# Patient Record
Sex: MALE | Race: BLACK OR AFRICAN AMERICAN | NOT HISPANIC OR LATINO | Employment: FULL TIME | URBAN - METROPOLITAN AREA
[De-identification: names, ages, dates, MRNs, and addresses within clinical notes are randomized per-mention and may not be internally consistent; named-entity substitution may affect disease eponyms.]

---

## 2021-07-14 ENCOUNTER — OFFICE VISIT (OUTPATIENT)
Dept: GASTROENTEROLOGY | Facility: CLINIC | Age: 26
End: 2021-07-14
Payer: COMMERCIAL

## 2021-07-14 ENCOUNTER — APPOINTMENT (OUTPATIENT)
Dept: LAB | Facility: CLINIC | Age: 26
End: 2021-07-14
Payer: COMMERCIAL

## 2021-07-14 VITALS
BODY MASS INDEX: 24.55 KG/M2 | DIASTOLIC BLOOD PRESSURE: 70 MMHG | SYSTOLIC BLOOD PRESSURE: 118 MMHG | HEART RATE: 65 BPM | HEIGHT: 67 IN | WEIGHT: 156.4 LBS

## 2021-07-14 DIAGNOSIS — R10.13 EPIGASTRIC PAIN: ICD-10-CM

## 2021-07-14 DIAGNOSIS — R53.81 DEBILITY: ICD-10-CM

## 2021-07-14 DIAGNOSIS — R19.4 CHANGE IN BOWEL HABITS: Primary | ICD-10-CM

## 2021-07-14 DIAGNOSIS — R63.4 UNINTENTIONAL WEIGHT LOSS: ICD-10-CM

## 2021-07-14 DIAGNOSIS — R11.0 NAUSEA: ICD-10-CM

## 2021-07-14 DIAGNOSIS — K59.00 ACUTE CONSTIPATION: ICD-10-CM

## 2021-07-14 DIAGNOSIS — K92.1 MELENA: ICD-10-CM

## 2021-07-14 LAB
BASOPHILS # BLD AUTO: 0.07 THOUSANDS/ΜL (ref 0–0.1)
BASOPHILS NFR BLD AUTO: 1 % (ref 0–1)
EOSINOPHIL # BLD AUTO: 0.5 THOUSAND/ΜL (ref 0–0.61)
EOSINOPHIL NFR BLD AUTO: 10 % (ref 0–6)
ERYTHROCYTE [DISTWIDTH] IN BLOOD BY AUTOMATED COUNT: 13.3 % (ref 11.6–15.1)
HCT VFR BLD AUTO: 42.2 % (ref 36.5–49.3)
HGB BLD-MCNC: 12.5 G/DL (ref 12–17)
IMM GRANULOCYTES # BLD AUTO: 0.01 THOUSAND/UL (ref 0–0.2)
IMM GRANULOCYTES NFR BLD AUTO: 0 % (ref 0–2)
LYMPHOCYTES # BLD AUTO: 1.6 THOUSANDS/ΜL (ref 0.6–4.47)
LYMPHOCYTES NFR BLD AUTO: 33 % (ref 14–44)
MCH RBC QN AUTO: 24.3 PG (ref 26.8–34.3)
MCHC RBC AUTO-ENTMCNC: 29.6 G/DL (ref 31.4–37.4)
MCV RBC AUTO: 82 FL (ref 82–98)
MONOCYTES # BLD AUTO: 0.56 THOUSAND/ΜL (ref 0.17–1.22)
MONOCYTES NFR BLD AUTO: 12 % (ref 4–12)
NEUTROPHILS # BLD AUTO: 2.12 THOUSANDS/ΜL (ref 1.85–7.62)
NEUTS SEG NFR BLD AUTO: 44 % (ref 43–75)
NRBC BLD AUTO-RTO: 0 /100 WBCS
PLATELET # BLD AUTO: 240 THOUSANDS/UL (ref 149–390)
PMV BLD AUTO: 11.1 FL (ref 8.9–12.7)
RBC # BLD AUTO: 5.14 MILLION/UL (ref 3.88–5.62)
WBC # BLD AUTO: 4.86 THOUSAND/UL (ref 4.31–10.16)

## 2021-07-14 PROCEDURE — 85025 COMPLETE CBC W/AUTO DIFF WBC: CPT

## 2021-07-14 PROCEDURE — 36415 COLL VENOUS BLD VENIPUNCTURE: CPT

## 2021-07-14 PROCEDURE — 99203 OFFICE O/P NEW LOW 30 MIN: CPT | Performed by: PHYSICIAN ASSISTANT

## 2021-07-14 PROCEDURE — 84443 ASSAY THYROID STIM HORMONE: CPT

## 2021-07-14 PROCEDURE — 80053 COMPREHEN METABOLIC PANEL: CPT

## 2021-07-14 PROCEDURE — 82652 VIT D 1 25-DIHYDROXY: CPT

## 2021-07-14 RX ORDER — COVID-19 ANTIGEN TEST
KIT MISCELLANEOUS
COMMUNITY
End: 2021-07-14

## 2021-07-14 RX ORDER — PANTOPRAZOLE SODIUM 40 MG/1
40 TABLET, DELAYED RELEASE ORAL DAILY
Qty: 30 TABLET | Refills: 2 | Status: SHIPPED | OUTPATIENT
Start: 2021-07-14 | End: 2021-08-09

## 2021-07-14 NOTE — H&P (VIEW-ONLY)
[facility] - Outpatient Consultation  [facility], THE 26 y o  male MRN: [id number]  Encounter: [id number]          ASSESSMENT AND PLAN:      1  Melena  2  Epigastric Pain  3  Nausea  - He reports postprandial epigastric pressure x 2 months associated with nausea, regurgitation, and several episodes of black colored stool  - Start protonix 40 mg daily  - Schedule EGD for further evaluation  - He denies NSAID use      4  Change in bowel habits  5  Acute constipation  6  Unintentional weight loss  - He reports a significant change in bowel habits with constipation for up to a week at a time with a weight loss of 10 lbs unintentionally without changes in medication or activity level  - Schedule colonoscopy for further evaluation given his significant change in bowel habits and weight loss  - Start a fiber supplement or miralax daily  - He denies any family history of IBD, colon cancer, or any personal surgical history        ______________________________________________________________________    HPI:  Rodrigo Sinha is a 27 yo M with no significant PMH, presenting for evaluation of new onset constipation over the past 2 months as well as postprandial upper abdominal pressure with nausea at times  He reports that prior to 2 months ago he would have normal formed daily bowel movements without any abdominal pain  He reports that nothing change in his life such as his activity level or medications and now he will not have a bowel movement for about a week at a time  He reports that he runs frequently time and eats a high-fiber diet  He tried taking miralax which didn't help and he took metamucil which then caused diarrhea  He has seen several episodes of black appearing stool, no hematochezia or BRBPR  He denies any abdominal surgeries  He is getting postprandial epigastric pressure with nausea at times and regurgitation  He denies any vomiting    He has lost about 10 lb in the past 2 months unintentionally  He has never had an endoscopy or colonoscopy in the past   He denies NSAID use  He denies any family history of colon cancer or inflammatory bowel disease      REVIEW OF SYSTEMS:    CONSTITUTIONAL: Denies any fever, chills, rigors, and weight loss  HEENT: No earache or tinnitus  Denies hearing loss or visual disturbances  CARDIOVASCULAR: No chest pain or palpitations  RESPIRATORY: Denies any cough, hemoptysis, shortness of breath or dyspnea on exertion  GASTROINTESTINAL: As noted in the History of Present Illness  GENITOURINARY: No problems with urination  Denies any hematuria or dysuria  NEUROLOGIC: No dizziness or vertigo, denies headaches  MUSCULOSKELETAL: Denies any muscle or joint pain  SKIN: Denies skin rashes or itching  ENDOCRINE: Denies excessive thirst  Denies intolerance to heat or cold  PSYCHOSOCIAL: Denies depression or anxiety  Denies any recent memory loss  Historical Information   History reviewed  No pertinent past medical history  History reviewed  No pertinent surgical history  Social History   Social History     Substance and Sexual Activity   Alcohol Use Yes    Comment: ocassstephany     Social History     Substance and Sexual Activity   Drug Use Not on file     Social History     Tobacco Use   Smoking Status Never Smoker   Smokeless Tobacco Never Used     History reviewed  No pertinent family history  Meds/Allergies       Current Outpatient Medications:     pantoprazole (PROTONIX) 40 mg tablet    Allergies   Allergen Reactions    Pollen Extract Sneezing           Objective     Blood pressure 118/70, pulse 65, height 5' 7" (1 702 m), weight 70 9 kg (156 lb 6 4 oz)  Body mass index is 24 5 kg/m²          PHYSICAL EXAM:      General Appearance:   Alert, cooperative, no distress   HEENT:   Normocephalic, atraumatic, anicteric      Neck:  Supple, symmetrical, trachea midline   Lungs:   Clear to auscultation bilaterally; no rales, rhonchi or wheezing; respirations unlabored    Heart[de-identified]   Regular rate and rhythm; no murmur, rub, or gallop  Abdomen:   Soft, non-tender, non-distended; normal bowel sounds; no masses, no organomegaly    Genitalia:   Deferred    Rectal:   Deferred    Extremities:  No cyanosis, clubbing or edema    Pulses:  2+ and symmetric    Skin:  No jaundice, rashes, or lesions    Lymph nodes:  No palpable cervical lymphadenopathy        Lab Results:   No visits with results within 1 Day(s) from this visit  Latest known visit with results is:   No results found for any previous visit  Radiology Results:   No results found

## 2021-07-14 NOTE — PROGRESS NOTES
[facility] - Outpatient Consultation  [facility], THE 26 y o  male MRN: [id number]  Encounter: [id number]          ASSESSMENT AND PLAN:      1  Melena  2  Epigastric Pain  3  Nausea  - He reports postprandial epigastric pressure x 2 months associated with nausea, regurgitation, and several episodes of black colored stool  - Start protonix 40 mg daily  - Schedule EGD for further evaluation  - He denies NSAID use      4  Change in bowel habits  5  Acute constipation  6  Unintentional weight loss  - He reports a significant change in bowel habits with constipation for up to a week at a time with a weight loss of 10 lbs unintentionally without changes in medication or activity level  - Schedule colonoscopy for further evaluation given his significant change in bowel habits and weight loss  - Start a fiber supplement or miralax daily  - He denies any family history of IBD, colon cancer, or any personal surgical history        ______________________________________________________________________    HPI:  Flip Bhardwaj is a 27 yo M with no significant PMH, presenting for evaluation of new onset constipation over the past 2 months as well as postprandial upper abdominal pressure with nausea at times  He reports that prior to 2 months ago he would have normal formed daily bowel movements without any abdominal pain  He reports that nothing change in his life such as his activity level or medications and now he will not have a bowel movement for about a week at a time  He reports that he runs frequently time and eats a high-fiber diet  He tried taking miralax which didn't help and he took metamucil which then caused diarrhea  He has seen several episodes of black appearing stool, no hematochezia or BRBPR  He denies any abdominal surgeries  He is getting postprandial epigastric pressure with nausea at times and regurgitation  He denies any vomiting    He has lost about 10 lb in the past 2 months unintentionally  He has never had an endoscopy or colonoscopy in the past   He denies NSAID use  He denies any family history of colon cancer or inflammatory bowel disease      REVIEW OF SYSTEMS:    CONSTITUTIONAL: Denies any fever, chills, rigors, and weight loss  HEENT: No earache or tinnitus  Denies hearing loss or visual disturbances  CARDIOVASCULAR: No chest pain or palpitations  RESPIRATORY: Denies any cough, hemoptysis, shortness of breath or dyspnea on exertion  GASTROINTESTINAL: As noted in the History of Present Illness  GENITOURINARY: No problems with urination  Denies any hematuria or dysuria  NEUROLOGIC: No dizziness or vertigo, denies headaches  MUSCULOSKELETAL: Denies any muscle or joint pain  SKIN: Denies skin rashes or itching  ENDOCRINE: Denies excessive thirst  Denies intolerance to heat or cold  PSYCHOSOCIAL: Denies depression or anxiety  Denies any recent memory loss  Historical Information   History reviewed  No pertinent past medical history  History reviewed  No pertinent surgical history  Social History   Social History     Substance and Sexual Activity   Alcohol Use Yes    Comment: ocassstephany     Social History     Substance and Sexual Activity   Drug Use Not on file     Social History     Tobacco Use   Smoking Status Never Smoker   Smokeless Tobacco Never Used     History reviewed  No pertinent family history  Meds/Allergies       Current Outpatient Medications:     pantoprazole (PROTONIX) 40 mg tablet    Allergies   Allergen Reactions    Pollen Extract Sneezing           Objective     Blood pressure 118/70, pulse 65, height 5' 7" (1 702 m), weight 70 9 kg (156 lb 6 4 oz)  Body mass index is 24 5 kg/m²          PHYSICAL EXAM:      General Appearance:   Alert, cooperative, no distress   HEENT:   Normocephalic, atraumatic, anicteric      Neck:  Supple, symmetrical, trachea midline   Lungs:   Clear to auscultation bilaterally; no rales, rhonchi or wheezing; respirations unlabored    Heart[de-identified]   Regular rate and rhythm; no murmur, rub, or gallop  Abdomen:   Soft, non-tender, non-distended; normal bowel sounds; no masses, no organomegaly    Genitalia:   Deferred    Rectal:   Deferred    Extremities:  No cyanosis, clubbing or edema    Pulses:  2+ and symmetric    Skin:  No jaundice, rashes, or lesions    Lymph nodes:  No palpable cervical lymphadenopathy        Lab Results:   No visits with results within 1 Day(s) from this visit  Latest known visit with results is:   No results found for any previous visit  Radiology Results:   No results found

## 2021-07-15 ENCOUNTER — TELEPHONE (OUTPATIENT)
Dept: GASTROENTEROLOGY | Facility: HOSPITAL | Age: 26
End: 2021-07-15

## 2021-07-15 LAB
ALBUMIN SERPL BCP-MCNC: 4 G/DL (ref 3.5–5)
ALP SERPL-CCNC: 65 U/L (ref 46–116)
ALT SERPL W P-5'-P-CCNC: 45 U/L (ref 12–78)
ANION GAP SERPL CALCULATED.3IONS-SCNC: 4 MMOL/L (ref 4–13)
AST SERPL W P-5'-P-CCNC: 30 U/L (ref 5–45)
BILIRUB SERPL-MCNC: 0.62 MG/DL (ref 0.2–1)
BUN SERPL-MCNC: 12 MG/DL (ref 5–25)
CALCIUM SERPL-MCNC: 9.5 MG/DL (ref 8.3–10.1)
CHLORIDE SERPL-SCNC: 106 MMOL/L (ref 100–108)
CO2 SERPL-SCNC: 29 MMOL/L (ref 21–32)
CREAT SERPL-MCNC: 1.03 MG/DL (ref 0.6–1.3)
GFR SERPL CREATININE-BSD FRML MDRD: 117 ML/MIN/1.73SQ M
GLUCOSE P FAST SERPL-MCNC: 83 MG/DL (ref 65–99)
POTASSIUM SERPL-SCNC: 4.7 MMOL/L (ref 3.5–5.3)
PROT SERPL-MCNC: 7.6 G/DL (ref 6.4–8.2)
SODIUM SERPL-SCNC: 139 MMOL/L (ref 136–145)
TSH SERPL DL<=0.05 MIU/L-ACNC: 0.42 UIU/ML (ref 0.36–3.74)

## 2021-07-16 ENCOUNTER — HOSPITAL ENCOUNTER (OUTPATIENT)
Dept: GASTROENTEROLOGY | Facility: HOSPITAL | Age: 26
Setting detail: OUTPATIENT SURGERY
Discharge: HOME | End: 2021-07-16
Attending: INTERNAL MEDICINE
Payer: COMMERCIAL

## 2021-07-16 ENCOUNTER — ANESTHESIA EVENT (OUTPATIENT)
Dept: GASTROENTEROLOGY | Facility: HOSPITAL | Age: 26
End: 2021-07-16

## 2021-07-16 ENCOUNTER — ANESTHESIA (OUTPATIENT)
Dept: GASTROENTEROLOGY | Facility: HOSPITAL | Age: 26
End: 2021-07-16

## 2021-07-16 ENCOUNTER — TELEPHONE (OUTPATIENT)
Dept: GASTROENTEROLOGY | Facility: CLINIC | Age: 26
End: 2021-07-16

## 2021-07-16 VITALS
SYSTOLIC BLOOD PRESSURE: 130 MMHG | WEIGHT: 153.66 LBS | OXYGEN SATURATION: 100 % | BODY MASS INDEX: 24.12 KG/M2 | RESPIRATION RATE: 14 BRPM | DIASTOLIC BLOOD PRESSURE: 65 MMHG | TEMPERATURE: 97.4 F | HEART RATE: 62 BPM | HEIGHT: 67 IN

## 2021-07-16 DIAGNOSIS — R11.0 NAUSEA: ICD-10-CM

## 2021-07-16 DIAGNOSIS — R19.4 CHANGE IN BOWEL HABITS: ICD-10-CM

## 2021-07-16 DIAGNOSIS — R63.4 UNINTENTIONAL WEIGHT LOSS: ICD-10-CM

## 2021-07-16 DIAGNOSIS — K59.00 ACUTE CONSTIPATION: ICD-10-CM

## 2021-07-16 DIAGNOSIS — R10.13 EPIGASTRIC PAIN: ICD-10-CM

## 2021-07-16 DIAGNOSIS — K92.1 MELENA: ICD-10-CM

## 2021-07-16 PROCEDURE — 43239 EGD BIOPSY SINGLE/MULTIPLE: CPT | Performed by: INTERNAL MEDICINE

## 2021-07-16 PROCEDURE — 45378 DIAGNOSTIC COLONOSCOPY: CPT | Performed by: INTERNAL MEDICINE

## 2021-07-16 PROCEDURE — 88305 TISSUE EXAM BY PATHOLOGIST: CPT | Performed by: PATHOLOGY

## 2021-07-16 RX ORDER — LIDOCAINE HYDROCHLORIDE 20 MG/ML
INJECTION, SOLUTION EPIDURAL; INFILTRATION; INTRACAUDAL; PERINEURAL AS NEEDED
Status: DISCONTINUED | OUTPATIENT
Start: 2021-07-16 | End: 2021-07-16

## 2021-07-16 RX ORDER — SODIUM CHLORIDE, SODIUM LACTATE, POTASSIUM CHLORIDE, CALCIUM CHLORIDE 600; 310; 30; 20 MG/100ML; MG/100ML; MG/100ML; MG/100ML
125 INJECTION, SOLUTION INTRAVENOUS CONTINUOUS
Status: DISCONTINUED | OUTPATIENT
Start: 2021-07-16 | End: 2021-07-20 | Stop reason: HOSPADM

## 2021-07-16 RX ORDER — PROPOFOL 10 MG/ML
INJECTION, EMULSION INTRAVENOUS AS NEEDED
Status: DISCONTINUED | OUTPATIENT
Start: 2021-07-16 | End: 2021-07-16

## 2021-07-16 RX ADMIN — LIDOCAINE HYDROCHLORIDE 100 MG: 20 INJECTION, SOLUTION EPIDURAL; INFILTRATION; INTRACAUDAL; PERINEURAL at 11:15

## 2021-07-16 RX ADMIN — SODIUM CHLORIDE, SODIUM LACTATE, POTASSIUM CHLORIDE, AND CALCIUM CHLORIDE 125 ML/HR: .6; .31; .03; .02 INJECTION, SOLUTION INTRAVENOUS at 10:59

## 2021-07-16 RX ADMIN — PROPOFOL 50 MG: 10 INJECTION, EMULSION INTRAVENOUS at 11:19

## 2021-07-16 RX ADMIN — PROPOFOL 50 MG: 10 INJECTION, EMULSION INTRAVENOUS at 11:16

## 2021-07-16 RX ADMIN — PROPOFOL 150 MG: 10 INJECTION, EMULSION INTRAVENOUS at 11:15

## 2021-07-16 RX ADMIN — PROPOFOL 50 MG: 10 INJECTION, EMULSION INTRAVENOUS at 11:22

## 2021-07-16 NOTE — ANESTHESIA PREPROCEDURE EVALUATION
Procedure:  EGD  COLONOSCOPY    Relevant Problems   No relevant active problems        Physical Exam    Airway    Mallampati score: II         Dental   No notable dental hx     Cardiovascular  Rhythm: regular, Rate: normal, Cardiovascular exam normal    Pulmonary  Pulmonary exam normal Breath sounds clear to auscultation,     Other Findings        Anesthesia Plan  ASA Score- 1     Anesthesia Type- IV sedation with anesthesia with ASA Monitors  Additional Monitors:   Airway Plan:           Plan Factors-Exercise tolerance (METS): >4 METS  Chart reviewed  Patient is not a current smoker  Patient instructed to abstain from smoking on day of procedure  Patient did not smoke on day of surgery  There is medical exclusion for perioperative obstructive sleep apnea risk education  Induction- intravenous  Postoperative Plan-     Informed Consent- Anesthetic plan and risks discussed with patient  I personally reviewed this patient with the CRNA  Discussed and agreed on the Anesthesia Plan with the CRNA  Courtney Smith

## 2021-07-16 NOTE — DISCHARGE INSTRUCTIONS
Upper Endoscopy and Colonoscopy   WHAT YOU NEED TO KNOW:   An upper endoscopy is also called an upper gastrointestinal (GI) endoscopy, or an esophagogastroduodenoscopy (EGD)  It is a procedure to examine the inside of your esophagus, stomach, and duodenum (first part of the small intestine) with a scope  You may feel bloated, gassy, or have some abdominal discomfort after your procedure  Your throat may be sore for 24 to 36 hours  You may burp or pass gas from air that is still inside your body  A colonoscopy is a procedure to examine the inside of your colon (intestine) with a scope  Polyps or tissue growths may have been removed during your colonoscopy  It is normal to feel bloated and to have some abdominal discomfort  You should be passing gas  If you have hemorrhoids or you had polyps removed, you may have a small amount of bleeding  DISCHARGE INSTRUCTIONS:   Seek care immediately if:   · You have sudden, severe abdominal pain  · You have problems swallowing  · You have a large amount of black, sticky bowel movements or blood in your bowel movements  · You have sudden trouble breathing  · You feel weak, lightheaded, or faint or your heart beats faster than normal for you  Contact your healthcare provider if:   · You have a fever and chills  · You have nausea or are vomiting  · Your abdomen is bloated or feels full and hard  · You have abdominal pain  · You have a large amount of black, sticky bowel movements or blood in your bowel movements  · You have not had a bowel movement for 3 days after your procedure  · You have rash or hives  · You have questions or concerns about your procedure  Activity:   ·       Do not lift, strain, or run for 24 hours after your procedure  ·       Rest after your procedure  You have been given medicine to relax you  Do not drive or make important decisions until the day after your procedure   Return to your normal activity as directed  ·       Relieve gas and discomfort from bloating by lying on your right side with a heating pad on your abdomen  You may need to take short walks to help the gas move out  Eat small meals until bloating is relieved  Follow up with your healthcare provider as directed: Write down your questions so you remember to ask them during your visits  If you take a blood thinner, please review the specific instructions from your endoscopist about when you should resume it  These can be found in the Recommendation and Your Medication list sections of this After Visit Summary  Gastroesophageal Reflux Disease   WHAT YOU NEED TO KNOW:   Gastroesophageal reflux disease (GERD) is reflux that occurs more than twice a week for a few weeks  Reflux means acid and food in the stomach back up into the esophagus  It usually causes heartburn and other symptoms  GERD can cause other health problems over time if it is not treated  DISCHARGE INSTRUCTIONS:   Call your local emergency number (911 in the [address] [facility]) if:   · You have severe chest pain and sudden trouble breathing  Seek care immediately if:   · You have trouble breathing after you vomit  · You have trouble swallowing, or pain with swallowing  · Your bowel movements are black, bloody, or tarry-looking  · Your vomit looks like coffee grounds or has blood in it  Call your doctor or gastroenterologist if:   · You feel full and cannot burp or vomit  · You vomit large amounts, or you vomit often  · You are losing weight without trying  · Your symptoms get worse or do not improve with treatment  · You have questions or concerns about your condition or care  Medicines:   · Medicines  are used to decrease stomach acid  Medicine may also be used to help your lower esophageal sphincter and stomach contract (tighten) more  · Take your medicine as directed    Contact your healthcare provider if you think your medicine is not helping or if you have side effects  Tell him or her if you are allergic to any medicine  Keep a list of the medicines, vitamins, and herbs you take  Include the amounts, and when and why you take them  Bring the list or the pill bottles to follow-up visits  Carry your medicine list with you in case of an emergency  Manage GERD:   · Do not have foods or drinks that may increase heartburn  These include chocolate, peppermint, fried or fatty foods, drinks that contain caffeine, or carbonated drinks (soda)  Other foods include spicy foods, onions, tomatoes, and tomato-based foods  Do not have foods or drinks that can irritate your esophagus, such as citrus fruits, juices, and alcohol  · Do not eat large meals  When you eat a lot of food at one time, your stomach needs more acid to digest it  Eat 6 small meals each day instead of 3 large ones, and eat slowly  Do not eat meals 2 to 3 hours before bedtime  · Elevate the head of your bed  Place 6-inch blocks under the head of your bed frame  You may also use more than one pillow under your head and shoulders while you sleep  · Maintain a healthy weight  If you are overweight, weight loss may help relieve symptoms of GERD  · Do not smoke  Smoking weakens the lower esophageal sphincter and increases the risk of GERD  Ask your healthcare provider for information if you currently smoke and need help to quit  E-cigarettes or smokeless tobacco still contain nicotine  Talk to your healthcare provider before you use these products  · Do not wear clothing that is tight around your waist   Tight clothing can put pressure on your stomach and cause or worsen GERD symptoms  Follow up with your doctor or gastroenterologist as directed:  Write down your questions so you remember to ask them during your visits    © Copyright [address] Information is for End User's use only and may not be sold, redistributed or otherwise used for commercial purposes  All illustrations and images included in CareNotes® are the copyrighted property of [employer]  or [employer]  The above information is an  only  It is not intended as medical advice for individual conditions or treatments  Talk to your doctor, nurse or pharmacist before following any medical regimen to see if it is safe and effective for you

## 2021-07-16 NOTE — ANESTHESIA POSTPROCEDURE EVALUATION
Post-Op Assessment Note    CV Status:  Stable  Pain Score: 0    Pain management: adequate     Mental Status:  Awake   Hydration Status:  Stable   PONV Controlled:  Controlled   Airway Patency:  Patent      Post Op Vitals Reviewed: Yes      Staff: CRNA         No complications documented      BP   99/60   Temp      Pulse 61   Resp   12   SpO2   100

## 2021-07-16 NOTE — INTERVAL H&P NOTE
H&P reviewed  After examining the patient I find no changes in the patients condition since the H&P had been written      Vitals:    07/16/21 1037   BP: 135/82   Pulse: (!) 49   Resp: 13   Temp: 97 6 °F (36 4 °C)   SpO2: 100%

## 2021-07-17 LAB — 1,25(OH)2D3 SERPL-MCNC: 64.3 PG/ML (ref 19.9–79.3)

## 2021-07-20 ENCOUNTER — TELEPHONE (OUTPATIENT)
Dept: GASTROENTEROLOGY | Facility: CLINIC | Age: 26
End: 2021-07-20

## 2021-07-20 NOTE — TELEPHONE ENCOUNTER
----- Message from Sivan Perdomo MD sent at 7/20/2021  9:41 AM EDT -----  Please tell him that all of his biopsies were negative

## 2021-08-07 DIAGNOSIS — R10.13 EPIGASTRIC PAIN: ICD-10-CM

## 2021-08-07 DIAGNOSIS — K92.1 MELENA: ICD-10-CM

## 2021-08-07 DIAGNOSIS — R11.0 NAUSEA: ICD-10-CM

## 2021-08-09 RX ORDER — PANTOPRAZOLE SODIUM 40 MG/1
TABLET, DELAYED RELEASE ORAL
Qty: 30 TABLET | Refills: 2 | Status: SHIPPED | OUTPATIENT
Start: 2021-08-09 | End: 2021-11-09

## 2021-11-09 DIAGNOSIS — R10.13 EPIGASTRIC PAIN: ICD-10-CM

## 2021-11-09 DIAGNOSIS — K92.1 MELENA: ICD-10-CM

## 2021-11-09 DIAGNOSIS — R11.0 NAUSEA: ICD-10-CM

## 2021-11-09 RX ORDER — PANTOPRAZOLE SODIUM 40 MG/1
TABLET, DELAYED RELEASE ORAL
Qty: 90 TABLET | Refills: 1 | Status: SHIPPED | OUTPATIENT
Start: 2021-11-09

## 2023-06-27 PROCEDURE — 99285 EMERGENCY DEPT VISIT HI MDM: CPT

## 2023-06-28 ENCOUNTER — HOSPITAL ENCOUNTER (EMERGENCY)
Facility: HOSPITAL | Age: 28
End: 2023-06-28
Attending: EMERGENCY MEDICINE
Payer: COMMERCIAL

## 2023-06-28 ENCOUNTER — HOSPITAL ENCOUNTER (INPATIENT)
Facility: HOSPITAL | Age: 28
LOS: 9 days | Discharge: HOME | DRG: 750 | End: 2023-07-07
Attending: STUDENT IN AN ORGANIZED HEALTH CARE EDUCATION/TRAINING PROGRAM | Admitting: STUDENT IN AN ORGANIZED HEALTH CARE EDUCATION/TRAINING PROGRAM
Payer: COMMERCIAL

## 2023-06-28 VITALS
OXYGEN SATURATION: 98 % | SYSTOLIC BLOOD PRESSURE: 124 MMHG | DIASTOLIC BLOOD PRESSURE: 74 MMHG | HEIGHT: 67 IN | TEMPERATURE: 98.2 F | RESPIRATION RATE: 18 BRPM | HEART RATE: 77 BPM | BODY MASS INDEX: 24.07 KG/M2

## 2023-06-28 DIAGNOSIS — R44.0 AUDITORY HALLUCINATIONS: Primary | ICD-10-CM

## 2023-06-28 DIAGNOSIS — Z00.8 MEDICAL CLEARANCE FOR PSYCHIATRIC ADMISSION: ICD-10-CM

## 2023-06-28 DIAGNOSIS — F20.9 SCHIZOPHRENIA (HCC): ICD-10-CM

## 2023-06-28 DIAGNOSIS — F20.9 SCHIZOPHRENIA, UNSPECIFIED TYPE (HCC): Primary | ICD-10-CM

## 2023-06-28 LAB
AMPHETAMINES SERPL QL SCN: NEGATIVE
ATRIAL RATE: 75 BPM
BARBITURATES UR QL: NEGATIVE
BENZODIAZ UR QL: NEGATIVE
COCAINE UR QL: NEGATIVE
ETHANOL EXG-MCNC: NORMAL MG/DL
METHADONE UR QL: NEGATIVE
OPIATES UR QL SCN: NEGATIVE
OXYCODONE+OXYMORPHONE UR QL SCN: NEGATIVE
P AXIS: 56 DEGREES
PCP UR QL: NEGATIVE
PR INTERVAL: 144 MS
QRS AXIS: 67 DEGREES
QRSD INTERVAL: 98 MS
QT INTERVAL: 366 MS
QTC INTERVAL: 408 MS
T WAVE AXIS: -26 DEGREES
THC UR QL: NEGATIVE
VENTRICULAR RATE: 75 BPM

## 2023-06-28 PROCEDURE — 93005 ELECTROCARDIOGRAM TRACING: CPT

## 2023-06-28 PROCEDURE — 99285 EMERGENCY DEPT VISIT HI MDM: CPT | Performed by: EMERGENCY MEDICINE

## 2023-06-28 PROCEDURE — 82075 ASSAY OF BREATH ETHANOL: CPT | Performed by: EMERGENCY MEDICINE

## 2023-06-28 PROCEDURE — 93010 ELECTROCARDIOGRAM REPORT: CPT | Performed by: INTERNAL MEDICINE

## 2023-06-28 PROCEDURE — 80307 DRUG TEST PRSMV CHEM ANLYZR: CPT | Performed by: EMERGENCY MEDICINE

## 2023-06-28 RX ORDER — HALOPERIDOL 5 MG/ML
2.5 INJECTION INTRAMUSCULAR
Status: DISCONTINUED | OUTPATIENT
Start: 2023-06-28 | End: 2023-07-07 | Stop reason: HOSPADM

## 2023-06-28 RX ORDER — HYDROXYZINE HYDROCHLORIDE 25 MG/1
25 TABLET, FILM COATED ORAL
Status: CANCELLED | OUTPATIENT
Start: 2023-06-28

## 2023-06-28 RX ORDER — TRAZODONE HYDROCHLORIDE 100 MG/1
200 TABLET ORAL
Status: DISCONTINUED | OUTPATIENT
Start: 2023-06-28 | End: 2023-06-28 | Stop reason: HOSPADM

## 2023-06-28 RX ORDER — LORAZEPAM 2 MG/ML
1 INJECTION INTRAMUSCULAR
Status: DISCONTINUED | OUTPATIENT
Start: 2023-06-28 | End: 2023-07-07 | Stop reason: HOSPADM

## 2023-06-28 RX ORDER — MAGNESIUM HYDROXIDE/ALUMINUM HYDROXICE/SIMETHICONE 120; 1200; 1200 MG/30ML; MG/30ML; MG/30ML
30 SUSPENSION ORAL EVERY 4 HOURS PRN
Status: DISCONTINUED | OUTPATIENT
Start: 2023-06-28 | End: 2023-07-07 | Stop reason: HOSPADM

## 2023-06-28 RX ORDER — HALOPERIDOL 5 MG/1
5 TABLET ORAL
Status: DISCONTINUED | OUTPATIENT
Start: 2023-06-28 | End: 2023-07-07 | Stop reason: HOSPADM

## 2023-06-28 RX ORDER — HYDROXYZINE HYDROCHLORIDE 25 MG/1
50 TABLET, FILM COATED ORAL
Status: CANCELLED | OUTPATIENT
Start: 2023-06-28

## 2023-06-28 RX ORDER — ACETAMINOPHEN 325 MG/1
975 TABLET ORAL EVERY 6 HOURS PRN
Status: CANCELLED | OUTPATIENT
Start: 2023-06-28

## 2023-06-28 RX ORDER — LORAZEPAM 2 MG/ML
2 INJECTION INTRAMUSCULAR
Status: CANCELLED | OUTPATIENT
Start: 2023-06-28

## 2023-06-28 RX ORDER — LORAZEPAM 2 MG/ML
2 INJECTION INTRAMUSCULAR EVERY 6 HOURS PRN
Status: DISCONTINUED | OUTPATIENT
Start: 2023-06-28 | End: 2023-07-07 | Stop reason: HOSPADM

## 2023-06-28 RX ORDER — BENZTROPINE MESYLATE 1 MG/1
2 TABLET ORAL DAILY
Status: DISCONTINUED | OUTPATIENT
Start: 2023-06-29 | End: 2023-06-28 | Stop reason: HOSPADM

## 2023-06-28 RX ORDER — HALOPERIDOL 5 MG/1
5 TABLET ORAL
Status: CANCELLED | OUTPATIENT
Start: 2023-06-28

## 2023-06-28 RX ORDER — AMOXICILLIN 250 MG
1 CAPSULE ORAL DAILY PRN
Status: DISCONTINUED | OUTPATIENT
Start: 2023-06-28 | End: 2023-07-07 | Stop reason: HOSPADM

## 2023-06-28 RX ORDER — AMOXICILLIN 250 MG
1 CAPSULE ORAL DAILY PRN
Status: CANCELLED | OUTPATIENT
Start: 2023-06-28

## 2023-06-28 RX ORDER — BENZTROPINE MESYLATE 1 MG/ML
1 INJECTION INTRAMUSCULAR; INTRAVENOUS 2 TIMES DAILY PRN
Status: DISCONTINUED | OUTPATIENT
Start: 2023-06-28 | End: 2023-07-07 | Stop reason: HOSPADM

## 2023-06-28 RX ORDER — BISACODYL 10 MG
10 SUPPOSITORY, RECTAL RECTAL DAILY PRN
Status: CANCELLED | OUTPATIENT
Start: 2023-06-28

## 2023-06-28 RX ORDER — HALOPERIDOL 2 MG/1
2 TABLET ORAL
Status: DISCONTINUED | OUTPATIENT
Start: 2023-06-28 | End: 2023-07-07 | Stop reason: HOSPADM

## 2023-06-28 RX ORDER — NICOTINE 21 MG/24HR
1 PATCH, TRANSDERMAL 24 HOURS TRANSDERMAL DAILY
Status: DISCONTINUED | OUTPATIENT
Start: 2023-06-29 | End: 2023-07-07 | Stop reason: HOSPADM

## 2023-06-28 RX ORDER — HALOPERIDOL 1 MG/1
2 TABLET ORAL
Status: CANCELLED | OUTPATIENT
Start: 2023-06-28

## 2023-06-28 RX ORDER — BISACODYL 10 MG
10 SUPPOSITORY, RECTAL RECTAL DAILY PRN
Status: DISCONTINUED | OUTPATIENT
Start: 2023-06-28 | End: 2023-07-07 | Stop reason: HOSPADM

## 2023-06-28 RX ORDER — HYDROXYZINE 50 MG/1
50 TABLET, FILM COATED ORAL
Status: DISCONTINUED | OUTPATIENT
Start: 2023-06-28 | End: 2023-07-07 | Stop reason: HOSPADM

## 2023-06-28 RX ORDER — DIPHENHYDRAMINE HYDROCHLORIDE 50 MG/ML
50 INJECTION INTRAMUSCULAR; INTRAVENOUS EVERY 6 HOURS PRN
Status: DISCONTINUED | OUTPATIENT
Start: 2023-06-28 | End: 2023-07-07 | Stop reason: HOSPADM

## 2023-06-28 RX ORDER — LORAZEPAM 2 MG/ML
2 INJECTION INTRAMUSCULAR
Status: DISCONTINUED | OUTPATIENT
Start: 2023-06-28 | End: 2023-07-07 | Stop reason: HOSPADM

## 2023-06-28 RX ORDER — BENZTROPINE MESYLATE 1 MG/ML
1 INJECTION INTRAMUSCULAR; INTRAVENOUS
Status: DISCONTINUED | OUTPATIENT
Start: 2023-06-28 | End: 2023-07-07 | Stop reason: HOSPADM

## 2023-06-28 RX ORDER — OLANZAPINE 5 MG/1
10 TABLET, ORALLY DISINTEGRATING ORAL DAILY
Status: DISCONTINUED | OUTPATIENT
Start: 2023-06-29 | End: 2023-06-28 | Stop reason: HOSPADM

## 2023-06-28 RX ORDER — DIPHENHYDRAMINE HYDROCHLORIDE 50 MG/ML
50 INJECTION INTRAMUSCULAR; INTRAVENOUS EVERY 6 HOURS PRN
Status: CANCELLED | OUTPATIENT
Start: 2023-06-28

## 2023-06-28 RX ORDER — BENZTROPINE MESYLATE 1 MG/ML
1 INJECTION INTRAMUSCULAR; INTRAVENOUS 2 TIMES DAILY PRN
Status: CANCELLED | OUTPATIENT
Start: 2023-06-28

## 2023-06-28 RX ORDER — BENZTROPINE MESYLATE 1 MG/ML
0.5 INJECTION INTRAMUSCULAR; INTRAVENOUS
Status: DISCONTINUED | OUTPATIENT
Start: 2023-06-28 | End: 2023-07-07 | Stop reason: HOSPADM

## 2023-06-28 RX ORDER — TRAZODONE HYDROCHLORIDE 100 MG/1
200 TABLET ORAL
Status: CANCELLED | OUTPATIENT
Start: 2023-06-28

## 2023-06-28 RX ORDER — MINERAL OIL AND PETROLATUM 150; 830 MG/G; MG/G
OINTMENT OPHTHALMIC 4 TIMES DAILY PRN
Status: CANCELLED | OUTPATIENT
Start: 2023-06-28

## 2023-06-28 RX ORDER — BENZTROPINE MESYLATE 1 MG/1
1 TABLET ORAL 2 TIMES DAILY PRN
Status: DISCONTINUED | OUTPATIENT
Start: 2023-06-28 | End: 2023-07-07 | Stop reason: HOSPADM

## 2023-06-28 RX ORDER — HYDROXYZINE HYDROCHLORIDE 25 MG/1
25 TABLET, FILM COATED ORAL
Status: DISCONTINUED | OUTPATIENT
Start: 2023-06-28 | End: 2023-07-07 | Stop reason: HOSPADM

## 2023-06-28 RX ORDER — MINERAL OIL AND PETROLATUM 150; 830 MG/G; MG/G
OINTMENT OPHTHALMIC 4 TIMES DAILY PRN
Status: DISCONTINUED | OUTPATIENT
Start: 2023-06-28 | End: 2023-07-07 | Stop reason: HOSPADM

## 2023-06-28 RX ORDER — HALOPERIDOL 5 MG/ML
5 INJECTION INTRAMUSCULAR
Status: DISCONTINUED | OUTPATIENT
Start: 2023-06-28 | End: 2023-07-07 | Stop reason: HOSPADM

## 2023-06-28 RX ORDER — BENZTROPINE MESYLATE 1 MG/ML
0.5 INJECTION INTRAMUSCULAR; INTRAVENOUS
Status: CANCELLED | OUTPATIENT
Start: 2023-06-28

## 2023-06-28 RX ORDER — OLANZAPINE 5 MG/1
10 TABLET, ORALLY DISINTEGRATING ORAL ONCE
Status: COMPLETED | OUTPATIENT
Start: 2023-06-28 | End: 2023-06-28

## 2023-06-28 RX ORDER — ACETAMINOPHEN 325 MG/1
650 TABLET ORAL EVERY 4 HOURS PRN
Status: CANCELLED | OUTPATIENT
Start: 2023-06-28

## 2023-06-28 RX ORDER — MAGNESIUM HYDROXIDE/ALUMINUM HYDROXICE/SIMETHICONE 120; 1200; 1200 MG/30ML; MG/30ML; MG/30ML
30 SUSPENSION ORAL EVERY 4 HOURS PRN
Status: CANCELLED | OUTPATIENT
Start: 2023-06-28

## 2023-06-28 RX ORDER — TRAZODONE HYDROCHLORIDE 50 MG/1
50 TABLET ORAL
Status: DISCONTINUED | OUTPATIENT
Start: 2023-06-28 | End: 2023-07-07 | Stop reason: HOSPADM

## 2023-06-28 RX ORDER — TRAZODONE HYDROCHLORIDE 50 MG/1
50 TABLET ORAL
Status: CANCELLED | OUTPATIENT
Start: 2023-06-28

## 2023-06-28 RX ORDER — BENZTROPINE MESYLATE 1 MG/1
2 TABLET ORAL ONCE
Status: COMPLETED | OUTPATIENT
Start: 2023-06-28 | End: 2023-06-28

## 2023-06-28 RX ORDER — NICOTINE 21 MG/24HR
1 PATCH, TRANSDERMAL 24 HOURS TRANSDERMAL DAILY
Status: CANCELLED | OUTPATIENT
Start: 2023-06-28

## 2023-06-28 RX ORDER — BENZTROPINE MESYLATE 1 MG/1
1 TABLET ORAL 2 TIMES DAILY PRN
Status: CANCELLED | OUTPATIENT
Start: 2023-06-28

## 2023-06-28 RX ORDER — BENZTROPINE MESYLATE 1 MG/ML
1 INJECTION INTRAMUSCULAR; INTRAVENOUS
Status: CANCELLED | OUTPATIENT
Start: 2023-06-28

## 2023-06-28 RX ORDER — ACETAMINOPHEN 325 MG/1
650 TABLET ORAL EVERY 4 HOURS PRN
Status: DISCONTINUED | OUTPATIENT
Start: 2023-06-28 | End: 2023-07-07 | Stop reason: HOSPADM

## 2023-06-28 RX ORDER — OLANZAPINE 10 MG/1
10 TABLET, ORALLY DISINTEGRATING ORAL DAILY
Status: DISCONTINUED | OUTPATIENT
Start: 2023-06-29 | End: 2023-06-29

## 2023-06-28 RX ORDER — ACETAMINOPHEN 325 MG/1
650 TABLET ORAL EVERY 6 HOURS PRN
Status: DISCONTINUED | OUTPATIENT
Start: 2023-06-28 | End: 2023-07-07 | Stop reason: HOSPADM

## 2023-06-28 RX ORDER — LORAZEPAM 2 MG/ML
2 INJECTION INTRAMUSCULAR EVERY 6 HOURS PRN
Status: CANCELLED | OUTPATIENT
Start: 2023-06-28

## 2023-06-28 RX ORDER — ACETAMINOPHEN 325 MG/1
650 TABLET ORAL EVERY 6 HOURS PRN
Status: CANCELLED | OUTPATIENT
Start: 2023-06-28

## 2023-06-28 RX ORDER — HYDROXYZINE HYDROCHLORIDE 25 MG/1
100 TABLET, FILM COATED ORAL
Status: CANCELLED | OUTPATIENT
Start: 2023-06-28

## 2023-06-28 RX ORDER — TRAZODONE HYDROCHLORIDE 100 MG/1
200 TABLET ORAL
Status: DISCONTINUED | OUTPATIENT
Start: 2023-06-28 | End: 2023-06-29

## 2023-06-28 RX ORDER — ACETAMINOPHEN 325 MG/1
975 TABLET ORAL EVERY 6 HOURS PRN
Status: DISCONTINUED | OUTPATIENT
Start: 2023-06-28 | End: 2023-07-07 | Stop reason: HOSPADM

## 2023-06-28 RX ORDER — HYDROXYZINE 50 MG/1
100 TABLET, FILM COATED ORAL
Status: DISCONTINUED | OUTPATIENT
Start: 2023-06-28 | End: 2023-07-07 | Stop reason: HOSPADM

## 2023-06-28 RX ORDER — HALOPERIDOL 5 MG/ML
5 INJECTION INTRAMUSCULAR
Status: CANCELLED | OUTPATIENT
Start: 2023-06-28

## 2023-06-28 RX ORDER — HALOPERIDOL 5 MG/ML
2.5 INJECTION INTRAMUSCULAR
Status: CANCELLED | OUTPATIENT
Start: 2023-06-28

## 2023-06-28 RX ORDER — LORAZEPAM 2 MG/ML
1 INJECTION INTRAMUSCULAR
Status: CANCELLED | OUTPATIENT
Start: 2023-06-28

## 2023-06-28 RX ORDER — OLANZAPINE 5 MG/1
10 TABLET, ORALLY DISINTEGRATING ORAL DAILY
Status: CANCELLED | OUTPATIENT
Start: 2023-06-29

## 2023-06-28 RX ADMIN — BENZTROPINE MESYLATE 2 MG: 1 TABLET ORAL at 01:32

## 2023-06-28 RX ADMIN — TRAZODONE HYDROCHLORIDE 200 MG: 100 TABLET ORAL at 01:32

## 2023-06-28 RX ADMIN — OLANZAPINE 10 MG: 5 TABLET, ORALLY DISINTEGRATING ORAL at 01:32

## 2023-06-28 RX ADMIN — TRAZODONE HYDROCHLORIDE 200 MG: 100 TABLET ORAL at 21:50

## 2023-06-28 NOTE — ED NOTES
This nurse resuming care of patient at this time  Patient sleeping at this time, resting comfortably on stretcher  Respirations normal, no acute distress noted          Darrel Dubois RN  06/28/23 0489

## 2023-06-28 NOTE — ED PROVIDER NOTES
History  Chief Complaint   Patient presents with   • Hallucinations     Pt states experiencing auditory hallucinations that started this evening  Pt denies command hallucinations  Denies visual disturbances  Denies SI/HI  States previously followed with a psychiatrist but has not been following with them now  60-year-old male patient with history of schizophrenia, on the appropriate medications, unfortunately ran out of his medicines approximately 2 weeks ago  Since running out of his medications the patient was not able to get any sleep  He has been awake for over 1 week at this point  The patient has been hallucinating now for the last several days and came into the emergency department for evaluation  The patient is awake alert and oriented, here willingly, he is exhibiting no signs which would make him a danger to himself or others  The patient is asking for inpatient psychiatric care so as to get back on medications and get leveled out  If at any point in time, however, the patient would ask for discharge and would have absolutely no problem discharging this patient with a refill of his prescription medications  History provided by:  Patient   used: No    Medical Problem  Severity:  Mild  Onset quality:  Gradual  Timing:  Constant  Progression:  Worsening  Chronicity:  New  Associated symptoms: no abdominal pain, no congestion, no ear pain, no headaches, no myalgias and no vomiting        Prior to Admission Medications   Prescriptions Last Dose Informant Patient Reported? Taking? pantoprazole (PROTONIX) 40 mg tablet   No No   Sig: TAKE 1 TABLET BY MOUTH EVERY DAY      Facility-Administered Medications: None       History reviewed  No pertinent past medical history  Past Surgical History:   Procedure Laterality Date   • COLONOSCOPY         History reviewed  No pertinent family history  I have reviewed and agree with the history as documented      E-Cigarette/Vaping   • E-Cigarette Use Current Some Day User      E-Cigarette/Vaping Substances   • Nicotine Yes      Social History     Tobacco Use   • Smoking status: Every Day     Types: Cigarettes   • Smokeless tobacco: Never   Vaping Use   • Vaping Use: Some days   • Substances: Nicotine   Substance Use Topics   • Alcohol use: Yes     Comment: ocassioanlly   • Drug use: Not Currently       Review of Systems   HENT: Negative for congestion and ear pain  Gastrointestinal: Negative for abdominal pain and vomiting  Musculoskeletal: Negative for myalgias  Neurological: Negative for headaches  All other systems reviewed and are negative  Physical Exam  Physical Exam  Vitals and nursing note reviewed  Constitutional:       General: He is not in acute distress  Appearance: He is well-developed  He is not diaphoretic  HENT:      Head: Normocephalic and atraumatic  Right Ear: External ear normal       Left Ear: External ear normal    Eyes:      General: No scleral icterus  Right eye: No discharge  Left eye: No discharge  Conjunctiva/sclera: Conjunctivae normal    Neck:      Thyroid: No thyromegaly  Vascular: No JVD  Trachea: No tracheal deviation  Cardiovascular:      Rate and Rhythm: Normal rate and regular rhythm  Pulmonary:      Effort: Pulmonary effort is normal  No respiratory distress  Breath sounds: Normal breath sounds  No stridor  No wheezing or rales  Abdominal:      General: Bowel sounds are normal  There is no distension  Palpations: Abdomen is soft  Tenderness: There is no abdominal tenderness  Musculoskeletal:         General: No tenderness or deformity  Normal range of motion  Cervical back: Normal range of motion and neck supple  Skin:     General: Skin is warm and dry  Neurological:      Mental Status: He is alert and oriented to person, place, and time  Cranial Nerves: No cranial nerve deficit        Coordination: Coordination normal  Psychiatric:         Behavior: Behavior normal          Vital Signs  ED Triage Vitals [06/28/23 0004]   Temp Pulse Respirations Blood Pressure SpO2   -- 86 18 164/94 100 %      Temp src Heart Rate Source Patient Position - Orthostatic VS BP Location FiO2 (%)   -- Monitor Sitting Left arm --      Pain Score       No Pain           Vitals:    06/28/23 0004   BP: 164/94   Pulse: 86   Patient Position - Orthostatic VS: Sitting         Visual Acuity      ED Medications  Medications   OLANZapine (ZyPREXA ZYDIS) dispersible tablet 10 mg (has no administration in time range)   traZODone (DESYREL) tablet 200 mg (has no administration in time range)   benztropine (COGENTIN) tablet 2 mg (has no administration in time range)       Diagnostic Studies  Results Reviewed     Procedure Component Value Units Date/Time    POCT alcohol breath test [[id number]]     Lab Status: No result     Rapid drug screen, urine [[id number]]     Lab Status: No result Specimen: Urine                  No orders to display              Procedures  Procedures         ED Course                                             MDM    Disposition  Final diagnoses:   None     ED Disposition     None      Follow-up Information    None         Patient's Medications   Discharge Prescriptions    No medications on file       No discharge procedures on file      PDMP Review     None          ED Provider  Electronically Signed by Procedures  Procedures         ED Course                                             MDM    Disposition  Final diagnoses: Auditory hallucinations   Schizophrenia ([address])     Time reflects when diagnosis was documented in both MDM as applicable and the Disposition within this note     Time User Action Codes Description Comment    6/28/2023  6:41 AM Charolotte Handler Add [R44.0] Auditory hallucinations     6/28/2023  6:41 AM Charolotte Handler Add [F20.9] Schizophrenia ([address])     6/28/2023  1:04 PM Elisakelli Umaña Add [Z00.8] Medical clearance for psychiatric admission       ED Disposition     ED Disposition   Transfer to [address] [place] [address]   --    Date/Time   Wed Jun 28, 2023  1:52 PM    Comment   Carlene Arroyo should be transferred out to [facility] and has been medically cleared.              MD Documentation    Flowsheet Row Most Recent Value   Patient Condition The patient has been stabilized such that within reasonable medical probability, no material deterioration of the patient condition or the condition of the unborn child(juan manuel) is likely to result from the transfer   Reason for Transfer Level of Care needed not available at this facility   Benefits of Transfer Other benefits (Include comment)_______________________   Risks of Transfer Potential for delay in receiving treatment   Accepting Physician Dr. Chandra Opitz Name, Joy Chavez 2W    (Name & Tel number) CECILIA Marques   Transported by [facility] and Unit #) [facility]   Sending MD Dr. Milton Merlos   Provider Certification The patient is stable for psychiatric transfer because they are medically stable, and is protected from harming him/herself or others during transport      RN Documentation    [address] Joy Yu 2W    (Name & Tel number) CECILIA Marques   Report Given to Veronika mares RN   Transported by [facility] and Unit #) [facility]      Follow-up Information    None         Discharge Medication List as of 6/28/2023  3:19 PM      CONTINUE these medications which have NOT CHANGED    Details   pantoprazole (PROTONIX) 40 mg tablet TAKE 1 TABLET BY MOUTH EVERY DAY, Normal             No discharge procedures on file.     PDMP Review     None          ED Provider  Electronically Signed by           Sumit Gardner DO  07/06/23 3603

## 2023-06-28 NOTE — ED NOTES
Black shorts  Wallet  Cell phone  White lio shirt  Crocs    Placed in locker #12      Jessica Moss, MIGUEL  06/28/23 [address], RN  06/28/23 [address], RN  06/28/23 1002

## 2023-06-28 NOTE — ED NOTES
"Pt is a 33 y o  male who presented to the ED due to increased depression, anxiety, and auditory hallucinations  Patient reports that he has had numerous inpatient admissions at facilities in [address] [place] [address], last approximately 4 months ago  Patient also reports that he has been linked with outpatient providers in the past, although is not currently seeing a psychiatrist or therapist here in [place]  Patient does report that he recently moved from [address] [place] [address] to [place] approximately 1 month ago, and has not sought treatment as he just recently completed for Medicaid smiley  Patient reports that he ran out of his medications  Patient denies any substance use, but does indicate that he smokes cigarettes on a daily basis but wishes to quit  Patient also denies suicidal thoughts at this time, but reports previous thoughts  Patient, however, does deny prior self injurious behaviors or suicide attempts of any kind  Patient also denies homicidal thoughts as well as visual hallucinations and reports that the auditory hallucinations are \"new\"  Patient does indicate that he has a good support system which includes his aunt who he resides with, as well has his siblings  Patient also reports extremely poor sleep stating that last evening was the first time he slept in approximately 2 weeks  Patient also reports a decreased appetite recently  At this time, the patient is requesting an inpatient level of care as he feels he needs to [facility] care of his mental health and get back on medications\"  Discussed treatment options with the patient who is now willing to sign a 201  Chief Complaint   Patient presents with   • Hallucinations     Pt states experiencing auditory hallucinations that started this evening  Pt denies command hallucinations  Denies visual disturbances  Denies SI/HI  States previously followed with a psychiatrist but has not been following with them now  Intake Assessment completed, Safety Risk Assessment completed      Gretchen Late " Dimitris Shaw, [address]  06/28/23  0785

## 2023-06-28 NOTE — ED NOTES
Room strip completed  Patient meets criteria for low risk on CCRS  Will initiate 15 minute checks        Ebony Dean RN  06/28/23 1000

## 2023-06-28 NOTE — ED NOTES
Patient is accepted at [facility]   Patient is accepted by Dr Adriana Sol per Rishi Jerome in Intake  Transportation is arranged with [facility]  Transportation is scheduled for 3pm with [facility]  Per Tesfaye Musa at [facility], patient does not have current insurance at this time  Nurse report is to be called to [phone] prior to patient transfer      Scott Richmond LMSW  06/28/23  2393

## 2023-06-28 NOTE — NURSING NOTE
Pt is a 210 from [address] [facility] ED. Pt presented to ED with worsening auditory hallucinations after being off of his medications for approximately two weeks. Pt admits to having AH at this time, but they are not bothersome and declined PRN medication. Denies VH. Admits to mild paranoia because "they voices say they are going to kill me", but pt understands that these are only hallucinations. Denies SI/HI. Hopeful to get restarted on medications. Pt oriented to unit, denies further questions or concerns at this time.

## 2023-06-28 NOTE — LETTER
[address] [facility] [address]  [address] [address] [facility] [address]  Dept: [phone]      JMZERO TRANSFER CONSENT    NAME Elizabeth Ramachandran                       1995                              MRN [id number]    I have been informed of my rights regarding examination, treatment, and transfer   by Dr Klaudia Fischer MD    Benefits: Other benefits (Include comment)_______________________    Risks: Potential for delay in receiving treatment    Consent for Transfer:  I acknowledge that my medical condition has been evaluated and explained to me by the emergency department physician or other qualified medical person and/or my attending physician, who has recommended that I be transferred to the service of  Accepting Physician: Dr Jerod Galvan at [address] Name, [facility] & State : [facility] 2W  The above potential benefits of such transfer, the potential risks associated with such transfer, and the probable risks of not being transferred have been explained to me, and I fully understand them  The doctor has explained that, in my case, the benefits of transfer outweigh the risks  I agree to be transferred  I authorize the performance of emergency medical procedures and treatments upon me in both transit and upon arrival at the receiving facility  Additionally, I authorize the release of any and all medical records to the receiving facility and request they be transported with me, if possible  I understand that the safest mode of transportation during a medical emergency is an ambulance and that the Hospital advocates the use of this mode of transport  Risks of traveling to the receiving facility by car, including absence of medical control, life sustaining equipment, such as oxygen, and medical personnel has been explained to me and I fully understand them      (NATALYA CORRECT BOX BELOW)  [ X ]  I consent to the stated transfer and to be transported by ambulance/helicopter  [  ]  I consent to the stated transfer, but refuse transportation by ambulance and accept full responsibility for my transportation by car  I understand the risks of non-ambulance transfers and I exonerate the Hospital and its staff from any deterioration in my condition that results from this refusal     X___________________________________________    DATE  23  TIME________  Signature of patient or legally responsible individual signing on patient behalf           RELATIONSHIP TO PATIENT_________________________                        Provider [address] [place] [address]                    1995                              MRN [id number]    A medical screening exam was performed on the above named patient  Based on the examination:    Condition Necessitating Transfer The primary encounter diagnosis was Auditory hallucinations  Diagnoses of Schizophrenia ([facility] [address] ) and Medical clearance for psychiatric admission were also pertinent to this visit      Patient Condition: The patient has been stabilized such that within reasonable medical probability, no material deterioration of the patient condition or the condition of the unborn child(juan manuel) is likely to result from the transfer    Reason for Transfer: Level of Care needed not available at this facility    Transfer Requirements: [address] available and qualified personnel available for treatment as acknowledged by CECILIA Villafuerte  Agreed to accept transfer and to provide appropriate medical treatment as acknowledged by       Dr Donato Lennon  Appropriate medical records of the examination and treatment of the patient are provided at the time of transfer   [address] __JG_____  Transfer will be performed by qualified personnel from __________________________  and appropriate transfer equipment as required, including the use of necessary and appropriate life support measures  Provider Certification: I have examined the patient and explained the following risks and benefits of being transferred/refusing transfer to the patient/family:  The patient is stable for psychiatric transfer because they are medically stable, and is protected from harming him/herself or others during transport      Based on these reasonable risks and benefits to the patient and/or the unborn child(juan manuel), and based upon the information available at the time of the patient’s examination, I certify that the medical benefits reasonably to be expected from the provision of appropriate medical treatments at another medical facility outweigh the increasing risks, if any, to the individual’s medical condition, and in the case of labor to the unborn child, from effecting the transfer      X____________________________________________ DATE 06/28/23        TIME_______      ORIGINAL - SEND TO MEDICAL RECORDS   COPY - SEND WITH PATIENT DURING TRANSFER

## 2023-06-28 NOTE — ED NOTES
Patients aunt stopped in to see patient  She did not want to wake the patient up, so she left her number for patient to call her     Madelyn Mora [phone]     Jessica Pugh RN  06/28/23 1447

## 2023-06-28 NOTE — ED CARE HANDOFF
Emergency Department Sign Out Note        Sign out and transfer of care from Dr Xochitl Scales See Separate Emergency Department note  The patient, Lalitha Rivera, was evaluated by the previous provider for hallucinations in setting of schizophrenia  Patient is medically clear for inpatient psychiatric treatment  Procedures  MDM        Disposition  Final diagnoses: Auditory hallucinations   Schizophrenia ([facility] )     Time reflects when diagnosis was documented in both MDM as applicable and the Disposition within this note     Time User Action Codes Description Comment    6/28/2023  6:41 AM Britton Scott Add [R44 0] Auditory hallucinations     6/28/2023  6:41 AM Britton Scott Add [F20 9] Schizophrenia ([facility] )     6/28/2023  1:04 PM Jimy De Los Santos Add [Z00 8] Medical clearance for psychiatric admission       ED Disposition     ED Disposition   No Disposition Selected    Condition   --    Date/Time   Wed Jun 28, 2023  6:41 AM    Comment              MD Documentation    Symone Mis Most Recent Value   Sending MD Dr Troy Becerril    None       Patient's Medications   Discharge Prescriptions    No medications on file     No discharge procedures on file         ED Provider  Electronically Signed by     Dion Guaman MD  06/28/23 7939

## 2023-06-28 NOTE — NURSING NOTE
BIN   Death row hat   Yellow lighter   cellphone   Yannick CABALLERO   2 dollars 1'S   p ebt    health benefit card   Visa debit [id number]  Family first  card   Ex [place]  School ID   Picture  bjs card   Suicide prevention number   Sing care card     Bedside   White t shirt   Socks black and colorful   Black basketball shorts   Boxers

## 2023-06-29 PROBLEM — F20.9 SCHIZOPHRENIA (HCC): Status: ACTIVE | Noted: 2023-06-29

## 2023-06-29 PROBLEM — K21.9 GERD (GASTROESOPHAGEAL REFLUX DISEASE): Status: ACTIVE | Noted: 2023-06-29

## 2023-06-29 PROBLEM — Z72.0 TOBACCO ABUSE: Status: ACTIVE | Noted: 2023-06-29

## 2023-06-29 PROBLEM — Z00.8 MEDICAL CLEARANCE FOR PSYCHIATRIC ADMISSION: Status: ACTIVE | Noted: 2023-06-29

## 2023-06-29 LAB
25(OH)D3 SERPL-MCNC: 15.6 NG/ML (ref 30–100)
ALBUMIN SERPL BCP-MCNC: 3.9 G/DL (ref 3.5–5)
ALP SERPL-CCNC: 86 U/L (ref 34–104)
ALT SERPL W P-5'-P-CCNC: 154 U/L (ref 7–52)
ANION GAP SERPL CALCULATED.3IONS-SCNC: 7 MMOL/L
AST SERPL W P-5'-P-CCNC: 45 U/L (ref 13–39)
BASOPHILS # BLD AUTO: 0.06 THOUSANDS/ÂΜL (ref 0–0.1)
BASOPHILS NFR BLD AUTO: 1 % (ref 0–1)
BILIRUB SERPL-MCNC: 0.43 MG/DL (ref 0.2–1)
BUN SERPL-MCNC: 10 MG/DL (ref 5–25)
CALCIUM SERPL-MCNC: 9.2 MG/DL (ref 8.4–10.2)
CHLORIDE SERPL-SCNC: 106 MMOL/L (ref 96–108)
CHOLEST SERPL-MCNC: 125 MG/DL
CO2 SERPL-SCNC: 26 MMOL/L (ref 21–32)
CREAT SERPL-MCNC: 0.99 MG/DL (ref 0.6–1.3)
EOSINOPHIL # BLD AUTO: 0.51 THOUSAND/ÂΜL (ref 0–0.61)
EOSINOPHIL NFR BLD AUTO: 6 % (ref 0–6)
ERYTHROCYTE [DISTWIDTH] IN BLOOD BY AUTOMATED COUNT: 13.3 % (ref 11.6–15.1)
EST. AVERAGE GLUCOSE BLD GHB EST-MCNC: 85 MG/DL
GFR SERPL CREATININE-BSD FRML MDRD: 104 ML/MIN/1.73SQ M
GLUCOSE P FAST SERPL-MCNC: 69 MG/DL (ref 65–99)
GLUCOSE SERPL-MCNC: 69 MG/DL (ref 65–140)
HBA1C MFR BLD: 4.6 %
HCT VFR BLD AUTO: 42.7 % (ref 36.5–49.3)
HDLC SERPL-MCNC: 40 MG/DL
HGB BLD-MCNC: 12.8 G/DL (ref 12–17)
IMM GRANULOCYTES # BLD AUTO: 0.08 THOUSAND/UL (ref 0–0.2)
IMM GRANULOCYTES NFR BLD AUTO: 1 % (ref 0–2)
LDLC SERPL CALC-MCNC: 70 MG/DL (ref 0–100)
LYMPHOCYTES # BLD AUTO: 3.01 THOUSANDS/ÂΜL (ref 0.6–4.47)
LYMPHOCYTES NFR BLD AUTO: 35 % (ref 14–44)
MCH RBC QN AUTO: 24.3 PG (ref 26.8–34.3)
MCHC RBC AUTO-ENTMCNC: 30 G/DL (ref 31.4–37.4)
MCV RBC AUTO: 81 FL (ref 82–98)
MONOCYTES # BLD AUTO: 0.72 THOUSAND/ÂΜL (ref 0.17–1.22)
MONOCYTES NFR BLD AUTO: 8 % (ref 4–12)
NEUTROPHILS # BLD AUTO: 4.29 THOUSANDS/ÂΜL (ref 1.85–7.62)
NEUTS SEG NFR BLD AUTO: 49 % (ref 43–75)
NONHDLC SERPL-MCNC: 85 MG/DL
NRBC BLD AUTO-RTO: 0 /100 WBCS
PLATELET # BLD AUTO: 226 THOUSANDS/UL (ref 149–390)
PMV BLD AUTO: 11.7 FL (ref 8.9–12.7)
POTASSIUM SERPL-SCNC: 4.1 MMOL/L (ref 3.5–5.3)
PROT SERPL-MCNC: 7.1 G/DL (ref 6.4–8.4)
RBC # BLD AUTO: 5.27 MILLION/UL (ref 3.88–5.62)
SODIUM SERPL-SCNC: 139 MMOL/L (ref 135–147)
TREPONEMA PALLIDUM IGG+IGM AB [PRESENCE] IN SERUM OR PLASMA BY IMMUNOASSAY: NORMAL
TRIGL SERPL-MCNC: 73 MG/DL
TSH SERPL DL<=0.05 MIU/L-ACNC: 0.56 UIU/ML (ref 0.45–4.5)
WBC # BLD AUTO: 8.67 THOUSAND/UL (ref 4.31–10.16)

## 2023-06-29 PROCEDURE — 83036 HEMOGLOBIN GLYCOSYLATED A1C: CPT | Performed by: PHYSICIAN ASSISTANT

## 2023-06-29 PROCEDURE — 99223 1ST HOSP IP/OBS HIGH 75: CPT | Performed by: PSYCHIATRY & NEUROLOGY

## 2023-06-29 PROCEDURE — 84443 ASSAY THYROID STIM HORMONE: CPT | Performed by: PHYSICIAN ASSISTANT

## 2023-06-29 PROCEDURE — 99222 1ST HOSP IP/OBS MODERATE 55: CPT

## 2023-06-29 PROCEDURE — 80053 COMPREHEN METABOLIC PANEL: CPT | Performed by: PHYSICIAN ASSISTANT

## 2023-06-29 PROCEDURE — 85025 COMPLETE CBC W/AUTO DIFF WBC: CPT | Performed by: PHYSICIAN ASSISTANT

## 2023-06-29 PROCEDURE — 86780 TREPONEMA PALLIDUM: CPT | Performed by: PHYSICIAN ASSISTANT

## 2023-06-29 PROCEDURE — 80061 LIPID PANEL: CPT | Performed by: PHYSICIAN ASSISTANT

## 2023-06-29 PROCEDURE — 82306 VITAMIN D 25 HYDROXY: CPT | Performed by: PHYSICIAN ASSISTANT

## 2023-06-29 RX ORDER — HALOPERIDOL 5 MG/1
5 TABLET ORAL 2 TIMES DAILY
Status: DISCONTINUED | OUTPATIENT
Start: 2023-06-29 | End: 2023-06-30

## 2023-06-29 RX ORDER — TRAZODONE HYDROCHLORIDE 100 MG/1
100 TABLET ORAL
Status: DISCONTINUED | OUTPATIENT
Start: 2023-06-29 | End: 2023-07-03

## 2023-06-29 RX ORDER — ESCITALOPRAM OXALATE 5 MG/1
5 TABLET ORAL DAILY
Status: DISCONTINUED | OUTPATIENT
Start: 2023-06-29 | End: 2023-07-01

## 2023-06-29 RX ADMIN — OLANZAPINE 10 MG: 10 TABLET, ORALLY DISINTEGRATING ORAL at 08:43

## 2023-06-29 RX ADMIN — ESCITALOPRAM 5 MG: 5 TABLET, FILM COATED ORAL at 11:30

## 2023-06-29 RX ADMIN — HALOPERIDOL 5 MG: 5 TABLET ORAL at 21:54

## 2023-06-29 RX ADMIN — NICOTINE POLACRILEX 4 MG: 4 GUM, CHEWING BUCCAL at 08:45

## 2023-06-29 RX ADMIN — TRAZODONE HYDROCHLORIDE 100 MG: 100 TABLET ORAL at 21:54

## 2023-06-29 NOTE — CMS CERTIFICATION NOTE
Recertification: Based upon physical, mental and social evaluations, I certify that inpatient psychiatric services continue to be medically necessary for this patient for a duration of 7 midnights for the treatment of  Schizophrenia [facility])   Available alternative community resources still do not meet the patient's mental health care needs. I further attest that an established written individualized plan of care has been updated and is outlined in the patient's medical records.

## 2023-06-29 NOTE — ASSESSMENT & PLAN NOTE
· Reports not taking any medication for acid reflux  · Patient does not want medication currently for acid reflux as he reports he has not had acid reflux in a few months  · We will continue with Mylanta as needed

## 2023-06-29 NOTE — PLAN OF CARE
Problem: DEPRESSION  Goal: Will be euthymic at discharge  Description: INTERVENTIONS:  - Administer medication as ordered  - Provide emotional support via 1:1 interaction with staff  - Encourage involvement in milieu/groups/activities  - Monitor for social isolation  Outcome: Not Progressing     Problem: PSYCHOSIS  Goal: Will report no hallucinations or delusions  Description: Interventions:  - Administer medication as  ordered  - Every waking shifts and PRN assess for the presence of hallucinations and or delusions  - Assist with reality testing to support increasing orientation  - Assess if patient's hallucinations or delusions are encouraging self-harm or harm to others and intervene as appropriate  Outcome: Not Progressing

## 2023-06-29 NOTE — NURSING NOTE
Patient withdrawn to room, napping in bed this evening. Has a depressed affect and endorses depression. Denies SI/HI. Denies visual hallucinations but reports having voices tonight, "they say they're going to kill me." Patient reports this causes some fear and anxiety. Encouraged OOB, encouraged socializing and coping skills, patient declines at this time. Denies need for PRN medication and says he just wants to rest. Educated on Haldol 5mg and Trazodone 100mg starting tonight, verbalized understanding. Staff availability reinforced.

## 2023-06-29 NOTE — ASSESSMENT & PLAN NOTE
Patient reports not having a significant medical history outside of psychiatric health.   He used to take a medication for his acid reflux but no longer takes any medication and no longer has acid reflux    CBC within normal limits  TSH pending  Syphilis pending  A1c pending  Lipid panel pending  UDS and EtOH negative Statement Selected

## 2023-06-29 NOTE — ED NOTES
Insurance Authorization for admission:   Phone call placed to [email or web address] number: [phone] to [employer] Level of care: inpatient mental health  Authorization # pending auth # [id number]    Clinicals faxed to:   Fax number: [phone]  Attn: [facility]

## 2023-06-29 NOTE — NURSING NOTE
Pt remains calm and cooperative in conversation. Denies SI/HI, AVH. Denies anxiety however does admit to elevated depression which pt states has been ongoing for months. Reviewed scheduled medications as well as healthy coping skills pt can begin to work on to help with symptoms. Pt states he wasn't sleeping well overnight at home however did sleep well last night. Pt denies any questions at this time.

## 2023-06-29 NOTE — TREATMENT PLAN
TREATMENT PLAN REVIEW [facility] 33 y.o. 1995 male MRN: [id number]    [place] Joy Room / Bed: [facility] [address] Encounter: [id number]          Admit Date/Time:  6/28/2023  4:25 PM    Treatment Team: Attending Provider: Moriah Reyes DO; Charge Nurse: Mina Retana RN; Care Manager: Genesis Alanis RN; Licensed Practical Nurse: Leonora Ramirez LPN; Security: Gomezcarolinedelano Jad; Patient Care Technician: Michael Leggett; Patient Care Assistant: Nori Abbasi;  Charge Nurse: Aisha Perdomo RN; Registered Nurse: Jose Hernández RN; Registered Nurse: Brent Suazo RN    Diagnosis: Principal Problem:    Schizophrenia [facility])  Active Problems:    GERD (gastroesophageal reflux disease)    Medical clearance for psychiatric admission    Tobacco abuse      Patient Strengths/Assets: ability for insight    Patient Barriers/Limitations: no outpatient psychiatric care    Short Term Goals: decrease in depressive symptoms, decrease in psychotic symptoms    Long Term Goals: improvement in depression, resolution of psychotic symptoms    Progress Towards Goals: starting psychiatric medications as prescribed, continue psychiatric medications as prescribed, improving gradually    Recommended Treatment: medication management, patient medication education, group therapy, milieu therapy, continued Behavioral Health psychiatric evaluation/assessment process    Treatment Frequency: daily medication monitoring, group and milieu therapy daily, monitoring through interdisciplinary rounds, monitoring through weekly patient care conferences    Expected Discharge Date:  7 days    Discharge Plan: discharge to home with outpatient follow up    Treatment Plan Created/Updated By: Moriah Reyes DO

## 2023-06-29 NOTE — CONSULTS
[address] [facility]  Consult  Name: Davey Felton 33 y.o. male I MRN: [id number]  Unit/Bed#: [address]  204-01 I Date of Admission: 6/28/2023   Date of Service: 6/29/2023 I Hospital Day: 1    Inpatient consult for Medical Clearance for [facility] patient  Consult performed by: Siobhan Waters PA-C  Consult ordered by: Simona Ravi PA-C          Assessment/Plan   * Medical clearance for psychiatric admission  Assessment & Plan  Patient reports not having a significant medical history outside of psychiatric health. He used to take a medication for his acid reflux but no longer takes any medication and no longer has acid reflux    CBC within normal limits  TSH pending  Syphilis pending  A1c pending  Lipid panel pending  UDS and EtOH negative    GERD (gastroesophageal reflux disease)  Assessment & Plan  · Reports not taking any medication for acid reflux  · Patient does not want medication currently for acid reflux as he reports he has not had acid reflux in a few months  · We will continue with Mylanta as needed           VTE Prophylaxis:   Low Risk (Score 0-2) - Encourage Ambulation. Recommendations for Discharge:  · Continue to follow-up with PCP for GERD maintenance    Total Time Spent on Date of Encounter in care of patient: 25 minutes This time was spent on one or more of the following: performing physical exam; counseling and coordination of care; obtaining or reviewing history; documenting in the medical record; reviewing/ordering tests, medications or procedures; communicating with other healthcare professionals and discussing with patient's family/caregivers. Collaboration of Care: Were Recommendations Directly Discussed with Primary Treatment Team? Yes    History of Present Illness:  Davey Felton is a 33 y.o. male who is originally admitted to the behavioral health service due to increased anxiety, depression, and auditory hallucinations.  We are consulted for medical clearance for psychiatric hospitalization. The patient has a long history of psychiatric need including numerous hospitalizations in [facility]. He has not been taking his psychiatric medications for a few months. The patient additionally reports his only long-term medical history outside of psychiatric health is acid reflux. He currently does not have acid reflux and has not been taking medication for his acid reflux. Review of Systems:  Review of Systems   Constitutional: Negative for chills, fatigue and fever. HENT: Negative for rhinorrhea and sinus pressure. Eyes: Negative for photophobia and visual disturbance. Respiratory: Negative for cough, chest tightness, shortness of breath and wheezing. Cardiovascular: Negative for chest pain, palpitations and leg swelling. Gastrointestinal: Negative for abdominal distention, diarrhea, nausea and vomiting. Musculoskeletal: Negative for arthralgias and myalgias. Skin: Negative for pallor and rash. Neurological: Negative for dizziness, weakness, light-headedness and headaches. Past Medical and Surgical History:   History reviewed. No pertinent past medical history. Past Surgical History:   Procedure Laterality Date   • COLONOSCOPY         Meds/Allergies:  all medications and allergies reviewed    Allergies: Allergies   Allergen Reactions   • Pollen Extract Sneezing       Social History:  Marital Status: Single  Substance Use History:   Social History     Substance and Sexual Activity   Alcohol Use Yes    Comment: [email or web address]     Social History     Tobacco Use   Smoking Status Every Day   • Types: Cigarettes   Smokeless Tobacco Never     Social History     Substance and Sexual Activity   Drug Use Not Currently       Family History:  History reviewed. No pertinent family history.     Physical Exam:   Vitals:   Blood Pressure: 119/80 (06/29/23 0740)  Pulse: 63 (06/29/23 0740)  Temperature: 98.2 °F (36.8 °C) (06/29/23 0740)  Temp Source: Tympanic (06/29/23 0740)  Respirations: 16 (06/29/23 0740)  Height: 5' 7" (170.2 cm) (06/28/23 1627)  Weight - Scale: 93.5 kg (206 lb 3.1 oz) (06/28/23 1627)  SpO2: 99 % (06/29/23 0740)    Physical Exam  Vitals and nursing note reviewed. Constitutional:       Appearance: He is normal weight. HENT:      Head: Normocephalic. Nose: Nose normal.      Mouth/Throat:      Mouth: Mucous membranes are moist.      Pharynx: Oropharynx is clear. Eyes:      General: No scleral icterus. Conjunctiva/sclera: Conjunctivae normal.      Pupils: Pupils are equal, round, and reactive to light. Cardiovascular:      Rate and Rhythm: Normal rate and regular rhythm. Heart sounds: No murmur heard. No friction rub. No gallop. Pulmonary:      Effort: Pulmonary effort is normal. No respiratory distress. Breath sounds: Normal breath sounds. No stridor. No wheezing, rhonchi or rales. Abdominal:      General: Abdomen is flat. Palpations: Abdomen is soft. Musculoskeletal:         General: Normal range of motion. Cervical back: Normal range of motion and neck supple. Right lower leg: No edema. Left lower leg: No edema. Lymphadenopathy:      Cervical: No cervical adenopathy. Skin:     General: Skin is warm. Coloration: Skin is not jaundiced or pale. Findings: No bruising, erythema or lesion. Neurological:      General: No focal deficit present. Mental Status: He is alert and oriented to person, place, and time. Mental status is at baseline. Cranial Nerves: No cranial nerve deficit. Motor: No weakness. Psychiatric:         Mood and Affect: Mood normal.         Behavior: Behavior normal.         Thought Content:  Thought content normal.          Additional Data:   Lab Results:    Results from last 7 days   Lab Units 06/29/23  0530   WBC Thousand/uL 8.67   HEMOGLOBIN g/dL 12.8   HEMATOCRIT % 42.7   PLATELETS Thousands/uL 226   NEUTROS PCT % 49   LYMPHS PCT % 35   MONOS PCT % 8   EOS PCT % 6                 No results found for: "HGBA1C"            Imaging: No pertinent imaging reviewed. No orders to display       EKG, Pathology, and Other Studies Reviewed on Admission:   · EKG: NSR. HR 77.    ** Please Note: This note may have been constructed using a voice recognition system.  **

## 2023-06-29 NOTE — H&P
Psychiatric Evaluation - [facility] 33 y.o. male MRN: [id number]  Unit/Bed#: U 204-01 Encounter: [id number]    Assessment/Plan   Principal Problem:    Schizophrenia ([address])  Active Problems:    GERD (gastroesophageal reflux disease)    Medical clearance for psychiatric admission    Tobacco abuse      Plan:   Start Haldol 5mg PO BID  Start Lexapro 5mg PO QD  DC Zyprexa; decrease Trazodone to 100mg PO HS for sleep  Medical management per SLIM  Admit to [facility] psychiatry. Check admission labs: CMP, CBC, TSH, RPR, UDS, Lipid Panel, A1c. Collaborate with family/social work for baseline assessment and disposition planning.   Chart reviewed and case discussed with treatment team.  Start/continue the following medications:  Current Facility-Administered Medications   Medication Dose Route Frequency Provider Last Rate   • acetaminophen  650 mg Oral Q6H PRN Wyvonna OldJOSÉ MIGUEL     • acetaminophen  650 mg Oral Q4H PRN Wyvonna OldJOSÉ MIGUEL     • acetaminophen  975 mg Oral Q6H PRN Wyvonna JOSÉ MIGUEL Pinon     • aluminum-magnesium hydroxide-simethicone  30 mL Oral Q4H PRN Wyvonna OldJOSÉ MIGUEL     • artificial tear   Both Eyes 4x Daily PRN Jesusa JOSÉ MIGUEL Pinon     • haloperidol lactate  2.5 mg Intramuscular Q6H PRN Max 4/day Wyvonna JOSÉ MIGUEL Pinon      And   • LORazepam  1 mg Intramuscular Q6H PRN Max 4/day Wyvonna JOSÉ MIGUEL Pinon      And   • benztropine  0.5 mg Intramuscular Q6H PRN Max 4/day Wyvonna JOSÉ MIGUEL Pinon     • benztropine  1 mg Intramuscular BID PRN Wyvonna JOSÉ MIGUEL Pinon     • haloperidol lactate  5 mg Intramuscular Q4H PRN Max 4/day Wyvonna JOSÉ MIGUEL Pinon      And   • LORazepam  2 mg Intramuscular Q4H PRN Max 4/day Wyvonna JOSÉ MIGUEL Pinon      And   • benztropine  1 mg Intramuscular Q4H PRN Max 4/day Wyvonna JOSÉ MIGUEL Pinon     • benztropine  1 mg Oral BID PRN Wyvonna JOSÉ MIGUEL Pinon     • bisacodyl  10 mg Rectal Daily PRN Wyvonna JOSÉ MIGUEL Pinon     • hydrOXYzine HCL 50 mg Oral Q6H PRN Max 4/day VERONICA SanC      Or   • diphenhydrAMINE  50 mg Intramuscular Q6H PRN VERONICA SanC     • escitalopram  5 mg Oral Daily Genet Necessary, DO     • haloperidol  2 mg Oral Q4H PRN Max 6/day Alexandra Axel, PA-C     • haloperidol  5 mg Oral Q6H PRN Max 4/day Alexandra Axel, PA-C     • haloperidol  5 mg Oral Q4H PRN Max 4/day Alexandra Axel, PA-C     • haloperidol  5 mg Oral BID Genet Necessary, DO     • hydrOXYzine HCL  100 mg Oral Q6H PRN Max 4/day Alexandra VERONICA ReynaC      Or   • LORazepam  2 mg Intramuscular Q6H PRN Alexandra Axel, PA-C     • hydrOXYzine HCL  25 mg Oral Q6H PRN Max 4/day Alexandra Axel, PA-C     • magnesium hydroxide  30 mL Oral Daily PRN RENETTA San-C     • nicotine  1 patch Transdermal Daily VERONICA SanC     • nicotine polacrilex  4 mg Oral Q2H PRN Alexandra RENETTA Reyna-C     • senna-docusate sodium  1 tablet Oral Daily PRN RENETTA San-C     • traZODone  100 mg Oral HS Genet Necessary, DO     • traZODone  50 mg Oral HS PRN Alexandra VERONICA ReynaC         Treatment options and alternatives were reviewed with the patient, who concurs with the above plan. Risks, benefits, and possible side effects of medications were explained to the patient, and he verbalizes understanding.      -----------------------------------    Chief Complaint: "the voices tell me bad things sometimes"    History of Present Illness     Per Crisis worker note:  Pt is a 33 y.o. male who presented to the ED due to increased depression, anxiety, and auditory hallucinations. Patient reports that he has had numerous inpatient admissions at facilities in [place], last approximately 4 months ago. Patient also reports that he has been linked with outpatient providers in the past, although is not currently seeing a psychiatrist or therapist here in [place].   Patient does report that he recently moved from [place] to [place] approximately 1 month ago, and has not sought treatment as he just recently completed for Medicaid smiley. Patient reports that he ran out of his medications. Patient denies any substance use, but does indicate that he smokes cigarettes on a daily basis but wishes to quit. Patient also denies suicidal thoughts at this time, but reports previous thoughts. Patient, however, does deny prior self injurious behaviors or suicide attempts of any kind. Patient also denies homicidal thoughts as well as visual hallucinations and reports that the auditory hallucinations are "new". Patient does indicate that he has a good support system which includes his aunt who he resides with, as well has his siblings. Patient also reports extremely poor sleep stating that last evening was the first time he slept in approximately 2 weeks. Patient also reports a decreased appetite recently. At this time, the patient is requesting an inpatient level of care as he feels he needs to "take care of his mental health and get back on medications". Discussed treatment options with the patient who is now willing to sign a 201. On admission to Inpatient Psychiatric Unit:  Patient is a 43-year-old black male with a past psychiatric history of schizophrenia and tobacco abuse presenting voluntarily to inpatient U with progressively worsening command auditory hallucinations over the past 2 to 4 weeks. Patient states that he ran out of psych medications approximately 2 weeks ago, around which time the patient moved from [place] to [place], and therefore did not have any outpatient psychiatric follow-up or means of refilling his medications. The patient therefore has had worsening psychotic symptoms including command auditory hallucinations and paranoia and depression related to these auditory hallucinations over the past 2 to 4 weeks. There are are no other exacerbating stressors other than running out of his medication.   Mitigating factors include support from his aunt, who the patient lives with. Timeline is 2 to 4 weeks progressively worsening. Symptom severity is rated as severe. Patient denies visual hallucinations, SI, or HI. He does admit to feeling a little depressed about his psychotic symptoms and includes symptoms of low mood, hopelessness, helplessness, decreased energy, decreased motivation. He states that in the past he was well controlled on Haldol, Lexapro. Psychiatric Review Of Systems:  Medication side effects: none  Sleep: no change  Appetite: no change  Hygiene: able to tend to instrumental and basic ADLs  Anxiety Symptoms: denies  Psychotic Symptoms: Per HPI  Depression Symptoms: Per HPI  Manic Symptoms: denies  PTSD Symptoms: denies  Suicidal Thoughts: denies  Homicidal Thoughts: denies    Medical Review Of Systems:   Patient denies headache or dizziness. Patient denies chest pain or palpitations. Patient denies difficulty breathing or wheezing. Patient denies nausea, vomiting, or diarrhea. Patient denies polyuria or polydipsia. Patient denies weakness or numbness. Pertinent positives as per HPI. Historical Information     Psychiatric History:   Diagnoses: Schizophrenia  Inpatient Hx: Patient has had 5 or 6 inpatient hospitalizations all within the past 12 months  Outpatient Hx: Previously had outpatient services somewhere in [facility] but does not have any currently  Medications/Trials: Previously on Haldol, Lexapro, and Seroquel specifically for sleep. Substance Abuse History:  Social History     Substance and Sexual Activity   Alcohol Use Yes    Comment: bernabe     Social History     Substance and Sexual Activity   Drug Use Not Currently     Tobacco: 4 cigs/day    I spent time with Alberto in counseling and education on risk of substance abuse. I assessed motivation and encouraged him for treatment as appropriate. Family History:   History reviewed. No pertinent family history.     Social History:  Highest education: High school  Currently living: With aunt  Relationships: Legally single not in a relationship  Children: None  Occupation: Unemployed, on disability  No legal history  No  history    Rest of social history as per below:    Social History     Socioeconomic History   • Marital status: Single     Spouse name: Not on file   • Number of children: Not on file   • Years of education: Not on file   • Highest education level: Not on file   Occupational History   • Not on file   Tobacco Use   • Smoking status: Every Day     Types: Cigarettes   • Smokeless tobacco: Never   Vaping Use   • Vaping Use: Some days   • Substances: Nicotine   Substance and Sexual Activity   • Alcohol use: Yes     Comment: [email or web address]   • Drug use: Not Currently   • Sexual activity: Not on file   Other Topics Concern   • Not on file   Social History Narrative   • Not on file     Social Determinants of Health     Financial Resource Strain: Not on file   Food Insecurity: Not on file   Transportation Needs: Not on file   Physical Activity: Not on file   Stress: Not on file   Social Connections: Not on file   Intimate Partner Violence: Not on file   Housing Stability: Not on file       Past Medical History:   History reviewed. No pertinent past medical history. -----------------------------------  Objective    Temp:  [97.4 °F (36.3 °C)-98.4 °F (36.9 °C)] 98.2 °F (36.8 °C)  HR:  [] 63  Resp:  [16-18] 16  BP: (115-135)/(79-88) 119/80    Mental Status Evaluation:  Appearance: disheveled, appears consistent with stated age  Motor: +PMR, no gait abnormalities  Behavior: superficially cooperative, requires some verbal redirection  Speech: scant  Mood: "depressed lately"  Affect: bizarre, psychotic-appearing  Thought Process: disorganized, circumstantial at times  Thought Content: + command auditory hallucinations, denies visual hallucinations, denies delusions  Risk Potential: denies suicidal ideation, plan, or intent.  Denies homicidal ideation  Sensorium: Oriented to person, place, time, and situation  Cognition: cognitive ability appears intact but was not quantitatively tested  Consciousness: alert and awake  Attention: currently impaired  Insight: limited  Judgement: limited      Meds/Allergies   Allergies   Allergen Reactions   • Pollen Extract Sneezing         Behavioral Health Medications: all current active meds have been reviewed as per above. Changes as per above. Risks, benefits, indications, and alternatives to treatment were discussed with patient. Laboratory results:  I have personally reviewed all pertinent laboratory/tests results.   Recent Results (from the past 48 hour(s))   ECG 12 lead    Collection Time: 06/28/23  1:26 AM   Result Value Ref Range    Ventricular Rate 75 BPM    Atrial Rate 75 BPM    NM Interval 144 ms    QRSD Interval 98 ms    QT Interval 366 ms    QTC Interval 408 ms    P Axis 56 degrees    QRS Axis 67 degrees    T Wave Axis -26 degrees   POCT alcohol breath test    Collection Time: 06/28/23  1:36 AM   Result Value Ref Range    EXTBreath Alcohol 0.00%    Rapid drug screen, urine    Collection Time: 06/28/23  1:41 AM   Result Value Ref Range    Amph/Meth UR Negative Negative    Barbiturate Ur Negative Negative    Benzodiazepine Urine Negative Negative    Cocaine Urine Negative Negative    Methadone Urine Negative Negative    Opiate Urine Negative Negative    PCP Ur Negative Negative    THC Urine Negative Negative    Oxycodone Urine Negative Negative   Comprehensive metabolic panel    Collection Time: 06/29/23  5:29 AM   Result Value Ref Range    Sodium 139 135 - 147 mmol/L    Potassium 4.1 3.5 - 5.3 mmol/L    Chloride 106 96 - 108 mmol/L    CO2 26 21 - 32 mmol/L    ANION GAP 7 mmol/L    BUN 10 5 - 25 mg/dL    Creatinine 0.99 0.60 - 1.30 mg/dL    Glucose 69 65 - 140 mg/dL    Glucose, Fasting 69 65 - 99 mg/dL    Calcium 9.2 8.4 - 10.2 mg/dL    AST 45 (H) 13 - 39 U/L     (H) 7 - 52 U/L    Alkaline Phosphatase 86 34 - 104 U/L    Total Protein 7.1 6.4 - 8.4 g/dL    Albumin 3.9 3.5 - 5.0 g/dL    Total Bilirubin 0.43 0.20 - 1.00 mg/dL    eGFR 104 ml/min/1.73sq m   Lipid panel    Collection Time: 06/29/23  5:29 AM   Result Value Ref Range    Cholesterol 125 See Comment mg/dL    Triglycerides 73 See Comment mg/dL    HDL, Direct 40 >=40 mg/dL    LDL Calculated 70 0 - 100 mg/dL    Non-HDL-Chol (CHOL-HDL) 85 mg/dl   TSH, 3rd generation with Free T4 reflex    Collection Time: 06/29/23  5:29 AM   Result Value Ref Range    TSH 3RD GENERATON 0.560 0.450 - 4.500 uIU/mL   CBC and differential    Collection Time: 06/29/23  5:30 AM   Result Value Ref Range    WBC 8.67 4.31 - 10.16 Thousand/uL    RBC 5.27 3.88 - 5.62 Million/uL    Hemoglobin 12.8 12.0 - 17.0 g/dL    Hematocrit 42.7 36.5 - 49.3 %    MCV 81 (L) 82 - 98 fL    MCH 24.3 (L) 26.8 - 34.3 pg    MCHC 30.0 (L) 31.4 - 37.4 g/dL    RDW 13.3 11.6 - 15.1 %    MPV 11.7 8.9 - 12.7 fL    Platelets 255 240 - 728 Thousands/uL    nRBC 0 /100 WBCs    Neutrophils Relative 49 43 - 75 %    Immat GRANS % 1 0 - 2 %    Lymphocytes Relative 35 14 - 44 %    Monocytes Relative 8 4 - 12 %    Eosinophils Relative 6 0 - 6 %    Basophils Relative 1 0 - 1 %    Neutrophils Absolute 4.29 1.85 - 7.62 Thousands/µL    Immature Grans Absolute 0.08 0.00 - 0.20 Thousand/uL    Lymphocytes Absolute 3.01 0.60 - 4.47 Thousands/µL    Monocytes Absolute 0.72 0.17 - 1.22 Thousand/µL    Eosinophils Absolute 0.51 0.00 - 0.61 Thousand/µL    Basophils Absolute 0.06 0.00 - 0.10 Thousands/µL        Progress Toward Goals & Illness Status: Patient is not at goal. They are psychiatrically unstable and are not yet ready for discharge. The patient's condition currently requires active psychopharmacological medication management, interdisciplinary coordination with case management, and the utilization of adjunctive milieu and group therapy to augment psychopharmacological efficacy.  The patient's risk of morbidity, and progression or decompensation of psychiatric disease, is high without this current treatment.           -----------------------------------    Risks / Benefits of Treatment:     Risks, benefits, and possible side effects of medications explained to patient. The patient verbalizes understanding and agreement for treatment. Counseling / Coordination of Care:     Patient's presentation on admission and proposed treatment plan were discussed with the treatment team.  Diagnosis, medication changes and treatment plan were reviewed with the patient. Recent stressors were discussed with the patient. Events leading to admission were reviewed with the patient. Importance of medication and treatment compliance was reviewed with the patient. Inpatient Psychiatric Certification:     Certification: Based upon physical, mental and social evaluations, I certify that inpatient psychiatric services are medically necessary for this patient for a duration of 5-7 midnights (exact duration TBD pending patient's response to psychiatric treatment) for the diagnosis listed above. Available alternative community resources do not meet the patient's mental health care needs. I further attest that an established written individualized plan of care has been implemented and is outlined in the patient's medical records. This note has been constructed using a voice recognition system. There may be translation, syntax, or grammatical errors. If you have any questions, please contact the dictating provider.

## 2023-06-30 PROCEDURE — 99233 SBSQ HOSP IP/OBS HIGH 50: CPT | Performed by: PSYCHIATRY & NEUROLOGY

## 2023-06-30 RX ORDER — HALOPERIDOL 5 MG/1
7.5 TABLET ORAL 2 TIMES DAILY
Status: DISCONTINUED | OUTPATIENT
Start: 2023-06-30 | End: 2023-07-02

## 2023-06-30 RX ADMIN — HALOPERIDOL 7.5 MG: 5 TABLET ORAL at 17:10

## 2023-06-30 RX ADMIN — ESCITALOPRAM 5 MG: 5 TABLET, FILM COATED ORAL at 08:46

## 2023-06-30 RX ADMIN — TRAZODONE HYDROCHLORIDE 100 MG: 100 TABLET ORAL at 21:12

## 2023-06-30 RX ADMIN — HALOPERIDOL 5 MG: 5 TABLET ORAL at 08:46

## 2023-06-30 NOTE — NURSING NOTE
Pt seclusive to room listening to voices, declined meds to help decrease voices, declined at this time to get oob to watch TV

## 2023-06-30 NOTE — PROGRESS NOTES
Progress Note - [address] 33 y.o. male MRN: [id number]  Unit/Bed#: U 204-01 Encounter: [id number]    Assessment:  Principal Problem:    Schizophrenia ([address])  Active Problems:    GERD (gastroesophageal reflux disease)    Medical clearance for psychiatric admission    Tobacco abuse      Plan:  --Increase Haldol to 7.5mg PO BID  --Continue with psychiatric hospitalization  --Continue with individual, group, and milieu therapy  --Continue the following medications:  Current Facility-Administered Medications   Medication Dose Route Frequency   • acetaminophen (TYLENOL) tablet 650 mg  650 mg Oral Q6H PRN   • acetaminophen (TYLENOL) tablet 650 mg  650 mg Oral Q4H PRN   • acetaminophen (TYLENOL) tablet 975 mg  975 mg Oral Q6H PRN   • aluminum-magnesium hydroxide-simethicone (MYLANTA) oral suspension 30 mL  30 mL Oral Q4H PRN   • artificial tear (LUBRIFRESH P.M.) ophthalmic ointment   Both Eyes 4x Daily PRN   • haloperidol lactate (HALDOL) injection 2.5 mg  2.5 mg Intramuscular Q6H PRN Max 4/day    And   • LORazepam (ATIVAN) injection 1 mg  1 mg Intramuscular Q6H PRN Max 4/day    And   • benztropine (COGENTIN) injection 0.5 mg  0.5 mg Intramuscular Q6H PRN Max 4/day   • benztropine (COGENTIN) injection 1 mg  1 mg Intramuscular BID PRN   • haloperidol lactate (HALDOL) injection 5 mg  5 mg Intramuscular Q4H PRN Max 4/day    And   • LORazepam (ATIVAN) injection 2 mg  2 mg Intramuscular Q4H PRN Max 4/day    And   • benztropine (COGENTIN) injection 1 mg  1 mg Intramuscular Q4H PRN Max 4/day   • benztropine (COGENTIN) tablet 1 mg  1 mg Oral BID PRN   • bisacodyl (DULCOLAX) rectal suppository 10 mg  10 mg Rectal Daily PRN   • hydrOXYzine HCL (ATARAX) tablet 50 mg  50 mg Oral Q6H PRN Max 4/day    Or   • diphenhydrAMINE (BENADRYL) injection 50 mg  50 mg Intramuscular Q6H PRN   • escitalopram (LEXAPRO) tablet 5 mg  5 mg Oral Daily   • haloperidol (HALDOL) tablet 2 mg  2 mg Oral Q4H PRN Max 6/day   • haloperidol (HALDOL) tablet 5 mg  5 mg Oral Q6H PRN Max 4/day   • haloperidol (HALDOL) tablet 5 mg  5 mg Oral Q4H PRN Max 4/day   • haloperidol (HALDOL) tablet 7.5 mg  7.5 mg Oral BID   • hydrOXYzine HCL (ATARAX) tablet 100 mg  100 mg Oral Q6H PRN Max 4/day    Or   • LORazepam (ATIVAN) injection 2 mg  2 mg Intramuscular Q6H PRN   • hydrOXYzine HCL (ATARAX) tablet 25 mg  25 mg Oral Q6H PRN Max 4/day   • magnesium hydroxide (MILK OF MAGNESIA) oral suspension 30 mL  30 mL Oral Daily PRN   • nicotine (NICODERM CQ) 21 mg/24 hr TD 24 hr patch 1 patch  1 patch Transdermal Daily   • nicotine polacrilex (NICORETTE) gum 4 mg  4 mg Oral Q2H PRN   • senna-docusate sodium (SENOKOT S) 8.6-50 mg per tablet 1 tablet  1 tablet Oral Daily PRN   • traZODone (DESYREL) tablet 100 mg  100 mg Oral HS   • traZODone (DESYREL) tablet 50 mg  50 mg Oral HS PRN       Subjective: Patient was seen for continuation of care. Chart was reviewed and discussed with treatment team.     No acute behavioral events over the past 24 hours. Today, patient was seen and examined at bedside for continuation of care. Patient reports ongoing auditory hallucinations. Continues to be seclusive to his room, scant in conversation, and engaging minimally in the milieu. Discussed plan to continue titration of Haldol and Lexapro. Patient denied adverse effects to their psychiatric medication regimen. Patient denied other new or worsening psychiatric symptoms/complaints at this time. Discussed the importance of continuing to take medications as prescribed, as well as the importance of continuing to attend groups on the unit.      Psychiatric Review of Systems:  Medication adverse effects: none  Sleep: unchanged  Appetite: unchanged  Behavior over the past 24 hours: as per above    Vitals:  Vitals:    06/30/23 0727   BP: 134/74   Pulse: 69   Resp: 16   Temp: (!) 97.2 °F (36.2 °C)   SpO2:        Laboratory results:    I have personally reviewed all pertinent laboratory/tests results  Recent Results (from the past 48 hour(s))   RPR-Syphilis Screening (Total Syphilis IGG/IGM)    Collection Time: 06/29/23  5:28 AM   Result Value Ref Range    Syphilis Total Antibody Non-reactive Non-Reactive   Vitamin D 25 hydroxy    Collection Time: 06/29/23  5:28 AM   Result Value Ref Range    Vit D, 25-Hydroxy 15.6 (L) 30.0 - 100.0 ng/mL   Comprehensive metabolic panel    Collection Time: 06/29/23  5:29 AM   Result Value Ref Range    Sodium 139 135 - 147 mmol/L    Potassium 4.1 3.5 - 5.3 mmol/L    Chloride 106 96 - 108 mmol/L    CO2 26 21 - 32 mmol/L    ANION GAP 7 mmol/L    BUN 10 5 - 25 mg/dL    Creatinine 0.99 0.60 - 1.30 mg/dL    Glucose 69 65 - 140 mg/dL    Glucose, Fasting 69 65 - 99 mg/dL    Calcium 9.2 8.4 - 10.2 mg/dL    AST 45 (H) 13 - 39 U/L     (H) 7 - 52 U/L    Alkaline Phosphatase 86 34 - 104 U/L    Total Protein 7.1 6.4 - 8.4 g/dL    Albumin 3.9 3.5 - 5.0 g/dL    Total Bilirubin 0.43 0.20 - 1.00 mg/dL    eGFR 104 ml/min/1.73sq m   Lipid panel    Collection Time: 06/29/23  5:29 AM   Result Value Ref Range    Cholesterol 125 See Comment mg/dL    Triglycerides 73 See Comment mg/dL    HDL, Direct 40 >=40 mg/dL    LDL Calculated 70 0 - 100 mg/dL    Non-HDL-Chol (CHOL-HDL) 85 mg/dl   TSH, 3rd generation with Free T4 reflex    Collection Time: 06/29/23  5:29 AM   Result Value Ref Range    TSH 3RD GENERATON 0.560 0.450 - 4.500 uIU/mL   CBC and differential    Collection Time: 06/29/23  5:30 AM   Result Value Ref Range    WBC 8.67 4.31 - 10.16 Thousand/uL    RBC 5.27 3.88 - 5.62 Million/uL    Hemoglobin 12.8 12.0 - 17.0 g/dL    Hematocrit 42.7 36.5 - 49.3 %    MCV 81 (L) 82 - 98 fL    MCH 24.3 (L) 26.8 - 34.3 pg    MCHC 30.0 (L) 31.4 - 37.4 g/dL    RDW 13.3 11.6 - 15.1 %    MPV 11.7 8.9 - 12.7 fL    Platelets 656 182 - 256 Thousands/uL    nRBC 0 /100 WBCs    Neutrophils Relative 49 43 - 75 %    Immat GRANS % 1 0 - 2 %    Lymphocytes Relative 35 14 - 44 %    Monocytes Relative 8 4 - 12 %    Eosinophils Relative 6 0 - 6 %    Basophils Relative 1 0 - 1 %    Neutrophils Absolute 4.29 1.85 - 7.62 Thousands/µL    Immature Grans Absolute 0.08 0.00 - 0.20 Thousand/uL    Lymphocytes Absolute 3.01 0.60 - 4.47 Thousands/µL    Monocytes Absolute 0.72 0.17 - 1.22 Thousand/µL    Eosinophils Absolute 0.51 0.00 - 0.61 Thousand/µL    Basophils Absolute 0.06 0.00 - 0.10 Thousands/µL   Hemoglobin A1C    Collection Time: 06/29/23  5:30 AM   Result Value Ref Range    Hemoglobin A1C 4.6 Normal 3.8-5.6%; PreDiabetic 5.7-6.4%; Diabetic >=6.5%; Glycemic control for adults with diabetes <7.0% %    EAG 85 mg/dl        Current Medications:  Current medications as per above. All medications have been reviewed. Risks, benefits, alternatives, and possible side effects of patient's psychiatric medications were discussed with patient. Mental Status Evaluation:  Appearance: disheveled, appears consistent with stated age  Motor: +PMR, no gait abnormalities  Behavior: superficially cooperative, requires some verbal redirection  Speech: scant  Mood: "okay"  Affect: bizarre, psychotic-appearing  Thought Process: disorganized, circumstantial at times  Thought Content: + command auditory hallucinations, denies visual hallucinations, denies delusions  Risk Potential: denies suicidal ideation, plan, or intent. Denies homicidal ideation  Sensorium: Oriented to person, place, time, and situation  Cognition: cognitive ability appears intact but was not quantitatively tested  Consciousness: alert and awake  Attention: currently impaired  Insight: limited  Judgement: limited    Progress Toward Goals & Illness Status: Patient is not at goal. They are not yet ready for discharge.  The patient's condition currently requires active psychopharmacological medication management, interdisciplinary coordination with case management, and the utilization of adjunctive milieu and group therapy to augment psychopharmacological efficacy. The patient's risk of morbidity, and progression or decompensation of psychiatric disease, is higher without this current treatment. This note has been constructed using a voice recognition system. There may be translation, syntax, or grammatical errors. If you have any questions, please contact the dictating provider.

## 2023-07-01 PROCEDURE — 99232 SBSQ HOSP IP/OBS MODERATE 35: CPT | Performed by: PSYCHIATRY & NEUROLOGY

## 2023-07-01 RX ORDER — ESCITALOPRAM OXALATE 10 MG/1
10 TABLET ORAL DAILY
Status: DISCONTINUED | OUTPATIENT
Start: 2023-07-02 | End: 2023-07-07 | Stop reason: HOSPADM

## 2023-07-01 RX ADMIN — HALOPERIDOL 7.5 MG: 5 TABLET ORAL at 17:14

## 2023-07-01 RX ADMIN — ESCITALOPRAM 5 MG: 5 TABLET, FILM COATED ORAL at 08:45

## 2023-07-01 RX ADMIN — HALOPERIDOL 7.5 MG: 5 TABLET ORAL at 08:45

## 2023-07-01 RX ADMIN — TRAZODONE HYDROCHLORIDE 100 MG: 100 TABLET ORAL at 21:18

## 2023-07-01 NOTE — TREATMENT TEAM
07/01/23 0800   Team Meeting   Meeting Type Daily Rounds   Team Members Present   Team Members Present Physician;Nurse   Physician Team Member [facility]   Nursing Team Member Hari   Patient/Family Present   Patient Present No   Patient's Family Present No     AM rounds- withdrawn to room, denies SI/HI. Reports having AH stating "they are going to kill me" pt utilizing radio. Med compliant. Slept well.

## 2023-07-01 NOTE — NURSING NOTE
Pt remains mostly withdrawn to room, denies SI/HI, reports having AH which are negative and stating that people are trying to harm him. Pt states he is trying to ignore them as he knows they are hallucinations and not in danger however at times has difficulty ignoring. Pt declined prn medication, provided pt with radio and encouraged utilizing this as this was helpful yesterday. Pt agreeable. Reports sleeping well overnight. Denies any questions at this time.

## 2023-07-01 NOTE — PROGRESS NOTES
Progress Note - [facility] 33 y.o. male MRN: [id number]  Unit/Bed#: [facility] 204-01 Encounter: [id number]    Assessment:  Principal Problem:    Schizophrenia ([address])  Active Problems:    GERD (gastroesophageal reflux disease)    Medical clearance for psychiatric admission    Tobacco abuse      Plan:  --Increase Lexapro to 10mg PO QD. Likely increase Haldol tomorrow.   --Continue with psychiatric hospitalization  --Continue with individual, group, and milieu therapy  --Continue the following medications:  Current Facility-Administered Medications   Medication Dose Route Frequency   • acetaminophen (TYLENOL) tablet 650 mg  650 mg Oral Q6H PRN   • acetaminophen (TYLENOL) tablet 650 mg  650 mg Oral Q4H PRN   • acetaminophen (TYLENOL) tablet 975 mg  975 mg Oral Q6H PRN   • aluminum-magnesium hydroxide-simethicone (MYLANTA) oral suspension 30 mL  30 mL Oral Q4H PRN   • artificial tear (LUBRIFRESH P.M.) ophthalmic ointment   Both Eyes 4x Daily PRN   • haloperidol lactate (HALDOL) injection 2.5 mg  2.5 mg Intramuscular Q6H PRN Max 4/day    And   • LORazepam (ATIVAN) injection 1 mg  1 mg Intramuscular Q6H PRN Max 4/day    And   • benztropine (COGENTIN) injection 0.5 mg  0.5 mg Intramuscular Q6H PRN Max 4/day   • benztropine (COGENTIN) injection 1 mg  1 mg Intramuscular BID PRN   • haloperidol lactate (HALDOL) injection 5 mg  5 mg Intramuscular Q4H PRN Max 4/day    And   • LORazepam (ATIVAN) injection 2 mg  2 mg Intramuscular Q4H PRN Max 4/day    And   • benztropine (COGENTIN) injection 1 mg  1 mg Intramuscular Q4H PRN Max 4/day   • benztropine (COGENTIN) tablet 1 mg  1 mg Oral BID PRN   • bisacodyl (DULCOLAX) rectal suppository 10 mg  10 mg Rectal Daily PRN   • hydrOXYzine HCL (ATARAX) tablet 50 mg  50 mg Oral Q6H PRN Max 4/day    Or   • diphenhydrAMINE (BENADRYL) injection 50 mg  50 mg Intramuscular Q6H PRN   • [START ON 7/2/2023] escitalopram (LEXAPRO) tablet 10 mg  10 mg Oral Daily   • haloperidol (HALDOL) tablet 2 mg  2 mg Oral Q4H PRN Max 6/day   • haloperidol (HALDOL) tablet 5 mg  5 mg Oral Q6H PRN Max 4/day   • haloperidol (HALDOL) tablet 5 mg  5 mg Oral Q4H PRN Max 4/day   • haloperidol (HALDOL) tablet 7.5 mg  7.5 mg Oral BID   • hydrOXYzine HCL (ATARAX) tablet 100 mg  100 mg Oral Q6H PRN Max 4/day    Or   • LORazepam (ATIVAN) injection 2 mg  2 mg Intramuscular Q6H PRN   • hydrOXYzine HCL (ATARAX) tablet 25 mg  25 mg Oral Q6H PRN Max 4/day   • magnesium hydroxide (MILK OF MAGNESIA) oral suspension 30 mL  30 mL Oral Daily PRN   • nicotine (NICODERM CQ) 21 mg/24 hr TD 24 hr patch 1 patch  1 patch Transdermal Daily   • nicotine polacrilex (NICORETTE) gum 4 mg  4 mg Oral Q2H PRN   • senna-docusate sodium (SENOKOT S) 8.6-50 mg per tablet 1 tablet  1 tablet Oral Daily PRN   • traZODone (DESYREL) tablet 100 mg  100 mg Oral HS   • traZODone (DESYREL) tablet 50 mg  50 mg Oral HS PRN       Subjective: Patient was seen for continuation of care. Chart was reviewed and discussed with treatment team.     No acute behavioral events over the past 24 hours. Today, patient was seen and examined at bedside for continuation of care. Patient reports continued AH with no improvement yet. Discussed plan to continue Haldol titration and likely raise dose tomorrow, and raise Lexapro dose today. Patient is understanding. Patient denied adverse effects to their psychiatric medication regimen. Patient denied other new or worsening psychiatric symptoms/complaints at this time. Discussed the importance of continuing to take medications as prescribed, as well as the importance of continuing to attend groups on the unit.      Psychiatric Review of Systems:  Medication adverse effects: none  Sleep: unchanged  Appetite: unchanged  Behavior over the past 24 hours: as per above    Vitals:  Vitals:    07/01/23 0750   BP: 118/64   Pulse: 66   Resp: 16   Temp: 99.2 °F (37.3 °C)   SpO2:        Laboratory results:    I have personally reviewed all pertinent laboratory/tests results  No results found for this or any previous visit (from the past 48 hour(s)). Current Medications:  Current medications as per above. All medications have been reviewed. Risks, benefits, alternatives, and possible side effects of patient's psychiatric medications were discussed with patient. Mental Status Evaluation:  Appearance: disheveled, appears consistent with stated age  Motor: no psychomotor disturbances, no gait abnormalities  Behavior: superficially cooperative, requires some verbal redirection  Speech: normal rate and rhythm  Mood: "the same I think"  Affect: bizarre, psychotic-appearing  Thought Process: disorganized, circumstantial at times  Thought Content: +auditory hallucinations, denies visual hallucinations, denies delusions  Risk Potential: denies suicidal ideation, plan, or intent. Denies homicidal ideation  Sensorium: Oriented to person, place, time, and situation  Cognition: cognitive ability appears intact but was not quantitatively tested  Consciousness: alert and awake  Attention: currently impaired  Insight: limited  Judgement: limited      Progress Toward Goals & Illness Status: Patient is not at goal. They are not yet ready for discharge. The patient's condition currently requires active psychopharmacological medication management, interdisciplinary coordination with case management, and the utilization of adjunctive milieu and group therapy to augment psychopharmacological efficacy. The patient's risk of morbidity, and progression or decompensation of psychiatric disease, is higher without this current treatment. This note has been constructed using a voice recognition system. There may be translation, syntax, or grammatical errors. If you have any questions, please contact the dictating provider.

## 2023-07-01 NOTE — NURSING NOTE
Pt reports that he continues to hear AH that people are trying to harm him and these AH are constant and difficult to ignore. He reports low mood, denies any issues with sleep or appetite. Denies SI/HI. Blunted affect, mostly seclusive to his room. Utilizing the radio as a coping skill. Cooperative.

## 2023-07-01 NOTE — PLAN OF CARE

## 2023-07-02 PROCEDURE — 99232 SBSQ HOSP IP/OBS MODERATE 35: CPT | Performed by: PSYCHIATRY & NEUROLOGY

## 2023-07-02 RX ORDER — HALOPERIDOL 10 MG/1
10 TABLET ORAL 2 TIMES DAILY
Status: DISCONTINUED | OUTPATIENT
Start: 2023-07-02 | End: 2023-07-07 | Stop reason: HOSPADM

## 2023-07-02 RX ADMIN — ESCITALOPRAM OXALATE 10 MG: 10 TABLET ORAL at 08:50

## 2023-07-02 RX ADMIN — HALOPERIDOL 10 MG: 5 TABLET ORAL at 08:50

## 2023-07-02 RX ADMIN — HALOPERIDOL 10 MG: 5 TABLET ORAL at 18:33

## 2023-07-02 RX ADMIN — TRAZODONE HYDROCHLORIDE 100 MG: 100 TABLET ORAL at 21:21

## 2023-07-02 NOTE — PROGRESS NOTES
Progress Note - [facility] 33 y.o. male MRN: [id number]  Unit/Bed#: U 204-01 Encounter: [id number]    Assessment:  Principal Problem:    Schizophrenia ([address])  Active Problems:    GERD (gastroesophageal reflux disease)    Medical clearance for psychiatric admission    Tobacco abuse      Plan:  --Increase Haldol to 10mg PO BID starting today. If patients psychotic symptoms do not improve in the next 1-2 days, will switch to a different antipsychotic medication.    --Continue with psychiatric hospitalization  --Continue with individual, group, and milieu therapy  --Continue the following medications:  Current Facility-Administered Medications   Medication Dose Route Frequency   • acetaminophen (TYLENOL) tablet 650 mg  650 mg Oral Q6H PRN   • acetaminophen (TYLENOL) tablet 650 mg  650 mg Oral Q4H PRN   • acetaminophen (TYLENOL) tablet 975 mg  975 mg Oral Q6H PRN   • aluminum-magnesium hydroxide-simethicone (MYLANTA) oral suspension 30 mL  30 mL Oral Q4H PRN   • artificial tear (LUBRIFRESH P.M.) ophthalmic ointment   Both Eyes 4x Daily PRN   • haloperidol lactate (HALDOL) injection 2.5 mg  2.5 mg Intramuscular Q6H PRN Max 4/day    And   • LORazepam (ATIVAN) injection 1 mg  1 mg Intramuscular Q6H PRN Max 4/day    And   • benztropine (COGENTIN) injection 0.5 mg  0.5 mg Intramuscular Q6H PRN Max 4/day   • benztropine (COGENTIN) injection 1 mg  1 mg Intramuscular BID PRN   • haloperidol lactate (HALDOL) injection 5 mg  5 mg Intramuscular Q4H PRN Max 4/day    And   • LORazepam (ATIVAN) injection 2 mg  2 mg Intramuscular Q4H PRN Max 4/day    And   • benztropine (COGENTIN) injection 1 mg  1 mg Intramuscular Q4H PRN Max 4/day   • benztropine (COGENTIN) tablet 1 mg  1 mg Oral BID PRN   • bisacodyl (DULCOLAX) rectal suppository 10 mg  10 mg Rectal Daily PRN   • hydrOXYzine HCL (ATARAX) tablet 50 mg  50 mg Oral Q6H PRN Max 4/day    Or   • diphenhydrAMINE (BENADRYL) injection 50 mg  50 mg Intramuscular Q6H PRN   • escitalopram (LEXAPRO) tablet 10 mg  10 mg Oral Daily   • haloperidol (HALDOL) tablet 10 mg  10 mg Oral BID   • haloperidol (HALDOL) tablet 2 mg  2 mg Oral Q4H PRN Max 6/day   • haloperidol (HALDOL) tablet 5 mg  5 mg Oral Q6H PRN Max 4/day   • haloperidol (HALDOL) tablet 5 mg  5 mg Oral Q4H PRN Max 4/day   • hydrOXYzine HCL (ATARAX) tablet 100 mg  100 mg Oral Q6H PRN Max 4/day    Or   • LORazepam (ATIVAN) injection 2 mg  2 mg Intramuscular Q6H PRN   • hydrOXYzine HCL (ATARAX) tablet 25 mg  25 mg Oral Q6H PRN Max 4/day   • magnesium hydroxide (MILK OF MAGNESIA) oral suspension 30 mL  30 mL Oral Daily PRN   • nicotine (NICODERM CQ) 21 mg/24 hr TD 24 hr patch 1 patch  1 patch Transdermal Daily   • nicotine polacrilex (NICORETTE) gum 4 mg  4 mg Oral Q2H PRN   • senna-docusate sodium (SENOKOT S) 8.6-50 mg per tablet 1 tablet  1 tablet Oral Daily PRN   • traZODone (DESYREL) tablet 100 mg  100 mg Oral HS   • traZODone (DESYREL) tablet 50 mg  50 mg Oral HS PRN       Subjective: Patient was seen for continuation of care. Chart was reviewed and discussed with treatment team.     No acute behavioral events over the past 24 hours. Today, patient was seen and examined at bedside for continuation of care. Patient reports ongoing and unchanged AH. Discussed increasing Haldol to 10mg PO BID today, patient agreeable. He has a newly described paranoid delusion where he thinks somebody is going to kill him. Patient denied adverse effects to their psychiatric medication regimen. Patient denied other new or worsening psychiatric symptoms/complaints at this time. Discussed the importance of continuing to take medications as prescribed, as well as the importance of continuing to attend groups on the unit.      Psychiatric Review of Systems:  Medication adverse effects: none  Sleep: unchanged  Appetite: unchanged  Behavior over the past 24 hours: as per above    Vitals:  Vitals:    07/02/23 0723   BP: 136/81   Pulse: 73 Resp: 17   Temp: 98.1 °F (36.7 °C)   SpO2: 99%       Laboratory results:    I have personally reviewed all pertinent laboratory/tests results  No results found for this or any previous visit (from the past 48 hour(s)). Current Medications:  Current medications as per above. All medications have been reviewed. Risks, benefits, alternatives, and possible side effects of patient's psychiatric medications were discussed with patient. Mental Status Evaluation:  Appearance: disheveled, appears consistent with stated age  Motor: no psychomotor disturbances, no gait abnormalities  Behavior: superficially cooperative, requires some verbal redirection  Speech: normal rate and rhythm  Mood: "the same"  Affect: bizarre, psychotic-appearing  Thought Process: disorganized, circumstantial at times  Thought Content: +auditory hallucinations, denies visual hallucinations, +paranoid delusions that somebody is going to kill him  Risk Potential: denies suicidal ideation, plan, or intent. Denies homicidal ideation  Sensorium: Oriented to person, place, time, and situation  Cognition: cognitive ability appears intact but was not quantitatively tested  Consciousness: alert and awake  Attention: currently impaired  Insight: limited  Judgement: limited      Progress Toward Goals & Illness Status: Patient is not at goal. They are not yet ready for discharge. The patient's condition currently requires active psychopharmacological medication management, interdisciplinary coordination with case management, and the utilization of adjunctive milieu and group therapy to augment psychopharmacological efficacy. The patient's risk of morbidity, and progression or decompensation of psychiatric disease, is higher without this current treatment. This note has been constructed using a voice recognition system. There may be translation, syntax, or grammatical errors. If you have any questions, please contact the dictating provider.

## 2023-07-02 NOTE — TREATMENT TEAM
07/02/23 0800   Team Meeting   Meeting Type Daily Rounds   Team Members Present   Team Members Present Physician;Nurse   Physician Team Member [facility]   Nursing Team Member Hari   Patient/Family Present   Patient Present No   Patient's Family Present No       AM rounds- denies SI/HI, AH of people wanting to harm pt. Pt denies VH. Utilizing radio to distract. Withdrawn. Polite. Slept well.

## 2023-07-02 NOTE — PLAN OF CARE
Problem: DEPRESSION  Goal: Will be euthymic at discharge  Description: INTERVENTIONS:  - Administer medication as ordered  - Provide emotional support via 1:1 interaction with staff  - Encourage involvement in milieu/groups/activities  - Monitor for social isolation  Outcome: Progressing     Problem: PSYCHOSIS  Goal: Will report no hallucinations or delusions  Description: Interventions:  - Administer medication as  ordered  - Every waking shifts and PRN assess for the presence of hallucinations and or delusions  - Assist with reality testing to support increasing orientation  - Assess if patient's hallucinations or delusions are encouraging self-harm or harm to others and intervene as appropriate  Outcome: Progressing     Problem: ANXIETY  Goal: Will report anxiety at manageable levels  Description: INTERVENTIONS:  - Administer medication as ordered  - Teach and encourage coping skills  - Provide emotional support  - Assess patient/family for anxiety and ability to cope  Outcome: Progressing  Goal: By discharge: Patient will verbalize 2 strategies to deal with anxiety  Description: Interventions:  - Identify any obvious source/trigger to anxiety  - Staff will assist patient in applying identified coping technique/skills  - Encourage attendance of scheduled groups and activities  Outcome: Progressing

## 2023-07-02 NOTE — NURSING NOTE
Patient remained withdrawn in his room. Stated he has "a little" anxiety. Endorses AH but denied need for PRN  medication. Stated he has a radio in his room, which has been helpful for the [facility] [employer] Denies SI / HI. Showered. Good appetite for dinner.

## 2023-07-02 NOTE — NURSING NOTE
Pt withdrawn to room, napping at times. Denies SI/HI. Reports AH are unchanged. Feels safe on the unit. Declined prns. Educated Haldol increased to 10 mg and Lexapro increased to 10 mg. Denies any question or concern at this time.

## 2023-07-03 PROCEDURE — 99232 SBSQ HOSP IP/OBS MODERATE 35: CPT | Performed by: PSYCHIATRY & NEUROLOGY

## 2023-07-03 RX ORDER — MIRTAZAPINE 15 MG/1
7.5 TABLET, FILM COATED ORAL
Status: DISCONTINUED | OUTPATIENT
Start: 2023-07-03 | End: 2023-07-05

## 2023-07-03 RX ADMIN — HALOPERIDOL 10 MG: 5 TABLET ORAL at 17:09

## 2023-07-03 RX ADMIN — ESCITALOPRAM OXALATE 10 MG: 10 TABLET ORAL at 09:04

## 2023-07-03 RX ADMIN — TRAZODONE HYDROCHLORIDE 50 MG: 50 TABLET ORAL at 21:15

## 2023-07-03 RX ADMIN — HALOPERIDOL 10 MG: 5 TABLET ORAL at 09:03

## 2023-07-03 RX ADMIN — MIRTAZAPINE 7.5 MG: 15 TABLET, FILM COATED ORAL at 21:12

## 2023-07-03 NOTE — PROGRESS NOTES
Progress Note - [facility] 33 y.o. male MRN: [id number]  Unit/Bed#: U 204-01 Encounter: [id number]    Assessment:  Principal Problem:    Schizophrenia ([address])  Active Problems:    GERD (gastroesophageal reflux disease)    Medical clearance for psychiatric admission    Tobacco abuse      Plan:  --DC Trazodone; start Remeron 7.5mg PO HS for dual benefit of insomnia and negative symptoms of psychosis.   --Continue with psychiatric hospitalization  --Continue with individual, group, and milieu therapy  --Continue the following medications:  Current Facility-Administered Medications   Medication Dose Route Frequency   • acetaminophen (TYLENOL) tablet 650 mg  650 mg Oral Q6H PRN   • acetaminophen (TYLENOL) tablet 650 mg  650 mg Oral Q4H PRN   • acetaminophen (TYLENOL) tablet 975 mg  975 mg Oral Q6H PRN   • aluminum-magnesium hydroxide-simethicone (MYLANTA) oral suspension 30 mL  30 mL Oral Q4H PRN   • artificial tear (LUBRIFRESH P.M.) ophthalmic ointment   Both Eyes 4x Daily PRN   • haloperidol lactate (HALDOL) injection 2.5 mg  2.5 mg Intramuscular Q6H PRN Max 4/day    And   • LORazepam (ATIVAN) injection 1 mg  1 mg Intramuscular Q6H PRN Max 4/day    And   • benztropine (COGENTIN) injection 0.5 mg  0.5 mg Intramuscular Q6H PRN Max 4/day   • benztropine (COGENTIN) injection 1 mg  1 mg Intramuscular BID PRN   • haloperidol lactate (HALDOL) injection 5 mg  5 mg Intramuscular Q4H PRN Max 4/day    And   • LORazepam (ATIVAN) injection 2 mg  2 mg Intramuscular Q4H PRN Max 4/day    And   • benztropine (COGENTIN) injection 1 mg  1 mg Intramuscular Q4H PRN Max 4/day   • benztropine (COGENTIN) tablet 1 mg  1 mg Oral BID PRN   • bisacodyl (DULCOLAX) rectal suppository 10 mg  10 mg Rectal Daily PRN   • hydrOXYzine HCL (ATARAX) tablet 50 mg  50 mg Oral Q6H PRN Max 4/day    Or   • diphenhydrAMINE (BENADRYL) injection 50 mg  50 mg Intramuscular Q6H PRN   • escitalopram (LEXAPRO) tablet 10 mg  10 mg Oral Daily   • haloperidol (HALDOL) tablet 10 mg  10 mg Oral BID   • haloperidol (HALDOL) tablet 2 mg  2 mg Oral Q4H PRN Max 6/day   • haloperidol (HALDOL) tablet 5 mg  5 mg Oral Q6H PRN Max 4/day   • haloperidol (HALDOL) tablet 5 mg  5 mg Oral Q4H PRN Max 4/day   • hydrOXYzine HCL (ATARAX) tablet 100 mg  100 mg Oral Q6H PRN Max 4/day    Or   • LORazepam (ATIVAN) injection 2 mg  2 mg Intramuscular Q6H PRN   • hydrOXYzine HCL (ATARAX) tablet 25 mg  25 mg Oral Q6H PRN Max 4/day   • magnesium hydroxide (MILK OF MAGNESIA) oral suspension 30 mL  30 mL Oral Daily PRN   • mirtazapine (REMERON) tablet 7.5 mg  7.5 mg Oral HS   • nicotine (NICODERM CQ) 21 mg/24 hr TD 24 hr patch 1 patch  1 patch Transdermal Daily   • nicotine polacrilex (NICORETTE) gum 4 mg  4 mg Oral Q2H PRN   • senna-docusate sodium (SENOKOT S) 8.6-50 mg per tablet 1 tablet  1 tablet Oral Daily PRN   • traZODone (DESYREL) tablet 50 mg  50 mg Oral HS PRN       Subjective: Patient was seen for continuation of care. Chart was reviewed and discussed with treatment team.     No acute behavioral events over the past 24 hours. Today, patient was seen and examined at bedside for continuation of care. Today is the first day patients notes improvement in the intensity and volume of his auditory hallucinations. He says they are still there, but better overall. He requested changing Trazodone to a different sleep medication. Discussed starting Remeron. Patient denied adverse effects to their psychiatric medication regimen. Patient denied other new or worsening psychiatric symptoms/complaints at this time. Discussed the importance of continuing to take medications as prescribed, as well as the importance of continuing to attend groups on the unit.      Psychiatric Review of Systems:  Medication adverse effects: none  Sleep: unchanged  Appetite: unchanged  Behavior over the past 24 hours: as per above    Vitals:  Vitals:    07/03/23 0735   BP: 111/69   Pulse: 75   Resp: 17   Temp: (!) 97 °F (36.1 °C)   SpO2:        Laboratory results:    I have personally reviewed all pertinent laboratory/tests results  No results found for this or any previous visit (from the past 48 hour(s)). Current Medications:  Current medications as per above. All medications have been reviewed. Risks, benefits, alternatives, and possible side effects of patient's psychiatric medications were discussed with patient. Mental Status Evaluation:  Appearance: disheveled, appears consistent with stated age  Motor: no psychomotor disturbances, no gait abnormalities  Behavior: superficially cooperative, pleasant  Speech: normal rate and rhythm  Mood: "I feel a little better today"  Affect: blunted  Thought Process: organized  Thought Content: + (improving) auditory hallucinations, denies visual hallucinations, denies delusions  Risk Potential: denies suicidal ideation, plan, or intent. Denies homicidal ideation  Sensorium: Oriented to person, place, time, and situation  Cognition: cognitive ability appears intact but was not quantitatively tested  Consciousness: alert and awake  Attention: currently impaired  Insight: limited  Judgement: limited      Progress Toward Goals & Illness Status: Patient is not at goal. They are not yet ready for discharge. The patient's condition currently requires active psychopharmacological medication management, interdisciplinary coordination with case management, and the utilization of adjunctive milieu and group therapy to augment psychopharmacological efficacy. The patient's risk of morbidity, and progression or decompensation of psychiatric disease, is higher without this current treatment. This note has been constructed using a voice recognition system. There may be translation, syntax, or grammatical errors. If you have any questions, please contact the dictating provider.

## 2023-07-03 NOTE — NURSING NOTE
Pt napping this morning after breakfast. Compliant with meds and meals. Reports still having AH, which are occasionally still bothersome, but have overall been improving. Pt stated "I think the meds are starting to work now". Denies SI/HI/VH. Pleasant during conversation.

## 2023-07-03 NOTE — PROGRESS NOTES
Pt reported his AH is unchanged, seclusive to room and self. Utilized radio, slept. DC: TBD      07/03/23 0807   Team Meeting   Meeting Type Daily Rounds   Team Members Present   Team Members Present Physician;Nurse;; Other (Discipline and Name)   Physician Team Member Dr. Buzz Fleming / Dr. Brigid Wilburn / Chetna Ham / Wili Mosqueda Team Member Debbi Lin / [facility] Management Team Member Remberto Mayorga / Elinor Philip / Wolfgang Pagan / Elma Reyes   Other (Discipline and Name) Tiara Naresh - Art Therapy   Patient/Family Present   Patient Present No   Patient's Family Present No

## 2023-07-03 NOTE — PROGRESS NOTES
Pt depressed and withdrawn, reported negative AH. Pt declined PRNs. Napped. DC: TBD      06/30/23 0818   Team Meeting   Meeting Type Daily Rounds   Team Members Present   Team Members Present Physician;Nurse;; Other (Discipline and Name)   Physician Team Member Dr. Jesus John / Dr. Aydin Guzmán / Yong Villagomez Team Member Kaelyn Juarez / [facility] Management Team Member Baron King / Lucien Sanabria / Malia cMdonald   Other (Discipline and Name) Sudeep Romero - Art Therapy   Patient/Family Present   Patient Present No   Patient's Family Present No

## 2023-07-03 NOTE — TREATMENT TEAM
07/03/23 1030   Activity/Group Checklist   Group Other (Comment)  (art therapy)   Attendance Attended   Attendance Duration (min) 46-60   Interactions Interacted appropriately   Affect/Mood Appropriate;Calm   Goals Achieved Able to listen to others; Able to engage in interactions; Other (Comment)  (authentic, spontaneous self expression, connection, and insight)

## 2023-07-04 PROCEDURE — 99232 SBSQ HOSP IP/OBS MODERATE 35: CPT | Performed by: PSYCHIATRY & NEUROLOGY

## 2023-07-04 RX ADMIN — HALOPERIDOL 10 MG: 5 TABLET ORAL at 08:49

## 2023-07-04 RX ADMIN — TRAZODONE HYDROCHLORIDE 50 MG: 50 TABLET ORAL at 21:28

## 2023-07-04 RX ADMIN — ESCITALOPRAM OXALATE 10 MG: 10 TABLET ORAL at 08:49

## 2023-07-04 RX ADMIN — HALOPERIDOL 10 MG: 5 TABLET ORAL at 18:25

## 2023-07-04 RX ADMIN — MIRTAZAPINE 7.5 MG: 15 TABLET, FILM COATED ORAL at 21:28

## 2023-07-04 RX ADMIN — NICOTINE POLACRILEX 4 MG: 4 GUM, CHEWING BUCCAL at 19:27

## 2023-07-04 NOTE — PROGRESS NOTES
Progress Note - [address] 33 y.o. male MRN: [id number]  Unit/Bed#: [facility] [place] 204-01 Encounter: [id number]    Assessment:  Principal Problem:    Schizophrenia ([address])  Active Problems:    GERD (gastroesophageal reflux disease)    Medical clearance for psychiatric admission    Tobacco abuse      Plan:  --Continue with psychiatric hospitalization  --Continue with individual, group, and milieu therapy  --Continue the following medications:  Current Facility-Administered Medications   Medication Dose Route Frequency   • acetaminophen (TYLENOL) tablet 650 mg  650 mg Oral Q6H PRN   • acetaminophen (TYLENOL) tablet 650 mg  650 mg Oral Q4H PRN   • acetaminophen (TYLENOL) tablet 975 mg  975 mg Oral Q6H PRN   • aluminum-magnesium hydroxide-simethicone (MYLANTA) oral suspension 30 mL  30 mL Oral Q4H PRN   • artificial tear (LUBRIFRESH P.M.) ophthalmic ointment   Both Eyes 4x Daily PRN   • haloperidol lactate (HALDOL) injection 2.5 mg  2.5 mg Intramuscular Q6H PRN Max 4/day    And   • LORazepam (ATIVAN) injection 1 mg  1 mg Intramuscular Q6H PRN Max 4/day    And   • benztropine (COGENTIN) injection 0.5 mg  0.5 mg Intramuscular Q6H PRN Max 4/day   • benztropine (COGENTIN) injection 1 mg  1 mg Intramuscular BID PRN   • haloperidol lactate (HALDOL) injection 5 mg  5 mg Intramuscular Q4H PRN Max 4/day    And   • LORazepam (ATIVAN) injection 2 mg  2 mg Intramuscular Q4H PRN Max 4/day    And   • benztropine (COGENTIN) injection 1 mg  1 mg Intramuscular Q4H PRN Max 4/day   • benztropine (COGENTIN) tablet 1 mg  1 mg Oral BID PRN   • bisacodyl (DULCOLAX) rectal suppository 10 mg  10 mg Rectal Daily PRN   • hydrOXYzine HCL (ATARAX) tablet 50 mg  50 mg Oral Q6H PRN Max 4/day    Or   • diphenhydrAMINE (BENADRYL) injection 50 mg  50 mg Intramuscular Q6H PRN   • escitalopram (LEXAPRO) tablet 10 mg  10 mg Oral Daily   • haloperidol (HALDOL) tablet 10 mg  10 mg Oral BID   • haloperidol (HALDOL) tablet 2 mg  2 mg Oral Q4H PRN Max 6/day   • haloperidol (HALDOL) tablet 5 mg  5 mg Oral Q6H PRN Max 4/day   • haloperidol (HALDOL) tablet 5 mg  5 mg Oral Q4H PRN Max 4/day   • hydrOXYzine HCL (ATARAX) tablet 100 mg  100 mg Oral Q6H PRN Max 4/day    Or   • LORazepam (ATIVAN) injection 2 mg  2 mg Intramuscular Q6H PRN   • hydrOXYzine HCL (ATARAX) tablet 25 mg  25 mg Oral Q6H PRN Max 4/day   • magnesium hydroxide (MILK OF MAGNESIA) oral suspension 30 mL  30 mL Oral Daily PRN   • mirtazapine (REMERON) tablet 7.5 mg  7.5 mg Oral HS   • nicotine (NICODERM CQ) 21 mg/24 hr TD 24 hr patch 1 patch  1 patch Transdermal Daily   • nicotine polacrilex (NICORETTE) gum 4 mg  4 mg Oral Q2H PRN   • senna-docusate sodium (SENOKOT S) 8.6-50 mg per tablet 1 tablet  1 tablet Oral Daily PRN   • traZODone (DESYREL) tablet 50 mg  50 mg Oral HS PRN       Subjective: Patient was seen for continuation of care. Chart was reviewed and discussed with treatment team.     No acute behavioral events over the past 24 hours. Today, patient was seen and examined at bedside for continuation of care. Patient reports continued improvement in intensity and frequency of AH, but still has them. He slept better last night after being started on Remeron. Patient denied adverse effects to their psychiatric medication regimen. Patient denied other new or worsening psychiatric symptoms/complaints at this time. Discussed the importance of continuing to take medications as prescribed, as well as the importance of continuing to attend groups on the unit. Psychiatric Review of Systems:  Medication adverse effects: none  Sleep: unchanged  Appetite: unchanged  Behavior over the past 24 hours: as per above    Vitals:  Vitals:    07/04/23 0750   BP: 118/75   Pulse: 77   Resp: 16   Temp: (!) 96.7 °F (35.9 °C)   SpO2:        Laboratory results:    I have personally reviewed all pertinent laboratory/tests results  No results found for this or any previous visit (from the past 48 hour(s)). Current Medications:  Current medications as per above. All medications have been reviewed. Risks, benefits, alternatives, and possible side effects of patient's psychiatric medications were discussed with patient. Mental Status Evaluation:  Appearance: disheveled, appears consistent with stated age  Motor: mild PMR, no gait abnormalities  Behavior: superficially cooperative  Speech: normal rate and rhythm  Mood: "okay"  Affect: blunted  Thought Process: more organized  Thought Content: + improving auditory hallucinations, denies visual hallucinations, denies delusions  Risk Potential: denies suicidal ideation, plan, or intent. Denies homicidal ideation  Sensorium: Oriented to person, place, time, and situation  Cognition: cognitive ability appears intact but was not quantitatively tested  Consciousness: alert and awake  Attention: currently impaired  Insight: fair  Judgement: fair      Progress Toward Goals & Illness Status: Patient is not at goal. They are not yet ready for discharge. The patient's condition currently requires active psychopharmacological medication management, interdisciplinary coordination with case management, and the utilization of adjunctive milieu and group therapy to augment psychopharmacological efficacy. The patient's risk of morbidity, and progression or decompensation of psychiatric disease, is higher without this current treatment. This note has been constructed using a voice recognition system. There may be translation, syntax, or grammatical errors. If you have any questions, please contact the dictating provider.

## 2023-07-04 NOTE — NURSING NOTE
Pt remains seclusive to room throughout the day, OOB for meals, meds. Pleasant during interactions, when in milieu pt is social with peers. +AH, reports sleeping helps, does utilize sound machine or radio at times. Encouraged OOB to attend groups.

## 2023-07-04 NOTE — NURSING NOTE
Patient noted to be napping throughout the evening, Attends snack and comes to nurses station for HS medications. Easily engaged. Blunted affect. Fair concentration. Does not appear to respond to internal stimuli. Reports ongoing unspecified AH that have improved from the days prior. Denies SI/HI and VH. He is hopeful for better sleep tonight with Remeron. Maintained on Q 7 minute checks.

## 2023-07-04 NOTE — NURSING NOTE
F/U to Trazodone administration at 2115 hrs as sleep aid, Pt appears to be asleep in bed, no difficulty observed.

## 2023-07-04 NOTE — TREATMENT TEAM
07/04/23 0900   Team Meeting   Meeting Type Daily Rounds   Team Members Present   Team Members Present Physician;Nurse   Physician Team Member [facility]   Nursing Team Member Cony   Patient/Family Present   Patient Present No   Patient's Family Present No     AM Rounds:  napping often, visible at times, social with select peers, reports improving AH, Remeron increased last night to 7.5mg, pt tired this morning, OOB for breakfast

## 2023-07-04 NOTE — NURSING NOTE
Pt reports sleeping and eating well. His mood is good and has improved since admission, he denies issues with anxiety, and denies SI/HI. He reports AH continue to be constant and tell him the same things as before but he is starting to be able to ignore them more easily and feels the medication is helping. He attends meals and some groups but is often seclusive to his room. Blunted affect, cooperative.

## 2023-07-05 PROCEDURE — 99232 SBSQ HOSP IP/OBS MODERATE 35: CPT | Performed by: PSYCHIATRY & NEUROLOGY

## 2023-07-05 RX ORDER — MIRTAZAPINE 15 MG/1
15 TABLET, FILM COATED ORAL
Status: DISCONTINUED | OUTPATIENT
Start: 2023-07-05 | End: 2023-07-07 | Stop reason: HOSPADM

## 2023-07-05 RX ADMIN — MIRTAZAPINE 15 MG: 15 TABLET, FILM COATED ORAL at 21:28

## 2023-07-05 RX ADMIN — HALOPERIDOL 10 MG: 5 TABLET ORAL at 09:14

## 2023-07-05 RX ADMIN — HALOPERIDOL 10 MG: 5 TABLET ORAL at 17:06

## 2023-07-05 RX ADMIN — ESCITALOPRAM OXALATE 10 MG: 10 TABLET ORAL at 09:14

## 2023-07-05 RX ADMIN — NICOTINE POLACRILEX 4 MG: 4 GUM, CHEWING BUCCAL at 17:09

## 2023-07-05 RX ADMIN — TRAZODONE HYDROCHLORIDE 50 MG: 50 TABLET ORAL at 21:31

## 2023-07-05 NOTE — NURSING NOTE
Pt requested and received PRN trazodone 50 mg PO r/t inability to sleep. Pt is resting comfortably in bed at this time with eyes closed.

## 2023-07-05 NOTE — NURSING NOTE
Patient has been napping most of the morning. He reports having a full night of sleep but still feels sleepy. Has been using naps and the radio to block out AH.

## 2023-07-05 NOTE — PROGRESS NOTES
Progress Note - [address] 33 y.o. male MRN: [id number]  Unit/Bed#: U 204-01 Encounter: [id number]    Assessment:  Principal Problem:    Schizophrenia ([address])  Active Problems:    GERD (gastroesophageal reflux disease)    Medical clearance for psychiatric admission    Tobacco abuse      Plan:  --Increase Remeron to 15mg PO HS  --Continue with psychiatric hospitalization  --Continue with individual, group, and milieu therapy  --Continue the following medications:  Current Facility-Administered Medications   Medication Dose Route Frequency   • acetaminophen (TYLENOL) tablet 650 mg  650 mg Oral Q6H PRN   • acetaminophen (TYLENOL) tablet 650 mg  650 mg Oral Q4H PRN   • acetaminophen (TYLENOL) tablet 975 mg  975 mg Oral Q6H PRN   • aluminum-magnesium hydroxide-simethicone (MYLANTA) oral suspension 30 mL  30 mL Oral Q4H PRN   • artificial tear (LUBRIFRESH P.M.) ophthalmic ointment   Both Eyes 4x Daily PRN   • haloperidol lactate (HALDOL) injection 2.5 mg  2.5 mg Intramuscular Q6H PRN Max 4/day    And   • LORazepam (ATIVAN) injection 1 mg  1 mg Intramuscular Q6H PRN Max 4/day    And   • benztropine (COGENTIN) injection 0.5 mg  0.5 mg Intramuscular Q6H PRN Max 4/day   • benztropine (COGENTIN) injection 1 mg  1 mg Intramuscular BID PRN   • haloperidol lactate (HALDOL) injection 5 mg  5 mg Intramuscular Q4H PRN Max 4/day    And   • LORazepam (ATIVAN) injection 2 mg  2 mg Intramuscular Q4H PRN Max 4/day    And   • benztropine (COGENTIN) injection 1 mg  1 mg Intramuscular Q4H PRN Max 4/day   • benztropine (COGENTIN) tablet 1 mg  1 mg Oral BID PRN   • bisacodyl (DULCOLAX) rectal suppository 10 mg  10 mg Rectal Daily PRN   • hydrOXYzine HCL (ATARAX) tablet 50 mg  50 mg Oral Q6H PRN Max 4/day    Or   • diphenhydrAMINE (BENADRYL) injection 50 mg  50 mg Intramuscular Q6H PRN   • escitalopram (LEXAPRO) tablet 10 mg  10 mg Oral Daily   • haloperidol (HALDOL) tablet 10 mg  10 mg Oral BID   • haloperidol (HALDOL) tablet 2 mg  2 mg Oral Q4H PRN Max 6/day   • haloperidol (HALDOL) tablet 5 mg  5 mg Oral Q6H PRN Max 4/day   • haloperidol (HALDOL) tablet 5 mg  5 mg Oral Q4H PRN Max 4/day   • hydrOXYzine HCL (ATARAX) tablet 100 mg  100 mg Oral Q6H PRN Max 4/day    Or   • LORazepam (ATIVAN) injection 2 mg  2 mg Intramuscular Q6H PRN   • hydrOXYzine HCL (ATARAX) tablet 25 mg  25 mg Oral Q6H PRN Max 4/day   • magnesium hydroxide (MILK OF MAGNESIA) oral suspension 30 mL  30 mL Oral Daily PRN   • mirtazapine (REMERON) tablet 15 mg  15 mg Oral HS   • nicotine (NICODERM CQ) 21 mg/24 hr TD 24 hr patch 1 patch  1 patch Transdermal Daily   • nicotine polacrilex (NICORETTE) gum 4 mg  4 mg Oral Q2H PRN   • senna-docusate sodium (SENOKOT S) 8.6-50 mg per tablet 1 tablet  1 tablet Oral Daily PRN   • traZODone (DESYREL) tablet 50 mg  50 mg Oral HS PRN       Subjective: Patient was seen for continuation of care. Chart was reviewed and discussed with treatment team.     No acute behavioral events over the past 24 hours. Today, patient was seen and examined at bedside for continuation of care. Patient notes continued improvement in AH. He states they still are there, but they are quieter and he is better able to cope with them. Patient still c/o insomnia, discussed increasing Remeron to help optimize sleep. Patient denied adverse effects to their psychiatric medication regimen. Patient denied other new or worsening psychiatric symptoms/complaints at this time. Discussed the importance of continuing to take medications as prescribed, as well as the importance of continuing to attend groups on the unit.      Psychiatric Review of Systems:  Medication adverse effects: none  Sleep: unchanged  Appetite: unchanged  Behavior over the past 24 hours: as per above    Vitals:  Vitals:    07/05/23 0742   BP: 140/81   Pulse: 68   Resp: 18   Temp: 98.6 °F (37 °C)   SpO2:        Laboratory results:    I have personally reviewed all pertinent laboratory/tests results  No results found for this or any previous visit (from the past 48 hour(s)). Current Medications:  Current medications as per above. All medications have been reviewed. Risks, benefits, alternatives, and possible side effects of patient's psychiatric medications were discussed with patient. Mental Status Evaluation:  Appearance: moderately kempt  Motor: +mild PMR  Behavior: cooperative, pleasant  Speech: normal rate, rhythm, and volume  Mood: "a little better"  Affect: mood-congruent, anxious appearing  Thought Process: organized and linear  Thought Content: +(improving) auditory hallucinations, denies visual hallucinations, denies delusions  Risk Potential: denies suicidal ideation, plan, or intent. Denies homicidal ideation  Sensorium: Oriented to person, place, time, and situation  Cognition: cognitive ability appears intact but was not quantitatively tested  Consciousness: alert and awake  Attention: currently intact  Insight: fair  Judgement: fair      Progress Toward Goals & Illness Status: Patient is not at goal. They are not yet ready for discharge. The patient's condition currently requires active psychopharmacological medication management, interdisciplinary coordination with case management, and the utilization of adjunctive milieu and group therapy to augment psychopharmacological efficacy. The patient's risk of morbidity, and progression or decompensation of psychiatric disease, is higher without this current treatment. This note has been constructed using a voice recognition system. There may be translation, syntax, or grammatical errors. If you have any questions, please contact the dictating provider.

## 2023-07-05 NOTE — NURSING NOTE
Patient required PRN Trazodone to assist in falling asleep (see MAR). Appeared to sleep well throughout the night. Maintained on Q 7 minute checks.

## 2023-07-05 NOTE — PROGRESS NOTES
Pt slept most of the day. Reported using radio and sleep help him block out AH. Reported AH is not improving but medications starting to help. DC: TBD    07/05/23 0807   Team Meeting   Meeting Type Daily Rounds   Team Members Present   Team Members Present Physician;Nurse;; Other (Discipline and Name)   Physician Team Member Dr. Buzz Fleming / Dr. Brigid Wilburn / Chetna Ham / Wili Mosqueda Team Member Debbi Lin / [facility] Management Team Member Remberto Mayorga / Elinor Philip / Elma Reyes   Other (Discipline and Name) Tiara Infante - Art Therapy   Patient/Family Present   Patient Present No   Patient's Family Present No

## 2023-07-06 PROCEDURE — 99232 SBSQ HOSP IP/OBS MODERATE 35: CPT | Performed by: PSYCHIATRY & NEUROLOGY

## 2023-07-06 RX ORDER — ESCITALOPRAM OXALATE 10 MG/1
10 TABLET ORAL DAILY
Qty: 30 TABLET | Refills: 0 | Status: CANCELLED | OUTPATIENT
Start: 2023-07-06 | End: 2023-08-05

## 2023-07-06 RX ORDER — HALOPERIDOL 10 MG/1
10 TABLET ORAL 2 TIMES DAILY
Qty: 60 TABLET | Refills: 0 | Status: CANCELLED | OUTPATIENT
Start: 2023-07-06 | End: 2023-08-05

## 2023-07-06 RX ORDER — MIRTAZAPINE 15 MG/1
15 TABLET, FILM COATED ORAL
Qty: 30 TABLET | Refills: 0 | Status: CANCELLED | OUTPATIENT
Start: 2023-07-06 | End: 2023-08-05

## 2023-07-06 RX ADMIN — HALOPERIDOL 10 MG: 5 TABLET ORAL at 09:12

## 2023-07-06 RX ADMIN — MIRTAZAPINE 15 MG: 15 TABLET, FILM COATED ORAL at 21:21

## 2023-07-06 RX ADMIN — TRAZODONE HYDROCHLORIDE 50 MG: 50 TABLET ORAL at 21:20

## 2023-07-06 RX ADMIN — ESCITALOPRAM OXALATE 10 MG: 10 TABLET ORAL at 09:12

## 2023-07-06 RX ADMIN — NICOTINE POLACRILEX 4 MG: 4 GUM, CHEWING BUCCAL at 12:08

## 2023-07-06 RX ADMIN — HALOPERIDOL 10 MG: 5 TABLET ORAL at 17:13

## 2023-07-06 NOTE — DISCHARGE INSTR - APPOINTMENTS
You will be discharged to [address]   You confirmed that your cell phone number is: [phone]        Thuan Caceres or Mary Jo, ren Lazo and Kaley, will be calling you after your discharge, on the phone number that you provided. They will be available as an additional support, if needed. If you wish to speak with one of them, you may contact Thuan Caceres at [phone] or Doe Morelos at [phone].

## 2023-07-06 NOTE — NURSING NOTE
Orlando Cantor is calm upon approach, visualized resting in bed and reports napping intermittently throughout the day. Patient also observed walking the halls for brief periods this evening. Patient denies current SI/HI/VH, endorses ongoing AH. Alberto inquired about signing a 72 hour notice. This writer explained process to patient and encouraged patient to speak with provider in the [employer] reports, "I feel good. I'm doing better and I just feel like it's time for me to go." Patient was encouraged to attend evening group, but declined. Orladno Cantor was encouraged to seek staff with any questions and/or concerns.

## 2023-07-06 NOTE — PROGRESS NOTES
Progress Note - [address] 33 y.o. male MRN: [id number]  Unit/Bed#: U 204-01 Encounter: [id number]    Assessment:  Principal Problem:    Schizophrenia ([address])  Active Problems:    GERD (gastroesophageal reflux disease)    Medical clearance for psychiatric admission    Tobacco abuse      Plan:  --DC Tomorrow  --Continue with psychiatric hospitalization  --Continue with individual, group, and milieu therapy  --Continue the following medications:  Current Facility-Administered Medications   Medication Dose Route Frequency   • acetaminophen (TYLENOL) tablet 650 mg  650 mg Oral Q6H PRN   • acetaminophen (TYLENOL) tablet 650 mg  650 mg Oral Q4H PRN   • acetaminophen (TYLENOL) tablet 975 mg  975 mg Oral Q6H PRN   • aluminum-magnesium hydroxide-simethicone (MYLANTA) oral suspension 30 mL  30 mL Oral Q4H PRN   • artificial tear (LUBRIFRESH P.M.) ophthalmic ointment   Both Eyes 4x Daily PRN   • haloperidol lactate (HALDOL) injection 2.5 mg  2.5 mg Intramuscular Q6H PRN Max 4/day    And   • LORazepam (ATIVAN) injection 1 mg  1 mg Intramuscular Q6H PRN Max 4/day    And   • benztropine (COGENTIN) injection 0.5 mg  0.5 mg Intramuscular Q6H PRN Max 4/day   • benztropine (COGENTIN) injection 1 mg  1 mg Intramuscular BID PRN   • haloperidol lactate (HALDOL) injection 5 mg  5 mg Intramuscular Q4H PRN Max 4/day    And   • LORazepam (ATIVAN) injection 2 mg  2 mg Intramuscular Q4H PRN Max 4/day    And   • benztropine (COGENTIN) injection 1 mg  1 mg Intramuscular Q4H PRN Max 4/day   • benztropine (COGENTIN) tablet 1 mg  1 mg Oral BID PRN   • bisacodyl (DULCOLAX) rectal suppository 10 mg  10 mg Rectal Daily PRN   • hydrOXYzine HCL (ATARAX) tablet 50 mg  50 mg Oral Q6H PRN Max 4/day    Or   • diphenhydrAMINE (BENADRYL) injection 50 mg  50 mg Intramuscular Q6H PRN   • escitalopram (LEXAPRO) tablet 10 mg  10 mg Oral Daily   • haloperidol (HALDOL) tablet 10 mg  10 mg Oral BID   • haloperidol (HALDOL) tablet 2 mg  2 mg Oral Q4H PRN Max 6/day   • haloperidol (HALDOL) tablet 5 mg  5 mg Oral Q6H PRN Max 4/day   • haloperidol (HALDOL) tablet 5 mg  5 mg Oral Q4H PRN Max 4/day   • hydrOXYzine HCL (ATARAX) tablet 100 mg  100 mg Oral Q6H PRN Max 4/day    Or   • LORazepam (ATIVAN) injection 2 mg  2 mg Intramuscular Q6H PRN   • hydrOXYzine HCL (ATARAX) tablet 25 mg  25 mg Oral Q6H PRN Max 4/day   • magnesium hydroxide (MILK OF MAGNESIA) oral suspension 30 mL  30 mL Oral Daily PRN   • mirtazapine (REMERON) tablet 15 mg  15 mg Oral HS   • nicotine (NICODERM CQ) 21 mg/24 hr TD 24 hr patch 1 patch  1 patch Transdermal Daily   • nicotine polacrilex (NICORETTE) gum 4 mg  4 mg Oral Q2H PRN   • senna-docusate sodium (SENOKOT S) 8.6-50 mg per tablet 1 tablet  1 tablet Oral Daily PRN   • traZODone (DESYREL) tablet 50 mg  50 mg Oral HS PRN       Subjective: Patient was seen for continuation of care. Chart was reviewed and discussed with treatment team.     No acute behavioral events over the past 24 hours. Today, patient was seen and examined at bedside for continuation of care. Patient reports continued improvement in mood and psychotic symptoms. He says his AH are minimal, and he is better able to cope with them. He is requesting discharge. He is future oriented and inquiring about aftercare. Patient denied adverse effects to their psychiatric medication regimen. Patient denied other new or worsening psychiatric symptoms/complaints at this time. Discussed the importance of continuing to take medications as prescribed, as well as the importance of continuing to attend groups on the unit.      Psychiatric Review of Systems:  Medication adverse effects: none  Sleep: unchanged  Appetite: unchanged  Behavior over the past 24 hours: as per above    Vitals:  Vitals:    07/06/23 0734   BP: 142/84   Pulse: 76   Resp: 16   Temp: (!) 97 °F (36.1 °C)   SpO2:        Laboratory results:    I have personally reviewed all pertinent laboratory/tests results  No results found for this or any previous visit (from the past 50 hour(s)). Current Medications:  Current medications as per above. All medications have been reviewed. Risks, benefits, alternatives, and possible side effects of patient's psychiatric medications were discussed with patient. Mental Status Evaluation:  Appearance: casually dressed, appears consistent with stated age  Motor: mild PMR, no gait abnormalities  Behavior: cooperative, interacts with this writer appropriately  Speech: normal rate, rhythm, and volume  Mood: "better"  Affect: constricted  Thought Process: organized, linear, and goal-oriented  Thought Content: +mild auditory hallucinations, denies visual hallucinations, denies delusions  Risk Potential: denies suicidal ideation, plan, or intent. Denies homicidal ideation  Sensorium: Oriented to person, place, time, and situation  Cognition: cognitive ability appears intact but was not quantitatively tested  Consciousness: alert and awake  Attention: able to focus without difficulty  Insight: improved  Judgement: improved      Progress Toward Goals & Illness Status: Patient is not at goal. They are not yet ready for discharge. The patient's condition currently requires active psychopharmacological medication management, interdisciplinary coordination with case management, and the utilization of adjunctive milieu and group therapy to augment psychopharmacological efficacy. The patient's risk of morbidity, and progression or decompensation of psychiatric disease, is higher without this current treatment. This note has been constructed using a voice recognition system. There may be translation, syntax, or grammatical errors. If you have any questions, please contact the dictating provider.

## 2023-07-06 NOTE — TREATMENT TEAM
07/06/23 1300   Activity/Group Checklist   Group Other (Comment)  (structured journaling)   Attendance Attended   Attendance Duration (min) Greater than 60   Interactions Interacted appropriately   Affect/Mood Appropriate   Goals Achieved Able to engage in interactions; Able to listen to others; Other (Comment)  (fully participated in the structured journaling)

## 2023-07-06 NOTE — CASE MANAGEMENT
NEWTON called [employer] ([phone]) to see if pt has active [place] medicaid as he reported he applied for it prior to coming into hospital. Pt reported he came from [place] about 1 month ago and had active [employer] but reported he did apply for [place] medicaid. NEWTON called [facility] to see if they would be able to county fund pt for OP services until his [place] medicaid is active. CM spoke to [place] and she reported they would not be able to fund him since he technically has active [employer]. She reported he will need to cancel his [employer] and show proof of that to [facility] [address] for him to be able to receive benefits from [facility])     Cruzmichaelmal reported that once he cancels his [place] Medicaid, pt can contact them and they can assist him with applying/getting [place] medicaid set [place] so he will be eligible for [place] services in [place] [facility]. CM will inform pt and treatment team of this information.

## 2023-07-06 NOTE — NURSING NOTE
Patient initially requested medication to aid with sleep. Patient appeared to have slept the remainder of the shift without any further difficulty. All safety precautions maintained.

## 2023-07-06 NOTE — PROGRESS NOTES
Pt naps on and off during the day, asked about a 72 hour notice but did not sign. Reported he is ready to dc. Pt scant in conversation. Took PRN Trazodone. DC: Friday 07/06/23 8594   Team Meeting   Meeting Type Daily Rounds   Team Members Present   Team Members Present Physician;Nurse;; Other (Discipline and Name)   Physician Team Member Dr. Tami Jones / Dr. Deysi Calvin / Bernardo Young / Elizabeth Dykes Team Member Lorenzo Casey / Elenita Flores / [facility] Management Team Member Johann Hernandez / Lacey Lima / Live Jones   Other (Discipline and Name) Carmita Hensley - Art Therapy   Patient/Family Present   Patient Present No   Patient's Family Present No

## 2023-07-06 NOTE — PROGRESS NOTES
Diagnosis of Schizophrenia reviewed. Short term goals for decrease in depressive symptoms, decrease in psychotic symptoms discussed. All present parties in agreement and treatment plan signed.     06/30/23 1429   Team Meeting   Meeting Type Tx Team Meeting   Team Members Present   Team Members Present Physician;Nurse;   Physician Team Member Dr. Ganesh Jurado Team Member [facility] Management Team Member Swapnil   Patient/Family Present   Patient Present No  (pt declined to meet with treatment team)   Patient's Family Present No

## 2023-07-06 NOTE — DISCHARGE INSTR - OTHER ORDERS
Once your [place] Medicaid is cancelled and you have active [place] Medicaid, you can call the below Outpatient Providers to schedule an intake for Therapy and Medication Management     [facility] - [place], [address] - Phone: [address], [address] - Phone: [phone]    The [address] [place] [address] - Phone: [phone]    [facility] - [facility] Vick[place], [address] - Phone: [address] [place], [place]. Maday Kinsey - Phone: [phone]      24[address], Jyothi Bright;  Call: [address] Free:  [facility]: [id number]    The Bruno Clairedyce is for persons from [place] and [place]  Phone: [phone]  [facility]:  [phone]  *Alcohol Anonymous: 268.529.6670  *[facility]: [phone] [address] on [address] [place] [address] (Maday) [employer]: 588.252.6278/Website: www.juanito.Wisegate  *Substance Abuse and [address] Administration([facility]) [employer], which is a confidential, free, 24-hour-a-day, 365-day-a-year, information service for individuals and family members facing mental health and/or substance use disorders. This service provides referrals to local treatment facilities, support groups, and community-based organizations. Callers can also order free publications and other information. Call 2-496.116.5561/Website: www.[facility].gov  *[employer] 2-1-1: This is a toll free, confidential, 24-hour-a-day service which connects you to a community  in your area who can help you find services and resources that are available to you locally and provide critical services that can improve and save lives.   Call: 211  /Website: https://donaldCocodotking.net/

## 2023-07-06 NOTE — NURSING NOTE
Pt remains social with select peers, denies SI/HI, VH. Reports feeling that mood has overall improved as well as AH and pt states feeling hopeful to be able to discharge soon. Encouraged attendance to groups to help develop healthy coping skill. s pt reports sleeping well overnight and denies any questions regarding scheduled medications.

## 2023-07-06 NOTE — NURSING NOTE
Pt remains pleasant and calm. Denies SI/HI, continues to report diminished AH. Denies VH. States looking forward to discharge and hopeful this will happen tomorrow. States having good supports at home. Reports sleeping well. Denies any questions at this time.

## 2023-07-06 NOTE — PLAN OF CARE
Problem: ANXIETY  Goal: Will report anxiety at manageable levels  Description: INTERVENTIONS:  - Administer medication as ordered  - Teach and encourage coping skills  - Provide emotional support  - Assess patient/family for anxiety and ability to cope  Outcome: Progressing     Problem: Ineffective Coping  Goal: Participates in unit activities  Description: Interventions:  - Provide therapeutic environment   - Provide required programming   - Redirect inappropriate behaviors   Outcome: Progressing

## 2023-07-06 NOTE — NURSING NOTE
F/U to Trazodone 50 mg Po administered as sleep aid at 2131 hrs, Pt asleep in bed, easily aroused, no difficulty observed.

## 2023-07-06 NOTE — CASE MANAGEMENT
INTAKE    Readmit score:  Yellow 20    Confirmed Address   [address] [place]: [place]      Resides in the home with/can return?:    Pt lives with his Keri Printers and can return     Pt reported he moved to [place] one month ago from [place]. Confirmed Phone Number: [phone]   Commitment Status/Admitted from: [facility] ED    Presenting C/O:             ED Note   "  Hallucinations        Pt states experiencing auditory hallucinations that started this evening. Pt denies command hallucinations. Denies visual disturbances. Denies SI/HI. States previously followed with a psychiatrist but has not been following with them now.       43-year-old male patient with history of schizophrenia, on the appropriate medications, unfortunately ran out of his medicines approximately 2 weeks ago. Since running out of his medications the patient was not able to get any sleep. He has been awake for over 1 week at this point. The patient has been hallucinating now for the last several days and came into the emergency department for evaluation. The patient is awake alert and oriented, here willingly, he is exhibiting no signs which would make him a danger to himself or others. The patient is asking for inpatient psychiatric care so as to get back on medications and get leveled out. If at any point in time, however, the patient would ask for discharge and would have absolutely no problem discharging this patient with a refill of his prescription medications."     Outpatient:    Pt is not connected with OP in [place], pt reported he had Outpatient in [place] but moved to [place] 1 month ago. CM will look into OP options for pt based on his insurance.       ACT/ICM/CPS/WRT/SC: N/A   PCP:    Lauri Rodriguez MD  [phone]   Work/Income:      Unemployed   Pt reported he recives SSD $1700 per month    Legal/  Probation/[place] Ofc:    Denies   Access to Firearms:    Denies   Referrals Needed: TBD    Transport at Discharge:    Pt reported family can pick him up    IMM:   Blanca Text ALEXIA: N/A   Emergency Contact:     Isaias Gillespien ([address]) [phone]   ROIs obtained:       Outpatient Services    Insurance:     Pt has active [place] Medicaid     Pt reported that prior to coming into [facility], he applied for [place] medicaid but has not heard anything yet.       Audit:        PAWSS:  BAT:  UDS: Negative

## 2023-07-07 VITALS
HEART RATE: 77 BPM | SYSTOLIC BLOOD PRESSURE: 145 MMHG | DIASTOLIC BLOOD PRESSURE: 87 MMHG | WEIGHT: 212.08 LBS | OXYGEN SATURATION: 100 % | TEMPERATURE: 98.6 F | HEIGHT: 67 IN | BODY MASS INDEX: 33.29 KG/M2 | RESPIRATION RATE: 17 BRPM

## 2023-07-07 PROCEDURE — 99239 HOSP IP/OBS DSCHRG MGMT >30: CPT | Performed by: PSYCHIATRY & NEUROLOGY

## 2023-07-07 RX ORDER — ESCITALOPRAM OXALATE 10 MG/1
10 TABLET ORAL DAILY
Qty: 30 TABLET | Refills: 0 | Status: ON HOLD | OUTPATIENT
Start: 2023-07-07 | End: 2023-08-06

## 2023-07-07 RX ORDER — HALOPERIDOL 10 MG/1
10 TABLET ORAL 2 TIMES DAILY
Qty: 60 TABLET | Refills: 0 | Status: ON HOLD | OUTPATIENT
Start: 2023-07-07 | End: 2023-08-06

## 2023-07-07 RX ORDER — MIRTAZAPINE 15 MG/1
15 TABLET, FILM COATED ORAL
Qty: 30 TABLET | Refills: 0 | Status: ON HOLD | OUTPATIENT
Start: 2023-07-07 | End: 2023-08-06

## 2023-07-07 RX ADMIN — HALOPERIDOL 10 MG: 5 TABLET ORAL at 08:56

## 2023-07-07 RX ADMIN — ESCITALOPRAM OXALATE 10 MG: 10 TABLET ORAL at 08:56

## 2023-07-07 NOTE — NURSING NOTE
Patient is observed resting throughout the night with eyes closed. Patient awoke this morning at approximately 05:20. Q7 minute observational rounds maintained for promotion of patient safety.

## 2023-07-07 NOTE — BH TRANSITION RECORD
Contact Information: If you have any questions, concerns, pended studies, tests and/or procedures, or emergencies regarding your inpatient behavioral health visit. Please contact [place] [facility] [phone] and ask to speak to a , nurse or physician. A contact is available 24 hours/ 7 days a week at this number. Summary of Procedures Performed During your Stay:  Below is a list of major procedures performed during your hospital stay and a summary of results:  - No major procedures performed. Pending Studies (From admission, onward)    None        Please follow up on the above pending studies with your PCP and/or referring provider.

## 2023-07-07 NOTE — NURSING NOTE
AVS discharge instructions reviewed with patient. Patient denies any questions or concerns and expresses readiness for discharge. Patient discharged from the unit pleasant and calm.

## 2023-07-07 NOTE — TREATMENT TEAM
07/07/23 0920   Activity/Group Checklist   Group Admission/Discharge   Attendance Attended   Attendance Duration (min) 0-15   Interactions Interacted appropriately   Affect/Mood Appropriate   Goals Achieved Able to engage in interactions; Able to listen to others; Identified relapse prevention strategies; Discussed coping strategies; Identified resources and support systems; Other (Comment)  (pt independently filled out the relapse prevention plan from with this therapist available for support if needed. once completed, pt had no questions or concerns at this time.  crisis #s highlighted on form and copy of form placed in DC folder.)

## 2023-07-07 NOTE — NURSING NOTE
Pt remains calm and cooperative. Denies SI/HI, expresses feeling that mood has improved and feels readiness for discharge today. Pt states having good supports in the community. States sleeping well overnight. Reports AH are diminished and pt denies any questions at this time.

## 2023-07-07 NOTE — PROGRESS NOTES
Pt reported AH has improved, naps off and on during the day. Reported feeling ready for dc today. DC: Today - pt family will pick him up around 10am      07/07/23 4198   Team Meeting   Meeting Type Daily Rounds   Team Members Present   Team Members Present Physician;Nurse;; Other (Discipline and Name)   Physician Team Member Dr. Paxton Mcgregor / Dr. Josue Alves / Estela Abdi / Rosalino Cruz Team Member Claudia Sarkar / [facility] Management Team Member Aron Rooney / Severiano Destine / Nayla Padron   Other (Discipline and Name) Lilliana Enid - Art Therapy   Patient/Family Present   Patient Present No   Patient's Family Present No

## 2023-07-07 NOTE — DISCHARGE SUMMARY
Discharge Summary [facility] 33 y.o. male MRN: [id number]  Unit/Bed#: U 204-01 Encounter: [id number]     Admission Date: 6/28/2023         Discharge Date: 07/07/23    Attending Psychiatrist: Dayana Castillo DO    Reason for Admission/HPI (as per admission documentation):     Per Crisis worker note:  Pt is a 35 y. o. male who presented to the ED due to increased depression, anxiety, and auditory hallucinations.  Patient reports that he has had numerous inpatient admissions at facilities in [place], last approximately 4 months ago. Toya Ahn also reports that he has been linked with outpatient providers in the past, although is not currently seeing a psychiatrist or therapist here in [facility] [address] does report that he recently moved from [place] to [place] approximately 1 month ago, and has not sought treatment as he just recently completed for Medicaid smiley. Toya Ahn reports that he ran out of his medications.  Patient denies any substance use, but does indicate that he smokes cigarettes on a daily basis but wishes to quit. Toya Ahn also denies suicidal thoughts at this time, but reports previous thoughts.  Patient, however, does deny prior self injurious behaviors or suicide attempts of any kind. Toya Ahn also denies homicidal thoughts as well as visual hallucinations and reports that the auditory hallucinations are "new".  Patient does indicate that he has a good support system which includes his aunt who he resides with, as well has his siblings. Toya Ahn also reports extremely poor sleep stating that last evening was the first time he slept in approximately 2 weeks.  Patient also reports a decreased appetite recently.  At this time, the patient is requesting an inpatient level of care as he feels he needs to "take care of his mental health and get back on medications".  Discussed treatment options with the patient who is now willing to sign a 201.     On admission to Inpatient Psychiatric Unit:  Patient is a 52-year-old black male with a past psychiatric history of schizophrenia and tobacco abuse presenting voluntarily to inpatient U with progressively worsening command auditory hallucinations over the past 2 to 4 weeks. Patient states that he ran out of psych medications approximately 2 weeks ago, around which time the patient moved from [facility] to [place], and therefore did not have any outpatient psychiatric follow-up or means of refilling his medications. The patient therefore has had worsening psychotic symptoms including command auditory hallucinations and paranoia and depression related to these auditory hallucinations over the past 2 to 4 weeks. There are are no other exacerbating stressors other than running out of his medication. Mitigating factors include support from his aunt, who the patient lives with. Timeline is 2 to 4 weeks progressively worsening. Symptom severity is rated as severe. Patient denies visual hallucinations, SI, or HI. He does admit to feeling a little depressed about his psychotic symptoms and includes symptoms of low mood, hopelessness, helplessness, decreased energy, decreased motivation. He states that in the past he was well controlled on Haldol, Lexapro. History reviewed. No pertinent past medical history.   Past Surgical History:   Procedure Laterality Date   • COLONOSCOPY         Medications:    Current Facility-Administered Medications   Medication Dose Route Frequency Provider Last Rate   • acetaminophen  650 mg Oral Q6H PRN Shania Cho, PA-C     • acetaminophen  650 mg Oral Q4H PRN Shania Cho, PA-C     • acetaminophen  975 mg Oral Q6H PRN Shania Cho, PA-C     • aluminum-magnesium hydroxide-simethicone  30 mL Oral Q4H PRN Shania Cho, PA-C     • artificial tear   Both Eyes 4x Daily PRN Shania Cho, PA-C     • haloperidol lactate  2.5 mg Intramuscular Q6H PRN Max 4/day Shania Cho, PA-C      And   • LORazepam  1 mg Intramuscular Q6H PRN Max 4/day Uzma Gould PA-C      And   • benztropine  0.5 mg Intramuscular Q6H PRN Max 4/day RENETTA Bowman-C     • benztropine  1 mg Intramuscular BID PRN RENETTA Bowman-C     • haloperidol lactate  5 mg Intramuscular Q4H PRN Max 4/day VERONICA BowmanC      And   • LORazepam  2 mg Intramuscular Q4H PRN Max 4/day Uzma Gould PA-C      And   • benztropine  1 mg Intramuscular Q4H PRN Max 4/day RENETTA Bowman-C     • benztropine  1 mg Oral BID PRN RENETTA Bowman-C     • bisacodyl  10 mg Rectal Daily PRN RENETTA Bowman-C     • hydrOXYzine HCL  50 mg Oral Q6H PRN Max 4/day Uzma Gould PA-C      Or   • diphenhydrAMINE  50 mg Intramuscular Q6H PRN Uzma Gould PA-C     • escitalopram  10 mg Oral Daily Sivan Allen, DO     • haloperidol  10 mg Oral BID Sivan Allen, DO     • haloperidol  2 mg Oral Q4H PRN Max 6/day RENETTA Bowman-C     • haloperidol  5 mg Oral Q6H PRN Max 4/day RENETTA Bowman-C     • haloperidol  5 mg Oral Q4H PRN Max 4/day VERONICA BowmanC     • hydrOXYzine HCL  100 mg Oral Q6H PRN Max 4/day RENETTA Bowman-AALIYAH      Or   • LORazepam  2 mg Intramuscular Q6H PRN RENETTA Bowman-C     • hydrOXYzine HCL  25 mg Oral Q6H PRN Max 4/day RENETTA Bowman-C     • magnesium hydroxide  30 mL Oral Daily PRN RENETTA Bowman-C     • mirtazapine  15 mg Oral HS Sivan Allen, DO     • nicotine  1 patch Transdermal Daily VERONICA BowmanC     • nicotine polacrilex  4 mg Oral Q2H PRN VERONICA BowmanC     • senna-docusate sodium  1 tablet Oral Daily PRN RENETTA Bowman-C     • traZODone  50 mg Oral HS PRN RENETTA Bowman-C         Allergies:      Allergies   Allergen Reactions   • Pollen Extract Sneezing       Objective     Vital signs in last 24 hours:    Temp:  [98.6 °F (37 °C)] 98.6 °F (37 °C)  HR:  [77] 77  Resp:  [17] 17  BP: (145)/(87) 145/87    No intake or output data in the 24 hours ending 07/07/23 [place]:     Orlando Meas was admitted to the inpatient psychiatric unit on 6/28/2023. During their hospitalization, Orlando Mesa was encouraged to attend groups and interact appropriately and positively with others in the milieu. Orlando Mesa was started on the following psychiatric medications: Haldol 10mg PO BID, Lexapro 10mg PO QD, Remeron 15mg PO HS. These medications were titrated up to a therapeutic range as evidenced by a reduction in Alberto's reported psychiatric symptomatology. There were no side effects noted or reported throughout Alberto's psychiatric hospitalization. At the time of discharge, there were no criteria present through which Orlando Mesa could be kept involuntarily for further psychiatric hospitalization. An outpatient discharge/follow up plan was discussed and coordinated between Orlando Mesa, this writer, and case management team.    Specific discharge disposition: Home. Patient is applying for [place] MA, but first needs to cancel his [place] MA.    Mental Status at Time of Discharge:     Appearance: casually dressed, appears consistent with stated age  Motor: no psychomotor disturbances, no gait abnormalities  Behavior: cooperative, interacts with this writer appropriately  Speech: normal rate, rhythm, and volume  Mood: "better"  Affect: euthymic, normal range and intensity  Thought Process: organized, linear, and goal-oriented  Thought Content: denies auditory or visual hallucinations  Perception: denies delusions or other perceptual disturbances  Risk Potential: denies suicidal ideation, plan, or intent.  Denies homicidal ideation  Sensorium: Oriented to person, place, time, and situation  Cognition: cognitive ability appears intact but was not quantitatively tested  Consciousness: alert and awake  Attention: able to focus without difficulty  Insight: improved  Judgement: improved       Suicide/Homicide Risk Assessment:    Risk of Harm to Self:   • Patient denied suicidal thoughts, intent, plan, or acts of furtherance at the time of discharge. Risk of Harm to Others:  • Patient denied homicidal thoughts or intent at the time of discharge      Admission Diagnosis:    Principal Problem:    Schizophrenia ([address])  Active Problems:    GERD (gastroesophageal reflux disease)    Medical clearance for psychiatric admission    Tobacco abuse      Discharge Diagnosis:     Principal Problem:    Schizophrenia ([address])  Active Problems:    GERD (gastroesophageal reflux disease)    Medical clearance for psychiatric admission    Tobacco abuse  Resolved Problems:    * No resolved hospital problems. *      Laboratory Results: I have personally reviewed all pertinent lab results. No results found for this or any previous visit (from the past 48 hour(s)). Discharge Medications:    See after visit summary for all reconciled discharge medications provided to patient and family. Current Discharge Medication List      START taking these medications    Details   escitalopram (LEXAPRO) 10 mg tablet Take 1 tablet (10 mg total) by mouth daily  Qty: 30 tablet, Refills: 0    Associated Diagnoses: Schizophrenia, unspecified type (HCC)      haloperidol (HALDOL) 10 mg tablet Take 1 tablet (10 mg total) by mouth 2 (two) times a day  Qty: 60 tablet, Refills: 0    Associated Diagnoses: Schizophrenia, unspecified type (HCC)      mirtazapine (REMERON) 15 mg tablet Take 1 tablet (15 mg total) by mouth daily at bedtime  Qty: 30 tablet, Refills: 0    Associated Diagnoses: Schizophrenia, unspecified type ([address])            Current Discharge Medication List      STOP taking these medications       pantoprazole (PROTONIX) 40 mg tablet Comments:   Reason for Stopping:              Current Discharge Medication List         Current Discharge Medication List           Discharge instructions/Information to patient and family:     See after visit summary for information provided to patient and family.       Provisions for Follow-Up Care:    See after visit summary for information related to follow-up care and any pertinent home health orders. Discharge Statement:    I spent 36 minutes discharging the patient. This time was spent on the day of discharge. I had direct contact with the patient on the day of discharge. Additional documentation is required if more than 30 minutes were spent on discharge:    · I had face-to-face contact with the patient and discussed discharge treatment plan  · I reviewed the importance of compliance with medications and outpatient treatment after discharge with the patient. · I discussed discharge and treatment plan with the patient, nursing, and case management/social work. · I discussed the medication regimen and possible side effects of the medications with the patient prior to discharge. At the time of discharge they were tolerating psychiatric medications. · I discussed outpatient follow up with the patient as per treatment plan / aftercare summary. · I reviewed elements of the aftercare plan with the patient. I discussed that if symptoms worsen or reoccur, that the patient can return to the emergency room or dial 911 in an emergency. · I reviewed the patient's hospital course and current psychiatric disease status. As of today, they are now at goal. They are psychiatrically stable for discharge home. The combination of active psychopharmacological medication management, interdisciplinary coordination with case management, and adjunctive milieu and group therapy to augment psychopharmacological efficacy appears to have been successful. The patient's risk of morbidity, and progression or decompensation of psychiatric disease, is lower today as compared to the day of admission.       Paola Ignacio DO 07/07/23

## 2023-07-07 NOTE — PLAN OF CARE
Problem: DEPRESSION  Goal: Will be euthymic at discharge  Description: INTERVENTIONS:  - Administer medication as ordered  - Provide emotional support via 1:1 interaction with staff  - Encourage involvement in milieu/groups/activities  - Monitor for social isolation  Outcome: Adequate for Discharge     Problem: PSYCHOSIS  Goal: Will report no hallucinations or delusions  Description: Interventions:  - Administer medication as  ordered  - Every waking shifts and PRN assess for the presence of hallucinations and or delusions  - Assist with reality testing to support increasing orientation  - Assess if patient's hallucinations or delusions are encouraging self-harm or harm to others and intervene as appropriate  Outcome: Adequate for Discharge     Problem: ANXIETY  Goal: Will report anxiety at manageable levels  Description: INTERVENTIONS:  - Administer medication as ordered  - Teach and encourage coping skills  - Provide emotional support  - Assess patient/family for anxiety and ability to cope  Outcome: Adequate for Discharge  Goal: By discharge: Patient will verbalize 2 strategies to deal with anxiety  Description: Interventions:  - Identify any obvious source/trigger to anxiety  - Staff will assist patient in applying identified coping technique/skills  - Encourage attendance of scheduled groups and activities  Outcome: Adequate for Discharge     Problem: DISCHARGE PLANNING  Goal: Discharge to home or other facility with appropriate resources  Description: INTERVENTIONS:  - Identify barriers to discharge w/patient and caregiver  - Arrange for needed discharge resources and transportation as appropriate  - Identify discharge learning needs (meds, wound care, etc.)  - Arrange for interpretive services to assist at discharge as needed  - Refer to Case Management Department for coordinating discharge planning if the patient needs post-hospital services based on physician/advanced practitioner order or complex needs related to functional status, cognitive ability, or social support system  Outcome: Adequate for Discharge     Problem: Ineffective Coping  Goal: Participates in unit activities  Description: Interventions:  - Provide therapeutic environment   - Provide required programming   - Redirect inappropriate behaviors   Outcome: Adequate for Discharge

## 2023-07-10 ENCOUNTER — HOSPITAL ENCOUNTER (EMERGENCY)
Facility: HOSPITAL | Age: 28
End: 2023-07-11
Attending: EMERGENCY MEDICINE | Admitting: EMERGENCY MEDICINE
Payer: COMMERCIAL

## 2023-07-10 DIAGNOSIS — F20.9 SCHIZOPHRENIA (HCC): Primary | ICD-10-CM

## 2023-07-10 DIAGNOSIS — Z00.8 MEDICAL CLEARANCE FOR PSYCHIATRIC ADMISSION: ICD-10-CM

## 2023-07-10 LAB — ETHANOL EXG-MCNC: 0 MG/DL

## 2023-07-10 PROCEDURE — 82075 ASSAY OF BREATH ETHANOL: CPT | Performed by: EMERGENCY MEDICINE

## 2023-07-10 PROCEDURE — 99284 EMERGENCY DEPT VISIT MOD MDM: CPT | Performed by: EMERGENCY MEDICINE

## 2023-07-10 NOTE — LETTER
[facility] [address]  Dept: [phone]      [facility] TRANSFER CONSENT    NAME Chito Dominguez                                         1995                              MRN [id number]    I have been informed of my rights regarding examination, treatment, and transfer   by Dr. Raina Vogt MD    Benefits: Continuity of care    Risks: Potential for delay in receiving treatment      Consent for Transfer:  I acknowledge that my medical condition has been evaluated and explained to me by the emergency department physician or other qualified medical person and/or my attending physician, who has recommended that I be transferred to the service of  Accepting Physician: Dr Ángel Ceja at [address] Name, [address] : [facility]. The above potential benefits of such transfer, the potential risks associated with such transfer, and the probable risks of not being transferred have been explained to me, and I fully understand them. The doctor has explained that, in my case, the benefits of transfer outweigh the risks. I agree to be transferred. I authorize the performance of emergency medical procedures and treatments upon me in both transit and upon arrival at the receiving facility. Additionally, I authorize the release of any and all medical records to the receiving facility and request they be transported with me, if possible. I understand that the safest mode of transportation during a medical emergency is an ambulance and that the Hospital advocates the use of this mode of transport. Risks of traveling to the receiving facility by car, including absence of medical control, life sustaining equipment, such as oxygen, and medical personnel has been explained to me and I fully understand them. (NATALAY CORRECT BOX BELOW)  Charlotte Gregorio  I consent to the stated transfer and to be transported by ambulance/helicopter.   [  ]  I consent to the stated transfer, but refuse transportation by ambulance and accept full responsibility for my transportation by car. I understand the risks of non-ambulance transfers and I exonerate the Hospital and its staff from any deterioration in my condition that results from this refusal.    X___________________________________________    DATE  23  TIME________  Signature of patient or legally responsible individual signing on patient behalf           RELATIONSHIP TO PATIENT_____Self____________________                              Provider [address] [place] [address]                                         1995                              MRN [id number]    A medical screening exam was performed on the above named patient. Based on the examination:    Condition Necessitating Transfer The primary encounter diagnosis was Schizophrenia ([address] A diagnosis of Medical clearance for psychiatric admission was also pertinent to this visit. Patient Condition: The patient has been stabilized such that within reasonable medical probability, no material deterioration of the patient condition or the condition of the unborn child(juan manuel) is likely to result from the transfer    Reason for Transfer: Level of Care needed not available at this facility    Transfer Requirements: Facility [facility] available and qualified personnel available for treatment as acknowledged by Arjun Telles, CIS ll @ [phone]  Agreed to accept transfer and to provide appropriate medical treatment as acknowledged by       Dr Pavel Montes  Appropriate medical records of the examination and treatment of the patient are provided at the time of transfer   [address] _A. A.______  Transfer will be performed by qualified personnel from [address]  and appropriate transfer equipment as required, including the use of necessary and appropriate life support measures.     Provider Certification: I have examined the patient and explained the following risks and benefits of being transferred/refusing transfer to the patient/family:  General risk, such as traffic hazards, adverse weather conditions, rough terrain or turbulence, possible failure of equipment (including vehicle or aircraft), or consequences of actions of persons outside the control of the transport personnel      Based on these reasonable risks and benefits to the patient and/or the unborn child(juan manuel), and based upon the information available at the time of the patient’s examination, I certify that the medical benefits reasonably to be expected from the provision of appropriate medical treatments at another medical facility outweigh the increasing risks, if any, to the individual’s medical condition, and in the case of labor to the unborn child, from effecting the transfer.     X____________________________________________ DATE 07/11/23        TIME_______      ORIGINAL - SEND TO MEDICAL RECORDS   COPY - SEND WITH PATIENT DURING TRANSFER

## 2023-07-11 ENCOUNTER — HOSPITAL ENCOUNTER (INPATIENT)
Facility: HOSPITAL | Age: 28
LOS: 35 days | Discharge: HOME | DRG: 750 | End: 2023-08-15
Attending: STUDENT IN AN ORGANIZED HEALTH CARE EDUCATION/TRAINING PROGRAM | Admitting: PSYCHIATRY & NEUROLOGY
Payer: COMMERCIAL

## 2023-07-11 VITALS
WEIGHT: 200 LBS | SYSTOLIC BLOOD PRESSURE: 129 MMHG | DIASTOLIC BLOOD PRESSURE: 64 MMHG | RESPIRATION RATE: 17 BRPM | HEIGHT: 67 IN | OXYGEN SATURATION: 97 % | TEMPERATURE: 98.1 F | HEART RATE: 90 BPM | BODY MASS INDEX: 31.39 KG/M2

## 2023-07-11 DIAGNOSIS — Z00.8 MEDICAL CLEARANCE FOR PSYCHIATRIC ADMISSION: ICD-10-CM

## 2023-07-11 DIAGNOSIS — F20.9 SCHIZOPHRENIA, UNSPECIFIED TYPE (HCC): ICD-10-CM

## 2023-07-11 DIAGNOSIS — F20.9 SCHIZOPHRENIA (HCC): Primary | ICD-10-CM

## 2023-07-11 LAB
AMPHETAMINES SERPL QL SCN: NEGATIVE
BARBITURATES UR QL: NEGATIVE
BENZODIAZ UR QL: NEGATIVE
COCAINE UR QL: NEGATIVE
METHADONE UR QL: NEGATIVE
OPIATES UR QL SCN: NEGATIVE
OXYCODONE+OXYMORPHONE UR QL SCN: NEGATIVE
PCP UR QL: NEGATIVE
THC UR QL: NEGATIVE

## 2023-07-11 PROCEDURE — G0425 INPT/ED TELECONSULT30: HCPCS | Performed by: PSYCHIATRY & NEUROLOGY

## 2023-07-11 PROCEDURE — 80307 DRUG TEST PRSMV CHEM ANLYZR: CPT | Performed by: EMERGENCY MEDICINE

## 2023-07-11 RX ORDER — HYDROXYZINE 50 MG/1
50 TABLET, FILM COATED ORAL
Status: DISCONTINUED | OUTPATIENT
Start: 2023-07-11 | End: 2023-08-15 | Stop reason: HOSPADM

## 2023-07-11 RX ORDER — ACETAMINOPHEN 325 MG/1
650 TABLET ORAL EVERY 6 HOURS PRN
Status: DISCONTINUED | OUTPATIENT
Start: 2023-07-11 | End: 2023-08-15 | Stop reason: HOSPADM

## 2023-07-11 RX ORDER — ESCITALOPRAM OXALATE 10 MG/1
10 TABLET ORAL DAILY
Status: CANCELLED | OUTPATIENT
Start: 2023-07-12

## 2023-07-11 RX ORDER — LORAZEPAM 2 MG/ML
2 INJECTION INTRAMUSCULAR EVERY 6 HOURS PRN
Status: CANCELLED | OUTPATIENT
Start: 2023-07-11

## 2023-07-11 RX ORDER — TRAZODONE HYDROCHLORIDE 50 MG/1
50 TABLET ORAL
Status: DISCONTINUED | OUTPATIENT
Start: 2023-07-11 | End: 2023-08-07

## 2023-07-11 RX ORDER — HALOPERIDOL 5 MG/1
2.5 TABLET ORAL
Status: DISCONTINUED | OUTPATIENT
Start: 2023-07-11 | End: 2023-08-15 | Stop reason: HOSPADM

## 2023-07-11 RX ORDER — LANOLIN ALCOHOL/MO/W.PET/CERES
3 CREAM (GRAM) TOPICAL
Status: CANCELLED | OUTPATIENT
Start: 2023-07-11

## 2023-07-11 RX ORDER — ESCITALOPRAM OXALATE 10 MG/1
10 TABLET ORAL DAILY
Status: DISCONTINUED | OUTPATIENT
Start: 2023-07-12 | End: 2023-08-15 | Stop reason: HOSPADM

## 2023-07-11 RX ORDER — HALOPERIDOL 5 MG/1
5 TABLET ORAL
Status: CANCELLED | OUTPATIENT
Start: 2023-07-11

## 2023-07-11 RX ORDER — MIRTAZAPINE 15 MG/1
15 TABLET, FILM COATED ORAL
Status: DISCONTINUED | OUTPATIENT
Start: 2023-07-11 | End: 2023-07-11 | Stop reason: HOSPADM

## 2023-07-11 RX ORDER — LORAZEPAM 2 MG/ML
2 INJECTION INTRAMUSCULAR
Status: CANCELLED | OUTPATIENT
Start: 2023-07-11

## 2023-07-11 RX ORDER — TRAZODONE HYDROCHLORIDE 50 MG/1
50 TABLET ORAL
Status: CANCELLED | OUTPATIENT
Start: 2023-07-11

## 2023-07-11 RX ORDER — HALOPERIDOL 5 MG/1
2.5 TABLET ORAL
Status: CANCELLED | OUTPATIENT
Start: 2023-07-11

## 2023-07-11 RX ORDER — LANOLIN ALCOHOL/MO/W.PET/CERES
3 CREAM (GRAM) TOPICAL
Status: DISCONTINUED | OUTPATIENT
Start: 2023-07-11 | End: 2023-07-12

## 2023-07-11 RX ORDER — HALOPERIDOL 5 MG/ML
5 INJECTION INTRAMUSCULAR
Status: CANCELLED | OUTPATIENT
Start: 2023-07-11

## 2023-07-11 RX ORDER — BENZTROPINE MESYLATE 1 MG/1
1 TABLET ORAL
Status: DISCONTINUED | OUTPATIENT
Start: 2023-07-11 | End: 2023-08-15 | Stop reason: HOSPADM

## 2023-07-11 RX ORDER — HALOPERIDOL 1 MG/1
1 TABLET ORAL EVERY 6 HOURS PRN
Status: CANCELLED | OUTPATIENT
Start: 2023-07-11

## 2023-07-11 RX ORDER — HALOPERIDOL 5 MG/1
10 TABLET ORAL 2 TIMES DAILY
Status: DISCONTINUED | OUTPATIENT
Start: 2023-07-11 | End: 2023-07-11 | Stop reason: HOSPADM

## 2023-07-11 RX ORDER — MIRTAZAPINE 15 MG/1
15 TABLET, FILM COATED ORAL
Status: CANCELLED | OUTPATIENT
Start: 2023-07-11

## 2023-07-11 RX ORDER — LORAZEPAM 2 MG/ML
2 INJECTION INTRAMUSCULAR
Status: DISCONTINUED | OUTPATIENT
Start: 2023-07-11 | End: 2023-08-15 | Stop reason: HOSPADM

## 2023-07-11 RX ORDER — DIPHENHYDRAMINE HYDROCHLORIDE 50 MG/ML
50 INJECTION INTRAMUSCULAR; INTRAVENOUS EVERY 6 HOURS PRN
Status: DISCONTINUED | OUTPATIENT
Start: 2023-07-11 | End: 2023-08-15 | Stop reason: HOSPADM

## 2023-07-11 RX ORDER — HALOPERIDOL 5 MG/ML
2.5 INJECTION INTRAMUSCULAR
Status: DISCONTINUED | OUTPATIENT
Start: 2023-07-11 | End: 2023-08-15 | Stop reason: HOSPADM

## 2023-07-11 RX ORDER — ACETAMINOPHEN 325 MG/1
650 TABLET ORAL EVERY 6 HOURS PRN
Status: CANCELLED | OUTPATIENT
Start: 2023-07-11

## 2023-07-11 RX ORDER — LORAZEPAM 2 MG/ML
2 INJECTION INTRAMUSCULAR EVERY 6 HOURS PRN
Status: DISCONTINUED | OUTPATIENT
Start: 2023-07-11 | End: 2023-08-15 | Stop reason: HOSPADM

## 2023-07-11 RX ORDER — BISACODYL 10 MG
10 SUPPOSITORY, RECTAL RECTAL DAILY PRN
Status: CANCELLED | OUTPATIENT
Start: 2023-07-11

## 2023-07-11 RX ORDER — BENZTROPINE MESYLATE 1 MG/ML
1 INJECTION INTRAMUSCULAR; INTRAVENOUS
Status: DISCONTINUED | OUTPATIENT
Start: 2023-07-11 | End: 2023-08-15 | Stop reason: HOSPADM

## 2023-07-11 RX ORDER — MAGNESIUM HYDROXIDE/ALUMINUM HYDROXICE/SIMETHICONE 120; 1200; 1200 MG/30ML; MG/30ML; MG/30ML
30 SUSPENSION ORAL EVERY 4 HOURS PRN
Status: CANCELLED | OUTPATIENT
Start: 2023-07-11

## 2023-07-11 RX ORDER — HALOPERIDOL 5 MG/1
10 TABLET ORAL 2 TIMES DAILY
Status: CANCELLED | OUTPATIENT
Start: 2023-07-11

## 2023-07-11 RX ORDER — HALOPERIDOL 1 MG/1
1 TABLET ORAL EVERY 6 HOURS PRN
Status: DISCONTINUED | OUTPATIENT
Start: 2023-07-11 | End: 2023-08-15 | Stop reason: HOSPADM

## 2023-07-11 RX ORDER — HALOPERIDOL 5 MG/ML
2.5 INJECTION INTRAMUSCULAR
Status: CANCELLED | OUTPATIENT
Start: 2023-07-11

## 2023-07-11 RX ORDER — HYDROXYZINE 50 MG/1
100 TABLET, FILM COATED ORAL
Status: DISCONTINUED | OUTPATIENT
Start: 2023-07-11 | End: 2023-08-15 | Stop reason: HOSPADM

## 2023-07-11 RX ORDER — AMOXICILLIN 250 MG
1 CAPSULE ORAL DAILY PRN
Status: DISCONTINUED | OUTPATIENT
Start: 2023-07-11 | End: 2023-08-15 | Stop reason: HOSPADM

## 2023-07-11 RX ORDER — BENZTROPINE MESYLATE 1 MG/ML
1 INJECTION INTRAMUSCULAR; INTRAVENOUS
Status: CANCELLED | OUTPATIENT
Start: 2023-07-11

## 2023-07-11 RX ORDER — BENZTROPINE MESYLATE 1 MG/ML
0.5 INJECTION INTRAMUSCULAR; INTRAVENOUS
Status: CANCELLED | OUTPATIENT
Start: 2023-07-11

## 2023-07-11 RX ORDER — ACETAMINOPHEN 325 MG/1
650 TABLET ORAL EVERY 4 HOURS PRN
Status: CANCELLED | OUTPATIENT
Start: 2023-07-11

## 2023-07-11 RX ORDER — BENZTROPINE MESYLATE 1 MG/1
1 TABLET ORAL
Status: CANCELLED | OUTPATIENT
Start: 2023-07-11

## 2023-07-11 RX ORDER — POLYETHYLENE GLYCOL 3350 17 G/17G
17 POWDER, FOR SOLUTION ORAL DAILY PRN
Status: DISCONTINUED | OUTPATIENT
Start: 2023-07-11 | End: 2023-08-15 | Stop reason: HOSPADM

## 2023-07-11 RX ORDER — HYDROXYZINE HYDROCHLORIDE 25 MG/1
100 TABLET, FILM COATED ORAL
Status: CANCELLED | OUTPATIENT
Start: 2023-07-11

## 2023-07-11 RX ORDER — ESCITALOPRAM OXALATE 10 MG/1
10 TABLET ORAL DAILY
Status: DISCONTINUED | OUTPATIENT
Start: 2023-07-11 | End: 2023-07-11 | Stop reason: HOSPADM

## 2023-07-11 RX ORDER — DIPHENHYDRAMINE HYDROCHLORIDE 50 MG/ML
50 INJECTION INTRAMUSCULAR; INTRAVENOUS EVERY 6 HOURS PRN
Status: CANCELLED | OUTPATIENT
Start: 2023-07-11

## 2023-07-11 RX ORDER — HALOPERIDOL 10 MG/1
10 TABLET ORAL 2 TIMES DAILY
Status: DISCONTINUED | OUTPATIENT
Start: 2023-07-12 | End: 2023-08-15 | Stop reason: HOSPADM

## 2023-07-11 RX ORDER — HYDROXYZINE HYDROCHLORIDE 25 MG/1
25 TABLET, FILM COATED ORAL
Status: CANCELLED | OUTPATIENT
Start: 2023-07-11

## 2023-07-11 RX ORDER — BISACODYL 10 MG
10 SUPPOSITORY, RECTAL RECTAL DAILY PRN
Status: DISCONTINUED | OUTPATIENT
Start: 2023-07-11 | End: 2023-08-15 | Stop reason: HOSPADM

## 2023-07-11 RX ORDER — ACETAMINOPHEN 325 MG/1
650 TABLET ORAL EVERY 4 HOURS PRN
Status: DISCONTINUED | OUTPATIENT
Start: 2023-07-11 | End: 2023-08-15 | Stop reason: HOSPADM

## 2023-07-11 RX ORDER — LORAZEPAM 2 MG/ML
1 INJECTION INTRAMUSCULAR
Status: CANCELLED | OUTPATIENT
Start: 2023-07-11

## 2023-07-11 RX ORDER — ACETAMINOPHEN 325 MG/1
975 TABLET ORAL EVERY 6 HOURS PRN
Status: CANCELLED | OUTPATIENT
Start: 2023-07-11

## 2023-07-11 RX ORDER — HYDROXYZINE HYDROCHLORIDE 25 MG/1
50 TABLET, FILM COATED ORAL
Status: CANCELLED | OUTPATIENT
Start: 2023-07-11

## 2023-07-11 RX ORDER — LORAZEPAM 2 MG/ML
1 INJECTION INTRAMUSCULAR
Status: DISCONTINUED | OUTPATIENT
Start: 2023-07-11 | End: 2023-08-15 | Stop reason: HOSPADM

## 2023-07-11 RX ORDER — AMOXICILLIN 250 MG
1 CAPSULE ORAL DAILY PRN
Status: CANCELLED | OUTPATIENT
Start: 2023-07-11

## 2023-07-11 RX ORDER — BENZTROPINE MESYLATE 1 MG/ML
0.5 INJECTION INTRAMUSCULAR; INTRAVENOUS
Status: DISCONTINUED | OUTPATIENT
Start: 2023-07-11 | End: 2023-08-15 | Stop reason: HOSPADM

## 2023-07-11 RX ORDER — HYDROXYZINE HYDROCHLORIDE 25 MG/1
25 TABLET, FILM COATED ORAL
Status: DISCONTINUED | OUTPATIENT
Start: 2023-07-11 | End: 2023-08-15 | Stop reason: HOSPADM

## 2023-07-11 RX ORDER — MAGNESIUM HYDROXIDE/ALUMINUM HYDROXICE/SIMETHICONE 120; 1200; 1200 MG/30ML; MG/30ML; MG/30ML
30 SUSPENSION ORAL EVERY 4 HOURS PRN
Status: DISCONTINUED | OUTPATIENT
Start: 2023-07-11 | End: 2023-08-15 | Stop reason: HOSPADM

## 2023-07-11 RX ORDER — ACETAMINOPHEN 325 MG/1
975 TABLET ORAL EVERY 6 HOURS PRN
Status: DISCONTINUED | OUTPATIENT
Start: 2023-07-11 | End: 2023-08-15 | Stop reason: HOSPADM

## 2023-07-11 RX ORDER — HALOPERIDOL 5 MG/ML
5 INJECTION INTRAMUSCULAR
Status: DISCONTINUED | OUTPATIENT
Start: 2023-07-11 | End: 2023-08-15 | Stop reason: HOSPADM

## 2023-07-11 RX ORDER — HALOPERIDOL 10 MG/1
10 TABLET ORAL 2 TIMES DAILY
Status: DISCONTINUED | OUTPATIENT
Start: 2023-07-11 | End: 2023-07-11

## 2023-07-11 RX ORDER — MIRTAZAPINE 15 MG/1
15 TABLET, FILM COATED ORAL
Status: DISCONTINUED | OUTPATIENT
Start: 2023-07-11 | End: 2023-07-18

## 2023-07-11 RX ORDER — POLYETHYLENE GLYCOL 3350 17 G/17G
17 POWDER, FOR SOLUTION ORAL DAILY PRN
Status: CANCELLED | OUTPATIENT
Start: 2023-07-11

## 2023-07-11 RX ORDER — HALOPERIDOL 5 MG/1
5 TABLET ORAL
Status: DISCONTINUED | OUTPATIENT
Start: 2023-07-11 | End: 2023-08-15 | Stop reason: HOSPADM

## 2023-07-11 RX ADMIN — HALOPERIDOL 10 MG: 5 TABLET ORAL at 17:18

## 2023-07-11 RX ADMIN — ESCITALOPRAM OXALATE 10 MG: 10 TABLET ORAL at 09:28

## 2023-07-11 RX ADMIN — MELATONIN 3 MG: at 22:15

## 2023-07-11 RX ADMIN — HALOPERIDOL 10 MG: 5 TABLET ORAL at 09:27

## 2023-07-11 RX ADMIN — TRAZODONE HYDROCHLORIDE 50 MG: 50 TABLET ORAL at 22:15

## 2023-07-11 RX ADMIN — MIRTAZAPINE 15 MG: 15 TABLET, FILM COATED ORAL at 22:16

## 2023-07-11 NOTE — ED NOTES
Both pt and Dr Debo Mayorga signed the [phone] document. CW to fax [phone] and face sheet to Vishal JACKMAN of [employer] for review In-network.     ZACARIAS Pal, CIS ll  07/11/23 17:25

## 2023-07-11 NOTE — TELEMEDICINE
TeleConsultation - [facility] 33 y.o. male MRN: [id number]  Unit/Bed#: 1161 [address] 02 Encounter: [id number]        REQUIRED DOCUMENTATION:     1. This service was provided via Telemedicine. 2. Provider located at [facility].  3. TeleMed provider: Karan Mccall MD.  4. Identify all parties in room with patient during tele consult:  pt  5. Patient was then informed that this was a Telemedicine visit and that the exam was being conducted confidentially over secure lines. My office door was closed. No one else was in the room. Patient acknowledged consent and understanding of privacy and security of the Telemedicine visit, and gave us permission to have the assistant stay in the room in order to assist with the history and to conduct the exam.  I informed the patient that I have reviewed their record in Epic and presented the opportunity for them to ask any questions regarding the visit today. The patient agreed to participate. Assessment/Plan     Present on Admission:  **None**    Assessment:    Schizophrenia with acute exacerbation    Treatment Plan:    Given the suicidal ideation and level of depression and hopelessness with auditory hallucinations, inpatient psychiatric treatment is indicated for provision of precautions, further diagnostic evaluation and treatment stabilization. The patient is in agreement with this plan and is appropriate for 201 voluntary paperwork. In the meantime recommend continuing the patient's home psychiatric medications after confirming them. He states they consist of Haldol 10 mg p.o. twice daily, Lexapro 10 mg p.o. daily and Remeron 15 mg p.o. nightly. In addition recommend Zyprexa 5 mg p.o. now then daily as mood stabilizer and for the psychotic features failing to respond to Haldol. May consider additional Zyprexa 5 to 10 mg p.o. or IM every 6 hours as needed agitation/psychosis. Total Zyprexa should not exceed 30 mg per 24 hours.   Continual observation suicide precautions are indicated. The patient is in agreement this plan and gives his informed consent for the medications. Current Medications:     Current Facility-Administered Medications   Medication Dose Route Frequency Provider Last Rate   • escitalopram  10 mg Oral Daily Haresh Maurice MD     • haloperidol  10 mg Oral BID Haresh Maurice MD     • mirtazapine  15 mg Oral HS Haresh Maurice MD         Risks / Benefits of Treatment:    Risks, benefits, and possible side effects of medications explained to patient and patient verbalizes understanding. Other treatment modalities recommended as indicated:    · Inpatient psychiatric treatment      Consult to Psychiatry  Consult performed by: Garrick May MD  Consult ordered by: Josse Ji DO        Physician Requesting Consult: Gabriel Chapa MD  Principal Problem:<principal problem not specified>    Reason for Consult: Schizophrenia; auditory hallucination; suicidal ideation      History of Present Illness      Patient is a 33 y.o. male who presents to the emergency department for crisis obtained and documented the following information:   Pt presents to the ED as a self referral from home. Pt is calm, cooperative and maintains good eye contact. Pt confirms having been recently discharged from [facility] on 7/7/23. Pt denies SI's / HI's and or visual hallucinations. Pt reports experiencing non-commanding auditory hallucinations. Pt states, "I felt good when I left the hospital and they did not start again until two days later." Pt reports being compliant w/medications. Pt reports OP therapy appointment is too far from his home and he needs to call a psychiatrist to schedule an appointment. Pt denies any issues w/sleep and or appetite at this time. Pt denies hx of SA'a and SIB's. Pt reports having [place] and recently applied [place]. Pt denies any medical issues, legal issues, use of illegal substances and or ETOH.  Pt reports smoking aprox 5 cigarettes daily. CW and pt discussed pt participate in telepsych consult to inquire on medication adjustments if possible. Pt does report baseline symptoms of auditory hallucinations for quite some amount of time. In meeting with the patient, he confirms the above documentation with the exception that he does admit to suicidal ideation along with significant depression and feelings of hopelessness and helplessness. Past psychiatric history: Schizophrenia. As above the patient just recently discharged from inpatient psychiatric treatment. Social history: The patient is single and has no children. He states he is currently living with daughters. He reports no abuse. He states he draws disability. Family history: Unremarkable per patient    Substance use history: The patient admits to smoking about 5 cigarettes/day. He denies other substance use. Mental status examination: The patient is alert and well oriented in all spheres. Affect is very depressed and somewhat melancholic. He appears psychomotor retarded. Made good eye contact. Responses to questions were very brief. Speech was otherwise unremarkable. Sensorium is clear. Thought process was logical and linear. Thought content was reality based. Associations were tight. Memory appeared to be intact in all spheres. He appears to be of average intelligence/use vocabulary, general fund of knowledge, his instructions syntax. He admits to suicidal ideation. When asked about suicidal plan, after an uncomfortably prolonged pause he responded "I do not know". I am concerned he may have been reluctant to disclose a suicide plan. He denies homicidal ideation. He admits to the auditory hallucinations as above but denies command hallucinations. No other psychotic features are elicited at this time. I am concerned there may be some paranoid delusions.   Insight and judgment have become impaired by the extent of his depression, psychosis with suicidal ideation. He does have a recognize the need for inpatient psychiatric treatment for which he is motivated. Past Medical History:   Diagnosis Date   • Hallucination    • Schizophrenia [facility])        Medical Review Of Systems:    Review of Systems    Meds/Allergies     all current active meds have been reviewed  Allergies   Allergen Reactions   • Pollen Extract Sneezing       Objective     Vital signs in last 24 hours:  Temp:  [98 °F (36.7 °C)-98.1 °F (36.7 °C)] 98.1 °F (36.7 °C)  HR:  [80-98] 90  Resp:  [16-18] 17  BP: (121-144)/(64-86) 129/64    No intake or output data in the 24 hours ending 07/11/23 1647      Lab Results: I have personally reviewed all pertinent laboratory/tests results. Imaging Studies: No results found. EKG/Pathology/Other Studies:   Lab Results   Component Value Date    VENTRATE 75 06/28/2023    ATRIALRATE 75 06/28/2023    PRINT 144 06/28/2023    QRSDINT 98 06/28/2023    QTINT 366 06/28/2023    QTCINT 408 06/28/2023    PAXIS 56 06/28/2023    QRSAXIS 67 06/28/2023    TWAVEAXIS -26 06/28/2023        Code Status: Prior  Advance Directive and Living Will:      Power of :    POLST:      Screenings:    1. Nutrition Screening  · Not available on chart    2. Pain Screening  Not available on chart    3. Suicide Screening  Not available on chart    Counseling / Coordination of Care: Total floor / unit time spent today 30 minutes. Greater than 50% of total time was spent with the patient and / or family counseling and / or coordination of care. A description of the counseling / coordination of care: Chart review, patient evaluation, coordination communication with staff, nursing and provider.

## 2023-07-11 NOTE — ED PROVIDER NOTES
History  Chief Complaint   Patient presents with   • Psychiatric Evaluation     Pt reports he has a psych issue and would like to be admitted. Pt denies si and hi but reports he is hearing and seeing things for over 1 year. 33year-old male with a history of schizophrenia presents with a chief complaint of increasing auditory hallucinations after recent discharge from [facility]. Patient denies any command hallucinations but states the voices have been threatening to harm him. Patient denies any thoughts of self harm or harm to others. Patient states he has been compliant with his medications and denies any illicit substance use. Impression and plan: Differently with auditory hallucinations. Patient may benefit from inpatient psychiatric admission for involuntary admission though I do not believe patient meets criteria for involuntary admission to the hospital.  We will have crisis evaluate patient, monitor patient, reassess. I have ordered patient's psychiatric medications that I encouraged him to take. Prior to Admission Medications   Prescriptions Last Dose Informant Patient Reported? Taking?   escitalopram (LEXAPRO) 10 mg tablet   No No   Sig: Take 1 tablet (10 mg total) by mouth daily   haloperidol (HALDOL) 10 mg tablet   No No   Sig: Take 1 tablet (10 mg total) by mouth 2 (two) times a day   mirtazapine (REMERON) 15 mg tablet   No No   Sig: Take 1 tablet (15 mg total) by mouth daily at bedtime      Facility-Administered Medications: None       Past Medical History:   Diagnosis Date   • Hallucination    • Schizophrenia [facility])        Past Surgical History:   Procedure Laterality Date   • COLONOSCOPY         History reviewed. No pertinent family history. I have reviewed and agree with the history as documented.     E-Cigarette/Vaping   • E-Cigarette Use Current Some Day User      E-Cigarette/Vaping Substances   • Nicotine Yes      Social History     Tobacco Use   • Smoking status: Every Day     Packs/day: 0.25     Types: Cigarettes   • Smokeless tobacco: Never   Vaping Use   • Vaping Use: Some days   • Substances: Nicotine   Substance Use Topics   • Alcohol use: Yes     Comment: ocassioanlly   • Drug use: Not Currently       Review of Systems    Physical Exam  Physical Exam  Vitals reviewed. HENT:      Head: Atraumatic. Eyes:      General: No scleral icterus. Cardiovascular:      Rate and Rhythm: Normal rate. Abdominal:      General: There is no distension. Musculoskeletal:         General: No deformity. Cervical back: Neck supple. Skin:     General: Skin is warm and dry. Neurological:      General: No focal deficit present. Mental Status: He is alert. Psychiatric:         Mood and Affect: Affect is flat. Speech: Speech normal.         Behavior: Behavior is cooperative. Thought Content: Thought content does not include homicidal or suicidal ideation. Thought content does not include homicidal or suicidal plan.          Vital Signs  ED Triage Vitals [07/10/23 2226]   Temperature Pulse Respirations Blood Pressure SpO2   98 °F (36.7 °C) 98 18 144/86 100 %      Temp Source Heart Rate Source Patient Position - Orthostatic VS BP Location FiO2 (%)   Tympanic Monitor Sitting Left arm --      Pain Score       --           Vitals:    07/10/23 2226 07/11/23 0614 07/11/23 1103 07/11/23 1526   BP: 144/86 130/76 121/70 129/64   Pulse: 98 80 83 90   Patient Position - Orthostatic VS: Sitting  Sitting Sitting         Visual Acuity      ED Medications  Medications - No data to display    Diagnostic Studies  Results Reviewed     Procedure Component Value Units Date/Time    Rapid drug screen, urine [[id number]]  (Normal) Collected: 07/11/23 0921    Lab Status: Final result Specimen: Urine, Clean Catch Updated: 07/11/23 0956     Amph/Meth UR Negative     Barbiturate Ur Negative     Benzodiazepine Urine Negative     Cocaine Urine Negative     Methadone Urine Negative Opiate Urine Negative     PCP Ur Negative     THC Urine Negative     Oxycodone Urine Negative    Narrative:      FOR MEDICAL PURPOSES ONLY. IF CONFIRMATION NEEDED PLEASE CONTACT THE LAB WITHIN 5 DAYS. Drug Screen Cutoff Levels:  AMPHETAMINE/METHAMPHETAMINES  1000 ng/mL  BARBITURATES     200 ng/mL  BENZODIAZEPINES     200 ng/mL  COCAINE      300 ng/mL  METHADONE      300 ng/mL  OPIATES      300 ng/mL  PHENCYCLIDINE     25 ng/mL  THC       50 ng/mL  OXYCODONE      100 ng/mL    POCT alcohol breath test [[id number]]  (Normal) Resulted: 07/10/23 2319    Lab Status: Final result Updated: 07/10/23 2319     EXTBreath Alcohol 0.00                 No orders to display              Procedures  Procedures         ED Course                               SBIRT 20yo+    Flowsheet Row Most Recent Value   Initial Alcohol Screen: US AUDIT-C     1. How often do you have a drink containing alcohol? 1 Filed at: 07/11/2023 0935   2. How many drinks containing alcohol do you have on a typical day you are drinking? 0 Filed at: 07/11/2023 0935   3a. Male UNDER 65: How often do you have five or more drinks on one occasion? 0 Filed at: 07/11/2023 0935   3b. FEMALE Any Age, or MALE 65+: How often do you have 4 or more drinks on one occassion? 0 Filed at: 07/11/2023 0935   Audit-C Score 1 Filed at: 07/11/2023 3778   MARIELA: How many times in the past year have you. .. Used an illegal drug or used a prescription medication for non-medical reasons?  Never Filed at: 07/11/2023 0935                    MDM    Disposition  Final diagnoses:   Schizophrenia [facility])     Time reflects when diagnosis was documented in both MDM as applicable and the Disposition within this note     Time User Action Codes Description Comment    7/11/2023  4:31 AM Lila Chua Add [F20.9] Schizophrenia ([address])     7/11/2023  6:55 PM Jasson Allison Add [Z00.8] Medical clearance for psychiatric admission       ED Disposition     ED Disposition   Transfer to Behavioral Health    Condition   --    Date/Time   Tue Jul 11, 2023  8:22 PM    Comment   --         MD Documentation    Flowsheet Row Most Recent Value   Patient Condition The patient has been stabilized such that within reasonable medical probability, no material deterioration of the patient condition or the condition of the unborn child(juan manuel) is likely to result from the transfer   Reason for Transfer Level of Care needed not available at this facility   Benefits of Transfer Continuity of care   Risks of Transfer Potential for delay in receiving treatment   Accepting Physician Dr Amos Quintanilla Name, [facility]    (Name & Tel number) Abhinav Schuster, [facility] @ [phone]   Transported by [employer] and Unit #) [facility]   Sending MD Dr Aura Parmar   Provider Certification General risk, such as traffic hazards, adverse weather conditions, rough terrain or turbulence, possible failure of equipment (including vehicle or aircraft), or consequences of actions of persons outside the control of the transport personnel      RN Documentation    [address] Name, [facility]    (Name & Tel number) Abhinav Schuster, [facility] [address] [address] [place] [address] @ [phone]   Transported by [employer] and Unit #) [facility]   Level of Care Other (Comment)  [CTS]   Transfer Date 07/11/23   Transfer Time 2030      Follow-up Information    None         Discharge Medication List as of 7/11/2023  8:24 PM      CONTINUE these medications which have NOT CHANGED    Details   escitalopram (LEXAPRO) 10 mg tablet Take 1 tablet (10 mg total) by mouth daily, Starting Fri 7/7/2023, Until Sun 8/6/2023, Normal      haloperidol (HALDOL) 10 mg tablet Take 1 tablet (10 mg total) by mouth 2 (two) times a day, Starting Fri 7/7/2023, Until Sun 8/6/2023, Normal      mirtazapine (REMERON) 15 mg tablet Take 1 tablet (15 mg total) by mouth daily at bedtime, Starting Fri 7/7/2023, Until Sun 8/6/2023, Normal             No discharge procedures on file.     PDMP Review     None          ED Provider  Electronically Signed by           Veronica Wilburn MD  07/11/23 9249

## 2023-07-11 NOTE — ED NOTES
CW notes, pt appears to be sleeping soundly at this time. As per pt's chart, pt was recently d/c'd from [facility]. Madelyn Lay will assess pt once pt is awake and alert.     TDS, CW

## 2023-07-11 NOTE — ED NOTES
CW confirmed telepsych consults are in the queue    TDS, CW Patient wishes to decline Influenza vaccine today.     Prior to rooming process, patient verbally agrees to allow accompanying family/friends/caregivers into the exam room and to be present for discussion of patient's medical needs. Today patient presents with spouse, Virginia.

## 2023-07-11 NOTE — ED NOTES
CW advised by unit clerk, telepsych consult will now be completed through [address] [place] [address] and no longer through in network.      TDS, CW

## 2023-07-11 NOTE — ED NOTES
Pt presents to the ED as a self referral from home. Pt is calm, cooperative and maintains good eye contact. Pt confirms having been recently discharged from hospitals on 7/7/23. Pt denies SI's / HI's and or visual hallucinations. Pt reports experiencing non-commanding auditory hallucinations. Pt states, "I felt good when I left the hospital and they did not start again until two days later." Pt reports being compliant w/medications. Pt reports OP therapy appointment is too far from his home and he needs to call a psychiatrist to schedule an appointment. Pt denies any issues w/sleep and or appetite at this time. Pt denies hx of SA'a and SIB's. Pt reports having [place] and recently applied [place]. Pt denies any medical issues, legal issues, use of illegal substances and or ETOH. Pt reports smoking aprox 5 cigarettes daily. CW and pt discussed pt participate in telepsych consult to inquire on medication adjustments if possible. Pt does report baseline symptoms of auditory hallucinations for quite some amount of time. CW sent TT to Dr. Nancy Floyd requesting an order for telepsych consult.     TDS, CW

## 2023-07-11 NOTE — ED NOTES
Patient is accepted at [facility]. Patient is accepted by Dr. Chris Coffman per Rachid Power. Transportation is arranged with [facility] via [facility]. Transportation is scheduled for 20:30. Patient may go to the floor at 21:30.      CW informed Sarai JACKMAN of Intake of pt's ETA. Nurse report is to be called to [phone] prior to patient transfer.     ZACARIAS Estes, CIS ll  07/11/23 18:15

## 2023-07-12 PROBLEM — R94.31 T WAVE INVERSION IN EKG: Status: ACTIVE | Noted: 2023-07-12

## 2023-07-12 LAB
BACTERIA UR QL AUTO: NORMAL /HPF
BILIRUB UR QL STRIP: NEGATIVE
CLARITY UR: CLEAR
COLOR UR: YELLOW
GLUCOSE UR STRIP-MCNC: NEGATIVE MG/DL
HGB UR QL STRIP.AUTO: NEGATIVE
KETONES UR STRIP-MCNC: NEGATIVE MG/DL
LEUKOCYTE ESTERASE UR QL STRIP: NEGATIVE
NITRITE UR QL STRIP: NEGATIVE
NON-SQ EPI CELLS URNS QL MICRO: NORMAL /HPF
PH UR STRIP.AUTO: 6 [PH]
PROT UR STRIP-MCNC: NEGATIVE MG/DL
RBC #/AREA URNS AUTO: NORMAL /HPF
SP GR UR STRIP.AUTO: 1.02 (ref 1–1.03)
UROBILINOGEN UR STRIP-ACNC: <2 MG/DL
WBC #/AREA URNS AUTO: NORMAL /HPF

## 2023-07-12 PROCEDURE — 99223 1ST HOSP IP/OBS HIGH 75: CPT | Performed by: STUDENT IN AN ORGANIZED HEALTH CARE EDUCATION/TRAINING PROGRAM

## 2023-07-12 PROCEDURE — 81001 URINALYSIS AUTO W/SCOPE: CPT | Performed by: PSYCHIATRY & NEUROLOGY

## 2023-07-12 PROCEDURE — 99222 1ST HOSP IP/OBS MODERATE 55: CPT | Performed by: PHYSICIAN ASSISTANT

## 2023-07-12 RX ORDER — OLANZAPINE 5 MG/1
5 TABLET ORAL
Status: DISCONTINUED | OUTPATIENT
Start: 2023-07-12 | End: 2023-07-14

## 2023-07-12 RX ADMIN — OLANZAPINE 5 MG: 5 TABLET, FILM COATED ORAL at 21:30

## 2023-07-12 RX ADMIN — TRAZODONE HYDROCHLORIDE 50 MG: 50 TABLET ORAL at 21:30

## 2023-07-12 RX ADMIN — NICOTINE POLACRILEX 4 MG: 4 GUM, CHEWING BUCCAL at 15:14

## 2023-07-12 RX ADMIN — ESCITALOPRAM OXALATE 10 MG: 10 TABLET ORAL at 09:51

## 2023-07-12 RX ADMIN — HALOPERIDOL 10 MG: 10 TABLET ORAL at 18:06

## 2023-07-12 RX ADMIN — MIRTAZAPINE 15 MG: 15 TABLET, FILM COATED ORAL at 21:30

## 2023-07-12 RX ADMIN — HALOPERIDOL 10 MG: 10 TABLET ORAL at 09:51

## 2023-07-12 NOTE — PLAN OF CARE
Problem: Alteration in Thoughts and Perception  Goal: Treatment Goal: Gain control of psychotic behaviors/thinking, reduce/eliminate presenting symptoms and demonstrate improved reality functioning upon discharge  Outcome: Progressing  Goal: Verbalize thoughts and feelings  Description: Interventions:  - Promote a nonjudgmental and trusting relationship with the patient through active listening and therapeutic communication  - Assess patient's level of functioning, behavior and potential for risk  - Engage patient in 1 on 1 interactions  - Encourage patient to express fears, feelings, frustrations, and discuss symptoms    - [place] patient to reality, help patient recognize reality-based thinking   - Administer medications as ordered and assess for potential side effects  - Provide the patient education related to the signs and symptoms of the illness and desired effects of prescribed medications  Outcome: Progressing  Goal: Refrain from acting on delusional thinking/internal stimuli  Description: Interventions:  - Monitor patient closely, per order   - Utilize least restrictive measures   - Set reasonable limits, give positive feedback for acceptable   - Administer medications as ordered and monitor of potential side effects  Outcome: Progressing  Goal: Agree to be compliant with medication regime, as prescribed and report medication side effects  Description: Interventions:  - Offer appropriate PRN medication and supervise ingestion; conduct AIMS, as needed   Outcome: Progressing  Goal: Attend and participate in unit activities, including therapeutic, recreational, and educational groups  Description: Interventions:  -Encourage Visitation and family involvement in care  Outcome: Progressing  Goal: Recognize dysfunctional thoughts, communicate reality-based thoughts at the time of discharge  Description: Interventions:  - Provide medication and psycho-education to assist patient in compliance and developing insight into his/her illness   Outcome: Progressing  Goal: Complete daily ADLs, including personal hygiene independently, as able  Description: Interventions:  - Observe, teach, and assist patient with ADLS  - Monitor and promote a balance of rest/activity, with adequate nutrition and elimination   Outcome: Progressing     Problem: Ineffective Coping  Goal: Identifies ineffective coping skills  Outcome: Progressing  Goal: Identifies healthy coping skills  Outcome: Progressing  Goal: Demonstrates healthy coping skills  Outcome: Progressing  Goal: Participates in unit activities  Description: Interventions:  - Provide therapeutic environment   - Provide required programming   - Redirect inappropriate behaviors   Outcome: Progressing  Goal: Patient/Family participate in treatment and DC plans  Description: Interventions:  - Provide therapeutic environment  Outcome: Progressing  Goal: Patient/Family verbalizes awareness of resources  Outcome: Progressing  Goal: Understands least restrictive measures  Description: Interventions:  - Utilize least restrictive behavior  Outcome: Progressing  Goal: Free from restraint events  Description: - Utilize least restrictive measures   - Provide behavioral interventions   - Redirect inappropriate behaviors   Outcome: Progressing     Problem: Alteration in Orientation  Goal: Treatment Goal: Demonstrate a reduction of confusion and improved orientation to person, place, time and/or situation upon discharge, according to optimum baseline/ability  Outcome: Progressing  Goal: Interact with staff daily  Description: Interventions:  - Assess and re-assess patient's level of orientation  - Engage patient in 1 on 1 interactions, daily, for a minimum of 15 minutes   - Establish rapport/trust with patient   Outcome: Progressing  Goal: Express concerns related to confused thinking related to:  Description: Interventions:  - Encourage patient to express feelings, fears, frustrations, hopes  - Assign consistent caregivers   - [place]/re-orient patient as needed  - Allow comfort items, as appropriate  - Provide visual cues, signs, etc.   Outcome: Progressing  Goal: Allow medical examinations, as recommended  Description: Interventions:  - Provide physical/neurological exams and/or referrals, per provider   Outcome: Progressing  Goal: Cooperate with recommended testing/procedures  Description: Interventions:  - Determine need for ancillary testing  - Observe for mental status changes  - Implement falls/precaution protocol   Outcome: Progressing  Goal: Attend and participate in unit activities, including therapeutic, recreational, and educational groups  Description: Interventions:  - Provide therapeutic and educational activities daily, encourage attendance and participation, and document same in the medical record   - Provide appropriate opportunities for reminiscence   - Provide a consistent daily routine   - Encourage family contact/visitation   Outcome: Progressing  Goal: Complete daily ADLs, including personal hygiene independently, as able  Description: Interventions:  - Observe, teach, and assist patient with ADLS  Outcome: Progressing     Problem: HEMATOLOGIC - ADULT  Goal: Maintains hematologic stability  Description: INTERVENTIONS  - Assess for signs and symptoms of bleeding or hemorrhage  - Monitor labs  - Administer supportive blood products/factors as ordered and appropriate  Outcome: Progressing

## 2023-07-12 NOTE — TREATMENT TEAM
07/12/23 1045   Activity/Group Checklist   Group Other (Comment)  (art therapy)   Attendance Attended   Attendance Duration (min) 31-45   Interactions Interacted appropriately   Affect/Mood Appropriate   Goals Achieved Able to listen to others; Able to engage in interactions; Other (Comment)  (authentic, spontaneous self expression, connection, and insight)

## 2023-07-12 NOTE — NURSING NOTE
Pt observed resting in bed with sheet covering head & body after evening meal. Pt immediately responsive to verbal stimuli. Compliant with medication and appeared grateful to be receiving anti-psychotic therapy. Reports his day is "not good - because of the voices". Reports they "tell me they're going to kill me and I'm going to hell". Reports the auditory hallucinations are constantly present but that sometimes they are more easily manageable than others. Denies any significant events at home that contributed to the recent increase in symptoms that he is experiencing and reports he was compliant with meds outside of IP unit. Reports he went to the groups today and that they are helpful. Plans to play selam with peers this evening. Denies SI/HI/VH. Verbalizes understanding that staff is present to assist with symptoms when needed.

## 2023-07-12 NOTE — PROGRESS NOTES
[facility] Group Note     07/12/23 1000 07/12/23 1530   Activity/Group Checklist   Group [employer] meeting  [facility]) Other (Comment)  (Open Discussion About Control, Perspectives, and Mindful Self-Awareness)   Attendance Attended Attended   Attendance Duration (min) 0-15  (pulled by clinician) 46-60   Interactions Interacted appropriately  (completed activity but did not get an opportunity to share) Interacted appropriately   Affect/Mood Appropriate;Calm  (Focused) Calm  (Focused, but lethargic at times)   Goals Achieved Able to listen to others; Able to engage in interactions; Able to recieve feedback; Able to give feedback to another  (received resources/benefited from social presence of group) Able to listen to others; Able to engage in interactions; Able to recieve feedback; Able to give feedback to another  (received resources/benefited from social presence of group)

## 2023-07-12 NOTE — CMS CERTIFICATION NOTE
Recertification: Based upon physical, mental and social evaluations, I certify that inpatient psychiatric services continue to be medically necessary for this patient for a duration of 7 midnights for the treatment of  Schizophrenia [facility])   Available alternative community resources still do not meet the patient's mental health care needs. I further attest that an established written individualized plan of care has been updated and is outlined in the patient's medical records.

## 2023-07-12 NOTE — NURSING NOTE
RN will meet with patient at least twice per day to assess for any concerns, teach about prescribed medications and diagnosis. Patient will be taught and encouraged to utilize healthy coping skills.

## 2023-07-12 NOTE — NURSING NOTE
BIN   Black book bag   Pink lighter   Play toy wrench   Blunt effects spray   Lotion bigger bottle   durag black   MED's giving to pharmacy   [place] hat   Shorts with strings   [place] Rob[place] t shirt   Tooth brush   One single hood   Yannick   Pa DL   2 dollars 1'S   p ebt    health benefit card   Visa debit [id number]  Family first  card   Ex [place] DL  School ID   Picture  bjs card   Suicide prevention number   Sing care card     Bedside   White tshirt   3 boxers dacia   Light bluex2 one red and blue x1  Black shorts   Black annd blue short   Black shorts   Gray t shirt   Teal shirt   Two white shirts   Crocs   Dove mens hair product   lotion   Deodorant  Phone numbers on paper

## 2023-07-12 NOTE — PLAN OF CARE
Problem: Alteration in Thoughts and Perception  Goal: Treatment Goal: Gain control of psychotic behaviors/thinking, reduce/eliminate presenting symptoms and demonstrate improved reality functioning upon discharge  Outcome: Progressing  Goal: Verbalize thoughts and feelings  Description: Interventions:  - Promote a nonjudgmental and trusting relationship with the patient through active listening and therapeutic communication  - Assess patient's level of functioning, behavior and potential for risk  - Engage patient in 1 on 1 interactions  - Encourage patient to express fears, feelings, frustrations, and discuss symptoms    - [place] patient to reality, help patient recognize reality-based thinking   - Administer medications as ordered and assess for potential side effects  - Provide the patient education related to the signs and symptoms of the illness and desired effects of prescribed medications  Outcome: Progressing  Goal: Refrain from acting on delusional thinking/internal stimuli  Description: Interventions:  - Monitor patient closely, per order   - Utilize least restrictive measures   - Set reasonable limits, give positive feedback for acceptable   - Administer medications as ordered and monitor of potential side effects  Outcome: Progressing  Goal: Agree to be compliant with medication regime, as prescribed and report medication side effects  Description: Interventions:  - Offer appropriate PRN medication and supervise ingestion; conduct AIMS, as needed   Outcome: Progressing  Goal: Attend and participate in unit activities, including therapeutic, recreational, and educational groups  Description: Interventions:  -Encourage Visitation and family involvement in care  Outcome: Progressing  Goal: Recognize dysfunctional thoughts, communicate reality-based thoughts at the time of discharge  Description: Interventions:  - Provide medication and psycho-education to assist patient in compliance and developing insight into his/her illness   Outcome: Progressing  Goal: Complete daily ADLs, including personal hygiene independently, as able  Description: Interventions:  - Observe, teach, and assist patient with ADLS  - Monitor and promote a balance of rest/activity, with adequate nutrition and elimination   Outcome: Progressing

## 2023-07-12 NOTE — PROGRESS NOTES
07/12/23 0750   Team Meeting   Meeting Type Daily Rounds   Team Members Present   Team Members Present Physician;Nurse;; Other (Discipline and Name)   Physician Team Member Dr. Viridiana Viveros / Dr. Turner Acosta / [facility] / [address] [place] [address] Team Member LINDSAY [facility] Management Team Member Yoni Siegel / Victor Manuel Arreola   Other (Discipline and Name) Arcadio Gleason (art therapist)   Patient/Family Present   Patient Present No   Patient's Family Present No     Status: Pt is reported to be a 12 from 25 [facility] 201 due to increase  stating they are going to kill him. Pt is reported to have passive SI but no plan or intent. Pt is reported to be denying and HI or VH. Pt is reported to have slept through the night. Readmit score: 24(red), AUDIT: 0, PAWSS: N/a, BAT: 0, UDS: Negative    Medication: No medication changes or PRNs at this time. D/C: No discharge date set at this time.

## 2023-07-12 NOTE — ED NOTES
Call was placed to [address] [place] [address], message was left to complete live precert. Writer received return message from Son lanier at [facility] instructing this writer to fax clinical to [phone]. Clinical was sent, fax confirmation report received.

## 2023-07-12 NOTE — H&P
Psychiatric Evaluation - [facility] 33 y.o. male MRN: [id number]  Unit/Bed#: -01 Encounter: [id number]    Assessment/Plan   Principal Problem:    Schizophrenia ([address])  Active Problems:    Tobacco abuse    Plan:   Continue Haldol 10 mg BID  Lexapro 10 mg daily  Remeron 15 mg Po HS  Start Zyprexa 5 mg PO HS  Admit to [address] on 201 status for safety and treatment  No 1:1 continuous observation needed at this time, as patient feels safe on the unit. Check admission labs. Get collaterals. Collaborate with family for baseline assessment and disposition planning. Case discussed with treatment team.    Treatment options and alternatives were reviewed with the patient, who concurs with the above plan. Risks, benefits, and possible side effects of medications were explained to the patient, and he verbalizes understanding.      -----------------------------------    Chief Complaint: "I was hearing voices"    History of Present Illness     Per ED provider on 910:"27 year-old male with a history of schizophrenia presents with a chief complaint of increasing auditory hallucinations after recent discharge from [facility]. Patient denies any command hallucinations but states the voices have been threatening to harm him. Patient denies any thoughts of self harm or harm to others. Patient states he has been compliant with his medications and denies any illicit substance use. Impression and plan: Differently with auditory hallucinations. Patient may benefit from inpatient psychiatric admission for involuntary admission though I do not believe patient meets criteria for involuntary admission to the hospital.  We will have crisis evaluate patient, monitor patient, reassess. I have ordered patient's psychiatric medications that I encouraged him to take."    Per Crisis worker on 7/11:"Pt presents to the ED as a self referral from home.  Pt is calm, cooperative and maintains good eye contact. Pt confirms having been recently discharged from [facility] on 7/7/23. Pt denies SI's / HI's and or visual hallucinations. Pt reports experiencing non-commanding auditory hallucinations. Pt states, "I felt good when I left the hospital and they did not start again until two days later." Pt reports being compliant w/medications. Pt reports OP therapy appointment is too far from his home and he needs to call a psychiatrist to schedule an appointment. Pt denies any issues w/sleep and or appetite at this time. Pt denies hx of SA'a and SIB's. Pt reports having cancelled [place] MA and recently applied for [place]. Pt denies any medical issues, legal issues, use of illegal substances and or ETOH. Pt reports smoking aprox 5 cigarettes daily. CW and pt discussed pt participate in telepsych consult to inquire on medication adjustments if possible. Pt does report baseline symptoms of auditory hallucinations for quite some amount of time.   CW sent TT to Dr. Ketan Ulrich requesting an order for telepsych consult. TDS, TRISTON"    This is 34 yo male with hx of Schizophrenia admitted to inpatient unit on voluntary status for worsening command voices to hurt self. Patient was discharged form this unit on 7/7, felt better for 2 days and then the voices have returned. Patient also endorses depressed mood, anhedonia, lack of energy, motivation, tiredness, poor sleep and anxiety. He also endorses paranoia and command voices to hurt self. Denies any intent or plan. Patient was hospitalized 6 times in the past year. Denies any stressors at home. Reports compliance with medications. Denies any drug use, smokes 4-5 cigarettes.    Psychiatric Review Of Systems:  Medication side effects: none  Sleep: Poor  Appetite: no change  Hygiene: able to tend to instrumental and basic ADLs  Anxiety: Yes  Psychotic Symptoms: Yes  Depression Symptoms: Yes  Manic Symptoms: denies  PTSD Symptoms: denies  Suicidal Thoughts: denies  Homicidal Thoughts: denies    Medical Review Of Systems:   Complete ROS is negative, except as noted above. Historical Information     Psychiatric History:   Psychiatry diagnosis:Schizophrenia  Inpatient Hx: This is the sixth time  Suicidal Hx:Denies  Self harming behavior Hx:Denies  Violent behavior Hx:Denies  Outpatient Hx: "Not yet"  Medications/Trials: "I dont remember"    Substance Abuse History:  Denies any drugs or alcohol use. I spent time with [facility] in counseling and education on risk of substance abuse. I assessed motivation and encouraged him for treatment as appropriate. Family Psychiatric History:   Patient denies any known family history of psychiatric illness, suicide attempt, or substance abuse    Social History:  Education:   Learning Disabilities:None  Marital history:   Living arrangement: Lives with aunt  Occupational History: unemployed on SSD  Functioning Relationships:   Other Pertinent History:    Hx: Denies  Legal Hx: Denies    Traumatic History:   Denies    Past Medical History:  History of Seizures: no  History of Head injury with loss of consciousness: no    Past Medical History:   Past Medical History:   Diagnosis Date   • Hallucination    • Schizophrenia ([address])         -----------------------------------  Objective    Temp:  [96.6 °F (35.9 °C)-98.2 °F (36.8 °C)] 96.6 °F (35.9 °C)  HR:  [] 105  Resp:  [17-19] 19  BP: (107-147)/(64-89) 147/86    Mental Status Evaluation:    Appearance:  disheveled, older than stated age and overweight   Behavior:  guarded   Speech:  soft   Mood:  anxious and depressed   Affect:  constricted, flat and mood-congruent   Thought Process:  goal directed and slow   Thought Content:  paranoia   Perceptual Disturbances:  Auditory hallucinations without commands   Risk Potential: Suicidal Ideations none  Homicidal Ideations none  Potential for Aggression No   Sensorium:  person, place and time/date   Memory:  recent and remote memory grossly intact Consciousness:  alert and awake    Attention: attention span appeared shorter than expected for age   Insight:  limited   Judgment: limited   Gait/Station: normal gait/station   Motor Activity: no abnormal movements       Meds/Allergies   Allergies   Allergen Reactions   • Pollen Extract Sneezing     all current active meds have been reviewed    Behavioral Health Medications: all current active meds have been reviewed. Changes as above. Laboratory results:  I have personally reviewed all pertinent laboratory/tests results.   Recent Results (from the past 48 hour(s))   POCT alcohol breath test    Collection Time: 07/10/23 11:19 PM   Result Value Ref Range    EXTBreath Alcohol 0.00    Rapid drug screen, urine    Collection Time: 07/11/23  9:21 AM   Result Value Ref Range    Amph/Meth UR Negative Negative    Barbiturate Ur Negative Negative    Benzodiazepine Urine Negative Negative    Cocaine Urine Negative Negative    Methadone Urine Negative Negative    Opiate Urine Negative Negative    PCP Ur Negative Negative    THC Urine Negative Negative    Oxycodone Urine Negative Negative   Urinalysis    Collection Time: 07/12/23  9:39 AM   Result Value Ref Range    Color, UA Yellow     Clarity, UA Clear     Specific Gravity, UA 1.025 1.005 - 1.030    pH, UA 6.0 4.5, 5.0, 5.5, 6.0, 6.5, 7.0, 7.5, 8.0    Leukocytes, UA Negative Negative    Nitrite, UA Negative Negative    Protein, UA Negative Negative mg/dl    Glucose, UA Negative Negative mg/dl    Ketones, UA Negative Negative mg/dl    Urobilinogen, UA <2.0 <2.0 mg/dl mg/dl    Bilirubin, UA Negative Negative    Occult Blood, UA Negative Negative    RBC, UA None Seen None Seen, 2-4 /hpf    WBC, UA None Seen None Seen, 2-4, 5-60 /hpf    Epithelial Cells Occasional None Seen, Occasional /hpf    Bacteria, UA None Seen None Seen, Occasional /hpf              -----------------------------------    Risks / Benefits of Treatment:     Risks, benefits, and possible side effects of medications explained to patient. The patient verbalizes understanding and agreement for treatment. Counseling / Coordination of Care:     Patient's presentation on admission and proposed treatment plan were discussed with the treatment team.  Diagnosis, medication changes and treatment plan were reviewed with the patient. Recent stressors were discussed with the patient. Events leading to admission were reviewed with the patient. Importance of medication and treatment compliance was reviewed with the patient.           Inpatient Psychiatric Certification:     Certification: Based upon physical, mental and social evaluations, I certify that inpatient psychiatric services are medically necessary for this patient for a duration of 7 midnights for the treatment of Schizophrenia ([address])

## 2023-07-12 NOTE — TREATMENT PLAN
TREATMENT PLAN REVIEW [facility] 33 y.o. 1995 male MRN: [id number]    [place] Room / Bed: [facility] 216/[facility] 216-01 Encounter: [id number]          Admit Date/Time:  7/11/2023  9:52 PM    Treatment Team: Attending Provider: Ant Desai MD; Patient Care Assistant: Blayne Vargas; Patient Care Technician: Crystal Vyas;  Charge Nurse: Vicky Telles RN; Charge Nurse: Ester Mendoza RN; : Jose De Jesus Pena; Licensed Practical Nurse: Miryam Nava LPN; Patient Care Assistant: Nicola Mauro    Diagnosis: Principal Problem:    Schizophrenia [facility]  Active Problems:    Tobacco abuse      Patient Strengths/Assets: cooperative, motivation for treatment/growth, patient is on a voluntary commitment    Patient Barriers/Limitations: limited support system, resistance to treatment    Short Term Goals: decrease in depressive symptoms, decrease in anxiety symptoms, decrease in psychotic symptoms, improvement in insight, sleep improvement, improvement in appetite, mood stabilization    Long Term Goals: improvement in depression, improvement in anxiety, resolution of depressive symptoms, stabilization of mood, improved insight, acceptance of need for psychiatric medications, acceptance of need for psychiatric treatment, acceptance of psychiatric diagnosis, adequate self care    Progress Towards Goals: starting psychiatric medications as prescribed    Recommended Treatment: medication management, patient medication education, group therapy, milieu therapy, continued Behavioral Health psychiatric evaluation/assessment process    Treatment Frequency: daily medication monitoring, group and milieu therapy daily, monitoring through interdisciplinary rounds, monitoring through weekly patient care conferences    Expected Discharge Date:  5-7 days    Discharge Plan: referral for outpatient medication management with a psychiatrist, referral for outpatient psychotherapy    Treatment Plan Created/Updated By: Donovan Andrea MD

## 2023-07-12 NOTE — ASSESSMENT & PLAN NOTE
• Admission labs reviewed, UA acceptable  • Vitamin D 25-hydroxy, lipid panel, CMP, CBC, vitamin B12, folate, TSH labs pending  • Vitals stable   • UDS negative  • COVID-19 negative  • EKG reveals NSR, 75bpm   • Medically stable for continued inpatient psychiatric treatment based on available results  • Please contact [facility] with any questions or concerns

## 2023-07-12 NOTE — NURSING NOTE
Aissatou Kerr ([id number]) arrived on unit at 2140 from [facility]. Admitted for increase AH's. He was recently discharged from our facility on 07/07/23. He reports AH's started again two days after d/c. Pt reports the voices are non-command type, they tell him they're going to kill him. He further reports passive SI with no plan or intent. He reports feeling safe in hospital environment. UDS -, VS WNL, No allergies, No pain, No medical hx. Pt denies HI and VH's. He denies abuse and trauma hx. He lives with his family and reports he will return home upon d/c. Pt reports compliance with prescribed medication. Q 7 min safety checks maintained.

## 2023-07-12 NOTE — NURSING NOTE
Pt resting in bed throughout the morning. OOB more this afternoon and is playing cards with peer. Reports AH that are the same compared to yesterday. Sleeping well overnight and appetite is good. Denies any questions or concerns.

## 2023-07-12 NOTE — CONSULTS
[address] [facility]  Consult  Name: Viola Pagan 33 y.o. male I MRN: [id number]  Unit/Bed#: -01 I Date of Admission: 7/11/2023   Date of Service: 7/12/2023 I Hospital Day: 1    Inpatient consult for Medical Clearance for [address] patient  Consult performed by: Lissy Hurtado PA-C  Consult ordered by: Bing Dickinson MD          Assessment/Plan   Medical clearance for psychiatric admission  Assessment & Plan  • Admission labs reviewed, UA acceptable  • Vitamin D 25-hydroxy, lipid panel, CMP, CBC, vitamin B12, folate, TSH labs pending  • Vitals stable   • UDS negative  • COVID-19 negative  • EKG reveals NSR, 75bpm   • Medically stable for continued inpatient psychiatric treatment based on available results  • Please contact [facility] with any questions or concerns    T wave inversion in EKG  Assessment & Plan  · Stable from prior  · No complaints of chest pain    Tobacco abuse  Assessment & Plan  ·  on cessation  · Nicotine replacement therapy while admitted    * Schizophrenia [facility])  Assessment & Plan  · Management per psychiatry         VTE Prophylaxis: VTE Score: 1 Low Risk (Score 0-2) - Encourage Ambulation. Recommendations for Discharge:  · Discharge timeline per primary team.  Routine follow-up with primary care provider. Total Time Spent on Date of Encounter in care of patient: 25 minutes This time was spent on one or more of the following: performing physical exam; counseling and coordination of care; obtaining or reviewing history; documenting in the medical record; reviewing/ordering tests, medications or procedures; communicating with other healthcare professionals and discussing with patient's family/caregivers. Collaboration of Care: Were Recommendations Directly Discussed with Primary Treatment Team? No    History of Present Illness:  Viola Pagan is a 33 y.o. male who is originally admitted to the behavioral health service due to hallucinations.  We are consulted for management of chronic medical conditions. Patient denies any chronic medical conditions for which he takes daily medications. Patient should continue all prior to admission medications as prescribed by primary care provider/outpatient specialists. Patient denies recent travel, illness or sick contacts. Patient denies smoking, drinking or drug use. Available admission lab work and vitals are acceptable. Patient feels a baseline physical health. Patient appears medically stable for inpatient psychiatric treatment at this time. Please contact [facility] with any medical questions or concerns if issues should arise. Review of Systems:  Review of Systems   Constitutional: Negative for appetite change, chills, fatigue and fever. HENT: Negative for ear pain, sore throat and trouble swallowing. Eyes: Negative for visual disturbance. Respiratory: Negative for cough, chest tightness, shortness of breath and wheezing. Cardiovascular: Negative for chest pain, palpitations and leg swelling. Gastrointestinal: Negative for abdominal distention, abdominal pain, diarrhea, nausea and vomiting. Endocrine: Negative. Genitourinary: Negative for dysuria, flank pain and hematuria. Musculoskeletal: Negative for arthralgias, gait problem and myalgias. Skin: Negative for pallor. Allergic/Immunologic: Negative for immunocompromised state. Neurological: Negative for dizziness, syncope, light-headedness, numbness and headaches. Psychiatric/Behavioral: Positive for hallucinations. All other systems reviewed and are negative. Past Medical and Surgical History:   Past Medical History:   Diagnosis Date   • Hallucination    • Schizophrenia [facility])        Past Surgical History:   Procedure Laterality Date   • COLONOSCOPY         Meds/Allergies:  PTA meds:   Prior to Admission Medications   Prescriptions Last Dose Informant Patient Reported?  Taking?   escitalopram (LEXAPRO) 10 mg tablet   No No   Sig: Take 1 tablet (10 mg total) by mouth daily   haloperidol (HALDOL) 10 mg tablet   No No   Sig: Take 1 tablet (10 mg total) by mouth 2 (two) times a day   mirtazapine (REMERON) 15 mg tablet   No No   Sig: Take 1 tablet (15 mg total) by mouth daily at bedtime      Facility-Administered Medications: None       Allergies: Allergies   Allergen Reactions   • Pollen Extract Sneezing       Social History:  Marital Status: Single  Substance Use History:   Social History     Substance and Sexual Activity   Alcohol Use Not Currently    Comment: pt reports he does not drink     Social History     Tobacco Use   Smoking Status Every Day   • Packs/day: 0.25   • Types: Cigarettes   Smokeless Tobacco Never   Tobacco Comments    Pt reports he does not want to quit and does not want information. He declines Quitline information     Social History     Substance and Sexual Activity   Drug Use Not Currently       Family History:  History reviewed. No pertinent family history. Physical Exam:   Vitals:   Blood Pressure: 147/86 (07/12/23 1045)  Pulse: 105 (07/12/23 1045)  Temperature: (!) 96.6 °F (35.9 °C) (07/12/23 1045)  Temp Source: Tympanic (07/12/23 1045)  Respirations: 19 (07/12/23 1045)  Height: 5' 7" (170.2 cm) (07/11/23 2155)  Weight - Scale: 96.8 kg (213 lb 6.4 oz) (07/11/23 2155)  SpO2: 99 % (07/12/23 1045)    Physical Exam  Vitals and nursing note reviewed. Constitutional:       General: He is awake. He is not in acute distress. HENT:      Head: Normocephalic and atraumatic. Eyes:      Extraocular Movements: Extraocular movements intact. Pupils: Pupils are equal, round, and reactive to light. Cardiovascular:      Rate and Rhythm: Normal rate and regular rhythm. Pulses: Normal pulses. Heart sounds: Normal heart sounds. No murmur heard. No friction rub. No gallop. Pulmonary:      Effort: Pulmonary effort is normal. No respiratory distress. Breath sounds: Normal breath sounds.    Abdominal: General: Abdomen is flat. There is no distension. Palpations: Abdomen is soft. Tenderness: There is no abdominal tenderness. Musculoskeletal:         General: Normal range of motion. Cervical back: Normal range of motion. Right lower leg: No edema. Left lower leg: No edema. Skin:     General: Skin is warm and dry. Neurological:      General: No focal deficit present. Mental Status: He is alert. Comments: CN II-XII grossly intact       Additional Data:   Lab Results:                    Lab Results   Component Value Date/Time    HGBA1C 4.6 06/29/2023 05:30 AM               Imaging: No pertinent imaging reviewed. No orders to display       EKG, Pathology, and Other Studies Reviewed on Admission:   · EKG: NSR. HR 75bpm,  (6/28/2023). ** Please Note: This note may have been constructed using a voice recognition system.  **

## 2023-07-13 LAB
25(OH)D3 SERPL-MCNC: 8.8 NG/ML (ref 30–100)
ALBUMIN SERPL BCP-MCNC: 3.9 G/DL (ref 3.5–5)
ALP SERPL-CCNC: 89 U/L (ref 34–104)
ALT SERPL W P-5'-P-CCNC: 163 U/L (ref 7–52)
ANION GAP SERPL CALCULATED.3IONS-SCNC: 7 MMOL/L
AST SERPL W P-5'-P-CCNC: 59 U/L (ref 13–39)
BASOPHILS # BLD AUTO: 0.06 THOUSANDS/ÂΜL (ref 0–0.1)
BASOPHILS NFR BLD AUTO: 1 % (ref 0–1)
BILIRUB SERPL-MCNC: 0.44 MG/DL (ref 0.2–1)
BUN SERPL-MCNC: 11 MG/DL (ref 5–25)
CALCIUM SERPL-MCNC: 9.1 MG/DL (ref 8.4–10.2)
CHLORIDE SERPL-SCNC: 105 MMOL/L (ref 96–108)
CHOLEST SERPL-MCNC: 205 MG/DL
CO2 SERPL-SCNC: 27 MMOL/L (ref 21–32)
CREAT SERPL-MCNC: 0.95 MG/DL (ref 0.6–1.3)
EOSINOPHIL # BLD AUTO: 0.78 THOUSAND/ÂΜL (ref 0–0.61)
EOSINOPHIL NFR BLD AUTO: 9 % (ref 0–6)
ERYTHROCYTE [DISTWIDTH] IN BLOOD BY AUTOMATED COUNT: 13.2 % (ref 11.6–15.1)
EST. AVERAGE GLUCOSE BLD GHB EST-MCNC: 88 MG/DL
FOLATE SERPL-MCNC: 6.4 NG/ML
GFR SERPL CREATININE-BSD FRML MDRD: 110 ML/MIN/1.73SQ M
GLUCOSE P FAST SERPL-MCNC: 81 MG/DL (ref 65–99)
GLUCOSE SERPL-MCNC: 81 MG/DL (ref 65–140)
HBA1C MFR BLD: 4.7 %
HCT VFR BLD AUTO: 40.9 % (ref 36.5–49.3)
HDLC SERPL-MCNC: 48 MG/DL
HGB BLD-MCNC: 12.5 G/DL (ref 12–17)
IMM GRANULOCYTES # BLD AUTO: 0.05 THOUSAND/UL (ref 0–0.2)
IMM GRANULOCYTES NFR BLD AUTO: 1 % (ref 0–2)
LDLC SERPL CALC-MCNC: 140 MG/DL (ref 0–100)
LYMPHOCYTES # BLD AUTO: 2.76 THOUSANDS/ÂΜL (ref 0.6–4.47)
LYMPHOCYTES NFR BLD AUTO: 32 % (ref 14–44)
MCH RBC QN AUTO: 24.8 PG (ref 26.8–34.3)
MCHC RBC AUTO-ENTMCNC: 30.6 G/DL (ref 31.4–37.4)
MCV RBC AUTO: 81 FL (ref 82–98)
MONOCYTES # BLD AUTO: 0.95 THOUSAND/ÂΜL (ref 0.17–1.22)
MONOCYTES NFR BLD AUTO: 11 % (ref 4–12)
NEUTROPHILS # BLD AUTO: 4.09 THOUSANDS/ÂΜL (ref 1.85–7.62)
NEUTS SEG NFR BLD AUTO: 46 % (ref 43–75)
NONHDLC SERPL-MCNC: 157 MG/DL
NRBC BLD AUTO-RTO: 0 /100 WBCS
PLATELET # BLD AUTO: 208 THOUSANDS/UL (ref 149–390)
PMV BLD AUTO: 10.4 FL (ref 8.9–12.7)
POTASSIUM SERPL-SCNC: 4.1 MMOL/L (ref 3.5–5.3)
PROT SERPL-MCNC: 6.9 G/DL (ref 6.4–8.4)
RBC # BLD AUTO: 5.05 MILLION/UL (ref 3.88–5.62)
SODIUM SERPL-SCNC: 139 MMOL/L (ref 135–147)
TRIGL SERPL-MCNC: 86 MG/DL
TSH SERPL DL<=0.05 MIU/L-ACNC: 1.71 UIU/ML (ref 0.45–4.5)
VIT B12 SERPL-MCNC: 250 PG/ML (ref 180–914)
WBC # BLD AUTO: 8.69 THOUSAND/UL (ref 4.31–10.16)

## 2023-07-13 PROCEDURE — 99232 SBSQ HOSP IP/OBS MODERATE 35: CPT | Performed by: STUDENT IN AN ORGANIZED HEALTH CARE EDUCATION/TRAINING PROGRAM

## 2023-07-13 PROCEDURE — 86780 TREPONEMA PALLIDUM: CPT | Performed by: PHYSICIAN ASSISTANT

## 2023-07-13 PROCEDURE — 80061 LIPID PANEL: CPT | Performed by: PHYSICIAN ASSISTANT

## 2023-07-13 PROCEDURE — 84443 ASSAY THYROID STIM HORMONE: CPT | Performed by: PHYSICIAN ASSISTANT

## 2023-07-13 PROCEDURE — 82746 ASSAY OF FOLIC ACID SERUM: CPT | Performed by: PHYSICIAN ASSISTANT

## 2023-07-13 PROCEDURE — 82306 VITAMIN D 25 HYDROXY: CPT | Performed by: PHYSICIAN ASSISTANT

## 2023-07-13 PROCEDURE — 83036 HEMOGLOBIN GLYCOSYLATED A1C: CPT | Performed by: PHYSICIAN ASSISTANT

## 2023-07-13 PROCEDURE — 82607 VITAMIN B-12: CPT | Performed by: PHYSICIAN ASSISTANT

## 2023-07-13 PROCEDURE — 80053 COMPREHEN METABOLIC PANEL: CPT | Performed by: PHYSICIAN ASSISTANT

## 2023-07-13 PROCEDURE — 85025 COMPLETE CBC W/AUTO DIFF WBC: CPT | Performed by: PHYSICIAN ASSISTANT

## 2023-07-13 RX ADMIN — ESCITALOPRAM OXALATE 10 MG: 10 TABLET ORAL at 10:00

## 2023-07-13 RX ADMIN — TRAZODONE HYDROCHLORIDE 50 MG: 50 TABLET ORAL at 21:15

## 2023-07-13 RX ADMIN — HALOPERIDOL 10 MG: 10 TABLET ORAL at 17:50

## 2023-07-13 RX ADMIN — MIRTAZAPINE 15 MG: 15 TABLET, FILM COATED ORAL at 21:15

## 2023-07-13 RX ADMIN — HALOPERIDOL 10 MG: 10 TABLET ORAL at 10:00

## 2023-07-13 RX ADMIN — OLANZAPINE 5 MG: 5 TABLET, FILM COATED ORAL at 21:15

## 2023-07-13 RX ADMIN — NICOTINE POLACRILEX 4 MG: 4 GUM, CHEWING BUCCAL at 14:34

## 2023-07-13 NOTE — PLAN OF CARE
Problem: Alteration in Thoughts and Perception  Goal: Treatment Goal: Gain control of psychotic behaviors/thinking, reduce/eliminate presenting symptoms and demonstrate improved reality functioning upon discharge  Outcome: Progressing  Goal: Verbalize thoughts and feelings  Description: Interventions:  - Promote a nonjudgmental and trusting relationship with the patient through active listening and therapeutic communication  - Assess patient's level of functioning, behavior and potential for risk  - Engage patient in 1 on 1 interactions  - Encourage patient to express fears, feelings, frustrations, and discuss symptoms    - Annabella patient to reality, help patient recognize reality-based thinking   - Administer medications as ordered and assess for potential side effects  - Provide the patient education related to the signs and symptoms of the illness and desired effects of prescribed medications  Outcome: Progressing  Goal: Refrain from acting on delusional thinking/internal stimuli  Description: Interventions:  - Monitor patient closely, per order   - Utilize least restrictive measures   - Set reasonable limits, give positive feedback for acceptable   - Administer medications as ordered and monitor of potential side effects  Outcome: Progressing  Goal: Agree to be compliant with medication regime, as prescribed and report medication side effects  Description: Interventions:  - Offer appropriate PRN medication and supervise ingestion; conduct AIMS, as needed   Outcome: Progressing  Goal: Attend and participate in unit activities, including therapeutic, recreational, and educational groups  Description: Interventions:  -Encourage Visitation and family involvement in care  Outcome: Progressing  Goal: Recognize dysfunctional thoughts, communicate reality-based thoughts at the time of discharge  Description: Interventions:  - Provide medication and psycho-education to assist patient in compliance and developing insight into his/her illness   Outcome: Progressing  Goal: Complete daily ADLs, including personal hygiene independently, as able  Description: Interventions:  - Observe, teach, and assist patient with ADLS  - Monitor and promote a balance of rest/activity, with adequate nutrition and elimination   Outcome: Progressing

## 2023-07-13 NOTE — TREATMENT TEAM
07/13/23 1405   Activity/Group Checklist   Group Admission/Discharge   Attendance Attended   Attendance Duration (min) 0-15   Interactions Interacted appropriately   Affect/Mood Blunted/flat   Goals Achieved Identified triggers; Identified feelings; Able to listen to others; Able to engage in interactions; Other (Comment)  (pt independently filled out the admission self-assessment form with this therapist available for support if needed. once completed, pt had no questions or concerns.  copy of form to be placed in DC folder.)

## 2023-07-13 NOTE — PROGRESS NOTES
Progress Note - [facility] 33 y.o. male MRN: [id number]  Unit/Bed#: U 216-01 Encounter: [id number]    Assessment/Plan   Principal Problem:    Schizophrenia [facility])  Active Problems:    Medical clearance for psychiatric admission    Tobacco abuse    T wave inversion in EKG      Subjective: Patient was seen, chart was reviewed, and case was discussed with the team. Patient appears withdrawn, unkempt, isolative and in bed till lunch. He endorses depressed mood, lack of motivation, paranoia and negative voices. Sleep and appetite are ok. He is compliant with medications. Denies any side effects.    Behavior over the last 24 hours:  unchanged  Sleep: normal  Appetite: normal  Medication side effects: No    Medical ROS: Pertinent items are noted in HPI.all other systems are negative    Current Medications:  Current Facility-Administered Medications   Medication Dose Route Frequency   • acetaminophen (TYLENOL) tablet 650 mg  650 mg Oral Q6H PRN   • acetaminophen (TYLENOL) tablet 650 mg  650 mg Oral Q4H PRN   • acetaminophen (TYLENOL) tablet 975 mg  975 mg Oral Q6H PRN   • aluminum-magnesium hydroxide-simethicone (MAALOX) oral suspension 30 mL  30 mL Oral Q4H PRN   • haloperidol lactate (HALDOL) injection 2.5 mg  2.5 mg Intramuscular Q4H PRN Max 4/day    And   • LORazepam (ATIVAN) injection 1 mg  1 mg Intramuscular Q4H PRN Max 4/day    And   • benztropine (COGENTIN) injection 0.5 mg  0.5 mg Intramuscular Q4H PRN Max 4/day   • haloperidol lactate (HALDOL) injection 5 mg  5 mg Intramuscular Q4H PRN Max 4/day    And   • LORazepam (ATIVAN) injection 2 mg  2 mg Intramuscular Q4H PRN Max 4/day    And   • benztropine (COGENTIN) injection 1 mg  1 mg Intramuscular Q4H PRN Max 4/day   • benztropine (COGENTIN) tablet 1 mg  1 mg Oral Q4H PRN Max 6/day   • bisacodyl (DULCOLAX) rectal suppository 10 mg  10 mg Rectal Daily PRN   • hydrOXYzine HCL (ATARAX) tablet 50 mg  50 mg Oral Q6H PRN Max 4/day    Or   • diphenhydrAMINE (BENADRYL) injection 50 mg  50 mg Intramuscular Q6H PRN   • escitalopram (LEXAPRO) tablet 10 mg  10 mg Oral Daily   • haloperidol (HALDOL) tablet 1 mg  1 mg Oral Q6H PRN   • haloperidol (HALDOL) tablet 10 mg  10 mg Oral BID   • haloperidol (HALDOL) tablet 2.5 mg  2.5 mg Oral Q4H PRN Max 4/day   • haloperidol (HALDOL) tablet 5 mg  5 mg Oral Q4H PRN Max 4/day   • hydrOXYzine HCL (ATARAX) tablet 100 mg  100 mg Oral Q6H PRN Max 4/day    Or   • LORazepam (ATIVAN) injection 2 mg  2 mg Intramuscular Q6H PRN   • hydrOXYzine HCL (ATARAX) tablet 25 mg  25 mg Oral Q6H PRN Max 4/day   • mirtazapine (REMERON) tablet 15 mg  15 mg Oral HS   • nicotine polacrilex (NICORETTE) gum 4 mg  4 mg Oral Q2H PRN   • OLANZapine (ZyPREXA) tablet 5 mg  5 mg Oral HS   • polyethylene glycol (MIRALAX) packet 17 g  17 g Oral Daily PRN   • senna-docusate sodium (SENOKOT S) 8.6-50 mg per tablet 1 tablet  1 tablet Oral Daily PRN   • traZODone (DESYREL) tablet 50 mg  50 mg Oral HS PRN       Behavioral Health Medications:   all current active meds have been reviewed. Vitals:  Vitals:    07/13/23 0754   BP: 122/65   Pulse: 66   Resp: 18   Temp: 97.9 °F (36.6 °C)   SpO2: 100%       Laboratory results:    I have personally reviewed all pertinent laboratory/tests results. Mental Status Evaluation:    Appearance:  overweight and unkempt   Behavior:  guarded   Speech:  soft and scant   Mood:  depressed   Affect:  constricted and flat   Thought Process:  goal directed and slow   Thought Content:  paranoia   Perceptual Disturbances:  Auditory hallucinations without commands   Risk Potential: Suicidal Ideations none  Homicidal Ideations none  Potential for Aggression No   Sensorium:  person, place and time/date   Memory:  recent and remote memory grossly intact   Consciousness:  alert and awake    Attention: attention span appeared shorter than expected for age   Insight:  limited   Judgment: limited   Gait/Station: normal gait/station Motor Activity: no abnormal movements       Progress Toward Goals: Progressing    Recommended Treatment: Continue with pharmacotherapy, group therapy, milieu therapy and occupational therapy. Risks, benefits and possible side effects of Medications:   Risks, benefits, alternatives, and possible side effects of patient's psychiatric medications were discussed with patient.

## 2023-07-13 NOTE — PLAN OF CARE
Problem: DISCHARGE PLANNING - CARE MANAGEMENT  Goal: Discharge to post-acute care or home with appropriate resources  Description: INTERVENTIONS:  - Conduct assessment to determine patient/family and health care team treatment goals, and need for post-acute services based on payer coverage, community resources, and patient preferences, and barriers to discharge  - Address psychosocial, clinical, and financial barriers to discharge as identified in assessment in conjunction with the patient/family and health care team  - Arrange appropriate level of post-acute services according to patient’s   needs and preference and payer coverage in collaboration with the physician and health care team  - Communicate with and update the patient/family, physician, and health care team regarding progress on the discharge plan  - Arrange appropriate transportation to post-acute venues  7/13/2023 1054 by Barby Hickman  Outcome: Progressing  7/13/2023 1053 by Barby Hickman  Note: Pt met with CM to review and sign ROIs and to complete intake. Pt read and signed treatment plan.

## 2023-07-13 NOTE — TREATMENT TEAM
07/13/23 1300   Activity/Group Checklist   Group Other (Comment)  (art therapy/structured journaling)   Attendance Attended   Attendance Duration (min) 46-60   Interactions Other (Comment)  (soft spoken, delayed responses to questions asked, seemed to have been talking to self at various moments of the session)   Affect/Mood Blunted/flat   Goals Achieved Able to engage in interactions; Able to listen to others; Other (Comment)  (authentic, spontaneous self expression, connection, and insight)

## 2023-07-13 NOTE — CASE MANAGEMENT
INTAKE     Readmit score:  (red)24    Confirmed Address    [address] [place] [address]  Pt confirmed. [place]: [place]       Resides in the home with/can return? Pt lives with his aunt and can return. Confirmed Phone Number: [phone]  Pt confirmed. Commitment Status/Admitted from: [facility] ED    Presenting C/O:             Pt stated that he was having AH that got loud and were telling him to hurt himself. Outpatient:    Pt stated that he doesn’t currently have MHOP anywhere. ACT/ICM/CPS/WRT/SC: N/A   PCP:    Rocio Aguirre MD  [phone]  Pt confirmed   Work/Income:       Unemployed   Pt reported he receives SSD $1700 per month    Legal/  Probation/[place] [facility]:    Denies   Access to Firearms:    Denies   Referrals Needed: Case management   Transport at Discharge:    Pt reported family can pick him up    IMM:   [place] Text ALEXIA: N/A   Emergency Contact:     Lila Chavez [phone]  Pt confirmed    ROIs obtained:       [address]:     Pt stated that he cancelled his insurance through [facility]. Pt stated that they applied for [place] Medicaid about a week ago. Audit: 0        PAWSS: N/a BAT: 0 UDS: Negative      Pt reviewed and signed treatment plan.

## 2023-07-13 NOTE — CASE MANAGEMENT
CM called Pt's aunt Hermila Cho @[phone] to inform her of the Pt's admission to the unit. CM left a voicemail. Hanh contacted CM. CM informed her of the Pt's admission to the unit. CM asked if the Pt is able to return to her residence when discharge is set. Hermila Cho stated that the Pt can return to her house. CM asked if she had knew anything about the Pt's insurance. Hanh stated that his sisters Darius and Laura Cameron would have a better idea of his situation about his insurance then she would since he has only been with her for about a month. CM met with Pt to sign ALEXIA for his sister Darius. CM stated that they will need to contact his insurance to confirm he was terminated through them. CM called Pt's sister Darius @[phone]. Darius stated that the Pt will take his medication and then when he gets close to running out he won't say anything and then will go without medication. She stated that the Pt did apply for [place] Medicaid but was unable to get approve yet. She stated they are waiting for the approval and are supposed to get a letter in the mail stating that he is no longer getting insurance through [facility]. CM stated they will call with the Pt to see if they can get a letter faxed/emailed to them.

## 2023-07-13 NOTE — PROGRESS NOTES
07/13/23 0860   Team Meeting   Meeting Type Tx Team Meeting   Team Members Present   Team Members Present Physician;Nurse;; Other (Discipline and Name)   Physician Team Member Dr. Wanda [place] Team Member [facility] Management Team Member [place]   Patient/Family Present   Patient Present No  (Pt declined to meet with treatment team.)   Patient's Family Present No     Reviewed diagnosis of Schizophrenia. Discussed short term goals of decrease in depressive symptoms, decrease in anxiety symptoms, decrease in psychotic symptoms, improvement in insight, sleep improvement, improvement in appetite, mood stabilization. All parties are in agreement and treatment plan was signed. No discharge date set at this time.

## 2023-07-13 NOTE — NURSING NOTE
Pt resting in bed with eyes closed following lunch. Cooperative but scant during interaction. Denies any significant change in symptoms since yesterday. States mood and AH are the same. Flat affect. Denies SI/HI.

## 2023-07-13 NOTE — NURSING NOTE
Patient requested medication for insomnia, administered Trazodone 50mg at 2130. At this time patient is resting in bed without signs of distress, respirations regular and even. Medication appears effective.

## 2023-07-14 LAB — TREPONEMA PALLIDUM IGG+IGM AB [PRESENCE] IN SERUM OR PLASMA BY IMMUNOASSAY: NORMAL

## 2023-07-14 PROCEDURE — 99232 SBSQ HOSP IP/OBS MODERATE 35: CPT | Performed by: STUDENT IN AN ORGANIZED HEALTH CARE EDUCATION/TRAINING PROGRAM

## 2023-07-14 RX ADMIN — HALOPERIDOL 10 MG: 10 TABLET ORAL at 17:05

## 2023-07-14 RX ADMIN — ESCITALOPRAM OXALATE 10 MG: 10 TABLET ORAL at 08:51

## 2023-07-14 RX ADMIN — HALOPERIDOL 10 MG: 10 TABLET ORAL at 08:51

## 2023-07-14 RX ADMIN — NICOTINE POLACRILEX 4 MG: 4 GUM, CHEWING BUCCAL at 13:45

## 2023-07-14 RX ADMIN — OLANZAPINE 7.5 MG: 2.5 TABLET, FILM COATED ORAL at 21:31

## 2023-07-14 RX ADMIN — MIRTAZAPINE 15 MG: 15 TABLET, FILM COATED ORAL at 21:31

## 2023-07-14 RX ADMIN — TRAZODONE HYDROCHLORIDE 50 MG: 50 TABLET ORAL at 21:31

## 2023-07-14 RX ADMIN — NICOTINE POLACRILEX 4 MG: 4 GUM, CHEWING BUCCAL at 17:18

## 2023-07-14 NOTE — TREATMENT TEAM
07/14/23 1330   Activity/Group Checklist   Group Other (Comment)  (art therapy)   Attendance Attended   Attendance Duration (min) 46-60   Interactions Interacted appropriately   Affect/Mood Appropriate   Goals Achieved Able to listen to others; Able to engage in interactions; Other (Comment)  (authentic spontaneous self expression, connection, and insight)

## 2023-07-14 NOTE — PROGRESS NOTES
[facility] 33 [facility] MRN: [id number]  Unit/Bed#: [facility] 216-01 Encounter: [id number]    Assessment/Plan   Principal Problem:    Schizophrenia [facility])  Active Problems:    Medical clearance for psychiatric admission    Tobacco abuse    T wave inversion in EKG      Subjective: Patient was seen, chart was reviewed, and case was discussed with the team. Patient appears withdrawn, unkempt, soft, scant and depressed. He continues to endorse depressed mood, lack of motivation, tiredness and negative voices. Sleep and appetite are ok. He is compliant with medications. Denies any side effects.    Behavior over the last 24 hours:  unchanged  Sleep: normal  Appetite: normal  Medication side effects: No    Medical ROS: Pertinent items are noted in HPI.all other systems are negative    Current Medications:  Current Facility-Administered Medications   Medication Dose Route Frequency   • acetaminophen (TYLENOL) tablet 650 mg  650 mg Oral Q6H PRN   • acetaminophen (TYLENOL) tablet 650 mg  650 mg Oral Q4H PRN   • acetaminophen (TYLENOL) tablet 975 mg  975 mg Oral Q6H PRN   • aluminum-magnesium hydroxide-simethicone (MAALOX) oral suspension 30 mL  30 mL Oral Q4H PRN   • haloperidol lactate (HALDOL) injection 2.5 mg  2.5 mg Intramuscular Q4H PRN Max 4/day    And   • LORazepam (ATIVAN) injection 1 mg  1 mg Intramuscular Q4H PRN Max 4/day    And   • benztropine (COGENTIN) injection 0.5 mg  0.5 mg Intramuscular Q4H PRN Max 4/day   • haloperidol lactate (HALDOL) injection 5 mg  5 mg Intramuscular Q4H PRN Max 4/day    And   • LORazepam (ATIVAN) injection 2 mg  2 mg Intramuscular Q4H PRN Max 4/day    And   • benztropine (COGENTIN) injection 1 mg  1 mg Intramuscular Q4H PRN Max 4/day   • benztropine (COGENTIN) tablet 1 mg  1 mg Oral Q4H PRN Max 6/day   • bisacodyl (DULCOLAX) rectal suppository 10 mg  10 mg Rectal Daily PRN   • hydrOXYzine HCL (ATARAX) tablet 50 mg  50 mg Oral Q6H PRN Max 4/day    Or   • diphenhydrAMINE (BENADRYL) injection 50 mg  50 mg Intramuscular Q6H PRN   • escitalopram (LEXAPRO) tablet 10 mg  10 mg Oral Daily   • haloperidol (HALDOL) tablet 1 mg  1 mg Oral Q6H PRN   • haloperidol (HALDOL) tablet 10 mg  10 mg Oral BID   • haloperidol (HALDOL) tablet 2.5 mg  2.5 mg Oral Q4H PRN Max 4/day   • haloperidol (HALDOL) tablet 5 mg  5 mg Oral Q4H PRN Max 4/day   • hydrOXYzine HCL (ATARAX) tablet 100 mg  100 mg Oral Q6H PRN Max 4/day    Or   • LORazepam (ATIVAN) injection 2 mg  2 mg Intramuscular Q6H PRN   • hydrOXYzine HCL (ATARAX) tablet 25 mg  25 mg Oral Q6H PRN Max 4/day   • mirtazapine (REMERON) tablet 15 mg  15 mg Oral HS   • nicotine polacrilex (NICORETTE) gum 4 mg  4 mg Oral Q2H PRN   • OLANZapine (ZyPREXA) tablet 7.5 mg  7.5 mg Oral HS   • polyethylene glycol (MIRALAX) packet 17 g  17 g Oral Daily PRN   • senna-docusate sodium (SENOKOT S) 8.6-50 mg per tablet 1 tablet  1 tablet Oral Daily PRN   • traZODone (DESYREL) tablet 50 mg  50 mg Oral HS PRN       Behavioral Health Medications:   all current active meds have been reviewed. Vitals:  Vitals:    07/14/23 0700   BP: 127/89   Pulse: 90   Resp: 16   Temp: 98 °F (36.7 °C)   SpO2:        Laboratory results:    I have personally reviewed all pertinent laboratory/tests results. Mental Status Evaluation:    Appearance:  disheveled, older than stated age and overweight   Behavior:  guarded   Speech:  delayed, soft and scant   Mood:  depressed   Affect:  constricted and flat   Thought Process:  goal directed and slow   Thought Content:  normal   Perceptual Disturbances:  Auditory hallucinations without commands   Risk Potential: Suicidal Ideations none  Homicidal Ideations none  Potential for Aggression No   Sensorium:  person, place and time/date   Memory:  recent and remote memory grossly intact   Consciousness:  alert and awake    Attention: attention span appeared shorter than expected for age   Insight:  limited   Judgment: limited Gait/Station: normal gait/station   Motor Activity: no abnormal movements       Progress Toward Goals: Progressing    Recommended Treatment: Continue with pharmacotherapy, group therapy, milieu therapy and occupational therapy. 1.Increase Zyprexa to 7.5 mg PO HS  2. Increase Lexapro in coming days  Risks, benefits and possible side effects of Medications:   Risks, benefits, alternatives, and possible side effects of patient's psychiatric medications were discussed with patient.

## 2023-07-14 NOTE — NURSING NOTE
Pt more visible and social throughout afternoon/evening. Pt reports distraction is helpful, but AH continue. Appears with depressed affect, reports elevated depression. Does not appear to be RIS when observed on unit. Denies SI, HI, VH. Pt playing cards with peers this evening.

## 2023-07-14 NOTE — NURSING NOTE
Patient appeared to sleep well. Observed resting in bed with eyes closed, respirations regular, even and non-labored, throughout the night. No signs of distress, arouses easily. Continuous rounding maintained throughout shift for patient safety.

## 2023-07-14 NOTE — PLAN OF CARE
Problem: Alteration in Thoughts and Perception  Goal: Treatment Goal: Gain control of psychotic behaviors/thinking, reduce/eliminate presenting symptoms and demonstrate improved reality functioning upon discharge  Outcome: Progressing  Goal: Verbalize thoughts and feelings  Description: Interventions:  - Promote a nonjudgmental and trusting relationship with the patient through active listening and therapeutic communication  - Assess patient's level of functioning, behavior and potential for risk  - Engage patient in 1 on 1 interactions  - Encourage patient to express fears, feelings, frustrations, and discuss symptoms    - [place] patient to reality, help patient recognize reality-based thinking   - Administer medications as ordered and assess for potential side effects  - Provide the patient education related to the signs and symptoms of the illness and desired effects of prescribed medications  Outcome: Progressing  Goal: Refrain from acting on delusional thinking/internal stimuli  Description: Interventions:  - Monitor patient closely, per order   - Utilize least restrictive measures   - Set reasonable limits, give positive feedback for acceptable   - Administer medications as ordered and monitor of potential side effects  Outcome: Progressing  Goal: Agree to be compliant with medication regime, as prescribed and report medication side effects  Description: Interventions:  - Offer appropriate PRN medication and supervise ingestion; conduct AIMS, as needed   Outcome: Progressing  Goal: Attend and participate in unit activities, including therapeutic, recreational, and educational groups  Description: Interventions:  -Encourage Visitation and family involvement in care  Outcome: Progressing  Goal: Recognize dysfunctional thoughts, communicate reality-based thoughts at the time of discharge  Description: Interventions:  - Provide medication and psycho-education to assist patient in compliance and developing Assign consistent caregivers   - [place]/re-orient patient as needed  - Allow comfort items, as appropriate  - Provide visual cues, signs, etc.   Outcome: Progressing  Goal: Allow medical examinations, as recommended  Description: Interventions:  - Provide physical/neurological exams and/or referrals, per provider   Outcome: Progressing  Goal: Cooperate with recommended testing/procedures  Description: Interventions:  - Determine need for ancillary testing  - Observe for mental status changes  - Implement falls/precaution protocol   Outcome: Progressing  Goal: Attend and participate in unit activities, including therapeutic, recreational, and educational groups  Description: Interventions:  - Provide therapeutic and educational activities daily, encourage attendance and participation, and document same in the medical record   - Provide appropriate opportunities for reminiscence   - Provide a consistent daily routine   - Encourage family contact/visitation   Outcome: Progressing  Goal: Complete daily ADLs, including personal hygiene independently, as able  Description: Interventions:  - Observe, teach, and assist patient with ADLS  Outcome: Progressing     Problem: DISCHARGE PLANNING - CARE MANAGEMENT  Goal: Discharge to post-acute care or home with appropriate resources  Description: INTERVENTIONS:  - Conduct assessment to determine patient/family and health care team treatment goals, and need for post-acute services based on payer coverage, community resources, and patient preferences, and barriers to discharge  - Address psychosocial, clinical, and financial barriers to discharge as identified in assessment in conjunction with the patient/family and health care team  - Arrange appropriate level of post-acute services according to patient’s   needs and preference and payer coverage in collaboration with the physician and health care team  - Communicate with and update the patient/family, physician, and health care team regarding progress on the discharge plan  - Arrange appropriate transportation to post-acute venues  Outcome: Progressing

## 2023-07-14 NOTE — NURSING NOTE
Pt has been visible in milieu, observed socializing with peers, playing cards and games. Pt is scant in conversation. Denies all psychiatrist symptoms. Remained free of abnormal behaviors and overt delusions.

## 2023-07-14 NOTE — NURSING NOTE
Pt requested and received PRN trazodone 50 mg PO for insomnia at 2115. Upon follow up pt appears to be resting comfortably in bed with eyes closed. This nurse did not disturb pt. Respirations even unlabored.

## 2023-07-14 NOTE — NURSING NOTE
Pt resting in bed during interaction. Reports sleeping well last night, showered last evening as well. OOB for breakfast and then returning to room. Reports ongoing AH, has AH at baseline as well. Depressed this morning, anxiety is not bothersome. Denies questions or concerns at this time. Reinforced nurse availability. Encouraged OOB to attend groups.

## 2023-07-15 PROCEDURE — 99232 SBSQ HOSP IP/OBS MODERATE 35: CPT | Performed by: PSYCHIATRY & NEUROLOGY

## 2023-07-15 RX ADMIN — HALOPERIDOL 10 MG: 10 TABLET ORAL at 17:47

## 2023-07-15 RX ADMIN — TRAZODONE HYDROCHLORIDE 50 MG: 50 TABLET ORAL at 21:21

## 2023-07-15 RX ADMIN — OLANZAPINE 7.5 MG: 2.5 TABLET, FILM COATED ORAL at 21:20

## 2023-07-15 RX ADMIN — MIRTAZAPINE 15 MG: 15 TABLET, FILM COATED ORAL at 21:20

## 2023-07-15 RX ADMIN — HALOPERIDOL 10 MG: 10 TABLET ORAL at 09:00

## 2023-07-15 RX ADMIN — ESCITALOPRAM OXALATE 10 MG: 10 TABLET ORAL at 09:00

## 2023-07-15 RX ADMIN — NICOTINE POLACRILEX 4 MG: 4 GUM, CHEWING BUCCAL at 18:37

## 2023-07-15 NOTE — PROGRESS NOTES
Progress Note - [facility] 33 y.o. male MRN: [id number]  Unit/Bed#: [facility] 216-01 Encounter: [id number]    Assessment/Plan   Principal Problem:    Schizophrenia [facility])  Active Problems:    Medical clearance for psychiatric admission    Tobacco abuse    T wave inversion in EKG      Recommended Treatment:   No psychopharmacologic changes necessary at this moment; will continue to assess daily for further optimization. Continue with pharmacotherapy, group therapy, milieu therapy and occupational therapy. Continue to assess for adverse medication side effects. Encourage Russ Moran to participate in nonverbal forms of therapy including journaling and art/music therapy. Continue frequent safety checks and vitals per unit protocol. Continue to engage CM/SW to assist with collateral, disposition planning, and the implementation of an individualized, patient-centered plan of care. Continue medical management by medical team.    Discharge disposition:  May benefit from ACT team disposition, pending case management  Case discussed with treatment team.    ------------------------------------------------------------    Subjective: All documentation including nursing notes, medication history to ensure medication adherence on the unit, labs, and vitals were reviewed. Jorge Bashir was evaluated this morning for continuity of care and no acute distress noted throughout the evaluation. Over the past 24 hours, staff noted the patient has been cooperative on the unit and compliant with medications with no acute events overnight. Nursing reported patient has been denying suicidal thoughts, voices are quieting, tolerated increase in Zyprexa, more social and going to groups.     Today on exam, the patient was seen in his room and reports "doing okay."  Patient reported no questions or concerns, denied any pain, slept well, denied any side effects to medications, reported good appetite, stated he does notice a mild bilateral hand tremor which he believes are due to the medications but admits they were occurring prior to hospitalization. Patient reports at times experiencing auditory hallucinations stating that they say "they want to kill me". Patient reports no command auditory hallucinations to harm self, adamantly denies any SI/HI. Today, Cherry Jauregui is consenting for safety on the unit. Progress Toward Goals: slow improvement    Psychiatric Review of Systems:  Behavior over the last 24 hours:  unchanged  Sleep: normal  Appetite: normal  Medication side effects: No   ROS: reports mild hand tremor, all other systems are negative    Vital signs in last 24 hours:  Temp:  [97.8 °F (36.6 °C)-98.2 °F (36.8 °C)] 98.2 °F (36.8 °C)  HR:  [72-78] 72  Resp:  [18] 18  BP: (129-155)/(87-91) 155/91    Laboratory results:  I have personally reviewed all pertinent laboratory/tests results. No results found for this or any previous visit (from the past 48 hour(s)).       Mental Status Evaluation:    Appearance:  age appropriate, casually dressed, marginal hygiene, looks older than stated age, overweight   Behavior:  pleasant, cooperative, calm, limited eye contact   Speech:  scant, delayed, soft, perseverative   Mood:  "Doing okay"   Affect:  blunted   Thought Process:  logical, coherent, goal directed, linear, decreased rate of thoughts   Associations: intact associations   Thought Content:  no overt delusions   Perceptual Disturbances: Reports Auditory hallucinations see "they want to kill me" denied command auditory hallucinations to harm self, Appears to be internally preoccupied and Does not appear to be responding to internal stimuli   Risk Potential: Suicidal ideation - None at present  Homicidal ideation - None at present  Potential for aggression - No   Sensorium:  oriented to person, place, time/date and situation   Memory:  recent and remote memory grossly intact   Consciousness:  alert and awake   Attention/Concentration: attention span and concentration appear shorter than expected for age   Insight:  limited   Judgment: limited   Gait/Station: normal balance, in bed   Motor Activity: abnormal movement noted: minimal hand tremor present L>R       Current Medications:  Current Facility-Administered Medications   Medication Dose Route Frequency Provider Last Rate   • acetaminophen  650 mg Oral Q6H PRN Le Dobbins MD     • acetaminophen  650 mg Oral Q4H PRN Le Dobbins MD     • acetaminophen  975 mg Oral Q6H PRN Le Dobbins MD     • aluminum-magnesium hydroxide-simethicone  30 mL Oral Q4H PRN Le Dobbins MD     • haloperidol lactate  2.5 mg Intramuscular Q4H PRN Max 4/day Le Dobbins MD      And   • LORazepam  1 mg Intramuscular Q4H PRN Max 4/day Le Dobbins MD      And   • benztropine  0.5 mg Intramuscular Q4H PRN Max 4/day Le Dobbins MD     • haloperidol lactate  5 mg Intramuscular Q4H PRN Max 4/day Le Dobbins MD      And   • LORazepam  2 mg Intramuscular Q4H PRN Max 4/day Le Dobbins MD      And   • benztropine  1 mg Intramuscular Q4H PRN Max 4/day Le Dobbins MD     • benztropine  1 mg Oral Q4H PRN Max 6/day Le Dobbins MD     • bisacodyl  10 mg Rectal Daily PRN Le Dobbins MD     • hydrOXYzine HCL  50 mg Oral Q6H PRN Max 4/day Le Dobbins MD      Or   • diphenhydrAMINE  50 mg Intramuscular Q6H PRN Le Dobbins MD     • escitalopram  10 mg Oral Daily Le Dobbins MD     • haloperidol  1 mg Oral Q6H PRN Le Dobbins MD     • haloperidol  10 mg Oral BID Le Dobbins MD     • haloperidol  2.5 mg Oral Q4H PRN Max 4/day Le Dobbins MD     • haloperidol  5 mg Oral Q4H PRN Max 4/day Le Dobbins MD     • hydrOXYzine HCL  100 mg Oral Q6H PRN Max 4/day Le Dobbins MD      Or   • LORazepam  2 mg Intramuscular Q6H PRN Le Dobbins MD     • hydrOXYzine HCL  25 mg Oral Q6H PRN Max 4/day Le Dobbins MD     • mirtazapine 15 mg Oral HS Jenni Sinha MD     • nicotine polacrilex  4 mg Oral Q2H PRN Jenni Sinha MD     • OLANZapine  7.5 mg Oral HS Aydin Elam MD     • polyethylene glycol  17 g Oral Daily PRN Jenni Sinha MD     • senna-docusate sodium  1 tablet Oral Daily PRN Jenni Sinha MD     • traZODone  50 mg Oral HS PRN Jenni Sinha MD         Behavioral Health Medications: All current active meds have been reviewed. Changes as in plan section above. Risks, benefits and possible side effects of Medications:   Risks, benefits, and possible side effects of medications explained to patient and patient verbalizes understanding. Pablo Reich DO 07/15/23  Psychiatry Resident, PGY-IV    This note was completed in part utilizing [employer] Software. Grammatical, translation, syntax errors, random word insertions, spelling mistakes, and incomplete sentences may be an occasional consequence of this system secondary to software limitations with voice recognition, ambient noise, and hardware issues. If you have any questions or concerns about the content, text, or information contained within the body of this dictation, please contact the provider for clarification.

## 2023-07-15 NOTE — PLAN OF CARE
Problem: Alteration in Thoughts and Perception  Goal: Treatment Goal: Gain control of psychotic behaviors/thinking, reduce/eliminate presenting symptoms and demonstrate improved reality functioning upon discharge  Outcome: Progressing  Goal: Verbalize thoughts and feelings  Description: Interventions:  - Promote a nonjudgmental and trusting relationship with the patient through active listening and therapeutic communication  - Assess patient's level of functioning, behavior and potential for risk  - Engage patient in 1 on 1 interactions  - Encourage patient to express fears, feelings, frustrations, and discuss symptoms    - [place] patient to reality, help patient recognize reality-based thinking   - Administer medications as ordered and assess for potential side effects  - Provide the patient education related to the signs and symptoms of the illness and desired effects of prescribed medications  Outcome: Progressing  Goal: Refrain from acting on delusional thinking/internal stimuli  Description: Interventions:  - Monitor patient closely, per order   - Utilize least restrictive measures   - Set reasonable limits, give positive feedback for acceptable   - Administer medications as ordered and monitor of potential side effects  Outcome: Progressing  Goal: Agree to be compliant with medication regime, as prescribed and report medication side effects  Description: Interventions:  - Offer appropriate PRN medication and supervise ingestion; conduct AIMS, as needed   Outcome: Progressing  Goal: Attend and participate in unit activities, including therapeutic, recreational, and educational groups  Description: Interventions:  -Encourage Visitation and family involvement in care  Outcome: Progressing  Goal: Recognize dysfunctional thoughts, communicate reality-based thoughts at the time of discharge  Description: Interventions:  - Provide medication and psycho-education to assist patient in compliance and developing insight into his/her illness   Outcome: Progressing  Goal: Complete daily ADLs, including personal hygiene independently, as able  Description: Interventions:  - Observe, teach, and assist patient with ADLS  - Monitor and promote a balance of rest/activity, with adequate nutrition and elimination   Outcome: Progressing     Problem: Ineffective Coping  Goal: Identifies ineffective coping skills  Outcome: Progressing  Goal: Identifies healthy coping skills  Outcome: Progressing  Goal: Demonstrates healthy coping skills  Outcome: Progressing  Goal: Participates in unit activities  Description: Interventions:  - Provide therapeutic environment   - Provide required programming   - Redirect inappropriate behaviors   Outcome: Progressing  Goal: Patient/Family participate in treatment and DC plans  Description: Interventions:  - Provide therapeutic environment  Outcome: Progressing  Goal: Patient/Family verbalizes awareness of resources  Outcome: Progressing  Goal: Understands least restrictive measures  Description: Interventions:  - Utilize least restrictive behavior  Outcome: Progressing  Goal: Free from restraint events  Description: - Utilize least restrictive measures   - Provide behavioral interventions   - Redirect inappropriate behaviors   Outcome: Progressing     Problem: Alteration in Orientation  Goal: Treatment Goal: Demonstrate a reduction of confusion and improved orientation to person, place, time and/or situation upon discharge, according to optimum baseline/ability  Outcome: Progressing  Goal: Interact with staff daily  Description: Interventions:  - Assess and re-assess patient's level of orientation  - Engage patient in 1 on 1 interactions, daily, for a minimum of 15 minutes   - Establish rapport/trust with patient   Outcome: Progressing  Goal: Express concerns related to confused thinking related to:  Description: Interventions:  - Encourage patient to express feelings, fears, frustrations, hopes  - Assign consistent caregivers   - [place]/re-orient patient as needed  - Allow comfort items, as appropriate  - Provide visual cues, signs, etc.   Outcome: Progressing  Goal: Allow medical examinations, as recommended  Description: Interventions:  - Provide physical/neurological exams and/or referrals, per provider   Outcome: Progressing  Goal: Cooperate with recommended testing/procedures  Description: Interventions:  - Determine need for ancillary testing  - Observe for mental status changes  - Implement falls/precaution protocol   Outcome: Progressing  Goal: Attend and participate in unit activities, including therapeutic, recreational, and educational groups  Description: Interventions:  - Provide therapeutic and educational activities daily, encourage attendance and participation, and document same in the medical record   - Provide appropriate opportunities for reminiscence   - Provide a consistent daily routine   - Encourage family contact/visitation   Outcome: Progressing  Goal: Complete daily ADLs, including personal hygiene independently, as able  Description: Interventions:  - Observe, teach, and assist patient with ADLS  Outcome: Progressing     Problem: HEMATOLOGIC - ADULT  Goal: Maintains hematologic stability  Description: INTERVENTIONS  - Assess for signs and symptoms of bleeding or hemorrhage  - Monitor labs  - Administer supportive blood products/factors as ordered and appropriate  Outcome: Progressing     Problem: DISCHARGE PLANNING - CARE MANAGEMENT  Goal: Discharge to post-acute care or home with appropriate resources  Description: INTERVENTIONS:  - Conduct assessment to determine patient/family and health care team treatment goals, and need for post-acute services based on payer coverage, community resources, and patient preferences, and barriers to discharge  - Address psychosocial, clinical, and financial barriers to discharge as identified in assessment in conjunction with the patient/family and health care team  - Arrange appropriate level of post-acute services according to patient’s   needs and preference and payer coverage in collaboration with the physician and health care team  - Communicate with and update the patient/family, physician, and health care team regarding progress on the discharge plan  - Arrange appropriate transportation to post-acute venues  Outcome: Progressing

## 2023-07-15 NOTE — NURSING NOTE
Pt has been withdrawn to room, sleeping throughout much of the day. OOB for meals, meds. Reports continued AH and depression. Reinforced staff availability.

## 2023-07-15 NOTE — NURSING NOTE
Pt napping unitl dinner. OOB for meal and evening meds. Pt has since been visible and social with peers. Reports continued AH, no worse/better. Depressed affect.  Denies SI, HI, VH

## 2023-07-15 NOTE — TREATMENT TEAM
07/14/23 0750   Team Meeting   Meeting Type Daily Rounds   Team Members Present   Team Members Present Physician;Nurse;; Other (Discipline and Name)   Physician Team Member Dr. Tami Jones / Dr. Angeles Mendieta / Bernardo Young / Elizabeth Dykes Team Member [facility] Management Team Member [place] / Lacey Lima   Other (Discipline and Name) Carmita Hensley (art therapist)   Patient/Family Present   Patient Present No   Patient's Family Present No       AM rounds- denies SI/HI, reports improved AH. Social. Compliant with meds. Slept well.

## 2023-07-15 NOTE — NURSING NOTE
Patient requested and took Prn trazodone 50 mg PO for sleep at 2131. patient appears to have slept the night. Observed to be resting in bed with eyes closed, respirations even and unlabored. No s/s of distress. Q 7.5 minute checks ongoing for patient safety.

## 2023-07-16 PROCEDURE — 99232 SBSQ HOSP IP/OBS MODERATE 35: CPT | Performed by: PSYCHIATRY & NEUROLOGY

## 2023-07-16 RX ORDER — BENZTROPINE MESYLATE 0.5 MG/1
0.5 TABLET ORAL 2 TIMES DAILY
Status: DISCONTINUED | OUTPATIENT
Start: 2023-07-16 | End: 2023-08-15 | Stop reason: HOSPADM

## 2023-07-16 RX ADMIN — TRAZODONE HYDROCHLORIDE 50 MG: 50 TABLET ORAL at 21:36

## 2023-07-16 RX ADMIN — HALOPERIDOL 10 MG: 10 TABLET ORAL at 18:26

## 2023-07-16 RX ADMIN — OLANZAPINE 7.5 MG: 2.5 TABLET, FILM COATED ORAL at 21:35

## 2023-07-16 RX ADMIN — MIRTAZAPINE 15 MG: 15 TABLET, FILM COATED ORAL at 21:36

## 2023-07-16 RX ADMIN — NICOTINE POLACRILEX 4 MG: 4 GUM, CHEWING BUCCAL at 17:41

## 2023-07-16 RX ADMIN — BENZTROPINE MESYLATE 0.5 MG: 0.5 TABLET ORAL at 18:26

## 2023-07-16 RX ADMIN — BENZTROPINE MESYLATE 0.5 MG: 0.5 TABLET ORAL at 12:06

## 2023-07-16 RX ADMIN — HALOPERIDOL 10 MG: 10 TABLET ORAL at 09:23

## 2023-07-16 RX ADMIN — ESCITALOPRAM OXALATE 10 MG: 10 TABLET ORAL at 09:23

## 2023-07-16 NOTE — TREATMENT TEAM
07/16/23 0900   Team Meeting   Meeting Type Daily Rounds   Team Members Present   Team Members Present Physician;Nurse   Physician Team Member Abril/Mary Jo   Nursing Team Member Cony   Patient/Family Present   Patient Present No   Patient's Family Present No       AM Rounds: depression, +AH, states they are baseline. Withdrawn, napping throughout morning, visible, social in evening. PRN Trazodone for sleep.

## 2023-07-16 NOTE — NURSING NOTE
Pt received Trazodone 50 mg PO at 2121 for sleep. PRN effective, pt appears to be sleeping on reassessment.

## 2023-07-16 NOTE — NURSING NOTE
Pt reports mood and anxiety are improved, denies SI/HI. His AH are improved since yesterday. He is sleeping and eating well. He reports he had tremors previously but denies tremors since starting scheduled Cogentin, denies other side effects or concerns. Pleasant in conversation, out in milieu and social with peers throughout shift, cooperative.

## 2023-07-16 NOTE — NURSING NOTE
Patient resting comfortably in bed. Patient reports "sleeping well" last night and was out of the room for breakfast. Has continuing AH that are not worsening. Denies SI,HI, VH.

## 2023-07-17 PROCEDURE — 99232 SBSQ HOSP IP/OBS MODERATE 35: CPT | Performed by: STUDENT IN AN ORGANIZED HEALTH CARE EDUCATION/TRAINING PROGRAM

## 2023-07-17 RX ADMIN — ESCITALOPRAM OXALATE 10 MG: 10 TABLET ORAL at 09:09

## 2023-07-17 RX ADMIN — OLANZAPINE 7.5 MG: 2.5 TABLET, FILM COATED ORAL at 21:35

## 2023-07-17 RX ADMIN — HALOPERIDOL 10 MG: 10 TABLET ORAL at 17:22

## 2023-07-17 RX ADMIN — MIRTAZAPINE 15 MG: 15 TABLET, FILM COATED ORAL at 21:35

## 2023-07-17 RX ADMIN — NICOTINE POLACRILEX 4 MG: 4 GUM, CHEWING BUCCAL at 18:27

## 2023-07-17 RX ADMIN — HALOPERIDOL 10 MG: 10 TABLET ORAL at 09:09

## 2023-07-17 RX ADMIN — TRAZODONE HYDROCHLORIDE 50 MG: 50 TABLET ORAL at 21:35

## 2023-07-17 RX ADMIN — BENZTROPINE MESYLATE 0.5 MG: 0.5 TABLET ORAL at 09:09

## 2023-07-17 RX ADMIN — BENZTROPINE MESYLATE 0.5 MG: 0.5 TABLET ORAL at 17:22

## 2023-07-17 NOTE — NURSING NOTE
Pt received Trazodone 50 mg PO at 2136 for sleep. PRN effective, pt appears to be sleeping on reassessment.

## 2023-07-17 NOTE — PROGRESS NOTES
Attended group alongside other people on the unit and explored their own experience with mental health recovery and personal growth.      07/17/23 1700   Activity/Group Checklist   Group Other (Comment)  (5:00 PM 12 Step Group)   Attendance Attended   Attendance Duration (min) 0-15  (Arrived late, stayed until end of group)   Interactions Interacted appropriately   Affect/Mood Appropriate   Goals Achieved Other (Comment)  (Engaged with CPS and others by practicing mutual support and validation during reading of 12 steps and sharing of lived experience of addiction impacting lives.)

## 2023-07-17 NOTE — PROGRESS NOTES
Attended group alongside other people on the unit, participated in discussion around recovery-centered topic, and contributed their own lived experience toward connection between group members. 07/14/23 1230   Activity/Group Checklist   Group Other (Comment)  (12:30 PM [employer], 3:30 PM Goal Accomplishments, 5:30 PM Structured Journaling Groups)   Attendance Attended   Attendance Duration (min) Greater than 60  (> 150 min)   Interactions Interacted appropriately   Affect/Mood Appropriate   Goals Achieved Other (Comment)  (Engaged with CPS and others to explore facts about 15 lesser known mental health conditions. Built pride around nine different types of intelligence, self awareness, and connection.  Discovered 10 types of journaling to share lived experience in recovery.)

## 2023-07-17 NOTE — PROGRESS NOTES
Progress Note - [facility] 33 y.o. male MRN: [id number]  Unit/Bed#: [facility] 216-01 Encounter: [id number]    Assessment/Plan   Principal Problem:    Schizophrenia [facility])  Active Problems:    Medical clearance for psychiatric admission    Tobacco abuse    T wave inversion in EKG      Subjective: Patient was seen, chart was reviewed, and case was discussed with the team. As per report no behavioral issues over the weekend. Cogentin was started for EPS. Patient appears withdrawn, isolative, scant, soft and depressed. He continues to endorse depressed mood, paranoia and voices threatening to hurt him. Sleep and appetite ar ok. He is compliant with medications. Endorses mild shakes on left hand. Denies any other side effects.    Behavior over the last 24 hours:  unchanged  Sleep: normal  Appetite: normal  Medication side effects: Yes tremors    Medical ROS: Pertinent items are noted in HPI.all other systems are negative    Current Medications:  Current Facility-Administered Medications   Medication Dose Route Frequency   • acetaminophen (TYLENOL) tablet 650 mg  650 mg Oral Q6H PRN   • acetaminophen (TYLENOL) tablet 650 mg  650 mg Oral Q4H PRN   • acetaminophen (TYLENOL) tablet 975 mg  975 mg Oral Q6H PRN   • aluminum-magnesium hydroxide-simethicone (MAALOX) oral suspension 30 mL  30 mL Oral Q4H PRN   • haloperidol lactate (HALDOL) injection 2.5 mg  2.5 mg Intramuscular Q4H PRN Max 4/day    And   • LORazepam (ATIVAN) injection 1 mg  1 mg Intramuscular Q4H PRN Max 4/day    And   • benztropine (COGENTIN) injection 0.5 mg  0.5 mg Intramuscular Q4H PRN Max 4/day   • haloperidol lactate (HALDOL) injection 5 mg  5 mg Intramuscular Q4H PRN Max 4/day    And   • LORazepam (ATIVAN) injection 2 mg  2 mg Intramuscular Q4H PRN Max 4/day    And   • benztropine (COGENTIN) injection 1 mg  1 mg Intramuscular Q4H PRN Max 4/day   • benztropine (COGENTIN) tablet 0.5 mg  0.5 mg Oral BID   • benztropine (COGENTIN) tablet 1 mg 1 mg Oral Q4H PRN Max 6/day   • bisacodyl (DULCOLAX) rectal suppository 10 mg  10 mg Rectal Daily PRN   • hydrOXYzine HCL (ATARAX) tablet 50 mg  50 mg Oral Q6H PRN Max 4/day    Or   • diphenhydrAMINE (BENADRYL) injection 50 mg  50 mg Intramuscular Q6H PRN   • escitalopram (LEXAPRO) tablet 10 mg  10 mg Oral Daily   • haloperidol (HALDOL) tablet 1 mg  1 mg Oral Q6H PRN   • haloperidol (HALDOL) tablet 10 mg  10 mg Oral BID   • haloperidol (HALDOL) tablet 2.5 mg  2.5 mg Oral Q4H PRN Max 4/day   • haloperidol (HALDOL) tablet 5 mg  5 mg Oral Q4H PRN Max 4/day   • hydrOXYzine HCL (ATARAX) tablet 100 mg  100 mg Oral Q6H PRN Max 4/day    Or   • LORazepam (ATIVAN) injection 2 mg  2 mg Intramuscular Q6H PRN   • hydrOXYzine HCL (ATARAX) tablet 25 mg  25 mg Oral Q6H PRN Max 4/day   • mirtazapine (REMERON) tablet 15 mg  15 mg Oral HS   • nicotine polacrilex (NICORETTE) gum 4 mg  4 mg Oral Q2H PRN   • OLANZapine (ZyPREXA) tablet 7.5 mg  7.5 mg Oral HS   • polyethylene glycol (MIRALAX) packet 17 g  17 g Oral Daily PRN   • senna-docusate sodium (SENOKOT S) 8.6-50 mg per tablet 1 tablet  1 tablet Oral Daily PRN   • traZODone (DESYREL) tablet 50 mg  50 mg Oral HS PRN       Behavioral Health Medications:   all current active meds have been reviewed. Vitals:  Vitals:    07/17/23 0805   BP: 126/76   Pulse: 74   Resp: 15   Temp: 97.8 °F (36.6 °C)   SpO2:        Laboratory results:    I have personally reviewed all pertinent laboratory/tests results. Mental Status Evaluation:    Appearance:  age appropriate   Behavior:  guarded   Speech:  soft   Mood:  anxious and depressed   Affect:  constricted, flat and mood-congruent   Thought Process:  slow   Thought Content:  paranoia   Perceptual Disturbances:  Auditory hallucinations without commands   Risk Potential: Suicidal Ideations none  Homicidal Ideations none  Potential for Aggression No   Sensorium:  person, place and time/date   Memory:  recent and remote memory grossly intact Consciousness:  alert and awake    Attention: attention span appeared shorter than expected for age   Insight:  limited   Judgment: limited   Gait/Station: normal gait/station   Motor Activity: no abnormal movements       Progress Toward Goals: Progressing    Recommended Treatment: Continue with pharmacotherapy, group therapy, milieu therapy and occupational therapy. Risks, benefits and possible side effects of Medications:   Risks, benefits, alternatives, and possible side effects of patient's psychiatric medications were discussed with patient.

## 2023-07-17 NOTE — PROGRESS NOTES
07/17/23 0750   Team Meeting   Meeting Type Daily Rounds   Team Members Present   Team Members Present Physician;Nurse;; Other (Discipline and Name)   Physician Team Member Dr. Marcial Mead / Dr. Donato Baron / Leslie Sanders / Rolly Kearney / Jaspreet Deleon Member Luis Babb / LINDSAY [facility] Management Team Member [place] / Tiana Rey / Francisco Arnold / Nneka Links   Other (Discipline and Name) Rey Mo (art therapist)   Patient/Family Present   Patient Present No   Patient's Family Present No     Status: Pt is reported to be having improved mood. Pt is reported to be denying SI and HI. Pt is reported to have improved AH. Pt is reported to be pleasant and cooperative. Pt is reported to have slept through the night. Medication: No medication changes at this time. Pt received PRN Trazodone 50 mg for sleep. D/C: No discharge date set at this time.

## 2023-07-17 NOTE — PLAN OF CARE
Problem: Alteration in Thoughts and Perception  Goal: Treatment Goal: Gain control of psychotic behaviors/thinking, reduce/eliminate presenting symptoms and demonstrate improved reality functioning upon discharge  Outcome: Progressing  Goal: Verbalize thoughts and feelings  Description: Interventions:  - Promote a nonjudgmental and trusting relationship with the patient through active listening and therapeutic communication  - Assess patient's level of functioning, behavior and potential for risk  - Engage patient in 1 on 1 interactions  - Encourage patient to express fears, feelings, frustrations, and discuss symptoms    - [place] patient to reality, help patient recognize reality-based thinking   - Administer medications as ordered and assess for potential side effects  - Provide the patient education related to the signs and symptoms of the illness and desired effects of prescribed medications  Outcome: Progressing  Goal: Refrain from acting on delusional thinking/internal stimuli  Description: Interventions:  - Monitor patient closely, per order   - Utilize least restrictive measures   - Set reasonable limits, give positive feedback for acceptable   - Administer medications as ordered and monitor of potential side effects  Outcome: Progressing  Goal: Agree to be compliant with medication regime, as prescribed and report medication side effects  Description: Interventions:  - Offer appropriate PRN medication and supervise ingestion; conduct AIMS, as needed   Outcome: Progressing  Goal: Attend and participate in unit activities, including therapeutic, recreational, and educational groups  Description: Interventions:  -Encourage Visitation and family involvement in care  Outcome: Progressing  Goal: Recognize dysfunctional thoughts, communicate reality-based thoughts at the time of discharge  Description: Interventions:  - Provide medication and psycho-education to assist patient in compliance and developing insight into his/her illness   Outcome: Progressing  Goal: Complete daily ADLs, including personal hygiene independently, as able  Description: Interventions:  - Observe, teach, and assist patient with ADLS  - Monitor and promote a balance of rest/activity, with adequate nutrition and elimination   Outcome: Progressing     Problem: Ineffective Coping  Goal: Identifies ineffective coping skills  Outcome: Progressing  Goal: Identifies healthy coping skills  Outcome: Progressing  Goal: Demonstrates healthy coping skills  Outcome: Progressing  Goal: Participates in unit activities  Description: Interventions:  - Provide therapeutic environment   - Provide required programming   - Redirect inappropriate behaviors   Outcome: Progressing  Goal: Patient/Family participate in treatment and DC plans  Description: Interventions:  - Provide therapeutic environment  Outcome: Progressing  Goal: Patient/Family verbalizes awareness of resources  Outcome: Progressing  Goal: Understands least restrictive measures  Description: Interventions:  - Utilize least restrictive behavior  Outcome: Progressing  Goal: Free from restraint events  Description: - Utilize least restrictive measures   - Provide behavioral interventions   - Redirect inappropriate behaviors   Outcome: Progressing     Problem: Alteration in Orientation  Goal: Treatment Goal: Demonstrate a reduction of confusion and improved orientation to person, place, time and/or situation upon discharge, according to optimum baseline/ability  Outcome: Progressing  Goal: Interact with staff daily  Description: Interventions:  - Assess and re-assess patient's level of orientation  - Engage patient in 1 on 1 interactions, daily, for a minimum of 15 minutes   - Establish rapport/trust with patient   Outcome: Progressing  Goal: Express concerns related to confused thinking related to:  Description: Interventions:  - Encourage patient to express feelings, fears, frustrations, hopes  - Assign consistent caregivers   - Colon/re-orient patient as needed  - Allow comfort items, as appropriate  - Provide visual cues, signs, etc.   Outcome: Progressing  Goal: Allow medical examinations, as recommended  Description: Interventions:  - Provide physical/neurological exams and/or referrals, per provider   Outcome: Progressing  Goal: Cooperate with recommended testing/procedures  Description: Interventions:  - Determine need for ancillary testing  - Observe for mental status changes  - Implement falls/precaution protocol   Outcome: Progressing  Goal: Attend and participate in unit activities, including therapeutic, recreational, and educational groups  Description: Interventions:  - Provide therapeutic and educational activities daily, encourage attendance and participation, and document same in the medical record   - Provide appropriate opportunities for reminiscence   - Provide a consistent daily routine   - Encourage family contact/visitation   Outcome: Progressing  Goal: Complete daily ADLs, including personal hygiene independently, as able  Description: Interventions:  - Observe, teach, and assist patient with ADLS  Outcome: Progressing     Problem: HEMATOLOGIC - ADULT  Goal: Maintains hematologic stability  Description: INTERVENTIONS  - Assess for signs and symptoms of bleeding or hemorrhage  - Monitor labs  - Administer supportive blood products/factors as ordered and appropriate  Outcome: Progressing

## 2023-07-17 NOTE — PLAN OF CARE
Problem: Alteration in Thoughts and Perception  Goal: Verbalize thoughts and feelings  Description: Interventions:  - Promote a nonjudgmental and trusting relationship with the patient through active listening and therapeutic communication  - Assess patient's level of functioning, behavior and potential for risk  - Engage patient in 1 on 1 interactions  - Encourage patient to express fears, feelings, frustrations, and discuss symptoms    - [place] patient to reality, help patient recognize reality-based thinking   - Administer medications as ordered and assess for potential side effects  - Provide the patient education related to the signs and symptoms of the illness and desired effects of prescribed medications  Outcome: Progressing     Problem: Ineffective Coping  Goal: Free from restraint events  Description: - Utilize least restrictive measures   - Provide behavioral interventions   - Redirect inappropriate behaviors   Outcome: Progressing

## 2023-07-17 NOTE — NURSING NOTE
Patient scant with conversation . He denies SI HI VH, he does admit to AHI.  Patient take naps during the day

## 2023-07-17 NOTE — NURSING NOTE
Nashsathish WilliamMount Blanchard is withdrawn to his room this evening, reports feeling tired. Patient denies current SI/HI/VH, endorses ongoing AH. Gabby Vences remains scant in conversation, but answers this writer's questions appropriately. Patient was encouraged to ambulate in halls and join peers for group, but politely declined. Gabby Vences was encouraged to seek staff with any questions and/or concerns, verbalized understanding.

## 2023-07-18 PROCEDURE — 99232 SBSQ HOSP IP/OBS MODERATE 35: CPT | Performed by: STUDENT IN AN ORGANIZED HEALTH CARE EDUCATION/TRAINING PROGRAM

## 2023-07-18 RX ORDER — MIRTAZAPINE 15 MG/1
30 TABLET, FILM COATED ORAL
Status: DISCONTINUED | OUTPATIENT
Start: 2023-07-18 | End: 2023-08-15 | Stop reason: HOSPADM

## 2023-07-18 RX ADMIN — ESCITALOPRAM OXALATE 10 MG: 10 TABLET ORAL at 10:00

## 2023-07-18 RX ADMIN — HALOPERIDOL 10 MG: 10 TABLET ORAL at 17:24

## 2023-07-18 RX ADMIN — HALOPERIDOL 10 MG: 10 TABLET ORAL at 10:00

## 2023-07-18 RX ADMIN — BENZTROPINE MESYLATE 0.5 MG: 0.5 TABLET ORAL at 10:00

## 2023-07-18 RX ADMIN — OLANZAPINE 7.5 MG: 2.5 TABLET, FILM COATED ORAL at 21:23

## 2023-07-18 RX ADMIN — NICOTINE POLACRILEX 4 MG: 4 GUM, CHEWING BUCCAL at 15:39

## 2023-07-18 RX ADMIN — BENZTROPINE MESYLATE 0.5 MG: 0.5 TABLET ORAL at 17:24

## 2023-07-18 RX ADMIN — MIRTAZAPINE 30 MG: 15 TABLET, FILM COATED ORAL at 21:23

## 2023-07-18 RX ADMIN — TRAZODONE HYDROCHLORIDE 50 MG: 50 TABLET ORAL at 21:23

## 2023-07-18 NOTE — PROGRESS NOTES
[facility] Group Note     07/18/23 3587   Activity/Group Checklist   Group Anger management  (Anger Triggers and Coping Skills)   Attendance Attended   Attendance Duration (min) 31-45  (arrived late to group)   Interactions Interacted appropriately  (but reserved and verbally scant)   Affect/Mood Appropriate;Calm  (Focused and motivated)   Goals Achieved Identified feelings; Identified triggers; Able to listen to others; Able to engage in interactions; Able to reflect/comment on own behavior;Able to recieve feedback; Able to give feedback to another

## 2023-07-18 NOTE — PROGRESS NOTES
Progress Note - [facility] 33 y.o. male MRN: [id number]  Unit/Bed#: [facility] 216-01 Encounter: [id number]    Assessment/Plan   Principal Problem:    Schizophrenia ([address])  Active Problems:    Medical clearance for psychiatric admission    Tobacco abuse    T wave inversion in EKG      Subjective: Patient was seen, chart was reviewed, and case was discussed with the team. Patient appears withdrawn, isolative, soft, scant, no eye contact, malodorous, unkempt and depressed. He continues to endorse depressed mood and negative voices voices. Sleep and appetite are good. He is compliant with medications. Denies any side effects.    Behavior over the last 24 hours:  unchanged  Sleep: normal  Appetite: normal  Medication side effects: No    Medical ROS: Pertinent items are noted in HPI.all other systems are negative    Current Medications:  Current Facility-Administered Medications   Medication Dose Route Frequency   • acetaminophen (TYLENOL) tablet 650 mg  650 mg Oral Q6H PRN   • acetaminophen (TYLENOL) tablet 650 mg  650 mg Oral Q4H PRN   • acetaminophen (TYLENOL) tablet 975 mg  975 mg Oral Q6H PRN   • aluminum-magnesium hydroxide-simethicone (MAALOX) oral suspension 30 mL  30 mL Oral Q4H PRN   • haloperidol lactate (HALDOL) injection 2.5 mg  2.5 mg Intramuscular Q4H PRN Max 4/day    And   • LORazepam (ATIVAN) injection 1 mg  1 mg Intramuscular Q4H PRN Max 4/day    And   • benztropine (COGENTIN) injection 0.5 mg  0.5 mg Intramuscular Q4H PRN Max 4/day   • haloperidol lactate (HALDOL) injection 5 mg  5 mg Intramuscular Q4H PRN Max 4/day    And   • LORazepam (ATIVAN) injection 2 mg  2 mg Intramuscular Q4H PRN Max 4/day    And   • benztropine (COGENTIN) injection 1 mg  1 mg Intramuscular Q4H PRN Max 4/day   • benztropine (COGENTIN) tablet 0.5 mg  0.5 mg Oral BID   • benztropine (COGENTIN) tablet 1 mg  1 mg Oral Q4H PRN Max 6/day   • bisacodyl (DULCOLAX) rectal suppository 10 mg  10 mg Rectal Daily PRN   • hydrOXYzine HCL (ATARAX) tablet 50 mg  50 mg Oral Q6H PRN Max 4/day    Or   • diphenhydrAMINE (BENADRYL) injection 50 mg  50 mg Intramuscular Q6H PRN   • escitalopram (LEXAPRO) tablet 10 mg  10 mg Oral Daily   • haloperidol (HALDOL) tablet 1 mg  1 mg Oral Q6H PRN   • haloperidol (HALDOL) tablet 10 mg  10 mg Oral BID   • haloperidol (HALDOL) tablet 2.5 mg  2.5 mg Oral Q4H PRN Max 4/day   • haloperidol (HALDOL) tablet 5 mg  5 mg Oral Q4H PRN Max 4/day   • hydrOXYzine HCL (ATARAX) tablet 100 mg  100 mg Oral Q6H PRN Max 4/day    Or   • LORazepam (ATIVAN) injection 2 mg  2 mg Intramuscular Q6H PRN   • hydrOXYzine HCL (ATARAX) tablet 25 mg  25 mg Oral Q6H PRN Max 4/day   • mirtazapine (REMERON) tablet 30 mg  30 mg Oral HS   • nicotine polacrilex (NICORETTE) gum 4 mg  4 mg Oral Q2H PRN   • OLANZapine (ZyPREXA) tablet 7.5 mg  7.5 mg Oral HS   • polyethylene glycol (MIRALAX) packet 17 g  17 g Oral Daily PRN   • senna-docusate sodium (SENOKOT S) 8.6-50 mg per tablet 1 tablet  1 tablet Oral Daily PRN   • traZODone (DESYREL) tablet 50 mg  50 mg Oral HS PRN       Behavioral Health Medications:   all current active meds have been reviewed. Vitals:  Vitals:    07/18/23 0711   BP: 119/88   Pulse: 78   Resp: 16   Temp: 97.9 °F (36.6 °C)   SpO2: 99%       Laboratory results:    I have personally reviewed all pertinent laboratory/tests results. Mental Status Evaluation:    Appearance:  disheveled and older than stated age   Behavior:  guarded   Speech:  soft   Mood:  depressed   Affect:  constricted and flat   Thought Process:  slow   Thought Content:  normal   Perceptual Disturbances:  Auditory hallucinations without commands   Risk Potential: Suicidal Ideations none  Homicidal Ideations none  Potential for Aggression No   Sensorium:  person, place and time/date   Memory:  recent and remote memory grossly intact   Consciousness:  alert and awake    Attention: attention span appeared shorter than expected for age   Insight: limited   Judgment: limited   Gait/Station: normal gait/station   Motor Activity: no abnormal movements       Progress Toward Goals: Progressing    Recommended Treatment: Continue with pharmacotherapy, group therapy, milieu therapy and occupational therapy. 1.Increase remeron to 30 mg PO HS  Risks, benefits and possible side effects of Medications:   Risks, benefits, alternatives, and possible side effects of patient's psychiatric medications were discussed with patient.

## 2023-07-18 NOTE — PLAN OF CARE
Problem: Alteration in Thoughts and Perception  Goal: Agree to be compliant with medication regime, as prescribed and report medication side effects  Description: Interventions:  - Offer appropriate PRN medication and supervise ingestion; conduct AIMS, as needed   Outcome: Progressing     Problem: Alteration in Thoughts and Perception  Goal: Verbalize thoughts and feelings  Description: Interventions:  - Promote a nonjudgmental and trusting relationship with the patient through active listening and therapeutic communication  - Assess patient's level of functioning, behavior and potential for risk  - Engage patient in 1 on 1 interactions  - Encourage patient to express fears, feelings, frustrations, and discuss symptoms    - [place] patient to reality, help patient recognize reality-based thinking   - Administer medications as ordered and assess for potential side effects  - Provide the patient education related to the signs and symptoms of the illness and desired effects of prescribed medications  Outcome: Progressing

## 2023-07-18 NOTE — NURSING NOTE
Pt has been withdrawn to room, visible at times for meals, walking with peers briefly. Pt is scant in conversation. Continues to report AH, denies SI/HI at this time. Remained free of abnormal behaviors and overt delusions.

## 2023-07-18 NOTE — PROGRESS NOTES
Attended group alongside other people on the unit, and participated in discussion around recovery-centered topic.     07/18/23 1330 07/18/23 1700   Activity/Group Checklist   Group Life Skills  (1:30 PM Lifeskills Group) Other (Comment)  (5:00 PM Reflective Music Group)   Attendance Attended Attended   Attendance Duration (min) 46-60 31-45  (Arrived after this group started, stayed until end of this group.)   Interactions Did not interact  (Visibly reacted to discussion, did not interact with peers.) Interacted appropriately   Affect/Mood Appropriate Appropriate   Goals Achieved Other (Comment)  (Engaged with CPS and other peers on the unit by asking questions about online support groups and warmline, by identifying preferred groups, by describing natural support systems. Explored opportunities to pursue upcoming CPS training in nearby [facility].) Other (Comment)  (Engaged with CPS and other peers on the unit by examining the ways lyrics by [employer] address asking for help when wanting to die. Discovered advocacy of Jared Pederson in their music and lyrics.  Chose music that impacts personal recovery, and shared why.)

## 2023-07-18 NOTE — NURSING NOTE
Patient requested and received PRN Trazodone 50mg for insomnia at 2135. PRN effective, patient appeared to sleep well. Observed resting in bed with eyes closed, respirations regular, even and non-labored, throughout the night. No signs of distress, arouses easily. Continuous rounding maintained throughout shift for patient safety.

## 2023-07-19 PROCEDURE — 99232 SBSQ HOSP IP/OBS MODERATE 35: CPT | Performed by: STUDENT IN AN ORGANIZED HEALTH CARE EDUCATION/TRAINING PROGRAM

## 2023-07-19 RX ADMIN — ESCITALOPRAM OXALATE 10 MG: 10 TABLET ORAL at 08:55

## 2023-07-19 RX ADMIN — BENZTROPINE MESYLATE 0.5 MG: 0.5 TABLET ORAL at 08:54

## 2023-07-19 RX ADMIN — BENZTROPINE MESYLATE 0.5 MG: 0.5 TABLET ORAL at 17:04

## 2023-07-19 RX ADMIN — HALOPERIDOL 10 MG: 10 TABLET ORAL at 17:04

## 2023-07-19 RX ADMIN — HALOPERIDOL 10 MG: 10 TABLET ORAL at 08:54

## 2023-07-19 RX ADMIN — MIRTAZAPINE 30 MG: 15 TABLET, FILM COATED ORAL at 21:22

## 2023-07-19 RX ADMIN — OLANZAPINE 7.5 MG: 2.5 TABLET, FILM COATED ORAL at 21:22

## 2023-07-19 RX ADMIN — TRAZODONE HYDROCHLORIDE 50 MG: 50 TABLET ORAL at 21:22

## 2023-07-19 NOTE — PROGRESS NOTES
Progress Note - [facility] 33 y.o. male MRN: [id number]  Unit/Bed#: [facility] 216-01 Encounter: [id number]    Assessment/Plan   Principal Problem:    Schizophrenia [facility])  Active Problems:    Medical clearance for psychiatric admission    Tobacco abuse    T wave inversion in EKG      Subjective: Patient was seen, chart was reviewed, and case was discussed with the team. Patient appears withdrawn, malodorous, unkempt, guarded, paranoid and scant. He continues to endorse depressed mood and negative voices. Sleep and appetite are good. He is compliant with medications. Denies any side effects.     Behavior over the last 24 hours:  unchanged  Sleep: normal  Appetite: normal  Medication side effects: No    Medical ROS: Pertinent items are noted in HPI.all other systems are negative    Current Medications:  Current Facility-Administered Medications   Medication Dose Route Frequency   • acetaminophen (TYLENOL) tablet 650 mg  650 mg Oral Q6H PRN   • acetaminophen (TYLENOL) tablet 650 mg  650 mg Oral Q4H PRN   • acetaminophen (TYLENOL) tablet 975 mg  975 mg Oral Q6H PRN   • aluminum-magnesium hydroxide-simethicone (MAALOX) oral suspension 30 mL  30 mL Oral Q4H PRN   • haloperidol lactate (HALDOL) injection 2.5 mg  2.5 mg Intramuscular Q4H PRN Max 4/day    And   • LORazepam (ATIVAN) injection 1 mg  1 mg Intramuscular Q4H PRN Max 4/day    And   • benztropine (COGENTIN) injection 0.5 mg  0.5 mg Intramuscular Q4H PRN Max 4/day   • haloperidol lactate (HALDOL) injection 5 mg  5 mg Intramuscular Q4H PRN Max 4/day    And   • LORazepam (ATIVAN) injection 2 mg  2 mg Intramuscular Q4H PRN Max 4/day    And   • benztropine (COGENTIN) injection 1 mg  1 mg Intramuscular Q4H PRN Max 4/day   • benztropine (COGENTIN) tablet 0.5 mg  0.5 mg Oral BID   • benztropine (COGENTIN) tablet 1 mg  1 mg Oral Q4H PRN Max 6/day   • bisacodyl (DULCOLAX) rectal suppository 10 mg  10 mg Rectal Daily PRN   • hydrOXYzine HCL (ATARAX) tablet 50 mg  50 mg Oral Q6H PRN Max 4/day    Or   • diphenhydrAMINE (BENADRYL) injection 50 mg  50 mg Intramuscular Q6H PRN   • escitalopram (LEXAPRO) tablet 10 mg  10 mg Oral Daily   • haloperidol (HALDOL) tablet 1 mg  1 mg Oral Q6H PRN   • haloperidol (HALDOL) tablet 10 mg  10 mg Oral BID   • haloperidol (HALDOL) tablet 2.5 mg  2.5 mg Oral Q4H PRN Max 4/day   • haloperidol (HALDOL) tablet 5 mg  5 mg Oral Q4H PRN Max 4/day   • hydrOXYzine HCL (ATARAX) tablet 100 mg  100 mg Oral Q6H PRN Max 4/day    Or   • LORazepam (ATIVAN) injection 2 mg  2 mg Intramuscular Q6H PRN   • hydrOXYzine HCL (ATARAX) tablet 25 mg  25 mg Oral Q6H PRN Max 4/day   • mirtazapine (REMERON) tablet 30 mg  30 mg Oral HS   • nicotine polacrilex (NICORETTE) gum 4 mg  4 mg Oral Q2H PRN   • OLANZapine (ZyPREXA) tablet 7.5 mg  7.5 mg Oral HS   • polyethylene glycol (MIRALAX) packet 17 g  17 g Oral Daily PRN   • senna-docusate sodium (SENOKOT S) 8.6-50 mg per tablet 1 tablet  1 tablet Oral Daily PRN   • traZODone (DESYREL) tablet 50 mg  50 mg Oral HS PRN       Behavioral Health Medications:   all current active meds have been reviewed. Vitals:  Vitals:    07/19/23 0739   BP: 121/83   Pulse: 73   Resp: 16   Temp: (!) 97.1 °F (36.2 °C)   SpO2:        Laboratory results:    I have personally reviewed all pertinent laboratory/tests results. Mental Status Evaluation:    Appearance:  age appropriate   Behavior:  guarded   Speech:  soft   Mood:  depressed   Affect:  constricted and flat   Thought Process:  goal directed and logical   Thought Content:  normal   Perceptual Disturbances:  Auditory hallucinations without commands   Risk Potential: Suicidal Ideations none  Homicidal Ideations none  Potential for Aggression No   Sensorium:  person, place and time/date   Memory:  recent and remote memory grossly intact   Consciousness:  alert and awake    Attention: attention span appeared shorter than expected for age   Insight:  limited   Judgment: limited Gait/Station: normal gait/station   Motor Activity: no abnormal movements       Progress Toward Goals: Progressing    Recommended Treatment: Continue with pharmacotherapy, group therapy, milieu therapy and occupational therapy. Risks, benefits and possible side effects of Medications:   Risks, benefits, alternatives, and possible side effects of patient's psychiatric medications were discussed with patient.

## 2023-07-19 NOTE — PROGRESS NOTES
Pt withdrawn, scant in conversation, isolative. Reported hearing voices. Slept overnight. DC: TBD      07/19/23 0755   Team Meeting   Meeting Type Daily Rounds   Team Members Present   Team Members Present Physician;Nurse;; Other (Discipline and Name)   Physician Team Member Dr. Marcial Mead / Dr. Donato Baron / Nikolai Castorena / Daniel Swain Team Member [facility] [employer] [facility] Team Member Jessica Berry / Tiana Rey   Other (Discipline and Name) [facility]   Patient/Family Present   Patient Present No   Patient's Family Present No

## 2023-07-19 NOTE — PROGRESS NOTES
[facility] Group Note     07/19/23 5500   Activity/Group Checklist   Group Other (Comment)  (Open Discussion About Control, Perspectives, and Mindful Self-Awareness)   Attendance Attended   Attendance Duration (min) 46-60   Interactions Interacted appropriately  (but reserved and guarded at times)   Affect/Mood Calm  (Visibly sad at times)   Goals Achieved Identified feelings; Identified triggers; Able to listen to others; Able to engage in interactions; Able to self-disclose; Able to recieve feedback; Able to give feedback to another

## 2023-07-19 NOTE — NURSING NOTE
Pt denies SI/HI/VH currently reports AH which are manageable. Pt continues to be isolative to room and frequently naps throughout the day. Pt states he is not interested in attending groups. Pt is med compliant. Pt denies questions/concerns at this time.

## 2023-07-20 PROCEDURE — 99232 SBSQ HOSP IP/OBS MODERATE 35: CPT | Performed by: STUDENT IN AN ORGANIZED HEALTH CARE EDUCATION/TRAINING PROGRAM

## 2023-07-20 RX ORDER — OLANZAPINE 10 MG/1
10 TABLET ORAL
Status: DISCONTINUED | OUTPATIENT
Start: 2023-07-20 | End: 2023-07-25

## 2023-07-20 RX ADMIN — NICOTINE POLACRILEX 4 MG: 4 GUM, CHEWING BUCCAL at 13:37

## 2023-07-20 RX ADMIN — BENZTROPINE MESYLATE 0.5 MG: 0.5 TABLET ORAL at 08:48

## 2023-07-20 RX ADMIN — TRAZODONE HYDROCHLORIDE 50 MG: 50 TABLET ORAL at 21:26

## 2023-07-20 RX ADMIN — BENZTROPINE MESYLATE 0.5 MG: 0.5 TABLET ORAL at 16:46

## 2023-07-20 RX ADMIN — MIRTAZAPINE 30 MG: 15 TABLET, FILM COATED ORAL at 21:26

## 2023-07-20 RX ADMIN — OLANZAPINE 10 MG: 10 TABLET, FILM COATED ORAL at 21:26

## 2023-07-20 RX ADMIN — HALOPERIDOL 10 MG: 10 TABLET ORAL at 16:46

## 2023-07-20 RX ADMIN — HALOPERIDOL 10 MG: 10 TABLET ORAL at 08:49

## 2023-07-20 RX ADMIN — NICOTINE POLACRILEX 4 MG: 4 GUM, CHEWING BUCCAL at 17:45

## 2023-07-20 RX ADMIN — ESCITALOPRAM OXALATE 10 MG: 10 TABLET ORAL at 08:49

## 2023-07-20 NOTE — PLAN OF CARE
Problem: Alteration in Thoughts and Perception  Goal: Treatment Goal: Gain control of psychotic behaviors/thinking, reduce/eliminate presenting symptoms and demonstrate improved reality functioning upon discharge  Outcome: Progressing  Goal: Verbalize thoughts and feelings  Description: Interventions:  - Promote a nonjudgmental and trusting relationship with the patient through active listening and therapeutic communication  - Assess patient's level of functioning, behavior and potential for risk  - Engage patient in 1 on 1 interactions  - Encourage patient to express fears, feelings, frustrations, and discuss symptoms    - [place] patient to reality, help patient recognize reality-based thinking   - Administer medications as ordered and assess for potential side effects  - Provide the patient education related to the signs and symptoms of the illness and desired effects of prescribed medications  Outcome: Progressing  Goal: Refrain from acting on delusional thinking/internal stimuli  Description: Interventions:  - Monitor patient closely, per order   - Utilize least restrictive measures   - Set reasonable limits, give positive feedback for acceptable   - Administer medications as ordered and monitor of potential side effects  Outcome: Progressing  Goal: Agree to be compliant with medication regime, as prescribed and report medication side effects  Description: Interventions:  - Offer appropriate PRN medication and supervise ingestion; conduct AIMS, as needed   Outcome: Progressing  Goal: Attend and participate in unit activities, including therapeutic, recreational, and educational groups  Description: Interventions:  -Encourage Visitation and family involvement in care  Outcome: Progressing  Goal: Recognize dysfunctional thoughts, communicate reality-based thoughts at the time of discharge  Description: Interventions:  - Provide medication and psycho-education to assist patient in compliance and developing insight into his/her illness   Outcome: Progressing  Goal: Complete daily ADLs, including personal hygiene independently, as able  Description: Interventions:  - Observe, teach, and assist patient with ADLS  - Monitor and promote a balance of rest/activity, with adequate nutrition and elimination   Outcome: Progressing

## 2023-07-20 NOTE — PROGRESS NOTES
[facility] Group Note     07/20/23 1970   Activity/Group Checklist   Group Personal control  ([employer])   Attendance Attended   Attendance Duration (min) 46-60   Interactions Interacted appropriately   Affect/Mood Appropriate  (Brighter than previous encounters)   Goals Achieved Identified feelings; Identified triggers; Discussed coping strategies; Able to listen to others; Able to engage in interactions; Able to reflect/comment on own behavior;Able to self-disclose; Able to recieve feedback; Able to give feedback to another

## 2023-07-20 NOTE — NURSING NOTE
Pt napping throughout the day. Denies SI/HI. AH manangeable. Reports having a good day. After lunch, pt social with male peer.

## 2023-07-20 NOTE — PROGRESS NOTES
Progress Note - [facility] 33 y.o. male MRN: [id number]  Unit/Bed#: [facility] 216-01 Encounter: [id number]    Assessment/Plan   Principal Problem:    Schizophrenia [facility])  Active Problems:    Medical clearance for psychiatric admission    Tobacco abuse    T wave inversion in EKG      Subjective: Patient was seen, chart was reviewed, and case was discussed with the team. Patient appears withdrawn, guarded, soft and unkempt. Reports improvement in mood. He continues to endorse voices threatening to hurt him. Endorses voices for very long time and nothing helps. He is able to ignore but continues to bother him at times. He is compliant with medications. Denies any side effects.    Behavior over the last 24 hours:  unchanged  Sleep: normal  Appetite: normal  Medication side effects: No    Medical ROS: Pertinent items are noted in HPI.all other systems are negative    Current Medications:  Current Facility-Administered Medications   Medication Dose Route Frequency   • acetaminophen (TYLENOL) tablet 650 mg  650 mg Oral Q6H PRN   • acetaminophen (TYLENOL) tablet 650 mg  650 mg Oral Q4H PRN   • acetaminophen (TYLENOL) tablet 975 mg  975 mg Oral Q6H PRN   • aluminum-magnesium hydroxide-simethicone (MAALOX) oral suspension 30 mL  30 mL Oral Q4H PRN   • haloperidol lactate (HALDOL) injection 2.5 mg  2.5 mg Intramuscular Q4H PRN Max 4/day    And   • LORazepam (ATIVAN) injection 1 mg  1 mg Intramuscular Q4H PRN Max 4/day    And   • benztropine (COGENTIN) injection 0.5 mg  0.5 mg Intramuscular Q4H PRN Max 4/day   • haloperidol lactate (HALDOL) injection 5 mg  5 mg Intramuscular Q4H PRN Max 4/day    And   • LORazepam (ATIVAN) injection 2 mg  2 mg Intramuscular Q4H PRN Max 4/day    And   • benztropine (COGENTIN) injection 1 mg  1 mg Intramuscular Q4H PRN Max 4/day   • benztropine (COGENTIN) tablet 0.5 mg  0.5 mg Oral BID   • benztropine (COGENTIN) tablet 1 mg  1 mg Oral Q4H PRN Max 6/day   • bisacodyl (DULCOLAX) rectal suppository 10 mg  10 mg Rectal Daily PRN   • hydrOXYzine HCL (ATARAX) tablet 50 mg  50 mg Oral Q6H PRN Max 4/day    Or   • diphenhydrAMINE (BENADRYL) injection 50 mg  50 mg Intramuscular Q6H PRN   • escitalopram (LEXAPRO) tablet 10 mg  10 mg Oral Daily   • haloperidol (HALDOL) tablet 1 mg  1 mg Oral Q6H PRN   • haloperidol (HALDOL) tablet 10 mg  10 mg Oral BID   • haloperidol (HALDOL) tablet 2.5 mg  2.5 mg Oral Q4H PRN Max 4/day   • haloperidol (HALDOL) tablet 5 mg  5 mg Oral Q4H PRN Max 4/day   • hydrOXYzine HCL (ATARAX) tablet 100 mg  100 mg Oral Q6H PRN Max 4/day    Or   • LORazepam (ATIVAN) injection 2 mg  2 mg Intramuscular Q6H PRN   • hydrOXYzine HCL (ATARAX) tablet 25 mg  25 mg Oral Q6H PRN Max 4/day   • mirtazapine (REMERON) tablet 30 mg  30 mg Oral HS   • nicotine polacrilex (NICORETTE) gum 4 mg  4 mg Oral Q2H PRN   • OLANZapine (ZyPREXA) tablet 10 mg  10 mg Oral HS   • polyethylene glycol (MIRALAX) packet 17 g  17 g Oral Daily PRN   • senna-docusate sodium (SENOKOT S) 8.6-50 mg per tablet 1 tablet  1 tablet Oral Daily PRN   • traZODone (DESYREL) tablet 50 mg  50 mg Oral HS PRN       Behavioral Health Medications:   all current active meds have been reviewed. Vitals:  Vitals:    07/20/23 1513   BP: 138/85   Pulse: 97   Resp: 18   Temp: (!) 97.4 °F (36.3 °C)   SpO2: 98%       Laboratory results:    I have personally reviewed all pertinent laboratory/tests results. Mental Status Evaluation:    Appearance:  age appropriate   Behavior:  guarded   Speech:  soft   Mood:  anxious   Affect:  constricted and flat   Thought Process:  goal directed and logical   Thought Content:  normal   Perceptual Disturbances:  Auditory hallucinations without commands   Risk Potential: Suicidal Ideations none  Homicidal Ideations none  Potential for Aggression No   Sensorium:  person, place and time/date   Memory:  recent and remote memory grossly intact   Consciousness:  alert and awake    Attention: attention span appeared shorter than expected for age   Insight:  limited   Judgment: limited   Gait/Station: normal gait/station   Motor Activity: no abnormal movements       Progress Toward Goals: Progressing    Recommended Treatment: Continue with pharmacotherapy, group therapy, milieu therapy and occupational therapy. 1.Increase Zyprexa to 10 mg PO HS  Risks, benefits and possible side effects of Medications:   Risks, benefits, alternatives, and possible side effects of patient's psychiatric medications were discussed with patient.

## 2023-07-20 NOTE — NURSING NOTE
F/U to Trazodone administration as a sleep aid, pt appeared to be asleep in bed, no difficulty observed.

## 2023-07-20 NOTE — PLAN OF CARE
Problem: Alteration in Thoughts and Perception  Goal: Treatment Goal: Gain control of psychotic behaviors/thinking, reduce/eliminate presenting symptoms and demonstrate improved reality functioning upon discharge  Outcome: Progressing  Goal: Verbalize thoughts and feelings  Description: Interventions:  - Promote a nonjudgmental and trusting relationship with the patient through active listening and therapeutic communication  - Assess patient's level of functioning, behavior and potential for risk  - Engage patient in 1 on 1 interactions  - Encourage patient to express fears, feelings, frustrations, and discuss symptoms    - [place] patient to reality, help patient recognize reality-based thinking   - Administer medications as ordered and assess for potential side effects  - Provide the patient education related to the signs and symptoms of the illness and desired effects of prescribed medications  Outcome: Progressing  Goal: Refrain from acting on delusional thinking/internal stimuli  Description: Interventions:  - Monitor patient closely, per order   - Utilize least restrictive measures   - Set reasonable limits, give positive feedback for acceptable   - Administer medications as ordered and monitor of potential side effects  Outcome: Progressing  Goal: Agree to be compliant with medication regime, as prescribed and report medication side effects  Description: Interventions:  - Offer appropriate PRN medication and supervise ingestion; conduct AIMS, as needed   Outcome: Progressing  Goal: Attend and participate in unit activities, including therapeutic, recreational, and educational groups  Description: Interventions:  -Encourage Visitation and family involvement in care  Outcome: Not Progressing  Goal: Recognize dysfunctional thoughts, communicate reality-based thoughts at the time of discharge  Description: Interventions:  - Provide medication and psycho-education to assist patient in compliance and developing insight into his/her illness   Outcome: Progressing  Goal: Complete daily ADLs, including personal hygiene independently, as able  Description: Interventions:  - Observe, teach, and assist patient with ADLS  - Monitor and promote a balance of rest/activity, with adequate nutrition and elimination   Outcome: Progressing     Problem: Ineffective Coping  Goal: Identifies ineffective coping skills  Outcome: Progressing  Goal: Identifies healthy coping skills  Outcome: Progressing  Goal: Demonstrates healthy coping skills  Outcome: Progressing  Goal: Participates in unit activities  Description: Interventions:  - Provide therapeutic environment   - Provide required programming   - Redirect inappropriate behaviors   Outcome: Not Progressing  Goal: Patient/Family participate in treatment and DC plans  Description: Interventions:  - Provide therapeutic environment  Outcome: Progressing  Goal: Patient/Family verbalizes awareness of resources  Outcome: Progressing  Goal: Understands least restrictive measures  Description: Interventions:  - Utilize least restrictive behavior  Outcome: Progressing  Goal: Free from restraint events  Description: - Utilize least restrictive measures   - Provide behavioral interventions   - Redirect inappropriate behaviors   Outcome: Progressing     Problem: Alteration in Orientation  Goal: Treatment Goal: Demonstrate a reduction of confusion and improved orientation to person, place, time and/or situation upon discharge, according to optimum baseline/ability  Outcome: Progressing  Goal: Interact with staff daily  Description: Interventions:  - Assess and re-assess patient's level of orientation  - Engage patient in 1 on 1 interactions, daily, for a minimum of 15 minutes   - Establish rapport/trust with patient   Outcome: Progressing  Goal: Express concerns related to confused thinking related to:  Description: Interventions:  - Encourage patient to express feelings, fears, frustrations, hopes  - Assign consistent caregivers   - [place]/re-orient patient as needed  - Allow comfort items, as appropriate  - Provide visual cues, signs, etc.   Outcome: Progressing  Goal: Allow medical examinations, as recommended  Description: Interventions:  - Provide physical/neurological exams and/or referrals, per provider   Outcome: Progressing  Goal: Cooperate with recommended testing/procedures  Description: Interventions:  - Determine need for ancillary testing  - Observe for mental status changes  - Implement falls/precaution protocol   Outcome: Progressing  Goal: Attend and participate in unit activities, including therapeutic, recreational, and educational groups  Description: Interventions:  - Provide therapeutic and educational activities daily, encourage attendance and participation, and document same in the medical record   - Provide appropriate opportunities for reminiscence   - Provide a consistent daily routine   - Encourage family contact/visitation   Outcome: Not Progressing  Goal: Complete daily ADLs, including personal hygiene independently, as able  Description: Interventions:  - Observe, teach, and assist patient with ADLS  Outcome: Progressing     Problem: HEMATOLOGIC - ADULT  Goal: Maintains hematologic stability  Description: INTERVENTIONS  - Assess for signs and symptoms of bleeding or hemorrhage  - Monitor labs  - Administer supportive blood products/factors as ordered and appropriate  Outcome: Progressing

## 2023-07-21 PROCEDURE — 99232 SBSQ HOSP IP/OBS MODERATE 35: CPT | Performed by: STUDENT IN AN ORGANIZED HEALTH CARE EDUCATION/TRAINING PROGRAM

## 2023-07-21 RX ADMIN — MIRTAZAPINE 30 MG: 15 TABLET, FILM COATED ORAL at 21:44

## 2023-07-21 RX ADMIN — NICOTINE POLACRILEX 4 MG: 4 GUM, CHEWING BUCCAL at 17:55

## 2023-07-21 RX ADMIN — OLANZAPINE 10 MG: 10 TABLET, FILM COATED ORAL at 21:44

## 2023-07-21 RX ADMIN — ESCITALOPRAM OXALATE 10 MG: 10 TABLET ORAL at 08:51

## 2023-07-21 RX ADMIN — BENZTROPINE MESYLATE 0.5 MG: 0.5 TABLET ORAL at 17:27

## 2023-07-21 RX ADMIN — TRAZODONE HYDROCHLORIDE 50 MG: 50 TABLET ORAL at 21:43

## 2023-07-21 RX ADMIN — NICOTINE POLACRILEX 4 MG: 4 GUM, CHEWING BUCCAL at 19:57

## 2023-07-21 RX ADMIN — HALOPERIDOL 10 MG: 10 TABLET ORAL at 17:27

## 2023-07-21 RX ADMIN — HALOPERIDOL 10 MG: 10 TABLET ORAL at 08:51

## 2023-07-21 RX ADMIN — BENZTROPINE MESYLATE 0.5 MG: 0.5 TABLET ORAL at 08:51

## 2023-07-21 NOTE — PROGRESS NOTES
Progress Note - [facility] 33 y.o. male MRN: [id number]  Unit/Bed#: [facility] 216-01 Encounter: [id number]    Assessment/Plan   Principal Problem:    Schizophrenia [facility])  Active Problems:    Medical clearance for psychiatric admission    Tobacco abuse    T wave inversion in EKG      Subjective: Patient was seen, chart was reviewed, and case was discussed with the team. Patient appears withdrawn, isolative, soft and depressed. Reports improvement in mood. He continues to endorse voices threatening to hurt him. How ever he is able to ignore them. Sleep and appetite are good. He is compliant with medications. Denies any side effects.    Behavior over the last 24 hours:  unchanged  Sleep: normal  Appetite: normal  Medication side effects: No    Medical ROS: Pertinent items are noted in HPI.all other systems are negative    Current Medications:  Current Facility-Administered Medications   Medication Dose Route Frequency   • acetaminophen (TYLENOL) tablet 650 mg  650 mg Oral Q6H PRN   • acetaminophen (TYLENOL) tablet 650 mg  650 mg Oral Q4H PRN   • acetaminophen (TYLENOL) tablet 975 mg  975 mg Oral Q6H PRN   • aluminum-magnesium hydroxide-simethicone (MAALOX) oral suspension 30 mL  30 mL Oral Q4H PRN   • haloperidol lactate (HALDOL) injection 2.5 mg  2.5 mg Intramuscular Q4H PRN Max 4/day    And   • LORazepam (ATIVAN) injection 1 mg  1 mg Intramuscular Q4H PRN Max 4/day    And   • benztropine (COGENTIN) injection 0.5 mg  0.5 mg Intramuscular Q4H PRN Max 4/day   • haloperidol lactate (HALDOL) injection 5 mg  5 mg Intramuscular Q4H PRN Max 4/day    And   • LORazepam (ATIVAN) injection 2 mg  2 mg Intramuscular Q4H PRN Max 4/day    And   • benztropine (COGENTIN) injection 1 mg  1 mg Intramuscular Q4H PRN Max 4/day   • benztropine (COGENTIN) tablet 0.5 mg  0.5 mg Oral BID   • benztropine (COGENTIN) tablet 1 mg  1 mg Oral Q4H PRN Max 6/day   • bisacodyl (DULCOLAX) rectal suppository 10 mg  10 mg Rectal Daily PRN   • hydrOXYzine HCL (ATARAX) tablet 50 mg  50 mg Oral Q6H PRN Max 4/day    Or   • diphenhydrAMINE (BENADRYL) injection 50 mg  50 mg Intramuscular Q6H PRN   • escitalopram (LEXAPRO) tablet 10 mg  10 mg Oral Daily   • haloperidol (HALDOL) tablet 1 mg  1 mg Oral Q6H PRN   • haloperidol (HALDOL) tablet 10 mg  10 mg Oral BID   • haloperidol (HALDOL) tablet 2.5 mg  2.5 mg Oral Q4H PRN Max 4/day   • haloperidol (HALDOL) tablet 5 mg  5 mg Oral Q4H PRN Max 4/day   • hydrOXYzine HCL (ATARAX) tablet 100 mg  100 mg Oral Q6H PRN Max 4/day    Or   • LORazepam (ATIVAN) injection 2 mg  2 mg Intramuscular Q6H PRN   • hydrOXYzine HCL (ATARAX) tablet 25 mg  25 mg Oral Q6H PRN Max 4/day   • mirtazapine (REMERON) tablet 30 mg  30 mg Oral HS   • nicotine polacrilex (NICORETTE) gum 4 mg  4 mg Oral Q2H PRN   • OLANZapine (ZyPREXA) tablet 10 mg  10 mg Oral HS   • polyethylene glycol (MIRALAX) packet 17 g  17 g Oral Daily PRN   • senna-docusate sodium (SENOKOT S) 8.6-50 mg per tablet 1 tablet  1 tablet Oral Daily PRN   • traZODone (DESYREL) tablet 50 mg  50 mg Oral HS PRN       Behavioral Health Medications:   all current active meds have been reviewed. Vitals:  Vitals:    07/21/23 0825   BP: 103/65   Pulse: 74   Resp: 16   Temp: 98.2 °F (36.8 °C)   SpO2: 99%       Laboratory results:    I have personally reviewed all pertinent laboratory/tests results. Mental Status Evaluation:    Appearance:  age appropriate   Behavior:  guarded   Speech:  soft   Mood:  depressed   Affect:  constricted and mood-congruent   Thought Process:  goal directed and logical   Thought Content:  paranoia   Perceptual Disturbances:  Auditory hallucinations without commands   Risk Potential: Suicidal Ideations none  Homicidal Ideations none  Potential for Aggression No   Sensorium:  person, place and time/date   Memory:  recent and remote memory grossly intact   Consciousness:  alert and awake    Attention: attention span appeared shorter than expected for age   Insight:  limited   Judgment: limited   Gait/Station: normal gait/station   Motor Activity: no abnormal movements       Progress Toward Goals: Progressing    Recommended Treatment: Continue with pharmacotherapy, group therapy, milieu therapy and occupational therapy. Risks, benefits and possible side effects of Medications:   Risks, benefits, alternatives, and possible side effects of patient's psychiatric medications were discussed with patient.

## 2023-07-21 NOTE — NURSING NOTE
Patient appeared to have slept throughout the night without difficulty. No complaint voiced at this time.

## 2023-07-21 NOTE — NURSING NOTE
Pt more visible this evening, social with peers. Pleasant during interaction. +AH, manageable when distracted. Denies SI, HI, VH.

## 2023-07-21 NOTE — NURSING NOTE
Pt has been visible in milieu, social with select peers, attended groups and unit activities.  Pt is scant in conversation.  Continues to report AH, denies SI/HI at this time.  Remained free of abnormal behaviors and overt delusions.

## 2023-07-21 NOTE — PLAN OF CARE
Problem: Alteration in Thoughts and Perception  Goal: Treatment Goal: Gain control of psychotic behaviors/thinking, reduce/eliminate presenting symptoms and demonstrate improved reality functioning upon discharge  Outcome: Progressing  Goal: Verbalize thoughts and feelings  Description: Interventions:  - Promote a nonjudgmental and trusting relationship with the patient through active listening and therapeutic communication  - Assess patient's level of functioning, behavior and potential for risk  - Engage patient in 1 on 1 interactions  - Encourage patient to express fears, feelings, frustrations, and discuss symptoms    - [place] patient to reality, help patient recognize reality-based thinking   - Administer medications as ordered and assess for potential side effects  - Provide the patient education related to the signs and symptoms of the illness and desired effects of prescribed medications  Outcome: Progressing  Goal: Refrain from acting on delusional thinking/internal stimuli  Description: Interventions:  - Monitor patient closely, per order   - Utilize least restrictive measures   - Set reasonable limits, give positive feedback for acceptable   - Administer medications as ordered and monitor of potential side effects  Outcome: Progressing  Goal: Agree to be compliant with medication regime, as prescribed and report medication side effects  Description: Interventions:  - Offer appropriate PRN medication and supervise ingestion; conduct AIMS, as needed   Outcome: Progressing  Goal: Attend and participate in unit activities, including therapeutic, recreational, and educational groups  Description: Interventions:  -Encourage Visitation and family involvement in care  Outcome: Progressing  Goal: Recognize dysfunctional thoughts, communicate reality-based thoughts at the time of discharge  Description: Interventions:  - Provide medication and psycho-education to assist patient in compliance and developing insight into his/her illness   Outcome: Progressing  Goal: Complete daily ADLs, including personal hygiene independently, as able  Description: Interventions:  - Observe, teach, and assist patient with ADLS  - Monitor and promote a balance of rest/activity, with adequate nutrition and elimination   Outcome: Progressing     Problem: Ineffective Coping  Goal: Identifies ineffective coping skills  Outcome: Progressing  Goal: Identifies healthy coping skills  Outcome: Progressing  Goal: Demonstrates healthy coping skills  Outcome: Progressing  Goal: Participates in unit activities  Description: Interventions:  - Provide therapeutic environment   - Provide required programming   - Redirect inappropriate behaviors   Outcome: Progressing  Goal: Patient/Family participate in treatment and DC plans  Description: Interventions:  - Provide therapeutic environment  Outcome: Progressing  Goal: Patient/Family verbalizes awareness of resources  Outcome: Progressing  Goal: Understands least restrictive measures  Description: Interventions:  - Utilize least restrictive behavior  Outcome: Progressing  Goal: Free from restraint events  Description: - Utilize least restrictive measures   - Provide behavioral interventions   - Redirect inappropriate behaviors   Outcome: Progressing     Problem: Alteration in Orientation  Goal: Treatment Goal: Demonstrate a reduction of confusion and improved orientation to person, place, time and/or situation upon discharge, according to optimum baseline/ability  Outcome: Progressing  Goal: Interact with staff daily  Description: Interventions:  - Assess and re-assess patient's level of orientation  - Engage patient in 1 on 1 interactions, daily, for a minimum of 15 minutes   - Establish rapport/trust with patient   Outcome: Progressing  Goal: Express concerns related to confused thinking related to:  Description: Interventions:  - Encourage patient to express feelings, fears, frustrations, hopes  - Assign consistent caregivers   - [place]/re-orient patient as needed  - Allow comfort items, as appropriate  - Provide visual cues, signs, etc.   Outcome: Progressing  Goal: Allow medical examinations, as recommended  Description: Interventions:  - Provide physical/neurological exams and/or referrals, per provider   Outcome: Progressing  Goal: Cooperate with recommended testing/procedures  Description: Interventions:  - Determine need for ancillary testing  - Observe for mental status changes  - Implement falls/precaution protocol   Outcome: Progressing  Goal: Attend and participate in unit activities, including therapeutic, recreational, and educational groups  Description: Interventions:  - Provide therapeutic and educational activities daily, encourage attendance and participation, and document same in the medical record   - Provide appropriate opportunities for reminiscence   - Provide a consistent daily routine   - Encourage family contact/visitation   Outcome: Progressing  Goal: Complete daily ADLs, including personal hygiene independently, as able  Description: Interventions:  - Observe, teach, and assist patient with ADLS  Outcome: Progressing     Problem: HEMATOLOGIC - ADULT  Goal: Maintains hematologic stability  Description: INTERVENTIONS  - Assess for signs and symptoms of bleeding or hemorrhage  - Monitor labs  - Administer supportive blood products/factors as ordered and appropriate  Outcome: Progressing     Problem: DISCHARGE PLANNING - CARE MANAGEMENT  Goal: Discharge to post-acute care or home with appropriate resources  Description: INTERVENTIONS:  - Conduct assessment to determine patient/family and health care team treatment goals, and need for post-acute services based on payer coverage, community resources, and patient preferences, and barriers to discharge  - Address psychosocial, clinical, and financial barriers to discharge as identified in assessment in conjunction with the patient/family and health care team  - Arrange appropriate level of post-acute services according to patient’s   needs and preference and payer coverage in collaboration with the physician and health care team  - Communicate with and update the patient/family, physician, and health care team regarding progress on the discharge plan  - Arrange appropriate transportation to post-acute venues  Outcome: Progressing

## 2023-07-21 NOTE — PROGRESS NOTES
Pt out of bed in the evening playing cards. Denies SI. Reports AH during the day. DC: TBD      07/21/23 0756   Team Meeting   Meeting Type Daily Rounds   Team Members Present   Team Members Present Physician;Nurse;; Other (Discipline and Name)   Physician Team Member Dr. Autumn Peck / Dr. Cruz Host / Margarita Geiger / Lovely Cabello Team Member Ysabel Arce / [facility] Management Team Member Madhav Enrique / Kang Irizarry   Other (Discipline and Name) Willma Share - Art Therapy   Patient/Family Present   Patient Present No   Patient's Family Present No

## 2023-07-22 PROCEDURE — 99232 SBSQ HOSP IP/OBS MODERATE 35: CPT | Performed by: PSYCHIATRY & NEUROLOGY

## 2023-07-22 RX ADMIN — BENZTROPINE MESYLATE 0.5 MG: 0.5 TABLET ORAL at 17:00

## 2023-07-22 RX ADMIN — HALOPERIDOL 10 MG: 10 TABLET ORAL at 17:00

## 2023-07-22 RX ADMIN — ESCITALOPRAM OXALATE 10 MG: 10 TABLET ORAL at 08:47

## 2023-07-22 RX ADMIN — HALOPERIDOL 10 MG: 10 TABLET ORAL at 08:47

## 2023-07-22 RX ADMIN — BENZTROPINE MESYLATE 0.5 MG: 0.5 TABLET ORAL at 08:47

## 2023-07-22 RX ADMIN — OLANZAPINE 10 MG: 10 TABLET, FILM COATED ORAL at 21:51

## 2023-07-22 RX ADMIN — MIRTAZAPINE 30 MG: 15 TABLET, FILM COATED ORAL at 21:51

## 2023-07-22 RX ADMIN — TRAZODONE HYDROCHLORIDE 50 MG: 50 TABLET ORAL at 21:51

## 2023-07-22 RX ADMIN — NICOTINE POLACRILEX 4 MG: 4 GUM, CHEWING BUCCAL at 17:41

## 2023-07-22 NOTE — NURSING NOTE
Pt received Trazodone 50 mg for insomnia prn, per request, at 2143. At this time, 2241, pt is observed laying in bed with eyes closed, no signs or symptoms of restlessness or distress. Medication effective.

## 2023-07-22 NOTE — PROGRESS NOTES
Attended group alongside other people on the unit, participated in discussion around recovery-centered topic, and contributed their own lived experience toward connection between group members. 07/21/23 1530 07/21/23 1730   Activity/Group Checklist   Group Other (Comment)  (3:30 PM Goal Accomplishments) Other (Comment)  (, 5:30 PM Structured Journaling)   Attendance Attended Attended   Attendance Duration (min) 46-60 46-60   Interactions Interacted appropriately Interacted appropriately   Affect/Mood Appropriate Appropriate   Goals Achieved Other (Comment)  (Matched [place] to activism in [employer].  Examined advocacy vs. activism.) Other (Comment)  (Created postcards, extended book recommendations, contributed to unit journal.)

## 2023-07-22 NOTE — PLAN OF CARE
Problem: Alteration in Thoughts and Perception  Goal: Treatment Goal: Gain control of psychotic behaviors/thinking, reduce/eliminate presenting symptoms and demonstrate improved reality functioning upon discharge  Outcome: Progressing  Goal: Verbalize thoughts and feelings  Description: Interventions:  - Promote a nonjudgmental and trusting relationship with the patient through active listening and therapeutic communication  - Assess patient's level of functioning, behavior and potential for risk  - Engage patient in 1 on 1 interactions  - Encourage patient to express fears, feelings, frustrations, and discuss symptoms    - [place] patient to reality, help patient recognize reality-based thinking   - Administer medications as ordered and assess for potential side effects  - Provide the patient education related to the signs and symptoms of the illness and desired effects of prescribed medications  Outcome: Progressing  Goal: Refrain from acting on delusional thinking/internal stimuli  Description: Interventions:  - Monitor patient closely, per order   - Utilize least restrictive measures   - Set reasonable limits, give positive feedback for acceptable   - Administer medications as ordered and monitor of potential side effects  Outcome: Progressing  Goal: Agree to be compliant with medication regime, as prescribed and report medication side effects  Description: Interventions:  - Offer appropriate PRN medication and supervise ingestion; conduct AIMS, as needed   Outcome: Progressing  Goal: Attend and participate in unit activities, including therapeutic, recreational, and educational groups  Description: Interventions:  -Encourage Visitation and family involvement in care  Outcome: Progressing  Goal: Recognize dysfunctional thoughts, communicate reality-based thoughts at the time of discharge  Description: Interventions:  - Provide medication and psycho-education to assist patient in compliance and developing insight into his/her illness   Outcome: Progressing  Goal: Complete daily ADLs, including personal hygiene independently, as able  Description: Interventions:  - Observe, teach, and assist patient with ADLS  - Monitor and promote a balance of rest/activity, with adequate nutrition and elimination   Outcome: Progressing     Problem: Ineffective Coping  Goal: Identifies ineffective coping skills  Outcome: Progressing  Goal: Identifies healthy coping skills  Outcome: Progressing  Goal: Demonstrates healthy coping skills  Outcome: Progressing  Goal: Participates in unit activities  Description: Interventions:  - Provide therapeutic environment   - Provide required programming   - Redirect inappropriate behaviors   Outcome: Progressing  Goal: Patient/Family participate in treatment and DC plans  Description: Interventions:  - Provide therapeutic environment  Outcome: Progressing  Goal: Patient/Family verbalizes awareness of resources  Outcome: Progressing  Goal: Understands least restrictive measures  Description: Interventions:  - Utilize least restrictive behavior  Outcome: Progressing  Goal: Free from restraint events  Description: - Utilize least restrictive measures   - Provide behavioral interventions   - Redirect inappropriate behaviors   Outcome: Progressing     Problem: Alteration in Orientation  Goal: Treatment Goal: Demonstrate a reduction of confusion and improved orientation to person, place, time and/or situation upon discharge, according to optimum baseline/ability  Outcome: Progressing  Goal: Interact with staff daily  Description: Interventions:  - Assess and re-assess patient's level of orientation  - Engage patient in 1 on 1 interactions, daily, for a minimum of 15 minutes   - Establish rapport/trust with patient   Outcome: Progressing  Goal: Express concerns related to confused thinking related to:  Description: Interventions:  - Encourage patient to express feelings, fears, frustrations, hopes  - Assign consistent caregivers   - [place]/re-orient patient as needed  - Allow comfort items, as appropriate  - Provide visual cues, signs, etc.   Outcome: Progressing  Goal: Allow medical examinations, as recommended  Description: Interventions:  - Provide physical/neurological exams and/or referrals, per provider   Outcome: Progressing  Goal: Cooperate with recommended testing/procedures  Description: Interventions:  - Determine need for ancillary testing  - Observe for mental status changes  - Implement falls/precaution protocol   Outcome: Progressing  Goal: Attend and participate in unit activities, including therapeutic, recreational, and educational groups  Description: Interventions:  - Provide therapeutic and educational activities daily, encourage attendance and participation, and document same in the medical record   - Provide appropriate opportunities for reminiscence   - Provide a consistent daily routine   - Encourage family contact/visitation   Outcome: Progressing  Goal: Complete daily ADLs, including personal hygiene independently, as able  Description: Interventions:  - Observe, teach, and assist patient with ADLS  Outcome: Progressing     Problem: HEMATOLOGIC - ADULT  Goal: Maintains hematologic stability  Description: INTERVENTIONS  - Assess for signs and symptoms of bleeding or hemorrhage  - Monitor labs  - Administer supportive blood products/factors as ordered and appropriate  Outcome: Progressing

## 2023-07-22 NOTE — TREATMENT TEAM
07/22/23 1100   Team Meeting   Meeting Type Daily Rounds   Team Members Present   Team Members Present Physician;Nurse   Physician Team Member [facility]   Nursing Team Member [place]   Patient/Family Present   Patient Present No   Patient's Family Present No       AM Rounds:  naps during day, visible, social in evening. +AH, not bothersome, distraction, games/cards with peers, some groups. PRN Trazodone at night.

## 2023-07-22 NOTE — PROGRESS NOTES
Progress Note - [facility] 33 y.o. male MRN: [id number]  Unit/Bed#: U 216-01 Encounter: [id number]    Assessment/Plan   Principal Problem:    Schizophrenia ([address])  Active Problems:    Medical clearance for psychiatric admission    Tobacco abuse    T wave inversion in EKG      Subjective: Patient complained of tiredness- tolerable, unsure as to why. Reported diagnosis as schizophrenia. Later reported on hearing voices consistently , although not commanding. Onset was about 1 yr ago. Described as male and female voice, non commanding. Music and sleeping relieves symptoms. Patient is compliant to medications , stated intermittent response. More effective since hospitalization. Patient noticed less intense voices , rated level of severity from 10/10 to 5/10. Behavior over the last 24 hours:  unchanged  Sleep: normal  Appetite: normal  Medication side effects: defer to above.      Medical ROS: Pertinent items are noted in HPI.all other systems are negative    Current Medications:  Current Facility-Administered Medications   Medication Dose Route Frequency   • acetaminophen (TYLENOL) tablet 650 mg  650 mg Oral Q6H PRN   • acetaminophen (TYLENOL) tablet 650 mg  650 mg Oral Q4H PRN   • acetaminophen (TYLENOL) tablet 975 mg  975 mg Oral Q6H PRN   • aluminum-magnesium hydroxide-simethicone (MAALOX) oral suspension 30 mL  30 mL Oral Q4H PRN   • haloperidol lactate (HALDOL) injection 2.5 mg  2.5 mg Intramuscular Q4H PRN Max 4/day    And   • LORazepam (ATIVAN) injection 1 mg  1 mg Intramuscular Q4H PRN Max 4/day    And   • benztropine (COGENTIN) injection 0.5 mg  0.5 mg Intramuscular Q4H PRN Max 4/day   • haloperidol lactate (HALDOL) injection 5 mg  5 mg Intramuscular Q4H PRN Max 4/day    And   • LORazepam (ATIVAN) injection 2 mg  2 mg Intramuscular Q4H PRN Max 4/day    And   • benztropine (COGENTIN) injection 1 mg  1 mg Intramuscular Q4H PRN Max 4/day   • benztropine (COGENTIN) tablet 0.5 mg  0.5 mg Oral BID   • benztropine (COGENTIN) tablet 1 mg  1 mg Oral Q4H PRN Max 6/day   • bisacodyl (DULCOLAX) rectal suppository 10 mg  10 mg Rectal Daily PRN   • hydrOXYzine HCL (ATARAX) tablet 50 mg  50 mg Oral Q6H PRN Max 4/day    Or   • diphenhydrAMINE (BENADRYL) injection 50 mg  50 mg Intramuscular Q6H PRN   • escitalopram (LEXAPRO) tablet 10 mg  10 mg Oral Daily   • haloperidol (HALDOL) tablet 1 mg  1 mg Oral Q6H PRN   • haloperidol (HALDOL) tablet 10 mg  10 mg Oral BID   • haloperidol (HALDOL) tablet 2.5 mg  2.5 mg Oral Q4H PRN Max 4/day   • haloperidol (HALDOL) tablet 5 mg  5 mg Oral Q4H PRN Max 4/day   • hydrOXYzine HCL (ATARAX) tablet 100 mg  100 mg Oral Q6H PRN Max 4/day    Or   • LORazepam (ATIVAN) injection 2 mg  2 mg Intramuscular Q6H PRN   • hydrOXYzine HCL (ATARAX) tablet 25 mg  25 mg Oral Q6H PRN Max 4/day   • mirtazapine (REMERON) tablet 30 mg  30 mg Oral HS   • nicotine polacrilex (NICORETTE) gum 4 mg  4 mg Oral Q2H PRN   • OLANZapine (ZyPREXA) tablet 10 mg  10 mg Oral HS   • polyethylene glycol (MIRALAX) packet 17 g  17 g Oral Daily PRN   • senna-docusate sodium (SENOKOT S) 8.6-50 mg per tablet 1 tablet  1 tablet Oral Daily PRN   • traZODone (DESYREL) tablet 50 mg  50 mg Oral HS PRN       Behavioral Health Medications:   all current active meds have been reviewed. Vitals:    Vitals:    07/21/23 0825 07/21/23 1603 07/22/23 0745 07/22/23 0759   BP: 103/65 133/79 142/80    BP Location: Right arm Right arm Left arm    Pulse: 74 86 (!) 42 70   Resp: 16 16 16    Temp: 98.2 °F (36.8 °C) 98.9 °F (37.2 °C) (!) 97.4 °F (36.3 °C)    TempSrc: Tympanic Tympanic Tympanic    SpO2: 99% 96%     Weight:       Height:           Laboratory results:    I have personally reviewed all pertinent laboratory/tests results.     Mental Status Evaluation:    Appearance:  age appropriate , laying down    Behavior:  guarded   Speech:  soft   Mood:  normal   Affect:  constricted and mood-congruent   Thought Process:  goal directed and logical   Thought Content:  normal   Perceptual Disturbances: Auditory hallucinations without commands   Risk Potential: Suicidal Ideations none  Homicidal Ideations none  Potential for Aggression No   Sensorium:  person, place and time/date   Memory:  recent and remote memory grossly intact   Consciousness:  alert and awake    Attention: attention span appeared shorter than expected for age   Insight:  limited   Judgment: limited   Gait/Station: normal gait/station, based on prior MSE    Motor Activity: no abnormal movements     EKG 06/28/23 -  , VR 75    Progress Toward Goals: Progressing  Did not change patients medications     Recommended Treatment: Continue with pharmacotherapy, group therapy, milieu therapy and occupational therapy. Risks, benefits and possible side effects of Medications:   Risks, benefits, alternatives, and possible side effects of patient's psychiatric medications were discussed with patient.

## 2023-07-22 NOTE — NURSING NOTE
Pt observed laying in bed for nap after lunch. Reports no change in symptoms. Continuous non-bothersome but threatening AH. Scant in conversation. Reports planning to attend groups in the evening. Discussed healthy sleeping habits and encouraged pt to get OOB more frequently during daytime hours. Pt verbalized understanding but made no attempt at this time. Denies SI/HI/VH.

## 2023-07-22 NOTE — NURSING NOTE
Pt mostly withdrawn to room reading or napping. OOB meals and meds. Pleasant, but scant in conversation. Encouraged to walk a few laps around the unit, which pt complied with. Denies SI/HI. Still having AH, but not bothersome at this time.

## 2023-07-23 PROCEDURE — 99232 SBSQ HOSP IP/OBS MODERATE 35: CPT | Performed by: PSYCHIATRY & NEUROLOGY

## 2023-07-23 RX ADMIN — NICOTINE POLACRILEX 4 MG: 4 GUM, CHEWING BUCCAL at 17:57

## 2023-07-23 RX ADMIN — HALOPERIDOL 10 MG: 10 TABLET ORAL at 08:56

## 2023-07-23 RX ADMIN — MIRTAZAPINE 30 MG: 15 TABLET, FILM COATED ORAL at 21:20

## 2023-07-23 RX ADMIN — BENZTROPINE MESYLATE 0.5 MG: 0.5 TABLET ORAL at 16:50

## 2023-07-23 RX ADMIN — BENZTROPINE MESYLATE 0.5 MG: 0.5 TABLET ORAL at 08:56

## 2023-07-23 RX ADMIN — OLANZAPINE 10 MG: 10 TABLET, FILM COATED ORAL at 21:20

## 2023-07-23 RX ADMIN — HALOPERIDOL 10 MG: 10 TABLET ORAL at 16:49

## 2023-07-23 RX ADMIN — TRAZODONE HYDROCHLORIDE 50 MG: 50 TABLET ORAL at 21:20

## 2023-07-23 RX ADMIN — ESCITALOPRAM OXALATE 10 MG: 10 TABLET ORAL at 08:56

## 2023-07-23 NOTE — PROGRESS NOTES
Progress Note - [facility] 33 y.o. male MRN: [id number]   Unit/Bed#: [facility] 216-01 Encounter: [id number]    Documentation, nursing notes, medication reconciliation, labs, and vitals reviewed. Patient was seen for continuing care and reviewed with treatment team. No acute events over the past 24 hours. Per nursing report, patient isolative to room, frequently napping throughout day, reporting AH, no SI/HI. No scheduled medication changes over the past 24 hours. Continues to tolerate current medications with no adverse effects. On evaluation today, Gaylin  is found resting in bed. He remains scant and guarded but overall cooperative. He reports ongoing AH with mild improvement with current medications. He denies command hallucinations. AH are bothersome with negative content at times. He reports mild improvement in depressed mood. No SI/HI. No manic symptoms present on evaluation. Remains mostly isolative to room with limited participation in milieu.      Psychiatric ROS:  Behavior over the last 24 hours: unchanged  Sleep: normal  Appetite: normal  Medication side effects: No   ROS: all other systems are negative    Mental Status Evaluation:    Appearance:  casually dressed, adequate grooming   Behavior:  cooperative, calm, guarded, limited eye contact   Speech:  scant, soft   Mood:  depressed   Affect:  flat   Thought Process:  coherent, concrete, poverty of thought   Associations: concrete associations   Thought Content:  no overt delusions   Perceptual Disturbances: auditory hallucinations still present, but less frequent   Risk Potential: Suicidal ideation - None  Homicidal ideation - None  Potential for aggression - No   Sensorium:  oriented to person, place, time/date and situation   Memory:  recent and remote memory grossly intact   Consciousness:  alert and awake   Attention/Concentration: attention span and concentration appear shorter than expected for age   Insight:  limited Judgment: limited   Gait/Station: in bed   Motor Activity: no abnormal movements     Vital signs in last 24 hours:    Temp:  [97.6 °F (36.4 °C)] 97.6 °F (36.4 °C)  HR:  [76] 76  Resp:  [16] 16  BP: (130)/(75) 130/75    Laboratory results: I have personally reviewed all pertinent laboratory/tests results    Results from the past 24 hours: No results found for this or any previous visit (from the past 24 hour(s)).   Most Recent Labs:   Lab Results   Component Value Date    WBC 8.69 07/13/2023    RBC 5.05 07/13/2023    HGB 12.5 07/13/2023    HCT 40.9 07/13/2023     07/13/2023    RDW 13.2 07/13/2023    NEUTROABS 4.09 07/13/2023    SODIUM 139 07/13/2023    K 4.1 07/13/2023     07/13/2023    CO2 27 07/13/2023    BUN 11 07/13/2023    CREATININE 0.95 07/13/2023    GLUC 81 07/13/2023    CALCIUM 9.1 07/13/2023    AST 59 (H) 07/13/2023     (H) 07/13/2023    ALKPHOS 89 07/13/2023    TP 6.9 07/13/2023    ALB 3.9 07/13/2023    TBILI 0.44 07/13/2023    CHOLESTEROL 205 (H) 07/13/2023    HDL 48 07/13/2023    TRIG 86 07/13/2023    LDLCALC 140 (H) 07/13/2023    NONHDLC 157 07/13/2023    BNC1AEZHAUTZ 1.705 07/13/2023    HGBA1C 4.7 07/13/2023    EAG 88 07/13/2023       Suicide/Homicide Risk Assessment:    Risk of Harm to Self:   Nursing Suicide Risk Assessment Last 24 hours: C-SSRS Risk (Since Last Contact)  Calculated C-SSRS Risk Score (Since Last Contact): No Risk Indicated  Current Specific Risk Factors include: mental illness diagnosis, presence of hallucinations  Protective Factors: no current suicidal ideation, ability to communicate with staff on the unit, able to contract for safety on the unit, taking medications as ordered on the unit  Based on today's assessment, Alberto presents the following risk of harm to self: low    Risk of Harm to Others:  Nursing Homicide Risk Assessment: Violence Risk to Others: Denies within past 6 months  Current Specific Risk Factors include: current psychotic symptoms  Protective Factors: no current homicidal ideation, compliant with medications on the unit as ordered, compliant with unit milieu  Based on today's assessment, Alberto presents the following risk of harm to others: low    The following interventions are recommended: behavioral checks every 7 minutes, continued hospitalization on locked unit    Progress Toward Goals: progressing    Assessment/Plan   Principal Problem:    Schizophrenia ([address])  Active Problems:    Medical clearance for psychiatric admission    Tobacco abuse    T wave inversion in EKG      Recommended Treatment:     Planned medication and treatment changes:     All current active medications have been reviewed  Encourage group therapy, milieu therapy and occupational therapy  Behavioral Health checks every 7 minutes  Continue current medications:    Current Facility-Administered Medications   Medication Dose Route Frequency Provider Last Rate   • acetaminophen  650 mg Oral Q6H PRN Suhail Cuevas MD     • acetaminophen  650 mg Oral Q4H PRN Suhail Cuevas MD     • acetaminophen  975 mg Oral Q6H PRN Suhail Cuevas MD     • aluminum-magnesium hydroxide-simethicone  30 mL Oral Q4H PRN Suhail Cuevas MD     • haloperidol lactate  2.5 mg Intramuscular Q4H PRN Max 4/day Suhail Cuevas MD      And   • LORazepam  1 mg Intramuscular Q4H PRN Max 4/day Suhail Cuevas MD      And   • benztropine  0.5 mg Intramuscular Q4H PRN Max 4/day Suhail Cuevas MD     • haloperidol lactate  5 mg Intramuscular Q4H PRN Max 4/day Suhail Cuevas MD      And   • LORazepam  2 mg Intramuscular Q4H PRN Max 4/day Suhail Cuevas MD      And   • benztropine  1 mg Intramuscular Q4H PRN Max 4/day Suhail Cuevas MD     • benztropine  0.5 mg Oral BID Young Daniels DO     • benztropine  1 mg Oral Q4H PRN Max 6/day Suhail Cuevas MD     • bisacodyl  10 mg Rectal Daily PRN Suhail Cuevas MD     • hydrOXYzine HCL  50 mg Oral Q6H PRN Max 4/day Yasmin JACKMAN Elias Jason MD      Or   • diphenhydrAMINE  50 mg Intramuscular Q6H PRN Naya Cummins MD     • escitalopram  10 mg Oral Daily Naya Cummins MD     • haloperidol  1 mg Oral Q6H PRN Nayamarcelle Cummins MD     • haloperidol  10 mg Oral BID Nayamarcelle Cummins MD     • haloperidol  2.5 mg Oral Q4H PRN Max 4/day Nayamarcelle Cummins MD     • haloperidol  5 mg Oral Q4H PRN Max 4/day Naya Cummins MD     • hydrOXYzine HCL  100 mg Oral Q6H PRN Max 4/day Naya Cummins MD      Or   • LORazepam  2 mg Intramuscular Q6H PRN Naya Cummins MD     • hydrOXYzine HCL  25 mg Oral Q6H PRN Max 4/day Naya Cummins MD     • mirtazapine  30 mg Oral HS Andrez Robbins MD     • nicotine polacrilex  4 mg Oral Q2H PRN Naya Cummins MD     • OLANZapine  10 mg Oral HS Andrez Robbins MD     • polyethylene glycol  17 g Oral Daily PRN Naya Cummins MD     • senna-docusate sodium  1 tablet Oral Daily PRN Naya Cummins MD     • traZODone  50 mg Oral HS PRN Naya Cummins MD       Risks / Benefits of Treatment:    Risks, benefits, and possible side effects of medications explained to patient and patient verbalizes understanding and agreement for treatment. Counseling / Coordination of Care:    Patient's progress discussed with staff in treatment team meeting. Medications, treatment progress and treatment plan reviewed with patient.     Jae Valdes, [address] [place] [address] 07/23/23

## 2023-07-23 NOTE — NURSING NOTE
Pt received Trazodone 50 mg prn for insomnia at 2151. At this time, 2251, pt is laying in bed with eyes closed. No signs of distress. Medication is effective.

## 2023-07-23 NOTE — PLAN OF CARE
Problem: Alteration in Thoughts and Perception  Goal: Treatment Goal: Gain control of psychotic behaviors/thinking, reduce/eliminate presenting symptoms and demonstrate improved reality functioning upon discharge  7/23/2023 1805 by Bonny Julien RN  Outcome: Progressing  7/23/2023 1805 by Bonny Julien RN  Outcome: Progressing  7/23/2023 1804 by Bonny Julien RN  Outcome: Progressing  Goal: Verbalize thoughts and feelings  Description: Interventions:  - Promote a nonjudgmental and trusting relationship with the patient through active listening and therapeutic communication  - Assess patient's level of functioning, behavior and potential for risk  - Engage patient in 1 on 1 interactions  - Encourage patient to express fears, feelings, frustrations, and discuss symptoms    - [place] patient to reality, help patient recognize reality-based thinking   - Administer medications as ordered and assess for potential side effects  - Provide the patient education related to the signs and symptoms of the illness and desired effects of prescribed medications  7/23/2023 1805 by Bonny Juilen RN  Outcome: Progressing  7/23/2023 1805 by Bonny Julien RN  Outcome: Progressing  7/23/2023 1804 by Bonny Julien RN  Outcome: Progressing  Goal: Refrain from acting on delusional thinking/internal stimuli  Description: Interventions:  - Monitor patient closely, per order   - Utilize least restrictive measures   - Set reasonable limits, give positive feedback for acceptable   - Administer medications as ordered and monitor of potential side effects  7/23/2023 1805 by Bonny Julien RN  Outcome: Progressing  7/23/2023 1805 by Bonny Julien RN  Outcome: Progressing  7/23/2023 1804 by Bonny Julien RN  Outcome: Progressing  Goal: Agree to be compliant with medication regime, as prescribed and report medication side effects  Description: Interventions:  - Offer appropriate PRN medication and supervise ingestion; conduct AIMS, as needed   7/23/2023 1805 by Anthony Lowe RN  Outcome: Progressing  7/23/2023 1805 by Anthony Lowe RN  Outcome: Progressing  7/23/2023 1804 by Anthony Lowe RN  Outcome: Progressing  Goal: Attend and participate in unit activities, including therapeutic, recreational, and educational groups  Description: Interventions:  -Encourage Visitation and family involvement in care  7/23/2023 1805 by Anthony Lowe RN  Outcome: Progressing  7/23/2023 1805 by Anthony Lowe RN  Outcome: Progressing  7/23/2023 1804 by Anthony Lowe RN  Outcome: Progressing  Goal: Recognize dysfunctional thoughts, communicate reality-based thoughts at the time of discharge  Description: Interventions:  - Provide medication and psycho-education to assist patient in compliance and developing insight into his/her illness   7/23/2023 1805 by Anthony Lowe RN  Outcome: Progressing  7/23/2023 1805 by Anthony Lowe RN  Outcome: Progressing  7/23/2023 1804 by Anthony Lowe RN  Outcome: Progressing  Goal: Complete daily ADLs, including personal hygiene independently, as able  Description: Interventions:  - Observe, teach, and assist patient with ADLS  - Monitor and promote a balance of rest/activity, with adequate nutrition and elimination   7/23/2023 1805 by Anthony Lowe RN  Outcome: Progressing  7/23/2023 1805 by Anthony Lowe RN  Outcome: Progressing  7/23/2023 1804 by Anthony Lowe RN  Outcome: Progressing     Problem: Ineffective Coping  Goal: Identifies ineffective coping skills  7/23/2023 1805 by Anthony Lowe RN  Outcome: Progressing  7/23/2023 1805 by Anthony Lowe RN  Outcome: Progressing  7/23/2023 1804 by Anthony Lowe RN  Outcome: Progressing  Goal: Identifies healthy coping skills  7/23/2023 1805 by Anthony Lowe RN  Outcome: Progressing  7/23/2023 1805 by Anthony Lowe RN  Outcome: Progressing  7/23/2023 1804 by Anthony Lowe RN  Outcome: Progressing  Goal: Demonstrates healthy coping skills  7/23/2023 1805 by Jamie Grey RN  Outcome: Progressing  7/23/2023 1805 by Jamie Grey RN  Outcome: Progressing  7/23/2023 1804 by Jamie Grey RN  Outcome: Progressing  Goal: Participates in unit activities  Description: Interventions:  - Provide therapeutic environment   - Provide required programming   - Redirect inappropriate behaviors   7/23/2023 1805 by Jamie Grey RN  Outcome: Progressing  7/23/2023 1805 by Jamie Grey RN  Outcome: Progressing  7/23/2023 1804 by Jamie Grey RN  Outcome: Progressing  Goal: Patient/Family participate in treatment and DC plans  Description: Interventions:  - Provide therapeutic environment  7/23/2023 1805 by Jamie Grey RN  Outcome: Progressing  7/23/2023 1805 by Jamie Grey RN  Outcome: Progressing  7/23/2023 1804 by Jamie Grey RN  Outcome: Progressing  Goal: Patient/Family verbalizes awareness of resources  7/23/2023 1805 by Jamie Grey RN  Outcome: Progressing  7/23/2023 1805 by Jamie Grey RN  Outcome: Progressing  7/23/2023 1804 by Jamie Grey RN  Outcome: Progressing  Goal: Understands least restrictive measures  Description: Interventions:  - Utilize least restrictive behavior  7/23/2023 1805 by Jamie Grey RN  Outcome: Progressing  7/23/2023 1805 by Jamie Grey RN  Outcome: Progressing  7/23/2023 1804 by Jamie Grey RN  Outcome: Progressing  Goal: Free from restraint events  Description: - Utilize least restrictive measures   - Provide behavioral interventions   - Redirect inappropriate behaviors   7/23/2023 1805 by Jamie Grey RN  Outcome: Progressing  7/23/2023 1805 by Jamie Grey RN  Outcome: Progressing  7/23/2023 1804 by Jamie Grey RN  Outcome: Progressing     Problem: Alteration in Orientation  Goal: Treatment Goal: Demonstrate a reduction of confusion and improved orientation to person, place, time and/or situation upon discharge, according to optimum baseline/ability  7/23/2023 1805 by Catalina Vidal RN  Outcome: Progressing  7/23/2023 1805 by Catalina Vidal RN  Outcome: Progressing  7/23/2023 1804 by Catalina Vidal RN  Outcome: Progressing  Goal: Interact with staff daily  Description: Interventions:  - Assess and re-assess patient's level of orientation  - Engage patient in 1 on 1 interactions, daily, for a minimum of 15 minutes   - Establish rapport/trust with patient   7/23/2023 1805 by Catalina Vidal RN  Outcome: Progressing  7/23/2023 1805 by Catalina Vidal RN  Outcome: Progressing  7/23/2023 1804 by Catalina Vidal RN  Outcome: Progressing  Goal: Express concerns related to confused thinking related to:  Description: Interventions:  - Encourage patient to express feelings, fears, frustrations, hopes  - Assign consistent caregivers   - [place]/re-orient patient as needed  - Allow comfort items, as appropriate  - Provide visual cues, signs, etc.   7/23/2023 1805 by Catalina Vidal RN  Outcome: Progressing  7/23/2023 1805 by Catalina Vidal RN  Outcome: Progressing  7/23/2023 1804 by Catalina Vidal RN  Outcome: Progressing  Goal: Allow medical examinations, as recommended  Description: Interventions:  - Provide physical/neurological exams and/or referrals, per provider   7/23/2023 1805 by Catalina Vidal RN  Outcome: Progressing  7/23/2023 1805 by Catalina Vidal RN  Outcome: Progressing  7/23/2023 1804 by Catalina Vidal RN  Outcome: Progressing  Goal: Cooperate with recommended testing/procedures  Description: Interventions:  - Determine need for ancillary testing  - Observe for mental status changes  - Implement falls/precaution protocol   7/23/2023 1805 by Catalina Vidal RN  Outcome: Progressing  7/23/2023 1805 by Catalina Vidal RN  Outcome: Progressing  7/23/2023 1804 by Catalina Vidal RN  Outcome: Progressing  Goal: Attend and participate in unit activities, including therapeutic, recreational, and educational groups  Description: Interventions:  - Provide therapeutic and educational activities daily, encourage attendance and participation, and document same in the medical record   - Provide appropriate opportunities for reminiscence   - Provide a consistent daily routine   - Encourage family contact/visitation   7/23/2023 1805 by Namrata Olmedo RN  Outcome: Progressing  7/23/2023 1805 by Namrata Olmedo RN  Outcome: Progressing  7/23/2023 1804 by Namrata Olmedo RN  Outcome: Progressing  Goal: Complete daily ADLs, including personal hygiene independently, as able  Description: Interventions:  - Observe, teach, and assist patient with ADLS  6/01/3279 [id number] by Namrata Olmedo RN  Outcome: Progressing  7/23/2023 1805 by Namrata Olmedo RN  Outcome: Progressing  7/23/2023 1804 by Namrata Olmedo RN  Outcome: Progressing     Problem: DISCHARGE PLANNING - CARE MANAGEMENT  Goal: Discharge to post-acute care or home with appropriate resources  Description: INTERVENTIONS:  - Conduct assessment to determine patient/family and health care team treatment goals, and need for post-acute services based on payer coverage, community resources, and patient preferences, and barriers to discharge  - Address psychosocial, clinical, and financial barriers to discharge as identified in assessment in conjunction with the patient/family and health care team  - Arrange appropriate level of post-acute services according to patient’s   needs and preference and payer coverage in collaboration with the physician and health care team  - Communicate with and update the patient/family, physician, and health care team regarding progress on the discharge plan  - Arrange appropriate transportation to post-acute venues  Outcome: Progressing

## 2023-07-23 NOTE — NURSING NOTE
Pt reading for long durations during shift. 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 Pleasant in conversation, occasionally walks a lap around the unit. Denies SI/HI, AH present but not bothersome.

## 2023-07-23 NOTE — NURSING NOTE
Spoke to patient while in bed, patient appears anxious during conversation observed shaking both legs shaking both legs. Patient states he has a passive death wish because of his AH. He agrees to seek staff  If he feels like hurting himself. He naps through out the day Denies VH at present.  Patient attend meals

## 2023-07-23 NOTE — NURSING NOTE
Pt appearing to be sleeping with even respirations and easily aroused throughout night, as observed during continuous rounding.

## 2023-07-24 PROCEDURE — 99232 SBSQ HOSP IP/OBS MODERATE 35: CPT | Performed by: STUDENT IN AN ORGANIZED HEALTH CARE EDUCATION/TRAINING PROGRAM

## 2023-07-24 RX ADMIN — HALOPERIDOL 10 MG: 10 TABLET ORAL at 17:07

## 2023-07-24 RX ADMIN — NICOTINE POLACRILEX 4 MG: 4 GUM, CHEWING BUCCAL at 17:09

## 2023-07-24 RX ADMIN — MIRTAZAPINE 30 MG: 15 TABLET, FILM COATED ORAL at 21:18

## 2023-07-24 RX ADMIN — ESCITALOPRAM OXALATE 10 MG: 10 TABLET ORAL at 08:44

## 2023-07-24 RX ADMIN — BENZTROPINE MESYLATE 0.5 MG: 0.5 TABLET ORAL at 17:07

## 2023-07-24 RX ADMIN — HALOPERIDOL 10 MG: 10 TABLET ORAL at 08:44

## 2023-07-24 RX ADMIN — TRAZODONE HYDROCHLORIDE 50 MG: 50 TABLET ORAL at 21:17

## 2023-07-24 RX ADMIN — BENZTROPINE MESYLATE 0.5 MG: 0.5 TABLET ORAL at 08:45

## 2023-07-24 RX ADMIN — OLANZAPINE 10 MG: 10 TABLET, FILM COATED ORAL at 21:18

## 2023-07-24 NOTE — NURSING NOTE
Aung Barillas is withdrawn to his room and observed napping this afternoon. Patient does open his eyes and engaged briefly with this writer. Alberto denies current SI/HI/VH, endorses ongoing AH, which he reports are "normal" for him. Patient remains compliant with prescribed medications and is uninterested in attending scheduled groups on the unit, despite encouragement from staff. Aung Barillas is social for brief intervals with his room-mate. Patient out of bed for meals and medications only at this time, education provided r/t importance of physical activity and completion of ADLs. Aung Barillas was encouraged to seek staff with any questions and/or concerns, verbalized understanding.

## 2023-07-24 NOTE — PLAN OF CARE
Problem: Alteration in Thoughts and Perception  Goal: Verbalize thoughts and feelings  Description: Interventions:  - Promote a nonjudgmental and trusting relationship with the patient through active listening and therapeutic communication  - Assess patient's level of functioning, behavior and potential for risk  - Engage patient in 1 on 1 interactions  - Encourage patient to express fears, feelings, frustrations, and discuss symptoms    - [place] patient to reality, help patient recognize reality-based thinking   - Administer medications as ordered and assess for potential side effects  - Provide the patient education related to the signs and symptoms of the illness and desired effects of prescribed medications  Outcome: Progressing  Goal: Refrain from acting on delusional thinking/internal stimuli  Description: Interventions:  - Monitor patient closely, per order   - Utilize least restrictive measures   - Set reasonable limits, give positive feedback for acceptable   - Administer medications as ordered and monitor of potential side effects  Outcome: Progressing     Problem: Ineffective Coping  Goal: Free from restraint events  Description: - Utilize least restrictive measures   - Provide behavioral interventions   - Redirect inappropriate behaviors   Outcome: Progressing     Problem: Alteration in Orientation  Goal: Interact with staff daily  Description: Interventions:  - Assess and re-assess patient's level of orientation  - Engage patient in 1 on 1 interactions, daily, for a minimum of 15 minutes   - Establish rapport/trust with patient   Outcome: Progressing

## 2023-07-24 NOTE — NURSING NOTE
Pt withdrawn to room, appears to be napping. Pt is scant in conversation. Pt comes out for meals and medication. Denies SI/HI/VH but reports AH which are pt's baseline. Pt encouraged to use radio/sound machine if they are helpful. Pt shrugged and did not request either at this time.

## 2023-07-24 NOTE — NURSING NOTE
Pt received Trazodone 50 mg prn at 2120 for insomnia. At his time, 2220, pt is observed laying in bed with eyes closed. Medication effective.

## 2023-07-24 NOTE — CASE MANAGEMENT
CM spoke with Pt's sister Zohra Villafuerte @[phone]. Zohra Villafuerte stated that she was going to be contacting the [facility] assistance office to get an update on his application for medical assistance. CM asked that Zohra Villafuerte let them know if there is something that they need from us to help get him approved. Zohra Villafuerte called CM to inform them that the Pt just needed to prove his address in [place]. Zohra Villafuerte stated that she gave them a letter from a bill with his new address on it. Zohra Villafuerte stated that was all they were waiting for so he should be good soon. CM stated that they also told her that she should notify social security of his address change. CM stated that if she has issues when she calls, they can attempt to call with the Pt. Zohra Vlilafuerte stated that her Aunt Hanh asked to speak with the CM. Arianna Ozuna stated that she got a text about the Pt having some bills that are past due that he needs to get on his phone to pay. CM stated that they can arrange for him to get his phone and have them paid.

## 2023-07-24 NOTE — PROGRESS NOTES
Progress Note - [facility] 33 y.o. male MRN: [id number]  Unit/Bed#: [facility] 216-01 Encounter: [id number]    Assessment/Plan   Principal Problem:    Schizophrenia [facility])  Active Problems:    Medical clearance for psychiatric admission    Tobacco abuse    T wave inversion in EKG      Subjective: Patient was seen, chart was reviewed, and case was discussed with the team. Patient appears withdrawn, malodorous, unkempt, isolative and depressed. He continues to endorse to paranoia and threatening voices to hurt him. He doesn't feel safe outside the hospital. Mood is improving. Sleep and appetite are good. He is compliant with medications. Denies any side effects.    Behavior over the last 24 hours:  unchanged  Sleep: normal  Appetite: normal  Medication side effects: No    Medical ROS: Pertinent items are noted in HPI.all other systems are negative    Current Medications:  Current Facility-Administered Medications   Medication Dose Route Frequency   • acetaminophen (TYLENOL) tablet 650 mg  650 mg Oral Q6H PRN   • acetaminophen (TYLENOL) tablet 650 mg  650 mg Oral Q4H PRN   • acetaminophen (TYLENOL) tablet 975 mg  975 mg Oral Q6H PRN   • aluminum-magnesium hydroxide-simethicone (MAALOX) oral suspension 30 mL  30 mL Oral Q4H PRN   • haloperidol lactate (HALDOL) injection 2.5 mg  2.5 mg Intramuscular Q4H PRN Max 4/day    And   • LORazepam (ATIVAN) injection 1 mg  1 mg Intramuscular Q4H PRN Max 4/day    And   • benztropine (COGENTIN) injection 0.5 mg  0.5 mg Intramuscular Q4H PRN Max 4/day   • haloperidol lactate (HALDOL) injection 5 mg  5 mg Intramuscular Q4H PRN Max 4/day    And   • LORazepam (ATIVAN) injection 2 mg  2 mg Intramuscular Q4H PRN Max 4/day    And   • benztropine (COGENTIN) injection 1 mg  1 mg Intramuscular Q4H PRN Max 4/day   • benztropine (COGENTIN) tablet 0.5 mg  0.5 mg Oral BID   • benztropine (COGENTIN) tablet 1 mg  1 mg Oral Q4H PRN Max 6/day   • bisacodyl (DULCOLAX) rectal suppository 10 mg  10 mg Rectal Daily PRN   • hydrOXYzine HCL (ATARAX) tablet 50 mg  50 mg Oral Q6H PRN Max 4/day    Or   • diphenhydrAMINE (BENADRYL) injection 50 mg  50 mg Intramuscular Q6H PRN   • escitalopram (LEXAPRO) tablet 10 mg  10 mg Oral Daily   • haloperidol (HALDOL) tablet 1 mg  1 mg Oral Q6H PRN   • haloperidol (HALDOL) tablet 10 mg  10 mg Oral BID   • haloperidol (HALDOL) tablet 2.5 mg  2.5 mg Oral Q4H PRN Max 4/day   • haloperidol (HALDOL) tablet 5 mg  5 mg Oral Q4H PRN Max 4/day   • hydrOXYzine HCL (ATARAX) tablet 100 mg  100 mg Oral Q6H PRN Max 4/day    Or   • LORazepam (ATIVAN) injection 2 mg  2 mg Intramuscular Q6H PRN   • hydrOXYzine HCL (ATARAX) tablet 25 mg  25 mg Oral Q6H PRN Max 4/day   • mirtazapine (REMERON) tablet 30 mg  30 mg Oral HS   • nicotine polacrilex (NICORETTE) gum 4 mg  4 mg Oral Q2H PRN   • OLANZapine (ZyPREXA) tablet 10 mg  10 mg Oral HS   • polyethylene glycol (MIRALAX) packet 17 g  17 g Oral Daily PRN   • senna-docusate sodium (SENOKOT S) 8.6-50 mg per tablet 1 tablet  1 tablet Oral Daily PRN   • traZODone (DESYREL) tablet 50 mg  50 mg Oral HS PRN       Behavioral Health Medications:   all current active meds have been reviewed. Vitals:  Vitals:    07/24/23 0712   BP: 113/62   Pulse: 75   Resp: 18   Temp: 97.5 °F (36.4 °C)   SpO2:        Laboratory results:    I have personally reviewed all pertinent laboratory/tests results. Mental Status Evaluation:    Appearance:  age appropriate, disheveled and older than stated age   Behavior:  guarded   Speech:  soft   Mood:  "Ok"   Affect:  constricted and flat   Thought Process:  goal directed and logical   Thought Content:  delusions  persecutory   Perceptual Disturbances:  Auditory hallucinations without commands   Risk Potential: Suicidal Ideations none  Homicidal Ideations none  Potential for Aggression No   Sensorium:  person, place and time/date   Memory:  recent and remote memory grossly intact   Consciousness:  alert and awake Attention: attention span appeared shorter than expected for age   Insight:  limited   Judgment: limited   Gait/Station: normal gait/station   Motor Activity: no abnormal movements       Progress Toward Goals: Progressing    Recommended Treatment: Continue with pharmacotherapy, group therapy, milieu therapy and occupational therapy. Risks, benefits and possible side effects of Medications:   Risks, benefits, alternatives, and possible side effects of patient's psychiatric medications were discussed with patient.

## 2023-07-24 NOTE — NURSING NOTE
Pt in bed and appeared to be sleeping, with even respirations and easily aroused throughout night, as observed during continuous rounding.

## 2023-07-25 PROCEDURE — 99232 SBSQ HOSP IP/OBS MODERATE 35: CPT | Performed by: STUDENT IN AN ORGANIZED HEALTH CARE EDUCATION/TRAINING PROGRAM

## 2023-07-25 RX ADMIN — MIRTAZAPINE 30 MG: 15 TABLET, FILM COATED ORAL at 21:13

## 2023-07-25 RX ADMIN — BENZTROPINE MESYLATE 0.5 MG: 0.5 TABLET ORAL at 08:41

## 2023-07-25 RX ADMIN — NICOTINE POLACRILEX 4 MG: 4 GUM, CHEWING BUCCAL at 16:55

## 2023-07-25 RX ADMIN — BENZTROPINE MESYLATE 0.5 MG: 0.5 TABLET ORAL at 16:54

## 2023-07-25 RX ADMIN — HALOPERIDOL 10 MG: 10 TABLET ORAL at 16:54

## 2023-07-25 RX ADMIN — ESCITALOPRAM OXALATE 10 MG: 10 TABLET ORAL at 08:41

## 2023-07-25 RX ADMIN — OLANZAPINE 15 MG: 10 TABLET, FILM COATED ORAL at 21:13

## 2023-07-25 RX ADMIN — HALOPERIDOL 10 MG: 10 TABLET ORAL at 08:41

## 2023-07-25 RX ADMIN — TRAZODONE HYDROCHLORIDE 50 MG: 50 TABLET ORAL at 21:13

## 2023-07-25 NOTE — NURSING NOTE
Patient requested and received Trazodone 50 mg PO at 2117 for sleep. Medication appears to be effective and patient is resting quietly in his bed at 2217.

## 2023-07-25 NOTE — PROGRESS NOTES
Pt reported that his AH is at baseline. Has been out of bed more in the evening but last night was seclusive to room. Denies SI. Family is helping pt work on getting Medicaid in  [address] active for more local OP services to become available. DC: TBD        07/25/23 0753   Team Meeting   Meeting Type Daily Rounds   Team Members Present   Team Members Present Physician;Nurse;; Other (Discipline and Name)   Physician Team Member Dr. Beatriz Johnson / Dr. Da Booth / Kayleen Sin / Skip Ross Team Member Kishore Drake / Lyla Kan Management Team Member Chinedu Brewer / Elias Nix / Ru Ramos   Other (Discipline and Name) Rocio Lapping - Art Therapy   Patient/Family Present   Patient Present No   Patient's Family Present No

## 2023-07-25 NOTE — PROGRESS NOTES
Pt denies SI, visible, social, seen with peers in the evening. Reported AH is baseline. DC: This week (?)     07/25/23 8798   Team Meeting   Meeting Type Daily Rounds   Team Members Present   Team Members Present Physician;Nurse;; Other (Discipline and Name)   Physician Team Member Dr. Ginger Dunlap / Dr. Byron Damian / Idalia Colbert / Forrest Quintana Team Member Nolberto Cadet / Sheron Arreola Management Team Member Melissa Irvin / Idalia Lambert / Corrinne Sleeper   Other (Discipline and Name) Ronal Rodrigez - Art Therapy   Patient/Family Present   Patient Present No   Patient's Family Present No

## 2023-07-25 NOTE — PROGRESS NOTES
Progress Note - [facility] 33 y.o. male MRN: [id number]  Unit/Bed#: [facility] 216-01 Encounter: [id number]    Assessment/Plan   Principal Problem:    Schizophrenia [facility])  Active Problems:    Medical clearance for psychiatric admission    Tobacco abuse    T wave inversion in EKG      Subjective: Patient was seen, chart was reviewed, and case was discussed with the team.  Patient appears withdrawn, isolative, malodorous and depressed. He continue to endorse paranoia and threatening voices to hurt him. He doesn't feel safe outside the hospital. Sleep and appetite are improved. He is compliant with medications. Denies any side effects.    Behavior over the last 24 hours:  unchanged  Sleep: normal  Appetite: normal  Medication side effects: No    Medical ROS: Pertinent items are noted in HPI.all other systems are negative    Current Medications:  Current Facility-Administered Medications   Medication Dose Route Frequency   • acetaminophen (TYLENOL) tablet 650 mg  650 mg Oral Q6H PRN   • acetaminophen (TYLENOL) tablet 650 mg  650 mg Oral Q4H PRN   • acetaminophen (TYLENOL) tablet 975 mg  975 mg Oral Q6H PRN   • aluminum-magnesium hydroxide-simethicone (MAALOX) oral suspension 30 mL  30 mL Oral Q4H PRN   • haloperidol lactate (HALDOL) injection 2.5 mg  2.5 mg Intramuscular Q4H PRN Max 4/day    And   • LORazepam (ATIVAN) injection 1 mg  1 mg Intramuscular Q4H PRN Max 4/day    And   • benztropine (COGENTIN) injection 0.5 mg  0.5 mg Intramuscular Q4H PRN Max 4/day   • haloperidol lactate (HALDOL) injection 5 mg  5 mg Intramuscular Q4H PRN Max 4/day    And   • LORazepam (ATIVAN) injection 2 mg  2 mg Intramuscular Q4H PRN Max 4/day    And   • benztropine (COGENTIN) injection 1 mg  1 mg Intramuscular Q4H PRN Max 4/day   • benztropine (COGENTIN) tablet 0.5 mg  0.5 mg Oral BID   • benztropine (COGENTIN) tablet 1 mg  1 mg Oral Q4H PRN Max 6/day   • bisacodyl (DULCOLAX) rectal suppository 10 mg  10 mg Rectal Daily PRN   • hydrOXYzine HCL (ATARAX) tablet 50 mg  50 mg Oral Q6H PRN Max 4/day    Or   • diphenhydrAMINE (BENADRYL) injection 50 mg  50 mg Intramuscular Q6H PRN   • escitalopram (LEXAPRO) tablet 10 mg  10 mg Oral Daily   • haloperidol (HALDOL) tablet 1 mg  1 mg Oral Q6H PRN   • haloperidol (HALDOL) tablet 10 mg  10 mg Oral BID   • haloperidol (HALDOL) tablet 2.5 mg  2.5 mg Oral Q4H PRN Max 4/day   • haloperidol (HALDOL) tablet 5 mg  5 mg Oral Q4H PRN Max 4/day   • hydrOXYzine HCL (ATARAX) tablet 100 mg  100 mg Oral Q6H PRN Max 4/day    Or   • LORazepam (ATIVAN) injection 2 mg  2 mg Intramuscular Q6H PRN   • hydrOXYzine HCL (ATARAX) tablet 25 mg  25 mg Oral Q6H PRN Max 4/day   • mirtazapine (REMERON) tablet 30 mg  30 mg Oral HS   • nicotine polacrilex (NICORETTE) gum 4 mg  4 mg Oral Q2H PRN   • OLANZapine (ZyPREXA) tablet 10 mg  10 mg Oral HS   • polyethylene glycol (MIRALAX) packet 17 g  17 g Oral Daily PRN   • senna-docusate sodium (SENOKOT S) 8.6-50 mg per tablet 1 tablet  1 tablet Oral Daily PRN   • traZODone (DESYREL) tablet 50 mg  50 mg Oral HS PRN       Behavioral Health Medications:   all current active meds have been reviewed. Vitals:  Vitals:    07/25/23 0750   BP: 125/85   Pulse: 69   Resp: 18   Temp: 98.2 °F (36.8 °C)   SpO2:        Laboratory results:    I have personally reviewed all pertinent laboratory/tests results. Mental Status Evaluation:    Appearance:  age appropriate   Behavior:  guarded   Speech:  soft   Mood:  depressed   Affect:  constricted and mood-congruent   Thought Process:  goal directed and logical   Thought Content:  paranoia   Perceptual Disturbances:  Auditory hallucinations without commands   Risk Potential: Suicidal Ideations none  Homicidal Ideations none  Potential for Aggression No   Sensorium:  person, place and time/date   Memory:  recent and remote memory grossly intact   Consciousness:  alert and awake    Attention: attention span appeared shorter than expected for age Insight:  limited   Judgment: limited   Gait/Station: normal gait/station   Motor Activity: no abnormal movements       Progress Toward Goals: Progressing    Recommended Treatment: Continue with pharmacotherapy, group therapy, milieu therapy and occupational therapy. 1.Increase Zyprexa to 15 mg PO HS  Risks, benefits and possible side effects of Medications:   Risks, benefits, alternatives, and possible side effects of patient's psychiatric medications were discussed with patient.

## 2023-07-25 NOTE — CASE MANAGEMENT
CM met with Pt to assist them with paying a bill. Pt used their phone to pay his and his mom's car insurance. CM stated that his sister is supposed to be working on getting his address changed for his social security. CM stated that his sister provided proof of address for medical assistance through [place]. CM stated that they will call his sister Manjula Russell to give her an update. Pt verbalized understanding. CM called Manjula Russell and provided an update stating the Pt paid the bills he needed to. CM asked if she had heard anything for his medical assistance. Manjula Russell stated she emailed the proof of address and they responded back confirmed. Manjula Russell stated that she hasn't contacted social security but will call them since she is his rep payee. CM asked that she keep them updated when she calls.

## 2023-07-25 NOTE — PLAN OF CARE
Problem: Alteration in Thoughts and Perception  Goal: Treatment Goal: Gain control of psychotic behaviors/thinking, reduce/eliminate presenting symptoms and demonstrate improved reality functioning upon discharge  Outcome: Progressing  Goal: Verbalize thoughts and feelings  Description: Interventions:  - Promote a nonjudgmental and trusting relationship with the patient through active listening and therapeutic communication  - Assess patient's level of functioning, behavior and potential for risk  - Engage patient in 1 on 1 interactions  - Encourage patient to express fears, feelings, frustrations, and discuss symptoms    - [place] patient to reality, help patient recognize reality-based thinking   - Administer medications as ordered and assess for potential side effects  - Provide the patient education related to the signs and symptoms of the illness and desired effects of prescribed medications  Outcome: Progressing  Goal: Refrain from acting on delusional thinking/internal stimuli  Description: Interventions:  - Monitor patient closely, per order   - Utilize least restrictive measures   - Set reasonable limits, give positive feedback for acceptable   - Administer medications as ordered and monitor of potential side effects  Outcome: Progressing  Goal: Agree to be compliant with medication regime, as prescribed and report medication side effects  Description: Interventions:  - Offer appropriate PRN medication and supervise ingestion; conduct AIMS, as needed   Outcome: Progressing  Goal: Attend and participate in unit activities, including therapeutic, recreational, and educational groups  Description: Interventions:  -Encourage Visitation and family involvement in care  Outcome: Progressing  Goal: Recognize dysfunctional thoughts, communicate reality-based thoughts at the time of discharge  Description: Interventions:  - Provide medication and psycho-education to assist patient in compliance and developing insight into his/her illness   Outcome: Progressing  Goal: Complete daily ADLs, including personal hygiene independently, as able  Description: Interventions:  - Observe, teach, and assist patient with ADLS  - Monitor and promote a balance of rest/activity, with adequate nutrition and elimination   Outcome: Progressing

## 2023-07-25 NOTE — NURSING NOTE
Patient observed resting with eyes closed throughout the night. Q7 minute observational rounds maintained for promotion of patient safety.

## 2023-07-25 NOTE — NURSING NOTE
Pt continues to be withdrawn to room and in bed. Denies SI/HI Pt encouraged by multiple staff to attempt to participate in groups; pt declines. Reporting AH which have decreased to pt's baseline. Pt compliant with medication and attends meals.

## 2023-07-26 PROCEDURE — 99232 SBSQ HOSP IP/OBS MODERATE 35: CPT | Performed by: STUDENT IN AN ORGANIZED HEALTH CARE EDUCATION/TRAINING PROGRAM

## 2023-07-26 RX ADMIN — MIRTAZAPINE 30 MG: 15 TABLET, FILM COATED ORAL at 21:21

## 2023-07-26 RX ADMIN — HALOPERIDOL 10 MG: 10 TABLET ORAL at 17:31

## 2023-07-26 RX ADMIN — BENZTROPINE MESYLATE 0.5 MG: 0.5 TABLET ORAL at 09:26

## 2023-07-26 RX ADMIN — BENZTROPINE MESYLATE 0.5 MG: 0.5 TABLET ORAL at 17:31

## 2023-07-26 RX ADMIN — HALOPERIDOL 10 MG: 10 TABLET ORAL at 09:26

## 2023-07-26 RX ADMIN — ESCITALOPRAM OXALATE 10 MG: 10 TABLET ORAL at 09:26

## 2023-07-26 RX ADMIN — TRAZODONE HYDROCHLORIDE 50 MG: 50 TABLET ORAL at 21:22

## 2023-07-26 RX ADMIN — OLANZAPINE 15 MG: 10 TABLET, FILM COATED ORAL at 21:21

## 2023-07-26 RX ADMIN — NICOTINE POLACRILEX 4 MG: 4 GUM, CHEWING BUCCAL at 19:40

## 2023-07-26 NOTE — PROGRESS NOTES
Phoned [facility] to verify appt for Dx Mammo and US for 1/26.  [facility] will phone pt to verify.  Contact information provided.     Progress Note - [facility] 33 y.o. male MRN: [id number]  Unit/Bed#: [facility] 216-01 Encounter: [id number]    Assessment/Plan   Principal Problem:    Schizophrenia [facility])  Active Problems:    Medical clearance for psychiatric admission    Tobacco abuse    T wave inversion in EKG      Subjective: Patient was seen, chart was reviewed, and case was discussed with the team. Patient appears withdrawn, isolative, soft and guarded. He continues to endorse paranoia and threatening voices to hurt him. Mood is improving. Sleep and appetite are ok. He is compliant with medications. Denies any side effects.    Behavior over the last 24 hours:  unchanged  Sleep: normal  Appetite: normal  Medication side effects: No    Medical ROS: Pertinent items are noted in HPI.all other systems are negative    Current Medications:  Current Facility-Administered Medications   Medication Dose Route Frequency   • acetaminophen (TYLENOL) tablet 650 mg  650 mg Oral Q6H PRN   • acetaminophen (TYLENOL) tablet 650 mg  650 mg Oral Q4H PRN   • acetaminophen (TYLENOL) tablet 975 mg  975 mg Oral Q6H PRN   • aluminum-magnesium hydroxide-simethicone (MAALOX) oral suspension 30 mL  30 mL Oral Q4H PRN   • haloperidol lactate (HALDOL) injection 2.5 mg  2.5 mg Intramuscular Q4H PRN Max 4/day    And   • LORazepam (ATIVAN) injection 1 mg  1 mg Intramuscular Q4H PRN Max 4/day    And   • benztropine (COGENTIN) injection 0.5 mg  0.5 mg Intramuscular Q4H PRN Max 4/day   • haloperidol lactate (HALDOL) injection 5 mg  5 mg Intramuscular Q4H PRN Max 4/day    And   • LORazepam (ATIVAN) injection 2 mg  2 mg Intramuscular Q4H PRN Max 4/day    And   • benztropine (COGENTIN) injection 1 mg  1 mg Intramuscular Q4H PRN Max 4/day   • benztropine (COGENTIN) tablet 0.5 mg  0.5 mg Oral BID   • benztropine (COGENTIN) tablet 1 mg  1 mg Oral Q4H PRN Max 6/day   • bisacodyl (DULCOLAX) rectal suppository 10 mg  10 mg Rectal Daily PRN   • hydrOXYzine HCL (ATARAX) tablet 50 mg  50 mg Oral Q6H PRN Max 4/day    Or   • diphenhydrAMINE (BENADRYL) injection 50 mg  50 mg Intramuscular Q6H PRN   • escitalopram (LEXAPRO) tablet 10 mg  10 mg Oral Daily   • haloperidol (HALDOL) tablet 1 mg  1 mg Oral Q6H PRN   • haloperidol (HALDOL) tablet 10 mg  10 mg Oral BID   • haloperidol (HALDOL) tablet 2.5 mg  2.5 mg Oral Q4H PRN Max 4/day   • haloperidol (HALDOL) tablet 5 mg  5 mg Oral Q4H PRN Max 4/day   • hydrOXYzine HCL (ATARAX) tablet 100 mg  100 mg Oral Q6H PRN Max 4/day    Or   • LORazepam (ATIVAN) injection 2 mg  2 mg Intramuscular Q6H PRN   • hydrOXYzine HCL (ATARAX) tablet 25 mg  25 mg Oral Q6H PRN Max 4/day   • mirtazapine (REMERON) tablet 30 mg  30 mg Oral HS   • nicotine polacrilex (NICORETTE) gum 4 mg  4 mg Oral Q2H PRN   • OLANZapine (ZyPREXA) tablet 15 mg  15 mg Oral HS   • polyethylene glycol (MIRALAX) packet 17 g  17 g Oral Daily PRN   • senna-docusate sodium (SENOKOT S) 8.6-50 mg per tablet 1 tablet  1 tablet Oral Daily PRN   • traZODone (DESYREL) tablet 50 mg  50 mg Oral HS PRN       Behavioral Health Medications:   all current active meds have been reviewed. Vitals:  Vitals:    07/26/23 0754   BP: 122/79   Pulse:    Resp:    Temp: 98.2 °F (36.8 °C)   SpO2:        Laboratory results:    I have personally reviewed all pertinent laboratory/tests results. Mental Status Evaluation:    Appearance:  age appropriate   Behavior:  guarded   Speech:  soft   Mood:  depressed   Affect:  constricted and flat   Thought Process:  goal directed and logical   Thought Content:  delusions  persecutory   Perceptual Disturbances:  Auditory hallucinations without commands   Risk Potential: Suicidal Ideations none  Homicidal Ideations none  Potential for Aggression No   Sensorium:  person, place and time/date   Memory:  recent and remote memory grossly intact   Consciousness:  alert and awake    Attention: attention span appeared shorter than expected for age   Insight:  limited   Judgment: limited   Gait/Station: normal gait/station   Motor Activity: no abnormal movements       Progress Toward Goals: Progressing    Recommended Treatment: Continue with pharmacotherapy, group therapy, milieu therapy and occupational therapy. Risks, benefits and possible side effects of Medications:   Risks, benefits, alternatives, and possible side effects of patient's psychiatric medications were discussed with patient.

## 2023-07-26 NOTE — NURSING NOTE
Pt oob for meals seclusive to room for long periods of time, stated, he always hears voices will come to staff if  he feels unsafe

## 2023-07-26 NOTE — PROGRESS NOTES
[facility] Group Note     07/26/23 1530   Activity/Group Checklist   Group Other (Comment)  (Open discussion about the ripple effect our actions have had in specific areas of our life)   Attendance Attended   Attendance Duration (min) 16-30  (arrived late to group)   Interactions Interacted appropriately  (but reserved and verbally scant)   Affect/Mood Appropriate;Calm  (Attentive; Motivated)   Goals Achieved Identified feelings; Able to listen to others; Able to engage in interactions; Able to recieve feedback; Able to give feedback to another

## 2023-07-26 NOTE — PLAN OF CARE
Problem: Alteration in Thoughts and Perception  Goal: Attend and participate in unit activities, including therapeutic, recreational, and educational groups  Description: Interventions:  -Encourage Visitation and family involvement in care  Outcome: Progressing     Problem: Alteration in Thoughts and Perception  Goal: Recognize dysfunctional thoughts, communicate reality-based thoughts at the time of discharge  Description: Interventions:  - Provide medication and psycho-education to assist patient in compliance and developing insight into his/her illness   Outcome: Progressing

## 2023-07-26 NOTE — PROGRESS NOTES
Attended group alongside other people on the unit, participated in discussion around recovery-centered topic, and contributed their own lived experience toward connection between group members. 07/25/23 1730   Activity/Group Checklist   Group Other (Comment)  (5:30 PM Reflective Music Group)   Attendance Attended   Attendance Duration (min) Greater than 60   Interactions Interacted appropriately   Affect/Mood Appropriate   Goals Achieved Other (Comment)  (Engaged with CPS and extraordinarily full room of peers on the unit by choosing and sharing music that affects mood. Articulated ways songs act as a coping skills.  Read articles about Claude Carlos, and Angelito Duff describing their mental health.)

## 2023-07-26 NOTE — NURSING NOTE
Pt reports feeling well overnight. AH unchanged and not bothersome at this time. Compliant with medication. OOB for meals. Napping after breakfast. Not interested in groups at this time. Encouraged to get out of bed and be visible in the milieu. Pt shrugged shoulders. Denies SI/HI.

## 2023-07-27 PROCEDURE — 99232 SBSQ HOSP IP/OBS MODERATE 35: CPT | Performed by: STUDENT IN AN ORGANIZED HEALTH CARE EDUCATION/TRAINING PROGRAM

## 2023-07-27 RX ADMIN — HALOPERIDOL 10 MG: 10 TABLET ORAL at 18:30

## 2023-07-27 RX ADMIN — HALOPERIDOL 10 MG: 10 TABLET ORAL at 09:55

## 2023-07-27 RX ADMIN — TRAZODONE HYDROCHLORIDE 50 MG: 50 TABLET ORAL at 21:11

## 2023-07-27 RX ADMIN — BENZTROPINE MESYLATE 0.5 MG: 0.5 TABLET ORAL at 18:30

## 2023-07-27 RX ADMIN — OLANZAPINE 15 MG: 10 TABLET, FILM COATED ORAL at 21:11

## 2023-07-27 RX ADMIN — BENZTROPINE MESYLATE 0.5 MG: 0.5 TABLET ORAL at 09:55

## 2023-07-27 RX ADMIN — NICOTINE POLACRILEX 4 MG: 4 GUM, CHEWING BUCCAL at 19:19

## 2023-07-27 RX ADMIN — MIRTAZAPINE 30 MG: 15 TABLET, FILM COATED ORAL at 21:11

## 2023-07-27 RX ADMIN — ESCITALOPRAM OXALATE 10 MG: 10 TABLET ORAL at 09:55

## 2023-07-27 NOTE — NURSING NOTE
Patient appears to have slept throughout the night, visualized resting with eyes closed. Q7 minute observational rounds maintained throughout the night for promotion of patient safety.

## 2023-07-27 NOTE — PROGRESS NOTES
Pt reported he has AH but It has decreased and is at baseline. DC: TBD      07/26/23 0755   Team Meeting   Meeting Type Daily Rounds   Team Members Present   Team Members Present Physician;Nurse;; Other (Discipline and Name)   Physician Team Member Dr. Mali Byrd / Dr. Cathryne Bumpers / Josiah Nieves / Robbie Cavazos Team Member Mandeep Bardales / Duncan Mason Management Team Member Quoc Kraus / Feng Adler / Ann Yates   Other (Discipline and Name) Rivas Ly - Art Therapy   Patient/Family Present   Patient Present No   Patient's Family Present No

## 2023-07-27 NOTE — NURSING NOTE
Pt observed napping throughout the day. Not interested in talking. Reports no changes. Denies SI/HI. AH "the same". Pt encouraged to shower.

## 2023-07-27 NOTE — PLAN OF CARE
Problem: Alteration in Thoughts and Perception  Goal: Treatment Goal: Gain control of psychotic behaviors/thinking, reduce/eliminate presenting symptoms and demonstrate improved reality functioning upon discharge  Outcome: Progressing  Goal: Verbalize thoughts and feelings  Description: Interventions:  - Promote a nonjudgmental and trusting relationship with the patient through active listening and therapeutic communication  - Assess patient's level of functioning, behavior and potential for risk  - Engage patient in 1 on 1 interactions  - Encourage patient to express fears, feelings, frustrations, and discuss symptoms    - [place] patient to reality, help patient recognize reality-based thinking   - Administer medications as ordered and assess for potential side effects  - Provide the patient education related to the signs and symptoms of the illness and desired effects of prescribed medications  Outcome: Progressing  Goal: Refrain from acting on delusional thinking/internal stimuli  Description: Interventions:  - Monitor patient closely, per order   - Utilize least restrictive measures   - Set reasonable limits, give positive feedback for acceptable   - Administer medications as ordered and monitor of potential side effects  Outcome: Progressing  Goal: Agree to be compliant with medication regime, as prescribed and report medication side effects  Description: Interventions:  - Offer appropriate PRN medication and supervise ingestion; conduct AIMS, as needed   Outcome: Progressing  Goal: Attend and participate in unit activities, including therapeutic, recreational, and educational groups  Description: Interventions:  -Encourage Visitation and family involvement in care  Outcome: Progressing  Goal: Recognize dysfunctional thoughts, communicate reality-based thoughts at the time of discharge  Description: Interventions:  - Provide medication and psycho-education to assist patient in compliance and developing insight into his/her illness   Outcome: Progressing  Goal: Complete daily ADLs, including personal hygiene independently, as able  Description: Interventions:  - Observe, teach, and assist patient with ADLS  - Monitor and promote a balance of rest/activity, with adequate nutrition and elimination   Outcome: Progressing     Problem: Ineffective Coping  Goal: Identifies ineffective coping skills  Outcome: Progressing  Goal: Identifies healthy coping skills  Outcome: Progressing  Goal: Demonstrates healthy coping skills  Outcome: Progressing  Goal: Patient/Family participate in treatment and DC plans  Description: Interventions:  - Provide therapeutic environment  Outcome: Progressing  Goal: Patient/Family verbalizes awareness of resources  Outcome: Progressing  Goal: Understands least restrictive measures  Description: Interventions:  - Utilize least restrictive behavior  Outcome: Progressing  Goal: Free from restraint events  Description: - Utilize least restrictive measures   - Provide behavioral interventions   - Redirect inappropriate behaviors   Outcome: Progressing     Problem: Alteration in Orientation  Goal: Treatment Goal: Demonstrate a reduction of confusion and improved orientation to person, place, time and/or situation upon discharge, according to optimum baseline/ability  Outcome: Progressing  Goal: Interact with staff daily  Description: Interventions:  - Assess and re-assess patient's level of orientation  - Engage patient in 1 on 1 interactions, daily, for a minimum of 15 minutes   - Establish rapport/trust with patient   Outcome: Progressing  Goal: Express concerns related to confused thinking related to:  Description: Interventions:  - Encourage patient to express feelings, fears, frustrations, hopes  - Assign consistent caregivers   - [place]/re-orient patient as needed  - Allow comfort items, as appropriate  - Provide visual cues, signs, etc.   Outcome: Progressing  Goal: Allow medical examinations, as recommended  Description: Interventions:  - Provide physical/neurological exams and/or referrals, per provider   Outcome: Progressing  Goal: Cooperate with recommended testing/procedures  Description: Interventions:  - Determine need for ancillary testing  - Observe for mental status changes  - Implement falls/precaution protocol   Outcome: Progressing  Goal: Attend and participate in unit activities, including therapeutic, recreational, and educational groups  Description: Interventions:  - Provide therapeutic and educational activities daily, encourage attendance and participation, and document same in the medical record   - Provide appropriate opportunities for reminiscence   - Provide a consistent daily routine   - Encourage family contact/visitation   Outcome: Progressing  Goal: Complete daily ADLs, including personal hygiene independently, as able  Description: Interventions:  - Observe, teach, and assist patient with ADLS  Outcome: Progressing 139.9

## 2023-07-27 NOTE — PROGRESS NOTES
Pt continues to report AH but they're not bothersome. Slept overnight. Social at times. DC: TBD      07/27/23 0757   Team Meeting   Meeting Type Daily Rounds   Team Members Present   Team Members Present Physician;Nurse;; Other (Discipline and Name)   Physician Team Member Dr. Sophia Carver / Dr. Johanna Mckinley / Haile Swain / Andrew Andino Team Member Jas Gardiner / Facundo Salgadoget Management Team Member Sophronia Dance / Lemon Runner / Sarah Galindo   Other (Discipline and Name) [employer] - Art [facility]   Patient/Family Present   Patient Present No   Patient's Family Present No

## 2023-07-27 NOTE — PROGRESS NOTES
Progress Note - [facility] [email or web address]] 33 y.o. male MRN: [id number]  Unit/Bed#: [facility] 216-01 Encounter: [id number]    Assessment/Plan   Principal Problem:    Schizophrenia [facility])  Active Problems:    Medical clearance for psychiatric admission    Tobacco abuse    T wave inversion in EKG      Subjective: Patient was seen, chart was reviewed, and case was discussed with the team. Patient appears disheveled, unkempt, withdrawn, scant, isolative, paranoid and anxious. He endorses depressed mood, hopelessness, negativity, paranoia and threatening voices. Sleep and appetite are good. He is compliant with medications. Denies any side effects.    Behavior over the last 24 hours:  unchanged  Sleep: normal  Appetite: normal  Medication side effects: No    Medical ROS: Pertinent items are noted in HPI.all other systems are negative    Current Medications:  Current Facility-Administered Medications   Medication Dose Route Frequency   • acetaminophen (TYLENOL) tablet 650 mg  650 mg Oral Q6H PRN   • acetaminophen (TYLENOL) tablet 650 mg  650 mg Oral Q4H PRN   • acetaminophen (TYLENOL) tablet 975 mg  975 mg Oral Q6H PRN   • aluminum-magnesium hydroxide-simethicone (MAALOX) oral suspension 30 mL  30 mL Oral Q4H PRN   • haloperidol lactate (HALDOL) injection 2.5 mg  2.5 mg Intramuscular Q4H PRN Max 4/day    And   • LORazepam (ATIVAN) injection 1 mg  1 mg Intramuscular Q4H PRN Max 4/day    And   • benztropine (COGENTIN) injection 0.5 mg  0.5 mg Intramuscular Q4H PRN Max 4/day   • haloperidol lactate (HALDOL) injection 5 mg  5 mg Intramuscular Q4H PRN Max 4/day    And   • LORazepam (ATIVAN) injection 2 mg  2 mg Intramuscular Q4H PRN Max 4/day    And   • benztropine (COGENTIN) injection 1 mg  1 mg Intramuscular Q4H PRN Max 4/day   • benztropine (COGENTIN) tablet 0.5 mg  0.5 mg Oral BID   • benztropine (COGENTIN) tablet 1 mg  1 mg Oral Q4H PRN Max 6/day   • bisacodyl (DULCOLAX) rectal suppository 10 mg  10 mg Rectal Daily PRN • hydrOXYzine HCL (ATARAX) tablet 50 mg  50 mg Oral Q6H PRN Max 4/day    Or   • diphenhydrAMINE (BENADRYL) injection 50 mg  50 mg Intramuscular Q6H PRN   • escitalopram (LEXAPRO) tablet 10 mg  10 mg Oral Daily   • haloperidol (HALDOL) tablet 1 mg  1 mg Oral Q6H PRN   • haloperidol (HALDOL) tablet 10 mg  10 mg Oral BID   • haloperidol (HALDOL) tablet 2.5 mg  2.5 mg Oral Q4H PRN Max 4/day   • haloperidol (HALDOL) tablet 5 mg  5 mg Oral Q4H PRN Max 4/day   • hydrOXYzine HCL (ATARAX) tablet 100 mg  100 mg Oral Q6H PRN Max 4/day    Or   • LORazepam (ATIVAN) injection 2 mg  2 mg Intramuscular Q6H PRN   • hydrOXYzine HCL (ATARAX) tablet 25 mg  25 mg Oral Q6H PRN Max 4/day   • mirtazapine (REMERON) tablet 30 mg  30 mg Oral HS   • nicotine polacrilex (NICORETTE) gum 4 mg  4 mg Oral Q2H PRN   • OLANZapine (ZyPREXA) tablet 15 mg  15 mg Oral HS   • polyethylene glycol (MIRALAX) packet 17 g  17 g Oral Daily PRN   • senna-docusate sodium (SENOKOT S) 8.6-50 mg per tablet 1 tablet  1 tablet Oral Daily PRN   • traZODone (DESYREL) tablet 50 mg  50 mg Oral HS PRN       Behavioral Health Medications:   all current active meds have been reviewed. Vitals:  Vitals:    07/27/23 0744   BP: 122/74   Pulse: 75   Resp: 16   Temp: 98.2 °F (36.8 °C)   SpO2: 97%       Laboratory results:    I have personally reviewed all pertinent laboratory/tests results. Mental Status Evaluation:    Appearance:  disheveled and overweight   Behavior:  guarded   Speech:  soft   Mood:  anxious and depressed   Affect:  constricted and flat   Thought Process:  goal directed   Thought Content:  paranoia   Perceptual Disturbances:  Auditory hallucinations without commands   Risk Potential: Suicidal Ideations none  Homicidal Ideations none  Potential for Aggression No   Sensorium:  person, place and time/date   Memory:  recent and remote memory grossly intact   Consciousness:  alert and awake    Attention: attention span appeared shorter than expected for age Insight:  limited   Judgment: limited   Gait/Station: normal gait/station   Motor Activity: no abnormal movements       Progress Toward Goals: Progressing    Recommended Treatment: Continue with pharmacotherapy, group therapy, milieu therapy and occupational therapy. Risks, benefits and possible side effects of Medications:   Risks, benefits, alternatives, and possible side effects of patient's psychiatric medications were discussed with patient.

## 2023-07-27 NOTE — NURSING NOTE
Pt withdrawn to room. Declined breakfast. Reports "I'm fine". AH unchanged. Encouraged pt to get up for morning medication. Denies SI/HI.

## 2023-07-27 NOTE — NURSING NOTE
Patient was given PRN Trazodone 50 mg for sleep. Upon follow-up for reassessment, patient appears to be resting with eyes closed.

## 2023-07-28 PROCEDURE — 99232 SBSQ HOSP IP/OBS MODERATE 35: CPT | Performed by: STUDENT IN AN ORGANIZED HEALTH CARE EDUCATION/TRAINING PROGRAM

## 2023-07-28 RX ADMIN — HALOPERIDOL 10 MG: 10 TABLET ORAL at 10:00

## 2023-07-28 RX ADMIN — BENZTROPINE MESYLATE 0.5 MG: 0.5 TABLET ORAL at 17:46

## 2023-07-28 RX ADMIN — HALOPERIDOL 10 MG: 10 TABLET ORAL at 17:46

## 2023-07-28 RX ADMIN — OLANZAPINE 15 MG: 10 TABLET, FILM COATED ORAL at 21:09

## 2023-07-28 RX ADMIN — NICOTINE POLACRILEX 4 MG: 4 GUM, CHEWING BUCCAL at 18:00

## 2023-07-28 RX ADMIN — BENZTROPINE MESYLATE 0.5 MG: 0.5 TABLET ORAL at 10:00

## 2023-07-28 RX ADMIN — MIRTAZAPINE 30 MG: 15 TABLET, FILM COATED ORAL at 21:08

## 2023-07-28 RX ADMIN — ESCITALOPRAM OXALATE 10 MG: 10 TABLET ORAL at 10:00

## 2023-07-28 RX ADMIN — TRAZODONE HYDROCHLORIDE 50 MG: 50 TABLET ORAL at 21:10

## 2023-07-28 NOTE — PLAN OF CARE
Problem: Alteration in Thoughts and Perception  Goal: Treatment Goal: Gain control of psychotic behaviors/thinking, reduce/eliminate presenting symptoms and demonstrate improved reality functioning upon discharge  Outcome: Progressing  Goal: Verbalize thoughts and feelings  Description: Interventions:  - Promote a nonjudgmental and trusting relationship with the patient through active listening and therapeutic communication  - Assess patient's level of functioning, behavior and potential for risk  - Engage patient in 1 on 1 interactions  - Encourage patient to express fears, feelings, frustrations, and discuss symptoms    - [place] patient to reality, help patient recognize reality-based thinking   - Administer medications as ordered and assess for potential side effects  - Provide the patient education related to the signs and symptoms of the illness and desired effects of prescribed medications  Outcome: Progressing  Goal: Refrain from acting on delusional thinking/internal stimuli  Description: Interventions:  - Monitor patient closely, per order   - Utilize least restrictive measures   - Set reasonable limits, give positive feedback for acceptable   - Administer medications as ordered and monitor of potential side effects  Outcome: Progressing  Goal: Agree to be compliant with medication regime, as prescribed and report medication side effects  Description: Interventions:  - Offer appropriate PRN medication and supervise ingestion; conduct AIMS, as needed   Outcome: Progressing  Goal: Attend and participate in unit activities, including therapeutic, recreational, and educational groups  Description: Interventions:  -Encourage Visitation and family involvement in care  Outcome: Progressing  Goal: Recognize dysfunctional thoughts, communicate reality-based thoughts at the time of discharge  Description: Interventions:  - Provide medication and psycho-education to assist patient in compliance and developing insight into his/her illness   Outcome: Progressing  Goal: Complete daily ADLs, including personal hygiene independently, as able  Description: Interventions:  - Observe, teach, and assist patient with ADLS  - Monitor and promote a balance of rest/activity, with adequate nutrition and elimination   Outcome: Progressing     Problem: Ineffective Coping  Goal: Identifies ineffective coping skills  Outcome: Progressing  Goal: Identifies healthy coping skills  Outcome: Progressing  Goal: Demonstrates healthy coping skills  Outcome: Progressing  Goal: Participates in unit activities  Description: Interventions:  - Provide therapeutic environment   - Provide required programming   - Redirect inappropriate behaviors   Outcome: Progressing  Goal: Patient/Family participate in treatment and DC plans  Description: Interventions:  - Provide therapeutic environment  Outcome: Progressing  Goal: Patient/Family verbalizes awareness of resources  Outcome: Progressing  Goal: Understands least restrictive measures  Description: Interventions:  - Utilize least restrictive behavior  Outcome: Progressing  Goal: Free from restraint events  Description: - Utilize least restrictive measures   - Provide behavioral interventions   - Redirect inappropriate behaviors   Outcome: Progressing     Problem: Alteration in Orientation  Goal: Treatment Goal: Demonstrate a reduction of confusion and improved orientation to person, place, time and/or situation upon discharge, according to optimum baseline/ability  Outcome: Progressing  Goal: Interact with staff daily  Description: Interventions:  - Assess and re-assess patient's level of orientation  - Engage patient in 1 on 1 interactions, daily, for a minimum of 15 minutes   - Establish rapport/trust with patient   Outcome: Progressing  Goal: Express concerns related to confused thinking related to:  Description: Interventions:  - Encourage patient to express feelings, fears, frustrations, hopes  - Assign consistent caregivers   - [place]/re-orient patient as needed  - Allow comfort items, as appropriate  - Provide visual cues, signs, etc.   Outcome: Progressing  Goal: Allow medical examinations, as recommended  Description: Interventions:  - Provide physical/neurological exams and/or referrals, per provider   Outcome: Progressing  Goal: Cooperate with recommended testing/procedures  Description: Interventions:  - Determine need for ancillary testing  - Observe for mental status changes  - Implement falls/precaution protocol   Outcome: Progressing  Goal: Attend and participate in unit activities, including therapeutic, recreational, and educational groups  Description: Interventions:  - Provide therapeutic and educational activities daily, encourage attendance and participation, and document same in the medical record   - Provide appropriate opportunities for reminiscence   - Provide a consistent daily routine   - Encourage family contact/visitation   Outcome: Progressing  Goal: Complete daily ADLs, including personal hygiene independently, as able  Description: Interventions:  - Observe, teach, and assist patient with ADLS  Outcome: Progressing

## 2023-07-28 NOTE — PROGRESS NOTES
No change. Continues to report AH but at baseline. Has only been coming out of room for meals and medications. Slept. DC: TBD        07/28/23 0754   Team Meeting   Meeting Type Daily Rounds   Team Members Present   Team Members Present Physician;Nurse;; Other (Discipline and Name)   Physician Team Member Dr. Marcial Mead / Dr. Taryn Mckeon / Joan Ren[place] Member Mo Gil / James Hatch Management Team Member Jessica Berry / Tiana Rey / Bertrand León   Other (Discipline and Name) [employer] - Art Therapy   Patient/Family Present   Patient Present No   Patient's Family Present No

## 2023-07-28 NOTE — NURSING NOTE
Pt remains withdrawn to room on day shift, often observed napping in bed. Pt denies SI/HI continues to report AH at baseline which are manageable. Pt is aware he may request PRN medication and talk to staff if [facility] become overwhelming for him. Pt comes out of room for medications and meals.

## 2023-07-28 NOTE — PROGRESS NOTES
Progress Note - [facility] 33 y.o. male MRN: [id number]  Unit/Bed#: U 216-01 Encounter: [id number]    John Whitt was seen for follow-up, chart reviewed and discussed with nursing staff and treatment team.  Nursing staff notes he has been compliant with medications. No aggressive or agitated behavior. Tends to be isolative to self and to room and not attending groups. Patient was seen in his room lying in bed. He is calm and cooperative on approach. Reports that he is feeling "a little better". States that he continues with residual auditory hallucinations but they are decreased. States that they say "go to hell ". No visual hallucinations noted. Denies suicidal or homicidal ideation. Denies any adverse effects with his medications. Physically, reports no pain or discomfort.       Behavior over the last 24 hours:  unchanged  Sleep: normal  Appetite: normal  Medication side effects: No  ROS: no complaints  /64 (BP Location: Right arm)   Pulse 72   Temp 97.9 °F (36.6 °C) (Tympanic)   Resp 16   Ht 5' 7" (1.702 m)   Wt 103 kg (226 lb 12.8 oz)   SpO2 100%   BMI 35.52 kg/m²     Medications:   Current Facility-Administered Medications   Medication Dose Route Frequency   • acetaminophen (TYLENOL) tablet 650 mg  650 mg Oral Q6H PRN   • acetaminophen (TYLENOL) tablet 650 mg  650 mg Oral Q4H PRN   • acetaminophen (TYLENOL) tablet 975 mg  975 mg Oral Q6H PRN   • aluminum-magnesium hydroxide-simethicone (MAALOX) oral suspension 30 mL  30 mL Oral Q4H PRN   • haloperidol lactate (HALDOL) injection 2.5 mg  2.5 mg Intramuscular Q4H PRN Max 4/day    And   • LORazepam (ATIVAN) injection 1 mg  1 mg Intramuscular Q4H PRN Max 4/day    And   • benztropine (COGENTIN) injection 0.5 mg  0.5 mg Intramuscular Q4H PRN Max 4/day   • haloperidol lactate (HALDOL) injection 5 mg  5 mg Intramuscular Q4H PRN Max 4/day    And   • LORazepam (ATIVAN) injection 2 mg  2 mg Intramuscular Q4H PRN Max 4/day And   • benztropine (COGENTIN) injection 1 mg  1 mg Intramuscular Q4H PRN Max 4/day   • benztropine (COGENTIN) tablet 0.5 mg  0.5 mg Oral BID   • benztropine (COGENTIN) tablet 1 mg  1 mg Oral Q4H PRN Max 6/day   • bisacodyl (DULCOLAX) rectal suppository 10 mg  10 mg Rectal Daily PRN   • hydrOXYzine HCL (ATARAX) tablet 50 mg  50 mg Oral Q6H PRN Max 4/day    Or   • diphenhydrAMINE (BENADRYL) injection 50 mg  50 mg Intramuscular Q6H PRN   • escitalopram (LEXAPRO) tablet 10 mg  10 mg Oral Daily   • haloperidol (HALDOL) tablet 1 mg  1 mg Oral Q6H PRN   • haloperidol (HALDOL) tablet 10 mg  10 mg Oral BID   • haloperidol (HALDOL) tablet 2.5 mg  2.5 mg Oral Q4H PRN Max 4/day   • haloperidol (HALDOL) tablet 5 mg  5 mg Oral Q4H PRN Max 4/day   • hydrOXYzine HCL (ATARAX) tablet 100 mg  100 mg Oral Q6H PRN Max 4/day    Or   • LORazepam (ATIVAN) injection 2 mg  2 mg Intramuscular Q6H PRN   • hydrOXYzine HCL (ATARAX) tablet 25 mg  25 mg Oral Q6H PRN Max 4/day   • mirtazapine (REMERON) tablet 30 mg  30 mg Oral HS   • nicotine polacrilex (NICORETTE) gum 4 mg  4 mg Oral Q2H PRN   • OLANZapine (ZyPREXA) tablet 15 mg  15 mg Oral HS   • polyethylene glycol (MIRALAX) packet 17 g  17 g Oral Daily PRN   • senna-docusate sodium (SENOKOT S) 8.6-50 mg per tablet 1 tablet  1 tablet Oral Daily PRN   • traZODone (DESYREL) tablet 50 mg  50 mg Oral HS PRN       Labs:   Admission on 07/11/2023   Component Date Value Ref Range Status   • Color,  07/12/2023 Yellow   Final   • Clarity,  07/12/2023 Clear   Final   • Specific Gravity,  07/12/2023 1.025  1.005 - 1.030 Final   • pH,  07/12/2023 6.0  4.5, 5.0, 5.5, 6.0, 6.5, 7.0, 7.5, 8.0 Final   • Leukocytes, UA 07/12/2023 Negative  Negative Final   • Nitrite, UA 07/12/2023 Negative  Negative Final   • Protein, UA 07/12/2023 Negative  Negative mg/dl Final   • Glucose, UA 07/12/2023 Negative  Negative mg/dl Final   • Ketones, UA 07/12/2023 Negative  Negative mg/dl Final   • Urobilinogen, UA 07/12/2023 <2.0  <2.0 mg/dl mg/dl Final   • Bilirubin, UA 07/12/2023 Negative  Negative Final   • Occult Blood, UA 07/12/2023 Negative  Negative Final   • RBC, UA 07/12/2023 None Seen  None Seen, 2-4 /hpf Final   • WBC, UA 07/12/2023 None Seen  None Seen, 2-4, 5-60 /hpf Final   • Epithelial Cells 07/12/2023 Occasional  None Seen, Occasional /hpf Final   • Bacteria, UA 07/12/2023 None Seen  None Seen, Occasional /hpf Final   • WBC 07/13/2023 8.69  4.31 - 10.16 Thousand/uL Final   • RBC 07/13/2023 5.05  3.88 - 5.62 Million/uL Final   • Hemoglobin 07/13/2023 12.5  12.0 - 17.0 g/dL Final   • Hematocrit 07/13/2023 40.9  36.5 - 49.3 % Final   • MCV 07/13/2023 81 (L)  82 - 98 fL Final   • MCH 07/13/2023 24.8 (L)  26.8 - 34.3 pg Final   • MCHC 07/13/2023 30.6 (L)  31.4 - 37.4 g/dL Final   • RDW 07/13/2023 13.2  11.6 - 15.1 % Final   • MPV 07/13/2023 10.4  8.9 - 12.7 fL Final   • Platelets 55/53/3328 208  149 - 390 Thousands/uL Final   • nRBC 07/13/2023 0  /100 WBCs Final   • Neutrophils Relative 07/13/2023 46  43 - 75 % Final   • Immat GRANS % 07/13/2023 1  0 - 2 % Final   • Lymphocytes Relative 07/13/2023 32  14 - 44 % Final   • Monocytes Relative 07/13/2023 11  4 - 12 % Final   • Eosinophils Relative 07/13/2023 9 (H)  0 - 6 % Final   • Basophils Relative 07/13/2023 1  0 - 1 % Final   • Neutrophils Absolute 07/13/2023 4.09  1.85 - 7.62 Thousands/µL Final   • Immature Grans Absolute 07/13/2023 0.05  0.00 - 0.20 Thousand/uL Final   • Lymphocytes Absolute 07/13/2023 2.76  0.60 - 4.47 Thousands/µL Final   • Monocytes Absolute 07/13/2023 0.95  0.17 - 1.22 Thousand/µL Final   • Eosinophils Absolute 07/13/2023 0.78 (H)  0.00 - 0.61 Thousand/µL Final   • Basophils Absolute 07/13/2023 0.06  0.00 - 0.10 Thousands/µL Final   • Sodium 07/13/2023 139  135 - 147 mmol/L Final   • Potassium 07/13/2023 4.1  3.5 - 5.3 mmol/L Final   • Chloride 07/13/2023 105  96 - 108 mmol/L Final   • CO2 07/13/2023 27  21 - 32 mmol/L Final   • ANION GAP 07/13/2023 7  mmol/L Final   • BUN 07/13/2023 11  5 - 25 mg/dL Final   • Creatinine 07/13/2023 0.95  0.60 - 1.30 mg/dL Final   • Glucose 07/13/2023 81  65 - 140 mg/dL Final   • Glucose, Fasting 07/13/2023 81  65 - 99 mg/dL Final   • Calcium 07/13/2023 9.1  8.4 - 10.2 mg/dL Final   • AST 07/13/2023 59 (H)  13 - 39 U/L Final   • ALT 07/13/2023 163 (H)  7 - 52 U/L Final   • Alkaline Phosphatase 07/13/2023 89  34 - 104 U/L Final   • Total Protein 07/13/2023 6.9  6.4 - 8.4 g/dL Final   • Albumin 07/13/2023 3.9  3.5 - 5.0 g/dL Final   • Total Bilirubin 07/13/2023 0.44  0.20 - 1.00 mg/dL Final   • eGFR 07/13/2023 110  ml/min/1.73sq m Final   • Folate 07/13/2023 6.4  >5.9 ng/mL Final   • Cholesterol 07/13/2023 205 (H)  See Comment mg/dL Final   • Triglycerides 07/13/2023 86  See Comment mg/dL Final   • HDL, Direct 07/13/2023 48  >=40 mg/dL Final   • LDL Calculated 07/13/2023 140 (H)  0 - 100 mg/dL Final   • Non-HDL-Chol (CHOL-HDL) 07/13/2023 157  mg/dl Final   • TSH 3RD GENERATON 07/13/2023 1.705  0.450 - 4.500 uIU/mL Final   • Vitamin B-12 07/13/2023 250  180 - 914 pg/mL Final   • Vit D, 25-Hydroxy 07/13/2023 8.8 (L)  30.0 - 100.0 ng/mL Final   • Hemoglobin A1C 07/13/2023 4.7  Normal 3.8-5.6%; PreDiabetic 5.7-6.4%; Diabetic >=6.5%; Glycemic control for adults with diabetes <7.0% % Final   • EAG 07/13/2023 88  mg/dl Final   • Syphilis Total Antibody 07/13/2023 Non-reactive  Non-Reactive Final       Mental Status Evaluation:  Appearance:  age appropriate and disheveled   Behavior:  guarded   Speech:  soft and Scant   Mood:  anxious and dysthymic   Affect:  constricted   Thought Process:  concrete   Thought Content:     Associations: delusions  persecutory    [place]   Perceptual Disturbances:  Auditory hallucinations, no command hallucinations, no visual hallucinations   Risk Potential: Suicidal Ideations none, Homicidal Ideations none and Potential for Aggression No   Sensorium:  person, place and time/date   Cognition: recent and remote memory grossly intact   Consciousness:  alert and awake    Attention: attention span appeared shorter than expected for age   Insight:  limited   Judgment: limited   Gait/Station: normal gait/station   Motor Activity: no abnormal movements     Progress Toward Goals: Progressing    Assessment/Plan   Principal Problem:    Schizophrenia ([address])  Active Problems:    Medical clearance for psychiatric admission    Tobacco abuse    T wave inversion in EKG      Recommended Treatment:  Cogentin 0.5 mg twice daily  Lexapro 10 mg daily and Haldol 10 mg twice daily  Mirtazapine 30 mg at bedtime  Olanzapine 15 mg at bedtime    Continue with group therapy, milieu therapy and occupational therapy. Risks, benefits and possible side effects of Medications:   Risks, benefits, and possible side effects of medications explained to patient and patient verbalizes understanding. Counseling / Coordination of Care  Total floor / unit time spent today 25 minutes. Greater than 50% of total time was spent with the patient and / or family counseling and / or coordination of care.  A description of the counseling / coordination of care: Medication management, chart review, patient interview

## 2023-07-28 NOTE — NURSING NOTE
Patient visible on unit at times. Denies SI / HI / VH. Stated his AH are unchanged. Encouraged to attend group.

## 2023-07-29 PROCEDURE — 99232 SBSQ HOSP IP/OBS MODERATE 35: CPT | Performed by: STUDENT IN AN ORGANIZED HEALTH CARE EDUCATION/TRAINING PROGRAM

## 2023-07-29 RX ADMIN — HALOPERIDOL 10 MG: 10 TABLET ORAL at 18:02

## 2023-07-29 RX ADMIN — MIRTAZAPINE 30 MG: 15 TABLET, FILM COATED ORAL at 21:21

## 2023-07-29 RX ADMIN — TRAZODONE HYDROCHLORIDE 50 MG: 50 TABLET ORAL at 21:21

## 2023-07-29 RX ADMIN — ESCITALOPRAM OXALATE 10 MG: 10 TABLET ORAL at 08:48

## 2023-07-29 RX ADMIN — OLANZAPINE 15 MG: 10 TABLET, FILM COATED ORAL at 21:21

## 2023-07-29 RX ADMIN — HALOPERIDOL 10 MG: 10 TABLET ORAL at 08:48

## 2023-07-29 RX ADMIN — NICOTINE POLACRILEX 4 MG: 4 GUM, CHEWING BUCCAL at 18:05

## 2023-07-29 RX ADMIN — BENZTROPINE MESYLATE 0.5 MG: 0.5 TABLET ORAL at 08:48

## 2023-07-29 RX ADMIN — BENZTROPINE MESYLATE 0.5 MG: 0.5 TABLET ORAL at 18:02

## 2023-07-29 NOTE — NURSING NOTE
Patient declined breakfast and morning group. He has remained in bed, napping but easily awakens if spoken to. He declines to talk at this time and states, "everything is fine." Denies SI/HI and reports his AH are manageable.

## 2023-07-29 NOTE — PROGRESS NOTES
Progress Note - [facility] 33 y.o. male MRN: [id number]  Unit/Bed#: [facility] 216-01 Encounter: [id number]    Chaz Ren was seen for follow-up, chart reviewed and discussed with nursing staff. Nursing staff notes he continues to report residual auditory hallucinations. Tends to be withdrawn to room and not participating in groups. Compliant with medications. No aggression or agitation. Sleeping well. Appetite is adequate. Patient is calm on approach. Continues to report auditory hallucinations but states they are slightly less. States that the voices are saying "please" and that they want to kill him. He denies any suicidal or homicidal ideation. Denies any visual hallucinations. Compliant with medications and overall feels as though the medications have been helpful. Reports his depression and anxiety is slightly improved. Physically no reports of pain or discomfort.     Behavior over the last 24 hours:  unchanged  Sleep: hypersomnia  Appetite: normal  Medication side effects: No  ROS: no complaints  /88 (BP Location: Right arm)   Pulse 103   Temp 97.6 °F (36.4 °C) (Tympanic)   Resp 18   Ht 5' 7" (1.702 m)   Wt 103 kg (226 lb 12.8 oz)   SpO2 99%   BMI 35.52 kg/m²     Medications:   Current Facility-Administered Medications   Medication Dose Route Frequency   • acetaminophen (TYLENOL) tablet 650 mg  650 mg Oral Q6H PRN   • acetaminophen (TYLENOL) tablet 650 mg  650 mg Oral Q4H PRN   • acetaminophen (TYLENOL) tablet 975 mg  975 mg Oral Q6H PRN   • aluminum-magnesium hydroxide-simethicone (MAALOX) oral suspension 30 mL  30 mL Oral Q4H PRN   • haloperidol lactate (HALDOL) injection 2.5 mg  2.5 mg Intramuscular Q4H PRN Max 4/day    And   • LORazepam (ATIVAN) injection 1 mg  1 mg Intramuscular Q4H PRN Max 4/day    And   • benztropine (COGENTIN) injection 0.5 mg  0.5 mg Intramuscular Q4H PRN Max 4/day   • haloperidol lactate (HALDOL) injection 5 mg  5 mg Intramuscular Q4H PRN Max 4/day    And   • LORazepam (ATIVAN) injection 2 mg  2 mg Intramuscular Q4H PRN Max 4/day    And   • benztropine (COGENTIN) injection 1 mg  1 mg Intramuscular Q4H PRN Max 4/day   • benztropine (COGENTIN) tablet 0.5 mg  0.5 mg Oral BID   • benztropine (COGENTIN) tablet 1 mg  1 mg Oral Q4H PRN Max 6/day   • bisacodyl (DULCOLAX) rectal suppository 10 mg  10 mg Rectal Daily PRN   • hydrOXYzine HCL (ATARAX) tablet 50 mg  50 mg Oral Q6H PRN Max 4/day    Or   • diphenhydrAMINE (BENADRYL) injection 50 mg  50 mg Intramuscular Q6H PRN   • escitalopram (LEXAPRO) tablet 10 mg  10 mg Oral Daily   • haloperidol (HALDOL) tablet 1 mg  1 mg Oral Q6H PRN   • haloperidol (HALDOL) tablet 10 mg  10 mg Oral BID   • haloperidol (HALDOL) tablet 2.5 mg  2.5 mg Oral Q4H PRN Max 4/day   • haloperidol (HALDOL) tablet 5 mg  5 mg Oral Q4H PRN Max 4/day   • hydrOXYzine HCL (ATARAX) tablet 100 mg  100 mg Oral Q6H PRN Max 4/day    Or   • LORazepam (ATIVAN) injection 2 mg  2 mg Intramuscular Q6H PRN   • hydrOXYzine HCL (ATARAX) tablet 25 mg  25 mg Oral Q6H PRN Max 4/day   • mirtazapine (REMERON) tablet 30 mg  30 mg Oral HS   • nicotine polacrilex (NICORETTE) gum 4 mg  4 mg Oral Q2H PRN   • OLANZapine (ZyPREXA) tablet 15 mg  15 mg Oral HS   • polyethylene glycol (MIRALAX) packet 17 g  17 g Oral Daily PRN   • senna-docusate sodium (SENOKOT S) 8.6-50 mg per tablet 1 tablet  1 tablet Oral Daily PRN   • traZODone (DESYREL) tablet 50 mg  50 mg Oral HS PRN       Labs:   Admission on 07/11/2023   Component Date Value Ref Range Status   • Color,  07/12/2023 Yellow   Final   • Clarity,  07/12/2023 Clear   Final   • Specific Gravity,  07/12/2023 1.025  1.005 - 1.030 Final   • pH, UA 07/12/2023 6.0  4.5, 5.0, 5.5, 6.0, 6.5, 7.0, 7.5, 8.0 Final   • Leukocytes, UA 07/12/2023 Negative  Negative Final   • Nitrite, UA 07/12/2023 Negative  Negative Final   • Protein, UA 07/12/2023 Negative  Negative mg/dl Final   • Glucose, UA 07/12/2023 Negative  Negative mg/dl Final   • Ketones, UA 07/12/2023 Negative  Negative mg/dl Final   • Urobilinogen, UA 07/12/2023 <2.0  <2.0 mg/dl mg/dl Final   • Bilirubin, UA 07/12/2023 Negative  Negative Final   • Occult Blood, UA 07/12/2023 Negative  Negative Final   • RBC, UA 07/12/2023 None Seen  None Seen, 2-4 /hpf Final   • WBC, UA 07/12/2023 None Seen  None Seen, 2-4, 5-60 /hpf Final   • Epithelial Cells 07/12/2023 Occasional  None Seen, Occasional /hpf Final   • Bacteria, UA 07/12/2023 None Seen  None Seen, Occasional /hpf Final   • WBC 07/13/2023 8.69  4.31 - 10.16 Thousand/uL Final   • RBC 07/13/2023 5.05  3.88 - 5.62 Million/uL Final   • Hemoglobin 07/13/2023 12.5  12.0 - 17.0 g/dL Final   • Hematocrit 07/13/2023 40.9  36.5 - 49.3 % Final   • MCV 07/13/2023 81 (L)  82 - 98 fL Final   • MCH 07/13/2023 24.8 (L)  26.8 - 34.3 pg Final   • MCHC 07/13/2023 30.6 (L)  31.4 - 37.4 g/dL Final   • RDW 07/13/2023 13.2  11.6 - 15.1 % Final   • MPV 07/13/2023 10.4  8.9 - 12.7 fL Final   • Platelets 62/98/2015 208  149 - 390 Thousands/uL Final   • nRBC 07/13/2023 0  /100 WBCs Final   • Neutrophils Relative 07/13/2023 46  43 - 75 % Final   • Immat GRANS % 07/13/2023 1  0 - 2 % Final   • Lymphocytes Relative 07/13/2023 32  14 - 44 % Final   • Monocytes Relative 07/13/2023 11  4 - 12 % Final   • Eosinophils Relative 07/13/2023 9 (H)  0 - 6 % Final   • Basophils Relative 07/13/2023 1  0 - 1 % Final   • Neutrophils Absolute 07/13/2023 4.09  1.85 - 7.62 Thousands/µL Final   • Immature Grans Absolute 07/13/2023 0.05  0.00 - 0.20 Thousand/uL Final   • Lymphocytes Absolute 07/13/2023 2.76  0.60 - 4.47 Thousands/µL Final   • Monocytes Absolute 07/13/2023 0.95  0.17 - 1.22 Thousand/µL Final   • Eosinophils Absolute 07/13/2023 0.78 (H)  0.00 - 0.61 Thousand/µL Final   • Basophils Absolute 07/13/2023 0.06  0.00 - 0.10 Thousands/µL Final   • Sodium 07/13/2023 139  135 - 147 mmol/L Final   • Potassium 07/13/2023 4.1  3.5 - 5.3 mmol/L Final   • Chloride 07/13/2023 105  96 - 108 mmol/L Final   • CO2 07/13/2023 27  21 - 32 mmol/L Final   • ANION GAP 07/13/2023 7  mmol/L Final   • BUN 07/13/2023 11  5 - 25 mg/dL Final   • Creatinine 07/13/2023 0.95  0.60 - 1.30 mg/dL Final   • Glucose 07/13/2023 81  65 - 140 mg/dL Final   • Glucose, Fasting 07/13/2023 81  65 - 99 mg/dL Final   • Calcium 07/13/2023 9.1  8.4 - 10.2 mg/dL Final   • AST 07/13/2023 59 (H)  13 - 39 U/L Final   • ALT 07/13/2023 163 (H)  7 - 52 U/L Final   • Alkaline Phosphatase 07/13/2023 89  34 - 104 U/L Final   • Total Protein 07/13/2023 6.9  6.4 - 8.4 g/dL Final   • Albumin 07/13/2023 3.9  3.5 - 5.0 g/dL Final   • Total Bilirubin 07/13/2023 0.44  0.20 - 1.00 mg/dL Final   • eGFR 07/13/2023 110  ml/min/1.73sq m Final   • Folate 07/13/2023 6.4  >5.9 ng/mL Final   • Cholesterol 07/13/2023 205 (H)  See Comment mg/dL Final   • Triglycerides 07/13/2023 86  See Comment mg/dL Final   • HDL, Direct 07/13/2023 48  >=40 mg/dL Final   • LDL Calculated 07/13/2023 140 (H)  0 - 100 mg/dL Final   • Non-HDL-Chol (CHOL-HDL) 07/13/2023 157  mg/dl Final   • TSH 3RD GENERATON 07/13/2023 1.705  0.450 - 4.500 uIU/mL Final   • Vitamin B-12 07/13/2023 250  180 - 914 pg/mL Final   • Vit D, 25-Hydroxy 07/13/2023 8.8 (L)  30.0 - 100.0 ng/mL Final   • Hemoglobin A1C 07/13/2023 4.7  Normal 3.8-5.6%; PreDiabetic 5.7-6.4%; Diabetic >=6.5%; Glycemic control for adults with diabetes <7.0% % Final   • EAG 07/13/2023 88  mg/dl Final   • Syphilis Total Antibody 07/13/2023 Non-reactive  Non-Reactive Final       Mental Status Evaluation:  Appearance:  age appropriate and disheveled   Behavior:  Calm, cooperative   Speech:  Soft, sparse   Mood:  anxious and depressed   Affect:  constricted   Thought Process:  concrete   Thought Content:     Associations: delusions  persecutory    [place]   Perceptual Disturbances:  Auditory hallucinations, no visual hallucinations   Risk Potential: Suicidal Ideations none, Homicidal Ideations none and Potential for Aggression No   Sensorium:  person, place and time/date   Cognition:  recent and remote memory grossly intact   Consciousness:  alert and awake    Attention: attention span appeared shorter than expected for age   Insight:  limited   Judgment: limited   Gait/Station: normal gait/station   Motor Activity: no abnormal movements     Progress Toward Goals: Gradually progressing    Assessment/Plan   Principal Problem:    Schizophrenia ([address])  Active Problems:    Medical clearance for psychiatric admission    Tobacco abuse    T wave inversion in EKG      Recommended Treatment:  Cogentin 0.5 mg twice daily  Haldol 10 mg twice daily  Lexapro 10 mg daily  Mirtazapine 30 mg at bedtime  Olanzapine 15 mg at bedtime    Continue with group therapy, milieu therapy and occupational therapy. Risks, benefits and possible side effects of Medications:   Risks, benefits, and possible side effects of medications explained to patient and patient verbalizes understanding. Counseling / Coordination of Care  Total floor / unit time spent today 25 minutes. Greater than 50% of total time was spent with the patient and / or family counseling and / or coordination of care.  A description of the counseling / coordination of care: Medication management, chart review, patient interview

## 2023-07-29 NOTE — NURSING NOTE
Pt received Trazodone 50 mg PO at 2110 for sleep. PRN effective, pt appears to be sleeping on reassessment.

## 2023-07-29 NOTE — TREATMENT TEAM
07/29/23 0800   Team Meeting   Meeting Type Daily Rounds   Team Members Present   Team Members Present Physician;Nurse   Physician Team Member [id number]  [facility]   Nursing Team Member Cony   Patient/Family Present   Patient Present No   Patient's Family Present No     AM Rounds:  Naps during day, visible, social in afternoon/evening. +AH-baseline.

## 2023-07-29 NOTE — PLAN OF CARE
Problem: Alteration in Thoughts and Perception  Goal: Treatment Goal: Gain control of psychotic behaviors/thinking, reduce/eliminate presenting symptoms and demonstrate improved reality functioning upon discharge  Outcome: Progressing  Goal: Verbalize thoughts and feelings  Description: Interventions:  - Promote a nonjudgmental and trusting relationship with the patient through active listening and therapeutic communication  - Assess patient's level of functioning, behavior and potential for risk  - Engage patient in 1 on 1 interactions  - Encourage patient to express fears, feelings, frustrations, and discuss symptoms    - [place] patient to reality, help patient recognize reality-based thinking   - Administer medications as ordered and assess for potential side effects  - Provide the patient education related to the signs and symptoms of the illness and desired effects of prescribed medications  Outcome: Progressing  Goal: Refrain from acting on delusional thinking/internal stimuli  Description: Interventions:  - Monitor patient closely, per order   - Utilize least restrictive measures   - Set reasonable limits, give positive feedback for acceptable   - Administer medications as ordered and monitor of potential side effects  Outcome: Progressing  Goal: Agree to be compliant with medication regime, as prescribed and report medication side effects  Description: Interventions:  - Offer appropriate PRN medication and supervise ingestion; conduct AIMS, as needed   Outcome: Progressing  Goal: Attend and participate in unit activities, including therapeutic, recreational, and educational groups  Description: Interventions:  -Encourage Visitation and family involvement in care  Outcome: Progressing  Goal: Recognize dysfunctional thoughts, communicate reality-based thoughts at the time of discharge  Description: Interventions:  - Provide medication and psycho-education to assist patient in compliance and developing insight into his/her illness   Outcome: Progressing  Goal: Complete daily ADLs, including personal hygiene independently, as able  Description: Interventions:  - Observe, teach, and assist patient with ADLS  - Monitor and promote a balance of rest/activity, with adequate nutrition and elimination   Outcome: Progressing

## 2023-07-30 PROCEDURE — 99232 SBSQ HOSP IP/OBS MODERATE 35: CPT | Performed by: STUDENT IN AN ORGANIZED HEALTH CARE EDUCATION/TRAINING PROGRAM

## 2023-07-30 RX ADMIN — BENZTROPINE MESYLATE 0.5 MG: 0.5 TABLET ORAL at 17:42

## 2023-07-30 RX ADMIN — ESCITALOPRAM OXALATE 10 MG: 10 TABLET ORAL at 09:21

## 2023-07-30 RX ADMIN — MIRTAZAPINE 30 MG: 15 TABLET, FILM COATED ORAL at 21:23

## 2023-07-30 RX ADMIN — NICOTINE POLACRILEX 4 MG: 4 GUM, CHEWING BUCCAL at 17:45

## 2023-07-30 RX ADMIN — BENZTROPINE MESYLATE 0.5 MG: 0.5 TABLET ORAL at 09:21

## 2023-07-30 RX ADMIN — OLANZAPINE 15 MG: 10 TABLET, FILM COATED ORAL at 21:23

## 2023-07-30 RX ADMIN — HALOPERIDOL 10 MG: 10 TABLET ORAL at 09:21

## 2023-07-30 RX ADMIN — HALOPERIDOL 10 MG: 10 TABLET ORAL at 17:42

## 2023-07-30 RX ADMIN — TRAZODONE HYDROCHLORIDE 50 MG: 50 TABLET ORAL at 21:24

## 2023-07-30 NOTE — PROGRESS NOTES
Progress Note - [facility] 33 y.o. male MRN: [id number]  Unit/Bed#: [facility] 216-01 Encounter: [id number]    Romana Myrtle was seen for follow-up, chart reviewed and discussed with nursing staff. Nursing staff notes he has residual auditory hallucinations that he reports are at "baseline ". He slept after receiving trazodone. Tends to be isolative to self and to room. Adequate appetite. No aggression or agitation. Patient is calm and cooperative on approach. Reports that overall he has been improving since being in the hospital.  States that he continues with auditory hallucinations and anxiety that he states are increased at night. No command hallucinations. Denies any suicidal or homicidal ideation. He did sleep well last night. Appetite is adequate. Attended our group yesterday which he enjoyed. Physically he denies any pain or discomfort. No adverse effects noted with his medications.     Behavior over the last 24 hours:  unchanged  Sleep: hypersomnia  Appetite: normal  Medication side effects: No  ROS: no complaints  /86 (BP Location: Right arm)   Pulse 82   Temp 98.1 °F (36.7 °C) (Tympanic)   Resp 16   Ht 5' 7" (1.702 m)   Wt 103 kg (226 lb 12.8 oz)   SpO2 100%   BMI 35.52 kg/m²     Medications:   Current Facility-Administered Medications   Medication Dose Route Frequency   • acetaminophen (TYLENOL) tablet 650 mg  650 mg Oral Q6H PRN   • acetaminophen (TYLENOL) tablet 650 mg  650 mg Oral Q4H PRN   • acetaminophen (TYLENOL) tablet 975 mg  975 mg Oral Q6H PRN   • aluminum-magnesium hydroxide-simethicone (MAALOX) oral suspension 30 mL  30 mL Oral Q4H PRN   • haloperidol lactate (HALDOL) injection 2.5 mg  2.5 mg Intramuscular Q4H PRN Max 4/day    And   • LORazepam (ATIVAN) injection 1 mg  1 mg Intramuscular Q4H PRN Max 4/day    And   • benztropine (COGENTIN) injection 0.5 mg  0.5 mg Intramuscular Q4H PRN Max 4/day   • haloperidol lactate (HALDOL) injection 5 mg  5 mg Intramuscular Q4H PRN Max 4/day    And   • LORazepam (ATIVAN) injection 2 mg  2 mg Intramuscular Q4H PRN Max 4/day    And   • benztropine (COGENTIN) injection 1 mg  1 mg Intramuscular Q4H PRN Max 4/day   • benztropine (COGENTIN) tablet 0.5 mg  0.5 mg Oral BID   • benztropine (COGENTIN) tablet 1 mg  1 mg Oral Q4H PRN Max 6/day   • bisacodyl (DULCOLAX) rectal suppository 10 mg  10 mg Rectal Daily PRN   • hydrOXYzine HCL (ATARAX) tablet 50 mg  50 mg Oral Q6H PRN Max 4/day    Or   • diphenhydrAMINE (BENADRYL) injection 50 mg  50 mg Intramuscular Q6H PRN   • escitalopram (LEXAPRO) tablet 10 mg  10 mg Oral Daily   • haloperidol (HALDOL) tablet 1 mg  1 mg Oral Q6H PRN   • haloperidol (HALDOL) tablet 10 mg  10 mg Oral BID   • haloperidol (HALDOL) tablet 2.5 mg  2.5 mg Oral Q4H PRN Max 4/day   • haloperidol (HALDOL) tablet 5 mg  5 mg Oral Q4H PRN Max 4/day   • hydrOXYzine HCL (ATARAX) tablet 100 mg  100 mg Oral Q6H PRN Max 4/day    Or   • LORazepam (ATIVAN) injection 2 mg  2 mg Intramuscular Q6H PRN   • hydrOXYzine HCL (ATARAX) tablet 25 mg  25 mg Oral Q6H PRN Max 4/day   • mirtazapine (REMERON) tablet 30 mg  30 mg Oral HS   • nicotine polacrilex (NICORETTE) gum 4 mg  4 mg Oral Q2H PRN   • OLANZapine (ZyPREXA) tablet 15 mg  15 mg Oral HS   • polyethylene glycol (MIRALAX) packet 17 g  17 g Oral Daily PRN   • senna-docusate sodium (SENOKOT S) 8.6-50 mg per tablet 1 tablet  1 tablet Oral Daily PRN   • traZODone (DESYREL) tablet 50 mg  50 mg Oral HS PRN       Labs:   Admission on 07/11/2023   Component Date Value Ref Range Status   • Color,  07/12/2023 Yellow   Final   • Clarity,  07/12/2023 Clear   Final   • Specific Gravity,  07/12/2023 1.025  1.005 - 1.030 Final   • pH, UA 07/12/2023 6.0  4.5, 5.0, 5.5, 6.0, 6.5, 7.0, 7.5, 8.0 Final   • Leukocytes, UA 07/12/2023 Negative  Negative Final   • Nitrite, UA 07/12/2023 Negative  Negative Final   • Protein, UA 07/12/2023 Negative  Negative mg/dl Final   • Glucose, UA 07/12/2023 Negative  Negative mg/dl Final   • Ketones, UA 07/12/2023 Negative  Negative mg/dl Final   • Urobilinogen, UA 07/12/2023 <2.0  <2.0 mg/dl mg/dl Final   • Bilirubin, UA 07/12/2023 Negative  Negative Final   • Occult Blood, UA 07/12/2023 Negative  Negative Final   • RBC, UA 07/12/2023 None Seen  None Seen, 2-4 /hpf Final   • WBC, UA 07/12/2023 None Seen  None Seen, 2-4, 5-60 /hpf Final   • Epithelial Cells 07/12/2023 Occasional  None Seen, Occasional /hpf Final   • Bacteria, UA 07/12/2023 None Seen  None Seen, Occasional /hpf Final   • WBC 07/13/2023 8.69  4.31 - 10.16 Thousand/uL Final   • RBC 07/13/2023 5.05  3.88 - 5.62 Million/uL Final   • Hemoglobin 07/13/2023 12.5  12.0 - 17.0 g/dL Final   • Hematocrit 07/13/2023 40.9  36.5 - 49.3 % Final   • MCV 07/13/2023 81 (L)  82 - 98 fL Final   • MCH 07/13/2023 24.8 (L)  26.8 - 34.3 pg Final   • MCHC 07/13/2023 30.6 (L)  31.4 - 37.4 g/dL Final   • RDW 07/13/2023 13.2  11.6 - 15.1 % Final   • MPV 07/13/2023 10.4  8.9 - 12.7 fL Final   • Platelets 97/27/9591 208  149 - 390 Thousands/uL Final   • nRBC 07/13/2023 0  /100 WBCs Final   • Neutrophils Relative 07/13/2023 46  43 - 75 % Final   • Immat GRANS % 07/13/2023 1  0 - 2 % Final   • Lymphocytes Relative 07/13/2023 32  14 - 44 % Final   • Monocytes Relative 07/13/2023 11  4 - 12 % Final   • Eosinophils Relative 07/13/2023 9 (H)  0 - 6 % Final   • Basophils Relative 07/13/2023 1  0 - 1 % Final   • Neutrophils Absolute 07/13/2023 4.09  1.85 - 7.62 Thousands/µL Final   • Immature Grans Absolute 07/13/2023 0.05  0.00 - 0.20 Thousand/uL Final   • Lymphocytes Absolute 07/13/2023 2.76  0.60 - 4.47 Thousands/µL Final   • Monocytes Absolute 07/13/2023 0.95  0.17 - 1.22 Thousand/µL Final   • Eosinophils Absolute 07/13/2023 0.78 (H)  0.00 - 0.61 Thousand/µL Final   • Basophils Absolute 07/13/2023 0.06  0.00 - 0.10 Thousands/µL Final   • Sodium 07/13/2023 139  135 - 147 mmol/L Final   • Potassium 07/13/2023 4.1  3.5 - 5.3 mmol/L Final   • Chloride 07/13/2023 105  96 - 108 mmol/L Final   • CO2 07/13/2023 27  21 - 32 mmol/L Final   • ANION GAP 07/13/2023 7  mmol/L Final   • BUN 07/13/2023 11  5 - 25 mg/dL Final   • Creatinine 07/13/2023 0.95  0.60 - 1.30 mg/dL Final   • Glucose 07/13/2023 81  65 - 140 mg/dL Final   • Glucose, Fasting 07/13/2023 81  65 - 99 mg/dL Final   • Calcium 07/13/2023 9.1  8.4 - 10.2 mg/dL Final   • AST 07/13/2023 59 (H)  13 - 39 U/L Final   • ALT 07/13/2023 163 (H)  7 - 52 U/L Final   • Alkaline Phosphatase 07/13/2023 89  34 - 104 U/L Final   • Total Protein 07/13/2023 6.9  6.4 - 8.4 g/dL Final   • Albumin 07/13/2023 3.9  3.5 - 5.0 g/dL Final   • Total Bilirubin 07/13/2023 0.44  0.20 - 1.00 mg/dL Final   • eGFR 07/13/2023 110  ml/min/1.73sq m Final   • Folate 07/13/2023 6.4  >5.9 ng/mL Final   • Cholesterol 07/13/2023 205 (H)  See Comment mg/dL Final   • Triglycerides 07/13/2023 86  See Comment mg/dL Final   • HDL, Direct 07/13/2023 48  >=40 mg/dL Final   • LDL Calculated 07/13/2023 140 (H)  0 - 100 mg/dL Final   • Non-HDL-Chol (CHOL-HDL) 07/13/2023 157  mg/dl Final   • TSH 3RD GENERATON 07/13/2023 1.705  0.450 - 4.500 uIU/mL Final   • Vitamin B-12 07/13/2023 250  180 - 914 pg/mL Final   • Vit D, 25-Hydroxy 07/13/2023 8.8 (L)  30.0 - 100.0 ng/mL Final   • Hemoglobin A1C 07/13/2023 4.7  Normal 3.8-5.6%; PreDiabetic 5.7-6.4%;  Diabetic >=6.5%; Glycemic control for adults with diabetes <7.0% % Final   • EAG 07/13/2023 88  mg/dl Final   • Syphilis Total Antibody 07/13/2023 Non-reactive  Non-Reactive Final       Mental Status Evaluation:  Appearance:  age appropriate and Borderline hygiene   Behavior:  Calm, cooperative, fair eye contact   Speech:  normal pitch and normal volume   Mood:  anxious and dysthymic   Affect:  constricted and mood-congruent   Thought Process:  concrete   Thought Content:     Associations: No delusions were voiced    [place]   Perceptual Disturbances: Residual auditory hallucinations, denies command hallucinations, no visual hallucinations   Risk Potential: Suicidal Ideations none, Homicidal Ideations none and Potential for Aggression No   Sensorium:  person, place and time/date   Cognition:  recent and remote memory grossly intact   Consciousness:  alert and awake    Attention: attention span appeared shorter than expected for age   Insight:  limited   Judgment: limited   Gait/Station: Not observed, lying in bed   Motor Activity: no abnormal movements     Progress Toward Goals: Gradually progressing    Assessment/Plan   Principal Problem:    Schizophrenia ([address])  Active Problems:    Medical clearance for psychiatric admission    Tobacco abuse    T wave inversion in EKG      Recommended Treatment:  Cogentin 0.5 mg twice daily  Haldol 10 mg twice daily  Lexapro 10 mg daily  Mirtazapine 30 mg at bedtime  Olanzapine 15 mg at bedtime    Continue with group therapy, milieu therapy and occupational therapy. Risks, benefits and possible side effects of Medications:   Risks, benefits, and possible side effects of medications explained to patient and patient verbalizes understanding. Counseling / Coordination of Care  Total floor / unit time spent today 25 minutes. Greater than 50% of total time was spent with the patient and / or family counseling and / or coordination of care.  A description of the counseling / coordination of care: Medication management, chart review, patient interviewed

## 2023-07-30 NOTE — NURSING NOTE
Patient appeared to have slept throughout the night without difficulty. All safety precaution maintained.

## 2023-07-30 NOTE — NURSING NOTE
Pt resting in bed this morning, declined breakfast. Prompted OOB for medications. Pt reports feeling tired, states being bored as well. +AH, states sleeping help pt to not listen. Denies SI, HI, VH.

## 2023-07-30 NOTE — TREATMENT TEAM
07/30/23 0900   Team Meeting   Meeting Type Daily Rounds   Team Members Present   Team Members Present Physician;Nurse   Physician Team Member [id number]  [facility]   Nursing Team Member Hari   Patient/Family Present   Patient Present No   Patient's Family Present No       AM rounds- denies SI/HI, reports AH are at baseline and not bothersome. Slept well with trazodone. show 0 = understands/communicates without difficulty

## 2023-07-31 PROCEDURE — 99232 SBSQ HOSP IP/OBS MODERATE 35: CPT | Performed by: STUDENT IN AN ORGANIZED HEALTH CARE EDUCATION/TRAINING PROGRAM

## 2023-07-31 RX ADMIN — ESCITALOPRAM OXALATE 10 MG: 10 TABLET ORAL at 09:26

## 2023-07-31 RX ADMIN — MIRTAZAPINE 30 MG: 15 TABLET, FILM COATED ORAL at 21:16

## 2023-07-31 RX ADMIN — OLANZAPINE 15 MG: 10 TABLET, FILM COATED ORAL at 21:16

## 2023-07-31 RX ADMIN — TRAZODONE HYDROCHLORIDE 50 MG: 50 TABLET ORAL at 21:16

## 2023-07-31 RX ADMIN — NICOTINE POLACRILEX 4 MG: 4 GUM, CHEWING BUCCAL at 17:28

## 2023-07-31 RX ADMIN — HALOPERIDOL 10 MG: 10 TABLET ORAL at 17:28

## 2023-07-31 RX ADMIN — HALOPERIDOL 10 MG: 10 TABLET ORAL at 09:25

## 2023-07-31 RX ADMIN — BENZTROPINE MESYLATE 0.5 MG: 0.5 TABLET ORAL at 17:27

## 2023-07-31 RX ADMIN — BENZTROPINE MESYLATE 0.5 MG: 0.5 TABLET ORAL at 09:26

## 2023-07-31 NOTE — PROGRESS NOTES
Pt naps during the day, more social and visible in the evening. Reported AH is at baseline. DC: TBD      07/31/23 0756   Team Meeting   Meeting Type Daily Rounds   Team Members Present   Team Members Present Physician;Nurse;; Other (Discipline and Name)   Physician Team Member Dr. Kath Turner / Dr. Hannah Michael / Gab Rojas / [facility] Team Member Melyssa Humphries / [facility] Management Team Member Tank Cheek / Minnie Shanks / Bertin Tineo   Other (Discipline and Name) Jamie Coffey - Art Therapy   Patient/Family Present   Patient Present No   Patient's Family Present No

## 2023-07-31 NOTE — NURSING NOTE
Approached pt in the activity room where he was engaging with peers. Individual was cooperative with assessment though appeared withdrawn with flat affect. He verbalized that he continues to have AH without any commands or negative voices. Denies SI/HI. He mentioned that his coping mechanisms include walking and relying on his Faith. He states that he is sleeping well without any interruptions and is eating well. However, he does feel "down" and depressed.

## 2023-07-31 NOTE — PLAN OF CARE
Problem: Alteration in Thoughts and Perception  Goal: Treatment Goal: Gain control of psychotic behaviors/thinking, reduce/eliminate presenting symptoms and demonstrate improved reality functioning upon discharge  Outcome: Progressing  Goal: Verbalize thoughts and feelings  Description: Interventions:  - Promote a nonjudgmental and trusting relationship with the patient through active listening and therapeutic communication  - Assess patient's level of functioning, behavior and potential for risk  - Engage patient in 1 on 1 interactions  - Encourage patient to express fears, feelings, frustrations, and discuss symptoms    - [place] patient to reality, help patient recognize reality-based thinking   - Administer medications as ordered and assess for potential side effects  - Provide the patient education related to the signs and symptoms of the illness and desired effects of prescribed medications  Outcome: Progressing  Goal: Refrain from acting on delusional thinking/internal stimuli  Description: Interventions:  - Monitor patient closely, per order   - Utilize least restrictive measures   - Set reasonable limits, give positive feedback for acceptable   - Administer medications as ordered and monitor of potential side effects  Outcome: Progressing  Goal: Agree to be compliant with medication regime, as prescribed and report medication side effects  Description: Interventions:  - Offer appropriate PRN medication and supervise ingestion; conduct AIMS, as needed   Outcome: Progressing  Goal: Attend and participate in unit activities, including therapeutic, recreational, and educational groups  Description: Interventions:  -Encourage Visitation and family involvement in care  Outcome: Progressing  Goal: Recognize dysfunctional thoughts, communicate reality-based thoughts at the time of discharge  Description: Interventions:  - Provide medication and psycho-education to assist patient in compliance and developing insight into his/her illness   Outcome: Progressing  Goal: Complete daily ADLs, including personal hygiene independently, as able  Description: Interventions:  - Observe, teach, and assist patient with ADLS  - Monitor and promote a balance of rest/activity, with adequate nutrition and elimination   Outcome: Progressing     Problem: Ineffective Coping  Goal: Identifies ineffective coping skills  Outcome: Progressing  Goal: Identifies healthy coping skills  Outcome: Progressing  Goal: Demonstrates healthy coping skills  Outcome: Progressing  Goal: Participates in unit activities  Description: Interventions:  - Provide therapeutic environment   - Provide required programming   - Redirect inappropriate behaviors   Outcome: Progressing  Goal: Patient/Family participate in treatment and DC plans  Description: Interventions:  - Provide therapeutic environment  Outcome: Progressing  Goal: Patient/Family verbalizes awareness of resources  Outcome: Progressing  Goal: Understands least restrictive measures  Description: Interventions:  - Utilize least restrictive behavior  Outcome: Progressing  Goal: Free from restraint events  Description: - Utilize least restrictive measures   - Provide behavioral interventions   - Redirect inappropriate behaviors   Outcome: Progressing     Problem: Alteration in Orientation  Goal: Treatment Goal: Demonstrate a reduction of confusion and improved orientation to person, place, time and/or situation upon discharge, according to optimum baseline/ability  Outcome: Progressing  Goal: Interact with staff daily  Description: Interventions:  - Assess and re-assess patient's level of orientation  - Engage patient in 1 on 1 interactions, daily, for a minimum of 15 minutes   - Establish rapport/trust with patient   Outcome: Progressing  Goal: Express concerns related to confused thinking related to:  Description: Interventions:  - Encourage patient to express feelings, fears, frustrations, hopes  - Assign consistent caregivers   - [place]/re-orient patient as needed  - Allow comfort items, as appropriate  - Provide visual cues, signs, etc.   Outcome: Progressing  Goal: Allow medical examinations, as recommended  Description: Interventions:  - Provide physical/neurological exams and/or referrals, per provider   Outcome: Progressing  Goal: Cooperate with recommended testing/procedures  Description: Interventions:  - Determine need for ancillary testing  - Observe for mental status changes  - Implement falls/precaution protocol   Outcome: Progressing  Goal: Attend and participate in unit activities, including therapeutic, recreational, and educational groups  Description: Interventions:  - Provide therapeutic and educational activities daily, encourage attendance and participation, and document same in the medical record   - Provide appropriate opportunities for reminiscence   - Provide a consistent daily routine   - Encourage family contact/visitation   Outcome: Progressing  Goal: Complete daily ADLs, including personal hygiene independently, as able  Description: Interventions:  - Observe, teach, and assist patient with ADLS  Outcome: Progressing

## 2023-07-31 NOTE — NURSING NOTE
Pt denies current SI/HI/AVH. Pt isolative to bed except for meals. Pt is medication compliant. Pt continues to report AH at baseline which are manageable.

## 2023-07-31 NOTE — PROGRESS NOTES
Progress Note - [facility] 33 y.o. male MRN: [id number]  Unit/Bed#: [facility] 216-01 Encounter: [id number]    Assessment/Plan   Principal Problem:    Schizophrenia [facility])  Active Problems:    Medical clearance for psychiatric admission    Tobacco abuse    T wave inversion in EKG      Subjective: Patient was seen, chart was reviewed, and case was discussed with the team. Patient appears withdrawn, isolative and disheveled. He continues to endorse paranoia, intrusive thoughts and voices telling that somebody is going to kill him. He doesn't feel safe outside the hospital/. He is compliant with medications. Denies any side effects.    Behavior over the last 24 hours:  unchanged  Sleep: normal  Appetite: normal  Medication side effects: No    Medical ROS: Pertinent items are noted in HPI.all other systems are negative    Current Medications:  Current Facility-Administered Medications   Medication Dose Route Frequency   • acetaminophen (TYLENOL) tablet 650 mg  650 mg Oral Q6H PRN   • acetaminophen (TYLENOL) tablet 650 mg  650 mg Oral Q4H PRN   • acetaminophen (TYLENOL) tablet 975 mg  975 mg Oral Q6H PRN   • aluminum-magnesium hydroxide-simethicone (MAALOX) oral suspension 30 mL  30 mL Oral Q4H PRN   • haloperidol lactate (HALDOL) injection 2.5 mg  2.5 mg Intramuscular Q4H PRN Max 4/day    And   • LORazepam (ATIVAN) injection 1 mg  1 mg Intramuscular Q4H PRN Max 4/day    And   • benztropine (COGENTIN) injection 0.5 mg  0.5 mg Intramuscular Q4H PRN Max 4/day   • haloperidol lactate (HALDOL) injection 5 mg  5 mg Intramuscular Q4H PRN Max 4/day    And   • LORazepam (ATIVAN) injection 2 mg  2 mg Intramuscular Q4H PRN Max 4/day    And   • benztropine (COGENTIN) injection 1 mg  1 mg Intramuscular Q4H PRN Max 4/day   • benztropine (COGENTIN) tablet 0.5 mg  0.5 mg Oral BID   • benztropine (COGENTIN) tablet 1 mg  1 mg Oral Q4H PRN Max 6/day   • bisacodyl (DULCOLAX) rectal suppository 10 mg  10 mg Rectal Daily PRN • hydrOXYzine HCL (ATARAX) tablet 50 mg  50 mg Oral Q6H PRN Max 4/day    Or   • diphenhydrAMINE (BENADRYL) injection 50 mg  50 mg Intramuscular Q6H PRN   • escitalopram (LEXAPRO) tablet 10 mg  10 mg Oral Daily   • haloperidol (HALDOL) tablet 1 mg  1 mg Oral Q6H PRN   • haloperidol (HALDOL) tablet 10 mg  10 mg Oral BID   • haloperidol (HALDOL) tablet 2.5 mg  2.5 mg Oral Q4H PRN Max 4/day   • haloperidol (HALDOL) tablet 5 mg  5 mg Oral Q4H PRN Max 4/day   • hydrOXYzine HCL (ATARAX) tablet 100 mg  100 mg Oral Q6H PRN Max 4/day    Or   • LORazepam (ATIVAN) injection 2 mg  2 mg Intramuscular Q6H PRN   • hydrOXYzine HCL (ATARAX) tablet 25 mg  25 mg Oral Q6H PRN Max 4/day   • mirtazapine (REMERON) tablet 30 mg  30 mg Oral HS   • nicotine polacrilex (NICORETTE) gum 4 mg  4 mg Oral Q2H PRN   • OLANZapine (ZyPREXA) tablet 15 mg  15 mg Oral HS   • polyethylene glycol (MIRALAX) packet 17 g  17 g Oral Daily PRN   • senna-docusate sodium (SENOKOT S) 8.6-50 mg per tablet 1 tablet  1 tablet Oral Daily PRN   • traZODone (DESYREL) tablet 50 mg  50 mg Oral HS PRN       Behavioral Health Medications:   all current active meds have been reviewed. Vitals:  Vitals:    07/31/23 0800   BP: 132/78   Pulse: 81   Resp: 17   Temp: (!) 97.2 °F (36.2 °C)   SpO2: 100%       Laboratory results:    I have personally reviewed all pertinent laboratory/tests results. Mental Status Evaluation:    Appearance:  age appropriate   Behavior:  guarded   Speech:  soft   Mood:  anxious and depressed   Affect:  constricted and flat   Thought Process:  goal directed   Thought Content:  delusions  persecutory   Perceptual Disturbances:  Auditory hallucinations without commands   Risk Potential: Suicidal Ideations none  Homicidal Ideations none  Potential for Aggression No   Sensorium:  person, place and time/date   Memory:  recent and remote memory grossly intact   Consciousness:  alert and awake    Attention: attention span appeared shorter than expected for age   Insight:  limited   Judgment: limited   Gait/Station: normal gait/station   Motor Activity: no abnormal movements       Progress Toward Goals: Progressing    Recommended Treatment: Continue with pharmacotherapy, group therapy, milieu therapy and occupational therapy. Risks, benefits and possible side effects of Medications:   Risks, benefits, alternatives, and possible side effects of patient's psychiatric medications were discussed with patient.

## 2023-07-31 NOTE — NURSING NOTE
Jere Mathews is visualized resting in bed and napping at the beginning of the evening. Patient did come out of his room and joined peers for dinner in the dining room. Jere Mathews is observed having limited social interactions with peers. Patient denies current SI/HI/VH, endorses ongoing AH, but reports these remain unchanged at this time. Jere Mathews remains withdrawn to his room and was encouraged to join peers in day room for group, but politely declined. Alberto rejoined peers in day room to watch a movie and then played card games. Positive feedback provided r/t increased visibility in the milieu. Patient was encouraged to seek staff with any questions and/or concerns, verbalized understanding.

## 2023-07-31 NOTE — PROGRESS NOTES
Attended group alongside other people on the unit, participated in discussion around recovery-centered topic, and contributed their own lived experience toward connection between group members. 07/28/23 4208   Activity/Group Checklist   Group Other (Comment)  (3:30 PM Goal Accomplishments Group)   Attendance Attended  (Arrived after this group started, stayed until end of this group.)   Attendance Duration (min) 16-30   Interactions Interacted appropriately   Affect/Mood Appropriate   Goals Achieved Other (Comment)  (Engaged with CPS and peers on the unit by identifying natural supports who could learn about mental health from recommend books.  Thoughtfully chose and named preventative resources that support safety, comparing ‘what if’s to ‘what not’s.)

## 2023-07-31 NOTE — PLAN OF CARE
Problem: Alteration in Thoughts and Perception  Goal: Verbalize thoughts and feelings  Description: Interventions:  - Promote a nonjudgmental and trusting relationship with the patient through active listening and therapeutic communication  - Assess patient's level of functioning, behavior and potential for risk  - Engage patient in 1 on 1 interactions  - Encourage patient to express fears, feelings, frustrations, and discuss symptoms    - [place] patient to reality, help patient recognize reality-based thinking   - Administer medications as ordered and assess for potential side effects  - Provide the patient education related to the signs and symptoms of the illness and desired effects of prescribed medications  Outcome: Progressing  Goal: Refrain from acting on delusional thinking/internal stimuli  Description: Interventions:  - Monitor patient closely, per order   - Utilize least restrictive measures   - Set reasonable limits, give positive feedback for acceptable   - Administer medications as ordered and monitor of potential side effects  Outcome: Progressing  Goal: Agree to be compliant with medication regime, as prescribed and report medication side effects  Description: Interventions:  - Offer appropriate PRN medication and supervise ingestion; conduct AIMS, as needed   Outcome: Progressing

## 2023-07-31 NOTE — NURSING NOTE
Pt requested and received Trazodone 50 mg PO at 2124 for sleep. PRN effective, pt appears to be sleeping on reassessment.

## 2023-08-01 PROCEDURE — 99232 SBSQ HOSP IP/OBS MODERATE 35: CPT | Performed by: STUDENT IN AN ORGANIZED HEALTH CARE EDUCATION/TRAINING PROGRAM

## 2023-08-01 RX ORDER — OLANZAPINE 10 MG/1
20 TABLET ORAL
Status: DISCONTINUED | OUTPATIENT
Start: 2023-08-01 | End: 2023-08-15 | Stop reason: HOSPADM

## 2023-08-01 RX ADMIN — MIRTAZAPINE 30 MG: 15 TABLET, FILM COATED ORAL at 21:26

## 2023-08-01 RX ADMIN — NICOTINE POLACRILEX 4 MG: 4 GUM, CHEWING BUCCAL at 17:08

## 2023-08-01 RX ADMIN — TRAZODONE HYDROCHLORIDE 50 MG: 50 TABLET ORAL at 21:26

## 2023-08-01 RX ADMIN — BENZTROPINE MESYLATE 0.5 MG: 0.5 TABLET ORAL at 17:07

## 2023-08-01 RX ADMIN — HALOPERIDOL 10 MG: 10 TABLET ORAL at 17:07

## 2023-08-01 RX ADMIN — ESCITALOPRAM OXALATE 10 MG: 10 TABLET ORAL at 09:49

## 2023-08-01 RX ADMIN — HALOPERIDOL 10 MG: 10 TABLET ORAL at 09:49

## 2023-08-01 RX ADMIN — BENZTROPINE MESYLATE 0.5 MG: 0.5 TABLET ORAL at 09:49

## 2023-08-01 RX ADMIN — OLANZAPINE 20 MG: 10 TABLET, FILM COATED ORAL at 21:25

## 2023-08-01 NOTE — PROGRESS NOTES
[facility] Group Note     08/01/23 1530   Activity/Group Checklist   Group Life Skills  (Anger/Anxiety/Depression Board Game)   Attendance Attended   Attendance Duration (min) 31-45  (arrived late to group)   Interactions Interacted appropriately   Affect/Mood Appropriate  (Focused, motivated, and brighter than previous encounters)   Goals Achieved Identified feelings; Identified triggers; Discussed coping strategies; Able to listen to others; Able to engage in interactions; Able to reflect/comment on own behavior;Able to recieve feedback; Able to give feedback to another

## 2023-08-01 NOTE — NURSING NOTE
Patient was given PRN Trazodone 50 mg. Upon follow-up for re-assessment, patient appears to be resting with his eyes closed.

## 2023-08-01 NOTE — PROGRESS NOTES
Progress Note - [facility] 33 y.o. male MRN: [id number]  Unit/Bed#: [facility] 216-01 Encounter: [id number]    Assessment/Plan   Principal Problem:    Schizophrenia [facility])  Active Problems:    Medical clearance for psychiatric admission    Tobacco abuse    T wave inversion in EKG      Subjective: Patient was seen, chart was reviewed, and case was discussed with the team. Patient appears withdrawn, disheveled, paranoid, scant and wearing ear plugs. Patient continues to endorse paranoia and threatening voices to hurt him. Anxiety is fluctuating. Sleep has improved with Trazodone. Appetite is poor. He is compliant with medications. Denies any side effects.    Behavior over the last 24 hours:  unchanged  Sleep: insomnia  Appetite: poor  Medication side effects: No    Medical ROS: Pertinent items are noted in HPI.all other systems are negative    Current Medications:  Current Facility-Administered Medications   Medication Dose Route Frequency   • acetaminophen (TYLENOL) tablet 650 mg  650 mg Oral Q6H PRN   • acetaminophen (TYLENOL) tablet 650 mg  650 mg Oral Q4H PRN   • acetaminophen (TYLENOL) tablet 975 mg  975 mg Oral Q6H PRN   • aluminum-magnesium hydroxide-simethicone (MAALOX) oral suspension 30 mL  30 mL Oral Q4H PRN   • haloperidol lactate (HALDOL) injection 2.5 mg  2.5 mg Intramuscular Q4H PRN Max 4/day    And   • LORazepam (ATIVAN) injection 1 mg  1 mg Intramuscular Q4H PRN Max 4/day    And   • benztropine (COGENTIN) injection 0.5 mg  0.5 mg Intramuscular Q4H PRN Max 4/day   • haloperidol lactate (HALDOL) injection 5 mg  5 mg Intramuscular Q4H PRN Max 4/day    And   • LORazepam (ATIVAN) injection 2 mg  2 mg Intramuscular Q4H PRN Max 4/day    And   • benztropine (COGENTIN) injection 1 mg  1 mg Intramuscular Q4H PRN Max 4/day   • benztropine (COGENTIN) tablet 0.5 mg  0.5 mg Oral BID   • benztropine (COGENTIN) tablet 1 mg  1 mg Oral Q4H PRN Max 6/day   • bisacodyl (DULCOLAX) rectal suppository 10 mg 10 mg Rectal Daily PRN   • hydrOXYzine HCL (ATARAX) tablet 50 mg  50 mg Oral Q6H PRN Max 4/day    Or   • diphenhydrAMINE (BENADRYL) injection 50 mg  50 mg Intramuscular Q6H PRN   • escitalopram (LEXAPRO) tablet 10 mg  10 mg Oral Daily   • haloperidol (HALDOL) tablet 1 mg  1 mg Oral Q6H PRN   • haloperidol (HALDOL) tablet 10 mg  10 mg Oral BID   • haloperidol (HALDOL) tablet 2.5 mg  2.5 mg Oral Q4H PRN Max 4/day   • haloperidol (HALDOL) tablet 5 mg  5 mg Oral Q4H PRN Max 4/day   • hydrOXYzine HCL (ATARAX) tablet 100 mg  100 mg Oral Q6H PRN Max 4/day    Or   • LORazepam (ATIVAN) injection 2 mg  2 mg Intramuscular Q6H PRN   • hydrOXYzine HCL (ATARAX) tablet 25 mg  25 mg Oral Q6H PRN Max 4/day   • mirtazapine (REMERON) tablet 30 mg  30 mg Oral HS   • nicotine polacrilex (NICORETTE) gum 4 mg  4 mg Oral Q2H PRN   • OLANZapine (ZyPREXA) tablet 20 mg  20 mg Oral HS   • polyethylene glycol (MIRALAX) packet 17 g  17 g Oral Daily PRN   • senna-docusate sodium (SENOKOT S) 8.6-50 mg per tablet 1 tablet  1 tablet Oral Daily PRN   • traZODone (DESYREL) tablet 50 mg  50 mg Oral HS PRN       Behavioral Health Medications:   all current active meds have been reviewed. Vitals:  Vitals:    08/01/23 0807   BP: 113/62   Pulse: 78   Resp: 17   Temp: 97.8 °F (36.6 °C)   SpO2: 98%       Laboratory results:    I have personally reviewed all pertinent laboratory/tests results. Mental Status Evaluation:    Appearance:  disheveled and older than stated age   Behavior:  guarded   Speech:  soft and scant   Mood:  anxious   Affect:  constricted, flat and mood-congruent   Thought Process:  goal directed   Thought Content:  delusions  persecutory   Perceptual Disturbances:  Auditory hallucinations without commands   Risk Potential: Suicidal Ideations none  Homicidal Ideations none  Potential for Aggression No   Sensorium:  person, place and time/date   Memory:  recent and remote memory grossly intact   Consciousness:  alert and awake Attention: attention span appeared shorter than expected for age   Insight:  limited   Judgment: limited   Gait/Station: normal gait/station   Motor Activity: no abnormal movements       Progress Toward Goals: Progressing    Recommended Treatment: Continue with pharmacotherapy, group therapy, milieu therapy and occupational therapy. 1.Increase Zyprexa to 20 mg PO HS  Risks, benefits and possible side effects of Medications:   Risks, benefits, alternatives, and possible side effects of patient's psychiatric medications were discussed with patient.

## 2023-08-01 NOTE — NURSING NOTE
Pt denies current SI/HI/VH with continuing AH which are manageable. Pt observed napping majority of the shifting coming out for meals only. Pt encouraged to participate in groups, pt politely declined.

## 2023-08-02 PROCEDURE — 99232 SBSQ HOSP IP/OBS MODERATE 35: CPT | Performed by: STUDENT IN AN ORGANIZED HEALTH CARE EDUCATION/TRAINING PROGRAM

## 2023-08-02 RX ADMIN — HALOPERIDOL 10 MG: 10 TABLET ORAL at 10:57

## 2023-08-02 RX ADMIN — OLANZAPINE 20 MG: 10 TABLET, FILM COATED ORAL at 21:11

## 2023-08-02 RX ADMIN — MIRTAZAPINE 30 MG: 15 TABLET, FILM COATED ORAL at 21:11

## 2023-08-02 RX ADMIN — HALOPERIDOL 10 MG: 10 TABLET ORAL at 17:11

## 2023-08-02 RX ADMIN — ESCITALOPRAM OXALATE 10 MG: 10 TABLET ORAL at 10:57

## 2023-08-02 RX ADMIN — BENZTROPINE MESYLATE 0.5 MG: 0.5 TABLET ORAL at 10:57

## 2023-08-02 RX ADMIN — TRAZODONE HYDROCHLORIDE 50 MG: 50 TABLET ORAL at 21:11

## 2023-08-02 RX ADMIN — NICOTINE POLACRILEX 4 MG: 4 GUM, CHEWING BUCCAL at 20:00

## 2023-08-02 RX ADMIN — BENZTROPINE MESYLATE 0.5 MG: 0.5 TABLET ORAL at 17:11

## 2023-08-02 NOTE — PROGRESS NOTES
[facility] Group Note     08/02/23 1500   Activity/Group Checklist   Group Personal control  (Music and Lyrics)   Attendance Attended   Attendance Duration (min) 16-30  (arrived toward the end of group)   Interactions Interacted appropriately   Affect/Mood Appropriate;Calm  (Focused)   Goals Achieved Able to listen to others; Able to engage in interactions; Able to reflect/comment on own behavior;Able to recieve feedback; Able to give feedback to another

## 2023-08-02 NOTE — PROGRESS NOTES
Attended group alongside other people on the unit, and participated in discussion around recovery-centered topic.     07/31/23 1530   Activity/Group Checklist   Group Other (Comment)  (3:30 PM Goal Accomplishments Group)   Attendance Attended  (Left before end of this group, did not return to this group.)   Attendance Duration (min) 31-45   Interactions Interacted appropriately   Affect/Mood Appropriate   Goals Achieved Other (Comment)  (Engaged with CPS and peers on the unit by contributing to and passing around the room the unit’s ‘We Don’t Have to [facility] collection of honest expressions.  Discovered video of strangers’ confessions around health, family, and housing.)

## 2023-08-02 NOTE — NURSING NOTE
Pt remains isolative to room. Denies SI/HI/VH reports AH which are manageable at baseline. Pt is scant but pleasant during conversation. Pt offered no questions or concerns at this time.

## 2023-08-02 NOTE — PROGRESS NOTES
Progress Note - [facility] 33 y.o. male MRN: [id number]  Unit/Bed#: [facility] 216-01 Encounter: [id number]    Assessment/Plan   Principal Problem:    Schizophrenia [facility])  Active Problems:    Medical clearance for psychiatric admission    Tobacco abuse    T wave inversion in EKG      Subjective: Patient was seen, chart was reviewed, and case was discussed with the team. Patient appears withdrawn, guarded, paranoid and disheveled. He continues to endorse paranoia and voices threatening to hurt him. He doesn't feel safe outside the hospital. Sleep and appetite are improving. He is compliant with medications. Denies any side effects.    Behavior over the last 24 hours:  unchanged  Sleep: normal  Appetite: normal  Medication side effects: No    Medical ROS: Pertinent items are noted in HPI.all other systems are negative    Current Medications:  Current Facility-Administered Medications   Medication Dose Route Frequency   • acetaminophen (TYLENOL) tablet 650 mg  650 mg Oral Q6H PRN   • acetaminophen (TYLENOL) tablet 650 mg  650 mg Oral Q4H PRN   • acetaminophen (TYLENOL) tablet 975 mg  975 mg Oral Q6H PRN   • aluminum-magnesium hydroxide-simethicone (MAALOX) oral suspension 30 mL  30 mL Oral Q4H PRN   • haloperidol lactate (HALDOL) injection 2.5 mg  2.5 mg Intramuscular Q4H PRN Max 4/day    And   • LORazepam (ATIVAN) injection 1 mg  1 mg Intramuscular Q4H PRN Max 4/day    And   • benztropine (COGENTIN) injection 0.5 mg  0.5 mg Intramuscular Q4H PRN Max 4/day   • haloperidol lactate (HALDOL) injection 5 mg  5 mg Intramuscular Q4H PRN Max 4/day    And   • LORazepam (ATIVAN) injection 2 mg  2 mg Intramuscular Q4H PRN Max 4/day    And   • benztropine (COGENTIN) injection 1 mg  1 mg Intramuscular Q4H PRN Max 4/day   • benztropine (COGENTIN) tablet 0.5 mg  0.5 mg Oral BID   • benztropine (COGENTIN) tablet 1 mg  1 mg Oral Q4H PRN Max 6/day   • bisacodyl (DULCOLAX) rectal suppository 10 mg  10 mg Rectal Daily PRN   • hydrOXYzine HCL (ATARAX) tablet 50 mg  50 mg Oral Q6H PRN Max 4/day    Or   • diphenhydrAMINE (BENADRYL) injection 50 mg  50 mg Intramuscular Q6H PRN   • escitalopram (LEXAPRO) tablet 10 mg  10 mg Oral Daily   • haloperidol (HALDOL) tablet 1 mg  1 mg Oral Q6H PRN   • haloperidol (HALDOL) tablet 10 mg  10 mg Oral BID   • haloperidol (HALDOL) tablet 2.5 mg  2.5 mg Oral Q4H PRN Max 4/day   • haloperidol (HALDOL) tablet 5 mg  5 mg Oral Q4H PRN Max 4/day   • hydrOXYzine HCL (ATARAX) tablet 100 mg  100 mg Oral Q6H PRN Max 4/day    Or   • LORazepam (ATIVAN) injection 2 mg  2 mg Intramuscular Q6H PRN   • hydrOXYzine HCL (ATARAX) tablet 25 mg  25 mg Oral Q6H PRN Max 4/day   • mirtazapine (REMERON) tablet 30 mg  30 mg Oral HS   • nicotine polacrilex (NICORETTE) gum 4 mg  4 mg Oral Q2H PRN   • OLANZapine (ZyPREXA) tablet 20 mg  20 mg Oral HS   • polyethylene glycol (MIRALAX) packet 17 g  17 g Oral Daily PRN   • senna-docusate sodium (SENOKOT S) 8.6-50 mg per tablet 1 tablet  1 tablet Oral Daily PRN   • traZODone (DESYREL) tablet 50 mg  50 mg Oral HS PRN       Behavioral Health Medications:   all current active meds have been reviewed. Vitals:  Vitals:    08/02/23 0726   BP: 144/82   Pulse: 85   Resp: 18   Temp: 97.9 °F (36.6 °C)   SpO2:        Laboratory results:    I have personally reviewed all pertinent laboratory/tests results. Mental Status Evaluation:    Appearance:  age appropriate   Behavior:  guarded   Speech:  soft   Mood:  depressed   Affect:  constricted and flat   Thought Process:  goal directed and logical   Thought Content:  paranoia   Perceptual Disturbances:  Auditory hallucinations without commands   Risk Potential: Suicidal Ideations none  Homicidal Ideations none  Potential for Aggression No   Sensorium:  person, place and time/date   Memory:  recent and remote memory grossly intact   Consciousness:  alert and awake    Attention: attention span appeared shorter than expected for age   Insight: limited   Judgment: limited   Gait/Station: normal gait/station   Motor Activity: no abnormal movements       Progress Toward Goals: Progressing    Recommended Treatment: Continue with pharmacotherapy, group therapy, milieu therapy and occupational therapy. Risks, benefits and possible side effects of Medications:   Risks, benefits, alternatives, and possible side effects of patient's psychiatric medications were discussed with patient.

## 2023-08-02 NOTE — PLAN OF CARE
Problem: Alteration in Thoughts and Perception  Goal: Verbalize thoughts and feelings  Description: Interventions:  - Promote a nonjudgmental and trusting relationship with the patient through active listening and therapeutic communication  - Assess patient's level of functioning, behavior and potential for risk  - Engage patient in 1 on 1 interactions  - Encourage patient to express fears, feelings, frustrations, and discuss symptoms    - [place] patient to reality, help patient recognize reality-based thinking   - Administer medications as ordered and assess for potential side effects  - Provide the patient education related to the signs and symptoms of the illness and desired effects of prescribed medications  Outcome: Progressing     Problem: Alteration in Thoughts and Perception  Goal: Recognize dysfunctional thoughts, communicate reality-based thoughts at the time of discharge  Description: Interventions:  - Provide medication and psycho-education to assist patient in compliance and developing insight into his/her illness   Outcome: Progressing

## 2023-08-02 NOTE — PROGRESS NOTES
Pt denies all symptoms but reported AH which he says Is manageable. Withdrawn to room, has not been attending groups. DC: TBMIKA      08/02/23 7793   Team Meeting   Meeting Type Daily Rounds   Team Members Present   Team Members Present Physician;;Nurse; Other (Discipline and Name)   Physician Team Member Dr. Viridiana Viveros / Dr. Ginette Zepeda / [facility] / [address] [place] [address] Drive Team Member [facility] Management Team Member Kiki Norton / Victor Manuel Arreola / [place]   Other (Discipline and Name) Arcadio Gleason (Art Therapy) - Pharmacist(s)   Patient/Family Present   Patient Present No   Patient's Family Present No

## 2023-08-02 NOTE — NURSING NOTE
Pleasant , still hearing voice , appears to be comfortable with them refuses medication to help alleviate them

## 2023-08-03 PROCEDURE — 99232 SBSQ HOSP IP/OBS MODERATE 35: CPT | Performed by: STUDENT IN AN ORGANIZED HEALTH CARE EDUCATION/TRAINING PROGRAM

## 2023-08-03 RX ADMIN — MIRTAZAPINE 30 MG: 15 TABLET, FILM COATED ORAL at 21:14

## 2023-08-03 RX ADMIN — BENZTROPINE MESYLATE 0.5 MG: 0.5 TABLET ORAL at 17:17

## 2023-08-03 RX ADMIN — HALOPERIDOL 10 MG: 10 TABLET ORAL at 17:17

## 2023-08-03 RX ADMIN — OLANZAPINE 20 MG: 10 TABLET, FILM COATED ORAL at 21:14

## 2023-08-03 RX ADMIN — TRAZODONE HYDROCHLORIDE 50 MG: 50 TABLET ORAL at 21:14

## 2023-08-03 RX ADMIN — HALOPERIDOL 10 MG: 10 TABLET ORAL at 08:46

## 2023-08-03 RX ADMIN — BENZTROPINE MESYLATE 0.5 MG: 0.5 TABLET ORAL at 08:47

## 2023-08-03 RX ADMIN — ESCITALOPRAM OXALATE 10 MG: 10 TABLET ORAL at 08:47

## 2023-08-03 NOTE — NURSING NOTE
Pt OOB for meals and medication. Denies SI/HI. Reports sleeping well overnight. No changes in AH. Denies any question or concern at this time.

## 2023-08-03 NOTE — NURSING NOTE
Patient withdrawn to room. Did shower this evening and now resting in bed, awake. Denies SI, HI, visual hallucinations. Denies anxiety, reports moderate depression. Continues to report auditory hallucinations, non-command type, voices saying "we will kill you" to the patient. Reports they are decreasing every day and he is happy with his medications. No questions or concerns at this time, staff availability reinforced.

## 2023-08-03 NOTE — PROGRESS NOTES
Progress Note - [facility] 33 y.o. male MRN: [id number]  Unit/Bed#: [facility] 216-01 Encounter: [id number]    Assessment/Plan   Principal Problem:    Schizophrenia ([address])  Active Problems:    Medical clearance for psychiatric admission    Tobacco abuse    T wave inversion in EKG      Subjective: Patient was seen, chart was reviewed, and case was discussed with the team. Patient appears lying in bed with ear plugs, disheveled, malodorous and paranoid. Reports that voices are loud and threatening to hurt him. He is scared to go out of his room and doesn't feel safe. He is compliant with medications. Denies any side effects.    Behavior over the last 24 hours:  unchanged  Sleep: normal  Appetite: normal  Medication side effects: No    Medical ROS: Pertinent items are noted in HPI.all other systems are negative    Current Medications:  Current Facility-Administered Medications   Medication Dose Route Frequency   • acetaminophen (TYLENOL) tablet 650 mg  650 mg Oral Q6H PRN   • acetaminophen (TYLENOL) tablet 650 mg  650 mg Oral Q4H PRN   • acetaminophen (TYLENOL) tablet 975 mg  975 mg Oral Q6H PRN   • aluminum-magnesium hydroxide-simethicone (MAALOX) oral suspension 30 mL  30 mL Oral Q4H PRN   • haloperidol lactate (HALDOL) injection 2.5 mg  2.5 mg Intramuscular Q4H PRN Max 4/day    And   • LORazepam (ATIVAN) injection 1 mg  1 mg Intramuscular Q4H PRN Max 4/day    And   • benztropine (COGENTIN) injection 0.5 mg  0.5 mg Intramuscular Q4H PRN Max 4/day   • haloperidol lactate (HALDOL) injection 5 mg  5 mg Intramuscular Q4H PRN Max 4/day    And   • LORazepam (ATIVAN) injection 2 mg  2 mg Intramuscular Q4H PRN Max 4/day    And   • benztropine (COGENTIN) injection 1 mg  1 mg Intramuscular Q4H PRN Max 4/day   • benztropine (COGENTIN) tablet 0.5 mg  0.5 mg Oral BID   • benztropine (COGENTIN) tablet 1 mg  1 mg Oral Q4H PRN Max 6/day   • bisacodyl (DULCOLAX) rectal suppository 10 mg  10 mg Rectal Daily PRN   • hydrOXYzine HCL (ATARAX) tablet 50 mg  50 mg Oral Q6H PRN Max 4/day    Or   • diphenhydrAMINE (BENADRYL) injection 50 mg  50 mg Intramuscular Q6H PRN   • escitalopram (LEXAPRO) tablet 10 mg  10 mg Oral Daily   • haloperidol (HALDOL) tablet 1 mg  1 mg Oral Q6H PRN   • haloperidol (HALDOL) tablet 10 mg  10 mg Oral BID   • haloperidol (HALDOL) tablet 2.5 mg  2.5 mg Oral Q4H PRN Max 4/day   • haloperidol (HALDOL) tablet 5 mg  5 mg Oral Q4H PRN Max 4/day   • hydrOXYzine HCL (ATARAX) tablet 100 mg  100 mg Oral Q6H PRN Max 4/day    Or   • LORazepam (ATIVAN) injection 2 mg  2 mg Intramuscular Q6H PRN   • hydrOXYzine HCL (ATARAX) tablet 25 mg  25 mg Oral Q6H PRN Max 4/day   • mirtazapine (REMERON) tablet 30 mg  30 mg Oral HS   • nicotine polacrilex (NICORETTE) gum 4 mg  4 mg Oral Q2H PRN   • OLANZapine (ZyPREXA) tablet 20 mg  20 mg Oral HS   • polyethylene glycol (MIRALAX) packet 17 g  17 g Oral Daily PRN   • senna-docusate sodium (SENOKOT S) 8.6-50 mg per tablet 1 tablet  1 tablet Oral Daily PRN   • traZODone (DESYREL) tablet 50 mg  50 mg Oral HS PRN       Behavioral Health Medications:   all current active meds have been reviewed. Vitals:  Vitals:    08/03/23 0749   BP: 105/67   Pulse: 75   Resp: 18   Temp: 97.9 °F (36.6 °C)   SpO2: 99%       Laboratory results:    I have personally reviewed all pertinent laboratory/tests results. Mental Status Evaluation:    Appearance:  disheveled, older than stated age and overweight   Behavior:  guarded   Speech:  soft   Mood:  anxious and depressed   Affect:  constricted and flat   Thought Process:  goal directed   Thought Content:  delusions  persecutory   Perceptual Disturbances:  Auditory hallucinations without commands   Risk Potential: Suicidal Ideations none  Homicidal Ideations none  Potential for Aggression No   Sensorium:  person, place and time/date   Memory:  recent and remote memory grossly intact   Consciousness:  alert and awake    Attention: attention span appeared shorter than expected for age   Insight:  limited   Judgment: limited   Gait/Station: normal gait/station   Motor Activity: no abnormal movements       Progress Toward Goals:  Patient was hospitalized multiple times in the past year. He has showed no improvement on high doses of Haldol and Zyprexa together and remained psychotic. Clozapine trial or ECT are potential options in coming days for refractory psychosis. Recommended Treatment: Continue with pharmacotherapy, group therapy, milieu therapy and occupational therapy. Risks, benefits and possible side effects of Medications:   Risks, benefits, alternatives, and possible side effects of patient's psychiatric medications were discussed with patient.

## 2023-08-03 NOTE — NURSING NOTE
F/U to Trazodone administration for sleep at 2111 hrs, Pt laying in bed, eyes close, even bilateral chest rise and fall observed. No distress observed.

## 2023-08-03 NOTE — PROGRESS NOTES
[facility] Group Note     08/03/23 1530   Activity/Group Checklist   Group Personal control  (Mindfulness Technique - 5 Senses Grounding)   Attendance Attended   Attendance Duration (min) 46-60   Interactions Interacted appropriately   Affect/Mood Appropriate  (Focused and motivated; Brighter than previous encounters)   Goals Achieved Discussed coping strategies; Able to listen to others; Able to engage in interactions; Able to reflect/comment on own behavior;Able to recieve feedback; Able to give feedback to another

## 2023-08-03 NOTE — PROGRESS NOTES
Pt denies SI, reporting voices and AH, seclusive to room and self and not coming out of room as much as he was. Reported paranoia, afraid to leave his room. DC: TBD       08/03/23 0757   Team Meeting   Meeting Type Daily Rounds   Team Members Present   Team Members Present Physician;Nurse;; Other (Discipline and Name)   Physician Team Member Dr. Migdalia Arechiga / Dr. Corina Mendoza / Samuel Morales Team Member Gallito Morillo / [facility] Management Team Member Alicia Edwards / Tessa Alvarez / Maryam Webb   Other (Discipline and Name) Bryan Rivers - Art Therapy   Patient/Family Present   Patient Present No   Patient's Family Present No

## 2023-08-03 NOTE — CASE MANAGEMENT
Pt continues to be seclusive to his room, displaying paranoia and that he is afraid of leaving his room and reporting voices that are threatening to hurt him. Pt had been more visible and social with peers but last few days has been more seclusive to self and room. AH that is manageable at times but is still present. Pt still plans to return home with aunt when feeling improved. Pt family is supportive and have been assisting pt in cancelling [place] Medicaid and helped pt apply for [place] medicaid for him to be able to received [id number] [facility] OP services in [place]. Pt discharged from [facility] on 7/7/23 without OP services due to his insurance not being active in [place] yet and pt was re-admitted. Pt unable to receive Critical access hospital funded services due to [employer] being active still.

## 2023-08-04 PROCEDURE — 99232 SBSQ HOSP IP/OBS MODERATE 35: CPT | Performed by: STUDENT IN AN ORGANIZED HEALTH CARE EDUCATION/TRAINING PROGRAM

## 2023-08-04 RX ORDER — LITHIUM CARBONATE 450 MG
450 TABLET, EXTENDED RELEASE ORAL
Status: DISCONTINUED | OUTPATIENT
Start: 2023-08-04 | End: 2023-08-10

## 2023-08-04 RX ADMIN — ESCITALOPRAM OXALATE 10 MG: 10 TABLET ORAL at 09:12

## 2023-08-04 RX ADMIN — LITHIUM CARBONATE 450 MG: 450 TABLET, EXTENDED RELEASE ORAL at 21:24

## 2023-08-04 RX ADMIN — MIRTAZAPINE 30 MG: 15 TABLET, FILM COATED ORAL at 21:24

## 2023-08-04 RX ADMIN — BENZTROPINE MESYLATE 0.5 MG: 0.5 TABLET ORAL at 17:01

## 2023-08-04 RX ADMIN — HALOPERIDOL 10 MG: 10 TABLET ORAL at 09:13

## 2023-08-04 RX ADMIN — BENZTROPINE MESYLATE 0.5 MG: 0.5 TABLET ORAL at 09:13

## 2023-08-04 RX ADMIN — TRAZODONE HYDROCHLORIDE 50 MG: 50 TABLET ORAL at 21:24

## 2023-08-04 RX ADMIN — HALOPERIDOL 10 MG: 10 TABLET ORAL at 17:01

## 2023-08-04 RX ADMIN — OLANZAPINE 20 MG: 10 TABLET, FILM COATED ORAL at 21:24

## 2023-08-04 NOTE — TREATMENT TEAM
08/04/23 1330   Activity/Group Checklist   Group Other (Comment)  (art therapy)   Attendance Attended   Attendance Duration (min) 46-60   Interactions Interacted appropriately   Affect/Mood Appropriate   Goals Achieved Able to listen to others; Able to engage in interactions; Other (Comment)  (authentic, spontaneous self expression, connection, and insight)

## 2023-08-04 NOTE — NURSING NOTE
F/U to Trazodone administration at 2114 hrs as sleep aid. Patient in bed, appears to be asleep, even bilateral chest expansion observed, no distress noted.

## 2023-08-04 NOTE — PLAN OF CARE
Problem: Alteration in Thoughts and Perception  Goal: Treatment Goal: Gain control of psychotic behaviors/thinking, reduce/eliminate presenting symptoms and demonstrate improved reality functioning upon discharge  Outcome: Progressing  Goal: Verbalize thoughts and feelings  Description: Interventions:  - Promote a nonjudgmental and trusting relationship with the patient through active listening and therapeutic communication  - Assess patient's level of functioning, behavior and potential for risk  - Engage patient in 1 on 1 interactions  - Encourage patient to express fears, feelings, frustrations, and discuss symptoms    - [place] patient to reality, help patient recognize reality-based thinking   - Administer medications as ordered and assess for potential side effects  - Provide the patient education related to the signs and symptoms of the illness and desired effects of prescribed medications  Outcome: Progressing  Goal: Refrain from acting on delusional thinking/internal stimuli  Description: Interventions:  - Monitor patient closely, per order   - Utilize least restrictive measures   - Set reasonable limits, give positive feedback for acceptable   - Administer medications as ordered and monitor of potential side effects  Outcome: Progressing  Goal: Agree to be compliant with medication regime, as prescribed and report medication side effects  Description: Interventions:  - Offer appropriate PRN medication and supervise ingestion; conduct AIMS, as needed   Outcome: Progressing  Goal: Attend and participate in unit activities, including therapeutic, recreational, and educational groups  Description: Interventions:  -Encourage Visitation and family involvement in care  Outcome: Progressing  Goal: Recognize dysfunctional thoughts, communicate reality-based thoughts at the time of discharge  Description: Interventions:  - Provide medication and psycho-education to assist patient in compliance and developing insight into his/her illness   Outcome: Progressing  Goal: Complete daily ADLs, including personal hygiene independently, as able  Description: Interventions:  - Observe, teach, and assist patient with ADLS  - Monitor and promote a balance of rest/activity, with adequate nutrition and elimination   Outcome: Progressing     Problem: Ineffective Coping  Goal: Identifies ineffective coping skills  Outcome: Progressing  Goal: Identifies healthy coping skills  Outcome: Progressing  Goal: Demonstrates healthy coping skills  Outcome: Progressing  Goal: Participates in unit activities  Description: Interventions:  - Provide therapeutic environment   - Provide required programming   - Redirect inappropriate behaviors   Outcome: Progressing  Goal: Patient/Family participate in treatment and DC plans  Description: Interventions:  - Provide therapeutic environment  Outcome: Progressing  Goal: Patient/Family verbalizes awareness of resources  Outcome: Progressing  Goal: Understands least restrictive measures  Description: Interventions:  - Utilize least restrictive behavior  Outcome: Progressing  Goal: Free from restraint events  Description: - Utilize least restrictive measures   - Provide behavioral interventions   - Redirect inappropriate behaviors   Outcome: Progressing     Problem: Alteration in Orientation  Goal: Treatment Goal: Demonstrate a reduction of confusion and improved orientation to person, place, time and/or situation upon discharge, according to optimum baseline/ability  Outcome: Progressing  Goal: Interact with staff daily  Description: Interventions:  - Assess and re-assess patient's level of orientation  - Engage patient in 1 on 1 interactions, daily, for a minimum of 15 minutes   - Establish rapport/trust with patient   Outcome: Progressing  Goal: Express concerns related to confused thinking related to:  Description: Interventions:  - Encourage patient to express feelings, fears, frustrations, hopes  - Assign consistent caregivers   - [place]/re-orient patient as needed  - Allow comfort items, as appropriate  - Provide visual cues, signs, etc.   Outcome: Progressing  Goal: Allow medical examinations, as recommended  Description: Interventions:  - Provide physical/neurological exams and/or referrals, per provider   Outcome: Progressing  Goal: Cooperate with recommended testing/procedures  Description: Interventions:  - Determine need for ancillary testing  - Observe for mental status changes  - Implement falls/precaution protocol   Outcome: Progressing  Goal: Attend and participate in unit activities, including therapeutic, recreational, and educational groups  Description: Interventions:  - Provide therapeutic and educational activities daily, encourage attendance and participation, and document same in the medical record   - Provide appropriate opportunities for reminiscence   - Provide a consistent daily routine   - Encourage family contact/visitation   Outcome: Progressing  Goal: Complete daily ADLs, including personal hygiene independently, as able  Description: Interventions:  - Observe, teach, and assist patient with ADLS  Outcome: Progressing

## 2023-08-04 NOTE — NURSING NOTE
Pt reports feeling slightly better than on admission but continues to hear voices all day. He reports that he hears many voices that are sometimes loud, sometimes he can ignore them, and they tell him that he is going to hell. The voices are typically worse at night. He denies command AH and denies SI/HI. He reports eating and sleeping well. Pt denies side effects and denies questions or concerns about medications, he reports he is starting Lithium and is hopeful that will help him. Blunted affect, mostly seclusive to his room, cooperative.

## 2023-08-04 NOTE — PROGRESS NOTES
Pt denies SI, withdrawn to room but came out In the evening. Reported AH and that they were going to kill him. DC: TBD      08/04/23 0754   Team Meeting   Meeting Type Daily Rounds   Team Members Present   Team Members Present Physician;Nurse;; Other (Discipline and Name)   Physician Team Member Dr. Dora Louise / Dr. Liz Cho / Nadira Mustafa Team Member [facility] Management Team Member Stephanie Jerome / Luis Martinez / Zack Lozano   Other (Discipline and Name) Meridee Rising - Art Therapy   Patient/Family Present   Patient Present No   Patient's Family Present No

## 2023-08-04 NOTE — NURSING NOTE
Pt denies SI/HI/VH, continues with AH which are manageable. Pt is visible in afternoon and social with peers. Pt offers no questions/concerns at this time.

## 2023-08-04 NOTE — PROGRESS NOTES
Progress Note - [facility], [place] 33 y.o. male MRN: [id number]  Unit/Bed#: [facility] 216-01 Encounter: [id number]    Assessment/Plan   Principal Problem:    Schizophrenia ([address])  Active Problems:    Medical clearance for psychiatric admission    Tobacco abuse    T wave inversion in EKG      Subjective: Patient was seen, chart was reviewed, and case was discussed with the team. Patient appears withdrawn, isolative, disheveled, malodorous, paranoid and depressed. Patient endorses racing thoughts and intrusive voices threatening to hurt him, worse during night time. He endorses paranoia and doesn't feel safe outside the hospital. He is not interested in starting Clozapine or ECT at this time. He is willing to try Lithium.   Behavior over the last 24 hours:  unchanged  Sleep: insomnia  Appetite: normal  Medication side effects: No    Medical ROS: Pertinent items are noted in HPI.all other systems are negative    Current Medications:  Current Facility-Administered Medications   Medication Dose Route Frequency   • acetaminophen (TYLENOL) tablet 650 mg  650 mg Oral Q6H PRN   • acetaminophen (TYLENOL) tablet 650 mg  650 mg Oral Q4H PRN   • acetaminophen (TYLENOL) tablet 975 mg  975 mg Oral Q6H PRN   • aluminum-magnesium hydroxide-simethicone (MAALOX) oral suspension 30 mL  30 mL Oral Q4H PRN   • haloperidol lactate (HALDOL) injection 2.5 mg  2.5 mg Intramuscular Q4H PRN Max 4/day    And   • LORazepam (ATIVAN) injection 1 mg  1 mg Intramuscular Q4H PRN Max 4/day    And   • benztropine (COGENTIN) injection 0.5 mg  0.5 mg Intramuscular Q4H PRN Max 4/day   • haloperidol lactate (HALDOL) injection 5 mg  5 mg Intramuscular Q4H PRN Max 4/day    And   • LORazepam (ATIVAN) injection 2 mg  2 mg Intramuscular Q4H PRN Max 4/day    And   • benztropine (COGENTIN) injection 1 mg  1 mg Intramuscular Q4H PRN Max 4/day   • benztropine (COGENTIN) tablet 0.5 mg  0.5 mg Oral BID   • benztropine (COGENTIN) tablet 1 mg  1 mg Oral Q4H PRN Max 6/day   • bisacodyl (DULCOLAX) rectal suppository 10 mg  10 mg Rectal Daily PRN   • hydrOXYzine HCL (ATARAX) tablet 50 mg  50 mg Oral Q6H PRN Max 4/day    Or   • diphenhydrAMINE (BENADRYL) injection 50 mg  50 mg Intramuscular Q6H PRN   • escitalopram (LEXAPRO) tablet 10 mg  10 mg Oral Daily   • haloperidol (HALDOL) tablet 1 mg  1 mg Oral Q6H PRN   • haloperidol (HALDOL) tablet 10 mg  10 mg Oral BID   • haloperidol (HALDOL) tablet 2.5 mg  2.5 mg Oral Q4H PRN Max 4/day   • haloperidol (HALDOL) tablet 5 mg  5 mg Oral Q4H PRN Max 4/day   • hydrOXYzine HCL (ATARAX) tablet 100 mg  100 mg Oral Q6H PRN Max 4/day    Or   • LORazepam (ATIVAN) injection 2 mg  2 mg Intramuscular Q6H PRN   • hydrOXYzine HCL (ATARAX) tablet 25 mg  25 mg Oral Q6H PRN Max 4/day   • lithium carbonate (LITHOBID) CR tablet 450 mg  450 mg Oral HS   • mirtazapine (REMERON) tablet 30 mg  30 mg Oral HS   • nicotine polacrilex (NICORETTE) gum 4 mg  4 mg Oral Q2H PRN   • OLANZapine (ZyPREXA) tablet 20 mg  20 mg Oral HS   • polyethylene glycol (MIRALAX) packet 17 g  17 g Oral Daily PRN   • senna-docusate sodium (SENOKOT S) 8.6-50 mg per tablet 1 tablet  1 tablet Oral Daily PRN   • traZODone (DESYREL) tablet 50 mg  50 mg Oral HS PRN       Behavioral Health Medications:   all current active meds have been reviewed. Vitals:  Vitals:    08/04/23 0715   BP: 112/77   Pulse: 81   Resp: 16   Temp: 97.5 °F (36.4 °C)   SpO2: 98%       Laboratory results:    I have personally reviewed all pertinent laboratory/tests results. Mental Status Evaluation:    Appearance:  disheveled and older than stated age   Behavior:  guarded   Speech:  soft   Mood:  anxious and depressed   Affect:  constricted and flat   Thought Process:  goal directed   Thought Content:  delusions  persecutory   Perceptual Disturbances:  Auditory hallucinations without commands   Risk Potential: Suicidal Ideations none  Homicidal Ideations none  Potential for Aggression No   Sensorium: person, place and time/date   Memory:  recent and remote memory grossly intact   Consciousness:  alert and awake    Attention: attention span appeared shorter than expected for age   Insight:  limited   Judgment: limited   Gait/Station: normal gait/station   Motor Activity: no abnormal movements       Progress Toward Goals: Progressing    Recommended Treatment: Continue with pharmacotherapy, group therapy, milieu therapy and occupational therapy. 1.Start Lithium 450 mg PO HS  Risks, benefits and possible side effects of Medications:   Risks, benefits, alternatives, and possible side effects of patient's psychiatric medications were discussed with patient.

## 2023-08-04 NOTE — CASE MANAGEMENT
CM met with Pt to assist with pay for insurance bill over the phone. Pt paid insurance bill over the phone. CM asked how the Pt was feeling.  Pt stated that "they are feeling ok I guess."

## 2023-08-05 PROCEDURE — 99232 SBSQ HOSP IP/OBS MODERATE 35: CPT | Performed by: PSYCHIATRY & NEUROLOGY

## 2023-08-05 RX ADMIN — BENZTROPINE MESYLATE 0.5 MG: 0.5 TABLET ORAL at 09:36

## 2023-08-05 RX ADMIN — HALOPERIDOL 10 MG: 10 TABLET ORAL at 09:36

## 2023-08-05 RX ADMIN — ESCITALOPRAM OXALATE 10 MG: 10 TABLET ORAL at 09:36

## 2023-08-05 RX ADMIN — HALOPERIDOL 10 MG: 10 TABLET ORAL at 17:00

## 2023-08-05 RX ADMIN — OLANZAPINE 20 MG: 10 TABLET, FILM COATED ORAL at 21:16

## 2023-08-05 RX ADMIN — BENZTROPINE MESYLATE 0.5 MG: 0.5 TABLET ORAL at 17:00

## 2023-08-05 RX ADMIN — MIRTAZAPINE 30 MG: 15 TABLET, FILM COATED ORAL at 21:17

## 2023-08-05 RX ADMIN — TRAZODONE HYDROCHLORIDE 50 MG: 50 TABLET ORAL at 21:17

## 2023-08-05 RX ADMIN — LITHIUM CARBONATE 450 MG: 450 TABLET, EXTENDED RELEASE ORAL at 21:16

## 2023-08-05 NOTE — NURSING NOTE
Pt denies SI/HI/VH. AH continue at manageable level. Pt more visible on unit today social with peers and attending afternoon group. Pt pleasant in conversation and states he is "doing alright".

## 2023-08-05 NOTE — PROGRESS NOTES
Progress Note - [facility] 33 y.o. male MRN: [id number]  Unit/Bed#: [facility] 216-01 Encounter: [id number]    Assessment/Plan   Principal Problem:    Schizophrenia [facility])  Active Problems:    Medical clearance for psychiatric admission    Tobacco abuse    T wave inversion in EKG    • Continue medications as noted below. • Yoselyn Warren 8/9  • Continue to promote patient participation in group therapy, milieu therapy, occupational therapy  • Continue medical management by primary team.  • Discharge disposition:  pending    Subjective: The patient was evaluated this morning for continuity of care and no acute distress noted throughout the evaluation. Over the past 24 hours staff noted that patient has continued to experience AH all day Denied CAH. Patient has been medication adherent. Today on evaluation, Alberto reports that he is still feeling the same as yesterday. Reports no issues starting Lithium yesterday in the evening. Reports that he is experiencing AH of many voices telling him to go to [place]. He reports the voices are worst at night. In terms of safety, Alberto Denies SI/HI.      Psychiatric Review of Systems:  Behavior over the last 24 hours:  unchanged  Sleep: normal  Appetite: normal  Medication side effects: No   ROS: no complaints, all other systems are negative    Current Medications:  Current Facility-Administered Medications   Medication Dose Route Frequency Provider Last Rate   • acetaminophen  650 mg Oral Q6H PRN Mateo Ferreira MD     • acetaminophen  650 mg Oral Q4H PRN Mateo Ferreira MD     • acetaminophen  975 mg Oral Q6H PRN Mateo Ferreira MD     • aluminum-magnesium hydroxide-simethicone  30 mL Oral Q4H PRN Mateo Ferreira MD     • haloperidol lactate  2.5 mg Intramuscular Q4H PRN Max 4/day Mateo Ferreira MD      And   • LORazepam  1 mg Intramuscular Q4H PRN Max 4/day Mateo Ferreira MD      And   • benztropine  0.5 mg Intramuscular Q4H PRN Max 4/day Yasmin Sandra Lobato MD     • haloperidol lactate  5 mg Intramuscular Q4H PRN Max 4/day Tona Crouch MD      And   • LORazepam  2 mg Intramuscular Q4H PRN Max 4/day Tona Crouch MD      And   • benztropine  1 mg Intramuscular Q4H PRN Max 4/day Tona Crouch MD     • benztropine  0.5 mg Oral BID [place], DO     • benztropine  1 mg Oral Q4H PRN Max 6/day Tona Crouch MD     • bisacodyl  10 mg Rectal Daily PRN Tona Crouch MD     • hydrOXYzine HCL  50 mg Oral Q6H PRN Max 4/day Tona Crouch MD      Or   • diphenhydrAMINE  50 mg Intramuscular Q6H PRN Tona Crouch MD     • escitalopram  10 mg Oral Daily Tona Crouch MD     • haloperidol  1 mg Oral Q6H PRN Tona Crouch MD     • haloperidol  10 mg Oral BID Tona Crouch MD     • haloperidol  2.5 mg Oral Q4H PRN Max 4/day Tona Crouch MD     • haloperidol  5 mg Oral Q4H PRN Max 4/day Tona Crouch MD     • hydrOXYzine HCL  100 mg Oral Q6H PRN Max 4/day Tona Crouch MD      Or   • LORazepam  2 mg Intramuscular Q6H PRN Tona Crouch MD     • hydrOXYzine HCL  25 mg Oral Q6H PRN Max 4/day Tona Crouch MD     • lithium carbonate  450 mg Oral HS Katy Angulo MD     • mirtazapine  30 mg Oral HS Katy Angulo MD     • nicotine polacrilex  4 mg Oral Q2H PRN Tona Crouch MD     • OLANZapine  20 mg Oral HS Katy Angulo MD     • polyethylene glycol  17 g Oral Daily PRN Tona Crouch MD     • senna-docusate sodium  1 tablet Oral Daily PRN Tona Crouch MD     • traZODone  50 mg Oral HS PRN Tona Crouch MD         Behavioral Health Medications: all current active meds have been reviewed and continue current psychiatric medications.     Vital signs in last 24 hours:  Temp:  [97.5 °F (36.4 °C)-97.9 °F (36.6 °C)] 97.5 °F (36.4 °C)  HR:  [] 92  Resp:  [16-18] 16  BP: (125-147)/(75-98) 125/75    Laboratory results:    I have personally reviewed all pertinent laboratory/tests results. Most Recent Labs:   Lab Results   Component Value Date    WBC 8.69 07/13/2023    RBC 5.05 07/13/2023    HGB 12.5 07/13/2023    HCT 40.9 07/13/2023     07/13/2023    RDW 13.2 07/13/2023    NEUTROABS 4.09 07/13/2023    SODIUM 139 07/13/2023    K 4.1 07/13/2023     07/13/2023    CO2 27 07/13/2023    BUN 11 07/13/2023    CREATININE 0.95 07/13/2023    GLUC 81 07/13/2023    GLUF 81 07/13/2023    CALCIUM 9.1 07/13/2023    AST 59 (H) 07/13/2023     (H) 07/13/2023    ALKPHOS 89 07/13/2023    TP 6.9 07/13/2023    ALB 3.9 07/13/2023    TBILI 0.44 07/13/2023    CHOLESTEROL 205 (H) 07/13/2023    HDL 48 07/13/2023    TRIG 86 07/13/2023    LDLCALC 140 (H) 07/13/2023    NONHDLC 157 07/13/2023    SDD6SBXTFAIC 1.705 07/13/2023    HGBA1C 4.7 07/13/2023    EAG 88 07/13/2023         Mental Status Evaluation:    Appearance:  disheveled, marginal hygiene   Behavior:  cooperative, guarded   Speech:  normal rate and volume   Mood:  "fine"   Affect:  constricted   Thought Process:  goal directed   Associations: intact associations   Thought Content:  paranoid ideation   Perceptual Disturbances: auditory hallucinations without commands and with derogatory comments   Risk Potential: Suicidal ideation - None at present  Homicidal ideation - None  Potential for aggression - Not at present   Sensorium:  oriented to person, place and time/date   Memory:  recent and remote memory grossly intact   Consciousness:  alert and awake   Attention/Concentration: attention span and concentration appear shorter than expected for age   Insight:  limited   Judgment: limited   Gait/Station: in bed   Motor Activity: no abnormal movements     Progress Toward Goals: slow progress    Recommended Treatment:   See above for assessment and plan.      Risks, benefits and possible side effects of Medications:   Risks, benefits, and possible side effects of medications explained to patient and patient verbalizes understanding. Treatment discussed with nursing staff. This note has been constructed using a voice recognition system. There may be translation, syntax, or grammatical errors. If you have any questions, please contact the dictating provider.     Byron Cazares MD

## 2023-08-06 PROCEDURE — 99232 SBSQ HOSP IP/OBS MODERATE 35: CPT | Performed by: PSYCHIATRY & NEUROLOGY

## 2023-08-06 RX ADMIN — MIRTAZAPINE 30 MG: 15 TABLET, FILM COATED ORAL at 21:13

## 2023-08-06 RX ADMIN — OLANZAPINE 20 MG: 10 TABLET, FILM COATED ORAL at 21:13

## 2023-08-06 RX ADMIN — ESCITALOPRAM OXALATE 10 MG: 10 TABLET ORAL at 09:49

## 2023-08-06 RX ADMIN — BENZTROPINE MESYLATE 0.5 MG: 0.5 TABLET ORAL at 09:48

## 2023-08-06 RX ADMIN — LITHIUM CARBONATE 450 MG: 450 TABLET, EXTENDED RELEASE ORAL at 21:13

## 2023-08-06 RX ADMIN — NICOTINE POLACRILEX 4 MG: 4 GUM, CHEWING BUCCAL at 18:45

## 2023-08-06 RX ADMIN — HALOPERIDOL 10 MG: 10 TABLET ORAL at 18:53

## 2023-08-06 RX ADMIN — HALOPERIDOL 10 MG: 10 TABLET ORAL at 09:48

## 2023-08-06 RX ADMIN — BENZTROPINE MESYLATE 0.5 MG: 0.5 TABLET ORAL at 18:53

## 2023-08-06 NOTE — PLAN OF CARE
Problem: Alteration in Thoughts and Perception  Goal: Treatment Goal: Gain control of psychotic behaviors/thinking, reduce/eliminate presenting symptoms and demonstrate improved reality functioning upon discharge  Outcome: Progressing  Goal: Verbalize thoughts and feelings  Description: Interventions:  - Promote a nonjudgmental and trusting relationship with the patient through active listening and therapeutic communication  - Assess patient's level of functioning, behavior and potential for risk  - Engage patient in 1 on 1 interactions  - Encourage patient to express fears, feelings, frustrations, and discuss symptoms    - [place] patient to reality, help patient recognize reality-based thinking   - Administer medications as ordered and assess for potential side effects  - Provide the patient education related to the signs and symptoms of the illness and desired effects of prescribed medications  Outcome: Progressing  Goal: Refrain from acting on delusional thinking/internal stimuli  Description: Interventions:  - Monitor patient closely, per order   - Utilize least restrictive measures   - Set reasonable limits, give positive feedback for acceptable   - Administer medications as ordered and monitor of potential side effects  Outcome: Progressing  Goal: Agree to be compliant with medication regime, as prescribed and report medication side effects  Description: Interventions:  - Offer appropriate PRN medication and supervise ingestion; conduct AIMS, as needed   Outcome: Progressing  Goal: Attend and participate in unit activities, including therapeutic, recreational, and educational groups  Description: Interventions:  -Encourage Visitation and family involvement in care  Outcome: Progressing  Goal: Recognize dysfunctional thoughts, communicate reality-based thoughts at the time of discharge  Description: Interventions:  - Provide medication and psycho-education to assist patient in compliance and developing insight into his/her illness   Outcome: Progressing  Goal: Complete daily ADLs, including personal hygiene independently, as able  Description: Interventions:  - Observe, teach, and assist patient with ADLS  - Monitor and promote a balance of rest/activity, with adequate nutrition and elimination   Outcome: Progressing     Problem: Ineffective Coping  Goal: Identifies ineffective coping skills  Outcome: Progressing  Goal: Identifies healthy coping skills  Outcome: Progressing  Goal: Demonstrates healthy coping skills  Outcome: Progressing  Goal: Participates in unit activities  Description: Interventions:  - Provide therapeutic environment   - Provide required programming   - Redirect inappropriate behaviors   Outcome: Progressing  Goal: Patient/Family participate in treatment and DC plans  Description: Interventions:  - Provide therapeutic environment  Outcome: Progressing  Goal: Patient/Family verbalizes awareness of resources  Outcome: Progressing  Goal: Understands least restrictive measures  Description: Interventions:  - Utilize least restrictive behavior  Outcome: Progressing  Goal: Free from restraint events  Description: - Utilize least restrictive measures   - Provide behavioral interventions   - Redirect inappropriate behaviors   Outcome: Progressing     Problem: Alteration in Orientation  Goal: Treatment Goal: Demonstrate a reduction of confusion and improved orientation to person, place, time and/or situation upon discharge, according to optimum baseline/ability  Outcome: Progressing  Goal: Interact with staff daily  Description: Interventions:  - Assess and re-assess patient's level of orientation  - Engage patient in 1 on 1 interactions, daily, for a minimum of 15 minutes   - Establish rapport/trust with patient   Outcome: Progressing  Goal: Express concerns related to confused thinking related to:  Description: Interventions:  - Encourage patient to express feelings, fears, frustrations, hopes  - Assign consistent caregivers   - [place]/re-orient patient as needed  - Allow comfort items, as appropriate  - Provide visual cues, signs, etc.   Outcome: Progressing  Goal: Allow medical examinations, as recommended  Description: Interventions:  - Provide physical/neurological exams and/or referrals, per provider   Outcome: Progressing  Goal: Cooperate with recommended testing/procedures  Description: Interventions:  - Determine need for ancillary testing  - Observe for mental status changes  - Implement falls/precaution protocol   Outcome: Progressing  Goal: Attend and participate in unit activities, including therapeutic, recreational, and educational groups  Description: Interventions:  - Provide therapeutic and educational activities daily, encourage attendance and participation, and document same in the medical record   - Provide appropriate opportunities for reminiscence   - Provide a consistent daily routine   - Encourage family contact/visitation   Outcome: Progressing  Goal: Complete daily ADLs, including personal hygiene independently, as able  Description: Interventions:  - Observe, teach, and assist patient with ADLS  Outcome: Progressing

## 2023-08-06 NOTE — NURSING NOTE
Pt withdrawn to room, denies SI/HI/VH reports AH as his baseline. Pt naps frequently throughout the day. Pt continues to require reminder to get medications.

## 2023-08-06 NOTE — NURSING NOTE
Patient appears to have slept the night. Observed to be resting in bed with eyes closed, respirations even and unlabored. No s/s of distress. Q 7.5 minute checks ongoing for patient safety.

## 2023-08-06 NOTE — PLAN OF CARE
Problem: Alteration in Thoughts and Perception  Goal: Verbalize thoughts and feelings  Description: Interventions:  - Promote a nonjudgmental and trusting relationship with the patient through active listening and therapeutic communication  - Assess patient's level of functioning, behavior and potential for risk  - Engage patient in 1 on 1 interactions  - Encourage patient to express fears, feelings, frustrations, and discuss symptoms    - [place] patient to reality, help patient recognize reality-based thinking   - Administer medications as ordered and assess for potential side effects  - Provide the patient education related to the signs and symptoms of the illness and desired effects of prescribed medications  Outcome: Progressing  Goal: Refrain from acting on delusional thinking/internal stimuli  Description: Interventions:  - Monitor patient closely, per order   - Utilize least restrictive measures   - Set reasonable limits, give positive feedback for acceptable   - Administer medications as ordered and monitor of potential side effects  Outcome: Progressing  Goal: Recognize dysfunctional thoughts, communicate reality-based thoughts at the time of discharge  Description: Interventions:  - Provide medication and psycho-education to assist patient in compliance and developing insight into his/her illness   Outcome: Progressing  Goal: Complete daily ADLs, including personal hygiene independently, as able  Description: Interventions:  - Observe, teach, and assist patient with ADLS  - Monitor and promote a balance of rest/activity, with adequate nutrition and elimination   Outcome: Progressing     Problem: Ineffective Coping  Goal: Identifies ineffective coping skills  Outcome: Progressing

## 2023-08-06 NOTE — PROGRESS NOTES
Progress Note - [facility] 33 y.o. male MRN: [id number]  Unit/Bed#: [facility] 216-01 Encounter: [id number]    Assessment/Plan   Principal Problem:    Schizophrenia [facility])  Active Problems:    Medical clearance for psychiatric admission    Tobacco abuse    T wave inversion in EKG    • Continue medications as noted below. • Li lvl on 8/9/23  • Continue to promote patient participation in group therapy, milieu therapy, occupational therapy  • Continue medical management by primary team.  • Discharge disposition:  pending    Subjective: The patient was evaluated this morning for continuity of care and no acute distress noted throughout the evaluation. Over the past 24 hours staff noted that patient has been napping throughout the day, social with his roommate and some of the other patients while watching TV in the day room. Continues to report auditory hallucinations. Patient has been medication adherent. Today on evaluation, Alberto states that he is feeling "the same "overall. He reports that depression has been improving, and he denies uncontrolled anxiety at this time. States that he still is experiencing auditory hallucinations consisting of multiple voices telling him to go to hell, he notes that he has been ignoring them. Reports no changes otherwise since yesterday. In terms of safety, Alberto Denies SI/HI. Denies visual hallucinations. Reports feeling safe on the unit.     Psychiatric Review of Systems:  Behavior over the last 24 hours:  unchanged  Sleep: hypersomnia  Appetite: Decreased  Medication side effects: No   ROS: no complaints, all other systems are negative    Current Medications:  Current Facility-Administered Medications   Medication Dose Route Frequency Provider Last Rate   • acetaminophen  650 mg Oral Q6H PRN Naya Cummins MD     • acetaminophen  650 mg Oral Q4H PRN Naya Cummins MD     • acetaminophen  975 mg Oral Q6H PRN Naya Cummins MD     • aluminum-magnesium hydroxide-simethicone  30 mL Oral Q4H PRN Veronica Warren MD     • haloperidol lactate  2.5 mg Intramuscular Q4H PRN Max 4/day Veronica Warren MD      And   • LORazepam  1 mg Intramuscular Q4H PRN Max 4/day Veronica Warren MD      And   • benztropine  0.5 mg Intramuscular Q4H PRN Max 4/day Veronica Warren MD     • haloperidol lactate  5 mg Intramuscular Q4H PRN Max 4/day Veronica Warren MD      And   • LORazepam  2 mg Intramuscular Q4H PRN Max 4/day Veronica Warren MD      And   • benztropine  1 mg Intramuscular Q4H PRN Max 4/day Veronica Warren MD     • benztropine  0.5 mg Oral BID Steven Thomas DO     • benztropine  1 mg Oral Q4H PRN Max 6/day Veronica Warren MD     • bisacodyl  10 mg Rectal Daily PRN Veronica Warren MD     • hydrOXYzine HCL  50 mg Oral Q6H PRN Max 4/day Veronica Warren MD      Or   • diphenhydrAMINE  50 mg Intramuscular Q6H PRN Veronica Warren MD     • escitalopram  10 mg Oral Daily Veronica Warren MD     • haloperidol  1 mg Oral Q6H PRN Veronica Warren MD     • haloperidol  10 mg Oral BID Veronica Warren MD     • haloperidol  2.5 mg Oral Q4H PRN Max 4/day Veronica Warren MD     • haloperidol  5 mg Oral Q4H PRN Max 4/day Veronica Warren MD     • hydrOXYzine HCL  100 mg Oral Q6H PRN Max 4/day Veronica Warren MD      Or   • LORazepam  2 mg Intramuscular Q6H PRN Veronica Warren MD     • hydrOXYzine HCL  25 mg Oral Q6H PRN Max 4/day Veronica Warren MD     • lithium carbonate  450 mg Oral HS Junaid Allison MD     • mirtazapine  30 mg Oral HS Junaid Allison MD     • nicotine polacrilex  4 mg Oral Q2H PRN Veronica Warren MD     • OLANZapine  20 mg Oral HS Junaid Allison MD     • polyethylene glycol  17 g Oral Daily PRN Veronica Warren MD     • senna-docusate sodium  1 tablet Oral Daily PRN Veronica Warren MD     • traZODone  50 mg Oral HS PRN Veronica Warren MD         Behavioral Health Medications: all current active meds have been reviewed and continue current psychiatric medications. Vital signs in last 24 hours:  Temp:  [97.3 °F (36.3 °C)-99.1 °F (37.3 °C)] 97.3 °F (36.3 °C)  HR:  [84-94] 84  Resp:  [17-18] 17  BP: (136-141)/(87-90) 141/90    Laboratory results:    I have personally reviewed all pertinent laboratory/tests results.   Most Recent Labs:   Lab Results   Component Value Date    WBC 8.69 07/13/2023    RBC 5.05 07/13/2023    HGB 12.5 07/13/2023    HCT 40.9 07/13/2023     07/13/2023    RDW 13.2 07/13/2023    NEUTROABS 4.09 07/13/2023    SODIUM 139 07/13/2023    K 4.1 07/13/2023     07/13/2023    CO2 27 07/13/2023    BUN 11 07/13/2023    CREATININE 0.95 07/13/2023    GLUC 81 07/13/2023    GLUF 81 07/13/2023    CALCIUM 9.1 07/13/2023    AST 59 (H) 07/13/2023     (H) 07/13/2023    ALKPHOS 89 07/13/2023    TP 6.9 07/13/2023    ALB 3.9 07/13/2023    TBILI 0.44 07/13/2023    CHOLESTEROL 205 (H) 07/13/2023    HDL 48 07/13/2023    TRIG 86 07/13/2023    LDLCALC 140 (H) 07/13/2023    NONHDLC 157 07/13/2023    YFI7MWQQDOVZ 1.705 07/13/2023    HGBA1C 4.7 07/13/2023    EAG 88 07/13/2023         Mental Status Evaluation:    Appearance:  age appropriate, casually dressed, disheveled   Behavior:   Cooperative, calm; guarded   Speech:  soft   Mood:  "The same"   Affect:  blunted   Thought Process:  goal directed   Associations: intact associations   Thought Content:  some paranoia   Perceptual Disturbances: auditory hallucinations without commands and with derogatory comments   Risk Potential: Suicidal ideation - None  Homicidal ideation - None  Potential for aggression - No   Sensorium:  oriented to person, place and time/date   Memory:  recent and remote memory grossly intact   Consciousness:  alert and awake   Attention/Concentration: attention span and concentration appear shorter than expected for age   Insight:  limited   Judgment: limited   Gait/Station: normal gait/station Motor Activity: no abnormal movements     Progress Toward Goals: progressing    Recommended Treatment:   See above for assessment and plan. Risks, benefits and possible side effects of Medications:   Risks, benefits, and possible side effects of medications explained to patient and patient verbalizes understanding. Treatment discussed with nursing staff. This note has been constructed using a voice recognition system. There may be translation, syntax, or grammatical errors. If you have any questions, please contact the dictating provider.     Carmen Vargas MD

## 2023-08-06 NOTE — NURSING NOTE
Pt received prn Trazodone 50 mg for sleep at 2117. At this time, 2317, pt is observed laying in bed with eyes closed. Medication effective.

## 2023-08-06 NOTE — NURSING NOTE
Pt napping at times throughout the day. Social with roommate and some peers while watching tv in dayroom. Denies SI/HI/VH. AH continue, but are not bothersome at this time. Brightens on approach, pleasant in conversation.

## 2023-08-06 NOTE — NURSING NOTE
Ally Mims is pleasant upon approach, visualized resting in bed and napping intermittently this afternoon and early evening. Patient continues to deny SI/HI/VH, reports ongoing AH, describes as quiet and not bothersome. Ally Mims remains compliant with prescribed medication, does not attend scheduled groups this afternoon, despite encouragement from staff. Patient does get out of bed and join peers for meals. Ally Mims was encouraged to seek staff with any questions and/or concerns, verbalized understanding.

## 2023-08-07 PROCEDURE — 99232 SBSQ HOSP IP/OBS MODERATE 35: CPT | Performed by: STUDENT IN AN ORGANIZED HEALTH CARE EDUCATION/TRAINING PROGRAM

## 2023-08-07 RX ORDER — TRAZODONE HYDROCHLORIDE 50 MG/1
50 TABLET ORAL
Status: DISCONTINUED | OUTPATIENT
Start: 2023-08-07 | End: 2023-08-15 | Stop reason: HOSPADM

## 2023-08-07 RX ADMIN — BENZTROPINE MESYLATE 0.5 MG: 0.5 TABLET ORAL at 19:00

## 2023-08-07 RX ADMIN — OLANZAPINE 20 MG: 10 TABLET, FILM COATED ORAL at 21:11

## 2023-08-07 RX ADMIN — HALOPERIDOL 10 MG: 10 TABLET ORAL at 08:49

## 2023-08-07 RX ADMIN — ESCITALOPRAM OXALATE 10 MG: 10 TABLET ORAL at 08:50

## 2023-08-07 RX ADMIN — MIRTAZAPINE 30 MG: 15 TABLET, FILM COATED ORAL at 21:11

## 2023-08-07 RX ADMIN — TRAZODONE HYDROCHLORIDE 50 MG: 50 TABLET ORAL at 21:11

## 2023-08-07 RX ADMIN — HALOPERIDOL 10 MG: 10 TABLET ORAL at 19:00

## 2023-08-07 RX ADMIN — BENZTROPINE MESYLATE 0.5 MG: 0.5 TABLET ORAL at 08:49

## 2023-08-07 RX ADMIN — LITHIUM CARBONATE 450 MG: 450 TABLET, EXTENDED RELEASE ORAL at 21:11

## 2023-08-07 NOTE — PLAN OF CARE
Problem: Alteration in Thoughts and Perception  Goal: Treatment Goal: Gain control of psychotic behaviors/thinking, reduce/eliminate presenting symptoms and demonstrate improved reality functioning upon discharge  Outcome: Progressing  Goal: Verbalize thoughts and feelings  Description: Interventions:  - Promote a nonjudgmental and trusting relationship with the patient through active listening and therapeutic communication  - Assess patient's level of functioning, behavior and potential for risk  - Engage patient in 1 on 1 interactions  - Encourage patient to express fears, feelings, frustrations, and discuss symptoms    - [place] patient to reality, help patient recognize reality-based thinking   - Administer medications as ordered and assess for potential side effects  - Provide the patient education related to the signs and symptoms of the illness and desired effects of prescribed medications  Outcome: Progressing  Goal: Refrain from acting on delusional thinking/internal stimuli  Description: Interventions:  - Monitor patient closely, per order   - Utilize least restrictive measures   - Set reasonable limits, give positive feedback for acceptable   - Administer medications as ordered and monitor of potential side effects  Outcome: Progressing  Goal: Agree to be compliant with medication regime, as prescribed and report medication side effects  Description: Interventions:  - Offer appropriate PRN medication and supervise ingestion; conduct AIMS, as needed   Outcome: Progressing  Goal: Recognize dysfunctional thoughts, communicate reality-based thoughts at the time of discharge  Description: Interventions:  - Provide medication and psycho-education to assist patient in compliance and developing insight into his/her illness   Outcome: Progressing  Goal: Complete daily ADLs, including personal hygiene independently, as able  Description: Interventions:  - Observe, teach, and assist patient with ADLS  - Monitor and promote a balance of rest/activity, with adequate nutrition and elimination   Outcome: Progressing     Problem: Ineffective Coping  Goal: Identifies ineffective coping skills  Outcome: Progressing  Goal: Identifies healthy coping skills  Outcome: Progressing  Goal: Demonstrates healthy coping skills  Outcome: Progressing  Goal: Patient/Family verbalizes awareness of resources  Outcome: Progressing  Goal: Understands least restrictive measures  Description: Interventions:  - Utilize least restrictive behavior  Outcome: Progressing  Goal: Free from restraint events  Description: - Utilize least restrictive measures   - Provide behavioral interventions   - Redirect inappropriate behaviors   Outcome: Progressing     Problem: Alteration in Orientation  Goal: Treatment Goal: Demonstrate a reduction of confusion and improved orientation to person, place, time and/or situation upon discharge, according to optimum baseline/ability  Outcome: Progressing  Goal: Interact with staff daily  Description: Interventions:  - Assess and re-assess patient's level of orientation  - Engage patient in 1 on 1 interactions, daily, for a minimum of 15 minutes   - Establish rapport/trust with patient   Outcome: Progressing  Goal: Express concerns related to confused thinking related to:  Description: Interventions:  - Encourage patient to express feelings, fears, frustrations, hopes  - Assign consistent caregivers   - Copen/re-orient patient as needed  - Allow comfort items, as appropriate  - Provide visual cues, signs, etc.   Outcome: Progressing  Goal: Allow medical examinations, as recommended  Description: Interventions:  - Provide physical/neurological exams and/or referrals, per provider   Outcome: Progressing  Goal: Cooperate with recommended testing/procedures  Description: Interventions:  - Determine need for ancillary testing  - Observe for mental status changes  - Implement falls/precaution protocol   Outcome: Progressing  Goal: Attend and participate in unit activities, including therapeutic, recreational, and educational groups  Description: Interventions:  - Provide therapeutic and educational activities daily, encourage attendance and participation, and document same in the medical record   - Provide appropriate opportunities for reminiscence   - Provide a consistent daily routine   - Encourage family contact/visitation   Outcome: Progressing  Goal: Complete daily ADLs, including personal hygiene independently, as able  Description: Interventions:  - Observe, teach, and assist patient with ADLS  Outcome: Progressing

## 2023-08-07 NOTE — NURSING NOTE
Gerri López remains in bed at this time. He has not attending groups throughout the day. He is encouraged to engage in tx program. He responds by smiling. He denies SI and HI at this time.  He endorses AH's, "They're chill right now."

## 2023-08-07 NOTE — PROGRESS NOTES
Progress Note - [facility] 33 y.o. male MRN: [id number]  Unit/Bed#: [facility] 216-01 Encounter: [id number]    Assessment/Plan   Principal Problem:    Schizophrenia [facility])  Active Problems:    Medical clearance for psychiatric admission    Tobacco abuse    T wave inversion in EKG      Subjective: Patient was seen, chart was reviewed, and case was discussed with the team. Patient appears withdrawn, disheveled, malodorous and wearing ear plugs. Patient endorses depressed mood, anxiety, paranoia and negative voices. Sleep and appetite are ok. He is compliant with medications. Denies any side effects.    Behavior over the last 24 hours:  unchanged  Sleep: normal  Appetite: normal  Medication side effects: No    Medical ROS: Pertinent items are noted in HPI.all other systems are negative    Current Medications:  Current Facility-Administered Medications   Medication Dose Route Frequency   • acetaminophen (TYLENOL) tablet 650 mg  650 mg Oral Q6H PRN   • acetaminophen (TYLENOL) tablet 650 mg  650 mg Oral Q4H PRN   • acetaminophen (TYLENOL) tablet 975 mg  975 mg Oral Q6H PRN   • aluminum-magnesium hydroxide-simethicone (MAALOX) oral suspension 30 mL  30 mL Oral Q4H PRN   • haloperidol lactate (HALDOL) injection 2.5 mg  2.5 mg Intramuscular Q4H PRN Max 4/day    And   • LORazepam (ATIVAN) injection 1 mg  1 mg Intramuscular Q4H PRN Max 4/day    And   • benztropine (COGENTIN) injection 0.5 mg  0.5 mg Intramuscular Q4H PRN Max 4/day   • haloperidol lactate (HALDOL) injection 5 mg  5 mg Intramuscular Q4H PRN Max 4/day    And   • LORazepam (ATIVAN) injection 2 mg  2 mg Intramuscular Q4H PRN Max 4/day    And   • benztropine (COGENTIN) injection 1 mg  1 mg Intramuscular Q4H PRN Max 4/day   • benztropine (COGENTIN) tablet 0.5 mg  0.5 mg Oral BID   • benztropine (COGENTIN) tablet 1 mg  1 mg Oral Q4H PRN Max 6/day   • bisacodyl (DULCOLAX) rectal suppository 10 mg  10 mg Rectal Daily PRN   • hydrOXYzine HCL (ATARAX) tablet 50 mg  50 mg Oral Q6H PRN Max 4/day    Or   • diphenhydrAMINE (BENADRYL) injection 50 mg  50 mg Intramuscular Q6H PRN   • escitalopram (LEXAPRO) tablet 10 mg  10 mg Oral Daily   • haloperidol (HALDOL) tablet 1 mg  1 mg Oral Q6H PRN   • haloperidol (HALDOL) tablet 10 mg  10 mg Oral BID   • haloperidol (HALDOL) tablet 2.5 mg  2.5 mg Oral Q4H PRN Max 4/day   • haloperidol (HALDOL) tablet 5 mg  5 mg Oral Q4H PRN Max 4/day   • hydrOXYzine HCL (ATARAX) tablet 100 mg  100 mg Oral Q6H PRN Max 4/day    Or   • LORazepam (ATIVAN) injection 2 mg  2 mg Intramuscular Q6H PRN   • hydrOXYzine HCL (ATARAX) tablet 25 mg  25 mg Oral Q6H PRN Max 4/day   • lithium carbonate (LITHOBID) CR tablet 450 mg  450 mg Oral HS   • mirtazapine (REMERON) tablet 30 mg  30 mg Oral HS   • nicotine polacrilex (NICORETTE) gum 4 mg  4 mg Oral Q2H PRN   • OLANZapine (ZyPREXA) tablet 20 mg  20 mg Oral HS   • polyethylene glycol (MIRALAX) packet 17 g  17 g Oral Daily PRN   • senna-docusate sodium (SENOKOT S) 8.6-50 mg per tablet 1 tablet  1 tablet Oral Daily PRN   • traZODone (DESYREL) tablet 50 mg  50 mg Oral HS PRN       Behavioral Health Medications:   all current active meds have been reviewed. Vitals:  Vitals:    08/07/23 0823   BP: 110/75   Pulse: 81   Resp: 16   Temp: 97.7 °F (36.5 °C)   SpO2:        Laboratory results:    I have personally reviewed all pertinent laboratory/tests results. Mental Status Evaluation:    Appearance:  disheveled and older than stated age   Behavior:  guarded   Speech:  soft   Mood:  anxious and depressed   Affect:  constricted and flat   Thought Process:  goal directed   Thought Content:  delusions  persecutory   Perceptual Disturbances:  Auditory hallucinations without commands   Risk Potential: Suicidal Ideations none  Homicidal Ideations none  Potential for Aggression No   Sensorium:  person, place and time/date   Memory:  recent and remote memory grossly intact   Consciousness:  alert and awake Attention: attention span appeared shorter than expected for age   Insight:  limited   Judgment: limited   Gait/Station: normal gait/station   Motor Activity: no abnormal movements       Progress Toward Goals: Progressing    Recommended Treatment: Continue with pharmacotherapy, group therapy, milieu therapy and occupational therapy. Risks, benefits and possible side effects of Medications:   Risks, benefits, alternatives, and possible side effects of patient's psychiatric medications were discussed with patient.

## 2023-08-07 NOTE — NURSING NOTE
Patient Is pleasant when approached. He is resting in bed. Reports sleeping well last night. He denies SI/HI and hallucinations. Declined to attend group at this time.

## 2023-08-07 NOTE — PROGRESS NOTES
Pt continues to be seclusive to self, takes naps. More visible in the evening and talked more to staff in the evening. DC: TBD      08/07/23 0392   Team Meeting   Meeting Type Daily Rounds   Team Members Present   Team Members Present Physician;Nurse;; Other (Discipline and Name)   Physician Team Member Dr. Len Blank / Dr. Byron Damian / Idalia Colbert / Ramu Noel Team Member [place] Management Team Member Melissa Irvin / Trista Tapia / Corrinne Sleeper / Idalia Lambert   Other (Discipline and Name) Ronal Rodrigez - Art Therapy   Patient/Family Present   Patient Present No   Patient's Family Present No

## 2023-08-08 PROCEDURE — 99232 SBSQ HOSP IP/OBS MODERATE 35: CPT | Performed by: STUDENT IN AN ORGANIZED HEALTH CARE EDUCATION/TRAINING PROGRAM

## 2023-08-08 RX ADMIN — TRAZODONE HYDROCHLORIDE 50 MG: 50 TABLET ORAL at 21:04

## 2023-08-08 RX ADMIN — BENZTROPINE MESYLATE 0.5 MG: 0.5 TABLET ORAL at 17:04

## 2023-08-08 RX ADMIN — BENZTROPINE MESYLATE 0.5 MG: 0.5 TABLET ORAL at 09:39

## 2023-08-08 RX ADMIN — ESCITALOPRAM OXALATE 10 MG: 10 TABLET ORAL at 09:39

## 2023-08-08 RX ADMIN — OLANZAPINE 20 MG: 10 TABLET, FILM COATED ORAL at 21:04

## 2023-08-08 RX ADMIN — HALOPERIDOL 10 MG: 10 TABLET ORAL at 17:04

## 2023-08-08 RX ADMIN — HALOPERIDOL 10 MG: 10 TABLET ORAL at 09:39

## 2023-08-08 RX ADMIN — NICOTINE POLACRILEX 4 MG: 4 GUM, CHEWING BUCCAL at 17:04

## 2023-08-08 RX ADMIN — MIRTAZAPINE 30 MG: 15 TABLET, FILM COATED ORAL at 21:04

## 2023-08-08 RX ADMIN — LITHIUM CARBONATE 450 MG: 450 TABLET, EXTENDED RELEASE ORAL at 21:04

## 2023-08-08 NOTE — NURSING NOTE
Pt reports sleeping well last night. He is currently napping in bed. Declined breakfast today stating, "I'm not hungry." Denies SI/HI and reports AH as manageable.

## 2023-08-08 NOTE — NURSING NOTE
Patient observed to be resting quietly with eyes closed throughout the night. Patient is still resting now. Q7 minute observational rounds maintained for promotion of patient safety.

## 2023-08-08 NOTE — TREATMENT PLAN
TREATMENT PLAN REVIEW [facility] 27 y.o. 1995 male MRN: [id number]    [place] Room / Bed: [facility] 216/[facility] 216-01 Encounter: [id number]          Admit Date/Time:  7/11/2023  9:52 PM    Treatment Team: Attending Provider: Jennifer Merritt MD; Consulting Physician: Chiki Kimble MD; : Leatha Hooker; Charge Nurse: Medhat Shi, RN; Care Manager: Dola Kehr, MIGUEL; Registered Nurse: Mara Horton, MIGUEL; Charge Nurse: Camila Weeks, RN; Patient Care Assistant: Aparna Nolen;  Occupational Therapy Assistant: Raul Oliver IV; Patient Care Assistant: Job De Leon; Patient Care Technician: Umu Soto    Diagnosis: Principal Problem:    Schizophrenia [facility]  Active Problems:    Medical clearance for psychiatric admission    Tobacco abuse    T wave inversion in EKG      Patient Strengths/Assets: cooperative, motivation for treatment/growth, patient is on a voluntary commitment    Patient Barriers/Limitations: limited support system, resistance to treatment    Short Term Goals: decrease in depressive symptoms, decrease in anxiety symptoms, decrease in paranoid thoughts, decrease in psychotic symptoms, decrease in suicidal thoughts, improvement in insight, sleep improvement, improvement in appetite, mood stabilization    Long Term Goals: improvement in depression, improvement in anxiety, resolution of depressive symptoms, stabilization of mood, free of suicidal thoughts, improved insight, acceptance of need for psychiatric medications, acceptance of need for psychiatric treatment, adequate self care, adequate sleep, adequate appetite    Progress Towards Goals: starting psychiatric medications as prescribed    Recommended Treatment: medication management, patient medication education, group therapy, milieu therapy, continued Behavioral Health psychiatric evaluation/assessment process    Treatment Frequency: daily medication monitoring, group and milieu therapy daily, monitoring through interdisciplinary rounds, monitoring through weekly patient care conferences    Expected Discharge Date:  5-7 days    Discharge Plan: referral for outpatient medication management with a psychiatrist, referral for outpatient psychotherapy    Treatment Plan Created/Updated By: Angelita Claire MD

## 2023-08-08 NOTE — PLAN OF CARE
Problem: Alteration in Thoughts and Perception  Goal: Treatment Goal: Gain control of psychotic behaviors/thinking, reduce/eliminate presenting symptoms and demonstrate improved reality functioning upon discharge  Outcome: Progressing  Goal: Verbalize thoughts and feelings  Description: Interventions:  - Promote a nonjudgmental and trusting relationship with the patient through active listening and therapeutic communication  - Assess patient's level of functioning, behavior and potential for risk  - Engage patient in 1 on 1 interactions  - Encourage patient to express fears, feelings, frustrations, and discuss symptoms    - [place] patient to reality, help patient recognize reality-based thinking   - Administer medications as ordered and assess for potential side effects  - Provide the patient education related to the signs and symptoms of the illness and desired effects of prescribed medications  Outcome: Progressing  Goal: Refrain from acting on delusional thinking/internal stimuli  Description: Interventions:  - Monitor patient closely, per order   - Utilize least restrictive measures   - Set reasonable limits, give positive feedback for acceptable   - Administer medications as ordered and monitor of potential side effects  Outcome: Progressing  Goal: Agree to be compliant with medication regime, as prescribed and report medication side effects  Description: Interventions:  - Offer appropriate PRN medication and supervise ingestion; conduct AIMS, as needed   Outcome: Progressing  Goal: Attend and participate in unit activities, including therapeutic, recreational, and educational groups  Description: Interventions:  -Encourage Visitation and family involvement in care  Outcome: Progressing  Goal: Recognize dysfunctional thoughts, communicate reality-based thoughts at the time of discharge  Description: Interventions:  - Provide medication and psycho-education to assist patient in compliance and developing insight into his/her illness   Outcome: Progressing  Goal: Complete daily ADLs, including personal hygiene independently, as able  Description: Interventions:  - Observe, teach, and assist patient with ADLS  - Monitor and promote a balance of rest/activity, with adequate nutrition and elimination   Outcome: Progressing

## 2023-08-08 NOTE — PROGRESS NOTES
Attended group alongside other people on the unit, participated in discussion around recovery-centered topic, and contributed their own lived experience toward connection between group members. 08/04/23 1230 08/04/23 1530   Activity/Group Checklist   Group Life Skills Other (Comment)  (3:30 PM Goal Accomplishments Group)   Attendance Attended  (Arrived after this group started, stayed until end of this group.) Attended   Attendance Duration (min) 31-45 46-60   Interactions Interacted appropriately Interacted appropriately   Affect/Mood Appropriate Appropriate   Goals Achieved Other (Comment)  (Engaged with CPS and peers on the unit by creating shared budget utilizing budgeting smiley on CPS’s work laptop. Explored free resources and [employer] / EBT benefits. [place] from peer about impact of affordable fun on mental health.) Other (Comment)  (Engaged with CPS and peers on the unit by sorting and compiling scattered screenshots of mental health awareness video by person with 5 mental health diagnoses and their partner.  Expressed first impressions of big emotions in video.)

## 2023-08-08 NOTE — PROGRESS NOTES
Pt reported no changes in symptoms, continues to report AH. Visible in the evening playing cards with peers. DC: TBD      08/08/23 9344   Team Meeting   Meeting Type Daily Rounds   Team Members Present   Team Members Present Physician;Nurse;; Other (Discipline and Name)   Physician Team Member Dr. Chanel Santiago / Dr. Nehemiah Jaramillo / Yoli Claros Team Member [facility] Management Team Member Bora Sanchez / Vinod Simeon / Jerilyn Angeles / Maryann Ortiz   Other (Discipline and Name) Stacey Zavaleta - Pharmacist, [address] [place] [address] Facilitator   Patient/Family Present   Patient Present No   Patient's Family Present No

## 2023-08-09 PROCEDURE — 99232 SBSQ HOSP IP/OBS MODERATE 35: CPT | Performed by: STUDENT IN AN ORGANIZED HEALTH CARE EDUCATION/TRAINING PROGRAM

## 2023-08-09 RX ADMIN — OLANZAPINE 20 MG: 10 TABLET, FILM COATED ORAL at 21:15

## 2023-08-09 RX ADMIN — HALOPERIDOL 10 MG: 10 TABLET ORAL at 17:01

## 2023-08-09 RX ADMIN — NICOTINE POLACRILEX 4 MG: 4 GUM, CHEWING BUCCAL at 17:01

## 2023-08-09 RX ADMIN — BENZTROPINE MESYLATE 0.5 MG: 0.5 TABLET ORAL at 09:37

## 2023-08-09 RX ADMIN — HALOPERIDOL 10 MG: 10 TABLET ORAL at 09:37

## 2023-08-09 RX ADMIN — LITHIUM CARBONATE 450 MG: 450 TABLET, EXTENDED RELEASE ORAL at 21:15

## 2023-08-09 RX ADMIN — MIRTAZAPINE 30 MG: 15 TABLET, FILM COATED ORAL at 21:15

## 2023-08-09 RX ADMIN — TRAZODONE HYDROCHLORIDE 50 MG: 50 TABLET ORAL at 21:15

## 2023-08-09 RX ADMIN — BENZTROPINE MESYLATE 0.5 MG: 0.5 TABLET ORAL at 17:01

## 2023-08-09 RX ADMIN — ESCITALOPRAM OXALATE 10 MG: 10 TABLET ORAL at 09:37

## 2023-08-09 NOTE — NURSING NOTE
Pt has been seclusive to room throughout the morning and napping. OOB for lunch, reports he is not usually hungry at breakfast time. Denies SI or thoughts of self harm. Reports AH are still present and can be bothersome at times. Pt states distraction methods are sometimes helpful. Pt denies any questions or concerns.

## 2023-08-09 NOTE — PROGRESS NOTES
Pt pleasant, calm, continues to report AH. Out in the evening playing cards with peers. Lithium level tonight. DC: TBD      08/09/23 9657   Team Meeting   Meeting Type Daily Rounds   Team Members Present   Team Members Present Physician;Nurse;; Other (Discipline and Name)   Physician Team Member Dr. Chetna Flores / Dr. Israel Trejo / Opal Gibbs / Terrell Parkinson Team Member Michelle Lubin / [facility] Management Team Member Melania Underwood / Perez Tellez / Britton Jamison / Wolfgang Braga   Other (Discipline and Name) [facility] - Art Therapy, [address] [place] [address] Facilitator   Patient/Family Present   Patient Present No   Patient's Family Present No

## 2023-08-09 NOTE — CASE MANAGEMENT
CM called [facility] ([phone]) to see if pt's [place] Medicaid is active yet and still shows as "not active" at this time. CM will contact family to see if they heard anything regarding pt's application. Pt's [place] medicaid was terminated on 7/31/23 per Jerilyn Man from  who was completing pt insurance reviews.

## 2023-08-09 NOTE — NURSING NOTE
Pt was pleasant calm and cooperative. No abnormal behaviors or delusions observed. Denies SI/HI/AVH.

## 2023-08-09 NOTE — PROGRESS NOTES
Progress Note - [facility] 33 y.o. male MRN: [id number]  Unit/Bed#: [facility] 216-01 Encounter: [id number]    Assessment/Plan   Principal Problem:    Schizophrenia [facility])  Active Problems:    Medical clearance for psychiatric admission    Tobacco abuse    T wave inversion in EKG      Subjective: Patient was seen, chart was reviewed, and case was discussed with the team. Patient appears withdrawn, disheveled, malodorous and scant. He continues to endorse paranoia and doesn't feel safe outside the hospital. Denies any history of violence or legal issues. He continues to endorse voices which gets worse during night. Sleep and appetite are ok. He is compliant with medications. Denies any side effects.    Behavior over the last 24 hours:  unchanged  Sleep: normal  Appetite: normal  Medication side effects: No    Medical ROS: Pertinent items are noted in HPI.all other systems are negative    Current Medications:  Current Facility-Administered Medications   Medication Dose Route Frequency   • acetaminophen (TYLENOL) tablet 650 mg  650 mg Oral Q6H PRN   • acetaminophen (TYLENOL) tablet 650 mg  650 mg Oral Q4H PRN   • acetaminophen (TYLENOL) tablet 975 mg  975 mg Oral Q6H PRN   • aluminum-magnesium hydroxide-simethicone (MAALOX) oral suspension 30 mL  30 mL Oral Q4H PRN   • haloperidol lactate (HALDOL) injection 2.5 mg  2.5 mg Intramuscular Q4H PRN Max 4/day    And   • LORazepam (ATIVAN) injection 1 mg  1 mg Intramuscular Q4H PRN Max 4/day    And   • benztropine (COGENTIN) injection 0.5 mg  0.5 mg Intramuscular Q4H PRN Max 4/day   • haloperidol lactate (HALDOL) injection 5 mg  5 mg Intramuscular Q4H PRN Max 4/day    And   • LORazepam (ATIVAN) injection 2 mg  2 mg Intramuscular Q4H PRN Max 4/day    And   • benztropine (COGENTIN) injection 1 mg  1 mg Intramuscular Q4H PRN Max 4/day   • benztropine (COGENTIN) tablet 0.5 mg  0.5 mg Oral BID   • benztropine (COGENTIN) tablet 1 mg  1 mg Oral Q4H PRN Max 6/day   • bisacodyl (DULCOLAX) rectal suppository 10 mg  10 mg Rectal Daily PRN   • hydrOXYzine HCL (ATARAX) tablet 50 mg  50 mg Oral Q6H PRN Max 4/day    Or   • diphenhydrAMINE (BENADRYL) injection 50 mg  50 mg Intramuscular Q6H PRN   • escitalopram (LEXAPRO) tablet 10 mg  10 mg Oral Daily   • haloperidol (HALDOL) tablet 1 mg  1 mg Oral Q6H PRN   • haloperidol (HALDOL) tablet 10 mg  10 mg Oral BID   • haloperidol (HALDOL) tablet 2.5 mg  2.5 mg Oral Q4H PRN Max 4/day   • haloperidol (HALDOL) tablet 5 mg  5 mg Oral Q4H PRN Max 4/day   • hydrOXYzine HCL (ATARAX) tablet 100 mg  100 mg Oral Q6H PRN Max 4/day    Or   • LORazepam (ATIVAN) injection 2 mg  2 mg Intramuscular Q6H PRN   • hydrOXYzine HCL (ATARAX) tablet 25 mg  25 mg Oral Q6H PRN Max 4/day   • lithium carbonate (LITHOBID) CR tablet 450 mg  450 mg Oral HS   • mirtazapine (REMERON) tablet 30 mg  30 mg Oral HS   • nicotine polacrilex (NICORETTE) gum 4 mg  4 mg Oral Q2H PRN   • OLANZapine (ZyPREXA) tablet 20 mg  20 mg Oral HS   • polyethylene glycol (MIRALAX) packet 17 g  17 g Oral Daily PRN   • senna-docusate sodium (SENOKOT S) 8.6-50 mg per tablet 1 tablet  1 tablet Oral Daily PRN   • traZODone (DESYREL) tablet 50 mg  50 mg Oral HS       Behavioral Health Medications:   all current active meds have been reviewed. Vitals:  Vitals:    08/09/23 0747   BP: 112/70   Pulse: 78   Resp: 18   Temp: 98.6 °F (37 °C)   SpO2:        Laboratory results:    I have personally reviewed all pertinent laboratory/tests results. Mental Status Evaluation:    Appearance:  disheveled   Behavior:  guarded   Speech:  soft   Mood:  "ok"   Affect:  blunted and constricted   Thought Process:  goal directed and logical   Thought Content:  delusions  obsessive/rumination   Perceptual Disturbances:  Auditory hallucinations without commands   Risk Potential: Suicidal Ideations none  Homicidal Ideations none  Potential for Aggression No   Sensorium:  person, place and time/date   Memory:  recent and remote memory grossly intact   Consciousness:  alert and awake    Attention: attention span appeared shorter than expected for age   Insight:  limited   Judgment: limited   Gait/Station: normal gait/station   Motor Activity: no abnormal movements       Progress Toward Goals: Progressing    Recommended Treatment: Continue with pharmacotherapy, group therapy, milieu therapy and occupational therapy. 1.Labs for tonight  Risks, benefits and possible side effects of Medications:   Risks, benefits, alternatives, and possible side effects of patient's psychiatric medications were discussed with patient.

## 2023-08-10 LAB
ALBUMIN SERPL BCP-MCNC: 4.1 G/DL (ref 3.5–5)
ALP SERPL-CCNC: 73 U/L (ref 34–104)
ALT SERPL W P-5'-P-CCNC: 71 U/L (ref 7–52)
ANION GAP SERPL CALCULATED.3IONS-SCNC: 6 MMOL/L
AST SERPL W P-5'-P-CCNC: 32 U/L (ref 13–39)
BASOPHILS # BLD AUTO: 0.07 THOUSANDS/ÂΜL (ref 0–0.1)
BASOPHILS NFR BLD AUTO: 1 % (ref 0–1)
BILIRUB SERPL-MCNC: 0.45 MG/DL (ref 0.2–1)
BUN SERPL-MCNC: 14 MG/DL (ref 5–25)
CALCIUM SERPL-MCNC: 9.4 MG/DL (ref 8.4–10.2)
CHLORIDE SERPL-SCNC: 104 MMOL/L (ref 96–108)
CO2 SERPL-SCNC: 27 MMOL/L (ref 21–32)
CREAT SERPL-MCNC: 1.04 MG/DL (ref 0.6–1.3)
EOSINOPHIL # BLD AUTO: 0.37 THOUSAND/ÂΜL (ref 0–0.61)
EOSINOPHIL NFR BLD AUTO: 5 % (ref 0–6)
ERYTHROCYTE [DISTWIDTH] IN BLOOD BY AUTOMATED COUNT: 13.2 % (ref 11.6–15.1)
GFR SERPL CREATININE-BSD FRML MDRD: 98 ML/MIN/1.73SQ M
GLUCOSE P FAST SERPL-MCNC: 87 MG/DL (ref 65–99)
GLUCOSE SERPL-MCNC: 87 MG/DL (ref 65–140)
HCT VFR BLD AUTO: 42.4 % (ref 36.5–49.3)
HGB BLD-MCNC: 12.9 G/DL (ref 12–17)
IMM GRANULOCYTES # BLD AUTO: 0.03 THOUSAND/UL (ref 0–0.2)
IMM GRANULOCYTES NFR BLD AUTO: 0 % (ref 0–2)
LITHIUM SERPL-SCNC: 0.4 MMOL/L (ref 0.6–1.2)
LYMPHOCYTES # BLD AUTO: 2.49 THOUSANDS/ÂΜL (ref 0.6–4.47)
LYMPHOCYTES NFR BLD AUTO: 33 % (ref 14–44)
MCH RBC QN AUTO: 24.4 PG (ref 26.8–34.3)
MCHC RBC AUTO-ENTMCNC: 30.4 G/DL (ref 31.4–37.4)
MCV RBC AUTO: 80 FL (ref 82–98)
MONOCYTES # BLD AUTO: 0.85 THOUSAND/ÂΜL (ref 0.17–1.22)
MONOCYTES NFR BLD AUTO: 11 % (ref 4–12)
NEUTROPHILS # BLD AUTO: 3.84 THOUSANDS/ÂΜL (ref 1.85–7.62)
NEUTS SEG NFR BLD AUTO: 50 % (ref 43–75)
NRBC BLD AUTO-RTO: 0 /100 WBCS
PLATELET # BLD AUTO: 273 THOUSANDS/UL (ref 149–390)
PMV BLD AUTO: 10.1 FL (ref 8.9–12.7)
POTASSIUM SERPL-SCNC: 4.3 MMOL/L (ref 3.5–5.3)
PROT SERPL-MCNC: 7.7 G/DL (ref 6.4–8.4)
RBC # BLD AUTO: 5.28 MILLION/UL (ref 3.88–5.62)
SODIUM SERPL-SCNC: 137 MMOL/L (ref 135–147)
WBC # BLD AUTO: 7.65 THOUSAND/UL (ref 4.31–10.16)

## 2023-08-10 PROCEDURE — 85025 COMPLETE CBC W/AUTO DIFF WBC: CPT | Performed by: STUDENT IN AN ORGANIZED HEALTH CARE EDUCATION/TRAINING PROGRAM

## 2023-08-10 PROCEDURE — 80178 ASSAY OF LITHIUM: CPT | Performed by: STUDENT IN AN ORGANIZED HEALTH CARE EDUCATION/TRAINING PROGRAM

## 2023-08-10 PROCEDURE — 80053 COMPREHEN METABOLIC PANEL: CPT | Performed by: STUDENT IN AN ORGANIZED HEALTH CARE EDUCATION/TRAINING PROGRAM

## 2023-08-10 PROCEDURE — 99232 SBSQ HOSP IP/OBS MODERATE 35: CPT | Performed by: STUDENT IN AN ORGANIZED HEALTH CARE EDUCATION/TRAINING PROGRAM

## 2023-08-10 RX ORDER — LITHIUM CARBONATE 300 MG/1
600 TABLET, FILM COATED, EXTENDED RELEASE ORAL
Status: DISCONTINUED | OUTPATIENT
Start: 2023-08-10 | End: 2023-08-15 | Stop reason: HOSPADM

## 2023-08-10 RX ADMIN — HALOPERIDOL 10 MG: 10 TABLET ORAL at 17:21

## 2023-08-10 RX ADMIN — BENZTROPINE MESYLATE 0.5 MG: 0.5 TABLET ORAL at 17:21

## 2023-08-10 RX ADMIN — TRAZODONE HYDROCHLORIDE 50 MG: 50 TABLET ORAL at 21:28

## 2023-08-10 RX ADMIN — OLANZAPINE 20 MG: 10 TABLET, FILM COATED ORAL at 21:28

## 2023-08-10 RX ADMIN — BENZTROPINE MESYLATE 0.5 MG: 0.5 TABLET ORAL at 09:45

## 2023-08-10 RX ADMIN — MIRTAZAPINE 30 MG: 15 TABLET, FILM COATED ORAL at 21:28

## 2023-08-10 RX ADMIN — LITHIUM CARBONATE 600 MG: 300 TABLET, EXTENDED RELEASE ORAL at 21:28

## 2023-08-10 RX ADMIN — HALOPERIDOL 10 MG: 10 TABLET ORAL at 09:45

## 2023-08-10 RX ADMIN — NICOTINE POLACRILEX 4 MG: 4 GUM, CHEWING BUCCAL at 17:21

## 2023-08-10 RX ADMIN — ESCITALOPRAM OXALATE 10 MG: 10 TABLET ORAL at 09:45

## 2023-08-10 NOTE — PROGRESS NOTES
30 day treatment plan reviewed. Diagnosis of Schizophrenia reviewed. Short term goals for decrease in depressive symptoms, decrease in anxiety symptoms, decrease in paranoid thoughts, decrease in psychotic symptoms, decrease in suicidal thoughts, improvement in insight, sleep improvement, improvement in appetite, mood stabilization discussed. All present parties in agreement and treatment plan signed.     08/09/23 0625   Team Meeting   Meeting Type Tx Team Meeting   Team Members Present   Team Members Present Physician;Nurse;   Physician Team Member Dr. Daniela Lenz Team Member [facility] Management Team Member Swap[place]   Patient/Family Present   Patient Present No  (pt declined to meet with treatment team)   Patient's Family Present No

## 2023-08-10 NOTE — NURSING NOTE
Pleasant and cooperative,  Unable to get blood work tonight , will refer to lab in am, still hears voices stated they are always present

## 2023-08-10 NOTE — NURSING NOTE
Pt is seclusive to room and napping in bed throughout the day. OOB for meds when prompted this morning and attended meals. Denies any questions or concerns. Denies SI/HI. Intermittent AH.

## 2023-08-10 NOTE — PLAN OF CARE
Problem: Alteration in Thoughts and Perception  Goal: Verbalize thoughts and feelings  Description: Interventions:  - Promote a nonjudgmental and trusting relationship with the patient through active listening and therapeutic communication  - Assess patient's level of functioning, behavior and potential for risk  - Engage patient in 1 on 1 interactions  - Encourage patient to express fears, feelings, frustrations, and discuss symptoms    - [place] patient to reality, help patient recognize reality-based thinking   - Administer medications as ordered and assess for potential side effects  - Provide the patient education related to the signs and symptoms of the illness and desired effects of prescribed medications  Outcome: Progressing     Problem: Alteration in Thoughts and Perception  Goal: Agree to be compliant with medication regime, as prescribed and report medication side effects  Description: Interventions:  - Offer appropriate PRN medication and supervise ingestion; conduct AIMS, as needed   Outcome: Progressing

## 2023-08-10 NOTE — PROGRESS NOTES
Progress Note - [facility] 33 y.o. male MRN: [id number]  [facility] Encounter: [id number]    Assessment/Plan   Principal Problem:    Schizophrenia [facility])  Active Problems:    Medical clearance for psychiatric admission    Tobacco abuse    T wave inversion in EKG      Subjective: Patient was seen, chart was reviewed, and case was discussed with the team. Patient appears withdrawn, isolative, scant and unkempt. hecontinues to endorse voices and paranoia. Reports that voices were loud last night. Sleep was disturbed. Appetite is ok. He is compliant with medications. Lithium level 0.4.  Denies any side effects  Behavior over the last 24 hours:  unchanged  Sleep: normal  Appetite: normal  Medication side effects: No    Medical ROS: Pertinent items are noted in HPI.all other systems are negative    Current Medications:  Current Facility-Administered Medications   Medication Dose Route Frequency   • acetaminophen (TYLENOL) tablet 650 mg  650 mg Oral Q6H PRN   • acetaminophen (TYLENOL) tablet 650 mg  650 mg Oral Q4H PRN   • acetaminophen (TYLENOL) tablet 975 mg  975 mg Oral Q6H PRN   • aluminum-magnesium hydroxide-simethicone (MAALOX) oral suspension 30 mL  30 mL Oral Q4H PRN   • haloperidol lactate (HALDOL) injection 2.5 mg  2.5 mg Intramuscular Q4H PRN Max 4/day    And   • LORazepam (ATIVAN) injection 1 mg  1 mg Intramuscular Q4H PRN Max 4/day    And   • benztropine (COGENTIN) injection 0.5 mg  0.5 mg Intramuscular Q4H PRN Max 4/day   • haloperidol lactate (HALDOL) injection 5 mg  5 mg Intramuscular Q4H PRN Max 4/day    And   • LORazepam (ATIVAN) injection 2 mg  2 mg Intramuscular Q4H PRN Max 4/day    And   • benztropine (COGENTIN) injection 1 mg  1 mg Intramuscular Q4H PRN Max 4/day   • benztropine (COGENTIN) tablet 0.5 mg  0.5 mg Oral BID   • benztropine (COGENTIN) tablet 1 mg  1 mg Oral Q4H PRN Max 6/day   • bisacodyl (DULCOLAX) rectal suppository 10 mg  10 mg Rectal Daily PRN   • hydrOXYzine HCL (ATARAX) tablet 50 mg  50 mg Oral Q6H PRN Max 4/day    Or   • diphenhydrAMINE (BENADRYL) injection 50 mg  50 mg Intramuscular Q6H PRN   • escitalopram (LEXAPRO) tablet 10 mg  10 mg Oral Daily   • haloperidol (HALDOL) tablet 1 mg  1 mg Oral Q6H PRN   • haloperidol (HALDOL) tablet 10 mg  10 mg Oral BID   • haloperidol (HALDOL) tablet 2.5 mg  2.5 mg Oral Q4H PRN Max 4/day   • haloperidol (HALDOL) tablet 5 mg  5 mg Oral Q4H PRN Max 4/day   • hydrOXYzine HCL (ATARAX) tablet 100 mg  100 mg Oral Q6H PRN Max 4/day    Or   • LORazepam (ATIVAN) injection 2 mg  2 mg Intramuscular Q6H PRN   • hydrOXYzine HCL (ATARAX) tablet 25 mg  25 mg Oral Q6H PRN Max 4/day   • lithium carbonate (LITHOBID) CR tablet 600 mg  600 mg Oral HS   • mirtazapine (REMERON) tablet 30 mg  30 mg Oral HS   • nicotine polacrilex (NICORETTE) gum 4 mg  4 mg Oral Q2H PRN   • OLANZapine (ZyPREXA) tablet 20 mg  20 mg Oral HS   • polyethylene glycol (MIRALAX) packet 17 g  17 g Oral Daily PRN   • senna-docusate sodium (SENOKOT S) 8.6-50 mg per tablet 1 tablet  1 tablet Oral Daily PRN   • traZODone (DESYREL) tablet 50 mg  50 mg Oral HS       Behavioral Health Medications:   all current active meds have been reviewed. Vitals:  Vitals:    08/10/23 0748   BP: 134/86   Pulse: 79   Resp: 18   Temp: 98.1 °F (36.7 °C)   SpO2:        Laboratory results:    I have personally reviewed all pertinent laboratory/tests results. Mental Status Evaluation:    Appearance:  age appropriate   Behavior:  guarded   Speech:  soft   Mood:  depressed   Affect:  constricted and flat   Thought Process:  logical   Thought Content:  delusions  obsessive/rumination   Perceptual Disturbances:  Auditory hallucinations without commands   Risk Potential: Suicidal Ideations none  Homicidal Ideations none  Potential for Aggression No   Sensorium:  person, place and time/date   Memory:  recent and remote memory grossly intact   Consciousness:  alert and awake    Attention: attention span appeared shorter than expected for age   Insight:  limited   Judgment: limited   Gait/Station: normal gait/station   Motor Activity: no abnormal movements       Progress Toward Goals: Progressing    Recommended Treatment: Continue with pharmacotherapy, group therapy, milieu therapy and occupational therapy. 1.Increase lithium to 600 Mg Po HS  Risks, benefits and possible side effects of Medications:   Risks, benefits, alternatives, and possible side effects of patient's psychiatric medications were discussed with patient.

## 2023-08-10 NOTE — PLAN OF CARE
Problem: Alteration in Thoughts and Perception  Goal: Treatment Goal: Gain control of psychotic behaviors/thinking, reduce/eliminate presenting symptoms and demonstrate improved reality functioning upon discharge  Outcome: Progressing  Goal: Verbalize thoughts and feelings  Description: Interventions:  - Promote a nonjudgmental and trusting relationship with the patient through active listening and therapeutic communication  - Assess patient's level of functioning, behavior and potential for risk  - Engage patient in 1 on 1 interactions  - Encourage patient to express fears, feelings, frustrations, and discuss symptoms    - [place] patient to reality, help patient recognize reality-based thinking   - Administer medications as ordered and assess for potential side effects  - Provide the patient education related to the signs and symptoms of the illness and desired effects of prescribed medications  Outcome: Progressing  Goal: Refrain from acting on delusional thinking/internal stimuli  Description: Interventions:  - Monitor patient closely, per order   - Utilize least restrictive measures   - Set reasonable limits, give positive feedback for acceptable   - Administer medications as ordered and monitor of potential side effects  Outcome: Progressing  Goal: Agree to be compliant with medication regime, as prescribed and report medication side effects  Description: Interventions:  - Offer appropriate PRN medication and supervise ingestion; conduct AIMS, as needed   Outcome: Progressing  Goal: Attend and participate in unit activities, including therapeutic, recreational, and educational groups  Description: Interventions:  -Encourage Visitation and family involvement in care  Outcome: Progressing  Goal: Recognize dysfunctional thoughts, communicate reality-based thoughts at the time of discharge  Description: Interventions:  - Provide medication and psycho-education to assist patient in compliance and developing insight into his/her illness   Outcome: Progressing  Goal: Complete daily ADLs, including personal hygiene independently, as able  Description: Interventions:  - Observe, teach, and assist patient with ADLS  - Monitor and promote a balance of rest/activity, with adequate nutrition and elimination   Outcome: Progressing

## 2023-08-10 NOTE — PROGRESS NOTES
Pt seclusive to room and self. Napping off and on. AH present but manageable. Unable to get labs. DC: Tuesday      08/10/23 8673   Team Meeting   Meeting Type Daily Rounds   Team Members Present   Team Members Present Physician;Nurse;; Other (Discipline and Name)   Physician Team Member Dr. Kimber Amos / Dr. Toribio Schilling / Siena Phillip / Esther Kraus Team Member Judy Gibson / Didi Pereira / Adelita Jiménez Management Team Member Terrence Lin / Sebastien De Leon   Other (Discipline and Name) Sharlette Dinning - Art Therapy   Patient/Family Present   Patient Present No   Patient's Family Present No

## 2023-08-11 PROCEDURE — 99232 SBSQ HOSP IP/OBS MODERATE 35: CPT | Performed by: STUDENT IN AN ORGANIZED HEALTH CARE EDUCATION/TRAINING PROGRAM

## 2023-08-11 RX ADMIN — LITHIUM CARBONATE 600 MG: 300 TABLET, EXTENDED RELEASE ORAL at 21:10

## 2023-08-11 RX ADMIN — TRAZODONE HYDROCHLORIDE 50 MG: 50 TABLET ORAL at 21:10

## 2023-08-11 RX ADMIN — BENZTROPINE MESYLATE 0.5 MG: 0.5 TABLET ORAL at 09:47

## 2023-08-11 RX ADMIN — NICOTINE POLACRILEX 4 MG: 4 GUM, CHEWING BUCCAL at 17:31

## 2023-08-11 RX ADMIN — HALOPERIDOL 10 MG: 10 TABLET ORAL at 09:47

## 2023-08-11 RX ADMIN — ESCITALOPRAM OXALATE 10 MG: 10 TABLET ORAL at 09:47

## 2023-08-11 RX ADMIN — HALOPERIDOL 10 MG: 10 TABLET ORAL at 17:29

## 2023-08-11 RX ADMIN — OLANZAPINE 20 MG: 10 TABLET, FILM COATED ORAL at 21:10

## 2023-08-11 RX ADMIN — MIRTAZAPINE 30 MG: 15 TABLET, FILM COATED ORAL at 21:10

## 2023-08-11 RX ADMIN — BENZTROPINE MESYLATE 0.5 MG: 0.5 TABLET ORAL at 17:29

## 2023-08-11 NOTE — CASE MANAGEMENT
CM assisted pt in meeting with [employer] for [place] application. [place] informed him that [place] was notified that his [address] is now terminated as of 7/31/23 so that may help with process for him to become active for [place] medicaid. Pt verbalized understanding and met with KEND[employer].

## 2023-08-11 NOTE — PROGRESS NOTES
Progress Note - [facility] 33 y.o. male MRN: [id number]  Unit/Bed#: [facility] 216-01 Encounter: [id number]    Assessment/Plan   Principal Problem:    Schizophrenia [facility])  Active Problems:    Medical clearance for psychiatric admission    Tobacco abuse    T wave inversion in EKG      Subjective: Patient was seen, chart was reviewed, and case was discussed with the team. Patient appears withdrawn, unkempt, scant and coherent. He continues to endorse paranoia and threatening voices to hurt him. How ever they are decreasing in intensity. Sleep has improved. He is compliant with medications. Denies any side effects.    Behavior over the last 24 hours:  unchanged  Sleep: normal  Appetite: normal  Medication side effects: No    Medical ROS: Pertinent items are noted in HPI.all other systems are negative    Current Medications:  Current Facility-Administered Medications   Medication Dose Route Frequency   • acetaminophen (TYLENOL) tablet 650 mg  650 mg Oral Q6H PRN   • acetaminophen (TYLENOL) tablet 650 mg  650 mg Oral Q4H PRN   • acetaminophen (TYLENOL) tablet 975 mg  975 mg Oral Q6H PRN   • aluminum-magnesium hydroxide-simethicone (MAALOX) oral suspension 30 mL  30 mL Oral Q4H PRN   • haloperidol lactate (HALDOL) injection 2.5 mg  2.5 mg Intramuscular Q4H PRN Max 4/day    And   • LORazepam (ATIVAN) injection 1 mg  1 mg Intramuscular Q4H PRN Max 4/day    And   • benztropine (COGENTIN) injection 0.5 mg  0.5 mg Intramuscular Q4H PRN Max 4/day   • haloperidol lactate (HALDOL) injection 5 mg  5 mg Intramuscular Q4H PRN Max 4/day    And   • LORazepam (ATIVAN) injection 2 mg  2 mg Intramuscular Q4H PRN Max 4/day    And   • benztropine (COGENTIN) injection 1 mg  1 mg Intramuscular Q4H PRN Max 4/day   • benztropine (COGENTIN) tablet 0.5 mg  0.5 mg Oral BID   • benztropine (COGENTIN) tablet 1 mg  1 mg Oral Q4H PRN Max 6/day   • bisacodyl (DULCOLAX) rectal suppository 10 mg  10 mg Rectal Daily PRN   • hydrOXYzine HCL (ATARAX) tablet 50 mg  50 mg Oral Q6H PRN Max 4/day    Or   • diphenhydrAMINE (BENADRYL) injection 50 mg  50 mg Intramuscular Q6H PRN   • escitalopram (LEXAPRO) tablet 10 mg  10 mg Oral Daily   • haloperidol (HALDOL) tablet 1 mg  1 mg Oral Q6H PRN   • haloperidol (HALDOL) tablet 10 mg  10 mg Oral BID   • haloperidol (HALDOL) tablet 2.5 mg  2.5 mg Oral Q4H PRN Max 4/day   • haloperidol (HALDOL) tablet 5 mg  5 mg Oral Q4H PRN Max 4/day   • hydrOXYzine HCL (ATARAX) tablet 100 mg  100 mg Oral Q6H PRN Max 4/day    Or   • LORazepam (ATIVAN) injection 2 mg  2 mg Intramuscular Q6H PRN   • hydrOXYzine HCL (ATARAX) tablet 25 mg  25 mg Oral Q6H PRN Max 4/day   • lithium carbonate (LITHOBID) CR tablet 600 mg  600 mg Oral HS   • mirtazapine (REMERON) tablet 30 mg  30 mg Oral HS   • nicotine polacrilex (NICORETTE) gum 4 mg  4 mg Oral Q2H PRN   • OLANZapine (ZyPREXA) tablet 20 mg  20 mg Oral HS   • polyethylene glycol (MIRALAX) packet 17 g  17 g Oral Daily PRN   • senna-docusate sodium (SENOKOT S) 8.6-50 mg per tablet 1 tablet  1 tablet Oral Daily PRN   • traZODone (DESYREL) tablet 50 mg  50 mg Oral HS       Behavioral Health Medications:   all current active meds have been reviewed. Vitals:  Vitals:    08/11/23 0741   BP: 102/71   Pulse: 75   Resp: 16   Temp: (!) 97.3 °F (36.3 °C)   SpO2:        Laboratory results:    I have personally reviewed all pertinent laboratory/tests results. Mental Status Evaluation:    Appearance:  age appropriate   Behavior:  guarded   Speech:  soft   Mood:  depressed   Affect:  blunted and constricted   Thought Process:  goal directed and logical   Thought Content:  delusions  obsessive/rumination   Perceptual Disturbances:  Auditory hallucinations without commands   Risk Potential: Suicidal Ideations none  Homicidal Ideations none  Potential for Aggression No   Sensorium:  person, place and time/date   Memory:  recent and remote memory grossly intact   Consciousness:  alert and awake Attention: attention span appeared shorter than expected for age   Insight:  limited   Judgment: limited   Gait/Station: normal gait/station   Motor Activity: no abnormal movements       Progress Toward Goals: Progressing    Recommended Treatment: Continue with pharmacotherapy, group therapy, milieu therapy and occupational therapy. Risks, benefits and possible side effects of Medications:   Risks, benefits, alternatives, and possible side effects of patient's psychiatric medications were discussed with patient.

## 2023-08-11 NOTE — NURSING NOTE
Pt denies SI/HI/VH AH are ongoing. Pt continues to need reminders to come and get his morning medications. Pt is isolative to room and appears to be napping throughout the day.

## 2023-08-11 NOTE — NURSING NOTE
Pt was seen in the activity room watching TV. He was pleasant and cooperative. Denies SI, HI, and AVH. Denies depression or anxiety. States that he has been eating and sleeping well.

## 2023-08-11 NOTE — NURSING NOTE
Patient was observed resting in bed, eyes closed, respirations regular. No s/s of respiratory distress noted.

## 2023-08-11 NOTE — PLAN OF CARE
Problem: Alteration in Thoughts and Perception  Goal: Treatment Goal: Gain control of psychotic behaviors/thinking, reduce/eliminate presenting symptoms and demonstrate improved reality functioning upon discharge  Outcome: Progressing  Goal: Verbalize thoughts and feelings  Description: Interventions:  - Promote a nonjudgmental and trusting relationship with the patient through active listening and therapeutic communication  - Assess patient's level of functioning, behavior and potential for risk  - Engage patient in 1 on 1 interactions  - Encourage patient to express fears, feelings, frustrations, and discuss symptoms    - [place] patient to reality, help patient recognize reality-based thinking   - Administer medications as ordered and assess for potential side effects  - Provide the patient education related to the signs and symptoms of the illness and desired effects of prescribed medications  Outcome: Progressing  Goal: Refrain from acting on delusional thinking/internal stimuli  Description: Interventions:  - Monitor patient closely, per order   - Utilize least restrictive measures   - Set reasonable limits, give positive feedback for acceptable   - Administer medications as ordered and monitor of potential side effects  Outcome: Progressing  Goal: Agree to be compliant with medication regime, as prescribed and report medication side effects  Description: Interventions:  - Offer appropriate PRN medication and supervise ingestion; conduct AIMS, as needed   Outcome: Progressing  Goal: Attend and participate in unit activities, including therapeutic, recreational, and educational groups  Description: Interventions:  -Encourage Visitation and family involvement in care  Outcome: Progressing  Goal: Recognize dysfunctional thoughts, communicate reality-based thoughts at the time of discharge  Description: Interventions:  - Provide medication and psycho-education to assist patient in compliance and developing insight into his/her illness   Outcome: Progressing  Goal: Complete daily ADLs, including personal hygiene independently, as able  Description: Interventions:  - Observe, teach, and assist patient with ADLS  - Monitor and promote a balance of rest/activity, with adequate nutrition and elimination   Outcome: Progressing     Problem: Ineffective Coping  Goal: Identifies ineffective coping skills  Outcome: Progressing  Goal: Identifies healthy coping skills  Outcome: Progressing  Goal: Demonstrates healthy coping skills  Outcome: Progressing  Goal: Participates in unit activities  Description: Interventions:  - Provide therapeutic environment   - Provide required programming   - Redirect inappropriate behaviors   Outcome: Progressing  Goal: Patient/Family participate in treatment and DC plans  Description: Interventions:  - Provide therapeutic environment  Outcome: Progressing  Goal: Patient/Family verbalizes awareness of resources  Outcome: Progressing  Goal: Understands least restrictive measures  Description: Interventions:  - Utilize least restrictive behavior  Outcome: Progressing  Goal: Free from restraint events  Description: - Utilize least restrictive measures   - Provide behavioral interventions   - Redirect inappropriate behaviors   Outcome: Progressing     Problem: Alteration in Orientation  Goal: Treatment Goal: Demonstrate a reduction of confusion and improved orientation to person, place, time and/or situation upon discharge, according to optimum baseline/ability  Outcome: Progressing  Goal: Interact with staff daily  Description: Interventions:  - Assess and re-assess patient's level of orientation  - Engage patient in 1 on 1 interactions, daily, for a minimum of 15 minutes   - Establish rapport/trust with patient   Outcome: Progressing  Goal: Express concerns related to confused thinking related to:  Description: Interventions:  - Encourage patient to express feelings, fears, frustrations, hopes  - Assign consistent caregivers   - [place]/re-orient patient as needed  - Allow comfort items, as appropriate  - Provide visual cues, signs, etc.   Outcome: Progressing  Goal: Allow medical examinations, as recommended  Description: Interventions:  - Provide physical/neurological exams and/or referrals, per provider   Outcome: Progressing  Goal: Cooperate with recommended testing/procedures  Description: Interventions:  - Determine need for ancillary testing  - Observe for mental status changes  - Implement falls/precaution protocol   Outcome: Progressing  Goal: Attend and participate in unit activities, including therapeutic, recreational, and educational groups  Description: Interventions:  - Provide therapeutic and educational activities daily, encourage attendance and participation, and document same in the medical record   - Provide appropriate opportunities for reminiscence   - Provide a consistent daily routine   - Encourage family contact/visitation   Outcome: Progressing  Goal: Complete daily ADLs, including personal hygiene independently, as able  Description: Interventions:  - Observe, teach, and assist patient with ADLS  Outcome: Progressing

## 2023-08-12 PROCEDURE — 99232 SBSQ HOSP IP/OBS MODERATE 35: CPT | Performed by: PHYSICIAN ASSISTANT

## 2023-08-12 RX ADMIN — NICOTINE POLACRILEX 4 MG: 4 GUM, CHEWING BUCCAL at 17:07

## 2023-08-12 RX ADMIN — OLANZAPINE 20 MG: 10 TABLET, FILM COATED ORAL at 21:16

## 2023-08-12 RX ADMIN — HALOPERIDOL 10 MG: 10 TABLET ORAL at 17:06

## 2023-08-12 RX ADMIN — MIRTAZAPINE 30 MG: 15 TABLET, FILM COATED ORAL at 21:16

## 2023-08-12 RX ADMIN — BENZTROPINE MESYLATE 0.5 MG: 0.5 TABLET ORAL at 17:07

## 2023-08-12 RX ADMIN — LITHIUM CARBONATE 600 MG: 300 TABLET, EXTENDED RELEASE ORAL at 21:16

## 2023-08-12 RX ADMIN — TRAZODONE HYDROCHLORIDE 50 MG: 50 TABLET ORAL at 21:16

## 2023-08-12 RX ADMIN — BENZTROPINE MESYLATE 0.5 MG: 0.5 TABLET ORAL at 09:09

## 2023-08-12 RX ADMIN — HALOPERIDOL 10 MG: 10 TABLET ORAL at 09:10

## 2023-08-12 RX ADMIN — ESCITALOPRAM OXALATE 10 MG: 10 TABLET ORAL at 09:10

## 2023-08-12 NOTE — NURSING NOTE
Patient sleeping in his room, declined breakfast. He reports sleeping through the night but still being tired.  Denies SI/HI and has ongoing AH that he says are "managable."

## 2023-08-12 NOTE — PROGRESS NOTES
Progress Note - [facility] 33 y.o. male MRN: [id number]  Unit/Bed#: [facility] 216-01 Encounter: [id number]    Ross Lopez was seen for follow-up, chart reviewed and discussed with nursing staff. Nursing staff notes he has been compliant with medications and treatment plan. Sleeping and eating adequately. Tends to be seclusive to self and to room. Continues with auditory hallucinations which are his baseline. No aggression or agitation. Patient seen in his room lying in bed. States that he continues with auditory hallucinations that say they are going to "kill him". Denies command hallucinations. No visual hallucinations. Denies suicidal or homicidal ideation. States although he continues with hallucinations they are less disruptive. Feels that medications have been helpful. Denies any adverse effects with his medications. Physically reports no pain or discomfort.     Behavior over the last 24 hours:  unchanged  Sleep: normal  Appetite: normal  Medication side effects: No  ROS: no complaints  /69 (BP Location: Left arm)   Pulse 79   Temp 98 °F (36.7 °C) (Tympanic)   Resp 18   Ht 5' 7" (1.702 m)   Wt 103 kg (226 lb 12.8 oz)   SpO2 99%   BMI 35.52 kg/m²     Medications:   Current Facility-Administered Medications   Medication Dose Route Frequency   • acetaminophen (TYLENOL) tablet 650 mg  650 mg Oral Q6H PRN   • acetaminophen (TYLENOL) tablet 650 mg  650 mg Oral Q4H PRN   • acetaminophen (TYLENOL) tablet 975 mg  975 mg Oral Q6H PRN   • aluminum-magnesium hydroxide-simethicone (MAALOX) oral suspension 30 mL  30 mL Oral Q4H PRN   • haloperidol lactate (HALDOL) injection 2.5 mg  2.5 mg Intramuscular Q4H PRN Max 4/day    And   • LORazepam (ATIVAN) injection 1 mg  1 mg Intramuscular Q4H PRN Max 4/day    And   • benztropine (COGENTIN) injection 0.5 mg  0.5 mg Intramuscular Q4H PRN Max 4/day   • haloperidol lactate (HALDOL) injection 5 mg  5 mg Intramuscular Q4H PRN Max 4/day    And • LORazepam (ATIVAN) injection 2 mg  2 mg Intramuscular Q4H PRN Max 4/day    And   • benztropine (COGENTIN) injection 1 mg  1 mg Intramuscular Q4H PRN Max 4/day   • benztropine (COGENTIN) tablet 0.5 mg  0.5 mg Oral BID   • benztropine (COGENTIN) tablet 1 mg  1 mg Oral Q4H PRN Max 6/day   • bisacodyl (DULCOLAX) rectal suppository 10 mg  10 mg Rectal Daily PRN   • hydrOXYzine HCL (ATARAX) tablet 50 mg  50 mg Oral Q6H PRN Max 4/day    Or   • diphenhydrAMINE (BENADRYL) injection 50 mg  50 mg Intramuscular Q6H PRN   • escitalopram (LEXAPRO) tablet 10 mg  10 mg Oral Daily   • haloperidol (HALDOL) tablet 1 mg  1 mg Oral Q6H PRN   • haloperidol (HALDOL) tablet 10 mg  10 mg Oral BID   • haloperidol (HALDOL) tablet 2.5 mg  2.5 mg Oral Q4H PRN Max 4/day   • haloperidol (HALDOL) tablet 5 mg  5 mg Oral Q4H PRN Max 4/day   • hydrOXYzine HCL (ATARAX) tablet 100 mg  100 mg Oral Q6H PRN Max 4/day    Or   • LORazepam (ATIVAN) injection 2 mg  2 mg Intramuscular Q6H PRN   • hydrOXYzine HCL (ATARAX) tablet 25 mg  25 mg Oral Q6H PRN Max 4/day   • lithium carbonate (LITHOBID) CR tablet 600 mg  600 mg Oral HS   • mirtazapine (REMERON) tablet 30 mg  30 mg Oral HS   • nicotine polacrilex (NICORETTE) gum 4 mg  4 mg Oral Q2H PRN   • OLANZapine (ZyPREXA) tablet 20 mg  20 mg Oral HS   • polyethylene glycol (MIRALAX) packet 17 g  17 g Oral Daily PRN   • senna-docusate sodium (SENOKOT S) 8.6-50 mg per tablet 1 tablet  1 tablet Oral Daily PRN   • traZODone (DESYREL) tablet 50 mg  50 mg Oral HS       Labs:   Admission on 07/11/2023   Component Date Value Ref Range Status   • Color,  07/12/2023 Yellow   Final   • Clarity,  07/12/2023 Clear   Final   • Specific Gravity,  07/12/2023 1.025  1.005 - 1.030 Final   • pH, UA 07/12/2023 6.0  4.5, 5.0, 5.5, 6.0, 6.5, 7.0, 7.5, 8.0 Final   • Leukocytes, UA 07/12/2023 Negative  Negative Final   • Nitrite, UA 07/12/2023 Negative  Negative Final   • Protein, UA 07/12/2023 Negative  Negative mg/dl Final • Glucose, UA 07/12/2023 Negative  Negative mg/dl Final   • Ketones, UA 07/12/2023 Negative  Negative mg/dl Final   • Urobilinogen, UA 07/12/2023 <2.0  <2.0 mg/dl mg/dl Final   • Bilirubin, UA 07/12/2023 Negative  Negative Final   • Occult Blood, UA 07/12/2023 Negative  Negative Final   • RBC, UA 07/12/2023 None Seen  None Seen, 2-4 /hpf Final   • WBC, UA 07/12/2023 None Seen  None Seen, 2-4, 5-60 /hpf Final   • Epithelial Cells 07/12/2023 Occasional  None Seen, Occasional /hpf Final   • Bacteria, UA 07/12/2023 None Seen  None Seen, Occasional /hpf Final   • WBC 07/13/2023 8.69  4.31 - 10.16 Thousand/uL Final   • RBC 07/13/2023 5.05  3.88 - 5.62 Million/uL Final   • Hemoglobin 07/13/2023 12.5  12.0 - 17.0 g/dL Final   • Hematocrit 07/13/2023 40.9  36.5 - 49.3 % Final   • MCV 07/13/2023 81 (L)  82 - 98 fL Final   • MCH 07/13/2023 24.8 (L)  26.8 - 34.3 pg Final   • MCHC 07/13/2023 30.6 (L)  31.4 - 37.4 g/dL Final   • RDW 07/13/2023 13.2  11.6 - 15.1 % Final   • MPV 07/13/2023 10.4  8.9 - 12.7 fL Final   • Platelets 35/74/8809 208  149 - 390 Thousands/uL Final   • nRBC 07/13/2023 0  /100 WBCs Final   • Neutrophils Relative 07/13/2023 46  43 - 75 % Final   • Immat GRANS % 07/13/2023 1  0 - 2 % Final   • Lymphocytes Relative 07/13/2023 32  14 - 44 % Final   • Monocytes Relative 07/13/2023 11  4 - 12 % Final   • Eosinophils Relative 07/13/2023 9 (H)  0 - 6 % Final   • Basophils Relative 07/13/2023 1  0 - 1 % Final   • Neutrophils Absolute 07/13/2023 4.09  1.85 - 7.62 Thousands/µL Final   • Immature Grans Absolute 07/13/2023 0.05  0.00 - 0.20 Thousand/uL Final   • Lymphocytes Absolute 07/13/2023 2.76  0.60 - 4.47 Thousands/µL Final   • Monocytes Absolute 07/13/2023 0.95  0.17 - 1.22 Thousand/µL Final   • Eosinophils Absolute 07/13/2023 0.78 (H)  0.00 - 0.61 Thousand/µL Final   • Basophils Absolute 07/13/2023 0.06  0.00 - 0.10 Thousands/µL Final   • Sodium 07/13/2023 139  135 - 147 mmol/L Final   • Potassium 07/13/2023 4.1  3.5 - 5.3 mmol/L Final   • Chloride 07/13/2023 105  96 - 108 mmol/L Final   • CO2 07/13/2023 27  21 - 32 mmol/L Final   • ANION GAP 07/13/2023 7  mmol/L Final   • BUN 07/13/2023 11  5 - 25 mg/dL Final   • Creatinine 07/13/2023 0.95  0.60 - 1.30 mg/dL Final   • Glucose 07/13/2023 81  65 - 140 mg/dL Final   • Glucose, Fasting 07/13/2023 81  65 - 99 mg/dL Final   • Calcium 07/13/2023 9.1  8.4 - 10.2 mg/dL Final   • AST 07/13/2023 59 (H)  13 - 39 U/L Final   • ALT 07/13/2023 163 (H)  7 - 52 U/L Final   • Alkaline Phosphatase 07/13/2023 89  34 - 104 U/L Final   • Total Protein 07/13/2023 6.9  6.4 - 8.4 g/dL Final   • Albumin 07/13/2023 3.9  3.5 - 5.0 g/dL Final   • Total Bilirubin 07/13/2023 0.44  0.20 - 1.00 mg/dL Final   • eGFR 07/13/2023 110  ml/min/1.73sq m Final   • Folate 07/13/2023 6.4  >5.9 ng/mL Final   • Cholesterol 07/13/2023 205 (H)  See Comment mg/dL Final   • Triglycerides 07/13/2023 86  See Comment mg/dL Final   • HDL, Direct 07/13/2023 48  >=40 mg/dL Final   • LDL Calculated 07/13/2023 140 (H)  0 - 100 mg/dL Final   • Non-HDL-Chol (CHOL-HDL) 07/13/2023 157  mg/dl Final   • TSH 3RD GENERATON 07/13/2023 1.705  0.450 - 4.500 uIU/mL Final   • Vitamin B-12 07/13/2023 250  180 - 914 pg/mL Final   • Vit D, 25-Hydroxy 07/13/2023 8.8 (L)  30.0 - 100.0 ng/mL Final   • Hemoglobin A1C 07/13/2023 4.7  Normal 3.8-5.6%; PreDiabetic 5.7-6.4%;  Diabetic >=6.5%; Glycemic control for adults with diabetes <7.0% % Final   • EAG 07/13/2023 88  mg/dl Final   • Syphilis Total Antibody 07/13/2023 Non-reactive  Non-Reactive Final   • WBC 08/10/2023 7.65  4.31 - 10.16 Thousand/uL Final   • RBC 08/10/2023 5.28  3.88 - 5.62 Million/uL Final   • Hemoglobin 08/10/2023 12.9  12.0 - 17.0 g/dL Final   • Hematocrit 08/10/2023 42.4  36.5 - 49.3 % Final   • MCV 08/10/2023 80 (L)  82 - 98 fL Final   • MCH 08/10/2023 24.4 (L)  26.8 - 34.3 pg Final   • MCHC 08/10/2023 30.4 (L)  31.4 - 37.4 g/dL Final   • RDW 08/10/2023 13.2  11.6 - 15.1 % Final   • MPV 08/10/2023 10.1  8.9 - 12.7 fL Final   • Platelets 09/40/4435 273  149 - 390 Thousands/uL Final   • nRBC 08/10/2023 0  /100 WBCs Final   • Neutrophils Relative 08/10/2023 50  43 - 75 % Final   • Immat GRANS % 08/10/2023 0  0 - 2 % Final   • Lymphocytes Relative 08/10/2023 33  14 - 44 % Final   • Monocytes Relative 08/10/2023 11  4 - 12 % Final   • Eosinophils Relative 08/10/2023 5  0 - 6 % Final   • Basophils Relative 08/10/2023 1  0 - 1 % Final   • Neutrophils Absolute 08/10/2023 3.84  1.85 - 7.62 Thousands/µL Final   • Immature Grans Absolute 08/10/2023 0.03  0.00 - 0.20 Thousand/uL Final   • Lymphocytes Absolute 08/10/2023 2.49  0.60 - 4.47 Thousands/µL Final   • Monocytes Absolute 08/10/2023 0.85  0.17 - 1.22 Thousand/µL Final   • Eosinophils Absolute 08/10/2023 0.37  0.00 - 0.61 Thousand/µL Final   • Basophils Absolute 08/10/2023 0.07  0.00 - 0.10 Thousands/µL Final   • Sodium 08/10/2023 137  135 - 147 mmol/L Final   • Potassium 08/10/2023 4.3  3.5 - 5.3 mmol/L Final   • Chloride 08/10/2023 104  96 - 108 mmol/L Final   • CO2 08/10/2023 27  21 - 32 mmol/L Final   • ANION GAP 08/10/2023 6  mmol/L Final   • BUN 08/10/2023 14  5 - 25 mg/dL Final   • Creatinine 08/10/2023 1.04  0.60 - 1.30 mg/dL Final   • Glucose 08/10/2023 87  65 - 140 mg/dL Final   • Glucose, Fasting 08/10/2023 87  65 - 99 mg/dL Final   • Calcium 08/10/2023 9.4  8.4 - 10.2 mg/dL Final   • AST 08/10/2023 32  13 - 39 U/L Final   • ALT 08/10/2023 71 (H)  7 - 52 U/L Final   • Alkaline Phosphatase 08/10/2023 73  34 - 104 U/L Final   • Total Protein 08/10/2023 7.7  6.4 - 8.4 g/dL Final   • Albumin 08/10/2023 4.1  3.5 - 5.0 g/dL Final   • Total Bilirubin 08/10/2023 0.45  0.20 - 1.00 mg/dL Final   • eGFR 08/10/2023 98  ml/min/1.73sq m Final   • Lithium Lvl 08/10/2023 0.4 (L)  0.6 - 1.2 mmol/L Final       Mental Status Evaluation:  Appearance:  age appropriate and casually dressed   Behavior:  Calm, cooperative   Speech:  soft and Scant Mood:  dysthymic   Affect:  constricted   Thought Process:  goal directed   Thought Content:     Associations: No delusions voiced    Intact   Perceptual Disturbances: Auditory hallucinations without commands, no visual hallucinations   Risk Potential: Suicidal Ideations none, Homicidal Ideations none and Potential for Aggression No   Sensorium:  person, place and time/date   Cognition:  recent and remote memory grossly intact   Consciousness:  alert and awake    Attention: attention span appeared shorter than expected for age   Insight:  limited   Judgment: limited   Gait/Station: normal gait/station   Motor Activity: no abnormal movements     Progress Toward Goals: Gradually progressing    Assessment/Plan   Principal Problem:    Schizophrenia ([address])  Active Problems:    Medical clearance for psychiatric admission    Tobacco abuse    T wave inversion in EKG      Recommended Treatment:  Lexapro 10 mg daily  Cogentin 0.5 mg twice daily  Haldol 10 mg twice daily  Lithobid 600 mg nightly  Remeron 30 mg nightly  Olanzapine 20 mg nightly  Trazodone 50 mg nightly    On 2 antipsychotics due to failure with monotherapy    Continue with group therapy, milieu therapy and occupational therapy. Risks, benefits and possible side effects of Medications:   Risks, benefits, and possible side effects of medications explained to patient and patient verbalizes understanding. Counseling / Coordination of Care  Total floor / unit time spent today 25 minutes. Greater than 50% of total time was spent with the patient and / or family counseling and / or coordination of care.  A description of the counseling / coordination of care: Medication management, chart review, patient interview

## 2023-08-12 NOTE — TREATMENT TEAM
08/12/23 1100   Team Meeting   Meeting Type Daily Rounds   Team Members Present   Team Members Present Physician;Nurse   Physician Team Member Steven/Chandra   Nursing Team Member Cony   Patient/Family Present   Patient Present No   Patient's Family Present No     AM Rounds:  napping during day, prompted for meals/meds, more visible and social in afternoon/evening. +AH at baseline.

## 2023-08-13 PROCEDURE — 99232 SBSQ HOSP IP/OBS MODERATE 35: CPT | Performed by: NURSE PRACTITIONER

## 2023-08-13 RX ADMIN — NICOTINE POLACRILEX 4 MG: 4 GUM, CHEWING BUCCAL at 18:07

## 2023-08-13 RX ADMIN — BENZTROPINE MESYLATE 0.5 MG: 0.5 TABLET ORAL at 09:01

## 2023-08-13 RX ADMIN — LITHIUM CARBONATE 600 MG: 300 TABLET, EXTENDED RELEASE ORAL at 21:18

## 2023-08-13 RX ADMIN — ESCITALOPRAM OXALATE 10 MG: 10 TABLET ORAL at 09:01

## 2023-08-13 RX ADMIN — HALOPERIDOL 10 MG: 10 TABLET ORAL at 09:01

## 2023-08-13 RX ADMIN — TRAZODONE HYDROCHLORIDE 50 MG: 50 TABLET ORAL at 21:18

## 2023-08-13 RX ADMIN — OLANZAPINE 20 MG: 10 TABLET, FILM COATED ORAL at 21:18

## 2023-08-13 RX ADMIN — HALOPERIDOL 10 MG: 10 TABLET ORAL at 17:23

## 2023-08-13 RX ADMIN — BENZTROPINE MESYLATE 0.5 MG: 0.5 TABLET ORAL at 17:23

## 2023-08-13 RX ADMIN — MIRTAZAPINE 30 MG: 15 TABLET, FILM COATED ORAL at 21:18

## 2023-08-13 NOTE — PROGRESS NOTES
Progress Note - [facility] 33 y.o. male MRN: [id number]   Unit/Bed#: -01 Encounter: [id number]    Behavior over the last 24 hours: unchanged. Deneen Vogel is seen in his room and resting in bed. Has been isolative and napping much of the morning. Reports he continues to experience hallucinations. But states they are decreased in intensity, although frequency remains the same. States voices are threatening him. Reports minimal improvement with current medications. Not able to participate in milieu. Sleep: hypersomnia  Appetite: fair  Medication side effects: No   ROS: no complaints, all other systems are negative    Mental Status Evaluation:    Appearance:  marginal hygiene   Behavior:  guarded   Speech:  slow, scant, soft   Mood:  depressed, dysphoric   Affect:  blunted   Thought Process:  decreased rate of thoughts   Associations: concrete associations   Thought Content:  some paranoia   Perceptual Disturbances: auditory hallucinations   Risk Potential: Suicidal ideation - None at present  Homicidal ideation - None at present  Potential for aggression - No   Sensorium:  oriented to person, place, time/date and situation   Memory:  recent memory grossly intact   Consciousness:  alert and awake   Attention: decreased concentration and decreased attention span   Insight:  poor   Judgment: poor   Gait/Station: normal gait/station, normal balance   Motor Activity: no abnormal movements     Vital signs in last 24 hours:    Temp:  [97.9 °F (36.6 °C)-98.1 °F (36.7 °C)] 97.9 °F (36.6 °C)  HR:  [78-97] 78  Resp:  [18] 18  BP: (136-147)/(89-90) 136/89    Laboratory results: I have personally reviewed all pertinent laboratory/tests results.       Progress Toward Goals: continues to improve    Assessment/Plan   Principal Problem:    Schizophrenia [facility]  Active Problems:    Medical clearance for psychiatric admission    Tobacco abuse    T wave inversion in EKG    Recommended Treatment:     Planned medication and treatment changes: All current active medications have been reviewed  Encourage group therapy, milieu therapy and occupational therapy  Behavioral Health checks every 7 minutes    Requires continued inpatient treatment due to chronic illness and high risk of decompensation if discharged before long term stability is achieved.     Continue current medications:  Current Facility-Administered Medications   Medication Dose Route Frequency Provider Last Rate   • acetaminophen  650 mg Oral Q6H PRN Le Dobbins MD     • acetaminophen  650 mg Oral Q4H PRN Le Dobbins MD     • acetaminophen  975 mg Oral Q6H PRN Le Dobbins MD     • aluminum-magnesium hydroxide-simethicone  30 mL Oral Q4H PRN Le Dobbins MD     • haloperidol lactate  2.5 mg Intramuscular Q4H PRN Max 4/day Le Dobbins MD      And   • LORazepam  1 mg Intramuscular Q4H PRN Max 4/day Le Dobbins MD      And   • benztropine  0.5 mg Intramuscular Q4H PRN Max 4/day Le Dobbins MD     • haloperidol lactate  5 mg Intramuscular Q4H PRN Max 4/day Le Dobbins MD      And   • LORazepam  2 mg Intramuscular Q4H PRN Max 4/day Le Dobbins MD      And   • benztropine  1 mg Intramuscular Q4H PRN Max 4/day Le Dobbins MD     • benztropine  0.5 mg Oral BID Doreen Bragg DO     • benztropine  1 mg Oral Q4H PRN Max 6/day Le Dobbins MD     • bisacodyl  10 mg Rectal Daily PRN Le Dobbins MD     • hydrOXYzine HCL  50 mg Oral Q6H PRN Max 4/day Le Dobbins MD      Or   • diphenhydrAMINE  50 mg Intramuscular Q6H PRN Le Dobbins MD     • escitalopram  10 mg Oral Daily Le Dobbins MD     • haloperidol  1 mg Oral Q6H PRN Le Dobbins MD     • haloperidol  10 mg Oral BID Le Dobbins MD     • haloperidol  2.5 mg Oral Q4H PRN Max 4/day Le Dobbins MD     • haloperidol  5 mg Oral Q4H PRN Max 4/day Le Dobibns MD     • hydrOXYzine HCL  100 mg Oral Q6H PRN Max 4/day Le Dobbins MD      Or   • LORazepam  2 mg Intramuscular Q6H PRN Le Dobbins MD     • hydrOXYzine HCL  25 mg Oral Q6H PRN Max 4/day Le Dobbins MD     • lithium carbonate  600 mg Oral HS Lonnie Adams MD     • mirtazapine  30 mg Oral HS Lonnie Adams MD     • nicotine polacrilex  4 mg Oral Q2H PRN Le Dobbins MD     • OLANZapine  20 mg Oral HS Lonnie Adams MD     • polyethylene glycol  17 g Oral Daily PRN Le Dobbins MD     • senna-docusate sodium  1 tablet Oral Daily PRN Le Dobbins MD     • traZODone  50 mg Oral HS Lonnie Adams MD         Risks / Benefits of Treatment:    Risks, benefits, and possible side effects of medications explained to patient and patient verbalizes understanding and agreement for treatment. Counseling / Coordination of Care:    Patient's progress discussed with staff in treatment team meeting. Medications, treatment progress and treatment plan reviewed with patient.     HOLLI Asencio 08/13/23

## 2023-08-13 NOTE — NURSING NOTE
Patient is visible in the milieu this evening and social with select peers. Reports ongoing hallucinations which are not bothersome. Denies SI and HI. Compliant with scheduled medications. Denies questions or concerns. Staff availability reinforced.

## 2023-08-13 NOTE — PLAN OF CARE
Problem: Alteration in Thoughts and Perception  Goal: Treatment Goal: Gain control of psychotic behaviors/thinking, reduce/eliminate presenting symptoms and demonstrate improved reality functioning upon discharge  Outcome: Progressing  Goal: Verbalize thoughts and feelings  Description: Interventions:  - Promote a nonjudgmental and trusting relationship with the patient through active listening and therapeutic communication  - Assess patient's level of functioning, behavior and potential for risk  - Engage patient in 1 on 1 interactions  - Encourage patient to express fears, feelings, frustrations, and discuss symptoms    - [place] patient to reality, help patient recognize reality-based thinking   - Administer medications as ordered and assess for potential side effects  - Provide the patient education related to the signs and symptoms of the illness and desired effects of prescribed medications  Outcome: Progressing  Goal: Refrain from acting on delusional thinking/internal stimuli  Description: Interventions:  - Monitor patient closely, per order   - Utilize least restrictive measures   - Set reasonable limits, give positive feedback for acceptable   - Administer medications as ordered and monitor of potential side effects  Outcome: Progressing  Goal: Agree to be compliant with medication regime, as prescribed and report medication side effects  Description: Interventions:  - Offer appropriate PRN medication and supervise ingestion; conduct AIMS, as needed   Outcome: Progressing  Goal: Attend and participate in unit activities, including therapeutic, recreational, and educational groups  Description: Interventions:  -Encourage Visitation and family involvement in care  Outcome: Progressing  Goal: Recognize dysfunctional thoughts, communicate reality-based thoughts at the time of discharge  Description: Interventions:  - Provide medication and psycho-education to assist patient in compliance and developing insight into his/her illness   Outcome: Progressing  Goal: Complete daily ADLs, including personal hygiene independently, as able  Description: Interventions:  - Observe, teach, and assist patient with ADLS  - Monitor and promote a balance of rest/activity, with adequate nutrition and elimination   Outcome: Progressing     Problem: Ineffective Coping  Goal: Identifies ineffective coping skills  Outcome: Progressing  Goal: Identifies healthy coping skills  Outcome: Progressing  Goal: Demonstrates healthy coping skills  Outcome: Progressing  Goal: Participates in unit activities  Description: Interventions:  - Provide therapeutic environment   - Provide required programming   - Redirect inappropriate behaviors   Outcome: Progressing  Goal: Patient/Family participate in treatment and DC plans  Description: Interventions:  - Provide therapeutic environment  Outcome: Progressing  Goal: Patient/Family verbalizes awareness of resources  Outcome: Progressing  Goal: Understands least restrictive measures  Description: Interventions:  - Utilize least restrictive behavior  Outcome: Progressing  Goal: Free from restraint events  Description: - Utilize least restrictive measures   - Provide behavioral interventions   - Redirect inappropriate behaviors   Outcome: Progressing     Problem: Alteration in Orientation  Goal: Treatment Goal: Demonstrate a reduction of confusion and improved orientation to person, place, time and/or situation upon discharge, according to optimum baseline/ability  Outcome: Progressing  Goal: Interact with staff daily  Description: Interventions:  - Assess and re-assess patient's level of orientation  - Engage patient in 1 on 1 interactions, daily, for a minimum of 15 minutes   - Establish rapport/trust with patient   Outcome: Progressing  Goal: Express concerns related to confused thinking related to:  Description: Interventions:  - Encourage patient to express feelings, fears, frustrations, hopes  - Assign consistent caregivers   - [place]/re-orient patient as needed  - Allow comfort items, as appropriate  - Provide visual cues, signs, etc.   Outcome: Progressing  Goal: Allow medical examinations, as recommended  Description: Interventions:  - Provide physical/neurological exams and/or referrals, per provider   Outcome: Progressing  Goal: Cooperate with recommended testing/procedures  Description: Interventions:  - Determine need for ancillary testing  - Observe for mental status changes  - Implement falls/precaution protocol   Outcome: Progressing  Goal: Attend and participate in unit activities, including therapeutic, recreational, and educational groups  Description: Interventions:  - Provide therapeutic and educational activities daily, encourage attendance and participation, and document same in the medical record   - Provide appropriate opportunities for reminiscence   - Provide a consistent daily routine   - Encourage family contact/visitation   Outcome: Progressing  Goal: Complete daily ADLs, including personal hygiene independently, as able  Description: Interventions:  - Observe, teach, and assist patient with ADLS  Outcome: Progressing     Problem: DISCHARGE PLANNING - CARE MANAGEMENT  Goal: Discharge to post-acute care or home with appropriate resources  Description: INTERVENTIONS:  - Conduct assessment to determine patient/family and health care team treatment goals, and need for post-acute services based on payer coverage, community resources, and patient preferences, and barriers to discharge  - Address psychosocial, clinical, and financial barriers to discharge as identified in assessment in conjunction with the patient/family and health care team  - Arrange appropriate level of post-acute services according to patient’s   needs and preference and payer coverage in collaboration with the physician and health care team  - Communicate with and update the patient/family, physician, and health care team regarding progress on the discharge plan  - Arrange appropriate transportation to post-acute venues  Outcome: Progressing

## 2023-08-13 NOTE — PLAN OF CARE
Problem: Alteration in Thoughts and Perception  Goal: Treatment Goal: Gain control of psychotic behaviors/thinking, reduce/eliminate presenting symptoms and demonstrate improved reality functioning upon discharge  Outcome: Progressing  Goal: Verbalize thoughts and feelings  Description: Interventions:  - Promote a nonjudgmental and trusting relationship with the patient through active listening and therapeutic communication  - Assess patient's level of functioning, behavior and potential for risk  - Engage patient in 1 on 1 interactions  - Encourage patient to express fears, feelings, frustrations, and discuss symptoms    - [place] patient to reality, help patient recognize reality-based thinking   - Administer medications as ordered and assess for potential side effects  - Provide the patient education related to the signs and symptoms of the illness and desired effects of prescribed medications  Outcome: Progressing  Goal: Refrain from acting on delusional thinking/internal stimuli  Description: Interventions:  - Monitor patient closely, per order   - Utilize least restrictive measures   - Set reasonable limits, give positive feedback for acceptable   - Administer medications as ordered and monitor of potential side effects  Outcome: Progressing  Goal: Agree to be compliant with medication regime, as prescribed and report medication side effects  Description: Interventions:  - Offer appropriate PRN medication and supervise ingestion; conduct AIMS, as needed   Outcome: Progressing  Goal: Attend and participate in unit activities, including therapeutic, recreational, and educational groups  Description: Interventions:  -Encourage Visitation and family involvement in care  Outcome: Progressing  Goal: Recognize dysfunctional thoughts, communicate reality-based thoughts at the time of discharge  Description: Interventions:  - Provide medication and psycho-education to assist patient in compliance and developing insight into his/her illness   Outcome: Progressing  Goal: Complete daily ADLs, including personal hygiene independently, as able  Description: Interventions:  - Observe, teach, and assist patient with ADLS  - Monitor and promote a balance of rest/activity, with adequate nutrition and elimination   Outcome: Progressing     Problem: Ineffective Coping  Goal: Identifies ineffective coping skills  Outcome: Progressing  Goal: Identifies healthy coping skills  Outcome: Progressing  Goal: Demonstrates healthy coping skills  Outcome: Progressing  Goal: Participates in unit activities  Description: Interventions:  - Provide therapeutic environment   - Provide required programming   - Redirect inappropriate behaviors   Outcome: Progressing  Goal: Patient/Family participate in treatment and DC plans  Description: Interventions:  - Provide therapeutic environment  Outcome: Progressing  Goal: Patient/Family verbalizes awareness of resources  Outcome: Progressing  Goal: Understands least restrictive measures  Description: Interventions:  - Utilize least restrictive behavior  Outcome: Progressing  Goal: Free from restraint events  Description: - Utilize least restrictive measures   - Provide behavioral interventions   - Redirect inappropriate behaviors   Outcome: Progressing     Problem: Alteration in Orientation  Goal: Treatment Goal: Demonstrate a reduction of confusion and improved orientation to person, place, time and/or situation upon discharge, according to optimum baseline/ability  Outcome: Progressing  Goal: Interact with staff daily  Description: Interventions:  - Assess and re-assess patient's level of orientation  - Engage patient in 1 on 1 interactions, daily, for a minimum of 15 minutes   - Establish rapport/trust with patient   Outcome: Progressing  Goal: Express concerns related to confused thinking related to:  Description: Interventions:  - Encourage patient to express feelings, fears, frustrations, hopes  - Assign consistent caregivers   - [place]/re-orient patient as needed  - Allow comfort items, as appropriate  - Provide visual cues, signs, etc.   Outcome: Progressing  Goal: Allow medical examinations, as recommended  Description: Interventions:  - Provide physical/neurological exams and/or referrals, per provider   Outcome: Progressing  Goal: Cooperate with recommended testing/procedures  Description: Interventions:  - Determine need for ancillary testing  - Observe for mental status changes  - Implement falls/precaution protocol   Outcome: Progressing  Goal: Attend and participate in unit activities, including therapeutic, recreational, and educational groups  Description: Interventions:  - Provide therapeutic and educational activities daily, encourage attendance and participation, and document same in the medical record   - Provide appropriate opportunities for reminiscence   - Provide a consistent daily routine   - Encourage family contact/visitation   Outcome: Progressing  Goal: Complete daily ADLs, including personal hygiene independently, as able  Description: Interventions:  - Observe, teach, and assist patient with ADLS  Outcome: Progressing

## 2023-08-13 NOTE — NURSING NOTE
Pt was seen in the activity room watching TV. States that he feels good and denies anxiety or depression. Minimal in conversation. Social with peers. Denies SI, HI, and AVH.

## 2023-08-13 NOTE — NURSING NOTE
Patient approached nurses station for morning medication. Was out for breakfast today. He denies SI/HI and reports hallucinations are not bothering him. Reports sleeping fine last night.

## 2023-08-14 PROCEDURE — 99232 SBSQ HOSP IP/OBS MODERATE 35: CPT | Performed by: STUDENT IN AN ORGANIZED HEALTH CARE EDUCATION/TRAINING PROGRAM

## 2023-08-14 PROCEDURE — 80178 ASSAY OF LITHIUM: CPT | Performed by: STUDENT IN AN ORGANIZED HEALTH CARE EDUCATION/TRAINING PROGRAM

## 2023-08-14 RX ADMIN — NICOTINE POLACRILEX 4 MG: 4 GUM, CHEWING BUCCAL at 17:35

## 2023-08-14 RX ADMIN — BENZTROPINE MESYLATE 0.5 MG: 0.5 TABLET ORAL at 17:34

## 2023-08-14 RX ADMIN — MIRTAZAPINE 30 MG: 15 TABLET, FILM COATED ORAL at 21:12

## 2023-08-14 RX ADMIN — LITHIUM CARBONATE 600 MG: 300 TABLET, EXTENDED RELEASE ORAL at 21:12

## 2023-08-14 RX ADMIN — BENZTROPINE MESYLATE 0.5 MG: 0.5 TABLET ORAL at 09:05

## 2023-08-14 RX ADMIN — OLANZAPINE 20 MG: 10 TABLET, FILM COATED ORAL at 21:12

## 2023-08-14 RX ADMIN — TRAZODONE HYDROCHLORIDE 50 MG: 50 TABLET ORAL at 21:12

## 2023-08-14 RX ADMIN — HALOPERIDOL 10 MG: 10 TABLET ORAL at 09:05

## 2023-08-14 RX ADMIN — HALOPERIDOL 10 MG: 10 TABLET ORAL at 17:34

## 2023-08-14 RX ADMIN — ESCITALOPRAM OXALATE 10 MG: 10 TABLET ORAL at 09:05

## 2023-08-14 NOTE — PROGRESS NOTES
Progress Note - [facility] 33 y.o. male MRN: [id number]  Unit/Bed#: U 216-01 Encounter: [id number]    Assessment/Plan   Principal Problem:    Schizophrenia [facility])  Active Problems:    Medical clearance for psychiatric admission    Tobacco abuse    T wave inversion in EKG      Subjective: Patient was seen, chart was reviewed, and case was discussed with the team. Patient appears calm, cooperative and withdrawn. Reports improvement in mood. Voices have diminished and manageable. Sleep and appetite are good. Hge is compliant with medications.  Denies any side effects  Behavior over the last 24 hours:  unchanged  Sleep: normal  Appetite: normal  Medication side effects: No    Medical ROS: Pertinent items are noted in HPI.all other systems are negative    Current Medications:  Current Facility-Administered Medications   Medication Dose Route Frequency   • acetaminophen (TYLENOL) tablet 650 mg  650 mg Oral Q6H PRN   • acetaminophen (TYLENOL) tablet 650 mg  650 mg Oral Q4H PRN   • acetaminophen (TYLENOL) tablet 975 mg  975 mg Oral Q6H PRN   • aluminum-magnesium hydroxide-simethicone (MAALOX) oral suspension 30 mL  30 mL Oral Q4H PRN   • haloperidol lactate (HALDOL) injection 2.5 mg  2.5 mg Intramuscular Q4H PRN Max 4/day    And   • LORazepam (ATIVAN) injection 1 mg  1 mg Intramuscular Q4H PRN Max 4/day    And   • benztropine (COGENTIN) injection 0.5 mg  0.5 mg Intramuscular Q4H PRN Max 4/day   • haloperidol lactate (HALDOL) injection 5 mg  5 mg Intramuscular Q4H PRN Max 4/day    And   • LORazepam (ATIVAN) injection 2 mg  2 mg Intramuscular Q4H PRN Max 4/day    And   • benztropine (COGENTIN) injection 1 mg  1 mg Intramuscular Q4H PRN Max 4/day   • benztropine (COGENTIN) tablet 0.5 mg  0.5 mg Oral BID   • benztropine (COGENTIN) tablet 1 mg  1 mg Oral Q4H PRN Max 6/day   • bisacodyl (DULCOLAX) rectal suppository 10 mg  10 mg Rectal Daily PRN   • hydrOXYzine HCL (ATARAX) tablet 50 mg  50 mg Oral Q6H PRN Max 4/day    Or   • diphenhydrAMINE (BENADRYL) injection 50 mg  50 mg Intramuscular Q6H PRN   • escitalopram (LEXAPRO) tablet 10 mg  10 mg Oral Daily   • haloperidol (HALDOL) tablet 1 mg  1 mg Oral Q6H PRN   • haloperidol (HALDOL) tablet 10 mg  10 mg Oral BID   • haloperidol (HALDOL) tablet 2.5 mg  2.5 mg Oral Q4H PRN Max 4/day   • haloperidol (HALDOL) tablet 5 mg  5 mg Oral Q4H PRN Max 4/day   • hydrOXYzine HCL (ATARAX) tablet 100 mg  100 mg Oral Q6H PRN Max 4/day    Or   • LORazepam (ATIVAN) injection 2 mg  2 mg Intramuscular Q6H PRN   • hydrOXYzine HCL (ATARAX) tablet 25 mg  25 mg Oral Q6H PRN Max 4/day   • lithium carbonate (LITHOBID) CR tablet 600 mg  600 mg Oral HS   • mirtazapine (REMERON) tablet 30 mg  30 mg Oral HS   • nicotine polacrilex (NICORETTE) gum 4 mg  4 mg Oral Q2H PRN   • OLANZapine (ZyPREXA) tablet 20 mg  20 mg Oral HS   • polyethylene glycol (MIRALAX) packet 17 g  17 g Oral Daily PRN   • senna-docusate sodium (SENOKOT S) 8.6-50 mg per tablet 1 tablet  1 tablet Oral Daily PRN   • traZODone (DESYREL) tablet 50 mg  50 mg Oral HS       Behavioral Health Medications:   all current active meds have been reviewed. Vitals:  Vitals:    08/14/23 0932   BP:    Pulse:    Resp:    Temp: (P) 98.3 °F (36.8 °C)   SpO2:        Laboratory results:    I have personally reviewed all pertinent laboratory/tests results. Mental Status Evaluation:    Appearance:  age appropriate   Behavior:  cooperative   Speech:  normal pitch and normal volume   Mood:  "ok"   Affect:  constricted and mood-congruent   Thought Process:  goal directed and logical   Thought Content:  normal   Perceptual Disturbances:  Auditory hallucinations without commands   Risk Potential: Suicidal Ideations none  Homicidal Ideations none  Potential for Aggression No   Sensorium:  person, place and time/date   Memory:  recent and remote memory grossly intact   Consciousness:  alert and awake    Attention: attention span appeared shorter than expected for age   Insight:  fair   Judgment: fair   Gait/Station: normal gait/station   Motor Activity: no abnormal movements       Progress Toward Goals: Progressing    Recommended Treatment: Continue with pharmacotherapy, group therapy, milieu therapy and occupational therapy. 1.Discahrge planning    Risks, benefits and possible side effects of Medications:   Risks, benefits, alternatives, and possible side effects of patient's psychiatric medications were discussed with patient.

## 2023-08-14 NOTE — NURSING NOTE
Patient has been napping in his room. Declined breakfast. Reports sleeping well last night. Denies SI/HI and hallucinations at this time.

## 2023-08-15 VITALS
WEIGHT: 226.8 LBS | RESPIRATION RATE: 16 BRPM | BODY MASS INDEX: 35.6 KG/M2 | HEIGHT: 67 IN | HEART RATE: 72 BPM | OXYGEN SATURATION: 98 % | SYSTOLIC BLOOD PRESSURE: 121 MMHG | DIASTOLIC BLOOD PRESSURE: 70 MMHG | TEMPERATURE: 98.1 F

## 2023-08-15 LAB — LITHIUM SERPL-SCNC: 0.5 MMOL/L (ref 0.6–1.2)

## 2023-08-15 PROCEDURE — 99239 HOSP IP/OBS DSCHRG MGMT >30: CPT | Performed by: STUDENT IN AN ORGANIZED HEALTH CARE EDUCATION/TRAINING PROGRAM

## 2023-08-15 RX ORDER — ESCITALOPRAM OXALATE 10 MG/1
10 TABLET ORAL DAILY
Qty: 30 TABLET | Refills: 0 | Status: SHIPPED | OUTPATIENT
Start: 2023-08-15 | End: 2023-09-14

## 2023-08-15 RX ORDER — BENZTROPINE MESYLATE 0.5 MG/1
0.5 TABLET ORAL 2 TIMES DAILY
Qty: 60 TABLET | Refills: 0 | Status: SHIPPED | OUTPATIENT
Start: 2023-08-15

## 2023-08-15 RX ORDER — LITHIUM CARBONATE 300 MG/1
600 TABLET, FILM COATED, EXTENDED RELEASE ORAL
Qty: 60 TABLET | Refills: 0 | Status: SHIPPED | OUTPATIENT
Start: 2023-08-15

## 2023-08-15 RX ORDER — TRAZODONE HYDROCHLORIDE 50 MG/1
50 TABLET ORAL
Qty: 30 TABLET | Refills: 0 | Status: SHIPPED | OUTPATIENT
Start: 2023-08-15

## 2023-08-15 RX ORDER — MIRTAZAPINE 30 MG/1
30 TABLET, FILM COATED ORAL
Qty: 30 TABLET | Refills: 0 | Status: SHIPPED | OUTPATIENT
Start: 2023-08-15

## 2023-08-15 RX ORDER — OLANZAPINE 20 MG/1
20 TABLET ORAL
Qty: 30 TABLET | Refills: 0 | Status: SHIPPED | OUTPATIENT
Start: 2023-08-15

## 2023-08-15 RX ORDER — HALOPERIDOL 10 MG/1
10 TABLET ORAL 2 TIMES DAILY
Qty: 60 TABLET | Refills: 0 | Status: SHIPPED | OUTPATIENT
Start: 2023-08-15 | End: 2023-09-14

## 2023-08-15 RX ADMIN — NICOTINE POLACRILEX 4 MG: 4 GUM, CHEWING BUCCAL at 11:49

## 2023-08-15 RX ADMIN — ESCITALOPRAM OXALATE 10 MG: 10 TABLET ORAL at 09:17

## 2023-08-15 RX ADMIN — HALOPERIDOL 10 MG: 10 TABLET ORAL at 09:17

## 2023-08-15 RX ADMIN — BENZTROPINE MESYLATE 0.5 MG: 0.5 TABLET ORAL at 09:17

## 2023-08-15 NOTE — DISCHARGE INSTR - OTHER ORDERS
24/7 [address] [facility], James Bright;  Call: [address] [place] Free:  [facility]: [phone]    The [place] is for persons from [place], [place] and [place]  Phone: [phone]  [facility]:  [phone]  *Alcohol Anonymous: 866.828.5495  *[place] [facility] [employer] [facility]: (868) 813-4064  [address] [place] [address] ([facility]) [employer]: 186.560.7502/Website: www.juan[employer]  *Substance Abuse and [address] Administration([facility]) [employer], which is a confidential, free, 24-hour-a-day, 365-day-a-year, information service for individuals and family members facing mental health and/or substance use disorders. This service provides referrals to local treatment facilities, support groups, and community-based organizations. Callers can also order free publications and other information. Call 7-419.973.9348/Website: www.[facility].gov  *[facility] 2-1-1: This is a toll free, confidential, 24-hour-a-day service which connects you to a community  in your area who can help you find services and resources that are available to you locally and provide critical services that can improve and save lives.   Call: 211  /Website: https://donaldBiaking.net/

## 2023-08-15 NOTE — DISCHARGE INSTR - APPOINTMENTS
You will be discharged to [address] [place], [address] [facility]   You confirmed that your cell phone number is: [phone]        Ju Henderson or Mary Jo, our [address], will be calling you after your discharge, on the phone number that you provided. They will be available as an additional support, if needed. If you wish to speak with one of them, you may contact [facility] at [phone] or [employer] at [phone].

## 2023-08-15 NOTE — CASE MANAGEMENT
CM called [facility] ([phone]) and spoke to arsener Adry Orta and CM made referral for pt to be scheduled for intake for [facility] OP services with [facility]. They will assist pt in applying for MA and set him up for [facility] OP treatment as well as [facility] services until his [place] Medicaid is active. Pt is scheduled for an in person intake on Friday 8/18/23 at 10am with Natalie Cervantes.  [facility], [address] [place] [address]

## 2023-08-15 NOTE — NURSING NOTE
Tami Gould is calm upon approach, visualized resting in bed and napping intermittently. Patient is briefly social with his room-mate, denies current SI/HI, reports feeling good. Alberto reports he is scheduled for discharge tomorrow, reports mild anxiety r/t transition. Patient remains compliant with medications, is out of bed for meal time and was encouraged to attend groups and to seek staff with any issues and/or concerns, verbalized understanding.

## 2023-08-15 NOTE — PLAN OF CARE
Problem: Alteration in Thoughts and Perception  Goal: Treatment Goal: Gain control of psychotic behaviors/thinking, reduce/eliminate presenting symptoms and demonstrate improved reality functioning upon discharge  Outcome: Progressing  Goal: Verbalize thoughts and feelings  Description: Interventions:  - Promote a nonjudgmental and trusting relationship with the patient through active listening and therapeutic communication  - Assess patient's level of functioning, behavior and potential for risk  - Engage patient in 1 on 1 interactions  - Encourage patient to express fears, feelings, frustrations, and discuss symptoms    - [place] patient to reality, help patient recognize reality-based thinking   - Administer medications as ordered and assess for potential side effects  - Provide the patient education related to the signs and symptoms of the illness and desired effects of prescribed medications  Outcome: Progressing  Goal: Refrain from acting on delusional thinking/internal stimuli  Description: Interventions:  - Monitor patient closely, per order   - Utilize least restrictive measures   - Set reasonable limits, give positive feedback for acceptable   - Administer medications as ordered and monitor of potential side effects  Outcome: Progressing  Goal: Agree to be compliant with medication regime, as prescribed and report medication side effects  Description: Interventions:  - Offer appropriate PRN medication and supervise ingestion; conduct AIMS, as needed   Outcome: Progressing  Goal: Attend and participate in unit activities, including therapeutic, recreational, and educational groups  Description: Interventions:  -Encourage Visitation and family involvement in care  Outcome: Progressing  Goal: Recognize dysfunctional thoughts, communicate reality-based thoughts at the time of discharge  Description: Interventions:  - Provide medication and psycho-education to assist patient in compliance and developing insight into his/her illness   Outcome: Progressing  Goal: Complete daily ADLs, including personal hygiene independently, as able  Description: Interventions:  - Observe, teach, and assist patient with ADLS  - Monitor and promote a balance of rest/activity, with adequate nutrition and elimination   Outcome: Progressing     Problem: Ineffective Coping  Goal: Identifies ineffective coping skills  Outcome: Progressing  Goal: Identifies healthy coping skills  Outcome: Progressing  Goal: Demonstrates healthy coping skills  Outcome: Progressing  Goal: Participates in unit activities  Description: Interventions:  - Provide therapeutic environment   - Provide required programming   - Redirect inappropriate behaviors   Outcome: Progressing  Goal: Patient/Family participate in treatment and DC plans  Description: Interventions:  - Provide therapeutic environment  Outcome: Progressing  Goal: Patient/Family verbalizes awareness of resources  Outcome: Progressing  Goal: Understands least restrictive measures  Description: Interventions:  - Utilize least restrictive behavior  Outcome: Progressing  Goal: Free from restraint events  Description: - Utilize least restrictive measures   - Provide behavioral interventions   - Redirect inappropriate behaviors   Outcome: Progressing     Problem: Alteration in Orientation  Goal: Treatment Goal: Demonstrate a reduction of confusion and improved orientation to person, place, time and/or situation upon discharge, according to optimum baseline/ability  Outcome: Progressing  Goal: Interact with staff daily  Description: Interventions:  - Assess and re-assess patient's level of orientation  - Engage patient in 1 on 1 interactions, daily, for a minimum of 15 minutes   - Establish rapport/trust with patient   Outcome: Progressing  Goal: Express concerns related to confused thinking related to:  Description: Interventions:  - Encourage patient to express feelings, fears, frustrations, hopes  - Assign consistent caregivers   - [place]/re-orient patient as needed  - Allow comfort items, as appropriate  - Provide visual cues, signs, etc.   Outcome: Progressing  Goal: Allow medical examinations, as recommended  Description: Interventions:  - Provide physical/neurological exams and/or referrals, per provider   Outcome: Progressing  Goal: Cooperate with recommended testing/procedures  Description: Interventions:  - Determine need for ancillary testing  - Observe for mental status changes  - Implement falls/precaution protocol   Outcome: Progressing  Goal: Attend and participate in unit activities, including therapeutic, recreational, and educational groups  Description: Interventions:  - Provide therapeutic and educational activities daily, encourage attendance and participation, and document same in the medical record   - Provide appropriate opportunities for reminiscence   - Provide a consistent daily routine   - Encourage family contact/visitation   Outcome: Progressing  Goal: Complete daily ADLs, including personal hygiene independently, as able  Description: Interventions:  - Observe, teach, and assist patient with ADLS  Outcome: Progressing

## 2023-08-15 NOTE — BH TRANSITION RECORD
Contact Information: If you have any questions, concerns, pended studies, tests and/or procedures, or emergencies regarding your inpatient behavioral health visit. Please contact [facility] [facility] unit [phone] and ask to speak to a , nurse or physician. A contact is available 24 hours/ 7 days a week at this number. Summary of Procedures Performed During your Stay:  Below is a list of major procedures performed during your hospital stay and a summary of results:  - No major procedures performed. Pending Studies (From admission, onward)    None        Please follow up on the above pending studies with your PCP and/or referring provider.

## 2023-08-15 NOTE — CASE MANAGEMENT
CM sent transport request to [facility] for pt dc today at 11:15am. CM was informed that Francis Floss can pick pt up around 11:45am today.

## 2023-08-15 NOTE — DISCHARGE SUMMARY
Discharge Summary [facility] 33 y.o. male MRN: [id number]  Unit/Bed#: -01 Encounter: [id number]     Admission Date:   Admission Orders (From admission, onward)     Ordered        07/11/23 2206  ED TO DIFFERENT CAMPUS [address] UN[address] or INPATIENT MEDICAL UNIT to [facility] (using Discharge Readmit Navigator) - Admit Patient to [facility] Unit  Once                            Discharge Date: No discharge date for patient encounter. Attending Psychiatrist: Lalitha Hill*    Reason for Admission/HPI:   History of Present Illness   As per H&P on 7/12:"Per ED provider on 910:"27 year-old male with a history of schizophrenia presents with a chief complaint of increasing auditory hallucinations after recent discharge from [facility]. Patient denies any command hallucinations but states the voices have been threatening to harm him. Patient denies any thoughts of self harm or harm to others. Patient states he has been compliant with his medications and denies any illicit substance use. Impression and plan: Differently with auditory hallucinations.  Patient may benefit from inpatient psychiatric admission for involuntary admission though I do not believe patient meets criteria for involuntary admission to the hospital. Mattie Post will have crisis evaluate patient, monitor patient, reassess.  I have ordered patient's psychiatric medications that I encouraged him to take."     Per Crisis worker on 7/11:"Pt presents to the ED as a self referral from home. Pt is calm, cooperative and maintains good eye contact. Pt confirms having been recently discharged from [facility] on 7/7/23. Pt denies SI's / HI's and or visual hallucinations. Pt reports experiencing non-commanding auditory hallucinations. Pt states, "I felt good when I left the hospital and they did not start again until two days later." Pt reports being compliant w/medications.  Pt reports OP therapy appointment is too far from his home and he needs to call a psychiatrist to schedule an appointment. Pt denies any issues w/sleep and or appetite at this time. Pt denies hx of SA'a and SIB's. Pt reports having [place] and recently applied for [place] [facility]. Pt denies any medical issues, legal issues, use of illegal substances and or ETOH. Pt reports smoking aprox 5 cigarettes daily. CW and pt discussed pt participate in telepsych consult to inquire on medication adjustments if possible. Pt does report baseline symptoms of auditory hallucinations for quite some amount of time.   CW sent TT to Dr. Tatiana Cooney requesting an order for telepsych consult. MARLENY, TRISTON"     This is 32 yo male with hx of Schizophrenia admitted to inpatient unit on voluntary status for worsening command voices to hurt self. Patient was discharged form this unit on 7/7, felt better for 2 days and then the voices have returned. Patient also endorses depressed mood, anhedonia, lack of energy, motivation, tiredness, poor sleep and anxiety. He also endorses paranoia and command voices to hurt self. Denies any intent or plan. Patient was hospitalized 6 times in the past year. Denies any stressors at home. Reports compliance with medications. Denies any drug use, smokes 4-5 cigarettes. Psychiatric Review Of Systems:  Medication side effects: none  Sleep: Poor  Appetite: no change  Hygiene: able to tend to instrumental and basic ADLs  Anxiety: Yes  Psychotic Symptoms: Yes  Depression Symptoms: Yes  Manic Symptoms: denies  PTSD Symptoms: denies  Suicidal Thoughts: denies  Homicidal Thoughts: denies     Medical Review Of Systems:   Complete ROS is negative, except as noted above."    Hospital Course:   Patient was admitted to the inpatient psychiatric unit and started on Behavioral Health checks every 7 minutes. During the hospitalization patient was encouraged to attend individual therapy, group therapy, milieu therapy and occupational therapy.      Psychiatric medications were restarted during the hospital stay. To address mood symptoms and psychosis, patient was treated with antidepressant Lexapro, Haldol, lithium, Remeron, Zyprexa, trazodone and Cogentin. Prior to beginning of treatment medications risks and benefits and possible side effects were reviewed. Patient verbalized understanding and agreement for treatment. Upon admission patient was seen by medical service for medical clearance for inpatient treatment and medical follow up. Patient’s symptoms resolved over the hospital course With adjustment of medications to Lexapro 10 mg daily, Haldol 10 mg BID, lithium 600 mg HS, Remeron 30 mg HS, Zyprexa 20 mg PO HS, trazodone 50 mg Po HS, Cogentin 0.5 mg BID and therapeutic milieu, his symptoms were diminished. Lithium level is 0.5 on 8/14. He continues to have some residual voices which are manageable with music and able to ignore them. Patient appears to be at his baseline at this time. At the end of treatment patient was improved and mood was more stable at the time of discharge. Patient denied suicidal ideation, intent or plan at the time of discharge and denied homicidal ideation, intent or plan at the time of discharge. There was no overt psychosis at the time of discharge. Patient was participating appropriately in milieu at the time of discharge. Behavior was appropriate on the unit at the time of discharge. Sleep and appetite were improved. Patient was tolerating medications and was not reporting any significant side effects at the time of discharge. Since patient was doing well at the end of the hospitalization, treatment team felt that he could be safely discharged to outpatient care. Patient also felt stable and ready for discharge at the end of the hospital stay.      Mental Status at time of Discharge:     Appearance:  age appropriate   Behavior:  cooperative   Speech:  normal pitch and normal volume   Mood:  "ok"   Affect:  mood-congruent   Thought Process:  goal directed and logical   Associations: intact associations   Thought Content:  normal   Perceptual Disturbances: Residual voices    Risk Potential: Suicidal Ideations none, Homicidal Ideations none and Potential for Aggression No   Sensorium:  person, place and time/date   Cognition:  recent and remote memory grossly intact   Consciousness:  alert and awake    Attention: attention span appeared shorter than expected for age   Insight:  limited   Judgment: limited   Gait/Station: normal gait/station   Motor Activity: no abnormal movements       Discharge Diagnosis:   Schizophrenia    Medical Problems     Resolved Problems  Date Reviewed: 8/15/2023   None             Discharge Medications:  See after visit summary for reconciled discharge medications provided to patient and family. Discharge instructions/Information to patient and family:   See after visit summary for information provided to patient and family. Provisions for Follow-Up Care:  See after visit summary for information related to follow-up care and any pertinent home health orders. Discharge Statement   I spent 45 minutes discharging the patient. This time was spent on the day of discharge. I had direct contact with the patient on the day of discharge. Additional documentation is required if more than 30 minutes were spent on discharge.

## 2023-08-15 NOTE — NURSING NOTE
Pt resting in bed this morning. Completed ADLs in preparation for discharge today. Pt reports feeling nervous but does have support as pt lives with family. AVS reviewed and prescriptions have been e-prescribed. Pt expresses understanding, denies any further questions. Pt discharged from unit via [facility].

## 2023-08-16 NOTE — CASE MANAGEMENT
8/16/23 9:59am     CM sent pt dc summary to Billy at [address] [place] [address] ([email or web address]) for her records as pt is scheduled for intake for [facility] OP services Friday 8/18/23 at 10am.     CM explained pt situation and asked if she can complete phone intake with him on Friday as he is not sure he will have transportation. CM explained pt insurance situation and that family has applied for [place] medicaid as well as PATHS.      CM faxed ICM referral to [address] [place] [address] (fax# [phone])

## 2023-11-14 ENCOUNTER — HOSPITAL ENCOUNTER (INPATIENT)
Facility: HOSPITAL | Age: 28
LOS: 136 days | DRG: 750 | End: 2024-03-29
Attending: PSYCHIATRY & NEUROLOGY | Admitting: PSYCHIATRY & NEUROLOGY
Payer: COMMERCIAL

## 2023-11-14 ENCOUNTER — HOSPITAL ENCOUNTER (EMERGENCY)
Facility: HOSPITAL | Age: 28
End: 2023-11-14
Attending: EMERGENCY MEDICINE

## 2023-11-14 VITALS
BODY MASS INDEX: 35.47 KG/M2 | HEIGHT: 67 IN | DIASTOLIC BLOOD PRESSURE: 78 MMHG | RESPIRATION RATE: 20 BRPM | TEMPERATURE: 98.4 F | HEART RATE: 74 BPM | SYSTOLIC BLOOD PRESSURE: 121 MMHG | OXYGEN SATURATION: 99 % | WEIGHT: 226 LBS

## 2023-11-14 DIAGNOSIS — F29 PSYCHOSIS (HCC): Primary | ICD-10-CM

## 2023-11-14 DIAGNOSIS — F29 PSYCHOSIS (HCC): ICD-10-CM

## 2023-11-14 DIAGNOSIS — F25.0 SCHIZOAFFECTIVE DISORDER, BIPOLAR TYPE (HCC): Primary | Chronic | ICD-10-CM

## 2023-11-14 DIAGNOSIS — R94.31 ABNORMAL FINDING ON EKG: ICD-10-CM

## 2023-11-14 LAB
AMPHETAMINES SERPL QL SCN: NEGATIVE
BARBITURATES UR QL: NEGATIVE
BENZODIAZ UR QL: NEGATIVE
COCAINE UR QL: NEGATIVE
ETHANOL EXG-MCNC: 0 MG/DL
FLUAV RNA RESP QL NAA+PROBE: NEGATIVE
FLUBV RNA RESP QL NAA+PROBE: NEGATIVE
METHADONE UR QL: NEGATIVE
OPIATES UR QL SCN: NEGATIVE
OXYCODONE+OXYMORPHONE UR QL SCN: NEGATIVE
PCP UR QL: NEGATIVE
RSV RNA RESP QL NAA+PROBE: NEGATIVE
SARS-COV-2 RNA RESP QL NAA+PROBE: NEGATIVE
THC UR QL: NEGATIVE

## 2023-11-14 PROCEDURE — 82075 ASSAY OF BREATH ETHANOL: CPT | Performed by: EMERGENCY MEDICINE

## 2023-11-14 PROCEDURE — 80307 DRUG TEST PRSMV CHEM ANLYZR: CPT | Performed by: EMERGENCY MEDICINE

## 2023-11-14 PROCEDURE — 99285 EMERGENCY DEPT VISIT HI MDM: CPT

## 2023-11-14 PROCEDURE — 0241U HB NFCT DS VIR RESP RNA 4 TRGT: CPT | Performed by: EMERGENCY MEDICINE

## 2023-11-14 RX ORDER — BENZTROPINE MESYLATE 1 MG/1
1 TABLET ORAL
Status: CANCELLED | OUTPATIENT
Start: 2023-11-14

## 2023-11-14 RX ORDER — ACETAMINOPHEN 325 MG/1
650 TABLET ORAL EVERY 6 HOURS PRN
Status: CANCELLED | OUTPATIENT
Start: 2023-11-14

## 2023-11-14 RX ORDER — MAGNESIUM HYDROXIDE/ALUMINUM HYDROXICE/SIMETHICONE 120; 1200; 1200 MG/30ML; MG/30ML; MG/30ML
30 SUSPENSION ORAL EVERY 4 HOURS PRN
Status: CANCELLED | OUTPATIENT
Start: 2023-11-14

## 2023-11-14 RX ORDER — HYDROXYZINE 50 MG/1
50 TABLET, FILM COATED ORAL
Status: DISCONTINUED | OUTPATIENT
Start: 2023-11-14 | End: 2024-03-29 | Stop reason: HOSPADM

## 2023-11-14 RX ORDER — DIPHENHYDRAMINE HYDROCHLORIDE 50 MG/ML
50 INJECTION INTRAMUSCULAR; INTRAVENOUS EVERY 6 HOURS PRN
Status: CANCELLED | OUTPATIENT
Start: 2023-11-14

## 2023-11-14 RX ORDER — HALOPERIDOL 1 MG/1
1 TABLET ORAL EVERY 6 HOURS PRN
Status: CANCELLED | OUTPATIENT
Start: 2023-11-14

## 2023-11-14 RX ORDER — LORAZEPAM 2 MG/ML
2 INJECTION INTRAMUSCULAR EVERY 6 HOURS PRN
Status: CANCELLED | OUTPATIENT
Start: 2023-11-14

## 2023-11-14 RX ORDER — LORAZEPAM 2 MG/ML
2 INJECTION INTRAMUSCULAR
Status: DISCONTINUED | OUTPATIENT
Start: 2023-11-14 | End: 2024-03-29 | Stop reason: HOSPADM

## 2023-11-14 RX ORDER — LORAZEPAM 2 MG/ML
1 INJECTION INTRAMUSCULAR
Status: CANCELLED | OUTPATIENT
Start: 2023-11-14

## 2023-11-14 RX ORDER — AMOXICILLIN 250 MG
1 CAPSULE ORAL DAILY PRN
Status: CANCELLED | OUTPATIENT
Start: 2023-11-14

## 2023-11-14 RX ORDER — LORAZEPAM 2 MG/ML
2 INJECTION INTRAMUSCULAR
Status: CANCELLED | OUTPATIENT
Start: 2023-11-14

## 2023-11-14 RX ORDER — LANOLIN ALCOHOL/MO/W.PET/CERES
3 CREAM (GRAM) TOPICAL
Status: CANCELLED | OUTPATIENT
Start: 2023-11-14

## 2023-11-14 RX ORDER — TRAZODONE HYDROCHLORIDE 50 MG/1
50 TABLET ORAL
Status: CANCELLED | OUTPATIENT
Start: 2023-11-14

## 2023-11-14 RX ORDER — HALOPERIDOL 1 MG/1
1 TABLET ORAL EVERY 6 HOURS PRN
Status: DISCONTINUED | OUTPATIENT
Start: 2023-11-14 | End: 2024-03-29 | Stop reason: HOSPADM

## 2023-11-14 RX ORDER — AMOXICILLIN 250 MG
1 CAPSULE ORAL DAILY PRN
Status: DISCONTINUED | OUTPATIENT
Start: 2023-11-14 | End: 2024-03-29 | Stop reason: HOSPADM

## 2023-11-14 RX ORDER — BENZTROPINE MESYLATE 1 MG/ML
1 INJECTION INTRAMUSCULAR; INTRAVENOUS
Status: DISCONTINUED | OUTPATIENT
Start: 2023-11-14 | End: 2024-03-29 | Stop reason: HOSPADM

## 2023-11-14 RX ORDER — DIPHENHYDRAMINE HYDROCHLORIDE 50 MG/ML
50 INJECTION INTRAMUSCULAR; INTRAVENOUS EVERY 6 HOURS PRN
Status: DISCONTINUED | OUTPATIENT
Start: 2023-11-14 | End: 2024-03-29 | Stop reason: HOSPADM

## 2023-11-14 RX ORDER — HALOPERIDOL 5 MG/ML
5 INJECTION INTRAMUSCULAR
Status: CANCELLED | OUTPATIENT
Start: 2023-11-14

## 2023-11-14 RX ORDER — BENZTROPINE MESYLATE 1 MG/ML
1 INJECTION INTRAMUSCULAR; INTRAVENOUS
Status: CANCELLED | OUTPATIENT
Start: 2023-11-14

## 2023-11-14 RX ORDER — ACETAMINOPHEN 325 MG/1
650 TABLET ORAL EVERY 6 HOURS PRN
Status: DISCONTINUED | OUTPATIENT
Start: 2023-11-14 | End: 2024-03-29 | Stop reason: HOSPADM

## 2023-11-14 RX ORDER — HYDROXYZINE HYDROCHLORIDE 25 MG/1
25 TABLET, FILM COATED ORAL
Status: DISCONTINUED | OUTPATIENT
Start: 2023-11-14 | End: 2024-03-29 | Stop reason: HOSPADM

## 2023-11-14 RX ORDER — HYDROXYZINE HYDROCHLORIDE 25 MG/1
100 TABLET, FILM COATED ORAL
Status: CANCELLED | OUTPATIENT
Start: 2023-11-14

## 2023-11-14 RX ORDER — ACETAMINOPHEN 325 MG/1
975 TABLET ORAL EVERY 6 HOURS PRN
Status: CANCELLED | OUTPATIENT
Start: 2023-11-14

## 2023-11-14 RX ORDER — HYDROXYZINE 50 MG/1
100 TABLET, FILM COATED ORAL
Status: DISCONTINUED | OUTPATIENT
Start: 2023-11-14 | End: 2024-03-29 | Stop reason: HOSPADM

## 2023-11-14 RX ORDER — BENZTROPINE MESYLATE 1 MG/ML
0.5 INJECTION INTRAMUSCULAR; INTRAVENOUS
Status: DISCONTINUED | OUTPATIENT
Start: 2023-11-14 | End: 2024-03-29 | Stop reason: HOSPADM

## 2023-11-14 RX ORDER — ACETAMINOPHEN 325 MG/1
650 TABLET ORAL EVERY 4 HOURS PRN
Status: DISCONTINUED | OUTPATIENT
Start: 2023-11-14 | End: 2024-03-29 | Stop reason: HOSPADM

## 2023-11-14 RX ORDER — BISACODYL 10 MG
10 SUPPOSITORY, RECTAL RECTAL DAILY PRN
Status: CANCELLED | OUTPATIENT
Start: 2023-11-14

## 2023-11-14 RX ORDER — HYDROXYZINE HYDROCHLORIDE 25 MG/1
25 TABLET, FILM COATED ORAL
Status: CANCELLED | OUTPATIENT
Start: 2023-11-14

## 2023-11-14 RX ORDER — HALOPERIDOL 5 MG/ML
2.5 INJECTION INTRAMUSCULAR
Status: DISCONTINUED | OUTPATIENT
Start: 2023-11-14 | End: 2024-03-29 | Stop reason: HOSPADM

## 2023-11-14 RX ORDER — HYDROXYZINE HYDROCHLORIDE 25 MG/1
50 TABLET, FILM COATED ORAL
Status: CANCELLED | OUTPATIENT
Start: 2023-11-14

## 2023-11-14 RX ORDER — ACETAMINOPHEN 325 MG/1
650 TABLET ORAL EVERY 4 HOURS PRN
Status: CANCELLED | OUTPATIENT
Start: 2023-11-14

## 2023-11-14 RX ORDER — POLYETHYLENE GLYCOL 3350 17 G/17G
17 POWDER, FOR SOLUTION ORAL DAILY PRN
Status: CANCELLED | OUTPATIENT
Start: 2023-11-14

## 2023-11-14 RX ORDER — MAGNESIUM HYDROXIDE/ALUMINUM HYDROXICE/SIMETHICONE 120; 1200; 1200 MG/30ML; MG/30ML; MG/30ML
30 SUSPENSION ORAL EVERY 4 HOURS PRN
Status: DISCONTINUED | OUTPATIENT
Start: 2023-11-14 | End: 2024-03-29 | Stop reason: HOSPADM

## 2023-11-14 RX ORDER — LORAZEPAM 2 MG/ML
1 INJECTION INTRAMUSCULAR
Status: DISCONTINUED | OUTPATIENT
Start: 2023-11-14 | End: 2024-03-29 | Stop reason: HOSPADM

## 2023-11-14 RX ORDER — HALOPERIDOL 5 MG/1
5 TABLET ORAL
Status: CANCELLED | OUTPATIENT
Start: 2023-11-14

## 2023-11-14 RX ORDER — TRAZODONE HYDROCHLORIDE 50 MG/1
50 TABLET ORAL
Status: DISCONTINUED | OUTPATIENT
Start: 2023-11-14 | End: 2024-03-29 | Stop reason: HOSPADM

## 2023-11-14 RX ORDER — BENZTROPINE MESYLATE 1 MG/1
1 TABLET ORAL
Status: DISCONTINUED | OUTPATIENT
Start: 2023-11-14 | End: 2024-03-29 | Stop reason: HOSPADM

## 2023-11-14 RX ORDER — HALOPERIDOL 5 MG/1
5 TABLET ORAL
Status: DISCONTINUED | OUTPATIENT
Start: 2023-11-14 | End: 2024-03-29 | Stop reason: HOSPADM

## 2023-11-14 RX ORDER — HALOPERIDOL 1 MG/1
2.5 TABLET ORAL
Status: CANCELLED | OUTPATIENT
Start: 2023-11-14

## 2023-11-14 RX ORDER — LORAZEPAM 2 MG/ML
2 INJECTION INTRAMUSCULAR EVERY 6 HOURS PRN
Status: DISCONTINUED | OUTPATIENT
Start: 2023-11-14 | End: 2024-03-29 | Stop reason: HOSPADM

## 2023-11-14 RX ORDER — HALOPERIDOL 5 MG/ML
5 INJECTION INTRAMUSCULAR
Status: DISCONTINUED | OUTPATIENT
Start: 2023-11-14 | End: 2024-03-29 | Stop reason: HOSPADM

## 2023-11-14 RX ORDER — POLYETHYLENE GLYCOL 3350 17 G/17G
17 POWDER, FOR SOLUTION ORAL DAILY PRN
Status: DISCONTINUED | OUTPATIENT
Start: 2023-11-14 | End: 2023-11-15

## 2023-11-14 RX ORDER — LANOLIN ALCOHOL/MO/W.PET/CERES
3 CREAM (GRAM) TOPICAL
Status: DISCONTINUED | OUTPATIENT
Start: 2023-11-14 | End: 2024-03-29 | Stop reason: HOSPADM

## 2023-11-14 RX ORDER — HALOPERIDOL 5 MG/ML
2.5 INJECTION INTRAMUSCULAR
Status: CANCELLED | OUTPATIENT
Start: 2023-11-14

## 2023-11-14 RX ORDER — NICOTINE 21 MG/24HR
21 PATCH, TRANSDERMAL 24 HOURS TRANSDERMAL ONCE
Status: DISCONTINUED | OUTPATIENT
Start: 2023-11-14 | End: 2023-11-14 | Stop reason: HOSPADM

## 2023-11-14 RX ORDER — BENZTROPINE MESYLATE 1 MG/ML
0.5 INJECTION INTRAMUSCULAR; INTRAVENOUS
Status: CANCELLED | OUTPATIENT
Start: 2023-11-14

## 2023-11-14 RX ORDER — OLANZAPINE 5 MG/1
5 TABLET, ORALLY DISINTEGRATING ORAL ONCE
Status: COMPLETED | OUTPATIENT
Start: 2023-11-14 | End: 2023-11-14

## 2023-11-14 RX ORDER — ACETAMINOPHEN 325 MG/1
975 TABLET ORAL EVERY 6 HOURS PRN
Status: DISCONTINUED | OUTPATIENT
Start: 2023-11-14 | End: 2024-03-29 | Stop reason: HOSPADM

## 2023-11-14 RX ORDER — LORAZEPAM 1 MG/1
1 TABLET ORAL ONCE
Status: COMPLETED | OUTPATIENT
Start: 2023-11-14 | End: 2023-11-14

## 2023-11-14 RX ORDER — BISACODYL 10 MG
10 SUPPOSITORY, RECTAL RECTAL DAILY PRN
Status: DISCONTINUED | OUTPATIENT
Start: 2023-11-14 | End: 2023-11-15

## 2023-11-14 RX ADMIN — OLANZAPINE 5 MG: 5 TABLET, ORALLY DISINTEGRATING ORAL at 18:41

## 2023-11-14 RX ADMIN — LORAZEPAM 1 MG: 1 TABLET ORAL at 15:57

## 2023-11-14 RX ADMIN — NICOTINE POLACRILEX 4 MG: 4 GUM, CHEWING ORAL at 23:20

## 2023-11-14 RX ADMIN — TRAZODONE HYDROCHLORIDE 50 MG: 50 TABLET ORAL at 23:20

## 2023-11-14 RX ADMIN — NICOTINE 21 MG: 21 PATCH, EXTENDED RELEASE TRANSDERMAL at 15:57

## 2023-11-14 RX ADMIN — MELATONIN TAB 3 MG 3 MG: 3 TAB at 23:20

## 2023-11-14 NOTE — ED PROVIDER NOTES
History  Chief Complaint   Patient presents with    Psychiatric Evaluation     Pt arrives tearful and appears to be angry c/o hearing voices, denies SI or HI, pt is willing to sign in as a [address]      Patient is a 71-year-old male past medical history of GERD and schizophrenia presenting for auditory hallucinations. Patient notes auditory hallucinations intermittent for a year restarting again 1 week ago. States he has been compliant with his medication his last psychiatric hospitalization was roughly 3 months ago. Denies any drug or alcohol use and denies any homicidal ideation or visual hallucinations. States that they stated they are to kill everyone but they do not command him to harm anyone. Prior to Admission Medications   Prescriptions Last Dose Informant Patient Reported? Taking? OLANZapine (ZyPREXA) 20 MG tablet   No No   Sig: Take 1 tablet (20 mg total) by mouth daily at bedtime   benztropine (COGENTIN) 0.5 mg tablet   No No   Sig: Take 1 tablet (0.5 mg total) by mouth 2 (two) times a day   escitalopram (LEXAPRO) 10 mg tablet   No No   Sig: Take 1 tablet (10 mg total) by mouth daily   haloperidol (HALDOL) 10 mg tablet   No No   Sig: Take 1 tablet (10 mg total) by mouth 2 (two) times a day   lithium carbonate (LITHOBID) 300 mg CR tablet   No No   Sig: Take 2 tablets (600 mg total) by mouth daily at bedtime   mirtazapine (REMERON) 30 mg tablet   No No   Sig: Take 1 tablet (30 mg total) by mouth daily at bedtime   traZODone (DESYREL) 50 mg tablet   No No   Sig: Take 1 tablet (50 mg total) by mouth daily at bedtime      Facility-Administered Medications: None       Past Medical History:   Diagnosis Date    Hallucination     Schizophrenia [facility])        Past Surgical History:   Procedure Laterality Date    COLONOSCOPY         No family history on file. I have reviewed and agree with the history as documented.     E-Cigarette/Vaping    E-Cigarette Use Current Some Day User      E-Cigarette/Vaping Substances    Nicotine Yes      Social History     Tobacco Use    Smoking status: Every Day     Packs/day: 0.25     Types: Cigarettes    Smokeless tobacco: Never    Tobacco comments:     Pt reports he does not want to quit and does not want information. He declines Quitline information   Vaping Use    Vaping Use: Some days    Substances: Nicotine   Substance Use Topics    Alcohol use: Not Currently     Comment: pt reports he does not drink    Drug use: Not Currently       Review of Systems   All other systems reviewed and are negative. Physical Exam  Physical Exam  Vitals reviewed. Constitutional:       General: He is not in acute distress. Appearance: Normal appearance. He is not ill-appearing. HENT:      Mouth/Throat:      Mouth: Mucous membranes are moist.   Eyes:      Conjunctiva/sclera: Conjunctivae normal.   Cardiovascular:      Rate and Rhythm: Normal rate. Pulmonary:      Effort: Pulmonary effort is normal.   Musculoskeletal:         General: No swelling. Normal range of motion. Cervical back: Neck supple. Skin:     General: Skin is warm and dry. Neurological:      General: No focal deficit present. Mental Status: He is alert and oriented to person, place, and time. Motor: No weakness.       Coordination: Coordination normal.   Psychiatric:         Mood and Affect: Mood normal.         Vital Signs  ED Triage Vitals [11/14/23 1218]   Temperature Pulse Respirations Blood Pressure SpO2   98.9 °F (37.2 °C) 89 18 (!) 174/100 98 %      Temp Source Heart Rate Source Patient Position - Orthostatic VS BP Location FiO2 (%)   Temporal -- Sitting Left arm --      Pain Score       No Pain           Vitals:    11/14/23 1218   BP: (!) 174/100   Pulse: 89   Patient Position - Orthostatic VS: Sitting         Visual Acuity      ED Medications  Medications - No data to display    Diagnostic Studies  Results Reviewed       None                   No orders to display Procedures  Procedures         ED Course                               SBIRT 22yo+      Flowsheet Row Most Recent Value   Initial Alcohol Screen: US AUDIT-C     1. How often do you have a drink containing alcohol? 0 Filed at: 11/14/2023 1220   2. How many drinks containing alcohol do you have on a typical day you are drinking? 0 Filed at: 11/14/2023 1220   3a. Male UNDER 65: How often do you have five or more drinks on one occasion? 0 Filed at: 11/14/2023 1220   3b. FEMALE Any Age, or MALE 65+: How often do you have 4 or more drinks on one occassion? 0 Filed at: 11/14/2023 1220   Audit-C Score 0 Filed at: 11/14/2023 1220   MARIELA: How many times in the past year have you. .. Used an illegal drug or used a prescription medication for non-medical reasons? Never Filed at: 11/14/2023 1220                      Medical Decision Making  Patient is a 81-year-old male past medical history of GERD, schizophrenia presenting for auditory hallucinations. Patient is well-appearing at bedside with stable vitals and in no acute distress. He is oriented and has no significant physical exam findings. Will consult crisis for further management. Amount and/or Complexity of Data Reviewed  Labs: ordered. Disposition  Final diagnoses:   None     ED Disposition       None          Follow-up Information    None         Patient's Medications   Discharge Prescriptions    No medications on file       No discharge procedures on file.     PDMP Review       None            ED Provider  Electronically Signed by             Adolfo Fernandez DO  11/15/23 9325

## 2023-11-14 NOTE — ED NOTES
Per EVS, patient's [place] coverage is F[employer] For Service. Patient has a [employer] card scanned in, however, per Suri Melgar his coverage termed as of 02/01/2022.     Madeline Solares, CECILIA  11/14/23    [id number]

## 2023-11-14 NOTE — LETTER
[address] [facility] [address]  Dept: [phone]      CNXZTE TRANSFER CONSENT    NAME Juan Francisco Carroll                             1995                              MRN [id number]    I have been informed of my rights regarding examination, treatment, and transfer   by Dr. Shraddha Red MD    Benefits: Continuity of care    Risks: Potential for delay in receiving treatment      Consent for Transfer:  I acknowledge that my medical condition has been evaluated and explained to me by the emergency department physician or other qualified medical person and/or my attending physician, who has recommended that I be transferred to the service of  Accepting Physician: Dr Everette Dos Santos at [address] [place] [address] [address] Name, [facility] : [facility], [address]. The above potential benefits of such transfer, the potential risks associated with such transfer, and the probable risks of not being transferred have been explained to me, and I fully understand them. The doctor has explained that, in my case, the benefits of transfer outweigh the risks. I agree to be transferred. I authorize the performance of emergency medical procedures and treatments upon me in both transit and upon arrival at the receiving facility. Additionally, I authorize the release of any and all medical records to the receiving facility and request they be transported with me, if possible. I understand that the safest mode of transportation during a medical emergency is an ambulance and that the Hospital advocates the use of this mode of transport. Risks of traveling to the receiving facility by car, including absence of medical control, life sustaining equipment, such as oxygen, and medical personnel has been explained to me and I fully understand them. (NATALYA CORRECT BOX BELOW)  [ X ]  I consent to the stated transfer and to be transported by ambulance/helicopter.   [  ]  I consent to the stated transfer, but refuse transportation by ambulance and accept full responsibility for my transportation by car. I understand the risks of non-ambulance transfers and I exonerate the Hospital and its staff from any deterioration in my condition that results from this refusal.    X___________________________________________    DATE  23  TIME________  Signature of patient or legally responsible individual signing on patient behalf           RELATIONSHIP TO PATIENT___Self______________________                                  Provider [address] [place], [place] [address]B 1995                              MRN [id number]    A medical screening exam was performed on the above named patient. Based on the examination:    Condition Necessitating Transfer The encounter diagnosis was Psychosis ([address]). Patient Condition: The patient has been stabilized such that within reasonable medical probability, no material deterioration of the patient condition or the condition of the unborn child(juan manuel) is likely to result from the transfer    Reason for Transfer: Level of Care needed not available at this facility    Transfer Requirements: Facility [facility] [address]   Space available and qualified personnel available for treatment as acknowledged by Juan Tir[facility], [facility] @ [phone]  Agreed to accept transfer and to provide appropriate medical treatment as acknowledged by       Dr Bree Christine  Appropriate medical records of the examination and treatment of the patient are provided at the time of transfer   [address] __A. A._____  Transfer will be performed by qualified personnel from [address]  and appropriate transfer equipment as required, including the use of necessary and appropriate life support measures.     Provider Certification: I have examined the patient and explained the following risks and benefits of being transferred/refusing transfer to the patient/family:  General risk, such as traffic hazards, adverse weather conditions, rough terrain or turbulence, possible failure of equipment (including vehicle or aircraft), or consequences of actions of persons outside the control of the transport personnel      Based on these reasonable risks and benefits to the patient and/or the unborn child(juan manuel), and based upon the information available at the time of the patient’s examination, I certify that the medical benefits reasonably to be expected from the provision of appropriate medical treatments at another medical facility outweigh the increasing risks, if any, to the individual’s medical condition, and in the case of labor to the unborn child, from effecting the transfer.     X____________________________________________ DATE 11/14/23        TIME_______      ORIGINAL - SEND TO MEDICAL RECORDS   COPY - SEND WITH PATIENT DURING TRANSFER

## 2023-11-15 PROBLEM — E55.9 VITAMIN D DEFICIENCY: Status: ACTIVE | Noted: 2023-11-15

## 2023-11-15 PROBLEM — K59.04 CHRONIC IDIOPATHIC CONSTIPATION: Status: ACTIVE | Noted: 2019-05-09

## 2023-11-15 PROBLEM — E53.8 VITAMIN B 12 DEFICIENCY: Status: ACTIVE | Noted: 2023-11-15

## 2023-11-15 PROBLEM — F25.9: Status: ACTIVE | Noted: 2022-02-28

## 2023-11-15 PROBLEM — D64.9 ANEMIA: Status: ACTIVE | Noted: 2019-05-09

## 2023-11-15 PROBLEM — F25.0 SCHIZOAFFECTIVE DISORDER, BIPOLAR TYPE (HCC): Chronic | Status: ACTIVE | Noted: 2023-06-29

## 2023-11-15 LAB
25(OH)D3 SERPL-MCNC: 7.6 NG/ML (ref 30–100)
ALBUMIN SERPL BCP-MCNC: 4.1 G/DL (ref 3.5–5)
ALP SERPL-CCNC: 69 U/L (ref 34–104)
ALT SERPL W P-5'-P-CCNC: 40 U/L (ref 7–52)
ANION GAP SERPL CALCULATED.3IONS-SCNC: 8 MMOL/L
AST SERPL W P-5'-P-CCNC: 22 U/L (ref 13–39)
ATRIAL RATE: 62 BPM
BACTERIA UR QL AUTO: ABNORMAL /HPF
BASOPHILS # BLD AUTO: 0.05 THOUSANDS/ÂΜL (ref 0–0.1)
BASOPHILS NFR BLD AUTO: 1 % (ref 0–1)
BILIRUB SERPL-MCNC: 0.45 MG/DL (ref 0.2–1)
BILIRUB UR QL STRIP: NEGATIVE
BUN SERPL-MCNC: 8 MG/DL (ref 5–25)
CALCIUM SERPL-MCNC: 9.1 MG/DL (ref 8.4–10.2)
CHLORIDE SERPL-SCNC: 105 MMOL/L (ref 96–108)
CHOLEST SERPL-MCNC: 169 MG/DL
CLARITY UR: CLEAR
CO2 SERPL-SCNC: 26 MMOL/L (ref 21–32)
COLOR UR: ABNORMAL
CREAT SERPL-MCNC: 0.92 MG/DL (ref 0.6–1.3)
EOSINOPHIL # BLD AUTO: 0.24 THOUSAND/ÂΜL (ref 0–0.61)
EOSINOPHIL NFR BLD AUTO: 2 % (ref 0–6)
ERYTHROCYTE [DISTWIDTH] IN BLOOD BY AUTOMATED COUNT: 14 % (ref 11.6–15.1)
FERRITIN SERPL-MCNC: 56 NG/ML (ref 24–336)
FOLATE SERPL-MCNC: 7.8 NG/ML
GFR SERPL CREATININE-BSD FRML MDRD: 113 ML/MIN/1.73SQ M
GLUCOSE P FAST SERPL-MCNC: 97 MG/DL (ref 65–99)
GLUCOSE SERPL-MCNC: 97 MG/DL (ref 65–140)
GLUCOSE UR STRIP-MCNC: NEGATIVE MG/DL
HCT VFR BLD AUTO: 42.4 % (ref 36.5–49.3)
HDLC SERPL-MCNC: 37 MG/DL
HGB BLD-MCNC: 12.8 G/DL (ref 12–17)
HGB UR QL STRIP.AUTO: NEGATIVE
IMM GRANULOCYTES # BLD AUTO: 0.03 THOUSAND/UL (ref 0–0.2)
IMM GRANULOCYTES NFR BLD AUTO: 0 % (ref 0–2)
IRON SATN MFR SERPL: 24 % (ref 15–50)
IRON SERPL-MCNC: 71 UG/DL (ref 50–212)
KETONES UR STRIP-MCNC: NEGATIVE MG/DL
LDLC SERPL CALC-MCNC: 109 MG/DL (ref 0–100)
LEUKOCYTE ESTERASE UR QL STRIP: 25
LYMPHOCYTES # BLD AUTO: 2.73 THOUSANDS/ÂΜL (ref 0.6–4.47)
LYMPHOCYTES NFR BLD AUTO: 27 % (ref 14–44)
MCH RBC QN AUTO: 24.1 PG (ref 26.8–34.3)
MCHC RBC AUTO-ENTMCNC: 30.2 G/DL (ref 31.4–37.4)
MCV RBC AUTO: 80 FL (ref 82–98)
MONOCYTES # BLD AUTO: 0.85 THOUSAND/ÂΜL (ref 0.17–1.22)
MONOCYTES NFR BLD AUTO: 8 % (ref 4–12)
MUCOUS THREADS UR QL AUTO: ABNORMAL
NEUTROPHILS # BLD AUTO: 6.25 THOUSANDS/ÂΜL (ref 1.85–7.62)
NEUTS SEG NFR BLD AUTO: 62 % (ref 43–75)
NITRITE UR QL STRIP: NEGATIVE
NON-SQ EPI CELLS URNS QL MICRO: ABNORMAL /HPF
NONHDLC SERPL-MCNC: 132 MG/DL
NRBC BLD AUTO-RTO: 0 /100 WBCS
P AXIS: 49 DEGREES
PH UR STRIP.AUTO: 6 [PH]
PLATELET # BLD AUTO: 252 THOUSANDS/UL (ref 149–390)
PMV BLD AUTO: 10.6 FL (ref 8.9–12.7)
POTASSIUM SERPL-SCNC: 3.9 MMOL/L (ref 3.5–5.3)
PR INTERVAL: 142 MS
PROT SERPL-MCNC: 6.5 G/DL (ref 6.4–8.4)
PROT UR STRIP-MCNC: ABNORMAL MG/DL
QRS AXIS: 45 DEGREES
QRSD INTERVAL: 92 MS
QT INTERVAL: 392 MS
QTC INTERVAL: 397 MS
RBC # BLD AUTO: 5.32 MILLION/UL (ref 3.88–5.62)
RBC #/AREA URNS AUTO: ABNORMAL /HPF
SODIUM SERPL-SCNC: 139 MMOL/L (ref 135–147)
SP GR UR STRIP.AUTO: 1.02 (ref 1–1.04)
T WAVE AXIS: 18 DEGREES
TIBC SERPL-MCNC: 302 UG/DL (ref 250–450)
TRIGL SERPL-MCNC: 117 MG/DL
TSH SERPL DL<=0.05 MIU/L-ACNC: 1.06 UIU/ML (ref 0.45–4.5)
UIBC SERPL-MCNC: 231 UG/DL (ref 155–355)
UROBILINOGEN UA: 4 MG/DL
VENTRICULAR RATE: 62 BPM
VIT B12 SERPL-MCNC: 219 PG/ML (ref 180–914)
WBC # BLD AUTO: 10.15 THOUSAND/UL (ref 4.31–10.16)
WBC #/AREA URNS AUTO: ABNORMAL /HPF

## 2023-11-15 PROCEDURE — 82728 ASSAY OF FERRITIN: CPT | Performed by: NURSE PRACTITIONER

## 2023-11-15 PROCEDURE — 81001 URINALYSIS AUTO W/SCOPE: CPT | Performed by: PSYCHIATRY & NEUROLOGY

## 2023-11-15 PROCEDURE — 85025 COMPLETE CBC W/AUTO DIFF WBC: CPT | Performed by: PSYCHIATRY & NEUROLOGY

## 2023-11-15 PROCEDURE — 84443 ASSAY THYROID STIM HORMONE: CPT | Performed by: PSYCHIATRY & NEUROLOGY

## 2023-11-15 PROCEDURE — 93010 ELECTROCARDIOGRAM REPORT: CPT | Performed by: STUDENT IN AN ORGANIZED HEALTH CARE EDUCATION/TRAINING PROGRAM

## 2023-11-15 PROCEDURE — 82746 ASSAY OF FOLIC ACID SERUM: CPT | Performed by: PSYCHIATRY & NEUROLOGY

## 2023-11-15 PROCEDURE — 82607 VITAMIN B-12: CPT | Performed by: PSYCHIATRY & NEUROLOGY

## 2023-11-15 PROCEDURE — 82306 VITAMIN D 25 HYDROXY: CPT | Performed by: PSYCHIATRY & NEUROLOGY

## 2023-11-15 PROCEDURE — 83036 HEMOGLOBIN GLYCOSYLATED A1C: CPT | Performed by: PSYCHIATRY & NEUROLOGY

## 2023-11-15 PROCEDURE — 83550 IRON BINDING TEST: CPT | Performed by: NURSE PRACTITIONER

## 2023-11-15 PROCEDURE — 93005 ELECTROCARDIOGRAM TRACING: CPT

## 2023-11-15 PROCEDURE — 80053 COMPREHEN METABOLIC PANEL: CPT | Performed by: PSYCHIATRY & NEUROLOGY

## 2023-11-15 PROCEDURE — 83540 ASSAY OF IRON: CPT | Performed by: NURSE PRACTITIONER

## 2023-11-15 PROCEDURE — 99223 1ST HOSP IP/OBS HIGH 75: CPT | Performed by: PSYCHIATRY & NEUROLOGY

## 2023-11-15 PROCEDURE — 99253 IP/OBS CNSLTJ NEW/EST LOW 45: CPT | Performed by: NURSE PRACTITIONER

## 2023-11-15 PROCEDURE — 80061 LIPID PANEL: CPT | Performed by: PSYCHIATRY & NEUROLOGY

## 2023-11-15 RX ORDER — LITHIUM CARBONATE 300 MG/1
600 TABLET, FILM COATED, EXTENDED RELEASE ORAL
Status: DISCONTINUED | OUTPATIENT
Start: 2023-11-15 | End: 2023-11-20

## 2023-11-15 RX ORDER — BENZTROPINE MESYLATE 0.5 MG/1
0.5 TABLET ORAL 2 TIMES DAILY
Status: DISCONTINUED | OUTPATIENT
Start: 2023-11-15 | End: 2024-01-18

## 2023-11-15 RX ORDER — ESCITALOPRAM OXALATE 10 MG/1
10 TABLET ORAL DAILY
Status: DISCONTINUED | OUTPATIENT
Start: 2023-11-15 | End: 2023-11-26

## 2023-11-15 RX ORDER — MIRTAZAPINE 30 MG/1
30 TABLET, FILM COATED ORAL
Status: DISCONTINUED | OUTPATIENT
Start: 2023-11-15 | End: 2023-11-17

## 2023-11-15 RX ORDER — OLANZAPINE 10 MG/1
20 TABLET ORAL
Status: DISCONTINUED | OUTPATIENT
Start: 2023-11-15 | End: 2023-12-14

## 2023-11-15 RX ORDER — POLYETHYLENE GLYCOL 3350 17 G/17G
17 POWDER, FOR SOLUTION ORAL DAILY
Status: DISCONTINUED | OUTPATIENT
Start: 2023-11-16 | End: 2024-03-29 | Stop reason: HOSPADM

## 2023-11-15 RX ORDER — HALOPERIDOL 10 MG/1
10 TABLET ORAL 2 TIMES DAILY
Status: DISCONTINUED | OUTPATIENT
Start: 2023-11-15 | End: 2023-11-20

## 2023-11-15 RX ORDER — POLYETHYLENE GLYCOL 3350 17 G/17G
17 POWDER, FOR SOLUTION ORAL DAILY PRN
Status: DISCONTINUED | OUTPATIENT
Start: 2023-11-15 | End: 2024-03-29 | Stop reason: HOSPADM

## 2023-11-15 RX ORDER — BISACODYL 10 MG
10 SUPPOSITORY, RECTAL RECTAL DAILY PRN
Status: DISCONTINUED | OUTPATIENT
Start: 2023-11-15 | End: 2024-03-29 | Stop reason: HOSPADM

## 2023-11-15 RX ORDER — MELATONIN
2000 DAILY
Status: DISCONTINUED | OUTPATIENT
Start: 2023-11-15 | End: 2024-01-02

## 2023-11-15 RX ORDER — TRAZODONE HYDROCHLORIDE 50 MG/1
50 TABLET ORAL
Status: DISCONTINUED | OUTPATIENT
Start: 2023-11-15 | End: 2023-11-15

## 2023-11-15 RX ADMIN — BENZTROPINE MESYLATE 0.5 MG: 0.5 TABLET ORAL at 17:08

## 2023-11-15 RX ADMIN — MELATONIN TAB 3 MG 3 MG: 3 TAB at 21:06

## 2023-11-15 RX ADMIN — LITHIUM CARBONATE 600 MG: 300 TABLET, EXTENDED RELEASE ORAL at 21:06

## 2023-11-15 RX ADMIN — CYANOCOBALAMIN TAB 1000 MCG 1000 MCG: 1000 TAB at 10:04

## 2023-11-15 RX ADMIN — MIRTAZAPINE 30 MG: 30 TABLET, FILM COATED ORAL at 21:06

## 2023-11-15 RX ADMIN — HALOPERIDOL 10 MG: 10 TABLET ORAL at 17:08

## 2023-11-15 RX ADMIN — ESCITALOPRAM OXALATE 10 MG: 10 TABLET, FILM COATED ORAL at 11:59

## 2023-11-15 RX ADMIN — CHOLECALCIFEROL TAB 25 MCG (1000 UNIT) 2000 UNITS: 25 TAB at 10:04

## 2023-11-15 RX ADMIN — OLANZAPINE 20 MG: 10 TABLET, FILM COATED ORAL at 21:06

## 2023-11-15 RX ADMIN — BENZTROPINE MESYLATE 0.5 MG: 0.5 TABLET ORAL at 11:59
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
## 2023-11-15 NOTE — PROGRESS NOTES
11/15/23 1528   Team Meeting   Meeting Type Daily Rounds   Team Members Present   Team Members Present Physician;Nurse;   Physician Team Member Mark   Nursing Team Member Earl   Care Management Team Member Miguelito Camarillo   Patient/Family Present   Patient Present No   Patient's Family Present No       Patient currently holds 201 status. Patient is complaint with medications and meals. Patient will begin taking lexapro 10mg daily, Hadol 10mg daily, Lithium 600mg HS, Remeron 30mg HS, and Zyprexa 20mg HS. Patient discharge date and plan is to be determined.

## 2023-11-15 NOTE — PROGRESS NOTES
"Patient presented to be calm and cooperative. Patient was compliant with signing forms and answering questions. Patient was alert, oriented, and engaged in encounter.   Patient Intake     Age / Race / Sex / Living Arrangement / Marital Status / Children 28/  / Male / Aunt's House / Single/ no children   Is patient a ?  denied   Education / Income Source / Type of work / Rep Payee    High school / SSD / Unemployed   Patient's Telephone Number   [phone]   Emergency Contacts Aunt Kell - [phone]     Can patient return home if yes to what address Yes [address] [facility]      Discharge transportation? Aunt Kell    Health Insurance / Prescription Coverage Denied    Preferred Pharmacy  [facility]- [address] [place] [address]     Admission Status      Status of admission 201   [facility]    Advance Directives Denied      Patient History   Stressors / Triggers to current admission Hearing Voices telling him to kill himself and his family and that he is going to hell.     Treatment History Estimated 6 prior admissions   Hx of Suicide Attempts denied   Family history of Mental illness denied   Trauma History denied   Eating Disorder denied   Substance Abuse     AUDIT Scor -  0    ,  PAWSS -0   Inpatient Rehab Tx History denied   Legal Issues denied   Access to firearms Denied   Support system   Current Psychiatrist / Therapist [facility]   ACT / ICM-BCM-TCM /     Denied    PCP  denied   Family-friends supports  Aunt Kell and sister Marleny    Hobbies / Interests / Coping skills Basketball / Football / Netflix   Goals \"To stop hearing voices\"   Releases of Information  Aunt / sister / [facility]   Referrals [facility]     "
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
## 2023-11-15 NOTE — CONSULTS
[facility]  Consult  Name: Alberto Berumen 28 y.o. male I MRN: [id number]  Unit/Bed#: [facility] 383-02 I Date of Admission: 11/14/2023   Date of Service: 11/15/2023 I Hospital Day: 1    Inpatient consult for Medical Clearance for  patient  Consult performed by: HOLLI Monge  Consult ordered by: Yasmin Harvey MD        Assessment/Plan   Medical clearance for psychiatric admission  Assessment & Plan  Patient is medically cleared for admission to [facility] and treatment of underlying psychiatric illness based on available results  No acute medical needs. Medicine will sign off. Please call with additional questions or concerns    Vitamin B 12 deficiency  Assessment & Plan  Noted on admission labs   Will replete with Vitamin B12    Chronic idiopathic constipation  Assessment & Plan  Will add Miralax     T wave inversion in EKG  Assessment & Plan  Noted on prior EKGs   Chest pain free   Recommend outpatient cardiology evaluation     Tobacco abuse  Assessment & Plan  Smoking cessation education and counseling  Nicotine patch     GERD (gastroesophageal reflux disease)  Assessment & Plan  Will continue Maalox PRN  Consider adding a standing medications pending course      * Schizoaffective disorder, bipolar type (HCC)  Assessment & Plan  [facility]  Management per primary service              Recommendations for Discharge:  SLIM will sign off - please call with questions or concerns.  Follow up with PCP upon discharge.     Counseling / Coordination of Care Time: 30 minutes.  Greater than 50% of total time spent on patient counseling and coordination of care.    Collaboration of Care: Were Recommendations Directly Discussed with Primary Treatment Team? - Yes     History of Present Illness:    Alberto Berumen is a 28 y.o. male with a PMH including tobacco abuse, GERD, EKG changes, and schizophrenia who is originally admitted to the psychiatric service due to auditory  hallucinations and passive SI. We are consulted for medical clearance for psychiatric hospitalization and medical management. Patient presented to [facility] ED tearful and angry complaining of hearing voices per chart reviewed. Patient previously admitted to [facility]. Patient was seen by Crisis. He signed a 201 and [facility]     Review of Systems:    Review of Systems   Constitutional:  Negative for appetite change and chills.   HENT:  Negative for congestion and sore throat.    Eyes:  Negative for visual disturbance.   Respiratory:  Negative for cough and shortness of breath.    Cardiovascular:  Negative for chest pain.   Gastrointestinal:  Negative for abdominal pain, constipation, diarrhea, nausea and vomiting.   Genitourinary:  Negative for difficulty urinating.   Musculoskeletal:  Negative for gait problem.   Skin:  Negative for wound.   Neurological:  Negative for dizziness, light-headedness and headaches.       Past Medical and Surgical History:     Past Medical History:   Diagnosis Date    Anemia     Chronic idiopathic constipation     GERD (gastroesophageal reflux disease)     Schizoaffective disorder (HCC)     Syringoma     T wave inversion in EKG        Past Surgical History:   Procedure Laterality Date    COLONOSCOPY         Meds/Allergies:    all medications and allergies reviewed    Allergies:   Allergies   Allergen Reactions    Pollen Extract Sneezing       Social History:     Marital Status: Single    Substance Use History:   Social History     Substance and Sexual Activity   Alcohol Use Not Currently    Comment: pt reports he does not drink     Social History     Tobacco Use   Smoking Status Every Day    Packs/day: 0.25    Types: Cigarettes   Smokeless Tobacco Never   Tobacco Comments    Pt reports he does not want to quit and does not want information. He declines Quitline information     Social History     Substance and Sexual Activity   Drug Use Not Currently       Family  "History:    History reviewed. No pertinent family history.    Physical Exam:     Vitals:   Blood Pressure: 128/83 (11/14/23 2244)  Pulse: 66 (11/14/23 2244)  Temperature: 97.8 °F (36.6 °C) (11/14/23 2244)  Temp Source: Temporal (11/14/23 2244)  Respirations: 18 (11/14/23 2244)  Height: 5' 7\" (170.2 cm) (11/14/23 2244)  Weight - Scale: 104 kg (228 lb 12.8 oz) (11/14/23 2244)  SpO2: 99 % (11/14/23 2244)    Physical Exam  Vitals and nursing note reviewed.   Constitutional:       General: He is not in acute distress.     Appearance: He is not toxic-appearing or diaphoretic.   HENT:      Head: Normocephalic.      Mouth/Throat:      Mouth: Mucous membranes are moist.   Eyes:      Conjunctiva/sclera: Conjunctivae normal.   Cardiovascular:      Rate and Rhythm: Normal rate.   Pulmonary:      Effort: Pulmonary effort is normal. No respiratory distress.   Abdominal:      General: Abdomen is flat.      Palpations: Abdomen is soft.   Musculoskeletal:         General: Normal range of motion.      Cervical back: Normal range of motion.      Right lower leg: No edema.      Left lower leg: No edema.   Skin:     General: Skin is warm and dry.      Capillary Refill: Capillary refill takes less than 2 seconds.   Neurological:      Mental Status: He is alert and oriented to person, place, and time. Mental status is at baseline.   Psychiatric:         Mood and Affect: Mood is depressed. Affect is flat.         Speech: Speech normal.         Behavior: Behavior is cooperative.         Additional Data:     Lab Results:     Results from last 7 days   Lab Units 11/15/23  0643   WBC Thousand/uL 10.15   HEMOGLOBIN g/dL 12.8   HEMATOCRIT % 42.4   PLATELETS Thousands/uL 252   NEUTROS PCT % 62   LYMPHS PCT % 27   MONOS PCT % 8   EOS PCT % 2     Results from last 7 days   Lab Units 11/15/23  0643   SODIUM mmol/L 139   POTASSIUM mmol/L 3.9   CHLORIDE mmol/L 105   CO2 mmol/L 26   BUN mg/dL 8   CREATININE mg/dL 0.92   ANION GAP mmol/L 8   CALCIUM " mg/dL 9.1   ALBUMIN g/dL 4.1   TOTAL BILIRUBIN mg/dL 0.45   ALK PHOS U/L 69   ALT U/L 40   AST U/L 22   GLUCOSE RANDOM mg/dL 97             Lab Results   Component Value Date/Time    HGBA1C 4.7 07/13/2023 06:50 AM    HGBA1C 4.6 06/29/2023 05:30 AM               Imaging:  prior imaging reviewed     No orders to display       EKG, Pathology, and Other Studies Reviewed on Admission:   EKG: see above documentation    ** Please Note: This note has been constructed using a voice recognition system. **

## 2023-11-15 NOTE — PLAN OF CARE
Problem: PSYCHOSIS  Goal: Will report no hallucinations or delusions  Description: Interventions:  - Administer medication as  ordered  - Every waking shifts and PRN assess for the presence of hallucinations and or delusions  - Assist with reality testing to support increasing orientation  - Assess if patient's hallucinations or delusions are encouraging self-harm or harm to others and intervene as appropriate  Outcome: Not Progressing     Problem: ANXIETY  Goal: Will report anxiety at manageable levels  Description: INTERVENTIONS:  - Administer medication as ordered  - Teach and encourage coping skills  - Provide emotional support  - Assess patient/family for anxiety and ability to cope  Outcome: Not Progressing     Problem: Ineffective Coping  Goal: Participates in unit activities  Description: Interventions:  - Provide therapeutic environment   - Provide required programming   - Redirect inappropriate behaviors   Outcome: Not Progressing     Problem: Depression  Goal: Treatment Goal: Demonstrate behavioral control of depressive symptoms, verbalize feelings of improved mood/affect, and adopt new coping skills prior to discharge  Outcome: Not Progressing  Goal: Verbalize thoughts and feelings  Description: Interventions:  - Assess and re-assess patient's level of risk   - Engage patient in 1:1 interactions, daily, for a minimum of 15 minutes   - Encourage patient to express feelings, fears, frustrations, hopes   Outcome: Progressing  Goal: Refrain from isolation  Description: Interventions:  - Develop a trusting relationship   - Encourage socialization   Outcome: Not Progressing  Goal: Refrain from self-neglect  Outcome: Progressing     Problem: Ineffective Coping  Goal: Cooperates with admission process  Description: Interventions:   - Complete admission process  Outcome: Completed

## 2023-11-15 NOTE — CASE MANAGEMENT
Writer spoke to patients Aunt Kell after meeting with patient for intake information. Kell shared that she feels unsafe with him returning home and inquired about other options. Writer informed her that group homes typically require insurance that patient denies having. Writer encouraged Kell to speak with [facility] about ICM services. Writer explained she would called [facility] also, writer went on to explain the role of ICMs. Writer then explained the role of [facility] 3IP. Writer shared she would be in contact regarding a discharge plan when a discharge date is set.

## 2023-11-15 NOTE — NURSING NOTE
"Patient is isolative to room. Patient laying down in bed. Patient is calm and cooperative upon approach. Patient reported AH of voices saying, \"go to hell\". Patient verbalizes feeling safe on unit. Patient denied other psych symptoms. Patient is compliant with meds and meals.   "

## 2023-11-15 NOTE — ED NOTES
As per Jennifer Pritchard of Intake, pt will be reviewed at [facility].     Desire Guo, [address], C[facility]  11/14/23 20:17

## 2023-11-15 NOTE — PLAN OF CARE
Problem: PSYCHOSIS  Goal: Will report no hallucinations or delusions  Description: Interventions:  - Administer medication as  ordered  - Every waking shifts and PRN assess for the presence of hallucinations and or delusions  - Assist with reality testing to support increasing orientation  - Assess if patient's hallucinations or delusions are encouraging self-harm or harm to others and intervene as appropriate  11/15/2023 0020 by Endy Frank RN  Outcome: Not Progressing  11/14/2023 2343 by Endy Frank RN  Outcome: Not Progressing     Problem: ANXIETY  Goal: Will report anxiety at manageable levels  Description: INTERVENTIONS:  - Administer medication as ordered  - Teach and encourage coping skills  - Provide emotional support  - Assess patient/family for anxiety and ability to cope  11/15/2023 0020 by Endy Frank RN  Outcome: Not Progressing  11/14/2023 2343 by Endy Frank RN  Outcome: Not Progressing     Problem: Ineffective Coping  Goal: Participates in unit activities  Description: Interventions:  - Provide therapeutic environment   - Provide required programming   - Redirect inappropriate behaviors   11/15/2023 0020 by Endy Frank RN  Outcome: Not Progressing  11/14/2023 2343 by Endy Frank RN  Outcome: Not Progressing     Problem: Depression  Goal: Treatment Goal: Demonstrate behavioral control of depressive symptoms, verbalize feelings of improved mood/affect, and adopt new coping skills prior to discharge  11/15/2023 0020 by Endy Frank RN  Outcome: Not Progressing  11/14/2023 2343 by Endy Frank RN  Outcome: Not Progressing  Goal: Verbalize thoughts and feelings  Description: Interventions:  - Assess and re-assess patient's level of risk   - Engage patient in 1:1 interactions, daily, for a minimum of 15 minutes   - Encourage patient to express feelings, fears, frustrations, hopes   11/15/2023 0020 by Endy Frank RN  Outcome: Progressing  11/14/2023 2343 by Endy  MIGUEL Frank  Outcome: Progressing  Goal: Refrain from isolation  Description: Interventions:  - Develop a trusting relationship   - Encourage socialization   11/15/2023 0020 by Endy Frank RN  Outcome: Not Progressing  11/14/2023 2343 by Endy Frank RN  Outcome: Not Progressing  Goal: Refrain from self-neglect  11/15/2023 0020 by Endy Frank RN  Outcome: Progressing  11/14/2023 2343 by Endy Frank RN  Outcome: Progressing     Problem: Ineffective Coping  Goal: Cooperates with admission process  Description: Interventions:   - Complete admission process  Outcome: Completed

## 2023-11-15 NOTE — ED NOTES
Patient is accepted at [facility], Unit 3P. Patient is accepted by Dr. Bruno Curtis per Jodee Montgomery. Transportation is arranged with [facility] via [address] [place] [address]. Transportation is scheduled for 21:45. Patient may go to the floor at 22:30.      CW informed Joseline JACKMAN of intake of pt's ETA. Nurse report is to be called to [phone] prior to patient transfer.     Milad Stiles, [address], [facility]  11/14/23 20:30

## 2023-11-15 NOTE — ED NOTES
Pt presents to the ED with his mother due to c/o passive suicidal ideations, as well as auditory hallucinations. Pt denies any homicidal ideations, nor any visual hallucinations at this time. Pt notes he has been experiencing suicidal ideations intermittently for the past year, as well as hearing voices saying the voices will kill his family via [address], also for the past year. Pt denies any self injurious behaviors. Pt denies any Hx of suicide attempts. Pt denies any D & A problems, nor any legal issues. Pt denies any Hx of abuse or neglect. Pt reports sleeping 2 hrs nightly with difficulties in both falling and staying asleep. Pt adds that he only eats 1 meal daily. Pt denies any recent weight loss. Pt reports that his family is supportive. He has out-pt Tx services via [facility], and notes he has been hospitalized x6 previously, but cannot recall where. At this time pt is requesting to sign a 201, and Dr Stacey Purcell is in agreement with this Tx plan. Both pt and Dr Stacey Purcell signed the 12 document.      Kurtis Gibson, [address], [place] [place]  11/14/23 20:10

## 2023-11-15 NOTE — QUICK NOTE
Psychiatric Evaluation - Behavioral Health   Alberto Berumen 28 y.o. male MRN: [id number]  Unit/Bed#: U 383-02 Encounter: [id number]    Assessment/Plan   Principal Problem:    Schizoaffective disorder, bipolar type (HCC)  Active Problems:    GERD (gastroesophageal reflux disease)    Medical clearance for psychiatric admission    Tobacco abuse    T wave inversion in EKG    Chronic idiopathic constipation    Vitamin B 12 deficiency    Vitamin D deficiency    Plan:   Restart Haldol 10mg BID and Zyprexa 20mg at bedtime for psychotic symptoms   Restart Lexapro 10mg daily for depressive symptoms   Restart Lithium 600mg at bedtime for mood stabilization   Restart Remeron 30mg at bedtime for depressive symptoms   Melatonin 3mg at bedtime to help with sleep       Current Facility-Administered Medications   Medication Dose Route Frequency Provider Last Rate    acetaminophen  650 mg Oral Q6H PRN Yasmin Harvey MD      acetaminophen  650 mg Oral Q4H PRN Yasmin Harvey MD      acetaminophen  975 mg Oral Q6H PRN Yasmin Harvey MD      aluminum-magnesium hydroxide-simethicone  30 mL Oral Q4H PRN Yasmin Harvey MD      haloperidol lactate  2.5 mg Intramuscular Q4H PRN Max 4/day Yasmin Harvey MD      And    LORazepam  1 mg Intramuscular Q4H PRN Max 4/day Yasmin Harvey MD      And    benztropine  0.5 mg Intramuscular Q4H PRN Max 4/day Yasmin Harvey MD      haloperidol lactate  5 mg Intramuscular Q4H PRN Max 4/day Yasmin Harvey MD      And    LORazepam  2 mg Intramuscular Q4H PRN Max 4/day Yasmin Harvey MD      And    benztropine  1 mg Intramuscular Q4H PRN Max 4/day Yasmin Harvey MD      benztropine  0.5 mg Oral BID La Flores MD      benztropine  1 mg Oral Q4H PRN Max 6/day Yasmin Harvey MD      bisacodyl  10 mg Rectal Daily PRN Yasmin Harvey MD      cholecalciferol  2,000 Units Oral Daily HOLLI Monge      cyanocobalamin  1,000 mcg Oral Daily Eileen Holliday  "Mikey, HOLLI      hydrOXYzine HCL  50 mg Oral Q6H PRN Max 4/day Yasmin Harvey MD      Or    diphenhydrAMINE  50 mg Intramuscular Q6H PRN Yasmin Harvey MD      escitalopram  10 mg Oral Daily La Flores MD      haloperidol  1 mg Oral Q6H PRN Yasmin Harvey MD      haloperidol  10 mg Oral BID La Flores MD      haloperidol  2.5 mg Oral Q4H PRN Max 4/day Yasmin Harvey MD      haloperidol  5 mg Oral Q4H PRN Max 4/day Yasmin Harvey MD      hydrOXYzine HCL  100 mg Oral Q6H PRN Max 4/day Yasmin Harvey MD      Or    LORazepam  2 mg Intramuscular Q6H PRN Yasmin Harvey MD      hydrOXYzine HCL  25 mg Oral Q6H PRN Max 4/day Yasmin Harvey MD      lithium carbonate  600 mg Oral HS La Flores MD      melatonin  3 mg Oral HS Yasmin Harvey MD      mirtazapine  30 mg Oral HS La Flores MD      nicotine polacrilex  4 mg Oral Q2H PRN Yasmin Harvey MD      OLANZapine  20 mg Oral HS La Flores MD      polyethylene glycol  17 g Oral Daily PRN Yasmin Harvey MD      [START ON 11/16/2023] polyethylene glycol  17 g Oral Daily HOLLI oMnge      senna-docusate sodium  1 tablet Oral Daily PRN Yasmin Harvey MD      traZODone  50 mg Oral HS PRN Yasmin Harvey MD       Risks, benefits and possible side effects of Medications:   Risks, benefits, and possible side effects of medications explained to patient and patient verbalizes understanding.      Chief Complaint: auditory hallucinations and suicidal ideation    History of Present Illness     Alberto is a 28 y.o. male with a history of Schizoaffective Disorder that presented to the ED with auditory hallucinations, depression and suicidal ideation. He was admitted to psychiatric unit on a voluntarily 201 commitment basis due to psychosis.    Patient reports auditory hallucinations for the past year. He reports \"They say that I'm going to hell and that they will kill my family\". He also reported " progressively worsening depression symptoms for the past week. Symptoms include decreased sleep, decreased appetite, AH and persecutory delusions. He endorses suicidal ideation without a plan. Patient denies self-harm behavior and past suicide attempts. Denies homicidal ideation and visual hallucinations. Patient denies any recent life stressors and reports medication compliance.     On initial evaluation, Alberto seems depressed with a flat affect and poor eye contact. He has scant speech. He endorsed auditory hallucinations and SI.     Psychiatric Review Of Systems:  sleep: yes, decreased  appetite changes: yes, decreased  weight changes: no  energy/anergy: yes, decreased  interest/pleasure/anhedonia: yes, decreased  somatic symptoms: no  anxiety/panic: yes  aimee: yes, history of aimee  guilty/hopeless: yes  self injurious behavior/risky behavior: no    Historical Information     Past Psychiatric History:   Inpatient Treatment: 6 previous admissions at [facility], [facility] in [place] and other institutions  Outpatient Treatment: Psychiatric treatment and therapy services at [facility]  Past Suicide Attempts: patient denies; per chart review, possible attempt in February 2022  Past Violent Behavior: no  Past Psychiatric Medication Trials: Lexapro, Zoloft, Haldol, Lithium, Zyprexa, Cogentin, Risperdal, Trazodone    Substance Abuse History:  E-Cigarette/Vaping    E-Cigarette Use Current Some Day User       E-Cigarette/Vaping Substances    Nicotine Yes        Social History       Tobacco History       Smoking Status  Every Day Smoking Frequency  0.25 packs/day Smoking Tobacco Type  Cigarettes      Smokeless Tobacco Use  Never      Tobacco Comments  Pt reports he does not want to quit and does not want information. He declines Quitline information              Alcohol History       Alcohol Use Status  Not Currently Comment  pt reports he does not drink              Drug Use        Drug Use Status  Not Currently              Sexual Activity       Sexually Active  Not Currently              Activities of Daily Living    Not Asked                 Additional Substance Use Detail       Questions Responses    Problems Due to Past Use of Alcohol? No    Problems Due to Past Use of Substances? No    Substance Use Assessment Denies substance use within the past 12 months    Alcohol Use Frequency Denies use in past 12 months    Cannabis frequency Never used    Comment:  Never used on 6/28/2023     Heroin Frequency Denies use in past 12 months    Cocaine frequency Never used    Comment:  Never used on 6/28/2023     Crack Cocaine Frequency Denies use in past 12 months    Methamphetamine Frequency Denies use in past 12 months    Narcotic Frequency Denies use in past 12 months    Benzodiazepine Frequency Denies use in past 12 months    Amphetamine frequency Denies use in past 12 months    Barbituate Frequency Denies use use in past 12 months    Inhalant frequency Never used    Comment:  Never used on 6/28/2023     Hallucinogen frequency Never used    Comment:  Never used on 6/28/2023     Ecstasy frequency Never used    Comment:  Never used on 6/28/2023     Other drug frequency Never used    Comment:  Never used on 6/28/2023     Opiate frequency Denies use in past 12 months    Last reviewed by Endy Frank RN on 11/14/2023          I have assessed this patient for substance use within the past 12 months    Family Psychiatric History:   Psychiatric Illness: father: schizophrenia, brother: schizophrenia, brother: schizophrenia, sister: schizophrenia  Substance Abuse: no family history of substance abuse   Suicide Attempts: brother: completed suicide    Social History:  Education: high school diploma/GED  Learning Disabilities:  none  Marital history: single  Children: none  Living arrangement, social support:  lives with aunt.  Occupational History: worked as dispatcher previously, currently unemployed, on  disability   Functioning Relationships: aunt and sisters are supportive  Other Pertinent History: none  Legal History: none   History: none      Traumatic History:   Abuse:  none  Other Traumatic Events:  none    Past Medical History:   Diagnosis Date    Anemia     Chronic idiopathic constipation     GERD (gastroesophageal reflux disease)     Schizoaffective disorder (HCC)     Syringoma     T wave inversion in EKG        Medical Review Of Systems:  Pertinent items are noted in HPI.    Meds/Allergies     Allergies   Allergen Reactions    Pollen Extract Sneezing       Objective   Vital signs in last 24 hours:  Temp:  [97.8 °F (36.6 °C)-98.9 °F (37.2 °C)] 97.8 °F (36.6 °C)  HR:  [66-89] 66  Resp:  [17-20] 18  BP: (121-174)/() 128/83    No intake or output data in the 24 hours ending 11/15/23 1211    Mental Status Evaluation:  Appearance:  dressed in hospital attire   Behavior:  guarded and limited eye contact   Speech:  scant   Mood:  depressed   Affect:  flat   Language: naming objects and repeating phrases   Thought Process:  concrete   Associations: concrete associations   Thought Content:  delusions  persecutory   Perceptual Disturbances: Auditory hallucinations without commands and no visual hallucinations   Risk Potential: Suicidal Ideations without plan, chuckie for safety in the unit  Homicidal Ideations none  Potential for Aggression No   Sensorium:  person, place, and time/date   Memory:  recent and remote memory grossly intact   Consciousness:  alert and awake    Attention: attention span appeared shorter than expected for age   Intellect: within normal limits   Fund of Knowledge: normal   Insight:  impaired   Judgment: impaired   Muscle Strength:  Muscle Tone: normal   normal   Gait/Station: normal gait/station and normal balance   Motor Activity: no abnormal movements       Code Status: Level 1 - Full Code  Advance Directive and Living Will:      Power of :    POLST:      Patient  Strengths/Assets: cooperative, compliant with medication, family ties, patient is on a voluntary commitment    Patient Barriers/Limitations: difficulty adapting, poor insight

## 2023-11-15 NOTE — TREATMENT PLAN
TREATMENT PLAN REVIEW - Behavioral [facility] Alberto Berumen 28 y.o. 1995 male MRN: [id number]    [facility] 3P BEHAVIORAL HLTH Room / Bed: [facility] 383/[facility] 383-02 Encounter: [id number]        Admit Date/Time:  11/14/2023 10:44 PM    Treatment Team: Attending Provider: La Flores MD; Care Manager: Agustina Cota LPC; Patient Care Technician: Ashu James; Registered Nurse: Verona Wall RN; Patient Care Assistant: Sonia Thurston; Recreational Therapist: Renetta Jose; Nursing Student: Eve Knapp; Patient Care Assistant: Elen Price; Advanced Practitioner: HOLLI Monge;  Care Manager: Breanna Farley    Diagnosis: Principal Problem:    Schizoaffective disorder, bipolar type (HCC)  Active Problems:    GERD (gastroesophageal reflux disease)    Medical clearance for psychiatric admission    Tobacco abuse    T wave inversion in EKG    Chronic idiopathic constipation    Vitamin B 12 deficiency    Vitamin D deficiency      Patient Strengths/Assets: negotiates basic needs, patient is on a voluntary commitment      Patient Barriers/Limitations: chronic mental illness, difficulty adapting    Short Term Goals: decrease in depressive symptoms, decrease in psychotic symptoms, sleep improvement, tolerate medications, mood stabilization    Long Term Goals: resolution of depressive symptoms, resolution of psychotic symptoms, adequate self care, adequate sleep, adequate appetite    Progress Towards Goals: restarting psychiatric medications as prescribed    Recommended Treatment: medication management, patient medication education, group therapy, milieu therapy, continued Behavioral Health psychiatric evaluation/assessment process     Treatment Frequency: daily medication monitoring, group and milieu therapy daily, monitoring through interdisciplinary rounds, monitoring through weekly patient care conferences    Expected Discharge Date: 10 days -  11/25/2023    Discharge Plan: referral for outpatient medication management with a psychiatrist, referral for outpatient psychotherapy, return to previous living arrangement    Treatment Plan Created/Updated By: La Flores MD

## 2023-11-15 NOTE — NURSING NOTE
Pt requested and given PRN trazodone 50mg for sleep at 2320. Pt is currently in bed resting with no signs of distress, effective.

## 2023-11-15 NOTE — NURSING NOTE
"Pt is a 201 transferred from [facility]. Pt upon arrival is calm, cooperative with admission process, and pleasant. AAOx4. Allergies to pollen. When asked \"why are you here?\" Pt responded \"Hearing voices. Suicidal thoughts\". Pt reports increased depression, anxiety, paranoia and AH. Pt reported \"The voices are saying that I'm going to hell and to hurt people\". Pt reports feeling safe on the unit. Agrees to let staff know if AH or symptoms worsen. C-SSRS lifetime moderate, current score low. Pt reports lifetime of passive SI without plan. Pt reports no hx of suicide attempts. Denies substance and alcohol use, UDS negative in ER. Smokes 0.25 packs of cigarettes a day. Denies hx of abuse/ trauma. Pt reports good nutrition and oral intake. No hx of falls. Pt reports mother and aunt are good support system, currently resides with them. Pt was oriented to the unit, given food and fluids. Q7 checks initiated. Pt denies unmet needs/ concerns. Given HS medication and went to bed.    "

## 2023-11-16 PROCEDURE — 99233 SBSQ HOSP IP/OBS HIGH 50: CPT | Performed by: PSYCHIATRY & NEUROLOGY

## 2023-11-16 RX ADMIN — CHOLECALCIFEROL TAB 25 MCG (1000 UNIT) 2000 UNITS: 25 TAB at 10:00

## 2023-11-16 RX ADMIN — MELATONIN TAB 3 MG 3 MG: 3 TAB at 21:22

## 2023-11-16 RX ADMIN — BENZTROPINE MESYLATE 0.5 MG: 0.5 TABLET ORAL at 17:07

## 2023-11-16 RX ADMIN — ESCITALOPRAM OXALATE 10 MG: 10 TABLET, FILM COATED ORAL at 10:00

## 2023-11-16 RX ADMIN — OLANZAPINE 20 MG: 10 TABLET, FILM COATED ORAL at 21:22

## 2023-11-16 RX ADMIN — BENZTROPINE MESYLATE 0.5 MG: 0.5 TABLET ORAL at 10:00

## 2023-11-16 RX ADMIN — HALOPERIDOL 10 MG: 10 TABLET ORAL at 17:07

## 2023-11-16 RX ADMIN — TRAZODONE HYDROCHLORIDE 50 MG: 50 TABLET ORAL at 21:23

## 2023-11-16 RX ADMIN — LITHIUM CARBONATE 600 MG: 300 TABLET, EXTENDED RELEASE ORAL at 21:22

## 2023-11-16 RX ADMIN — MIRTAZAPINE 30 MG: 30 TABLET, FILM COATED ORAL at 21:22

## 2023-11-16 RX ADMIN — CYANOCOBALAMIN TAB 1000 MCG 1000 MCG: 1000 TAB at 10:00

## 2023-11-16 RX ADMIN — HALOPERIDOL 10 MG: 10 TABLET ORAL at 10:00

## 2023-11-16 NOTE — PROGRESS NOTES
This therapist gave patient the Self Assessment Admission form to complete, sign and return to this therapist.    Self Assessment for was completed, signed and returned to this therapist.

## 2023-11-16 NOTE — NURSING NOTE
"Patient is complaining of auditory hallucinations. Patient is lying in bed talking to self. Explained to patient that Haldol 10mg can be given as early as 1700, patient states \"it's okay\" to wait until then for Haldol, since it is scheduled. Patient agreed to approach this RN with any concerns about hallucinations.  "

## 2023-11-16 NOTE — PROGRESS NOTES
"Progress Note - Behavioral Health     Alberto Berumen 28 y.o. male MRN: [id number]   Unit/Bed#: U 383-02 Encounter: [id number]    Behavior over the last 24 hours: unchanged.     Alberto continues to report auditory hallucinations with derogatory comments \"voices are saying that they are going to kill me and my family\". He also still has persecutory delusions and still reports suicidal thoughts without plan. He feels safe on the inpatient unit. Poor eye contact, flat affect. Seclusive to the room. Does not want to attend groups. Has been taking medications.    Sleep: normal  Appetite: decreased  Medication side effects: No   ROS: no complaints, denies any shortness of breath, chest pain, or abdominal pain, all other systems are negative    Mental Status Evaluation:    Appearance:  disheveled, dressed in hospital attire   Behavior:  cooperative, guarded, poor eye contact, psychomotor retardation   Speech:  scant, soft   Mood:  depressed   Affect:  flat   Thought Process:  concrete   Associations: concrete associations   Thought Content:  persecutory delusions   Perceptual Disturbances: auditory hallucinations of \"voices saying that they are going to kill me and my family\", no visual hallucinations   Risk Potential: Suicidal ideation - Yes, without plan, contracts for safety on the unit  Homicidal ideation - None  Potential for aggression - No   Sensorium:  oriented to person, place, and time/date   Memory:  recent and remote memory grossly intact   Consciousness:  alert and awake   Attention/Concentration: poor concentration and poor attention span   Insight:  poor   Judgment: poor   Gait/Station: normal gait/station, normal balance   Motor Activity: no abnormal movements     Vital signs in last 24 hours:    Temp:  [97.9 °F (36.6 °C)-98.1 °F (36.7 °C)] 97.9 °F (36.6 °C)  HR:  [70-85] 71  Resp:  [16-17] 16  BP: (124-149)/(67-86) 124/82    Laboratory results: I have personally reviewed all pertinent laboratory/tests " results    Iron Study   Lab Results   Component Value Date    FERRITIN 56 11/15/2023    CONCFE 24 11/15/2023    TIBC 302 11/15/2023    IRON 71 11/15/2023   Urinalysis   Lab Results   Component Value Date    COLORU Agatha (A) 11/15/2023    CLARITYU Clear 11/15/2023    SPECGRAV 1.020 11/15/2023    PHUR 6.0 11/15/2023    LEUKOCYTESUR 25.0 (A) 11/15/2023    NITRITE Negative 11/15/2023    PROTEIN UA 15 (Trace) (A) 11/15/2023    GLUCOSEU Negative 11/15/2023    KETONESU Negative 11/15/2023    UROBILINOGEN 4.0 (A) 11/15/2023    BILIRUBINUR Negative 11/15/2023    BLOODU Negative 11/15/2023    RBCUA None Seen 11/15/2023    WBCUA 2-4 11/15/2023    EPIS Occasional 11/15/2023    BACTERIA Occasional 11/15/2023   ECG   Lab Results   Component Value Date    VENTRATE 62 11/15/2023    ATRIALRATE 62 11/15/2023    PRINT 142 11/15/2023    QRSDINT 92 11/15/2023    QTINT 392 11/15/2023    QTCINT 397 11/15/2023    PAXIS 49 11/15/2023    QRSAXIS 45 11/15/2023    TWAVEAXIS 18 11/15/2023       Suicide/Homicide Risk Assessment:    Risk of Harm to Self:   Nursing Suicide Risk Assessment Last 24 hours: C-SSRS Risk (Since Last Contact)  Calculated C-SSRS Risk Score (Since Last Contact): No Risk Indicated  Current Specific Risk Factors include: has suicidal ideation without a plan, mental illness diagnosis, current depressive symptoms, current psychotic symptoms, presence of hallucinations, presence of delusions, poor self care  Protective Factors: ability to communicate with staff on the unit, able to contract for safety on the unit, taking medications as ordered on the unit  Based on today's assessment, Alberto presents the following risk of harm to self: low    Risk of Harm to Others:  Nursing Homicide Risk Assessment: Violence Risk to Others: Denies within past 6 months  Based on today's assessment, Alberto presents the following risk of harm to others: low    The following interventions are recommended: behavioral checks every 7 minutes,  continued hospitalization on locked unit    Progress Toward Goals: no significant progress, still very depressed, still reports suicidal thoughts, still has psychotic symptoms    Assessment/Plan   Principal Problem:    Schizoaffective disorder, bipolar type (HCC)  Active Problems:    GERD (gastroesophageal reflux disease)    Medical clearance for psychiatric admission    Tobacco abuse    T wave inversion in EKG    Chronic idiopathic constipation    Vitamin B 12 deficiency    Vitamin D deficiency      Recommended Treatment:     Planned medication and treatment changes:    All current active medications have been reviewed  Encourage group therapy, milieu therapy and occupational therapy  Behavioral Health checks every 7 minutes  Increase Haldol to 10 mg bid to help with psychotic symptoms  Recheck Lithium level and BMP on 11/18/2023    Continue all other medications:    Current Facility-Administered Medications   Medication Dose Route Frequency Provider Last Rate    acetaminophen  650 mg Oral Q6H PRN Yasmin Harvey MD      acetaminophen  650 mg Oral Q4H PRN Yasmin Harvey MD      acetaminophen  975 mg Oral Q6H PRN Yasmin Harvey MD      aluminum-magnesium hydroxide-simethicone  30 mL Oral Q4H PRN Yasmin Harvey MD      haloperidol lactate  2.5 mg Intramuscular Q4H PRN Max 4/day Yasmin Harvey MD      And    LORazepam  1 mg Intramuscular Q4H PRN Max 4/day Yasmin Harvey MD      And    benztropine  0.5 mg Intramuscular Q4H PRN Max 4/day Yasmin Harvey MD      haloperidol lactate  5 mg Intramuscular Q4H PRN Max 4/day Yasmin Harvey MD      And    LORazepam  2 mg Intramuscular Q4H PRN Max 4/day Yasmin Harvey MD      And    benztropine  1 mg Intramuscular Q4H PRN Max 4/day Yasmin Harvey MD      benztropine  0.5 mg Oral BID La Flores MD      benztropine  1 mg Oral Q4H PRN Max 6/day Yasmin Harvey MD      bisacodyl  10 mg Rectal Daily PRN Yasmin Harvey MD      cholecalciferol   2,000 Units Oral Daily HOLLI Monge      cyanocobalamin  1,000 mcg Oral Daily HOLLI Monge      hydrOXYzine HCL  50 mg Oral Q6H PRN Max 4/day Yasmin Harvey MD      Or    diphenhydrAMINE  50 mg Intramuscular Q6H PRN Yasmin Harvey MD      escitalopram  10 mg Oral Daily La Flores MD      haloperidol  1 mg Oral Q6H PRN Yasmin Harvey MD      haloperidol  10 mg Oral BID La Flores MD      haloperidol  2.5 mg Oral Q4H PRN Max 4/day Yasmin Harvey MD      haloperidol  5 mg Oral Q4H PRN Max 4/day Yasmin Harvey MD      hydrOXYzine HCL  100 mg Oral Q6H PRN Max 4/day Yasmin Harvey MD      Or    LORazepam  2 mg Intramuscular Q6H PRN Yasmin Harvey MD      hydrOXYzine HCL  25 mg Oral Q6H PRN Max 4/day Yasmin Harvey MD      lithium carbonate  600 mg Oral HS La Flores MD      melatonin  3 mg Oral HS Yasmin Harvey MD      mirtazapine  30 mg Oral HS La Flores MD      nicotine polacrilex  4 mg Oral Q2H PRN Yasmin Harvey MD      OLANZapine  20 mg Oral HS La Flores MD      polyethylene glycol  17 g Oral Daily PRN Yasmin Harvey MD      polyethylene glycol  17 g Oral Daily HOLLI Monge      senna-docusate sodium  1 tablet Oral Daily PRN Yasmin Harvey MD      traZODone  50 mg Oral HS PRN Yasmin Harvey MD       Risks / Benefits of Treatment:    Risks, benefits, and possible side effects of medications explained to patient. Patient has limited understanding of risks and benefits of treatment at this time, but agrees to take medications as prescribed.    Counseling / Coordination of Care:    Patient's progress discussed with staff in treatment team meeting.  Medications, treatment progress and treatment plan reviewed with patient.  Medication changes discussed with patient.    La Flores MD 11/16/23

## 2023-11-16 NOTE — NURSING NOTE
Patient is isolative to bedroom. Denies SI, HI, SIB, auditory and visual hallucinations at this time, though seemed uninterested in answering questions. Med compliant. Encouraged to go to group and be more visible on the unit. Denies any unmet needs at this time. Q7 safety checks to continue.

## 2023-11-16 NOTE — PLAN OF CARE
Problem: PSYCHOSIS  Goal: Will report no hallucinations or delusions  Description: Interventions:  - Administer medication as  ordered  - Every waking shifts and PRN assess for the presence of hallucinations and or delusions  - Assist with reality testing to support increasing orientation  - Assess if patient's hallucinations or delusions are encouraging self-harm or harm to others and intervene as appropriate  Outcome: Progressing     Problem: ANXIETY  Goal: Will report anxiety at manageable levels  Description: INTERVENTIONS:  - Administer medication as ordered  - Teach and encourage coping skills  - Provide emotional support  - Assess patient/family for anxiety and ability to cope  Outcome: Progressing     Problem: Depression  Goal: Treatment Goal: Demonstrate behavioral control of depressive symptoms, verbalize feelings of improved mood/affect, and adopt new coping skills prior to discharge  Outcome: Progressing  Goal: Verbalize thoughts and feelings  Description: Interventions:  - Assess and re-assess patient's level of risk   - Engage patient in 1:1 interactions, daily, for a minimum of 15 minutes   - Encourage patient to express feelings, fears, frustrations, hopes   Outcome: Progressing  Goal: Refrain from isolation  Description: Interventions:  - Develop a trusting relationship   - Encourage socialization   Outcome: Progressing  Goal: Refrain from self-neglect  Outcome: Progressing

## 2023-11-16 NOTE — NURSING NOTE
"PT observed in his room, responding to self, with voices telling him \"You will go to hell\". Cooperative and calm upon approach. Denies SI/HI/VH. Asked if there were other voices, PT denies. Compliant with HS medications and unit routine at this time no unmet needs/concerns.   "

## 2023-11-16 NOTE — PROGRESS NOTES
11/16/23 1203   Team Meeting   Meeting Type Daily Rounds   Team Members Present   Team Members Present Physician;Nurse;   Physician Team Member Mark   Nursing Team Member Gary   Care Management Team Member Margie Camarillo   Patient/Family Present   Patient Present No   Patient's Family Present No     Patient currently holds 201 status. Patient is complaint with medications and meals. Patient will begin taking lexapro 10mg daily, Hadol 10mg daily, Lithium 600mg HS, Remeron 30mg HS, Melatonin 3mg HS, and Zyprexa 20mg HS. Patient discharge date and plan is to be determined.

## 2023-11-17 LAB
EST. AVERAGE GLUCOSE BLD GHB EST-MCNC: 94 MG/DL
HBA1C MFR BLD: 4.9 %

## 2023-11-17 PROCEDURE — 99233 SBSQ HOSP IP/OBS HIGH 50: CPT | Performed by: PSYCHIATRY & NEUROLOGY

## 2023-11-17 RX ADMIN — BENZTROPINE MESYLATE 0.5 MG: 0.5 TABLET ORAL at 08:32

## 2023-11-17 RX ADMIN — BENZTROPINE MESYLATE 0.5 MG: 0.5 TABLET ORAL at 17:31

## 2023-11-17 RX ADMIN — LITHIUM CARBONATE 600 MG: 300 TABLET, EXTENDED RELEASE ORAL at 21:11

## 2023-11-17 RX ADMIN — HALOPERIDOL 10 MG: 10 TABLET ORAL at 17:31

## 2023-11-17 RX ADMIN — OLANZAPINE 20 MG: 10 TABLET, FILM COATED ORAL at 21:11

## 2023-11-17 RX ADMIN — MIRTAZAPINE 45 MG: 30 TABLET, FILM COATED ORAL at 21:11

## 2023-11-17 RX ADMIN — HALOPERIDOL 10 MG: 10 TABLET ORAL at 08:32

## 2023-11-17 RX ADMIN — CHOLECALCIFEROL TAB 25 MCG (1000 UNIT) 2000 UNITS: 25 TAB at 08:32

## 2023-11-17 RX ADMIN — ESCITALOPRAM OXALATE 10 MG: 10 TABLET, FILM COATED ORAL at 08:32

## 2023-11-17 RX ADMIN — CYANOCOBALAMIN TAB 1000 MCG 1000 MCG: 1000 TAB at 08:32

## 2023-11-17 RX ADMIN — MELATONIN TAB 3 MG 3 MG: 3 TAB at 21:11

## 2023-11-17 NOTE — PROGRESS NOTES
11/17/23 [id number]   Team Meeting   Meeting Type Daily Rounds   Team Members Present   Team Members Present Physician;Nurse;   Physician Team Member Mark   Nursing Team Member [facility] Team Member Miguelito Camarillo   Patient/Family Present   Patient Present No   Patient's Family Present No     Patient currently holds 201 status. Patient is complaint with medications and meals. Patient will begin taking increased dose of Remeron 45 mg. Patient will continue taking lexapro 10mg daily, Hadol 10mg daily, Lithium 600mg HS, Melatonin 3mg HS, and Zyprexa 20mg HS. Patient discharge date and plan is to be determined.

## 2023-11-17 NOTE — NURSING NOTE
Denies SI/HI/SIB. Endorses AH of voices threatening their life. Appears internally preoccupied but has not been witness RTIS. Reports slight improvement in mood from yesterday but continues to note depression. Calm and in behavioral control. Med compliant. Mostly isolative to room. Wearing own clothes. Offers no further complaints.

## 2023-11-17 NOTE — PROGRESS NOTES
"Progress Note - Behavioral Health     Alberto Berumen 28 y.o. male MRN: [id number]   Unit/Bed#: -02 Encounter: [id number]    Behavior over the last 24 hours: unchanged.     Alberto still feels depressed, helpless, and hopeless. He still reports auditory hallucinations with derogatory comments \"voices saying that I am going to hell\". He has been isolative to his room, seems preoccupied and appears often responding to internal stimuli. He still has suicidal thoughts without a plan, but feels safe in the hospital. Seclusive to the room. Does not want to attend groups. Compliant with medications.    Sleep: normal  Appetite: improving  Medication side effects: No   ROS: no complaints, denies any shortness of breath, chest pain, or abdominal pain, all other systems are negative    Mental Status Evaluation:    Appearance:  disheveled, wearing hospital clothes   Behavior:  cooperative, guarded, no eye contact, psychomotor retardation   Speech:  scant, soft   Mood:  depressed   Affect:  flat   Thought Process:  concrete   Associations: concrete associations   Thought Content:  persecutory delusions   Perceptual Disturbances: auditory hallucinations with derogatory comments of \"voices saying that I am going to hell\", no visual hallucinations   Risk Potential: Suicidal ideation - Yes, without plan, contracts for safety on the unit  Homicidal ideation - None  Potential for aggression - No   Sensorium:  oriented to person, place, and time/date   Memory:  recent and remote memory grossly intact   Consciousness:  alert and awake   Attention/Concentration: poor concentration and poor attention span   Insight:  poor   Judgment: poor   Gait/Station: normal gait/station, normal balance   Motor Activity: no abnormal movements     Vital signs in last 24 hours:    Temp:  [97.5 °F (36.4 °C)-97.7 °F (36.5 °C)] 97.5 °F (36.4 °C)  HR:  [] 59  Resp:  [13-18] 13  BP: (116-141)/(73-92) 116/73    Laboratory results: I have " personally reviewed all pertinent laboratory/tests results    Results from the past 24 hours: No results found for this or any previous visit (from the past 24 hour(s)).    Suicide/Homicide Risk Assessment:    Risk of Harm to Self:   Nursing Suicide Risk Assessment Last 24 hours: C-SSRS Risk (Since Last Contact)  Calculated C-SSRS Risk Score (Since Last Contact): No Risk Indicated  Current Specific Risk Factors include: has suicidal ideation without a plan, mental illness diagnosis, current depressive symptoms, presence of hallucinations, presence of delusions  Protective Factors: ability to communicate with staff on the unit, able to contract for safety on the unit, taking medications as ordered on the unit  Based on today's assessment, Alberto presents the following risk of harm to self: low    Risk of Harm to Others:  Nursing Homicide Risk Assessment: Violence Risk to Others: Denies within past 6 months  Based on today's assessment, Alberto presents the following risk of harm to others: low    The following interventions are recommended: behavioral checks every 7 minutes, continued hospitalization on locked unit    Progress Toward Goals: no improvement, still depressed, still psychotic, poor insight, poor motivation, still reports suicidal thoughts    Assessment/Plan   Principal Problem:    Schizoaffective disorder, bipolar type (HCC)  Active Problems:    GERD (gastroesophageal reflux disease)    Medical clearance for psychiatric admission    Tobacco abuse    T wave inversion in EKG    Chronic idiopathic constipation    Vitamin B 12 deficiency    Vitamin D deficiency      Recommended Treatment:     Planned medication and treatment changes:    All current active medications have been reviewed  Encourage group therapy, milieu therapy and occupational therapy  Behavioral Health checks every 7 minutes  Increase Remeron   Recheck Lithium level and BMP tomorrow    Continue all other medications:    Current  Facility-Administered Medications   Medication Dose Route Frequency Provider Last Rate    acetaminophen  650 mg Oral Q6H PRN Yasmin Harvey MD      acetaminophen  650 mg Oral Q4H PRN Yasmin Harvey MD      acetaminophen  975 mg Oral Q6H PRN Yasmin Harvey MD      aluminum-magnesium hydroxide-simethicone  30 mL Oral Q4H PRN Yasmin Harvey MD      haloperidol lactate  2.5 mg Intramuscular Q4H PRN Max 4/day Yasmin Harvey MD      And    LORazepam  1 mg Intramuscular Q4H PRN Max 4/day Yasmin Harvey MD      And    benztropine  0.5 mg Intramuscular Q4H PRN Max 4/day Yasmin Harvey MD      haloperidol lactate  5 mg Intramuscular Q4H PRN Max 4/day Yasmin Harvey MD      And    LORazepam  2 mg Intramuscular Q4H PRN Max 4/day Yasmin Harvey MD      And    benztropine  1 mg Intramuscular Q4H PRN Max 4/day Yasmin Harvey MD      benztropine  0.5 mg Oral BID La Flores MD      benztropine  1 mg Oral Q4H PRN Max 6/day Yasmin Harvey MD      bisacodyl  10 mg Rectal Daily PRN Yasmin Harvey MD      cholecalciferol  2,000 Units Oral Daily HOLLI Monge      cyanocobalamin  1,000 mcg Oral Daily HOLLI Monge      hydrOXYzine HCL  50 mg Oral Q6H PRN Max 4/day Yasmin Harvey MD      Or    diphenhydrAMINE  50 mg Intramuscular Q6H PRN Yasmin Harvey MD      escitalopram  10 mg Oral Daily La Flores MD      haloperidol  1 mg Oral Q6H PRN Yasmin Harvey MD      haloperidol  10 mg Oral BID La Flores MD      haloperidol  2.5 mg Oral Q4H PRN Max 4/day Yasmin Harvey MD      haloperidol  5 mg Oral Q4H PRN Max 4/day Yasmin Harvey MD      hydrOXYzine HCL  100 mg Oral Q6H PRN Max 4/day Yasmin Harvey MD      Or    LORazepam  2 mg Intramuscular Q6H PRN Yasmin Harvey MD      hydrOXYzine HCL  25 mg Oral Q6H PRN Max 4/day Yasmin Harvey MD      lithium carbonate  600 mg Oral HS La Flores MD      melatonin  3 mg Oral HS Yasmin  GASPER Harvey MD      mirtazapine  45 mg Oral HS La Flores MD      nicotine polacrilex  4 mg Oral Q2H PRN Yasmin Harvey MD      OLANZapine  20 mg Oral HS La Flores MD      polyethylene glycol  17 g Oral Daily PRN Yasmin Harvey MD      polyethylene glycol  17 g Oral Daily HOLLI Monge      senna-docusate sodium  1 tablet Oral Daily PRN Yasmin Harvey MD      traZODone  50 mg Oral HS PRN Yasmin Harvey MD       Risks / Benefits of Treatment:    Risks, benefits, and possible side effects of medications explained to patient. Patient has limited understanding of risks and benefits of treatment at this time, but agrees to take medications as prescribed.    Counseling / Coordination of Care:    Patient's progress discussed with staff in treatment team meeting.  Medications, treatment progress and treatment plan reviewed with patient.  Medication changes discussed with patient.    La Flores MD 11/17/23

## 2023-11-17 NOTE — PLAN OF CARE
Problem: ANXIETY  Goal: Will report anxiety at manageable levels  Description: INTERVENTIONS:  - Administer medication as ordered  - Teach and encourage coping skills  - Provide emotional support  - Assess patient/family for anxiety and ability to cope  Outcome: Progressing     Problem: Depression  Goal: Treatment Goal: Demonstrate behavioral control of depressive symptoms, verbalize feelings of improved mood/affect, and adopt new coping skills prior to discharge  Outcome: Progressing  Goal: Verbalize thoughts and feelings  Description: Interventions:  - Assess and re-assess patient's level of risk   - Engage patient in 1:1 interactions, daily, for a minimum of 15 minutes   - Encourage patient to express feelings, fears, frustrations, hopes   Outcome: Progressing  Goal: Refrain from isolation  Description: Interventions:  - Develop a trusting relationship   - Encourage socialization   Outcome: Progressing  Goal: Refrain from self-neglect  Outcome: Progressing     Problem: PSYCHOSIS  Goal: Will report no hallucinations or delusions  Description: Interventions:  - Administer medication as  ordered  - Every waking shifts and PRN assess for the presence of hallucinations and or delusions  - Assist with reality testing to support increasing orientation  - Assess if patient's hallucinations or delusions are encouraging self-harm or harm to others and intervene as appropriate  Outcome: Not Progressing     Problem: Ineffective Coping  Goal: Participates in unit activities  Description: Interventions:  - Provide therapeutic environment   - Provide required programming   - Redirect inappropriate behaviors   Outcome: Not Progressing

## 2023-11-17 NOTE — NURSING NOTE
"Pt is calm and cooperative. Pt is isolative to room and self, no interactions with peers. Pt reports CAH, stated \"The voices are telling me I'm going to hell and to hurt people\". Pt denies SI, HI, and pain. Does not intend to act on the voices. Compliant with HS medication and snack. Requested and given PRN trazodone 50mg for insomnia at 2123. One hour later pt appears to be resting bed, effective. Denied further unmet needs/ concerns.   "

## 2023-11-18 LAB
ANION GAP SERPL CALCULATED.3IONS-SCNC: 9 MMOL/L
BUN SERPL-MCNC: 10 MG/DL (ref 5–25)
CALCIUM SERPL-MCNC: 9.7 MG/DL (ref 8.4–10.2)
CHLORIDE SERPL-SCNC: 104 MMOL/L (ref 96–108)
CO2 SERPL-SCNC: 26 MMOL/L (ref 21–32)
CREAT SERPL-MCNC: 1.07 MG/DL (ref 0.6–1.3)
GFR SERPL CREATININE-BSD FRML MDRD: 94 ML/MIN/1.73SQ M
GLUCOSE SERPL-MCNC: 119 MG/DL (ref 65–140)
LITHIUM SERPL-SCNC: 0.3 MMOL/L (ref 0.6–1.2)
POTASSIUM SERPL-SCNC: 4.3 MMOL/L (ref 3.5–5.3)
SODIUM SERPL-SCNC: 139 MMOL/L (ref 135–147)

## 2023-11-18 PROCEDURE — 80048 BASIC METABOLIC PNL TOTAL CA: CPT | Performed by: PSYCHIATRY & NEUROLOGY

## 2023-11-18 PROCEDURE — 86780 TREPONEMA PALLIDUM: CPT | Performed by: PSYCHIATRY & NEUROLOGY

## 2023-11-18 PROCEDURE — 80178 ASSAY OF LITHIUM: CPT | Performed by: PSYCHIATRY & NEUROLOGY

## 2023-11-18 PROCEDURE — 99232 SBSQ HOSP IP/OBS MODERATE 35: CPT | Performed by: STUDENT IN AN ORGANIZED HEALTH CARE EDUCATION/TRAINING PROGRAM

## 2023-11-18 RX ADMIN — HALOPERIDOL 10 MG: 10 TABLET ORAL at 17:07

## 2023-11-18 RX ADMIN — NICOTINE POLACRILEX 4 MG: 4 GUM, CHEWING ORAL at 16:35

## 2023-11-18 RX ADMIN — MELATONIN TAB 3 MG 3 MG: 3 TAB at 21:20

## 2023-11-18 RX ADMIN — CHOLECALCIFEROL TAB 25 MCG (1000 UNIT) 2000 UNITS: 25 TAB at 08:30

## 2023-11-18 RX ADMIN — CYANOCOBALAMIN TAB 1000 MCG 1000 MCG: 1000 TAB at 08:31

## 2023-11-18 RX ADMIN — MIRTAZAPINE 45 MG: 30 TABLET, FILM COATED ORAL at 21:21

## 2023-11-18 RX ADMIN — OLANZAPINE 20 MG: 10 TABLET, FILM COATED ORAL at 21:21

## 2023-11-18 RX ADMIN — LITHIUM CARBONATE 600 MG: 300 TABLET, EXTENDED RELEASE ORAL at 21:21

## 2023-11-18 RX ADMIN — BENZTROPINE MESYLATE 0.5 MG: 0.5 TABLET ORAL at 08:30

## 2023-11-18 RX ADMIN — HALOPERIDOL 10 MG: 10 TABLET ORAL at 08:31

## 2023-11-18 RX ADMIN — BENZTROPINE MESYLATE 0.5 MG: 0.5 TABLET ORAL at 17:07

## 2023-11-18 RX ADMIN — ESCITALOPRAM OXALATE 10 MG: 10 TABLET, FILM COATED ORAL at 08:31

## 2023-11-18 NOTE — PROGRESS NOTES
Progress Note - Behavioral Health   Alberto Berumen 28 y.o. male MRN: [id number]  Unit/Bed#: U 383-02 Encounter: [id number]    Assessment/Plan   Principal Problem:    Schizoaffective disorder, bipolar type (HCC)  Active Problems:    GERD (gastroesophageal reflux disease)    Medical clearance for psychiatric admission    Tobacco abuse    T wave inversion in EKG    Chronic idiopathic constipation    Vitamin B 12 deficiency    Vitamin D deficiency    Recommended Treatment:   Continue medications at current doses as below.  Encourage group therapy, milieu therapy and occupational therapy  All current active medications have been reviewed  Encourage group therapy, milieu therapy and occupational therapy  Behavioral Health checks every 7 minutes    ----------------------------------------      Subjective:   Per nursing report, patient has been isolative to his room.  He has been denying any auditory or visual hallucinations But is at times heard responding to internal stimuli.  He has been endorsing continued feelings of depression.  He has been medication compliant and has been without any acute behavioral issues. Patient reports feeling unchanged today.  He is calm and cooperative.  He reports that he is having continued auditory hallucinations but denies any visual hallucinations.  He denies any SI or HI.  Patient reports tolerating his medications fairly well.  Discussed that with recent increase in Remeron, would recommend monitoring at current doses for next day and patient was agreeable to this plan.    Behavior over the last 24 hours:  unchanged  Sleep: insomnia  Appetite: poor  Medication side effects: No  ROS: all other systems are negative    Mental Status Evaluation:  Appearance:  disheveled and marginal hygiene   Behavior:  cooperative and guarded   Speech:  soft and scant   Mood:  depressed   Affect:  blunted   Thought Process:  linear and goal directed   Associations: concrete associations   Thought  Content:  no overt delusions   Perceptual Disturbances: auditory hallucinations, denies visual hallucinations when asked, does not appear responding to internal stimuli   Risk Potential: Suicidal ideation - None  Homicidal ideation - None  Potential for aggression - Not at present   Sensorium:  oriented to person, place, and time/date   Memory:  recent and remote memory grossly intact   Consciousness:  alert and awake   Attention/Concentration: attention span and concentration are age appropriate   Insight:  limited   Judgment: limited   Gait/Station: normal gait/station, normal balance   Motor Activity: no abnormal movements     Medications: all current active meds have been reviewed.  Current Facility-Administered Medications   Medication Dose Route Frequency Provider Last Rate    acetaminophen  650 mg Oral Q6H PRN Yasmin Harvey MD      acetaminophen  650 mg Oral Q4H PRN Yasmin Harvey MD      acetaminophen  975 mg Oral Q6H PRN Yasmin Harvey MD      aluminum-magnesium hydroxide-simethicone  30 mL Oral Q4H PRN Yasmin Harvey MD      haloperidol lactate  2.5 mg Intramuscular Q4H PRN Max 4/day Yasmin Harvey MD      And    LORazepam  1 mg Intramuscular Q4H PRN Max 4/day Yasmin Harvey MD      And    benztropine  0.5 mg Intramuscular Q4H PRN Max 4/day Yasmin Harvey MD      haloperidol lactate  5 mg Intramuscular Q4H PRN Max 4/day Yasmin Harvey MD      And    LORazepam  2 mg Intramuscular Q4H PRN Max 4/day Yasmin Harvey MD      And    benztropine  1 mg Intramuscular Q4H PRN Max 4/day Yasmin Harvey MD      benztropine  0.5 mg Oral BID La Flores MD      benztropine  1 mg Oral Q4H PRN Max 6/day Yasmin Harvey MD      bisacodyl  10 mg Rectal Daily PRN Yasmin Harvey MD      cholecalciferol  2,000 Units Oral Daily HOLLI Monge      cyanocobalamin  1,000 mcg Oral Daily HOLLI Monge      hydrOXYzine HCL  50 mg Oral Q6H PRN Max 4/day Yasmin JACKMAN  MD Candice      Or    diphenhydrAMINE  50 mg Intramuscular Q6H PRN Yasmin Harvey MD      escitalopram  10 mg Oral Daily La Flores MD      haloperidol  1 mg Oral Q6H PRN Yasmin Harvey MD      haloperidol  10 mg Oral BID La Flores MD      haloperidol  2.5 mg Oral Q4H PRN Max 4/day Yasmin Harvey MD      haloperidol  5 mg Oral Q4H PRN Max 4/day Yasmin Harvey MD      hydrOXYzine HCL  100 mg Oral Q6H PRN Max 4/day Yasmin Harvey MD      Or    LORazepam  2 mg Intramuscular Q6H PRN Yasmin Harvey MD      hydrOXYzine HCL  25 mg Oral Q6H PRN Max 4/day Yasmin Harvey MD      lithium carbonate  600 mg Oral HS La Flores MD      melatonin  3 mg Oral HS Yasmin Harvey MD      mirtazapine  45 mg Oral HS La Flores MD      nicotine polacrilex  4 mg Oral Q2H PRN Yasmin Harvey MD      OLANZapine  20 mg Oral HS La Flores MD      polyethylene glycol  17 g Oral Daily PRN Yasmin Harvey MD      polyethylene glycol  17 g Oral Daily HOLLI Monge      senna-docusate sodium  1 tablet Oral Daily PRN Yasmin Harvey MD      traZODone  50 mg Oral HS PRN Yasmin Harvey MD         Labs: I have personally reviewed all pertinent laboratory/tests results  Most Recent Labs:   Lab Results   Component Value Date    WBC 10.15 11/15/2023    RBC 5.32 11/15/2023    HGB 12.8 11/15/2023    HCT 42.4 11/15/2023     11/15/2023    RDW 14.0 11/15/2023    NEUTROABS 6.25 11/15/2023    SODIUM 139 11/15/2023    K 3.9 11/15/2023     11/15/2023    CO2 26 11/15/2023    BUN 8 11/15/2023    CREATININE 0.92 11/15/2023    GLUC 97 11/15/2023    CALCIUM 9.1 11/15/2023    AST 22 11/15/2023    ALT 40 11/15/2023    ALKPHOS 69 11/15/2023    TP 6.5 11/15/2023    ALB 4.1 11/15/2023    TBILI 0.45 11/15/2023    CHOLESTEROL 169 11/15/2023    HDL 37 (L) 11/15/2023    TRIG 117 11/15/2023    LDLCALC 109 (H) 11/15/2023    NONHDLC 132 11/15/2023    LITHIUM 0.5 (L) 08/14/2023     [id number] 1.062 11/15/2023    SYPHILISAB Non-reactive 07/13/2023    HGBA1C 4.9 11/15/2023    EAG 94 11/15/2023       Progress Toward Goals: progressing    Risks / Benefits of Treatment:    Risks, benefits, and possible side effects of medications explained to patient and patient verbalizes understanding and agreement for treatment.    Counseling / Coordination of Care:    Patient's progress discussed with staff in treatment team meeting.  Medications, treatment progress and treatment plan reviewed with patient.    Guy Arroyo MD 11/18/23

## 2023-11-18 NOTE — NURSING NOTE
"Patient was sitting in the patient lounge reading at the start of the shift. When asked how their night was going stated, \"It's going\". Asked if Thanksgiving is next week and expressed they hope to be home for the holiday. Reports depression but denies anxiety, SI, HI, and hallucinations of any kind.    Medication compliant. Denies any unmet needs or concerns at this time.   "

## 2023-11-18 NOTE — NURSING NOTE
"Pt is calm, scant, and isolative to room reporting a \"6 out of 10\" depressed mood, no anxiety, and no SI/HI. Reports AH of multiple voices \"telling me I'm going to die.\" Pt says he believes the voices and does not want to die. Pt declined breakfast. Isolative to room laying in bed. No unmet needs at this time.    "

## 2023-11-18 NOTE — PLAN OF CARE
Problem: PSYCHOSIS  Goal: Will report no hallucinations or delusions  Description: Interventions:  - Administer medication as  ordered  - Every waking shifts and PRN assess for the presence of hallucinations and or delusions  - Assist with reality testing to support increasing orientation  - Assess if patient's hallucinations or delusions are encouraging self-harm or harm to others and intervene as appropriate  Outcome: Progressing     Problem: ANXIETY  Goal: Will report anxiety at manageable levels  Description: INTERVENTIONS:  - Administer medication as ordered  - Teach and encourage coping skills  - Provide emotional support  - Assess patient/family for anxiety and ability to cope  Outcome: Progressing     Problem: Ineffective Coping  Goal: Participates in unit activities  Description: Interventions:  - Provide therapeutic environment   - Provide required programming   - Redirect inappropriate behaviors   Outcome: Progressing     Problem: Depression  Goal: Treatment Goal: Demonstrate behavioral control of depressive symptoms, verbalize feelings of improved mood/affect, and adopt new coping skills prior to discharge  Outcome: Progressing  Goal: Verbalize thoughts and feelings  Description: Interventions:  - Assess and re-assess patient's level of risk   - Engage patient in 1:1 interactions, daily, for a minimum of 15 minutes   - Encourage patient to express feelings, fears, frustrations, hopes   Outcome: Progressing  Goal: Refrain from isolation  Description: Interventions:  - Develop a trusting relationship   - Encourage socialization   Outcome: Progressing  Goal: Refrain from self-neglect  Outcome: Progressing

## 2023-11-19 PROCEDURE — 99232 SBSQ HOSP IP/OBS MODERATE 35: CPT | Performed by: STUDENT IN AN ORGANIZED HEALTH CARE EDUCATION/TRAINING PROGRAM

## 2023-11-19 RX ADMIN — HALOPERIDOL 10 MG: 10 TABLET ORAL at 17:41

## 2023-11-19 RX ADMIN — NICOTINE POLACRILEX 4 MG: 4 GUM, CHEWING ORAL at 16:12

## 2023-11-19 RX ADMIN — MELATONIN TAB 3 MG 3 MG: 3 TAB at 21:33

## 2023-11-19 RX ADMIN — BENZTROPINE MESYLATE 0.5 MG: 0.5 TABLET ORAL at 09:27

## 2023-11-19 RX ADMIN — LITHIUM CARBONATE 600 MG: 300 TABLET, EXTENDED RELEASE ORAL at 21:33

## 2023-11-19 RX ADMIN — CHOLECALCIFEROL TAB 25 MCG (1000 UNIT) 2000 UNITS: 25 TAB at 09:27

## 2023-11-19 RX ADMIN — OLANZAPINE 20 MG: 10 TABLET, FILM COATED ORAL at 21:33

## 2023-11-19 RX ADMIN — ESCITALOPRAM OXALATE 10 MG: 10 TABLET, FILM COATED ORAL at 09:27

## 2023-11-19 RX ADMIN — BENZTROPINE MESYLATE 0.5 MG: 0.5 TABLET ORAL at 17:41

## 2023-11-19 RX ADMIN — MIRTAZAPINE 45 MG: 30 TABLET, FILM COATED ORAL at 21:33

## 2023-11-19 RX ADMIN — CYANOCOBALAMIN TAB 1000 MCG 1000 MCG: 1000 TAB at 09:27

## 2023-11-19 RX ADMIN — HALOPERIDOL 10 MG: 10 TABLET ORAL at 09:27

## 2023-11-19 NOTE — PROGRESS NOTES
"Progress Note - Behavioral Health   Alberto Berumen 28 y.o. male MRN: [id number]  Unit/Bed#: U 383-02 Encounter: [id number]    Assessment/Plan   Principal Problem:    Schizoaffective disorder, bipolar type (HCC)  Active Problems:    GERD (gastroesophageal reflux disease)    Medical clearance for psychiatric admission    Tobacco abuse    T wave inversion in EKG    Chronic idiopathic constipation    Vitamin B 12 deficiency    Vitamin D deficiency    Recommended Treatment:   Continue medications at current doses as below.  Encourage group therapy, milieu therapy and occupational therapy  All current active medications have been reviewed  Encourage group therapy, milieu therapy and occupational therapy  Behavioral Health checks every 7 minutes    ----------------------------------------      Subjective:   Per nursing report, patient has been calm and cooperative here.  He remains medication compliant.  He has been without any acute behavioral issues.  Patient remains withdrawn to her room. Patient reports that he is doing somewhat better.  He notes that he feels his depression is improved and his sleep is getting better.  He reports tolerating medications at current doses.  He denies any SI, HI, or AVH.  He continues to skip breakfast but reports that this is normal for him.  He denies any questions or concerns at this time.    Behavior over the last 24 hours:  improved  Sleep: normal  Appetite: increased  Medication side effects: No  ROS: no complaints and all other systems are negative    Mental Status Evaluation:  Appearance:  disheveled and marginal hygiene   Behavior:  cooperative and guarded   Speech:  soft and scant   Mood:  \"good\"   Affect:  blunted, mood-incongruent   Thought Process:  linear and goal directed   Associations: concrete associations   Thought Content:  no overt delusions   Perceptual Disturbances: denies auditory or visual hallucinations when asked, does not appear responding to internal " stimuli   Risk Potential: Suicidal ideation - None  Homicidal ideation - None  Potential for aggression - Not at present   Sensorium:  oriented to person, place, and time/date   Memory:  recent and remote memory grossly intact   Consciousness:  alert and awake   Attention/Concentration: attention span and concentration are age appropriate   Insight:  limited   Judgment: limited   Gait/Station: normal gait/station, normal balance   Motor Activity: no abnormal movements     Medications: all current active meds have been reviewed and continue current psychiatric medications.  Current Facility-Administered Medications   Medication Dose Route Frequency Provider Last Rate    acetaminophen  650 mg Oral Q6H PRN Yasmin Harvey MD      acetaminophen  650 mg Oral Q4H PRN Yasmin Harvey MD      acetaminophen  975 mg Oral Q6H PRN Yasmin Harvey MD      aluminum-magnesium hydroxide-simethicone  30 mL Oral Q4H PRN Yasmin Harvey MD      haloperidol lactate  2.5 mg Intramuscular Q4H PRN Max 4/day Yasmin Harvey MD      And    LORazepam  1 mg Intramuscular Q4H PRN Max 4/day Yasmin Harvey MD      And    benztropine  0.5 mg Intramuscular Q4H PRN Max 4/day Yasmin Harvey MD      haloperidol lactate  5 mg Intramuscular Q4H PRN Max 4/day Yasmin Harvey MD      And    LORazepam  2 mg Intramuscular Q4H PRN Max 4/day Yasmin Harvey MD      And    benztropine  1 mg Intramuscular Q4H PRN Max 4/day Yasmin Harvey MD      benztropine  0.5 mg Oral BID La Flores MD      benztropine  1 mg Oral Q4H PRN Max 6/day Yasmin Harvey MD      bisacodyl  10 mg Rectal Daily PRN Yasmin Harvey MD      cholecalciferol  2,000 Units Oral Daily HOLLI Monge      cyanocobalamin  1,000 mcg Oral Daily HOLLI Monge      hydrOXYzine HCL  50 mg Oral Q6H PRN Max 4/day Yasmin Harvey MD      Or    diphenhydrAMINE  50 mg Intramuscular Q6H PRN Yasmin Harvey MD      escitalopram  10 mg Oral  Daily La Flores MD      haloperidol  1 mg Oral Q6H PRN Yasmin Harvey MD      haloperidol  10 mg Oral BID La Folres MD      haloperidol  2.5 mg Oral Q4H PRN Max 4/day Yasmin Harvey MD      haloperidol  5 mg Oral Q4H PRN Max 4/day Yasmin Harvey MD      hydrOXYzine HCL  100 mg Oral Q6H PRN Max 4/day Yasmin Harvey MD      Or    LORazepam  2 mg Intramuscular Q6H PRN Yasmin Harvey MD      hydrOXYzine HCL  25 mg Oral Q6H PRN Max 4/day Yasmin Harvey MD      lithium carbonate  600 mg Oral HS La Flores MD      melatonin  3 mg Oral HS Yasmin Harvey MD      mirtazapine  45 mg Oral HS La Flores MD      nicotine polacrilex  4 mg Oral Q2H PRN Yasmin Harvey MD      OLANZapine  20 mg Oral HS La Flores MD      polyethylene glycol  17 g Oral Daily PRN Yasmin Harvey MD      polyethylene glycol  17 g Oral Daily HOLLI Mnoge      senna-docusate sodium  1 tablet Oral Daily PRN Yasmin Harvey MD      traZODone  50 mg Oral HS PRN Yasmin Harvey MD         Labs: I have personally reviewed all pertinent laboratory/tests results  Most Recent Labs:   Lab Results   Component Value Date    WBC 10.15 11/15/2023    RBC 5.32 11/15/2023    HGB 12.8 11/15/2023    HCT 42.4 11/15/2023     11/15/2023    RDW 14.0 11/15/2023    NEUTROABS 6.25 11/15/2023    SODIUM 139 11/18/2023    K 4.3 11/18/2023     11/18/2023    CO2 26 11/18/2023    BUN 10 11/18/2023    CREATININE 1.07 11/18/2023    GLUC 119 11/18/2023    CALCIUM 9.7 11/18/2023    AST 22 11/15/2023    ALT 40 11/15/2023    ALKPHOS 69 11/15/2023    TP 6.5 11/15/2023    ALB 4.1 11/15/2023    TBILI 0.45 11/15/2023    CHOLESTEROL 169 11/15/2023    HDL 37 (L) 11/15/2023    TRIG 117 11/15/2023    LDLCALC 109 (H) 11/15/2023    NONHDLC 132 11/15/2023    LITHIUM 0.3 (L) 11/18/2023    RLA6CEGTFVDP 1.062 11/15/2023    SYPHILISAB Non-reactive 07/13/2023    HGBA1C 4.9 11/15/2023    EAG 94 11/15/2023        Progress Toward Goals: progressing    Risks / Benefits of Treatment:    Risks, benefits, and possible side effects of medications explained to patient and patient verbalizes understanding and agreement for treatment.    Counseling / Coordination of Care:    Patient's progress discussed with staff in treatment team meeting.  Medications, treatment progress and treatment plan reviewed with patient.    Guy Arroyo MD 11/19/23

## 2023-11-19 NOTE — NURSING NOTE
"Pt is calm and cooperative reporting \"6 out of 10 depression and AH \"telling me I'm going to hell.\" Denies anxiety, SI/HI or VH. Pt is guarded and scant, isolative and withdrawn to bedroom. No unmet needs at this time.   "

## 2023-11-19 NOTE — NURSING NOTE
"Patient was visible this evening reading a book in the dayroom. Reports depression, anxiety, and auditory hallucinations of a voice that states \"you are going to hell\". Received scheduled night time medications and instructed patient to let staff know if symptoms worsen. Denies SI and HI.     Medication compliant. Completed scheduled lab work this evening. Able to make needs known and denies any unmet needs or concerns at this time.   "

## 2023-11-20 LAB — TREPONEMA PALLIDUM IGG+IGM AB [PRESENCE] IN SERUM OR PLASMA BY IMMUNOASSAY: NORMAL

## 2023-11-20 PROCEDURE — 99232 SBSQ HOSP IP/OBS MODERATE 35: CPT | Performed by: PSYCHIATRY & NEUROLOGY

## 2023-11-20 RX ORDER — LITHIUM CARBONATE 450 MG
900 TABLET, EXTENDED RELEASE ORAL
Status: DISCONTINUED | OUTPATIENT
Start: 2023-11-20 | End: 2023-11-27

## 2023-11-20 RX ORDER — HALOPERIDOL 10 MG/1
10 TABLET ORAL DAILY
Status: DISCONTINUED | OUTPATIENT
Start: 2023-11-21 | End: 2023-12-01

## 2023-11-20 RX ADMIN — LITHIUM CARBONATE 900 MG: 450 TABLET, EXTENDED RELEASE ORAL at 21:18

## 2023-11-20 RX ADMIN — BENZTROPINE MESYLATE 0.5 MG: 0.5 TABLET ORAL at 09:00

## 2023-11-20 RX ADMIN — HALOPERIDOL 10 MG: 10 TABLET ORAL at 09:00

## 2023-11-20 RX ADMIN — HALOPERIDOL 15 MG: 5 TABLET ORAL at 17:23

## 2023-11-20 RX ADMIN — CHOLECALCIFEROL TAB 25 MCG (1000 UNIT) 2000 UNITS: 25 TAB at 09:00

## 2023-11-20 RX ADMIN — ESCITALOPRAM OXALATE 10 MG: 10 TABLET, FILM COATED ORAL at 09:00

## 2023-11-20 RX ADMIN — MELATONIN TAB 3 MG 3 MG: 3 TAB at 21:18

## 2023-11-20 RX ADMIN — MIRTAZAPINE 45 MG: 30 TABLET, FILM COATED ORAL at 21:18

## 2023-11-20 RX ADMIN — OLANZAPINE 20 MG: 10 TABLET, FILM COATED ORAL at 21:18

## 2023-11-20 RX ADMIN — CYANOCOBALAMIN TAB 1000 MCG 1000 MCG: 1000 TAB at 09:00

## 2023-11-20 RX ADMIN — BENZTROPINE MESYLATE 0.5 MG: 0.5 TABLET ORAL at 17:23

## 2023-11-20 NOTE — PROGRESS NOTES
"Progress Note - Behavioral Health     Alberto Berumen 28 y.o. male MRN: [id number]   Unit/Bed#: -02 Encounter: [id number]    Behavior over the last 24 hours: minimal improvement.     Alberto states he feels slightly less depressed and rates mood as 5 on a scale of 1 (best mood) to 10 (worst mood) \"I am not in my head as much\". He otherwise still reports auditory hallucinations of \"voices saying that I am going to hell\", still seems paranoid and preoccupied. He still endorses suicidal thoughts without plan today - contracts for safety on the inpatient unit. Seclusive to the room. Limited participation in milieu. Has been taking medications.    Sleep: normal  Appetite: normal  Medication side effects: No   ROS: no complaints, denies any shortness of breath, chest pain, or abdominal pain, all other systems are negative    Mental Status Evaluation:    Appearance:  casually dressed   Behavior:  guarded, poor eye contact, psychomotor retardation   Speech:  scant, soft   Mood:  slightly less depressed   Affect:  flat   Thought Process:  concrete   Associations: concrete associations   Thought Content:  persecutory delusions   Perceptual Disturbances: auditory hallucinations of \"voices saying that I am going to hell\", no visual hallucinations   Risk Potential: Suicidal ideation - Yes, without plan, contracts for safety on the unit  Homicidal ideation - None  Potential for aggression - No   Sensorium:  oriented to person, place, and time/date   Memory:  recent and remote memory grossly intact   Consciousness:  alert and awake   Attention/Concentration: decreased concentration and decreased attention span   Insight:  impaired   Judgment: impaired   Gait/Station: normal gait/station, normal balance   Motor Activity: no abnormal movements     Vital signs in last 24 hours:    Temp:  [98.1 °F (36.7 °C)] 98.1 °F (36.7 °C)  HR:  [99] 99  Resp:  [16] 16  BP: (135)/(96) 135/96    Laboratory results: I have personally reviewed " all pertinent laboratory/tests results    Last Laboratory Results with Lithium level:   Lab Results   Component Value Date    SODIUM 139 11/18/2023    K 4.3 11/18/2023     11/18/2023    CO2 26 11/18/2023    BUN 10 11/18/2023    CREATININE 1.07 11/18/2023    GLUC 119 11/18/2023    CALCIUM 9.7 11/18/2023    LITHIUM 0.3 (L) 11/18/2023       Suicide/Homicide Risk Assessment:    Risk of Harm to Self:   Nursing Suicide Risk Assessment Last 24 hours: C-SSRS Risk (Since Last Contact)  Calculated C-SSRS Risk Score (Since Last Contact): No Risk Indicated  Current Specific Risk Factors include: has suicidal ideation without a plan, mental illness diagnosis, current depressive symptoms, current psychotic symptoms, presence of hallucinations, presence of delusions  Protective Factors: ability to communicate with staff on the unit, able to contract for safety on the unit, taking medications as ordered on the unit  Based on today's assessment, Alberto presents the following risk of harm to self: low    Risk of Harm to Others:  Nursing Homicide Risk Assessment: Violence Risk to Others: Denies within past 6 months  Based on today's assessment, Alberto presents the following risk of harm to others: low    The following interventions are recommended: behavioral checks every 7 minutes, continued hospitalization on locked unit    Progress Toward Goals: minimal progress, slightly less depressed, poor motivation, still has passive suicidal thoughts, still reports psychotic symptoms    Assessment/Plan   Principal Problem:    Schizoaffective disorder, bipolar type (HCC)  Active Problems:    GERD (gastroesophageal reflux disease)    Medical clearance for psychiatric admission    Tobacco abuse    T wave inversion in EKG    Chronic idiopathic constipation    Vitamin B 12 deficiency    Vitamin D deficiency      Recommended Treatment:     Planned medication and treatment changes:    All current active medications have been  reviewed  Encourage group therapy, milieu therapy and occupational therapy  Behavioral Health checks every 7 minutes  Increase Haldol to 10 mg daily and 15 mg every evening to help with psychotic symptoms  Increase Lithium CR to 900 mg at bedtime to help with mood stabilization. Lithium level is subtherapeutic at 0.3  Recheck Lithium level and BMP on 11/23/2023    Continue all other medications:    Current Facility-Administered Medications   Medication Dose Route Frequency Provider Last Rate    acetaminophen  650 mg Oral Q6H PRN Yasmin Harvey MD      acetaminophen  650 mg Oral Q4H PRN Yasmin Harvey MD      acetaminophen  975 mg Oral Q6H PRN Yasmin Harvey MD      aluminum-magnesium hydroxide-simethicone  30 mL Oral Q4H PRN Yasmin Harvey MD      haloperidol lactate  2.5 mg Intramuscular Q4H PRN Max 4/day Yasmin Harvey MD      And    LORazepam  1 mg Intramuscular Q4H PRN Max 4/day Yasmin Harvey MD      And    benztropine  0.5 mg Intramuscular Q4H PRN Max 4/day Yasmin Harvey MD      haloperidol lactate  5 mg Intramuscular Q4H PRN Max 4/day Yasmin Harvey MD      And    LORazepam  2 mg Intramuscular Q4H PRN Max 4/day Yasmin Harvey MD      And    benztropine  1 mg Intramuscular Q4H PRN Max 4/day Yasmin Harvey MD      benztropine  0.5 mg Oral BID La Flores MD      benztropine  1 mg Oral Q4H PRN Max 6/day Yasmin Harvey MD      bisacodyl  10 mg Rectal Daily PRN Yasmin Harvey MD      cholecalciferol  2,000 Units Oral Daily HOLLI Monge      cyanocobalamin  1,000 mcg Oral Daily HOLLI Monge      hydrOXYzine HCL  50 mg Oral Q6H PRN Max 4/day Yasmin Harvey MD      Or    diphenhydrAMINE  50 mg Intramuscular Q6H PRN Yasmin Harvey MD      escitalopram  10 mg Oral Daily La Flores MD      haloperidol  1 mg Oral Q6H PRN Yasmin Harvey MD      [START ON 11/21/2023] haloperidol  10 mg Oral Daily La Flores MD       haloperidol  15 mg Oral QPM La Flores MD      haloperidol  2.5 mg Oral Q4H PRN Max 4/day Yasmin Harvey MD      haloperidol  5 mg Oral Q4H PRN Max 4/day Yasmin Harvey MD      hydrOXYzine HCL  100 mg Oral Q6H PRN Max 4/day Yasmin Harvey MD      Or    LORazepam  2 mg Intramuscular Q6H PRN Yasmin Harvey MD      hydrOXYzine HCL  25 mg Oral Q6H PRN Max 4/day Yasmin Harvey MD      lithium carbonate  900 mg Oral HS La Flores MD      melatonin  3 mg Oral HS Yasmin Harvey MD      mirtazapine  45 mg Oral HS La Flores MD      nicotine polacrilex  4 mg Oral Q2H PRN Yasmin Harvey MD      OLANZapine  20 mg Oral HS La Flores MD      polyethylene glycol  17 g Oral Daily PRN Yasmin Harvey MD      polyethylene glycol  17 g Oral Daily HOLLI Monge      senna-docusate sodium  1 tablet Oral Daily PRN Yasmin Harvey MD      traZODone  50 mg Oral HS PRN Yasmin Harvey MD       Risks / Benefits of Treatment:    Risks, benefits, and possible side effects of medications explained to patient. Patient has limited understanding of risks and benefits of treatment at this time, but agrees to take medications as prescribed.    Counseling / Coordination of Care:    Patient's progress discussed with staff in treatment team meeting.  Medications, treatment progress and treatment plan reviewed with patient.  Medication changes discussed with patient.    La Flores MD 11/20/23

## 2023-11-20 NOTE — NURSING NOTE
"Patient was visible in the dayroom reading and using the patient telephone this evening. Denied depression, anxiety, SI, and HI. Reports voices and hears the voice telling them that they \"are going to hell\". Medication compliant. Denies any unmet needs or concerns at this time.   "

## 2023-11-20 NOTE — PROGRESS NOTES
11/20/23 1158   Team Meeting   Meeting Type Daily Rounds   Team Members Present   Team Members Present Physician;;Nurse   Physician Team Member Mark   Nursing Team Member Earl   Care Management Team Member Miguelito Camarillo   Patient/Family Present   Patient Present No   Patient's Family Present No       Patient currently holds 201 status. Patient denies SI/ HI but confirms AH. Patient is complaint with medications and meals. Patient will begin taking lexapro 10mg daily, Hadol 10mg daily, Hadol 15mg HS, increased dosage of Lithium 900mg HS, Remeron 45mg HS, Melatonin 3mg HS, and Zyprexa 20mg HS. Patient discharge date and plan is to be determined.

## 2023-11-20 NOTE — NURSING NOTE
"Pt denies active SI and HI. Reports ongoing moderate severity of depression and anxiety; persistent auditory hallucinations of voices telling pt to \"go to Hell.\" Observed in bedroom, slept through breakfast, stated lack of appetite at time of this note. Compliant with meds, refused miralax, denied any concerns with BM's. Affect is flat, limited eye contact, apprears withdrawn, scant verbal feedback.         "

## 2023-11-21 PROCEDURE — 99232 SBSQ HOSP IP/OBS MODERATE 35: CPT | Performed by: PSYCHIATRY & NEUROLOGY

## 2023-11-21 RX ADMIN — POLYETHYLENE GLYCOL 3350 17 G: 17 POWDER, FOR SOLUTION ORAL at 08:06

## 2023-11-21 RX ADMIN — CYANOCOBALAMIN TAB 1000 MCG 1000 MCG: 1000 TAB at 08:05

## 2023-11-21 RX ADMIN — HALOPERIDOL 15 MG: 5 TABLET ORAL at 18:02

## 2023-11-21 RX ADMIN — LITHIUM CARBONATE 900 MG: 450 TABLET, EXTENDED RELEASE ORAL at 21:15

## 2023-11-21 RX ADMIN — ESCITALOPRAM OXALATE 10 MG: 10 TABLET, FILM COATED ORAL at 08:06

## 2023-11-21 RX ADMIN — BENZTROPINE MESYLATE 0.5 MG: 0.5 TABLET ORAL at 08:05

## 2023-11-21 RX ADMIN — CHOLECALCIFEROL TAB 25 MCG (1000 UNIT) 2000 UNITS: 25 TAB at 08:05

## 2023-11-21 RX ADMIN — HALOPERIDOL 10 MG: 10 TABLET ORAL at 08:05

## 2023-11-21 RX ADMIN — NICOTINE POLACRILEX 4 MG: 4 GUM, CHEWING ORAL at 18:03

## 2023-11-21 RX ADMIN — OLANZAPINE 20 MG: 10 TABLET, FILM COATED ORAL at 21:15

## 2023-11-21 RX ADMIN — MIRTAZAPINE 45 MG: 30 TABLET, FILM COATED ORAL at 21:15

## 2023-11-21 RX ADMIN — BENZTROPINE MESYLATE 0.5 MG: 0.5 TABLET ORAL at 18:02

## 2023-11-21 RX ADMIN — MELATONIN TAB 3 MG 3 MG: 3 TAB at 21:15

## 2023-11-21 NOTE — PLAN OF CARE
Problem: ANXIETY  Goal: Will report anxiety at manageable levels  Description: INTERVENTIONS:  - Administer medication as ordered  - Teach and encourage coping skills  - Provide emotional support  - Assess patient/family for anxiety and ability to cope  Outcome: Progressing     Problem: Ineffective Coping  Goal: Participates in unit activities  Description: Interventions:  - Provide therapeutic environment   - Provide required programming   - Redirect inappropriate behaviors   Outcome: Progressing     Problem: Depression  Goal: Treatment Goal: Demonstrate behavioral control of depressive symptoms, verbalize feelings of improved mood/affect, and adopt new coping skills prior to discharge  Outcome: Progressing  Goal: Verbalize thoughts and feelings  Description: Interventions:  - Assess and re-assess patient's level of risk   - Engage patient in 1:1 interactions, daily, for a minimum of 15 minutes   - Encourage patient to express feelings, fears, frustrations, hopes   Outcome: Progressing  Goal: Refrain from isolation  Description: Interventions:  - Develop a trusting relationship   - Encourage socialization   Outcome: Progressing  Goal: Refrain from self-neglect  Outcome: Progressing     Problem: PSYCHOSIS  Goal: Will report no hallucinations or delusions  Description: Interventions:  - Administer medication as  ordered  - Every waking shifts and PRN assess for the presence of hallucinations and or delusions  - Assist with reality testing to support increasing orientation  - Assess if patient's hallucinations or delusions are encouraging self-harm or harm to others and intervene as appropriate  Outcome: Not Progressing

## 2023-11-21 NOTE — NURSING NOTE
"Pt is calm and cooperative upon approach. Pt was visible in the milieu attending group, social with peers. Pt reports CAH, pt stated \"The voices are telling me I'm going to hell and to kill people\". Pt verbalizes they have no intent on harming others and feels safe on the unit. Denies SI, and pain. Compliant with HS medication and snack. Denies unmet needs/ concerns.   "

## 2023-11-21 NOTE — NURSING NOTE
"Pt slept through breakfast, present for lunch, compliant with scheduled medications.  Pt endorses auditory hallucinations of voices telling him \"that I am going to hell\".  Pt denies HI/SI/VH and is using \"prayer\" as a coping mechanism.  Pt has attending one group,  but otherwise isolated to his room. Pt has not reported any unmet needs.    "

## 2023-11-21 NOTE — PROGRESS NOTES
11/21/23 1506   Team Meeting   Meeting Type Daily Rounds   Team Members Present   Team Members Present Physician;Nurse;   Physician Team Member Mark   Nursing Team Member Giancarlo   Care Management Team Member Miguelito Camarillo   Patient/Family Present   Patient Present No   Patient's Family Present No       Patient currently holds 201 status. Patient is medication and meal compliant. Patient disclosed hearing command AH. Patient will continue taking lexapro 10mg daily, Hadol 10mg daily, Hadol 15mg HS,  Lithium 900mg HS, Remeron 45mg HS, Melatonin 3mg HS, and Zyprexa 20mg HS. Patient discharge date and plan is to be determined.

## 2023-11-21 NOTE — PROGRESS NOTES
"Progress Note - Behavioral Health     Alberto Berumen 28 y.o. male MRN: [id number]   Unit/Bed#: -02 Encounter: [id number]    Behavior over the last 24 hours: unchanged.     Alberto continues to report auditory hallucinations with derogatory comments \"voices saying that I am going to hell and that they will kill my family\". He still endorses suicidal thoughts without plan. He contracts for safety in the hospital. Flat affect, seclusive to his room and does not attend groups.  today. Compliant with medications.    Sleep: normal  Appetite: normal  Medication side effects: No   ROS: no complaints, denies any shortness of breath, chest pain, or abdominal pain, all other systems are negative    Mental Status Evaluation:    Appearance:  casually dressed   Behavior:  guarded, poor eye contact, psychomotor retardation   Speech:  scant, soft   Mood:  depressed   Affect:  flat   Thought Process:  concrete   Associations: concrete associations   Thought Content:  paranoid delusions   Perceptual Disturbances: auditory hallucinations with derogatory comments, no visual hallucinations   Risk Potential: Suicidal ideation - Yes, without plan, contracts for safety on the unit  Homicidal ideation - None  Potential for aggression - No   Sensorium:  oriented to person, place, and time/date   Memory:  recent and remote memory grossly intact   Consciousness:  alert and awake   Attention/Concentration: poor concentration and poor attention span   Insight:  poor   Judgment: poor   Gait/Station: normal gait/station, normal balance   Motor Activity: no abnormal movements     Vital signs in last 24 hours:    Temp:  [97.8 °F (36.6 °C)-98.2 °F (36.8 °C)] 97.8 °F (36.6 °C)  HR:  [60-99] 60  Resp:  [16] 16  BP: (123-128)/(67-72) 123/67    Laboratory results: I have personally reviewed all pertinent laboratory/tests results    Results from the past 24 hours: No results found for this or any previous visit (from the past 24 " hour(s)).    Suicide/Homicide Risk Assessment:    Risk of Harm to Self:   Nursing Suicide Risk Assessment Last 24 hours: C-SSRS Risk (Since Last Contact)  Calculated C-SSRS Risk Score (Since Last Contact): No Risk Indicated  Current Specific Risk Factors include: has suicidal ideation without a plan, mental illness diagnosis, current depressive symptoms, current psychotic symptoms, presence of hallucinations, presence of paranoid ideation  Protective Factors: ability to communicate with staff on the unit, able to contract for safety on the unit, taking medications as ordered on the unit  Based on today's assessment, Alberto presents the following risk of harm to self: low    Risk of Harm to Others:  Nursing Homicide Risk Assessment: Violence Risk to Others: Denies within past 6 months  Based on today's assessment, Alberto presents the following risk of harm to others: low    The following interventions are recommended: behavioral checks every 7 minutes, continued hospitalization on locked unit    Progress Toward Goals: no significant improvement today, continues to feel depressed, poor motivation, still has passive suicidal thoughts, still reports psychotic symptoms    Assessment/Plan   Principal Problem:    Schizoaffective disorder, bipolar type (HCC)  Active Problems:    GERD (gastroesophageal reflux disease)    Medical clearance for psychiatric admission    Tobacco abuse    T wave inversion in EKG    Chronic idiopathic constipation    Vitamin B 12 deficiency    Vitamin D deficiency      Recommended Treatment:     Planned medication and treatment changes:    All current active medications have been reviewed  Encourage group therapy, milieu therapy and occupational therapy  Behavioral Health checks every 7 minutes  Recheck Lithium level and BMP on 11/24/2023    Continue current medications:    Current Facility-Administered Medications   Medication Dose Route Frequency Provider Last Rate    acetaminophen  650 mg  Oral Q6H PRN Yasmin Harvey MD      acetaminophen  650 mg Oral Q4H PRN Yasmin Harvey MD      acetaminophen  975 mg Oral Q6H PRN Yasmin Harvey MD      aluminum-magnesium hydroxide-simethicone  30 mL Oral Q4H PRN Yasmin Harvey MD      haloperidol lactate  2.5 mg Intramuscular Q4H PRN Max 4/day Yasmin Harvey MD      And    LORazepam  1 mg Intramuscular Q4H PRN Max 4/day Yasmin Harvey MD      And    benztropine  0.5 mg Intramuscular Q4H PRN Max 4/day Yasmin Harvey MD      haloperidol lactate  5 mg Intramuscular Q4H PRN Max 4/day Yasmin Harvey MD      And    LORazepam  2 mg Intramuscular Q4H PRN Max 4/day Yasmin Harvey MD      And    benztropine  1 mg Intramuscular Q4H PRN Max 4/day Yasmin Harvey MD      benztropine  0.5 mg Oral BID La Flores MD      benztropine  1 mg Oral Q4H PRN Max 6/day Yasmin Harvey MD      bisacodyl  10 mg Rectal Daily PRN Yasmin Harvey MD      cholecalciferol  2,000 Units Oral Daily HOLLI Monge      cyanocobalamin  1,000 mcg Oral Daily HOLLI Monge      hydrOXYzine HCL  50 mg Oral Q6H PRN Max 4/day Yasmin Harvey MD      Or    diphenhydrAMINE  50 mg Intramuscular Q6H PRN Yasmin Harvey MD      escitalopram  10 mg Oral Daily La Flores MD      haloperidol  1 mg Oral Q6H PRN Yasmin Harvey MD      haloperidol  10 mg Oral Daily La Flores MD      haloperidol  15 mg Oral QPM La Flores MD      haloperidol  2.5 mg Oral Q4H PRN Max 4/day Yasmin Harvey MD      haloperidol  5 mg Oral Q4H PRN Max 4/day Yasmin Harvey MD      hydrOXYzine HCL  100 mg Oral Q6H PRN Max 4/day Yasmin Harvey MD      Or    LORazepam  2 mg Intramuscular Q6H PRN Yasmin Harvey MD      hydrOXYzine HCL  25 mg Oral Q6H PRN Max 4/day Yasmin Harvey MD      lithium carbonate  900 mg Oral HS La Flores MD      melatonin  3 mg Oral HS Yasmin Harvey MD      mirtazapine  45 mg Oral HS La  MD Mark      nicotine polacrilex  4 mg Oral Q2H PRN Yasmin Harvey MD      OLANZapine  20 mg Oral HS La Flores MD      polyethylene glycol  17 g Oral Daily PRN Yasmin Harvey MD      polyethylene glycol  17 g Oral Daily HOLLI Monge      senna-docusate sodium  1 tablet Oral Daily PRN Yasmin Harvey MD      traZODone  50 mg Oral HS PRN Yasmin Harvey MD       Risks / Benefits of Treatment:    Risks, benefits, and possible side effects of medications explained to patient. Patient has limited understanding of risks and benefits of treatment at this time, but agrees to take medications as prescribed.    Counseling / Coordination of Care:    Patient's progress discussed with staff in treatment team meeting.  Medications, treatment progress and treatment plan reviewed with patient.    La Flores MD 11/21/23

## 2023-11-22 PROCEDURE — 99232 SBSQ HOSP IP/OBS MODERATE 35: CPT | Performed by: PSYCHIATRY & NEUROLOGY

## 2023-11-22 RX ADMIN — OLANZAPINE 20 MG: 10 TABLET, FILM COATED ORAL at 21:36

## 2023-11-22 RX ADMIN — NICOTINE POLACRILEX 4 MG: 4 GUM, CHEWING ORAL at 18:46

## 2023-11-22 RX ADMIN — MELATONIN TAB 3 MG 3 MG: 3 TAB at 21:36

## 2023-11-22 RX ADMIN — CYANOCOBALAMIN TAB 1000 MCG 1000 MCG: 1000 TAB at 08:13

## 2023-11-22 RX ADMIN — LITHIUM CARBONATE 900 MG: 450 TABLET, EXTENDED RELEASE ORAL at 21:36

## 2023-11-22 RX ADMIN — HALOPERIDOL 15 MG: 5 TABLET ORAL at 17:30

## 2023-11-22 RX ADMIN — CHOLECALCIFEROL TAB 25 MCG (1000 UNIT) 2000 UNITS: 25 TAB at 08:13

## 2023-11-22 RX ADMIN — MIRTAZAPINE 45 MG: 30 TABLET, FILM COATED ORAL at 21:36

## 2023-11-22 RX ADMIN — ESCITALOPRAM OXALATE 10 MG: 10 TABLET, FILM COATED ORAL at 08:13

## 2023-11-22 RX ADMIN — BENZTROPINE MESYLATE 0.5 MG: 0.5 TABLET ORAL at 08:13

## 2023-11-22 RX ADMIN — POLYETHYLENE GLYCOL 3350 17 G: 17 POWDER, FOR SOLUTION ORAL at 08:12

## 2023-11-22 RX ADMIN — HALOPERIDOL 10 MG: 10 TABLET ORAL at 08:13

## 2023-11-22 RX ADMIN — BENZTROPINE MESYLATE 0.5 MG: 0.5 TABLET ORAL at 17:30

## 2023-11-22 NOTE — PROGRESS NOTES
11/22/23 1109   Team Meeting   Meeting Type Daily Rounds   Team Members Present   Team Members Present Physician;Nurse;   Physician Team Member Mark   Nursing Team Member Earl   Care Management Team Member Miguelito Camarillo   Patient/Family Present   Patient Present No   Patient's Family Present No         Patient currently holds 201 status. Patient is medication and meal compliant. Patient disclosed hearing command AH. Patient will continue taking lexapro 10mg daily, Hadol 10mg daily, Hadol 15mg HS,  Lithium 900mg HS, Remeron 45mg HS, Melatonin 3mg HS, and Zyprexa 20mg HS. Patient discharge date and plan is to be determined.            3

## 2023-11-22 NOTE — NURSING NOTE
"Pt denies SI, HI and VH. Pt reporting AH of voices stating \"go to hell\" pt is isolative to self and room. Pt has no complaints or concerns. Pt appears depressed laying in bed. Medication compliant.   "

## 2023-11-22 NOTE — NURSING NOTE
"Pt is calm and cooperative upon approach. Visible and social, able to make needs known. Brighter affect in conversation. Denies SI, HI, VH, and pain. Reports CAH stating \"the voices are telling me to go to hell\". Feels safe on the unit. Compliant with HS medication and snack. Denies unmet needs/ concerns at this time.  "

## 2023-11-22 NOTE — PROGRESS NOTES
"Progress Note - Behavioral Health     Alebrto Berumen 28 y.o. male MRN: [id number]   Unit/Bed#: U 383-02 Encounter: [id number]    Behavior over the last 24 hours: minimal improvement.     Alberto is still seclusive and withdrawn, still has poor eye contact and flat affect. He still reports auditory hallucinations of \"voices saying that I am going to hell\", but states that hallucinations are slightly less prominent and describes his progress as \"Me not being in my head as much\". He denies current suicidal thoughts and feels safe in the hospital. Does not want to attend groups. Compliant with medications.    Sleep: normal  Appetite: normal  Medication side effects: No   ROS: no complaints, denies any shortness of breath, chest pain, or abdominal pain, all other systems are negative    Mental Status Evaluation:    Appearance:  casually dressed   Behavior:  guarded, poor eye contact, psychomotor retardation   Speech:  scant, soft   Mood:  depressed   Affect:  flat   Thought Process:  concrete   Associations: concrete associations   Thought Content:  paranoid ideation   Perceptual Disturbances: auditory hallucinations of \"voices saying that I am gong to hell\", no visual hallucinations   Risk Potential: Suicidal ideation - None at present  Homicidal ideation - None  Potential for aggression - No   Sensorium:  oriented to person, place, and time/date   Memory:  recent and remote memory grossly intact   Consciousness:  alert and awake   Attention/Concentration: poor concentration and poor attention span   Insight:  poor   Judgment: poor   Gait/Station: normal gait/station, normal balance   Motor Activity: no abnormal movements     Vital signs in last 24 hours:         Laboratory results: I have personally reviewed all pertinent laboratory/tests results    Results from the past 24 hours: No results found for this or any previous visit (from the past 24 hour(s)).    Suicide/Homicide Risk Assessment:    Risk of Harm to Self: "   Nursing Suicide Risk Assessment Last 24 hours: C-SSRS Risk (Since Last Contact)  Calculated C-SSRS Risk Score (Since Last Contact): No Risk Indicated  Current Specific Risk Factors include: mental illness diagnosis, current psychotic symptoms, presence of hallucinations  Protective Factors: no current suicidal ideation, ability to communicate with staff on the unit, taking medications as ordered on the unit  Based on today's assessment, Alberto presents the following risk of harm to self: low    Risk of Harm to Others:  Nursing Homicide Risk Assessment: Violence Risk to Others: Denies within past 6 months  Based on today's assessment, Alberto presents the following risk of harm to others: low    The following interventions are recommended: behavioral checks every 7 minutes, continued hospitalization on locked unit    Progress Toward Goals: minimal progress, still depressed, poor motivation, denies current suicidal thoughts, still has psychotic symptoms    Assessment/Plan   Principal Problem:    Schizoaffective disorder, bipolar type (HCC)  Active Problems:    GERD (gastroesophageal reflux disease)    Medical clearance for psychiatric admission    Tobacco abuse    T wave inversion in EKG    Chronic idiopathic constipation    Vitamin B 12 deficiency    Vitamin D deficiency      Recommended Treatment:     Planned medication and treatment changes:    All current active medications have been reviewed  Encourage group therapy, milieu therapy and occupational therapy  Behavioral Health checks every 7 minutes  Recheck Lithium level and BMP on 11/24/2023    Continue current medications:    Current Facility-Administered Medications   Medication Dose Route Frequency Provider Last Rate    acetaminophen  650 mg Oral Q6H PRN Yasmin Harvey MD      acetaminophen  650 mg Oral Q4H PRN Yasmin Harvey MD      acetaminophen  975 mg Oral Q6H PRN Yasmin Harvey MD      aluminum-magnesium hydroxide-simethicone  30 mL Oral Q4H  PRN Yasmin Harvey MD      haloperidol lactate  2.5 mg Intramuscular Q4H PRN Max 4/day Yasmin Harvey MD      And    LORazepam  1 mg Intramuscular Q4H PRN Max 4/day Yasmin Harvey MD      And    benztropine  0.5 mg Intramuscular Q4H PRN Max 4/day Yasmin Harvey MD      haloperidol lactate  5 mg Intramuscular Q4H PRN Max 4/day Yasmin Harvey MD      And    LORazepam  2 mg Intramuscular Q4H PRN Max 4/day Yasmin Harvey MD      And    benztropine  1 mg Intramuscular Q4H PRN Max 4/day Ysamin Harvey MD      benztropine  0.5 mg Oral BID La Flores MD      benztropine  1 mg Oral Q4H PRN Max 6/day Yasmin Harvey MD      bisacodyl  10 mg Rectal Daily PRN Yasmin Harvey MD      cholecalciferol  2,000 Units Oral Daily HOLLI Monge      cyanocobalamin  1,000 mcg Oral Daily HOLLI Monge      hydrOXYzine HCL  50 mg Oral Q6H PRN Max 4/day Yasmin Harvey MD      Or    diphenhydrAMINE  50 mg Intramuscular Q6H PRN Yasmin Harvey MD      escitalopram  10 mg Oral Daily La Flores MD      haloperidol  1 mg Oral Q6H PRN Yasmin Harvey MD      haloperidol  10 mg Oral Daily La Flores MD      haloperidol  15 mg Oral QPM La Flores MD      haloperidol  2.5 mg Oral Q4H PRN Max 4/day Yasmin Harvey MD      haloperidol  5 mg Oral Q4H PRN Max 4/day Yasmin Harvey MD      hydrOXYzine HCL  100 mg Oral Q6H PRN Max 4/day Yasmin Harvey MD      Or    LORazepam  2 mg Intramuscular Q6H PRN Yasmin Harvey MD      hydrOXYzine HCL  25 mg Oral Q6H PRN Max 4/day Yasmin Harvey MD      lithium carbonate  900 mg Oral HS La Flores MD      melatonin  3 mg Oral HS Yasmin Harvey MD      mirtazapine  45 mg Oral HS La Flores MD      nicotine polacrilex  4 mg Oral Q2H PRN Yasmin Harvey MD      OLANZapine  20 mg Oral HS La Flores MD      polyethylene glycol  17 g Oral Daily PRN Yasmin Harvey MD      polyethylene  glycol  17 g Oral Daily HOLLI Monge      senna-docusate sodium  1 tablet Oral Daily PRN Yasmin Harvey MD      traZODone  50 mg Oral HS PRN Yasmin Harvey MD       Risks / Benefits of Treatment:    Risks, benefits, and possible side effects of medications explained to patient. Patient has limited understanding of risks and benefits of treatment at this time, but agrees to take medications as prescribed.  The patient has a history of at least 3 antipsychotic medication trials and at this time requires treatment with 2 antipsychotic agents (Zyprexa and Haldol) due to failed multiple trials of monotherapy.    Counseling / Coordination of Care:    Patient's progress discussed with staff in treatment team meeting.  Medications, treatment progress and treatment plan reviewed with patient.    La Flores MD 11/22/23

## 2023-11-23 PROCEDURE — 99232 SBSQ HOSP IP/OBS MODERATE 35: CPT | Performed by: PSYCHIATRY & NEUROLOGY

## 2023-11-23 RX ADMIN — HALOPERIDOL 10 MG: 10 TABLET ORAL at 09:29

## 2023-11-23 RX ADMIN — BENZTROPINE MESYLATE 0.5 MG: 0.5 TABLET ORAL at 17:45

## 2023-11-23 RX ADMIN — MIRTAZAPINE 45 MG: 30 TABLET, FILM COATED ORAL at 21:13

## 2023-11-23 RX ADMIN — ESCITALOPRAM OXALATE 10 MG: 10 TABLET, FILM COATED ORAL at 09:29

## 2023-11-23 RX ADMIN — LITHIUM CARBONATE 900 MG: 450 TABLET, EXTENDED RELEASE ORAL at 21:13

## 2023-11-23 RX ADMIN — OLANZAPINE 20 MG: 10 TABLET, FILM COATED ORAL at 21:12

## 2023-11-23 RX ADMIN — ACETAMINOPHEN 650 MG: 325 TABLET ORAL at 10:12

## 2023-11-23 RX ADMIN — CHOLECALCIFEROL TAB 25 MCG (1000 UNIT) 2000 UNITS: 25 TAB at 09:29

## 2023-11-23 RX ADMIN — HALOPERIDOL 15 MG: 5 TABLET ORAL at 17:44

## 2023-11-23 RX ADMIN — MELATONIN TAB 3 MG 3 MG: 3 TAB at 21:14

## 2023-11-23 RX ADMIN — NICOTINE POLACRILEX 4 MG: 4 GUM, CHEWING ORAL at 17:50

## 2023-11-23 RX ADMIN — BENZTROPINE MESYLATE 0.5 MG: 0.5 TABLET ORAL at 09:29

## 2023-11-23 RX ADMIN — CYANOCOBALAMIN TAB 1000 MCG 1000 MCG: 1000 TAB at 09:29

## 2023-11-23 RX ADMIN — POLYETHYLENE GLYCOL 3350 17 G: 17 POWDER, FOR SOLUTION ORAL at 09:29

## 2023-11-23 NOTE — NURSING NOTE
Patient is visible on unit and withdrawn to self. Patient denies SI/HI/AVH. Patient denies feelings of depression and anxiety. Patient is calm, cooperative and compliant with medications and meals. No complaints or unmet needs identified at this time. Q 7 minute safety checks maintained.

## 2023-11-24 LAB
ANION GAP SERPL CALCULATED.3IONS-SCNC: 10 MMOL/L
BUN SERPL-MCNC: 11 MG/DL (ref 5–25)
CALCIUM SERPL-MCNC: 10.1 MG/DL (ref 8.4–10.2)
CHLORIDE SERPL-SCNC: 100 MMOL/L (ref 96–108)
CO2 SERPL-SCNC: 26 MMOL/L (ref 21–32)
CREAT SERPL-MCNC: 1.03 MG/DL (ref 0.6–1.3)
GFR SERPL CREATININE-BSD FRML MDRD: 99 ML/MIN/1.73SQ M
GLUCOSE SERPL-MCNC: 140 MG/DL (ref 65–140)
LITHIUM SERPL-SCNC: 0.6 MMOL/L (ref 0.6–1.2)
POTASSIUM SERPL-SCNC: 4.6 MMOL/L (ref 3.5–5.3)
SODIUM SERPL-SCNC: 136 MMOL/L (ref 135–147)

## 2023-11-24 PROCEDURE — 80178 ASSAY OF LITHIUM: CPT | Performed by: PSYCHIATRY & NEUROLOGY

## 2023-11-24 PROCEDURE — 80048 BASIC METABOLIC PNL TOTAL CA: CPT | Performed by: PSYCHIATRY & NEUROLOGY

## 2023-11-24 PROCEDURE — 99232 SBSQ HOSP IP/OBS MODERATE 35: CPT | Performed by: PSYCHIATRY & NEUROLOGY

## 2023-11-24 RX ADMIN — LITHIUM CARBONATE 900 MG: 450 TABLET, EXTENDED RELEASE ORAL at 21:28

## 2023-11-24 RX ADMIN — MELATONIN TAB 3 MG 3 MG: 3 TAB at 21:28

## 2023-11-24 RX ADMIN — BENZTROPINE MESYLATE 0.5 MG: 0.5 TABLET ORAL at 17:23

## 2023-11-24 RX ADMIN — CYANOCOBALAMIN TAB 1000 MCG 1000 MCG: 1000 TAB at 08:33

## 2023-11-24 RX ADMIN — CHOLECALCIFEROL TAB 25 MCG (1000 UNIT) 2000 UNITS: 25 TAB at 08:33

## 2023-11-24 RX ADMIN — NICOTINE POLACRILEX 4 MG: 4 GUM, CHEWING ORAL at 16:05

## 2023-11-24 RX ADMIN — NICOTINE POLACRILEX 4 MG: 4 GUM, CHEWING ORAL at 18:20

## 2023-11-24 RX ADMIN — HALOPERIDOL 15 MG: 5 TABLET ORAL at 17:23

## 2023-11-24 RX ADMIN — ESCITALOPRAM OXALATE 10 MG: 10 TABLET, FILM COATED ORAL at 08:33

## 2023-11-24 RX ADMIN — POLYETHYLENE GLYCOL 3350 17 G: 17 POWDER, FOR SOLUTION ORAL at 08:34

## 2023-11-24 RX ADMIN — MIRTAZAPINE 45 MG: 30 TABLET, FILM COATED ORAL at 21:28

## 2023-11-24 RX ADMIN — HALOPERIDOL 10 MG: 10 TABLET ORAL at 08:33

## 2023-11-24 RX ADMIN — OLANZAPINE 20 MG: 10 TABLET, FILM COATED ORAL at 21:28

## 2023-11-24 RX ADMIN — BENZTROPINE MESYLATE 0.5 MG: 0.5 TABLET ORAL at 08:33

## 2023-11-24 NOTE — PROGRESS NOTES
"Progress Note - Behavioral Health     Alberto Berumen 28 y.o. male MRN: [id number]   Unit/Bed#: -02 Encounter: [id number]    Behavior over the last 24 hours: limited improvement.     Alberto continues to report depressive symptoms and rates mood as 5 on a scale of 1 (best mood) to 10 (worst mood). He also still has auditory hallucinations of \"voices saying that I am going to hell\", still endorses suicidal thoughts without plan today. He contracts for safety on inpatient unit. He has started to attend a few groups as per staff report. Compliant with medications.    Sleep: normal  Appetite: normal  Medication side effects: No   ROS: no complaints, denies any shortness of breath, chest pain, or abdominal pain, all other systems are negative    Mental Status Evaluation:    Appearance:  casually dressed   Behavior:  guarded, poor eye contact, psychomotor retardation   Speech:  scant, soft   Mood:  depressed   Affect:  flat   Thought Process:  concrete   Associations: concrete associations   Thought Content:  paranoid ideation   Perceptual Disturbances: auditory hallucinations of \"voices saying that I am going to hell\", no visual hallucinations   Risk Potential: Suicidal ideation - Yes, without plan, contracts for safety on the unit  Homicidal ideation - None  Potential for aggression - No   Sensorium:  oriented to person, place, and time/date   Memory:  recent and remote memory grossly intact   Consciousness:  alert and awake   Attention/Concentration: decreased concentration and decreased attention span   Insight:  impaired   Judgment: impaired   Gait/Station: normal gait/station, normal balance   Motor Activity: no abnormal movements     Vital signs in last 24 hours:    Temp:  [97.7 °F (36.5 °C)-98 °F (36.7 °C)] 97.7 °F (36.5 °C)  HR:  [69-89] 69  Resp:  [16] 16  BP: (106-132)/(59-71) 106/59    Laboratory results: I have personally reviewed all pertinent laboratory/tests results    Results from the past 24 " hours: No results found for this or any previous visit (from the past 24 hour(s)).    Suicide/Homicide Risk Assessment:    Risk of Harm to Self:   Nursing Suicide Risk Assessment Last 24 hours: C-SSRS Risk (Since Last Contact)  Calculated C-SSRS Risk Score (Since Last Contact): No Risk Indicated  Current Specific Risk Factors include: has suicidal ideation without a plan, mental illness diagnosis, current depressive symptoms, current psychotic symptoms  Protective Factors: ability to communicate with staff on the unit, able to contract for safety on the unit, taking medications as ordered on the unit  Based on today's assessment, Alberto presents the following risk of harm to self: low    Risk of Harm to Others:  Nursing Homicide Risk Assessment: Violence Risk to Others: Denies within past 6 months  Based on today's assessment, Alberto presents the following risk of harm to others: low    The following interventions are recommended: behavioral checks every 7 minutes, continued hospitalization on locked unit    Progress Toward Goals: limited improvement, still depressed, poor motivation, still has passive suicidal thoughts, still reports psychotic symptoms, starting to attends some groups     Assessment/Plan   Principal Problem:    Schizoaffective disorder, bipolar type (HCC)  Active Problems:    GERD (gastroesophageal reflux disease)    Medical clearance for psychiatric admission    Tobacco abuse    T wave inversion in EKG    Chronic idiopathic constipation    Vitamin B 12 deficiency    Vitamin D deficiency      Recommended Treatment:     Planned medication and treatment changes:    All current active medications have been reviewed  Encourage group therapy, milieu therapy and occupational therapy  Behavioral Health checks every 7 minutes  He refuses trial of Clozaril due to the need for lab monitoring  Recheck Lithium level and BMP today    Continue all other medications:    Current Facility-Administered Medications    Medication Dose Route Frequency Provider Last Rate    acetaminophen  650 mg Oral Q6H PRN Yasmin Harvey MD      acetaminophen  650 mg Oral Q4H PRN Yasmin Harvey MD      acetaminophen  975 mg Oral Q6H PRN Yasmin Harvey MD      aluminum-magnesium hydroxide-simethicone  30 mL Oral Q4H PRN Yasmin Harvey MD      haloperidol lactate  2.5 mg Intramuscular Q4H PRN Max 4/day Yasmin Harvey MD      And    LORazepam  1 mg Intramuscular Q4H PRN Max 4/day Yasmin Harvey MD      And    benztropine  0.5 mg Intramuscular Q4H PRN Max 4/day Yasmin Harvey MD      haloperidol lactate  5 mg Intramuscular Q4H PRN Max 4/day Yasmin Harvey MD      And    LORazepam  2 mg Intramuscular Q4H PRN Max 4/day Yasmin Harvey MD      And    benztropine  1 mg Intramuscular Q4H PRN Max 4/day Yasmin Harvey MD      benztropine  0.5 mg Oral BID La Flores MD      benztropine  1 mg Oral Q4H PRN Max 6/day Yasmin Harvey MD      bisacodyl  10 mg Rectal Daily PRN Yasmin Harvey MD      cholecalciferol  2,000 Units Oral Daily HOLLI Monge      cyanocobalamin  1,000 mcg Oral Daily HOLLI Monge      hydrOXYzine HCL  50 mg Oral Q6H PRN Max 4/day Yasmin Harvey MD      Or    diphenhydrAMINE  50 mg Intramuscular Q6H PRN Yasmin Harvey MD      escitalopram  10 mg Oral Daily La Flores MD      haloperidol  1 mg Oral Q6H PRN Yasmin Harvey MD      haloperidol  10 mg Oral Daily La Flores MD      haloperidol  15 mg Oral QPM La Flores MD      haloperidol  2.5 mg Oral Q4H PRN Max 4/day Yasmin Harvey MD      haloperidol  5 mg Oral Q4H PRN Max 4/day Yasmin Harvey MD      hydrOXYzine HCL  100 mg Oral Q6H PRN Max 4/day Yasmin Harvey MD      Or    LORazepam  2 mg Intramuscular Q6H PRN Yasmin Harvey MD      hydrOXYzine HCL  25 mg Oral Q6H PRN Max 4/day Yasmin Harvey MD      lithium carbonate  900 mg Oral HS La Flores MD       melatonin  3 mg Oral HS Yasmin Harvey MD      mirtazapine  45 mg Oral HS La Flores MD      nicotine polacrilex  4 mg Oral Q2H PRN Yasmin Harvey MD      OLANZapine  20 mg Oral HS La Flores MD      polyethylene glycol  17 g Oral Daily PRN Yasmin Harvey MD      polyethylene glycol  17 g Oral Daily HOLLI Monge      senna-docusate sodium  1 tablet Oral Daily PRN Yasmin Harvey MD      traZODone  50 mg Oral HS PRN Yasmin Harvey MD       Risks / Benefits of Treatment:    Risks, benefits, and possible side effects of medications explained to patient. Patient has limited understanding of risks and benefits of treatment at this time, but agrees to take medications as prescribed.    Counseling / Coordination of Care:    Patient's progress discussed with staff in treatment team meeting.  Medications, treatment progress and treatment plan reviewed with patient.  Medication education provided to patient.    La Flores MD 11/24/23

## 2023-11-24 NOTE — PROGRESS NOTES
11/24/23 1100   Team Meeting   Meeting Type Daily Rounds   Team Members Present   Team Members Present Physician;Nurse;   Physician Team Member Mark   Nursing Team Member Giancarlo   Care Management Team Member Miguelito   Patient/Family Present   Patient Present No   Patient's Family Present No     Patient currently holds 201 status. Patient is medication and meal compliant. Patient denies SI/HI but confirms AH. Patient will continue taking lexapro 10mg daily, Hadol 10mg daily, Hadol 15mg HS,  Lithium 900mg HS, Remeron 45mg HS, Melatonin 3mg HS, and Zyprexa 20mg HS. Patient discharge date and plan is to be determined.

## 2023-11-24 NOTE — PROGRESS NOTES
"Progress Note - Behavioral Health     Alberto Berumen 28 y.o. male MRN: [id number]   Unit/Bed#: U 383-02 Encounter: [id number]    Behavior over the last 24 hours: limited improvement.     Alberto states he feels less depressed and rates mood as 5 on a scale of 1 (best mood) to 10 (worst mood), but still reports auditory hallucinations of \"voices saying that they are going to kill me and my family and that I am going to hell\". He remains seclusive to his room, still has poor eye contact and flat affect. Minimal participation in milieu. Has been taking medications.    Sleep: normal  Appetite: normal  Medication side effects: No   ROS: no complaints, denies any shortness of breath, chest pain, or abdominal pain, all other systems are negative    Mental Status Evaluation:    Appearance:  casually dressed   Behavior:  guarded, poor eye contact, psychomotor retardation   Speech:  scant, soft   Mood:  less depressed   Affect:  flat   Thought Process:  concrete   Associations: concrete associations   Thought Content:  paranoid ideation   Perceptual Disturbances: auditory hallucinations of \"voices saying that they are going to kill me and my family and that I am going to hell\", no visual hallucinations   Risk Potential: Suicidal ideation - None at present  Homicidal ideation - None  Potential for aggression - No   Sensorium:  oriented to person, place, and time/date   Memory:  recent and remote memory grossly intact   Consciousness:  alert and awake   Attention/Concentration: decreased concentration and decreased attention span   Insight:  impaired   Judgment: impaired   Gait/Station: normal gait/station, normal balance   Motor Activity: no abnormal movements     Vital signs in last 24 hours:    Temp:  [97.5 °F (36.4 °C)-98.1 °F (36.7 °C)] 97.5 °F (36.4 °C)  HR:  [] 101  Resp:  [16] 16  BP: (125-136)/(85-87) 125/87    Laboratory results: I have personally reviewed all pertinent laboratory/tests results    BMP:   Lab " Results   Component Value Date    SODIUM 136 11/24/2023    K 4.6 11/24/2023     11/24/2023    CO2 26 11/24/2023    AGAP 10 11/24/2023    BUN 11 11/24/2023    CREATININE 1.03 11/24/2023    GLUC 140 11/24/2023    GLUF 97 11/15/2023    CALCIUM 10.1 11/24/2023    EGFR 99 11/24/2023   Lithium:   Lab Results   Component Value Date    LITHIUM 0.6 11/24/2023       Suicide/Homicide Risk Assessment:    Risk of Harm to Self:   Nursing Suicide Risk Assessment Last 24 hours: C-SSRS Risk (Since Last Contact)  Calculated C-SSRS Risk Score (Since Last Contact): No Risk Indicated  Current Specific Risk Factors include: mental illness diagnosis, current depressive symptoms, current psychotic symptoms  Protective Factors: no current suicidal ideation, ability to communicate with staff on the unit, taking medications as ordered on the unit  Based on today's assessment, Alberto presents the following risk of harm to self: low    Risk of Harm to Others:  Nursing Homicide Risk Assessment: Violence Risk to Others: Denies within past 6 months  Based on today's assessment, Alberto presents the following risk of harm to others: low    The following interventions are recommended: behavioral checks every 7 minutes, continued hospitalization on locked unit    Progress Toward Goals: limited progress today, less depressed, still paranoid, denies current suicidal thoughts, still has psychotic symptoms    Assessment/Plan   Principal Problem:    Schizoaffective disorder, bipolar type (HCC)  Active Problems:    GERD (gastroesophageal reflux disease)    Medical clearance for psychiatric admission    Tobacco abuse    T wave inversion in EKG    Chronic idiopathic constipation    Vitamin B 12 deficiency    Vitamin D deficiency      Recommended Treatment:     Planned medication and treatment changes:    All current active medications have been reviewed  Encourage group therapy, milieu therapy and occupational therapy  Behavioral Health checks every 7  minutes  Recheck Lithium level tomorrow - consider increasing Lithium dose if level is still 0.6    Continue all other medications:    Current Facility-Administered Medications   Medication Dose Route Frequency Provider Last Rate    acetaminophen  650 mg Oral Q6H PRN Yasmin Harvey MD      acetaminophen  650 mg Oral Q4H PRN Yasmin Harvey MD      acetaminophen  975 mg Oral Q6H PRN Yasmin Harvey MD      aluminum-magnesium hydroxide-simethicone  30 mL Oral Q4H PRN Yasmin Harvey MD      haloperidol lactate  2.5 mg Intramuscular Q4H PRN Max 4/day Yasmin Harvey MD      And    LORazepam  1 mg Intramuscular Q4H PRN Max 4/day Yasmin Harvey MD      And    benztropine  0.5 mg Intramuscular Q4H PRN Max 4/day Yasmin Harvey MD      haloperidol lactate  5 mg Intramuscular Q4H PRN Max 4/day Yasmin Harvey MD      And    LORazepam  2 mg Intramuscular Q4H PRN Max 4/day Yasmin Harvey MD      And    benztropine  1 mg Intramuscular Q4H PRN Max 4/day Yasmin Harvey MD      benztropine  0.5 mg Oral BID La Flores MD      benztropine  1 mg Oral Q4H PRN Max 6/day Yasmin Harvey MD      bisacodyl  10 mg Rectal Daily PRN Yasmin Harvey MD      cholecalciferol  2,000 Units Oral Daily HOLLI Monge      cyanocobalamin  1,000 mcg Oral Daily HOLLI Monge      hydrOXYzine HCL  50 mg Oral Q6H PRN Max 4/day Yasmin Harvey MD      Or    diphenhydrAMINE  50 mg Intramuscular Q6H PRN Yasmin Harvey MD      escitalopram  10 mg Oral Daily La Flores MD      haloperidol  1 mg Oral Q6H PRN Yasmin Harvey MD      haloperidol  10 mg Oral Daily La Flores MD      haloperidol  15 mg Oral QPM La Flores MD      haloperidol  2.5 mg Oral Q4H PRN Max 4/day Yasmin Harvey MD      haloperidol  5 mg Oral Q4H PRN Max 4/day Yasmin Harvey MD      hydrOXYzine HCL  100 mg Oral Q6H PRN Max 4/day Yasmni Harvey MD      Or    LORazepam  2 mg  Intramuscular Q6H PRN Yasmin Harvey MD      hydrOXYzine HCL  25 mg Oral Q6H PRN Max 4/day Yasmin Harvey MD      lithium carbonate  900 mg Oral HS La Flores MD      melatonin  3 mg Oral HS Yasmin Harvey MD      mirtazapine  45 mg Oral HS La Flores MD      nicotine polacrilex  4 mg Oral Q2H PRN Yasmin Harvey MD      OLANZapine  20 mg Oral HS La Flores MD      polyethylene glycol  17 g Oral Daily PRN Yasmin Harvey MD      polyethylene glycol  17 g Oral Daily HOLLI Monge      senna-docusate sodium  1 tablet Oral Daily PRN Yasmin Harvey MD      traZODone  50 mg Oral HS PRN Yasmin Harvey MD       Risks / Benefits of Treatment:    Risks, benefits, and possible side effects of medications explained to patient. Patient has limited understanding of risks and benefits of treatment at this time, but agrees to take medications as prescribed.    Counseling / Coordination of Care:    Patient's progress discussed with staff in treatment team meeting.  Medications, treatment progress and treatment plan reviewed with patient.    La Flores MD 11/25/23

## 2023-11-24 NOTE — NURSING NOTE
The patient was out in the milieu all shift and participated in group.  Denies SI, HI,and VH.  He stated that he hears voices but they are easier to control when he is out in the milieu.

## 2023-11-24 NOTE — NURSING NOTE
Patient is calm and cooperative upon approach. Patient is isolative to room, laying down. Patient reported AH of voices telling patient someone is going to kill my family. Reassurance provided. Patient denies other psych symptoms. Patient is compliant with meds and meals.

## 2023-11-24 NOTE — NURSING NOTE
"Pt slept through and missed breakfast,  compliant with medication schedule. Denies HI/SI/VH.  Pt endorses AH of voices telling him to \"go to hell\", but otherwise calm and cooperative.  Pt pt reports lat Bm, yesterday afternoon.    "

## 2023-11-24 NOTE — PROGRESS NOTES
Progress Note - Behavioral Health   Alberto Berumen 28 y.o. male MRN: @MRN   Unit/Bed#: -02 Encounter: [id number]      Report from staff regarding this patient received and discussed, and records reviewed prior to seeing this patient  Behavior over the last 24 hours: unchanged.  The patient spent most of the time in his room, was found sitting in the bed, likely responding to internal stimuli but was able to communicate with the writer, with some visible delay in his responses.  Denied suicidal thoughts today.  He denied any side effects of his medications.    Patient remains anxious, bizarre, calm, distracted, and seclusive today.    Sleep: improved  Appetite: fair  Medication side effects: No       Mental Status Evaluation:    Appearance:  dressed in hospital attire   Mood:  depressed, anxious   Affect: constricted    Speech:  decreased rate, slow   Thought Content:  paranoid ideation, negative thinking, poverty of thought   Perceptual Disturbances: auditory hallucinations   Risk Potential: Suicidal ideation - None, contracts for safety on the unit   Insight:  impaired due to psychosis   Motor Activity: no abnormal movements       Laboratory results:  I have personally reviewed all pertinent laboratory results.    Progress Toward Goals: no significant improvement    Assessment/Plan   Principal Problem:    Schizoaffective disorder, bipolar type ([facility])  Active Problems:    GERD (gastroesophageal reflux disease)    Medical clearance for psychiatric admission    Tobacco abuse    T wave inversion in EKG    Chronic idiopathic constipation    Vitamin B 12 deficiency    Vitamin D deficiency    Recommended Treatment: The patient admitted having auditory hallucinations, was seclusive, his Zyprexa was recently increased.  The writer did not make medication changes today.  Provided supportive therapy    Planned medication and treatment changes:    All current active medications have been reviewed.  Continue treatment  "with group therapy, milieu therapy, occupational therapy and medication management.      ** Please Note: This note has been constructed using a voice recognition system. **      BMP: No results for input(s): \"NA\", \"K\", \"CL\", \"CO2\", \"BUN\" in the last 72 hours.    Invalid input(s): \"CREA\", \"GLU\"  Vitals:    11/23/23 1543   BP: 132/71   Pulse: 89   Resp: 16   Temp: 98 °F (36.7 °C)   SpO2: 98%        Medication Administration - last 24 hours from 11/22/2023 2119 to 11/23/2023 2119         Date/Time Order Dose Route Action Action by     11/23/2023 1750 EST nicotine polacrilex (NICORETTE) gum 4 mg 4 mg Oral Given Lex Tao RN     11/23/2023 2114 EST melatonin tablet 3 mg 3 mg Oral Given Rachele Zepeda RN     11/22/2023 2136 EST melatonin tablet 3 mg 3 mg Oral Given Mavis Covarrubias RN     11/23/2023 1012 EST acetaminophen (TYLENOL) tablet 650 mg 650 mg Oral Given Lex Tao RN     11/23/2023 0929 EST cyanocobalamin (VITAMIN B-12) tablet 1,000 mcg 1,000 mcg Oral Given Lex Tao RN     11/23/2023 0929 EST cholecalciferol (VITAMIN D3) tablet 2,000 Units 2,000 Units Oral Given Lex Tao RN     11/23/2023 0929 EST polyethylene glycol (MIRALAX) packet 17 g 17 g Oral Given Lex Tao RN     11/23/2023 1745 EST benztropine (COGENTIN) tablet 0.5 mg 0.5 mg Oral Given Lex Tao RN     11/23/2023 0929 EST benztropine (COGENTIN) tablet 0.5 mg 0.5 mg Oral Given Lex Tao RN     11/23/2023 0929 EST escitalopram (LEXAPRO) tablet 10 mg 10 mg Oral Given Lex Tao RN     11/23/2023 2112 EST OLANZapine (ZyPREXA) tablet 20 mg 20 mg Oral Given Rachele Zepeda RN     11/22/2023 2136 EST OLANZapine (ZyPREXA) tablet 20 mg 20 mg Oral Given Mavis Covarrubias RN     11/23/2023 2113 EST mirtazapine (REMERON) tablet 45 mg 45 mg Oral Given Rachele Zepeda RN     11/22/2023 2136 EST mirtazapine (REMERON) tablet 45 mg 45 mg Oral Given Mavis Covarrubias RN     11/23/2023 2113 EST lithium carbonate " (LITHOBID) CR tablet 900 mg 900 mg Oral Given Rachele Zepeda RN     11/22/2023 2136 EST lithium carbonate (LITHOBID) CR tablet 900 mg 900 mg Oral Given Mavis Covarrubias RN     11/23/2023 0929 EST haloperidol (HALDOL) tablet 10 mg 10 mg Oral Given Lex Tao RN     11/23/2023 1744 EST haloperidol (HALDOL) tablet 15 mg 15 mg Oral Given Lex Tao RN

## 2023-11-24 NOTE — CASE MANAGEMENT
Writer checked in with patient. Patient reports that he is doing okay, but still hearing voices. Patient disclosed voices aren't as loud as they were on admission. Patient offered no complaints questions comments or concerns at this time.

## 2023-11-25 PROCEDURE — 99232 SBSQ HOSP IP/OBS MODERATE 35: CPT | Performed by: PSYCHIATRY & NEUROLOGY

## 2023-11-25 RX ADMIN — NICOTINE POLACRILEX 4 MG: 4 GUM, CHEWING ORAL at 13:57

## 2023-11-25 RX ADMIN — ESCITALOPRAM OXALATE 10 MG: 10 TABLET, FILM COATED ORAL at 08:54

## 2023-11-25 RX ADMIN — POLYETHYLENE GLYCOL 3350 17 G: 17 POWDER, FOR SOLUTION ORAL at 08:58

## 2023-11-25 RX ADMIN — LITHIUM CARBONATE 900 MG: 450 TABLET, EXTENDED RELEASE ORAL at 21:36

## 2023-11-25 RX ADMIN — HALOPERIDOL 15 MG: 5 TABLET ORAL at 17:28

## 2023-11-25 RX ADMIN — OLANZAPINE 20 MG: 10 TABLET, FILM COATED ORAL at 21:36

## 2023-11-25 RX ADMIN — MELATONIN TAB 3 MG 3 MG: 3 TAB at 21:36

## 2023-11-25 RX ADMIN — BENZTROPINE MESYLATE 0.5 MG: 0.5 TABLET ORAL at 17:28

## 2023-11-25 RX ADMIN — BENZTROPINE MESYLATE 0.5 MG: 0.5 TABLET ORAL at 08:54

## 2023-11-25 RX ADMIN — MIRTAZAPINE 45 MG: 30 TABLET, FILM COATED ORAL at 21:36

## 2023-11-25 RX ADMIN — CHOLECALCIFEROL TAB 25 MCG (1000 UNIT) 2000 UNITS: 25 TAB at 08:54

## 2023-11-25 RX ADMIN — HALOPERIDOL 10 MG: 10 TABLET ORAL at 08:54

## 2023-11-25 RX ADMIN — CYANOCOBALAMIN TAB 1000 MCG 1000 MCG: 1000 TAB at 08:54

## 2023-11-25 NOTE — NURSING NOTE
Pt calm pleasant and cooperative, Pt isolated in room in bed all shift , Pt denied all pain anxiety and depression, Pt denies HI and SI , Pt denied AH and VH, Pt took all scheduled HS meds, Q7 min safety checks maintained

## 2023-11-25 NOTE — NURSING NOTE
"Pt is calm with a depressed affect reporting AH of voices \"telling me I'm going to die.\" Withdrawn to bed throughout the morning. Scant in speech. Denies anxiety, SI/HI/VH.   "

## 2023-11-26 LAB — LITHIUM SERPL-SCNC: 0.6 MMOL/L (ref 0.6–1.2)

## 2023-11-26 PROCEDURE — 80178 ASSAY OF LITHIUM: CPT | Performed by: PSYCHIATRY & NEUROLOGY

## 2023-11-26 PROCEDURE — 99232 SBSQ HOSP IP/OBS MODERATE 35: CPT | Performed by: PSYCHIATRY & NEUROLOGY

## 2023-11-26 RX ADMIN — HALOPERIDOL 15 MG: 5 TABLET ORAL at 17:28

## 2023-11-26 RX ADMIN — LITHIUM CARBONATE 900 MG: 450 TABLET, EXTENDED RELEASE ORAL at 21:44

## 2023-11-26 RX ADMIN — MELATONIN TAB 3 MG 3 MG: 3 TAB at 21:44

## 2023-11-26 RX ADMIN — BENZTROPINE MESYLATE 0.5 MG: 0.5 TABLET ORAL at 08:22

## 2023-11-26 RX ADMIN — ESCITALOPRAM OXALATE 10 MG: 10 TABLET, FILM COATED ORAL at 08:22

## 2023-11-26 RX ADMIN — MIRTAZAPINE 45 MG: 30 TABLET, FILM COATED ORAL at 21:45

## 2023-11-26 RX ADMIN — POLYETHYLENE GLYCOL 3350 17 G: 17 POWDER, FOR SOLUTION ORAL at 08:22

## 2023-11-26 RX ADMIN — OLANZAPINE 20 MG: 10 TABLET, FILM COATED ORAL at 21:44

## 2023-11-26 RX ADMIN — CYANOCOBALAMIN TAB 1000 MCG 1000 MCG: 1000 TAB at 08:22

## 2023-11-26 RX ADMIN — BENZTROPINE MESYLATE 0.5 MG: 0.5 TABLET ORAL at 17:28

## 2023-11-26 RX ADMIN — HALOPERIDOL 10 MG: 10 TABLET ORAL at 08:22

## 2023-11-26 RX ADMIN — CHOLECALCIFEROL TAB 25 MCG (1000 UNIT) 2000 UNITS: 25 TAB at 08:22

## 2023-11-26 NOTE — NURSING NOTE
Pt is alert and able to make needs known. Visible in the dining room reading a book at the table. Remained isolated to self. Pleasant and cooperative. Denies anxiety, depression, SI/HI/AVH. Medications administered and tolerated. Q7 min safety checks continued.

## 2023-11-26 NOTE — PROGRESS NOTES
"Progress Note - Behavioral Health     Alberto Berumen 28 y.o. male MRN: [id number]   Unit/Bed#: U 383-02 Encounter: [id number]    Behavior over the last 24 hours: limited improvement.     Alberto reports less prominent depressive symptoms and rates his mood as 5 on a scale of 1 (best mood) to 10 (worst mood). He denies current suicidal thoughts. He states that auditory hallucinations are still present otherwise \"they are the same, telling me that I am going to hell and that they are going to kill me and my family\". He still has poor eye contact and flat affect. Limited group attendance. Compliant with medications.    Sleep: normal  Appetite: normal  Medication side effects: No   ROS: no complaints, denies any shortness of breath, chest pain, or abdominal pain, all other systems are negative    Mental Status Evaluation:    Appearance:  casually dressed   Behavior:  guarded, poor eye contact   Speech:  scant, soft   Mood:  less depressed   Affect:  flat   Thought Process:  concrete   Associations: concrete associations   Thought Content:  paranoid ideation   Perceptual Disturbances: auditory hallucinations of \"voices saying that I am going to hell and that they are going to kill me and my family\", no visual hallucinations   Risk Potential: Suicidal ideation - None at present  Homicidal ideation - None at present  Potential for aggression - No   Sensorium:  oriented to person, place, and time/date   Memory:  recent and remote memory grossly intact   Consciousness:  alert and awake   Attention/Concentration: decreased concentration and decreased attention span   Insight:  impaired   Judgment: impaired   Gait/Station: normal gait/station, normal balance   Motor Activity: no abnormal movements     Vital signs in last 24 hours:    Temp:  [98.3 °F (36.8 °C)] 98.3 °F (36.8 °C)  HR:  [] 78  Resp:  [16] 16  BP: (130-139)/() 135/90    Laboratory results: I have personally reviewed all pertinent laboratory/tests " results    Results from the past 24 hours: No results found for this or any previous visit (from the past 24 hour(s)).    Suicide/Homicide Risk Assessment:    Risk of Harm to Self:   Nursing Suicide Risk Assessment Last 24 hours: C-SSRS Risk (Since Last Contact)  Calculated C-SSRS Risk Score (Since Last Contact): No Risk Indicated  Current Specific Risk Factors include: mental illness diagnosis, current psychotic symptoms  Protective Factors: no current suicidal ideation, ability to communicate with staff on the unit, taking medications as ordered on the unit  Based on today's assessment, Alberto presents the following risk of harm to self: low    Risk of Harm to Others:  Nursing Homicide Risk Assessment: Violence Risk to Others: Denies within past 6 months  Based on today's assessment, Alberto presents the following risk of harm to others: low    The following interventions are recommended: behavioral checks every 7 minutes, continued hospitalization on locked unit    Progress Toward Goals: limited progress, less depressed, poor motivation, denies current suicidal thoughts, continues to report psychotic symptoms    Assessment/Plan   Principal Problem:    Schizoaffective disorder, bipolar type (HCC)  Active Problems:    GERD (gastroesophageal reflux disease)    Medical clearance for psychiatric admission    Tobacco abuse    T wave inversion in EKG    Chronic idiopathic constipation    Vitamin B 12 deficiency    Vitamin D deficiency      Recommended Treatment:     Planned medication and treatment changes:    All current active medications have been reviewed  Encourage group therapy, milieu therapy and occupational therapy  Behavioral Health checks every 7 minutes  Reorder Lithium level today - plan to increase Lithium dose to 1200 mg at bedtime for mood stabilization if Lithium level is still 0.6    Continue all other medications:    Current Facility-Administered Medications   Medication Dose Route Frequency Provider  Last Rate    acetaminophen  650 mg Oral Q6H PRN Yasmin Harvey MD      acetaminophen  650 mg Oral Q4H PRN Yasmin Harvey MD      acetaminophen  975 mg Oral Q6H PRN Yasmin Harvey MD      aluminum-magnesium hydroxide-simethicone  30 mL Oral Q4H PRN Yasmin Harvey MD      haloperidol lactate  2.5 mg Intramuscular Q4H PRN Max 4/day Yasmin Harvey MD      And    LORazepam  1 mg Intramuscular Q4H PRN Max 4/day Yasmin Harvey MD      And    benztropine  0.5 mg Intramuscular Q4H PRN Max 4/day Yasmin Harvey MD      haloperidol lactate  5 mg Intramuscular Q4H PRN Max 4/day Yasmin Harvey MD      And    LORazepam  2 mg Intramuscular Q4H PRN Max 4/day Yasmin Harvey MD      And    benztropine  1 mg Intramuscular Q4H PRN Max 4/day Yasmin Harvey MD      benztropine  0.5 mg Oral BID La Flores MD      benztropine  1 mg Oral Q4H PRN Max 6/day Ysamin Harvey MD      bisacodyl  10 mg Rectal Daily PRN Yasmin Harvey MD      cholecalciferol  2,000 Units Oral Daily HOLLI Monge      cyanocobalamin  1,000 mcg Oral Daily HOLLI Monge      hydrOXYzine HCL  50 mg Oral Q6H PRN Max 4/day Yasmin Harvey MD      Or    diphenhydrAMINE  50 mg Intramuscular Q6H PRN Yasmin Harvey MD      [START ON 11/27/2023] escitalopram  15 mg Oral Daily La Flores MD      haloperidol  1 mg Oral Q6H PRN Yasmin Harvey MD      haloperidol  10 mg Oral Daily La Flores MD      haloperidol  15 mg Oral QPM La Flores MD      haloperidol  2.5 mg Oral Q4H PRN Max 4/day Yasmin Harvey MD      haloperidol  5 mg Oral Q4H PRN Max 4/day Yasmin Harvey MD      hydrOXYzine HCL  100 mg Oral Q6H PRN Max 4/day Yasmin Harvey MD      Or    LORazepam  2 mg Intramuscular Q6H PRN Yasmin Harvey MD      hydrOXYzine HCL  25 mg Oral Q6H PRN Max 4/day Yasmin Harvey MD      lithium carbonate  900 mg Oral HS La Flores MD      melatonin  3 mg Oral HS  Yasmin Harvey MD      mirtazapine  45 mg Oral HS La Flores MD      nicotine polacrilex  4 mg Oral Q2H PRN Yasmin Harvey MD      OLANZapine  20 mg Oral HS La Flores MD      polyethylene glycol  17 g Oral Daily PRN Yasmin Harvey MD      polyethylene glycol  17 g Oral Daily HOLLI Monge      senna-docusate sodium  1 tablet Oral Daily PRN Yasmin Harvey MD      traZODone  50 mg Oral HS PRN Yasmin Harvey MD       Risks / Benefits of Treatment:    Risks, benefits, and possible side effects of medications explained to patient. Patient has limited understanding of risks and benefits of treatment at this time, but agrees to take medications as prescribed.    Counseling / Coordination of Care:    Patient's progress discussed with staff in treatment team meeting.  Medications, treatment progress and treatment plan reviewed with patient.    La Flores MD 11/26/23

## 2023-11-26 NOTE — NURSING NOTE
"Pt calm with scant, soft speech, a depressed and blunted affect, and poor eye contact reporting AH saying, \"They're going to kill my family and that I'm going to die.\" Pt says he believes this is going to happen. Pt began to doubt veracity of statements made by voices after it was asked if the statements made had come true. Explained to pt that although voices are real to him, it does not mean what they are saying is going to come true. Pt appeared open to this idea.      Denies anxiety or SI/HI/VH. No unmet needs at this time.    "

## 2023-11-27 PROCEDURE — 99232 SBSQ HOSP IP/OBS MODERATE 35: CPT | Performed by: STUDENT IN AN ORGANIZED HEALTH CARE EDUCATION/TRAINING PROGRAM

## 2023-11-27 RX ADMIN — HALOPERIDOL 15 MG: 5 TABLET ORAL at 17:36

## 2023-11-27 RX ADMIN — BENZTROPINE MESYLATE 0.5 MG: 0.5 TABLET ORAL at 08:46

## 2023-11-27 RX ADMIN — NICOTINE POLACRILEX 4 MG: 4 GUM, CHEWING ORAL at 18:37

## 2023-11-27 RX ADMIN — MIRTAZAPINE 45 MG: 30 TABLET, FILM COATED ORAL at 21:13

## 2023-11-27 RX ADMIN — BENZTROPINE MESYLATE 0.5 MG: 0.5 TABLET ORAL at 17:36

## 2023-11-27 RX ADMIN — OLANZAPINE 20 MG: 10 TABLET, FILM COATED ORAL at 21:13

## 2023-11-27 RX ADMIN — CHOLECALCIFEROL TAB 25 MCG (1000 UNIT) 2000 UNITS: 25 TAB at 08:45

## 2023-11-27 RX ADMIN — ESCITSLOPRAM 15 MG: 5 TABLET ORAL at 08:45

## 2023-11-27 RX ADMIN — HALOPERIDOL 10 MG: 10 TABLET ORAL at 08:45

## 2023-11-27 RX ADMIN — LITHIUM CARBONATE 1200 MG: 450 TABLET, EXTENDED RELEASE ORAL at 21:13

## 2023-11-27 RX ADMIN — CYANOCOBALAMIN TAB 1000 MCG 1000 MCG: 1000 TAB at 08:45

## 2023-11-27 RX ADMIN — POLYETHYLENE GLYCOL 3350 17 G: 17 POWDER, FOR SOLUTION ORAL at 08:49

## 2023-11-27 RX ADMIN — MELATONIN TAB 3 MG 3 MG: 3 TAB at 21:12

## 2023-11-27 NOTE — NURSING NOTE
Patient calm and cooperative, medication compliant. Patient denies psychiatric symptoms at this time, denies any unmet needs.

## 2023-11-27 NOTE — NURSING NOTE
Patient napped at the start of shift. Patient completed ADLs then attended group. Patient ate dinner in the dayroom. Patient denied HI/SI at this time. Patient reports AH. Patient reports meds help with my negative thoughts. Patient was compliant with scheduled medications  Staff maintained continuous rounding for safety and support.

## 2023-11-27 NOTE — PROGRESS NOTES
"Progress Note - Behavioral Health     Alberto Berumen 28 y.o. male MRN: [id number]   Unit/Bed#: [facility] [[email or web address] 383-02 Encounter: [id number]    Documentation, nursing notes, medication reconciliation, labs, and vitals reviewed. Patient was seen for continuing care and reviewed with treatment team. No acute events over the past 24 hours.  Per nursing report, continues isolative to his room and continues to complain of hallucinations.  Compliant with medications.    No medication changes over the past 24 hours. Continues to tolerate current medications with no adverse effects.     On evaluation today, he is seen in his room and in bed with the covers over his head.  States he has been napping off and on this morning.  States she continues to experience auditory hallucinations.  Today the voices are telling him \"I should burn in hell\".  States no improvement in hallucinations.  Did discuss with him recent lithium level and plan to increase his lithium to augment the antipsychotic.  Continue history his depression as moderate to severe.  Continues to deny any active suicidal thoughts or passive death wish.  No suicidal ideation, plan, or intent. Does not exhibit any symptoms of aimee on evaluation.    Psychiatric ROS:  Behavior over the last 24 hours: unchanged  Sleep: slept off and on  Appetite: fair  Medication side effects: No   ROS: no complaints, all other systems are negative    Mental Status Evaluation:    Appearance:  marginal hygiene   Behavior:  cooperative, guarded   Speech:  scant   Mood:  dysphoric   Affect:  constricted   Thought Process:  decreased rate of thoughts   Associations: concrete associations   Thought Content:  some paranoia   Perceptual Disturbances: auditory hallucinations   Risk Potential: Suicidal ideation - None  Homicidal ideation - None  Potential for aggression - No   Sensorium:  oriented to person, place, time/date, and situation   Memory:  recent and remote memory grossly intact   Consciousness:  " alert and awake   Attention/Concentration: decreased concentration and decreased attention span   Insight:  limited   Judgment: limited   Gait/Station: in bed   Motor Activity: no abnormal movements     Vital signs in last 24 hours:    Temp:  [98 °F (36.7 °C)] 98 °F (36.7 °C)  HR:  [96] 96  Resp:  [16] 16  BP: (131)/(58) 131/58    Laboratory results: I have personally reviewed all pertinent laboratory/tests results    Results from the past 24 hours:   Recent Results (from the past 24 hour(s))   Lithium level    Collection Time: 11/26/23  8:09 PM   Result Value Ref Range    Lithium Lvl 0.6 0.6 - 1.2 mmol/L       Suicide/Homicide Risk Assessment:    Risk of Harm to Self:   Current Specific Risk Factors include: mental illness diagnosis  Protective Factors: no current suicidal ideation, ability to communicate with staff on the unit, able to contract for safety on the unit, taking medications as ordered on the unit    Risk of Harm to Others:  Current Specific Risk Factors include: current psychotic symptoms  Protective Factors: no current homicidal ideation, able to communicate with staff on the unit, impulse control is improving, compliant with medications on the unit as ordered  Based on today's assessment, Alberto presents the following risk of harm to others: minimal    The following interventions are recommended: behavioral checks every 7 minutes, continued hospitalization on locked unit    Progress Toward Goals: no significant improvement, still psychotic, poor motivation    Assessment/Plan   Principal Problem:    Schizoaffective disorder, bipolar type (HCC)  Active Problems:    GERD (gastroesophageal reflux disease)    Medical clearance for psychiatric admission    Tobacco abuse    T wave inversion in EKG    Chronic idiopathic constipation    Vitamin B 12 deficiency    Vitamin D deficiency      Recommended Treatment:     Planned medication and treatment changes:    All current active medications have been  reviewed  Encourage group therapy, milieu therapy and occupational therapy  Behavioral Health checks every 7 minutes  Increase lithium 1200 mg at bedtime  Recheck Lithium level and BMP in 3 days    Current Facility-Administered Medications   Medication Dose Route Frequency Provider Last Rate    acetaminophen  650 mg Oral Q6H PRN Yasmin Harvey MD      acetaminophen  650 mg Oral Q4H PRN Yasmin Harvey MD      acetaminophen  975 mg Oral Q6H PRN Yasmin Harvey MD      aluminum-magnesium hydroxide-simethicone  30 mL Oral Q4H PRN Yasmin Harvey MD      haloperidol lactate  2.5 mg Intramuscular Q4H PRN Max 4/day Yasmin Harvey MD      And    LORazepam  1 mg Intramuscular Q4H PRN Max 4/day Yasmin Harvey MD      And    benztropine  0.5 mg Intramuscular Q4H PRN Max 4/day Yasmin Harvey MD      haloperidol lactate  5 mg Intramuscular Q4H PRN Max 4/day Yasmin Harvey MD      And    LORazepam  2 mg Intramuscular Q4H PRN Max 4/day Yasmin Harvey MD      And    benztropine  1 mg Intramuscular Q4H PRN Max 4/day Yasmin Harvey MD      benztropine  0.5 mg Oral BID La Flores MD      benztropine  1 mg Oral Q4H PRN Max 6/day Yasmin Harvey MD      bisacodyl  10 mg Rectal Daily PRN Yasmin Harvey MD      cholecalciferol  2,000 Units Oral Daily HOLLI Monge      cyanocobalamin  1,000 mcg Oral Daily HOLLI Monge      hydrOXYzine HCL  50 mg Oral Q6H PRN Max 4/day Yasmin Harvey MD      Or    diphenhydrAMINE  50 mg Intramuscular Q6H PRN Yasmin Harvey MD      escitalopram  15 mg Oral Daily La Flores MD      haloperidol  1 mg Oral Q6H PRN Yasmin Harvey MD      haloperidol  10 mg Oral Daily La Flores MD      haloperidol  15 mg Oral QPM La Flores MD      haloperidol  2.5 mg Oral Q4H PRN Max 4/day Yasmin Harvey MD      haloperidol  5 mg Oral Q4H PRN Max 4/day Yasmin Harvey MD      hydrOXYzine HCL  100 mg Oral Q6H PRN Max  4/day Yasmin Harvey MD      Or    LORazepam  2 mg Intramuscular Q6H PRN Yasmin Harvey MD      hydrOXYzine HCL  25 mg Oral Q6H PRN Max 4/day Yasmin Harvey MD      lithium carbonate  1,200 mg Oral HS HOLLI Anderson      melatonin  3 mg Oral HS Yasmin Harvey MD      mirtazapine  45 mg Oral HS La Flores MD      nicotine polacrilex  4 mg Oral Q2H PRN Yasmin Harvey MD      OLANZapine  20 mg Oral HS La Flores MD      polyethylene glycol  17 g Oral Daily PRN Yasmin Harvey MD      polyethylene glycol  17 g Oral Daily HOLLI Monge      senna-docusate sodium  1 tablet Oral Daily PRN Yasmin Harvey MD      traZODone  50 mg Oral HS PRN Yasmin Harvey MD       Risks / Benefits of Treatment:    Risks, benefits, and possible side effects of medications explained to patient. Patient has limited understanding of risks and benefits of treatment at this time, but agrees to take medications as prescribed.    Counseling / Coordination of Care:    Patient's progress discussed with staff in treatment team meeting.  Medications, treatment progress and treatment plan reviewed with patient.  At this time patient has limited understanding of diagnosis, medication changes and treatment plan and will require further explanation.    HOLLI Anderson 11/27/23

## 2023-11-27 NOTE — PROGRESS NOTES
11/27/23 1524   Team Meeting   Meeting Type Daily Rounds   Team Members Present   Team Members Present Physician;Nurse;   Physician Team Member Jose Luis   Nursing Team Member Earl   Care Management Team Member Miguelito Camarillo   Patient/Family Present   Patient Present No   Patient's Family Present No     Patient currently holds 201 status. Patient is compliant with medications and meals. Patient will continue taking Lexapro 15 mg daily, haldol 15 mg daily, lithium, 1200 HS, Remeron 45mg HS, Zyprexa 20mg and melatonin 3mg HS.Patient discharge date and plan to be determined.

## 2023-11-27 NOTE — NURSING NOTE
"Pt isolated to room,  skipped breakfast.  Present for lunch.  Pt spent most of the afternoon in bed,  compliant with meds.  Pt dressed in personal attire/.  Pt endorses auditory hallucinations, in the form of voices tell him \"that I'm gonna die\".  Pt coping well with prayer and rest.  Pt has not reported unmet needs.    "

## 2023-11-28 PROCEDURE — 99232 SBSQ HOSP IP/OBS MODERATE 35: CPT | Performed by: NURSE PRACTITIONER

## 2023-11-28 RX ADMIN — OLANZAPINE 20 MG: 10 TABLET, FILM COATED ORAL at 21:14

## 2023-11-28 RX ADMIN — POLYETHYLENE GLYCOL 3350 17 G: 17 POWDER, FOR SOLUTION ORAL at 08:47

## 2023-11-28 RX ADMIN — BENZTROPINE MESYLATE 0.5 MG: 0.5 TABLET ORAL at 17:10

## 2023-11-28 RX ADMIN — HALOPERIDOL 15 MG: 5 TABLET ORAL at 17:10

## 2023-11-28 RX ADMIN — LITHIUM CARBONATE 1200 MG: 450 TABLET, EXTENDED RELEASE ORAL at 21:14

## 2023-11-28 RX ADMIN — CYANOCOBALAMIN TAB 1000 MCG 1000 MCG: 1000 TAB at 08:47

## 2023-11-28 RX ADMIN — BENZTROPINE MESYLATE 0.5 MG: 0.5 TABLET ORAL at 08:47

## 2023-11-28 RX ADMIN — ESCITSLOPRAM 15 MG: 5 TABLET ORAL at 08:47

## 2023-11-28 RX ADMIN — MELATONIN TAB 3 MG 3 MG: 3 TAB at 21:14

## 2023-11-28 RX ADMIN — MIRTAZAPINE 45 MG: 30 TABLET, FILM COATED ORAL at 21:14

## 2023-11-28 RX ADMIN — HALOPERIDOL 10 MG: 10 TABLET ORAL at 08:47

## 2023-11-28 RX ADMIN — CHOLECALCIFEROL TAB 25 MCG (1000 UNIT) 2000 UNITS: 25 TAB at 08:47

## 2023-11-28 RX ADMIN — NICOTINE POLACRILEX 4 MG: 4 GUM, CHEWING ORAL at 19:21

## 2023-11-28 NOTE — NURSING NOTE
"Pt isolated to bed.  Pt skipped breakfast which is his routine.  Pt reports AH of voices telling him \"you're gonna die and go to hell.\"  Pt does not believe he is going to hell,  actively praying as coping mechanism.  Pt compliant with medication and present for lunch.  Denies SI/HI/VH.  NO remarkable behaviors observed.   "

## 2023-11-28 NOTE — PROGRESS NOTES
11/28/23 1508   Team Meeting   Meeting Type Daily Rounds   Team Members Present   Team Members Present Physician;Nurse;   Physician Team Member Vin   Nursing Team Member Earl   Care Management Team Member Miguelito Camarillo   Patient/Family Present   Patient Present No   Patient's Family Present No        Patient currently holds 201 status. Patient is medication and meal compliant. Patient denies VH/HI/SI but confirms AH. Patient will continue on Cogention 0.5 mg BID, [id number] mg daily, Haldol 10mg daily, Haldol 15 mg every evening, lithium 1200 mg HS, Melatonin 3 mg HS, Remeron 45 mg HS, and Zyprexa HS. Patient discharge plan and date to be determined.

## 2023-11-28 NOTE — PROGRESS NOTES
Progress Note - Behavioral Health     Alberto Berumen 28 y.o. male MRN: [id number]   Unit/Bed#: [facility] 383-02 Encounter: [id number]    Documentation, nursing notes, medication reconciliation, labs, and vitals reviewed. Patient was seen for continuing care and reviewed with treatment team. No acute events over the past 24 hours.  Per nursing report, isolative to room, more likley to come out in evening, continues to report auditory hallucinations.    Medication changes over the past 24 hours was increased to 1200 mg last night, with repeat level on Thursday. Continues to tolerate current medications with no adverse effects.     On evaluation today, he is seen in his room and resting in bed.  Tends to be more isolative in the morning.  States he often skips breakfast, but will eat his other 2 meals and feels he is eating enough.  He is a little brighter and slightly more animated today.  He continues to experience auditory hallucinations,voices telling him he will die.  But he does report today he is noting a decrease in the frequency and intensity of the voices.  Starting to feel that medications are beginning to help.  Also states feeling medications are helping with his negative thoughts.  Denying any suicidal thoughts or passive death wish.  States he continues to struggle with sleep, but that is usually the case while he is in the hospital.  Bed is too small and uncomfortable.    Psychiatric ROS:  Behavior over the last 24 hours: minimal improvement  Sleep: slept off and on  Appetite: fair  Medication side effects: No   ROS: no complaints, all other systems are negative    Mental Status Evaluation:    Appearance:  age appropriate   Behavior:  cooperative, guarded   Speech:  slow, scant, soft   Mood:  depressed, dysphoric   Affect:  blunted   Thought Process:  decreased rate of thoughts   Associations: concrete associations   Thought Content:  some paranoia   Perceptual Disturbances: auditory hallucinations still  present, but less frequent   Risk Potential: Suicidal ideation - None  Homicidal ideation - None  Potential for aggression - No   Sensorium:  oriented to person, place, time/date, and situation   Memory:  recent and remote memory grossly intact   Consciousness:  alert and awake   Attention/Concentration: decreased concentration and decreased attention span   Insight:  limited   Judgment: limited   Gait/Station: normal gait/station, normal balance   Motor Activity: no abnormal movements     Vital signs in last 24 hours:    Temp:  [97 °F (36.1 °C)-97.5 °F (36.4 °C)] 97 °F (36.1 °C)  HR:  [] 99  Resp:  [16] 16  BP: (138-148)/(78-87) 138/78    Laboratory results: I have personally reviewed all pertinent laboratory/tests results    Results from the past 24 hours: No results found for this or any previous visit (from the past 24 hour(s)).    Suicide/Homicide Risk Assessment:    Risk of Harm to Self:   Current Specific Risk Factors include: mental illness diagnosis, current psychotic symptoms  Protective Factors: no current suicidal ideation, ability to communicate with staff on the unit, able to contract for safety on the unit, taking medications as ordered on the unit  Based on today's assessment, Alberto presents the following risk of harm to self: minimal    Risk of Harm to Others:  Current Specific Risk Factors include: current psychotic symptoms  Protective Factors: no current homicidal ideation, able to communicate with staff on the unit, mood is stabilizing  Based on today's assessment, Alberto presents the following risk of harm to others: minimal    The following interventions are recommended: behavioral checks every 7 minutes, continued hospitalization on locked unit    Progress Toward Goals: limited improvement, attends groups more often, less psychotic    Assessment/Plan   Principal Problem:    Schizoaffective disorder, bipolar type (HCC)  Active Problems:    GERD (gastroesophageal reflux disease)     Medical clearance for psychiatric admission    Tobacco abuse    T wave inversion in EKG    Chronic idiopathic constipation    Vitamin B 12 deficiency    Vitamin D deficiency      Recommended Treatment:     Planned medication and treatment changes:    All current active medications have been reviewed  Encourage group therapy, milieu therapy and occupational therapy  Behavioral Health checks every 7 minutes  Reorder Lithium level and BMP in 2 days  Continue all other medications:    Current Facility-Administered Medications   Medication Dose Route Frequency Provider Last Rate    acetaminophen  650 mg Oral Q6H PRN Yasmin Harvey MD      acetaminophen  650 mg Oral Q4H PRN Yasmin Harvey MD      acetaminophen  975 mg Oral Q6H PRN Yasmin Harvey MD      aluminum-magnesium hydroxide-simethicone  30 mL Oral Q4H PRN Yasmin Harvey MD      haloperidol lactate  2.5 mg Intramuscular Q4H PRN Max 4/day Yasmin Harvey MD      And    LORazepam  1 mg Intramuscular Q4H PRN Max 4/day Yasmin Harvey MD      And    benztropine  0.5 mg Intramuscular Q4H PRN Max 4/day Yasmin Harvey MD      haloperidol lactate  5 mg Intramuscular Q4H PRN Max 4/day Yasmin Harvey MD      And    LORazepam  2 mg Intramuscular Q4H PRN Max 4/day Yasmin Harvey MD      And    benztropine  1 mg Intramuscular Q4H PRN Max 4/day Yasmin Harvey MD      benztropine  0.5 mg Oral BID La Flores MD      benztropine  1 mg Oral Q4H PRN Max 6/day Yasmin Harvey MD      bisacodyl  10 mg Rectal Daily PRN Yasmin Harvey MD      cholecalciferol  2,000 Units Oral Daily HOLLI Monge      cyanocobalamin  1,000 mcg Oral Daily HOLLI Monge      hydrOXYzine HCL  50 mg Oral Q6H PRN Max 4/day Yasmin Harvey MD      Or    diphenhydrAMINE  50 mg Intramuscular Q6H PRN Yasmin Harvey MD      escitalopram  15 mg Oral Daily La Flores MD      haloperidol  1 mg Oral Q6H PRN Yasmin Harvey MD       haloperidol  10 mg Oral Daily La Flores MD      haloperidol  15 mg Oral QPM La Flores MD      haloperidol  2.5 mg Oral Q4H PRN Max 4/day Yasmin Harvey MD      haloperidol  5 mg Oral Q4H PRN Max 4/day Yasmin Harvey MD      hydrOXYzine HCL  100 mg Oral Q6H PRN Max 4/day Yasmin Harvey MD      Or    LORazepam  2 mg Intramuscular Q6H PRN Yasmin Harvey MD      hydrOXYzine HCL  25 mg Oral Q6H PRN Max 4/day Yasmin Harvey MD      lithium carbonate  1,200 mg Oral HS HOLLI Anderson      melatonin  3 mg Oral HS Yasmin Harvey MD      mirtazapine  45 mg Oral HS La Flores MD      nicotine polacrilex  4 mg Oral Q2H PRN Yasmin Harvey MD      OLANZapine  20 mg Oral HS La Flores MD      polyethylene glycol  17 g Oral Daily PRN Yasmin Harvey MD      polyethylene glycol  17 g Oral Daily HOLLI Monge      senna-docusate sodium  1 tablet Oral Daily PRN Yasmin Harvey MD      traZODone  50 mg Oral HS PRN Yasmin Harvey MD       Risks / Benefits of Treatment:    Risks, benefits, and possible side effects of medications explained to patient and patient verbalizes understanding and agreement for treatment.  The patient has a history of at least 3 antipsychotic medication trials and at this time requires treatment with 2 antipsychotic agents (Zyprexa and Haldol) due to failed multiple trials of monotherapy.    Counseling / Coordination of Care:    Patient's progress discussed with staff in treatment team meeting.  Medications, treatment progress and treatment plan reviewed with patient.    HOLLI Anderson 11/28/23

## 2023-11-29 PROCEDURE — 99232 SBSQ HOSP IP/OBS MODERATE 35: CPT | Performed by: NURSE PRACTITIONER

## 2023-11-29 RX ADMIN — POLYETHYLENE GLYCOL 3350 17 G: 17 POWDER, FOR SOLUTION ORAL at 08:25

## 2023-11-29 RX ADMIN — ESCITSLOPRAM 15 MG: 5 TABLET ORAL at 08:23

## 2023-11-29 RX ADMIN — BENZTROPINE MESYLATE 0.5 MG: 0.5 TABLET ORAL at 08:23

## 2023-11-29 RX ADMIN — MIRTAZAPINE 45 MG: 30 TABLET, FILM COATED ORAL at 21:04

## 2023-11-29 RX ADMIN — HALOPERIDOL 10 MG: 10 TABLET ORAL at 08:23

## 2023-11-29 RX ADMIN — HALOPERIDOL 15 MG: 5 TABLET ORAL at 17:32

## 2023-11-29 RX ADMIN — MELATONIN TAB 3 MG 3 MG: 3 TAB at 21:04

## 2023-11-29 RX ADMIN — LITHIUM CARBONATE 1200 MG: 450 TABLET, EXTENDED RELEASE ORAL at 21:04

## 2023-11-29 RX ADMIN — CHOLECALCIFEROL TAB 25 MCG (1000 UNIT) 2000 UNITS: 25 TAB at 08:23

## 2023-11-29 RX ADMIN — OLANZAPINE 20 MG: 10 TABLET, FILM COATED ORAL at 21:04

## 2023-11-29 RX ADMIN — CYANOCOBALAMIN TAB 1000 MCG 1000 MCG: 1000 TAB at 08:23

## 2023-11-29 RX ADMIN — BENZTROPINE MESYLATE 0.5 MG: 0.5 TABLET ORAL at 17:32

## 2023-11-29 RX ADMIN — NICOTINE POLACRILEX 4 MG: 4 GUM, CHEWING ORAL at 17:54

## 2023-11-29 NOTE — NURSING NOTE
Patient remains isolative and withdrawn. Patient denied SI/AVH. Patient is compliant with scheduled medications. Patient makes needs known and did not express any unmet needs. Staff maintained continuous rounding for safety and support.

## 2023-11-29 NOTE — PROGRESS NOTES
"Progress Note - Behavioral Health     Alberto Berumen 28 y.o. male MRN: [id number]   Unit/Bed#: [facility] 383-02 Encounter: [id number]    Documentation, nursing notes, medication reconciliation, labs, and vitals reviewed. Patient was seen for continuing care and reviewed with treatment team. No acute events over the past 24 hours.  Per nursing report, he continues to be more visible on the unit, out and socializing at breakfast today.  Continues to report auditory hallucinations of a derogatory nature.  He expresses frustration that his hallucinations will remit briefly with hospitalization, but will return in spite of his continued compliance with medications.    No medication changes over the past 24 hours. Continues to tolerate current medications with no adverse effects.     On evaluation today, he is again seen in his room and in bed.  He does report minimal improvement in hallucinations, but states \"they are still there \".  Staff does note some increase in socialization and less preoccupied.    Psychiatric ROS:  Behavior over the last 24 hours: unchanged  Sleep: slept off and on  Appetite: fair  Medication side effects: No   ROS: no complaints, all other systems are negative    Mental Status Evaluation:    Appearance:  age appropriate   Behavior:  pleasant, cooperative   Speech:  normal rate, normal volume   Mood:  depressed   Affect:  blunted   Thought Process:  decreased rate of thoughts   Associations: concrete associations   Thought Content:  [employer] preoccupation   Perceptual Disturbances: auditory hallucinations still present, but less frequent   Risk Potential: Suicidal ideation - None  Homicidal ideation - None  Potential for aggression - No   Sensorium:  oriented to person, place, and time/date   Memory:  recent and remote memory grossly intact   Consciousness:  alert and awake   Attention/Concentration: decreased concentration and decreased attention span   Insight:  limited   Judgment: limited "   Gait/Station: normal gait/station, normal balance   Motor Activity: no abnormal movements     Vital signs in last 24 hours:    Temp:  [97.4 °F (36.3 °C)] 97.4 °F (36.3 °C)  HR:  [92] 92  Resp:  [18] 18  BP: (111)/(69) 111/69    Laboratory results: I have personally reviewed all pertinent laboratory/tests results    Results from the past 24 hours: No results found for this or any previous visit (from the past 24 hour(s)).    Suicide/Homicide Risk Assessment:    Risk of Harm to Self:   Current Specific Risk Factors include: mental illness diagnosis, current psychotic symptoms  Protective Factors: no current suicidal ideation, ability to communicate with staff on the unit, able to contract for safety on the unit, taking medications as ordered on the unit  Based on today's assessment, Alberto presents the following risk of harm to self: minimal    Risk of Harm to Others:  Current Specific Risk Factors include: current psychotic symptoms  Protective Factors: no current homicidal ideation, able to communicate with staff on the unit, compliant with medications on the unit as ordered  Based on today's assessment, Alberto presents the following risk of harm to others: minimal    The following interventions are recommended: behavioral checks every 7 minutes, continued hospitalization on locked unit    Progress Toward Goals: limited improvement, attends groups more often, poor motivation    Assessment/Plan   Principal Problem:    Schizoaffective disorder, bipolar type (HCC)  Active Problems:    GERD (gastroesophageal reflux disease)    Medical clearance for psychiatric admission    Tobacco abuse    T wave inversion in EKG    Chronic idiopathic constipation    Vitamin B 12 deficiency    Vitamin D deficiency      Recommended Treatment:     Planned medication and treatment changes:    All current active medications have been reviewed  Encourage group therapy, milieu therapy and occupational therapy  Behavioral Health checks  every 7 minutes  Check Lithium level tomorrow  Continue current medications:    Current Facility-Administered Medications   Medication Dose Route Frequency Provider Last Rate    acetaminophen  650 mg Oral Q6H PRN Yasmin Harvey MD      acetaminophen  650 mg Oral Q4H PRN Yasmin Harvey MD      acetaminophen  975 mg Oral Q6H PRN Yasmin Harvey MD      aluminum-magnesium hydroxide-simethicone  30 mL Oral Q4H PRN Yasmin Harvey MD      haloperidol lactate  2.5 mg Intramuscular Q4H PRN Max 4/day Yasmin Harvey MD      And    LORazepam  1 mg Intramuscular Q4H PRN Max 4/day Yasmin Harvey MD      And    benztropine  0.5 mg Intramuscular Q4H PRN Max 4/day Yasmin Harvey MD      haloperidol lactate  5 mg Intramuscular Q4H PRN Max 4/day Yasmin Harvey MD      And    LORazepam  2 mg Intramuscular Q4H PRN Max 4/day Yasmin Harvey MD      And    benztropine  1 mg Intramuscular Q4H PRN Max 4/day Yasmin Harvey MD      benztropine  0.5 mg Oral BID La Flores MD      benztropine  1 mg Oral Q4H PRN Max 6/day Yasmin Harvey MD      bisacodyl  10 mg Rectal Daily PRN Yasmin Harvey MD      cholecalciferol  2,000 Units Oral Daily HOLLI Monge      cyanocobalamin  1,000 mcg Oral Daily HOLLI Monge      hydrOXYzine HCL  50 mg Oral Q6H PRN Max 4/day Yasmin Harvey MD      Or    diphenhydrAMINE  50 mg Intramuscular Q6H PRN Yasmin Harvey MD      escitalopram  15 mg Oral Daily La Flores MD      haloperidol  1 mg Oral Q6H PRN Yasmin Harvey MD      haloperidol  10 mg Oral Daily La Flores MD      haloperidol  15 mg Oral QPM La Flores MD      haloperidol  2.5 mg Oral Q4H PRN Max 4/day Yasmin Harvey MD      haloperidol  5 mg Oral Q4H PRN Max 4/day Yasmin Harvey MD      hydrOXYzine HCL  100 mg Oral Q6H PRN Max 4/day Yasmin Harvey MD      Or    LORazepam  2 mg Intramuscular Q6H PRN Yasmin Harvey MD      hydrOXYzine HCL   25 mg Oral Q6H PRN Max 4/day Yasmin Harvey MD      lithium carbonate  1,200 mg Oral HS HOLLI Anderson      melatonin  3 mg Oral HS Yasmin Harvey MD      mirtazapine  45 mg Oral HS La Flores MD      nicotine polacrilex  4 mg Oral Q2H PRN Yasmin Harvey MD      OLANZapine  20 mg Oral HS La Flores MD      polyethylene glycol  17 g Oral Daily PRN Yasmin Harvey MD      polyethylene glycol  17 g Oral Daily HOLLI Monge      senna-docusate sodium  1 tablet Oral Daily PRN Yasmin Harvey MD      traZODone  50 mg Oral HS PRN Yasmin Harvey MD       Risks / Benefits of Treatment:    Risks, benefits, and possible side effects of medications explained to patient and patient verbalizes understanding and agreement for treatment.  The patient has a history of at least 3 antipsychotic medication trials and at this time requires treatment with 2 antipsychotic agents (Zyprexa and Haldol) due to failed multiple trials of monotherapy.    Counseling / Coordination of Care:    Patient's progress discussed with staff in treatment team meeting.  Medications, treatment progress and treatment plan reviewed with patient.    HOLLI Anderson 11/29/23

## 2023-11-29 NOTE — NURSING NOTE
Patient more visible today than yesterday. Out for breakfast socializing with selective peers. Denies SI/HI/VH. Med and meal compliant. Patient offers no complaints at this time.

## 2023-11-29 NOTE — TREATMENT TEAM
11/29/23 1449   Team Meeting   Meeting Type Daily Rounds   Team Members Present   Team Members Present Physician;Nurse;   Physician Team Member Yue   Nursing Team Member Earl   Care Management Team Member Miguelito Camarillo   Patient/Family Present   Patient Present No   Patient's Family Present No         Patient currently holds 201 status.Patient is medication and meal compliant. Patient denies VH/HI/SI but confirms AH. Patient will continue on Cogention 0.5 mg BID, [id number] mg daily, Haldol 10mg daily, Haldol 15 mg every evening, lithium 1200 mg HS, Melatonin 3 mg HS, Remeron 45 mg HS, and Zyprexa 20 mg HS. Patient discharge plan and date to be determined.

## 2023-11-30 LAB
ANION GAP SERPL CALCULATED.3IONS-SCNC: 10 MMOL/L
BUN SERPL-MCNC: 11 MG/DL (ref 5–25)
CALCIUM SERPL-MCNC: 10.1 MG/DL (ref 8.4–10.2)
CHLORIDE SERPL-SCNC: 99 MMOL/L (ref 96–108)
CO2 SERPL-SCNC: 27 MMOL/L (ref 21–32)
CREAT SERPL-MCNC: 0.91 MG/DL (ref 0.6–1.3)
GFR SERPL CREATININE-BSD FRML MDRD: 115 ML/MIN/1.73SQ M
GLUCOSE SERPL-MCNC: 143 MG/DL (ref 65–140)
LITHIUM SERPL-SCNC: 0.6 MMOL/L (ref 0.6–1.2)
POTASSIUM SERPL-SCNC: 4.2 MMOL/L (ref 3.5–5.3)
SODIUM SERPL-SCNC: 136 MMOL/L (ref 135–147)

## 2023-11-30 PROCEDURE — 99232 SBSQ HOSP IP/OBS MODERATE 35: CPT | Performed by: NURSE PRACTITIONER

## 2023-11-30 PROCEDURE — 80048 BASIC METABOLIC PNL TOTAL CA: CPT | Performed by: NURSE PRACTITIONER

## 2023-11-30 PROCEDURE — 80178 ASSAY OF LITHIUM: CPT | Performed by: NURSE PRACTITIONER

## 2023-11-30 RX ADMIN — CHOLECALCIFEROL TAB 25 MCG (1000 UNIT) 2000 UNITS: 25 TAB at 08:21

## 2023-11-30 RX ADMIN — BENZTROPINE MESYLATE 0.5 MG: 0.5 TABLET ORAL at 18:30

## 2023-11-30 RX ADMIN — BENZTROPINE MESYLATE 0.5 MG: 0.5 TABLET ORAL at 08:20

## 2023-11-30 RX ADMIN — MIRTAZAPINE 45 MG: 30 TABLET, FILM COATED ORAL at 21:18

## 2023-11-30 RX ADMIN — HALOPERIDOL 10 MG: 10 TABLET ORAL at 08:21

## 2023-11-30 RX ADMIN — NICOTINE POLACRILEX 4 MG: 4 GUM, CHEWING ORAL at 18:29

## 2023-11-30 RX ADMIN — CYANOCOBALAMIN TAB 1000 MCG 1000 MCG: 1000 TAB at 08:21

## 2023-11-30 RX ADMIN — LITHIUM CARBONATE 1200 MG: 450 TABLET, EXTENDED RELEASE ORAL at 21:17

## 2023-11-30 RX ADMIN — NICOTINE POLACRILEX 4 MG: 4 GUM, CHEWING ORAL at 14:36

## 2023-11-30 RX ADMIN — OLANZAPINE 20 MG: 10 TABLET, FILM COATED ORAL at 21:18

## 2023-11-30 RX ADMIN — HALOPERIDOL 15 MG: 5 TABLET ORAL at 18:29

## 2023-11-30 RX ADMIN — MELATONIN TAB 3 MG 3 MG: 3 TAB at 21:17

## 2023-11-30 RX ADMIN — ESCITSLOPRAM 15 MG: 5 TABLET ORAL at 08:21

## 2023-11-30 RX ADMIN — POLYETHYLENE GLYCOL 3350 17 G: 17 POWDER, FOR SOLUTION ORAL at 08:20

## 2023-11-30 NOTE — PROGRESS NOTES
"Progress Note - Behavioral Health     Alberto Berumen 28 y.o. male MRN: [id number]   Unit/Bed#: [facility] 383-02 Encounter: [id number]    Documentation, nursing notes, medication reconciliation, labs, and vitals reviewed. Patient was seen for continuing care and reviewed with treatment team. No acute events over the past 24 hours.  Per nursing report, staff continues to report mild improvements.  He is out of his room a little bit more.  Continues to report auditory hallucinations, but did tell staff \"not as bothersome \".    No medication changes over the past 24 hours. Continues to tolerate current medications with no adverse effects.     On evaluation today, is again seen in his room in bed.  However, states he was up for breakfast for the past 2 days-this is an improvement.  Continues to endorse auditory hallucinations.  They continue to be derogatory in nature, telling him \"he will go to hell \".  But does state they are gradually decreasing in frequency and intensity.  Mood is still predominantly depressed.  Motivation only slightly improving.  We are awaiting lithium level to be done today.    No suicidal ideation, plan, or intent, and does not exhibit any symptoms of aimee on evaluation.    Psychiatric ROS:  Behavior over the last 24 hours: minimal improvement  Sleep: hypersomnia  Appetite: improved  Medication side effects: No   ROS: no complaints, all other systems are negative    Mental Status Evaluation:    Appearance:  marginal hygiene, dressed in hospital attire   Behavior:  guarded   Speech:  slow, scant, soft   Mood:  depressed   Affect:  blunted   Thought Process:  decreased rate of thoughts   Associations: concrete associations   Thought Content:  Worship preoccupation   Perceptual Disturbances: auditory hallucinations still present, but less frequent   Risk Potential: Suicidal ideation - None  Homicidal ideation - None  Potential for aggression - No   Sensorium:  oriented to person, place, and time/date "   Memory:  recent and remote memory grossly intact   Consciousness:  alert and awake   Attention/Concentration: attention span and concentration appear shorter than expected for age   Insight:  limited   Judgment: limited   Gait/Station: normal gait/station, normal balance   Motor Activity: no abnormal movements     Vital signs in last 24 hours:    Temp:  [97.3 °F (36.3 °C)] 97.3 °F (36.3 °C)  HR:  [75-99] 75  Resp:  [16-18] 18  BP: (106-142)/(56-79) 106/56    Laboratory results: I have personally reviewed all pertinent laboratory/tests results    Results from the past 24 hours: No results found for this or any previous visit (from the past 24 hour(s)).    Suicide/Homicide Risk Assessment:    Risk of Harm to Self:   Current Specific Risk Factors include: mental illness diagnosis, current psychotic symptoms  Protective Factors: no current suicidal ideation, ability to communicate with staff on the unit, able to contract for safety on the unit, taking medications as ordered on the unit  Based on today's assessment, Alberto presents the following risk of harm to self: minimal    Risk of Harm to Others:  Current Specific Risk Factors include: current psychotic symptoms  Protective Factors: no current homicidal ideation, able to communicate with staff on the unit, compliant with medications on the unit as ordered, follows staff redirection  Based on today's assessment, Alberto presents the following risk of harm to others: minimal    The following interventions are recommended: behavioral checks every 7 minutes, continued hospitalization on locked unit    Progress Toward Goals: minimal improvement    Assessment/Plan   Principal Problem:    Schizoaffective disorder, bipolar type ([facility])  Active Problems:    GERD (gastroesophageal reflux disease)    Medical clearance for psychiatric admission    Tobacco abuse    T wave inversion in EKG    Chronic idiopathic constipation    Vitamin B 12 deficiency    Vitamin D  deficiency      Recommended Treatment:     Planned medication and treatment changes:    All current active medications have been reviewed  Encourage group therapy, milieu therapy and occupational therapy  Behavioral Health checks every 7 minutes  Check Lithium level today  Continue all other medications:    Current Facility-Administered Medications   Medication Dose Route Frequency Provider Last Rate    acetaminophen  650 mg Oral Q6H PRN Yasmin Harvey MD      acetaminophen  650 mg Oral Q4H PRN Yasmin Harvey MD      acetaminophen  975 mg Oral Q6H PRN Yasmin Harvey MD      aluminum-magnesium hydroxide-simethicone  30 mL Oral Q4H PRN Yasmin Harvey MD      haloperidol lactate  2.5 mg Intramuscular Q4H PRN Max 4/day Yasmin Harvey MD      And    LORazepam  1 mg Intramuscular Q4H PRN Max 4/day Yasmin Harvey MD      And    benztropine  0.5 mg Intramuscular Q4H PRN Max 4/day Yasmin Harvey MD      haloperidol lactate  5 mg Intramuscular Q4H PRN Max 4/day Yasmin Harvey MD      And    LORazepam  2 mg Intramuscular Q4H PRN Max 4/day Yasmin Harvey MD      And    benztropine  1 mg Intramuscular Q4H PRN Max 4/day Yasmin Harvey MD      benztropine  0.5 mg Oral BID La Flores MD      benztropine  1 mg Oral Q4H PRN Max 6/day Yasmin Harvey MD      bisacodyl  10 mg Rectal Daily PRN Yasmin Harvey MD      cholecalciferol  2,000 Units Oral Daily HOLLI Monge      cyanocobalamin  1,000 mcg Oral Daily HOLLI Monge      hydrOXYzine HCL  50 mg Oral Q6H PRN Max 4/day Yasmin Harvey MD      Or    diphenhydrAMINE  50 mg Intramuscular Q6H PRN Yasmin Harvey MD      escitalopram  15 mg Oral Daily La Flores MD      haloperidol  1 mg Oral Q6H PRN Yasmin Harvey MD      haloperidol  10 mg Oral Daily La Flores MD      haloperidol  15 mg Oral QPM La Flores MD      haloperidol  2.5 mg Oral Q4H PRN Max 4/day Yasmin Harvey MD       haloperidol  5 mg Oral Q4H PRN Max 4/day Yasmin Harvey MD      hydrOXYzine HCL  100 mg Oral Q6H PRN Max 4/day Yasmin Harvey MD      Or    LORazepam  2 mg Intramuscular Q6H PRN Yasmin Harvey MD      hydrOXYzine HCL  25 mg Oral Q6H PRN Max 4/day Yasmin Harvey MD      lithium carbonate  1,200 mg Oral HS HOLLI Anderson      melatonin  3 mg Oral HS Yasmin Harvey MD      mirtazapine  45 mg Oral HS La Flores MD      nicotine polacrilex  4 mg Oral Q2H PRN Yasmin Harvey MD      OLANZapine  20 mg Oral HS La Flores MD      polyethylene glycol  17 g Oral Daily PRN Yasmin Harvey MD      polyethylene glycol  17 g Oral Daily HOLLI Monge      senna-docusate sodium  1 tablet Oral Daily PRN Yasmin Harvey MD      traZODone  50 mg Oral HS PRN Yasmin Harvey MD       Risks / Benefits of Treatment:    Risks, benefits, and possible side effects of medications explained to patient and patient verbalizes understanding and agreement for treatment.  The patient has a history of at least 3 antipsychotic medication trials and at this time requires treatment with 2 antipsychotic agents (Zyprexa and Haldol) due to failed multiple trials of monotherapy.    Counseling / Coordination of Care:    Patient's progress discussed with staff in treatment team meeting.  Medications, treatment progress and treatment plan reviewed with patient.    HOLLI Anderson 11/30/23

## 2023-11-30 NOTE — NURSING NOTE
"Pt compliant with med and meals,  Pt denies VH, SI, and HI.  Pt endorses AH of voices telling him he's \"going to die\",  Pt coping well with prayer.  Pt isolated to bed.    "

## 2023-11-30 NOTE — PROGRESS NOTES
11/30/23 1552   Team Meeting   Meeting Type Daily Rounds   Team Members Present   Team Members Present Physician;Nurse;   Physician Team Member Thuan   Nursing Team Member Giancarlo   Care Management Team Member Miguelito / Rabia   Patient/Family Present   Patient Present No   Patient's Family Present No     Patient currently holds 201 status. Patient is medication and meal compliant. Patient denies VH/HI/SI but confirms AH. Patient will continue on Cogention 0.5 mg BID, [id number] mg daily, Haldol 15 mg every evening, lithium 1200 mg HS, Melatonin 3 mg HS, Remeron 45 mg HS, and Zyprexa 20 mg HS. Second dose of Haldol to be increased to 15 mg. Patient discharge plan and date to be determined.

## 2023-11-30 NOTE — NURSING NOTE
Patient is in the community, calm and cooperative. Patient endorses AH, but not bothersome. Patient denies SI/HI. Patient is compliant with scheduled medications. Staff maintained continuous rounding for safety and support.

## 2023-12-01 PROCEDURE — 99232 SBSQ HOSP IP/OBS MODERATE 35: CPT | Performed by: STUDENT IN AN ORGANIZED HEALTH CARE EDUCATION/TRAINING PROGRAM

## 2023-12-01 RX ADMIN — MIRTAZAPINE 45 MG: 30 TABLET, FILM COATED ORAL at 21:37

## 2023-12-01 RX ADMIN — ESCITSLOPRAM 15 MG: 5 TABLET ORAL at 08:15

## 2023-12-01 RX ADMIN — CYANOCOBALAMIN TAB 1000 MCG 1000 MCG: 1000 TAB at 08:15

## 2023-12-01 RX ADMIN — CHOLECALCIFEROL TAB 25 MCG (1000 UNIT) 2000 UNITS: 25 TAB at 08:15

## 2023-12-01 RX ADMIN — LITHIUM CARBONATE 1200 MG: 450 TABLET, EXTENDED RELEASE ORAL at 21:37

## 2023-12-01 RX ADMIN — HALOPERIDOL 10 MG: 10 TABLET ORAL at 08:15

## 2023-12-01 RX ADMIN — BENZTROPINE MESYLATE 0.5 MG: 0.5 TABLET ORAL at 08:15

## 2023-12-01 RX ADMIN — POLYETHYLENE GLYCOL 3350 17 G: 17 POWDER, FOR SOLUTION ORAL at 08:15

## 2023-12-01 RX ADMIN — MELATONIN TAB 3 MG 3 MG: 3 TAB at 21:37

## 2023-12-01 RX ADMIN — HALOPERIDOL 15 MG: 5 TABLET ORAL at 17:12

## 2023-12-01 RX ADMIN — BENZTROPINE MESYLATE 0.5 MG: 0.5 TABLET ORAL at 17:12

## 2023-12-01 RX ADMIN — OLANZAPINE 20 MG: 10 TABLET, FILM COATED ORAL at 21:37

## 2023-12-01 RX ADMIN — NICOTINE POLACRILEX 4 MG: 4 GUM, CHEWING ORAL at 14:56

## 2023-12-01 NOTE — PLAN OF CARE
Problem: PSYCHOSIS  Goal: Will report no hallucinations or delusions  Description: Interventions:  - Administer medication as  ordered  - Every waking shifts and PRN assess for the presence of hallucinations and or delusions  - Assist with reality testing to support increasing orientation  - Assess if patient's hallucinations or delusions are encouraging self-harm or harm to others and intervene as appropriate  Outcome: Progressing     Problem: ANXIETY  Goal: Will report anxiety at manageable levels  Description: INTERVENTIONS:  - Administer medication as ordered  - Teach and encourage coping skills  - Provide emotional support  - Assess patient/family for anxiety and ability to cope  Outcome: Progressing     Problem: Ineffective Coping  Goal: Participates in unit activities  Description: Interventions:  - Provide therapeutic environment   - Provide required programming   - Redirect inappropriate behaviors   Outcome: Progressing     Problem: Depression  Goal: Verbalize thoughts and feelings  Description: Interventions:  - Assess and re-assess patient's level of risk   - Engage patient in 1:1 interactions, daily, for a minimum of 15 minutes   - Encourage patient to express feelings, fears, frustrations, hopes   Outcome: Progressing  Goal: Refrain from isolation  Description: Interventions:  - Develop a trusting relationship   - Encourage socialization   Outcome: Progressing  Goal: Refrain from self-neglect  Outcome: Progressing

## 2023-12-01 NOTE — CASE MANAGEMENT
Writer checked in with patient. Patient reported that he continues to have auditory hallucinations. Patient reported frequency to be the same but disclosed volume is a bit lower. Patient shared he has no unmet needs at this time and offered no questions.

## 2023-12-01 NOTE — NURSING NOTE
"Pt pleasant and cooperative, Pt isolated in room in bed most of shift , Pt denied all pain anxiety and depression, but pt did state \"I'm having some bad thoughts, thoughts I don't want to be here that much\" \"I don't have a plan or anything its just the thoughts that I always have\" Pt contracted for safety and says he feels good otherwise, Pt denies HI and SI , Pt denied AH and VH, Pt took all scheduled HS meds, Q7 min safety checks maintained  "

## 2023-12-01 NOTE — PROGRESS NOTES
Progress Note - Behavioral Health     Alberto Berumen 28 y.o. male MRN: [id number]   Unit/Bed#: [facility] 383-02 Encounter: [id number]    Documentation, nursing notes, medication reconciliation, labs, and vitals reviewed. Patient was seen for continuing care and reviewed with treatment team. No acute events over the past 24 hours.  Per nursing report, he continues isolative to his room, often in bed.  Poorly motivated.  Continues to endorse auditory hallucinations making derogatory comments minimal participation in the therapeutic milieu.    No medication changes over the past 24 hours. Continues to tolerate current medications with no adverse effects.     On evaluation today, he is again seen in his room and resting in bed.  Continues to report only minimal improvement in auditory hallucinations.  They continue to make derogatory comments or tell him he will be going to hell..  No suicidal ideation, plan, or intent,  does not exhibit any symptoms of aimee on evaluation.    Psychiatric ROS:  Behavior over the last 24 hours: minimal improvement  Sleep: hypersomnia  Appetite: fair  Medication side effects: No   ROS: no complaints, all other systems are negative    Mental Status Evaluation:    Appearance:  marginal hygiene, dressed in hospital attire   Behavior:  guarded   Speech:  scant   Mood:  depressed   Affect:  flat   Thought Process:  decreased rate of thoughts   Associations: concrete associations   Thought Content:  some paranoia   Perceptual Disturbances: auditory hallucinations   Risk Potential: Suicidal ideation - None  Homicidal ideation - None  Potential for aggression - No   Sensorium:  oriented to person, place, and time/date   Memory:  recent and remote memory grossly intact   Consciousness:  alert and awake   Attention/Concentration: decreased concentration and decreased attention span   Insight:  poor   Judgment: poor   Gait/Station: normal gait/station, normal balance   Motor Activity: no abnormal  movements     Vital signs in last 24 hours:    Temp:  [97.7 °F (36.5 °C)] 97.7 °F (36.5 °C)  HR:  [105] 105  Resp:  [18] 18  BP: (139)/(84) 139/84    Laboratory results: I have personally reviewed all pertinent laboratory/tests results    Results from the past 24 hours:   Recent Results (from the past 24 hour(s))   Lithium level    Collection Time: 11/30/23  8:36 PM   Result Value Ref Range    Lithium Lvl 0.6 0.6 - 1.2 mmol/L   Basic metabolic panel    Collection Time: 11/30/23  8:36 PM   Result Value Ref Range    Sodium 136 135 - 147 mmol/L    Potassium 4.2 3.5 - 5.3 mmol/L    Chloride 99 96 - 108 mmol/L    CO2 27 21 - 32 mmol/L    ANION GAP 10 mmol/L    BUN 11 5 - 25 mg/dL    Creatinine 0.91 0.60 - 1.30 mg/dL    Glucose 143 (H) 65 - 140 mg/dL    Calcium 10.1 8.4 - 10.2 mg/dL    eGFR 115 ml/min/1.73sq m       Suicide/Homicide Risk Assessment:    Risk of Harm to Self:   Current Specific Risk Factors include: current depressive symptoms, current psychotic symptoms  Protective Factors: no current suicidal ideation, ability to communicate with staff on the unit, able to contract for safety on the unit, taking medications as ordered on the unit  Based on today's assessment, Alberto presents the following risk of harm to self: minimal    Risk of Harm to Others:  Current Specific Risk Factors include: current psychotic symptoms  Protective Factors: no current homicidal ideation, able to communicate with staff on the unit, compliant with medications on the unit as ordered  Based on today's assessment, Alberto presents the following risk of harm to others: minimal    The following interventions are recommended: behavioral checks every 7 minutes, continued hospitalization on locked unit    Progress Toward Goals: no significant improvement    Assessment/Plan   Principal Problem:    Schizoaffective disorder, bipolar type ([facility])  Active Problems:    GERD (gastroesophageal reflux disease)    Medical clearance for psychiatric  admission    Tobacco abuse    T wave inversion in EKG    Chronic idiopathic constipation    Vitamin B 12 deficiency    Vitamin D deficiency      Recommended Treatment:     Planned medication and treatment changes:    All current active medications have been reviewed  Encourage group therapy, milieu therapy and occupational therapy  Behavioral Health checks every 7 minutes  Increase Will increase Haldol to 50 mg in the morning and 15 mg at bedtime to address ongoing hallucinations  Continue all other medications:  Most recent lithium level continues at 0.6 in spite of increase of lithium earlier this week.  We will consider increasing lithium as needed to augment antipsychotic    Current Facility-Administered Medications   Medication Dose Route Frequency Provider Last Rate    acetaminophen  650 mg Oral Q6H PRN Yasmin Harvey MD      acetaminophen  650 mg Oral Q4H PRN Yasmin Harvey MD      acetaminophen  975 mg Oral Q6H PRN Yasmin Harvey MD      aluminum-magnesium hydroxide-simethicone  30 mL Oral Q4H PRN Yasmin Harvey MD      haloperidol lactate  2.5 mg Intramuscular Q4H PRN Max 4/day Yasmin Harvey MD      And    LORazepam  1 mg Intramuscular Q4H PRN Max 4/day Yasmin Harvey MD      And    benztropine  0.5 mg Intramuscular Q4H PRN Max 4/day Yasmin Harvey MD      haloperidol lactate  5 mg Intramuscular Q4H PRN Max 4/day Yasmin Harvey MD      And    LORazepam  2 mg Intramuscular Q4H PRN Max 4/day Yasmin Harvey MD      And    benztropine  1 mg Intramuscular Q4H PRN Max 4/day Yasmin Harvey MD      benztropine  0.5 mg Oral BID La Flores MD      benztropine  1 mg Oral Q4H PRN Max 6/day Yasmin Harvey MD      bisacodyl  10 mg Rectal Daily PRN Yasmin Harvey MD      cholecalciferol  2,000 Units Oral Daily HOLLI Monge      cyanocobalamin  1,000 mcg Oral Daily HOLLI Monge      hydrOXYzine HCL  50 mg Oral Q6H PRN Max 4/day Yasmin Harvey  MD      Or    diphenhydrAMINE  50 mg Intramuscular Q6H PRN Yasmin Harvey MD      escitalopram  15 mg Oral Daily La Flores MD      haloperidol  1 mg Oral Q6H PRN Yasmin Harvey MD      haloperidol  15 mg Oral QPM La Flores MD      [START ON 12/2/2023] haloperidol  15 mg Oral Daily HOLLI Anderson      haloperidol  2.5 mg Oral Q4H PRN Max 4/day Yasmin Harvey MD      haloperidol  5 mg Oral Q4H PRN Max 4/day Yasmin Harvey MD      hydrOXYzine HCL  100 mg Oral Q6H PRN Max 4/day Yasmin Harvey MD      Or    LORazepam  2 mg Intramuscular Q6H PRN Yasmin Harvey MD      hydrOXYzine HCL  25 mg Oral Q6H PRN Max 4/day Yasmin Harvey MD      lithium carbonate  1,200 mg Oral HS HOLLI Anderson      melatonin  3 mg Oral HS Yasmin Harvey MD      mirtazapine  45 mg Oral HS La Flores MD      nicotine polacrilex  4 mg Oral Q2H PRN Yasmin Harvey MD      OLANZapine  20 mg Oral HS La Flores MD      polyethylene glycol  17 g Oral Daily PRN Yasmin Harvey MD      polyethylene glycol  17 g Oral Daily HOLLI Monge      senna-docusate sodium  1 tablet Oral Daily PRN Yasmin Harvey MD      traZODone  50 mg Oral HS PRN Yasmin Harvey MD       Risks / Benefits of Treatment:    Risks, benefits, and possible side effects of medications explained to patient and patient verbalizes understanding and agreement for treatment.    Counseling / Coordination of Care:    Patient's progress discussed with staff in treatment team meeting.  Medications, treatment progress and treatment plan reviewed with patient.    HOLLI Anderson 12/01/23

## 2023-12-01 NOTE — NURSING NOTE
Pt denies SI/HI/AH/VH.  Present in dayroom and milieu but only  briefly. Pt is mostly isolative to his room and self. Pt is more visible in the evening per staff. . Medication and meal compliant. Pt is calm and pleasant.  Scant/guarded with communication. No further concerns as of present. Plan of care ongoing.

## 2023-12-01 NOTE — CASE MANAGEMENT
Wanda patients primary case manage called and emailed over ALEXIA for patient. Wanda requested for update on patient and current medications. Wanda requested for patients treatment plan. Writer had patient sign ALEXIA for Wanda as well and faxed over Tx plan.

## 2023-12-02 PROCEDURE — 99232 SBSQ HOSP IP/OBS MODERATE 35: CPT | Performed by: PSYCHIATRY & NEUROLOGY

## 2023-12-02 RX ADMIN — MELATONIN TAB 3 MG 3 MG: 3 TAB at 21:18

## 2023-12-02 RX ADMIN — LITHIUM CARBONATE 1200 MG: 450 TABLET, EXTENDED RELEASE ORAL at 21:18

## 2023-12-02 RX ADMIN — HALOPERIDOL 15 MG: 5 TABLET ORAL at 17:55

## 2023-12-02 RX ADMIN — POLYETHYLENE GLYCOL 3350 17 G: 17 POWDER, FOR SOLUTION ORAL at 09:26

## 2023-12-02 RX ADMIN — OLANZAPINE 20 MG: 10 TABLET, FILM COATED ORAL at 21:18

## 2023-12-02 RX ADMIN — ESCITSLOPRAM 15 MG: 5 TABLET ORAL at 09:25

## 2023-12-02 RX ADMIN — HALOPERIDOL 15 MG: 5 TABLET ORAL at 09:25

## 2023-12-02 RX ADMIN — CYANOCOBALAMIN TAB 1000 MCG 1000 MCG: 1000 TAB at 09:25

## 2023-12-02 RX ADMIN — BENZTROPINE MESYLATE 0.5 MG: 0.5 TABLET ORAL at 17:55

## 2023-12-02 RX ADMIN — BENZTROPINE MESYLATE 0.5 MG: 0.5 TABLET ORAL at 09:26

## 2023-12-02 RX ADMIN — MIRTAZAPINE 45 MG: 30 TABLET, FILM COATED ORAL at 21:18

## 2023-12-02 RX ADMIN — CHOLECALCIFEROL TAB 25 MCG (1000 UNIT) 2000 UNITS: 25 TAB at 09:25

## 2023-12-02 NOTE — PROGRESS NOTES
Psychiatric Progress Note - Department of Behavioral Health   Alberto Berumen 28 y.o. male MRN: [id number]  Unit/Bed#: [facility] 383-02 Encounter: [id number]    ASSESSMENT & PLAN     Principal Problem:    Schizoaffective disorder, bipolar type (HCC)  Active Problems:    GERD (gastroesophageal reflux disease)    Medical clearance for psychiatric admission    Tobacco abuse    T wave inversion in EKG    Chronic idiopathic constipation    Vitamin B 12 deficiency    Vitamin D deficiency      Continue to promote patient participation in therapeutic milieu.  Continue medical management per medicine.  Continue previously prescribed psychotropic medication regimen; see below.  Continue behavioral health checks q.7 minutes.   Continue vitals per behavioral health unit protocol.  Discharge date per primary team; 201 commitment status.    SUBJECTIVE     Patient evaluated this a.m. for continuity of care. Patient was discussed at length with nursing and treatment team. Per nursing, patient remains calm, cooperative, although isolative at times in the milieu, adherent to his medications less any acute adverse effects. No acute distress is noted throughout evaluation. Alberto Berumen denies suicidal/homicidal ideation; contracts for safety. Patient appears scant, sparse, minimally interactive involving this writer, superficial, suspicious, denying any/all psychiatric complaints/concerns, although he appears internally preoccupied and distracted throughout evaluation.     PSYCHIATRIC REVIEW OF SYSTEMS     Behavior over the last 24 hours:  unchanged  Sleep: adequate  Appetite: adequate  Medication side effects: No    REVIEW OF SYSTEMS   Review of systems: no complaints    OBJECTIVE     Vital Signs in Past 24 Hours:  Temp:  [97.8 °F (36.6 °C)-97.9 °F (36.6 °C)] 97.9 °F (36.6 °C)  HR:  [] 79  Resp:  [18] 18  BP: (104-152)/(59-84) 104/59    Intake/Output in Past 24 hours:  No intake/output data recorded.  No intake/output data  recorded.        Laboratory Results:  I have personally reviewed all pertinent laboratory/tests results.  Most Recent Labs:   Lab Results   Component Value Date    WBC 10.15 11/15/2023    RBC 5.32 11/15/2023    HGB 12.8 11/15/2023    HCT 42.4 11/15/2023     11/15/2023    RDW 14.0 11/15/2023    NEUTROABS 6.25 11/15/2023    SODIUM 136 11/30/2023    K 4.2 11/30/2023    CL 99 11/30/2023    CO2 27 11/30/2023    BUN 11 11/30/2023    CREATININE 0.91 11/30/2023    GLUC 143 (H) 11/30/2023    GLUF 97 11/15/2023    CALCIUM 10.1 11/30/2023    AST 22 11/15/2023    ALT 40 11/15/2023    ALKPHOS 69 11/15/2023    TP 6.5 11/15/2023    ALB 4.1 11/15/2023    TBILI 0.45 11/15/2023    CHOLESTEROL 169 11/15/2023    HDL 37 (L) 11/15/2023    TRIG 117 11/15/2023    LDLCALC 109 (H) 11/15/2023    NONHDLC 132 11/15/2023    LITHIUM 0.6 11/30/2023    [id number] 1.062 11/15/2023    HGBA1C 4.9 11/15/2023    EAG 94 11/15/2023     Labs in last 72 hours:   Recent Labs     11/30/23 2036   SODIUM 136   K 4.2   CL 99   CO2 27   BUN 11   CREATININE 0.91   GLUC 143*   CALCIUM 10.1   LITHIUM 0.6     Admission Labs:   Admission on 11/14/2023   Component Date Value    Clarity, UA 11/15/2023 Clear     Color, UA 11/15/2023 Agatha (A)     Specific [place], UA 11/15/2023 1.020     pH, UA 11/15/2023 6.0     Glucose, UA 11/15/2023 Negative     Ketones, UA 11/15/2023 Negative     Occult Blood, UA 11/15/2023 Negative     Protein, UA 11/15/2023 15 (Trace) (A)     Nitrite, UA 11/15/2023 Negative     Bilirubin, UA 11/15/2023 Negative     Leukocytes, UA 11/15/2023 25.0 (A)     WBC, UA 11/15/2023 2-4     RBC, UA 11/15/2023 None Seen     Bacteria, UA 11/15/2023 Occasional     Epithelial Cells 11/15/2023 Occasional     MUCUS THREADS 11/15/2023 Occasional (A)     UROBILINOGEN UA 11/15/2023 4.0 (A)     Sodium 11/15/2023 139     Potassium 11/15/2023 3.9     Chloride 11/15/2023 105     CO2 11/15/2023 26     ANION GAP 11/15/2023 8     BUN 11/15/2023 8     Creatinine  11/15/2023 0.92     Glucose 11/15/2023 97     Glucose, Fasting 11/15/2023 97     Calcium 11/15/2023 9.1     AST 11/15/2023 22     ALT 11/15/2023 40     Alkaline Phosphatase 11/15/2023 69     Total Protein 11/15/2023 6.5     Albumin 11/15/2023 4.1     Total Bilirubin 11/15/2023 0.45     eGFR 11/15/2023 113     WBC 11/15/2023 10.15     RBC 11/15/2023 5.32     Hemoglobin 11/15/2023 12.8     Hematocrit 11/15/2023 42.4     MCV 11/15/2023 80 (L)     MCH 11/15/2023 24.1 (L)     MCHC 11/15/2023 30.2 (L)     RDW 11/15/2023 14.0     MPV 11/15/2023 10.6     Platelets 11/15/2023 252     nRBC 11/15/2023 0     Neutrophils Relative 11/15/2023 62     Immat GRANS % 11/15/2023 0     Lymphocytes Relative 11/15/2023 27     Monocytes Relative 11/15/2023 8     Eosinophils Relative 11/15/2023 2     Basophils Relative 11/15/2023 1     Neutrophils Absolute 11/15/2023 6.25     Immature Grans Absolute 11/15/2023 0.03     Lymphocytes Absolute 11/15/2023 2.73     Monocytes Absolute 11/15/2023 0.85     Eosinophils Absolute 11/15/2023 0.24     Basophils Absolute 11/15/2023 0.05     TSH 3RD GENERATON 11/15/2023 1.062     Vitamin B-12 11/15/2023 219     Folate 11/15/2023 7.8     Vit D, 25-Hydroxy 11/15/2023 7.6 (L)     Cholesterol 11/15/2023 169     Triglycerides 11/15/2023 117     HDL, Direct 11/15/2023 37 (L)     LDL Calculated 11/15/2023 109 (H)     Non-HDL-Chol (CHOL-HDL) 11/15/2023 132     Hemoglobin A1C 11/15/2023 4.9     EAG 11/15/2023 94     Iron Saturation 11/15/2023 24     TIBC 11/15/2023 302     Iron 11/15/2023 71     UIBC 11/15/2023 231     Ferritin 11/15/2023 56     Ventricular Rate 11/15/2023 62     Atrial Rate 11/15/2023 62     OK Interval 11/15/2023 142     QRSD Interval 11/15/2023 92     QT Interval 11/15/2023 392     QTC Interval 11/15/2023 397     P Axis 11/15/2023 49     QRS Axis 11/15/2023 45     T Wave [place] 11/15/2023 18     Lithium Lvl 11/18/2023 0.3 (L)     Sodium 11/18/2023 139     Potassium 11/18/2023 4.3     Chloride  11/18/2023 104     CO2 11/18/2023 26     ANION GAP 11/18/2023 9     BUN 11/18/2023 10     Creatinine 11/18/2023 1.07     Glucose 11/18/2023 119     Calcium 11/18/2023 9.7     eGFR 11/18/2023 94     Syphilis Total Antibody 11/18/2023 Non-reactive     Sodium 11/24/2023 136     Potassium 11/24/2023 4.6     Chloride 11/24/2023 100     CO2 11/24/2023 26     ANION GAP 11/24/2023 10     BUN 11/24/2023 11     Creatinine 11/24/2023 1.03     Glucose 11/24/2023 140     Calcium 11/24/2023 10.1     eGFR 11/24/2023 99     L[place] Lvl 11/24/2023 0.6     Lithium Lvl 11/26/2023 0.6     Lithium Lvl 11/30/2023 0.6     Sodium 11/30/2023 136     Potassium 11/30/2023 4.2     Chloride 11/30/2023 99     CO2 11/30/2023 27     ANION GAP 11/30/2023 10     BUN 11/30/2023 11     Creatinine 11/30/2023 0.91     Glucose 11/30/2023 143 (H)     Calcium 11/30/2023 10.1     eGFR 11/30/2023 115        Behavioral Health Medications: all current active meds have been reviewed, continue current psychiatric medications, and current meds:   Current Facility-Administered Medications   Medication Dose Route Frequency    acetaminophen (TYLENOL) tablet 650 mg  650 mg Oral Q6H PRN    acetaminophen (TYLENOL) tablet 650 mg  650 mg Oral Q4H PRN    acetaminophen (TYLENOL) tablet 975 mg  975 mg Oral Q6H PRN    aluminum-magnesium hydroxide-simethicone (MAALOX) oral suspension 30 mL  30 mL Oral Q4H PRN    haloperidol lactate (HALDOL) injection 2.5 mg  2.5 mg Intramuscular Q4H PRN Max 4/day    And    LORazepam (ATIVAN) injection 1 mg  1 mg Intramuscular Q4H PRN Max 4/day    And    benztropine (COGENTIN) injection 0.5 mg  0.5 mg Intramuscular Q4H PRN Max 4/day    haloperidol lactate (HALDOL) injection 5 mg  5 mg Intramuscular Q4H PRN Max 4/day    And    LORazepam (ATIVAN) injection 2 mg  2 mg Intramuscular Q4H PRN Max 4/day    And    benztropine (COGENTIN) injection 1 mg  1 mg Intramuscular Q4H PRN Max 4/day    benztropine (COGENTIN) tablet 0.5 mg  0.5 mg Oral BID     benztropine (COGENTIN) tablet 1 mg  1 mg Oral Q4H PRN Max 6/day    bisacodyl (DULCOLAX) rectal suppository 10 mg  10 mg Rectal Daily PRN    cholecalciferol (VITAMIN D3) tablet 2,000 Units  2,000 Units Oral Daily    cyanocobalamin (VITAMIN B-12) tablet 1,000 mcg  1,000 mcg Oral Daily    hydrOXYzine HCL (ATARAX) tablet 50 mg  50 mg Oral Q6H PRN Max 4/day    Or    diphenhydrAMINE (BENADRYL) injection 50 mg  50 mg Intramuscular Q6H PRN    escitalopram (LEXAPRO) tablet 15 mg  15 mg Oral Daily    haloperidol (HALDOL) tablet 1 mg  1 mg Oral Q6H PRN    haloperidol (HALDOL) tablet 15 mg  15 mg Oral QPM    haloperidol (HALDOL) tablet 15 mg  15 mg Oral Daily    haloperidol (HALDOL) tablet 2.5 mg  2.5 mg Oral Q4H PRN Max 4/day    haloperidol (HALDOL) tablet 5 mg  5 mg Oral Q4H PRN Max 4/day    hydrOXYzine HCL (ATARAX) tablet 100 mg  100 mg Oral Q6H PRN Max 4/day    Or    LORazepam (ATIVAN) injection 2 mg  2 mg Intramuscular Q6H PRN    hydrOXYzine HCL (ATARAX) tablet 25 mg  25 mg Oral Q6H PRN Max 4/day    lithium carbonate (LITHOBID) CR tablet 1,200 mg  1,200 mg Oral HS    melatonin tablet 3 mg  3 mg Oral HS    mirtazapine (REMERON) tablet 45 mg  45 mg Oral HS    nicotine polacrilex (NICORETTE) gum 4 mg  4 mg Oral Q2H PRN    OLANZapine (ZyPREXA) tablet 20 mg  20 mg Oral HS    polyethylene glycol (MIRALAX) packet 17 g  17 g Oral Daily PRN    polyethylene glycol (MIRALAX) packet 17 g  17 g Oral Daily    senna-docusate sodium (SENOKOT S) 8.6-50 mg per tablet 1 tablet  1 tablet Oral Daily PRN    traZODone (DESYREL) tablet 50 mg  50 mg Oral HS PRN   .    Current Facility-Administered Medications   Medication Dose Route Frequency Provider Last Rate    acetaminophen  650 mg Oral Q6H PRN Yasmin Harvey MD      acetaminophen  650 mg Oral Q4H PRN Yasmin Harvey MD      acetaminophen  975 mg Oral Q6H PRN Yasmin Harvey MD      aluminum-magnesium hydroxide-simethicone  30 mL Oral Q4H PRN Yasmin Harvey MD      haloperidol  lactate  2.5 mg Intramuscular Q4H PRN Max 4/day Yasmin Harvey MD      And    LORazepam  1 mg Intramuscular Q4H PRN Max 4/day Yasmin Harvey MD      And    benztropine  0.5 mg Intramuscular Q4H PRN Max 4/day Yasmin Harvey MD      haloperidol lactate  5 mg Intramuscular Q4H PRN Max 4/day Yasmin Harvey MD      And    LORazepam  2 mg Intramuscular Q4H PRN Max 4/day Yasmin Harvey MD      And    benztropine  1 mg Intramuscular Q4H PRN Max 4/day Yasmin Harvey MD      benztropine  0.5 mg Oral BID La Flores MD      benztropine  1 mg Oral Q4H PRN Max 6/day Yasmin Harvey MD      bisacodyl  10 mg Rectal Daily PRN Yasmin Harvey MD      cholecalciferol  2,000 Units Oral Daily HOLLI Monge      cyanocobalamin  1,000 mcg Oral Daily HOLLI Monge      hydrOXYzine HCL  50 mg Oral Q6H PRN Max 4/day Yasmin Harvey MD      Or    diphenhydrAMINE  50 mg Intramuscular Q6H PRN Yasmin Harvey MD      escitalopram  15 mg Oral Daily La Flores MD      haloperidol  1 mg Oral Q6H PRN Yasmin Harvey MD      haloperidol  15 mg Oral QPM aL Flores MD      haloperidol  15 mg Oral Daily HOLLI Anderson      haloperidol  2.5 mg Oral Q4H PRN Max 4/day Yasmin Harvey MD      haloperidol  5 mg Oral Q4H PRN Max 4/day Yasmin Harvey MD      hydrOXYzine HCL  100 mg Oral Q6H PRN Max 4/day Yasmin Harvey MD      Or    LORazepam  2 mg Intramuscular Q6H PRN Yasmin Harvey MD      hydrOXYzine HCL  25 mg Oral Q6H PRN Max 4/day Yasmin Harvey MD      lithium carbonate  1,200 mg Oral HS HOLLI Anderson      melatonin  3 mg Oral HS Yasmin Harvey MD      mirtazapine  45 mg Oral HS La Flores MD      nicotine polacrilex  4 mg Oral Q2H PRN Yasmin Harvey MD      OLANZapine  20 mg Oral HS La Flores MD      polyethylene glycol  17 g Oral Daily PRN Yasmin Harvey MD      polyethylene glycol  17 g Oral Daily Eileen Jensen,  "CRNP      senna-docusate sodium  1 tablet Oral Daily PRN Yasmin Harvey MD      traZODone  50 mg Oral HS PRN Yasmin Harvey MD         Risks, benefits and possible side effects of Medications:   Risks, benefits, and possible side effects of medications explained to patient and patient verbalizes understanding.       Mental Status Evaluation:  Appearance:  age appropriate, casually dressed, disheveled, and marginal grooming/hygiene   Behavior:  psychomotor retardation, calm, minimally interactive possessing intermittent eye contact, superficial, suspicious   Speech:  soft and scant, paucity of speech   Mood:  euthymic; \"OK\"   Affect:  blunted and mood-incongruent   Language sparse   Thought Process:  concrete   Thought Content:  delusions  paranoid   Perceptual Disturbances: None although appears internally preoccupied and distracted   Risk Potential: Suicidal Ideations none, Homicidal Ideations none, and Potential for Aggression No   Sensorium:  person, place, and time/date   Cognition:  recent and remote memory grossly intact   Consciousness:  alert and awake    Attention: attention span appeared shorter than expected for age   Insight:  limited   Judgment: limited   Intellect    Gait/Station: Not assessed; in bed   Motor Activity: no abnormal movements     Memory: Short and long term memory  fair     Progress Toward Goals: unchanged, as evidenced by their participation (or lack thereof) in individual, social and therapeutic milieu in addition to adherence to their medication regimen.    Recommended Treatment:   See above for assessment and plan.    Inpatient Psychiatric Certification: Based upon physical, mental and social evaluations, I certify that inpatient psychiatric services are medically necessary for this patient for a duration of  greater than 2  midnights for the treatment of Schizoaffective disorder, bipolar type (HCC)    Counseling/Coordination of Care    Total unit time spent today was greater " than 15 minutes. Greater than 50% of total time was spent with the patient and/or patient's relatives and/or coordination of patient's care.     Patient's rights, confidentiality, exceptions to confidentiality, use of electronic medical record including appropriate staff access to medical record regarding behavioral health services and consent to treatment were reviewed.    Note Share:     This note was not shared with the patient due to reasonable likelihood of causing patient harm     This note has been constructed using a voice recognition system.    There may be translation, syntax, or grammatical errors. If you have any questions, please contact the dictating provider.    Jordan Christopher Holter, DO

## 2023-12-02 NOTE — NURSING NOTE
"Pt is calm with a depressed and blunted affect and scant speech reporting \"mild depression\" and auditory hallucinations that are \"getting better.\" Pt says the voices are lower in volume and quantity. Ate breakfast in dayroom. Now back to bed. No unmet needs at this time.   "

## 2023-12-02 NOTE — NURSING NOTE
"Pt calm and cooperative, Pt pleasant upon approach, Pt visible on the unit seen on the phone and talking with peers in the dayroom while watching TV, Pt denied all pain anxiety and depression, Pt states \"I feel a lot better today than yesterday, I have very little bad thoughts compared to yesterday\" Pt denies HI and SI , Pt denied AH and VH, Pt took all scheduled HS meds, Q7 min safety checks maintained  "

## 2023-12-03 PROCEDURE — 99232 SBSQ HOSP IP/OBS MODERATE 35: CPT | Performed by: PSYCHIATRY & NEUROLOGY

## 2023-12-03 RX ADMIN — POLYETHYLENE GLYCOL 3350 17 G: 17 POWDER, FOR SOLUTION ORAL at 08:28

## 2023-12-03 RX ADMIN — CYANOCOBALAMIN TAB 1000 MCG 1000 MCG: 1000 TAB at 08:28

## 2023-12-03 RX ADMIN — HALOPERIDOL 15 MG: 5 TABLET ORAL at 17:21

## 2023-12-03 RX ADMIN — MIRTAZAPINE 45 MG: 30 TABLET, FILM COATED ORAL at 21:20

## 2023-12-03 RX ADMIN — BENZTROPINE MESYLATE 0.5 MG: 0.5 TABLET ORAL at 08:28

## 2023-12-03 RX ADMIN — HALOPERIDOL 15 MG: 5 TABLET ORAL at 08:28

## 2023-12-03 RX ADMIN — NICOTINE POLACRILEX 4 MG: 4 GUM, CHEWING ORAL at 13:24

## 2023-12-03 RX ADMIN — OLANZAPINE 20 MG: 10 TABLET, FILM COATED ORAL at 21:20

## 2023-12-03 RX ADMIN — MELATONIN TAB 3 MG 3 MG: 3 TAB at 21:20

## 2023-12-03 RX ADMIN — BENZTROPINE MESYLATE 0.5 MG: 0.5 TABLET ORAL at 17:21

## 2023-12-03 RX ADMIN — LITHIUM CARBONATE 1200 MG: 450 TABLET, EXTENDED RELEASE ORAL at 21:20

## 2023-12-03 RX ADMIN — ESCITSLOPRAM 15 MG: 5 TABLET ORAL at 08:28

## 2023-12-03 RX ADMIN — CHOLECALCIFEROL TAB 25 MCG (1000 UNIT) 2000 UNITS: 25 TAB at 08:28

## 2023-12-03 NOTE — NURSING NOTE
Pt is calm and scant with a depressed and constricted affect reporting ongoing auditory hallucinations of voices telling him he and his family are going to die. Pt continues to endorse improvement in severity and quantity of AH. Visible on the milieu at times for lunch and to speak on the phone. Otherwise isolative to room. No unmet needs at this time.

## 2023-12-03 NOTE — PROGRESS NOTES
"    Psychiatric Progress Note - Department of Behavioral Health   Alberto Berumen 28 y.o. male MRN: [id number]  Unit/Bed#: [facility] 383-02 Encounter: [id number]    ASSESSMENT & PLAN     Principal Problem:    Schizoaffective disorder, bipolar type (HCC)  Active Problems:    GERD (gastroesophageal reflux disease)    Medical clearance for psychiatric admission    Tobacco abuse    T wave inversion in EKG    Chronic idiopathic constipation    Vitamin B 12 deficiency    Vitamin D deficiency      Continue to promote patient participation in therapeutic milieu.  Continue medical management per medicine.  Continue previously prescribed psychotropic medication regimen; see below.  Continue behavioral health checks q.7 minutes.   Continue vitals per behavioral health unit protocol.  Discharge date per primary team; 201 commitment status.    SUBJECTIVE     Patient evaluated this a.m. for continuity of care. Patient was discussed at length with nursing and treatment team. Per nursing, patient remains calm, cooperative, isolative in the milieu, adherent to his medications without any acute adverse effects. No acute distress is noted throughout evaluation. Alberto Berumen denies suicidal/homicidal ideation; contracts for safety. Patient remains scant, sparse, minimally interactive, denying any/all psychiatric complaints/concerns, superficial, suspicious, paranoid, although per record review, patient was admitting to auditory hallucinations stating that his \"family will die.\"     PSYCHIATRIC REVIEW OF SYSTEMS     Behavior over the last 24 hours:  unchanged  Sleep: adequate  Appetite: adequate  Medication side effects: No    REVIEW OF SYSTEMS   Review of systems: no complaints    OBJECTIVE     Vital Signs in Past 24 Hours:  Temp:  [97.5 °F (36.4 °C)-97.9 °F (36.6 °C)] 97.5 °F (36.4 °C)  HR:  [] 102  Resp:  [18] 18  BP: (104-135)/(59-85) 135/85    Intake/Output in Past 24 hours:  No intake/output data recorded.  No intake/output " data recorded.        Laboratory Results:  I have personally reviewed all pertinent laboratory/tests results.  Most Recent Labs:   Lab Results   Component Value Date    WBC 10.15 11/15/2023    RBC 5.32 11/15/2023    HGB 12.8 11/15/2023    HCT 42.4 11/15/2023     11/15/2023    RDW 14.0 11/15/2023    NEUTROABS 6.25 11/15/2023    SODIUM 136 11/30/2023    K 4.2 11/30/2023    CL 99 11/30/2023    CO2 27 11/30/2023    BUN 11 11/30/2023    CREATININE 0.91 11/30/2023    GLUC 143 (H) 11/30/2023    GLUF 97 11/15/2023    CALCIUM 10.1 11/30/2023    AST 22 11/15/2023    ALT 40 11/15/2023    ALKPHOS 69 11/15/2023    TP 6.5 11/15/2023    ALB 4.1 11/15/2023    TBILI 0.45 11/15/2023    CHOLESTEROL 169 11/15/2023    HDL 37 (L) 11/15/2023    TRIG 117 11/15/2023    LDLCALC 109 (H) 11/15/2023    NONHDLC 132 11/15/2023    LITHIUM 0.6 11/30/2023    [id number] 1.062 11/15/2023    HGBA1C 4.9 11/15/2023    EAG 94 11/15/2023     Labs in last 72 hours:   Recent Labs     11/30/23 2036   SODIUM 136   K 4.2   CL 99   CO2 27   BUN 11   CREATININE 0.91   GLUC 143*   CALCIUM 10.1   LITHIUM 0.6     Admission Labs:   Admission on 11/14/2023   Component Date Value    Clarity, UA 11/15/2023 Clear     Color, UA 11/15/2023 Agatha (A)     Specific [place], UA 11/15/2023 1.020     pH, UA 11/15/2023 6.0     Glucose, UA 11/15/2023 Negative     Ketones, UA 11/15/2023 Negative     Occult Blood, UA 11/15/2023 Negative     Protein, UA 11/15/2023 15 (Trace) (A)     Nitrite, UA 11/15/2023 Negative     Bilirubin, UA 11/15/2023 Negative     Leukocytes, UA 11/15/2023 25.0 (A)     WBC, UA 11/15/2023 2-4     RBC, UA 11/15/2023 None Seen     Bacteria, UA 11/15/2023 Occasional     Epithelial Cells 11/15/2023 Occasional     MUCUS THREADS 11/15/2023 Occasional (A)     UROBILINOGEN UA 11/15/2023 4.0 (A)     Sodium 11/15/2023 139     Potassium 11/15/2023 3.9     Chloride 11/15/2023 105     CO2 11/15/2023 26     ANION GAP 11/15/2023 8     BUN 11/15/2023 8      Creatinine 11/15/2023 0.92     Glucose 11/15/2023 97     Glucose, Fasting 11/15/2023 97     Calcium 11/15/2023 9.1     AST 11/15/2023 22     ALT 11/15/2023 40     Alkaline Phosphatase 11/15/2023 69     Total Protein 11/15/2023 6.5     Albumin 11/15/2023 4.1     Total Bilirubin 11/15/2023 0.45     eGFR 11/15/2023 113     WBC 11/15/2023 10.15     RBC 11/15/2023 5.32     Hemoglobin 11/15/2023 12.8     Hematocrit 11/15/2023 42.4     MCV 11/15/2023 80 (L)     MCH 11/15/2023 24.1 (L)     MCHC 11/15/2023 30.2 (L)     RDW 11/15/2023 14.0     MPV 11/15/2023 10.6     Platelets 11/15/2023 252     nRBC 11/15/2023 0     Neutrophils Relative 11/15/2023 62     Immat GRANS % 11/15/2023 0     Lymphocytes Relative 11/15/2023 27     Monocytes Relative 11/15/2023 8     Eosinophils Relative 11/15/2023 2     Basophils Relative 11/15/2023 1     Neutrophils Absolute 11/15/2023 6.25     Immature Grans Absolute 11/15/2023 0.03     Lymphocytes Absolute 11/15/2023 2.73     Monocytes Absolute 11/15/2023 0.85     Eosinophils Absolute 11/15/2023 0.24     Basophils Absolute 11/15/2023 0.05     TSH 3RD GENERATON 11/15/2023 1.062     Vitamin B-12 11/15/2023 219     Folate 11/15/2023 7.8     Vit D, 25-Hydroxy 11/15/2023 7.6 (L)     Cholesterol 11/15/2023 169     Triglycerides 11/15/2023 117     HDL, Direct 11/15/2023 37 (L)     LDL Calculated 11/15/2023 109 (H)     Non-HDL-Chol (CHOL-HDL) 11/15/2023 132     Hemoglobin A1C 11/15/2023 4.9     EAG 11/15/2023 94     Iron Saturation 11/15/2023 24     TIBC 11/15/2023 302     Iron 11/15/2023 71     UIBC 11/15/2023 231     Ferritin 11/15/2023 56     Ventricular Rate 11/15/2023 62     Atrial Rate 11/15/2023 62     CT Interval 11/15/2023 142     QRSD Interval 11/15/2023 92     QT Interval 11/15/2023 392     QTC Interval 11/15/2023 397     P Axis 11/15/2023 49     QRS Axis 11/15/2023 45     T Wave [place] 11/15/2023 18     Lithium Lvl 11/18/2023 0.3 (L)     Sodium 11/18/2023 139     Potassium 11/18/2023 4.3      Chloride 11/18/2023 104     CO2 11/18/2023 26     ANION GAP 11/18/2023 9     BUN 11/18/2023 10     Creatinine 11/18/2023 1.07     Glucose 11/18/2023 119     Calcium 11/18/2023 9.7     eGFR 11/18/2023 94     Syphilis Total Antibody 11/18/2023 Non-reactive     Sodium 11/24/2023 136     Potassium 11/24/2023 4.6     Chloride 11/24/2023 100     CO2 11/24/2023 26     ANION GAP 11/24/2023 10     BUN 11/24/2023 11     Creatinine 11/24/2023 1.03     Glucose 11/24/2023 140     Calcium 11/24/2023 10.1     eGFR 11/24/2023 99     Lithium Lvl 11/24/2023 0.6     Lithium Lvl 11/26/2023 0.6     Lithium Lvl 11/30/2023 0.6     Sodium 11/30/2023 136     Potassium 11/30/2023 4.2     Chloride 11/30/2023 99     CO2 11/30/2023 27     ANION GAP 11/30/2023 10     BUN 11/30/2023 11     Creatinine 11/30/2023 0.91     Glucose 11/30/2023 143 (H)     Calcium 11/30/2023 10.1     eGFR 11/30/2023 115        Behavioral Health Medications: all current active meds have been reviewed, continue current psychiatric medications, and current meds:   Current Facility-Administered Medications   Medication Dose Route Frequency    acetaminophen (TYLENOL) tablet 650 mg  650 mg Oral Q6H PRN    acetaminophen (TYLENOL) tablet 650 mg  650 mg Oral Q4H PRN    acetaminophen (TYLENOL) tablet 975 mg  975 mg Oral Q6H PRN    aluminum-magnesium hydroxide-simethicone (MAALOX) oral suspension 30 mL  30 mL Oral Q4H PRN    haloperidol lactate (HALDOL) injection 2.5 mg  2.5 mg Intramuscular Q4H PRN Max 4/day    And    LORazepam (ATIVAN) injection 1 mg  1 mg Intramuscular Q4H PRN Max 4/day    And    benztropine (COGENTIN) injection 0.5 mg  0.5 mg Intramuscular Q4H PRN Max 4/day    haloperidol lactate (HALDOL) injection 5 mg  5 mg Intramuscular Q4H PRN Max 4/day    And    LORazepam (ATIVAN) injection 2 mg  2 mg Intramuscular Q4H PRN Max 4/day    And    benztropine (COGENTIN) injection 1 mg  1 mg Intramuscular Q4H PRN Max 4/day    benztropine (COGENTIN) tablet 0.5 mg  0.5 mg Oral  BID    benztropine (COGENTIN) tablet 1 mg  1 mg Oral Q4H PRN Max 6/day    bisacodyl (DULCOLAX) rectal suppository 10 mg  10 mg Rectal Daily PRN    cholecalciferol (VITAMIN D3) tablet 2,000 Units  2,000 Units Oral Daily    cyanocobalamin (VITAMIN B-12) tablet 1,000 mcg  1,000 mcg Oral Daily    hydrOXYzine HCL (ATARAX) tablet 50 mg  50 mg Oral Q6H PRN Max 4/day    Or    diphenhydrAMINE (BENADRYL) injection 50 mg  50 mg Intramuscular Q6H PRN    escitalopram (LEXAPRO) tablet 15 mg  15 mg Oral Daily    haloperidol (HALDOL) tablet 1 mg  1 mg Oral Q6H PRN    haloperidol (HALDOL) tablet 15 mg  15 mg Oral QPM    haloperidol (HALDOL) tablet 15 mg  15 mg Oral Daily    haloperidol (HALDOL) tablet 2.5 mg  2.5 mg Oral Q4H PRN Max 4/day    haloperidol (HALDOL) tablet 5 mg  5 mg Oral Q4H PRN Max 4/day    hydrOXYzine HCL (ATARAX) tablet 100 mg  100 mg Oral Q6H PRN Max 4/day    Or    LORazepam (ATIVAN) injection 2 mg  2 mg Intramuscular Q6H PRN    hydrOXYzine HCL (ATARAX) tablet 25 mg  25 mg Oral Q6H PRN Max 4/day    lithium carbonate (LITHOBID) CR tablet 1,200 mg  1,200 mg Oral HS    melatonin tablet 3 mg  3 mg Oral HS    mirtazapine (REMERON) tablet 45 mg  45 mg Oral HS    nicotine polacrilex (NICORETTE) gum 4 mg  4 mg Oral Q2H PRN    OLANZapine (ZyPREXA) tablet 20 mg  20 mg Oral HS    polyethylene glycol (MIRALAX) packet 17 g  17 g Oral Daily PRN    polyethylene glycol (MIRALAX) packet 17 g  17 g Oral Daily    senna-docusate sodium (SENOKOT S) 8.6-50 mg per tablet 1 tablet  1 tablet Oral Daily PRN    traZODone (DESYREL) tablet 50 mg  50 mg Oral HS PRN   .    Current Facility-Administered Medications   Medication Dose Route Frequency Provider Last Rate    acetaminophen  650 mg Oral Q6H PRN Yasmin Harvey MD      acetaminophen  650 mg Oral Q4H PRN Yasmin Harvey MD      acetaminophen  975 mg Oral Q6H PRN Yasmin Harvey MD      aluminum-magnesium hydroxide-simethicone  30 mL Oral Q4H PRN Yasmin Harvey MD      haloperidol  lactate  2.5 mg Intramuscular Q4H PRN Max 4/day Yasmin Harvey MD      And    LORazepam  1 mg Intramuscular Q4H PRN Max 4/day Yasmin Harvey MD      And    benztropine  0.5 mg Intramuscular Q4H PRN Max 4/day Yasmin Harvey MD      haloperidol lactate  5 mg Intramuscular Q4H PRN Max 4/day Yasmin Harvey MD      And    LORazepam  2 mg Intramuscular Q4H PRN Max 4/day Yasmin Harvey MD      And    benztropine  1 mg Intramuscular Q4H PRN Max 4/day Yasmin Harvey MD      benztropine  0.5 mg Oral BID La Flores MD      benztropine  1 mg Oral Q4H PRN Max 6/day Yasmin Harvey MD      bisacodyl  10 mg Rectal Daily PRN Yasmin Harvey MD      cholecalciferol  2,000 Units Oral Daily HOLLI Monge      cyanocobalamin  1,000 mcg Oral Daily HOLLI Monge      hydrOXYzine HCL  50 mg Oral Q6H PRN Max 4/day Yasmin Harvey MD      Or    diphenhydrAMINE  50 mg Intramuscular Q6H PRN Yasmin Harvey MD      escitalopram  15 mg Oral Daily La Flores MD      haloperidol  1 mg Oral Q6H PRN Yasmin Harvey MD      haloperidol  15 mg Oral QPM La Flores MD      haloperidol  15 mg Oral Daily HOLLI Anderson      haloperidol  2.5 mg Oral Q4H PRN Max 4/day Yasmin Harvey MD      haloperidol  5 mg Oral Q4H PRN Max 4/day Yasmin Harvey MD      hydrOXYzine HCL  100 mg Oral Q6H PRN Max 4/day Yasmin Harvey MD      Or    LORazepam  2 mg Intramuscular Q6H PRN Yasmin Harvey MD      hydrOXYzine HCL  25 mg Oral Q6H PRN Max 4/day Yasmin Harvey MD      lithium carbonate  1,200 mg Oral HS HOLLI Anderson      melatonin  3 mg Oral HS Yasmin Harvey MD      mirtazapine  45 mg Oral HS La Flores MD      nicotine polacrilex  4 mg Oral Q2H PRN Yasmin Harvey MD      OLANZapine  20 mg Oral HS La Flores MD      polyethylene glycol  17 g Oral Daily PRN Yasmin Harvey MD      polyethylene glycol  17 g Oral Daily Eileen Jensen,  "CRNP      senna-docusate sodium  1 tablet Oral Daily PRN Yasmin Harvey MD      traZODone  50 mg Oral HS PRN Yasmin Harvey MD         Risks, benefits and possible side effects of Medications:   Risks, benefits, and possible side effects of medications explained to patient and patient verbalizes understanding.       Mental Status Evaluation:  Appearance:  age appropriate, casually dressed, disheveled, and marginal grooming/hygiene   Behavior:  psychomotor retardation, calm, cooperative, superficial, suspicious   Speech:  soft and scant   Mood:  euthymic; \"fine\"   Affect:  blunted   Language sparse   Thought Process:  concrete   Thought Content:  delusions  paranoid   Perceptual Disturbances: None although appears internally preoccupied   Risk Potential: Suicidal Ideations none, Homicidal Ideations none, and Potential for Aggression No   Sensorium:  person, place, and time/date   Cognition:  recent and remote memory grossly intact   Consciousness:  alert and awake    Attention: attention span appeared shorter than expected for age   Insight:  limited   Judgment: limited   Intellect    Gait/Station: Not assessed; in bed   Motor Activity: no abnormal movements     Memory: Short and long term memory  fair     Progress Toward Goals: unchanged, as evidenced by their participation (or lack thereof) in individual, social and therapeutic milieu in addition to adherence to their medication regimen.    Recommended Treatment:   See above for assessment and plan.    Inpatient Psychiatric Certification: Based upon physical, mental and social evaluations, I certify that inpatient psychiatric services are medically necessary for this patient for a duration of  greater than 2  midnights for the treatment of Schizoaffective disorder, bipolar type (HCC)    Counseling/Coordination of Care    Total unit time spent today was greater than 15 minutes. Greater than 50% of total time was spent with the patient and/or patient's " relatives and/or coordination of patient's care.     Patient's rights, confidentiality, exceptions to confidentiality, use of electronic medical record including appropriate staff access to medical record regarding behavioral health services and consent to treatment were reviewed.    Note Share:     This note was not shared with the patient due to reasonable likelihood of causing patient harm     This note has been constructed using a voice recognition system.    There may be translation, syntax, or grammatical errors. If you have any questions, please contact the dictating provider.    Jordan Christopher Holter, DO

## 2023-12-03 NOTE — NURSING NOTE
Pt is cooperative and med compliant. Pt seen laying in bed. Pt denies VH but endorses AH saying his family is going to die. Pt denies depression and anxiety at this time. No unmet needs reported. Will continue to monitor.

## 2023-12-04 PROCEDURE — 99232 SBSQ HOSP IP/OBS MODERATE 35: CPT | Performed by: PSYCHIATRY & NEUROLOGY

## 2023-12-04 RX ADMIN — MELATONIN TAB 3 MG 3 MG: 3 TAB at 21:11

## 2023-12-04 RX ADMIN — OLANZAPINE 20 MG: 10 TABLET, FILM COATED ORAL at 21:10

## 2023-12-04 RX ADMIN — CHOLECALCIFEROL TAB 25 MCG (1000 UNIT) 2000 UNITS: 25 TAB at 08:48

## 2023-12-04 RX ADMIN — LITHIUM CARBONATE 1200 MG: 450 TABLET, EXTENDED RELEASE ORAL at 21:10

## 2023-12-04 RX ADMIN — MIRTAZAPINE 45 MG: 30 TABLET, FILM COATED ORAL at 21:10

## 2023-12-04 RX ADMIN — ESCITSLOPRAM 15 MG: 5 TABLET ORAL at 08:48

## 2023-12-04 RX ADMIN — CYANOCOBALAMIN TAB 1000 MCG 1000 MCG: 1000 TAB at 08:48

## 2023-12-04 RX ADMIN — HALOPERIDOL 15 MG: 5 TABLET ORAL at 17:44

## 2023-12-04 RX ADMIN — NICOTINE POLACRILEX 4 MG: 4 GUM, CHEWING ORAL at 18:40

## 2023-12-04 RX ADMIN — BENZTROPINE MESYLATE 0.5 MG: 0.5 TABLET ORAL at 08:48

## 2023-12-04 RX ADMIN — POLYETHYLENE GLYCOL 3350 17 G: 17 POWDER, FOR SOLUTION ORAL at 08:48

## 2023-12-04 RX ADMIN — BENZTROPINE MESYLATE 0.5 MG: 0.5 TABLET ORAL at 17:45

## 2023-12-04 RX ADMIN — HALOPERIDOL 15 MG: 5 TABLET ORAL at 08:48

## 2023-12-04 NOTE — PROGRESS NOTES
Progress Note - Behavioral Health     Alberto Berumen 28 y.o. male MRN: [id number]   Unit/Bed#: [facility] 383-02 Encounter: [id number]    Documentation, nursing notes, medication reconciliation, labs, and vitals reviewed. Patient was seen for continuing care and reviewed with treatment team. No acute events over the past 24 hours.  Per nursing report, continues at times isolative to his room, at times more social on the unit.  Ongoing auditory hallucinations telling him he may die or he may go to hell, but denies command type hallucinations.    No medication changes over the past 24 hours. Continues to tolerate current medications with no adverse effects.     On evaluation today, he is seen in his room and in bed.  But more animated and interactive in conversation.  He reports minimal improvement in hallucinations since increase of Haldol on Friday.  He does note gradual improvements but very slow.  We discussed the option of Clozaril.  He is agreeable to Clozaril but concerned about weekly blood work, as he states he does not believe he has insurance coverage for outpatient labs right now.  Will review with case management.    No suicidal ideation, plan, or intent and does not exhibit any symptoms of aimee on evaluation.    Psychiatric ROS:  Behavior over the last 24 hours: unchanged  Sleep: hypersomnia  Appetite: fair  Medication side effects: No   ROS: no complaints, all other systems are negative    Mental Status Evaluation:    Appearance:  Improving improving   Behavior:  guarded   Speech:  normal rate, normal volume   Mood:  improved, depressed   Affect:  brighter   Thought Process:  goal directed   Associations: intact associations   Thought Content:  no overt delusions   Perceptual Disturbances: auditory hallucinations still present, but less frequent   Risk Potential: Suicidal ideation - None  Homicidal ideation - None  Potential for aggression - No   Sensorium:  oriented to person, place, time/date, and  situation   Memory:  recent and remote memory grossly intact   Consciousness:  alert and awake   Attention/Concentration: decreased concentration and decreased attention span   Insight:  improved   Judgment: improved   Gait/Station: normal gait/station, normal balance   Motor Activity: no abnormal movements     Vital signs in last 24 hours:    Temp:  [97.7 °F (36.5 °C)] 97.7 °F (36.5 °C)  HR:  [103] 103  Resp:  [16] 16  BP: (151)/(83) 151/83    Laboratory results: I have personally reviewed all pertinent laboratory/tests results    Results from the past 24 hours: No results found for this or any previous visit (from the past 24 hour(s)).    Suicide/Homicide Risk Assessment:    Risk of Harm to Self:   Current Specific Risk Factors include: current psychotic symptoms  Protective Factors: no current suicidal ideation, ability to communicate with staff on the unit, able to contract for safety on the unit, taking medications as ordered on the unit  Based on today's assessment, Alberto presents the following risk of harm to self: minimal    Risk of Harm to Others:  Current Specific Risk Factors include: current psychotic symptoms  Protective Factors: no current homicidal ideation, able to communicate with staff on the unit, compliant with medications on the unit as ordered  Based on today's assessment, Alberto presents the following risk of harm to others: minimal    The following interventions are recommended: behavioral checks every 7 minutes, continued hospitalization on locked unit    Progress Toward Goals: limited improvement, participates slightly more in milieu therapy    Assessment/Plan   Principal Problem:    Schizoaffective disorder, bipolar type (HCC)  Active Problems:    GERD (gastroesophageal reflux disease)    Medical clearance for psychiatric admission    Tobacco abuse    T wave inversion in EKG    Chronic idiopathic constipation    Vitamin B 12 deficiency    Vitamin D deficiency      Recommended Treatment:      Planned medication and treatment changes:    All current active medications have been reviewed  Encourage group therapy, milieu therapy and occupational therapy  Behavioral Health checks every 7 minutes  He will have workup for Clozaril if insurance allows  Continue all other medications:    Current Facility-Administered Medications   Medication Dose Route Frequency Provider Last Rate    acetaminophen  650 mg Oral Q6H PRN Yasmin Harvey MD      acetaminophen  650 mg Oral Q4H PRN Yasmin Harvey MD      acetaminophen  975 mg Oral Q6H PRN Yasmin Harvey MD      aluminum-magnesium hydroxide-simethicone  30 mL Oral Q4H PRN Yasmin Harvey MD      haloperidol lactate  2.5 mg Intramuscular Q4H PRN Max 4/day Yasmin Harvey MD      And    LORazepam  1 mg Intramuscular Q4H PRN Max 4/day Yasmin Harvey MD      And    benztropine  0.5 mg Intramuscular Q4H PRN Max 4/day Yasmin Harvey MD      haloperidol lactate  5 mg Intramuscular Q4H PRN Max 4/day Yasmin Harvey MD      And    LORazepam  2 mg Intramuscular Q4H PRN Max 4/day Yasmin Harvey MD      And    benztropine  1 mg Intramuscular Q4H PRN Max 4/day Yasmin Harvey MD      benztropine  0.5 mg Oral BID La Flores MD      benztropine  1 mg Oral Q4H PRN Max 6/day Yasmin Harvey MD      bisacodyl  10 mg Rectal Daily PRN Yasmin Harvey MD      cholecalciferol  2,000 Units Oral Daily HOLLI Monge      cyanocobalamin  1,000 mcg Oral Daily HOLLI Monge      hydrOXYzine HCL  50 mg Oral Q6H PRN Max 4/day Yasmin Harvey MD      Or    diphenhydrAMINE  50 mg Intramuscular Q6H PRN Yasmin Harvey MD      escitalopram  15 mg Oral Daily La Flores MD      haloperidol  1 mg Oral Q6H PRN Yasmin Harvey MD      haloperidol  15 mg Oral QPM La Flores MD      haloperidol  15 mg Oral Daily HOLLI Anderson      haloperidol  2.5 mg Oral Q4H PRN Max 4/day Yasmin Harvey MD      haloperidol  5  mg Oral Q4H PRN Max 4/day Yasmin Harvey MD      hydrOXYzine HCL  100 mg Oral Q6H PRN Max 4/day Yasmin Harvey MD      Or    LORazepam  2 mg Intramuscular Q6H PRN Yasmin Harvey MD      hydrOXYzine HCL  25 mg Oral Q6H PRN Max 4/day Yasmin Harvey MD      lithium carbonate  1,200 mg Oral HS HOLLI Anderson      melatonin  3 mg Oral HS Yasmin Harvey MD      mirtazapine  45 mg Oral HS La Flores MD      nicotine polacrilex  4 mg Oral Q2H PRN Yasmin Harvey MD      OLANZapine  20 mg Oral HS La Flores MD      polyethylene glycol  17 g Oral Daily PRN Yasmin Harvey MD      polyethylene glycol  17 g Oral Daily HOLLI Monge      senna-docusate sodium  1 tablet Oral Daily PRN Yasmin Harvey MD      traZODone  50 mg Oral HS PRN Yasmin Harvey MD       Risks / Benefits of Treatment:    Risks, benefits, and possible side effects of medications explained to patient and patient verbalizes understanding and agreement for treatment.  The patient has a history of at least 3 antipsychotic medication trials and at this time requires treatment with 2 antipsychotic agents (Zyprexa and Haldol) due to failed multiple trials of monotherapy.    Counseling / Coordination of Care:    Patient's progress discussed with staff in treatment team meeting.  Medications, treatment progress and treatment plan reviewed with patient.  Discussed with patient plan for Clozaril and risks and requirements, any he is willing for same.    HOLLI Anderson 12/04/23

## 2023-12-04 NOTE — NURSING NOTE
Pt is calm, quiet, and constricted. Pt reports improvement since being here. Pt is med complaint and cooperative. Pt denies depression and anxiety at this time. Pt denies SI/HI and VH but reports continued AH. No unmet needs reported. Will continue to monitor.

## 2023-12-04 NOTE — CASE MANAGEMENT
Writer called [facility] to inquire about MA pending application. [facility] worker shared that patient [place] still active that is preventing him from obtaining benefit in [place]. Writer will call [place] to close out benefit with patient tomorrow.

## 2023-12-04 NOTE — NURSING NOTE
Pt denies SI & HI, is observed resting in bed, quiet and calm, wearing personal attire by choice. Insight is neutral, minimal visibility outside of bedroom, limited group activity. Appears unmotivated to advance treatment but verbalizes ongoing auditory hallucinations of voices telling him to kill himself. Compliant with medications, follows directions, oriented without delusional thinking.

## 2023-12-04 NOTE — PROGRESS NOTES
12/04/23 1017   Team Meeting   Meeting Type Daily Rounds   Team Members Present   Team Members Present Physician;Nurse;   Physician Team Member Mark   Nursing Team Member Giancarlo   Care Management Team Member Miguelito Camarillo   Patient/Family Present   Patient Present No   Patient's Family Present No     Patient currently holds 201 status. Patient confirms AH but denies VH,SI,HI. Patient is medication and meal compliant. Patient is currently taking 0.5 cogentin TID, Lexapro 15 mg daily,  Haldol 15 mg TID, Remeron 45 mg HS, Zyprexa 20 mg HS. Patient discharge date and plant to be determined.

## 2023-12-05 LAB — LITHIUM SERPL-SCNC: 0.6 MMOL/L (ref 0.6–1.2)

## 2023-12-05 PROCEDURE — 80178 ASSAY OF LITHIUM: CPT | Performed by: NURSE PRACTITIONER

## 2023-12-05 PROCEDURE — 99232 SBSQ HOSP IP/OBS MODERATE 35: CPT | Performed by: PSYCHIATRY & NEUROLOGY

## 2023-12-05 RX ADMIN — LITHIUM CARBONATE 1200 MG: 450 TABLET, EXTENDED RELEASE ORAL at 21:21

## 2023-12-05 RX ADMIN — NICOTINE POLACRILEX 4 MG: 4 GUM, CHEWING ORAL at 19:09

## 2023-12-05 RX ADMIN — BENZTROPINE MESYLATE 0.5 MG: 0.5 TABLET ORAL at 17:29

## 2023-12-05 RX ADMIN — HALOPERIDOL 15 MG: 5 TABLET ORAL at 08:47

## 2023-12-05 RX ADMIN — ESCITSLOPRAM 15 MG: 5 TABLET ORAL at 08:46

## 2023-12-05 RX ADMIN — OLANZAPINE 20 MG: 10 TABLET, FILM COATED ORAL at 21:21

## 2023-12-05 RX ADMIN — MELATONIN TAB 3 MG 3 MG: 3 TAB at 21:21

## 2023-12-05 RX ADMIN — MIRTAZAPINE 45 MG: 30 TABLET, FILM COATED ORAL at 21:21

## 2023-12-05 RX ADMIN — POLYETHYLENE GLYCOL 3350 17 G: 17 POWDER, FOR SOLUTION ORAL at 08:46

## 2023-12-05 RX ADMIN — BENZTROPINE MESYLATE 0.5 MG: 0.5 TABLET ORAL at 08:46

## 2023-12-05 RX ADMIN — CHOLECALCIFEROL TAB 25 MCG (1000 UNIT) 2000 UNITS: 25 TAB at 08:47

## 2023-12-05 RX ADMIN — CYANOCOBALAMIN TAB 1000 MCG 1000 MCG: 1000 TAB at 08:47

## 2023-12-05 RX ADMIN — HALOPERIDOL 15 MG: 5 TABLET ORAL at 17:28

## 2023-12-05 NOTE — NURSING NOTE
"Pt isolated to his bed,  endorses AH of voices tell him him\"you and your family are going to die.\" Pt reports his is using prayer to cope.  Pt denies HI/SI/VH.  Pt compliant with medication and meals.  NO remarkable behaviors observed.   "

## 2023-12-05 NOTE — PROGRESS NOTES
12/05/23 1524   Team Meeting   Meeting Type Daily Rounds   Team Members Present   Team Members Present Physician;Nurse;   Physician Team Member Mark   Nursing Team Member Giancarlo   Care Management Team Member Miguelito Camarillo   Patient/Family Present   Patient Present No   Patient's Family Present No     Patient currently holds 201 status. Patient denies SI/HI/VH but confirms AH. Patient is compliant with medication and meals.  Patient is currently taking 0.5 cogentin BID, Lexapro 15 mg daily,  Haldol 15 mg BID, Remeron 45 mg HS, Zyprexa 20 mg HS. Patient discharge date and plant to be determined.

## 2023-12-05 NOTE — PLAN OF CARE
Problem: PSYCHOSIS  Goal: Will report no hallucinations or delusions  Description: Interventions:  - Administer medication as  ordered  - Every waking shifts and PRN assess for the presence of hallucinations and or delusions  - Assist with reality testing to support increasing orientation  - Assess if patient's hallucinations or delusions are encouraging self-harm or harm to others and intervene as appropriate  Outcome: Progressing     Problem: ANXIETY  Goal: Will report anxiety at manageable levels  Description: INTERVENTIONS:  - Administer medication as ordered  - Teach and encourage coping skills  - Provide emotional support  - Assess patient/family for anxiety and ability to cope  Outcome: Progressing     Problem: Ineffective Coping  Goal: Participates in unit activities  Description: Interventions:  - Provide therapeutic environment   - Provide required programming   - Redirect inappropriate behaviors   Outcome: Progressing     Problem: Depression  Goal: Treatment Goal: Demonstrate behavioral control of depressive symptoms, verbalize feelings of improved mood/affect, and adopt new coping skills prior to discharge  Outcome: Progressing  Goal: Verbalize thoughts and feelings  Description: Interventions:  - Assess and re-assess patient's level of risk   - Engage patient in 1:1 interactions, daily, for a minimum of 15 minutes   - Encourage patient to express feelings, fears, frustrations, hopes   Outcome: Progressing  Goal: Refrain from isolation  Description: Interventions:  - Develop a trusting relationship   - Encourage socialization   Outcome: Progressing  Goal: Refrain from self-neglect  Outcome: Progressing      Yes

## 2023-12-05 NOTE — NURSING NOTE
PT observed in the Dayroom reading a bible cooperative and calm upon approach. PT denies SI/HI/VH, continues with  endorsement, appears bright when talking to writer. PT compliant with HS meds and no unmet needs at this time.

## 2023-12-05 NOTE — PROGRESS NOTES
Progress Note - Behavioral Health     Alberto Berumen 28 y.o. male MRN: [id number]   Unit/Bed#: [facility] 383-02 Encounter: [id number]    Documentation, nursing notes, medication reconciliation, labs, and vitals reviewed. Patient was seen for continuing care and reviewed with treatment team. No acute events over the past 24 hours.  Per nursing report, he had a spin out of his room more in the evening and noted to be brighter.  Continues with auditory hallucinations.    No medication changes over the past 24 hours. Continues to tolerate current medications with no adverse effects.     On evaluation today, he is again seen in his room and in bed.  He continues to show improvements in affect and mood and noted brighter and more talkative.  He continues to endorse auditory hallucinations, not of the command type, but making derogatory comments or telling him he is going to help.  I did review with him that we cannot yet start Clozaril or long-acting injectables due to him not yet having [place] medical assistance.  He does confirm with me his long-term plan to stay in [place] living with his aunt.     No suicidal ideation, plan, or intent and does not exhibit any symptoms of aimee on evaluation.    Psychiatric ROS:  Behavior over the last 24 hours: minimal improvement  Sleep: slept off and on  Appetite: improving  Medication side effects: No   ROS: no complaints, all other systems are negative    Mental Status Evaluation:    Appearance:  marginal hygiene   Behavior:  cooperative, calm   Speech:  normal rate, normal volume   Mood:  improved, depressed   Affect:  flat   Thought Process:  more coherent   Associations: concrete associations   Thought Content:  some paranoia   Perceptual Disturbances: auditory hallucinations of derogatory statements about himself, auditory hallucinations still present, but less frequent   Risk Potential: Suicidal ideation - None  Homicidal ideation - None  Potential for aggression - No    Sensorium:  oriented to person, place, time/date, and situation   Memory:  recent and remote memory grossly intact   Consciousness:  alert and awake   Attention/Concentration: attention span and concentration appear shorter than expected for age   Insight:  limited   Judgment: limited   Gait/Station: normal gait/station, normal balance   Motor Activity: no abnormal movements     Vital signs in last 24 hours:    Temp:  [97.5 °F (36.4 °C)-98 °F (36.7 °C)] 97.5 °F (36.4 °C)  HR:  [100] 100  Resp:  [16] 16  BP: (139-141)/(72-78) 139/72    Laboratory results: I have personally reviewed all pertinent laboratory/tests results    Results from the past 24 hours: No results found for this or any previous visit (from the past 24 hour(s)).    Suicide/Homicide Risk Assessment:    Risk of Harm to Self:   Current Specific Risk Factors include: current depressive symptoms, current psychotic symptoms  Protective Factors: no current suicidal ideation, ability to communicate with staff on the unit, able to contract for safety on the unit, taking medications as ordered on the unit  Based on today's assessment, Alberto presents the following risk of harm to self: minimal    Risk of Harm to Others:  Current Specific Risk Factors include: current psychotic symptoms  Protective Factors: no current homicidal ideation, able to communicate with staff on the unit, improved impulse control, compliant with medications on the unit as ordered  Based on today's assessment, Alberto presents the following risk of harm to others: minimal    The following interventions are recommended: behavioral checks every 7 minutes, continued hospitalization on locked unit    Progress Toward Goals: improving slowly    Assessment/Plan   Principal Problem:    Schizoaffective disorder, bipolar type (HCC)  Active Problems:    GERD (gastroesophageal reflux disease)    Medical clearance for psychiatric admission    Tobacco abuse    T wave inversion in EKG    Chronic  idiopathic constipation    Vitamin B 12 deficiency    Vitamin D deficiency      Recommended Treatment:     Planned medication and treatment changes:    All current active medications have been reviewed  Encourage group therapy, milieu therapy and occupational therapy  Behavioral Health checks every 7 minutes  Recheck Lithium level today    Current Facility-Administered Medications   Medication Dose Route Frequency Provider Last Rate    acetaminophen  650 mg Oral Q6H PRN Yasmin Harvey MD      acetaminophen  650 mg Oral Q4H PRN Yasmin Harvey MD      acetaminophen  975 mg Oral Q6H PRN Yasmin Harvey MD      aluminum-magnesium hydroxide-simethicone  30 mL Oral Q4H PRN Yasmin Harvey MD      haloperidol lactate  2.5 mg Intramuscular Q4H PRN Max 4/day Yasmin Harvey MD      And    LORazepam  1 mg Intramuscular Q4H PRN Max 4/day Yasmin Harvey MD      And    benztropine  0.5 mg Intramuscular Q4H PRN Max 4/day Yasmin Harvey MD      haloperidol lactate  5 mg Intramuscular Q4H PRN Max 4/day Yasmin Harvey MD      And    LORazepam  2 mg Intramuscular Q4H PRN Max 4/day Yasmin Harvey MD      And    benztropine  1 mg Intramuscular Q4H PRN Max 4/day Yasmin Harvey MD      benztropine  0.5 mg Oral BID La Flores MD      benztropine  1 mg Oral Q4H PRN Max 6/day Yasmin Harvey MD      bisacodyl  10 mg Rectal Daily PRN Yasmin Harvey MD      cholecalciferol  2,000 Units Oral Daily HOLLI Monge      cyanocobalamin  1,000 mcg Oral Daily HOLLI Monge      hydrOXYzine HCL  50 mg Oral Q6H PRN Max 4/day Yasmin Harvey MD      Or    diphenhydrAMINE  50 mg Intramuscular Q6H PRN Yasmin Harvey MD      escitalopram  15 mg Oral Daily La Flores MD      haloperidol  1 mg Oral Q6H PRN Yasmin Harvey MD      haloperidol  15 mg Oral QPM La Flores MD      haloperidol  15 mg Oral Daily HOLLI Anderson      haloperidol  2.5 mg Oral Q4H PRN Max  4/day Yasmin Harvey MD      haloperidol  5 mg Oral Q4H PRN Max 4/day Yasmin Harvey MD      hydrOXYzine HCL  100 mg Oral Q6H PRN Max 4/day Yasmin Harvey MD      Or    LORazepam  2 mg Intramuscular Q6H PRN Yasmin Harvey MD      hydrOXYzine HCL  25 mg Oral Q6H PRN Max 4/day Yasmin Harvey MD      lithium carbonate  1,200 mg Oral HS HOLLI Anderson      melatonin  3 mg Oral HS Yasmin Harvey MD      mirtazapine  45 mg Oral HS La Flores MD      nicotine polacrilex  4 mg Oral Q2H PRN Yasmin Harvey MD      OLANZapine  20 mg Oral HS La Flores MD      polyethylene glycol  17 g Oral Daily PRN Yasmin Harvey MD      polyethylene glycol  17 g Oral Daily HOLLI Monge      senna-docusate sodium  1 tablet Oral Daily PRN Yasmin Harvey MD      traZODone  50 mg Oral HS PRN Yasmin Harvey MD       Risks / Benefits of Treatment:    Risks, benefits, and possible side effects of medications explained to patient and patient verbalizes understanding and agreement for treatment.  The patient has a history of at least 3 antipsychotic medication trials and at this time requires treatment with 2 antipsychotic agents (Zyprexa and Haldol) due to failed multiple trials of monotherapy.    Counseling / Coordination of Care:    Patient's progress discussed with staff in treatment team meeting.  Medications, treatment progress and treatment plan reviewed with patient.    HOLLI Anderson 12/05/23

## 2023-12-06 PROCEDURE — 99232 SBSQ HOSP IP/OBS MODERATE 35: CPT | Performed by: PSYCHIATRY & NEUROLOGY

## 2023-12-06 RX ORDER — LITHIUM CARBONATE 300 MG/1
300 TABLET, FILM COATED, EXTENDED RELEASE ORAL DAILY
Status: DISCONTINUED | OUTPATIENT
Start: 2023-12-06 | End: 2024-01-17

## 2023-12-06 RX ADMIN — HALOPERIDOL 15 MG: 5 TABLET ORAL at 17:54

## 2023-12-06 RX ADMIN — CYANOCOBALAMIN TAB 1000 MCG 1000 MCG: 1000 TAB at 08:49

## 2023-12-06 RX ADMIN — LITHIUM CARBONATE 1200 MG: 450 TABLET, EXTENDED RELEASE ORAL at 21:16

## 2023-12-06 RX ADMIN — MIRTAZAPINE 45 MG: 30 TABLET, FILM COATED ORAL at 21:16

## 2023-12-06 RX ADMIN — MELATONIN TAB 3 MG 3 MG: 3 TAB at 21:16

## 2023-12-06 RX ADMIN — BENZTROPINE MESYLATE 0.5 MG: 0.5 TABLET ORAL at 17:54

## 2023-12-06 RX ADMIN — ESCITSLOPRAM 15 MG: 5 TABLET ORAL at 08:48

## 2023-12-06 RX ADMIN — BENZTROPINE MESYLATE 0.5 MG: 0.5 TABLET ORAL at 08:49

## 2023-12-06 RX ADMIN — LITHIUM CARBONATE 300 MG: 300 TABLET, EXTENDED RELEASE ORAL at 11:00

## 2023-12-06 RX ADMIN — CHOLECALCIFEROL TAB 25 MCG (1000 UNIT) 2000 UNITS: 25 TAB at 08:49

## 2023-12-06 RX ADMIN — POLYETHYLENE GLYCOL 3350 17 G: 17 POWDER, FOR SOLUTION ORAL at 08:48

## 2023-12-06 RX ADMIN — OLANZAPINE 20 MG: 10 TABLET, FILM COATED ORAL at 21:15

## 2023-12-06 RX ADMIN — HALOPERIDOL 15 MG: 5 TABLET ORAL at 08:49

## 2023-12-06 NOTE — NURSING NOTE
"Pt isolated to room,  napping in bed,  Absent for breakfast, present for lunch.  Pt compliant with all meds.  Pt continues to report AH of voices telling him \"you and your family are going to die\".  Pt stated \"they're always gonna be there \"referring to the voices.   But denies ever having feelings to act in any aggressive way toward his family or himself.  Pt denies HI/SI/VH   "

## 2023-12-06 NOTE — PROGRESS NOTES
"Progress Note - Behavioral Health     Alberto Berumen 28 y.o. male MRN: [id number]   Unit/Bed#: [facility] 383-02 Encounter: [id number]    Documentation, nursing notes, medication reconciliation, labs, and vitals reviewed. Patient was seen for continuing care and reviewed with treatment team. No acute events over the past 24 hours.  Per nursing report, has been a little more visible in the milieu, coming out of his room more especially in the evening.  Continues to endorse auditory hallucinations threatening him and his family were of a derogatory nature.    No medication changes over the past 24 hours. Continues to tolerate current medications with no adverse effects. On evaluation today, he does confirm that auditory hallucinations continue \"all the time \".  But he does clarify that he does hear voices all the time, even at baseline.  However, at baseline the voices are not as threatening and distressful, not as intense or as frequent.  He states he is currently improving slowly and about 40% back to baseline.  We discussed results of his lithium level, still at 0.6.  He is agreeable to increase of lithium to start this morning.    No suicidal ideation, plan, or intent, and does not exhibit any symptoms of aimee on evaluation.    Psychiatric ROS:  Behavior over the last 24 hours: slowly improving  Sleep: slept off and on  Appetite: fair  Medication side effects: No   ROS: no complaints, all other systems are negative    Mental Status Evaluation:    Appearance:  marginal hygiene   Behavior:  cooperative, guarded   Speech:  slow, scant   Mood:  depressed   Affect:  flat   Thought Process:  goal directed   Associations: intact associations   Thought Content:  some paranoia   Perceptual Disturbances: auditory hallucinations still present, but less frequent   Risk Potential: Suicidal ideation - None  Homicidal ideation - None  Potential for aggression - No   Sensorium:  oriented to person, place, and time/date   Memory:  " recent and remote memory grossly intact   Consciousness:  alert and awake   Attention/Concentration: attention span and concentration are age appropriate   Insight:  improving   Judgment: improving   Gait/Station: normal gait/station, normal balance   Motor Activity: no abnormal movements     Vital signs in last 24 hours:    Temp:  [97.7 °F (36.5 °C)-97.9 °F (36.6 °C)] 97.7 °F (36.5 °C)  HR:  [78-93] 78  Resp:  [18] 18  BP: (109-132)/(70-86) 109/70    Laboratory results: I have personally reviewed all pertinent laboratory/tests results    Results from the past 24 hours:   Recent Results (from the past 24 hour(s))   Lithium level    Collection Time: 12/05/23  8:32 PM   Result Value Ref Range    Lithium Lvl 0.6 0.6 - 1.2 mmol/L       Suicide/Homicide Risk Assessment:    Risk of Harm to Self:   Current Specific Risk Factors include: mental illness diagnosis, current psychotic symptoms  Protective Factors: no current suicidal ideation, ability to communicate with staff on the unit, able to contract for safety on the unit, taking medications as ordered on the unit  Based on today's assessment, Alberto presents the following risk of harm to self: minimal    Risk of Harm to Others:  Current Specific Risk Factors include: none  Protective Factors: no current homicidal ideation  Based on today's assessment, Alberto presents the following risk of harm to others: minimal    The following interventions are recommended: behavioral checks every 7 minutes, continued hospitalization on locked unit    Progress Toward Goals: continues to improve    Assessment/Plan   Principal Problem:    Schizoaffective disorder, bipolar type (HCC)  Active Problems:    GERD (gastroesophageal reflux disease)    Medical clearance for psychiatric admission    Tobacco abuse    T wave inversion in EKG    Chronic idiopathic constipation    Vitamin B 12 deficiency    Vitamin D deficiency      Recommended Treatment:     Planned medication and treatment  changes:    All current active medications have been reviewed  Encourage group therapy, milieu therapy and occupational therapy  Behavioral Health checks every 7 minutes  Increase lithium  mg in the morning and 1200 mg at bedtime  Recheck Lithium level and BMP  on Saturday    Continue all other psychiatric medications the same  Current Facility-Administered Medications   Medication Dose Route Frequency Provider Last Rate    acetaminophen  650 mg Oral Q6H PRN Yasmin Harvey MD      acetaminophen  650 mg Oral Q4H PRN Yasmin Harvey MD      acetaminophen  975 mg Oral Q6H PRN Yasmin Harvey MD      aluminum-magnesium hydroxide-simethicone  30 mL Oral Q4H PRN Yasmin Harvey MD      haloperidol lactate  2.5 mg Intramuscular Q4H PRN Max 4/day Yasmin Harvey MD      And    LORazepam  1 mg Intramuscular Q4H PRN Max 4/day Yasmin Harvey MD      And    benztropine  0.5 mg Intramuscular Q4H PRN Max 4/day Yasmin Harvey MD      haloperidol lactate  5 mg Intramuscular Q4H PRN Max 4/day Yasmin Harvey MD      And    LORazepam  2 mg Intramuscular Q4H PRN Max 4/day Yasmin Harvey MD      And    benztropine  1 mg Intramuscular Q4H PRN Max 4/day Yasmin Harvey MD      benztropine  0.5 mg Oral BID La Flores MD      benztropine  1 mg Oral Q4H PRN Max 6/day Yasmin Harvey MD      bisacodyl  10 mg Rectal Daily PRN Yasmin Harvey MD      cholecalciferol  2,000 Units Oral Daily HOLLI Monge      cyanocobalamin  1,000 mcg Oral Daily HOLLI Monge      hydrOXYzine HCL  50 mg Oral Q6H PRN Max 4/day Yasmin Harvey MD      Or    diphenhydrAMINE  50 mg Intramuscular Q6H PRN Yasmin Harvey MD      escitalopram  15 mg Oral Daily La Flores MD      haloperidol  1 mg Oral Q6H PRN Yasmin Harvey MD      haloperidol  15 mg Oral QPM La Flores MD      haloperidol  15 mg Oral Daily HOLLI Anderson      haloperidol  2.5 mg Oral Q4H PRN Max 4/day  Yasmin Harvey MD      haloperidol  5 mg Oral Q4H PRN Max 4/day Yasmin Harvey MD      hydrOXYzine HCL  100 mg Oral Q6H PRN Max 4/day Yasmin Harvey MD      Or    LORazepam  2 mg Intramuscular Q6H PRN Yasmin Harvey MD      hydrOXYzine HCL  25 mg Oral Q6H PRN Max 4/day Yasmin Harvey MD      lithium carbonate  1,200 mg Oral HS HOLLI Anderson      lithium carbonate  300 mg Oral Daily HOLLI Anderson      melatonin  3 mg Oral HS Yasmin Harvey MD      mirtazapine  45 mg Oral HS La Flores MD      nicotine polacrilex  4 mg Oral Q2H PRN Yasmin Harvey MD      OLANZapine  20 mg Oral HS La Flores MD      polyethylene glycol  17 g Oral Daily PRN Yasmin Harvey MD      polyethylene glycol  17 g Oral Daily HOLLI Monge      senna-docusate sodium  1 tablet Oral Daily PRN Yasmin Harvey MD      traZODone  50 mg Oral HS PRN Yasmin Harvey MD       Risks / Benefits of Treatment:    Risks, benefits, and possible side effects of medications explained to patient and patient verbalizes understanding and agreement for treatment.  The patient has a history of at least 3 antipsychotic medication trials and at this time requires treatment with 2 antipsychotic agents (Zyprexa and Haldol) due to failed multiple trials of monotherapy.    Counseling / Coordination of Care:    Patient's progress discussed with staff in treatment team meeting.  Medications, treatment progress and treatment plan reviewed with patient.    HOLLI Anderson 12/06/23

## 2023-12-06 NOTE — NURSING NOTE
"PT observed visible in the unit reading his bible while interacting with other peers. Lab work done for Lithium level scheduled today at 2000. PT denies SI/HI/VH, but continues report AH of voices telling him \"will kill him and his family\".  Compliant with HS medication, no unmet and PRN needs during this shift.   "

## 2023-12-06 NOTE — PLAN OF CARE
Problem: PSYCHOSIS  Goal: Will report no hallucinations or delusions  Description: Interventions:  - Administer medication as  ordered  - Every waking shifts and PRN assess for the presence of hallucinations and or delusions  - Assist with reality testing to support increasing orientation  - Assess if patient's hallucinations or delusions are encouraging self-harm or harm to others and intervene as appropriate  Outcome: Progressing     Problem: ANXIETY  Goal: Will report anxiety at manageable levels  Description: INTERVENTIONS:  - Administer medication as ordered  - Teach and encourage coping skills  - Provide emotional support  - Assess patient/family for anxiety and ability to cope  Outcome: Progressing     Problem: Ineffective Coping  Goal: Participates in unit activities  Description: Interventions:  - Provide therapeutic environment   - Provide required programming   - Redirect inappropriate behaviors   Outcome: Progressing     Problem: Depression  Goal: Verbalize thoughts and feelings  Description: Interventions:  - Assess and re-assess patient's level of risk   - Engage patient in 1:1 interactions, daily, for a minimum of 15 minutes   - Encourage patient to express feelings, fears, frustrations, hopes   Outcome: Progressing

## 2023-12-06 NOTE — PROGRESS NOTES
12/06/23 0835   Team Meeting   Meeting Type Daily Rounds   Team Members Present   Team Members Present Physician;Nurse;   Physician Team Member Mark / Vin / Deysi / Christo   Nursing Team Member Gary / Giancarlo   Care Management Team Member Rabia   Patient/Family Present   Patient Present No   Patient's Family Present No     Pt reports AH -telling him that he and his family going to die. Visible and social on the unit. Compliant with meds and meals. Meds - add Lithium 300mg daily and continue 1200 at bed time, recheck lvl on 12/9. Continue Haldol, Remeron and melatonin. Continue to monitor. Discharge to be determined.

## 2023-12-07 PROCEDURE — 99232 SBSQ HOSP IP/OBS MODERATE 35: CPT | Performed by: PSYCHIATRY & NEUROLOGY

## 2023-12-07 RX ADMIN — MIRTAZAPINE 45 MG: 30 TABLET, FILM COATED ORAL at 21:26

## 2023-12-07 RX ADMIN — CHOLECALCIFEROL TAB 25 MCG (1000 UNIT) 2000 UNITS: 25 TAB at 08:18

## 2023-12-07 RX ADMIN — ESCITSLOPRAM 15 MG: 5 TABLET ORAL at 08:18

## 2023-12-07 RX ADMIN — LITHIUM CARBONATE 300 MG: 300 TABLET, EXTENDED RELEASE ORAL at 08:18

## 2023-12-07 RX ADMIN — HALOPERIDOL 15 MG: 5 TABLET ORAL at 08:18

## 2023-12-07 RX ADMIN — OLANZAPINE 20 MG: 10 TABLET, FILM COATED ORAL at 21:26

## 2023-12-07 RX ADMIN — CYANOCOBALAMIN TAB 1000 MCG 1000 MCG: 1000 TAB at 08:18

## 2023-12-07 RX ADMIN — MELATONIN TAB 3 MG 3 MG: 3 TAB at 21:26

## 2023-12-07 RX ADMIN — POLYETHYLENE GLYCOL 3350 17 G: 17 POWDER, FOR SOLUTION ORAL at 08:19

## 2023-12-07 RX ADMIN — LITHIUM CARBONATE 1200 MG: 450 TABLET, EXTENDED RELEASE ORAL at 21:26

## 2023-12-07 RX ADMIN — BENZTROPINE MESYLATE 0.5 MG: 0.5 TABLET ORAL at 08:18

## 2023-12-07 RX ADMIN — HALOPERIDOL 15 MG: 5 TABLET ORAL at 17:31

## 2023-12-07 RX ADMIN — BENZTROPINE MESYLATE 0.5 MG: 0.5 TABLET ORAL at 17:31

## 2023-12-07 NOTE — PROGRESS NOTES
12/07/23 1042   Team Meeting   Meeting Type Daily Rounds   Team Members Present   Team Members Present Physician;Nurse;   Physician Team Member Mark   Nursing Team Member Giancarlo   Care Management Team Member Miguelito Camarillo   Patient/Family Present   Patient Present No   Patient's Family Present No     Patient currently holds 201 status. Patient has been compliant with medications and meals. Patient is isolative to room and denies all symptoms. Patient to continue on Lexapro 15 mg, haldol 15 mg BID, Lithium 1200 mg HS, lithium 300 mg daily, Remeron 45 mg HS, Zyprexa 20 mg HS and melatonin 3 mg HS. Patient discharge date and time to be determined.

## 2023-12-07 NOTE — NURSING NOTE
Patient appropriate on approach, denies SI/HI, reports auditory hallucinations through out the day but denies any symptoms at this time. Patient visible socializing with peers compliant with medication and unit routine.

## 2023-12-07 NOTE — PROGRESS NOTES
Progress Note - Behavioral Health     Alberto Berumen 28 y.o. male MRN: [id number]   Unit/Bed#: [facility] 383-02 Encounter: [id number]    Documentation, nursing notes, medication reconciliation, labs, and vitals reviewed. Patient was seen for continuing care and reviewed with treatment team. No acute events over the past 24 hours.  Per nursing report, has been compliant with medications.  Still isolative to his room, especially in the morning.  Still complains of auditory hallucinations.    Medication changes over the past 24 hours: Lithium was increased to 300 mg in the morning.  Will recheck lithium level on Saturday. Continues to tolerate current medications with no adverse effects.     On evaluation today, he continues to show slow improvements.  He does tell me hallucinations are better in the evening.  Continue to be problematic in the mornings.  He is also poorly motivated in the morning and struggles to come out of bed for breakfast.  Overall calm and pleasant and cooperative.  No suicidal ideation, plan, or intent, and does not exhibit any symptoms of aimee on evaluation.    Psychiatric ROS:  Behavior over the last 24 hours: limited improvement  Sleep: slept off and on  Appetite: fair  Medication side effects: No   ROS: no complaints, all other systems are negative    Mental Status Evaluation:    Appearance:  age appropriate   Behavior:  pleasant, cooperative   Speech:  normal rate, normal volume   Mood:  dysphoric   Affect:  blunted   Thought Process:  goal directed   Associations: concrete associations   Thought Content:  some paranoia   Perceptual Disturbances: auditory hallucinations still present, but less frequent   Risk Potential: Suicidal ideation - None  Homicidal ideation - None  Potential for aggression - No   Sensorium:  oriented to person, place, time/date, and situation   Memory:  recent and remote memory grossly intact   Consciousness:  alert and awake   Attention/Concentration: attention span and  concentration appear shorter than expected for age   Insight:  limited   Judgment: limited   Gait/Station: normal gait/station, normal balance   Motor Activity: no abnormal movements     Vital signs in last 24 hours:    Temp:  [97.6 °F (36.4 °C)-97.9 °F (36.6 °C)] 97.6 °F (36.4 °C)  HR:  [96-99] 96  Resp:  [16-18] 16  BP: (149-153)/(77-83) 149/77    Laboratory results: I have personally reviewed all pertinent laboratory/tests results    Results from the past 24 hours: No results found for this or any previous visit (from the past 24 hour(s)).    Suicide/Homicide Risk Assessment:    Risk of Harm to Self:   Current Specific Risk Factors include: mental illness diagnosis, current psychotic symptoms  Protective Factors: no current suicidal ideation, ability to communicate with staff on the unit, able to contract for safety on the unit, taking medications as ordered on the unit  Based on today's assessment, Alberto presents the following risk of harm to self: minimal    Risk of Harm to Others:  Current Specific Risk Factors include: current psychotic symptoms, poor insight  Protective Factors: no current homicidal ideation, able to communicate with staff on the unit, improved impulse control, psychotic symptoms are less prominent  Based on today's assessment, Alberto presents the following risk of harm to others: minimal    The following interventions are recommended: behavioral checks every 7 minutes, continued hospitalization on locked unit    Progress Toward Goals: progressing slowly    Assessment/Plan   Principal Problem:    Schizoaffective disorder, bipolar type (HCC)  Active Problems:    GERD (gastroesophageal reflux disease)    Medical clearance for psychiatric admission    Tobacco abuse    T wave inversion in EKG    Chronic idiopathic constipation    Vitamin B 12 deficiency    Vitamin D deficiency      Recommended Treatment:     Planned medication and treatment changes:    All current active medications have been  reviewed  Encourage group therapy, milieu therapy and occupational therapy  Behavioral Health checks every 7 minutes  Recheck Lithium level in 2 days  Continue all other medications:    Current Facility-Administered Medications   Medication Dose Route Frequency Provider Last Rate    acetaminophen  650 mg Oral Q6H PRN Yasmin Harvey MD      acetaminophen  650 mg Oral Q4H PRN Yasmin Harvey MD      acetaminophen  975 mg Oral Q6H PRN Yasmin Harvey MD      aluminum-magnesium hydroxide-simethicone  30 mL Oral Q4H PRN Yasmin Harvey MD      haloperidol lactate  2.5 mg Intramuscular Q4H PRN Max 4/day Yasmin Harvey MD      And    LORazepam  1 mg Intramuscular Q4H PRN Max 4/day Yasmin Harvey MD      And    benztropine  0.5 mg Intramuscular Q4H PRN Max 4/day Yasmin Harvey MD      haloperidol lactate  5 mg Intramuscular Q4H PRN Max 4/day Yasmin Harvey MD      And    LORazepam  2 mg Intramuscular Q4H PRN Max 4/day Yasmin Harvey MD      And    benztropine  1 mg Intramuscular Q4H PRN Max 4/day Yasmin Harvey MD      benztropine  0.5 mg Oral BID La Flores MD      benztropine  1 mg Oral Q4H PRN Max 6/day Yasmin Harvey MD      bisacodyl  10 mg Rectal Daily PRN Yasmin Harvey MD      cholecalciferol  2,000 Units Oral Daily HOLLI Monge      cyanocobalamin  1,000 mcg Oral Daily HOLLI Monge      hydrOXYzine HCL  50 mg Oral Q6H PRN Max 4/day Yasmin Harvey MD      Or    diphenhydrAMINE  50 mg Intramuscular Q6H PRN Yasmin Harvey MD      escitalopram  15 mg Oral Daily La Flores MD      haloperidol  1 mg Oral Q6H PRN Ysamin Harvey MD      haloperidol  15 mg Oral QPM La Flores MD      haloperidol  15 mg Oral Daily HOLLI Anderson      haloperidol  2.5 mg Oral Q4H PRN Max 4/day Yasmin Harvey MD      haloperidol  5 mg Oral Q4H PRN Max 4/day Yasmin Harvey MD      hydrOXYzine HCL  100 mg Oral Q6H PRN Max 4/day Yasmin JACKMAN  MD Candice      Or    LORazepam  2 mg Intramuscular Q6H PRN Yasmin Harvey MD      hydrOXYzine HCL  25 mg Oral Q6H PRN Max 4/day Yasmin Harvey MD      lithium carbonate  1,200 mg Oral HS HOLLI Anderson      lithium carbonate  300 mg Oral Daily HOLLI Anderson      melatonin  3 mg Oral HS Yasmin Harvey MD      mirtazapine  45 mg Oral HS La Flores MD      nicotine polacrilex  4 mg Oral Q2H PRN Yasmin Harvey MD      OLANZapine  20 mg Oral HS La Flores MD      polyethylene glycol  17 g Oral Daily PRN Yasmin Harvey MD      polyethylene glycol  17 g Oral Daily HOLLI Monge      senna-docusate sodium  1 tablet Oral Daily PRN Yasmin Harvey MD      traZODone  50 mg Oral HS PRN Yasmin Harvey MD       Risks / Benefits of Treatment:    Risks, benefits, and possible side effects of medications explained to patient and patient verbalizes understanding and agreement for treatment.  The patient has a history of at least 3 antipsychotic medication trials and at this time requires treatment with 2 antipsychotic agents (Zyprexa and Haldol) due to failed multiple trials of monotherapy.    Counseling / Coordination of Care:    Patient's progress discussed with staff in treatment team meeting.  Medications, treatment progress and treatment plan reviewed with patient.    HOLLI Anderson 12/07/23

## 2023-12-07 NOTE — CASE MANAGEMENT
Writer discussed insurance issue with patients  Wanda. Wanda agreed to attempt to get him benefits when he is released from the hospital.

## 2023-12-07 NOTE — CASE MANAGEMENT
Writer checked in with patient. Writer explained that she touched base with Wanda PRINCE and requested she assist him with obtaining insurance when he is released from hospital. Patient understood and explained he would work on obtaining insurance post discharge.

## 2023-12-08 LAB
ATRIAL RATE: 103 BPM
ATRIAL RATE: 104 BPM
ATRIAL RATE: 106 BPM
P AXIS: 39 DEGREES
P AXIS: 39 DEGREES
P AXIS: 40 DEGREES
PR INTERVAL: 140 MS
PR INTERVAL: 142 MS
PR INTERVAL: 142 MS
QRS AXIS: 47 DEGREES
QRS AXIS: 48 DEGREES
QRS AXIS: 52 DEGREES
QRSD INTERVAL: 80 MS
QRSD INTERVAL: 84 MS
QRSD INTERVAL: 86 MS
QT INTERVAL: 322 MS
QT INTERVAL: 328 MS
QT INTERVAL: 330 MS
QTC INTERVAL: 423 MS
QTC INTERVAL: 432 MS
QTC INTERVAL: 435 MS
T WAVE AXIS: -13 DEGREES
T WAVE AXIS: -22 DEGREES
T WAVE AXIS: -30 DEGREES
VENTRICULAR RATE: 103 BPM
VENTRICULAR RATE: 104 BPM
VENTRICULAR RATE: 106 BPM

## 2023-12-08 PROCEDURE — 93010 ELECTROCARDIOGRAM REPORT: CPT | Performed by: INTERNAL MEDICINE

## 2023-12-08 PROCEDURE — 99232 SBSQ HOSP IP/OBS MODERATE 35: CPT | Performed by: NURSE PRACTITIONER

## 2023-12-08 PROCEDURE — 99232 SBSQ HOSP IP/OBS MODERATE 35: CPT | Performed by: PSYCHIATRY & NEUROLOGY

## 2023-12-08 PROCEDURE — 93005 ELECTROCARDIOGRAM TRACING: CPT

## 2023-12-08 RX ADMIN — MIRTAZAPINE 45 MG: 30 TABLET, FILM COATED ORAL at 21:18

## 2023-12-08 RX ADMIN — HALOPERIDOL 15 MG: 5 TABLET ORAL at 08:15

## 2023-12-08 RX ADMIN — POLYETHYLENE GLYCOL 3350 17 G: 17 POWDER, FOR SOLUTION ORAL at 08:16

## 2023-12-08 RX ADMIN — MELATONIN TAB 3 MG 3 MG: 3 TAB at 21:18

## 2023-12-08 RX ADMIN — CHOLECALCIFEROL TAB 25 MCG (1000 UNIT) 2000 UNITS: 25 TAB at 08:14

## 2023-12-08 RX ADMIN — CYANOCOBALAMIN TAB 1000 MCG 1000 MCG: 1000 TAB at 08:15

## 2023-12-08 RX ADMIN — OLANZAPINE 20 MG: 10 TABLET, FILM COATED ORAL at 21:18

## 2023-12-08 RX ADMIN — BENZTROPINE MESYLATE 0.5 MG: 0.5 TABLET ORAL at 17:09

## 2023-12-08 RX ADMIN — LITHIUM CARBONATE 1200 MG: 450 TABLET, EXTENDED RELEASE ORAL at 21:18

## 2023-12-08 RX ADMIN — ESCITSLOPRAM 15 MG: 5 TABLET ORAL at 08:15

## 2023-12-08 RX ADMIN — BENZTROPINE MESYLATE 0.5 MG: 0.5 TABLET ORAL at 08:15

## 2023-12-08 RX ADMIN — LITHIUM CARBONATE 300 MG: 300 TABLET, EXTENDED RELEASE ORAL at 08:15

## 2023-12-08 RX ADMIN — HALOPERIDOL 15 MG: 5 TABLET ORAL at 17:08

## 2023-12-08 RX ADMIN — TRAZODONE HYDROCHLORIDE 50 MG: 50 TABLET ORAL at 21:20

## 2023-12-08 NOTE — PROGRESS NOTES
12/08/23 3182   Team Meeting   Meeting Type Daily Rounds   Team Members Present   Team Members Present Physician;Nurse;   Physician Team Member Mark   Nursing Team Member Giancarlo   Care Management Team Member Miguelito Camarillo   Patient/Family Present   Patient Present No   Patient's Family Present No     Patient currently holds 201 status. Patient denies all symptoms. Patient is compliant with medications and meals. Patient may be taken off lithium as providers are considering ECT treatments. Patient discharge date and plan to be determined.

## 2023-12-08 NOTE — PROGRESS NOTES
[facility]  Progress Note  Name: Alberto Berumen I  MRN: [id number]  Unit/Bed#: -02 I Date of Admission: 11/14/2023   Date of Service: 12/8/2023 I Hospital Day: 24    Assessment/Plan   * Schizoaffective disorder, bipolar type (HCC)  Assessment & Plan  Per psych they would like ECT clearance  This is an ECT clearance note         ECT Clearance:  History of recent seizure or stroke:  pt denies  History of pheochromocytoma: pt denies  History of active bleeding (Intracranial hemorrhage, aneurysm or AVM):  pt denies  History of metallic implants in the head or neck: pt denies  History of increased intracranial pressure with mass effect:  pt denies2    EKG within 3 months yes  If yes, was an arrhythmia present and at baseline No  If yes, what is the baseline QT interval None  If no, obtain prior to ECT for arrhythmia evaluation and baseline QT interval. 328  Twelve-lead EKG on 12/8/2023 reviewed by myself as well as interpreted by myself ventricular rate 106 QT interval 328 QTc 435 sinus tachycardia with ST and T wave abnormalities consider inferior lateral ischemia abnormal EKG when compared with an EKG from 11/15/2023 there are no new acute changes these changes were all of their since then I have discussed this case with cardiology and they feel these are changes from many years ago and he has had a recent CT angio and has no cardiac abnormalities    Based on above criteria, Patient is medically cleared for ECT should it be recommended.      Nurse Coordination of Care Discussion: 20 mins    Discussions with Specialists or Other Care Team Provider: Nathan jacobo with Dr. Salvador Gutierrez regarding case per a discussion with him..  No formal consult needed patient had a CT angio on 3/9/2022 and he feels that his EKG changes are old and he does not see any contraindication to him having ECT therapy. 20 mins    Education and Discussions with  Patient: 15    Time Spent for Care: 45 minutes.   More than 50% of total time spent on counseling and coordination of care as described above.    Current Length of Stay: 24 day(s)    Code Status: Level 1 - Full Code      Subjective:   I understand what has been told to me regarding the ECT therapy    Objective:     Vitals:   Temp (24hrs), Av.6 °F (36.4 °C), Min:97.6 °F (36.4 °C), Max:97.6 °F (36.4 °C)    Temp:  [97.6 °F (36.4 °C)] 97.6 °F (36.4 °C)  HR:  [94] 94  Resp:  [16] 16  BP: (153)/(101) 153/101  SpO2:  [99 %] 99 %  Body mass index is 36.52 kg/m².       Physical Exam:     Physical Exam  HENT:      Head: Normocephalic and atraumatic.      Nose: Nose normal.      Mouth/Throat:      Mouth: Mucous membranes are moist.   Eyes:      Extraocular Movements: Extraocular movements intact.   Cardiovascular:      Rate and Rhythm: Normal rate and regular rhythm.   Pulmonary:      Effort: Pulmonary effort is normal.      Breath sounds: Normal breath sounds.   Abdominal:      General: Abdomen is flat. Bowel sounds are normal.   Musculoskeletal:         General: Normal range of motion.      Cervical back: Normal range of motion.   Skin:     General: Skin is warm and dry.   Neurological:      Mental Status: He is alert.           Additional Data:     Labs:                            * I Have Reviewed All Lab Data Listed Above.  * Additional Pertinent Lab Tests Reviewed: All Labs For Current Hospital Admission Reviewed    Imaging:    Imaging Reports Reviewed Today Include: EKG 2023  CT angiogram of the chest with contrast on 3/9/2022        Last 24 Hours Medication List:   Current Facility-Administered Medications   Medication Dose Route Frequency Provider Last Rate    acetaminophen  650 mg Oral Q6H PRN Yasmin Harvey MD      acetaminophen  650 mg Oral Q4H PRN Yasmin Harvey MD      acetaminophen  975 mg Oral Q6H PRN Yasmin Harvey MD      aluminum-magnesium hydroxide-simethicone  30 mL Oral Q4H PRN Yasmin Harvey MD      haloperidol lactate  2.5 mg  Intramuscular Q4H PRN Max 4/day Yasmin Harvey MD      And    LORazepam  1 mg Intramuscular Q4H PRN Max 4/day Yasmin Harvey MD      And    benztropine  0.5 mg Intramuscular Q4H PRN Max 4/day Yasmin Harvey MD      haloperidol lactate  5 mg Intramuscular Q4H PRN Max 4/day Yasmin Harvey MD      And    LORazepam  2 mg Intramuscular Q4H PRN Max 4/day Yasmin Harvey MD      And    benztropine  1 mg Intramuscular Q4H PRN Max 4/day Yasmin Harvey MD      benztropine  0.5 mg Oral BID La Flores MD      benztropine  1 mg Oral Q4H PRN Max 6/day Yasmin Harvey MD      bisacodyl  10 mg Rectal Daily PRN Yasmin Harvey MD      cholecalciferol  2,000 Units Oral Daily HOLLI Monge      cyanocobalamin  1,000 mcg Oral Daily HOLLI Monge      hydrOXYzine HCL  50 mg Oral Q6H PRN Max 4/day Yasmin Harvey MD      Or    diphenhydrAMINE  50 mg Intramuscular Q6H PRN Yasmin Harvey MD      escitalopram  15 mg Oral Daily La Flores MD      haloperidol  1 mg Oral Q6H PRN Yasmin Harvey MD      haloperidol  15 mg Oral QPM La Flores MD      haloperidol  15 mg Oral Daily HOLLI Anderson      haloperidol  2.5 mg Oral Q4H PRN Max 4/day Yasmin Harvey MD      haloperidol  5 mg Oral Q4H PRN Max 4/day Yasmin Harvey MD      hydrOXYzine HCL  100 mg Oral Q6H PRN Max 4/day Yasmin Harvey MD      Or    LORazepam  2 mg Intramuscular Q6H PRN Yasmin Harvey MD      hydrOXYzine HCL  25 mg Oral Q6H PRN Max 4/day Yasmin Harvey MD      lithium carbonate  1,200 mg Oral HS HOLLI Anderson      lithium carbonate  300 mg Oral Daily HOLLI Anderson      melatonin  3 mg Oral HS Yasmin Harvey MD      mirtazapine  45 mg Oral HS La Flores MD      nicotine polacrilex  4 mg Oral Q2H PRN Yasmin Harvey MD      OLANZapine  20 mg Oral HS La Flores MD      polyethylene glycol  17 g Oral Daily PRN Yasmin Harvey MD      polyethylene  glycol  17 g Oral Daily HOLLI Monge      senna-docusate sodium  1 tablet Oral Daily PRN Yasmin Harvey MD      traZODone  50 mg Oral HS PRN Yasmin Harvey MD          Today, Patient Was Seen By: HOLLI Galvan      ** Please Note: Dictation voice to text software may have been used in the creation of this document. **    I have spent a total time of 45 minutes on 12/08/23 in caring for this patient including Diagnostic results, Prognosis, Risks and benefits of tx options, Instructions for management, Patient and family education, Importance of tx compliance, Risk factor reductions, Impressions, Counseling / Coordination of care, Documenting in the medical record, Reviewing / ordering tests, medicine, procedures  , Obtaining or reviewing history  , and Communicating with other healthcare professionals .

## 2023-12-08 NOTE — NURSING NOTE
"Pt awake and alert, visible on the unit and appropriate with staff and peers. Pt is calm and cooperative, brightens on approach. Attending groups. Denies SI/HI/VH, continues to report AH but states \"they aren't as bad\" today. Compliant with scheduled medications and meals, appetite good. Denies any unmet needs at this time. Q7 minute safely checks maintained.  "

## 2023-12-08 NOTE — PROGRESS NOTES
"Progress Note - Behavioral Health     Alberto Berumen 28 y.o. male MRN: [id number]   Unit/Bed#: [facility] 383-02 Encounter: [id number]    Documentation, nursing notes, medication reconciliation, labs, and vitals reviewed. Patient was seen for continuing care and reviewed with treatment team. No acute events over the past 24 hours.  Per nursing report, Continues isolative to his room.  Continues to complain of auditory hallucinations telling him \"they are going to kill my family \".  Some decrease in hallucinations and able to be more social in the evening.    Medication changes over the past 24 hours. Continues to tolerate current medications with no adverse effects. On evaluation today, he does acknowledge some improvements in the evening.  He is again seen in his room in bed with the covers over his head.  Continues to endorse auditory hallucinations making derogatory comments.  Admits he feels \"stuck \".  With only minimal improvement with medication changes.  Hallucinations continue to be distressful in the mornings.  Mood is low and low motivation.  We discussed the option of ECT for his treatment resistant symptoms.  He asked appropriate questions about the treatment, I provided some education.  He is willing to pursue this option further.  No suicidal ideation, plan, or intent, and does not exhibit any symptoms of aimee on evaluation.    Psychiatric ROS:  Behavior over the last 24 hours: unchanged  Sleep: slept off and on  Appetite: fair  Medication side effects: No   ROS: no complaints, all other systems are negative    Mental Status Evaluation:    Appearance:  marginal hygiene   Behavior:  cooperative, guarded   Speech:  slow   Mood:  depressed   Affect:  flat   Thought Process:  goal directed   Associations: intact associations   Thought Content:  no overt delusions   Perceptual Disturbances: none   Risk Potential: Suicidal ideation - None  Homicidal ideation - None  Potential for aggression - No   Sensorium:  " oriented to person, place, and time/date   Memory:  recent and remote memory grossly intact   Consciousness:  alert and awake   Attention/Concentration: decreased concentration and decreased attention span   Insight:  limited   Judgment: limited   Gait/Station: normal gait/station, normal balance   Motor Activity: no abnormal movements     Vital signs in last 24 hours:    Temp:  [97.6 °F (36.4 °C)-97.8 °F (36.6 °C)] 97.6 °F (36.4 °C)  HR:  [] 94  Resp:  [16] 16  BP: (153)/() 153/101    Laboratory results: I have personally reviewed all pertinent laboratory/tests results    Results from the past 24 hours: No results found for this or any previous visit (from the past 24 hour(s)).    Suicide/Homicide Risk Assessment:    Risk of Harm to Self:   Current Specific Risk Factors include: mental illness diagnosis, current psychotic symptoms  Protective Factors: no current suicidal ideation, ability to communicate with staff on the unit, able to contract for safety on the unit, taking medications as ordered on the unit  Based on today's assessment, Alberto presents the following risk of harm to self: minimal    Risk of Harm to Others:  Current Specific Risk Factors include: current psychotic symptoms  Protective Factors: no current homicidal ideation, able to communicate with staff on the unit, compliant with medications on the unit as ordered  Based on today's assessment, Alberto presents the following risk of harm to others: minimal    The following interventions are recommended: behavioral checks every 7 minutes, continued hospitalization on locked unit    Progress Toward Goals: no significant improvement    Assessment/Plan   Principal Problem:    Schizoaffective disorder, bipolar type (HCC)  Active Problems:    GERD (gastroesophageal reflux disease)    Medical clearance for psychiatric admission    Tobacco abuse    T wave inversion in EKG    Chronic idiopathic constipation    Vitamin B 12 deficiency    Vitamin  D deficiency      Recommended Treatment:     Planned medication and treatment changes:    All current active medications have been reviewed  Encourage group therapy, milieu therapy and occupational therapy  Behavioral Health checks every 7 minutes  We will reorder EKG and ask [facility] internal medicine to assess patient for medical clearance for ECT    He is for lithium level on Saturday    Continue all her medications the same    Current Facility-Administered Medications   Medication Dose Route Frequency Provider Last Rate    acetaminophen  650 mg Oral Q6H PRN Yasmin Harvey MD      acetaminophen  650 mg Oral Q4H PRN Yasmin Harvey MD      acetaminophen  975 mg Oral Q6H PRN Yasmin Harvey MD      aluminum-magnesium hydroxide-simethicone  30 mL Oral Q4H PRN Yasmin Harvey MD      haloperidol lactate  2.5 mg Intramuscular Q4H PRN Max 4/day Yasmin Harvey MD      And    LORazepam  1 mg Intramuscular Q4H PRN Max 4/day Yasmin Harvey MD      And    benztropine  0.5 mg Intramuscular Q4H PRN Max 4/day Yasmin Harvey MD      haloperidol lactate  5 mg Intramuscular Q4H PRN Max 4/day Yasmin Harvey MD      And    LORazepam  2 mg Intramuscular Q4H PRN Max 4/day Yasmin Harvey MD      And    benztropine  1 mg Intramuscular Q4H PRN Max 4/day Yasmin Harvey MD      benztropine  0.5 mg Oral BID La Flores MD      benztropine  1 mg Oral Q4H PRN Max 6/day Yasmin Harvey MD      bisacodyl  10 mg Rectal Daily PRN Yasmin Harvey MD      cholecalciferol  2,000 Units Oral Daily HOLLI Monge      cyanocobalamin  1,000 mcg Oral Daily HOLLI Monge      hydrOXYzine HCL  50 mg Oral Q6H PRN Max 4/day Yasmin Harvey MD      Or    diphenhydrAMINE  50 mg Intramuscular Q6H PRN Yasmin Harvey MD      escitalopram  15 mg Oral Daily La Flores MD      haloperidol  1 mg Oral Q6H PRN Yasmin Harvey MD      haloperidol  15 mg Oral QPM La Flores MD       haloperidol  15 mg Oral Daily HOLLI Anderson      haloperidol  2.5 mg Oral Q4H PRN Max 4/day Yasmin Harvey MD      haloperidol  5 mg Oral Q4H PRN Max 4/day Yasmin Harvey MD      hydrOXYzine HCL  100 mg Oral Q6H PRN Max 4/day Yasmin Harvey MD      Or    LORazepam  2 mg Intramuscular Q6H PRN Yasmin Harvey MD      hydrOXYzine HCL  25 mg Oral Q6H PRN Max 4/day Yasmin Harvey MD      lithium carbonate  1,200 mg Oral HS HOLLI Anderson      lithium carbonate  300 mg Oral Daily HOLLI Anderson      melatonin  3 mg Oral HS Yasmin Harvey MD      mirtazapine  45 mg Oral HS La Flores MD      nicotine polacrilex  4 mg Oral Q2H PRN Yasmin Harvey MD      OLANZapine  20 mg Oral HS La Flores MD      polyethylene glycol  17 g Oral Daily PRN Yasmin Harvey MD      polyethylene glycol  17 g Oral Daily HOLLI Monge      senna-docusate sodium  1 tablet Oral Daily PRN Yasmin Harvey MD      traZODone  50 mg Oral HS PRN Yasmin Harvey MD       Risks / Benefits of Treatment:    Risks, benefits, and possible side effects of medications explained to patient and patient verbalizes understanding and agreement for treatment.  Discussed risks and benefits of Electroconvulsive Treatment with patient including risks of anesthesia and memory loss. Alberto verbalized understanding and agreed for ECT.    Counseling / Coordination of Care:    Patient's progress discussed with staff in treatment team meeting.  Medications, treatment progress and treatment plan reviewed with patient.    HOLLI Anderson 12/08/23

## 2023-12-08 NOTE — NURSING NOTE
Patient is in the community and social with peers. Patient brighter on approach, calm and cooperative. Patient participates in unit activities. Patient denies SI/HI/VH. Patient endorses  to be quieter this evening. Patient is compliant with scheduled medications. Staff maintained continuous rounding for safety and support.

## 2023-12-09 LAB — LITHIUM SERPL-SCNC: 0.8 MMOL/L (ref 0.6–1.2)

## 2023-12-09 PROCEDURE — 99232 SBSQ HOSP IP/OBS MODERATE 35: CPT | Performed by: STUDENT IN AN ORGANIZED HEALTH CARE EDUCATION/TRAINING PROGRAM

## 2023-12-09 PROCEDURE — 80048 BASIC METABOLIC PNL TOTAL CA: CPT | Performed by: NURSE PRACTITIONER

## 2023-12-09 PROCEDURE — 80178 ASSAY OF LITHIUM: CPT | Performed by: NURSE PRACTITIONER

## 2023-12-09 RX ADMIN — HALOPERIDOL 15 MG: 5 TABLET ORAL at 08:59

## 2023-12-09 RX ADMIN — CHOLECALCIFEROL TAB 25 MCG (1000 UNIT) 2000 UNITS: 25 TAB at 08:59

## 2023-12-09 RX ADMIN — LITHIUM CARBONATE 300 MG: 300 TABLET, EXTENDED RELEASE ORAL at 08:59

## 2023-12-09 RX ADMIN — MELATONIN TAB 3 MG 3 MG: 3 TAB at 21:32

## 2023-12-09 RX ADMIN — ESCITSLOPRAM 15 MG: 5 TABLET ORAL at 08:59

## 2023-12-09 RX ADMIN — HALOPERIDOL 15 MG: 5 TABLET ORAL at 17:36

## 2023-12-09 RX ADMIN — LITHIUM CARBONATE 1200 MG: 450 TABLET, EXTENDED RELEASE ORAL at 21:32

## 2023-12-09 RX ADMIN — CYANOCOBALAMIN TAB 1000 MCG 1000 MCG: 1000 TAB at 09:00

## 2023-12-09 RX ADMIN — BENZTROPINE MESYLATE 0.5 MG: 0.5 TABLET ORAL at 17:36

## 2023-12-09 RX ADMIN — OLANZAPINE 20 MG: 10 TABLET, FILM COATED ORAL at 21:32

## 2023-12-09 RX ADMIN — MIRTAZAPINE 45 MG: 30 TABLET, FILM COATED ORAL at 21:32

## 2023-12-09 RX ADMIN — POLYETHYLENE GLYCOL 3350 17 G: 17 POWDER, FOR SOLUTION ORAL at 09:00

## 2023-12-09 RX ADMIN — TRAZODONE HYDROCHLORIDE 50 MG: 50 TABLET ORAL at 21:33

## 2023-12-09 RX ADMIN — BENZTROPINE MESYLATE 0.5 MG: 0.5 TABLET ORAL at 09:00

## 2023-12-09 NOTE — PROGRESS NOTES
"Progress Note - Behavioral Health   Alberto Berumen 28 y.o. male MRN: [id number]  Unit/Bed#: U 383-02 Encounter: [id number]    Assessment/Plan   Principal Problem:    Schizoaffective disorder, bipolar type (HCC)  Active Problems:    GERD (gastroesophageal reflux disease)    Medical clearance for psychiatric admission    Tobacco abuse    T wave inversion in EKG    Chronic idiopathic constipation    Vitamin B 12 deficiency    Vitamin D deficiency    Recommended Treatment:     Plan:   Treatment plan, treatment progress and medication changes were reviewed with nursing staff, Pharmacy Service and Case Management in Treatment Team meeting  Continue current medications.  Group, individual, milieu therapy.  Continue behavioral health checks per routine.  Medical management per SLIM.  Discharge planning.        ----------------------------------------      Subjective: Per nursing report no major concerns or issues.  Continues to endorse auditory hallucinations.  Compliant with medications and meals.    Patient states that his mood today is \"all right\".  Reports continued to experience auditory hallucinations that are derogatory in nature.  States that they will tell him negative things such as \"go to hell\" and \"we are going to kill your family\".  Otherwise states that he continues to utilize coping mechanisms such as listening to music or praying to help reduce this.  Reports that otherwise he is sleeping and eating well.  No major issues with medications.  He is considering initiation of ECT to help with his auditory hallucinations.    Behavior over the last 24 hours:  unchanged  Sleep: normal  Appetite: normal  Medication side effects: No  ROS: no complaints and all other systems are negative    Mental Status Evaluation:  Appearance:  marginal hygiene   Behavior:  pleasant, cooperative, calm   Speech:  soft   Mood:  depressed   Affect:  constricted   Thought Process:  organized, logical, coherent   Associations: intact " associations   Thought Content:  no overt delusions   Perceptual Disturbances: none   Risk Potential: Suicidal ideation - None at present  Homicidal ideation - None at present  Potential for aggression - No   Sensorium:  oriented to person, place, and time/date   Memory:  recent and remote memory grossly intact   Consciousness:  alert and awake   Attention/Concentration: attention span and concentration appear shorter than expected for age   Insight:  limited   Judgment: limited   Gait/Station: normal gait/station   Motor Activity: no abnormal movements     Medications: all current active meds have been reviewed.  Current Facility-Administered Medications   Medication Dose Route Frequency Provider Last Rate    acetaminophen  650 mg Oral Q6H PRN Yasmin Harvey MD      acetaminophen  650 mg Oral Q4H PRN Yasmin Harvey MD      acetaminophen  975 mg Oral Q6H PRN Yasmin Harvey MD      aluminum-magnesium hydroxide-simethicone  30 mL Oral Q4H PRN Yasmin Harvey MD      haloperidol lactate  2.5 mg Intramuscular Q4H PRN Max 4/day Yasmin Harvey MD      And    LORazepam  1 mg Intramuscular Q4H PRN Max 4/day Yasmin Harvey MD      And    benztropine  0.5 mg Intramuscular Q4H PRN Max 4/day Yasmin Harvey MD      haloperidol lactate  5 mg Intramuscular Q4H PRN Max 4/day Yasmin Harvey MD      And    LORazepam  2 mg Intramuscular Q4H PRN Max 4/day Yasmin Harvey MD      And    benztropine  1 mg Intramuscular Q4H PRN Max 4/day Yasmin Harvey MD      benztropine  0.5 mg Oral BID La Flores MD      benztropine  1 mg Oral Q4H PRN Max 6/day Yasmin Harvey MD      bisacodyl  10 mg Rectal Daily PRN Yasmin Harvey MD      cholecalciferol  2,000 Units Oral Daily HOLLI Monge      cyanocobalamin  1,000 mcg Oral Daily HOLLI Monge      hydrOXYzine HCL  50 mg Oral Q6H PRN Max 4/day Yasmin Harvey MD      Or    diphenhydrAMINE  50 mg Intramuscular Q6H PRN Yasmin JACKMAN  MD Candice      escitalopram  15 mg Oral Daily La Flores MD      haloperidol  1 mg Oral Q6H PRN Yasmin Harvey MD      haloperidol  15 mg Oral QPM La Flores MD      haloperidol  15 mg Oral Daily HOLLI Anderson      haloperidol  2.5 mg Oral Q4H PRN Max 4/day Yasmin Harvey MD      haloperidol  5 mg Oral Q4H PRN Max 4/day Yasmin Harvey MD      hydrOXYzine HCL  100 mg Oral Q6H PRN Max 4/day Yasmin Harvey MD      Or    LORazepam  2 mg Intramuscular Q6H PRN Yasmin Harvey MD      hydrOXYzine HCL  25 mg Oral Q6H PRN Max 4/day Yasmin Harvey MD      lithium carbonate  1,200 mg Oral HS Prisca Villafuerte, HOLLI      lithium carbonate  300 mg Oral Daily Prisca Villafuerte, HOLLI      melatonin  3 mg Oral HS Yasmin Harvey MD      mirtazapine  45 mg Oral HS La Flores MD      nicotine polacrilex  4 mg Oral Q2H PRN Yasmin Harvey MD      OLANZapine  20 mg Oral HS La Flores MD      polyethylene glycol  17 g Oral Daily PRN Yasmin Harvey MD      polyethylene glycol  17 g Oral Daily HOLLI Monge      senna-docusate sodium  1 tablet Oral Daily PRN Yasmin Harvey MD      traZODone  50 mg Oral HS PRN Yasmin Harvey MD         Labs: I have personally reviewed all pertinent laboratory/tests results  Most Recent Labs:   Lab Results   Component Value Date    WBC 10.15 11/15/2023    RBC 5.32 11/15/2023    HGB 12.8 11/15/2023    HCT 42.4 11/15/2023     11/15/2023    RDW 14.0 11/15/2023    NEUTROABS 6.25 11/15/2023    SODIUM 136 11/30/2023    K 4.2 11/30/2023    CL 99 11/30/2023    CO2 27 11/30/2023    BUN 11 11/30/2023    CREATININE 0.91 11/30/2023    GLUC 143 (H) 11/30/2023    CALCIUM 10.1 11/30/2023    AST 22 11/15/2023    ALT 40 11/15/2023    ALKPHOS 69 11/15/2023    TP 6.5 11/15/2023    ALB 4.1 11/15/2023    TBILI 0.45 11/15/2023    CHOLESTEROL 169 11/15/2023    HDL 37 (L) 11/15/2023    TRIG 117 11/15/2023    LDLCALC 109 (H) 11/15/2023    NONHDLC 132  11/15/2023    LITHIUM 0.6 12/05/2023    ABB0LUIHGLNP 1.062 11/15/2023    SYPHILISAB Non-reactive 11/18/2023    HGBA1C 4.9 11/15/2023    EAG 94 11/15/2023       Progress Toward Goals: progressing    Risks / Benefits of Treatment:    Risks, benefits, and possible side effects of medications explained to patient and patient verbalizes understanding and agreement for treatment.    Counseling / Coordination of Care:    Total floor / unit time spent today 25 minutes. Greater than 50% of total time was spent with the patient and / or family counseling and / or coordination of care. A description of counseling / coordination of care:  Patient's progress discussed with staff in treatment team meeting.  Medications, treatment progress and treatment plan reviewed with patient.  Reassurance and supportive therapy provided.  Encouraged participation in milieu and group therapy on the unit.    Bora Amaya MD 12/09/23

## 2023-12-09 NOTE — NURSING NOTE
"Pt is calm and cooperative upon approach. Visible in the milieu socializing with peers, able to make needs known. Pt continues to endorse AH, stated \"they're always still there\". Denies SI, HI, and pain. Pt requested and given PRN trazodone 50mg for insomnia at 2120, reports poor sleep the previous night. One hour later pt appears to be resting, effective. Compliant with HS medication and snack. Denies unmet needs/ concerns at this time.   "

## 2023-12-09 NOTE — NURSING NOTE
"The patient is calm with a depressed and blunted affect reporting \"a stable\" mood without anxiety or depression. Reports AH telling him he and his family are going to die. Out of room for breakfast, otherwise scant and isolative to room.   "

## 2023-12-10 LAB
ANION GAP SERPL CALCULATED.3IONS-SCNC: 10 MMOL/L
BUN SERPL-MCNC: 12 MG/DL (ref 5–25)
CALCIUM SERPL-MCNC: 10.1 MG/DL (ref 8.4–10.2)
CHLORIDE SERPL-SCNC: 95 MMOL/L (ref 96–108)
CO2 SERPL-SCNC: 27 MMOL/L (ref 21–32)
CREAT SERPL-MCNC: 1.07 MG/DL (ref 0.6–1.3)
GFR SERPL CREATININE-BSD FRML MDRD: 94 ML/MIN/1.73SQ M
GLUCOSE SERPL-MCNC: 153 MG/DL (ref 65–140)
POTASSIUM SERPL-SCNC: 4.2 MMOL/L (ref 3.5–5.3)
SODIUM SERPL-SCNC: 132 MMOL/L (ref 135–147)

## 2023-12-10 PROCEDURE — 99232 SBSQ HOSP IP/OBS MODERATE 35: CPT | Performed by: STUDENT IN AN ORGANIZED HEALTH CARE EDUCATION/TRAINING PROGRAM

## 2023-12-10 RX ADMIN — LITHIUM CARBONATE 1200 MG: 450 TABLET, EXTENDED RELEASE ORAL at 21:16

## 2023-12-10 RX ADMIN — HALOPERIDOL 15 MG: 5 TABLET ORAL at 08:32

## 2023-12-10 RX ADMIN — CYANOCOBALAMIN TAB 1000 MCG 1000 MCG: 1000 TAB at 08:32

## 2023-12-10 RX ADMIN — ESCITSLOPRAM 15 MG: 5 TABLET ORAL at 08:32

## 2023-12-10 RX ADMIN — TRAZODONE HYDROCHLORIDE 50 MG: 50 TABLET ORAL at 21:16

## 2023-12-10 RX ADMIN — BENZTROPINE MESYLATE 0.5 MG: 0.5 TABLET ORAL at 17:35

## 2023-12-10 RX ADMIN — OLANZAPINE 20 MG: 10 TABLET, FILM COATED ORAL at 21:15

## 2023-12-10 RX ADMIN — MIRTAZAPINE 45 MG: 30 TABLET, FILM COATED ORAL at 21:16

## 2023-12-10 RX ADMIN — HALOPERIDOL 15 MG: 5 TABLET ORAL at 17:35

## 2023-12-10 RX ADMIN — MELATONIN TAB 3 MG 3 MG: 3 TAB at 21:16

## 2023-12-10 RX ADMIN — POLYETHYLENE GLYCOL 3350 17 G: 17 POWDER, FOR SOLUTION ORAL at 08:32

## 2023-12-10 RX ADMIN — BENZTROPINE MESYLATE 0.5 MG: 0.5 TABLET ORAL at 08:32

## 2023-12-10 RX ADMIN — LITHIUM CARBONATE 300 MG: 300 TABLET, EXTENDED RELEASE ORAL at 08:32

## 2023-12-10 RX ADMIN — CHOLECALCIFEROL TAB 25 MCG (1000 UNIT) 2000 UNITS: 25 TAB at 08:32

## 2023-12-10 NOTE — NURSING NOTE
Pt is calm with a blunted and depressed affect reporting ongoing AH telling him he and his family are going to die. Pt out on the milieu socializing with peers during lunch. Denies anxiety, depression, or SI/HI/VH. No unmet needs at this time.

## 2023-12-10 NOTE — NURSING NOTE
Patient was visible this evening in the small tv room interacting with peers. Reports auditory hallucinations that they and their family are going to die and go to hell. Denies depression, anxiety, SI, and HI. Medication compliant. Compliant with completing their scheduled blood work this evening. Able to make needs known throughout the evening and denies any unmet needs or concerns at this time.

## 2023-12-10 NOTE — NURSING NOTE
Patient requested and received PRN trazodone 50 mg for insomnia at 2133. Upon reassessment one hour later at 2233 patient is observed resting comfortably in bed. Medication effective.

## 2023-12-10 NOTE — PROGRESS NOTES
"Progress Note - Behavioral Health   Alberto Berumen 28 y.o. male MRN: [id number]  Unit/Bed#: U 383-02 Encounter: [id number]    Assessment/Plan   Principal Problem:    Schizoaffective disorder, bipolar type (HCC)  Active Problems:    GERD (gastroesophageal reflux disease)    Medical clearance for psychiatric admission    Tobacco abuse    T wave inversion in EKG    Chronic idiopathic constipation    Vitamin B 12 deficiency    Vitamin D deficiency    Recommended Treatment:     Plan:   Treatment plan, treatment progress and medication changes were reviewed with nursing staff, Pharmacy Service and Case Management in Treatment Team meeting  Continue current medications.  Group, individual, milieu therapy.  Continue behavioral health checks per routine.  Medical management per SLIM.  Discharge planning.        ----------------------------------------      Subjective: Per nursing report patient noted to be pleasant and cooperative.  Interacting appropriately with staff and peers.  From tingling to complain of auditory hallucinations.  Compliant with lithium level yesterday which returned to 0.8 mmol/L.    On examination today he states that his mood is \"the same\".  He remains depressed and dysphoric.  States that he continues to experience auditory hallucinations.  He reports that they will make derogatory and negative comments towards him such as \"your family is going to die\".  Reports he is still distracted and bothered by these.  Reports that otherwise he is sleeping and eating well.  Trying to maintain positivity and interact in the milieu to help mitigate symptoms.  He is still open to considering ECT as an option.    Behavior over the last 24 hours:  unchanged  Sleep: normal  Appetite: normal  Medication side effects: No  ROS: no complaints and all other systems are negative    Mental Status Evaluation:  Appearance:  casually dressed, marginal hygiene   Behavior:  cooperative, agitated   Speech:  soft   Mood:  " depressed   Affect:  constricted   Thought Process:  organized, logical, coherent, goal directed   Associations: intact associations   Thought Content:  negative thinking, ruminations   Perceptual Disturbances: auditory hallucinations   Risk Potential: Suicidal ideation - None at present  Homicidal ideation - None at present  Potential for aggression - No   Sensorium:  oriented to person, place, and time/date   Memory:  recent and remote memory grossly intact   Consciousness:  alert and awake   Attention/Concentration: attention span and concentration appear shorter than expected for age   Insight:  limited   Judgment: limited   Gait/Station: normal gait/station   Motor Activity: no abnormal movements     Medications: all current active meds have been reviewed.  Current Facility-Administered Medications   Medication Dose Route Frequency Provider Last Rate    acetaminophen  650 mg Oral Q6H PRN Yasmin Harvey MD      acetaminophen  650 mg Oral Q4H PRN Yasmin Harvey MD      acetaminophen  975 mg Oral Q6H PRN Yasmin Harvey MD      aluminum-magnesium hydroxide-simethicone  30 mL Oral Q4H PRN Yasmin Harvey MD      haloperidol lactate  2.5 mg Intramuscular Q4H PRN Max 4/day Yasmin Harvey MD      And    LORazepam  1 mg Intramuscular Q4H PRN Max 4/day Yasmin Harvey MD      And    benztropine  0.5 mg Intramuscular Q4H PRN Max 4/day Yasmin Harvey MD      haloperidol lactate  5 mg Intramuscular Q4H PRN Max 4/day Yasmin Harvey MD      And    LORazepam  2 mg Intramuscular Q4H PRN Max 4/day Yasmin Harvey MD      And    benztropine  1 mg Intramuscular Q4H PRN Max 4/day Yasmin Harvey MD      benztropine  0.5 mg Oral BID La Flores MD      benztropine  1 mg Oral Q4H PRN Max 6/day Yasmin Harvey MD      bisacodyl  10 mg Rectal Daily PRN Yasmin Harvey MD      cholecalciferol  2,000 Units Oral Daily HOLLI Monge      cyanocobalamin  1,000 mcg Oral Daily Eileen Holliday  HOLLI Jensen      hydrOXYzine HCL  50 mg Oral Q6H PRN Max 4/day Yasmin Harvey MD      Or    diphenhydrAMINE  50 mg Intramuscular Q6H PRN Yasmin Harvey MD      escitalopram  15 mg Oral Daily La Flores MD      haloperidol  1 mg Oral Q6H PRN Yasmin Harvey MD      haloperidol  15 mg Oral QPM La Flores MD      haloperidol  15 mg Oral Daily HOLLI Anderson      haloperidol  2.5 mg Oral Q4H PRN Max 4/day Yasmin Harvey MD      haloperidol  5 mg Oral Q4H PRN Max 4/day Yasmin Harvey MD      hydrOXYzine HCL  100 mg Oral Q6H PRN Max 4/day Yasmin Harvey MD      Or    LORazepam  2 mg Intramuscular Q6H PRN Yasmin Harvey MD      hydrOXYzine HCL  25 mg Oral Q6H PRN Max 4/day Yasmin Harvey MD      lithium carbonate  1,200 mg Oral HS HOLLI Anderson      lithium carbonate  300 mg Oral Daily HOLLI Anderson      melatonin  3 mg Oral HS Yasmin Harvey MD      mirtazapine  45 mg Oral HS La Flores MD      nicotine polacrilex  4 mg Oral Q2H PRN Yasmin Harvey MD      OLANZapine  20 mg Oral HS La Flores MD      polyethylene glycol  17 g Oral Daily PRN Yasmin Harvey MD      polyethylene glycol  17 g Oral Daily HOLLI Monge      senna-docusate sodium  1 tablet Oral Daily PRN Yasmin Harvey MD      traZODone  50 mg Oral HS PRN Yasmin Harvey MD         Labs: I have personally reviewed all pertinent laboratory/tests results  Results from the past 24 hours:   Recent Results (from the past 24 hour(s))   Lithium level    Collection Time: 12/09/23  8:34 PM   Result Value Ref Range    Lithium Lvl 0.8 0.6 - 1.2 mmol/L   Basic metabolic panel    Collection Time: 12/09/23  8:34 PM   Result Value Ref Range    Sodium 132 (L) 135 - 147 mmol/L    Potassium 4.2 3.5 - 5.3 mmol/L    Chloride 95 (L) 96 - 108 mmol/L    CO2 27 21 - 32 mmol/L    ANION GAP 10 mmol/L    BUN 12 5 - 25 mg/dL    Creatinine 1.07 0.60 - 1.30 mg/dL    Glucose 153 (H) 65 - 140 mg/dL     Calcium 10.1 8.4 - 10.2 mg/dL    eGFR 94 ml/min/1.73sq m       Progress Toward Goals: progressing    Risks / Benefits of Treatment:    Risks, benefits, and possible side effects of medications explained to patient and patient verbalizes understanding and agreement for treatment.    Counseling / Coordination of Care:    Total floor / unit time spent today 25 minutes. Greater than 50% of total time was spent with the patient and / or family counseling and / or coordination of care. A description of counseling / coordination of care:  Patient's progress discussed with staff in treatment team meeting.  Medications, treatment progress and treatment plan reviewed with patient.  Reassurance and supportive therapy provided.  Encouraged participation in milieu and group therapy on the unit.    Bora Amaya MD 12/10/23

## 2023-12-11 PROCEDURE — 99233 SBSQ HOSP IP/OBS HIGH 50: CPT | Performed by: PSYCHIATRY & NEUROLOGY

## 2023-12-11 RX ORDER — OLANZAPINE 5 MG/1
5 TABLET ORAL DAILY
Status: DISCONTINUED | OUTPATIENT
Start: 2023-12-11 | End: 2023-12-12

## 2023-12-11 RX ORDER — HALOPERIDOL 10 MG/1
10 TABLET ORAL DAILY
Status: DISCONTINUED | OUTPATIENT
Start: 2023-12-12 | End: 2023-12-13

## 2023-12-11 RX ADMIN — HALOPERIDOL 15 MG: 5 TABLET ORAL at 17:26

## 2023-12-11 RX ADMIN — HALOPERIDOL 15 MG: 5 TABLET ORAL at 08:51

## 2023-12-11 RX ADMIN — CYANOCOBALAMIN TAB 1000 MCG 1000 MCG: 1000 TAB at 08:51

## 2023-12-11 RX ADMIN — NICOTINE POLACRILEX 4 MG: 4 GUM, CHEWING ORAL at 19:24

## 2023-12-11 RX ADMIN — POLYETHYLENE GLYCOL 3350 17 G: 17 POWDER, FOR SOLUTION ORAL at 08:51

## 2023-12-11 RX ADMIN — ESCITSLOPRAM 15 MG: 5 TABLET ORAL at 08:51

## 2023-12-11 RX ADMIN — MIRTAZAPINE 45 MG: 30 TABLET, FILM COATED ORAL at 21:19

## 2023-12-11 RX ADMIN — LITHIUM CARBONATE 300 MG: 300 TABLET, EXTENDED RELEASE ORAL at 08:51

## 2023-12-11 RX ADMIN — OLANZAPINE 5 MG: 5 TABLET, FILM COATED ORAL at 12:30

## 2023-12-11 RX ADMIN — OLANZAPINE 20 MG: 10 TABLET, FILM COATED ORAL at 21:19

## 2023-12-11 RX ADMIN — BENZTROPINE MESYLATE 0.5 MG: 0.5 TABLET ORAL at 17:25

## 2023-12-11 RX ADMIN — LITHIUM CARBONATE 1200 MG: 450 TABLET, EXTENDED RELEASE ORAL at 21:18

## 2023-12-11 RX ADMIN — BENZTROPINE MESYLATE 0.5 MG: 0.5 TABLET ORAL at 08:51

## 2023-12-11 RX ADMIN — MELATONIN TAB 3 MG 3 MG: 3 TAB at 21:19

## 2023-12-11 RX ADMIN — CHOLECALCIFEROL TAB 25 MCG (1000 UNIT) 2000 UNITS: 25 TAB at 08:52

## 2023-12-11 NOTE — NURSING NOTE
Upon reassessment one hour later at 2216 from the PRN trazodone 50 mg patient received at 2116 patient is observed resting in their bed comfortably. Medication effective.

## 2023-12-11 NOTE — PROGRESS NOTES
12/11/23 1556   Team Meeting   Meeting Type Daily Rounds   Team Members Present   Team Members Present Physician;Nurse;   Physician Team Member Mark   Nursing Team Member Earl   Care Management Team Member Miguelito Camarillo   Patient/Family Present   Patient Present No   Patient's Family Present No     Patient currently holds 201 status. Patient is medication and meal compliant. Patient denies all symptoms aside from AH. Patient is to continue on cogentin 0.5 BID, Lexapro 15 mg, Haldol 10 mg daily, Lithium 1200 mg HS, Lithium 300 mg daily, Remeron 45 mg HS, Zyprexa 5 mg daily, and Zyprexa 20 mg HS. Patient discharge date and plan to be determined.

## 2023-12-11 NOTE — NURSING NOTE
Pt denies SI and HI. Verbalizes that audio hallucinations with violent and suicidal references continue, but they are quieter than admission condition. Appearance is in personal attire, compliant with meds, observed usually resting in bed, quiet and isolative but pleasant with staff. Calm demeanor, no judgement or behavioral concerns. Pt is not threat to self and verbalizes feeling stable despite living with the voices.

## 2023-12-11 NOTE — PLAN OF CARE
Problem: PSYCHOSIS  Goal: Will report no hallucinations or delusions  Description: Interventions:  - Administer medication as  ordered  - Every waking shifts and PRN assess for the presence of hallucinations and or delusions  - Assist with reality testing to support increasing orientation  - Assess if patient's hallucinations or delusions are encouraging self-harm or harm to others and intervene as appropriate  Outcome: Not Progressing     Problem: ANXIETY  Goal: Will report anxiety at manageable levels  Description: INTERVENTIONS:  - Administer medication as ordered  - Teach and encourage coping skills  - Provide emotional support  - Assess patient/family for anxiety and ability to cope  Outcome: Progressing     Problem: Ineffective Coping  Goal: Participates in unit activities  Description: Interventions:  - Provide therapeutic environment   - Provide required programming   - Redirect inappropriate behaviors   Outcome: Progressing     Problem: Depression  Goal: Treatment Goal: Demonstrate behavioral control of depressive symptoms, verbalize feelings of improved mood/affect, and adopt new coping skills prior to discharge  Outcome: Progressing  Goal: Verbalize thoughts and feelings  Description: Interventions:  - Assess and re-assess patient's level of risk   - Engage patient in 1:1 interactions, daily, for a minimum of 15 minutes   - Encourage patient to express feelings, fears, frustrations, hopes   Outcome: Progressing  Goal: Refrain from isolation  Description: Interventions:  - Develop a trusting relationship   - Encourage socialization   Outcome: Progressing  Goal: Refrain from self-neglect  Outcome: Progressing

## 2023-12-11 NOTE — NURSING NOTE
Patient was visible most of the evening in the dayroom watching the [employer] game. Was sitting near peers but not engaging in the card game taking place. Reports auditory hallucinations of voices that him and his family will die. Denied depression, anxiety, SI, or HI.    Medication compliant. Requested and received PRN trazodone 50 mg for insomnia at 2116. Retreated to bed on their own without any issues.

## 2023-12-11 NOTE — PROGRESS NOTES
Progress Note - Behavioral Health     Albreto Berumen 28 y.o. male MRN: [id number]   Unit/Bed#: [facility] 383-02 Encounter: [id number]    Documentation, nursing notes, medication reconciliation, labs, and vitals reviewed. Patient was seen for continuing care and reviewed with treatment team. No acute events over the past 24 hours.  Per nursing report, continues to present as flat and depressed, isolative to his room, out of his room more in the evenings, continues to complain of auditory hallucinations.    No medication changes over the past 24 hours. Continues to tolerate current medications with no adverse effects. On evaluation today, he continues to report auditory hallucinations making derogatory statements or threatening statements.  Continues to find these stressful.  They are worse in the morning.  Some improvement in the evenings and he is out of his room more and social at that time.  I did review with him due to insurance issues we could not consider ECT at this time.  We continue with med changes and will optimize Zyprexa while starting to decrease and consider switching out the Haldol.  He is agreeable to these changes and verbalizes understanding.  No suicidal ideation, plan, or intent, and does not exhibit any symptoms of aimee on evaluation.    Psychiatric ROS:  Behavior over the last 24 hours: unchanged  Sleep: slept off and on  Appetite: fair  Medication side effects: No   ROS: no complaints, all other systems are negative    Mental Status Evaluation:    Appearance:  age appropriate   Behavior:  cooperative   Speech:  slow, soft   Mood:  depressed   Affect:  flat   Thought Process:  goal directed   Associations: intact associations   Thought Content:  persecutory delusions   Perceptual Disturbances: auditory hallucinations   Risk Potential: Suicidal ideation - None  Homicidal ideation - None  Potential for aggression - No   Sensorium:  oriented to person, place, and time/date   Memory:  recent and remote  memory grossly intact   Consciousness:  alert and awake   Attention/Concentration: decreased concentration and decreased attention span   Insight:  limited   Judgment: limited   Gait/Station: normal gait/station, normal balance   Motor Activity: no abnormal movements     Vital signs in last 24 hours:    Temp:  [98.3 °F (36.8 °C)] 98.3 °F (36.8 °C)  HR:  [110] 110  Resp:  [18] 18  BP: (140)/(75) 140/75    Laboratory results: I have personally reviewed all pertinent laboratory/tests results    Results from the past 24 hours: No results found for this or any previous visit (from the past 24 hour(s)).    Suicide/Homicide Risk Assessment:    Risk of Harm to Self:   Current Specific Risk Factors include: current depressive symptoms, current psychotic symptoms  Protective Factors: no current suicidal ideation, ability to communicate with staff on the unit, able to contract for safety on the unit, taking medications as ordered on the unit  Based on today's assessment, Alberto presents the following risk of harm to self: minimal    Risk of Harm to Others:  Current Specific Risk Factors include: current psychotic symptoms  Protective Factors: no current homicidal ideation, able to communicate with staff on the unit, psychotic symptoms are less prominent, compliant with medications on the unit as ordered  Based on today's assessment, Alberto presents the following risk of harm to others: minimal    The following interventions are recommended: behavioral checks every 7 minutes, continued hospitalization on locked unit    Progress Toward Goals: no significant improvement, still has psychotic symptoms    Assessment/Plan   Principal Problem:    Schizoaffective disorder, bipolar type (HCC)  Active Problems:    GERD (gastroesophageal reflux disease)    Medical clearance for psychiatric admission    Tobacco abuse    T wave inversion in EKG    Chronic idiopathic constipation    Vitamin B 12 deficiency    Vitamin D  deficiency      Recommended Treatment:     Planned medication and treatment changes:    All current active medications have been reviewed  Encourage group therapy, milieu therapy and occupational therapy  Behavioral Health checks every 7 minutes    Recheck BMP in the morning due to sodium on 12/9 was 132    Increase Zyprexa, add morning dose of 5 mg  Decrease Haldol to 10 mg in the morning and continue 15 at bed    Continue all of his psychiatric medications the same    Current Facility-Administered Medications   Medication Dose Route Frequency Provider Last Rate    acetaminophen  650 mg Oral Q6H PRN Yasmin Harvey MD      acetaminophen  650 mg Oral Q4H PRN Yasmin Harvey MD      acetaminophen  975 mg Oral Q6H PRN Yasmin Harvey MD      aluminum-magnesium hydroxide-simethicone  30 mL Oral Q4H PRN Yasmin Harvey MD      haloperidol lactate  2.5 mg Intramuscular Q4H PRN Max 4/day Yasmin Harvey MD      And    LORazepam  1 mg Intramuscular Q4H PRN Max 4/day Yasmin Harvey MD      And    benztropine  0.5 mg Intramuscular Q4H PRN Max 4/day Yasmin Harvey MD      haloperidol lactate  5 mg Intramuscular Q4H PRN Max 4/day Yasmin Harvey MD      And    LORazepam  2 mg Intramuscular Q4H PRN Max 4/day Yasmin Harvey MD      And    benztropine  1 mg Intramuscular Q4H PRN Max 4/day Yasmin Harvey MD      benztropine  0.5 mg Oral BID La Flores MD      benztropine  1 mg Oral Q4H PRN Max 6/day Yasmin Harvey MD      bisacodyl  10 mg Rectal Daily PRN Yasmin Harvey MD      cholecalciferol  2,000 Units Oral Daily HOLLI Monge      cyanocobalamin  1,000 mcg Oral Daily HOLLI Monge      hydrOXYzine HCL  50 mg Oral Q6H PRN Max 4/day Yasmin Harvey MD      Or    diphenhydrAMINE  50 mg Intramuscular Q6H PRN Yasmin Harvey MD      escitalopram  15 mg Oral Daily La Flores MD      haloperidol  1 mg Oral Q6H PRN Yasmin Harvey MD      [START ON  12/12/2023] haloperidol  10 mg Oral Daily HOLLI Anderson      haloperidol  15 mg Oral QPM La Flores MD      haloperidol  2.5 mg Oral Q4H PRN Max 4/day Yasmin Harvey MD      haloperidol  5 mg Oral Q4H PRN Max 4/day Yasmin Harvey MD      hydrOXYzine HCL  100 mg Oral Q6H PRN Max 4/day Yasmin Harvey MD      Or    LORazepam  2 mg Intramuscular Q6H PRN Yasmin Harvey MD      hydrOXYzine HCL  25 mg Oral Q6H PRN Max 4/day Yasmin Harvey MD      lithium carbonate  1,200 mg Oral HS HOLLI Anderson      lithium carbonate  300 mg Oral Daily HOLLI Anderson      melatonin  3 mg Oral HS Yasmin Harvey MD      mirtazapine  45 mg Oral HS La Flores MD      nicotine polacrilex  4 mg Oral Q2H PRN Yasmin Harvey MD      OLANZapine  20 mg Oral HS La Flores MD      OLANZapine  5 mg Oral Daily HOLLI Anderson      polyethylene glycol  17 g Oral Daily PRN Yasmin Harvey MD      polyethylene glycol  17 g Oral Daily HOLLI Monge      senna-docusate sodium  1 tablet Oral Daily PRN Yasmin Harvey MD      traZODone  50 mg Oral HS PRN Yasmin Harvey MD       Risks / Benefits of Treatment:    Risks, benefits, and possible side effects of medications explained to patient and patient verbalizes understanding and agreement for treatment.  The patient has a history of at least 3 antipsychotic medication trials and at this time requires treatment with 2 antipsychotic agents (Zyprexa and Haldol) due to failed multiple trials of monotherapy.    Counseling / Coordination of Care:    Patient's progress discussed with staff in treatment team meeting.  Medications, treatment progress and treatment plan reviewed with patient.    HOLLI Anderson 12/11/23

## 2023-12-12 LAB
ANION GAP SERPL CALCULATED.3IONS-SCNC: 7 MMOL/L
BUN SERPL-MCNC: 11 MG/DL (ref 5–25)
CALCIUM SERPL-MCNC: 9.2 MG/DL (ref 8.4–10.2)
CHLORIDE SERPL-SCNC: 101 MMOL/L (ref 96–108)
CO2 SERPL-SCNC: 27 MMOL/L (ref 21–32)
CREAT SERPL-MCNC: 0.87 MG/DL (ref 0.6–1.3)
GFR SERPL CREATININE-BSD FRML MDRD: 118 ML/MIN/1.73SQ M
GLUCOSE P FAST SERPL-MCNC: 115 MG/DL (ref 65–99)
GLUCOSE SERPL-MCNC: 115 MG/DL (ref 65–140)
POTASSIUM SERPL-SCNC: 4 MMOL/L (ref 3.5–5.3)
SODIUM SERPL-SCNC: 135 MMOL/L (ref 135–147)

## 2023-12-12 PROCEDURE — 99233 SBSQ HOSP IP/OBS HIGH 50: CPT | Performed by: PSYCHIATRY & NEUROLOGY

## 2023-12-12 PROCEDURE — 80048 BASIC METABOLIC PNL TOTAL CA: CPT | Performed by: PSYCHIATRY & NEUROLOGY

## 2023-12-12 RX ORDER — HALOPERIDOL 10 MG/1
10 TABLET ORAL EVERY EVENING
Status: DISCONTINUED | OUTPATIENT
Start: 2023-12-12 | End: 2023-12-14

## 2023-12-12 RX ORDER — OLANZAPINE 10 MG/1
10 TABLET ORAL DAILY
Status: DISCONTINUED | OUTPATIENT
Start: 2023-12-13 | End: 2023-12-30

## 2023-12-12 RX ADMIN — BENZTROPINE MESYLATE 0.5 MG: 0.5 TABLET ORAL at 08:19

## 2023-12-12 RX ADMIN — POLYETHYLENE GLYCOL 3350 17 G: 17 POWDER, FOR SOLUTION ORAL at 08:19

## 2023-12-12 RX ADMIN — ESCITSLOPRAM 15 MG: 5 TABLET ORAL at 08:19

## 2023-12-12 RX ADMIN — CYANOCOBALAMIN TAB 1000 MCG 1000 MCG: 1000 TAB at 08:18

## 2023-12-12 RX ADMIN — BENZTROPINE MESYLATE 0.5 MG: 0.5 TABLET ORAL at 17:14

## 2023-12-12 RX ADMIN — LITHIUM CARBONATE 300 MG: 300 TABLET, EXTENDED RELEASE ORAL at 08:19

## 2023-12-12 RX ADMIN — LITHIUM CARBONATE 1200 MG: 450 TABLET, EXTENDED RELEASE ORAL at 21:08

## 2023-12-12 RX ADMIN — CHOLECALCIFEROL TAB 25 MCG (1000 UNIT) 2000 UNITS: 25 TAB at 08:19

## 2023-12-12 RX ADMIN — OLANZAPINE 20 MG: 10 TABLET, FILM COATED ORAL at 21:07

## 2023-12-12 RX ADMIN — OLANZAPINE 5 MG: 5 TABLET, FILM COATED ORAL at 08:18

## 2023-12-12 RX ADMIN — HALOPERIDOL 10 MG: 10 TABLET ORAL at 17:14

## 2023-12-12 RX ADMIN — HALOPERIDOL 10 MG: 10 TABLET ORAL at 08:19

## 2023-12-12 RX ADMIN — MELATONIN TAB 3 MG 3 MG: 3 TAB at 21:08

## 2023-12-12 RX ADMIN — MIRTAZAPINE 45 MG: 30 TABLET, FILM COATED ORAL at 21:08

## 2023-12-12 RX ADMIN — NICOTINE POLACRILEX 4 MG: 4 GUM, CHEWING ORAL at 19:36

## 2023-12-12 NOTE — CASE MANAGEMENT
Writer checked in with patient. Patient shared that he is feeling better on new mediation regimen. Patient and writer briefly discussed the delay on information about insurance. Patient disclosed he has no unmet needs at this time.

## 2023-12-12 NOTE — NURSING NOTE
Pt is calm and cooperative. Visible and social on the unit, able to make needs known. Reports ongoing AH that are manageable, denies SI, HI, VH, and pain. Compliant with snack and medication. Denies unmet needs/ concerns at this time.

## 2023-12-12 NOTE — PROGRESS NOTES
12/12/23 1128   Team Meeting   Meeting Type Daily Rounds   Team Members Present   Team Members Present Physician;Nurse;   Physician Team Member Mark   Nursing Team Member Giancarlo   Care Management Team Member Miguelito Camarillo   Patient/Family Present   Patient Present No   Patient's Family Present No     Patient currently holds 201 status. Patient is medication and meal compliant. Patient denies SI/HI/VH bit confirms AH. Patient will begin taking increased dose of Zyprexa at 10 mg daily and 20 mg HS and decreased dose of haldol 10 mg BID. Patient discharge plan and date to be determined.

## 2023-12-12 NOTE — PROGRESS NOTES
"Progress Note - Behavioral Health     Alberto Berumen 28 y.o. male MRN: [id number]   Unit/Bed#: [facility] [place] 383-02 Encounter: [id number]    Documentation, nursing notes, medication reconciliation, labs, and vitals reviewed. Patient was seen for continuing care and reviewed with treatment team. No acute events over the past 24 hours.  Per nursing report, continues isolative during the day some increase in socializing in the evening.  Continues to report auditory hallucinations but states they are \"more manageable \"  .  Medication changes over the past 24 hours: Increased Zyprexa to include a 5 mg dose in the decreased Haldol to 10 mg in the morning. Continues to tolerate current medications with no adverse effects.     On evaluation today, he is seen in his room and sitting up in bed.  Continues to report moderate depression.  Continues to report auditory hallucinations of a derogatory nature or threatening.  Tolerating addition of Zyprexa in the morning and currently reports no increase in tiredness..  No suicidal ideation, plan, or intent,  and does not exhibit any symptoms of aimee on evaluation.    Psychiatric ROS:  Behavior over the last 24 hours: unchanged  Sleep: slept off and on  Appetite: fair  Medication side effects: No   ROS: no complaints, all other systems are negative    Mental Status Evaluation:    Appearance:  marginal hygiene   Behavior:  cooperative, guarded   Speech:  scant, soft   Mood:  depressed   Affect:  constricted   Thought Process:  decreased rate of thoughts   Associations: concrete associations   Thought Content:  some paranoia   Perceptual Disturbances: auditory hallucinations   Risk Potential: Suicidal ideation - None  Homicidal ideation - None  Potential for aggression - No   Sensorium:  oriented to person, place, and time/date   Memory:  recent and remote memory grossly intact   Consciousness:  alert and awake   Attention/Concentration: decreased concentration and decreased attention span "   Insight:  limited   Judgment: limited   Gait/Station: normal gait/station, normal balance   Motor Activity: no abnormal movements     Vital signs in last 24 hours:    Temp:  [98 °F (36.7 °C)-98.5 °F (36.9 °C)] 98 °F (36.7 °C)  HR:  [] 92  Resp:  [16-18] 16  BP: (146-148)/(83-85) 148/83    Laboratory results: I have personally reviewed all pertinent laboratory/tests results    Results from the past 24 hours:   Recent Results (from the past 24 hour(s))   Basic metabolic panel    Collection Time: 12/12/23  6:32 AM   Result Value Ref Range    Sodium 135 135 - 147 mmol/L    Potassium 4.0 3.5 - 5.3 mmol/L    Chloride 101 96 - 108 mmol/L    CO2 27 21 - 32 mmol/L    ANION GAP 7 mmol/L    BUN 11 5 - 25 mg/dL    Creatinine 0.87 0.60 - 1.30 mg/dL    Glucose 115 65 - 140 mg/dL    Glucose, Fasting 115 (H) 65 - 99 mg/dL    Calcium 9.2 8.4 - 10.2 mg/dL    eGFR 118 ml/min/1.73sq m       Suicide/Homicide Risk Assessment:    Risk of Harm to Self:   Current Specific Risk Factors include: mental illness diagnosis, current psychotic symptoms  Protective Factors: no current suicidal ideation, ability to communicate with staff on the unit, able to contract for safety on the unit, taking medications as ordered on the unit  Based on today's assessment, Alberto presents the following risk of harm to self: minimal    Risk of Harm to Others:  Current Specific Risk Factors include: current psychotic symptoms  Protective Factors: no current homicidal ideation  Based on today's assessment, Alberto presents the following risk of harm to others: minimal    The following interventions are recommended: behavioral checks every 7 minutes, continued hospitalization on locked unit    Progress Toward Goals: minimal improvement    Assessment/Plan   Principal Problem:    Schizoaffective disorder, bipolar type (HCC)  Active Problems:    GERD (gastroesophageal reflux disease)    Medical clearance for psychiatric admission    Tobacco abuse    T wave  inversion in EKG    Chronic idiopathic constipation    Vitamin B 12 deficiency    Vitamin D deficiency      Recommended Treatment:     Planned medication and treatment changes:    All current active medications have been reviewed  Encourage group therapy, milieu therapy and occupational therapy  Behavioral Health checks every 7 minutes  Increase Zyprexa to 10 mg in the morning to start tomorrow, continue 20 mg at bedtime  Decrease Haldol 10 mg twice daily    Current Facility-Administered Medications   Medication Dose Route Frequency Provider Last Rate    acetaminophen  650 mg Oral Q6H PRN Yasmin Harvey MD      acetaminophen  650 mg Oral Q4H PRN Yasmin Harvey MD      acetaminophen  975 mg Oral Q6H PRN Yasmin Harvey MD      aluminum-magnesium hydroxide-simethicone  30 mL Oral Q4H PRN Yasmin Harvey MD      haloperidol lactate  2.5 mg Intramuscular Q4H PRN Max 4/day Yasmin Harvey MD      And    LORazepam  1 mg Intramuscular Q4H PRN Max 4/day Yasmin Harvey MD      And    benztropine  0.5 mg Intramuscular Q4H PRN Max 4/day Yasmin Harvey MD      haloperidol lactate  5 mg Intramuscular Q4H PRN Max 4/day Yasmin Harvey MD      And    LORazepam  2 mg Intramuscular Q4H PRN Max 4/day Yasmin Harvey MD      And    benztropine  1 mg Intramuscular Q4H PRN Max 4/day Yasmin Harvey MD      benztropine  0.5 mg Oral BID La Flores MD      benztropine  1 mg Oral Q4H PRN Max 6/day Yasmin Harvey MD      bisacodyl  10 mg Rectal Daily PRN Yasmin Harvey MD      cholecalciferol  2,000 Units Oral Daily HOLLI Monge      cyanocobalamin  1,000 mcg Oral Daily HOLLI Monge      hydrOXYzine HCL  50 mg Oral Q6H PRN Max 4/day Yasmin Harvey MD      Or    diphenhydrAMINE  50 mg Intramuscular Q6H PRN Yasmin Harvey MD      escitalopram  15 mg Oral Daily La Flores MD      haloperidol  1 mg Oral Q6H PRN Yasmin Harvey MD      haloperidol  10 mg Oral  Daily HOLLI Anderson      haloperidol  10 mg Oral QPM HOLLI Anderson      haloperidol  2.5 mg Oral Q4H PRN Max 4/day Yasmin Harvey MD      haloperidol  5 mg Oral Q4H PRN Max 4/day Yasmin Harvey MD      hydrOXYzine HCL  100 mg Oral Q6H PRN Max 4/day Yasmin Harvey MD      Or    LORazepam  2 mg Intramuscular Q6H PRN Yasmin Harvey MD      hydrOXYzine HCL  25 mg Oral Q6H PRN Max 4/day Yasmin Harvey MD      lithium carbonate  1,200 mg Oral HS HOLLI Anderson      lithium carbonate  300 mg Oral Daily HOLLI Anderson      melatonin  3 mg Oral HS Yasmin Harvey MD      mirtazapine  45 mg Oral HS La Flores MD      nicotine polacrilex  4 mg Oral Q2H PRN Yasmin Harvey MD      [START ON 12/13/2023] OLANZapine  10 mg Oral Daily HOLLI Anderson      OLANZapine  20 mg Oral HS La Flores MD      polyethylene glycol  17 g Oral Daily PRN Yasmin Harvey MD      polyethylene glycol  17 g Oral Daily HOLLI Monge      senna-docusate sodium  1 tablet Oral Daily PRN Yasmin Harvey MD      traZODone  50 mg Oral HS PRN Yasmin Harvey MD       Risks / Benefits of Treatment:    Risks, benefits, and possible side effects of medications explained to patient and patient verbalizes understanding and agreement for treatment.  The patient has a history of at least 3 antipsychotic medication trials and at this time requires treatment with 2 antipsychotic agents (Zyprexa and Haldol) due to failed multiple trials of monotherapy.    Counseling / Coordination of Care:    Patient's progress discussed with staff in treatment team meeting.  Medications, treatment progress and treatment plan reviewed with patient.    HOLLI Anderson 12/12/23

## 2023-12-12 NOTE — NURSING NOTE
Pt denies SI, HI and VH. Pt continues to report AH but states they are manageable. Pt is isolative to self and room with brief intervals in the milieu for meals and needs. Pt is calm and cooperative. Medication compliant.

## 2023-12-13 LAB — LITHIUM SERPL-SCNC: 0.6 MMOL/L (ref 0.6–1.2)

## 2023-12-13 PROCEDURE — 80178 ASSAY OF LITHIUM: CPT | Performed by: PSYCHIATRY & NEUROLOGY

## 2023-12-13 PROCEDURE — 99233 SBSQ HOSP IP/OBS HIGH 50: CPT | Performed by: PSYCHIATRY & NEUROLOGY

## 2023-12-13 RX ORDER — HALOPERIDOL 5 MG/1
5 TABLET ORAL DAILY
Status: DISCONTINUED | OUTPATIENT
Start: 2023-12-14 | End: 2023-12-14

## 2023-12-13 RX ORDER — ESCITALOPRAM OXALATE 10 MG/1
20 TABLET ORAL DAILY
Status: DISCONTINUED | OUTPATIENT
Start: 2023-12-14 | End: 2024-03-29 | Stop reason: HOSPADM

## 2023-12-13 RX ADMIN — MIRTAZAPINE 45 MG: 30 TABLET, FILM COATED ORAL at 21:54

## 2023-12-13 RX ADMIN — CHOLECALCIFEROL TAB 25 MCG (1000 UNIT) 2000 UNITS: 25 TAB at 08:07

## 2023-12-13 RX ADMIN — ESCITSLOPRAM 15 MG: 5 TABLET ORAL at 08:07

## 2023-12-13 RX ADMIN — BENZTROPINE MESYLATE 0.5 MG: 0.5 TABLET ORAL at 17:39

## 2023-12-13 RX ADMIN — POLYETHYLENE GLYCOL 3350 17 G: 17 POWDER, FOR SOLUTION ORAL at 08:08

## 2023-12-13 RX ADMIN — OLANZAPINE 10 MG: 10 TABLET, FILM COATED ORAL at 08:07

## 2023-12-13 RX ADMIN — LITHIUM CARBONATE 1200 MG: 450 TABLET, EXTENDED RELEASE ORAL at 21:54

## 2023-12-13 RX ADMIN — HALOPERIDOL 10 MG: 10 TABLET ORAL at 08:08

## 2023-12-13 RX ADMIN — OLANZAPINE 20 MG: 10 TABLET, FILM COATED ORAL at 21:54

## 2023-12-13 RX ADMIN — HALOPERIDOL 10 MG: 10 TABLET ORAL at 17:39

## 2023-12-13 RX ADMIN — MELATONIN TAB 3 MG 3 MG: 3 TAB at 21:54

## 2023-12-13 RX ADMIN — NICOTINE POLACRILEX 4 MG: 4 GUM, CHEWING ORAL at 13:24

## 2023-12-13 RX ADMIN — NICOTINE POLACRILEX 4 MG: 4 GUM, CHEWING ORAL at 18:22

## 2023-12-13 RX ADMIN — BENZTROPINE MESYLATE 0.5 MG: 0.5 TABLET ORAL at 08:08

## 2023-12-13 RX ADMIN — CYANOCOBALAMIN TAB 1000 MCG 1000 MCG: 1000 TAB at 08:08

## 2023-12-13 RX ADMIN — LITHIUM CARBONATE 300 MG: 300 TABLET, EXTENDED RELEASE ORAL at 08:07

## 2023-12-13 NOTE — NURSING NOTE
Pt denies SI, HI and VH. Pt reports AH but states they are manageable. Pt is calm, cooperative and pleasant. Pt is isolative to self and room with brief intervals in the milieu for meals and needs. Medication and meal compliant.

## 2023-12-13 NOTE — NURSING NOTE
Patient is visible on the unit, sitting dayroom, socializing with peers and watching tv. Patient is calm, cooperative, and compliant with medications. Patient denies SI/HI/AVH. No complaints or unmet needs identified at this time. Q 7 minute safety checks maintained.

## 2023-12-13 NOTE — PROGRESS NOTES
"Progress Note - Behavioral Health     Alberto Berumen 28 y.o. male MRN: [id number]   Unit/Bed#: -02 Encounter: [id number]    Behavior over the last 24 hours: improving.     Alberto is a 28 y.o. male diagnosed with schizoaffective disorder (bipolar type) being seen today for psychiatric follow-up. As per staff, the patient is isolative but is calm, cooperative and is medication and meal compliant. Upon observation, the patient was in bed, covered with a blanket and appears tired. He is scant and superficial in his responses, but has intact associations. He is making minimal eye contact. He reports that he is doing better but still is having auditory hallucinations threatening him that they are going to kill his family. He states that the voices have toned down since the increase in his Zyprexa. Currently he denies any SI/HI/VH. Discussed cross titration of Zyprexa and Haldol, he reports he is doing well with the medication adjustments and is not experiencing any side effects such as sedation at this time. No SI/HI.     Sleep: slept off and on, reports waking up a few times   Appetite: normal  Medication side effects: No   ROS: no complaints, all other systems are negative    Mental Status Evaluation:    Appearance:  marginal hygiene, looks stated age, covered in a blanket    Behavior:  pleasant, cooperative, calm, guarded   Speech:  normal rate and volume, coherent, scant   Mood:  euthymic, \"I'm fine\"    Affect:  blunted, mood-incongruent   Thought Process:  coherent, goal directed, linear   Associations: intact associations   Thought Content:  no overt delusions, preoccupied with the voices , less paranoid   Perceptual Disturbances: auditory hallucinations \"threatening to kill his family\", denies visual hallucinations     Risk Potential: Suicidal ideation - None at present  Homicidal ideation - None at present  Potential for aggression - No   Sensorium:  oriented to person, place, and time/date   Memory:  " recent and remote memory grossly intact   Consciousness:  alert and awake   Attention/Concentration: attention span and concentration are age appropriate   Insight:  limited   Judgment: limited   Gait/Station: in bed, unable to assess   Motor Activity: no abnormal movements     Vital signs in last 24 hours:    Temp:  [97.8 °F (36.6 °C)-98.5 °F (36.9 °C)] 97.8 °F (36.6 °C)  HR:  [85-98] 85  Resp:  [16-18] 18  BP: (139-159)/(78-93) 139/78    Laboratory results:  BMP: 12/12/23, at 0632: fasting glucose: 115 mg/dL, the remainder is wnl     Results from the past 24 hours: No results found for this or any previous visit (from the past 24 hour(s)).  Most Recent Labs:   Lab Results   Component Value Date    WBC 10.15 11/15/2023    RBC 5.32 11/15/2023    HGB 12.8 11/15/2023    HCT 42.4 11/15/2023     11/15/2023    RDW 14.0 11/15/2023    NEUTROABS 6.25 11/15/2023    SODIUM 135 12/12/2023    K 4.0 12/12/2023     12/12/2023    CO2 27 12/12/2023    BUN 11 12/12/2023    CREATININE 0.87 12/12/2023    GLUC 115 12/12/2023    CALCIUM 9.2 12/12/2023    AST 22 11/15/2023    ALT 40 11/15/2023    ALKPHOS 69 11/15/2023    TP 6.5 11/15/2023    ALB 4.1 11/15/2023    TBILI 0.45 11/15/2023    CHOLESTEROL 169 11/15/2023    HDL 37 (L) 11/15/2023    TRIG 117 11/15/2023    LDLCALC 109 (H) 11/15/2023    NONHDLC 132 11/15/2023    LITHIUM 0.8 12/09/2023    [id number] 1.062 11/15/2023    SYPHILISAB Non-reactive 11/18/2023    HGBA1C 4.9 11/15/2023    EAG 94 11/15/2023       Progress Toward Goals: some progress    Assessment/Plan   Principal Problem:    Schizoaffective disorder, bipolar type (HCC)  Active Problems:    GERD (gastroesophageal reflux disease)    Medical clearance for psychiatric admission    Tobacco abuse    T wave inversion in EKG    Chronic idiopathic constipation    Vitamin B 12 deficiency    Vitamin D deficiency      Recommended Treatment:     Planned medication and treatment changes:    All current active medications  have been reviewed  Encourage group therapy, milieu therapy and occupational therapy  Behavioral Health checks every 7 minutes    Continue cross titration between Zyprexa and Haldol; will decrease haldol for tomorrow to 5 mg QAM and 10 mg QHS; consider increase in Zyprexa as tolerated  DC planning ongoing - continues to require inpatient hospitalization at present due to ongoing medication adjustments & psychosis    Current Facility-Administered Medications   Medication Dose Route Frequency Provider Last Rate    acetaminophen  650 mg Oral Q6H PRN Yasmin Harvey MD      acetaminophen  650 mg Oral Q4H PRN Yasmin Harvey MD      acetaminophen  975 mg Oral Q6H PRN Yasmin Harvey MD      aluminum-magnesium hydroxide-simethicone  30 mL Oral Q4H PRN Yasmin Harvey MD      haloperidol lactate  2.5 mg Intramuscular Q4H PRN Max 4/day Yasmin Harvey MD      And    LORazepam  1 mg Intramuscular Q4H PRN Max 4/day Yasmin Harvey MD      And    benztropine  0.5 mg Intramuscular Q4H PRN Max 4/day Yasmin Harvey MD      haloperidol lactate  5 mg Intramuscular Q4H PRN Max 4/day Yasmin Harvey MD      And    LORazepam  2 mg Intramuscular Q4H PRN Max 4/day Yasmin Harvey MD      And    benztropine  1 mg Intramuscular Q4H PRN Max 4/day Yasmin Harvey MD      benztropine  0.5 mg Oral BID La Flores MD      benztropine  1 mg Oral Q4H PRN Max 6/day Yasmin Harvey MD      bisacodyl  10 mg Rectal Daily PRN Yasmin Harvey MD      cholecalciferol  2,000 Units Oral Daily HOLLI Monge      cyanocobalamin  1,000 mcg Oral Daily HOLLI Monge      hydrOXYzine HCL  50 mg Oral Q6H PRN Max 4/day Yasmin Harvey MD      Or    diphenhydrAMINE  50 mg Intramuscular Q6H PRN Yasmin Harvey MD      escitalopram  15 mg Oral Daily La Flores MD      haloperidol  1 mg Oral Q6H PRN Yasmin Harvey MD      haloperidol  10 mg Oral Daily HOLLI Anderson      haloperidol  10  mg Oral QPM HOLLI Anderson      haloperidol  2.5 mg Oral Q4H PRN Max 4/day Yasmin Harvey MD      haloperidol  5 mg Oral Q4H PRN Max 4/day Yasmin Harvey MD      hydrOXYzine HCL  100 mg Oral Q6H PRN Max 4/day Yasmin Harvey MD      Or    LORazepam  2 mg Intramuscular Q6H PRN Yasmin Harvey MD      hydrOXYzine HCL  25 mg Oral Q6H PRN Max 4/day Yasmin Harvey MD      lithium carbonate  1,200 mg Oral HS HOLLI Anderson      lithium carbonate  300 mg Oral Daily HOLLI Anderson      melatonin  3 mg Oral HS Yasmin Harvey MD      mirtazapine  45 mg Oral HS La Flores MD      nicotine polacrilex  4 mg Oral Q2H PRN Yasmin Harvey MD      OLANZapine  10 mg Oral Daily HOLLI Anderson      OLANZapine  20 mg Oral HS La Flores MD      polyethylene glycol  17 g Oral Daily PRN Yasmin Harvey MD      polyethylene glycol  17 g Oral Daily HOLLI Monge      senna-docusate sodium  1 tablet Oral Daily PRN Yasmin Harvey MD      traZODone  50 mg Oral HS PRN Yasmin Harvey MD       Risks / Benefits of Treatment:    Risks, benefits, and possible side effects of medications explained to patient and patient verbalizes understanding and agreement for treatment.    Counseling / Coordination of Care:    Patient's progress discussed with staff in treatment team meeting.  Medications, treatment progress and treatment plan reviewed with patient.    Verónica Jo PA-C 12/13/23

## 2023-12-14 PROCEDURE — 99233 SBSQ HOSP IP/OBS HIGH 50: CPT | Performed by: STUDENT IN AN ORGANIZED HEALTH CARE EDUCATION/TRAINING PROGRAM

## 2023-12-14 RX ORDER — HALOPERIDOL 10 MG/1
10 TABLET ORAL ONCE
Status: COMPLETED | OUTPATIENT
Start: 2023-12-14 | End: 2023-12-14

## 2023-12-14 RX ORDER — HALOPERIDOL 5 MG/1
5 TABLET ORAL 2 TIMES DAILY
Status: DISCONTINUED | OUTPATIENT
Start: 2023-12-15 | End: 2023-12-17

## 2023-12-14 RX ADMIN — HALOPERIDOL 5 MG: 5 TABLET ORAL at 08:09

## 2023-12-14 RX ADMIN — CHOLECALCIFEROL TAB 25 MCG (1000 UNIT) 2000 UNITS: 25 TAB at 08:09

## 2023-12-14 RX ADMIN — MIRTAZAPINE 45 MG: 30 TABLET, FILM COATED ORAL at 21:18

## 2023-12-14 RX ADMIN — OLANZAPINE 10 MG: 10 TABLET, FILM COATED ORAL at 08:09

## 2023-12-14 RX ADMIN — POLYETHYLENE GLYCOL 3350 17 G: 17 POWDER, FOR SOLUTION ORAL at 08:08

## 2023-12-14 RX ADMIN — NICOTINE POLACRILEX 4 MG: 4 GUM, CHEWING ORAL at 13:33

## 2023-12-14 RX ADMIN — LITHIUM CARBONATE 300 MG: 300 TABLET, EXTENDED RELEASE ORAL at 08:09

## 2023-12-14 RX ADMIN — HALOPERIDOL 10 MG: 10 TABLET ORAL at 17:41

## 2023-12-14 RX ADMIN — BENZTROPINE MESYLATE 0.5 MG: 0.5 TABLET ORAL at 17:41

## 2023-12-14 RX ADMIN — ESCITALOPRAM OXALATE 20 MG: 10 TABLET ORAL at 08:09

## 2023-12-14 RX ADMIN — LITHIUM CARBONATE 1200 MG: 450 TABLET, EXTENDED RELEASE ORAL at 21:18

## 2023-12-14 RX ADMIN — OLANZAPINE 25 MG: 5 TABLET, FILM COATED ORAL at 21:18

## 2023-12-14 RX ADMIN — CYANOCOBALAMIN TAB 1000 MCG 1000 MCG: 1000 TAB at 08:09

## 2023-12-14 RX ADMIN — MELATONIN TAB 3 MG 3 MG: 3 TAB at 21:18

## 2023-12-14 RX ADMIN — TRAZODONE HYDROCHLORIDE 50 MG: 50 TABLET ORAL at 21:19

## 2023-12-14 RX ADMIN — BENZTROPINE MESYLATE 0.5 MG: 0.5 TABLET ORAL at 08:09

## 2023-12-14 NOTE — PROGRESS NOTES
12/13/23 1400   Activity/Group Checklist   Group Other (Comment)  (Group Art Therapy/Studio-Based)   Attendance Attended   Attendance Duration (min) Greater than 60  (90 min group)   Interactions Interacted appropriately   Affect/Mood Appropriate   Goals Achieved Discussed coping strategies;Able to listen to others;Able to engage in interactions;Able to recieve feedback;Able to give feedback to another  (Able to take risk and successfully challenge frustration and fear of failure.)

## 2023-12-14 NOTE — NURSING NOTE
Pt is calm and cooperative upon approach. Visible in the milieu socializing with peers, able to make needs known. Pt still reporting AH. Denies SI, HI, and pain. Lithium level obtained before HS dose. Compliant with HS medication and snack. Denies unmet needs/ concerns at this time.

## 2023-12-14 NOTE — PROGRESS NOTES
"Progress Note - Behavioral Health     Alberto Berumen 28 y.o. male MRN: [id number]   Unit/Bed#: -02 Encounter: [id number]    Behavior over the last 24 hours: unchanged.     Alberto is a 28 y.o male diagnosed with schizoaffective disorder (bipolar type) being seen today for psychiatric follow-up. As per staff the patient is continuing to complain of auditory hallucinations. Upon observation of the patient, he was in bed, wrapped in blanket and appeared tired. He was pleasant and cooperative throughout the interview but still remained scant and superficial in his responses. Still making minimal eye contact and had his eyes closed for most of the conversation. He reports worsening derogatory auditory hallucinations threatening to kill his family. States that these hallucinations are worse at night. Reports that the worsening hallucinations have been disruptive to his sleep. Discussed cross titration of Haldol and Zyprexa. Patient is agreeable. States he is not experiencing any side effects to his medications such as sedation. He currently denies SI/HI/VH.     Sleep: slept off and on, difficulty due to voices  Appetite: normal  Medication side effects: No   ROS: no complaints, all other systems are negative    Mental Status Evaluation:    Appearance:  marginal hygiene, looks stated age, wrapped in a blanket   Behavior:  pleasant, cooperative, calm, guarded   Speech:  normal rate and volume, clear, coherent, scant   Mood:  euthymic, \"I'm doing okay\"    Affect:  blunted, mood-incongruent   Thought Process:  logical, coherent, goal directed, linear   Associations: intact associations   Thought Content:  no overt delusions, appears less paranoid    Perceptual Disturbances: auditory hallucinations threatening to kill his family, denies visual hallucinations when asked   Risk Potential: Suicidal ideation - None at present  Homicidal ideation - None at present  Potential for aggression - No   Sensorium:  oriented to " person and place   Memory:  recent and remote memory grossly intact   Consciousness:  alert and awake   Attention/Concentration: attention span and concentration are age appropriate   Insight:  limited   Judgment: limited   Gait/Station: in bed, unable to assess   Motor Activity: no abnormal movements     Vital signs in last 24 hours:    Temp:  [97.5 °F (36.4 °C)-98.9 °F (37.2 °C)] 97.5 °F (36.4 °C)  HR:  [101-104] 101  Resp:  [18] 18  BP: (149-154)/(82-86) 149/82    Laboratory results: I have personally reviewed all pertinent laboratory/tests results    Results from the past 24 hours:   Recent Results (from the past 24 hour(s))   Lithium level    Collection Time: 12/13/23  8:53 PM   Result Value Ref Range    Lithium Lvl 0.6 0.6 - 1.2 mmol/L     Most Recent Labs:   Lab Results   Component Value Date    WBC 10.15 11/15/2023    RBC 5.32 11/15/2023    HGB 12.8 11/15/2023    HCT 42.4 11/15/2023     11/15/2023    RDW 14.0 11/15/2023    NEUTROABS 6.25 11/15/2023    SODIUM 135 12/12/2023    K 4.0 12/12/2023     12/12/2023    CO2 27 12/12/2023    BUN 11 12/12/2023    CREATININE 0.87 12/12/2023    GLUC 115 12/12/2023    CALCIUM 9.2 12/12/2023    AST 22 11/15/2023    ALT 40 11/15/2023    ALKPHOS 69 11/15/2023    TP 6.5 11/15/2023    ALB 4.1 11/15/2023    TBILI 0.45 11/15/2023    CHOLESTEROL 169 11/15/2023    HDL 37 (L) 11/15/2023    TRIG 117 11/15/2023    LDLCALC 109 (H) 11/15/2023    NONHDLC 132 11/15/2023    LITHIUM 0.6 12/13/2023    [id number] 1.062 11/15/2023    SYPHILISAB Non-reactive 11/18/2023    HGBA1C 4.9 11/15/2023    EAG 94 11/15/2023       Progress Toward Goals: progressing slowly    Assessment/Plan   Principal Problem:    Schizoaffective disorder, bipolar type (HCC)  Active Problems:    GERD (gastroesophageal reflux disease)    Medical clearance for psychiatric admission    Tobacco abuse    T wave inversion in EKG    Chronic idiopathic constipation    Vitamin B 12 deficiency    Vitamin D  deficiency      Recommended Treatment:     Planned medication and treatment changes:    All current active medications have been reviewed  Encourage group therapy, milieu therapy and occupational therapy  Behavioral Health checks every 7 minutes    Continue all other medications  Continue cross titration between Haldol and Zyprexa, increase Zyprexa today from 20 mg PO QHS to 25 mg PO QHS. Haldol will be reduced tomorrow 12/15/23 to 5mg BID  Labs reviewed  DC planning ongoing - continues to require inpatient hospitalization due to ongoing AH    Current Facility-Administered Medications   Medication Dose Route Frequency Provider Last Rate    acetaminophen  650 mg Oral Q6H PRN Yasmin Harvey MD      acetaminophen  650 mg Oral Q4H PRN Yasmin Harvey MD      acetaminophen  975 mg Oral Q6H PRN Yasmin Harvey MD      aluminum-magnesium hydroxide-simethicone  30 mL Oral Q4H PRN Yasmin Harvey MD      haloperidol lactate  2.5 mg Intramuscular Q4H PRN Max 4/day Yasmin Harvey MD      And    LORazepam  1 mg Intramuscular Q4H PRN Max 4/day Yasmin Harvey MD      And    benztropine  0.5 mg Intramuscular Q4H PRN Max 4/day Yasmin Harvey MD      haloperidol lactate  5 mg Intramuscular Q4H PRN Max 4/day Yasmin Harvey MD      And    LORazepam  2 mg Intramuscular Q4H PRN Max 4/day Yasmin Harvey MD      And    benztropine  1 mg Intramuscular Q4H PRN Max 4/day Yasmin Harvey MD      benztropine  0.5 mg Oral BID La Flores MD      benztropine  1 mg Oral Q4H PRN Max 6/day Yasmin Harvey MD      bisacodyl  10 mg Rectal Daily PRN Yasmin Harvey MD      cholecalciferol  2,000 Units Oral Daily HOLLI Monge      cyanocobalamin  1,000 mcg Oral Daily HOLLI Monge      hydrOXYzine HCL  50 mg Oral Q6H PRN Max 4/day Yasmin Harvey MD      Or    diphenhydrAMINE  50 mg Intramuscular Q6H PRN Yasmin Harvey MD      escitalopram  20 mg Oral Daily La Flores MD       haloperidol  1 mg Oral Q6H PRN Yasmin Harvey MD      haloperidol  10 mg Oral QPM HOLLI Anderson      haloperidol  2.5 mg Oral Q4H PRN Max 4/day Yasmin Harvey MD      haloperidol  5 mg Oral Q4H PRN Max 4/day Yasmin Harvey MD      haloperidol  5 mg Oral Daily Verónica Jo PA-C      hydrOXYzine HCL  100 mg Oral Q6H PRN Max 4/day Yasmin Harvey MD      Or    LORazepam  2 mg Intramuscular Q6H PRN Yasmin Harvey MD      hydrOXYzine HCL  25 mg Oral Q6H PRN Max 4/day Yasmin Harvey MD      lithium carbonate  1,200 mg Oral HS HOLLI Anderson      lithium carbonate  300 mg Oral Daily HOLLI Anderson      melatonin  3 mg Oral HS Yasmin Harvey MD      mirtazapine  45 mg Oral HS La Flores MD      nicotine polacrilex  4 mg Oral Q2H PRN Yasmin Harvey MD      OLANZapine  10 mg Oral Daily HOLLI Anderson      OLANZapine  20 mg Oral HS La Flores MD      polyethylene glycol  17 g Oral Daily PRN Yasmin Harvey MD      polyethylene glycol  17 g Oral Daily HOLLI Monge      senna-docusate sodium  1 tablet Oral Daily PRN Yasmin Harvey MD      traZODone  50 mg Oral HS PRN Yasmin Harvey MD       Risks / Benefits of Treatment:    Risks, benefits, and possible side effects of medications explained to patient and patient verbalizes understanding and agreement for treatment.    Counseling / Coordination of Care:    Patient's progress discussed with staff in treatment team meeting.  Medications, treatment progress and treatment plan reviewed with patient.    Verónica Jo PA-C 12/14/23

## 2023-12-14 NOTE — PROGRESS NOTES
"Progress Note - Behavioral Health   Alberto Berumen 28 y.o. male MRN: [id number]  Unit/Bed#: -02 Encounter: [id number]    Behavior over the last 24 hours:  unchanged.   Sleep: AH worse at night  Appetite: normal  Medication side effects: No  ROS: no complaints    Mental Status Evaluation:  Appearance:  casually dressed   Behavior:  cooperative   Speech:  normal volume   Mood:  \"Ok\"   Affect:  constricted   Thought Process:  logical   Thought Content:  Fearful harm will come to family   Perceptual Disturbances: AH that tell him he and his family will die   Risk Potential: Suicidal Ideations none and Homicidal Ideations none   Sensorium:  person, place, and situation   Cognition:  recent and remote memory grossly intact   Consciousness:  alert and awake    Attention: attention span appeared shorter than expected for age   Insight:  limited   Judgment: limited   Gait/Station: normal gait/station   Motor Activity: no abnormal movements       Wt Readings from Last 3 Encounters:   12/10/23 110 kg (243 lb)   11/14/23 103 kg (226 lb)   07/24/23 103 kg (226 lb 12.8 oz)     Temp Readings from Last 3 Encounters:   12/16/23 98 °F (36.7 °C) (Temporal)   11/14/23 98.4 °F (36.9 °C) (Oral)   08/15/23 98.1 °F (36.7 °C) (Tympanic)     BP Readings from Last 3 Encounters:   12/16/23 140/91   11/14/23 121/78   08/15/23 121/70     Pulse Readings from Last 3 Encounters:   12/16/23 102   11/14/23 74   08/15/23 72       Progress Toward Goals: Per RN report he has been out more on the unit since zyprexa increased and he still reports worse AH at night that tell him he will die and his family will die. The AH get louder at night when he is not able to distract himself.    Assessment/Plan   Principal Problem:    Schizoaffective disorder, bipolar type (HCC)  Active Problems:    GERD (gastroesophageal reflux disease)    Medical clearance for psychiatric admission    Tobacco abuse    T wave inversion in EKG    Chronic idiopathic " constipation    Vitamin B 12 deficiency    Vitamin D deficiency      Recommended Treatment: Continue with group therapy, milieu therapy and occupational therapy.      Risks, benefits and possible side effects of Medications:   Risks, benefits, and possible side effects of medications explained to patient and patient verbalizes understanding.      Medications: Crosstaper of haldol to zyprexa, will plan on reducing haldol to 5mg qday on 12/17.      all current active meds have been reviewed and continue current psychiatric medications.    Labs:  Reviewed  Admission on 11/14/2023   Component Date Value    Clarity, UA 11/15/2023 Clear     Color, UA 11/15/2023 Agatha (A)     Specific [place], UA 11/15/2023 1.020     pH, UA 11/15/2023 6.0     Glucose, UA 11/15/2023 Negative     Ketones, UA 11/15/2023 Negative     Occult Blood, UA 11/15/2023 Negative     Protein, UA 11/15/2023 15 (Trace) (A)     Nitrite, UA 11/15/2023 Negative     Bilirubin, UA 11/15/2023 Negative     Leukocytes, UA 11/15/2023 25.0 (A)     WBC, UA 11/15/2023 2-4     RBC, UA 11/15/2023 None Seen     Bacteria, UA 11/15/2023 Occasional     Epithelial Cells 11/15/2023 Occasional     MUCUS THREADS 11/15/2023 Occasional (A)     UROBILINOGEN UA 11/15/2023 4.0 (A)     Sodium 11/15/2023 139     Potassium 11/15/2023 3.9     Chloride 11/15/2023 105     CO2 11/15/2023 26     ANION GAP 11/15/2023 8     BUN 11/15/2023 8     Creatinine 11/15/2023 0.92     Glucose 11/15/2023 97     Glucose, Fasting 11/15/2023 97     Calcium 11/15/2023 9.1     AST 11/15/2023 22     ALT 11/15/2023 40     Alkaline Phosphatase 11/15/2023 69     Total Protein 11/15/2023 6.5     Albumin 11/15/2023 4.1     Total Bilirubin 11/15/2023 0.45     eGFR 11/15/2023 113     WBC 11/15/2023 10.15     RBC 11/15/2023 5.32     Hemoglobin 11/15/2023 12.8     Hematocrit 11/15/2023 42.4     MCV 11/15/2023 80 (L)     MCH 11/15/2023 24.1 (L)     MCHC 11/15/2023 30.2 (L)     RDW 11/15/2023 14.0     MPV 11/15/2023  10.6     Platelets 11/15/2023 252     nRBC 11/15/2023 0     Neutrophils Relative 11/15/2023 62     Immat GRANS % 11/15/2023 0     Lymphocytes Relative 11/15/2023 27     Monocytes Relative 11/15/2023 8     Eosinophils Relative 11/15/2023 2     Basophils Relative 11/15/2023 1     Neutrophils Absolute 11/15/2023 6.25     Immature Grans Absolute 11/15/2023 0.03     Lymphocytes Absolute 11/15/2023 2.73     Monocytes Absolute 11/15/2023 0.85     Eosinophils Absolute 11/15/2023 0.24     Basophils Absolute 11/15/2023 0.05     TSH 3RD GENERATON 11/15/2023 1.062     Vitamin B-12 11/15/2023 219     Folate 11/15/2023 7.8     Vit D, 25-Hydroxy 11/15/2023 7.6 (L)     Cholesterol 11/15/2023 169     Triglycerides 11/15/2023 117     HDL, Direct 11/15/2023 37 (L)     LDL Calculated 11/15/2023 109 (H)     Non-HDL-Chol (CHOL-HDL) 11/15/2023 132     Hemoglobin A1C 11/15/2023 4.9     EAG 11/15/2023 94     Iron Saturation 11/15/2023 24     TIBC 11/15/2023 302     Iron 11/15/2023 71     UIBC 11/15/2023 231     Ferritin 11/15/2023 56     Ventricular Rate 11/15/2023 62     Atrial Rate 11/15/2023 62     KS Interval 11/15/2023 142     QRSD Interval 11/15/2023 92     QT Interval 11/15/2023 392     QTC Interval 11/15/2023 397     P Axis 11/15/2023 49     QRS Axis 11/15/2023 45     T Wave [place] 11/15/2023 18     Lithium Lvl 11/18/2023 0.3 (L)     Sodium 11/18/2023 139     Potassium 11/18/2023 4.3     Chloride 11/18/2023 104     CO2 11/18/2023 26     ANION GAP 11/18/2023 9     BUN 11/18/2023 10     Creatinine 11/18/2023 1.07     Glucose 11/18/2023 119     Calcium 11/18/2023 9.7     eGFR 11/18/2023 94     Syphilis Total Antibody 11/18/2023 Non-reactive     Sodium 11/24/2023 136     Potassium 11/24/2023 4.6     Chloride 11/24/2023 100     CO2 11/24/2023 26     ANION GAP 11/24/2023 10     BUN 11/24/2023 11     Creatinine 11/24/2023 1.03     Glucose 11/24/2023 140     Calcium 11/24/2023 10.1     eGFR 11/24/2023 99     Lithium Lvl 11/24/2023 0.6      Lithium Lvl 11/26/2023 0.6     Lithium Lvl 11/30/2023 0.6     Sodium 11/30/2023 136     Potassium 11/30/2023 4.2     Chloride 11/30/2023 99     CO2 11/30/2023 27     ANION GAP 11/30/2023 10     BUN 11/30/2023 11     Creatinine 11/30/2023 0.91     Glucose 11/30/2023 143 (H)     Calcium 11/30/2023 10.1     eGFR 11/30/2023 115     [place] Lvl 12/05/2023 0.6     Ventricular Rate 12/08/2023 103     Atrial Rate 12/08/2023 103     TN Interval 12/08/2023 142     QRSD Interval 12/08/2023 86     QT Interval 12/08/2023 330     QTC Interval 12/08/2023 432     P Axis 12/08/2023 40     QRS [place] 12/08/2023 48     T Wave [place] 12/08/2023 -22     Ventricular Rate 12/08/2023 104     Atrial Rate 12/08/2023 104     TN Interval 12/08/2023 142     QRSD Interval 12/08/2023 80     QT Interval 12/08/2023 322     QTC Interval 12/08/2023 423     P Axis 12/08/2023 39     QRS [place] 12/08/2023 47     T Wave [place] 12/08/2023 -30     Ventricular Rate 12/08/2023 106     Atrial Rate 12/08/2023 106     TN Interval 12/08/2023 140     QRSD Interval 12/08/2023 84     QT Interval 12/08/2023 328     QTC Interval 12/08/2023 435     P Axis 12/08/2023 39     QRS Axis 12/08/2023 52     T Wave [place] 12/08/2023 -13     Lithium Lvl 12/09/2023 0.8     Sodium 12/09/2023 132 (L)     Potassium 12/09/2023 4.2     Chloride 12/09/2023 95 (L)     CO2 12/09/2023 27     ANION GAP 12/09/2023 10     BUN 12/09/2023 12     Creatinine 12/09/2023 1.07     Glucose 12/09/2023 153 (H)     Calcium 12/09/2023 10.1     eGFR 12/09/2023 94     Sodium 12/12/2023 135     Potassium 12/12/2023 4.0     Chloride 12/12/2023 101     CO2 12/12/2023 27     ANION GAP 12/12/2023 7     BUN 12/12/2023 11     Creatinine 12/12/2023 0.87     Glucose 12/12/2023 115     Glucose, Fasting 12/12/2023 115 (H)     Calcium 12/12/2023 9.2     eGFR 12/12/2023 118     [place] Lvl 12/13/2023 0.6        Counseling / Coordination of Care  Total floor / unit time spent today 20 minutes. Greater than 50% of total time was  spent with the patient and / or family counseling and / or coordination of care. A description of the counseling / coordination of care: medications, treatment progress and treatment plan reviewed with patient.

## 2023-12-14 NOTE — PROGRESS NOTES
12/14/23 1414   Team Meeting   Meeting Type Daily Rounds   Team Members Present   Team Members Present Physician;Nurse;   Physician Team Member Mark   Nursing Team Member Giancarlo   Care Management Team Member Miguelito   Patient/Family Present   Patient Present No   Patient's Family Present No     Patient currently holds 201 status. Patient is compliant with medications and meals. Patient has been isolative to room. Patient to take increased dose of Zyprexa at 20 mg HS and 10 mg daily. Patient to also continue on other scheduled medications. Patient discharge plan and date is to be determined.

## 2023-12-14 NOTE — NURSING NOTE
"Patient isolative to room. Patient is calm and cooperative upon approach. Patient reported AH of voices stating, \"Im going to kill you and your family.\" Patient verbalizes feeling safe on unit. Patient denies other psych symptoms. Patient is compliant with meds and meals  "

## 2023-12-15 LAB
ANION GAP SERPL CALCULATED.3IONS-SCNC: 7 MMOL/L
BUN SERPL-MCNC: 10 MG/DL (ref 5–25)
CALCIUM SERPL-MCNC: 9.6 MG/DL (ref 8.4–10.2)
CHLORIDE SERPL-SCNC: 101 MMOL/L (ref 96–108)
CHOLEST SERPL-MCNC: 182 MG/DL
CO2 SERPL-SCNC: 29 MMOL/L (ref 21–32)
CREAT SERPL-MCNC: 0.85 MG/DL (ref 0.6–1.3)
GFR SERPL CREATININE-BSD FRML MDRD: 119 ML/MIN/1.73SQ M
GLUCOSE P FAST SERPL-MCNC: 101 MG/DL (ref 65–99)
GLUCOSE SERPL-MCNC: 101 MG/DL (ref 65–140)
HDLC SERPL-MCNC: 56 MG/DL
LDLC SERPL CALC-MCNC: 102 MG/DL (ref 0–100)
NONHDLC SERPL-MCNC: 126 MG/DL
POTASSIUM SERPL-SCNC: 4.2 MMOL/L (ref 3.5–5.3)
SODIUM SERPL-SCNC: 137 MMOL/L (ref 135–147)
TRIGL SERPL-MCNC: 118 MG/DL

## 2023-12-15 PROCEDURE — 80048 BASIC METABOLIC PNL TOTAL CA: CPT

## 2023-12-15 PROCEDURE — 99233 SBSQ HOSP IP/OBS HIGH 50: CPT | Performed by: STUDENT IN AN ORGANIZED HEALTH CARE EDUCATION/TRAINING PROGRAM

## 2023-12-15 PROCEDURE — 80061 LIPID PANEL: CPT

## 2023-12-15 RX ORDER — OLANZAPINE 10 MG/1
30 TABLET ORAL
Status: DISCONTINUED | OUTPATIENT
Start: 2023-12-15 | End: 2023-12-26

## 2023-12-15 RX ADMIN — CYANOCOBALAMIN TAB 1000 MCG 1000 MCG: 1000 TAB at 08:48

## 2023-12-15 RX ADMIN — LITHIUM CARBONATE 300 MG: 300 TABLET, EXTENDED RELEASE ORAL at 08:48

## 2023-12-15 RX ADMIN — NICOTINE POLACRILEX 4 MG: 4 GUM, CHEWING ORAL at 18:13

## 2023-12-15 RX ADMIN — LITHIUM CARBONATE 1200 MG: 450 TABLET, EXTENDED RELEASE ORAL at 21:34

## 2023-12-15 RX ADMIN — BENZTROPINE MESYLATE 0.5 MG: 0.5 TABLET ORAL at 17:12

## 2023-12-15 RX ADMIN — NICOTINE POLACRILEX 4 MG: 4 GUM, CHEWING ORAL at 13:32

## 2023-12-15 RX ADMIN — BENZTROPINE MESYLATE 0.5 MG: 0.5 TABLET ORAL at 08:49

## 2023-12-15 RX ADMIN — OLANZAPINE 30 MG: 10 TABLET, FILM COATED ORAL at 21:34

## 2023-12-15 RX ADMIN — HALOPERIDOL 5 MG: 5 TABLET ORAL at 17:12

## 2023-12-15 RX ADMIN — POLYETHYLENE GLYCOL 3350 17 G: 17 POWDER, FOR SOLUTION ORAL at 08:48

## 2023-12-15 RX ADMIN — ESCITALOPRAM OXALATE 20 MG: 10 TABLET ORAL at 08:48

## 2023-12-15 RX ADMIN — MIRTAZAPINE 45 MG: 30 TABLET, FILM COATED ORAL at 21:34

## 2023-12-15 RX ADMIN — HALOPERIDOL 5 MG: 5 TABLET ORAL at 08:49

## 2023-12-15 RX ADMIN — CHOLECALCIFEROL TAB 25 MCG (1000 UNIT) 2000 UNITS: 25 TAB at 08:49

## 2023-12-15 RX ADMIN — MELATONIN TAB 3 MG 3 MG: 3 TAB at 21:34

## 2023-12-15 RX ADMIN — NICOTINE POLACRILEX 4 MG: 4 GUM, CHEWING ORAL at 08:48

## 2023-12-15 RX ADMIN — OLANZAPINE 10 MG: 10 TABLET, FILM COATED ORAL at 08:49

## 2023-12-15 NOTE — PROGRESS NOTES
"Progress Note - Behavioral Health     Alberto Berumen 28 y.o. male MRN: [id number]   Unit/Bed#: -02 Encounter: [id number]    Behavior over the last 24 hours: unchanged.     Alberto is a 28 y.o male diagnosed with schizoaffective disorder (bipolar type) being seen today for psychiatric follow-up. As per staff the patient is visible, and social on the unit but is now endorsing some anxiety due to his ongoing auditory hallucinations. Upon observation he is out of bed, dressed in casual clothes, present and interacting with peers and appears slightly brighter and more energetic. He is calm, cooperative and is now making good eye contact, but still remains guarded. Still reports difficulty sleeping at night due to increasing auditory hallucinations threatening to kill his family. Discussed continuing Haldol/Zyprexa cross titration, patient is agreeable to increase in Zyprexa tonight. Denies any adverse effects to medications as well as SI/HI/AH.     Sleep: slept off and on, difficulty due to voices  Appetite: normal  Medication side effects: No   ROS: no complaints, all other systems are negative    Mental Status Evaluation:    Appearance:  casually dressed, adequate grooming, looks stated age, overweight   Behavior:  pleasant, cooperative, calm, guarded   Speech:  normal rate and volume, clear, coherent   Mood:  euthymic, \"I'm doing okay\"    Affect:  blunted, mood-incongruent, slightly brighter   Thought Process:  logical, coherent, goal directed, linear   Associations: intact associations   Thought Content:  no overt delusions, appears less paranoid    Perceptual Disturbances: auditory hallucinations threatening to kill his family, denies visual hallucinations when asked   Risk Potential: Suicidal ideation - None at present  Homicidal ideation - None at present  Potential for aggression - No   Sensorium:  oriented to person and place   Memory:  recent and remote memory grossly intact   Consciousness:  alert and " awake   Attention/Concentration: attention span and concentration are age appropriate   Insight:  limited   Judgment: limited   Gait/Station: normal gait/station   Motor Activity: no abnormal movements     Vital signs in last 24 hours:    Temp:  [97.5 °F (36.4 °C)-97.8 °F (36.6 °C)] 97.5 °F (36.4 °C)  HR:  [] 93  Resp:  [16] 16  BP: (141-158)/(75-86) 141/86    Laboratory results: I have personally reviewed all pertinent laboratory/tests results    Results from the past 24 hours:   Recent Results (from the past 24 hour(s))   Basic metabolic panel    Collection Time: 12/15/23  6:59 AM   Result Value Ref Range    Sodium 137 135 - 147 mmol/L    Potassium 4.2 3.5 - 5.3 mmol/L    Chloride 101 96 - 108 mmol/L    CO2 29 21 - 32 mmol/L    ANION GAP 7 mmol/L    BUN 10 5 - 25 mg/dL    Creatinine 0.85 0.60 - 1.30 mg/dL    Glucose 101 65 - 140 mg/dL    Glucose, Fasting 101 (H) 65 - 99 mg/dL    Calcium 9.6 8.4 - 10.2 mg/dL    eGFR 119 ml/min/1.73sq m   Lipid panel    Collection Time: 12/15/23  6:59 AM   Result Value Ref Range    Cholesterol 182 See Comment mg/dL    Triglycerides 118 See Comment mg/dL    HDL, Direct 56 >=40 mg/dL    LDL Calculated 102 (H) 0 - 100 mg/dL    Non-HDL-Chol (CHOL-HDL) 126 mg/dl     Most Recent Labs:   Lab Results   Component Value Date    WBC 10.15 11/15/2023    RBC 5.32 11/15/2023    HGB 12.8 11/15/2023    HCT 42.4 11/15/2023     11/15/2023    RDW 14.0 11/15/2023    NEUTROABS 6.25 11/15/2023    SODIUM 137 12/15/2023    K 4.2 12/15/2023     12/15/2023    CO2 29 12/15/2023    BUN 10 12/15/2023    CREATININE 0.85 12/15/2023    GLUC 101 12/15/2023    CALCIUM 9.6 12/15/2023    AST 22 11/15/2023    ALT 40 11/15/2023    ALKPHOS 69 11/15/2023    TP 6.5 11/15/2023    ALB 4.1 11/15/2023    TBILI 0.45 11/15/2023    CHOLESTEROL 182 12/15/2023    HDL 56 12/15/2023    TRIG 118 12/15/2023    LDLCALC 102 (H) 12/15/2023    NONHDLC 126 12/15/2023    LITHIUM 0.6 12/13/2023    [id number] 1.062  11/15/2023    SYPHILISAB Non-reactive 11/18/2023    HGBA1C 4.9 11/15/2023    EAG 94 11/15/2023       Progress Toward Goals: progressing slowly    Assessment/Plan   Principal Problem:    Schizoaffective disorder, bipolar type (HCC)  Active Problems:    GERD (gastroesophageal reflux disease)    Medical clearance for psychiatric admission    Tobacco abuse    T wave inversion in EKG    Chronic idiopathic constipation    Vitamin B 12 deficiency    Vitamin D deficiency      Recommended Treatment:     Planned medication and treatment changes:    All current active medications have been reviewed  Encourage group therapy, milieu therapy and occupational therapy  Behavioral Health checks every 7 minutes    Continue cross titration with Haldol and Zyprexa, increase Zyprexa today from 25mg PO QHS to 30mg PO QHS; will continue haldol 5 mg BID for now.  Labs reviewed  Continue all other medications   DC panning ongoing - due to ongoing medication adjustments and auditory hallucinations    Current Facility-Administered Medications   Medication Dose Route Frequency Provider Last Rate    acetaminophen  650 mg Oral Q6H PRN Yasmin Harvey MD      acetaminophen  650 mg Oral Q4H PRN Yasmin Harvey MD      acetaminophen  975 mg Oral Q6H PRN Yasmin Harvey MD      aluminum-magnesium hydroxide-simethicone  30 mL Oral Q4H PRN Yasmin Harvey MD      haloperidol lactate  2.5 mg Intramuscular Q4H PRN Max 4/day Yasmin Harvey MD      And    LORazepam  1 mg Intramuscular Q4H PRN Max 4/day Yasmin Harvey MD      And    benztropine  0.5 mg Intramuscular Q4H PRN Max 4/day Yasmin Harvey MD      haloperidol lactate  5 mg Intramuscular Q4H PRN Max 4/day Yasmin Harvey MD      And    LORazepam  2 mg Intramuscular Q4H PRN Max 4/day Yasmin Harvey MD      And    benztropine  1 mg Intramuscular Q4H PRN Max 4/day Yasmin Harvey MD      benztropine  0.5 mg Oral BID La Flores MD      benztropine  1 mg Oral Q4H PRN Max  6/day Yasmin Harvey MD      bisacodyl  10 mg Rectal Daily PRN Yasmin Harvey MD      cholecalciferol  2,000 Units Oral Daily HOLLI Monge      cyanocobalamin  1,000 mcg Oral Daily HOLLI Monge      hydrOXYzine HCL  50 mg Oral Q6H PRN Max 4/day Yasmin Harvey MD      Or    diphenhydrAMINE  50 mg Intramuscular Q6H PRN Yasmin Harvey MD      escitalopram  20 mg Oral Daily La Flores MD      haloperidol  1 mg Oral Q6H PRN Yasmin Harvey MD      haloperidol  2.5 mg Oral Q4H PRN Max 4/day Yasmin Harvey MD      haloperidol  5 mg Oral Q4H PRN Max 4/day Yasmin Harvey MD      haloperidol  5 mg Oral BID Verónica Jo PA-C      hydrOXYzine HCL  100 mg Oral Q6H PRN Max 4/day Yasmin Harvey MD      Or    LORazepam  2 mg Intramuscular Q6H PRN Yasmin Harvey MD      hydrOXYzine HCL  25 mg Oral Q6H PRN Max 4/day Yasmin Harvey MD      lithium carbonate  1,200 mg Oral HS HOLLI Anderson      lithium carbonate  300 mg Oral Daily HOLLI Anderson      melatonin  3 mg Oral HS Yasmin Harvey MD      mirtazapine  45 mg Oral HS La Flores MD      nicotine polacrilex  4 mg Oral Q2H PRN Yasmin Harvey MD      OLANZapine  10 mg Oral Daily HOLLI Anderson      OLANZapine  25 mg Oral HS Verónica Jo PA-C      polyethylene glycol  17 g Oral Daily PRN Yasmin Harvey MD      polyethylene glycol  17 g Oral Daily HOLLI Monge      senna-docusate sodium  1 tablet Oral Daily PRN Yasmin Harvey MD      traZODone  50 mg Oral HS PRN Yasmin Harvey MD       Risks / Benefits of Treatment:    Risks, benefits, and possible side effects of medications explained to patient and patient verbalizes understanding and agreement for treatment.    Counseling / Coordination of Care:    Patient's progress discussed with staff in treatment team meeting.  Medications, treatment progress and treatment plan reviewed with patient.    Verónica  Bhakti Jo PA-C 12/15/23

## 2023-12-15 NOTE — PROGRESS NOTES
12/15/23 6192   Team Meeting   Meeting Type Daily Rounds   Team Members Present   Team Members Present Physician;Nurse;   Physician Team Member Vin   Nursing Team Member [facility] Team Member Miguelito   Patient/Family Present   Patient Present No   Patient's Family Present No     Patient currently holds 201 status. Patient denies all but confirms continued AH. Patient is compliant with medications and meals. Patient to take increased dose of Zyprexa at 30 mg HS and 10 mg daily. Patient to also continue on other scheduled medications. Patient discharge plan and date is to be determined.

## 2023-12-15 NOTE — NURSING NOTE
"Patient is visible on unit, sitting in dayroom and socializing with peers. Patient reports hearing voices saying \"they are going to kill him and his family\". Patient denies SI/HI and reports feeling safe on unit. Patient reports mild anxiety and taking prescribed medication helps. Patient remains calm, cooperative and compliant with medications and meals. No complaints of pain or discomfort reported. Q 7 minute safety checks maintained.   "

## 2023-12-15 NOTE — NURSING NOTE
"Patient is visible in the dayroom, socializing with peers this morning. Patient is calm and cooperative upon approach. Patient reported AH. Voices are stating, \"Im going to kill you and your family.\" Patient verbalizes feeling safe on unit. Patient is compliant with meds and meals.   "

## 2023-12-16 PROCEDURE — 99232 SBSQ HOSP IP/OBS MODERATE 35: CPT | Performed by: PSYCHIATRY & NEUROLOGY

## 2023-12-16 RX ADMIN — HALOPERIDOL 5 MG: 5 TABLET ORAL at 17:47

## 2023-12-16 RX ADMIN — HALOPERIDOL 5 MG: 5 TABLET ORAL at 09:04

## 2023-12-16 RX ADMIN — BENZTROPINE MESYLATE 0.5 MG: 0.5 TABLET ORAL at 09:04

## 2023-12-16 RX ADMIN — LITHIUM CARBONATE 300 MG: 300 TABLET, EXTENDED RELEASE ORAL at 09:04

## 2023-12-16 RX ADMIN — CYANOCOBALAMIN TAB 1000 MCG 1000 MCG: 1000 TAB at 09:04

## 2023-12-16 RX ADMIN — OLANZAPINE 10 MG: 10 TABLET, FILM COATED ORAL at 09:04

## 2023-12-16 RX ADMIN — ESCITALOPRAM OXALATE 20 MG: 10 TABLET ORAL at 09:03

## 2023-12-16 RX ADMIN — LITHIUM CARBONATE 1200 MG: 450 TABLET, EXTENDED RELEASE ORAL at 21:15

## 2023-12-16 RX ADMIN — MIRTAZAPINE 45 MG: 30 TABLET, FILM COATED ORAL at 21:15

## 2023-12-16 RX ADMIN — OLANZAPINE 30 MG: 10 TABLET, FILM COATED ORAL at 21:15

## 2023-12-16 RX ADMIN — POLYETHYLENE GLYCOL 3350 17 G: 17 POWDER, FOR SOLUTION ORAL at 09:03

## 2023-12-16 RX ADMIN — NICOTINE POLACRILEX 4 MG: 4 GUM, CHEWING ORAL at 09:04

## 2023-12-16 RX ADMIN — BENZTROPINE MESYLATE 0.5 MG: 0.5 TABLET ORAL at 17:47

## 2023-12-16 RX ADMIN — CHOLECALCIFEROL TAB 25 MCG (1000 UNIT) 2000 UNITS: 25 TAB at 09:03

## 2023-12-16 RX ADMIN — NICOTINE POLACRILEX 4 MG: 4 GUM, CHEWING ORAL at 17:47

## 2023-12-16 RX ADMIN — MELATONIN TAB 3 MG 3 MG: 3 TAB at 21:15

## 2023-12-16 NOTE — NURSING NOTE
"Patient is calm and cooperative upon approach. Patient is visible at times on unit, socializing with peers. Patient reports AH, voices are saying \"Im going to kill you and your family.\" Patient verbalizes feeling safe on unit. Patient is compliant with meds and meals.   "

## 2023-12-16 NOTE — PROGRESS NOTES
Progress Note - Behavioral Health   Alberto Berumen 28 y.o. male MRN: [id number]  Unit/Bed#: -02 Encounter: [id number]    Behavior over the last 24 hours:  unchanged.   Sleep: hypersomnia  Appetite: normal  Medication side effects: No  ROS: no complaints    Mental Status Evaluation:  Appearance:  casually dressed   Behavior:  cooperative   Speech:  minimal   Mood:  normal   Affect:  flat   Thought Process:  normal   Thought Content:  normal   Perceptual Disturbances: Threatening Ah persist   Risk Potential: No Si or HI expressed   Sensorium:  person, place, and situation   Cognition:  recent and remote memory grossly intact   Consciousness:  somnolent    Attention: attention span appeared shorter than expected for age   Insight:  fair   Judgment: fair   Gait/Station: normal gait/station   Motor Activity: no abnormal movements     Progress Toward Goals: Per RN report he has been cooperative, out on the unit. Snoring on first approach and sleeping soundly after breakfast. Will plan to decrease haldol today to 5mg daily.     Wt Readings from Last 3 Encounters:   12/17/23 111 kg (245 lb 9.6 oz)   11/14/23 103 kg (226 lb)   07/24/23 103 kg (226 lb 12.8 oz)     Temp Readings from Last 3 Encounters:   12/17/23 (!) 97.4 °F (36.3 °C) (Temporal)   11/14/23 98.4 °F (36.9 °C) (Oral)   08/15/23 98.1 °F (36.7 °C) (Tympanic)     BP Readings from Last 3 Encounters:   12/17/23 136/82   11/14/23 121/78   08/15/23 121/70     Pulse Readings from Last 3 Encounters:   12/17/23 103   11/14/23 74   08/15/23 72         Assessment/Plan   Principal Problem:    Schizoaffective disorder, bipolar type (HCC)  Active Problems:    GERD (gastroesophageal reflux disease)    Medical clearance for psychiatric admission    Tobacco abuse    T wave inversion in EKG    Chronic idiopathic constipation    Vitamin B 12 deficiency    Vitamin D deficiency      Recommended Treatment: Continue with group therapy, milieu therapy and occupational therapy.       Risks, benefits and possible side effects of Medications:   Risks, benefits, and possible side effects of medications explained to patient and patient verbalizes understanding.      Medications:       all current active meds have been reviewed and current meds:   Current Facility-Administered Medications   Medication Dose Route Frequency    acetaminophen (TYLENOL) tablet 650 mg  650 mg Oral Q6H PRN    acetaminophen (TYLENOL) tablet 650 mg  650 mg Oral Q4H PRN    acetaminophen (TYLENOL) tablet 975 mg  975 mg Oral Q6H PRN    aluminum-magnesium hydroxide-simethicone (MAALOX) oral suspension 30 mL  30 mL Oral Q4H PRN    haloperidol lactate (HALDOL) injection 2.5 mg  2.5 mg Intramuscular Q4H PRN Max 4/day    And    LORazepam (ATIVAN) injection 1 mg  1 mg Intramuscular Q4H PRN Max 4/day    And    benztropine (COGENTIN) injection 0.5 mg  0.5 mg Intramuscular Q4H PRN Max 4/day    haloperidol lactate (HALDOL) injection 5 mg  5 mg Intramuscular Q4H PRN Max 4/day    And    LORazepam (ATIVAN) injection 2 mg  2 mg Intramuscular Q4H PRN Max 4/day    And    benztropine (COGENTIN) injection 1 mg  1 mg Intramuscular Q4H PRN Max 4/day    benztropine (COGENTIN) tablet 0.5 mg  0.5 mg Oral BID    benztropine (COGENTIN) tablet 1 mg  1 mg Oral Q4H PRN Max 6/day    bisacodyl (DULCOLAX) rectal suppository 10 mg  10 mg Rectal Daily PRN    cholecalciferol (VITAMIN D3) tablet 2,000 Units  2,000 Units Oral Daily    cyanocobalamin (VITAMIN B-12) tablet 1,000 mcg  1,000 mcg Oral Daily    hydrOXYzine HCL (ATARAX) tablet 50 mg  50 mg Oral Q6H PRN Max 4/day    Or    diphenhydrAMINE (BENADRYL) injection 50 mg  50 mg Intramuscular Q6H PRN    escitalopram (LEXAPRO) tablet 20 mg  20 mg Oral Daily    haloperidol (HALDOL) tablet 1 mg  1 mg Oral Q6H PRN    haloperidol (HALDOL) tablet 2.5 mg  2.5 mg Oral Q4H PRN Max 4/day    haloperidol (HALDOL) tablet 5 mg  5 mg Oral Q4H PRN Max 4/day    [START ON 12/18/2023] haloperidol (HALDOL) tablet 5 mg  5 mg Oral  Daily    hydrOXYzine HCL (ATARAX) tablet 100 mg  100 mg Oral Q6H PRN Max 4/day    Or    LORazepam (ATIVAN) injection 2 mg  2 mg Intramuscular Q6H PRN    hydrOXYzine HCL (ATARAX) tablet 25 mg  25 mg Oral Q6H PRN Max 4/day    lithium carbonate (LITHOBID) CR tablet 1,200 mg  1,200 mg Oral HS    lithium carbonate (LITHOBID) CR tablet 300 mg  300 mg Oral Daily    melatonin tablet 3 mg  3 mg Oral HS    mirtazapine (REMERON) tablet 45 mg  45 mg Oral HS    nicotine polacrilex (NICORETTE) gum 4 mg  4 mg Oral Q2H PRN    OLANZapine (ZyPREXA) tablet 10 mg  10 mg Oral Daily    OLANZapine (ZyPREXA) tablet 30 mg  30 mg Oral HS    polyethylene glycol (MIRALAX) packet 17 g  17 g Oral Daily PRN    polyethylene glycol (MIRALAX) packet 17 g  17 g Oral Daily    senna-docusate sodium (SENOKOT S) 8.6-50 mg per tablet 1 tablet  1 tablet Oral Daily PRN    traZODone (DESYREL) tablet 50 mg  50 mg Oral HS PRN   .    Labs:  Reviewed  Admission on 11/14/2023   Component Date Value    Clarity, UA 11/15/2023 Clear     Color, UA 11/15/2023 Agatha (A)     Specific [place], UA 11/15/2023 1.020     pH, UA 11/15/2023 6.0     Glucose, UA 11/15/2023 Negative     Ketones, UA 11/15/2023 Negative     Occult Blood, UA 11/15/2023 Negative     Protein, UA 11/15/2023 15 (Trace) (A)     Nitrite, UA 11/15/2023 Negative     Bilirubin, UA 11/15/2023 Negative     Leukocytes, UA 11/15/2023 25.0 (A)     WBC, UA 11/15/2023 2-4     RBC, UA 11/15/2023 None Seen     Bacteria, UA 11/15/2023 Occasional     Epithelial Cells 11/15/2023 Occasional     MUCUS THREADS 11/15/2023 Occasional (A)     UROBILINOGEN UA 11/15/2023 4.0 (A)     Sodium 11/15/2023 139     Potassium 11/15/2023 3.9     Chloride 11/15/2023 105     CO2 11/15/2023 26     ANION GAP 11/15/2023 8     BUN 11/15/2023 8     Creatinine 11/15/2023 0.92     Glucose 11/15/2023 97     Glucose, Fasting 11/15/2023 97     Calcium 11/15/2023 9.1     AST 11/15/2023 22     ALT 11/15/2023 40     Alkaline Phosphatase 11/15/2023 69      Total Protein 11/15/2023 6.5     Albumin 11/15/2023 4.1     Total Bilirubin 11/15/2023 0.45     eGFR 11/15/2023 113     WBC 11/15/2023 10.15     RBC 11/15/2023 5.32     Hemoglobin 11/15/2023 12.8     Hematocrit 11/15/2023 42.4     MCV 11/15/2023 80 (L)     MCH 11/15/2023 24.1 (L)     MCHC 11/15/2023 30.2 (L)     RDW 11/15/2023 14.0     MPV 11/15/2023 10.6     Platelets 11/15/2023 252     nRBC 11/15/2023 0     Neutrophils Relative 11/15/2023 62     Immat GRANS % 11/15/2023 0     Lymphocytes Relative 11/15/2023 27     Monocytes Relative 11/15/2023 8     Eosinophils Relative 11/15/2023 2     Basophils Relative 11/15/2023 1     Neutrophils Absolute 11/15/2023 6.25     Immature Grans Absolute 11/15/2023 0.03     Lymphocytes Absolute 11/15/2023 2.73     Monocytes Absolute 11/15/2023 0.85     Eosinophils Absolute 11/15/2023 0.24     Basophils Absolute 11/15/2023 0.05     TSH 3RD GENERATON 11/15/2023 1.062     Vitamin B-12 11/15/2023 219     Folate 11/15/2023 7.8     Vit D, 25-Hydroxy 11/15/2023 7.6 (L)     Cholesterol 11/15/2023 169     Triglycerides 11/15/2023 117     HDL, Direct 11/15/2023 37 (L)     LDL Calculated 11/15/2023 109 (H)     Non-HDL-Chol (CHOL-HDL) 11/15/2023 132     Hemoglobin A1C 11/15/2023 4.9     EAG 11/15/2023 94     Iron Saturation 11/15/2023 24     TIBC 11/15/2023 302     Iron 11/15/2023 71     UIBC 11/15/2023 231     Ferritin 11/15/2023 56     Ventricular Rate 11/15/2023 62     Atrial Rate 11/15/2023 62     ND Interval 11/15/2023 142     QRSD Interval 11/15/2023 92     QT Interval 11/15/2023 392     QTC Interval 11/15/2023 397     P Axis 11/15/2023 49     QRS Axis 11/15/2023 45     T Wave Harper 11/15/2023 18     Lithium Lvl 11/18/2023 0.3 (L)     Sodium 11/18/2023 139     Potassium 11/18/2023 4.3     Chloride 11/18/2023 104     CO2 11/18/2023 26     ANION GAP 11/18/2023 9     BUN 11/18/2023 10     Creatinine 11/18/2023 1.07     Glucose 11/18/2023 119     Calcium 11/18/2023 9.7     eGFR 11/18/2023  94     Syphilis Total Antibody 11/18/2023 Non-reactive     Sodium 11/24/2023 136     Potassium 11/24/2023 4.6     Chloride 11/24/2023 100     CO2 11/24/2023 26     ANION GAP 11/24/2023 10     BUN 11/24/2023 11     Creatinine 11/24/2023 1.03     Glucose 11/24/2023 140     Calcium 11/24/2023 10.1     eGFR 11/24/2023 99     Lithium Lvl 11/24/2023 0.6     Lithium Lvl 11/26/2023 0.6     Lithium Lvl 11/30/2023 0.6     Sodium 11/30/2023 136     Potassium 11/30/2023 4.2     Chloride 11/30/2023 99     CO2 11/30/2023 27     ANION GAP 11/30/2023 10     BUN 11/30/2023 11     Creatinine 11/30/2023 0.91     Glucose 11/30/2023 143 (H)     Calcium 11/30/2023 10.1     eGFR 11/30/2023 115     Senoia Lvl 12/05/2023 0.6     Ventricular Rate 12/08/2023 103     Atrial Rate 12/08/2023 103     WI Interval 12/08/2023 142     QRSD Interval 12/08/2023 86     QT Interval 12/08/2023 330     QTC Interval 12/08/2023 432     P Axis 12/08/2023 40     QRS Bridger 12/08/2023 48     T Wave [place] 12/08/2023 -22     Ventricular Rate 12/08/2023 104     Atrial Rate 12/08/2023 104     WI Interval 12/08/2023 142     QRSD Interval 12/08/2023 80     QT Interval 12/08/2023 322     QTC Interval 12/08/2023 423     P Axis 12/08/2023 39     QRS Bridger 12/08/2023 47     T Wave Bridger 12/08/2023 -30     Ventricular Rate 12/08/2023 106     Atrial Rate 12/08/2023 106     WI Interval 12/08/2023 140     QRSD Interval 12/08/2023 84     QT Interval 12/08/2023 328     QTC Interval 12/08/2023 435     P Axis 12/08/2023 39     QRS Axis 12/08/2023 52     T Wave Bridger 12/08/2023 -13     Lithium Lvl 12/09/2023 0.8     Sodium 12/09/2023 132 (L)     Potassium 12/09/2023 4.2     Chloride 12/09/2023 95 (L)     CO2 12/09/2023 27     ANION GAP 12/09/2023 10     BUN 12/09/2023 12     Creatinine 12/09/2023 1.07     Glucose 12/09/2023 153 (H)     Calcium 12/09/2023 10.1     eGFR 12/09/2023 94     Sodium 12/12/2023 135     Potassium 12/12/2023 4.0     Chloride 12/12/2023 101     CO2 12/12/2023 27      ANION GAP 12/12/2023 7     BUN 12/12/2023 11     Creatinine 12/12/2023 0.87     Glucose 12/12/2023 115     Glucose, Fasting 12/12/2023 115 (H)     Calcium 12/12/2023 9.2     eGFR 12/12/2023 118     [place] Lvl 12/13/2023 0.6     Sodium 12/15/2023 137     Potassium 12/15/2023 4.2     Chloride 12/15/2023 101     CO2 12/15/2023 29     ANION GAP 12/15/2023 7     BUN 12/15/2023 10     Creatinine 12/15/2023 0.85     Glucose 12/15/2023 101     Glucose, Fasting 12/15/2023 101 (H)     Calcium 12/15/2023 9.6     eGFR 12/15/2023 119     Cholesterol 12/15/2023 182     Triglycerides 12/15/2023 118     HDL, Direct 12/15/2023 56     LDL Calculated 12/15/2023 102 (H)     Non-HDL-Chol (CHOL-HDL) 12/15/2023 126        Counseling / Coordination of Care  Total floor / unit time spent today 15 minutes. Greater than 50% of total time was spent with the patient and / or family counseling and / or coordination of care. A description of the counseling / coordination of care: medications, treatment progress and treatment plan reviewed with patient.

## 2023-12-16 NOTE — NURSING NOTE
Patient calm and cooperative, medication compliant.     Patient denies psychiatric symptoms other than the ongoing auditory hallucinations regarding himself and his family.    Patient denies any unmet needs.

## 2023-12-17 PROCEDURE — 99232 SBSQ HOSP IP/OBS MODERATE 35: CPT | Performed by: PSYCHIATRY & NEUROLOGY

## 2023-12-17 RX ORDER — HALOPERIDOL 5 MG/1
5 TABLET ORAL DAILY
Status: DISCONTINUED | OUTPATIENT
Start: 2023-12-18 | End: 2023-12-19

## 2023-12-17 RX ADMIN — MIRTAZAPINE 45 MG: 30 TABLET, FILM COATED ORAL at 21:17

## 2023-12-17 RX ADMIN — OLANZAPINE 30 MG: 10 TABLET, FILM COATED ORAL at 21:18

## 2023-12-17 RX ADMIN — NICOTINE POLACRILEX 4 MG: 4 GUM, CHEWING ORAL at 13:03

## 2023-12-17 RX ADMIN — NICOTINE POLACRILEX 4 MG: 4 GUM, CHEWING ORAL at 17:03

## 2023-12-17 RX ADMIN — ESCITALOPRAM OXALATE 20 MG: 10 TABLET ORAL at 08:55

## 2023-12-17 RX ADMIN — POLYETHYLENE GLYCOL 3350 17 G: 17 POWDER, FOR SOLUTION ORAL at 08:56

## 2023-12-17 RX ADMIN — BENZTROPINE MESYLATE 0.5 MG: 0.5 TABLET ORAL at 17:03

## 2023-12-17 RX ADMIN — OLANZAPINE 10 MG: 10 TABLET, FILM COATED ORAL at 08:54

## 2023-12-17 RX ADMIN — MELATONIN TAB 3 MG 3 MG: 3 TAB at 21:17

## 2023-12-17 RX ADMIN — BENZTROPINE MESYLATE 0.5 MG: 0.5 TABLET ORAL at 08:56

## 2023-12-17 RX ADMIN — LITHIUM CARBONATE 1200 MG: 450 TABLET, EXTENDED RELEASE ORAL at 21:17

## 2023-12-17 RX ADMIN — LITHIUM CARBONATE 300 MG: 300 TABLET, EXTENDED RELEASE ORAL at 08:56

## 2023-12-17 RX ADMIN — NICOTINE POLACRILEX 4 MG: 4 GUM, CHEWING ORAL at 08:55

## 2023-12-17 RX ADMIN — CHOLECALCIFEROL TAB 25 MCG (1000 UNIT) 2000 UNITS: 25 TAB at 08:54

## 2023-12-17 RX ADMIN — HALOPERIDOL 5 MG: 5 TABLET ORAL at 08:55

## 2023-12-17 RX ADMIN — CYANOCOBALAMIN TAB 1000 MCG 1000 MCG: 1000 TAB at 08:54

## 2023-12-17 NOTE — NURSING NOTE
"Patient is calm and cooperative upon approach. Patient visible on unit at times. Patient continues to report AH of voices telling him, \"Im going to kill you and your family\". Patient verbalizes feeling safe on unit. Patient is compliant with meds and meals   "

## 2023-12-17 NOTE — NURSING NOTE
Patient calm and cooperative, medication compliant.     Patient endorses auditory hallucinations he has been experiencing, however denies other psychiatric symptoms at this time.

## 2023-12-18 PROCEDURE — 99232 SBSQ HOSP IP/OBS MODERATE 35: CPT | Performed by: STUDENT IN AN ORGANIZED HEALTH CARE EDUCATION/TRAINING PROGRAM

## 2023-12-18 RX ADMIN — LITHIUM CARBONATE 1200 MG: 450 TABLET, EXTENDED RELEASE ORAL at 21:10

## 2023-12-18 RX ADMIN — MIRTAZAPINE 45 MG: 30 TABLET, FILM COATED ORAL at 21:10

## 2023-12-18 RX ADMIN — POLYETHYLENE GLYCOL 3350 17 G: 17 POWDER, FOR SOLUTION ORAL at 08:06

## 2023-12-18 RX ADMIN — OLANZAPINE 10 MG: 10 TABLET, FILM COATED ORAL at 08:06

## 2023-12-18 RX ADMIN — LITHIUM CARBONATE 300 MG: 300 TABLET, EXTENDED RELEASE ORAL at 08:06

## 2023-12-18 RX ADMIN — BENZTROPINE MESYLATE 0.5 MG: 0.5 TABLET ORAL at 18:02

## 2023-12-18 RX ADMIN — CHOLECALCIFEROL TAB 25 MCG (1000 UNIT) 2000 UNITS: 25 TAB at 08:06

## 2023-12-18 RX ADMIN — HALOPERIDOL 5 MG: 5 TABLET ORAL at 08:06

## 2023-12-18 RX ADMIN — BENZTROPINE MESYLATE 0.5 MG: 0.5 TABLET ORAL at 08:06

## 2023-12-18 RX ADMIN — CYANOCOBALAMIN TAB 1000 MCG 1000 MCG: 1000 TAB at 08:06

## 2023-12-18 RX ADMIN — NICOTINE POLACRILEX 4 MG: 4 GUM, CHEWING ORAL at 18:03

## 2023-12-18 RX ADMIN — ESCITALOPRAM OXALATE 20 MG: 10 TABLET ORAL at 08:06

## 2023-12-18 RX ADMIN — OLANZAPINE 30 MG: 10 TABLET, FILM COATED ORAL at 21:11

## 2023-12-18 RX ADMIN — MELATONIN TAB 3 MG 3 MG: 3 TAB at 21:11

## 2023-12-18 NOTE — NURSING NOTE
Pt denies SI/HI/VH.  Pt states he continues to have the same AH of voices saying either he or his family will be murdered. Medication and meal compliant. Present briefly in dayroom for Breakfast. Scant/ with communication. Isolative to self. Pt keeps making snorting noises as if congested but denies any concerns at this time. No further concerns as of present. Plan of care ongoing.

## 2023-12-18 NOTE — TREATMENT TEAM
12/18/23 9130   Team Meeting   Meeting Type Tx Team Meeting   Team Members Present   Team Members Present Physician;Nurse;   Physician Team Member Jose Luis   Nursing Team Member Giancarlo   Care Management Team Member Miguelito   Patient/Family Present   Patient Present No   Patient's Family Present No     Staff reviewed treatment plan with patient. Patient was in agreement with treatment plan and signed.

## 2023-12-18 NOTE — PROGRESS NOTES
"Progress Note - Behavioral Health     Alberto Berumen 28 y.o. male MRN: [id number]   Unit/Bed#: -02 Encounter: [id number]    Behavior over the last 24 hours: unchanged.     Alberto is a 28 y.o male diagnosed with schizoaffective disorder (bipolar type) being seen today for psychiatric follow-up. Upon observation of the patient he was lying in bed, covered in a blanket and appeared tired. He is calm, cooperative and pleasant in conversation. Still remains guarded with minimal eye contact. Patient still complains of auditory hallucinations threatening to kill his family. Reports that the increase in Zyprexa has toned down the voices \"a very tiny bit\". He states that he took Risperdal in the past and it was helpful for him, discussed continuing cross titration of Zyprexa/Haldol and getting the medication to a therapeutic dose before potentially switching. He currently denies any side effects to the medications such as sedation. Denies any anxiety, depression or SI/HI/AH/VH.     Sleep: slept off and on, difficulty due to voices  Appetite: normal  Medication side effects: No   ROS: no complaints, all other systems are negative    Mental Status Evaluation:    Appearance:  casually dressed, adequate grooming, looks stated age, overweight   Behavior:  pleasant, cooperative, calm, guarded   Speech:  normal rate and volume, clear, coherent   Mood:  euthymic, \"okay\"   Affect:  blunted, mood-incongruent, slightly brighter   Thought Process:  logical, coherent, goal directed, linear   Associations: intact associations   Thought Content:  no overt delusions, no paranoia   Perceptual Disturbances: auditory hallucinations threatening to kill his family, denies visual hallucinations when asked   Risk Potential: Suicidal ideation - None at present  Homicidal ideation - None at present  Potential for aggression - No   Sensorium:  oriented to person and place   Memory:  recent and remote memory grossly intact   Consciousness:  " alert and awake   Attention/Concentration: attention span and concentration are age appropriate   Insight:  limited   Judgment: limited   Gait/Station: normal gait/station   Motor Activity: no abnormal movements     Vital signs in last 24 hours:    Temp:  [97.8 °F (36.6 °C)-97.9 °F (36.6 °C)] 97.8 °F (36.6 °C)  HR:  [98-99] 98  Resp:  [16] 16  BP: (124-140)/(79-81) 140/79    Laboratory results: I have personally reviewed all pertinent laboratory/tests results    Results from the past 24 hours:   No results found for this or any previous visit (from the past 24 hour(s)).    Most Recent Labs:   Lab Results   Component Value Date    WBC 10.15 11/15/2023    RBC 5.32 11/15/2023    HGB 12.8 11/15/2023    HCT 42.4 11/15/2023     11/15/2023    RDW 14.0 11/15/2023    NEUTROABS 6.25 11/15/2023    SODIUM 137 12/15/2023    K 4.2 12/15/2023     12/15/2023    CO2 29 12/15/2023    BUN 10 12/15/2023    CREATININE 0.85 12/15/2023    GLUC 101 12/15/2023    CALCIUM 9.6 12/15/2023    AST 22 11/15/2023    ALT 40 11/15/2023    ALKPHOS 69 11/15/2023    TP 6.5 11/15/2023    ALB 4.1 11/15/2023    TBILI 0.45 11/15/2023    CHOLESTEROL 182 12/15/2023    HDL 56 12/15/2023    TRIG 118 12/15/2023    LDLCALC 102 (H) 12/15/2023    NONHDLC 126 12/15/2023    LITHIUM 0.6 12/13/2023    [id number] 1.062 11/15/2023    SYPHILISAB Non-reactive 11/18/2023    HGBA1C 4.9 11/15/2023    EAG 94 11/15/2023       Progress Toward Goals: progressing slowly    Assessment/Plan   Principal Problem:    Schizoaffective disorder, bipolar type (HCC)  Active Problems:    GERD (gastroesophageal reflux disease)    Medical clearance for psychiatric admission    Tobacco abuse    T wave inversion in EKG    Chronic idiopathic constipation    Vitamin B 12 deficiency    Vitamin D deficiency      Recommended Treatment:     Planned medication and treatment changes:    All current active medications have been reviewed  Encourage group therapy, milieu therapy and  occupational therapy  Behavioral Health checks every 7 minutes    Continue cross titration with Haldol and Zyprexa - haldol reduced to 5 mg daily today  Continue all other medications  DC panning ongoing - still requires inpatient hospitalization due to medication adjustments and distressing voices    Current Facility-Administered Medications   Medication Dose Route Frequency Provider Last Rate    acetaminophen  650 mg Oral Q6H PRN Yasmin Harvey MD      acetaminophen  650 mg Oral Q4H PRN Yasmin Harvey MD      acetaminophen  975 mg Oral Q6H PRN Yasmin Harvey MD      aluminum-magnesium hydroxide-simethicone  30 mL Oral Q4H PRN Yasmin Harvey MD      haloperidol lactate  2.5 mg Intramuscular Q4H PRN Max 4/day Yasmin Harvey MD      And    LORazepam  1 mg Intramuscular Q4H PRN Max 4/day Yasmin Harvey MD      And    benztropine  0.5 mg Intramuscular Q4H PRN Max 4/day Yasmin Harvey MD      haloperidol lactate  5 mg Intramuscular Q4H PRN Max 4/day Yasmin Harvey MD      And    LORazepam  2 mg Intramuscular Q4H PRN Max 4/day Yasmin Harvey MD      And    benztropine  1 mg Intramuscular Q4H PRN Max 4/day Yasmin Harvey MD      benztropine  0.5 mg Oral BID La Flores MD      benztropine  1 mg Oral Q4H PRN Max 6/day Yasmin Harvey MD      bisacodyl  10 mg Rectal Daily PRN Yasmin Harvey MD      cholecalciferol  2,000 Units Oral Daily HOLLI Monge      cyanocobalamin  1,000 mcg Oral Daily HOLLI Monge      hydrOXYzine HCL  50 mg Oral Q6H PRN Max 4/day Yasmin Harvey MD      Or    diphenhydrAMINE  50 mg Intramuscular Q6H PRN Yasmin Harvey MD      escitalopram  20 mg Oral Daily La Flores MD      haloperidol  1 mg Oral Q6H PRN Yasmin Harvey MD      haloperidol  2.5 mg Oral Q4H PRN Max 4/day Yasmin Harvey MD      haloperidol  5 mg Oral Q4H PRN Max 4/day Yasmin Harvey MD      haloperidol  5 mg Oral Daily Eve Garrison,  MD      hydrOXYzine HCL  100 mg Oral Q6H PRN Max 4/day Yasmin Harvey MD      Or    LORazepam  2 mg Intramuscular Q6H PRN Yasmin Harvey MD      hydrOXYzine HCL  25 mg Oral Q6H PRN Max 4/day Yasmin Harvey MD      lithium carbonate  1,200 mg Oral HS Prisca Villafuerte, HOLLI      lithium carbonate  300 mg Oral Daily HOLLI Anderson      melatonin  3 mg Oral HS Yasmin Harvey MD      mirtazapine  45 mg Oral HS La Flores MD      nicotine polacrilex  4 mg Oral Q2H PRN Yasmin Harvey MD      OLANZapine  10 mg Oral Daily Prisca Villafuerte, HOLLI      OLANZapine  30 mg Oral HS Verónica oJ PA-C      polyethylene glycol  17 g Oral Daily PRN Yasmin Harvey MD      polyethylene glycol  17 g Oral Daily HOLLI Monge      senna-docusate sodium  1 tablet Oral Daily PRN Yasmin Harvey MD      traZODone  50 mg Oral HS PRN Yasmin Harvey MD       Risks / Benefits of Treatment:    Risks, benefits, and possible side effects of medications explained to patient and patient verbalizes understanding and agreement for treatment.    Counseling / Coordination of Care:    Patient's progress discussed with staff in treatment team meeting.  Medications, treatment progress and treatment plan reviewed with patient.    Verónica Jo PA-C 12/18/23

## 2023-12-18 NOTE — PROGRESS NOTES
12/18/23 1044   Team Meeting   Meeting Type Daily Rounds   Team Members Present   Team Members Present Physician;Nurse;   Physician Team Member Jose Luis   Nursing Team Member Giancarlo   Care Management Team Member Miguelito / Rabia   Patient/Family Present   Patient Present No   Patient's Family Present No     Patient currently holds 201 status. Patient denies VH/SI/HI but continue to report AH. Patient is medication and meal compliant. Patient to continue on 0.5 Cogentin BID, Lexapro 20 mg daily, Haldol 5 mg daily, lithium 1200 mg HS, lithium 300 mg daily, Remeron 45 mg HS, Zyprexa 10 mg daily and 30 mg HS. Patient discharge date and plan to be determined.

## 2023-12-18 NOTE — NURSING NOTE
Patient was visible in the dayroom most of the evening shift. Said they spoke to their family today and calls went well. Denied depression, anxiety, SI, and HI. Reports auditory hallucinations that they are going to hell and their family is going to die. Reassurance provided and patient encouraged to come to staff if voices worsen. Medication compliant. Denies any unmet needs or concerns at this time.

## 2023-12-19 PROCEDURE — 99232 SBSQ HOSP IP/OBS MODERATE 35: CPT | Performed by: PSYCHIATRY & NEUROLOGY

## 2023-12-19 RX ADMIN — CHOLECALCIFEROL TAB 25 MCG (1000 UNIT) 2000 UNITS: 25 TAB at 08:00

## 2023-12-19 RX ADMIN — OLANZAPINE 10 MG: 10 TABLET, FILM COATED ORAL at 08:00

## 2023-12-19 RX ADMIN — BENZTROPINE MESYLATE 0.5 MG: 0.5 TABLET ORAL at 17:39

## 2023-12-19 RX ADMIN — ESCITALOPRAM OXALATE 20 MG: 10 TABLET ORAL at 08:01

## 2023-12-19 RX ADMIN — POLYETHYLENE GLYCOL 3350 17 G: 17 POWDER, FOR SOLUTION ORAL at 08:01

## 2023-12-19 RX ADMIN — OLANZAPINE 30 MG: 10 TABLET, FILM COATED ORAL at 21:32

## 2023-12-19 RX ADMIN — LITHIUM CARBONATE 1200 MG: 450 TABLET, EXTENDED RELEASE ORAL at 21:32

## 2023-12-19 RX ADMIN — MELATONIN TAB 3 MG 3 MG: 3 TAB at 21:32

## 2023-12-19 RX ADMIN — NICOTINE POLACRILEX 4 MG: 4 GUM, CHEWING ORAL at 14:44

## 2023-12-19 RX ADMIN — HALOPERIDOL 5 MG: 5 TABLET ORAL at 08:00

## 2023-12-19 RX ADMIN — BENZTROPINE MESYLATE 0.5 MG: 0.5 TABLET ORAL at 08:01

## 2023-12-19 RX ADMIN — CYANOCOBALAMIN TAB 1000 MCG 1000 MCG: 1000 TAB at 08:01

## 2023-12-19 RX ADMIN — LITHIUM CARBONATE 300 MG: 300 TABLET, EXTENDED RELEASE ORAL at 08:00

## 2023-12-19 RX ADMIN — MIRTAZAPINE 45 MG: 30 TABLET, FILM COATED ORAL at 21:32

## 2023-12-19 NOTE — NURSING NOTE
Pt is calm and cooperative upon approach. Pt has been resting in bed most of the shift, able to make needs known. Denies SI, HI, visual hallucinations, and pain. Pt continues to report ongoing AH, feels safe to self and on the unit. Compliant with HS medication and snack. Denies unmet needs/ concerns at this time.

## 2023-12-19 NOTE — PROGRESS NOTES
"Progress Note - Behavioral Health     Alberto Berumen 28 y.o. male MRN: [id number]   Unit/Bed#: -02 Encounter: [id number]    Behavior over the last 24 hours: unchanged.     Alberto still reports auditory hallucinations of \"voices saying that I am going to hell and that they are going to harm my family\". He reports only minimal improvement in his symptoms on higher Zyprexa dose. He denies current suicidal or homicidal thoughts. Does not come to groups. Compliant with medications.    Sleep: hypersomnia  Appetite: normal  Medication side effects: No   ROS: no complaints, denies any shortness of breath, chest pain, or abdominal pain, all other systems are negative    Mental Status Evaluation:    Appearance:  disheveled   Behavior:  guarded, poor eye contact   Speech:  scant, soft   Mood:  dysphoric   Affect:  blunted   Thought Process:  concrete, poverty of thought   Associations: concrete associations   Thought Content:  some paranoia   Perceptual Disturbances: auditory hallucinations of \"voices saying that I am going to hell and that they are going to harm my family\", no visual hallucinations   Risk Potential: Suicidal ideation - None at present  Homicidal ideation - None at present  Potential for aggression - Not at present   Sensorium:  oriented to person, place, and time/date   Memory:  recent and remote memory grossly intact   Consciousness:  alert and awake   Attention/Concentration: poor concentration and poor attention span   Insight:  impaired   Judgment: impaired   Gait/Station: normal gait/station, normal balance   Motor Activity: no abnormal movements     Vital signs in last 24 hours:    Temp:  [97.6 °F (36.4 °C)-98.3 °F (36.8 °C)] 97.6 °F (36.4 °C)  HR:  [] 80  Resp:  [16-17] 16  BP: (139-142)/(77-81) 139/81    Laboratory results: I have personally reviewed all pertinent laboratory/tests results    BMP:   Lab Results   Component Value Date    SODIUM 137 12/15/2023    K 4.2 12/15/2023     " 12/15/2023    CO2 29 12/15/2023    AGAP 7 12/15/2023    BUN 10 12/15/2023    CREATININE 0.85 12/15/2023    GLUC 101 12/15/2023    GLUF 101 (H) 12/15/2023    CALCIUM 9.6 12/15/2023    EGFR 119 12/15/2023   Lipid Profile:   Lab Results   Component Value Date    CHOLESTEROL 182 12/15/2023    HDL 56 12/15/2023    TRIG 118 12/15/2023    LDLCALC 102 (H) 12/15/2023    NONHDLC 126 12/15/2023       Suicide/Homicide Risk Assessment:    Risk of Harm to Self:   Nursing Suicide Risk Assessment Last 24 hours: C-SSRS Risk (Since Last Contact)  Calculated C-SSRS Risk Score (Since Last Contact): No Risk Indicated  Current Specific Risk Factors include: mental illness diagnosis, current psychotic symptoms, presence of hallucinations  Protective Factors: no current suicidal ideation, ability to communicate with staff on the unit, taking medications as ordered on the unit  Based on today's assessment, Alberto presents the following risk of harm to self: low    Risk of Harm to Others:  Nursing Homicide Risk Assessment: Violence Risk to Others: Denies within past 6 months  Based on today's assessment, Alberto presents the following risk of harm to others: low    The following interventions are recommended: behavioral checks every 7 minutes, continued hospitalization on locked unit    Progress Toward Goals: no significant progress, denies current suicidal thoughts, still has psychotic symptoms    Assessment/Plan   Principal Problem:    Schizoaffective disorder, bipolar type (HCC)  Active Problems:    GERD (gastroesophageal reflux disease)    Medical clearance for psychiatric admission    Tobacco abuse    T wave inversion in EKG    Chronic idiopathic constipation    Vitamin B 12 deficiency    Vitamin D deficiency      Recommended Treatment:     Planned medication and treatment changes:    All current active medications have been reviewed  Encourage group therapy, milieu therapy and occupational therapy  Behavioral Health checks every 7  minutes  Continue current Zyprexa dose 10 mg daily and 30 mg at bedtime for treatment of psychosis and observe progress. Discussed with Clinical Pharmacy  Discontinue Haldol    Continue all other medications:    Current Facility-Administered Medications   Medication Dose Route Frequency Provider Last Rate    acetaminophen  650 mg Oral Q6H PRN Yasmin Harvey MD      acetaminophen  650 mg Oral Q4H PRN Yasmin Harvey MD      acetaminophen  975 mg Oral Q6H PRN Yasmin Harvey MD      aluminum-magnesium hydroxide-simethicone  30 mL Oral Q4H PRN Yasmin Harvey MD      haloperidol lactate  2.5 mg Intramuscular Q4H PRN Max 4/day Yasmin Harvey MD      And    LORazepam  1 mg Intramuscular Q4H PRN Max 4/day Yasmin Harvey MD      And    benztropine  0.5 mg Intramuscular Q4H PRN Max 4/day Yasmin Harvey MD      haloperidol lactate  5 mg Intramuscular Q4H PRN Max 4/day Yasmin Harvey MD      And    LORazepam  2 mg Intramuscular Q4H PRN Max 4/day Yasmin Harvey MD      And    benztropine  1 mg Intramuscular Q4H PRN Max 4/day Yasmin Harvey MD      benztropine  0.5 mg Oral BID La Flores MD      benztropine  1 mg Oral Q4H PRN Max 6/day Yasmin Harvey MD      bisacodyl  10 mg Rectal Daily PRN Yasmin Harvey MD      cholecalciferol  2,000 Units Oral Daily HOLLI Monge      cyanocobalamin  1,000 mcg Oral Daily HOLLI Monge      hydrOXYzine HCL  50 mg Oral Q6H PRN Max 4/day Yasmin Harvey MD      Or    diphenhydrAMINE  50 mg Intramuscular Q6H PRN Yasmin Harvey MD      escitalopram  20 mg Oral Daily La Flores MD      haloperidol  1 mg Oral Q6H PRN Yasmin Harvey MD      haloperidol  2.5 mg Oral Q4H PRN Max 4/day Yasmin Harvey MD      haloperidol  5 mg Oral Q4H PRN Max 4/day Yasmin Harvey MD      hydrOXYzine HCL  100 mg Oral Q6H PRN Max 4/day Yasmin Harvey MD      Or    LORazepam  2 mg Intramuscular Q6H PRN Yasmin Harvey MD       hydrOXYzine HCL  25 mg Oral Q6H PRN Max 4/day Yasmin Harvey MD      lithium carbonate  1,200 mg Oral HS HOLLI Anderson      lithium carbonate  300 mg Oral Daily HOLLI Anderson      melatonin  3 mg Oral HS Yasmin Harvey MD      mirtazapine  45 mg Oral HS La Flores MD      nicotine polacrilex  4 mg Oral Q2H PRN Yasmin Harvey MD      OLANZapine  10 mg Oral Daily HOLLI Anderson      OLANZapine  30 mg Oral HS Verónica Jo PA-C      polyethylene glycol  17 g Oral Daily PRN Yasmin Harvey MD      polyethylene glycol  17 g Oral Daily HOLLI Monge      senna-docusate sodium  1 tablet Oral Daily PRN Yasmin Harvey MD      traZODone  50 mg Oral HS PRN Yasmin Harvey MD       Risks / Benefits of Treatment:    Risks, benefits, and possible side effects of medications explained to patient. Patient has limited understanding of risks and benefits of treatment at this time, but agrees to take medications as prescribed.    Counseling / Coordination of Care:    Patient's progress discussed with staff in treatment team meeting.  Medications, treatment progress and treatment plan reviewed with patient.  Medication changes discussed with patient.    La Flores MD 12/19/23       70

## 2023-12-19 NOTE — PROGRESS NOTES
12/19/23 1246   Team Meeting   Meeting Type Daily Rounds   Team Members Present   Team Members Present Physician;Nurse;   Physician Team Member Mark   Nursing Team Member Giancarlo   Care Management Team Member Miguelito   Patient/Family Present   Patient Present No   Patient's Family Present No     Patient currently holds 201 status. Patient is medication and meal compliant. Patient denies all but continues to report AH. Patient to continue on Zyprexa 10 daily and 30 mg HS. Patient discharge date and plan to be determined.

## 2023-12-19 NOTE — NURSING NOTE
Patient is calm and cooperative on the unit. Denies SI, HI, SIB, visual hallucinations, endorses auditory hallucinations of voices saying he is going to hell. Med and meal compliant. Visible on the unit and selectively social. Denies any unmet needs. Q7 safety checks to continue.

## 2023-12-20 PROCEDURE — 99232 SBSQ HOSP IP/OBS MODERATE 35: CPT | Performed by: PSYCHIATRY & NEUROLOGY

## 2023-12-20 RX ADMIN — BENZTROPINE MESYLATE 0.5 MG: 0.5 TABLET ORAL at 08:15

## 2023-12-20 RX ADMIN — ESCITALOPRAM OXALATE 20 MG: 10 TABLET ORAL at 08:15

## 2023-12-20 RX ADMIN — MIRTAZAPINE 45 MG: 30 TABLET, FILM COATED ORAL at 21:13

## 2023-12-20 RX ADMIN — OLANZAPINE 30 MG: 10 TABLET, FILM COATED ORAL at 21:12

## 2023-12-20 RX ADMIN — BENZTROPINE MESYLATE 0.5 MG: 0.5 TABLET ORAL at 17:27

## 2023-12-20 RX ADMIN — OLANZAPINE 10 MG: 10 TABLET, FILM COATED ORAL at 08:15

## 2023-12-20 RX ADMIN — LITHIUM CARBONATE 300 MG: 300 TABLET, EXTENDED RELEASE ORAL at 08:15

## 2023-12-20 RX ADMIN — CYANOCOBALAMIN TAB 1000 MCG 1000 MCG: 1000 TAB at 08:15

## 2023-12-20 RX ADMIN — POLYETHYLENE GLYCOL 3350 17 G: 17 POWDER, FOR SOLUTION ORAL at 08:14

## 2023-12-20 RX ADMIN — NICOTINE POLACRILEX 4 MG: 4 GUM, CHEWING ORAL at 18:50

## 2023-12-20 RX ADMIN — CHOLECALCIFEROL TAB 25 MCG (1000 UNIT) 2000 UNITS: 25 TAB at 08:15

## 2023-12-20 RX ADMIN — LITHIUM CARBONATE 1200 MG: 450 TABLET, EXTENDED RELEASE ORAL at 21:12

## 2023-12-20 RX ADMIN — MELATONIN TAB 3 MG 3 MG: 3 TAB at 21:13

## 2023-12-20 NOTE — PLAN OF CARE
Problem: PSYCHOSIS  Goal: Will report no hallucinations or delusions  Description: Interventions:  - Administer medication as  ordered  - Every waking shifts and PRN assess for the presence of hallucinations and or delusions  - Assist with reality testing to support increasing orientation  - Assess if patient's hallucinations or delusions are encouraging self-harm or harm to others and intervene as appropriate  Outcome: Progressing     Problem: ANXIETY  Goal: Will report anxiety at manageable levels  Description: INTERVENTIONS:  - Administer medication as ordered  - Teach and encourage coping skills  - Provide emotional support  - Assess patient/family for anxiety and ability to cope  Outcome: Progressing     Problem: Depression  Goal: Treatment Goal: Demonstrate behavioral control of depressive symptoms, verbalize feelings of improved mood/affect, and adopt new coping skills prior to discharge  Outcome: Progressing  Goal: Verbalize thoughts and feelings  Description: Interventions:  - Assess and re-assess patient's level of risk   - Engage patient in 1:1 interactions, daily, for a minimum of 15 minutes   - Encourage patient to express feelings, fears, frustrations, hopes   Outcome: Progressing  Goal: Refrain from self-neglect  Outcome: Progressing     Problem: Ineffective Coping  Goal: Participates in unit activities  Description: Interventions:  - Provide therapeutic environment   - Provide required programming   - Redirect inappropriate behaviors   Outcome: Not Progressing     Problem: Depression  Goal: Refrain from isolation  Description: Interventions:  - Develop a trusting relationship   - Encourage socialization   Outcome: Not Progressing

## 2023-12-20 NOTE — NURSING NOTE
Pt denies SI/HI/VH.  Pt states he continues to have AH of voices telling him that they are going to harm him or/and his family. Medication and meal compliant. Present in dayroom and milieu. Social with peers. No further concerns as of present. Plan of care ongoing.

## 2023-12-20 NOTE — PROGRESS NOTES
Progress Note - Behavioral Health     Alberto Berumen 28 y.o. male MRN: [id number]   Unit/Bed#: [facility] 383-02 Encounter: [id number]    Documentation, nursing notes, medication reconciliation, labs, and vitals reviewed. Patient was seen for continuing care and reviewed with treatment team. No acute events over the past 24 hours.  Per nursing report, has been isolative to his room both during the day and in the evening.  Continues to complain of auditory hallucinations threatening him or his family.    Medication changes over the past 24 hours:  Haldol has been discontinued. Continues to tolerate current medications with no adverse effects.     On evaluation today, he is seen in his room.  Continues flat and depressed.  Continues to report no improvement in his hallucinations with increase of Zyprexa.  Reports only minimal improvement in hallucinations overall since his admission last month.  I did review with him the plan to monitor for effectiveness of Zyprexa 40 mg, and consider a change in antipsychotic to possibly Loxitane if he continues without improvements.  He verbalizes understanding and agreement to this plan.  No suicidal ideation, plan, or intent, and does not exhibit any symptoms of aimee on evaluation.    Psychiatric ROS:  Behavior over the last 24 hours: limited improvement  Sleep: slept off and on  Appetite: fair  Medication side effects: No   ROS: no complaints, all other systems are negative    Mental Status Evaluation:    Appearance:  age appropriate   Behavior:  cooperative, guarded   Speech:  slow, soft   Mood:  depressed   Affect:  constricted   Thought Process:  decreased rate of thoughts   Associations: concrete associations   Thought Content:  some paranoia   Perceptual Disturbances: auditory hallucinations   Risk Potential: Suicidal ideation - None at present  Homicidal ideation - None  Potential for aggression - No   Sensorium:  oriented to person, place, and time/date   Memory:  recent and  remote memory grossly intact   Consciousness:  alert and awake   Attention/Concentration: decreased concentration and decreased attention span   Insight:  poor   Judgment: poor   Gait/Station: normal gait/station, normal balance   Motor Activity: no abnormal movements     Vital signs in last 24 hours:    Temp:  [98.1 °F (36.7 °C)-98.3 °F (36.8 °C)] 98.1 °F (36.7 °C)  HR:  [103-111] 103  Resp:  [16-17] 17  BP: (128-162)/(85-86) 128/85    Laboratory results: I have personally reviewed all pertinent laboratory/tests results    Results from the past 24 hours: No results found for this or any previous visit (from the past 24 hour(s)).    Suicide/Homicide Risk Assessment:    Risk of Harm to Self:   Current Specific Risk Factors include: presence of hallucinations  Protective Factors: no current suicidal ideation, ability to communicate with staff on the unit, able to contract for safety on the unit, taking medications as ordered on the unit  Based on today's assessment, Alberto presents the following risk of harm to self: minimal    Risk of Harm to Others:  Current Specific Risk Factors include: current psychotic symptoms  Protective Factors: no current homicidal ideation, able to communicate with staff on the unit, compliant with medications on the unit as ordered  Based on today's assessment, Alberto presents the following risk of harm to others: minimal    The following interventions are recommended: behavioral checks every 7 minutes, continued hospitalization on locked unit    Progress Toward Goals: no significant improvement, still reports psychotic symptoms, does not participate in groups, stays in the room    Assessment/Plan   Principal Problem:    Schizoaffective disorder, bipolar type (HCC)  Active Problems:    GERD (gastroesophageal reflux disease)    Medical clearance for psychiatric admission    Tobacco abuse    T wave inversion in EKG    Chronic idiopathic constipation    Vitamin B 12 deficiency    Vitamin D  deficiency      Recommended Treatment:     Planned medication and treatment changes:    All current active medications have been reviewed  Encourage group therapy, milieu therapy and occupational therapy  Behavioral Health checks every 7 minutes  Continue current medications:    Current Facility-Administered Medications   Medication Dose Route Frequency Provider Last Rate    acetaminophen  650 mg Oral Q6H PRN Yasmin Harvey MD      acetaminophen  650 mg Oral Q4H PRN Yasmin Harvey MD      acetaminophen  975 mg Oral Q6H PRN Yasmin Harvey MD      aluminum-magnesium hydroxide-simethicone  30 mL Oral Q4H PRN Yasmin Harvey MD      haloperidol lactate  2.5 mg Intramuscular Q4H PRN Max 4/day Yasmin Harvey MD      And    LORazepam  1 mg Intramuscular Q4H PRN Max 4/day Yasmin Harvey MD      And    benztropine  0.5 mg Intramuscular Q4H PRN Max 4/day Yasmin Harvey MD      haloperidol lactate  5 mg Intramuscular Q4H PRN Max 4/day Yasmin Harvey MD      And    LORazepam  2 mg Intramuscular Q4H PRN Max 4/day Yasmin Harvey MD      And    benztropine  1 mg Intramuscular Q4H PRN Max 4/day Yasmin Harvey MD      benztropine  0.5 mg Oral BID La Flores MD      benztropine  1 mg Oral Q4H PRN Max 6/day Yasmin Harvey MD      bisacodyl  10 mg Rectal Daily PRN Yasmin Harvey MD      cholecalciferol  2,000 Units Oral Daily HOLLI Monge      cyanocobalamin  1,000 mcg Oral Daily HOLLI Monge      hydrOXYzine HCL  50 mg Oral Q6H PRN Max 4/day Yasmin Harvey MD      Or    diphenhydrAMINE  50 mg Intramuscular Q6H PRN Yasmin Harvey MD      escitalopram  20 mg Oral Daily La Flores MD      haloperidol  1 mg Oral Q6H PRN Yasmin Harvey MD      haloperidol  2.5 mg Oral Q4H PRN Max 4/day Yasmin Harvey MD      haloperidol  5 mg Oral Q4H PRN Max 4/day Yasmin Harvey MD      hydrOXYzine HCL  100 mg Oral Q6H PRN Max 4/day Yasmin Harvey MD       Or    LORazepam  2 mg Intramuscular Q6H PRN Yasmin Harvey MD      hydrOXYzine HCL  25 mg Oral Q6H PRN Max 4/day Yasmin Harvey MD      lithium carbonate  1,200 mg Oral HS HOLLI Anderson      lithium carbonate  300 mg Oral Daily HOLLI Anderson      melatonin  3 mg Oral HS Yasmin Harvey MD      mirtazapine  45 mg Oral HS La Flores MD      nicotine polacrilex  4 mg Oral Q2H PRN Yasmin Harvey MD      OLANZapine  10 mg Oral Daily HOLLI Anderson      OLANZapine  30 mg Oral HS Verónica Jo PA-C      polyethylene glycol  17 g Oral Daily PRN Yasmin Harvey MD      polyethylene glycol  17 g Oral Daily HOLLI Monge      senna-docusate sodium  1 tablet Oral Daily PRN Yasmin Harvey MD      traZODone  50 mg Oral HS PRN Yasmin Harvey MD       Risks / Benefits of Treatment:    Risks, benefits, and possible side effects of medications explained to patient and patient verbalizes understanding and agreement for treatment.    Counseling / Coordination of Care:    Patient's progress discussed with staff in treatment team meeting.  Medications, treatment progress and treatment plan reviewed with patient.    HOLLI Anderson 12/20/23

## 2023-12-20 NOTE — NURSING NOTE
Pt pleasant and cooperative, Pt isolated in room in bed all shift , Pt denied all pain anxiety and depression, Pt denies HI and SI , Pt denied AH and VH, Pt took all scheduled HS meds, Q7 min safety checks maintained

## 2023-12-20 NOTE — PROGRESS NOTES
12/20/23 1448   Team Meeting   Meeting Type Daily Rounds   Team Members Present   Team Members Present Physician;Nurse;   Physician Team Member Mark   Nursing Team Member Earl   Care Management Team Member Miguelito   Patient/Family Present   Patient Present No   Patient's Family Present No     Patient currently holds 201 status. Patient denies all symptoms but continues to report AH. Patient to continue on Zyprexa 10 daily and 30 mg HS. Patient discharge date and plan to be determined.

## 2023-12-21 PROCEDURE — 99232 SBSQ HOSP IP/OBS MODERATE 35: CPT | Performed by: PSYCHIATRY & NEUROLOGY

## 2023-12-21 RX ADMIN — BENZTROPINE MESYLATE 0.5 MG: 0.5 TABLET ORAL at 17:40

## 2023-12-21 RX ADMIN — LITHIUM CARBONATE 1200 MG: 450 TABLET, EXTENDED RELEASE ORAL at 21:32

## 2023-12-21 RX ADMIN — CHOLECALCIFEROL TAB 25 MCG (1000 UNIT) 2000 UNITS: 25 TAB at 08:15

## 2023-12-21 RX ADMIN — CYANOCOBALAMIN TAB 1000 MCG 1000 MCG: 1000 TAB at 08:15

## 2023-12-21 RX ADMIN — MIRTAZAPINE 45 MG: 30 TABLET, FILM COATED ORAL at 21:32

## 2023-12-21 RX ADMIN — OLANZAPINE 30 MG: 10 TABLET, FILM COATED ORAL at 21:32

## 2023-12-21 RX ADMIN — OLANZAPINE 10 MG: 10 TABLET, FILM COATED ORAL at 08:15

## 2023-12-21 RX ADMIN — POLYETHYLENE GLYCOL 3350 17 G: 17 POWDER, FOR SOLUTION ORAL at 08:15

## 2023-12-21 RX ADMIN — ESCITALOPRAM OXALATE 20 MG: 10 TABLET ORAL at 08:15

## 2023-12-21 RX ADMIN — BENZTROPINE MESYLATE 0.5 MG: 0.5 TABLET ORAL at 08:15

## 2023-12-21 RX ADMIN — NICOTINE POLACRILEX 4 MG: 4 GUM, CHEWING ORAL at 09:35

## 2023-12-21 RX ADMIN — MELATONIN TAB 3 MG 3 MG: 3 TAB at 21:32

## 2023-12-21 RX ADMIN — LITHIUM CARBONATE 300 MG: 300 TABLET, EXTENDED RELEASE ORAL at 08:15

## 2023-12-21 NOTE — NURSING NOTE
Pt denies SI/HI/VH.  Pt states he continues to have AH of voices telling him that they are going to kill him and/or his family. Pt states he currently feels safe on the unit. Pt did not want any PRN's at this time. Medication and meal compliant. Present in dayroom and milieu. No further concerns as of present. Plan of care ongoing.

## 2023-12-21 NOTE — NURSING NOTE
Patient noted out of room to day room , appeared to be in good spirits. Compliant with medications and routine care . Denies SI/HI/AH/VH, offers no complaints of pain or discomfort at this time.

## 2023-12-21 NOTE — CASE MANAGEMENT
Writer obtained fax from [place] stating that his benefit was closed. Writer to call [facility] with patient tomorrow to discuss activating his [place] application for [place].

## 2023-12-21 NOTE — TREATMENT TEAM
12/21/23 1548   Team Meeting   Meeting Type Daily Rounds   Team Members Present   Team Members Present Physician;Nurse;   Physician Team Member Mark   Nursing Team Member Earl   Care Management Team Member Miguelito   Patient/Family Present   Patient Present No   Patient's Family Present No     Patient currently holds 201 status. Patient denies all but reports continued AH. Patient is medication and meal compliant. Patient closed his [place] benefit and is now eligible [place]. Patient to continue on current medication. Patient discharge plan and date to be determined.

## 2023-12-21 NOTE — NURSING NOTE
"PT observed visible in the unit, cooperative and calm upon approach. PT denies SI/HI/VH, continues to state AH and said \"same thing\". Denies self harm. PT compliant with HS meds and no pain/discomfort and no further unmet needs.    "

## 2023-12-21 NOTE — PROGRESS NOTES
Progress Note - Behavioral Health   Alberto Berumen 28 y.o. male MRN: [id number]  Unit/Bed#: [facility] 383-02 Encounter: [id number]    Patient was seen today for continuation of care, records reviewed and patient was discussed with the morning case review team.  Per staff,  Alberto continues to report AH.  He is meal and medication compliant.  He is visible and social on the unit.      Alberto was seen today for psychiatric follow-up.  On assessment today, Alberto was seen in his room.  He was initially observed in the group room with peers.  Alberto presents as internally preoccupied but he is calm and cooperative with the interview.  He notes some improvement in AH since starting Zyprexa but voices continue to be frequent.  He continues to report that the voices are saying that they will kill his family.   He does also report some paranoid feeling and feeling not safe because of the AH.      Alberto adamantly denies acute suicidal/self-harm ideation/intent/plan upon direct inquiry at this time and denies homicidal ideation as well.  Alberto remains behaviorally appropriate, no agitation or aggression noted on exam or in report.  Alberto denies VH, and does not appear overtly manic.  Alberto remains adherent to his current psychotropic medication regimen and denies any side effects from medications, as well as none noted on exam.    Plan to continue current medications: Zyprexa 10mg PO daily and 30mg PO at HS, Cogentin 0.5mg BID, Lexapro 20mg PO daily, Lithium 300mg PO daily and 1200mg PO at HS (last level 12/13/23 0.6), mirtazapine 45 mg PO at HS and melatonin 3mg PO at HS.  If no further progress on Zyprexa over the weekend then consider changing antipsychotic medication.       Vitals:  Vitals:    12/21/23 0752   BP: 141/83   Pulse: 94   Resp: 17   Temp: 97.8 °F (36.6 °C)   SpO2: 98%       Laboratory Results:  I have personally reviewed all pertinent laboratory/tests results.  Most Recent Labs:   Lab Results  "  Component Value Date    WBC 10.15 11/15/2023    RBC 5.32 11/15/2023    HGB 12.8 11/15/2023    HCT 42.4 11/15/2023     11/15/2023    RDW 14.0 11/15/2023    NEUTROABS 6.25 11/15/2023    SODIUM 137 12/15/2023    K 4.2 12/15/2023     12/15/2023    CO2 29 12/15/2023    BUN 10 12/15/2023    CREATININE 0.85 12/15/2023    GLUC 101 12/15/2023    GLUF 101 (H) 12/15/2023    CALCIUM 9.6 12/15/2023    AST 22 11/15/2023    ALT 40 11/15/2023    ALKPHOS 69 11/15/2023    TP 6.5 11/15/2023    ALB 4.1 11/15/2023    TBILI 0.45 11/15/2023    CHOLESTEROL 182 12/15/2023    HDL 56 12/15/2023    TRIG 118 12/15/2023    LDLCALC 102 (H) 12/15/2023    NONHDLC 126 12/15/2023    LITHIUM 0.6 12/13/2023    OCQ3ZPDJGEUL 1.062 11/15/2023    HGBA1C 4.9 11/15/2023    EAG 94 11/15/2023       Psychiatric Review of Systems:  Behavior over the last 24 hours:  minimal improvement  Sleep: on and off, about 6 hours a night  Appetite: adequate  Medication side effects: denies  ROS: no complaints, denies shortness of breath or chest pain and all other systems are negative for acute changes    Mental Status Evaluation:    Appearance:  casually dressed, marginal hygiene, looks stated age   Behavior:  pleasant, guarded   Speech:  scant, soft   Mood:  dysphoric   Affect:  blunted   Thought Process:  organized, slowing of thoughts   Associations: concrete associations   Thought Content:  some paranoia   Perceptual Disturbances: auditory hallucinations of \"voices that say they will kill my family\", denies VH   Risk Potential: Suicidal ideation - None at present, contracts for safety on the unit, would talk to staff if not feeling safe on the unit  Homicidal ideation - None  Potential for aggression - No   Sensorium:  oriented to person, place, and situation   Memory:  recent memory intact   Consciousness:  alert and awake   Attention/Concentration: attention span and concentration appear shorter than expected for age   Insight:  partial   Judgment: " partial   Gait/Station: normal gait/station   Motor Activity: no abnormal movements     Progress Toward Goals:   Alberto is progressing towards goals of inpatient psychiatric treatment by continued medication compliance and is attending therapeutic modalities on the milieu. However, the patient continues to require inpatient psychiatric hospitalization for continued medication management and titration to optimize symptom reduction, improve sleep hygiene, and demonstrate adequate self-care.    Risk of Harm to Self:   Nursing Suicide Risk Assessment Last 24 hours: C-SSRS Risk (Since Last Contact)  Calculated C-SSRS Risk Score (Since Last Contact): No Risk Indicated  Current Specific Risk Factors include: presence of hallucinations  Protective Factors: no current suicidal ideation, ability to communicate with staff on the unit, able to contract for safety on the unit  Based on today's assessment, Alberto presents the following risk of harm to self: low    Risk of Harm to Others:  Nursing Homicide Risk Assessment: Violence Risk to Others: Denies within past 6 months  Current Specific Risk Factors include: current psychotic symptoms  Protective Factors: no current homicidal ideation, able to communicate with staff on the unit, psychotic symptoms are less prominent, compliant with medications on the unit as ordered, compliant with unit milieu, follows staff redirection  Based on today's assessment, Alberto presents the following risk of harm to others: low    The following interventions are recommended: behavioral checks every 7 minutes, continued hospitalization on locked unit      Assessment/Plan   Principal Problem:    Schizoaffective disorder, bipolar type (HCC)  Active Problems:    GERD (gastroesophageal reflux disease)    Medical clearance for psychiatric admission    Tobacco abuse    T wave inversion in EKG    Chronic idiopathic constipation    Vitamin B 12 deficiency    Vitamin D deficiency      Recommended  Treatment: Treatment plan and medication changes discussed and per the attending physician the plan is:    1.Continue with group therapy, milieu therapy and occupational therapy  2.Behavioral Health checks every 7 minutes  3.Continue frequent safety checks and vitals per unit protocol  4.Continue with [facility] medical management as indicated  5.Continue with current medication regimen  6.Will review labs in the a.m.  7.Disposition Planning: Discharge planning and efforts remain ongoing    Behavioral Health Medications: all current active meds have been reviewed and continue current psychiatric medications.  Current Facility-Administered Medications   Medication Dose Route Frequency Provider Last Rate    acetaminophen  650 mg Oral Q6H PRN Yasmin Harvey MD      acetaminophen  650 mg Oral Q4H PRN Yasmin Harvey MD      acetaminophen  975 mg Oral Q6H PRN Yasmin Harvey MD      aluminum-magnesium hydroxide-simethicone  30 mL Oral Q4H PRN Yasmin Harvey MD      haloperidol lactate  2.5 mg Intramuscular Q4H PRN Max 4/day Yasmin Harvey MD      And    LORazepam  1 mg Intramuscular Q4H PRN Max 4/day Yasmin Harvey MD      And    benztropine  0.5 mg Intramuscular Q4H PRN Max 4/day Yasmin Harvey MD      haloperidol lactate  5 mg Intramuscular Q4H PRN Max 4/day Yasmin Harvey MD      And    LORazepam  2 mg Intramuscular Q4H PRN Max 4/day Yasmin Harvey MD      And    benztropine  1 mg Intramuscular Q4H PRN Max 4/day Yasmin Harvey MD      benztropine  0.5 mg Oral BID La Flores MD      benztropine  1 mg Oral Q4H PRN Max 6/day Yasmin Harvey MD      bisacodyl  10 mg Rectal Daily PRN Yasmin Harvey MD      cholecalciferol  2,000 Units Oral Daily HOLLI Monge      cyanocobalamin  1,000 mcg Oral Daily HOLLI Monge      hydrOXYzine HCL  50 mg Oral Q6H PRN Max 4/day Yasmin Harvey MD      Or    diphenhydrAMINE  50 mg Intramuscular Q6H PRN Yasmin Harvey  MD      escitalopram  20 mg Oral Daily La Flores MD      haloperidol  1 mg Oral Q6H PRN Yasmin Harvey MD      haloperidol  2.5 mg Oral Q4H PRN Max 4/day Yasmin Harvey MD      haloperidol  5 mg Oral Q4H PRN Max 4/day Yasmin Harvey MD      hydrOXYzine HCL  100 mg Oral Q6H PRN Max 4/day Yasmin Harvey MD      Or    LORazepam  2 mg Intramuscular Q6H PRN Yasmin Harvey MD      hydrOXYzine HCL  25 mg Oral Q6H PRN Max 4/day Yasmin Harvey MD      lithium carbonate  1,200 mg Oral HS HOLLI Anderson      lithium carbonate  300 mg Oral Daily HOLLI Anderson      melatonin  3 mg Oral HS Yasmin Harvey MD      mirtazapine  45 mg Oral HS La Flores MD      nicotine polacrilex  4 mg Oral Q2H PRN Yasmin Harvey MD      OLANZapine  10 mg Oral Daily HOLLI Anderson      OLANZapine  30 mg Oral HS Verónica Jo PA-C      polyethylene glycol  17 g Oral Daily PRN Yasmin Harvey MD      polyethylene glycol  17 g Oral Daily HOLLI Monge      senna-docusate sodium  1 tablet Oral Daily PRN Yasmin Harvey MD      traZODone  50 mg Oral HS PRN Yasmin Harvey MD         Risks / Benefits of Treatment:  Risks, benefits, and possible side effects of medications explained to patient and patient verbalizes understanding and agreement for treatment.    Counseling / Coordination of Care:  Patient's progress reviewed with nursing staff.  Medications, treatment progress and treatment plan reviewed with patient.  Supportive counseling provided to the patient.    Total floor/unit time spent today 25 minutes. Greater than 50% of total time was spent with the patient and / or family counseling and / or coordination of care. A description of the counseling / coordination of care: medication education, treatment plan, supportive therapy.

## 2023-12-22 PROCEDURE — 99232 SBSQ HOSP IP/OBS MODERATE 35: CPT | Performed by: PSYCHIATRY & NEUROLOGY

## 2023-12-22 RX ADMIN — NICOTINE POLACRILEX 4 MG: 4 GUM, CHEWING ORAL at 08:26

## 2023-12-22 RX ADMIN — LITHIUM CARBONATE 1200 MG: 450 TABLET, EXTENDED RELEASE ORAL at 21:19

## 2023-12-22 RX ADMIN — MIRTAZAPINE 45 MG: 30 TABLET, FILM COATED ORAL at 21:19

## 2023-12-22 RX ADMIN — CYANOCOBALAMIN TAB 1000 MCG 1000 MCG: 1000 TAB at 08:26

## 2023-12-22 RX ADMIN — MELATONIN TAB 3 MG 3 MG: 3 TAB at 21:19

## 2023-12-22 RX ADMIN — ESCITALOPRAM OXALATE 20 MG: 10 TABLET ORAL at 08:26

## 2023-12-22 RX ADMIN — OLANZAPINE 30 MG: 10 TABLET, FILM COATED ORAL at 21:19

## 2023-12-22 RX ADMIN — CHOLECALCIFEROL TAB 25 MCG (1000 UNIT) 2000 UNITS: 25 TAB at 08:26

## 2023-12-22 RX ADMIN — BENZTROPINE MESYLATE 0.5 MG: 0.5 TABLET ORAL at 08:26

## 2023-12-22 RX ADMIN — POLYETHYLENE GLYCOL 3350 17 G: 17 POWDER, FOR SOLUTION ORAL at 08:25

## 2023-12-22 RX ADMIN — OLANZAPINE 10 MG: 10 TABLET, FILM COATED ORAL at 08:26

## 2023-12-22 RX ADMIN — TRAZODONE HYDROCHLORIDE 50 MG: 50 TABLET ORAL at 21:19

## 2023-12-22 RX ADMIN — LITHIUM CARBONATE 300 MG: 300 TABLET, EXTENDED RELEASE ORAL at 08:26

## 2023-12-22 RX ADMIN — BENZTROPINE MESYLATE 0.5 MG: 0.5 TABLET ORAL at 17:42

## 2023-12-22 NOTE — PROGRESS NOTES
Progress Note - Behavioral Health     Alberto Berumen 28 y.o. male MRN: [id number]   Unit/Bed#: [facility] 383-02 Encounter: [id number]    Documentation, nursing notes, medication reconciliation, labs, and vitals reviewed. Patient was seen for continuing care and reviewed with treatment team. No acute events over the past 24 hours.  Per nursing report, continues to report auditory hallucinations, continues isolative and poorly motivated.    No medication changes over the past 24 hours. Continues to tolerate current medications with no adverse effects.     On evaluation today, he is again seen resting in bed.  Continues to report auditory hallucinations with no improvements, no break and hearing voices telling him that he and his family will be harmed or will be going to help.  He is frustrated with ongoing hallucinations.  We discussed the plan to hopefully start him on Clozaril when his insurance situation is sorted out.  He is hopeful about same.  No suicidal ideation, plan, or intent, and does not exhibit any symptoms of aimee on evaluation.    Psychiatric ROS:  Behavior over the last 24 hours: unchanged  Sleep: slept off and on  Appetite: fair  Medication side effects: No   ROS: no complaints, all other systems are negative    Mental Status Evaluation:    Appearance:  marginal hygiene   Behavior:  calm   Speech:  scant   Mood:  depressed, dysphoric   Affect:  constricted   Thought Process:  decreased rate of thoughts   Associations: concrete associations   Thought Content:  some paranoia   Perceptual Disturbances: auditory hallucinations   Risk Potential: Suicidal ideation - None  Homicidal ideation - None  Potential for aggression - No   Sensorium:  oriented to person, place, and time/date   Memory:  recent and remote memory grossly intact   Consciousness:  alert and awake   Attention/Concentration: decreased concentration and decreased attention span   Insight:  limited   Judgment: limited   Gait/Station: normal  gait/station, normal balance   Motor Activity: no abnormal movements     Vital signs in last 24 hours:    Temp:  [98.4 °F (36.9 °C)] 98.4 °F (36.9 °C)  HR:  [109] 109  Resp:  [18] 18  BP: (157)/(91) 157/91    Laboratory results: I have personally reviewed all pertinent laboratory/tests results    Results from the past 24 hours: No results found for this or any previous visit (from the past 24 hour(s)).    Suicide/Homicide Risk Assessment:    Risk of Harm to Self:   Current Specific Risk Factors include: mental illness diagnosis, current psychotic symptoms  Protective Factors: no current suicidal ideation, ability to communicate with staff on the unit, able to contract for safety on the unit, taking medications as ordered on the unit  Based on today's assessment, Alberto presents the following risk of harm to self: none    Risk of Harm to Others:  Current Specific Risk Factors include: current psychotic symptoms  Protective Factors: no current homicidal ideation, able to communicate with staff on the unit, compliant with medications on the unit as ordered  Based on today's assessment, Alberto presents the following risk of harm to others: minimal    The following interventions are recommended: behavioral checks every 7 minutes, continued hospitalization on locked unit    Progress Toward Goals: continues to improve    Assessment/Plan   Principal Problem:    Schizoaffective disorder, bipolar type (HCC)  Active Problems:    GERD (gastroesophageal reflux disease)    Medical clearance for psychiatric admission    Tobacco abuse    T wave inversion in EKG    Chronic idiopathic constipation    Vitamin B 12 deficiency    Vitamin D deficiency      Recommended Treatment:     Planned medication and treatment changes:    All current active medications have been reviewed  Encourage group therapy, milieu therapy and occupational therapy  Behavioral Health checks every 7 minutes  Continue current medications:    Current  Facility-Administered Medications   Medication Dose Route Frequency Provider Last Rate    acetaminophen  650 mg Oral Q6H PRN Yasmin Harvey MD      acetaminophen  650 mg Oral Q4H PRN Yasmin Harvey MD      acetaminophen  975 mg Oral Q6H PRN Yasmin Harvey MD      aluminum-magnesium hydroxide-simethicone  30 mL Oral Q4H PRN Yasmin Harvey MD      haloperidol lactate  2.5 mg Intramuscular Q4H PRN Max 4/day Yasmin Harvey MD      And    LORazepam  1 mg Intramuscular Q4H PRN Max 4/day Yasmin Harvey MD      And    benztropine  0.5 mg Intramuscular Q4H PRN Max 4/day Yasmin Harvey MD      haloperidol lactate  5 mg Intramuscular Q4H PRN Max 4/day Yasmin Harvey MD      And    LORazepam  2 mg Intramuscular Q4H PRN Max 4/day Yasmin Harvey MD      And    benztropine  1 mg Intramuscular Q4H PRN Max 4/day Yasmin Harvey MD      benztropine  0.5 mg Oral BID La Flores MD      benztropine  1 mg Oral Q4H PRN Max 6/day Yasmin Harvey MD      bisacodyl  10 mg Rectal Daily PRN Yasmin Harvey MD      cholecalciferol  2,000 Units Oral Daily HOLLI Monge      cyanocobalamin  1,000 mcg Oral Daily HOLLI Monge      hydrOXYzine HCL  50 mg Oral Q6H PRN Max 4/day Yasmin Harvey MD      Or    diphenhydrAMINE  50 mg Intramuscular Q6H PRN Yasmin Harvey MD      escitalopram  20 mg Oral Daily La Flores MD      haloperidol  1 mg Oral Q6H PRN Yasmin Harvey MD      haloperidol  2.5 mg Oral Q4H PRN Max 4/day Yasmin Harvey MD      haloperidol  5 mg Oral Q4H PRN Max 4/day Yasmin Harvey MD      hydrOXYzine HCL  100 mg Oral Q6H PRN Max 4/day Yasmin Harvey MD      Or    LORazepam  2 mg Intramuscular Q6H PRN Yasmin Harvey MD      hydrOXYzine HCL  25 mg Oral Q6H PRN Max 4/day Yasmin Harvey MD      lithium carbonate  1,200 mg Oral HS HOLLI Anderson      lithium carbonate  300 mg Oral Daily HOLLI Anderson      melatonin  3 mg Oral HS  Yasmin Harvey MD      mirtazapine  45 mg Oral HS La Flores MD      nicotine polacrilex  4 mg Oral Q2H PRN Yasmin Harvey MD      OLANZapine  10 mg Oral Daily HOLLI Anderson      OLANZapine  30 mg Oral HS Verónica Jo PA-C      polyethylene glycol  17 g Oral Daily PRN Yasmin Harvey MD      polyethylene glycol  17 g Oral Daily HOLLI Monge      senna-docusate sodium  1 tablet Oral Daily PRN Yasmin Harvey MD      traZODone  50 mg Oral HS PRN Yasmin Harvey MD       Risks / Benefits of Treatment:    Risks, benefits, and possible side effects of medications explained to patient and patient verbalizes understanding and agreement for treatment.    Counseling / Coordination of Care:    Patient's progress discussed with staff in treatment team meeting.  Medications, treatment progress and treatment plan reviewed with patient.    HOLLI Anderson 12/22/23

## 2023-12-22 NOTE — PROGRESS NOTES
12/22/23 1025   Team Meeting   Meeting Type Daily Rounds   Team Members Present   Team Members Present Physician;Nurse;   Physician Team Member Mark   Nursing Team Member Drew   Care Management Team Member Miguelito   Patient/Family Present   Patient Present No   Patient's Family Present No     Patient currently holds 201 status. Patient is medication and meal compliant. Patient denies all symptoms but continues to report some symptoms of AH. Patient to continue on Lexapro 20 mg daily, Lithium 1200 mg and Lithium 300 mg daily, Remeron 45 mg HS, Zyprexa 10 mg daily, and Zyprexa 30 mg HS. Patient discharge date and plan to be determined.

## 2023-12-22 NOTE — CASE MANAGEMENT
Writer met with patient to call [facility] Regarding MA benefit. Writer and patient were on hold for about 15 minutes until the voice recording notified both parties that the [facility] assistance office was closed. Writer and patient will attempt at a later time.

## 2023-12-22 NOTE — NURSING NOTE
"Patient is calm and cooperative upon approach. Patient isolative to room this morning, only visible for meals. Patient reported AH of voices telling him, \"Im going to kill you and your family\". Patient denies other psych symptoms. Patient verbalizes feeling safe on unit. Patient is compliant with meds and meals.   "

## 2023-12-23 PROCEDURE — 99232 SBSQ HOSP IP/OBS MODERATE 35: CPT | Performed by: PSYCHIATRY & NEUROLOGY

## 2023-12-23 RX ORDER — BENZOCAINE/MENTHOL 6 MG-10 MG
LOZENGE MUCOUS MEMBRANE 4 TIMES DAILY PRN
Status: DISCONTINUED | OUTPATIENT
Start: 2023-12-23 | End: 2023-12-31

## 2023-12-23 RX ADMIN — MELATONIN TAB 3 MG 3 MG: 3 TAB at 21:25

## 2023-12-23 RX ADMIN — NICOTINE POLACRILEX 4 MG: 4 GUM, CHEWING ORAL at 18:24

## 2023-12-23 RX ADMIN — CYANOCOBALAMIN TAB 1000 MCG 1000 MCG: 1000 TAB at 08:17

## 2023-12-23 RX ADMIN — BENZTROPINE MESYLATE 0.5 MG: 0.5 TABLET ORAL at 08:17

## 2023-12-23 RX ADMIN — NICOTINE POLACRILEX 4 MG: 4 GUM, CHEWING ORAL at 09:03

## 2023-12-23 RX ADMIN — POLYETHYLENE GLYCOL 3350 17 G: 17 POWDER, FOR SOLUTION ORAL at 08:16

## 2023-12-23 RX ADMIN — BENZTROPINE MESYLATE 0.5 MG: 0.5 TABLET ORAL at 17:45

## 2023-12-23 RX ADMIN — LITHIUM CARBONATE 300 MG: 300 TABLET, EXTENDED RELEASE ORAL at 08:17

## 2023-12-23 RX ADMIN — CHOLECALCIFEROL TAB 25 MCG (1000 UNIT) 2000 UNITS: 25 TAB at 08:17

## 2023-12-23 RX ADMIN — TRAZODONE HYDROCHLORIDE 50 MG: 50 TABLET ORAL at 21:45

## 2023-12-23 RX ADMIN — ESCITALOPRAM OXALATE 20 MG: 10 TABLET ORAL at 08:17

## 2023-12-23 RX ADMIN — MIRTAZAPINE 45 MG: 30 TABLET, FILM COATED ORAL at 21:18

## 2023-12-23 RX ADMIN — OLANZAPINE 10 MG: 10 TABLET, FILM COATED ORAL at 08:17

## 2023-12-23 RX ADMIN — OLANZAPINE 30 MG: 10 TABLET, FILM COATED ORAL at 21:18

## 2023-12-23 RX ADMIN — LITHIUM CARBONATE 1200 MG: 450 TABLET, EXTENDED RELEASE ORAL at 21:17

## 2023-12-23 NOTE — NURSING NOTE
"Patient was visible out of their room for a bit this evening. When asked how their day was patient stated \"bad\" due to voices. Reports depression and auditory hallucinations of a voice saying that they and their family are going to die and go to hell. Patient encouraged to come to staff if scheduled night time medications do not help. Denied anxiety, SI, and HI. Medication compliant. Requested and received PRN trazodone 50 mg for insomnia at 2119. When asked waS patient would like to give melatonin a chance to work patient stated \"no, it does not work\".  Denies any unmet needs or concerns at this time.   "

## 2023-12-23 NOTE — PROGRESS NOTES
Progress Note - Behavioral Health   Alberto Berumen 28 y.o. male MRN: [id number]  Unit/Bed#: [facility] 383-02 Encounter: [id number]    Assessment/Plan   Principal Problem:    Schizoaffective disorder, bipolar type (HCC)  Active Problems:    GERD (gastroesophageal reflux disease)    Medical clearance for psychiatric admission    Tobacco abuse    T wave inversion in EKG    Chronic idiopathic constipation    Vitamin B 12 deficiency    Vitamin D deficiency      Behavior over the last 24 hours:  unchanged  Sleep: normal  Appetite: normal  Medication side effects: No  ROS: no complaints        Mental Status Evaluation:  Appearance:  age appropriate and disheveled   Behavior:  cooperative   Speech:  normal pitch and normal volume   Mood:  constricted   Affect:  constricted   Thought Process:  concrete   Associations: concrete associations   Thought Content:  delusions  persecutory   Perceptual Disturbances: None   Risk Potential: Suicidal Ideations none  Homicidal Ideations none  Potential for Aggression No   Sensorium:  person, place, and time/date   Memory:  recent and remote memory grossly intact   Consciousness:  alert and awake    Attention: attention span appeared shorter than expected for age   Insight:  limited   Judgment: limited   Gait/Station: normal gait/station and normal balance   Motor Activity: no abnormal movements     Progress Toward Goals: Patient remains compliant with medications and denies side effects. According to staff report he remains scant and guarded with communication. He is observed mostly sleeping in his room. Social with select peers.  He denies Si or HI, or A/V hallucinations. He denies depressed mood. He reported dry skin with itching and irritation behind both ears, hydrocortisone cream prn ordered.  Agrees to continue current treatment.    Recommended Treatment: Continue with group therapy, milieu therapy and occupational therapy.    Treatment plan, treatment progress and medication changes  were reviewed with nursing staff Observe progress over the weekend on current Zyprexa dosing 10 mg daily and 30 mg at bedtime  Consider cross taper to Loxapine next week if no progress or potentially to Clozaril if his insurance benefits are transferred to [place].     to follow  Continue all other medications.      Risks, benefits and possible side effects of Medications:   Risks, benefits, and possible side effects of medications explained to patient and patient verbalizes understanding.      Medications: all current active meds have been reviewed, continue current psychiatric medications, and current meds:   Current Facility-Administered Medications   Medication Dose Route Frequency    acetaminophen (TYLENOL) tablet 650 mg  650 mg Oral Q6H PRN    acetaminophen (TYLENOL) tablet 650 mg  650 mg Oral Q4H PRN    acetaminophen (TYLENOL) tablet 975 mg  975 mg Oral Q6H PRN    aluminum-magnesium hydroxide-simethicone (MAALOX) oral suspension 30 mL  30 mL Oral Q4H PRN    haloperidol lactate (HALDOL) injection 2.5 mg  2.5 mg Intramuscular Q4H PRN Max 4/day    And    LORazepam (ATIVAN) injection 1 mg  1 mg Intramuscular Q4H PRN Max 4/day    And    benztropine (COGENTIN) injection 0.5 mg  0.5 mg Intramuscular Q4H PRN Max 4/day    haloperidol lactate (HALDOL) injection 5 mg  5 mg Intramuscular Q4H PRN Max 4/day    And    LORazepam (ATIVAN) injection 2 mg  2 mg Intramuscular Q4H PRN Max 4/day    And    benztropine (COGENTIN) injection 1 mg  1 mg Intramuscular Q4H PRN Max 4/day    benztropine (COGENTIN) tablet 0.5 mg  0.5 mg Oral BID    benztropine (COGENTIN) tablet 1 mg  1 mg Oral Q4H PRN Max 6/day    bisacodyl (DULCOLAX) rectal suppository 10 mg  10 mg Rectal Daily PRN    cholecalciferol (VITAMIN D3) tablet 2,000 Units  2,000 Units Oral Daily    cyanocobalamin (VITAMIN B-12) tablet 1,000 mcg  1,000 mcg Oral Daily    hydrOXYzine HCL (ATARAX) tablet 50 mg  50 mg Oral Q6H PRN Max 4/day    Or    diphenhydrAMINE  (BENADRYL) injection 50 mg  50 mg Intramuscular Q6H PRN    escitalopram (LEXAPRO) tablet 20 mg  20 mg Oral Daily    haloperidol (HALDOL) tablet 1 mg  1 mg Oral Q6H PRN    haloperidol (HALDOL) tablet 2.5 mg  2.5 mg Oral Q4H PRN Max 4/day    haloperidol (HALDOL) tablet 5 mg  5 mg Oral Q4H PRN Max 4/day    hydrocortisone 1 % cream   Topical 4x Daily PRN    hydrOXYzine HCL (ATARAX) tablet 100 mg  100 mg Oral Q6H PRN Max 4/day    Or    LORazepam (ATIVAN) injection 2 mg  2 mg Intramuscular Q6H PRN    hydrOXYzine HCL (ATARAX) tablet 25 mg  25 mg Oral Q6H PRN Max 4/day    lithium carbonate (LITHOBID) CR tablet 1,200 mg  1,200 mg Oral HS    lithium carbonate (LITHOBID) CR tablet 300 mg  300 mg Oral Daily    melatonin tablet 3 mg  3 mg Oral HS    mirtazapine (REMERON) tablet 45 mg  45 mg Oral HS    nicotine polacrilex (NICORETTE) gum 4 mg  4 mg Oral Q2H PRN    OLANZapine (ZyPREXA) tablet 10 mg  10 mg Oral Daily    OLANZapine (ZyPREXA) tablet 30 mg  30 mg Oral HS    polyethylene glycol (MIRALAX) packet 17 g  17 g Oral Daily PRN    polyethylene glycol (MIRALAX) packet 17 g  17 g Oral Daily    senna-docusate sodium (SENOKOT S) 8.6-50 mg per tablet 1 tablet  1 tablet Oral Daily PRN    traZODone (DESYREL) tablet 50 mg  50 mg Oral HS PRN   .    Labs: I have personally reviewed all pertinent laboratory/tests results. Most Recent Labs:   Lab Results   Component Value Date    WBC 10.15 11/15/2023    RBC 5.32 11/15/2023    HGB 12.8 11/15/2023    HCT 42.4 11/15/2023     11/15/2023    RDW 14.0 11/15/2023    NEUTROABS 6.25 11/15/2023    SODIUM 137 12/15/2023    K 4.2 12/15/2023     12/15/2023    CO2 29 12/15/2023    BUN 10 12/15/2023    CREATININE 0.85 12/15/2023    GLUC 101 12/15/2023    GLUF 101 (H) 12/15/2023    CALCIUM 9.6 12/15/2023    AST 22 11/15/2023    ALT 40 11/15/2023    ALKPHOS 69 11/15/2023    TP 6.5 11/15/2023    ALB 4.1 11/15/2023    TBILI 0.45 11/15/2023    CHOLESTEROL 182 12/15/2023    HDL 56 12/15/2023     TRIG 118 12/15/2023    LDLCALC 102 (H) 12/15/2023    NONHDLC 126 12/15/2023    LITHIUM 0.6 12/13/2023    [id number] 1.062 11/15/2023    HGBA1C 4.9 11/15/2023    EAG 94 11/15/2023       Counseling / Coordination of Care  Total floor / unit time spent today n/a minutes. Greater than 50% of total time was spent with the patient and / or family counseling and / or coordination of care. A description of the counseling / coordination of care:

## 2023-12-23 NOTE — NURSING NOTE
Pt denies SI/HI/AH/VH. Scant/guarded with communication. Medication and meal compliant. Present in dayroom and milieu for meals this morning but was observed mostly sleeping in his room. Social with select peers.  Pt at times appears depressed.  No further concerns as of present. Plan of care ongoing.

## 2023-12-24 PROCEDURE — 99232 SBSQ HOSP IP/OBS MODERATE 35: CPT | Performed by: PSYCHIATRY & NEUROLOGY

## 2023-12-24 RX ADMIN — BENZTROPINE MESYLATE 0.5 MG: 0.5 TABLET ORAL at 08:56

## 2023-12-24 RX ADMIN — OLANZAPINE 30 MG: 10 TABLET, FILM COATED ORAL at 21:20

## 2023-12-24 RX ADMIN — CHOLECALCIFEROL TAB 25 MCG (1000 UNIT) 2000 UNITS: 25 TAB at 08:56

## 2023-12-24 RX ADMIN — LITHIUM CARBONATE 1200 MG: 450 TABLET, EXTENDED RELEASE ORAL at 21:21

## 2023-12-24 RX ADMIN — OLANZAPINE 10 MG: 10 TABLET, FILM COATED ORAL at 08:57

## 2023-12-24 RX ADMIN — ESCITALOPRAM OXALATE 20 MG: 10 TABLET ORAL at 08:56

## 2023-12-24 RX ADMIN — POLYETHYLENE GLYCOL 3350 17 G: 17 POWDER, FOR SOLUTION ORAL at 08:56

## 2023-12-24 RX ADMIN — NICOTINE POLACRILEX 4 MG: 4 GUM, CHEWING ORAL at 09:04

## 2023-12-24 RX ADMIN — BENZTROPINE MESYLATE 0.5 MG: 0.5 TABLET ORAL at 17:34

## 2023-12-24 RX ADMIN — MELATONIN TAB 3 MG 3 MG: 3 TAB at 21:21

## 2023-12-24 RX ADMIN — MIRTAZAPINE 45 MG: 30 TABLET, FILM COATED ORAL at 21:21

## 2023-12-24 RX ADMIN — NICOTINE POLACRILEX 4 MG: 4 GUM, CHEWING ORAL at 12:40

## 2023-12-24 RX ADMIN — LITHIUM CARBONATE 300 MG: 300 TABLET, EXTENDED RELEASE ORAL at 09:00

## 2023-12-24 RX ADMIN — NICOTINE POLACRILEX 4 MG: 4 GUM, CHEWING ORAL at 15:11

## 2023-12-24 RX ADMIN — CYANOCOBALAMIN TAB 1000 MCG 1000 MCG: 1000 TAB at 08:57

## 2023-12-24 NOTE — NURSING NOTE
Pt visible and social with selective peers.  Pt compliant with meds/meals,  denies HI/Si/VH.  Pt reports Auditory hallucinations of voiced warning of danger coming to his family.

## 2023-12-24 NOTE — PROGRESS NOTES
Progress Note - Behavioral Health   Alberto Berumen 28 y.o. male MRN: [id number]  Unit/Bed#: [facility] 383-02 Encounter: [id number]    Assessment/Plan   Principal Problem:    Schizoaffective disorder, bipolar type (HCC)  Active Problems:    GERD (gastroesophageal reflux disease)    Medical clearance for psychiatric admission    Tobacco abuse    T wave inversion in EKG    Chronic idiopathic constipation    Vitamin B 12 deficiency    Vitamin D deficiency      Behavior over the last 24 hours:  unchanged  Sleep: normal  Appetite: normal  Medication side effects: No  ROS: no complaints        Mental Status Evaluation:  Appearance:  age appropriate and disheveled   Behavior:  cooperative   Speech:  normal pitch and normal volume   Mood:  constricted   Affect:  constricted   Thought Process:  concrete   Associations: concrete associations   Thought Content:  delusions  persecutory   Perceptual Disturbances: None   Risk Potential: Suicidal Ideations none  Homicidal Ideations none  Potential for Aggression No   Sensorium:  person, place, and time/date   Memory:  recent and remote memory grossly intact   Consciousness:  alert and awake    Attention: attention span appeared shorter than expected for age   Insight:  limited   Judgment: limited   Gait/Station: normal gait/station and normal balance   Motor Activity: no abnormal movements     Progress Toward Goals: Patient remains compliant with medications and denies side effects. According to staff report reports Auditory hallucinations of voiced warning of danger coming to his family.       He remains scant and guarded with communication. He is observed mostly sleeping in his room. Social with select peers.  He denies Si or HI, or A/V hallucinations. He denies depressed mood.   Agrees to continue current treatment.    Recommended Treatment: Continue with group therapy, milieu therapy and occupational therapy.    Treatment plan, treatment progress and medication changes were reviewed  with nursing staff Observe progress over the weekend on current Zyprexa dosing 10 mg daily and 30 mg at bedtime  Consider cross taper to Loxapine next week if no progress or potentially to Clozaril if his insurance benefits are transferred to [place].     to follow  Continue all other medications.      Risks, benefits and possible side effects of Medications:   Risks, benefits, and possible side effects of medications explained to patient and patient verbalizes understanding.      Medications: all current active meds have been reviewed, continue current psychiatric medications, and current meds:   Current Facility-Administered Medications   Medication Dose Route Frequency    acetaminophen (TYLENOL) tablet 650 mg  650 mg Oral Q6H PRN    acetaminophen (TYLENOL) tablet 650 mg  650 mg Oral Q4H PRN    acetaminophen (TYLENOL) tablet 975 mg  975 mg Oral Q6H PRN    aluminum-magnesium hydroxide-simethicone (MAALOX) oral suspension 30 mL  30 mL Oral Q4H PRN    haloperidol lactate (HALDOL) injection 2.5 mg  2.5 mg Intramuscular Q4H PRN Max 4/day    And    LORazepam (ATIVAN) injection 1 mg  1 mg Intramuscular Q4H PRN Max 4/day    And    benztropine (COGENTIN) injection 0.5 mg  0.5 mg Intramuscular Q4H PRN Max 4/day    haloperidol lactate (HALDOL) injection 5 mg  5 mg Intramuscular Q4H PRN Max 4/day    And    LORazepam (ATIVAN) injection 2 mg  2 mg Intramuscular Q4H PRN Max 4/day    And    benztropine (COGENTIN) injection 1 mg  1 mg Intramuscular Q4H PRN Max 4/day    benztropine (COGENTIN) tablet 0.5 mg  0.5 mg Oral BID    benztropine (COGENTIN) tablet 1 mg  1 mg Oral Q4H PRN Max 6/day    bisacodyl (DULCOLAX) rectal suppository 10 mg  10 mg Rectal Daily PRN    cholecalciferol (VITAMIN D3) tablet 2,000 Units  2,000 Units Oral Daily    cyanocobalamin (VITAMIN B-12) tablet 1,000 mcg  1,000 mcg Oral Daily    hydrOXYzine HCL (ATARAX) tablet 50 mg  50 mg Oral Q6H PRN Max 4/day    Or    diphenhydrAMINE (BENADRYL)  injection 50 mg  50 mg Intramuscular Q6H PRN    escitalopram (LEXAPRO) tablet 20 mg  20 mg Oral Daily    haloperidol (HALDOL) tablet 1 mg  1 mg Oral Q6H PRN    haloperidol (HALDOL) tablet 2.5 mg  2.5 mg Oral Q4H PRN Max 4/day    haloperidol (HALDOL) tablet 5 mg  5 mg Oral Q4H PRN Max 4/day    hydrocortisone 1 % cream   Topical 4x Daily PRN    hydrOXYzine HCL (ATARAX) tablet 100 mg  100 mg Oral Q6H PRN Max 4/day    Or    LORazepam (ATIVAN) injection 2 mg  2 mg Intramuscular Q6H PRN    hydrOXYzine HCL (ATARAX) tablet 25 mg  25 mg Oral Q6H PRN Max 4/day    lithium carbonate (LITHOBID) CR tablet 1,200 mg  1,200 mg Oral HS    lithium carbonate (LITHOBID) CR tablet 300 mg  300 mg Oral Daily    melatonin tablet 3 mg  3 mg Oral HS    mirtazapine (REMERON) tablet 45 mg  45 mg Oral HS    nicotine polacrilex (NICORETTE) gum 4 mg  4 mg Oral Q2H PRN    OLANZapine (ZyPREXA) tablet 10 mg  10 mg Oral Daily    OLANZapine (ZyPREXA) tablet 30 mg  30 mg Oral HS    polyethylene glycol (MIRALAX) packet 17 g  17 g Oral Daily PRN    polyethylene glycol (MIRALAX) packet 17 g  17 g Oral Daily    senna-docusate sodium (SENOKOT S) 8.6-50 mg per tablet 1 tablet  1 tablet Oral Daily PRN    traZODone (DESYREL) tablet 50 mg  50 mg Oral HS PRN   .    Labs: I have personally reviewed all pertinent laboratory/tests results. Most Recent Labs:   Lab Results   Component Value Date    WBC 10.15 11/15/2023    RBC 5.32 11/15/2023    HGB 12.8 11/15/2023    HCT 42.4 11/15/2023     11/15/2023    RDW 14.0 11/15/2023    NEUTROABS 6.25 11/15/2023    SODIUM 137 12/15/2023    K 4.2 12/15/2023     12/15/2023    CO2 29 12/15/2023    BUN 10 12/15/2023    CREATININE 0.85 12/15/2023    GLUC 101 12/15/2023    GLUF 101 (H) 12/15/2023    CALCIUM 9.6 12/15/2023    AST 22 11/15/2023    ALT 40 11/15/2023    ALKPHOS 69 11/15/2023    TP 6.5 11/15/2023    ALB 4.1 11/15/2023    TBILI 0.45 11/15/2023    CHOLESTEROL 182 12/15/2023    HDL 56 12/15/2023    TRIG 118  12/15/2023    LDLCALC 102 (H) 12/15/2023    NONHDLC 126 12/15/2023    LITHIUM 0.6 12/13/2023    [id number] 1.062 11/15/2023    HGBA1C 4.9 11/15/2023    EAG 94 11/15/2023       Counseling / Coordination of Care  Total floor / unit time spent today n/a minutes. Greater than 50% of total time was spent with the patient and / or family counseling and / or coordination of care. A description of the counseling / coordination of care:

## 2023-12-25 PROCEDURE — 99232 SBSQ HOSP IP/OBS MODERATE 35: CPT | Performed by: PSYCHIATRY & NEUROLOGY

## 2023-12-25 RX ADMIN — MELATONIN TAB 3 MG 3 MG: 3 TAB at 21:09

## 2023-12-25 RX ADMIN — BENZTROPINE MESYLATE 0.5 MG: 0.5 TABLET ORAL at 17:14

## 2023-12-25 RX ADMIN — POLYETHYLENE GLYCOL 3350 17 G: 17 POWDER, FOR SOLUTION ORAL at 08:15

## 2023-12-25 RX ADMIN — LITHIUM CARBONATE 1200 MG: 450 TABLET, EXTENDED RELEASE ORAL at 21:09

## 2023-12-25 RX ADMIN — LITHIUM CARBONATE 300 MG: 300 TABLET, EXTENDED RELEASE ORAL at 08:15

## 2023-12-25 RX ADMIN — NICOTINE POLACRILEX 4 MG: 4 GUM, CHEWING ORAL at 09:38

## 2023-12-25 RX ADMIN — ESCITALOPRAM OXALATE 20 MG: 10 TABLET ORAL at 08:15

## 2023-12-25 RX ADMIN — CYANOCOBALAMIN TAB 1000 MCG 1000 MCG: 1000 TAB at 08:16

## 2023-12-25 RX ADMIN — MIRTAZAPINE 45 MG: 30 TABLET, FILM COATED ORAL at 21:08

## 2023-12-25 RX ADMIN — NICOTINE POLACRILEX 4 MG: 4 GUM, CHEWING ORAL at 14:11

## 2023-12-25 RX ADMIN — BENZTROPINE MESYLATE 0.5 MG: 0.5 TABLET ORAL at 08:15

## 2023-12-25 RX ADMIN — CHOLECALCIFEROL TAB 25 MCG (1000 UNIT) 2000 UNITS: 25 TAB at 08:15

## 2023-12-25 RX ADMIN — OLANZAPINE 10 MG: 10 TABLET, FILM COATED ORAL at 08:16

## 2023-12-25 RX ADMIN — NICOTINE POLACRILEX 4 MG: 4 GUM, CHEWING ORAL at 18:55

## 2023-12-25 RX ADMIN — OLANZAPINE 30 MG: 10 TABLET, FILM COATED ORAL at 21:09

## 2023-12-25 NOTE — PROGRESS NOTES
"Progress Note - Behavioral Health     Alberto Berumen 28 y.o. male MRN: [id number]   Unit/Bed#: [facility] 383-02 Encounter: [id number]    Patient was seen and evaluated for continuity of care.  Per nursing report yesterday morning, patient was visible and social with select peers.  He reported auditory hallucinations of danger coming to his family per nursing documentation.  Per nighttime nursing report, patient was calm and cooperative on approach and reports ongoing hallucinations but is able to contract for safety on the unit. Per nursing report this morning, patient continues to report auditory hallucinations.  Patient was noted to be socializing with peers on the unit.  During the interview today, patient describes his mood as \"so-so.\"  He is unsure of how many hours he slept in total but denies any issues with his sleep overnight.  He continues to report ongoing auditory hallucinations that he states are intrusive and at times bothersome telling him that he is \"going to hell.\"  He states that he has been coping with his hallucinations through prayer.  He denies SI/HI and denies any plan or intent to harm himself or others.  He agrees to continue with his current medication regimen as prescribed.  Patient last reported having a bowel movement yesterday.  He continues to be medication adherent and denies medication side effects.    Sleep: normal, unsure of how many hours he slept last night  Appetite: normal  Medication side effects: No   ROS: no complaints, all other systems are negative    Mental Status Evaluation:    Appearance:  -American male, no acute distress, dressed casually, fair eye contact   Behavior:  cooperative, guarded, restless at times   Speech:  clear, coherent, scant, soft   Mood:  \"So-so\"   Affect:  constricted, at times, dysphoric   Thought Process:  slowing of thoughts, concrete   Associations: concrete associations   Thought Content:  some paranoia   Perceptual Disturbances: no visual " "hallucinations, does not appear responding to internal stimuli, auditory hallucinations with negative comments and of \"going to hell\", appears distracted, appears preoccupied   Risk Potential: Suicidal ideation - None, contracts for safety on the unit, would talk to staff if not feeling safe on the unit  Homicidal ideation - None  Potential for aggression - No   Sensorium:  oriented to person, place, and situation   Memory:  recent and remote memory grossly intact   Consciousness:  alert and awake   Attention/Concentration: decreased concentration and decreased attention span   Insight:  limited   Judgment: limited   Gait/Station: slow gait   Motor Activity: no abnormal movements     Vital signs in last 24 hours:    Temp:  [98.5 °F (36.9 °C)-98.6 °F (37 °C)] 98.6 °F (37 °C)  HR:  [58-99] 99  Resp:  [16-18] 16  BP: (119-153)/(71-89) 142/71    Laboratory results: I have personally reviewed all pertinent laboratory/tests results    Results from the past 24 hours: No results found for this or any previous visit (from the past 24 hour(s)).  Most Recent Labs:   Lab Results   Component Value Date    WBC 10.15 11/15/2023    RBC 5.32 11/15/2023    HGB 12.8 11/15/2023    HCT 42.4 11/15/2023     11/15/2023    RDW 14.0 11/15/2023    NEUTROABS 6.25 11/15/2023    SODIUM 137 12/15/2023    K 4.2 12/15/2023     12/15/2023    CO2 29 12/15/2023    BUN 10 12/15/2023    CREATININE 0.85 12/15/2023    GLUC 101 12/15/2023    CALCIUM 9.6 12/15/2023    AST 22 11/15/2023    ALT 40 11/15/2023    ALKPHOS 69 11/15/2023    TP 6.5 11/15/2023    ALB 4.1 11/15/2023    TBILI 0.45 11/15/2023    CHOLESTEROL 182 12/15/2023    HDL 56 12/15/2023    TRIG 118 12/15/2023    LDLCALC 102 (H) 12/15/2023    NONHDLC 126 12/15/2023    LITHIUM 0.6 12/13/2023    [id number] 1.062 11/15/2023    SYPHILISAB Non-reactive 11/18/2023    HGBA1C 4.9 11/15/2023    EAG 94 11/15/2023         Progress Toward Goals: Mostly unchanged, continues to have ongoing " auditory hallucinations that are intrusive    Assessment/Plan   Principal Problem:    Schizoaffective disorder, bipolar type (HCC)  Active Problems:    GERD (gastroesophageal reflux disease)    Medical clearance for psychiatric admission    Tobacco abuse    T wave inversion in EKG    Chronic idiopathic constipation    Vitamin B 12 deficiency    Vitamin D deficiency      Recommended Treatment:     Planned medication and treatment changes:    All current active medications have been reviewed  Encourage group therapy, milieu therapy and occupational therapy  Behavioral Health checks every 7 minutes    -Continue current medication regimen of Cogentin 0.5 mg twice daily for EPS, Lexapro 20 mg daily for mood and anxiety, Lithobid 300 mg daily, 1200 mg at bedtime for mood stabilization (most recent lithium level on 12/13/2023 was noted to be 0.6), Remeron 45 mg at bedtime for mood, Zyprexa 10 mg daily, 30 mg at bedtime for psychosis    - Consider cross taper to clozapine if able to do so with patient's insurance versus considering second agent if patient's symptoms do not remit on current dosing of Zyprexa; most recent EKG on 12/8/2023 showed a QTc of 435ms    - Continue medical management per SLIM    Current Facility-Administered Medications   Medication Dose Route Frequency Provider Last Rate    acetaminophen  650 mg Oral Q6H PRN Yasmin Harvey MD      acetaminophen  650 mg Oral Q4H PRN Yasmin Harvey MD      acetaminophen  975 mg Oral Q6H PRN Yasmin Harvey MD      aluminum-magnesium hydroxide-simethicone  30 mL Oral Q4H PRN Yasmin Harvey MD      haloperidol lactate  2.5 mg Intramuscular Q4H PRN Max 4/day Yasmin Harvey MD      And    LORazepam  1 mg Intramuscular Q4H PRN Max 4/day Yasmin Harvey MD      And    benztropine  0.5 mg Intramuscular Q4H PRN Max 4/day Yasmin Harvey MD      haloperidol lactate  5 mg Intramuscular Q4H PRN Max 4/day Yasmin Harvey MD      And    LORazepam  2 mg  Intramuscular Q4H PRN Max 4/day Yasmin Harvey MD      And    benztropine  1 mg Intramuscular Q4H PRN Max 4/day Yasmin Harvey MD      benztropine  0.5 mg Oral BID La Flores MD      benztropine  1 mg Oral Q4H PRN Max 6/day Yasmin Harvey MD      bisacodyl  10 mg Rectal Daily PRN Yasmin Harvey MD      cholecalciferol  2,000 Units Oral Daily HOLLI Monge      cyanocobalamin  1,000 mcg Oral Daily HOLLI Monge      hydrOXYzine HCL  50 mg Oral Q6H PRN Max 4/day Yasmin Harvey MD      Or    diphenhydrAMINE  50 mg Intramuscular Q6H PRN Yasmin Harvey MD      escitalopram  20 mg Oral Daily La Flores MD      haloperidol  1 mg Oral Q6H PRN Yasmin Harvey MD      haloperidol  2.5 mg Oral Q4H PRN Max 4/day Yasmin Harvey MD      haloperidol  5 mg Oral Q4H PRN Max 4/day Yasmin Harvey MD      hydrocortisone   Topical 4x Daily PRN Nisreen Khalil MD      hydrOXYzine HCL  100 mg Oral Q6H PRN Max 4/day Yasmin Harvey MD      Or    LORazepam  2 mg Intramuscular Q6H PRN Yasmin Harvey MD      hydrOXYzine HCL  25 mg Oral Q6H PRN Max 4/day Yasmin Harvey MD      lithium carbonate  1,200 mg Oral HS HOLLI Anderson      lithium carbonate  300 mg Oral Daily HOLLI Anderson      melatonin  3 mg Oral HS Yasmin Harvey MD      mirtazapine  45 mg Oral HS La Flores MD      nicotine polacrilex  4 mg Oral Q2H PRN Yasmin Harvey MD      OLANZapine  10 mg Oral Daily HOLLI Anderson      OLANZapine  30 mg Oral HS Verónica Jo PASORAYA      polyethylene glycol  17 g Oral Daily PRN Yasmin Harvey MD      polyethylene glycol  17 g Oral Daily HOLLI Monge      senna-docusate sodium  1 tablet Oral Daily PRN Yasmin Harvey MD      traZODone  50 mg Oral HS PRN Yasmin Harvey MD       Risks / Benefits of Treatment:    Risks, benefits, and possible side effects of medications explained to patient. Patient has  limited understanding of risks and benefits of treatment at this time, but agrees to take medications as prescribed.    Counseling / Coordination of Care:    Total floor / unit time spent today 20 minutes. Greater than 50% of total time was spent with the patient and / or family counseling and / or coordination of care. A description of counseling / coordination of care:  Patient's progress discussed with staff in treatment team meeting.  Medications, treatment progress and treatment plan reviewed with patient.  Educated on importance of medication and treatment compliance.  Reassurance and supportive therapy provided.  Encouraged participation in milieu and group therapy on the unit.    Donnie William MD 12/25/23

## 2023-12-25 NOTE — NURSING NOTE
Pt is calm and cooperative upon approach. Pt was observed resting in bed until 2030, received snack, then was socializing with peers. Pt reports ongoing AH, feels safe on the unit. Denies SI, HI, VH, and pain. Compliant with HS medication and snack. Denies unmet needs/ concerns at this time.

## 2023-12-25 NOTE — NURSING NOTE
Pt verbally denies SI and HI, reports continued auditory hallucinations that are unchanged but not excessively bothersome to pt. Positive eye contact, socialization with peers, calm, visible in milieu.

## 2023-12-26 PROCEDURE — 99232 SBSQ HOSP IP/OBS MODERATE 35: CPT | Performed by: PSYCHIATRY & NEUROLOGY

## 2023-12-26 RX ORDER — LOXAPINE SUCCINATE 10 MG/1
10 TABLET ORAL 2 TIMES DAILY
Status: DISCONTINUED | OUTPATIENT
Start: 2023-12-26 | End: 2023-12-28

## 2023-12-26 RX ADMIN — CHOLECALCIFEROL TAB 25 MCG (1000 UNIT) 2000 UNITS: 25 TAB at 08:37

## 2023-12-26 RX ADMIN — OLANZAPINE 10 MG: 10 TABLET, FILM COATED ORAL at 08:37

## 2023-12-26 RX ADMIN — NICOTINE POLACRILEX 4 MG: 4 GUM, CHEWING ORAL at 08:51

## 2023-12-26 RX ADMIN — BENZTROPINE MESYLATE 0.5 MG: 0.5 TABLET ORAL at 17:18

## 2023-12-26 RX ADMIN — POLYETHYLENE GLYCOL 3350 17 G: 17 POWDER, FOR SOLUTION ORAL at 08:36

## 2023-12-26 RX ADMIN — LITHIUM CARBONATE 300 MG: 300 TABLET, EXTENDED RELEASE ORAL at 08:37

## 2023-12-26 RX ADMIN — ESCITALOPRAM OXALATE 20 MG: 10 TABLET ORAL at 08:36

## 2023-12-26 RX ADMIN — NICOTINE POLACRILEX 4 MG: 4 GUM, CHEWING ORAL at 14:33

## 2023-12-26 RX ADMIN — CYANOCOBALAMIN TAB 1000 MCG 1000 MCG: 1000 TAB at 08:37

## 2023-12-26 RX ADMIN — MIRTAZAPINE 45 MG: 30 TABLET, FILM COATED ORAL at 21:15

## 2023-12-26 RX ADMIN — MELATONIN TAB 3 MG 3 MG: 3 TAB at 21:15

## 2023-12-26 RX ADMIN — LOXAPINE 10 MG: 10 CAPSULE ORAL at 17:18

## 2023-12-26 RX ADMIN — OLANZAPINE 25 MG: 5 TABLET, FILM COATED ORAL at 21:14

## 2023-12-26 RX ADMIN — BENZTROPINE MESYLATE 0.5 MG: 0.5 TABLET ORAL at 08:37

## 2023-12-26 RX ADMIN — LITHIUM CARBONATE 1200 MG: 450 TABLET, EXTENDED RELEASE ORAL at 21:15

## 2023-12-26 NOTE — NURSING NOTE
"Patient was visible most of the evening in the dayroom. Reports depression and auditory hallucinations and when asked what the voices are saying stated, \"that they are going to kill me and my family\". Received and was compliant with scheduled night time medications and encouraged patient to come to staff if voices worsen. Denies anxiety, SI, or HI.    Denies any unmet needs or concerns at this time.   "

## 2023-12-26 NOTE — PROGRESS NOTES
12/26/23 1519   Team Meeting   Meeting Type Daily Rounds   Team Members Present   Team Members Present Physician;Nurse;   Physician Team Member Mark   Nursing Team Member Giancarlo   Care Management Team Member Miguelito   Patient/Family Present   Patient Present No   Patient's Family Present No     Patient currently holds 201 status. Patient is medication and meal compliant. Patient denies all but continues to report AH. Patient to continue on Lexapro 20 mg daily, Zyprexa 10 daily and 25mg HS. Patient discharge date and plan to be determined.

## 2023-12-26 NOTE — NURSING NOTE
"Pt is visible on the unit and social with select peers. Pt denies SI, HI,and VH, Pt is reporting AH stating \"that they are going to kill me and my family.\" Pt is stating that they are safe on the unit and does not want to harm self. Pt is medication and meal compliant. Denies any needs at this time.  "

## 2023-12-26 NOTE — PROGRESS NOTES
"Progress Note - Behavioral Health     Alberto Berumen 28 y.o. male MRN: [id number]   Unit/Bed#: -02 Encounter: [id number]    Behavior over the last 24 hours: unchanged    Alberto was seen today in follow-up. Per staff, patient relatively calm, cooperative but still endorsing AH of voices, \"telling me they are going to kill my family.\" As of now, currently on max dose of Zyprexa and haldol has been tapered off completely. We did discuss previously the option of retrial of a different antipsychotic in combination with Zyprexa. Unfortunately at this time, ETC nor Clozaril is an option at this time due to issues with insurance. Seen today in bed, Alberto is complaining of ongoing AH which are still very bothersome to him. We discussed given partial response to Zyprexa, discussed option of switching antipsychotics for improved symptom relief. Is is open to discuss options for this such as Loxapine. Did mention good response to Risperdal in the past as well.     Sleep: normal  Appetite: normal  Medication side effects: No   ROS: no complaints, all other systems are negative    Mental Status Evaluation:    Appearance:  age appropriate, marginal hygiene   Behavior:  cooperative, calm, guarded   Speech:  scant   Mood:  anxious   Affect:  flat   Thought Process:  coherent   Associations: concrete associations   Thought Content:  no overt delusions   Perceptual Disturbances: auditory hallucinations   Risk Potential: Suicidal ideation - None at present  Homicidal ideation - None at present  Potential for aggression - No   Sensorium:  oriented to person, place, and time/date   Memory:  recent and remote memory grossly intact   Consciousness:  alert and awake   Attention/Concentration: attention span and concentration appear shorter than expected for age   Insight:  limited   Judgment: limited   Gait/Station: normal gait/station   Motor Activity: no abnormal movements     Vital signs in last 24 hours:    Temp:  [98.6 °F " (37 °C)] 98.6 °F (37 °C)  HR:  [] 96  Resp:  [16] 16  BP: (147-155)/(76-82) 155/82    Laboratory results: I have personally reviewed all pertinent laboratory/tests results    Results from the past 24 hours: No results found for this or any previous visit (from the past 24 hour(s)).  Most Recent Labs:   Lab Results   Component Value Date    WBC 10.15 11/15/2023    RBC 5.32 11/15/2023    HGB 12.8 11/15/2023    HCT 42.4 11/15/2023     11/15/2023    RDW 14.0 11/15/2023    NEUTROABS 6.25 11/15/2023    SODIUM 137 12/15/2023    K 4.2 12/15/2023     12/15/2023    CO2 29 12/15/2023    BUN 10 12/15/2023    CREATININE 0.85 12/15/2023    GLUC 101 12/15/2023    CALCIUM 9.6 12/15/2023    AST 22 11/15/2023    ALT 40 11/15/2023    ALKPHOS 69 11/15/2023    TP 6.5 11/15/2023    ALB 4.1 11/15/2023    TBILI 0.45 11/15/2023    CHOLESTEROL 182 12/15/2023    HDL 56 12/15/2023    TRIG 118 12/15/2023    LDLCALC 102 (H) 12/15/2023    NONHDLC 126 12/15/2023    LITHIUM 0.6 12/13/2023    [id number] 1.062 11/15/2023    SYPHILISAB Non-reactive 11/18/2023    HGBA1C 4.9 11/15/2023    EAG 94 11/15/2023       Progress Toward Goals: progressing    Assessment/Plan   Principal Problem:    Schizoaffective disorder, bipolar type (HCC)  Active Problems:    GERD (gastroesophageal reflux disease)    Medical clearance for psychiatric admission    Tobacco abuse    T wave inversion in EKG    Chronic idiopathic constipation    Vitamin B 12 deficiency    Vitamin D deficiency      Recommended Treatment:     Planned medication and treatment changes:    All current active medications have been reviewed  Encourage group therapy, milieu therapy and occupational therapy  Behavioral Health checks every 7 minutes    Plan to cross titrate Loxapine with Zyprexa given lack of response to Zyprexa alone. Will most likely need a second SGA in the future in combination with Loxapine, however will hold off to prevent the use of 3 antipsychotics at once. Will  reduce nighttime Zyprexa to 25 mg QHS (will continue daily dose of 10 mg QAM) and to start Loxapine 10 mg BID starting tonight  Continue all other medications  DC planning ongoing - continues to require inpatient hospitalization due to ongoing demeaning AH     Current Facility-Administered Medications   Medication Dose Route Frequency Provider Last Rate    acetaminophen  650 mg Oral Q6H PRN Yasmin Harvey MD      acetaminophen  650 mg Oral Q4H PRN Yasmin Harvey MD      acetaminophen  975 mg Oral Q6H PRN Yasmin Harvey MD      aluminum-magnesium hydroxide-simethicone  30 mL Oral Q4H PRN Yasmin Harvey MD      haloperidol lactate  2.5 mg Intramuscular Q4H PRN Max 4/day Yasmin Harvey MD      And    LORazepam  1 mg Intramuscular Q4H PRN Max 4/day Yasmin Harvey MD      And    benztropine  0.5 mg Intramuscular Q4H PRN Max 4/day Yasmin Harvey MD      haloperidol lactate  5 mg Intramuscular Q4H PRN Max 4/day Yasmin Harvey MD      And    LORazepam  2 mg Intramuscular Q4H PRN Max 4/day Yasmin Harvey MD      And    benztropine  1 mg Intramuscular Q4H PRN Max 4/day Yasmin Harvey MD      benztropine  0.5 mg Oral BID La Flores MD      benztropine  1 mg Oral Q4H PRN Max 6/day Yasmin Harvey MD      bisacodyl  10 mg Rectal Daily PRN Yasmin Harvey MD      cholecalciferol  2,000 Units Oral Daily HOLLI Monge      cyanocobalamin  1,000 mcg Oral Daily HOLLI Monge      hydrOXYzine HCL  50 mg Oral Q6H PRN Max 4/day Yasmin Harvey MD      Or    diphenhydrAMINE  50 mg Intramuscular Q6H PRN Yasmin Harvey MD      escitalopram  20 mg Oral Daily La Flores MD      haloperidol  1 mg Oral Q6H PRN Yasmin Harvey MD      haloperidol  2.5 mg Oral Q4H PRN Max 4/day Yasmin Harvey MD      haloperidol  5 mg Oral Q4H PRN Max 4/day Yasmin Harvey MD      hydrocortisone   Topical 4x Daily PRN Nisreen Khalil MD      hydrOXYzine HCL  100  mg Oral Q6H PRN Max 4/day Yasmin Harvey MD      Or    LORazepam  2 mg Intramuscular Q6H PRN Yasmin Harvey MD      hydrOXYzine HCL  25 mg Oral Q6H PRN Max 4/day Yasmin Harvey MD      lithium carbonate  1,200 mg Oral HS Prisca Villafuerte, HOLLI      lithium carbonate  300 mg Oral Daily HOLLI Anderson      melatonin  3 mg Oral HS Yasmin Harvey MD      mirtazapine  45 mg Oral HS La Flores MD      nicotine polacrilex  4 mg Oral Q2H PRN Yasmin Harvey MD      OLANZapine  10 mg Oral Daily HOLLI Anderson      OLANZapine  30 mg Oral HS Verónica Jo PA-C      polyethylene glycol  17 g Oral Daily PRN Yasmin Harvey MD      polyethylene glycol  17 g Oral Daily HOLLI Monge      senna-docusate sodium  1 tablet Oral Daily PRN Yasmin Harvey MD      traZODone  50 mg Oral HS PRN Yasmin Harvey MD       Risks / Benefits of Treatment:    Risks, benefits, and possible side effects of medications explained to patient and patient verbalizes understanding and agreement for treatment.    Counseling / Coordination of Care:    Total floor / unit time spent today 20 minutes. Greater than 50% of total time was spent with the patient and / or family counseling and / or coordination of care. A description of counseling / coordination of care:  Patient's progress discussed with staff in treatment team meeting.  Medications, treatment progress and treatment plan reviewed with patient.    Verónica Jo PA-C 12/26/23

## 2023-12-27 PROCEDURE — 99232 SBSQ HOSP IP/OBS MODERATE 35: CPT | Performed by: PSYCHIATRY & NEUROLOGY

## 2023-12-27 RX ORDER — OLANZAPINE 10 MG/1
20 TABLET ORAL
Status: DISCONTINUED | OUTPATIENT
Start: 2023-12-27 | End: 2023-12-28

## 2023-12-27 RX ADMIN — BENZTROPINE MESYLATE 0.5 MG: 0.5 TABLET ORAL at 17:22

## 2023-12-27 RX ADMIN — LOXAPINE 10 MG: 10 CAPSULE ORAL at 17:22

## 2023-12-27 RX ADMIN — LITHIUM CARBONATE 300 MG: 300 TABLET, EXTENDED RELEASE ORAL at 08:09

## 2023-12-27 RX ADMIN — OLANZAPINE 20 MG: 10 TABLET, FILM COATED ORAL at 21:36

## 2023-12-27 RX ADMIN — NICOTINE POLACRILEX 4 MG: 4 GUM, CHEWING ORAL at 18:20

## 2023-12-27 RX ADMIN — NICOTINE POLACRILEX 4 MG: 4 GUM, CHEWING ORAL at 14:10

## 2023-12-27 RX ADMIN — POLYETHYLENE GLYCOL 3350 17 G: 17 POWDER, FOR SOLUTION ORAL at 08:08

## 2023-12-27 RX ADMIN — LOXAPINE 10 MG: 10 CAPSULE ORAL at 08:09

## 2023-12-27 RX ADMIN — BENZTROPINE MESYLATE 0.5 MG: 0.5 TABLET ORAL at 08:09

## 2023-12-27 RX ADMIN — MELATONIN TAB 3 MG 3 MG: 3 TAB at 21:34

## 2023-12-27 RX ADMIN — MIRTAZAPINE 45 MG: 30 TABLET, FILM COATED ORAL at 21:35

## 2023-12-27 RX ADMIN — NICOTINE POLACRILEX 4 MG: 4 GUM, CHEWING ORAL at 09:39

## 2023-12-27 RX ADMIN — LITHIUM CARBONATE 1200 MG: 450 TABLET, EXTENDED RELEASE ORAL at 21:34

## 2023-12-27 RX ADMIN — ESCITALOPRAM OXALATE 20 MG: 10 TABLET ORAL at 08:09

## 2023-12-27 RX ADMIN — OLANZAPINE 10 MG: 10 TABLET, FILM COATED ORAL at 08:09

## 2023-12-27 RX ADMIN — CHOLECALCIFEROL TAB 25 MCG (1000 UNIT) 2000 UNITS: 25 TAB at 08:09

## 2023-12-27 RX ADMIN — CYANOCOBALAMIN TAB 1000 MCG 1000 MCG: 1000 TAB at 08:09

## 2023-12-27 NOTE — NURSING NOTE
Pt is social and visible on the unit. Pt is attending groups. Pt is still reporting AH. Pt denies SI, HI, and VH. Pt is medication and meal compliant. Denies any needs at this time.

## 2023-12-27 NOTE — NURSING NOTE
Patient calm and pleasant on approach denies SI/HI/AVH, patient visible in dayroom socializing with peers watching tv. Compliant with med and unit routine.

## 2023-12-27 NOTE — PROGRESS NOTES
12/27/23 1213   Team Meeting   Meeting Type Daily Rounds   Team Members Present   Team Members Present Physician;Nurse;   Physician Team Member Mark   Nursing Team Member Giancarlo   Care Management Team Member Miguelito   Patient/Family Present   Patient Present No   Patient's Family Present No     Patient currently holds 201 status. Patient is medication and meal compliant. Patient denies all but continues to report AH. Patient to start on Loxapine 10 mg BID, and continue  Zyprexa 10 daily and 20mg HS. Patient discharge date and plan to be determined.

## 2023-12-27 NOTE — PROGRESS NOTES
Progress Note - Behavioral Health     Alberto Berumen 28 y.o. male MRN: [id number]   Unit/Bed#: [facility] 383-02 Encounter: [id number]    Behavior over the last 24 hours: unchanged.     Alberto was seen today in follow-up.  Per staff, he continues to remain calm and cooperative but is still endorsing distressing auditory hallucinations which often make reference to harm his family.  He is seen out in the milieu, agreeable to walk with writer.  He is still endorsing the usual auditory hallucinations as mentioned above.  States that they have increased in frequency and remain bothersome.  He is able to contract for safety in hospital.  I discussed with him in detail cross-taper between Zyprexa and loxapine which he was in agreement with.  He is currently tolerating without side effects.  Last bowel movement this morning.  Despite ongoing auditory hallucinations, offers no worsening depressed mood or suicidal ideations.    Sleep: normal  Appetite: normal  Medication side effects: No   ROS: no complaints, all other systems are negative    Mental Status Evaluation:    Appearance:  marginal hygiene   Behavior:  cooperative, calm   Speech:  scant, increased latency of response   Mood:  anxious   Affect:  mood-congruent   Thought Process:  perseverative, concrete   Associations: concrete associations   Thought Content:  no overt delusions   Perceptual Disturbances: auditory hallucinations   Risk Potential: Suicidal ideation - None at present  Homicidal ideation - None at present  Potential for aggression - No   Sensorium:  oriented to person, place, and time/date   Memory:  recent and remote memory grossly intact   Consciousness:  alert and awake   Attention/Concentration: attention span and concentration are age appropriate   Insight:  limited   Judgment: limited   Gait/Station: normal gait/station   Motor Activity: no abnormal movements     Vital signs in last 24 hours:    Temp:  [98.5 °F (36.9 °C)-98.6 °F (37 °C)] 98.6 °F (37  °C)  HR:  [] 97  Resp:  [16] 16  BP: (136-143)/(88-89) 136/89    Laboratory results: I have personally reviewed all pertinent laboratory/tests results    Results from the past 24 hours: No results found for this or any previous visit (from the past 24 hour(s)).  Most Recent Labs:   Lab Results   Component Value Date    WBC 10.15 11/15/2023    RBC 5.32 11/15/2023    HGB 12.8 11/15/2023    HCT 42.4 11/15/2023     11/15/2023    RDW 14.0 11/15/2023    NEUTROABS 6.25 11/15/2023    SODIUM 137 12/15/2023    K 4.2 12/15/2023     12/15/2023    CO2 29 12/15/2023    BUN 10 12/15/2023    CREATININE 0.85 12/15/2023    GLUC 101 12/15/2023    CALCIUM 9.6 12/15/2023    AST 22 11/15/2023    ALT 40 11/15/2023    ALKPHOS 69 11/15/2023    TP 6.5 11/15/2023    ALB 4.1 11/15/2023    TBILI 0.45 11/15/2023    CHOLESTEROL 182 12/15/2023    HDL 56 12/15/2023    TRIG 118 12/15/2023    LDLCALC 102 (H) 12/15/2023    NONHDLC 126 12/15/2023    LITHIUM 0.6 12/13/2023    [id number] 1.062 11/15/2023    SYPHILISAB Non-reactive 11/18/2023    HGBA1C 4.9 11/15/2023    EAG 94 11/15/2023       Progress Toward Goals: progressing    Assessment/Plan   Principal Problem:    Schizoaffective disorder, bipolar type (HCC)  Active Problems:    GERD (gastroesophageal reflux disease)    Medical clearance for psychiatric admission    Tobacco abuse    T wave inversion in EKG    Chronic idiopathic constipation    Vitamin B 12 deficiency    Vitamin D deficiency      Recommended Treatment:     Planned medication and treatment changes:    All current active medications have been reviewed  Encourage group therapy, milieu therapy and occupational therapy  Behavioral Health checks every 7 minutes  Decrease nighttime Zyprexa to 20 mg QHS, continue daytime dose of 10 mg QAM  Continue gradual titration of loxapine, we will hold off on any adjustments today (will most likely increase to 10 mg QAM & 20 mg QHS tomorrow)  Continue all other medications  DC  planning ongoing -continues to require inpatient hospitalization at this time for ongoing medication adjustments for psychosis    Current Facility-Administered Medications   Medication Dose Route Frequency Provider Last Rate    acetaminophen  650 mg Oral Q6H PRN Yasmin Harvey MD      acetaminophen  650 mg Oral Q4H PRN Yasmin Harvey MD      acetaminophen  975 mg Oral Q6H PRN Yasmin Harvey MD      aluminum-magnesium hydroxide-simethicone  30 mL Oral Q4H PRN Yasmin Harvey MD      haloperidol lactate  2.5 mg Intramuscular Q4H PRN Max 4/day Yasmin Harvey MD      And    LORazepam  1 mg Intramuscular Q4H PRN Max 4/day Yasmin Harvey MD      And    benztropine  0.5 mg Intramuscular Q4H PRN Max 4/day Yasmin Harvey MD      haloperidol lactate  5 mg Intramuscular Q4H PRN Max 4/day Yasmin Harvey MD      And    LORazepam  2 mg Intramuscular Q4H PRN Max 4/day Yasmin Harvey MD      And    benztropine  1 mg Intramuscular Q4H PRN Max 4/day Yasmin Harvey MD      benztropine  0.5 mg Oral BID La Flores MD      benztropine  1 mg Oral Q4H PRN Max 6/day Yasmin Harvey MD      bisacodyl  10 mg Rectal Daily PRN Yasmin Harvey MD      cholecalciferol  2,000 Units Oral Daily HOLLI Monge      cyanocobalamin  1,000 mcg Oral Daily HOLLI Monge      hydrOXYzine HCL  50 mg Oral Q6H PRN Max 4/day Yasmin Harvey MD      Or    diphenhydrAMINE  50 mg Intramuscular Q6H PRN Yasmin Harvey MD      escitalopram  20 mg Oral Daily La Flores MD      haloperidol  1 mg Oral Q6H PRN Yasmin Harvey MD      haloperidol  2.5 mg Oral Q4H PRN Max 4/day Yasmin Harvey MD      haloperidol  5 mg Oral Q4H PRN Max 4/day Yasmin Harvey MD      hydrocortisone   Topical 4x Daily PRN Nisreen Khalil MD      hydrOXYzine HCL  100 mg Oral Q6H PRN Max 4/day Yasmin Harvey MD      Or    LORazepam  2 mg Intramuscular Q6H PRN Yasmin Harvey MD       hydrOXYzine HCL  25 mg Oral Q6H PRN Max 4/day Yasmin Harvey MD      lithium carbonate  1,200 mg Oral HS HOLLI Anderson      lithium carbonate  300 mg Oral Daily HOLLI Anderson      loxapine  10 mg Oral BID Verónica Jo PA-C      melatonin  3 mg Oral HS Yasmin Harvey MD      mirtazapine  45 mg Oral HS La Flores MD      nicotine polacrilex  4 mg Oral Q2H PRN Yasimn Harvey MD      OLANZapine  10 mg Oral Daily HOLLI Anderson      OLANZapine  20 mg Oral HS Verónica Jo PA-C      polyethylene glycol  17 g Oral Daily PRN Yasmin Harvey MD      polyethylene glycol  17 g Oral Daily HOLLI Monge      senna-docusate sodium  1 tablet Oral Daily PRN Yasmin Harvey MD      traZODone  50 mg Oral HS PRN Yasmin Harvey MD       Risks / Benefits of Treatment:    Risks, benefits, and possible side effects of medications explained to patient and patient verbalizes understanding and agreement for treatment.    Counseling / Coordination of Care:    Total floor / unit time spent today 15 minutes. Greater than 50% of total time was spent with the patient and / or family counseling and / or coordination of care. A description of counseling / coordination of care:  Patient's progress discussed with staff in treatment team meeting.  Medications, treatment progress and treatment plan reviewed with patient.    Verónica Jo PA-C 12/27/23

## 2023-12-27 NOTE — CASE MANAGEMENT
Writer and patient called [facility] office of assistance together. Writer explained that he closed his [place] benefit and would like to move forward with obtaining a [place] benefit for treatment. [facility]  Ms. Sol shared that she would expedite his application due to current circumstances. This request can be referred as ticket #[id number].

## 2023-12-27 NOTE — NURSING NOTE
"Pt is pleasant and cooperative upon approach. Pt reports AH, stating that: \"the voices are telling me they are going to kill me and my family\". Pt denies SI, HI, and VH. Pt states \"I don't want to hurt myself.\" Reassured pt to utilize staff, pt verbalized understanding. Pt is medication and meal compliant. Denies any needs at this time.  "

## 2023-12-28 PROCEDURE — 99232 SBSQ HOSP IP/OBS MODERATE 35: CPT | Performed by: PSYCHIATRY & NEUROLOGY

## 2023-12-28 RX ORDER — LOXAPINE SUCCINATE 10 MG/1
10 TABLET ORAL DAILY
Status: DISCONTINUED | OUTPATIENT
Start: 2023-12-29 | End: 2023-12-29

## 2023-12-28 RX ORDER — LOXAPINE SUCCINATE 10 MG/1
20 TABLET ORAL
Status: DISCONTINUED | OUTPATIENT
Start: 2023-12-28 | End: 2023-12-30

## 2023-12-28 RX ADMIN — LOXAPINE 10 MG: 10 CAPSULE ORAL at 08:24

## 2023-12-28 RX ADMIN — CHOLECALCIFEROL TAB 25 MCG (1000 UNIT) 2000 UNITS: 25 TAB at 08:24

## 2023-12-28 RX ADMIN — BENZTROPINE MESYLATE 0.5 MG: 0.5 TABLET ORAL at 17:04

## 2023-12-28 RX ADMIN — LITHIUM CARBONATE 300 MG: 300 TABLET, EXTENDED RELEASE ORAL at 08:24

## 2023-12-28 RX ADMIN — MIRTAZAPINE 45 MG: 30 TABLET, FILM COATED ORAL at 21:47

## 2023-12-28 RX ADMIN — OLANZAPINE 15 MG: 5 TABLET, FILM COATED ORAL at 21:48

## 2023-12-28 RX ADMIN — NICOTINE POLACRILEX 4 MG: 4 GUM, CHEWING ORAL at 13:41

## 2023-12-28 RX ADMIN — CYANOCOBALAMIN TAB 1000 MCG 1000 MCG: 1000 TAB at 08:24

## 2023-12-28 RX ADMIN — NICOTINE POLACRILEX 4 MG: 4 GUM, CHEWING ORAL at 08:58

## 2023-12-28 RX ADMIN — BENZTROPINE MESYLATE 0.5 MG: 0.5 TABLET ORAL at 08:24

## 2023-12-28 RX ADMIN — OLANZAPINE 10 MG: 10 TABLET, FILM COATED ORAL at 08:24

## 2023-12-28 RX ADMIN — LITHIUM CARBONATE 1200 MG: 450 TABLET, EXTENDED RELEASE ORAL at 21:47

## 2023-12-28 RX ADMIN — LOXAPINE 20 MG: 10 CAPSULE ORAL at 21:47

## 2023-12-28 RX ADMIN — ESCITALOPRAM OXALATE 20 MG: 10 TABLET ORAL at 08:24

## 2023-12-28 RX ADMIN — MELATONIN TAB 3 MG 3 MG: 3 TAB at 21:48

## 2023-12-28 RX ADMIN — POLYETHYLENE GLYCOL 3350 17 G: 17 POWDER, FOR SOLUTION ORAL at 08:23

## 2023-12-28 NOTE — PROGRESS NOTES
12/28/23 1414   Team Meeting   Meeting Type Daily Rounds   Team Members Present   Team Members Present Physician;Nurse;   Physician Team Member Mark   Nursing Team Member Giancarlo   Care Management Team Member Miguelito   Patient/Family Present   Patient Present No   Patient's Family Present No     Patient currently holds 201 status. Patient continues to report AH. Patient is medication and meal compliant. Patient to continue on loxapine 20 mg daily and loxapine 20 mg HS. Patient discharge date and time to be determined.

## 2023-12-28 NOTE — NURSING NOTE
Pt is calm and cooperative upon approach. Pt is visible on the unit and social with select peers. Pt is reporting voices, stating that they want to hurt him and his family. Pt states they feel safe here and will utilize staff. Denies SI, HI, and VH. Pt is medication and meal complaint. Denies any needs at this time.

## 2023-12-28 NOTE — NURSING NOTE
Patient pleasant on approach, denies all psychiatric symptoms at this time. Patient visible walking unit socializing with peers, compliant with unit routine.

## 2023-12-28 NOTE — PROGRESS NOTES
Progress Note - Behavioral Health     Alberto Berumen 28 y.o. male MRN: [id number]   Unit/Bed#: [facility] 383-02 Encounter: [id number]    Behavior over the last 24 hours: unchanged.     Alberto is seen in follow-up. Per staff, no acute issues reported overnight. IS still endorsing AH that are degrading and threatening to kill his family. He has been complaint with ongoing switch now from Zyprexa to Loxapine. Plan to gradually cross titrate doses further today. He denies any worsening side effects at this time. He states he is currently hearing voices telling him that they are going to harm him. He feels safe in hospital and is able to contract for safety. He does not want to harm himself or others. Is seen out in the unit frequently and attending select groups. No SI/HI/VH today.     Sleep: normal  Appetite: normal  Medication side effects: No   ROS: no complaints, all other systems are negative    Mental Status Evaluation:    Appearance:  age appropriate, casually dressed, marginal hygiene   Behavior:  cooperative, calm   Speech:  normal rate, normal volume, normal pitch   Mood:  anxious   Affect:  constricted   Thought Process:  goal directed, concrete   Associations: concrete associations   Thought Content:  no overt delusions   Perceptual Disturbances: auditory hallucinations   Risk Potential: Suicidal ideation - None at present  Homicidal ideation - None at present  Potential for aggression - No   Sensorium:  oriented to person, place, and time/date   Memory:  recent and remote memory grossly intact   Consciousness:  alert and awake   Attention/Concentration: attention span and concentration are age appropriate   Insight:  limited   Judgment: limited   Gait/Station: in bed   Motor Activity: no abnormal movements     Vital signs in last 24 hours:    Temp:  [98.5 °F (36.9 °C)-98.6 °F (37 °C)] 98.6 °F (37 °C)  HR:  [] 91  Resp:  [16-18] 16  BP: (133-150)/(82-88) 133/88    Laboratory results: I have personally  reviewed all pertinent laboratory/tests results    Results from the past 24 hours: No results found for this or any previous visit (from the past 24 hour(s)).  Most Recent Labs:   Lab Results   Component Value Date    WBC 10.15 11/15/2023    RBC 5.32 11/15/2023    HGB 12.8 11/15/2023    HCT 42.4 11/15/2023     11/15/2023    RDW 14.0 11/15/2023    NEUTROABS 6.25 11/15/2023    SODIUM 137 12/15/2023    K 4.2 12/15/2023     12/15/2023    CO2 29 12/15/2023    BUN 10 12/15/2023    CREATININE 0.85 12/15/2023    GLUC 101 12/15/2023    CALCIUM 9.6 12/15/2023    AST 22 11/15/2023    ALT 40 11/15/2023    ALKPHOS 69 11/15/2023    TP 6.5 11/15/2023    ALB 4.1 11/15/2023    TBILI 0.45 11/15/2023    CHOLESTEROL 182 12/15/2023    HDL 56 12/15/2023    TRIG 118 12/15/2023    LDLCALC 102 (H) 12/15/2023    NONHDLC 126 12/15/2023    LITHIUM 0.6 12/13/2023    [id number] 1.062 11/15/2023    SYPHILISAB Non-reactive 11/18/2023    HGBA1C 4.9 11/15/2023    EAG 94 11/15/2023       Progress Toward Goals: progressing    Assessment/Plan   Principal Problem:    Schizoaffective disorder, bipolar type (HCC)  Active Problems:    GERD (gastroesophageal reflux disease)    Medical clearance for psychiatric admission    Tobacco abuse    T wave inversion in EKG    Chronic idiopathic constipation    Vitamin B 12 deficiency    Vitamin D deficiency      Recommended Treatment:     Planned medication and treatment changes:    All current active medications have been reviewed  Encourage group therapy, milieu therapy and occupational therapy  Behavioral Health checks every 7 minutes    Continue cross taper between Zyprexa and Loxapine - will decrease nighttime dose of Zyprexa to 15 mg. Will further increase nighttime dose of Loxapine to 20 mg   Continue all other medications  Per CM, moving forward with [place] benefit for treatment & to expedite application  D/C planning ongoing    Current Facility-Administered Medications   Medication Dose Route  Frequency Provider Last Rate    acetaminophen  650 mg Oral Q6H PRN Yasmin Harvey MD      acetaminophen  650 mg Oral Q4H PRN Yasmin Harvey MD      acetaminophen  975 mg Oral Q6H PRN Yasmin Harvey MD      aluminum-magnesium hydroxide-simethicone  30 mL Oral Q4H PRN Yasmin Harvey MD      haloperidol lactate  2.5 mg Intramuscular Q4H PRN Max 4/day Yasmin Harvey MD      And    LORazepam  1 mg Intramuscular Q4H PRN Max 4/day Yasmin Harvey MD      And    benztropine  0.5 mg Intramuscular Q4H PRN Max 4/day Yasmin Harvey MD      haloperidol lactate  5 mg Intramuscular Q4H PRN Max 4/day Yasmin Harvey MD      And    LORazepam  2 mg Intramuscular Q4H PRN Max 4/day Yasmin Harvey MD      And    benztropine  1 mg Intramuscular Q4H PRN Max 4/day Yasmin Harvey MD      benztropine  0.5 mg Oral BID La Flores MD      benztropine  1 mg Oral Q4H PRN Max 6/day Yasmin Harvey MD      bisacodyl  10 mg Rectal Daily PRN Yasmin Harvey MD      cholecalciferol  2,000 Units Oral Daily HOLLI Monge      cyanocobalamin  1,000 mcg Oral Daily HOLLI Monge      hydrOXYzine HCL  50 mg Oral Q6H PRN Max 4/day Yasmin Harvey MD      Or    diphenhydrAMINE  50 mg Intramuscular Q6H PRN Yasmin Harvey MD      escitalopram  20 mg Oral Daily La Flores MD      haloperidol  1 mg Oral Q6H PRN Yasmin Harvey MD      haloperidol  2.5 mg Oral Q4H PRN Max 4/day Yasmin Harvey MD      haloperidol  5 mg Oral Q4H PRN Max 4/day Yasmin Harvey MD      hydrocortisone   Topical 4x Daily PRN Nisreen Khalil MD      hydrOXYzine HCL  100 mg Oral Q6H PRN Max 4/day Yasmin Harvey MD      Or    LORazepam  2 mg Intramuscular Q6H PRN Yasmin Harvey MD      hydrOXYzine HCL  25 mg Oral Q6H PRN Max 4/day Yasmin Harvey MD      lithium carbonate  1,200 mg Oral HS HOLLI Anderson      lithium carbonate  300 mg Oral Daily HOLLI Anderson      loxapine  10  mg Oral BID Verónica Jo PA-C      melatonin  3 mg Oral HS Yasmin Harvey MD      mirtazapine  45 mg Oral HS La Flores MD      nicotine polacrilex  4 mg Oral Q2H PRN Yasmin Harvey MD      OLANZapine  10 mg Oral Daily HOLLI Anderson      OLANZapine  20 mg Oral HS Verónica Jo PA-C      polyethylene glycol  17 g Oral Daily PRJOHN Harvey MD      polyethylene glycol  17 g Oral Daily HOLLI Monge      senna-docusate sodium  1 tablet Oral Daily PRJOHN Harvey MD      traZODone  50 mg Oral HS PRJOHN Harvey MD       Risks / Benefits of Treatment:    Risks, benefits, and possible side effects of medications explained to patient and patient verbalizes understanding and agreement for treatment.    Counseling / Coordination of Care:    Total floor / unit time spent today 15 minutes. Greater than 50% of total time was spent with the patient and / or family counseling and / or coordination of care. A description of counseling / coordination of care:  Patient's progress discussed with staff in treatment team meeting.  Medications, treatment progress and treatment plan reviewed with patient.    Verónica Jo PA-C 12/28/23

## 2023-12-28 NOTE — NURSING NOTE
Patient pleasant, denies all psychiatric symptoms, visible on unit compliant with medication and unit routine.

## 2023-12-29 PROCEDURE — 99232 SBSQ HOSP IP/OBS MODERATE 35: CPT | Performed by: PSYCHIATRY & NEUROLOGY

## 2023-12-29 RX ORDER — OLANZAPINE 10 MG/1
10 TABLET ORAL
Status: DISCONTINUED | OUTPATIENT
Start: 2023-12-29 | End: 2024-01-01

## 2023-12-29 RX ORDER — LOXAPINE SUCCINATE 10 MG/1
20 TABLET ORAL DAILY
Status: DISCONTINUED | OUTPATIENT
Start: 2023-12-30 | End: 2024-01-01

## 2023-12-29 RX ADMIN — LOXAPINE 20 MG: 10 CAPSULE ORAL at 21:18

## 2023-12-29 RX ADMIN — POLYETHYLENE GLYCOL 3350 17 G: 17 POWDER, FOR SOLUTION ORAL at 09:03

## 2023-12-29 RX ADMIN — MIRTAZAPINE 45 MG: 30 TABLET, FILM COATED ORAL at 21:18

## 2023-12-29 RX ADMIN — CYANOCOBALAMIN TAB 1000 MCG 1000 MCG: 1000 TAB at 09:10

## 2023-12-29 RX ADMIN — LOXAPINE 10 MG: 10 CAPSULE ORAL at 09:03

## 2023-12-29 RX ADMIN — LITHIUM CARBONATE 1200 MG: 450 TABLET, EXTENDED RELEASE ORAL at 21:18

## 2023-12-29 RX ADMIN — ESCITALOPRAM OXALATE 20 MG: 10 TABLET ORAL at 09:04

## 2023-12-29 RX ADMIN — CHOLECALCIFEROL TAB 25 MCG (1000 UNIT) 2000 UNITS: 25 TAB at 09:03

## 2023-12-29 RX ADMIN — OLANZAPINE 10 MG: 10 TABLET, FILM COATED ORAL at 09:04

## 2023-12-29 RX ADMIN — OLANZAPINE 10 MG: 10 TABLET, FILM COATED ORAL at 21:18

## 2023-12-29 RX ADMIN — NICOTINE POLACRILEX 4 MG: 4 GUM, CHEWING ORAL at 15:33

## 2023-12-29 RX ADMIN — LITHIUM CARBONATE 300 MG: 300 TABLET, EXTENDED RELEASE ORAL at 09:04

## 2023-12-29 RX ADMIN — BENZTROPINE MESYLATE 0.5 MG: 0.5 TABLET ORAL at 09:04

## 2023-12-29 RX ADMIN — MELATONIN TAB 3 MG 3 MG: 3 TAB at 21:18

## 2023-12-29 RX ADMIN — NICOTINE POLACRILEX 4 MG: 4 GUM, CHEWING ORAL at 09:28

## 2023-12-29 RX ADMIN — BENZTROPINE MESYLATE 0.5 MG: 0.5 TABLET ORAL at 18:18

## 2023-12-29 NOTE — NURSING NOTE
"Pt denies SI, HI and VH. Pt continues to report AH of voices stating they want to harm him and his family but reports the voices are \"slightly\" better. Pt is calm, cooperative and pleasant. Pt is visible in the milieu and social with select peers. Pt has no complaints or concerns. Medication and meal compliant.  "

## 2023-12-29 NOTE — NURSING NOTE
PT observed in bed, cooperative and calm upon approach. PT denies SI/HI/VH, continues to report AH to kill self. Compliant with meds and unit routine, no unmet needs at this time.

## 2023-12-29 NOTE — NURSING NOTE
"Pt calm pleasant and cooperative, Pt isolated in room in most of shift shift but also seen on the phone and in the dayroom watching TV, Pt denied all pain anxiety and depression, Pt denies HI and SI , Pt denied VH but did say \"I'm still hearing the voices were to hurt himself and others, Pt took all scheduled HS meds, Q7 min safety checks maintained  "

## 2023-12-29 NOTE — PROGRESS NOTES
12/29/23 8222   Team Meeting   Meeting Type Daily Rounds   Team Members Present   Team Members Present Physician;Nurse;   Physician Team Member Mark   Nursing Team Member Giancarlo   Care Management Team Member Miguelito   Patient/Family Present   Patient Present No   Patient's Family Present No     Patient currently holds 201 status. Patient continues to report AH. Patient is medication and meal compliant. Patient to continue on current medications. Provider to Increase Loxapine to 20 mg bid to help with psychotic symptoms  Decrease Zyprexa to 10 mg bid and continue with slow taper. Patient discharge date and plan to be determined.

## 2023-12-29 NOTE — PROGRESS NOTES
"Progress Note - Behavioral Health     Alberto Berumen 28 y.o. male MRN: [id number]   Unit/Bed#: [facility] 383-02 Encounter: [id number]    Behavior over the last 24 hours: unchanged.     Alberto was seen today in psychiatric follow-up.  He is seen out in the milieu.  Per staff, patient has been continuing to complain about auditory hallucinations of voices stating that they are going to harm him and his family.  He has been tolerating loxapine, despite residual hallucinations does state that they are, \"slightly\" better today.  We did review again his medications in detail and cross titration between Zyprexa and loxapine.  Will continue with dose adjustments today.  Despite condescending hallucinations, does not feel that he will intentionally harm himself or his family.  Often seen out in the community acting appropriately with peers and attending select group activities.    Sleep: normal  Appetite: normal  Medication side effects: No   ROS: no complaints, all other systems are negative    Mental Status Evaluation:    Appearance:  age appropriate, casually dressed, marginal hygiene   Behavior:  cooperative, calm   Speech:  normal rate, normal volume, normal pitch   Mood:  anxious   Affect:  blunted   Thought Process:  goal directed   Associations: concrete associations   Thought Content:  no overt delusions   Perceptual Disturbances: auditory hallucinations   Risk Potential: Suicidal ideation - None at present  Homicidal ideation - None at present  Potential for aggression - No   Sensorium:  oriented to person, place, and time/date   Memory:  recent and remote memory grossly intact   Consciousness:  alert and awake   Attention/Concentration: attention span and concentration are age appropriate   Insight:  limited   Judgment: limited   Gait/Station: normal gait/station   Motor Activity: no abnormal movements     Vital signs in last 24 hours:    Temp:  [97.7 °F (36.5 °C)-98.7 °F (37.1 °C)] 97.7 °F (36.5 °C)  HR:  [] " 95  Resp:  [18] 18  BP: (135-138)/(76-82) 138/82    Laboratory results: I have personally reviewed all pertinent laboratory/tests results    Results from the past 24 hours: No results found for this or any previous visit (from the past 24 hour(s)).  Most Recent Labs:   Lab Results   Component Value Date    WBC 10.15 11/15/2023    RBC 5.32 11/15/2023    HGB 12.8 11/15/2023    HCT 42.4 11/15/2023     11/15/2023    RDW 14.0 11/15/2023    NEUTROABS 6.25 11/15/2023    SODIUM 137 12/15/2023    K 4.2 12/15/2023     12/15/2023    CO2 29 12/15/2023    BUN 10 12/15/2023    CREATININE 0.85 12/15/2023    GLUC 101 12/15/2023    CALCIUM 9.6 12/15/2023    AST 22 11/15/2023    ALT 40 11/15/2023    ALKPHOS 69 11/15/2023    TP 6.5 11/15/2023    ALB 4.1 11/15/2023    TBILI 0.45 11/15/2023    CHOLESTEROL 182 12/15/2023    HDL 56 12/15/2023    TRIG 118 12/15/2023    LDLCALC 102 (H) 12/15/2023    NONHDLC 126 12/15/2023    LITHIUM 0.6 12/13/2023    [id number] 1.062 11/15/2023    SYPHILISAB Non-reactive 11/18/2023    HGBA1C 4.9 11/15/2023    EAG 94 11/15/2023       Progress Toward Goals: progressing    Assessment/Plan   Principal Problem:    Schizoaffective disorder, bipolar type (HCC)  Active Problems:    GERD (gastroesophageal reflux disease)    Medical clearance for psychiatric admission    Tobacco abuse    T wave inversion in EKG    Chronic idiopathic constipation    Vitamin B 12 deficiency    Vitamin D deficiency      Recommended Treatment:     Planned medication and treatment changes:    All current active medications have been reviewed  Encourage group therapy, milieu therapy and occupational therapy  Behavioral Health checks every 7 minutes    Decrease Zyprexa to 10 mg twice daily  Increase loxapine to 20 mg twice daily  Continue all other medications  Discharge planning ongoing    Current Facility-Administered Medications   Medication Dose Route Frequency Provider Last Rate    acetaminophen  650 mg Oral Q6H PRN  Yasmin Harvey MD      acetaminophen  650 mg Oral Q4H PRN Yasmin Harvey MD      acetaminophen  975 mg Oral Q6H PRN Yasmin Harvey MD      aluminum-magnesium hydroxide-simethicone  30 mL Oral Q4H PRN Yasmin Harvey MD      haloperidol lactate  2.5 mg Intramuscular Q4H PRN Max 4/day Yasmin Harvey MD      And    LORazepam  1 mg Intramuscular Q4H PRN Max 4/day Yasmin Harvey MD      And    benztropine  0.5 mg Intramuscular Q4H PRN Max 4/day Yasmin Harvey MD      haloperidol lactate  5 mg Intramuscular Q4H PRN Max 4/day Yasmin Harvey MD      And    LORazepam  2 mg Intramuscular Q4H PRN Max 4/day Yasmin Harvey MD      And    benztropine  1 mg Intramuscular Q4H PRN Max 4/day Yasmin Harvey MD      benztropine  0.5 mg Oral BID La Flores MD      benztropine  1 mg Oral Q4H PRN Max 6/day Yasmin Harvey MD      bisacodyl  10 mg Rectal Daily PRN Yasmin Harvey MD      cholecalciferol  2,000 Units Oral Daily HOLLI Monge      cyanocobalamin  1,000 mcg Oral Daily HOLLI Monge      hydrOXYzine HCL  50 mg Oral Q6H PRN Max 4/day Yasmin Harvey MD      Or    diphenhydrAMINE  50 mg Intramuscular Q6H PRN Yasmin Harvey MD      escitalopram  20 mg Oral Daily La Flores MD      haloperidol  1 mg Oral Q6H PRN Yasmin Harvey MD      haloperidol  2.5 mg Oral Q4H PRN Max 4/day Yasmin Harvey MD      haloperidol  5 mg Oral Q4H PRN Max 4/day Yasmin Harvey MD      hydrocortisone   Topical 4x Daily PRN Nisreen Khalil MD      hydrOXYzine HCL  100 mg Oral Q6H PRN Max 4/day Yasmin Harvey MD      Or    LORazepam  2 mg Intramuscular Q6H PRN Yasmin Harvey MD      hydrOXYzine HCL  25 mg Oral Q6H PRN Max 4/day Yasmin Harvey MD      lithium carbonate  1,200 mg Oral HS HOLLI Anderson      lithium carbonate  300 mg Oral Daily HOLLI Anderson      loxapine  10 mg Oral Daily Verónica Jo PA-C      loxapine  20 mg Oral  HS Verónica Jo PA-C      melatonin  3 mg Oral HS Yasmin Harvey MD      mirtazapine  45 mg Oral HS La Flores MD      nicotine polacrilex  4 mg Oral Q2H PRJOHN Harvey MD      OLANZapine  10 mg Oral Daily HOLLI Anderson      OLANZapine  15 mg Oral HS Verónica Jo PA-C      polyethylene glycol  17 g Oral Daily PRJOHN Harvey MD      polyethylene glycol  17 g Oral Daily HOLLI Monge      senna-docusate sodium  1 tablet Oral Daily PRJOHN Harvey MD      traZODone  50 mg Oral HS PRJOHN Harvey MD       Risks / Benefits of Treatment:    Risks, benefits, and possible side effects of medications explained to patient and patient verbalizes understanding and agreement for treatment.    Counseling / Coordination of Care:    Total floor / unit time spent today 20 minutes. Greater than 50% of total time was spent with the patient and / or family counseling and / or coordination of care. A description of counseling / coordination of care:  Patient's progress discussed with staff in treatment team meeting.  Medications, treatment progress and treatment plan reviewed with patient.    Verónica Jo PA-C 12/29/23

## 2023-12-30 PROCEDURE — 99232 SBSQ HOSP IP/OBS MODERATE 35: CPT | Performed by: STUDENT IN AN ORGANIZED HEALTH CARE EDUCATION/TRAINING PROGRAM

## 2023-12-30 RX ORDER — DIPHENHYDRAMINE HYDROCHLORIDE, ZINC ACETATE 2; .1 G/100G; G/100G
CREAM TOPICAL 2 TIMES DAILY PRN
Status: DISCONTINUED | OUTPATIENT
Start: 2023-12-30 | End: 2024-03-29 | Stop reason: HOSPADM

## 2023-12-30 RX ORDER — OLANZAPINE 5 MG/1
5 TABLET ORAL DAILY
Status: DISCONTINUED | OUTPATIENT
Start: 2023-12-31 | End: 2024-01-02

## 2023-12-30 RX ADMIN — NICOTINE POLACRILEX 4 MG: 4 GUM, CHEWING ORAL at 09:04

## 2023-12-30 RX ADMIN — MIRTAZAPINE 45 MG: 30 TABLET, FILM COATED ORAL at 21:25

## 2023-12-30 RX ADMIN — ESCITALOPRAM OXALATE 20 MG: 10 TABLET ORAL at 09:04

## 2023-12-30 RX ADMIN — BENZTROPINE MESYLATE 0.5 MG: 0.5 TABLET ORAL at 09:04

## 2023-12-30 RX ADMIN — NICOTINE POLACRILEX 4 MG: 4 GUM, CHEWING ORAL at 18:02

## 2023-12-30 RX ADMIN — LITHIUM CARBONATE 1200 MG: 450 TABLET, EXTENDED RELEASE ORAL at 21:25

## 2023-12-30 RX ADMIN — LOXAPINE 30 MG: 25 CAPSULE ORAL at 21:25

## 2023-12-30 RX ADMIN — POLYETHYLENE GLYCOL 3350 17 G: 17 POWDER, FOR SOLUTION ORAL at 09:04

## 2023-12-30 RX ADMIN — LITHIUM CARBONATE 300 MG: 300 TABLET, EXTENDED RELEASE ORAL at 09:04

## 2023-12-30 RX ADMIN — CHOLECALCIFEROL TAB 25 MCG (1000 UNIT) 2000 UNITS: 25 TAB at 09:04

## 2023-12-30 RX ADMIN — OLANZAPINE 10 MG: 10 TABLET, FILM COATED ORAL at 21:25

## 2023-12-30 RX ADMIN — MELATONIN TAB 3 MG 3 MG: 3 TAB at 21:25

## 2023-12-30 RX ADMIN — CYANOCOBALAMIN TAB 1000 MCG 1000 MCG: 1000 TAB at 09:04

## 2023-12-30 RX ADMIN — OLANZAPINE 10 MG: 10 TABLET, FILM COATED ORAL at 09:04

## 2023-12-30 RX ADMIN — LOXAPINE 20 MG: 10 CAPSULE ORAL at 09:04

## 2023-12-30 RX ADMIN — BENZTROPINE MESYLATE 0.5 MG: 0.5 TABLET ORAL at 18:01

## 2023-12-30 NOTE — PROGRESS NOTES
"Progress Note - Behavioral Health     Alberto Berumen 28 y.o. male MRN: [id number]   Unit/Bed#: [facility] 383-02 Encounter: [id number]    Behavior over the last 24 hours: unchanged.     Alberto today continues to endorse AH with voices that state they are going to harm him and his family. Patient is aware of his cross-taper from Zyprexa to loxapine and said he \"hopes\" the new medication will control his voices better. He otherwise denies VH, SI, HI. He denies any intent to act on the voices, or fear that he will act on them. He was seen sitting with peers calmly and writing in the day room. He noted a rash today he has had for about three days, was behind his ears (appears to be somewhat scaly in appearance/skin buildup) and mild rash on upper back, pt states he forgot to tell Dr. Flores. He has some mild itching and notes he does not need anything for it, however have ordered Benadryl cream for now. Note pt has had poor hygiene per chart review. No other symptoms of note, no respiratory symptoms. Will continue to assess him for any worsening and will make medicine aware, will assess again tomorrow.    Sleep: normal  Appetite: normal  Medication side effects: No   ROS: all other systems are negative    Mental Status Evaluation:    Appearance:  age appropriate, casually dressed   Behavior:  cooperative, calm   Speech:  normal rate, normal volume   Mood:  \"Okay\"   Affect:  blunted   Thought Process:  goal directed   Associations: concrete associations   Thought Content:  no overt delusions   Perceptual Disturbances: auditory hallucinations   Risk Potential: Suicidal ideation - None at present  Homicidal ideation - None at present  Potential for aggression - No   Sensorium:  oriented to person, place, and time/date   Memory:  recent and remote memory grossly intact   Consciousness:  alert and awake   Attention/Concentration: attention span and concentration are age appropriate   Insight:  limited   Judgment: limited "   Gait/Station: normal gait/station   Motor Activity: no abnormal movements     Vital signs in last 24 hours:    Temp:  [97.5 °F (36.4 °C)-98.1 °F (36.7 °C)] 98.1 °F (36.7 °C)  HR:  [] 103  Resp:  [18] 18  BP: (134-146)/(90-93) 146/93    Laboratory results: I have personally reviewed all pertinent laboratory/tests results    Labs in last 72 hours: No new labs.    Progress Toward Goals: unchanged    Assessment/Plan   Principal Problem:    Schizoaffective disorder, bipolar type (HCC)  Active Problems:    GERD (gastroesophageal reflux disease)    Medical clearance for psychiatric admission    Tobacco abuse    T wave inversion in EKG    Chronic idiopathic constipation    Vitamin B 12 deficiency    Vitamin D deficiency      Recommended Treatment:     Planned medication and treatment changes:    All current active medications have been reviewed  Encourage group therapy, milieu therapy and occupational therapy  - Continue titration from Zyprexa to loxapine:  - Changed Zyprexa 10 BID to 5 mg qam and 10 mg qhs for psychosis cross-titration  - Changed loxapine from 20 mg BID to 20 mg qam and 30 mg qhs for psychosis    Current Facility-Administered Medications   Medication Dose Route Frequency Provider Last Rate    acetaminophen  650 mg Oral Q6H PRN Yasmin Harvey MD      acetaminophen  650 mg Oral Q4H PRN Yasmin Harvey MD      acetaminophen  975 mg Oral Q6H PRN Yasmin Harvey MD      aluminum-magnesium hydroxide-simethicone  30 mL Oral Q4H PRN Yasmin Harvey MD      haloperidol lactate  2.5 mg Intramuscular Q4H PRN Max 4/day Yasmin Harvey MD      And    LORazepam  1 mg Intramuscular Q4H PRN Max 4/day Yasmin Harvey MD      And    benztropine  0.5 mg Intramuscular Q4H PRN Max 4/day Yasmin Harvey MD      haloperidol lactate  5 mg Intramuscular Q4H PRN Max 4/day Yasmin Harvey MD      And    LORazepam  2 mg Intramuscular Q4H PRN Max 4/day Yasmin Harvey MD      And    benztropine  1 mg  Intramuscular Q4H PRN Max 4/day Yasmin Harvey MD      benztropine  0.5 mg Oral BID La Flores MD      benztropine  1 mg Oral Q4H PRN Max 6/day Yasmin Harvey MD      bisacodyl  10 mg Rectal Daily PRN Yasmin Harvey MD      cholecalciferol  2,000 Units Oral Daily HOLLI Monge      cyanocobalamin  1,000 mcg Oral Daily HOLLI Monge      hydrOXYzine HCL  50 mg Oral Q6H PRN Max 4/day Yasmin Harvey MD      Or    diphenhydrAMINE  50 mg Intramuscular Q6H PRN Yasmin Harvey MD      diphenhydrAMINE-zinc acetate   Topical BID PRN Damari Larose MD      escitalopram  20 mg Oral Daily La Flores MD      haloperidol  1 mg Oral Q6H PRN Yasmin Harvey MD      haloperidol  2.5 mg Oral Q4H PRN Max 4/day Yasmin Harvey MD      haloperidol  5 mg Oral Q4H PRN Max 4/day Yasmin Harvey MD      hydrocortisone   Topical 4x Daily PRN Nisreen Khalil MD      hydrOXYzine HCL  100 mg Oral Q6H PRN Max 4/day Yasmin Harvey MD      Or    LORazepam  2 mg Intramuscular Q6H PRN Yasmin Harvey MD      hydrOXYzine HCL  25 mg Oral Q6H PRN Max 4/day Yasmin Harvey MD      lithium carbonate  1,200 mg Oral HS HOLLI Anderson      lithium carbonate  300 mg Oral Daily HOLLI Anderson      loxapine  20 mg Oral HS Verónica Jo, PA-C      loxapine  20 mg Oral Daily Verónica Jo, PASORAYA      melatonin  3 mg Oral HS Yasmin Harvey MD      mirtazapine  45 mg Oral HS La Flores MD      nicotine polacrilex  4 mg Oral Q2H PRN Yasmin Harvey MD      OLANZapine  10 mg Oral Daily HOLLI Anderson      OLANZapine  10 mg Oral HS Verónica Jo, PA-C      polyethylene glycol  17 g Oral Daily PRN Yasmin Harvey MD      polyethylene glycol  17 g Oral Daily HOLLI Monge      senna-docusate sodium  1 tablet Oral Daily PRN Yasmin Harvey MD      traZODone  50 mg Oral HS PRN Yasmin Harvey MD       Risks / Benefits of  Treatment:    Risks, benefits, and possible side effects of medications explained to patient and patient verbalizes understanding and agreement for treatment.    Counseling / Coordination of Care:    Medications, treatment progress and treatment plan reviewed with patient.    Damari Larose MD 12/30/23

## 2023-12-30 NOTE — NURSING NOTE
"Pt is calm and pleasant with a blunted affect and mood that is \"not good, not bad\" and reporting AH saying, \"I'm going to hell and me and my family are gonna die.\" Reports mild depression, and denies anxiety, SI/HI.   "

## 2023-12-31 LAB
BASOPHILS # BLD AUTO: 0.07 THOUSANDS/ÂΜL (ref 0–0.1)
BASOPHILS NFR BLD AUTO: 1 % (ref 0–1)
EOSINOPHIL # BLD AUTO: 0.36 THOUSAND/ÂΜL (ref 0–0.61)
EOSINOPHIL NFR BLD AUTO: 3 % (ref 0–6)
ERYTHROCYTE [DISTWIDTH] IN BLOOD BY AUTOMATED COUNT: 14.1 % (ref 11.6–15.1)
HCT VFR BLD AUTO: 41.9 % (ref 36.5–49.3)
HGB BLD-MCNC: 12.6 G/DL (ref 12–17)
IMM GRANULOCYTES # BLD AUTO: 0.07 THOUSAND/UL (ref 0–0.2)
IMM GRANULOCYTES NFR BLD AUTO: 1 % (ref 0–2)
LYMPHOCYTES # BLD AUTO: 2.05 THOUSANDS/ÂΜL (ref 0.6–4.47)
LYMPHOCYTES NFR BLD AUTO: 18 % (ref 14–44)
MCH RBC QN AUTO: 24.4 PG (ref 26.8–34.3)
MCHC RBC AUTO-ENTMCNC: 30.1 G/DL (ref 31.4–37.4)
MCV RBC AUTO: 81 FL (ref 82–98)
MONOCYTES # BLD AUTO: 1.09 THOUSAND/ÂΜL (ref 0.17–1.22)
MONOCYTES NFR BLD AUTO: 10 % (ref 4–12)
NEUTROPHILS # BLD AUTO: 7.75 THOUSANDS/ÂΜL (ref 1.85–7.62)
NEUTS SEG NFR BLD AUTO: 67 % (ref 43–75)
NRBC BLD AUTO-RTO: 0 /100 WBCS
PLATELET # BLD AUTO: 266 THOUSANDS/UL (ref 149–390)
PMV BLD AUTO: 9.9 FL (ref 8.9–12.7)
RBC # BLD AUTO: 5.17 MILLION/UL (ref 3.88–5.62)
WBC # BLD AUTO: 11.39 THOUSAND/UL (ref 4.31–10.16)

## 2023-12-31 PROCEDURE — 85025 COMPLETE CBC W/AUTO DIFF WBC: CPT | Performed by: INTERNAL MEDICINE

## 2023-12-31 PROCEDURE — 99232 SBSQ HOSP IP/OBS MODERATE 35: CPT | Performed by: STUDENT IN AN ORGANIZED HEALTH CARE EDUCATION/TRAINING PROGRAM

## 2023-12-31 RX ORDER — BENZOCAINE/MENTHOL 6 MG-10 MG
LOZENGE MUCOUS MEMBRANE 2 TIMES DAILY
Status: DISCONTINUED | OUTPATIENT
Start: 2023-12-31 | End: 2023-12-31

## 2023-12-31 RX ORDER — BENZOCAINE/MENTHOL 6 MG-10 MG
LOZENGE MUCOUS MEMBRANE 2 TIMES DAILY
Status: DISPENSED | OUTPATIENT
Start: 2023-12-31 | End: 2024-01-07

## 2023-12-31 RX ORDER — LANOLIN ALCOHOL/MO/W.PET/CERES
CREAM (GRAM) TOPICAL 3 TIMES DAILY PRN
Status: DISCONTINUED | OUTPATIENT
Start: 2023-12-31 | End: 2024-03-29 | Stop reason: HOSPADM

## 2023-12-31 RX ADMIN — LOXAPINE 20 MG: 10 CAPSULE ORAL at 08:42

## 2023-12-31 RX ADMIN — OLANZAPINE 5 MG: 5 TABLET, FILM COATED ORAL at 08:42

## 2023-12-31 RX ADMIN — HYDROCORTISONE: 1 CREAM TOPICAL at 17:22

## 2023-12-31 RX ADMIN — POLYETHYLENE GLYCOL 3350 17 G: 17 POWDER, FOR SOLUTION ORAL at 08:41

## 2023-12-31 RX ADMIN — DIPHENHYDRAMINE HYDROCHLORIDE, ZINC ACETATE: 2; .1 CREAM TOPICAL at 12:09

## 2023-12-31 RX ADMIN — LITHIUM CARBONATE 1200 MG: 450 TABLET, EXTENDED RELEASE ORAL at 21:15

## 2023-12-31 RX ADMIN — OLANZAPINE 10 MG: 10 TABLET, FILM COATED ORAL at 21:15

## 2023-12-31 RX ADMIN — NICOTINE POLACRILEX 4 MG: 4 GUM, CHEWING ORAL at 13:10

## 2023-12-31 RX ADMIN — MIRTAZAPINE 45 MG: 30 TABLET, FILM COATED ORAL at 21:15

## 2023-12-31 RX ADMIN — ESCITALOPRAM OXALATE 20 MG: 10 TABLET ORAL at 08:42

## 2023-12-31 RX ADMIN — MELATONIN TAB 3 MG 3 MG: 3 TAB at 21:15

## 2023-12-31 RX ADMIN — BENZTROPINE MESYLATE 0.5 MG: 0.5 TABLET ORAL at 08:42

## 2023-12-31 RX ADMIN — CHOLECALCIFEROL TAB 25 MCG (1000 UNIT) 2000 UNITS: 25 TAB at 08:42

## 2023-12-31 RX ADMIN — NICOTINE POLACRILEX 4 MG: 4 GUM, CHEWING ORAL at 17:56

## 2023-12-31 RX ADMIN — CYANOCOBALAMIN TAB 1000 MCG 1000 MCG: 1000 TAB at 08:42

## 2023-12-31 RX ADMIN — Medication: at 13:54

## 2023-12-31 RX ADMIN — HYDROCORTISONE: 1 CREAM TOPICAL at 12:09

## 2023-12-31 RX ADMIN — NICOTINE POLACRILEX 4 MG: 4 GUM, CHEWING ORAL at 08:42

## 2023-12-31 RX ADMIN — LOXAPINE 30 MG: 25 CAPSULE ORAL at 21:15

## 2023-12-31 RX ADMIN — LITHIUM CARBONATE 300 MG: 300 TABLET, EXTENDED RELEASE ORAL at 08:42

## 2023-12-31 RX ADMIN — BENZTROPINE MESYLATE 0.5 MG: 0.5 TABLET ORAL at 17:22

## 2023-12-31 NOTE — NURSING NOTE
"Pt is pleasant, cooperative, and med compliant. Pt has a blunted affect. Pt can be seen in the pt lounge socializing peers. Pt denies SI/HI and VH at this time. Pt reports AH, stating \"they are terrible\" but states he is trying to cope. Pt denies depression and anxiety at this time. No unmet needs reported. Will continue to monitor.  "

## 2023-12-31 NOTE — PROGRESS NOTES
"Progress Note - Behavioral Health     Alberto Berumen 28 y.o. male MRN: [id number]   Unit/Bed#: [facility] 383-02 Encounter: [id number]    Behavior over the last 24 hours: unchanged.     Alberto today discussed he continues to hear voices today that say they will \"kill\" himself and his family. He denies AH is command in nature. He denies any improvement in voices today. He denies urges to comply with voices and feels he can keep himself safe. Discussed with him continuing cross-titration, he is agreeable to any dose changes per cross-titration. No stiffness or tremor on EPS examination. He denies SI, HI, VH.     Note per nurse report, pt has been seen around unit talking on phone, laughing, does not appear too internally preoccupied overall.    Pt seen yesterday for rash, given hydrocortisone cream for behind ears. Discussed pt rash with medicine Dr. Sagastume today, appreciate recommendations. Appears to me to be papular and not morbiliform in nature as with drug reactions. Dr. Sagastume noted pt has a maculopapular rash in setting background acanthosis nigricans, xerosis/dry skin with pruritus, no oral lesions, per his note low suspicion for drug reaction. Will continue with hydrocortisone cream, topical benadryl for pruritus. Will f/u CBC with diff to rule-out agranulocytosis.    Addendum: CBC resulted, no neutropenia, no concern agranulocytosis. (Neutrophilia however with very slightly elevated white count, could indicate inflammation).    Sleep: normal  Appetite: normal  Medication side effects: No   ROS: all other systems are negative    Mental Status Evaluation:    Appearance:  Casually dressed   Behavior:  cooperative, calm   Speech:  normal rate, low volume   Mood:  \"Okay\"   Affect:  blunted   Thought Process:  goal directed   Associations: concrete associations   Thought Content:  no overt delusions   Perceptual Disturbances: Auditory hallucinations   Risk Potential: Suicidal ideation - None at present  Homicidal " ideation - None at present  Potential for aggression - No   Sensorium:  oriented to person, place, and time/date   Memory:  recent and remote memory grossly intact   Consciousness:  alert and awake   Attention/Concentration: decreased concentration and decreased attention span   Insight:  limited   Judgment: limited   Gait/Station: normal gait/station   Motor Activity: no abnormal movements     Vital signs in last 24 hours:    Temp:  [97.3 °F (36.3 °C)-98.1 °F (36.7 °C)] 97.3 °F (36.3 °C)  HR:  [] 98  Resp:  [18] 18  BP: (146-152)/(86-93) 152/86    Laboratory results: I have personally reviewed all pertinent laboratory/tests results    Labs in last 72 hours: No new results.    Progress Toward Goals: He continues with  at same intensity    Assessment/Plan   Principal Problem:    Schizoaffective disorder, bipolar type (HCC)  Active Problems:    GERD (gastroesophageal reflux disease)    Medical clearance for psychiatric admission    Tobacco abuse    T wave inversion in EKG    Chronic idiopathic constipation    Vitamin B 12 deficiency    Vitamin D deficiency      Recommended Treatment:     Planned medication and treatment changes:    All current active medications have been reviewed  Encourage group therapy, milieu therapy and occupational therapy  All current active medications have been reviewed  Encourage group therapy, milieu therapy and occupational therapy  - Continue titration from Zyprexa to loxapine:  - Will change from Zyprexa 5 mg qam and 10 mg qhs to 5 mg BID   - Will change from loxapine 20 mg qam and 30 mg qhs to 30 mg BID for psychosis   - Will f/u CBC with diff results in setting rash, will f/u ANC, however low suspicion for drug-related rash, appreciate medicine recommendations.  Addendum: CBC resulted, no neutropenia, no concern agranulocytosis. (Neutrophilia however with very slightly elevated white count, could indicate inflammation).  - Continue with rash creams for now, hydrocortisone cream  for rash and benadryl cream PRN for itching    Current Facility-Administered Medications   Medication Dose Route Frequency Provider Last Rate    acetaminophen  650 mg Oral Q6H PRN Yasmin Harvey MD      acetaminophen  650 mg Oral Q4H PRN Yasmin Harvey MD      acetaminophen  975 mg Oral Q6H PRN Yasmin Harvey MD      aluminum-magnesium hydroxide-simethicone  30 mL Oral Q4H PRN Yasmin Harvey MD      haloperidol lactate  2.5 mg Intramuscular Q4H PRN Max 4/day Yasmin Harvey MD      And    LORazepam  1 mg Intramuscular Q4H PRN Max 4/day Yasmin Harvey MD      And    benztropine  0.5 mg Intramuscular Q4H PRN Max 4/day Yasmin Harvey MD      haloperidol lactate  5 mg Intramuscular Q4H PRN Max 4/day Yasmin Harvey MD      And    LORazepam  2 mg Intramuscular Q4H PRN Max 4/day Yasmin Harvey MD      And    benztropine  1 mg Intramuscular Q4H PRN Max 4/day Yasmin Harvey MD      benztropine  0.5 mg Oral BID La Flores MD      benztropine  1 mg Oral Q4H PRN Max 6/day Yasmin Harvey MD      bisacodyl  10 mg Rectal Daily PRN Yasmin Harvey MD      cholecalciferol  2,000 Units Oral Daily HOLLI Monge      cyanocobalamin  1,000 mcg Oral Daily HOLLI Monge      hydrOXYzine HCL  50 mg Oral Q6H PRN Max 4/day Yasmin Harvey MD      Or    diphenhydrAMINE  50 mg Intramuscular Q6H PRN Yasmin Harvey MD      diphenhydrAMINE-zinc acetate   Topical BID PRN Anjel Sagastume MD      escitalopram  20 mg Oral Daily La Flores MD      haloperidol  1 mg Oral Q6H PRN Yasmin Harvey MD      haloperidol  2.5 mg Oral Q4H PRN Max 4/day Yasmin Harvey MD      haloperidol  5 mg Oral Q4H PRN Max 4/day Yasmin Harvey MD      hydrocortisone   Topical BID Anjel Sagastume MD      hydrOXYzine HCL  100 mg Oral Q6H PRN Max 4/day Yasmin Harvey MD      Or    LORazepam  2 mg Intramuscular Q6H PRN Yasmin Harvey MD      hydrOXYzine HCL  25 mg Oral Q6H PRN Max  4/day Yasmin Harvey MD      lithium carbonate  1,200 mg Oral HS HOLLI Anderson      lithium carbonate  300 mg Oral Daily HOLLI Anderson      loxapine  20 mg Oral Daily Verónica Jo, PA-AALIYAH      loxapine  30 mg Oral HS Damari Larose MD      melatonin  3 mg Oral HS Yasmin Harvey MD      mirtazapine  45 mg Oral HS La Flores MD      nicotine polacrilex  4 mg Oral Q2H PRN Yasmin Harvey MD      OLANZapine  10 mg Oral HS Verónica Jo, PA-C      OLANZapine  5 mg Oral Daily Damari Larose MD      polyethylene glycol  17 g Oral Daily PRN Yasmin Harvey MD      polyethylene glycol  17 g Oral Daily HOLLI Monge      senna-docusate sodium  1 tablet Oral Daily PRN Yasmin Harvey MD      traZODone  50 mg Oral HS PRN Yasmin Harvey MD      white petrolatum-mineral oil   Topical TID PRN Anjel Sagastume MD       Risks / Benefits of Treatment:    Risks, benefits, and possible side effects of medications explained to patient and patient verbalizes understanding and agreement for treatment.    Counseling / Coordination of Care:    Medications, treatment progress and treatment plan reviewed with patient.    Damari Larose MD 12/31/23

## 2023-12-31 NOTE — NURSING NOTE
Pt is calm with a constricted affect, brightening at times when socializing with peers. Pt visible in dayroom throughout the morning watching tv. Reports mild depression, denies SI/HI/VH, or anxiety. Says voices are telling him he is going to die and that he and his family are going to hell. Pt does not appear internally preoccupied. Nicotine gum given for cravings and effective.

## 2023-12-31 NOTE — NURSING NOTE
Given PRN benadryl at 1209 for itching on upper back, neck, and ears. Intervention effective upon reassessment at 1309. Given eucerin cream at 1354 for dry skin, intervention effective. Pt now having skin colored papules around antecubital area bilaterally. Medical provider notified, no new orders.  Pt visible on the milieu socializing with peers smiling and laughing with good attention and concentration during conversation.

## 2023-12-31 NOTE — QUICK NOTE
Evaluated rash at bedside.  Patient has midl rash behind L ear and nape of neck.  Difficult to discern if maculopapular in the setting of background acanthosis nigricans.  There is some associated xerosis and patient reports it being pruritic.  No oral lesions.  Patient is on loxapine D#4.  Low suspicion for drug reaction.  Would check CBC r/o developing agranulocytosis, though low suspicion for drug reaction.      A/P:    # Rash, non-specific  Schedule hydrocortisone cream 1% BID x7 days apply to affected areas  Can use topical benadryl/zinc prn for pruritus  Check CBC with diff

## 2024-01-01 PROCEDURE — 99232 SBSQ HOSP IP/OBS MODERATE 35: CPT | Performed by: PSYCHIATRY & NEUROLOGY

## 2024-01-01 RX ORDER — OLANZAPINE 5 MG/1
5 TABLET ORAL
Status: COMPLETED | OUTPATIENT
Start: 2024-01-01 | End: 2024-01-03

## 2024-01-01 RX ADMIN — LOXAPINE 20 MG: 10 CAPSULE ORAL at 08:16

## 2024-01-01 RX ADMIN — HYDROCORTISONE: 1 CREAM TOPICAL at 08:17

## 2024-01-01 RX ADMIN — NICOTINE POLACRILEX 4 MG: 4 GUM, CHEWING ORAL at 09:16

## 2024-01-01 RX ADMIN — ESCITALOPRAM OXALATE 20 MG: 10 TABLET ORAL at 08:16

## 2024-01-01 RX ADMIN — MIRTAZAPINE 45 MG: 30 TABLET, FILM COATED ORAL at 21:43

## 2024-01-01 RX ADMIN — LITHIUM CARBONATE 1200 MG: 450 TABLET, EXTENDED RELEASE ORAL at 21:43

## 2024-01-01 RX ADMIN — CHOLECALCIFEROL TAB 25 MCG (1000 UNIT) 2000 UNITS: 25 TAB at 08:16

## 2024-01-01 RX ADMIN — LITHIUM CARBONATE 300 MG: 300 TABLET, EXTENDED RELEASE ORAL at 08:16

## 2024-01-01 RX ADMIN — NICOTINE POLACRILEX 4 MG: 4 GUM, CHEWING ORAL at 12:53

## 2024-01-01 RX ADMIN — POLYETHYLENE GLYCOL 3350 17 G: 17 POWDER, FOR SOLUTION ORAL at 08:17

## 2024-01-01 RX ADMIN — NICOTINE POLACRILEX 4 MG: 4 GUM, CHEWING ORAL at 18:06

## 2024-01-01 RX ADMIN — OLANZAPINE 5 MG: 5 TABLET, FILM COATED ORAL at 21:43

## 2024-01-01 RX ADMIN — HYDROCORTISONE: 1 CREAM TOPICAL at 17:30

## 2024-01-01 RX ADMIN — OLANZAPINE 5 MG: 5 TABLET, FILM COATED ORAL at 08:16

## 2024-01-01 RX ADMIN — LOXAPINE 30 MG: 25 CAPSULE ORAL at 21:42

## 2024-01-01 RX ADMIN — BENZTROPINE MESYLATE 0.5 MG: 0.5 TABLET ORAL at 08:16

## 2024-01-01 RX ADMIN — NICOTINE POLACRILEX 4 MG: 4 GUM, CHEWING ORAL at 15:47

## 2024-01-01 RX ADMIN — TRAZODONE HYDROCHLORIDE 50 MG: 50 TABLET ORAL at 21:45

## 2024-01-01 RX ADMIN — CYANOCOBALAMIN TAB 1000 MCG 1000 MCG: 1000 TAB at 08:16

## 2024-01-01 RX ADMIN — MELATONIN TAB 3 MG 3 MG: 3 TAB at 21:43

## 2024-01-01 RX ADMIN — BENZTROPINE MESYLATE 0.5 MG: 0.5 TABLET ORAL at 17:30

## 2024-01-01 NOTE — NURSING NOTE
Pt denies SI, HI, and VH. Pt continues to report AH of voices saying they want to harm him and his family. Pt has no current complaints or concerns. Pt is visible in the milieu and social with select peers. Medication and meal complaint.

## 2024-01-01 NOTE — NURSING NOTE
Patient visible on the unit and in the dayroom. Patient calm, cooperative and pleasant with staff and peers. Complaint with HS medication and evening routine. Denies SI/HI/VH/AH; at this time. Denies any unmet needs. Will continue to monitor.

## 2024-01-01 NOTE — PROGRESS NOTES
"Progress Note - Behavioral Health     Alberto Berumen 28 y.o. male MRN: [id number]   Unit/Bed#: [facility] 383-02 Encounter: [id number]    Behavior over the last 24 hours: unchanged.     Alberto continues to report auditory hallucinations of \"voices saying that I am going to hell and that they are going to kill me and my family\". He still seems distressed and preoccupied, still has blunted affect and poor eye contact. Limited participation in milieu. Has been taking medications.    Sleep: normal  Appetite: normal  Medication side effects: No   ROS: no complaints, denies any shortness of breath, chest pain, or abdominal pain, all other systems are negative    Mental Status Evaluation:    Appearance:  casually dressed   Behavior:  guarded, poor eye contact, psychomotor retardation   Speech:  scant, soft   Mood:  dysphoric   Affect:  blunted   Thought Process:  concrete   Associations: concrete associations   Thought Content:  paranoid ideation   Perceptual Disturbances: auditory hallucinations of \"voices saying that I am going to hell and that they are going to kill me and my family \", no visual hallucinations   Risk Potential: Suicidal ideation - None at present  Homicidal ideation - None  Potential for aggression - No   Sensorium:  oriented to person, place, and time/date   Memory:  recent and remote memory grossly intact   Consciousness:  alert and awake   Attention/Concentration: decreased concentration and decreased attention span   Insight:  impaired   Judgment: impaired   Gait/Station: normal gait/station, normal balance   Motor Activity: no abnormal movements     Vital signs in last 24 hours:    Temp:  [97.7 °F (36.5 °C)-98.1 °F (36.7 °C)] 97.7 °F (36.5 °C)  HR:  [] 99  Resp:  [18] 18  BP: (141-154)/(86-89) 141/86    Laboratory results: I have personally reviewed all pertinent laboratory/tests results    CBC:   Lab Results   Component Value Date    WBC 11.39 (H) 12/31/2023    RBC 5.17 12/31/2023    HGB 12.6 " 12/31/2023    HCT 41.9 12/31/2023    MCV 81 (L) 12/31/2023     12/31/2023    MCH 24.4 (L) 12/31/2023    MCHC 30.1 (L) 12/31/2023    RDW 14.1 12/31/2023    MPV 9.9 12/31/2023    NEUTROABS 7.75 (H) 12/31/2023       Suicide/Homicide Risk Assessment:    Risk of Harm to Self:   Nursing Suicide Risk Assessment Last 24 hours: C-SSRS Risk (Since Last Contact)  Calculated C-SSRS Risk Score (Since Last Contact): No Risk Indicated  Current Specific Risk Factors include: mental illness diagnosis, current psychotic symptoms  Protective Factors: no current suicidal ideation, ability to communicate with staff on the unit, taking medications as ordered on the unit  Based on today's assessment, Alberto presents the following risk of harm to self: low    Risk of Harm to Others:  Nursing Homicide Risk Assessment: Violence Risk to Others: Denies within past 6 months  Based on today's assessment, Alberto presents the following risk of harm to others: low    The following interventions are recommended: behavioral checks every 7 minutes, continued hospitalization on locked unit    Progress Toward Goals: no significant progress, denies current suicidal thoughts, still has psychotic symptoms    Assessment/Plan   Principal Problem:    Schizoaffective disorder, bipolar type (HCC)  Active Problems:    GERD (gastroesophageal reflux disease)    Medical clearance for psychiatric admission    Tobacco abuse    T wave inversion in EKG    Chronic idiopathic constipation    Vitamin B 12 deficiency    Vitamin D deficiency      Recommended Treatment:     Planned medication and treatment changes:    All current active medications have been reviewed  Encourage group therapy, milieu therapy and occupational therapy  Behavioral Health checks every 7 minutes  Increase Loxapine to 30 mg bid to help with psychotic symptoms  Decrease Zyprexa to 5 mg bid and continue gradual taper  Await insurance benefit transfer to [place] to initiate treatment  with Clozaril    Continue all other medications:    Current Facility-Administered Medications   Medication Dose Route Frequency Provider Last Rate    acetaminophen  650 mg Oral Q6H PRN Yasmin Harvey MD      acetaminophen  650 mg Oral Q4H PRN Yasmin Harvey MD      acetaminophen  975 mg Oral Q6H PRN Yasmin Harvey MD      aluminum-magnesium hydroxide-simethicone  30 mL Oral Q4H PRN Yasmin Harvey MD      haloperidol lactate  2.5 mg Intramuscular Q4H PRN Max 4/day Yasmin Harvey MD      And    LORazepam  1 mg Intramuscular Q4H PRN Max 4/day Yasmin Harvey MD      And    benztropine  0.5 mg Intramuscular Q4H PRN Max 4/day Yasmin Harvey MD      haloperidol lactate  5 mg Intramuscular Q4H PRN Max 4/day Yasmin Harvey MD      And    LORazepam  2 mg Intramuscular Q4H PRN Max 4/day Yasmin Harvey MD      And    benztropine  1 mg Intramuscular Q4H PRN Max 4/day Yasmin Harvey MD      benztropine  0.5 mg Oral BID La Flores MD      benztropine  1 mg Oral Q4H PRN Max 6/day Yasmin Harvey MD      bisacodyl  10 mg Rectal Daily PRN Yasmin Harvey MD      cholecalciferol  2,000 Units Oral Daily HOLLI Monge      cyanocobalamin  1,000 mcg Oral Daily HOLLI Monge      hydrOXYzine HCL  50 mg Oral Q6H PRN Max 4/day Yasmin Harvey MD      Or    diphenhydrAMINE  50 mg Intramuscular Q6H PRN Yasmin Harvey MD      diphenhydrAMINE-zinc acetate   Topical BID PRN Anjel Sagastume MD      escitalopram  20 mg Oral Daily La Flores MD      haloperidol  1 mg Oral Q6H PRN Yasmin Harvey MD      haloperidol  2.5 mg Oral Q4H PRN Max 4/day Yasmin Harvey MD      haloperidol  5 mg Oral Q4H PRN Max 4/day Yasmin Harvey MD      hydrocortisone   Topical BID Anjel Sagastume MD      hydrOXYzine HCL  100 mg Oral Q6H PRN Max 4/day Yasmin Harvey MD      Or    LORazepam  2 mg Intramuscular Q6H PRN Yasmin Harvey MD      hydrOXYzine HCL  25 mg Oral Q6H PRN  Max 4/day Yasmin Harvey MD      lithium carbonate  1,200 mg Oral HS HOLLI Anderson      lithium carbonate  300 mg Oral Daily HOLLI Anderson      loxapine  30 mg Oral HS Damari Larose MD      [START ON 1/2/2024] loxapine  30 mg Oral Daily La Flores MD      melatonin  3 mg Oral HS Yasmin Harvey MD      mirtazapine  45 mg Oral HS La Flores MD      nicotine polacrilex  4 mg Oral Q2H PRN Yasmin Harvey MD      OLANZapine  5 mg Oral Daily Damari Larose MD      OLANZapine  5 mg Oral HS La Flores MD      polyethylene glycol  17 g Oral Daily PRN Yasmin Harvey MD      polyethylene glycol  17 g Oral Daily HOLLI Monge      senna-docusate sodium  1 tablet Oral Daily PRN Yasmin Harvey MD      traZODone  50 mg Oral HS PRN Yasmin Harvey MD      white petrolatum-mineral oil   Topical TID PRN Anjel Sagastume MD       Risks / Benefits of Treatment:    Risks, benefits, and possible side effects of medications explained to patient. Patient has limited understanding of risks and benefits of treatment at this time, but agrees to take medications as prescribed.    Counseling / Coordination of Care:    Patient's progress discussed with staff in treatment team meeting.  Medications, treatment progress and treatment plan reviewed with patient.  Medication changes discussed with patient.    La Flores MD 01/01/24

## 2024-01-02 PROCEDURE — 99232 SBSQ HOSP IP/OBS MODERATE 35: CPT | Performed by: PSYCHIATRY & NEUROLOGY

## 2024-01-02 RX ORDER — CYANOCOBALAMIN 1000 UG/ML
1000 INJECTION, SOLUTION INTRAMUSCULAR; SUBCUTANEOUS
Status: DISCONTINUED | OUTPATIENT
Start: 2024-01-02 | End: 2024-01-02

## 2024-01-02 RX ORDER — ERGOCALCIFEROL 1.25 MG/1
50000 CAPSULE ORAL WEEKLY
Status: DISCONTINUED | OUTPATIENT
Start: 2024-01-02 | End: 2024-01-02

## 2024-01-02 RX ADMIN — POLYETHYLENE GLYCOL 3350 17 G: 17 POWDER, FOR SOLUTION ORAL at 08:24

## 2024-01-02 RX ADMIN — MELATONIN TAB 3 MG 3 MG: 3 TAB at 21:36

## 2024-01-02 RX ADMIN — LITHIUM CARBONATE 1200 MG: 450 TABLET, EXTENDED RELEASE ORAL at 21:36

## 2024-01-02 RX ADMIN — LITHIUM CARBONATE 300 MG: 300 TABLET, EXTENDED RELEASE ORAL at 08:26

## 2024-01-02 RX ADMIN — LOXAPINE 30 MG: 25 CAPSULE ORAL at 08:25

## 2024-01-02 RX ADMIN — OLANZAPINE 5 MG: 5 TABLET, FILM COATED ORAL at 08:26

## 2024-01-02 RX ADMIN — LOXAPINE 30 MG: 25 CAPSULE ORAL at 21:35

## 2024-01-02 RX ADMIN — HYDROCORTISONE: 1 CREAM TOPICAL at 17:21

## 2024-01-02 RX ADMIN — CYANOCOBALAMIN TAB 1000 MCG 1000 MCG: 1000 TAB at 08:25

## 2024-01-02 RX ADMIN — CHOLECALCIFEROL TAB 25 MCG (1000 UNIT) 2000 UNITS: 25 TAB at 08:26

## 2024-01-02 RX ADMIN — BENZTROPINE MESYLATE 0.5 MG: 0.5 TABLET ORAL at 17:21

## 2024-01-02 RX ADMIN — HYDROCORTISONE: 1 CREAM TOPICAL at 08:24

## 2024-01-02 RX ADMIN — MIRTAZAPINE 45 MG: 30 TABLET, FILM COATED ORAL at 21:36

## 2024-01-02 RX ADMIN — OLANZAPINE 5 MG: 5 TABLET, FILM COATED ORAL at 21:36

## 2024-01-02 RX ADMIN — BENZTROPINE MESYLATE 0.5 MG: 0.5 TABLET ORAL at 08:25

## 2024-01-02 RX ADMIN — NICOTINE POLACRILEX 4 MG: 4 GUM, CHEWING ORAL at 15:09

## 2024-01-02 RX ADMIN — ESCITALOPRAM OXALATE 20 MG: 10 TABLET ORAL at 08:25

## 2024-01-02 RX ADMIN — NICOTINE POLACRILEX 4 MG: 4 GUM, CHEWING ORAL at 08:50

## 2024-01-02 RX ADMIN — ERGOCALCIFEROL 50000 UNITS: 1.25 CAPSULE ORAL at 10:00

## 2024-01-02 RX ADMIN — NICOTINE POLACRILEX 4 MG: 4 GUM, CHEWING ORAL at 18:14

## 2024-01-02 NOTE — PROGRESS NOTES
01/02/24 3310   Team Meeting   Meeting Type Daily Rounds   Team Members Present   Team Members Present Physician;Nurse;   Physician Team Member Mark   Nursing Team Member Giancarlo   Care Management Team Member Miguelito   Patient/Family Present   Patient Present No   Patient's Family Present No     Patient currently holds 201 status. Patient continues to report AH. Patient is compliant with medications and meals. Patient to take decreased dose of Zyprexa. Patient discharge date and time to be determined.

## 2024-01-02 NOTE — PROGRESS NOTES
"Progress Note - Behavioral Health     Alberto Berumen 28 y.o. male MRN: [id number]   Unit/Bed#: [facility] 383-02 Encounter: [id number]    Behavior over the last 24 hours: minimal improvement.     Alberto still has auditory hallucinations of \"voices saying that they will kill me and my family and that I will go to hell\". He reports only limited improvement on his medications \"Voices are kind of the same\". He remains seclusive to his room and his milieu participation is limited. He denies suicidal or homicidal thoughts. Poor eye contact, some psychomotor retardation is noted. Compliant with medications.    Sleep: normal  Appetite: normal  Medication side effects: No   ROS: no complaints, denies any headache, chest pain, or abdominal pain, all other systems are negative    Mental Status Evaluation:    Appearance:  casually dressed   Behavior:  guarded, limited eye contact, psychomotor retardation   Speech:  scant, soft   Mood:  dysphoric   Affect:  blunted   Thought Process:  concrete   Associations: concrete associations   Thought Content:  paranoid ideation   Perceptual Disturbances: auditory hallucinations of \"voices saying that they will kill me and my family and that I will go to hell\", no visual hallucinations   Risk Potential: Suicidal ideation - None at present  Homicidal ideation - None  Potential for aggression - No   Sensorium:  oriented to person, place, and time/date   Memory:  recent and remote memory grossly intact   Consciousness:  alert and awake   Attention/Concentration: decreased concentration and decreased attention span   Insight:  impaired   Judgment: impaired   Gait/Station: normal gait/station, normal balance   Motor Activity: no abnormal movements     Vital signs in last 24 hours:    Temp:  [98.1 °F (36.7 °C)-98.5 °F (36.9 °C)] 98.5 °F (36.9 °C)  HR:  [105-107] 105  Resp:  [16-18] 18  BP: (139-153)/(92-98) 153/92    Laboratory results: I have personally reviewed all pertinent laboratory/tests " results    Results from the past 24 hours: No results found for this or any previous visit (from the past 24 hour(s)).    Suicide/Homicide Risk Assessment:    Risk of Harm to Self:   Nursing Suicide Risk Assessment Last 24 hours: C-SSRS Risk (Since Last Contact)  Calculated C-SSRS Risk Score (Since Last Contact): No Risk Indicated  Current Specific Risk Factors include: mental illness diagnosis, current psychotic symptoms  Protective Factors: no current suicidal ideation, ability to communicate with staff on the unit, taking medications as ordered on the unit  Based on today's assessment, Alberto presents the following risk of harm to self: low    Risk of Harm to Others:  Nursing Homicide Risk Assessment: Violence Risk to Others: Denies within past 6 months  Based on today's assessment, Alberto presents the following risk of harm to others: low    The following interventions are recommended: behavioral checks every 7 minutes, continued hospitalization on locked unit    Progress Toward Goals: minimal improvement, poor motivation, denies current suicidal thoughts, still reports psychotic symptoms    Assessment/Plan   Principal Problem:    Schizoaffective disorder, bipolar type (HCC)  Active Problems:    GERD (gastroesophageal reflux disease)    Medical clearance for psychiatric admission    Tobacco abuse    T wave inversion in EKG    Chronic idiopathic constipation    Vitamin B 12 deficiency    Vitamin D deficiency      Recommended Treatment:     Planned medication and treatment changes:    All current active medications have been reviewed  Encourage group therapy, milieu therapy and occupational therapy  Behavioral Health checks every 7 minutes  Await transfer of insurance benefits to [place] to start treatment with Clozaril  Increase Loxapine to 30 mg bid today to help with psychotic symptoms. Titrate dose gradually  Decrease Zyprexa to 5 mg at bedtime from tomorrow and taper off    Continue all other  medications:    Current Facility-Administered Medications   Medication Dose Route Frequency Provider Last Rate    acetaminophen  650 mg Oral Q6H PRN Yasmin Harvey MD      acetaminophen  650 mg Oral Q4H PRN Yasmin Harvey MD      acetaminophen  975 mg Oral Q6H PRN Yasmin Harvey MD      aluminum-magnesium hydroxide-simethicone  30 mL Oral Q4H PRN Yasmin Harvey MD      haloperidol lactate  2.5 mg Intramuscular Q4H PRN Max 4/day Yasmin Harvey MD      And    LORazepam  1 mg Intramuscular Q4H PRN Max 4/day Yasmin Harvey MD      And    benztropine  0.5 mg Intramuscular Q4H PRN Max 4/day Yasmin Harvey MD      haloperidol lactate  5 mg Intramuscular Q4H PRN Max 4/day Yasmin Harvey MD      And    LORazepam  2 mg Intramuscular Q4H PRN Max 4/day Yasmin Harvey MD      And    benztropine  1 mg Intramuscular Q4H PRN Max 4/day Yasmni Harvey MD      benztropine  0.5 mg Oral BID La Flores MD      benztropine  1 mg Oral Q4H PRN Max 6/day Yasmin Harvey MD      bisacodyl  10 mg Rectal Daily PRN Yasmin Harvey MD      cyanocobalamin  1,000 mcg Oral Daily HOLLI Monge      cyanocobalamin  1,000 mcg Intramuscular Q30 Days HOLLI Galvan      hydrOXYzine HCL  50 mg Oral Q6H PRN Max 4/day Yasmin Harvey MD      Or    diphenhydrAMINE  50 mg Intramuscular Q6H PRN Yasmin Harvey MD      diphenhydrAMINE-zinc acetate   Topical BID PRN Anjel Sagastume MD      ergocalciferol  50,000 Units Oral Weekly HOLLI Galvan      escitalopram  20 mg Oral Daily La Flores MD      haloperidol  1 mg Oral Q6H PRN Yasmin Harvey MD      haloperidol  2.5 mg Oral Q4H PRN Max 4/day Yasmin Harvey MD      haloperidol  5 mg Oral Q4H PRN Max 4/day Yasmin Harvey MD      hydrocortisone   Topical BID Anjel Sagastume MD      hydrOXYzine HCL  100 mg Oral Q6H PRN Max 4/day Yasmin Harvey MD      Or    LORazepam  2 mg Intramuscular Q6H PRN Yasmin Harvey MD       hydrOXYzine HCL  25 mg Oral Q6H PRN Max 4/day Yasmin Harvey MD      lithium carbonate  1,200 mg Oral HS HOLLI Anderson      lithium carbonate  300 mg Oral Daily HOLLI Anderson      loxapine  30 mg Oral HS Damari Larose MD      loxapine  30 mg Oral Daily La Flores MD      melatonin  3 mg Oral HS Yasmin Harvey MD      mirtazapine  45 mg Oral HS La Flores MD      nicotine polacrilex  4 mg Oral Q2H PRN Yasmin Harvey MD      OLANZapine  5 mg Oral HS La Flores MD      polyethylene glycol  17 g Oral Daily PRN Yasmin Harvey MD      polyethylene glycol  17 g Oral Daily HOLLI Monge      senna-docusate sodium  1 tablet Oral Daily PRN Yasmin Harvey MD      traZODone  50 mg Oral HS PRN Yasmin Harvey MD      white petrolatum-mineral oil   Topical TID PRN Anjel Sagastume MD       Risks / Benefits of Treatment:    Risks, benefits, and possible side effects of medications explained to patient. Patient has limited understanding of risks and benefits of treatment at this time, but agrees to take medications as prescribed.    Counseling / Coordination of Care:    Patient's progress discussed with staff in treatment team meeting.  Medications, treatment progress and treatment plan reviewed with patient.  Medication changes discussed with patient.    La Flores MD 01/02/24

## 2024-01-02 NOTE — NURSING NOTE
"Pt visible and has a sense of humor. Pt is social with peers and cooperative. Pt is blunted in affect and endorses AH, \"saying they're going to kill me and my family\" pt states that he has trouble sleeping at times and requested trazodone. PRN trazodone 50mg given @ 21:45. Pt medication adherent, denies SI/HI/VH currently.   "

## 2024-01-02 NOTE — NURSING NOTE
Pt denies SI, HI, and VH. Pt continues to report ongoing AH. Pt has no complaints or concerns. Pt is visible in the milieu and social with peers. Pt is calm, cooperative and pleasant. Medication and meal compliant.

## 2024-01-03 PROCEDURE — 99232 SBSQ HOSP IP/OBS MODERATE 35: CPT | Performed by: PSYCHIATRY & NEUROLOGY

## 2024-01-03 RX ADMIN — OLANZAPINE 5 MG: 5 TABLET, FILM COATED ORAL at 21:10

## 2024-01-03 RX ADMIN — LOXAPINE 40 MG: 25 CAPSULE ORAL at 21:10

## 2024-01-03 RX ADMIN — LITHIUM CARBONATE 1200 MG: 450 TABLET, EXTENDED RELEASE ORAL at 21:10

## 2024-01-03 RX ADMIN — HYDROCORTISONE 1 APPLICATION: 1 CREAM TOPICAL at 08:05

## 2024-01-03 RX ADMIN — LOXAPINE 30 MG: 25 CAPSULE ORAL at 08:04

## 2024-01-03 RX ADMIN — BENZTROPINE MESYLATE 0.5 MG: 0.5 TABLET ORAL at 17:20

## 2024-01-03 RX ADMIN — BENZTROPINE MESYLATE 0.5 MG: 0.5 TABLET ORAL at 08:04

## 2024-01-03 RX ADMIN — POLYETHYLENE GLYCOL 3350 17 G: 17 POWDER, FOR SOLUTION ORAL at 08:05

## 2024-01-03 RX ADMIN — LITHIUM CARBONATE 300 MG: 300 TABLET, EXTENDED RELEASE ORAL at 08:04

## 2024-01-03 RX ADMIN — NICOTINE POLACRILEX 4 MG: 4 GUM, CHEWING ORAL at 13:08

## 2024-01-03 RX ADMIN — MELATONIN TAB 3 MG 3 MG: 3 TAB at 21:10

## 2024-01-03 RX ADMIN — ESCITALOPRAM OXALATE 20 MG: 10 TABLET ORAL at 08:04

## 2024-01-03 RX ADMIN — NICOTINE POLACRILEX 4 MG: 4 GUM, CHEWING ORAL at 18:37

## 2024-01-03 RX ADMIN — NICOTINE POLACRILEX 4 MG: 4 GUM, CHEWING ORAL at 09:27

## 2024-01-03 RX ADMIN — MIRTAZAPINE 45 MG: 30 TABLET, FILM COATED ORAL at 21:10

## 2024-01-03 RX ADMIN — HYDROCORTISONE 1 APPLICATION: 1 CREAM TOPICAL at 17:21

## 2024-01-03 NOTE — PROGRESS NOTES
01/03/24 1509   Team Meeting   Meeting Type Daily Rounds   Team Members Present   Team Members Present Physician;Nurse;   Physician Team Member Mark   Nursing Team Member Giancarlo   Care Management Team Member Miguelito   Patient/Family Present   Patient Present No   Patient's Family Present No     Patient currently holds 201 status. Patient reports AH. Patient is medication and meal compliant. Patient to continue on Loxapine 40 mg daily. Patient discharge date and plan to be determined.

## 2024-01-03 NOTE — PLAN OF CARE
Patient participation in unit group/activities has declined significantly over the past two weeks.

## 2024-01-03 NOTE — CASE MANAGEMENT
received a call from Wanda [facility] case manger. Writer explained current status with patients insurance. Writer added she called [facility] office yesterday three time with patient and was disconnected each time. Writer explained she would be immensely appreciate if Wanda attempted to call the [facility] regarding the status of his MA application. Wanda agreed, and explained she would be in touch with .

## 2024-01-03 NOTE — PROGRESS NOTES
"Progress Note - Behavioral Health     Alberto Berumen 28 y.o. male MRN: [id number]   Unit/Bed#: [facility] 383-02 Encounter: [id number]    Behavior over the last 24 hours: unchanged.     Alberto continues to report auditory hallucinations of \"voices saying that they are going to kill me and my family and that I am going to hell. They are still there\". He remains seclusive and withdrawn with limited milieu participation. Blunted affect, poor eye contact. Compliant with medications.    Sleep: normal  Appetite: normal  Medication side effects: No   ROS: no complaints, denies any shortness of breath, chest pain, or abdominal pain, all other systems are negative    Mental Status Evaluation:    Appearance:  casually dressed   Behavior:  cooperative, limited eye contact, psychomotor retardation   Speech:  scant, soft   Mood:  dysphoric   Affect:  blunted   Thought Process:  concrete   Associations: concrete associations   Thought Content:  paranoid ideation   Perceptual Disturbances: auditory hallucinations of \"voices saying that they are going to kill me and my family and that I am going to hell\", no visual hallucinations   Risk Potential: Suicidal ideation - None at present  Homicidal ideation - None at present  Potential for aggression - No   Sensorium:  oriented to person, place, and time/date   Memory:  recent and remote memory grossly intact   Consciousness:  alert and awake   Attention/Concentration: poor concentration and poor attention span   Insight:  impaired   Judgment: impaired   Gait/Station: normal gait/station, normal balance   Motor Activity: no abnormal movements     Vital signs in last 24 hours:    Temp:  [98.3 °F (36.8 °C)-98.5 °F (36.9 °C)] 98.5 °F (36.9 °C)  HR:  [105-111] 105  Resp:  [18] 18  BP: (143-144)/(69-84) 143/69    Laboratory results: I have personally reviewed all pertinent laboratory/tests results    Results from the past 24 hours: No results found for this or any previous visit (from the past 24 " hour(s)).    Suicide/Homicide Risk Assessment:    Risk of Harm to Self:   Nursing Suicide Risk Assessment Last 24 hours: C-SSRS Risk (Since Last Contact)  Calculated C-SSRS Risk Score (Since Last Contact): No Risk Indicated  Current Specific Risk Factors include: mental illness diagnosis, current psychotic symptoms, presence of hallucinations  Protective Factors: no current suicidal ideation, ability to communicate with staff on the unit, taking medications as ordered on the unit  Based on today's assessment, Alberto presents the following risk of harm to self: low    Risk of Harm to Others:  Nursing Homicide Risk Assessment: Violence Risk to Others: Denies within past 6 months  Based on today's assessment, Alberto presents the following risk of harm to others: low    The following interventions are recommended: behavioral checks every 7 minutes, continued hospitalization on locked unit    Progress Toward Goals: no significant progress, poor motivation, denies current suicidal thoughts, still has psychotic symptoms    Assessment/Plan   Principal Problem:    Schizoaffective disorder, bipolar type (HCC)  Active Problems:    GERD (gastroesophageal reflux disease)    Medical clearance for psychiatric admission    Tobacco abuse    T wave inversion in EKG    Chronic idiopathic constipation    Vitamin B 12 deficiency    Vitamin D deficiency      Recommended Treatment:     Planned medication and treatment changes:    All current active medications have been reviewed  Encourage group therapy, milieu therapy and occupational therapy  Behavioral Health checks every 7 minutes  Await transfer of insurance benefits to [place] to start treatment with Clozaril  Increase Loxapine to 40 mg bid and titrate dose to help with psychotic symptoms. Plan to switch to Clozaril once insurance is approved  Discontinue Zyprexa after the last dose today    Continue all other medications:    Current Facility-Administered Medications    Medication Dose Route Frequency Provider Last Rate    acetaminophen  650 mg Oral Q6H PRN Yasmin Harvey MD      acetaminophen  650 mg Oral Q4H PRN Yasmin Harvey MD      acetaminophen  975 mg Oral Q6H PRN Yasmin Harvey MD      aluminum-magnesium hydroxide-simethicone  30 mL Oral Q4H PRN Yasmin Harvey MD      haloperidol lactate  2.5 mg Intramuscular Q4H PRN Max 4/day Yasmin Harvey MD      And    LORazepam  1 mg Intramuscular Q4H PRN Max 4/day Yasmin Harvey MD      And    benztropine  0.5 mg Intramuscular Q4H PRN Max 4/day Yasmin Harvey MD      haloperidol lactate  5 mg Intramuscular Q4H PRN Max 4/day Yasmin Harvey MD      And    LORazepam  2 mg Intramuscular Q4H PRN Max 4/day Yasmin Harvey MD      And    benztropine  1 mg Intramuscular Q4H PRN Max 4/day Yasmin Harvey MD      benztropine  0.5 mg Oral BID La Flores MD      benztropine  1 mg Oral Q4H PRN Max 6/day Yasmin Harvey MD      bisacodyl  10 mg Rectal Daily PRN Yasmin Harvey MD      hydrOXYzine HCL  50 mg Oral Q6H PRN Max 4/day Yasmin Harvey MD      Or    diphenhydrAMINE  50 mg Intramuscular Q6H PRN Yasmin Harvey MD      diphenhydrAMINE-zinc acetate   Topical BID PRN Anjel Sagastume MD      escitalopram  20 mg Oral Daily La Flores MD      haloperidol  1 mg Oral Q6H PRN Yasmin Harvey MD      haloperidol  2.5 mg Oral Q4H PRN Max 4/day Yasmin Harvey MD      haloperidol  5 mg Oral Q4H PRN Max 4/day Yasmin Harvey MD      hydrocortisone   Topical BID Anjel Sagastume MD      hydrOXYzine HCL  100 mg Oral Q6H PRN Max 4/day Yasmin Harvey MD      Or    LORazepam  2 mg Intramuscular Q6H PRN Yasmin Harvey MD      hydrOXYzine HCL  25 mg Oral Q6H PRN Max 4/day Yasmin Harvey MD      lithium carbonate  1,200 mg Oral HS HOLLI Anderson      lithium carbonate  300 mg Oral Daily HOLLI Anderson      loxapine  40 mg Oral HS La Flores MD      [START ON 1/4/2024] loxapine  40  mg Oral Daily La Flores MD      melatonin  3 mg Oral HS Yasmin Harvey MD      mirtazapine  45 mg Oral HS La Flores MD      nicotine polacrilex  4 mg Oral Q2H PRN Yasmin Harvey MD      OLANZapine  5 mg Oral HS La Flores MD      polyethylene glycol  17 g Oral Daily PRN Yasmin Harvey MD      polyethylene glycol  17 g Oral Daily HOLLI Monge      senna-docusate sodium  1 tablet Oral Daily PRN Yasmin Harvey MD      traZODone  50 mg Oral HS PRN Yasmin Harvey MD      white petrolatum-mineral oil   Topical TID PRN Anjel Sagastume MD       Risks / Benefits of Treatment:    Risks, benefits, and possible side effects of medications explained to patient. Patient has limited understanding of risks and benefits of treatment at this time, but agrees to take medications as prescribed.    Counseling / Coordination of Care:    Patient's progress discussed with staff in treatment team meeting.  Medications, treatment progress and treatment plan reviewed with patient.  Medication changes discussed with patient.    La Flores MD 01/03/24

## 2024-01-03 NOTE — NURSING NOTE
Pt denies SI and HI; coherent and calm. Cites mild depression due to internal stimulation. Verbalizes that audio hallucinations with violent and suicidal references continue. Appearance is in personal attire, compliant with meds, observed usually resting in bed, quiet and selectively joins milieu, pleasant with staff. No judgement or behavioral concerns. Pt is not threat to self and verbalizes feeling stable despite living with the voices.

## 2024-01-03 NOTE — NURSING NOTE
Pt calm/cooperative during shift assessment. Visible and social with peers. Denies unmet needs. Denies si.hi. reports AH and states he was been coping with them without prn medication. Compliant with evening meds. Continued q7m checks for safety

## 2024-01-04 PROCEDURE — 99232 SBSQ HOSP IP/OBS MODERATE 35: CPT | Performed by: STUDENT IN AN ORGANIZED HEALTH CARE EDUCATION/TRAINING PROGRAM

## 2024-01-04 RX ADMIN — LOXAPINE 40 MG: 25 CAPSULE ORAL at 09:07

## 2024-01-04 RX ADMIN — BENZTROPINE MESYLATE 0.5 MG: 0.5 TABLET ORAL at 08:28

## 2024-01-04 RX ADMIN — ESCITALOPRAM OXALATE 20 MG: 10 TABLET ORAL at 08:27

## 2024-01-04 RX ADMIN — LOXAPINE 40 MG: 25 CAPSULE ORAL at 21:10

## 2024-01-04 RX ADMIN — MELATONIN TAB 3 MG 3 MG: 3 TAB at 21:10

## 2024-01-04 RX ADMIN — BENZTROPINE MESYLATE 0.5 MG: 0.5 TABLET ORAL at 17:36

## 2024-01-04 RX ADMIN — POLYETHYLENE GLYCOL 3350 17 G: 17 POWDER, FOR SOLUTION ORAL at 08:27

## 2024-01-04 RX ADMIN — NICOTINE POLACRILEX 4 MG: 4 GUM, CHEWING ORAL at 13:50

## 2024-01-04 RX ADMIN — MIRTAZAPINE 45 MG: 30 TABLET, FILM COATED ORAL at 21:10

## 2024-01-04 RX ADMIN — NICOTINE POLACRILEX 4 MG: 4 GUM, CHEWING ORAL at 19:50

## 2024-01-04 RX ADMIN — HYDROCORTISONE: 1 CREAM TOPICAL at 09:08

## 2024-01-04 RX ADMIN — NICOTINE POLACRILEX 4 MG: 4 GUM, CHEWING ORAL at 09:28

## 2024-01-04 RX ADMIN — HYDROCORTISONE 1 APPLICATION: 1 CREAM TOPICAL at 17:44

## 2024-01-04 RX ADMIN — LITHIUM CARBONATE 1200 MG: 450 TABLET, EXTENDED RELEASE ORAL at 21:10

## 2024-01-04 RX ADMIN — LITHIUM CARBONATE 300 MG: 300 TABLET, EXTENDED RELEASE ORAL at 08:27

## 2024-01-04 NOTE — PROGRESS NOTES
01/04/24 1554   Team Meeting   Meeting Type Daily Rounds   Team Members Present   Team Members Present Physician;Nurse;   Physician Team Member Jose Luis   Nursing Team Member Giancarlo   Care Management Team Member Miguelito   Patient/Family Present   Patient Present No   Patient's Family Present No     Patient currently holds 201 status. Patient continues to report AH. Patient is medication and meal compliant. Patient to continue on current scheduled medications. Patient discharge date and time to be determined.

## 2024-01-04 NOTE — NURSING NOTE
"Pt calm pleasant and cooperative, Pt isolated in room in bed most of shift, but was seen in the dayroom for a little bit and was out of his room for snack time, Pt stated \"Today wasn't a bad day but it wasn't a good day, it was just average\" Pt denied all pain anxiety and depression, Pt denies HI and SI , Pt denied AH and VH, Pt took all scheduled HS meds, Q7 min safety checks maintained  "

## 2024-01-04 NOTE — PROGRESS NOTES
"Progress Note - Behavioral Health   Alberto Berumen 28 y.o. male MRN: [id number]  Unit/Bed#: -02 Encounter: [id number]    Assessment/Plan   Principal Problem:    Schizoaffective disorder, bipolar type (HCC)  Active Problems:    GERD (gastroesophageal reflux disease)    Medical clearance for psychiatric admission    Tobacco abuse    T wave inversion in EKG    Chronic idiopathic constipation    Vitamin B 12 deficiency    Vitamin D deficiency    Recommended Treatment:   Continue current medications.  Group, individual, milieu therapy.  Continue behavioral health checks per routine.  Medical management per SLIM.  Discharge planning    ----------------------------------------      Subjective: Per nursing report, patient continues to report auditory hallucinations although is noted to be intermittently present in the milieu interacting appropriately with staff and peers.  Case management continuing to work on approval of medical assistance.    On exam patient seen lying in bed.  Presents with blunted affect and depressed mood.  Still reporting ongoing auditory hallucinations to \"kill my family\".  Reports that he is tolerating the medications without any major side effects.  No SI, HI, AVH reported today.    Behavior over the last 24 hours:  unchanged  Sleep: normal  Appetite: normal  Medication side effects: No  ROS: no complaints and all other systems are negative    Mental Status Evaluation:  Appearance:  casually dressed   Behavior:  pleasant, cooperative   Speech:  scant, soft   Mood:  dysphoric   Affect:  blunted   Thought Process:  coherent, goal directed   Associations: concrete associations   Thought Content:  paranoid ideation   Perceptual Disturbances: auditory hallucinations of voices to \"kill my family\"   Risk Potential: Suicidal ideation - None at present  Homicidal ideation - None at present  Potential for aggression - No   Sensorium:  oriented to person, place, and time/date   Memory:  recent and " remote memory grossly intact   Consciousness:  alert and awake   Attention/Concentration: attention span and concentration appear shorter than expected for age   Insight:  limited   Judgment: limited   Gait/Station: normal gait/station   Motor Activity: no abnormal movements     Medications: all current active meds have been reviewed and continue current psychiatric medications.  Current Facility-Administered Medications   Medication Dose Route Frequency Provider Last Rate    acetaminophen  650 mg Oral Q6H PRN Yasmin Harvey MD      acetaminophen  650 mg Oral Q4H PRN Yasmin Harvey MD      acetaminophen  975 mg Oral Q6H PRN Yasmin Harvey MD      aluminum-magnesium hydroxide-simethicone  30 mL Oral Q4H PRN Yasmin Harvey MD      haloperidol lactate  2.5 mg Intramuscular Q4H PRN Max 4/day Yasmin Harvey MD      And    LORazepam  1 mg Intramuscular Q4H PRN Max 4/day Yasmin Harvey MD      And    benztropine  0.5 mg Intramuscular Q4H PRN Max 4/day Yasmin Harvey MD      haloperidol lactate  5 mg Intramuscular Q4H PRN Max 4/day Yasmin Harvey MD      And    LORazepam  2 mg Intramuscular Q4H PRN Max 4/day Yasmin Harvey MD      And    benztropine  1 mg Intramuscular Q4H PRN Max 4/day Yasmin Harvey MD      benztropine  0.5 mg Oral BID La Flores MD      benztropine  1 mg Oral Q4H PRN Max 6/day Yasmin Harvey MD      bisacodyl  10 mg Rectal Daily PRN Yasmin Harvey MD      hydrOXYzine HCL  50 mg Oral Q6H PRN Max 4/day Yasmin Harvey MD      Or    diphenhydrAMINE  50 mg Intramuscular Q6H PRN Yasmin Harvey MD      diphenhydrAMINE-zinc acetate   Topical BID PRN Anjel Sagastume MD      escitalopram  20 mg Oral Daily La Flores MD      haloperidol  1 mg Oral Q6H PRN Yasmin Harvey MD      haloperidol  2.5 mg Oral Q4H PRN Max 4/day Yasmin Harvey MD      haloperidol  5 mg Oral Q4H PRN Max 4/day Yasmin Harvey MD      hydrocortisone   Topical BID Anjel Sagastume  MD      hydrOXYzine HCL  100 mg Oral Q6H PRN Max 4/day Yasmin Harvey MD      Or    LORazepam  2 mg Intramuscular Q6H PRN Yasmin Harvey MD      hydrOXYzine HCL  25 mg Oral Q6H PRN Max 4/day Yasmin Harvey MD      lithium carbonate  1,200 mg Oral HS Prisca Villafuerte, HOLLI      lithium carbonate  300 mg Oral Daily HOLLI Anderson      loxapine  40 mg Oral HS La Flores MD      loxapine  40 mg Oral Daily La Flores MD      melatonin  3 mg Oral HS Yasmin Harvey MD      mirtazapine  45 mg Oral HS La Flores MD      nicotine polacrilex  4 mg Oral Q2H PRN Yasmin Harvey MD      polyethylene glycol  17 g Oral Daily PRN Yasmin Harvey MD      polyethylene glycol  17 g Oral Daily HOLLI Monge      senna-docusate sodium  1 tablet Oral Daily PRN Yasmin Harvey MD      traZODone  50 mg Oral HS PRN Yasmin Harvey MD      white petrolatum-mineral oil   Topical TID PRN Anjel Sagastume MD         Labs: I have personally reviewed all pertinent laboratory/tests results  Last Laboratory Results with Lithium level:   Lab Results   Component Value Date    SODIUM 137 12/15/2023    K 4.2 12/15/2023     12/15/2023    CO2 29 12/15/2023    BUN 10 12/15/2023    CREATININE 0.85 12/15/2023    GLUC 101 12/15/2023    CALCIUM 9.6 12/15/2023    LITHIUM 0.6 12/13/2023       Progress Toward Goals: progressing    Risks / Benefits of Treatment:    Risks, benefits, and possible side effects of medications explained to patient and patient verbalizes understanding and agreement for treatment.    Counseling / Coordination of Care:    Total floor / unit time spent today 25 minutes. Greater than 50% of total time was spent with the patient and / or family counseling and / or coordination of care. A description of counseling / coordination of care:  Patient's progress discussed with staff in treatment team meeting.  Medications, treatment progress and treatment plan reviewed with  patient.  Reassurance and supportive therapy provided.  Encouraged participation in milieu and group therapy on the unit.    Bora Amaya MD 01/04/24

## 2024-01-04 NOTE — NURSING NOTE
PT observed visible and interacting with selected peers. PT continues to endorse AH of voices telling him that they will kill him and his family. Denies SI/HI/VH, meds and meals compliant, good hygiene  observed on personal attire and attending groups. No PRNs at this time.

## 2024-01-05 LAB
ATRIAL RATE: 94 BPM
P AXIS: 38 DEGREES
PR INTERVAL: 152 MS
QRS AXIS: 37 DEGREES
QRSD INTERVAL: 88 MS
QT INTERVAL: 358 MS
QTC INTERVAL: 447 MS
T WAVE AXIS: -7 DEGREES
VENTRICULAR RATE: 94 BPM

## 2024-01-05 PROCEDURE — 93010 ELECTROCARDIOGRAM REPORT: CPT | Performed by: STUDENT IN AN ORGANIZED HEALTH CARE EDUCATION/TRAINING PROGRAM

## 2024-01-05 PROCEDURE — 93005 ELECTROCARDIOGRAM TRACING: CPT

## 2024-01-05 PROCEDURE — 99232 SBSQ HOSP IP/OBS MODERATE 35: CPT | Performed by: STUDENT IN AN ORGANIZED HEALTH CARE EDUCATION/TRAINING PROGRAM

## 2024-01-05 RX ADMIN — LITHIUM CARBONATE 1200 MG: 450 TABLET, EXTENDED RELEASE ORAL at 21:10

## 2024-01-05 RX ADMIN — MIRTAZAPINE 45 MG: 30 TABLET, FILM COATED ORAL at 21:10

## 2024-01-05 RX ADMIN — NICOTINE POLACRILEX 4 MG: 4 GUM, CHEWING ORAL at 18:17

## 2024-01-05 RX ADMIN — BENZTROPINE MESYLATE 0.5 MG: 0.5 TABLET ORAL at 17:14

## 2024-01-05 RX ADMIN — BENZTROPINE MESYLATE 0.5 MG: 0.5 TABLET ORAL at 08:19

## 2024-01-05 RX ADMIN — NICOTINE POLACRILEX 4 MG: 4 GUM, CHEWING ORAL at 09:10

## 2024-01-05 RX ADMIN — POLYETHYLENE GLYCOL 3350 17 G: 17 POWDER, FOR SOLUTION ORAL at 08:17

## 2024-01-05 RX ADMIN — LOXAPINE 40 MG: 25 CAPSULE ORAL at 21:10

## 2024-01-05 RX ADMIN — ESCITALOPRAM OXALATE 20 MG: 10 TABLET ORAL at 08:19

## 2024-01-05 RX ADMIN — NICOTINE POLACRILEX 4 MG: 4 GUM, CHEWING ORAL at 13:00

## 2024-01-05 RX ADMIN — LITHIUM CARBONATE 300 MG: 300 TABLET, EXTENDED RELEASE ORAL at 08:19

## 2024-01-05 RX ADMIN — MELATONIN TAB 3 MG 3 MG: 3 TAB at 21:10

## 2024-01-05 RX ADMIN — LOXAPINE 40 MG: 25 CAPSULE ORAL at 08:19

## 2024-01-05 RX ADMIN — HYDROCORTISONE: 1 CREAM TOPICAL at 08:18

## 2024-01-05 RX ADMIN — HYDROCORTISONE: 1 CREAM TOPICAL at 17:13

## 2024-01-05 NOTE — NURSING NOTE
Pt awake and alert, calm and cooperative. Pt continues to report violent AH, states they are manageable, denies SI/HI/VH at this time. Pt endorses mild depression. Pt compliant with scheduled medications and meals, appetite good. Appropriate when in the milieu, social with select peers. Remains in behavioral control. Denies any unmet needs at this time. Q7 minute safety checks maintained.

## 2024-01-05 NOTE — PROGRESS NOTES
"Progress Note - Behavioral Health   Alberto Berumen 28 y.o. male MRN: [id number]  Unit/Bed#: U 383-02 Encounter: [id number]    Assessment/Plan   Principal Problem:    Schizoaffective disorder, bipolar type (HCC)  Active Problems:    GERD (gastroesophageal reflux disease)    Medical clearance for psychiatric admission    Tobacco abuse    T wave inversion in EKG    Chronic idiopathic constipation    Vitamin B 12 deficiency    Vitamin D deficiency    Recommended Treatment:   Continue current medications.  Group, individual, milieu therapy.  Continue behavioral health checks per routine.  Medical management per SLIM.  Discharge planning    ----------------------------------------      Subjective: Per nursing report patient noted to be pleasant calm, cooperative intermittently social with peers in the milieu.  Compliant with medications and meals.  Per case management medical assistance is still pending.  May adjust to Clozaril once approved.    He reports his mood is \"okay\".  Continues to report feeling depressed, endorsing ongoing command auditory hallucinations that he and his family are going to be killed.  Denies any side effects to the medications thus far.  Will complete EKG to assess QTc.    Behavior over the last 24 hours:  unchanged  Sleep: normal  Appetite: normal  Medication side effects: No  ROS: no complaints and all other systems are negative    Mental Status Evaluation:  Appearance:  marginal hygiene, overweight   Behavior:  cooperative, calm, decreased eye contact   Speech:  soft   Mood:  dysphoric   Affect:  blunted   Thought Process:  concrete   Associations: concrete associations   Thought Content:  no overt delusions   Perceptual Disturbances: no auditory hallucinations, no visual hallucinations, does not appear responding to internal stimuli   Risk Potential: Suicidal ideation - None at present  Homicidal ideation - None at present  Potential for aggression - No   Sensorium:  oriented to person, " place, and time/date   Memory:  recent and remote memory grossly intact   Consciousness:  alert and awake   Attention/Concentration: attention span and concentration appear shorter than expected for age   Insight:  limited   Judgment: limited   Gait/Station: normal gait/station   Motor Activity: no abnormal movements     Medications: all current active meds have been reviewed and continue current psychiatric medications.  Current Facility-Administered Medications   Medication Dose Route Frequency Provider Last Rate    acetaminophen  650 mg Oral Q6H PRN Yasmin Harvey MD      acetaminophen  650 mg Oral Q4H PRN Yasmin Harvey MD      acetaminophen  975 mg Oral Q6H PRN Yasmin Harvey MD      aluminum-magnesium hydroxide-simethicone  30 mL Oral Q4H PRN Yasmin Harvey MD      haloperidol lactate  2.5 mg Intramuscular Q4H PRN Max 4/day Yasmin Harvey MD      And    LORazepam  1 mg Intramuscular Q4H PRN Max 4/day Yasmin Harvey MD      And    benztropine  0.5 mg Intramuscular Q4H PRN Max 4/day Yasmin Harvey MD      haloperidol lactate  5 mg Intramuscular Q4H PRN Max 4/day Yasmin Harvey MD      And    LORazepam  2 mg Intramuscular Q4H PRN Max 4/day Yasmin Harvey MD      And    benztropine  1 mg Intramuscular Q4H PRN Max 4/day Yasmin Harvey MD      benztropine  0.5 mg Oral BID La Flores MD      benztropine  1 mg Oral Q4H PRN Max 6/day Yasmin Harvey MD      bisacodyl  10 mg Rectal Daily PRN Yasmin Harvey MD      hydrOXYzine HCL  50 mg Oral Q6H PRN Max 4/day Yasmin Harvey MD      Or    diphenhydrAMINE  50 mg Intramuscular Q6H PRN Yasmin Harvey MD      diphenhydrAMINE-zinc acetate   Topical BID PRN Anjel Sagastume MD      escitalopram  20 mg Oral Daily La Flores MD      haloperidol  1 mg Oral Q6H PRN Yasmin Harvey MD      haloperidol  2.5 mg Oral Q4H PRN Max 4/day Yasmin Harvey MD      haloperidol  5 mg Oral Q4H PRN Max 4/day Yasmin Harvey MD       hydrocortisone   Topical BID Anjel Sagastume MD      hydrOXYzine HCL  100 mg Oral Q6H PRN Max 4/day Yasmin Harvey MD      Or    LORazepam  2 mg Intramuscular Q6H PRN Yasmin Harvey MD      hydrOXYzine HCL  25 mg Oral Q6H PRN Max 4/day Yasmin Harvey MD      lithium carbonate  1,200 mg Oral HS Prisca Villafuerte, HOLLI      lithium carbonate  300 mg Oral Daily HOLLI Anderson      loxapine  40 mg Oral HS La Flores MD      loxapine  40 mg Oral Daily La Flores MD      melatonin  3 mg Oral HS Yasmin Harvey MD      mirtazapine  45 mg Oral HS La Flores MD      nicotine polacrilex  4 mg Oral Q2H PRN Yasmin Harvey MD      polyethylene glycol  17 g Oral Daily PRN Yasmin Harvey MD      polyethylene glycol  17 g Oral Daily HOLLI Monge      senna-docusate sodium  1 tablet Oral Daily PRN Yasmin Harvey MD      traZODone  50 mg Oral HS PRN Yasmin Harvey MD      white petrolatum-mineral oil   Topical TID PRN Anjel Sagastume MD         Labs: I have personally reviewed all pertinent laboratory/tests results  Most Recent Labs:   Lab Results   Component Value Date    WBC 11.39 (H) 12/31/2023    RBC 5.17 12/31/2023    HGB 12.6 12/31/2023    HCT 41.9 12/31/2023     12/31/2023    RDW 14.1 12/31/2023    NEUTROABS 7.75 (H) 12/31/2023    SODIUM 137 12/15/2023    K 4.2 12/15/2023     12/15/2023    CO2 29 12/15/2023    BUN 10 12/15/2023    CREATININE 0.85 12/15/2023    GLUC 101 12/15/2023    CALCIUM 9.6 12/15/2023    AST 22 11/15/2023    ALT 40 11/15/2023    ALKPHOS 69 11/15/2023    TP 6.5 11/15/2023    ALB 4.1 11/15/2023    TBILI 0.45 11/15/2023    CHOLESTEROL 182 12/15/2023    HDL 56 12/15/2023    TRIG 118 12/15/2023    LDLCALC 102 (H) 12/15/2023    NONHDLC 126 12/15/2023    LITHIUM 0.6 12/13/2023    COI4ZCXBMYFT 1.062 11/15/2023    SYPHILISAB Non-reactive 11/18/2023    HGBA1C 4.9 11/15/2023    EAG 94 11/15/2023       Progress Toward Goals: progressing    Risks /  Benefits of Treatment:    Risks, benefits, and possible side effects of medications explained to patient and patient verbalizes understanding and agreement for treatment.    Counseling / Coordination of Care:    Total floor / unit time spent today 35 minutes. Greater than 50% of total time was spent with the patient and / or family counseling and / or coordination of care. A description of counseling / coordination of care:  Patient's progress discussed with staff in treatment team meeting.  Medications, treatment progress and treatment plan reviewed with patient.  Reassurance and supportive therapy provided.  Encouraged participation in milieu and group therapy on the unit.    Bora Amaya MD 01/05/24

## 2024-01-05 NOTE — CASE MANAGEMENT
Writer called medicaid benefit central line to discuss patients case. Writer was told that his previous application was outdated and in order for him to be approved for an [place] benefit he will need to resubmit his application. Writer to work on application with patient today.

## 2024-01-05 NOTE — PROGRESS NOTES
01/05/24 1120   Team Meeting   Meeting Type Daily Rounds   Team Members Present   Team Members Present Physician;Nurse;   Physician Team Member Jose Luis   Nursing Team Member Giancarlo   Care Management Team Member Miguelito   Patient/Family Present   Patient Present No   Patient's Family Present No     Patient currently holds 201 status. Patient reports AH, but denies all other symptoms. Patient is medication and meal compliant. Patient to continue on current scheduled medications. Patient discharge date and plan to be determined.

## 2024-01-05 NOTE — NURSING NOTE
Pt is calm and cooperative upon approach. Visible in the milieu, social with peers. Reports ongoing AH, states they are manageable. Denies SI, HI, VH, and pain. Compliant with HS medication and snack. Denies unmet needs/ concerns at this time.

## 2024-01-06 PROCEDURE — 99232 SBSQ HOSP IP/OBS MODERATE 35: CPT | Performed by: PSYCHIATRY & NEUROLOGY

## 2024-01-06 RX ADMIN — LOXAPINE 40 MG: 25 CAPSULE ORAL at 21:03

## 2024-01-06 RX ADMIN — POLYETHYLENE GLYCOL 3350 17 G: 17 POWDER, FOR SOLUTION ORAL at 08:44

## 2024-01-06 RX ADMIN — ESCITALOPRAM OXALATE 20 MG: 10 TABLET ORAL at 08:43

## 2024-01-06 RX ADMIN — NICOTINE POLACRILEX 4 MG: 4 GUM, CHEWING ORAL at 18:07

## 2024-01-06 RX ADMIN — LOXAPINE 40 MG: 25 CAPSULE ORAL at 08:43

## 2024-01-06 RX ADMIN — LITHIUM CARBONATE 300 MG: 300 TABLET, EXTENDED RELEASE ORAL at 08:44

## 2024-01-06 RX ADMIN — MELATONIN TAB 3 MG 3 MG: 3 TAB at 21:04

## 2024-01-06 RX ADMIN — MIRTAZAPINE 45 MG: 30 TABLET, FILM COATED ORAL at 21:04

## 2024-01-06 RX ADMIN — NICOTINE POLACRILEX 4 MG: 4 GUM, CHEWING ORAL at 08:43

## 2024-01-06 RX ADMIN — LITHIUM CARBONATE 1200 MG: 450 TABLET, EXTENDED RELEASE ORAL at 21:03

## 2024-01-06 RX ADMIN — BENZTROPINE MESYLATE 0.5 MG: 0.5 TABLET ORAL at 17:36

## 2024-01-06 RX ADMIN — NICOTINE POLACRILEX 4 MG: 4 GUM, CHEWING ORAL at 15:48

## 2024-01-06 RX ADMIN — NICOTINE POLACRILEX 4 MG: 4 GUM, CHEWING ORAL at 13:02

## 2024-01-06 RX ADMIN — BENZTROPINE MESYLATE 0.5 MG: 0.5 TABLET ORAL at 08:44

## 2024-01-06 RX ADMIN — HYDROCORTISONE: 1 CREAM TOPICAL at 08:44

## 2024-01-06 NOTE — NURSING NOTE
Pt observed in milieu and dayroom. Pt is social with peers, pleasant and cooperative with staff. He is wearing clothing from home. Pt denies SI/HI, or pain. Pt denies VH, but reports having AH. As per pt the voices are under control. Medication and meal compliant. No needs expressed this shift.

## 2024-01-06 NOTE — PROGRESS NOTES
"Progress Note - Behavioral Health   Alberto Berumen 28 y.o. male MRN: [id number]  Unit/Bed#: [facility] 383-02 Encounter: [id number]    Alberto was seen for follow-up, chart reviewed and discussed with nursing staff.  Nursing staff notes he continues with residual command hallucinations but has been calm and cooperative.  Compliant with medications.  Visible in the milieu and interacts appropriately with staff and peers.    Patient is calm and cooperative on approach.  States that he continues with auditory hallucinations that say they are going to kill him and his family.  Denies any command hallucinations and denies any suicidal ideation or homicidal ideation.  Reports that his depression is improved since being here in the hospital.  Denies any current anxiety.  Reports that he is sleeping and eating adequately.  No adverse effects noted with his medications.  Had EKG yesterday and QTc is 447    Behavior over the last 24 hours:  unchanged  Sleep: normal  Appetite: normal  Medication side effects: No  ROS: no complaints  /86 (BP Location: Right arm)   Pulse 93   Temp 97.9 °F (36.6 °C) (Temporal)   Resp 18   Ht 5' 7\" (1.702 m)   Wt 116 kg (255 lb)   SpO2 99%   BMI 39.94 kg/m²     Medications:   Current Facility-Administered Medications   Medication Dose Route Frequency    acetaminophen (TYLENOL) tablet 650 mg  650 mg Oral Q6H PRN    acetaminophen (TYLENOL) tablet 650 mg  650 mg Oral Q4H PRN    acetaminophen (TYLENOL) tablet 975 mg  975 mg Oral Q6H PRN    aluminum-magnesium hydroxide-simethicone (MAALOX) oral suspension 30 mL  30 mL Oral Q4H PRN    haloperidol lactate (HALDOL) injection 2.5 mg  2.5 mg Intramuscular Q4H PRN Max 4/day    And    LORazepam (ATIVAN) injection 1 mg  1 mg Intramuscular Q4H PRN Max 4/day    And    benztropine (COGENTIN) injection 0.5 mg  0.5 mg Intramuscular Q4H PRN Max 4/day    haloperidol lactate (HALDOL) injection 5 mg  5 mg Intramuscular Q4H PRN Max 4/day    And    LORazepam " (ATIVAN) injection 2 mg  2 mg Intramuscular Q4H PRN Max 4/day    And    benztropine (COGENTIN) injection 1 mg  1 mg Intramuscular Q4H PRN Max 4/day    benztropine (COGENTIN) tablet 0.5 mg  0.5 mg Oral BID    benztropine (COGENTIN) tablet 1 mg  1 mg Oral Q4H PRN Max 6/day    bisacodyl (DULCOLAX) rectal suppository 10 mg  10 mg Rectal Daily PRN    hydrOXYzine HCL (ATARAX) tablet 50 mg  50 mg Oral Q6H PRN Max 4/day    Or    diphenhydrAMINE (BENADRYL) injection 50 mg  50 mg Intramuscular Q6H PRN    diphenhydrAMINE-zinc acetate (BENADRYL) 2-0.1 % cream   Topical BID PRN    escitalopram (LEXAPRO) tablet 20 mg  20 mg Oral Daily    haloperidol (HALDOL) tablet 1 mg  1 mg Oral Q6H PRN    haloperidol (HALDOL) tablet 2.5 mg  2.5 mg Oral Q4H PRN Max 4/day    haloperidol (HALDOL) tablet 5 mg  5 mg Oral Q4H PRN Max 4/day    hydrocortisone 1 % cream   Topical BID    hydrOXYzine HCL (ATARAX) tablet 100 mg  100 mg Oral Q6H PRN Max 4/day    Or    LORazepam (ATIVAN) injection 2 mg  2 mg Intramuscular Q6H PRN    hydrOXYzine HCL (ATARAX) tablet 25 mg  25 mg Oral Q6H PRN Max 4/day    lithium carbonate (LITHOBID) CR tablet 1,200 mg  1,200 mg Oral HS    lithium carbonate (LITHOBID) CR tablet 300 mg  300 mg Oral Daily    loxapine (LOXITANE) capsule 40 mg  40 mg Oral HS    loxapine (LOXITANE) capsule 40 mg  40 mg Oral Daily    melatonin tablet 3 mg  3 mg Oral HS    mirtazapine (REMERON) tablet 45 mg  45 mg Oral HS    nicotine polacrilex (NICORETTE) gum 4 mg  4 mg Oral Q2H PRN    polyethylene glycol (MIRALAX) packet 17 g  17 g Oral Daily PRN    polyethylene glycol (MIRALAX) packet 17 g  17 g Oral Daily    senna-docusate sodium (SENOKOT S) 8.6-50 mg per tablet 1 tablet  1 tablet Oral Daily PRN    traZODone (DESYREL) tablet 50 mg  50 mg Oral HS PRN    white petrolatum-mineral oil (EUCERIN,HYDROCERIN) cream   Topical TID PRN       Labs:   No results displayed because visit has over 200 results.          Mental Status Evaluation:  Appearance:  age  appropriate and casually dressed   Behavior:  Cooperative, calm   Speech:  soft and scant   Mood:  dysthymic   Affect:  Constricted   Thought Process:  concrete   Thought Content:     Associations: No delusions voiced    [place]   Perceptual Disturbances: Positive auditory hallucinations, no command hallucinations, denies visual hallucinations   Risk Potential: Suicidal Ideations none, Homicidal Ideations none, and Potential for Aggression No   Sensorium:  person, place, and time/date   Cognition:  recent and remote memory grossly intact   Consciousness:  alert and awake    Attention: attention span appeared shorter than expected for age   Insight:  limited   Judgment: limited   Gait/Station: normal gait/station   Motor Activity: no abnormal movements     Progress Toward Goals: Continues to require inpatient treatment for ongoing hallucinations, gradually progressing    Assessment/Plan   Principal Problem:    Schizoaffective disorder, bipolar type (HCC)  Active Problems:    GERD (gastroesophageal reflux disease)    Medical clearance for psychiatric admission    Tobacco abuse    T wave inversion in EKG    Chronic idiopathic constipation    Vitamin B 12 deficiency    Vitamin D deficiency      Recommended Treatment:   Cogentin 0.5 mg twice daily  Lexapro 20 mg daily  Lithobid 1200 mg nightly  Lithium level 0.6 on 12/13/2023  Lithobid 300 mg every morning  Loxitane 40 mg every morning and 40 mg nightly  Melatonin 3 mg nightly  Mirtazapine 45 mg nightly  Continue with group therapy, milieu therapy and occupational therapy.      Risks, benefits and possible side effects of Medications:   Risks, benefits, and possible side effects of medications explained to patient and patient verbalizes understanding.        Counseling / Coordination of Care  Total floor / unit time spent today 25 minutes. Greater than 50% of total time was spent with the patient and / or family counseling and / or coordination of care. A description of  the counseling / coordination of care: Medication management, chart review, patient interview

## 2024-01-06 NOTE — NURSING NOTE
"Pt is calm and cooperative upon approach. Visible in the milieu socializing with peers, able to make needs known. Attended wrap up group. When asked \"How are the voices?\" Pt responded \"The same\". Denies SI, HI, and pain. Compliant with HS medication and snack. Denies unmet needs/ concerns at this time.   "

## 2024-01-07 PROCEDURE — 99232 SBSQ HOSP IP/OBS MODERATE 35: CPT | Performed by: PSYCHIATRY & NEUROLOGY

## 2024-01-07 RX ADMIN — LOXAPINE 40 MG: 25 CAPSULE ORAL at 21:18

## 2024-01-07 RX ADMIN — NICOTINE POLACRILEX 4 MG: 4 GUM, CHEWING ORAL at 18:24

## 2024-01-07 RX ADMIN — POLYETHYLENE GLYCOL 3350 17 G: 17 POWDER, FOR SOLUTION ORAL at 08:22

## 2024-01-07 RX ADMIN — MIRTAZAPINE 45 MG: 30 TABLET, FILM COATED ORAL at 21:18

## 2024-01-07 RX ADMIN — LOXAPINE 40 MG: 25 CAPSULE ORAL at 08:22

## 2024-01-07 RX ADMIN — BENZTROPINE MESYLATE 0.5 MG: 0.5 TABLET ORAL at 08:23

## 2024-01-07 RX ADMIN — NICOTINE POLACRILEX 4 MG: 4 GUM, CHEWING ORAL at 20:50

## 2024-01-07 RX ADMIN — TRAZODONE HYDROCHLORIDE 50 MG: 50 TABLET ORAL at 21:18

## 2024-01-07 RX ADMIN — MELATONIN TAB 3 MG 3 MG: 3 TAB at 21:18

## 2024-01-07 RX ADMIN — BENZTROPINE MESYLATE 0.5 MG: 0.5 TABLET ORAL at 17:19

## 2024-01-07 RX ADMIN — NICOTINE POLACRILEX 4 MG: 4 GUM, CHEWING ORAL at 13:25

## 2024-01-07 RX ADMIN — ESCITALOPRAM OXALATE 20 MG: 10 TABLET ORAL at 08:23

## 2024-01-07 RX ADMIN — LITHIUM CARBONATE 300 MG: 300 TABLET, EXTENDED RELEASE ORAL at 08:23

## 2024-01-07 RX ADMIN — NICOTINE POLACRILEX 4 MG: 4 GUM, CHEWING ORAL at 09:05

## 2024-01-07 RX ADMIN — LITHIUM CARBONATE 1200 MG: 450 TABLET, EXTENDED RELEASE ORAL at 21:19

## 2024-01-07 NOTE — NURSING NOTE
Pt observed in milieu and dayroom. Pt is social with peers, pleasant and cooperative with staff. Pt is wearing clothing from home. Pt denies SI/HI/VH, continues to have AH which is manageable. Pt denies pain. Medication and meal compliant. Pt attended groups. No needs expressed this shift.

## 2024-01-07 NOTE — NURSING NOTE
Pt is calm and cooperative upon approach. Social and visible, bright affect noted. Continues to report ongoing AH that are manageable. Denies SI, HI, VH, and pain. Compliant with HS medication and snack. Denies unmet needs/ concerns at this time.

## 2024-01-07 NOTE — PROGRESS NOTES
"Progress Note - Behavioral Health   Alberto Berumen 28 y.o. male MRN: [id number]  Unit/Bed#: -02 Encounter: [id number]    Alberto was seen for follow-up, chart reviewed and discussed with nursing staff.  Nursing staff notes no significant change over the past 24 hours.  Has been compliant with medications and treatment plan.  Visible in the milieu and social with select peers.  Sleeping and eating adequately.    Patient is calm and cooperative on approach.  Reports that he continues with ongoing auditory hallucinations but they are \"manageable\".  Denies command hallucinations, no visual hallucinations.  Denies suicidal or homicidal ideation.  Slightly more animated today and smiling appropriately at times.  Discussed plans for after discharge and states that he is looking forward to going back to the gym and working on his music.  Physically he denies any pain or discomfort.    Behavior over the last 24 hours:  unchanged  Sleep: normal  Appetite: normal  Medication side effects: No  ROS: no complaints  /86 (BP Location: Right arm)   Pulse 100   Temp (!) 95.6 °F (35.3 °C) (Temporal)   Resp 16   Ht 5' 7\" (1.702 m)   Wt 116 kg (255 lb 9.6 oz)   SpO2 100%   BMI 40.03 kg/m²     Medications:   Current Facility-Administered Medications   Medication Dose Route Frequency    acetaminophen (TYLENOL) tablet 650 mg  650 mg Oral Q6H PRN    acetaminophen (TYLENOL) tablet 650 mg  650 mg Oral Q4H PRN    acetaminophen (TYLENOL) tablet 975 mg  975 mg Oral Q6H PRN    aluminum-magnesium hydroxide-simethicone (MAALOX) oral suspension 30 mL  30 mL Oral Q4H PRN    haloperidol lactate (HALDOL) injection 2.5 mg  2.5 mg Intramuscular Q4H PRN Max 4/day    And    LORazepam (ATIVAN) injection 1 mg  1 mg Intramuscular Q4H PRN Max 4/day    And    benztropine (COGENTIN) injection 0.5 mg  0.5 mg Intramuscular Q4H PRN Max 4/day    haloperidol lactate (HALDOL) injection 5 mg  5 mg Intramuscular Q4H PRN Max 4/day    And    LORazepam " (ATIVAN) injection 2 mg  2 mg Intramuscular Q4H PRN Max 4/day    And    benztropine (COGENTIN) injection 1 mg  1 mg Intramuscular Q4H PRN Max 4/day    benztropine (COGENTIN) tablet 0.5 mg  0.5 mg Oral BID    benztropine (COGENTIN) tablet 1 mg  1 mg Oral Q4H PRN Max 6/day    bisacodyl (DULCOLAX) rectal suppository 10 mg  10 mg Rectal Daily PRN    hydrOXYzine HCL (ATARAX) tablet 50 mg  50 mg Oral Q6H PRN Max 4/day    Or    diphenhydrAMINE (BENADRYL) injection 50 mg  50 mg Intramuscular Q6H PRN    diphenhydrAMINE-zinc acetate (BENADRYL) 2-0.1 % cream   Topical BID PRN    escitalopram (LEXAPRO) tablet 20 mg  20 mg Oral Daily    haloperidol (HALDOL) tablet 1 mg  1 mg Oral Q6H PRN    haloperidol (HALDOL) tablet 2.5 mg  2.5 mg Oral Q4H PRN Max 4/day    haloperidol (HALDOL) tablet 5 mg  5 mg Oral Q4H PRN Max 4/day    hydrOXYzine HCL (ATARAX) tablet 100 mg  100 mg Oral Q6H PRN Max 4/day    Or    LORazepam (ATIVAN) injection 2 mg  2 mg Intramuscular Q6H PRN    hydrOXYzine HCL (ATARAX) tablet 25 mg  25 mg Oral Q6H PRN Max 4/day    lithium carbonate (LITHOBID) CR tablet 1,200 mg  1,200 mg Oral HS    lithium carbonate (LITHOBID) CR tablet 300 mg  300 mg Oral Daily    loxapine (LOXITANE) capsule 40 mg  40 mg Oral HS    loxapine (LOXITANE) capsule 40 mg  40 mg Oral Daily    melatonin tablet 3 mg  3 mg Oral HS    mirtazapine (REMERON) tablet 45 mg  45 mg Oral HS    nicotine polacrilex (NICORETTE) gum 4 mg  4 mg Oral Q2H PRN    polyethylene glycol (MIRALAX) packet 17 g  17 g Oral Daily PRN    polyethylene glycol (MIRALAX) packet 17 g  17 g Oral Daily    senna-docusate sodium (SENOKOT S) 8.6-50 mg per tablet 1 tablet  1 tablet Oral Daily PRN    traZODone (DESYREL) tablet 50 mg  50 mg Oral HS PRN    white petrolatum-mineral oil (EUCERIN,HYDROCERIN) cream   Topical TID PRN       Labs:   No results displayed because visit has over 200 results.          Mental Status Evaluation:  Appearance:  age appropriate and casually dressed    Behavior:  Calm, cooperative, good eye contact   Speech:  normal pitch and normal volume   Mood:  Less anxious, less depressed   Affect:  mood-congruent   Thought Process:  goal directed   Thought Content:     Associations: No delusions voiced    Intact   Perceptual Disturbances: Auditory hallucinations without commands   Risk Potential: Suicidal Ideations none, Homicidal Ideations none, and Potential for Aggression No   Sensorium:  person, place, and time/date   Cognition:  recent and remote memory grossly intact   Consciousness:  alert and awake    Attention: attention span appeared shorter than expected for age   Insight:  limited   Judgment: limited   Gait/Station: normal gait/station   Motor Activity: no abnormal movements     Progress Toward Goals: Gradually progressing    Assessment/Plan   Principal Problem:    Schizoaffective disorder, bipolar type ([facility])  Active Problems:    GERD (gastroesophageal reflux disease)    Medical clearance for psychiatric admission    Tobacco abuse    T wave inversion in EKG    Chronic idiopathic constipation    Vitamin B 12 deficiency    Vitamin D deficiency      Recommended Treatment:  Cogentin 0.5 mg twice daily  Lexapro 20 mg daily  Lithobid 1200 mg nightly and 300 mg every morning  Lithium level 0.6 on 12/13/23  Loxitane 40 mg every morning and 40 mg nightly  Melatonin 3 mg daily  Mirtazapine 45 mg nightly    Continue with group therapy, milieu therapy and occupational therapy.      Risks, benefits and possible side effects of Medications:   Risks, benefits, and possible side effects of medications explained to patient and patient verbalizes understanding.        Counseling / Coordination of Care  Total floor / unit time spent today 25 minutes. Greater than 50% of total time was spent with the patient and / or family counseling and / or coordination of care. A description of the counseling / coordination of care: Medication management, chart review, patient interview

## 2024-01-08 PROCEDURE — 99232 SBSQ HOSP IP/OBS MODERATE 35: CPT | Performed by: PSYCHIATRY & NEUROLOGY

## 2024-01-08 RX ORDER — LOXAPINE SUCCINATE 50 MG/1
50 TABLET ORAL
Status: DISCONTINUED | OUTPATIENT
Start: 2024-01-08 | End: 2024-01-10

## 2024-01-08 RX ORDER — LOXAPINE SUCCINATE 50 MG/1
50 TABLET ORAL DAILY
Status: DISCONTINUED | OUTPATIENT
Start: 2024-01-09 | End: 2024-01-10

## 2024-01-08 RX ADMIN — NICOTINE POLACRILEX 4 MG: 4 GUM, CHEWING ORAL at 08:48

## 2024-01-08 RX ADMIN — LOXAPINE 40 MG: 25 CAPSULE ORAL at 08:33

## 2024-01-08 RX ADMIN — BENZTROPINE MESYLATE 0.5 MG: 0.5 TABLET ORAL at 08:33

## 2024-01-08 RX ADMIN — LITHIUM CARBONATE 300 MG: 300 TABLET, EXTENDED RELEASE ORAL at 08:34

## 2024-01-08 RX ADMIN — MIRTAZAPINE 45 MG: 30 TABLET, FILM COATED ORAL at 21:24

## 2024-01-08 RX ADMIN — BENZTROPINE MESYLATE 0.5 MG: 0.5 TABLET ORAL at 17:20

## 2024-01-08 RX ADMIN — POLYETHYLENE GLYCOL 3350 17 G: 17 POWDER, FOR SOLUTION ORAL at 08:33

## 2024-01-08 RX ADMIN — LITHIUM CARBONATE 1200 MG: 450 TABLET, EXTENDED RELEASE ORAL at 21:24

## 2024-01-08 RX ADMIN — NICOTINE POLACRILEX 4 MG: 4 GUM, CHEWING ORAL at 13:44

## 2024-01-08 RX ADMIN — NICOTINE POLACRILEX 4 MG: 4 GUM, CHEWING ORAL at 18:12

## 2024-01-08 RX ADMIN — ESCITALOPRAM OXALATE 20 MG: 10 TABLET ORAL at 08:33

## 2024-01-08 RX ADMIN — LOXAPINE 50 MG: 50 CAPSULE ORAL at 21:24

## 2024-01-08 RX ADMIN — MELATONIN TAB 3 MG 3 MG: 3 TAB at 21:24

## 2024-01-08 NOTE — NURSING NOTE
Patient is in the community and social with peers. Patient calm and cooperative. Patient endorses AH and frustration that they never go away despite being on medications. Patient denied SI/HI. Patient is compliant with scheduled medications. PRN trazodone 50mg was administered at 2118 for difficulty sleeping and effective on reassessment. Staff maintained continuous rounding for safety and support.

## 2024-01-08 NOTE — PROGRESS NOTES
01/08/24 0816   Team Meeting   Meeting Type Daily Rounds   Team Members Present   Team Members Present Physician;Nurse;   Physician Team Member Jose Luis Flores   Nursing Team Member Gary   Care Management Team Member Rabia   Patient/Family Present   Patient Present No   Patient's Family Present No     Pt visible and social with peers. Reports auditory hallucinations less intense. Compliant with meds and meals. Consider increasing Loxapine to 50 mg BID. Follow up with MA application. Continue to monitor. Discharge to be determined.

## 2024-01-08 NOTE — PROGRESS NOTES
"Progress Note - Behavioral Health     Alberto Berumen 28 y.o. male MRN: [id number]   Unit/Bed#: -02 Encounter: [id number]    Behavior over the last 24 hours: limited improvement.     Alberto continues to report auditory hallucinations of \"voices saying that they will kill me and my family and that I am going to hell\". He feels frustrated with limited progress \"voices are only a little bit better\". Blunted affect, limited eye contact. Has been taking medications. Attends some groups.    Sleep: normal  Appetite: normal  Medication side effects: No   ROS: no complaints, denies any shortness of breath, chest pain, or abdominal pain, all other systems are negative    Mental Status Evaluation:    Appearance:  casually dressed   Behavior:  cooperative, limited eye contact   Speech:  scant, soft   Mood:  dysphoric   Affect:  blunted   Thought Process:  concrete   Associations: concrete associations   Thought Content:  paranoid ideation   Perceptual Disturbances: auditory hallucinations of \"voices saying that they will kill me and my family and that I am going to hell\", no visual hallucinations   Risk Potential: Suicidal ideation - None at present  Homicidal ideation - None at present  Potential for aggression - No   Sensorium:  oriented to person, place, and time/date   Memory:  recent and remote memory grossly intact   Consciousness:  alert and awake   Attention/Concentration: poor concentration and poor attention span   Insight:  impaired   Judgment: impaired   Gait/Station: normal gait/station, normal balance   Motor Activity: no abnormal movements     Vital signs in last 24 hours:    Temp:  [98.4 °F (36.9 °C)] 98.4 °F (36.9 °C)  HR:  [108] 108  Resp:  [16] 16  BP: (143)/(73) 143/73    Laboratory results: I have personally reviewed all pertinent laboratory/tests results    ECG   Lab Results   Component Value Date    VENTRATE 94 01/05/2024    ATRIALRATE 94 01/05/2024    PRINT 152 01/05/2024    QRSDINT 88 01/05/2024 "    QTINT 358 01/05/2024    QTCINT 447 01/05/2024    PAXIS 38 01/05/2024    QRSAXIS 37 01/05/2024    TWAVEAXIS -7 01/05/2024       Suicide/Homicide Risk Assessment:    Risk of Harm to Self:   Nursing Suicide Risk Assessment Last 24 hours: C-SSRS Risk (Since Last Contact)  Calculated C-SSRS Risk Score (Since Last Contact): No Risk Indicated  Current Specific Risk Factors include: mental illness diagnosis, current psychotic symptoms  Protective Factors: no current suicidal ideation, ability to communicate with staff on the unit, taking medications as ordered on the unit  Based on today's assessment, Alberto presents the following risk of harm to self: low    Risk of Harm to Others:  Nursing Homicide Risk Assessment: Violence Risk to Others: Denies within past 6 months  Based on today's assessment, Alberto presents the following risk of harm to others: low    The following interventions are recommended: behavioral checks every 7 minutes, continued hospitalization on locked unit    Progress Toward Goals: limited improvement, denies current suicidal thoughts, still reports psychotic symptoms    Assessment/Plan   Principal Problem:    Schizoaffective disorder, bipolar type ([facility])  Active Problems:    GERD (gastroesophageal reflux disease)    Medical clearance for psychiatric admission    Tobacco abuse    T wave inversion in EKG    Chronic idiopathic constipation    Vitamin B 12 deficiency    Vitamin D deficiency      Recommended Treatment:     Planned medication and treatment changes:    All current active medications have been reviewed  Encourage group therapy, milieu therapy and occupational therapy  Behavioral Health checks every 7 minutes  Insurance benefit transfer to [place] still pending  Increase Loxapine to 50 mg bid to help with psychotic symptoms    Continue all other medications:    Current Facility-Administered Medications   Medication Dose Route Frequency Provider Last Rate    acetaminophen  650 mg Oral  Q6H PRN Yasmin Harvey MD      acetaminophen  650 mg Oral Q4H PRN Yasmin Harvey MD      acetaminophen  975 mg Oral Q6H PRN Yasmin Harvey MD      aluminum-magnesium hydroxide-simethicone  30 mL Oral Q4H PRN Yasmin Harvey MD      haloperidol lactate  2.5 mg Intramuscular Q4H PRN Max 4/day Yasmin Harvey MD      And    LORazepam  1 mg Intramuscular Q4H PRN Max 4/day Yasmin Harvey MD      And    benztropine  0.5 mg Intramuscular Q4H PRN Max 4/day Yasmin Harvey MD      haloperidol lactate  5 mg Intramuscular Q4H PRN Max 4/day Yasmin Harvey MD      And    LORazepam  2 mg Intramuscular Q4H PRN Max 4/day Yasmin Harvey MD      And    benztropine  1 mg Intramuscular Q4H PRN Max 4/day Yasmin Harvey MD      benztropine  0.5 mg Oral BID La Flores MD      benztropine  1 mg Oral Q4H PRN Max 6/day Yasmin Harvey MD      bisacodyl  10 mg Rectal Daily PRN Yasmin Harvey MD      hydrOXYzine HCL  50 mg Oral Q6H PRN Max 4/day Yasmin Harvey MD      Or    diphenhydrAMINE  50 mg Intramuscular Q6H PRN Yasmin Harvey MD      diphenhydrAMINE-zinc acetate   Topical BID PRN Anjel Sagastume MD      escitalopram  20 mg Oral Daily La Flores MD      haloperidol  1 mg Oral Q6H PRN Yasmin Harvey MD      haloperidol  2.5 mg Oral Q4H PRN Max 4/day Yasmin Harvey MD      haloperidol  5 mg Oral Q4H PRN Max 4/day Yasmin Harvey MD      hydrOXYzine HCL  100 mg Oral Q6H PRN Max 4/day Yasmin Harvey MD      Or    LORazepam  2 mg Intramuscular Q6H PRN Yasmin Harvey MD      hydrOXYzine HCL  25 mg Oral Q6H PRN Max 4/day Yasmin Harvey MD      lithium carbonate  1,200 mg Oral HS HOLLI Anderson      lithium carbonate  300 mg Oral Daily HOLLI Anderson      loxapine  50 mg Oral HS La Flores MD      [START ON 1/9/2024] loxapine  50 mg Oral Daily La Flores MD      melatonin  3 mg Oral HS Yasmin Harvey MD      mirtazapine  45 mg Oral HS La  MD Mark      nicotine polacrilex  4 mg Oral Q2H PRN Yasmin Harvey MD      polyethylene glycol  17 g Oral Daily PRN Yasmin Harvey MD      polyethylene glycol  17 g Oral Daily HOLLI Monge-docusate sodium  1 tablet Oral Daily PRN Yasmin Harvey MD      traZODone  50 mg Oral HS PRN Yasmin Harvey MD      white petrolatum-mineral oil   Topical TID PRN Anjel Sagastume MD       Risks / Benefits of Treatment:    Risks, benefits, and possible side effects of medications explained to patient. Patient has limited understanding of risks and benefits of treatment at this time, but agrees to take medications as prescribed.    Counseling / Coordination of Care:    Patient's progress discussed with staff in treatment team meeting.  Medications, treatment progress and treatment plan reviewed with patient.  Medication changes discussed with patient.    La Flores MD 01/08/24

## 2024-01-08 NOTE — NURSING NOTE
Pt observed in milieu and dayroom. Pt is social with peers, cooperative and pleasant with staff. Pt is wearing clothing from home. Medication and meal compliant. Pt denies SI/HI/VH, but still having AH. Pt denies pain. No needs expressed this shift.

## 2024-01-08 NOTE — CASE MANAGEMENT
SHANIQUA Muñoz from [facility]/DS called in advising that she check with the [facility] Office of Assistance and pt's MA is not active. Wanda requested to be involved in MA follow up and discharge planning at [phone].

## 2024-01-09 LAB — LITHIUM SERPL-SCNC: 0.61 MMOL/L (ref 0.6–1.2)

## 2024-01-09 PROCEDURE — 80178 ASSAY OF LITHIUM: CPT | Performed by: PSYCHIATRY & NEUROLOGY

## 2024-01-09 PROCEDURE — 99232 SBSQ HOSP IP/OBS MODERATE 35: CPT | Performed by: PSYCHIATRY & NEUROLOGY

## 2024-01-09 RX ADMIN — NICOTINE POLACRILEX 4 MG: 4 GUM, CHEWING ORAL at 13:21

## 2024-01-09 RX ADMIN — NICOTINE POLACRILEX 4 MG: 4 GUM, CHEWING ORAL at 17:57

## 2024-01-09 RX ADMIN — POLYETHYLENE GLYCOL 3350 17 G: 17 POWDER, FOR SOLUTION ORAL at 08:02

## 2024-01-09 RX ADMIN — ESCITALOPRAM OXALATE 20 MG: 10 TABLET ORAL at 08:02

## 2024-01-09 RX ADMIN — LOXAPINE 50 MG: 50 CAPSULE ORAL at 21:45

## 2024-01-09 RX ADMIN — LITHIUM CARBONATE 1200 MG: 450 TABLET, EXTENDED RELEASE ORAL at 21:48

## 2024-01-09 RX ADMIN — NICOTINE POLACRILEX 4 MG: 4 GUM, CHEWING ORAL at 21:29

## 2024-01-09 RX ADMIN — LOXAPINE 50 MG: 50 CAPSULE ORAL at 08:02

## 2024-01-09 RX ADMIN — NICOTINE POLACRILEX 4 MG: 4 GUM, CHEWING ORAL at 08:48

## 2024-01-09 RX ADMIN — LITHIUM CARBONATE 300 MG: 300 TABLET, EXTENDED RELEASE ORAL at 08:02

## 2024-01-09 RX ADMIN — MELATONIN TAB 3 MG 3 MG: 3 TAB at 21:47

## 2024-01-09 RX ADMIN — BENZTROPINE MESYLATE 0.5 MG: 0.5 TABLET ORAL at 08:02

## 2024-01-09 RX ADMIN — BENZTROPINE MESYLATE 0.5 MG: 0.5 TABLET ORAL at 17:53

## 2024-01-09 RX ADMIN — MIRTAZAPINE 45 MG: 30 TABLET, FILM COATED ORAL at 21:47

## 2024-01-09 NOTE — NURSING NOTE
Pt awake and alert, visible on the unit and appropriate with staff and peers. Pt denies SI/HI/VH at this time. Pt continues to report AH of negative voices which he reports have been ongoing and manageable at this time. Pt compliant with scheduled medications and meals, appetite good. Encouraged to attend groups. Pt denies any unmet needs at this time. Remains in behavioral control. Q7 minute safety checks maintained.

## 2024-01-09 NOTE — PROGRESS NOTES
"   01/09/24 0816   Team Meeting   Meeting Type Daily Rounds   Team Members Present   Team Members Present Physician;Nurse;   Physician Team Member Jose Luis / Mark   Nursing Team Member Earl   Care Management Team Member Rabia   Patient/Family Present   Patient Present No   Patient's Family Present No     Reports auditory hallucination -voices saying \"they will kill me and my family\". Otherwise, pt often seen on the unit, social, and cooperative. Denies symptoms. Meds -continue to titrate Loxitane and recheck Lithium lvl tomorrow morning. Follow up with MA application. Discharge to be determined.   "

## 2024-01-09 NOTE — NURSING NOTE
Pt is calm and cooperative. Visible in the milieu, social with staff and peers. Denies SI, HI, VH and pain. Pt reports ongoing AH that are manageable. Compliant with HS medication and snack. Denies unmet needs/ concerns at this time.

## 2024-01-09 NOTE — PROGRESS NOTES
"Progress Note - Behavioral Health     Alberto Berumen 28 y.o. male MRN: [id number]   Unit/Bed#: U 383-02 Encounter: [id number]    Behavior over the last 24 hours: unchanged.     Alberto continues to report auditory hallucinations of \"voices saying that they will hurt me and my family and that I will go to hell. They are still there\". He also reports some depression and rates his mood as 6 on a scale of 1 (best mood) to 10 (worst mood) today. He denies current suicidal or homicidal thoughts. Blunted affect, limited eye contact, frustrated with lact of progress. Compliant with medications. Attends some groups.    Sleep: normal  Appetite: normal  Medication side effects: No   ROS: no complaints, denies any shortness of breath, chest pain, or abdominal pain, all other systems are negative    Mental Status Evaluation:    Appearance:  casually dressed   Behavior:  cooperative, poor eye contact   Speech:  scant, soft   Mood:  depressed   Affect:  blunted   Thought Process:  concrete   Associations: concrete associations   Thought Content:  paranoid ideation   Perceptual Disturbances: auditory hallucinations of \"voices saying that they will hurt me and my family and that I will go to hell\", no visual hallucinations   Risk Potential: Suicidal ideation - None at present  Homicidal ideation - None at present  Potential for aggression - No   Sensorium:  oriented to person, place, and time/date   Memory:  recent and remote memory grossly intact   Consciousness:  alert and awake   Attention/Concentration: decreased concentration and decreased attention span   Insight:  impaired   Judgment: impaired   Gait/Station: normal gait/station, normal balance   Motor Activity: no abnormal movements     Vital signs in last 24 hours:    Temp:  [98.1 °F (36.7 °C)-98.5 °F (36.9 °C)] 98.5 °F (36.9 °C)  HR:  [] 99  Resp:  [16] 16  BP: (125-138)/(85-86) 138/85    Laboratory results: I have personally reviewed all pertinent " laboratory/tests results    Results from the past 24 hours: No results found for this or any previous visit (from the past 24 hour(s)).    Suicide/Homicide Risk Assessment:    Risk of Harm to Self:   Nursing Suicide Risk Assessment Last 24 hours: C-SSRS Risk (Since Last Contact)  Calculated C-SSRS Risk Score (Since Last Contact): No Risk Indicated  Current Specific Risk Factors include: mental illness diagnosis, current depressive symptoms, current psychotic symptoms, presence of hallucinations  Protective Factors: no current suicidal ideation, ability to communicate with staff on the unit, taking medications as ordered on the unit  Based on today's assessment, Alberto presents the following risk of harm to self: low    Risk of Harm to Others:  Nursing Homicide Risk Assessment: Violence Risk to Others: Denies within past 6 months  Based on today's assessment, Alberto presents the following risk of harm to others: low    The following interventions are recommended: behavioral checks every 7 minutes, continued hospitalization on locked unit    Progress Toward Goals: no significant progress, continues to feel depressed, denies suicidal thoughts, still reports psychotic symptoms    Assessment/Plan   Principal Problem:    Schizoaffective disorder, bipolar type ([facility])  Active Problems:    GERD (gastroesophageal reflux disease)    Medical clearance for psychiatric admission    Tobacco abuse    T wave inversion in EKG    Chronic idiopathic constipation    Vitamin B 12 deficiency    Vitamin D deficiency      Recommended Treatment:     Planned medication and treatment changes:    All current active medications have been reviewed  Encourage group therapy, milieu therapy and occupational therapy  Behavioral Health checks every 7 minutes  Await insurance benefit transfer to [place]  Titrate Loxapine gradually to help with psychotic symptoms  Recheck Lithium level today and recheck CBC/diff, CMP and lipid profile in the  morning    Continue all other medications:    Current Facility-Administered Medications   Medication Dose Route Frequency Provider Last Rate    acetaminophen  650 mg Oral Q6H PRN Yasmin Harvey MD      acetaminophen  650 mg Oral Q4H PRN Yasmin Harvey MD      acetaminophen  975 mg Oral Q6H PRN Yasmin Harvey MD      aluminum-magnesium hydroxide-simethicone  30 mL Oral Q4H PRN Yasmin Harvey MD      haloperidol lactate  2.5 mg Intramuscular Q4H PRN Max 4/day Yasmin Harvey MD      And    LORazepam  1 mg Intramuscular Q4H PRN Max 4/day Yasmin Harvey MD      And    benztropine  0.5 mg Intramuscular Q4H PRN Max 4/day Yasmin Harvey MD      haloperidol lactate  5 mg Intramuscular Q4H PRN Max 4/day Yasmin Harvey MD      And    LORazepam  2 mg Intramuscular Q4H PRN Max 4/day Yasmin Harvey MD      And    benztropine  1 mg Intramuscular Q4H PRN Max 4/day Yasmin Harvey MD      benztropine  0.5 mg Oral BID La Flores MD      benztropine  1 mg Oral Q4H PRN Max 6/day Yasmin Harvey MD      bisacodyl  10 mg Rectal Daily PRN Yasmin Harvey MD      hydrOXYzine HCL  50 mg Oral Q6H PRN Max 4/day Yasmin Harvey MD      Or    diphenhydrAMINE  50 mg Intramuscular Q6H PRN Yasmin Harvey MD      diphenhydrAMINE-zinc acetate   Topical BID PRN Anjel Sagastume MD      escitalopram  20 mg Oral Daily La Flores MD      haloperidol  1 mg Oral Q6H PRN Yasmin Harvey MD      haloperidol  2.5 mg Oral Q4H PRN Max 4/day Yasmin Harvey MD      haloperidol  5 mg Oral Q4H PRN Max 4/day Yasmin Harvey MD      hydrOXYzine HCL  100 mg Oral Q6H PRN Max 4/day Yasmin Harvey MD      Or    LORazepam  2 mg Intramuscular Q6H PRN Yasmin Harvey MD      hydrOXYzine HCL  25 mg Oral Q6H PRN Max 4/day Yasmin Harvey MD      lithium carbonate  1,200 mg Oral HS HOLLI Anderson      lithium carbonate  300 mg Oral Daily HOLLI Anderson      loxapine  50 mg Oral HS La Flores MD       loxapine  50 mg Oral Daily La Flores MD      melatonin  3 mg Oral HS Yasmin Harvey MD      mirtazapine  45 mg Oral HS La Flores MD      nicotine polacrilex  4 mg Oral Q2H PRN Yasmin Harvey MD      polyethylene glycol  17 g Oral Daily PRN Yasmin Harvey MD      polyethylene glycol  17 g Oral Daily HOLLI Monge      senna-docusate sodium  1 tablet Oral Daily PRN Yasmin Harvey MD      traZODone  50 mg Oral HS PRN Yasmin Harvey MD      white petrolatum-mineral oil   Topical TID PRN Anjel Sagastume MD       Risks / Benefits of Treatment:    Risks, benefits, and possible side effects of medications explained to patient. Patient has limited understanding of risks and benefits of treatment at this time, but agrees to take medications as prescribed.    Counseling / Coordination of Care:    Patient's progress discussed with staff in treatment team meeting.  Medications, treatment progress and treatment plan reviewed with patient.  Medication education provided to patient.    La Flores MD 01/09/24

## 2024-01-10 PROCEDURE — 99232 SBSQ HOSP IP/OBS MODERATE 35: CPT | Performed by: PSYCHIATRY & NEUROLOGY

## 2024-01-10 RX ADMIN — ESCITALOPRAM OXALATE 20 MG: 10 TABLET ORAL at 08:38

## 2024-01-10 RX ADMIN — MIRTAZAPINE 45 MG: 30 TABLET, FILM COATED ORAL at 21:26

## 2024-01-10 RX ADMIN — BENZTROPINE MESYLATE 0.5 MG: 0.5 TABLET ORAL at 08:38

## 2024-01-10 RX ADMIN — MELATONIN TAB 3 MG 3 MG: 3 TAB at 21:26

## 2024-01-10 RX ADMIN — LOXAPINE 50 MG: 50 CAPSULE ORAL at 08:38

## 2024-01-10 RX ADMIN — NICOTINE POLACRILEX 4 MG: 4 GUM, CHEWING ORAL at 09:00

## 2024-01-10 RX ADMIN — BENZTROPINE MESYLATE 0.5 MG: 0.5 TABLET ORAL at 17:47

## 2024-01-10 RX ADMIN — NICOTINE POLACRILEX 4 MG: 4 GUM, CHEWING ORAL at 18:33

## 2024-01-10 RX ADMIN — LITHIUM CARBONATE 300 MG: 300 TABLET, EXTENDED RELEASE ORAL at 08:38

## 2024-01-10 RX ADMIN — NICOTINE POLACRILEX 4 MG: 4 GUM, CHEWING ORAL at 15:36

## 2024-01-10 RX ADMIN — POLYETHYLENE GLYCOL 3350 17 G: 17 POWDER, FOR SOLUTION ORAL at 08:37

## 2024-01-10 RX ADMIN — NICOTINE POLACRILEX 4 MG: 4 GUM, CHEWING ORAL at 13:34

## 2024-01-10 RX ADMIN — LITHIUM CARBONATE 1200 MG: 450 TABLET, EXTENDED RELEASE ORAL at 21:26

## 2024-01-10 RX ADMIN — LOXAPINE 60 MG: 50 CAPSULE ORAL at 21:26

## 2024-01-10 NOTE — NURSING NOTE
"Pt is calm and cooperative upon approach. Visible in the milieu, social with peers. Denies SI, HI, visual hallucinations, and pain. Reports ongoing AH of voices \"telling me to kill myself\". Pt denies any plan or intent to act. Pt remains frustrated with medication not relieving symptoms. Pt appears hopeful, states \"One day I hope I find the right medication\". Lithium level at 2019 was 0.61. Complaint with HS medication and snack. Denies further unmet needs/ concerns at this time.   "

## 2024-01-10 NOTE — NURSING NOTE
"Pt declined AM blood work. Pt stated \"They just got labs last night\" and went back to resting in bed.   "

## 2024-01-10 NOTE — PROGRESS NOTES
Progress Note - Behavioral Health     Alberto Berumen 28 y.o. male MRN: [id number]   Unit/Bed#: [facility] 383-02 Encounter: [id number]    Documentation, nursing notes, medication reconciliation, labs, and vitals reviewed. Patient was seen for continuing care and reviewed with treatment team. No acute events over the past 24 hours.  Per nursing report, still isolative to his room, pleasant cooperative.  Reported ongoing auditory hallucinations saying they will hurt him or his family.    Medication changes over the past 24 hours. Continues to tolerate current medications with no adverse effects. On evaluation today, he is seen in his room today.  Resting in bed with covers over his head.  Continues to report present flat and depressed.  I have not seen him in a couple of weeks.  He tells me regarding his depression he feels no better.  Also tells me regarding hallucinations he feels little improvement overall since admission, overall compared to all medications he is tried during this admission.  He is agreeable to increase in his Loxitane.  No suicidal ideation, plan, or intent. Ongoing perceptual disturbances, hallucinations unchanged, and does not exhibit any symptoms of aimee on evaluation.    Psychiatric ROS:  Behavior over the last 24 hours: unchanged  Sleep: slept off and on  Appetite: fair  Medication side effects: No   ROS: no complaints, all other systems are negative    Mental Status Evaluation:    Appearance:  marginal hygiene, dressed in hospital attire   Behavior:  cooperative, psychomotor retardation   Speech:  slow, soft   Mood:  depressed   Affect:  constricted   Thought Process:  coherent   Associations: concrete associations   Thought Content:  some paranoia   Perceptual Disturbances: auditory hallucinations   Risk Potential: Suicidal ideation - None  Homicidal ideation - None  Potential for aggression - No   Sensorium:  oriented to person, place, and time/date   Memory:  recent and remote memory grossly  intact   Consciousness:  alert and awake   Attention/Concentration: poor concentration and poor attention span   Insight:  limited   Judgment: limited   Gait/Station: normal gait/station, normal balance   Motor Activity: no abnormal movements     Vital signs in last 24 hours:    Temp:  [97.2 °F (36.2 °C)-98.8 °F (37.1 °C)] 97.2 °F (36.2 °C)  HR:  [] 94  Resp:  [16-18] 18  BP: (143)/(87-91) 143/87    Laboratory results: I have personally reviewed all pertinent laboratory/tests results    Results from the past 24 hours:   Recent Results (from the past 24 hour(s))   Lithium level    Collection Time: 01/09/24  8:19 PM   Result Value Ref Range    Lithium Lvl 0.61 0.60 - 1.20 mmol/L       Suicide/Homicide Risk Assessment:    Risk of Harm to Self:   Current Specific Risk Factors include: mental illness diagnosis, current psychotic symptoms  Protective Factors: no current suicidal ideation, ability to communicate with staff on the unit, able to contract for safety on the unit, taking medications as ordered on the unit  Based on today's assessment, Alberto presents the following risk of harm to self: minimal    Risk of Harm to Others:  Current Specific Risk Factors include: current psychotic symptoms  Protective Factors: no current homicidal ideation, able to communicate with staff on the unit, improved impulse control, compliant with medications on the unit as ordered  Based on today's assessment, Alberto presents the following risk of harm to others: minimal    The following interventions are recommended: behavioral checks every 7 minutes, continued hospitalization on locked unit    Progress Toward Goals: no significant improvement, still psychotic    Assessment/Plan   Principal Problem:    Schizoaffective disorder, bipolar type (HCC)  Active Problems:    GERD (gastroesophageal reflux disease)    Medical clearance for psychiatric admission    Tobacco abuse    T wave inversion in EKG    Chronic idiopathic  constipation    Vitamin B 12 deficiency    Vitamin D deficiency      Recommended Treatment:     Planned medication and treatment changes:    All current active medications have been reviewed  Encourage group therapy, milieu therapy and occupational therapy  Behavioral Health checks every 7 minutes  Awaiting approval for [place] [employer] [facility] [place].  Hopeful for Clozaril at that time.  Today again asking about ECT also once insurance approved.      Increase Loxitane to 60 mg twice daily    Continue all other psychiatric medications the same    Current Facility-Administered Medications   Medication Dose Route Frequency Provider Last Rate    acetaminophen  650 mg Oral Q6H PRN Yasmin Harvey MD      acetaminophen  650 mg Oral Q4H PRN Yasmin Harvey MD      acetaminophen  975 mg Oral Q6H PRN Yasmin Harvey MD      aluminum-magnesium hydroxide-simethicone  30 mL Oral Q4H PRN Yasmin Harvey MD      haloperidol lactate  2.5 mg Intramuscular Q4H PRN Max 4/day Yasmin Harvey MD      And    LORazepam  1 mg Intramuscular Q4H PRN Max 4/day Yasmin Harvey MD      And    benztropine  0.5 mg Intramuscular Q4H PRN Max 4/day Yasmin Harvey MD      haloperidol lactate  5 mg Intramuscular Q4H PRN Max 4/day Yasmin Harvey MD      And    LORazepam  2 mg Intramuscular Q4H PRN Max 4/day Yasmin Harvey MD      And    benztropine  1 mg Intramuscular Q4H PRN Max 4/day Yasmin Harvey MD      benztropine  0.5 mg Oral BID La Flores MD      benztropine  1 mg Oral Q4H PRN Max 6/day Yasmin Harvey MD      bisacodyl  10 mg Rectal Daily PRN Yasmin Harvey MD      hydrOXYzine HCL  50 mg Oral Q6H PRN Max 4/day Yasmin Harvey MD      Or    diphenhydrAMINE  50 mg Intramuscular Q6H PRN Yasmin Harvey MD      diphenhydrAMINE-zinc acetate   Topical BID PRN Anjel Sagastume MD      escitalopram  20 mg Oral Daily La Flores MD      haloperidol  1 mg Oral Q6H PRN Yasmin Harvey MD      haloperidol   2.5 mg Oral Q4H PRN Max 4/day Yasmin Harvey MD      haloperidol  5 mg Oral Q4H PRN Max 4/day Yasmin Harvey MD      hydrOXYzine HCL  100 mg Oral Q6H PRN Max 4/day Yasmin Harvey MD      Or    LORazepam  2 mg Intramuscular Q6H PRN Yasmin Harvey MD      hydrOXYzine HCL  25 mg Oral Q6H PRN Max 4/day Yasmin Harvey MD      lithium carbonate  1,200 mg Oral HS HOLLI Anderson      lithium carbonate  300 mg Oral Daily HOLLI Anderosn      loxapine  60 mg Oral BID HOLLI Anderson      melatonin  3 mg Oral HS Yasmin Harvey MD      mirtazapine  45 mg Oral HS La Flores MD      nicotine polacrilex  4 mg Oral Q2H PRN Yasmin Harvey MD      polyethylene glycol  17 g Oral Daily PRN Yasmin Harvey MD      polyethylene glycol  17 g Oral Daily HOLLI Monge      senna-docusate sodium  1 tablet Oral Daily PRN Yasmin Harvey MD      traZODone  50 mg Oral HS PRN Yasmin Harvey MD      white petrolatum-mineral oil   Topical TID PRN Anjel Sagastume MD       Risks / Benefits of Treatment:    Risks, benefits, and possible side effects of medications explained to patient and patient verbalizes understanding and agreement for treatment.    Counseling / Coordination of Care:    Patient's progress discussed with staff in treatment team meeting.  Medications, treatment progress and treatment plan reviewed with patient.    HOLLI Anderson 01/10/24

## 2024-01-10 NOTE — NURSING NOTE
"Pt is visible on the unit and social with select peers. Pt is pleasant and cooperative upon approach. Pt is reporting ongoing AH saying that they will hurt himself and his family.\" Pt denies SI, HI, VH. Pt is medication and meal compliant. Denies any needs at this time.   "

## 2024-01-11 LAB
ALBUMIN SERPL BCP-MCNC: 4 G/DL (ref 3.5–5)
ALP SERPL-CCNC: 76 U/L (ref 34–104)
ALT SERPL W P-5'-P-CCNC: 50 U/L (ref 7–52)
ANION GAP SERPL CALCULATED.3IONS-SCNC: 7 MMOL/L
AST SERPL W P-5'-P-CCNC: 23 U/L (ref 13–39)
BASOPHILS # BLD AUTO: 0.07 THOUSANDS/ÂΜL (ref 0–0.1)
BASOPHILS NFR BLD AUTO: 1 % (ref 0–1)
BILIRUB SERPL-MCNC: 0.43 MG/DL (ref 0.2–1)
BUN SERPL-MCNC: 12 MG/DL (ref 5–25)
CALCIUM SERPL-MCNC: 9.4 MG/DL (ref 8.4–10.2)
CHLORIDE SERPL-SCNC: 102 MMOL/L (ref 96–108)
CHOLEST SERPL-MCNC: 161 MG/DL
CO2 SERPL-SCNC: 27 MMOL/L (ref 21–32)
CREAT SERPL-MCNC: 0.9 MG/DL (ref 0.6–1.3)
EOSINOPHIL # BLD AUTO: 0.33 THOUSAND/ÂΜL (ref 0–0.61)
EOSINOPHIL NFR BLD AUTO: 3 % (ref 0–6)
ERYTHROCYTE [DISTWIDTH] IN BLOOD BY AUTOMATED COUNT: 13.7 % (ref 11.6–15.1)
GFR SERPL CREATININE-BSD FRML MDRD: 116 ML/MIN/1.73SQ M
GLUCOSE P FAST SERPL-MCNC: 97 MG/DL (ref 65–99)
GLUCOSE SERPL-MCNC: 97 MG/DL (ref 65–140)
HCT VFR BLD AUTO: 41.3 % (ref 36.5–49.3)
HDLC SERPL-MCNC: 49 MG/DL
HGB BLD-MCNC: 12.4 G/DL (ref 12–17)
IMM GRANULOCYTES # BLD AUTO: 0.05 THOUSAND/UL (ref 0–0.2)
IMM GRANULOCYTES NFR BLD AUTO: 1 % (ref 0–2)
LDLC SERPL CALC-MCNC: 86 MG/DL (ref 0–100)
LYMPHOCYTES # BLD AUTO: 2.58 THOUSANDS/ÂΜL (ref 0.6–4.47)
LYMPHOCYTES NFR BLD AUTO: 25 % (ref 14–44)
MCH RBC QN AUTO: 24.2 PG (ref 26.8–34.3)
MCHC RBC AUTO-ENTMCNC: 30 G/DL (ref 31.4–37.4)
MCV RBC AUTO: 81 FL (ref 82–98)
MONOCYTES # BLD AUTO: 0.95 THOUSAND/ÂΜL (ref 0.17–1.22)
MONOCYTES NFR BLD AUTO: 9 % (ref 4–12)
NEUTROPHILS # BLD AUTO: 6.46 THOUSANDS/ÂΜL (ref 1.85–7.62)
NEUTS SEG NFR BLD AUTO: 61 % (ref 43–75)
NONHDLC SERPL-MCNC: 112 MG/DL
NRBC BLD AUTO-RTO: 0 /100 WBCS
PLATELET # BLD AUTO: 297 THOUSANDS/UL (ref 149–390)
PMV BLD AUTO: 10.2 FL (ref 8.9–12.7)
POTASSIUM SERPL-SCNC: 4.1 MMOL/L (ref 3.5–5.3)
PROT SERPL-MCNC: 7.1 G/DL (ref 6.4–8.4)
RBC # BLD AUTO: 5.12 MILLION/UL (ref 3.88–5.62)
SODIUM SERPL-SCNC: 136 MMOL/L (ref 135–147)
TRIGL SERPL-MCNC: 132 MG/DL
WBC # BLD AUTO: 10.44 THOUSAND/UL (ref 4.31–10.16)

## 2024-01-11 PROCEDURE — 80053 COMPREHEN METABOLIC PANEL: CPT | Performed by: PSYCHIATRY & NEUROLOGY

## 2024-01-11 PROCEDURE — 80061 LIPID PANEL: CPT | Performed by: PSYCHIATRY & NEUROLOGY

## 2024-01-11 PROCEDURE — 99232 SBSQ HOSP IP/OBS MODERATE 35: CPT | Performed by: PSYCHIATRY & NEUROLOGY

## 2024-01-11 PROCEDURE — 85025 COMPLETE CBC W/AUTO DIFF WBC: CPT | Performed by: PSYCHIATRY & NEUROLOGY

## 2024-01-11 RX ADMIN — ESCITALOPRAM OXALATE 20 MG: 10 TABLET ORAL at 08:30

## 2024-01-11 RX ADMIN — NICOTINE POLACRILEX 4 MG: 4 GUM, CHEWING ORAL at 18:50

## 2024-01-11 RX ADMIN — BENZTROPINE MESYLATE 0.5 MG: 0.5 TABLET ORAL at 17:29

## 2024-01-11 RX ADMIN — LITHIUM CARBONATE 1200 MG: 450 TABLET, EXTENDED RELEASE ORAL at 21:17

## 2024-01-11 RX ADMIN — LITHIUM CARBONATE 300 MG: 300 TABLET, EXTENDED RELEASE ORAL at 08:30

## 2024-01-11 RX ADMIN — LOXAPINE 60 MG: 50 CAPSULE ORAL at 08:30

## 2024-01-11 RX ADMIN — MELATONIN TAB 3 MG 3 MG: 3 TAB at 21:18

## 2024-01-11 RX ADMIN — POLYETHYLENE GLYCOL 3350 17 G: 17 POWDER, FOR SOLUTION ORAL at 08:30

## 2024-01-11 RX ADMIN — LOXAPINE 60 MG: 50 CAPSULE ORAL at 21:18

## 2024-01-11 RX ADMIN — NICOTINE POLACRILEX 4 MG: 4 GUM, CHEWING ORAL at 13:45

## 2024-01-11 RX ADMIN — BENZTROPINE MESYLATE 0.5 MG: 0.5 TABLET ORAL at 08:30

## 2024-01-11 RX ADMIN — MIRTAZAPINE 45 MG: 30 TABLET, FILM COATED ORAL at 21:18

## 2024-01-11 RX ADMIN — NICOTINE POLACRILEX 4 MG: 4 GUM, CHEWING ORAL at 08:50

## 2024-01-11 NOTE — PROGRESS NOTES
01/10/24 0837   Team Meeting   Meeting Type Daily Rounds   Team Members Present   Team Members Present Physician;Nurse;   Physician Team Member Mark / Jose Luis   Nursing Team Member Earl / Giancarlo   Care Management Team Member Rabia   Patient/Family Present   Patient Present No   Patient's Family Present No     Pt visible and social. Med and meal compliant. Continue Loxitane. Awaiting Medical assistance to be active to start Clozaril. Case management to follow up with MA application. Continue to monitor. Discharge to be determined.

## 2024-01-11 NOTE — NURSING NOTE
Pt is calm and cooperative upon approach. Pt is medication and meal compliant. Pt denies SI, HI, and VH. Pt is reporting AH of voices stating they will harm him and his family. Pt verbalized that they will utilize staff if they feel that the voices worsen. Denies any needs at this time.

## 2024-01-11 NOTE — TREATMENT TEAM
01/11/24 1545   Team Meeting   Meeting Type Daily Rounds   Team Members Present   Team Members Present Physician;Nurse;   Physician Team Member Mark   Nursing Team Member Gary   Care Management Team Member Miguelito   Patient/Family Present   Patient Present No   Patient's Family Present No     Patient currently holds 201 status. Patient reports AH but denies all other symptoms. Patient is medication and meal compliant. Provider to continue loxitane titration. Patient discharge date and time to be determined.

## 2024-01-11 NOTE — PROGRESS NOTES
"Progress Note - Behavioral Health     Alberto Berumen 28 y.o. male MRN: [id number]   Unit/Bed#: [facility] 383-02 Encounter: [id number]    Behavior over the last 24 hours: unchanged.     Alberto was seen in follow-up. He appears flat, out in the milieu intermittently with his roommate. Per staff, no acute events overnight. He is still complaining of ongoing AH, which have been, \"the same.\" They are still negative and tell him they are going to harm his family. However, the patient himself has no homicidal or suicidal thoughts. The \"voices\" are still bothersome. He reports minimal improvement with loxapine, however dosage was adjusted yesterday and will continue to await response. Otherwise he offers no acute complaints.     Sleep: normal  Appetite: normal  Medication side effects: No   ROS: no complaints, all other systems are negative    Mental Status Evaluation:    Appearance:  marginal hygiene, overweight, braided hair, wearing own attire   Behavior:  cooperative, calm, guarded   Speech:  normal rate, normal volume, normal pitch   Mood:  depressed   Affect:  flat   Thought Process:  linear, concrete   Associations: concrete associations   Thought Content:  some paranoia   Perceptual Disturbances: auditory hallucinations   Risk Potential: Suicidal ideation - None at present  Homicidal ideation - None at present  Potential for aggression - No   Sensorium:  oriented to person, place, and time/date   Memory:  recent and remote memory grossly intact   Consciousness:  alert and awake   Attention/Concentration: attention span and concentration are age appropriate   Insight:  limited   Judgment: limited   Gait/Station: normal gait/station   Motor Activity: no abnormal movements     Vital signs in last 24 hours:    Temp:  [97.8 °F (36.6 °C)-98.3 °F (36.8 °C)] 97.8 °F (36.6 °C)  HR:  [100-112] 100  Resp:  [16-17] 16  BP: (135-153)/(81-84) 135/81    Laboratory results: I have personally reviewed all pertinent laboratory/tests " results    Results from the past 24 hours:   Recent Results (from the past 24 hour(s))   CBC and differential    Collection Time: 01/11/24  6:19 AM   Result Value Ref Range    WBC 10.44 (H) 4.31 - 10.16 Thousand/uL    RBC 5.12 3.88 - 5.62 Million/uL    Hemoglobin 12.4 12.0 - 17.0 g/dL    Hematocrit 41.3 36.5 - 49.3 %    MCV 81 (L) 82 - 98 fL    MCH 24.2 (L) 26.8 - 34.3 pg    MCHC 30.0 (L) 31.4 - 37.4 g/dL    RDW 13.7 11.6 - 15.1 %    MPV 10.2 8.9 - 12.7 fL    Platelets 297 149 - 390 Thousands/uL    nRBC 0 /100 WBCs    Neutrophils Relative 61 43 - 75 %    Immat GRANS % 1 0 - 2 %    Lymphocytes Relative 25 14 - 44 %    Monocytes Relative 9 4 - 12 %    Eosinophils Relative 3 0 - 6 %    Basophils Relative 1 0 - 1 %    Neutrophils Absolute 6.46 1.85 - 7.62 Thousands/µL    Immature Grans Absolute 0.05 0.00 - 0.20 Thousand/uL    Lymphocytes Absolute 2.58 0.60 - 4.47 Thousands/µL    Monocytes Absolute 0.95 0.17 - 1.22 Thousand/µL    Eosinophils Absolute 0.33 0.00 - 0.61 Thousand/µL    Basophils Absolute 0.07 0.00 - 0.10 Thousands/µL   Comprehensive metabolic panel    Collection Time: 01/11/24  6:19 AM   Result Value Ref Range    Sodium 136 135 - 147 mmol/L    Potassium 4.1 3.5 - 5.3 mmol/L    Chloride 102 96 - 108 mmol/L    CO2 27 21 - 32 mmol/L    ANION GAP 7 mmol/L    BUN 12 5 - 25 mg/dL    Creatinine 0.90 0.60 - 1.30 mg/dL    Glucose 97 65 - 140 mg/dL    Glucose, Fasting 97 65 - 99 mg/dL    Calcium 9.4 8.4 - 10.2 mg/dL    AST 23 13 - 39 U/L    ALT 50 7 - 52 U/L    Alkaline Phosphatase 76 34 - 104 U/L    Total Protein 7.1 6.4 - 8.4 g/dL    Albumin 4.0 3.5 - 5.0 g/dL    Total Bilirubin 0.43 0.20 - 1.00 mg/dL    eGFR 116 ml/min/1.73sq m   Lipid panel    Collection Time: 01/11/24  6:19 AM   Result Value Ref Range    Cholesterol 161 See Comment mg/dL    Triglycerides 132 See Comment mg/dL    HDL, Direct 49 >=40 mg/dL    LDL Calculated 86 0 - 100 mg/dL    Non-HDL-Chol (CHOL-HDL) 112 mg/dl     Most Recent Labs:   Lab Results    Component Value Date    WBC 10.44 (H) 01/11/2024    RBC 5.12 01/11/2024    HGB 12.4 01/11/2024    HCT 41.3 01/11/2024     01/11/2024    RDW 13.7 01/11/2024    NEUTROABS 6.46 01/11/2024    SODIUM 136 01/11/2024    K 4.1 01/11/2024     01/11/2024    CO2 27 01/11/2024    BUN 12 01/11/2024    CREATININE 0.90 01/11/2024    GLUC 97 01/11/2024    CALCIUM 9.4 01/11/2024    AST 23 01/11/2024    ALT 50 01/11/2024    ALKPHOS 76 01/11/2024    TP 7.1 01/11/2024    ALB 4.0 01/11/2024    TBILI 0.43 01/11/2024    CHOLESTEROL 161 01/11/2024    HDL 49 01/11/2024    TRIG 132 01/11/2024    LDLCALC 86 01/11/2024    NONHDLC 112 01/11/2024    LITHIUM 0.61 01/09/2024    UCN1VOEPGXMW 1.062 11/15/2023    SYPHILISAB Non-reactive 11/18/2023    HGBA1C 4.9 11/15/2023    EAG 94 11/15/2023       Progress Toward Goals: progressing    Assessment/Plan   Principal Problem:    Schizoaffective disorder, bipolar type (HCC)  Active Problems:    GERD (gastroesophageal reflux disease)    Medical clearance for psychiatric admission    Tobacco abuse    T wave inversion in EKG    Chronic idiopathic constipation    Vitamin B 12 deficiency    Vitamin D deficiency      Recommended Treatment:     Planned medication and treatment changes:    All current active medications have been reviewed  Encourage group therapy, milieu therapy and occupational therapy  Behavioral Health checks every 7 minutes    Continue current medications  Consideration to switch to Clozaril, however still awaiting insurance benefit transfer to [facility] planning ongoing     Current Facility-Administered Medications   Medication Dose Route Frequency Provider Last Rate    acetaminophen  650 mg Oral Q6H PRN Yasmin Harvey MD      acetaminophen  650 mg Oral Q4H PRN Yasmin Harvey MD      acetaminophen  975 mg Oral Q6H PRN Yasmin Harvey MD      aluminum-magnesium hydroxide-simethicone  30 mL Oral Q4H PRN Yasmin Harvey MD      haloperidol lactate  2.5 mg  Intramuscular Q4H PRN Max 4/day Yasmin Harvey MD      And    LORazepam  1 mg Intramuscular Q4H PRN Max 4/day Yasmin Harvey MD      And    benztropine  0.5 mg Intramuscular Q4H PRN Max 4/day Yasmin Harvey MD      haloperidol lactate  5 mg Intramuscular Q4H PRN Max 4/day Yasmin Harvey MD      And    LORazepam  2 mg Intramuscular Q4H PRN Max 4/day Yasmin Harvey MD      And    benztropine  1 mg Intramuscular Q4H PRN Max 4/day Yasmin Harvye MD      benztropine  0.5 mg Oral BID La Flores MD      benztropine  1 mg Oral Q4H PRN Max 6/day Yasmin Harvey MD      bisacodyl  10 mg Rectal Daily PRN Yasmin Harvey MD      hydrOXYzine HCL  50 mg Oral Q6H PRN Max 4/day Yasmin Harvey MD      Or    diphenhydrAMINE  50 mg Intramuscular Q6H PRN Yasmin Harvey MD      diphenhydrAMINE-zinc acetate   Topical BID PRN Anjel Sagastume MD      escitalopram  20 mg Oral Daily La Flores MD      haloperidol  1 mg Oral Q6H PRN Yasmin Harvey MD      haloperidol  2.5 mg Oral Q4H PRN Max 4/day Yasmin Harvey MD      haloperidol  5 mg Oral Q4H PRN Max 4/day Yasmin Harvey MD      hydrOXYzine HCL  100 mg Oral Q6H PRN Max 4/day Yasmin Harvey MD      Or    LORazepam  2 mg Intramuscular Q6H PRN Yasmin Harvey MD      hydrOXYzine HCL  25 mg Oral Q6H PRN Max 4/day Yasmin Harvey MD      lithium carbonate  1,200 mg Oral HS HOLLI Anderson      lithium carbonate  300 mg Oral Daily HOLLI Anderson      loxapine  60 mg Oral BID HOLLI Anderson      melatonin  3 mg Oral HS Yasmin Harvey MD      mirtazapine  45 mg Oral HS La Flores MD      nicotine polacrilex  4 mg Oral Q2H PRN Yasmin Harvey MD      polyethylene glycol  17 g Oral Daily PRN Yasmin Harvey MD      polyethylene glycol  17 g Oral Daily HOLLI Monge      senna-docusate sodium  1 tablet Oral Daily PRN Yasmin Harvey MD      traZODone  50 mg Oral HS PRN MD gilmer Hameed  petrolatum-mineral oil   Topical TID PRN Anjel Sagastume MD       Risks / Benefits of Treatment:    Risks, benefits, and possible side effects of medications explained to patient and patient verbalizes understanding and agreement for treatment.    Counseling / Coordination of Care:    Total floor / unit time spent today 20 minutes. Greater than 50% of total time was spent with the patient and / or family counseling and / or coordination of care. A description of counseling / coordination of care:  Patient's progress discussed with staff in treatment team meeting.  Medications, treatment progress and treatment plan reviewed with patient.    Verónica Jo PA-C 01/11/24

## 2024-01-12 PROCEDURE — 99232 SBSQ HOSP IP/OBS MODERATE 35: CPT | Performed by: PSYCHIATRY & NEUROLOGY

## 2024-01-12 RX ADMIN — NICOTINE POLACRILEX 4 MG: 4 GUM, CHEWING ORAL at 17:37

## 2024-01-12 RX ADMIN — POLYETHYLENE GLYCOL 3350 17 G: 17 POWDER, FOR SOLUTION ORAL at 08:31

## 2024-01-12 RX ADMIN — LITHIUM CARBONATE 300 MG: 300 TABLET, EXTENDED RELEASE ORAL at 08:30

## 2024-01-12 RX ADMIN — LITHIUM CARBONATE 1200 MG: 450 TABLET, EXTENDED RELEASE ORAL at 21:40

## 2024-01-12 RX ADMIN — LOXAPINE 70 MG: 50 CAPSULE ORAL at 21:40

## 2024-01-12 RX ADMIN — BENZTROPINE MESYLATE 0.5 MG: 0.5 TABLET ORAL at 08:30

## 2024-01-12 RX ADMIN — NICOTINE POLACRILEX 4 MG: 4 GUM, CHEWING ORAL at 15:14

## 2024-01-12 RX ADMIN — BENZTROPINE MESYLATE 0.5 MG: 0.5 TABLET ORAL at 17:37

## 2024-01-12 RX ADMIN — ESCITALOPRAM OXALATE 20 MG: 10 TABLET ORAL at 08:30

## 2024-01-12 RX ADMIN — NICOTINE POLACRILEX 4 MG: 4 GUM, CHEWING ORAL at 08:31

## 2024-01-12 RX ADMIN — LOXAPINE 60 MG: 50 CAPSULE ORAL at 08:30

## 2024-01-12 RX ADMIN — MELATONIN TAB 3 MG 3 MG: 3 TAB at 21:40

## 2024-01-12 RX ADMIN — MIRTAZAPINE 45 MG: 30 TABLET, FILM COATED ORAL at 21:40

## 2024-01-12 NOTE — PROGRESS NOTES
"Progress Note - Behavioral Health     Alberto Berumen 28 y.o. male MRN: [id number]   Unit/Bed#: [facility] 383-02 Encounter: [id number]    Behavior over the last 24 hours: unchanged.     Alberto continues to report auditory hallucinations of \"voices saying that they will kill me and my family and that I am going to hell\". He also still reports depressive symptoms and rates mood as 5 on a scale of 1 (best mood) to 10 (worst mood) today. He denies suicidal thoughts. Limited group attendance. Compliant with medications.    Sleep: normal  Appetite: normal  Medication side effects: No   ROS: reports rash, denies any shortness of breath or chest pain, all other systems are negative    Mental Status Evaluation:    Appearance:  casually dressed   Behavior:  cooperative, guarded, poor eye contact   Speech:  scant, soft   Mood:  depressed   Affect:  blunted   Thought Process:  organized, concrete, poverty of thought   Associations: concrete associations   Thought Content:  some paranoia   Perceptual Disturbances: auditory hallucinations of \"voices saying that they will kill me and my family and that I am going to hell\", no visual hallucinations   Risk Potential: Suicidal ideation - None  Homicidal ideation - None  Potential for aggression - No   Sensorium:  oriented to person, place, and time/date   Memory:  recent and remote memory grossly intact   Consciousness:  alert and awake   Attention/Concentration: decreased concentration and decreased attention span   Insight:  impaired   Judgment: impaired   Gait/Station: normal gait/station, normal balance   Motor Activity: no abnormal movements     Vital signs in last 24 hours:    Temp:  [97.8 °F (36.6 °C)-98.1 °F (36.7 °C)] 97.8 °F (36.6 °C)  HR:  [] 95  Resp:  [16] 16  BP: (141-147)/(84-85) 147/84    Laboratory results: I have personally reviewed all pertinent laboratory/tests results    Results from the past 24 hours: No results found for this or any previous visit (from the " past 24 hour(s)).    Suicide/Homicide Risk Assessment:    Risk of Harm to Self:   Nursing Suicide Risk Assessment Last 24 hours: C-SSRS Risk (Since Last Contact)  Calculated C-SSRS Risk Score (Since Last Contact): No Risk Indicated  Current Specific Risk Factors include: mental illness diagnosis, current depressive symptoms, current psychotic symptoms  Protective Factors: no current suicidal ideation, ability to communicate with staff on the unit, taking medications as ordered on the unit  Based on today's assessment, Alberto presents the following risk of harm to self: low    Risk of Harm to Others:  Nursing Homicide Risk Assessment: Violence Risk to Others: Denies within past 6 months  Based on today's assessment, Alberto presents the following risk of harm to others: low    The following interventions are recommended: behavioral checks every 7 minutes, continued hospitalization on locked unit    Progress Toward Goals: no significant progress, still depressed, denies current suicidal thoughts, still reports psychotic symptoms    Assessment/Plan   Principal Problem:    Schizoaffective disorder, bipolar type (HCC)  Active Problems:    GERD (gastroesophageal reflux disease)    Medical clearance for psychiatric admission    Tobacco abuse    T wave inversion in EKG    Chronic idiopathic constipation    Vitamin B 12 deficiency    Vitamin D deficiency      Recommended Treatment:     Planned medication and treatment changes:    All current active medications have been reviewed  Encourage group therapy, milieu therapy and occupational therapy  Behavioral Health checks every 7 minutes  Appreciate medical service input regarding rash - likely fungal rash. Ketoconazole body wash ordered  Await insurance benefit transfer to [place] to start treatment with Clozaril  Titrate Loxapine gradually for treatment of psychotic symptoms    Continue all other medications:    Current Facility-Administered Medications   Medication  Dose Route Frequency Provider Last Rate    acetaminophen  650 mg Oral Q6H PRN Yasmin Harvey MD      acetaminophen  650 mg Oral Q4H PRN Yasmin Harvey MD      acetaminophen  975 mg Oral Q6H PRN Yasmin Harvey MD      aluminum-magnesium hydroxide-simethicone  30 mL Oral Q4H PRN Yasmin Harvey MD      haloperidol lactate  2.5 mg Intramuscular Q4H PRN Max 4/day Yasmin Harvey MD      And    LORazepam  1 mg Intramuscular Q4H PRN Max 4/day Yasmin Harvey MD      And    benztropine  0.5 mg Intramuscular Q4H PRN Max 4/day Yasmin Harvey MD      haloperidol lactate  5 mg Intramuscular Q4H PRN Max 4/day Yasmin Harvey MD      And    LORazepam  2 mg Intramuscular Q4H PRN Max 4/day Yasmin Harvey MD      And    benztropine  1 mg Intramuscular Q4H PRN Max 4/day Yasmin Harvey MD      benztropine  0.5 mg Oral BID La Flores MD      benztropine  1 mg Oral Q4H PRN Max 6/day Yasmin Harvey MD      bisacodyl  10 mg Rectal Daily PRN Yasmin Harvey MD      hydrOXYzine HCL  50 mg Oral Q6H PRN Max 4/day Yasmin Harvey MD      Or    diphenhydrAMINE  50 mg Intramuscular Q6H PRN Yasmin Harvey MD      diphenhydrAMINE-zinc acetate   Topical BID PRN Anjel Sagastume MD      escitalopram  20 mg Oral Daily La Flores MD      haloperidol  1 mg Oral Q6H PRN Yasmin Harvey MD      haloperidol  2.5 mg Oral Q4H PRN Max 4/day Yasmin Harvey MD      haloperidol  5 mg Oral Q4H PRN Max 4/day Yasmin Harvey MD      hydrocortisone   Topical 4x Daily PRN HOLLI Monge      hydrOXYzine HCL  100 mg Oral Q6H PRN Max 4/day Yasmin Harvey MD      Or    LORazepam  2 mg Intramuscular Q6H PRN Yasmin Harvey MD      hydrOXYzine HCL  25 mg Oral Q6H PRN Max 4/day Yasmin Harvey MD      ketoconazole  1 Application Topical Daily HOLLI Monge      lithium carbonate  1,200 mg Oral HS HOLLI Anderson      lithium carbonate  300 mg Oral Daily HOLLI Anderson       loxapine  70 mg Oral BID HOLLI Anderson      melatonin  3 mg Oral HS Yasmin Harvey MD      mirtazapine  45 mg Oral HS La Flores MD      nicotine polacrilex  4 mg Oral Q2H PRN Yasmin Harvey MD      polyethylene glycol  17 g Oral Daily PRN Yasmin Harvey MD      polyethylene glycol  17 g Oral Daily HOLLI Monge      senna-docusate sodium  1 tablet Oral Daily PRN Yasmin Harvey MD      traZODone  50 mg Oral HS PRN Yasmin Harvey MD      white petrolatum-mineral oil   Topical TID PRN Anjel Sagastume MD       Risks / Benefits of Treatment:    Risks, benefits, and possible side effects of medications explained to patient. Patient has limited understanding of risks and benefits of treatment at this time, but agrees to take medications as prescribed.    Counseling / Coordination of Care:    Patient's progress discussed with staff in treatment team meeting.  Medications, treatment progress and treatment plan reviewed with patient.    La Flores MD 01/13/24

## 2024-01-12 NOTE — PROGRESS NOTES
Progress Note - Behavioral Health     Alberto Berumen 28 y.o. male MRN: [id number]   Unit/Bed#: [facility] 383-02 Encounter: [id number]    Documentation, nursing notes, medication reconciliation, labs, and vitals reviewed. Patient was seen for continuing care and reviewed with treatment team. No acute events over the past 24 hours.  Per nursing report, continues calm and cooperative.  Can be isolative at times, occasionally social with peers.  Reports ongoing auditory hallucinations of voices saying they are going to kill his family and him.    No medication changes over the past 24 hours. Continues to tolerate current medications with no adverse effects.     On evaluation today, Alberto is seen in his room and resting in bed.  Continues poorly motivated and isolative especially during the morning.  Continues to report no improvement in auditory hallucinations.  Continues to state they cause him distress.  Continues to report depression and hopelessness due to lack of improvement.  Able to contract for safety on the unit and does feel safe here in the hospital.    No suicidal ideation, plan, or intent.  Reports ongoing perceptual disturbances and does not exhibit any symptoms of aimee on evaluation.    Psychiatric ROS:  Behavior over the last 24 hours: unchanged  Sleep: hypersomnia  Appetite: fair  Medication side effects: No   ROS: no complaints, all other systems are negative    Mental Status Evaluation:    Appearance:  marginal hygiene   Behavior:  guarded   Speech:  slow, scant, soft   Mood:  dysphoric   Affect:  constricted   Thought Process:  decreased rate of thoughts   Associations: concrete associations   Thought Content:  some paranoia   Perceptual Disturbances: auditory hallucinations of voices threatening to harm him or his family or telling him he will go to hell   Risk Potential: Suicidal ideation - None  Homicidal ideation - None  Potential for aggression - No   Sensorium:  oriented to person, place, and  time/date   Memory:  recent and remote memory grossly intact   Consciousness:  alert and awake   Attention/Concentration: decreased concentration and decreased attention span   Insight:  limited   Judgment: limited   Gait/Station: normal gait/station, normal balance   Motor Activity: no abnormal movements     Vital signs in last 24 hours:    Temp:  [98 °F (36.7 °C)-98.5 °F (36.9 °C)] 98.5 °F (36.9 °C)  HR:  [] 97  Resp:  [18] 18  BP: (138-139)/(80-81) 138/81    Laboratory results: I have personally reviewed all pertinent laboratory/tests results    Results from the past 24 hours: No results found for this or any previous visit (from the past 24 hour(s)).    Suicide/Homicide Risk Assessment:    Risk of Harm to Self:   Current Specific Risk Factors include: current depressive symptoms, presence of hallucinations  Protective Factors: no current suicidal ideation, ability to communicate with staff on the unit, able to contract for safety on the unit, taking medications as ordered on the unit  Based on today's assessment, Alberto presents the following risk of harm to self: minimal    Risk of Harm to Others:  Current Specific Risk Factors include: current psychotic symptoms  Protective Factors: no current homicidal ideation, able to communicate with staff on the unit, impulse control is improving, compliant with medications on the unit as ordered  Based on today's assessment, Alberto presents the following risk of harm to others: minimal    The following interventions are recommended: behavioral checks every 7 minutes, continued hospitalization on locked unit    Progress Toward Goals: no significant improvement, still psychotic, does not participate in milieu therapy, does not participate in groups, stays in the room    Assessment/Plan   Principal Problem:    Schizoaffective disorder, bipolar type (HCC)  Active Problems:    GERD (gastroesophageal reflux disease)    Medical clearance for psychiatric admission     Tobacco abuse    T wave inversion in EKG    Chronic idiopathic constipation    Vitamin B 12 deficiency    Vitamin D deficiency      Recommended Treatment:     Planned medication and treatment changes:    All current active medications have been reviewed  Encourage group therapy, milieu therapy and occupational therapy  Behavioral Health checks every 7 minutes  Increase Loxitane to 70 mg twice daily  Continue all other current medications:    Patient's symptoms remain treatment resistant.  We await approval for [place] medical assistance so we can initiate Clozaril.  In the meantime we continue to titrate up on his loxitane    Lithium level remained stable at 0.6    Current Facility-Administered Medications   Medication Dose Route Frequency Provider Last Rate    acetaminophen  650 mg Oral Q6H PRN Yasmin Harvey MD      acetaminophen  650 mg Oral Q4H PRN Yasmin Harvey MD      acetaminophen  975 mg Oral Q6H PRN Yasmin Harvey MD      aluminum-magnesium hydroxide-simethicone  30 mL Oral Q4H PRN Yasmin Harvey MD      haloperidol lactate  2.5 mg Intramuscular Q4H PRN Max 4/day Yasmin Harvey MD      And    LORazepam  1 mg Intramuscular Q4H PRN Max 4/day Yasmin Havrey MD      And    benztropine  0.5 mg Intramuscular Q4H PRN Max 4/day Yasmin Harvey MD      haloperidol lactate  5 mg Intramuscular Q4H PRN Max 4/day Yasmin Harvey MD      And    LORazepam  2 mg Intramuscular Q4H PRN Max 4/day Yasmin Harvey MD      And    benztropine  1 mg Intramuscular Q4H PRN Max 4/day Yasmin Harvey MD      benztropine  0.5 mg Oral BID La Flores MD      benztropine  1 mg Oral Q4H PRN Max 6/day Yasmin Harvey MD      bisacodyl  10 mg Rectal Daily PRN Yasmin Harvey MD      hydrOXYzine HCL  50 mg Oral Q6H PRN Max 4/day Yasmin Harvey MD      Or    diphenhydrAMINE  50 mg Intramuscular Q6H PRN Yasmin Harvey MD      diphenhydrAMINE-zinc acetate   Topical BID PRN Anjel Sagastume MD       escitalopram  20 mg Oral Daily La Flores MD      haloperidol  1 mg Oral Q6H PRN Yasmin Harvey MD      haloperidol  2.5 mg Oral Q4H PRN Max 4/day Yasmin Harvey MD      haloperidol  5 mg Oral Q4H PRN Max 4/day Yasmin Harvey MD      hydrOXYzine HCL  100 mg Oral Q6H PRN Max 4/day Yasmin Harvey MD      Or    LORazepam  2 mg Intramuscular Q6H PRN Yasmin Harvey MD      hydrOXYzine HCL  25 mg Oral Q6H PRN Max 4/day Yasmin Harvey MD      lithium carbonate  1,200 mg Oral HS HOLLI Anderson      lithium carbonate  300 mg Oral Daily HOLLI Anderson      loxapine  70 mg Oral BID HOLLI Anderson      melatonin  3 mg Oral HS Yasmin Harvey MD      mirtazapine  45 mg Oral HS La Flores MD      nicotine polacrilex  4 mg Oral Q2H PRN Yasmin Harvey MD      polyethylene glycol  17 g Oral Daily PRN Yasmin Harvey MD      polyethylene glycol  17 g Oral Daily HOLLI Monge      senna-docusate sodium  1 tablet Oral Daily PRN Yasmin Harvey MD      traZODone  50 mg Oral HS PRN Yasmin Harvey MD      white petrolatum-mineral oil   Topical TID PRN Anjel Sagastume MD       Risks / Benefits of Treatment:    Risks, benefits, and possible side effects of medications explained to patient and patient verbalizes understanding and agreement for treatment.    Counseling / Coordination of Care:    Patient's progress discussed with staff in treatment team meeting.  Medications, treatment progress and treatment plan reviewed with patient.    HOLLI Anderson 01/12/24

## 2024-01-12 NOTE — NURSING NOTE
Patient is calm and cooperative upon approach. Patient is visible on unit, and socializing with peers. Patient reports AH of voices saying its going to kill his family and himself. Patient verbalizes feeling safe on unit. Patient denies other psych symptoms. Patient is compliant with meds and meals.

## 2024-01-12 NOTE — NURSING NOTE
Patient is in the community, calm and cooperative. Patient makes needs known and did not express any unmet needs. Patient participates in unit activities. Patient is compliant with scheduled medications. Staff maintained continuous rounding for safety and support.

## 2024-01-12 NOTE — PROGRESS NOTES
01/12/24 1556   Team Meeting   Meeting Type Daily Rounds   Team Members Present   Team Members Present Physician;Nurse;   Physician Team Member Mark   Nursing Team Member [facility] Team Member Miguelito   Patient/Family Present   Patient Present No   Patient's Family Present No     Patient currently holds 201 status. Patient reports AH, but denies all other symptoms. Patient is medication and meal compliant. Provider to increase Loxapine over the course of the weekend. Patient discharge date and time to be determined.

## 2024-01-12 NOTE — CASE MANAGEMENT
Writer called [facility] to obtain update on [place] application. Writer was told the the barrier is they need further proof of closure of [place] benefit, patient needs to change address with social security, and display proof of resources. Writer then called social security to begin working on current barriers. Writer and patient were made aware that his representative payee is the only individual allowed to make changes to the account. Writer charisse Patient sign ALEXIA for sister. Writer to contact sister and request she make the change.

## 2024-01-13 PROCEDURE — 99232 SBSQ HOSP IP/OBS MODERATE 35: CPT | Performed by: PSYCHIATRY & NEUROLOGY

## 2024-01-13 RX ORDER — KETOCONAZOLE 20 MG/ML
1 SHAMPOO TOPICAL DAILY
Qty: 7 ML | Refills: 0 | Status: DISPENSED | OUTPATIENT
Start: 2024-01-13 | End: 2024-01-20

## 2024-01-13 RX ORDER — DIAPER,BRIEF,INFANT-TODD,DISP
EACH MISCELLANEOUS 4 TIMES DAILY PRN
Status: DISCONTINUED | OUTPATIENT
Start: 2024-01-13 | End: 2024-03-29 | Stop reason: HOSPADM

## 2024-01-13 RX ADMIN — HYDROCORTISONE 1 APPLICATION: 1 OINTMENT TOPICAL at 12:59

## 2024-01-13 RX ADMIN — LOXAPINE 70 MG: 50 CAPSULE ORAL at 21:38

## 2024-01-13 RX ADMIN — BENZTROPINE MESYLATE 0.5 MG: 0.5 TABLET ORAL at 09:06

## 2024-01-13 RX ADMIN — NICOTINE POLACRILEX 4 MG: 4 GUM, CHEWING ORAL at 17:51

## 2024-01-13 RX ADMIN — KETOCONAZOLE 1 APPLICATION: 20 SHAMPOO, SUSPENSION TOPICAL at 12:59

## 2024-01-13 RX ADMIN — NICOTINE POLACRILEX 4 MG: 4 GUM, CHEWING ORAL at 09:06

## 2024-01-13 RX ADMIN — POLYETHYLENE GLYCOL 3350 17 G: 17 POWDER, FOR SOLUTION ORAL at 09:05

## 2024-01-13 RX ADMIN — LITHIUM CARBONATE 1200 MG: 450 TABLET, EXTENDED RELEASE ORAL at 21:38

## 2024-01-13 RX ADMIN — BENZTROPINE MESYLATE 0.5 MG: 0.5 TABLET ORAL at 17:51

## 2024-01-13 RX ADMIN — LOXAPINE 70 MG: 50 CAPSULE ORAL at 09:06

## 2024-01-13 RX ADMIN — Medication 1 APPLICATION: at 09:16

## 2024-01-13 RX ADMIN — NICOTINE POLACRILEX 4 MG: 4 GUM, CHEWING ORAL at 13:02

## 2024-01-13 RX ADMIN — MIRTAZAPINE 45 MG: 30 TABLET, FILM COATED ORAL at 21:38

## 2024-01-13 RX ADMIN — ESCITALOPRAM OXALATE 20 MG: 10 TABLET ORAL at 09:06

## 2024-01-13 RX ADMIN — LITHIUM CARBONATE 300 MG: 300 TABLET, EXTENDED RELEASE ORAL at 09:05

## 2024-01-13 RX ADMIN — MELATONIN TAB 3 MG 3 MG: 3 TAB at 21:38

## 2024-01-13 NOTE — NURSING NOTE
"Pt is calm and scant in conversation reporting \"frustration\" that AH remain. Reports AH not command in nature saying he and his family are going to die. Denies SI/HI/VH. Reports recurrence of small black papular rash on back, abdomen, and neck. Medical provider aware and new orders received. Pt given ketoconazole shampoo to use with shower today. PRN hydrocortisone cream given for rash at 1259, rash improving, intervention effective.  Eucerin cream given at 0916 for dry skin in rash areas, effective when reassessed at 1016. Nicotine gum given for cravings. No unmet needs at this time.    "

## 2024-01-13 NOTE — PROGRESS NOTES
"Progress Note - Behavioral Health     Alberto Berumen 28 y.o. male MRN: [id number]   Unit/Bed#: [facility] 383-02 Encounter: [id number]    Behavior over the last 24 hours: minimal improvement.     Alberto states he feels slightly less depressed today \"Depression is a little bit better\". He rates his mood as 4 on a scale of 1 (best mood) to 10 (worst mood). He otherwise still reports auditory hallucinations of \"voices saying that they will kill me or my family and that I am gong to hell\". Blunted affect, poor eye contact, soft speech. Limited group attendance. Has been taking medications.    Sleep: normal  Appetite: normal  Medication side effects: No   ROS: reports rash (fungal as per medical service), denies any shortness of breath or chest pain, all other systems are negative    Mental Status Evaluation:    Appearance:  casually dressed   Behavior:  cooperative, guarded, poor eye contact   Speech:  scant, soft   Mood:  slightly less depressed   Affect:  blunted   Thought Process:  organized, concrete, poverty of thought   Associations: concrete associations   Thought Content:  some paranoia   Perceptual Disturbances: auditory hallucinations of \"voices saying that they will kill me and my family and that I am gong to hell\", no visual hallucinations   Risk Potential: Suicidal ideation - None at present  Homicidal ideation - None  Potential for aggression - No   Sensorium:  oriented to person, place, and time/date   Memory:  recent and remote memory grossly intact   Consciousness:  alert and awake   Attention/Concentration: decreased concentration and decreased attention span   Insight:  impaired   Judgment: impaired   Gait/Station: normal gait/station, normal balance   Motor Activity: no abnormal movements     Vital signs in last 24 hours:    Temp:  [97.6 °F (36.4 °C)-98 °F (36.7 °C)] 97.6 °F (36.4 °C)  HR:  [] 84  Resp:  [18] 18  BP: (153-169)/() 153/83    Laboratory results: I have personally reviewed all " pertinent laboratory/tests results    Results from the past 24 hours: No results found for this or any previous visit (from the past 24 hour(s)).    Suicide/Homicide Risk Assessment:    Risk of Harm to Self:   Nursing Suicide Risk Assessment Last 24 hours: C-SSRS Risk (Since Last Contact)  Calculated C-SSRS Risk Score (Since Last Contact): No Risk Indicated  Current Specific Risk Factors include: mental illness diagnosis, current depressive symptoms, current psychotic symptoms, presence of hallucinations  Protective Factors: no current suicidal ideation, ability to communicate with staff on the unit, taking medications as ordered on the unit  Based on today's assessment, Alberto presents the following risk of harm to self: low    Risk of Harm to Others:  Nursing Homicide Risk Assessment: Violence Risk to Others: Denies within past 6 months  Based on today's assessment, Alberto presents the following risk of harm to others: low    The following interventions are recommended: behavioral checks every 7 minutes, continued hospitalization on locked unit    Progress Toward Goals: minimal progress, slightly less depressed, poor motivation, still reports psychotic symptoms    Assessment/Plan   Principal Problem:    Schizoaffective disorder, bipolar type ([facility])  Active Problems:    GERD (gastroesophageal reflux disease)    Medical clearance for psychiatric admission    Tobacco abuse    T wave inversion in EKG    Chronic idiopathic constipation    Vitamin B 12 deficiency    Vitamin D deficiency      Recommended Treatment:     Planned medication and treatment changes:    All current active medications have been reviewed  Encourage group therapy, milieu therapy and occupational therapy  Behavioral Health checks every 7 minutes  Consider Extended Acute Care referral due to lack of progress  Await insurance benefit transfer to [place] to initiate treatment with Clozaril  Increase Loxapine to 80 mg bid to help with psychotic  symptoms    Continue all other medications:    Current Facility-Administered Medications   Medication Dose Route Frequency Provider Last Rate    acetaminophen  650 mg Oral Q6H PRN Yasmin Harvey MD      acetaminophen  650 mg Oral Q4H PRN Yasmin Harvey MD      acetaminophen  975 mg Oral Q6H PRN Yasmin Harvey MD      aluminum-magnesium hydroxide-simethicone  30 mL Oral Q4H PRN Yasmin Harvey MD      haloperidol lactate  2.5 mg Intramuscular Q4H PRN Max 4/day Yasmin Harvey MD      And    LORazepam  1 mg Intramuscular Q4H PRN Max 4/day Yasmin Harvey MD      And    benztropine  0.5 mg Intramuscular Q4H PRN Max 4/day Yasmin Harvey MD      haloperidol lactate  5 mg Intramuscular Q4H PRN Max 4/day Yasmin Harvey MD      And    LORazepam  2 mg Intramuscular Q4H PRN Max 4/day Yasmin Harvey MD      And    benztropine  1 mg Intramuscular Q4H PRN Max 4/day Yasmin Harvey MD      benztropine  0.5 mg Oral BID La Flores MD      benztropine  1 mg Oral Q4H PRN Max 6/day Yasmin Havrey MD      bisacodyl  10 mg Rectal Daily PRN Yasmin Harvey MD      hydrOXYzine HCL  50 mg Oral Q6H PRN Max 4/day Yasmin Harvey MD      Or    diphenhydrAMINE  50 mg Intramuscular Q6H PRN Yasmin Harvey MD      diphenhydrAMINE-zinc acetate   Topical BID PRN Anjel Sagastume MD      escitalopram  20 mg Oral Daily La Flores MD      haloperidol  1 mg Oral Q6H PRN Yasmin Harvey MD      haloperidol  2.5 mg Oral Q4H PRN Max 4/day Yasmin Harvey MD      haloperidol  5 mg Oral Q4H PRN Max 4/day Yasmin Harvey MD      hydrocortisone   Topical 4x Daily PRN HOLLI Monge      hydrOXYzine HCL  100 mg Oral Q6H PRN Max 4/day Yasmin Harvey MD      Or    LORazepam  2 mg Intramuscular Q6H PRN Yasmin Harvey MD      hydrOXYzine HCL  25 mg Oral Q6H PRN Max 4/day Yasmin Harvey MD      ketoconazole  1 Application Topical Daily HOLLI Monge      lithium carbonate   1,200 mg Oral HS HOLLI Anderson      lithium carbonate  300 mg Oral Daily HOLLI Anderson      loxapine  80 mg Oral BID La Flores MD      melatonin  3 mg Oral HS Yasmin Harvey MD      mirtazapine  45 mg Oral HS La Flores MD      nicotine polacrilex  4 mg Oral Q2H PRN Yasmin Harvey MD      polyethylene glycol  17 g Oral Daily PRN Yasmin Harvey MD      polyethylene glycol  17 g Oral Daily HOLLI Monge      senna-docusate sodium  1 tablet Oral Daily PRN Yasmin Harvey MD      traZODone  50 mg Oral HS PRN Yasmin Harvey MD      white petrolatum-mineral oil   Topical TID PRN Anjel Sagastume MD       Risks / Benefits of Treatment:    Risks, benefits, and possible side effects of medications explained to patient. Patient has limited understanding of risks and benefits of treatment at this time, but agrees to take medications as prescribed.    Counseling / Coordination of Care:    Patient's progress discussed with staff in treatment team meeting.  Medications, treatment progress and treatment plan reviewed with patient.  Medication changes discussed with patient.    La Flores MD 01/14/24

## 2024-01-13 NOTE — PLAN OF CARE
Problem: PSYCHOSIS  Goal: Will report no hallucinations or delusions  Description: Interventions:  - Administer medication as  ordered  - Every waking shifts and PRN assess for the presence of hallucinations and or delusions  - Assist with reality testing to support increasing orientation  - Assess if patient's hallucinations or delusions are encouraging self-harm or harm to others and intervene as appropriate  1/13/2024 0056 by Abundio Salcedo RN  Outcome: Progressing  1/13/2024 0055 by Abundio Salcedo RN  Outcome: Progressing     Problem: ANXIETY  Goal: Will report anxiety at manageable levels  Description: INTERVENTIONS:  - Administer medication as ordered  - Teach and encourage coping skills  - Provide emotional support  - Assess patient/family for anxiety and ability to cope  1/13/2024 0056 by Abundio Salcedo RN  Outcome: Progressing  1/13/2024 0055 by Abundio Salcedo RN  Outcome: Progressing     Problem: Ineffective Coping  Goal: Participates in unit activities  Description: Interventions:  - Provide therapeutic environment   - Provide required programming   - Redirect inappropriate behaviors   Outcome: Progressing     Problem: Depression  Goal: Treatment Goal: Demonstrate behavioral control of depressive symptoms, verbalize feelings of improved mood/affect, and adopt new coping skills prior to discharge  1/13/2024 0056 by Abundio Salcedo RN  Outcome: Progressing  1/13/2024 0055 by Abundio Salcedo RN  Outcome: Progressing  Goal: Verbalize thoughts and feelings  Description: Interventions:  - Assess and re-assess patient's level of risk   - Engage patient in 1:1 interactions, daily, for a minimum of 15 minutes   - Encourage patient to express feelings, fears, frustrations, hopes   1/13/2024 0056 by Abundio Salcedo RN  Outcome: Progressing  1/13/2024 0055 by Abundio Salcedo RN  Outcome: Progressing  Goal: Refrain from isolation  Description: Interventions:  - Develop a trusting  relationship   - Encourage socialization   1/13/2024 0056 by Abundio Salcedo RN  Outcome: Progressing  1/13/2024 0055 by Abundio Salcedo RN  Outcome: Progressing  Goal: Refrain from self-neglect  1/13/2024 0056 by Abundio Salcedo RN  Outcome: Progressing  1/13/2024 0055 by Abundio Salcedo RN  Outcome: Progressing

## 2024-01-13 NOTE — QUICK NOTE
Notified about persistent body rash to trunk, shoulders, and neck. Family noted hst of fungal rashes. Will trial Ketoconazole 2% body wash for 7 days.

## 2024-01-13 NOTE — NURSING NOTE
Patient calm and cooperative, medication compliant.     Patient endorsed AH, states he was feeling frustrated as they will not leave. Patient denies other psychiatric symptoms at this time, denies any unmet needs.

## 2024-01-14 PROCEDURE — 99232 SBSQ HOSP IP/OBS MODERATE 35: CPT | Performed by: PSYCHIATRY & NEUROLOGY

## 2024-01-14 RX ADMIN — KETOCONAZOLE 1 APPLICATION: 20 SHAMPOO, SUSPENSION TOPICAL at 08:44

## 2024-01-14 RX ADMIN — NICOTINE POLACRILEX 4 MG: 4 GUM, CHEWING ORAL at 08:43

## 2024-01-14 RX ADMIN — MELATONIN TAB 3 MG 3 MG: 3 TAB at 21:17

## 2024-01-14 RX ADMIN — BENZTROPINE MESYLATE 0.5 MG: 0.5 TABLET ORAL at 17:57

## 2024-01-14 RX ADMIN — MIRTAZAPINE 45 MG: 30 TABLET, FILM COATED ORAL at 21:17

## 2024-01-14 RX ADMIN — ESCITALOPRAM OXALATE 20 MG: 10 TABLET ORAL at 08:43

## 2024-01-14 RX ADMIN — LITHIUM CARBONATE 1200 MG: 450 TABLET, EXTENDED RELEASE ORAL at 21:17

## 2024-01-14 RX ADMIN — LITHIUM CARBONATE 300 MG: 300 TABLET, EXTENDED RELEASE ORAL at 08:43

## 2024-01-14 RX ADMIN — LOXAPINE 70 MG: 50 CAPSULE ORAL at 08:43

## 2024-01-14 RX ADMIN — POLYETHYLENE GLYCOL 3350 17 G: 17 POWDER, FOR SOLUTION ORAL at 08:43

## 2024-01-14 RX ADMIN — BENZTROPINE MESYLATE 0.5 MG: 0.5 TABLET ORAL at 08:43

## 2024-01-14 RX ADMIN — NICOTINE POLACRILEX 4 MG: 4 GUM, CHEWING ORAL at 17:57

## 2024-01-14 RX ADMIN — LOXAPINE 80 MG: 25 CAPSULE ORAL at 21:17

## 2024-01-14 NOTE — NURSING NOTE
"Patient calm and cooperative, medication compliant.    Patient stated he is still hearing the auditory hallucinations, denies command, but states it is frustrating and \"I just want them gone\"  "

## 2024-01-14 NOTE — NURSING NOTE
Pt is calm with a depressed and blunted affect. Pt is scant in speech and isolative to room throughout the morning sleeping in bed. Pt says AH remain telling him he and his family are going to die. Rash remains on trunk, arms, and neck. Pt says it itches mildly and declines PRN medication at this time. Reports mild depression but no anxiety or SI/HI/VH. No unmet needs at this time.

## 2024-01-15 LAB
ATRIAL RATE: 100 BPM
ATRIAL RATE: 102 BPM
ATRIAL RATE: 98 BPM
BASOPHILS # BLD AUTO: 0.05 THOUSANDS/ÂΜL (ref 0–0.1)
BASOPHILS NFR BLD AUTO: 0 % (ref 0–1)
BNP SERPL-MCNC: 55 PG/ML (ref 0–100)
CARDIAC TROPONIN I PNL SERPL HS: 2 NG/L (ref 8–18)
CK SERPL-CCNC: 362 U/L (ref 39–308)
CRP SERPL QL: 9.8 MG/L
EOSINOPHIL # BLD AUTO: 0.31 THOUSAND/ÂΜL (ref 0–0.61)
EOSINOPHIL NFR BLD AUTO: 3 % (ref 0–6)
ERYTHROCYTE [DISTWIDTH] IN BLOOD BY AUTOMATED COUNT: 13.7 % (ref 11.6–15.1)
HCT VFR BLD AUTO: 40.6 % (ref 36.5–49.3)
HGB BLD-MCNC: 12.5 G/DL (ref 12–17)
IMM GRANULOCYTES # BLD AUTO: 0.11 THOUSAND/UL (ref 0–0.2)
IMM GRANULOCYTES NFR BLD AUTO: 1 % (ref 0–2)
LYMPHOCYTES # BLD AUTO: 2.53 THOUSANDS/ÂΜL (ref 0.6–4.47)
LYMPHOCYTES NFR BLD AUTO: 20 % (ref 14–44)
MCH RBC QN AUTO: 25.1 PG (ref 26.8–34.3)
MCHC RBC AUTO-ENTMCNC: 30.8 G/DL (ref 31.4–37.4)
MCV RBC AUTO: 82 FL (ref 82–98)
MONOCYTES # BLD AUTO: 1.24 THOUSAND/ÂΜL (ref 0.17–1.22)
MONOCYTES NFR BLD AUTO: 10 % (ref 4–12)
NEUTROPHILS # BLD AUTO: 8.37 THOUSANDS/ÂΜL (ref 1.85–7.62)
NEUTS SEG NFR BLD AUTO: 66 % (ref 43–75)
NRBC BLD AUTO-RTO: 0 /100 WBCS
P AXIS: 45 DEGREES
P AXIS: 52 DEGREES
PLATELET # BLD AUTO: 283 THOUSANDS/UL (ref 149–390)
PMV BLD AUTO: 9.3 FL (ref 8.9–12.7)
PR INTERVAL: 144 MS
PR INTERVAL: 146 MS
PR INTERVAL: 184 MS
QRS AXIS: 135 DEGREES
QRS AXIS: 46 DEGREES
QRS AXIS: 47 DEGREES
QRSD INTERVAL: 78 MS
QRSD INTERVAL: 88 MS
QRSD INTERVAL: 90 MS
QT INTERVAL: 314 MS
QT INTERVAL: 346 MS
QT INTERVAL: 348 MS
QTC INTERVAL: 409 MS
QTC INTERVAL: 444 MS
QTC INTERVAL: 446 MS
RBC # BLD AUTO: 4.98 MILLION/UL (ref 3.88–5.62)
T WAVE AXIS: -13 DEGREES
T WAVE AXIS: -4 DEGREES
T WAVE AXIS: 209 DEGREES
VENTRICULAR RATE: 100 BPM
VENTRICULAR RATE: 102 BPM
VENTRICULAR RATE: 98 BPM
WBC # BLD AUTO: 12.61 THOUSAND/UL (ref 4.31–10.16)

## 2024-01-15 PROCEDURE — 99232 SBSQ HOSP IP/OBS MODERATE 35: CPT | Performed by: PSYCHIATRY & NEUROLOGY

## 2024-01-15 PROCEDURE — 93010 ELECTROCARDIOGRAM REPORT: CPT

## 2024-01-15 PROCEDURE — 84484 ASSAY OF TROPONIN QUANT: CPT | Performed by: NURSE PRACTITIONER

## 2024-01-15 PROCEDURE — 93005 ELECTROCARDIOGRAM TRACING: CPT

## 2024-01-15 PROCEDURE — 83880 ASSAY OF NATRIURETIC PEPTIDE: CPT | Performed by: NURSE PRACTITIONER

## 2024-01-15 PROCEDURE — 82550 ASSAY OF CK (CPK): CPT | Performed by: NURSE PRACTITIONER

## 2024-01-15 PROCEDURE — 85025 COMPLETE CBC W/AUTO DIFF WBC: CPT | Performed by: NURSE PRACTITIONER

## 2024-01-15 PROCEDURE — 86140 C-REACTIVE PROTEIN: CPT | Performed by: NURSE PRACTITIONER

## 2024-01-15 RX ADMIN — BENZTROPINE MESYLATE 0.5 MG: 0.5 TABLET ORAL at 08:06

## 2024-01-15 RX ADMIN — NICOTINE POLACRILEX 4 MG: 4 GUM, CHEWING ORAL at 13:27

## 2024-01-15 RX ADMIN — MELATONIN TAB 3 MG 3 MG: 3 TAB at 21:10

## 2024-01-15 RX ADMIN — KETOCONAZOLE 1 APPLICATION: 20 SHAMPOO, SUSPENSION TOPICAL at 08:06

## 2024-01-15 RX ADMIN — LITHIUM CARBONATE 300 MG: 300 TABLET, EXTENDED RELEASE ORAL at 08:06

## 2024-01-15 RX ADMIN — LOXAPINE 80 MG: 25 CAPSULE ORAL at 08:06

## 2024-01-15 RX ADMIN — POLYETHYLENE GLYCOL 3350 17 G: 17 POWDER, FOR SOLUTION ORAL at 08:06

## 2024-01-15 RX ADMIN — ESCITALOPRAM OXALATE 20 MG: 10 TABLET ORAL at 08:06

## 2024-01-15 RX ADMIN — NICOTINE POLACRILEX 4 MG: 4 GUM, CHEWING ORAL at 19:05

## 2024-01-15 RX ADMIN — LITHIUM CARBONATE 1200 MG: 450 TABLET, EXTENDED RELEASE ORAL at 21:09

## 2024-01-15 RX ADMIN — BENZTROPINE MESYLATE 0.5 MG: 0.5 TABLET ORAL at 17:52

## 2024-01-15 RX ADMIN — LOXAPINE 70 MG: 50 CAPSULE ORAL at 21:09

## 2024-01-15 RX ADMIN — NICOTINE POLACRILEX 4 MG: 4 GUM, CHEWING ORAL at 08:30

## 2024-01-15 RX ADMIN — Medication: at 08:30

## 2024-01-15 RX ADMIN — DIPHENHYDRAMINE HYDROCHLORIDE, ZINC ACETATE: 2; .1 CREAM TOPICAL at 08:30

## 2024-01-15 RX ADMIN — MIRTAZAPINE 45 MG: 30 TABLET, FILM COATED ORAL at 21:09

## 2024-01-15 RX ADMIN — HYDROCORTISONE: 1 OINTMENT TOPICAL at 08:30

## 2024-01-15 NOTE — PROGRESS NOTES
01/15/24 1453   Team Meeting   Meeting Type Daily Rounds   Team Members Present   Team Members Present Physician;Nurse;   Physician Team Member Mark   Nursing Team Member Gary   Care Management Team Member Miguelito   Patient/Family Present   Patient Present No   Patient's Family Present No     Patient currently holds 201 status. Patient reports AH, but denies all other symptoms. Patient is medication and meal compliant.  Patient to continue on current scheduled medications. Patient discharge date and plan to be determined.

## 2024-01-15 NOTE — NURSING NOTE
Pt refused lab work and EKG at this time. Pt stated that he wanted to continue sleeping. Tech also attempted to do labs. Pt educated on importance of lab work and EKG.

## 2024-01-15 NOTE — NURSING NOTE
Pt denies SI/HI/VH. Pt reports AH of voices  stating that they are going to harm himself or his family. Pt did not want any PRN's at this time. Medication and meal compliant. Present in dayroom and milieu. 0830 PRN Eucerin, Hydrocortisone cream, and Benadryl cream given for c/o itchy skin due to rash. Pt is calm and pleasant.  0930 Pt states PRN's helped decrease itchy feeling. Pt spoke to  and agreed to complete lab work and EKG after Lunch. No further concerns as of present. Plan of care ongoing.

## 2024-01-15 NOTE — PROGRESS NOTES
Progress Note - Behavioral Health     Alberto Berumen 28 y.o. male MRN: [id number]   Unit/Bed#: [facility] 383-02 Encounter: [id number]    Documentation, nursing notes, medication reconciliation, labs, and vitals reviewed. Patient was seen for continuing care and reviewed with treatment team. No acute events over the past 24 hours.  Per nursing report, continues to isolate to his room and complained of hallucinations.  Reports some increase in frustration and irritability.    No medication changes over the past 24 hours. Continues to tolerate current medications with no adverse effects.     On evaluation today, he is seen in his room and resting in bed.  He has initially refused lab work and EKG will work ordering in preparation to start Clozaril.  I did explain to him why we will order labs and EKG, he agrees to get them done after lunch.  Was napping when they came in.  Noted to be increased irritable today.  Reports no change in auditory hallucinations threatening him and his family.  Some increase in depression.  No suicidal ideation, plan, or intent. Ongoing perceptual disturbances and does not exhibit any symptoms of aimee on evaluation.    Psychiatric ROS:  Behavior over the last 24 hours: limited improvement  Sleep: slept off and on  Appetite: fair  Medication side effects: No   ROS: no complaints, all other systems are negative    Mental Status Evaluation:    Appearance:  marginal hygiene   Behavior:  guarded   Speech:  scant   Mood:  depressed, irritable   Affect:  constricted   Thought Process:  goal directed   Associations: concrete associations   Thought Content:  no overt delusions   Perceptual Disturbances: auditory hallucinations   Risk Potential: Suicidal ideation - None  Homicidal ideation - None  Potential for aggression - No   Sensorium:  oriented to person, place, and time/date   Memory:  recent and remote memory grossly intact   Consciousness:  alert and awake   Attention/Concentration: decreased  concentration and decreased attention span   Insight:  limited   Judgment: limited   Gait/Station: normal gait/station, normal balance   Motor Activity: no abnormal movements     Vital signs in last 24 hours:    Temp:  [97.4 °F (36.3 °C)-97.5 °F (36.4 °C)] 97.4 °F (36.3 °C)  HR:  [98-99] 98  Resp:  [16] 16  BP: (153-155)/(96) 153/96    Laboratory results: I have personally reviewed all pertinent laboratory/tests results    Results from the past 24 hours: No results found for this or any previous visit (from the past 24 hour(s)).    Suicide/Homicide Risk Assessment:    Risk of Harm to Self:   Current Specific Risk Factors include: current depressive symptoms, current psychotic symptoms  Protective Factors: no current suicidal ideation, ability to communicate with staff on the unit, able to contract for safety on the unit, taking medications as ordered on the unit  Based on today's assessment, Alberto presents the following risk of harm to self: minimal    Risk of Harm to Others:  Current Specific Risk Factors include: current psychotic symptoms  Protective Factors: no current homicidal ideation, able to communicate with staff on the unit, compliant with medications on the unit as ordered  Based on today's assessment, Alberto presents the following risk of harm to others: minimal    The following interventions are recommended: behavioral checks every 7 minutes, continued hospitalization on locked unit    Progress Toward Goals: no significant improvement, poor motivation, continues to endorse psychotic symptoms    Assessment/Plan   Principal Problem:    Schizoaffective disorder, bipolar type (HCC)  Active Problems:    GERD (gastroesophageal reflux disease)    Medical clearance for psychiatric admission    Tobacco abuse    T wave inversion in EKG    Chronic idiopathic constipation    Vitamin B 12 deficiency    Vitamin D deficiency      Recommended Treatment:     Planned medication and treatment changes:    All current  active medications have been reviewed  Encourage group therapy, milieu therapy and occupational therapy  Behavioral Health checks every 7 minutes  Check CBC/diff, CPK, C-reactive protein, BNP, and ECG today    Following lab work, plan to start Clozaril 25 mg at bedtime and reduce Loxitane to 70 mg twice daily, pending results    Current Facility-Administered Medications   Medication Dose Route Frequency Provider Last Rate    acetaminophen  650 mg Oral Q6H PRN Yasmin Harvey MD      acetaminophen  650 mg Oral Q4H PRN Yasmin Harvey MD      acetaminophen  975 mg Oral Q6H PRN Yasmin Harvey MD      aluminum-magnesium hydroxide-simethicone  30 mL Oral Q4H PRN Yasmin Harvey MD      haloperidol lactate  2.5 mg Intramuscular Q4H PRN Max 4/day Yasmin Harvey MD      And    LORazepam  1 mg Intramuscular Q4H PRN Max 4/day Yasmin Harvey MD      And    benztropine  0.5 mg Intramuscular Q4H PRN Max 4/day Yasmin Harvey MD      haloperidol lactate  5 mg Intramuscular Q4H PRN Max 4/day Yasmin Harvey MD      And    LORazepam  2 mg Intramuscular Q4H PRN Max 4/day Yasmin Harvey MD      And    benztropine  1 mg Intramuscular Q4H PRN Max 4/day Yasmin Harvey MD      benztropine  0.5 mg Oral BID La Flores MD      benztropine  1 mg Oral Q4H PRN Max 6/day Yasmin Harvey MD      bisacodyl  10 mg Rectal Daily PRN Yasmin Harvey MD      hydrOXYzine HCL  50 mg Oral Q6H PRN Max 4/day Yasmin Harvey MD      Or    diphenhydrAMINE  50 mg Intramuscular Q6H PRN Yasmin Harvey MD      diphenhydrAMINE-zinc acetate   Topical BID PRN Anjel Sagastume MD      escitalopram  20 mg Oral Daily La Flores MD      haloperidol  1 mg Oral Q6H PRN Yasmin Harvey MD      haloperidol  2.5 mg Oral Q4H PRN Max 4/day Yasmin Harvey MD      haloperidol  5 mg Oral Q4H PRN Max 4/day Yasmin Harvey MD      hydrocortisone   Topical 4x Daily PRN HOLLI Monge      hydrOXYzine HCL  100 mg  Oral Q6H PRN Max 4/day Yasmin Harvey MD      Or    LORazepam  2 mg Intramuscular Q6H PRN Yasmni Harvey MD      hydrOXYzine HCL  25 mg Oral Q6H PRN Max 4/day Yasmin Harvey MD      ketoconazole  1 Application Topical Daily Eileen Holliday HOLLI Jensen      lithium carbonate  1,200 mg Oral HS HOLLI Anderson      lithium carbonate  300 mg Oral Daily HOLLI Anderson      loxapine  80 mg Oral BID La Flores MD      melatonin  3 mg Oral HS Yasmin Harvey MD      mirtazapine  45 mg Oral HS La Flores MD      nicotine polacrilex  4 mg Oral Q2H PRN Yasmin Harvey MD      polyethylene glycol  17 g Oral Daily PRN Yasmin Harvey MD      polyethylene glycol  17 g Oral Daily Eileen CherryHOLLI Jimenez      senna-docusate sodium  1 tablet Oral Daily PRN Yasmin Harvey MD      traZODone  50 mg Oral HS PRN Yasmin Harvey MD      white petrolatum-mineral oil   Topical TID PRN Anjel Sagastume MD       Risks / Benefits of Treatment:    Risks, benefits, and possible side effects of medications explained to patient and patient verbalizes understanding and agreement for treatment.    Counseling / Coordination of Care:    Patient's progress discussed with staff in treatment team meeting.  Medications, treatment progress and treatment plan reviewed with patient.    HOLLI Anderson 01/15/24

## 2024-01-15 NOTE — CASE MANAGEMENT
Writer followed up with sister on changing patients address with social security. Sharron explained she she was unable to get it done over the course of the weekend but would try again today. Ginger explained that if they were closed due to the MLK holiday she will attempt again tomorrow.

## 2024-01-15 NOTE — NURSING NOTE
Patient calm and cooperative, medication compliant.     Patient endorses AH and states frustration regarding their remaining presence. Patient denies any other psychiatric symptoms at this time.

## 2024-01-15 NOTE — PLAN OF CARE
Problem: PSYCHOSIS  Goal: Will report no hallucinations or delusions  Description: Interventions:  - Administer medication as  ordered  - Every waking shifts and PRN assess for the presence of hallucinations and or delusions  - Assist with reality testing to support increasing orientation  - Assess if patient's hallucinations or delusions are encouraging self-harm or harm to others and intervene as appropriate  1/15/2024 0015 by Svetlana Botello RN  Outcome: Progressing  1/14/2024 2354 by Svetlana Botello RN  Outcome: Progressing     Problem: ANXIETY  Goal: Will report anxiety at manageable levels  Description: INTERVENTIONS:  - Administer medication as ordered  - Teach and encourage coping skills  - Provide emotional support  - Assess patient/family for anxiety and ability to cope  1/15/2024 0015 by Svetlana Botello RN  Outcome: Progressing  1/14/2024 2354 by Svetlana Botello RN  Outcome: Progressing     Problem: Depression  Goal: Treatment Goal: Demonstrate behavioral control of depressive symptoms, verbalize feelings of improved mood/affect, and adopt new coping skills prior to discharge  1/15/2024 0015 by Svetlana Botello RN  Outcome: Progressing  1/14/2024 2354 by Svetlana Botello RN  Outcome: Progressing  Goal: Verbalize thoughts and feelings  Description: Interventions:  - Assess and re-assess patient's level of risk   - Engage patient in 1:1 interactions, daily, for a minimum of 15 minutes   - Encourage patient to express feelings, fears, frustrations, hopes   1/15/2024 0015 by Svetlana Botello RN  Outcome: Progressing  1/14/2024 2354 by Svetlana Botello RN  Outcome: Progressing  Goal: Refrain from isolation  Description: Interventions:  - Develop a trusting relationship   - Encourage socialization   1/15/2024 0015 by Svetlana Botello RN  Outcome: Progressing  1/14/2024 2354 by Svetlana Botello RN  Outcome: Progressing  Goal: Refrain from self-neglect  1/15/2024 0015 by Svetlana Botello RN  Outcome: Progressing  1/14/2024 2354 by Svetlana  MIGUEL Botello  Outcome: Progressing     Problem: DISCHARGE PLANNING  Goal: Discharge to home or other facility with appropriate resources  Description: INTERVENTIONS:  - Identify barriers to discharge w/patient and caregiver  - Arrange for needed discharge resources and transportation as appropriate  - Identify discharge learning needs (meds, wound care, etc.)  - Arrange for interpretive services to assist at discharge as needed  - Refer to Case Management Department for coordinating discharge planning if the patient needs post-hospital services based on physician/advanced practitioner order or complex needs related to functional status, cognitive ability, or social support system  1/15/2024 0015 by Svetlana Botello, RN  Outcome: Progressing  1/14/2024 3004 by Svetlana Botello, MIGUEL  Outcome: Progressing

## 2024-01-16 LAB
ATRIAL RATE: 104 BPM
ATRIAL RATE: 107 BPM
BASOPHILS # BLD AUTO: 0.03 THOUSANDS/ÂΜL (ref 0–0.1)
BASOPHILS NFR BLD AUTO: 0 % (ref 0–1)
BNP SERPL-MCNC: 6 PG/ML (ref 0–100)
CARDIAC TROPONIN I PNL SERPL HS: <2 NG/L (ref 8–18)
CK SERPL-CCNC: 272 U/L (ref 39–308)
CRP SERPL QL: 9.8 MG/L
EOSINOPHIL # BLD AUTO: 0.34 THOUSAND/ÂΜL (ref 0–0.61)
EOSINOPHIL NFR BLD AUTO: 3 % (ref 0–6)
ERYTHROCYTE [DISTWIDTH] IN BLOOD BY AUTOMATED COUNT: 13.5 % (ref 11.6–15.1)
HCT VFR BLD AUTO: 42 % (ref 36.5–49.3)
HGB BLD-MCNC: 12.4 G/DL (ref 12–17)
IMM GRANULOCYTES # BLD AUTO: 0.09 THOUSAND/UL (ref 0–0.2)
IMM GRANULOCYTES NFR BLD AUTO: 1 % (ref 0–2)
LYMPHOCYTES # BLD AUTO: 2.55 THOUSANDS/ÂΜL (ref 0.6–4.47)
LYMPHOCYTES NFR BLD AUTO: 24 % (ref 14–44)
MCH RBC QN AUTO: 24.1 PG (ref 26.8–34.3)
MCHC RBC AUTO-ENTMCNC: 29.5 G/DL (ref 31.4–37.4)
MCV RBC AUTO: 82 FL (ref 82–98)
MONOCYTES # BLD AUTO: 1 THOUSAND/ÂΜL (ref 0.17–1.22)
MONOCYTES NFR BLD AUTO: 10 % (ref 4–12)
NEUTROPHILS # BLD AUTO: 6.42 THOUSANDS/ÂΜL (ref 1.85–7.62)
NEUTS SEG NFR BLD AUTO: 62 % (ref 43–75)
NRBC BLD AUTO-RTO: 0 /100 WBCS
P AXIS: 40 DEGREES
P AXIS: 49 DEGREES
PLATELET # BLD AUTO: 294 THOUSANDS/UL (ref 149–390)
PMV BLD AUTO: 9.9 FL (ref 8.9–12.7)
PR INTERVAL: 142 MS
PR INTERVAL: 144 MS
QRS AXIS: 51 DEGREES
QRS AXIS: 53 DEGREES
QRSD INTERVAL: 90 MS
QRSD INTERVAL: 90 MS
QT INTERVAL: 352 MS
QT INTERVAL: 354 MS
QTC INTERVAL: 465 MS
QTC INTERVAL: 469 MS
RBC # BLD AUTO: 5.15 MILLION/UL (ref 3.88–5.62)
T WAVE AXIS: -22 DEGREES
T WAVE AXIS: -26 DEGREES
VENTRICULAR RATE: 104 BPM
VENTRICULAR RATE: 107 BPM
WBC # BLD AUTO: 10.43 THOUSAND/UL (ref 4.31–10.16)

## 2024-01-16 PROCEDURE — 99232 SBSQ HOSP IP/OBS MODERATE 35: CPT | Performed by: PSYCHIATRY & NEUROLOGY

## 2024-01-16 PROCEDURE — 86140 C-REACTIVE PROTEIN: CPT | Performed by: PSYCHIATRY & NEUROLOGY

## 2024-01-16 PROCEDURE — 85025 COMPLETE CBC W/AUTO DIFF WBC: CPT | Performed by: PSYCHIATRY & NEUROLOGY

## 2024-01-16 PROCEDURE — 84484 ASSAY OF TROPONIN QUANT: CPT | Performed by: PSYCHIATRY & NEUROLOGY

## 2024-01-16 PROCEDURE — 83880 ASSAY OF NATRIURETIC PEPTIDE: CPT | Performed by: PSYCHIATRY & NEUROLOGY

## 2024-01-16 PROCEDURE — 93010 ELECTROCARDIOGRAM REPORT: CPT

## 2024-01-16 PROCEDURE — 93005 ELECTROCARDIOGRAM TRACING: CPT

## 2024-01-16 PROCEDURE — 82550 ASSAY OF CK (CPK): CPT | Performed by: PSYCHIATRY & NEUROLOGY

## 2024-01-16 PROCEDURE — 99222 1ST HOSP IP/OBS MODERATE 55: CPT

## 2024-01-16 RX ADMIN — LOXAPINE 70 MG: 50 CAPSULE ORAL at 21:16

## 2024-01-16 RX ADMIN — NICOTINE POLACRILEX 4 MG: 4 GUM, CHEWING ORAL at 12:57

## 2024-01-16 RX ADMIN — ESCITALOPRAM OXALATE 20 MG: 10 TABLET ORAL at 08:15

## 2024-01-16 RX ADMIN — NICOTINE POLACRILEX 4 MG: 4 GUM, CHEWING ORAL at 17:55

## 2024-01-16 RX ADMIN — MELATONIN TAB 3 MG 3 MG: 3 TAB at 21:17

## 2024-01-16 RX ADMIN — LITHIUM CARBONATE 1200 MG: 450 TABLET, EXTENDED RELEASE ORAL at 21:16

## 2024-01-16 RX ADMIN — NICOTINE POLACRILEX 4 MG: 4 GUM, CHEWING ORAL at 15:52

## 2024-01-16 RX ADMIN — KETOCONAZOLE 1 APPLICATION: 20 SHAMPOO, SUSPENSION TOPICAL at 08:16

## 2024-01-16 RX ADMIN — BENZTROPINE MESYLATE 0.5 MG: 0.5 TABLET ORAL at 17:54

## 2024-01-16 RX ADMIN — POLYETHYLENE GLYCOL 3350 17 G: 17 POWDER, FOR SOLUTION ORAL at 08:15

## 2024-01-16 RX ADMIN — LITHIUM CARBONATE 300 MG: 300 TABLET, EXTENDED RELEASE ORAL at 08:15

## 2024-01-16 RX ADMIN — LOXAPINE 70 MG: 50 CAPSULE ORAL at 08:15

## 2024-01-16 RX ADMIN — NICOTINE POLACRILEX 4 MG: 4 GUM, CHEWING ORAL at 09:50

## 2024-01-16 RX ADMIN — MIRTAZAPINE 45 MG: 30 TABLET, FILM COATED ORAL at 21:17

## 2024-01-16 RX ADMIN — BENZTROPINE MESYLATE 0.5 MG: 0.5 TABLET ORAL at 08:15

## 2024-01-16 NOTE — NURSING NOTE
Pt is calm and cooperative. Social and visible with staff in the milieu. Reports on going AH that are manageable. Denies SI, HI, VH, and pain. Compliant with HS medication and snack. Denies unmet needs/ concerns at this time.

## 2024-01-16 NOTE — PLAN OF CARE
Problem: Ineffective Coping  Goal: Participates in unit activities  Description: Interventions:  - Provide therapeutic environment   - Provide required programming   - Redirect inappropriate behaviors   Outcome: Progressing    Patient attendance in groups is sporadic, but improving

## 2024-01-16 NOTE — CONSULTS
"  Cardiology Consultation  MD Tod Alexis MD, FACC  Zohaib Mchugh DO, [facility] FACP Ather Mansoor, MD Rujul Patel, DO, [facility]  Antwan Gutierrez DO, [facility]  ----------------------------------------------------------------  [address] [address]    Alberto Berumen 28 y.o. male MRN: [id number]  Unit/Bed#: -02 Encounter: [id number]      01/16/24    Referring Physician: La Flores MD    Chief Complain/Reason for Referal: Cardiac risk evaluation prior to initiation of clozapine    IMPRESSION:  Cardiac risk evaluation prior to initiation of clozapine  Abnormal electrocardiogram  Schizoaffective disorder, bipolar type  GERD  Tobacco abuse  Idiopathic constipation      DISCUSSION/RECOMMENDATIONS:  Reviewing his current ECGs with his old ECGs, there is no significant difference in the QRS morphology and T waves although the T wave changes are abnormal, however nonspecific -and I suspect are related to his lithium use  Lithium level seem to be stable for him, last level was measured on January 9 was 0.61.  \"Cardiotoxicity\" from lithium usually is with levels 1.5 or more, but the T wave changes associated with long-term lithium use are common and do not indicate toxic levels or toxic response.  He is currently on lithium, Lexapro, mirtazapine, loxapine and benztropine  With his current medications especially the mirtazapine, loxapine will need close monitoring of blood cholesterol as well as blood pressure as these can cause hyperlipidemia and hypotension.  His QT interval has not been significantly prolonged on most recent ECGs.  Troponin was less than 2  BNP was 6  Total   I see no cardiac contraindication at this time to stopping the loxapine to trial clozapine.  Would try to keep clozapine dose as low as possible especially given the use of Lexapro as well.  With clozapine, monitor closely for signs of clozapine induced " myocarditis    Antwan Gutierrez DO, [facility], [facility]    ----------------------------------------------------  EKG:  EKG is on January 15  Normal sinus rhythm  ST elevation, consider early repolarization, pericarditis, or injury  Nonspecific ST and T wave abnormality     Normal sinus rhythm  ST elevation, consider early repolarization, pericarditis, or injury  Abnormal ECG  ------------------------  EKGs on January 16  Sinus tachycardia  Nonspecific ST and T wave abnormality  Abnormal ECG    Sinus tachycardia  Nonspecific ST and T wave abnormality  Abnormal ECG      ======================================================    HPI:  I am seeing this patient in cardiology consultation for: Clozapine initiation    Alberto Berumen is a 28 y.o. male with:     Abnormal electrocardiogram  Schizoaffective disorder, bipolar type  GERD  Tobacco abuse  Idiopathic constipation    Cardiology has been consulted prior to initiation of clozapine due to abnormal electrocardiogram.  He is here with schizoaffective disorder, auditory hallucinations of voices that are telling him they are going to kill him and his family and that he is going to hell.  Denies suicidal thoughts currently    Past Medical History:   Diagnosis Date    Anemia     Chronic idiopathic constipation     GERD (gastroesophageal reflux disease)     Schizoaffective disorder (HCC)     Syringoma     T wave inversion in EKG          Scheduled Meds:  Current Facility-Administered Medications   Medication Dose Route Frequency Provider Last Rate    acetaminophen  650 mg Oral Q6H PRN Yasmin Harvey MD      acetaminophen  650 mg Oral Q4H PRN Yasmin Harvey MD      acetaminophen  975 mg Oral Q6H PRN Yasmin Harvey MD      aluminum-magnesium hydroxide-simethicone  30 mL Oral Q4H PRN Yasmin Harvey MD      haloperidol lactate  2.5 mg Intramuscular Q4H PRN Max 4/day Yasmin Harvey MD      And    LORazepam  1 mg Intramuscular Q4H PRN Max 4/day Yasmin Harvey MD      And     benztropine  0.5 mg Intramuscular Q4H PRN Max 4/day Yasmin Harvey MD      haloperidol lactate  5 mg Intramuscular Q4H PRN Max 4/day Yasmin Harvey MD      And    LORazepam  2 mg Intramuscular Q4H PRN Max 4/day Yasmin Harvey MD      And    benztropine  1 mg Intramuscular Q4H PRN Max 4/day Yasmin Harvey MD      benztropine  0.5 mg Oral BID La Flores MD      benztropine  1 mg Oral Q4H PRN Max 6/day Yasmin Harvey MD      bisacodyl  10 mg Rectal Daily PRN Yasmin Harvey MD      hydrOXYzine HCL  50 mg Oral Q6H PRN Max 4/day Yasmin Harvey MD      Or    diphenhydrAMINE  50 mg Intramuscular Q6H PRN Yasmin Harvey MD      diphenhydrAMINE-zinc acetate   Topical BID PRN Anjel Sagastume MD      escitalopram  20 mg Oral Daily La lFores MD      haloperidol  1 mg Oral Q6H PRN Yasmin Harvey MD      haloperidol  2.5 mg Oral Q4H PRN Max 4/day Yasmin Harvey MD      haloperidol  5 mg Oral Q4H PRN Max 4/day Yasmin Harvey MD      hydrocortisone   Topical 4x Daily PRN HOLLI Monge      hydrOXYzine HCL  100 mg Oral Q6H PRN Max 4/day Yasmin Harvey MD      Or    LORazepam  2 mg Intramuscular Q6H PRN Yasmin Harvey MD      hydrOXYzine HCL  25 mg Oral Q6H PRN Max 4/day Yasmin Harvey MD      ketoconazole  1 Application Topical Daily HOLLI Monge      lithium carbonate  1,200 mg Oral HS HOLLI Anderson      lithium carbonate  300 mg Oral Daily HOLLI Anderson      loxapine  70 mg Oral BID La Flores MD      melatonin  3 mg Oral HS Yasmin Harvey MD      mirtazapine  45 mg Oral HS La Flores MD      nicotine polacrilex  4 mg Oral Q2H PRN Yasmin Harvey MD      polyethylene glycol  17 g Oral Daily PRN Yasmin Harvey MD      polyethylene glycol  17 g Oral Daily HOLLI Monge      senna-docusate sodium  1 tablet Oral Daily PRN Yasmin Harvey MD      traZODone  50 mg Oral HS PRN Yasmin Harvey MD       white petrolatum-mineral oil   Topical TID PRN Anjel Sagastume MD       Continuous Infusions:   PRN Meds:.  acetaminophen    acetaminophen    acetaminophen    aluminum-magnesium hydroxide-simethicone    haloperidol lactate **AND** LORazepam **AND** benztropine    haloperidol lactate **AND** LORazepam **AND** benztropine    benztropine    bisacodyl    hydrOXYzine HCL **OR** diphenhydrAMINE    diphenhydrAMINE-zinc acetate    haloperidol    haloperidol    haloperidol    hydrocortisone    hydrOXYzine HCL **OR** LORazepam    hydrOXYzine HCL    nicotine polacrilex    polyethylene glycol    senna-docusate sodium    traZODone    white petrolatum-mineral oil  Allergies   Allergen Reactions    Pollen Extract Sneezing     I reviewed the Home Medication list in the chart.     History reviewed. No pertinent family history.    Social History     Socioeconomic History    Marital status: Single     Spouse name: Not on file    Number of children: Not on file    Years of education: Not on file    Highest education level: Not on file   Occupational History    Not on file   Tobacco Use    Smoking status: Every Day     Current packs/day: 0.25     Types: Cigarettes    Smokeless tobacco: Never    Tobacco comments:     Pt reports he does not want to quit and does not want information. He declines Quitline information   Vaping Use    Vaping status: Some Days    Substances: Nicotine   Substance and Sexual Activity    Alcohol use: Not Currently     Comment: pt reports he does not drink    Drug use: Not Currently    Sexual activity: Not Currently   Other Topics Concern    Not on file   Social History Narrative    Not on file     Social Determinants of Health     Financial Resource Strain: Medium Risk (2/27/2022)    Received from [facility] [employer]    Overall Financial Resource Strain (CARDIA)     Difficulty of Paying Living Expenses: Somewhat hard   Food Insecurity: No Food Insecurity (2/27/2022)    Received from [facility] [employer]    Hunger  Vital Sign     Worried About Running Out of Food in the Last Year: Never true     Ran Out of Food in the Last Year: Never true   Transportation Needs: No Transportation Needs (2/27/2022)    Received from [facility]    PRAPARE - Transportation     Lack of Transportation (Medical): No     Lack of Transportation (Non-Medical): No   Physical Activity: Sufficiently Active (2/27/2022)    Received from [facility]    Exercise Vital Sign     Days of Exercise per Week: 4 days     Minutes of Exercise per Session: 60 min   Stress: Stress Concern Present (2/27/2022)    Received from [facility]    Citizen of [place] [facility] [employer] - Occupational Stress Questionnaire     Feeling of Stress : Very much   Social Connections: Moderately Isolated (2/27/2022)    Received from [facility]    Social Connection and Isolation Panel [NHANES]     Frequency of Communication with Friends and Family: Three times a week     Frequency of Social Gatherings with Friends and Family: Twice a week     Attends Scientologist Services: 1 to 4 times per year     Active Member of Clubs or Organizations: No     Attends Club or Organization Meetings: Never     Marital Status: Never    Intimate Partner Violence: Not At Risk (2/27/2022)    Received from [facility]    Humiliation, Afraid, Rape, and Kick questionnaire     Fear of Current or Ex-Partner: No     Emotionally Abused: No     Physically Abused: No     Sexually Abused: No   Housing Stability: Low Risk  (2/27/2022)    Received from [facility]    Housing Stability Vital Sign     Unable to Pay for Housing in the Last Year: No     Number of Places Lived in the Last Year: 2     Unstable Housing in the Last Year: No       Review of Systems   Review of Systems   Constitutional:  Negative for chills and fever.   HENT:  Negative for facial swelling and sore throat.    Eyes:  Negative for visual disturbance.   Respiratory:  Negative for cough, chest tightness, shortness  of breath and wheezing.    Cardiovascular:  Negative for chest pain, palpitations and leg swelling.   Gastrointestinal:  Negative for abdominal pain, blood in stool, constipation, diarrhea, nausea and vomiting.   Endocrine: Negative for cold intolerance and heat intolerance.   Genitourinary:  Negative for decreased urine volume, difficulty urinating, dysuria and hematuria.   Musculoskeletal:  Negative for arthralgias, back pain and myalgias.   Skin:  Negative for rash.   Neurological:  Negative for dizziness, syncope, weakness and numbness.   Psychiatric/Behavioral:  Positive for agitation, confusion and hallucinations. Negative for behavioral problems and suicidal ideas. The patient is not nervous/anxious.       Vitals:    01/15/24 2001   BP: 140/94   Pulse: (!) 109   Resp:    Temp:    SpO2: 99%     I/O         01/14 0701  01/15 0700 01/15 0701 01/16 0700 01/16 0701 01/17 0700           Unmeasured Stool Occurrence 2 x 2 x           Weight (last 2 days)       Date/Time Weight    01/14/24 0740 117 (258.2)            Physical Exam  Constitutional: awake, alert and oriented, in no acute distress, no obvious deformities, obese male  Head: Normocephalic, without obvious abnormality, atraumatic  Eyes: conjunctivae clear and moist. Sclera anicteric. No xanthelasmas. Pupils equal bilaterally. Extraocular motions are full.  Ear nose mouth and throat: ears are symmetrical bilaterally, hearing appears to be equal bilaterally, no nasal discharge or epistaxis, oropharynx is clear with moist mucous membranes  Neck: Trachea is midline, neck is supple, no thyromegaly or significant lymphadenopathy, there is full range of motion.  Lungs: clear to auscultation bilaterally, no wheezes, no rales, no rhonchi, no accessory muscle use, breathing is nonlabored  Heart: regular rate and rhythm, S1, S2 normal, no murmur, no click, no rub and no gallop, no lower extremity edema  Abdomen: Obese, soft, non-tender; bowel sounds normal; no  "masses, no organomegaly  Psychiatric: Patient is oriented to time, place, person, mood/affect is negative for depression, anxiety, agitation, appears to have appropriate insight  Skin: Skin is warm, dry, intact. No obvious rashes or lesions on exposed extremities. Nail beds are pink with no cyanosis or clubbing.    Results from last 7 days   Lab Units 01/16/24  0613 01/15/24  1442 01/11/24  0619   WBC Thousand/uL 10.43* 12.61* 10.44*   HEMOGLOBIN g/dL 12.4 12.5 12.4   HEMATOCRIT % 42.0 40.6 41.3   PLATELETS Thousands/uL 294 283 297   NEUTROS PCT % 62 66 61   MONOS PCT % 10 10 9   EOS PCT % 3 3 3     Results from last 7 days   Lab Units 01/11/24  0619   POTASSIUM mmol/L 4.1   CHLORIDE mmol/L 102   CO2 mmol/L 27   BUN mg/dL 12   CREATININE mg/dL 0.90   CALCIUM mg/dL 9.4     Results from last 7 days   Lab Units 01/11/24  0619   POTASSIUM mmol/L 4.1   CHLORIDE mmol/L 102   CO2 mmol/L 27   BUN mg/dL 12   CREATININE mg/dL 0.90   CALCIUM mg/dL 9.4   ALK PHOS U/L 76   ALT U/L 50   AST U/L 23     No results found for: \"TROPONINT\"      Results from last 7 days   Lab Units 01/16/24  0614   CK TOTAL U/L 272                       I have personally reviewed the EKG, CXR and Telemetry images directly.      Patient Active Problem List    Diagnosis Date Noted    Vitamin B 12 deficiency 11/15/2023    Vitamin D deficiency 11/15/2023    T wave inversion in EKG 07/12/2023    GERD (gastroesophageal reflux disease) 06/29/2023    Medical clearance for psychiatric admission 06/29/2023    Schizoaffective disorder, bipolar type (HCC) 06/29/2023    Tobacco abuse 06/29/2023    Chronic idiopathic constipation 05/09/2019    Syringoma 08/08/2014       Portions of the record may have been created with voice recognition software. Occasional wrong word or \"sound a like\" substitutions may have occurred due to the inherent limitations of voice recognition software. Read the chart carefully and recognize, using context, where substitutions have " occurred.    Antwan Gutierrez DO, [facility], [facility]  1/16/2024 1:12 PM

## 2024-01-16 NOTE — PROGRESS NOTES
01/16/24 1224   Team Meeting   Meeting Type Daily Rounds   Team Members Present   Team Members Present Physician;Nurse;   Physician Team Member Mark   Nursing Team Member Earl   Care Management Team Member Miguelito   Patient/Family Present   Patient Present No   Patient's Family Present No     Patient currently holds 201 status. Patient denies all symptoms. Patient is medication and meal complaint. Patient to continue on all current scheduled medications. Patient discharge date and plan to be determined.

## 2024-01-16 NOTE — NURSING NOTE
Pt observed in milieu and dayroom. Pt is social with peers, cooperative and pleasant with staff. Pt is wearing clothing from home. Medication and meal compliant. Pt denies pain/SI/HI/VH, but reports AH. Pt does not feel he needs medication for the voices. EKG completed and sent to medical. No needs expressed this shift.

## 2024-01-16 NOTE — CASE MANAGEMENT
Writer obtained notice from sister that his address has been changed with social security, thus that should no longer be  barrier in obtaining PA benefit.

## 2024-01-16 NOTE — PROGRESS NOTES
Progress Note - Behavioral Health     Alberto Berumen 28 y.o. male MRN: [id number]   Unit/Bed#: [facility] 383-02 Encounter: [id number]    Documentation, nursing notes, medication reconciliation, labs, and vitals reviewed. Patient was seen for continuing care and reviewed with treatment team. No acute events over the past 24 hours.  Per nursing report, has been calm and cooperative, intermittently isolative to his room but more social in the evening.  Reports ongoing auditory hallucinations that he states today are manageable.    No medication changes over the past 24 hours. Continues to tolerate current medications with no adverse effects.     On evaluation today, he is seen today sitting in the hallway.  Continues with irritable edge, and admits to frustration with lack of progress.  He is aware where waiting cardiology follow-up before initiating Clozaril due to abnormal EKG.  Voices continued to threaten him and his family.  Depression waxes and wanes.  Appears flat and depressed and poorly motivated, feelings of hopelessness.    No suicidal ideation, plan, or intent. Ongoing perceptual disturbances and does not exhibit any symptoms of aimee on evaluation.    Psychiatric ROS:  Behavior over the last 24 hours: minimally improved  Sleep: slept off and on  Appetite: fair  Medication side effects: No   ROS: no complaints, all other systems are negative    Mental Status Evaluation:    Appearance:  marginal hygiene   Behavior:  cooperative, guarded   Speech:  scant   Mood:  depressed   Affect:  blunted   Thought Process:  decreased rate of thoughts   Associations: concrete associations   Thought Content:  no overt delusions   Perceptual Disturbances: auditory hallucinations   Risk Potential: Suicidal ideation - None  Homicidal ideation - None  Potential for aggression - No   Sensorium:  oriented to person, place, time/date, and situation   Memory:  recent and remote memory grossly intact   Consciousness:  alert and awake    Attention/Concentration: decreased concentration and decreased attention span   Insight:  limited   Judgment: limited   Gait/Station: normal gait/station, normal balance   Motor Activity: no abnormal movements     Vital signs in last 24 hours:    Temp:  [98.5 °F (36.9 °C)] 98.5 °F (36.9 °C)  HR:  [] 109  Resp:  [17] 17  BP: ()/(67-94) 140/94    Laboratory results: I have personally reviewed all pertinent laboratory/tests results    Results from the past 24 hours:   Recent Results (from the past 24 hour(s))   CBC and differential    Collection Time: 01/15/24  2:42 PM   Result Value Ref Range    WBC 12.61 (H) 4.31 - 10.16 Thousand/uL    RBC 4.98 3.88 - 5.62 Million/uL    Hemoglobin 12.5 12.0 - 17.0 g/dL    Hematocrit 40.6 36.5 - 49.3 %    MCV 82 82 - 98 fL    MCH 25.1 (L) 26.8 - 34.3 pg    MCHC 30.8 (L) 31.4 - 37.4 g/dL    RDW 13.7 11.6 - 15.1 %    MPV 9.3 8.9 - 12.7 fL    Platelets 283 149 - 390 Thousands/uL    nRBC 0 /100 WBCs    Neutrophils Relative 66 43 - 75 %    Immat GRANS % 1 0 - 2 %    Lymphocytes Relative 20 14 - 44 %    Monocytes Relative 10 4 - 12 %    Eosinophils Relative 3 0 - 6 %    Basophils Relative 0 0 - 1 %    Neutrophils Absolute 8.37 (H) 1.85 - 7.62 Thousands/µL    Immature Grans Absolute 0.11 0.00 - 0.20 Thousand/uL    Lymphocytes Absolute 2.53 0.60 - 4.47 Thousands/µL    Monocytes Absolute 1.24 (H) 0.17 - 1.22 Thousand/µL    Eosinophils Absolute 0.31 0.00 - 0.61 Thousand/µL    Basophils Absolute 0.05 0.00 - 0.10 Thousands/µL   CK    Collection Time: 01/15/24  2:42 PM   Result Value Ref Range    Total  (H) 39 - 308 U/L   C-reactive protein    Collection Time: 01/15/24  2:42 PM   Result Value Ref Range    CRP 9.8 (H) <3.0 mg/L   B-Type Natriuretic Peptide(BNP)    Collection Time: 01/15/24  2:42 PM   Result Value Ref Range    BNP 55 0 - 100 pg/mL   High Sensitivity Troponin I Random    Collection Time: 01/15/24  2:42 PM   Result Value Ref Range    HS TnI random 2 (L) 8 - 18 ng/L    ECG 12 lead    Collection Time: 01/15/24  4:54 PM   Result Value Ref Range    Ventricular Rate 102 BPM    Atrial Rate 102 BPM    MA Interval 144 ms    QRSD Interval 90 ms    QT Interval 314 ms    QTC Interval 409 ms    P Axis  degrees    QRS Axis 135 degrees    T Wave Axis 209 degrees   ECG 12 lead    Collection Time: 01/15/24  5:28 PM   Result Value Ref Range    Ventricular Rate 98 BPM    Atrial Rate 98 BPM    MA Interval 146 ms    QRSD Interval 88 ms    QT Interval 348 ms    QTC Interval 444 ms    P Axis 45 degrees    QRS Axis 47 degrees    T Wave Axis -4 degrees   ECG 12 lead    Collection Time: 01/15/24  5:29 PM   Result Value Ref Range    Ventricular Rate 100 BPM    Atrial Rate 100 BPM    MA Interval 184 ms    QRSD Interval 78 ms    QT Interval 346 ms    QTC Interval 446 ms    P Axis 52 degrees    QRS Axis 46 degrees    T Wave Axis -13 degrees   CBC and differential    Collection Time: 01/16/24  6:13 AM   Result Value Ref Range    WBC 10.43 (H) 4.31 - 10.16 Thousand/uL    RBC 5.15 3.88 - 5.62 Million/uL    Hemoglobin 12.4 12.0 - 17.0 g/dL    Hematocrit 42.0 36.5 - 49.3 %    MCV 82 82 - 98 fL    MCH 24.1 (L) 26.8 - 34.3 pg    MCHC 29.5 (L) 31.4 - 37.4 g/dL    RDW 13.5 11.6 - 15.1 %    MPV 9.9 8.9 - 12.7 fL    Platelets 294 149 - 390 Thousands/uL    nRBC 0 /100 WBCs    Neutrophils Relative 62 43 - 75 %    Immat GRANS % 1 0 - 2 %    Lymphocytes Relative 24 14 - 44 %    Monocytes Relative 10 4 - 12 %    Eosinophils Relative 3 0 - 6 %    Basophils Relative 0 0 - 1 %    Neutrophils Absolute 6.42 1.85 - 7.62 Thousands/µL    Immature Grans Absolute 0.09 0.00 - 0.20 Thousand/uL    Lymphocytes Absolute 2.55 0.60 - 4.47 Thousands/µL    Monocytes Absolute 1.00 0.17 - 1.22 Thousand/µL    Eosinophils Absolute 0.34 0.00 - 0.61 Thousand/µL    Basophils Absolute 0.03 0.00 - 0.10 Thousands/µL   High Sensitivity Troponin I Random    Collection Time: 01/16/24  6:13 AM   Result Value Ref Range    HS TnI random <2 (L) 8 - 18 ng/L    B-Type Natriuretic Peptide(BNP)    Collection Time: 01/16/24  6:14 AM   Result Value Ref Range    BNP 6 0 - 100 pg/mL   CK    Collection Time: 01/16/24  6:14 AM   Result Value Ref Range    Total  39 - 308 U/L   C-reactive protein    Collection Time: 01/16/24  6:14 AM   Result Value Ref Range    CRP 9.8 (H) <3.0 mg/L       Suicide/Homicide Risk Assessment:    Risk of Harm to Self:   Current Specific Risk Factors include: mental illness diagnosis, current psychotic symptoms  Protective Factors: no current suicidal ideation, ability to communicate with staff on the unit, able to contract for safety on the unit, taking medications as ordered on the unit  Based on today's assessment, Alberto presents the following risk of harm to self: minimal    Risk of Harm to Others:  Current Specific Risk Factors include: current psychotic symptoms  Protective Factors: no current homicidal ideation, able to communicate with staff on the unit, compliant with medications on the unit as ordered, willing to continue psychiatric treatment  Based on today's assessment, Alberto presents the following risk of harm to others: minimal    The following interventions are recommended: behavioral checks every 7 minutes, continued hospitalization on locked unit    Progress Toward Goals: limited improvement, poor motivation, continues to endorse psychotic symptoms    Assessment/Plan   Principal Problem:    Schizoaffective disorder, bipolar type (HCC)  Active Problems:    GERD (gastroesophageal reflux disease)    Medical clearance for psychiatric admission    Tobacco abuse    T wave inversion in EKG    Chronic idiopathic constipation    Vitamin B 12 deficiency    Vitamin D deficiency      Recommended Treatment:     Planned medication and treatment changes:    All current active medications have been reviewed  Encourage group therapy, milieu therapy and occupational therapy  Behavioral Health checks every 7 minutes  Awaiting cardiology consult  for clearance to start Clozaril    Continue all other medications the same    Current Facility-Administered Medications   Medication Dose Route Frequency Provider Last Rate    acetaminophen  650 mg Oral Q6H PRN Yasmin Harvey MD      acetaminophen  650 mg Oral Q4H PRN Yasmin Harvey MD      acetaminophen  975 mg Oral Q6H PRN Yasmin Harvey MD      aluminum-magnesium hydroxide-simethicone  30 mL Oral Q4H PRN Yasmin Harvey MD      haloperidol lactate  2.5 mg Intramuscular Q4H PRN Max 4/day Yasmin Harvey MD      And    LORazepam  1 mg Intramuscular Q4H PRN Max 4/day Yasmin Harvey MD      And    benztropine  0.5 mg Intramuscular Q4H PRN Max 4/day Yasmin Harvey MD      haloperidol lactate  5 mg Intramuscular Q4H PRN Max 4/day Yasmin Harvey MD      And    LORazepam  2 mg Intramuscular Q4H PRN Max 4/day Yasmin Harvey MD      And    benztropine  1 mg Intramuscular Q4H PRN Max 4/day Yasmin Harvey MD      benztropine  0.5 mg Oral BID La Flores MD      benztropine  1 mg Oral Q4H PRN Max 6/day Yasmin Harvey MD      bisacodyl  10 mg Rectal Daily PRN Yasmin Harvey MD      hydrOXYzine HCL  50 mg Oral Q6H PRN Max 4/day Yasmin Harvey MD      Or    diphenhydrAMINE  50 mg Intramuscular Q6H PRN Yasmin Harvey MD      diphenhydrAMINE-zinc acetate   Topical BID PRN Anjel Sagastume MD      escitalopram  20 mg Oral Daily La Flores MD      haloperidol  1 mg Oral Q6H PRN Yasmin Harvey MD      haloperidol  2.5 mg Oral Q4H PRN Max 4/day Yasmin Harvey MD      haloperidol  5 mg Oral Q4H PRN Max 4/day Yasmin Harvey MD      hydrocortisone   Topical 4x Daily PRN HOLLI Monge      hydrOXYzine HCL  100 mg Oral Q6H PRN Max 4/day Yasmin Harvey MD      Or    LORazepam  2 mg Intramuscular Q6H PRN Yasmin Harvey MD      hydrOXYzine HCL  25 mg Oral Q6H PRN Max 4/day Yasmin Harvey MD      ketoconazole  1 Application Topical Daily Eileen Holliday  HOLLI Jensen      lithium carbonate  1,200 mg Oral HS HOLLI Anderson      lithium carbonate  300 mg Oral Daily HOLLI Anderson      loxapine  70 mg Oral BID La Flores MD      melatonin  3 mg Oral HS Yasmin Harvey MD      mirtazapine  45 mg Oral HS La Flores MD      nicotine polacrilex  4 mg Oral Q2H PRN Yasmin Harvey MD      polyethylene glycol  17 g Oral Daily PRN Yasmin Harvey MD      polyethylene glycol  17 g Oral Daily HOLLI Monge      senna-docusate sodium  1 tablet Oral Daily PRN Yasmin Harvey MD      traZODone  50 mg Oral HS PRN Ysamin Harvey MD      white petrolatum-mineral oil   Topical TID PRN Anjel Sagastume MD       Risks / Benefits of Treatment:    Risks, benefits, and possible side effects of medications explained to patient and patient verbalizes understanding and agreement for treatment.    Counseling / Coordination of Care:    Patient's progress discussed with staff in treatment team meeting.  Medications, treatment progress and treatment plan reviewed with patient.    HOLLI Anderson 01/16/24

## 2024-01-17 PROCEDURE — 99232 SBSQ HOSP IP/OBS MODERATE 35: CPT | Performed by: PSYCHIATRY & NEUROLOGY

## 2024-01-17 RX ORDER — AMOXICILLIN 250 MG
1 CAPSULE ORAL 2 TIMES DAILY
Status: DISCONTINUED | OUTPATIENT
Start: 2024-01-17 | End: 2024-02-10

## 2024-01-17 RX ORDER — CLOZAPINE 25 MG/1
25 TABLET ORAL
Status: DISCONTINUED | OUTPATIENT
Start: 2024-01-17 | End: 2024-01-18

## 2024-01-17 RX ORDER — LITHIUM CARBONATE 450 MG
900 TABLET, EXTENDED RELEASE ORAL
Status: COMPLETED | OUTPATIENT
Start: 2024-01-17 | End: 2024-01-18

## 2024-01-17 RX ADMIN — ESCITALOPRAM OXALATE 20 MG: 10 TABLET ORAL at 08:09

## 2024-01-17 RX ADMIN — LOXAPINE 70 MG: 50 CAPSULE ORAL at 08:10

## 2024-01-17 RX ADMIN — SENNOSIDES AND DOCUSATE SODIUM 1 TABLET: 8.6; 5 TABLET ORAL at 11:48

## 2024-01-17 RX ADMIN — LITHIUM CARBONATE 300 MG: 300 TABLET, EXTENDED RELEASE ORAL at 08:09

## 2024-01-17 RX ADMIN — MIRTAZAPINE 45 MG: 30 TABLET, FILM COATED ORAL at 21:16

## 2024-01-17 RX ADMIN — NICOTINE POLACRILEX 4 MG: 4 GUM, CHEWING ORAL at 13:22

## 2024-01-17 RX ADMIN — MELATONIN TAB 3 MG 3 MG: 3 TAB at 21:16

## 2024-01-17 RX ADMIN — NICOTINE POLACRILEX 4 MG: 4 GUM, CHEWING ORAL at 08:44

## 2024-01-17 RX ADMIN — BENZTROPINE MESYLATE 0.5 MG: 0.5 TABLET ORAL at 17:37

## 2024-01-17 RX ADMIN — SENNOSIDES AND DOCUSATE SODIUM 1 TABLET: 8.6; 5 TABLET ORAL at 17:37

## 2024-01-17 RX ADMIN — NICOTINE POLACRILEX 4 MG: 4 GUM, CHEWING ORAL at 18:05

## 2024-01-17 RX ADMIN — KETOCONAZOLE 1 APPLICATION: 20 SHAMPOO, SUSPENSION TOPICAL at 08:10

## 2024-01-17 RX ADMIN — CLOZAPINE 25 MG: 25 TABLET ORAL at 21:16

## 2024-01-17 RX ADMIN — LOXAPINE 60 MG: 50 CAPSULE ORAL at 21:16

## 2024-01-17 RX ADMIN — POLYETHYLENE GLYCOL 3350 17 G: 17 POWDER, FOR SOLUTION ORAL at 08:09

## 2024-01-17 RX ADMIN — LITHIUM CARBONATE 900 MG: 450 TABLET, EXTENDED RELEASE ORAL at 21:16

## 2024-01-17 RX ADMIN — BENZTROPINE MESYLATE 0.5 MG: 0.5 TABLET ORAL at 08:10

## 2024-01-17 NOTE — PROGRESS NOTES
"Progress Note - Behavioral Health     Alberto Berumen 28 y.o. male MRN: [id number]   Unit/Bed#: [facility] 383-02 Encounter: [id number]    Documentation, nursing notes, medication reconciliation, labs, and vitals reviewed. Patient was seen for continuing care and reviewed with treatment team. No acute events over the past 24 hours.  Per nursing report, continues to state he has auditory hallucinations of voices telling him they were will harm him or his family.    No medication changes over the past 24 hours. Continues to tolerate current medications with no adverse effects.     On evaluation today, he has been out of his room more during the day.  Continues flat depressed and irritable.  Continues to endorse auditory hallucinations \"no change \".  Cardiology has cleared him to start Clozaril and he is made aware of same, to start tonight.  No suicidal ideation, plan, or intent.  Ongoing perceptual disturbances and does not exhibit any symptoms of aimee on evaluation.    Psychiatric ROS:  Behavior over the last 24 hours: unchanged  Sleep: slept off and on  Appetite: fair  Medication side effects: No   ROS: no complaints, all other systems are negative    Mental Status Evaluation:    Appearance:  marginal hygiene, dressed in hospital attire   Behavior:  guarded   Speech:  soft   Mood:  dysphoric   Affect:  constricted   Thought Process:  decreased rate of thoughts   Associations: concrete associations   Thought Content:  some paranoia   Perceptual Disturbances: auditory hallucinations threatening him still present, but less frequent   Risk Potential: Suicidal ideation - None  Homicidal ideation - None  Potential for aggression - No   Sensorium:  oriented to person, place, and time/date   Memory:  recent memory intact   Consciousness:  alert and awake   Attention/Concentration: decreased concentration and decreased attention span   Insight:  limited   Judgment: limited   Gait/Station: normal gait/station, normal balance "   Motor Activity: no abnormal movements     Vital signs in last 24 hours:    Temp:  [98.1 °F (36.7 °C)-98.6 °F (37 °C)] 98.6 °F (37 °C)  HR:  [101-104] 101  Resp:  [16-18] 18  BP: (146-148)/(85-96) 148/85    Laboratory results: I have personally reviewed all pertinent laboratory/tests results    Results from the past 24 hours: No results found for this or any previous visit (from the past 24 hour(s)).    Suicide/Homicide Risk Assessment:    Risk of Harm to Self:   Current Specific Risk Factors include: current psychotic symptoms  Protective Factors: no current suicidal ideation, ability to communicate with staff on the unit, able to contract for safety on the unit, taking medications as ordered on the unit  Based on today's assessment, Alberto presents the following risk of harm to self: minimal    Risk of Harm to Others:  Current Specific Risk Factors include: current psychotic symptoms  Protective Factors: no current homicidal ideation, able to communicate with staff on the unit, compliant with medications on the unit as ordered  Based on today's assessment, Alberto presents the following risk of harm to others: minimal    The following interventions are recommended: behavioral checks every 7 minutes, continued hospitalization on locked unit    Progress Toward Goals: no significant improvement    Assessment/Plan   Principal Problem:    Schizoaffective disorder, bipolar type (HCC)  Active Problems:    GERD (gastroesophageal reflux disease)    Medical clearance for psychiatric admission    Tobacco abuse    T wave inversion in EKG    Chronic idiopathic constipation    Vitamin B 12 deficiency    Vitamin D deficiency      Recommended Treatment:     Planned medication and treatment changes:    All current active medications have been reviewed  Encourage group therapy, milieu therapy and occupational therapy  Behavioral Health checks every 7 minutes  Decrease lithium to 300 mg this morning and 900 mg tonight and starting  tomorrow decrease again to 900 mg at bed, and will continue to taper off gradually due to ineffectiveness and changes on EKG  Decrease decrease loxapine to 60 mg twice daily and will taper off gradually  Start Clozaril 25 mg tonight for ongoing psychosis    He has ordered weekly EKG, weekly CBC, CK, C-reactive protein, BNP, troponin for monitoring due to clozaril    Current Facility-Administered Medications   Medication Dose Route Frequency Provider Last Rate    acetaminophen  650 mg Oral Q6H PRN Yasmin Harvey MD      acetaminophen  650 mg Oral Q4H PRN Yasmin Harvey MD      acetaminophen  975 mg Oral Q6H PRN Yasmin Harvey MD      aluminum-magnesium hydroxide-simethicone  30 mL Oral Q4H PRN Yasmin Harvey MD      haloperidol lactate  2.5 mg Intramuscular Q4H PRN Max 4/day Yasmin Harvey MD      And    LORazepam  1 mg Intramuscular Q4H PRN Max 4/day Yasmin Harvey MD      And    benztropine  0.5 mg Intramuscular Q4H PRN Max 4/day Yasmin Harvey MD      haloperidol lactate  5 mg Intramuscular Q4H PRN Max 4/day Yasmin Harvey MD      And    LORazepam  2 mg Intramuscular Q4H PRN Max 4/day Yasmin Harvey MD      And    benztropine  1 mg Intramuscular Q4H PRN Max 4/day Yasmin Harvey MD      benztropine  0.5 mg Oral BID La Flores MD      benztropine  1 mg Oral Q4H PRN Max 6/day Yasmin Harvey MD      bisacodyl  10 mg Rectal Daily PRN Yasmin Harvey MD      cloZAPine  25 mg Oral HS HOLLI Anderson      hydrOXYzine HCL  50 mg Oral Q6H PRN Max 4/day Yasmin Harvey MD      Or    diphenhydrAMINE  50 mg Intramuscular Q6H PRN Yasmin Harvey MD      diphenhydrAMINE-zinc acetate   Topical BID PRN Anjel Sagastume MD      escitalopram  20 mg Oral Daily La Flores MD      haloperidol  1 mg Oral Q6H PRN Yasmin Harvey MD      haloperidol  2.5 mg Oral Q4H PRN Max 4/day Yasmin Harvey MD      haloperidol  5 mg Oral Q4H PRN Max 4/day Yasmin Harvey MD       hydrocortisone   Topical 4x Daily PRN HOLLI Monge      hydrOXYzine HCL  100 mg Oral Q6H PRN Max 4/day Yasmin Harvey MD      Or    LORazepam  2 mg Intramuscular Q6H PRN Yasmin Harvey MD      hydrOXYzine HCL  25 mg Oral Q6H PRN Max 4/day Yasmin Harvey MD      ketoconazole  1 Application Topical Daily HOLLI Monge      lithium carbonate  900 mg Oral HS HOLLI Anderson      loxapine  60 mg Oral BID HOLLI Anderson      melatonin  3 mg Oral HS Yasmin Harvey MD      mirtazapine  45 mg Oral HS La Flores MD      nicotine polacrilex  4 mg Oral Q2H PRN Yasmin Harvey MD      polyethylene glycol  17 g Oral Daily PRN Yasmin Harvey MD      polyethylene glycol  17 g Oral Daily HOLLI Monge      senna-docusate sodium  1 tablet Oral Daily PRN Yasmin Harvey MD      senna-docusate sodium  1 tablet Oral BID La Flores MD      traZODone  50 mg Oral HS PRN Yasmin Harvey MD      white petrolatum-mineral oil   Topical TID PRN Anjel Sagastume MD       Risks / Benefits of Treatment:    Risks, benefits, and possible side effects of medications explained to patient and patient verbalizes understanding and agreement for treatment.  The patient has a history of at least 3 antipsychotic medication trials and at this time requires treatment with 2 antipsychotic agents (Clozaril and Loxitane) due to failed multiple trials of monotherapy.    Counseling / Coordination of Care:    Patient's progress discussed with staff in treatment team meeting.  Medications, treatment progress and treatment plan reviewed with patient.  Medication changes discussed with patient.    HOLLI Anderson 01/17/24

## 2024-01-17 NOTE — PROGRESS NOTES
01/17/24 1523   Team Meeting   Meeting Type Daily Rounds   Team Members Present   Team Members Present Physician;Nurse;   Physician Team Member Mark   Nursing Team Member Earl   Care Management Team Member Miguelito   Patient/Family Present   Patient Present No   Patient's Family Present No     Patient currently holds 201 status. Patient denies all symptoms. Provider to decrease lithium to 300 mg this morning and 900 mg tonight and starting tomorrow. Provider to decrease again to 900 mg at bed, and will continue to taper off gradually due to ineffectiveness and changes on EKG.Provider will also  decrease loxapine to 60 mg twice daily and will taper off gradually. Patient will start Clozaril 25 mg tonight for ongoing psychosis. Patient discharge date and time to be determined.

## 2024-01-17 NOTE — NURSING NOTE
Pt denies SI/HI/VH.  Pt states he continues to have AH of voices stating that they are going to hurt him or his family. Pt did not need/want a PRN at this time. Medication and meal compliant. Social with peers. Pt is calm and pleasant. Pt compliant with Lunch. No further concerns as of present. Plan of care ongoing.

## 2024-01-17 NOTE — NURSING NOTE
Pt is calm and cooperative upon approach. Visible in the milieu, social with staff and peers. Denies SI, HI, VH, and pain. Reports ongoing AH that are manageable. Compliant with HS medication and snack. Denies unmet needs/ concerns at this time.

## 2024-01-18 PROCEDURE — 99232 SBSQ HOSP IP/OBS MODERATE 35: CPT | Performed by: PSYCHIATRY & NEUROLOGY

## 2024-01-18 RX ORDER — LITHIUM CARBONATE 300 MG/1
300 TABLET, FILM COATED, EXTENDED RELEASE ORAL
Status: COMPLETED | OUTPATIENT
Start: 2024-01-20 | End: 2024-01-20

## 2024-01-18 RX ORDER — LITHIUM CARBONATE 300 MG/1
600 TABLET, FILM COATED, EXTENDED RELEASE ORAL
Status: COMPLETED | OUTPATIENT
Start: 2024-01-19 | End: 2024-01-19

## 2024-01-18 RX ORDER — CLOZAPINE 25 MG/1
50 TABLET ORAL
Status: DISCONTINUED | OUTPATIENT
Start: 2024-01-18 | End: 2024-01-19

## 2024-01-18 RX ORDER — LOXAPINE SUCCINATE 50 MG/1
50 TABLET ORAL 2 TIMES DAILY
Status: DISCONTINUED | OUTPATIENT
Start: 2024-01-18 | End: 2024-01-19

## 2024-01-18 RX ORDER — BENZTROPINE MESYLATE 0.5 MG/1
0.5 TABLET ORAL DAILY
Status: DISCONTINUED | OUTPATIENT
Start: 2024-01-19 | End: 2024-01-19

## 2024-01-18 RX ADMIN — LOXAPINE 60 MG: 50 CAPSULE ORAL at 08:25

## 2024-01-18 RX ADMIN — ESCITALOPRAM OXALATE 20 MG: 10 TABLET ORAL at 08:25

## 2024-01-18 RX ADMIN — MELATONIN TAB 3 MG 3 MG: 3 TAB at 21:16

## 2024-01-18 RX ADMIN — LOXAPINE 50 MG: 50 CAPSULE ORAL at 21:16

## 2024-01-18 RX ADMIN — SENNOSIDES AND DOCUSATE SODIUM 1 TABLET: 8.6; 5 TABLET ORAL at 08:26

## 2024-01-18 RX ADMIN — POLYETHYLENE GLYCOL 3350 17 G: 17 POWDER, FOR SOLUTION ORAL at 08:25

## 2024-01-18 RX ADMIN — BENZTROPINE MESYLATE 0.5 MG: 0.5 TABLET ORAL at 08:25

## 2024-01-18 RX ADMIN — NICOTINE POLACRILEX 4 MG: 4 GUM, CHEWING ORAL at 14:38

## 2024-01-18 RX ADMIN — NICOTINE POLACRILEX 4 MG: 4 GUM, CHEWING ORAL at 17:52

## 2024-01-18 RX ADMIN — LITHIUM CARBONATE 900 MG: 450 TABLET, EXTENDED RELEASE ORAL at 21:17

## 2024-01-18 RX ADMIN — SENNOSIDES AND DOCUSATE SODIUM 1 TABLET: 8.6; 5 TABLET ORAL at 17:47

## 2024-01-18 RX ADMIN — NICOTINE POLACRILEX 4 MG: 4 GUM, CHEWING ORAL at 08:25

## 2024-01-18 RX ADMIN — CLOZAPINE 50 MG: 25 TABLET ORAL at 21:24

## 2024-01-18 RX ADMIN — MIRTAZAPINE 45 MG: 30 TABLET, FILM COATED ORAL at 21:17

## 2024-01-18 NOTE — PROGRESS NOTES
01/18/24 155   Team Meeting   Meeting Type Daily Rounds   Team Members Present   Team Members Present Physician;Nurse;   Physician Team Member Mark   Nursing Team Member Gary   Care Management Team Member Miguelito   Patient/Family Present   Patient Present No   Patient's Family Present No     Patient currently holds 201 status. Patient reports continued AH but denies SI/HI/VH. Patient is medication and meal compliant. Patient to continue in Clozaril and come off Loxatine. Patient discharge date and time to be determined.

## 2024-01-18 NOTE — NURSING NOTE
Pt observed in milieu and dayroom. Pt is social with peers, cooperative and pleasant with staff. Pt is wearing clothing from home. Medication and meal compliant. Denies SI/HI/VH, or pain. Pt continues to have AH. No needs expressed this shift.

## 2024-01-18 NOTE — NURSING NOTE
Pt is calm and cooperative upon approach. Visible in the milieu for needs, observed using the pt phone. Denies SI, HI, VH, and pain. Reports ongoing AH that are manageable. Verbalizes hope that newly ordered clozapine will help with symptoms. Compliant with all other medication and snack. Denies unmet needs/ concerns at this time.

## 2024-01-18 NOTE — PROGRESS NOTES
Progress Note - Behavioral Health     Alberto Berumen 28 y.o. male MRN: [id number]   Unit/Bed#: [facility] 383-02 Encounter: [id number]    Documentation, nursing notes, medication reconciliation, labs, and vitals reviewed. Patient was seen for continuing care and reviewed with treatment team. No acute events over the past 24 hours.  Per nursing report, has been calm and cooperative, visible in the milieu and using the patient phone.  Reports ongoing hallucinations are manageable.    Medication changes over the past 24 hours: He has been started on Clozaril 25 mg.  Has had reductions in lithium and Loxitane. Continues to tolerate current medications with no adverse effects.     On evaluation today, he is again noted to be out of his room more during the day.  Maybe some improvement in his depression or he is benefiting from reduction of his Loxitane and lithium.  Admits to feeling more hopeful since starting Clozaril however, he continues to endorse depressed mood and minimal to no improvement in hallucinations.  Voices threatening himself or his family.  No suicidal ideation, plan, or intent. Ongoing perceptual disturbances and does not exhibit any symptoms of aimee on evaluation.    Psychiatric ROS:  Behavior over the last 24 hours: minimal improvement  Sleep: slept off and on  Appetite: fair  Medication side effects: No   ROS: no complaints, all other systems are negative    Mental Status Evaluation:    Appearance:  age appropriate   Behavior:  cooperative, calm   Speech:  normal rate, normal volume   Mood:  depressed   Affect:  flat   Thought Process:  goal directed   Associations: intact associations   Thought Content:  no overt delusions   Perceptual Disturbances: auditory hallucinations of voices threatening himself and his family   Risk Potential: Suicidal ideation - None  Homicidal ideation - None  Potential for aggression - No   Sensorium:  oriented to person, place, time/date, and situation   Memory:  recent and  remote memory grossly intact   Consciousness:  alert and awake   Attention/Concentration: decreased concentration and decreased attention span   Insight:  limited   Judgment: limited   Gait/Station: normal gait/station, normal balance   Motor Activity: no abnormal movements     Vital signs in last 24 hours:    Temp:  [98.4 °F (36.9 °C)-98.6 °F (37 °C)] 98.4 °F (36.9 °C)  HR:  [104-108] 104  Resp:  [16-18] 16  BP: (146-149)/(73-81) 146/73    Laboratory results: I have personally reviewed all pertinent laboratory/tests results    Results from the past 24 hours: No results found for this or any previous visit (from the past 24 hour(s)).    Suicide/Homicide Risk Assessment:    Risk of Harm to Self:   Current Specific Risk Factors include: current psychotic symptoms  Protective Factors: no current suicidal ideation, ability to communicate with staff on the unit, able to contract for safety on the unit, taking medications as ordered on the unit  Based on today's assessment, Alberto presents the following risk of harm to self: minimal    Risk of Harm to Others:  Current Specific Risk Factors include: current psychotic symptoms  Protective Factors: no current homicidal ideation, able to communicate with staff on the unit, psychotic symptoms are less prominent  Based on today's assessment, Alberto presents the following risk of harm to others: minimal    The following interventions are recommended: behavioral checks every 7 minutes, continued hospitalization on locked unit    Progress Toward Goals: limited improvement, mood is stabilizing gradually, still psychotic    Assessment/Plan   Principal Problem:    Schizoaffective disorder, bipolar type (HCC)  Active Problems:    GERD (gastroesophageal reflux disease)    Medical clearance for psychiatric admission    Tobacco abuse    T wave inversion in EKG    Chronic idiopathic constipation    Vitamin B 12 deficiency    Vitamin D deficiency      Recommended Treatment:     Planned  medication and treatment changes:    All current active medications have been reviewed  Encourage group therapy, milieu therapy and occupational therapy  Behavioral Health checks every 7 minutes  Increase Clozaril to 50 mg tonight  Decrease loxapine to 50 mg twice daily  Decrease to 900 mg tonight, then 600 mg Friday night, then 300 mg Saturday night, then DC    He is for weekly EKG and labs for Clozaril monitoring    Current Facility-Administered Medications   Medication Dose Route Frequency Provider Last Rate    acetaminophen  650 mg Oral Q6H PRN Yasmin Harvey MD      acetaminophen  650 mg Oral Q4H PRN Yasmin Harvey MD      acetaminophen  975 mg Oral Q6H PRN Yasmin Harvey MD      aluminum-magnesium hydroxide-simethicone  30 mL Oral Q4H PRN Yasmin Harvey MD      haloperidol lactate  2.5 mg Intramuscular Q4H PRN Max 4/day Yasmin Harvey MD      And    LORazepam  1 mg Intramuscular Q4H PRN Max 4/day Yasmin Harvey MD      And    benztropine  0.5 mg Intramuscular Q4H PRN Max 4/day Yasmin Harvey MD      haloperidol lactate  5 mg Intramuscular Q4H PRN Max 4/day Yasmin Harvey MD      And    LORazepam  2 mg Intramuscular Q4H PRN Max 4/day Yasmin Harvey MD      And    benztropine  1 mg Intramuscular Q4H PRN Max 4/day Yasmin Harvey MD      benztropine  0.5 mg Oral BID La Flores MD      benztropine  1 mg Oral Q4H PRN Max 6/day Yasmin Harvey MD      bisacodyl  10 mg Rectal Daily PRN Yasmin Harvey MD      cloZAPine  50 mg Oral HS HOLLI Anderson      hydrOXYzine HCL  50 mg Oral Q6H PRN Max 4/day Yasmin Harvey MD      Or    diphenhydrAMINE  50 mg Intramuscular Q6H PRN Yasmin Harvey MD      diphenhydrAMINE-zinc acetate   Topical BID PRN Anjel Sagastume MD      escitalopram  20 mg Oral Daily La Flores MD      haloperidol  1 mg Oral Q6H PRN Yasmin Harvey MD      haloperidol  2.5 mg Oral Q4H PRN Max 4/day Yasmin Harvey MD      haloperidol  5 mg Oral  Q4H PRN Max 4/day Yasmin Harvey MD      hydrocortisone   Topical 4x Daily PRN HOLLI Monge      hydrOXYzine HCL  100 mg Oral Q6H PRN Max 4/day Yasmin Harvey MD      Or    LORazepam  2 mg Intramuscular Q6H PRN Yasmin Harvey MD      hydrOXYzine HCL  25 mg Oral Q6H PRN Max 4/day Yasmin Harvey MD      ketoconazole  1 Application Topical Daily HOLLI Monge      lithium carbonate  900 mg Oral HS HOLLI Anderson      loxapine  50 mg Oral BID HOLLI Anderson      melatonin  3 mg Oral HS Yasmin Harvey MD      mirtazapine  45 mg Oral HS La Flores MD      nicotine polacrilex  4 mg Oral Q2H PRN Yasmin Harvey MD      polyethylene glycol  17 g Oral Daily PRN Yasmin Harvey MD      polyethylene glycol  17 g Oral Daily HOLLI Monge      senna-docusate sodium  1 tablet Oral Daily PRN Yasmin Harvey MD      senna-docusate sodium  1 tablet Oral BID La Flores MD      traZODone  50 mg Oral HS PRN Yasmin Harvey MD      white petrolatum-mineral oil   Topical TID PRN Anjel Sagastume MD       Risks / Benefits of Treatment:    Risks, benefits, and possible side effects of medications explained to patient and patient verbalizes understanding and agreement for treatment.  The patient has a history of at least 3 antipsychotic medication trials and at this time requires treatment with 2 antipsychotic agents (Clozaril and Loxitane) due to failed multiple trials of monotherapy.    Counseling / Coordination of Care:    Patient's progress discussed with staff in treatment team meeting.  Medications, treatment progress and treatment plan reviewed with patient.    HOLLI Anderson 01/18/24

## 2024-01-18 NOTE — CASE MANAGEMENT
Writer obtained phone call from Wanda, patients  from [facility] and [facility]. Writer explained the many obstacles she is facing obtaining MA benefit for patient. Wanda explained she would speak with management about sending someone to the hospital to work on MA benefit with patient.

## 2024-01-19 LAB
ATRIAL RATE: 110 BPM
ATRIAL RATE: 111 BPM
P AXIS: 38 DEGREES
P AXIS: 38 DEGREES
PR INTERVAL: 142 MS
PR INTERVAL: 142 MS
QRS AXIS: 47 DEGREES
QRS AXIS: 49 DEGREES
QRSD INTERVAL: 82 MS
QRSD INTERVAL: 88 MS
QT INTERVAL: 354 MS
QT INTERVAL: 354 MS
QTC INTERVAL: 479 MS
QTC INTERVAL: 481 MS
T WAVE AXIS: -23 DEGREES
T WAVE AXIS: -30 DEGREES
VENTRICULAR RATE: 110 BPM
VENTRICULAR RATE: 111 BPM

## 2024-01-19 PROCEDURE — 93005 ELECTROCARDIOGRAM TRACING: CPT

## 2024-01-19 PROCEDURE — 99232 SBSQ HOSP IP/OBS MODERATE 35: CPT | Performed by: PSYCHIATRY & NEUROLOGY

## 2024-01-19 PROCEDURE — 93010 ELECTROCARDIOGRAM REPORT: CPT | Performed by: STUDENT IN AN ORGANIZED HEALTH CARE EDUCATION/TRAINING PROGRAM

## 2024-01-19 RX ORDER — CLOZAPINE 25 MG/1
75 TABLET ORAL
Status: DISCONTINUED | OUTPATIENT
Start: 2024-01-19 | End: 2024-01-23

## 2024-01-19 RX ADMIN — SENNOSIDES AND DOCUSATE SODIUM 1 TABLET: 8.6; 5 TABLET ORAL at 17:21

## 2024-01-19 RX ADMIN — NICOTINE POLACRILEX 4 MG: 4 GUM, CHEWING ORAL at 15:06

## 2024-01-19 RX ADMIN — MIRTAZAPINE 45 MG: 30 TABLET, FILM COATED ORAL at 21:13

## 2024-01-19 RX ADMIN — LOXAPINE 50 MG: 50 CAPSULE ORAL at 08:28

## 2024-01-19 RX ADMIN — KETOCONAZOLE 1 APPLICATION: 20 SHAMPOO, SUSPENSION TOPICAL at 08:31

## 2024-01-19 RX ADMIN — NICOTINE POLACRILEX 4 MG: 4 GUM, CHEWING ORAL at 17:21

## 2024-01-19 RX ADMIN — CLOZAPINE 75 MG: 25 TABLET ORAL at 21:13

## 2024-01-19 RX ADMIN — POLYETHYLENE GLYCOL 3350 17 G: 17 POWDER, FOR SOLUTION ORAL at 08:28

## 2024-01-19 RX ADMIN — NICOTINE POLACRILEX 4 MG: 4 GUM, CHEWING ORAL at 08:28

## 2024-01-19 RX ADMIN — LOXAPINE 40 MG: 25 CAPSULE ORAL at 21:13

## 2024-01-19 RX ADMIN — BENZTROPINE MESYLATE 0.5 MG: 0.5 TABLET ORAL at 08:28

## 2024-01-19 RX ADMIN — LITHIUM CARBONATE 600 MG: 300 TABLET, EXTENDED RELEASE ORAL at 21:13

## 2024-01-19 RX ADMIN — ESCITALOPRAM OXALATE 20 MG: 10 TABLET ORAL at 08:28

## 2024-01-19 RX ADMIN — MELATONIN TAB 3 MG 3 MG: 3 TAB at 21:13

## 2024-01-19 RX ADMIN — SENNOSIDES AND DOCUSATE SODIUM 1 TABLET: 8.6; 5 TABLET ORAL at 08:28

## 2024-01-19 NOTE — PROGRESS NOTES
01/19/24 1456   Team Meeting   Meeting Type Daily Rounds   Team Members Present   Team Members Present Physician;Nurse;   Physician Team Member Mark   Nursing Team Member [facility] Team Member Miguelito   Patient/Family Present   Patient Present No   Patient's Family Present No     Patient currently holds 201 status. Patient reports AH but denies all other symptoms. Patient is medication and meal compliant. Patient to continue on Clozaril, staff to monitor. Patient discharge date and time to be determined.

## 2024-01-19 NOTE — PROGRESS NOTES
Progress Note - Behavioral Health     Alberto Berumen 28 y.o. male MRN: [id number]   Unit/Bed#: [facility] 383-02 Encounter: [id number]    Documentation, nursing notes, medication reconciliation, labs, and vitals reviewed. Patient was seen for continuing care and reviewed with treatment team. No acute events over the past 24 hours.  Per nursing report, continues calm and cooperative, out of his room more and using the phone.  Reports ongoing auditory hallucinations that are manageable.  Medication changes over the past 24 hours: Clozaril was increased to 50 mg last night and Loxitane was decreased to 50 mg twice daily.  Cogentin was reduced to 0.5 mg in the a.m. only-was initially started while on Haldol and no longer having tremor. Continues to tolerate current medications with no adverse effects.     On evaluation today, he is seen in his room, but has been out in the milieu more during the day.  Continues to endorse auditory hallucinations, threatening him and his family, and causing distress.  Has verbalized more hopeful now that he started on the Clozaril.    No suicidal ideation, plan, or intent. Denies perceptual disturbances and does not exhibit any symptoms of aimee on evaluation.    Psychiatric ROS:  Behavior over the last 24 hours: slowly improving  Sleep: slept off and on  Appetite: fair  Medication side effects: No   ROS: no complaints, all other systems are negative    Mental Status Evaluation:    Appearance:  marginal hygiene   Behavior:  cooperative, guarded   Speech:  scant, soft   Mood:  depressed   Affect:  blunted   Thought Process:  decreased rate of thoughts   Associations: concrete associations   Thought Content:  mild paranoia   Perceptual Disturbances: auditory hallucinations of voices threatening him and his family, or telling him he will go to hell   Risk Potential: Suicidal ideation - None  Homicidal ideation - None  Potential for aggression - No   Sensorium:  oriented to person, place, and  time/date   Memory:  recent and remote memory grossly intact   Consciousness:  alert and awake   Attention/Concentration: attention span and concentration are age appropriate   Insight:  impaired   Judgment: impaired   Gait/Station: normal gait/station, normal balance   Motor Activity: no abnormal movements     Vital signs in last 24 hours:    Temp:  [97.2 °F (36.2 °C)-97.8 °F (36.6 °C)] 97.2 °F (36.2 °C)  HR:  [104-111] 104  Resp:  [16] 16  BP: (139-163)/(88-90) 163/88    Laboratory results: I have personally reviewed all pertinent laboratory/tests results    Results from the past 24 hours:   Recent Results (from the past 24 hour(s))   ECG 12 lead    Collection Time: 01/19/24 10:30 AM   Result Value Ref Range    Ventricular Rate 111 BPM    Atrial Rate 111 BPM    WA Interval 142 ms    QRSD Interval 82 ms    QT Interval 354 ms    QTC Interval 481 ms    P Axis 38 degrees    QRS Axis 49 degrees    T Wave Axis -23 degrees   ECG 12 lead    Collection Time: 01/19/24 10:30 AM   Result Value Ref Range    Ventricular Rate 110 BPM    Atrial Rate 110 BPM    WA Interval 142 ms    QRSD Interval 88 ms    QT Interval 354 ms    QTC Interval 479 ms    P Axis 38 degrees    QRS Axis 47 degrees    T Wave Axis -30 degrees       Suicide/Homicide Risk Assessment:    Risk of Harm to Self:   Current Specific Risk Factors include: mental illness diagnosis, current psychotic symptoms  Protective Factors: no current suicidal ideation, ability to communicate with staff on the unit, able to contract for safety on the unit, taking medications as ordered on the unit  Based on today's assessment, Alberto presents the following risk of harm to self: minimal    Risk of Harm to Others:  Current Specific Risk Factors include: current psychotic symptoms  Protective Factors: no current homicidal ideation, mood is stabilizing, psychotic symptoms are less prominent  Based on today's assessment, Alberto presents the following risk of harm to others:  minimal    The following interventions are recommended: behavioral checks every 7 minutes, continued hospitalization on locked unit    Progress Toward Goals: minimal improvement, participates slightly less in milieu therapy, depression is gradual improving    Assessment/Plan   Principal Problem:    Schizoaffective disorder, bipolar type (HCC)  Active Problems:    GERD (gastroesophageal reflux disease)    Medical clearance for psychiatric admission    Tobacco abuse    T wave inversion in EKG    Chronic idiopathic constipation    Vitamin B 12 deficiency    Vitamin D deficiency      Recommended Treatment:     Planned medication and treatment changes:    All current active medications have been reviewed  Encourage group therapy, milieu therapy and occupational therapy  Behavioral Health checks every 7 minutes    Reviewed with clinical pharmacist results of EKG today and ongoing Clozaril titration, okay to increase Clozaril to 75 mg tonight and recheck EKG on Monday    Decrease Loxitane to 40 mg p.o. twice daily, as we cross titrate from Loxitane to Clozaril  For ongoing hallucinations-as loxitane was ineffective    Discontinue Cogentin, as no complaints of tremor.  Continue as needed dosing    Recheck EKG on Monday  Check weekly CBC, CK, CRP, troponin , BNP for Clozaril monitoring    Current Facility-Administered Medications   Medication Dose Route Frequency Provider Last Rate    acetaminophen  650 mg Oral Q6H PRN Yasmin Harvey MD      acetaminophen  650 mg Oral Q4H PRN Yasmin Harvey MD      acetaminophen  975 mg Oral Q6H PRN Yasmin Harvey MD      aluminum-magnesium hydroxide-simethicone  30 mL Oral Q4H PRN Yasmin Harvey MD      haloperidol lactate  2.5 mg Intramuscular Q4H PRN Max 4/day Yasmin Harvey MD      And    LORazepam  1 mg Intramuscular Q4H PRN Max 4/day Yasmin Harvey MD      And    benztropine  0.5 mg Intramuscular Q4H PRN Max 4/day Yasmin Harvey MD      haloperidol lactate  5 mg  Intramuscular Q4H PRN Max 4/day Yasmin Harvey MD      And    LORazepam  2 mg Intramuscular Q4H PRN Max 4/day Yasmin Harvey MD      And    benztropine  1 mg Intramuscular Q4H PRN Max 4/day Yasmin Harvey MD      benztropine  0.5 mg Oral Daily HOLLI Anderson      benztropine  1 mg Oral Q4H PRN Max 6/day Yasmin Harvey MD      bisacodyl  10 mg Rectal Daily PRN Yasmin Harvey MD      cloZAPine  50 mg Oral HS HOLLI Anderson      hydrOXYzine HCL  50 mg Oral Q6H PRN Max 4/day Yasmin Harvey MD      Or    diphenhydrAMINE  50 mg Intramuscular Q6H PRN Yasmin Harvey MD      diphenhydrAMINE-zinc acetate   Topical BID PRN Anjel Sagastume MD      escitalopram  20 mg Oral Daily La Flores MD      haloperidol  1 mg Oral Q6H PRN Yasmin Harvey MD      haloperidol  2.5 mg Oral Q4H PRN Max 4/day Yasmin Harvey MD      haloperidol  5 mg Oral Q4H PRN Max 4/day Yasmin Harvey MD      hydrocortisone   Topical 4x Daily PRN HOLLI Monge      hydrOXYzine HCL  100 mg Oral Q6H PRN Max 4/day Yasmin Harvey MD      Or    LORazepam  2 mg Intramuscular Q6H PRN Yasmin Harvey MD      hydrOXYzine HCL  25 mg Oral Q6H PRN Max 4/day Yasmin Harvey MD      ketoconazole  1 Application Topical Daily HOLLI Monge      [START ON 1/20/2024] lithium carbonate  300 mg Oral HS HOLLI Anderson      lithium carbonate  600 mg Oral HS HOLLI Anderson      loxapine  50 mg Oral BID HOLLI Anderson      melatonin  3 mg Oral HS Yasmin Harvey MD      mirtazapine  45 mg Oral HS La Flores MD      nicotine polacrilex  4 mg Oral Q2H PRN Yasmin Harvey MD      polyethylene glycol  17 g Oral Daily PRN Yasmin Harvey MD      polyethylene glycol  17 g Oral Daily HOLLI Monge      senna-docusate sodium  1 tablet Oral Daily PRN Yasmin Harvey MD      senna-docusate sodium  1 tablet Oral BID La Flores MD      traZODone  50 mg Oral HS PRN  Yasmin Harvey MD      white petrolatum-mineral oil   Topical TID PRN Anjel Sagastume MD       Risks / Benefits of Treatment:    Risks, benefits, and possible side effects of medications explained to patient and patient verbalizes understanding and agreement for treatment.    Counseling / Coordination of Care:    Patient's progress discussed with staff in treatment team meeting.  Medications, treatment progress and treatment plan reviewed with patient.    HOLLI Anderson 01/19/24

## 2024-01-19 NOTE — NURSING NOTE
Pt observed in milieu and dayroom. Pt is social with peers, cooperative and pleasant with staff. Pt has been sleeping most of shift. Pt is wearing clothing from home. Medication and meal compliant. Pt denies SI/HI/VH, or pain. Pt is still having AH, but does not feel he needs any PRN meds at this time. No needs expressed this shift.

## 2024-01-19 NOTE — NURSING NOTE
Pt calm/cooperative. Pleasant on approach. Pacing unit social. Denies si.hi.avh. denies unmet needs.    Compliant with evening meds.    Pt educated on the follow-up care regarding Clozaril. Pt was not confident about maintaining routine blood work required due to not having insurance.

## 2024-01-20 PROCEDURE — 99232 SBSQ HOSP IP/OBS MODERATE 35: CPT | Performed by: PSYCHIATRY & NEUROLOGY

## 2024-01-20 RX ADMIN — LOXAPINE 40 MG: 25 CAPSULE ORAL at 21:06

## 2024-01-20 RX ADMIN — LOXAPINE 40 MG: 25 CAPSULE ORAL at 08:13

## 2024-01-20 RX ADMIN — HYDROCORTISONE 1 APPLICATION: 1 OINTMENT TOPICAL at 11:09

## 2024-01-20 RX ADMIN — ESCITALOPRAM OXALATE 20 MG: 10 TABLET ORAL at 08:13

## 2024-01-20 RX ADMIN — MIRTAZAPINE 45 MG: 30 TABLET, FILM COATED ORAL at 21:06

## 2024-01-20 RX ADMIN — POLYETHYLENE GLYCOL 3350 17 G: 17 POWDER, FOR SOLUTION ORAL at 08:13

## 2024-01-20 RX ADMIN — MELATONIN TAB 3 MG 3 MG: 3 TAB at 21:06

## 2024-01-20 RX ADMIN — LITHIUM CARBONATE 300 MG: 300 TABLET, EXTENDED RELEASE ORAL at 21:06

## 2024-01-20 RX ADMIN — NICOTINE POLACRILEX 4 MG: 4 GUM, CHEWING ORAL at 15:56

## 2024-01-20 RX ADMIN — SENNOSIDES AND DOCUSATE SODIUM 1 TABLET: 8.6; 5 TABLET ORAL at 08:13

## 2024-01-20 RX ADMIN — CLOZAPINE 75 MG: 25 TABLET ORAL at 21:06

## 2024-01-20 RX ADMIN — NICOTINE POLACRILEX 4 MG: 4 GUM, CHEWING ORAL at 13:25

## 2024-01-20 RX ADMIN — NICOTINE POLACRILEX 4 MG: 4 GUM, CHEWING ORAL at 08:31

## 2024-01-20 RX ADMIN — SENNOSIDES AND DOCUSATE SODIUM 1 TABLET: 8.6; 5 TABLET ORAL at 17:32

## 2024-01-20 NOTE — NURSING NOTE
Patient was visible out of their room for a bit this evening, but spent most of the evening in their bed. Reports auditory hallucinations that the patient is going to hell and that they will kill the patient and their family. Patient received scheduled medications and encouraged to come to staff if medications do not help within an hour. Denies depression, anxiety, SI, or HI. Medication compliant. Denies any unmet needs or concerns at this time.

## 2024-01-20 NOTE — PROGRESS NOTES
Progress Note - Behavioral Health   Alberto Berumen 28 y.o. male MRN: [id number]  Unit/Bed#: [facility] 383-02 Encounter: [id number]      Subjective:  Patient evaluated this morning for continuity of care. Patient was discussed at length with nursing and treatment team. Per nursing, patient has reported auditory hallucinations that are violent towards himself and family. Patient was calm and cooperative during interview.  He reports experiencing auditory hallucinations of voices saying they want to kill him and his family.  He denies the voices being command auditory hallucinations.  Reports he has been experiencing hallucinations for over a year.  Alberto Berumen denies suicidal or homicidal ideation, plan, or intent.  Reports adequate sleep and appetite.  Reports that he spends his time walking, going to group, and reading the Bible.  He denies having physical complaints.  Denies adverse effects to medications.      Current Medications:  Current Facility-Administered Medications   Medication Dose Route Frequency Provider Last Rate    acetaminophen  650 mg Oral Q6H PRN Yasmin Harvey MD      acetaminophen  650 mg Oral Q4H PRN Yasmin Harvey MD      acetaminophen  975 mg Oral Q6H PRN Yasmin Harvey MD      aluminum-magnesium hydroxide-simethicone  30 mL Oral Q4H PRN Yasmin Harvey MD      haloperidol lactate  2.5 mg Intramuscular Q4H PRN Max 4/day Yasmin Harvey MD      And    LORazepam  1 mg Intramuscular Q4H PRN Max 4/day Yasmin Harvey MD      And    benztropine  0.5 mg Intramuscular Q4H PRN Max 4/day Yasmin Harvey MD      haloperidol lactate  5 mg Intramuscular Q4H PRN Max 4/day Yasmin Harvey MD      And    LORazepam  2 mg Intramuscular Q4H PRN Max 4/day Yasmin Harvey MD      And    benztropine  1 mg Intramuscular Q4H PRN Max 4/day Yasmin Harvey MD      benztropine  1 mg Oral Q4H PRN Max 6/day Yasmin Harvey MD      bisacodyl  10 mg Rectal Daily PRN Yasmin Harvey MD       cloZAPine  75 mg Oral HS Prisca Villafuerte, HOLLI      hydrOXYzine HCL  50 mg Oral Q6H PRN Max 4/day Yasmin Harvey MD      Or    diphenhydrAMINE  50 mg Intramuscular Q6H PRN Yasmin Harvey MD      diphenhydrAMINE-zinc acetate   Topical BID PRN Anjel Sagastume MD      escitalopram  20 mg Oral Daily La Flores MD      haloperidol  1 mg Oral Q6H PRN Yasmin Harvey MD      haloperidol  2.5 mg Oral Q4H PRN Max 4/day Yasmin Harvey MD      haloperidol  5 mg Oral Q4H PRN Max 4/day Yasmin Harvey MD      hydrocortisone   Topical 4x Daily PRN HOLLI Monge      hydrOXYzine HCL  100 mg Oral Q6H PRN Max 4/day Yasmin Harvey MD      Or    LORazepam  2 mg Intramuscular Q6H PRN Yasmin Harvey MD      hydrOXYzine HCL  25 mg Oral Q6H PRN Max 4/day Yasmin Harvey MD      lithium carbonate  300 mg Oral HS Prisca Villafuerte, HOLLI      loxapine  40 mg Oral BID HOLLI Anderson      melatonin  3 mg Oral HS Yasmin Harvey MD      mirtazapine  45 mg Oral HS La Flores MD      nicotine polacrilex  4 mg Oral Q2H PRN Yasmin Harvey MD      polyethylene glycol  17 g Oral Daily PRN Yasmin Harvey MD      polyethylene glycol  17 g Oral Daily HOLLI Monge      senna-docusate sodium  1 tablet Oral Daily PRN Yasmin Harvey MD      senna-docusate sodium  1 tablet Oral BID La Flores MD      traZODone  50 mg Oral HS PRN Yasmin Harvey MD      white petrolatum-mineral oil   Topical TID PRN Anjel Sagastume MD           Vital signs in last 24 hours:  Temp:  [97.9 °F (36.6 °C)-98.6 °F (37 °C)] 98.6 °F (37 °C)  HR:  [101] 101  Resp:  [16] 16  BP: (134-153)/(84-86) 134/84    Laboratory results:  I have personally reviewed all pertinent laboratory/tests results.    EKG   Lab Results   Component Value Date    VENTRATE 110 01/19/2024    ATRIALRATE 110 01/19/2024    PRINT 142 01/19/2024    QRSDINT 88 01/19/2024    QTINT 354 01/19/2024    QTCINT 479 01/19/2024    PAXIS 38  "01/19/2024    QRSAXIS 47 01/19/2024    TWAVEAXIS -30 01/19/2024           Mental Status Evaluation:  Appearance:  appears stated age and overweight    Behavior:  calm and cooperative   Speech:  normal rate and normal volume   Mood:  \"ok\"   Affect:  Constricted   Thought Process:  organized   Thought Content:  no delusions elicited   Perceptual Disturbances: Reports auditory hallucinations of voices saying they want to kill him and his family -denies command auditory hallucinations.  No visual hallucinations verbalized.   Risk Potential: Denies suicidal or homicidal ideation, plan, or intent   Sensorium:  person, place, and situation   Cognition:  appears grossly intact   Consciousness:  alert and awake   Attention: attention span and concentration were age appropriate   Insight:  limited   Judgment: limited   Gait/Station: not assessed   Motor Activity: no abnormal movements         Assessment/Plan   Principal Problem:    Schizoaffective disorder, bipolar type ([facility])  Active Problems:    GERD (gastroesophageal reflux disease)    Medical clearance for psychiatric admission    Tobacco abuse    T wave inversion in EKG    Chronic idiopathic constipation    Vitamin B 12 deficiency    Vitamin D deficiency        Recommended Treatment:   Continue to promote patient participation in therapeutic milieu.  Continue medical management per medicine.  Continue previously prescribed psychotropic medication regimen; see above.  QTc was 479 from EKG yesterday. Repeat EKG is for Monday morning 1/22/2024  Continue behavioral health checks q.7 minutes.   Continue vitals per behavioral health unit protocol.  Discharge date per primary team.      Risks, benefits and possible side effects of Medications:   Risks, benefits, and possible side effects of medications have been previously explained to the patient.  No new medication changes today.        Khoa Leon, DO      This note has been constructed using a voice recognition " system.    There may be translation, syntax,  or grammatical errors. If you have any questions, please contact the dictating provider.    This note was not shared with the patient due to reasonable likelihood of causing patient harm

## 2024-01-21 PROCEDURE — 99232 SBSQ HOSP IP/OBS MODERATE 35: CPT | Performed by: PSYCHIATRY & NEUROLOGY

## 2024-01-21 RX ADMIN — SENNOSIDES AND DOCUSATE SODIUM 1 TABLET: 8.6; 5 TABLET ORAL at 17:31

## 2024-01-21 RX ADMIN — CLOZAPINE 75 MG: 25 TABLET ORAL at 21:05

## 2024-01-21 RX ADMIN — LOXAPINE 40 MG: 25 CAPSULE ORAL at 08:42

## 2024-01-21 RX ADMIN — NICOTINE POLACRILEX 4 MG: 4 GUM, CHEWING ORAL at 15:37

## 2024-01-21 RX ADMIN — LOXAPINE 40 MG: 25 CAPSULE ORAL at 21:05

## 2024-01-21 RX ADMIN — SENNOSIDES AND DOCUSATE SODIUM 1 TABLET: 8.6; 5 TABLET ORAL at 08:42

## 2024-01-21 RX ADMIN — NICOTINE POLACRILEX 4 MG: 4 GUM, CHEWING ORAL at 13:13

## 2024-01-21 RX ADMIN — MELATONIN TAB 3 MG 3 MG: 3 TAB at 21:06

## 2024-01-21 RX ADMIN — POLYETHYLENE GLYCOL 3350 17 G: 17 POWDER, FOR SOLUTION ORAL at 08:41

## 2024-01-21 RX ADMIN — NICOTINE POLACRILEX 4 MG: 4 GUM, CHEWING ORAL at 09:17

## 2024-01-21 RX ADMIN — ESCITALOPRAM OXALATE 20 MG: 10 TABLET ORAL at 08:42

## 2024-01-21 RX ADMIN — NICOTINE POLACRILEX 4 MG: 4 GUM, CHEWING ORAL at 18:00

## 2024-01-21 RX ADMIN — MIRTAZAPINE 45 MG: 30 TABLET, FILM COATED ORAL at 21:06

## 2024-01-21 NOTE — PROGRESS NOTES
"Progress Note - Behavioral Health   Alberto Berumen 28 y.o. male MRN: [id number]  Unit/Bed#: [facility] 383-02 Encounter: [id number]      Subjective:  Patient evaluated this morning for continuity of care. Patient was discussed at length with nursing and treatment team. Per nursing, patient has reported experiencing auditory hallucinations saying they will kill him and his family, has been visible and social. Patient was calm and cooperative during interview.  He reports feeling \"okay\".  He reports experiencing auditory hallucinations saying they are going to kill him and his family.  He denies them being command auditory hallucinations in nature.  Reports feeling \"a little depression\".  Reports anxiety. Alberto Berumen denies suicidal or homicidal ideation, plan, or intent.      Current Medications:  Current Facility-Administered Medications   Medication Dose Route Frequency Provider Last Rate    acetaminophen  650 mg Oral Q6H PRN Yasmin Harvey MD      acetaminophen  650 mg Oral Q4H PRN Yasmin Harvey MD      acetaminophen  975 mg Oral Q6H PRN Yasmin Harvey MD      aluminum-magnesium hydroxide-simethicone  30 mL Oral Q4H PRN Yasmin Harvey MD      haloperidol lactate  2.5 mg Intramuscular Q4H PRN Max 4/day Yasmin Harvey MD      And    LORazepam  1 mg Intramuscular Q4H PRN Max 4/day Yasmin Harvey MD      And    benztropine  0.5 mg Intramuscular Q4H PRN Max 4/day Yasmin Harvey MD      haloperidol lactate  5 mg Intramuscular Q4H PRN Max 4/day Yasmin Harvey MD      And    LORazepam  2 mg Intramuscular Q4H PRN Max 4/day Yasmin Harvey MD      And    benztropine  1 mg Intramuscular Q4H PRN Max 4/day Yasmin Harvey MD      benztropine  1 mg Oral Q4H PRN Max 6/day Yasmin Harvey MD      bisacodyl  10 mg Rectal Daily PRN Yasmin Harvey MD      cloZAPine  75 mg Oral HS HOLLI Anderson      hydrOXYzine HCL  50 mg Oral Q6H PRN Max 4/day Yasmin Harvey MD      Or    diphenhydrAMINE  50 " "mg Intramuscular Q6H PRN Yasmin Harvey MD      diphenhydrAMINE-zinc acetate   Topical BID PRN Anjel Sagastume MD      escitalopram  20 mg Oral Daily La Flores MD      haloperidol  1 mg Oral Q6H PRN Yasmin Harvey MD      haloperidol  2.5 mg Oral Q4H PRN Max 4/day Yasmin Harvey MD      haloperidol  5 mg Oral Q4H PRN Max 4/day Yasmin Harvey MD      hydrocortisone   Topical 4x Daily PRN HOLLI Monge      hydrOXYzine HCL  100 mg Oral Q6H PRN Max 4/day Yasmin Harvey MD      Or    LORazepam  2 mg Intramuscular Q6H PRN Yasmin Harvey MD      hydrOXYzine HCL  25 mg Oral Q6H PRN Max 4/day Yasmin Harvey MD      loxapine  40 mg Oral BID HOLLI Anderson      melatonin  3 mg Oral HS Yasmin Harvey MD      mirtazapine  45 mg Oral HS La Flores MD      nicotine polacrilex  4 mg Oral Q2H PRN Yasmin Harvey MD      polyethylene glycol  17 g Oral Daily PRN Yasmin Harvey MD      polyethylene glycol  17 g Oral Daily HOLLI Monge      senna-docusate sodium  1 tablet Oral Daily PRN Yasmin Harvey MD      senna-docusate sodium  1 tablet Oral BID La Flores MD      traZODone  50 mg Oral HS PRN Yasmin Harvey MD      white petrolatum-mineral oil   Topical TID PRN Anjel Sagastume MD           Vital signs in last 24 hours:  Temp:  [97.2 °F (36.2 °C)-97.8 °F (36.6 °C)] 97.2 °F (36.2 °C)  HR:  [105-108] 105  Resp:  [16] 16  BP: (128-142)/(72-81) 128/81    Laboratory results:  I have personally reviewed all pertinent laboratory/tests results.          Mental Status Evaluation:  Appearance:  appears stated age and overweight    Behavior:  calm and cooperative   Speech:  normal rate and normal volume   Mood:  \"Okay\"   Affect:  Constricted   Thought Process:  organized   Thought Content:  no delusions elicited   Perceptual Disturbances: Reports auditory hallucinations of voices saying they are going to kill him and his family -denies command auditory " hallucinations.  No verbal hallucinations verbalized   Risk Potential: Denies suicidal or homicidal ideation, plan, or intent   Sensorium:  person, place, and situation   Cognition:  appears grossly intact   Consciousness:  alert and awake   Attention: attention span and concentration were age appropriate   Insight:  limited   Judgment: limited   Gait/Station: not assessed   Motor Activity: no abnormal movements         Assessment/Plan   Principal Problem:    Schizoaffective disorder, bipolar type (HCC)  Active Problems:    GERD (gastroesophageal reflux disease)    Medical clearance for psychiatric admission    Tobacco abuse    T wave inversion in EKG    Chronic idiopathic constipation    Vitamin B 12 deficiency    Vitamin D deficiency        Recommended Treatment:   Continue to promote patient participation in therapeutic milieu.  Continue medical management per medicine.  Continue previously prescribed psychotropic medication regimen; see above.  Continue behavioral health checks q.7 minutes.   Continue vitals per behavioral health unit protocol.  Discharge date per primary team.      Risks, benefits and possible side effects of Medications:   Risks, benefits, and possible side effects of medications have been previously explained to the patient.  No new medication changes today.        Khoa Leon, DO      This note has been constructed using a voice recognition system.    There may be translation, syntax,  or grammatical errors. If you have any questions, please contact the dictating provider.    This note was not shared with the patient due to reasonable likelihood of causing patient harm

## 2024-01-21 NOTE — NURSING NOTE
"Patient was quiet most of the evening, out for snack and seen walking a few laps around the unit by themself. When asked how their day was patient stated, \"regular\". Reports depression and auditory hallucinations that \"they will kill me and my family\". Denies anxiety, SI, or HI. Medication compliant. Retreated to bed on their own without any issues.   "

## 2024-01-21 NOTE — PLAN OF CARE
Problem: PSYCHOSIS  Goal: Will report no hallucinations or delusions  Description: Interventions:  - Administer medication as  ordered  - Every waking shifts and PRN assess for the presence of hallucinations and or delusions  - Assist with reality testing to support increasing orientation  - Assess if patient's hallucinations or delusions are encouraging self-harm or harm to others and intervene as appropriate  Outcome: Not Progressing     Problem: ANXIETY  Goal: Will report anxiety at manageable levels  Description: INTERVENTIONS:  - Administer medication as ordered  - Teach and encourage coping skills  - Provide emotional support  - Assess patient/family for anxiety and ability to cope  Outcome: Progressing     Problem: Depression  Goal: Treatment Goal: Demonstrate behavioral control of depressive symptoms, verbalize feelings of improved mood/affect, and adopt new coping skills prior to discharge  Outcome: Progressing  Goal: Verbalize thoughts and feelings  Description: Interventions:  - Assess and re-assess patient's level of risk   - Engage patient in 1:1 interactions, daily, for a minimum of 15 minutes   - Encourage patient to express feelings, fears, frustrations, hopes   Outcome: Progressing  Goal: Refrain from isolation  Description: Interventions:  - Develop a trusting relationship   - Encourage socialization   Outcome: Progressing  Goal: Refrain from self-neglect  Outcome: Progressing

## 2024-01-21 NOTE — NURSING NOTE
Pt denies SI and HI. Cites ongoing AH of negative/violent voices which are ongoing. Compliant with meds and meals. Observed with calm affect. Is behaviorally appropriate on unit, cooperative with unit rules and denies complaints. No evidence of adverse side effects from meds. Is visible in bedroom and common areas at select times. Current treatment plan effective.

## 2024-01-22 PROCEDURE — 99232 SBSQ HOSP IP/OBS MODERATE 35: CPT | Performed by: PSYCHIATRY & NEUROLOGY

## 2024-01-22 PROCEDURE — 93005 ELECTROCARDIOGRAM TRACING: CPT

## 2024-01-22 RX ADMIN — NICOTINE POLACRILEX 4 MG: 4 GUM, CHEWING ORAL at 08:33

## 2024-01-22 RX ADMIN — MIRTAZAPINE 45 MG: 30 TABLET, FILM COATED ORAL at 21:17

## 2024-01-22 RX ADMIN — CLOZAPINE 75 MG: 25 TABLET ORAL at 21:17

## 2024-01-22 RX ADMIN — MELATONIN TAB 3 MG 3 MG: 3 TAB at 21:17

## 2024-01-22 RX ADMIN — NICOTINE POLACRILEX 4 MG: 4 GUM, CHEWING ORAL at 17:46

## 2024-01-22 RX ADMIN — ESCITALOPRAM OXALATE 20 MG: 10 TABLET ORAL at 08:10

## 2024-01-22 RX ADMIN — POLYETHYLENE GLYCOL 3350 17 G: 17 POWDER, FOR SOLUTION ORAL at 08:10

## 2024-01-22 RX ADMIN — SENNOSIDES AND DOCUSATE SODIUM 1 TABLET: 8.6; 5 TABLET ORAL at 08:10

## 2024-01-22 RX ADMIN — NICOTINE POLACRILEX 4 MG: 4 GUM, CHEWING ORAL at 13:17

## 2024-01-22 RX ADMIN — LOXAPINE 40 MG: 25 CAPSULE ORAL at 08:10

## 2024-01-22 RX ADMIN — SENNOSIDES AND DOCUSATE SODIUM 1 TABLET: 8.6; 5 TABLET ORAL at 17:46

## 2024-01-22 RX ADMIN — LOXAPINE 40 MG: 25 CAPSULE ORAL at 21:16

## 2024-01-22 NOTE — CASE MANAGEMENT
Writer coordinated ICM meeting with patients  Wanda to attempt eliminating barriers for patient to obtain insurance. Wanda request for a virtual meeting on Thursday at 8:15 am with patient. Wanda explained she would like the link sent to [email or web address]. Writer then notified patient of meeting and the purpose.

## 2024-01-22 NOTE — TREATMENT TEAM
01/22/24 1522   Team Meeting   Meeting Type Daily Rounds   Team Members Present   Team Members Present Physician;Nurse;   Physician Team Member Mark   Nursing Team Member Earl   Care Management Team Member Miguelito   Patient/Family Present   Patient Present No   Patient's Family Present No     Patient currently holds 201 status. Patient reports AH, but denies all other symptoms. Patient is medication and meal compliant. Patient to continue on Clozaril, staff to monitor. Patient discharge date and plan to be determined.

## 2024-01-22 NOTE — PROGRESS NOTES
"Progress Note - Behavioral Health     Alberto Berumen 28 y.o. male MRN: [id number]   Unit/Bed#: [facility] 383-02 Encounter: [id number]    Documentation, nursing notes, medication reconciliation, labs, and vitals reviewed. Patient was seen for continuing care and reviewed with treatment team. No acute events over the past 24 hours.  Per nursing report, has been social and walking the unit with peers, more active in the evening.  Ongoing hallucinations \"they are going to kill me and my family \".  Cooperative with medications.  Unable to draw labs this morning, as he is a difficult stick.  He was resistive to EKG due to difficulty with lab work.  And states he will agree to have labs and EKG done after lunch.    No medication changes over the past 24 hours. Continues to tolerate current medications with no adverse effects.     On evaluation today, some minimal improvements in mood noted, more active and social over the past couple of weeks.  However reports no improvement in hallucinations.  Tolerating Clozaril with no noted adverse adverse effects.  He has occasional tachycardia, that has been present since admission.  Awaiting EKG today and labs, with plans to continue to titrate Clozaril as tolerated.    No suicidal ideation, plan, or intent. Ongoing perceptual disturbances and does not exhibit any symptoms of aimee on evaluation.    Psychiatric ROS:  Behavior over the last 24 hours: minimal improvement  Sleep: slept off and on  Appetite: fair  Medication side effects: No   ROS: reports tiredness, all other systems are negative    Mental Status Evaluation:    Appearance:  age appropriate   Behavior:  cooperative, guarded   Speech:  scant   Mood:  dysphoric   Affect:  constricted   Thought Process:  decreased rate of thoughts   Associations: concrete associations   Thought Content:  some paranoia   Perceptual Disturbances: auditory hallucinations of threatening voices   Risk Potential: Suicidal ideation - None  Homicidal " ideation - None  Potential for aggression - No   Sensorium:  oriented to person, place, and time/date   Memory:  recent and remote memory grossly intact   Consciousness:  alert and awake   Attention/Concentration: decreased concentration and decreased attention span   Insight:  limited   Judgment: limited   Gait/Station: normal gait/station, normal balance   Motor Activity: no abnormal movements     Vital signs in last 24 hours:    Temp:  [96.7 °F (35.9 °C)-97.1 °F (36.2 °C)] 96.7 °F (35.9 °C)  HR:  [] 96  Resp:  [18] 18  BP: (134-158)/(77-89) 134/77    Laboratory results: I have personally reviewed all pertinent laboratory/tests results    Results from the past 24 hours: No results found for this or any previous visit (from the past 24 hour(s)).    Suicide/Homicide Risk Assessment:    Risk of Harm to Self:   Current Specific Risk Factors include: mental illness diagnosis, current psychotic symptoms  Protective Factors: no current suicidal ideation, ability to communicate with staff on the unit, able to contract for safety on the unit, taking medications as ordered on the unit  Based on today's assessment, Alberto presents the following risk of harm to self: minimal    Risk of Harm to Others:  Current Specific Risk Factors include: current psychotic symptoms  Protective Factors: no current homicidal ideation, impulse control is improving, mood is stabilizing, psychotic symptoms are less prominent  Based on today's assessment, Alberto presents the following risk of harm to others: minimal    The following interventions are recommended: behavioral checks every 7 minutes, continued hospitalization on locked unit    Progress Toward Goals: no significant improvement, less depressed, still psychotic, still preoccupied    Assessment/Plan   Principal Problem:    Schizoaffective disorder, bipolar type ([facility])  Active Problems:    GERD (gastroesophageal reflux disease)    Medical clearance for psychiatric admission     Tobacco abuse    T wave inversion in EKG    Chronic idiopathic constipation    Vitamin B 12 deficiency    Vitamin D deficiency      Recommended Treatment:     Planned medication and treatment changes:    All current active medications have been reviewed  Encourage group therapy, milieu therapy and occupational therapy  Behavioral Health checks every 7 minutes  Recheck CBC/diff, CPK, C-reactive protein, BNP, and troponin today    Recheck ECG today    Continue current medications:     pending results of EKG continue to titrate Clozaril and reduce loxapine as tolerated    Current Facility-Administered Medications   Medication Dose Route Frequency Provider Last Rate    acetaminophen  650 mg Oral Q6H PRN Yasmin Harvey MD      acetaminophen  650 mg Oral Q4H PRN Yasmin Harvey MD      acetaminophen  975 mg Oral Q6H PRN Yasmin Harvey MD      aluminum-magnesium hydroxide-simethicone  30 mL Oral Q4H PRN Yasmin Harvey MD      haloperidol lactate  2.5 mg Intramuscular Q4H PRN Max 4/day Yasmin Harvey MD      And    LORazepam  1 mg Intramuscular Q4H PRN Max 4/day Yasmin Harvey MD      And    benztropine  0.5 mg Intramuscular Q4H PRN Max 4/day Yasmin Harvey MD      haloperidol lactate  5 mg Intramuscular Q4H PRN Max 4/day Yasmin Harvey MD      And    LORazepam  2 mg Intramuscular Q4H PRN Max 4/day Yasmin Harvey MD      And    benztropine  1 mg Intramuscular Q4H PRN Max 4/day Yasmin Harvey MD      benztropine  1 mg Oral Q4H PRN Max 6/day Yasmin Harvey MD      bisacodyl  10 mg Rectal Daily PRN Yasmin Harvey MD      cloZAPine  75 mg Oral HS HOLLI Anderson      hydrOXYzine HCL  50 mg Oral Q6H PRN Max 4/day Yasmin Harvey MD      Or    diphenhydrAMINE  50 mg Intramuscular Q6H PRN Yasmin aHrvey MD      diphenhydrAMINE-zinc acetate   Topical BID PRN Anjel Sagastume MD      escitalopram  20 mg Oral Daily La Flores MD      haloperidol  1 mg Oral Q6H PRN Yasmin Harvey MD       haloperidol  2.5 mg Oral Q4H PRN Max 4/day Yasmin Harvey MD      haloperidol  5 mg Oral Q4H PRN Max 4/day Yasmin Harvey MD      hydrocortisone   Topical 4x Daily PRN HOLLI Monge      hydrOXYzine HCL  100 mg Oral Q6H PRN Max 4/day Yasmin Harvey MD      Or    LORazepam  2 mg Intramuscular Q6H PRN Yasmin Harvey MD      hydrOXYzine HCL  25 mg Oral Q6H PRN Max 4/day Yasmin Harvey MD      loxapine  40 mg Oral BID HOLLI Anderson      melatonin  3 mg Oral HS Yasmin Harvey MD      mirtazapine  45 mg Oral HS La Flores MD      nicotine polacrilex  4 mg Oral Q2H PRN Yasmin Harvey MD      polyethylene glycol  17 g Oral Daily PRN Yasmin Harvey MD      polyethylene glycol  17 g Oral Daily HOLLI Monge      senna-docusate sodium  1 tablet Oral Daily PRN Yasmin Harvey MD      senna-docusate sodium  1 tablet Oral BID La Flores MD      traZODone  50 mg Oral HS PRN Yasmin Harvey MD      white petrolatum-mineral oil   Topical TID PRN Anjel Sagastume MD       Risks / Benefits of Treatment:    Risks, benefits, and possible side effects of medications explained to patient and patient verbalizes understanding and agreement for treatment.    Counseling / Coordination of Care:    Patient's progress discussed with staff in treatment team meeting.  Medications, treatment progress and treatment plan reviewed with patient.    HOLLI Anderson 01/22/24

## 2024-01-22 NOTE — NURSING NOTE
"Patient was visible and social this evening, observed walking laps around the unit bautista with a peer. Reports auditory hallucinations that \"they are going to kill me and my family\". Reassurance provided and patient encouraged to come to staff if voices worsen. Denies depression, anxiety, SI, or HI. Medication compliant. Made aware of scheduled lab work and EKG in the morning. Able to make needs known throughout the evening and denies any unmet needs or concerns at this time.   "

## 2024-01-22 NOTE — NURSING NOTE
"Pt awake and alert, isolative to room, visible for meals. Pt reports fair sleep. Denies SI/HI/VH at this time, continues to report +AH of voices telling him \"they're going to kill my family\". Pt endorses mild depression, states he feels depression is improving. Compliant with scheduled medications and meals, appetite good. Encouraged to attend groups. Denies any unmet needs at this time. Q7 minute safety checks maintained.   " Home

## 2024-01-22 NOTE — NURSING NOTE
Pt observed in milieu and dayroom. Pt is social with , pleasant and cooperative with staff. Pt is wearing clothing of his own. Medication and meal compliant. Denies SI/HI/AH/VH, or pain. No needs expressed this shift.

## 2024-01-22 NOTE — PLAN OF CARE
Problem: PSYCHOSIS  Goal: Will report no hallucinations or delusions  Description: Interventions:  - Administer medication as  ordered  - Every waking shifts and PRN assess for the presence of hallucinations and or delusions  - Assist with reality testing to support increasing orientation  - Assess if patient's hallucinations or delusions are encouraging self-harm or harm to others and intervene as appropriate  Outcome: Progressing     Problem: ANXIETY  Goal: Will report anxiety at manageable levels  Description: INTERVENTIONS:  - Administer medication as ordered  - Teach and encourage coping skills  - Provide emotional support  - Assess patient/family for anxiety and ability to cope  Outcome: Progressing     Problem: Depression  Goal: Treatment Goal: Demonstrate behavioral control of depressive symptoms, verbalize feelings of improved mood/affect, and adopt new coping skills prior to discharge  Outcome: Progressing  Goal: Verbalize thoughts and feelings  Description: Interventions:  - Assess and re-assess patient's level of risk   - Engage patient in 1:1 interactions, daily, for a minimum of 15 minutes   - Encourage patient to express feelings, fears, frustrations, hopes   Outcome: Progressing  Goal: Refrain from isolation  Description: Interventions:  - Develop a trusting relationship   - Encourage socialization   Outcome: Progressing  Goal: Refrain from self-neglect  Outcome: Progressing     Problem: DISCHARGE PLANNING  Goal: Discharge to home or other facility with appropriate resources  Description: INTERVENTIONS:  - Identify barriers to discharge w/patient and caregiver  - Arrange for needed discharge resources and transportation as appropriate  - Identify discharge learning needs (meds, wound care, etc.)  - Arrange for interpretive services to assist at discharge as needed  - Refer to Case Management Department for coordinating discharge planning if the patient needs post-hospital services based on  physician/advanced practitioner order or complex needs related to functional status, cognitive ability, or social support system  Outcome: Progressing

## 2024-01-23 LAB
ATRIAL RATE: 111 BPM
ATRIAL RATE: 112 BPM
BASOPHILS # BLD AUTO: 0.06 THOUSANDS/ÂΜL (ref 0–0.1)
BASOPHILS NFR BLD AUTO: 1 % (ref 0–1)
BNP SERPL-MCNC: 8 PG/ML (ref 0–100)
CARDIAC TROPONIN I PNL SERPL HS: 2 NG/L (ref 8–18)
CK SERPL-CCNC: 354 U/L (ref 39–308)
CRP SERPL QL: 12 MG/L
EOSINOPHIL # BLD AUTO: 0.37 THOUSAND/ÂΜL (ref 0–0.61)
EOSINOPHIL NFR BLD AUTO: 4 % (ref 0–6)
ERYTHROCYTE [DISTWIDTH] IN BLOOD BY AUTOMATED COUNT: 13.4 % (ref 11.6–15.1)
HCT VFR BLD AUTO: 41.1 % (ref 36.5–49.3)
HGB BLD-MCNC: 12.3 G/DL (ref 12–17)
IMM GRANULOCYTES # BLD AUTO: 0.02 THOUSAND/UL (ref 0–0.2)
IMM GRANULOCYTES NFR BLD AUTO: 0 % (ref 0–2)
LYMPHOCYTES # BLD AUTO: 2.69 THOUSANDS/ÂΜL (ref 0.6–4.47)
LYMPHOCYTES NFR BLD AUTO: 30 % (ref 14–44)
MCH RBC QN AUTO: 24.4 PG (ref 26.8–34.3)
MCHC RBC AUTO-ENTMCNC: 29.9 G/DL (ref 31.4–37.4)
MCV RBC AUTO: 81 FL (ref 82–98)
MONOCYTES # BLD AUTO: 0.86 THOUSAND/ÂΜL (ref 0.17–1.22)
MONOCYTES NFR BLD AUTO: 9 % (ref 4–12)
NEUTROPHILS # BLD AUTO: 5.13 THOUSANDS/ÂΜL (ref 1.85–7.62)
NEUTS SEG NFR BLD AUTO: 56 % (ref 43–75)
NRBC BLD AUTO-RTO: 0 /100 WBCS
P AXIS: 42 DEGREES
P AXIS: 48 DEGREES
PLATELET # BLD AUTO: 284 THOUSANDS/UL (ref 149–390)
PMV BLD AUTO: 9.5 FL (ref 8.9–12.7)
PR INTERVAL: 134 MS
PR INTERVAL: 138 MS
QRS AXIS: 56 DEGREES
QRS AXIS: 59 DEGREES
QRSD INTERVAL: 78 MS
QRSD INTERVAL: 82 MS
QT INTERVAL: 330 MS
QT INTERVAL: 334 MS
QTC INTERVAL: 448 MS
QTC INTERVAL: 455 MS
RBC # BLD AUTO: 5.05 MILLION/UL (ref 3.88–5.62)
T WAVE AXIS: -30 DEGREES
T WAVE AXIS: -71 DEGREES
VENTRICULAR RATE: 111 BPM
VENTRICULAR RATE: 112 BPM
WBC # BLD AUTO: 9.13 THOUSAND/UL (ref 4.31–10.16)

## 2024-01-23 PROCEDURE — 82550 ASSAY OF CK (CPK): CPT | Performed by: NURSE PRACTITIONER

## 2024-01-23 PROCEDURE — 99232 SBSQ HOSP IP/OBS MODERATE 35: CPT | Performed by: PSYCHIATRY & NEUROLOGY

## 2024-01-23 PROCEDURE — 93010 ELECTROCARDIOGRAM REPORT: CPT | Performed by: INTERNAL MEDICINE

## 2024-01-23 PROCEDURE — 83880 ASSAY OF NATRIURETIC PEPTIDE: CPT | Performed by: NURSE PRACTITIONER

## 2024-01-23 PROCEDURE — 85025 COMPLETE CBC W/AUTO DIFF WBC: CPT | Performed by: NURSE PRACTITIONER

## 2024-01-23 PROCEDURE — 86140 C-REACTIVE PROTEIN: CPT | Performed by: NURSE PRACTITIONER

## 2024-01-23 PROCEDURE — 83036 HEMOGLOBIN GLYCOSYLATED A1C: CPT | Performed by: NURSE PRACTITIONER

## 2024-01-23 PROCEDURE — 84484 ASSAY OF TROPONIN QUANT: CPT | Performed by: NURSE PRACTITIONER

## 2024-01-23 RX ORDER — CLOZAPINE 100 MG/1
100 TABLET ORAL
Status: DISCONTINUED | OUTPATIENT
Start: 2024-01-23 | End: 2024-01-24

## 2024-01-23 RX ADMIN — SENNOSIDES AND DOCUSATE SODIUM 1 TABLET: 8.6; 5 TABLET ORAL at 08:08

## 2024-01-23 RX ADMIN — POLYETHYLENE GLYCOL 3350 17 G: 17 POWDER, FOR SOLUTION ORAL at 08:08

## 2024-01-23 RX ADMIN — LOXAPINE 40 MG: 25 CAPSULE ORAL at 08:08

## 2024-01-23 RX ADMIN — MELATONIN TAB 3 MG 3 MG: 3 TAB at 21:15

## 2024-01-23 RX ADMIN — NICOTINE POLACRILEX 4 MG: 4 GUM, CHEWING ORAL at 12:55

## 2024-01-23 RX ADMIN — ESCITALOPRAM OXALATE 20 MG: 10 TABLET ORAL at 08:08

## 2024-01-23 RX ADMIN — MIRTAZAPINE 45 MG: 30 TABLET, FILM COATED ORAL at 21:15

## 2024-01-23 RX ADMIN — NICOTINE POLACRILEX 4 MG: 4 GUM, CHEWING ORAL at 17:34

## 2024-01-23 RX ADMIN — LOXAPINE 30 MG: 25 CAPSULE ORAL at 21:15

## 2024-01-23 RX ADMIN — SENNOSIDES AND DOCUSATE SODIUM 1 TABLET: 8.6; 5 TABLET ORAL at 17:33

## 2024-01-23 RX ADMIN — CLOZAPINE 100 MG: 100 TABLET ORAL at 21:15

## 2024-01-23 RX ADMIN — NICOTINE POLACRILEX 4 MG: 4 GUM, CHEWING ORAL at 08:27

## 2024-01-23 NOTE — PROGRESS NOTES
"Progress Note - Behavioral Health     Alberto Berumen 28 y.o. male MRN: [id number]   Unit/Bed#: U 383-02 Encounter: [id number]    Documentation, nursing notes, medication reconciliation, labs, and vitals reviewed. Patient was seen for continuing care and reviewed with treatment team. No acute events over the past 24 hours.  Per nursing report, continues to report voices \"the same \".  Cooperative on the unit, intermittently isolative to his bed.    No medication changes over the past 24 hours. Continues to tolerate current medications with no adverse effects.     On evaluation today, he is seen again in his room and in bed today napping at intervals.  Some days motivation and depression are better than others.  Continues to report ongoing auditory hallucinations, telling him they will kill him or his family or that he is going to hell.  Distressful.   No suicidal ideation, plan, or intent.  Ongoing perceptual disturbances and does not exhibit any symptoms of aimee on evaluation.    Psychiatric ROS:  Behavior over the last 24 hours: unchanged  Sleep: hypersomnia  Appetite: fair  Medication side effects: No   ROS: no complaints, all other systems are negative    Mental Status Evaluation:    Appearance:  marginal hygiene   Behavior:  guarded   Speech:  soft   Mood:  dysphoric   Affect:  constricted   Thought Process:  coherent   Associations: intact associations   Thought Content:  no overt delusions   Perceptual Disturbances: auditory hallucinations of voices threatening him and his family   Risk Potential: Suicidal ideation - None  Homicidal ideation - None  Potential for aggression - No   Sensorium:  oriented to person, place, and time/date   Memory:  recent and remote memory grossly intact   Consciousness:  alert and awake   Attention/Concentration: decreased concentration and decreased attention span   Insight:  limited   Judgment: limited   Gait/Station: normal gait/station, normal balance   Motor Activity: no " abnormal movements     Vital signs in last 24 hours:    Temp:  [98 °F (36.7 °C)-98.6 °F (37 °C)] 98.6 °F (37 °C)  HR:  [105] 105  Resp:  [18] 18  BP: (139-140)/(84-85) 139/84    Laboratory results: I have personally reviewed all pertinent laboratory/tests results    Results from the past 24 hours:   Recent Results (from the past 24 hour(s))   CBC and differential    Collection Time: 01/23/24  6:47 AM   Result Value Ref Range    WBC 9.13 4.31 - 10.16 Thousand/uL    RBC 5.05 3.88 - 5.62 Million/uL    Hemoglobin 12.3 12.0 - 17.0 g/dL    Hematocrit 41.1 36.5 - 49.3 %    MCV 81 (L) 82 - 98 fL    MCH 24.4 (L) 26.8 - 34.3 pg    MCHC 29.9 (L) 31.4 - 37.4 g/dL    RDW 13.4 11.6 - 15.1 %    MPV 9.5 8.9 - 12.7 fL    Platelets 284 149 - 390 Thousands/uL    nRBC 0 /100 WBCs    Neutrophils Relative 56 43 - 75 %    Immat GRANS % 0 0 - 2 %    Lymphocytes Relative 30 14 - 44 %    Monocytes Relative 9 4 - 12 %    Eosinophils Relative 4 0 - 6 %    Basophils Relative 1 0 - 1 %    Neutrophils Absolute 5.13 1.85 - 7.62 Thousands/µL    Immature Grans Absolute 0.02 0.00 - 0.20 Thousand/uL    Lymphocytes Absolute 2.69 0.60 - 4.47 Thousands/µL    Monocytes Absolute 0.86 0.17 - 1.22 Thousand/µL    Eosinophils Absolute 0.37 0.00 - 0.61 Thousand/µL    Basophils Absolute 0.06 0.00 - 0.10 Thousands/µL   CK    Collection Time: 01/23/24  6:47 AM   Result Value Ref Range    Total  (H) 39 - 308 U/L   B-Type Natriuretic Peptide(BNP)    Collection Time: 01/23/24  6:47 AM   Result Value Ref Range    BNP 8 0 - 100 pg/mL   C-reactive protein    Collection Time: 01/23/24  6:47 AM   Result Value Ref Range    CRP 12.0 (H) <3.0 mg/L   High Sensitivity Troponin I Random    Collection Time: 01/23/24  6:47 AM   Result Value Ref Range    HS TnI random 2 (L) 8 - 18 ng/L       Suicide/Homicide Risk Assessment:    Risk of Harm to Self:   Current Specific Risk Factors include: mental illness diagnosis, current depressive symptoms, current psychotic  symptoms  Protective Factors: no current suicidal ideation, ability to communicate with staff on the unit, able to contract for safety on the unit, taking medications as ordered on the unit  Based on today's assessment, Alberto presents the following risk of harm to self: minimal    Risk of Harm to Others:  Current Specific Risk Factors include: current psychotic symptoms  Protective Factors: no current homicidal ideation, able to communicate with staff on the unit, compliant with medications on the unit as ordered  Based on today's assessment, Alberto presents the following risk of harm to others: minimal    The following interventions are recommended: behavioral checks every 7 minutes, continued hospitalization on locked unit    Progress Toward Goals: no significant improvement, continues to endorse psychotic symptoms    Assessment/Plan   Principal Problem:    Schizoaffective disorder, bipolar type (HCC)  Active Problems:    GERD (gastroesophageal reflux disease)    Medical clearance for psychiatric admission    Tobacco abuse    T wave inversion in EKG    Chronic idiopathic constipation    Vitamin B 12 deficiency    Vitamin D deficiency      Recommended Treatment:     Planned medication and treatment changes:    All current active medications have been reviewed  Encourage group therapy, milieu therapy and occupational therapy  Behavioral Health checks every 7 minutes  Reviewed labs and EKG with clinical pharmacist.  Okay to increase clozapine  Increase Clozapine to 100 mg at bedtime  Decrease loxapine 30 mg twice daily  Check EKG, CBC, CK, BNP, CRP, troponin every Monday for Clozaril monitoring    Continue all other medications the same    Current Facility-Administered Medications   Medication Dose Route Frequency Provider Last Rate    acetaminophen  650 mg Oral Q6H PRN Yasmin Harvey MD      acetaminophen  650 mg Oral Q4H PRN Yasmin Harvey MD      acetaminophen  975 mg Oral Q6H PRN Yasmin Harvey MD       aluminum-magnesium hydroxide-simethicone  30 mL Oral Q4H PRN Yasmin Harvey MD      haloperidol lactate  2.5 mg Intramuscular Q4H PRN Max 4/day Yasmin Harvey MD      And    LORazepam  1 mg Intramuscular Q4H PRN Max 4/day Yasmin Harvey MD      And    benztropine  0.5 mg Intramuscular Q4H PRN Max 4/day Yasmin Harvey MD      haloperidol lactate  5 mg Intramuscular Q4H PRN Max 4/day Yasmin Harvey MD      And    LORazepam  2 mg Intramuscular Q4H PRN Max 4/day Yasmin Harvey MD      And    benztropine  1 mg Intramuscular Q4H PRN Max 4/day Yasmin Harvey MD      benztropine  1 mg Oral Q4H PRN Max 6/day Yasmin Harvey MD      bisacodyl  10 mg Rectal Daily PRN Yasmin Harvey MD      cloZAPine  75 mg Oral HS HOLLI Anderson      hydrOXYzine HCL  50 mg Oral Q6H PRN Max 4/day Yasmin Harvey MD      Or    diphenhydrAMINE  50 mg Intramuscular Q6H PRN Yasmin Harvey MD      diphenhydrAMINE-zinc acetate   Topical BID PRN Anjel Sagastume MD      escitalopram  20 mg Oral Daily La Flores MD      haloperidol  1 mg Oral Q6H PRN Yasmin Harvey MD      haloperidol  2.5 mg Oral Q4H PRN Max 4/day Yasmin Harvey MD      haloperidol  5 mg Oral Q4H PRN Max 4/day Yasmin Harvey MD      hydrocortisone   Topical 4x Daily PRN HOLLI Monge      hydrOXYzine HCL  100 mg Oral Q6H PRN Max 4/day Yasmin Harvey MD      Or    LORazepam  2 mg Intramuscular Q6H PRN Yasmin Harvey MD      hydrOXYzine HCL  25 mg Oral Q6H PRN Max 4/day Yasmin Harvey MD      loxapine  40 mg Oral BID STEVE AndersonNP      melatonin  3 mg Oral HS Yasmin Harvey MD      mirtazapine  45 mg Oral HS La Flores MD      nicotine polacrilex  4 mg Oral Q2H PRN Yasmin Harvey MD      polyethylene glycol  17 g Oral Daily PRJOHN Harvey MD      polyethylene glycol  17 g Oral Daily HOLLI Monge      senna-docusate sodium  1 tablet Oral Daily PRJOHN Harvey MD       senna-docusate sodium  1 tablet Oral BID La Flores MD      traZODone  50 mg Oral HS PRN Yasmin Harvey MD      white petrolatum-mineral oil   Topical TID PRN Anjel Sagastume MD       Risks / Benefits of Treatment:    Risks, benefits, and possible side effects of medications explained to patient and patient verbalizes understanding and agreement for treatment.    Counseling / Coordination of Care:    Patient's progress discussed with staff in treatment team meeting.  Medications, treatment progress and treatment plan reviewed with patient.    HOLLI Anderson 01/23/24

## 2024-01-23 NOTE — PROGRESS NOTES
01/23/24 1011   Team Meeting   Meeting Type Daily Rounds   Team Members Present   Team Members Present Physician;Nurse;   Physician Team Member Mark   Nursing Team Member Earl   Care Management Team Member Miguelito   Patient/Family Present   Patient Present No   Patient's Family Present No     Patient currently holds 201 status. Patient continued to report AH. Patient denies all other symptoms. Patient is medication and meal compliant. Patient to continue on Clozaril, staff continue to monitor. Patient discharge date and time to be determined.

## 2024-01-23 NOTE — NURSING NOTE
Pt observed in milieu and dayroom. Pt is social with peers, pleasant and cooperative with staff. Pt is wearing clothing of his own. Medication and meal compliant. Pt denies SI/HI/VH, or pain. Pt endorses still having AH of voices, but are manageable. Pt has been sleeping in bed intermittently throughout this shift. No needs expressed by pt.

## 2024-01-23 NOTE — NURSING NOTE
"Pt is calm and cooperative upon approach. Visible in the milieu, social with staff and peers. Denies SI, HI, VH, and pain. When asked \"How are the voices today?\" Pt responded \"They are still the same\". Compliant with HS medication and snack. Denies unmet needs/ concerns at this time.   "

## 2024-01-23 NOTE — PLAN OF CARE
Problem: PSYCHOSIS  Goal: Will report no hallucinations or delusions  Description: Interventions:  - Administer medication as  ordered  - Every waking shifts and PRN assess for the presence of hallucinations and or delusions  - Assist with reality testing to support increasing orientation  - Assess if patient's hallucinations or delusions are encouraging self-harm or harm to others and intervene as appropriate  Outcome: Progressing     Problem: ANXIETY  Goal: Will report anxiety at manageable levels  Description: INTERVENTIONS:  - Administer medication as ordered  - Teach and encourage coping skills  - Provide emotional support  - Assess patient/family for anxiety and ability to cope  Outcome: Progressing     Problem: Depression  Goal: Treatment Goal: Demonstrate behavioral control of depressive symptoms, verbalize feelings of improved mood/affect, and adopt new coping skills prior to discharge  Outcome: Progressing  Goal: Verbalize thoughts and feelings  Description: Interventions:  - Assess and re-assess patient's level of risk   - Engage patient in 1:1 interactions, daily, for a minimum of 15 minutes   - Encourage patient to express feelings, fears, frustrations, hopes   Outcome: Progressing  Goal: Refrain from isolation  Description: Interventions:  - Develop a trusting relationship   - Encourage socialization   Outcome: Progressing  Goal: Refrain from self-neglect  Outcome: Progressing     Problem: Ineffective Coping  Goal: Participates in unit activities  Description: Interventions:  - Provide therapeutic environment   - Provide required programming   - Redirect inappropriate behaviors   Outcome: Not Progressing

## 2024-01-23 NOTE — PLAN OF CARE
Problem: Ineffective Coping  Goal: Participates in unit activities  Description: Interventions:  - Provide therapeutic environment   - Provide required programming   - Redirect inappropriate behaviors   Outcome: Progressing     Patient appears to remain very selective about the groups/activities he attends

## 2024-01-24 PROBLEM — L83 ACANTHOSIS NIGRICANS: Status: ACTIVE | Noted: 2024-01-24

## 2024-01-24 LAB
EST. AVERAGE GLUCOSE BLD GHB EST-MCNC: 120 MG/DL
HBA1C MFR BLD: 5.8 %

## 2024-01-24 PROCEDURE — 99231 SBSQ HOSP IP/OBS SF/LOW 25: CPT | Performed by: NURSE PRACTITIONER

## 2024-01-24 PROCEDURE — 99232 SBSQ HOSP IP/OBS MODERATE 35: CPT | Performed by: PSYCHIATRY & NEUROLOGY

## 2024-01-24 RX ORDER — LOXAPINE SUCCINATE 10 MG/1
20 TABLET ORAL 2 TIMES DAILY
Status: DISCONTINUED | OUTPATIENT
Start: 2024-01-24 | End: 2024-01-25

## 2024-01-24 RX ADMIN — MIRTAZAPINE 45 MG: 30 TABLET, FILM COATED ORAL at 21:43

## 2024-01-24 RX ADMIN — NICOTINE POLACRILEX 4 MG: 4 GUM, CHEWING ORAL at 08:40

## 2024-01-24 RX ADMIN — MELATONIN TAB 3 MG 3 MG: 3 TAB at 21:43

## 2024-01-24 RX ADMIN — TRAZODONE HYDROCHLORIDE 50 MG: 50 TABLET ORAL at 21:43

## 2024-01-24 RX ADMIN — NICOTINE POLACRILEX 4 MG: 4 GUM, CHEWING ORAL at 15:54

## 2024-01-24 RX ADMIN — SENNOSIDES AND DOCUSATE SODIUM 1 TABLET: 8.6; 5 TABLET ORAL at 17:10

## 2024-01-24 RX ADMIN — NICOTINE POLACRILEX 4 MG: 4 GUM, CHEWING ORAL at 18:08

## 2024-01-24 RX ADMIN — POLYETHYLENE GLYCOL 3350 17 G: 17 POWDER, FOR SOLUTION ORAL at 08:17

## 2024-01-24 RX ADMIN — CLOZAPINE 125 MG: 100 TABLET ORAL at 21:43

## 2024-01-24 RX ADMIN — ESCITALOPRAM OXALATE 20 MG: 10 TABLET ORAL at 08:17

## 2024-01-24 RX ADMIN — LOXAPINE 30 MG: 25 CAPSULE ORAL at 08:17

## 2024-01-24 RX ADMIN — NICOTINE POLACRILEX 4 MG: 4 GUM, CHEWING ORAL at 13:18

## 2024-01-24 RX ADMIN — LOXAPINE 20 MG: 10 CAPSULE ORAL at 21:43

## 2024-01-24 RX ADMIN — SENNOSIDES AND DOCUSATE SODIUM 1 TABLET: 8.6; 5 TABLET ORAL at 08:17

## 2024-01-24 NOTE — PROGRESS NOTES
01/24/24 1538   Team Meeting   Meeting Type Daily Rounds   Team Members Present   Team Members Present Physician;;Nurse   Physician Team Member Mark   Nursing Team Member Earl   Care Management Team Member Miguelito   Patient/Family Present   Patient Present No   Patient's Family Present No     Patient currently holds 201 status. Patient reports AH but denies all other symptoms. Patient is medication and meal compliant. Patient to continue on Clozaril, staff to continue monitoring level. Provider to decrease dose of Loxitane. Patient discharge date and time to be determined.

## 2024-01-24 NOTE — NURSING NOTE
"Pt is calm and cooperative upon approach. Visible in the milieu, social with staff and peers. Denies SI, HI, VH, and pain. Continues to report ongoing AH. When asked \"Has the clozapine been helping?\" Pt responded \"Not really\". Compliant with all medication and snack. Denies unmet needs/ concerns at this time.   "

## 2024-01-24 NOTE — PROGRESS NOTES
[facility]  Progress Note  Name: Alberto Berumen I  MRN: [id number]  Unit/Bed#: -02 I Date of Admission: 2023   Date of Service: 2024 I Hospital Day: 71    Assessment/Plan   Acanthosis nigricans  Assessment & Plan  Suspecting as patient has dark, velvety patches in body folds on chest, back, and neck   No pain or itching   No improvement despite hydration, hydrocortisone cream, or ketoconazole body wash   Recommend weight loss as BMI is 40.3  Repeat A1c              VTE Pharmacologic Prophylaxis:   Low Risk (Score 0-2) - Encourage Ambulation.    Mobility:      HLM Goal achieved. Continue to encourage appropriate mobility.    Patient Centered Rounds: I performed bedside rounds with nursing staff today.   Discussions with Specialists or Other Care Team Provider: nursing     Education and Discussions with Family / Patient:  patient .     Total Time Spent on Date of Encounter in care of patient:  mins. This time was spent on one or more of the following: performing physical exam; counseling and coordination of care; obtaining or reviewing history; documenting in the medical record; reviewing/ordering tests, medications or procedures; communicating with other healthcare professionals and discussing with patient's family/caregivers.    Current Length of Stay: 71 day(s)  Current Patient Status: Inpatient Psych   Certification Statement: The patient will continue to require additional inpatient hospital stay due to IPU  Discharge Plan: [facility] is following this patient on consult. They are medically stable for discharge when deemed appropriate by primary service.    Code Status: Level 1 - Full Code    Subjective:   Patient seen and examined at bedside. Reports ongoing rash to chest and back. Denies pain, discomfort, itching. Reports no improvement despite body wash and creams. No Cp, SOB. Advised to lose weight.     Objective:     Vitals:   Temp (24hrs), Av.7 °F (36.5 °C),  Min:97.6 °F (36.4 °C), Max:97.8 °F (36.6 °C)    Temp:  [97.6 °F (36.4 °C)-97.8 °F (36.6 °C)] 97.8 °F (36.6 °C)  HR:  [101-104] 104  Resp:  [16-17] 17  BP: (142-150)/(88-95) 142/88  SpO2:  [99 %-100 %] 99 %  Body mass index is 40.31 kg/m².     Input and Output Summary (last 24 hours):   No intake or output data in the 24 hours ending 01/24/24 1433    Physical Exam:   Physical Exam  Vitals and nursing note reviewed.   Constitutional:       General: He is not in acute distress.     Appearance: He is obese. He is not toxic-appearing or diaphoretic.   HENT:      Head: Normocephalic.      Mouth/Throat:      Mouth: Mucous membranes are moist.   Eyes:      Conjunctiva/sclera: Conjunctivae normal.   Cardiovascular:      Rate and Rhythm: Normal rate.   Pulmonary:      Effort: Pulmonary effort is normal.      Breath sounds: Normal breath sounds.   Abdominal:      General: Bowel sounds are normal.      Palpations: Abdomen is soft.   Musculoskeletal:         General: Normal range of motion.      Cervical back: Normal range of motion.      Right lower leg: No edema.      Left lower leg: No edema.   Skin:     General: Skin is warm and dry.      Capillary Refill: Capillary refill takes less than 2 seconds.      Comments: Dark, velvety patches on trunk and neck    Neurological:      Mental Status: He is alert and oriented to person, place, and time. Mental status is at baseline.   Psychiatric:         Mood and Affect: Mood is depressed. Affect is flat.         Speech: Speech normal.         Behavior: Behavior is cooperative.          Additional Data:     Labs:  Results from last 7 days   Lab Units 01/23/24  0647   WBC Thousand/uL 9.13   HEMOGLOBIN g/dL 12.3   HEMATOCRIT % 41.1   PLATELETS Thousands/uL 284   NEUTROS PCT % 56   LYMPHS PCT % 30   MONOS PCT % 9   EOS PCT % 4                           Lines/Drains:  Invasive Devices       None                         Imaging: No pertinent imaging reviewed.    Recent Cultures (last 7  days):         Last 24 Hours Medication List:   Current Facility-Administered Medications   Medication Dose Route Frequency Provider Last Rate    acetaminophen  650 mg Oral Q6H PRN Yasmin Harvey MD      acetaminophen  650 mg Oral Q4H PRN Yasmin Harvey MD      acetaminophen  975 mg Oral Q6H PRN Yasmin Harvey MD      aluminum-magnesium hydroxide-simethicone  30 mL Oral Q4H PRN Yasmin Harvey MD      haloperidol lactate  2.5 mg Intramuscular Q4H PRN Max 4/day Yasmin Harvey MD      And    LORazepam  1 mg Intramuscular Q4H PRN Max 4/day Yasmin Harvey MD      And    benztropine  0.5 mg Intramuscular Q4H PRN Max 4/day Yasmin Harvey MD      haloperidol lactate  5 mg Intramuscular Q4H PRN Max 4/day Yasmin Harvey MD      And    LORazepam  2 mg Intramuscular Q4H PRN Max 4/day Yasmin Harvey MD      And    benztropine  1 mg Intramuscular Q4H PRN Max 4/day Yasmin Harvey MD      benztropine  1 mg Oral Q4H PRN Max 6/day Yasmin Harvey MD      bisacodyl  10 mg Rectal Daily PRN Yasmin Harvey MD      cloZAPine  125 mg Oral HS HOLLI Anderson      hydrOXYzine HCL  50 mg Oral Q6H PRN Max 4/day Yasmin Harvey MD      Or    diphenhydrAMINE  50 mg Intramuscular Q6H PRN Yasmin Harvey MD      diphenhydrAMINE-zinc acetate   Topical BID PRN Anjel Sagastume MD      escitalopram  20 mg Oral Daily La Flores MD      haloperidol  1 mg Oral Q6H PRN Yasmin Harvey MD      haloperidol  2.5 mg Oral Q4H PRN Max 4/day Yasmin Harvey MD      haloperidol  5 mg Oral Q4H PRN Max 4/day Yasmin Harvey MD      hydrocortisone   Topical 4x Daily PRN HOLLI Monge      hydrOXYzine HCL  100 mg Oral Q6H PRN Max 4/day Yasmin Harvey MD      Or    LORazepam  2 mg Intramuscular Q6H PRN Yasmin Harvey MD      hydrOXYzine HCL  25 mg Oral Q6H PRN Max 4/day Yasmin Harvey MD      loxapine  20 mg Oral BID HOLLI Anderson      melatonin  3 mg Oral HS Yasmin Harvey MD       mirtazapine  45 mg Oral HS La Flores MD      nicotine polacrilex  4 mg Oral Q2H PRN Yasmin Harvey MD      polyethylene glycol  17 g Oral Daily PRN Yasmin Harvey MD      polyethylene glycol  17 g Oral Daily HOLLI Monge      senna-docusate sodium  1 tablet Oral Daily PRN Yasmin Harvey MD      senna-docusate sodium  1 tablet Oral BID La Flores MD      traZODone  50 mg Oral HS PRN Yasmin Harvey MD      white petrolatum-mineral oil   Topical TID PRN Anjel Sagastume MD          Today, Patient Was Seen By: HOLLI Monge    **Please Note: This note may have been constructed using a voice recognition system.**

## 2024-01-24 NOTE — PROGRESS NOTES
Progress Note - Behavioral Health     Alberto Berumen 28 y.o. male MRN: [id number]   Unit/Bed#: [facility] 383-02 Encounter: [id number]    Documentation, nursing notes, medication reconciliation, labs, and vitals reviewed. Patient was seen for continuing care and reviewed with treatment team. No acute events over the past 24 hours.  Per nursing report, continues, cooperative.  More visible and social with peers.  Still endorses having auditory hallucinations but states they are manageable..    Medication changes over the past 24 hours: Continue to cross titrate , increasing Clozaril and tapering off of loxapine. Continues to tolerate current medications with no adverse effects.     On evaluation today, he is seen pacing the unit and did attend a group this morning.  Otherwise continues to report no change in his auditory hallucinations, voices threatening him and his family and causing him distress.  Also reports no improvement in his depression although he does appear to be more active on the unit.  No suicidal ideation, plan, or intent. Ongoing perceptual disturbances and does not exhibit any symptoms of aimee on evaluation.    Psychiatric ROS:  Behavior over the last 24 hours: unchanged  Sleep: slept off and on  Appetite: fair  Medication side effects: No   ROS: no complaints, all other systems are negative    Mental Status Evaluation:    Appearance:  marginal hygiene   Behavior:  pleasant, cooperative   Speech:  normal rate, normal volume   Mood:  depressed   Affect:  flat   Thought Process:  decreased rate of thoughts   Associations: concrete associations   Thought Content:  mild paranoia   Perceptual Disturbances: auditory hallucinations   Risk Potential: Suicidal ideation - None  Homicidal ideation - None  Potential for aggression - No   Sensorium:  oriented to person, place, time/date, and situation   Memory:  recent and remote memory grossly intact   Consciousness:  alert and awake   Attention/Concentration:  decreased concentration and decreased attention span   Insight:  limited   Judgment: limited   Gait/Station: normal gait/station, normal balance   Motor Activity: no abnormal movements     Vital signs in last 24 hours:    Temp:  [97.6 °F (36.4 °C)-97.8 °F (36.6 °C)] 97.8 °F (36.6 °C)  HR:  [101-104] 104  Resp:  [16-17] 17  BP: (142-150)/(88-95) 142/88    Laboratory results: I have personally reviewed all pertinent laboratory/tests results    Results from the past 24 hours: No results found for this or any previous visit (from the past 24 hour(s)).    Suicide/Homicide Risk Assessment:    Risk of Harm to Self:   Current Specific Risk Factors include: current depressive symptoms, current psychotic symptoms  Protective Factors: no current suicidal ideation, ability to communicate with staff on the unit, able to contract for safety on the unit, taking medications as ordered on the unit  Based on today's assessment, Alberto presents the following risk of harm to self: minimal    Risk of Harm to Others:  Current Specific Risk Factors include: current psychotic symptoms  Protective Factors: no current homicidal ideation, able to communicate with staff on the unit, improved impulse control  Based on today's assessment, Alberto presents the following risk of harm to others: minimal    The following interventions are recommended: behavioral checks every 7 minutes, continued hospitalization on locked unit    Progress Toward Goals: no significant improvement, still depressed, still psychotic    Assessment/Plan   Principal Problem:    Schizoaffective disorder, bipolar type (HCC)  Active Problems:    GERD (gastroesophageal reflux disease)    Medical clearance for psychiatric admission    Tobacco abuse    T wave inversion in EKG    Chronic idiopathic constipation    Vitamin B 12 deficiency    Vitamin D deficiency      Recommended Treatment:     Planned medication and treatment changes:    All current active medications have been  reviewed  Encourage group therapy, milieu therapy and occupational therapy  Behavioral Health checks every 7 minutes  Increase Clozaril to 125 mg at bedtime  Decrease loxapine 20 mg twice daily  Monitor weekly EKG, CBC, BNP, CR P, CK, troponin for Clozaril monitoring    Continue all the medications same    Current Facility-Administered Medications   Medication Dose Route Frequency Provider Last Rate    acetaminophen  650 mg Oral Q6H PRN Yasmin Harvey MD      acetaminophen  650 mg Oral Q4H PRN Yasmin Harvey MD      acetaminophen  975 mg Oral Q6H PRN Yasmin Harvey MD      aluminum-magnesium hydroxide-simethicone  30 mL Oral Q4H PRN Yasmin Harvey MD      haloperidol lactate  2.5 mg Intramuscular Q4H PRN Max 4/day Yasmin Harvey MD      And    LORazepam  1 mg Intramuscular Q4H PRN Max 4/day Yasmin Harvey MD      And    benztropine  0.5 mg Intramuscular Q4H PRN Max 4/day Yasmin Harvey MD      haloperidol lactate  5 mg Intramuscular Q4H PRN Max 4/day Yasmin Harvey MD      And    LORazepam  2 mg Intramuscular Q4H PRN Max 4/day Yasmin Harvey MD      And    benztropine  1 mg Intramuscular Q4H PRN Max 4/day Yasmin Harvey MD      benztropine  1 mg Oral Q4H PRN Max 6/day Yasmin Harvey MD      bisacodyl  10 mg Rectal Daily PRN Yasmin Harvey MD      cloZAPine  125 mg Oral HS HOLLI Anderson      hydrOXYzine HCL  50 mg Oral Q6H PRN Max 4/day Yasmin Harvey MD      Or    diphenhydrAMINE  50 mg Intramuscular Q6H PRN Yasmin Harvey MD      diphenhydrAMINE-zinc acetate   Topical BID PRN Anjel Sagastume MD      escitalopram  20 mg Oral Daily La Flores MD      haloperidol  1 mg Oral Q6H PRN Yasmin Harvey MD      haloperidol  2.5 mg Oral Q4H PRN Max 4/day Yasmin Harvey MD      haloperidol  5 mg Oral Q4H PRN Max 4/day Yasmin Harvey MD      hydrocortisone   Topical 4x Daily PRN Eileen Miya Kormandy, CRNP      hydrOXYzine HCL  100 mg Oral Q6H PRN Max 4/day Yasmin JACKMAN  MD Candice      Or    LORazepam  2 mg Intramuscular Q6H PRN Yasmin Harvey MD      hydrOXYzine HCL  25 mg Oral Q6H PRN Max 4/day Yasmin Harvey MD      loxapine  20 mg Oral BID HOLLI Anderson      melatonin  3 mg Oral HS Yasmin Harvey MD      mirtazapine  45 mg Oral HS La Flores MD      nicotine polacrilex  4 mg Oral Q2H PRN Yasmin Harvey MD      polyethylene glycol  17 g Oral Daily PRN Yasmin Harvey MD      polyethylene glycol  17 g Oral Daily HOLLI Monge      senna-docusate sodium  1 tablet Oral Daily PRN Yasmin Harvey MD      senna-docusate sodium  1 tablet Oral BID La Flores MD      traZODone  50 mg Oral HS PRN Yasmin Harvey MD      white petrolatum-mineral oil   Topical TID PRN Anjel Sagastume MD       Risks / Benefits of Treatment:    Risks, benefits, and possible side effects of medications explained to patient and patient verbalizes understanding and agreement for treatment.    Counseling / Coordination of Care:    Patient's progress discussed with staff in treatment team meeting.  Medications, treatment progress and treatment plan reviewed with patient.    HOLLI Anderson 01/24/24

## 2024-01-24 NOTE — PLAN OF CARE
Problem: PSYCHOSIS  Goal: Will report no hallucinations or delusions  Description: Interventions:  - Administer medication as  ordered  - Every waking shifts and PRN assess for the presence of hallucinations and or delusions  - Assist with reality testing to support increasing orientation  - Assess if patient's hallucinations or delusions are encouraging self-harm or harm to others and intervene as appropriate  Outcome: Progressing     Problem: ANXIETY  Goal: Will report anxiety at manageable levels  Description: INTERVENTIONS:  - Administer medication as ordered  - Teach and encourage coping skills  - Provide emotional support  - Assess patient/family for anxiety and ability to cope  1/24/2024 0134 by Mavis Covarrubias RN  Outcome: Progressing  1/24/2024 0134 by Mavis Covarrubias RN  Outcome: Progressing     Problem: Ineffective Coping  Goal: Participates in unit activities  Description: Interventions:  - Provide therapeutic environment   - Provide required programming   - Redirect inappropriate behaviors   1/24/2024 0134 by Mavis Covarrubias RN  Outcome: Progressing  1/24/2024 0134 by Mavis Covarrubias RN  Outcome: Progressing     Problem: Depression  Goal: Treatment Goal: Demonstrate behavioral control of depressive symptoms, verbalize feelings of improved mood/affect, and adopt new coping skills prior to discharge  1/24/2024 0134 by Mavis Covarrubias RN  Outcome: Progressing  1/24/2024 0134 by Mavis Covarrubias RN  Outcome: Progressing  Goal: Refrain from isolation  Description: Interventions:  - Develop a trusting relationship   - Encourage socialization   Outcome: Progressing  Goal: Refrain from self-neglect  Outcome: Progressing

## 2024-01-24 NOTE — NURSING NOTE
PT is calm and cooperative. Visible and social with peers and staff. Denies SI/HI/VH. PT endorses still having AH of voices, but  are manageable. Med and meal compliant. Denies any unmet needs at this time.

## 2024-01-24 NOTE — CASE MANAGEMENT
Writer discussed morning meeting that will be held with patient, his sister, and his  with nursing staff. Writer made nursing aware that patient has a meeting tomorrow at 8:15AM that is regarding eliminating barrier to obtaining insurance for treatment.

## 2024-01-25 PROBLEM — R21 RASH: Status: ACTIVE | Noted: 2024-01-25

## 2024-01-25 PROCEDURE — 99232 SBSQ HOSP IP/OBS MODERATE 35: CPT | Performed by: PSYCHIATRY & NEUROLOGY

## 2024-01-25 PROCEDURE — 99231 SBSQ HOSP IP/OBS SF/LOW 25: CPT | Performed by: NURSE PRACTITIONER

## 2024-01-25 RX ORDER — MINOCYCLINE HYDROCHLORIDE 100 MG/1
100 CAPSULE ORAL EVERY 12 HOURS SCHEDULED
Status: DISCONTINUED | OUTPATIENT
Start: 2024-01-25 | End: 2024-03-29 | Stop reason: HOSPADM

## 2024-01-25 RX ORDER — LOXAPINE SUCCINATE 10 MG/1
10 TABLET ORAL 2 TIMES DAILY
Status: DISCONTINUED | OUTPATIENT
Start: 2024-01-25 | End: 2024-01-29

## 2024-01-25 RX ADMIN — NICOTINE POLACRILEX 4 MG: 4 GUM, CHEWING ORAL at 15:47

## 2024-01-25 RX ADMIN — MELATONIN TAB 3 MG 3 MG: 3 TAB at 21:27

## 2024-01-25 RX ADMIN — NICOTINE POLACRILEX 4 MG: 4 GUM, CHEWING ORAL at 17:54

## 2024-01-25 RX ADMIN — SENNOSIDES AND DOCUSATE SODIUM 1 TABLET: 8.6; 5 TABLET ORAL at 17:40

## 2024-01-25 RX ADMIN — ESCITALOPRAM OXALATE 20 MG: 10 TABLET ORAL at 09:00

## 2024-01-25 RX ADMIN — NICOTINE POLACRILEX 4 MG: 4 GUM, CHEWING ORAL at 10:09

## 2024-01-25 RX ADMIN — LOXAPINE 10 MG: 10 CAPSULE ORAL at 21:27

## 2024-01-25 RX ADMIN — LOXAPINE 20 MG: 10 CAPSULE ORAL at 09:00

## 2024-01-25 RX ADMIN — SENNOSIDES AND DOCUSATE SODIUM 1 TABLET: 8.6; 5 TABLET ORAL at 09:00

## 2024-01-25 RX ADMIN — CLOZAPINE 150 MG: 100 TABLET ORAL at 21:27

## 2024-01-25 RX ADMIN — MINOCYCLINE HYDROCHLORIDE 100 MG: 100 CAPSULE ORAL at 21:28

## 2024-01-25 RX ADMIN — MIRTAZAPINE 45 MG: 30 TABLET, FILM COATED ORAL at 21:27

## 2024-01-25 RX ADMIN — MINOCYCLINE HYDROCHLORIDE 100 MG: 100 CAPSULE ORAL at 11:37

## 2024-01-25 RX ADMIN — POLYETHYLENE GLYCOL 3350 17 G: 17 POWDER, FOR SOLUTION ORAL at 09:00

## 2024-01-25 NOTE — PROGRESS NOTES
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)  First line treatment is minocycline 100 mg BID for 3 months  It can recur after treatment, therefore, patient is recommended to follow-up with dermatology  Please message derm on call at time of discharge to facilitate a follow-up appt     Acanthosis nigricans  Assessment & Plan  Suspecting as patient has dark, velvety patches in body folds on neck   No pain or itching   A1c 4.9 -> 6.2 with 30 lb weight gain in past 6 months   Change to carb controlled diet   Add Metformin 500 mg daily   Would benefit from weight loss and diabetes control             VTE Pharmacologic Prophylaxis:   Low Risk (Score 0-2) - Encourage Ambulation.    Mobility:      HLM Goal achieved. Continue to encourage appropriate mobility.    Patient Centered Rounds: I performed bedside rounds with nursing staff today.   Discussions with Specialists or Other Care Team Provider: nursing, psychaitry, dermatology     Education and Discussions with Family / Patient:  patient .     Total Time Spent on Date of Encounter in care of patient:  mins. This time was spent on one or more of the following: performing physical exam; counseling and coordination of care; obtaining or reviewing history; documenting in the medical record; reviewing/ordering tests, medications or procedures; communicating with other healthcare professionals and discussing with patient's family/caregivers.    Current Length of Stay: 72 day(s)  Current Patient Status: Inpatient Psych   Certification Statement: The patient will continue to require additional inpatient hospital stay due to IPBHU  Discharge Plan: [facility] is following  this patient on consult. They are medically stable for discharge when deemed appropriate by primary service.    Code Status: Level 1 - Full Code    Subjective:   Patient seen and examined at bedside. Discussed dermatology diagnosis and recommendations for Minocycline for 3 months with close outpatient follow-up. Patient amenable.      Objective:     Vitals:   Temp (24hrs), Av.2 °F (36.8 °C), Min:98.2 °F (36.8 °C), Max:98.2 °F (36.8 °C)    Temp:  [98.2 °F (36.8 °C)] 98.2 °F (36.8 °C)  HR:  [107-113] 107  Resp:  [16-18] 16  BP: (148-150)/(82-93) 148/82  SpO2:  [99 %-100 %] 100 %  Body mass index is 40.31 kg/m².     Input and Output Summary (last 24 hours):   No intake or output data in the 24 hours ending 24 1049    Physical Exam:   Physical Exam  Vitals and nursing note reviewed.   Constitutional:       General: He is not in acute distress.     Appearance: He is obese. He is not toxic-appearing or diaphoretic.   HENT:      Head: Normocephalic.      Mouth/Throat:      Mouth: Mucous membranes are moist.   Eyes:      Conjunctiva/sclera: Conjunctivae normal.   Cardiovascular:      Rate and Rhythm: Normal rate.   Pulmonary:      Effort: Pulmonary effort is normal.      Breath sounds: Normal breath sounds.   Abdominal:      General: Bowel sounds are normal.      Palpations: Abdomen is soft.   Musculoskeletal:         General: Normal range of motion.      Cervical back: Normal range of motion.      Right lower leg: No edema.      Left lower leg: No edema.   Skin:     General: Skin is warm and dry.      Capillary Refill: Capillary refill takes less than 2 seconds.      Comments: Please see pictures of rash to trunk in media    Neurological:      Mental Status: He is alert and oriented to person, place, and time. Mental status is at baseline.   Psychiatric:         Mood and Affect: Mood is depressed. Affect is flat.         Speech: Speech normal.         Behavior: Behavior is cooperative.          Additional Data:      Labs:  Results from last 7 days   Lab Units 01/23/24  0647   WBC Thousand/uL 9.13   HEMOGLOBIN g/dL 12.3   HEMATOCRIT % 41.1   PLATELETS Thousands/uL 284   NEUTROS PCT % 56   LYMPHS PCT % 30   MONOS PCT % 9   EOS PCT % 4                 Results from last 7 days   Lab Units 01/23/24  0647   HEMOGLOBIN A1C % 5.8*           Lines/Drains:  Invasive Devices       None                         Imaging: No pertinent imaging reviewed.    Recent Cultures (last 7 days):         Last 24 Hours Medication List:   Current Facility-Administered Medications   Medication Dose Route Frequency Provider Last Rate    acetaminophen  650 mg Oral Q6H PRN Yasmin Harvey MD      acetaminophen  650 mg Oral Q4H PRN Yasmin Harvey MD      acetaminophen  975 mg Oral Q6H PRN Yasmin Harvey MD      aluminum-magnesium hydroxide-simethicone  30 mL Oral Q4H PRN Yasmin Harvey MD      haloperidol lactate  2.5 mg Intramuscular Q4H PRN Max 4/day Yasmin Harvey MD      And    LORazepam  1 mg Intramuscular Q4H PRN Max 4/day Yasmin Harvey MD      And    benztropine  0.5 mg Intramuscular Q4H PRN Max 4/day Yasmin Harvey MD      haloperidol lactate  5 mg Intramuscular Q4H PRN Max 4/day Yasmin Harvey MD      And    LORazepam  2 mg Intramuscular Q4H PRN Max 4/day Yasmin Harvey MD      And    benztropine  1 mg Intramuscular Q4H PRN Max 4/day Yasmin Harvey MD      benztropine  1 mg Oral Q4H PRN Max 6/day Yasmin Harvey MD      bisacodyl  10 mg Rectal Daily PRN Yasmin Harvey MD      cloZAPine  150 mg Oral HS HOLLI Anderson      hydrOXYzine HCL  50 mg Oral Q6H PRN Max 4/day Yasmin Harvey MD      Or    diphenhydrAMINE  50 mg Intramuscular Q6H PRN Yasmin Harvey MD      diphenhydrAMINE-zinc acetate   Topical BID PRN Anjel Sagastume MD      escitalopram  20 mg Oral Daily La Flores MD      haloperidol  1 mg Oral Q6H PRN Yasmin Harvey MD      haloperidol  2.5 mg Oral Q4H PRN Max 4/day Yasmin Harvey,  MD      haloperidol  5 mg Oral Q4H PRN Max 4/day Yasmin Harvey MD      hydrocortisone   Topical 4x Daily PRN HOLLI Monge      hydrOXYzine HCL  100 mg Oral Q6H PRN Max 4/day Yasmin Harvey MD      Or    LORazepam  2 mg Intramuscular Q6H PRN Yasmin Harvey MD      hydrOXYzine HCL  25 mg Oral Q6H PRN Max 4/day Yasmin Harvey MD      loxapine  10 mg Oral BID HOLLI Anderson      melatonin  3 mg Oral HS Yasmin Harvey MD      [START ON 1/26/2024] metFORMIN  500 mg Oral Daily With Breakfast HOLLI Monge      minocycline  100 mg Oral Q12H KAYLIE HOLLI Monge      mirtazapine  45 mg Oral HS La Flores MD      nicotine polacrilex  4 mg Oral Q2H PRN Yasmin Harvey MD      polyethylene glycol  17 g Oral Daily PRN Yasmin Harvey MD      polyethylene glycol  17 g Oral Daily HOLLI Monge      senna-docusate sodium  1 tablet Oral Daily PRN Yasmin Harvey MD      senna-docusate sodium  1 tablet Oral BID La Flores MD      traZODone  50 mg Oral HS PRN Yasmin Harvey MD      white petrolatum-mineral oil   Topical TID PRN Anjel Sagastume MD          Today, Patient Was Seen By: HOLLI Monge    **Please Note: This note may have been constructed using a voice recognition system.**

## 2024-01-25 NOTE — QUICK NOTE
A1c 4.9 -> 6.2 with 30 lb weight gain in past 6 months   Change to carb controlled diet   Add Metformin 500 mg daily   Needs weight loss and diabetes control especially in the setting of acanthosis nigricans

## 2024-01-25 NOTE — PROGRESS NOTES
01/25/24 1055   Team Meeting   Meeting Type Daily Rounds   Team Members Present   Team Members Present Physician;Nurse;   Physician Team Member Mark   Nursing Team Member Giancarlo   Care Management Team Member Miguelito   Patient/Family Present   Patient Present No   Patient's Family Present No     Patient currently holds 201 status. Patient continues to report AH but denies all other symptoms.Patient is medication and meal compliant. Patient to continue on Clozaril, staff to monitor. Provider to continue tapering patient off Loxapine. Patient discharge date and time to be determined.

## 2024-01-25 NOTE — NURSING NOTE
"Pt is calm and cooperative. Visible in the milieu, social with staff and peers. Denies SI, HI, VH, and pain. Reports ongoing AH, pt stated \"They're the same and still not any better\". Compliant with HS medication and snack. Requested and given PRN trazodone 50mg for insomnia at 2143. Attended wrap up group. Denied unmet needs/ concerns before resting in bed.  "

## 2024-01-25 NOTE — NURSING NOTE
Pt is calm and cooperative upon approach. Visible eating breakfast, social with peers. Reports ongoing AH, denies all other symptoms. Compliant with medication. Denies unmet needs/ concerns at this time.

## 2024-01-25 NOTE — PROGRESS NOTES
"Progress Note - Behavioral Health     Alberto Berumen 28 y.o. male MRN: [id number]   Unit/Bed#: [facility] 383-02 Encounter: [id number]    Documentation, nursing notes, medication reconciliation, labs, and vitals reviewed. Patient was seen for continuing care and reviewed with treatment team. No acute events over the past 24 hours.  Per nursing report, calm and cooperative, more visible in the milieu and social with staff and peers.  Ongoing hallucinations.    Medication changes over the past 24 hours: Continue to titrate up on his Clozaril as tolerated.  He is started on metformin due to HbA1c increase since admission and weight gain.  He is started on minocycline for rash.  Both of these findings are managed by internal medicine. Continues to tolerate current medications with no adverse effects.     On evaluation today, he continues to endorse some improvement in depressive symptoms.  But states auditory hallucinations \"are the same, no better \".    No suicidal ideation, plan, or intent. Ongoing perceptual disturbances and does not exhibit any symptoms of aimee on evaluation.    Psychiatric ROS:  Behavior over the last 24 hours: limited improvement  Sleep: slept off and on  Appetite: increased  Medication side effects: No   ROS: reports rash and unchanged, all other systems are negative    Mental Status Evaluation:    Appearance:  age appropriate   Behavior:  cooperative   Speech:  slow   Mood:  depressed   Affect:  flat   Thought Process:  goal directed   Associations: concrete associations   Thought Content:  mild paranoia   Perceptual Disturbances: auditory hallucinations   Risk Potential: Suicidal ideation - None  Homicidal ideation - None  Potential for aggression - No   Sensorium:  oriented to person, place, and time/date   Memory:  recent and remote memory grossly intact   Consciousness:  alert and awake   Attention/Concentration: decreased concentration and decreased attention span   Insight:  limited   Judgment: " limited   Gait/Station: normal gait/station, normal balance   Motor Activity: no abnormal movements     Vital signs in last 24 hours:    Temp:  [98.2 °F (36.8 °C)] 98.2 °F (36.8 °C)  HR:  [107-113] 107  Resp:  [16-18] 16  BP: (148-150)/(82-93) 148/82    Laboratory results: I have personally reviewed all pertinent laboratory/tests results    Results from the past 24 hours: No results found for this or any previous visit (from the past 24 hour(s)).    Suicide/Homicide Risk Assessment:    Risk of Harm to Self:   Current Specific Risk Factors include: current depressive symptoms, presence of hallucinations  Protective Factors: no current suicidal ideation, ability to communicate with staff on the unit, able to contract for safety on the unit, taking medications as ordered on the unit  Based on today's assessment, Alberto presents the following risk of harm to self: minimal    Risk of Harm to Others:  Current Specific Risk Factors include: current psychotic symptoms  Protective Factors: no current homicidal ideation, able to communicate with staff on the unit, mood is stabilizing, psychotic symptoms are less prominent  Based on today's assessment, Alberto presents the following risk of harm to others: minimal    The following interventions are recommended: behavioral checks every 7 minutes, continued hospitalization on locked unit    Progress Toward Goals: limited improvement, still depressed, still psychotic    Assessment/Plan   Principal Problem:    Schizoaffective disorder, bipolar type (HCC)  Active Problems:    GERD (gastroesophageal reflux disease)    Medical clearance for psychiatric admission    Tobacco abuse    T wave inversion in EKG    Chronic idiopathic constipation    Vitamin B 12 deficiency    Vitamin D deficiency    Acanthosis nigricans      Recommended Treatment:     Planned medication and treatment changes:    All current active medications have been reviewed  Encourage group therapy, milieu therapy and  occupational therapy  Behavioral Health checks every 7 minutes  Increase Clozaril to 150 mg tonight and continue to titrate up as tolerated  Decrease loxapine to 10 mg twice daily and continue to taper off as tolerated  Monitor weekly CBC, BUN P, CK, CRP, troponin every Monday for Clozaril monitoring    Continue all other medications the same    Current Facility-Administered Medications   Medication Dose Route Frequency Provider Last Rate    acetaminophen  650 mg Oral Q6H PRN Yasmin Harvey MD      acetaminophen  650 mg Oral Q4H PRN Yasmin Harvey MD      acetaminophen  975 mg Oral Q6H PRN Yasmin Harvey MD      aluminum-magnesium hydroxide-simethicone  30 mL Oral Q4H PRN Yasmin Harvey MD      haloperidol lactate  2.5 mg Intramuscular Q4H PRN Max 4/day Yasmin Harvey MD      And    LORazepam  1 mg Intramuscular Q4H PRN Max 4/day Yasmin Harvey MD      And    benztropine  0.5 mg Intramuscular Q4H PRN Max 4/day Yasmin Harvey MD      haloperidol lactate  5 mg Intramuscular Q4H PRN Max 4/day Yasmin Harvey MD      And    LORazepam  2 mg Intramuscular Q4H PRN Max 4/day Yasmin Harvey MD      And    benztropine  1 mg Intramuscular Q4H PRN Max 4/day Yasmin Harvey MD      benztropine  1 mg Oral Q4H PRN Max 6/day Yasmin Harvey MD      bisacodyl  10 mg Rectal Daily PRN Yasmin Harvey MD      cloZAPine  150 mg Oral HS HOLLI Anderson      hydrOXYzine HCL  50 mg Oral Q6H PRN Max 4/day Yasmin Harvey MD      Or    diphenhydrAMINE  50 mg Intramuscular Q6H PRN Yasmin Harvey MD      diphenhydrAMINE-zinc acetate   Topical BID PRN Anjel Sagastume MD      escitalopram  20 mg Oral Daily La Flores MD      haloperidol  1 mg Oral Q6H PRN Yasmin Harvey MD      haloperidol  2.5 mg Oral Q4H PRN Max 4/day Yasmin Harvey MD      haloperidol  5 mg Oral Q4H PRN Max 4/day Yasmin Harvey MD      hydrocortisone   Topical 4x Daily PRN HOLLI Monge      hydrOXYzine HCL   100 mg Oral Q6H PRN Max 4/day Yasmin Harvey MD      Or    LORazepam  2 mg Intramuscular Q6H PRN Yasmin Harvey MD      hydrOXYzine HCL  25 mg Oral Q6H PRN Max 4/day Yasmin Harvey MD      loxapine  10 mg Oral BID HOLLI Anderson      melatonin  3 mg Oral HS Yasmin Harvey MD      [START ON 1/26/2024] metFORMIN  500 mg Oral Daily With Breakfast HOLLI Monge      mirtazapine  45 mg Oral HS La Flores MD      nicotine polacrilex  4 mg Oral Q2H PRN Yasmin Harvey MD      polyethylene glycol  17 g Oral Daily PRN Yasmin Harvey MD      polyethylene glycol  17 g Oral Daily HOLLI Monge      senna-docusate sodium  1 tablet Oral Daily PRN Yasmin Harvey MD      senna-docusate sodium  1 tablet Oral BID La Flores MD      traZODone  50 mg Oral HS PRN Yasmin Harvey MD      white petrolatum-mineral oil   Topical TID PRN Anjel Sagastume MD       Risks / Benefits of Treatment:    Risks, benefits, and possible side effects of medications explained to patient and patient verbalizes understanding and agreement for treatment.  The patient has a history of at least 3 antipsychotic medication trials and at this time requires treatment with 2 antipsychotic agents (Clozaril and Loxitane) due to failed multiple trials of monotherapy.    Counseling / Coordination of Care:    Patient's progress discussed with staff in treatment team meeting.  Medications, treatment progress and treatment plan reviewed with patient.    HOLLI Anderson 01/25/24

## 2024-01-26 PROCEDURE — 99232 SBSQ HOSP IP/OBS MODERATE 35: CPT | Performed by: PSYCHIATRY & NEUROLOGY

## 2024-01-26 RX ADMIN — LOXAPINE 10 MG: 10 CAPSULE ORAL at 09:25

## 2024-01-26 RX ADMIN — MELATONIN TAB 3 MG 3 MG: 3 TAB at 21:40

## 2024-01-26 RX ADMIN — ESCITALOPRAM OXALATE 20 MG: 10 TABLET ORAL at 09:25

## 2024-01-26 RX ADMIN — LOXAPINE 10 MG: 10 CAPSULE ORAL at 21:40

## 2024-01-26 RX ADMIN — MINOCYCLINE HYDROCHLORIDE 100 MG: 100 CAPSULE ORAL at 09:25

## 2024-01-26 RX ADMIN — NICOTINE POLACRILEX 4 MG: 4 GUM, CHEWING ORAL at 18:03

## 2024-01-26 RX ADMIN — SENNOSIDES AND DOCUSATE SODIUM 1 TABLET: 8.6; 5 TABLET ORAL at 18:04

## 2024-01-26 RX ADMIN — METFORMIN HYDROCHLORIDE 500 MG: 500 TABLET ORAL at 09:25

## 2024-01-26 RX ADMIN — SENNOSIDES AND DOCUSATE SODIUM 1 TABLET: 8.6; 5 TABLET ORAL at 09:25

## 2024-01-26 RX ADMIN — Medication 1 APPLICATION: at 09:40

## 2024-01-26 RX ADMIN — NICOTINE POLACRILEX 4 MG: 4 GUM, CHEWING ORAL at 16:03

## 2024-01-26 RX ADMIN — NICOTINE POLACRILEX 4 MG: 4 GUM, CHEWING ORAL at 09:26

## 2024-01-26 RX ADMIN — CLOZAPINE 150 MG: 100 TABLET ORAL at 21:40

## 2024-01-26 RX ADMIN — MINOCYCLINE HYDROCHLORIDE 100 MG: 100 CAPSULE ORAL at 21:40

## 2024-01-26 RX ADMIN — MIRTAZAPINE 45 MG: 30 TABLET, FILM COATED ORAL at 21:40

## 2024-01-26 RX ADMIN — POLYETHYLENE GLYCOL 3350 17 G: 17 POWDER, FOR SOLUTION ORAL at 09:25

## 2024-01-26 NOTE — PROGRESS NOTES
01/26/24 1510   Team Meeting   Meeting Type Daily Rounds   Team Members Present   Team Members Present Physician;Nurse;   Physician Team Member Mark   Nursing Team Member Drew   Care Management Team Member Miguelito   Patient/Family Present   Patient Present No   Patient's Family Present No     Patient currently holds 201 status. Patient continues to report AH. Patient is medication and meal compliant. Patient to continue on Clozaril , staff to monitor. Writer currently attempting to refer patient to [facility] to assist with MA. Patient discharge date and time to be determined.

## 2024-01-26 NOTE — TREATMENT PLAN
TREATMENT PLAN REVIEW - Behavioral Health Alberto Ata 28 y.o. 1995 male MRN: [id number]    [facility] 3P BEHAVIORAL HLTH Room / Bed: [facility] 383/[facility] 383-02 Encounter: [id number]        Admit Date/Time:  11/14/2023 10:44 PM    Treatment Team:   MD Екатерина Fontaine MD Hailey Farrell, RN  DO Eileen Hayes CRNP Cheryl J Urban-McCabe, RN Loree Smith Pope, TANK Light, MIGUEL Armas    Diagnosis: Principal Problem:    Schizoaffective disorder, bipolar type (HCC)  Active Problems:    GERD (gastroesophageal reflux disease)    Medical clearance for psychiatric admission    Tobacco abuse    T wave inversion in EKG    Chronic idiopathic constipation    Vitamin B 12 deficiency    Vitamin D deficiency    Acanthosis nigricans    Rash      Patient Strengths/Assets: negotiates basic needs, patient is on a voluntary commitment, stable housing      Patient Barriers/Limitations: chronic mental illness, difficulty adapting, poor past treatment response    Short Term Goals: decrease in depressive symptoms, decrease in psychotic symptoms, tolerate medications    Long Term Goals: resolution of psychotic symptoms, improved insight    Progress Towards Goals: continue psychiatric medications as prescribed    Recommended Treatment: medication management, patient medication education, group therapy, milieu therapy, continued Behavioral Health psychiatric evaluation/assessment process     Treatment Frequency: daily medication monitoring, group and milieu therapy daily, monitoring through interdisciplinary rounds, monitoring through weekly patient care conferences    Expected Discharge Date: 30 days - 2/25/2024    Discharge Plan: referral for outpatient medication management with a psychiatrist, referral for outpatient psychotherapy, return to previous living  arrangement    Treatment Plan Created/Updated By: La Flores MD

## 2024-01-26 NOTE — NURSING NOTE
Patient was assessed in room during medication administration. Alberto denies AVH/SI/HI. He is cooperative, and medication compliant. He is in behavioral control, social and visible. He denies having further needs.

## 2024-01-26 NOTE — NURSING NOTE
Patient is visible on unit sitting in dayroom or walking the hallway, pleasant and social with peers and staff. Patient is calm, cooperative, and compliant with medications. Patient denies SI/HI. Patient continues to endorse auditory hallucinations and denies visual hallucinations. Patient denies depression and anxiety. No complaints or unmet needs identified at this time. Q 7 minute safety checks maintained.

## 2024-01-26 NOTE — PROGRESS NOTES
Progress Note - Behavioral Health     Alberto Berumen 28 y.o. male MRN: [id number]   Unit/Bed#: [facility] 383-02 Encounter: [id number]    Documentation, nursing notes, medication reconciliation, labs, and vitals reviewed. Patient was seen for continuing care and reviewed with treatment team. No acute events over the past 24 hours.  Per nursing report, at times isolative to his room, at times more social and in the milieu.  Attends some groups.  Continues to report auditory hallucinations unchanged.    Medication changes over the past 24 hours: Clozaril was increased last night to 150 mg at at bedtime.  We continue to monitor for any increase of heart rate. Continues to tolerate current medications with no adverse effects.     On evaluation today, he is seen in his room today resting in bed.  Expressing frustration due to ongoing hallucinations.  Over the past 2 weeks have noted some improvement in depressive symptoms.  But reports ongoing auditory hallucinations of voices threatening him and his family and telling him he will go to hell.    No suicidal ideation, plan, or intent. Denies perceptual disturbances and does not exhibit any symptoms of aimee on evaluation.    Psychiatric ROS:  Behavior over the last 24 hours: unchanged  Sleep: slept off and on  Appetite: fair  Medication side effects: No   ROS: no complaints, all other systems are negative    Mental Status Evaluation:    Appearance:  age appropriate   Behavior:  cooperative   Speech:  normal rate, normal volume   Mood:  depressed   Affect:  constricted   Thought Process:  decreased rate of thoughts   Associations: concrete associations   Thought Content:  mild paranoia   Perceptual Disturbances: Auditory hallucinations are unchanged   Risk Potential: Suicidal ideation - None  Homicidal ideation - None  Potential for aggression - No   Sensorium:  oriented to person, place, and time/date   Memory:  recent and remote memory grossly intact   Consciousness:  alert  and awake   Attention/Concentration: attention span and concentration appear shorter than expected for age   Insight:  limited   Judgment: limited   Gait/Station: normal gait/station, normal balance   Motor Activity: no abnormal movements     Vital signs in last 24 hours:    Temp:  [97.3 °F (36.3 °C)-97.7 °F (36.5 °C)] 97.3 °F (36.3 °C)  HR:  [111-119] 111  Resp:  [18] 18  BP: (128-132)/(73-84) 132/84    Laboratory results: I have personally reviewed all pertinent laboratory/tests results    Results from the past 24 hours: No results found for this or any previous visit (from the past 24 hour(s)).    Suicide/Homicide Risk Assessment:    Risk of Harm to Self:   Current Specific Risk Factors include: current depressive symptoms, current psychotic symptoms  Protective Factors: no current suicidal ideation, ability to communicate with staff on the unit, able to contract for safety on the unit, taking medications as ordered on the unit  Based on today's assessment, Alberto presents the following risk of harm to self: minimal    Risk of Harm to Others:  Current Specific Risk Factors include: current psychotic symptoms  Protective Factors: no current homicidal ideation, able to communicate with staff on the unit, impulse control is improving, mood is stabilizing, psychotic symptoms are less prominent  Based on today's assessment, Alberto presents the following risk of harm to others: minimal    The following interventions are recommended: behavioral checks every 7 minutes, continued hospitalization on locked unit    Progress Toward Goals: no significant improvement, still depressed, still psychotic    Assessment/Plan   Principal Problem:    Schizoaffective disorder, bipolar type ([facility])  Active Problems:    GERD (gastroesophageal reflux disease)    Medical clearance for psychiatric admission    Tobacco abuse    T wave inversion in EKG    Chronic idiopathic constipation    Vitamin B 12 deficiency    Vitamin D deficiency     Acanthosis nigricans    Rash      Recommended Treatment:     Planned medication and treatment changes:    All current active medications have been reviewed  Encourage group therapy, milieu therapy and occupational therapy  Behavioral Health checks every 7 minutes    Continue loxapine 10 mg twice daily  Continue Clozaril 150 mg at bedtime  With plan to continue to cross  titrate slowly from Loxitane to Clozaril as tolerated and when heart rate decreases  Monitor EKG, CBC, BNP, CK, CRP, troponin weekly for Clozaril monitoring    Continue all other medications:    Current Facility-Administered Medications   Medication Dose Route Frequency Provider Last Rate    acetaminophen  650 mg Oral Q6H PRN Yasmin Harvey MD      acetaminophen  650 mg Oral Q4H PRN Yasmin Harvey MD      acetaminophen  975 mg Oral Q6H PRN Yasmin Harvey MD      aluminum-magnesium hydroxide-simethicone  30 mL Oral Q4H PRN Yasmin Harvey MD      haloperidol lactate  2.5 mg Intramuscular Q4H PRN Max 4/day Yasmin Harvey MD      And    LORazepam  1 mg Intramuscular Q4H PRN Max 4/day Yasmin Harvey MD      And    benztropine  0.5 mg Intramuscular Q4H PRN Max 4/day Yasmin Harvey MD      haloperidol lactate  5 mg Intramuscular Q4H PRN Max 4/day Yasmin Harvey MD      And    LORazepam  2 mg Intramuscular Q4H PRN Max 4/day Yasmin Harvey MD      And    benztropine  1 mg Intramuscular Q4H PRN Max 4/day Yasmin Harvey MD      benztropine  1 mg Oral Q4H PRN Max 6/day Yasmin Harvey MD      bisacodyl  10 mg Rectal Daily PRN Yasmin Harvey MD      cloZAPine  150 mg Oral HS HOLLI Anderson      hydrOXYzine HCL  50 mg Oral Q6H PRN Max 4/day Yasmin Harvey MD      Or    diphenhydrAMINE  50 mg Intramuscular Q6H PRN Yasmin Harvey MD      diphenhydrAMINE-zinc acetate   Topical BID PRN Anjel Sagastume MD      escitalopram  20 mg Oral Daily La Flores MD      haloperidol  1 mg Oral Q6H PRN Yasmin Harvey MD       haloperidol  2.5 mg Oral Q4H PRN Max 4/day Yasmin Harvey MD      haloperidol  5 mg Oral Q4H PRN Max 4/day Yasmin Harvey MD      hydrocortisone   Topical 4x Daily PRN HOLLI Monge      hydrOXYzine HCL  100 mg Oral Q6H PRN Max 4/day Yasmin Harvey MD      Or    LORazepam  2 mg Intramuscular Q6H PRN Yasmin Harvey MD      hydrOXYzine HCL  25 mg Oral Q6H PRN Max 4/day Yasmin Harvey MD      loxapine  10 mg Oral BID HOLLI Anderson      melatonin  3 mg Oral HS Yasmin Harvey MD      metFORMIN  500 mg Oral Daily With Breakfast HOLLI Monge      minocycline  100 mg Oral Q12H KAYLIE HLOLI Monge      mirtazapine  45 mg Oral HS La Flores MD      nicotine polacrilex  4 mg Oral Q2H PRN Yasmin Harvey MD      polyethylene glycol  17 g Oral Daily PRN Yasmin Harvey MD      polyethylene glycol  17 g Oral Daily HOLLI Monge      senna-docusate sodium  1 tablet Oral Daily PRN Yasmin Harvey MD      senna-docusate sodium  1 tablet Oral BID La Flores MD      traZODone  50 mg Oral HS PRN Yasmin Harvey MD      white petrolatum-mineral oil   Topical TID PRN Anjel Sagastume MD       Risks / Benefits of Treatment:    Risks, benefits, and possible side effects of medications explained to patient and patient verbalizes understanding and agreement for treatment.  The patient has a history of at least 3 antipsychotic medication trials and at this time requires treatment with 2 antipsychotic agents (Clozaril and Loxitane) due to failed multiple trials of monotherapy.    Counseling / Coordination of Care:    Patient's progress discussed with staff in treatment team meeting.  Medications, treatment progress and treatment plan reviewed with patient.    HOLLI Anderson 01/26/24

## 2024-01-27 PROCEDURE — 99232 SBSQ HOSP IP/OBS MODERATE 35: CPT | Performed by: STUDENT IN AN ORGANIZED HEALTH CARE EDUCATION/TRAINING PROGRAM

## 2024-01-27 RX ADMIN — METFORMIN HYDROCHLORIDE 500 MG: 500 TABLET ORAL at 09:13

## 2024-01-27 RX ADMIN — SENNOSIDES AND DOCUSATE SODIUM 1 TABLET: 8.6; 5 TABLET ORAL at 17:14

## 2024-01-27 RX ADMIN — NICOTINE POLACRILEX 4 MG: 4 GUM, CHEWING ORAL at 12:44

## 2024-01-27 RX ADMIN — MINOCYCLINE HYDROCHLORIDE 100 MG: 100 CAPSULE ORAL at 09:18

## 2024-01-27 RX ADMIN — NICOTINE POLACRILEX 4 MG: 4 GUM, CHEWING ORAL at 16:02

## 2024-01-27 RX ADMIN — POLYETHYLENE GLYCOL 3350 17 G: 17 POWDER, FOR SOLUTION ORAL at 09:13

## 2024-01-27 RX ADMIN — MELATONIN TAB 3 MG 3 MG: 3 TAB at 21:03

## 2024-01-27 RX ADMIN — MIRTAZAPINE 45 MG: 30 TABLET, FILM COATED ORAL at 21:03

## 2024-01-27 RX ADMIN — NICOTINE POLACRILEX 4 MG: 4 GUM, CHEWING ORAL at 18:07

## 2024-01-27 RX ADMIN — MINOCYCLINE HYDROCHLORIDE 100 MG: 100 CAPSULE ORAL at 21:07

## 2024-01-27 RX ADMIN — NICOTINE POLACRILEX 4 MG: 4 GUM, CHEWING ORAL at 09:13

## 2024-01-27 RX ADMIN — SENNOSIDES AND DOCUSATE SODIUM 1 TABLET: 8.6; 5 TABLET ORAL at 09:13

## 2024-01-27 RX ADMIN — LOXAPINE 10 MG: 10 CAPSULE ORAL at 21:03

## 2024-01-27 RX ADMIN — Medication: at 09:13

## 2024-01-27 RX ADMIN — LOXAPINE 10 MG: 10 CAPSULE ORAL at 09:13

## 2024-01-27 RX ADMIN — CLOZAPINE 150 MG: 100 TABLET ORAL at 21:03

## 2024-01-27 RX ADMIN — ESCITALOPRAM OXALATE 20 MG: 10 TABLET ORAL at 09:13

## 2024-01-27 NOTE — NURSING NOTE
Patient calm and cooperative, medication compliant.     Patient continues to endorse AH and they remain unchanged.    Patient denies any other psychiatric symptoms at this time, denies any unmet needs.

## 2024-01-27 NOTE — NURSING NOTE
Patient was assessed in room in bed while doing medication administration. Patient denies AVH/SI/HI. He reports he is feeling fine. Patient denies further needs at this time, patient is in behavioral control.

## 2024-01-27 NOTE — PROGRESS NOTES
"Progress Note - Behavioral Health     Alberto Berumen 28 y.o. male MRN: [id number]   Unit/Bed#: -02 Encounter: [id number]    Behavior over the last 24 hours: unchanged.     Alberto today was isolated to his room in the morning, in the afternoon and was seen making a phone call.  Per staff, he continues with auditory hallucinations that his family is going to die.  Patient today corroborates the same, he states he is having auditory hallucinations that say they will kill him and kill his family.  I discussed with patient that I will hold on increasing his Clozaril because his heart rate has been over 100, patient states that is \"understandable\".  He discussed otherwise he feels his depressed mood has improved since being on the unit, he discussed his mood was \"10 out of 10 depression\" on admission and now is about 6 or 7 out of 10, with 10 being the worst depressed mood.  He denied suicidal thoughts, he denied homicidal thoughts.  He denied any fears or intentions to act on what the voices are saying.  He denied visual hallucinations.    Sleep: slept off and on  Appetite: normal  Medication side effects: No   ROS: all other systems are negative    Mental Status Evaluation:    Appearance:  age appropriate   Behavior:  cooperative   Speech:  normal volume, decreased speech productivity   Mood:  depressed   Affect:  flat   Thought Process:  goal directed   Associations: concrete associations   Thought Content:  no overt delusions, patient previously noted to have paranoia   Perceptual Disturbances: Auditory hallucinations as above, no visual hallucinations   Risk Potential: Suicidal ideation - None  Homicidal ideation - None  Potential for aggression - No   Sensorium:  oriented to person, place, and time/date   Memory:  recent and remote memory grossly intact   Consciousness:  alert and awake   Attention/Concentration: attention span and concentration are age appropriate   Insight:  limited   Judgment: limited "   Gait/Station: normal gait/station   Motor Activity: no abnormal movements     Vital signs in last 24 hours:    Temp:  [97.3 °F (36.3 °C)-97.8 °F (36.6 °C)] 97.3 °F (36.3 °C)  HR:  [111] 111  Resp:  [16-18] 16  BP: (123-152)/(66-99) 123/66    Laboratory results: I have personally reviewed all pertinent laboratory/tests results    Labs in last 72 hours: No new labs    Progress Toward Goals: Remains unchanged, patient continues with hallucinations    Assessment/Plan   Principal Problem:    Schizoaffective disorder, bipolar type ([facility])  Active Problems:    GERD (gastroesophageal reflux disease)    Medical clearance for psychiatric admission    Tobacco abuse    T wave inversion in EKG    Chronic idiopathic constipation    Vitamin B 12 deficiency    Vitamin D deficiency    Acanthosis nigricans    Rash      Recommended Treatment:     Planned medication and treatment changes:    All current active medications have been reviewed  Encourage group therapy, milieu therapy and occupational therapy    Continue all current medications at current doses, as below:  - Continue Clozapine 150 mg at nighttime  - Continue Lexapro 20 mg once daily  - Continue loxapine 10 mg twice a day  - Continue melatonin 3 mg at nighttime  - Continue mirtazapine 45 mg at nighttime    Current Facility-Administered Medications   Medication Dose Route Frequency Provider Last Rate    acetaminophen  650 mg Oral Q6H PRN Yasmin Harvey MD      acetaminophen  650 mg Oral Q4H PRN Yasmin Harvey MD      acetaminophen  975 mg Oral Q6H PRN Yasmin Harvey MD      aluminum-magnesium hydroxide-simethicone  30 mL Oral Q4H PRN Yasmin Harvey MD      haloperidol lactate  2.5 mg Intramuscular Q4H PRN Max 4/day Yasmin Harvey MD      And    LORazepam  1 mg Intramuscular Q4H PRN Max 4/day Yasmin Harvey MD      And    benztropine  0.5 mg Intramuscular Q4H PRN Max 4/day Yasmin Harvey MD      haloperidol lactate  5 mg Intramuscular Q4H PRN Max 4/day  Yasmin Harvey MD      And    LORazepam  2 mg Intramuscular Q4H PRN Max 4/day Yasmin Harvey MD      And    benztropine  1 mg Intramuscular Q4H PRN Max 4/day Yasmin Harvey MD      benztropine  1 mg Oral Q4H PRN Max 6/day Yasmin Harvey MD      bisacodyl  10 mg Rectal Daily PRN Yasmin Harvey MD      cloZAPine  150 mg Oral HS HOLLI Anderson      hydrOXYzine HCL  50 mg Oral Q6H PRN Max 4/day Yasmin Harvey MD      Or    diphenhydrAMINE  50 mg Intramuscular Q6H PRN Yasmin Harvey MD      diphenhydrAMINE-zinc acetate   Topical BID PRN Anjel Sagastume MD      escitalopram  20 mg Oral Daily La Flores MD      haloperidol  1 mg Oral Q6H PRN Yasmin Harvey MD      haloperidol  2.5 mg Oral Q4H PRN Max 4/day Yasmin Harvey MD      haloperidol  5 mg Oral Q4H PRN Max 4/day Yasmin Harvey MD      hydrocortisone   Topical 4x Daily PRN HOLLI Monge      hydrOXYzine HCL  100 mg Oral Q6H PRN Max 4/day Yasmin Harvey MD      Or    LORazepam  2 mg Intramuscular Q6H PRN Yasmin Harvey MD      hydrOXYzine HCL  25 mg Oral Q6H PRN Max 4/day Yasmin Harvey MD      loxapine  10 mg Oral BID HOLLI Anderson      melatonin  3 mg Oral HS Yasmin Harvey MD      metFORMIN  500 mg Oral Daily With Breakfast HOLLI Monge      minocycline  100 mg Oral Q12H KAYLIE HOLLI Monge      mirtazapine  45 mg Oral HS La Flores MD      nicotine polacrilex  4 mg Oral Q2H PRN Yasmin Harvey MD      polyethylene glycol  17 g Oral Daily PRN Yasmin Harvey MD      polyethylene glycol  17 g Oral Daily HOLLI Monge      senna-docusate sodium  1 tablet Oral Daily PRN Yasmin Havrey MD      senna-docusate sodium  1 tablet Oral BID La Flores MD      traZODone  50 mg Oral HS PRN Yasmin Harvey MD      white petrolatum-mineral oil   Topical TID PRN Anjel Sagastume MD       Risks / Benefits of Treatment:    Risks, benefits, and  possible side effects of medications explained to patient and patient verbalizes understanding and agreement for treatment.    Counseling / Coordination of Care:    Medications, treatment progress and treatment plan reviewed with patient.    Damari Larose MD 01/27/24

## 2024-01-28 PROCEDURE — 99232 SBSQ HOSP IP/OBS MODERATE 35: CPT | Performed by: STUDENT IN AN ORGANIZED HEALTH CARE EDUCATION/TRAINING PROGRAM

## 2024-01-28 RX ORDER — ATROPINE SULFATE 10 MG/ML
1 SOLUTION/ DROPS OPHTHALMIC 2 TIMES DAILY
Status: DISCONTINUED | OUTPATIENT
Start: 2024-01-28 | End: 2024-02-07

## 2024-01-28 RX ADMIN — ATROPINE SULFATE 1 DROP: 10 SOLUTION/ DROPS OPHTHALMIC at 14:30

## 2024-01-28 RX ADMIN — LOXAPINE 10 MG: 10 CAPSULE ORAL at 09:44

## 2024-01-28 RX ADMIN — METFORMIN HYDROCHLORIDE 500 MG: 500 TABLET ORAL at 08:30

## 2024-01-28 RX ADMIN — MELATONIN TAB 3 MG 3 MG: 3 TAB at 21:52

## 2024-01-28 RX ADMIN — LOXAPINE 10 MG: 10 CAPSULE ORAL at 21:52

## 2024-01-28 RX ADMIN — CLOZAPINE 150 MG: 100 TABLET ORAL at 21:51

## 2024-01-28 RX ADMIN — MIRTAZAPINE 45 MG: 30 TABLET, FILM COATED ORAL at 21:52

## 2024-01-28 RX ADMIN — NICOTINE POLACRILEX 4 MG: 4 GUM, CHEWING ORAL at 09:45

## 2024-01-28 RX ADMIN — MINOCYCLINE HYDROCHLORIDE 100 MG: 100 CAPSULE ORAL at 21:54

## 2024-01-28 RX ADMIN — POLYETHYLENE GLYCOL 3350 17 G: 17 POWDER, FOR SOLUTION ORAL at 09:44

## 2024-01-28 RX ADMIN — NICOTINE POLACRILEX 4 MG: 4 GUM, CHEWING ORAL at 17:30

## 2024-01-28 RX ADMIN — SENNOSIDES AND DOCUSATE SODIUM 1 TABLET: 8.6; 5 TABLET ORAL at 09:44

## 2024-01-28 RX ADMIN — Medication: at 09:45

## 2024-01-28 RX ADMIN — SENNOSIDES AND DOCUSATE SODIUM 1 TABLET: 8.6; 5 TABLET ORAL at 17:29

## 2024-01-28 RX ADMIN — NICOTINE POLACRILEX 4 MG: 4 GUM, CHEWING ORAL at 14:27

## 2024-01-28 RX ADMIN — MINOCYCLINE HYDROCHLORIDE 100 MG: 100 CAPSULE ORAL at 09:45

## 2024-01-28 RX ADMIN — ESCITALOPRAM OXALATE 20 MG: 10 TABLET ORAL at 09:44

## 2024-01-28 NOTE — NURSING NOTE
Patient was assessed in room during medication administration, patient reports no AVH/SI/HI. Patient has had no behavioral issues to note. Patient has been med compliant. Patient states they have no further needs at this time. Patient reportedly has been having sialorrhea at nighttime per night shift. Psychiatry Provider Dr. Larose, contacted.

## 2024-01-28 NOTE — NURSING NOTE
"Patient was in their room most of the evening, though came out for unit snack time. Reports feeling depressed and auditory hallucinations that \"I'm going to hell and they are going to kill me and my family\". Denies SI, HI, of anxiety. Medication compliant and patient encouraged to come to staff if medications to not help with their hallucinations.    Able to make needs known throughout the evening and denies any unmet needs or concerns at this time.   "

## 2024-01-28 NOTE — PROGRESS NOTES
"Progress Note - Behavioral Health     Alberto Berumen 28 y.o. male MRN: [id number]   Unit/Bed#: [facility] 383-02 Encounter: [id number]    Behavior over the last 24 hours: unchanged.     Alberto was seen around the milieu, he appears interactive with peers.  On a interview today, patient continues to report continued auditory hallucinations of voices stating they will kill his family and the patient.  He states he sometimes has difficulty sleeping because of this.  He also reported today Ciella Picacho, he states overnight his shirt was wet from drooling overnight.  Patient has also reported drooling during the day.  Have started atropine drops 1 drop sublingual twice a day for now.  Patient was agreeable to this.  He otherwise denies suicidal ideations, homicidal ideations, he denies visual hallucinations.    Note HR yesterday down to 88 however today up again to 102, will hold on Clozaril increase.    Sleep:  Fair  Appetite: normal  Medication side effects: No   ROS: all other systems are negative    Mental Status Evaluation:    Appearance:  age appropriate   Behavior:  cooperative   Speech:  normal rate, decreased speech productivity   Mood:  \"Okay\"   Affect:  blunted   Thought Process:  goal directed   Associations: concrete associations   Thought Content:  no overt delusions   Perceptual Disturbances: no visual hallucinations, auditory hallucinations   Risk Potential: Suicidal ideation - None  Homicidal ideation - None  Potential for aggression - No   Sensorium:  oriented to person, place, and time/date   Memory:  recent and remote memory grossly intact   Consciousness:  alert and awake   Attention/Concentration: attention span and concentration are age appropriate   Insight:  limited   Judgment: limited   Gait/Station: normal gait/station   Motor Activity: no abnormal movements     Vital signs in last 24 hours:    Temp:  [97.1 °F (36.2 °C)-97.6 °F (36.4 °C)] 97.6 °F (36.4 °C)  HR:  [] 102  Resp:  [16-18] 18  BP: " Patient Education        Esguince de tobillo: Instrucciones de cuidado - [ Ankle Sprain: Care Instructions ]  Instrucciones de cuidado    Un esguince de tobillo puede ocurrir cuando se tuerce el tobillo. Los ligamentos que soportan el tobillo pueden estirarse y desgarrarse. Con frecuencia el tobillo se hincha y duele. Los esguinces (torceduras) de tobillo podrían tardar de varias semanas a varios meses en sanar. Por lo general, cuanto más dolor e hinchazón tiene, más grave es el esguince de tobillo y [place] tiempo tardará en sanar. Con un buen tratamiento en el hogar, puede sanar con [place] y recuperar la fuerza en el tobillo. Es muy importante darle tiempo al tobillo para que sane por completo para evitar que se vuelva a lastimar con facilidad. La atención de seguimiento es mars parte clave de rai tratamiento y seguridad. Asegúrese de hacer y acudir a todas las citas, y llame a rai médico si está teniendo problemas. También es mars buena idea saber los resultados de anahi exámenes y mantener mars lista de los medicamentos que blanquita. ¿Cómo puede cuidarse en el [facility]? · Eleve el pie sobre almohadas tanto aspen pueda harshil los siguientes 3 días. Trate de mantener el tobillo por encima del nivel del corazón. [place] ayudará a reducir la hinchazón. · [address] rai médico sobre el uso de mars tablilla ([place]) o mars venda elástica. Envolver el tobillo podría ayudarle a reducir o prevenir la hinchazón. · Es posible que rai médico le dé mars tablilla, un soporte ortopédico, mars tablilla de aire u otra forma de soporte para tobillo para protegerlo hasta que haya sanado. Úselo aspen se lo indicaron mientras rai tobillo esté en recuperación. No se lo quite a menos que rai médico lo autorice. [place] vez que rai tobillo haya sanado, pregúntele a rai médico si debe usar un soporte ortopédico cuando dangelo ejercicio. · [place] hielo o mars compresa fría en el tobillo lesionado harshil 10 a 20 minutos cada vez.  Trate de (141)/(78-82) 141/82    Laboratory results: I have personally reviewed all pertinent laboratory/tests results    Labs in last 72 hours: No new labs    Progress Toward Goals: Remains unchanged, patient continues with hallucinations    Assessment/Plan   Principal Problem:    Schizoaffective disorder, bipolar type ([facility])  Active Problems:    GERD (gastroesophageal reflux disease)    Medical clearance for psychiatric admission    Tobacco abuse    T wave inversion in EKG    Chronic idiopathic constipation    Vitamin B 12 deficiency    Vitamin D deficiency    Acanthosis nigricans    Rash      Recommended Treatment:     Planned medication and treatment changes:    All current active medications have been reviewed  Encourage group therapy, milieu therapy and occupational therapy  Continue all current medications at current doses, as below:  - Continue Clozapine 150 mg at nighttime  - Continue Lexapro 20 mg once daily  - Continue loxapine 10 mg twice a day  - Continue melatonin 3 mg at nighttime  - Continue mirtazapine 45 mg at nighttime    Current Facility-Administered Medications   Medication Dose Route Frequency Provider Last Rate    acetaminophen  650 mg Oral Q6H PRN Yasmin Harvey MD      acetaminophen  650 mg Oral Q4H PRN Yasmin Harvey MD      acetaminophen  975 mg Oral Q6H PRN Yasmin Harvey MD      aluminum-magnesium hydroxide-simethicone  30 mL Oral Q4H PRN Yasmin Harvey MD      atropine  1 drop Sublingual BID Damari Larose MD      haloperidol lactate  2.5 mg Intramuscular Q4H PRN Max 4/day Yasmin Harvey MD      And    LORazepam  1 mg Intramuscular Q4H PRN Max 4/day Yasmin Harvey MD      And    benztropine  0.5 mg Intramuscular Q4H PRN Max 4/day Yasmin Harvey MD      haloperidol lactate  5 mg Intramuscular Q4H PRN Max 4/day Yasmin Harvey MD      And    LORazepam  2 mg Intramuscular Q4H PRN Max 4/day Yasmin Harvey MD      And    benztropine  1 mg Intramuscular Q4H PRN Max  hacerlo cada 1 a 2 horas harshil los siguientes 3 días (cuando esté despierto) o hasta que la hinchazón baje. Póngase un paño rocha entre el hielo y la piel. · Es posible que deba usar muletas hasta que pueda caminar sin dolor. Si [place] muletas, trate de poner algo de peso sobre el tobillo lesionado siempre que esto no le cause dolor. [place] ayuda al tobillo a sanar. · [employer] analgésicos (medicamentos para el dolor) exactamente según las indicaciones. ? Si el médico le recetó analgésicos, tómelos según las indicaciones. ? Si no está tomando un analgésico recetado, pregúntele a rai médico si puede niall rebeca de [employer]. · Si le indicaron ejercicios con el tobillo para hacer en el hogar, hágalos exactamente según las instrucciones. [place] puede favorecer la curación y ayudar a prevenir la debilidad duradera. ¿Cuándo debe pedir ayuda? Llame a rai médico ahora mismo o busque atención médica inmediata si:    · El dolor está empeorando.     · La hinchazón está empeorando.     · Siente que la tablilla está demasiado apretada o no puede aflojarla.    Preste especial atención a los cambios en rai brenda y asegúrese de comunicarse con rai médico si:    · No está mejorando después de 1 semana. ¿Dónde puede encontrar más información en inglés? [place] a http://kia.info/. Cherelle Vasquez H260 en la búsqueda para aprender más acerca de \"Esguince de tobillo: Instrucciones de cuidado - [ Ankle Sprain: Care Instructions ]. \"  Revisado: 20 septiembre, 2018  Versión del contenido: 11.9  © 4340-8866 Healthwise, Incorporated. Las instrucciones de cuidado fueron adaptadas bajo licencia por [employer] (which disclaims liability or warranty for this information). Si usted tiene [place] afección médica o sobre estas instrucciones, siempre pregunte a rai profesional de brenda. Healthwise, Incorporated niega toda garantía o responsabilidad por rai uso de esta información.                 We hope that 4/day Yasmin Harvey MD      benztropine  1 mg Oral Q4H PRN Max 6/day Yasmin Harvey MD      bisacodyl  10 mg Rectal Daily PRN Yasmin Harvey MD      cloZAPine  150 mg Oral HS Prisca Villafuerte, HOLLI      hydrOXYzine HCL  50 mg Oral Q6H PRN Max 4/day Yasmin Harvey MD      Or    diphenhydrAMINE  50 mg Intramuscular Q6H PRN Yasmin Harvey MD      diphenhydrAMINE-zinc acetate   Topical BID PRN Anjel Sagastume MD      escitalopram  20 mg Oral Daily La Flores MD      haloperidol  1 mg Oral Q6H PRN Yasmin Harvey MD      haloperidol  2.5 mg Oral Q4H PRN Max 4/day Yasmin Harvey MD      haloperidol  5 mg Oral Q4H PRN Max 4/day Yasmin Harvey MD      hydrocortisone   Topical 4x Daily PRN HOLLI Monge      hydrOXYzine HCL  100 mg Oral Q6H PRN Max 4/day Yasmin Harvey MD      Or    LORazepam  2 mg Intramuscular Q6H PRN Yasmin Harvey MD      hydrOXYzine HCL  25 mg Oral Q6H PRN Max 4/day Yasmin Harvey MD      loxapine  10 mg Oral BID Prisca Villafuerte, HOLLI      melatonin  3 mg Oral HS Yasmin Harvey MD      metFORMIN  500 mg Oral Daily With Breakfast HOLLI Monge      minocycline  100 mg Oral Q12H [facility] HOLLI Monge      mirtazapine  45 mg Oral HS La Flores MD      nicotine polacrilex  4 mg Oral Q2H PRN Yasmin Harvey MD      polyethylene glycol  17 g Oral Daily PRN Yasmin Harvey MD      polyethylene glycol  17 g Oral Daily HOLLI Monge      senna-docusate sodium  1 tablet Oral Daily PRN Yasmin Harvey MD      senna-docusate sodium  1 tablet Oral BID La lFores MD      traZODone  50 mg Oral HS PRN Yasmin Harvey MD      white petrolatum-mineral oil   Topical TID PRN Anjel Sagastume MD       Risks / Benefits of Treatment:    Risks, benefits, and possible side effects of medications explained to patient and patient verbalizes understanding and agreement for treatment.    Counseling / Coordination of  we have addressed all of your medical concerns. The examination and treatment you received in the Emergency Department were for an emergent problem and were not intended as complete care. It is important that you follow up with your healthcare provider(s) for ongoing care. If your symptoms worsen or do not improve as expected, and you are unable to reach your usual health care provider(s), you should return to the Emergency Department. Today's healthcare is undergoing tremendous change, and patient satisfaction surveys are one of the many tools to assess the quality of medical care. You may receive a survey from the [employer] organization regarding your experience in the Emergency Department. I hope that your experience has been completely positive, particularly the medical care that I provided. As such, please participate in the survey; anything less than excellent does not meet my expectations or intentions. [facility] and [facility] participate in nationally recognized quality of care measures. If your blood pressure is greater than 120/80, as reported below, we urge that you seek medical care to address the potential of high blood pressure, commonly known as hypertension. Hypertension can be hereditary or can be caused by certain medical conditions, pain, stress, or \"white coat syndrome. \"       Please make an appointment with your health care provider(s) for follow up of your Emergency Department visit. VITALS:   Patient Vitals for the past 8 hrs:   Temp Pulse Resp BP SpO2   03/24/19 1527 99.2 °F (37.3 °C) 94 16 141/87 97 %          Thank you for allowing us to provide you with medical care today. We realize that you have many choices for your emergency care needs. Please choose us in the future for any continued health care needs. Georgette Bonilla, [address] [place] [address] [address]   Office: [phone]            No results found for this or any previous visit (from the past 24 hour(s)). Xr Ankle Rt Min 3 V    Result Date: 3/24/2019  EXAM: XR ANKLE RT MIN 3 V INDICATION: injury. COMPARISON: None. FINDINGS: Three views of the right ankle demonstrate no fracture or disruption of the ankle mortise. There is no other acute osseous or articular abnormality. There is generalized soft tissue swelling about the ankle. IMPRESSION: Soft tissue swelling. No fracture. Care:    Medications, treatment progress and treatment plan reviewed with patient.    Damari Larose MD 01/28/24

## 2024-01-29 LAB
ATRIAL RATE: 103 BPM
ATRIAL RATE: 106 BPM
BASOPHILS # BLD AUTO: 0.06 THOUSANDS/ÂΜL (ref 0–0.1)
BASOPHILS NFR BLD AUTO: 1 % (ref 0–1)
BNP SERPL-MCNC: 6 PG/ML (ref 0–100)
CARDIAC TROPONIN I PNL SERPL HS: 2 NG/L (ref 8–18)
CK SERPL-CCNC: 295 U/L (ref 39–308)
CRP SERPL QL: 11.8 MG/L
EOSINOPHIL # BLD AUTO: 0.3 THOUSAND/ÂΜL (ref 0–0.61)
EOSINOPHIL NFR BLD AUTO: 4 % (ref 0–6)
ERYTHROCYTE [DISTWIDTH] IN BLOOD BY AUTOMATED COUNT: 13.4 % (ref 11.6–15.1)
HCT VFR BLD AUTO: 42 % (ref 36.5–49.3)
HGB BLD-MCNC: 12.3 G/DL (ref 12–17)
IMM GRANULOCYTES # BLD AUTO: 0.03 THOUSAND/UL (ref 0–0.2)
IMM GRANULOCYTES NFR BLD AUTO: 0 % (ref 0–2)
LYMPHOCYTES # BLD AUTO: 2.89 THOUSANDS/ÂΜL (ref 0.6–4.47)
LYMPHOCYTES NFR BLD AUTO: 34 % (ref 14–44)
MCH RBC QN AUTO: 23.7 PG (ref 26.8–34.3)
MCHC RBC AUTO-ENTMCNC: 29.3 G/DL (ref 31.4–37.4)
MCV RBC AUTO: 81 FL (ref 82–98)
MONOCYTES # BLD AUTO: 0.89 THOUSAND/ÂΜL (ref 0.17–1.22)
MONOCYTES NFR BLD AUTO: 10 % (ref 4–12)
NEUTROPHILS # BLD AUTO: 4.42 THOUSANDS/ÂΜL (ref 1.85–7.62)
NEUTS SEG NFR BLD AUTO: 51 % (ref 43–75)
NRBC BLD AUTO-RTO: 0 /100 WBCS
P AXIS: 38 DEGREES
P AXIS: 42 DEGREES
PLATELET # BLD AUTO: 290 THOUSANDS/UL (ref 149–390)
PMV BLD AUTO: 10.1 FL (ref 8.9–12.7)
PR INTERVAL: 138 MS
PR INTERVAL: 142 MS
QRS AXIS: 53 DEGREES
QRS AXIS: 54 DEGREES
QRSD INTERVAL: 86 MS
QRSD INTERVAL: 88 MS
QT INTERVAL: 334 MS
QT INTERVAL: 338 MS
QTC INTERVAL: 442 MS
QTC INTERVAL: 443 MS
RBC # BLD AUTO: 5.18 MILLION/UL (ref 3.88–5.62)
T WAVE AXIS: -14 DEGREES
T WAVE AXIS: -6 DEGREES
VENTRICULAR RATE: 103 BPM
VENTRICULAR RATE: 106 BPM
WBC # BLD AUTO: 8.59 THOUSAND/UL (ref 4.31–10.16)

## 2024-01-29 PROCEDURE — 85025 COMPLETE CBC W/AUTO DIFF WBC: CPT | Performed by: NURSE PRACTITIONER

## 2024-01-29 PROCEDURE — 83880 ASSAY OF NATRIURETIC PEPTIDE: CPT | Performed by: NURSE PRACTITIONER

## 2024-01-29 PROCEDURE — 99232 SBSQ HOSP IP/OBS MODERATE 35: CPT | Performed by: PSYCHIATRY & NEUROLOGY

## 2024-01-29 PROCEDURE — 84484 ASSAY OF TROPONIN QUANT: CPT | Performed by: NURSE PRACTITIONER

## 2024-01-29 PROCEDURE — 93010 ELECTROCARDIOGRAM REPORT: CPT | Performed by: INTERNAL MEDICINE

## 2024-01-29 PROCEDURE — 93005 ELECTROCARDIOGRAM TRACING: CPT

## 2024-01-29 PROCEDURE — 82550 ASSAY OF CK (CPK): CPT | Performed by: NURSE PRACTITIONER

## 2024-01-29 PROCEDURE — 86140 C-REACTIVE PROTEIN: CPT | Performed by: NURSE PRACTITIONER

## 2024-01-29 RX ADMIN — MINOCYCLINE HYDROCHLORIDE 100 MG: 100 CAPSULE ORAL at 21:48

## 2024-01-29 RX ADMIN — POLYETHYLENE GLYCOL 3350 17 G: 17 POWDER, FOR SOLUTION ORAL at 08:07

## 2024-01-29 RX ADMIN — LOXAPINE 10 MG: 10 CAPSULE ORAL at 08:06

## 2024-01-29 RX ADMIN — NICOTINE POLACRILEX 4 MG: 4 GUM, CHEWING ORAL at 17:42

## 2024-01-29 RX ADMIN — MIRTAZAPINE 45 MG: 30 TABLET, FILM COATED ORAL at 21:47

## 2024-01-29 RX ADMIN — METFORMIN HYDROCHLORIDE 500 MG: 500 TABLET ORAL at 08:06

## 2024-01-29 RX ADMIN — ESCITALOPRAM OXALATE 20 MG: 10 TABLET ORAL at 08:06

## 2024-01-29 RX ADMIN — NICOTINE POLACRILEX 4 MG: 4 GUM, CHEWING ORAL at 15:42

## 2024-01-29 RX ADMIN — MINOCYCLINE HYDROCHLORIDE 100 MG: 100 CAPSULE ORAL at 08:06

## 2024-01-29 RX ADMIN — SENNOSIDES AND DOCUSATE SODIUM 1 TABLET: 8.6; 5 TABLET ORAL at 08:06

## 2024-01-29 RX ADMIN — NICOTINE POLACRILEX 4 MG: 4 GUM, CHEWING ORAL at 08:23

## 2024-01-29 RX ADMIN — NICOTINE POLACRILEX 4 MG: 4 GUM, CHEWING ORAL at 13:21

## 2024-01-29 RX ADMIN — MELATONIN TAB 3 MG 3 MG: 3 TAB at 21:48

## 2024-01-29 RX ADMIN — SENNOSIDES AND DOCUSATE SODIUM 1 TABLET: 8.6; 5 TABLET ORAL at 17:21

## 2024-01-29 RX ADMIN — CLOZAPINE 175 MG: 100 TABLET ORAL at 21:47

## 2024-01-29 NOTE — CASE MANAGEMENT
Writer called patients sister Ginger who serves as his rep payee and requested she call and request for an update on [place], as an appeal was file with the county last week with NIKI Muñoz and patient.

## 2024-01-29 NOTE — PROGRESS NOTES
Progress Note - Behavioral Health     Alberto Berumen 28 y.o. male MRN: [id number]   Unit/Bed#: [facility] 383-02 Encounter: [id number]    Documentation, nursing notes, medication reconciliation, labs, and vitals reviewed. Patient was seen for continuing care and reviewed with treatment team. No acute events over the past 24 hours.  Per nursing report, continues to report auditory hallucinations.  Continues to isolate to self and room.  Calm and cooperative and pleasant.    No medication changes over the past 24 hours. Continues to tolerate current medications with no adverse effects.     On evaluation today, is again seen in his room and resting in bed.  At intervals will be more active in the therapeutic milieu and notes does at times feel improvement in his depressive symptoms.  However, continues to report no improvement in auditory hallucinations.  Voices threatening to harm him and his family were telling him he will go to hell.  No suicidal ideation, plan, or intent.  Ongoing perceptual disturbances and does not exhibit any symptoms of aimee on evaluation.    Psychiatric ROS:  Behavior over the last 24 hours: minimal improvement  Sleep: hypersomnia  Appetite: increased, weight gain, he is making efforts to watch what he eats and walk more  Medication side effects: No   ROS: no complaints, all other systems are negative    Mental Status Evaluation:    Appearance:  marginal hygiene   Behavior:  cooperative, calm   Speech:  slow   Mood:  depressed   Affect:  flat   Thought Process:  goal directed   Associations: concrete associations   Thought Content:  no overt delusions   Perceptual Disturbances: auditory hallucinations of voices threatening to kill him or his family   Risk Potential: Suicidal ideation - None  Homicidal ideation - None  Potential for aggression - No   Sensorium:  oriented to person, place, and time/date   Memory:  recent and remote memory grossly intact   Consciousness:  alert and awake    Attention/Concentration: decreased concentration and decreased attention span   Insight:  limited   Judgment: limited   Gait/Station: normal gait/station, normal balance   Motor Activity: no abnormal movements     Vital signs in last 24 hours:    Temp:  [97.6 °F (36.4 °C)-97.8 °F (36.6 °C)] 97.8 °F (36.6 °C)  HR:  [] 98  Resp:  [18] 18  BP: (136-139)/(87-94) 136/94    Laboratory results: I have personally reviewed all pertinent laboratory/tests results    Results from the past 24 hours:   Recent Results (from the past 24 hour(s))   CBC and differential    Collection Time: 01/29/24  6:13 AM   Result Value Ref Range    WBC 8.59 4.31 - 10.16 Thousand/uL    RBC 5.18 3.88 - 5.62 Million/uL    Hemoglobin 12.3 12.0 - 17.0 g/dL    Hematocrit 42.0 36.5 - 49.3 %    MCV 81 (L) 82 - 98 fL    MCH 23.7 (L) 26.8 - 34.3 pg    MCHC 29.3 (L) 31.4 - 37.4 g/dL    RDW 13.4 11.6 - 15.1 %    MPV 10.1 8.9 - 12.7 fL    Platelets 290 149 - 390 Thousands/uL    nRBC 0 /100 WBCs    Neutrophils Relative 51 43 - 75 %    Immat GRANS % 0 0 - 2 %    Lymphocytes Relative 34 14 - 44 %    Monocytes Relative 10 4 - 12 %    Eosinophils Relative 4 0 - 6 %    Basophils Relative 1 0 - 1 %    Neutrophils Absolute 4.42 1.85 - 7.62 Thousands/µL    Immature Grans Absolute 0.03 0.00 - 0.20 Thousand/uL    Lymphocytes Absolute 2.89 0.60 - 4.47 Thousands/µL    Monocytes Absolute 0.89 0.17 - 1.22 Thousand/µL    Eosinophils Absolute 0.30 0.00 - 0.61 Thousand/µL    Basophils Absolute 0.06 0.00 - 0.10 Thousands/µL   CK    Collection Time: 01/29/24  6:13 AM   Result Value Ref Range    Total  39 - 308 U/L   C-reactive protein    Collection Time: 01/29/24  6:13 AM   Result Value Ref Range    CRP 11.8 (H) <3.0 mg/L   B-Type Natriuretic Peptide(BNP)    Collection Time: 01/29/24  6:13 AM   Result Value Ref Range    BNP 6 0 - 100 pg/mL   High Sensitivity Troponin I Random    Collection Time: 01/29/24  6:13 AM   Result Value Ref Range    HS TnI random 2 (L) 8 -  18 ng/L       Suicide/Homicide Risk Assessment:    Risk of Harm to Self:   Current Specific Risk Factors include: mental illness diagnosis, current depressive symptoms  Protective Factors: no current suicidal ideation, ability to communicate with staff on the unit, able to contract for safety on the unit, taking medications as ordered on the unit  Based on today's assessment, Alberto presents the following risk of harm to self: minimal    Risk of Harm to Others:  Current Specific Risk Factors include: current psychotic symptoms  Protective Factors: no current homicidal ideation, able to communicate with staff on the unit, impulse control is improving, psychotic symptoms are less prominent, compliant with medications on the unit as ordered  Based on today's assessment, Alberto presents the following risk of harm to others: minimal    The following interventions are recommended: behavioral checks every 7 minutes, continued hospitalization on locked unit    Progress Toward Goals: progressing    Assessment/Plan   Principal Problem:    Schizoaffective disorder, bipolar type (HCC)  Active Problems:    GERD (gastroesophageal reflux disease)    Medical clearance for psychiatric admission    Tobacco abuse    T wave inversion in EKG    Chronic idiopathic constipation    Vitamin B 12 deficiency    Vitamin D deficiency    Acanthosis nigricans    Rash      Recommended Treatment:     Planned medication and treatment changes:    All current active medications have been reviewed  Encourage group therapy, milieu therapy and occupational therapy  Behavioral Health checks every 7 minutes  Increase Clozaril to 175 mg at bedtime tonight  Decrease Loxitane, to receive Loxitane 10 mg daily for today, then discontinue  Monitor weekly EKG, CBC, CK, CRP, BNP, troponin every Monday for Clozaril monitoring    Continue all other medications the same    Current Facility-Administered Medications   Medication Dose Route Frequency Provider Last  Rate    acetaminophen  650 mg Oral Q6H PRN Yasmin Harvey MD      acetaminophen  650 mg Oral Q4H PRN Yasmin Harvey MD      acetaminophen  975 mg Oral Q6H PRN Yasmin Harvey MD      aluminum-magnesium hydroxide-simethicone  30 mL Oral Q4H PRN Yasmin Harvey MD      atropine  1 drop Sublingual BID Damari Larose MD      haloperidol lactate  2.5 mg Intramuscular Q4H PRN Max 4/day Yasmin Harvey MD      And    LORazepam  1 mg Intramuscular Q4H PRN Max 4/day Yasmin Harvey MD      And    benztropine  0.5 mg Intramuscular Q4H PRN Max 4/day Yasmin Harvey MD      haloperidol lactate  5 mg Intramuscular Q4H PRN Max 4/day Yasmin Harvey MD      And    LORazepam  2 mg Intramuscular Q4H PRN Max 4/day Yasmin Harvey MD      And    benztropine  1 mg Intramuscular Q4H PRN Max 4/day Yasmin Harvey MD      benztropine  1 mg Oral Q4H PRN Max 6/day Yasmin Harvey MD      bisacodyl  10 mg Rectal Daily PRN Yasmin Harvey MD      cloZAPine  175 mg Oral HS HOLLI Anderson      hydrOXYzine HCL  50 mg Oral Q6H PRN Max 4/day Yasmin Harvey MD      Or    diphenhydrAMINE  50 mg Intramuscular Q6H PRN Yasmin Harvey MD      diphenhydrAMINE-zinc acetate   Topical BID PRN Anjel Sagastume MD      escitalopram  20 mg Oral Daily La Flores MD      haloperidol  1 mg Oral Q6H PRN Yasmin Harvey MD      haloperidol  2.5 mg Oral Q4H PRN Max 4/day Yasmin Harvey MD      haloperidol  5 mg Oral Q4H PRN Max 4/day Yasmin Harvey MD      hydrocortisone   Topical 4x Daily PRN HOLLI Monge      hydrOXYzine HCL  100 mg Oral Q6H PRN Max 4/day Yasmin Harvey MD      Or    LORazepam  2 mg Intramuscular Q6H PRN Yasmin Harvey MD      hydrOXYzine HCL  25 mg Oral Q6H PRN Max 4/day Yasmin Harvey MD      melatonin  3 mg Oral HS Yasmin Harvey MD      metFORMIN  500 mg Oral Daily With Breakfast HOLLI Monge      minocycline  100 mg Oral Q12H [facility]  HOLLI Jensen      mirtazapine  45 mg Oral HS La Flores MD      nicotine polacrilex  4 mg Oral Q2H PRN Yasmin Harvey MD      polyethylene glycol  17 g Oral Daily PRN Yasmin Harvey MD      polyethylene glycol  17 g Oral Daily Eileen Holliday HOLLI Jensen      senna-docusate sodium  1 tablet Oral Daily PRN Yasmin Harvey MD      senna-docusate sodium  1 tablet Oral BID La Flores MD      traZODone  50 mg Oral HS PRN Yasmin Harvey MD      white petrolatum-mineral oil   Topical TID PRN Anjel Sagastume MD       Risks / Benefits of Treatment:    Risks, benefits, and possible side effects of medications explained to patient and patient verbalizes understanding and agreement for treatment.    Counseling / Coordination of Care:    Patient's progress discussed with staff in treatment team meeting.  Medications, treatment progress and treatment plan reviewed with patient.    HOLLI Anderson 01/29/24

## 2024-01-29 NOTE — NURSING NOTE
Patient calm and cooperative, medication compliant.     Patient endorses AH, however states they are the same and is otherwise doing well. Patient refused night time atropine drops, stating they made his mouth too dry.    Patient denies psychiatric symptoms at this time, denies any unmet needs.

## 2024-01-29 NOTE — PROGRESS NOTES
01/29/24 1608   Team Meeting   Meeting Type Daily Rounds   Team Members Present   Team Members Present Physician;Nurse;   Physician Team Member Mark   Nursing Team Member Tavon   Care Management Team Member Miguelito   Patient/Family Present   Patient Present No   Patient's Family Present No     Patient currently holds 201 status. Patient continuing to report AH but denies all other symptoms. Patient is medication and meal compliant. Patient to continue on all current scheduled medications, staff continue to monitor. Patient discharge date and time to be determined.

## 2024-01-29 NOTE — NURSING NOTE
Pt denies SI, HI, and VH. Pt continues to report AH. Pt is isolative to self and room. Pt has no complaints or concerns. Pt is calm, cooperative and pleasant. Medication and meal complaint.

## 2024-01-30 PROCEDURE — 99232 SBSQ HOSP IP/OBS MODERATE 35: CPT | Performed by: PSYCHIATRY & NEUROLOGY

## 2024-01-30 RX ORDER — CLOZAPINE 200 MG/1
200 TABLET ORAL
Status: DISCONTINUED | OUTPATIENT
Start: 2024-01-30 | End: 2024-01-31

## 2024-01-30 RX ADMIN — POLYETHYLENE GLYCOL 3350 17 G: 17 POWDER, FOR SOLUTION ORAL at 08:22

## 2024-01-30 RX ADMIN — SENNOSIDES AND DOCUSATE SODIUM 1 TABLET: 8.6; 5 TABLET ORAL at 17:57

## 2024-01-30 RX ADMIN — NICOTINE POLACRILEX 4 MG: 4 GUM, CHEWING ORAL at 13:13

## 2024-01-30 RX ADMIN — CLOZAPINE 200 MG: 200 TABLET ORAL at 21:15

## 2024-01-30 RX ADMIN — MELATONIN TAB 3 MG 3 MG: 3 TAB at 21:15

## 2024-01-30 RX ADMIN — METFORMIN HYDROCHLORIDE 500 MG: 500 TABLET ORAL at 08:21

## 2024-01-30 RX ADMIN — MIRTAZAPINE 45 MG: 30 TABLET, FILM COATED ORAL at 21:15

## 2024-01-30 RX ADMIN — MINOCYCLINE HYDROCHLORIDE 100 MG: 100 CAPSULE ORAL at 08:21

## 2024-01-30 RX ADMIN — NICOTINE POLACRILEX 4 MG: 4 GUM, CHEWING ORAL at 08:21

## 2024-01-30 RX ADMIN — MINOCYCLINE HYDROCHLORIDE 100 MG: 100 CAPSULE ORAL at 21:15

## 2024-01-30 RX ADMIN — NICOTINE POLACRILEX 4 MG: 4 GUM, CHEWING ORAL at 17:57

## 2024-01-30 RX ADMIN — SENNOSIDES AND DOCUSATE SODIUM 1 TABLET: 8.6; 5 TABLET ORAL at 08:21

## 2024-01-30 RX ADMIN — ESCITALOPRAM OXALATE 20 MG: 10 TABLET ORAL at 08:21

## 2024-01-30 NOTE — NURSING NOTE
Pt denies SI, HI, and VH. Pt still reporting ongoing AH. Pt is isolative to self and room with brief intervals in the milieu for meals and needs. Pt has no complaints or concerns. Pt is calm and cooperative. Medication and meal complaint.

## 2024-01-30 NOTE — NURSING NOTE
"Pt visible, walking unit, calm, social. Pt is constricted in affect, appropriate during conversation. Pt endorses \"the same\" negative AH, states \"it's the same as always.\" Pt appears frustrated by this. Pt denies SI/HI/VH and is medication adherent.   "

## 2024-01-30 NOTE — PROGRESS NOTES
01/30/24 1534   Team Meeting   Meeting Type Daily Rounds   Team Members Present   Team Members Present Physician;Nurse;   Physician Team Member Mark   Nursing Team Member Giancarlo   Care Management Team Member Miguelito   Patient/Family Present   Patient Present No   Patient's Family Present No     Patient currently holds 201 status. Patient continues to report AH but denies all other symptoms. Patient is compliant with medications and meals. Provider to increase dose of Clozaril, staff to monitor. Patient discharge date and time to be determined.

## 2024-01-30 NOTE — CASE MANAGEMENT
Writer obtained phone call from sister Ginger. [place] explained she was told the [facility] received his application. [place] disclosed she got the case workers name that is working on patients case and will coordinate with her as needed. [place] requested writer obtain reward letter from patient to assist [facility] with required documentation. [place] asked for updates on patients treatment. Writer disclosed his current scheduled medications. [place] then requested writer request patient to call her, writer stated she would.

## 2024-01-30 NOTE — PROGRESS NOTES
"Progress Note - Behavioral Health     Alberto Berumen 28 y.o. male MRN: [id number]   Unit/Bed#: [facility] 383-02 Encounter: [id number]    Documentation, nursing notes, medication reconciliation, labs, and vitals reviewed. Patient was seen for continuing care and reviewed with treatment team. No acute events over the past 24 hours.  Per nursing report, he continues out of his room more, still complains of auditory hallucinations.    Medication changes over the past 24 hours. Continues to tolerate current medications with no adverse effects. On evaluation today, he is again seen in his room.  He reports he continues to see gradual improvement in depressive symptoms.  But this week notes he is feeling less preoccupied, \"less in my head\".. But still no improvement in hallucinations.  No suicidal ideation, plan, or intent. Ongoing perceptual disturbances and does not exhibit any symptoms of aimee on evaluation.    Psychiatric ROS:  Behavior over the last 24 hours: minimal improvement  Sleep: hypersomnia  Appetite: fair  Medication side effects: No   ROS: no complaints, all other systems are negative    Mental Status Evaluation:    Appearance:  marginal hygiene   Behavior:  cooperative   Speech:  scant   Mood:  dysphoric   Affect:  constricted   Thought Process:  decreased rate of thoughts   Associations: concrete associations   Thought Content:  some paranoia   Perceptual Disturbances: auditory hallucinations of voices threatening him and his family   Risk Potential: Suicidal ideation - None  Homicidal ideation - None  Potential for aggression - No   Sensorium:  oriented to person, place, and time/date   Memory:  recent and remote memory grossly intact   Consciousness:  alert and awake   Attention/Concentration: decreased concentration and decreased attention span   Insight:  limited   Judgment: limited   Gait/Station: normal gait/station, normal balance   Motor Activity: no abnormal movements     Vital signs in last 24 " hours:    Temp:  [97.5 °F (36.4 °C)-97.6 °F (36.4 °C)] 97.5 °F (36.4 °C)  HR:  [] 102  Resp:  [16-18] 18  BP: (135-136)/(77-99) 136/99    Laboratory results: I have personally reviewed all pertinent laboratory/tests results    Results from the past 24 hours:   Recent Results (from the past 24 hour(s))   ECG 12 lead    Collection Time: 01/29/24  2:11 PM   Result Value Ref Range    Ventricular Rate 103 BPM    Atrial Rate 103 BPM    NV Interval 138 ms    QRSD Interval 86 ms    QT Interval 338 ms    QTC Interval 442 ms    P Axis 38 degrees    QRS Axis 53 degrees    T Wave Axis -6 degrees   ECG 12 lead    Collection Time: 01/29/24  2:11 PM   Result Value Ref Range    Ventricular Rate 106 BPM    Atrial Rate 106 BPM    NV Interval 142 ms    QRSD Interval 88 ms    QT Interval 334 ms    QTC Interval 443 ms    P [place] 42 degrees    QRS Axis 54 degrees    T Wave Axis -14 degrees       Suicide/Homicide Risk Assessment:    Risk of Harm to Self:   Current Specific Risk Factors include: current depressive symptoms  Protective Factors: no current suicidal ideation  Based on today's assessment, Alberto presents the following risk of harm to self: low    Risk of Harm to Others:  Current Specific Risk Factors include: diagnosis of mood disorder  Protective Factors: no current homicidal ideation, able to communicate with staff on the unit, improved impulse control, impulse control is improving  Based on today's assessment, Alberto presents the following risk of harm to others: minimal    The following interventions are recommended: behavioral checks every 7 minutes, continued hospitalization on locked unit    Progress Toward Goals: progressing    Assessment/Plan   Principal Problem:    Schizoaffective disorder, bipolar type (HCC)  Active Problems:    GERD (gastroesophageal reflux disease)    Medical clearance for psychiatric admission    Tobacco abuse    T wave inversion in EKG    Chronic idiopathic constipation    Vitamin B 12  deficiency    Vitamin D deficiency    Acanthosis nigricans    Rash      Recommended Treatment:     Planned medication and treatment changes:    All current active medications have been reviewed  Encourage group therapy, milieu therapy and occupational therapy  Behavioral Health checks every 7 minutes  Increase Clozaril 200 mg HS    Weekly monitoring for Clozaril EKG, CBC, CK, troponin, CPR every Monday     Continue all the meds    Current Facility-Administered Medications   Medication Dose Route Frequency Provider Last Rate    acetaminophen  650 mg Oral Q6H PRN Yasmin Harvey MD      acetaminophen  650 mg Oral Q4H PRN Yasmin Harvey MD      acetaminophen  975 mg Oral Q6H PRN Yasmin Harvey MD      aluminum-magnesium hydroxide-simethicone  30 mL Oral Q4H PRN Yasmin Harvey MD      atropine  1 drop Sublingual BID Damari Larose MD      haloperidol lactate  2.5 mg Intramuscular Q4H PRN Max 4/day Yasmin Harvey MD      And    LORazepam  1 mg Intramuscular Q4H PRN Max 4/day Yasmin Harvey MD      And    benztropine  0.5 mg Intramuscular Q4H PRN Max 4/day Yasmin Harvey MD      haloperidol lactate  5 mg Intramuscular Q4H PRN Max 4/day Yasmin Harvey MD      And    LORazepam  2 mg Intramuscular Q4H PRN Max 4/day Yasmin Harvey MD      And    benztropine  1 mg Intramuscular Q4H PRN Max 4/day Yasmin Harvey MD      benztropine  1 mg Oral Q4H PRN Max 6/day Yasmin Harvey MD      bisacodyl  10 mg Rectal Daily PRN Yasmin Harvey MD      cloZAPine  200 mg Oral HS HOLLI Anderson      hydrOXYzine HCL  50 mg Oral Q6H PRN Max 4/day Yasmin Harvey MD      Or    diphenhydrAMINE  50 mg Intramuscular Q6H PRN Yasmin Harvey MD      diphenhydrAMINE-zinc acetate   Topical BID PRN Anjel Sagastume MD      escitalopram  20 mg Oral Daily La Flores MD      haloperidol  1 mg Oral Q6H PRN Yasmin Harvey MD      haloperidol  2.5 mg Oral Q4H PRN Max 4/day Yasmin Harvey MD       haloperidol  5 mg Oral Q4H PRN Max 4/day Yasmin Harvey MD      hydrocortisone   Topical 4x Daily PRN HOLLI Monge      hydrOXYzine HCL  100 mg Oral Q6H PRN Max 4/day Yasmin Harvey MD      Or    LORazepam  2 mg Intramuscular Q6H PRN Yasmin Harvey MD      hydrOXYzine HCL  25 mg Oral Q6H PRN Max 4/day Yasmin Harvey MD      melatonin  3 mg Oral HS Yasmin Harvey MD      metFORMIN  500 mg Oral Daily With Breakfast HOLLI Monge      minocycline  100 mg Oral Q12H KAYLIE HOLLI Monge      mirtazapine  45 mg Oral HS La Flores MD      nicotine polacrilex  4 mg Oral Q2H PRN Yasmin Harvey MD      polyethylene glycol  17 g Oral Daily PRN Yasmin Harvey MD      polyethylene glycol  17 g Oral Daily HOLLI Monge      senna-docusate sodium  1 tablet Oral Daily PRN Yasmin Harvey MD      senna-docusate sodium  1 tablet Oral BID La Flores MD      traZODone  50 mg Oral HS PRN Yasmin Harvey MD      white petrolatum-mineral oil   Topical TID PRN Anjel Sagastume MD       Risks / Benefits of Treatment:    Risks, benefits, and possible side effects of medications explained to patient and patient verbalizes understanding and agreement for treatment.    Counseling / Coordination of Care:    Patient's progress discussed with staff in treatment team meeting.  Medications, treatment progress and treatment plan reviewed with patient.    HOLLI Anderson 01/30/24

## 2024-01-31 PROCEDURE — 99232 SBSQ HOSP IP/OBS MODERATE 35: CPT | Performed by: PSYCHIATRY & NEUROLOGY

## 2024-01-31 RX ADMIN — CLOZAPINE 225 MG: 25 TABLET ORAL at 21:35

## 2024-01-31 RX ADMIN — NICOTINE POLACRILEX 4 MG: 4 GUM, CHEWING ORAL at 15:30

## 2024-01-31 RX ADMIN — MELATONIN TAB 3 MG 3 MG: 3 TAB at 21:35

## 2024-01-31 RX ADMIN — POLYETHYLENE GLYCOL 3350 17 G: 17 POWDER, FOR SOLUTION ORAL at 08:17

## 2024-01-31 RX ADMIN — ATROPINE SULFATE 1 DROP: 10 SOLUTION/ DROPS OPHTHALMIC at 21:56

## 2024-01-31 RX ADMIN — SENNOSIDES AND DOCUSATE SODIUM 1 TABLET: 8.6; 5 TABLET ORAL at 17:19

## 2024-01-31 RX ADMIN — METFORMIN HYDROCHLORIDE 500 MG: 500 TABLET ORAL at 08:18

## 2024-01-31 RX ADMIN — MINOCYCLINE HYDROCHLORIDE 100 MG: 100 CAPSULE ORAL at 08:18

## 2024-01-31 RX ADMIN — ESCITALOPRAM OXALATE 20 MG: 10 TABLET ORAL at 08:18

## 2024-01-31 RX ADMIN — NICOTINE POLACRILEX 4 MG: 4 GUM, CHEWING ORAL at 08:18

## 2024-01-31 RX ADMIN — NICOTINE POLACRILEX 4 MG: 4 GUM, CHEWING ORAL at 17:45

## 2024-01-31 RX ADMIN — MINOCYCLINE HYDROCHLORIDE 100 MG: 100 CAPSULE ORAL at 21:56

## 2024-01-31 RX ADMIN — SENNOSIDES AND DOCUSATE SODIUM 1 TABLET: 8.6; 5 TABLET ORAL at 08:18

## 2024-01-31 RX ADMIN — NICOTINE POLACRILEX 4 MG: 4 GUM, CHEWING ORAL at 12:57

## 2024-01-31 RX ADMIN — MIRTAZAPINE 45 MG: 30 TABLET, FILM COATED ORAL at 21:35

## 2024-01-31 NOTE — NURSING NOTE
Pt denies SI, HI and VH. Pt continues to report the ongoing AH. Pt has no complaints or concerns. Pt is calm and cooperative. Pt is isolative to self and room with brief intervals in the milieu for meals and needs. Medication and meal compliant.

## 2024-01-31 NOTE — PROGRESS NOTES
01/31/24 1125   Team Meeting   Meeting Type Daily Rounds   Team Members Present   Team Members Present Physician;Nurse;   Physician Team Member Mark   Nursing Team Member Giancarlo   Care Management Team Member Miguelito   Patient/Family Present   Patient Present No   Patient's Family Present No     Patient currently holds 201 status. Patient denies all symptoms, but continues to report AH. Patient is medication and meal compliant. Provider to increase dose of Clozaril by 25mg. Patient to continue on all current scheduled medications. Patient discharge date and time to be determined.

## 2024-01-31 NOTE — PROGRESS NOTES
"PProgress Note - Behavioral Health     Alberto Berumen 28 y.o. male MRN: [id number]   Unit/Bed#: [facility] 383-02 Encounter: [id number]    Documentation, nursing notes, medication reconciliation, labs, and vitals reviewed. Patient was seen for continuing care and reviewed with treatment team. No acute events over the past 24 hours.  Per nursing report, noted more social.  Pleasant cooperative.  Continues to report auditory hallucinations of voices \"\"threatening to kill me and my family \".  Medication changes over the past 24 hours: We continue to titrate up on his Clozaril. Continues to tolerate current medications with no adverse effects.     On evaluation today, is seen in his room and napping at intervals.  In the morning, more likely to present with low motivation, more visible in the milieu in the afternoon and evenings.  Continues to report no change in his hallucinations.  No suicidal ideation, plan, or intent.  No change in ongoing perceptual disturbances and does not exhibit any symptoms of aimee on evaluation.    Psychiatric ROS:  Behavior over the last 24 hours: unchanged  Sleep: hypersomnia  Appetite: fair  Medication side effects: No   ROS: no complaints, all other systems are negative    Mental Status Evaluation:    Appearance:  age appropriate   Behavior:  cooperative, guarded   Speech:  scant   Mood:  dysphoric   Affect:  blunted   Thought Process:  goal directed   Associations: intact associations   Thought Content:  no overt delusions   Perceptual Disturbances: auditory hallucinations, auditory hallucinations of voices threatening him or his family   Risk Potential: Suicidal ideation - None  Homicidal ideation - None  Potential for aggression - No   Sensorium:  oriented to person, place, and time/date   Memory:  recent and remote memory grossly intact   Consciousness:  alert and awake   Attention/Concentration: decreased concentration and decreased attention span   Insight:  limited   Judgment: limited "   Gait/Station: normal gait/station, normal balance   Motor Activity: no abnormal movements     Vital signs in last 24 hours:    Temp:  [96.9 °F (36.1 °C)-97.4 °F (36.3 °C)] 96.9 °F (36.1 °C)  HR:  [] 92  Resp:  [16-18] 16  BP: (133-161)/(71-91) 161/91    Laboratory results: I have personally reviewed all pertinent laboratory/tests results    Results from the past 24 hours: No results found for this or any previous visit (from the past 24 hour(s)).    Suicide/Homicide Risk Assessment:    Risk of Harm to Self:   Current Specific Risk Factors include: current depressive symptoms, auditory hallucinations  Protective Factors: no current suicidal ideation, ability to communicate with staff on the unit, able to contract for safety on the unit, taking medications as ordered on the unit  Based on today's assessment, Alberto presents the following risk of harm to self: minimal    Risk of Harm to Others:  Current Specific Risk Factors include: current psychotic symptoms  Protective Factors: no current homicidal ideation, able to communicate with staff on the unit, mood is stabilizing, psychotic symptoms are less prominent  Based on today's assessment, Alberto presents the following risk of harm to others: minimal    The following interventions are recommended: behavioral checks every 7 minutes, continued hospitalization on locked unit    Progress Toward Goals: limited progress    Assessment/Plan   Principal Problem:    Schizoaffective disorder, bipolar type (HCC)  Active Problems:    GERD (gastroesophageal reflux disease)    Tobacco abuse    T wave inversion in EKG    Chronic idiopathic constipation    Vitamin B 12 deficiency    Vitamin D deficiency    Acanthosis nigricans    Rash      Recommended Treatment:     Planned medication and treatment changes:    All current active medications have been reviewed  Encourage group therapy, milieu therapy and occupational therapy  Behavioral Health checks every 7  minutes    Increase Clozaril to 225 mg at bedtime  Monitor weekly EKG, CBC, CK, CRP, troponin, BNP for Clozaril monitoring every Monday    Continue all other medications the same    Current Facility-Administered Medications   Medication Dose Route Frequency Provider Last Rate    acetaminophen  650 mg Oral Q6H PRN Yasmin Harvey MD      acetaminophen  650 mg Oral Q4H PRN Yasmin Harvey MD      acetaminophen  975 mg Oral Q6H PRN Yasmin Harvey MD      aluminum-magnesium hydroxide-simethicone  30 mL Oral Q4H PRN Yasmin Harvey MD      atropine  1 drop Sublingual BID Damari Larose MD      haloperidol lactate  2.5 mg Intramuscular Q4H PRN Max 4/day Yasmin Harvey MD      And    LORazepam  1 mg Intramuscular Q4H PRN Max 4/day Yasmin Harvey MD      And    benztropine  0.5 mg Intramuscular Q4H PRN Max 4/day Yasmin Harvey MD      haloperidol lactate  5 mg Intramuscular Q4H PRN Max 4/day Yasmin Harvey MD      And    LORazepam  2 mg Intramuscular Q4H PRN Max 4/day Yasmin Harvey MD      And    benztropine  1 mg Intramuscular Q4H PRN Max 4/day Yasmin Harvey MD      benztropine  1 mg Oral Q4H PRN Max 6/day Yasmin Harvey MD      bisacodyl  10 mg Rectal Daily PRN Yasmin Harvey MD      cloZAPine  225 mg Oral HS HOLLI Anderson      hydrOXYzine HCL  50 mg Oral Q6H PRN Max 4/day Yasmin Harvey MD      Or    diphenhydrAMINE  50 mg Intramuscular Q6H PRN Yasmin Harvey MD      diphenhydrAMINE-zinc acetate   Topical BID PRN Anjel Sagastume MD      escitalopram  20 mg Oral Daily La Flores MD      haloperidol  1 mg Oral Q6H PRN Yasmin Harvey MD      haloperidol  2.5 mg Oral Q4H PRN Max 4/day Yasmin Harvey MD      haloperidol  5 mg Oral Q4H PRN Max 4/day Yasmin Harvey MD      hydrocortisone   Topical 4x Daily PRN HOLLI Monge      hydrOXYzine HCL  100 mg Oral Q6H PRN Max 4/day Yasmin Harvey MD      Or    LORazepam  2 mg Intramuscular Q6H PRN  Yasmin Harvey MD      hydrOXYzine HCL  25 mg Oral Q6H PRN Max 4/day Yasmin Harvey MD      melatonin  3 mg Oral HS Yasmin Harvey MD      metFORMIN  500 mg Oral Daily With Breakfast Eileen HOLLI Higuera      minocycline  100 mg Oral Q12H KAYLIE HOLLI Monge      mirtazapine  45 mg Oral HS La Flores MD      nicotine polacrilex  4 mg Oral Q2H PRN Yasmin Harvey MD      polyethylene glycol  17 g Oral Daily PRN Yasmin Harvey MD      polyethylene glycol  17 g Oral Daily HOLLI Monge      senna-docusate sodium  1 tablet Oral Daily PRN Yasmin Harvey MD      senna-docusate sodium  1 tablet Oral BID La Flores MD      traZODone  50 mg Oral HS PRN Yasmin Harvey MD      white petrolatum-mineral oil   Topical TID PRN Anjel Sagastume MD       Risks / Benefits of Treatment:    Risks, benefits, and possible side effects of medications explained to patient and patient verbalizes understanding and agreement for treatment.    Counseling / Coordination of Care:    Patient's progress discussed with staff in treatment team meeting.  Medications, treatment progress and treatment plan reviewed with patient.    HOLLI Anderson 01/31/24

## 2024-01-31 NOTE — NURSING NOTE
"Patient is visible on unit, walking in the hallway or sitting in dayroom, watching tv and socializing with peers. Patient remains pleasant, cooperative, and compliant with medications. Patient denies SI/HI. Patient denies VH but continues to report auditory hallucinations of voices \"threatening to kill me and my family\". Patient denies depression and anxiety. No complaints or unmet needs identified at this time. Q 7 minute safety checks maintained.   "

## 2024-02-01 PROCEDURE — 99232 SBSQ HOSP IP/OBS MODERATE 35: CPT | Performed by: PSYCHIATRY & NEUROLOGY

## 2024-02-01 RX ADMIN — SENNOSIDES AND DOCUSATE SODIUM 1 TABLET: 8.6; 5 TABLET ORAL at 08:51

## 2024-02-01 RX ADMIN — POLYETHYLENE GLYCOL 3350 17 G: 17 POWDER, FOR SOLUTION ORAL at 08:51

## 2024-02-01 RX ADMIN — HYDROCORTISONE: 1 OINTMENT TOPICAL at 13:05

## 2024-02-01 RX ADMIN — MIRTAZAPINE 45 MG: 30 TABLET, FILM COATED ORAL at 21:38

## 2024-02-01 RX ADMIN — DIPHENHYDRAMINE HYDROCHLORIDE, ZINC ACETATE: 2; .1 CREAM TOPICAL at 13:05

## 2024-02-01 RX ADMIN — MELATONIN TAB 3 MG 3 MG: 3 TAB at 21:38

## 2024-02-01 RX ADMIN — NICOTINE POLACRILEX 4 MG: 4 GUM, CHEWING ORAL at 13:05

## 2024-02-01 RX ADMIN — ESCITALOPRAM OXALATE 20 MG: 10 TABLET ORAL at 08:51

## 2024-02-01 RX ADMIN — MINOCYCLINE HYDROCHLORIDE 100 MG: 100 CAPSULE ORAL at 09:51

## 2024-02-01 RX ADMIN — CLOZAPINE 250 MG: 25 TABLET ORAL at 21:38

## 2024-02-01 RX ADMIN — Medication: at 13:05

## 2024-02-01 RX ADMIN — SENNOSIDES AND DOCUSATE SODIUM 1 TABLET: 8.6; 5 TABLET ORAL at 17:46

## 2024-02-01 RX ADMIN — NICOTINE POLACRILEX 4 MG: 4 GUM, CHEWING ORAL at 15:55

## 2024-02-01 RX ADMIN — METFORMIN HYDROCHLORIDE 500 MG: 500 TABLET ORAL at 08:51

## 2024-02-01 RX ADMIN — MINOCYCLINE HYDROCHLORIDE 100 MG: 100 CAPSULE ORAL at 21:48

## 2024-02-01 RX ADMIN — NICOTINE POLACRILEX 4 MG: 4 GUM, CHEWING ORAL at 18:19

## 2024-02-01 NOTE — NURSING NOTE
"PT observed in bed, cooperative and calm on approach. PT denies SI/HI/VH, continues to report AH, stated \"voices remain same without any changes even thought less intensive\".  PT compliant with HS meds with no PRNs needed on this shift.    "

## 2024-02-01 NOTE — PROGRESS NOTES
Progress Note - Behavioral Health     Alberto Berumen 28 y.o. male MRN: [id number]   Unit/Bed#: [facility] 383-02 Encounter: [id number]    Documentation, nursing notes, medication reconciliation, labs, and vitals reviewed. Patient was seen for continuing care and reviewed with treatment team. No acute events over the past 24 hours.  Per nursing report, calm and cooperative stated voices remained the same without any changes although less intense.    Medication changes over the past 24 hours:  We continue to titrate up on Clozaril as tolerated. Continues to tolerate current medications with no adverse effects.     On evaluation today, he is again seen in his room and resting in bed.  Continues with intermittent poor motivation and isolative, at times more social on the unit.  Continues to endorse minimal change or improvement with Clozaril.  I have reviewed with him plan to continue to titrate up as tolerated.    No suicidal ideation, plan, or intent.  Ongoing  perceptual disturbances and does not exhibit any symptoms of aimee on evaluation.    Psychiatric ROS:  Behavior over the last 24 hours: unchanged  Sleep: hypersomnia  Appetite: fair  Medication side effects: No   ROS: no complaints, all other systems are negative    Mental Status Evaluation:    Appearance:  marginal hygiene   Behavior:  cooperative, guarded   Speech:  slow   Mood:  depressed   Affect:  flat   Thought Process:  goal directed   Associations: concrete associations   Thought Content:  no overt delusions   Perceptual Disturbances: auditory hallucinations, continued to threaten him and his family   Risk Potential: Suicidal ideation - None  Homicidal ideation - None  Potential for aggression - No   Sensorium:  oriented to person, place, and time/date   Memory:  recent and remote memory grossly intact   Consciousness:  alert and awake   Attention/Concentration: attention span and concentration appear shorter than expected for age   Insight:  limited    Judgment: limited   Gait/Station: normal gait/station, normal balance   Motor Activity: no abnormal movements     Vital signs in last 24 hours:    Temp:  [97.3 °F (36.3 °C)-97.4 °F (36.3 °C)] 97.3 °F (36.3 °C)  HR:  [] 98  Resp:  [17-18] 17  BP: (114-132)/(74-84) 124/74    Laboratory results: I have personally reviewed all pertinent laboratory/tests results    Results from the past 24 hours: No results found for this or any previous visit (from the past 24 hour(s)).    Suicide/Homicide Risk Assessment:    Risk of Harm to Self:   Current Specific Risk Factors include: mental illness diagnosis  Protective Factors: no current suicidal ideation, ability to communicate with staff on the unit, able to contract for safety on the unit, taking medications as ordered on the unit  Based on today's assessment, Alberto presents the following risk of harm to self: minimal    Risk of Harm to Others:  Current Specific Risk Factors include: current psychotic symptoms  Protective Factors: no current homicidal ideation  Based on today's assessment, Alberto presents the following risk of harm to others: minimal    The following interventions are recommended: behavioral checks every 7 minutes, continued hospitalization on locked unit    Progress Toward Goals: progressing    Assessment/Plan   Principal Problem:    Schizoaffective disorder, bipolar type (HCC)  Active Problems:    GERD (gastroesophageal reflux disease)    Tobacco abuse    T wave inversion in EKG    Chronic idiopathic constipation    Vitamin B 12 deficiency    Vitamin D deficiency    Acanthosis nigricans    Rash      Recommended Treatment:     Planned medication and treatment changes:    All current active medications have been reviewed  Encourage group therapy, milieu therapy and occupational therapy  Behavioral Health checks every 7 minutes  Increase Clozaril to 250 mg tonight for ongoing psychosis  Monitor weekly EKG, CBC, CK, CRP, BNP, troponin on  Mondays  Continue all other medications:    Current Facility-Administered Medications   Medication Dose Route Frequency Provider Last Rate    acetaminophen  650 mg Oral Q6H PRN Yasmin Harvey MD      acetaminophen  650 mg Oral Q4H PRN Yasmin Harvey MD      acetaminophen  975 mg Oral Q6H PRN Yasmin Harvey MD      aluminum-magnesium hydroxide-simethicone  30 mL Oral Q4H PRN Yasmin Harvey MD      atropine  1 drop Sublingual BID Damari Larose MD      haloperidol lactate  2.5 mg Intramuscular Q4H PRN Max 4/day Yasmin Harvey MD      And    LORazepam  1 mg Intramuscular Q4H PRN Max 4/day Yasmin Harvey MD      And    benztropine  0.5 mg Intramuscular Q4H PRN Max 4/day Yasmin Harvey MD      haloperidol lactate  5 mg Intramuscular Q4H PRN Max 4/day Yasmin Harvey MD      And    LORazepam  2 mg Intramuscular Q4H PRN Max 4/day Yasmin Harvey MD      And    benztropine  1 mg Intramuscular Q4H PRN Max 4/day Yasmin Harvey MD      benztropine  1 mg Oral Q4H PRN Max 6/day Yasmin Harvey MD      bisacodyl  10 mg Rectal Daily PRN Yasmin Harvey MD      cloZAPine  250 mg Oral HS HOLLI Anderson      hydrOXYzine HCL  50 mg Oral Q6H PRN Max 4/day Yasmin Harvey MD      Or    diphenhydrAMINE  50 mg Intramuscular Q6H PRN Yasmin Harvey MD      diphenhydrAMINE-zinc acetate   Topical BID PRN Anjel Sagastume MD      escitalopram  20 mg Oral Daily La Flores MD      haloperidol  1 mg Oral Q6H PRN Yasmin Harvey MD      haloperidol  2.5 mg Oral Q4H PRN Max 4/day Yasmin Harvey MD      haloperidol  5 mg Oral Q4H PRN Max 4/day Yasmin Harvey MD      hydrocortisone   Topical 4x Daily PRN HOLLI Monge      hydrOXYzine HCL  100 mg Oral Q6H PRN Max 4/day Yasmin Harvey MD      Or    LORazepam  2 mg Intramuscular Q6H PRN Yasmin Harvey MD      hydrOXYzine HCL  25 mg Oral Q6H PRN Max 4/day Yasmin Harvey MD      melatonin  3 mg Oral HS Yasmin Harvey,  MD      metFORMIN  500 mg Oral Daily With Breakfast HOLLI Monge      minocycline  100 mg Oral Q12H [facility] HOLLI Monge      mirtazapine  45 mg Oral HS La Flores MD      nicotine polacrilex  4 mg Oral Q2H PRN Yasmin Harvey MD      polyethylene glycol  17 g Oral Daily PRN Yasmin Harvey MD      polyethylene glycol  17 g Oral Daily HOLLI Monge      senna-docusate sodium  1 tablet Oral Daily PRN Yasmin Harvey MD      senna-docusate sodium  1 tablet Oral BID La Flores MD      traZODone  50 mg Oral HS PRN Yasmin Harvey MD      white petrolatum-mineral oil   Topical TID PRN Anjel Sagastume MD       Risks / Benefits of Treatment:    Risks, benefits, and possible side effects of medications explained to patient and patient verbalizes understanding and agreement for treatment.    Counseling / Coordination of Care:    Patient's progress discussed with staff in treatment team meeting.  Medications, treatment progress and treatment plan reviewed with patient.    HOLLI Anderson 02/01/24

## 2024-02-01 NOTE — NURSING NOTE
PT is isolative to room and self. Denies SI/HI/and VH. PT continues to report AH. Offers no complaints at this time. Med and meal compliant.

## 2024-02-01 NOTE — PROGRESS NOTES
02/01/24 1509   Team Meeting   Meeting Type Daily Rounds   Team Members Present   Team Members Present Physician;Nurse;   Physician Team Member Mark   Nursing Team Member Giancarlo   Care Management Team Member Miguelito   Patient/Family Present   Patient Present No   Patient's Family Present No     Patient currently holds 201 status. Patient reports continued AH but denies all other symptoms. Patient is medication and meal compliant. Patient to continue on Clozaril, provider to increase to 250mg. Patient discharge date and time to be determined.

## 2024-02-02 PROCEDURE — 99232 SBSQ HOSP IP/OBS MODERATE 35: CPT | Performed by: PSYCHIATRY & NEUROLOGY

## 2024-02-02 RX ADMIN — NICOTINE POLACRILEX 4 MG: 4 GUM, CHEWING ORAL at 15:59

## 2024-02-02 RX ADMIN — MELATONIN TAB 3 MG 3 MG: 3 TAB at 21:31

## 2024-02-02 RX ADMIN — MINOCYCLINE HYDROCHLORIDE 100 MG: 100 CAPSULE ORAL at 08:38

## 2024-02-02 RX ADMIN — ESCITALOPRAM OXALATE 20 MG: 10 TABLET ORAL at 08:38

## 2024-02-02 RX ADMIN — POLYETHYLENE GLYCOL 3350 17 G: 17 POWDER, FOR SOLUTION ORAL at 08:40

## 2024-02-02 RX ADMIN — METFORMIN HYDROCHLORIDE 500 MG: 500 TABLET ORAL at 08:38

## 2024-02-02 RX ADMIN — SENNOSIDES AND DOCUSATE SODIUM 1 TABLET: 8.6; 5 TABLET ORAL at 08:38

## 2024-02-02 RX ADMIN — NICOTINE POLACRILEX 4 MG: 4 GUM, CHEWING ORAL at 13:05

## 2024-02-02 RX ADMIN — MINOCYCLINE HYDROCHLORIDE 100 MG: 100 CAPSULE ORAL at 21:31

## 2024-02-02 RX ADMIN — MIRTAZAPINE 45 MG: 30 TABLET, FILM COATED ORAL at 21:31

## 2024-02-02 RX ADMIN — CLOZAPINE 250 MG: 25 TABLET ORAL at 21:31

## 2024-02-02 NOTE — NURSING NOTE
PT is calm and isolative to self and room. Slept most of the morning. Refused breakfast. Denies SI/HI and VH. PT continues to report AH. Refused his Atropine drops. Offers no complaints.

## 2024-02-02 NOTE — PROGRESS NOTES
02/02/24 1426   Team Meeting   Meeting Type Daily Rounds   Team Members Present   Team Members Present Physician;Nurse;   Physician Team Member Mark   Nursing Team Member Giancarlo   Care Management Team Member Miguelito   Patient/Family Present   Patient Present No   Patient's Family Present No     Patient currently holds 201 status. Patient continues to report AH but denies all other symptoms. Patient is medication and meal compliant. Patient to continue on Clozaril. Staff to obtain level over duration of the weekend. Patient discharge date and time is to be determined.

## 2024-02-02 NOTE — PROGRESS NOTES
Progress Note - Behavioral Health     Alberto Berumen 28 y.o. male MRN: [id number]   Unit/Bed#: [facility] 383-02 Encounter: [id number]    Documentation, nursing notes, medication reconciliation, labs, and vitals reviewed. Patient was seen for continuing care and reviewed with treatment team. No acute events over the past 24 hours.  Per nursing report, isolative to his room today.  Napping off and on this morning and refused breakfast.  Continues to report auditory hallucinations.    Medication changes over the past 24 hours: Clozaril was increased to 250 mg last night. Continues to tolerate current medications, complains of some drooling but will intermittently refuses atropine drops.    On evaluation today, he is seen in his room and napping in bed.  States he will nap during the day to avoid auditory hallucinations.  States he may be noticing some decrease in intensity of auditory hallucinations, but no decrease in frequency.  Voices continued to threaten him and his family.  Depression will wax and wane throughout the day, at times feels his depression is improving but not sustained.  He is aware of plan to continue to titrate up on his Clozaril as tolerated, no change today due to elevated heart rate.  Will reevaluate on Monday.    No suicidal ideation, plan, or intent. Ongoing perceptual disturbances and does not exhibit any symptoms of aimee on evaluation.    Psychiatric ROS:  Behavior over the last 24 hours: unchanged  Sleep: hypersomnia  Appetite: fair  Medication side effects: No   ROS: no complaints, all other systems are negative    Mental Status Evaluation:    Appearance:  marginal hygiene   Behavior:  cooperative, guarded   Speech:  slow, scant   Mood:  dysphoric   Affect:  flat   Thought Process:  decreased rate of thoughts   Associations: concrete associations   Thought Content:  P[employer]al preoccupation   Perceptual Disturbances: auditory hallucinations of voices threatening to kill him or his family, or  "telling him he could \"go to hell   Risk Potential: Suicidal ideation - None  Homicidal ideation - None  Potential for aggression - No   Sensorium:  oriented to person, place, and time/date   Memory:  recent and remote memory grossly intact   Consciousness:  alert and awake   Attention/Concentration: decreased concentration and decreased attention span   Insight:  limited   Judgment: limited   Gait/Station: normal gait/station, normal balance   Motor Activity: no abnormal movements     Vital signs in last 24 hours:    Temp:  [97.6 °F (36.4 °C)] 97.6 °F (36.4 °C)  HR:  [106-112] 106  Resp:  [16] 16  BP: (127-129)/(84-85) 127/85    Laboratory results: I have personally reviewed all pertinent laboratory/tests results    Results from the past 24 hours: No results found for this or any previous visit (from the past 24 hour(s)).    Suicide/Homicide Risk Assessment:    Risk of Harm to Self:   Current Specific Risk Factors include: current depressive symptoms, current psychotic symptoms  Protective Factors: no current suicidal ideation, ability to communicate with staff on the unit, able to contract for safety on the unit, taking medications as ordered on the unit  Based on today's assessment, Alberto presents the following risk of harm to self: minimal    Risk of Harm to Others:  Current Specific Risk Factors include: current psychotic symptoms  Protective Factors: no current homicidal ideation, impulse control is improving, psychotic symptoms are less prominent  Based on today's assessment, Alberto presents the following risk of harm to others: minimal    The following interventions are recommended: behavioral checks every 7 minutes, continued hospitalization on locked unit    Progress Toward Goals: no significant improvement, still very depressed, slightly less psychotic    Assessment/Plan   Principal Problem:    Schizoaffective disorder, bipolar type (HCC)  Active Problems:    GERD (gastroesophageal reflux disease)    " Tobacco abuse    T wave inversion in EKG    Chronic idiopathic constipation    Vitamin B 12 deficiency    Vitamin D deficiency    Acanthosis nigricans    Rash      Recommended Treatment:     Planned medication and treatment changes:    All current active medications have been reviewed  Encourage group therapy, milieu therapy and occupational therapy  Behavioral Health checks every 7 minutes  Continue current Clozaril dose of 250 mg at bedtime for treatment of psychotic symptoms. No increase planned for today as his heart rate is increased above 100    Monitor CBC/diff, CPK, C-reactive protein, BNP, troponin, and ECG weekly on Monday for Clozaril monitoring    Continue all other medications:    Current Facility-Administered Medications   Medication Dose Route Frequency Provider Last Rate    acetaminophen  650 mg Oral Q6H PRN Yasmin Harvey MD      acetaminophen  650 mg Oral Q4H PRN Yasmin Harvey MD      acetaminophen  975 mg Oral Q6H PRN Yasmin Harvey MD      aluminum-magnesium hydroxide-simethicone  30 mL Oral Q4H PRN Yasmin Harvey MD      atropine  1 drop Sublingual BID Damari Larose MD      haloperidol lactate  2.5 mg Intramuscular Q4H PRN Max 4/day Yasmin Harvey MD      And    LORazepam  1 mg Intramuscular Q4H PRN Max 4/day Yasmin Harvey MD      And    benztropine  0.5 mg Intramuscular Q4H PRN Max 4/day Yasmin Harvey MD      haloperidol lactate  5 mg Intramuscular Q4H PRN Max 4/day Yasmin Harvey MD      And    LORazepam  2 mg Intramuscular Q4H PRN Max 4/day Yasmin Harvey MD      And    benztropine  1 mg Intramuscular Q4H PRN Max 4/day Yasmin Harvey MD      benztropine  1 mg Oral Q4H PRN Max 6/day Yasmin Harvey MD      bisacodyl  10 mg Rectal Daily PRN Yasmin Harvey MD      cloZAPine  250 mg Oral HS HOLLI Anderson      hydrOXYzine HCL  50 mg Oral Q6H PRN Max 4/day Yasmin Harvey MD      Or    diphenhydrAMINE  50 mg Intramuscular Q6H PRN Yasmin Harvey  MD      diphenhydrAMINE-zinc acetate   Topical BID PRN Anjel Sagastume MD      escitalopram  20 mg Oral Daily La Flores MD      haloperidol  1 mg Oral Q6H PRN Yasmin Harvey MD      haloperidol  2.5 mg Oral Q4H PRN Max 4/day Yasmin Harvey MD      haloperidol  5 mg Oral Q4H PRN Max 4/day Yasmin Harvey MD      hydrocortisone   Topical 4x Daily PRN HOLLI Monge      hydrOXYzine HCL  100 mg Oral Q6H PRN Max 4/day Yasmin Harvey MD      Or    LORazepam  2 mg Intramuscular Q6H PRN Yasmin Harvey MD      hydrOXYzine HCL  25 mg Oral Q6H PRN Max 4/day Yasmin Harvey MD      melatonin  3 mg Oral HS Yasmin Harvey MD      metFORMIN  500 mg Oral Daily With Breakfast HOLLI Monge      minocycline  100 mg Oral Q12H KAYLIE HOLLI Monge      mirtazapine  45 mg Oral HS La Flores MD      nicotine polacrilex  4 mg Oral Q2H PRN Yasmin Harvey MD      polyethylene glycol  17 g Oral Daily PRN Yasmin Harvey MD      polyethylene glycol  17 g Oral Daily HOLLI Monge      senna-docusate sodium  1 tablet Oral Daily PRN Yasmin Harvey MD      senna-docusate sodium  1 tablet Oral BID La Flores MD      traZODone  50 mg Oral HS PRN Yasmin Harvey MD      white petrolatum-mineral oil   Topical TID PRN Anjel Sagastume MD       Risks / Benefits of Treatment:    Risks, benefits, and possible side effects of medications explained to patient and patient verbalizes understanding and agreement for treatment.    Counseling / Coordination of Care:    Patient's progress discussed with staff in treatment team meeting.  Medications, treatment progress and treatment plan reviewed with patient.    HOLLI Anderson 02/02/24

## 2024-02-03 PROCEDURE — 99232 SBSQ HOSP IP/OBS MODERATE 35: CPT | Performed by: STUDENT IN AN ORGANIZED HEALTH CARE EDUCATION/TRAINING PROGRAM

## 2024-02-03 RX ADMIN — SENNOSIDES AND DOCUSATE SODIUM 1 TABLET: 8.6; 5 TABLET ORAL at 17:04

## 2024-02-03 RX ADMIN — SENNOSIDES AND DOCUSATE SODIUM 1 TABLET: 8.6; 5 TABLET ORAL at 09:00

## 2024-02-03 RX ADMIN — ESCITALOPRAM OXALATE 20 MG: 10 TABLET ORAL at 09:00

## 2024-02-03 RX ADMIN — MIRTAZAPINE 45 MG: 30 TABLET, FILM COATED ORAL at 21:12

## 2024-02-03 RX ADMIN — NICOTINE POLACRILEX 4 MG: 4 GUM, CHEWING ORAL at 18:01

## 2024-02-03 RX ADMIN — MINOCYCLINE HYDROCHLORIDE 100 MG: 100 CAPSULE ORAL at 21:13

## 2024-02-03 RX ADMIN — MELATONIN TAB 3 MG 3 MG: 3 TAB at 21:12

## 2024-02-03 RX ADMIN — POLYETHYLENE GLYCOL 3350 17 G: 17 POWDER, FOR SOLUTION ORAL at 09:01

## 2024-02-03 RX ADMIN — NICOTINE POLACRILEX 4 MG: 4 GUM, CHEWING ORAL at 13:39

## 2024-02-03 RX ADMIN — MINOCYCLINE HYDROCHLORIDE 100 MG: 100 CAPSULE ORAL at 09:00

## 2024-02-03 RX ADMIN — CLOZAPINE 250 MG: 25 TABLET ORAL at 21:12

## 2024-02-03 RX ADMIN — METFORMIN HYDROCHLORIDE 500 MG: 500 TABLET ORAL at 09:00

## 2024-02-03 RX ADMIN — ATROPINE SULFATE 1 DROP: 10 SOLUTION/ DROPS OPHTHALMIC at 21:12

## 2024-02-03 NOTE — PROGRESS NOTES
"Progress Note - Behavioral Health   Alberto Berumen 28 y.o. male MRN: [id number]  Unit/Bed#: [facility] 383-02 Encounter: [id number]    All documentation, nursing notes, labs, and vitals reviewed.  The patient's medication reconciliation chart was also analyzed for medication adherence.  I personally evaluated Alberto Berumen and discussed current care with treatment team. No acute events overnight.     On examination, Alberto is mostly uncooperative and guarded. He denies current medication concerns. He has been mostly seclusive to his room. He reports hypersomnia, fatigue, and amotivation. His appetite is stable. No SI/HI today. Alberto fails to engage regarding plans for discharge and life goals after leaving the hospital. His focus/concentration are poor. His cognitive status is limited and he lacks insight into his disease process. Alberto denies new onset anxiety or panic symptoms. He is not tense or on-edge today. During today's examination, Alberto does not exhibit objective evidence of aimee/hypomania. Alberto continues to endorse perceptual disturbances, such as AH, paranoia, and delusional beliefs that he will perish in \"hell\". He is without current ideas of reference or Schneiderian symptoms. Alberto offers no further concerns.     Mental Status Evaluation:    Appearance:  age appropriate, casually dressed, dressed appropriately, adequate grooming, looks stated age, overweight   Behavior:  bizarre, guarded, uncooperative, poor eye contact   Speech:  slow, scant, soft   Mood:  dysphoric   Affect:  flat   Thought Process:  concrete, poverty of thought   Associations: concrete associations   Thought Content:  persecutory delusions, paranoid thoughts   Perceptual Disturbances: no visual hallucinations, auditory hallucinations   Risk Potential: Suicidal ideation - None at present  Homicidal ideation - None at present  Potential for aggression - Yes, due to acute psychosis   Sensorium:  unable to assess   Memory:  " recent and remote memory: unable to assess due to lack of cooperation   Consciousness:  alert and awake    Attention: attention span and concentration appear shorter than expected for age   Insight:  poor   Judgment: poor   Gait/Station: in bed   Motor Activity: no abnormal movements       Assessment:     Principal Problem:    Schizoaffective disorder, bipolar type ([facility])  Active Problems:    GERD (gastroesophageal reflux disease)    Tobacco abuse    T wave inversion in EKG    Chronic idiopathic constipation    Vitamin B 12 deficiency    Vitamin D deficiency    Acanthosis nigricans    Rash      Plan/Recommended Treatment:     - Continue with pharmacotherapy, group therapy, milieu therapy and occupational therapy.    - Risks/benefits/alternatives to treatment discussed and Alberto Berumen continues to verbalize understanding   - No psychopharmacologic changes necessary at this juncture, continue scheduled psychotropic agents at current doses (see below)   - Will consider further optimization of psychotropic medication regimen as hospital course progresses   - Continue to assess for adverse medication side effects.  - Medical management as per SLIM recs  - Encourage Alberto Berumen to participate in nonverbal forms of therapy including journaling and art/music therapy  - Continue precautionary Q7-minute safety checks.  - Continue to engage case management/SW to assist with collateral information, discharge planning, and the implementation of an individualized, patient-centered plan of care.  - The patient will be maintained on the following medications:    Current Facility-Administered Medications   Medication Dose Route Frequency Provider Last Rate    acetaminophen  650 mg Oral Q6H PRN Yasmin Harvey MD      acetaminophen  650 mg Oral Q4H PRN Yasmin Harvey MD      acetaminophen  975 mg Oral Q6H PRN Yasmin Harvey MD      aluminum-magnesium hydroxide-simethicone  30 mL Oral Q4H PRN Yasmin Harvey MD       atropine  1 drop Sublingual BID Damari Larose MD      haloperidol lactate  2.5 mg Intramuscular Q4H PRN Max 4/day Yasmin Harvey MD      And    LORazepam  1 mg Intramuscular Q4H PRN Max 4/day Yasmin Harvey MD      And    benztropine  0.5 mg Intramuscular Q4H PRN Max 4/day Yasmin Harvey MD      haloperidol lactate  5 mg Intramuscular Q4H PRN Max 4/day Yasmin Harvey MD      And    LORazepam  2 mg Intramuscular Q4H PRN Max 4/day Yasmin Harvey MD      And    benztropine  1 mg Intramuscular Q4H PRN Max 4/day Yasmin Harvey MD      benztropine  1 mg Oral Q4H PRN Max 6/day Yasmin Harvey MD      bisacodyl  10 mg Rectal Daily PRN Yasmin Harvey MD      cloZAPine  250 mg Oral HS HOLLI Anderson      hydrOXYzine HCL  50 mg Oral Q6H PRN Max 4/day Yasmin Harvey MD      Or    diphenhydrAMINE  50 mg Intramuscular Q6H PRN Yasmin Harvey MD      diphenhydrAMINE-zinc acetate   Topical BID PRN Anjel Sagastume MD      escitalopram  20 mg Oral Daily La Flores MD      haloperidol  1 mg Oral Q6H PRN Yasmin Harvey MD      haloperidol  2.5 mg Oral Q4H PRN Max 4/day Yasmin Harvey MD      haloperidol  5 mg Oral Q4H PRN Max 4/day Yasmin Harvey MD      hydrocortisone   Topical 4x Daily PRN HOLLI Monge      hydrOXYzine HCL  100 mg Oral Q6H PRN Max 4/day Yasmin Harvey MD      Or    LORazepam  2 mg Intramuscular Q6H PRN Yasmin Harvey MD      hydrOXYzine HCL  25 mg Oral Q6H PRN Max 4/day Yasmin Harvey MD      melatonin  3 mg Oral HS Yasmin Harvey MD      metFORMIN  500 mg Oral Daily With Breakfast HOLLI Monge      minocycline  100 mg Oral Q12H KAYLIE HOLLI Monge      mirtazapine  45 mg Oral HS La Flores MD      nicotine polacrilex  4 mg Oral Q2H PRN Yasmin Harvey MD      polyethylene glycol  17 g Oral Daily PRN Yasmin Harvey MD      polyethylene glycol  17 g Oral Daily HOLLI Monge       senna-docusate sodium  1 tablet Oral Daily PRN Yasmin Harvey MD      senna-docusate sodium  1 tablet Oral BID La Flores MD      traZODone  50 mg Oral HS PRN Yasmin Harvey MD      white petrolatum-mineral oil   Topical TID PRN Anjel Sagastume MD

## 2024-02-03 NOTE — NURSING NOTE
Pt calm, cooperative. Isolative to self and room. Visible for meals. Compliant with scheduled medications, declined breakfast. Denies SI/HI/VH, continues to report AH of harm towards self and family. Reports adequate sleep. Denies any unmet needs at this time. Q7 minute safety checks maintained.

## 2024-02-03 NOTE — NURSING NOTE
Pt calm and cooperative, Pt pleasant upon approach, Pt visible on the unit seen in the dayroom watching TV and pacing the unit with peers, Pt denied all pain anxiety and depression, Pt denies HI and SI , Pt denied AH and VH, Pt took all scheduled HS meds, Q7 min safety checks maintained

## 2024-02-04 PROCEDURE — 99232 SBSQ HOSP IP/OBS MODERATE 35: CPT | Performed by: STUDENT IN AN ORGANIZED HEALTH CARE EDUCATION/TRAINING PROGRAM

## 2024-02-04 RX ADMIN — NICOTINE POLACRILEX 4 MG: 4 GUM, CHEWING ORAL at 12:47

## 2024-02-04 RX ADMIN — SENNOSIDES AND DOCUSATE SODIUM 1 TABLET: 8.6; 5 TABLET ORAL at 17:31

## 2024-02-04 RX ADMIN — POLYETHYLENE GLYCOL 3350 17 G: 17 POWDER, FOR SOLUTION ORAL at 08:25

## 2024-02-04 RX ADMIN — ESCITALOPRAM OXALATE 20 MG: 10 TABLET ORAL at 08:25

## 2024-02-04 RX ADMIN — CLOZAPINE 250 MG: 25 TABLET ORAL at 21:29

## 2024-02-04 RX ADMIN — NICOTINE POLACRILEX 4 MG: 4 GUM, CHEWING ORAL at 18:42

## 2024-02-04 RX ADMIN — MINOCYCLINE HYDROCHLORIDE 100 MG: 100 CAPSULE ORAL at 08:25

## 2024-02-04 RX ADMIN — MIRTAZAPINE 45 MG: 30 TABLET, FILM COATED ORAL at 21:29

## 2024-02-04 RX ADMIN — ATROPINE SULFATE 1 DROP: 10 SOLUTION/ DROPS OPHTHALMIC at 21:30

## 2024-02-04 RX ADMIN — NICOTINE POLACRILEX 4 MG: 4 GUM, CHEWING ORAL at 15:53

## 2024-02-04 RX ADMIN — SENNOSIDES AND DOCUSATE SODIUM 1 TABLET: 8.6; 5 TABLET ORAL at 08:25

## 2024-02-04 RX ADMIN — MELATONIN TAB 3 MG 3 MG: 3 TAB at 21:29

## 2024-02-04 RX ADMIN — MINOCYCLINE HYDROCHLORIDE 100 MG: 100 CAPSULE ORAL at 21:30

## 2024-02-04 RX ADMIN — METFORMIN HYDROCHLORIDE 500 MG: 500 TABLET ORAL at 08:25

## 2024-02-04 NOTE — PROGRESS NOTES
"Progress Note - Behavioral Health   Alberto Berumen 28 y.o. male MRN: [id number]  Unit/Bed#: [facility] 383-02 Encounter: [id number]    All documentation, nursing notes, labs, and vitals reviewed.  The patient's medication reconciliation chart was also analyzed for medication adherence.  I personally evaluated Alberto Berumen and discussed current care with treatment team. No acute events overnight.     On examination, Alberto is resting peacefully. He reports adequate sleep and appetite. His energy is limited. He denies overt SI/HI when asked. He states he feels \"about the same\" as he did yesterday. No recent crying spells or feelings of hopelessness. Alberto is not tense, on-edge, or restless today. No panic symptoms over the last 24 hours. During today's examination, Alberto does not exhibit objective evidence of aimee/hypomania or psychosis. Alberto is not currently irritable, grandiose, pressured in speech, impulsive, or pathologically labile. Alberto denies visual hallucinations today. He remains paranoid, delusional (\"I'm going to HELL\") and continues to endorse AH. Alberto offers no further concerns.     Mental Status Evaluation:    Appearance:  marginal hygiene, looks stated age, overweight   Behavior:  bizarre, guarded   Speech:  slow, scant   Mood:  \" The same\"   Affect:  blunted   Thought Process:  concrete, poverty of thought   Associations: concrete associations   Thought Content:  bizarre delusions, paranoid thoughts   Perceptual Disturbances: no visual hallucinations, auditory hallucinations   Risk Potential: Suicidal ideation - None at present  Homicidal ideation - None at present  Potential for aggression - Yes, due to poor impulse control   Sensorium:  unable to assess   Memory:  recent and remote memory: unable to assess due to lack of cooperation   Consciousness:  alert and awake    Attention: attention span and concentration appear shorter than expected for age   Insight:  limited   Judgment: limited "   Gait/Station: in bed   Motor Activity: no abnormal movements       Assessment:     Principal Problem:    Schizoaffective disorder, bipolar type ([facility])  Active Problems:    GERD (gastroesophageal reflux disease)    Tobacco abuse    T wave inversion in EKG    Chronic idiopathic constipation    Vitamin B 12 deficiency    Vitamin D deficiency    Acanthosis nigricans    Rash      Plan/Recommended Treatment:     - Continue with pharmacotherapy, group therapy, milieu therapy and occupational therapy.    - Risks/benefits/alternatives to treatment discussed and Alberto Berumen continues to verbalize understanding    - Alberto would likely benefit from optimization of Clozapine but remains tachycardic. Will pursue completion of cardiac labs prior to dose adjustment   - No psychopharmacologic changes necessary at this juncture, continue scheduled psychotropic agents at current doses (see below)   - Will consider further optimization of psychotropic medication regimen as hospital course progresses   - Continue to assess for adverse medication side effects.  - Medical management as per SLIM recs  - Encourage Alberto Berumen to participate in nonverbal forms of therapy including journaling and art/music therapy  - Continue precautionary Q7-minute safety checks.  - Continue to engage case management/SW to assist with collateral information, discharge planning, and the implementation of an individualized, patient-centered plan of care.  - The patient will be maintained on the following medications:    Current Facility-Administered Medications   Medication Dose Route Frequency Provider Last Rate    acetaminophen  650 mg Oral Q6H PRN Yasmin Harvey MD      acetaminophen  650 mg Oral Q4H PRN Yasmin Harvey MD      acetaminophen  975 mg Oral Q6H PRN Yasmin Harvey MD      aluminum-magnesium hydroxide-simethicone  30 mL Oral Q4H PRN Yasmin Harvey MD      atropine  1 drop Sublingual BID Damari Larose MD       haloperidol lactate  2.5 mg Intramuscular Q4H PRN Max 4/day Yasmin Harvey MD      And    LORazepam  1 mg Intramuscular Q4H PRN Max 4/day Yasmin Harvey MD      And    benztropine  0.5 mg Intramuscular Q4H PRN Max 4/day Yasmin Harvey MD      haloperidol lactate  5 mg Intramuscular Q4H PRN Max 4/day Yasmin Harvey MD      And    LORazepam  2 mg Intramuscular Q4H PRN Max 4/day Yasmin Harvey MD      And    benztropine  1 mg Intramuscular Q4H PRN Max 4/day Yasmin Harvey MD      benztropine  1 mg Oral Q4H PRN Max 6/day Yasmin Harvey MD      bisacodyl  10 mg Rectal Daily PRN Yasmin Harvey MD      cloZAPine  250 mg Oral HS HOLLI Anderson      hydrOXYzine HCL  50 mg Oral Q6H PRN Max 4/day Yasmin Harvey MD      Or    diphenhydrAMINE  50 mg Intramuscular Q6H PRN Yasmin Harvey MD      diphenhydrAMINE-zinc acetate   Topical BID PRN Anjel Sagastume MD      escitalopram  20 mg Oral Daily La Flores MD      haloperidol  1 mg Oral Q6H PRN Yasmin Harvey MD      haloperidol  2.5 mg Oral Q4H PRN Max 4/day Yasmin Harvey MD      haloperidol  5 mg Oral Q4H PRN Max 4/day Yasmin Harvey MD      hydrocortisone   Topical 4x Daily PRN HOLLI Monge      hydrOXYzine HCL  100 mg Oral Q6H PRN Max 4/day Yasmin Harvey MD      Or    LORazepam  2 mg Intramuscular Q6H PRN Yasmin Harvey MD      hydrOXYzine HCL  25 mg Oral Q6H PRN Max 4/day Yasmin Harvey MD      melatonin  3 mg Oral HS Yasmin Harvey MD      metFORMIN  500 mg Oral Daily With Breakfast HOLLI Monge      minocycline  100 mg Oral Q12H [facility] HOLLI Monge      mirtazapine  45 mg Oral HS La Flores MD      nicotine polacrilex  4 mg Oral Q2H PRN Yasmin Harvey MD      polyethylene glycol  17 g Oral Daily PRJOHN Harvey MD      polyethylene glycol  17 g Oral Daily HOLLI Monge      senna-docusate sodium  1 tablet Oral Daily PRJOHN JACKMAN  MD kelsea Harvey-docusate sodium  1 tablet Oral BID La Flores MD      traZODone  50 mg Oral HS PRN Yasmin Harvey MD      white petrolatum-mineral oil   Topical TID PRN Anjel Sagastume MD

## 2024-02-04 NOTE — NURSING NOTE
"Patient was isolative to their room this evening. Reports some depression and when asked what they did to overcome their depression today patient reported that they attended art group. Patient also reports auditory hallucinations that \"they are going to kill me and my family\". Denies anxiety, SI, and HI. Medication compliant. Denies any unmet needs or concerns at this time.   "

## 2024-02-04 NOTE — NURSING NOTE
Pt observed in milieu and dayroom. Pt is social with peers, cooperative and pleasant with staff. Pt is wearing clothing from home. Medication and meal compliant. Pt denies SI/HI/VH, or pain. Pt still endorses AH of negative voices but they are manageable with out intervention. No unmet needs this shift.

## 2024-02-05 LAB
ATRIAL RATE: 91 BPM
ATRIAL RATE: 93 BPM
BASOPHILS # BLD AUTO: 0.05 THOUSANDS/ÂΜL (ref 0–0.1)
BASOPHILS NFR BLD AUTO: 1 % (ref 0–1)
BNP SERPL-MCNC: 16 PG/ML (ref 0–100)
CARDIAC TROPONIN I PNL SERPL HS: 3 NG/L (ref 8–18)
CK SERPL-CCNC: 213 U/L (ref 39–308)
CRP SERPL QL: 7.8 MG/L
EOSINOPHIL # BLD AUTO: 0.28 THOUSAND/ÂΜL (ref 0–0.61)
EOSINOPHIL NFR BLD AUTO: 4 % (ref 0–6)
ERYTHROCYTE [DISTWIDTH] IN BLOOD BY AUTOMATED COUNT: 13.3 % (ref 11.6–15.1)
HCT VFR BLD AUTO: 41.6 % (ref 36.5–49.3)
HGB BLD-MCNC: 12.5 G/DL (ref 12–17)
IMM GRANULOCYTES # BLD AUTO: 0.02 THOUSAND/UL (ref 0–0.2)
IMM GRANULOCYTES NFR BLD AUTO: 0 % (ref 0–2)
LYMPHOCYTES # BLD AUTO: 2.78 THOUSANDS/ÂΜL (ref 0.6–4.47)
LYMPHOCYTES NFR BLD AUTO: 37 % (ref 14–44)
MCH RBC QN AUTO: 24.2 PG (ref 26.8–34.3)
MCHC RBC AUTO-ENTMCNC: 30 G/DL (ref 31.4–37.4)
MCV RBC AUTO: 81 FL (ref 82–98)
MONOCYTES # BLD AUTO: 0.74 THOUSAND/ÂΜL (ref 0.17–1.22)
MONOCYTES NFR BLD AUTO: 10 % (ref 4–12)
NEUTROPHILS # BLD AUTO: 3.66 THOUSANDS/ÂΜL (ref 1.85–7.62)
NEUTS SEG NFR BLD AUTO: 48 % (ref 43–75)
NRBC BLD AUTO-RTO: 0 /100 WBCS
P AXIS: 34 DEGREES
P AXIS: 41 DEGREES
PLATELET # BLD AUTO: 248 THOUSANDS/UL (ref 149–390)
PMV BLD AUTO: 10.1 FL (ref 8.9–12.7)
PR INTERVAL: 138 MS
PR INTERVAL: 142 MS
QRS AXIS: 50 DEGREES
QRS AXIS: 52 DEGREES
QRSD INTERVAL: 86 MS
QRSD INTERVAL: 86 MS
QT INTERVAL: 342 MS
QT INTERVAL: 348 MS
QTC INTERVAL: 420 MS
QTC INTERVAL: 432 MS
RBC # BLD AUTO: 5.17 MILLION/UL (ref 3.88–5.62)
T WAVE AXIS: -5 DEGREES
T WAVE AXIS: -9 DEGREES
VENTRICULAR RATE: 91 BPM
VENTRICULAR RATE: 93 BPM
WBC # BLD AUTO: 7.53 THOUSAND/UL (ref 4.31–10.16)

## 2024-02-05 PROCEDURE — 84484 ASSAY OF TROPONIN QUANT: CPT | Performed by: NURSE PRACTITIONER

## 2024-02-05 PROCEDURE — 93010 ELECTROCARDIOGRAM REPORT: CPT | Performed by: STUDENT IN AN ORGANIZED HEALTH CARE EDUCATION/TRAINING PROGRAM

## 2024-02-05 PROCEDURE — 99232 SBSQ HOSP IP/OBS MODERATE 35: CPT | Performed by: PSYCHIATRY & NEUROLOGY

## 2024-02-05 PROCEDURE — 86140 C-REACTIVE PROTEIN: CPT | Performed by: NURSE PRACTITIONER

## 2024-02-05 PROCEDURE — 85025 COMPLETE CBC W/AUTO DIFF WBC: CPT | Performed by: NURSE PRACTITIONER

## 2024-02-05 PROCEDURE — 82550 ASSAY OF CK (CPK): CPT | Performed by: NURSE PRACTITIONER

## 2024-02-05 PROCEDURE — 93005 ELECTROCARDIOGRAM TRACING: CPT

## 2024-02-05 PROCEDURE — 83880 ASSAY OF NATRIURETIC PEPTIDE: CPT | Performed by: NURSE PRACTITIONER

## 2024-02-05 RX ORDER — PROPRANOLOL HYDROCHLORIDE 10 MG/1
10 TABLET ORAL EVERY 12 HOURS SCHEDULED
Status: DISCONTINUED | OUTPATIENT
Start: 2024-02-05 | End: 2024-03-29 | Stop reason: HOSPADM

## 2024-02-05 RX ADMIN — MELATONIN TAB 3 MG 3 MG: 3 TAB at 21:27

## 2024-02-05 RX ADMIN — CLOZAPINE 275 MG: 25 TABLET ORAL at 21:27

## 2024-02-05 RX ADMIN — MIRTAZAPINE 45 MG: 30 TABLET, FILM COATED ORAL at 21:27

## 2024-02-05 RX ADMIN — MINOCYCLINE HYDROCHLORIDE 100 MG: 100 CAPSULE ORAL at 08:07

## 2024-02-05 RX ADMIN — ESCITALOPRAM OXALATE 20 MG: 10 TABLET ORAL at 08:06

## 2024-02-05 RX ADMIN — SENNOSIDES AND DOCUSATE SODIUM 1 TABLET: 8.6; 5 TABLET ORAL at 17:42

## 2024-02-05 RX ADMIN — PROPRANOLOL HYDROCHLORIDE 10 MG: 10 TABLET ORAL at 10:17

## 2024-02-05 RX ADMIN — NICOTINE POLACRILEX 4 MG: 4 GUM, CHEWING ORAL at 08:03

## 2024-02-05 RX ADMIN — NICOTINE POLACRILEX 4 MG: 4 GUM, CHEWING ORAL at 13:02

## 2024-02-05 RX ADMIN — POLYETHYLENE GLYCOL 3350 17 G: 17 POWDER, FOR SOLUTION ORAL at 08:07

## 2024-02-05 RX ADMIN — NICOTINE POLACRILEX 4 MG: 4 GUM, CHEWING ORAL at 18:00

## 2024-02-05 RX ADMIN — MINOCYCLINE HYDROCHLORIDE 100 MG: 100 CAPSULE ORAL at 21:59

## 2024-02-05 RX ADMIN — PROPRANOLOL HYDROCHLORIDE 10 MG: 10 TABLET ORAL at 21:27

## 2024-02-05 RX ADMIN — SENNOSIDES AND DOCUSATE SODIUM 1 TABLET: 8.6; 5 TABLET ORAL at 08:06

## 2024-02-05 RX ADMIN — ATROPINE SULFATE 1 DROP: 10 SOLUTION/ DROPS OPHTHALMIC at 21:59

## 2024-02-05 RX ADMIN — METFORMIN HYDROCHLORIDE 500 MG: 500 TABLET ORAL at 08:03

## 2024-02-05 NOTE — NURSING NOTE
Patient more isolative this shift. Patient endorses AH, but did not require any PRN medications. Patient denied SI at this time. Patient is compliant with scheduled medications. Staff maintained continuous rounding for safety and support.

## 2024-02-05 NOTE — PROGRESS NOTES
02/05/24 1551   Team Meeting   Meeting Type Daily Rounds   Team Members Present   Team Members Present Physician;Nurse;   Physician Team Member Mark   Nursing Team Member Tavon   Care Management Team Member Miguelito   Patient/Family Present   Patient Present No     Patient currently holds 201 status. Patient continues to report AH. Patient is medication and meal compliant. Patient to continue on Clozaril, staff to monitor. Patient discharge date and time to be determined.

## 2024-02-05 NOTE — PROGRESS NOTES
Progress Note - Behavioral Health     Alberto Berumen 28 y.o. male MRN: [id number]   Unit/Bed#: [facility] 383-02 Encounter: [id number]    Documentation, nursing notes, medication reconciliation, labs, and vitals reviewed. Patient was seen for continuing care and reviewed with treatment team. No acute events over the past 24 hours.  Per nursing report, refused breakfast this morning, but he is actively trying to lose weight.  He has gained 25 pounds since admission in November.  Reports auditory hallucinations that  tell him he is going to hell and that his family is going to die.    No medication changes over the past 24 hours. Continues to tolerate current medications with no adverse effects.  We have been monitoring his heart rate during Clozaril titration.  EKG today with heart rate of 93 and QTc within normal limits .  Will continue with Clozaril titration     on evaluation today, he is  seen out of his room and participating in the milieu.  He is pleasant and cooperative.  However, states no improvement in hallucinations.  Reviewed with him the plan to increase his Clozaril as tolerated.  Also the addition of propranolol for anxiety and tachycardia.    No suicidal ideation, plan, or intent. Ongoing perceptual disturbances and does not exhibit any symptoms of aimee on evaluation.    Psychiatric ROS:  Behavior over the last 24 hours: unchanged  Sleep: slept off and on  Appetite: fair  Medication side effects: No   ROS: no complaints, all other systems are negative    Mental Status Evaluation:    Appearance:  age appropriate   Behavior:  cooperative   Speech:  increased latency of response   Mood:  depressed   Affect:  constricted   Thought Process:  goal directed   Associations: intact associations   Thought Content:  Zoroastrianism preoccupation, some paranoia   Perceptual Disturbances: auditory hallucinations   Risk Potential: Suicidal ideation - None  Homicidal ideation - None  Potential for aggression - No   Sensorium:   oriented to person, place, time/date, and situation   Memory:  recent and remote memory grossly intact   Consciousness:  alert and awake   Attention/Concentration: decreased concentration and decreased attention span   Insight:  limited   Judgment: limited   Gait/Station: normal gait/station, normal balance   Motor Activity: no abnormal movements     Vital signs in last 24 hours:    Temp:  [97.2 °F (36.2 °C)-97.5 °F (36.4 °C)] 97.2 °F (36.2 °C)  HR:  [100-115] 109  Resp:  [17-18] 17  BP: (124-142)/(87-96) 124/87    Laboratory results: I have personally reviewed all pertinent laboratory/tests results    Results from the past 24 hours:   Recent Results (from the past 24 hour(s))   CBC and differential    Collection Time: 02/05/24  6:23 AM   Result Value Ref Range    WBC 7.53 4.31 - 10.16 Thousand/uL    RBC 5.17 3.88 - 5.62 Million/uL    Hemoglobin 12.5 12.0 - 17.0 g/dL    Hematocrit 41.6 36.5 - 49.3 %    MCV 81 (L) 82 - 98 fL    MCH 24.2 (L) 26.8 - 34.3 pg    MCHC 30.0 (L) 31.4 - 37.4 g/dL    RDW 13.3 11.6 - 15.1 %    MPV 10.1 8.9 - 12.7 fL    Platelets 248 149 - 390 Thousands/uL    nRBC 0 /100 WBCs    Neutrophils Relative 48 43 - 75 %    Immat GRANS % 0 0 - 2 %    Lymphocytes Relative 37 14 - 44 %    Monocytes Relative 10 4 - 12 %    Eosinophils Relative 4 0 - 6 %    Basophils Relative 1 0 - 1 %    Neutrophils Absolute 3.66 1.85 - 7.62 Thousands/µL    Immature Grans Absolute 0.02 0.00 - 0.20 Thousand/uL    Lymphocytes Absolute 2.78 0.60 - 4.47 Thousands/µL    Monocytes Absolute 0.74 0.17 - 1.22 Thousand/µL    Eosinophils Absolute 0.28 0.00 - 0.61 Thousand/µL    Basophils Absolute 0.05 0.00 - 0.10 Thousands/µL   CK    Collection Time: 02/05/24  6:23 AM   Result Value Ref Range    Total  39 - 308 U/L   C-reactive protein    Collection Time: 02/05/24  6:23 AM   Result Value Ref Range    CRP 7.8 (H) <3.0 mg/L   B-Type Natriuretic Peptide(BNP)    Collection Time: 02/05/24  6:23 AM   Result Value Ref Range    BNP 16  0 - 100 pg/mL   High Sensitivity Troponin I Random    Collection Time: 02/05/24  6:23 AM   Result Value Ref Range    HS TnI random 3 (L) 8 - 18 ng/L       Suicide/Homicide Risk Assessment:    Risk of Harm to Self:   Current Specific Risk Factors include: current psychotic symptoms  Protective Factors: no current suicidal ideation  Based on today's assessment, Alberto presents the following risk of harm to self: minimal    Risk of Harm to Others:  Current Specific Risk Factors include: unstable mood disorder  Protective Factors: no current homicidal ideation, mood is stabilizing, compliant with medications on the unit as ordered  Based on today's assessment, Alberto presents the following risk of harm to others: minimal    The following interventions are recommended: behavioral checks every 7 minutes, continued hospitalization on locked unit    Progress Toward Goals: limited improvement, poor motivation, continues to endorse psychotic symptoms    Assessment/Plan   Principal Problem:    Schizoaffective disorder, bipolar type ([facility])  Active Problems:    GERD (gastroesophageal reflux disease)    Tobacco abuse    T wave inversion in EKG    Chronic idiopathic constipation    Vitamin B 12 deficiency    Vitamin D deficiency    Acanthosis nigricans    Rash      Recommended Treatment:     Planned medication and treatment changes:    All current active medications have been reviewed  Encourage group therapy, milieu therapy and occupational therapy  Behavioral Health checks every 7 minutes  Increase Clozaril 275 mg at bedtime for ongoing psychosis  Add propranolol 10 mg p.o. twice daily for anxiety and tachycardia    Monitor weekly CBC, BNP, CRP, CK, troponin, EKG every Monday for Clozaril monitoring    Current Facility-Administered Medications   Medication Dose Route Frequency Provider Last Rate    acetaminophen  650 mg Oral Q6H PRN Yasmin Harvey MD      acetaminophen  650 mg Oral Q4H PRN Yasmin Harvey MD       acetaminophen  975 mg Oral Q6H PRN Yasmin Harvey MD      aluminum-magnesium hydroxide-simethicone  30 mL Oral Q4H PRN Yasmin Harvey MD      atropine  1 drop Sublingual BID Damari Larose MD      haloperidol lactate  2.5 mg Intramuscular Q4H PRN Max 4/day Yasmin Harvey MD      And    LORazepam  1 mg Intramuscular Q4H PRN Max 4/day Yasmin Harvey MD      And    benztropine  0.5 mg Intramuscular Q4H PRN Max 4/day Yasmin Harvey MD      haloperidol lactate  5 mg Intramuscular Q4H PRN Max 4/day Yasmin Harvey MD      And    LORazepam  2 mg Intramuscular Q4H PRN Max 4/day Yasmin Harvey MD      And    benztropine  1 mg Intramuscular Q4H PRN Max 4/day Yasmin Harvey MD      benztropine  1 mg Oral Q4H PRN Max 6/day Yasmin Harvey MD      bisacodyl  10 mg Rectal Daily PRN Yasmin Harvey MD      cloZAPine  275 mg Oral HS HOLLI Anderson      hydrOXYzine HCL  50 mg Oral Q6H PRN Max 4/day Yasmin Harvey MD      Or    diphenhydrAMINE  50 mg Intramuscular Q6H PRN Yasmin Harvey MD      diphenhydrAMINE-zinc acetate   Topical BID PRN Anjel Sagastume MD      escitalopram  20 mg Oral Daily La Flores MD      haloperidol  1 mg Oral Q6H PRN Yasmin Harvey MD      haloperidol  2.5 mg Oral Q4H PRN Max 4/day Yasmin Harvey MD      haloperidol  5 mg Oral Q4H PRN Max 4/day Yasmin Harvey MD      hydrocortisone   Topical 4x Daily PRN HOLLI Monge      hydrOXYzine HCL  100 mg Oral Q6H PRN Max 4/day Yasmin Harvey MD      Or    LORazepam  2 mg Intramuscular Q6H PRN Yasmin Harvey MD      hydrOXYzine HCL  25 mg Oral Q6H PRN Max 4/day Yasmin Harvey MD      melatonin  3 mg Oral HS Yasmin Harvey MD      metFORMIN  500 mg Oral Daily With Breakfast HOLLI Monge      minocycline  100 mg Oral Q12H KAYLIE HOLLI Monge      mirtazapine  45 mg Oral HS La Flores MD      nicotine polacrilex  4 mg Oral Q2H PRN Yasmin Harvey,  MD      polyethylene glycol  17 g Oral Daily PRN Yasmin Harvey MD      polyethylene glycol  17 g Oral Daily HOLLI Monge      propranolol  10 mg Oral Q12H [facility] HOLLI Anderson      senna-docusate sodium  1 tablet Oral Daily PRN MD addison Hameedna-docusate sodium  1 tablet Oral BID La Flores MD      traZODone  50 mg Oral HS PRN Yasmin Harvey MD      white petrolatum-mineral oil   Topical TID PRN Anjel Sagastume MD       Risks / Benefits of Treatment:    Risks, benefits, and possible side effects of medications explained to patient and patient verbalizes understanding and agreement for treatment.    Counseling / Coordination of Care:    Patient's progress discussed with staff in treatment team meeting.  Medications, treatment progress and treatment plan reviewed with patient.    HOLLI Anderson 02/05/24

## 2024-02-05 NOTE — NURSING NOTE
"Patient was out for vitals but refused their breakfast this morning, stated that they were not hungry. Reports auditory hallucinations that they are going to hell and they and their family are going to die. Reassurance provided and patient encouraged to come to staff if voices worsen. Denied depression, anxiety, SI, and HI.     Refused atropine drops this morning and patient stated \"I only take them at night\". Discussed with patient the importance of taking their medication. Patient medication compliant with rest of scheduled medications. Denies any unmet needs or concerns at this time.   "

## 2024-02-06 PROCEDURE — 99232 SBSQ HOSP IP/OBS MODERATE 35: CPT | Performed by: PSYCHIATRY & NEUROLOGY

## 2024-02-06 RX ADMIN — POLYETHYLENE GLYCOL 3350 17 G: 17 POWDER, FOR SOLUTION ORAL at 08:58

## 2024-02-06 RX ADMIN — MINOCYCLINE HYDROCHLORIDE 100 MG: 100 CAPSULE ORAL at 08:56

## 2024-02-06 RX ADMIN — CLOZAPINE 300 MG: 100 TABLET ORAL at 21:37

## 2024-02-06 RX ADMIN — MINOCYCLINE HYDROCHLORIDE 100 MG: 100 CAPSULE ORAL at 21:38

## 2024-02-06 RX ADMIN — PROPRANOLOL HYDROCHLORIDE 10 MG: 10 TABLET ORAL at 08:57

## 2024-02-06 RX ADMIN — NICOTINE POLACRILEX 4 MG: 4 GUM, CHEWING ORAL at 15:43

## 2024-02-06 RX ADMIN — SENNOSIDES AND DOCUSATE SODIUM 1 TABLET: 8.6; 5 TABLET ORAL at 08:57

## 2024-02-06 RX ADMIN — METFORMIN HYDROCHLORIDE 500 MG: 500 TABLET ORAL at 08:57

## 2024-02-06 RX ADMIN — NICOTINE POLACRILEX 4 MG: 4 GUM, CHEWING ORAL at 17:36

## 2024-02-06 RX ADMIN — ATROPINE SULFATE 1 DROP: 10 SOLUTION/ DROPS OPHTHALMIC at 21:37

## 2024-02-06 RX ADMIN — MELATONIN TAB 3 MG 3 MG: 3 TAB at 21:38

## 2024-02-06 RX ADMIN — MIRTAZAPINE 45 MG: 30 TABLET, FILM COATED ORAL at 21:38

## 2024-02-06 RX ADMIN — NICOTINE POLACRILEX 4 MG: 4 GUM, CHEWING ORAL at 12:45

## 2024-02-06 RX ADMIN — PROPRANOLOL HYDROCHLORIDE 10 MG: 10 TABLET ORAL at 20:17

## 2024-02-06 RX ADMIN — SENNOSIDES AND DOCUSATE SODIUM 1 TABLET: 8.6; 5 TABLET ORAL at 17:35

## 2024-02-06 RX ADMIN — ESCITALOPRAM OXALATE 20 MG: 10 TABLET ORAL at 08:57

## 2024-02-06 NOTE — NURSING NOTE
Patient was isolative to self and room. Denies all psych symptoms. Refused his Atropine drops. Offers no complaints at this time.

## 2024-02-06 NOTE — NURSING NOTE
Patient pleasant on approach, reports auditory hallucinations, but able to distinguish voices from reality. Patient visible on unit socializing with peers, compliant with medication and unit routine.

## 2024-02-06 NOTE — PROGRESS NOTES
"Progress Note - Behavioral Health     Alberto Berumen 28 y.o. male MRN: [id number]   Unit/Bed#: [facility] 383-02 Encounter: [id number]    Documentation, nursing notes, medication reconciliation, labs, and vitals reviewed. Patient was seen for continuing care and reviewed with treatment team. No acute events over the past 24 hours.  Per nursing report, he is intermittently brighter and participating more.  Reports ongoing auditory hallucinations.  Compliant with medication and unit routine.    Medication changes over the past 24 hours: We continue to titrate up on his Clozaril as tolerated. Continues to tolerate current medications with no adverse effects.  Heart rate this morning is 96 and will continue to titrate up on his Clozaril.     On evaluation today, he is seen resting in bed this morning.  States \"about the same\".  Calm and cooperative but ongoing auditory hallucinations and paranoia with Jewish preoccupation stating he is afraid he is going to hell.  No suicidal ideation, plan, or intent.  Ongoing perceptual disturbances and does not exhibit any symptoms of aimee on evaluation.    Psychiatric ROS:  Behavior over the last 24 hours: minimal improvement  Sleep: slept off and on  Appetite: fair  Medication side effects: No   ROS: no complaints, all other systems are negative    Mental Status Evaluation:    Appearance:  marginal hygiene   Behavior:  cooperative   Speech:  increased latency of response   Mood:  depressed   Affect:  flat   Thought Process:  decreased rate of thoughts   Associations: concrete associations   Thought Content:  Jewish preoccupation   Perceptual Disturbances: auditory hallucinations   Risk Potential: Suicidal ideation - None  Homicidal ideation - None  Potential for aggression - No   Sensorium:  oriented to person, place, and time/date   Memory:  recent and remote memory grossly intact   Consciousness:  alert and awake   Attention/Concentration: decreased concentration and decreased " attention span   Insight:  limited   Judgment: limited   Gait/Station: normal gait/station, normal balance   Motor Activity: no abnormal movements     Vital signs in last 24 hours:    Temp:  [97.1 °F (36.2 °C)] 97.1 °F (36.2 °C)  HR:  [] 109  Resp:  [16-18] 16  BP: (125-131)/(77-81) 131/77    Laboratory results: I have personally reviewed all pertinent laboratory/tests results    Results from the past 24 hours: No results found for this or any previous visit (from the past 24 hour(s)).    Suicide/Homicide Risk Assessment:    Risk of Harm to Self:   Current Specific Risk Factors include: mental illness diagnosis  Protective Factors: no current suicidal ideation, ability to communicate with staff on the unit, able to contract for safety on the unit, taking medications as ordered on the unit  Based on today's assessment, Alberto presents the following risk of harm to self: minimal    Risk of Harm to Others:  Current Specific Risk Factors include: current psychotic symptoms  Protective Factors: no current homicidal ideation, able to communicate with staff on the unit, compliant with medications on the unit as ordered  Based on today's assessment, Alberto presents the following risk of harm to others: minimal    The following interventions are recommended: behavioral checks every 7 minutes, continued hospitalization on locked unit    Progress Toward Goals: minimal improvement    Assessment/Plan   Principal Problem:    Schizoaffective disorder, bipolar type (HCC)  Active Problems:    GERD (gastroesophageal reflux disease)    Tobacco abuse    T wave inversion in EKG    Chronic idiopathic constipation    Vitamin B 12 deficiency    Vitamin D deficiency    Acanthosis nigricans    Rash      Recommended Treatment:     Planned medication and treatment changes:    All current active medications have been reviewed  Encourage group therapy, milieu therapy and occupational therapy  Behavioral Health checks every 7  minutes  Increase Clozaril to 300 mg at bedtime  Check clozapine level in 4 days  Monitor weekly CBC, BNP, CK, CRP, troponin, and EKG every Monday for Clozaril monitoring    Current Facility-Administered Medications   Medication Dose Route Frequency Provider Last Rate    acetaminophen  650 mg Oral Q6H PRN Yasmin Harvey MD      acetaminophen  650 mg Oral Q4H PRN Yasmin Harvey MD      acetaminophen  975 mg Oral Q6H PRN Yasmin Harvey MD      aluminum-magnesium hydroxide-simethicone  30 mL Oral Q4H PRN Yasmin Harvey MD      atropine  1 drop Sublingual BID Damari Larsoe MD      haloperidol lactate  2.5 mg Intramuscular Q4H PRN Max 4/day Yasmin Harvey MD      And    LORazepam  1 mg Intramuscular Q4H PRN Max 4/day Yasmin Harvey MD      And    benztropine  0.5 mg Intramuscular Q4H PRN Max 4/day Yasmin Harvey MD      haloperidol lactate  5 mg Intramuscular Q4H PRN Max 4/day Yasmin Harvey MD      And    LORazepam  2 mg Intramuscular Q4H PRN Max 4/day Yasmin Harvey MD      And    benztropine  1 mg Intramuscular Q4H PRN Max 4/day Yasmin Harvey MD      benztropine  1 mg Oral Q4H PRN Max 6/day Yasmin Harvey MD      bisacodyl  10 mg Rectal Daily PRN Yasmin Harvey MD      cloZAPine  275 mg Oral HS HOLLI Anderson      hydrOXYzine HCL  50 mg Oral Q6H PRN Max 4/day Yasmin Harvey MD      Or    diphenhydrAMINE  50 mg Intramuscular Q6H PRN Yasmin Harvey MD      diphenhydrAMINE-zinc acetate   Topical BID PRN Anjel Sagastume MD      escitalopram  20 mg Oral Daily La Flores MD      haloperidol  1 mg Oral Q6H PRN Yasmin Harvey MD      haloperidol  2.5 mg Oral Q4H PRN Max 4/day Yasmin Harvey MD      haloperidol  5 mg Oral Q4H PRN Max 4/day Yasmin Harvey MD      hydrocortisone   Topical 4x Daily PRN HOLLI Monge      hydrOXYzine HCL  100 mg Oral Q6H PRN Max 4/day Yasmin Harvey MD      Or    LORazepam  2 mg Intramuscular Q6H PRN Yasmin JACKMAN  MD Candice      hydrOXYzine HCL  25 mg Oral Q6H PRN Max 4/day Yasmin Harvey MD      melatonin  3 mg Oral HS Yasmin Harvey MD      metFORMIN  500 mg Oral Daily With Breakfast Eileen HOLLI Higuera      minocycline  100 mg Oral Q12H [facility] HOLLI Monge      mirtazapine  45 mg Oral HS La Flores MD      nicotine polacrilex  4 mg Oral Q2H PRN Yasmin Harvey MD      polyethylene glycol  17 g Oral Daily PRN Yasmin Harvey MD      polyethylene glycol  17 g Oral Daily Eileen CherryHOLLI Jimenez      propranolol  10 mg Oral Q12H [facility] HOLLI Anderson      senna-docusate sodium  1 tablet Oral Daily PRN Yasmin Harvey MD      senna-docusate sodium  1 tablet Oral BID La Flores MD      traZODone  50 mg Oral HS PRN Yasmin Harvey MD      white petrolatum-mineral oil   Topical TID PRN Anjel Sagastume MD       Risks / Benefits of Treatment:    Risks, benefits, and possible side effects of medications explained to patient and patient verbalizes understanding and agreement for treatment.    Counseling / Coordination of Care:    Patient's progress discussed with staff in treatment team meeting.  Medications, treatment progress and treatment plan reviewed with patient.    HOLLI Anderson 02/06/24

## 2024-02-06 NOTE — CASE MANAGEMENT
Writer called  marisa for pending signature on a form for MA application. Marisa shared she would fax over the document.

## 2024-02-06 NOTE — PROGRESS NOTES
02/06/24 1500   Team Meeting   Meeting Type Daily Rounds   Team Members Present   Team Members Present Physician;Nurse;   Physician Team Member Mark   Nursing Team Member Giancarlo   Care Management Team Member Miguelito   Patient/Family Present   Patient Present No   Patient's Family Present No     Patient currently holds 201 status. Patient continues to report AH but denies all other symptoms. Patient is medication and meal compliant. Patient to continue on Clozaril and Lexapro. Providers to increase doses of Clozaril and Remeron. Patient discharge date and time is to be determined.

## 2024-02-07 PROCEDURE — 99232 SBSQ HOSP IP/OBS MODERATE 35: CPT | Performed by: PSYCHIATRY & NEUROLOGY

## 2024-02-07 RX ORDER — ATROPINE SULFATE 10 MG/ML
1 SOLUTION/ DROPS OPHTHALMIC DAILY PRN
Status: DISCONTINUED | OUTPATIENT
Start: 2024-02-07 | End: 2024-03-29 | Stop reason: HOSPADM

## 2024-02-07 RX ORDER — ATROPINE SULFATE 10 MG/ML
1 SOLUTION/ DROPS OPHTHALMIC
Status: DISCONTINUED | OUTPATIENT
Start: 2024-02-07 | End: 2024-03-29 | Stop reason: HOSPADM

## 2024-02-07 RX ADMIN — PROPRANOLOL HYDROCHLORIDE 10 MG: 10 TABLET ORAL at 21:42

## 2024-02-07 RX ADMIN — MIRTAZAPINE 45 MG: 30 TABLET, FILM COATED ORAL at 21:42

## 2024-02-07 RX ADMIN — NICOTINE POLACRILEX 4 MG: 4 GUM, CHEWING ORAL at 13:26

## 2024-02-07 RX ADMIN — NICOTINE POLACRILEX 4 MG: 4 GUM, CHEWING ORAL at 09:27

## 2024-02-07 RX ADMIN — MINOCYCLINE HYDROCHLORIDE 100 MG: 100 CAPSULE ORAL at 21:42

## 2024-02-07 RX ADMIN — ESCITALOPRAM OXALATE 20 MG: 10 TABLET ORAL at 09:23

## 2024-02-07 RX ADMIN — SENNOSIDES AND DOCUSATE SODIUM 1 TABLET: 8.6; 5 TABLET ORAL at 09:23

## 2024-02-07 RX ADMIN — NICOTINE POLACRILEX 4 MG: 4 GUM, CHEWING ORAL at 15:47

## 2024-02-07 RX ADMIN — CLOZAPINE 300 MG: 100 TABLET ORAL at 21:42

## 2024-02-07 RX ADMIN — MELATONIN TAB 3 MG 3 MG: 3 TAB at 21:42

## 2024-02-07 RX ADMIN — NICOTINE POLACRILEX 4 MG: 4 GUM, CHEWING ORAL at 18:00

## 2024-02-07 RX ADMIN — POLYETHYLENE GLYCOL 3350 17 G: 17 POWDER, FOR SOLUTION ORAL at 09:54

## 2024-02-07 RX ADMIN — SENNOSIDES AND DOCUSATE SODIUM 1 TABLET: 8.6; 5 TABLET ORAL at 17:14

## 2024-02-07 RX ADMIN — MINOCYCLINE HYDROCHLORIDE 100 MG: 100 CAPSULE ORAL at 09:53

## 2024-02-07 RX ADMIN — ATROPINE SULFATE 1 DROP: 10 SOLUTION/ DROPS OPHTHALMIC at 21:46

## 2024-02-07 RX ADMIN — METFORMIN HYDROCHLORIDE 500 MG: 500 TABLET ORAL at 09:26

## 2024-02-07 RX ADMIN — PROPRANOLOL HYDROCHLORIDE 10 MG: 10 TABLET ORAL at 09:23

## 2024-02-07 NOTE — PLAN OF CARE
Problem: Ineffective Coping  Goal: Participates in unit activities  Description: Interventions:  - Provide therapeutic environment   - Provide required programming   - Redirect inappropriate behaviors   Outcome: Not Progressing    It appears patient has only attended one group per day for the past several weeks.

## 2024-02-07 NOTE — NURSING NOTE
Pt observed in milieu and dayroom. Pt is social and caring for peers, pleasant and cooperative with staff. Pt is wearing his own clothing. Medication and meal compliant. Pt denies SI/HI/AH/VH, or pain. Pt attends group. No unmet needs

## 2024-02-07 NOTE — NURSING NOTE
Patient pleasant on approach reports auditory hallucinations, reports able to distinguish reality verses hallucinations. Visible out for meals, remained isolative to room majority of evening.

## 2024-02-07 NOTE — PROGRESS NOTES
02/07/24 1544   Team Meeting   Meeting Type Daily Rounds   Team Members Present   Team Members Present Physician;Nurse;   Physician Team Member Mark   Nursing Team Member Giancarlo   Care Management Team Member Miguelito   Patient/Family Present   Patient Present No   Patient's Family Present No     Patient currently holds 201 status. Patient continues to report AH but denies all other symptoms. Patient is medication and meal compliant. Patient is to continue on all current scheduled medications. Staff to monitor progress. Patient discharge date and time is to be determined.

## 2024-02-07 NOTE — PROGRESS NOTES
02/07/24 1400   Activity/Group Checklist   Group Other (Comment)  (Group Art Therapy/Psychodynamic, Creatively Exploring Boundaries within Chaos and Process Discussion)   Attendance Attended   Attendance Duration (min) Greater than 60  (90 min group)   Interactions Interacted appropriately  (Required prompting)   Affect/Mood Blunted/flat   Goals Achieved Able to listen to others;Able to engage in interactions

## 2024-02-07 NOTE — PROGRESS NOTES
Progress Note - Behavioral Health     Alberto Berumen 28 y.o. male MRN: [id number]   Unit/Bed#: [facility] 383-02 Encounter: [id number]    Documentation, nursing notes, medication reconciliation, labs, and vitals reviewed. Patient was seen for continuing care and reviewed with treatment team. No acute events over the past 24 hours.  Per nursing report, has been out of his room and more social with peers.  Pleasant cooperative staff.  Caring with other peers.    Medication changes over the past 24 hours. Continues to tolerate current medications with no adverse effects.  Does have drooling overnight.  Does use the atropine drops at bedtime only.  Does not like to use during the day, as it makes his mouth dry.  We will change that order to at bedtime scheduled and once daily as needed for drooling.   On evaluation today, continues to note some gradual improvement in mood-but continues to have threatening auditory hallucinations that cause him distress.  He is aware we are awaiting Clozaril level before adjusting his dose again.    No suicidal ideation, plan, or intent.  Ongoing and unchanged perceptual disturbances and does not exhibit any symptoms of aimee on evaluation.    Psychiatric ROS:  Behavior over the last 24 hours: unchanged  Sleep: slept off and on  Appetite: fair  Medication side effects: drooling worse at night, uses atropine drops at HS only  ROS: no complaints, all other systems are negative    Mental Status Evaluation:    Appearance:  marginal hygiene   Behavior:  cooperative, guarded   Speech:  slow   Mood:  depressed   Affect:  flat   Thought Process:  goal directed   Associations: concrete associations   Thought Content:  Taoism preoccupation, some paranoia   Perceptual Disturbances: auditory hallucinations   Risk Potential: Suicidal ideation - None  Homicidal ideation - None  Potential for aggression - No   Sensorium:  oriented to person, place, and time/date   Memory:  recent and remote memory grossly  intact   Consciousness:  alert and awake   Attention/Concentration: decreased concentration and decreased attention span   Insight:  limited   Judgment: limited   Gait/Station: normal gait/station, normal balance   Motor Activity: no abnormal movements     Vital signs in last 24 hours:    Temp:  [98 °F (36.7 °C)-98.7 °F (37.1 °C)] 98 °F (36.7 °C)  HR:  [] 91  Resp:  [18] 18  BP: (125-141)/(73-89) 125/73    Laboratory results: I have personally reviewed all pertinent laboratory/tests results    Results from the past 24 hours: No results found for this or any previous visit (from the past 24 hour(s)).    Suicide/Homicide Risk Assessment:    Risk of Harm to Self:   Current Specific Risk Factors include: current depressive symptoms, current psychotic symptoms  Protective Factors: no current suicidal ideation, ability to communicate with staff on the unit, able to contract for safety on the unit, taking medications as ordered on the unit  Based on today's assessment, lAberto presents the following risk of harm to self: minimal    Risk of Harm to Others:  Current Specific Risk Factors include: current psychotic symptoms  Protective Factors: no current homicidal ideation, able to communicate with staff on the unit, psychotic symptoms are less prominent, compliant with medications on the unit as ordered  Based on today's assessment, Alberto presents the following risk of harm to others: minimal    The following interventions are recommended: behavioral checks every 7 minutes, continued hospitalization on locked unit    Progress Toward Goals: no significant improvement, still depressed, still psychotic, still preoccupied    Assessment/Plan   Principal Problem:    Schizoaffective disorder, bipolar type ([facility])  Active Problems:    GERD (gastroesophageal reflux disease)    Tobacco abuse    T wave inversion in EKG    Chronic idiopathic constipation    Vitamin B 12 deficiency    Vitamin D deficiency    Acanthosis nigricans     Rash      Recommended Treatment:     Planned medication and treatment changes:    All current active medications have been reviewed  Encourage group therapy, milieu therapy and occupational therapy  Behavioral Health checks every 7 minutes  Change atropine drops to sublingual at bedtime only and once daily as needed for drooling, patient has been refusing the morning dose due to dry mouth during the day  Continue Clozaril at 300 mg at bedtime until we check his level  Check clozapine level in 3 days  Monitor weekly EKG, CBC, CK, CRP, BNP, troponin every Monday for Clozaril monitoring  Continue all other medications:    Current Facility-Administered Medications   Medication Dose Route Frequency Provider Last Rate    acetaminophen  650 mg Oral Q6H PRN Yasmin Harvey MD      acetaminophen  650 mg Oral Q4H PRN Yasmin Harvey MD      acetaminophen  975 mg Oral Q6H PRN Yasmin Harvey MD      aluminum-magnesium hydroxide-simethicone  30 mL Oral Q4H PRN Yasmin Harvey MD      atropine  1 drop Sublingual HS HOLLI Anderson      atropine  1 drop Sublingual Daily PRN HOLLI Anderson      haloperidol lactate  2.5 mg Intramuscular Q4H PRN Max 4/day Yasmin Harvey MD      And    LORazepam  1 mg Intramuscular Q4H PRN Max 4/day Yasmin Harvey MD      And    benztropine  0.5 mg Intramuscular Q4H PRN Max 4/day Yasmin Harvey MD      haloperidol lactate  5 mg Intramuscular Q4H PRN Max 4/day Yasmin Harvey MD      And    LORazepam  2 mg Intramuscular Q4H PRN Max 4/day Yasmin Harvey MD      And    benztropine  1 mg Intramuscular Q4H PRN Max 4/day Yasmin Harvey MD      benztropine  1 mg Oral Q4H PRN Max 6/day Yasmin Harvey MD      bisacodyl  10 mg Rectal Daily PRN Yasmin Harvey MD      cloZAPine  300 mg Oral HS HOLLI Anderson      hydrOXYzine HCL  50 mg Oral Q6H PRN Max 4/day Yasmin Harvey MD      Or    diphenhydrAMINE  50 mg Intramuscular Q6H PRN Yasmin Harvey MD       diphenhydrAMINE-zinc acetate   Topical BID PRN Anjel Sagastume MD      escitalopram  20 mg Oral Daily La Flores MD      haloperidol  1 mg Oral Q6H PRN Yasmin Harvey MD      haloperidol  2.5 mg Oral Q4H PRN Max 4/day Yasmin Harvey MD      haloperidol  5 mg Oral Q4H PRN Max 4/day Yasmin Harvey MD      hydrocortisone   Topical 4x Daily PRN HOLLI Monge      hydrOXYzine HCL  100 mg Oral Q6H PRN Max 4/day Yasmin Harvey MD      Or    LORazepam  2 mg Intramuscular Q6H PRN Yasmin Harvey MD      hydrOXYzine HCL  25 mg Oral Q6H PRN Max 4/day Yasmin Harvey MD      melatonin  3 mg Oral HS Yasmin Harvey MD      metFORMIN  500 mg Oral Daily With Breakfast HOLLI Monge      minocycline  100 mg Oral Q12H KAYLIE HOLLI Monge      mirtazapine  45 mg Oral HS La Flores MD      nicotine polacrilex  4 mg Oral Q2H PRN Yasmin Harvey MD      polyethylene glycol  17 g Oral Daily PRN Yasmin Harvey MD      polyethylene glycol  17 g Oral Daily HOLLI Monge      propranolol  10 mg Oral Q12H KAYLIE HOLLI Anderson      senna-docusate sodium  1 tablet Oral Daily PRN Yasmin Harvey MD      senna-docusate sodium  1 tablet Oral BID La Flores MD      traZODone  50 mg Oral HS PRN Yasmin Harvey MD      white petrolatum-mineral oil   Topical TID PRN Anjel Sagastume MD       Risks / Benefits of Treatment:    Risks, benefits, and possible side effects of medications explained to patient. Patient has limited understanding of risks and benefits of treatment at this time, but agrees to take medications as prescribed.    Counseling / Coordination of Care:    Medications, treatment progress and treatment plan reviewed with patient.    HOLLI Anderson 02/07/24

## 2024-02-08 PROCEDURE — 99232 SBSQ HOSP IP/OBS MODERATE 35: CPT | Performed by: PSYCHIATRY & NEUROLOGY

## 2024-02-08 RX ADMIN — CLOZAPINE 300 MG: 100 TABLET ORAL at 21:15

## 2024-02-08 RX ADMIN — MIRTAZAPINE 45 MG: 30 TABLET, FILM COATED ORAL at 21:15

## 2024-02-08 RX ADMIN — SENNOSIDES AND DOCUSATE SODIUM 1 TABLET: 8.6; 5 TABLET ORAL at 17:34

## 2024-02-08 RX ADMIN — ATROPINE SULFATE 1 DROP: 10 SOLUTION/ DROPS OPHTHALMIC at 21:17

## 2024-02-08 RX ADMIN — MELATONIN TAB 3 MG 3 MG: 3 TAB at 21:15

## 2024-02-08 RX ADMIN — NICOTINE POLACRILEX 4 MG: 4 GUM, CHEWING ORAL at 17:52

## 2024-02-08 RX ADMIN — SENNOSIDES AND DOCUSATE SODIUM 1 TABLET: 8.6; 5 TABLET ORAL at 08:31

## 2024-02-08 RX ADMIN — ESCITALOPRAM OXALATE 20 MG: 10 TABLET ORAL at 08:30

## 2024-02-08 RX ADMIN — POLYETHYLENE GLYCOL 3350 17 G: 17 POWDER, FOR SOLUTION ORAL at 08:30

## 2024-02-08 RX ADMIN — METFORMIN HYDROCHLORIDE 500 MG: 500 TABLET ORAL at 08:31

## 2024-02-08 RX ADMIN — NICOTINE POLACRILEX 4 MG: 4 GUM, CHEWING ORAL at 15:48

## 2024-02-08 RX ADMIN — MINOCYCLINE HYDROCHLORIDE 100 MG: 100 CAPSULE ORAL at 08:31

## 2024-02-08 RX ADMIN — NICOTINE POLACRILEX 4 MG: 4 GUM, CHEWING ORAL at 12:59

## 2024-02-08 RX ADMIN — MINOCYCLINE HYDROCHLORIDE 100 MG: 100 CAPSULE ORAL at 21:15

## 2024-02-08 RX ADMIN — PROPRANOLOL HYDROCHLORIDE 10 MG: 10 TABLET ORAL at 08:30

## 2024-02-08 RX ADMIN — PROPRANOLOL HYDROCHLORIDE 10 MG: 10 TABLET ORAL at 21:15

## 2024-02-08 NOTE — CASE MANAGEMENT
Writer spoke with Wanda patients . Wanda shared she submitted her two weeks notice and that patient will be appointed a new .

## 2024-02-08 NOTE — NURSING NOTE
Pt observed in milieu and dayroom for dinner. Pt has been resting more and feeling tired. Medication and meal compliant. Pt denies all psychiatric symptoms with exception to AH. Pt is pleasant and social when awake. No unmet needs this shift.

## 2024-02-08 NOTE — PROGRESS NOTES
02/08/24 1526   Team Meeting   Meeting Type Daily Rounds   Team Members Present   Team Members Present Physician;Nurse;   Physician Team Member Mark   Nursing Team Member Giancarlo   Care Management Team Member Miguelito   Patient/Family Present   Patient Present No   Patient's Family Present No     Patient currently holds 201 status. Patient continues to report AH but denies all other symptoms. Patient is medication and meal compliant. Patient to continue on Clozaril, staff to monitor. Patient discharge date and time is to be determined.

## 2024-02-08 NOTE — NURSING NOTE
Pt observed in milieu and dayroom. Social and cooperative with staff and peers. Pt is wearing clothing of his home. Medication and meal compliant. Pt denies SI/HI/VH, or pain. Pt attends groups. Pt has been sleeping throughout shift but alert when awake. No unmet needs.

## 2024-02-08 NOTE — PROGRESS NOTES
"Progress Note - Behavioral Health     Alberto Berumen 28 y.o. male MRN: [id number]   Unit/Bed#: [facility] 383-02 Encounter: [id number]    Documentation, nursing notes, medication reconciliation, labs, and vitals reviewed. Patient was seen for continuing care and reviewed with treatment team. No acute events over the past 24 hours.  Per nursing report, has been resting more and complaining of feeling more tired.  Denies psychiatric symptoms with the exception of auditory hallucinations.  Patient is pleasant and social when awake.    Medication changes over the past 24 hours. Continues to tolerate current medications with no adverse effects.     On evaluation today, continues to isolate to his room, still reports auditory hallucinations of voices saying they will kill him and his family and that he is going to hell.  Today complaining of feeling tired, otherwise reports mood \"okay \"..  No suicidal ideation, plan, or intent.  Ongoing perceptual disturbances and does not exhibit any symptoms of aimee on evaluation.    Psychiatric ROS:  Behavior over the last 24 hours: minimal improvement  Sleep: hypersomnia  Appetite: fair  Medication side effects: No   ROS: no complaints, all other systems are negative    Mental Status Evaluation:    Appearance:  marginal hygiene   Behavior:  cooperative, guarded   Speech:  scant   Mood:  depressed   Affect:  blunted   Thought Process:  decreased rate of thoughts   Associations: concrete associations   Thought Content:  some paranoia   Perceptual Disturbances: auditory hallucinations   Risk Potential: Suicidal ideation - None  Homicidal ideation - None  Potential for aggression - No   Sensorium:  oriented to person, place, and time/date   Memory:  recent and remote memory grossly intact   Consciousness:  alert and awake   Attention/Concentration: decreased concentration and decreased attention span   Insight:  limited   Judgment: limited   Gait/Station: normal gait/station, normal balance "   Motor Activity: no abnormal movements     Vital signs in last 24 hours:    Temp:  [97.6 °F (36.4 °C)-97.9 °F (36.6 °C)] 97.9 °F (36.6 °C)  HR:  [] 100  Resp:  [16-17] 16  BP: (125-134)/(64-86) 134/86    Laboratory results: I have personally reviewed all pertinent laboratory/tests results    Results from the past 24 hours: No results found for this or any previous visit (from the past 24 hour(s)).    Suicide/Homicide Risk Assessment:    Risk of Harm to Self:   Current Specific Risk Factors include: current psychotic symptoms  Protective Factors: no current suicidal ideation, ability to communicate with staff on the unit, able to contract for safety on the unit, taking medications as ordered on the unit  Based on today's assessment, Alberto presents the following risk of harm to self: minimal    Risk of Harm to Others:  Current Specific Risk Factors include: current psychotic symptoms  Protective Factors: no current homicidal ideation, able to communicate with staff on the unit, mood is stabilizing, compliant with medications on the unit as ordered  Based on today's assessment, Alberto presents the following risk of harm to others: minimal    The following interventions are recommended: behavioral checks every 7 minutes, continued hospitalization on locked unit    Progress Toward Goals: no significant improvement, depression is improving, poor motivation, continues to endorse psychotic symptoms    Assessment/Plan   Principal Problem:    Schizoaffective disorder, bipolar type (HCC)  Active Problems:    GERD (gastroesophageal reflux disease)    Tobacco abuse    T wave inversion in EKG    Chronic idiopathic constipation    Vitamin B 12 deficiency    Vitamin D deficiency    Acanthosis nigricans    Rash      Recommended Treatment:     Planned medication and treatment changes:    All current active medications have been reviewed  Encourage group therapy, milieu therapy and occupational therapy  Behavioral Health  checks every 7 minutes  Check clozapine level tomorrow  Monitor weekly EKG, CBC, CK, CRP, BNP, troponin every Monday for Clozaril monitoring    Continue current medications:    Current Facility-Administered Medications   Medication Dose Route Frequency Provider Last Rate    acetaminophen  650 mg Oral Q6H PRN Yasmin Harvey MD      acetaminophen  650 mg Oral Q4H PRN Yasmin Harvey MD      acetaminophen  975 mg Oral Q6H PRN Yasmin Harvey MD      aluminum-magnesium hydroxide-simethicone  30 mL Oral Q4H PRN Yasmin Harvey MD      atropine  1 drop Sublingual HS HOLLI Anderson      atropine  1 drop Sublingual Daily PRN HOLLI Anderson      haloperidol lactate  2.5 mg Intramuscular Q4H PRN Max 4/day Yasmin Harvey MD      And    LORazepam  1 mg Intramuscular Q4H PRN Max 4/day Yasmin Harvey MD      And    benztropine  0.5 mg Intramuscular Q4H PRN Max 4/day Yasmin Harvey MD      haloperidol lactate  5 mg Intramuscular Q4H PRN Max 4/day Yasmin Harvey MD      And    LORazepam  2 mg Intramuscular Q4H PRN Max 4/day Yasmin Harvey MD      And    benztropine  1 mg Intramuscular Q4H PRN Max 4/day Yasmin Harvey MD      benztropine  1 mg Oral Q4H PRN Max 6/day Yasmin Harvey MD      bisacodyl  10 mg Rectal Daily PRN Yasmin Harvey MD      cloZAPine  300 mg Oral HS HOLLI Anderson      hydrOXYzine HCL  50 mg Oral Q6H PRN Max 4/day Yasmin Harvey MD      Or    diphenhydrAMINE  50 mg Intramuscular Q6H PRN Yasmin Harvey MD      diphenhydrAMINE-zinc acetate   Topical BID PRN Anjel Sagastume MD      escitalopram  20 mg Oral Daily La Flores MD      haloperidol  1 mg Oral Q6H PRN Yasmin Harvey MD      haloperidol  2.5 mg Oral Q4H PRN Max 4/day Yasmin Harvey MD      haloperidol  5 mg Oral Q4H PRN Max 4/day Yasmin Hravey MD      hydrocortisone   Topical 4x Daily PRN HOLLI Monge      hydrOXYzine HCL  100 mg Oral Q6H PRN Max 4/day Yasmin Harvey MD       Or    LORazepam  2 mg Intramuscular Q6H PRN Yasmin Harvey MD      hydrOXYzine HCL  25 mg Oral Q6H PRN Max 4/day Yasmin Harvey MD      melatonin  3 mg Oral HS Yasmin Harvey MD      metFORMIN  500 mg Oral Daily With Breakfast HOLLI Monge      minocycline  100 mg Oral Q12H [facility] HOLLI Monge      mirtazapine  45 mg Oral HS La Flores MD      nicotine polacrilex  4 mg Oral Q2H PRN Yasmin Harvey MD      polyethylene glycol  17 g Oral Daily PRN Yasmin Harvey MD      polyethylene glycol  17 g Oral Daily HOLLI Monge      propranolol  10 mg Oral Q12H [facility] HOLLI Anderson      senna-docusate sodium  1 tablet Oral Daily PRN Yasmin Harvey MD      senna-docusate sodium  1 tablet Oral BID La Flores MD      traZODone  50 mg Oral HS PRN Yasmin Harvey MD      white petrolatum-mineral oil   Topical TID PRN Anjel Sagastume MD       Risks / Benefits of Treatment:    Risks, benefits, and possible side effects of medications explained to patient and patient verbalizes understanding and agreement for treatment.    Counseling / Coordination of Care:    Patient's progress discussed with staff in treatment team meeting.  Medications, treatment progress and treatment plan reviewed with patient.    HOLLI Anderson 02/08/24

## 2024-02-09 LAB — CLOZAPINE SERPL-MCNC: 339 NG/ML (ref 350–900)

## 2024-02-09 PROCEDURE — 99232 SBSQ HOSP IP/OBS MODERATE 35: CPT | Performed by: PSYCHIATRY & NEUROLOGY

## 2024-02-09 PROCEDURE — 80159 DRUG ASSAY CLOZAPINE: CPT | Performed by: NURSE PRACTITIONER

## 2024-02-09 RX ADMIN — MINOCYCLINE HYDROCHLORIDE 100 MG: 100 CAPSULE ORAL at 21:25

## 2024-02-09 RX ADMIN — SENNOSIDES AND DOCUSATE SODIUM 1 TABLET: 8.6; 5 TABLET ORAL at 17:33

## 2024-02-09 RX ADMIN — CLOZAPINE 300 MG: 100 TABLET ORAL at 21:20

## 2024-02-09 RX ADMIN — ESCITALOPRAM OXALATE 20 MG: 10 TABLET ORAL at 08:14

## 2024-02-09 RX ADMIN — NICOTINE POLACRILEX 4 MG: 4 GUM, CHEWING ORAL at 12:40

## 2024-02-09 RX ADMIN — POLYETHYLENE GLYCOL 3350 17 G: 17 POWDER, FOR SOLUTION ORAL at 08:13

## 2024-02-09 RX ADMIN — MIRTAZAPINE 45 MG: 30 TABLET, FILM COATED ORAL at 21:21

## 2024-02-09 RX ADMIN — PROPRANOLOL HYDROCHLORIDE 10 MG: 10 TABLET ORAL at 08:14

## 2024-02-09 RX ADMIN — SENNOSIDES AND DOCUSATE SODIUM 1 TABLET: 8.6; 5 TABLET ORAL at 08:14

## 2024-02-09 RX ADMIN — MINOCYCLINE HYDROCHLORIDE 100 MG: 100 CAPSULE ORAL at 08:39

## 2024-02-09 RX ADMIN — NICOTINE POLACRILEX 4 MG: 4 GUM, CHEWING ORAL at 18:05

## 2024-02-09 RX ADMIN — MELATONIN TAB 3 MG 3 MG: 3 TAB at 21:21

## 2024-02-09 RX ADMIN — METFORMIN HYDROCHLORIDE 500 MG: 500 TABLET ORAL at 08:14

## 2024-02-09 RX ADMIN — NICOTINE POLACRILEX 4 MG: 4 GUM, CHEWING ORAL at 15:40

## 2024-02-09 RX ADMIN — PROPRANOLOL HYDROCHLORIDE 10 MG: 10 TABLET ORAL at 21:21

## 2024-02-09 RX ADMIN — ATROPINE SULFATE 1 DROP: 10 SOLUTION/ DROPS OPHTHALMIC at 21:25

## 2024-02-09 NOTE — PROGRESS NOTES
02/09/24 [id number]   Team Meeting   Meeting Type Daily Rounds   Team Members Present   Team Members Present Physician;Nurse;   Physician Team Member Mark   Nursing Team Member Giancarlo   Care Management Team Member Miguelito   Patient/Family Present   Patient Present No   Patient's Family Present No     Patient currently holds 201 status. Patient continues to report AH but denies all other symptoms. Patient is medication and meal compliant. Patient to continue on all current scheduled medications. Patient discharge date and time to be determined.

## 2024-02-09 NOTE — PROGRESS NOTES
"Progress Note - Behavioral Health     Alberto Berumen 28 y.o. male MRN: [id number]   Unit/Bed#: -02 Encounter: [id number]    Behavior over the last 24 hours: unchanged.     Alberto still reports some depression and rates his mood as 5 on a scale of 1 (best mood) to 10 (worst mood) today. He continues to report auditory hallucinations of \"voices saying that they will kill me and my family and that I am going to hell\". Seclusive to his room and stays in bed. Has been taking medications. Minimal group attendance.    Sleep: normal  Appetite: normal  Medication side effects: Yes - dry mouth   ROS: reports dry mouth, denies any shortness of breath or chest pain, all other systems are negative    Mental Status Evaluation:    Appearance:  disheveled   Behavior:  cooperative, poor eye contact   Speech:  scant, soft   Mood:  depressed   Affect:  flat   Thought Process:  decreased rate of thoughts, concrete   Associations: concrete associations   Thought Content:  paranoid ideation   Perceptual Disturbances: auditory hallucinations of \"voices saying that they will hill me and my family and that I am going to hell\", no visual hallucinations   Risk Potential: Suicidal ideation - None at present  Homicidal ideation - None  Potential for aggression - No   Sensorium:  oriented to person, place, and time/date   Memory:  recent and remote memory grossly intact   Consciousness:  alert and awake   Attention/Concentration: poor concentration and poor attention span   Insight:  impaired   Judgment: impaired   Gait/Station: normal gait/station, normal balance   Motor Activity: no abnormal movements     Vital signs in last 24 hours:    Temp:  [96.7 °F (35.9 °C)-97.2 °F (36.2 °C)] 96.7 °F (35.9 °C)  HR:  [] 66  Resp:  [16] 16  BP: (118-143)/(55-87) 118/55    Laboratory results: I have personally reviewed all pertinent laboratory/tests results    Results from the past 24 hours:   Recent Results (from the past 24 hour(s)) "   Clozapine[facility]    Collection Time: 02/09/24  8:22 PM   Result Value Ref Range    Clozapine Lvl 339 (L) 350 - 900 ng/mL       Suicide/Homicide Risk Assessment:    Risk of Harm to Self:   Nursing Suicide Risk Assessment Last 24 hours: C-SSRS Risk (Since Last Contact)  Calculated C-SSRS Risk Score (Since Last Contact): No Risk Indicated  Current Specific Risk Factors include: mental illness diagnosis, current depressive symptoms, current psychotic symptoms  Protective Factors: no current suicidal ideation, ability to communicate with staff on the unit, taking medications as ordered on the unit  Based on today's assessment, Alberto presents the following risk of harm to self: low    Risk of Harm to Others:  Nursing Homicide Risk Assessment: Violence Risk to Others: Denies within past 6 months  Based on today's assessment, Alberto presents the following risk of harm to others: low    The following interventions are recommended: behavioral checks every 7 minutes, continued hospitalization on locked unit    Progress Toward Goals: no significant progress, still depressed, poor motivation, denies current suicidal thoughts, still reports psychotic symptoms    Assessment/Plan   Principal Problem:    Schizoaffective disorder, bipolar type ([facility])  Active Problems:    GERD (gastroesophageal reflux disease)    Tobacco abuse    T wave inversion in EKG    Chronic idiopathic constipation    Vitamin B 12 deficiency    Vitamin D deficiency    Acanthosis nigricans    Rash      Recommended Treatment:     Planned medication and treatment changes:    All current active medications have been reviewed  Encourage group therapy, milieu therapy and occupational therapy  Behavioral Health checks every 7 minutes  Increase Clozaril to 325 mg at bedtime to help with psychotic symptoms. Clozaril level is subtherapeutic at 339  Increase Senokot to 2 tablets bid for bowel regimen - he denies constipation, but states that he has been having less  frequent bowel movements than previously. He reports last BM yesterday  Monitor CBC/diff, CPK, C-reactive protein, BNP, troponin, and ECG weekly    Continue all other medications:    Current Facility-Administered Medications   Medication Dose Route Frequency Provider Last Rate    acetaminophen  650 mg Oral Q6H PRN Yasmin Harvey MD      acetaminophen  650 mg Oral Q4H PRN Yasmin Harvey MD      acetaminophen  975 mg Oral Q6H PRN Yasmin Harvey MD      aluminum-magnesium hydroxide-simethicone  30 mL Oral Q4H PRN Yasmin Harvey MD      atropine  1 drop Sublingual HS HOLLI Anderson      atropine  1 drop Sublingual Daily PRN HOLLI Anderson      haloperidol lactate  2.5 mg Intramuscular Q4H PRN Max 4/day Yasmin Harvey MD      And    LORazepam  1 mg Intramuscular Q4H PRN Max 4/day Yasmin Harvey MD      And    benztropine  0.5 mg Intramuscular Q4H PRN Max 4/day Yasmin Harvey MD      haloperidol lactate  5 mg Intramuscular Q4H PRN Max 4/day Yasmin Harvey MD      And    LORazepam  2 mg Intramuscular Q4H PRN Max 4/day Yasmin Harvey MD      And    benztropine  1 mg Intramuscular Q4H PRN Max 4/day Yasmin Harvey MD      benztropine  1 mg Oral Q4H PRN Max 6/day Yasmin Harvey MD      bisacodyl  10 mg Rectal Daily PRN Yasmin Harvey MD      cloZAPine  325 mg Oral HS La Flores MD      hydrOXYzine HCL  50 mg Oral Q6H PRN Max 4/day Yasmin Harvey MD      Or    diphenhydrAMINE  50 mg Intramuscular Q6H PRN Yasmin Harvey MD      diphenhydrAMINE-zinc acetate   Topical BID PRN Anjel Sagastume MD      escitalopram  20 mg Oral Daily La Flores MD      haloperidol  1 mg Oral Q6H PRN Yasmin Harvey MD      haloperidol  2.5 mg Oral Q4H PRN Max 4/day Yasmin Harvey MD      haloperidol  5 mg Oral Q4H PRN Max 4/day Yasmin Harvey MD      hydrocortisone   Topical 4x Daily PRN STEVE MongeNP      hydrOXYzine HCL  100 mg Oral Q6H PRN Max 4/day Yasmin JACKMAN  MD Candice      Or    LORazepam  2 mg Intramuscular Q6H PRN Yasmin Harvey MD      hydrOXYzine HCL  25 mg Oral Q6H PRN Max 4/day Yasmin Harvey MD      melatonin  3 mg Oral HS Yasmin Harvey MD      metFORMIN  500 mg Oral Daily With Breakfast Eileen HOLLI Higuera      minocycline  100 mg Oral Q12H KAYLIE EileenHOLLI Cummins      mirtazapine  45 mg Oral HS La Flores MD      nicotine polacrilex  4 mg Oral Q2H PRN Yasmin Harvey MD      polyethylene glycol  17 g Oral Daily PRN Yasmin Harvey MD      polyethylene glycol  17 g Oral Daily Eileen CherryHOLLI Jimenez      propranolol  10 mg Oral Q12H [facility] HOLLI Anderson      senna-docusate sodium  1 tablet Oral Daily PRN Yasmin Harvey MD      senna-docusate sodium  2 tablet Oral BID La Flores MD      traZODone  50 mg Oral HS PRN Yasmin Harvey MD      white petrolatum-mineral oil   Topical TID PRN Anjel Sagastume MD       Risks / Benefits of Treatment:    Risks, benefits, and possible side effects of medications explained to patient. Patient has limited understanding of risks and benefits of treatment at this time, but agrees to take medications as prescribed.    Counseling / Coordination of Care:    Patient's progress discussed with staff in treatment team meeting.  Medications, treatment progress and treatment plan reviewed with patient.  Medication changes discussed with patient.    La Flores MD 02/10/24

## 2024-02-09 NOTE — NURSING NOTE
Pt awake, mainly isolative to room, declined breakfast this morning. Denies SI/HI/VH, continues to report AH that have been ongoing. Encouraged to attend group. Pt compliant with scheduled medications. Reports adequate sleep. Denies any unmet needs at this time. Q7 minute safety checks maintained.

## 2024-02-09 NOTE — NURSING NOTE
Pt visible and social with peers. Pacing milieu. Denies unmet needs, denies si.hi.vh. pt admits to having AH. compliant with evening meds. Continued q7m checks for safety

## 2024-02-09 NOTE — PROGRESS NOTES
Fairly well controlled  Continue home regimen for now and monitor Progress Note - Behavioral Health     Alberto Berumen 28 y.o. male MRN: [id number]   Unit/Bed#: [facility] 383-02 Encounter: [id number]    Documentation, nursing notes, medication reconciliation, labs, and vitals reviewed. Patient was seen for continuing care and reviewed with treatment team. No acute events over the past 24 hours.  Per nursing report, he has been more isolative to his room the past couple of days and complains of daytime tiredness.  Continues to report to staff no change in auditory hallucinations.  .  No medication changes over the past 24 hours. Continues to tolerate current medications with no adverse effects.     He is seen in his room and resting in bed.  On evaluation today, he is seen in his bed and napping and snoring loudly.  Continues to state he does note some improvement in his depression, but last 2 days has had a decrease in energy and motivation and complaining of tiredness.  He continues to report no improvement in his auditory hallucinations.  Continues to state he hears voices threatening him and his family and telling him they will go to hell.    No suicidal ideation, plan, or intent.  Ongoing perceptual disturbances and does not exhibit any symptoms of aimee on evaluation.    Psychiatric ROS:  Behavior over the last 24 hours: unchanged  Sleep: hypersomnia  Appetite: fair  Medication side effects: Yes - tiredness   ROS: no complaints, all other systems are negative    Mental Status Evaluation:    Appearance:  marginal hygiene   Behavior:  cooperative   Speech:  scant   Mood:  depressed   Affect:  flat   Thought Process:  decreased rate of thoughts   Associations: concrete associations   Thought Content:  Hindu preoccupation, paranoid ideation   Perceptual Disturbances: auditory hallucinations   Risk Potential: Suicidal ideation - None  Homicidal ideation - None  Potential for aggression - No   Sensorium:  oriented to person, place, and time/date   Memory:  recent and remote memory  grossly intact   Consciousness:  alert and awake   Attention/Concentration: decreased concentration and decreased attention span   Insight:  limited   Judgment: limited   Gait/Station: normal gait/station, normal balance   Motor Activity: no abnormal movements     Vital signs in last 24 hours:    Temp:  [97.5 °F (36.4 °C)-98 °F (36.7 °C)] 98 °F (36.7 °C)  HR:  [] 98  Resp:  [18] 18  BP: (123-137)/(75-94) 132/86    Laboratory results: I have personally reviewed all pertinent laboratory/tests results    Results from the past 24 hours: No results found for this or any previous visit (from the past 24 hour(s)).    Suicide/Homicide Risk Assessment:    Risk of Harm to Self:   Current Specific Risk Factors include: current depressive symptoms, current psychotic symptoms  Protective Factors: no current suicidal ideation, ability to communicate with staff on the unit, able to contract for safety on the unit, taking medications as ordered on the unit  Based on today's assessment, Alberto presents the following risk of harm to self: minimal    Risk of Harm to Others:  Current Specific Risk Factors include: current psychotic symptoms  Protective Factors: homicidal thoughts are less frequent, impulse control is improving, mood is stabilizing, psychotic symptoms are less prominent  Based on today's assessment, Alberto presents the following risk of harm to others: minimal    The following interventions are recommended: behavioral checks every 7 minutes, continued hospitalization on locked unit    Progress Toward Goals: gradual improvement, attends groups more often, continues to report psychotic    Assessment/Plan   Principal Problem:    Schizoaffective disorder, bipolar type ([facility])  Active Problems:    GERD (gastroesophageal reflux disease)    Tobacco abuse    T wave inversion in EKG    Chronic idiopathic constipation    Vitamin B 12 deficiency    Vitamin D deficiency    Acanthosis nigricans    Rash      Recommended  Treatment:     Planned medication and treatment changes:    All current active medications have been reviewed  Encourage group therapy, milieu therapy and occupational therapy  Behavioral Health checks every 7 minutes  For Clozaril level later today.  With plan to gradually titrate up on his Clozaril as tolerated based on this level and heart rate    Monitor weekly CBC, CK, CRP, BNP, troponin, and EKG weekly on Mondays    Continue all other psychiatric medications the same    Current Facility-Administered Medications   Medication Dose Route Frequency Provider Last Rate    acetaminophen  650 mg Oral Q6H PRN Yasmin Harvey MD      acetaminophen  650 mg Oral Q4H PRN Yasmin Harvey MD      acetaminophen  975 mg Oral Q6H PRN Yasmin Harvey MD      aluminum-magnesium hydroxide-simethicone  30 mL Oral Q4H PRN Yasmin Harvey MD      atropine  1 drop Sublingual HS HOLLI Anderson      atropine  1 drop Sublingual Daily PRN HOLLI Anderson      haloperidol lactate  2.5 mg Intramuscular Q4H PRN Max 4/day Yasmin Harvey MD      And    LORazepam  1 mg Intramuscular Q4H PRN Max 4/day Yasmin Harvey MD      And    benztropine  0.5 mg Intramuscular Q4H PRN Max 4/day Yasmin Harvey MD      haloperidol lactate  5 mg Intramuscular Q4H PRN Max 4/day Yasmin Harvey MD      And    LORazepam  2 mg Intramuscular Q4H PRN Max 4/day Yasmin Harvey MD      And    benztropine  1 mg Intramuscular Q4H PRN Max 4/day Yasmin Harvey MD      benztropine  1 mg Oral Q4H PRN Max 6/day Yasmin Harvey MD      bisacodyl  10 mg Rectal Daily PRN Yasmin Harvey MD      cloZAPine  300 mg Oral HS HOLLI Anderson      hydrOXYzine HCL  50 mg Oral Q6H PRN Max 4/day Yasmin Harvey MD      Or    diphenhydrAMINE  50 mg Intramuscular Q6H PRN Yasmin Harvey MD      diphenhydrAMINE-zinc acetate   Topical BID PRN Anjel Sagastume MD      escitalopram  20 mg Oral Daily La Flores MD      haloperidol  1 mg Oral Q6H PRN  Yasmin Harvey MD      haloperidol  2.5 mg Oral Q4H PRN Max 4/day Yasmin Harvey MD      haloperidol  5 mg Oral Q4H PRN Max 4/day Yasmin Harvey MD      hydrocortisone   Topical 4x Daily PRN HOLLI Monge      hydrOXYzine HCL  100 mg Oral Q6H PRN Max 4/day Yasmin Harvey MD      Or    LORazepam  2 mg Intramuscular Q6H PRN Yasmin Harvey MD      hydrOXYzine HCL  25 mg Oral Q6H PRN Max 4/day Yasmin Harvey MD      melatonin  3 mg Oral HS Yasmin Harvey MD      metFORMIN  500 mg Oral Daily With Breakfast HOLLI Monge      minocycline  100 mg Oral Q12H KAYLIE HOLLI Monge      mirtazapine  45 mg Oral HS La Flores MD      nicotine polacrilex  4 mg Oral Q2H PRN Yasmin Harvey MD      polyethylene glycol  17 g Oral Daily PRN Yasmin Harvey MD      polyethylene glycol  17 g Oral Daily HOLLI Monge      propranolol  10 mg Oral Q12H KAYLIE HOLLI Anderson      senna-docusate sodium  1 tablet Oral Daily PRN Yasmin Harvey MD      senna-docusate sodium  1 tablet Oral BID La Flores MD      traZODone  50 mg Oral HS PRN Yasmin Harvey MD      white petrolatum-mineral oil   Topical TID PRN Anjel Sagastume MD       Risks / Benefits of Treatment:    Risks, benefits, and possible side effects of medications explained to patient and patient verbalizes understanding and agreement for treatment.    Counseling / Coordination of Care:    Patient's progress discussed with staff in treatment team meeting.  Medications, treatment progress and treatment plan reviewed with patient.    HOLLI Anderson 02/09/24

## 2024-02-10 PROCEDURE — 99232 SBSQ HOSP IP/OBS MODERATE 35: CPT | Performed by: PSYCHIATRY & NEUROLOGY

## 2024-02-10 RX ORDER — AMOXICILLIN 250 MG
2 CAPSULE ORAL 2 TIMES DAILY
Status: DISCONTINUED | OUTPATIENT
Start: 2024-02-10 | End: 2024-03-29 | Stop reason: HOSPADM

## 2024-02-10 RX ADMIN — MIRTAZAPINE 45 MG: 30 TABLET, FILM COATED ORAL at 21:29

## 2024-02-10 RX ADMIN — PROPRANOLOL HYDROCHLORIDE 10 MG: 10 TABLET ORAL at 08:18

## 2024-02-10 RX ADMIN — MELATONIN TAB 3 MG 3 MG: 3 TAB at 21:29

## 2024-02-10 RX ADMIN — MINOCYCLINE HYDROCHLORIDE 100 MG: 100 CAPSULE ORAL at 08:19

## 2024-02-10 RX ADMIN — CLOZAPINE 325 MG: 100 TABLET ORAL at 21:29

## 2024-02-10 RX ADMIN — NICOTINE POLACRILEX 4 MG: 4 GUM, CHEWING ORAL at 17:31

## 2024-02-10 RX ADMIN — MINOCYCLINE HYDROCHLORIDE 100 MG: 100 CAPSULE ORAL at 21:29

## 2024-02-10 RX ADMIN — ESCITALOPRAM OXALATE 20 MG: 10 TABLET ORAL at 08:18

## 2024-02-10 RX ADMIN — NICOTINE POLACRILEX 4 MG: 4 GUM, CHEWING ORAL at 20:12

## 2024-02-10 RX ADMIN — METFORMIN HYDROCHLORIDE 500 MG: 500 TABLET ORAL at 08:18

## 2024-02-10 RX ADMIN — NICOTINE POLACRILEX 4 MG: 4 GUM, CHEWING ORAL at 13:30

## 2024-02-10 RX ADMIN — ATROPINE SULFATE 1 DROP: 10 SOLUTION/ DROPS OPHTHALMIC at 21:29

## 2024-02-10 RX ADMIN — SENNOSIDES AND DOCUSATE SODIUM 1 TABLET: 8.6; 5 TABLET ORAL at 08:18

## 2024-02-10 RX ADMIN — SENNOSIDES AND DOCUSATE SODIUM 2 TABLET: 8.6; 5 TABLET ORAL at 17:31

## 2024-02-10 RX ADMIN — PROPRANOLOL HYDROCHLORIDE 10 MG: 10 TABLET ORAL at 21:29

## 2024-02-10 RX ADMIN — POLYETHYLENE GLYCOL 3350 17 G: 17 POWDER, FOR SOLUTION ORAL at 08:19

## 2024-02-10 NOTE — PLAN OF CARE
Problem: Ineffective Coping  Goal: Participates in unit activities  Description: Interventions:  - Provide therapeutic environment   - Provide required programming   - Redirect inappropriate behaviors   Outcome: Not Progressing

## 2024-02-10 NOTE — PROGRESS NOTES
"Progress Note - Behavioral Health     Alberto Berumen 28 y.o. male MRN: [id number]   Unit/Bed#: [facility] 383-02 Encounter: [id number]    Behavior over the last 24 hours: minimal improvement.     Alberto continues to report auditory hallucinations \"Voices are still the same and say that they are going to kill me and my family\". He otherwise reports slight progress with his thinking \"I am not in my head as much\". Per staff report he has been slightly more interactive in milieu. Compliant with medications. Attends some groups.    Sleep: normal  Appetite: normal  Medication side effects: No - dry mouth has improved   ROS: no complaints, dry mouth is improved, denies any shortness of breath or chest pain, all other systems are negative    Mental Status Evaluation:    Appearance:  disheveled   Behavior:  cooperative, limited eye contact   Speech:  scant, soft   Mood:  depressed   Affect:  flat   Thought Process:  decreased rate of thoughts, concrete   Associations: concrete associations   Thought Content:  paranoid ideation   Perceptual Disturbances: auditory hallucinations of \"voices saying that they will kill me and my family\", no visual hallucinations   Risk Potential: Suicidal ideation - None at present  Homicidal ideation - None  Potential for aggression - No   Sensorium:  oriented to person, place, and time/date   Memory:  recent and remote memory grossly intact   Consciousness:  alert and awake   Attention/Concentration: poor concentration and poor attention span   Insight:  impaired   Judgment: impaired   Gait/Station: normal gait/station, normal balance   Motor Activity: no abnormal movements     Vital signs in last 24 hours:    Temp:  [96.9 °F (36.1 °C)-97.4 °F (36.3 °C)] 96.9 °F (36.1 °C)  HR:  [91-99] 91  Resp:  [16] 16  BP: (122-135)/(78-80) 122/78    Laboratory results: I have personally reviewed all pertinent laboratory/tests results    Results from the past 24 hours: No results found for this or any previous " visit (from the past 24 hour(s)).    Suicide/Homicide Risk Assessment:    Risk of Harm to Self:   Nursing Suicide Risk Assessment Last 24 hours: C-SSRS Risk (Since Last Contact)  Calculated C-SSRS Risk Score (Since Last Contact): No Risk Indicated  Current Specific Risk Factors include: mental illness diagnosis, current psychotic symptoms, presence of hallucinations  Protective Factors: no current suicidal ideation, ability to communicate with staff on the unit, taking medications as ordered on the unit  Based on today's assessment, Alberto presents the following risk of harm to self: low    Risk of Harm to Others:  Nursing Homicide Risk Assessment: Violence Risk to Others: Denies within past 6 months  Based on today's assessment, Alberto presents the following risk of harm to others: low    The following interventions are recommended: behavioral checks every 7 minutes, continued hospitalization on locked unit    Progress Toward Goals: minimal improvement, still depressed, denies current suicidal thoughts, still reports psychotic symptoms, slightly more visible in milieu    Assessment/Plan   Principal Problem:    Schizoaffective disorder, bipolar type ([facility])  Active Problems:    GERD (gastroesophageal reflux disease)    Tobacco abuse    T wave inversion in EKG    Chronic idiopathic constipation    Vitamin B 12 deficiency    Vitamin D deficiency    Acanthosis nigricans    Rash      Recommended Treatment:     Planned medication and treatment changes:    All current active medications have been reviewed  Encourage group therapy, milieu therapy and occupational therapy  Behavioral Health checks every 7 minutes  Increase Clozaril to 350 mg at bedtime to help with psychotic symptoms  Monitor CBC/diff, CPK, C-reactive protein, BNP, troponin, and ECG weekly    Continue all other medications:    Current Facility-Administered Medications   Medication Dose Route Frequency Provider Last Rate    acetaminophen  650 mg Oral Q6H PRN  Yasmin Harvey MD      acetaminophen  650 mg Oral Q4H PRN Yasmin Harvey MD      acetaminophen  975 mg Oral Q6H PRN Yasmin Harvey MD      aluminum-magnesium hydroxide-simethicone  30 mL Oral Q4H PRN Yasmin Harvey MD      atropine  1 drop Sublingual HS Prisca Villafuerte, HOLLI      atropine  1 drop Sublingual Daily PRN HOLLI Anderson      haloperidol lactate  2.5 mg Intramuscular Q4H PRN Max 4/day Yasmin Harvey MD      And    LORazepam  1 mg Intramuscular Q4H PRN Max 4/day Yasmin Harvey MD      And    benztropine  0.5 mg Intramuscular Q4H PRN Max 4/day Yasmin Harvey MD      haloperidol lactate  5 mg Intramuscular Q4H PRN Max 4/day Yasmin Harvey MD      And    LORazepam  2 mg Intramuscular Q4H PRN Max 4/day Yasmin Harvey MD      And    benztropine  1 mg Intramuscular Q4H PRN Max 4/day Yasmin Harvey MD      benztropine  1 mg Oral Q4H PRN Max 6/day Yasmin Harvey MD      bisacodyl  10 mg Rectal Daily PRN Yasmin Harvey MD      cloZAPine  350 mg Oral HS La Flores MD      hydrOXYzine HCL  50 mg Oral Q6H PRN Max 4/day Yasmin Harvey MD      Or    diphenhydrAMINE  50 mg Intramuscular Q6H PRN Yasmin Harvey MD      diphenhydrAMINE-zinc acetate   Topical BID PRN Anjel Sagastume MD      escitalopram  20 mg Oral Daily La Flores MD      haloperidol  1 mg Oral Q6H PRN Yasmin Harvey MD      haloperidol  2.5 mg Oral Q4H PRN Max 4/day Yasmin Harvey MD      haloperidol  5 mg Oral Q4H PRN Max 4/day Yasmin Harvey MD      hydrocortisone   Topical 4x Daily PRN Eileen Jensen, HOLLI      hydrOXYzine HCL  100 mg Oral Q6H PRN Max 4/day Yasmin Harvey MD      Or    LORazepam  2 mg Intramuscular Q6H PRN Yasmin Harvey MD      hydrOXYzine HCL  25 mg Oral Q6H PRN Max 4/day Yasmin Harvey MD      melatonin  3 mg Oral HS Yasmin Harvey MD      metFORMIN  500 mg Oral Daily With Breakfast HOLLI Monge      minocycline  100 mg Oral Q12H KAYLIE  [FreeTextEntry1] : Gen: Patient is A&O x 3, NAD\par HEENT: EOMI, hearing grossly normal\par Resp: regular, non - labored\par CV: pulses regular\par Skin: no rashes, erythema\par Lymph: no clubbing, cyanosis, edema, or palpable lymphadenopathy\par Inspection: no instability or misalignment\par ROM: full throughout\par Palpation:TTP left PSIS, bilateral periscapular region, left cervical paraspinals \par Sensation: intact to light touch\par Reflexes: 3+ in b/l LE\par Strength: 4/5 throughout\par Special tests: -straight leg raise\par Gait: ambulates with rolling walker \par Tone: MAS 1 in LLE \par \par Osteopathic Structural Exam:\par Cranial: Right torsion   \par Cervical: Left C3, C5 tenderpoint \par Thoracic: T1-T4 NRLSBR\par Rib: Bilateral rib 1 tenderpoint \par Upper extremity:  left anterior GH joint, left trapezius tenderpoint, left levator scapula tenderpoint  \par Lumbar: Left L4, L5 posterior tenderpoint  \par Lower Extremity: Left lower extremity restricted in internal rotation \par Pelvis: left medial innominate\par Sacrum: Left sacral torsion \par \par \par \par \par  HOLLI Monge      mirtazapine  45 mg Oral HS La Flores MD      nicotine polacrilex  4 mg Oral Q2H PRN Yasmin Harvey MD      polyethylene glycol  17 g Oral Daily PRN Yasmin Harvey MD      polyethylene glycol  17 g Oral Daily HOLLI Monge      propranolol  10 mg Oral Q12H [facility] HOLLI Anderson      senna-docusate sodium  1 tablet Oral Daily PRN Yasmin Harvey MD      senna-docusate sodium  2 tablet Oral BID La Flores MD      traZODone  50 mg Oral HS PRN Yasmin Harvey MD      white petrolatum-mineral oil   Topical TID PRN Anjel Sagastume MD       Risks / Benefits of Treatment:    Risks, benefits, and possible side effects of medications explained to patient. Patient has limited understanding of risks and benefits of treatment at this time, but agrees to take medications as prescribed.    Counseling / Coordination of Care:    Patient's progress discussed with staff in treatment team meeting.  Medications, treatment progress and treatment plan reviewed with patient.  Medication changes discussed with patient.    La Flores MD 02/11/24

## 2024-02-10 NOTE — NURSING NOTE
Patient is in the community and social with peers. Patient reports AH, but not as bad. Patient denies SI at this time. Patient is compliant with scheduled medications. Staff maintained continuous rounding for safety and support.

## 2024-02-10 NOTE — NURSING NOTE
Patient is isolative to room and self. Slept most of sleep. Denies SI/HI/AVH. Medication and meal compliant. Denies any unmet needs at this time. Q 7 minute safety checks maintained.

## 2024-02-11 PROCEDURE — 99232 SBSQ HOSP IP/OBS MODERATE 35: CPT | Performed by: PSYCHIATRY & NEUROLOGY

## 2024-02-11 RX ADMIN — MINOCYCLINE HYDROCHLORIDE 100 MG: 100 CAPSULE ORAL at 21:29

## 2024-02-11 RX ADMIN — PROPRANOLOL HYDROCHLORIDE 10 MG: 10 TABLET ORAL at 21:29

## 2024-02-11 RX ADMIN — POLYETHYLENE GLYCOL 3350 17 G: 17 POWDER, FOR SOLUTION ORAL at 08:46

## 2024-02-11 RX ADMIN — MIRTAZAPINE 45 MG: 30 TABLET, FILM COATED ORAL at 21:29

## 2024-02-11 RX ADMIN — PROPRANOLOL HYDROCHLORIDE 10 MG: 10 TABLET ORAL at 08:46

## 2024-02-11 RX ADMIN — NICOTINE POLACRILEX 4 MG: 4 GUM, CHEWING ORAL at 18:04

## 2024-02-11 RX ADMIN — MINOCYCLINE HYDROCHLORIDE 100 MG: 100 CAPSULE ORAL at 08:46

## 2024-02-11 RX ADMIN — NICOTINE POLACRILEX 4 MG: 4 GUM, CHEWING ORAL at 20:00

## 2024-02-11 RX ADMIN — CLOZAPINE 350 MG: 100 TABLET ORAL at 21:29

## 2024-02-11 RX ADMIN — ESCITALOPRAM OXALATE 20 MG: 10 TABLET ORAL at 08:46

## 2024-02-11 RX ADMIN — NICOTINE POLACRILEX 4 MG: 4 GUM, CHEWING ORAL at 10:30

## 2024-02-11 RX ADMIN — SENNOSIDES AND DOCUSATE SODIUM 2 TABLET: 8.6; 5 TABLET ORAL at 17:19

## 2024-02-11 RX ADMIN — ATROPINE SULFATE 1 DROP: 10 SOLUTION/ DROPS OPHTHALMIC at 21:29

## 2024-02-11 RX ADMIN — NICOTINE POLACRILEX 4 MG: 4 GUM, CHEWING ORAL at 14:06

## 2024-02-11 RX ADMIN — MELATONIN TAB 3 MG 3 MG: 3 TAB at 21:29

## 2024-02-11 RX ADMIN — SENNOSIDES AND DOCUSATE SODIUM 2 TABLET: 8.6; 5 TABLET ORAL at 08:46

## 2024-02-11 RX ADMIN — METFORMIN HYDROCHLORIDE 500 MG: 500 TABLET ORAL at 08:46

## 2024-02-11 NOTE — NURSING NOTE
"Pt is calm and cooperative upon approach, isolative to room this morning. Pt did not eat breakfast, but was compliant with morning medication. Pt denies SI, HI, VH. Pt is reports AH: \"the same voices as always.\" Denies any further needs at this time.  "

## 2024-02-11 NOTE — NURSING NOTE
"Patient is in the community and social with peers. Patient continues to report AH, stating \"They are always there and it scares me sometimes.\" Patient denies SI. Patient is compliant with scheduled medications. Staff maintained continuous rounding for safety and support.  "

## 2024-02-12 LAB
BASOPHILS # BLD AUTO: 0.06 THOUSANDS/ÂΜL (ref 0–0.1)
BASOPHILS NFR BLD AUTO: 1 % (ref 0–1)
BNP SERPL-MCNC: 8 PG/ML (ref 0–100)
CARDIAC TROPONIN I PNL SERPL HS: 3 NG/L (ref 8–18)
CK SERPL-CCNC: 175 U/L (ref 39–308)
CRP SERPL QL: 6.6 MG/L
EOSINOPHIL # BLD AUTO: 0.27 THOUSAND/ÂΜL (ref 0–0.61)
EOSINOPHIL NFR BLD AUTO: 3 % (ref 0–6)
ERYTHROCYTE [DISTWIDTH] IN BLOOD BY AUTOMATED COUNT: 13.2 % (ref 11.6–15.1)
HCT VFR BLD AUTO: 40.3 % (ref 36.5–49.3)
HGB BLD-MCNC: 11.8 G/DL (ref 12–17)
IMM GRANULOCYTES # BLD AUTO: 0.03 THOUSAND/UL (ref 0–0.2)
IMM GRANULOCYTES NFR BLD AUTO: 0 % (ref 0–2)
LYMPHOCYTES # BLD AUTO: 2.87 THOUSANDS/ÂΜL (ref 0.6–4.47)
LYMPHOCYTES NFR BLD AUTO: 36 % (ref 14–44)
MCH RBC QN AUTO: 23.6 PG (ref 26.8–34.3)
MCHC RBC AUTO-ENTMCNC: 29.3 G/DL (ref 31.4–37.4)
MCV RBC AUTO: 80 FL (ref 82–98)
MONOCYTES # BLD AUTO: 0.75 THOUSAND/ÂΜL (ref 0.17–1.22)
MONOCYTES NFR BLD AUTO: 9 % (ref 4–12)
NEUTROPHILS # BLD AUTO: 4.11 THOUSANDS/ÂΜL (ref 1.85–7.62)
NEUTS SEG NFR BLD AUTO: 51 % (ref 43–75)
NRBC BLD AUTO-RTO: 0 /100 WBCS
PLATELET # BLD AUTO: 227 THOUSANDS/UL (ref 149–390)
PMV BLD AUTO: 10.5 FL (ref 8.9–12.7)
RBC # BLD AUTO: 5.01 MILLION/UL (ref 3.88–5.62)
WBC # BLD AUTO: 8.09 THOUSAND/UL (ref 4.31–10.16)

## 2024-02-12 PROCEDURE — 83880 ASSAY OF NATRIURETIC PEPTIDE: CPT | Performed by: NURSE PRACTITIONER

## 2024-02-12 PROCEDURE — 93005 ELECTROCARDIOGRAM TRACING: CPT

## 2024-02-12 PROCEDURE — 84484 ASSAY OF TROPONIN QUANT: CPT | Performed by: NURSE PRACTITIONER

## 2024-02-12 PROCEDURE — 85025 COMPLETE CBC W/AUTO DIFF WBC: CPT | Performed by: NURSE PRACTITIONER

## 2024-02-12 PROCEDURE — 99232 SBSQ HOSP IP/OBS MODERATE 35: CPT | Performed by: PSYCHIATRY & NEUROLOGY

## 2024-02-12 PROCEDURE — 82550 ASSAY OF CK (CPK): CPT | Performed by: NURSE PRACTITIONER

## 2024-02-12 PROCEDURE — 86140 C-REACTIVE PROTEIN: CPT | Performed by: NURSE PRACTITIONER

## 2024-02-12 RX ADMIN — POLYETHYLENE GLYCOL 3350 17 G: 17 POWDER, FOR SOLUTION ORAL at 08:20

## 2024-02-12 RX ADMIN — NICOTINE POLACRILEX 4 MG: 4 GUM, CHEWING ORAL at 13:11

## 2024-02-12 RX ADMIN — PROPRANOLOL HYDROCHLORIDE 10 MG: 10 TABLET ORAL at 21:14

## 2024-02-12 RX ADMIN — PROPRANOLOL HYDROCHLORIDE 10 MG: 10 TABLET ORAL at 08:38

## 2024-02-12 RX ADMIN — NICOTINE POLACRILEX 4 MG: 4 GUM, CHEWING ORAL at 18:31

## 2024-02-12 RX ADMIN — NICOTINE POLACRILEX 4 MG: 4 GUM, CHEWING ORAL at 21:14

## 2024-02-12 RX ADMIN — SENNOSIDES AND DOCUSATE SODIUM 2 TABLET: 8.6; 5 TABLET ORAL at 17:09

## 2024-02-12 RX ADMIN — MINOCYCLINE HYDROCHLORIDE 100 MG: 100 CAPSULE ORAL at 21:14

## 2024-02-12 RX ADMIN — ATROPINE SULFATE 1 DROP: 10 SOLUTION/ DROPS OPHTHALMIC at 21:13

## 2024-02-12 RX ADMIN — METFORMIN HYDROCHLORIDE 500 MG: 500 TABLET ORAL at 08:21

## 2024-02-12 RX ADMIN — ESCITALOPRAM OXALATE 20 MG: 10 TABLET ORAL at 08:21

## 2024-02-12 RX ADMIN — SENNOSIDES AND DOCUSATE SODIUM 2 TABLET: 8.6; 5 TABLET ORAL at 08:21

## 2024-02-12 RX ADMIN — MIRTAZAPINE 45 MG: 30 TABLET, FILM COATED ORAL at 21:14

## 2024-02-12 RX ADMIN — MELATONIN TAB 3 MG 3 MG: 3 TAB at 21:14

## 2024-02-12 RX ADMIN — CLOZAPINE 375 MG: 100 TABLET ORAL at 21:13

## 2024-02-12 RX ADMIN — MINOCYCLINE HYDROCHLORIDE 100 MG: 100 CAPSULE ORAL at 08:38

## 2024-02-12 RX ADMIN — NICOTINE POLACRILEX 4 MG: 4 GUM, CHEWING ORAL at 16:14

## 2024-02-12 NOTE — NURSING NOTE
Patient is in the community and social with peers. Patient makes need sknown and did not express any unmet needs. Patient endorses AH, but denies SI. Patient compliant with scheduled medications. Q7 observation maintained.

## 2024-02-12 NOTE — PROGRESS NOTES
Progress Note - Behavioral Health     Alberto Berumen 28 y.o. male MRN: [id number]   Unit/Bed#: [facility] 383-02 Encounter: [id number]    Documentation, nursing notes, medication reconciliation, labs, and vitals reviewed. Patient was seen for continuing care and reviewed with treatment team. No acute events over the past 24 hours.  Per nursing report, isolative to his room, calm and cooperative, does not always eat breakfast.  States he has gained too much weight while here.  Continues to endorse the same auditory hallucinations.  Medication changes over the past 24 hours: We continue to titrate up on his Clozaril.  Heart rate this morning is 75 continues to tolerate current medications with no adverse effects.     On evaluation today, he presents for same.  Improvement in his depression.  Some days he is more active on the unit and more animated, other days isolative and poorly motivated.  Reports no change in auditory hallucinations.  No suicidal ideation, plan, or intent. Ongoing perceptual disturbances and does not exhibit any symptoms of aimee on evaluation.    Psychiatric ROS:  Behavior over the last 24 hours: unchanged  Sleep: hypersomnia  Appetite: fair  Medication side effects: No   ROS: no complaints, all other systems are negative    Mental Status Evaluation:    Appearance:  marginal hygiene   Behavior:  guarded   Speech:  scant   Mood:  depressed   Affect:  blunted   Thought Process:  decreased rate of thoughts   Associations: concrete associations   Thought Content:  some paranoia   Perceptual Disturbances: auditory hallucinations   Risk Potential: Suicidal ideation - None  Homicidal ideation - None  Potential for aggression - No   Sensorium:  oriented to person, place, and time/date   Memory:  recent and remote memory grossly intact   Consciousness:  alert and awake   Attention/Concentration: decreased concentration and decreased attention span   Insight:  poor   Judgment: poor   Gait/Station: normal  gait/station, normal balance   Motor Activity: no abnormal movements     Vital signs in last 24 hours:    Temp:  [97.3 °F (36.3 °C)] 97.3 °F (36.3 °C)  HR:  [] 75  Resp:  [16-18] 16  BP: (126-158)/(72-97) 126/72    Laboratory results: I have personally reviewed all pertinent laboratory/tests results    Results from the past 24 hours:   Recent Results (from the past 24 hour(s))   CK    Collection Time: 02/12/24  6:34 AM   Result Value Ref Range    Total  39 - 308 U/L   C-reactive protein    Collection Time: 02/12/24  6:34 AM   Result Value Ref Range    CRP 6.6 (H) <3.0 mg/L   B-Type Natriuretic Peptide(BNP)    Collection Time: 02/12/24  6:34 AM   Result Value Ref Range    BNP 8 0 - 100 pg/mL   CBC and differential    Collection Time: 02/12/24  6:35 AM   Result Value Ref Range    WBC 8.09 4.31 - 10.16 Thousand/uL    RBC 5.01 3.88 - 5.62 Million/uL    Hemoglobin 11.8 (L) 12.0 - 17.0 g/dL    Hematocrit 40.3 36.5 - 49.3 %    MCV 80 (L) 82 - 98 fL    MCH 23.6 (L) 26.8 - 34.3 pg    MCHC 29.3 (L) 31.4 - 37.4 g/dL    RDW 13.2 11.6 - 15.1 %    MPV 10.5 8.9 - 12.7 fL    Platelets 227 149 - 390 Thousands/uL    nRBC 0 /100 WBCs    Neutrophils Relative 51 43 - 75 %    Immat GRANS % 0 0 - 2 %    Lymphocytes Relative 36 14 - 44 %    Monocytes Relative 9 4 - 12 %    Eosinophils Relative 3 0 - 6 %    Basophils Relative 1 0 - 1 %    Neutrophils Absolute 4.11 1.85 - 7.62 Thousands/µL    Immature Grans Absolute 0.03 0.00 - 0.20 Thousand/uL    Lymphocytes Absolute 2.87 0.60 - 4.47 Thousands/µL    Monocytes Absolute 0.75 0.17 - 1.22 Thousand/µL    Eosinophils Absolute 0.27 0.00 - 0.61 Thousand/µL    Basophils Absolute 0.06 0.00 - 0.10 Thousands/µL   High Sensitivity Troponin I Random    Collection Time: 02/12/24  6:35 AM   Result Value Ref Range    HS TnI random 3 (L) 8 - 18 ng/L       Suicide/Homicide Risk Assessment:    Risk of Harm to Self:   Current Specific Risk Factors include: presence of hallucinations  Protective  Factors: no current suicidal ideation, ability to communicate with staff on the unit, able to contract for safety on the unit, taking medications as ordered on the unit  Based on today's assessment, Alberto presents the following risk of harm to self: low    Risk of Harm to Others:  Current Specific Risk Factors include: current psychotic symptoms  Protective Factors: no current homicidal ideation, able to communicate with staff on the unit, psychotic symptoms are less prominent, compliant with medications on the unit as ordered  Based on today's assessment, Alberto presents the following risk of harm to others: minimal    The following interventions are recommended: behavioral checks every 7 minutes, continued hospitalization on locked unit    Progress Toward Goals: no significant improvement, poor motivation, continues to report psychotic symptoms    Assessment/Plan   Principal Problem:    Schizoaffective disorder, bipolar type ([facility])  Active Problems:    GERD (gastroesophageal reflux disease)    Tobacco abuse    T wave inversion in EKG    Chronic idiopathic constipation    Vitamin B 12 deficiency    Vitamin D deficiency    Acanthosis nigricans    Rash      Recommended Treatment:     Planned medication and treatment changes:    All current active medications have been reviewed  Encourage group therapy, milieu therapy and occupational therapy  Behavioral Health checks every 7 minutes  Increase Clozaril to 375 mg tonight with plan to increase to 400 mg tomorrow as tolerated and then recheck level end of week    Weekly EKG, CBC, BNP, CRP, CK, troponin every Monday for Clozaril monitoring    Continue all other medications the same    Current Facility-Administered Medications   Medication Dose Route Frequency Provider Last Rate    acetaminophen  650 mg Oral Q6H PRN Yasmin Harvey MD      acetaminophen  650 mg Oral Q4H PRN Yasmin Harvey MD      acetaminophen  975 mg Oral Q6H PRN Yasmin Harvey MD       aluminum-magnesium hydroxide-simethicone  30 mL Oral Q4H PRN Yasmin Harvey MD      atropine  1 drop Sublingual HS HOLLI Anderson      atropine  1 drop Sublingual Daily PRN HOLLI Anderson      haloperidol lactate  2.5 mg Intramuscular Q4H PRN Max 4/day Yasmin Harvey MD      And    LORazepam  1 mg Intramuscular Q4H PRN Max 4/day Yasmin Harvey MD      And    benztropine  0.5 mg Intramuscular Q4H PRN Max 4/day Yasmin Harvey MD      haloperidol lactate  5 mg Intramuscular Q4H PRN Max 4/day Yasmin Harvey MD      And    LORazepam  2 mg Intramuscular Q4H PRN Max 4/day Yasmin Harvey MD      And    benztropine  1 mg Intramuscular Q4H PRN Max 4/day Yasmin Harvey MD      benztropine  1 mg Oral Q4H PRN Max 6/day Yasmin Harvey MD      bisacodyl  10 mg Rectal Daily PRN Yasmin Harvey MD      cloZAPine  375 mg Oral HS HOLLI Anderson      hydrOXYzine HCL  50 mg Oral Q6H PRN Max 4/day Yasmin Harvey MD      Or    diphenhydrAMINE  50 mg Intramuscular Q6H PRN Yasmin Harvey MD      diphenhydrAMINE-zinc acetate   Topical BID PRN Anjel Sagastume MD      escitalopram  20 mg Oral Daily La Flores MD      haloperidol  1 mg Oral Q6H PRN Yasmin Harvey MD      haloperidol  2.5 mg Oral Q4H PRN Max 4/day Yasmin Harvey MD      haloperidol  5 mg Oral Q4H PRN Max 4/day Yasmin Harvey MD      hydrocortisone   Topical 4x Daily PRN HOLLI Monge      hydrOXYzine HCL  100 mg Oral Q6H PRN Max 4/day Yasmin Harvey MD      Or    LORazepam  2 mg Intramuscular Q6H PRN Yasmin Harvey MD      hydrOXYzine HCL  25 mg Oral Q6H PRN Max 4/day Yasmin Harvey MD      melatonin  3 mg Oral HS Yasmin Harvey MD      metFORMIN  500 mg Oral Daily With Breakfast HOLLI Monge      minocycline  100 mg Oral Q12H [facility] HOLLI Monge      mirtazapine  45 mg Oral HS La Flores MD      nicotine polacrilex  4 mg Oral Q2H PRN Yasmin Harvey MD       polyethylene glycol  17 g Oral Daily PRN Yasmin Harvey MD      polyethylene glycol  17 g Oral Daily HOLLI Monge      propranolol  10 mg Oral Q12H [facility] HOLLI Anderson      senna-docusate sodium  1 tablet Oral Daily PRN Yasmin Harvey MD      senna-docusate sodium  2 tablet Oral BID La Flores MD      traZODone  50 mg Oral HS PRN Yasmin Harvey MD      white petrolatum-mineral oil   Topical TID PRN Anjel Sagastume MD       Risks / Benefits of Treatment:    Risks, benefits, and possible side effects of medications explained to patient. Patient has limited understanding of risks and benefits of treatment at this time, but agrees to take medications as prescribed.    Counseling / Coordination of Care:    Patient's progress discussed with staff in treatment team meeting.  Medications, treatment progress and treatment plan reviewed with patient.    HOLLI Anderson 02/12/24

## 2024-02-12 NOTE — NURSING NOTE
Pt is isolative to room this morning, wanting to sleep in. Pt is calm and cooperative upon approach, medication compliant but did not eat breakfast this morning. Pt endorses the same auditory hallucinations, but denies SI, HI, and VH. Denies any needs at this time.

## 2024-02-13 LAB
ATRIAL RATE: 89 BPM
P AXIS: 37 DEGREES
PR INTERVAL: 152 MS
QRS AXIS: 42 DEGREES
QRSD INTERVAL: 94 MS
QT INTERVAL: 356 MS
QTC INTERVAL: 433 MS
T WAVE AXIS: -16 DEGREES
VENTRICULAR RATE: 89 BPM

## 2024-02-13 PROCEDURE — 93010 ELECTROCARDIOGRAM REPORT: CPT | Performed by: INTERNAL MEDICINE

## 2024-02-13 PROCEDURE — 99232 SBSQ HOSP IP/OBS MODERATE 35: CPT | Performed by: PSYCHIATRY & NEUROLOGY

## 2024-02-13 RX ORDER — CLOZAPINE 200 MG/1
400 TABLET ORAL
Status: DISCONTINUED | OUTPATIENT
Start: 2024-02-13 | End: 2024-02-23

## 2024-02-13 RX ADMIN — ATROPINE SULFATE 1 DROP: 10 SOLUTION/ DROPS OPHTHALMIC at 21:36

## 2024-02-13 RX ADMIN — NICOTINE POLACRILEX 4 MG: 4 GUM, CHEWING ORAL at 15:57

## 2024-02-13 RX ADMIN — POLYETHYLENE GLYCOL 3350 17 G: 17 POWDER, FOR SOLUTION ORAL at 08:22

## 2024-02-13 RX ADMIN — NICOTINE POLACRILEX 4 MG: 4 GUM, CHEWING ORAL at 12:58

## 2024-02-13 RX ADMIN — ESCITALOPRAM OXALATE 20 MG: 10 TABLET ORAL at 08:24

## 2024-02-13 RX ADMIN — SENNOSIDES AND DOCUSATE SODIUM 2 TABLET: 8.6; 5 TABLET ORAL at 17:02

## 2024-02-13 RX ADMIN — MELATONIN TAB 3 MG 3 MG: 3 TAB at 21:39

## 2024-02-13 RX ADMIN — METFORMIN HYDROCHLORIDE 500 MG: 500 TABLET ORAL at 08:24

## 2024-02-13 RX ADMIN — MIRTAZAPINE 45 MG: 30 TABLET, FILM COATED ORAL at 21:38

## 2024-02-13 RX ADMIN — CLOZAPINE 400 MG: 200 TABLET ORAL at 21:39

## 2024-02-13 RX ADMIN — SENNOSIDES AND DOCUSATE SODIUM 2 TABLET: 8.6; 5 TABLET ORAL at 08:24

## 2024-02-13 RX ADMIN — NICOTINE POLACRILEX 4 MG: 4 GUM, CHEWING ORAL at 21:20

## 2024-02-13 RX ADMIN — MINOCYCLINE HYDROCHLORIDE 100 MG: 100 CAPSULE ORAL at 21:38

## 2024-02-13 RX ADMIN — PROPRANOLOL HYDROCHLORIDE 10 MG: 10 TABLET ORAL at 08:24

## 2024-02-13 RX ADMIN — MINOCYCLINE HYDROCHLORIDE 100 MG: 100 CAPSULE ORAL at 08:24

## 2024-02-13 RX ADMIN — PROPRANOLOL HYDROCHLORIDE 10 MG: 10 TABLET ORAL at 21:48

## 2024-02-13 RX ADMIN — NICOTINE POLACRILEX 4 MG: 4 GUM, CHEWING ORAL at 17:55

## 2024-02-13 NOTE — PROGRESS NOTES
02/13/24 1512   Team Meeting   Meeting Type Daily Rounds   Team Members Present   Team Members Present Physician;Nurse;   Physician Team Member Mark   Nursing Team Member Tavon   Care Management Team Member Miguelito   Patient/Family Present   Patient Present No   Patient's Family Present No     Patient currently holds 201 status. Patient reports AH but denies all other symptoms. Patient is medication and meal compliant. Patient to continue on all current scheduled medications. Patient discharge date and time is to be determined.

## 2024-02-13 NOTE — NURSING NOTE
Pt visible, pleasant on approach, bright, social with peers. Denies si.hi.vh. continues to report AH. Continued q7m checks for safety.    Compliant with evening meds

## 2024-02-13 NOTE — PROGRESS NOTES
Progress Note - Behavioral Health     Alberto Berumen 28 y.o. male MRN: [id number]   Unit/Bed#: U 383-02 Encounter: [id number]    Documentation, nursing notes, medication reconciliation, labs, and vitals reviewed. Patient was seen for continuing care and reviewed with treatment team. No acute events over the past 24 hours.  Per nursing report, can be isolative to his room and napping off and on during the day.  Last evening pleasant and visible on approach bright and social with peers.  Continuing to report auditory hallucinations.    Medication changes over the past 24 hours: We continue to titrate up on his Clozaril.  We will obtain a level after he reaches 100 mg at bedtime. Continues to tolerate current medications with no adverse effects.     On evaluation today, he is seen in his room and again napping with covers over his head.  However he does set up for our interview.  Discussed with him the option for ECT for his treatment resistant symptoms, now that we have his [place] insurance sorted out.  He is willing to consider, and is agreeable to watching an ECT video.  Discussed with nursing staff and they will provide an opportunity for Alberto to watch the [employer] ECT educational video today.  He continues to report no improvement in those auditory hallucinations reddening him and his family.    No suicidal ideation, plan, or intent. Ongoing perceptual disturbances and does not exhibit any symptoms of aimee on evaluation.    Psychiatric ROS:  Behavior over the last 24 hours: improving  Sleep: slept off and on  Appetite: fair  Medication side effects: No   ROS: all other systems are negative    Mental Status Evaluation:    Appearance:  marginal hygiene   Behavior:  cooperative, guarded   Speech:  slow, soft   Mood:  depressed   Affect:  constricted   Thought Process:  decreased rate of thoughts   Associations: concrete associations   Thought Content:  Caodaism preoccupation, some paranoia   Perceptual  Disturbances: auditory hallucinations   Risk Potential: Suicidal ideation - None  Homicidal ideation - None  Potential for aggression - No   Sensorium:  oriented to person, place, and time/date   Memory:  recent and remote memory grossly intact   Consciousness:  alert and awake   Attention/Concentration: attention span and concentration appear shorter than expected for age   Insight:  limited   Judgment: limited   Gait/Station: normal gait/station, normal balance   Motor Activity: no abnormal movements     Vital signs in last 24 hours:    Temp:  [98.4 °F (36.9 °C)-98.5 °F (36.9 °C)] 98.5 °F (36.9 °C)  HR:  [89-99] 95  Resp:  [16-17] 16  BP: (138-141)/(90-99) 141/99    Laboratory results: I have personally reviewed all pertinent laboratory/tests results    Results from the past 24 hours:   Recent Results (from the past 24 hour(s))   ECG 12 lead    Collection Time: 02/12/24  6:33 PM   Result Value Ref Range    Ventricular Rate 89 BPM    Atrial Rate 89 BPM    SC Interval 152 ms    QRSD Interval 94 ms    QT Interval 356 ms    QTC Interval 433 ms    P Axis 37 degrees    QRS Axis 42 degrees    T Wave Axis -16 degrees       Suicide/Homicide Risk Assessment:    Risk of Harm to Self:   Current Specific Risk Factors include: current depressive symptoms  Protective Factors: no current suicidal ideation, ability to communicate with staff on the unit, able to contract for safety on the unit, taking medications as ordered on the unit  Based on today's assessment, Alberto presents the following risk of harm to self: minimal    Risk of Harm to Others:  Current Specific Risk Factors include: current psychotic symptoms  Protective Factors: no current homicidal ideation, able to communicate with staff on the unit, improved impulse control, mood is stabilizing  Based on today's assessment, Alberto presents the following risk of harm to others: minimal    The following interventions are recommended: behavioral checks every 7 minutes,  continued hospitalization on [facility] unit    Progress Toward Goals: no significant improvement    Assessment/Plan   Principal Problem:    Schizoaffective disorder, bipolar type ([facility])  Active Problems:    GERD (gastroesophageal reflux disease)    Tobacco abuse    T wave inversion in EKG    Chronic idiopathic constipation    Vitamin B 12 deficiency    Vitamin D deficiency    Acanthosis nigricans    Rash      Recommended Treatment:     Planned medication and treatment changes:    All current active medications have been reviewed  Encourage group therapy, milieu therapy and occupational therapy  Behavioral Health checks every 7 minutes  Increase Clozaril to 400 mg tonight  Check Clozaril level Friday evening    Provide patient with an opportunity to watch educational video on ECT    He continues for weekly monitoring of EKG, CBC, CK, CRP, BNP, troponin for Clozaril on Mondays    Current Facility-Administered Medications   Medication Dose Route Frequency Provider Last Rate    acetaminophen  650 mg Oral Q6H PRN Yasmin Harvey MD      acetaminophen  650 mg Oral Q4H PRN Yasmin Harvey MD      acetaminophen  975 mg Oral Q6H PRN Yasmin Harvey MD      aluminum-magnesium hydroxide-simethicone  30 mL Oral Q4H PRN Yasmin Harvey MD      atropine  1 drop Sublingual HS HOLLI Anderson      atropine  1 drop Sublingual Daily PRN HOLLI Anderson      haloperidol lactate  2.5 mg Intramuscular Q4H PRN Max 4/day Yasmin Harvey MD      And    LORazepam  1 mg Intramuscular Q4H PRN Max 4/day Yasmin Harvey MD      And    benztropine  0.5 mg Intramuscular Q4H PRN Max 4/day Yasmin Harvey MD      haloperidol lactate  5 mg Intramuscular Q4H PRN Max 4/day Yasmin Harvey MD      And    LORazepam  2 mg Intramuscular Q4H PRN Max 4/day Yasmin Harvey MD      And    benztropine  1 mg Intramuscular Q4H PRN Max 4/day Yasmin Harvey MD      benztropine  1 mg Oral Q4H PRN Max 6/day Yasmin Harvey MD      bisacodyl   10 mg Rectal Daily PRN Yasmin Harvey MD      cloZAPine  375 mg Oral HS HOLLI Anderson      hydrOXYzine HCL  50 mg Oral Q6H PRN Max 4/day Yasmin Harvey MD      Or    diphenhydrAMINE  50 mg Intramuscular Q6H PRN Yasmin Harvey MD      diphenhydrAMINE-zinc acetate   Topical BID PRN Anjel Sagastume MD      escitalopram  20 mg Oral Daily La Flores MD      haloperidol  1 mg Oral Q6H PRN Yasmin Harvey MD      haloperidol  2.5 mg Oral Q4H PRN Max 4/day Yasmin Harvey MD      haloperidol  5 mg Oral Q4H PRN Max 4/day Yasmin Harvey MD      hydrocortisone   Topical 4x Daily PRN HOLLI Monge      hydrOXYzine HCL  100 mg Oral Q6H PRN Max 4/day Yasmin Harvey MD      Or    LORazepam  2 mg Intramuscular Q6H PRN Yasmin Harvey MD      hydrOXYzine HCL  25 mg Oral Q6H PRN Max 4/day Yasmin Harvey MD      melatonin  3 mg Oral HS Yasmin Harvey MD      metFORMIN  500 mg Oral Daily With Breakfast HOLLI Monge      minocycline  100 mg Oral Q12H KAYLIE HOLLI Monge      mirtazapine  45 mg Oral HS La Flores MD      nicotine polacrilex  4 mg Oral Q2H PRN Yasmin Harvey MD      polyethylene glycol  17 g Oral Daily PRN Yasmin Harvey MD      polyethylene glycol  17 g Oral Daily HOLLI Monge      propranolol  10 mg Oral Q12H [facility] HOLLI Anderson      senna-docusate sodium  1 tablet Oral Daily PRN Yasmin Harvey MD      senna-docusate sodium  2 tablet Oral BID La Flores MD      traZODone  50 mg Oral HS PRN Yasmin Harvey MD      white petrolatum-mineral oil   Topical TID PRN Anjel Sagastume MD       Risks / Benefits of Treatment:    Risks, benefits, and possible side effects of medications explained to patient and patient verbalizes understanding and agreement for treatment.    Counseling / Coordination of Care:    Patient's progress discussed with staff in treatment team meeting.  Medications, treatment  progress and treatment plan reviewed with patient.    HOLLI Anderson 02/13/24

## 2024-02-13 NOTE — NURSING NOTE
Pt verbally denies SI, HI and A/V hallucinations. Compliant with meds. Wearing personal attire and resting in bed quietly at time of assessment. Proper judgement, insight, cognition. Social within milieu. No behavioral concerns. Follows all treatment goals and directions.

## 2024-02-14 PROCEDURE — 99232 SBSQ HOSP IP/OBS MODERATE 35: CPT | Performed by: PSYCHIATRY & NEUROLOGY

## 2024-02-14 RX ADMIN — SENNOSIDES AND DOCUSATE SODIUM 2 TABLET: 8.6; 5 TABLET ORAL at 17:49

## 2024-02-14 RX ADMIN — CLOZAPINE 400 MG: 200 TABLET ORAL at 21:21

## 2024-02-14 RX ADMIN — NICOTINE POLACRILEX 4 MG: 4 GUM, CHEWING ORAL at 15:56

## 2024-02-14 RX ADMIN — MIRTAZAPINE 45 MG: 30 TABLET, FILM COATED ORAL at 21:21

## 2024-02-14 RX ADMIN — PROPRANOLOL HYDROCHLORIDE 10 MG: 10 TABLET ORAL at 08:12

## 2024-02-14 RX ADMIN — NICOTINE POLACRILEX 4 MG: 4 GUM, CHEWING ORAL at 17:56

## 2024-02-14 RX ADMIN — MELATONIN TAB 3 MG 3 MG: 3 TAB at 21:21

## 2024-02-14 RX ADMIN — SENNOSIDES AND DOCUSATE SODIUM 2 TABLET: 8.6; 5 TABLET ORAL at 08:12

## 2024-02-14 RX ADMIN — NICOTINE POLACRILEX 4 MG: 4 GUM, CHEWING ORAL at 20:53

## 2024-02-14 RX ADMIN — MINOCYCLINE HYDROCHLORIDE 100 MG: 100 CAPSULE ORAL at 20:53

## 2024-02-14 RX ADMIN — PROPRANOLOL HYDROCHLORIDE 10 MG: 10 TABLET ORAL at 20:52

## 2024-02-14 RX ADMIN — ATROPINE SULFATE 1 DROP: 10 SOLUTION/ DROPS OPHTHALMIC at 21:21

## 2024-02-14 RX ADMIN — NICOTINE POLACRILEX 4 MG: 4 GUM, CHEWING ORAL at 13:24

## 2024-02-14 RX ADMIN — POLYETHYLENE GLYCOL 3350 17 G: 17 POWDER, FOR SOLUTION ORAL at 08:12

## 2024-02-14 RX ADMIN — METFORMIN HYDROCHLORIDE 500 MG: 500 TABLET ORAL at 08:12

## 2024-02-14 RX ADMIN — ESCITALOPRAM OXALATE 20 MG: 10 TABLET ORAL at 08:12

## 2024-02-14 RX ADMIN — MINOCYCLINE HYDROCHLORIDE 100 MG: 100 CAPSULE ORAL at 08:12

## 2024-02-14 NOTE — NURSING NOTE
Pt reviewed ECT video. Pt did not have any questions but verbalized an understanding of the procedure.

## 2024-02-14 NOTE — NURSING NOTE
Pt observed in milieu and dayroom. He is sleeping more during the day, but alert and oriented when awake. Pt is wearing his own clothing. Medication and meal compliant. Pt denies SI/HI/VH, but continues with AH with slight improvement. No unmet needs this shift.

## 2024-02-14 NOTE — PLAN OF CARE
Problem: ANXIETY  Goal: Will report anxiety at manageable levels  Description: INTERVENTIONS:  - Administer medication as ordered  - Teach and encourage coping skills  - Provide emotional support  - Assess patient/family for anxiety and ability to cope  Outcome: Progressing     Problem: Ineffective Coping  Goal: Participates in unit activities  Description: Interventions:  - Provide therapeutic environment   - Provide required programming   - Redirect inappropriate behaviors   Outcome: Progressing     Problem: Depression  Goal: Treatment Goal: Demonstrate behavioral control of depressive symptoms, verbalize feelings of improved mood/affect, and adopt new coping skills prior to discharge  Outcome: Progressing  Goal: Verbalize thoughts and feelings  Description: Interventions:  - Assess and re-assess patient's level of risk   - Engage patient in 1:1 interactions, daily, for a minimum of 15 minutes   - Encourage patient to express feelings, fears, frustrations, hopes   Outcome: Progressing  Goal: Refrain from isolation  Description: Interventions:  - Develop a trusting relationship   - Encourage socialization   Outcome: Progressing  Goal: Refrain from self-neglect  Outcome: Progressing     Problem: DISCHARGE PLANNING  Goal: Discharge to home or other facility with appropriate resources  Description: INTERVENTIONS:  - Identify barriers to discharge w/patient and caregiver  - Arrange for needed discharge resources and transportation as appropriate  - Identify discharge learning needs (meds, wound care, etc.)  - Arrange for interpretive services to assist at discharge as needed  - Refer to Case Management Department for coordinating discharge planning if the patient needs post-hospital services based on physician/advanced practitioner order or complex needs related to functional status, cognitive ability, or social support system  Outcome: Progressing     Problem: PSYCHOSIS  Goal: Will report no hallucinations or  delusions  Description: Interventions:  - Administer medication as  ordered  - Every waking shifts and PRN assess for the presence of hallucinations and or delusions  - Assist with reality testing to support increasing orientation  - Assess if patient's hallucinations or delusions are encouraging self-harm or harm to others and intervene as appropriate  Outcome: Not Progressing

## 2024-02-14 NOTE — PROGRESS NOTES
"Progress Note - Behavioral Health     Alberto Berumen 28 y.o. male MRN: [id number]   Unit/Bed#: [facility] 383-02 Encounter: [id number]    Documentation, nursing notes, medication reconciliation, labs, and vitals reviewed. Patient was seen for continuing care and reviewed with treatment team. No acute events over the past 24 hours.  Per nursing report, noted to be sleeping more during the day, but alert and oriented when awake.  More social in the evening.  Continues with auditory hallucinations but with slight improvement.  Medication changes over the past 24 hours. Continues to tolerate current medications with no adverse effects.     On evaluation today, he is seen in his room and resting in bed.  He does acknowledge some decrease in auditory hallucinations.  States he continues to have paranoia similar to the content of his auditory hallucinations \"that he and his family will be killed\".  I did spend some time talking about the risks and benefits of ECT.  He will watch the video today and is considering.  No suicidal ideation, plan, or intent.  Slight decrease of perceptual disturbances and does not exhibit any symptoms of aimee on evaluation.    Psychiatric ROS:  Behavior over the last 24 hours: unchanged  Sleep: slept off and on  Appetite: fair  Medication side effects: No   ROS: no complaints, all other systems are negative    Mental Status Evaluation:    Appearance:  marginal hygiene   Behavior:  cooperative, guarded   Speech:  normal rate, normal volume   Mood:  depressed   Affect:  constricted   Thought Process:  decreased rate of thoughts   Associations: concrete associations   Thought Content:  some paranoia   Perceptual Disturbances: auditory hallucinations   Risk Potential: Suicidal ideation - None  Homicidal ideation - None  Potential for aggression - No   Sensorium:  oriented to person, place, and time/date   Memory:  recent and remote memory grossly intact   Consciousness:  alert and awake "   Attention/Concentration: decreased concentration and decreased attention span   Insight:  limited   Judgment: limited   Gait/Station: normal gait/station, normal balance   Motor Activity: no abnormal movements     Vital signs in last 24 hours:    Temp:  [97.6 °F (36.4 °C)-97.8 °F (36.6 °C)] 97.6 °F (36.4 °C)  HR:  [] 101  Resp:  [16-18] 18  BP: (129-147)/(77-83) 129/77    Laboratory results: I have personally reviewed all pertinent laboratory/tests results    Results from the past 24 hours: No results found for this or any previous visit (from the past 24 hour(s)).    Suicide/Homicide Risk Assessment:    Risk of Harm to Self:   Current Specific Risk Factors include: current depressive symptoms, current psychotic symptoms  Protective Factors: no current suicidal ideation, ability to communicate with staff on the unit, able to contract for safety on the unit, taking medications as ordered on the unit  Based on today's assessment, Alberto presents the following risk of harm to self: minimal    Risk of Harm to Others:  Current Specific Risk Factors include: current psychotic symptoms  Protective Factors: no current homicidal ideation, impulse control is improving  Based on today's assessment, Alberto presents the following risk of harm to others: minimal    The following interventions are recommended: behavioral checks every 7 minutes, continued hospitalization on locked unit    Progress Toward Goals: minimal improvement, mood is stabilizing, remains paranoid    Assessment/Plan   Principal Problem:    Schizoaffective disorder, bipolar type ([facility])  Active Problems:    GERD (gastroesophageal reflux disease)    Tobacco abuse    T wave inversion in EKG    Chronic idiopathic constipation    Vitamin B 12 deficiency    Vitamin D deficiency    Acanthosis nigricans    Rash      Recommended Treatment:     Planned medication and treatment changes:    All current active medications have been reviewed  Encourage group  therapy, milieu therapy and occupational therapy  Behavioral Health checks every 7 minutes  Check Clozaril level Friday evening  Monitor weekly CBC, CK, CRP, troponin, BNP, EKG every Monday for Clozaril monitoring    Continue all other medications the same    Nursing to share with patient educational video on ECT    Current Facility-Administered Medications   Medication Dose Route Frequency Provider Last Rate    acetaminophen  650 mg Oral Q6H PRN Yasmin Harvey MD      acetaminophen  650 mg Oral Q4H PRN Yasmin Harvey MD      acetaminophen  975 mg Oral Q6H PRN Yasmin Harvey MD      aluminum-magnesium hydroxide-simethicone  30 mL Oral Q4H PRN Yasmin Harvey MD      atropine  1 drop Sublingual HS HOLLI Anderson      atropine  1 drop Sublingual Daily PRN HOLLI Anderson      haloperidol lactate  2.5 mg Intramuscular Q4H PRN Max 4/day Yasmin Harvey MD      And    LORazepam  1 mg Intramuscular Q4H PRN Max 4/day Yasmin Harvey MD      And    benztropine  0.5 mg Intramuscular Q4H PRN Max 4/day Yasmin Harvey MD      haloperidol lactate  5 mg Intramuscular Q4H PRN Max 4/day Yasmin Harvey MD      And    LORazepam  2 mg Intramuscular Q4H PRN Max 4/day Yasmin Harvey MD      And    benztropine  1 mg Intramuscular Q4H PRN Max 4/day Yasmin Harvey MD      benztropine  1 mg Oral Q4H PRN Max 6/day Yasmin Harvey MD      bisacodyl  10 mg Rectal Daily PRN Yasmin Harvey MD      cloZAPine  400 mg Oral HS HOLLI Anderson      hydrOXYzine HCL  50 mg Oral Q6H PRN Max 4/day Yasmin Harvey MD      Or    diphenhydrAMINE  50 mg Intramuscular Q6H PRN Yasmin Harvey MD      diphenhydrAMINE-zinc acetate   Topical BID PRN Anjel Sagastume MD      escitalopram  20 mg Oral Daily La Flores MD      haloperidol  1 mg Oral Q6H PRN Yasmin Harvey MD      haloperidol  2.5 mg Oral Q4H PRN Max 4/day Yasmin Harvey MD      haloperidol  5 mg Oral Q4H PRN Max 4/day Yasmin Harvey MD       hydrocortisone   Topical 4x Daily PRN HOLLI Monge      hydrOXYzine HCL  100 mg Oral Q6H PRN Max 4/day Yasmin Harvey MD      Or    LORazepam  2 mg Intramuscular Q6H PRN Yasmin Harvey MD      hydrOXYzine HCL  25 mg Oral Q6H PRN Max 4/day Yasmin Harvey MD      melatonin  3 mg Oral HS Yasmin Harvey MD      metFORMIN  500 mg Oral Daily With Breakfast HOLLI Monge      minocycline  100 mg Oral Q12H KAYLIE HOLLI Monge      mirtazapine  45 mg Oral HS La Flores MD      nicotine polacrilex  4 mg Oral Q2H PRN Yasmin Harvey MD      polyethylene glycol  17 g Oral Daily PRN Yasmin Harvey MD      polyethylene glycol  17 g Oral Daily HOLLI Monge      propranolol  10 mg Oral Q12H [facility] HOLLI Anderson      senna-docusate sodium  1 tablet Oral Daily PRN Yasmin Harvey MD      senna-docusate sodium  2 tablet Oral BID La Flores MD      traZODone  50 mg Oral HS PRN Yasmin Harvey MD      white petrolatum-mineral oil   Topical TID PRN Anjel Sagastume MD       Risks / Benefits of Treatment:    Risks, benefits, and possible side effects of medications explained to patient and patient verbalizes understanding and agreement for treatment.    Counseling / Coordination of Care:    Patient's progress discussed with staff in treatment team meeting.  Medications, treatment progress and treatment plan reviewed with patient.    HOLLI Anderson 02/14/24

## 2024-02-14 NOTE — NURSING NOTE
Patient pleasant on approach, denies all psychiatric symptoms. Visible on unit watching television socializing with peers. Patient is compliant with medication and unit routine.

## 2024-02-14 NOTE — PLAN OF CARE
Problem: Ineffective Coping  Goal: Participates in unit activities  Description: Interventions:  - Provide therapeutic environment   - Provide required programming   - Redirect inappropriate behaviors   Outcome: Not Progressing     On the average, it appears patient attends one unit group/activity a day or less over the past several weeks.

## 2024-02-15 PROCEDURE — 99232 SBSQ HOSP IP/OBS MODERATE 35: CPT | Performed by: PSYCHIATRY & NEUROLOGY

## 2024-02-15 RX ADMIN — MELATONIN TAB 3 MG 3 MG: 3 TAB at 21:25

## 2024-02-15 RX ADMIN — ESCITALOPRAM OXALATE 20 MG: 10 TABLET ORAL at 08:29

## 2024-02-15 RX ADMIN — METFORMIN HYDROCHLORIDE 500 MG: 500 TABLET ORAL at 08:29

## 2024-02-15 RX ADMIN — PROPRANOLOL HYDROCHLORIDE 10 MG: 10 TABLET ORAL at 08:29

## 2024-02-15 RX ADMIN — CLOZAPINE 400 MG: 200 TABLET ORAL at 21:25

## 2024-02-15 RX ADMIN — ATROPINE SULFATE 1 DROP: 10 SOLUTION/ DROPS OPHTHALMIC at 21:24

## 2024-02-15 RX ADMIN — NICOTINE POLACRILEX 4 MG: 4 GUM, CHEWING ORAL at 16:06

## 2024-02-15 RX ADMIN — NICOTINE POLACRILEX 4 MG: 4 GUM, CHEWING ORAL at 18:11

## 2024-02-15 RX ADMIN — NICOTINE POLACRILEX 4 MG: 4 GUM, CHEWING ORAL at 21:25

## 2024-02-15 RX ADMIN — MINOCYCLINE HYDROCHLORIDE 100 MG: 100 CAPSULE ORAL at 21:25

## 2024-02-15 RX ADMIN — MIRTAZAPINE 45 MG: 30 TABLET, FILM COATED ORAL at 21:24

## 2024-02-15 RX ADMIN — SENNOSIDES AND DOCUSATE SODIUM 2 TABLET: 8.6; 5 TABLET ORAL at 17:36

## 2024-02-15 RX ADMIN — NICOTINE POLACRILEX 4 MG: 4 GUM, CHEWING ORAL at 08:29

## 2024-02-15 RX ADMIN — PROPRANOLOL HYDROCHLORIDE 10 MG: 10 TABLET ORAL at 21:25

## 2024-02-15 RX ADMIN — SENNOSIDES AND DOCUSATE SODIUM 2 TABLET: 8.6; 5 TABLET ORAL at 08:28

## 2024-02-15 RX ADMIN — POLYETHYLENE GLYCOL 3350 17 G: 17 POWDER, FOR SOLUTION ORAL at 08:28

## 2024-02-15 RX ADMIN — NICOTINE POLACRILEX 4 MG: 4 GUM, CHEWING ORAL at 13:14

## 2024-02-15 RX ADMIN — MINOCYCLINE HYDROCHLORIDE 100 MG: 100 CAPSULE ORAL at 08:38

## 2024-02-15 NOTE — TREATMENT PLAN
TREATMENT PLAN REVIEW - Behavioral Health Alberto Berumen 28 y.o. 1995 male MRN: [id number]    [facility] 3P BEHAVIORAL HLTH Room / Bed: [facility] 383/[facility] 383-02 Encounter: [id number]        Admit Date/Time:  11/14/2023 10:44 PM    Treatment Team:   MD Екатерина Fontaine MD Hailey Farrell, RN  DO Breanna Villagomez [facility]  Ashu MIGUEL Novak, MIGUEL Jose    Diagnosis: Principal Problem:    Schizoaffective disorder, bipolar type ([facility])  Active Problems:    GERD (gastroesophageal reflux disease)    Tobacco abuse    T wave inversion in EKG    Chronic idiopathic constipation    Vitamin B 12 deficiency    Vitamin D deficiency    Acanthosis nigricans    Rash      Patient Strengths/Assets: negotiates basic needs, patient is on a voluntary commitment      Patient Barriers/Limitations: chronic mental illness, difficulty adapting, poor self-care    Short Term Goals: decrease in depressive symptoms, decrease in psychotic symptoms, tolerate medications    Long Term Goals: resolution of psychotic symptoms, improved reality testing, improved reasoning ability, improved insight    Progress Towards Goals: continue psychiatric medications as prescribed, consider  ECT    Recommended Treatment: medication management, patient medication education, group therapy, milieu therapy, continued Behavioral Health psychiatric evaluation/assessment process     Treatment Frequency: daily medication monitoring, group and milieu therapy daily, monitoring through interdisciplinary rounds, monitoring through weekly patient care conferences    Expected Discharge Date: 30 days - 3/16/2024    Discharge Plan: referral for outpatient medication management with a psychiatrist, referral for outpatient psychotherapy. EAC may need to be considered if symptoms don't improve    Treatment Plan Created/Updated By: La  MD Mark

## 2024-02-15 NOTE — NURSING NOTE
"Patient is calm and cooperative upon approach. Patient isolative to room this morning, resting in bed. Patient reported AH of voices saying, \"Their going to murder my family and me.\" Patient remains in behavioral control. Patient is compliant with meds and meals.   "

## 2024-02-15 NOTE — NURSING NOTE
Patient calm and pleasant on approach, denies SI/HI/VH, states experiencing auditory hallucinations but able to determine voices from actually individuals. Patient visible walking and socializing with peers on unit, compliant with medication and unit routine.

## 2024-02-15 NOTE — PROGRESS NOTES
"Progress Note - Behavioral Health     Alberto Berumen 28 y.o. male MRN: [id number]   Unit/Bed#: -02 Encounter: [id number]    Behavior over the last 24 hours: unchanged.     Alberto 28 year old male seen today in follow-up. As per nursing, he had no issues overnight. Upon interview, he is lying in bed and appears flat. He denies SI, HI, VH. He reports he is still having auditory hallucinations but they are \"not as bad.\" When asked if these are the same hallucinations that he has been experiencing about his family, he responds, \"yes.\" He viewed the ECT video yesterday. Risks/benefits re-discussed in detail. He states \"I am not for it, but not against it\" and needs more time to think about it. Otherwise, he offers no other complaints.     Sleep: normal  Appetite: normal  Medication side effects: No   ROS: all other systems are negative    Mental Status Evaluation:    Appearance:  age appropriate, marginal hygiene, lying in bed with the blanket over him   Behavior:  cooperative, calm, guarded   Speech:  normal rate and volume, clear   Mood:  depressed   Affect:  constricted, mood-congruent   Thought Process:  decreased rate of thoughts   Associations: concrete associations   Thought Content:  some paranoia   Perceptual Disturbances: auditory hallucinations   Risk Potential: Suicidal ideation - None  Homicidal ideation - None  Potential for aggression - No   Sensorium:  oriented to person, place, and time/date   Memory:  recent and remote memory grossly intact   Consciousness:  alert and awake   Attention/Concentration: decreased concentration and decreased attention span   Insight:  limited   Judgment: limited   Gait/Station: normal gait/station   Motor Activity: no abnormal movements     Vital signs in last 24 hours:    Temp:  [97 °F (36.1 °C)-98.3 °F (36.8 °C)] 98.3 °F (36.8 °C)  HR:  [] 92  Resp:  [16] 16  BP: (130-141)/() 130/80    Laboratory results: I have personally reviewed all pertinent " laboratory/tests results    Results from the past 24 hours: No results found for this or any previous visit (from the past 24 hour(s)).    Progress Toward Goals: minimal improvement    Assessment/Plan   Principal Problem:    Schizoaffective disorder, bipolar type (HCC)  Active Problems:    GERD (gastroesophageal reflux disease)    Tobacco abuse    T wave inversion in EKG    Chronic idiopathic constipation    Vitamin B 12 deficiency    Vitamin D deficiency    Acanthosis nigricans    Rash      Recommended Treatment:     Planned medication and treatment changes:    All current active medications have been reviewed  Encourage group therapy, milieu therapy and occupational therapy  Behavioral Health checks every 7 minutes    Labs reviewed   Clozaril level to be checked Friday 2/16 evening.   Weekly CBC, CK, CRP, Troponin, BNP, EKG to completed Monday 2/19 for Clozaril monitoring   Continue all current medications  ECT treatment may be implemented in the future pending the patient's decision     Current Facility-Administered Medications   Medication Dose Route Frequency Provider Last Rate    acetaminophen  650 mg Oral Q6H PRN Yasmin Harvey MD      acetaminophen  650 mg Oral Q4H PRN Yasmin Harvey MD      acetaminophen  975 mg Oral Q6H PRN Yasmin Harvey MD      aluminum-magnesium hydroxide-simethicone  30 mL Oral Q4H PRN Yasmin Harvey MD      atropine  1 drop Sublingual HS HOLLI Anderson      atropine  1 drop Sublingual Daily PRN HOLLI Anderson      haloperidol lactate  2.5 mg Intramuscular Q4H PRN Max 4/day Yasmin Harvey MD      And    LORazepam  1 mg Intramuscular Q4H PRN Max 4/day Yasmin Harvey MD      And    benztropine  0.5 mg Intramuscular Q4H PRN Max 4/day Yasmin Harvey MD      haloperidol lactate  5 mg Intramuscular Q4H PRN Max 4/day Yasmin Harvey MD      And    LORazepam  2 mg Intramuscular Q4H PRN Max 4/day Yasmin Harvey MD      And    benztropine  1 mg Intramuscular Q4H  PRN Max 4/day Yasmin Harvey MD      benztropine  1 mg Oral Q4H PRN Max 6/day Yasmin Harvey MD      bisacodyl  10 mg Rectal Daily PRN Yasmin Harvey MD      cloZAPine  400 mg Oral HS Prisca Villafuerte, HOLLI      hydrOXYzine HCL  50 mg Oral Q6H PRN Max 4/day Yasmin Harvey MD      Or    diphenhydrAMINE  50 mg Intramuscular Q6H PRN Yasmin Harvey MD      diphenhydrAMINE-zinc acetate   Topical BID PRN Anjel Sagastume MD      escitalopram  20 mg Oral Daily La Flores MD      haloperidol  1 mg Oral Q6H PRN Yasmin Harvey MD      haloperidol  2.5 mg Oral Q4H PRN Max 4/day Yasmin Harvey MD      haloperidol  5 mg Oral Q4H PRN Max 4/day Yasmin Harvey MD      hydrocortisone   Topical 4x Daily PRN HOLLI Monge      hydrOXYzine HCL  100 mg Oral Q6H PRN Max 4/day Yasmin Harvey MD      Or    LORazepam  2 mg Intramuscular Q6H PRN Yasmin Harvey MD      hydrOXYzine HCL  25 mg Oral Q6H PRN Max 4/day Yasmin Harvey MD      melatonin  3 mg Oral HS Yasmin Harvey MD      metFORMIN  500 mg Oral Daily With Breakfast Eileen Jensen, HOLLI      minocycline  100 mg Oral Q12H KAYLIE HOLLI Monge      mirtazapine  45 mg Oral HS La Flores MD      nicotine polacrilex  4 mg Oral Q2H PRN Yasmin Harvey MD      polyethylene glycol  17 g Oral Daily PRN Yasmin Harvey MD      polyethylene glycol  17 g Oral Daily HOLIL Monge      propranolol  10 mg Oral Q12H KAYLIE HOLLI Anderson      senna-docusate sodium  1 tablet Oral Daily PRN Yasmin Harvey MD      senna-docusate sodium  2 tablet Oral BID La Flores MD      traZODone  50 mg Oral HS PRN Yasmin Harvey MD      white petrolatum-mineral oil   Topical TID PRN Anjel Sagastume MD       Risks / Benefits of Treatment:    Risks, benefits, and possible side effects of medications explained to patient and patient verbalizes understanding and agreement for treatment.    Counseling /  Coordination of Care:    Patient's progress discussed with staff in treatment team meeting.  Medications, treatment progress and treatment plan reviewed with patient.    Verónica Jo PA-C 02/15/24

## 2024-02-15 NOTE — PROGRESS NOTES
02/15/24 0957   Team Meeting   Meeting Type Daily Rounds   Team Members Present   Team Members Present Physician;Nurse;;Other (Discipline and Name)   Physician Team Member Mark   Nursing Team Member Sera   Care Management Team Member Ladan LOZA   Other (Discipline and Name) Zeb Parish Pharm D, Marco (therapist)   Patient/Family Present   Patient Present No   Patient's Family Present No   OTHER   Team Meeting - Additional Comments Increased daytime sleep.  Denies SI/HI/VH, states experiencing auditory hallucinations but able to determine voices from actually individuals. Patient visible walking and socializing with peers on unit, compliant with medication and unit routine.   Considering ECT due to limited response to clozaril.

## 2024-02-16 LAB — CLOZAPINE SERPL-MCNC: 492 NG/ML (ref 350–900)

## 2024-02-16 PROCEDURE — 80159 DRUG ASSAY CLOZAPINE: CPT | Performed by: NURSE PRACTITIONER

## 2024-02-16 PROCEDURE — 99232 SBSQ HOSP IP/OBS MODERATE 35: CPT | Performed by: PSYCHIATRY & NEUROLOGY

## 2024-02-16 RX ADMIN — ESCITALOPRAM OXALATE 20 MG: 10 TABLET ORAL at 08:27

## 2024-02-16 RX ADMIN — SENNOSIDES AND DOCUSATE SODIUM 2 TABLET: 8.6; 5 TABLET ORAL at 17:27

## 2024-02-16 RX ADMIN — NICOTINE POLACRILEX 4 MG: 4 GUM, CHEWING ORAL at 13:21

## 2024-02-16 RX ADMIN — METFORMIN HYDROCHLORIDE 500 MG: 500 TABLET ORAL at 08:27

## 2024-02-16 RX ADMIN — PROPRANOLOL HYDROCHLORIDE 10 MG: 10 TABLET ORAL at 21:06

## 2024-02-16 RX ADMIN — NICOTINE POLACRILEX 4 MG: 4 GUM, CHEWING ORAL at 08:27

## 2024-02-16 RX ADMIN — NICOTINE POLACRILEX 4 MG: 4 GUM, CHEWING ORAL at 15:52

## 2024-02-16 RX ADMIN — MELATONIN TAB 3 MG 3 MG: 3 TAB at 21:06

## 2024-02-16 RX ADMIN — MIRTAZAPINE 45 MG: 30 TABLET, FILM COATED ORAL at 21:06

## 2024-02-16 RX ADMIN — NICOTINE POLACRILEX 4 MG: 4 GUM, CHEWING ORAL at 20:45

## 2024-02-16 RX ADMIN — SENNOSIDES AND DOCUSATE SODIUM 2 TABLET: 8.6; 5 TABLET ORAL at 08:27

## 2024-02-16 RX ADMIN — NICOTINE POLACRILEX 4 MG: 4 GUM, CHEWING ORAL at 18:03

## 2024-02-16 RX ADMIN — ATROPINE SULFATE 1 DROP: 10 SOLUTION/ DROPS OPHTHALMIC at 21:07

## 2024-02-16 RX ADMIN — MINOCYCLINE HYDROCHLORIDE 100 MG: 100 CAPSULE ORAL at 21:06

## 2024-02-16 RX ADMIN — POLYETHYLENE GLYCOL 3350 17 G: 17 POWDER, FOR SOLUTION ORAL at 08:27

## 2024-02-16 RX ADMIN — PROPRANOLOL HYDROCHLORIDE 10 MG: 10 TABLET ORAL at 08:27

## 2024-02-16 RX ADMIN — CLOZAPINE 400 MG: 200 TABLET ORAL at 21:06

## 2024-02-16 RX ADMIN — MINOCYCLINE HYDROCHLORIDE 100 MG: 100 CAPSULE ORAL at 08:28

## 2024-02-16 NOTE — PROGRESS NOTES
02/16/24 0841   Team Meeting   Meeting Type Daily Rounds   Team Members Present   Team Members Present Physician;Nurse;;Other (Discipline and Name)   Physician Team Member Mark   Nursing Team Member Earl   Care Management Team Member Rabia   Other (Discipline and Name) Damon   Patient/Family Present   Patient Present No   Patient's Family Present No     Pt watched ECT video and still contemplating whether to pursue ECT or not. Continue Clozapine, lvl tonight. Explored EAC options. Continue to monitor.

## 2024-02-16 NOTE — NURSING NOTE
"Patient was visible out of their room most of the evening. Social with peers. Calm and cooperative. Reports auditory hallucinations from both ears that \"they are going to kill me and my family\". Reassurance provided. Denies depression, anxiety, SI, or HI.     Made aware of scheduled lab work tomorrow evening. Medication compliant. Retreated to bed on their own without any issues.   "

## 2024-02-16 NOTE — NURSING NOTE
"Patient is calm and cooperative upon approach. Patient continues to report AH of voices saying, \"Going to kill family and self.\" Patient is isolative to room this morning. Patient is compliant with meds and meals.   "

## 2024-02-16 NOTE — PROGRESS NOTES
"Progress Note - Behavioral Health     Alberto Berumen 28 y.o. male MRN: [id number]   Unit/Bed#: [facility] 383-02 Encounter: [id number]    Documentation, nursing notes, medication reconciliation, labs, and vitals reviewed. Patient was seen for continuing care and reviewed with treatment team. No acute events over the past 24 hours.  Per nursing report, patient is isolative to his room this morning, calm and cooperative but complains of ongoing auditory hallucinations, voices saying \"going to kill my family and self \".    No medication changes over the past 24 hours. Continues to tolerate current medications with no adverse effects.     On evaluation today, he continues to have difficulty deciding regarding ECT.  States he is aware of another patient on the unit having had ECT since he has been here.  States he saw the patient get better, but also thought the treatment was \"traumatic \".  Otherwise continues to report slight improvement in depressed mood but overall minimal improvement in auditory hallucinations threatening him and his family.    No suicidal ideation, plan, or intent. Ongoing perceptual disturbances and does not exhibit any symptoms of aimee on evaluation.    Psychiatric ROS:  Behavior over the last 24 hours: unchanged  Sleep: hypersomnia  Appetite: fair  Medication side effects: No   ROS: no complaints, all other systems are negative    Mental Status Evaluation:    Appearance:  marginal hygiene   Behavior:  cooperative, guarded   Speech:  slow, soft   Mood:  depressed   Affect:  blunted   Thought Process:  circumstantial   Associations: concrete associations   Thought Content:  some paranoia   Perceptual Disturbances: auditory hallucinations   Risk Potential: Suicidal ideation - None  Homicidal ideation - None  Potential for aggression - No   Sensorium:  oriented to person, place, and time/date   Memory:  recent and remote memory grossly intact   Consciousness:  alert and awake   Attention/Concentration: " decreased concentration and decreased attention span   Insight:  poor   Judgment: poor   Gait/Station: normal gait/station, normal balance   Motor Activity: no abnormal movements     Vital signs in last 24 hours:    Temp:  [97.9 °F (36.6 °C)-98.6 °F (37 °C)] 98.6 °F (37 °C)  HR:  [] 94  Resp:  [16-18] 18  BP: (114-160)/(80-92) 145/92    Laboratory results: I have personally reviewed all pertinent laboratory/tests results    Results from the past 24 hours: No results found for this or any previous visit (from the past 24 hour(s)).    Suicide/Homicide Risk Assessment:    Risk of Harm to Self:   Current Specific Risk Factors include: current depressive symptoms, current psychotic symptoms  Protective Factors: no current suicidal ideation, ability to communicate with staff on the unit, able to contract for safety on the unit, taking medications as ordered on the unit  Based on today's assessment, Alberto presents the following risk of harm to self: low    Risk of Harm to Others:  Current Specific Risk Factors include: current psychotic symptoms  Protective Factors: no current homicidal ideation, impulse control is improving, mood is stabilizing  Based on today's assessment, Alberto presents the following risk of harm to others: minimal    The following interventions are recommended: behavioral checks every 7 minutes, continued hospitalization on locked unit    Progress Toward Goals: limited improvement, mood is stabilizing, still psychotic    Assessment/Plan   Principal Problem:    Schizoaffective disorder, bipolar type (HCC)  Active Problems:    GERD (gastroesophageal reflux disease)    Tobacco abuse    T wave inversion in EKG    Chronic idiopathic constipation    Vitamin B 12 deficiency    Vitamin D deficiency    Acanthosis nigricans    Rash      Recommended Treatment:     Planned medication and treatment changes:    All current active medications have been reviewed  Encourage group therapy, milieu therapy and  occupational therapy  Behavioral Health checks every 7 minutes    Extended Acute Care referral    He is not decided yet on starting ECT - wants to talk to his sister    Recheck clozapine level this evening    Monitor CBC/diff, C-reactive protein, BNP, troponin, and ECG weekly on Monday    Continue current medications.      Current Facility-Administered Medications   Medication Dose Route Frequency Provider Last Rate    acetaminophen  650 mg Oral Q6H PRN Yasmin Harvey MD      acetaminophen  650 mg Oral Q4H PRN Yasmin Harvey MD      acetaminophen  975 mg Oral Q6H PRN Yasmin Harvey MD      aluminum-magnesium hydroxide-simethicone  30 mL Oral Q4H PRN Yasmin Harvey MD      atropine  1 drop Sublingual HS HOLLI Anderson      atropine  1 drop Sublingual Daily PRN HOLLI Anderson      haloperidol lactate  2.5 mg Intramuscular Q4H PRN Max 4/day Yasmin Harvey MD      And    LORazepam  1 mg Intramuscular Q4H PRN Max 4/day Yasmin Harvey MD      And    benztropine  0.5 mg Intramuscular Q4H PRN Max 4/day Yasmin Harvey MD      haloperidol lactate  5 mg Intramuscular Q4H PRN Max 4/day Yasmin Harvey MD      And    LORazepam  2 mg Intramuscular Q4H PRN Max 4/day Yasmin Harvey MD      And    benztropine  1 mg Intramuscular Q4H PRN Max 4/day Yasmin Harvey MD      benztropine  1 mg Oral Q4H PRN Max 6/day Yasmin Harvey MD      bisacodyl  10 mg Rectal Daily PRN Yasmin Harvey MD      cloZAPine  400 mg Oral HS HOLLI Anderson      hydrOXYzine HCL  50 mg Oral Q6H PRN Max 4/day Yasmin Harvey MD      Or    diphenhydrAMINE  50 mg Intramuscular Q6H PRN Yasmin Harvey MD      diphenhydrAMINE-zinc acetate   Topical BID PRN Anjel Sagastume MD      escitalopram  20 mg Oral Daily La Flores MD      haloperidol  1 mg Oral Q6H PRN Yasmin Harvey MD      haloperidol  2.5 mg Oral Q4H PRN Max 4/day Yasmin Harvey MD      haloperidol  5 mg Oral Q4H PRN Max 4/day Yasmin Harvey MD       hydrocortisone   Topical 4x Daily PRN HOLLI Monge      hydrOXYzine HCL  100 mg Oral Q6H PRN Max 4/day Yasmin Harvey MD      Or    LORazepam  2 mg Intramuscular Q6H PRN Yasmin Harvey MD      hydrOXYzine HCL  25 mg Oral Q6H PRN Max 4/day Yasmin Harvey MD      melatonin  3 mg Oral HS Yasmin Harvey MD      metFORMIN  500 mg Oral Daily With Breakfast HOLLI Monge      minocycline  100 mg Oral Q12H KAYLIE HOLLI Monge      mirtazapine  45 mg Oral HS La Flores MD      nicotine polacrilex  4 mg Oral Q2H PRN Yasmin Harvey MD      polyethylene glycol  17 g Oral Daily PRN Yasmin Harvey MD      polyethylene glycol  17 g Oral Daily HOLLI Monge      propranolol  10 mg Oral Q12H [facility] HOLLI Anderson      senna-docusate sodium  1 tablet Oral Daily PRN Yasmin Harvey MD      senna-docusate sodium  2 tablet Oral BID La Flores MD      traZODone  50 mg Oral HS PRN Yasmin Harvey MD      white petrolatum-mineral oil   Topical TID PRN Anjel Sagastume MD       Risks / Benefits of Treatment:    Risks, benefits, and possible side effects of medications explained to patient and patient verbalizes understanding and agreement for treatment.    Counseling / Coordination of Care:    Patient's progress discussed with staff in treatment team meeting.  Medications, treatment progress and treatment plan reviewed with patient.    HOLLI Anderson 02/16/24

## 2024-02-17 PROCEDURE — 99232 SBSQ HOSP IP/OBS MODERATE 35: CPT | Performed by: PSYCHIATRY & NEUROLOGY

## 2024-02-17 RX ADMIN — ESCITALOPRAM OXALATE 20 MG: 10 TABLET ORAL at 09:41

## 2024-02-17 RX ADMIN — NICOTINE POLACRILEX 4 MG: 4 GUM, CHEWING ORAL at 17:52

## 2024-02-17 RX ADMIN — SENNOSIDES AND DOCUSATE SODIUM 2 TABLET: 8.6; 5 TABLET ORAL at 09:41

## 2024-02-17 RX ADMIN — CLOZAPINE 400 MG: 200 TABLET ORAL at 21:21

## 2024-02-17 RX ADMIN — POLYETHYLENE GLYCOL 3350 17 G: 17 POWDER, FOR SOLUTION ORAL at 09:41

## 2024-02-17 RX ADMIN — ATROPINE SULFATE 1 DROP: 10 SOLUTION/ DROPS OPHTHALMIC at 21:26

## 2024-02-17 RX ADMIN — PROPRANOLOL HYDROCHLORIDE 10 MG: 10 TABLET ORAL at 21:21

## 2024-02-17 RX ADMIN — SENNOSIDES AND DOCUSATE SODIUM 2 TABLET: 8.6; 5 TABLET ORAL at 17:35

## 2024-02-17 RX ADMIN — MIRTAZAPINE 45 MG: 30 TABLET, FILM COATED ORAL at 21:21

## 2024-02-17 RX ADMIN — MINOCYCLINE HYDROCHLORIDE 100 MG: 100 CAPSULE ORAL at 09:41

## 2024-02-17 RX ADMIN — MINOCYCLINE HYDROCHLORIDE 100 MG: 100 CAPSULE ORAL at 21:26

## 2024-02-17 RX ADMIN — MELATONIN TAB 3 MG 3 MG: 3 TAB at 21:21

## 2024-02-17 RX ADMIN — NICOTINE POLACRILEX 4 MG: 4 GUM, CHEWING ORAL at 12:56

## 2024-02-17 RX ADMIN — NICOTINE POLACRILEX 4 MG: 4 GUM, CHEWING ORAL at 19:58

## 2024-02-17 RX ADMIN — METFORMIN HYDROCHLORIDE 500 MG: 500 TABLET ORAL at 09:41

## 2024-02-17 NOTE — NURSING NOTE
"Pt is calm and withdrawn to room throughout the morning with a blunted affect and poverty of speech. Pt says \"voices are still the same\" and are derogatory telling him that he and his family are going to hell. Reports \"a little bit\" of depression. No anxiety or SI/HI/VH. No unmet needs at this time.   "

## 2024-02-17 NOTE — PROGRESS NOTES
"Progress Note - Behavioral Health   Alberto Berumen 28 y.o. male MRN: [id number]  Unit/Bed#: [facility] 383-02 Encounter: [id number]    Assessment/Plan   Principal Problem:    Schizoaffective disorder, bipolar type (HCC)  Active Problems:    GERD (gastroesophageal reflux disease)    Tobacco abuse    T wave inversion in EKG    Chronic idiopathic constipation    Vitamin B 12 deficiency    Vitamin D deficiency    Acanthosis nigricans    Rash      Behavior over the last 24 hours:  improved  Sleep: normal  Appetite: normal  Medication side effects: No  ROS: no complaints        Mental Status Evaluation:  Appearance:  age appropriate and disheveled   Behavior:  cooperative   Speech:  normal pitch and normal volume   Mood:  constricted   Affect:  constricted   Thought Process:  goal directed and logical   Associations: concrete associations   Thought Content:  delusions  persecutory   Perceptual Disturbances: None   Risk Potential: Suicidal Ideations none  Homicidal Ideations none  Potential for Aggression No   Sensorium:  person, place, and time/date   Memory:  recent and remote memory grossly intact   Consciousness:  alert and awake    Attention: attention span appeared shorter than expected for age   Insight:  limited   Judgment: limited   Gait/Station: normal gait/station and normal balance   Motor Activity: no abnormal movements     Progress Toward Goals: Patient remains compliant with medications and denies side effects.  According to staff report is calm and withdrawn to room throughout the morning with a blunted affect and poverty of speech. Pt says \"voices are still the same\" and are derogatory telling him that he and his family are going to hell. Reports \"a little bit\" of depression. No anxiety or SI/HI/VH.   He denies AH to me on assessment and denied depressed mood.  Will continue to monitor. Possible ECT treatment if no improvement.  Also EAC referral made.  Continue current treatment.      Recommended Treatment: " Continue with group therapy, milieu therapy and occupational therapy.      Treatment plan, treatment progress and medication changes were reviewed with nursing staff  Extended Acute Care referral  He is not decided yet on starting ECT - wants to talk to his sister  Recheck clozapine level today  Monitor CBC/diff, C-reactive protein, BNP, troponin, and ECG weekly  Continue current medications.    Risks, benefits and possible side effects of Medications:   Risks, benefits, and possible side effects of medications explained to patient and patient verbalizes understanding.      Medications: all current active meds have been reviewed, continue current psychiatric medications, and current meds:   Current Facility-Administered Medications   Medication Dose Route Frequency    acetaminophen (TYLENOL) tablet 650 mg  650 mg Oral Q6H PRN    acetaminophen (TYLENOL) tablet 650 mg  650 mg Oral Q4H PRN    acetaminophen (TYLENOL) tablet 975 mg  975 mg Oral Q6H PRN    aluminum-magnesium hydroxide-simethicone (MAALOX) oral suspension 30 mL  30 mL Oral Q4H PRN    atropine (ISOPTO ATROPINE) 1 % ophthalmic solution 1 drop  1 drop Sublingual HS    atropine (ISOPTO ATROPINE) 1 % ophthalmic solution 1 drop  1 drop Sublingual Daily PRN    haloperidol lactate (HALDOL) injection 2.5 mg  2.5 mg Intramuscular Q4H PRN Max 4/day    And    LORazepam (ATIVAN) injection 1 mg  1 mg Intramuscular Q4H PRN Max 4/day    And    benztropine (COGENTIN) injection 0.5 mg  0.5 mg Intramuscular Q4H PRN Max 4/day    haloperidol lactate (HALDOL) injection 5 mg  5 mg Intramuscular Q4H PRN Max 4/day    And    LORazepam (ATIVAN) injection 2 mg  2 mg Intramuscular Q4H PRN Max 4/day    And    benztropine (COGENTIN) injection 1 mg  1 mg Intramuscular Q4H PRN Max 4/day    benztropine (COGENTIN) tablet 1 mg  1 mg Oral Q4H PRN Max 6/day    bisacodyl (DULCOLAX) rectal suppository 10 mg  10 mg Rectal Daily PRN    clozapine (CLOZARIL) tablet 400 mg  400 mg Oral HS     hydrOXYzine HCL (ATARAX) tablet 50 mg  50 mg Oral Q6H PRN Max 4/day    Or    diphenhydrAMINE (BENADRYL) injection 50 mg  50 mg Intramuscular Q6H PRN    diphenhydrAMINE-zinc acetate (BENADRYL) 2-0.1 % cream   Topical BID PRN    escitalopram (LEXAPRO) tablet 20 mg  20 mg Oral Daily    haloperidol (HALDOL) tablet 1 mg  1 mg Oral Q6H PRN    haloperidol (HALDOL) tablet 2.5 mg  2.5 mg Oral Q4H PRN Max 4/day    haloperidol (HALDOL) tablet 5 mg  5 mg Oral Q4H PRN Max 4/day    hydrocortisone 1 % ointment   Topical 4x Daily PRN    hydrOXYzine HCL (ATARAX) tablet 100 mg  100 mg Oral Q6H PRN Max 4/day    Or    LORazepam (ATIVAN) injection 2 mg  2 mg Intramuscular Q6H PRN    hydrOXYzine HCL (ATARAX) tablet 25 mg  25 mg Oral Q6H PRN Max 4/day    melatonin tablet 3 mg  3 mg Oral HS    metFORMIN (GLUCOPHAGE) tablet 500 mg  500 mg Oral Daily With Breakfast    minocycline (MINOCIN) capsule 100 mg  100 mg Oral Q12H KAYLIE    mirtazapine (REMERON) tablet 45 mg  45 mg Oral HS    nicotine polacrilex (NICORETTE) gum 4 mg  4 mg Oral Q2H PRN    polyethylene glycol (MIRALAX) packet 17 g  17 g Oral Daily PRN    polyethylene glycol (MIRALAX) packet 17 g  17 g Oral Daily    propranolol (INDERAL) tablet 10 mg  10 mg Oral Q12H KAYLIE    senna-docusate sodium (SENOKOT S) 8.6-50 mg per tablet 1 tablet  1 tablet Oral Daily PRN    senna-docusate sodium (SENOKOT S) 8.6-50 mg per tablet 2 tablet  2 tablet Oral BID    traZODone (DESYREL) tablet 50 mg  50 mg Oral HS PRN    white petrolatum-mineral oil (EUCERIN,HYDROCERIN) cream   Topical TID PRN   .    Labs: I have personally reviewed all pertinent laboratory/tests results. Most Recent Labs:   Lab Results   Component Value Date    WBC 8.09 02/12/2024    RBC 5.01 02/12/2024    HGB 11.8 (L) 02/12/2024    HCT 40.3 02/12/2024     02/12/2024    RDW 13.2 02/12/2024    NEUTROABS 4.11 02/12/2024    SODIUM 136 01/11/2024    K 4.1 01/11/2024     01/11/2024    CO2 27 01/11/2024    BUN 12 01/11/2024     CREATININE 0.90 01/11/2024    GLUC 97 01/11/2024    GLUF 97 01/11/2024    CALCIUM 9.4 01/11/2024    AST 23 01/11/2024    ALT 50 01/11/2024    ALKPHOS 76 01/11/2024    TP 7.1 01/11/2024    ALB 4.0 01/11/2024    TBILI 0.43 01/11/2024    CHOLESTEROL 161 01/11/2024    HDL 49 01/11/2024    TRIG 132 01/11/2024    LDLCALC 86 01/11/2024    NONHDLC 112 01/11/2024    LITHIUM 0.61 01/09/2024    DNC2FDUPLQPF 1.062 11/15/2023    HGBA1C 5.8 (H) 01/23/2024     01/23/2024       Counseling / Coordination of Care  Total floor / unit time spent today n/a minutes. Greater than 50% of total time was spent with the patient and / or family counseling and / or coordination of care. A description of the counseling / coordination of care:

## 2024-02-17 NOTE — NURSING NOTE
Patient noted out of room on unit. Patient compliant with medications and routine care. Patient denies SI/HI/AH/VH at this time. Patient enjoys to socialize with peers and watch television in dayroom.

## 2024-02-17 NOTE — PLAN OF CARE
Problem: PSYCHOSIS  Goal: Will report no hallucinations or delusions  Description: Interventions:  - Administer medication as  ordered  - Every waking shifts and PRN assess for the presence of hallucinations and or delusions  - Assist with reality testing to support increasing orientation  - Assess if patient's hallucinations or delusions are encouraging self-harm or harm to others and intervene as appropriate  Outcome: Progressing     Problem: ANXIETY  Goal: Will report anxiety at manageable levels  Description: INTERVENTIONS:  - Administer medication as ordered  - Teach and encourage coping skills  - Provide emotional support  - Assess patient/family for anxiety and ability to cope  Outcome: Progressing     Problem: Ineffective Coping  Goal: Participates in unit activities  Description: Interventions:  - Provide therapeutic environment   - Provide required programming   - Redirect inappropriate behaviors   Outcome: Progressing     Problem: Depression  Goal: Verbalize thoughts and feelings  Description: Interventions:  - Assess and re-assess patient's level of risk   - Engage patient in 1:1 interactions, daily, for a minimum of 15 minutes   - Encourage patient to express feelings, fears, frustrations, hopes   Outcome: Progressing  Goal: Refrain from isolation  Description: Interventions:  - Develop a trusting relationship   - Encourage socialization   Outcome: Progressing

## 2024-02-18 PROCEDURE — 99232 SBSQ HOSP IP/OBS MODERATE 35: CPT | Performed by: PSYCHIATRY & NEUROLOGY

## 2024-02-18 RX ADMIN — NICOTINE POLACRILEX 4 MG: 4 GUM, CHEWING ORAL at 16:03

## 2024-02-18 RX ADMIN — NICOTINE POLACRILEX 4 MG: 4 GUM, CHEWING ORAL at 09:54

## 2024-02-18 RX ADMIN — ATROPINE SULFATE 1 DROP: 10 SOLUTION/ DROPS OPHTHALMIC at 21:30

## 2024-02-18 RX ADMIN — SENNOSIDES AND DOCUSATE SODIUM 2 TABLET: 8.6; 5 TABLET ORAL at 17:36

## 2024-02-18 RX ADMIN — NICOTINE POLACRILEX 4 MG: 4 GUM, CHEWING ORAL at 18:29

## 2024-02-18 RX ADMIN — NICOTINE POLACRILEX 4 MG: 4 GUM, CHEWING ORAL at 13:03

## 2024-02-18 RX ADMIN — CLOZAPINE 400 MG: 200 TABLET ORAL at 21:30

## 2024-02-18 RX ADMIN — MINOCYCLINE HYDROCHLORIDE 100 MG: 100 CAPSULE ORAL at 09:54

## 2024-02-18 RX ADMIN — MINOCYCLINE HYDROCHLORIDE 100 MG: 100 CAPSULE ORAL at 21:30

## 2024-02-18 RX ADMIN — PROPRANOLOL HYDROCHLORIDE 10 MG: 10 TABLET ORAL at 21:30

## 2024-02-18 RX ADMIN — METFORMIN HYDROCHLORIDE 500 MG: 500 TABLET ORAL at 09:54

## 2024-02-18 RX ADMIN — ESCITALOPRAM OXALATE 20 MG: 10 TABLET ORAL at 09:54

## 2024-02-18 RX ADMIN — MELATONIN TAB 3 MG 3 MG: 3 TAB at 21:30

## 2024-02-18 RX ADMIN — SENNOSIDES AND DOCUSATE SODIUM 2 TABLET: 8.6; 5 TABLET ORAL at 09:54

## 2024-02-18 RX ADMIN — POLYETHYLENE GLYCOL 3350 17 G: 17 POWDER, FOR SOLUTION ORAL at 09:54

## 2024-02-18 RX ADMIN — NICOTINE POLACRILEX 4 MG: 4 GUM, CHEWING ORAL at 20:37

## 2024-02-18 RX ADMIN — MIRTAZAPINE 45 MG: 30 TABLET, FILM COATED ORAL at 21:30

## 2024-02-18 RX ADMIN — PROPRANOLOL HYDROCHLORIDE 10 MG: 10 TABLET ORAL at 09:54

## 2024-02-18 NOTE — PROGRESS NOTES
"Progress Note - Behavioral Health   Alberto Berumen 28 y.o. male MRN: [id number]  Unit/Bed#: U 383-02 Encounter: [id number]    Assessment/Plan   Principal Problem:    Schizoaffective disorder, bipolar type (HCC)  Active Problems:    GERD (gastroesophageal reflux disease)    Tobacco abuse    T wave inversion in EKG    Chronic idiopathic constipation    Vitamin B 12 deficiency    Vitamin D deficiency    Acanthosis nigricans    Rash      Behavior over the last 24 hours:  improved  Sleep: normal  Appetite: normal  Medication side effects: No  ROS: no complaints        Mental Status Evaluation:  Appearance:  age appropriate and disheveled   Behavior:  cooperative   Speech:  normal pitch and normal volume   Mood:  constricted   Affect:  constricted   Thought Process:  goal directed and logical   Associations: concrete associations   Thought Content:  delusions  persecutory   Perceptual Disturbances: None   Risk Potential: Suicidal Ideations none  Homicidal Ideations none  Potential for Aggression No   Sensorium:  person, place, and time/date   Memory:  recent and remote memory grossly intact   Consciousness:  alert and awake    Attention: attention span appeared shorter than expected for age   Insight:  limited   Judgment: limited   Gait/Station: normal gait/station and normal balance   Motor Activity: no abnormal movements     Progress Toward Goals: Patient remains compliant with medications and denies side effects.  According to staff report  he is calm with a blunted affect sleeping in bed throughout the morning, skipping breakfast. He denies depression, anxiety, or SI/HI. Reports auditory hallucinations that are \"the same\" telling him he and his family are going to die. Remains in room throughout the morning.     He denies AH to me on assessment and denied depressed mood.  Will continue to monitor. Possible ECT treatment if no improvement.  Also EAC referral made.  Continue current treatment.      Recommended " Treatment: Continue with group therapy, milieu therapy and occupational therapy.      Treatment plan, treatment progress and medication changes were reviewed with nursing staff  Extended Acute Care referral  He is not decided yet on starting ECT - wants to talk to his sister  Recheck clozapine level today  Monitor CBC/diff, C-reactive protein, BNP, troponin, and ECG weekly  Continue current medications.    Risks, benefits and possible side effects of Medications:   Risks, benefits, and possible side effects of medications explained to patient and patient verbalizes understanding.      Medications: all current active meds have been reviewed, continue current psychiatric medications, and current meds:   Current Facility-Administered Medications   Medication Dose Route Frequency    acetaminophen (TYLENOL) tablet 650 mg  650 mg Oral Q6H PRN    acetaminophen (TYLENOL) tablet 650 mg  650 mg Oral Q4H PRN    acetaminophen (TYLENOL) tablet 975 mg  975 mg Oral Q6H PRN    aluminum-magnesium hydroxide-simethicone (MAALOX) oral suspension 30 mL  30 mL Oral Q4H PRN    atropine (ISOPTO ATROPINE) 1 % ophthalmic solution 1 drop  1 drop Sublingual HS    atropine (ISOPTO ATROPINE) 1 % ophthalmic solution 1 drop  1 drop Sublingual Daily PRN    haloperidol lactate (HALDOL) injection 2.5 mg  2.5 mg Intramuscular Q4H PRN Max 4/day    And    LORazepam (ATIVAN) injection 1 mg  1 mg Intramuscular Q4H PRN Max 4/day    And    benztropine (COGENTIN) injection 0.5 mg  0.5 mg Intramuscular Q4H PRN Max 4/day    haloperidol lactate (HALDOL) injection 5 mg  5 mg Intramuscular Q4H PRN Max 4/day    And    LORazepam (ATIVAN) injection 2 mg  2 mg Intramuscular Q4H PRN Max 4/day    And    benztropine (COGENTIN) injection 1 mg  1 mg Intramuscular Q4H PRN Max 4/day    benztropine (COGENTIN) tablet 1 mg  1 mg Oral Q4H PRN Max 6/day    bisacodyl (DULCOLAX) rectal suppository 10 mg  10 mg Rectal Daily PRN    clozapine (CLOZARIL) tablet 400 mg  400 mg Oral HS     hydrOXYzine HCL (ATARAX) tablet 50 mg  50 mg Oral Q6H PRN Max 4/day    Or    diphenhydrAMINE (BENADRYL) injection 50 mg  50 mg Intramuscular Q6H PRN    diphenhydrAMINE-zinc acetate (BENADRYL) 2-0.1 % cream   Topical BID PRN    escitalopram (LEXAPRO) tablet 20 mg  20 mg Oral Daily    haloperidol (HALDOL) tablet 1 mg  1 mg Oral Q6H PRN    haloperidol (HALDOL) tablet 2.5 mg  2.5 mg Oral Q4H PRN Max 4/day    haloperidol (HALDOL) tablet 5 mg  5 mg Oral Q4H PRN Max 4/day    hydrocortisone 1 % ointment   Topical 4x Daily PRN    hydrOXYzine HCL (ATARAX) tablet 100 mg  100 mg Oral Q6H PRN Max 4/day    Or    LORazepam (ATIVAN) injection 2 mg  2 mg Intramuscular Q6H PRN    hydrOXYzine HCL (ATARAX) tablet 25 mg  25 mg Oral Q6H PRN Max 4/day    melatonin tablet 3 mg  3 mg Oral HS    metFORMIN (GLUCOPHAGE) tablet 500 mg  500 mg Oral Daily With Breakfast    minocycline (MINOCIN) capsule 100 mg  100 mg Oral Q12H KAYLIE    mirtazapine (REMERON) tablet 45 mg  45 mg Oral HS    nicotine polacrilex (NICORETTE) gum 4 mg  4 mg Oral Q2H PRN    polyethylene glycol (MIRALAX) packet 17 g  17 g Oral Daily PRN    polyethylene glycol (MIRALAX) packet 17 g  17 g Oral Daily    propranolol (INDERAL) tablet 10 mg  10 mg Oral Q12H KAYLIE    senna-docusate sodium (SENOKOT S) 8.6-50 mg per tablet 1 tablet  1 tablet Oral Daily PRN    senna-docusate sodium (SENOKOT S) 8.6-50 mg per tablet 2 tablet  2 tablet Oral BID    traZODone (DESYREL) tablet 50 mg  50 mg Oral HS PRN    white petrolatum-mineral oil (EUCERIN,HYDROCERIN) cream   Topical TID PRN   .    Labs: I have personally reviewed all pertinent laboratory/tests results. Most Recent Labs:   Lab Results   Component Value Date    WBC 8.09 02/12/2024    RBC 5.01 02/12/2024    HGB 11.8 (L) 02/12/2024    HCT 40.3 02/12/2024     02/12/2024    RDW 13.2 02/12/2024    NEUTROABS 4.11 02/12/2024    SODIUM 136 01/11/2024    K 4.1 01/11/2024     01/11/2024    CO2 27 01/11/2024    BUN 12 01/11/2024     CREATININE 0.90 01/11/2024    GLUC 97 01/11/2024    GLUF 97 01/11/2024    CALCIUM 9.4 01/11/2024    AST 23 01/11/2024    ALT 50 01/11/2024    ALKPHOS 76 01/11/2024    TP 7.1 01/11/2024    ALB 4.0 01/11/2024    TBILI 0.43 01/11/2024    CHOLESTEROL 161 01/11/2024    HDL 49 01/11/2024    TRIG 132 01/11/2024    LDLCALC 86 01/11/2024    NONHDLC 112 01/11/2024    LITHIUM 0.61 01/09/2024    [id number] 1.062 11/15/2023    HGBA1C 5.8 (H) 01/23/2024     01/23/2024       Counseling / Coordination of Care  Total floor / unit time spent today n/a minutes. Greater than 50% of total time was spent with the patient and / or family counseling and / or coordination of care. A description of the counseling / coordination of care:

## 2024-02-18 NOTE — NURSING NOTE
Patient calm on approach, denies SI/HI/VH reports auditory hallucinations. Visible on unit socializing with peers compliant with medication administration and unit routine.

## 2024-02-18 NOTE — NURSING NOTE
"Pt is calm with a blunted affect sleeping in bed throughout the morning, skipping breakfast. He denies depression, anxiety, or SI/HI. Reports auditory hallucinations that are \"the same\" telling him he and his family are going to die. Remains in room throughout the morning.   "

## 2024-02-19 LAB
BASOPHILS # BLD AUTO: 0.05 THOUSANDS/ÂΜL (ref 0–0.1)
BASOPHILS NFR BLD AUTO: 1 % (ref 0–1)
BNP SERPL-MCNC: 4 PG/ML (ref 0–100)
CARDIAC TROPONIN I PNL SERPL HS: <2 NG/L (ref 8–18)
CK SERPL-CCNC: 205 U/L (ref 39–308)
CRP SERPL QL: 5.4 MG/L
EOSINOPHIL # BLD AUTO: 0.29 THOUSAND/ÂΜL (ref 0–0.61)
EOSINOPHIL NFR BLD AUTO: 4 % (ref 0–6)
ERYTHROCYTE [DISTWIDTH] IN BLOOD BY AUTOMATED COUNT: 13.2 % (ref 11.6–15.1)
HCT VFR BLD AUTO: 41.8 % (ref 36.5–49.3)
HGB BLD-MCNC: 12.5 G/DL (ref 12–17)
IMM GRANULOCYTES # BLD AUTO: 0.02 THOUSAND/UL (ref 0–0.2)
IMM GRANULOCYTES NFR BLD AUTO: 0 % (ref 0–2)
LYMPHOCYTES # BLD AUTO: 2.86 THOUSANDS/ÂΜL (ref 0.6–4.47)
LYMPHOCYTES NFR BLD AUTO: 36 % (ref 14–44)
MCH RBC QN AUTO: 23.8 PG (ref 26.8–34.3)
MCHC RBC AUTO-ENTMCNC: 29.9 G/DL (ref 31.4–37.4)
MCV RBC AUTO: 80 FL (ref 82–98)
MONOCYTES # BLD AUTO: 1.01 THOUSAND/ÂΜL (ref 0.17–1.22)
MONOCYTES NFR BLD AUTO: 13 % (ref 4–12)
NEUTROPHILS # BLD AUTO: 3.81 THOUSANDS/ÂΜL (ref 1.85–7.62)
NEUTS SEG NFR BLD AUTO: 46 % (ref 43–75)
NRBC BLD AUTO-RTO: 0 /100 WBCS
PLATELET # BLD AUTO: 233 THOUSANDS/UL (ref 149–390)
PMV BLD AUTO: 11.7 FL (ref 8.9–12.7)
RBC # BLD AUTO: 5.25 MILLION/UL (ref 3.88–5.62)
WBC # BLD AUTO: 8.04 THOUSAND/UL (ref 4.31–10.16)

## 2024-02-19 PROCEDURE — 84484 ASSAY OF TROPONIN QUANT: CPT | Performed by: PSYCHIATRY & NEUROLOGY

## 2024-02-19 PROCEDURE — 83880 ASSAY OF NATRIURETIC PEPTIDE: CPT | Performed by: NURSE PRACTITIONER

## 2024-02-19 PROCEDURE — 85025 COMPLETE CBC W/AUTO DIFF WBC: CPT | Performed by: NURSE PRACTITIONER

## 2024-02-19 PROCEDURE — 82550 ASSAY OF CK (CPK): CPT | Performed by: NURSE PRACTITIONER

## 2024-02-19 PROCEDURE — 86140 C-REACTIVE PROTEIN: CPT | Performed by: NURSE PRACTITIONER

## 2024-02-19 PROCEDURE — 93005 ELECTROCARDIOGRAM TRACING: CPT

## 2024-02-19 PROCEDURE — 99232 SBSQ HOSP IP/OBS MODERATE 35: CPT | Performed by: PSYCHIATRY & NEUROLOGY

## 2024-02-19 RX ADMIN — PROPRANOLOL HYDROCHLORIDE 10 MG: 10 TABLET ORAL at 08:15

## 2024-02-19 RX ADMIN — NICOTINE POLACRILEX 4 MG: 4 GUM, CHEWING ORAL at 12:56

## 2024-02-19 RX ADMIN — POLYETHYLENE GLYCOL 3350 17 G: 17 POWDER, FOR SOLUTION ORAL at 08:08

## 2024-02-19 RX ADMIN — MINOCYCLINE HYDROCHLORIDE 100 MG: 100 CAPSULE ORAL at 21:36

## 2024-02-19 RX ADMIN — CLOZAPINE 400 MG: 200 TABLET ORAL at 21:36

## 2024-02-19 RX ADMIN — METFORMIN HYDROCHLORIDE 500 MG: 500 TABLET ORAL at 08:08

## 2024-02-19 RX ADMIN — MINOCYCLINE HYDROCHLORIDE 100 MG: 100 CAPSULE ORAL at 08:14

## 2024-02-19 RX ADMIN — SENNOSIDES AND DOCUSATE SODIUM 2 TABLET: 8.6; 5 TABLET ORAL at 18:16

## 2024-02-19 RX ADMIN — ATROPINE SULFATE 1 DROP: 10 SOLUTION/ DROPS OPHTHALMIC at 21:36

## 2024-02-19 RX ADMIN — SENNOSIDES AND DOCUSATE SODIUM 2 TABLET: 8.6; 5 TABLET ORAL at 08:07

## 2024-02-19 RX ADMIN — MELATONIN TAB 3 MG 3 MG: 3 TAB at 21:36

## 2024-02-19 RX ADMIN — PROPRANOLOL HYDROCHLORIDE 10 MG: 10 TABLET ORAL at 21:36

## 2024-02-19 RX ADMIN — MIRTAZAPINE 45 MG: 30 TABLET, FILM COATED ORAL at 21:36

## 2024-02-19 RX ADMIN — ESCITALOPRAM OXALATE 20 MG: 10 TABLET ORAL at 08:08

## 2024-02-19 RX ADMIN — NICOTINE POLACRILEX 4 MG: 4 GUM, CHEWING ORAL at 18:01

## 2024-02-19 RX ADMIN — NICOTINE POLACRILEX 4 MG: 4 GUM, CHEWING ORAL at 20:07

## 2024-02-19 NOTE — NURSING NOTE
Patient is calm and cooperative on the unit. Denies SI, HI, SIB, visual hallucinations, Admits to continuous derogatory auditory hallucinations. Patient is social with peers, but isolative to bedroom this morning. Patient is pleasant and approachable. Denies any unmet needs at this time. Q7 safety checks ongoing.

## 2024-02-19 NOTE — PROGRESS NOTES
"Progress Note - Behavioral Health     Alberto Berumen 28 y.o. male MRN: [id number]   Unit/Bed#: -02 Encounter: [id number]    Behavior over the last 24 hours: unchanged.     Alberto continues to report some depression and still reports auditory hallucinations of \"voices saying that they will kill me. There is no change\". He is seclusive to his room and stayed in bed during the session. He denies suicidal or homicidal thoughts. He states that he talked to his sister over the weekend, but still did not make a decision regarding getting ECT \"can I have another day to think about it?\". Poor insights, poor motivation, poor self-care. Has been taking medications.    Sleep: hypersomnia  Appetite: normal  Medication side effects: No   ROS: no complaints, denies any headache, shortness of breath, or constipation, all other systems are negative    Mental Status Evaluation:    Appearance:  disheveled, marginal hygiene   Behavior:  cooperative, guarded, poor eye contact   Speech:  scant, soft   Mood:  dysphoric   Affect:  blunted   Thought Process:  organized, concrete   Associations: concrete associations   Thought Content:  some paranoia   Perceptual Disturbances: auditory hallucinations of \"voices saying that they will kill me\", no visual hallucinations   Risk Potential: Suicidal ideation - None at present  Homicidal ideation - None at present  Potential for aggression - No   Sensorium:  oriented to person, place, and time/date   Memory:  recent and remote memory grossly intact   Consciousness:  alert and awake   Attention/Concentration: poor concentration and poor attention span   Insight:  poor   Judgment: poor   Gait/Station: normal gait/station, normal balance   Motor Activity: no abnormal movements     Vital signs in last 24 hours:    Temp:  [97.8 °F (36.6 °C)-98.6 °F (37 °C)] 98.6 °F (37 °C)  HR:  [] 92  Resp:  [16-20] 20  BP: (116-165)/(75-95) 116/95    Laboratory results: I have personally reviewed all " pertinent laboratory/tests results    CBC:   Lab Results   Component Value Date    WBC 8.04 02/19/2024    RBC 5.25 02/19/2024    HGB 12.5 02/19/2024    HCT 41.8 02/19/2024    MCV 80 (L) 02/19/2024     02/19/2024    MCH 23.8 (L) 02/19/2024    MCHC 29.9 (L) 02/19/2024    RDW 13.2 02/19/2024    MPV 11.7 02/19/2024    NEUTROABS 3.81 02/19/2024   Clozapine:   Lab Results   Component Value Date    CLOZAPINE 492 02/16/2024   High Sensitivity Troponin   Lab Results   Component Value Date    HSTNI <2 (L) 02/19/2024   CPK   Lab Results   Component Value Date    CKTOTAL 205 02/19/2024   CRP   Lab Results   Component Value Date    CRP 5.4 (H) 02/19/2024   Brain Natruretic Peptide   Lab Results   Component Value Date    BNP 4 02/19/2024       Suicide/Homicide Risk Assessment:    Risk of Harm to Self:   Nursing Suicide Risk Assessment Last 24 hours: C-SSRS Risk (Since Last Contact)  Calculated C-SSRS Risk Score (Since Last Contact): No Risk Indicated  Current Specific Risk Factors include: mental illness diagnosis, current psychotic symptoms, poor self care  Protective Factors: no current suicidal ideation, ability to communicate with staff on the unit, taking medications as ordered on the unit  Based on today's assessment, Alberto presents the following risk of harm to self: low    Risk of Harm to Others:  Nursing Homicide Risk Assessment: Violence Risk to Others: Denies within past 6 months  Based on today's assessment, Alberto presents the following risk of harm to others: low    The following interventions are recommended: behavioral checks every 7 minutes, continued hospitalization on locked unit    Progress Toward Goals: no significant progress, still somewhat depressed, poor insight, poor motivation, still reports psychotic symptoms    Assessment/Plan   Principal Problem:    Schizoaffective disorder, bipolar type (HCC)  Active Problems:    GERD (gastroesophageal reflux disease)    Tobacco abuse    T wave inversion  in EKG    Chronic idiopathic constipation    Vitamin B 12 deficiency    Vitamin D deficiency    Acanthosis nigricans    Rash      Recommended Treatment:     Planned medication and treatment changes:    All current active medications have been reviewed  Encourage group therapy, milieu therapy and occupational therapy  Behavioral Health checks every 7 minutes  Extended Acute Care referral  He has not decided to start ECT yet at this time  Continue current Clozaril dose of 400 mg at bedtime for treatment of psychotic symptoms. Clozaril level is therapeutic at 492  Monitor CBC/diff, CPK, C-reactive protein, BNP, troponin, and ECG weekly    Continue all other medications:    Current Facility-Administered Medications   Medication Dose Route Frequency Provider Last Rate    acetaminophen  650 mg Oral Q6H PRN Yasmin Harvey MD      acetaminophen  650 mg Oral Q4H PRN Yasmin Harvey MD      acetaminophen  975 mg Oral Q6H PRN Yasmin Harvey MD      aluminum-magnesium hydroxide-simethicone  30 mL Oral Q4H PRN Yasmin Harvey MD      atropine  1 drop Sublingual HS HOLLI Anderson      atropine  1 drop Sublingual Daily PRN HOLLI Anderson      haloperidol lactate  2.5 mg Intramuscular Q4H PRN Max 4/day Yasmin Harvey MD      And    LORazepam  1 mg Intramuscular Q4H PRN Max 4/day Yasmin Harvey MD      And    benztropine  0.5 mg Intramuscular Q4H PRN Max 4/day Yasmin Harvey MD      haloperidol lactate  5 mg Intramuscular Q4H PRN Max 4/day Yasmin Harvey MD      And    LORazepam  2 mg Intramuscular Q4H PRN Max 4/day Yasmin Harvey MD      And    benztropine  1 mg Intramuscular Q4H PRN Max 4/day Yasmin Harvey MD      benztropine  1 mg Oral Q4H PRN Max 6/day Yasmin Harvey MD      bisacodyl  10 mg Rectal Daily PRN Yasmin Harvey MD      cloZAPine  400 mg Oral HS HOLLI Anderson      hydrOXYzine HCL  50 mg Oral Q6H PRN Max 4/day Yasmin Harvey MD      Or    diphenhydrAMINE  50 mg  Intramuscular Q6H PRN Yasmin Harvey MD      diphenhydrAMINE-zinc acetate   Topical BID PRN Anjel Sagastume MD      escitalopram  20 mg Oral Daily La Flores MD      haloperidol  1 mg Oral Q6H PRN Yasmin Harvey MD      haloperidol  2.5 mg Oral Q4H PRN Max 4/day Yasmin Harvey MD      haloperidol  5 mg Oral Q4H PRN Max 4/day Yasmin Harvey MD      hydrocortisone   Topical 4x Daily PRN HOLLI Monge      hydrOXYzine HCL  100 mg Oral Q6H PRN Max 4/day Yasmin Harvey MD      Or    LORazepam  2 mg Intramuscular Q6H PRN Yasmin Harvey MD      hydrOXYzine HCL  25 mg Oral Q6H PRN Max 4/day Yasmin Harvey MD      melatonin  3 mg Oral HS Yasmin Harvey MD      metFORMIN  500 mg Oral Daily With Breakfast HOLLI Monge      minocycline  100 mg Oral Q12H KAYLIE HOLLI Monge      mirtazapine  45 mg Oral HS La Flores MD      nicotine polacrilex  4 mg Oral Q2H PRN Yasmin Harvey MD      polyethylene glycol  17 g Oral Daily PRN Yasmin Harvey MD      polyethylene glycol  17 g Oral Daily Eileenaddie Jensen, HOLLI      propranolol  10 mg Oral Q12H [facility] HOLLI Villafuerte      senna-docusate sodium  1 tablet Oral Daily PRN Yasmin Harvey MD      senna-docusate sodium  2 tablet Oral BID La Flores MD      traZODone  50 mg Oral HS PRN Yasmin Harvey MD      white petrolatum-mineral oil   Topical TID PRN Anjel Sagastume MD       Risks / Benefits of Treatment:    Risks, benefits, and possible side effects of medications explained to patient. Patient has limited understanding of risks and benefits of treatment at this time, but agrees to take medications as prescribed.    Counseling / Coordination of Care:    Patient's progress discussed with staff in treatment team meeting.  Medications, treatment progress and treatment plan reviewed with patient.    La Flores MD 02/19/24

## 2024-02-19 NOTE — NURSING NOTE
Pt visible, social, active in milieu. Pt continues to endorse AH of voices telling him they are going to kill him and his family. Pt denies SI/HI/VH and current unmet needs. Pt is medication adherent.

## 2024-02-20 LAB
ATRIAL RATE: 93 BPM
P AXIS: 48 DEGREES
PR INTERVAL: 140 MS
QRS AXIS: 50 DEGREES
QRSD INTERVAL: 86 MS
QT INTERVAL: 368 MS
QTC INTERVAL: 457 MS
T WAVE AXIS: 5 DEGREES
VENTRICULAR RATE: 93 BPM

## 2024-02-20 PROCEDURE — 99232 SBSQ HOSP IP/OBS MODERATE 35: CPT | Performed by: PSYCHIATRY & NEUROLOGY

## 2024-02-20 PROCEDURE — 99232 SBSQ HOSP IP/OBS MODERATE 35: CPT | Performed by: NURSE PRACTITIONER

## 2024-02-20 PROCEDURE — 93010 ELECTROCARDIOGRAM REPORT: CPT | Performed by: STUDENT IN AN ORGANIZED HEALTH CARE EDUCATION/TRAINING PROGRAM

## 2024-02-20 RX ADMIN — METFORMIN HYDROCHLORIDE 500 MG: 500 TABLET ORAL at 08:11

## 2024-02-20 RX ADMIN — PROPRANOLOL HYDROCHLORIDE 10 MG: 10 TABLET ORAL at 21:09

## 2024-02-20 RX ADMIN — CLOZAPINE 400 MG: 200 TABLET ORAL at 21:05

## 2024-02-20 RX ADMIN — MELATONIN TAB 3 MG 3 MG: 3 TAB at 21:05

## 2024-02-20 RX ADMIN — NICOTINE POLACRILEX 4 MG: 4 GUM, CHEWING ORAL at 17:55

## 2024-02-20 RX ADMIN — ESCITALOPRAM OXALATE 20 MG: 10 TABLET ORAL at 08:11

## 2024-02-20 RX ADMIN — ATROPINE SULFATE 1 DROP: 10 SOLUTION/ DROPS OPHTHALMIC at 21:05

## 2024-02-20 RX ADMIN — POLYETHYLENE GLYCOL 3350 17 G: 17 POWDER, FOR SOLUTION ORAL at 08:12

## 2024-02-20 RX ADMIN — NICOTINE POLACRILEX 4 MG: 4 GUM, CHEWING ORAL at 21:02

## 2024-02-20 RX ADMIN — MIRTAZAPINE 45 MG: 30 TABLET, FILM COATED ORAL at 21:05

## 2024-02-20 RX ADMIN — MINOCYCLINE HYDROCHLORIDE 100 MG: 100 CAPSULE ORAL at 08:12

## 2024-02-20 RX ADMIN — MINOCYCLINE HYDROCHLORIDE 100 MG: 100 CAPSULE ORAL at 21:05

## 2024-02-20 RX ADMIN — NICOTINE POLACRILEX 4 MG: 4 GUM, CHEWING ORAL at 13:54

## 2024-02-20 RX ADMIN — SENNOSIDES AND DOCUSATE SODIUM 2 TABLET: 8.6; 5 TABLET ORAL at 17:26

## 2024-02-20 RX ADMIN — SENNOSIDES AND DOCUSATE SODIUM 2 TABLET: 8.6; 5 TABLET ORAL at 08:11

## 2024-02-20 RX ADMIN — PROPRANOLOL HYDROCHLORIDE 10 MG: 10 TABLET ORAL at 08:11

## 2024-02-20 NOTE — PROGRESS NOTES
02/19/24 0835   Team Meeting   Meeting Type Daily Rounds   Team Members Present   Team Members Present Physician;Nurse;   Physician Team Member Mark / Deysi   Nursing Team Member Gary   Care Management Team Member Rabia   Patient/Family Present   Patient Present No   Patient's Family Present No     Pt reports AH, visible on the unit and compliant with meds and meals. Does not want ECT treatment. Continue meds. Pending [facility] meeting ([facility] and [facility]) for EAC placement. Continue to monitor.

## 2024-02-20 NOTE — PROGRESS NOTES
"Progress Note - Behavioral Health   Alberto Berumen 28 y.o. male MRN: [id number]  Unit/Bed#: [facility] 383-02 Encounter: [id number]    Patient was seen today for continuation of care, records reviewed and patient was discussed with the morning case review team.    Alberto was seen today for psychiatric follow-up.  On assessment today, Alberto was seen in his room, resting in bed. Patient appears depressed with a blunted affect.  He states \"I am doing ok,\" but endorses ongoing auditory hallucinations that \"are still the same,\" voices that say they are \"going to kill me.\"  He denies problems with appetite or sleep, and reports he is sleeping a little more than he should during the day.  He denies side effects to his medications. Last week the patient requested the weekend to think about ECT and to discuss it with his sister. Today he reports his sister \"thinks I should do it\" and he is agreeable to start.     Alberto denies acute suicidal/self-harm ideation/intent/plan upon direct inquiry at this time.  Alberto remains behaviorally appropriate, no agitation or aggression noted on exam or in report.  Alberto also denies HI/VH, and does not appear overtly manic.  No overt delusions or paranoia are verbalized. Impulse control is good.  Alberto remains adherent to his current psychotropic medication regimen and denies any side effects from medications, as well as none noted on exam.    Recommended Treatment: Treatment plan and medication changes discussed and per the attending physician the plan is:    1.Continue with group therapy, milieu therapy and occupational therapy  2.Behavioral Health checks every 7 minutes  3.Continue frequent safety checks and vitals per unit protocol  4.Continue with [facility] medical management as indicated, awaiting medical clearance for ECT.  5.Continue with current medication regimen with plans to start ECT. Dr Amaya to complete second opinion for ECT. May also consider increasing Clozapine later in " the week if patient continues endorse auditory hallucinations and dysphoric mood.  6.Clozapine level therapeutic at 492 on 2/16/24.  7.Disposition Planning: Discharge planning and efforts remain ongoing    Vitals:  Vitals:    02/20/24 0733   BP: 138/89   Pulse: 92   Resp: 16   Temp: 97.6 °F (36.4 °C)   SpO2: 98%       Laboratory Results:  I have personally reviewed all pertinent laboratory/tests results.    Psychiatric Review of Systems:  Behavior over the last 24 hours:  unchanged.   Sleep: Hypersomnia  Appetite: Good  Medication side effects: Denies  ROS: no complaints, denies shortness of breath or chest pain and all other systems are negative for acute changes    Mental Status Evaluation:    Appearance:  disheveled, marginal hygiene   Behavior:  cooperative, calm, minimal eye contact   Speech:  normal rate, clear, scant, soft   Mood:  depressed   Affect:  blunted   Thought Process:  organized, logical, coherent   Associations: intact associations   Thought Content:  some paranoia   Perceptual Disturbances: auditory hallucinations, no visual hallucinations   Risk Potential: Suicidal ideation - None  Homicidal ideation - None  Potential for aggression - No   Sensorium:  oriented to person, place, time/date, and situation   Memory:  recent and remote memory grossly intact   Consciousness:  alert and awake   Attention/Concentration: poor concentration and poor attention span   Insight:  poor   Judgment: poor   Gait/Station: normal gait/station   Motor Activity: no abnormal movements     Progress Toward Goals:   Alberto is progressing towards goals of inpatient psychiatric treatment by continued medication compliance and is attending therapeutic modalities on the milieu. However, the patient continues to require inpatient psychiatric hospitalization for continued medication management and titration to optimize symptom reduction, improve sleep hygiene, and demonstrate adequate self-care.    Risk of Harm to Self:    Nursing Suicide Risk Assessment Last 24 hours: C-SSRS Risk (Since Last Contact)  Calculated C-SSRS Risk Score (Since Last Contact): No Risk Indicated    Risk of Harm to Others:  Nursing Homicide Risk Assessment: Violence Risk to Others: Denies within past 6 months    The following interventions are recommended: behavioral checks every 7 minutes, continued hospitalization on locked unit      Assessment/Plan   Principal Problem:    Schizoaffective disorder, bipolar type (HCC)  Active Problems:    GERD (gastroesophageal reflux disease)    Tobacco abuse    T wave inversion in EKG    Chronic idiopathic constipation    Vitamin B 12 deficiency    Vitamin D deficiency    Acanthosis nigricans    Rash        Behavioral Health Medications: all current active meds have been reviewed.  Current Facility-Administered Medications   Medication Dose Route Frequency Provider Last Rate    acetaminophen  650 mg Oral Q6H PRN Yasmin Harvey MD      acetaminophen  650 mg Oral Q4H PRN Yasmin Harvey MD      acetaminophen  975 mg Oral Q6H PRN Yasmin Harvey MD      aluminum-magnesium hydroxide-simethicone  30 mL Oral Q4H PRN Yasmin Harvey MD      atropine  1 drop Sublingual HS HOLLI Anderson      atropine  1 drop Sublingual Daily PRN HOLLI Anderson      haloperidol lactate  2.5 mg Intramuscular Q4H PRN Max 4/day Yasmin Harvey MD      And    LORazepam  1 mg Intramuscular Q4H PRN Max 4/day Yasmin Harvey MD      And    benztropine  0.5 mg Intramuscular Q4H PRN Max 4/day Yasmin Harvey MD      haloperidol lactate  5 mg Intramuscular Q4H PRN Max 4/day Yasmin Harvey MD      And    LORazepam  2 mg Intramuscular Q4H PRN Max 4/day Yasmin Harvey MD      And    benztropine  1 mg Intramuscular Q4H PRN Max 4/day Yasmin Harvey MD      benztropine  1 mg Oral Q4H PRN Max 6/day Yasmin Harvey MD      bisacodyl  10 mg Rectal Daily PRN Yasmin Harvey MD      cloZAPine  400 mg Oral HS HOLLI Anderson       hydrOXYzine HCL  50 mg Oral Q6H PRN Max 4/day Yasmin Harvey MD      Or    diphenhydrAMINE  50 mg Intramuscular Q6H PRN Yasmin Harvey MD      diphenhydrAMINE-zinc acetate   Topical BID PRN Anjel Sagastume MD      escitalopram  20 mg Oral Daily La Flores MD      haloperidol  1 mg Oral Q6H PRN Yasmin Harvey MD      haloperidol  2.5 mg Oral Q4H PRN Max 4/day Yasmin Harvey MD      haloperidol  5 mg Oral Q4H PRN Max 4/day Yasmin Harvey MD      hydrocortisone   Topical 4x Daily PRN HOLLI Monge      hydrOXYzine HCL  100 mg Oral Q6H PRN Max 4/day Yasmin Harvey MD      Or    LORazepam  2 mg Intramuscular Q6H PRN Yasmin Harvey MD      hydrOXYzine HCL  25 mg Oral Q6H PRN Max 4/day Yasmin Harvey MD      melatonin  3 mg Oral HS Yasmin Harvey MD      metFORMIN  500 mg Oral Daily With Breakfast HOLLI Monge      minocycline  100 mg Oral Q12H KAYLIE HOLLI Monge      mirtazapine  45 mg Oral HS La Flores MD      nicotine polacrilex  4 mg Oral Q2H PRN Yasmin Harvey MD      polyethylene glycol  17 g Oral Daily PRN Yasmin Harvey MD      polyethylene glycol  17 g Oral Daily HOLLI Monge      propranolol  10 mg Oral Q12H [facility] HOLLI Anderson      senna-docusate sodium  1 tablet Oral Daily PRN Yasmin Harvey MD      senna-docusate sodium  2 tablet Oral BID La Flores MD      traZODone  50 mg Oral HS PRN Yasmin Harvey MD      white petrolatum-mineral oil   Topical TID PRN Anjel Sagastume MD         Risks / Benefits of Treatment:  Risks, benefits, and possible side effects of medications explained to patient and patient verbalizes understanding and agreement for treatment.    Counseling / Coordination of Care:  Patient's progress reviewed with nursing staff.  Medications, treatment progress and treatment plan reviewed with patient.  Supportive counseling provided to the patient.    Total floor/unit time  spent today 25 minutes. Greater than 50% of total time was spent with the patient and / or family counseling and / or coordination of care. A description of the counseling / coordination of care: medication education, treatment plan, supportive therapy.

## 2024-02-20 NOTE — PROGRESS NOTES
"[facility]  Progress Note  Name: Alberto Berumen I  MRN: [id number]  Unit/Bed#: -02 I Date of Admission: 2023   Date of Service: 2024 I Hospital Day: 98    Assessment/Plan   * Schizoaffective disorder, bipolar type (HCC)  Assessment & Plan  Per psych they would like ECT clearance  This is an ECT clearance note       ECT Clearance:  History of recent seizure or stroke: pt denies  History of pheochromocytoma: pt denies  History of active bleeding (Intracranial hemorrhage, aneurysm or AVM):  pt denies  History of metallic implants in the head or neck:  pt denies  History of increased intracranial pressure with mass effect:  pt denies    EKG within 3 months yes 2024  If yes, was an arrhythmia present and at baseline no  If yes, what is the baseline QT interval 457      Based on above criteria, Patient Is medically cleared for ECT should it be recommended.      Nurse Coordination of Care Discussion: 15 mins    Discussions with Specialists or Other Care Team Provider: 10 mins    Education and Discussions with  Patient: 15 mins    Time Spent for Care: 45 minutes.  More than 50% of total time spent on counseling and coordination of care as described above.    Current Length of Stay: 98 day(s)    Code Status: Level 1 - Full Code      Subjective:   \" I understand about the ECT\"    Objective:     Vitals:   Temp (24hrs), Av.6 °F (36.4 °C), Min:97.6 °F (36.4 °C), Max:97.6 °F (36.4 °C)    Temp:  [97.6 °F (36.4 °C)] 97.6 °F (36.4 °C)  HR:  [92-98] 92  Resp:  [16] 16  BP: (138-141)/(89-90) 138/89  SpO2:  [98 %-100 %] 98 %  Body mass index is 39.81 kg/m².       Physical Exam:     Physical Exam  HENT:      Head: Normocephalic and atraumatic.      Nose: Nose normal.      Mouth/Throat:      Mouth: Mucous membranes are moist.   Eyes:      Extraocular Movements: Extraocular movements intact.   Cardiovascular:      Rate and Rhythm: Normal rate and regular rhythm.   Pulmonary:      " Effort: Pulmonary effort is normal.      Breath sounds: Normal breath sounds.   Abdominal:      General: Abdomen is flat. Bowel sounds are normal.   Musculoskeletal:         General: Normal range of motion.      Cervical back: Normal range of motion.   Skin:     General: Skin is warm and dry.   Neurological:      Mental Status: He is alert.           Additional Data:     Labs:    Results from last 7 days   Lab Units 02/19/24  0631   WBC Thousand/uL 8.04   HEMOGLOBIN g/dL 12.5   HEMATOCRIT % 41.8   PLATELETS Thousands/uL 233   NEUTROS PCT % 46   LYMPHS PCT % 36   MONOS PCT % 13*   EOS PCT % 4                         * I Have Reviewed All Lab Data Listed Above.  * Additional Pertinent Lab Tests Reviewed: All Labs For Current Hospital Admission Reviewed    Imaging:    Imaging Reports Reviewed Today Include: none      Last 24 Hours Medication List:   Current Facility-Administered Medications   Medication Dose Route Frequency Provider Last Rate    acetaminophen  650 mg Oral Q6H PRN Yasmin Harvey MD      acetaminophen  650 mg Oral Q4H PRN Yasmin Harvey MD      acetaminophen  975 mg Oral Q6H PRN Yasmin Harvey MD      aluminum-magnesium hydroxide-simethicone  30 mL Oral Q4H PRN Yasmin Harvey MD      atropine  1 drop Sublingual HS HOLLI Anderson      atropine  1 drop Sublingual Daily PRN HOLLI Anderson      haloperidol lactate  2.5 mg Intramuscular Q4H PRN Max 4/day Yasmin Harvey MD      And    LORazepam  1 mg Intramuscular Q4H PRN Max 4/day Yasmin Harvey MD      And    benztropine  0.5 mg Intramuscular Q4H PRN Max 4/day Yasmin Harvey MD      haloperidol lactate  5 mg Intramuscular Q4H PRN Max 4/day Yasmin Harvey MD      And    LORazepam  2 mg Intramuscular Q4H PRN Max 4/day Yasmin Harvey MD      And    benztropine  1 mg Intramuscular Q4H PRN Max 4/day Yasmin Harvey MD      benztropine  1 mg Oral Q4H PRN Max 6/day Yasmin Harvey MD      bisacodyl  10 mg Rectal Daily PRN Yasmin  GASPER Harvey MD      cloZAPine  400 mg Oral HS HOLLI Anderson      hydrOXYzine HCL  50 mg Oral Q6H PRN Max 4/day Yasmin Harvey MD      Or    diphenhydrAMINE  50 mg Intramuscular Q6H PRN Yasmin Harvey MD      diphenhydrAMINE-zinc acetate   Topical BID PRN Anjel Sagastume MD      escitalopram  20 mg Oral Daily La Flores MD      haloperidol  1 mg Oral Q6H PRN Yasmin Harvey MD      haloperidol  2.5 mg Oral Q4H PRN Max 4/day Yasmin Harvey MD      haloperidol  5 mg Oral Q4H PRN Max 4/day Yasmin Harvey MD      hydrocortisone   Topical 4x Daily PRN HOLLI Monge      hydrOXYzine HCL  100 mg Oral Q6H PRN Max 4/day Yasmin Harvey MD      Or    LORazepam  2 mg Intramuscular Q6H PRN Yasmin Harvey MD      hydrOXYzine HCL  25 mg Oral Q6H PRN Max 4/day Yasmin Harvey MD      melatonin  3 mg Oral HS Yasmin Harvey MD      metFORMIN  500 mg Oral Daily With Breakfast HOLLI Monge      minocycline  100 mg Oral Q12H KAYLIE HOLLI Monge      mirtazapine  45 mg Oral HS La Flores MD      nicotine polacrilex  4 mg Oral Q2H PRN Yasmin Harvey MD      polyethylene glycol  17 g Oral Daily PRN Yasmin Harvey MD      polyethylene glycol  17 g Oral Daily HOLLI Monge      propranolol  10 mg Oral Q12H KAYLIE HOLLI Anderson      senna-docusate sodium  1 tablet Oral Daily PRN Yasmin Harvey MD      senna-docusate sodium  2 tablet Oral BID La Flores MD      traZODone  50 mg Oral HS PRN Yasmin Harvey MD      white petrolatum-mineral oil   Topical TID PRN Anjel Sagastume MD          Today, Patient Was Seen By: HOLLI Galvan    [id number] twelve-lead EKG reviewed by myself as well as interpreted by myself ventricular rate 93 QT interval 368 QTc 457 normal sinus rhythm nonspecific T wave abnormalities when compared with an EKG from 2024 and 2024 there are no no acute changes in this EKG.    **  Please Note: Dictation voice to text software may have been used in the creation of this document. **    I have spent a total time of 45 minutes on 02/20/24 in caring for this patient including Diagnostic results, Prognosis, Risks and benefits of tx options, Instructions for management, Patient and family education, Importance of tx compliance, Risk factor reductions, Impressions, Counseling / Coordination of care, Documenting in the medical record, Reviewing / ordering tests, medicine, procedures  , Obtaining or reviewing history  , and Communicating with other healthcare professionals .

## 2024-02-20 NOTE — PROGRESS NOTES
02/20/24 3590   Team Meeting   Meeting Type Daily Rounds   Team Members Present   Team Members Present Physician;Nurse;   Physician Team Member Mark   Nursing Team Member Giancarlo   Care Management Team Member Miguelito   Patient/Family Present   Patient Present No   Patient's Family Present No     Patient currently holds 201 status. Patient is medication and meal compliant. Patient continues to report AH. Patient denies all other symptoms. Patient to continue on all current scheduled medications. Patient discharge date and time is to be determined.

## 2024-02-20 NOTE — NURSING NOTE
Pt is calm and cooperative. Visible in the milieu, social with staff and peers. Denies SI, HI, VH, and pain. Reports ongoing AH that are manageable. Compliant with medication and snack. Denies unmet needs/ concerns at this time.

## 2024-02-20 NOTE — ASSESSMENT & PLAN NOTE
[facility]  Management per primary service   
Noted on admission labs   Will replete with Vitamin B12  
Noted on admission labs  Will replete with Vitamin D  
Noted on prior EKGs   Chest pain free   Recommend outpatient cardiology evaluation   
Patient is medically cleared for admission to [facility] and treatment of underlying psychiatric illness based on available results  No acute medical needs. Medicine will sign off. Please call with additional questions or concerns  
Per psych they would like ECT clearance  This is an ECT clearance note  
Per psych they would like ECT clearance  This is an ECT clearance note  
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   
Smoking cessation education and counseling  Nicotine patch   
Suspecting as patient has dark, velvety patches in body folds on chest, back, and neck   No pain or itching   No improvement despite hydration, hydrocortisone cream, or ketoconazole body wash   Recommend weight loss as BMI is 40.3  Repeat A1c   
Suspecting as patient has dark, velvety patches in body folds on neck   No pain or itching   A1c 4.9 -> 6.2 with 30 lb weight gain in past 6 months   Change to carb controlled diet   Add Metformin 500 mg daily   Would benefit from weight loss and diabetes control  
Will add Miralax   
Will continue Maalox PRN  Consider adding a standing medications pending course    
No

## 2024-02-20 NOTE — QUICK NOTE
Patient seen and evaluated for second opinion for ECT.  Patient would benefit from ECT for ongoing treatment of unabated auditory hallucinations/psychotic symptoms in setting of failure of numerous medication trials.  Explained risks and benefits of treatment and he was in agreement to continue.  Signed ECT consent form.

## 2024-02-20 NOTE — PLAN OF CARE
Problem: Electroconvulsive therapy (ECT)  Goal: Treatment Goal: Demonstrate a reduction of confusion and improved orientation to person, place, time and/or situation upon discharge, according to optimum baseline/ability  Outcome: Progressing  Goal: Verbalizes/displays adequate comfort level or baseline comfort level  Description: Interventions:  - Encourage patient to monitor pain and request assistance  - Assess pain using appropriate pain scale  - Administer analgesics based on type and severity of pain and evaluate response  - Implement non-pharmacological measures as appropriate and evaluate response  - Consider cultural and social influences on pain and pain management  - Notify physician/advanced practitioner if interventions unsuccessful or patient reports new pain  Outcome: Progressing  Goal: Achieves stable or improved neurological status  Description: INTERVENTIONS  - Monitor and report changes in neurological status  - Monitor vital signs such as temperature, blood pressure, glucose, and any other labs ordered   - Initiate measures to prevent increased intracranial pressure  - Monitor for seizure activity and implement precautions if appropriate      Outcome: Progressing  Goal: Achieves maximal functionality and self care  Description: INTERVENTIONS  - Monitor swallowing and airway patency with patient fatigue and changes in neurological status  - Encourage and assist patient to increase activity and self care.   - Encourage visually impaired, hearing impaired and aphasic patients to use assistive/communication devices  Outcome: Progressing  Goal: Maintain or return mobility to safest level of function  Description: INTERVENTIONS:  - Assess patient's ability to carry out ADLs; assess patient's baseline for ADL function and identify physical deficits which impact ability to perform ADLs (bathing, care of mouth/teeth, toileting, grooming, dressing, etc.)  - Assess/evaluate cause of self-care deficits   -  Assess range of motion  - Assess patient's mobility  - Assess patient's need for assistive devices and provide as appropriate  - Encourage maximum independence but intervene and supervise when necessary  - Involve family in performance of ADLs  - Assess for home care needs following discharge   - Consider OT consult to assist with ADL evaluation and planning for discharge  - Provide patient education as appropriate  Outcome: Progressing  Goal: Absence of urinary retention  Description: INTERVENTIONS:  - Assess patient’s ability to void and empty bladder  - Monitor I/O  - Bladder scan as needed  - Discuss with physician/AP medications to alleviate retention as needed  - Discuss catheterization for long term situations as appropriate  Outcome: Progressing  Goal: Minimal or absence of nausea and/or vomiting  Description: INTERVENTIONS:  - Administer IV fluids if ordered to ensure adequate hydration  - Maintain NPO status until nausea and vomiting are resolved  - Nasogastric tube if ordered  - Administer ordered antiemetic medications as needed  - Provide nonpharmacologic comfort measures as appropriate  - Advance diet as tolerated, if ordered  - Consider nutrition services referral to assist patient with adequate nutrition and appropriate food choices  Outcome: Progressing  Goal: Maintains adequate nutritional intake  Description: INTERVENTIONS:  - Monitor percentage of each meal consumed  - Identify factors contributing to decreased intake, treat as appropriate  - Assist with meals as needed  - Monitor I&O, weight, and lab values if indicated  - Obtain nutrition services referral as needed  Outcome: Progressing

## 2024-02-20 NOTE — NURSING NOTE
Patient denies SI, HI, VH, but reports AH. Patient has been quiet and withdrawn to self mostly resting in his bed. Not seen much on the milieu or attending groups. Declined breakfast this morning but was compliant with medications. Patient denies any further needs at the moment.

## 2024-02-20 NOTE — PLAN OF CARE
Problem: PSYCHOSIS  Goal: Will report no hallucinations or delusions  Description: Interventions:  - Administer medication as  ordered  - Every waking shifts and PRN assess for the presence of hallucinations and or delusions  - Assist with reality testing to support increasing orientation  - Assess if patient's hallucinations or delusions are encouraging self-harm or harm to others and intervene as appropriate  Outcome: Progressing     Problem: ANXIETY  Goal: Will report anxiety at manageable levels  Description: INTERVENTIONS:  - Administer medication as ordered  - Teach and encourage coping skills  - Provide emotional support  - Assess patient/family for anxiety and ability to cope  Outcome: Progressing     Problem: Ineffective Coping  Goal: Participates in unit activities  Description: Interventions:  - Provide therapeutic environment   - Provide required programming   - Redirect inappropriate behaviors   Outcome: Progressing     Problem: Depression  Goal: Treatment Goal: Demonstrate behavioral control of depressive symptoms, verbalize feelings of improved mood/affect, and adopt new coping skills prior to discharge  Outcome: Progressing  Goal: Verbalize thoughts and feelings  Description: Interventions:  - Assess and re-assess patient's level of risk   - Engage patient in 1:1 interactions, daily, for a minimum of 15 minutes   - Encourage patient to express feelings, fears, frustrations, hopes   Outcome: Progressing  Goal: Refrain from isolation  Description: Interventions:  - Develop a trusting relationship   - Encourage socialization   Outcome: Progressing  Goal: Refrain from self-neglect  Outcome: Progressing     Problem: DISCHARGE PLANNING  Goal: Discharge to home or other facility with appropriate resources  Description: INTERVENTIONS:  - Identify barriers to discharge w/patient and caregiver  - Arrange for needed discharge resources and transportation as appropriate  - Identify discharge learning needs  (meds, wound care, etc.)  - Arrange for interpretive services to assist at discharge as needed  - Refer to Case Management Department for coordinating discharge planning if the patient needs post-hospital services based on physician/advanced practitioner order or complex needs related to functional status, cognitive ability, or social support system  Outcome: Progressing

## 2024-02-21 PROCEDURE — 99232 SBSQ HOSP IP/OBS MODERATE 35: CPT | Performed by: PSYCHIATRY & NEUROLOGY

## 2024-02-21 RX ADMIN — PROPRANOLOL HYDROCHLORIDE 10 MG: 10 TABLET ORAL at 21:06

## 2024-02-21 RX ADMIN — MELATONIN TAB 3 MG 3 MG: 3 TAB at 21:06

## 2024-02-21 RX ADMIN — SENNOSIDES AND DOCUSATE SODIUM 2 TABLET: 8.6; 5 TABLET ORAL at 17:13

## 2024-02-21 RX ADMIN — POLYETHYLENE GLYCOL 3350 17 G: 17 POWDER, FOR SOLUTION ORAL at 08:16

## 2024-02-21 RX ADMIN — NICOTINE POLACRILEX 4 MG: 4 GUM, CHEWING ORAL at 15:53

## 2024-02-21 RX ADMIN — NICOTINE POLACRILEX 4 MG: 4 GUM, CHEWING ORAL at 17:59

## 2024-02-21 RX ADMIN — SENNOSIDES AND DOCUSATE SODIUM 2 TABLET: 8.6; 5 TABLET ORAL at 08:18

## 2024-02-21 RX ADMIN — NICOTINE POLACRILEX 4 MG: 4 GUM, CHEWING ORAL at 21:06

## 2024-02-21 RX ADMIN — ATROPINE SULFATE 1 DROP: 10 SOLUTION/ DROPS OPHTHALMIC at 21:06

## 2024-02-21 RX ADMIN — METFORMIN HYDROCHLORIDE 500 MG: 500 TABLET ORAL at 08:18

## 2024-02-21 RX ADMIN — MINOCYCLINE HYDROCHLORIDE 100 MG: 100 CAPSULE ORAL at 21:06

## 2024-02-21 RX ADMIN — MINOCYCLINE HYDROCHLORIDE 100 MG: 100 CAPSULE ORAL at 08:21

## 2024-02-21 RX ADMIN — NICOTINE POLACRILEX 4 MG: 4 GUM, CHEWING ORAL at 13:27

## 2024-02-21 RX ADMIN — MIRTAZAPINE 45 MG: 30 TABLET, FILM COATED ORAL at 21:06

## 2024-02-21 RX ADMIN — CLOZAPINE 400 MG: 200 TABLET ORAL at 21:06

## 2024-02-21 RX ADMIN — PROPRANOLOL HYDROCHLORIDE 10 MG: 10 TABLET ORAL at 08:18

## 2024-02-21 RX ADMIN — ESCITALOPRAM OXALATE 20 MG: 10 TABLET ORAL at 08:17

## 2024-02-21 NOTE — PROGRESS NOTES
Progress Note - Behavioral Health     Alberto Berumen 28 y.o. male MRN: [id number]   Unit/Bed#: [facility] 383-02 Encounter: [id number]    Documentation, nursing notes, medication reconciliation, labs, and vitals reviewed. Patient was seen for continuing care and reviewed with treatment team. No acute events over the past 24 hours.  Per nursing report, remains isolative at times, can be more social at times for brief intervals.  Has been calm and cooperative and pleasant.  Continues to report auditory hallucinations.    No medication changes over the past 24 hours. Continues to tolerate current medications with no adverse effects.     On evaluation today, he is again seen in his room and resting in bed.  He is aware of ECT to start on Friday and then continue next week Monday Wednesday Friday.  He is agreeable to same.  Continues to report no change in his auditory hallucinations threatening him and his family.  Continues to minimize depressive symptoms.  No suicidal ideation, plan, or intent.  Ongoing perceptual disturbances and does not exhibit any symptoms of aimee on evaluation.    Psychiatric ROS:  Behavior over the last 24 hours: unchanged  Sleep: hypersomnia  Appetite: fair  Medication side effects: No   ROS: no complaints, all other systems are negative    Mental Status Evaluation:    Appearance:  marginal hygiene   Behavior:  cooperative, guarded   Speech:  scant   Mood:  depressed   Affect:  blunted   Thought Process:  coherent   Associations: intact associations   Thought Content:  Caodaism preoccupation   Perceptual Disturbances: auditory hallucinations threatening him and his family and telling him they will go to hell   Risk Potential: Suicidal ideation - None  Homicidal ideation - None  Potential for aggression - No   Sensorium:  oriented to person, place, and time/date   Memory:  recent and remote memory grossly intact   Consciousness:  alert and awake   Attention/Concentration: decreased concentration and  decreased attention span   Insight:  limited   Judgment: limited   Gait/Station: normal gait/station, normal balance   Motor Activity: no abnormal movements     Vital signs in last 24 hours:    Temp:  [97.2 °F (36.2 °C)-97.6 °F (36.4 °C)] 97.6 °F (36.4 °C)  HR:  [] 96  Resp:  [16-18] 17  BP: (109-137)/(59-88) 127/86    Laboratory results: I have personally reviewed all pertinent laboratory/tests results    Results from the past 24 hours: No results found for this or any previous visit (from the past 24 hour(s)).    Suicide/Homicide Risk Assessment:    Risk of Harm to Self:   Current Specific Risk Factors include: current depressive symptoms, current psychotic symptoms  Protective Factors: no current suicidal ideation, ability to communicate with staff on the unit, able to contract for safety on the unit, taking medications as ordered on the unit  Based on today's assessment, Alberto presents the following risk of harm to self: minimal    Risk of Harm to Others:  Current Specific Risk Factors include: current psychotic symptoms  Protective Factors: no current homicidal ideation, psychotic symptoms are less prominent  Based on today's assessment, Alberto presents the following risk of harm to others: minimal    The following interventions are recommended: behavioral checks every 7 minutes, continued hospitalization on locked unit    Progress Toward Goals: minimal improvement, still depressed, still psychotic    Assessment/Plan   Principal Problem:    Schizoaffective disorder, bipolar type (HCC)  Active Problems:    GERD (gastroesophageal reflux disease)    Tobacco abuse    T wave inversion in EKG    Chronic idiopathic constipation    Vitamin B 12 deficiency    Vitamin D deficiency    Acanthosis nigricans    Rash      Recommended Treatment:     Planned medication and treatment changes:    All current active medications have been reviewed  Encourage group therapy, milieu therapy and occupational  therapy  Behavioral Health checks every 7 minutes  Extended acute care referral    Anticipate starting ECT on Friday  second opinion for ECT completed today    Medical clearance for ECT has been done  We will consider further increase in Clozaril dose at the end of the week if he continues to report psychotic symptoms    Monitor CBC, CK, CRP, BNP and troponin, and EKG weekly on Mondays for Clozaril  Continue current medications:    Current Facility-Administered Medications   Medication Dose Route Frequency Provider Last Rate    acetaminophen  650 mg Oral Q6H PRN Yasmin Harvey MD      acetaminophen  650 mg Oral Q4H PRN Yasmin Harvey MD      acetaminophen  975 mg Oral Q6H PRN Yasmin Harvey MD      aluminum-magnesium hydroxide-simethicone  30 mL Oral Q4H PRN Yasmin Harvey MD      atropine  1 drop Sublingual HS HOLLI Anderson      atropine  1 drop Sublingual Daily PRN HOLLI Anderson      haloperidol lactate  2.5 mg Intramuscular Q4H PRN Max 4/day Yasmin Harvey MD      And    LORazepam  1 mg Intramuscular Q4H PRN Max 4/day Yasmin Harvey MD      And    benztropine  0.5 mg Intramuscular Q4H PRN Max 4/day Yasmin Harvey MD      haloperidol lactate  5 mg Intramuscular Q4H PRN Max 4/day Yasmin Harvey MD      And    LORazepam  2 mg Intramuscular Q4H PRN Max 4/day Yasmin Harvey MD      And    benztropine  1 mg Intramuscular Q4H PRN Max 4/day Yasmin Harvey MD      benztropine  1 mg Oral Q4H PRN Max 6/day Yasmin Harvey MD      bisacodyl  10 mg Rectal Daily PRN Yasmin Harvey MD      cloZAPine  400 mg Oral HS HOLLI Anderson      hydrOXYzine HCL  50 mg Oral Q6H PRN Max 4/day Yasmin Harvey MD      Or    diphenhydrAMINE  50 mg Intramuscular Q6H PRN Yasmin Harvey MD      diphenhydrAMINE-zinc acetate   Topical BID PRN Anjel Sagastume MD      escitalopram  20 mg Oral Daily La Flores MD      haloperidol  1 mg Oral Q6H PRN Yasmin Harvey MD      haloperidol  2.5 mg  Oral Q4H PRN Max 4/day Yasmin Harvey MD      haloperidol  5 mg Oral Q4H PRN Max 4/day Yasmin Harvey MD      hydrocortisone   Topical 4x Daily PRN HOLLI Monge      hydrOXYzine HCL  100 mg Oral Q6H PRN Max 4/day Yasmin Harvey MD      Or    LORazepam  2 mg Intramuscular Q6H PRN Yasmin Harvey MD      hydrOXYzine HCL  25 mg Oral Q6H PRN Max 4/day Yasmin Harvey MD      melatonin  3 mg Oral HS Yasmin Harvey MD      metFORMIN  500 mg Oral Daily With Breakfast HOLLI Monge      minocycline  100 mg Oral Q12H KAYLIE HOLLI Monge      mirtazapine  45 mg Oral HS La Flores MD      nicotine polacrilex  4 mg Oral Q2H PRN Yasmin Harvey MD      polyethylene glycol  17 g Oral Daily PRN Yasmin Harvey MD      polyethylene glycol  17 g Oral Daily HOLLI Monge      propranolol  10 mg Oral Q12H [facility] HOLLI Anderson      senna-docusate sodium  1 tablet Oral Daily PRN Yasmin Harvey MD      senna-docusate sodium  2 tablet Oral BID La Flores MD      traZODone  50 mg Oral HS PRN Yasmin Harvey MD      white petrolatum-mineral oil   Topical TID PRN Anjel Sagastume MD       Risks / Benefits of Treatment:    Risks, benefits, and possible side effects of medications explained to patient and patient verbalizes understanding and agreement for treatment.    Counseling / Coordination of Care:    Patient's progress discussed with staff in treatment team meeting.  Medications, treatment progress and treatment plan reviewed with patient.    HOLLI Anderson 02/21/24

## 2024-02-21 NOTE — NURSING NOTE
Pt denies SI, HI and VH but continues to report AH. Pt has no complaints or concerns. Pt is isolative to self and room with brief intervals in the milieu for meals and needs. Pt is calm, cooperative and pleasant. Medication and meal compliant.

## 2024-02-21 NOTE — PROGRESS NOTES
02/21/24 1604   Team Meeting   Meeting Type Daily Rounds   Team Members Present   Team Members Present Physician;Nurse;   Physician Team Member Mark   Nursing Team Member Giancarlo   Care Management Team Member Miguelito   Patient/Family Present   Patient Present No   Patient's Family Present No     Patient currently holds 201 status. Patient continues to report AH. Patient is compliant with medications and meals. Patient to continue on all current scheduled medications. Patient to start ECT treatment Friday. Patient discharge date and time is to be determined.

## 2024-02-21 NOTE — NURSING NOTE
Pt calm and cooperative. Denies SI, HI, VH, and pain. Reports ongoing AH that are manageable. Compliant with medication and snack. Denies unmet needs/ concerns at this time.

## 2024-02-22 PROCEDURE — 99232 SBSQ HOSP IP/OBS MODERATE 35: CPT | Performed by: PSYCHIATRY & NEUROLOGY

## 2024-02-22 RX ADMIN — SENNOSIDES AND DOCUSATE SODIUM 2 TABLET: 8.6; 5 TABLET ORAL at 17:19

## 2024-02-22 RX ADMIN — MINOCYCLINE HYDROCHLORIDE 100 MG: 100 CAPSULE ORAL at 08:16

## 2024-02-22 RX ADMIN — NICOTINE POLACRILEX 4 MG: 4 GUM, CHEWING ORAL at 13:16

## 2024-02-22 RX ADMIN — CLOZAPINE 400 MG: 200 TABLET ORAL at 21:04

## 2024-02-22 RX ADMIN — NICOTINE POLACRILEX 4 MG: 4 GUM, CHEWING ORAL at 20:57

## 2024-02-22 RX ADMIN — SENNOSIDES AND DOCUSATE SODIUM 2 TABLET: 8.6; 5 TABLET ORAL at 08:17

## 2024-02-22 RX ADMIN — NICOTINE POLACRILEX 4 MG: 4 GUM, CHEWING ORAL at 18:09

## 2024-02-22 RX ADMIN — PROPRANOLOL HYDROCHLORIDE 10 MG: 10 TABLET ORAL at 08:16

## 2024-02-22 RX ADMIN — MELATONIN TAB 3 MG 3 MG: 3 TAB at 21:05

## 2024-02-22 RX ADMIN — PROPRANOLOL HYDROCHLORIDE 10 MG: 10 TABLET ORAL at 21:05

## 2024-02-22 RX ADMIN — ESCITALOPRAM OXALATE 20 MG: 10 TABLET ORAL at 08:17

## 2024-02-22 RX ADMIN — POLYETHYLENE GLYCOL 3350 17 G: 17 POWDER, FOR SOLUTION ORAL at 08:16

## 2024-02-22 RX ADMIN — MIRTAZAPINE 45 MG: 30 TABLET, FILM COATED ORAL at 21:05

## 2024-02-22 RX ADMIN — MINOCYCLINE HYDROCHLORIDE 100 MG: 100 CAPSULE ORAL at 21:05

## 2024-02-22 RX ADMIN — ATROPINE SULFATE 1 DROP: 10 SOLUTION/ DROPS OPHTHALMIC at 21:08

## 2024-02-22 RX ADMIN — METFORMIN HYDROCHLORIDE 500 MG: 500 TABLET ORAL at 08:17

## 2024-02-22 RX ADMIN — NICOTINE POLACRILEX 4 MG: 4 GUM, CHEWING ORAL at 15:49

## 2024-02-22 NOTE — PROGRESS NOTES
Progress Note - Behavioral Health     Alberto Berumen 28 y.o. male MRN: [id number]   Unit/Bed#: [facility] 383-02 Encounter: [id number]    Documentation, nursing notes, medication reconciliation, labs, and vitals reviewed. Patient was seen for continuing care and reviewed with treatment team. No acute events over the past 24 hours.  Per nursing report, continues to be isolative to his room, but pleasant and cooperative, and ongoing auditory hallucinations with no improvement.    No medication changes over the past 24 hours. Continues to tolerate current medications with no adverse effects.  He is scheduled to start ECT tomorrow.      On evaluation today, he is aware of plan to start ECT tomorrow.  Some anxiety.  Continues to report auditory hallucinations threatening him and his family.  Continues to present with poor motivation and low energy and minimally verbal, has been less active on the unit.  No suicidal ideation, plan, or intent.  Ongoing perceptual disturbances and does not exhibit any symptoms of aimee on evaluation.    Psychiatric ROS:  Behavior over the last 24 hours: regressed  Sleep: hypersomnia  Appetite: fair  Medication side effects: No   ROS: no complaints, all other systems are negative    Mental Status Evaluation:    Appearance:  marginal hygiene   Behavior:  cooperative, guarded   Speech:  scant   Mood:  depressed   Affect:  constricted   Thought Process:  decreased rate of thoughts   Associations: concrete associations   Thought Content:  some paranoia   Perceptual Disturbances: auditory hallucinations   Risk Potential: Suicidal ideation - None  Homicidal ideation - None  Potential for aggression - No   Sensorium:  oriented to person, place, and time/date   Memory:  recent memory grossly intact   Consciousness:  alert and awake   Attention/Concentration: attention span and concentration are age appropriate   Insight:  limited   Judgment: limited   Gait/Station: normal gait/station, normal balance    Motor Activity: no abnormal movements     Vital signs in last 24 hours:    Temp:  [97.8 °F (36.6 °C)-97.9 °F (36.6 °C)] 97.8 °F (36.6 °C)  HR:  [] 101  Resp:  [18] 18  BP: (129-154)/(77-85) 129/77    Laboratory results: I have personally reviewed all pertinent laboratory/tests results    Results from the past 24 hours: No results found for this or any previous visit (from the past 24 hour(s)).    Suicide/Homicide Risk Assessment:    Risk of Harm to Self:   Current Specific Risk Factors include: current depressive symptoms, current psychotic symptoms  Protective Factors: no current suicidal ideation  Based on today's assessment, Alberto presents the following risk of harm to self: minimal    Risk of Harm to Others:  Current Specific Risk Factors include: current psychotic symptoms  Protective Factors: no current homicidal ideation, able to communicate with staff on the unit, mood is stabilizing, psychotic symptoms are less prominent, compliant with medications on the unit as ordered  Based on today's assessment, Alberto presents the following risk of harm to others: minimal    The following interventions are recommended: behavioral checks every 7 minutes, continued hospitalization on locked unit    Progress Toward Goals: no significant improvement    Assessment/Plan   Principal Problem:    Schizoaffective disorder, bipolar type (HCC)  Active Problems:    GERD (gastroesophageal reflux disease)    Tobacco abuse    T wave inversion in EKG    Chronic idiopathic constipation    Vitamin B 12 deficiency    Vitamin D deficiency    Acanthosis nigricans    Rash      Recommended Treatment:     Planned medication and treatment changes:    All current active medications have been reviewed  Encourage group therapy, milieu therapy and occupational therapy  Behavioral Health checks every 7 minutes  ECT #1 to start tomorrow    Weekly monitoring of CBC, CK, CRP, troponin, BNP, EKG every Monday for Clozaril  monitoring    Continue all other medications:    Current Facility-Administered Medications   Medication Dose Route Frequency Provider Last Rate    acetaminophen  650 mg Oral Q6H PRN Yasmin Harvey MD      acetaminophen  650 mg Oral Q4H PRN Yasmin Harvey MD      acetaminophen  975 mg Oral Q6H PRN Yasmin Harvey MD      aluminum-magnesium hydroxide-simethicone  30 mL Oral Q4H PRN Yasmin Harvey MD      atropine  1 drop Sublingual HS HOLLI Anderson      atropine  1 drop Sublingual Daily PRN HOLLI Anderson      haloperidol lactate  2.5 mg Intramuscular Q4H PRN Max 4/day Yasmin Harvey MD      And    LORazepam  1 mg Intramuscular Q4H PRN Max 4/day Yasmin Harvey MD      And    benztropine  0.5 mg Intramuscular Q4H PRN Max 4/day Yasmin Harvey MD      haloperidol lactate  5 mg Intramuscular Q4H PRN Max 4/day Yasmin Harvey MD      And    LORazepam  2 mg Intramuscular Q4H PRN Max 4/day Yasmin Harvey MD      And    benztropine  1 mg Intramuscular Q4H PRN Max 4/day Yasmin Harvey MD      benztropine  1 mg Oral Q4H PRN Max 6/day Yasmin Harvey MD      bisacodyl  10 mg Rectal Daily PRN Yasmin Harvey MD      cloZAPine  400 mg Oral HS HOLLI Anderson      hydrOXYzine HCL  50 mg Oral Q6H PRN Max 4/day Yasmin Harvey MD      Or    diphenhydrAMINE  50 mg Intramuscular Q6H PRN Yasmin Harvey MD      diphenhydrAMINE-zinc acetate   Topical BID PRN Anjel Sagastume MD      escitalopram  20 mg Oral Daily La lFores MD      haloperidol  1 mg Oral Q6H PRN Yasmin Harvey MD      haloperidol  2.5 mg Oral Q4H PRN Max 4/day Yasmin Harvey MD      haloperidol  5 mg Oral Q4H PRN Max 4/day Yasmin Harvey MD      hydrocortisone   Topical 4x Daily PRN HOLLI Monge      hydrOXYzine HCL  100 mg Oral Q6H PRN Max 4/day Yamsin Harvey MD      Or    LORazepam  2 mg Intramuscular Q6H PRN Yasmin Harvey MD      hydrOXYzine HCL  25 mg Oral Q6H PRN Max 4/day Yasmin JACKMAN  MD Candice      melatonin  3 mg Oral HS Yasmin Harvey MD      metFORMIN  500 mg Oral Daily With Breakfast HOLLI Monge      minocycline  100 mg Oral Q12H [facility] HOLLI Monge      mirtazapine  45 mg Oral HS La Flores MD      nicotine polacrilex  4 mg Oral Q2H PRN Yasmin Harvey MD      polyethylene glycol  17 g Oral Daily PRN Yasmin Harvey MD      polyethylene glycol  17 g Oral Daily HOLLI Monge      propranolol  10 mg Oral Q12H [facility] HOLLI Anderson      senna-docusate sodium  1 tablet Oral Daily PRN Yasmin Harvey MD      senna-docusate sodium  2 tablet Oral BID La Flores MD      traZODone  50 mg Oral HS PRN Yasmin Harvey MD      white petrolatum-mineral oil   Topical TID PRN Anjel Sagastume MD       Risks / Benefits of Treatment:    Risks, benefits, and possible side effects of medications explained to patient and patient verbalizes understanding and agreement for treatment.  Discussed risks and benefits of Electroconvulsive Treatment with patient including risks of anesthesia and memory loss. Alberto verbalized understanding and agreed for ECT.    Counseling / Coordination of Care:    Patient's progress discussed with staff in treatment team meeting.  Medications, treatment progress and treatment plan reviewed with patient.    HOLLI Anderson 02/22/24

## 2024-02-22 NOTE — NURSING NOTE
Pt is calm and cooperative. Social and visible with peers. Reports ongoing AH that are manageable. Denies SI, HI, VH, and pain. Compliant with medication and snack.

## 2024-02-22 NOTE — NURSING NOTE
Pt denies SI, HI, and VH, pt continues to report ongoing AH. Pt is isolative to self and room. Pt is calm, cooperative and pleasant. Pt has no complaints or concerns. Medication and meal compliant.

## 2024-02-23 PROCEDURE — 99232 SBSQ HOSP IP/OBS MODERATE 35: CPT | Performed by: PSYCHIATRY & NEUROLOGY

## 2024-02-23 RX ADMIN — POLYETHYLENE GLYCOL 3350 17 G: 17 POWDER, FOR SOLUTION ORAL at 12:58

## 2024-02-23 RX ADMIN — MELATONIN TAB 3 MG 3 MG: 3 TAB at 21:23

## 2024-02-23 RX ADMIN — SENNOSIDES AND DOCUSATE SODIUM 2 TABLET: 8.6; 5 TABLET ORAL at 17:15

## 2024-02-23 RX ADMIN — MIRTAZAPINE 45 MG: 30 TABLET, FILM COATED ORAL at 21:22

## 2024-02-23 RX ADMIN — NICOTINE POLACRILEX 4 MG: 4 GUM, CHEWING ORAL at 20:28

## 2024-02-23 RX ADMIN — MINOCYCLINE HYDROCHLORIDE 100 MG: 100 CAPSULE ORAL at 21:22

## 2024-02-23 RX ADMIN — SENNOSIDES AND DOCUSATE SODIUM 2 TABLET: 8.6; 5 TABLET ORAL at 10:17

## 2024-02-23 RX ADMIN — NICOTINE POLACRILEX 4 MG: 4 GUM, CHEWING ORAL at 12:58

## 2024-02-23 RX ADMIN — CLOZAPINE 425 MG: 25 TABLET ORAL at 21:22

## 2024-02-23 RX ADMIN — ESCITALOPRAM OXALATE 20 MG: 10 TABLET ORAL at 10:17

## 2024-02-23 RX ADMIN — PROPRANOLOL HYDROCHLORIDE 10 MG: 10 TABLET ORAL at 10:17

## 2024-02-23 RX ADMIN — PROPRANOLOL HYDROCHLORIDE 10 MG: 10 TABLET ORAL at 21:23

## 2024-02-23 RX ADMIN — MINOCYCLINE HYDROCHLORIDE 100 MG: 100 CAPSULE ORAL at 10:17

## 2024-02-23 RX ADMIN — ATROPINE SULFATE 1 DROP: 10 SOLUTION/ DROPS OPHTHALMIC at 21:23

## 2024-02-23 RX ADMIN — NICOTINE POLACRILEX 4 MG: 4 GUM, CHEWING ORAL at 17:28

## 2024-02-23 NOTE — PROGRESS NOTES
"Progress Note - Behavioral Health     Alberto Berumen 28 y.o. male MRN: [id number]   Unit/Bed#: -02 Encounter: [id number]    Behavior over the last 24 hours: unchanged.     Alberto is a 28-year-old male with schizoaffective disorder, bipolar type who is seen today lying in bed, calm and scant in conversation. He was supposed to have ECT today, but his sisters' called last night reporting their concerns for their brother getting ECT done. Alberto refused ECT this morning. Future meeting with Alberto and his family with a  and provider will be held to reassess ECT.     During interview, Alberto was responding with his eyes closed and reported that he was tired. He is isolative to room and withdrawn. He was scant in conversation, only replying to questions with concrete or one word responses. He denies anxiety and depression when asked. He appears to have low motivation and energy on presentation. He continues to report auditory hallucinations, but reports, \"they're less now.\" He denies visual hallucinations, and does not appear to be responding to internal stimuli. He denies suicidal and homicidal ideations. No overt delusions or symptoms of aimee on evaluation. No prns were given in the last 24 hours, aside from nicorette. His Clozaril will be increased to 425 mg at bedtime to help with psychotic symptoms.     Sleep: hypersomnia  Appetite: normal  Medication side effects: No   ROS: no complaints, all other systems are negative    Mental Status Evaluation:    Appearance:  disheveled, marginal hygiene, looks stated age   Behavior:  calm, guarded   Speech:  slow, scant, soft   Mood:  depressed   Affect:  constricted   Thought Process:  decreased rate of thoughts   Associations: concrete associations   Thought Content:  some paranoia   Perceptual Disturbances: auditory hallucinations of \"voices\"   Risk Potential: Suicidal ideation - None  Homicidal ideation - None  Potential for aggression - No " "  Sensorium:  oriented to person, place, and time/date   Memory:  recent and remote memory grossly intact   Consciousness:  alert and awake   Attention/Concentration: attention span and concentration are age appropriate   Insight:  limited   Judgment: limited   Gait/Station: normal gait/station   Motor Activity: no abnormal movements     Vital signs in last 24 hours:    Temp:  [97.2 °F (36.2 °C)] 97.2 °F (36.2 °C)  HR:  [] 106  Resp:  [14] 14  BP: (121-123)/(76-84) 123/76    Laboratory results: I have personally reviewed all pertinent laboratory/tests results    Labs in last 72 hours: No results for input(s): \"WBC\", \"RBC\", \"HGB\", \"HCT\", \"PLT\", \"RDW\", \"TOTANEUTABS\", \"NEUTROABS\", \"SODIUM\", \"K\", \"CL\", \"CO2\", \"BUN\", \"CREATININE\", \"GLUC\", \"CALCIUM\", \"AST\", \"ALT\", \"ALKPHOS\", \"TP\", \"ALB\", \"TBILI\", \"CHOLESTEROL\", \"HDL\", \"TRIG\", \"LDLCALC\", \"VALPROICTOT\", \"CARBAMAZEPIN\", \"LITHIUM\", \"AMMONIA\", \"ZLY5VENEBUUV\", \"FREET4\", \"T3FREE\", \"PREGTESTUR\", \"PREGSERUM\", \"HCG\", \"HCGQUANT\", \"SYPHILISAB\" in the last 72 hours.    Progress Toward Goals: No significant improvement.     Assessment/Plan   Principal Problem:    Schizoaffective disorder, bipolar type (HCC)  Active Problems:    GERD (gastroesophageal reflux disease)    Tobacco abuse    T wave inversion in EKG    Chronic idiopathic constipation    Vitamin B 12 deficiency    Vitamin D deficiency    Acanthosis nigricans    Rash      Recommended Treatment:     Planned medication and treatment changes:    All current active medications have been reviewed  Encourage group therapy, milieu therapy and occupational therapy  Behavioral Health checks every 7 minutes  Increase Clozaril to 425mg with weekly monitoring of CBC/diff, CPK, C-reactive protein, BNP, troponin, and ECG.  Family meeting planned with .  Continue all other medications:    Current Facility-Administered Medications   Medication Dose Route Frequency Provider Last Rate    acetaminophen  650 mg Oral Q6H PRN Yasmin " GASPER Harvey MD      acetaminophen  650 mg Oral Q4H PRN Yasmin Harvey MD      acetaminophen  975 mg Oral Q6H PRN Yasmin Harvey MD      aluminum-magnesium hydroxide-simethicone  30 mL Oral Q4H PRN Yasmin Harvey MD      atropine  1 drop Sublingual HS Prisca Christo, CRNP      atropine  1 drop Sublingual Daily PRN Prisca Villafuerte, CRJENNIE      haloperidol lactate  2.5 mg Intramuscular Q4H PRN Max 4/day Yasmin Harvey MD      And    LORazepam  1 mg Intramuscular Q4H PRN Max 4/day Yasmin Harvey MD      And    benztropine  0.5 mg Intramuscular Q4H PRN Max 4/day Yasmin Harvey MD      haloperidol lactate  5 mg Intramuscular Q4H PRN Max 4/day Yasmin Harvey MD      And    LORazepam  2 mg Intramuscular Q4H PRN Max 4/day Yasmin Harvey MD      And    benztropine  1 mg Intramuscular Q4H PRN Max 4/day Yasmin Harvey MD      benztropine  1 mg Oral Q4H PRN Max 6/day Yasmin Harvey MD      bisacodyl  10 mg Rectal Daily PRN Yasmin Harvey MD      cloZAPine  425 mg Oral HS Prisca Villafuerte, HOLLI      hydrOXYzine HCL  50 mg Oral Q6H PRN Max 4/day Yasmin Harvey MD      Or    diphenhydrAMINE  50 mg Intramuscular Q6H PRN Yasmin Harvey MD      diphenhydrAMINE-zinc acetate   Topical BID PRN Anjel Sagastume MD      escitalopram  20 mg Oral Daily La Flores MD      haloperidol  1 mg Oral Q6H PRN Yasmin Harvey MD      haloperidol  2.5 mg Oral Q4H PRN Max 4/day Yasmin Harvey MD      haloperidol  5 mg Oral Q4H PRN Max 4/day Yasmin Harvey MD      hydrocortisone   Topical 4x Daily PRN Eileen Jensen, HOLLI      hydrOXYzine HCL  100 mg Oral Q6H PRN Max 4/day Yasmin Harvey MD      Or    LORazepam  2 mg Intramuscular Q6H PRN Yasmin Harvey MD      hydrOXYzine HCL  25 mg Oral Q6H PRN Max 4/day Yasmin Harvey MD      melatonin  3 mg Oral HS Yasmin Harvey MD      metFORMIN  500 mg Oral Daily With Breakfast HOLLI Monge      minocycline  100 mg Oral Q12H KAYLIE Arellano  HOLLI Higuera      mirtazapine  45 mg Oral HS La Flores MD      nicotine polacrilex  4 mg Oral Q2H PRN Yasmin Harvey MD      polyethylene glycol  17 g Oral Daily PRN Yasmin Harvey MD      polyethylene glycol  17 g Oral Daily Eileen HOLLI Higuera      propranolol  10 mg Oral Q12H [facility] HOLLI Anderson      senna-docusate sodium  1 tablet Oral Daily PRN Yasmin Harvey MD      senna-docusate sodium  2 tablet Oral BID La Flores MD      traZODone  50 mg Oral HS PRN Yasmin Harvey MD      white petrolatum-mineral oil   Topical TID PRN Anjel Sagastume MD       Risks / Benefits of Treatment:    Risks, benefits, and possible side effects of medications explained to patient and patient verbalizes understanding and agreement for treatment.    Counseling / Coordination of Care:    Patient's progress discussed with staff in treatment team meeting.  Medications, treatment progress and treatment plan reviewed with patient.  Medication changes discussed with patient.  Medication education provided to patient.    Akira Kunz PA-C 02/23/24

## 2024-02-23 NOTE — NURSING NOTE
PT refused ECT treatment this morning after being informed of sisters' concerns and request to speak with Provider and  before proceeding with treatment.      0535:  Family made aware of PT's discussion to hold -on until further decision is reached between PT, Provider and family.

## 2024-02-23 NOTE — NURSING NOTE
Patient denies SI, HI, and VHs. Patient continues to report AH of voices telling him him and his family will die. Denies anxiety, reports mild depression. Patient slept through breakfast, declined morning metformin but was complaint with all other medications. Patient ate lunch in common area near peers. Visible on the unit and social with peers. He denies any needs at this time.

## 2024-02-23 NOTE — NURSING NOTE
0145: PT's sister called the unit requesting to speak with the DrFreddie Concerning the upcoming scheduled ECT, asking if the treatment can be put on hold as they (family) are uncomfortable with the treatment also stating that PT doesn't have full understanding of treatment outcome. Advise to give writer time to reached out to Provider for a discussion since it's so late in the night, Provider not in the building. Sisters, Marleny and Ginger requesting to have brother(PT) and Provider or case manger speak with them before committing treatment. Supervisor made aware of this call and advise to speak with one of unit clinician coordinator for further advise.

## 2024-02-23 NOTE — PROGRESS NOTES
02/23/24 1427   Team Meeting   Meeting Type Daily Rounds   Team Members Present   Team Members Present Physician;Nurse;   Physician Team Member Mark   Nursing Team Member Jaci   Care Management Team Member Quinn   Patient/Family Present   Patient Present No   Patient's Family Present No   OTHER   Team Meeting - Additional Comments 201. Denies SI/HI/VH/AH.  Refused ECT this morning. Attended 1/4 groups.  Plan to increase Clozaril dose and scheduled Family meeting with sisters.  D/C TBD.

## 2024-02-23 NOTE — NURSING NOTE
PT observed in the unit, cooperative and calm on approach. PT denies SI/HI/VH/AH, reports anxious concerning ECT treatment tomorrow. Encouraged relaxation coping skills, provide education on possible side effect like HA, slight memory loss, fatigue and confusion, PT verbalized understanding.  Compliant with HS meds, no further unmet needs at this time.

## 2024-02-24 PROCEDURE — 99232 SBSQ HOSP IP/OBS MODERATE 35: CPT | Performed by: STUDENT IN AN ORGANIZED HEALTH CARE EDUCATION/TRAINING PROGRAM

## 2024-02-24 RX ADMIN — NICOTINE POLACRILEX 4 MG: 4 GUM, CHEWING ORAL at 17:47

## 2024-02-24 RX ADMIN — NICOTINE POLACRILEX 4 MG: 4 GUM, CHEWING ORAL at 21:33

## 2024-02-24 RX ADMIN — SENNOSIDES AND DOCUSATE SODIUM 2 TABLET: 8.6; 5 TABLET ORAL at 17:13

## 2024-02-24 RX ADMIN — ATROPINE SULFATE 1 DROP: 10 SOLUTION/ DROPS OPHTHALMIC at 21:18

## 2024-02-24 RX ADMIN — PROPRANOLOL HYDROCHLORIDE 10 MG: 10 TABLET ORAL at 09:09

## 2024-02-24 RX ADMIN — PROPRANOLOL HYDROCHLORIDE 10 MG: 10 TABLET ORAL at 21:11

## 2024-02-24 RX ADMIN — NICOTINE POLACRILEX 4 MG: 4 GUM, CHEWING ORAL at 15:19

## 2024-02-24 RX ADMIN — POLYETHYLENE GLYCOL 3350 17 G: 17 POWDER, FOR SOLUTION ORAL at 12:49

## 2024-02-24 RX ADMIN — CLOZAPINE 425 MG: 25 TABLET ORAL at 21:12

## 2024-02-24 RX ADMIN — MELATONIN TAB 3 MG 3 MG: 3 TAB at 21:12

## 2024-02-24 RX ADMIN — SENNOSIDES AND DOCUSATE SODIUM 2 TABLET: 8.6; 5 TABLET ORAL at 09:09

## 2024-02-24 RX ADMIN — ESCITALOPRAM OXALATE 20 MG: 10 TABLET ORAL at 09:09

## 2024-02-24 RX ADMIN — MINOCYCLINE HYDROCHLORIDE 100 MG: 100 CAPSULE ORAL at 21:11

## 2024-02-24 RX ADMIN — MINOCYCLINE HYDROCHLORIDE 100 MG: 100 CAPSULE ORAL at 09:09

## 2024-02-24 RX ADMIN — MIRTAZAPINE 45 MG: 30 TABLET, FILM COATED ORAL at 21:11

## 2024-02-24 RX ADMIN — NICOTINE POLACRILEX 4 MG: 4 GUM, CHEWING ORAL at 12:59

## 2024-02-24 NOTE — NURSING NOTE
Pt denies SI/HI/VH. Pt reports AH of the same voices stating that they are going to harm him or his family. Pt states AH are mild at this time and no PRN is needed. Pt is calm and pleasant.  Present in dayroom and milieu. Social with peers. Medication compliant. No further concerns as of present. Plan of care ongoing.

## 2024-02-24 NOTE — PLAN OF CARE
Problem: Ineffective Coping  Goal: Participates in unit activities  Description: Interventions:  - Provide therapeutic environment   - Provide required programming   - Redirect inappropriate behaviors   Outcome: Not Progressing     It appears patient has only been attending one unit group/activity per day for the past several weeks.

## 2024-02-24 NOTE — NURSING NOTE
Pt denied SI, HI and VH's.  States he has Ah's and stated they told him they are going to hurt him and his family.  Patient skipped breakfast and declined morning metformin.  Ate lunch in commons with peers.  Denies anxiety and stated he has a little bit of depression.  Appears depressed.  Denies any needs at this time.

## 2024-02-24 NOTE — PLAN OF CARE
Problem: PSYCHOSIS  Goal: Will report no hallucinations or delusions  Description: Interventions:  - Administer medication as  ordered  - Every waking shifts and PRN assess for the presence of hallucinations and or delusions  - Assist with reality testing to support increasing orientation  - Assess if patient's hallucinations or delusions are encouraging self-harm or harm to others and intervene as appropriate  Outcome: Progressing     Problem: ANXIETY  Goal: Will report anxiety at manageable levels  Description: INTERVENTIONS:  - Administer medication as ordered  - Teach and encourage coping skills  - Provide emotional support  - Assess patient/family for anxiety and ability to cope  Outcome: Progressing     Problem: Depression  Goal: Treatment Goal: Demonstrate behavioral control of depressive symptoms, verbalize feelings of improved mood/affect, and adopt new coping skills prior to discharge  Outcome: Progressing  Goal: Verbalize thoughts and feelings  Description: Interventions:  - Assess and re-assess patient's level of risk   - Engage patient in 1:1 interactions, daily, for a minimum of 15 minutes   - Encourage patient to express feelings, fears, frustrations, hopes   Outcome: Progressing  Goal: Refrain from isolation  Description: Interventions:  - Develop a trusting relationship   - Encourage socialization   Outcome: Progressing  Goal: Refrain from self-neglect  Outcome: Progressing     Problem: DISCHARGE PLANNING  Goal: Discharge to home or other facility with appropriate resources  Description: INTERVENTIONS:  - Identify barriers to discharge w/patient and caregiver  - Arrange for needed discharge resources and transportation as appropriate  - Identify discharge learning needs (meds, wound care, etc.)  - Arrange for interpretive services to assist at discharge as needed  - Refer to Case Management Department for coordinating discharge planning if the patient needs post-hospital services based on  physician/advanced practitioner order or complex needs related to functional status, cognitive ability, or social support system  Outcome: Progressing     Problem: Electroconvulsive therapy (ECT)  Goal: Treatment Goal: Demonstrate a reduction of confusion and improved orientation to person, place, time and/or situation upon discharge, according to optimum baseline/ability  Outcome: Progressing  Goal: Verbalizes/displays adequate comfort level or baseline comfort level  Description: Interventions:  - Encourage patient to monitor pain and request assistance  - Assess pain using appropriate pain scale  - Administer analgesics based on type and severity of pain and evaluate response  - Implement non-pharmacological measures as appropriate and evaluate response  - Consider cultural and social influences on pain and pain management  - Notify physician/advanced practitioner if interventions unsuccessful or patient reports new pain  Outcome: Progressing  Goal: Achieves stable or improved neurological status  Description: INTERVENTIONS  - Monitor and report changes in neurological status  - Monitor vital signs such as temperature, blood pressure, glucose, and any other labs ordered   - Initiate measures to prevent increased intracranial pressure  - Monitor for seizure activity and implement precautions if appropriate      Outcome: Progressing  Goal: Achieves maximal functionality and self care  Description: INTERVENTIONS  - Monitor swallowing and airway patency with patient fatigue and changes in neurological status  - Encourage and assist patient to increase activity and self care.   - Encourage visually impaired, hearing impaired and aphasic patients to use assistive/communication devices  Outcome: Progressing  Goal: Maintain or return mobility to safest level of function  Description: INTERVENTIONS:  - Assess patient's ability to carry out ADLs; assess patient's baseline for ADL function and identify physical deficits  which impact ability to perform ADLs (bathing, care of mouth/teeth, toileting, grooming, dressing, etc.)  - Assess/evaluate cause of self-care deficits   - Assess range of motion  - Assess patient's mobility  - Assess patient's need for assistive devices and provide as appropriate  - Encourage maximum independence but intervene and supervise when necessary  - Involve family in performance of ADLs  - Assess for home care needs following discharge   - Consider OT consult to assist with ADL evaluation and planning for discharge  - Provide patient education as appropriate  Outcome: Progressing  Goal: Absence of urinary retention  Description: INTERVENTIONS:  - Assess patient’s ability to void and empty bladder  - Monitor I/O  - Bladder scan as needed  - Discuss with physician/AP medications to alleviate retention as needed  - Discuss catheterization for long term situations as appropriate  Outcome: Progressing  Goal: Minimal or absence of nausea and/or vomiting  Description: INTERVENTIONS:  - Administer IV fluids if ordered to ensure adequate hydration  - Maintain NPO status until nausea and vomiting are resolved  - Nasogastric tube if ordered  - Administer ordered antiemetic medications as needed  - Provide nonpharmacologic comfort measures as appropriate  - Advance diet as tolerated, if ordered  - Consider nutrition services referral to assist patient with adequate nutrition and appropriate food choices  Outcome: Progressing  Goal: Maintains adequate nutritional intake  Description: INTERVENTIONS:  - Monitor percentage of each meal consumed  - Identify factors contributing to decreased intake, treat as appropriate  - Assist with meals as needed  - Monitor I&O, weight, and lab values if indicated  - Obtain nutrition services referral as needed  Outcome: Progressing

## 2024-02-24 NOTE — PROGRESS NOTES
Progress Note - Behavioral Health   Alberto Berumen 28 y.o. male MRN: [id number]  Unit/Bed#: U 383-02 Encounter: [id number]    Assessment/Plan   Principal Problem:    Schizoaffective disorder, bipolar type (HCC)  Active Problems:    GERD (gastroesophageal reflux disease)    Tobacco abuse    T wave inversion in EKG    Chronic idiopathic constipation    Vitamin B 12 deficiency    Vitamin D deficiency    Acanthosis nigricans    Rash    Recommended Treatment:   Continue current medications.  Group, individual, milieu therapy.  Continue behavioral health checks per routine.  Medical management per SLIM.  Discharge planning    ----------------------------------------      Subjective: Per nursing report patient continues to endorse auditory hallucinations.  Noted to be visible in the milieu at times, interacting socially with peers.  Compliant with medications and meals as scheduled.    On examination patient seen in his bedroom, reports that he continues to experience auditory hallucinations that his family is going to be harmed or killed.  Maintains limited eye contact and appears blunted in terms of his affect.  He reports that he tolerated the increase in Clozaril well, no major issues or concerns at this time.  States that he is looking forward to a family meeting in the upcoming days to discuss potential for ECT.  Denies any SI, HI, AVH today.    Behavior over the last 24 hours: Unchanged  Sleep: hypersomnia  Appetite: normal  Medication side effects: No  ROS: Negative, no complaints otherwise    Mental Status Evaluation:  Appearance:  marginal hygiene   Behavior:  cooperative, guarded   Speech:  slow, soft   Mood:  dysphoric   Affect:  blunted   Thought Process:  slowing of thoughts, negative thinking   Associations: intact associations   Thought Content:  negative thinking, ruminations   Perceptual Disturbances: Derogatory auditory hallucinations   Risk Potential: Suicidal ideation - None at present  Homicidal  ideation - None at present  Potential for aggression - No   Sensorium:  oriented to person, place, and time/date   Memory:  recent and remote memory grossly intact   Consciousness:  alert and awake   Attention/Concentration: attention span and concentration appear shorter than expected for age   Insight:  limited   Judgment: limited   Gait/Station: normal gait/station   Motor Activity: no abnormal movements     Medications: all current active meds have been reviewed and continue current psychiatric medications.  Current Facility-Administered Medications   Medication Dose Route Frequency Provider Last Rate    acetaminophen  650 mg Oral Q6H PRN Yasmin Harvey MD      acetaminophen  650 mg Oral Q4H PRN Yasmin Harvey MD      acetaminophen  975 mg Oral Q6H PRN Yasmin Harvey MD      aluminum-magnesium hydroxide-simethicone  30 mL Oral Q4H PRN Yasmin Harvey MD      atropine  1 drop Sublingual HS HOLLI Anderson      atropine  1 drop Sublingual Daily PRN HOLLI Anderson      haloperidol lactate  2.5 mg Intramuscular Q4H PRN Max 4/day Yasmin Harvey MD      And    LORazepam  1 mg Intramuscular Q4H PRN Max 4/day Yasmin Harvey MD      And    benztropine  0.5 mg Intramuscular Q4H PRN Max 4/day Yasmin Harvey MD      haloperidol lactate  5 mg Intramuscular Q4H PRN Max 4/day Yasmin Harvey MD      And    LORazepam  2 mg Intramuscular Q4H PRN Max 4/day Yasmin Harvey MD      And    benztropine  1 mg Intramuscular Q4H PRN Max 4/day Yasmin Harvey MD      benztropine  1 mg Oral Q4H PRN Max 6/day Yasmin Harvey MD      bisacodyl  10 mg Rectal Daily PRN Yasmin Harvey MD      cloZAPine  425 mg Oral HS HOLLI Anderson      hydrOXYzine HCL  50 mg Oral Q6H PRN Max 4/day Yasmin Harvey MD      Or    diphenhydrAMINE  50 mg Intramuscular Q6H PRN Yasmin Harvey MD      diphenhydrAMINE-zinc acetate   Topical BID PRN Anjel Sagastume MD      escitalopram  20 mg Oral Daily La Flores MD       haloperidol  1 mg Oral Q6H PRN Yasmin Harvey MD      haloperidol  2.5 mg Oral Q4H PRN Max 4/day Yasmin Harvey MD      haloperidol  5 mg Oral Q4H PRN Max 4/day Yasmin Harvey MD      hydrocortisone   Topical 4x Daily PRN HOLLI Monge      hydrOXYzine HCL  100 mg Oral Q6H PRN Max 4/day Yasmin Harvey MD      Or    LORazepam  2 mg Intramuscular Q6H PRN Yasmin Harvey MD      hydrOXYzine HCL  25 mg Oral Q6H PRN Max 4/day Yasmin Harvey MD      melatonin  3 mg Oral HS Yasmin Harvey MD      metFORMIN  500 mg Oral Daily With Breakfast HOLLI Monge      minocycline  100 mg Oral Q12H KAYLIE HOLLI Monge      mirtazapine  45 mg Oral HS La Flores MD      nicotine polacrilex  4 mg Oral Q2H PRN Yasmin Harvey MD      polyethylene glycol  17 g Oral Daily PRN Yasmin Harvey MD      polyethylene glycol  17 g Oral Daily HOLLI Monge      propranolol  10 mg Oral Q12H [facility] HOLLI Anderson      senna-docusate sodium  1 tablet Oral Daily PRN Yasmin Harvey MD      senna-docusate sodium  2 tablet Oral BID La Flores MD      traZODone  50 mg Oral HS PRN Yasmin Harvey MD      white petrolatum-mineral oil   Topical TID PRN Anjel Sagastume MD         Labs: I have personally reviewed all pertinent laboratory/tests results  Most Recent Labs:   Lab Results   Component Value Date    WBC 8.04 02/19/2024    RBC 5.25 02/19/2024    HGB 12.5 02/19/2024    HCT 41.8 02/19/2024     02/19/2024    RDW 13.2 02/19/2024    NEUTROABS 3.81 02/19/2024    SODIUM 136 01/11/2024    K 4.1 01/11/2024     01/11/2024    CO2 27 01/11/2024    BUN 12 01/11/2024    CREATININE 0.90 01/11/2024    GLUC 97 01/11/2024    CALCIUM 9.4 01/11/2024    AST 23 01/11/2024    ALT 50 01/11/2024    ALKPHOS 76 01/11/2024    TP 7.1 01/11/2024    ALB 4.0 01/11/2024    TBILI 0.43 01/11/2024    CHOLESTEROL 161 01/11/2024    HDL 49 01/11/2024    TRIG 132  01/11/2024    LDLCALC 86 01/11/2024    NONHDLC 112 01/11/2024    LITHIUM 0.61 01/09/2024    [id number] 1.062 11/15/2023    SYPHILISAB Non-reactive 11/18/2023    HGBA1C 5.8 (H) 01/23/2024     01/23/2024       Progress Toward Goals: progressing    Risks / Benefits of Treatment:    Risks, benefits, and possible side effects of medications explained to patient and patient verbalizes understanding and agreement for treatment.    Counseling / Coordination of Care:    Total floor / unit time spent today 25 minutes. Greater than 50% of total time was spent with the patient and / or family counseling and / or coordination of care. A description of counseling / coordination of care:  Patient's progress discussed with staff in treatment team meeting.  Medications, treatment progress and treatment plan reviewed with patient.  Reassurance and supportive therapy provided.  Encouraged participation in milieu and group therapy on the unit.    Bora Amaya MD 02/24/24

## 2024-02-25 PROCEDURE — 99232 SBSQ HOSP IP/OBS MODERATE 35: CPT | Performed by: STUDENT IN AN ORGANIZED HEALTH CARE EDUCATION/TRAINING PROGRAM

## 2024-02-25 RX ADMIN — NICOTINE POLACRILEX 4 MG: 4 GUM, CHEWING ORAL at 21:16

## 2024-02-25 RX ADMIN — ESCITALOPRAM OXALATE 20 MG: 10 TABLET ORAL at 09:07

## 2024-02-25 RX ADMIN — MELATONIN TAB 3 MG 3 MG: 3 TAB at 21:13

## 2024-02-25 RX ADMIN — PROPRANOLOL HYDROCHLORIDE 10 MG: 10 TABLET ORAL at 09:07

## 2024-02-25 RX ADMIN — NICOTINE POLACRILEX 4 MG: 4 GUM, CHEWING ORAL at 18:01

## 2024-02-25 RX ADMIN — MINOCYCLINE HYDROCHLORIDE 100 MG: 100 CAPSULE ORAL at 09:07

## 2024-02-25 RX ADMIN — MIRTAZAPINE 45 MG: 30 TABLET, FILM COATED ORAL at 21:13

## 2024-02-25 RX ADMIN — SENNOSIDES AND DOCUSATE SODIUM 2 TABLET: 8.6; 5 TABLET ORAL at 17:18

## 2024-02-25 RX ADMIN — ATROPINE SULFATE 1 DROP: 10 SOLUTION/ DROPS OPHTHALMIC at 21:12

## 2024-02-25 RX ADMIN — MINOCYCLINE HYDROCHLORIDE 100 MG: 100 CAPSULE ORAL at 21:13

## 2024-02-25 RX ADMIN — NICOTINE POLACRILEX 4 MG: 4 GUM, CHEWING ORAL at 13:00

## 2024-02-25 RX ADMIN — SENNOSIDES AND DOCUSATE SODIUM 2 TABLET: 8.6; 5 TABLET ORAL at 09:07

## 2024-02-25 RX ADMIN — PROPRANOLOL HYDROCHLORIDE 10 MG: 10 TABLET ORAL at 21:13

## 2024-02-25 RX ADMIN — POLYETHYLENE GLYCOL 3350 17 G: 17 POWDER, FOR SOLUTION ORAL at 13:00

## 2024-02-25 RX ADMIN — CLOZAPINE 425 MG: 25 TABLET ORAL at 21:13

## 2024-02-25 NOTE — NURSING NOTE
Patient is visible on unit, socializing and playing cards with peers. Patient denies SI/HI. Patient continues to endorse auditory hallucinations, denies visual hallucinations. Patient denies anxiety and reports minimal depression. No complaints or unmet needs identified at this time. Q 7 minute safety checks maintained.

## 2024-02-25 NOTE — PROGRESS NOTES
"Progress Note - Behavioral Health   Alberto Berumen 28 y.o. male MRN: [id number]  Unit/Bed#: U 383-02 Encounter: [id number]    Assessment/Plan   Principal Problem:    Schizoaffective disorder, bipolar type (HCC)  Active Problems:    GERD (gastroesophageal reflux disease)    Tobacco abuse    T wave inversion in EKG    Chronic idiopathic constipation    Vitamin B 12 deficiency    Vitamin D deficiency    Acanthosis nigricans    Rash    Recommended Treatment:   Continue current medications.  Group, individual, milieu therapy.  Continue behavioral health checks per routine.  Medical management per SLIM.  Discharge planning    ----------------------------------------      Subjective: Per nursing report patient was isolative to her room during the day although in the evening time became more social with peers.  Compliant with medications and meals.    Patient states that his mood today is \"okay\".  Reports that he skipped breakfast this morning due to decreased appetite and not wanting to get up.  Reports that otherwise his mood remains stable.  Continues to express he has ongoing auditory hallucinations that make negative and derogatory comments that they are going to harm his family.  Otherwise denies any suicidal or homicidal ideations.  No visual hallucinations reported.  Looking forward to having a family meeting to discuss potential for ECT with the sister.    Behavior over the last 24 hours:  unchanged  Sleep: Hypersomnia  Appetite: normal  Medication side effects: No  ROS: no complaints and all other systems are negative    Mental Status Evaluation:  Appearance:  marginal hygiene   Behavior:  cooperative, calm, limited eye contact   Speech:  scant, soft   Mood:  depressed   Affect:  blunted   Thought Process:  goal directed   Associations: concrete associations   Thought Content:  no overt delusions, negative thinking, ruminating thoughts   Perceptual Disturbances: Auditory hallucinations of his family being " harmed   Risk Potential: Suicidal ideation - None at present  Homicidal ideation - None at present  Potential for aggression - No   Sensorium:  oriented to person, place, and time/date   Memory:  recent and remote memory grossly intact   Consciousness:  alert and awake   Attention/Concentration: attention span and concentration appear shorter than expected for age   Insight:  fair   Judgment: fair   Gait/Station: normal gait/station   Motor Activity: no abnormal movements     Medications: all current active meds have been reviewed.  Current Facility-Administered Medications   Medication Dose Route Frequency Provider Last Rate    acetaminophen  650 mg Oral Q6H PRN Yasmin Harvey MD      acetaminophen  650 mg Oral Q4H PRN Yasmin Harvey MD      acetaminophen  975 mg Oral Q6H PRN Yasmin Harvey MD      aluminum-magnesium hydroxide-simethicone  30 mL Oral Q4H PRN Yasmin Harvey MD      atropine  1 drop Sublingual HS HOLLI Anderson      atropine  1 drop Sublingual Daily PRN HOLLI Anderson      haloperidol lactate  2.5 mg Intramuscular Q4H PRN Max 4/day Yasmin Harvey MD      And    LORazepam  1 mg Intramuscular Q4H PRN Max 4/day Yasmin Harvey MD      And    benztropine  0.5 mg Intramuscular Q4H PRN Max 4/day Yasmin Harvey MD      haloperidol lactate  5 mg Intramuscular Q4H PRN Max 4/day Yasmin Harvey MD      And    LORazepam  2 mg Intramuscular Q4H PRN Max 4/day Yasmin Harvey MD      And    benztropine  1 mg Intramuscular Q4H PRN Max 4/day Yasmin Harvey MD      benztropine  1 mg Oral Q4H PRN Max 6/day Yasmin Harvey MD      bisacodyl  10 mg Rectal Daily PRN Yasmin Harvey MD      cloZAPine  425 mg Oral HS HOLLI Anderson      hydrOXYzine HCL  50 mg Oral Q6H PRN Max 4/day Yasmin Harvey MD      Or    diphenhydrAMINE  50 mg Intramuscular Q6H PRN Yasmin Harvey MD      diphenhydrAMINE-zinc acetate   Topical BID PRN Anjel Sagastume MD      escitalopram  20 mg Oral Daily  La Flores MD      haloperidol  1 mg Oral Q6H PRN Yasmin Harvey MD      haloperidol  2.5 mg Oral Q4H PRN Max 4/day Yasmin Harvey MD      haloperidol  5 mg Oral Q4H PRN Max 4/day Yasmin Harvey MD      hydrocortisone   Topical 4x Daily PRN Eileen Jensen, STEVENP      hydrOXYzine HCL  100 mg Oral Q6H PRN Max 4/day Yasmin Harvey MD      Or    LORazepam  2 mg Intramuscular Q6H PRN Yasmin Harvey MD      hydrOXYzine HCL  25 mg Oral Q6H PRN Max 4/day Yasmin Harvey MD      melatonin  3 mg Oral HS Yasmin Harvey MD      metFORMIN  500 mg Oral Daily With Breakfast Eileen Miya Jensen, HOLLI      minocycline  100 mg Oral Q12H KAYLIE EileenHOLLI Cummins      mirtazapine  45 mg Oral HS La Flores MD      nicotine polacrilex  4 mg Oral Q2H PRN Yasmin Harvey MD      polyethylene glycol  17 g Oral Daily PRN Yasmin Harvey MD      polyethylene glycol  17 g Oral Daily Eileen CherryHOLLI Jimenez      propranolol  10 mg Oral Q12H [facility] HOLLI Anderson      senna-docusate sodium  1 tablet Oral Daily PRN Yasmin aHrvey MD      senna-docusate sodium  2 tablet Oral BID La Flores MD      traZODone  50 mg Oral HS PRN Yasmin Harvey MD      white petrolatum-mineral oil   Topical TID PRN Anjel Sagastume MD         Labs: I have personally reviewed all pertinent laboratory/tests results  Most Recent Labs:   Lab Results   Component Value Date    WBC 8.04 02/19/2024    RBC 5.25 02/19/2024    HGB 12.5 02/19/2024    HCT 41.8 02/19/2024     02/19/2024    RDW 13.2 02/19/2024    NEUTROABS 3.81 02/19/2024    SODIUM 136 01/11/2024    K 4.1 01/11/2024     01/11/2024    CO2 27 01/11/2024    BUN 12 01/11/2024    CREATININE 0.90 01/11/2024    GLUC 97 01/11/2024    CALCIUM 9.4 01/11/2024    AST 23 01/11/2024    ALT 50 01/11/2024    ALKPHOS 76 01/11/2024    TP 7.1 01/11/2024    ALB 4.0 01/11/2024    TBILI 0.43 01/11/2024    CHOLESTEROL 161 01/11/2024    HDL 49  01/11/2024    TRIG 132 01/11/2024    LDLCALC 86 01/11/2024    NONHDLC 112 01/11/2024    LITHIUM 0.61 01/09/2024    [id number] 1.062 11/15/2023    SYPHILISAB Non-reactive 11/18/2023    HGBA1C 5.8 (H) 01/23/2024     01/23/2024       Progress Toward Goals: progressing    Risks / Benefits of Treatment:    Risks, benefits, and possible side effects of medications explained to patient and patient verbalizes understanding and agreement for treatment.    Counseling / Coordination of Care:    Total floor / unit time spent today 25 minutes. Greater than 50% of total time was spent with the patient and / or family counseling and / or coordination of care. A description of counseling / coordination of care:  Patient's progress discussed with staff in treatment team meeting.  Medications, treatment progress and treatment plan reviewed with patient.  Reassurance and supportive therapy provided.  Encouraged participation in milieu and group therapy on the unit.    Bora Amaya MD 02/25/24

## 2024-02-25 NOTE — NURSING NOTE
"Patient denies and SI, HI, and VH's.  Stated he does have auditory hallucinations and stated they tell him \"they are going to hurt him and his family\".  Patient denies anxiety.  Stated he has mild depression.  Patient skipped breakfast.  Declined morning metformin.  Denies any needs at this time.  "

## 2024-02-26 LAB
BASOPHILS # BLD AUTO: 0.06 THOUSANDS/ÂΜL (ref 0–0.1)
BASOPHILS NFR BLD AUTO: 1 % (ref 0–1)
BNP SERPL-MCNC: 8 PG/ML (ref 0–100)
CARDIAC TROPONIN I PNL SERPL HS: <2 NG/L (ref 8–18)
CK SERPL-CCNC: 187 U/L (ref 39–308)
CRP SERPL QL: 5.9 MG/L
EOSINOPHIL # BLD AUTO: 0.35 THOUSAND/ÂΜL (ref 0–0.61)
EOSINOPHIL NFR BLD AUTO: 5 % (ref 0–6)
ERYTHROCYTE [DISTWIDTH] IN BLOOD BY AUTOMATED COUNT: 12.9 % (ref 11.6–15.1)
HCT VFR BLD AUTO: 43.3 % (ref 36.5–49.3)
HGB BLD-MCNC: 13.1 G/DL (ref 12–17)
IMM GRANULOCYTES # BLD AUTO: 0.02 THOUSAND/UL (ref 0–0.2)
IMM GRANULOCYTES NFR BLD AUTO: 0 % (ref 0–2)
LYMPHOCYTES # BLD AUTO: 2.61 THOUSANDS/ÂΜL (ref 0.6–4.47)
LYMPHOCYTES NFR BLD AUTO: 37 % (ref 14–44)
MCH RBC QN AUTO: 24.2 PG (ref 26.8–34.3)
MCHC RBC AUTO-ENTMCNC: 30.3 G/DL (ref 31.4–37.4)
MCV RBC AUTO: 80 FL (ref 82–98)
MONOCYTES # BLD AUTO: 0.71 THOUSAND/ÂΜL (ref 0.17–1.22)
MONOCYTES NFR BLD AUTO: 10 % (ref 4–12)
NEUTROPHILS # BLD AUTO: 3.3 THOUSANDS/ÂΜL (ref 1.85–7.62)
NEUTS SEG NFR BLD AUTO: 47 % (ref 43–75)
NRBC BLD AUTO-RTO: 0 /100 WBCS
PLATELET # BLD AUTO: 238 THOUSANDS/UL (ref 149–390)
PMV BLD AUTO: 11 FL (ref 8.9–12.7)
RBC # BLD AUTO: 5.41 MILLION/UL (ref 3.88–5.62)
WBC # BLD AUTO: 7.05 THOUSAND/UL (ref 4.31–10.16)

## 2024-02-26 PROCEDURE — 85025 COMPLETE CBC W/AUTO DIFF WBC: CPT | Performed by: NURSE PRACTITIONER

## 2024-02-26 PROCEDURE — 84484 ASSAY OF TROPONIN QUANT: CPT | Performed by: NURSE PRACTITIONER

## 2024-02-26 PROCEDURE — 83880 ASSAY OF NATRIURETIC PEPTIDE: CPT | Performed by: NURSE PRACTITIONER

## 2024-02-26 PROCEDURE — 86140 C-REACTIVE PROTEIN: CPT | Performed by: NURSE PRACTITIONER

## 2024-02-26 PROCEDURE — 93005 ELECTROCARDIOGRAM TRACING: CPT

## 2024-02-26 PROCEDURE — 99232 SBSQ HOSP IP/OBS MODERATE 35: CPT | Performed by: PSYCHIATRY & NEUROLOGY

## 2024-02-26 PROCEDURE — 82550 ASSAY OF CK (CPK): CPT | Performed by: NURSE PRACTITIONER

## 2024-02-26 RX ADMIN — SENNOSIDES AND DOCUSATE SODIUM 2 TABLET: 8.6; 5 TABLET ORAL at 17:28

## 2024-02-26 RX ADMIN — NICOTINE POLACRILEX 4 MG: 4 GUM, CHEWING ORAL at 13:07

## 2024-02-26 RX ADMIN — POLYETHYLENE GLYCOL 3350 17 G: 17 POWDER, FOR SOLUTION ORAL at 09:39

## 2024-02-26 RX ADMIN — NICOTINE POLACRILEX 4 MG: 4 GUM, CHEWING ORAL at 21:02

## 2024-02-26 RX ADMIN — METFORMIN HYDROCHLORIDE 500 MG: 500 TABLET ORAL at 07:49

## 2024-02-26 RX ADMIN — CLOZAPINE 450 MG: 25 TABLET ORAL at 21:02

## 2024-02-26 RX ADMIN — MINOCYCLINE HYDROCHLORIDE 100 MG: 100 CAPSULE ORAL at 09:40

## 2024-02-26 RX ADMIN — MINOCYCLINE HYDROCHLORIDE 100 MG: 100 CAPSULE ORAL at 21:02

## 2024-02-26 RX ADMIN — MELATONIN TAB 3 MG 3 MG: 3 TAB at 21:02

## 2024-02-26 RX ADMIN — MIRTAZAPINE 45 MG: 30 TABLET, FILM COATED ORAL at 21:02

## 2024-02-26 RX ADMIN — PROPRANOLOL HYDROCHLORIDE 10 MG: 10 TABLET ORAL at 21:02

## 2024-02-26 RX ADMIN — ATROPINE SULFATE 1 DROP: 10 SOLUTION/ DROPS OPHTHALMIC at 21:06

## 2024-02-26 RX ADMIN — PROPRANOLOL HYDROCHLORIDE 10 MG: 10 TABLET ORAL at 09:40

## 2024-02-26 RX ADMIN — SENNOSIDES AND DOCUSATE SODIUM 2 TABLET: 8.6; 5 TABLET ORAL at 09:40

## 2024-02-26 RX ADMIN — NICOTINE POLACRILEX 4 MG: 4 GUM, CHEWING ORAL at 17:53

## 2024-02-26 RX ADMIN — ESCITALOPRAM OXALATE 20 MG: 10 TABLET ORAL at 09:40

## 2024-02-26 RX ADMIN — NICOTINE POLACRILEX 4 MG: 4 GUM, CHEWING ORAL at 15:51

## 2024-02-26 NOTE — NURSING NOTE
Unable to successfully perform lab draw. Patient given water and states he was agreeable to reattempt   Nasalis-Muscle-Based Myocutaneous Island Pedicle Flap Text: Using a #15 blade, an incision was made around the donor flap to the level of the nasalis muscle. Wide lateral undermining was then performed in both the subcutaneous plane above the nasalis muscle, and in a submuscular plane just above periosteum. This allowed the formation of a free nasalis muscle axial pedicle (based on the angular artery) which was still attached to the actual cutaneous flap, increasing its mobility and vascular viability. Hemostasis was obtained with pinpoint electrocoagulation. The flap was mobilized into position and the pivotal anchor points positioned and stabilized with buried interrupted sutures. Subcutaneous and dermal tissues were closed in a multilayered fashion with sutures. Tissue redundancies were excised, and the epidermal edges were apposed without significant tension and sutured with sutures.

## 2024-02-26 NOTE — NURSING NOTE
"Patient was visible out of their room most of the evening. Social with peers. Reports some depression due to their auditory hallucinations that \"they are going to kill me and my family\". To try to overcome their depression patient reports that they prayed today and that helped them. Denies anxiety, SI, and HI.    Medication compliant. Made aware of scheduled lab work and EKG in the morning. Able to make needs known and denies any unmet needs or concerns at this time.   "

## 2024-02-26 NOTE — PROGRESS NOTES
Progress Note - Behavioral Health     Alberto Berumen 28 y.o. male MRN: [id number]   Unit/Bed#: [facility] 383-02 Encounter: [id number]    Documentation, nursing notes, medication reconciliation, labs, and vitals reviewed. Patient was seen for continuing care and reviewed with treatment team. No acute events over the past 24 hours.  Per nursing report, patient has been calm and cooperative, respectful.  Endorses auditory hallucinations.  Napping off and on.  Not attending groups.  No Medication changes over the past 24 hours:  Continues to tolerate current medications with no adverse effects.     On evaluation today, he has been isolative to his room and resting in bed.  Continues to report no improvement in his auditory hallucinations, threatening his family.  Have noted some dip in his mood in the past couple weeks and less verbal and less social.  He is aware of phone calls scheduled today with his sister regarding proceeding with ECT or not.  He states if they are comfortable with that he will be comfortable with it.  No suicidal ideation, plan, or intent. Denies perceptual disturbances and does not exhibit any symptoms of aimee on evaluation.    Psychiatric ROS:  Behavior over the last 24 hours: unchanged  Sleep: slept off and on  Appetite: fair  Medication side effects: No   ROS: no complaints, all other systems are negative    Mental Status Evaluation:    Appearance:  marginal hygiene   Behavior:  guarded   Speech:  scant   Mood:  depressed   Affect:  constricted   Thought Process:  decreased rate of thoughts   Associations: intact associations   Thought Content:  some paranoia   Perceptual Disturbances: auditory hallucinations   Risk Potential: Suicidal ideation - None  Homicidal ideation - None  Potential for aggression - No   Sensorium:  oriented to person, place, time/date, and situation   Memory:  recent and remote memory grossly intact   Consciousness:  alert and awake   Attention/Concentration: attention span  and concentration are age appropriate   Insight:  limited   Judgment: limited   Gait/Station: normal gait/station, normal balance   Motor Activity: no abnormal movements     Vital signs in last 24 hours:    Temp:  [96.9 °F (36.1 °C)-98.6 °F (37 °C)] 98.6 °F (37 °C)  HR:  [] 96  Resp:  [16-18] 18  BP: (137-153)/(86-96) 137/96    Laboratory results: I have personally reviewed all pertinent laboratory/tests results    Results from the past 24 hours:   Recent Results (from the past 24 hour(s))   CBC and differential    Collection Time: 02/26/24 10:12 AM   Result Value Ref Range    WBC 7.05 4.31 - 10.16 Thousand/uL    RBC 5.41 3.88 - 5.62 Million/uL    Hemoglobin 13.1 12.0 - 17.0 g/dL    Hematocrit 43.3 36.5 - 49.3 %    MCV 80 (L) 82 - 98 fL    MCH 24.2 (L) 26.8 - 34.3 pg    MCHC 30.3 (L) 31.4 - 37.4 g/dL    RDW 12.9 11.6 - 15.1 %    MPV 11.0 8.9 - 12.7 fL    Platelets 238 149 - 390 Thousands/uL    nRBC 0 /100 WBCs    Neutrophils Relative 47 43 - 75 %    Immat GRANS % 0 0 - 2 %    Lymphocytes Relative 37 14 - 44 %    Monocytes Relative 10 4 - 12 %    Eosinophils Relative 5 0 - 6 %    Basophils Relative 1 0 - 1 %    Neutrophils Absolute 3.30 1.85 - 7.62 Thousands/µL    Immature Grans Absolute 0.02 0.00 - 0.20 Thousand/uL    Lymphocytes Absolute 2.61 0.60 - 4.47 Thousands/µL    Monocytes Absolute 0.71 0.17 - 1.22 Thousand/µL    Eosinophils Absolute 0.35 0.00 - 0.61 Thousand/µL    Basophils Absolute 0.06 0.00 - 0.10 Thousands/µL   CK    Collection Time: 02/26/24 10:12 AM   Result Value Ref Range    Total  39 - 308 U/L   C-reactive protein    Collection Time: 02/26/24 10:12 AM   Result Value Ref Range    CRP 5.9 (H) <3.0 mg/L   B-Type Natriuretic Peptide(BNP)    Collection Time: 02/26/24 10:12 AM   Result Value Ref Range    BNP 8 0 - 100 pg/mL   High Sensitivity Troponin I Random    Collection Time: 02/26/24 10:12 AM   Result Value Ref Range    HS TnI random <2 (L) 8 - 18 ng/L       Suicide/Homicide Risk  Assessment:    Risk of Harm to Self:   Current Specific Risk Factors include: current depressive symptoms, current psychotic symptoms  Protective Factors: no current suicidal ideation  Based on today's assessment, Alberto presents the following risk of harm to self: minimal    Risk of Harm to Others:  Current Specific Risk Factors include: none  Protective Factors: no current homicidal ideation, compliant with medications on the unit as ordered, compliant with unit milieu  Based on today's assessment, Alberto presents the following risk of harm to others: minimal    The following interventions are recommended: behavioral checks every 7 minutes, continued hospitalization on locked unit    Progress Toward Goals: minimal improvement    Assessment/Plan   Principal Problem:    Schizoaffective disorder, bipolar type ([facility])  Active Problems:    GERD (gastroesophageal reflux disease)    Tobacco abuse    T wave inversion in EKG    Chronic idiopathic constipation    Vitamin B 12 deficiency    Vitamin D deficiency    Acanthosis nigricans    Rash      Recommended Treatment:     Planned medication and treatment changes:    All current active medications have been reviewed  Encourage group therapy, milieu therapy and occupational therapy  Behavioral Health checks every 7 minutes    Family meeting today via phone to discussed ECT with sisters  Increase Clozaril to 450 mg at bedtime  Check clozapine level on Thursday evening  Monitor weekly CBC, CK, CRP, BNP, troponin for Clozaril monitoring every Monday    Continue all other psychiatric medications the same    Current Facility-Administered Medications   Medication Dose Route Frequency Provider Last Rate    acetaminophen  650 mg Oral Q6H PRN Yasmin Harvey MD      acetaminophen  650 mg Oral Q4H PRN Yasmin Harvey MD      acetaminophen  975 mg Oral Q6H PRN Yasmin Harvey MD      aluminum-magnesium hydroxide-simethicone  30 mL Oral Q4H PRN Yasmin Harvey MD      atropine   1 drop Sublingual HS HOLLI Anderson      atropine  1 drop Sublingual Daily PRN Prisca Villafuerte, HOLLI      haloperidol lactate  2.5 mg Intramuscular Q4H PRN Max 4/day Yasmin Harvey MD      And    LORazepam  1 mg Intramuscular Q4H PRN Max 4/day Yasmin Harvey MD      And    benztropine  0.5 mg Intramuscular Q4H PRN Max 4/day Yasmin Harvey MD      haloperidol lactate  5 mg Intramuscular Q4H PRN Max 4/day Yasmin Harvey MD      And    LORazepam  2 mg Intramuscular Q4H PRN Max 4/day Yasmin Harvey MD      And    benztropine  1 mg Intramuscular Q4H PRN Max 4/day Yasmin Harvey MD      benztropine  1 mg Oral Q4H PRN Max 6/day Yasmin Harvey MD      bisacodyl  10 mg Rectal Daily PRN Yasmin Harvey MD      cloZAPine  450 mg Oral HS HOLLI Anderson      hydrOXYzine HCL  50 mg Oral Q6H PRN Max 4/day Yasmin Harvey MD      Or    diphenhydrAMINE  50 mg Intramuscular Q6H PRN Yasmin Harvey MD      diphenhydrAMINE-zinc acetate   Topical BID PRN Anjel Sagastume MD      escitalopram  20 mg Oral Daily La Flores MD      haloperidol  1 mg Oral Q6H PRN Yasmin Harvey MD      haloperidol  2.5 mg Oral Q4H PRN Max 4/day Yasmin Harvey MD      haloperidol  5 mg Oral Q4H PRN Max 4/day Yasmin Harvey MD      hydrocortisone   Topical 4x Daily PRN HOLLI Monge      hydrOXYzine HCL  100 mg Oral Q6H PRN Max 4/day Yasmin Harvey MD      Or    LORazepam  2 mg Intramuscular Q6H PRN Yasmin Harvey MD      hydrOXYzine HCL  25 mg Oral Q6H PRN Max 4/day Yasmin Harvey MD      melatonin  3 mg Oral HS Yasmin Harvey MD      metFORMIN  500 mg Oral Daily With Breakfast HOLLI Monge      minocycline  100 mg Oral Q12H KAYLIE HOLLI Monge      mirtazapine  45 mg Oral HS La Flores MD      nicotine polacrilex  4 mg Oral Q2H PRN Yasmin Harvey MD      polyethylene glycol  17 g Oral Daily PRN Yasmin Harvey MD      polyethylene glycol  17 g  Oral Daily HOLLI Monge      propranolol  10 mg Oral Q12H [facility] HOLLI Anderson      senna-docusate sodium  1 tablet Oral Daily PRN Yasmin Harvey MD      senna-docusate sodium  2 tablet Oral BID La Flores MD      traZODone  50 mg Oral HS PRN Yasmin Harvey MD      white petrolatum-mineral oil   Topical TID PRN Anjel Sagastume MD       Risks / Benefits of Treatment:    Risks, benefits, and possible side effects of medications explained to patient and patient verbalizes understanding and agreement for treatment.  Discussed risks and benefits of Electroconvulsive Treatment with patient including risks of anesthesia and memory loss. Alberto verbalized understanding and agreed for ECT.    Counseling / Coordination of Care:    Patient's progress discussed with staff in treatment team meeting.  Medications, treatment progress and treatment plan reviewed with patient.    HOLLI Anderson 02/26/24     yes

## 2024-02-26 NOTE — NURSING NOTE
Patient is calm and cooperative. Patient is respectful and actively engages with staff and peers on the unit.  Patient denies SI,HI,SIB. He endorses auditory hallucinations. Napping most of the morning. Not attending groups.   Med and meal compliant. Denies any unmet needs at this time. Q7 safety checks ongoing.

## 2024-02-27 LAB
ATRIAL RATE: 94 BPM
P AXIS: 38 DEGREES
PR INTERVAL: 150 MS
QRS AXIS: 48 DEGREES
QRSD INTERVAL: 84 MS
QT INTERVAL: 354 MS
QTC INTERVAL: 442 MS
T WAVE AXIS: -10 DEGREES
VENTRICULAR RATE: 94 BPM

## 2024-02-27 PROCEDURE — 99232 SBSQ HOSP IP/OBS MODERATE 35: CPT | Performed by: PSYCHIATRY & NEUROLOGY

## 2024-02-27 PROCEDURE — 93010 ELECTROCARDIOGRAM REPORT: CPT | Performed by: INTERNAL MEDICINE

## 2024-02-27 RX ADMIN — SENNOSIDES AND DOCUSATE SODIUM 2 TABLET: 8.6; 5 TABLET ORAL at 08:26

## 2024-02-27 RX ADMIN — NICOTINE POLACRILEX 4 MG: 4 GUM, CHEWING ORAL at 13:12

## 2024-02-27 RX ADMIN — POLYETHYLENE GLYCOL 3350 17 G: 17 POWDER, FOR SOLUTION ORAL at 08:25

## 2024-02-27 RX ADMIN — MINOCYCLINE HYDROCHLORIDE 100 MG: 100 CAPSULE ORAL at 21:46

## 2024-02-27 RX ADMIN — ATROPINE SULFATE 1 DROP: 10 SOLUTION/ DROPS OPHTHALMIC at 21:46

## 2024-02-27 RX ADMIN — NICOTINE POLACRILEX 4 MG: 4 GUM, CHEWING ORAL at 20:48

## 2024-02-27 RX ADMIN — CLOZAPINE 450 MG: 25 TABLET ORAL at 21:46

## 2024-02-27 RX ADMIN — MELATONIN TAB 3 MG 3 MG: 3 TAB at 21:46

## 2024-02-27 RX ADMIN — PROPRANOLOL HYDROCHLORIDE 10 MG: 10 TABLET ORAL at 21:46

## 2024-02-27 RX ADMIN — ESCITALOPRAM OXALATE 20 MG: 10 TABLET ORAL at 08:26

## 2024-02-27 RX ADMIN — NICOTINE POLACRILEX 4 MG: 4 GUM, CHEWING ORAL at 17:24

## 2024-02-27 RX ADMIN — MIRTAZAPINE 45 MG: 30 TABLET, FILM COATED ORAL at 21:46

## 2024-02-27 RX ADMIN — PROPRANOLOL HYDROCHLORIDE 10 MG: 10 TABLET ORAL at 08:26

## 2024-02-27 RX ADMIN — SENNOSIDES AND DOCUSATE SODIUM 2 TABLET: 8.6; 5 TABLET ORAL at 17:22

## 2024-02-27 RX ADMIN — METFORMIN HYDROCHLORIDE 500 MG: 500 TABLET ORAL at 08:26

## 2024-02-27 RX ADMIN — MINOCYCLINE HYDROCHLORIDE 100 MG: 100 CAPSULE ORAL at 08:26

## 2024-02-27 NOTE — NURSING NOTE
Patient is calm and cooperative. Patient is med and meal complaint. Endorses auditory hallucinations. Patient is visible and social. Patient denies SI,HI,SIB. Patient is respectful and engages with staff and peers. Napping most of the morning. Not attending groups. Q7 safety checks ongoing.

## 2024-02-27 NOTE — PROGRESS NOTES
Progress Note - Behavioral Health     Alberto Berumen 28 y.o. male MRN: [id number]   Unit/Bed#: [facility] 383-02 Encounter: [id number]    Documentation, nursing notes, medication reconciliation, labs, and vitals reviewed. Patient was seen for continuing care and reviewed with treatment team. No acute events over the past 24 hours.  Per nursing report, has been calm and cooperative.  Ongoing hallucinations, napping off and on this morning.  Medication changes over the past 24 hours: His clozapine was increased to 450  mg at HS with plan for clozapine level to be done Thursday evening. Alberto Continues to tolerate current medications with no adverse effects. On evaluation today, he is seen in his room and in bed.  Brighten's slightly on approach. States he spoke with his sister's and now all in agreement .  He is aware ECT to start tomorrow.  No suicidal ideation, plan, or intent. Ongoing perceptual disturbances and does not exhibit any symptoms of aimee on evaluation.    Psychiatric ROS:  Behavior over the last 24 hours: unchanged  Sleep: slept off and on  Appetite: fair  Medication side effects: No   ROS: no complaints, all other systems are negative    Mental Status Evaluation:    Appearance:  marginal hygiene   Behavior:  cooperative, guarded   Speech:  slow   Mood:  depressed   Affect:  flat   Thought Process:  goal directed   Associations: concrete associations   Thought Content:  some paranoia   Perceptual Disturbances: auditory hallucinations   Risk Potential: Suicidal ideation - None  Homicidal ideation - None  Potential for aggression - No   Sensorium:  oriented to person, place, and time/date   Memory:  recent and remote memory grossly intact   Consciousness:  alert and awake   Attention/Concentration: decreased concentration and decreased attention span   Insight:  limited   Judgment: limited   Gait/Station: normal gait/station, normal balance   Motor Activity: no abnormal movements     Vital signs in last 24  hours:    Temp:  [97.8 °F (36.6 °C)-98.3 °F (36.8 °C)] 97.8 °F (36.6 °C)  HR:  [101-107] 101  Resp:  [16-18] 18  BP: (134-157)/(92-97) 152/92    Laboratory results: I have personally reviewed all pertinent laboratory/tests results    Results from the past 24 hours: No results found for this or any previous visit (from the past 24 hour(s)).    Suicide/Homicide Risk Assessment:    Risk of Harm to Self:   Current Specific Risk Factors include: diagnosis of mood disorder, current depressive symptoms, current psychotic symptoms  Protective Factors: no current suicidal ideation, ability to communicate with staff on the unit, able to contract for safety on the unit, taking medications as ordered on the unit  Based on today's assessment, Alberto presents the following risk of harm to self: low    Risk of Harm to Others:  Current Specific Risk Factors include: current psychotic symptoms  Protective Factors: no current homicidal ideation, able to communicate with staff on the unit, mood is stabilizing, compliant with medications on the unit as ordered  Based on today's assessment, Alberto presents the following risk of harm to others: minimal    The following interventions are recommended: behavioral checks every 7 minutes, continued hospitalization on locked unit    Progress Toward Goals: progressing    Assessment/Plan   Principal Problem:    Schizoaffective disorder, bipolar type ([facility])  Active Problems:    GERD (gastroesophageal reflux disease)    Tobacco abuse    T wave inversion in EKG    Chronic idiopathic constipation    Vitamin B 12 deficiency    Vitamin D deficiency    Acanthosis nigricans    Rash      Recommended Treatment:     Planned medication and treatment changes:    All current active medications have been reviewed  Encourage group therapy, milieu therapy and occupational therapy  Behavioral Health checks every 7 minutes  Continue all other medications:    Current Facility-Administered Medications   Medication  Dose Route Frequency Provider Last Rate    acetaminophen  650 mg Oral Q6H PRN Yasmin Harvey MD      acetaminophen  650 mg Oral Q4H PRN Yasmin Harvey MD      acetaminophen  975 mg Oral Q6H PRN Yasmin Harvey MD      aluminum-magnesium hydroxide-simethicone  30 mL Oral Q4H PRN Yasmin Harvey MD      atropine  1 drop Sublingual HS Prisca Villafuerte, HOLLI      atropine  1 drop Sublingual Daily PRN HOLLI Anderson      haloperidol lactate  2.5 mg Intramuscular Q4H PRN Max 4/day Yasmin Harvey MD      And    LORazepam  1 mg Intramuscular Q4H PRN Max 4/day Yasmin Harvey MD      And    benztropine  0.5 mg Intramuscular Q4H PRN Max 4/day Yasmin Harvey MD      haloperidol lactate  5 mg Intramuscular Q4H PRN Max 4/day Yasmin Harvey MD      And    LORazepam  2 mg Intramuscular Q4H PRN Max 4/day Yasmin Harvey MD      And    benztropine  1 mg Intramuscular Q4H PRN Max 4/day Yasmin Harvey MD      benztropine  1 mg Oral Q4H PRN Max 6/day Yasmin Harvey MD      bisacodyl  10 mg Rectal Daily PRN Yasmin Harvey MD      cloZAPine  450 mg Oral HS HOLLI Anderson      hydrOXYzine HCL  50 mg Oral Q6H PRN Max 4/day Yasmin Harvey MD      Or    diphenhydrAMINE  50 mg Intramuscular Q6H PRN Yasmin Harvey MD      diphenhydrAMINE-zinc acetate   Topical BID PRN Anjel Sagastume MD      escitalopram  20 mg Oral Daily La Flores MD      haloperidol  1 mg Oral Q6H PRN Yasmin Harvey MD      haloperidol  2.5 mg Oral Q4H PRN Max 4/day Yasmin Harvey MD      haloperidol  5 mg Oral Q4H PRN Max 4/day Yasmni Harvey MD      hydrocortisone   Topical 4x Daily PRN HOLLI Monge      hydrOXYzine HCL  100 mg Oral Q6H PRN Max 4/day Yasmin Harvey MD      Or    LORazepam  2 mg Intramuscular Q6H PRN Yasmin Harvey MD      hydrOXYzine HCL  25 mg Oral Q6H PRN Max 4/day Yasmin Harvey MD      melatonin  3 mg Oral HS Yasmin Harvey MD      metFORMIN  500 mg Oral Daily With  Breakfast HOLLI Monge      minocycline  100 mg Oral Q12H [facility] HOLLI Monge      mirtazapine  45 mg Oral HS La Flores MD      nicotine polacrilex  4 mg Oral Q2H PRN Yasmin Harvey MD      polyethylene glycol  17 g Oral Daily PRN Yasmin Harvey MD      polyethylene glycol  17 g Oral Daily EileenHOLLI Cummins      propranolol  10 mg Oral Q12H [facility] HOLLI Anderson      senna-docusate sodium  1 tablet Oral Daily PRN Yasmin Harvey MD      senna-docusate sodium  2 tablet Oral BID La Flores MD      traZODone  50 mg Oral HS PRN Yasmin Harvey MD      white petrolatum-mineral oil   Topical TID PRN Anjel Sagastume MD       Risks / Benefits of Treatment:    Risks, benefits, and possible side effects of medications explained to patient and patient verbalizes understanding and agreement for treatment.  Discussed risks and benefits of Electroconvulsive Treatment with patient including risks of anesthesia and memory loss. Alberto verbalized understanding and agreed for ECT.    Counseling / Coordination of Care:    Patient's progress discussed with staff in treatment team meeting.  Medications, treatment progress and treatment plan reviewed with patient.  At this time patient has limited understanding of diagnosis, medication changes and treatment plan and will require further explanation.    HOLLI Anderson 02/27/24

## 2024-02-27 NOTE — PROGRESS NOTES
02/27/24 1028   Team Meeting   Meeting Type Daily Rounds   Team Members Present   Team Members Present Physician;Nurse;   Physician Team Member MD Mark   Nursing Team Member Earl   Care Management Team Member Quinn   Patient/Family Present   Patient Present No   Patient's Family Present No   OTHER   Team Meeting - Additional Comments Denies SI/HI.  Medication and meal compliant.  Continues to experience audatory hallucinations.  Now agreeable to ECT after education was provided to patient and sisters, scheduled for wednesday.  D/C TBD.

## 2024-02-27 NOTE — NURSING NOTE
Patient pleasant on approach, denies SI/HI/VH reports auditory hallucinations, but able to deal with hallucinations. Patient compliant with medication and unit routine.

## 2024-02-27 NOTE — PROGRESS NOTES
02/26/24 3423   Team Meeting   Meeting Type Daily Rounds   Team Members Present   Team Members Present Physician;Nurse;;Other (Discipline and Name)   Physician Team Member MD Mark   Nursing Team Member Tavon   Care Management Team Member Quinn   Other (Discipline and Name) Serg (PharmD)Marco (therapist)   Patient/Family Present   Patient Present No   Patient's Family Present No   OTHER   Team Meeting - Additional Comments Depressive symptoms and auditory hallucinations over the weekend.  Medication and meal complaint.  Attended most groups.  Clozaril dose increassed.  D/C TBD

## 2024-02-28 ENCOUNTER — ANESTHESIA EVENT (INPATIENT)
Dept: PREOP/PACU | Facility: HOSPITAL | Age: 29
End: 2024-02-28
Payer: COMMERCIAL

## 2024-02-28 ENCOUNTER — APPOINTMENT (INPATIENT)
Dept: PREOP/PACU | Facility: HOSPITAL | Age: 29
DRG: 750 | End: 2024-02-28
Attending: STUDENT IN AN ORGANIZED HEALTH CARE EDUCATION/TRAINING PROGRAM
Payer: COMMERCIAL

## 2024-02-28 ENCOUNTER — ANESTHESIA (INPATIENT)
Dept: PREOP/PACU | Facility: HOSPITAL | Age: 29
End: 2024-02-28
Payer: COMMERCIAL

## 2024-02-28 PROCEDURE — GZB2ZZZ ELECTROCONVULSIVE THERAPY, BILATERAL-SINGLE SEIZURE: ICD-10-PCS | Performed by: STUDENT IN AN ORGANIZED HEALTH CARE EDUCATION/TRAINING PROGRAM

## 2024-02-28 PROCEDURE — 90870 ELECTROCONVULSIVE THERAPY: CPT

## 2024-02-28 PROCEDURE — 99232 SBSQ HOSP IP/OBS MODERATE 35: CPT | Performed by: PSYCHIATRY & NEUROLOGY

## 2024-02-28 PROCEDURE — 90870 ELECTROCONVULSIVE THERAPY: CPT | Performed by: STUDENT IN AN ORGANIZED HEALTH CARE EDUCATION/TRAINING PROGRAM

## 2024-02-28 RX ORDER — ALBUTEROL SULFATE 2.5 MG/3ML
2.5 SOLUTION RESPIRATORY (INHALATION) ONCE AS NEEDED
Status: DISCONTINUED | OUTPATIENT
Start: 2024-02-28 | End: 2024-02-28 | Stop reason: HOSPADM

## 2024-02-28 RX ORDER — ALBUTEROL SULFATE 2.5 MG/3ML
2.5 SOLUTION RESPIRATORY (INHALATION) ONCE AS NEEDED
Status: CANCELLED | OUTPATIENT
Start: 2024-02-28

## 2024-02-28 RX ORDER — METHOCARBAMOL 500 MG/1
500 TABLET, FILM COATED ORAL EVERY 6 HOURS PRN
Status: DISCONTINUED | OUTPATIENT
Start: 2024-02-28 | End: 2024-03-29 | Stop reason: HOSPADM

## 2024-02-28 RX ORDER — SUCCINYLCHOLINE/SOD CL,ISO/PF 100 MG/5ML
SYRINGE (ML) INTRAVENOUS AS NEEDED
Status: DISCONTINUED | OUTPATIENT
Start: 2024-02-28 | End: 2024-02-28

## 2024-02-28 RX ORDER — ESMOLOL HYDROCHLORIDE 10 MG/ML
INJECTION INTRAVENOUS AS NEEDED
Status: DISCONTINUED | OUTPATIENT
Start: 2024-02-28 | End: 2024-02-28

## 2024-02-28 RX ORDER — SODIUM CHLORIDE, SODIUM LACTATE, POTASSIUM CHLORIDE, CALCIUM CHLORIDE 600; 310; 30; 20 MG/100ML; MG/100ML; MG/100ML; MG/100ML
100 INJECTION, SOLUTION INTRAVENOUS CONTINUOUS
Status: CANCELLED | OUTPATIENT
Start: 2024-02-28

## 2024-02-28 RX ORDER — METHOHEXITAL IN WATER/PF 100MG/10ML
SYRINGE (ML) INTRAVENOUS AS NEEDED
Status: DISCONTINUED | OUTPATIENT
Start: 2024-02-28 | End: 2024-02-28

## 2024-02-28 RX ORDER — SODIUM CHLORIDE, SODIUM LACTATE, POTASSIUM CHLORIDE, CALCIUM CHLORIDE 600; 310; 30; 20 MG/100ML; MG/100ML; MG/100ML; MG/100ML
100 INJECTION, SOLUTION INTRAVENOUS CONTINUOUS
Status: DISCONTINUED | OUTPATIENT
Start: 2024-02-28 | End: 2024-03-29 | Stop reason: HOSPADM

## 2024-02-28 RX ORDER — GLYCOPYRROLATE 0.2 MG/ML
INJECTION INTRAMUSCULAR; INTRAVENOUS AS NEEDED
Status: DISCONTINUED | OUTPATIENT
Start: 2024-02-28 | End: 2024-02-28

## 2024-02-28 RX ORDER — ONDANSETRON 2 MG/ML
4 INJECTION INTRAMUSCULAR; INTRAVENOUS ONCE AS NEEDED
Status: DISCONTINUED | OUTPATIENT
Start: 2024-02-28 | End: 2024-02-28 | Stop reason: HOSPADM

## 2024-02-28 RX ORDER — ESMOLOL HYDROCHLORIDE 10 MG/ML
INJECTION, SOLUTION INTRAVENOUS AS NEEDED
Status: DISCONTINUED | OUTPATIENT
Start: 2024-02-28 | End: 2024-02-28

## 2024-02-28 RX ORDER — ONDANSETRON 2 MG/ML
4 INJECTION INTRAMUSCULAR; INTRAVENOUS ONCE AS NEEDED
Status: CANCELLED | OUTPATIENT
Start: 2024-02-28

## 2024-02-28 RX ADMIN — ESCITALOPRAM OXALATE 20 MG: 10 TABLET ORAL at 08:21

## 2024-02-28 RX ADMIN — ACETAMINOPHEN 650 MG: 325 TABLET ORAL at 15:28

## 2024-02-28 RX ADMIN — NICOTINE POLACRILEX 4 MG: 4 GUM, CHEWING ORAL at 17:43

## 2024-02-28 RX ADMIN — GLYCOPYRROLATE 0.2 MG: 0.2 INJECTION INTRAMUSCULAR; INTRAVENOUS at 06:28

## 2024-02-28 RX ADMIN — METFORMIN HYDROCHLORIDE 500 MG: 500 TABLET ORAL at 08:21

## 2024-02-28 RX ADMIN — CLOZAPINE 450 MG: 25 TABLET ORAL at 21:04

## 2024-02-28 RX ADMIN — METHOHEXITAL SODIUM 140 MG: 500 INJECTION, POWDER, LYOPHILIZED, FOR SOLUTION INTRAMUSCULAR; INTRAVENOUS; RECTAL at 06:49

## 2024-02-28 RX ADMIN — MIRTAZAPINE 45 MG: 30 TABLET, FILM COATED ORAL at 21:04

## 2024-02-28 RX ADMIN — ATROPINE SULFATE 1 DROP: 10 SOLUTION/ DROPS OPHTHALMIC at 21:04

## 2024-02-28 RX ADMIN — SODIUM CHLORIDE, SODIUM LACTATE, POTASSIUM CHLORIDE, AND CALCIUM CHLORIDE: .6; .31; .03; .02 INJECTION, SOLUTION INTRAVENOUS at 06:23

## 2024-02-28 RX ADMIN — ESMOLOL HYDROCHLORIDE 50 MG: 10 INJECTION, SOLUTION INTRAVENOUS at 06:59

## 2024-02-28 RX ADMIN — SENNOSIDES AND DOCUSATE SODIUM 2 TABLET: 8.6; 5 TABLET ORAL at 08:20

## 2024-02-28 RX ADMIN — MELATONIN TAB 3 MG 3 MG: 3 TAB at 21:04

## 2024-02-28 RX ADMIN — PROPRANOLOL HYDROCHLORIDE 10 MG: 10 TABLET ORAL at 08:23

## 2024-02-28 RX ADMIN — SENNOSIDES AND DOCUSATE SODIUM 2 TABLET: 8.6; 5 TABLET ORAL at 17:55

## 2024-02-28 RX ADMIN — NICOTINE POLACRILEX 4 MG: 4 GUM, CHEWING ORAL at 21:04

## 2024-02-28 RX ADMIN — PROPRANOLOL HYDROCHLORIDE 10 MG: 10 TABLET ORAL at 21:05

## 2024-02-28 RX ADMIN — ACETAMINOPHEN 975 MG: 325 TABLET ORAL at 08:21

## 2024-02-28 RX ADMIN — Medication 140 MG: at 06:49

## 2024-02-28 RX ADMIN — MINOCYCLINE HYDROCHLORIDE 100 MG: 100 CAPSULE ORAL at 21:04

## 2024-02-28 RX ADMIN — MINOCYCLINE HYDROCHLORIDE 100 MG: 100 CAPSULE ORAL at 08:23

## 2024-02-28 RX ADMIN — NICOTINE POLACRILEX 4 MG: 4 GUM, CHEWING ORAL at 13:04

## 2024-02-28 RX ADMIN — POLYETHYLENE GLYCOL 3350 17 G: 17 POWDER, FOR SOLUTION ORAL at 08:27

## 2024-02-28 RX ADMIN — SODIUM CHLORIDE, SODIUM LACTATE, POTASSIUM CHLORIDE, AND CALCIUM CHLORIDE: .6; .31; .03; .02 INJECTION, SOLUTION INTRAVENOUS at 07:03

## 2024-02-28 NOTE — ANESTHESIA PREPROCEDURE EVALUATION
Medical History    History Comments   Schizoaffective disorder (HCC)    GERD (gastroesophageal reflux disease)    T wave inversion in EKG    Syringoma    Chronic idiopathic constipation    Anemia    Procedure:  ELECTROCONVULSIVE THERAPY (ECT)    Relevant Problems   ANESTHESIA (within normal limits)      GI/HEPATIC   (+) GERD (gastroesophageal reflux disease)        Physical Exam    Airway    Mallampati score: III  TM Distance: >3 FB  Neck ROM: full     Dental       Cardiovascular  Rate: normal    Pulmonary  Pulmonary exam normal     Other Findings  Per pt denies anything remaining that is loose or removeable      Anesthesia Plan  ASA Score- 3     Anesthesia Type- general with ASA Monitors.         Additional Monitors:     Airway Plan: ETT.           Plan Factors-Exercise tolerance (METS): >4 METS.    Chart reviewed. EKG reviewed.  Existing labs reviewed. Patient summary reviewed.    Patient is a current smoker.              Induction- intravenous.    Postoperative Plan-     Informed Consent- Anesthetic plan and risks discussed with patient.  I personally reviewed this patient with the CRNA. Discussed and agreed on the Anesthesia Plan with the CRNA..

## 2024-02-28 NOTE — NURSING NOTE
Pt escorted to PACU in wheelchair for ECT by BHT and security. Pt NPO after midnight. Vitals WNL.

## 2024-02-28 NOTE — PROGRESS NOTES
02/28/24 1603   Team Meeting   Meeting Type Daily Rounds   Team Members Present   Team Members Present Physician;Nurse;   Physician Team Member Mark   Nursing Team Member Earl   Care Management Team Member Miguelito   Patient/Family Present   Patient Present No   Patient's Family Present No     Patient currently holds 201 status. Patient reports AH. Patient is medication and meal compliant. Patient is to continue on all current scheduled medications. Patient to start ECT today. Patient discharge date and time is to be determined.

## 2024-02-28 NOTE — ANESTHESIA POSTPROCEDURE EVALUATION
Post-Op Assessment Note    CV Status:  Stable  Pain Score: 0    Pain management: adequate       Mental Status:  Alert and awake   Hydration Status:  Euvolemic   PONV Controlled:  Controlled   Airway Patency:  Patent     Post Op Vitals Reviewed: Yes    No anethesia notable event occurred.    Staff: CRNA               BP   155/69   Temp   98   Pulse  107   Resp   16   SpO2   95

## 2024-02-28 NOTE — NURSING NOTE
"Patient is visible  on unit, walking the hallway, watching tv in dayroom, socializing with peers and staff. Patient remains calm, cooperative, and compliant with medications, Patient continues to endorse auditory hallucinations saying \"they will kill him and his family\". Patient denies SI/HI and visual hallucinations. Patient was educated and agreed to ECT treatment. Patient reminded to remain NPO after midnight and he reported taking a shower and tended to ADLs.NO questions, complaints, or unmet needs identified at this time. Q 7 minute safety checks maintained.   "

## 2024-02-28 NOTE — PROGRESS NOTES
"Progress Note - Behavioral Health     Alberto Berumen 28 y.o. male MRN: [id number]   Unit/Bed#: [facility] 383-02 Encounter: [id number]    Documentation, nursing notes, medication reconciliation, labs, and vitals reviewed. Patient was seen for continuing care and reviewed with treatment team. No acute events over the past 24 hours.  Per nursing report, Patient continues to endorse auditory hallucinations saying \"they will kill him and his family\" .  Cooperative for shower and prepped for ECT this morning.    No medication changes over the past 24 hours.  Started ECT treatment #1 this morning .  continues to tolerate current medications with no adverse effects. On evaluation today, he is seen in his room and resting in bed.  When asked about ECT this morning states \"it was rough\".  When asked to elaborate what was difficult about it states only \"it was rough on my body\".  States he is not sure he wants to continue ECT but we will see how he feels later today.  Continues to report no change in his auditory hallucinations.    No suicidal ideation, plan, or intent.  Ongoing perceptual disturbances and does not exhibit any symptoms of aimee on evaluation.    Psychiatric ROS:  Behavior over the last 24 hours: unchanged  Sleep: slept off and on  Appetite: fair  Medication side effects: No   ROS: no complaints, all other systems are negative    Mental Status Evaluation:    Appearance:  dressed appropriately   Behavior:  cooperative, guarded   Speech:  poverty of speech   Mood:  dysphoric   Affect:  constricted   Thought Process:  decreased rate of thoughts   Associations: concrete associations   Thought Content:  paranoid ideation   Perceptual Disturbances: auditory hallucinations   Risk Potential: Suicidal ideation - None  Homicidal ideation - None  Potential for aggression - No   Sensorium:  oriented to person, place, and time/date   Memory:  recent and remote memory grossly intact   Consciousness:  alert and awake "   Attention/Concentration: decreased concentration and decreased attention span   Insight:  limited   Judgment: limited   Gait/Station: normal gait/station, normal balance   Motor Activity: no abnormal movements     Vital signs in last 24 hours:    Temp:  [98.7 °F (37.1 °C)] 98.7 °F (37.1 °C)  HR:  [] 104  Resp:  [16-21] 18  BP: (104-156)/(59-83) 133/66    Laboratory results: I have personally reviewed all pertinent laboratory/tests results    Results from the past 24 hours: No results found for this or any previous visit (from the past 24 hour(s)).    Suicide/Homicide Risk Assessment:    Risk of Harm to Self:   Current Specific Risk Factors include: current depressive symptoms, current psychotic symptoms  Protective Factors: no current suicidal ideation, ability to communicate with staff on the unit, able to contract for safety on the unit, taking medications as ordered on the unit  Based on today's assessment, Alberto presents the following risk of harm to self: minimal    Risk of Harm to Others:  Current Specific Risk Factors include: current psychotic symptoms  Protective Factors: no current homicidal ideation, able to communicate with staff on the unit, psychotic symptoms are less prominent, compliant with medications on the unit as ordered  Based on today's assessment, Alberto presents the following risk of harm to others: minimal    The following interventions are recommended: behavioral checks every 7 minutes, continued hospitalization on locked unit    Progress Toward Goals: progressing    Assessment/Plan   Principal Problem:    Schizoaffective disorder, bipolar type (Formerly [facility])  Active Problems:    GERD (gastroesophageal reflux disease)    Tobacco abuse    T wave inversion in EKG    Chronic idiopathic constipation    Vitamin B 12 deficiency    Vitamin D deficiency    Acanthosis nigricans    Rash      Recommended Treatment:     Planned medication and treatment changes:    All current active medications have  been reviewed  Encourage group therapy, milieu therapy and occupational therapy  Behavioral Health checks every 7 minutes  Scheduled for ECT #2 on Friday  Check clozapine level tomorrow  Monitor weekly CBC, CK, EKG, BNP, CRP, troponin on Mondays for Clozaril monitoring    Continue all other medications:    Current Facility-Administered Medications   Medication Dose Route Frequency Provider Last Rate    acetaminophen  650 mg Oral Q6H PRN Yasmin Harvey MD      acetaminophen  650 mg Oral Q4H PRN Yasmin Harvey MD      acetaminophen  975 mg Oral Q6H PRN Yasmin Harvey MD      albuterol  2.5 mg Nebulization Once PRN Elisa Workman MD      aluminum-magnesium hydroxide-simethicone  30 mL Oral Q4H PRN Yasmin Harvey MD      atropine  1 drop Sublingual HS HOLLI Anderson      atropine  1 drop Sublingual Daily PRN HOLLI Anderson      haloperidol lactate  2.5 mg Intramuscular Q4H PRN Max 4/day Yasmin Harvey MD      And    LORazepam  1 mg Intramuscular Q4H PRN Max 4/day Yasmin Harvey MD      And    benztropine  0.5 mg Intramuscular Q4H PRN Max 4/day Yasmin Harvey MD      haloperidol lactate  5 mg Intramuscular Q4H PRN Max 4/day Yasmin Harvey MD      And    LORazepam  2 mg Intramuscular Q4H PRN Max 4/day Yasmin Harvey MD      And    benztropine  1 mg Intramuscular Q4H PRN Max 4/day Yasmin Harvey MD      benztropine  1 mg Oral Q4H PRN Max 6/day Yasmin Harvey MD      bisacodyl  10 mg Rectal Daily PRN Yasmin Harvey MD      cloZAPine  450 mg Oral HS HOLLI Anderson      hydrOXYzine HCL  50 mg Oral Q6H PRN Max 4/day Yasmin Harvey MD      Or    diphenhydrAMINE  50 mg Intramuscular Q6H PRN Yasmin Harvey MD      diphenhydrAMINE-zinc acetate   Topical BID PRN Anjel Sagastume MD      escitalopram  20 mg Oral Daily La Flores MD      haloperidol  1 mg Oral Q6H PRN Yasmin Harvey MD      haloperidol  2.5 mg Oral Q4H PRN Max 4/day Yasmin Harvey MD      haloperidol   5 mg Oral Q4H PRN Max 4/day Yasmin Harvey MD      hydrocortisone   Topical 4x Daily PRN HOLLI Monge      hydrOXYzine HCL  100 mg Oral Q6H PRN Max 4/day Yasmin Harvey MD      Or    LORazepam  2 mg Intramuscular Q6H PRN Yasmin Harvey MD      hydrOXYzine HCL  25 mg Oral Q6H PRN Max 4/day Yasmin Harvey MD      lactated ringers  100 mL/hr Intravenous Continuous Elisa Workman MD      melatonin  3 mg Oral HS Yasmin Harvey MD      metFORMIN  500 mg Oral Daily With Breakfast HOLLI Monge      minocycline  100 mg Oral Q12H KAYLIE HOLLI Monge      mirtazapine  45 mg Oral HS La Flores MD      nicotine polacrilex  4 mg Oral Q2H PRN Yasmin Harvey MD      ondansetron  4 mg Intravenous Once PRN Elisa Workman MD      polyethylene glycol  17 g Oral Daily PRN Yasmin Harvey MD      polyethylene glycol  17 g Oral Daily HOLLI Monge      propranolol  10 mg Oral Q12H KAYLIE HOLLI Anderson      senna-docusate sodium  1 tablet Oral Daily PRN Yasmin Harvey MD      senna-docusate sodium  2 tablet Oral BID La Flores MD      traZODone  50 mg Oral HS PRN Yasmin Harvey MD      white petrolatum-mineral oil   Topical TID PRN Anjel Sagastume MD       Risks / Benefits of Treatment:    Risks, benefits, and possible side effects of medications explained to patient and patient verbalizes understanding and agreement for treatment.    Counseling / Coordination of Care:    Patient's progress discussed with staff in treatment team meeting.  Medications, treatment progress and treatment plan reviewed with patient.    HOLLI Anderson 02/28/24

## 2024-02-28 NOTE — NURSING NOTE
Received ECT # 1. Patient has slept all morning. Calm and cooperative. Refused breakfast , ate 100 % of lunch. VWNL. Medication and meal compliant. Denies any unmet needs at this time.

## 2024-02-28 NOTE — PROCEDURES
Procedure Note - ECT  Alberto Berumen 28 y.o. male MRN: [id number]    Time out was taken with staff to confirm correct patient and correct procedure to be performed. This was the patient's first session of ECT. The potential benefits and side effects of ECT, including, but not limited to memory loss and headache, were reviewed with patient who expressed understanding. Patient's questions and concerns were addressed. BL vs. RUL ECT were explained to patient who agreed to bilateral electrode placement.   Initial stimulus dose of 15% was selected based on modified age based method. Patient had a satisfactory seizure. No immediate side effects were noted after the procedure.    This procedure was performed in the presence of and under the direct supervision of Dr. Amaya.      Session Number: 001    Diagnosis: Principal Problem:    Schizoaffective disorder, bipolar type (Formerly [facility])  Active Problems:    GERD (gastroesophageal reflux disease)    Tobacco abuse    T wave inversion in EKG    Chronic idiopathic constipation    Vitamin B 12 deficiency    Vitamin D deficiency    Acanthosis nigricans    Rash      ECT Type: Inpatient    Anesthesia: Methohexital    Electrode Placement: Bilateral    Energy level:  15 %      Seizure Duration     EE Sec.    EMG : 34 Sec (visual)    Post-ictal Suppression Index: 11.4 %    Results:Clinical seizure was satisfactory, Patient tolerated ECT well      Vitals:    24 0915   BP: 116/72   Pulse: 92   Resp: 17   Temp:    SpO2:         Medication Administration - last 24 hours from 2024 0926 to 2024 0926         Date/Time Order Dose Route Action Action by     2024 2048 EST nicotine polacrilex (NICORETTE) gum 4 mg 4 mg Oral Given Courtney So RN     2024 1724 EST nicotine polacrilex (NICORETTE) gum 4 mg 4 mg Oral Given Mona Edwards     2024 1312 EST nicotine polacrilex (NICORETTE) gum 4 mg 4 mg Oral Given Jovanna Vega RN     2024 2146 EST  melatonin tablet 3 mg 3 mg Oral Given Mavis Covarrubias RN     02/28/2024 0821 EST acetaminophen (TYLENOL) tablet 975 mg 975 mg Oral Given Maxwell Bosch LPN     02/28/2024 0827 EST polyethylene glycol (MIRALAX) packet 17 g 17 g Oral Given Maxwell Bosch LPJOHN     02/27/2024 2146 EST mirtazapine (REMERON) tablet 45 mg 45 mg Oral Given Mavis Covarrubias RN     02/28/2024 0821 EST escitalopram (LEXAPRO) tablet 20 mg 20 mg Oral Given Maxwell Bosch LPJOHN     02/28/2024 0821 EST metFORMIN (GLUCOPHAGE) tablet 500 mg 500 mg Oral Given Maxwell Bosch LPJOHN     02/28/2024 0823 EST minocycline (MINOCIN) capsule 100 mg 100 mg Oral Given Maxwell Bosch LPJOHN     02/27/2024 2146 EST minocycline (MINOCIN) capsule 100 mg 100 mg Oral Given Mavis Covarrubias RN     02/28/2024 0823 EST propranolol (INDERAL) tablet 10 mg 10 mg Oral Given Maxwell Bosch LPN     02/27/2024 2146 EST propranolol (INDERAL) tablet 10 mg 10 mg Oral Given Mavis Covarrubias RN     02/27/2024 2146 EST atropine (ISOPTO ATROPINE) 1 % ophthalmic solution 1 drop 1 drop Sublingual Given Mavis Covarrubias RN     02/28/2024 0820 EST senna-docusate sodium (SENOKOT S) 8.6-50 mg per tablet 2 tablet 2 tablet Oral Given Maxwell Bosch LPN     02/27/2024 1722 EST senna-docusate sodium (SENOKOT S) 8.6-50 mg per tablet 2 tablet 2 tablet Oral Given Mona Brendonringer     02/27/2024 2146 EST cloZAPine (CLOZARIL) tablet 450 mg 450 mg Oral Given MIGUEL Rahman DO 02/28/24  Psychiatry Resident, PGY- I

## 2024-02-28 NOTE — PLAN OF CARE
Problem: PSYCHOSIS  Goal: Will report no hallucinations or delusions  Description: Interventions:  - Administer medication as  ordered  - Every waking shifts and PRN assess for the presence of hallucinations and or delusions  - Assist with reality testing to support increasing orientation  - Assess if patient's hallucinations or delusions are encouraging self-harm or harm to others and intervene as appropriate  2/28/2024 0055 by Mavis Covarrubias RN  Outcome: Not Progressing  2/28/2024 0055 by Mavis Covarrubias RN  Outcome: Progressing     Problem: ANXIETY  Goal: Will report anxiety at manageable levels  Description: INTERVENTIONS:  - Administer medication as ordered  - Teach and encourage coping skills  - Provide emotional support  - Assess patient/family for anxiety and ability to cope  2/28/2024 0055 by Mavis Covarrubias RN  Outcome: Progressing  2/28/2024 0055 by Mavis Covarrubias RN  Outcome: Progressing     Problem: Ineffective Coping  Goal: Participates in unit activities  Description: Interventions:  - Provide therapeutic environment   - Provide required programming   - Redirect inappropriate behaviors   2/28/2024 0055 by Mavis Covarrubias RN  Outcome: Progressing  2/28/2024 0055 by Mavis Covarrubias RN  Outcome: Progressing     Problem: Depression  Goal: Treatment Goal: Demonstrate behavioral control of depressive symptoms, verbalize feelings of improved mood/affect, and adopt new coping skills prior to discharge  2/28/2024 0055 by Mavis Covarrubias RN  Outcome: Progressing  2/28/2024 0055 by Mavis Covarrubias RN  Outcome: Progressing  Goal: Verbalize thoughts and feelings  Description: Interventions:  - Assess and re-assess patient's level of risk   - Engage patient in 1:1 interactions, daily, for a minimum of 15 minutes   - Encourage patient to express feelings, fears, frustrations, hopes   2/28/2024 0055 by Mavis Covarrubias RN  Outcome: Progressing  2/28/2024 0055 by Mavis Covarrubias RN  Outcome:  Progressing  Goal: Refrain from isolation  Description: Interventions:  - Develop a trusting relationship   - Encourage socialization   2/28/2024 0055 by Mavis Covarrubias RN  Outcome: Progressing  2/28/2024 0055 by Mavis Covarrubias RN  Outcome: Progressing  Goal: Refrain from self-neglect  2/28/2024 0055 by Mavis Covarrubias RN  Outcome: Progressing  2/28/2024 0055 by Mavsi Covarrubias RN  Outcome: Progressing

## 2024-02-28 NOTE — NURSING NOTE
Patient returned to unit from receiving ECT #1 at 0745. NL. Patient offers no complaints at this time.

## 2024-02-29 LAB — CLOZAPINE SERPL-MCNC: 635 NG/ML (ref 350–900)

## 2024-02-29 PROCEDURE — 80159 DRUG ASSAY CLOZAPINE: CPT | Performed by: NURSE PRACTITIONER

## 2024-02-29 PROCEDURE — 99232 SBSQ HOSP IP/OBS MODERATE 35: CPT | Performed by: PSYCHIATRY & NEUROLOGY

## 2024-02-29 RX ADMIN — NICOTINE POLACRILEX 4 MG: 4 GUM, CHEWING ORAL at 18:35

## 2024-02-29 RX ADMIN — CLOZAPINE 450 MG: 25 TABLET ORAL at 21:27

## 2024-02-29 RX ADMIN — ESCITALOPRAM OXALATE 20 MG: 10 TABLET ORAL at 08:30

## 2024-02-29 RX ADMIN — MINOCYCLINE HYDROCHLORIDE 100 MG: 100 CAPSULE ORAL at 20:57

## 2024-02-29 RX ADMIN — NICOTINE POLACRILEX 4 MG: 4 GUM, CHEWING ORAL at 15:44

## 2024-02-29 RX ADMIN — MINOCYCLINE HYDROCHLORIDE 100 MG: 100 CAPSULE ORAL at 08:29

## 2024-02-29 RX ADMIN — PROPRANOLOL HYDROCHLORIDE 10 MG: 10 TABLET ORAL at 20:57

## 2024-02-29 RX ADMIN — METFORMIN HYDROCHLORIDE 500 MG: 500 TABLET ORAL at 08:29

## 2024-02-29 RX ADMIN — PROPRANOLOL HYDROCHLORIDE 10 MG: 10 TABLET ORAL at 09:05

## 2024-02-29 RX ADMIN — SENNOSIDES AND DOCUSATE SODIUM 2 TABLET: 8.6; 5 TABLET ORAL at 17:32

## 2024-02-29 RX ADMIN — POLYETHYLENE GLYCOL 3350 17 G: 17 POWDER, FOR SOLUTION ORAL at 08:30

## 2024-02-29 RX ADMIN — NICOTINE POLACRILEX 4 MG: 4 GUM, CHEWING ORAL at 12:54

## 2024-02-29 RX ADMIN — MELATONIN TAB 3 MG 3 MG: 3 TAB at 21:28

## 2024-02-29 RX ADMIN — ATROPINE SULFATE 1 DROP: 10 SOLUTION/ DROPS OPHTHALMIC at 21:28

## 2024-02-29 RX ADMIN — MIRTAZAPINE 45 MG: 30 TABLET, FILM COATED ORAL at 21:28

## 2024-02-29 RX ADMIN — SENNOSIDES AND DOCUSATE SODIUM 2 TABLET: 8.6; 5 TABLET ORAL at 08:29

## 2024-02-29 RX ADMIN — NICOTINE POLACRILEX 4 MG: 4 GUM, CHEWING ORAL at 20:56

## 2024-02-29 NOTE — NURSING NOTE
Pt awake and alert, withdrawn to self and room, reports feeling overly tired this morning following first ECT session yesterday. Pt reports he will continue with ECT sessions. Pt denies SI/HI/VH. Pt continues to report AH of negative comments towards him and his family. Compliant with scheduled medications, declined breakfast this morning. Remains in behavioral control. Denies any unmet needs. Q7 minute safety checks maintained.

## 2024-02-29 NOTE — PROGRESS NOTES
Progress Note - Behavioral Health     Alberto Berumen 28 y.o. male MRN: [id number]   Unit/Bed#: [facility] 383-02 Encounter: [id number]    Documentation, nursing notes, medication reconciliation, labs, and vitals reviewed. Patient was seen for continuing care and reviewed with treatment team. No acute events over the past 24 hours.  Per nursing report, isolative to his room for much of the day yesterday.  Unhappy with how ECT made him feel.  Ongoing auditory hallucinations.    No medication changes over the past 24 hours.  Scheduled for clozapine level this evening.  Scheduled for ECT #2 tomorrow continues to tolerate current medications with no adverse effects.     On evaluation today, seen in his room and resting in bed.  Continues anxious about the way ECT made him feel.  However, states he will continue with ECT treatments as he is still hopeful that will help.  Continues to report ongoing auditory hallucinations threatening his family telling him they will go to hell.  No suicidal ideation, plan, or intent.  Ongoing perceptual disturbances and does not exhibit any symptoms of aimee on evaluation.    Psychiatric ROS:  Behavior over the last 24 hours: unchanged  Sleep: hypersomnia  Appetite: fair  Medication side effects: No   ROS: no complaints, all other systems are negative    Mental Status Evaluation:    Appearance:  marginal hygiene   Behavior:  cooperative, guarded   Speech:  scant   Mood:  depressed   Affect:  constricted   Thought Process:  decreased rate of thoughts   Associations: concrete associations   Thought Content:  some paranoia   Perceptual Disturbances: auditory hallucinations   Risk Potential: Suicidal ideation - None  Homicidal ideation - None  Potential for aggression - No   Sensorium:  oriented to person, place, and time/date   Memory:  recent and remote memory grossly intact   Consciousness:  alert and awake   Attention/Concentration: decreased concentration and decreased attention span    Insight:  limited   Judgment: limited   Gait/Station: normal gait/station, normal balance   Motor Activity: no abnormal movements     Vital signs in last 24 hours:    Temp:  [97.8 °F (36.6 °C)] 97.8 °F (36.6 °C)  HR:  [] 94  Resp:  [16-18] 18  BP: (125-160)/(72-80) 125/72    Laboratory results: I have personally reviewed all pertinent laboratory/tests results    Results from the past 24 hours: No results found for this or any previous visit (from the past 24 hour(s)).    Suicide/Homicide Risk Assessment:    Risk of Harm to Self:   Current Specific Risk Factors include: current depressive symptoms, current psychotic symptoms  Protective Factors: no current suicidal ideation, ability to communicate with staff on the unit, able to contract for safety on the unit, taking medications as ordered on the unit  Based on today's assessment, Alberto presents the following risk of harm to self: minimal    Risk of Harm to Others:  Current Specific Risk Factors include: current psychotic symptoms  Protective Factors: no current homicidal ideation, impulse control is improving, mood is stabilizing, psychotic symptoms are less prominent  Based on today's assessment, Alberto presents the following risk of harm to others: minimal    The following interventions are recommended: behavioral checks every 7 minutes, continued hospitalization on locked unit    Progress Toward Goals: limited improvement, still depressed, still psychotic    Assessment/Plan   Principal Problem:    Schizoaffective disorder, bipolar type (HCC)  Active Problems:    GERD (gastroesophageal reflux disease)    Tobacco abuse    T wave inversion in EKG    Chronic idiopathic constipation    Vitamin B 12 deficiency    Vitamin D deficiency    Acanthosis nigricans    Rash      Recommended Treatment:     Planned medication and treatment changes:    All current active medications have been reviewed  Encourage group therapy, milieu therapy and occupational  therapy  Behavioral Health checks every 7 minutes  Continue ECT #2 in the morning  Check clozapine level today  Monitor weekly CBC, CK, CRP, BNP, troponin, EKG every Monday for Clozaril monitoring    Continue current medications:    Current Facility-Administered Medications   Medication Dose Route Frequency Provider Last Rate    acetaminophen  650 mg Oral Q6H PRN Yasmin Harvey MD      acetaminophen  650 mg Oral Q4H PRN Yasmin Harvey MD      acetaminophen  975 mg Oral Q6H PRN Yasmin Harvey MD      aluminum-magnesium hydroxide-simethicone  30 mL Oral Q4H PRN Yasmin Harvey MD      atropine  1 drop Sublingual HS HOLLI Anderson      atropine  1 drop Sublingual Daily PRN HOLLI Anderson      haloperidol lactate  2.5 mg Intramuscular Q4H PRN Max 4/day Yasmin Harvey MD      And    LORazepam  1 mg Intramuscular Q4H PRN Max 4/day Yasmin Harvey MD      And    benztropine  0.5 mg Intramuscular Q4H PRN Max 4/day Yasmin Harvey MD      haloperidol lactate  5 mg Intramuscular Q4H PRN Max 4/day Yasmin Harvey MD      And    LORazepam  2 mg Intramuscular Q4H PRN Max 4/day Yasmin Harvey MD      And    benztropine  1 mg Intramuscular Q4H PRN Max 4/day Yasmin Harvey MD      benztropine  1 mg Oral Q4H PRN Max 6/day Yasmin Harvey MD      bisacodyl  10 mg Rectal Daily PRN Yasmin Harvey MD      cloZAPine  450 mg Oral HS HOLLI Anderson      hydrOXYzine HCL  50 mg Oral Q6H PRN Max 4/day Yasmin Harvey MD      Or    diphenhydrAMINE  50 mg Intramuscular Q6H PRN Yasmin Harvey MD      diphenhydrAMINE-zinc acetate   Topical BID PRN Anjel Sagastume MD      escitalopram  20 mg Oral Daily La Flores MD      haloperidol  1 mg Oral Q6H PRN Yasmin Harvey MD      haloperidol  2.5 mg Oral Q4H PRN Max 4/day Yasmin Harvey MD      haloperidol  5 mg Oral Q4H PRN Max 4/day Yasmin Harvey MD      hydrocortisone   Topical 4x Daily PRN Eileen Jensen, STEVENP      hydrOXYzine HCL   100 mg Oral Q6H PRN Max 4/day Yasmin Harvey MD      Or    LORazepam  2 mg Intramuscular Q6H PRN Yasmin Harvey MD      hydrOXYzine HCL  25 mg Oral Q6H PRN Max 4/day Yasmin Harvey MD      lactated ringers  100 mL/hr Intravenous Continuous Elisa Workman MD      melatonin  3 mg Oral HS Yasmin Harvey MD      metFORMIN  500 mg Oral Daily With Breakfast Eileen HOLLI Higuera      methocarbamol  500 mg Oral Q6H PRN HOLLI Monge      minocycline  100 mg Oral Q12H KAYLIE EileenHOLLI Cummins      mirtazapine  45 mg Oral HS La Flores MD      nicotine polacrilex  4 mg Oral Q2H PRN Yasmin Harvey MD      polyethylene glycol  17 g Oral Daily PRN Yasmin Harvey MD      polyethylene glycol  17 g Oral Daily EileenHOLLI Cummins      propranolol  10 mg Oral Q12H [facility] HOLLI Anderson      senna-docusate sodium  1 tablet Oral Daily PRN Yasmin Harvey MD      senna-docusate sodium  2 tablet Oral BID La Flores MD      traZODone  50 mg Oral HS PRN Yasmin Harvey MD      white petrolatum-mineral oil   Topical TID PRN Anjel Sagastume MD       Risks / Benefits of Treatment:    Risks, benefits, and possible side effects of medications explained to patient and patient verbalizes understanding and agreement for treatment.  Discussed risks and benefits of Electroconvulsive Treatment with patient including risks of anesthesia and memory loss. Alberto verbalized understanding and agreed for ECT.    Counseling / Coordination of Care:    Patient's progress discussed with staff in treatment team meeting.  Medications, treatment progress and treatment plan reviewed with patient.    HOLLI Anderson 02/29/24

## 2024-02-29 NOTE — PROGRESS NOTES
02/29/24 1153   Team Meeting   Meeting Type Daily Rounds   Team Members Present   Team Members Present Physician;Nurse;   Physician Team Member Mark   Nursing Team Member Giancarlo   Care Management Team Member Miguelito   Patient/Family Present   Patient Present No   Patient's Family Present No     Patient currently holds 201 status. Patient reports continued AH. Patient is medication and meal compliant. Patient to continue on all current scheduled medications. Patient began ECT yesterday. Patient awaiting EAC placement.

## 2024-02-29 NOTE — NURSING NOTE
"Patient was in their bed about half way through the evening, then more visible out of their room after dinner. Encouraged to shower and they did. Reports some depression and did not do anything to try to overcome it today due to how the ECT made them feel. Patient also reports auditory hallucinations that \"they are going to kill me and my family\". Patient instructed to come to staff if thoughts worsen or scheduled night time medications do not help them.    Medication compliant. When patient was made aware of scheduled lab work tomorrow evening patient questioned why. Denies any unmet needs or concerns at this time.   "

## 2024-02-29 NOTE — PLAN OF CARE
Problem: PSYCHOSIS  Goal: Will report no hallucinations or delusions  Description: Interventions:  - Administer medication as  ordered  - Every waking shifts and PRN assess for the presence of hallucinations and or delusions  - Assist with reality testing to support increasing orientation  - Assess if patient's hallucinations or delusions are encouraging self-harm or harm to others and intervene as appropriate  Outcome: Progressing     Problem: ANXIETY  Goal: Will report anxiety at manageable levels  Description: INTERVENTIONS:  - Administer medication as ordered  - Teach and encourage coping skills  - Provide emotional support  - Assess patient/family for anxiety and ability to cope  Outcome: Progressing     Problem: Ineffective Coping  Goal: Participates in unit activities  Description: Interventions:  - Provide therapeutic environment   - Provide required programming   - Redirect inappropriate behaviors   Outcome: Progressing     Problem: Depression  Goal: Treatment Goal: Demonstrate behavioral control of depressive symptoms, verbalize feelings of improved mood/affect, and adopt new coping skills prior to discharge  Outcome: Progressing  Goal: Verbalize thoughts and feelings  Description: Interventions:  - Assess and re-assess patient's level of risk   - Engage patient in 1:1 interactions, daily, for a minimum of 15 minutes   - Encourage patient to express feelings, fears, frustrations, hopes   Outcome: Progressing  Goal: Refrain from isolation  Description: Interventions:  - Develop a trusting relationship   - Encourage socialization   Outcome: Progressing  Goal: Refrain from self-neglect  Outcome: Progressing     Problem: Electroconvulsive therapy (ECT)  Goal: Treatment Goal: Demonstrate a reduction of confusion and improved orientation to person, place, time and/or situation upon discharge, according to optimum baseline/ability  Outcome: Progressing  Goal: Verbalizes/displays adequate comfort level or  baseline comfort level  Description: Interventions:  - Encourage patient to monitor pain and request assistance  - Assess pain using appropriate pain scale  - Administer analgesics based on type and severity of pain and evaluate response  - Implement non-pharmacological measures as appropriate and evaluate response  - Consider cultural and social influences on pain and pain management  - Notify physician/advanced practitioner if interventions unsuccessful or patient reports new pain  Outcome: Progressing  Goal: Achieves stable or improved neurological status  Description: INTERVENTIONS  - Monitor and report changes in neurological status  - Monitor vital signs such as temperature, blood pressure, glucose, and any other labs ordered   - Initiate measures to prevent increased intracranial pressure  - Monitor for seizure activity and implement precautions if appropriate      Outcome: Progressing  Goal: Achieves maximal functionality and self care  Description: INTERVENTIONS  - Monitor swallowing and airway patency with patient fatigue and changes in neurological status  - Encourage and assist patient to increase activity and self care.   - Encourage visually impaired, hearing impaired and aphasic patients to use assistive/communication devices  Outcome: Progressing  Goal: Maintain or return mobility to safest level of function  Description: INTERVENTIONS:  - Assess patient's ability to carry out ADLs; assess patient's baseline for ADL function and identify physical deficits which impact ability to perform ADLs (bathing, care of mouth/teeth, toileting, grooming, dressing, etc.)  - Assess/evaluate cause of self-care deficits   - Assess range of motion  - Assess patient's mobility  - Assess patient's need for assistive devices and provide as appropriate  - Encourage maximum independence but intervene and supervise when necessary  - Involve family in performance of ADLs  - Assess for home care needs following discharge   -  Consider OT consult to assist with ADL evaluation and planning for discharge  - Provide patient education as appropriate  Outcome: Progressing  Goal: Absence of urinary retention  Description: INTERVENTIONS:  - Assess patient’s ability to void and empty bladder  - Monitor I/O  - Bladder scan as needed  - Discuss with physician/AP medications to alleviate retention as needed  - Discuss catheterization for long term situations as appropriate  Outcome: Progressing  Goal: Minimal or absence of nausea and/or vomiting  Description: INTERVENTIONS:  - Administer IV fluids if ordered to ensure adequate hydration  - Maintain NPO status until nausea and vomiting are resolved  - Nasogastric tube if ordered  - Administer ordered antiemetic medications as needed  - Provide nonpharmacologic comfort measures as appropriate  - Advance diet as tolerated, if ordered  - Consider nutrition services referral to assist patient with adequate nutrition and appropriate food choices  Outcome: Progressing  Goal: Maintains adequate nutritional intake  Description: INTERVENTIONS:  - Monitor percentage of each meal consumed  - Identify factors contributing to decreased intake, treat as appropriate  - Assist with meals as needed  - Monitor I&O, weight, and lab values if indicated  - Obtain nutrition services referral as needed  Outcome: Progressing

## 2024-03-01 ENCOUNTER — APPOINTMENT (INPATIENT)
Dept: PREOP/PACU | Facility: HOSPITAL | Age: 29
DRG: 750 | End: 2024-03-01
Attending: STUDENT IN AN ORGANIZED HEALTH CARE EDUCATION/TRAINING PROGRAM
Payer: COMMERCIAL

## 2024-03-01 ENCOUNTER — ANESTHESIA (INPATIENT)
Dept: PREOP/PACU | Facility: HOSPITAL | Age: 29
End: 2024-03-01
Payer: COMMERCIAL

## 2024-03-01 ENCOUNTER — ANESTHESIA EVENT (INPATIENT)
Dept: PREOP/PACU | Facility: HOSPITAL | Age: 29
End: 2024-03-01
Payer: COMMERCIAL

## 2024-03-01 PROCEDURE — GZB2ZZZ ELECTROCONVULSIVE THERAPY, BILATERAL-SINGLE SEIZURE: ICD-10-PCS | Performed by: STUDENT IN AN ORGANIZED HEALTH CARE EDUCATION/TRAINING PROGRAM

## 2024-03-01 PROCEDURE — 90870 ELECTROCONVULSIVE THERAPY: CPT | Performed by: STUDENT IN AN ORGANIZED HEALTH CARE EDUCATION/TRAINING PROGRAM

## 2024-03-01 RX ORDER — ESMOLOL HYDROCHLORIDE 10 MG/ML
INJECTION INTRAVENOUS AS NEEDED
Status: DISCONTINUED | OUTPATIENT
Start: 2024-03-01 | End: 2024-03-01

## 2024-03-01 RX ORDER — METHOHEXITAL IN WATER/PF 100MG/10ML
SYRINGE (ML) INTRAVENOUS AS NEEDED
Status: DISCONTINUED | OUTPATIENT
Start: 2024-03-01 | End: 2024-03-01

## 2024-03-01 RX ORDER — GLYCOPYRROLATE 0.2 MG/ML
INJECTION INTRAMUSCULAR; INTRAVENOUS AS NEEDED
Status: DISCONTINUED | OUTPATIENT
Start: 2024-03-01 | End: 2024-03-01

## 2024-03-01 RX ORDER — SUCCINYLCHOLINE/SOD CL,ISO/PF 100 MG/5ML
SYRINGE (ML) INTRAVENOUS AS NEEDED
Status: DISCONTINUED | OUTPATIENT
Start: 2024-03-01 | End: 2024-03-01

## 2024-03-01 RX ADMIN — METFORMIN HYDROCHLORIDE 500 MG: 500 TABLET ORAL at 08:17

## 2024-03-01 RX ADMIN — MIRTAZAPINE 45 MG: 30 TABLET, FILM COATED ORAL at 21:19

## 2024-03-01 RX ADMIN — GLYCOPYRROLATE 0.2 MG: 0.2 INJECTION INTRAMUSCULAR; INTRAVENOUS at 06:47

## 2024-03-01 RX ADMIN — MINOCYCLINE HYDROCHLORIDE 100 MG: 100 CAPSULE ORAL at 21:19

## 2024-03-01 RX ADMIN — ESMOLOL HYDROCHLORIDE 50 MG: 10 INJECTION, SOLUTION INTRAVENOUS at 06:57

## 2024-03-01 RX ADMIN — NICOTINE POLACRILEX 4 MG: 4 GUM, CHEWING ORAL at 19:58

## 2024-03-01 RX ADMIN — NICOTINE POLACRILEX 4 MG: 4 GUM, CHEWING ORAL at 14:37

## 2024-03-01 RX ADMIN — MINOCYCLINE HYDROCHLORIDE 100 MG: 100 CAPSULE ORAL at 08:17

## 2024-03-01 RX ADMIN — Medication 140 MG: at 06:52

## 2024-03-01 RX ADMIN — ACETAMINOPHEN 650 MG: 325 TABLET ORAL at 20:15

## 2024-03-01 RX ADMIN — ATROPINE SULFATE 1 DROP: 10 SOLUTION/ DROPS OPHTHALMIC at 21:19

## 2024-03-01 RX ADMIN — MELATONIN TAB 3 MG 3 MG: 3 TAB at 21:19

## 2024-03-01 RX ADMIN — SENNOSIDES AND DOCUSATE SODIUM 2 TABLET: 8.6; 5 TABLET ORAL at 17:04

## 2024-03-01 RX ADMIN — NICOTINE POLACRILEX 4 MG: 4 GUM, CHEWING ORAL at 17:04

## 2024-03-01 RX ADMIN — METHOHEXITAL SODIUM 140 MG: 500 INJECTION, POWDER, LYOPHILIZED, FOR SOLUTION INTRAMUSCULAR; INTRAVENOUS; RECTAL at 06:52

## 2024-03-01 RX ADMIN — ACETAMINOPHEN 975 MG: 325 TABLET ORAL at 08:16

## 2024-03-01 RX ADMIN — PROPRANOLOL HYDROCHLORIDE 10 MG: 10 TABLET ORAL at 21:19

## 2024-03-01 RX ADMIN — ESCITALOPRAM OXALATE 20 MG: 10 TABLET ORAL at 08:17

## 2024-03-01 RX ADMIN — PROPRANOLOL HYDROCHLORIDE 10 MG: 10 TABLET ORAL at 08:17

## 2024-03-01 RX ADMIN — CLOZAPINE 450 MG: 25 TABLET ORAL at 21:19

## 2024-03-01 RX ADMIN — POLYETHYLENE GLYCOL 3350 17 G: 17 POWDER, FOR SOLUTION ORAL at 08:18

## 2024-03-01 RX ADMIN — SENNOSIDES AND DOCUSATE SODIUM 2 TABLET: 8.6; 5 TABLET ORAL at 08:17

## 2024-03-01 RX ADMIN — SODIUM CHLORIDE, SODIUM LACTATE, POTASSIUM CHLORIDE, AND CALCIUM CHLORIDE: .6; .31; .03; .02 INJECTION, SOLUTION INTRAVENOUS at 06:58

## 2024-03-01 NOTE — NURSING NOTE
"Pt resting quietly in bed following ECT#2 this morning, no outwards signs of distress. Pt c/o generalized body aches, requested and received PRN Tylenol 975mg at 0816 with positive effect. Pt became slightly irritable with MHT this morning due to frequent vital sign checks following ECT. Pt educated on importance of monitoring VS after ECT treatment, pt replied \"I can't do this shit if this is what I have to do every time\". Support provided. Pt declined breakfast, stated he just wanted to sleep. Denies SI/HI/VH at this time, continues to report AH which he states are unchanged. Pt compliant with scheduled medications. Denies any unmet needs at this time. Q7 minute safety checks maintained.   "

## 2024-03-01 NOTE — PROGRESS NOTES
03/01/24 1524   Team Meeting   Meeting Type Daily Rounds   Team Members Present   Team Members Present Physician;Nurse;   Physician Team Member Mark   Nursing Team Member Giancarlo   Care Management Team Member Miguelito   Patient/Family Present   Patient Present No   Patient's Family Present No     Patient currently holds 201 status. Patient continue to report AH. Patient is medication and meal compliant. Patient to continue on all current scheduled medications. Patient to have ECT session #2 today. Patient discharge date and time is to be determined.

## 2024-03-01 NOTE — PROGRESS NOTES
Progress Note - Behavioral Health     Alberto Berumen 28 y.o. male MRN: [id number]   Unit/Bed#: [facility] 383-02 Encounter: [id number]    Documentation, nursing notes, medication reconciliation, labs, and vitals reviewed. Patient was seen for continuing care and reviewed with treatment team. No acute events over the past 24 hours.  Per nursing report, has been resting in bed post ECT.  Some irritability with vital signs post-ECT.  Complaining of body aches post-ECT.  Continues to report no improvement in auditory hallucinations.  No medication changes over the past 24 hours.  ECT #3 is scheduled for Monday.  Continues to tolerate current medications with no adverse effects.     On evaluation today, seen in his room and resting in bed.  Irritable edge.  Feeling better post-ECT at this time.  Continues to report no improvement in auditory hallucinations, still threatening him and his family and telling him they will all go to hell.  Presenting more depressed.  Some frustration with ECT but verbalizes he will continue.  No suicidal ideation, plan, or intent.  Ongoing perceptual disturbances and does not exhibit any symptoms of aimee on evaluation.    Psychiatric ROS:  Behavior over the last 24 hours: unchanged  Sleep: slept off and on  Appetite: fair  Medication side effects: No   ROS: reports body aches, all other systems are negative    Mental Status Evaluation:    Appearance:  marginal hygiene   Behavior:  cooperative, guarded   Speech:  scant   Mood:  dysphoric   Affect:  blunted   Thought Process:  goal directed   Associations: concrete associations   Thought Content:  some paranoia   Perceptual Disturbances: auditory hallucinations   Risk Potential: Suicidal ideation - None  Homicidal ideation - None  Potential for aggression - No   Sensorium:  oriented to person, place, and time/date   Memory:  recent and remote memory grossly intact   Consciousness:  alert and awake   Attention/Concentration: decreased concentration  and decreased attention span   Insight:  limited   Judgment: limited   Gait/Station: normal gait/station, normal balance   Motor Activity: no abnormal movements     Vital signs in last 24 hours:    Temp:  [97.5 °F (36.4 °C)-98.6 °F (37 °C)] 98.6 °F (37 °C)  HR:  [] 80  Resp:  [16-20] 16  BP: (111-201)/(58-98) 132/67    Laboratory results: I have personally reviewed all pertinent laboratory/tests results    Results from the past 24 hours:   Recent Results (from the past 24 hour(s))   Clozapine[facility]    Collection Time: 02/29/24  8:19 PM   Result Value Ref Range    Clozapine Lvl 635 350 - 900 ng/mL       Suicide/Homicide Risk Assessment:    Risk of Harm to Self:   Current Specific Risk Factors include: current depressive symptoms, current psychotic symptoms  Protective Factors: improved mood  Based on today's assessment, Alberto presents the following risk of harm to self: minimal    Risk of Harm to Others:  Current Specific Risk Factors include: current psychotic symptoms  Protective Factors: no current homicidal ideation, impulse control is improving, mood is stabilizing, psychotic symptoms are less prominent, compliant with medications on the unit as ordered  Based on today's assessment, Alberto presents the following risk of harm to others: minimal    The following interventions are recommended: behavioral checks every 7 minutes, continued hospitalization on locked unit    Progress Toward Goals: progressing    Assessment/Plan   Principal Problem:    Schizoaffective disorder, bipolar type (HCC)  Active Problems:    GERD (gastroesophageal reflux disease)    Tobacco abuse    T wave inversion in EKG    Chronic idiopathic constipation    Vitamin B 12 deficiency    Vitamin D deficiency    Acanthosis nigricans    Rash      Recommended Treatment:     Planned medication and treatment changes:    All current active medications have been reviewed  Encourage group therapy, milieu therapy and occupational  therapy  Behavioral Health checks every 7 minutes  Extended Acute Care referral  Continue ECT #3 on Monday  Requires continued inpatient treatment due to chronic illness and high risk of decompensation if discharged before long term stability is achieved    Continue current Clozaril dose of 450 mg at bedtime. Clozapine level is therapeutic at 635    Monitor CBC/diff, CPK, C-reactive protein, BNP, troponin, and ECG weekly    Continue current medications.    Current Facility-Administered Medications   Medication Dose Route Frequency Provider Last Rate    acetaminophen  650 mg Oral Q6H PRN Yasmin Harvey MD      acetaminophen  650 mg Oral Q4H PRN Yasmin Harvey MD      acetaminophen  975 mg Oral Q6H PRN Yasmin Harvey MD      aluminum-magnesium hydroxide-simethicone  30 mL Oral Q4H PRN Yasmin Harvey MD      atropine  1 drop Sublingual HS HOLLI Anderson      atropine  1 drop Sublingual Daily PRN HOLLI Anderson      haloperidol lactate  2.5 mg Intramuscular Q4H PRN Max 4/day Yasmin Harvey MD      And    LORazepam  1 mg Intramuscular Q4H PRN Max 4/day Yasmin Harvey MD      And    benztropine  0.5 mg Intramuscular Q4H PRN Max 4/day Yasmin Harvey MD      haloperidol lactate  5 mg Intramuscular Q4H PRN Max 4/day Yasmin Harvey MD      And    LORazepam  2 mg Intramuscular Q4H PRN Max 4/day Yasmin Harvey MD      And    benztropine  1 mg Intramuscular Q4H PRN Max 4/day Yasmin Harvey MD      benztropine  1 mg Oral Q4H PRN Max 6/day Yasmin Harvey MD      bisacodyl  10 mg Rectal Daily PRN Yasmin Harvey MD      cloZAPine  450 mg Oral HS HOLLI Anderson      hydrOXYzine HCL  50 mg Oral Q6H PRN Max 4/day Yasmin Harvey MD      Or    diphenhydrAMINE  50 mg Intramuscular Q6H PRN Yasmin Harvey MD      diphenhydrAMINE-zinc acetate   Topical BID PRN Anjel Sagastume MD      escitalopram  20 mg Oral Daily La Flores MD      haloperidol  1 mg Oral Q6H PRN Yasmin Harvey MD       haloperidol  2.5 mg Oral Q4H PRN Max 4/day Yasmin Harvey MD      haloperidol  5 mg Oral Q4H PRN Max 4/day Yasmin Harvey MD      hydrocortisone   Topical 4x Daily PRN HOLLI Monge      hydrOXYzine HCL  100 mg Oral Q6H PRN Max 4/day Yasmin Harvey MD      Or    LORazepam  2 mg Intramuscular Q6H PRN Yasmin Harvey MD      hydrOXYzine HCL  25 mg Oral Q6H PRN Max 4/day Yasmin Harvey MD      lactated ringers  100 mL/hr Intravenous Continuous Akaafelecia Workman MD      melatonin  3 mg Oral HS Yasmin Harvey MD      metFORMIN  500 mg Oral Daily With Breakfast HOLLI Monge      methocarbamol  500 mg Oral Q6H PRN HOLLI Monge      minocycline  100 mg Oral Q12H KAYLIE HOLLI Monge      mirtazapine  45 mg Oral HS La Flores MD      nicotine polacrilex  4 mg Oral Q2H PRN Yasmin Harvey MD      polyethylene glycol  17 g Oral Daily PRN Yasmin Harvey MD      polyethylene glycol  17 g Oral Daily HOLLI Monge      propranolol  10 mg Oral Q12H [facility] HOLLI Anderson      senna-docusate sodium  1 tablet Oral Daily PRN Yasmin Harvey MD      senna-docusate sodium  2 tablet Oral BID La Flores MD      traZODone  50 mg Oral HS PRN Yasmin Harvey MD      white petrolatum-mineral oil   Topical TID PRN Anjel Sagastume MD       Risks / Benefits of Treatment:    Risks, benefits, and possible side effects of medications explained to patient and patient verbalizes understanding and agreement for treatment.  Discussed risks and benefits of Electroconvulsive Treatment with patient including risks of anesthesia and memory loss. Alberto verbalized understanding and agreed for ECT.    Counseling / Coordination of Care:    Patient's progress discussed with staff in treatment team meeting.  Medications, treatment progress and treatment plan reviewed with patient.    HOLLI Anderson 03/01/24

## 2024-03-01 NOTE — NURSING NOTE
Patient escorted to PACU via wheelchair with  and RN. SONIA. Patient maintained NPO after midnight.

## 2024-03-01 NOTE — PROCEDURES
Procedure Note - ECT  Alberto Berumen 28 y.o. male MRN: [id number]    Patient is guarded but pleasant on approach.  He reports tolerating initial ECT, though has had body aches.  He denies any headaches or memory impairment.  He reports that he is still having auditory hallucinations that are frustrating to him.  He denies any SI, HI, or visual hallucinations.  Patient advised that it may take several sessions before he notices significant improvement and patient expressed understanding of this.  Patient agreeable to proceed with ECT treatment today.    Time out was taken with staff to confirm correct patient and correct procedure to be performed.    Session Number: 002    Diagnosis: Principal Problem:    Schizoaffective disorder, bipolar type ([facility])  Active Problems:    GERD (gastroesophageal reflux disease)    Tobacco abuse    T wave inversion in EKG    Chronic idiopathic constipation    Vitamin B 12 deficiency    Vitamin D deficiency    Acanthosis nigricans    Rash      ECT Type: Inpatient, Acute    Anesthesia: Brevital    Electrode Placement: bilateral    Energy level: 15%      Seizure Duration     EE sec per review of EEG, 49 sec detected by machine  EM sec observed, not detected by machine    PSI 46.0%     Results:Clinical seizure was satisfactory, Patient tolerated ECT well    Vitals:    24 0720   BP: 162/91   Pulse: 94   Resp: 20   Temp:    SpO2: 100%        Medication Administration - last 24 hours from 2024 0721 to 2024 0721         Date/Time Order Dose Route Action Action by     2024 2056 EST nicotine polacrilex (NICORETTE) gum 4 mg 4 mg Oral Given Mavis Covarrubias RN     2024 1835 EST nicotine polacrilex (NICORETTE) gum 4 mg 4 mg Oral Given Jen Rosario LPN     2024 1544 EST nicotine polacrilex (NICORETTE) gum 4 mg 4 mg Oral Given Jen Rosario LPN     2024 1254 EST nicotine polacrilex (NICORETTE) gum 4 mg 4 mg Oral Given Jen Rosario LPN      02/29/2024 2128 EST melatonin tablet 3 mg 3 mg Oral Given Mavis Covarrubias, MIGUEL     02/29/2024 0830 EST polyethylene glycol (MIRALAX) packet 17 g 17 g Oral Given Maxwell Bosch LPJOHN     02/29/2024 2128 EST mirtazapine (REMERON) tablet 45 mg 45 mg Oral Given Mavis Covarrubias RN     02/29/2024 0830 EST escitalopram (LEXAPRO) tablet 20 mg 20 mg Oral Given Maxwell Bosch, LPN     02/29/2024 0829 EST metFORMIN (GLUCOPHAGE) tablet 500 mg 500 mg Oral Given Maxwell Bosch, LPN     02/29/2024 2057 EST minocycline (MINOCIN) capsule 100 mg 100 mg Oral Given Mavis Covarrubias RN     02/29/2024 0829 EST minocycline (MINOCIN) capsule 100 mg 100 mg Oral Given Maxwell Bosch LPN     02/29/2024 2057 EST propranolol (INDERAL) tablet 10 mg 10 mg Oral Given Mavis Covarrubias RN     02/29/2024 0905 EST propranolol (INDERAL) tablet 10 mg 10 mg Oral Given Maxwell Bosch, LPN     02/29/2024 2128 EST atropine (ISOPTO ATROPINE) 1 % ophthalmic solution 1 drop 1 drop Sublingual Given Mavis Covarrubias RN     02/29/2024 1732 EST senna-docusate sodium (SENOKOT S) 8.6-50 mg per tablet 2 tablet 2 tablet Oral Given Jen Rosario, LPJOHN     02/29/2024 0829 EST senna-docusate sodium (SENOKOT S) 8.6-50 mg per tablet 2 tablet 2 tablet Oral Given Maxwell Bosch, LPJOHN     02/29/2024 2127 EST cloZAPine (CLOZARIL) tablet 450 mg 450 mg Oral Given Mavis Covarrubias RN             Media Information      Document Information    Clinical Image - Mobile Device   EEG   03/01/2024 06:56   Attached To:   Hospital Encounter on 11/14/23   Source Information    uGy Arroyo MD  [facility]     Guy Arroyo MD 03/01/24

## 2024-03-01 NOTE — ANESTHESIA POSTPROCEDURE EVALUATION
Post-Op Assessment Note    CV Status:  Stable  Pain Score: 0    Pain management: adequate       Mental Status:  Alert and awake   Hydration Status:  Euvolemic   PONV Controlled:  Controlled   Airway Patency:  Patent     Post Op Vitals Reviewed: Yes    No anethesia notable event occurred.    Staff: CRNA               BP   192/102   Temp   98   Pulse  77   Resp   16   SpO2   98

## 2024-03-01 NOTE — ANESTHESIA PREPROCEDURE EVALUATION
Procedure:  ELECTROCONVULSIVE THERAPY (ECT)    Relevant Problems   GI/HEPATIC   (+) GERD (gastroesophageal reflux disease)        Physical Exam    Airway    Mallampati score: I  TM Distance: >3 FB  Neck ROM: full     Dental       Cardiovascular  Cardiovascular exam normal    Pulmonary  Pulmonary exam normal     Other Findings        Anesthesia Plan  ASA Score- 2     Anesthesia Type- IV sedation with anesthesia with ASA Monitors.         Additional Monitors:     Airway Plan:            Plan Factors-Exercise tolerance (METS): >4 METS.    Chart reviewed. EKG reviewed. Imaging results reviewed. Existing labs reviewed. Patient summary reviewed.                  Induction- intravenous.    Postoperative Plan- Plan for postoperative opioid use. Planned trial extubation    Informed Consent- Anesthetic plan and risks discussed with patient.  I personally reviewed this patient with the CRNA. Discussed and agreed on the Anesthesia Plan with the CRNA..

## 2024-03-01 NOTE — NURSING NOTE
"Patient is visible on unit, watching tv in dayroom and socializing with peers. Patient continues to endorse feeling tired when asked about first ECT session, patient stated \"It took a lot out of me, I'm just so tired\". Patient reports feeling \"ok\" otherwise. Patient continues to endorse auditory hallucinations, denies visual hallucinations, SI/HI, depression and anxiety. Patient reminded about ECT prep requirement of NPO after midnight and agreed to comply Patient remains calm, cooperative and compliant with medications and labwork. No complaints or unmet needs identified at this time. Q 7 minute safety checks maintained.   "

## 2024-03-01 NOTE — PLAN OF CARE
Problem: ANXIETY  Goal: Will report anxiety at manageable levels  Description: INTERVENTIONS:  - Administer medication as ordered  - Teach and encourage coping skills  - Provide emotional support  - Assess patient/family for anxiety and ability to cope  Outcome: Progressing     Problem: Ineffective Coping  Goal: Participates in unit activities  Description: Interventions:  - Provide therapeutic environment   - Provide required programming   - Redirect inappropriate behaviors   Outcome: Progressing     Problem: Depression  Goal: Treatment Goal: Demonstrate behavioral control of depressive symptoms, verbalize feelings of improved mood/affect, and adopt new coping skills prior to discharge  Outcome: Progressing  Goal: Verbalize thoughts and feelings  Description: Interventions:  - Assess and re-assess patient's level of risk   - Engage patient in 1:1 interactions, daily, for a minimum of 15 minutes   - Encourage patient to express feelings, fears, frustrations, hopes   Outcome: Progressing  Goal: Refrain from isolation  Description: Interventions:  - Develop a trusting relationship   - Encourage socialization   Outcome: Progressing  Goal: Refrain from self-neglect  Outcome: Progressing     Problem: Electroconvulsive therapy (ECT)  Goal: Treatment Goal: Demonstrate a reduction of confusion and improved orientation to person, place, time and/or situation upon discharge, according to optimum baseline/ability  Outcome: Progressing  Goal: Verbalizes/displays adequate comfort level or baseline comfort level  Description: Interventions:  - Encourage patient to monitor pain and request assistance  - Assess pain using appropriate pain scale  - Administer analgesics based on type and severity of pain and evaluate response  - Implement non-pharmacological measures as appropriate and evaluate response  - Consider cultural and social influences on pain and pain management  - Notify physician/advanced practitioner if interventions  unsuccessful or patient reports new pain  Outcome: Progressing  Goal: Achieves stable or improved neurological status  Description: INTERVENTIONS  - Monitor and report changes in neurological status  - Monitor vital signs such as temperature, blood pressure, glucose, and any other labs ordered   - Initiate measures to prevent increased intracranial pressure  - Monitor for seizure activity and implement precautions if appropriate      Outcome: Progressing  Goal: Achieves maximal functionality and self care  Description: INTERVENTIONS  - Monitor swallowing and airway patency with patient fatigue and changes in neurological status  - Encourage and assist patient to increase activity and self care.   - Encourage visually impaired, hearing impaired and aphasic patients to use assistive/communication devices  Outcome: Progressing  Goal: Maintain or return mobility to safest level of function  Description: INTERVENTIONS:  - Assess patient's ability to carry out ADLs; assess patient's baseline for ADL function and identify physical deficits which impact ability to perform ADLs (bathing, care of mouth/teeth, toileting, grooming, dressing, etc.)  - Assess/evaluate cause of self-care deficits   - Assess range of motion  - Assess patient's mobility  - Assess patient's need for assistive devices and provide as appropriate  - Encourage maximum independence but intervene and supervise when necessary  - Involve family in performance of ADLs  - Assess for home care needs following discharge   - Consider OT consult to assist with ADL evaluation and planning for discharge  - Provide patient education as appropriate  Outcome: Progressing  Goal: Absence of urinary retention  Description: INTERVENTIONS:  - Assess patient’s ability to void and empty bladder  - Monitor I/O  - Bladder scan as needed  - Discuss with physician/AP medications to alleviate retention as needed  - Discuss catheterization for long term situations as  appropriate  Outcome: Progressing  Goal: Minimal or absence of nausea and/or vomiting  Description: INTERVENTIONS:  - Administer IV fluids if ordered to ensure adequate hydration  - Maintain NPO status until nausea and vomiting are resolved  - Nasogastric tube if ordered  - Administer ordered antiemetic medications as needed  - Provide nonpharmacologic comfort measures as appropriate  - Advance diet as tolerated, if ordered  - Consider nutrition services referral to assist patient with adequate nutrition and appropriate food choices  Outcome: Progressing  Goal: Maintains adequate nutritional intake  Description: INTERVENTIONS:  - Monitor percentage of each meal consumed  - Identify factors contributing to decreased intake, treat as appropriate  - Assist with meals as needed  - Monitor I&O, weight, and lab values if indicated  - Obtain nutrition services referral as needed  Outcome: Progressing     Problem: PSYCHOSIS  Goal: Will report no hallucinations or delusions  Description: Interventions:  - Administer medication as  ordered  - Every waking shifts and PRN assess for the presence of hallucinations and or delusions  - Assist with reality testing to support increasing orientation  - Assess if patient's hallucinations or delusions are encouraging self-harm or harm to others and intervene as appropriate  Outcome: Not Progressing

## 2024-03-01 NOTE — NURSING NOTE
Pt returned to unit from ECT#2. Pt denies any headache, nausea or dizziness upon return. Vitals WNL.

## 2024-03-02 LAB
ATRIAL RATE: 93 BPM
ATRIAL RATE: 93 BPM
P AXIS: 42 DEGREES
P AXIS: 46 DEGREES
PR INTERVAL: 148 MS
PR INTERVAL: 150 MS
QRS AXIS: 40 DEGREES
QRS AXIS: 41 DEGREES
QRSD INTERVAL: 90 MS
QRSD INTERVAL: 90 MS
QT INTERVAL: 364 MS
QT INTERVAL: 372 MS
QTC INTERVAL: 452 MS
QTC INTERVAL: 462 MS
T WAVE AXIS: 16 DEGREES
T WAVE AXIS: 18 DEGREES
VENTRICULAR RATE: 93 BPM
VENTRICULAR RATE: 93 BPM

## 2024-03-02 PROCEDURE — 93010 ELECTROCARDIOGRAM REPORT: CPT | Performed by: INTERNAL MEDICINE

## 2024-03-02 PROCEDURE — 99231 SBSQ HOSP IP/OBS SF/LOW 25: CPT | Performed by: PSYCHIATRY & NEUROLOGY

## 2024-03-02 PROCEDURE — 93005 ELECTROCARDIOGRAM TRACING: CPT

## 2024-03-02 RX ADMIN — ATROPINE SULFATE 1 DROP: 10 SOLUTION/ DROPS OPHTHALMIC at 21:19

## 2024-03-02 RX ADMIN — HYDROXYZINE HYDROCHLORIDE 50 MG: 50 TABLET, FILM COATED ORAL at 16:45

## 2024-03-02 RX ADMIN — PROPRANOLOL HYDROCHLORIDE 10 MG: 10 TABLET ORAL at 08:51

## 2024-03-02 RX ADMIN — NICOTINE POLACRILEX 4 MG: 4 GUM, CHEWING ORAL at 15:55

## 2024-03-02 RX ADMIN — CLOZAPINE 450 MG: 25 TABLET ORAL at 21:17

## 2024-03-02 RX ADMIN — MINOCYCLINE HYDROCHLORIDE 100 MG: 100 CAPSULE ORAL at 08:51

## 2024-03-02 RX ADMIN — METFORMIN HYDROCHLORIDE 500 MG: 500 TABLET ORAL at 08:50

## 2024-03-02 RX ADMIN — SENNOSIDES AND DOCUSATE SODIUM 2 TABLET: 8.6; 5 TABLET ORAL at 08:50

## 2024-03-02 RX ADMIN — NICOTINE POLACRILEX 4 MG: 4 GUM, CHEWING ORAL at 20:20

## 2024-03-02 RX ADMIN — ESCITALOPRAM OXALATE 20 MG: 10 TABLET ORAL at 08:51

## 2024-03-02 RX ADMIN — SENNOSIDES AND DOCUSATE SODIUM 2 TABLET: 8.6; 5 TABLET ORAL at 17:44

## 2024-03-02 RX ADMIN — POLYETHYLENE GLYCOL 3350 17 G: 17 POWDER, FOR SOLUTION ORAL at 08:50

## 2024-03-02 RX ADMIN — MELATONIN TAB 3 MG 3 MG: 3 TAB at 21:16

## 2024-03-02 RX ADMIN — NICOTINE POLACRILEX 4 MG: 4 GUM, CHEWING ORAL at 12:40

## 2024-03-02 RX ADMIN — MINOCYCLINE HYDROCHLORIDE 100 MG: 100 CAPSULE ORAL at 21:16

## 2024-03-02 RX ADMIN — PROPRANOLOL HYDROCHLORIDE 10 MG: 10 TABLET ORAL at 21:16

## 2024-03-02 RX ADMIN — MIRTAZAPINE 45 MG: 30 TABLET, FILM COATED ORAL at 21:16

## 2024-03-02 RX ADMIN — NICOTINE POLACRILEX 4 MG: 4 GUM, CHEWING ORAL at 17:44

## 2024-03-02 NOTE — PROGRESS NOTES
Psychiatric Progress Note - Department of Behavioral Health   Alberto Berumen 28 y.o. male MRN: [id number]  Unit/Bed#: [facility] 383-02 Encounter: [id number]    ASSESSMENT & PLAN     Diagnoses:   Principal Problem:    Schizoaffective disorder, bipolar type (HCC)  Active Problems:    GERD (gastroesophageal reflux disease)    Tobacco abuse    T wave inversion in EKG    Chronic idiopathic constipation    Vitamin B 12 deficiency    Vitamin D deficiency    Acanthosis nigricans    Rash      Treatment Recommendations/Precautions:  Continue to promote patient participation in therapeutic milieu.  Continue medical management per medicine.  Continue previously prescribed psychotropic medication regimen; see below.  Continue behavioral health checks q.7 minutes.   Continue vitals per behavioral health unit protocol.  Discharge date per primary team; 201 commitment status.    SUBJECTIVE     Patient evaluated this a.m. for continuity of care. Patient was discussed at length with nursing and treatment team. Per nursing, patient remains calm, cooperative, intermittently interactive in the milieu, adherent to his medications without any acute adverse effects, appearing to have tolerated ECT without incident. No acute distress is noted throughout evaluation. Alberto Berumen denies suicidal/homicidal ideation in addition to thoughts of self-injury, receptive to crisis planning provided by this writer, contacting for safety in the inpatient setting, admitting to an ability to appropriately confide in staff including this writer.  Patient appears scant, sparse, minimally interactive involving this writer, denying dysphoric mood including depression and anxiety, although admits to intermittent instances of auditory hallucinations that are unchanged in comparison to previous evaluation, denying any additional psychiatric complaints/concerns    PSYCHIATRIC REVIEW OF SYSTEMS     Behavior over the last 24 hours:  unchanged  Sleep:  "adequate  Appetite: adequate  Medication side effects: No    REVIEW OF SYSTEMS   Review of systems: no complaints    OBJECTIVE     Vital Signs in Past 24 Hours:  Temp:  [98.4 °F (36.9 °C)-98.8 °F (37.1 °C)] 98.4 °F (36.9 °C)  HR:  [] 101  Resp:  [16-18] 16  BP: (132-163)/(67-89) 163/83    Intake/Output in Past 24 hours:  I/O last 3 completed shifts:  In: 300 [I.V.:300]  Out: -   No intake/output data recorded.        Laboratory Results:  I have personally reviewed all pertinent laboratory/tests results.  Most Recent Labs:   Lab Results   Component Value Date    WBC 7.05 02/26/2024    RBC 5.41 02/26/2024    HGB 13.1 02/26/2024    HCT 43.3 02/26/2024     02/26/2024    RDW 12.9 02/26/2024    NEUTROABS 3.30 02/26/2024    SODIUM 136 01/11/2024    K 4.1 01/11/2024     01/11/2024    CO2 27 01/11/2024    BUN 12 01/11/2024    CREATININE 0.90 01/11/2024    GLUC 97 01/11/2024    GLUF 97 01/11/2024    CALCIUM 9.4 01/11/2024    AST 23 01/11/2024    ALT 50 01/11/2024    ALKPHOS 76 01/11/2024    TP 7.1 01/11/2024    ALB 4.0 01/11/2024    TBILI 0.43 01/11/2024    CHOLESTEROL 161 01/11/2024    HDL 49 01/11/2024    TRIG 132 01/11/2024    LDLCALC 86 01/11/2024    NONHDLC 112 01/11/2024    LITHIUM 0.61 01/09/2024    TRF1XUNMMYDI 1.062 11/15/2023    HGBA1C 5.8 (H) 01/23/2024     01/23/2024     Labs in last 72 hours: No results for input(s): \"WBC\", \"RBC\", \"HGB\", \"HCT\", \"PLT\", \"RDW\", \"TOTANEUTABS\", \"NEUTROABS\", \"SODIUM\", \"K\", \"CL\", \"CO2\", \"BUN\", \"CREATININE\", \"GLUC\", \"GLUF\", \"CALCIUM\", \"AST\", \"ALT\", \"ALKPHOS\", \"TP\", \"ALB\", \"TBILI\", \"CHOLESTEROL\", \"HDL\", \"TRIG\", \"LDLCALC\", \"VALPROICTOT\", \"CARBAMAZEPIN\", \"LITHIUM\", \"AMMONIA\", \"MMO0UXAIKKPF\", \"FREET4\", \"T3FREE\", \"PREGTESTUR\", \"PREGSERUM\", \"HCG\", \"HCGQUANT\", \"RPR\" in the last 72 hours.  Admission Labs:   No results displayed because visit has over 200 results.          Behavioral Health Medications: all current active meds have been reviewed, continue current " psychiatric medications, and current meds:   Current Facility-Administered Medications   Medication Dose Route Frequency    acetaminophen (TYLENOL) tablet 650 mg  650 mg Oral Q6H PRN    acetaminophen (TYLENOL) tablet 650 mg  650 mg Oral Q4H PRN    acetaminophen (TYLENOL) tablet 975 mg  975 mg Oral Q6H PRN    aluminum-magnesium hydroxide-simethicone (MAALOX) oral suspension 30 mL  30 mL Oral Q4H PRN    atropine (ISOPTO ATROPINE) 1 % ophthalmic solution 1 drop  1 drop Sublingual HS    atropine (ISOPTO ATROPINE) 1 % ophthalmic solution 1 drop  1 drop Sublingual Daily PRN    haloperidol lactate (HALDOL) injection 2.5 mg  2.5 mg Intramuscular Q4H PRN Max 4/day    And    LORazepam (ATIVAN) injection 1 mg  1 mg Intramuscular Q4H PRN Max 4/day    And    benztropine (COGENTIN) injection 0.5 mg  0.5 mg Intramuscular Q4H PRN Max 4/day    haloperidol lactate (HALDOL) injection 5 mg  5 mg Intramuscular Q4H PRN Max 4/day    And    LORazepam (ATIVAN) injection 2 mg  2 mg Intramuscular Q4H PRN Max 4/day    And    benztropine (COGENTIN) injection 1 mg  1 mg Intramuscular Q4H PRN Max 4/day    benztropine (COGENTIN) tablet 1 mg  1 mg Oral Q4H PRN Max 6/day    bisacodyl (DULCOLAX) rectal suppository 10 mg  10 mg Rectal Daily PRN    cloZAPine (CLOZARIL) tablet 450 mg  450 mg Oral HS    hydrOXYzine HCL (ATARAX) tablet 50 mg  50 mg Oral Q6H PRN Max 4/day    Or    diphenhydrAMINE (BENADRYL) injection 50 mg  50 mg Intramuscular Q6H PRN    diphenhydrAMINE-zinc acetate (BENADRYL) 2-0.1 % cream   Topical BID PRN    escitalopram (LEXAPRO) tablet 20 mg  20 mg Oral Daily    haloperidol (HALDOL) tablet 1 mg  1 mg Oral Q6H PRN    haloperidol (HALDOL) tablet 2.5 mg  2.5 mg Oral Q4H PRN Max 4/day    haloperidol (HALDOL) tablet 5 mg  5 mg Oral Q4H PRN Max 4/day    hydrocortisone 1 % ointment   Topical 4x Daily PRN    hydrOXYzine HCL (ATARAX) tablet 100 mg  100 mg Oral Q6H PRN Max 4/day    Or    LORazepam (ATIVAN) injection 2 mg  2 mg Intramuscular Q6H  "PRN    hydrOXYzine HCL (ATARAX) tablet 25 mg  25 mg Oral Q6H PRN Max 4/day    lactated ringers infusion  100 mL/hr Intravenous Continuous    melatonin tablet 3 mg  3 mg Oral HS    metFORMIN (GLUCOPHAGE) tablet 500 mg  500 mg Oral Daily With Breakfast    methocarbamol (ROBAXIN) tablet 500 mg  500 mg Oral Q6H PRN    minocycline (MINOCIN) capsule 100 mg  100 mg Oral Q12H KAYLIE    mirtazapine (REMERON) tablet 45 mg  45 mg Oral HS    nicotine polacrilex (NICORETTE) gum 4 mg  4 mg Oral Q2H PRN    polyethylene glycol (MIRALAX) packet 17 g  17 g Oral Daily PRN    polyethylene glycol (MIRALAX) packet 17 g  17 g Oral Daily    propranolol (INDERAL) tablet 10 mg  10 mg Oral Q12H KAYLIE    senna-docusate sodium (SENOKOT S) 8.6-50 mg per tablet 1 tablet  1 tablet Oral Daily PRN    senna-docusate sodium (SENOKOT S) 8.6-50 mg per tablet 2 tablet  2 tablet Oral BID    traZODone (DESYREL) tablet 50 mg  50 mg Oral HS PRN    white petrolatum-mineral oil (EUCERIN,HYDROCERIN) cream   Topical TID PRN   .    Risks, benefits and possible side effects of Medications:   Risks, benefits, and possible side effects of medications explained to patient and patient verbalizes understanding.      Mental Status Evaluation:  Appearance:  age appropriate, casually dressed, disheveled, and possessing marginal grooming/hygiene   Behavior:  psychomotor retardation calm, minimally interactive possessing poor eye contact, somewhat suspicious   Speech:  soft and scant   Mood:  euthymic; \"fine\"   Affect:  blunted and mood-incongruent   Language sparse   Thought Process:  goal directed   Thought Content:  no overt obsessions or delusions although appears slightly paranoid   Perceptual Disturbances: Auditory hallucinations without commands appearing internally preoccupied   Risk Potential: Suicidal Ideations none, Homicidal Ideations none, and Potential for Aggression No   Sensorium:  person, place, and time/date   Cognition:  recent and remote memory grossly intact "   Consciousness:  alert and awake    Attention: attention span appeared shorter than expected for age   Insight:  limited   Judgment: limited   Intellect Not assessed   Gait/Station: Not assessed; in bed   Motor Activity: no abnormal movements     Memory: Short and long term memory  fair     Progress Toward Goals: unchanged, as evidenced by their participation (or lack thereof) in individual, social and therapeutic milieu in addition to adherence to their medication regimen.    Recommended Treatment:   See above for assessment and plan.    Inpatient Psychiatric Certification: Based upon physical, mental and social evaluations, I certify that inpatient psychiatric services are medically necessary for this patient for a duration of greater than 2 midnights for the treatment of Schizoaffective disorder, bipolar type (HCC) including psychotropic medication management, participation in the therapeutic milieu and referrals as indicated. Available alternative community resources do not meet the patient's mental health care needs. I further attest that an established written individualized plan of care has been implemented and is outlined in the patient's medical records.    Counseling/Coordination of Care    Total unit time spent today was greater than 15 minutes. Greater than 50% of total time was spent with the patient and/or patient's relatives and/or coordination of patient's care.     Patient's rights, confidentiality, exceptions to confidentiality, use of electronic medical record including appropriate staff access to medical record regarding behavioral health services and consent to treatment were reviewed.    Note Share:     This note was not shared with the patient due to reasonable likelihood of causing patient harm     This note has been constructed using a voice recognition system.    There may be translation, syntax, or grammatical errors. If you have any questions, please contact the dictating  provider.    Jordan Christopher Holter, DO 03/02/24

## 2024-03-02 NOTE — NURSING NOTE
"Pt is calm and cooperative. Visible in the milieu, social with staff and peers. Reports ongoing AH. Denies SI, HI, VH. Pt c/o 4/10 generalized muscle pain from ECT, offered and given PRN tylenol 650mg for moderate pain at 2015. One hour later pt reported \"My pain is good now\", effective. Compliant with snack and all other medication. Denies unmet needs/ concerns at this time.   "

## 2024-03-02 NOTE — NURSING NOTE
Patient reporting chest pain early this evening, EKG obtained, no new orders from medical provider. Given 50mg of atarax for moderate anxiety at 1645, intervention effective at 1745 reassessment. No longer reporting chest pain.

## 2024-03-02 NOTE — NURSING NOTE
Alberto is calm with a blunted affect and scant in speech. Eye contact is fair. Reports continuing auditory hallucinations telling him they are going to kill him. He says he is simply ignoring them. He reports nausea this morning with vomiting which has since subsided. He did not eat breakfast but was able to eat lunch without GI distress. He reports frustration with duration of hospital stay and lack of improvement in hallucinations. No unmet needs at this time.

## 2024-03-03 PROCEDURE — 99231 SBSQ HOSP IP/OBS SF/LOW 25: CPT | Performed by: PSYCHIATRY & NEUROLOGY

## 2024-03-03 RX ADMIN — METFORMIN HYDROCHLORIDE 500 MG: 500 TABLET ORAL at 10:17

## 2024-03-03 RX ADMIN — SENNOSIDES AND DOCUSATE SODIUM 2 TABLET: 8.6; 5 TABLET ORAL at 10:08

## 2024-03-03 RX ADMIN — NICOTINE POLACRILEX 4 MG: 4 GUM, CHEWING ORAL at 20:02

## 2024-03-03 RX ADMIN — NICOTINE POLACRILEX 4 MG: 4 GUM, CHEWING ORAL at 17:28

## 2024-03-03 RX ADMIN — MINOCYCLINE HYDROCHLORIDE 100 MG: 100 CAPSULE ORAL at 10:08

## 2024-03-03 RX ADMIN — SENNOSIDES AND DOCUSATE SODIUM 2 TABLET: 8.6; 5 TABLET ORAL at 17:28

## 2024-03-03 RX ADMIN — NICOTINE POLACRILEX 4 MG: 4 GUM, CHEWING ORAL at 10:08

## 2024-03-03 RX ADMIN — MIRTAZAPINE 45 MG: 30 TABLET, FILM COATED ORAL at 21:29

## 2024-03-03 RX ADMIN — MINOCYCLINE HYDROCHLORIDE 100 MG: 100 CAPSULE ORAL at 20:02

## 2024-03-03 RX ADMIN — NICOTINE POLACRILEX 4 MG: 4 GUM, CHEWING ORAL at 12:42

## 2024-03-03 RX ADMIN — ATROPINE SULFATE 1 DROP: 10 SOLUTION/ DROPS OPHTHALMIC at 21:28

## 2024-03-03 RX ADMIN — POLYETHYLENE GLYCOL 3350 17 G: 17 POWDER, FOR SOLUTION ORAL at 10:08

## 2024-03-03 RX ADMIN — PROPRANOLOL HYDROCHLORIDE 10 MG: 10 TABLET ORAL at 10:13

## 2024-03-03 RX ADMIN — ESCITALOPRAM OXALATE 20 MG: 10 TABLET ORAL at 10:08

## 2024-03-03 RX ADMIN — NICOTINE POLACRILEX 4 MG: 4 GUM, CHEWING ORAL at 22:06

## 2024-03-03 RX ADMIN — MELATONIN TAB 3 MG 3 MG: 3 TAB at 21:29

## 2024-03-03 RX ADMIN — PROPRANOLOL HYDROCHLORIDE 10 MG: 10 TABLET ORAL at 21:29

## 2024-03-03 RX ADMIN — CLOZAPINE 450 MG: 25 TABLET ORAL at 21:29

## 2024-03-03 NOTE — NURSING NOTE
"Alberto is calm with a blunted affect reporting auditory hallucinations saying \"I'm gonna die.\" He denies depression, anxiety, SI/HI/AVH. He is visible on the milieu and socializing with peers at times. Otherwise, he is mostly withdrawn to his room. No unmet needs at this time.   "

## 2024-03-03 NOTE — PROGRESS NOTES
"    Psychiatric Progress Note - Department of Behavioral Health   Alberto Berumen 28 y.o. male MRN: [id number]  Unit/Bed#: [facility] 383-02 Encounter: [id number]    ASSESSMENT & PLAN     Diagnoses:   Principal Problem:    Schizoaffective disorder, bipolar type (Formerly [facility])  Active Problems:    GERD (gastroesophageal reflux disease)    Tobacco abuse    T wave inversion in EKG    Chronic idiopathic constipation    Vitamin B 12 deficiency    Vitamin D deficiency    Acanthosis nigricans    Rash      Treatment Recommendations/Precautions:  Continue to promote patient participation in therapeutic milieu.  Continue medical management per medicine.  Continue previously prescribed psychotropic medication regimen; see below.  Continue behavioral health checks q.7 minutes.   Continue vitals per behavioral health unit protocol.  Discharge date per primary team; 201 commitment status.    SUBJECTIVE     Patient evaluated this a.m. for continuity of care. Patient was discussed at length with nursing and treatment team. Per nursing, patient remains calm, cooperative, intermittently interactive in the milieu, adherent to his medications less any acute adverse effects No acute distress is noted throughout evaluation. Alberto Berumen denies suicidal/homicidal ideation in addition to thoughts of self-injury, receptive to crisis planning provided by this writer, contacting for safety in the inpatient setting, admitting to an ability to appropriately confide in staff including this writer.  Patient remains scant, sparse, minimally interactive involving this writer, denying any/all psychiatric complaints/concerns despite admitting to previous auditory hallucinations, appearing suspicious, paranoid, stating he is \"going to die.\"     PSYCHIATRIC REVIEW OF SYSTEMS     Behavior over the last 24 hours:  unchanged  Sleep: appropriate  Appetite: appropriate  Medication side effects: none    REVIEW OF SYSTEMS   Review of systems: no complaints    OBJECTIVE " "    Vital Signs in Past 24 Hours:  Temp:  [97.8 °F (36.6 °C)-98.6 °F (37 °C)] 97.8 °F (36.6 °C)  HR:  [] 91  Resp:  [16-18] 18  BP: (129-154)/(82-98) 129/84    Intake/Output in Past 24 hours:  No intake/output data recorded.  No intake/output data recorded.        Laboratory Results:  I have personally reviewed all pertinent laboratory/tests results.  Most Recent Labs:   Lab Results   Component Value Date    WBC 7.05 02/26/2024    RBC 5.41 02/26/2024    HGB 13.1 02/26/2024    HCT 43.3 02/26/2024     02/26/2024    RDW 12.9 02/26/2024    NEUTROABS 3.30 02/26/2024    SODIUM 136 01/11/2024    K 4.1 01/11/2024     01/11/2024    CO2 27 01/11/2024    BUN 12 01/11/2024    CREATININE 0.90 01/11/2024    GLUC 97 01/11/2024    GLUF 97 01/11/2024    CALCIUM 9.4 01/11/2024    AST 23 01/11/2024    ALT 50 01/11/2024    ALKPHOS 76 01/11/2024    TP 7.1 01/11/2024    ALB 4.0 01/11/2024    TBILI 0.43 01/11/2024    CHOLESTEROL 161 01/11/2024    HDL 49 01/11/2024    TRIG 132 01/11/2024    LDLCALC 86 01/11/2024    NONHDLC 112 01/11/2024    LITHIUM 0.61 01/09/2024    QYT1RFLTTQXJ 1.062 11/15/2023    HGBA1C 5.8 (H) 01/23/2024     01/23/2024     Labs in last 72 hours: No results for input(s): \"WBC\", \"RBC\", \"HGB\", \"HCT\", \"PLT\", \"RDW\", \"TOTANEUTABS\", \"NEUTROABS\", \"SODIUM\", \"K\", \"CL\", \"CO2\", \"BUN\", \"CREATININE\", \"GLUC\", \"GLUF\", \"CALCIUM\", \"AST\", \"ALT\", \"ALKPHOS\", \"TP\", \"ALB\", \"TBILI\", \"CHOLESTEROL\", \"HDL\", \"TRIG\", \"LDLCALC\", \"VALPROICTOT\", \"CARBAMAZEPIN\", \"LITHIUM\", \"AMMONIA\", \"[id number]\", \"FREET4\", \"T3FREE\", \"PREGTESTUR\", \"PREGSERUM\", \"HCG\", \"HCGQUANT\", \"RPR\" in the last 72 hours.  Admission Labs:   No results displayed because visit has over 200 results.          Behavioral Health Medications: all current active meds have been reviewed, continue current psychiatric medications, and current meds:   Current Facility-Administered Medications   Medication Dose Route Frequency    acetaminophen (TYLENOL) tablet 650 mg  " 650 mg Oral Q6H PRN    acetaminophen (TYLENOL) tablet 650 mg  650 mg Oral Q4H PRN    acetaminophen (TYLENOL) tablet 975 mg  975 mg Oral Q6H PRN    aluminum-magnesium hydroxide-simethicone (MAALOX) oral suspension 30 mL  30 mL Oral Q4H PRN    atropine (ISOPTO ATROPINE) 1 % ophthalmic solution 1 drop  1 drop Sublingual HS    atropine (ISOPTO ATROPINE) 1 % ophthalmic solution 1 drop  1 drop Sublingual Daily PRN    haloperidol lactate (HALDOL) injection 2.5 mg  2.5 mg Intramuscular Q4H PRN Max 4/day    And    LORazepam (ATIVAN) injection 1 mg  1 mg Intramuscular Q4H PRN Max 4/day    And    benztropine (COGENTIN) injection 0.5 mg  0.5 mg Intramuscular Q4H PRN Max 4/day    haloperidol lactate (HALDOL) injection 5 mg  5 mg Intramuscular Q4H PRN Max 4/day    And    LORazepam (ATIVAN) injection 2 mg  2 mg Intramuscular Q4H PRN Max 4/day    And    benztropine (COGENTIN) injection 1 mg  1 mg Intramuscular Q4H PRN Max 4/day    benztropine (COGENTIN) tablet 1 mg  1 mg Oral Q4H PRN Max 6/day    bisacodyl (DULCOLAX) rectal suppository 10 mg  10 mg Rectal Daily PRN    cloZAPine (CLOZARIL) tablet 450 mg  450 mg Oral HS    hydrOXYzine HCL (ATARAX) tablet 50 mg  50 mg Oral Q6H PRN Max 4/day    Or    diphenhydrAMINE (BENADRYL) injection 50 mg  50 mg Intramuscular Q6H PRN    diphenhydrAMINE-zinc acetate (BENADRYL) 2-0.1 % cream   Topical BID PRN    escitalopram (LEXAPRO) tablet 20 mg  20 mg Oral Daily    haloperidol (HALDOL) tablet 1 mg  1 mg Oral Q6H PRN    haloperidol (HALDOL) tablet 2.5 mg  2.5 mg Oral Q4H PRN Max 4/day    haloperidol (HALDOL) tablet 5 mg  5 mg Oral Q4H PRN Max 4/day    hydrocortisone 1 % ointment   Topical 4x Daily PRN    hydrOXYzine HCL (ATARAX) tablet 100 mg  100 mg Oral Q6H PRN Max 4/day    Or    LORazepam (ATIVAN) injection 2 mg  2 mg Intramuscular Q6H PRN    hydrOXYzine HCL (ATARAX) tablet 25 mg  25 mg Oral Q6H PRN Max 4/day    lactated ringers infusion  100 mL/hr Intravenous Continuous    melatonin tablet 3 mg  " 3 mg Oral HS    metFORMIN (GLUCOPHAGE) tablet 500 mg  500 mg Oral Daily With Breakfast    methocarbamol (ROBAXIN) tablet 500 mg  500 mg Oral Q6H PRN    minocycline (MINOCIN) capsule 100 mg  100 mg Oral Q12H KAYLIE    mirtazapine (REMERON) tablet 45 mg  45 mg Oral HS    nicotine polacrilex (NICORETTE) gum 4 mg  4 mg Oral Q2H PRN    polyethylene glycol (MIRALAX) packet 17 g  17 g Oral Daily PRN    polyethylene glycol (MIRALAX) packet 17 g  17 g Oral Daily    propranolol (INDERAL) tablet 10 mg  10 mg Oral Q12H KAYLIE    senna-docusate sodium (SENOKOT S) 8.6-50 mg per tablet 1 tablet  1 tablet Oral Daily PRN    senna-docusate sodium (SENOKOT S) 8.6-50 mg per tablet 2 tablet  2 tablet Oral BID    traZODone (DESYREL) tablet 50 mg  50 mg Oral HS PRN    white petrolatum-mineral oil (EUCERIN,HYDROCERIN) cream   Topical TID PRN   .    Risks, benefits and possible side effects of Medications:   Risks, benefits, and possible side effects of medications explained to patient and patient verbalizes understanding.      Mental Status Evaluation:  Appearance:  age appropriate, casually dressed, disheveled, and possessing marginal grooming/hygiene   Behavior:  psychomotor retardation, calm, superficial, suspicious possessing poor eye contact   Speech:  soft and scant   Mood:  euthymic; \"OK\"   Affect:  blunted and mood-incongruent   Language sparse   Thought Process:  goal directed   Thought Content:  no overt obsessions or delusions although appears paranoid   Perceptual Disturbances: Auditory hallucinations without commands appearing internally preoccupied   Risk Potential: Suicidal Ideations none, Homicidal Ideations none, and Potential for Aggression No   Sensorium:  person, place, and time/date   Cognition:  recent and remote memory grossly intact   Consciousness:  alert and awake    Attention: Appears less than expected   Insight:  limited   Judgment: limited   Intellect Not assessed   Gait/Station: Not assessed; in bed   Motor " Activity: no abnormal movements     Memory: Short and long term memory  fair     Progress Toward Goals: unchanged, as evidenced by their participation (or lack thereof) in individual, social and therapeutic milieu in addition to adherence to their medication regimen.    Recommended Treatment:   See above for assessment and plan.    Inpatient Psychiatric Certification: Based upon physical, mental and social evaluations, I certify that inpatient psychiatric services are medically necessary for this patient for a duration of greater than 2 midnights for the treatment of Schizoaffective disorder, bipolar type (HCC) including psychotropic medication management, participation in the therapeutic milieu and referrals as indicated. Available alternative community resources do not meet the patient's mental health care needs. I further attest that an established written individualized plan of care has been implemented and is outlined in the patient's medical records.    Counseling/Coordination of Care    Total unit time spent today was greater than 15 minutes. Greater than 50% of total time was spent with the patient and/or patient's relatives and/or coordination of patient's care.     Patient's rights, confidentiality, exceptions to confidentiality, use of electronic medical record including appropriate staff access to medical record regarding behavioral health services and consent to treatment were reviewed.    Note Share:     This note was not shared with the patient due to reasonable likelihood of causing patient harm     This note has been constructed using a voice recognition system.    There may be translation, syntax, or grammatical errors. If you have any questions, please contact the dictating provider.    Jordan Christopher Holter, DO 03/03/24

## 2024-03-04 ENCOUNTER — APPOINTMENT (INPATIENT)
Dept: PREOP/PACU | Facility: HOSPITAL | Age: 29
DRG: 750 | End: 2024-03-04
Attending: STUDENT IN AN ORGANIZED HEALTH CARE EDUCATION/TRAINING PROGRAM
Payer: COMMERCIAL

## 2024-03-04 ENCOUNTER — ANESTHESIA (INPATIENT)
Dept: PREOP/PACU | Facility: HOSPITAL | Age: 29
End: 2024-03-04
Payer: COMMERCIAL

## 2024-03-04 ENCOUNTER — ANESTHESIA EVENT (INPATIENT)
Dept: PREOP/PACU | Facility: HOSPITAL | Age: 29
End: 2024-03-04
Payer: COMMERCIAL

## 2024-03-04 LAB
ATRIAL RATE: 107 BPM
BASOPHILS # BLD AUTO: 0.05 THOUSANDS/ÂΜL (ref 0–0.1)
BASOPHILS NFR BLD AUTO: 1 % (ref 0–1)
BNP SERPL-MCNC: 4 PG/ML (ref 0–100)
CARDIAC TROPONIN I PNL SERPL HS: 3 NG/L (ref 8–18)
CK SERPL-CCNC: 641 U/L (ref 39–308)
CRP SERPL QL: 9 MG/L
EOSINOPHIL # BLD AUTO: 0.31 THOUSAND/ÂΜL (ref 0–0.61)
EOSINOPHIL NFR BLD AUTO: 4 % (ref 0–6)
ERYTHROCYTE [DISTWIDTH] IN BLOOD BY AUTOMATED COUNT: 13.1 % (ref 11.6–15.1)
HCT VFR BLD AUTO: 40.1 % (ref 36.5–49.3)
HGB BLD-MCNC: 11.9 G/DL (ref 12–17)
IMM GRANULOCYTES # BLD AUTO: 0.02 THOUSAND/UL (ref 0–0.2)
IMM GRANULOCYTES NFR BLD AUTO: 0 % (ref 0–2)
LYMPHOCYTES # BLD AUTO: 2.84 THOUSANDS/ÂΜL (ref 0.6–4.47)
LYMPHOCYTES NFR BLD AUTO: 33 % (ref 14–44)
MCH RBC QN AUTO: 23.6 PG (ref 26.8–34.3)
MCHC RBC AUTO-ENTMCNC: 29.7 G/DL (ref 31.4–37.4)
MCV RBC AUTO: 79 FL (ref 82–98)
MONOCYTES # BLD AUTO: 0.75 THOUSAND/ÂΜL (ref 0.17–1.22)
MONOCYTES NFR BLD AUTO: 9 % (ref 4–12)
NEUTROPHILS # BLD AUTO: 4.62 THOUSANDS/ÂΜL (ref 1.85–7.62)
NEUTS SEG NFR BLD AUTO: 53 % (ref 43–75)
NRBC BLD AUTO-RTO: 0 /100 WBCS
P AXIS: 41 DEGREES
PLATELET # BLD AUTO: 221 THOUSANDS/UL (ref 149–390)
PMV BLD AUTO: 10.9 FL (ref 8.9–12.7)
PR INTERVAL: 140 MS
QRS AXIS: 53 DEGREES
QRSD INTERVAL: 86 MS
QT INTERVAL: 348 MS
QTC INTERVAL: 464 MS
RBC # BLD AUTO: 5.05 MILLION/UL (ref 3.88–5.62)
T WAVE AXIS: 44 DEGREES
VENTRICULAR RATE: 107 BPM
WBC # BLD AUTO: 8.59 THOUSAND/UL (ref 4.31–10.16)

## 2024-03-04 PROCEDURE — 84484 ASSAY OF TROPONIN QUANT: CPT | Performed by: NURSE PRACTITIONER

## 2024-03-04 PROCEDURE — 82550 ASSAY OF CK (CPK): CPT | Performed by: NURSE PRACTITIONER

## 2024-03-04 PROCEDURE — 93010 ELECTROCARDIOGRAM REPORT: CPT | Performed by: INTERNAL MEDICINE

## 2024-03-04 PROCEDURE — 93005 ELECTROCARDIOGRAM TRACING: CPT

## 2024-03-04 PROCEDURE — 85025 COMPLETE CBC W/AUTO DIFF WBC: CPT | Performed by: NURSE PRACTITIONER

## 2024-03-04 PROCEDURE — 90870 ELECTROCONVULSIVE THERAPY: CPT | Performed by: PSYCHIATRY & NEUROLOGY

## 2024-03-04 PROCEDURE — 90870 ELECTROCONVULSIVE THERAPY: CPT

## 2024-03-04 PROCEDURE — 86140 C-REACTIVE PROTEIN: CPT | Performed by: NURSE PRACTITIONER

## 2024-03-04 PROCEDURE — GZB2ZZZ ELECTROCONVULSIVE THERAPY, BILATERAL-SINGLE SEIZURE: ICD-10-PCS | Performed by: PSYCHIATRY & NEUROLOGY

## 2024-03-04 PROCEDURE — 83880 ASSAY OF NATRIURETIC PEPTIDE: CPT | Performed by: NURSE PRACTITIONER

## 2024-03-04 RX ORDER — SUCCINYLCHOLINE/SOD CL,ISO/PF 100 MG/5ML
SYRINGE (ML) INTRAVENOUS AS NEEDED
Status: DISCONTINUED | OUTPATIENT
Start: 2024-03-04 | End: 2024-03-04

## 2024-03-04 RX ORDER — HYDRALAZINE HYDROCHLORIDE 20 MG/ML
INJECTION INTRAMUSCULAR; INTRAVENOUS AS NEEDED
Status: DISCONTINUED | OUTPATIENT
Start: 2024-03-04 | End: 2024-03-04

## 2024-03-04 RX ORDER — GLYCOPYRROLATE 0.2 MG/ML
INJECTION INTRAMUSCULAR; INTRAVENOUS AS NEEDED
Status: DISCONTINUED | OUTPATIENT
Start: 2024-03-04 | End: 2024-03-04

## 2024-03-04 RX ORDER — METHOHEXITAL IN WATER/PF 100MG/10ML
SYRINGE (ML) INTRAVENOUS AS NEEDED
Status: DISCONTINUED | OUTPATIENT
Start: 2024-03-04 | End: 2024-03-04

## 2024-03-04 RX ORDER — LABETALOL HYDROCHLORIDE 5 MG/ML
INJECTION, SOLUTION INTRAVENOUS AS NEEDED
Status: DISCONTINUED | OUTPATIENT
Start: 2024-03-04 | End: 2024-03-04

## 2024-03-04 RX ORDER — ESMOLOL HYDROCHLORIDE 10 MG/ML
INJECTION INTRAVENOUS AS NEEDED
Status: DISCONTINUED | OUTPATIENT
Start: 2024-03-04 | End: 2024-03-04

## 2024-03-04 RX ADMIN — PROPRANOLOL HYDROCHLORIDE 10 MG: 10 TABLET ORAL at 21:09

## 2024-03-04 RX ADMIN — SENNOSIDES AND DOCUSATE SODIUM 2 TABLET: 8.6; 5 TABLET ORAL at 17:04

## 2024-03-04 RX ADMIN — MINOCYCLINE HYDROCHLORIDE 100 MG: 100 CAPSULE ORAL at 21:10

## 2024-03-04 RX ADMIN — GLYCOPYRROLATE 0.2 MG: 0.4 INJECTION INTRAMUSCULAR; INTRAVENOUS at 06:29

## 2024-03-04 RX ADMIN — POLYETHYLENE GLYCOL 3350 17 G: 17 POWDER, FOR SOLUTION ORAL at 08:17

## 2024-03-04 RX ADMIN — NICOTINE POLACRILEX 4 MG: 4 GUM, CHEWING ORAL at 17:59

## 2024-03-04 RX ADMIN — HYDRALAZINE HYDROCHLORIDE 10 MG: 20 INJECTION INTRAMUSCULAR; INTRAVENOUS at 06:31

## 2024-03-04 RX ADMIN — METFORMIN HYDROCHLORIDE 500 MG: 500 TABLET ORAL at 08:17

## 2024-03-04 RX ADMIN — NICOTINE POLACRILEX 4 MG: 4 GUM, CHEWING ORAL at 13:14

## 2024-03-04 RX ADMIN — SENNOSIDES AND DOCUSATE SODIUM 2 TABLET: 8.6; 5 TABLET ORAL at 08:17

## 2024-03-04 RX ADMIN — MIRTAZAPINE 45 MG: 30 TABLET, FILM COATED ORAL at 21:09

## 2024-03-04 RX ADMIN — CLOZAPINE 450 MG: 25 TABLET ORAL at 21:09

## 2024-03-04 RX ADMIN — SODIUM CHLORIDE, SODIUM LACTATE, POTASSIUM CHLORIDE, AND CALCIUM CHLORIDE: .6; .31; .03; .02 INJECTION, SOLUTION INTRAVENOUS at 06:21

## 2024-03-04 RX ADMIN — ACETAMINOPHEN 650 MG: 325 TABLET ORAL at 18:57

## 2024-03-04 RX ADMIN — LABETALOL HYDROCHLORIDE 10 MG: 5 INJECTION, SOLUTION INTRAVENOUS at 06:31

## 2024-03-04 RX ADMIN — NICOTINE POLACRILEX 4 MG: 4 GUM, CHEWING ORAL at 21:09

## 2024-03-04 RX ADMIN — ATROPINE SULFATE 1 DROP: 10 SOLUTION/ DROPS OPHTHALMIC at 21:09

## 2024-03-04 RX ADMIN — Medication 140 MG: at 06:31

## 2024-03-04 RX ADMIN — ESCITALOPRAM OXALATE 20 MG: 10 TABLET ORAL at 08:17

## 2024-03-04 RX ADMIN — Medication 50 MG: at 06:31

## 2024-03-04 RX ADMIN — ACETAMINOPHEN 650 MG: 325 TABLET ORAL at 13:14

## 2024-03-04 RX ADMIN — LABETALOL HYDROCHLORIDE 10 MG: 5 INJECTION, SOLUTION INTRAVENOUS at 06:44

## 2024-03-04 RX ADMIN — MELATONIN TAB 3 MG 3 MG: 3 TAB at 21:09

## 2024-03-04 RX ADMIN — MINOCYCLINE HYDROCHLORIDE 100 MG: 100 CAPSULE ORAL at 08:17

## 2024-03-04 RX ADMIN — METHOHEXITAL SODIUM 140 MG: 500 INJECTION, POWDER, LYOPHILIZED, FOR SOLUTION INTRAMUSCULAR; INTRAVENOUS; RECTAL at 06:31

## 2024-03-04 RX ADMIN — PROPRANOLOL HYDROCHLORIDE 10 MG: 10 TABLET ORAL at 08:17

## 2024-03-04 NOTE — NURSING NOTE
Pt is isolative this morning, but out for needs. Pt is medication and meal compliant. Pt denies SI, HI, and VH. Pt is still reporting AH. Denies any needs at this time.

## 2024-03-04 NOTE — PROGRESS NOTES
03/04/24 1532   Team Meeting   Meeting Type Daily Rounds   Team Members Present   Team Members Present Physician;Nurse;;Other (Discipline and Name)   Physician Team Member Mark   Nursing Team Member Earl   Care Management Team Member Natalya   Other (Discipline and Name) Serg (PharmD), Marco (therapist)   Patient/Family Present   Patient Present No   Patient's Family Present No   OTHER   Team Meeting - Additional Comments 201. Continues with auditory hallucinations.  Withdrawn to self. ECT today.  D/C TBD.

## 2024-03-04 NOTE — ANESTHESIA POSTPROCEDURE EVALUATION
Post-Op Assessment Note    CV Status:  Stable  Pain Score: 0    Pain management: adequate       Mental Status:  Alert   Hydration Status:  Stable   PONV Controlled:  Controlled   Airway Patency:  Patent     Post Op Vitals Reviewed: Yes    No anethesia notable event occurred.    Staff: CRNA               BP   171/77   Temp      Pulse  111   Resp   20   SpO2   95

## 2024-03-04 NOTE — PLAN OF CARE
Problem: PSYCHOSIS  Goal: Will report no hallucinations or delusions  Description: Interventions:  - Administer medication as  ordered  - Every waking shifts and PRN assess for the presence of hallucinations and or delusions  - Assist with reality testing to support increasing orientation  - Assess if patient's hallucinations or delusions are encouraging self-harm or harm to others and intervene as appropriate  Outcome: Not Progressing     Problem: ANXIETY  Goal: Will report anxiety at manageable levels  Description: INTERVENTIONS:  - Administer medication as ordered  - Teach and encourage coping skills  - Provide emotional support  - Assess patient/family for anxiety and ability to cope  Outcome: Progressing     Problem: Depression  Goal: Treatment Goal: Demonstrate behavioral control of depressive symptoms, verbalize feelings of improved mood/affect, and adopt new coping skills prior to discharge  Outcome: Progressing  Goal: Verbalize thoughts and feelings  Description: Interventions:  - Assess and re-assess patient's level of risk   - Engage patient in 1:1 interactions, daily, for a minimum of 15 minutes   - Encourage patient to express feelings, fears, frustrations, hopes   Outcome: Progressing  Goal: Refrain from isolation  Description: Interventions:  - Develop a trusting relationship   - Encourage socialization   Outcome: Progressing  Goal: Refrain from self-neglect  Outcome: Progressing     Problem: Electroconvulsive therapy (ECT)  Goal: Treatment Goal: Demonstrate a reduction of confusion and improved orientation to person, place, time and/or situation upon discharge, according to optimum baseline/ability  Outcome: Progressing  Goal: Verbalizes/displays adequate comfort level or baseline comfort level  Description: Interventions:  - Encourage patient to monitor pain and request assistance  - Assess pain using appropriate pain scale  - Administer analgesics based on type and severity of pain and evaluate  response  - Implement non-pharmacological measures as appropriate and evaluate response  - Consider cultural and social influences on pain and pain management  - Notify physician/advanced practitioner if interventions unsuccessful or patient reports new pain  Outcome: Progressing  Goal: Achieves stable or improved neurological status  Description: INTERVENTIONS  - Monitor and report changes in neurological status  - Monitor vital signs such as temperature, blood pressure, glucose, and any other labs ordered   - Initiate measures to prevent increased intracranial pressure  - Monitor for seizure activity and implement precautions if appropriate      Outcome: Progressing  Goal: Achieves maximal functionality and self care  Description: INTERVENTIONS  - Monitor swallowing and airway patency with patient fatigue and changes in neurological status  - Encourage and assist patient to increase activity and self care.   - Encourage visually impaired, hearing impaired and aphasic patients to use assistive/communication devices  Outcome: Progressing  Goal: Maintain or return mobility to safest level of function  Description: INTERVENTIONS:  - Assess patient's ability to carry out ADLs; assess patient's baseline for ADL function and identify physical deficits which impact ability to perform ADLs (bathing, care of mouth/teeth, toileting, grooming, dressing, etc.)  - Assess/evaluate cause of self-care deficits   - Assess range of motion  - Assess patient's mobility  - Assess patient's need for assistive devices and provide as appropriate  - Encourage maximum independence but intervene and supervise when necessary  - Involve family in performance of ADLs  - Assess for home care needs following discharge   - Consider OT consult to assist with ADL evaluation and planning for discharge  - Provide patient education as appropriate  Outcome: Progressing  Goal: Absence of urinary retention  Description: INTERVENTIONS:  - Assess patient’s  ability to void and empty bladder  - Monitor I/O  - Bladder scan as needed  - Discuss with physician/AP medications to alleviate retention as needed  - Discuss catheterization for long term situations as appropriate  Outcome: Progressing  Goal: Minimal or absence of nausea and/or vomiting  Description: INTERVENTIONS:  - Administer IV fluids if ordered to ensure adequate hydration  - Maintain NPO status until nausea and vomiting are resolved  - Nasogastric tube if ordered  - Administer ordered antiemetic medications as needed  - Provide nonpharmacologic comfort measures as appropriate  - Advance diet as tolerated, if ordered  - Consider nutrition services referral to assist patient with adequate nutrition and appropriate food choices  Outcome: Progressing  Goal: Maintains adequate nutritional intake  Description: INTERVENTIONS:  - Monitor percentage of each meal consumed  - Identify factors contributing to decreased intake, treat as appropriate  - Assist with meals as needed  - Monitor I&O, weight, and lab values if indicated  - Obtain nutrition services referral as needed  Outcome: Progressing

## 2024-03-04 NOTE — NURSING NOTE
"Pt returned from ECT, pt is denying headaches, nausea and vomiting. Pt is in generalized pain currently; describes it as being \"sore\", declines PRN for pain. Initial vitals WDL. Denies any needs at this time.   "

## 2024-03-04 NOTE — ANESTHESIA PREPROCEDURE EVALUATION
Procedure:  ELECTROCONVULSIVE THERAPY (ECT)    Relevant Problems   GI/HEPATIC   (+) GERD (gastroesophageal reflux disease)      Digestive   (+) Chronic idiopathic constipation      Musculoskeletal and Integument   (+) Acanthosis nigricans   (+) Rash   (+) Syringoma      Other   (+) Medical clearance for psychiatric admission   (+) Schizoaffective disorder, bipolar type (HCC)   (+) T wave inversion in EKG   (+) Tobacco abuse   (+) Vitamin B 12 deficiency   (+) Vitamin D deficiency        Physical Exam    Airway    Mallampati score: II  TM Distance: <3 FB  Neck ROM: full     Dental       Cardiovascular  Cardiovascular exam normal    Pulmonary  Pulmonary exam normal     Other Findings        Anesthesia Plan  ASA Score- 3     Anesthesia Type- general with ASA Monitors.         Additional Monitors:     Airway Plan:            Plan Factors-    Chart reviewed. EKG reviewed.  Existing labs reviewed.                   Induction- intravenous.    Postoperative Plan-     Informed Consent- Anesthetic plan and risks discussed with patient.  I personally reviewed this patient with the CRNA. Discussed and agreed on the Anesthesia Plan with the CRNA..

## 2024-03-04 NOTE — PROGRESS NOTES
"Progress Note - Behavioral Health     Alberto Berumen 28 y.o. male MRN: [id number]   Unit/Bed#: [facility] 383-02 Encounter: [id number]    Documentation, nursing notes, medication reconciliation, labs, and vitals reviewed. Patient was seen for continuing care and reviewed with treatment team. No acute events over the past 24 hours.  Per nursing report, complains of body aches post ECT, isolative to room in bed, ongoing AH. Had episode of nausea, vomiting and chest pain over weekend.    No medication changes over the past 24 hours. Continues to tolerate current medications with no adverse effects. Cardian labs this AM  with some increase in CK, and will re check on Wednesday. Continues ECT.     On evaluation today, he is seen in his room and resting in bed.  No complaints of nausea or chest pain today,  but body aches post ECT.  Continues with AH, voices tell him they will harm him and his family and they \"will all go to hell\" .  No suicidal ideation, plan, or intent. Ongoing perceptual disturbances and does not exhibit any symptoms of aimee on evaluation.    Psychiatric ROS:  Behavior over the last 24 hours: unchanged  Sleep: hypersomnia  Appetite: fair  Medication side effects: No   ROS: no complaints, all other systems are negative    Mental Status Evaluation:    Appearance:  age appropriate   Behavior:  guarded   Speech:  scant   Mood:  depressed   Affect:  blunted   Thought Process:  decreased rate of thoughts   Associations: concrete associations   Thought Content:  some paranoia   Perceptual Disturbances: auditory hallucinations   Risk Potential: Suicidal ideation - None  Homicidal ideation - None  Potential for aggression - No   Sensorium:  oriented to person, place, and time/date   Memory:  recent and remote memory grossly intact   Consciousness:  alert and awake   Attention/Concentration: decreased concentration and decreased attention span   Insight:  limited   Judgment: limited   Gait/Station: normal " gait/station, normal balance   Motor Activity: no abnormal movements     Vital signs in last 24 hours:    Temp:  [97.5 °F (36.4 °C)-98.5 °F (36.9 °C)] 97.9 °F (36.6 °C)  HR:  [] 78  Resp:  [16-20] 18  BP: ()/(46-97) 121/58    Laboratory results: I have personally reviewed all pertinent laboratory/tests results    Results from the past 24 hours:   Recent Results (from the past 24 hour(s))   High Sensitivity Troponin I Random    Collection Time: 03/04/24  5:17 AM   Result Value Ref Range    HS TnI random 3 (L) 8 - 18 ng/L   B-Type Natriuretic Peptide(BNP)    Collection Time: 03/04/24  5:17 AM   Result Value Ref Range    BNP 4 0 - 100 pg/mL   C-reactive protein    Collection Time: 03/04/24  5:17 AM   Result Value Ref Range    CRP 9.0 (H) <3.0 mg/L   CBC and differential    Collection Time: 03/04/24  5:17 AM   Result Value Ref Range    WBC 8.59 4.31 - 10.16 Thousand/uL    RBC 5.05 3.88 - 5.62 Million/uL    Hemoglobin 11.9 (L) 12.0 - 17.0 g/dL    Hematocrit 40.1 36.5 - 49.3 %    MCV 79 (L) 82 - 98 fL    MCH 23.6 (L) 26.8 - 34.3 pg    MCHC 29.7 (L) 31.4 - 37.4 g/dL    RDW 13.1 11.6 - 15.1 %    MPV 10.9 8.9 - 12.7 fL    Platelets 221 149 - 390 Thousands/uL    nRBC 0 /100 WBCs    Neutrophils Relative 53 43 - 75 %    Immat GRANS % 0 0 - 2 %    Lymphocytes Relative 33 14 - 44 %    Monocytes Relative 9 4 - 12 %    Eosinophils Relative 4 0 - 6 %    Basophils Relative 1 0 - 1 %    Neutrophils Absolute 4.62 1.85 - 7.62 Thousands/µL    Immature Grans Absolute 0.02 0.00 - 0.20 Thousand/uL    Lymphocytes Absolute 2.84 0.60 - 4.47 Thousands/µL    Monocytes Absolute 0.75 0.17 - 1.22 Thousand/µL    Eosinophils Absolute 0.31 0.00 - 0.61 Thousand/µL    Basophils Absolute 0.05 0.00 - 0.10 Thousands/µL   CK    Collection Time: 03/04/24  5:17 AM   Result Value Ref Range    Total  (H) 39 - 308 U/L       Suicide/Homicide Risk Assessment:    Risk of Harm to Self:   Current Specific Risk Factors include: mental illness  diagnosis, current psychotic symptoms  Protective Factors: no current suicidal ideation, ability to communicate with staff on the unit, able to contract for safety on the unit, taking medications as ordered on the unit  Based on today's assessment, Alberto presents the following risk of harm to self: low    Risk of Harm to Others:  Current Specific Risk Factors include: diagnosis of mood disorder, current psychotic symptoms  Protective Factors: no current homicidal ideation, able to communicate with staff on the unit, impulse control is improving, psychotic symptoms are less prominent  Based on today's assessment, Alberto presents the following risk of harm to others: minimal    The following interventions are recommended: behavioral checks every 7 minutes, continued hospitalization on locked unit    Progress Toward Goals: minimal improvement, Extended Acute Care referral  Extended Acute Care referral  Continue ECT #4 on 3/6/2024  Monitor CBC/diff, CPK, C-reactive protein, BNP, troponin, and ECG weekly. Will also recheck cardiac parameters on 3/6/24 due to episode of chest pain over the weekend  Continue current medications.  Assessment/Plan   Principal Problem:    Schizoaffective disorder, bipolar type (HCC)  Active Problems:    GERD (gastroesophageal reflux disease)    Tobacco abuse    T wave inversion in EKG    Chronic idiopathic constipation    Vitamin B 12 deficiency    Vitamin D deficiency    Acanthosis nigricans    Rash      Recommended Treatment:     Planned medication and treatment changes:    All current active medications have been reviewed  Encourage group therapy, milieu therapy and occupational therapy  Behavioral Health checks every 7 minutes  Check CBC/diff, CPK, C-reactive protein, BNP, and troponin in 2 days  Check ECG today    Current Facility-Administered Medications   Medication Dose Route Frequency Provider Last Rate    acetaminophen  650 mg Oral Q6H PRN Yasmin Harvey MD      acetaminophen   650 mg Oral Q4H PRN Yasmin Harvey MD      acetaminophen  975 mg Oral Q6H PRN Yasmin Harvey MD      aluminum-magnesium hydroxide-simethicone  30 mL Oral Q4H PRN Yasmin Harvey MD      atropine  1 drop Sublingual HS Prisca Villafuerte, CRNP      atropine  1 drop Sublingual Daily PRN Prisca Christo, CRNP      haloperidol lactate  2.5 mg Intramuscular Q4H PRN Max 4/day Yasmin Harvey MD      And    LORazepam  1 mg Intramuscular Q4H PRN Max 4/day Yasmin Harvey MD      And    benztropine  0.5 mg Intramuscular Q4H PRN Max 4/day Yasmin Harvey MD      haloperidol lactate  5 mg Intramuscular Q4H PRN Max 4/day Yasmin Harvey MD      And    LORazepam  2 mg Intramuscular Q4H PRN Max 4/day Yasmin Harvey MD      And    benztropine  1 mg Intramuscular Q4H PRN Max 4/day Yasmin Harvey MD      benztropine  1 mg Oral Q4H PRN Max 6/day Yasmin Harvey MD      bisacodyl  10 mg Rectal Daily PRN Yasmin Harvey MD      cloZAPine  450 mg Oral HS Prisca Christo, CRNP      hydrOXYzine HCL  50 mg Oral Q6H PRN Max 4/day Yasmin Harvey MD      Or    diphenhydrAMINE  50 mg Intramuscular Q6H PRN Yasmin Harvey MD      diphenhydrAMINE-zinc acetate   Topical BID PRN Anjel Sagastume MD      escitalopram  20 mg Oral Daily La Flores MD      haloperidol  1 mg Oral Q6H PRN Yasmin Harvey MD      haloperidol  2.5 mg Oral Q4H PRN Max 4/day Yasmin Harvey MD      haloperidol  5 mg Oral Q4H PRN Max 4/day Yasmin Harvey MD      hydrocortisone   Topical 4x Daily PRN Eileen Jensen, STEVENP      hydrOXYzine HCL  100 mg Oral Q6H PRN Max 4/day Yasmin Harvey MD      Or    LORazepam  2 mg Intramuscular Q6H PRN Yasmin Harvey MD      hydrOXYzine HCL  25 mg Oral Q6H PRN Max 4/day Yasmin Harvey MD      lactated ringers  100 mL/hr Intravenous Continuous Elisa Workman MD Stopped (03/04/24 0713)    melatonin  3 mg Oral HS Yasmin Harvey MD      metFORMIN  500 mg Oral Daily With Breakfast Eileen  HOLLI Higuera      methocarbamol  500 mg Oral Q6H PRN HOLLI Monge      minocycline  100 mg Oral Q12H [facility] HOLLI Monge      mirtazapine  45 mg Oral HS La Flores MD      nicotine polacrilex  4 mg Oral Q2H PRN Yasmin Harvey MD      polyethylene glycol  17 g Oral Daily PRN Yasmin Harvey MD      polyethylene glycol  17 g Oral Daily HOLLI Monge      propranolol  10 mg Oral Q12H [facility] HOLLI Anderson      senna-docusate sodium  1 tablet Oral Daily PRN Yasmin Harvey MD      senna-docusate sodium  2 tablet Oral BID La Flores MD      traZODone  50 mg Oral HS PRN Yasmin Harvey MD      white petrolatum-mineral oil   Topical TID PRN Anjel Sagastume MD       Risks / Benefits of Treatment:    Risks, benefits, and possible side effects of medications explained to patient and patient verbalizes understanding and agreement for treatment.  Discussed risks and benefits of Electroconvulsive Treatment with patient including risks of anesthesia and memory loss. Alberto verbalized understanding and agreed for ECT.    Counseling / Coordination of Care:    Patient's progress discussed with staff in treatment team meeting.  Medications, treatment progress and treatment plan reviewed with patient.    HOLLI Anderson 03/04/24     Synthroid home dose 150mcg  Will check TSH

## 2024-03-04 NOTE — CASE MANAGEMENT
"Diversion meeting held with [facility] (Curtis Su and Nava Farley) and [facility] (Marilyn \"Arleth\" Geronimo)- it is understood that pt is doing ECT at this time. If patient improves from ECT and no longer requires continued hospitalization, EAC will be diverted, however, if continued hospitalization is deemed necessary following completion of ECT- all are in agreement with patient being transferred to [facility].     Pt's EAC referral has been reviewed by the EAC team and deemed appropriate for admission if ECT is not sufficient enough for stabilization and discharge.   "

## 2024-03-04 NOTE — PROCEDURES
Procedure Note - ECT  Alberto Berumen 28 y.o. male MRN: [id number]    Time out was taken with staff to confirm correct patient and correct procedure to be performed.    Session Number: 003    Diagnosis: Principal Problem:    Schizoaffective disorder, bipolar type (HCC)  Active Problems:    GERD (gastroesophageal reflux disease)    Tobacco abuse    T wave inversion in EKG    Chronic idiopathic constipation    Vitamin B 12 deficiency    Vitamin D deficiency    Acanthosis nigricans    Rash      ECT Type: Inpatient    Anesthesia: Methohexital    Electrode Placement: Bilateral    Energy level:  15 %      Seizure Duration     EE Sec.    EMG : 21 Sec    Post-ictal Suppression Index: 29.4 %    Results:Clinical seizure was satisfactory, Patient tolerated ECT well     Media Information    Document Information    Clinical Image - Mobile Device   ECT   2024 06:36   Attached To:   Hospital Encounter on 23   Source Information    Ted Acevedo MD   [facility]     Vitals:    24 0650   BP: (!) 171/77   Pulse: (!) 109   Resp: 20   Temp:    SpO2: 97%        Medication Administration - last 24 hours from 2024 0653 to 2024 0653         Date/Time Order Dose Route Action Action by     2024 2206 EST nicotine polacrilex (NICORETTE) gum 4 mg 4 mg Oral Given Abundio Salcedo RN     2024 2002 EST nicotine polacrilex (NICORETTE) gum 4 mg 4 mg Oral Given Abundio Salcedo RN     2024 1728 EST nicotine polacrilex (NICORETTE) gum 4 mg 4 mg Oral Given Abilio Carroll RN     2024 1242 EST nicotine polacrilex (NICORETTE) gum 4 mg 4 mg Oral Given Abilio Carroll RN     2024 1008 EST nicotine polacrilex (NICORETTE) gum 4 mg 4 mg Oral Given Abilio Carroll RN     2024 2129 EST melatonin tablet 3 mg 3 mg Oral Given Abundio Salcedo RN     2024 1008 EST polyethylene glycol (MIRALAX) packet 17 g 17 g Oral Given Abilio Carroll RN     2024 2129 EST  mirtazapine (REMERON) tablet 45 mg 45 mg Oral Given Abundio Salcedo RN     03/03/2024 1008 EST escitalopram (LEXAPRO) tablet 20 mg 20 mg Oral Given Abilio Carroll RN     03/03/2024 1017 EST metFORMIN (GLUCOPHAGE) tablet 500 mg 500 mg Oral Given Abilio Carroll RN     03/03/2024 2002 EST minocycline (MINOCIN) capsule 100 mg 100 mg Oral Given Abundio Salcedo RN     03/03/2024 1008 EST minocycline (MINOCIN) capsule 100 mg 100 mg Oral Given Abilio Carroll RN     03/03/2024 2129 EST propranolol (INDERAL) tablet 10 mg 10 mg Oral Given Abundio Salcedo RN     03/03/2024 1013 EST propranolol (INDERAL) tablet 10 mg 10 mg Oral Given Abilio Carroll RN     03/03/2024 2128 EST atropine (ISOPTO ATROPINE) 1 % ophthalmic solution 1 drop 1 drop Sublingual Given Abundio Salcedo RN     03/03/2024 1728 EST senna-docusate sodium (SENOKOT S) 8.6-50 mg per tablet 2 tablet 2 tablet Oral Given Abilio Carroll RN     03/03/2024 1008 EST senna-docusate sodium (SENOKOT S) 8.6-50 mg per tablet 2 tablet 2 tablet Oral Given Abilio Carroll RN     03/03/2024 2129 EST cloZAPine (CLOZARIL) tablet 450 mg 450 mg Oral Given Abundio Salcedo RN

## 2024-03-05 PROCEDURE — 99232 SBSQ HOSP IP/OBS MODERATE 35: CPT | Performed by: PSYCHIATRY & NEUROLOGY

## 2024-03-05 RX ADMIN — MELATONIN TAB 3 MG 3 MG: 3 TAB at 21:21

## 2024-03-05 RX ADMIN — NICOTINE POLACRILEX 4 MG: 4 GUM, CHEWING ORAL at 17:49

## 2024-03-05 RX ADMIN — SENNOSIDES AND DOCUSATE SODIUM 2 TABLET: 8.6; 5 TABLET ORAL at 17:21

## 2024-03-05 RX ADMIN — NICOTINE POLACRILEX 4 MG: 4 GUM, CHEWING ORAL at 20:56

## 2024-03-05 RX ADMIN — PROPRANOLOL HYDROCHLORIDE 10 MG: 10 TABLET ORAL at 08:23

## 2024-03-05 RX ADMIN — NICOTINE POLACRILEX 4 MG: 4 GUM, CHEWING ORAL at 12:50

## 2024-03-05 RX ADMIN — MINOCYCLINE HYDROCHLORIDE 100 MG: 100 CAPSULE ORAL at 21:16

## 2024-03-05 RX ADMIN — SENNOSIDES AND DOCUSATE SODIUM 2 TABLET: 8.6; 5 TABLET ORAL at 08:23

## 2024-03-05 RX ADMIN — MIRTAZAPINE 45 MG: 30 TABLET, FILM COATED ORAL at 21:15

## 2024-03-05 RX ADMIN — POLYETHYLENE GLYCOL 3350 17 G: 17 POWDER, FOR SOLUTION ORAL at 08:23

## 2024-03-05 RX ADMIN — PROPRANOLOL HYDROCHLORIDE 10 MG: 10 TABLET ORAL at 21:15

## 2024-03-05 RX ADMIN — ESCITALOPRAM OXALATE 20 MG: 10 TABLET ORAL at 08:23

## 2024-03-05 RX ADMIN — MINOCYCLINE HYDROCHLORIDE 100 MG: 100 CAPSULE ORAL at 08:23

## 2024-03-05 RX ADMIN — CLOZAPINE 450 MG: 25 TABLET ORAL at 21:15

## 2024-03-05 RX ADMIN — ATROPINE SULFATE 1 DROP: 10 SOLUTION/ DROPS OPHTHALMIC at 21:21

## 2024-03-05 RX ADMIN — METFORMIN HYDROCHLORIDE 500 MG: 500 TABLET ORAL at 08:23

## 2024-03-05 NOTE — PROGRESS NOTES
"Progress Note - Behavioral Health     Alberto Berumen 28 y.o. male MRN: [id number]   Unit/Bed#: [facility] 383-02 Encounter: [id number]    Documentation, nursing notes, medication reconciliation, labs, and vitals reviewed. Patient was seen for continuing care and reviewed with treatment team. No acute events over the past 24 hours.  Per nursing report, asleep in bed at intervals.  Social with peers and cooperative with staff while awake.  Continues to report auditory hallucinations.  Appears to be more isolative and depressed.    No medication changes over the past 24 hours. Continues to tolerate current medications with no adverse effects.     On evaluation today, he is seen in his room and resting in bed.  States he is \"not so good today\".  States he feels more tired since starting ECT.  States he also continues to have body aches that last be on the day of ECT.  Had ECT #3 yesterday.  States he feels no improvement yet in auditory hallucinations.  Does appear to be more depressed and frustrated.  States he is willing to continue with ECT at this time.  No further complaints of nausea or chest pain.    No suicidal ideation, plan, or intent. Ongoing perceptual disturbances and does not exhibit any symptoms of aimee on evaluation.    Psychiatric ROS:  Behavior over the last 24 hours: unchanged  Sleep: hypersomnia  Appetite: fair  Medication side effects: Yes - tired    ROS: all other systems are negative, as in HPI    Mental Status Evaluation:    Appearance:  marginal hygiene   Behavior:  cooperative   Speech:  decreased rate   Mood:  depressed   Affect:  flat   Thought Process:  goal directed   Associations: intact associations   Thought Content:  some paranoia   Perceptual Disturbances: auditory hallucinations   Risk Potential: Suicidal ideation - None  Homicidal ideation - None  Potential for aggression - No   Sensorium:  oriented to person, place, and time/date   Memory:  recent and remote memory grossly intact "   Consciousness:  alert and awake   Attention/Concentration: decreased concentration and decreased attention span   Insight:  limited   Judgment: limited   Gait/Station: normal gait/station, normal balance   Motor Activity: no abnormal movements     Vital signs in last 24 hours:    Temp:  [97.6 °F (36.4 °C)-98.2 °F (36.8 °C)] 97.6 °F (36.4 °C)  HR:  [] 99  Resp:  [18] 18  BP: (131-146)/(70-77) 146/70    Laboratory results: I have personally reviewed all pertinent laboratory/tests results    Results from the past 24 hours:   Recent Results (from the past 24 hour(s))   ECG 12 lead    Collection Time: 03/04/24  7:31 PM   Result Value Ref Range    Ventricular Rate 107 BPM    Atrial Rate 107 BPM    MO Interval 140 ms    QRSD Interval 86 ms    QT Interval 348 ms    QTC Interval 464 ms    P Axis 41 degrees    QRS Axis 53 degrees    T Wave Axis 44 degrees       Suicide/Homicide Risk Assessment:    Risk of Harm to Self:   Current Specific Risk Factors include: current depressive symptoms, presence of hallucinations  Protective Factors: no current suicidal ideation, ability to communicate with staff on the unit, able to contract for safety on the unit, taking medications as ordered on the unit  Based on today's assessment, Alberto presents the following risk of harm to self: minimal    Risk of Harm to Others:  Current Specific Risk Factors include: current psychotic symptoms  Protective Factors: no current homicidal ideation, able to communicate with staff on the unit, psychotic symptoms are less prominent, compliant with medications on the unit as ordered  Based on today's assessment, Alberto presents the following risk of harm to others: minimal    The following interventions are recommended: behavioral checks every 7 minutes, continued hospitalization on locked unit    Progress Toward Goals: minimal improvement, more depressed, still psychotic    Assessment/Plan   Principal Problem:    Schizoaffective disorder,  bipolar type (HCC)  Active Problems:    GERD (gastroesophageal reflux disease)    Tobacco abuse    T wave inversion in EKG    Chronic idiopathic constipation    Vitamin B 12 deficiency    Vitamin D deficiency    Acanthosis nigricans    Rash      Recommended Treatment:     Planned medication and treatment changes:    All current active medications have been reviewed  Encourage group therapy, milieu therapy and occupational therapy  Behavioral Health checks every 7 minutes  Extended Acute Care referral  Continue ECT #4 tomorrow  Check CPK, C-reactive protein, BNP, and troponin tomorrow  Monitor weekly CBC, CK, CRP, BNP, troponin and EKG on Monday    Current Facility-Administered Medications   Medication Dose Route Frequency Provider Last Rate    acetaminophen  650 mg Oral Q6H PRN Yasmin Harvey MD      acetaminophen  650 mg Oral Q4H PRN Yasmin Harvey MD      acetaminophen  975 mg Oral Q6H PRN Yasmin Harvey MD      aluminum-magnesium hydroxide-simethicone  30 mL Oral Q4H PRN Yasmin Harvey MD      atropine  1 drop Sublingual HS HOLLI Anderson      atropine  1 drop Sublingual Daily PRN HOLLI Anderson      haloperidol lactate  2.5 mg Intramuscular Q4H PRN Max 4/day Yasmin Harvey MD      And    LORazepam  1 mg Intramuscular Q4H PRN Max 4/day Yasmin Harvey MD      And    benztropine  0.5 mg Intramuscular Q4H PRN Max 4/day Yasmin Harvey MD      haloperidol lactate  5 mg Intramuscular Q4H PRN Max 4/day Yasmin Harvey MD      And    LORazepam  2 mg Intramuscular Q4H PRN Max 4/day Yasmin Harvey MD      And    benztropine  1 mg Intramuscular Q4H PRN Max 4/day Yasmin Harvey MD      benztropine  1 mg Oral Q4H PRN Max 6/day Yasmin Harvey MD      bisacodyl  10 mg Rectal Daily PRN Yasmin Harvey MD      cloZAPine  450 mg Oral HS HOLLI Anderson      hydrOXYzine HCL  50 mg Oral Q6H PRN Max 4/day Yasmin Harvey MD      Or    diphenhydrAMINE  50 mg Intramuscular Q6H PRN Yasmin JACKMAN  MD Candice      diphenhydrAMINE-zinc acetate   Topical BID PRN Anjel Sagastume MD      escitalopram  20 mg Oral Daily La Flores MD      haloperidol  1 mg Oral Q6H PRN Yasmin Harvey MD      haloperidol  2.5 mg Oral Q4H PRN Max 4/day Yasmin Harvey MD      haloperidol  5 mg Oral Q4H PRN Max 4/day Yasmin Harvey MD      hydrocortisone   Topical 4x Daily PRN HOLLI Monge      hydrOXYzine HCL  100 mg Oral Q6H PRN Max 4/day Yasmin Harvey MD      Or    LORazepam  2 mg Intramuscular Q6H PRN Yasmin Harvey MD      hydrOXYzine HCL  25 mg Oral Q6H PRN Max 4/day Yasmin Harvey MD      lactated ringers  100 mL/hr Intravenous Continuous Elisa Workman MD Stopped (03/04/24 0713)    melatonin  3 mg Oral HS Yasmin Harvey MD      metFORMIN  500 mg Oral Daily With Breakfast Eileen Jensen, HOLLI      methocarbamol  500 mg Oral Q6H PRN HOLLI Monge      minocycline  100 mg Oral Q12H KAYLIE HOLLI Monge      mirtazapine  45 mg Oral HS La Flores MD      nicotine polacrilex  4 mg Oral Q2H PRN Yasmin Harvey MD      polyethylene glycol  17 g Oral Daily PRN Yasmin Harvey MD      polyethylene glycol  17 g Oral Daily STEVE MongeNP      propranolol  10 mg Oral Q12H [facility] HOLLI Anderson      senna-docusate sodium  1 tablet Oral Daily PRN Yasmin Harvey MD      senna-docusate sodium  2 tablet Oral BID La Flores MD      traZODone  50 mg Oral HS PRN Yasmin Harvey MD      white petrolatum-mineral oil   Topical TID PRN Anjel Sagastume MD       Risks / Benefits of Treatment:    Risks, benefits, and possible side effects of medications explained to patient. Patient has limited understanding of risks and benefits of treatment at this time, but agrees to take medications as prescribed.  Discussed risks and benefits of Electroconvulsive Treatment with patient including risks of anesthesia and memory loss. Alberto  verbalized understanding and agreed for ECT.    Counseling / Coordination of Care:    Patient's progress discussed with staff in treatment team meeting.  Medications, treatment progress and treatment plan reviewed with patient.  At this time patient has limited understanding of diagnosis, medication changes and treatment plan and will require further explanation.    HOLLI Anderson 03/05/24

## 2024-03-05 NOTE — PLAN OF CARE
Problem: PSYCHOSIS  Goal: Will report no hallucinations or delusions  Description: Interventions:  - Administer medication as  ordered  - Every waking shifts and PRN assess for the presence of hallucinations and or delusions  - Assist with reality testing to support increasing orientation  - Assess if patient's hallucinations or delusions are encouraging self-harm or harm to others and intervene as appropriate  Outcome: Not Progressing

## 2024-03-05 NOTE — PROGRESS NOTES
03/05/24 1216   Team Meeting   Meeting Type Daily Rounds   Team Members Present   Team Members Present Physician;;Other (Discipline and Name);Nurse   Physician Team Member Mark   Nursing Team Member Giancarlo   Care Management Team Member Quinn/Miguelito   Other (Discipline and Name) Marco (therapist)   Patient/Family Present   Patient Present No   Patient's Family Present No   OTHER   Team Meeting - Additional Comments 201.  Withdrawn to self.  Recieved ECT yesterday.  Med and meal compliant.  D/C TBD.

## 2024-03-05 NOTE — NURSING NOTE
"Patient was withdrawn to self and not as bright or talkative with staff or peers this evening compared to previous nights. Denies depression, anxiety, SI, and HI. Reports auditory hallucinations that \"they are going to kill me\". Reassurance provided and patient encouraged to come to staff if medications do not help. Medication compliant. Staff availability reinforced.   "

## 2024-03-05 NOTE — NURSING NOTE
Pt asleep in bed most of shift, alert and oriented while awake. Pt is social with peers and cooperative with staff when awake. Pt is wearing his own clothing. Medication and meal compliant. Pt denies SI/HI/VH, but continues to have AH. Pt appears to be more quiet than previously and appears to be more depressed. No unmet needs this shift.

## 2024-03-06 ENCOUNTER — ANESTHESIA (INPATIENT)
Dept: PREOP/PACU | Facility: HOSPITAL | Age: 29
End: 2024-03-06
Payer: COMMERCIAL

## 2024-03-06 ENCOUNTER — APPOINTMENT (INPATIENT)
Dept: PREOP/PACU | Facility: HOSPITAL | Age: 29
DRG: 750 | End: 2024-03-06
Attending: STUDENT IN AN ORGANIZED HEALTH CARE EDUCATION/TRAINING PROGRAM
Payer: COMMERCIAL

## 2024-03-06 ENCOUNTER — ANESTHESIA EVENT (INPATIENT)
Dept: PREOP/PACU | Facility: HOSPITAL | Age: 29
End: 2024-03-06
Payer: COMMERCIAL

## 2024-03-06 LAB
BNP SERPL-MCNC: 13 PG/ML (ref 0–100)
CARDIAC TROPONIN I PNL SERPL HS: 3 NG/L (ref 8–18)
CK SERPL-CCNC: 437 U/L (ref 39–308)
CRP SERPL QL: 11.4 MG/L

## 2024-03-06 PROCEDURE — 82550 ASSAY OF CK (CPK): CPT | Performed by: NURSE PRACTITIONER

## 2024-03-06 PROCEDURE — 86140 C-REACTIVE PROTEIN: CPT | Performed by: NURSE PRACTITIONER

## 2024-03-06 PROCEDURE — GZB2ZZZ ELECTROCONVULSIVE THERAPY, BILATERAL-SINGLE SEIZURE: ICD-10-PCS | Performed by: PSYCHIATRY & NEUROLOGY

## 2024-03-06 PROCEDURE — 90870 ELECTROCONVULSIVE THERAPY: CPT | Performed by: PSYCHIATRY & NEUROLOGY

## 2024-03-06 PROCEDURE — 84484 ASSAY OF TROPONIN QUANT: CPT | Performed by: NURSE PRACTITIONER

## 2024-03-06 PROCEDURE — 83880 ASSAY OF NATRIURETIC PEPTIDE: CPT | Performed by: NURSE PRACTITIONER

## 2024-03-06 RX ORDER — LABETALOL HYDROCHLORIDE 5 MG/ML
INJECTION, SOLUTION INTRAVENOUS AS NEEDED
Status: DISCONTINUED | OUTPATIENT
Start: 2024-03-06 | End: 2024-03-06

## 2024-03-06 RX ORDER — HYDRALAZINE HYDROCHLORIDE 20 MG/ML
INJECTION INTRAMUSCULAR; INTRAVENOUS AS NEEDED
Status: DISCONTINUED | OUTPATIENT
Start: 2024-03-06 | End: 2024-03-06

## 2024-03-06 RX ORDER — ONDANSETRON 2 MG/ML
4 INJECTION INTRAMUSCULAR; INTRAVENOUS ONCE AS NEEDED
Status: CANCELLED | OUTPATIENT
Start: 2024-03-06

## 2024-03-06 RX ORDER — ESMOLOL HYDROCHLORIDE 10 MG/ML
INJECTION INTRAVENOUS AS NEEDED
Status: DISCONTINUED | OUTPATIENT
Start: 2024-03-06 | End: 2024-03-06

## 2024-03-06 RX ORDER — ONDANSETRON 2 MG/ML
4 INJECTION INTRAMUSCULAR; INTRAVENOUS ONCE AS NEEDED
Status: DISCONTINUED | OUTPATIENT
Start: 2024-03-06 | End: 2024-03-06 | Stop reason: HOSPADM

## 2024-03-06 RX ORDER — SODIUM CHLORIDE, SODIUM LACTATE, POTASSIUM CHLORIDE, CALCIUM CHLORIDE 600; 310; 30; 20 MG/100ML; MG/100ML; MG/100ML; MG/100ML
100 INJECTION, SOLUTION INTRAVENOUS CONTINUOUS
Status: CANCELLED | OUTPATIENT
Start: 2024-03-06

## 2024-03-06 RX ORDER — SUCCINYLCHOLINE/SOD CL,ISO/PF 100 MG/5ML
SYRINGE (ML) INTRAVENOUS AS NEEDED
Status: DISCONTINUED | OUTPATIENT
Start: 2024-03-06 | End: 2024-03-06

## 2024-03-06 RX ORDER — GLYCOPYRROLATE 0.2 MG/ML
INJECTION INTRAMUSCULAR; INTRAVENOUS AS NEEDED
Status: DISCONTINUED | OUTPATIENT
Start: 2024-03-06 | End: 2024-03-06

## 2024-03-06 RX ORDER — ALBUTEROL SULFATE 2.5 MG/3ML
2.5 SOLUTION RESPIRATORY (INHALATION) ONCE AS NEEDED
Status: CANCELLED | OUTPATIENT
Start: 2024-03-06

## 2024-03-06 RX ORDER — ONDANSETRON 4 MG/1
4 TABLET, ORALLY DISINTEGRATING ORAL EVERY 6 HOURS PRN
Status: DISCONTINUED | OUTPATIENT
Start: 2024-03-06 | End: 2024-03-29 | Stop reason: HOSPADM

## 2024-03-06 RX ORDER — SODIUM CHLORIDE, SODIUM LACTATE, POTASSIUM CHLORIDE, CALCIUM CHLORIDE 600; 310; 30; 20 MG/100ML; MG/100ML; MG/100ML; MG/100ML
100 INJECTION, SOLUTION INTRAVENOUS CONTINUOUS
Status: DISCONTINUED | OUTPATIENT
Start: 2024-03-06 | End: 2024-03-29 | Stop reason: HOSPADM

## 2024-03-06 RX ORDER — ALBUTEROL SULFATE 2.5 MG/3ML
2.5 SOLUTION RESPIRATORY (INHALATION) ONCE AS NEEDED
Status: DISCONTINUED | OUTPATIENT
Start: 2024-03-06 | End: 2024-03-06 | Stop reason: HOSPADM

## 2024-03-06 RX ORDER — METHOHEXITAL IN WATER/PF 100MG/10ML
SYRINGE (ML) INTRAVENOUS AS NEEDED
Status: DISCONTINUED | OUTPATIENT
Start: 2024-03-06 | End: 2024-03-06

## 2024-03-06 RX ADMIN — ACETAMINOPHEN 650 MG: 325 TABLET ORAL at 13:07

## 2024-03-06 RX ADMIN — Medication 50 MG: at 07:04

## 2024-03-06 RX ADMIN — ACETAMINOPHEN 650 MG: 325 TABLET ORAL at 08:28

## 2024-03-06 RX ADMIN — SENNOSIDES AND DOCUSATE SODIUM 2 TABLET: 8.6; 5 TABLET ORAL at 08:23

## 2024-03-06 RX ADMIN — ESCITALOPRAM OXALATE 20 MG: 10 TABLET ORAL at 08:23

## 2024-03-06 RX ADMIN — Medication 140 MG: at 07:04

## 2024-03-06 RX ADMIN — PROPRANOLOL HYDROCHLORIDE 10 MG: 10 TABLET ORAL at 21:02

## 2024-03-06 RX ADMIN — NICOTINE POLACRILEX 4 MG: 4 GUM, CHEWING ORAL at 13:08

## 2024-03-06 RX ADMIN — ATROPINE SULFATE 1 DROP: 10 SOLUTION/ DROPS OPHTHALMIC at 21:03

## 2024-03-06 RX ADMIN — ONDANSETRON 4 MG: 4 TABLET, ORALLY DISINTEGRATING ORAL at 08:24

## 2024-03-06 RX ADMIN — SENNOSIDES AND DOCUSATE SODIUM 2 TABLET: 8.6; 5 TABLET ORAL at 17:41

## 2024-03-06 RX ADMIN — LABETALOL 20 MG/4 ML (5 MG/ML) INTRAVENOUS SYRINGE 10 MG: at 07:01

## 2024-03-06 RX ADMIN — MELATONIN TAB 3 MG 3 MG: 3 TAB at 21:02

## 2024-03-06 RX ADMIN — PROPRANOLOL HYDROCHLORIDE 10 MG: 10 TABLET ORAL at 08:24

## 2024-03-06 RX ADMIN — NICOTINE POLACRILEX 4 MG: 4 GUM, CHEWING ORAL at 21:02

## 2024-03-06 RX ADMIN — MINOCYCLINE HYDROCHLORIDE 100 MG: 100 CAPSULE ORAL at 21:02

## 2024-03-06 RX ADMIN — LABETALOL 20 MG/4 ML (5 MG/ML) INTRAVENOUS SYRINGE 10 MG: at 07:18

## 2024-03-06 RX ADMIN — MINOCYCLINE HYDROCHLORIDE 100 MG: 100 CAPSULE ORAL at 08:24

## 2024-03-06 RX ADMIN — METFORMIN HYDROCHLORIDE 500 MG: 500 TABLET ORAL at 08:23

## 2024-03-06 RX ADMIN — MIRTAZAPINE 45 MG: 30 TABLET, FILM COATED ORAL at 21:02

## 2024-03-06 RX ADMIN — SODIUM CHLORIDE, SODIUM LACTATE, POTASSIUM CHLORIDE, AND CALCIUM CHLORIDE: .6; .31; .03; .02 INJECTION, SOLUTION INTRAVENOUS at 06:36

## 2024-03-06 RX ADMIN — GLYCOPYRROLATE 0.2 MG: 0.2 INJECTION INTRAMUSCULAR; INTRAVENOUS at 06:56

## 2024-03-06 RX ADMIN — HYDRALAZINE HYDROCHLORIDE 20 MG: 20 INJECTION INTRAMUSCULAR; INTRAVENOUS at 07:01

## 2024-03-06 RX ADMIN — CLOZAPINE 450 MG: 25 TABLET ORAL at 21:02

## 2024-03-06 RX ADMIN — POLYETHYLENE GLYCOL 3350 17 G: 17 POWDER, FOR SOLUTION ORAL at 08:24

## 2024-03-06 RX ADMIN — NICOTINE POLACRILEX 4 MG: 4 GUM, CHEWING ORAL at 17:53

## 2024-03-06 NOTE — PROCEDURES
Procedure Note - ECT  Alberto Berumen 28 y.o. male MRN: [id number]    Time out was taken with staff to confirm correct patient and correct procedure to be performed. This was the patient's fourth session of ECT. Prior to starting the procedure, the patient's questions and concerns were addressed. Patient denies changes in mood/ symptoms since last session of ECT. Patient reports shortness of breath since last session of ECT. Patient agreed to continuing treatment. Stimulus dose was 30% . Patient had a satisfactory seizure. No immediate side effects were noted after the procedure.     This procedure was performed in the presence of and under the direct supervision of Dr. Acevedo.    Recommendations next session of ECT: Consider further increasing energy level.    Session Number: 004    Diagnosis: Principal Problem:    Schizoaffective disorder, bipolar type ([facility])  Active Problems:    GERD (gastroesophageal reflux disease)    Tobacco abuse    T wave inversion in EKG    Chronic idiopathic constipation    Vitamin B 12 deficiency    Vitamin D deficiency    Acanthosis nigricans    Rash      ECT Type: Inpatient    Anesthesia: Methohexital    Electrode Placement: Bilateral    Energy level:  30 %      Seizure Duration     EE Sec.    EMG : 17 Sec (visual)    Post-ictal Suppression Index: 52.8 %    Results:Clinical seizure was borderline satisfactory, Patient tolerated ECT well      Vitals:    24 0745   BP: 127/70   Pulse: 97   Resp: 18   Temp: 98.1 °F (36.7 °C)   SpO2: 98%        Medication Administration - last 24 hours from 2024 0802 to 2024 0802         Date/Time Order Dose Route Action Action by     2024 2056 EST nicotine polacrilex (NICORETTE) gum 4 mg 4 mg Oral Given Janina Robertson RN     2024 1749 EST nicotine polacrilex (NICORETTE) gum 4 mg 4 mg Oral Given Татьяна Grant LPN     2024 1250 EST nicotine polacrilex (NICORETTE) gum 4 mg 4 mg Oral Given Jovanna SEGURA  MIGUEL Vega     03/05/2024 2121 EST melatonin tablet 3 mg 3 mg Oral Given Татьяна Grant LPN     03/05/2024 0823 EST polyethylene glycol (MIRALAX) packet 17 g 17 g Oral Given Esperanza Rosario LPN     03/05/2024 2115 EST mirtazapine (REMERON) tablet 45 mg 45 mg Oral Given Татьяна Grant LPN     03/05/2024 0823 EST escitalopram (LEXAPRO) tablet 20 mg 20 mg Oral Given Esperanza Rosario LPN     03/05/2024 0823 EST metFORMIN (GLUCOPHAGE) tablet 500 mg 500 mg Oral Given Esperanza Rosario LPN     03/05/2024 2116 EST minocycline (MINOCIN) capsule 100 mg 100 mg Oral Given Татьяна Grant LPN     03/05/2024 0823 EST minocycline (MINOCIN) capsule 100 mg 100 mg Oral Given Esperanza Rosario LPN     03/05/2024 2115 EST propranolol (INDERAL) tablet 10 mg 10 mg Oral Given Татьяна Grant LPN     03/05/2024 0823 EST propranolol (INDERAL) tablet 10 mg 10 mg Oral Given Esperanza Rosario LPN     03/05/2024 2121 EST atropine (ISOPTO ATROPINE) 1 % ophthalmic solution 1 drop 1 drop Sublingual Given Татьяна Grant LPJOHN     03/05/2024 1721 EST senna-docusate sodium (SENOKOT S) 8.6-50 mg per tablet 2 tablet 2 tablet Oral Given Татьяна Grant LPN     03/05/2024 0823 EST senna-docusate sodium (SENOKOT S) 8.6-50 mg per tablet 2 tablet 2 tablet Oral Given Esperanza Rosario LPN     03/05/2024 2115 EST cloZAPine (CLOZARIL) tablet 450 mg 450 mg Oral Given Татьянаjohnathan Grant LPJOHN     03/06/2024 0721 EST lactated ringers infusion -- Intravenous Anesthesia Volume Adjustment Olga Sheridan CRNA     03/06/2024 0654 EST lactated ringers infusion -- Intravenous Continue from Pre-op Olga Sheridan CRNA     03/06/2024 0636 EST lactated ringers infusion -- Intravenous New Bag CHRISTIAN Sánchez DO 03/06/24  Psychiatry Resident, PGY- I

## 2024-03-06 NOTE — ANESTHESIA POSTPROCEDURE EVALUATION
Post-Op Assessment Note    CV Status:  Stable    Pain management: adequate       Mental Status:  Alert   Hydration Status:  Stable   PONV Controlled:  None   Airway Patency:  Patent and adequate     Post Op Vitals Reviewed: Yes    No anethesia notable event occurred.    Staff: CRNA, Anesthesiologist               BP   189/102   Temp      Pulse 113   Resp 16   SpO2 95%

## 2024-03-06 NOTE — NURSING NOTE
Pt withdrawn to room drowsy following ECT this morning. Compliant with schedule medications. Declined breakfast. Pt denies HI/SI/VH, continues to report AH that are unchanged. Denies any unmet needs. Q 7 min safety checks maintained.

## 2024-03-06 NOTE — PROGRESS NOTES
03/06/24 1534   Team Members Present   Team Members Present Physician;Nurse;;Other (Discipline and Name)   Physician Team Member Mark   Nursing Team Member Giancarlo   Care Management Team Member Quinn/Miguelito   Other (Discipline and Name) Damon Lara   Patient/Family Present   Patient Present No   Patient's Family Present No   OTHER   Team Meeting - Additional Comments 201. Medication and meal compliant. Continues to endorse AH but denies SI/HI/VH.  D/C TBD

## 2024-03-06 NOTE — NURSING NOTE
Pt returned to unit from ECT#4. Denies headache. Pt reports dizziness, nausea and generalized pain upon return to the unit. Vitals obtained and WNL. Medical made aware.     0928: New order for PRN zofran in place and administered at 0824 with positive effect. PRN Tylenol 650mg administered at 0828 for generalized pain with positive effect.

## 2024-03-06 NOTE — NURSING NOTE
Patient denies all psychiatric symptoms accept for auditory hallucinations. Visible on unit compliant with medication and unit routine.

## 2024-03-06 NOTE — PROGRESS NOTES
Progress Note - Behavioral Health     Alberto Berumen 28 y.o. male MRN: [id number]   Unit/Bed#: [facility] 383-02 Encounter: [id number]    Documentation, nursing notes, medication reconciliation, labs, and vitals reviewed. Patient was seen for continuing care and reviewed with treatment team. No acute events over the past 24 hours.  Per nursing report, drowsy after ECT and refused breakfast.  Continues to report auditory hallucinations that are unchanged.  Presenting mood depressed.    No medication changes over the past 24 hours.  Received ECT #4 today.  Continues to tolerate current medications with no adverse effects.     On evaluation today, seen in his room and resting in his bed.  Had some nausea and headache post-ECT.  Continues to complain of body aches post-ECT.  Irritable post-ECT, states no improvements yet in depression or auditory hallucinations.  No suicidal ideation, plan, or intent.  Ongoing  perceptual disturbances and does not exhibit any symptoms of aimee on evaluation.    Psychiatric ROS:  Behavior over the last 24 hours: unchanged  Sleep: hypersomnia  Appetite: poor  Medication side effects:  feels tired from ECT and medications    ROS: all other systems are negative, see above    Mental Status Evaluation:    Appearance:  marginal hygiene   Behavior:  cooperative   Speech:  slow, scant   Mood:  depressed, irritable   Affect:  blunted   Thought Process:  decreased rate of thoughts   Associations: concrete associations   Thought Content:  some paranoia   Perceptual Disturbances: auditory hallucinations   Risk Potential: Suicidal ideation - None  Homicidal ideation - None  Potential for aggression - No   Sensorium:  oriented to person, place, and time/date   Memory:  recent and remote memory grossly intact   Consciousness:  alert and awake   Attention/Concentration: decreased concentration and decreased attention span   Insight:  limited   Judgment: limited   Gait/Station: normal gait/station, normal  balance   Motor Activity: no abnormal movements     Vital signs in last 24 hours:    Temp:  [97.3 °F (36.3 °C)-98.6 °F (37 °C)] 97.9 °F (36.6 °C)  HR:  [] 87  Resp:  [16-28] 18  BP: (122-189)/() 131/76    Laboratory results: I have personally reviewed all pertinent laboratory/tests results    Results from the past 24 hours: No results found for this or any previous visit (from the past 24 hour(s)).    Suicide/Homicide Risk Assessment:    Risk of Harm to Self:   Current Specific Risk Factors include: current depressive symptoms, presence of hallucinations  Protective Factors: no current suicidal ideation, ability to communicate with staff on the unit, able to contract for safety on the unit, taking medications as ordered on the unit  Based on today's assessment, Alberto presents the following risk of harm to self: minimal    Risk of Harm to Others:  Current Specific Risk Factors include: current psychotic symptoms  Protective Factors: no current homicidal ideation, able to communicate with staff on the unit, psychotic symptoms are less prominent, compliant with medications on the unit as ordered  Based on today's assessment, Alberto presents the following risk of harm to others: minimal    The following interventions are recommended: behavioral checks every 7 minutes, continued hospitalization on locked unit    Progress Toward Goals: limited improvement, more depressed, still psychotic    Assessment/Plan   Principal Problem:    Schizoaffective disorder, bipolar type (HCC)  Active Problems:    GERD (gastroesophageal reflux disease)    Tobacco abuse    T wave inversion in EKG    Chronic idiopathic constipation    Vitamin B 12 deficiency    Vitamin D deficiency    Acanthosis nigricans    Rash      Recommended Treatment:     Planned medication and treatment changes:    All current active medications have been reviewed  Encourage group therapy, milieu therapy and occupational therapy  Behavioral Health checks  every 7 minutes  Consider Extended Acute Care referral  Continue ECT #5 in 2 days  Requires continued inpatient treatment due to chronic illness and high risk of decompensation if discharged before long term stability is achieved  Check ECG on Monday for Clozaril monitoring  Recheck cardiac labs today, awaiting results, follow-up from chest pain over the weekend  Monitor cardiac labs again on Monday due to Clozaril  CBC weekly for Clozaril monitoring    Continue all other psychiatric medications the same    Current Facility-Administered Medications   Medication Dose Route Frequency Provider Last Rate    acetaminophen  650 mg Oral Q6H PRN Yasmin Harvey MD      acetaminophen  650 mg Oral Q4H PRN Yasmin Harvey MD      acetaminophen  975 mg Oral Q6H PRN Yasmin Harvey MD      aluminum-magnesium hydroxide-simethicone  30 mL Oral Q4H PRN Ysamin Harvey MD      atropine  1 drop Sublingual HS HOLLI Anderson      atropine  1 drop Sublingual Daily PRN HOLLI Anderson      haloperidol lactate  2.5 mg Intramuscular Q4H PRN Max 4/day Yasmin Harvey MD      And    LORazepam  1 mg Intramuscular Q4H PRN Max 4/day Yasmin Harvey MD      And    benztropine  0.5 mg Intramuscular Q4H PRN Max 4/day Yasmin Harvey MD      haloperidol lactate  5 mg Intramuscular Q4H PRN Max 4/day Yasmin Harvey MD      And    LORazepam  2 mg Intramuscular Q4H PRN Max 4/day Yasmin Harvey MD      And    benztropine  1 mg Intramuscular Q4H PRN Max 4/day Yasmin Harvey MD      benztropine  1 mg Oral Q4H PRN Max 6/day Yasmin Harvey MD      bisacodyl  10 mg Rectal Daily PRN Yasmin Harvey MD      cloZAPine  450 mg Oral HS HOLLI Anderson      hydrOXYzine HCL  50 mg Oral Q6H PRN Max 4/day Yasmin Harvey MD      Or    diphenhydrAMINE  50 mg Intramuscular Q6H PRN Yasmin Harvey MD      diphenhydrAMINE-zinc acetate   Topical BID PRN Anjel Sagastume MD      escitalopram  20 mg Oral Daily La Flores MD       haloperidol  1 mg Oral Q6H PRN Yasmin Harvey MD      haloperidol  2.5 mg Oral Q4H PRN Max 4/day Yasmin Harvey MD      haloperidol  5 mg Oral Q4H PRN Max 4/day Yasmin Harvey MD      hydrocortisone   Topical 4x Daily PRN HOLLI Monge      hydrOXYzine HCL  100 mg Oral Q6H PRN Max 4/day Yasmin Harvey MD      Or    LORazepam  2 mg Intramuscular Q6H PRN Yasmin Harvey MD      hydrOXYzine HCL  25 mg Oral Q6H PRN Max 4/day Yasmin Harvey MD      lactated ringers  100 mL/hr Intravenous Continuous Elisa Workman MD 0 mL/hr (03/04/24 0713)    lactated ringers  100 mL/hr Intravenous Continuous Elisa Workman MD      melatonin  3 mg Oral HS Yasmin Harvey MD      metFORMIN  500 mg Oral Daily With Breakfast HOLLI Monge      methocarbamol  500 mg Oral Q6H PRN HOLLI Monge      minocycline  100 mg Oral Q12H [facility] HOLLI Monge      mirtazapine  45 mg Oral HS La Flores MD      nicotine polacrilex  4 mg Oral Q2H PRN Yasmin Harvey MD      ondansetron  4 mg Oral Q6H PRN HOLLI Galvan      polyethylene glycol  17 g Oral Daily PRN Yasmin Harvey MD      polyethylene glycol  17 g Oral Daily HOLLI Monge      propranolol  10 mg Oral Q12H [facility] HOLLI Anderson      senna-docusate sodium  1 tablet Oral Daily PRN Yasmin Harvey MD      senna-docusate sodium  2 tablet Oral BID La Flores MD      traZODone  50 mg Oral HS PRN Yasmin Harvey MD      white petrolatum-mineral oil   Topical TID PRN Anjel Sagastume MD       Risks / Benefits of Treatment:    Risks, benefits, and possible side effects of medications explained to patient. Patient has limited understanding of risks and benefits of treatment at this time, but agrees to take medications as prescribed.  Discussed risks and benefits of Electroconvulsive Treatment with patient including risks of anesthesia and memory loss. Alberto jacksonized  understanding and agreed for ECT.    Counseling / Coordination of Care:    Patient's progress discussed with staff in treatment team meeting.  Medications, treatment progress and treatment plan reviewed with patient.    HOLLI Anderson 03/06/24

## 2024-03-07 PROCEDURE — 99232 SBSQ HOSP IP/OBS MODERATE 35: CPT | Performed by: PSYCHIATRY & NEUROLOGY

## 2024-03-07 RX ORDER — MIRTAZAPINE 30 MG/1
30 TABLET, FILM COATED ORAL
Status: DISCONTINUED | OUTPATIENT
Start: 2024-03-07 | End: 2024-03-08

## 2024-03-07 RX ADMIN — METFORMIN HYDROCHLORIDE 500 MG: 500 TABLET ORAL at 08:36

## 2024-03-07 RX ADMIN — ATROPINE SULFATE 1 DROP: 10 SOLUTION/ DROPS OPHTHALMIC at 21:18

## 2024-03-07 RX ADMIN — CLOZAPINE 450 MG: 25 TABLET ORAL at 21:18

## 2024-03-07 RX ADMIN — PROPRANOLOL HYDROCHLORIDE 10 MG: 10 TABLET ORAL at 21:18

## 2024-03-07 RX ADMIN — NICOTINE POLACRILEX 4 MG: 4 GUM, CHEWING ORAL at 15:22

## 2024-03-07 RX ADMIN — SENNOSIDES AND DOCUSATE SODIUM 2 TABLET: 8.6; 5 TABLET ORAL at 08:36

## 2024-03-07 RX ADMIN — ESCITALOPRAM OXALATE 20 MG: 10 TABLET ORAL at 08:36

## 2024-03-07 RX ADMIN — MINOCYCLINE HYDROCHLORIDE 100 MG: 100 CAPSULE ORAL at 08:36

## 2024-03-07 RX ADMIN — MELATONIN TAB 3 MG 3 MG: 3 TAB at 21:18

## 2024-03-07 RX ADMIN — NICOTINE POLACRILEX 4 MG: 4 GUM, CHEWING ORAL at 20:13

## 2024-03-07 RX ADMIN — NICOTINE POLACRILEX 4 MG: 4 GUM, CHEWING ORAL at 12:50

## 2024-03-07 RX ADMIN — NICOTINE POLACRILEX 4 MG: 4 GUM, CHEWING ORAL at 17:44

## 2024-03-07 RX ADMIN — MIRTAZAPINE 30 MG: 30 TABLET, FILM COATED ORAL at 21:18

## 2024-03-07 RX ADMIN — PROPRANOLOL HYDROCHLORIDE 10 MG: 10 TABLET ORAL at 08:36

## 2024-03-07 RX ADMIN — SENNOSIDES AND DOCUSATE SODIUM 2 TABLET: 8.6; 5 TABLET ORAL at 17:23

## 2024-03-07 RX ADMIN — ACETAMINOPHEN 650 MG: 325 TABLET ORAL at 21:18

## 2024-03-07 RX ADMIN — MINOCYCLINE HYDROCHLORIDE 100 MG: 100 CAPSULE ORAL at 21:18

## 2024-03-07 NOTE — NURSING NOTE
Pt is drowsy and isolative to room. Pt is calm and cooperative upon approach. Pt denies SI, HI, and VH. Pt still endorsing AH. Denies any needs at this time.

## 2024-03-07 NOTE — PLAN OF CARE
Problem: PSYCHOSIS  Goal: Will report no hallucinations or delusions  Description: Interventions:  - Administer medication as  ordered  - Every waking shifts and PRN assess for the presence of hallucinations and or delusions  - Assist with reality testing to support increasing orientation  - Assess if patient's hallucinations or delusions are encouraging self-harm or harm to others and intervene as appropriate  Outcome: Progressing     Problem: ANXIETY  Goal: Will report anxiety at manageable levels  Description: INTERVENTIONS:  - Administer medication as ordered  - Teach and encourage coping skills  - Provide emotional support  - Assess patient/family for anxiety and ability to cope  Outcome: Progressing     Problem: Ineffective Coping  Goal: Participates in unit activities  Description: Interventions:  - Provide therapeutic environment   - Provide required programming   - Redirect inappropriate behaviors   Outcome: Progressing     Problem: Depression  Goal: Treatment Goal: Demonstrate behavioral control of depressive symptoms, verbalize feelings of improved mood/affect, and adopt new coping skills prior to discharge  Outcome: Progressing  Goal: Verbalize thoughts and feelings  Description: Interventions:  - Assess and re-assess patient's level of risk   - Engage patient in 1:1 interactions, daily, for a minimum of 15 minutes   - Encourage patient to express feelings, fears, frustrations, hopes   Outcome: Progressing  Goal: Refrain from isolation  Description: Interventions:  - Develop a trusting relationship   - Encourage socialization   Outcome: Progressing  Goal: Refrain from self-neglect  Outcome: Progressing     Problem: DISCHARGE PLANNING  Goal: Discharge to home or other facility with appropriate resources  Description: INTERVENTIONS:  - Identify barriers to discharge w/patient and caregiver  - Arrange for needed discharge resources and transportation as appropriate  - Identify discharge learning needs  (meds, wound care, etc.)  - Arrange for interpretive services to assist at discharge as needed  - Refer to Case Management Department for coordinating discharge planning if the patient needs post-hospital services based on physician/advanced practitioner order or complex needs related to functional status, cognitive ability, or social support system  Outcome: Progressing     Problem: Electroconvulsive therapy (ECT)  Goal: Treatment Goal: Demonstrate a reduction of confusion and improved orientation to person, place, time and/or situation upon discharge, according to optimum baseline/ability  Outcome: Progressing  Goal: Verbalizes/displays adequate comfort level or baseline comfort level  Description: Interventions:  - Encourage patient to monitor pain and request assistance  - Assess pain using appropriate pain scale  - Administer analgesics based on type and severity of pain and evaluate response  - Implement non-pharmacological measures as appropriate and evaluate response  - Consider cultural and social influences on pain and pain management  - Notify physician/advanced practitioner if interventions unsuccessful or patient reports new pain  Outcome: Progressing  Goal: Achieves stable or improved neurological status  Description: INTERVENTIONS  - Monitor and report changes in neurological status  - Monitor vital signs such as temperature, blood pressure, glucose, and any other labs ordered   - Initiate measures to prevent increased intracranial pressure  - Monitor for seizure activity and implement precautions if appropriate      Outcome: Progressing  Goal: Achieves maximal functionality and self care  Description: INTERVENTIONS  - Monitor swallowing and airway patency with patient fatigue and changes in neurological status  - Encourage and assist patient to increase activity and self care.   - Encourage visually impaired, hearing impaired and aphasic patients to use assistive/communication devices  Outcome:  Progressing  Goal: Maintain or return mobility to safest level of function  Description: INTERVENTIONS:  - Assess patient's ability to carry out ADLs; assess patient's baseline for ADL function and identify physical deficits which impact ability to perform ADLs (bathing, care of mouth/teeth, toileting, grooming, dressing, etc.)  - Assess/evaluate cause of self-care deficits   - Assess range of motion  - Assess patient's mobility  - Assess patient's need for assistive devices and provide as appropriate  - Encourage maximum independence but intervene and supervise when necessary  - Involve family in performance of ADLs  - Assess for home care needs following discharge   - Consider OT consult to assist with ADL evaluation and planning for discharge  - Provide patient education as appropriate  Outcome: Progressing  Goal: Absence of urinary retention  Description: INTERVENTIONS:  - Assess patient’s ability to void and empty bladder  - Monitor I/O  - Bladder scan as needed  - Discuss with physician/AP medications to alleviate retention as needed  - Discuss catheterization for long term situations as appropriate  Outcome: Progressing  Goal: Minimal or absence of nausea and/or vomiting  Description: INTERVENTIONS:  - Administer IV fluids if ordered to ensure adequate hydration  - Maintain NPO status until nausea and vomiting are resolved  - Nasogastric tube if ordered  - Administer ordered antiemetic medications as needed  - Provide nonpharmacologic comfort measures as appropriate  - Advance diet as tolerated, if ordered  - Consider nutrition services referral to assist patient with adequate nutrition and appropriate food choices  Outcome: Progressing  Goal: Maintains adequate nutritional intake  Description: INTERVENTIONS:  - Monitor percentage of each meal consumed  - Identify factors contributing to decreased intake, treat as appropriate  - Assist with meals as needed  - Monitor I&O, weight, and lab values if  indicated  - Obtain nutrition services referral as needed  Outcome: Progressing

## 2024-03-07 NOTE — NURSING NOTE
Pt visible, pleasant on approach. Mostly withdrawn throughout shift. Denies si.hi.vh. +ah without command. Compliant with evening meds. Continued q7m checks for safety

## 2024-03-07 NOTE — PROGRESS NOTES
Progress Note - Behavioral Health     Alberto Berumen 28 y.o. male MRN: [id number]   Unit/Bed#: [facility] 383-02 Encounter: [id number]    Documentation, nursing notes, medication reconciliation, labs, and vitals reviewed. Patient was seen for continuing care and reviewed with treatment team. No acute events over the past 24 hours.  Per nursing report, has been spending more time napping.  Still endorsing no change in auditory hallucinations.  Frustrated with ECT.    No medication changes over the past 24 hours.  Scheduled for ECT #5 tomorrow.  Continues to tolerate current medications with no adverse effects.     On evaluation today, he is seen in his room and resting in bed.  Does nap at intervals.  Does state he feels more tired since starting ECT.  We did discuss lowering his Remeron and he is agreeable to same.  He does have body aches and generally feeling poorly after ECT.  Has seen no improvements yet in hallucinations with ECT.  However, he does agree to continue with treatment as 5 and 6 and then we will reevaluate at that time.  No suicidal ideation, plan, or intent.  Ongoing perceptual disturbances and does not exhibit any symptoms of aimee on evaluation.    Psychiatric ROS:  Behavior over the last 24 hours: unchanged  Sleep: hypersomnia  Appetite: fair  Medication side effects: Yes - tiredness    ROS: all other systems are negative, will complain of body aches on day of ECT, also occasional nausea.  No further complaints of chest pain since the weekend.    Mental Status Evaluation:    Appearance:  marginal hygiene   Behavior:  cooperative   Speech:  decreased rate   Mood:  depressed   Affect:  blunted   Thought Process:  decreased rate of thoughts   Associations: concrete associations   Thought Content:  some paranoia   Perceptual Disturbances: auditory hallucinations   Risk Potential: Suicidal ideation - None  Homicidal ideation - None  Potential for aggression - No   Sensorium:  oriented to person, place,  and time/date   Memory:  recent and remote memory grossly intact   Consciousness:  alert and awake   Attention/Concentration: decreased concentration and decreased attention span   Insight:  limited   Judgment: limited   Gait/Station: normal gait/station, normal balance   Motor Activity: no abnormal movements     Vital signs in last 24 hours:    Temp:  [98.3 °F (36.8 °C)-98.7 °F (37.1 °C)] 98.7 °F (37.1 °C)  HR:  [] 103  Resp:  [16] 16  BP: (128-155)/(60-93) 155/93    Laboratory results: I have personally reviewed all pertinent laboratory/tests results    Results from the past 24 hours:   Recent Results (from the past 24 hour(s))   B-Type Natriuretic Peptide(BNP)    Collection Time: 03/06/24  1:18 PM   Result Value Ref Range    BNP 13 0 - 100 pg/mL   CK    Collection Time: 03/06/24  1:18 PM   Result Value Ref Range    Total  (H) 39 - 308 U/L   High Sensitivity Troponin I Random    Collection Time: 03/06/24  1:18 PM   Result Value Ref Range    HS TnI random 3 (L) 8 - 18 ng/L   C-reactive protein    Collection Time: 03/06/24  1:18 PM   Result Value Ref Range    CRP 11.4 (H) <3.0 mg/L       Suicide/Homicide Risk Assessment:    Risk of Harm to Self:   Current Specific Risk Factors include: current depressive symptoms, current psychotic symptoms  Protective Factors: no current suicidal ideation, ability to communicate with staff on the unit, able to contract for safety on the unit, taking medications as ordered on the unit  Based on today's assessment, Alberto presents the following risk of harm to self: minimal    Risk of Harm to Others:  Current Specific Risk Factors include: current psychotic symptoms  Protective Factors: no current homicidal ideation, able to communicate with staff on the unit, psychotic symptoms are less prominent, compliant with medications on the unit as ordered  Based on today's assessmentAlberto presents the following risk of harm to others: minimal    The following interventions  are recommended: behavioral checks every 7 minutes, continued hospitalization on locked unit    Progress Toward Goals: no significant improvement, regressed, more depressed, still psychotic    Assessment/Plan   Principal Problem:    Schizoaffective disorder, bipolar type ([facility])  Active Problems:    GERD (gastroesophageal reflux disease)    Tobacco abuse    T wave inversion in EKG    Chronic idiopathic constipation    Vitamin B 12 deficiency    Vitamin D deficiency    Acanthosis nigricans    Rash      Recommended Treatment:     Planned medication and treatment changes:    All current active medications have been reviewed  Encourage group therapy, milieu therapy and occupational therapy  Behavioral Health checks every 7 minutes  Consider Extended Acute Care referral  Continue ECT #5 tomorrow  Decrease Remeron to 30 mg at bedtime with plan to continue to titrate off this medication as tolerated due to daytime tiredness  Check CK, troponin, BNP, CRP x 1 on Monday, Clozaril dose now stable x 2 weeks and no further episodes of chest pain since the weekend  Monitor weekly CBC for Clozaril monitoring  Continue all other medications:    Current Facility-Administered Medications   Medication Dose Route Frequency Provider Last Rate    acetaminophen  650 mg Oral Q6H PRN Yasmin Harvey MD      acetaminophen  650 mg Oral Q4H PRN Yasmin Harvey MD      acetaminophen  975 mg Oral Q6H PRN Yasmin Harvey MD      aluminum-magnesium hydroxide-simethicone  30 mL Oral Q4H PRN Yasmin Harvey MD      atropine  1 drop Sublingual HS HOLLI Anderson      atropine  1 drop Sublingual Daily PRN HOLLI Anderson      haloperidol lactate  2.5 mg Intramuscular Q4H PRN Max 4/day Yasmin Harvey MD      And    LORazepam  1 mg Intramuscular Q4H PRN Max 4/day Yasmin Harvey MD      And    benztropine  0.5 mg Intramuscular Q4H PRN Max 4/day Yasmin Harvey MD      haloperidol lactate  5 mg Intramuscular Q4H PRN Max 4/day Yasmin JACKMAN  MD Candice      And    LORazepam  2 mg Intramuscular Q4H PRN Max 4/day Yasmin Harvey MD      And    benztropine  1 mg Intramuscular Q4H PRN Max 4/day Yasmin Harvey MD      benztropine  1 mg Oral Q4H PRN Max 6/day Yasmin Harvey MD      bisacodyl  10 mg Rectal Daily PRN Yasmin Harvey MD      cloZAPine  450 mg Oral HS Prisca Villafuerte, HOLLI      hydrOXYzine HCL  50 mg Oral Q6H PRN Max 4/day Yasmin Harvey MD      Or    diphenhydrAMINE  50 mg Intramuscular Q6H PRN Yasmin Harvey MD      diphenhydrAMINE-zinc acetate   Topical BID PRN Anjel Sagastume MD      escitalopram  20 mg Oral Daily La Flores MD      haloperidol  1 mg Oral Q6H PRN Yasmin Harvey MD      haloperidol  2.5 mg Oral Q4H PRN Max 4/day Yasmin Harvey MD      haloperidol  5 mg Oral Q4H PRN Max 4/day Yasmin Harvey MD      hydrocortisone   Topical 4x Daily PRN HOLLI Monge      hydrOXYzine HCL  100 mg Oral Q6H PRN Max 4/day Yasmin Harvey MD      Or    LORazepam  2 mg Intramuscular Q6H PRN Yasmin Harvey MD      hydrOXYzine HCL  25 mg Oral Q6H PRN Max 4/day Yasmin Harvey MD      lactated ringers  100 mL/hr Intravenous Continuous Elisa Workman MD 0 mL/hr (03/04/24 0713)    lactated ringers  100 mL/hr Intravenous Continuous Elisa Workman MD      melatonin  3 mg Oral HS Yasmin Harvey MD      metFORMIN  500 mg Oral Daily With Breakfast HOLLI Monge      methocarbamol  500 mg Oral Q6H PRN HOLLI Monge      minocycline  100 mg Oral Q12H KAYLIE HOLLI Monge      mirtazapine  30 mg Oral HS HOLLI Anderson      nicotine polacrilex  4 mg Oral Q2H PRN Yasmin Harvey MD      ondansetron  4 mg Oral Q6H PRN HOLLI Galvan      polyethylene glycol  17 g Oral Daily PRN Yasmin Harvey MD      polyethylene glycol  17 g Oral Daily HOLLI Monge      propranolol  10 mg Oral Q12H [facility] HOLLI Anderson-docusate  sodium  1 tablet Oral Daily PRN Yasmin Harvey MD      senna-docusate sodium  2 tablet Oral BID La Flores MD      traZODone  50 mg Oral HS PRN Yasmin Harvey MD      white petrolatum-mineral oil   Topical TID PRN Anjel Sagastume MD       Risks / Benefits of Treatment:    Risks, benefits, and possible side effects of medications explained to patient and patient verbalizes understanding and agreement for treatment.  Discussed risks and benefits of Electroconvulsive Treatment with patient including risks of anesthesia and memory loss. Alberto verbalized understanding and agreed for ECT.    Counseling / Coordination of Care:    Patient's progress discussed with staff in treatment team meeting.  Medications, treatment progress and treatment plan reviewed with patient.    HOLLI Anderson 03/07/24

## 2024-03-08 ENCOUNTER — APPOINTMENT (INPATIENT)
Dept: PREOP/PACU | Facility: HOSPITAL | Age: 29
DRG: 750 | End: 2024-03-08
Attending: STUDENT IN AN ORGANIZED HEALTH CARE EDUCATION/TRAINING PROGRAM
Payer: COMMERCIAL

## 2024-03-08 ENCOUNTER — ANESTHESIA EVENT (INPATIENT)
Dept: PREOP/PACU | Facility: HOSPITAL | Age: 29
End: 2024-03-08
Payer: COMMERCIAL

## 2024-03-08 ENCOUNTER — ANESTHESIA (INPATIENT)
Dept: PREOP/PACU | Facility: HOSPITAL | Age: 29
End: 2024-03-08
Payer: COMMERCIAL

## 2024-03-08 PROBLEM — E66.812 CLASS 2 OBESITY IN ADULT: Status: ACTIVE | Noted: 2024-03-08

## 2024-03-08 PROBLEM — E66.9 CLASS 2 OBESITY IN ADULT: Status: ACTIVE | Noted: 2024-03-08

## 2024-03-08 PROCEDURE — 90870 ELECTROCONVULSIVE THERAPY: CPT

## 2024-03-08 PROCEDURE — GZB2ZZZ ELECTROCONVULSIVE THERAPY, BILATERAL-SINGLE SEIZURE: ICD-10-PCS | Performed by: STUDENT IN AN ORGANIZED HEALTH CARE EDUCATION/TRAINING PROGRAM

## 2024-03-08 PROCEDURE — 90870 ELECTROCONVULSIVE THERAPY: CPT | Performed by: STUDENT IN AN ORGANIZED HEALTH CARE EDUCATION/TRAINING PROGRAM

## 2024-03-08 RX ORDER — IBUPROFEN 600 MG/1
600 TABLET ORAL EVERY 8 HOURS PRN
Status: DISCONTINUED | OUTPATIENT
Start: 2024-03-08 | End: 2024-03-29 | Stop reason: HOSPADM

## 2024-03-08 RX ORDER — GLYCOPYRROLATE 0.2 MG/ML
INJECTION INTRAMUSCULAR; INTRAVENOUS AS NEEDED
Status: DISCONTINUED | OUTPATIENT
Start: 2024-03-08 | End: 2024-03-08

## 2024-03-08 RX ORDER — METHOHEXITAL IN WATER/PF 100MG/10ML
SYRINGE (ML) INTRAVENOUS AS NEEDED
Status: DISCONTINUED | OUTPATIENT
Start: 2024-03-08 | End: 2024-03-08

## 2024-03-08 RX ORDER — HYDRALAZINE HYDROCHLORIDE 20 MG/ML
INJECTION INTRAMUSCULAR; INTRAVENOUS AS NEEDED
Status: DISCONTINUED | OUTPATIENT
Start: 2024-03-08 | End: 2024-03-08

## 2024-03-08 RX ORDER — SODIUM CHLORIDE, SODIUM LACTATE, POTASSIUM CHLORIDE, CALCIUM CHLORIDE 600; 310; 30; 20 MG/100ML; MG/100ML; MG/100ML; MG/100ML
INJECTION, SOLUTION INTRAVENOUS CONTINUOUS PRN
Status: DISCONTINUED | OUTPATIENT
Start: 2024-03-08 | End: 2024-03-08

## 2024-03-08 RX ORDER — SUCCINYLCHOLINE/SOD CL,ISO/PF 100 MG/5ML
SYRINGE (ML) INTRAVENOUS AS NEEDED
Status: DISCONTINUED | OUTPATIENT
Start: 2024-03-08 | End: 2024-03-08

## 2024-03-08 RX ORDER — MIRTAZAPINE 15 MG/1
15 TABLET, FILM COATED ORAL
Status: DISCONTINUED | OUTPATIENT
Start: 2024-03-08 | End: 2024-03-08

## 2024-03-08 RX ORDER — ESMOLOL HYDROCHLORIDE 10 MG/ML
INJECTION INTRAVENOUS AS NEEDED
Status: DISCONTINUED | OUTPATIENT
Start: 2024-03-08 | End: 2024-03-08

## 2024-03-08 RX ORDER — MIRTAZAPINE 15 MG/1
15 TABLET, FILM COATED ORAL
Status: COMPLETED | OUTPATIENT
Start: 2024-03-08 | End: 2024-03-09

## 2024-03-08 RX ORDER — LABETALOL HYDROCHLORIDE 5 MG/ML
INJECTION, SOLUTION INTRAVENOUS AS NEEDED
Status: DISCONTINUED | OUTPATIENT
Start: 2024-03-08 | End: 2024-03-08

## 2024-03-08 RX ADMIN — PROPRANOLOL HYDROCHLORIDE 10 MG: 10 TABLET ORAL at 09:38

## 2024-03-08 RX ADMIN — Medication 50 MG: at 07:02

## 2024-03-08 RX ADMIN — NICOTINE POLACRILEX 4 MG: 4 GUM, CHEWING ORAL at 13:49

## 2024-03-08 RX ADMIN — Medication 140 MG: at 06:59

## 2024-03-08 RX ADMIN — HYDRALAZINE HYDROCHLORIDE 10 MG: 20 INJECTION INTRAMUSCULAR; INTRAVENOUS at 06:57

## 2024-03-08 RX ADMIN — ESCITALOPRAM OXALATE 20 MG: 10 TABLET ORAL at 08:30

## 2024-03-08 RX ADMIN — PROPRANOLOL HYDROCHLORIDE 10 MG: 10 TABLET ORAL at 21:08

## 2024-03-08 RX ADMIN — ATROPINE SULFATE 1 DROP: 10 SOLUTION/ DROPS OPHTHALMIC at 21:08

## 2024-03-08 RX ADMIN — SODIUM CHLORIDE, SODIUM LACTATE, POTASSIUM CHLORIDE, AND CALCIUM CHLORIDE: .6; .31; .03; .02 INJECTION, SOLUTION INTRAVENOUS at 06:15

## 2024-03-08 RX ADMIN — CLOZAPINE 450 MG: 25 TABLET ORAL at 21:08

## 2024-03-08 RX ADMIN — POLYETHYLENE GLYCOL 3350 17 G: 17 POWDER, FOR SOLUTION ORAL at 08:30

## 2024-03-08 RX ADMIN — MELATONIN TAB 3 MG 3 MG: 3 TAB at 21:09

## 2024-03-08 RX ADMIN — IBUPROFEN 600 MG: 600 TABLET ORAL at 14:23

## 2024-03-08 RX ADMIN — GLYCOPYRROLATE 0.2 MG: 0.2 INJECTION INTRAMUSCULAR; INTRAVENOUS at 06:55

## 2024-03-08 RX ADMIN — MINOCYCLINE HYDROCHLORIDE 100 MG: 100 CAPSULE ORAL at 08:29

## 2024-03-08 RX ADMIN — ACETAMINOPHEN 650 MG: 325 TABLET ORAL at 16:17

## 2024-03-08 RX ADMIN — NICOTINE POLACRILEX 4 MG: 4 GUM, CHEWING ORAL at 20:32

## 2024-03-08 RX ADMIN — NICOTINE POLACRILEX 4 MG: 4 GUM, CHEWING ORAL at 17:31

## 2024-03-08 RX ADMIN — METFORMIN HYDROCHLORIDE 500 MG: 500 TABLET ORAL at 08:29

## 2024-03-08 RX ADMIN — MIRTAZAPINE 15 MG: 15 TABLET, FILM COATED ORAL at 21:09

## 2024-03-08 RX ADMIN — SENNOSIDES AND DOCUSATE SODIUM 2 TABLET: 8.6; 5 TABLET ORAL at 08:29

## 2024-03-08 RX ADMIN — ACETAMINOPHEN 975 MG: 325 TABLET ORAL at 08:30

## 2024-03-08 RX ADMIN — SENNOSIDES AND DOCUSATE SODIUM 2 TABLET: 8.6; 5 TABLET ORAL at 17:31

## 2024-03-08 RX ADMIN — LABETALOL 20 MG/4 ML (5 MG/ML) INTRAVENOUS SYRINGE 20 MG: at 06:57

## 2024-03-08 RX ADMIN — HYDRALAZINE HYDROCHLORIDE 10 MG: 20 INJECTION INTRAMUSCULAR; INTRAVENOUS at 06:59

## 2024-03-08 RX ADMIN — MINOCYCLINE HYDROCHLORIDE 100 MG: 100 CAPSULE ORAL at 21:09

## 2024-03-08 NOTE — ANESTHESIA PREPROCEDURE EVALUATION
Procedure:  ELECTROCONVULSIVE THERAPY (ECT)    Relevant Problems   ANESTHESIA (within normal limits)      GI/HEPATIC   (+) GERD (gastroesophageal reflux disease)      Other   (+) Class 2 obesity in adult   (+) Schizoaffective disorder, bipolar type (HCC)        Physical Exam    Airway    Mallampati score: III  TM Distance: >3 FB  Neck ROM: full     Dental   No notable dental hx     Cardiovascular  Rhythm: regular, Rate: normal, Cardiovascular exam normal    Pulmonary  Pulmonary exam normal Breath sounds clear to auscultation    Other Findings        Anesthesia Plan  ASA Score- 3     Anesthesia Type- general with ASA Monitors.         Additional Monitors:     Airway Plan:            Plan Factors-    Chart reviewed.    Patient summary reviewed.    Patient is not a current smoker.              Induction- intravenous.    Postoperative Plan-     Informed Consent- Anesthetic plan and risks discussed with patient.  I personally reviewed this patient with the CRNA. Discussed and agreed on the Anesthesia Plan with the CRNA..

## 2024-03-08 NOTE — PROGRESS NOTES
"Progress Note - Behavioral Health   Alberto Berumen 28 y.o. male MRN: [id number]  Unit/Bed#: -02 Encounter: [id number]  Assessment/Plan   Principal Problem:    Schizoaffective disorder, bipolar type (HCC)  Active Problems:    GERD (gastroesophageal reflux disease)    Tobacco abuse    T wave inversion in EKG    Chronic idiopathic constipation    Vitamin B 12 deficiency    Vitamin D deficiency    Acanthosis nigricans    Rash    Class 2 obesity in adult      Subjective:  Patient was seen today for continuation of care, records reviewed and patient was discussed with the morning case review team.  Per staff, patient underwent ECT #5 this morning.  Reporting headache after ECT, received Tylenol at 0830 which was effective.  He is intermittently visible and social on the unit.      Alberto was seen today for psychiatric follow-up.  On assessment today, Alebrto was seen resting in bed today.  Alberto was drowsy during the interview today and required some prompting with questions.  He states ECT was \"a handful\" this morning.  He continues to report no improvements in AH with ECT treatments.  He states that he continues to hear voices that say \"they are going to kill me and my family\".  He does report some fatigue and drowsiness during the day.  He is agreeable to continued reduction of Remeron at HS.  He states he is sleeping all through the night and napping during the day.   He denies any chest pain today.     Alberto denies acute suicidal/self-harm ideation/intent/plan upon direct inquiry at this time.  Alberto remains behaviorally controlled, no agitation or aggression noted on exam or in report.  Alberto denies HI/VH, and does not appear overtly manic.  No overt delusions are verbalized.  Alberto remains adherent to his current psychotropic medication regimen and denies any side effects from medications, as well as none noted on exam.    Recommended Treatment: Treatment plan and medication changes discussed and " per the attending physician the plan is:    1.Continue with group therapy, milieu therapy and occupational therapy  2.Behavioral Health checks every 7 minutes  3.Continue frequent safety checks and vitals per unit protocol  4.Continue with SLIM medical management as indicated  5.Will reduce Remeron to 15mg PO at HS as patient continues with daytime sedation.  Continue ECT #6 Monday.  CK, CRP, troponin, BNP, ECG scheduled for Monday 3/11/24 as well at CBC for Clozaril therapy.  Patient Clozaril dose has been stable for 2 weeks now with no complaints of chest pain this week.    6.Will review labs in the a.m.  7.Disposition Planning: Discharge planning and efforts remain ongoing, potential for EAC referral    Vitals:  Vitals:    03/08/24 0915   BP: 112/68   Pulse: 94   Resp: 18   Temp: (!) 97.4 °F (36.3 °C)   SpO2: 98%       Laboratory Results:  I have personally reviewed all pertinent laboratory/tests results.  Most Recent Labs:   Lab Results   Component Value Date    WBC 8.59 03/04/2024    RBC 5.05 03/04/2024    HGB 11.9 (L) 03/04/2024    HCT 40.1 03/04/2024     03/04/2024    RDW 13.1 03/04/2024    NEUTROABS 4.62 03/04/2024    SODIUM 136 01/11/2024    K 4.1 01/11/2024     01/11/2024    CO2 27 01/11/2024    BUN 12 01/11/2024    CREATININE 0.90 01/11/2024    GLUC 97 01/11/2024    GLUF 97 01/11/2024    CALCIUM 9.4 01/11/2024    AST 23 01/11/2024    ALT 50 01/11/2024    ALKPHOS 76 01/11/2024    TP 7.1 01/11/2024    ALB 4.0 01/11/2024    TBILI 0.43 01/11/2024    CHOLESTEROL 161 01/11/2024    HDL 49 01/11/2024    TRIG 132 01/11/2024    LDLCALC 86 01/11/2024    NONHDLC 112 01/11/2024    LITHIUM 0.61 01/09/2024    [id number] 1.062 11/15/2023    HGBA1C 5.8 (H) 01/23/2024     01/23/2024       Psychiatric Review of Systems:  Behavior over the last 24 hours:  unchanged.   Sleep: increased  Appetite: adequate  Medication side effects: daytime sedation  ROS: no complaints, denies shortness of breath or chest  "pain and all other systems are negative for acute changes    Mental Status Evaluation:    Appearance:  disheveled, poor hygiene   Behavior:  calm, guarded, slow responses   Speech:  scant, delayed, soft   Mood:  dysphoric   Affect:  blunted   Thought Process:  concrete, poverty of thought   Associations: concrete associations   Thought Content:  some paranoia   Perceptual Disturbances: auditory hallucinations of \"voices\"   Risk Potential: Suicidal ideation - None  Homicidal ideation - None  Potential for aggression - No   Sensorium:  oriented to person, place, and situation   Memory:  recent memory intact   Consciousness:  alert and awake   Attention/Concentration: attention span and concentration appear shorter than expected for age   Insight:  limited   Judgment: limited   Gait/Station: in bed   Motor Activity: no abnormal movements     Progress Toward Goals:   Alberto is progressing towards goals of inpatient psychiatric treatment by continued medication compliance and is attending therapeutic modalities on the milieu. However, the patient continues to require inpatient psychiatric hospitalization for continued medication management and titration to optimize symptom reduction, improve sleep hygiene, and demonstrate adequate self-care.    Risk of Harm to Self:   Nursing Suicide Risk Assessment Last 24 hours: C-SSRS Risk (Since Last Contact)  Calculated C-SSRS Risk Score (Since Last Contact): No Risk Indicated  Current Specific Risk Factors include: mental illness diagnosis  Protective Factors: no current suicidal ideation, ability to communicate with staff on the unit, able to contract for safety on the unit, taking medications as ordered on the unit, responds to redirection  Based on today's assessment, Alberto presents the following risk of harm to self: low    Risk of Harm to Others:  Nursing Homicide Risk Assessment: Violence Risk to Others: Denies within past 6 months  Current Specific Risk Factors include: " none  Protective Factors: no current homicidal ideation  Based on today's assessment, Alberto presents the following risk of harm to others: low    The following interventions are recommended: behavioral checks every 7 minutes, continued hospitalization on locked unit        Behavioral Health Medications: all current active meds have been reviewed and planned medication changes: decrease Remeron to 15mg PO at HS .  Current Facility-Administered Medications   Medication Dose Route Frequency Provider Last Rate    acetaminophen  650 mg Oral Q6H PRN Yasmin Harvey MD      acetaminophen  650 mg Oral Q4H PRN Yasmin Harvey MD      acetaminophen  975 mg Oral Q6H PRN Yasmin Harvey MD      aluminum-magnesium hydroxide-simethicone  30 mL Oral Q4H PRN Yasmin Harvey MD      atropine  1 drop Sublingual HS HOLLI Anderson      atropine  1 drop Sublingual Daily PRN HOLLI Anderson      haloperidol lactate  2.5 mg Intramuscular Q4H PRN Max 4/day Yasmin Harvey MD      And    LORazepam  1 mg Intramuscular Q4H PRN Max 4/day Yasmin Harvey MD      And    benztropine  0.5 mg Intramuscular Q4H PRN Max 4/day Yasmin Harvey MD      haloperidol lactate  5 mg Intramuscular Q4H PRN Max 4/day Yasmin Harvey MD      And    LORazepam  2 mg Intramuscular Q4H PRN Max 4/day Yasmin Harvey MD      And    benztropine  1 mg Intramuscular Q4H PRN Max 4/day Yasmin Harvey MD      benztropine  1 mg Oral Q4H PRN Max 6/day Yasmin Harvey MD      bisacodyl  10 mg Rectal Daily PRN Yasmin Harvey MD      cloZAPine  450 mg Oral HS HOLLI Anderson      hydrOXYzine HCL  50 mg Oral Q6H PRN Max 4/day Yasmin Harvey MD      Or    diphenhydrAMINE  50 mg Intramuscular Q6H PRN Yasmin Harvey MD      diphenhydrAMINE-zinc acetate   Topical BID PRN Anjel Sagastume MD      escitalopram  20 mg Oral Daily La Flores MD      haloperidol  1 mg Oral Q6H PRN Yasmin Harvey MD      haloperidol  2.5 mg Oral Q4H PRN Max  4/day Yasmin Harvey MD      haloperidol  5 mg Oral Q4H PRN Max 4/day Yasmin Harvey MD      hydrocortisone   Topical 4x Daily PRN HOLLI Monge      hydrOXYzine HCL  100 mg Oral Q6H PRN Max 4/day Yasmin Harvey MD      Or    LORazepam  2 mg Intramuscular Q6H PRN Yasmin Harvey MD      hydrOXYzine HCL  25 mg Oral Q6H PRN Max 4/day Yasmin Harvey MD      lactated ringers  100 mL/hr Intravenous Continuous Elisa Workman MD 0 mL/hr (03/04/24 0713)    lactated ringers  100 mL/hr Intravenous Continuous Elisa Workman MD      melatonin  3 mg Oral HS Yasmin Harvey MD      metFORMIN  500 mg Oral Daily With Breakfast HOLLI Monge      methocarbamol  500 mg Oral Q6H PRN HOLLI Monge      minocycline  100 mg Oral Q12H KAYLIE HOLLI Monge      mirtazapine  15 mg Oral HS La Flores MD      nicotine polacrilex  4 mg Oral Q2H PRN Yasmin Harvey MD      ondansetron  4 mg Oral Q6H PRN HOLLI Galvan      polyethylene glycol  17 g Oral Daily PRN Yasmin Harvey MD      polyethylene glycol  17 g Oral Daily HOLLI Monge      propranolol  10 mg Oral Q12H [facility] HOLLI Anderson      senna-docusate sodium  1 tablet Oral Daily PRN Yasmin Harvey MD      senna-docusate sodium  2 tablet Oral BID La Flores MD      traZODone  50 mg Oral HS PRN Yasmin Harvey MD      white petrolatum-mineral oil   Topical TID PRN Anjel Sagastume MD         Risks / Benefits of Treatment:  Risks, benefits, and possible side effects of medications explained to patient and patient verbalizes understanding and agreement for treatment.    Counseling / Coordination of Care:  Patient's progress reviewed with nursing staff.  Medications, treatment progress and treatment plan reviewed with patient.  Supportive counseling provided to the patient.    Total floor/unit time spent today 25 minutes. Greater than 50% of total time was  spent with the patient and / or family counseling and / or coordination of care. A description of the counseling / coordination of care: medication education, treatment plan, supportive therapy.

## 2024-03-08 NOTE — PROCEDURES
Procedure Note - ECT  Alberto Berumne 28 y.o. male MRN: [id number]    Time out was taken with staff to confirm correct patient and correct procedure to be performed. This was the patient's fifth session of ECT. Prior to starting the procedure, the patient's questions and concerns were addressed. Patient denies improvement in mood/ symptoms. Patient reports transient shortness of breath and headache following last session of ECT. Patient agreed to continuing treatment. Stimulus dose was 45% . Patient had a satisfactory seizure. No immediate side effects were noted after the procedure.     This procedure was performed in the presence of and under the direct supervision of Dr. Amaya.    Session Number: 005    Diagnosis: Principal Problem:    Schizoaffective disorder, bipolar type ([facility])  Active Problems:    GERD (gastroesophageal reflux disease)    Tobacco abuse    T wave inversion in EKG    Chronic idiopathic constipation    Vitamin B 12 deficiency    Vitamin D deficiency    Acanthosis nigricans    Rash    Class 2 obesity in adult      ECT Type: Inpatient    Anesthesia: Methohexital    Electrode Placement: Bilateral    Energy level:  45 %      Seizure Duration     EE Sec.    EMG : 18 Sec (visual)    Post-ictal Suppression Index: 88.2 %    Results:Clinical seizure was satisfactory, Patient tolerated ECT well      Vitals:    24 0612   BP: 159/92   Pulse: 95   Resp: 14   Temp:    SpO2: 94%        Medication Administration - last 24 hours from 2024 0715 to 2024 0715         Date/Time Order Dose Route Action Action by     2024 EST nicotine polacrilex (NICORETTE) gum 4 mg 4 mg Oral Given Endy Frank RN     2024 1744 EST nicotine polacrilex (NICORETTE) gum 4 mg 4 mg Oral Given Amira Jefferson RN     2024 1522 EST nicotine polacrilex (NICORETTE) gum 4 mg 4 mg Oral Given Amira Jefferson RN     2024 1250 EST nicotine polacrilex (NICORETTE) gum 4 mg 4 mg Oral Given  Amira Jefferson, RN     03/07/2024 2118 EST melatonin tablet 3 mg 3 mg Oral Given [facility], RN     03/07/2024 2118 EST acetaminophen (TYLENOL) tablet 650 mg 650 mg Oral Given [facility], RN     03/07/2024 0836 EST polyethylene glycol (MIRALAX) packet 17 g 17 g Oral Not Given Amira Jefferson, RN     03/07/2024 0836 EST escitalopram (LEXAPRO) tablet 20 mg 20 mg Oral Given Amira Jefferson, RN     03/07/2024 0836 EST metFORMIN (GLUCOPHAGE) tablet 500 mg 500 mg Oral Given Amira Jefferson, RN     03/07/2024 2118 EST minocycline (MINOCIN) capsule 100 mg 100 mg Oral Given Endy Monika, RN     03/07/2024 0836 EST minocycline (MINOCIN) capsule 100 mg 100 mg Oral Given Amira Jefferson, RN     03/07/2024 2118 EST propranolol (INDERAL) tablet 10 mg 10 mg Oral Given Endy Monika, RN     03/07/2024 0836 EST propranolol (INDERAL) tablet 10 mg 10 mg Oral Given Amira Jefferson, RN     03/07/2024 2118 EST atropine (ISOPTO ATROPINE) 1 % ophthalmic solution 1 drop 1 drop Sublingual Given Endy Monika, RN     03/07/2024 1723 EST senna-docusate sodium (SENOKOT S) 8.6-50 mg per tablet 2 tablet 2 tablet Oral Given Amira Jefferson, RN     03/07/2024 0836 EST senna-docusate sodium (SENOKOT S) 8.6-50 mg per tablet 2 tablet 2 tablet Oral Given Amira Jefferson, RN     03/07/2024 2118 EST cloZAPine (CLOZARIL) tablet 450 mg 450 mg Oral Given Endy Ellenbrojaxson, RN     03/07/2024 2118 EST mirtazapine (REMERON) tablet 30 mg 30 mg Oral Given Endy Ellenbrojaxson, MIGUEL Light DO 03/08/24  Psychiatry Resident, PGY- I

## 2024-03-08 NOTE — NURSING NOTE
"Pt attempted to rest and give last PRN Ibuprofen extra time, but is still having a headache. Pt rates headache at 4/10. PRN Acetaminophen 650mg PO administered at 1617.     1717, Pt states \"it feels better\" . No s/s further discomfort.   "

## 2024-03-08 NOTE — NURSING NOTE
Pt is calm and cooperative. Visible and social. Reports ongoing AH and depression. Denies SI, HI, and VH. Verbalizes readiness for pending ECT procedure in the morning. Compliant with medication and snack. Tylenol 650mg given for 5/10 generalized pain at 2118 upon request. Denies further unmet needs/ concerns at this time.

## 2024-03-08 NOTE — NURSING NOTE
Vitals WLD. NPO maintained after midnight. Pt off unit to PACU for ECT procedure. Denies any complaints/ concerns.

## 2024-03-08 NOTE — ANESTHESIA PREPROCEDURE EVALUATION
Procedure:  ELECTROCONVULSIVE THERAPY (ECT)    Relevant Problems   ANESTHESIA (within normal limits)      GI/HEPATIC   (+) GERD (gastroesophageal reflux disease)      Other   (+) Class 2 obesity in adult   (+) Medical clearance for psychiatric admission   (+) Schizoaffective disorder, bipolar type (HCC)        Physical Exam    Airway    Mallampati score: III  TM Distance: <3 FB  Neck ROM: full     Dental       Cardiovascular  Cardiovascular exam normal    Pulmonary  Pulmonary exam normal     Other Findings        Anesthesia Plan  ASA Score- 3     Anesthesia Type- general with ASA Monitors.         Additional Monitors:     Airway Plan:            Plan Factors-    Chart reviewed. EKG reviewed.  Existing labs reviewed.     Patient is not a current smoker.              Induction- intravenous.    Postoperative Plan-     Informed Consent- Anesthetic plan and risks discussed with patient.  I personally reviewed this patient with the CRNA. Discussed and agreed on the Anesthesia Plan with the CRNA..

## 2024-03-08 NOTE — NURSING NOTE
Pt returned to unit from ECT via wheelchair, accompanied by staff at 0745. Pt alert and oriented .Pt shows no sign of dysphagia. Pt reports headache. PRN Acetaminophen 975mg PO administered at 0830. VS all WNL while on unit. Pt now resting in bed.

## 2024-03-08 NOTE — PROGRESS NOTES
03/08/24 1606   Team Meeting   Meeting Type Tx Team Meeting   Team Members Present   Team Members Present Physician;Nurse;   Physician Team Member Migel   Nursing Team Member Giancarlo   Care Management Team Member Miguelito   Patient/Family Present   Patient Present No   Patient's Family Present No     Patient currently holds 201 status. Patient is currently obtaining ECT. Patient continue to report AH but denies all other symptoms. Patient to continue on all current scheduled medications. Patient discharge date and time is to be determined.

## 2024-03-08 NOTE — NURSING NOTE
Pt approached nursing reporting 6/10 headache that has been lingering since ECT this morning. Pt had PRN Acetaminophen that pt states was ineffective. Medical made aware. New order for PRN Ibuprofen 600mg. PRN Ibuprofen 600mg PO administered at 1423. Pt resting in bed. Will continue to monitor for effectiveness.     1523, as per pt PRN Ibuprofen 600mg was only mildly effective.

## 2024-03-08 NOTE — NURSING NOTE
Pt reports 7/10 headache after this morning's ECT treatment. PRN Acetaminophen 975mg PO administered at 0830.     0930, pt sleeping and snoring in bed when re-evaluated. No s/s further discomfort.

## 2024-03-08 NOTE — NURSING NOTE
Pt observed in dayroom for meals and group. Pt sleeping intermittently in room. Pt is social with peers but appears less talkative than previous. He is pleasant and cooperative with staff. Pt is wearing his own clothing. Medication and meal compliant. Pt received PRN's x 2 for headache that was reported after ECT this morning. Pt denies SI/HI/VH, but does endorse that he is still having AH. Pt appears sad and more withdrawn. Continual observation maintained. No unmet needs this shift.

## 2024-03-08 NOTE — PLAN OF CARE
Problem: ANXIETY  Goal: Will report anxiety at manageable levels  Description: INTERVENTIONS:  - Administer medication as ordered  - Teach and encourage coping skills  - Provide emotional support  - Assess patient/family for anxiety and ability to cope  Outcome: Progressing     Problem: Depression  Goal: Treatment Goal: Demonstrate behavioral control of depressive symptoms, verbalize feelings of improved mood/affect, and adopt new coping skills prior to discharge  Outcome: Progressing  Goal: Verbalize thoughts and feelings  Description: Interventions:  - Assess and re-assess patient's level of risk   - Engage patient in 1:1 interactions, daily, for a minimum of 15 minutes   - Encourage patient to express feelings, fears, frustrations, hopes   Outcome: Progressing  Goal: Refrain from isolation  Description: Interventions:  - Develop a trusting relationship   - Encourage socialization   Outcome: Progressing  Goal: Refrain from self-neglect  Outcome: Progressing     Problem: PSYCHOSIS  Goal: Will report no hallucinations or delusions  Description: Interventions:  - Administer medication as  ordered  - Every waking shifts and PRN assess for the presence of hallucinations and or delusions  - Assist with reality testing to support increasing orientation  - Assess if patient's hallucinations or delusions are encouraging self-harm or harm to others and intervene as appropriate  Outcome: Not Progressing

## 2024-03-08 NOTE — PROGRESS NOTES
03/08/24 0745   Team Meeting   Meeting Type Daily Rounds   Team Members Present   Team Members Present Physician;Nurse;   Physician Team Member Mark   Nursing Team Member Giancarlo   Care Management Team Member Miguelito   Patient/Family Present   Patient Present No   Patient's Family Present No     Patient currently holds a 201 status. Patient reports continued AH but denies all other symptoms. Patient to continue on all current scheduled medications. Patient discharge date and time is to be determined pending EAC placement.

## 2024-03-09 PROCEDURE — 99232 SBSQ HOSP IP/OBS MODERATE 35: CPT | Performed by: STUDENT IN AN ORGANIZED HEALTH CARE EDUCATION/TRAINING PROGRAM

## 2024-03-09 RX ADMIN — NICOTINE POLACRILEX 4 MG: 4 GUM, CHEWING ORAL at 19:52

## 2024-03-09 RX ADMIN — POLYETHYLENE GLYCOL 3350 17 G: 17 POWDER, FOR SOLUTION ORAL at 10:55

## 2024-03-09 RX ADMIN — ATROPINE SULFATE 1 DROP: 10 SOLUTION/ DROPS OPHTHALMIC at 21:28

## 2024-03-09 RX ADMIN — NICOTINE POLACRILEX 4 MG: 4 GUM, CHEWING ORAL at 12:59

## 2024-03-09 RX ADMIN — MELATONIN TAB 3 MG 3 MG: 3 TAB at 21:29

## 2024-03-09 RX ADMIN — METFORMIN HYDROCHLORIDE 500 MG: 500 TABLET ORAL at 10:55

## 2024-03-09 RX ADMIN — SENNOSIDES AND DOCUSATE SODIUM 2 TABLET: 8.6; 5 TABLET ORAL at 10:55

## 2024-03-09 RX ADMIN — SENNOSIDES AND DOCUSATE SODIUM 2 TABLET: 8.6; 5 TABLET ORAL at 17:44

## 2024-03-09 RX ADMIN — NICOTINE POLACRILEX 4 MG: 4 GUM, CHEWING ORAL at 10:56

## 2024-03-09 RX ADMIN — NICOTINE POLACRILEX 4 MG: 4 GUM, CHEWING ORAL at 15:31

## 2024-03-09 RX ADMIN — MINOCYCLINE HYDROCHLORIDE 100 MG: 100 CAPSULE ORAL at 10:55

## 2024-03-09 RX ADMIN — NICOTINE POLACRILEX 4 MG: 4 GUM, CHEWING ORAL at 17:44

## 2024-03-09 RX ADMIN — PROPRANOLOL HYDROCHLORIDE 10 MG: 10 TABLET ORAL at 21:29

## 2024-03-09 RX ADMIN — PROPRANOLOL HYDROCHLORIDE 10 MG: 10 TABLET ORAL at 10:55

## 2024-03-09 RX ADMIN — CLOZAPINE 450 MG: 25 TABLET ORAL at 21:28

## 2024-03-09 RX ADMIN — MINOCYCLINE HYDROCHLORIDE 100 MG: 100 CAPSULE ORAL at 21:29

## 2024-03-09 RX ADMIN — MIRTAZAPINE 15 MG: 15 TABLET, FILM COATED ORAL at 21:29

## 2024-03-09 RX ADMIN — ESCITALOPRAM OXALATE 20 MG: 10 TABLET ORAL at 10:54

## 2024-03-09 NOTE — PROGRESS NOTES
"Progress Note - Behavioral Health   Alberto Berumen 28 y.o. male MRN: [id number]  Unit/Bed#: [facility] 383-02 Encounter: [id number]    All documentation, nursing notes, labs, and vitals reviewed.  The patient's medication reconciliation chart was also analyzed for medication adherence.  I personally evaluated Alberto Berumen and discussed current care with treatment team. No acute events overnight.    Alberto was resting peacefully upon approach. He describes his overall state of MH as \"about the same\". He denies new onset medication side effects. He is tolerating ECT thus far but denies robust improvement regarding mood and control of psychotic symptomatology. He continues to endorse AH and paranoia. He denies VH or ideas of reference at the moment. His sleep is excessive. He is malodorous and not adhering to appropriate hygiene/grooming. Alberto reports low mood, anergia, amotivation, and feelings of apathy. He cannot engage in discussions regarding future goals. Fortunately, he denies SI/HI. During today's examination, Alberto does not exhibit objective evidence of aimee/hypomania. Alberto is not currently irritable, grandiose, labile, or pathologically euphoric. Alberto offers no further concerns.     Mental Status Evaluation:    Appearance:  disheveled, marginal hygiene, overweight, malodorous    Behavior:  pleasant, calm, guarded, poor eye contact   Speech:  slow, scant, soft   Mood:  \"The same\"   Affect:  flat   Thought Process:  concrete, poverty of thought   Associations: concrete associations   Thought Content:  persecutory delusions, paranoid thoughts   Perceptual Disturbances: no visual hallucinations, auditory hallucinations   Risk Potential: Suicidal ideation - None at present  Homicidal ideation - None at present  Potential for aggression - No   Sensorium:  oriented to person and place   Memory:  recent and remote memory: unable to assess due to lack of cooperation   Consciousness:  alert and awake  "   Attention: attention span and concentration appear shorter than expected for age   Insight:  impaired   Judgment: poor   Gait/Station: in bed   Motor Activity: no abnormal movements       Assessment:     Principal Problem:    Schizoaffective disorder, bipolar type ([facility])  Active Problems:    GERD (gastroesophageal reflux disease)    Tobacco abuse    T wave inversion in EKG    Chronic idiopathic constipation    Vitamin B 12 deficiency    Vitamin D deficiency    Acanthosis nigricans    Rash    Class 2 obesity in adult      Plan/Recommended Treatment:     - Continue with pharmacotherapy, group therapy, milieu therapy and occupational therapy.    - Risks/benefits/alternatives to treatment discussed and Alberto Berumen continues to verbalize understanding   - No psychopharmacologic changes necessary at this juncture, continue scheduled psychotropic agents at current doses (see below)   - Will consider further optimization of psychotropic medication regimen as hospital course progresses   - Continue to assess for adverse medication side effects.  - Medical management as per SLIM recs  - Encourage Alberto Berumen to participate in nonverbal forms of therapy including journaling and art/music therapy  - Continue precautionary Q7-minute safety checks.  - Continue to engage case management/SW to assist with collateral information, discharge planning, and the implementation of an individualized, patient-centered plan of care.  - The patient will be maintained on the following medications:    Current Facility-Administered Medications   Medication Dose Route Frequency Provider Last Rate    acetaminophen  650 mg Oral Q6H PRN Yasmin Harvey MD      acetaminophen  650 mg Oral Q4H PRN Yasmin Harvey MD      acetaminophen  975 mg Oral Q6H PRN Yasmin Harvey MD      aluminum-magnesium hydroxide-simethicone  30 mL Oral Q4H PRN Yasmin Harvey MD      atropine  1 drop Sublingual HS HOLLI Anderson      atropine  1 drop  Sublingual Daily PRN Prisca Villafuerte, STEVENP      haloperidol lactate  2.5 mg Intramuscular Q4H PRN Max 4/day Yasmin Harvey MD      And    LORazepam  1 mg Intramuscular Q4H PRN Max 4/day Yasmin Harvey MD      And    benztropine  0.5 mg Intramuscular Q4H PRN Max 4/day Yasmin Harvey MD      haloperidol lactate  5 mg Intramuscular Q4H PRN Max 4/day Yasmin Harvey MD      And    LORazepam  2 mg Intramuscular Q4H PRN Max 4/day Yasmin Harvey MD      And    benztropine  1 mg Intramuscular Q4H PRN Max 4/day Yasmin Harvey MD      benztropine  1 mg Oral Q4H PRN Max 6/day Yasmin Harvey MD      bisacodyl  10 mg Rectal Daily PRN Yasmin Harvey MD      cloZAPine  450 mg Oral HS Prisca Villafuerte, HOLLI      hydrOXYzine HCL  50 mg Oral Q6H PRN Max 4/day Yasmin Harvey MD      Or    diphenhydrAMINE  50 mg Intramuscular Q6H PRN Yasmin Harvey MD      diphenhydrAMINE-zinc acetate   Topical BID PRN Anjel Sagastume MD      escitalopram  20 mg Oral Daily La Flores MD      haloperidol  1 mg Oral Q6H PRN Yasmin Harvey MD      haloperidol  2.5 mg Oral Q4H PRN Max 4/day Yasmin Harvey MD      haloperidol  5 mg Oral Q4H PRN Max 4/day Yasmin Harvey MD      hydrocortisone   Topical 4x Daily PRN HOLLI Monge      hydrOXYzine HCL  100 mg Oral Q6H PRN Max 4/day Yasmin Harvey MD      Or    LORazepam  2 mg Intramuscular Q6H PRN Yasmin Harvey MD      hydrOXYzine HCL  25 mg Oral Q6H PRN Max 4/day Yasmin Harvey MD      ibuprofen  600 mg Oral Q8H PRN HOLLI Monge      lactated ringers  100 mL/hr Intravenous Continuous Elisa Workman MD 0 mL/hr (03/04/24 0713)    lactated ringers  100 mL/hr Intravenous Continuous Elisa Workman MD      melatonin  3 mg Oral HS Yasmin Harvey MD      metFORMIN  500 mg Oral Daily With Breakfast HOLLI Monge      methocarbamol  500 mg Oral Q6H PRN HOLLI Monge      minocycline  100 mg Oral Q12H  [facility] HOLLI Monge      mirtazapine  15 mg Oral HS La Flores MD      nicotine polacrilex  4 mg Oral Q2H PRN Yasmin Harvey MD      ondansetron  4 mg Oral Q6H PRN HOLLI Galvan      polyethylene glycol  17 g Oral Daily PRN Yasmin Harvey MD      polyethylene glycol  17 g Oral Daily HOLLI Monge      propranolol  10 mg Oral Q12H [facility] HOLLI Anderson      senna-docusate sodium  1 tablet Oral Daily PRN Yasmin Harvey MD      senna-docusate sodium  2 tablet Oral BID La Flores MD      traZODone  50 mg Oral HS PRN Yasmin Harvey MD      white petrolatum-mineral oil   Topical TID PRN Anjel Sagastume MD

## 2024-03-09 NOTE — NURSING NOTE
"Alberto is calm with a blunted affect and a depressed mood denying Si/HI/VH. Reports ongoing AH telling him he and his family are going to die. He says the voices are worse at night. He says he is fearful of leaving the hospital as he says \"I'm afraid of the paranoia from the voices.\" He slept until 1100 and is now out in the dayroom playing cards with peers. No unmet needs at this time.   "

## 2024-03-09 NOTE — NURSING NOTE
Pt is calm and cooperative. Pt observed resting in bed most of evening shift, visible for needs. Denies SI, HI, VH, and pain. Reports ongoing manageable AH. Compliant with medication and snack. Denies unmet needs/ concerns at this time.

## 2024-03-09 NOTE — PLAN OF CARE
Problem: PSYCHOSIS  Goal: Will report no hallucinations or delusions  Description: Interventions:  - Administer medication as  ordered  - Every waking shifts and PRN assess for the presence of hallucinations and or delusions  - Assist with reality testing to support increasing orientation  - Assess if patient's hallucinations or delusions are encouraging self-harm or harm to others and intervene as appropriate  Outcome: Not Progressing     Problem: ANXIETY  Goal: Will report anxiety at manageable levels  Description: INTERVENTIONS:  - Administer medication as ordered  - Teach and encourage coping skills  - Provide emotional support  - Assess patient/family for anxiety and ability to cope  Outcome: Progressing     Problem: Ineffective Coping  Goal: Participates in unit activities  Description: Interventions:  - Provide therapeutic environment   - Provide required programming   - Redirect inappropriate behaviors   Outcome: Progressing     Problem: Depression  Goal: Treatment Goal: Demonstrate behavioral control of depressive symptoms, verbalize feelings of improved mood/affect, and adopt new coping skills prior to discharge  Outcome: Progressing  Goal: Verbalize thoughts and feelings  Description: Interventions:  - Assess and re-assess patient's level of risk   - Engage patient in 1:1 interactions, daily, for a minimum of 15 minutes   - Encourage patient to express feelings, fears, frustrations, hopes   Outcome: Progressing  Goal: Refrain from isolation  Description: Interventions:  - Develop a trusting relationship   - Encourage socialization   Outcome: Progressing  Goal: Refrain from self-neglect  Outcome: Progressing     Problem: Electroconvulsive therapy (ECT)  Goal: Treatment Goal: Demonstrate a reduction of confusion and improved orientation to person, place, time and/or situation upon discharge, according to optimum baseline/ability  Outcome: Progressing  Goal: Verbalizes/displays adequate comfort level or  baseline comfort level  Description: Interventions:  - Encourage patient to monitor pain and request assistance  - Assess pain using appropriate pain scale  - Administer analgesics based on type and severity of pain and evaluate response  - Implement non-pharmacological measures as appropriate and evaluate response  - Consider cultural and social influences on pain and pain management  - Notify physician/advanced practitioner if interventions unsuccessful or patient reports new pain  Outcome: Progressing  Goal: Achieves stable or improved neurological status  Description: INTERVENTIONS  - Monitor and report changes in neurological status  - Monitor vital signs such as temperature, blood pressure, glucose, and any other labs ordered   - Initiate measures to prevent increased intracranial pressure  - Monitor for seizure activity and implement precautions if appropriate      Outcome: Progressing  Goal: Achieves maximal functionality and self care  Description: INTERVENTIONS  - Monitor swallowing and airway patency with patient fatigue and changes in neurological status  - Encourage and assist patient to increase activity and self care.   - Encourage visually impaired, hearing impaired and aphasic patients to use assistive/communication devices  Outcome: Progressing  Goal: Maintain or return mobility to safest level of function  Description: INTERVENTIONS:  - Assess patient's ability to carry out ADLs; assess patient's baseline for ADL function and identify physical deficits which impact ability to perform ADLs (bathing, care of mouth/teeth, toileting, grooming, dressing, etc.)  - Assess/evaluate cause of self-care deficits   - Assess range of motion  - Assess patient's mobility  - Assess patient's need for assistive devices and provide as appropriate  - Encourage maximum independence but intervene and supervise when necessary  - Involve family in performance of ADLs  - Assess for home care needs following discharge   -  Consider OT consult to assist with ADL evaluation and planning for discharge  - Provide patient education as appropriate  Outcome: Progressing  Goal: Absence of urinary retention  Description: INTERVENTIONS:  - Assess patient’s ability to void and empty bladder  - Monitor I/O  - Bladder scan as needed  - Discuss with physician/AP medications to alleviate retention as needed  - Discuss catheterization for long term situations as appropriate  Outcome: Progressing  Goal: Minimal or absence of nausea and/or vomiting  Description: INTERVENTIONS:  - Administer IV fluids if ordered to ensure adequate hydration  - Maintain NPO status until nausea and vomiting are resolved  - Nasogastric tube if ordered  - Administer ordered antiemetic medications as needed  - Provide nonpharmacologic comfort measures as appropriate  - Advance diet as tolerated, if ordered  - Consider nutrition services referral to assist patient with adequate nutrition and appropriate food choices  Outcome: Progressing  Goal: Maintains adequate nutritional intake  Description: INTERVENTIONS:  - Monitor percentage of each meal consumed  - Identify factors contributing to decreased intake, treat as appropriate  - Assist with meals as needed  - Monitor I&O, weight, and lab values if indicated  - Obtain nutrition services referral as needed  Outcome: Progressing

## 2024-03-10 PROCEDURE — 99232 SBSQ HOSP IP/OBS MODERATE 35: CPT | Performed by: STUDENT IN AN ORGANIZED HEALTH CARE EDUCATION/TRAINING PROGRAM

## 2024-03-10 RX ORDER — LIDOCAINE HYDROCHLORIDE 10 MG/ML
0.5 INJECTION, SOLUTION EPIDURAL; INFILTRATION; INTRACAUDAL; PERINEURAL ONCE AS NEEDED
Status: CANCELLED | OUTPATIENT
Start: 2024-03-10

## 2024-03-10 RX ORDER — SODIUM CHLORIDE, SODIUM LACTATE, POTASSIUM CHLORIDE, CALCIUM CHLORIDE 600; 310; 30; 20 MG/100ML; MG/100ML; MG/100ML; MG/100ML
125 INJECTION, SOLUTION INTRAVENOUS CONTINUOUS
Status: CANCELLED | OUTPATIENT
Start: 2024-03-10

## 2024-03-10 RX ADMIN — NICOTINE POLACRILEX 4 MG: 4 GUM, CHEWING ORAL at 21:50

## 2024-03-10 RX ADMIN — CLOZAPINE 450 MG: 25 TABLET ORAL at 21:12

## 2024-03-10 RX ADMIN — NICOTINE POLACRILEX 4 MG: 4 GUM, CHEWING ORAL at 19:49

## 2024-03-10 RX ADMIN — MELATONIN TAB 3 MG 3 MG: 3 TAB at 21:12

## 2024-03-10 RX ADMIN — SENNOSIDES AND DOCUSATE SODIUM 2 TABLET: 8.6; 5 TABLET ORAL at 17:46

## 2024-03-10 RX ADMIN — ATROPINE SULFATE 1 DROP: 10 SOLUTION/ DROPS OPHTHALMIC at 21:17

## 2024-03-10 RX ADMIN — METFORMIN HYDROCHLORIDE 500 MG: 500 TABLET ORAL at 10:16

## 2024-03-10 RX ADMIN — SENNOSIDES AND DOCUSATE SODIUM 2 TABLET: 8.6; 5 TABLET ORAL at 10:16

## 2024-03-10 RX ADMIN — NICOTINE POLACRILEX 4 MG: 4 GUM, CHEWING ORAL at 17:46

## 2024-03-10 RX ADMIN — NICOTINE POLACRILEX 4 MG: 4 GUM, CHEWING ORAL at 12:50

## 2024-03-10 RX ADMIN — MINOCYCLINE HYDROCHLORIDE 100 MG: 100 CAPSULE ORAL at 21:12

## 2024-03-10 RX ADMIN — POLYETHYLENE GLYCOL 3350 17 G: 17 POWDER, FOR SOLUTION ORAL at 10:17

## 2024-03-10 RX ADMIN — MINOCYCLINE HYDROCHLORIDE 100 MG: 100 CAPSULE ORAL at 10:16

## 2024-03-10 RX ADMIN — PROPRANOLOL HYDROCHLORIDE 10 MG: 10 TABLET ORAL at 21:12

## 2024-03-10 RX ADMIN — ESCITALOPRAM OXALATE 20 MG: 10 TABLET ORAL at 10:16

## 2024-03-10 RX ADMIN — NICOTINE POLACRILEX 4 MG: 4 GUM, CHEWING ORAL at 10:20

## 2024-03-10 NOTE — PROGRESS NOTES
"Progress Note - Behavioral Health   Alberto Berumen 28 y.o. male MRN: [id number]  Unit/Bed#: [facility] 383-02 Encounter: [id number]    All documentation, nursing notes, labs, and vitals reviewed.  The patient's medication reconciliation chart was also analyzed for medication adherence.  I personally evaluated Alberto Berumen and discussed current care with treatment team. No acute events overnight.     On examination, Alberto is resting peacefully. He denies psychiatric changes over the last 24 hours. He remains isolative and withdrawn to room. He continues to endorse hypersomnia, anergia, and amotivation. His appetite is fair. He denies SI/HI today. When questioned about optimism regarding his future and current goals, he does not engage. Alberto is not tense or on-edge at the moment. No panic attacks over the last 24 hours. During today's examination, Alberto does not exhibit objective evidence of aimee/hypomania. Alberto is not currently irritable, grandiose, impulsive, or expansive in mood. Alberto continues to endorse AH, paranoia, and delusional beliefs (death of family members). Alberto voices understanding that he is set for ECT #6 tomorrow. Alberto offers no further concerns.     Mental Status Evaluation:    Appearance:  disheveled, marginal hygiene, looks older than stated age, overweight   Behavior:  cooperative, no eye contact   Speech:  slow, scant, soft   Mood:  \"OK\"   Affect:  flat   Thought Process:  concrete, poverty of thought   Associations: concrete associations   Thought Content:  persecutory delusions, paranoid thoughts   Perceptual Disturbances: no visual hallucinations, auditory hallucinations   Risk Potential: Suicidal ideation - None at present  Homicidal ideation - None at present  Potential for aggression - No   Sensorium:  unable to assess, does not answer   Memory:  recent and remote memory: unable to assess due to lack of cooperation   Consciousness:  alert and awake    Attention: attention " span and concentration appear shorter than expected for age   Insight:  poor   Judgment: poor   Gait/Station: in bed   Motor Activity: no abnormal movements       Assessment:     Principal Problem:    Schizoaffective disorder, bipolar type (HCC)  Active Problems:    GERD (gastroesophageal reflux disease)    Tobacco abuse    T wave inversion in EKG    Chronic idiopathic constipation    Vitamin B 12 deficiency    Vitamin D deficiency    Acanthosis nigricans    Rash    Class 2 obesity in adult      Plan/Recommended Treatment:     - Continue with pharmacotherapy, group therapy, milieu therapy and occupational therapy.    - Risks/benefits/alternatives to treatment discussed and Alberto Berumen continues to verbalize understanding    - ECT #6 tomorrow  - No psychopharmacologic changes necessary at this juncture, continue scheduled psychotropic agents at current doses (see below)   - Will consider further optimization of psychotropic medication regimen as hospital course progresses   - Continue to assess for adverse medication side effects.  - Medical management as per SLIM recs  - Encourage Alberto Berumen to participate in nonverbal forms of therapy including journaling and art/music therapy  - Continue precautionary Q7-minute safety checks.  - Continue to engage case management/SW to assist with collateral information, discharge planning, and the implementation of an individualized, patient-centered plan of care.  - The patient will be maintained on the following medications:    Current Facility-Administered Medications   Medication Dose Route Frequency Provider Last Rate    acetaminophen  650 mg Oral Q6H PRN Yasmin Harvey MD      acetaminophen  650 mg Oral Q4H PRN Yasmin Harvey MD      acetaminophen  975 mg Oral Q6H PRN Yasmin Harvey MD      aluminum-magnesium hydroxide-simethicone  30 mL Oral Q4H PRN Yasmin Harvey MD      atropine  1 drop Sublingual HS HOLLI Anderson      atropine  1 drop  Sublingual Daily PRN Prisca Villafuerte, STEVENP      haloperidol lactate  2.5 mg Intramuscular Q4H PRN Max 4/day Yasmin Harvey MD      And    LORazepam  1 mg Intramuscular Q4H PRN Max 4/day Yasmin Harvey MD      And    benztropine  0.5 mg Intramuscular Q4H PRN Max 4/day Yasmin Harvey MD      haloperidol lactate  5 mg Intramuscular Q4H PRN Max 4/day Yasmin Harvey MD      And    LORazepam  2 mg Intramuscular Q4H PRN Max 4/day Yasmin Harvey MD      And    benztropine  1 mg Intramuscular Q4H PRN Max 4/day Yasmin Harvey MD      benztropine  1 mg Oral Q4H PRN Max 6/day Yasmin Harvey MD      bisacodyl  10 mg Rectal Daily PRN Yasmin Harvey MD      cloZAPine  450 mg Oral HS Prisca Vlilafuerte, HOLLI      hydrOXYzine HCL  50 mg Oral Q6H PRN Max 4/day Yasmin Harvey MD      Or    diphenhydrAMINE  50 mg Intramuscular Q6H PRN Yasmin Harvey MD      diphenhydrAMINE-zinc acetate   Topical BID PRN Anjel Sagastume MD      escitalopram  20 mg Oral Daily La Flores MD      haloperidol  1 mg Oral Q6H PRN Yasmin Harvey MD      haloperidol  2.5 mg Oral Q4H PRN Max 4/day Yasmin Harvey MD      haloperidol  5 mg Oral Q4H PRN Max 4/day Yasmin Harvey MD      hydrocortisone   Topical 4x Daily PRN HOLLI Monge      hydrOXYzine HCL  100 mg Oral Q6H PRN Max 4/day Yasmin Harvey MD      Or    LORazepam  2 mg Intramuscular Q6H PRN Yasmin Harvey MD      hydrOXYzine HCL  25 mg Oral Q6H PRN Max 4/day Yasmin Harvey MD      ibuprofen  600 mg Oral Q8H PRN HOLLI Monge      lactated ringers  100 mL/hr Intravenous Continuous Elisa Workman MD 0 mL/hr (03/04/24 0713)    lactated ringers  100 mL/hr Intravenous Continuous Elisa Workman MD      melatonin  3 mg Oral HS Yasmin Harvey MD      metFORMIN  500 mg Oral Daily With Breakfast HOLLI Monge      methocarbamol  500 mg Oral Q6H PRN HOLLI Monge      minocycline  100 mg Oral Q12H  [facility] Eileen Jensen, HOLLI      nicotine polacrilex  4 mg Oral Q2H PRN Yasmin Harvey MD      ondansetron  4 mg Oral Q6H PRN HOLLI Galvan      polyethylene glycol  17 g Oral Daily PRN Yasmin Harvey MD      polyethylene glycol  17 g Oral Daily Eileen Jensen, HOLLI      propranolol  10 mg Oral Q12H [facility] HOLLI Anderson      senna-docusate sodium  1 tablet Oral Daily PRN Yasmin Harvey MD      senna-docusate sodium  2 tablet Oral BID La Flores MD      traZODone  50 mg Oral HS PRN Yasmin Harvey MD      white petrolatum-mineral oil   Topical TID PRN Anjel Sagastume MD

## 2024-03-10 NOTE — NURSING NOTE
"Patient was visible and social with peers this evening. Reports auditory hallucinations that \"they are going to kill me and my family\". Reassurance provided. Patient instructed to come to staff if medications do not help. Denies depression, anxiety, SI, and HI.    Medication compliant. Able to make needs known throughout the evening and denies any unmet needs or concerns at this time.   "

## 2024-03-10 NOTE — NURSING NOTE
"Alberto is calm with depressed mood and blunted affect sleeping until 1015.  He denies anxiety or SI/HI/VH. AH telling him that he and his family are going to die remain unchanged. He says he is paranoid from the voices but not paranoid against any person specifically. He suspects, there might be a government agency or something like that behind the voices.\" He is out socializing with peers during lunch. No unmet needs at this time.    "

## 2024-03-10 NOTE — NURSING NOTE
Alberto is calm with a less blunted affect during the evening occasionally smiling. He is more sociable playing cards with peers. He reports that the AH are unchanged and continue to tell him he and his family are going to die. He denies depression, anxiety, SI/HI/VH. Nicotine gum given as needed throughout the day. Reports BM today without issue.

## 2024-03-11 ENCOUNTER — ANESTHESIA EVENT (INPATIENT)
Dept: PREOP/PACU | Facility: HOSPITAL | Age: 29
End: 2024-03-11
Payer: COMMERCIAL

## 2024-03-11 ENCOUNTER — ANESTHESIA (INPATIENT)
Dept: PREOP/PACU | Facility: HOSPITAL | Age: 29
End: 2024-03-11
Payer: COMMERCIAL

## 2024-03-11 ENCOUNTER — APPOINTMENT (INPATIENT)
Dept: PREOP/PACU | Facility: HOSPITAL | Age: 29
DRG: 750 | End: 2024-03-11
Attending: STUDENT IN AN ORGANIZED HEALTH CARE EDUCATION/TRAINING PROGRAM
Payer: COMMERCIAL

## 2024-03-11 LAB
ATRIAL RATE: 101 BPM
ATRIAL RATE: 102 BPM
BASOPHILS # BLD AUTO: 0.06 THOUSANDS/ÂΜL (ref 0–0.1)
BASOPHILS NFR BLD AUTO: 1 % (ref 0–1)
BNP SERPL-MCNC: 9 PG/ML (ref 0–100)
CARDIAC TROPONIN I PNL SERPL HS: <2 NG/L (ref 8–18)
CK SERPL-CCNC: 168 U/L (ref 39–308)
CRP SERPL QL: 11.6 MG/L
EOSINOPHIL # BLD AUTO: 0.28 THOUSAND/ÂΜL (ref 0–0.61)
EOSINOPHIL NFR BLD AUTO: 3 % (ref 0–6)
ERYTHROCYTE [DISTWIDTH] IN BLOOD BY AUTOMATED COUNT: 13.1 % (ref 11.6–15.1)
HCT VFR BLD AUTO: 43.3 % (ref 36.5–49.3)
HGB BLD-MCNC: 13.1 G/DL (ref 12–17)
IMM GRANULOCYTES # BLD AUTO: 0.04 THOUSAND/UL (ref 0–0.2)
IMM GRANULOCYTES NFR BLD AUTO: 1 % (ref 0–2)
LYMPHOCYTES # BLD AUTO: 3.07 THOUSANDS/ÂΜL (ref 0.6–4.47)
LYMPHOCYTES NFR BLD AUTO: 35 % (ref 14–44)
MCH RBC QN AUTO: 24.2 PG (ref 26.8–34.3)
MCHC RBC AUTO-ENTMCNC: 30.3 G/DL (ref 31.4–37.4)
MCV RBC AUTO: 80 FL (ref 82–98)
MONOCYTES # BLD AUTO: 0.83 THOUSAND/ÂΜL (ref 0.17–1.22)
MONOCYTES NFR BLD AUTO: 10 % (ref 4–12)
NEUTROPHILS # BLD AUTO: 4.45 THOUSANDS/ÂΜL (ref 1.85–7.62)
NEUTS SEG NFR BLD AUTO: 50 % (ref 43–75)
NRBC BLD AUTO-RTO: 0 /100 WBCS
P AXIS: 136 DEGREES
P AXIS: 44 DEGREES
PLATELET # BLD AUTO: 237 THOUSANDS/UL (ref 149–390)
PMV BLD AUTO: 10.9 FL (ref 8.9–12.7)
PR INTERVAL: 142 MS
PR INTERVAL: 146 MS
QRS AXIS: 133 DEGREES
QRS AXIS: 50 DEGREES
QRSD INTERVAL: 84 MS
QRSD INTERVAL: 86 MS
QT INTERVAL: 352 MS
QT INTERVAL: 354 MS
QTC INTERVAL: 456 MS
QTC INTERVAL: 461 MS
RBC # BLD AUTO: 5.41 MILLION/UL (ref 3.88–5.62)
T WAVE AXIS: 13 DEGREES
T WAVE AXIS: 184 DEGREES
VENTRICULAR RATE: 101 BPM
VENTRICULAR RATE: 102 BPM
WBC # BLD AUTO: 8.73 THOUSAND/UL (ref 4.31–10.16)

## 2024-03-11 PROCEDURE — 93005 ELECTROCARDIOGRAM TRACING: CPT

## 2024-03-11 PROCEDURE — 90870 ELECTROCONVULSIVE THERAPY: CPT | Performed by: PSYCHIATRY & NEUROLOGY

## 2024-03-11 PROCEDURE — 83880 ASSAY OF NATRIURETIC PEPTIDE: CPT | Performed by: NURSE PRACTITIONER

## 2024-03-11 PROCEDURE — GZB2ZZZ ELECTROCONVULSIVE THERAPY, BILATERAL-SINGLE SEIZURE: ICD-10-PCS | Performed by: PSYCHIATRY & NEUROLOGY

## 2024-03-11 PROCEDURE — 84484 ASSAY OF TROPONIN QUANT: CPT | Performed by: NURSE PRACTITIONER

## 2024-03-11 PROCEDURE — 85025 COMPLETE CBC W/AUTO DIFF WBC: CPT | Performed by: NURSE PRACTITIONER

## 2024-03-11 PROCEDURE — 86140 C-REACTIVE PROTEIN: CPT | Performed by: NURSE PRACTITIONER

## 2024-03-11 PROCEDURE — 82550 ASSAY OF CK (CPK): CPT | Performed by: NURSE PRACTITIONER

## 2024-03-11 PROCEDURE — 93010 ELECTROCARDIOGRAM REPORT: CPT | Performed by: INTERNAL MEDICINE

## 2024-03-11 PROCEDURE — 90870 ELECTROCONVULSIVE THERAPY: CPT

## 2024-03-11 RX ORDER — GLYCOPYRROLATE 0.2 MG/ML
INJECTION INTRAMUSCULAR; INTRAVENOUS AS NEEDED
Status: DISCONTINUED | OUTPATIENT
Start: 2024-03-11 | End: 2024-03-11

## 2024-03-11 RX ORDER — LABETALOL HYDROCHLORIDE 5 MG/ML
INJECTION, SOLUTION INTRAVENOUS AS NEEDED
Status: DISCONTINUED | OUTPATIENT
Start: 2024-03-11 | End: 2024-03-11

## 2024-03-11 RX ORDER — HYDRALAZINE HYDROCHLORIDE 20 MG/ML
INJECTION INTRAMUSCULAR; INTRAVENOUS AS NEEDED
Status: DISCONTINUED | OUTPATIENT
Start: 2024-03-11 | End: 2024-03-11

## 2024-03-11 RX ORDER — SODIUM CHLORIDE, SODIUM LACTATE, POTASSIUM CHLORIDE, CALCIUM CHLORIDE 600; 310; 30; 20 MG/100ML; MG/100ML; MG/100ML; MG/100ML
100 INJECTION, SOLUTION INTRAVENOUS CONTINUOUS
Status: DISCONTINUED | OUTPATIENT
Start: 2024-03-11 | End: 2024-03-29 | Stop reason: HOSPADM

## 2024-03-11 RX ORDER — ESMOLOL HYDROCHLORIDE 10 MG/ML
INJECTION INTRAVENOUS AS NEEDED
Status: DISCONTINUED | OUTPATIENT
Start: 2024-03-11 | End: 2024-03-11

## 2024-03-11 RX ORDER — SUCCINYLCHOLINE/SOD CL,ISO/PF 100 MG/5ML
SYRINGE (ML) INTRAVENOUS AS NEEDED
Status: DISCONTINUED | OUTPATIENT
Start: 2024-03-11 | End: 2024-03-11

## 2024-03-11 RX ORDER — METHOHEXITAL IN WATER/PF 100MG/10ML
SYRINGE (ML) INTRAVENOUS AS NEEDED
Status: DISCONTINUED | OUTPATIENT
Start: 2024-03-11 | End: 2024-03-11

## 2024-03-11 RX ADMIN — SENNOSIDES AND DOCUSATE SODIUM 2 TABLET: 8.6; 5 TABLET ORAL at 08:14

## 2024-03-11 RX ADMIN — POLYETHYLENE GLYCOL 3350 17 G: 17 POWDER, FOR SOLUTION ORAL at 08:15

## 2024-03-11 RX ADMIN — ESCITALOPRAM OXALATE 20 MG: 10 TABLET ORAL at 08:15

## 2024-03-11 RX ADMIN — Medication 50 MG: at 06:49

## 2024-03-11 RX ADMIN — GLYCOPYRROLATE 0.2 MG: 0.2 INJECTION, SOLUTION INTRAMUSCULAR; INTRAVENOUS at 06:47

## 2024-03-11 RX ADMIN — ACETAMINOPHEN 650 MG: 325 TABLET ORAL at 08:14

## 2024-03-11 RX ADMIN — NICOTINE POLACRILEX 4 MG: 4 GUM, CHEWING ORAL at 20:22

## 2024-03-11 RX ADMIN — NICOTINE POLACRILEX 4 MG: 4 GUM, CHEWING ORAL at 15:48

## 2024-03-11 RX ADMIN — LABETALOL HYDROCHLORIDE 20 MG: 5 INJECTION, SOLUTION INTRAVENOUS at 06:46

## 2024-03-11 RX ADMIN — METFORMIN HYDROCHLORIDE 500 MG: 500 TABLET ORAL at 08:14

## 2024-03-11 RX ADMIN — SODIUM CHLORIDE, SODIUM LACTATE, POTASSIUM CHLORIDE, AND CALCIUM CHLORIDE: .6; .31; .03; .02 INJECTION, SOLUTION INTRAVENOUS at 06:56

## 2024-03-11 RX ADMIN — Medication 140 MG: at 06:49

## 2024-03-11 RX ADMIN — NICOTINE POLACRILEX 4 MG: 4 GUM, CHEWING ORAL at 17:50

## 2024-03-11 RX ADMIN — MINOCYCLINE HYDROCHLORIDE 100 MG: 100 CAPSULE ORAL at 08:14

## 2024-03-11 RX ADMIN — MELATONIN TAB 3 MG 3 MG: 3 TAB at 21:19

## 2024-03-11 RX ADMIN — MINOCYCLINE HYDROCHLORIDE 100 MG: 100 CAPSULE ORAL at 21:19

## 2024-03-11 RX ADMIN — PROPRANOLOL HYDROCHLORIDE 10 MG: 10 TABLET ORAL at 08:14

## 2024-03-11 RX ADMIN — ATROPINE SULFATE 1 DROP: 10 SOLUTION/ DROPS OPHTHALMIC at 21:18

## 2024-03-11 RX ADMIN — NICOTINE POLACRILEX 4 MG: 4 GUM, CHEWING ORAL at 22:29

## 2024-03-11 RX ADMIN — HYDRALAZINE HYDROCHLORIDE 20 MG: 20 INJECTION INTRAMUSCULAR; INTRAVENOUS at 06:47

## 2024-03-11 RX ADMIN — SENNOSIDES AND DOCUSATE SODIUM 2 TABLET: 8.6; 5 TABLET ORAL at 17:17

## 2024-03-11 RX ADMIN — CLOZAPINE 450 MG: 25 TABLET ORAL at 21:19

## 2024-03-11 RX ADMIN — PROPRANOLOL HYDROCHLORIDE 10 MG: 10 TABLET ORAL at 21:19

## 2024-03-11 NOTE — PROGRESS NOTES
03/11/24 1614   Team Meeting   Meeting Type Daily Rounds   Team Members Present   Team Members Present Physician;Nurse;   Physician Team Member Jose Luis   Nursing Team Member Tavon   Care Management Team Member Miguelito   Patient/Family Present   Patient Present No   Patient's Family Present No         Patient currently holds 201 status. Patient continues to report AH. Patient is medication and meal compliant. Patient to continue on all current scheduled medications. Patient discharge date and time is to be determined.

## 2024-03-11 NOTE — PROGRESS NOTES
Progress Note - Behavioral Health     Alberto Berumen 28 y.o. male MRN: [id number]   Unit/Bed#: [facility] 383-02 Encounter: [id number]    Documentation, nursing notes, medication reconciliation, labs, and vitals reviewed. Patient was seen for continuing care and reviewed with treatment team. No acute events over the past 24 hours.  Per nursing report, last evening staff reported he was more social and playing cards with peers.  However continues to report no change in auditory hallucinations.    No medication changes over the past 24 hours.  ECT #6 this morning.  Continues to tolerate current medications with no adverse effects.  Does complain of tiredness due to medications and ECT.  Body aches on day of ECT.     on evaluation today, he is seen in his room post-ECT.  He is tired and minimally verbal.  Still irritable.  Still reports body aches on day of ECT.  I did discuss with him we may be able to give him something post-ECT, Toradol, to help with body aches.  He is still ambivalent about continuing ECT and will think about it.  Still reports no improvement in auditory hallucinations.    No suicidal ideation, plan, or intent.  Ongoing perceptual disturbances and does not exhibit any symptoms of aimee on evaluation.    Psychiatric ROS:  Behavior over the last 24 hours: unchanged  Sleep: hypersomnia  Appetite: fair  Medication side effects:  tiredness    ROS: reports tiredness, all other systems are negative    Mental Status Evaluation:    Appearance:  marginal hygiene   Behavior:  guarded   Speech:  slow, scant   Mood:  depressed   Affect:  flat   Thought Process:  decreased rate of thoughts   Associations: concrete associations   Thought Content:  persecutory delusions   Perceptual Disturbances: auditory hallucinations   Risk Potential: Suicidal ideation - None  Homicidal ideation - None  Potential for aggression - No   Sensorium:  oriented to person, place, and time/date   Memory:  recent and remote memory grossly  intact   Consciousness:  alert and awake   Attention/Concentration: attention span and concentration appear shorter than expected for age   Insight:  limited   Judgment: limited   Gait/Station: in bed   Motor Activity: no abnormal movements     Vital signs in last 24 hours:    Temp:  [97.5 °F (36.4 °C)-98.5 °F (36.9 °C)] 97.5 °F (36.4 °C)  HR:  [] 68  Resp:  [16-20] 18  BP: (115-154)/(68-97) 137/79    Laboratory results: I have personally reviewed all pertinent laboratory/tests results    Results from the past 24 hours:   Recent Results (from the past 24 hour(s))   CBC and differential    Collection Time: 03/11/24  5:33 AM   Result Value Ref Range    WBC 8.73 4.31 - 10.16 Thousand/uL    RBC 5.41 3.88 - 5.62 Million/uL    Hemoglobin 13.1 12.0 - 17.0 g/dL    Hematocrit 43.3 36.5 - 49.3 %    MCV 80 (L) 82 - 98 fL    MCH 24.2 (L) 26.8 - 34.3 pg    MCHC 30.3 (L) 31.4 - 37.4 g/dL    RDW 13.1 11.6 - 15.1 %    MPV 10.9 8.9 - 12.7 fL    Platelets 237 149 - 390 Thousands/uL    nRBC 0 /100 WBCs    Neutrophils Relative 50 43 - 75 %    Immat GRANS % 1 0 - 2 %    Lymphocytes Relative 35 14 - 44 %    Monocytes Relative 10 4 - 12 %    Eosinophils Relative 3 0 - 6 %    Basophils Relative 1 0 - 1 %    Neutrophils Absolute 4.45 1.85 - 7.62 Thousands/µL    Immature Grans Absolute 0.04 0.00 - 0.20 Thousand/uL    Lymphocytes Absolute 3.07 0.60 - 4.47 Thousands/µL    Monocytes Absolute 0.83 0.17 - 1.22 Thousand/µL    Eosinophils Absolute 0.28 0.00 - 0.61 Thousand/µL    Basophils Absolute 0.06 0.00 - 0.10 Thousands/µL   CK    Collection Time: 03/11/24  5:33 AM   Result Value Ref Range    Total  39 - 308 U/L   B-Type Natriuretic Peptide(BNP)    Collection Time: 03/11/24  5:33 AM   Result Value Ref Range    BNP 9 0 - 100 pg/mL   C-reactive protein    Collection Time: 03/11/24  5:33 AM   Result Value Ref Range    CRP 11.6 (H) <3.0 mg/L   High Sensitivity Troponin I Random    Collection Time: 03/11/24  5:33 AM   Result Value Ref  Range    HS TnI random <2 (L) 8 - 18 ng/L       Suicide/Homicide Risk Assessment:    Risk of Harm to Self:   Current Specific Risk Factors include: current depressive symptoms, current psychotic symptoms  Protective Factors: no current suicidal ideation, ability to communicate with staff on the unit, able to contract for safety on the unit, taking medications as ordered on the unit  Based on today's assessment, Alberto presents the following risk of harm to self: minimal    Risk of Harm to Others:  Current Specific Risk Factors include: current psychotic symptoms  Protective Factors: no current homicidal ideation, able to communicate with staff on the unit, improved impulse control, impulse control is improving  Based on today's assessment, Alberto presents the following risk of harm to others: minimal    The following interventions are recommended: behavioral checks every 7 minutes, continued hospitalization on locked unit    Progress Toward Goals: no significant improvement, still depressed, still psychotic    Assessment/Plan   Principal Problem:    Schizoaffective disorder, bipolar type (HCC)  Active Problems:    GERD (gastroesophageal reflux disease)    Tobacco abuse    T wave inversion in EKG    Chronic idiopathic constipation    Vitamin B 12 deficiency    Vitamin D deficiency    Acanthosis nigricans    Rash    Class 2 obesity in adult      Recommended Treatment:     Planned medication and treatment changes:    All current active medications have been reviewed  Encourage group therapy, milieu therapy and occupational therapy  Behavioral Health checks every 7 minutes  Extended Acute Care referral  Continue ECT #7 in 2 days  Monitor weekly CBC for Clozaril  Continue current medications:    Current Facility-Administered Medications   Medication Dose Route Frequency Provider Last Rate    acetaminophen  650 mg Oral Q6H PRN Yasmin Harvey MD      acetaminophen  650 mg Oral Q4H PRN Yasmin Harvey MD       acetaminophen  975 mg Oral Q6H PRN Yasmin Harvey MD      aluminum-magnesium hydroxide-simethicone  30 mL Oral Q4H PRN Yasmin Harvey MD      atropine  1 drop Sublingual HS Prisca Villafuerte, HOLLI      atropine  1 drop Sublingual Daily PRN Prisca Villafuerte, HOLLI      haloperidol lactate  2.5 mg Intramuscular Q4H PRN Max 4/day Yasmin Harvey MD      And    LORazepam  1 mg Intramuscular Q4H PRN Max 4/day Yasmin Harvey MD      And    benztropine  0.5 mg Intramuscular Q4H PRN Max 4/day Yasmin Harvey MD      haloperidol lactate  5 mg Intramuscular Q4H PRN Max 4/day Yasmin Harvey MD      And    LORazepam  2 mg Intramuscular Q4H PRN Max 4/day Yasmin Harvey MD      And    benztropine  1 mg Intramuscular Q4H PRN Max 4/day Yasmin Harvey MD      benztropine  1 mg Oral Q4H PRN Max 6/day Yasmin Harvey MD      bisacodyl  10 mg Rectal Daily PRN Yasmin Harvey MD      cloZAPine  450 mg Oral HS Prisca Villafuerte, HOLLI      hydrOXYzine HCL  50 mg Oral Q6H PRN Max 4/day Yasmin Harvey MD      Or    diphenhydrAMINE  50 mg Intramuscular Q6H PRN Yasmin Harvey MD      diphenhydrAMINE-zinc acetate   Topical BID PRN Anjel Sagastume MD      escitalopram  20 mg Oral Daily La Flores MD      haloperidol  1 mg Oral Q6H PRN Yasmin Harvey MD      haloperidol  2.5 mg Oral Q4H PRN Max 4/day Yasmin Harvey MD      haloperidol  5 mg Oral Q4H PRN Max 4/day Yasmin Harvey MD      hydrocortisone   Topical 4x Daily PRN HOLLI Monge      hydrOXYzine HCL  100 mg Oral Q6H PRN Max 4/day Yasmin Harvey MD      Or    LORazepam  2 mg Intramuscular Q6H PRN Yasmin Harvey MD      hydrOXYzine HCL  25 mg Oral Q6H PRN Max 4/day Yasmin Harvey MD      ibuprofen  600 mg Oral Q8H PRN STEVE MongeNP      lactated ringers  100 mL/hr Intravenous Continuous Elisa Workman  mL/hr (03/11/24 0708)    lactated ringers  100 mL/hr Intravenous Continuous Elisa Workman MD       lactated ringers  100 mL/hr Intravenous Continuous Anjel Arriaza CRNA      melatonin  3 mg Oral HS Yasmin Harvey MD      metFORMIN  500 mg Oral Daily With Breakfast Eileen CherryHOLLI Jimenez      methocarbamol  500 mg Oral Q6H PRN HOLLI Monge      minocycline  100 mg Oral Q12H [facility] HOLLI Monge      nicotine polacrilex  4 mg Oral Q2H PRN Yasmin Harvey MD      ondansetron  4 mg Oral Q6H PRN HOLLI Galvan      polyethylene glycol  17 g Oral Daily PRN Yasmin Harvey MD      polyethylene glycol  17 g Oral Daily EileenHOLLI Cummins      propranolol  10 mg Oral Q12H [facility] HOLLI Anderson      senna-docusate sodium  1 tablet Oral Daily PRN Yasmin Harvey MD      senna-docusate sodium  2 tablet Oral BID La Flores MD      traZODone  50 mg Oral HS PRN Yasmin Harvey MD      white petrolatum-mineral oil   Topical TID PRN Anjel Sagastume MD       Risks / Benefits of Treatment:    Risks, benefits, and possible side effects of medications explained to patient. Patient has limited understanding of risks and benefits of treatment at this time, but agrees to take medications as prescribed.  Discussed risks and benefits of Electroconvulsive Treatment with patient including risks of anesthesia and memory loss. Alberto verbalized understanding and agreed for ECT.    Counseling / Coordination of Care:    Patient's progress discussed with staff in treatment team meeting.  Medications, treatment progress and treatment plan reviewed with patient.  At this time patient has limited understanding of diagnosis, medication changes and treatment plan and will require further explanation.    HOLLI Anderson 03/11/24

## 2024-03-11 NOTE — ANESTHESIA PREPROCEDURE EVALUATION
Procedure:  ELECTROCONVULSIVE THERAPY (ECT)    Relevant Problems   GI/HEPATIC   (+) GERD (gastroesophageal reflux disease)      Other   (+) Class 2 obesity in adult   (+) Schizoaffective disorder, bipolar type (HCC)   (+) Tobacco abuse        Physical Exam    Airway    Mallampati score: II  TM Distance: >3 FB  Neck ROM: full     Dental       Cardiovascular      Pulmonary      Other Findings        Anesthesia Plan  ASA Score- 3     Anesthesia Type- general with ASA Monitors.         Additional Monitors:     Airway Plan:            Plan Factors-    Chart reviewed. EKG reviewed.  Existing labs reviewed. Patient summary reviewed.                  Induction- intravenous.    Postoperative Plan-     Informed Consent- Anesthetic plan and risks discussed with patient.  I personally reviewed this patient with the CRNA. Discussed and agreed on the Anesthesia Plan with the CRNA..

## 2024-03-11 NOTE — NURSING NOTE
"Pt observed in bedroom after ECT. Pt denied SI/HI/VH but stated he has \" AH the same as usual.\" Pt is isolative to room. Pt is med and meal compliant. Pt Pt voices no other needs at the moment will continue to follow care plan.    "

## 2024-03-11 NOTE — PROCEDURES
Procedure Note - ECT  Alberto Berumen 28 y.o. male MRN: [id number]    Time out was taken with staff to confirm correct patient and correct procedure to be performed.    Session Number: 006    Diagnosis: Principal Problem:    Schizoaffective disorder, bipolar type (HCC)  Active Problems:    GERD (gastroesophageal reflux disease)    Tobacco abuse    T wave inversion in EKG    Chronic idiopathic constipation    Vitamin B 12 deficiency    Vitamin D deficiency    Acanthosis nigricans    Rash    Class 2 obesity in adult      ECT Type: Inpatient    Anesthesia: Methohexital    Electrode Placement: Bilateral    Energy level:  55 %      Seizure Duration     EE Sec.    EMG : 15 Sec    Post-ictal Suppression Index: 84.9 %    Results:Clinical seizure was satisfactory, Patient tolerated ECT well     Media Information    Document Information    Clinical Image - Mobile Device   EEG   2024 06:58   Attached To:   Hospital Encounter on 23   Source Information    Ted Acevedo MD   [facility]     Vitals:    24 0730   BP: 140/79   Pulse: 95   Resp: 16   Temp: 97.6 °F (36.4 °C)   SpO2: 98%        Medication Administration - last 24 hours from 03/10/2024 0745 to 2024 0745         Date/Time Order Dose Route Action Action by     03/10/2024 2150 EDT nicotine polacrilex (NICORETTE) gum 4 mg 4 mg Oral Given Abilio Carroll RN     03/10/2024 1949 EDT nicotine polacrilex (NICORETTE) gum 4 mg 4 mg Oral Given Abilio Carroll RN     03/10/2024 1746 EDT nicotine polacrilex (NICORETTE) gum 4 mg 4 mg Oral Given Abilio Carroll RN     03/10/2024 1250 EDT nicotine polacrilex (NICORETTE) gum 4 mg 4 mg Oral Given Abilio Carroll RN     03/10/2024 1020 EDT nicotine polacrilex (NICORETTE) gum 4 mg 4 mg Oral Given Abiilo Carroll RN     03/10/2024 2112 EDT melatonin tablet 3 mg 3 mg Oral Given Abilio Carroll RN     03/10/2024 1017 EDT polyethylene glycol (MIRALAX) packet 17 g 17 g Oral Given Abilio Carroll RN      03/10/2024 1016 EDT escitalopram (LEXAPRO) tablet 20 mg 20 mg Oral Given Abilio Carroll RN     03/10/2024 1016 EDT metFORMIN (GLUCOPHAGE) tablet 500 mg 500 mg Oral Given Abilio Carroll RN     03/10/2024 2112 EDT minocycline (MINOCIN) capsule 100 mg 100 mg Oral Given Abilio Carroll RN     03/10/2024 1016 EDT minocycline (MINOCIN) capsule 100 mg 100 mg Oral Given Abilio Carroll RN     03/10/2024 2112 EDT propranolol (INDERAL) tablet 10 mg 10 mg Oral Given Abilio Carroll RN     03/10/2024 0826 EDT propranolol (INDERAL) tablet 10 mg 10 mg Oral Not Given Abilio Carroll RN     03/10/2024 2117 EDT atropine (ISOPTO ATROPINE) 1 % ophthalmic solution 1 drop 1 drop Sublingual Given Abilio Carroll RN     03/10/2024 1746 EDT senna-docusate sodium (SENOKOT S) 8.6-50 mg per tablet 2 tablet 2 tablet Oral Given Abilio Carroll RN     03/10/2024 1016 EDT senna-docusate sodium (SENOKOT S) 8.6-50 mg per tablet 2 tablet 2 tablet Oral Given Abilio Carroll RN     03/10/2024 2112 EDT cloZAPine (CLOZARIL) tablet 450 mg 450 mg Oral Given Abilio Carroll RN     03/11/2024 0708 EDT lactated ringers infusion -- Intravenous Rate/Dose Change Last Pillai CRNA     03/11/2024 0656 EDT lactated ringers infusion -- Intravenous New Bag Last Pillai CRNA     03/11/2024 0645 EDT lactated ringers infusion -- Intravenous Continue from Pre-op Last Pillai CRNA

## 2024-03-11 NOTE — NURSING NOTE
Pt returned from ECT #6. Pt denies N/V, Pt reports a headache. Vitals are stable, steady gait no dizziness. No further concerns at the time.

## 2024-03-12 PROCEDURE — 99232 SBSQ HOSP IP/OBS MODERATE 35: CPT | Performed by: STUDENT IN AN ORGANIZED HEALTH CARE EDUCATION/TRAINING PROGRAM

## 2024-03-12 RX ADMIN — NICOTINE POLACRILEX 4 MG: 4 GUM, CHEWING ORAL at 21:25

## 2024-03-12 RX ADMIN — NICOTINE POLACRILEX 4 MG: 4 GUM, CHEWING ORAL at 19:11

## 2024-03-12 RX ADMIN — MINOCYCLINE HYDROCHLORIDE 100 MG: 100 CAPSULE ORAL at 21:09

## 2024-03-12 RX ADMIN — NICOTINE POLACRILEX 4 MG: 4 GUM, CHEWING ORAL at 17:08

## 2024-03-12 RX ADMIN — POLYETHYLENE GLYCOL 3350 17 G: 17 POWDER, FOR SOLUTION ORAL at 08:02

## 2024-03-12 RX ADMIN — PROPRANOLOL HYDROCHLORIDE 10 MG: 10 TABLET ORAL at 21:09

## 2024-03-12 RX ADMIN — SENNOSIDES AND DOCUSATE SODIUM 2 TABLET: 8.6; 5 TABLET ORAL at 08:02

## 2024-03-12 RX ADMIN — MINOCYCLINE HYDROCHLORIDE 100 MG: 100 CAPSULE ORAL at 08:02

## 2024-03-12 RX ADMIN — METFORMIN HYDROCHLORIDE 500 MG: 500 TABLET ORAL at 08:02

## 2024-03-12 RX ADMIN — SENNOSIDES AND DOCUSATE SODIUM 2 TABLET: 8.6; 5 TABLET ORAL at 17:54

## 2024-03-12 RX ADMIN — PROPRANOLOL HYDROCHLORIDE 10 MG: 10 TABLET ORAL at 08:02

## 2024-03-12 RX ADMIN — ATROPINE SULFATE 1 DROP: 10 SOLUTION/ DROPS OPHTHALMIC at 22:36

## 2024-03-12 RX ADMIN — MELATONIN TAB 3 MG 3 MG: 3 TAB at 21:09

## 2024-03-12 RX ADMIN — ESCITALOPRAM OXALATE 20 MG: 10 TABLET ORAL at 08:02

## 2024-03-12 RX ADMIN — CLOZAPINE 450 MG: 25 TABLET ORAL at 21:09

## 2024-03-12 RX ADMIN — NICOTINE POLACRILEX 4 MG: 4 GUM, CHEWING ORAL at 14:14

## 2024-03-12 NOTE — NURSING NOTE
"Pt denies anxiety, depression, SI/HI/VH, Pt has negative AH \"the same as usual\". Pt is med compliant. Pt is calm and cooperative. Mostly isolative to his room, sleeping. Pt has no other needs at the moment.   "

## 2024-03-12 NOTE — NURSING NOTE
"Pt visible and social,  attending groups.  Pt reports AH telling him \"that I'm going to die\".  Pt able to cope by staying social.  Pt denies SI and HI,  pt also denies VH. Pt is med/meal compliant.  PT is pleasant and cooperative.  Dressed in personal attire.    "

## 2024-03-12 NOTE — PROGRESS NOTES
"Progress Note - Behavioral Health     Alberto Berumen 28 y.o. male MRN: [id number]   Unit/Bed#: [facility] 383-02 Encounter: [id number]    Documentation, nursing notes, medication reconciliation, labs, and vitals reviewed. Patient was seen for continuing care and reviewed with treatment team. No acute events over the past 24 hours.  Per nursing report, he again was noted less depressed and more social last evening, even denying hallucinations.    No medication changes over the past 24 hours. Continues ECT, #7 scheduled for tomorrow Continues to tolerate current medications with no adverse effects. On evaluation today, He is seen in his room and resting in bed.  States he is agreeable to continuing ECT, would like to try Toradol after ECT to see if it will help with the body aches after ECT.  He is still minimally verbal and reports minimal improvements, but admits \"maybe\" voices decreasing in intensity . He does acknowledge he wonders if some government agency at the root of his hallucinations, states this is not a new thought - but also continues to persist.  No suicidal ideation, plan, or intent. Decrease of  perceptual disturbances and does not exhibit any symptoms of aimee on evaluation.    Psychiatric ROS:  Behavior over the last 24 hours: slowly improving  Sleep: hypersomnia  Appetite: normal  Medication side effects: Yes - tiredness    ROS: reports body aches, all other systems are negative    Mental Status Evaluation:    Appearance:  age appropriate   Behavior:  guarded   Speech:  scant, soft   Mood:  depressed   Affect:  constricted   Thought Process:  goal directed   Associations: concrete associations   Thought Content:  some paranoia   Perceptual Disturbances: auditory hallucinations   Risk Potential: Suicidal ideation - None  Homicidal ideation - None  Potential for aggression - No   Sensorium:  oriented to person, place, and time/date   Memory:  recent and remote memory grossly intact   Consciousness:  alert " and awake   Attention/Concentration: decreased concentration and decreased attention span   Insight:  poor   Judgment: poor   Gait/Station: normal gait/station, normal balance   Motor Activity: no abnormal movements     Vital signs in last 24 hours:    Temp:  [97.6 °F (36.4 °C)-98.7 °F (37.1 °C)] 98.7 °F (37.1 °C)  HR:  [60-96] 68  Resp:  [16-18] 18  BP: (111-138)/(67-78) 138/67    Laboratory results: I have personally reviewed all pertinent laboratory/tests results    Results from the past 24 hours:   Recent Results (from the past 24 hour(s))   ECG 12 lead    Collection Time: 03/11/24  3:41 PM   Result Value Ref Range    Ventricular Rate 101 BPM    Atrial Rate 101 BPM    LA Interval 142 ms    QRSD Interval 84 ms    QT Interval 352 ms    QTC Interval 456 ms    P Axis 136 degrees    QRS Axis 133 degrees    T Wave Axis 184 degrees   ECG 12 lead    Collection Time: 03/11/24  3:42 PM   Result Value Ref Range    Ventricular Rate 102 BPM    Atrial Rate 102 BPM    LA Interval 146 ms    QRSD Interval 86 ms    QT Interval 354 ms    QTC Interval 461 ms    P [place] 44 degrees    QRS Axis 50 degrees    T Wave Axis 13 degrees       Suicide/Homicide Risk Assessment:    Risk of Harm to Self:   Current Specific Risk Factors include: current depressive symptoms, presence of hallucinations  Protective Factors: no current suicidal ideation, ability to communicate with staff on the unit, able to contract for safety on the unit, taking medications as ordered on the unit  Based on today's assessment, Alberto presents the following risk of harm to self: minimal    Risk of Harm to Others:  Current Specific Risk Factors include: current psychotic symptoms  Protective Factors: no current homicidal ideation, psychotic symptoms are less prominent, compliant with medications on the unit as ordered  Based on today's assessment, Alberto presents the following risk of harm to others: minimal    The following interventions are recommended: behavioral  checks every 7 minutes, continued hospitalization on locked unit    Progress Toward Goals: minimal improvement, depression is improving, still psychotic    Assessment/Plan   Principal Problem:    Schizoaffective disorder, bipolar type (HCC)  Active Problems:    GERD (gastroesophageal reflux disease)    Tobacco abuse    T wave inversion in EKG    Chronic idiopathic constipation    Vitamin B 12 deficiency    Vitamin D deficiency    Acanthosis nigricans    Rash    Class 2 obesity in adult      Recommended Treatment:     Planned medication and treatment changes:    All current active medications have been reviewed  Encourage group therapy, milieu therapy and occupational therapy  Behavioral Health checks every 7 minutes  Consider Extended Acute Care referral  Continue ECT #7 in the morning  Continue current medications:    Current Facility-Administered Medications   Medication Dose Route Frequency Provider Last Rate    acetaminophen  650 mg Oral Q6H PRN Yasmin Harvey MD      acetaminophen  650 mg Oral Q4H PRN Yasmin Harvey MD      acetaminophen  975 mg Oral Q6H PRN Yasmin Harvey MD      aluminum-magnesium hydroxide-simethicone  30 mL Oral Q4H PRN Yasmin Harvey MD      atropine  1 drop Sublingual HS HOLLI Anderson      atropine  1 drop Sublingual Daily PRN HOLLI Anderson      haloperidol lactate  2.5 mg Intramuscular Q4H PRN Max 4/day Yasmin Harvey MD      And    LORazepam  1 mg Intramuscular Q4H PRN Max 4/day Yasmin Harvey MD      And    benztropine  0.5 mg Intramuscular Q4H PRN Max 4/day Yasmin Harvey MD      haloperidol lactate  5 mg Intramuscular Q4H PRN Max 4/day Yasmin Harvey MD      And    LORazepam  2 mg Intramuscular Q4H PRN Max 4/day Yasmin Harvey MD      And    benztropine  1 mg Intramuscular Q4H PRN Max 4/day Yasmin Harvey MD      benztropine  1 mg Oral Q4H PRN Max 6/day Yasmin Harvey MD      bisacodyl  10 mg Rectal Daily PRN Yasmin Harvey MD       cloZAPine  450 mg Oral HS HOLLI Anderson      hydrOXYzine HCL  50 mg Oral Q6H PRN Max 4/day Yasmin Harvey MD      Or    diphenhydrAMINE  50 mg Intramuscular Q6H PRN Yasmin Harvey MD      diphenhydrAMINE-zinc acetate   Topical BID PRN Anjel Sagastume MD      escitalopram  20 mg Oral Daily La Flores MD      haloperidol  1 mg Oral Q6H PRN Yasmin Harvey MD      haloperidol  2.5 mg Oral Q4H PRN Max 4/day Yasmin Harvey MD      haloperidol  5 mg Oral Q4H PRN Max 4/day Yasmin Harvey MD      hydrocortisone   Topical 4x Daily PRN HOLLI Monge      hydrOXYzine HCL  100 mg Oral Q6H PRN Max 4/day Yasmin Harvey MD      Or    LORazepam  2 mg Intramuscular Q6H PRN Yasmin Harvye MD      hydrOXYzine HCL  25 mg Oral Q6H PRN Max 4/day Yasmin Harvey MD      ibuprofen  600 mg Oral Q8H PRN HOLLI Monge      lactated ringers  100 mL/hr Intravenous Continuous Elisa Workman  mL/hr (03/11/24 0708)    lactated ringers  100 mL/hr Intravenous Continuous Elisa Workman MD      lactated ringers  100 mL/hr Intravenous Continuous Anjel Arriaza CRNA      melatonin  3 mg Oral HS Yasmin Harvey MD      metFORMIN  500 mg Oral Daily With Breakfast HOLLI Monge      methocarbamol  500 mg Oral Q6H PRN HOLLI Monge      minocycline  100 mg Oral Q12H KAYLIE HOLLI Monge      nicotine polacrilex  4 mg Oral Q2H PRN Yasmin Harvey MD      ondansetron  4 mg Oral Q6H PRN HOLLI Galvan      polyethylene glycol  17 g Oral Daily PRN Yasmin Harvey MD      polyethylene glycol  17 g Oral Daily HOLLI Monge      propranolol  10 mg Oral Q12H [facility] HOLLI Anderson      senna-docusate sodium  1 tablet Oral Daily PRN Yasmin Harvey MD      senna-docusate sodium  2 tablet Oral BID La Flores MD      traZODone  50 mg Oral HS PRN Yasmin Harvey MD      white petrolatum-mineral oil    Topical TID PRN Anjel Sagastume MD       Risks / Benefits of Treatment:    Risks, benefits, and possible side effects of medications explained to patient and patient verbalizes understanding and agreement for treatment.  Discussed risks and benefits of Electroconvulsive Treatment with patient including risks of anesthesia and memory loss. Alberto verbalized understanding and agreed for ECT.    Counseling / Coordination of Care:    Patient's progress discussed with staff in treatment team meeting.  Medications, treatment progress and treatment plan reviewed with patient.    HOLLI Anderson 03/12/24

## 2024-03-12 NOTE — PLAN OF CARE
Problem: PSYCHOSIS  Goal: Will report no hallucinations or delusions  Description: Interventions:  - Administer medication as  ordered  - Every waking shifts and PRN assess for the presence of hallucinations and or delusions  - Assist with reality testing to support increasing orientation  - Assess if patient's hallucinations or delusions are encouraging self-harm or harm to others and intervene as appropriate  Outcome: Progressing     Problem: ANXIETY  Goal: Will report anxiety at manageable levels  Description: INTERVENTIONS:  - Administer medication as ordered  - Teach and encourage coping skills  - Provide emotional support  - Assess patient/family for anxiety and ability to cope  Outcome: Progressing     Problem: Ineffective Coping  Goal: Participates in unit activities  Description: Interventions:  - Provide therapeutic environment   - Provide required programming   - Redirect inappropriate behaviors   Outcome: Progressing     Problem: Depression  Goal: Treatment Goal: Demonstrate behavioral control of depressive symptoms, verbalize feelings of improved mood/affect, and adopt new coping skills prior to discharge  Outcome: Progressing  Goal: Verbalize thoughts and feelings  Description: Interventions:  - Assess and re-assess patient's level of risk   - Engage patient in 1:1 interactions, daily, for a minimum of 15 minutes   - Encourage patient to express feelings, fears, frustrations, hopes   Outcome: Progressing  Goal: Refrain from isolation  Description: Interventions:  - Develop a trusting relationship   - Encourage socialization   Outcome: Progressing  Goal: Refrain from self-neglect  Outcome: Progressing     Problem: Electroconvulsive therapy (ECT)  Goal: Treatment Goal: Demonstrate a reduction of confusion and improved orientation to person, place, time and/or situation upon discharge, according to optimum baseline/ability  Outcome: Progressing  Goal: Verbalizes/displays adequate comfort level or  baseline comfort level  Description: Interventions:  - Encourage patient to monitor pain and request assistance  - Assess pain using appropriate pain scale  - Administer analgesics based on type and severity of pain and evaluate response  - Implement non-pharmacological measures as appropriate and evaluate response  - Consider cultural and social influences on pain and pain management  - Notify physician/advanced practitioner if interventions unsuccessful or patient reports new pain  Outcome: Progressing  Goal: Achieves stable or improved neurological status  Description: INTERVENTIONS  - Monitor and report changes in neurological status  - Monitor vital signs such as temperature, blood pressure, glucose, and any other labs ordered   - Initiate measures to prevent increased intracranial pressure  - Monitor for seizure activity and implement precautions if appropriate      Outcome: Progressing  Goal: Achieves maximal functionality and self care  Description: INTERVENTIONS  - Monitor swallowing and airway patency with patient fatigue and changes in neurological status  - Encourage and assist patient to increase activity and self care.   - Encourage visually impaired, hearing impaired and aphasic patients to use assistive/communication devices  Outcome: Progressing  Goal: Maintain or return mobility to safest level of function  Description: INTERVENTIONS:  - Assess patient's ability to carry out ADLs; assess patient's baseline for ADL function and identify physical deficits which impact ability to perform ADLs (bathing, care of mouth/teeth, toileting, grooming, dressing, etc.)  - Assess/evaluate cause of self-care deficits   - Assess range of motion  - Assess patient's mobility  - Assess patient's need for assistive devices and provide as appropriate  - Encourage maximum independence but intervene and supervise when necessary  - Involve family in performance of ADLs  - Assess for home care needs following discharge   -  Consider OT consult to assist with ADL evaluation and planning for discharge  - Provide patient education as appropriate  Outcome: Progressing  Goal: Absence of urinary retention  Description: INTERVENTIONS:  - Assess patient’s ability to void and empty bladder  - Monitor I/O  - Bladder scan as needed  - Discuss with physician/AP medications to alleviate retention as needed  - Discuss catheterization for long term situations as appropriate  Outcome: Progressing  Goal: Minimal or absence of nausea and/or vomiting  Description: INTERVENTIONS:  - Administer IV fluids if ordered to ensure adequate hydration  - Maintain NPO status until nausea and vomiting are resolved  - Nasogastric tube if ordered  - Administer ordered antiemetic medications as needed  - Provide nonpharmacologic comfort measures as appropriate  - Advance diet as tolerated, if ordered  - Consider nutrition services referral to assist patient with adequate nutrition and appropriate food choices  Outcome: Progressing  Goal: Maintains adequate nutritional intake  Description: INTERVENTIONS:  - Monitor percentage of each meal consumed  - Identify factors contributing to decreased intake, treat as appropriate  - Assist with meals as needed  - Monitor I&O, weight, and lab values if indicated  - Obtain nutrition services referral as needed  Outcome: Progressing     Problem: DISCHARGE PLANNING  Goal: Discharge to home or other facility with appropriate resources  Description: INTERVENTIONS:  - Identify barriers to discharge w/patient and caregiver  - Arrange for needed discharge resources and transportation as appropriate  - Identify discharge learning needs (meds, wound care, etc.)  - Arrange for interpretive services to assist at discharge as needed  - Refer to Case Management Department for coordinating discharge planning if the patient needs post-hospital services based on physician/advanced practitioner order or complex needs related to functional status,  cognitive ability, or social support system  Outcome: Progressing

## 2024-03-12 NOTE — NURSING NOTE
Pt denies SI/HI/AH/VH at this time. Present in dayroom and milieu. Social with peers. Compliant with PM medication. Pt appears less depressed and brightens on approach. No further concerns as of present. Plan of care ongoing.

## 2024-03-12 NOTE — PROGRESS NOTES
03/12/24 1508   Team Meeting   Meeting Type Daily Rounds   Team Members Present   Team Members Present Physician;Nurse;   Physician Team Member Jose Luis   Nursing Team Member Giancarlo   Care Management Team Member Miguelito   Patient/Family Present   Patient Present No   Patient's Family Present No     Patient currently holds 201 status. Patient continues to report AH but denies all other symptoms. Patient is medication and meal compliant. Patient to continue on all current scheduled medications. Patient discharge date and time is to be determined.

## 2024-03-13 ENCOUNTER — APPOINTMENT (INPATIENT)
Dept: PREOP/PACU | Facility: HOSPITAL | Age: 29
DRG: 750 | End: 2024-03-13
Attending: STUDENT IN AN ORGANIZED HEALTH CARE EDUCATION/TRAINING PROGRAM
Payer: COMMERCIAL

## 2024-03-13 ENCOUNTER — ANESTHESIA (INPATIENT)
Dept: PREOP/PACU | Facility: HOSPITAL | Age: 29
End: 2024-03-13
Payer: COMMERCIAL

## 2024-03-13 ENCOUNTER — ANESTHESIA EVENT (INPATIENT)
Dept: PREOP/PACU | Facility: HOSPITAL | Age: 29
End: 2024-03-13
Payer: COMMERCIAL

## 2024-03-13 PROCEDURE — 90870 ELECTROCONVULSIVE THERAPY: CPT

## 2024-03-13 PROCEDURE — 90870 ELECTROCONVULSIVE THERAPY: CPT | Performed by: PSYCHIATRY & NEUROLOGY

## 2024-03-13 PROCEDURE — 99232 SBSQ HOSP IP/OBS MODERATE 35: CPT | Performed by: STUDENT IN AN ORGANIZED HEALTH CARE EDUCATION/TRAINING PROGRAM

## 2024-03-13 PROCEDURE — GZB2ZZZ ELECTROCONVULSIVE THERAPY, BILATERAL-SINGLE SEIZURE: ICD-10-PCS | Performed by: PSYCHIATRY & NEUROLOGY

## 2024-03-13 RX ORDER — SODIUM CHLORIDE, SODIUM LACTATE, POTASSIUM CHLORIDE, CALCIUM CHLORIDE 600; 310; 30; 20 MG/100ML; MG/100ML; MG/100ML; MG/100ML
INJECTION, SOLUTION INTRAVENOUS CONTINUOUS PRN
Status: DISCONTINUED | OUTPATIENT
Start: 2024-03-13 | End: 2024-03-13

## 2024-03-13 RX ORDER — SUCCINYLCHOLINE/SOD CL,ISO/PF 100 MG/5ML
SYRINGE (ML) INTRAVENOUS AS NEEDED
Status: DISCONTINUED | OUTPATIENT
Start: 2024-03-13 | End: 2024-03-13

## 2024-03-13 RX ORDER — ONDANSETRON 2 MG/ML
4 INJECTION INTRAMUSCULAR; INTRAVENOUS ONCE AS NEEDED
Status: CANCELLED | OUTPATIENT
Start: 2024-03-13

## 2024-03-13 RX ORDER — SODIUM CHLORIDE, SODIUM LACTATE, POTASSIUM CHLORIDE, CALCIUM CHLORIDE 600; 310; 30; 20 MG/100ML; MG/100ML; MG/100ML; MG/100ML
100 INJECTION, SOLUTION INTRAVENOUS CONTINUOUS
Status: DISCONTINUED | OUTPATIENT
Start: 2024-03-13 | End: 2024-03-29 | Stop reason: HOSPADM

## 2024-03-13 RX ORDER — ESMOLOL HYDROCHLORIDE 10 MG/ML
INJECTION INTRAVENOUS AS NEEDED
Status: DISCONTINUED | OUTPATIENT
Start: 2024-03-13 | End: 2024-03-13

## 2024-03-13 RX ORDER — ALBUTEROL SULFATE 2.5 MG/3ML
2.5 SOLUTION RESPIRATORY (INHALATION) ONCE AS NEEDED
Status: CANCELLED | OUTPATIENT
Start: 2024-03-13

## 2024-03-13 RX ORDER — ONDANSETRON 2 MG/ML
4 INJECTION INTRAMUSCULAR; INTRAVENOUS ONCE AS NEEDED
Status: DISCONTINUED | OUTPATIENT
Start: 2024-03-13 | End: 2024-03-14 | Stop reason: HOSPADM

## 2024-03-13 RX ORDER — LABETALOL HYDROCHLORIDE 5 MG/ML
INJECTION, SOLUTION INTRAVENOUS AS NEEDED
Status: DISCONTINUED | OUTPATIENT
Start: 2024-03-13 | End: 2024-03-13

## 2024-03-13 RX ORDER — METHOHEXITAL IN WATER/PF 100MG/10ML
SYRINGE (ML) INTRAVENOUS AS NEEDED
Status: DISCONTINUED | OUTPATIENT
Start: 2024-03-13 | End: 2024-03-13

## 2024-03-13 RX ORDER — SODIUM CHLORIDE, SODIUM LACTATE, POTASSIUM CHLORIDE, CALCIUM CHLORIDE 600; 310; 30; 20 MG/100ML; MG/100ML; MG/100ML; MG/100ML
100 INJECTION, SOLUTION INTRAVENOUS CONTINUOUS
Status: CANCELLED | OUTPATIENT
Start: 2024-03-13

## 2024-03-13 RX ORDER — GLYCOPYRROLATE 0.2 MG/ML
INJECTION INTRAMUSCULAR; INTRAVENOUS AS NEEDED
Status: DISCONTINUED | OUTPATIENT
Start: 2024-03-13 | End: 2024-03-13

## 2024-03-13 RX ORDER — ALBUTEROL SULFATE 2.5 MG/3ML
2.5 SOLUTION RESPIRATORY (INHALATION) ONCE AS NEEDED
Status: DISCONTINUED | OUTPATIENT
Start: 2024-03-13 | End: 2024-03-14 | Stop reason: HOSPADM

## 2024-03-13 RX ORDER — HYDRALAZINE HYDROCHLORIDE 20 MG/ML
5 INJECTION INTRAMUSCULAR; INTRAVENOUS
Status: DISCONTINUED | OUTPATIENT
Start: 2024-03-13 | End: 2024-03-29 | Stop reason: HOSPADM

## 2024-03-13 RX ORDER — KETOROLAC TROMETHAMINE 30 MG/ML
INJECTION, SOLUTION INTRAMUSCULAR; INTRAVENOUS AS NEEDED
Status: DISCONTINUED | OUTPATIENT
Start: 2024-03-13 | End: 2024-03-13

## 2024-03-13 RX ADMIN — Medication 120 MG: at 06:40

## 2024-03-13 RX ADMIN — METFORMIN HYDROCHLORIDE 500 MG: 500 TABLET ORAL at 08:26

## 2024-03-13 RX ADMIN — NICOTINE POLACRILEX 4 MG: 4 GUM, CHEWING ORAL at 21:45

## 2024-03-13 RX ADMIN — ESMOLOL HYDROCHLORIDE 50 MG: 10 INJECTION, SOLUTION INTRAVENOUS at 06:44

## 2024-03-13 RX ADMIN — GLYCOPYRROLATE 0.4 MG: 0.2 INJECTION, SOLUTION INTRAMUSCULAR; INTRAVENOUS at 06:33

## 2024-03-13 RX ADMIN — SENNOSIDES AND DOCUSATE SODIUM 2 TABLET: 8.6; 5 TABLET ORAL at 17:04

## 2024-03-13 RX ADMIN — ESCITALOPRAM OXALATE 20 MG: 10 TABLET ORAL at 08:26

## 2024-03-13 RX ADMIN — PROPRANOLOL HYDROCHLORIDE 10 MG: 10 TABLET ORAL at 21:22

## 2024-03-13 RX ADMIN — NICOTINE POLACRILEX 4 MG: 4 GUM, CHEWING ORAL at 20:01

## 2024-03-13 RX ADMIN — CLOZAPINE 450 MG: 25 TABLET ORAL at 21:23

## 2024-03-13 RX ADMIN — SODIUM CHLORIDE, SODIUM LACTATE, POTASSIUM CHLORIDE, AND CALCIUM CHLORIDE: .6; .31; .03; .02 INJECTION, SOLUTION INTRAVENOUS at 06:22

## 2024-03-13 RX ADMIN — MINOCYCLINE HYDROCHLORIDE 100 MG: 100 CAPSULE ORAL at 21:22

## 2024-03-13 RX ADMIN — LABETALOL HYDROCHLORIDE 10 MG: 5 INJECTION, SOLUTION INTRAVENOUS at 06:45

## 2024-03-13 RX ADMIN — NICOTINE POLACRILEX 4 MG: 4 GUM, CHEWING ORAL at 18:04

## 2024-03-13 RX ADMIN — NICOTINE POLACRILEX 4 MG: 4 GUM, CHEWING ORAL at 13:03

## 2024-03-13 RX ADMIN — LABETALOL HYDROCHLORIDE 10 MG: 5 INJECTION, SOLUTION INTRAVENOUS at 06:51

## 2024-03-13 RX ADMIN — MELATONIN TAB 3 MG 3 MG: 3 TAB at 21:22

## 2024-03-13 RX ADMIN — POLYETHYLENE GLYCOL 3350 17 G: 17 POWDER, FOR SOLUTION ORAL at 08:26

## 2024-03-13 RX ADMIN — NICOTINE POLACRILEX 4 MG: 4 GUM, CHEWING ORAL at 15:54

## 2024-03-13 RX ADMIN — Medication 140 MG: at 06:40

## 2024-03-13 RX ADMIN — MINOCYCLINE HYDROCHLORIDE 100 MG: 100 CAPSULE ORAL at 08:26

## 2024-03-13 RX ADMIN — SENNOSIDES AND DOCUSATE SODIUM 2 TABLET: 8.6; 5 TABLET ORAL at 08:26

## 2024-03-13 RX ADMIN — KETOROLAC TROMETHAMINE 30 MG: 30 INJECTION, SOLUTION INTRAMUSCULAR; INTRAVENOUS at 06:46

## 2024-03-13 RX ADMIN — SODIUM CHLORIDE, SODIUM LACTATE, POTASSIUM CHLORIDE, AND CALCIUM CHLORIDE 100 ML/HR: .6; .31; .03; .02 INJECTION, SOLUTION INTRAVENOUS at 06:10

## 2024-03-13 RX ADMIN — ATROPINE SULFATE 1 DROP: 10 SOLUTION/ DROPS OPHTHALMIC at 21:31

## 2024-03-13 NOTE — PROGRESS NOTES
Progress Note - Behavioral Health     Alberto Berumen 28 y.o. male MRN: [id number]   Unit/Bed#: [facility] 383-02 Encounter: [id number]    Documentation, nursing notes, medication reconciliation, labs, and vitals reviewed. Patient was seen for continuing care and reviewed with treatment team. No acute events over the past 24 hours.  Per nursing report, isolative during the day and more active and social in the evening.  Reports no change in auditory hallucinations.    Medication changes over the past 24 hours.  Continues with ECT treatments .continues to tolerate current medications with no adverse effects.     On evaluation today, he is seen in his room and resting in bed post-ECT.  Scant verbalizations post-ECT.  Currently denying any pain or discomfort.  Continues to report no improvement in auditory hallucinations.    No suicidal ideation, plan, or intent.  Ongoing  perceptual disturbances and does not exhibit any symptoms of aimee on evaluation.    Psychiatric ROS:  Behavior over the last 24 hours: unchanged  Sleep: slept off and on  Appetite: fair  Medication side effects: No   ROS: no complaints, all other systems are negative    Mental Status Evaluation:    Appearance:  dressed in hospital attire   Behavior:  cooperative   Speech:  scant   Mood:  depressed, dysphoric   Affect:  constricted   Thought Process:  decreased rate of thoughts   Associations: concrete associations   Thought Content:  paranoid ideation   Perceptual Disturbances: auditory hallucinations   Risk Potential: Suicidal ideation - None  Homicidal ideation - None  Potential for aggression - No   Sensorium:  oriented to person, place, and time/date   Memory:  recent and remote memory grossly intact   Consciousness:  alert and awake   Attention/Concentration: decreased concentration and decreased attention span   Insight:  limited   Judgment: limited   Gait/Station: normal gait/station, normal balance   Motor Activity: no abnormal movements     Vital  signs in last 24 hours:    Temp:  [97.7 °F (36.5 °C)-98.3 °F (36.8 °C)] 97.7 °F (36.5 °C)  HR:  [] 84  Resp:  [13-20] 16  BP: ()/(51-91) 114/60    Laboratory results: I have personally reviewed all pertinent laboratory/tests results    Results from the past 24 hours: No results found for this or any previous visit (from the past 24 hour(s)).    Suicide/Homicide Risk Assessment:    Risk of Harm to Self:   Current Specific Risk Factors include: current depressive symptoms, presence of hallucinations  Protective Factors: no current suicidal ideation, ability to communicate with staff on the unit, able to contract for safety on the unit, taking medications as ordered on the unit  Based on today's assessment, Alberto presents the following risk of harm to self: minimal    Risk of Harm to Others:  Current Specific Risk Factors include: current psychotic symptoms  Protective Factors: no current homicidal ideation, able to communicate with staff on the unit, psychotic symptoms are less prominent, compliant with medications on the unit as ordered  Based on today's assessment, Alberto presents the following risk of harm to others: minimal    The following interventions are recommended: behavioral checks every 7 minutes, continued hospitalization on locked unit    Progress Toward Goals: minimal improvement, still depressed, poor motivation, still has psychotic symptoms    Assessment/Plan   Principal Problem:    Schizoaffective disorder, bipolar type (HCC)  Active Problems:    GERD (gastroesophageal reflux disease)    Tobacco abuse    T wave inversion in EKG    Chronic idiopathic constipation    Vitamin B 12 deficiency    Vitamin D deficiency    Acanthosis nigricans    Rash    Class 2 obesity in adult      Recommended Treatment:     Planned medication and treatment changes:    All current active medications have been reviewed  Encourage group therapy, milieu therapy and occupational therapy  Behavioral Health checks  every 7 minutes  Consider Extended Acute Care referral  Continue ECT #8 in 2 days  Continue current medications:    Current Facility-Administered Medications   Medication Dose Route Frequency Provider Last Rate    acetaminophen  650 mg Oral Q6H PRN Yasmin Harvey MD      acetaminophen  650 mg Oral Q4H PRN Yasmin Harvey MD      acetaminophen  975 mg Oral Q6H PRN Yasmin Harvey MD      albuterol  2.5 mg Nebulization Once PRN Elisa Workman MD      aluminum-magnesium hydroxide-simethicone  30 mL Oral Q4H PRN Yasmin Harvey MD      atropine  1 drop Sublingual HS HOLLI Anderson      atropine  1 drop Sublingual Daily PRN HOLLI Anderson      haloperidol lactate  2.5 mg Intramuscular Q4H PRN Max 4/day Yasmin Harvey MD      And    LORazepam  1 mg Intramuscular Q4H PRN Max 4/day Yasmin Harvey MD      And    benztropine  0.5 mg Intramuscular Q4H PRN Max 4/day Yasmin Harvey MD      haloperidol lactate  5 mg Intramuscular Q4H PRN Max 4/day Yasmin Harvey MD      And    LORazepam  2 mg Intramuscular Q4H PRN Max 4/day Yasmin Harvey MD      And    benztropine  1 mg Intramuscular Q4H PRN Max 4/day Yasmin Harvey MD      benztropine  1 mg Oral Q4H PRN Max 6/day Yasmin Harvey MD      bisacodyl  10 mg Rectal Daily PRN Yasmin Harvey MD      cloZAPine  450 mg Oral HS HOLLI Anderson      hydrOXYzine HCL  50 mg Oral Q6H PRN Max 4/day Yasmin Harvey MD      Or    diphenhydrAMINE  50 mg Intramuscular Q6H PRN Yasmin Harvey MD      diphenhydrAMINE-zinc acetate   Topical BID PRN Anjel Sagastume MD      escitalopram  20 mg Oral Daily La Flores MD      haloperidol  1 mg Oral Q6H PRN Yasmin Harvey MD      haloperidol  2.5 mg Oral Q4H PRN Max 4/day Yasmin Harvey MD      haloperidol  5 mg Oral Q4H PRN Max 4/day Yasmin Harvey MD      hydrALAZINE  5 mg Intravenous Q10 Min PRN Khoa Norman Kleca, CRNA      hydrocortisone   Topical 4x Daily PRN Eileen Jensen,  CRNP      hydrOXYzine HCL  100 mg Oral Q6H PRN Max 4/day Yasmin Harvey MD      Or    LORazepam  2 mg Intramuscular Q6H PRN Yasmin Harvey MD      hydrOXYzine HCL  25 mg Oral Q6H PRN Max 4/day Yasmin Harvey MD      ibuprofen  600 mg Oral Q8H PRN Eileen Holliday Mikey, CRNP      lactated ringers  100 mL/hr Intravenous Continuous Elisa Workman  mL/hr (03/11/24 0708)    lactated ringers  100 mL/hr Intravenous Continuous Elisa Workman MD      lactated ringers  100 mL/hr Intravenous Continuous Anjel Arriaza CRNA      lactated ringers  100 mL/hr Intravenous Continuous Elisa Workman  mL/hr (03/13/24 0610)    melatonin  3 mg Oral HS Yasmin Harvey MD      metFORMIN  500 mg Oral Daily With Breakfast Eileen Gonzaleznathan, HOLLI      methocarbamol  500 mg Oral Q6H PRN Eileen Holliday Mikey, STEVENP      minocycline  100 mg Oral Q12H [facility] Eileen Gonzaleznathan, CRNP      nicotine polacrilex  4 mg Oral Q2H PRN Yasmin Harvey MD      ondansetron  4 mg Intravenous Once PRN Elisa Workman MD      ondansetron  4 mg Oral Q6H PRN Pia Mantilla, HOLLI      polyethylene glycol  17 g Oral Daily PRN Yasmin Harvey MD      polyethylene glycol  17 g Oral Daily Eileen Wilsonjaylen, STEVENP      propranolol  10 mg Oral Q12H [facility] Prisca Villafuerte, HOLLI      senna-docusate sodium  1 tablet Oral Daily PRN Yasmin Harvey MD      senna-docusate sodium  2 tablet Oral BID La Flores MD      traZODone  50 mg Oral HS PRN Yasmin Harvey MD      white petrolatum-mineral oil   Topical TID PRN Anjel Sagastume MD       Risks / Benefits of Treatment:    Discussed risks and benefits of Electroconvulsive Treatment with patient including risks of anesthesia and memory loss. Alberto verbalized understanding and agreed for ECT.    Counseling / Coordination of Care:    Patient's progress discussed with staff in treatment team meeting.  Medications, treatment progress and treatment plan reviewed  with patient.    HOLLI Anderson 03/13/24

## 2024-03-13 NOTE — NURSING NOTE
Patient returned to unit from ECT at this time. Patient alert and orientated x4. Patient VS WNL. No complaints of discomfort at this time.

## 2024-03-13 NOTE — NURSING NOTE
"Patient is isolative to room and self this morning. Patient calm and cooperative upon approach. Patient reported AH, voices stating, \"Im going to kill you.\" Patient denied any other psych symptoms. Patient verbalizes feeling safe on unit. Held propanolol 10 mg this morning, due to b/p not meeting parameters. Patient compliant with unit and meals.   "

## 2024-03-13 NOTE — ANESTHESIA POSTPROCEDURE EVALUATION
Post-Op Assessment Note    CV Status:  Stable    Pain management: adequate       Mental Status:  Alert and awake   Hydration Status:  Euvolemic   PONV Controlled:  Controlled   Airway Patency:  Patent     Post Op Vitals Reviewed: Yes    No anethesia notable event occurred.    Staff: CRNA               BP   166/90   Temp      Pulse  98   Resp   13   SpO2   97

## 2024-03-13 NOTE — PROCEDURES
Procedure Note - ECT  Alberto Berumen 28 y.o. male MRN: [id number]    Time out was taken with staff to confirm correct patient and correct procedure to be performed. Pt denied any side effects of the ECT. He does not feel better and agreed with increase of energy to 65%    Session Number: 007    Diagnosis: Principal Problem:    Schizoaffective disorder, bipolar type ([facility])  Active Problems:    GERD (gastroesophageal reflux disease)    Tobacco abuse    T wave inversion in EKG    Chronic idiopathic constipation    Vitamin B 12 deficiency    Vitamin D deficiency    Acanthosis nigricans    Rash    Class 2 obesity in adult      ECT Type: Inpatient, Acute    Anesthesia: Brevital     Electrode Placement: bilateral    Energy level: 65%      Seizure Duration     EE sec  EM sec    PSI 92%    Results: satisfactory. Procedure was tolerated well.     Vitals:    24 0605   BP: 158/90   Pulse: 93   Resp: 20   Temp:    SpO2: 96%        Medication Administration - last 24 hours from 2024 0652 to 2024 0652         Date/Time Order Dose Route Action Action by     2024 2125 EDT nicotine polacrilex (NICORETTE) gum 4 mg 4 mg Oral Given Lex Tao RN     2024 1911 EDT nicotine polacrilex (NICORETTE) gum 4 mg 4 mg Oral Given Lex Tao RN     2024 1708 EDT nicotine polacrilex (NICORETTE) gum 4 mg 4 mg Oral Given Lex Tao RN     2024 1414 EDT nicotine polacrilex (NICORETTE) gum 4 mg 4 mg Oral Given Verónica Bailey LPN     2024 210 EDT melatonin tablet 3 mg 3 mg Oral Given Lex Tao RN     2024 0802 EDT polyethylene glycol (MIRALAX) packet 17 g 17 g Oral Given Nikos Craft LPN     2024 0802 EDT escitalopram (LEXAPRO) tablet 20 mg 20 mg Oral Given Nikos Craft LPN     2024 0802 EDT metFORMIN (GLUCOPHAGE) tablet 500 mg 500 mg Oral Given Nikos Craft LPN     2024 210 EDT minocycline (MINOCIN) capsule 100 mg 100 mg Oral Given Lex  MIGUEL Tao     03/12/2024 0802 EDT minocycline (MINOCIN) capsule 100 mg 100 mg Oral Given Nikos Craft LPN     03/12/2024 2109 EDT propranolol (INDERAL) tablet 10 mg 10 mg Oral Given Lex Tao RN     03/12/2024 0802 EDT propranolol (INDERAL) tablet 10 mg 10 mg Oral Given Nikos Craft LPN     03/12/2024 2236 EDT atropine (ISOPTO ATROPINE) 1 % ophthalmic solution 1 drop 1 drop Sublingual Given Lex Tao RN     03/12/2024 1754 EDT senna-docusate sodium (SENOKOT S) 8.6-50 mg per tablet 2 tablet 2 tablet Oral Given Lex Tao RN     03/12/2024 0802 EDT senna-docusate sodium (SENOKOT S) 8.6-50 mg per tablet 2 tablet 2 tablet Oral Given Nikos Craft LPN     03/12/2024 2109 EDT cloZAPine (CLOZARIL) tablet 450 mg 450 mg Oral Given Lex Tao RN     03/13/2024 0610 EDT lactated ringers infusion 100 mL/hr Intravenous New Bag Cynthia Montenegro RN

## 2024-03-13 NOTE — ANESTHESIA PREPROCEDURE EVALUATION
Medical History    History Comments   Schizoaffective disorder (HCC)    GERD (gastroesophageal reflux disease)    T wave inversion in EKG    Syringoma    Chronic idiopathic constipation    Anemia      Procedure:  ELECTROCONVULSIVE THERAPY (ECT)    Relevant Problems   GI/HEPATIC   (+) GERD (gastroesophageal reflux disease)        Physical Exam    Airway    Mallampati score: II  TM Distance: >3 FB  Neck ROM: full     Dental       Cardiovascular  Rate: normal    Pulmonary  Pulmonary exam normal     Other Findings  Per pt denies anything remaining that is loose or removeable      Anesthesia Plan  ASA Score- 3     Anesthesia Type- general with ASA Monitors.         Additional Monitors:     Airway Plan:     Comment: MV  TWI evaluated and cleared by cardiology.       Plan Factors-Exercise tolerance (METS): >4 METS.    Chart reviewed. EKG reviewed.  Existing labs reviewed. Patient summary reviewed.    Patient is a current smoker.              Induction- intravenous.    Postoperative Plan-     Informed Consent- Anesthetic plan and risks discussed with patient.  I personally reviewed this patient with the CRNA. Discussed and agreed on the Anesthesia Plan with the CRNA..

## 2024-03-13 NOTE — PROGRESS NOTES
03/13/24 1048   Team Meeting   Meeting Type Daily Rounds   Team Members Present   Team Members Present Physician;Nurse;   Physician Team Member Jose Luis   Nursing Team Member Earl/Giancarlo   Care Management Team Member Quinn/Miguelito     201.  Denying SI/HI/VH.  C/o auditory hallucinations. Attending some groups.  Needs a repeat lipid panel (last completed 01/11).  D/C TBD

## 2024-03-13 NOTE — NURSING NOTE
Patient left for ECT around 0600 via wheelchair and . Vitals were WNL and there was no complaint of pain.

## 2024-03-14 LAB
CHOLEST SERPL-MCNC: 156 MG/DL
HDLC SERPL-MCNC: 44 MG/DL
LDLC SERPL CALC-MCNC: 85 MG/DL (ref 0–100)
NONHDLC SERPL-MCNC: 112 MG/DL
TRIGL SERPL-MCNC: 133 MG/DL

## 2024-03-14 PROCEDURE — 80061 LIPID PANEL: CPT | Performed by: NURSE PRACTITIONER

## 2024-03-14 PROCEDURE — 99232 SBSQ HOSP IP/OBS MODERATE 35: CPT | Performed by: STUDENT IN AN ORGANIZED HEALTH CARE EDUCATION/TRAINING PROGRAM

## 2024-03-14 RX ADMIN — MELATONIN TAB 3 MG 3 MG: 3 TAB at 21:02

## 2024-03-14 RX ADMIN — POLYETHYLENE GLYCOL 3350 17 G: 17 POWDER, FOR SOLUTION ORAL at 08:24

## 2024-03-14 RX ADMIN — SENNOSIDES AND DOCUSATE SODIUM 2 TABLET: 8.6; 5 TABLET ORAL at 17:35

## 2024-03-14 RX ADMIN — NICOTINE POLACRILEX 4 MG: 4 GUM, CHEWING ORAL at 15:32

## 2024-03-14 RX ADMIN — CLOZAPINE 450 MG: 25 TABLET ORAL at 21:04

## 2024-03-14 RX ADMIN — ESCITALOPRAM OXALATE 20 MG: 10 TABLET ORAL at 08:23

## 2024-03-14 RX ADMIN — PROPRANOLOL HYDROCHLORIDE 10 MG: 10 TABLET ORAL at 08:23

## 2024-03-14 RX ADMIN — METFORMIN HYDROCHLORIDE 500 MG: 500 TABLET ORAL at 08:24

## 2024-03-14 RX ADMIN — NICOTINE POLACRILEX 4 MG: 4 GUM, CHEWING ORAL at 17:36

## 2024-03-14 RX ADMIN — SENNOSIDES AND DOCUSATE SODIUM 2 TABLET: 8.6; 5 TABLET ORAL at 08:22

## 2024-03-14 RX ADMIN — NICOTINE POLACRILEX 4 MG: 4 GUM, CHEWING ORAL at 21:03

## 2024-03-14 RX ADMIN — PROPRANOLOL HYDROCHLORIDE 10 MG: 10 TABLET ORAL at 21:05

## 2024-03-14 RX ADMIN — NICOTINE POLACRILEX 4 MG: 4 GUM, CHEWING ORAL at 13:17

## 2024-03-14 RX ADMIN — MINOCYCLINE HYDROCHLORIDE 100 MG: 100 CAPSULE ORAL at 21:02

## 2024-03-14 RX ADMIN — MINOCYCLINE HYDROCHLORIDE 100 MG: 100 CAPSULE ORAL at 08:23

## 2024-03-14 RX ADMIN — ATROPINE SULFATE 1 DROP: 10 SOLUTION/ DROPS OPHTHALMIC at 21:05

## 2024-03-14 NOTE — NURSING NOTE
Patient isolative to room and self this am. Took all scheduled meds.  Continues to endorse auditory hallucinations. Denies SI/HI and visual hallucinations. Q7 min rounding maintained for safety.

## 2024-03-14 NOTE — NURSING NOTE
Patient periodically visible on unit.  Patient continues to endorse auditory hallucinations. He denies SI/HI and visual hallucinations.

## 2024-03-14 NOTE — PROGRESS NOTES
03/14/24 1536   Team Meeting   Meeting Type Daily Rounds   Team Members Present   Team Members Present Physician;Nurse;   Physician Team Member Jose Luis   Nursing Team Member Earl   Care Management Team Member Farley     Patient currently holds 201 status. Patient reports AH. Patient is medication and meal compliant. Patient to continue on all current scheduled medications. Patient discharge date and time is to be determined.

## 2024-03-14 NOTE — PROGRESS NOTES
Progress Note - Behavioral Health     Alberto Berumen 28 y.o. male MRN: [id number]   Unit/Bed#: [facility] 383-02 Encounter: [id number]    Documentation, nursing notes, medication reconciliation, labs, and vitals reviewed. Patient was seen for continuing care and reviewed with treatment team. No acute events over the past 24 hours.  Per nursing report, continues mostly isolative during the day, out of his room more in the evening.  Cooperative.  Continues to endorse auditory hallucinations.    No medication changes over the past 24 hours.  Continues ECT treatment, #8 on Friday.  Continues to tolerate current medications with no adverse effects.     On evaluation today, he is seen in his room and resting in bed, napping.  Difficult to arouse.  States he did have less body aches post-ECT yesterday, as he did receive IM Toradol after treatment.  Tells me today he does not feel any changes in his hallucinations since starting ECT.  Also still believes that there is someone or some agency behind the threats.  But cannot explain why that would be.  He is agreeable to continuing ECT #8 on the phone and #9 on Monday.  No suicidal ideation, plan, or intent. Ongoing perceptual disturbances and does not exhibit any symptoms of aimee on evaluation.    Psychiatric ROS:  Behavior over the last 24 hours: unchanged  Sleep: hypersomnia  Appetite: weight gain  Medication side effects: No   ROS: reports tiredness, all other systems are negative    Mental Status Evaluation:    Appearance:  marginal hygiene   Behavior:  cooperative   Speech:  scant   Mood:  depressed   Affect:  flat   Thought Process:  poverty of thought   Associations: concrete associations   Thought Content:  persecutory delusions   Perceptual Disturbances: auditory hallucinations   Risk Potential: Suicidal ideation - None  Homicidal ideation - None  Potential for aggression - No   Sensorium:  oriented to person, place, and time/date   Memory:  recent and remote memory  grossly intact   Consciousness:  alert and awake   Attention/Concentration: decreased concentration and decreased attention span   Insight:  limited   Judgment: limited   Gait/Station: normal gait/station, normal balance   Motor Activity: no abnormal movements     Vital signs in last 24 hours:    Temp:  [98.6 °F (37 °C)] 98.6 °F (37 °C)  HR:  [] 72  Resp:  [16-18] 18  BP: (121-142)/(76-83) 139/76    Laboratory results: I have personally reviewed all pertinent laboratory/tests results    Results from the past 24 hours:   Recent Results (from the past 24 hour(s))   Lipid panel    Collection Time: 03/14/24  6:40 AM   Result Value Ref Range    Cholesterol 156 See Comment mg/dL    Triglycerides 133 See Comment mg/dL    HDL, Direct 44 >=40 mg/dL    LDL Calculated 85 0 - 100 mg/dL    Non-HDL-Chol (CHOL-HDL) 112 mg/dl       Suicide/Homicide Risk Assessment:    Risk of Harm to Self:   Current Specific Risk Factors include: current depressive symptoms, presence of hallucinations  Protective Factors: no current suicidal ideation, ability to communicate with staff on the unit, able to contract for safety on the unit, taking medications as ordered on the unit  Based on today's assessment, Alberto presents the following risk of harm to self: minimal    Risk of Harm to Others:  Current Specific Risk Factors include: current psychotic symptoms  Protective Factors: no current homicidal ideation, psychotic symptoms are less prominent, compliant with medications on the unit as ordered  Based on today's assessment, Alberto presents the following risk of harm to others: minimal    The following interventions are recommended: behavioral checks every 7 minutes, continued hospitalization on locked unit    Progress Toward Goals: no significant improvement    Assessment/Plan   Principal Problem:    Schizoaffective disorder, bipolar type (HCC)  Active Problems:    GERD (gastroesophageal reflux disease)    Tobacco abuse    T wave  inversion in EKG    Chronic idiopathic constipation    Vitamin B 12 deficiency    Vitamin D deficiency    Acanthosis nigricans    Rash    Class 2 obesity in adult      Recommended Treatment:     Planned medication and treatment changes:    All current active medications have been reviewed  Encourage group therapy, milieu therapy and occupational therapy  Behavioral Health checks every 7 minutes  Consider Extended Acute Care referral  Continue ECT #8 tomorrow  Monitor weekly CBC for Clozaril  Continue current medications:    Current Facility-Administered Medications   Medication Dose Route Frequency Provider Last Rate    acetaminophen  650 mg Oral Q6H PRN Yasmin Harvey MD      acetaminophen  650 mg Oral Q4H PRN Yasmin Harvey MD      acetaminophen  975 mg Oral Q6H PRN Yasmin Harvey MD      aluminum-magnesium hydroxide-simethicone  30 mL Oral Q4H PRN Yasmin Harvey MD      atropine  1 drop Sublingual HS HOLLI Anderson      atropine  1 drop Sublingual Daily PRN HOLLI Anderson      haloperidol lactate  2.5 mg Intramuscular Q4H PRN Max 4/day Yasmin Harvey MD      And    LORazepam  1 mg Intramuscular Q4H PRN Max 4/day Yasmin Harvey MD      And    benztropine  0.5 mg Intramuscular Q4H PRN Max 4/day Yasmin Harvey MD      haloperidol lactate  5 mg Intramuscular Q4H PRN Max 4/day Yasmin Harvey MD      And    LORazepam  2 mg Intramuscular Q4H PRN Max 4/day Yasmin Harvey MD      And    benztropine  1 mg Intramuscular Q4H PRN Max 4/day Yasmin Harvey MD      benztropine  1 mg Oral Q4H PRN Max 6/day Yasmin Harvey MD      bisacodyl  10 mg Rectal Daily PRN Yasmin Harvey MD      cloZAPine  450 mg Oral HS HOLLI Anderson      hydrOXYzine HCL  50 mg Oral Q6H PRN Max 4/day Yasmin Harvey MD      Or    diphenhydrAMINE  50 mg Intramuscular Q6H PRN Yasmin Harvey MD      diphenhydrAMINE-zinc acetate   Topical BID PRN Anjel Sagastume MD      escitalopram  20 mg Oral Daily La  MD Mark      haloperidol  1 mg Oral Q6H PRN Yasmin Harvey MD      haloperidol  2.5 mg Oral Q4H PRN Max 4/day Yasmin Harvey MD      haloperidol  5 mg Oral Q4H PRN Max 4/day Yasmin Harvey MD      hydrALAZINE  5 mg Intravenous Q10 Min PRN Khoa Austin Shiraca, CRNA      hydrocortisone   Topical 4x Daily PRN HOLLI Monge      hydrOXYzine HCL  100 mg Oral Q6H PRN Max 4/day Yasmin Harvey MD      Or    LORazepam  2 mg Intramuscular Q6H PRN Yasmin Harvey MD      hydrOXYzine HCL  25 mg Oral Q6H PRN Max 4/day Yasmin Harvey MD      ibuprofen  600 mg Oral Q8H PRN Eileen Jensen, HOLLI      lactated ringers  100 mL/hr Intravenous Continuous Elisa Workman  mL/hr (03/11/24 0708)    lactated ringers  100 mL/hr Intravenous Continuous Elisa Workman MD      lactated ringers  100 mL/hr Intravenous Continuous Anjel Arriaza, CHRISTIAN      lactated ringers  100 mL/hr Intravenous Continuous Elisa Workman  mL/hr (03/13/24 0610)    melatonin  3 mg Oral HS Yasmin Harvey MD      metFORMIN  500 mg Oral Daily With Breakfast Eileen Jensen, HOLLI      methocarbamol  500 mg Oral Q6H PRN Eileen Jensne, HOLLI      minocycline  100 mg Oral Q12H [facility] HOLLI Monge      nicotine polacrilex  4 mg Oral Q2H PRN Yasmin Harvey MD      ondansetron  4 mg Oral Q6H PRN Pia Mantilla, HOLLI      polyethylene glycol  17 g Oral Daily PRN Yasmin Harvey MD      polyethylene glycol  17 g Oral Daily HOLLI Monge      propranolol  10 mg Oral Q12H [facility] Prisca Villafuerte, HOLLI      senna-docusate sodium  1 tablet Oral Daily PRN Yasmin Harvey MD      senna-docusate sodium  2 tablet Oral BID La Flores MD      traZODone  50 mg Oral HS PRN Yasmin Harvey MD      white petrolatum-mineral oil   Topical TID PRN Anjel Sagastume MD       Risks / Benefits of Treatment:    Risks, benefits, and possible side effects of medications  explained to patient and patient verbalizes understanding and agreement for treatment.  Discussed risks and benefits of Electroconvulsive Treatment with patient including risks of anesthesia and memory loss. Alberto verbalized understanding and agreed for ECT.    Counseling / Coordination of Care:    Patient's progress discussed with staff in treatment team meeting.  Medications, treatment progress and treatment plan reviewed with patient.    HOLLI Anderson 03/14/24

## 2024-03-15 ENCOUNTER — ANESTHESIA EVENT (INPATIENT)
Dept: PREOP/PACU | Facility: HOSPITAL | Age: 29
End: 2024-03-15
Payer: COMMERCIAL

## 2024-03-15 ENCOUNTER — APPOINTMENT (INPATIENT)
Dept: PREOP/PACU | Facility: HOSPITAL | Age: 29
DRG: 750 | End: 2024-03-15
Attending: STUDENT IN AN ORGANIZED HEALTH CARE EDUCATION/TRAINING PROGRAM
Payer: COMMERCIAL

## 2024-03-15 ENCOUNTER — ANESTHESIA (INPATIENT)
Dept: PREOP/PACU | Facility: HOSPITAL | Age: 29
End: 2024-03-15
Payer: COMMERCIAL

## 2024-03-15 PROCEDURE — 99232 SBSQ HOSP IP/OBS MODERATE 35: CPT | Performed by: STUDENT IN AN ORGANIZED HEALTH CARE EDUCATION/TRAINING PROGRAM

## 2024-03-15 PROCEDURE — 90870 ELECTROCONVULSIVE THERAPY: CPT

## 2024-03-15 PROCEDURE — 90870 ELECTROCONVULSIVE THERAPY: CPT | Performed by: STUDENT IN AN ORGANIZED HEALTH CARE EDUCATION/TRAINING PROGRAM

## 2024-03-15 PROCEDURE — GZB2ZZZ ELECTROCONVULSIVE THERAPY, BILATERAL-SINGLE SEIZURE: ICD-10-PCS | Performed by: STUDENT IN AN ORGANIZED HEALTH CARE EDUCATION/TRAINING PROGRAM

## 2024-03-15 RX ORDER — METOCLOPRAMIDE HYDROCHLORIDE 5 MG/ML
10 INJECTION INTRAMUSCULAR; INTRAVENOUS ONCE AS NEEDED
Status: DISCONTINUED | OUTPATIENT
Start: 2024-03-15 | End: 2024-03-15

## 2024-03-15 RX ORDER — ALBUTEROL SULFATE 2.5 MG/3ML
2.5 SOLUTION RESPIRATORY (INHALATION) ONCE AS NEEDED
Status: DISCONTINUED | OUTPATIENT
Start: 2024-03-15 | End: 2024-03-15 | Stop reason: HOSPADM

## 2024-03-15 RX ORDER — ESMOLOL HYDROCHLORIDE 10 MG/ML
INJECTION INTRAVENOUS AS NEEDED
Status: DISCONTINUED | OUTPATIENT
Start: 2024-03-15 | End: 2024-03-15

## 2024-03-15 RX ORDER — GLYCOPYRROLATE 0.2 MG/ML
INJECTION INTRAMUSCULAR; INTRAVENOUS AS NEEDED
Status: DISCONTINUED | OUTPATIENT
Start: 2024-03-15 | End: 2024-03-15

## 2024-03-15 RX ORDER — FENTANYL CITRATE/PF 50 MCG/ML
25 SYRINGE (ML) INJECTION
Status: DISCONTINUED | OUTPATIENT
Start: 2024-03-15 | End: 2024-03-15 | Stop reason: HOSPADM

## 2024-03-15 RX ORDER — SODIUM CHLORIDE, SODIUM LACTATE, POTASSIUM CHLORIDE, CALCIUM CHLORIDE 600; 310; 30; 20 MG/100ML; MG/100ML; MG/100ML; MG/100ML
125 INJECTION, SOLUTION INTRAVENOUS CONTINUOUS
Status: DISCONTINUED | OUTPATIENT
Start: 2024-03-15 | End: 2024-03-29 | Stop reason: HOSPADM

## 2024-03-15 RX ORDER — ONDANSETRON 2 MG/ML
4 INJECTION INTRAMUSCULAR; INTRAVENOUS ONCE AS NEEDED
Status: DISCONTINUED | OUTPATIENT
Start: 2024-03-15 | End: 2024-03-15 | Stop reason: HOSPADM

## 2024-03-15 RX ORDER — HYDRALAZINE HYDROCHLORIDE 20 MG/ML
5 INJECTION INTRAMUSCULAR; INTRAVENOUS
Status: DISCONTINUED | OUTPATIENT
Start: 2024-03-15 | End: 2024-03-15 | Stop reason: HOSPADM

## 2024-03-15 RX ORDER — METHOHEXITAL IN WATER/PF 100MG/10ML
SYRINGE (ML) INTRAVENOUS AS NEEDED
Status: DISCONTINUED | OUTPATIENT
Start: 2024-03-15 | End: 2024-03-15

## 2024-03-15 RX ORDER — SUCCINYLCHOLINE/SOD CL,ISO/PF 100 MG/5ML
SYRINGE (ML) INTRAVENOUS AS NEEDED
Status: DISCONTINUED | OUTPATIENT
Start: 2024-03-15 | End: 2024-03-15

## 2024-03-15 RX ORDER — METOCLOPRAMIDE HYDROCHLORIDE 5 MG/ML
10 INJECTION INTRAMUSCULAR; INTRAVENOUS ONCE AS NEEDED
Status: DISCONTINUED | OUTPATIENT
Start: 2024-03-15 | End: 2024-03-15 | Stop reason: HOSPADM

## 2024-03-15 RX ORDER — LABETALOL HYDROCHLORIDE 5 MG/ML
10 INJECTION, SOLUTION INTRAVENOUS
Status: DISCONTINUED | OUTPATIENT
Start: 2024-03-15 | End: 2024-03-15 | Stop reason: HOSPADM

## 2024-03-15 RX ORDER — PROMETHAZINE HYDROCHLORIDE 25 MG/ML
12.5 INJECTION, SOLUTION INTRAMUSCULAR; INTRAVENOUS ONCE AS NEEDED
Status: DISCONTINUED | OUTPATIENT
Start: 2024-03-15 | End: 2024-03-15 | Stop reason: HOSPADM

## 2024-03-15 RX ORDER — HYDROMORPHONE HCL/PF 1 MG/ML
0.5 SYRINGE (ML) INJECTION
Status: DISCONTINUED | OUTPATIENT
Start: 2024-03-15 | End: 2024-03-15 | Stop reason: HOSPADM

## 2024-03-15 RX ORDER — HYDROMORPHONE HCL IN WATER/PF 6 MG/30 ML
0.2 PATIENT CONTROLLED ANALGESIA SYRINGE INTRAVENOUS
Status: DISCONTINUED | OUTPATIENT
Start: 2024-03-15 | End: 2024-03-15 | Stop reason: HOSPADM

## 2024-03-15 RX ORDER — LABETALOL HYDROCHLORIDE 5 MG/ML
INJECTION, SOLUTION INTRAVENOUS AS NEEDED
Status: DISCONTINUED | OUTPATIENT
Start: 2024-03-15 | End: 2024-03-15

## 2024-03-15 RX ORDER — KETOROLAC TROMETHAMINE 30 MG/ML
INJECTION, SOLUTION INTRAMUSCULAR; INTRAVENOUS AS NEEDED
Status: DISCONTINUED | OUTPATIENT
Start: 2024-03-15 | End: 2024-03-15

## 2024-03-15 RX ORDER — LIDOCAINE HYDROCHLORIDE 10 MG/ML
0.5 INJECTION, SOLUTION EPIDURAL; INFILTRATION; INTRACAUDAL; PERINEURAL ONCE AS NEEDED
Status: DISCONTINUED | OUTPATIENT
Start: 2024-03-15 | End: 2024-03-29 | Stop reason: HOSPADM

## 2024-03-15 RX ADMIN — Medication 20 MG: at 07:12

## 2024-03-15 RX ADMIN — ESMOLOL HYDROCHLORIDE 30 MG: 100 INJECTION, SOLUTION INTRAVENOUS at 07:13

## 2024-03-15 RX ADMIN — POLYETHYLENE GLYCOL 3350 17 G: 17 POWDER, FOR SOLUTION ORAL at 08:25

## 2024-03-15 RX ADMIN — LABETALOL 20 MG/4 ML (5 MG/ML) INTRAVENOUS SYRINGE 10 MG: at 07:23

## 2024-03-15 RX ADMIN — ESMOLOL HYDROCHLORIDE 20 MG: 100 INJECTION, SOLUTION INTRAVENOUS at 07:10

## 2024-03-15 RX ADMIN — NICOTINE POLACRILEX 4 MG: 4 GUM, CHEWING ORAL at 21:47

## 2024-03-15 RX ADMIN — Medication 120 MG: at 07:10

## 2024-03-15 RX ADMIN — GLYCOPYRROLATE 0.4 MG: 0.2 INJECTION INTRAMUSCULAR; INTRAVENOUS at 07:00

## 2024-03-15 RX ADMIN — MINOCYCLINE HYDROCHLORIDE 100 MG: 100 CAPSULE ORAL at 21:15

## 2024-03-15 RX ADMIN — KETOROLAC TROMETHAMINE 30 MG: 30 INJECTION, SOLUTION INTRAMUSCULAR; INTRAVENOUS at 07:00

## 2024-03-15 RX ADMIN — PROPRANOLOL HYDROCHLORIDE 10 MG: 10 TABLET ORAL at 21:15

## 2024-03-15 RX ADMIN — MINOCYCLINE HYDROCHLORIDE 100 MG: 100 CAPSULE ORAL at 08:23

## 2024-03-15 RX ADMIN — PROPRANOLOL HYDROCHLORIDE 10 MG: 10 TABLET ORAL at 08:23

## 2024-03-15 RX ADMIN — ESCITALOPRAM OXALATE 20 MG: 10 TABLET ORAL at 08:23

## 2024-03-15 RX ADMIN — NICOTINE POLACRILEX 4 MG: 4 GUM, CHEWING ORAL at 13:04

## 2024-03-15 RX ADMIN — MELATONIN TAB 3 MG 3 MG: 3 TAB at 21:15

## 2024-03-15 RX ADMIN — ATROPINE SULFATE 1 DROP: 10 SOLUTION/ DROPS OPHTHALMIC at 21:15

## 2024-03-15 RX ADMIN — ACETAMINOPHEN 650 MG: 325 TABLET ORAL at 08:24

## 2024-03-15 RX ADMIN — Medication 140 MG: at 07:11

## 2024-03-15 RX ADMIN — NICOTINE POLACRILEX 4 MG: 4 GUM, CHEWING ORAL at 19:53

## 2024-03-15 RX ADMIN — LABETALOL 20 MG/4 ML (5 MG/ML) INTRAVENOUS SYRINGE 10 MG: at 07:10

## 2024-03-15 RX ADMIN — CLOZAPINE 450 MG: 25 TABLET ORAL at 21:15

## 2024-03-15 RX ADMIN — SENNOSIDES AND DOCUSATE SODIUM 2 TABLET: 8.6; 5 TABLET ORAL at 17:52

## 2024-03-15 RX ADMIN — SENNOSIDES AND DOCUSATE SODIUM 2 TABLET: 8.6; 5 TABLET ORAL at 08:23

## 2024-03-15 RX ADMIN — NICOTINE POLACRILEX 4 MG: 4 GUM, CHEWING ORAL at 17:51

## 2024-03-15 RX ADMIN — LABETALOL 20 MG/4 ML (5 MG/ML) INTRAVENOUS SYRINGE 10 MG: at 07:13

## 2024-03-15 NOTE — ANESTHESIA POSTPROCEDURE EVALUATION
Post-Op Assessment Note    CV Status:  Stable  Pain Score: 0    Pain management: adequate       Mental Status:  Alert and awake   Hydration Status:  Euvolemic   PONV Controlled:  Controlled   Airway Patency:  Patent     Post Op Vitals Reviewed: Yes    No anethesia notable event occurred.    Staff: CRNA, Anesthesiologist               BP      Temp      Pulse  97   Resp   20   SpO2   96

## 2024-03-15 NOTE — PROGRESS NOTES
03/15/24 0958   Team Meeting   Meeting Type Daily Rounds   Initial Conference Date 03/15/24   Next Conference Date 03/18/24   Team Members Present   Team Members Present Physician;Nurse;   Physician Team Member Dr Amaya, BRITNEY Zhang M. Noonan   Nursing Team Member Wellington   Care Management Team Member Minerva   Patient/Family Present   Patient Present No   Patient's Family Present No     ECT #8 today, reports a moderate headache after ECT, tylenol given, AH mild, discharge pending.

## 2024-03-15 NOTE — NURSING NOTE
"Patient denies any SI, HI, and VH's.  Patient states AH's, stated \"they are going to kill him and his family.\"  Denies anxiety and depression.  Patient is medication compliant.  Skipped breakfast.  Denies and needs at this time.  "

## 2024-03-15 NOTE — PLAN OF CARE
Problem: Ineffective Coping  Goal: Participates in unit activities  Description: Interventions:  - Provide therapeutic environment   - Provide required programming   - Redirect inappropriate behaviors   Outcome: Progressing     Problem: Depression  Goal: Treatment Goal: Demonstrate behavioral control of depressive symptoms, verbalize feelings of improved mood/affect, and adopt new coping skills prior to discharge  Outcome: Progressing  Goal: Verbalize thoughts and feelings  Description: Interventions:  - Assess and re-assess patient's level of risk   - Engage patient in 1:1 interactions, daily, for a minimum of 15 minutes   - Encourage patient to express feelings, fears, frustrations, hopes   Outcome: Progressing  Goal: Refrain from isolation  Description: Interventions:  - Develop a trusting relationship   - Encourage socialization   Outcome: Progressing  Goal: Refrain from self-neglect  Outcome: Progressing     Problem: Electroconvulsive therapy (ECT)  Goal: Treatment Goal: Demonstrate a reduction of confusion and improved orientation to person, place, time and/or situation upon discharge, according to optimum baseline/ability  Outcome: Progressing  Goal: Verbalizes/displays adequate comfort level or baseline comfort level  Description: Interventions:  - Encourage patient to monitor pain and request assistance  - Assess pain using appropriate pain scale  - Administer analgesics based on type and severity of pain and evaluate response  - Implement non-pharmacological measures as appropriate and evaluate response  - Consider cultural and social influences on pain and pain management  - Notify physician/advanced practitioner if interventions unsuccessful or patient reports new pain  Outcome: Progressing  Goal: Achieves stable or improved neurological status  Description: INTERVENTIONS  - Monitor and report changes in neurological status  - Monitor vital signs such as temperature, blood pressure, glucose, and any  other labs ordered   - Initiate measures to prevent increased intracranial pressure  - Monitor for seizure activity and implement precautions if appropriate      Outcome: Progressing  Goal: Achieves maximal functionality and self care  Description: INTERVENTIONS  - Monitor swallowing and airway patency with patient fatigue and changes in neurological status  - Encourage and assist patient to increase activity and self care.   - Encourage visually impaired, hearing impaired and aphasic patients to use assistive/communication devices  Outcome: Progressing  Goal: Maintain or return mobility to safest level of function  Description: INTERVENTIONS:  - Assess patient's ability to carry out ADLs; assess patient's baseline for ADL function and identify physical deficits which impact ability to perform ADLs (bathing, care of mouth/teeth, toileting, grooming, dressing, etc.)  - Assess/evaluate cause of self-care deficits   - Assess range of motion  - Assess patient's mobility  - Assess patient's need for assistive devices and provide as appropriate  - Encourage maximum independence but intervene and supervise when necessary  - Involve family in performance of ADLs  - Assess for home care needs following discharge   - Consider OT consult to assist with ADL evaluation and planning for discharge  - Provide patient education as appropriate  Outcome: Progressing  Goal: Absence of urinary retention  Description: INTERVENTIONS:  - Assess patient’s ability to void and empty bladder  - Monitor I/O  - Bladder scan as needed  - Discuss with physician/AP medications to alleviate retention as needed  - Discuss catheterization for long term situations as appropriate  Outcome: Progressing  Goal: Minimal or absence of nausea and/or vomiting  Description: INTERVENTIONS:  - Administer IV fluids if ordered to ensure adequate hydration  - Maintain NPO status until nausea and vomiting are resolved  - Nasogastric tube if ordered  - Administer  ordered antiemetic medications as needed  - Provide nonpharmacologic comfort measures as appropriate  - Advance diet as tolerated, if ordered  - Consider nutrition services referral to assist patient with adequate nutrition and appropriate food choices  Outcome: Progressing  Goal: Maintains adequate nutritional intake  Description: INTERVENTIONS:  - Monitor percentage of each meal consumed  - Identify factors contributing to decreased intake, treat as appropriate  - Assist with meals as needed  - Monitor I&O, weight, and lab values if indicated  - Obtain nutrition services referral as needed  Outcome: Progressing

## 2024-03-15 NOTE — PROGRESS NOTES
"Progress Note - Behavioral Health     Alberto Berumen 28 y.o. male MRN: [id number]   Unit/Bed#: [facility] 383-02 Encounter: [id number]    Documentation, nursing notes, medication reconciliation, labs, and vitals reviewed. Patient was seen for continuing care and reviewed with treatment team. No acute events over the past 24 hours.  Per nursing report, patient did report to staff auditory hallucinations were mild. \"But always there\".  Isolates to his room and napping during the day.  More social in the evening.    No medication changes over the past 24 hours. Continues to tolerate current medications with no adverse effects.  Had ECT #8 today, complained of headache following\".    On evaluation today, seen in his room this morning post ECT.  Minimally verbal.  \"Not sure \"if any improvements in auditory hallucinations.  Voices still threaten him and his family.  He still believes there is some person or government agency behind these threatening hallucinations.  Still complains of tiredness throughout the day.  Poor motivation.  Minimal ADLs..  No suicidal ideation, plan, or intent. Decreased perceptual disturbances and does not exhibit any symptoms of aimee on evaluation.    Psychiatric ROS:  Behavior over the last 24 hours: unchanged  Sleep: hypersomnia  Appetite: fair  Medication side effects: Yes - tiredness    ROS: reports body aches and headache, all other systems are negative    Mental Status Evaluation:    Appearance:  marginal hygiene   Behavior:  cooperative   Speech:  scant   Mood:  dysphoric   Affect:  constricted   Thought Process:  decreased rate of thoughts   Associations: concrete associations   Thought Content:  persecutory delusions   Perceptual Disturbances: auditory hallucinations   Risk Potential: Suicidal ideation - None  Homicidal ideation - None  Potential for aggression - No   Sensorium:  oriented to person, place, time/date, and situation   Memory:  recent and remote memory grossly intact "   Consciousness:  alert and awake   Attention/Concentration: decreased concentration and decreased attention span   Insight:  limited   Judgment: limited   Gait/Station: normal gait/station, normal balance   Motor Activity: no abnormal movements     Vital signs in last 24 hours:    Temp:  [97.7 °F (36.5 °C)-98.4 °F (36.9 °C)] 98.3 °F (36.8 °C)  HR:  [] 86  Resp:  [13-20] 18  BP: (110-169)/() 110/65    Laboratory results: I have personally reviewed all pertinent laboratory/tests results    Results from the past 24 hours: No results found for this or any previous visit (from the past 24 hour(s)).    Suicide/Homicide Risk Assessment:    Risk of Harm to Self:   Current Specific Risk Factors include: current depressive symptoms, current psychotic symptoms, presence of hallucinations  Protective Factors: no current suicidal ideation, ability to communicate with staff on the unit, able to contract for safety on the unit, taking medications as ordered on the unit  Based on today's assessment, Alberto presents the following risk of harm to self: minimal    Risk of Harm to Others:  Current Specific Risk Factors include: current psychotic symptoms  Protective Factors: no current homicidal ideation, psychotic symptoms are less prominent, compliant with medications on the unit as ordered  Based on today's assessment, Alberto presents the following risk of harm to others: minimal    The following interventions are recommended: behavioral checks every 7 minutes, continued hospitalization on locked unit    Progress Toward Goals: minimal improvement, still depressed, still psychotic    Assessment/Plan   Principal Problem:    Schizoaffective disorder, bipolar type (HCC)  Active Problems:    GERD (gastroesophageal reflux disease)    Tobacco abuse    T wave inversion in EKG    Chronic idiopathic constipation    Vitamin B 12 deficiency    Vitamin D deficiency    Acanthosis nigricans    Rash    Class 2 obesity in  adult      Recommended Treatment:     Planned medication and treatment changes:    All current active medications have been reviewed  Encourage group therapy, milieu therapy and occupational therapy  Behavioral Health checks every 7 minutes  Consider Extended Acute Care referral  Continue ECT #9 on Monday  Monitor weekly CBC for Clozaril  Continue current medications:    Current Facility-Administered Medications   Medication Dose Route Frequency Provider Last Rate    acetaminophen  650 mg Oral Q6H PRN Yasmin Harvey MD      acetaminophen  650 mg Oral Q4H PRN Yasmin Harvey MD      acetaminophen  975 mg Oral Q6H PRN Yasmin Harvey MD      aluminum-magnesium hydroxide-simethicone  30 mL Oral Q4H PRN Yasmin Harvey MD      atropine  1 drop Sublingual HS HOLLI Anderson      atropine  1 drop Sublingual Daily PRN HOLLI Anderson      haloperidol lactate  2.5 mg Intramuscular Q4H PRN Max 4/day Yasmin Harvey MD      And    LORazepam  1 mg Intramuscular Q4H PRN Max 4/day Yasmin Harvey MD      And    benztropine  0.5 mg Intramuscular Q4H PRN Max 4/day Yasmin Harvey MD      haloperidol lactate  5 mg Intramuscular Q4H PRN Max 4/day Yasmin Harvey MD      And    LORazepam  2 mg Intramuscular Q4H PRN Max 4/day Yasmin Harvey MD      And    benztropine  1 mg Intramuscular Q4H PRN Max 4/day Yasmin Harvey MD      benztropine  1 mg Oral Q4H PRN Max 6/day Yasmin Harvey MD      bisacodyl  10 mg Rectal Daily PRN Yasmin Harvey MD      cloZAPine  450 mg Oral HS HOLLI Anderson      hydrOXYzine HCL  50 mg Oral Q6H PRN Max 4/day Yasmin Harvey MD      Or    diphenhydrAMINE  50 mg Intramuscular Q6H PRN Yasmin Harvey MD      diphenhydrAMINE-zinc acetate   Topical BID PRN Anjel Sagastume MD      escitalopram  20 mg Oral Daily La Flores MD      haloperidol  1 mg Oral Q6H PRN Yasmin Harvey MD      haloperidol  2.5 mg Oral Q4H PRN Max 4/day Yasmin Harvey MD      haloperidol   5 mg Oral Q4H PRN Max 4/day Yasmin Harvey MD      hydrALAZINE  5 mg Intravenous Q10 Min PRN Khoa Gutierrez CRNA      hydrocortisone   Topical 4x Daily PRN HOLLI Monge      hydrOXYzine HCL  100 mg Oral Q6H PRN Max 4/day Yasmin Harvey MD      Or    LORazepam  2 mg Intramuscular Q6H PRN Yasmin Harvey MD      hydrOXYzine HCL  25 mg Oral Q6H PRN Max 4/day Yasmin Harvey MD      ibuprofen  600 mg Oral Q8H PRN HOLLI Monge      lactated ringers  100 mL/hr Intravenous Continuous Elisa Workman  mL/hr (03/15/24 0700)    lactated ringers  100 mL/hr Intravenous Continuous Elisa Workman MD      lactated ringers  125 mL/hr Intravenous Continuous Vikas Madison MD Stopped (03/15/24 0756)    lactated ringers  100 mL/hr Intravenous Continuous Anjel Arriaza CRNA      lactated ringers  100 mL/hr Intravenous Continuous Elisa Workman  mL/hr (03/13/24 0610)    lactated ringers  125 mL/hr Intravenous Continuous Anthony Fontana MD Stopped (03/15/24 0756)    lidocaine (PF)  0.5 mL Infiltration Once PRN Anthony Fontana MD      melatonin  3 mg Oral HS Yasmin Harvey MD      metFORMIN  500 mg Oral Daily With Breakfast HOLLI Monge      methocarbamol  500 mg Oral Q6H PRN HOLLI Monge      minocycline  100 mg Oral Q12H KAYLIE HOLLI Monge      nicotine polacrilex  4 mg Oral Q2H PRN Yasmin Harvey MD      ondansetron  4 mg Oral Q6H PRN HOLLI Galvan      polyethylene glycol  17 g Oral Daily PRN Yasmin Harvey MD      polyethylene glycol  17 g Oral Daily HOLLI Monge      propranolol  10 mg Oral Q12H KAYLIE HOLLI Anderson      senna-docusate sodium  1 tablet Oral Daily PRN Yasmin Harvey MD      senna-docusate sodium  2 tablet Oral BID La Flores MD      traZODone  50 mg Oral HS PRN Yasmin Harvey MD      white petrolatum-mineral oil   Topical TID PRN Anjel Sagastume MD        Risks / Benefits of Treatment:    Risks, benefits, and possible side effects of medications explained to patient and patient verbalizes understanding and agreement for treatment.  Discussed risks and benefits of Electroconvulsive Treatment with patient including risks of anesthesia and memory loss. Alberto verbalized understanding and agreed for ECT.    Counseling / Coordination of Care:    Patient's progress discussed with staff in treatment team meeting.  Medications, treatment progress and treatment plan reviewed with patient.    HOLLI Anderson 03/15/24

## 2024-03-15 NOTE — NURSING NOTE
"Patient is in the community and social with peers. Patient endorses AH, but \"always there.\" Patient denies SI/HI. Patient is compliant with scheduled medications. Q7 observation maintained.   "

## 2024-03-15 NOTE — NURSING NOTE
Patient arrived back from ECT. He is alert and oriented x4. Patient reports moderate headache for which he received Tylenol.  No other complaints.

## 2024-03-15 NOTE — NURSING NOTE
Chronic back pain. Patient has known wedge fracture in Thoracic spine, recently seen on x-ray.  Patient states that Percocet 10 mg tablets, 4 times daily, are not effective at controlling his pain. He is requesting to increase Percocet to 5 times daily.  Current pain control: good, 4/10  Adverse effects: none.  Physical functioning: good  Mood: fair  Family and social relationships: fair  Sleep pattern: good  Overall functioning: fair  Nonnarcotic treatments that are being used: nothing right now, has tried topical OTC pain relievers    Pain management agreement initiated and signed on: January 2018  Last dose of narcotic medication: this morning  Most recent urine drug screen done October 2017, which was reviewed today and consistent. No longer on Xanax, weaned off.  Aberrant Behaviors: None  I have reviewed the medical records, the Prescription Monitoring Program and I have determined that percocet 10, 5 times daily, is medically indicated.    I have advised patient to keep medication in a safe place and to not drive with medication.   Patient was transported to [facility] by [facility] and security at 0535 without issue.

## 2024-03-15 NOTE — PROCEDURES
Procedure Note - ECT  Alberto Berumen 28 y.o. male MRN: [id number]    Time out was taken with staff to confirm correct patient and correct procedure to be performed. The patient complained of previous short term memory loss related to remembering his breakfast orders and some names but otherwise no other major memory deficits. He is agreeable to proceed with ECT treatment today    Session Number: 008    Diagnosis: Principal Problem:    Schizoaffective disorder, bipolar type (HCC)  Active Problems:    GERD (gastroesophageal reflux disease)    Tobacco abuse    T wave inversion in EKG    Chronic idiopathic constipation    Vitamin B 12 deficiency    Vitamin D deficiency    Acanthosis nigricans    Rash    Class 2 obesity in adult      ECT Type: Inpatient    Anesthesia: Brevital    Electrode Placement: Bilateral    Energy level:  70 %      Seizure Duration     EE Sec.    EMG : 30 Sec    Post-ictal Suppression Index: Not available     Results:Clinical seizure was satisfactory, Patient tolerated ECT well    Vitals:    03/15/24 0745   BP: 147/100   Pulse: 88   Resp: 20   Temp:    SpO2: 95%        Medication Administration - last 24 hours from 2024 0746 to 03/15/2024 0746         Date/Time Order Dose Route Action Action by     2024 2103 EDT nicotine polacrilex (NICORETTE) gum 4 mg 4 mg Oral Given Svetlana Botello RN     2024 1736 EDT nicotine polacrilex (NICORETTE) gum 4 mg 4 mg Oral Given Svetlana Botello RN     2024 1532 EDT nicotine polacrilex (NICORETTE) gum 4 mg 4 mg Oral Given Svetlana Botello RN     2024 1317 EDT nicotine polacrilex (NICORETTE) gum 4 mg 4 mg Oral Given Kassi Elliott RN     2024 2102 EDT melatonin tablet 3 mg 3 mg Oral Given Svetlana Botello RN     2024 0824 EDT polyethylene glycol (MIRALAX) packet 17 g 17 g Oral Given Rebeka Capone LPN     2024 0823 EDT escitalopram (LEXAPRO) tablet 20 mg 20 mg Oral Given Rebeka Capone LPN     2024 0824 EDT  metFORMIN (GLUCOPHAGE) tablet 500 mg 500 mg Oral Given Tumorra Liborio, LPN     03/14/2024 2102 EDT minocycline (MINOCIN) capsule 100 mg 100 mg Oral Given Svetlana Botello RN     03/14/2024 0823 EDT minocycline (MINOCIN) capsule 100 mg 100 mg Oral Given Tumorra Liborio, LPN     03/14/2024 2105 EDT propranolol (INDERAL) tablet 10 mg 10 mg Oral Given Svetlana Botello RN     03/14/2024 0823 EDT propranolol (INDERAL) tablet 10 mg 10 mg Oral Given Tumorra Liborio, LPN     03/14/2024 2105 EDT atropine (ISOPTO ATROPINE) 1 % ophthalmic solution 1 drop 1 drop Sublingual Given Svetlana Botello RN     03/14/2024 1735 EDT senna-docusate sodium (SENOKOT S) 8.6-50 mg per tablet 2 tablet 2 tablet Oral Given Svetlana Botello RN     03/14/2024 0822 EDT senna-docusate sodium (SENOKOT S) 8.6-50 mg per tablet 2 tablet 2 tablet Oral Given Tumorodin DealLiborio, LPN     03/14/2024 2104 EDT cloZAPine (CLOZARIL) tablet 450 mg 450 mg Oral Given Svetlana Botello RN     03/15/2024 0700 EDT lactated ringers infusion -- Intravenous Continue from Pre-op Johnson Rosado, CRNA

## 2024-03-15 NOTE — ANESTHESIA PREPROCEDURE EVALUATION
Procedure:  ELECTROCONVULSIVE THERAPY (ECT)    Relevant Problems   No relevant active problems        Physical Exam    Airway    Mallampati score: III  TM Distance: >3 FB  Neck ROM: full     Dental   No notable dental hx     Cardiovascular      Pulmonary      Other Findings        Anesthesia Plan  ASA Score- 3     Anesthesia Type- general with ASA Monitors.         Additional Monitors:     Airway Plan:            Plan Factors-Exercise tolerance (METS): >4 METS.    Chart reviewed.    Patient summary reviewed.    Patient is not a current smoker.              Induction- intravenous.    Postoperative Plan-     Informed Consent- Anesthetic plan and risks discussed with patient.  I personally reviewed this patient with the CRNA. Discussed and agreed on the Anesthesia Plan with the CRNA..

## 2024-03-16 PROCEDURE — 99232 SBSQ HOSP IP/OBS MODERATE 35: CPT | Performed by: STUDENT IN AN ORGANIZED HEALTH CARE EDUCATION/TRAINING PROGRAM

## 2024-03-16 RX ADMIN — NICOTINE POLACRILEX 4 MG: 4 GUM, CHEWING ORAL at 21:33

## 2024-03-16 RX ADMIN — NICOTINE POLACRILEX 4 MG: 4 GUM, CHEWING ORAL at 18:13

## 2024-03-16 RX ADMIN — POLYETHYLENE GLYCOL 3350 17 G: 17 POWDER, FOR SOLUTION ORAL at 10:04

## 2024-03-16 RX ADMIN — CLOZAPINE 450 MG: 25 TABLET ORAL at 21:33

## 2024-03-16 RX ADMIN — PROPRANOLOL HYDROCHLORIDE 10 MG: 10 TABLET ORAL at 10:03

## 2024-03-16 RX ADMIN — PROPRANOLOL HYDROCHLORIDE 10 MG: 10 TABLET ORAL at 21:33

## 2024-03-16 RX ADMIN — NICOTINE POLACRILEX 4 MG: 4 GUM, CHEWING ORAL at 12:28

## 2024-03-16 RX ADMIN — NICOTINE POLACRILEX 4 MG: 4 GUM, CHEWING ORAL at 16:07

## 2024-03-16 RX ADMIN — ATROPINE SULFATE 1 DROP: 10 SOLUTION/ DROPS OPHTHALMIC at 21:33

## 2024-03-16 RX ADMIN — MELATONIN TAB 3 MG 3 MG: 3 TAB at 21:33

## 2024-03-16 RX ADMIN — MINOCYCLINE HYDROCHLORIDE 100 MG: 100 CAPSULE ORAL at 10:04

## 2024-03-16 RX ADMIN — SENNOSIDES AND DOCUSATE SODIUM 2 TABLET: 8.6; 5 TABLET ORAL at 17:08

## 2024-03-16 RX ADMIN — SENNOSIDES AND DOCUSATE SODIUM 2 TABLET: 8.6; 5 TABLET ORAL at 10:03

## 2024-03-16 RX ADMIN — MINOCYCLINE HYDROCHLORIDE 100 MG: 100 CAPSULE ORAL at 21:33

## 2024-03-16 RX ADMIN — ESCITALOPRAM OXALATE 20 MG: 10 TABLET ORAL at 10:04

## 2024-03-16 NOTE — NURSING NOTE
"Patient denies any SI, HI, and VH's.  Patient states Ah's, stated \"they are going to kill him.\"  Denies anxiety and depression.  Patient is medication compliant.  Patient skipped breakfast.  Denies and needs at this time.  "

## 2024-03-16 NOTE — NURSING NOTE
Patient is in the community and social with peers. Patient denies all psych symptoms at this time. Patient is compliant with scheduled medications. Q7 observation maintained.

## 2024-03-17 PROCEDURE — 99232 SBSQ HOSP IP/OBS MODERATE 35: CPT | Performed by: STUDENT IN AN ORGANIZED HEALTH CARE EDUCATION/TRAINING PROGRAM

## 2024-03-17 RX ORDER — LIDOCAINE HYDROCHLORIDE 10 MG/ML
0.5 INJECTION, SOLUTION EPIDURAL; INFILTRATION; INTRACAUDAL; PERINEURAL ONCE AS NEEDED
OUTPATIENT
Start: 2024-03-17

## 2024-03-17 RX ORDER — ALBUTEROL SULFATE 2.5 MG/3ML
2.5 SOLUTION RESPIRATORY (INHALATION) ONCE AS NEEDED
OUTPATIENT
Start: 2024-03-17

## 2024-03-17 RX ORDER — ONDANSETRON 2 MG/ML
4 INJECTION INTRAMUSCULAR; INTRAVENOUS ONCE AS NEEDED
OUTPATIENT
Start: 2024-03-17

## 2024-03-17 RX ADMIN — MINOCYCLINE HYDROCHLORIDE 100 MG: 100 CAPSULE ORAL at 21:00

## 2024-03-17 RX ADMIN — NICOTINE POLACRILEX 4 MG: 4 GUM, CHEWING ORAL at 15:49

## 2024-03-17 RX ADMIN — ESCITALOPRAM OXALATE 20 MG: 10 TABLET ORAL at 11:37

## 2024-03-17 RX ADMIN — MINOCYCLINE HYDROCHLORIDE 100 MG: 100 CAPSULE ORAL at 11:37

## 2024-03-17 RX ADMIN — SENNOSIDES AND DOCUSATE SODIUM 2 TABLET: 8.6; 5 TABLET ORAL at 17:23

## 2024-03-17 RX ADMIN — NICOTINE POLACRILEX 4 MG: 4 GUM, CHEWING ORAL at 13:18

## 2024-03-17 RX ADMIN — POLYETHYLENE GLYCOL 3350 17 G: 17 POWDER, FOR SOLUTION ORAL at 11:36

## 2024-03-17 RX ADMIN — CLOZAPINE 450 MG: 25 TABLET ORAL at 21:00

## 2024-03-17 RX ADMIN — NICOTINE POLACRILEX 4 MG: 4 GUM, CHEWING ORAL at 20:59

## 2024-03-17 RX ADMIN — SENNOSIDES AND DOCUSATE SODIUM 2 TABLET: 8.6; 5 TABLET ORAL at 11:36

## 2024-03-17 RX ADMIN — NICOTINE POLACRILEX 4 MG: 4 GUM, CHEWING ORAL at 11:11

## 2024-03-17 RX ADMIN — MELATONIN TAB 3 MG 3 MG: 3 TAB at 21:00

## 2024-03-17 RX ADMIN — PROPRANOLOL HYDROCHLORIDE 10 MG: 10 TABLET ORAL at 21:00

## 2024-03-17 RX ADMIN — NICOTINE POLACRILEX 4 MG: 4 GUM, CHEWING ORAL at 18:25

## 2024-03-17 RX ADMIN — PROPRANOLOL HYDROCHLORIDE 10 MG: 10 TABLET ORAL at 11:40

## 2024-03-17 RX ADMIN — ATROPINE SULFATE 1 DROP: 10 SOLUTION/ DROPS OPHTHALMIC at 21:00

## 2024-03-17 NOTE — PROGRESS NOTES
Progress Note - Behavioral Health   Alberto Berumen 28 y.o. male MRN: [id number]  [facility] 383[facility] Encounter: [id number]    Assessment/Plan   Principal Problem:    Schizoaffective disorder, bipolar type (HCC)  Active Problems:    GERD (gastroesophageal reflux disease)    Tobacco abuse    T wave inversion in EKG    Chronic idiopathic constipation    Vitamin B 12 deficiency    Vitamin D deficiency    Acanthosis nigricans    Rash    Class 2 obesity in adult      Behavior over the last 24 hours:  unchanged  Sleep: normal  Appetite: normal  Medication side effects: No  ROS: all other systems are negative    Subjective: Patient was seen and examined today at the bedside during morning rounds.  He is calm and cooperative and social with select peers.  He continued to express his depressive feelings and reports continued auditory hallucinations.  Still motivated to continue with ECT treatments as planned and scheduled    Mental Status Evaluation:  Appearance:  bearded and casually dressed   Behavior:  guarded   Speech:  normal pitch   Mood:  dysthymic   Affect:  constricted   Thought Process:  circumstantial   Associations: circumstantial associations   Thought Content:  normal   Perceptual Disturbances: Auditory hallucinations with commands to hurt others   Risk Potential: Suicidal Ideations none  Homicidal Ideations without plan  Potential for Aggression No   Sensorium:  person, place, and time/date   Memory:  recent and remote memory: unable to assess due to lack of cooperation   Consciousness:  alert and awake    Attention: attention span appeared shorter than expected for age   Insight:  limited   Judgment: limited   Gait/Station: normal gait/station   Motor Activity: no abnormal movements     Progress Toward Goals: Continue ECT treatment    Recommended Treatment: Continue with group therapy, milieu therapy and occupational therapy.      Risks, benefits and possible side effects of Medications:   Risks, benefits,  and possible side effects of medications explained to patient and patient verbalizes understanding.      Medications: all current active meds have been reviewed, continue current psychiatric medications, and current meds:   Current Facility-Administered Medications   Medication Dose Route Frequency    acetaminophen (TYLENOL) tablet 650 mg  650 mg Oral Q6H PRN    acetaminophen (TYLENOL) tablet 650 mg  650 mg Oral Q4H PRN    acetaminophen (TYLENOL) tablet 975 mg  975 mg Oral Q6H PRN    aluminum-magnesium hydroxide-simethicone (MAALOX) oral suspension 30 mL  30 mL Oral Q4H PRN    atropine (ISOPTO ATROPINE) 1 % ophthalmic solution 1 drop  1 drop Sublingual HS    atropine (ISOPTO ATROPINE) 1 % ophthalmic solution 1 drop  1 drop Sublingual Daily PRN    haloperidol lactate (HALDOL) injection 2.5 mg  2.5 mg Intramuscular Q4H PRN Max 4/day    And    LORazepam (ATIVAN) injection 1 mg  1 mg Intramuscular Q4H PRN Max 4/day    And    benztropine (COGENTIN) injection 0.5 mg  0.5 mg Intramuscular Q4H PRN Max 4/day    haloperidol lactate (HALDOL) injection 5 mg  5 mg Intramuscular Q4H PRN Max 4/day    And    LORazepam (ATIVAN) injection 2 mg  2 mg Intramuscular Q4H PRN Max 4/day    And    benztropine (COGENTIN) injection 1 mg  1 mg Intramuscular Q4H PRN Max 4/day    benztropine (COGENTIN) tablet 1 mg  1 mg Oral Q4H PRN Max 6/day    bisacodyl (DULCOLAX) rectal suppository 10 mg  10 mg Rectal Daily PRN    cloZAPine (CLOZARIL) tablet 450 mg  450 mg Oral HS    hydrOXYzine HCL (ATARAX) tablet 50 mg  50 mg Oral Q6H PRN Max 4/day    Or    diphenhydrAMINE (BENADRYL) injection 50 mg  50 mg Intramuscular Q6H PRN    diphenhydrAMINE-zinc acetate (BENADRYL) 2-0.1 % cream   Topical BID PRN    escitalopram (LEXAPRO) tablet 20 mg  20 mg Oral Daily    haloperidol (HALDOL) tablet 1 mg  1 mg Oral Q6H PRN    haloperidol (HALDOL) tablet 2.5 mg  2.5 mg Oral Q4H PRN Max 4/day    haloperidol (HALDOL) tablet 5 mg  5 mg Oral Q4H PRN Max 4/day    hydrALAZINE  (APRESOLINE) injection 5 mg  5 mg Intravenous Q10 Min PRN    hydrocortisone 1 % ointment   Topical 4x Daily PRN    hydrOXYzine HCL (ATARAX) tablet 100 mg  100 mg Oral Q6H PRN Max 4/day    Or    LORazepam (ATIVAN) injection 2 mg  2 mg Intramuscular Q6H PRN    hydrOXYzine HCL (ATARAX) tablet 25 mg  25 mg Oral Q6H PRN Max 4/day    ibuprofen (MOTRIN) tablet 600 mg  600 mg Oral Q8H PRN    lactated ringers infusion  100 mL/hr Intravenous Continuous    lactated ringers infusion  100 mL/hr Intravenous Continuous    lactated ringers infusion  125 mL/hr Intravenous Continuous    lactated ringers infusion  100 mL/hr Intravenous Continuous    lactated ringers infusion  100 mL/hr Intravenous Continuous    lactated ringers infusion  125 mL/hr Intravenous Continuous    lidocaine (PF) (XYLOCAINE-MPF) 1 % injection 0.5 mL  0.5 mL Infiltration Once PRN    melatonin tablet 3 mg  3 mg Oral HS    metFORMIN (GLUCOPHAGE) tablet 500 mg  500 mg Oral Daily With Breakfast    methocarbamol (ROBAXIN) tablet 500 mg  500 mg Oral Q6H PRN    minocycline (MINOCIN) capsule 100 mg  100 mg Oral Q12H KAYLIE    nicotine polacrilex (NICORETTE) gum 4 mg  4 mg Oral Q2H PRN    ondansetron (ZOFRAN-ODT) dispersible tablet 4 mg  4 mg Oral Q6H PRN    polyethylene glycol (MIRALAX) packet 17 g  17 g Oral Daily PRN    polyethylene glycol (MIRALAX) packet 17 g  17 g Oral Daily    propranolol (INDERAL) tablet 10 mg  10 mg Oral Q12H KAYLIE    senna-docusate sodium (SENOKOT S) 8.6-50 mg per tablet 1 tablet  1 tablet Oral Daily PRN    senna-docusate sodium (SENOKOT S) 8.6-50 mg per tablet 2 tablet  2 tablet Oral BID    traZODone (DESYREL) tablet 50 mg  50 mg Oral HS PRN    white petrolatum-mineral oil (EUCERIN,HYDROCERIN) cream   Topical TID PRN   .    Labs: I have personally reviewed all pertinent laboratory/tests results. Most Recent Labs:   Lab Results   Component Value Date    WBC 8.73 03/11/2024    RBC 5.41 03/11/2024    HGB 13.1 03/11/2024    HCT 43.3 03/11/2024    PLT  237 03/11/2024    RDW 13.1 03/11/2024    NEUTROABS 4.45 03/11/2024    SODIUM 136 01/11/2024    K 4.1 01/11/2024     01/11/2024    CO2 27 01/11/2024    BUN 12 01/11/2024    CREATININE 0.90 01/11/2024    GLUC 97 01/11/2024    GLUF 97 01/11/2024    CALCIUM 9.4 01/11/2024    AST 23 01/11/2024    ALT 50 01/11/2024    ALKPHOS 76 01/11/2024    TP 7.1 01/11/2024    ALB 4.0 01/11/2024    TBILI 0.43 01/11/2024    CHOLESTEROL 156 03/14/2024    HDL 44 03/14/2024    TRIG 133 03/14/2024    LDLCALC 85 03/14/2024    NONHDLC 112 03/14/2024    LITHIUM 0.61 01/09/2024    [id number] 1.062 11/15/2023    HGBA1C 5.8 (H) 01/23/2024     01/23/2024     Labs in last 72 hours:   Recent Labs     03/14/24  0640   CHOLESTEROL 156   HDL 44   TRIG 133   LDLCALC 85     Admission Labs:   No results displayed because visit has over 200 results.          Counseling / Coordination of Care  Total floor / unit time spent today 15 minutes. Greater than 50% of total time was spent with the patient and / or family counseling and / or coordination of care. A description of the counseling / coordination of care: Discussion of patient case with treatment team

## 2024-03-17 NOTE — PROGRESS NOTES
Progress Note - Behavioral Health   Alberto Berumen 28 y.o. male MRN: [id number]  Unit/Bed#: [facility] 383-02 Encounter: [id number]    Assessment/Plan   Principal Problem:    Schizoaffective disorder, bipolar type (HCC)  Active Problems:    GERD (gastroesophageal reflux disease)    Tobacco abuse    T wave inversion in EKG    Chronic idiopathic constipation    Vitamin B 12 deficiency    Vitamin D deficiency    Acanthosis nigricans    Rash    Class 2 obesity in adult      Behavior over the last 24 hours:  unchanged  Sleep: normal  Appetite: normal  Medication side effects: No  ROS: all other systems are negative    Subjective: Patient was seen and examined today at the bedside during morning rounds.  He is calm and cooperative and social with select peers.  He continued to express his depressive feelings and reports continued auditory hallucinations.  Still motivated to continue with ECT treatments as planned and scheduled    Mental Status Evaluation:  Appearance:  bearded and casually dressed   Behavior:  guarded   Speech:  normal pitch   Mood:  dysthymic   Affect:  constricted   Thought Process:  circumstantial   Associations: circumstantial associations   Thought Content:  normal   Perceptual Disturbances: Auditory hallucinations with commands to hurt others   Risk Potential: Suicidal Ideations none  Homicidal Ideations without plan  Potential for Aggression No   Sensorium:  person, place, and time/date   Memory:  recent and remote memory: unable to assess due to lack of cooperation   Consciousness:  alert and awake    Attention: attention span appeared shorter than expected for age   Insight:  limited   Judgment: limited   Gait/Station: normal gait/station   Motor Activity: no abnormal movements     Progress Toward Goals: Continue ECT treatment    Recommended Treatment: Continue with group therapy, milieu therapy and occupational therapy.      Risks, benefits and possible side effects of Medications:   Risks, benefits,  and possible side effects of medications explained to patient and patient verbalizes understanding.      Medications: all current active meds have been reviewed, continue current psychiatric medications, and current meds:   Current Facility-Administered Medications   Medication Dose Route Frequency    acetaminophen (TYLENOL) tablet 650 mg  650 mg Oral Q6H PRN    acetaminophen (TYLENOL) tablet 650 mg  650 mg Oral Q4H PRN    acetaminophen (TYLENOL) tablet 975 mg  975 mg Oral Q6H PRN    aluminum-magnesium hydroxide-simethicone (MAALOX) oral suspension 30 mL  30 mL Oral Q4H PRN    atropine (ISOPTO ATROPINE) 1 % ophthalmic solution 1 drop  1 drop Sublingual HS    atropine (ISOPTO ATROPINE) 1 % ophthalmic solution 1 drop  1 drop Sublingual Daily PRN    haloperidol lactate (HALDOL) injection 2.5 mg  2.5 mg Intramuscular Q4H PRN Max 4/day    And    LORazepam (ATIVAN) injection 1 mg  1 mg Intramuscular Q4H PRN Max 4/day    And    benztropine (COGENTIN) injection 0.5 mg  0.5 mg Intramuscular Q4H PRN Max 4/day    haloperidol lactate (HALDOL) injection 5 mg  5 mg Intramuscular Q4H PRN Max 4/day    And    LORazepam (ATIVAN) injection 2 mg  2 mg Intramuscular Q4H PRN Max 4/day    And    benztropine (COGENTIN) injection 1 mg  1 mg Intramuscular Q4H PRN Max 4/day    benztropine (COGENTIN) tablet 1 mg  1 mg Oral Q4H PRN Max 6/day    bisacodyl (DULCOLAX) rectal suppository 10 mg  10 mg Rectal Daily PRN    cloZAPine (CLOZARIL) tablet 450 mg  450 mg Oral HS    hydrOXYzine HCL (ATARAX) tablet 50 mg  50 mg Oral Q6H PRN Max 4/day    Or    diphenhydrAMINE (BENADRYL) injection 50 mg  50 mg Intramuscular Q6H PRN    diphenhydrAMINE-zinc acetate (BENADRYL) 2-0.1 % cream   Topical BID PRN    escitalopram (LEXAPRO) tablet 20 mg  20 mg Oral Daily    haloperidol (HALDOL) tablet 1 mg  1 mg Oral Q6H PRN    haloperidol (HALDOL) tablet 2.5 mg  2.5 mg Oral Q4H PRN Max 4/day    haloperidol (HALDOL) tablet 5 mg  5 mg Oral Q4H PRN Max 4/day    hydrALAZINE  (APRESOLINE) injection 5 mg  5 mg Intravenous Q10 Min PRN    hydrocortisone 1 % ointment   Topical 4x Daily PRN    hydrOXYzine HCL (ATARAX) tablet 100 mg  100 mg Oral Q6H PRN Max 4/day    Or    LORazepam (ATIVAN) injection 2 mg  2 mg Intramuscular Q6H PRN    hydrOXYzine HCL (ATARAX) tablet 25 mg  25 mg Oral Q6H PRN Max 4/day    ibuprofen (MOTRIN) tablet 600 mg  600 mg Oral Q8H PRN    lactated ringers infusion  100 mL/hr Intravenous Continuous    lactated ringers infusion  100 mL/hr Intravenous Continuous    lactated ringers infusion  125 mL/hr Intravenous Continuous    lactated ringers infusion  100 mL/hr Intravenous Continuous    lactated ringers infusion  100 mL/hr Intravenous Continuous    lactated ringers infusion  125 mL/hr Intravenous Continuous    lidocaine (PF) (XYLOCAINE-MPF) 1 % injection 0.5 mL  0.5 mL Infiltration Once PRN    melatonin tablet 3 mg  3 mg Oral HS    metFORMIN (GLUCOPHAGE) tablet 500 mg  500 mg Oral Daily With Breakfast    methocarbamol (ROBAXIN) tablet 500 mg  500 mg Oral Q6H PRN    minocycline (MINOCIN) capsule 100 mg  100 mg Oral Q12H KAYLIE    nicotine polacrilex (NICORETTE) gum 4 mg  4 mg Oral Q2H PRN    ondansetron (ZOFRAN-ODT) dispersible tablet 4 mg  4 mg Oral Q6H PRN    polyethylene glycol (MIRALAX) packet 17 g  17 g Oral Daily PRN    polyethylene glycol (MIRALAX) packet 17 g  17 g Oral Daily    propranolol (INDERAL) tablet 10 mg  10 mg Oral Q12H KAYLIE    senna-docusate sodium (SENOKOT S) 8.6-50 mg per tablet 1 tablet  1 tablet Oral Daily PRN    senna-docusate sodium (SENOKOT S) 8.6-50 mg per tablet 2 tablet  2 tablet Oral BID    traZODone (DESYREL) tablet 50 mg  50 mg Oral HS PRN    white petrolatum-mineral oil (EUCERIN,HYDROCERIN) cream   Topical TID PRN   .    Labs: I have personally reviewed all pertinent laboratory/tests results. Most Recent Labs:   Lab Results   Component Value Date    WBC 8.73 03/11/2024    RBC 5.41 03/11/2024    HGB 13.1 03/11/2024    HCT 43.3 03/11/2024    PLT  "237 03/11/2024    RDW 13.1 03/11/2024    NEUTROABS 4.45 03/11/2024    SODIUM 136 01/11/2024    K 4.1 01/11/2024     01/11/2024    CO2 27 01/11/2024    BUN 12 01/11/2024    CREATININE 0.90 01/11/2024    GLUC 97 01/11/2024    GLUF 97 01/11/2024    CALCIUM 9.4 01/11/2024    AST 23 01/11/2024    ALT 50 01/11/2024    ALKPHOS 76 01/11/2024    TP 7.1 01/11/2024    ALB 4.0 01/11/2024    TBILI 0.43 01/11/2024    CHOLESTEROL 156 03/14/2024    HDL 44 03/14/2024    TRIG 133 03/14/2024    LDLCALC 85 03/14/2024    NONHDLC 112 03/14/2024    LITHIUM 0.61 01/09/2024    [id number] 1.062 11/15/2023    HGBA1C 5.8 (H) 01/23/2024     01/23/2024     Labs in last 72 hours:   No results for input(s): \"WBC\", \"RBC\", \"HGB\", \"HCT\", \"PLT\", \"RDW\", \"NEUTROABS\", \"SODIUM\", \"K\", \"CL\", \"CO2\", \"BUN\", \"CREATININE\", \"GLUC\", \"GLUF\", \"CALCIUM\", \"AST\", \"ALT\", \"ALKPHOS\", \"TP\", \"ALB\", \"TBILI\", \"CHOLESTEROL\", \"HDL\", \"TRIG\", \"LDLCALC\", \"VALPROICTOT\", \"CARBAMAZEPIN\", \"LITHIUM\", \"AMMONIA\", \"[id number]\", \"FREET4\", \"T3FREE\", \"PREGTESTUR\", \"PREGSERUM\", \"HCG\", \"HCGQUANT\", \"RPR\" in the last 72 hours.    Admission Labs:   No results displayed because visit has over 200 results.          Counseling / Coordination of Care  Total floor / unit time spent today 15 minutes. Greater than 50% of total time was spent with the patient and / or family counseling and / or coordination of care. A description of the counseling / coordination of care: Discussion of patient case with treatment team  "

## 2024-03-17 NOTE — NURSING NOTE
"Patient denies any SI, HI, and VH's.  Patient stated AH's stated \"they are going to kill him and his family.\"  Denies anxiety and depression.  Patient is medication compliant.  Patient skipped breakfast.  Patient visible on the unit.  Denies and needs at this time.  "

## 2024-03-17 NOTE — NURSING NOTE
"Patient is in the community and social with peers. Patient reports AH, but \"not too bad.\" Patient denies SI/HI. Patient is compliant with scheduled medications Q7 observation maintained.   "

## 2024-03-18 ENCOUNTER — ANESTHESIA (INPATIENT)
Dept: PREOP/PACU | Facility: HOSPITAL | Age: 29
DRG: 750 | End: 2024-03-18
Payer: COMMERCIAL

## 2024-03-18 ENCOUNTER — APPOINTMENT (INPATIENT)
Dept: PREOP/PACU | Facility: HOSPITAL | Age: 29
DRG: 750 | End: 2024-03-18
Attending: STUDENT IN AN ORGANIZED HEALTH CARE EDUCATION/TRAINING PROGRAM
Payer: COMMERCIAL

## 2024-03-18 ENCOUNTER — ANESTHESIA EVENT (INPATIENT)
Dept: PREOP/PACU | Facility: HOSPITAL | Age: 29
DRG: 750 | End: 2024-03-18
Payer: COMMERCIAL

## 2024-03-18 LAB
BASOPHILS # BLD AUTO: 0.07 THOUSANDS/ÂΜL (ref 0–0.1)
BASOPHILS NFR BLD AUTO: 1 % (ref 0–1)
EOSINOPHIL # BLD AUTO: 0.31 THOUSAND/ÂΜL (ref 0–0.61)
EOSINOPHIL NFR BLD AUTO: 3 % (ref 0–6)
ERYTHROCYTE [DISTWIDTH] IN BLOOD BY AUTOMATED COUNT: 13 % (ref 11.6–15.1)
HCT VFR BLD AUTO: 40.2 % (ref 36.5–49.3)
HGB BLD-MCNC: 12.1 G/DL (ref 12–17)
IMM GRANULOCYTES # BLD AUTO: 0.03 THOUSAND/UL (ref 0–0.2)
IMM GRANULOCYTES NFR BLD AUTO: 0 % (ref 0–2)
LYMPHOCYTES # BLD AUTO: 3.38 THOUSANDS/ÂΜL (ref 0.6–4.47)
LYMPHOCYTES NFR BLD AUTO: 35 % (ref 14–44)
MCH RBC QN AUTO: 24 PG (ref 26.8–34.3)
MCHC RBC AUTO-ENTMCNC: 30.1 G/DL (ref 31.4–37.4)
MCV RBC AUTO: 80 FL (ref 82–98)
MONOCYTES # BLD AUTO: 0.89 THOUSAND/ÂΜL (ref 0.17–1.22)
MONOCYTES NFR BLD AUTO: 9 % (ref 4–12)
NEUTROPHILS # BLD AUTO: 4.93 THOUSANDS/ÂΜL (ref 1.85–7.62)
NEUTS SEG NFR BLD AUTO: 52 % (ref 43–75)
NRBC BLD AUTO-RTO: 0 /100 WBCS
PLATELET # BLD AUTO: 247 THOUSANDS/UL (ref 149–390)
PMV BLD AUTO: 11 FL (ref 8.9–12.7)
RBC # BLD AUTO: 5.05 MILLION/UL (ref 3.88–5.62)
WBC # BLD AUTO: 9.61 THOUSAND/UL (ref 4.31–10.16)

## 2024-03-18 PROCEDURE — 90870 ELECTROCONVULSIVE THERAPY: CPT | Performed by: STUDENT IN AN ORGANIZED HEALTH CARE EDUCATION/TRAINING PROGRAM

## 2024-03-18 PROCEDURE — GZB2ZZZ ELECTROCONVULSIVE THERAPY, BILATERAL-SINGLE SEIZURE: ICD-10-PCS | Performed by: STUDENT IN AN ORGANIZED HEALTH CARE EDUCATION/TRAINING PROGRAM

## 2024-03-18 PROCEDURE — 85025 COMPLETE CBC W/AUTO DIFF WBC: CPT | Performed by: NURSE PRACTITIONER

## 2024-03-18 PROCEDURE — 99232 SBSQ HOSP IP/OBS MODERATE 35: CPT | Performed by: PSYCHIATRY & NEUROLOGY

## 2024-03-18 RX ORDER — LABETALOL HYDROCHLORIDE 5 MG/ML
INJECTION, SOLUTION INTRAVENOUS AS NEEDED
Status: DISCONTINUED | OUTPATIENT
Start: 2024-03-18 | End: 2024-03-18

## 2024-03-18 RX ORDER — GLYCOPYRROLATE 0.2 MG/ML
INJECTION INTRAMUSCULAR; INTRAVENOUS AS NEEDED
Status: DISCONTINUED | OUTPATIENT
Start: 2024-03-18 | End: 2024-03-18

## 2024-03-18 RX ORDER — METHOHEXITAL IN WATER/PF 100MG/10ML
SYRINGE (ML) INTRAVENOUS AS NEEDED
Status: DISCONTINUED | OUTPATIENT
Start: 2024-03-18 | End: 2024-03-18

## 2024-03-18 RX ORDER — ESMOLOL HYDROCHLORIDE 10 MG/ML
INJECTION INTRAVENOUS AS NEEDED
Status: DISCONTINUED | OUTPATIENT
Start: 2024-03-18 | End: 2024-03-18

## 2024-03-18 RX ORDER — SUCCINYLCHOLINE/SOD CL,ISO/PF 100 MG/5ML
SYRINGE (ML) INTRAVENOUS AS NEEDED
Status: DISCONTINUED | OUTPATIENT
Start: 2024-03-18 | End: 2024-03-18

## 2024-03-18 RX ORDER — KETOROLAC TROMETHAMINE 30 MG/ML
INJECTION, SOLUTION INTRAMUSCULAR; INTRAVENOUS AS NEEDED
Status: DISCONTINUED | OUTPATIENT
Start: 2024-03-18 | End: 2024-03-18

## 2024-03-18 RX ADMIN — MELATONIN TAB 3 MG 3 MG: 3 TAB at 21:13

## 2024-03-18 RX ADMIN — PROPRANOLOL HYDROCHLORIDE 10 MG: 10 TABLET ORAL at 08:16

## 2024-03-18 RX ADMIN — GLYCOPYRROLATE 0.4 MG: 0.2 INJECTION, SOLUTION INTRAMUSCULAR; INTRAVENOUS at 06:48

## 2024-03-18 RX ADMIN — ACETAMINOPHEN 650 MG: 325 TABLET ORAL at 08:16

## 2024-03-18 RX ADMIN — CLOZAPINE 450 MG: 25 TABLET ORAL at 21:12

## 2024-03-18 RX ADMIN — ESCITALOPRAM OXALATE 20 MG: 10 TABLET ORAL at 08:16

## 2024-03-18 RX ADMIN — NICOTINE POLACRILEX 4 MG: 4 GUM, CHEWING ORAL at 21:12

## 2024-03-18 RX ADMIN — MINOCYCLINE HYDROCHLORIDE 100 MG: 100 CAPSULE ORAL at 21:12

## 2024-03-18 RX ADMIN — METHOHEXITAL SODIUM 120 MG: 500 INJECTION, POWDER, LYOPHILIZED, FOR SOLUTION INTRAMUSCULAR; INTRAVENOUS; RECTAL at 06:49

## 2024-03-18 RX ADMIN — POLYETHYLENE GLYCOL 3350 17 G: 17 POWDER, FOR SOLUTION ORAL at 08:15

## 2024-03-18 RX ADMIN — ATROPINE SULFATE 1 DROP: 10 SOLUTION/ DROPS OPHTHALMIC at 21:13

## 2024-03-18 RX ADMIN — MINOCYCLINE HYDROCHLORIDE 100 MG: 100 CAPSULE ORAL at 08:15

## 2024-03-18 RX ADMIN — NICOTINE POLACRILEX 4 MG: 4 GUM, CHEWING ORAL at 15:38

## 2024-03-18 RX ADMIN — ESMOLOL HYDROCHLORIDE 50 MG: 100 INJECTION, SOLUTION INTRAVENOUS at 06:49

## 2024-03-18 RX ADMIN — NICOTINE POLACRILEX 4 MG: 4 GUM, CHEWING ORAL at 13:18

## 2024-03-18 RX ADMIN — NICOTINE POLACRILEX 4 MG: 4 GUM, CHEWING ORAL at 17:52

## 2024-03-18 RX ADMIN — LABETALOL HYDROCHLORIDE 20 MG: 5 INJECTION, SOLUTION INTRAVENOUS at 06:49

## 2024-03-18 RX ADMIN — METFORMIN HYDROCHLORIDE 500 MG: 500 TABLET ORAL at 08:16

## 2024-03-18 RX ADMIN — SENNOSIDES AND DOCUSATE SODIUM 2 TABLET: 8.6; 5 TABLET ORAL at 17:50

## 2024-03-18 RX ADMIN — PROPRANOLOL HYDROCHLORIDE 10 MG: 10 TABLET ORAL at 21:12

## 2024-03-18 RX ADMIN — KETOROLAC TROMETHAMINE 30 MG: 30 INJECTION, SOLUTION INTRAMUSCULAR; INTRAVENOUS at 06:48

## 2024-03-18 RX ADMIN — SENNOSIDES AND DOCUSATE SODIUM 2 TABLET: 8.6; 5 TABLET ORAL at 08:15

## 2024-03-18 RX ADMIN — Medication 140 MG: at 06:49

## 2024-03-18 NOTE — ANESTHESIA POSTPROCEDURE EVALUATION
Post-Op Assessment Note    CV Status:  Stable  Pain Score: 0    Pain management: adequate       Mental Status:  Alert   Hydration Status:  Stable   PONV Controlled:  Controlled   Airway Patency:  Patent     Post Op Vitals Reviewed: Yes    No anethesia notable event occurred.    Staff: CRNA               BP   171/96   Temp      Pulse  95   Resp   20   SpO2   95

## 2024-03-18 NOTE — NURSING NOTE
"Pt returned from ECT treatment to unit 3P, via wheelchair, accompanied by this writer at 0735. VS all WNL with exception to elevated blood pressure. Pt c/o no dysphagia and observed swallowing with no difficulty. Pt reported 6/10 headache and was give PRN Acetaminophen, after administration pt was able to fall back asleep, no s/s further discomfort. Pt is alert while awake and oriented, but stated he is feeling \"very tired\" and would like to sleep.   "

## 2024-03-18 NOTE — NURSING NOTE
Pt reports 6/10 headache upon returning from his ECT treatment. PRN Acetaminophen 650 mg PO administered at 0816.    0916, Pt observed sleeping/ snoring, no s/s further discomfort

## 2024-03-18 NOTE — ANESTHESIA PREPROCEDURE EVALUATION
Procedure:  ELECTROCONVULSIVE THERAPY (ECT)    Relevant Problems   GI/HEPATIC   (+) GERD (gastroesophageal reflux disease)      Behavioral Health   (+) Tobacco abuse        Physical Exam    Airway    Mallampati score: III  TM Distance: >3 FB  Neck ROM: full     Dental       Cardiovascular  Rhythm: regular, Rate: normal, Cardiovascular exam normal    Pulmonary  Pulmonary exam normal Breath sounds clear to auscultation    Other Findings        Anesthesia Plan  ASA Score- 2     Anesthesia Type- IV sedation with anesthesia with ASA Monitors.         Additional Monitors:     Airway Plan:     Comment: Discussed risks/benefits, including medication reactions, awareness, aspiration, and serious/life threatening complications.    Plan to maintain native airway with IVGA, monitored with EtCO2.       Plan Factors-Exercise tolerance (METS): >4 METS.    Chart reviewed.    Patient summary reviewed.      Patient instructed to abstain from smoking on day of procedure. Patient did not smoke on day of surgery.            Induction- intravenous.    Postoperative Plan-     Informed Consent- Anesthetic plan and risks discussed with patient.  I personally reviewed this patient with the CRNA. Discussed and agreed on the Anesthesia Plan with the CRNA..

## 2024-03-18 NOTE — PROGRESS NOTES
"Progress Note - Behavioral Health     Alberto Berumen 28 y.o. male MRN: [id number]   Unit/Bed#: [facility] 383-02 Encounter: [id number]    Documentation, nursing notes, medication reconciliation, labs, and vitals reviewed. Patient was seen for continuing care and reviewed with treatment team. No acute events over the past 24 hours.  Per nursing report, isolative to his room during the day, more active in the milieu in the evening.  Has been reporting some improvement in his depressive symptoms, but ongoing auditory hallucinations.    No medication changes over the past 24 hours. Continues to tolerate current medications with no adverse effects.  ECT #9 this morning with headache following.      On evaluation today, he is seen in his room and resting in bed post-ECT.  Sleepy during our interaction.  Has intermittently over the weekend stated depression \"improved\" and/or auditory hallucinations \"not so bad \".  Today reports minimal improvement.  No suicidal ideation, plan, or intent. Ongoing perceptual disturbances and does not exhibit any symptoms of aimee on evaluation.    Psychiatric ROS:  Behavior over the last 24 hours: minimal improvement  Sleep: hypersomnia  Appetite: fair  Medication side effects: No   ROS: no complaints, all other systems are negative    Mental Status Evaluation:    Appearance:  marginal hygiene   Behavior:  cooperative   Speech:  scant   Mood:  dysphoric   Affect:  constricted   Thought Process:  decreased rate of thoughts   Associations: concrete associations   Thought Content:  persecutory delusions   Perceptual Disturbances: auditory hallucinations   Risk Potential: Suicidal ideation - None  Homicidal ideation - None  Potential for aggression - No   Sensorium:  oriented to person, place, time/date, and situation   Memory:  recent and remote memory grossly intact   Consciousness:  alert and awake   Attention/Concentration: decreased concentration and decreased attention span   Insight:  limited "   Judgment: limited   Gait/Station: normal gait/station, normal balance   Motor Activity: no abnormal movements     Vital signs in last 24 hours:    Temp:  [97.5 °F (36.4 °C)-98.6 °F (37 °C)] 98.4 °F (36.9 °C)  HR:  [] 77  Resp:  [11-27] 18  BP: (116-171)/() 140/90    Laboratory results: I have personally reviewed all pertinent laboratory/tests results    Results from the past 24 hours:   Recent Results (from the past 24 hour(s))   CBC and differential    Collection Time: 03/18/24  5:32 AM   Result Value Ref Range    WBC 9.61 4.31 - 10.16 Thousand/uL    RBC 5.05 3.88 - 5.62 Million/uL    Hemoglobin 12.1 12.0 - 17.0 g/dL    Hematocrit 40.2 36.5 - 49.3 %    MCV 80 (L) 82 - 98 fL    MCH 24.0 (L) 26.8 - 34.3 pg    MCHC 30.1 (L) 31.4 - 37.4 g/dL    RDW 13.0 11.6 - 15.1 %    MPV 11.0 8.9 - 12.7 fL    Platelets 247 149 - 390 Thousands/uL    nRBC 0 /100 WBCs    Neutrophils Relative 52 43 - 75 %    Immature Grans % 0 0 - 2 %    Lymphocytes Relative 35 14 - 44 %    Monocytes Relative 9 4 - 12 %    Eosinophils Relative 3 0 - 6 %    Basophils Relative 1 0 - 1 %    Neutrophils Absolute 4.93 1.85 - 7.62 Thousands/µL    Absolute Immature Grans 0.03 0.00 - 0.20 Thousand/uL    Absolute Lymphocytes 3.38 0.60 - 4.47 Thousands/µL    Absolute Monocytes 0.89 0.17 - 1.22 Thousand/µL    Eosinophils Absolute 0.31 0.00 - 0.61 Thousand/µL    Basophils Absolute 0.07 0.00 - 0.10 Thousands/µL       Suicide/Homicide Risk Assessment:    Risk of Harm to Self:   Current Specific Risk Factors include: current depressive symptoms, presence of hallucinations  Protective Factors: no current suicidal ideation, ability to communicate with staff on the unit, able to contract for safety on the unit, taking medications as ordered on the unit  Based on today's assessment, Alberto presents the following risk of harm to self: minimal    Risk of Harm to Others:  Current Specific Risk Factors include: current psychotic symptoms  Protective Factors: no  current homicidal ideation, psychotic symptoms are less prominent, compliant with medications on the unit as ordered  Based on today's assessment, Alberto presents the following risk of harm to others: minimal    The following interventions are recommended: behavioral checks every 7 minutes, continued hospitalization on locked unit    Progress Toward Goals: minimal improvement, still depressed, still psychotic, poor motivation    Assessment/Plan   Principal Problem:    Schizoaffective disorder, bipolar type (HCC)  Active Problems:    GERD (gastroesophageal reflux disease)    Tobacco abuse    T wave inversion in EKG    Chronic idiopathic constipation    Vitamin B 12 deficiency    Vitamin D deficiency    Acanthosis nigricans    Rash    Class 2 obesity in adult      Recommended Treatment:     Planned medication and treatment changes:    All current active medications have been reviewed  Encourage group therapy, milieu therapy and occupational therapy  Behavioral Health checks every 7 minutes  Consider Extended Acute Care referral  Continue ECT #10 in 2 days  Check CMP next week  Monitor weekly CBC for Clozaril    Continue current medications:    Current Facility-Administered Medications   Medication Dose Route Frequency Provider Last Rate    acetaminophen  650 mg Oral Q6H PRN Yasmin Harvey MD      acetaminophen  650 mg Oral Q4H PRN Yasmin Harvey MD      acetaminophen  975 mg Oral Q6H PRN Yasmin Harvey MD      aluminum-magnesium hydroxide-simethicone  30 mL Oral Q4H PRN Yasmin Harvey MD      atropine  1 drop Sublingual HS HOLLI Anderson      atropine  1 drop Sublingual Daily PRN HOLLI Anderson      haloperidol lactate  2.5 mg Intramuscular Q4H PRN Max 4/day Yasmin Harvey MD      And    LORazepam  1 mg Intramuscular Q4H PRN Max 4/day Yasmin Harvey MD      And    benztropine  0.5 mg Intramuscular Q4H PRN Max 4/day Yasmin Harvey MD      haloperidol lactate  5 mg Intramuscular Q4H PRN Max  4/day Yasmin Harvey MD      And    LORazepam  2 mg Intramuscular Q4H PRN Max 4/day Yasmin Harvey MD      And    benztropine  1 mg Intramuscular Q4H PRN Max 4/day Yasmin Harvey MD      benztropine  1 mg Oral Q4H PRN Max 6/day Yasmin Harvey MD      bisacodyl  10 mg Rectal Daily PRN Yasmin Harvey MD      cloZAPine  450 mg Oral HS Prisca Villafuerte, STEVENP      hydrOXYzine HCL  50 mg Oral Q6H PRN Max 4/day Yasmin Harvey MD      Or    diphenhydrAMINE  50 mg Intramuscular Q6H PRN Yasmin Harvey MD      diphenhydrAMINE-zinc acetate   Topical BID PRN Anjel Sagastume MD      escitalopram  20 mg Oral Daily La Flores MD      haloperidol  1 mg Oral Q6H PRN Yasmin Harvey MD      haloperidol  2.5 mg Oral Q4H PRN Max 4/day Yasmin Harvey MD      haloperidol  5 mg Oral Q4H PRN Max 4/day Yasmin Harvey MD      hydrALAZINE  5 mg Intravenous Q10 Min PRN Khoa Gutierrez CRNA      hydrocortisone   Topical 4x Daily PRN HOLLI Monge      hydrOXYzine HCL  100 mg Oral Q6H PRN Max 4/day Yasmin Harvey MD      Or    LORazepam  2 mg Intramuscular Q6H PRN Yasmin Harvey MD      hydrOXYzine HCL  25 mg Oral Q6H PRN Max 4/day Yasmin Harvey MD      ibuprofen  600 mg Oral Q8H PRN HOLLI Monge      lactated ringers  100 mL/hr Intravenous Continuous Elisa Workman  mL/hr (03/15/24 0700)    lactated ringers  100 mL/hr Intravenous Continuous Elisa Workman MD      lactated ringers  125 mL/hr Intravenous Continuous Vikas Madison MD Stopped (03/15/24 0756)    lactated ringers  100 mL/hr Intravenous Continuous Anjel Arriaza CRNA      lactated ringers  100 mL/hr Intravenous Continuous Elisa Workman  mL/hr (03/13/24 0610)    lactated ringers  125 mL/hr Intravenous Continuous Anthony Fontana MD Stopped (03/15/24 0756)    lidocaine (PF)  0.5 mL Infiltration Once PRN Anthony Fontana MD      melatonin  3 mg Oral HS Yasmin Harvey MD      metFORMIN   500 mg Oral Daily With Breakfast HOLLI Monge      methocarbamol  500 mg Oral Q6H PRN HOLLI Monge      minocycline  100 mg Oral Q12H [facility] HOLLI Monge      nicotine polacrilex  4 mg Oral Q2H PRN Yasmin Harvey MD      ondansetron  4 mg Oral Q6H PRN HOLLI Galvan      polyethylene glycol  17 g Oral Daily PRN Yasmin Harvey MD      polyethylene glycol  17 g Oral Daily Eileen HOLLI Higuera      propranolol  10 mg Oral Q12H [facility] HOLLI Anderson      senna-docusate sodium  1 tablet Oral Daily PRN Yasmin Harvey MD      senna-docusate sodium  2 tablet Oral BID La Flores MD      traZODone  50 mg Oral HS PRN Yasmin Harvey MD      white petrolatum-mineral oil   Topical TID PRN Anjel Sagastume MD       Risks / Benefits of Treatment:    Risks, benefits, and possible side effects of medications explained to patient and patient verbalizes understanding and agreement for treatment.  Discussed risks and benefits of Electroconvulsive Treatment with patient including risks of anesthesia and memory loss. Alberto verbalized understanding and agreed for ECT.    Counseling / Coordination of Care:    Patient's progress discussed with staff in treatment team meeting.  Medications, treatment progress and treatment plan reviewed with patient.    HOLLI Anderson 03/18/24

## 2024-03-18 NOTE — NURSING NOTE
"Pt has been isolative to room and self for the most of this shift, out in milieu when he has needs. He is cooperative and pleasant with staff. He is wearing his own clothing. He denies SI/HI/VH, but continues to have AH. Pt states \"I just want to feel normal and not have these voices. Nothing is working\". Pt appears depressed. Medication and meal compliant, with exception to skipping breakfast today. No unmet needs.   "

## 2024-03-18 NOTE — PROCEDURES
Procedure Note - ECT  Alberto Berumen 28 y.o. male MRN: [id number]    Patient is slightly improved today.  He remains constricted in affect but is calm and appropriate throughout encounter.  He reports slight improvement in his depression but does note that he continues to have auditory hallucinations.  He denies any SI, HI, or visual hallucinations.  He is agreeable to proceed with ECT treatment today.    Time out was taken with staff to confirm correct patient and correct procedure to be performed.    Session Number: 009    Diagnosis: Principal Problem:    Schizoaffective disorder, bipolar type ([facility])  Active Problems:    GERD (gastroesophageal reflux disease)    Tobacco abuse    T wave inversion in EKG    Chronic idiopathic constipation    Vitamin B 12 deficiency    Vitamin D deficiency    Acanthosis nigricans    Rash    Class 2 obesity in adult      ECT Type: Inpatient, Acute    Anesthesia: Brevital    Electrode Placement: bilateral    Energy level: 70%      Seizure Duration     EE sec  EM sec observed, not detected by machine    PSI 81.9%     Results:Clinical seizure was inadequate, Patient tolerated ECT well, would consider dose increase at next treatment given limited clinical response and decreased clinical seizure duration.    Vitals:    24 0710   BP: 162/80   Pulse: 93   Resp: 12   Temp:    SpO2: 96%        Medication Administration - last 24 hours from 2024 0716 to 2024 0716         Date/Time Order Dose Route Action Action by     2024 2059 EDT nicotine polacrilex (NICORETTE) gum 4 mg 4 mg Oral Given Svetlana Botello RN     2024 1825 EDT nicotine polacrilex (NICORETTE) gum 4 mg 4 mg Oral Given Maxwell Bosch LPN     2024 1549 EDT nicotine polacrilex (NICORETTE) gum 4 mg 4 mg Oral Given Maxwell Bosch LPN     2024 1318 EDT nicotine polacrilex (NICORETTE) gum 4 mg 4 mg Oral Given Amalia Mckoy RN     2024 1111 EDT nicotine polacrilex (NICORETTE) gum 4 mg  4 mg Oral Given Maxwell Bosch LPN     03/17/2024 2100 EDT melatonin tablet 3 mg 3 mg Oral Given Svetlana Botello RN     03/17/2024 1136 EDT polyethylene glycol (MIRALAX) packet 17 g [facility]     03/17/2024 1137 EDT escitalopram (LEXAPRO) tablet 20 mg 20 mg [facility]     03/17/2024 1140 EDT metFORMIN (GLUCOPHAGE) tablet 500 mg 500 mg Oral Not [facility]     03/17/2024 2100 EDT minocycline (MINOCIN) capsule 100 mg 100 mg Oral Given Svetlana Botello RN     03/17/2024 1137 EDT minocycline (MINOCIN) capsule 100 mg 100 mg [facility]     03/17/2024 2100 EDT propranolol (INDERAL) tablet 10 mg 10 mg Oral Given Svetlana Botello RN     03/17/2024 1140 EDT propranolol (INDERAL) tablet 10 mg 10 mg [facility]     03/17/2024 2100 EDT atropine (ISOPTO ATROPINE) 1 % ophthalmic solution 1 drop 1 drop Sublingual Given Svetlana Botello RN     03/17/2024 1723 EDT senna-docusate sodium (SENOKOT S) 8.6-50 mg per tablet 2 tablet 2 tablet Oral Given Eileen Mendoza     03/17/2024 1136 EDT senna-docusate sodium (SENOKOT S) 8.6-50 mg per tablet 2 tablet 2 tablet Oral Given Eileen Reji     03/17/2024 2100 EDT cloZAPine (CLOZARIL) tablet 450 mg 450 mg Oral Given Svetlana Botello RN             Media Information      Document Information    Clinical Image - Mobile Device   EEG   03/18/2024 06:55   Attached To:   Hospital Encounter on 11/14/23   Source Information    Guy Arroyo MD  [facility]       Guy Arroyo MD 03/18/24

## 2024-03-18 NOTE — PROGRESS NOTES
03/18/24 0859   Team Meeting   Meeting Type Daily Rounds   Team Members Present   Team Members Present Physician;Nurse;   Physician Team Member Mark   Nursing Team Member Tavon   Care Management Team Member Marco   Patient/Family Present   Patient Present No   Patient's Family Present No     ECT 9 this morning, headache this morning, PRN tylenol. Pt continues to report AH. Pt intermittently isolated to room and then visible on the unit. Med/meal compliant. Dc tbd.

## 2024-03-18 NOTE — NURSING NOTE
"Patient is in the community, calm and cooperative. Patient is social with peers and participates in unit activities. Patient reports AH, stating \"They are always there.\" Patient denies SI/HI. Patient is compliant with scheduled medications. Q7 observation maintained.   "

## 2024-03-19 ENCOUNTER — ANESTHESIA EVENT (OUTPATIENT)
Dept: ANESTHESIOLOGY | Facility: HOSPITAL | Age: 29
End: 2024-03-19

## 2024-03-19 ENCOUNTER — ANESTHESIA (OUTPATIENT)
Dept: ANESTHESIOLOGY | Facility: HOSPITAL | Age: 29
End: 2024-03-19

## 2024-03-19 PROCEDURE — 99232 SBSQ HOSP IP/OBS MODERATE 35: CPT | Performed by: PSYCHIATRY & NEUROLOGY

## 2024-03-19 RX ADMIN — MINOCYCLINE HYDROCHLORIDE 100 MG: 100 CAPSULE ORAL at 08:38

## 2024-03-19 RX ADMIN — SENNOSIDES AND DOCUSATE SODIUM 2 TABLET: 8.6; 5 TABLET ORAL at 08:37

## 2024-03-19 RX ADMIN — POLYETHYLENE GLYCOL 3350 17 G: 17 POWDER, FOR SOLUTION ORAL at 08:38

## 2024-03-19 RX ADMIN — METFORMIN HYDROCHLORIDE 500 MG: 500 TABLET ORAL at 08:37

## 2024-03-19 RX ADMIN — CLOZAPINE 450 MG: 25 TABLET ORAL at 21:51

## 2024-03-19 RX ADMIN — NICOTINE POLACRILEX 4 MG: 4 GUM, CHEWING ORAL at 12:13

## 2024-03-19 RX ADMIN — MELATONIN TAB 3 MG 3 MG: 3 TAB at 21:51

## 2024-03-19 RX ADMIN — NICOTINE POLACRILEX 4 MG: 4 GUM, CHEWING ORAL at 14:17

## 2024-03-19 RX ADMIN — ESCITALOPRAM OXALATE 20 MG: 10 TABLET ORAL at 08:38

## 2024-03-19 RX ADMIN — NICOTINE POLACRILEX 4 MG: 4 GUM, CHEWING ORAL at 08:38

## 2024-03-19 RX ADMIN — PROPRANOLOL HYDROCHLORIDE 10 MG: 10 TABLET ORAL at 08:38

## 2024-03-19 RX ADMIN — PROPRANOLOL HYDROCHLORIDE 10 MG: 10 TABLET ORAL at 21:52

## 2024-03-19 RX ADMIN — MINOCYCLINE HYDROCHLORIDE 100 MG: 100 CAPSULE ORAL at 21:51

## 2024-03-19 NOTE — PROGRESS NOTES
03/19/24 0854   Team Meeting   Meeting Type Daily Rounds   Team Members Present   Team Members Present Physician;Nurse;   Physician Team Member Mark   Nursing Team Member [facility] Management Team Member Marco   Patient/Family Present   Patient Present No   Patient's Family Present No     ECT 9 yesterday. Pt reports AH are getting quieter, but pt does have worries they will come back stronger. Pt educated on ECT. Pt does still report AH to threaten/harm family, pt reports they are easier to ignore. Med/meal compliant. Dc tbd.

## 2024-03-19 NOTE — PROGRESS NOTES
"Progress Note - Behavioral Health     Alberto Berumen 28 y.o. male MRN: [id number]   Unit/Bed#: [facility] 383-02 Encounter: [id number]    Documentation, nursing notes, medication reconciliation, labs, and vitals reviewed. Patient was seen for continuing care and reviewed with treatment team. No acute events over the past 24 hours.  Per nursing report, still more isolative during the day and more likely to be social in the evening.  Has continued to endorse some mild improvements with ECT.    No medication changes over the past 24 hours. Continues to tolerate current medications with no adverse effects.  Continues to receive ECT.      On evaluation today, he is seen in his room, but awake and more verbal.  Does continue to state he feels improvement in mood but also decrease in the frequency and intensity  of auditory hallucinations, \"I am less in my head \".  Today expressing concern that the voices will worsen when ECT discontinued and we talked about the possibility for maintenance ECT.  Also talked about what he would need to be able to return home.  Although he notes some improvements, he still feels \"too scared.  And states he still has a feeling that there is someone or some organization that wishes harm to him and his family.  Still feels he would be in danger if he returned home today.  No suicidal ideation, plan, or intent. Decreased  perceptual disturbances and does not exhibit any symptoms of aimee on evaluation.    Psychiatric ROS:  Behavior over the last 24 hours: minimally improved  Sleep: slept off and on  Appetite: fair  Medication side effects: No   ROS: no complaints, all other systems are negative    Mental Status Evaluation:    Appearance:  marginal hygiene   Behavior:  cooperative, guarded   Speech:  slow   Mood:  dysphoric   Affect:  blunted   Thought Process:  perseverative   Associations: concrete associations   Thought Content:  persecutory delusions   Perceptual Disturbances: auditory " hallucinations still present, but less frequent   Risk Potential: Suicidal ideation - None  Homicidal ideation - None  Potential for aggression - No   Sensorium:  oriented to person, place, and time/date   Memory:  recent and remote memory grossly intact   Consciousness:  alert and awake   Attention/Concentration: decreased concentration and decreased attention span   Insight:  limited   Judgment: limited   Gait/Station: normal gait/station, normal balance   Motor Activity: no abnormal movements     Vital signs in last 24 hours:    Temp:  [97.4 °F (36.3 °C)-98.7 °F (37.1 °C)] 97.4 °F (36.3 °C)  HR:  [] 95  Resp:  [18] 18  BP: (126-139)/(94-98) 139/96    Laboratory results: I have personally reviewed all pertinent laboratory/tests results    Results from the past 24 hours: No results found for this or any previous visit (from the past 24 hour(s)).    Suicide/Homicide Risk Assessment:    Risk of Harm to Self:   Current Specific Risk Factors include: presence of hallucinations, presence of delusions  Protective Factors: no current suicidal ideation, ability to communicate with staff on the unit, able to contract for safety on the unit, taking medications as ordered on the unit  Based on today's assessment, Alberto presents the following risk of harm to self: minimal    Risk of Harm to Others:  Current Specific Risk Factors include: current psychotic symptoms  Protective Factors: no current homicidal ideation, able to communicate with staff on the unit, psychotic symptoms are less prominent, compliant with medications on the unit as ordered  Based on today's assessment, Alberto presents the following risk of harm to others: minimal    The following interventions are recommended: behavioral checks every 7 minutes, continued hospitalization on locked unit    Progress Toward Goals: minimal improvement, mood is stabilizing, less psychotic    Assessment/Plan   Principal Problem:    Schizoaffective disorder, bipolar  type (HCC)  Active Problems:    GERD (gastroesophageal reflux disease)    Tobacco abuse    T wave inversion in EKG    Chronic idiopathic constipation    Vitamin B 12 deficiency    Vitamin D deficiency    Acanthosis nigricans    Rash    Class 2 obesity in adult      Recommended Treatment:     Planned medication and treatment changes:    All current active medications have been reviewed  Encourage group therapy, milieu therapy and occupational therapy  Behavioral Health checks every 7 minutes  Consider Extended Acute Care referral  Continue ECT #10 tomorrow  Check CMP on Monday  Monitor weekly CBC for clozaril    Continue all other medications:    Current Facility-Administered Medications   Medication Dose Route Frequency Provider Last Rate    acetaminophen  650 mg Oral Q6H PRN Yasmin Harvey MD      acetaminophen  650 mg Oral Q4H PRN Yasmin Harvey MD      acetaminophen  975 mg Oral Q6H PRN Yasmin Harvey MD      aluminum-magnesium hydroxide-simethicone  30 mL Oral Q4H PRN Yasmin Harvey MD      atropine  1 drop Sublingual HS HOLLI Anderson      atropine  1 drop Sublingual Daily PRN HOLLI Anderson      haloperidol lactate  2.5 mg Intramuscular Q4H PRN Max 4/day Yasmin Harvey MD      And    LORazepam  1 mg Intramuscular Q4H PRN Max 4/day Yasmin Harvey MD      And    benztropine  0.5 mg Intramuscular Q4H PRN Max 4/day Yasmin Harvey MD      haloperidol lactate  5 mg Intramuscular Q4H PRN Max 4/day Yasmin Harvey MD      And    LORazepam  2 mg Intramuscular Q4H PRN Max 4/day Yasmin Harvey MD      And    benztropine  1 mg Intramuscular Q4H PRN Max 4/day Yasmin Harvey MD      benztropine  1 mg Oral Q4H PRN Max 6/day Yasmin Harvey MD      bisacodyl  10 mg Rectal Daily PRN Yasmin Harvey MD      cloZAPine  450 mg Oral HS HOLLI Anderson      hydrOXYzine HCL  50 mg Oral Q6H PRN Max 4/day Yasmin Harvey MD      Or    diphenhydrAMINE  50 mg Intramuscular Q6H PRN Yasmin JACKMAN  MD Candice      diphenhydrAMINE-zinc acetate   Topical BID PRN Anjel Sagastume MD      escitalopram  20 mg Oral Daily La Flores MD      haloperidol  1 mg Oral Q6H PRN Yasmin Harvey MD      haloperidol  2.5 mg Oral Q4H PRN Max 4/day Yasmin Harvey MD      haloperidol  5 mg Oral Q4H PRN Max 4/day Yasmin Harvey MD      hydrALAZINE  5 mg Intravenous Q10 Min PRN Khoa Gutierrez CRNA      hydrocortisone   Topical 4x Daily PRN HOLLI Monge      hydrOXYzine HCL  100 mg Oral Q6H PRN Max 4/day Yasmin Harvey MD      Or    LORazepam  2 mg Intramuscular Q6H PRN Yasmin Harvey MD      hydrOXYzine HCL  25 mg Oral Q6H PRN Max 4/day Yasmin Harvey MD      ibuprofen  600 mg Oral Q8H PRN HOLLI Monge      lactated ringers  100 mL/hr Intravenous Continuous Elisa Workman  mL/hr (03/15/24 0700)    lactated ringers  100 mL/hr Intravenous Continuous Elisa Workman MD      lactated ringers  125 mL/hr Intravenous Continuous Vikas Madison MD Stopped (03/15/24 0756)    lactated ringers  100 mL/hr Intravenous Continuous Anjel Arriaza CRNA      lactated ringers  100 mL/hr Intravenous Continuous Elisa Workman  mL/hr (03/13/24 0610)    lactated ringers  125 mL/hr Intravenous Continuous Anthony Fontana MD Stopped (03/15/24 0756)    lidocaine (PF)  0.5 mL Infiltration Once PRN Anthony Fontana MD      melatonin  3 mg Oral HS Yasmin Harvey MD      metFORMIN  500 mg Oral Daily With Breakfast HOLLI Monge      methocarbamol  500 mg Oral Q6H PRN HOLLI Monge      minocycline  100 mg Oral Q12H [facility] HOLLI Monge      nicotine polacrilex  4 mg Oral Q2H PRN Yasmin Harvey MD      ondansetron  4 mg Oral Q6H PRN HOLLI Galvan      polyethylene glycol  17 g Oral Daily PRN Yasmin Harvey MD      polyethylene glycol  17 g Oral Daily HOLLI Monge      propranolol  10 mg Oral Q12H KAYLIE  HOLLI Anderson      senna-docusate sodium  1 tablet Oral Daily PRN Yasmin Harvey MD      senna-docusate sodium  2 tablet Oral BID La Flores MD      traZODone  50 mg Oral HS PRN Yasmin Harvey MD      white petrolatum-mineral oil   Topical TID PRN Anjel Sagastume MD       Risks / Benefits of Treatment:    Risks, benefits, and possible side effects of medications explained to patient. Patient has limited understanding of risks and benefits of treatment at this time, but agrees to take medications as prescribed.  Discussed risks and benefits of Electroconvulsive Treatment with patient including risks of anesthesia and memory loss. Alberto verbalized understanding and agreed for ECT.    Counseling / Coordination of Care:    Patient's progress discussed with staff in treatment team meeting.  Medications, treatment progress and treatment plan reviewed with patient.    HOLLI Anderson 03/19/24

## 2024-03-19 NOTE — NURSING NOTE
"Pt visible on the milieu and social with peers and staff this evening. Pt calm and pleasant on approach. During assessment Pt expressed feeling fearful about leaving milieu and worried that he won't be able to continue ECT treatments outside of the hospital. RN and this writer educated Pt that he will be able to continue his ECT treatments once he leaves the unit. Pt expressed some relief after receiving education. Pt denied anxiety and depression. Pt denied SI/HI/VH. Pt still having AH that are threatening to hurt his family. When asked about AH, Pt said that he thinks ECT is working for him and that, \"The voices are still there, but it's easier for me to tune them out and ignore them now.\" Pt adherent with scheduled medications. Pt declined having any unmet needs at this time.  "

## 2024-03-19 NOTE — ANESTHESIA PREPROCEDURE EVALUATION
Procedure:  PRE-OP ONLY    Prior ECT: 120 mg Brevital, 140 mg succinylcholine, 50 esmolol, labetalol 20 mg, Toradol 30 mg     ECG: ST with non specific T wave abnormality   Relevant Problems   GI/HEPATIC   (+) GERD (gastroesophageal reflux disease)             Anesthesia Plan  ASA Score- 2     Anesthesia Type- general with ASA Monitors.         Additional Monitors:     Airway Plan:            Plan Factors-    Chart reviewed. EKG reviewed.  Existing labs reviewed. Patient summary reviewed.                  Induction-     Postoperative Plan-     Informed Consent-

## 2024-03-19 NOTE — NURSING NOTE
Patient remains isolative to self and room. Medication and meal compliant. Denies SI/HI/VH, but continues to have AH. Denies any unmet needs at this time.

## 2024-03-19 NOTE — NURSING NOTE
Pt calm and cooperative, Pt pleasant upon approach, Pt visible on the unit seen on the phone and talking with peers, Pt denied all pain anxiety and depression, Pt denies HI and SI , Pt denied AH and VH, Pt took all scheduled HS meds, Q7 min safety checks maintained

## 2024-03-20 ENCOUNTER — ANESTHESIA (INPATIENT)
Dept: PREOP/PACU | Facility: HOSPITAL | Age: 29
DRG: 750 | End: 2024-03-20
Payer: COMMERCIAL

## 2024-03-20 ENCOUNTER — APPOINTMENT (INPATIENT)
Dept: PREOP/PACU | Facility: HOSPITAL | Age: 29
DRG: 750 | End: 2024-03-20
Attending: STUDENT IN AN ORGANIZED HEALTH CARE EDUCATION/TRAINING PROGRAM
Payer: COMMERCIAL

## 2024-03-20 ENCOUNTER — ANESTHESIA EVENT (INPATIENT)
Dept: PREOP/PACU | Facility: HOSPITAL | Age: 29
DRG: 750 | End: 2024-03-20
Payer: COMMERCIAL

## 2024-03-20 PROCEDURE — 90870 ELECTROCONVULSIVE THERAPY: CPT

## 2024-03-20 PROCEDURE — 90870 ELECTROCONVULSIVE THERAPY: CPT | Performed by: PSYCHIATRY & NEUROLOGY

## 2024-03-20 PROCEDURE — GZB2ZZZ ELECTROCONVULSIVE THERAPY, BILATERAL-SINGLE SEIZURE: ICD-10-PCS | Performed by: PSYCHIATRY & NEUROLOGY

## 2024-03-20 PROCEDURE — 99232 SBSQ HOSP IP/OBS MODERATE 35: CPT | Performed by: PSYCHIATRY & NEUROLOGY

## 2024-03-20 RX ORDER — ESMOLOL HYDROCHLORIDE 10 MG/ML
INJECTION INTRAVENOUS AS NEEDED
Status: DISCONTINUED | OUTPATIENT
Start: 2024-03-20 | End: 2024-03-20

## 2024-03-20 RX ORDER — METHOHEXITAL IN WATER/PF 100MG/10ML
SYRINGE (ML) INTRAVENOUS AS NEEDED
Status: DISCONTINUED | OUTPATIENT
Start: 2024-03-20 | End: 2024-03-20

## 2024-03-20 RX ORDER — LABETALOL HYDROCHLORIDE 5 MG/ML
INJECTION, SOLUTION INTRAVENOUS AS NEEDED
Status: DISCONTINUED | OUTPATIENT
Start: 2024-03-20 | End: 2024-03-20

## 2024-03-20 RX ORDER — KETOROLAC TROMETHAMINE 30 MG/ML
INJECTION, SOLUTION INTRAMUSCULAR; INTRAVENOUS AS NEEDED
Status: DISCONTINUED | OUTPATIENT
Start: 2024-03-20 | End: 2024-03-20

## 2024-03-20 RX ORDER — SUCCINYLCHOLINE/SOD CL,ISO/PF 100 MG/5ML
SYRINGE (ML) INTRAVENOUS AS NEEDED
Status: DISCONTINUED | OUTPATIENT
Start: 2024-03-20 | End: 2024-03-20

## 2024-03-20 RX ORDER — GLYCOPYRROLATE 0.2 MG/ML
INJECTION INTRAMUSCULAR; INTRAVENOUS AS NEEDED
Status: DISCONTINUED | OUTPATIENT
Start: 2024-03-20 | End: 2024-03-20

## 2024-03-20 RX ORDER — SODIUM CHLORIDE, SODIUM LACTATE, POTASSIUM CHLORIDE, CALCIUM CHLORIDE 600; 310; 30; 20 MG/100ML; MG/100ML; MG/100ML; MG/100ML
INJECTION, SOLUTION INTRAVENOUS CONTINUOUS PRN
Status: DISCONTINUED | OUTPATIENT
Start: 2024-03-20 | End: 2024-03-20

## 2024-03-20 RX ADMIN — SENNOSIDES AND DOCUSATE SODIUM 2 TABLET: 8.6; 5 TABLET ORAL at 17:37

## 2024-03-20 RX ADMIN — Medication 140 MG: at 06:55

## 2024-03-20 RX ADMIN — SODIUM CHLORIDE, SODIUM LACTATE, POTASSIUM CHLORIDE, AND CALCIUM CHLORIDE: .6; .31; .03; .02 INJECTION, SOLUTION INTRAVENOUS at 06:40

## 2024-03-20 RX ADMIN — NICOTINE POLACRILEX 4 MG: 4 GUM, CHEWING ORAL at 17:53

## 2024-03-20 RX ADMIN — ESCITALOPRAM OXALATE 20 MG: 10 TABLET ORAL at 08:26

## 2024-03-20 RX ADMIN — METHOHEXITAL SODIUM 120 MG: 500 INJECTION, POWDER, LYOPHILIZED, FOR SOLUTION INTRAMUSCULAR; INTRAVENOUS; RECTAL at 06:53

## 2024-03-20 RX ADMIN — SENNOSIDES AND DOCUSATE SODIUM 2 TABLET: 8.6; 5 TABLET ORAL at 08:26

## 2024-03-20 RX ADMIN — NICOTINE POLACRILEX 4 MG: 4 GUM, CHEWING ORAL at 20:20

## 2024-03-20 RX ADMIN — PROPRANOLOL HYDROCHLORIDE 10 MG: 10 TABLET ORAL at 21:57

## 2024-03-20 RX ADMIN — NICOTINE POLACRILEX 4 MG: 4 GUM, CHEWING ORAL at 14:04

## 2024-03-20 RX ADMIN — NICOTINE POLACRILEX 4 MG: 4 GUM, CHEWING ORAL at 22:25

## 2024-03-20 RX ADMIN — MELATONIN TAB 3 MG 3 MG: 3 TAB at 21:57

## 2024-03-20 RX ADMIN — METFORMIN HYDROCHLORIDE 500 MG: 500 TABLET ORAL at 08:26

## 2024-03-20 RX ADMIN — PROPRANOLOL HYDROCHLORIDE 10 MG: 10 TABLET ORAL at 08:26

## 2024-03-20 RX ADMIN — MINOCYCLINE HYDROCHLORIDE 100 MG: 100 CAPSULE ORAL at 21:56

## 2024-03-20 RX ADMIN — MINOCYCLINE HYDROCHLORIDE 100 MG: 100 CAPSULE ORAL at 08:25

## 2024-03-20 RX ADMIN — NICOTINE POLACRILEX 4 MG: 4 GUM, CHEWING ORAL at 15:55

## 2024-03-20 RX ADMIN — ATROPINE SULFATE 1 DROP: 10 SOLUTION/ DROPS OPHTHALMIC at 22:20

## 2024-03-20 RX ADMIN — KETOROLAC TROMETHAMINE 30 MG: 30 INJECTION, SOLUTION INTRAMUSCULAR; INTRAVENOUS at 06:49

## 2024-03-20 RX ADMIN — LABETALOL HYDROCHLORIDE 20 MG: 5 INJECTION, SOLUTION INTRAVENOUS at 06:50

## 2024-03-20 RX ADMIN — CLOZAPINE 450 MG: 25 TABLET ORAL at 21:57

## 2024-03-20 RX ADMIN — GLYCOPYRROLATE 0.2 MG: 0.2 INJECTION INTRAMUSCULAR; INTRAVENOUS at 06:50

## 2024-03-20 RX ADMIN — POLYETHYLENE GLYCOL 3350 17 G: 17 POWDER, FOR SOLUTION ORAL at 08:26

## 2024-03-20 RX ADMIN — ESMOLOL HYDROCHLORIDE 50 MG: 100 INJECTION, SOLUTION INTRAVENOUS at 06:50

## 2024-03-20 NOTE — PROGRESS NOTES
03/20/24 0857   Team Meeting   Meeting Type Daily Rounds   Team Members Present   Team Members Present Physician;Nurse;   Physician Team Member Mark   Nursing Team Member Earl   Care Management Team Member Marco   Patient/Family Present   Patient Present No   Patient's Family Present No     ECT 10 this morning. Pt reports the AH are lesser. Pt continues to report paranoia. Med/meal compliant. Dc tbd.

## 2024-03-20 NOTE — PROGRESS NOTES
Progress Note - Behavioral Health     Alberto Berumen 28 y.o. male MRN: [id number]   Unit/Bed#: [facility] 383-02 Encounter: [id number]    Documentation, nursing notes, medication reconciliation, labs, and vitals reviewed. Patient was seen for continuing care and reviewed with treatment team. No acute events over the past 24 hours.  Per nursing report, patient was noted more social last evening, talking with peers and talking on the phone.  Last evening denied hallucinations.    No medication changes over the past 24 hours. Continues to tolerate current medications with no adverse effects.  Continues to receive ECT treatment and seems to be tolerating better.      On evaluation today, he is seen in his room post-ECT.  He is tired And scant verbalizations.  He is a again endorsing auditory hallucinations.  But he continues to state he does feel ECT has been helping.  No suicidal ideation, plan, or intent.  Intermittent perceptual disturbances and does not exhibit any symptoms of aimee on evaluation.    Psychiatric ROS:  Behavior over the last 24 hours: minimally improved  Sleep: hypersomnia  Appetite: fair  Medication side effects: No   ROS: no complaints, all other systems are negative    Mental Status Evaluation:    Appearance:  age appropriate, marginal hygiene   Behavior:  guarded   Speech:  scant   Mood:  dysphoric   Affect:  constricted   Thought Process:  coherent   Associations: concrete associations   Thought Content:  persecutory delusions   Perceptual Disturbances: auditory hallucinations still present, but less frequent   Risk Potential: Suicidal ideation - None  Homicidal ideation - None  Potential for aggression - No   Sensorium:  oriented to person, place, and time/date   Memory:  recent and remote memory grossly intact   Consciousness:  alert and awake   Attention/Concentration: decreased concentration and decreased attention span   Insight:  limited   Judgment: limited   Gait/Station: normal gait/station,  normal balance   Motor Activity: no abnormal movements     Vital signs in last 24 hours:    Temp:  [97.4 °F (36.3 °C)-98.2 °F (36.8 °C)] 97.4 °F (36.3 °C)  HR:  [] 75  Resp:  [12-24] 17  BP: (100-175)/() 129/78    Laboratory results: I have personally reviewed all pertinent laboratory/tests results    Results from the past 24 hours: No results found for this or any previous visit (from the past 24 hour(s)).    Suicide/Homicide Risk Assessment:    Risk of Harm to Self:   Current Specific Risk Factors include: current depressive symptoms, presence of hallucinations  Protective Factors: no current suicidal ideation, ability to communicate with staff on the unit, able to contract for safety on the unit, taking medications as ordered on the unit  Based on today's assessment, Alberto presents the following risk of harm to self: minimal    Risk of Harm to Others:  Current Specific Risk Factors include: current psychotic symptoms  Protective Factors: psychotic symptoms are less prominent, compliant with medications on the unit as ordered  Based on today's assessment, Alberto presents the following risk of harm to others: minimal    The following interventions are recommended: behavioral checks every 7 minutes, continued hospitalization on locked unit    Progress Toward Goals: minimal improvement, depression is improving, still psychotic    Assessment/Plan   Principal Problem:    Schizoaffective disorder, bipolar type (HCC)  Active Problems:    GERD (gastroesophageal reflux disease)    Tobacco abuse    T wave inversion in EKG    Chronic idiopathic constipation    Vitamin B 12 deficiency    Vitamin D deficiency    Acanthosis nigricans    Rash    Class 2 obesity in adult      Recommended Treatment:     Planned medication and treatment changes:    All current active medications have been reviewed  Encourage group therapy, milieu therapy and occupational therapy  Behavioral Health checks every 7 minutes  Continue ECT  #11 in 2 days  Consider EAC referral    Check CMP on Monday  Monitor weekly CBC for Clozaril  Continue current medications:    Current Facility-Administered Medications   Medication Dose Route Frequency Provider Last Rate    acetaminophen  650 mg Oral Q6H PRN Yasmin Harvey MD      acetaminophen  650 mg Oral Q4H PRN Yasmin Harvey MD      acetaminophen  975 mg Oral Q6H PRN Yasmin Harvey MD      aluminum-magnesium hydroxide-simethicone  30 mL Oral Q4H PRN Yasmin Harvey MD      atropine  1 drop Sublingual HS HOLLI Anderson      atropine  1 drop Sublingual Daily PRN HOLLI Anderson      haloperidol lactate  2.5 mg Intramuscular Q4H PRN Max 4/day Yasmin Harvey MD      And    LORazepam  1 mg Intramuscular Q4H PRN Max 4/day Yasmin Harvey MD      And    benztropine  0.5 mg Intramuscular Q4H PRN Max 4/day Yasmin Harvey MD      haloperidol lactate  5 mg Intramuscular Q4H PRN Max 4/day Yasmin Harvey MD      And    LORazepam  2 mg Intramuscular Q4H PRN Max 4/day Yasmin Harvey MD      And    benztropine  1 mg Intramuscular Q4H PRN Max 4/day Yasmin Harvey MD      benztropine  1 mg Oral Q4H PRN Max 6/day Yasmin Harvey MD      bisacodyl  10 mg Rectal Daily PRN Yasmin Harvey MD      cloZAPine  450 mg Oral HS HOLLI Anderson      hydrOXYzine HCL  50 mg Oral Q6H PRN Max 4/day Yasmin Harvey MD      Or    diphenhydrAMINE  50 mg Intramuscular Q6H PRN Yasmin Harvey MD      diphenhydrAMINE-zinc acetate   Topical BID PRN Anjle Sagastume MD      escitalopram  20 mg Oral Daily La Flores MD      haloperidol  1 mg Oral Q6H PRN Yasmin Harvey MD      haloperidol  2.5 mg Oral Q4H PRN Max 4/day Yasmin Harvey MD      haloperidol  5 mg Oral Q4H PRN Max 4/day Yasmin Harvey MD      hydrALAZINE  5 mg Intravenous Q10 Min PRN Khoa Gutierrez CRNA      hydrocortisone   Topical 4x Daily PRN HOLLI Monge      hydrOXYzine HCL  100 mg Oral Q6H PRN Max 4/day  Yasmin Harvey MD      Or    LORazepam  2 mg Intramuscular Q6H PRN Yasmin Harvey MD      hydrOXYzine HCL  25 mg Oral Q6H PRN Max 4/day Yasmin Harvey MD      ibuprofen  600 mg Oral Q8H PRN HOLLI Monge      lactated ringers  100 mL/hr Intravenous Continuous lEisa Workman  mL/hr (03/15/24 0700)    lactated ringers  100 mL/hr Intravenous Continuous Elisa Workman MD      lactated ringers  125 mL/hr Intravenous Continuous Vikas Madison MD Stopped (03/15/24 0756)    lactated ringers  100 mL/hr Intravenous Continuous Anjel Arriaza CRNA      lactated ringers  100 mL/hr Intravenous Continuous Elisa Workman  mL/hr (03/13/24 0610)    lactated ringers  125 mL/hr Intravenous Continuous Anthony Fontana MD Stopped (03/15/24 0756)    lidocaine (PF)  0.5 mL Infiltration Once PRN Anthony Fontana MD      melatonin  3 mg Oral HS Yasmin Harvey MD      metFORMIN  500 mg Oral Daily With Breakfast HOLLI Monge      methocarbamol  500 mg Oral Q6H PRN HOLLI Monge      minocycline  100 mg Oral Q12H KAYLIE Eileen Jensen, HOLLI      nicotine polacrilex  4 mg Oral Q2H PRN Yasmin Harvey MD      ondansetron  4 mg Oral Q6H PRN HOLLI Galvan      polyethylene glycol  17 g Oral Daily PRN Yasmin Harvey MD      polyethylene glycol  17 g Oral Daily HOLLI Monge      propranolol  10 mg Oral Q12H KAYLIE HOLLI Anderson      senna-docusate sodium  1 tablet Oral Daily PRN Yasmin Harvey MD      senna-docusate sodium  2 tablet Oral BID La Flores MD      traZODone  50 mg Oral HS PRN Yasmin Harvey MD      white petrolatum-mineral oil   Topical TID PRN Anjel Sagastume MD       Risks / Benefits of Treatment:    Risks, benefits, and possible side effects of medications explained to patient and patient verbalizes understanding and agreement for treatment.  Discussed risks and benefits of Electroconvulsive Treatment with  patient including risks of anesthesia and memory loss. Alberto verbalized understanding and agreed for ECT.    Counseling / Coordination of Care:    Patient's progress discussed with staff in treatment team meeting.  Medications, treatment progress and treatment plan reviewed with patient.    HOLLI Anderson 03/20/24

## 2024-03-20 NOTE — NURSING NOTE
Patient is calm and cooperative, pleasant with staff. Medication and meal compliant. Denies SI/HI/VH, but continues to have AH. Patient reports voices are telling him that are are going to kill him and his family. Patient reports that he feels safe on unit. Denies any unmet needs at this time.

## 2024-03-20 NOTE — ANESTHESIA PREPROCEDURE EVALUATION
Procedure:  ELECTROCONVULSIVE THERAPY (ECT)    Prior ECT: 120 mg Brevital, 140 mg succinylcholine, 50 esmolol, labetalol 20 mg, Toradol 30 mg     ECG: ST with non specific T wave abnormality   Relevant Problems   GI/HEPATIC   (+) GERD (gastroesophageal reflux disease)             Anesthesia Plan  ASA Score- 2     Anesthesia Type- general with ASA Monitors.         Additional Monitors:     Airway Plan:            Plan Factors-    Chart reviewed. EKG reviewed.  Existing labs reviewed. Patient summary reviewed.                  Induction-     Postoperative Plan-     Informed Consent-

## 2024-03-20 NOTE — PLAN OF CARE
Problem: PSYCHOSIS  Goal: Will report no hallucinations or delusions  Description: Interventions:  - Administer medication as  ordered  - Every waking shifts and PRN assess for the presence of hallucinations and or delusions  - Assist with reality testing to support increasing orientation  - Assess if patient's hallucinations or delusions are encouraging self-harm or harm to others and intervene as appropriate  Outcome: Progressing     Problem: ANXIETY  Goal: Will report anxiety at manageable levels  Description: INTERVENTIONS:  - Administer medication as ordered  - Teach and encourage coping skills  - Provide emotional support  - Assess patient/family for anxiety and ability to cope  Outcome: Progressing     Problem: Ineffective Coping  Goal: Participates in unit activities  Description: Interventions:  - Provide therapeutic environment   - Provide required programming   - Redirect inappropriate behaviors   Outcome: Progressing     Problem: Depression  Goal: Treatment Goal: Demonstrate behavioral control of depressive symptoms, verbalize feelings of improved mood/affect, and adopt new coping skills prior to discharge  Outcome: Progressing  Goal: Verbalize thoughts and feelings  Description: Interventions:  - Assess and re-assess patient's level of risk   - Engage patient in 1:1 interactions, daily, for a minimum of 15 minutes   - Encourage patient to express feelings, fears, frustrations, hopes   Outcome: Progressing  Goal: Refrain from isolation  Description: Interventions:  - Develop a trusting relationship   - Encourage socialization   Outcome: Progressing  Goal: Refrain from self-neglect  Outcome: Progressing     Problem: Depression  Goal: Verbalize thoughts and feelings  Description: Interventions:  - Assess and re-assess patient's level of risk   - Engage patient in 1:1 interactions, daily, for a minimum of 15 minutes   - Encourage patient to express feelings, fears, frustrations, hopes   Outcome:  Progressing

## 2024-03-21 PROCEDURE — 99232 SBSQ HOSP IP/OBS MODERATE 35: CPT | Performed by: PSYCHIATRY & NEUROLOGY

## 2024-03-21 RX ADMIN — METFORMIN HYDROCHLORIDE 500 MG: 500 TABLET ORAL at 08:17

## 2024-03-21 RX ADMIN — SENNOSIDES AND DOCUSATE SODIUM 2 TABLET: 8.6; 5 TABLET ORAL at 17:05

## 2024-03-21 RX ADMIN — MELATONIN TAB 3 MG 3 MG: 3 TAB at 21:08

## 2024-03-21 RX ADMIN — NICOTINE POLACRILEX 4 MG: 4 GUM, CHEWING ORAL at 20:28

## 2024-03-21 RX ADMIN — ESCITALOPRAM OXALATE 20 MG: 10 TABLET ORAL at 08:17

## 2024-03-21 RX ADMIN — SENNOSIDES AND DOCUSATE SODIUM 2 TABLET: 8.6; 5 TABLET ORAL at 08:17

## 2024-03-21 RX ADMIN — CLOZAPINE 450 MG: 25 TABLET ORAL at 21:07

## 2024-03-21 RX ADMIN — NICOTINE POLACRILEX 4 MG: 4 GUM, CHEWING ORAL at 13:56

## 2024-03-21 RX ADMIN — MINOCYCLINE HYDROCHLORIDE 100 MG: 100 CAPSULE ORAL at 21:07

## 2024-03-21 RX ADMIN — PROPRANOLOL HYDROCHLORIDE 10 MG: 10 TABLET ORAL at 21:07

## 2024-03-21 RX ADMIN — PROPRANOLOL HYDROCHLORIDE 10 MG: 10 TABLET ORAL at 08:42

## 2024-03-21 RX ADMIN — NICOTINE POLACRILEX 4 MG: 4 GUM, CHEWING ORAL at 17:50

## 2024-03-21 RX ADMIN — MINOCYCLINE HYDROCHLORIDE 100 MG: 100 CAPSULE ORAL at 08:17

## 2024-03-21 RX ADMIN — ATROPINE SULFATE 1 DROP: 10 SOLUTION/ DROPS OPHTHALMIC at 21:08

## 2024-03-21 RX ADMIN — NICOTINE POLACRILEX 4 MG: 4 GUM, CHEWING ORAL at 12:02

## 2024-03-21 NOTE — PROGRESS NOTES
"Progress Note - Behavioral Health     Alberto Berumen 28 y.o. male MRN: [id number]   Unit/Bed#: -02 Encounter: [id number]    Documentation, nursing notes, medication reconciliation, labs, and vitals reviewed. Patient was seen for continuing care and reviewed with treatment team. No acute events over the past 24 hours.  Per nursing report, continues to report auditory hallucinations but reports they are less intrusive.  Attending groups and visible intermittently.     No medication changes over the past 24 hours. Continues to tolerate current medications with no adverse effects.  Continues with ECT treatments and tolerating better.      On evaluation today, he is seen in his room and resting in bed.  He continues to report auditory hallucinations are \"still there\" but less intrusive.  Notes improvement in mood.  However, still with paranoia to the point that he is fearful to go home, believing someone wishes to harm him and his family.    I did complete with Alberto the MMSE to assess for any memory issues related to ECT.  He scored 30/30.  I did also have Alberto complete the Mosley Depression Inventory today.  He scored 13/40, which indicates mild depression.  But interestingly, he self reports moderate depresssion - when asked to choose between \"mild, moderate, severe\".    He wishes to continue ECT beyond the 12 treatments.    Psychiatric ROS:  Behavior over the last 24 hours: unchanged  Sleep: hypersomnia  Appetite: fair  Medication side effects: Yes - tiredness    ROS: no complaints, all other systems are negative    Mental Status Evaluation:    Appearance:  marginal hygiene   Behavior:  calm, guarded   Speech:  slow   Mood:  depressed   Affect:  flat   Thought Process:  decreased rate of thoughts   Associations: concrete associations   Thought Content:  persecutory delusions   Perceptual Disturbances: auditory hallucinations still present, but less frequent   Risk Potential: Suicidal ideation - " None  Homicidal ideation - None  Potential for aggression - No   Sensorium:  oriented to person, place, and time/date   Memory:  recent and remote memory grossly intact   Consciousness:  alert and awake   Attention/Concentration: decreased concentration and decreased attention span   Insight:  limited   Judgment: limited   Gait/Station: normal gait/station, normal balance   Motor Activity: no abnormal movements     Vital signs in last 24 hours:    Temp:  [98.4 °F (36.9 °C)] 98.4 °F (36.9 °C)  HR:  [] 80  Resp:  [17] 17  BP: (117-143)/(69-93) 117/88    Laboratory results: I have personally reviewed all pertinent laboratory/tests results    Results from the past 24 hours: No results found for this or any previous visit (from the past 24 hour(s)).    Suicide/Homicide Risk Assessment:    Risk of Harm to Self:   Current Specific Risk Factors include: presence of paranoid ideation  Protective Factors: no current suicidal ideation, ability to communicate with staff on the unit, able to contract for safety on the unit, taking medications as ordered on the unit  Based on today's assessment, Alberto presents the following risk of harm to self: minimal    Risk of Harm to Others:  Current Specific Risk Factors include: current psychotic symptoms  Protective Factors: no current homicidal ideation, psychotic symptoms are less prominent, compliant with medications on the unit as ordered  Based on today's assessment, Alberto presents the following risk of harm to others: minimal    The following interventions are recommended: behavioral checks every 7 minutes, continued hospitalization on locked unit    Progress Toward Goals: minimal improvement    Assessment/Plan   Principal Problem:    Schizoaffective disorder, bipolar type ([facility])  Active Problems:    GERD (gastroesophageal reflux disease)    Tobacco abuse    T wave inversion in EKG    Chronic idiopathic constipation    Vitamin B 12 deficiency    Vitamin D deficiency     Acanthosis nigricans    Rash    Class 2 obesity in adult      Recommended Treatment:     Planned medication and treatment changes:    All current active medications have been reviewed  Encourage group therapy, milieu therapy and occupational therapy  Behavioral Health checks every 7 minutes  Extended Acute Care referral  Continue court-ordered ECT #11 tomorrow  Recheck CMP on Monday  Monitor weekly CBC for Clozaril  Continue all other medications:    Current Facility-Administered Medications   Medication Dose Route Frequency Provider Last Rate    acetaminophen  650 mg Oral Q6H PRN Yasmin Harvey MD      acetaminophen  650 mg Oral Q4H PRN Yasmin Harvey MD      acetaminophen  975 mg Oral Q6H PRN Yasmin Harvey MD      aluminum-magnesium hydroxide-simethicone  30 mL Oral Q4H PRN Yasmin Harvey MD      atropine  1 drop Sublingual HS HOLLI Anderson      atropine  1 drop Sublingual Daily PRN HOLLI Anderson      haloperidol lactate  2.5 mg Intramuscular Q4H PRN Max 4/day Yasmin Harvey MD      And    LORazepam  1 mg Intramuscular Q4H PRN Max 4/day Yasmin Harvey MD      And    benztropine  0.5 mg Intramuscular Q4H PRN Max 4/day Yasmin Harvey MD      haloperidol lactate  5 mg Intramuscular Q4H PRN Max 4/day Yasmin Harvey MD      And    LORazepam  2 mg Intramuscular Q4H PRN Max 4/day Yasmin Harvey MD      And    benztropine  1 mg Intramuscular Q4H PRN Max 4/day Yasmin Harvey MD      benztropine  1 mg Oral Q4H PRN Max 6/day Yasmin Harvey MD      bisacodyl  10 mg Rectal Daily PRN Yasmin Harvey MD      cloZAPine  450 mg Oral HS HOLLI Anderson      hydrOXYzine HCL  50 mg Oral Q6H PRN Max 4/day Yasmin Harvey MD      Or    diphenhydrAMINE  50 mg Intramuscular Q6H PRN Yasmin Harvey MD      diphenhydrAMINE-zinc acetate   Topical BID PRN Anjel Sagastume MD      escitalopram  20 mg Oral Daily La Flores MD      haloperidol  1 mg Oral Q6H PRN Yasmin Harvey MD       haloperidol  2.5 mg Oral Q4H PRN Max 4/day Yasmin Harvey MD      haloperidol  5 mg Oral Q4H PRN Max 4/day Yasmin Harvey MD      hydrALAZINE  5 mg Intravenous Q10 Min PRN Khoa Gutierrez CRNA      hydrocortisone   Topical 4x Daily PRN HOLLI Monge      hydrOXYzine HCL  100 mg Oral Q6H PRN Max 4/day Yasmin Harvey MD      Or    LORazepam  2 mg Intramuscular Q6H PRN Yasmin Harvey MD      hydrOXYzine HCL  25 mg Oral Q6H PRN Max 4/day Yasmin Harvey MD      ibuprofen  600 mg Oral Q8H PRN HOLLI Monge      lactated ringers  100 mL/hr Intravenous Continuous Elisa Workman  mL/hr (03/15/24 0700)    lactated ringers  100 mL/hr Intravenous Continuous Elisa Workman MD      lactated ringers  125 mL/hr Intravenous Continuous Vikas Madison MD Stopped (03/15/24 0756)    lactated ringers  100 mL/hr Intravenous Continuous Anjel Arriaza CRNA      lactated ringers  100 mL/hr Intravenous Continuous Elisa Workman  mL/hr (03/13/24 0610)    lactated ringers  125 mL/hr Intravenous Continuous Anthony Fontana MD Stopped (03/15/24 0756)    lidocaine (PF)  0.5 mL Infiltration Once PRN Anthony Fontana MD      melatonin  3 mg Oral HS Yasmin Harvey MD      metFORMIN  500 mg Oral Daily With Breakfast HOLLI Monge      methocarbamol  500 mg Oral Q6H PRN HOLLI Monge      minocycline  100 mg Oral Q12H KAYLIE HOLLI Monge      nicotine polacrilex  4 mg Oral Q2H PRN Yasmin Harvey MD      ondansetron  4 mg Oral Q6H PRN HOLLI Galvan      polyethylene glycol  17 g Oral Daily PRN Yasmin Harvey MD      polyethylene glycol  17 g Oral Daily HOLLI Monge      propranolol  10 mg Oral Q12H KAYLIE HOLLI Anderson      senna-docusate sodium  1 tablet Oral Daily PRN Yasmin Harvey MD      senna-docusate sodium  2 tablet Oral BID La Flores MD      traZODone  50 mg Oral HS PRN Yasmin JACKMAN  MD Candice      white petrolatum-mineral oil   Topical TID PRN Anjel Sagastume MD       Risks / Benefits of Treatment:    Risks, benefits, and possible side effects of medications explained to patient and patient verbalizes understanding and agreement for treatment.  Discussed risks and benefits of Electroconvulsive Treatment with patient including risks of anesthesia and memory loss. Alberto verbalized understanding and agreed for ECT.    Counseling / Coordination of Care:    Patient's progress discussed with staff in treatment team meeting.  Medications, treatment progress and treatment plan reviewed with patient.    HOLLI Anderson 03/21/24

## 2024-03-21 NOTE — NURSING NOTE
Pt is isolative to room, calm and cooperative upon approach. Pt is medication compliant, did not eat breakfast. Pt denies SI, HI, VH, endorses some AH. Denies any needs at this time.

## 2024-03-21 NOTE — NURSING NOTE
"Alberto is calm with a blunted affect reporting ongoing auditory hallucinations telling him that he and his family are going to die. He reports \"feeling out of it\" with memory impairment having trouble remembering recent events. Patient is aware this is a side effect of ECT. He denies current depression or anxiety. Given nicotine gum throughout the day. No unmet needs at this time.   "

## 2024-03-21 NOTE — PROGRESS NOTES
03/21/24 0838   Team Meeting   Meeting Type Daily Rounds   Team Members Present   Team Members Present Physician;Nurse;   Physician Team Member Mark   Nursing Team Member Phenix   Care Management Team Member Marco   Patient/Family Present   Patient Present No   Patient's Family Present No     Pt continues to report AH, but reports they are less intrusive. Pt attending group and visible intermittently. Med/meal compliant. Consider EAC referral. DC tbd.

## 2024-03-22 ENCOUNTER — APPOINTMENT (INPATIENT)
Dept: PREOP/PACU | Facility: HOSPITAL | Age: 29
DRG: 750 | End: 2024-03-22
Attending: PSYCHIATRY & NEUROLOGY
Payer: COMMERCIAL

## 2024-03-22 ENCOUNTER — ANESTHESIA EVENT (INPATIENT)
Dept: PREOP/PACU | Facility: HOSPITAL | Age: 29
DRG: 750 | End: 2024-03-22
Payer: COMMERCIAL

## 2024-03-22 ENCOUNTER — ANESTHESIA (INPATIENT)
Dept: PREOP/PACU | Facility: HOSPITAL | Age: 29
DRG: 750 | End: 2024-03-22
Payer: COMMERCIAL

## 2024-03-22 PROCEDURE — 90870 ELECTROCONVULSIVE THERAPY: CPT | Performed by: PSYCHIATRY & NEUROLOGY

## 2024-03-22 PROCEDURE — 90870 ELECTROCONVULSIVE THERAPY: CPT

## 2024-03-22 PROCEDURE — 99232 SBSQ HOSP IP/OBS MODERATE 35: CPT | Performed by: NURSE PRACTITIONER

## 2024-03-22 PROCEDURE — GZB2ZZZ ELECTROCONVULSIVE THERAPY, BILATERAL-SINGLE SEIZURE: ICD-10-PCS | Performed by: PSYCHIATRY & NEUROLOGY

## 2024-03-22 RX ORDER — GLYCOPYRROLATE 0.2 MG/ML
INJECTION INTRAMUSCULAR; INTRAVENOUS AS NEEDED
Status: DISCONTINUED | OUTPATIENT
Start: 2024-03-22 | End: 2024-03-22

## 2024-03-22 RX ORDER — SODIUM CHLORIDE, SODIUM LACTATE, POTASSIUM CHLORIDE, CALCIUM CHLORIDE 600; 310; 30; 20 MG/100ML; MG/100ML; MG/100ML; MG/100ML
125 INJECTION, SOLUTION INTRAVENOUS CONTINUOUS
OUTPATIENT
Start: 2024-03-22

## 2024-03-22 RX ORDER — SODIUM CHLORIDE, SODIUM LACTATE, POTASSIUM CHLORIDE, CALCIUM CHLORIDE 600; 310; 30; 20 MG/100ML; MG/100ML; MG/100ML; MG/100ML
INJECTION, SOLUTION INTRAVENOUS CONTINUOUS PRN
Status: DISCONTINUED | OUTPATIENT
Start: 2024-03-22 | End: 2024-03-22

## 2024-03-22 RX ORDER — KETOROLAC TROMETHAMINE 30 MG/ML
INJECTION, SOLUTION INTRAMUSCULAR; INTRAVENOUS AS NEEDED
Status: DISCONTINUED | OUTPATIENT
Start: 2024-03-22 | End: 2024-03-22

## 2024-03-22 RX ORDER — METHOHEXITAL IN WATER/PF 100MG/10ML
SYRINGE (ML) INTRAVENOUS AS NEEDED
Status: DISCONTINUED | OUTPATIENT
Start: 2024-03-22 | End: 2024-03-22

## 2024-03-22 RX ORDER — ESMOLOL HYDROCHLORIDE 10 MG/ML
INJECTION INTRAVENOUS AS NEEDED
Status: DISCONTINUED | OUTPATIENT
Start: 2024-03-22 | End: 2024-03-22

## 2024-03-22 RX ORDER — ETOMIDATE 2 MG/ML
INJECTION INTRAVENOUS AS NEEDED
Status: DISCONTINUED | OUTPATIENT
Start: 2024-03-22 | End: 2024-03-22

## 2024-03-22 RX ORDER — LABETALOL HYDROCHLORIDE 5 MG/ML
INJECTION, SOLUTION INTRAVENOUS AS NEEDED
Status: DISCONTINUED | OUTPATIENT
Start: 2024-03-22 | End: 2024-03-22

## 2024-03-22 RX ADMIN — POLYETHYLENE GLYCOL 3350 17 G: 17 POWDER, FOR SOLUTION ORAL at 08:10

## 2024-03-22 RX ADMIN — ATROPINE SULFATE 1 DROP: 10 SOLUTION/ DROPS OPHTHALMIC at 21:15

## 2024-03-22 RX ADMIN — LABETALOL HYDROCHLORIDE 20 MG: 5 INJECTION, SOLUTION INTRAVENOUS at 07:01

## 2024-03-22 RX ADMIN — SENNOSIDES AND DOCUSATE SODIUM 2 TABLET: 8.6; 5 TABLET ORAL at 17:56

## 2024-03-22 RX ADMIN — MINOCYCLINE HYDROCHLORIDE 100 MG: 100 CAPSULE ORAL at 08:10

## 2024-03-22 RX ADMIN — ESCITALOPRAM OXALATE 20 MG: 10 TABLET ORAL at 08:10

## 2024-03-22 RX ADMIN — NICOTINE POLACRILEX 4 MG: 4 GUM, CHEWING ORAL at 12:58

## 2024-03-22 RX ADMIN — GLYCOPYRROLATE 0.4 MG: 0.2 INJECTION INTRAMUSCULAR; INTRAVENOUS at 06:50

## 2024-03-22 RX ADMIN — PROPRANOLOL HYDROCHLORIDE 10 MG: 10 TABLET ORAL at 21:16

## 2024-03-22 RX ADMIN — METHOHEXITAL SODIUM 120 MG: 500 INJECTION, POWDER, LYOPHILIZED, FOR SOLUTION INTRAMUSCULAR; INTRAVENOUS; RECTAL at 07:01

## 2024-03-22 RX ADMIN — SENNOSIDES AND DOCUSATE SODIUM 2 TABLET: 8.6; 5 TABLET ORAL at 08:09

## 2024-03-22 RX ADMIN — MINOCYCLINE HYDROCHLORIDE 100 MG: 100 CAPSULE ORAL at 21:15

## 2024-03-22 RX ADMIN — NICOTINE POLACRILEX 4 MG: 4 GUM, CHEWING ORAL at 16:11

## 2024-03-22 RX ADMIN — MELATONIN TAB 3 MG 3 MG: 3 TAB at 21:15

## 2024-03-22 RX ADMIN — ETOMIDATE INJECTION 140 MG: 2 SOLUTION INTRAVENOUS at 07:01

## 2024-03-22 RX ADMIN — ESMOLOL HYDROCHLORIDE 50 MG: 10 INJECTION, SOLUTION INTRAVENOUS at 07:01

## 2024-03-22 RX ADMIN — CLOZAPINE 450 MG: 25 TABLET ORAL at 21:15

## 2024-03-22 RX ADMIN — KETOROLAC TROMETHAMINE 30 MG: 30 INJECTION, SOLUTION INTRAMUSCULAR; INTRAVENOUS at 06:50

## 2024-03-22 RX ADMIN — METFORMIN HYDROCHLORIDE 500 MG: 500 TABLET ORAL at 08:09

## 2024-03-22 RX ADMIN — SODIUM CHLORIDE, SODIUM LACTATE, POTASSIUM CHLORIDE, AND CALCIUM CHLORIDE: .6; .31; .03; .02 INJECTION, SOLUTION INTRAVENOUS at 06:40

## 2024-03-22 RX ADMIN — ACETAMINOPHEN 975 MG: 325 TABLET ORAL at 08:09

## 2024-03-22 RX ADMIN — PROPRANOLOL HYDROCHLORIDE 10 MG: 10 TABLET ORAL at 08:10

## 2024-03-22 RX ADMIN — NICOTINE POLACRILEX 4 MG: 4 GUM, CHEWING ORAL at 19:06

## 2024-03-22 NOTE — PROCEDURES
Procedure Note - ECT  Alberto Berumen 28 y.o. male MRN: [id number]    Time out was taken with staff to confirm correct patient and correct procedure to be performed.    Session Number: 010    Diagnosis: Principal Problem:    Schizoaffective disorder, bipolar type (HCC)  Active Problems:    GERD (gastroesophageal reflux disease)    Tobacco abuse    T wave inversion in EKG    Chronic idiopathic constipation    Vitamin B 12 deficiency    Vitamin D deficiency    Acanthosis nigricans    Rash    Class 2 obesity in adult      ECT Type: Inpatient, Acute    Anesthesia: Brevital     Electrode Placement: bilateral    Energy level: 90%      Seizure Duration     EE sec  EMG:n/a    PSI 39    Results:Clinical seizure was satisfactory, Patient tolerated ECT well, Complications: none    Vitals:    24 0600   BP: (!) 173/84   Pulse: 87   Resp: 20   Temp:    SpO2: 99%        Medication Administration - last 24 hours from 2024 0610 to 2024 0610         Date/Time Order Dose Route Action Action by     2024 EDT nicotine polacrilex (NICORETTE) gum 4 mg 4 mg Oral Given Mavis Covarrubias RN     2024 1750 EDT nicotine polacrilex (NICORETTE) gum 4 mg 4 mg Oral Given Srini Montes De Oca RN     2024 1356 EDT nicotine polacrilex (NICORETTE) gum 4 mg 4 mg Oral Given Amira Jefferson, MIGUEL     2024 1202 EDT nicotine polacrilex (NICORETTE) gum 4 mg 4 mg Oral Given Verónica Bailey LPN     2024 210 EDT melatonin tablet 3 mg 3 mg Oral Given Mavis Covarrubias RN     2024 0817 EDT polyethylene glycol (MIRALAX) packet 17 g 17 g Oral Not Given Amira Jefferson RN     2024 0817 EDT escitalopram (LEXAPRO) tablet 20 mg 20 mg Oral Given Amira Jefferson RN     2024 0817 EDT metFORMIN (GLUCOPHAGE) tablet 500 mg 500 mg Oral Given Amira Jefferson RN     2024 2107 EDT minocycline (MINOCIN) capsule 100 mg 100 mg Oral Given Mavis Covarrubias RN     2024 0817 EDT minocycline (MINOCIN)  capsule 100 mg 100 mg Oral Given Amira Jefferson RN     03/21/2024 2107 EDT propranolol (INDERAL) tablet 10 mg 10 mg Oral Given Mavis Covarrubias RN     03/21/2024 0842 EDT propranolol (INDERAL) tablet 10 mg 10 mg Oral Given Amira Jefferson RN     03/21/2024 2108 EDT atropine (ISOPTO ATROPINE) 1 % ophthalmic solution 1 drop 1 drop Sublingual Given Mavis Covarrubias RN     03/21/2024 1705 EDT senna-docusate sodium (SENOKOT S) 8.6-50 mg per tablet 2 tablet 2 tablet Oral Given Amira Jefferson RN     03/21/2024 0817 EDT senna-docusate sodium (SENOKOT S) 8.6-50 mg per tablet 2 tablet 2 tablet Oral Given Amira Jefferson RN     03/21/2024 2107 EDT cloZAPine (CLOZARIL) tablet 450 mg 450 mg Oral Given Mavis Covarrubias RN             Media Information      Document Information    Clinical Image - Mobile Device      03/20/2024 07:07   Attached To:   Hospital Encounter on 11/14/23   Source Information    Jose Paul MD  [facility]

## 2024-03-22 NOTE — PROGRESS NOTES
03/22/24 1004   Team Meeting   Meeting Type Daily Rounds   Team Members Present   Team Members Present Physician;Nurse;   Physician Team Member Mark   Nursing Team Member Giancarlo   Care Management Team Member Miguelito   Patient/Family Present   Patient Present No   Patient's Family Present No     Patient currently holds 201 status. Patient reports AH but denies all other symptoms. Patient is medication and meal compliant. Patient to continue on all current scheduled medications. Patient discharge date and time is to be determined.

## 2024-03-22 NOTE — PROGRESS NOTES
"Progress Note - Behavioral Health     Alberto Berumen 28 y.o. male MRN: [id number]   Unit/Bed#: U 383-02 Encounter: [id number]    Behavior over the last 24 hours: limited improvement.     Alberto is slightly less seclusive today. He came to the room for the session and has been slightly less seclusive. He still reports auditory hallucinations, but states that hallucinations are less prominent \"I can still hear them, but I am just not focused on them as much\". He denies current suicidal or homicidal thoughts. Limited group attendance. Has been taking medications.    Sleep: hypersomnia  Appetite: normal  Medication side effects: Yes - tiredness   ROS: reports tiredness, denies any shortness of breath or chest pain, all other systems are negative    Mental Status Evaluation:    Appearance:  casually dressed   Behavior:  cooperative, guarded, limited eye contact   Speech:  scant, soft   Mood:  depressed   Affect:  flat   Thought Process:  organized, concrete   Associations: concrete associations   Thought Content:  no overt delusions   Perceptual Disturbances: auditory hallucinations of \"voices saying that they will kill me\" still present, but less frequent, no visual hallucinations   Risk Potential: Suicidal ideation - None  Homicidal ideation - None  Potential for aggression - No   Sensorium:  oriented to person, place, and time/date   Memory:  recent and remote memory grossly intact   Consciousness:  alert and awake   Attention/Concentration: decreased concentration and decreased attention span   Insight:  limited   Judgment: limited   Gait/Station: normal gait/station, normal balance   Motor Activity: no abnormal movements     Vital signs in last 24 hours:    Temp:  [97.2 °F (36.2 °C)-99.1 °F (37.3 °C)] 98.9 °F (37.2 °C)  HR:  [] 104  Resp:  [16-18] 18  BP: (136-140)/(87-97) 136/87    Laboratory results: I have personally reviewed all pertinent laboratory/tests results    Results from the past 24 hours: No " results found for this or any previous visit (from the past 24 hour(s)).    Suicide/Homicide Risk Assessment:    Risk of Harm to Self:   Nursing Suicide Risk Assessment Last 24 hours: C-SSRS Risk (Since Last Contact)  Calculated C-SSRS Risk Score (Since Last Contact): No Risk Indicated  Current Specific Risk Factors include: mental illness diagnosis, current depressive symptoms, current psychotic symptoms, presence of hallucinations  Protective Factors: no current suicidal ideation, ability to communicate with staff on the unit, taking medications as ordered on the unit  Based on today's assessment, Alberto presents the following risk of harm to self: low    Risk of Harm to Others:  Nursing Homicide Risk Assessment: Violence Risk to Others: Denies within past 6 months  Based on today's assessment, Alberto presents the following risk of harm to others: low    The following interventions are recommended: behavioral checks every 7 minutes, continued hospitalization on locked unit    Progress Toward Goals: limited progress, still depressed, poor motivation, reports less prominent psychotic symptoms    Assessment/Plan   Principal Problem:    Schizoaffective disorder, bipolar type (HCC)  Active Problems:    GERD (gastroesophageal reflux disease)    Tobacco abuse    T wave inversion in EKG    Chronic idiopathic constipation    Vitamin B 12 deficiency    Vitamin D deficiency    Acanthosis nigricans    Rash    Class 2 obesity in adult      Recommended Treatment:     Planned medication and treatment changes:    All current active medications have been reviewed  Encourage group therapy, milieu therapy and occupational therapy  Behavioral Health checks every 7 minutes  Extended Acute Care referral  Continue ECT #12 on 3/25/2024 - maintenance ECT is being considered afterwards  Monitor CBC/diff weekly    Continue current medications:    Current Facility-Administered Medications   Medication Dose Route Frequency Provider Last  Rate    acetaminophen  650 mg Oral Q6H PRN Yasmin Harvey MD      acetaminophen  650 mg Oral Q4H PRN Yasmin Harvey MD      acetaminophen  975 mg Oral Q6H PRN Yasmin Harvey MD      aluminum-magnesium hydroxide-simethicone  30 mL Oral Q4H PRN Yasmin Harvey MD      atropine  1 drop Sublingual HS Prisca Villafuerte, CRJENNIE      atropine  1 drop Sublingual Daily PRN Prisca Villafuerte, HOLLI      haloperidol lactate  2.5 mg Intramuscular Q4H PRN Max 4/day Yasmin Harvey MD      And    LORazepam  1 mg Intramuscular Q4H PRN Max 4/day Yasmin Harvey MD      And    benztropine  0.5 mg Intramuscular Q4H PRN Max 4/day Yasmin Harvey MD      haloperidol lactate  5 mg Intramuscular Q4H PRN Max 4/day Yasmin Harvey MD      And    LORazepam  2 mg Intramuscular Q4H PRN Max 4/day Yasmin Harvey MD      And    benztropine  1 mg Intramuscular Q4H PRN Max 4/day Yasmin Harvey MD      benztropine  1 mg Oral Q4H PRN Max 6/day Yasmin Harvey MD      bisacodyl  10 mg Rectal Daily PRN Yasmin Harvey MD      cloZAPine  450 mg Oral HS Prisca Villafuerte, HOLLI      hydrOXYzine HCL  50 mg Oral Q6H PRN Max 4/day Yasmin Harvey MD      Or    diphenhydrAMINE  50 mg Intramuscular Q6H PRN Yasmin Harvey MD      diphenhydrAMINE-zinc acetate   Topical BID PRN Anjel Sagastume MD      escitalopram  20 mg Oral Daily La Flores MD      haloperidol  1 mg Oral Q6H PRN Yasmin Harvey MD      haloperidol  2.5 mg Oral Q4H PRN Max 4/day Yasmin Harvey MD      haloperidol  5 mg Oral Q4H PRN Max 4/day Yasmin Harvey MD      hydrALAZINE  5 mg Intravenous Q10 Min PRN Khoa Gutierrez CRNA      hydrocortisone   Topical 4x Daily PRN Eileen Jensen, HOLLI      hydrOXYzine HCL  100 mg Oral Q6H PRN Max 4/day Yasmin Harvey MD      Or    LORazepam  2 mg Intramuscular Q6H PRN Yasmin Harvey MD      hydrOXYzine HCL  25 mg Oral Q6H PRN Max 4/day Yasmin Harvey MD      ibuprofen  600 mg Oral Q8H PRN Eileen Holliday  HOLLI Jensen      lactated ringers  100 mL/hr Intravenous Continuous Elisa Workman  mL/hr (03/15/24 0700)    lactated ringers  100 mL/hr Intravenous Continuous Elisa Workman MD      lactated ringers  125 mL/hr Intravenous Continuous Vikas Madison MD Stopped (03/15/24 0756)    lactated ringers  100 mL/hr Intravenous Continuous Anjel Arriaza CRNA      lactated ringers  100 mL/hr Intravenous Continuous Elisa Workman  mL/hr (03/13/24 0610)    lactated ringers  125 mL/hr Intravenous Continuous Anthony Fontana MD Stopped (03/15/24 0756)    lidocaine (PF)  0.5 mL Infiltration Once PRN Anthony Fontana MD      melatonin  3 mg Oral HS Yasmin Harvey MD      metFORMIN  500 mg Oral Daily With Breakfast Eileen Cherrysathish Jensen, CRNP      methocarbamol  500 mg Oral Q6H PRN Eileen Cherrysathish Jensen, STEVENP      minocycline  100 mg Oral Q12H [facility] Eileen Cherrysathish Jensen, STEVENP      nicotine polacrilex  4 mg Oral Q2H PRN Yasmin Harvey MD      ondansetron  4 mg Oral Q6H PRN Pia Mantilla, HOLLI      polyethylene glycol  17 g Oral Daily PRN Yasmin Harvey MD      polyethylene glycol  17 g Oral Daily Eileen Holliday Mikey, STEVENP      propranolol  10 mg Oral Q12H [facility] Prisac Christo, CRNP      senna-docusate sodium  1 tablet Oral Daily PRN Yasmin Harvey MD      senna-docusate sodium  2 tablet Oral BID La Flores MD      traZODone  50 mg Oral HS PRN Yasmin Harvey MD      white petrolatum-mineral oil   Topical TID PRN Anjel Sagastume MD       Risks / Benefits of Treatment:    Risks, benefits, and possible side effects of medications explained to patient. Patient has limited understanding of risks and benefits of treatment at this time, but agrees to take medications as prescribed.    Counseling / Coordination of Care:    Patient's progress discussed with staff in treatment team meeting.  Medications, treatment progress and treatment plan reviewed with patient.    La Flores MD  03/23/24

## 2024-03-22 NOTE — NURSING NOTE
Patient reports AH. He denies SI, HI, and VHs. Denies anxiety and depression. Patient slept through breakfast, ate lunch with his peers. Patient is medication compliant. Visible on the unit and social with peers. He denies any needs at this time.

## 2024-03-22 NOTE — ANESTHESIA POSTPROCEDURE EVALUATION
Post-Op Assessment Note    CV Status:  Stable  Pain Score: 0    Pain management: adequate       Mental Status:  Alert   Hydration Status:  Stable   PONV Controlled:  Controlled   Airway Patency:  Patent     Post Op Vitals Reviewed: Yes    No anethesia notable event occurred.    Staff: CRNA               BP   156/91   Temp      Pulse  96   Resp   20   SpO2   100

## 2024-03-22 NOTE — NURSING NOTE
Patient arrived back to unit from ECT. Patient is alert and oriented x4. Patient reports headache for which he will be given tylenol.

## 2024-03-22 NOTE — ANESTHESIA PREPROCEDURE EVALUATION
Procedure:  ELECTROCONVULSIVE THERAPY (ECT)    Relevant Problems   GI/HEPATIC   (+) GERD (gastroesophageal reflux disease)      Behavioral Health   (+) Schizoaffective disorder, bipolar type (HCC)   (+) Tobacco abuse      FEN/Gastrointestinal   (+) Chronic idiopathic constipation      Dermatology   (+) Syringoma      Other   (+) Class 2 obesity in adult   (+) T wave inversion in EKG      Lab Results   Component Value Date    SODIUM 136 01/11/2024    K 4.1 01/11/2024     01/11/2024    CO2 27 01/11/2024    AGAP 7 01/11/2024    BUN 12 01/11/2024    CREATININE 0.90 01/11/2024    GLUC 97 01/11/2024    GLUF 97 01/11/2024    CALCIUM 9.4 01/11/2024    AST 23 01/11/2024    ALT 50 01/11/2024    ALKPHOS 76 01/11/2024    TP 7.1 01/11/2024    TBILI 0.43 01/11/2024    EGFR 116 01/11/2024      Lab Results   Component Value Date    WBC 9.61 03/18/2024    HGB 12.1 03/18/2024    HCT 40.2 03/18/2024    MCV 80 (L) 03/18/2024     03/18/2024              Anesthesia Plan  ASA Score- 2     Anesthesia Type- general with ASA Monitors.         Additional Monitors:     Airway Plan:            Plan Factors-Exercise tolerance (METS): >4 METS.    Chart reviewed. EKG reviewed. Imaging results reviewed. Existing labs reviewed. Patient summary reviewed.                  Induction- intravenous.    Postoperative Plan-     Informed Consent- Anesthetic plan and risks discussed with patient.  I personally reviewed this patient with the CRNA. Discussed and agreed on the Anesthesia Plan with the CRNA..

## 2024-03-22 NOTE — NURSING NOTE
"Patient is visible on unit, sitting in dayroom, watching tv with peers, walking in the hallway, socializing with peers. Patient is calm, cooperative, and compliant with medications. Patient continues to endorse hearing voices but reports the voices are \"less\". Patient reports that \"ECT is helping\". Patient continues to utilize Nicorett gum. No further complaints or unmet needs identified. Q 7 minute safety checks maintained.   "

## 2024-03-22 NOTE — PROGRESS NOTES
"Progress Note - Behavioral Health     Alberto Berumen 28 y.o. male MRN: [id number]   Unit/Bed#: [facility] 383-02 Encounter: [id number]    Documentation, nursing notes, medication reconciliation, labs, and vitals reviewed. Patient was seen for continuing care and reviewed with treatment team. No acute events over the past 24 hours.  Per nursing report, more social last evening and reporting auditory hallucinations were \"less \".  Related to staff he felt that ECT was helping.  Tired today after ECT.    No medication changes over the past 24 hours. Continues to tolerate current medications with no adverse effects.  Continues with ECT treatments with some tiredness and headache on ECT days.     On evaluation today, is seen in his room and resting in bed.  Admits to being tired.  Continues to endorse decrease in voices over the past week or so.  Still with paranoia and fearful about going home.  Still presents flat and depressed and poorly motivated but verbalizes some improvement in that area as well.  Wishes to continue ECT be on the originally scheduled 12 treatments.    No suicidal ideation, plan, or intent. Decrease in perceptual disturbances and does not exhibit any symptoms of aimee on evaluation.    Psychiatric ROS:  Behavior over the last 24 hours: unchanged  Sleep: hypersomnia  Appetite: fair  Medication side effects: No   ROS: no complaints, all other systems are negative    Mental Status Evaluation:    Appearance:  age appropriate   Behavior:  pleasant, cooperative   Speech:  normal rate, normal volume   Mood:  depressed   Affect:  flat   Thought Process:  goal directed   Associations: intact associations   Thought Content:  no overt delusions   Perceptual Disturbances: auditory hallucinations still present, but less frequent   Risk Potential: Suicidal ideation - None  Homicidal ideation - None  Potential for aggression - No   Sensorium:  oriented to person, place, and time/date   Memory:  recent and remote memory " grossly intact   Consciousness:  alert and awake   Attention/Concentration: decreased concentration and decreased attention span   Insight:  limited   Judgment: limited   Gait/Station: normal gait/station, normal balance   Motor Activity: no abnormal movements     Vital signs in last 24 hours:    Temp:  [97.5 °F (36.4 °C)-98.6 °F (37 °C)] 97.5 °F (36.4 °C)  HR:  [] 85  Resp:  [12-20] 18  BP: (107-173)/(60-97) 142/60    Laboratory results: I have personally reviewed all pertinent laboratory/tests results    Results from the past 24 hours: No results found for this or any previous visit (from the past 24 hour(s)).    Suicide/Homicide Risk Assessment:    Risk of Harm to Self:   Current Specific Risk Factors include: diagnosis of depression, presence of hallucinations, presence of paranoid ideation  Protective Factors: no current suicidal ideation, ability to communicate with staff on the unit, able to contract for safety on the unit, taking medications as ordered on the unit  Based on today's assessment, Alberto presents the following risk of harm to self: minimal    Risk of Harm to Others:  Current Specific Risk Factors include: current psychotic symptoms  Protective Factors: no current homicidal ideation, psychotic symptoms are less prominent, compliant with medications on the unit as ordered  Based on today's assessment, Alberto presents the following risk of harm to others: minimal    The following interventions are recommended: behavioral checks every 7 minutes, continued hospitalization on locked unit    Progress Toward Goals: some improvement    Assessment/Plan   Principal Problem:    Schizoaffective disorder, bipolar type (HCC)  Active Problems:    GERD (gastroesophageal reflux disease)    Tobacco abuse    T wave inversion in EKG    Chronic idiopathic constipation    Vitamin B 12 deficiency    Vitamin D deficiency    Acanthosis nigricans    Rash    Class 2 obesity in adult      Recommended Treatment:      Planned medication and treatment changes:    All current active medications have been reviewed  Encourage group therapy, milieu therapy and occupational therapy  Behavioral Health checks every 7 minutes  Extended Acute Care referral  Continue ECT #12 on Monday  Recheck CMP on Monday  Monitor weekly CBC for clots  Continue current medications:    Current Facility-Administered Medications   Medication Dose Route Frequency Provider Last Rate    acetaminophen  650 mg Oral Q6H PRN Yasmin Harvey MD      acetaminophen  650 mg Oral Q4H PRN Yasmin Harvey MD      acetaminophen  975 mg Oral Q6H PRN Yasmin Harvey MD      aluminum-magnesium hydroxide-simethicone  30 mL Oral Q4H PRN Yasmin Harvey MD      atropine  1 drop Sublingual HS HOLLI Anderson      atropine  1 drop Sublingual Daily PRN HOLLI Anderson      haloperidol lactate  2.5 mg Intramuscular Q4H PRN Max 4/day Yasmin Harvey MD      And    LORazepam  1 mg Intramuscular Q4H PRN Max 4/day Yasmin Harvey MD      And    benztropine  0.5 mg Intramuscular Q4H PRN Max 4/day Yasmin Harvey MD      haloperidol lactate  5 mg Intramuscular Q4H PRN Max 4/day Yasmin Harvey MD      And    LORazepam  2 mg Intramuscular Q4H PRN Max 4/day Yasmin Harvey MD      And    benztropine  1 mg Intramuscular Q4H PRN Max 4/day Yasmin Harvey MD      benztropine  1 mg Oral Q4H PRN Max 6/day Yasmin Harvey MD      bisacodyl  10 mg Rectal Daily PRN Yasmin Harvey MD      cloZAPine  450 mg Oral HS HOLLI Anderson      hydrOXYzine HCL  50 mg Oral Q6H PRN Max 4/day Yasmin Harvey MD      Or    diphenhydrAMINE  50 mg Intramuscular Q6H PRN Yasmin Harvey MD      diphenhydrAMINE-zinc acetate   Topical BID PRN Anjel Sagastume MD      escitalopram  20 mg Oral Daily La Flores MD      haloperidol  1 mg Oral Q6H PRN Yasmin Harvey MD      haloperidol  2.5 mg Oral Q4H PRN Max 4/day Yasmin Harvey MD      haloperidol  5 mg Oral Q4H PRN Max  4/day Yasmin Harvey MD      hydrALAZINE  5 mg Intravenous Q10 Min PRN Khoa Norman Gutierrez CRNA      hydrocortisone   Topical 4x Daily PRN HOLLI Monge      hydrOXYzine HCL  100 mg Oral Q6H PRN Max 4/day Yasmin Harvey MD      Or    LORazepam  2 mg Intramuscular Q6H PRN Yasmin Harvey MD      hydrOXYzine HCL  25 mg Oral Q6H PRN Max 4/day Yasmin Harvey MD      ibuprofen  600 mg Oral Q8H PRN HOLLI Monge      lactated ringers  100 mL/hr Intravenous Continuous Elisa Workman  mL/hr (03/15/24 0700)    lactated ringers  100 mL/hr Intravenous Continuous Elisa Workman MD      lactated ringers  125 mL/hr Intravenous Continuous Vikas Madison MD Stopped (03/15/24 0756)    lactated ringers  100 mL/hr Intravenous Continuous Anjel Arriaza CRNA      lactated ringers  100 mL/hr Intravenous Continuous Elisa Workman  mL/hr (03/13/24 0610)    lactated ringers  125 mL/hr Intravenous Continuous Anthony Fontana MD Stopped (03/15/24 0756)    lidocaine (PF)  0.5 mL Infiltration Once PRN Anthony Fontana MD      melatonin  3 mg Oral HS Yasmin Harvey MD      metFORMIN  500 mg Oral Daily With Breakfast HOLLI Monge      methocarbamol  500 mg Oral Q6H PRN HOLLI Monge      minocycline  100 mg Oral Q12H [facility] HOLLI Monge      nicotine polacrilex  4 mg Oral Q2H PRN Yasmin Harvey MD      ondansetron  4 mg Oral Q6H PRN HOLLI Galvan      polyethylene glycol  17 g Oral Daily PRN Yasmin Harvey MD      polyethylene glycol  17 g Oral Daily HOLLI Monge      propranolol  10 mg Oral Q12H [facility] HOLLI Anderson      senna-docusate sodium  1 tablet Oral Daily PRN Yasmin Harvey MD      senna-docusate sodium  2 tablet Oral BID MD shagufta FontaineZODone  50 mg Oral HS PRN Yasmin Harvey MD      white petrolatum-mineral oil   Topical TID PRN Anjel Sagastume MD       Risks / Benefits of  Treatment:    Risks, benefits, and possible side effects of medications explained to patient and patient verbalizes understanding and agreement for treatment.  Discussed risks and benefits of Electroconvulsive Treatment with patient including risks of anesthesia and memory loss. Alberto verbalized understanding and agreed for ECT.    Counseling / Coordination of Care:    Patient's progress discussed with staff in treatment team meeting.  Medications, treatment progress and treatment plan reviewed with patient.    HOLLI Anderson 03/22/24

## 2024-03-22 NOTE — PROCEDURES
Procedure Note - ECT  Alberto Berumen 28 y.o. male MRN: [id number]    Time out was taken with staff to confirm correct patient and correct procedure to be performed.  Patient stated that today his voices not as strong, he feels more like himself, still has severe depression.  No side effects of ECT reported    Session Number: 011    Diagnosis: Principal Problem:    Schizoaffective disorder, bipolar type ([facility])  Active Problems:    GERD (gastroesophageal reflux disease)    Tobacco abuse    T wave inversion in EKG    Chronic idiopathic constipation    Vitamin B 12 deficiency    Vitamin D deficiency    Acanthosis nigricans    Rash    Class 2 obesity in adult      ECT Type: Inpatient, Acute    Anesthesia: Brevital     Electrode Placement: bilateral    Energy level: 100%      Seizure Duration     EE sec  EMG:sec    PSI 89%    Results:Clinical seizure was satisfactory, Patient tolerated ECT well, Complications: none    Vitals:    24 1557   BP: 138/88   Pulse: 97   Resp: 18   Temp: 98.6 °F (37 °C)   SpO2: 99%        Medication Administration - last 24 hours from 2024 1710 to 2024 1710         Date/Time Order Dose Route Action Action by     2024 1611 EDT nicotine polacrilex (NICORETTE) gum 4 mg 4 mg Oral Given Amalia Mckoy RN     2024 1258 EDT nicotine polacrilex (NICORETTE) gum 4 mg 4 mg Oral Given Irais Franco, MIGUEL     2024 EDT nicotine polacrilex (NICORETTE) gum 4 mg 4 mg Oral Given Mavis Covarrubias RN     2024 1750 EDT nicotine polacrilex (NICORETTE) gum 4 mg 4 mg Oral Given Srini Montes De Oca, MIGUEL     2024 2108 EDT melatonin tablet 3 mg 3 mg Oral Given Mavis Covarrubias RN     2024 0809 EDT acetaminophen (TYLENOL) tablet 975 mg 975 mg Oral Given Amalia Mckoy RN     2024 0810 EDT polyethylene glycol (MIRALAX) packet 17 g 17 g Oral Given Amalia Mckoy RN     2024 0810 EDT escitalopram (LEXAPRO) tablet 20 mg 20 mg Oral Given Amalia  MIGUEL Mckoy     03/22/2024 0809 EDT metFORMIN (GLUCOPHAGE) tablet 500 mg 500 mg Oral Given Amalia Mckoy RN     03/22/2024 0810 EDT minocycline (MINOCIN) capsule 100 mg 100 mg Oral Given Amalia Mckoy RN     03/21/2024 2107 EDT minocycline (MINOCIN) capsule 100 mg 100 mg Oral Given Mavis Covarrubias RN     03/22/2024 0810 EDT propranolol (INDERAL) tablet 10 mg 10 mg Oral Given Amalia Mckoy RN     03/21/2024 2107 EDT propranolol (INDERAL) tablet 10 mg 10 mg Oral Given Mavis Covarrubias RN     03/21/2024 2108 EDT atropine (ISOPTO ATROPINE) 1 % ophthalmic solution 1 drop 1 drop Sublingual Given Mavis Covarrubias RN     03/22/2024 0809 EDT senna-docusate sodium (SENOKOT S) 8.6-50 mg per tablet 2 tablet 2 tablet Oral Given Amalia Mckoy RN     03/21/2024 2107 EDT cloZAPine (CLOZARIL) tablet 450 mg 450 mg Oral Given Mavis Covarrubias RN             Media Information      Document Information    Clinical Image - Mobile Device      03/22/2024 08:46   Attached To:   Hospital Encounter on 11/14/23   Source Information    Jose Paul MD  [facility]

## 2024-03-23 PROCEDURE — 99232 SBSQ HOSP IP/OBS MODERATE 35: CPT | Performed by: PSYCHIATRY & NEUROLOGY

## 2024-03-23 RX ADMIN — NICOTINE POLACRILEX 4 MG: 4 GUM, CHEWING ORAL at 17:48

## 2024-03-23 RX ADMIN — PROPRANOLOL HYDROCHLORIDE 10 MG: 10 TABLET ORAL at 11:33

## 2024-03-23 RX ADMIN — MINOCYCLINE HYDROCHLORIDE 100 MG: 100 CAPSULE ORAL at 11:34

## 2024-03-23 RX ADMIN — MINOCYCLINE HYDROCHLORIDE 100 MG: 100 CAPSULE ORAL at 21:24

## 2024-03-23 RX ADMIN — SENNOSIDES AND DOCUSATE SODIUM 2 TABLET: 8.6; 5 TABLET ORAL at 11:33

## 2024-03-23 RX ADMIN — POLYETHYLENE GLYCOL 3350 17 G: 17 POWDER, FOR SOLUTION ORAL at 11:34

## 2024-03-23 RX ADMIN — CLOZAPINE 450 MG: 25 TABLET ORAL at 21:23

## 2024-03-23 RX ADMIN — ATROPINE SULFATE 1 DROP: 10 SOLUTION/ DROPS OPHTHALMIC at 21:24

## 2024-03-23 RX ADMIN — MELATONIN TAB 3 MG 3 MG: 3 TAB at 21:24

## 2024-03-23 RX ADMIN — PROPRANOLOL HYDROCHLORIDE 10 MG: 10 TABLET ORAL at 21:24

## 2024-03-23 RX ADMIN — SENNOSIDES AND DOCUSATE SODIUM 2 TABLET: 8.6; 5 TABLET ORAL at 17:47

## 2024-03-23 RX ADMIN — NICOTINE POLACRILEX 4 MG: 4 GUM, CHEWING ORAL at 15:40

## 2024-03-23 RX ADMIN — NICOTINE POLACRILEX 4 MG: 4 GUM, CHEWING ORAL at 11:36

## 2024-03-23 RX ADMIN — NICOTINE POLACRILEX 4 MG: 4 GUM, CHEWING ORAL at 21:24

## 2024-03-23 RX ADMIN — ESCITALOPRAM OXALATE 20 MG: 10 TABLET ORAL at 11:34

## 2024-03-23 NOTE — PLAN OF CARE
Problem: PSYCHOSIS  Goal: Will report no hallucinations or delusions  Description: Interventions:  - Administer medication as  ordered  - Every waking shifts and PRN assess for the presence of hallucinations and or delusions  - Assist with reality testing to support increasing orientation  - Assess if patient's hallucinations or delusions are encouraging self-harm or harm to others and intervene as appropriate  Outcome: Progressing     Problem: ANXIETY  Goal: Will report anxiety at manageable levels  Description: INTERVENTIONS:  - Administer medication as ordered  - Teach and encourage coping skills  - Provide emotional support  - Assess patient/family for anxiety and ability to cope  Outcome: Progressing     Problem: Ineffective Coping  Goal: Participates in unit activities  Description: Interventions:  - Provide therapeutic environment   - Provide required programming   - Redirect inappropriate behaviors   Outcome: Not Progressing     Problem: Depression  Goal: Treatment Goal: Demonstrate behavioral control of depressive symptoms, verbalize feelings of improved mood/affect, and adopt new coping skills prior to discharge  Outcome: Progressing  Goal: Verbalize thoughts and feelings  Description: Interventions:  - Assess and re-assess patient's level of risk   - Engage patient in 1:1 interactions, daily, for a minimum of 15 minutes   - Encourage patient to express feelings, fears, frustrations, hopes   Outcome: Progressing  Goal: Refrain from isolation  Description: Interventions:  - Develop a trusting relationship   - Encourage socialization   Outcome: Progressing  Goal: Refrain from self-neglect  Outcome: Progressing     Problem: Electroconvulsive therapy (ECT)  Goal: Treatment Goal: Demonstrate a reduction of confusion and improved orientation to person, place, time and/or situation upon discharge, according to optimum baseline/ability  Outcome: Progressing  Goal: Verbalizes/displays adequate comfort level or  baseline comfort level  Description: Interventions:  - Encourage patient to monitor pain and request assistance  - Assess pain using appropriate pain scale  - Administer analgesics based on type and severity of pain and evaluate response  - Implement non-pharmacological measures as appropriate and evaluate response  - Consider cultural and social influences on pain and pain management  - Notify physician/advanced practitioner if interventions unsuccessful or patient reports new pain  Outcome: Progressing  Goal: Achieves stable or improved neurological status  Description: INTERVENTIONS  - Monitor and report changes in neurological status  - Monitor vital signs such as temperature, blood pressure, glucose, and any other labs ordered   - Initiate measures to prevent increased intracranial pressure  - Monitor for seizure activity and implement precautions if appropriate      Outcome: Progressing  Goal: Achieves maximal functionality and self care  Description: INTERVENTIONS  - Monitor swallowing and airway patency with patient fatigue and changes in neurological status  - Encourage and assist patient to increase activity and self care.   - Encourage visually impaired, hearing impaired and aphasic patients to use assistive/communication devices  Outcome: Progressing  Goal: Maintain or return mobility to safest level of function  Description: INTERVENTIONS:  - Assess patient's ability to carry out ADLs; assess patient's baseline for ADL function and identify physical deficits which impact ability to perform ADLs (bathing, care of mouth/teeth, toileting, grooming, dressing, etc.)  - Assess/evaluate cause of self-care deficits   - Assess range of motion  - Assess patient's mobility  - Assess patient's need for assistive devices and provide as appropriate  - Encourage maximum independence but intervene and supervise when necessary  - Involve family in performance of ADLs  - Assess for home care needs following discharge   -  Consider OT consult to assist with ADL evaluation and planning for discharge  - Provide patient education as appropriate  Outcome: Progressing  Goal: Absence of urinary retention  Description: INTERVENTIONS:  - Assess patient’s ability to void and empty bladder  - Monitor I/O  - Bladder scan as needed  - Discuss with physician/AP medications to alleviate retention as needed  - Discuss catheterization for long term situations as appropriate  Outcome: Progressing  Goal: Minimal or absence of nausea and/or vomiting  Description: INTERVENTIONS:  - Administer IV fluids if ordered to ensure adequate hydration  - Maintain NPO status until nausea and vomiting are resolved  - Nasogastric tube if ordered  - Administer ordered antiemetic medications as needed  - Provide nonpharmacologic comfort measures as appropriate  - Advance diet as tolerated, if ordered  - Consider nutrition services referral to assist patient with adequate nutrition and appropriate food choices  Outcome: Progressing  Goal: Maintains adequate nutritional intake  Description: INTERVENTIONS:  - Monitor percentage of each meal consumed  - Identify factors contributing to decreased intake, treat as appropriate  - Assist with meals as needed  - Monitor I&O, weight, and lab values if indicated  - Obtain nutrition services referral as needed  Outcome: Progressing

## 2024-03-23 NOTE — PROGRESS NOTES
"Progress Note - Behavioral Health     Alberto Berumen 28 y.o. male MRN: [id number]   Unit/Bed#: -02 Encounter: [id number]    Behavior over the last 24 hours: unchanged.     Alberto is again seclusive to his room this morning and not willing to get up. He states that auditory hallucinations \"are the same\". Some depression is still noted, flat affect and limited eye contact. He denies suicidal or homicidal thoughts. Compliant with medications.    Sleep: hypersomnia  Appetite: normal  Medication side effects: Yes - tiredness   ROS: reports tiredness, denies any shortness of breath, chest pain, or constipation, all other systems are negative    Mental Status Evaluation:    Appearance:  casually dressed   Behavior:  guarded, poor eye contact   Speech:  scant, soft   Mood:  depressed   Affect:  flat   Thought Process:  organized, concrete   Associations: concrete associations   Thought Content:  no overt delusions   Perceptual Disturbances: auditory hallucinations, no visual hallucinations   Risk Potential: Suicidal ideation - None at present  Homicidal ideation - None  Potential for aggression - No   Sensorium:  oriented to person, place, and time/date   Memory:  recent and remote memory grossly intact   Consciousness:  alert and awake   Attention/Concentration: decreased concentration and decreased attention span   Insight:  limited   Judgment: limited   Gait/Station: normal gait/station, normal balance   Motor Activity: no abnormal movements     Vital signs in last 24 hours:    Temp:  [98 °F (36.7 °C)-98.9 °F (37.2 °C)] 98 °F (36.7 °C)  HR:  [] 98  Resp:  [18] 18  BP: (136-151)/(85-98) 150/85    Laboratory results: I have personally reviewed all pertinent laboratory/tests results    Results from the past 24 hours: No results found for this or any previous visit (from the past 24 hour(s)).    Suicide/Homicide Risk Assessment:    Risk of Harm to Self:   Nursing Suicide Risk Assessment Last 24 hours: C-SSRS " Risk (Since Last Contact)  Calculated C-SSRS Risk Score (Since Last Contact): No Risk Indicated  Current Specific Risk Factors include: mental illness diagnosis, current psychotic symptoms, poor self care  Protective Factors: no current suicidal ideation, ability to communicate with staff on the unit, taking medications as ordered on the unit  Based on today's assessment, Alberto presents the following risk of harm to self: low    Risk of Harm to Others:  Nursing Homicide Risk Assessment: Violence Risk to Others: Denies within past 6 months  Based on today's assessment, Alberto presents the following risk of harm to others: low    The following interventions are recommended: behavioral checks every 7 minutes, continued hospitalization on locked unit    Progress Toward Goals: no improvement today, still depressed, poor motivation, denies current suicidal thoughts, still reports psychotic symptoms    Assessment/Plan   Principal Problem:    Schizoaffective disorder, bipolar type (HCC)  Active Problems:    GERD (gastroesophageal reflux disease)    Tobacco abuse    T wave inversion in EKG    Chronic idiopathic constipation    Vitamin B 12 deficiency    Vitamin D deficiency    Acanthosis nigricans    Rash    Class 2 obesity in adult      Recommended Treatment:     Planned medication and treatment changes:    All current active medications have been reviewed  Encourage group therapy, milieu therapy and occupational therapy  Behavioral Health checks every 7 minutes  Extended Acute Care referral  Continue ECT #12 tomorrow - maintenance ECT to be considered  Monitor CBC/diff weekly    Continue current medications:    Current Facility-Administered Medications   Medication Dose Route Frequency Provider Last Rate    acetaminophen  650 mg Oral Q6H PRN Yasmin Harvey MD      acetaminophen  650 mg Oral Q4H PRN Yasmin Harvey MD      acetaminophen  975 mg Oral Q6H PRN Yasmin Harvey MD      aluminum-magnesium  hydroxide-simethicone  30 mL Oral Q4H PRN Yasmin Harvey MD      atropine  1 drop Sublingual HS HOLLI Anderson      atropine  1 drop Sublingual Daily PRN HOLLI Anderson      haloperidol lactate  2.5 mg Intramuscular Q4H PRN Max 4/day Yasmin Harvey MD      And    LORazepam  1 mg Intramuscular Q4H PRN Max 4/day Yasmin Harvey MD      And    benztropine  0.5 mg Intramuscular Q4H PRN Max 4/day Yasmin Harvey MD      haloperidol lactate  5 mg Intramuscular Q4H PRN Max 4/day Yasmin Harvey MD      And    LORazepam  2 mg Intramuscular Q4H PRN Max 4/day Yasmin Harvey MD      And    benztropine  1 mg Intramuscular Q4H PRN Max 4/day Yasmin Harvey MD      benztropine  1 mg Oral Q4H PRN Max 6/day Yasmin Harvey MD      bisacodyl  10 mg Rectal Daily PRN Yasmin Harvey MD      cloZAPine  450 mg Oral HS HOLLI Anderson      hydrOXYzine HCL  50 mg Oral Q6H PRN Max 4/day Yasmin Harvey MD      Or    diphenhydrAMINE  50 mg Intramuscular Q6H PRN Yasmin Harvey MD      diphenhydrAMINE-zinc acetate   Topical BID PRN Anjel Sagastume MD      escitalopram  20 mg Oral Daily La Flores MD      haloperidol  1 mg Oral Q6H PRN Yasmin Harvey MD      haloperidol  2.5 mg Oral Q4H PRN Max 4/day Yasmin Harvey MD      haloperidol  5 mg Oral Q4H PRN Max 4/day Yasmin Harvey MD      hydrALAZINE  5 mg Intravenous Q10 Min PRN Khoa Gutierrez CRNA      hydrocortisone   Topical 4x Daily PRN HOLLI Monge      hydrOXYzine HCL  100 mg Oral Q6H PRN Max 4/day Yasmin Harvey MD      Or    LORazepam  2 mg Intramuscular Q6H PRN Yasmin Harvey MD      hydrOXYzine HCL  25 mg Oral Q6H PRN Max 4/day Yasmin Harvey MD      ibuprofen  600 mg Oral Q8H PRN HOLLI Monge      lactated ringers  100 mL/hr Intravenous Continuous Elisa Workman  mL/hr (03/15/24 0700)    lactated ringers  100 mL/hr Intravenous Continuous MD river Ariasd  ringers  125 mL/hr Intravenous Continuous Vikas Madison MD Stopped (03/15/24 0756)    lactated ringers  100 mL/hr Intravenous Continuous Anjel Arriaza CRNA      lactated ringers  100 mL/hr Intravenous Continuous Elisa Workman  mL/hr (03/13/24 0610)    lactated ringers  125 mL/hr Intravenous Continuous Anthony Fontana MD Stopped (03/15/24 0756)    lidocaine (PF)  0.5 mL Infiltration Once PRN Anthony Fontana MD      melatonin  3 mg Oral HS Yasmin Harvey MD      metFORMIN  500 mg Oral Daily With Breakfast Eileen Cherrysathish Jensen, CRNP      methocarbamol  500 mg Oral Q6H PRN Eileen Miya Jensen, CRNP      minocycline  100 mg Oral Q12H [facility] Eileenaddie Jensen, CRNP      nicotine polacrilex  4 mg Oral Q2H PRN Yasmin Harvey MD      ondansetron  4 mg Oral Q6H PRN Pia Mantilla, CRNP      polyethylene glycol  17 g Oral Daily PRN Yasmin Harvey MD      polyethylene glycol  17 g Oral Daily Eileen Cherrysathish Jensen, CRNP      propranolol  10 mg Oral Q12H [facility] Prisca Villafuerte, HOLLI      senna-docusate sodium  1 tablet Oral Daily PRN Yasmin Harvey MD      senna-docusate sodium  2 tablet Oral BID La Flores MD      traZODone  50 mg Oral HS PRN Yasmin Harvey MD      white petrolatum-mineral oil   Topical TID PRN Anjel Sagastume MD       Risks / Benefits of Treatment:    Risks, benefits, and possible side effects of medications explained to patient. Patient has limited understanding of risks and benefits of treatment at this time, but agrees to take medications as prescribed.    Counseling / Coordination of Care:    Patient's progress discussed with staff in treatment team meeting.  Medications, treatment progress and treatment plan reviewed with patient.    La Flores MD 03/24/24

## 2024-03-23 NOTE — NURSING NOTE
"Patient denies any SI, HI, and VH's.  Patient stated AH's states \"they are going to kill him.\"  Denies anxiety and depression.  Patient is medication compliant.  Patient skipped breakfast.  Denies and needs at this time.  "

## 2024-03-24 PROCEDURE — 99232 SBSQ HOSP IP/OBS MODERATE 35: CPT | Performed by: PSYCHIATRY & NEUROLOGY

## 2024-03-24 RX ADMIN — NICOTINE POLACRILEX 4 MG: 4 GUM, CHEWING ORAL at 12:51

## 2024-03-24 RX ADMIN — NICOTINE POLACRILEX 4 MG: 4 GUM, CHEWING ORAL at 16:07

## 2024-03-24 RX ADMIN — PROPRANOLOL HYDROCHLORIDE 10 MG: 10 TABLET ORAL at 11:16

## 2024-03-24 RX ADMIN — MELATONIN TAB 3 MG 3 MG: 3 TAB at 21:25

## 2024-03-24 RX ADMIN — NICOTINE POLACRILEX 4 MG: 4 GUM, CHEWING ORAL at 18:09

## 2024-03-24 RX ADMIN — ESCITALOPRAM OXALATE 20 MG: 10 TABLET ORAL at 11:17

## 2024-03-24 RX ADMIN — SENNOSIDES AND DOCUSATE SODIUM 2 TABLET: 8.6; 5 TABLET ORAL at 11:16

## 2024-03-24 RX ADMIN — MINOCYCLINE HYDROCHLORIDE 100 MG: 100 CAPSULE ORAL at 20:40

## 2024-03-24 RX ADMIN — SENNOSIDES AND DOCUSATE SODIUM 2 TABLET: 8.6; 5 TABLET ORAL at 17:45

## 2024-03-24 RX ADMIN — CLOZAPINE 450 MG: 25 TABLET ORAL at 21:24

## 2024-03-24 RX ADMIN — MINOCYCLINE HYDROCHLORIDE 100 MG: 100 CAPSULE ORAL at 11:17

## 2024-03-24 RX ADMIN — POLYETHYLENE GLYCOL 3350 17 G: 17 POWDER, FOR SOLUTION ORAL at 11:17

## 2024-03-24 RX ADMIN — NICOTINE POLACRILEX 4 MG: 4 GUM, CHEWING ORAL at 20:40

## 2024-03-24 RX ADMIN — ATROPINE SULFATE 1 DROP: 10 SOLUTION/ DROPS OPHTHALMIC at 21:25

## 2024-03-24 RX ADMIN — PROPRANOLOL HYDROCHLORIDE 10 MG: 10 TABLET ORAL at 20:40

## 2024-03-24 NOTE — NURSING NOTE
"Patient denies any SI, HI, and VH's.  Patient stated AH's stated\"They are going to kill him.\"  Denies anxiety and depression.  Patient is medication compliant.  Patient skipped breakfast.  Denies and needs at this time.  "

## 2024-03-24 NOTE — NURSING NOTE
Patient calm and cooperative, medication compliant. Patient denies SI/HI/VH however endorses AH, but states they are less and he is not as focused on them as much.

## 2024-03-25 ENCOUNTER — ANESTHESIA (INPATIENT)
Dept: PREOP/PACU | Facility: HOSPITAL | Age: 29
DRG: 750 | End: 2024-03-25
Payer: COMMERCIAL

## 2024-03-25 ENCOUNTER — APPOINTMENT (INPATIENT)
Dept: PREOP/PACU | Facility: HOSPITAL | Age: 29
DRG: 750 | End: 2024-03-25
Attending: PSYCHIATRY & NEUROLOGY
Payer: COMMERCIAL

## 2024-03-25 ENCOUNTER — ANESTHESIA EVENT (INPATIENT)
Dept: PREOP/PACU | Facility: HOSPITAL | Age: 29
DRG: 750 | End: 2024-03-25
Payer: COMMERCIAL

## 2024-03-25 LAB
ALBUMIN SERPL BCP-MCNC: 3.8 G/DL (ref 3.5–5)
ALP SERPL-CCNC: 80 U/L (ref 34–104)
ALT SERPL W P-5'-P-CCNC: 44 U/L (ref 7–52)
ANION GAP SERPL CALCULATED.3IONS-SCNC: 9 MMOL/L (ref 4–13)
AST SERPL W P-5'-P-CCNC: 21 U/L (ref 13–39)
BASOPHILS # BLD AUTO: 0.06 THOUSANDS/ÂΜL (ref 0–0.1)
BASOPHILS NFR BLD AUTO: 1 % (ref 0–1)
BILIRUB SERPL-MCNC: 0.3 MG/DL (ref 0.2–1)
BUN SERPL-MCNC: 16 MG/DL (ref 5–25)
CALCIUM SERPL-MCNC: 9.2 MG/DL (ref 8.4–10.2)
CHLORIDE SERPL-SCNC: 104 MMOL/L (ref 96–108)
CO2 SERPL-SCNC: 28 MMOL/L (ref 21–32)
CREAT SERPL-MCNC: 0.97 MG/DL (ref 0.6–1.3)
EOSINOPHIL # BLD AUTO: 0.29 THOUSAND/ÂΜL (ref 0–0.61)
EOSINOPHIL NFR BLD AUTO: 3 % (ref 0–6)
ERYTHROCYTE [DISTWIDTH] IN BLOOD BY AUTOMATED COUNT: 13 % (ref 11.6–15.1)
GFR SERPL CREATININE-BSD FRML MDRD: 106 ML/MIN/1.73SQ M
GLUCOSE SERPL-MCNC: 84 MG/DL (ref 65–140)
HCT VFR BLD AUTO: 40.8 % (ref 36.5–49.3)
HGB BLD-MCNC: 12.4 G/DL (ref 12–17)
IMM GRANULOCYTES # BLD AUTO: 0.05 THOUSAND/UL (ref 0–0.2)
IMM GRANULOCYTES NFR BLD AUTO: 1 % (ref 0–2)
LYMPHOCYTES # BLD AUTO: 3.6 THOUSANDS/ÂΜL (ref 0.6–4.47)
LYMPHOCYTES NFR BLD AUTO: 37 % (ref 14–44)
MCH RBC QN AUTO: 24 PG (ref 26.8–34.3)
MCHC RBC AUTO-ENTMCNC: 30.4 G/DL (ref 31.4–37.4)
MCV RBC AUTO: 79 FL (ref 82–98)
MONOCYTES # BLD AUTO: 0.92 THOUSAND/ÂΜL (ref 0.17–1.22)
MONOCYTES NFR BLD AUTO: 9 % (ref 4–12)
NEUTROPHILS # BLD AUTO: 4.89 THOUSANDS/ÂΜL (ref 1.85–7.62)
NEUTS SEG NFR BLD AUTO: 49 % (ref 43–75)
NRBC BLD AUTO-RTO: 0 /100 WBCS
PLATELET # BLD AUTO: 231 THOUSANDS/UL (ref 149–390)
PMV BLD AUTO: 11.1 FL (ref 8.9–12.7)
POTASSIUM SERPL-SCNC: 4.1 MMOL/L (ref 3.5–5.3)
PROT SERPL-MCNC: 6.8 G/DL (ref 6.4–8.4)
RBC # BLD AUTO: 5.17 MILLION/UL (ref 3.88–5.62)
SODIUM SERPL-SCNC: 141 MMOL/L (ref 135–147)
WBC # BLD AUTO: 9.81 THOUSAND/UL (ref 4.31–10.16)

## 2024-03-25 PROCEDURE — GZB2ZZZ ELECTROCONVULSIVE THERAPY, BILATERAL-SINGLE SEIZURE: ICD-10-PCS | Performed by: PSYCHIATRY & NEUROLOGY

## 2024-03-25 PROCEDURE — 80053 COMPREHEN METABOLIC PANEL: CPT | Performed by: NURSE PRACTITIONER

## 2024-03-25 PROCEDURE — 90870 ELECTROCONVULSIVE THERAPY: CPT | Performed by: PSYCHIATRY & NEUROLOGY

## 2024-03-25 PROCEDURE — 90870 ELECTROCONVULSIVE THERAPY: CPT

## 2024-03-25 PROCEDURE — 85025 COMPLETE CBC W/AUTO DIFF WBC: CPT | Performed by: NURSE PRACTITIONER

## 2024-03-25 RX ORDER — SODIUM CHLORIDE, SODIUM LACTATE, POTASSIUM CHLORIDE, CALCIUM CHLORIDE 600; 310; 30; 20 MG/100ML; MG/100ML; MG/100ML; MG/100ML
100 INJECTION, SOLUTION INTRAVENOUS CONTINUOUS
OUTPATIENT
Start: 2024-03-25

## 2024-03-25 RX ORDER — LABETALOL HYDROCHLORIDE 5 MG/ML
INJECTION, SOLUTION INTRAVENOUS AS NEEDED
Status: DISCONTINUED | OUTPATIENT
Start: 2024-03-25 | End: 2024-03-25

## 2024-03-25 RX ORDER — ONDANSETRON 2 MG/ML
4 INJECTION INTRAMUSCULAR; INTRAVENOUS ONCE AS NEEDED
OUTPATIENT
Start: 2024-03-25

## 2024-03-25 RX ORDER — KETOROLAC TROMETHAMINE 30 MG/ML
INJECTION, SOLUTION INTRAMUSCULAR; INTRAVENOUS AS NEEDED
Status: DISCONTINUED | OUTPATIENT
Start: 2024-03-25 | End: 2024-03-25

## 2024-03-25 RX ORDER — ESMOLOL HYDROCHLORIDE 10 MG/ML
INJECTION INTRAVENOUS AS NEEDED
Status: DISCONTINUED | OUTPATIENT
Start: 2024-03-25 | End: 2024-03-25

## 2024-03-25 RX ORDER — GLYCOPYRROLATE 0.2 MG/ML
INJECTION INTRAMUSCULAR; INTRAVENOUS AS NEEDED
Status: DISCONTINUED | OUTPATIENT
Start: 2024-03-25 | End: 2024-03-25

## 2024-03-25 RX ORDER — ALBUTEROL SULFATE 2.5 MG/3ML
2.5 SOLUTION RESPIRATORY (INHALATION) ONCE AS NEEDED
OUTPATIENT
Start: 2024-03-25

## 2024-03-25 RX ORDER — METHOHEXITAL IN WATER/PF 100MG/10ML
SYRINGE (ML) INTRAVENOUS AS NEEDED
Status: DISCONTINUED | OUTPATIENT
Start: 2024-03-25 | End: 2024-03-25

## 2024-03-25 RX ORDER — SODIUM CHLORIDE, SODIUM LACTATE, POTASSIUM CHLORIDE, CALCIUM CHLORIDE 600; 310; 30; 20 MG/100ML; MG/100ML; MG/100ML; MG/100ML
INJECTION, SOLUTION INTRAVENOUS CONTINUOUS PRN
Status: DISCONTINUED | OUTPATIENT
Start: 2024-03-25 | End: 2024-03-25

## 2024-03-25 RX ADMIN — POLYETHYLENE GLYCOL 3350 17 G: 17 POWDER, FOR SOLUTION ORAL at 08:24

## 2024-03-25 RX ADMIN — Medication 120 MG: at 07:10

## 2024-03-25 RX ADMIN — MELATONIN TAB 3 MG 3 MG: 3 TAB at 21:25

## 2024-03-25 RX ADMIN — SENNOSIDES AND DOCUSATE SODIUM 2 TABLET: 8.6; 5 TABLET ORAL at 08:23

## 2024-03-25 RX ADMIN — PROPRANOLOL HYDROCHLORIDE 10 MG: 10 TABLET ORAL at 21:25

## 2024-03-25 RX ADMIN — SODIUM CHLORIDE, SODIUM LACTATE, POTASSIUM CHLORIDE, AND CALCIUM CHLORIDE: .6; .31; .03; .02 INJECTION, SOLUTION INTRAVENOUS at 06:53

## 2024-03-25 RX ADMIN — NICOTINE POLACRILEX 4 MG: 4 GUM, CHEWING ORAL at 17:26

## 2024-03-25 RX ADMIN — PROPRANOLOL HYDROCHLORIDE 10 MG: 10 TABLET ORAL at 08:23

## 2024-03-25 RX ADMIN — ATROPINE SULFATE 1 DROP: 10 SOLUTION/ DROPS OPHTHALMIC at 21:25

## 2024-03-25 RX ADMIN — NICOTINE POLACRILEX 4 MG: 4 GUM, CHEWING ORAL at 15:59

## 2024-03-25 RX ADMIN — SENNOSIDES AND DOCUSATE SODIUM 2 TABLET: 8.6; 5 TABLET ORAL at 17:51

## 2024-03-25 RX ADMIN — GLYCOPYRROLATE 0.4 MG: 0.2 INJECTION INTRAMUSCULAR; INTRAVENOUS at 07:09

## 2024-03-25 RX ADMIN — NICOTINE POLACRILEX 4 MG: 4 GUM, CHEWING ORAL at 20:04

## 2024-03-25 RX ADMIN — KETOROLAC TROMETHAMINE 30 MG: 30 INJECTION, SOLUTION INTRAMUSCULAR; INTRAVENOUS at 07:06

## 2024-03-25 RX ADMIN — NICOTINE POLACRILEX 4 MG: 4 GUM, CHEWING ORAL at 22:11

## 2024-03-25 RX ADMIN — MINOCYCLINE HYDROCHLORIDE 100 MG: 100 CAPSULE ORAL at 08:23

## 2024-03-25 RX ADMIN — Medication 50 MG: at 07:10

## 2024-03-25 RX ADMIN — SODIUM CHLORIDE, SODIUM LACTATE, POTASSIUM CHLORIDE, AND CALCIUM CHLORIDE: .6; .31; .03; .02 INJECTION, SOLUTION INTRAVENOUS at 07:00

## 2024-03-25 RX ADMIN — METFORMIN HYDROCHLORIDE 500 MG: 500 TABLET ORAL at 08:23

## 2024-03-25 RX ADMIN — ESCITALOPRAM OXALATE 20 MG: 10 TABLET ORAL at 08:23

## 2024-03-25 RX ADMIN — LABETALOL 20 MG/4 ML (5 MG/ML) INTRAVENOUS SYRINGE 20 MG: at 07:13

## 2024-03-25 RX ADMIN — CLOZAPINE 450 MG: 25 TABLET ORAL at 21:25

## 2024-03-25 RX ADMIN — MINOCYCLINE HYDROCHLORIDE 100 MG: 100 CAPSULE ORAL at 21:25

## 2024-03-25 NOTE — PROCEDURES
Procedure Note - ECT  Alberto Berumen 28 y.o. male MRN: [id number]    Time out was taken with staff to confirm correct patient and correct procedure to be performed.    Session Number: 012    Diagnosis: Principal Problem:    Schizoaffective disorder, bipolar type (HCC)  Active Problems:    GERD (gastroesophageal reflux disease)    Tobacco abuse    T wave inversion in EKG    Chronic idiopathic constipation    Vitamin B 12 deficiency    Vitamin D deficiency    Acanthosis nigricans    Rash    Class 2 obesity in adult      ECT Type: Inpatient    Anesthesia: Methohexital    Electrode Placement: Bilateral    Energy level:  100 %      Seizure Duration     EE Sec.    EMG : 23 Sec    Post-ictal Suppression Index:NA    Results:Clinical seizure was satisfactory, Patient tolerated ECT well     Media Information    Document Information    Clinical Image - Mobile Device   ECT   2024 07:15   Attached To:   Hospital Encounter on 23   Source Information    Ted Acevedo MD   [facility]     Vitals:    24 0900   BP: 144/99   Pulse: 70   Resp: 16   Temp: 97.9 °F (36.6 °C)   SpO2: 100%        Medication Administration - last 24 hours from 2024 0955 to 2024 0955         Date/Time Order Dose Route Action Action by     2024 2040 EDT nicotine polacrilex (NICORETTE) gum 4 mg 4 mg Oral Given Abundio Salcedo RN     2024 1809 EDT nicotine polacrilex (NICORETTE) gum 4 mg 4 mg Oral Given Eileen Mendoza     2024 1607 EDT nicotine polacrilex (NICORETTE) gum 4 mg 4 mg Oral Given Eileen Mendoza     2024 1251 EDT nicotine polacrilex (NICORETTE) gum 4 mg 4 mg Oral Given Eileen Reji     2024 2125 EDT melatonin tablet 3 mg 3 mg Oral Given Abundio Salcedo RN     2024 0824 EDT polyethylene glycol (MIRALAX) packet 17 g 17 g Oral Given Srini Montes De Oca RN     2024 0824 EDT polyethylene glycol (MIRALAX) packet 17 g 17 g Oral Given Srini Montes De Oca RN      03/24/2024 1117 EDT polyethylene glycol (MIRALAX) packet 17 g 17 g Oral Given Eileen Mendoza     03/25/2024 0823 EDT escitalopram (LEXAPRO) tablet 20 mg 20 mg Oral Given Srini Montes De Oca RN     03/24/2024 1117 EDT escitalopram (LEXAPRO) tablet 20 mg 20 mg Oral Given Eileen Mendoza     03/25/2024 0823 EDT metFORMIN (GLUCOPHAGE) tablet 500 mg 500 mg Oral Given Srini Montes De Oca RN     03/24/2024 1119 EDT metFORMIN (GLUCOPHAGE) tablet 500 mg 500 mg Oral Not Given Eileen Mendoza     03/25/2024 0823 EDT minocycline (MINOCIN) capsule 100 mg 100 mg Oral Given Srini Montes De Oca RN     03/24/2024 2040 EDT minocycline (MINOCIN) capsule 100 mg 100 mg Oral Given Abundio Salcedo RN     03/24/2024 1117 EDT minocycline (MINOCIN) capsule 100 mg 100 mg Oral Given Eileen Mendoza     03/25/2024 0823 EDT propranolol (INDERAL) tablet 10 mg 10 mg Oral Given Srini Montes De Oca RN     03/24/2024 2040 EDT propranolol (INDERAL) tablet 10 mg 10 mg Oral Given Abundio Salcedo RN     03/24/2024 1116 EDT propranolol (INDERAL) tablet 10 mg 10 mg Oral Given Eileen Mendoza     03/24/2024 2125 EDT atropine (ISOPTO ATROPINE) 1 % ophthalmic solution 1 drop 1 drop Sublingual Given Abundio Salcedo RN     03/25/2024 0823 EDT senna-docusate sodium (SENOKOT S) 8.6-50 mg per tablet 2 tablet 2 tablet Oral Given Srini Montes De Oca RN     03/24/2024 1745 EDT senna-docusate sodium (SENOKOT S) 8.6-50 mg per tablet 2 tablet 2 tablet Oral Given Eileen Mendoza     03/24/2024 1116 EDT senna-docusate sodium (SENOKOT S) 8.6-50 mg per tablet 2 tablet 2 tablet Oral Given Eileen Mendoza     03/24/2024 2124 EDT cloZAPine (CLOZARIL) tablet 450 mg 450 mg Oral Given Abundio Salcedo RN

## 2024-03-25 NOTE — NURSING NOTE
Pt verbally denies SI and HI. Compliant with meds, observed in dayroom eating breakfast. Reported no negative symptoms related to ECT this morning, appeared mildly sluggish in cognition. Dressed in personal attire by choice, socializing effectively with others. Continues to report complaints of continued AH of negative voices about family. Is otherwise cooperative.

## 2024-03-25 NOTE — NURSING NOTE
[facility]) Physicians  Internal Medicine  Patient Encounter  Gennette Landau[facility] D.O., [facility]          Chief Complaint   Patient presents with    Pre-op Exam     Left knee arthorplasty on 10/12/2021 with Dr. Caroline Cloud @ [facility], [facility].       HPI-- 59 y.o. female presents today for a preoperative physical.  Pt diagnosed with sever left knee osteoarthritis. Pt states she has had progressively worsening pain over the last 5-6 years. The pain is now affecting her ADLs. She reports increased pain with weightbearing, walking, stairs, getting in and out of a car. The knee can lock and buckle. Pt has failed outpatient conservative treatments including steroid injections and physical therapy. She his not able to take NSAID's due to CKD. She is now scheduled for left TKA. I have been asked to see patient for pre-operative risk assessment and clearance. Surgery scheduled for 10/12/2021 by Dr. Chelsea Marte at [facility]    H/O DVT-- Pt has had recurrent DVT. The last DVT was in the setting of right TKA in 2013. She has saphenofemoral venous reflux/Chronic venous insufficiency. She is on Xarelto. She is under the care of Dr. Geeta Velazquez. She does wear compression stockings intermittently. She does have compression pumps at home ready to go for after surgery. CKD-- Stage 3. She sees Dr. Rm Esquivel. She denies foamy or frothy urine. Lab Results   Component Value Date    BUN 23 (H) 03/23/2021      Lab Results   Component Value Date    CREATININE 1.3 (H) 03/23/2021      Type 2 DM-- Her home sugars have been all over the board. Sugars spiked after steroid knee injections and back injection. She endorses no symptoms of glucose toxicity. She denies blurry vision. Yest AM-- 154, before bed 226. Lab Results   Component Value Date    LABA1C 8.3 05/10/2021     Lab Results   Component Value Date    .5 05/10/2021     HTN-- She denies new problems with HA, dizziness.   She denies Patient calm and cooperative, medication compliant. Patient denies HI/HI/VH at this time, continues to endorse AH. Denies any unmet needs.   lightheadedness, syncope, CP, SOB. Hypothyroidism-- She denies problems with constipation, diarrhea, cold or heat intolerance, hair loss, muscle cramps, tremor, edema or palpitations. She is compliant with the medication. Lab Results   Component Value Date    TSH 1.34 05/10/2021      GERD--Patient denies any active heartburn or dysphagia. She is on a daily PPI therapy. H2 blockers have been ineffective.   Lab Results   Component Value Date    MG 2.00 05/10/2021        Medical/Surgical Histories     Past Medical History:   Diagnosis Date    Acute superficial venous thrombosis of lower extremity, right 03/05/2019    Arthritis     Cataract, bilateral 2020    Chronic superficial venous thrombosis of left lower extremity 08/13/2015    CKD (chronic kidney disease) stage 2, GFR 60-89 ml/min     CKD (chronic kidney disease) stage 3, GFR 30-59 ml/min ([facility])     COVID-19 virus infection     Cystic kidney disease     DVT (deep venous thrombosis) ([address] 4/2003, 5/2013    Gastroesophageal reflux disease without esophagitis 11/28/2016    H/O deep venous thrombosis     H/O simple renal cyst     Hypertension     IBS (irritable bowel syndrome)     Left retinal detachment 09/2020    Osteoarthritis, knee     Saphenofemoral venous reflux 08/13/2015    BIlateral    Squamous cell carcinoma of skin of lower limb, left     Steatosis of liver     SVT (supraventricular tachycardia) ([address] 03/05/2019    Thyroid disease     Type 2 diabetes mellitus ([address]     Venous insufficiency           Past Surgical History:   Procedure Laterality Date    BREAST SURGERY      left fibroid tumor    CHOLECYSTECTOMY  04/2003    COLONOSCOPY  02/2015    Dr. Kendell Kirkpatrick  01/1999    KNEE ARTHROSCOPY  03/2015    right with medial meninsectomy    KNEE ARTHROSCOPY      right x 3-4     MOHS SURGERY  07/01/2019    Dr. Leesa Samson Left 09/30/2020    X 3 LAST ONE IN 2/2021    RETINAL DETACHMENT SURGERY Left 10/24/2020    RETINAL DETACHMENT SURGERY Left 2021    TONSILLECTOMY      TOTAL KNEE ARTHROPLASTY  05/2013    right    VARICOSE VEIN SURGERY  1980's. No reported problems with general anesthesia    Medications/Allergies     Medication Sig    SITagliptin (JANUVIA) 50 MG tablet Take 1 tablet by mouth daily    irbesartan (AVAPRO) 150 MG tablet TAKE 1 TABLET DAILY    rosuvastatin (CRESTOR) 10 MG tablet TAKE 1 TABLET BY MOUTH EVERY DAY AT NIGHT    esomeprazole (NEXIUM) 40 MG delayed release capsule TAKE 1 CAPSULE DAILY IN THE MORNING BEFORE BREAKFAST    levothyroxine (SYNTHROID) 150 MCG tablet TAKE 1 TABLET DAILY    furosemide (LASIX) 20 MG tablet TAKE 1 TABLET DAILY    rivaroxaban (XARELTO) 10 MG TABS tablet Take 10 mg by mouth    Lancets MISC 1 each by Does not apply route 2 times daily Please dispense ONE TOUCH Lancets  Test BS BID    ONETOUCH ULTRA strip TEST 2 TIMES A DAY & AS NEEDED FOR SYMPTOMS OF IRREGULAR BLOOD GLUCOSE.    ONETOUCH DELICA LANCETS FINE MISC USE AS DIRECTED TO TEST BLOOD SUGAR ONCE EVERY DAY         Allergies   Allergen Reactions    Codeine Other (See Comments)     hallucinations    Morphine And Related          Substance Use History     Social History     Tobacco Use    Smoking status: Never Smoker    Smokeless tobacco: Never Used   Substance Use Topics    Alcohol use: No    Drug use: No          Family History     Family History   Problem Relation Age of Onset    High Blood Pressure Mother     Osteoporosis Mother         No family history of problems with general anesthesia, venous thrombosis, or bleeding dyscrasias. REVIEW OF SYSTEMS:    CONSTITUTIONAL:  Neg   Recent weight changes, fatigue, fever, chills or night sweats, anorexia, Sleep difficulties  EYES: Neg  Blurry vision, loss of vision, double vision, tearing, itching, eye pain. EARS:  Neg Hearing loss, tinnitus, vertigo, discharge or otalgia.   NOSE:  Neg  Rhinorrhea, sneezing, itching, allergy, epistaxis. + Snoring  MOUTH/THROAT:  Neg Bleeding gums, hoarseness, sore throat, dysphagia, throat infections, or dentures  RESPIRATORY:  Neg SOB ,wheeze, cough, sputum, hemoptysis. No report of + TB test.    CARDIOVASCULAR:  Neg Chest pain, palpitations, heart murmur, dyspnea on exertion, orthopnea, paroxysmal nocturnal dyspnea.  + LE edema. GASTROINTESTINAL:  Neg   Nausea, vomiting, or diarrhea, constipation hematemesis, heart burn, dysphagia,change in bowel movements or stool caliber, hematochezia, melena, abdominal pain. Colonoscopy: Yes  GENITOURINARY:  Neg  Urinary frequency, hesitancy, urgency, polyuria, dysuria, hematuria, nocturia, incontinence, change in stream, genital pain or swelling, kidney stones  HEMATOLOGIC/LYMPHATIC:  Neg  Anemia, bleeding dyscrasias, easy bruising. .  + History of recurrent DVT and superficial thrombophlebitis  MUSCULOSKELETAL:  As per HPI  NEUROLOGICAL:  Neg  Loss of Consciousness, memeory loss or forgetfulness, confusion, difficulty concentrating, seizures, insomina, aphasia or dysarthria, unilateral weakness or paresthesias, ataxia, headaches, syncope, tremor, or H/O head trauma. PSYCHIATRIC:  Neg  Depression, anxiety  SKIN :  Neg  Rash, nail changes, sun burns, tattoos, change in moles, or skin color changes  ENDOCRINE:  Neg  Polydipsia,polyuria,abnormal weight changes,heat /cold intolerance, Hair changes. No H/O Diabetes or Thyroid disease. Physical Exam    Vitals:    09/14/21 1703   BP: 104/60   Pulse: 96   Weight: 268 lb (121.6 kg)     Body mass index is 44.6 kg/m². Wt Readings from Last 3 Encounters:   09/14/21 268 lb (121.6 kg)   05/10/21 277 lb (125.6 kg)   04/28/21 260 lb (117.9 kg)     BP Readings from Last 3 Encounters:   09/14/21 104/60   05/10/21 138/82   05/18/20 115/85      GEN:  59 y.o. female who is in NAD, A&O. She appears stated age and well nourished. She is cooperative and pleasant. Morbidly obese  HEAD:  NC/AT, no lesions.   EYES: NATAN, EOMI, No scleral icterus or conjunctival injection or discharge. Visual fields in tact to confrontation. EARS: Bilateral EACs obstructed with cerumen  NECK:  Supple. Full ROM. Trachea is midline. No increased JVD. No thyromegaly or nodules. No masses  LYMPH: No C/SC/A/F nodes  CARDIAC:  S1S2 NL. Regular rhythm. No murmur/clicks/rubs. No ectopy. PMI is non-displaced. VASC:  Pedal pulses 2/4. Carotid upstrokes 2+. No bruits noted. PULM:  Lungs are CTA. Symmetric breath sounds noted. AP Diameter NL. GI:  Abdomen is soft and nontender. No distension. No organomegaly. No masses. No pulsatile masses. EXT: + Bilateral lower extremity trace edema. Venous stasis hyperpigmentation noted bilaterally. SKIN: Warm and dry, normal turgor, no rash or lesions of concern. NEURO:  Cranial nerves 2-12 are NL. Speech fluent and coherent. Strength is 5/5 in all muscle groups. No sensory deficits. No focal or lateralizing deficits. Reflexes 2/4 and symmetric. Gait is normal.  MS: Left knee with crepitus with range of motion. Bony hypertrophy noted. Antalgic gait. PSYCH:  Mood and affect NL. Judgement and insight NL. Encounter Diagnoses   Name Primary?  Pre-operative examination for internal medicine Yes    Primary osteoarthritis of left knee     Type 2 diabetes mellitus without complication, without long-term current use of insulin (HCC)     Benign essential HTN     Gastroesophageal reflux disease without esophagitis     Venous insufficiency of both lower extremities     Stage 3a chronic kidney disease (HCC)     History of COVID-19     Acquired hypothyroidism     Impacted cerumen, bilateral        Plan:  Acceptable risk for the planned procedure. No contraindications at this time    EKG-- See report  Lab drawn    Anticoagulant management per hematology. Patient to stop Xarelto at least 48 hours prior to surgery. Restart Xarelto immediately after surgery.   Also recommend early mobilization as well as sequential compression pumps perioperatively and at home if possible    Pt will avoid NSAID's, OTC vitamin supplements and fish oil 1 week prior to procedure            Electronically Signed:  Janett Edwards D.O      Copy of H&P given to pt and sent to [facility]

## 2024-03-25 NOTE — PROGRESS NOTES
Progress Note - Behavioral Health     Alberto Berumen 28 y.o. male MRN: [id number]   Unit/Bed#: [facility] 383-02 Encounter: [id number]    Documentation, nursing notes, medication reconciliation, labs, and vitals reviewed. Patient was seen for continuing care and reviewed with treatment team. No acute events over the past 24 hours.  Per nursing report, reported ongoing auditory hallucinations over the weekend, but reported they were less and he is not as focused on them.    No medication changes over the past 24 hours. Continues to tolerate current medications with no adverse effects.  Continued ECT today.      On evaluation today, he is seen in his room and resting in bed.  Complains of increased tiredness on days of ECT.  Less verbal on days of ECT.  Continues to report desire to continue ECT, maintenance.  Feels it was helping and fearful that voices will be worse if stopping ECT.  No suicidal ideation, plan, or intent.  Ongoing perceptual disturbances and does not exhibit any symptoms of aimee on evaluation.    Psychiatric ROS:  Behavior over the last 24 hours: unchanged  Sleep: hypersomnia  Appetite: fair  Medication side effects: No   ROS: no complaints, all other systems are negative    Mental Status Evaluation:    Appearance:  marginal hygiene   Behavior:  pleasant, cooperative   Speech:  scant   Mood:  depressed, dysphoric   Affect:  flat   Thought Process:  circumstantial   Associations: concrete associations   Thought Content:  persecutory delusions   Perceptual Disturbances: auditory hallucinations still present, but less frequent   Risk Potential: Suicidal ideation - None  Homicidal ideation - None  Potential for aggression - No   Sensorium:  oriented to person, place, and time/date   Memory:  recent and remote memory grossly intact   Consciousness:  alert and awake   Attention/Concentration: decreased concentration and decreased attention span   Insight:  limited   Judgment: limited   Gait/Station: normal  gait/station, normal balance   Motor Activity: no abnormal movements     Vital signs in last 24 hours:    Temp:  [97.5 °F (36.4 °C)-98.6 °F (37 °C)] 97.5 °F (36.4 °C)  HR:  [] 70  Resp:  [14-18] 16  BP: (113-179)/() 139/101    Laboratory results: I have personally reviewed all pertinent laboratory/tests results    Results from the past 24 hours:   Recent Results (from the past 24 hour(s))   CBC and differential    Collection Time: 03/25/24  5:41 AM   Result Value Ref Range    WBC 9.81 4.31 - 10.16 Thousand/uL    RBC 5.17 3.88 - 5.62 Million/uL    Hemoglobin 12.4 12.0 - 17.0 g/dL    Hematocrit 40.8 36.5 - 49.3 %    MCV 79 (L) 82 - 98 fL    MCH 24.0 (L) 26.8 - 34.3 pg    MCHC 30.4 (L) 31.4 - 37.4 g/dL    RDW 13.0 11.6 - 15.1 %    MPV 11.1 8.9 - 12.7 fL    Platelets 231 149 - 390 Thousands/uL    nRBC 0 /100 WBCs    Neutrophils Relative 49 43 - 75 %    Immature Grans % 1 0 - 2 %    Lymphocytes Relative 37 14 - 44 %    Monocytes Relative 9 4 - 12 %    Eosinophils Relative 3 0 - 6 %    Basophils Relative 1 0 - 1 %    Neutrophils Absolute 4.89 1.85 - 7.62 Thousands/µL    Absolute Immature Grans 0.05 0.00 - 0.20 Thousand/uL    Absolute Lymphocytes 3.60 0.60 - 4.47 Thousands/µL    Absolute Monocytes 0.92 0.17 - 1.22 Thousand/µL    Eosinophils Absolute 0.29 0.00 - 0.61 Thousand/µL    Basophils Absolute 0.06 0.00 - 0.10 Thousands/µL   Comprehensive metabolic panel    Collection Time: 03/25/24  5:41 AM   Result Value Ref Range    Sodium 141 135 - 147 mmol/L    Potassium 4.1 3.5 - 5.3 mmol/L    Chloride 104 96 - 108 mmol/L    CO2 28 21 - 32 mmol/L    ANION GAP 9 4 - 13 mmol/L    BUN 16 5 - 25 mg/dL    Creatinine 0.97 0.60 - 1.30 mg/dL    Glucose 84 65 - 140 mg/dL    Calcium 9.2 8.4 - 10.2 mg/dL    AST 21 13 - 39 U/L    ALT 44 7 - 52 U/L    Alkaline Phosphatase 80 34 - 104 U/L    Total Protein 6.8 6.4 - 8.4 g/dL    Albumin 3.8 3.5 - 5.0 g/dL    Total Bilirubin 0.30 0.20 - 1.00 mg/dL    eGFR 106 ml/min/1.73sq m        Suicide/Homicide Risk Assessment:    Risk of Harm to Self:   Current Specific Risk Factors include: current depressive symptoms, current psychotic symptoms  Protective Factors: no current suicidal ideation, ability to communicate with staff on the unit, able to contract for safety on the unit, taking medications as ordered on the unit  Based on today's assessment, Alberto presents the following risk of harm to self: minimal    Risk of Harm to Others:  Current Specific Risk Factors include: current psychotic symptoms  Protective Factors: no current homicidal ideation, psychotic symptoms are less prominent, compliant with medications on the unit as ordered  Based on today's assessment, Alberto presents the following risk of harm to others: minimal    The following interventions are recommended: behavioral checks every 7 minutes, continued hospitalization on locked unit    Progress Toward Goals: progressing    Assessment/Plan   Principal Problem:    Schizoaffective disorder, bipolar type (HCC)  Active Problems:    GERD (gastroesophageal reflux disease)    Tobacco abuse    T wave inversion in EKG    Chronic idiopathic constipation    Vitamin B 12 deficiency    Vitamin D deficiency    Acanthosis nigricans    Rash    Class 2 obesity in adult      Recommended Treatment:     Planned medication and treatment changes:    All current active medications have been reviewed  Encourage group therapy, milieu therapy and occupational therapy  Behavioral Health checks every 7 minutes  Extended Acute Care referral  Monitor weekly CBC for Clozaril  Possible maintenance ECT, schedule to be determined  Continue current medications:    Current Facility-Administered Medications   Medication Dose Route Frequency Provider Last Rate    acetaminophen  650 mg Oral Q6H PRN Yasmin Harvey MD      acetaminophen  650 mg Oral Q4H PRN Yasmin Harvey MD      acetaminophen  975 mg Oral Q6H PRN Yasmin Harvey MD      aluminum-magnesium  hydroxide-simethicone  30 mL Oral Q4H PRN Yasmin Harvey MD      atropine  1 drop Sublingual HS HOLLI Anderson      atropine  1 drop Sublingual Daily PRN HOLLI Anderson      haloperidol lactate  2.5 mg Intramuscular Q4H PRN Max 4/day Yasmin Harvey MD      And    LORazepam  1 mg Intramuscular Q4H PRN Max 4/day Yasmin Harvey MD      And    benztropine  0.5 mg Intramuscular Q4H PRN Max 4/day Yasmin Harvey MD      haloperidol lactate  5 mg Intramuscular Q4H PRN Max 4/day Yasmin Harvey MD      And    LORazepam  2 mg Intramuscular Q4H PRN Max 4/day Yasmin Harvey MD      And    benztropine  1 mg Intramuscular Q4H PRN Max 4/day Yasmin Harvey MD      benztropine  1 mg Oral Q4H PRN Max 6/day Yasmin Harvey MD      bisacodyl  10 mg Rectal Daily PRN Yasmin Harvey MD      cloZAPine  450 mg Oral HS HOLLI Anderson      hydrOXYzine HCL  50 mg Oral Q6H PRN Max 4/day Yasmin Harvey MD      Or    diphenhydrAMINE  50 mg Intramuscular Q6H PRN Yasmin Harvey MD      diphenhydrAMINE-zinc acetate   Topical BID PRN Anjel Sagastume MD      escitalopram  20 mg Oral Daily La Flores MD      haloperidol  1 mg Oral Q6H PRN Yasmin Harvey MD      haloperidol  2.5 mg Oral Q4H PRN Max 4/day Yasmin Harvey MD      haloperidol  5 mg Oral Q4H PRN Max 4/day Yasmin Harvey MD      hydrALAZINE  5 mg Intravenous Q10 Min PRN Khoa Gutierrez CRNA      hydrocortisone   Topical 4x Daily PRN HOLLI Monge      hydrOXYzine HCL  100 mg Oral Q6H PRN Max 4/day Yasmin Harvey MD      Or    LORazepam  2 mg Intramuscular Q6H PRN Yasmin Harvey MD      hydrOXYzine HCL  25 mg Oral Q6H PRN Max 4/day Yasmin Harvey MD      ibuprofen  600 mg Oral Q8H PRN HOLLI Monge      lactated ringers  100 mL/hr Intravenous Continuous Elisa Workman  mL/hr (03/15/24 0700)    lactated ringers  100 mL/hr Intravenous Continuous MD river Ariasd  ringers  125 mL/hr Intravenous Continuous Vikas Madison MD Stopped (03/15/24 0756)    lactated ringers  100 mL/hr Intravenous Continuous Anjel Arriaza CRNA      lactated ringers  100 mL/hr Intravenous Continuous Elisa Workman  mL/hr (03/13/24 0610)    lactated ringers  125 mL/hr Intravenous Continuous Anthony Fontana MD Stopped (03/15/24 0756)    lidocaine (PF)  0.5 mL Infiltration Once PRN Anthony Fontana MD      melatonin  3 mg Oral HS Yasmin Harvey MD      metFORMIN  500 mg Oral Daily With Breakfast Eileen HOLLI Higuera      methocarbamol  500 mg Oral Q6H PRN HOLLI Monge      minocycline  100 mg Oral Q12H KAYLIE HOLLI Monge      nicotine polacrilex  4 mg Oral Q2H PRN Yasmin Harvey MD      ondansetron  4 mg Oral Q6H PRN HOLLI Galvan      polyethylene glycol  17 g Oral Daily PRN Yasmin Harvey MD      polyethylene glycol  17 g Oral Daily Eileenaddie Jensen, HOLLI      propranolol  10 mg Oral Q12H [facility] HOLLI Anderson      senna-docusate sodium  1 tablet Oral Daily PRN Yasmin Harvey MD      senna-docusate sodium  2 tablet Oral BID La Flores MD      traZODone  50 mg Oral HS PRN Yasmin Harvey MD      white petrolatum-mineral oil   Topical TID PRN Anjel Sagastume MD       Risks / Benefits of Treatment:    Risks, benefits, and possible side effects of medications explained to patient and patient verbalizes understanding and agreement for treatment.  Discussed risks and benefits of Electroconvulsive Treatment with patient including risks of anesthesia and memory loss. Alberto verbalized understanding and agreed for ECT.    Counseling / Coordination of Care:    Patient's progress discussed with staff in treatment team meeting.  Medications, treatment progress and treatment plan reviewed with patient.    HOLLI Anderson 03/25/24

## 2024-03-25 NOTE — PROGRESS NOTES
03/25/24 0842   Team Meeting   Meeting Type Daily Rounds   Team Members Present   Team Members Present Physician;Nurse;   Physician Team Member Mark   Nursing Team Member [facility] Management Team Member Marco   Patient/Family Present   Patient Present No   Patient's Family Present No     ECT to continue with maintenance. Pt continues to report AH, but decreased depression. Med/meal compliant. DC tbd.

## 2024-03-25 NOTE — PLAN OF CARE
Problem: PSYCHOSIS  Goal: Will report no hallucinations or delusions  Description: Interventions:  - Administer medication as  ordered  - Every waking shifts and PRN assess for the presence of hallucinations and or delusions  - Assist with reality testing to support increasing orientation  - Assess if patient's hallucinations or delusions are encouraging self-harm or harm to others and intervene as appropriate  Outcome: Progressing     Problem: ANXIETY  Goal: Will report anxiety at manageable levels  Description: INTERVENTIONS:  - Administer medication as ordered  - Teach and encourage coping skills  - Provide emotional support  - Assess patient/family for anxiety and ability to cope  Outcome: Progressing     Problem: Depression  Goal: Treatment Goal: Demonstrate behavioral control of depressive symptoms, verbalize feelings of improved mood/affect, and adopt new coping skills prior to discharge  Outcome: Progressing  Goal: Verbalize thoughts and feelings  Description: Interventions:  - Assess and re-assess patient's level of risk   - Engage patient in 1:1 interactions, daily, for a minimum of 15 minutes   - Encourage patient to express feelings, fears, frustrations, hopes   Outcome: Progressing  Goal: Refrain from isolation  Description: Interventions:  - Develop a trusting relationship   - Encourage socialization   Outcome: Progressing  Goal: Refrain from self-neglect  Outcome: Progressing     Problem: Electroconvulsive therapy (ECT)  Goal: Treatment Goal: Demonstrate a reduction of confusion and improved orientation to person, place, time and/or situation upon discharge, according to optimum baseline/ability  Outcome: Progressing  Goal: Verbalizes/displays adequate comfort level or baseline comfort level  Description: Interventions:  - Encourage patient to monitor pain and request assistance  - Assess pain using appropriate pain scale  - Administer analgesics based on type and severity of pain and evaluate  response  - Implement non-pharmacological measures as appropriate and evaluate response  - Consider cultural and social influences on pain and pain management  - Notify physician/advanced practitioner if interventions unsuccessful or patient reports new pain  Outcome: Progressing  Goal: Achieves stable or improved neurological status  Description: INTERVENTIONS  - Monitor and report changes in neurological status  - Monitor vital signs such as temperature, blood pressure, glucose, and any other labs ordered   - Initiate measures to prevent increased intracranial pressure  - Monitor for seizure activity and implement precautions if appropriate      Outcome: Progressing  Goal: Achieves maximal functionality and self care  Description: INTERVENTIONS  - Monitor swallowing and airway patency with patient fatigue and changes in neurological status  - Encourage and assist patient to increase activity and self care.   - Encourage visually impaired, hearing impaired and aphasic patients to use assistive/communication devices  Outcome: Progressing  Goal: Maintain or return mobility to safest level of function  Description: INTERVENTIONS:  - Assess patient's ability to carry out ADLs; assess patient's baseline for ADL function and identify physical deficits which impact ability to perform ADLs (bathing, care of mouth/teeth, toileting, grooming, dressing, etc.)  - Assess/evaluate cause of self-care deficits   - Assess range of motion  - Assess patient's mobility  - Assess patient's need for assistive devices and provide as appropriate  - Encourage maximum independence but intervene and supervise when necessary  - Involve family in performance of ADLs  - Assess for home care needs following discharge   - Consider OT consult to assist with ADL evaluation and planning for discharge  - Provide patient education as appropriate  Outcome: Progressing  Goal: Absence of urinary retention  Description: INTERVENTIONS:  - Assess patient’s  ability to void and empty bladder  - Monitor I/O  - Bladder scan as needed  - Discuss with physician/AP medications to alleviate retention as needed  - Discuss catheterization for long term situations as appropriate  Outcome: Progressing  Goal: Minimal or absence of nausea and/or vomiting  Description: INTERVENTIONS:  - Administer IV fluids if ordered to ensure adequate hydration  - Maintain NPO status until nausea and vomiting are resolved  - Nasogastric tube if ordered  - Administer ordered antiemetic medications as needed  - Provide nonpharmacologic comfort measures as appropriate  - Advance diet as tolerated, if ordered  - Consider nutrition services referral to assist patient with adequate nutrition and appropriate food choices  Outcome: Progressing  Goal: Maintains adequate nutritional intake  Description: INTERVENTIONS:  - Monitor percentage of each meal consumed  - Identify factors contributing to decreased intake, treat as appropriate  - Assist with meals as needed  - Monitor I&O, weight, and lab values if indicated  - Obtain nutrition services referral as needed  Outcome: Progressing

## 2024-03-25 NOTE — ANESTHESIA POSTPROCEDURE EVALUATION
Post-Op Assessment Note    CV Status:  Stable  Pain Score: 0    Pain management: adequate       Mental Status:  Alert   Hydration Status:  Stable   PONV Controlled:  Controlled   Airway Patency:  Patent     Post Op Vitals Reviewed: Yes    No anethesia notable event occurred.    Staff: CRNA               BP      Temp      Pulse     Resp      SpO2

## 2024-03-26 PROCEDURE — 99232 SBSQ HOSP IP/OBS MODERATE 35: CPT | Performed by: PSYCHIATRY & NEUROLOGY

## 2024-03-26 RX ADMIN — PROPRANOLOL HYDROCHLORIDE 10 MG: 10 TABLET ORAL at 08:10

## 2024-03-26 RX ADMIN — MINOCYCLINE HYDROCHLORIDE 100 MG: 100 CAPSULE ORAL at 08:10

## 2024-03-26 RX ADMIN — ATROPINE SULFATE 1 DROP: 10 SOLUTION/ DROPS OPHTHALMIC at 21:32

## 2024-03-26 RX ADMIN — SENNOSIDES AND DOCUSATE SODIUM 2 TABLET: 8.6; 5 TABLET ORAL at 08:10

## 2024-03-26 RX ADMIN — METFORMIN HYDROCHLORIDE 500 MG: 500 TABLET ORAL at 08:10

## 2024-03-26 RX ADMIN — MINOCYCLINE HYDROCHLORIDE 100 MG: 100 CAPSULE ORAL at 21:32

## 2024-03-26 RX ADMIN — SENNOSIDES AND DOCUSATE SODIUM 2 TABLET: 8.6; 5 TABLET ORAL at 17:32

## 2024-03-26 RX ADMIN — NICOTINE POLACRILEX 4 MG: 4 GUM, CHEWING ORAL at 19:27

## 2024-03-26 RX ADMIN — ESCITALOPRAM OXALATE 20 MG: 10 TABLET ORAL at 08:10

## 2024-03-26 RX ADMIN — CLOZAPINE 450 MG: 25 TABLET ORAL at 21:31

## 2024-03-26 RX ADMIN — MELATONIN TAB 3 MG 3 MG: 3 TAB at 21:31

## 2024-03-26 RX ADMIN — NICOTINE POLACRILEX 4 MG: 4 GUM, CHEWING ORAL at 21:32

## 2024-03-26 RX ADMIN — PROPRANOLOL HYDROCHLORIDE 10 MG: 10 TABLET ORAL at 21:31

## 2024-03-26 RX ADMIN — POLYETHYLENE GLYCOL 3350 17 G: 17 POWDER, FOR SOLUTION ORAL at 08:09

## 2024-03-26 RX ADMIN — NICOTINE POLACRILEX 4 MG: 4 GUM, CHEWING ORAL at 13:01

## 2024-03-26 NOTE — NURSING NOTE
Pt verbally denies SI and HI. Compliant with meds, observed in bedroom resting at the beginning of breakfast. Dressed in personal attire by choice, socializing effectively with others. Continues to report complaints of continued mild depression and AH of negative voices about family. Is otherwise cooperative.

## 2024-03-26 NOTE — PROGRESS NOTES
Progress Note - Behavioral Health     Alberto Berumen 28 y.o. male MRN: [id number]   Unit/Bed#: [facility] 383-02 Encounter: [id number]    Documentation, nursing notes, medication reconciliation, labs, and vitals reviewed. Patient was seen for continuing care and reviewed with treatment team. No acute events over the past 24 hours.  Per nursing report, continues to report mild depression and ongoing auditory hallucinations, negative voices about family.  Otherwise cooperative.    Medication changes over the past 24 hours. Continues to tolerate current medications with no adverse effects.  Completed 12th ECT treatments with some improvements and wishes to continue maintenance ECT.    On evaluation today, he is in his room and napping after breakfast.  He is agreeable to maintenance ECT and aware we will be weekly for the first 2 weeks starting on Monday.  Continues to report some improvements in depression and decrease in intensity of hallucinations, but continues with hallucinations and paranoia that makes him fearful to return home.    No suicidal ideation, plan, or intent.  Ongoing perceptual disturbances and does not exhibit any symptoms of aimee on evaluation.    Psychiatric ROS:  Behavior over the last 24 hours: minimal improvement  Sleep: slept off and on  Appetite: fair  Medication side effects: No   ROS: no complaints, all other systems are negative    Mental Status Evaluation:    Appearance:  marginal hygiene   Behavior:  guarded   Speech:  scant   Mood:  depressed   Affect:  constricted   Thought Process:  decreased rate of thoughts   Associations: concrete associations   Thought Content:  persecutory delusions   Perceptual Disturbances: auditory hallucinations   Risk Potential: Suicidal ideation - None  Homicidal ideation - None  Potential for aggression - No   Sensorium:  oriented to person, place, and time/date   Memory:  recent and remote memory grossly intact   Consciousness:  alert and awake    Attention/Concentration: decreased concentration and decreased attention span   Insight:  limited   Judgment: limited   Gait/Station: normal gait/station, normal balance   Motor Activity: no abnormal movements     Vital signs in last 24 hours:    Temp:  [97.5 °F (36.4 °C)-98.8 °F (37.1 °C)] 97.5 °F (36.4 °C)  HR:  [] 76  Resp:  [16] 16  BP: (132-148)/() 141/88    Laboratory results: I have personally reviewed all pertinent laboratory/tests results    Results from the past 24 hours: No results found for this or any previous visit (from the past 24 hour(s)).    Suicide/Homicide Risk Assessment:    Risk of Harm to Self:   Current Specific Risk Factors include: current depressive symptoms, current psychotic symptoms  Protective Factors: no current suicidal ideation, ability to communicate with staff on the unit, able to contract for safety on the unit, taking medications as ordered on the unit  Based on today's assessment, Alberto presents the following risk of harm to self: minimal    Risk of Harm to Others:  Current Specific Risk Factors include: current psychotic symptoms  Protective Factors: no current homicidal ideation, psychotic symptoms are less prominent, compliant with medications on the unit as ordered  Based on today's assessment, Alberto presents the following risk of harm to others: minimal    The following interventions are recommended: behavioral checks every 7 minutes, continued hospitalization on locked unit    Progress Toward Goals: minimal improvement, depression is improving, poor motivation, still has psychotic symptoms    Assessment/Plan   Principal Problem:    Schizoaffective disorder, bipolar type (HCC)  Active Problems:    GERD (gastroesophageal reflux disease)    Tobacco abuse    T wave inversion in EKG    Chronic idiopathic constipation    Vitamin B 12 deficiency    Vitamin D deficiency    Acanthosis nigricans    Rash    Class 2 obesity in adult      Recommended Treatment:      Planned medication and treatment changes:    All current active medications have been reviewed  Encourage group therapy, milieu therapy and occupational therapy  Behavioral Health checks every 7 minutes  Continue ECT maintenance ECT to start Monday and weekly x 2 to start  [facility] referral  Monitor weekly CBC for Clozaril  Continue current medications:    Current Facility-Administered Medications   Medication Dose Route Frequency Provider Last Rate    acetaminophen  650 mg Oral Q6H PRN Yasmin Harvey MD      acetaminophen  650 mg Oral Q4H PRN Yasmin Harvey MD      acetaminophen  975 mg Oral Q6H PRN Yasmin Harvey MD      aluminum-magnesium hydroxide-simethicone  30 mL Oral Q4H PRN Yasmin Harvey MD      atropine  1 drop Sublingual HS HOLLI Anderson      atropine  1 drop Sublingual Daily PRN HOLLI Anderson      haloperidol lactate  2.5 mg Intramuscular Q4H PRN Max 4/day Yasmin Harvey MD      And    LORazepam  1 mg Intramuscular Q4H PRN Max 4/day Yasmin Harvey MD      And    benztropine  0.5 mg Intramuscular Q4H PRN Max 4/day Yasmin Harvey MD      haloperidol lactate  5 mg Intramuscular Q4H PRN Max 4/day Yasmin Harvey MD      And    LORazepam  2 mg Intramuscular Q4H PRN Max 4/day Yasmin Harvey MD      And    benztropine  1 mg Intramuscular Q4H PRN Max 4/day Yasmin Harvey MD      benztropine  1 mg Oral Q4H PRN Max 6/day Yasmin Harvey MD      bisacodyl  10 mg Rectal Daily PRN Yasmin Harvey MD      cloZAPine  450 mg Oral HS HOLLI Anderson      hydrOXYzine HCL  50 mg Oral Q6H PRN Max 4/day Yasmin Harvey MD      Or    diphenhydrAMINE  50 mg Intramuscular Q6H PRN Yasmin Harvey MD      diphenhydrAMINE-zinc acetate   Topical BID PRN Anjel Sagastume MD      escitalopram  20 mg Oral Daily La Flores MD      haloperidol  1 mg Oral Q6H PRN Yasmin Harvey MD      haloperidol  2.5 mg Oral Q4H PRN Max 4/day Yasmin Harvey MD      haloperidol  5 mg Oral Q4H  PRN Max 4/day Yasmin Harvey MD      hydrALAZINE  5 mg Intravenous Q10 Min PRN Khoa Gutierrez CRNA      hydrocortisone   Topical 4x Daily PRN HOLLI Monge      hydrOXYzine HCL  100 mg Oral Q6H PRN Max 4/day Yasmin Harvey MD      Or    LORazepam  2 mg Intramuscular Q6H PRN Yasmin Harvey MD      hydrOXYzine HCL  25 mg Oral Q6H PRN Max 4/day Yasmin Harvey MD      ibuprofen  600 mg Oral Q8H PRN HOLLI Monge      lactated ringers  100 mL/hr Intravenous Continuous Elisa Workman  mL/hr (03/15/24 0700)    lactated ringers  100 mL/hr Intravenous Continuous Elisa Workman MD      lactated ringers  125 mL/hr Intravenous Continuous Vikas Madison MD Stopped (03/15/24 0756)    lactated ringers  100 mL/hr Intravenous Continuous Anjel Arriaza CRNA      lactated ringers  100 mL/hr Intravenous Continuous Elisa Workman  mL/hr (03/13/24 0610)    lactated ringers  125 mL/hr Intravenous Continuous Anthony Fontana MD Stopped (03/15/24 0756)    lidocaine (PF)  0.5 mL Infiltration Once PRN Anthony Fontana MD      melatonin  3 mg Oral HS Yasmin Harvey MD      metFORMIN  500 mg Oral Daily With Breakfast HOLLI Monge      methocarbamol  500 mg Oral Q6H PRN HOLLI Monge      minocycline  100 mg Oral Q12H [facility] HOLLI Monge      nicotine polacrilex  4 mg Oral Q2H PRN Yasmin Harvey MD      ondansetron  4 mg Oral Q6H PRN HOLLI Galvan      polyethylene glycol  17 g Oral Daily PRN Yasmin Harvey MD      polyethylene glycol  17 g Oral Daily HOLLI Monge      propranolol  10 mg Oral Q12H [facility] HOLLI Anderson      senna-docusate sodium  1 tablet Oral Daily PRN Yasmin Harvey MD      senna-docusate sodium  2 tablet Oral BID La Flores MD      traZODone  50 mg Oral HS PRN Yasmin Harvey MD      white petrolatum-mineral oil   Topical TID PRN Anjel Sagastume MD       Risks /  Benefits of Treatment:    Risks, benefits, and possible side effects of medications explained to patient and patient verbalizes understanding and agreement for treatment.  Discussed risks and benefits of Electroconvulsive Treatment with patient including risks of anesthesia and memory loss. Alberto verbalized understanding and agreed for ECT.    Counseling / Coordination of Care:    Patient's progress discussed with staff in treatment team meeting.  Medications, treatment progress and treatment plan reviewed with patient.    HOLLI Anderson 03/26/24

## 2024-03-26 NOTE — PROGRESS NOTES
03/26/24 0904   Team Meeting   Meeting Type Daily Rounds   Team Members Present   Team Members Present Physician;Nurse;   Physician Team Member Mark   Nursing Team Member Earl   Care Management Team Member Marco   Patient/Family Present   Patient Present No   Patient's Family Present No     Reports AH of negative voices and some depression. Pt reports feeling afraid of leaving the hospiatl due to paranoia of the voices. Pt to continue on maintenance ECT. Med/meal compliant. EAC referral to be pursued. DC tbd.

## 2024-03-26 NOTE — PLAN OF CARE
Problem: ANXIETY  Goal: Will report anxiety at manageable levels  Description: INTERVENTIONS:  - Administer medication as ordered  - Teach and encourage coping skills  - Provide emotional support  - Assess patient/family for anxiety and ability to cope  Outcome: Progressing     Problem: Depression  Goal: Treatment Goal: Demonstrate behavioral control of depressive symptoms, verbalize feelings of improved mood/affect, and adopt new coping skills prior to discharge  Outcome: Progressing  Goal: Verbalize thoughts and feelings  Description: Interventions:  - Assess and re-assess patient's level of risk   - Engage patient in 1:1 interactions, daily, for a minimum of 15 minutes   - Encourage patient to express feelings, fears, frustrations, hopes   Outcome: Progressing  Goal: Refrain from isolation  Description: Interventions:  - Develop a trusting relationship   - Encourage socialization   Outcome: Progressing  Goal: Refrain from self-neglect  Outcome: Progressing     Problem: Electroconvulsive therapy (ECT)  Goal: Treatment Goal: Demonstrate a reduction of confusion and improved orientation to person, place, time and/or situation upon discharge, according to optimum baseline/ability  Outcome: Progressing  Goal: Verbalizes/displays adequate comfort level or baseline comfort level  Description: Interventions:  - Encourage patient to monitor pain and request assistance  - Assess pain using appropriate pain scale  - Administer analgesics based on type and severity of pain and evaluate response  - Implement non-pharmacological measures as appropriate and evaluate response  - Consider cultural and social influences on pain and pain management  - Notify physician/advanced practitioner if interventions unsuccessful or patient reports new pain  Outcome: Progressing  Goal: Achieves stable or improved neurological status  Description: INTERVENTIONS  - Monitor and report changes in neurological status  - Monitor vital signs such  as temperature, blood pressure, glucose, and any other labs ordered   - Initiate measures to prevent increased intracranial pressure  - Monitor for seizure activity and implement precautions if appropriate      Outcome: Progressing  Goal: Achieves maximal functionality and self care  Description: INTERVENTIONS  - Monitor swallowing and airway patency with patient fatigue and changes in neurological status  - Encourage and assist patient to increase activity and self care.   - Encourage visually impaired, hearing impaired and aphasic patients to use assistive/communication devices  Outcome: Progressing  Goal: Maintain or return mobility to safest level of function  Description: INTERVENTIONS:  - Assess patient's ability to carry out ADLs; assess patient's baseline for ADL function and identify physical deficits which impact ability to perform ADLs (bathing, care of mouth/teeth, toileting, grooming, dressing, etc.)  - Assess/evaluate cause of self-care deficits   - Assess range of motion  - Assess patient's mobility  - Assess patient's need for assistive devices and provide as appropriate  - Encourage maximum independence but intervene and supervise when necessary  - Involve family in performance of ADLs  - Assess for home care needs following discharge   - Consider OT consult to assist with ADL evaluation and planning for discharge  - Provide patient education as appropriate  Outcome: Progressing  Goal: Absence of urinary retention  Description: INTERVENTIONS:  - Assess patient’s ability to void and empty bladder  - Monitor I/O  - Bladder scan as needed  - Discuss with physician/AP medications to alleviate retention as needed  - Discuss catheterization for long term situations as appropriate  Outcome: Progressing  Goal: Minimal or absence of nausea and/or vomiting  Description: INTERVENTIONS:  - Administer IV fluids if ordered to ensure adequate hydration  - Maintain NPO status until nausea and vomiting are  resolved  - Nasogastric tube if ordered  - Administer ordered antiemetic medications as needed  - Provide nonpharmacologic comfort measures as appropriate  - Advance diet as tolerated, if ordered  - Consider nutrition services referral to assist patient with adequate nutrition and appropriate food choices  Outcome: Progressing  Goal: Maintains adequate nutritional intake  Description: INTERVENTIONS:  - Monitor percentage of each meal consumed  - Identify factors contributing to decreased intake, treat as appropriate  - Assist with meals as needed  - Monitor I&O, weight, and lab values if indicated  - Obtain nutrition services referral as needed  Outcome: Progressing     Problem: PSYCHOSIS  Goal: Will report no hallucinations or delusions  Description: Interventions:  - Administer medication as  ordered  - Every waking shifts and PRN assess for the presence of hallucinations and or delusions  - Assist with reality testing to support increasing orientation  - Assess if patient's hallucinations or delusions are encouraging self-harm or harm to others and intervene as appropriate  Outcome: Not Progressing     Problem: Ineffective Coping  Goal: Participates in unit activities  Description: Interventions:  - Provide therapeutic environment   - Provide required programming   - Redirect inappropriate behaviors   Outcome: Not Progressing

## 2024-03-26 NOTE — NURSING NOTE
"Alberto is calm with a blunted affect reporting \"a little bit of depression\" saying he is \"scared\" to leave the hospital due to ongoing paranoia from voices. He has AH telling him he and his family are going to die and are not command in nature. Explained to him the importance of therapy to help with the paranoia and fear when outpatient. He says he looks forward to resuming therapy. Denies SI/HI/VH or anxiety. He is visible on the milieu socializing with peers. Nicotine gum given for cravings and was effective. No unmet needs at this time.   "

## 2024-03-26 NOTE — NURSING NOTE
"Alberto is calm with a depressed affect reporting \"mild depression\" and no anxiety or SI/HI/VH. He reports ongoing AH of voices telling him he and his family are going to idea. He is concerned about feeling paranoid outside the hospital. When asked about EAC, he says he feels uncertain and plans to speak with his aunt on the phone tonight to see what she thinks. He says he would like to be outside and is ambivalent about remaining in the hospital. Given nicotine gum upon request. He is visible on the milieu socializing with peers and particpating in groups. No unmet needs currently.   "

## 2024-03-27 PROCEDURE — 99232 SBSQ HOSP IP/OBS MODERATE 35: CPT | Performed by: PSYCHIATRY & NEUROLOGY

## 2024-03-27 RX ORDER — AMLODIPINE BESYLATE 5 MG/1
5 TABLET ORAL DAILY
Status: DISCONTINUED | OUTPATIENT
Start: 2024-03-27 | End: 2024-03-29 | Stop reason: HOSPADM

## 2024-03-27 RX ADMIN — NICOTINE POLACRILEX 4 MG: 4 GUM, CHEWING ORAL at 15:46

## 2024-03-27 RX ADMIN — MINOCYCLINE HYDROCHLORIDE 100 MG: 100 CAPSULE ORAL at 08:46

## 2024-03-27 RX ADMIN — MELATONIN TAB 3 MG 3 MG: 3 TAB at 21:17

## 2024-03-27 RX ADMIN — ATROPINE SULFATE 1 DROP: 10 SOLUTION/ DROPS OPHTHALMIC at 21:19

## 2024-03-27 RX ADMIN — CLOZAPINE 450 MG: 25 TABLET ORAL at 21:17

## 2024-03-27 RX ADMIN — PROPRANOLOL HYDROCHLORIDE 10 MG: 10 TABLET ORAL at 21:17

## 2024-03-27 RX ADMIN — METFORMIN HYDROCHLORIDE 500 MG: 500 TABLET ORAL at 07:55

## 2024-03-27 RX ADMIN — NICOTINE POLACRILEX 4 MG: 4 GUM, CHEWING ORAL at 19:11

## 2024-03-27 RX ADMIN — NICOTINE POLACRILEX 4 MG: 4 GUM, CHEWING ORAL at 09:27

## 2024-03-27 RX ADMIN — MINOCYCLINE HYDROCHLORIDE 100 MG: 100 CAPSULE ORAL at 21:17

## 2024-03-27 RX ADMIN — NICOTINE POLACRILEX 4 MG: 4 GUM, CHEWING ORAL at 21:19

## 2024-03-27 RX ADMIN — PROPRANOLOL HYDROCHLORIDE 10 MG: 10 TABLET ORAL at 08:46

## 2024-03-27 RX ADMIN — AMLODIPINE BESYLATE 5 MG: 5 TABLET ORAL at 09:27

## 2024-03-27 RX ADMIN — ESCITALOPRAM OXALATE 20 MG: 10 TABLET ORAL at 08:46

## 2024-03-27 RX ADMIN — SENNOSIDES AND DOCUSATE SODIUM 2 TABLET: 8.6; 5 TABLET ORAL at 08:46

## 2024-03-27 RX ADMIN — POLYETHYLENE GLYCOL 3350 17 G: 17 POWDER, FOR SOLUTION ORAL at 08:46

## 2024-03-27 RX ADMIN — NICOTINE POLACRILEX 4 MG: 4 GUM, CHEWING ORAL at 00:02

## 2024-03-27 RX ADMIN — SENNOSIDES AND DOCUSATE SODIUM 2 TABLET: 8.6; 5 TABLET ORAL at 17:22

## 2024-03-27 NOTE — PROGRESS NOTES
03/27/24 0902   Team Meeting   Meeting Type Daily Rounds   Team Members Present   Team Members Present Physician;Nurse;   Physician Team Member Mark   Nursing Team Member Earl   [facility] Management Team Member felisha   Patient/Family Present   Patient Present No   Patient's Family Present No     Pt reports AH of negative voices about family. Pt paranoid about AH outside of the hospital. Pt ambivalent about EAC. Pt to continue ECT. Med/meal compliant. Dc tbd.

## 2024-03-27 NOTE — QUICK NOTE
Discussed with patient about pursing further ECT treatments as he has had some benefit with AH. We discussed transitioning for 3 additional treatments 3/29, 4/1, and 4/3. We will then transition to weekly, biweekly, and then to monthly ECT. He expressed understanding and was in agreement to this plan. He signed updated consent form.

## 2024-03-27 NOTE — NURSING NOTE
Pt verbally denies SI and HI; reports ongoing auditory hallucinations verbalizing negative statements about his family; this stimulation has led to ongoing depression that is unchanged in severity over the last 48hrs. Observed in personal attire by choice, in bedroom resting on first approach; compliant with meds; free from agitation or behavioral instability. Utilizing coping skills and socialization within milieu when OOB.

## 2024-03-27 NOTE — PROGRESS NOTES
Progress Note - Behavioral Health     Alberto Berumen 28 y.o. male MRN: [id number]   Unit/Bed#: [facility] 383-02 Encounter: [id number]    Documentation, nursing notes, medication reconciliation, labs, and vitals reviewed. Patient was seen for continuing care and reviewed with treatment team. No acute events over the past 24 hours.  Per nursing report, continues to report auditory hallucinations threatening him and his family. Social in milieu .    No Medication changes over the past 24 hours. Continues to tolerate current medications with no adverse effects. Continues with ECT  for one more week, then to start maintenance ECT after that.    On evaluation today, He is seen up and in the dining room.  Noted brighter and more awake.  States he does wish to continue.    No suicidal ideation, plan, or intent. Ongoing perceptual disturbances and does not exhibit any symptoms of aimee on evaluation.    Psychiatric ROS:  Behavior over the last 24 hours: unchanged  Sleep: slept off and on  Appetite: fair  Medication side effects: No   ROS: no complaints, all other systems are negative    Mental Status Evaluation:    Appearance:  marginal hygiene   Behavior:  cooperative, guarded   Speech:  slow   Mood:  depressed   Affect:  flat   Thought Process:  decreased rate of thoughts   Associations: concrete associations   Thought Content:  persecutory delusions   Perceptual Disturbances: auditory hallucinations still present, but less frequent   Risk Potential: Suicidal ideation - None  Homicidal ideation - None  Potential for aggression - No   Sensorium:  oriented to person, place, and time/date   Memory:  recent and remote memory grossly intact   Consciousness:  alert and awake   Attention/Concentration: decreased concentration and decreased attention span   Insight:  limited   Judgment: limited   Gait/Station: normal gait/station, normal balance   Motor Activity: no abnormal movements     Vital signs in last 24 hours:    Temp:  [98.8  °F (37.1 °C)] 98.8 °F (37.1 °C)  HR:  [102-107] 102  Resp:  [16] 16  BP: (144-150)/(84-90) 144/90    Laboratory results: I have personally reviewed all pertinent laboratory/tests results    Results from the past 24 hours: No results found for this or any previous visit (from the past 24 hour(s)).    Suicide/Homicide Risk Assessment:    Risk of Harm to Self:   Current Specific Risk Factors include: current depressive symptoms, current psychotic symptoms  Protective Factors: no current suicidal ideation, ability to communicate with staff on the unit, able to contract for safety on the unit, taking medications as ordered on the unit  Based on today's assessment, Alberto presents the following risk of harm to self: minimal    Risk of Harm to Others:  Current Specific Risk Factors include: current psychotic symptoms  Protective Factors: no current homicidal ideation, psychotic symptoms are less prominent, compliant with medications on the unit as ordered  Based on today's assessment, Alberto presents the following risk of harm to others: minimal    The following interventions are recommended: behavioral checks every 7 minutes, continued hospitalization on locked unit    Progress Toward Goals: limited improvement    Assessment/Plan   Principal Problem:    Schizoaffective disorder, bipolar type (HCC)  Active Problems:    GERD (gastroesophageal reflux disease)    Tobacco abuse    T wave inversion in EKG    Chronic idiopathic constipation    Vitamin B 12 deficiency    Vitamin D deficiency    Acanthosis nigricans    Rash    Class 2 obesity in adult      Recommended Treatment:     Planned medication and treatment changes:    All current active medications have been reviewed  Encourage group therapy, milieu therapy and occupational therapy  Behavioral Health checks every 7 minutes  Extended Acute Care referral  Continue ECT #13 in 2 days  Monitor weekly CBC  Continue current medications:    Current Facility-Administered  Medications   Medication Dose Route Frequency Provider Last Rate    acetaminophen  650 mg Oral Q6H PRN Yasmin Harvey MD      acetaminophen  650 mg Oral Q4H PRN Yasmin Harvey MD      acetaminophen  975 mg Oral Q6H PRN Yasmin Harvey MD      aluminum-magnesium hydroxide-simethicone  30 mL Oral Q4H PRN Yasmin Harvey MD      atropine  1 drop Sublingual HS Prisca Villafuerte, HOLLI      atropine  1 drop Sublingual Daily PRN HOLLI Anderson      haloperidol lactate  2.5 mg Intramuscular Q4H PRN Max 4/day Yasmin Harvey MD      And    LORazepam  1 mg Intramuscular Q4H PRN Max 4/day Yasmin Harvey MD      And    benztropine  0.5 mg Intramuscular Q4H PRN Max 4/day Yasmin Harvey MD      haloperidol lactate  5 mg Intramuscular Q4H PRN Max 4/day Yasmin Harvey MD      And    LORazepam  2 mg Intramuscular Q4H PRN Max 4/day Yasmin Harvey MD      And    benztropine  1 mg Intramuscular Q4H PRN Max 4/day Yasmin Harvey MD      benztropine  1 mg Oral Q4H PRN Max 6/day Yasmin Harvey MD      bisacodyl  10 mg Rectal Daily PRN Yasmin Harvey MD      cloZAPine  450 mg Oral HS Prisca Villafuerte, HOLLI      hydrOXYzine HCL  50 mg Oral Q6H PRN Max 4/day Yasmin Harvey MD      Or    diphenhydrAMINE  50 mg Intramuscular Q6H PRN Yasmin Harvey MD      diphenhydrAMINE-zinc acetate   Topical BID PRN Anjel Sagastume MD      escitalopram  20 mg Oral Daily La Flores MD      haloperidol  1 mg Oral Q6H PRN Yasmin Harvey MD      haloperidol  2.5 mg Oral Q4H PRN Max 4/day Yasmin Harvey MD      haloperidol  5 mg Oral Q4H PRN Max 4/day Yasmin Harvey MD      hydrALAZINE  5 mg Intravenous Q10 Min PRN Khoa Gutierrez CRNA      hydrocortisone   Topical 4x Daily PRN Eileen Jensen, HOLLI      hydrOXYzine HCL  100 mg Oral Q6H PRN Max 4/day Yasmin Harvey MD      Or    LORazepam  2 mg Intramuscular Q6H PRN Yasmin Harvey MD      hydrOXYzine HCL  25 mg Oral Q6H PRN Max 4/day Yasmin JACKMAN  MD Candice      ibuprofen  600 mg Oral Q8H PRN Eileen Gonzaleznathan, CRNP      lactated ringers  100 mL/hr Intravenous Continuous Elisa Workman  mL/hr (03/15/24 0700)    lactated ringers  100 mL/hr Intravenous Continuous Elisa Workman MD      lactated ringers  125 mL/hr Intravenous Continuous Vikas Madison MD Stopped (03/15/24 0756)    lactated ringers  100 mL/hr Intravenous Continuous Anjel Arriaza CRNA      lactated ringers  100 mL/hr Intravenous Continuous Elisa Workman  mL/hr (03/13/24 0610)    lactated ringers  125 mL/hr Intravenous Continuous Anthony Fontana MD Stopped (03/15/24 0756)    lidocaine (PF)  0.5 mL Infiltration Once PRN Anthony Fontana MD      melatonin  3 mg Oral HS Yasmin Harvey MD      metFORMIN  500 mg Oral Daily With Breakfast Eileen Jensen, CRNP      methocarbamol  500 mg Oral Q6H PRN Eileen Gonzaleznathan, CRNP      minocycline  100 mg Oral Q12H KAYLIE Eileen Gonzaleznathan, CRNP      nicotine polacrilex  4 mg Oral Q2H PRN Yasmin Harvey MD      ondansetron  4 mg Oral Q6H PRN Pia Mantilla, HOLLI      polyethylene glycol  17 g Oral Daily PRN Yasmin Harvey MD      polyethylene glycol  17 g Oral Daily Eileen Jensen, CRNP      propranolol  10 mg Oral Q12H [facility] Prisca Villafuerte, HOLLI      senna-docusate sodium  1 tablet Oral Daily PRN Yasmin Harvey MD      senna-docusate sodium  2 tablet Oral BID La Flores MD      traZODone  50 mg Oral HS PRN Yasmin Harvey MD      white petrolatum-mineral oil   Topical TID PRN Anjel Sagastume MD       Risks / Benefits of Treatment:    Risks, benefits, and possible side effects of medications explained to patient and patient verbalizes understanding and agreement for treatment.  Discussed risks and benefits of Electroconvulsive Treatment with patient including risks of anesthesia and memory loss. Alberto verbalized understanding and agreed for ECT.    Counseling / Coordination of  Care:    Patient's progress discussed with staff in treatment team meeting.  Medications, treatment progress and treatment plan reviewed with patient.    HOLLI Anderson 03/27/24

## 2024-03-28 PROCEDURE — 99232 SBSQ HOSP IP/OBS MODERATE 35: CPT | Performed by: PSYCHIATRY & NEUROLOGY

## 2024-03-28 RX ORDER — ONDANSETRON 2 MG/ML
4 INJECTION INTRAMUSCULAR; INTRAVENOUS ONCE AS NEEDED
Status: CANCELLED | OUTPATIENT
Start: 2024-03-28

## 2024-03-28 RX ORDER — ALBUTEROL SULFATE 2.5 MG/3ML
2.5 SOLUTION RESPIRATORY (INHALATION) ONCE AS NEEDED
Status: CANCELLED | OUTPATIENT
Start: 2024-03-28

## 2024-03-28 RX ORDER — SODIUM CHLORIDE, SODIUM LACTATE, POTASSIUM CHLORIDE, CALCIUM CHLORIDE 600; 310; 30; 20 MG/100ML; MG/100ML; MG/100ML; MG/100ML
100 INJECTION, SOLUTION INTRAVENOUS CONTINUOUS
Status: CANCELLED | OUTPATIENT
Start: 2024-03-28

## 2024-03-28 RX ADMIN — NICOTINE POLACRILEX 4 MG: 4 GUM, CHEWING ORAL at 12:54

## 2024-03-28 RX ADMIN — CLOZAPINE 450 MG: 25 TABLET ORAL at 21:33

## 2024-03-28 RX ADMIN — MINOCYCLINE HYDROCHLORIDE 100 MG: 100 CAPSULE ORAL at 21:34

## 2024-03-28 RX ADMIN — MELATONIN TAB 3 MG 3 MG: 3 TAB at 21:33

## 2024-03-28 RX ADMIN — NICOTINE POLACRILEX 4 MG: 4 GUM, CHEWING ORAL at 21:37

## 2024-03-28 RX ADMIN — MINOCYCLINE HYDROCHLORIDE 100 MG: 100 CAPSULE ORAL at 08:21

## 2024-03-28 RX ADMIN — AMLODIPINE BESYLATE 5 MG: 5 TABLET ORAL at 08:21

## 2024-03-28 RX ADMIN — PROPRANOLOL HYDROCHLORIDE 10 MG: 10 TABLET ORAL at 21:33

## 2024-03-28 RX ADMIN — METFORMIN HYDROCHLORIDE 500 MG: 500 TABLET ORAL at 08:21

## 2024-03-28 RX ADMIN — NICOTINE POLACRILEX 4 MG: 4 GUM, CHEWING ORAL at 19:28

## 2024-03-28 RX ADMIN — ATROPINE SULFATE 1 DROP: 10 SOLUTION/ DROPS OPHTHALMIC at 21:35

## 2024-03-28 RX ADMIN — SENNOSIDES AND DOCUSATE SODIUM 2 TABLET: 8.6; 5 TABLET ORAL at 08:21

## 2024-03-28 RX ADMIN — PROPRANOLOL HYDROCHLORIDE 10 MG: 10 TABLET ORAL at 08:21

## 2024-03-28 RX ADMIN — SENNOSIDES AND DOCUSATE SODIUM 2 TABLET: 8.6; 5 TABLET ORAL at 17:08

## 2024-03-28 RX ADMIN — NICOTINE POLACRILEX 4 MG: 4 GUM, CHEWING ORAL at 17:08

## 2024-03-28 RX ADMIN — ESCITALOPRAM OXALATE 20 MG: 10 TABLET ORAL at 08:21

## 2024-03-28 NOTE — NURSING NOTE
Patient is calm and cooperative upon approach. Patient continues to be report AH, but does not understand what the voices are saying. No prn's needed at this time. Patient denies other psych symptoms. Patient is compliant with meds and meals.

## 2024-03-28 NOTE — PROGRESS NOTES
"Progress Note - Behavioral Health     Alberto Berumen 28 y.o. male MRN: [id number]   Unit/Bed#: -02 Encounter: [id number]    Behavior over the last 24 hours: unchanged.     Alberto is a 28 y.o. male diagnosed with schizoaffective, bipolar type seen today in follow-up. Per staff, no acute events reported overnight. Recently SLIM notified due to elevated BP's & Norvasc was started. He is still endorsing AH despite ongoing rounds of ECT, recently approved for 3 more sessions. Otherwise calm and cooperative. Attending select groups & seen interacting with peers.     He is seen resting in bed comfortably without any signs of distress. He states he is doing, \"good\" offers no complaints. He is still reporting AH degrading regarding family, however, \"less\" since starting ECT. Otherwise has been tolerating medications without side effects. He denies change in mood symptoms, denies SI/HI. Awaiting EAC long-term placement.     Sleep: normal  Appetite: normal  Medication side effects: No   ROS: no complaints, all other systems are negative    Mental Status Evaluation:    Appearance:  age appropriate, marginal hygiene, room malodorous   Behavior:  cooperative, calm   Speech:  normal rate, normal volume, normal pitch   Mood:  \"Okay, still hearing voices\"   Affect:  constricted   Thought Process:  poverty of thought   Associations: concrete associations   Thought Content:  poverty of thought   Perceptual Disturbances: auditory hallucinations of voices that they are going to hurt his family   Risk Potential: Suicidal ideation - None at present  Homicidal ideation - None at present  Potential for aggression - No   Sensorium:  oriented to person, place, and time/date   Memory:  recent and remote memory grossly intact   Consciousness:  alert and awake   Attention/Concentration: attention span and concentration are age appropriate   Insight:  limited   Judgment: limited   Gait/Station: in bed   Motor Activity: no abnormal " movements     Vital signs in last 24 hours:    Temp:  [97.8 °F (36.6 °C)-98.7 °F (37.1 °C)] 97.8 °F (36.6 °C)  HR:  [101-112] 101  Resp:  [16-18] 18  BP: (130-137)/(83-87) 137/83    Laboratory results: I have personally reviewed all pertinent laboratory/tests results    Results from the past 24 hours: No results found for this or any previous visit (from the past 24 hour(s)).  Most Recent Labs:   Lab Results   Component Value Date    WBC 9.81 03/25/2024    RBC 5.17 03/25/2024    HGB 12.4 03/25/2024    HCT 40.8 03/25/2024     03/25/2024    RDW 13.0 03/25/2024    NEUTROABS 4.89 03/25/2024    SODIUM 141 03/25/2024    K 4.1 03/25/2024     03/25/2024    CO2 28 03/25/2024    BUN 16 03/25/2024    CREATININE 0.97 03/25/2024    GLUC 84 03/25/2024    CALCIUM 9.2 03/25/2024    AST 21 03/25/2024    ALT 44 03/25/2024    ALKPHOS 80 03/25/2024    TP 6.8 03/25/2024    ALB 3.8 03/25/2024    TBILI 0.30 03/25/2024    CHOLESTEROL 156 03/14/2024    HDL 44 03/14/2024    TRIG 133 03/14/2024    LDLCALC 85 03/14/2024    NONHDLC 112 03/14/2024    LITHIUM 0.61 01/09/2024    [id number] 1.062 11/15/2023    SYPHILISAB Non-reactive 11/18/2023    HGBA1C 5.8 (H) 01/23/2024     01/23/2024       Progress Toward Goals: limited improvement    Assessment/Plan   Principal Problem:    Schizoaffective disorder, bipolar type (HCC)  Active Problems:    GERD (gastroesophageal reflux disease)    Tobacco abuse    T wave inversion in EKG    Chronic idiopathic constipation    Vitamin B 12 deficiency    Vitamin D deficiency    Acanthosis nigricans    Rash    Class 2 obesity in adult      Recommended Treatment:     Planned medication and treatment changes:    All current active medications have been reviewed  Encourage group therapy, milieu therapy and occupational therapy  Behavioral Health checks every 7 minutes    Continue current medications  Continue ECT  Labs & ECG reviewed  D/C planning - awaiting EAC placement due to ongoing symptoms,  cannot discharge to lower level of care at present due to risk of decompensation    Current Facility-Administered Medications   Medication Dose Route Frequency Provider Last Rate    acetaminophen  650 mg Oral Q6H PRN Yasmin Harvey MD      acetaminophen  650 mg Oral Q4H PRN Yasmin Harvey MD      acetaminophen  975 mg Oral Q6H PRN Yasmin Harvey MD      aluminum-magnesium hydroxide-simethicone  30 mL Oral Q4H PRN Yasmin Harvey MD      amLODIPine  5 mg Oral Daily Eileen Jensen, HOLLI      atropine  1 drop Sublingual HS Prisca Villafuerte, CRJENNIE      atropine  1 drop Sublingual Daily PRN Prisac Villafuerte, CRNP      haloperidol lactate  2.5 mg Intramuscular Q4H PRN Max 4/day Yasmin Harvey MD      And    LORazepam  1 mg Intramuscular Q4H PRN Max 4/day Yasmin Harvey MD      And    benztropine  0.5 mg Intramuscular Q4H PRN Max 4/day Yasmin Harvey MD      haloperidol lactate  5 mg Intramuscular Q4H PRN Max 4/day Yasmin Harvey MD      And    LORazepam  2 mg Intramuscular Q4H PRN Max 4/day Yasmin Harvey MD      And    benztropine  1 mg Intramuscular Q4H PRN Max 4/day Yasmin Harvey MD      benztropine  1 mg Oral Q4H PRN Max 6/day Yasmin Harvey MD      bisacodyl  10 mg Rectal Daily PRN Yasmin Harvey MD      cloZAPine  450 mg Oral HS HOLLI Anderson      hydrOXYzine HCL  50 mg Oral Q6H PRN Max 4/day Yasmin Harvey MD      Or    diphenhydrAMINE  50 mg Intramuscular Q6H PRN Yasmin Harvey MD      diphenhydrAMINE-zinc acetate   Topical BID PRN Anjel Sagastume MD      escitalopram  20 mg Oral Daily La Flores MD      haloperidol  1 mg Oral Q6H PRN Yasmin Harvey MD      haloperidol  2.5 mg Oral Q4H PRN Max 4/day Yasmin Harvey MD      haloperidol  5 mg Oral Q4H PRN Max 4/day Yasmin Harvey MD      hydrALAZINE  5 mg Intravenous Q10 Min PRN Khoa Gutierrez CRNA      hydrocortisone   Topical 4x Daily PRN HOLLI Monge      hydrOXYzine HCL  100 mg Oral  Q6H PRN Max 4/day Yasmin Harvey MD      Or    LORazepam  2 mg Intramuscular Q6H PRN Yasmin Harvey MD      hydrOXYzine HCL  25 mg Oral Q6H PRN Max 4/day Yasmin Harvey MD      ibuprofen  600 mg Oral Q8H PRN HOLLI Monge      lactated ringers  100 mL/hr Intravenous Continuous Elisa Workman  mL/hr (03/15/24 0700)    lactated ringers  100 mL/hr Intravenous Continuous Elisa Workman MD      lactated ringers  125 mL/hr Intravenous Continuous Vikas Madison MD Stopped (03/15/24 0756)    lactated ringers  100 mL/hr Intravenous Continuous Anjel Arriaza CRNA      lactated ringers  100 mL/hr Intravenous Continuous Elisa Workman  mL/hr (03/13/24 0610)    lactated ringers  125 mL/hr Intravenous Continuous Anthony Fontana MD Stopped (03/15/24 0756)    lidocaine (PF)  0.5 mL Infiltration Once PRN Anthony Fontana MD      melatonin  3 mg Oral HS Yasmin Harvey MD      metFORMIN  500 mg Oral Daily With Breakfast Eileen Jensen, HOLLI      methocarbamol  500 mg Oral Q6H PRN HOLLI Monge      minocycline  100 mg Oral Q12H [facility] Eileen Jensen, HOLLI      nicotine polacrilex  4 mg Oral Q2H PRN Yasmin Harvey MD      ondansetron  4 mg Oral Q6H PRN HOLLI Galvan      polyethylene glycol  17 g Oral Daily PRN Yasmin Harvey MD      polyethylene glycol  17 g Oral Daily HOLLI Monge      propranolol  10 mg Oral Q12H [facility] HOLLI Anderson      senna-docusate sodium  1 tablet Oral Daily PRN Yasmin Harvey MD      senna-docusate sodium  2 tablet Oral BID La Flores MD      traZODone  50 mg Oral HS PRN Yasmin Harvey MD      white petrolatum-mineral oil   Topical TID PRN Anjel Sagastume MD       Risks / Benefits of Treatment:    Risks, benefits, and possible side effects of medications explained to patient and patient verbalizes understanding and agreement for treatment.    Counseling / Coordination of Care:    Total  floor / unit time spent today 25 minutes. Greater than 50% of total time was spent with the patient and / or family counseling and / or coordination of care. A description of counseling / coordination of care:  Patient's progress discussed with staff in treatment team meeting.  Medications, treatment progress and treatment plan reviewed with patient.    Verónica Jo PA-C 03/28/24

## 2024-03-28 NOTE — PROGRESS NOTES
03/28/24 0839   Team Meeting   Meeting Type Daily Rounds   Team Members Present   Team Members Present Physician;Nurse;   Physician Team Member Mark   Nursing Team Member Earl   Care Management Team Member Marco   Patient/Family Present   Patient Present No   Patient's Family Present No     SLIM notified due to elevated BP's. Pt continues to report AH. Pt calm, cooperative. Pt to continue with maintenance ECT. Med/meal compliant. Pursue EAC referral.

## 2024-03-28 NOTE — NURSING NOTE
Pt denies SI/HI/AVH. Pt is med compliant. PT visible on the unit and social with peers. Pt denies any further needs at the moment.

## 2024-03-29 ENCOUNTER — ANESTHESIA (INPATIENT)
Dept: PREOP/PACU | Facility: HOSPITAL | Age: 29
DRG: 750 | End: 2024-03-29
Payer: COMMERCIAL

## 2024-03-29 ENCOUNTER — HOSPITAL ENCOUNTER (INPATIENT)
Facility: HOSPITAL | Age: 29
DRG: 750 | End: 2024-03-29
Attending: PSYCHIATRY & NEUROLOGY | Admitting: PSYCHIATRY & NEUROLOGY
Payer: COMMERCIAL

## 2024-03-29 ENCOUNTER — ANESTHESIA EVENT (INPATIENT)
Dept: PREOP/PACU | Facility: HOSPITAL | Age: 29
DRG: 750 | End: 2024-03-29
Payer: COMMERCIAL

## 2024-03-29 ENCOUNTER — APPOINTMENT (INPATIENT)
Dept: PREOP/PACU | Facility: HOSPITAL | Age: 29
DRG: 750 | End: 2024-03-29
Attending: STUDENT IN AN ORGANIZED HEALTH CARE EDUCATION/TRAINING PROGRAM
Payer: COMMERCIAL

## 2024-03-29 VITALS
OXYGEN SATURATION: 98 % | RESPIRATION RATE: 16 BRPM | WEIGHT: 253 LBS | BODY MASS INDEX: 39.71 KG/M2 | DIASTOLIC BLOOD PRESSURE: 79 MMHG | TEMPERATURE: 98.6 F | SYSTOLIC BLOOD PRESSURE: 123 MMHG | HEIGHT: 67 IN | HEART RATE: 106 BPM

## 2024-03-29 DIAGNOSIS — F25.0 SCHIZOAFFECTIVE DISORDER, BIPOLAR TYPE (HCC): Primary | Chronic | ICD-10-CM

## 2024-03-29 DIAGNOSIS — L60.2 LONG TOENAIL: ICD-10-CM

## 2024-03-29 DIAGNOSIS — Z00.8 MEDICAL CLEARANCE FOR PSYCHIATRIC ADMISSION: ICD-10-CM

## 2024-03-29 DIAGNOSIS — E66.812 CLASS 2 OBESITY IN ADULT: ICD-10-CM

## 2024-03-29 DIAGNOSIS — K59.04 CHRONIC IDIOPATHIC CONSTIPATION: ICD-10-CM

## 2024-03-29 DIAGNOSIS — T78.40XD ALLERGY, SUBSEQUENT ENCOUNTER: ICD-10-CM

## 2024-03-29 DIAGNOSIS — I10 PRIMARY HYPERTENSION: ICD-10-CM

## 2024-03-29 DIAGNOSIS — K21.9 GASTROESOPHAGEAL REFLUX DISEASE WITHOUT ESOPHAGITIS: ICD-10-CM

## 2024-03-29 PROBLEM — R21 RASH: Status: RESOLVED | Noted: 2024-01-25 | Resolved: 2024-03-29

## 2024-03-29 PROCEDURE — GZB2ZZZ ELECTROCONVULSIVE THERAPY, BILATERAL-SINGLE SEIZURE: ICD-10-PCS | Performed by: STUDENT IN AN ORGANIZED HEALTH CARE EDUCATION/TRAINING PROGRAM

## 2024-03-29 PROCEDURE — 90870 ELECTROCONVULSIVE THERAPY: CPT | Performed by: STUDENT IN AN ORGANIZED HEALTH CARE EDUCATION/TRAINING PROGRAM

## 2024-03-29 PROCEDURE — 99239 HOSP IP/OBS DSCHRG MGMT >30: CPT | Performed by: PSYCHIATRY & NEUROLOGY

## 2024-03-29 PROCEDURE — 90870 ELECTROCONVULSIVE THERAPY: CPT

## 2024-03-29 RX ORDER — DIPHENHYDRAMINE HYDROCHLORIDE, ZINC ACETATE 2; .1 G/100G; G/100G
CREAM TOPICAL 2 TIMES DAILY PRN
Status: DISCONTINUED | OUTPATIENT
Start: 2024-03-29 | End: 2025-03-14 | Stop reason: HOSPADM

## 2024-03-29 RX ORDER — ATROPINE SULFATE 10 MG/ML
1 SOLUTION/ DROPS OPHTHALMIC
Status: DISCONTINUED | OUTPATIENT
Start: 2024-03-29 | End: 2024-06-10

## 2024-03-29 RX ORDER — PROPRANOLOL HYDROCHLORIDE 10 MG/1
10 TABLET ORAL EVERY 12 HOURS SCHEDULED
Status: CANCELLED | OUTPATIENT
Start: 2024-03-29

## 2024-03-29 RX ORDER — ACETAMINOPHEN 325 MG/1
650 TABLET ORAL EVERY 4 HOURS PRN
OUTPATIENT
Start: 2024-03-29

## 2024-03-29 RX ORDER — ACETAMINOPHEN 325 MG/1
975 TABLET ORAL EVERY 6 HOURS PRN
OUTPATIENT
Start: 2024-03-29

## 2024-03-29 RX ORDER — DIPHENHYDRAMINE HYDROCHLORIDE, ZINC ACETATE 2; .1 G/100G; G/100G
CREAM TOPICAL 2 TIMES DAILY PRN
Status: CANCELLED | OUTPATIENT
Start: 2024-03-29

## 2024-03-29 RX ORDER — METHOCARBAMOL 500 MG/1
500 TABLET, FILM COATED ORAL EVERY 6 HOURS PRN
Status: CANCELLED | OUTPATIENT
Start: 2024-03-29

## 2024-03-29 RX ORDER — SODIUM CHLORIDE, SODIUM LACTATE, POTASSIUM CHLORIDE, CALCIUM CHLORIDE 600; 310; 30; 20 MG/100ML; MG/100ML; MG/100ML; MG/100ML
INJECTION, SOLUTION INTRAVENOUS CONTINUOUS PRN
Status: DISCONTINUED | OUTPATIENT
Start: 2024-03-29 | End: 2024-03-29

## 2024-03-29 RX ORDER — HALOPERIDOL 5 MG/ML
5 INJECTION INTRAMUSCULAR
OUTPATIENT
Start: 2024-03-29

## 2024-03-29 RX ORDER — BENZTROPINE MESYLATE 1 MG/1
1 TABLET ORAL
Status: DISCONTINUED | OUTPATIENT
Start: 2024-03-29 | End: 2025-01-15

## 2024-03-29 RX ORDER — METHOCARBAMOL 500 MG/1
500 TABLET, FILM COATED ORAL EVERY 6 HOURS PRN
Status: DISCONTINUED | OUTPATIENT
Start: 2024-03-29 | End: 2025-03-14 | Stop reason: HOSPADM

## 2024-03-29 RX ORDER — ALBUTEROL SULFATE 2.5 MG/3ML
2.5 SOLUTION RESPIRATORY (INHALATION) ONCE AS NEEDED
Status: DISCONTINUED | OUTPATIENT
Start: 2024-03-29 | End: 2024-03-29 | Stop reason: HOSPADM

## 2024-03-29 RX ORDER — SUCCINYLCHOLINE/SOD CL,ISO/PF 100 MG/5ML
SYRINGE (ML) INTRAVENOUS AS NEEDED
Status: DISCONTINUED | OUTPATIENT
Start: 2024-03-29 | End: 2024-03-29

## 2024-03-29 RX ORDER — MAGNESIUM HYDROXIDE/ALUMINUM HYDROXICE/SIMETHICONE 120; 1200; 1200 MG/30ML; MG/30ML; MG/30ML
30 SUSPENSION ORAL EVERY 4 HOURS PRN
OUTPATIENT
Start: 2024-03-29

## 2024-03-29 RX ORDER — PROPRANOLOL HYDROCHLORIDE 10 MG/1
10 TABLET ORAL EVERY 12 HOURS SCHEDULED
Status: DISCONTINUED | OUTPATIENT
Start: 2024-03-29 | End: 2025-03-14 | Stop reason: HOSPADM

## 2024-03-29 RX ORDER — HALOPERIDOL 5 MG/ML
2.5 INJECTION INTRAMUSCULAR
OUTPATIENT
Start: 2024-03-29

## 2024-03-29 RX ORDER — BISACODYL 10 MG
10 SUPPOSITORY, RECTAL RECTAL DAILY PRN
OUTPATIENT
Start: 2024-03-29

## 2024-03-29 RX ORDER — ATROPINE SULFATE 10 MG/ML
1 SOLUTION/ DROPS OPHTHALMIC
Status: CANCELLED | OUTPATIENT
Start: 2024-03-29

## 2024-03-29 RX ORDER — POLYETHYLENE GLYCOL 3350 17 G/17G
17 POWDER, FOR SOLUTION ORAL DAILY
Status: CANCELLED | OUTPATIENT
Start: 2024-03-30

## 2024-03-29 RX ORDER — GLYCOPYRROLATE 0.2 MG/ML
INJECTION INTRAMUSCULAR; INTRAVENOUS AS NEEDED
Status: DISCONTINUED | OUTPATIENT
Start: 2024-03-29 | End: 2024-03-29

## 2024-03-29 RX ORDER — LANOLIN ALCOHOL/MO/W.PET/CERES
CREAM (GRAM) TOPICAL 3 TIMES DAILY PRN
Status: CANCELLED | OUTPATIENT
Start: 2024-03-29

## 2024-03-29 RX ORDER — KETOROLAC TROMETHAMINE 30 MG/ML
INJECTION, SOLUTION INTRAMUSCULAR; INTRAVENOUS AS NEEDED
Status: DISCONTINUED | OUTPATIENT
Start: 2024-03-29 | End: 2024-03-29

## 2024-03-29 RX ORDER — HYDROXYZINE 50 MG/1
50 TABLET, FILM COATED ORAL
OUTPATIENT
Start: 2024-03-29

## 2024-03-29 RX ORDER — AMLODIPINE BESYLATE 5 MG/1
5 TABLET ORAL DAILY
Status: CANCELLED | OUTPATIENT
Start: 2024-03-30

## 2024-03-29 RX ORDER — AMLODIPINE BESYLATE 5 MG/1
5 TABLET ORAL DAILY
Status: DISCONTINUED | OUTPATIENT
Start: 2024-03-30 | End: 2025-03-14 | Stop reason: HOSPADM

## 2024-03-29 RX ORDER — HALOPERIDOL 1 MG/1
1 TABLET ORAL EVERY 6 HOURS PRN
OUTPATIENT
Start: 2024-03-29

## 2024-03-29 RX ORDER — LIDOCAINE HYDROCHLORIDE 10 MG/ML
0.5 INJECTION, SOLUTION EPIDURAL; INFILTRATION; INTRACAUDAL; PERINEURAL ONCE AS NEEDED
OUTPATIENT
Start: 2024-03-29

## 2024-03-29 RX ORDER — LANOLIN ALCOHOL/MO/W.PET/CERES
3 CREAM (GRAM) TOPICAL
OUTPATIENT
Start: 2024-03-29

## 2024-03-29 RX ORDER — ONDANSETRON 2 MG/ML
4 INJECTION INTRAMUSCULAR; INTRAVENOUS ONCE AS NEEDED
Status: DISCONTINUED | OUTPATIENT
Start: 2024-03-29 | End: 2024-03-29 | Stop reason: HOSPADM

## 2024-03-29 RX ORDER — GUAIFENESIN 200 MG/10ML
LIQUID ORAL 3 TIMES DAILY PRN
Status: DISCONTINUED | OUTPATIENT
Start: 2024-03-29 | End: 2025-03-14 | Stop reason: HOSPADM

## 2024-03-29 RX ORDER — ESCITALOPRAM OXALATE 10 MG/1
20 TABLET ORAL DAILY
Status: CANCELLED | OUTPATIENT
Start: 2024-03-30

## 2024-03-29 RX ORDER — BENZTROPINE MESYLATE 1 MG/ML
1 INJECTION INTRAMUSCULAR; INTRAVENOUS
OUTPATIENT
Start: 2024-03-29

## 2024-03-29 RX ORDER — LABETALOL HYDROCHLORIDE 5 MG/ML
INJECTION, SOLUTION INTRAVENOUS AS NEEDED
Status: DISCONTINUED | OUTPATIENT
Start: 2024-03-29 | End: 2024-03-29

## 2024-03-29 RX ORDER — ESCITALOPRAM OXALATE 10 MG/1
20 TABLET ORAL DAILY
Status: DISCONTINUED | OUTPATIENT
Start: 2024-03-30 | End: 2025-03-14 | Stop reason: HOSPADM

## 2024-03-29 RX ORDER — AMOXICILLIN 250 MG
1 CAPSULE ORAL DAILY PRN
OUTPATIENT
Start: 2024-03-29

## 2024-03-29 RX ORDER — ESMOLOL HYDROCHLORIDE 10 MG/ML
INJECTION INTRAVENOUS AS NEEDED
Status: DISCONTINUED | OUTPATIENT
Start: 2024-03-29 | End: 2024-03-29

## 2024-03-29 RX ORDER — IBUPROFEN 600 MG/1
600 TABLET ORAL EVERY 8 HOURS PRN
Status: CANCELLED | OUTPATIENT
Start: 2024-03-29

## 2024-03-29 RX ORDER — SODIUM CHLORIDE, SODIUM LACTATE, POTASSIUM CHLORIDE, CALCIUM CHLORIDE 600; 310; 30; 20 MG/100ML; MG/100ML; MG/100ML; MG/100ML
75 INJECTION, SOLUTION INTRAVENOUS CONTINUOUS
OUTPATIENT
Start: 2024-03-29

## 2024-03-29 RX ORDER — METHOHEXITAL IN WATER/PF 100MG/10ML
SYRINGE (ML) INTRAVENOUS AS NEEDED
Status: DISCONTINUED | OUTPATIENT
Start: 2024-03-29 | End: 2024-03-29

## 2024-03-29 RX ORDER — POLYETHYLENE GLYCOL 3350 17 G/17G
17 POWDER, FOR SOLUTION ORAL DAILY PRN
OUTPATIENT
Start: 2024-03-29

## 2024-03-29 RX ORDER — HYDROXYZINE 50 MG/1
100 TABLET, FILM COATED ORAL
OUTPATIENT
Start: 2024-03-29

## 2024-03-29 RX ORDER — ONDANSETRON 4 MG/1
4 TABLET, ORALLY DISINTEGRATING ORAL EVERY 6 HOURS PRN
Status: DISCONTINUED | OUTPATIENT
Start: 2024-03-29 | End: 2025-03-14 | Stop reason: HOSPADM

## 2024-03-29 RX ORDER — BENZTROPINE MESYLATE 1 MG/ML
0.5 INJECTION INTRAMUSCULAR; INTRAVENOUS
OUTPATIENT
Start: 2024-03-29

## 2024-03-29 RX ORDER — DIAPER,BRIEF,INFANT-TODD,DISP
EACH MISCELLANEOUS 4 TIMES DAILY PRN
Status: CANCELLED | OUTPATIENT
Start: 2024-03-29

## 2024-03-29 RX ORDER — LORAZEPAM 2 MG/ML
1 INJECTION INTRAMUSCULAR
OUTPATIENT
Start: 2024-03-29

## 2024-03-29 RX ORDER — POLYETHYLENE GLYCOL 3350 17 G/17G
17 POWDER, FOR SOLUTION ORAL DAILY
Status: DISCONTINUED | OUTPATIENT
Start: 2024-03-30 | End: 2024-08-08

## 2024-03-29 RX ORDER — TRAZODONE HYDROCHLORIDE 50 MG/1
50 TABLET ORAL
OUTPATIENT
Start: 2024-03-29

## 2024-03-29 RX ORDER — ATROPINE SULFATE 10 MG/ML
1 SOLUTION/ DROPS OPHTHALMIC DAILY PRN
Status: CANCELLED | OUTPATIENT
Start: 2024-03-29

## 2024-03-29 RX ORDER — DIAPER,BRIEF,INFANT-TODD,DISP
EACH MISCELLANEOUS 4 TIMES DAILY PRN
Status: DISCONTINUED | OUTPATIENT
Start: 2024-03-29 | End: 2025-03-14 | Stop reason: HOSPADM

## 2024-03-29 RX ORDER — LORAZEPAM 2 MG/ML
2 INJECTION INTRAMUSCULAR EVERY 6 HOURS PRN
OUTPATIENT
Start: 2024-03-29

## 2024-03-29 RX ORDER — ONDANSETRON 4 MG/1
4 TABLET, ORALLY DISINTEGRATING ORAL EVERY 6 HOURS PRN
Status: CANCELLED | OUTPATIENT
Start: 2024-03-29

## 2024-03-29 RX ORDER — ATROPINE SULFATE 10 MG/ML
1 SOLUTION/ DROPS OPHTHALMIC DAILY PRN
Status: DISCONTINUED | OUTPATIENT
Start: 2024-03-29 | End: 2024-09-11

## 2024-03-29 RX ORDER — MINOCYCLINE HYDROCHLORIDE 100 MG/1
100 CAPSULE ORAL EVERY 12 HOURS SCHEDULED
Qty: 39 CAPSULE | Refills: 0 | Status: COMPLETED | OUTPATIENT
Start: 2024-03-29 | End: 2024-04-17

## 2024-03-29 RX ORDER — ACETAMINOPHEN 325 MG/1
650 TABLET ORAL EVERY 6 HOURS PRN
OUTPATIENT
Start: 2024-03-29

## 2024-03-29 RX ORDER — HYDROXYZINE HYDROCHLORIDE 25 MG/1
25 TABLET, FILM COATED ORAL
OUTPATIENT
Start: 2024-03-29

## 2024-03-29 RX ORDER — BENZTROPINE MESYLATE 1 MG/1
1 TABLET ORAL
Status: CANCELLED | OUTPATIENT
Start: 2024-03-29

## 2024-03-29 RX ORDER — IBUPROFEN 600 MG/1
600 TABLET, FILM COATED ORAL EVERY 8 HOURS PRN
Status: DISCONTINUED | OUTPATIENT
Start: 2024-03-29 | End: 2025-03-14 | Stop reason: HOSPADM

## 2024-03-29 RX ORDER — DIPHENHYDRAMINE HYDROCHLORIDE 50 MG/ML
50 INJECTION INTRAMUSCULAR; INTRAVENOUS EVERY 6 HOURS PRN
OUTPATIENT
Start: 2024-03-29

## 2024-03-29 RX ORDER — MINOCYCLINE HYDROCHLORIDE 100 MG/1
100 CAPSULE ORAL EVERY 12 HOURS SCHEDULED
Status: CANCELLED | OUTPATIENT
Start: 2024-03-29 | End: 2024-04-18

## 2024-03-29 RX ORDER — AMOXICILLIN 250 MG
2 CAPSULE ORAL 2 TIMES DAILY
Status: DISCONTINUED | OUTPATIENT
Start: 2024-03-30 | End: 2024-08-17

## 2024-03-29 RX ORDER — LORAZEPAM 2 MG/ML
2 INJECTION INTRAMUSCULAR
OUTPATIENT
Start: 2024-03-29

## 2024-03-29 RX ORDER — AMOXICILLIN 250 MG
2 CAPSULE ORAL 2 TIMES DAILY
Status: CANCELLED | OUTPATIENT
Start: 2024-03-29

## 2024-03-29 RX ORDER — SODIUM CHLORIDE, SODIUM LACTATE, POTASSIUM CHLORIDE, CALCIUM CHLORIDE 600; 310; 30; 20 MG/100ML; MG/100ML; MG/100ML; MG/100ML
100 INJECTION, SOLUTION INTRAVENOUS CONTINUOUS
Status: DISCONTINUED | OUTPATIENT
Start: 2024-03-29 | End: 2024-03-29 | Stop reason: HOSPADM

## 2024-03-29 RX ORDER — HALOPERIDOL 5 MG/1
5 TABLET ORAL
OUTPATIENT
Start: 2024-03-29

## 2024-03-29 RX ADMIN — SENNOSIDES AND DOCUSATE SODIUM 2 TABLET: 8.6; 5 TABLET ORAL at 09:03

## 2024-03-29 RX ADMIN — KETOROLAC TROMETHAMINE 30 MG: 30 INJECTION, SOLUTION INTRAMUSCULAR; INTRAVENOUS at 07:30

## 2024-03-29 RX ADMIN — SODIUM CHLORIDE, SODIUM LACTATE, POTASSIUM CHLORIDE, AND CALCIUM CHLORIDE: .6; .31; .03; .02 INJECTION, SOLUTION INTRAVENOUS at 06:40

## 2024-03-29 RX ADMIN — CLOZAPINE 450 MG: 25 TABLET ORAL at 21:44

## 2024-03-29 RX ADMIN — LABETALOL 20 MG/4 ML (5 MG/ML) INTRAVENOUS SYRINGE 20 MG: at 07:26

## 2024-03-29 RX ADMIN — MINOCYCLINE HYDROCHLORIDE 100 MG: 100 CAPSULE ORAL at 09:03

## 2024-03-29 RX ADMIN — Medication 50 MG: at 07:27

## 2024-03-29 RX ADMIN — NICOTINE POLACRILEX 4 MG: 4 GUM, CHEWING BUCCAL at 21:51

## 2024-03-29 RX ADMIN — PROPRANOLOL HYDROCHLORIDE 10 MG: 10 TABLET ORAL at 09:03

## 2024-03-29 RX ADMIN — METFORMIN HYDROCHLORIDE 500 MG: 500 TABLET ORAL at 09:00

## 2024-03-29 RX ADMIN — Medication 120 MG: at 07:24

## 2024-03-29 RX ADMIN — NICOTINE POLACRILEX 4 MG: 4 GUM, CHEWING ORAL at 15:41

## 2024-03-29 RX ADMIN — SENNOSIDES AND DOCUSATE SODIUM 2 TABLET: 8.6; 5 TABLET ORAL at 17:32

## 2024-03-29 RX ADMIN — PROPRANOLOL HYDROCHLORIDE 10 MG: 10 TABLET ORAL at 21:44

## 2024-03-29 RX ADMIN — ATROPINE SULFATE 1 DROP: 10 SOLUTION/ DROPS OPHTHALMIC at 22:54

## 2024-03-29 RX ADMIN — NICOTINE POLACRILEX 4 MG: 4 GUM, CHEWING ORAL at 13:17

## 2024-03-29 RX ADMIN — GLYCOPYRROLATE 0.2 MG: 0.2 INJECTION INTRAMUSCULAR; INTRAVENOUS at 07:21

## 2024-03-29 RX ADMIN — POLYETHYLENE GLYCOL 3350 17 G: 17 POWDER, FOR SOLUTION ORAL at 09:02

## 2024-03-29 RX ADMIN — NICOTINE POLACRILEX 4 MG: 4 GUM, CHEWING ORAL at 17:32

## 2024-03-29 RX ADMIN — MINOCYCLINE HYDROCHLORIDE 100 MG: 100 CAPSULE ORAL at 22:55

## 2024-03-29 RX ADMIN — ESCITALOPRAM OXALATE 20 MG: 10 TABLET ORAL at 09:03

## 2024-03-29 NOTE — PROGRESS NOTES
"Procedure Note - ECT  Alberto Berumen 28 y.o. male MRN: [id number]    Patient is doing fairly well today.  He remains blunted and scant in his interactions.  He does report some improvement with ECT.  He reports specifically feeling that ECT helps him \"get out of [his] head\".  He notes some memory impairment but finds that this does not bother him much.  He denies any headaches.  He denies any current SI or HI.  He reports auditory hallucinations but denies visual hallucinations.  Patient agreeable to proceed with ECT treatment today.    Time out was taken with staff to confirm correct patient and correct procedure to be performed.    Session Number: 013    Diagnosis: Principal Problem:    Schizoaffective disorder, bipolar type ([facility])  Active Problems:    GERD (gastroesophageal reflux disease)    Tobacco abuse    T wave inversion in EKG    Chronic idiopathic constipation    Vitamin B 12 deficiency    Vitamin D deficiency    Acanthosis nigricans    Rash    Class 2 obesity in adult      ECT Type: Inpatient, Acute    Anesthesia: Brevital    Electrode Placement: bilateral    Energy level: 100%      Seizure Duration     EE sec  EM sec observed, not detected by machine    PSI 90.2%     Results:Clinical seizure was satisfactory, Patient tolerated ECT well    Vitals:    24 0800   BP: 141/84   Pulse: 88   Resp: 12   Temp:    SpO2: 93%        Medication Administration - last 24 hours from 2024 0813 to 2024 0813         Date/Time Order Dose Route Action Action by     2024 2137 EDT nicotine polacrilex (NICORETTE) gum 4 mg 4 mg Oral Given Mavis Covarrubias RN     2024 1928 EDT nicotine polacrilex (NICORETTE) gum 4 mg 4 mg Oral Given Mavis Covarrubias RN     2024 1708 EDT nicotine polacrilex (NICORETTE) gum 4 mg 4 mg Oral Given Verona Wall RN     2024 1254 EDT nicotine polacrilex (NICORETTE) gum 4 mg 4 mg Oral Given Verona Wall RN     2024 2133 EDT melatonin tablet 3 mg " 3 mg Oral Given Mavis Covarrubias, RN     03/28/2024 0822 EDT polyethylene glycol (MIRALAX) packet 17 g 17 g Oral Not Given Amira Jefferson, RN     03/28/2024 0821 EDT escitalopram (LEXAPRO) tablet 20 mg 20 mg Oral Given Amira Jefferson, RN     03/28/2024 0821 EDT metFORMIN (GLUCOPHAGE) tablet 500 mg 500 mg Oral Given Amira Jefferson, RN     03/28/2024 2134 EDT minocycline (MINOCIN) capsule 100 mg 100 mg Oral Given Mavisconsuelo Covarrubias, RN     03/28/2024 0821 EDT minocycline (MINOCIN) capsule 100 mg 100 mg Oral Given Amira Jefferson, RN     03/28/2024 2133 EDT propranolol (INDERAL) tablet 10 mg 10 mg Oral Given Mavis Covarrubias, RN     03/28/2024 0821 EDT propranolol (INDERAL) tablet 10 mg 10 mg Oral Given Amira Jefferson, RN     03/28/2024 2135 EDT atropine (ISOPTO ATROPINE) 1 % ophthalmic solution 1 drop 1 drop Sublingual Given Mavis Covarrubias, RN     03/28/2024 1708 EDT senna-docusate sodium (SENOKOT S) 8.6-50 mg per tablet 2 tablet 2 tablet Oral Given Verona Wall, RN     03/28/2024 0821 EDT senna-docusate sodium (SENOKOT S) 8.6-50 mg per tablet 2 tablet 2 tablet Oral Given Amira Jefferson, MIGUEL     03/28/2024 2133 EDT cloZAPine (CLOZARIL) tablet 450 mg 450 mg Oral Given Mavis Covarrubias, RN     03/29/2024 0802 EDT lactated ringers infusion 0 mL/hr Intravenous Stopped Bhakti Flores, RN     03/28/2024 0821 EDT amLODIPine (NORVASC) tablet 5 mg 5 mg Oral Given Amira Jefferson RN             Media Information      Document Information    Clinical Image - Mobile Device   EEG   03/29/2024 07:32   Attached To:   Hospital Encounter on 11/14/23   Source Information    Guy Arroyo MD  [facility]     Guy Arroyo MD 03/29/24

## 2024-03-29 NOTE — LETTER
[facility]  [address] [address] [phone]  Dept: [phone]    March 19, 2025     Patient: Alberto Berumen   YOB: 1995   Date of Visit: 3/29/2024       To Whom it May Concern:    Alberto Berumen is under my professional care. He was seen in the hospital from 3/29/2024 to 03/14/25. He  was discharged home to his family .    If you have any questions or concerns, please don't hesitate to call.         Sincerely,          CHRIS SmithW

## 2024-03-29 NOTE — PROGRESS NOTES
"Progress Note - Behavioral Health     Alberto Berumen 28 y.o. male MRN: [id number]   Unit/Bed#: -02 Encounter: [id number]    Behavior over the last 24 hours: unchanged.     Alberto is a 28-year-old male diagnosed with schizoaffective disorder, bipolar type seen today in follow-up.  Per staff, remains generally calm, visible on the unit and socializes well with peers.  He was compliant with ECT this morning -did have satisfactory seizure. Otherwise he offers no acute medical complaints.    He is seen today at bedside.  He appears comfortable without any signs of distress.  Does report to feeling, \"tired\" as any post ECT headache or memory issues.  He is still complaining of ongoing auditory hallucinations however is unable to determine what they are saying.  Has been compliant with medications and is tolerating without any significant side effects.  Awaiting EAC placement.     Sleep: normal  Appetite: normal  Medication side effects: No   ROS: no complaints, all other systems are negative    Mental Status Evaluation:    Appearance:  age appropriate, marginal hygiene, looks stated age   Behavior:  cooperative, calm   Speech:  normal rate, normal volume, normal pitch   Mood:  euthymic   Affect:  flat   Thought Process:  poverty of thought   Associations: concrete associations   Thought Content:  poverty of thought   Perceptual Disturbances: auditory hallucinations   Risk Potential: Suicidal ideation - None at present  Homicidal ideation - None at present  Potential for aggression - No   Sensorium:  oriented to person, place, and time/date   Memory:  recent and remote memory grossly intact   Consciousness:  alert and awake   Attention/Concentration: attention span and concentration are age appropriate   Insight:  limited   Judgment: limited   Gait/Station: in bed   Motor Activity: no abnormal movements     Vital signs in last 24 hours:    Temp:  [97.8 °F (36.6 °C)-99 °F (37.2 °C)] 99 °F (37.2 °C)  HR:  [] " 85  Resp:  [12-19] 16  BP: (116-152)/() 118/65    Laboratory results: I have personally reviewed all pertinent laboratory/tests results    Results from the past 24 hours: No results found for this or any previous visit (from the past 24 hour(s)).  Most Recent Labs:   Lab Results   Component Value Date    WBC 9.81 03/25/2024    RBC 5.17 03/25/2024    HGB 12.4 03/25/2024    HCT 40.8 03/25/2024     03/25/2024    RDW 13.0 03/25/2024    NEUTROABS 4.89 03/25/2024    SODIUM 141 03/25/2024    K 4.1 03/25/2024     03/25/2024    CO2 28 03/25/2024    BUN 16 03/25/2024    CREATININE 0.97 03/25/2024    GLUC 84 03/25/2024    CALCIUM 9.2 03/25/2024    AST 21 03/25/2024    ALT 44 03/25/2024    ALKPHOS 80 03/25/2024    TP 6.8 03/25/2024    ALB 3.8 03/25/2024    TBILI 0.30 03/25/2024    CHOLESTEROL 156 03/14/2024    HDL 44 03/14/2024    TRIG 133 03/14/2024    LDLCALC 85 03/14/2024    NONHDLC 112 03/14/2024    LITHIUM 0.61 01/09/2024    [id number] 1.062 11/15/2023    SYPHILISAB Non-reactive 11/18/2023    HGBA1C 5.8 (H) 01/23/2024     01/23/2024       Progress Toward Goals: progressing    Assessment/Plan   Principal Problem:    Schizoaffective disorder, bipolar type (HCC)  Active Problems:    GERD (gastroesophageal reflux disease)    Tobacco abuse    T wave inversion in EKG    Chronic idiopathic constipation    Vitamin B 12 deficiency    Vitamin D deficiency    Acanthosis nigricans    Rash    Class 2 obesity in adult      Recommended Treatment:     Planned medication and treatment changes:    All current active medications have been reviewed  Encourage group therapy, milieu therapy and occupational therapy  Behavioral Health checks every 7 minutes    Continue current medications for now  Continue ECT  D/C planning -awaiting EAC placement due to ongoing symptoms, cannot discharge to a lower level of care at present due to risk of decompensation    Current Facility-Administered Medications   Medication Dose  Route Frequency Provider Last Rate    acetaminophen  650 mg Oral Q6H PRN Yasmin Harvey MD      acetaminophen  650 mg Oral Q4H PRN Yasmin Harvey MD      acetaminophen  975 mg Oral Q6H PRN Yasmin Harvey MD      albuterol  2.5 mg Nebulization Once PRN Elisa Workman MD      aluminum-magnesium hydroxide-simethicone  30 mL Oral Q4H PRN Yasmin Harvey MD      amLODIPine  5 mg Oral Daily Eileen Jensen, HOLLI      atropine  1 drop Sublingual HS Prisca Villafuerte, HOLLI      atropine  1 drop Sublingual Daily PRN HOLLI Anderson      haloperidol lactate  2.5 mg Intramuscular Q4H PRN Max 4/day Yasmin Harvey MD      And    LORazepam  1 mg Intramuscular Q4H PRN Max 4/day Yasmin Harvey MD      And    benztropine  0.5 mg Intramuscular Q4H PRN Max 4/day Yasmin Harvey MD      haloperidol lactate  5 mg Intramuscular Q4H PRN Max 4/day Yasmin Harvey MD      And    LORazepam  2 mg Intramuscular Q4H PRN Max 4/day Yasmin Harvey MD      And    benztropine  1 mg Intramuscular Q4H PRN Max 4/day Yasmin Harvey MD      benztropine  1 mg Oral Q4H PRN Max 6/day Yasmin Harvey MD      bisacodyl  10 mg Rectal Daily PRN Yasmin Harvey MD      cloZAPine  450 mg Oral HS HOLLI Anderson      hydrOXYzine HCL  50 mg Oral Q6H PRN Max 4/day Yasmin Harvey MD      Or    diphenhydrAMINE  50 mg Intramuscular Q6H PRN Yasmin Harvey MD      diphenhydrAMINE-zinc acetate   Topical BID PRN Anjel Sagastume MD      escitalopram  20 mg Oral Daily La Flores MD      haloperidol  1 mg Oral Q6H PRN Yasmin Harvey MD      haloperidol  2.5 mg Oral Q4H PRN Max 4/day Yasmin Harvey MD      haloperidol  5 mg Oral Q4H PRN Max 4/day Yasmin Harvey MD      hydrALAZINE  5 mg Intravenous Q10 Min PRN Khoa Norman Kleca, CRNA      hydrocortisone   Topical 4x Daily PRN HOLLI Monge      hydrOXYzine HCL  100 mg Oral Q6H PRN Max 4/day Yasmin Harvey MD      Or    LORazepam  2 mg  Intramuscular Q6H PRN Yasmin Harvey MD      hydrOXYzine HCL  25 mg Oral Q6H PRN Max 4/day Yasmin Harvey MD      ibuprofen  600 mg Oral Q8H PRN Eileen CherryHOLLI Jimenez      lactated ringers  100 mL/hr Intravenous Continuous Elisa Workman  mL/hr (03/15/24 0700)    lactated ringers  100 mL/hr Intravenous Continuous Elisa Workman MD      lactated ringers  125 mL/hr Intravenous Continuous Vikas Madison MD Stopped (03/15/24 0756)    lactated ringers  100 mL/hr Intravenous Continuous Anjel Arriaza CRNA      lactated ringers  100 mL/hr Intravenous Continuous Elisa Workman  mL/hr (03/13/24 0610)    lactated ringers  125 mL/hr Intravenous Continuous Anthony Fontana MD Stopped (03/15/24 0756)    lactated ringers  100 mL/hr Intravenous Continuous Elisa Workman MD      lidocaine (PF)  0.5 mL Infiltration Once PRN Anthony Fontana MD      melatonin  3 mg Oral HS Yasmin Harvey MD      metFORMIN  500 mg Oral Daily With Breakfast Eileen CherryHOLLI Jimenez      methocarbamol  500 mg Oral Q6H PRN Eileen HOLLI Higuera      minocycline  100 mg Oral Q12H [facility] EileenHOLLI Cummins      nicotine polacrilex  4 mg Oral Q2H PRN Yasmin Harvey MD      ondansetron  4 mg Intravenous Once PRN Elisa Workman MD      ondansetron  4 mg Oral Q6H PRN HOLLI Galvan      polyethylene glycol  17 g Oral Daily PRN Yasmin Harvey MD      polyethylene glycol  17 g Oral Daily Eileen HOLLI Higuera      propranolol  10 mg Oral Q12H [facility] HOLLI Anderson      senna-docusate sodium  1 tablet Oral Daily PRN Yasmin Harvey MD      senna-docusate sodium  2 tablet Oral BID La Flores MD      traZODone  50 mg Oral HS PRN Yasmin Harvey MD      white petrolatum-mineral oil   Topical TID PRN Anjel Sagastume MD       Risks / Benefits of Treatment:    Risks, benefits, and possible side effects of medications explained to patient and patient verbalizes  understanding and agreement for treatment.    Counseling / Coordination of Care:    Total floor / unit time spent today 25 minutes. Greater than 50% of total time was spent with the patient and / or family counseling and / or coordination of care. A description of counseling / coordination of care:  Patient's progress discussed with staff in treatment team meeting.  Medications, treatment progress and treatment plan reviewed with patient.    Verónica Jo PA-C 03/29/24

## 2024-03-29 NOTE — NURSING NOTE
"Patient is visible on unit, socializing with peers, walking in the hallway, and watching tv in dayroom. Patient remains calm, cooperative, and compliant with medications. Patient continues to endorse auditory hallucinations but \"they are less\"; patient states \"I'm trying not to focus on them\". Patient continues to utilize PRN Nicorette gum, no further  complaints or unmet needs identified at this time. Q 7 minute safety checks maintained.   "

## 2024-03-29 NOTE — BH TRANSITION RECORD
Contact Information: If you have any questions, concerns, pended studies, tests and/or procedures, or emergencies regarding your inpatient behavioral health visit. Please contact [place] [facility] unit [facility] [phone] and ask to speak to a , nurse or physician. A contact is available 24 hours/ 7 days a week at this number.     Summary of Procedures Performed During your Stay:  Below is a list of major procedures performed during your hospital stay and a summary of results:  - Operative/Interventional procedures: 13 sessions of ECT up until 3/29/24 during hospital stay. Most recent results show satisfactory seizure, given bilaterally for approx 29 sec. No noted complications throughout ECT course.  - Cardiac Procedures/Studies: Most recent ECG on 3/11/2024 - sinus tachycardia, non-specific T-wave abnormality, abnormal ECG; QTc = 461 ms. Patient did have various ECGs during admission to [facility]. Please refer to chart for further specifics.  .    Pending Studies (From admission, onward)       Start     Ordered    03/28/24 0000  Electroconvulsive therapy (ECT)  Every Mon-Wed-Fri      Order ID Start Status   [id number] 03/29/24 0000 Sent    04/01/24 0000 Scheduled    04/03/24 0000 Scheduled       03/27/24 1336    03/04/24 0600  CBC and differential  Every Monday      Start Status   04/01/24 0600 Scheduled   04/08/24 0600 Scheduled       02/28/24 0932    Unscheduled  Electroconvulsive therapy (ECT)  Once         03/27/24 1336    Unscheduled  Electroconvulsive therapy (ECT)  Once         03/27/24 1336    Unscheduled  Electroconvulsive therapy (ECT)  Once         03/27/24 1336    Unscheduled  Electroconvulsive therapy (ECT)  Once         03/28/24 0946    Unscheduled  Electroconvulsive therapy (ECT)  Once         03/28/24 0946    Unscheduled  Electroconvulsive therapy (ECT)  Once         03/28/24 0946    Unscheduled  Electroconvulsive therapy (ECT)  Once         03/28/24 0946                   Please follow up on the above pending studies with your PCP and/or referring provider.

## 2024-03-29 NOTE — NURSING NOTE
Vitals WDL. NPO after midnight. Denies pain/ discomfort. Pt off unit for ECT procedure. Escorted in a wheelchair with security on standby.

## 2024-03-29 NOTE — DISCHARGE SUMMARY
Discharge Summary - Behavioral Health   Alberto Berumen 28 y.o. male MRN: [id number]  Unit/Bed#: -02 Encounter: [id number]     Admission Date: 11/14/2023         Discharge Date: 3/29/24    Attending Psychiatrist: La Flores MD    Reason for Admission/HPI:     Per initial H&P by Dr. Mark MD:    Alberto Berumen is a 28 y.o. male with a history of Schizoaffective Disorder who was admitted to the inpatient psychiatric unit on a voluntary 201 commitment basis due to depression, auditory hallucinations, and suicidal ideation.     Symptoms prior to admission included worsening depression, suicidal ideation, poor appetite, difficulty sleeping, auditory hallucinations with derogatory comments, delusional thinking with persecutory delusions, difficulty attending to activities of daily living, and poor self-care. Onset of symptoms was gradual starting 1 week ago with progressively worsening course since that time. Stressors preceding admission included chronic mental illness. Alberto was brought in to ED by his mother due to worsening depression and worsening auditory hallucinations that were telling him that he would go to [facility] and that they would kill his family. He was not sleeping well at home and was eating only 1 meal per day. He was willing to sign a voluntary commitment for inpatient psychiatric treatment..     On initial evaluation after admission to the inpatient psychiatric unit Alberto still seemed very depressed, hopeless and helpless. He had a poor eye contact and flat affect. He.still reported suicidal thoughts without a plan, but said that he felt safe on the inpatient unit. He was willing to restart his psychotropic medications and said that he was compliant with medications and outpatient psychiatric treatment prior to admission.      Social History       Tobacco History       Smoking Status  Every Day Current Packs/Day  0.3 packs/day Smoking Tobacco Type  Cigarettes   Pack Year  History     Packs/Day From To Years    0.25   0.0      Smokeless Tobacco Use  Never      Tobacco Comments  Pt reports he does not want to quit and does not want information. He declines Quitline information              Alcohol History       Alcohol Use Status  Not Currently Comment  pt reports he does not drink              Drug Use       Drug Use Status  Not Currently              Sexual Activity       Sexually Active  Not Currently              Activities of Daily Living    Not Asked                 Additional Substance Use Detail       Questions Responses    Problems Due to Past Use of Alcohol? No    Problems Due to Past Use of Substances? No    Substance Use Assessment Denies substance use within the past 12 months    Alcohol Use Frequency Denies use in past 12 months    Cannabis frequency Never used    Comment:  Never used on 6/28/2023     Heroin Frequency Denies use in past 12 months    Cocaine frequency Never used    Comment:  Never used on 6/28/2023     Crack Cocaine Frequency Denies use in past 12 months    Methamphetamine Frequency Denies use in past 12 months    Narcotic Frequency Denies use in past 12 months    Benzodiazepine Frequency Denies use in past 12 months    Amphetamine frequency Denies use in past 12 months    Barbituate Frequency Denies use use in past 12 months    Inhalant frequency Never used    Comment:  Never used on 6/28/2023     Hallucinogen frequency Never used    Comment:  Never used on 6/28/2023     Ecstasy frequency Never used    Comment:  Never used on 6/28/2023     Other drug frequency Never used    Comment:  Never used on 6/28/2023     Opiate frequency Denies use in past 12 months    Last reviewed by Elisa Workman MD on 3/25/2024            Past Medical History:   Diagnosis Date    Anemia     Chronic idiopathic constipation     GERD (gastroesophageal reflux disease)     Schizoaffective disorder (HCC)     Syringoma     T wave inversion in EKG      Past Surgical History:    Procedure Laterality Date    COLONOSCOPY         Medications:    All current active medications have been reviewed.    Allergies:     Allergies   Allergen Reactions    Pollen Extract Sneezing       Objective     Vital signs in last 24 hours:    Temp:  [97.8 °F (36.6 °C)-99 °F (37.2 °C)] 99 °F (37.2 °C)  HR:  [] 85  Resp:  [12-19] 16  BP: (116-152)/() 118/65      Intake/Output Summary (Last 24 hours) at 3/29/2024 1410  Last data filed at 3/29/2024 0747  Gross per 24 hour   Intake 500 ml   Output --   Net 500 ml       Hospital Course:     Alberto was admitted to the inpatient psychiatric unit and started on Behavioral Health checks every 7 minutes. During the hospitalization he was encouraged to attend individual therapy, group therapy, milieu therapy and occupational therapy.    Psychiatric medications were adjusted over the hospital stay. To address depression, depressive symptoms, mood instability, psychotic symptoms, paranoid ideation, delusional thoughts, auditory hallucinations, and difficulty attending to ADLs and poor self-care , Alberto was treated with antidepressant Lexapro, antipsychotic medication Clozaril, and anxiolytic medication Propranolol. Due to ongoing symptom severity, was also given 13/15 sessions of ECT during hospitalization. Medication doses were adjusted during the hospital course:    Medications at time of discharge:  Clozaril 450 mg for ongoing psychosis  Clozaril monitoring:  CBC with differential on 3/25/2024, ANC = 4.89  Troponin's on 3/11/2024 within normal limits  BNP on 3/11/2024 within normal limits  CK on 3/11/2024 within normal limits  CRP on 3/11/2024 currently elevated at 11.6  Senokot twice daily for bowel regimen secondary to Clozaril therapy  ECT  Throughout hospital course, Alberto did receive 13/15  courses of bilateral ECT  Initial plan to continue on 4/1 and 4/3 then to further transition to weekly, biweekly and then monthly maintenance ECT  Lexapro 20 mg  daily for depression and anxiety symptoms  Propranolol 10 mg twice daily for anxiety symptoms     Medication trials during hospital course: Haldol, Loxapine, Cogentin, Lithium, Remeron, Zyprexa, trazodone.     Prior to beginning of treatment medications risks and benefits and possible side effects including risk of suicidality and serotonin syndrome related to treatment with antidepressants, risks of agranulocytosis related to treatment with Clozaril, and risks of Electroconvulsive Treatment including risks of anesthesia and risk of memory loss were reviewed with Alberto. He verbalized understanding and agreement for treatment. Upon admission Alberto was seen by medical service for medical clearance for inpatient treatment and medical follow up.    Alberto's symptoms did not improve over the hospital course. Initially after admission he was still feeling depressed, anxious, overwhelmed, delusional, paranoid, psychotic, and exhibited poor self-care. With adjustment of medications and therapeutic milieu his symptoms persisted. At the end of treatment Alberto was still unstable psychiatrically. His mood was stable at the time of discharge. Alberto denied suicidal ideation, intent or plan at the time of discharge and denied homicidal ideation, intent or plan at the time of discharge. Alberto was participating appropriately in milieu at the time of discharge. Behavior was appropriate on the unit at the time of discharge. Sleep and appetite were improved. Alberto still had psychotic symptoms at time of discharge, reporting that he was having ongoing AH about voices harming his family or most recently voices that are saying they will kill him. Alberto was tolerating medications and was not reporting any significant side effects at the time of discharge.    Alberto was transferred to an Extended Acute Care unit for a long term inpatient psychiatric treatment.    Mental Status at Time of Discharge:     Appearance:  marginal  "hygiene, looks stated age   Behavior:  cooperative, guarded   Speech:  normal rate, normal volume, normal pitch   Mood:  euthymic, \"alright\"   Affect:  constricted   Thought Process:  linear   Associations: concrete associations   Thought Content:  poverty of thought   Perceptual Disturbances: auditory hallucinations   Risk Potential: Suicidal ideation - None at present  Homicidal ideation - None at present  Potential for aggression - No   Sensorium:  oriented to person, place, and time/date   Memory:  recent and remote memory grossly intact   Consciousness:  alert and awake   Attention/Concentration: attention span and concentration are age appropriate   Insight:  limited   Judgment: limited   Gait/Station: in bed   Motor Activity: no abnormal movements       Admission Diagnosis:    Principal Problem:    Schizoaffective disorder, bipolar type ([facility])  Active Problems:    GERD (gastroesophageal reflux disease)    Tobacco abuse    T wave inversion in EKG    Chronic idiopathic constipation    Vitamin B 12 deficiency    Vitamin D deficiency    Acanthosis nigricans    Rash    Class 2 obesity in adult      Discharge Diagnosis:     Principal Problem:    Schizoaffective disorder, bipolar type ([facility])  Active Problems:    GERD (gastroesophageal reflux disease)    Tobacco abuse    T wave inversion in EKG    Chronic idiopathic constipation    Vitamin B 12 deficiency    Vitamin D deficiency    Acanthosis nigricans    Rash    Class 2 obesity in adult  Resolved Problems:    * No resolved hospital problems. *      Lab Results: I have personally reviewed all pertinent laboratory/tests results.  Most Recent Labs:   Lab Results   Component Value Date    WBC 9.81 03/25/2024    RBC 5.17 03/25/2024    HGB 12.4 03/25/2024    HCT 40.8 03/25/2024     03/25/2024    RDW 13.0 03/25/2024    NEUTROABS 4.89 03/25/2024    SODIUM 141 03/25/2024    K 4.1 03/25/2024     03/25/2024    CO2 28 03/25/2024    BUN 16 03/25/2024    CREATININE " 0.97 03/25/2024    GLUC 84 03/25/2024    GLUF 97 01/11/2024    CALCIUM 9.2 03/25/2024    AST 21 03/25/2024    ALT 44 03/25/2024    ALKPHOS 80 03/25/2024    TP 6.8 03/25/2024    ALB 3.8 03/25/2024    TBILI 0.30 03/25/2024    CHOLESTEROL 156 03/14/2024    HDL 44 03/14/2024    TRIG 133 03/14/2024    LDLCALC 85 03/14/2024    NONHDLC 112 03/14/2024    LITHIUM 0.61 01/09/2024    WYC0VVOVUTGV 1.062 11/15/2023    HGBA1C 5.8 (H) 01/23/2024     01/23/2024       Discharge Medications:    See after visit summary for all reconciled discharge medications provided to patient and family.      Discharge instructions/Information to patient and family:     See after visit summary for information provided to patient and family.      Provisions for Follow-Up Care:    See after visit summary for information related to follow-up care and any pertinent home health orders.      Discharge Statement:    I spent 35 minutes discharging the patient. This time was spent on the day of discharge. I had direct contact with the patient on the day of discharge.     Additional documentation is required if more than 30 minutes were spent on discharge:    I reviewed with Alberto importance of compliance with medications and outpatient treatment after discharge.  I discussed the medication regimen and possible side effects of the medications with Alberto prior to discharge. At the time of discharge he was tolerating psychiatric medications.  Alberto was transferred to an Extended Acute Care unit for a long term inpatient psychiatric treatment  I discussed with Alberto upon his transfer need to continue working on his recovery. He expressed motivation to participate in Extended Acute Care program and was hopeful that his mood would improve while he was in a long term psychiatric treatment.  I discussed with Alberto continuation of Electroconvulsive Treatment after transfer to [facility].     Discharge on Two Antipsychotic Medications : Tabitha Ya  JOSÉ MIGUEL Jo 03/29/24

## 2024-03-29 NOTE — PROGRESS NOTES
03/29/24 1458   Discharge Planning   Living Arrangements Lives w/ Family members   Support Systems Family members   Type of Current Residence Private residence   Current Home Care Services No   Other Referral/Resources/Interventions Provided:   Post Acute Placement to: Extended Acute Care   Government Services: Medical Assistance   Discharge Communications   Discharge planning discussed with: patient / Family   Contacts   Patient Contacts Sister   Relationship to Patient: Family   Contact Method Phone   Phone Number [phone]   Reason/Outcome Emergency Contact   [facility]   Would you like to participate in our [facility] service program?   No - Declined

## 2024-03-29 NOTE — NURSING NOTE
"Patient calm and cooperative upon approach. Patient visible in dayroom for meals, but mostly isolative to room, resting on bed. Patient reports AH of voices saying, \"Im going to kill you.\" Patient verbalizes feeling safe on unit. Norvasc 5 mg PO held, due to b/p not in parameters. Patient compliant with meds and meals.   "

## 2024-03-29 NOTE — PROGRESS NOTES
03/29/24 1518   Team Meeting   Meeting Type Daily Rounds   Team Members Present   Team Members Present Physician;Nurse;   Physician Team Member Mark   Nursing Team Member Giancarlo   Care Management Team Member Miguelito   Patient/Family Present   Patient Present No   Patient's Family Present No     Patient currently holds 201 status. Patient is medication and meal compliant. Patient continues to report AH. Patient to continue on all current scheduled medications. Patient to discharge today to [facility].

## 2024-03-29 NOTE — DISCHARGE INSTR - OTHER ORDERS
24/7 Mental Health Crisis Hotline  [facility]  [phone]    [employer] Toll Free: [phone]/7       Glo, or Mary Jo, our Behavioral Health Nurse Navigators, will be calling you after your discharge, on the phone number that you provided.  They will be available as an additional support, if needed.   If you wish to speak with one of them, you may contact Glo at [phone] or Mary Jo at [phone].

## 2024-03-29 NOTE — NURSING NOTE
Patient returned to unit from ECT. Patient Alert and Orientatedx4. Patient V/S are WNL. No complaints of discomfort or pain at this time.

## 2024-03-30 PROBLEM — I10 PRIMARY HYPERTENSION: Status: ACTIVE | Noted: 2024-03-30

## 2024-03-30 PROBLEM — R73.09 ELEVATED HEMOGLOBIN A1C: Status: ACTIVE | Noted: 2024-03-30

## 2024-03-30 LAB — CLOZAPINE SERPL-MCNC: 636 NG/ML (ref 350–900)

## 2024-03-30 PROCEDURE — 80159 DRUG ASSAY CLOZAPINE: CPT | Performed by: PSYCHIATRY & NEUROLOGY

## 2024-03-30 PROCEDURE — 99223 1ST HOSP IP/OBS HIGH 75: CPT | Performed by: PSYCHIATRY & NEUROLOGY

## 2024-03-30 PROCEDURE — 99253 IP/OBS CNSLTJ NEW/EST LOW 45: CPT | Performed by: NURSE PRACTITIONER

## 2024-03-30 RX ORDER — OLANZAPINE 10 MG/2ML
5 INJECTION, POWDER, FOR SOLUTION INTRAMUSCULAR
Status: DISCONTINUED | OUTPATIENT
Start: 2024-03-30 | End: 2025-03-14 | Stop reason: HOSPADM

## 2024-03-30 RX ORDER — MAGNESIUM HYDROXIDE/ALUMINUM HYDROXICE/SIMETHICONE 120; 1200; 1200 MG/30ML; MG/30ML; MG/30ML
30 SUSPENSION ORAL EVERY 4 HOURS PRN
Status: DISCONTINUED | OUTPATIENT
Start: 2024-03-30 | End: 2025-03-14 | Stop reason: HOSPADM

## 2024-03-30 RX ORDER — OLANZAPINE 5 MG/1
5 TABLET ORAL
Status: DISCONTINUED | OUTPATIENT
Start: 2024-03-30 | End: 2025-03-14 | Stop reason: HOSPADM

## 2024-03-30 RX ORDER — ACETAMINOPHEN 325 MG/1
650 TABLET ORAL EVERY 6 HOURS PRN
Status: DISCONTINUED | OUTPATIENT
Start: 2024-03-30 | End: 2024-04-13

## 2024-03-30 RX ORDER — BENZTROPINE MESYLATE 1 MG/1
1 TABLET ORAL
Status: DISCONTINUED | OUTPATIENT
Start: 2024-03-30 | End: 2025-01-15

## 2024-03-30 RX ORDER — AMOXICILLIN 250 MG
1 CAPSULE ORAL DAILY PRN
Status: DISCONTINUED | OUTPATIENT
Start: 2024-03-30 | End: 2025-03-14 | Stop reason: HOSPADM

## 2024-03-30 RX ORDER — ACETAMINOPHEN 325 MG/1
975 TABLET ORAL EVERY 6 HOURS PRN
Status: DISCONTINUED | OUTPATIENT
Start: 2024-03-30 | End: 2024-04-12 | Stop reason: SDUPTHER

## 2024-03-30 RX ORDER — HYDROXYZINE HYDROCHLORIDE 50 MG/1
50 TABLET, FILM COATED ORAL
Status: DISCONTINUED | OUTPATIENT
Start: 2024-03-30 | End: 2025-03-14 | Stop reason: HOSPADM

## 2024-03-30 RX ORDER — DIPHENHYDRAMINE HYDROCHLORIDE 50 MG/ML
50 INJECTION, SOLUTION INTRAMUSCULAR; INTRAVENOUS EVERY 6 HOURS PRN
Status: DISCONTINUED | OUTPATIENT
Start: 2024-03-30 | End: 2025-03-14 | Stop reason: HOSPADM

## 2024-03-30 RX ORDER — ACETAMINOPHEN 325 MG/1
650 TABLET ORAL EVERY 4 HOURS PRN
Status: DISCONTINUED | OUTPATIENT
Start: 2024-03-30 | End: 2025-03-14 | Stop reason: HOSPADM

## 2024-03-30 RX ORDER — OLANZAPINE 10 MG/2ML
2.5 INJECTION, POWDER, FOR SOLUTION INTRAMUSCULAR
Status: DISCONTINUED | OUTPATIENT
Start: 2024-03-30 | End: 2025-03-14 | Stop reason: HOSPADM

## 2024-03-30 RX ORDER — LORAZEPAM 2 MG/ML
2 INJECTION INTRAMUSCULAR EVERY 6 HOURS PRN
Status: DISCONTINUED | OUTPATIENT
Start: 2024-03-30 | End: 2025-03-14 | Stop reason: HOSPADM

## 2024-03-30 RX ORDER — POLYETHYLENE GLYCOL 3350 17 G/17G
17 POWDER, FOR SOLUTION ORAL DAILY PRN
Status: DISCONTINUED | OUTPATIENT
Start: 2024-03-30 | End: 2024-09-01 | Stop reason: SDUPTHER

## 2024-03-30 RX ORDER — HYDROXYZINE HYDROCHLORIDE 25 MG/1
25 TABLET, FILM COATED ORAL
Status: DISCONTINUED | OUTPATIENT
Start: 2024-03-30 | End: 2025-03-14 | Stop reason: HOSPADM

## 2024-03-30 RX ORDER — OLANZAPINE 2.5 MG/1
2.5 TABLET, FILM COATED ORAL
Status: DISCONTINUED | OUTPATIENT
Start: 2024-03-30 | End: 2025-03-14 | Stop reason: HOSPADM

## 2024-03-30 RX ORDER — BENZTROPINE MESYLATE 1 MG/ML
1 INJECTION, SOLUTION INTRAMUSCULAR; INTRAVENOUS
Status: DISCONTINUED | OUTPATIENT
Start: 2024-03-30 | End: 2025-01-15

## 2024-03-30 RX ORDER — HYDROXYZINE HYDROCHLORIDE 50 MG/1
100 TABLET, FILM COATED ORAL
Status: DISCONTINUED | OUTPATIENT
Start: 2024-03-30 | End: 2025-03-14 | Stop reason: HOSPADM

## 2024-03-30 RX ADMIN — ESCITALOPRAM OXALATE 20 MG: 10 TABLET, FILM COATED ORAL at 08:43

## 2024-03-30 RX ADMIN — NICOTINE POLACRILEX 4 MG: 4 GUM, CHEWING BUCCAL at 19:57

## 2024-03-30 RX ADMIN — MINOCYCLINE HYDROCHLORIDE 100 MG: 100 CAPSULE ORAL at 21:45

## 2024-03-30 RX ADMIN — CLOZAPINE 450 MG: 25 TABLET ORAL at 21:37

## 2024-03-30 RX ADMIN — NICOTINE POLACRILEX 4 MG: 4 GUM, CHEWING BUCCAL at 17:00

## 2024-03-30 RX ADMIN — PROPRANOLOL HYDROCHLORIDE 10 MG: 10 TABLET ORAL at 21:37

## 2024-03-30 RX ADMIN — MINOCYCLINE HYDROCHLORIDE 100 MG: 100 CAPSULE ORAL at 08:43

## 2024-03-30 RX ADMIN — ATROPINE SULFATE 1 DROP: 10 SOLUTION/ DROPS OPHTHALMIC at 21:45

## 2024-03-30 RX ADMIN — SENNOSIDES AND DOCUSATE SODIUM 2 TABLET: 8.6; 5 TABLET ORAL at 08:42

## 2024-03-30 RX ADMIN — PROPRANOLOL HYDROCHLORIDE 10 MG: 10 TABLET ORAL at 08:43

## 2024-03-30 RX ADMIN — METFORMIN HYDROCHLORIDE 500 MG: 500 TABLET ORAL at 08:43

## 2024-03-30 RX ADMIN — AMLODIPINE BESYLATE 5 MG: 5 TABLET ORAL at 08:42

## 2024-03-30 RX ADMIN — SENNOSIDES AND DOCUSATE SODIUM 2 TABLET: 8.6; 5 TABLET ORAL at 17:00

## 2024-03-30 RX ADMIN — POLYETHYLENE GLYCOL 3350 17 G: 17 POWDER, FOR SOLUTION ORAL at 08:42

## 2024-03-30 RX ADMIN — MELATONIN TAB 3 MG 3 MG: 3 TAB at 21:45

## 2024-03-30 NOTE — NURSING NOTE
Patient left unit with [facility] and  staff. Patient did not require any physical assistance with leaving and ambulated with steady gait.
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
## 2024-03-30 NOTE — NURSING NOTE
Patient is calm, pleasant, and cooperative. Visible on milieu but minimal interactions w/ peers noted. Med and meal comp. No group attendance. No overt responding observed. No behavioral issues.

## 2024-03-30 NOTE — ASSESSMENT & PLAN NOTE
Patient has been inpatient since January 2024 prior to that he was a daily tobacco abuser  He will continue with his nicotine gum for nicotine replacement therapy  Continue to educate regarding smoking cessation

## 2024-03-30 NOTE — CONSULTS
[facility]  Consult  Name: Alberto Berumen 28 y.o. male I MRN: [id number]  Unit/Bed#: [facility] 107-01 I Date of Admission: 3/29/2024   Date of Service: 3/30/2024 I Hospital Day: 1    Inpatient consult for Medical Clearance for  patient  Consult performed by: HOLLI Galvan  Consult ordered by: Jordan C Holter, DO          Assessment/Plan   Medical clearance for psychiatric admission  Assessment & Plan  Patient was transferred to the [facility] on 3/29/2024 vital signs stable afebrile patient seen and examined by myself last time he had labs was 3/25/2024 they were reviewed by myself sodium 141 potassium 4.1 creatinine 0.97 patient is scheduled to have routine labs again on 4/1/2024  Patient medically stable for admit   IM will sign off please call with any questions or concerns    Elevated hemoglobin A1c  Assessment & Plan  Patient had a 30 pound weight gain when he was on the [facility] and his A1c increased to 5.8 it was in the 4 range  Patient was put on a carb controlled diet  He was started on Glucophage 500 mg p.o. daily  He will have an A1c drawn on 4/1/2024 and based off of that I will make further changes if need be    Primary hypertension  Assessment & Plan  Patient will continue on his Norvasc and Inderal as ordered  Will continue to monitor his blood pressure and we will add agents or delete them as necessary    Confluent and reticulate papillomatosis  Assessment & Plan  Patient had a telemedicine visit with dermatology where when he was on the [facility] secondary to having a rash  They recommended he be on minocycline 100 mg p.o. twice daily x 3 months he has 39 more doses of this to continue while he is in the [facility]    Chronic idiopathic constipation  Assessment & Plan  Patient will continue on daily MiraLAX and daily senna  We will continue to monitor his bowel movements as needed and add agents as needed    T wave inversion in EKG  Assessment & Plan  Patient had a cardiology  consult on 1/16/2024 due to abnormal EKG changes and initiation on cholesterol  Patient has been stable and if we need to we can reconsult cardiology    Tobacco abuse  Assessment & Plan  Patient has been inpatient since January 2024 prior to that he was a daily tobacco abuser  He will continue with his nicotine gum for nicotine replacement therapy  Continue to educate regarding smoking cessation    Schizoaffective disorder, bipolar type (HCC)  Assessment & Plan  Psych Kathleen lorenzo is the primary team and they will manage this    GERD (gastroesophageal reflux disease)  Assessment & Plan  Patient is taking Maalox as needed and this is keeping the problem under control           Counseling / Coordination of Care Time: 45 minutes.  Greater than 50% of total time spent on patient counseling and coordination of care.    Collaboration of Care: Were Recommendations Directly Discussed with Primary Treatment Team? - No     History of Present Illness:    Alberto Berumen is a 28 y.o. male who is originally admitted to the psychiatry service due to schizoaffective disorder. We are consulted for medical clearance for admission to Behavioral Health Unit and treatment of underlying psychiatric illness.      Patient has a longstanding history of schizoaffective disorder greater than 1 year he was admitted to the [facility] on 11/14/2023 and he was discharged to the [facility] on 3/29/2024 he has schizoaffective disorder and depression auditory hallucinations and suicidal ideations.  He continues to report auditory hallucination and is distressed by the hallucinations, but he also notices slight improvement in perceptual disturbances at times he does not understand them.  He continues to report depressive symptoms.  He requires a long-term psychiatric treatment and was transferred to the [facility] on 3/29/2024.    Past medical history is significant for for chronic idiopathic constipation, abnormal EKG, tobacco abuse, GERD, hypertension, elevated A1c with  a recent weight gain of 30 pounds, and a confluent and reticulate Q related rash.    Review of Systems:    Review of Systems   Constitutional:  Negative for chills and fever.   HENT:  Negative for ear pain and sore throat.    Eyes:  Negative for pain and visual disturbance.   Respiratory:  Negative for cough and shortness of breath.    Cardiovascular:  Negative for chest pain and palpitations.   Gastrointestinal:  Negative for abdominal pain and vomiting.   Genitourinary:  Negative for dysuria and hematuria.   Musculoskeletal:  Negative for arthralgias and back pain.   Skin:  Negative for color change and rash.   Neurological:  Negative for seizures and syncope.   Psychiatric/Behavioral:  Positive for dysphoric mood.    All other systems reviewed and are negative.      Past Medical and Surgical History:     Past Medical History:   Diagnosis Date    Anemia     Chronic idiopathic constipation     GERD (gastroesophageal reflux disease)     Schizoaffective disorder (HCC)     Syringoma     T wave inversion in EKG        Past Surgical History:   Procedure Laterality Date    COLONOSCOPY         Meds/Allergies:    all medications and allergies reviewed    Allergies:   Allergies   Allergen Reactions    Pollen Extract Sneezing       Social History:     Marital Status: Single    Substance Use History:   Social History     Substance and Sexual Activity   Alcohol Use Not Currently    Comment: pt reports he does not drink     Social History     Tobacco Use   Smoking Status Every Day    Current packs/day: 0.25    Types: Cigarettes   Smokeless Tobacco Never   Tobacco Comments    Pt reports he does not want to quit and does not want information. He declines Quitline information     Social History     Substance and Sexual Activity   Drug Use Not Currently       Family History:    non-contributory    Physical Exam:     Vitals:   Blood Pressure: 133/84 (03/30/24 0841)  Pulse: 83 (03/30/24 0841)  Temperature: 97.6 °F (36.4 °C) (03/29/24  "2225)  Temp Source: Tympanic (03/29/24 2225)  Respirations: 18 (03/29/24 2225)  Height: 5' 6\" (167.6 cm) (03/29/24 2225)  Weight - Scale: 116 kg (256 lb) (03/29/24 2225)  SpO2: 99 % (03/29/24 2000)    Physical Exam  HENT:      Head: Normocephalic and atraumatic.      Nose: Nose normal.      Mouth/Throat:      Mouth: Mucous membranes are moist.   Eyes:      Extraocular Movements: Extraocular movements intact.   Cardiovascular:      Rate and Rhythm: Normal rate and regular rhythm.   Pulmonary:      Effort: Pulmonary effort is normal.      Breath sounds: Normal breath sounds.   Abdominal:      General: Abdomen is flat. Bowel sounds are normal.   Musculoskeletal:         General: Normal range of motion.      Cervical back: Normal range of motion.   Skin:     General: Skin is warm and dry.   Neurological:      Mental Status: He is alert and oriented to person, place, and time. Mental status is at baseline.   Psychiatric:         Mood and Affect: Mood normal.         Additional Data:     Lab Results: I have personally reviewed pertinent reports.      Results from last 7 days   Lab Units 03/25/24  0541   WBC Thousand/uL 9.81   HEMOGLOBIN g/dL 12.4   HEMATOCRIT % 40.8   PLATELETS Thousands/uL 231   NEUTROS PCT % 49   LYMPHS PCT % 37   MONOS PCT % 9   EOS PCT % 3     Results from last 7 days   Lab Units 03/25/24  0541   SODIUM mmol/L 141   POTASSIUM mmol/L 4.1   CHLORIDE mmol/L 104   CO2 mmol/L 28   BUN mg/dL 16   CREATININE mg/dL 0.97   ANION GAP mmol/L 9   CALCIUM mg/dL 9.2   ALBUMIN g/dL 3.8   TOTAL BILIRUBIN mg/dL 0.30   ALK PHOS U/L 80   ALT U/L 44   AST U/L 21   GLUCOSE RANDOM mg/dL 84             Lab Results   Component Value Date/Time    HGBA1C 5.8 (H) 01/23/2024 06:47 AM    HGBA1C 4.9 11/15/2023 06:43 AM    HGBA1C 4.7 07/13/2023 06:50 AM           EKG, Pathology, and Other Studies Reviewed on Admission:   EKG 3/11/2024 twelve-lead EKG reviewed by myself as well as interpreted by myself ventricular rate 102 QT " interval 354 QTc 461 sinus tachycardia nonspecific T wave abnormalities abnormal EKG when compared with an EKG from 3/4/2024 there are no no acute EKG changes in this EKG.    ** Please Note: This note has been constructed using a voice recognition system. **    I have spent a total time of 45 minutes on 03/30/24 in caring for this patient including Diagnostic results, Prognosis, Risks and benefits of tx options, Instructions for management, Patient and family education, Importance of tx compliance, Risk factor reductions, Impressions, Counseling / Coordination of care, Documenting in the medical record, Reviewing / ordering tests, medicine, procedures  , Obtaining or reviewing history  , and Communicating with other healthcare professionals .

## 2024-03-30 NOTE — CMS CERTIFICATION NOTE
Certification: Based upon physical, mental and social evaluations, I certify that inpatient psychiatric services continue to be medically necessary for this patient for a duration of greater than 2 midnights for the treatment of  Schizoaffective disorder, bipolar type (HCC) Available alternative community resources still do not meet the patient's mental health care needs. I further attest that an established written individualized plan of care has been updated and is outlined in the patient's medical records.    Jordan Christopher Holter, DO

## 2024-03-30 NOTE — NURSING NOTE
Patient is calm and scant. Withdrawn to room. Napped most of shift. Refused breakfast. Ate 100% of lunch. Medication compliant. No group attendance. Continuous rounding maintained.

## 2024-03-30 NOTE — TREATMENT PLAN
TREATMENT PLAN REVIEW - Behavioral Health Alberto Berumen 28 y.o. 1995 male MRN: [id number]    [facility] Room [facility] Bed: [facility] Encounter: [id number]          Admit Date/Time:  3/29/2024  8:08 PM    Treatment Team:   MD Ladan Bowie RN Roxanne Gerow-Smith, CRNP    Diagnosis: Principal Problem:    Schizoaffective disorder, bipolar type (HCC)  Active Problems:    GERD (gastroesophageal reflux disease)    Medical clearance for psychiatric admission    Tobacco abuse    T wave inversion in EKG    Chronic idiopathic constipation    Confluent and reticulate papillomatosis    Primary hypertension    Elevated hemoglobin A1c      Patient Strengths/Assets: ability for insight, average or above intelligence, capable of independent living, good support system, patient is on a voluntary commitment, patient is willing to work on problems    Patient Barriers/Limitations: lack of stable employment, poor past treatment response    Short Term Goals: decrease in depressive symptoms, decrease in psychotic symptoms, mood stabilization, increase in group attendance, increase in socialization with peers on the unit, acceptance of need for psychiatric treatment, acceptance of psychiatric medications    Long Term Goals: improvement in depression, resolution of depressive symptoms, stabilization of mood, resolution of psychotic symptoms, acceptance of need for psychiatric medications, acceptance of need for psychiatric treatment, acceptance of need for psychiatric follow up after discharge, acceptance of psychiatric diagnosis, appropriate interaction with peers, appropriate interaction with family, stable living arrangements upon discharge, establishment of family support upon discharge    Progress Towards Goals: starting psychiatric medications as prescribed, continue psychiatric medications as prescribed    Recommended Treatment: medication  management, patient medication education, group therapy, milieu therapy, continued Behavioral Health psychiatric evaluation/assessment process, ECT    Treatment Frequency: daily medication monitoring, group and milieu therapy daily, monitoring through interdisciplinary rounds, monitoring through weekly patient care conferences    Expected Discharge Date:  TBD    Discharge Plan: referrals as indicated, return to previous living arrangement    Treatment Plan Created/Updated By: Jordan Christopher Holter, DO

## 2024-03-30 NOTE — ASSESSMENT & PLAN NOTE
Patient had a telemedicine visit with dermatology where when he was on the [facility] secondary to having a rash  They recommended he be on minocycline 100 mg p.o. twice daily x 3 months he has 39 more doses of this to continue while he is in the [facility]

## 2024-03-30 NOTE — ASSESSMENT & PLAN NOTE
Patient had a cardiology consult on 1/16/2024 due to abnormal EKG changes and initiation on cholesterol  Patient has been stable and if we need to we can reconsult cardiology

## 2024-03-30 NOTE — ASSESSMENT & PLAN NOTE
Patient was transferred to the [facility] on 3/29/2024 vital signs stable afebrile patient seen and examined by myself last time he had labs was 3/25/2024 they were reviewed by myself sodium 141 potassium 4.1 creatinine 0.97 patient is scheduled to have routine labs again on 4/1/2024  Patient medically stable for admit   IM will sign off please call with any questions or concerns

## 2024-03-30 NOTE — ASSESSMENT & PLAN NOTE
Patient will continue on daily MiraLAX and daily senna  We will continue to monitor his bowel movements as needed and add agents as needed

## 2024-03-30 NOTE — ASSESSMENT & PLAN NOTE
Patient had a 30 pound weight gain when he was on the [facility] and his A1c increased to 5.8 it was in the 4 range  Patient was put on a carb controlled diet  He was started on Glucophage 500 mg p.o. daily  He will have an A1c drawn on 4/1/2024 and based off of that I will make further changes if need be

## 2024-03-30 NOTE — ASSESSMENT & PLAN NOTE
Patient will continue on his Norvasc and Inderal as ordered  Will continue to monitor his blood pressure and we will add agents or delete them as necessary

## 2024-03-30 NOTE — NURSING NOTE
"Alberto arrived on the unit at 2000. He is a 201 from . Alberto is diagnosed with schizoaffective disorder, bipolar type. Vital signs were stable upon admission. Alert and oriented X4. He was very cooperative with the admission process and is adapting well to the unit. He is med and meal compliant. Pt was provided his patient bill of rights and was given a tour of the unit. Pt will continue maintenance ECT every Wednesday. Offers no medical complaints when asked. Skin assessment shows darker coloration on his lower back and under breasts otherwise WDL. Pt admits to having AH that threaten to kill him or his family, but denies other psych symptoms. Alberto only has known allergies to Pollen. No behavioral issues. Pt was very pleasant and polite with staff and does not have a history of violence. When pt was asked why he is here he stated \"I could no longer handle my auditory hallucinations\". He stated he feels he has a support system from his aunt, who he lived with, and his sisters. Continuous safety checks maintained.   "

## 2024-03-30 NOTE — NURSING NOTE
Patient in bed at this time and appears to be sleeping. Eyes closed and chest movements noted. Patient resting comfortably with no signs of distress.  Patient did  not wake to request anything this shift.

## 2024-03-31 PROCEDURE — 99232 SBSQ HOSP IP/OBS MODERATE 35: CPT | Performed by: PSYCHIATRY & NEUROLOGY

## 2024-03-31 RX ADMIN — METFORMIN HYDROCHLORIDE 500 MG: 500 TABLET ORAL at 08:30

## 2024-03-31 RX ADMIN — CLOZAPINE 450 MG: 25 TABLET ORAL at 21:19

## 2024-03-31 RX ADMIN — ESCITALOPRAM OXALATE 20 MG: 10 TABLET, FILM COATED ORAL at 08:30

## 2024-03-31 RX ADMIN — AMLODIPINE BESYLATE 5 MG: 5 TABLET ORAL at 08:30

## 2024-03-31 RX ADMIN — MELATONIN TAB 3 MG 3 MG: 3 TAB at 21:19

## 2024-03-31 RX ADMIN — MINOCYCLINE HYDROCHLORIDE 100 MG: 100 CAPSULE ORAL at 21:19

## 2024-03-31 RX ADMIN — NICOTINE POLACRILEX 4 MG: 4 GUM, CHEWING BUCCAL at 20:08

## 2024-03-31 RX ADMIN — SENNOSIDES AND DOCUSATE SODIUM 2 TABLET: 8.6; 5 TABLET ORAL at 08:30

## 2024-03-31 RX ADMIN — PROPRANOLOL HYDROCHLORIDE 10 MG: 10 TABLET ORAL at 08:30

## 2024-03-31 RX ADMIN — NICOTINE POLACRILEX 4 MG: 4 GUM, CHEWING BUCCAL at 12:32

## 2024-03-31 RX ADMIN — ATROPINE SULFATE 1 DROP: 10 SOLUTION/ DROPS OPHTHALMIC at 21:19

## 2024-03-31 RX ADMIN — NICOTINE POLACRILEX 4 MG: 4 GUM, CHEWING BUCCAL at 09:05

## 2024-03-31 RX ADMIN — SENNOSIDES AND DOCUSATE SODIUM 2 TABLET: 8.6; 5 TABLET ORAL at 17:10

## 2024-03-31 RX ADMIN — MINOCYCLINE HYDROCHLORIDE 100 MG: 100 CAPSULE ORAL at 08:30

## 2024-03-31 RX ADMIN — PROPRANOLOL HYDROCHLORIDE 10 MG: 10 TABLET ORAL at 21:19

## 2024-03-31 RX ADMIN — NICOTINE POLACRILEX 4 MG: 4 GUM, CHEWING BUCCAL at 17:21

## 2024-03-31 RX ADMIN — POLYETHYLENE GLYCOL 3350 17 G: 17 POWDER, FOR SOLUTION ORAL at 08:30

## 2024-03-31 NOTE — NURSING NOTE
Refused weight. 226lb on 3P admission on 11/14. 30lb weight gain from previous admit to this current admission. Lungs CTA, bowel sounds present in all quadrants, and no edema observed. Patient needs podiatry consult r/t excessive long toenails.

## 2024-03-31 NOTE — PLAN OF CARE
Problem: Alteration in Thoughts and Perception  Goal: Agree to be compliant with medication regime, as prescribed and report medication side effects  Description: Interventions:  - Offer appropriate PRN medication and supervise ingestion; conduct AIMS, as needed   Outcome: Progressing     Problem: Ineffective Coping  Goal: Cooperates with admission process  Description: Interventions:   - Complete admission process  Outcome: Progressing     Problem: Alteration in Thoughts and Perception  Goal: Verbalize thoughts and feelings  Description: Interventions:  - Promote a nonjudgmental and trusting relationship with the patient through active listening and therapeutic communication  - Assess patient's level of functioning, behavior and potential for risk  - Engage patient in 1 on 1 interactions  - Encourage patient to express fears, feelings, frustrations, and discuss symptoms    - Warren patient to reality, help patient recognize reality-based thinking   - Administer medications as ordered and assess for potential side effects  - Provide the patient education related to the signs and symptoms of the illness and desired effects of prescribed medications  Outcome: Not Progressing  Goal: Attend and participate in unit activities, including therapeutic, recreational, and educational groups  Description: Interventions:  -Encourage Visitation and family involvement in care  Outcome: Not Progressing  Goal: Complete daily ADLs, including personal hygiene independently, as able  Description: Interventions:  - Observe, teach, and assist patient with ADLS  - Monitor and promote a balance of rest/activity, with adequate nutrition and elimination   Outcome: Not Progressing     Problem: Ineffective Coping  Goal: Identifies ineffective coping skills  Outcome: Not Progressing  Goal: Identifies healthy coping skills  Outcome: Not Progressing  Goal: Demonstrates healthy coping skills  Outcome: Not Progressing  Goal: Participates in unit  activities  Description: Interventions:  - Provide therapeutic environment   - Provide required programming   - Redirect inappropriate behaviors   Outcome: Not Progressing

## 2024-03-31 NOTE — NURSING NOTE
Patient is calm, pleasant, and cooperative. Withdrawn to room. Ate 100% x2. Disheveled and malodorous. Med comp. No group attendance. Reports mild anxiety and moderate depression. AH present and stating him and his family are going to be killed. Generalized paranoia present but reports feeling safe on unit. Continuous rounding maintained.

## 2024-03-31 NOTE — NURSING NOTE
Patient allowed staff to draw his blood work this evening.  Clozaril level sent down to the lab.  Patient pleasant and allowed staff to try several times and encouraged patient to drink more fluids before giving blood.  Patient cooperative and pleasant upon approach. Behaviors controlled and appropriate. Withdrawn all evening.   Warm

## 2024-03-31 NOTE — PROGRESS NOTES
Psychiatric Progress Note - Department of Behavioral Health   Alberto Berumen 28 y.o. male MRN: [id number]  Unit/Bed#: [facility] 107-01 Encounter: [id number]    ASSESSMENT & PLAN     Diagnoses:   Principal Problem:    Schizoaffective disorder, bipolar type (HCC)  Active Problems:    GERD (gastroesophageal reflux disease)    Medical clearance for psychiatric admission    Tobacco abuse    T wave inversion in EKG    Chronic idiopathic constipation    Confluent and reticulate papillomatosis    Primary hypertension    Elevated hemoglobin A1c    Treatment Recommendations/Precautions:  Continue to promote patient participation in therapeutic milieu.  Continue medical management per medicine.  Clozapine level 636.  Continue previously prescribed psychotropic medication regimen; see below.  Continue electroconvulsive therapy on 04/01 and 04/03  Continue behavioral health checks q.7 minutes.   Continue vitals per behavioral health unit protocol.  Discharge date per primary team; 201 commitment status.    SUBJECTIVE     Patient evaluated this a.m. for continuity of care. Patient was discussed at length with nursing and treatment team. Per nursing, patient remains calm, cooperative, intermittently interactive in the milieu, adherent to his medications without any acute adverse effects. No acute distress is noted throughout evaluation. Alberto Berumen denies suicidal/homicidal ideation in addition to thoughts of self-injury, receptive to crisis planning provided by this writer, contacting for safety in the inpatient setting, admitting to an ability to appropriately confide in staff including this writer.  Patient denies any/all psychiatric complaints/concerns aside from intermittent instances of auditory hallucinations, stating that he is able to redirect himself, appearing restricted in affect, agreeable to continuation of electroconvulsive therapy, stating that ECT remains therapeutically beneficial.    PSYCHIATRIC REVIEW OF  "SYSTEMS     Behavior over the last 24 hours:  unchanged  Sleep: Adequate  Appetite: Adequate  Medication side effects: No    REVIEW OF SYSTEMS   Review of systems: no complaints    OBJECTIVE     Vital Signs in Past 24 Hours:  Temp:  [97.5 °F (36.4 °C)] 97.5 °F (36.4 °C)  HR:  [] 78  Resp:  [20] 20  BP: (133-152)/(76-96) 147/76    Intake/Output in Past 24 hours:  No intake/output data recorded.  No intake/output data recorded.        Laboratory Results:  I have personally reviewed all pertinent laboratory/tests results.  Most Recent Labs:   Lab Results   Component Value Date    WBC 9.81 03/25/2024    RBC 5.17 03/25/2024    HGB 12.4 03/25/2024    HCT 40.8 03/25/2024     03/25/2024    RDW 13.0 03/25/2024    NEUTROABS 4.89 03/25/2024    SODIUM 141 03/25/2024    K 4.1 03/25/2024     03/25/2024    CO2 28 03/25/2024    BUN 16 03/25/2024    CREATININE 0.97 03/25/2024    GLUC 84 03/25/2024    GLUF 97 01/11/2024    CALCIUM 9.2 03/25/2024    AST 21 03/25/2024    ALT 44 03/25/2024    ALKPHOS 80 03/25/2024    TP 6.8 03/25/2024    ALB 3.8 03/25/2024    TBILI 0.30 03/25/2024    CHOLESTEROL 156 03/14/2024    HDL 44 03/14/2024    TRIG 133 03/14/2024    LDLCALC 85 03/14/2024    NONHDLC 112 03/14/2024    LITHIUM 0.61 01/09/2024    KUF3LXPOTXVY 1.062 11/15/2023    HGBA1C 5.8 (H) 01/23/2024     01/23/2024     Labs in last 72 hours: No results for input(s): \"WBC\", \"RBC\", \"HGB\", \"HCT\", \"PLT\", \"RDW\", \"TOTANEUTABS\", \"NEUTROABS\", \"SODIUM\", \"K\", \"CL\", \"CO2\", \"BUN\", \"CREATININE\", \"GLUC\", \"GLUF\", \"CALCIUM\", \"AST\", \"ALT\", \"ALKPHOS\", \"TP\", \"ALB\", \"TBILI\", \"CHOLESTEROL\", \"HDL\", \"TRIG\", \"LDLCALC\", \"VALPROICTOT\", \"CARBAMAZEPIN\", \"LITHIUM\", \"AMMONIA\", \"[id number]\", \"FREET4\", \"T3FREE\", \"PREGTESTUR\", \"PREGSERUM\", \"HCG\", \"HCGQUANT\", \"RPR\" in the last 72 hours.  Admission Labs:   Admission on 03/29/2024   Component Date Value    Clozapine Lvl 03/30/2024 636      Clozapine:   Lab Results   Component Value Date    CLOZAPINE 636 " 03/30/2024       Behavioral Health Medications: all current active meds have been reviewed, continue current psychiatric medications, and current meds:   Current Facility-Administered Medications   Medication Dose Route Frequency    acetaminophen (TYLENOL) tablet 650 mg  650 mg Oral Q6H PRN    acetaminophen (TYLENOL) tablet 650 mg  650 mg Oral Q4H PRN    acetaminophen (TYLENOL) tablet 975 mg  975 mg Oral Q6H PRN    aluminum-magnesium hydroxide-simethicone (MAALOX) oral suspension 30 mL  30 mL Oral Q4H PRN    amLODIPine (NORVASC) tablet 5 mg  5 mg Oral Daily    Artificial Tears ophthalmic solution 1 drop  1 drop Both Eyes Q3H PRN    atropine (ISOPTO ATROPINE) 1 % ophthalmic solution 1 drop  1 drop Sublingual HS    atropine (ISOPTO ATROPINE) 1 % ophthalmic solution 1 drop  1 drop Sublingual Daily PRN    benztropine (COGENTIN) injection 1 mg  1 mg Intramuscular Q4H PRN Max 6/day    benztropine (COGENTIN) tablet 1 mg  1 mg Oral Q4H PRN Max 6/day    benztropine (COGENTIN) tablet 1 mg  1 mg Oral Q4H PRN Max 6/day    cloZAPine (CLOZARIL) tablet 450 mg  450 mg Oral HS    hydrOXYzine HCL (ATARAX) tablet 50 mg  50 mg Oral Q6H PRN Max 4/day    Or    diphenhydrAMINE (BENADRYL) injection 50 mg  50 mg Intramuscular Q6H PRN    diphenhydrAMINE-zinc acetate (BENADRYL) 2-0.1 % cream   Topical BID PRN    escitalopram (LEXAPRO) tablet 20 mg  20 mg Oral Daily    hydrocortisone 1 % ointment   Topical 4x Daily PRN    hydrOXYzine HCL (ATARAX) tablet 100 mg  100 mg Oral Q6H PRN Max 4/day    Or    LORazepam (ATIVAN) injection 2 mg  2 mg Intramuscular Q6H PRN    hydrOXYzine HCL (ATARAX) tablet 25 mg  25 mg Oral Q6H PRN Max 4/day    ibuprofen (MOTRIN) tablet 600 mg  600 mg Oral Q8H PRN    melatonin tablet 3 mg  3 mg Oral HS    metFORMIN (GLUCOPHAGE) tablet 500 mg  500 mg Oral Daily With Breakfast    methocarbamol (ROBAXIN) tablet 500 mg  500 mg Oral Q6H PRN    minocycline (MINOCIN) capsule 100 mg  100 mg Oral Q12H KAYLIE    nicotine polacrilex  "(NICORETTE) gum 4 mg  4 mg Oral Q2H PRN    OLANZapine (ZyPREXA) tablet 5 mg  5 mg Oral Q4H PRN Max 3/day    Or    OLANZapine (ZyPREXA) IM injection 2.5 mg  2.5 mg Intramuscular Q4H PRN Max 3/day    OLANZapine (ZyPREXA) tablet 5 mg  5 mg Oral Q3H PRN Max 3/day    Or    OLANZapine (ZyPREXA) IM injection 5 mg  5 mg Intramuscular Q3H PRN Max 3/day    OLANZapine (ZyPREXA) tablet 2.5 mg  2.5 mg Oral Q4H PRN Max 6/day    ondansetron (ZOFRAN-ODT) dispersible tablet 4 mg  4 mg Oral Q6H PRN    polyethylene glycol (MIRALAX) packet 17 g  17 g Oral Daily    polyethylene glycol (MIRALAX) packet 17 g  17 g Oral Daily PRN    propranolol (INDERAL) tablet 10 mg  10 mg Oral Q12H KAYLIE    senna-docusate sodium (SENOKOT S) 8.6-50 mg per tablet 1 tablet  1 tablet Oral Daily PRN    senna-docusate sodium (SENOKOT S) 8.6-50 mg per tablet 2 tablet  2 tablet Oral BID    white petrolatum-mineral oil (EUCERIN,HYDROCERIN) cream   Topical TID PRN   .    Risks, benefits and possible side effects of Medications:   Risks, benefits, and possible side effects of medications explained to patient and patient verbalizes understanding.      Dual Antipsychotic Rationale: Not applicable    Mental Status Evaluation:  Appearance:  age appropriate, bearded, casually dressed, disheveled, and marginal grooming/hygiene, somewhat malodorous   Behavior:  Calm and cooperative although superficial, somewhat suspicious possessing intermittent eye contact   Speech:  soft and scant   Mood:  euthymic; \"fine\"   Affect:  blunted and mood-incongruent   Language sparse   Thought Process:  goal directed   Thought Content:  Overt obsessions or delusions although appears paranoid   Perceptual Disturbances: Auditory hallucinations without commands appearing internally preoccupied   Risk Potential: Suicidal Ideations none, Homicidal Ideations none, and Potential for Aggression No   Sensorium:  person, place, and time/date   Cognition:  recent and remote memory grossly intact "   Consciousness:  alert and awake    Attention: attention span appeared shorter than expected for age   Insight:  limited   Judgment: limited   Intellect Not assessed   Gait/Station: normal gait/station and normal balance   Motor Activity: no abnormal movements     Memory: Short and long term memory  fair     Progress Toward Goals: unchanged, as evidenced by their participation (or lack thereof) in individual, social and therapeutic milieu in addition to adherence to their medication regimen.    Recommended Treatment:   See above for assessment and plan.    Inpatient Psychiatric Certification: Based upon physical, mental and social evaluations, I certify that inpatient psychiatric services are medically necessary for this patient for a duration of greater than 2 midnights for the treatment of Schizoaffective disorder, bipolar type (HCC) including psychotropic medication management, participation in the therapeutic milieu and referrals as indicated. Available alternative community resources do not meet the patient's mental health care needs. I further attest that an established written individualized plan of care has been implemented and is outlined in the patient's medical records.    Counseling/Coordination of Care    Total unit time spent today was greater than 15 minutes. Greater than 50% of total time was spent with the patient and/or patient's relatives and/or coordination of patient's care.     Patient's rights, confidentiality, exceptions to confidentiality, use of electronic medical record including appropriate staff access to medical record regarding behavioral health services and consent to treatment were reviewed.    Note Share:     This note was not shared with the patient due to reasonable likelihood of causing patient harm     This note has been constructed using a voice recognition system.    There may be translation, syntax, or grammatical errors. If you have any questions, please contact the  dictating provider.    Jordan Christopher Holter, DO 03/31/24

## 2024-04-01 LAB
ALBUMIN SERPL BCG-MCNC: 4 G/DL (ref 3.5–5)
ALP SERPL-CCNC: 84 U/L (ref 34–104)
ALT SERPL W P-5'-P-CCNC: 53 U/L (ref 7–52)
ANION GAP SERPL CALCULATED.3IONS-SCNC: 9 MMOL/L (ref 4–13)
AST SERPL W P-5'-P-CCNC: 23 U/L (ref 13–39)
BASOPHILS # BLD AUTO: 0.05 THOUSANDS/ÂΜL (ref 0–0.1)
BASOPHILS NFR BLD AUTO: 1 % (ref 0–1)
BILIRUB SERPL-MCNC: 0.23 MG/DL (ref 0.2–1)
BNP SERPL-MCNC: 9 PG/ML (ref 0–100)
BUN SERPL-MCNC: 12 MG/DL (ref 5–25)
CALCIUM SERPL-MCNC: 9.3 MG/DL (ref 8.4–10.2)
CARDIAC TROPONIN I PNL SERPL HS: 4 NG/L (ref 8–18)
CHLORIDE SERPL-SCNC: 103 MMOL/L (ref 96–108)
CK SERPL-CCNC: 171 U/L (ref 39–308)
CO2 SERPL-SCNC: 27 MMOL/L (ref 21–32)
CREAT SERPL-MCNC: 0.9 MG/DL (ref 0.6–1.3)
EOSINOPHIL # BLD AUTO: 0.3 THOUSAND/ÂΜL (ref 0–0.61)
EOSINOPHIL NFR BLD AUTO: 3 % (ref 0–6)
ERYTHROCYTE [DISTWIDTH] IN BLOOD BY AUTOMATED COUNT: 13.5 % (ref 11.6–15.1)
EST. AVERAGE GLUCOSE BLD GHB EST-MCNC: 97 MG/DL
FOLATE SERPL-MCNC: 14.2 NG/ML
GFR SERPL CREATININE-BSD FRML MDRD: 116 ML/MIN/1.73SQ M
GLUCOSE SERPL-MCNC: 120 MG/DL (ref 65–140)
HBA1C MFR BLD: 5 %
HCT VFR BLD AUTO: 41.8 % (ref 36.5–49.3)
HGB BLD-MCNC: 12.9 G/DL (ref 12–17)
IMM GRANULOCYTES # BLD AUTO: 0.06 THOUSAND/UL (ref 0–0.2)
IMM GRANULOCYTES NFR BLD AUTO: 1 % (ref 0–2)
LYMPHOCYTES # BLD AUTO: 2.58 THOUSANDS/ÂΜL (ref 0.6–4.47)
LYMPHOCYTES NFR BLD AUTO: 27 % (ref 14–44)
MCH RBC QN AUTO: 24.1 PG (ref 26.8–34.3)
MCHC RBC AUTO-ENTMCNC: 30.9 G/DL (ref 31.4–37.4)
MCV RBC AUTO: 78 FL (ref 82–98)
MONOCYTES # BLD AUTO: 0.81 THOUSAND/ÂΜL (ref 0.17–1.22)
MONOCYTES NFR BLD AUTO: 9 % (ref 4–12)
NEUTROPHILS # BLD AUTO: 5.65 THOUSANDS/ÂΜL (ref 1.85–7.62)
NEUTS SEG NFR BLD AUTO: 59 % (ref 43–75)
NRBC BLD AUTO-RTO: 0 /100 WBCS
PLATELET # BLD AUTO: 237 THOUSANDS/UL (ref 149–390)
PMV BLD AUTO: 11.5 FL (ref 8.9–12.7)
POTASSIUM SERPL-SCNC: 4.1 MMOL/L (ref 3.5–5.3)
PROT SERPL-MCNC: 7.2 G/DL (ref 6.4–8.4)
RBC # BLD AUTO: 5.36 MILLION/UL (ref 3.88–5.62)
SODIUM SERPL-SCNC: 139 MMOL/L (ref 135–147)
VIT B12 SERPL-MCNC: 268 PG/ML (ref 180–914)
WBC # BLD AUTO: 9.45 THOUSAND/UL (ref 4.31–10.16)

## 2024-04-01 PROCEDURE — 84484 ASSAY OF TROPONIN QUANT: CPT

## 2024-04-01 PROCEDURE — 85025 COMPLETE CBC W/AUTO DIFF WBC: CPT

## 2024-04-01 PROCEDURE — 99232 SBSQ HOSP IP/OBS MODERATE 35: CPT | Performed by: PSYCHIATRY & NEUROLOGY

## 2024-04-01 PROCEDURE — 83036 HEMOGLOBIN GLYCOSYLATED A1C: CPT | Performed by: NURSE PRACTITIONER

## 2024-04-01 PROCEDURE — 82746 ASSAY OF FOLIC ACID SERUM: CPT | Performed by: NURSE PRACTITIONER

## 2024-04-01 PROCEDURE — 82550 ASSAY OF CK (CPK): CPT

## 2024-04-01 PROCEDURE — 82607 VITAMIN B-12: CPT | Performed by: NURSE PRACTITIONER

## 2024-04-01 PROCEDURE — 83880 ASSAY OF NATRIURETIC PEPTIDE: CPT

## 2024-04-01 PROCEDURE — 80053 COMPREHEN METABOLIC PANEL: CPT

## 2024-04-01 PROCEDURE — 82306 VITAMIN D 25 HYDROXY: CPT | Performed by: NURSE PRACTITIONER

## 2024-04-01 RX ADMIN — NICOTINE POLACRILEX 4 MG: 4 GUM, CHEWING BUCCAL at 08:34

## 2024-04-01 RX ADMIN — ESCITALOPRAM OXALATE 20 MG: 10 TABLET, FILM COATED ORAL at 08:32

## 2024-04-01 RX ADMIN — AMLODIPINE BESYLATE 5 MG: 5 TABLET ORAL at 08:32

## 2024-04-01 RX ADMIN — POLYETHYLENE GLYCOL 3350 17 G: 17 POWDER, FOR SOLUTION ORAL at 08:31

## 2024-04-01 RX ADMIN — SENNOSIDES AND DOCUSATE SODIUM 2 TABLET: 8.6; 5 TABLET ORAL at 08:32

## 2024-04-01 RX ADMIN — CLOZAPINE 450 MG: 25 TABLET ORAL at 21:10

## 2024-04-01 RX ADMIN — NICOTINE POLACRILEX 4 MG: 4 GUM, CHEWING BUCCAL at 12:29

## 2024-04-01 RX ADMIN — PROPRANOLOL HYDROCHLORIDE 10 MG: 10 TABLET ORAL at 21:10

## 2024-04-01 RX ADMIN — MINOCYCLINE HYDROCHLORIDE 100 MG: 100 CAPSULE ORAL at 08:32

## 2024-04-01 RX ADMIN — NICOTINE POLACRILEX 2 MG: 2 GUM, CHEWING BUCCAL at 15:25

## 2024-04-01 RX ADMIN — NICOTINE POLACRILEX 2 MG: 2 GUM, CHEWING BUCCAL at 21:43

## 2024-04-01 RX ADMIN — MELATONIN TAB 3 MG 3 MG: 3 TAB at 21:10

## 2024-04-01 RX ADMIN — SENNOSIDES AND DOCUSATE SODIUM 2 TABLET: 8.6; 5 TABLET ORAL at 17:13

## 2024-04-01 RX ADMIN — NICOTINE POLACRILEX 2 MG: 2 GUM, CHEWING BUCCAL at 18:12

## 2024-04-01 RX ADMIN — METFORMIN HYDROCHLORIDE 500 MG: 500 TABLET ORAL at 08:32

## 2024-04-01 RX ADMIN — PROPRANOLOL HYDROCHLORIDE 10 MG: 10 TABLET ORAL at 08:32

## 2024-04-01 RX ADMIN — MINOCYCLINE HYDROCHLORIDE 100 MG: 100 CAPSULE ORAL at 21:10

## 2024-04-01 RX ADMIN — ATROPINE SULFATE 1 DROP: 10 SOLUTION/ DROPS OPHTHALMIC at 21:10

## 2024-04-01 NOTE — PROGRESS NOTES
Psychiatry Progress Note [facility]    Alberto Berumen 28 y.o. male MRN: [id number]  Unit/Bed#: [facility] 107-01 Encounter: [id number]  Code Status: Level 1 - Full Code    PCP: Joce Juan MD    Date of Admission:  3/29/2024 2008   Date of Service:  04/01/24    Patient Active Problem List   Diagnosis    GERD (gastroesophageal reflux disease)    Medical clearance for psychiatric admission    Schizoaffective disorder, bipolar type (HCC)    Tobacco abuse    T wave inversion in EKG    Syringoma    Chronic idiopathic constipation    Vitamin B 12 deficiency    Vitamin D deficiency    Confluent and reticulate papillomatosis    Class 2 obesity in adult    Primary hypertension    Elevated hemoglobin A1c         Review of systems: Unremarkable, needs podiatry consult  Diagnosis: Schizoaffective bipolar type     Assessment  Overall Status: Somewhat disheveled unkempt and malodorous and still hearing voices that tell him that he and his family are going to be killed and appears paranoid, did nt get ECT he was supposed to this am , pleasant and polite, aunt and sisters are supportive   Certification Statement: The patient will continue to require additional inpatient hospital stay due to ongoing voices that are threatening to kill him and his family lack of response to medications and ECT so far       Medications: Clozapine 450 mg at bedtime, propranolol 10 mg every 12 hours as as needed for anxiety, Lexapro 20 mg once a day, atropine eyedrops sublingual for drooling of saliva and senna 2 tablets twice a day for constipation  Side effects from treatment: None reported  Medication changes   None at this time but will need to titrate Clozapine depending on blood work   Medication education   Risks side effects benefits and precautions of medications discussed with patient and he did verbalize an understanding about risks for metabolic syndrome from being on neuroleptics and is form tardive dyskinesia etc. and  special precautions about being on clozapine   Understanding of medications: Has some understanding   Justification for dual anti-psychotics: Not applicable    Non-pharmacological treatments  Continue with individual, group, milieu and occupational therapy using recovery principles and psycho-education about accepting illness and the need for treatment.   to contact aunt and explore ACT team referral which he never had    Safety  Safety and communication plan established to target dynamic risk factors discussed above.    Discharge Plan   Back to his aunt with an ACT team    Interval Progress   Chart reviewed and patient interviewed.  Patient was reportedly following up with outpatient mental health treatment and claims to have been struggling with threatening voices and paranoia for more than 2 years.  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  His mother is in a nursing home with a stroke and father  4 or 5 years ago in his sleep.  He has 3 sisters and 2 brothers who live in the [place] area.  He himself was born in the [place] and raised in [place] [facility] [facility] [facility] since he was 8 years old.  He has a high school diploma.  He has been on [email or web address] benefits for more than 2 years.  Still reports hearing voices that threaten him that they are going to kill him and his family which is annoying and he believes the ECT and clozapine has helped to some extent but not to a great extent as he still hears them and he is willing to continue with the ECT is to see if that would help.  He denies having ever had any suicidal thoughts intent or plans.  Acceptance by patient: Accepting  Hopefulness in recovery: Living with his aunt again  Involved in reintegration process: Talking with his aunt  Trusting in relationship with psychiatrist: Trusting  Sleep: Good  Appetite: Good with gain of over 35 pounds since his admission  Compliance with Medications: Compliant  Group  attendance: 2/9 agrees to increase group attendance  Significant events: Adjusting to the unit      Mental Status Exam  Appearance: age appropriate, improved grooming, looks older than stated age, overweight, with hair and dreadlocks casually dressed fairly groomed with good eye contact  Behavior: cooperative, mildly anxious, evasive, gesturing, slow responses  Speech: normal rate, normal volume, normal pitch  Mood: dysphoric, anxious, reports he feels good  Affect: constricted, inappropriate, mood-congruent  Thought Process: organized, logical, coherent, goal directed, linear, decreased rate of thoughts, slowing of thoughts, negative thinking, impaired abstract reasoning, concrete  Thought Content: paranoid ideation, some paranoia, intrusive thoughts, preoccupied, chronic  Perceptual Disturbances: auditory hallucinations, appears preoccupied, appears responding to internal stimuli, not observed, reports voices tell him that he and his family are going to be killed sometimes from 1 person on more than one person talking to him.  No current suicidal or homicidal thoughts and no plans verbalized.  No phobias obsessions compulsions or distorted body perceptions reported.  Hx Risk Factors: chronic psychiatric problems, chronic anxiety symptoms, chronic psychotic symptoms,    Sensorium: alert and oriented to person, place, time and situation  Cognition: recent and remote memory grossly intact  Consciousness: alert and awake  Attention: attention span and concentration are age appropriate  Intellect: appears to be of average intelligence  Insight: intact  Judgement: intact  Motor Activity: no abnormal movements     Vitals  Temp:  [97.6 °F (36.4 °C)-97.8 °F (36.6 °C)] 97.6 °F (36.4 °C)  HR:  [] 104  Resp:  [18] 18  BP: (112-150)/(66-90) 136/80  SpO2:  [98 %-100 %] 100 %  No intake or output data in the 24 hours ending 04/01/24 0753    Lab Results: All Labs For Current Hospital Admission Reviewed      Current  Facility-Administered Medications   Medication Dose Route Frequency Provider Last Rate    acetaminophen  650 mg Oral Q6H PRN [facility] Holter, DO      acetaminophen  650 mg Oral Q4H PRN [facility] Holter, DO      acetaminophen  975 mg Oral Q6H PRN [facility] Holter, DO      aluminum-magnesium hydroxide-simethicone  30 mL Oral Q4H PRN [facility] Holter, DO      amLODIPine  5 mg Oral Daily HOLLI Lion      Artificial Tears  1 drop Both Eyes Q3H PRN [facility] Holter, DO      atropine  1 drop Sublingual HS HOLLI Lion      atropine  1 drop Sublingual Daily PRN HOLLI Lion      benztropine  1 mg Intramuscular Q4H PRN Max 6/day [facility] Holter, DO      benztropine  1 mg Oral Q4H PRN Max 6/day HOLLI Lion      benztropine  1 mg Oral Q4H PRN Max 6/day [facility] Holter, DO      cloZAPine  450 mg Oral HS HOLLI Lion      hydrOXYzine HCL  50 mg Oral Q6H PRN Max 4/day [facility] Holter, DO      Or    diphenhydrAMINE  50 mg Intramuscular Q6H PRN [facility] Holter, DO      diphenhydrAMINE-zinc acetate   Topical BID PRN HOLLI Lion      escitalopram  20 mg Oral Daily HOLLI Lion      hydrocortisone   Topical 4x Daily PRN HOLLI Lion      hydrOXYzine HCL  100 mg Oral Q6H PRN Max 4/day [facility] Holter, DO      Or    LORazepam  2 mg Intramuscular Q6H PRN [facility] Holter, DO      hydrOXYzine HCL  25 mg Oral Q6H PRN Max 4/day [facility] Holter, DO      ibuprofen  600 mg Oral Q8H PRN HOLLI Lion      melatonin  3 mg Oral HS [facility] Holter, DO      metFORMIN  500 mg Oral Daily With Breakfast HOLLI Lion      methocarbamol  500 mg Oral Q6H PRN HOLLI Lion      minocycline  100 mg Oral Q12H KAYLIE HOLLI Lion      nicotine polacrilex  4 mg Oral Q2H PRN HOLLI Lion      OLANZapine  5 mg Oral Q4H PRN Max 3/day [facility] Holter, DO      Or    OLANZapine  2.5 mg Intramuscular Q4H PRN Max 3/day [facility] Holter, DO      OLANZapine  5 mg Oral Q3H PRN Max 3/day Ion FISCHER  Holter, DO      Or    OLANZapine  5 mg Intramuscular Q3H PRN Max 3/day Ion Dupontter, DO      OLANZapine  2.5 mg Oral Q4H PRN Max 6/day Ion Dupontter, DO      ondansetron  4 mg Oral Q6H PRN HOLLI Lion      polyethylene glycol  17 g Oral Daily HOLLI Lion      polyethylene glycol  17 g Oral Daily PRN Ion Dupontter, DO      propranolol  10 mg Oral Q12H KAYLIE HOLLI Lion      senna-docusate sodium  1 tablet Oral Daily PRN Jordan C Holter, DO      senna-docusate sodium  2 tablet Oral BID HOLLI Lion      white petrolatum-mineral oil   Topical TID PRN HOLLI Lion         Counseling / Coordination of Care: Total floor / unit time spent today 15 minutes. Greater than 50% of total time was spent with the patient and / or family counseling and / or somewhat receptive to supportive listening and teaching positive coping skills to deal with symptom mangement.     Patient's Rights, confidentiality and exceptions to confidentiality, use of automated medical record, Behavioral Health Services staff access to medical record, and consent to treatment reviewed.    This note has been dictated and hence there may be problems with punctuation, spelling and formatting and if anyone has any concerns please address them to Dr. Rosario   This note is not shared with patient due to potential for making patient's condition worse by knowing the content of the note.

## 2024-04-01 NOTE — PROGRESS NOTES
04/01/24 0922   Team Meeting   Meeting Type Tx Team Meeting   Initial Conference Date 04/01/24   Next Conference Date 05/01/24   Team Members Present   Team Members Present Physician;Nurse;   Physician Team Member Abraham   Nursing Team Member Claudia   Social Work Team Member Jenny ORTEGA   Patient/Family Present   Patient Present Yes     Patient reviewed and signed initial treatment plan. Pt was cooperative and expressed that he had no questions. Pt declined wanting a copy of his plan so copy was placed in pt's file.

## 2024-04-01 NOTE — PLAN OF CARE
Problem: Alteration in Thoughts and Perception  Goal: Treatment Goal: Gain control of psychotic behaviors/thinking, reduce/eliminate presenting symptoms and demonstrate improved reality functioning upon discharge  Outcome: Progressing  Goal: Verbalize thoughts and feelings  Description: Interventions:  - Promote a nonjudgmental and trusting relationship with the patient through active listening and therapeutic communication  - Assess patient's level of functioning, behavior and potential for risk  - Engage patient in 1 on 1 interactions  - Encourage patient to express fears, feelings, frustrations, and discuss symptoms    - [place] patient to reality, help patient recognize reality-based thinking   - Administer medications as ordered and assess for potential side effects  - Provide the patient education related to the signs and symptoms of the illness and desired effects of prescribed medications  Outcome: Progressing  Goal: Refrain from acting on delusional thinking/internal stimuli  Description: Interventions:  - Monitor patient closely, per order   - Utilize least restrictive measures   - Set reasonable limits, give positive feedback for acceptable   - Administer medications as ordered and monitor of potential side effects  Outcome: Progressing  Goal: Agree to be compliant with medication regime, as prescribed and report medication side effects  Description: Interventions:  - Offer appropriate PRN medication and supervise ingestion; conduct AIMS, as needed   Outcome: Progressing  Goal: Attend and participate in unit activities, including therapeutic, recreational, and educational groups  Description: Interventions:  -Encourage Visitation and family involvement in care  Outcome: Progressing  Goal: Recognize dysfunctional thoughts, communicate reality-based thoughts at the time of discharge  Description: Interventions:  - Provide medication and psycho-education to assist patient in compliance and developing  insight into his/her illness   Outcome: Progressing  Goal: Complete daily ADLs, including personal hygiene independently, as able  Description: Interventions:  - Observe, teach, and assist patient with ADLS  - Monitor and promote a balance of rest/activity, with adequate nutrition and elimination   Outcome: Progressing     Problem: Ineffective Coping  Goal: Cooperates with admission process  Description: Interventions:   - Complete admission process  Outcome: Progressing  Goal: Identifies ineffective coping skills  Outcome: Progressing  Goal: Identifies healthy coping skills  Outcome: Progressing  Goal: Demonstrates healthy coping skills  Outcome: Progressing  Goal: Participates in unit activities  Description: Interventions:  - Provide therapeutic environment   - Provide required programming   - Redirect inappropriate behaviors   Outcome: Progressing  Goal: Patient/Family participate in treatment and DC plans  Description: Interventions:  - Provide therapeutic environment  Outcome: Progressing  Goal: Patient/Family verbalizes awareness of resources  Outcome: Progressing     Problem: Depression  Goal: Treatment Goal: Demonstrate behavioral control of depressive symptoms, verbalize feelings of improved mood/affect, and adopt new coping skills prior to discharge  Outcome: Progressing  Goal: Verbalize thoughts and feelings  Description: Interventions:  - Assess and re-assess patient's level of risk   - Engage patient in 1:1 interactions, daily, for a minimum of 15 minutes   - Encourage patient to express feelings, fears, frustrations, hopes   Outcome: Progressing  Goal: Refrain from harming self  Description: Interventions:  - Monitor patient closely, per order   - Supervise medication ingestion, monitor effects and side effects   Outcome: Progressing  Goal: Refrain from isolation  Description: Interventions:  - Develop a trusting relationship   - Encourage socialization   Outcome: Progressing  Goal: Refrain from  self-neglect  Outcome: Progressing  Goal: Attend and participate in unit activities, including therapeutic, recreational, and educational groups  Description: Interventions:  - Provide therapeutic and educational activities daily, encourage attendance and participation, and document same in the medical record   Outcome: Progressing  Goal: Complete daily ADLs, including personal hygiene independently, as able  Description: Interventions:  - Observe, teach, and assist patient with ADLS  -  Monitor and promote a balance of rest/activity, with adequate nutrition and elimination   Outcome: Progressing     Problem: Anxiety  Goal: Anxiety is at manageable level  Description: Interventions:  - Assess and monitor patient's anxiety level.   - Monitor for signs and symptoms (heart palpitations, chest pain, shortness of breath, headaches, nausea, feeling jumpy, restlessness, irritable, apprehensive).   - Collaborate with interdisciplinary team and initiate plan and interventions as ordered.  - [place] patient to unit/surroundings  - Explain treatment plan  - Encourage participation in care  - Encourage verbalization of concerns/fears  - Identify coping mechanisms  - Assist in developing anxiety-reducing skills  - Administer/offer alternative therapies  - Limit or eliminate stimulants  Outcome: Progressing     Problem: Alteration in Orientation  Goal: Treatment Goal: Demonstrate a reduction of confusion and improved orientation to person, place, time and/or situation upon discharge, according to optimum baseline/ability  Outcome: Progressing  Goal: Interact with staff daily  Description: Interventions:  - Assess and re-assess patient's level of orientation  - Engage patient in 1 on 1 interactions, daily, for a minimum of 15 minutes   - Establish rapport/trust with patient   Outcome: Progressing  Goal: Express concerns related to confused thinking related to:  Description: Interventions:  - Encourage patient to express feelings,  fears, frustrations, hopes  - Assign consistent caregivers   - [place]/re-orient patient as needed  - Allow comfort items, as appropriate  - Provide visual cues, signs, etc.   Outcome: Progressing  Goal: Allow medical examinations, as recommended  Description: Interventions:  - Provide physical/neurological exams and/or referrals, per provider   Outcome: Progressing  Goal: Cooperate with recommended testing/procedures  Description: Interventions:  - Determine need for ancillary testing  - Observe for mental status changes  - Implement falls/precaution protocol   Outcome: Progressing  Goal: Attend and participate in unit activities, including therapeutic, recreational, and educational groups  Description: Interventions:  - Provide therapeutic and educational activities daily, encourage attendance and participation, and document same in the medical record   - Provide appropriate opportunities for reminiscence   - Provide a consistent daily routine   - Encourage family contact/visitation   Outcome: Progressing  Goal: Complete daily ADLs, including personal hygiene independently, as able  Description: Interventions:  - Observe, teach, and assist patient with ADLS  Outcome: Progressing     Problem: Individualized Interventions  Goal: Patient will verbalize appropriate use of telephone within 5 days  Description: Interventions:  - Treatment team to determine use of supervised phone privileges   Outcome: Progressing  Goal: Patient will verbalize need for hospitalization and will no longer attempt elopement within 5 days  Description: Interventions:  - Ongoing education to help patient understand need for hospitalization  Outcome: Progressing  Goal: Patient will recognize inappropriate behaviors and develop alternative behaviors within 5 days  Description: Interventions:  - Patient in collaboration with Treatment Team will develop a behavior management plan to help identify effective coping skills to deal with  stressors  Outcome: Progressing     Problem: Electroconvulsive therapy (ECT)  Goal: Treatment Goal: Demonstrate a reduction of confusion and improved orientation to person, place, time and/or situation upon discharge, according to optimum baseline/ability  Outcome: Progressing  Goal: Verbalizes/displays adequate comfort level or baseline comfort level  Description: Interventions:  - Encourage patient to monitor pain and request assistance  - Assess pain using appropriate pain scale  - Administer analgesics based on type and severity of pain and evaluate response  - Implement non-pharmacological measures as appropriate and evaluate response  - Consider cultural and social influences on pain and pain management  - Notify physician/advanced practitioner if interventions unsuccessful or patient reports new pain  Outcome: Progressing  Goal: Achieves stable or improved neurological status  Description: INTERVENTIONS  - Monitor and report changes in neurological status  - Monitor vital signs such as temperature, blood pressure, glucose, and any other labs ordered   - Initiate measures to prevent increased intracranial pressure  - Monitor for seizure activity and implement precautions if appropriate      Outcome: Progressing  Goal: Achieves maximal functionality and self care  Description: INTERVENTIONS  - Monitor swallowing and airway patency with patient fatigue and changes in neurological status  - Encourage and assist patient to increase activity and self care.   - Encourage visually impaired, hearing impaired and aphasic patients to use assistive/communication devices  Outcome: Progressing  Goal: Maintain or return mobility to safest level of function  Description: INTERVENTIONS:  - Assess patient's ability to carry out ADLs; assess patient's baseline for ADL function and identify physical deficits which impact ability to perform ADLs (bathing, care of mouth/teeth, toileting, grooming, dressing, etc.)  -  Assess/evaluate cause of self-care deficits   - Assess range of motion  - Assess patient's mobility  - Assess patient's need for assistive devices and provide as appropriate  - Encourage maximum independence but intervene and supervise when necessary  - Involve family in performance of ADLs  - Assess for home care needs following discharge   - Consider OT consult to assist with ADL evaluation and planning for discharge  - Provide patient education as appropriate  Outcome: Progressing  Goal: Absence of urinary retention  Description: INTERVENTIONS:  - Assess patient’s ability to void and empty bladder  - Monitor I/O  - Bladder scan as needed  - Discuss with physician/AP medications to alleviate retention as needed  - Discuss catheterization for long term situations as appropriate  Outcome: Progressing  Goal: Minimal or absence of nausea and/or vomiting  Description: INTERVENTIONS:  - Administer IV fluids if ordered to ensure adequate hydration  - Maintain NPO status until nausea and vomiting are resolved  - Nasogastric tube if ordered  - Administer ordered antiemetic medications as needed  - Provide nonpharmacologic comfort measures as appropriate  - Advance diet as tolerated, if ordered  - Consider nutrition services referral to assist patient with adequate nutrition and appropriate food choices  Outcome: Progressing  Goal: Maintains adequate nutritional intake  Description: INTERVENTIONS:  - Monitor percentage of each meal consumed  - Identify factors contributing to decreased intake, treat as appropriate  - Assist with meals as needed  - Monitor I&O, weight, and lab values if indicated  - Obtain nutrition services referral as needed  Outcome: Progressing

## 2024-04-01 NOTE — NURSING NOTE
Attempted x 2 for labs this morning was unsuccessful.  Alberto is encourage to stay hydrate and not eat due to several lab been fasting.

## 2024-04-01 NOTE — SOCIAL WORK
SW and pt met to complete biopsychosocial assessment and ROIs. Pt was in bed prior to the meeting so he was groggy but cooperative and pleasant. Pt denied any current concerns. SW explained role. Pt inquired about visits with family. SW will work on obtaining email addresses to set up virtual visit for pt with family.

## 2024-04-01 NOTE — NURSING NOTE
Patient remains withdrawn, pleasant, and cooperative. Denies any needs or concerns. No behavioral issues.

## 2024-04-01 NOTE — NURSING NOTE
Alberto reported that he was having auditory hallucinations.  The voices are telling him that they are going to kill him.  This nurse reminded pt that he is on a safe and secured unit.  Also reminded him that staff is checking on all patients every 7 minutes. Pt requested nicotine gum throughout the shift.
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
## 2024-04-01 NOTE — PROGRESS NOTES
04/01/24 0744   Team Meeting   Meeting Type Daily Rounds   Team Members Present   Team Members Present Physician;Nurse;;Other (Discipline and Name)   Patient/Family Present   Patient Present No   Patient's Family Present No     In attendance:  Dr. Alex Thomas, MD Dr. Jordan Holter, HOLLI Weiss, RN  Luisana Alvarado, [facility]  MATILDA Daniel.S.    Groups: 2/9    Pt admitted to the unit late Friday evening on a 201. Pt is cooperative, medication compliant and will be continuing ECT treatments. Patient admits to auditory hallucinations that tell him he and his family will be killed but are not commanding. Pt denies any other symptoms. Patient is polite with no bx issues. Pt needs a podiatry consult for long toenails.

## 2024-04-01 NOTE — MALNUTRITION/BMI
This medical record reflects one or more clinical indicators suggestive of malnutrition and/or morbid obesity.                                    BMI Findings:  Adult BMI Classifications: Morbid Obesity 40-44.9        Body mass index is 41.32 kg/m².     See Nutrition note dated 4/1/24 for additional details.  Completed nutrition assessment is viewable in the nutrition documentation.

## 2024-04-01 NOTE — NURSING NOTE
Called PACU to verify maintenance ECT on Wednesday . Person on receiving end confirmed and agreed. 3P also called earlier yesterday and spoke with staff from  who also confirmed patient was Wed. ECT Maintenance. Doctor performing ECT was on the phone in PACU and did not know what the end decision was, therefore patient did not go until orders are written and verified.

## 2024-04-01 NOTE — NURSING NOTE
Alberto maintain on ongoing SAFE precaution without incident on this shift. Observed resting in bed with eyes closed, respiration even and unlabored.   Continuous Q 7 minutes check implemented thru the night. Scheduled lads: BNP, CBC, CK, CMP, Folate, HgA1C, Troponin, Vitamin B12, and Vitamin D25 to be obtain this morning.  No behavioral noted.

## 2024-04-01 NOTE — NURSING NOTE
Patient is calm, isolative, and cooperative. Withdrawn to bedroom. Ate 100% of breakfast and lunch. Med comp. Encouraging fluid intake r/t needing labs. Patient reported brief episode of generalized anxiety that has now resolved. Depression and AH present. Patient states he hears voices of his friends and family at times but today they are of an unknown man and woman. AH telling him that he and his family will die. Continuous rounding maintained.

## 2024-04-02 LAB — 25(OH)D3 SERPL-MCNC: 12.3 NG/ML (ref 30–100)

## 2024-04-02 PROCEDURE — 99232 SBSQ HOSP IP/OBS MODERATE 35: CPT | Performed by: PSYCHIATRY & NEUROLOGY

## 2024-04-02 PROCEDURE — 0HBRXZZ EXCISION OF TOE NAIL, EXTERNAL APPROACH: ICD-10-PCS | Performed by: STUDENT IN AN ORGANIZED HEALTH CARE EDUCATION/TRAINING PROGRAM

## 2024-04-02 PROCEDURE — 0HBRXZZ EXCISION OF TOE NAIL, EXTERNAL APPROACH: ICD-10-PCS | Performed by: PSYCHIATRY & NEUROLOGY

## 2024-04-02 RX ORDER — CYANOCOBALAMIN 1000 UG/ML
1000 INJECTION, SOLUTION INTRAMUSCULAR; SUBCUTANEOUS ONCE
Qty: 1 ML | Refills: 0 | Status: COMPLETED | OUTPATIENT
Start: 2024-04-02 | End: 2024-04-02

## 2024-04-02 RX ORDER — ERGOCALCIFEROL 1.25 MG/1
50000 CAPSULE, LIQUID FILLED ORAL WEEKLY
Status: COMPLETED | OUTPATIENT
Start: 2024-04-02 | End: 2024-05-21

## 2024-04-02 RX ADMIN — MINOCYCLINE HYDROCHLORIDE 100 MG: 100 CAPSULE ORAL at 08:39

## 2024-04-02 RX ADMIN — POLYETHYLENE GLYCOL 3350 17 G: 17 POWDER, FOR SOLUTION ORAL at 08:52

## 2024-04-02 RX ADMIN — MINOCYCLINE HYDROCHLORIDE 100 MG: 100 CAPSULE ORAL at 21:16

## 2024-04-02 RX ADMIN — MELATONIN TAB 3 MG 3 MG: 3 TAB at 21:16

## 2024-04-02 RX ADMIN — SENNOSIDES AND DOCUSATE SODIUM 2 TABLET: 8.6; 5 TABLET ORAL at 17:28

## 2024-04-02 RX ADMIN — PROPRANOLOL HYDROCHLORIDE 10 MG: 10 TABLET ORAL at 08:48

## 2024-04-02 RX ADMIN — PROPRANOLOL HYDROCHLORIDE 10 MG: 10 TABLET ORAL at 21:17

## 2024-04-02 RX ADMIN — CYANOCOBALAMIN 1000 MCG: 1000 INJECTION, SOLUTION INTRAMUSCULAR; SUBCUTANEOUS at 10:30

## 2024-04-02 RX ADMIN — ESCITALOPRAM OXALATE 20 MG: 10 TABLET, FILM COATED ORAL at 08:39

## 2024-04-02 RX ADMIN — SENNOSIDES AND DOCUSATE SODIUM 2 TABLET: 8.6; 5 TABLET ORAL at 08:39

## 2024-04-02 RX ADMIN — ERGOCALCIFEROL 50000 UNITS: 1.25 CAPSULE, LIQUID FILLED ORAL at 12:18

## 2024-04-02 RX ADMIN — CLOZAPINE 450 MG: 25 TABLET ORAL at 21:16

## 2024-04-02 RX ADMIN — ATROPINE SULFATE 1 DROP: 10 SOLUTION/ DROPS OPHTHALMIC at 21:24

## 2024-04-02 RX ADMIN — NICOTINE POLACRILEX 2 MG: 2 GUM, CHEWING BUCCAL at 15:25

## 2024-04-02 RX ADMIN — NICOTINE POLACRILEX 2 MG: 2 GUM, CHEWING BUCCAL at 17:38

## 2024-04-02 RX ADMIN — NICOTINE POLACRILEX 2 MG: 2 GUM, CHEWING BUCCAL at 21:24

## 2024-04-02 RX ADMIN — AMLODIPINE BESYLATE 5 MG: 5 TABLET ORAL at 08:48

## 2024-04-02 RX ADMIN — METFORMIN HYDROCHLORIDE 500 MG: 500 TABLET ORAL at 08:40

## 2024-04-02 NOTE — NURSING NOTE
Patient slept through breakfast this am. Patient had lunch and dinner. Patient stated this morning he was tire. He denied any sleeping issues during the night. Patient was compliant with medications. He states he has auditory hallucinations of voices. Patient denies HI/SI.

## 2024-04-02 NOTE — PROGRESS NOTES
Alberto completed his Behavioral Health Admission Self-Assement & Inpatient Behavioral Health Safety/Relapse Prevention Plan with the following results:    Stressor: not being able to be outside    Things I enjoy & are coping skills: Reading, music, singing, sports, art and craft, exercise, computer, deep breathing, journaling,talking with supports, taking a walk, helping others, spirituality, volunteering, mindfulness, hot showers, support groups, keeping my structures.    Thing to work on the hospital: Self-esteem, life skills, relaxation techniques, healthy lifestyles    My strengths and supports: my family and friends    Triggers: things people say to me and do, losses, my own negative thoughts, being alone, loneliness, preoccupation with the past, perceived failure, financial problems    Warning Signs: being by myself

## 2024-04-02 NOTE — PLAN OF CARE
Problem: Alteration in Thoughts and Perception  Goal: Verbalize thoughts and feelings  Description: Interventions:  - Promote a nonjudgmental and trusting relationship with the patient through active listening and therapeutic communication  - Assess patient's level of functioning, behavior and potential for risk  - Engage patient in 1 on 1 interactions  - Encourage patient to express fears, feelings, frustrations, and discuss symptoms    - [place] patient to reality, help patient recognize reality-based thinking   - Administer medications as ordered and assess for potential side effects  - Provide the patient education related to the signs and symptoms of the illness and desired effects of prescribed medications  Outcome: Progressing  Goal: Refrain from acting on delusional thinking/internal stimuli  Description: Interventions:  - Monitor patient closely, per order   - Utilize least restrictive measures   - Set reasonable limits, give positive feedback for acceptable   - Administer medications as ordered and monitor of potential side effects  Outcome: Progressing  Goal: Agree to be compliant with medication regime, as prescribed and report medication side effects  Description: Interventions:  - Offer appropriate PRN medication and supervise ingestion; conduct AIMS, as needed   Outcome: Progressing  Goal: Attend and participate in unit activities, including therapeutic, recreational, and educational groups  Description: Interventions:  -Encourage Visitation and family involvement in care  Outcome: Progressing  Goal: Complete daily ADLs, including personal hygiene independently, as able  Description: Interventions:  - Observe, teach, and assist patient with ADLS  - Monitor and promote a balance of rest/activity, with adequate nutrition and elimination   Outcome: Progressing     Problem: Depression  Goal: Refrain from self-neglect  Outcome: Progressing  Goal: Attend and participate in unit activities, including  therapeutic, recreational, and educational groups  Description: Interventions:  - Provide therapeutic and educational activities daily, encourage attendance and participation, and document same in the medical record   Outcome: Progressing     Problem: Anxiety  Goal: Anxiety is at manageable level  Description: Interventions:  - Assess and monitor patient's anxiety level.   - Monitor for signs and symptoms (heart palpitations, chest pain, shortness of breath, headaches, nausea, feeling jumpy, restlessness, irritable, apprehensive).   - Collaborate with interdisciplinary team and initiate plan and interventions as ordered.  - [place] patient to unit/surroundings  - Explain treatment plan  - Encourage participation in care  - Encourage verbalization of concerns/fears  - Identify coping mechanisms  - Assist in developing anxiety-reducing skills  - Administer/offer alternative therapies  - Limit or eliminate stimulants  Outcome: Progressing     Problem: Alteration in Orientation  Goal: Treatment Goal: Demonstrate a reduction of confusion and improved orientation to person, place, time and/or situation upon discharge, according to optimum baseline/ability  Outcome: Progressing     Problem: DISCHARGE PLANNING - CARE MANAGEMENT  Goal: Discharge to post-acute care or home with appropriate resources  Description: INTERVENTIONS:  - Conduct assessment to determine patient/family and health care team treatment goals, and need for post-acute services based on payer coverage, community resources, and patient preferences, and barriers to discharge  - Address psychosocial, clinical, and financial barriers to discharge as identified in assessment in conjunction with the patient/family and health care team  - Arrange appropriate level of post-acute services according to patient’s   needs and preference and payer coverage in collaboration with the physician and health care team  - Communicate with and update the patient/family,  physician, and health care team regarding progress on the discharge plan  - Arrange appropriate transportation to post-acute venues  Outcome: Progressing

## 2024-04-02 NOTE — SOCIAL WORK
TC to aunt (Hanh Browne) [phone] to introduce self and attempt to coordinate care - phone number not in service.    TC to sister Marleny Christianson [phone] - Sister is on board with a virtual visit Monday at 6pm due to her work schedule. Sister will follow up regarding aunt's phone being disconnected. Sister inquired about pt discharging to a residential facility for additional support. SW discussed wait times and alternative resources including ACT. SW provided education on ACT services as a whole. Sister stated that she feels that would be an appropriate level of care for him but has concerns because his aunt is reportedly moving from [place] to [place] and is actively looking for a place to move to, likely within the next few weeks. SW requested to be kept updated on aunt's living situation for discharge planning for pt.

## 2024-04-02 NOTE — PROGRESS NOTES
04/02/24 0727   Team Meeting   Meeting Type Daily Rounds   Team Members Present   Team Members Present Physician;Nurse;;Other (Discipline and Name)   Patient/Family Present   Patient Present No   Patient's Family Present No     In attendance:  Dr. Alex Thomas, MD Dr. Jordan Holter, DO Eveline Hunt, HOLLI Taylor, RN  Luisana Alvarado, [facility]  Carla Lux, [facility]  Irais Mcdowell M.S.    Groups: 1/8    Pt is on schedule for ECT tomorrow. Pt had labs drawn with no concerns. Pt is pleasant, cooperative, mostly keeps to himself. Pt identified ongoing auditory hallucinations that cause him anxiety.

## 2024-04-02 NOTE — CONSULTS
Consult Note- Podiatry   [facility]  Alberto Berumen 28 y.o. male MRN: [id number]  Unit/Bed#: [facility] 107-01 Encounter: [id number]    Assessment/Plan     Assessment:  1. Onychomycosis x 10  2. PVD  3. Pain toes b/l     Plan:  - -pt eval and managed    - Number and complexity of problems addressed:  1 undiagnosed new problem with uncertain prognosis   as shown    - Amount/complexity of data reviewed and analyzed:     Category 1: prior patient notes were analyzed today before evaluating and managing patient. All PMH were discussed with pt today.     - Risk of complications: moderate risk of morbidity from additional testing or treatment involved with this patient, which includes but not limited to:    - discussed anatomy, condition, treatment plan and options. They were instructed on proper foot care. The patient was seen today for greater than total of  45-59 minutes   . This is total time spent today involving both face-to-face time and non face-to-face time. This time spent includes  reviewing their past medical history  , performing a medically appropriate examination and evaluation of the patient, counseling and educating the patient,  documenting all findings in EMR, and independently interpreting results and communicating results with  patient and discussing their condition and treatment options, risks, and potential complications. I have discussed the findings of this examination with the patient. The discussion included a complete verbal explanation of the examination results, diagnosis and planned treatment(s). A schedule for future care needs was explained. The patient has verbalized the understanding of these instructions at this time. If any questions should arise after returning home I have encouraged the patient to feel free to call the office.    - d/w pt that discomfort is secondary to toe nail thickening  - Hallucal mycotic nails x2 debrided decreasing thickness by 1 mm. Mycotic toe  nails 2-5 b/l were trimmed, decreasing length, without incidence utilizing a sharp nail nipper.    - All questions and concerns addressed.    - Podiatry signing off, thank you for the consult.     History of Present Illness     HPI:  Alberto Berumen is a 28 y.o. male who presents with painful, elongated toenails. They have difficulty applying their socks and shoes due to the elongation of the nails. The pressure within their shoe gear is painful and they have been unable to cut their nails adequately. Patient states pain is 1/10 in shoe gear. Pain with pressure. Requires at risk foot care.     Inpatient consult to Podiatry  Consult performed by: Brittney Garcia DPM  Consult ordered by: Jordan C Holter, DO        Review of Systems   Constitutional: Negative.    HENT: Negative.    Eyes: Negative.    Respiratory: Negative.    Cardiovascular: Negative.    Gastrointestinal: Negative.    Musculoskeletal: Negative   Skin: elongated thickened toenails   Neurological: Negative.        Historical Information   Past Medical History:   Diagnosis Date    Anemia     Chronic idiopathic constipation     GERD (gastroesophageal reflux disease)     Schizoaffective disorder (HCC)     Syringoma     T wave inversion in EKG      Past Surgical History:   Procedure Laterality Date    COLONOSCOPY       Social History   Social History     Substance and Sexual Activity   Alcohol Use Not Currently    Comment: pt reports he does not drink     Social History     Substance and Sexual Activity   Drug Use Not Currently     Social History     Tobacco Use   Smoking Status Every Day    Current packs/day: 0.25    Types: Cigarettes   Smokeless Tobacco Never   Tobacco Comments    Pt reports he does not want to quit and does not want information. He declines Quitline information     Family History: History reviewed. No pertinent family history.    Meds/Allergies   Medications Prior to Admission   Medication    benztropine (COGENTIN) 0.5 mg  "tablet    escitalopram (LEXAPRO) 10 mg tablet    haloperidol (HALDOL) 10 mg tablet    lithium carbonate (LITHOBID) 300 mg CR tablet    mirtazapine (REMERON) 30 mg tablet    OLANZapine (ZyPREXA) 20 MG tablet    traZODone (DESYREL) 50 mg tablet     Allergies   Allergen Reactions    Pollen Extract Sneezing       Objective   First Vitals:   Blood Pressure: 122/76 (03/29/24 2000)  Pulse: 100 (03/29/24 2000)  Temperature: 97.6 °F (36.4 °C) (03/29/24 2000)  Temp Source: Tympanic (03/29/24 2000)  Respirations: 18 (03/29/24 2000)  Height: 5' 6\" (167.6 cm) (03/29/24 2000)  Weight - Scale: 116 kg (256 lb 6.4 oz) (03/29/24 2000)  SpO2: 99 % (03/29/24 2000)    Current Vitals:   Blood Pressure: 127/77 (04/02/24 0753)  Pulse: 97 (04/02/24 0753)  Temperature: 98.7 °F (37.1 °C) (04/02/24 0753)  Temp Source: Temporal (04/02/24 0753)  Respirations: 18 (04/02/24 0753)  Height: 5' 6\" (167.6 cm) (03/29/24 2225)  Weight - Scale: 116 kg (256 lb) (03/29/24 2225)  SpO2: 97 % (04/02/24 0753)    /77 (BP Location: Left arm)   Pulse 97   Temp 98.7 °F (37.1 °C) (Temporal)   Resp 18   Ht 5' 6\" (1.676 m)   Wt 116 kg (256 lb)   SpO2 97%   BMI 41.32 kg/m²     General Appearance:    Alert, cooperative, no distress            Extremities:   MMT is 5/5 to all compartments of the LE, +1/4 edema B/L, Digital ROM is intact,    Pulses:   R DP is +1/4, R PT is +1/4, L DP is +1/4, L PT is +1/4, CFT< 3sec to all digits. Thin/shiny skin noted to the B/L LE, pigmentary changes to B/L LE. Absent digital hair growth b/l.    Skin:   Nail thickening b/l. Nails are yellow, discolored, thickened, elongated, with notable subungual debris and > 2 mm thickness noted to toenails 1-5 B/L. No open Lesions.          Neurologic:   Normal strength, sensation and reflexes       Throughout. Gross sensation is intact. Protective sensation is diminished                Lab Results:   Admission on 03/29/2024   Component Date Value    Clozapine Lvl 03/30/2024 636     WBC " 04/01/2024 9.45     RBC 04/01/2024 5.36     Hemoglobin 04/01/2024 12.9     Hematocrit 04/01/2024 41.8     MCV 04/01/2024 78 (L)     MCH 04/01/2024 24.1 (L)     MCHC 04/01/2024 30.9 (L)     RDW 04/01/2024 13.5     MPV 04/01/2024 11.5     Platelets 04/01/2024 237     nRBC 04/01/2024 0     Neutrophils Relative 04/01/2024 59     Immature Grans % 04/01/2024 1     Lymphocytes Relative 04/01/2024 27     Monocytes Relative 04/01/2024 9     Eosinophils Relative 04/01/2024 3     Basophils Relative 04/01/2024 1     Neutrophils Absolute 04/01/2024 5.65     Absolute Immature Grans 04/01/2024 0.06     Absolute Lymphocytes 04/01/2024 2.58     Absolute Monocytes 04/01/2024 0.81     Eosinophils Absolute 04/01/2024 0.30     Basophils Absolute 04/01/2024 0.05     Total CK 04/01/2024 171     BNP 04/01/2024 9     HS TnI random 04/01/2024 4 (L)     Sodium 04/01/2024 139     Potassium 04/01/2024 4.1     Chloride 04/01/2024 103     CO2 04/01/2024 27     ANION GAP 04/01/2024 9     BUN 04/01/2024 12     Creatinine 04/01/2024 0.90     Glucose 04/01/2024 120     Calcium 04/01/2024 9.3     AST 04/01/2024 23     ALT 04/01/2024 53 (H)     Alkaline Phosphatase 04/01/2024 84     Total Protein 04/01/2024 7.2     Albumin 04/01/2024 4.0     Total Bilirubin 04/01/2024 0.23     eGFR 04/01/2024 116     Hemoglobin A1C 04/01/2024 5.0     EAG 04/01/2024 97     Vitamin B-12 04/01/2024 268     Folate 04/01/2024 14.2     Vit D, 25-Hydroxy 04/01/2024 12.3 (L)      Imaging: I have personally reviewed pertinent films in PACS  EKG, Pathology, and Other Studies: I have personally reviewed pertinent reports.      Code Status: Level 1 - Full Code  Advance Directive and Living Will:      Power of :

## 2024-04-02 NOTE — PROGRESS NOTES
Psychiatry Progress Note [facility]    Alberto Berumen 28 y.o. male MRN: [id number]  Unit/Bed#: [facility] 107-01 Encounter: [id number]  Code Status: Level 1 - Full Code    PCP: Joce Juan MD    Date of Admission:  3/29/2024 2008   Date of Service:  04/02/24    Patient Active Problem List   Diagnosis    GERD (gastroesophageal reflux disease)    Medical clearance for psychiatric admission    Schizoaffective disorder, bipolar type (HCC)    Tobacco abuse    T wave inversion in EKG    Syringoma    Chronic idiopathic constipation    Vitamin B 12 deficiency    Vitamin D deficiency    Confluent and reticulate papillomatosis    Class 2 obesity in adult    Primary hypertension    Elevated hemoglobin A1c         Review of systems: unremarkable,Vit D level low   Diagnosis: Schizoaffective bipolar    Assessment  Overall Status: Continues to hear the same voices telling him that he and his family are going to be killed and is still with same paranoia as before but not aggressive or agitated staying to himself preoccupied compliant with medications and willing to continue with ECT   certification Statement: The patient will continue to require additional inpatient hospital stay due to ongoing voices that are threatening to kill him and his family lack of response to medications and ECT so far       Medications: Clozapine 450 mg at bedtime, propranolol 10 mg every 12 hours as as needed for anxiety, Lexapro 20 mg once a day, atropine eyedrops sublingual for drooling of saliva and senna 2 tablets twice a day for constipation  Side effects from treatment: None reported  Medication changes   None at this time but will need to titrate Clozapine depending on blood work   Medication education   Risks side effects benefits and precautions of medications discussed with patient and he did verbalize an understanding about risks for metabolic syndrome from being on neuroleptics and is form tardive dyskinesia etc. and  special precautions about being on clozapine   Understanding of medications: Has some understanding   Justification for dual anti-psychotics: Not applicable    Non-pharmacological treatments  Continue with individual, group, milieu and occupational therapy using recovery principles and psycho-education about accepting illness and the need for treatment.   to contact aunt and explore ACT team referral which he never had  ECT to be continued 2 more times on Wed & Friday for a total of 15 followed by weekly maintenance ECTs    Safety  Safety and communication plan established to target dynamic risk factors discussed above.    Discharge Plan   Back to his aunt with an ACT team    Interval Progress   Patient is attending groups but is somewhat isolated staying to himself with minimal interaction with peers seen laying in bed in his room.  Continues to struggle with hearing threatening voices that tell him that he and his family are going to be killed and this has been going on for more than 2 years and he says the voices as the same as before but he feels ECT has helped to some extent.  He has tolerated the clozapine.  Reports having had no suicidal thoughts or plans.  Has not required any behavioral PRNs and attending only some groups.  Not aggressive or agitated or self-abusive or threatening or destructive on the unit    Acceptance by patient: Accepting  Hopefulness in recovery: Living with his aunt again  Involved in reintegration process: Talking with his aunt  Trusting in relationship with psychiatrist: Trusting  Sleep: Good  Appetite: Good and markedly overweight  Compliance with Medications: Compliant  Group attendance: Attending some 4/8, about 50%   Significant events: Adjusting to the unit      Mental Status Exam  Appearance: age appropriate, improved grooming, looks older than stated age, overweight, with hair and dreadlocks casually dressed fairly groomed with good eye contact  Behavior:  cooperative, mildly anxious, evasive, gesturing, slow responses  Speech: normal rate, normal volume, normal pitch  Mood: dysphoric, anxious, reports he feels good  Affect: constricted, inappropriate, mood-congruent  Thought Process: organized, logical, coherent, goal directed, linear, decreased rate of thoughts, slowing of thoughts, negative thinking, impaired abstract reasoning, concrete  Thought Content: paranoid ideation, some paranoia, intrusive thoughts, preoccupied, chronic  Perceptual Disturbances: auditory hallucinations, appears preoccupied, appears responding to internal stimuli, not observed, reports voices tell him that he and his family are going to be killed sometimes from 1 person on more than one person talking to him.  No current suicidal or homicidal thoughts and no plans verbalized.  No phobias obsessions compulsions or distorted body perceptions reported.  Hx Risk Factors: chronic psychiatric problems, chronic anxiety symptoms, chronic psychotic symptoms,    Sensorium: alert and oriented x 3 spheres  Cognition: recent and remote memory grossly intact  Consciousness: alert and awake  Attention: attention span and concentration are age appropriate  Intellect: appears to be of average intelligence  Insight: intact  Judgement: intact  Motor Activity: no abnormal movements     Vitals  Temp:  [97.5 °F (36.4 °C)-97.6 °F (36.4 °C)] 97.5 °F (36.4 °C)  HR:  [] 99  Resp:  [18] 18  BP: (136-152)/(70-98) 149/70  SpO2:  [99 %-100 %] 99 %  No intake or output data in the 24 hours ending 04/02/24 0454    Lab Results: All Labs For Current Hospital Admission Reviewed ANC on 4/2/24 was 5650, rest of labs OK but Vit D level is  low      Current Facility-Administered Medications   Medication Dose Route Frequency Provider Last Rate    acetaminophen  650 mg Oral Q6H PRN Ion C Holter, DO      acetaminophen  650 mg Oral Q4H PRN Ion C Holter, DO      acetaminophen  975 mg Oral Q6H PRN Ion C Holter, DO       aluminum-magnesium hydroxide-simethicone  30 mL Oral Q4H PRN [facility] Holter, DO      amLODIPine  5 mg Oral Daily HOLLI Lion      Artificial Tears  1 drop Both Eyes Q3H PRN [facility] Holter, DO      atropine  1 drop Sublingual HS HOLLI Lion      atropine  1 drop Sublingual Daily PRN HOLLI Lion      benztropine  1 mg Intramuscular Q4H PRN Max 6/day [facility] Holter, DO      benztropine  1 mg Oral Q4H PRN Max 6/day HOLLI Lion      benztropine  1 mg Oral Q4H PRN Max 6/day [facility] Holter, DO      cloZAPine  450 mg Oral HS HOLLI Lion      hydrOXYzine HCL  50 mg Oral Q6H PRN Max 4/day [facility] Holter, DO      Or    diphenhydrAMINE  50 mg Intramuscular Q6H PRN [facility] Holter, DO      diphenhydrAMINE-zinc acetate   Topical BID PRN HOLLI Lion      escitalopram  20 mg Oral Daily HOLLI Lion      hydrocortisone   Topical 4x Daily PRN HOLLI Lion      hydrOXYzine HCL  100 mg Oral Q6H PRN Max 4/day [facility] Holter, DO      Or    LORazepam  2 mg Intramuscular Q6H PRN [facility] Holter, DO      hydrOXYzine HCL  25 mg Oral Q6H PRN Max 4/day [facility] Holter, DO      ibuprofen  600 mg Oral Q8H PRN HOLLI Lion      melatonin  3 mg Oral HS [facility] Holter, DO      metFORMIN  500 mg Oral Daily With Breakfast HOLLI Lion      methocarbamol  500 mg Oral Q6H PRN HOLLI Lion      minocycline  100 mg Oral Q12H KAYLIE HOLLI Lion      nicotine polacrilex  2 mg Oral Q2H PRN [facility] Holter, DO      OLANZapine  5 mg Oral Q4H PRN Max 3/day [facility] Holter, DO      Or    OLANZapine  2.5 mg Intramuscular Q4H PRN Max 3/day [facility] Holter, DO      OLANZapine  5 mg Oral Q3H PRN Max 3/day [facility] Holter, DO      Or    OLANZapine  5 mg Intramuscular Q3H PRN Max 3/day [facility] Holter, DO      OLANZapine  2.5 mg Oral Q4H PRN Max 6/day [facility] Holter, DO      ondansetron  4 mg Oral Q6H PRN HOLLI Lion      polyethylene glycol  17 g Oral Daily HOLLI Lion       polyethylene glycol  17 g Oral Daily PRN Jordan C Holter,       propranolol  10 mg Oral Q12H [facility] HOLLI Lion      senna-docusate sodium  1 tablet Oral Daily PRN Jordan C Holter,       senna-docusate sodium  2 tablet Oral BID HOLLI Lion      white petrolatum-mineral oil   Topical TID PRN HOLLI Lion         Counseling / Coordination of Care: Total floor / unit time spent today 15 minutes. Greater than 50% of total time was spent with the patient and / or family counseling and / or somewhat receptive to supportive listening and teaching positive coping skills to deal with symptom mangement.     Patient's Rights, confidentiality and exceptions to confidentiality, use of automated medical record, [facility] Services staff access to medical record, and consent to treatment reviewed.    This note has been dictated and hence there may be problems with punctuation, spelling and formatting and if anyone has any concerns please address them to Dr. Rosario   This note is not shared with patient due to potential for making patient's condition worse by knowing the content of the note.

## 2024-04-02 NOTE — NURSING NOTE
"Alberto maintained on ongoing SAFE precautions without incident on this shift. Awake, alert, isolative to self, cooperative and pleasant upon approach.  Attended and participated in 1 out 7 groups today.  Continues to be compliant with meds and snack.  Admit to hearing voices telling him negative things.  He would not elaborated what those negative things the voice are saying.  Ask Alberto what he normal do when the voices are bothersome.  Alberto stated that  \"I just deal with them as they come.\"  Remind him that when and if the voices are unbearable to seek help from staff.    "

## 2024-04-02 NOTE — NURSING NOTE
Alberto maintain on ongoing SAFE precaution without incident on this shift. Observed resting in bed with eyes closed, respiration even and unlabored. Continuous Q 7 minutes check implemented thru the night. No behavioral noted.

## 2024-04-03 ENCOUNTER — ANESTHESIA EVENT (INPATIENT)
Dept: PREOP/PACU | Facility: HOSPITAL | Age: 29
End: 2024-04-03
Payer: COMMERCIAL

## 2024-04-03 ENCOUNTER — ANESTHESIA (INPATIENT)
Dept: PREOP/PACU | Facility: HOSPITAL | Age: 29
End: 2024-04-03
Payer: COMMERCIAL

## 2024-04-03 ENCOUNTER — APPOINTMENT (INPATIENT)
Dept: PREOP/PACU | Facility: HOSPITAL | Age: 29
DRG: 750 | End: 2024-04-03
Attending: STUDENT IN AN ORGANIZED HEALTH CARE EDUCATION/TRAINING PROGRAM
Payer: COMMERCIAL

## 2024-04-03 PROCEDURE — 90870 ELECTROCONVULSIVE THERAPY: CPT | Performed by: ANESTHESIOLOGY

## 2024-04-03 PROCEDURE — GZB2ZZZ ELECTROCONVULSIVE THERAPY, BILATERAL-SINGLE SEIZURE: ICD-10-PCS | Performed by: PSYCHIATRY & NEUROLOGY

## 2024-04-03 PROCEDURE — 99232 SBSQ HOSP IP/OBS MODERATE 35: CPT | Performed by: PSYCHIATRY & NEUROLOGY

## 2024-04-03 PROCEDURE — 90870 ELECTROCONVULSIVE THERAPY: CPT | Performed by: PSYCHIATRY & NEUROLOGY

## 2024-04-03 RX ORDER — KETOROLAC TROMETHAMINE 30 MG/ML
INJECTION, SOLUTION INTRAMUSCULAR; INTRAVENOUS AS NEEDED
Status: DISCONTINUED | OUTPATIENT
Start: 2024-04-03 | End: 2024-04-03

## 2024-04-03 RX ORDER — ESMOLOL HYDROCHLORIDE 10 MG/ML
INJECTION INTRAVENOUS AS NEEDED
Status: DISCONTINUED | OUTPATIENT
Start: 2024-04-03 | End: 2024-04-03

## 2024-04-03 RX ORDER — METHOHEXITAL IN WATER/PF 100MG/10ML
SYRINGE (ML) INTRAVENOUS AS NEEDED
Status: DISCONTINUED | OUTPATIENT
Start: 2024-04-03 | End: 2024-04-03

## 2024-04-03 RX ORDER — SODIUM CHLORIDE, SODIUM LACTATE, POTASSIUM CHLORIDE, CALCIUM CHLORIDE 600; 310; 30; 20 MG/100ML; MG/100ML; MG/100ML; MG/100ML
INJECTION, SOLUTION INTRAVENOUS CONTINUOUS PRN
Status: DISCONTINUED | OUTPATIENT
Start: 2024-04-03 | End: 2024-04-03

## 2024-04-03 RX ORDER — SUCCINYLCHOLINE/SOD CL,ISO/PF 100 MG/5ML
SYRINGE (ML) INTRAVENOUS AS NEEDED
Status: DISCONTINUED | OUTPATIENT
Start: 2024-04-03 | End: 2024-04-03

## 2024-04-03 RX ORDER — GLYCOPYRROLATE 0.2 MG/ML
INJECTION INTRAMUSCULAR; INTRAVENOUS AS NEEDED
Status: DISCONTINUED | OUTPATIENT
Start: 2024-04-03 | End: 2024-04-03

## 2024-04-03 RX ORDER — LABETALOL HYDROCHLORIDE 5 MG/ML
INJECTION, SOLUTION INTRAVENOUS AS NEEDED
Status: DISCONTINUED | OUTPATIENT
Start: 2024-04-03 | End: 2024-04-03

## 2024-04-03 RX ADMIN — CYANOCOBALAMIN TAB 1000 MCG 1000 MCG: 1000 TAB at 08:50

## 2024-04-03 RX ADMIN — MINOCYCLINE HYDROCHLORIDE 100 MG: 100 CAPSULE ORAL at 20:41

## 2024-04-03 RX ADMIN — SODIUM CHLORIDE, SODIUM LACTATE, POTASSIUM CHLORIDE, AND CALCIUM CHLORIDE: .6; .31; .03; .02 INJECTION, SOLUTION INTRAVENOUS at 06:15

## 2024-04-03 RX ADMIN — MINOCYCLINE HYDROCHLORIDE 100 MG: 100 CAPSULE ORAL at 08:50

## 2024-04-03 RX ADMIN — CLOZAPINE 450 MG: 25 TABLET ORAL at 21:18

## 2024-04-03 RX ADMIN — ESCITALOPRAM OXALATE 20 MG: 10 TABLET, FILM COATED ORAL at 08:50

## 2024-04-03 RX ADMIN — NICOTINE POLACRILEX 2 MG: 2 GUM, CHEWING BUCCAL at 17:51

## 2024-04-03 RX ADMIN — NICOTINE POLACRILEX 2 MG: 2 GUM, CHEWING BUCCAL at 20:41

## 2024-04-03 RX ADMIN — Medication 120 MG: at 06:51

## 2024-04-03 RX ADMIN — GLYCOPYRROLATE 0.4 MG: 0.2 INJECTION INTRAMUSCULAR; INTRAVENOUS at 06:40

## 2024-04-03 RX ADMIN — LABETALOL 20 MG/4 ML (5 MG/ML) INTRAVENOUS SYRINGE 20 MG: at 06:51

## 2024-04-03 RX ADMIN — Medication 140 MG: at 06:51

## 2024-04-03 RX ADMIN — PROPRANOLOL HYDROCHLORIDE 10 MG: 10 TABLET ORAL at 20:41

## 2024-04-03 RX ADMIN — POLYETHYLENE GLYCOL 3350 17 G: 17 POWDER, FOR SOLUTION ORAL at 08:55

## 2024-04-03 RX ADMIN — NICOTINE POLACRILEX 2 MG: 2 GUM, CHEWING BUCCAL at 15:33

## 2024-04-03 RX ADMIN — METFORMIN HYDROCHLORIDE 500 MG: 500 TABLET ORAL at 08:50

## 2024-04-03 RX ADMIN — Medication 50 MG: at 06:53

## 2024-04-03 RX ADMIN — MELATONIN TAB 3 MG 3 MG: 3 TAB at 21:18

## 2024-04-03 RX ADMIN — SENNOSIDES AND DOCUSATE SODIUM 2 TABLET: 8.6; 5 TABLET ORAL at 17:36

## 2024-04-03 RX ADMIN — NICOTINE POLACRILEX 2 MG: 2 GUM, CHEWING BUCCAL at 12:38

## 2024-04-03 RX ADMIN — AMLODIPINE BESYLATE 5 MG: 5 TABLET ORAL at 05:33

## 2024-04-03 RX ADMIN — KETOROLAC TROMETHAMINE 30 MG: 30 INJECTION, SOLUTION INTRAMUSCULAR; INTRAVENOUS at 06:45

## 2024-04-03 RX ADMIN — SENNOSIDES AND DOCUSATE SODIUM 2 TABLET: 8.6; 5 TABLET ORAL at 08:50

## 2024-04-03 RX ADMIN — ATROPINE SULFATE 1 DROP: 10 SOLUTION/ DROPS OPHTHALMIC at 21:18

## 2024-04-03 RX ADMIN — PROPRANOLOL HYDROCHLORIDE 10 MG: 10 TABLET ORAL at 05:31

## 2024-04-03 NOTE — NURSING NOTE
"Patient transported from ect. . Patient's vital signs stable. Patient is able to state where he is  however thinks it is March. He states \"that ect is helping him stay out of his head.\" Patient past swallow evaluation. He was able to eat breakfast and take medications. Patient resting in his room.   "

## 2024-04-03 NOTE — ANESTHESIA POSTPROCEDURE EVALUATION
Post-Op Assessment Note    CV Status:  Stable    Pain management: adequate       Mental Status:  Awake and arousable   Hydration Status:  Stable   PONV Controlled:  None   Airway Patency:  Patent     Post Op Vitals Reviewed: Yes    No anethesia notable event occurred.    Staff: CRNA           BP      Temp      Pulse     Resp      SpO2

## 2024-04-03 NOTE — PLAN OF CARE
Problem: Ineffective Coping  Goal: Identifies ineffective coping skills  Outcome: Not Progressing  Goal: Identifies healthy coping skills  Outcome: Not Progressing  Goal: Demonstrates healthy coping skills  Outcome: Not Progressing  Goal: Participates in unit activities  Description: Interventions:  - Provide therapeutic environment   - Provide required programming   - Redirect inappropriate behaviors   Outcome: Not Progressing

## 2024-04-03 NOTE — PROGRESS NOTES
Psychiatry Progress Note [facility]    Alberto Berumen 28 y.o. male MRN: [id number]  Unit/Bed#: [facility] 107-01 Encounter: [id number]  Code Status: Level 1 - Full Code    PCP: Joce Juan MD    Date of Admission:  3/29/2024 2008   Date of Service:  04/03/24    Patient Active Problem List   Diagnosis    GERD (gastroesophageal reflux disease)    Medical clearance for psychiatric admission    Schizoaffective disorder, bipolar type (HCC)    Tobacco abuse    T wave inversion in EKG    Syringoma    Chronic idiopathic constipation    Vitamin B 12 deficiency    Vitamin D deficiency    Confluent and reticulate papillomatosis    Class 2 obesity in adult    Primary hypertension    Elevated hemoglobin A1c         Review of systems: Unremarkable, podiatry consult done for trimming kia from onychomycosis  Diagnosis: Schizoaffective bipolar    Assessment  Overall Status: Patient started back on his ECG #14 today and will get another one this Friday followed by weekly maintenance ECTs.   certification Statement: The patient will continue to require additional inpatient hospital stay due to ongoing voices that are threatening to kill him and his family lack of response to medications and ECT so far.  Still hears voices that are threatening but not commanding.  His aunt is now planning to move out to [place] and therefore he needs to possibly go into a supervised setting like a group home     Medications: Clozapine 450 mg at bedtime, propranolol 10 mg every 12 hours as as needed for anxiety, Lexapro 20 mg once a day, atropine eyedrops sublingual for drooling of saliva and senna 2 tablets twice a day for constipation  Or medications reviewed and I recommend they be continued for symptom management  Side effects from treatment: None reported  Medication changes   None at this time but will need to titrate Clozapine depending on blood work   Medication education   Risks side effects benefits and precautions of  medications discussed with patient and he did verbalize an understanding about risks for metabolic syndrome from being on neuroleptics and is form tardive dyskinesia etc. and special precautions about being on clozapine   Understanding of medications: Has some understanding   Justification for dual anti-psychotics: Not applicable    Non-pharmacological treatments  Continue with individual, group, milieu and occupational therapy using recovery principles and psycho-education about accepting illness and the need for treatment.   to contact aunt and explore ACT team referral which he never had  ECT to be continued 2 more times on Wed & Friday for a total of 15 followed by weekly maintenance ECTs    Safety  Safety and communication plan established to target dynamic risk factors discussed above.    Discharge Plan   Back to his aunt with an ACT team    Interval Progress   Continues to report same voices that are threatening to harm him and his family but not commanding but he fears the ACT and medication have helped to some extent.  He had  #14 ECT today and will get another one this Friday followed by maintenance ECTs weekly up to 15 more sessions if needed.  He has been tolerating the clozapine well so far he reports no suicidal thoughts intent or plans lately and reports no other overt psychotic symptoms other than hearing voices and feeling paranoid.  Has not been aggressive or agitated or self-abusive or threatening or destructive on the unit.    Acceptance by patient: Accepting  Hopefulness in recovery: Living with his aunt again but she is moving to [place]  Involved in reintegration process: Talking with his aunt and sister  Trusting in relationship with psychiatrist: Trusting  Sleep: Good  Appetite: Good and overweight  Compliance with Medications: Compliant  Group attendance: 0/10  Significant events: Adjusting to the unit      Mental Status Exam  Appearance: age appropriate, improved grooming,  looks older than stated age, overweight, with hair and dreadlocks casually dressed fairly groomed with good eye contact found laying on bed in his room on his bed  Behavior: cooperative, mildly anxious, evasive, gesturing, slow responses  Speech: normal rate, normal volume, normal pitch  Mood: dysphoric, anxious, reports he feels good  Affect: constricted, inappropriate, mood-congruent  Thought Process: organized, logical, coherent, goal directed, linear, decreased rate of thoughts, slowing of thoughts, negative thinking, impaired abstract reasoning, concrete  Thought Content: paranoid ideation, some paranoia, intrusive thoughts, preoccupied, chronic  Perceptual Disturbances: auditory hallucinations, appears preoccupied, appears responding to internal stimuli, not observed, reports voices tell him that he and his family are going to be killed sometimes from 1 person on more than one person talking to him.  No current suicidal or homicidal thoughts and no plans verbalized.  No phobias obsessions compulsions or distorted body perceptions reported.  Hx Risk Factors: chronic psychiatric problems, chronic anxiety symptoms, chronic psychotic symptoms,    Sensorium: alert and oriented x 3 spheres  Cognition: recent and remote memory grossly intact  Consciousness: alert and awake  Attention: attention span and concentration are age appropriate  Intellect: appears to be of average intelligence  Insight: intact  Judgement: intact  Motor Activity: no abnormal movements     Vitals  Temp:  [97.2 °F (36.2 °C)-98.7 °F (37.1 °C)] 97.2 °F (36.2 °C)  HR:  [] 94  Resp:  [16-26] 26  BP: (125-214)/() 144/87  SpO2:  [96 %-99 %] 96 %    Intake/Output Summary (Last 24 hours) at 4/3/2024 0743  Last data filed at 4/3/2024 0707  Gross per 24 hour   Intake 500 ml   Output --   Net 500 ml       Lab Results: All Labs For Current Hospital Admission Reviewed ANC on 4/2/24 was 5650, rest of labs OK but Vit D level is  low      Current  Facility-Administered Medications   Medication Dose Route Frequency Provider Last Rate    acetaminophen  650 mg Oral Q6H PRN [facility] Holter, DO      acetaminophen  650 mg Oral Q4H PRN [facility] Holter, DO      acetaminophen  975 mg Oral Q6H PRN [facility] Holter, DO      aluminum-magnesium hydroxide-simethicone  30 mL Oral Q4H PRN [facility] Holter, DO      amLODIPine  5 mg Oral Daily HOLLI Lion      Artificial Tears  1 drop Both Eyes Q3H PRN [facility] Holter, DO      atropine  1 drop Sublingual HS HOLLI Lion      atropine  1 drop Sublingual Daily PRN HOLLI Lion      benztropine  1 mg Intramuscular Q4H PRN Max 6/day [facility] Holter, DO      benztropine  1 mg Oral Q4H PRN Max 6/day HOLLI Lion      benztropine  1 mg Oral Q4H PRN Max 6/day [facility] Holter, DO      cloZAPine  450 mg Oral HS HOLLI Lion      cyanocobalamin  1,000 mcg Oral Daily HOLLI Galvan      hydrOXYzine HCL  50 mg Oral Q6H PRN Max 4/day [facility] Holter, DO      Or    diphenhydrAMINE  50 mg Intramuscular Q6H PRN [facility] Holter, DO      diphenhydrAMINE-zinc acetate   Topical BID PRN HOLLI Lion      ergocalciferol  50,000 Units Oral Weekly HOLIL Galvan      escitalopram  20 mg Oral Daily HOLLI Lion      hydrocortisone   Topical 4x Daily PRN HOLLI Lion      hydrOXYzine HCL  100 mg Oral Q6H PRN Max 4/day [facility] Holter, DO      Or    LORazepam  2 mg Intramuscular Q6H PRN [facility] Holter, DO      hydrOXYzine HCL  25 mg Oral Q6H PRN Max 4/day [facility] Holter, DO      ibuprofen  600 mg Oral Q8H PRN HOLLI Lion      melatonin  3 mg Oral HS [facility] Holter, DO      metFORMIN  500 mg Oral Daily With Breakfast HOLLI Lion      methocarbamol  500 mg Oral Q6H PRN HOLLI Lion      minocycline  100 mg Oral Q12H KAYLIE HOLLI Lion      nicotine polacrilex  2 mg Oral Q2H PRN [facility] Holter, DO      OLANZapine  5 mg Oral Q4H PRN Max 3/day Jordan C Holter, DO       Or    OLANZapine  2.5 mg Intramuscular Q4H PRN Max 3/day Ion C Holter, DO      OLANZapine  5 mg Oral Q3H PRN Max 3/day [facility] Holter, DO      Or    OLANZapine  5 mg Intramuscular Q3H PRN Max 3/day Ion C Holter, DO      OLANZapine  2.5 mg Oral Q4H PRN Max 6/day [facility] Holter, DO      ondansetron  4 mg Oral Q6H PRN HOLLI Lion      polyethylene glycol  17 g Oral Daily HOLLI Lion      polyethylene glycol  17 g Oral Daily PRN [facility] Holter, DO      propranolol  10 mg Oral Q12H KAYLIE HOLLI Loin      senna-docusate sodium  1 tablet Oral Daily PRN Ion C Holter, DO      senna-docusate sodium  2 tablet Oral BID HOLLI Lion      white petrolatum-mineral oil   Topical TID PRN HOLLI Lion         Counseling / Coordination of Care: Total floor / unit time spent today 15 minutes. Greater than 50% of total time was spent with the patient and / or family counseling and / or somewhat receptive to supportive listening and teaching positive coping skills to deal with symptom mangement.     Patient's Rights, confidentiality and exceptions to confidentiality, use of automated medical record, [facility] staff access to medical record, and consent to treatment reviewed.    This note has been dictated and hence there may be problems with punctuation, spelling and formatting and if anyone has any concerns please address them to Dr. Rosario   This note is not shared with patient due to potential for making patient's condition worse by knowing the content of the note.

## 2024-04-03 NOTE — PROGRESS NOTES
04/03/24 0728   Team Meeting   Meeting Type Daily Rounds   Team Members Present   Team Members Present Physician;Nurse;;Other (Discipline and Name)   Patient/Family Present   Patient Present No   Patient's Family Present No     In attendance:  Dr. Alex Thomas, MD Dr. Jordan Holter, DO Eveline Hunt, HOLLI Travis, MIGUEL Alvarado, [facility]  Carla Lux, W  Irasi Mcdowell M.S.    Groups: 0/10    Pt reports being bothered by continued auditory hallucinations. Pt remains quiet, guarded, isolative. Pt had ECT treatment this morning. Pt reports some anxiety regarding meeting new people so he has been isolating in his room. No other bx issues noted.

## 2024-04-03 NOTE — NURSING NOTE
Pt is isolative to self and room besides meals. Consumed 100% of dinner. Took medications without incidence. Pt is pleasant and cooperative. Attended minimal groups, and was encouraged to attend more groups. Reports feeling groggy and tired since receiving ECT and denies other psych symptoms. No behavioral issues. Pt offers no concerns or complaints. VSS. Continuous safety checks maintained.

## 2024-04-03 NOTE — PLAN OF CARE
Problem: Electroconvulsive therapy (ECT)  Goal: Absence of urinary retention  Description: INTERVENTIONS:  - Assess patient’s ability to void and empty bladder  - Monitor I/O  - Bladder scan as needed  - Discuss with physician/AP medications to alleviate retention as needed  - Discuss catheterization for long term situations as appropriate  Outcome: Progressing  Goal: Minimal or absence of nausea and/or vomiting  Description: INTERVENTIONS:  - Administer IV fluids if ordered to ensure adequate hydration  - Maintain NPO status until nausea and vomiting are resolved  - Nasogastric tube if ordered  - Administer ordered antiemetic medications as needed  - Provide nonpharmacologic comfort measures as appropriate  - Advance diet as tolerated, if ordered  - Consider nutrition services referral to assist patient with adequate nutrition and appropriate food choices  Outcome: Progressing     Problem: Alteration in Thoughts and Perception  Goal: Verbalize thoughts and feelings  Description: Interventions:  - Promote a nonjudgmental and trusting relationship with the patient through active listening and therapeutic communication  - Assess patient's level of functioning, behavior and potential for risk  - Engage patient in 1 on 1 interactions  - Encourage patient to express fears, feelings, frustrations, and discuss symptoms    - [place] patient to reality, help patient recognize reality-based thinking   - Administer medications as ordered and assess for potential side effects  - Provide the patient education related to the signs and symptoms of the illness and desired effects of prescribed medications  Outcome: Not Progressing  Goal: Refrain from acting on delusional thinking/internal stimuli  Description: Interventions:  - Monitor patient closely, per order   - Utilize least restrictive measures   - Set reasonable limits, give positive feedback for acceptable   - Administer medications as ordered and monitor of potential side  effects  Outcome: Not Progressing  Goal: Agree to be compliant with medication regime, as prescribed and report medication side effects  Description: Interventions:  - Offer appropriate PRN medication and supervise ingestion; conduct AIMS, as needed   Outcome: Not Progressing  Goal: Attend and participate in unit activities, including therapeutic, recreational, and educational groups  Description: Interventions:  -Encourage Visitation and family involvement in care  Outcome: Not Progressing  Goal: Recognize dysfunctional thoughts, communicate reality-based thoughts at the time of discharge  Description: Interventions:  - Provide medication and psycho-education to assist patient in compliance and developing insight into his/her illness   Outcome: Not Progressing  Goal: Complete daily ADLs, including personal hygiene independently, as able  Description: Interventions:  - Observe, teach, and assist patient with ADLS  - Monitor and promote a balance of rest/activity, with adequate nutrition and elimination   Outcome: Not Progressing     Problem: Ineffective Coping  Goal: Cooperates with admission process  Description: Interventions:   - Complete admission process  Outcome: Not Progressing  Goal: Identifies ineffective coping skills  Outcome: Not Progressing  Goal: Identifies healthy coping skills  Outcome: Not Progressing  Goal: Demonstrates healthy coping skills  Outcome: Not Progressing  Goal: Participates in unit activities  Description: Interventions:  - Provide therapeutic environment   - Provide required programming   - Redirect inappropriate behaviors   Outcome: Not Progressing  Goal: Patient/Family participate in treatment and DC plans  Description: Interventions:  - Provide therapeutic environment  Outcome: Not Progressing  Goal: Patient/Family verbalizes awareness of resources  Outcome: Not Progressing     Problem: Depression  Goal: Verbalize thoughts and feelings  Description: Interventions:  - Assess and  re-assess patient's level of risk   - Engage patient in 1:1 interactions, daily, for a minimum of 15 minutes   - Encourage patient to express feelings, fears, frustrations, hopes   Outcome: Not Progressing  Goal: Refrain from harming self  Description: Interventions:  - Monitor patient closely, per order   - Supervise medication ingestion, monitor effects and side effects   Outcome: Not Progressing  Goal: Refrain from isolation  Description: Interventions:  - Develop a trusting relationship   - Encourage socialization   Outcome: Not Progressing  Goal: Attend and participate in unit activities, including therapeutic, recreational, and educational groups  Description: Interventions:  - Provide therapeutic and educational activities daily, encourage attendance and participation, and document same in the medical record   Outcome: Not Progressing  Goal: Complete daily ADLs, including personal hygiene independently, as able  Description: Interventions:  - Observe, teach, and assist patient with ADLS  -  Monitor and promote a balance of rest/activity, with adequate nutrition and elimination   Outcome: Not Progressing     Problem: Anxiety  Goal: Anxiety is at manageable level  Description: Interventions:  - Assess and monitor patient's anxiety level.   - Monitor for signs and symptoms (heart palpitations, chest pain, shortness of breath, headaches, nausea, feeling jumpy, restlessness, irritable, apprehensive).   - Collaborate with interdisciplinary team and initiate plan and interventions as ordered.  - [place] patient to unit/surroundings  - Explain treatment plan  - Encourage participation in care  - Encourage verbalization of concerns/fears  - Identify coping mechanisms  - Assist in developing anxiety-reducing skills  - Administer/offer alternative therapies  - Limit or eliminate stimulants  Outcome: Not Progressing     Problem: Alteration in Orientation  Goal: Interact with staff daily  Description: Interventions:  -  Assess and re-assess patient's level of orientation  - Engage patient in 1 on 1 interactions, daily, for a minimum of 15 minutes   - Establish rapport/trust with patient   Outcome: Not Progressing  Goal: Express concerns related to confused thinking related to:  Description: Interventions:  - Encourage patient to express feelings, fears, frustrations, hopes  - Assign consistent caregivers   - [place]/re-orient patient as needed  - Allow comfort items, as appropriate  - Provide visual cues, signs, etc.   Outcome: Not Progressing  Goal: Allow medical examinations, as recommended  Description: Interventions:  - Provide physical/neurological exams and/or referrals, per provider   Outcome: Not Progressing  Goal: Cooperate with recommended testing/procedures  Description: Interventions:  - Determine need for ancillary testing  - Observe for mental status changes  - Implement falls/precaution protocol   Outcome: Not Progressing  Goal: Attend and participate in unit activities, including therapeutic, recreational, and educational groups  Description: Interventions:  - Provide therapeutic and educational activities daily, encourage attendance and participation, and document same in the medical record   - Provide appropriate opportunities for reminiscence   - Provide a consistent daily routine   - Encourage family contact/visitation   Outcome: Not Progressing  Goal: Complete daily ADLs, including personal hygiene independently, as able  Description: Interventions:  - Observe, teach, and assist patient with ADLS  Outcome: Not Progressing     Problem: Individualized Interventions  Goal: Patient will verbalize appropriate use of telephone within 5 days  Description: Interventions:  - Treatment team to determine use of supervised phone privileges   Outcome: Not Progressing  Goal: Patient will verbalize need for hospitalization and will no longer attempt elopement within 5 days  Description: Interventions:  - Ongoing education to  help patient understand need for hospitalization  Outcome: Not Progressing  Goal: Patient will recognize inappropriate behaviors and develop alternative behaviors within 5 days  Description: Interventions:  - Patient in collaboration with Treatment Team will develop a behavior management plan to help identify effective coping skills to deal with stressors  Outcome: Not Progressing     Problem: Electroconvulsive therapy (ECT)  Goal: Verbalizes/displays adequate comfort level or baseline comfort level  Description: Interventions:  - Encourage patient to monitor pain and request assistance  - Assess pain using appropriate pain scale  - Administer analgesics based on type and severity of pain and evaluate response  - Implement non-pharmacological measures as appropriate and evaluate response  - Consider cultural and social influences on pain and pain management  - Notify physician/advanced practitioner if interventions unsuccessful or patient reports new pain  Outcome: Not Progressing  Goal: Achieves stable or improved neurological status  Description: INTERVENTIONS  - Monitor and report changes in neurological status  - Monitor vital signs such as temperature, blood pressure, glucose, and any other labs ordered   - Initiate measures to prevent increased intracranial pressure  - Monitor for seizure activity and implement precautions if appropriate      Outcome: Not Progressing  Goal: Achieves maximal functionality and self care  Description: INTERVENTIONS  - Monitor swallowing and airway patency with patient fatigue and changes in neurological status  - Encourage and assist patient to increase activity and self care.   - Encourage visually impaired, hearing impaired and aphasic patients to use assistive/communication devices  Outcome: Not Progressing  Goal: Maintain or return mobility to safest level of function  Description: INTERVENTIONS:  - Assess patient's ability to carry out ADLs; assess patient's baseline for ADL  function and identify physical deficits which impact ability to perform ADLs (bathing, care of mouth/teeth, toileting, grooming, dressing, etc.)  - Assess/evaluate cause of self-care deficits   - Assess range of motion  - Assess patient's mobility  - Assess patient's need for assistive devices and provide as appropriate  - Encourage maximum independence but intervene and supervise when necessary  - Involve family in performance of ADLs  - Assess for home care needs following discharge   - Consider OT consult to assist with ADL evaluation and planning for discharge  - Provide patient education as appropriate  Outcome: Not Progressing  Goal: Maintains adequate nutritional intake  Description: INTERVENTIONS:  - Monitor percentage of each meal consumed  - Identify factors contributing to decreased intake, treat as appropriate  - Assist with meals as needed  - Monitor I&O, weight, and lab values if indicated  - Obtain nutrition services referral as needed  Outcome: Not Progressing     Problem: DISCHARGE PLANNING - CARE MANAGEMENT  Goal: Discharge to post-acute care or home with appropriate resources  Description: INTERVENTIONS:  - Conduct assessment to determine patient/family and health care team treatment goals, and need for post-acute services based on payer coverage, community resources, and patient preferences, and barriers to discharge  - Address psychosocial, clinical, and financial barriers to discharge as identified in assessment in conjunction with the patient/family and health care team  - Arrange appropriate level of post-acute services according to patient’s   needs and preference and payer coverage in collaboration with the physician and health care team  - Communicate with and update the patient/family, physician, and health care team regarding progress on the discharge plan  - Arrange appropriate transportation to post-acute venues  Outcome: Not Progressing

## 2024-04-03 NOTE — NURSING NOTE
Patient  is alert and oriented. states he is tired from ect this am. Patient given nicotine gum. Had no other complaints.

## 2024-04-03 NOTE — ANESTHESIA PREPROCEDURE EVALUATION
Procedure:  ECT INPATIENT    Relevant Problems   CARDIO   (+) Primary hypertension      GI/HEPATIC   (+) GERD (gastroesophageal reflux disease)        Physical Exam    Airway    Mallampati score: II  TM Distance: >3 FB  Neck ROM: full     Dental       Cardiovascular  Cardiovascular exam normal    Pulmonary  Pulmonary exam normal     Other Findings        Anesthesia Plan  ASA Score- 2     Anesthesia Type- general with ASA Monitors.         Additional Monitors:     Airway Plan:            Plan Factors-    Chart reviewed.   Existing labs reviewed.                   Induction- intravenous.    Postoperative Plan-     Informed Consent- Anesthetic plan and risks discussed with patient.  I personally reviewed this patient with the CRNA. Discussed and agreed on the Anesthesia Plan with the CRNA..

## 2024-04-03 NOTE — NURSING NOTE
Patient departed from [facility] accompanied by staff for an ECT treatment. Vital signs within normal limits. Per Cynthia, PACU  his 9am dose of propanolol and norvasc were given with a small amount of water. Pre op checklist completed.  order in computer.

## 2024-04-03 NOTE — PROCEDURES
Procedure Note - ECT  Alberto Berumen 28 y.o. male MRN: [id number]    Time out was taken with staff to confirm correct patient and correct procedure to be performed.    Session Number: 0013    Diagnosis: Principal Problem:    Schizoaffective disorder, bipolar type (HCC)  Active Problems:    GERD (gastroesophageal reflux disease)    Medical clearance for psychiatric admission    Tobacco abuse    T wave inversion in EKG    Chronic idiopathic constipation    Confluent and reticulate papillomatosis    Primary hypertension    Elevated hemoglobin A1c      ECT Type: Inpatient    Anesthesia: Methohexital    Electrode Placement: Bilateral    Energy level:  100 %      Seizure Duration     EE Sec.    EMG : 16 Sec    Post-ictal Suppression Index: 72.9 %    Results:Clinical seizure was satisfactory, Patient tolerated ECT well     Media Information    Document Information    Clinical Image - Mobile Device   EEG   2024 07:56   Attached To:   Hospital Encounter on 3/29/24   Source Information    Ted Acevedo MD  Sh Eacbh     Vitals:    24 0747   BP: 153/92   Pulse: 93   Resp: 18   Temp: (!) 97.3 °F (36.3 °C)   SpO2: 96%        Medication Administration - last 24 hours from 2024 0754 to 2024 0754         Date/Time Order Dose Route Action Action by     2024 0533 EDT amLODIPine (NORVASC) tablet 5 mg 5 mg Oral Given Isabel Lama RN     2024 0848 EDT amLODIPine (NORVASC) tablet 5 mg 5 mg Oral Given Linsey Gibbons RN     2024 EDT atropine (ISOPTO ATROPINE) 1 % ophthalmic solution 1 drop 1 drop Sublingual Given Isabel Lama RN     2024 EDT cloZAPine (CLOZARIL) tablet 450 mg 450 mg Oral Given Isabel Lama RN     2024 0839 EDT escitalopram (LEXAPRO) tablet 20 mg 20 mg Oral Given Linsey Gibbons RN     2024 0840 EDT metFORMIN (GLUCOPHAGE) tablet 500 mg 500 mg Oral Given Linsey Gibbons RN     2024 EDT minocycline (MINOCIN) capsule 100 mg 100 mg Oral  Given Isabel Lama, MIGUEL     04/02/2024 0839 EDT minocycline (MINOCIN) capsule 100 mg 100 mg Oral Given Linsey Gibbons RN     04/02/2024 0852 EDT polyethylene glycol (MIRALAX) packet 17 g 17 g Oral Given Linsey Gibbons RN     04/03/2024 0531 EDT propranolol (INDERAL) tablet 10 mg 10 mg Oral Given Isabel Lama RN     04/02/2024 2117 EDT propranolol (INDERAL) tablet 10 mg 10 mg Oral Given Isabel Lama RN     04/02/2024 0848 EDT propranolol (INDERAL) tablet 10 mg 10 mg Oral Given Linsey Gibbons RN     04/02/2024 1728 EDT senna-docusate sodium (SENOKOT S) 8.6-50 mg per tablet 2 tablet 2 tablet Oral Given Linsey Gibbons RN     04/02/2024 0839 EDT senna-docusate sodium (SENOKOT S) 8.6-50 mg per tablet 2 tablet 2 tablet Oral Given Linsey Gibbons RN     04/02/2024 2116 EDT melatonin tablet 3 mg 3 mg Oral Given Isabel Lama RN     04/02/2024 2124 EDT nicotine polacrilex (NICORETTE) gum 2 mg 2 mg Oral Given Isabel Lama RN     04/02/2024 1738 EDT nicotine polacrilex (NICORETTE) gum 2 mg 2 mg Oral Given Carlie Hadley RN     04/02/2024 1525 EDT nicotine polacrilex (NICORETTE) gum 2 mg 2 mg Oral Given Linsey Gibbons RN     04/02/2024 1030 EDT cyanocobalamin injection 1,000 mcg 1,000 mcg Intramuscular Given Linsey Gibbons RN     04/02/2024 1218 EDT ergocalciferol (VITAMIN D2) capsule 50,000 Units 50,000 Units Oral Given Linsey Gibbons RN     04/03/2024 0734 EDT lactated ringers infusion 0 mL/kg/hr Intravenous Stopped Cynthia Montenegro, RN     04/03/2024 0707 EDT lactated ringers infusion -- Intravenous Anesthesia Volume Adjustment Anjel Arriaza, CHRISTIAN     04/03/2024 0615 EDT lactated ringers infusion -- Intravenous New Bag Anjel Arriaza, CRNA

## 2024-04-04 ENCOUNTER — ANESTHESIA EVENT (OUTPATIENT)
Dept: ANESTHESIOLOGY | Facility: HOSPITAL | Age: 29
End: 2024-04-04

## 2024-04-04 ENCOUNTER — ANESTHESIA (OUTPATIENT)
Dept: ANESTHESIOLOGY | Facility: HOSPITAL | Age: 29
End: 2024-04-04

## 2024-04-04 PROCEDURE — 99232 SBSQ HOSP IP/OBS MODERATE 35: CPT | Performed by: PSYCHIATRY & NEUROLOGY

## 2024-04-04 RX ORDER — SODIUM CHLORIDE, SODIUM LACTATE, POTASSIUM CHLORIDE, CALCIUM CHLORIDE 600; 310; 30; 20 MG/100ML; MG/100ML; MG/100ML; MG/100ML
50 INJECTION, SOLUTION INTRAVENOUS CONTINUOUS
Status: CANCELLED | OUTPATIENT
Start: 2024-04-04

## 2024-04-04 RX ORDER — SODIUM CHLORIDE, SODIUM LACTATE, POTASSIUM CHLORIDE, CALCIUM CHLORIDE 600; 310; 30; 20 MG/100ML; MG/100ML; MG/100ML; MG/100ML
50 INJECTION, SOLUTION INTRAVENOUS CONTINUOUS
Status: DISCONTINUED | OUTPATIENT
Start: 2024-04-04 | End: 2024-04-08

## 2024-04-04 RX ADMIN — NICOTINE POLACRILEX 2 MG: 2 GUM, CHEWING BUCCAL at 08:51

## 2024-04-04 RX ADMIN — PROPRANOLOL HYDROCHLORIDE 10 MG: 10 TABLET ORAL at 21:13

## 2024-04-04 RX ADMIN — ESCITALOPRAM OXALATE 20 MG: 10 TABLET, FILM COATED ORAL at 08:51

## 2024-04-04 RX ADMIN — METFORMIN HYDROCHLORIDE 500 MG: 500 TABLET ORAL at 08:50

## 2024-04-04 RX ADMIN — PROPRANOLOL HYDROCHLORIDE 10 MG: 10 TABLET ORAL at 08:52

## 2024-04-04 RX ADMIN — MINOCYCLINE HYDROCHLORIDE 100 MG: 100 CAPSULE ORAL at 08:51

## 2024-04-04 RX ADMIN — NICOTINE POLACRILEX 2 MG: 2 GUM, CHEWING BUCCAL at 21:13

## 2024-04-04 RX ADMIN — SENNOSIDES AND DOCUSATE SODIUM 2 TABLET: 8.6; 5 TABLET ORAL at 17:06

## 2024-04-04 RX ADMIN — SENNOSIDES AND DOCUSATE SODIUM 2 TABLET: 8.6; 5 TABLET ORAL at 08:51

## 2024-04-04 RX ADMIN — CYANOCOBALAMIN TAB 1000 MCG 1000 MCG: 1000 TAB at 08:50

## 2024-04-04 RX ADMIN — NICOTINE POLACRILEX 2 MG: 2 GUM, CHEWING BUCCAL at 12:43

## 2024-04-04 RX ADMIN — CLOZAPINE 450 MG: 25 TABLET ORAL at 21:13

## 2024-04-04 RX ADMIN — MELATONIN TAB 3 MG 3 MG: 3 TAB at 21:14

## 2024-04-04 RX ADMIN — AMLODIPINE BESYLATE 5 MG: 5 TABLET ORAL at 08:51

## 2024-04-04 RX ADMIN — NICOTINE POLACRILEX 2 MG: 2 GUM, CHEWING BUCCAL at 17:14

## 2024-04-04 RX ADMIN — ATROPINE SULFATE 1 DROP: 10 SOLUTION/ DROPS OPHTHALMIC at 21:13

## 2024-04-04 RX ADMIN — MINOCYCLINE HYDROCHLORIDE 100 MG: 100 CAPSULE ORAL at 21:14

## 2024-04-04 RX ADMIN — POLYETHYLENE GLYCOL 3350 17 G: 17 POWDER, FOR SOLUTION ORAL at 08:50

## 2024-04-04 NOTE — PROGRESS NOTES
04/04/24 0709   Team Meeting   Meeting Type Daily Rounds   Team Members Present   Team Members Present Physician;Nurse;;Other (Discipline and Name)   Patient/Family Present   Patient Present No   Patient's Family Present No     In attendance:  Dr. Alex Thomas, MD Dr. Jordan Holter, DO Eveline Hunt, HOLLI Taylor, RN  Luisana Alvarado, [facility]  Carla Lux, W  MATILDA Daniel.S.    Groups: 1/8    Pt completed 14th ECT session yesterday; pt reports feeling tired and confused after treatment. Pt is pleasant and cooperative otherwise. No bx issues noted. Pt still complains of ongoing AH.

## 2024-04-04 NOTE — PLAN OF CARE
Problem: Alteration in Thoughts and Perception  Goal: Treatment Goal: Gain control of psychotic behaviors/thinking, reduce/eliminate presenting symptoms and demonstrate improved reality functioning upon discharge  Outcome: Progressing  Goal: Verbalize thoughts and feelings  Description: Interventions:  - Promote a nonjudgmental and trusting relationship with the patient through active listening and therapeutic communication  - Assess patient's level of functioning, behavior and potential for risk  - Engage patient in 1 on 1 interactions  - Encourage patient to express fears, feelings, frustrations, and discuss symptoms    - [place] patient to reality, help patient recognize reality-based thinking   - Administer medications as ordered and assess for potential side effects  - Provide the patient education related to the signs and symptoms of the illness and desired effects of prescribed medications  Outcome: Progressing  Goal: Refrain from acting on delusional thinking/internal stimuli  Description: Interventions:  - Monitor patient closely, per order   - Utilize least restrictive measures   - Set reasonable limits, give positive feedback for acceptable   - Administer medications as ordered and monitor of potential side effects  Outcome: Progressing  Goal: Agree to be compliant with medication regime, as prescribed and report medication side effects  Description: Interventions:  - Offer appropriate PRN medication and supervise ingestion; conduct AIMS, as needed   Outcome: Progressing     Problem: Depression  Goal: Refrain from harming self  Description: Interventions:  - Monitor patient closely, per order   - Supervise medication ingestion, monitor effects and side effects   Outcome: Progressing     Problem: Anxiety  Goal: Anxiety is at manageable level  Description: Interventions:  - Assess and monitor patient's anxiety level.   - Monitor for signs and symptoms (heart palpitations, chest pain, shortness of breath,  headaches, nausea, feeling jumpy, restlessness, irritable, apprehensive).   - Collaborate with interdisciplinary team and initiate plan and interventions as ordered.  - [place] patient to unit/surroundings  - Explain treatment plan  - Encourage participation in care  - Encourage verbalization of concerns/fears  - Identify coping mechanisms  - Assist in developing anxiety-reducing skills  - Administer/offer alternative therapies  - Limit or eliminate stimulants  Outcome: Progressing     Problem: Alteration in Orientation  Goal: Treatment Goal: Demonstrate a reduction of confusion and improved orientation to person, place, time and/or situation upon discharge, according to optimum baseline/ability  Outcome: Progressing  Goal: Interact with staff daily  Description: Interventions:  - Assess and re-assess patient's level of orientation  - Engage patient in 1 on 1 interactions, daily, for a minimum of 15 minutes   - Establish rapport/trust with patient   Outcome: Progressing  Goal: Cooperate with recommended testing/procedures  Description: Interventions:  - Determine need for ancillary testing  - Observe for mental status changes  - Implement falls/precaution protocol   Outcome: Progressing     Problem: Electroconvulsive therapy (ECT)  Goal: Absence of urinary retention  Description: INTERVENTIONS:  - Assess patient’s ability to void and empty bladder  - Monitor I/O  - Bladder scan as needed  - Discuss with physician/AP medications to alleviate retention as needed  - Discuss catheterization for long term situations as appropriate  Outcome: Progressing  Goal: Minimal or absence of nausea and/or vomiting  Description: INTERVENTIONS:  - Administer IV fluids if ordered to ensure adequate hydration  - Maintain NPO status until nausea and vomiting are resolved  - Nasogastric tube if ordered  - Administer ordered antiemetic medications as needed  - Provide nonpharmacologic comfort measures as appropriate  - Advance diet as  tolerated, if ordered  - Consider nutrition services referral to assist patient with adequate nutrition and appropriate food choices  Outcome: Progressing  Goal: Maintains adequate nutritional intake  Description: INTERVENTIONS:  - Monitor percentage of each meal consumed  - Identify factors contributing to decreased intake, treat as appropriate  - Assist with meals as needed  - Monitor I&O, weight, and lab values if indicated  - Obtain nutrition services referral as needed  Outcome: Progressing     Problem: Alteration in Thoughts and Perception  Goal: Attend and participate in unit activities, including therapeutic, recreational, and educational groups  Description: Interventions:  -Encourage Visitation and family involvement in care  Outcome: Not Progressing     Problem: Depression  Goal: Refrain from isolation  Description: Interventions:  - Develop a trusting relationship   - Encourage socialization   Outcome: Not Progressing  Goal: Attend and participate in unit activities, including therapeutic, recreational, and educational groups  Description: Interventions:  - Provide therapeutic and educational activities daily, encourage attendance and participation, and document same in the medical record   Outcome: Not Progressing

## 2024-04-04 NOTE — ANESTHESIA PREPROCEDURE EVALUATION
Procedure:  PRE-OP ONLY    Relevant Problems   CARDIO   (+) Primary hypertension      GI/HEPATIC   (+) GERD (gastroesophageal reflux disease)      Behavioral Health   (+) Schizoaffective disorder, bipolar type (HCC)   (+) Tobacco abuse      Blood   (+) Elevated hemoglobin A1c      Dermatology   (+) Syringoma      Other   (+) Class 2 obesity in adult        Physical Exam    Airway    Mallampati score: II  TM Distance: >3 FB  Neck ROM: full     Dental       Cardiovascular  Cardiovascular exam normal    Pulmonary  Pulmonary exam normal     Other Findings        Anesthesia Plan  ASA Score- 2     Anesthesia Type- general with ASA Monitors.         Additional Monitors:     Airway Plan:            Plan Factors-Exercise tolerance (METS): >4 METS.    Chart reviewed. EKG reviewed.  Existing labs reviewed. Patient summary reviewed.                  Induction- intravenous.    Postoperative Plan-     Informed Consent- Anesthetic plan and risks discussed with patient.  I personally reviewed this patient with the CRNA. Discussed and agreed on the Anesthesia Plan with the CRNA..              Lab Results   Component Value Date    HGBA1C 5.0 04/01/2024       Lab Results   Component Value Date    K 4.1 04/01/2024     04/01/2024    CO2 27 04/01/2024    BUN 12 04/01/2024    CREATININE 0.90 04/01/2024    GLUF 97 01/11/2024    CALCIUM 9.3 04/01/2024    AST 23 04/01/2024    ALT 53 (H) 04/01/2024    ALKPHOS 84 04/01/2024    EGFR 116 04/01/2024       Lab Results   Component Value Date    WBC 9.45 04/01/2024    HGB 12.9 04/01/2024    HCT 41.8 04/01/2024    MCV 78 (L) 04/01/2024     04/01/2024     Sinus tachycardia  Nonspecific T wave abnormality  Abnormal ECG  When compared with ECG of 11-MAR-2024 15:41, (unconfirmed)  Sinus rhythm has replaced Ectopic atrial rhythm  QRS axis Shifted left  Confirmed by Angel Lara ([id number]) on 3/11/2024 5:11:48 PM      Specimen Collected: 03/11/24 15:42

## 2024-04-04 NOTE — PROGRESS NOTES
Psychiatry Progress Note [facility]    Alberto Berumen 28 y.o. male MRN: [id number]  Unit/Bed#: [facility] 107-01 Encounter: [id number]  Code Status: Level 1 - Full Code    PCP: Joce Juan MD    Date of Admission:  3/29/2024 2008   Date of Service:  04/04/24    Patient Active Problem List   Diagnosis    GERD (gastroesophageal reflux disease)    Medical clearance for psychiatric admission    Schizoaffective disorder, bipolar type (HCC)    Tobacco abuse    T wave inversion in EKG    Syringoma    Chronic idiopathic constipation    Vitamin B 12 deficiency    Vitamin D deficiency    Confluent and reticulate papillomatosis    Class 2 obesity in adult    Primary hypertension    Elevated hemoglobin A1c         Review of systems: Unremarkable   diagnosis: Schizoaffective bipolar    Assessment  Overall Status: Had ECT #13 yesterday rescheduled for another 1 tomorrow followed by weekly ECT for maintenance treatment and he states it has been helpful with the medication.  Still hears the same voices as before they are threatening to kill him and his family and is basically in bed yesterday after the ECT claiming he was tired  certification Statement: The patient will continue to require additional inpatient hospital stay due to ongoing voices that are threatening to kill him and his family lack of response to medications and ECT so far.  Still hears voices that are threatening but not commanding.  His aunt is now planning to move out to [place] and therefore he needs to possibly go into a supervised setting like a group home     Medications: Clozapine 450 mg at bedtime, propranolol 10 mg every 12 hours as as needed for anxiety, Lexapro 20 mg once a day, atropine eyedrops sublingual for drooling of saliva and senna 2 tablets twice a day for constipation  Or medications reviewed and I recommend they be continued for symptom management  Side effects from treatment: None reported  Medication changes   None at  this time but will need to titrate Clozapine depending on blood work   Medication education   Risks side effects benefits and precautions of medications discussed with patient and he did verbalize an understanding about risks for metabolic syndrome from being on neuroleptics and is form tardive dyskinesia etc. and special precautions about being on clozapine   Understanding of medications: Has some understanding   Justification for dual anti-psychotics: Not applicable    Non-pharmacological treatments  Continue with individual, group, milieu and occupational therapy using recovery principles and psycho-education about accepting illness and the need for treatment.   to contact aunt and explore ACT team referral which he never had  ECT to be continued 2 more times on Wed & Friday for a total of 15 followed by weekly maintenance ECTs    Safety  Safety and communication plan established to target dynamic risk factors discussed above.    Discharge Plan   Back to his aunt with an ACT team    Interval Progress   Had ECT #13 yesterday and is supposed to get #14 tomorrow.  With good maintenance weekly ECTs afterwards for 15 more sessions if needed.  Tolerating Clozapine with no major issues or complaints but still with the same voices that are threatening and he states they are less often.  Still with some underlying paranoia.  Was basically in bed all day yesterday after ECT claiming he was tired.  Not aggressive or agitated or self-abusive or threatening or destructive and has not needed any behavioral PRNs on the unit.  Does eat meals  Acceptance by patient: accepting  Hopefulness in recovery: Living with his aunt but she may be moving to [place]  Involved in reintegration process: Talking with his aunt and sister  Trusting in relationship with psychiatrist: Beginning to trust  Sleep: Good  Appetite: Good  Compliance with Medications: Compliant  Group attendance: Very few 1/8  Significant events: Adjusting  to the unit, had ECT #14 yesterday and was basically in bed all day and still with same paranoia and threatening voices      Mental Status Exam  Appearance: age appropriate, improved grooming, looks older than stated age, overweight, with hair and dreadlocks casually dressed fairly groomed with good eye contact again found laying on bed under the covers but did get up when approached  Behavior: cooperative, mildly anxious, evasive, gesturing, slow responses  Speech: normal rate, normal volume, normal pitch  Mood: dysphoric, anxious, reports he feels good  Affect: constricted, inappropriate, mood-congruent  Thought Process: organized, logical, coherent, goal directed, linear, decreased rate of thoughts, slowing of thoughts, negative thinking, impaired abstract reasoning, concrete  Thought Content: paranoid ideation, some paranoia, intrusive thoughts, preoccupied, chronic  Perceptual Disturbances: auditory hallucinations, appears preoccupied, appears responding to internal stimuli, not observed, reports voices tell him that he and his family are going to be killed sometimes from 1 person on more than one person talking to him.  No current suicidal or homicidal thoughts and no plans verbalized.  No phobias obsessions compulsions or distorted body perceptions reported.  Hx Risk Factors: chronic psychiatric problems, chronic anxiety symptoms, chronic psychotic symptoms, his aunt possibly moving away  Sensorium: Alert and oriented x 3 spheres   cognition: recent and remote memory grossly intact  Consciousness: alert and awake  Attention: attention span and concentration are age appropriate  Intellect: appears to be of average intelligence  Insight: intact  Judgement: intact  Motor Activity: no abnormal movements     Vitals  Temp:  [97.3 °F (36.3 °C)-97.8 °F (36.6 °C)] 97.8 °F (36.6 °C)  HR:  [] 99  Resp:  [16-26] 18  BP: (116-214)/() 142/89  SpO2:  [96 %-100 %] 100 %    Intake/Output Summary (Last 24 hours) at  4/4/2024 0526  Last data filed at 4/3/2024 0707  Gross per 24 hour   Intake 500 ml   Output --   Net 500 ml       Lab Results: All Labs For Current Hospital Admission Reviewed ANC on 4/2/24 was 5650, rest of labs OK but Vit D level is  low      Current Facility-Administered Medications   Medication Dose Route Frequency Provider Last Rate    acetaminophen  650 mg Oral Q6H PRN [facility] Cresencioter, DO      acetaminophen  650 mg Oral Q4H PRN [facility] Cresencioter, DO      acetaminophen  975 mg Oral Q6H PRN [facility] Cresencioter, DO      aluminum-magnesium hydroxide-simethicone  30 mL Oral Q4H PRN [facility] Holter, DO      amLODIPine  5 mg Oral Daily HOLLI Lion      Artificial Tears  1 drop Both Eyes Q3H PRN Jordan C Holter, DO      atropine  1 drop Sublingual HS HOLLI Lion      atropine  1 drop Sublingual Daily PRN HOLLI Lion      benztropine  1 mg Intramuscular Q4H PRN Max 6/day Ion C Holter, DO      benztropine  1 mg Oral Q4H PRN Max 6/day HOLLI Lion      benztropine  1 mg Oral Q4H PRN Max 6/day Ion  Holter, DO      cloZAPine  450 mg Oral HS HOLLI Lion      cyanocobalamin  1,000 mcg Oral Daily HOLLI Galvan      hydrOXYzine HCL  50 mg Oral Q6H PRN Max 4/day Jordan C Holter, DO      Or    diphenhydrAMINE  50 mg Intramuscular Q6H PRN Jordan C Holter, DO      diphenhydrAMINE-zinc acetate   Topical BID PRN HOLLI Lion      ergocalciferol  50,000 Units Oral Weekly HOLLI Galvan      escitalopram  20 mg Oral Daily HOLLI Lion      hydrocortisone   Topical 4x Daily PRN HOLLI Lion      hydrOXYzine HCL  100 mg Oral Q6H PRN Max 4/day Jordan C Holter, DO      Or    LORazepam  2 mg Intramuscular Q6H PRN [facility] Holter, DO      hydrOXYzine HCL  25 mg Oral Q6H PRN Max 4/day [facility] Holter, DO      ibuprofen  600 mg Oral Q8H PRN HOLLI Lion      melatonin  3 mg Oral HS [facility] Holter, DO      metFORMIN  500 mg Oral Daily With Breakfast Eveline  HOLLI Hunt      methocarbamol  500 mg Oral Q6H PRN HOLLI Lion      minocycline  100 mg Oral Q12H [facility] HOLLI Lion      nicotine polacrilex  2 mg Oral Q2H PRN [facility] Holter, DO      OLANZapine  5 mg Oral Q4H PRN Max 3/day [facility] Holter, DO      Or    OLANZapine  2.5 mg Intramuscular Q4H PRN Max 3/day [facility] Holter, DO      OLANZapine  5 mg Oral Q3H PRN Max 3/day [facility] Holter, DO      Or    OLANZapine  5 mg Intramuscular Q3H PRN Max 3/day [facility] Holter, DO      OLANZapine  2.5 mg Oral Q4H PRN Max 6/day [facility] Holter, DO      ondansetron  4 mg Oral Q6H PRN HOLLI Lion      polyethylene glycol  17 g Oral Daily HOLLI Lion      polyethylene glycol  17 g Oral Daily PRN [facility] Holter, DO      propranolol  10 mg Oral Q12H [facility] HOLLI Lion      senna-docusate sodium  1 tablet Oral Daily PRN [facility] Holter, DO      senna-docusate sodium  2 tablet Oral BID HOLLI Lion      white petrolatum-mineral oil   Topical TID PRN HOLLI Lion         Counseling / Coordination of Care: Total floor / unit time spent today 15 minutes. Greater than 50% of total time was spent with the patient and / or family counseling and / or somewhat receptive to supportive listening and teaching positive coping skills to deal with symptom mangement.     Patient's Rights, confidentiality and exceptions to confidentiality, use of automated medical record, Behavioral Health Services staff access to medical record, and consent to treatment reviewed.    This note has been dictated and hence there may be problems with punctuation, spelling and formatting and if anyone has any concerns please address them to Dr. Rosario   This note is not shared with patient due to potential for making patient's condition worse by knowing the content of the note.

## 2024-04-04 NOTE — NURSING NOTE
Patient remains compliant with medication and meals He only attended 1 group on Wednesday He spends a lot of time in his room patient states he continues to hear voices He is going to E.C.T. tomorrow.  When out of  his room he is pleasant and co operative.

## 2024-04-05 ENCOUNTER — ANESTHESIA EVENT (INPATIENT)
Dept: PREOP/PACU | Facility: HOSPITAL | Age: 29
End: 2024-04-05
Payer: COMMERCIAL

## 2024-04-05 ENCOUNTER — APPOINTMENT (INPATIENT)
Dept: PREOP/PACU | Facility: HOSPITAL | Age: 29
DRG: 750 | End: 2024-04-05
Attending: PSYCHIATRY & NEUROLOGY
Payer: COMMERCIAL

## 2024-04-05 ENCOUNTER — ANESTHESIA (INPATIENT)
Dept: PREOP/PACU | Facility: HOSPITAL | Age: 29
End: 2024-04-05
Payer: COMMERCIAL

## 2024-04-05 PROCEDURE — GZB2ZZZ ELECTROCONVULSIVE THERAPY, BILATERAL-SINGLE SEIZURE: ICD-10-PCS | Performed by: STUDENT IN AN ORGANIZED HEALTH CARE EDUCATION/TRAINING PROGRAM

## 2024-04-05 PROCEDURE — 90870 ELECTROCONVULSIVE THERAPY: CPT

## 2024-04-05 PROCEDURE — 90870 ELECTROCONVULSIVE THERAPY: CPT | Performed by: STUDENT IN AN ORGANIZED HEALTH CARE EDUCATION/TRAINING PROGRAM

## 2024-04-05 RX ORDER — LABETALOL HYDROCHLORIDE 5 MG/ML
INJECTION, SOLUTION INTRAVENOUS AS NEEDED
Status: DISCONTINUED | OUTPATIENT
Start: 2024-04-05 | End: 2024-04-05

## 2024-04-05 RX ORDER — GLYCOPYRROLATE 0.2 MG/ML
INJECTION INTRAMUSCULAR; INTRAVENOUS AS NEEDED
Status: DISCONTINUED | OUTPATIENT
Start: 2024-04-05 | End: 2024-04-05

## 2024-04-05 RX ORDER — SODIUM CHLORIDE, SODIUM LACTATE, POTASSIUM CHLORIDE, CALCIUM CHLORIDE 600; 310; 30; 20 MG/100ML; MG/100ML; MG/100ML; MG/100ML
100 INJECTION, SOLUTION INTRAVENOUS CONTINUOUS
Status: DISCONTINUED | OUTPATIENT
Start: 2024-04-05 | End: 2024-04-05

## 2024-04-05 RX ORDER — SODIUM CHLORIDE, SODIUM LACTATE, POTASSIUM CHLORIDE, CALCIUM CHLORIDE 600; 310; 30; 20 MG/100ML; MG/100ML; MG/100ML; MG/100ML
125 INJECTION, SOLUTION INTRAVENOUS CONTINUOUS
Status: DISCONTINUED | OUTPATIENT
Start: 2024-04-05 | End: 2024-04-05

## 2024-04-05 RX ORDER — ESMOLOL HYDROCHLORIDE 10 MG/ML
INJECTION INTRAVENOUS AS NEEDED
Status: DISCONTINUED | OUTPATIENT
Start: 2024-04-05 | End: 2024-04-05

## 2024-04-05 RX ORDER — METHOHEXITAL IN WATER/PF 100MG/10ML
SYRINGE (ML) INTRAVENOUS AS NEEDED
Status: DISCONTINUED | OUTPATIENT
Start: 2024-04-05 | End: 2024-04-05

## 2024-04-05 RX ORDER — ONDANSETRON 2 MG/ML
4 INJECTION INTRAMUSCULAR; INTRAVENOUS ONCE AS NEEDED
Status: DISCONTINUED | OUTPATIENT
Start: 2024-04-05 | End: 2024-04-05

## 2024-04-05 RX ORDER — ALBUTEROL SULFATE 0.83 MG/ML
2.5 SOLUTION RESPIRATORY (INHALATION) ONCE AS NEEDED
Status: DISCONTINUED | OUTPATIENT
Start: 2024-04-05 | End: 2024-04-05

## 2024-04-05 RX ORDER — SUCCINYLCHOLINE/SOD CL,ISO/PF 100 MG/5ML
SYRINGE (ML) INTRAVENOUS AS NEEDED
Status: DISCONTINUED | OUTPATIENT
Start: 2024-04-05 | End: 2024-04-05

## 2024-04-05 RX ORDER — KETOROLAC TROMETHAMINE 30 MG/ML
INJECTION, SOLUTION INTRAMUSCULAR; INTRAVENOUS AS NEEDED
Status: DISCONTINUED | OUTPATIENT
Start: 2024-04-05 | End: 2024-04-05

## 2024-04-05 RX ORDER — LIDOCAINE HYDROCHLORIDE 10 MG/ML
0.5 INJECTION, SOLUTION EPIDURAL; INFILTRATION; INTRACAUDAL; PERINEURAL ONCE AS NEEDED
Status: DISCONTINUED | OUTPATIENT
Start: 2024-04-05 | End: 2024-04-05

## 2024-04-05 RX ADMIN — KETOROLAC TROMETHAMINE 30 MG: 30 INJECTION, SOLUTION INTRAMUSCULAR; INTRAVENOUS at 06:53

## 2024-04-05 RX ADMIN — MINOCYCLINE HYDROCHLORIDE 100 MG: 100 CAPSULE ORAL at 21:20

## 2024-04-05 RX ADMIN — ESCITALOPRAM OXALATE 20 MG: 10 TABLET, FILM COATED ORAL at 08:38

## 2024-04-05 RX ADMIN — ATROPINE SULFATE 1 DROP: 10 SOLUTION/ DROPS OPHTHALMIC at 21:20

## 2024-04-05 RX ADMIN — LABETALOL 20 MG/4 ML (5 MG/ML) INTRAVENOUS SYRINGE 10 MG: at 06:49

## 2024-04-05 RX ADMIN — Medication 120 MG: at 06:48

## 2024-04-05 RX ADMIN — LABETALOL 20 MG/4 ML (5 MG/ML) INTRAVENOUS SYRINGE 10 MG: at 06:53

## 2024-04-05 RX ADMIN — NICOTINE POLACRILEX 2 MG: 2 GUM, CHEWING BUCCAL at 12:36

## 2024-04-05 RX ADMIN — NICOTINE POLACRILEX 2 MG: 2 GUM, CHEWING BUCCAL at 21:41

## 2024-04-05 RX ADMIN — SENNOSIDES AND DOCUSATE SODIUM 2 TABLET: 8.6; 5 TABLET ORAL at 17:13

## 2024-04-05 RX ADMIN — METFORMIN HYDROCHLORIDE 500 MG: 500 TABLET ORAL at 08:37

## 2024-04-05 RX ADMIN — PROPRANOLOL HYDROCHLORIDE 10 MG: 10 TABLET ORAL at 21:20

## 2024-04-05 RX ADMIN — Medication 50 MG: at 06:51

## 2024-04-05 RX ADMIN — AMLODIPINE BESYLATE 5 MG: 5 TABLET ORAL at 05:55

## 2024-04-05 RX ADMIN — PROPRANOLOL HYDROCHLORIDE 10 MG: 10 TABLET ORAL at 05:52

## 2024-04-05 RX ADMIN — SODIUM CHLORIDE, SODIUM LACTATE, POTASSIUM CHLORIDE, AND CALCIUM CHLORIDE: .6; .31; .03; .02 INJECTION, SOLUTION INTRAVENOUS at 06:28

## 2024-04-05 RX ADMIN — NICOTINE POLACRILEX 2 MG: 2 GUM, CHEWING BUCCAL at 19:35

## 2024-04-05 RX ADMIN — MINOCYCLINE HYDROCHLORIDE 100 MG: 100 CAPSULE ORAL at 08:38

## 2024-04-05 RX ADMIN — CYANOCOBALAMIN TAB 1000 MCG 1000 MCG: 1000 TAB at 08:38

## 2024-04-05 RX ADMIN — CLOZAPINE 450 MG: 25 TABLET ORAL at 21:20

## 2024-04-05 RX ADMIN — MELATONIN TAB 3 MG 3 MG: 3 TAB at 21:20

## 2024-04-05 RX ADMIN — NICOTINE POLACRILEX 2 MG: 2 GUM, CHEWING BUCCAL at 08:39

## 2024-04-05 RX ADMIN — NICOTINE POLACRILEX 2 MG: 2 GUM, CHEWING BUCCAL at 17:21

## 2024-04-05 RX ADMIN — POLYETHYLENE GLYCOL 3350 17 G: 17 POWDER, FOR SOLUTION ORAL at 08:39

## 2024-04-05 RX ADMIN — GLYCOPYRROLATE 0.4 MG: 0.2 INJECTION INTRAMUSCULAR; INTRAVENOUS at 06:44

## 2024-04-05 RX ADMIN — SENNOSIDES AND DOCUSATE SODIUM 2 TABLET: 8.6; 5 TABLET ORAL at 08:37

## 2024-04-05 RX ADMIN — Medication 140 MG: at 06:48

## 2024-04-05 NOTE — NURSING NOTE
Appetite good. Visible for meals and snack only.  Suspicious affect.  Denies auditory hallucinations. Admits feeling anxious with mild depression but did not rate these on a scale. Guarded.  Denies suicidal ideations.  Admits having continuous racing thoughts. Support offered. No group attendance.  Encouraged to attend groups as part of his treatment plan and expectations. Medication compliant. For am ECT # 15 in am. To be NPO after Midnight. PACU notified and he is on the schedule.

## 2024-04-05 NOTE — MALNUTRITION/BMI
This medical record reflects one or more clinical indicators suggestive of malnutrition and/or morbid obesity.    Malnutrition Findings:                                 BMI Findings:  Adult BMI Classifications: Morbid Obesity 40-44.9        Body mass index is 41.32 kg/m².     See Nutrition note dated 4/5/2024 for additional details.  Completed nutrition assessment is viewable in the nutrition documentation.

## 2024-04-05 NOTE — ANESTHESIA POSTPROCEDURE EVALUATION
Post-Op Assessment Note    CV Status:  Stable    Pain management: satisfactory to patient       Mental Status:  Sleepy   Hydration Status:  Stable   PONV Controlled:  None   Airway Patency:  Patent     Post Op Vitals Reviewed: Yes    No anethesia notable event occurred.    Staff: CRNA               BP   143/92   Temp   97.6   Pulse  98   Resp   14   SpO2   98

## 2024-04-05 NOTE — PROGRESS NOTES
Psychiatry Progress Note [facility]    Alberto Berumen 28 y.o. male MRN: [id number]  Unit/Bed#: [facility] 107-01 Encounter: [id number]  Code Status: Level 1 - Full Code    PCP: Joce Juan MD    Date of Admission:  3/29/2024 2008   Date of Service:  04/05/24    Patient Active Problem List   Diagnosis    GERD (gastroesophageal reflux disease)    Medical clearance for psychiatric admission    Schizoaffective disorder, bipolar type (HCC)    Tobacco abuse    T wave inversion in EKG    Syringoma    Chronic idiopathic constipation    Vitamin B 12 deficiency    Vitamin D deficiency    Confluent and reticulate papillomatosis    Class 2 obesity in adult    Primary hypertension    Elevated hemoglobin A1c     Review of systems: Unremarkable  diagnosis: Schizoaffective bipolar    Assessment  Overall Status: Patient had ECT #15 today.  Refusing groups preferring to lay back on bed in his room most of the time.  Continues to claim to hear same voices that are threatening.  Says he will try to talk to his aunt to see if she is moving away to [place]  Certification Statement: The patient will continue to require additional inpatient hospital stay due to ongoing voices that are threatening to kill him and his family lack of response to medications and ECT so far.  Still hears voices that are threatening but not commanding.  His aunt is now planning to move out to [place] and therefore he needs to possibly go into a supervised setting like a group home     Medications: Clozapine 450 mg at bedtime, propranolol 10 mg every 12 hours as as needed for anxiety, Lexapro 20 mg once a day, atropine eyedrops sublingual for drooling of saliva and senna 2 tablets twice a day for constipation  All medications reviewed and I recommend they  be continued for symptom management   side effects from treatment: None reported  Medication changes   None at this time    Medication education   Risks side effects benefits and  precautions of medications discussed with patient and he did verbalize an understanding about risks for metabolic syndrome from being on neuroleptics and is form tardive dyskinesia etc. and special precautions about being on clozapine   Understanding of medications: Has some understanding   Justification for dual anti-psychotics: Not applicable    Non-pharmacological treatments  Continue with individual, group, milieu and occupational therapy using recovery principles and psycho-education about accepting illness and the need for treatment.   to contact aunt and explore ACT team referral which he never had  ECT to be continued 2 more times on Wed & Friday for a total of 15 followed by weekly maintenance ECTs    Safety  Safety and communication plan established to target dynamic risk factors discussed above.    Discharge Plan   Back to his aunt with an ACT team    Interval Progress   Had ECT #14 today and and is supposed to get maintenance ECTs from 2 weeks on words if necessary.  Claims the combination of clozapine and recently has helped to lower the intensity and frequency of the voices but still hears them threatening to kill him and his family.  He is basically staying on his bed in his room hardly coming out or attending groups but does eat his meals.  Still with some underlying paranoia and preoccupation with minimal interaction with peers.  He says he is trying to talk to his aunt to find out if she is going to move to [place].  Has not been aggressive or agitated or threatening or self-abusive and has not needed any as needed behavioral medications   acceptance by patient: Accepting  Hopefulness in recovery: Living with his aunt who is supposedly moving to [place]  Involved in reintegration process: Talking with aunt and sister  Trusting in relationship with psychiatrist: Beginning to trust  Sleep: Good  Appetite: Good  Compliance with Medications: Compliant  Group attendance: 0/9  Significant  events: Had ECT #14 today and basically in bed most of the time and still with same voices and paranoia as before    Mental Status Exam  Appearance: age appropriate, improved grooming, looks older than stated age, overweight, with hair and dreadlocks casually dressed fairly groomed with good eye contact again found resting on bed under the covers but was easily awakened   behavior: cooperative, mildly anxious, evasive, gesturing, slow responses preoccupied internally attuned  Speech: normal rate, normal volume, normal pitch  Mood: dysphoric, anxious, reports he feels good  Affect: constricted, inappropriate, mood-congruent  Thought Process: organized, logical, coherent, goal directed, linear, decreased rate of thoughts, slowing of thoughts, negative thinking, impaired abstract reasoning, concrete  Thought Content: paranoid ideation, some paranoia, intrusive thoughts, preoccupied, chronic no current suicidal or homicidal thoughts and no plans verbalized.  No other delusional experiences elicited.  No phobias obsessions compulsions or distorted body perceptions reported.  Appears internally preoccupied paranoid suspicious  Perceptual Disturbances: auditory hallucinations, appears preoccupied, appears responding to internal stimuli, not observed, reports voices tell him that he and his family are going to be killed sometimes from 1 person on more than one person talking to him claiming he has not seen much difference other than a little bit subdued with the ECT.  No current suicidal or homicidal thoughts and no plans verbalized.  No phobias obsessions compulsions or distorted body perceptions reported.  Hx Risk Factors: chronic psychiatric problems, chronic anxiety symptoms, chronic psychotic symptoms, his aunt possibly moving away  Sensorium: Alert and oriented x 3 spheres  cognition: recent and remote memory grossly intact  Consciousness: alert and awake  Attention: attention span and concentration are age  appropriate  Intellect: appears to be of average intelligence  Insight: intact  Judgement: intact  Motor Activity: no abnormal movements     Vitals  Temp:  [97.5 °F (36.4 °C)] 97.5 °F (36.4 °C)  HR:  [] 92  Resp:  [12-20] 20  BP: (128-148)/(73-92) 130/78  SpO2:  [94 %-100 %] 94 %    Intake/Output Summary (Last 24 hours) at 4/5/2024 0746  Last data filed at 4/5/2024 0703  Gross per 24 hour   Intake 250 ml   Output --   Net 250 ml       Lab Results: All Labs For Current Hospital Admission Reviewed ANC on 4/2/24 was 5650, rest of labs OK but Vit D level is  low      Current Facility-Administered Medications   Medication Dose Route Frequency Provider Last Rate    acetaminophen  650 mg Oral Q6H PRN Jordan C Holter, DO      acetaminophen  650 mg Oral Q4H PRN Jordan C Holter, DO      acetaminophen  975 mg Oral Q6H PRN Jordan C Holter, DO      aluminum-magnesium hydroxide-simethicone  30 mL Oral Q4H PRN Jordan C Holter, DO      amLODIPine  5 mg Oral Daily HOLLI Lion      Artificial Tears  1 drop Both Eyes Q3H PRN Jordan C Holter, DO      atropine  1 drop Sublingual HS HOLLI Lion      atropine  1 drop Sublingual Daily PRN HOLLI Lion      benztropine  1 mg Intramuscular Q4H PRN Max 6/day Jordan C Holter, DO      benztropine  1 mg Oral Q4H PRN Max 6/day HOLLI Lion      benztropine  1 mg Oral Q4H PRN Max 6/day Jordan C Holter, DO      cloZAPine  450 mg Oral HS HOLLI Lion      cyanocobalamin  1,000 mcg Oral Daily HOLLI Galvan      hydrOXYzine HCL  50 mg Oral Q6H PRN Max 4/day Jordan C Holter,       Or    diphenhydrAMINE  50 mg Intramuscular Q6H PRN Jordan C Holter, DO      diphenhydrAMINE-zinc acetate   Topical BID PRN HOLLI Lion      ergocalciferol  50,000 Units Oral Weekly HOLLI Galvan      escitalopram  20 mg Oral Daily Eveline Hangey, CRNP      hydrocortisone   Topical 4x Daily PRN HOLLI Lion      hydrOXYzine HCL  100 mg Oral Q6H PRN Max  4/day [facility] Holter, DO      Or    LORazepam  2 mg Intramuscular Q6H PRN [facility] [address] Holter, DO      hydrOXYzine HCL  25 mg Oral Q6H PRN Max 4/day [facility] [address] Holter, DO      ibuprofen  600 mg Oral Q8H PRN HOLLI Lion      lactated ringers  50 mL/hr Intravenous Continuous Ramya Arana MD      melatonin  3 mg Oral HS [facility] [address] Holter, DO      metFORMIN  500 mg Oral Daily With Breakfast HOLLI Lion      methocarbamol  500 mg Oral Q6H PRN HOLLI Lion      minocycline  100 mg Oral Q12H [facility] HOLLI Lion      nicotine polacrilex  2 mg Oral Q2H PRN [facility] [address] Holter, DO      OLANZapine  5 mg Oral Q4H PRN Max 3/day [facility] [address] Holter, DO      Or    OLANZapine  2.5 mg Intramuscular Q4H PRN Max 3/day [facility] [address] Holter, DO      OLANZapine  5 mg Oral Q3H PRN Max 3/day [facility] [address] Holter, DO      Or    OLANZapine  5 mg Intramuscular Q3H PRN Max 3/day [facility] [address] Holter, DO      OLANZapine  2.5 mg Oral Q4H PRN Max 6/day [facility] [address] Holter, DO      ondansetron  4 mg Oral Q6H PRN HOLLI Lion      polyethylene glycol  17 g Oral Daily HOLLI Lion      polyethylene glycol  17 g Oral Daily PRN [facility] [address] Holter, DO      propranolol  10 mg Oral Q12H [facility] HOLLI Lion      senna-docusate sodium  1 tablet Oral Daily PRN [facility] [address] Holter, DO      senna-docusate sodium  2 tablet Oral BID HOLLI Lion      white petrolatum-mineral oil   Topical TID PRN HOLLI Loin         Counseling / Coordination of Care: Total floor / unit time spent today 15 minutes. Greater than 50% of total time was spent with the patient and / or family counseling and / or somewhat receptive to supportive listening and teaching positive coping skills to deal with symptom mangement.     Patient's Rights, confidentiality and exceptions to confidentiality, use of automated medical record, [facility] Services staff access to medical record, and consent to treatment reviewed.    This note has been dictated and hence there  may be problems with punctuation, spelling and formatting and if anyone has any concerns please address them to Dr. Rosario   This note is not shared with patient due to potential for making patient's condition worse by knowing the content of the note.

## 2024-04-05 NOTE — PLAN OF CARE
Problem: Alteration in Thoughts and Perception  Goal: Verbalize thoughts and feelings  Description: Interventions:  - Promote a nonjudgmental and trusting relationship with the patient through active listening and therapeutic communication  - Assess patient's level of functioning, behavior and potential for risk  - Engage patient in 1 on 1 interactions  - Encourage patient to express fears, feelings, frustrations, and discuss symptoms    - [place] patient to reality, help patient recognize reality-based thinking   - Administer medications as ordered and assess for potential side effects  - Provide the patient education related to the signs and symptoms of the illness and desired effects of prescribed medications  Outcome: Not Progressing  Goal: Refrain from acting on delusional thinking/internal stimuli  Description: Interventions:  - Monitor patient closely, per order   - Utilize least restrictive measures   - Set reasonable limits, give positive feedback for acceptable   - Administer medications as ordered and monitor of potential side effects  Outcome: Not Progressing  Goal: Agree to be compliant with medication regime, as prescribed and report medication side effects  Description: Interventions:  - Offer appropriate PRN medication and supervise ingestion; conduct AIMS, as needed   Outcome: Not Progressing  Goal: Attend and participate in unit activities, including therapeutic, recreational, and educational groups  Description: Interventions:  -Encourage Visitation and family involvement in care  Outcome: Not Progressing  Goal: Recognize dysfunctional thoughts, communicate reality-based thoughts at the time of discharge  Description: Interventions:  - Provide medication and psycho-education to assist patient in compliance and developing insight into his/her illness   Outcome: Not Progressing  Goal: Complete daily ADLs, including personal hygiene independently, as able  Description: Interventions:  - Observe,  teach, and assist patient with ADLS  - Monitor and promote a balance of rest/activity, with adequate nutrition and elimination   Outcome: Not Progressing     Problem: Ineffective Coping  Goal: Cooperates with admission process  Description: Interventions:   - Complete admission process  Outcome: Not Progressing  Goal: Identifies ineffective coping skills  Outcome: Not Progressing  Goal: Identifies healthy coping skills  Outcome: Not Progressing  Goal: Demonstrates healthy coping skills  Outcome: Not Progressing  Goal: Participates in unit activities  Description: Interventions:  - Provide therapeutic environment   - Provide required programming   - Redirect inappropriate behaviors   Outcome: Not Progressing  Goal: Patient/Family participate in treatment and DC plans  Description: Interventions:  - Provide therapeutic environment  Outcome: Not Progressing  Goal: Patient/Family verbalizes awareness of resources  Outcome: Not Progressing     Problem: Depression  Goal: Verbalize thoughts and feelings  Description: Interventions:  - Assess and re-assess patient's level of risk   - Engage patient in 1:1 interactions, daily, for a minimum of 15 minutes   - Encourage patient to express feelings, fears, frustrations, hopes   Outcome: Not Progressing  Goal: Refrain from harming self  Description: Interventions:  - Monitor patient closely, per order   - Supervise medication ingestion, monitor effects and side effects   Outcome: Not Progressing  Goal: Refrain from isolation  Description: Interventions:  - Develop a trusting relationship   - Encourage socialization   Outcome: Not Progressing  Goal: Attend and participate in unit activities, including therapeutic, recreational, and educational groups  Description: Interventions:  - Provide therapeutic and educational activities daily, encourage attendance and participation, and document same in the medical record   Outcome: Not Progressing  Goal: Complete daily ADLs, including  personal hygiene independently, as able  Description: Interventions:  - Observe, teach, and assist patient with ADLS  -  Monitor and promote a balance of rest/activity, with adequate nutrition and elimination   Outcome: Not Progressing     Problem: Anxiety  Goal: Anxiety is at manageable level  Description: Interventions:  - Assess and monitor patient's anxiety level.   - Monitor for signs and symptoms (heart palpitations, chest pain, shortness of breath, headaches, nausea, feeling jumpy, restlessness, irritable, apprehensive).   - Collaborate with interdisciplinary team and initiate plan and interventions as ordered.  - [place] patient to unit/surroundings  - Explain treatment plan  - Encourage participation in care  - Encourage verbalization of concerns/fears  - Identify coping mechanisms  - Assist in developing anxiety-reducing skills  - Administer/offer alternative therapies  - Limit or eliminate stimulants  Outcome: Not Progressing     Problem: Alteration in Orientation  Goal: Interact with staff daily  Description: Interventions:  - Assess and re-assess patient's level of orientation  - Engage patient in 1 on 1 interactions, daily, for a minimum of 15 minutes   - Establish rapport/trust with patient   Outcome: Not Progressing  Goal: Express concerns related to confused thinking related to:  Description: Interventions:  - Encourage patient to express feelings, fears, frustrations, hopes  - Assign consistent caregivers   - [place]/re-orient patient as needed  - Allow comfort items, as appropriate  - Provide visual cues, signs, etc.   Outcome: Not Progressing  Goal: Allow medical examinations, as recommended  Description: Interventions:  - Provide physical/neurological exams and/or referrals, per provider   Outcome: Not Progressing  Goal: Cooperate with recommended testing/procedures  Description: Interventions:  - Determine need for ancillary testing  - Observe for mental status changes  - Implement  falls/precaution protocol   Outcome: Not Progressing  Goal: Attend and participate in unit activities, including therapeutic, recreational, and educational groups  Description: Interventions:  - Provide therapeutic and educational activities daily, encourage attendance and participation, and document same in the medical record   - Provide appropriate opportunities for reminiscence   - Provide a consistent daily routine   - Encourage family contact/visitation   Outcome: Not Progressing  Goal: Complete daily ADLs, including personal hygiene independently, as able  Description: Interventions:  - Observe, teach, and assist patient with ADLS  Outcome: Not Progressing     Problem: Individualized Interventions  Goal: Patient will verbalize appropriate use of telephone within 5 days  Description: Interventions:  - Treatment team to determine use of supervised phone privileges   Outcome: Not Progressing  Goal: Patient will verbalize need for hospitalization and will no longer attempt elopement within 5 days  Description: Interventions:  - Ongoing education to help patient understand need for hospitalization  Outcome: Not Progressing  Goal: Patient will recognize inappropriate behaviors and develop alternative behaviors within 5 days  Description: Interventions:  - Patient in collaboration with Treatment Team will develop a behavior management plan to help identify effective coping skills to deal with stressors  Outcome: Not Progressing     Problem: Electroconvulsive therapy (ECT)  Goal: Verbalizes/displays adequate comfort level or baseline comfort level  Description: Interventions:  - Encourage patient to monitor pain and request assistance  - Assess pain using appropriate pain scale  - Administer analgesics based on type and severity of pain and evaluate response  - Implement non-pharmacological measures as appropriate and evaluate response  - Consider cultural and social influences on pain and pain management  - Notify  physician/advanced practitioner if interventions unsuccessful or patient reports new pain  Outcome: Not Progressing  Goal: Achieves stable or improved neurological status  Description: INTERVENTIONS  - Monitor and report changes in neurological status  - Monitor vital signs such as temperature, blood pressure, glucose, and any other labs ordered   - Initiate measures to prevent increased intracranial pressure  - Monitor for seizure activity and implement precautions if appropriate      Outcome: Not Progressing  Goal: Achieves maximal functionality and self care  Description: INTERVENTIONS  - Monitor swallowing and airway patency with patient fatigue and changes in neurological status  - Encourage and assist patient to increase activity and self care.   - Encourage visually impaired, hearing impaired and aphasic patients to use assistive/communication devices  Outcome: Not Progressing  Goal: Maintain or return mobility to safest level of function  Description: INTERVENTIONS:  - Assess patient's ability to carry out ADLs; assess patient's baseline for ADL function and identify physical deficits which impact ability to perform ADLs (bathing, care of mouth/teeth, toileting, grooming, dressing, etc.)  - Assess/evaluate cause of self-care deficits   - Assess range of motion  - Assess patient's mobility  - Assess patient's need for assistive devices and provide as appropriate  - Encourage maximum independence but intervene and supervise when necessary  - Involve family in performance of ADLs  - Assess for home care needs following discharge   - Consider OT consult to assist with ADL evaluation and planning for discharge  - Provide patient education as appropriate  Outcome: Not Progressing  Goal: Absence of urinary retention  Description: INTERVENTIONS:  - Assess patient’s ability to void and empty bladder  - Monitor I/O  - Bladder scan as needed  - Discuss with physician/AP medications to alleviate retention as needed  -  Discuss catheterization for long term situations as appropriate  Outcome: Not Progressing  Goal: Minimal or absence of nausea and/or vomiting  Description: INTERVENTIONS:  - Administer IV fluids if ordered to ensure adequate hydration  - Maintain NPO status until nausea and vomiting are resolved  - Nasogastric tube if ordered  - Administer ordered antiemetic medications as needed  - Provide nonpharmacologic comfort measures as appropriate  - Advance diet as tolerated, if ordered  - Consider nutrition services referral to assist patient with adequate nutrition and appropriate food choices  Outcome: Not Progressing  Goal: Maintains adequate nutritional intake  Description: INTERVENTIONS:  - Monitor percentage of each meal consumed  - Identify factors contributing to decreased intake, treat as appropriate  - Assist with meals as needed  - Monitor I&O, weight, and lab values if indicated  - Obtain nutrition services referral as needed  Outcome: Not Progressing     Problem: DISCHARGE PLANNING - CARE MANAGEMENT  Goal: Discharge to post-acute care or home with appropriate resources  Description: INTERVENTIONS:  - Conduct assessment to determine patient/family and health care team treatment goals, and need for post-acute services based on payer coverage, community resources, and patient preferences, and barriers to discharge  - Address psychosocial, clinical, and financial barriers to discharge as identified in assessment in conjunction with the patient/family and health care team  - Arrange appropriate level of post-acute services according to patient’s   needs and preference and payer coverage in collaboration with the physician and health care team  - Communicate with and update the patient/family, physician, and health care team regarding progress on the discharge plan  - Arrange appropriate transportation to post-acute venues  Outcome: Not Progressing

## 2024-04-05 NOTE — PROCEDURES
"Procedure Note - ECT  Alberto Berumen 28 y.o. male MRN: [id number]    Time out was taken with staff to confirm correct patient and correct procedure to be performed.  Patient reports ongoing auditory hallucinations although states that ECT has made them \"a little bit better\".  Denies any adverse effects to the treatments today.  Agrees to proceed.    Session Number: 14    Diagnosis: Principal Problem:    Schizoaffective disorder, bipolar type ([facility])  Active Problems:    GERD (gastroesophageal reflux disease)    Medical clearance for psychiatric admission    Tobacco abuse    T wave inversion in EKG    Chronic idiopathic constipation    Confluent and reticulate papillomatosis    Primary hypertension    Elevated hemoglobin A1c      ECT Type: Inpatient    Anesthesia: Methohexital    Electrode Placement: Bilateral    Energy level:  100 %      Seizure Duration     EE Sec.    EMG : 19 Sec    Post-ictal Suppression Index: 89.7 %    Results:Clinical seizure was satisfactory, Patient tolerated ECT well    Vitals:    24 0720   BP: 130/78   Pulse: 92   Resp: 20   Temp:    SpO2: 94%        Medication Administration - last 24 hours from 2024 0745 to 2024 0745         Date/Time Order Dose Route Action Action by     2024 0555 EDT amLODIPine (NORVASC) tablet 5 mg 5 mg Oral Given Maxine Blevins LPN     2024 0851 EDT amLODIPine (NORVASC) tablet 5 mg 5 mg Oral Given Maxine Blevins LPN     2024 EDT atropine (ISOPTO ATROPINE) 1 % ophthalmic solution 1 drop 1 drop Sublingual Given Isabel Lama RN     2024 EDT cloZAPine (CLOZARIL) tablet 450 mg 450 mg Oral Given Isabel Lama RN     2024 0851 EDT escitalopram (LEXAPRO) tablet 20 mg 20 mg Oral Given Maxine Blevins LPN     2024 0850 EDT metFORMIN (GLUCOPHAGE) tablet 500 mg 500 mg Oral Given Maxine Blevins LPN     2024 EDT minocycline (MINOCIN) capsule 100 mg 100 mg Oral Given Isabel Lama RN     2024 " 0851 EDT minocycline (MINOCIN) capsule 100 mg 100 mg Oral Given Maxine Blevins LPN     04/04/2024 0850 EDT polyethylene glycol (MIRALAX) packet 17 g 17 g Oral Given Maxine Blevins LPN     04/05/2024 0553 EDT propranolol (INDERAL) tablet 10 mg -- Oral Not Given Maxine Blevins LPN     04/05/2024 0552 EDT propranolol (INDERAL) tablet 10 mg 10 mg Oral Given Maxine Blevins LPN     04/04/2024 2113 EDT propranolol (INDERAL) tablet 10 mg 10 mg Oral Given Isabel Lama RN     04/04/2024 0852 EDT propranolol (INDERAL) tablet 10 mg 10 mg Oral Given Maxine Blevins LPN     04/04/2024 1706 EDT senna-docusate sodium (SENOKOT S) 8.6-50 mg per tablet 2 tablet 2 tablet Oral Given Isabel Lama RN     04/04/2024 0851 EDT senna-docusate sodium (SENOKOT S) 8.6-50 mg per tablet 2 tablet 2 tablet Oral Given Maxine Blevins LPN     04/04/2024 2114 EDT melatonin tablet 3 mg 3 mg Oral Given Isabel Lama RN     04/04/2024 2113 EDT nicotine polacrilex (NICORETTE) gum 2 mg 2 mg Oral Given Isabel Lama RN     04/04/2024 1714 EDT nicotine polacrilex (NICORETTE) gum 2 mg 2 mg Oral Given Isabel Lama RN     04/04/2024 1243 EDT nicotine polacrilex (NICORETTE) gum 2 mg 2 mg Oral Given Maxine Blevins LPN     04/04/2024 0851 EDT nicotine polacrilex (NICORETTE) gum 2 mg 2 mg Oral Given Maxine Blevins LPN     04/04/2024 0850 EDT cyanocobalamin (VITAMIN B-12) tablet 1,000 mcg 1,000 mcg Oral Given Maxine Blevins LPN

## 2024-04-05 NOTE — NURSING NOTE
Patient was isolative to room and self. Visible for lunch. Pleasant and cooperative with staff. Brightens on approach. Reports hearing auditory hallucinations. Denies pain. Did not attend group. Took a nap after lunch. Q 7 min safety checks maintained.

## 2024-04-05 NOTE — ANESTHESIA PREPROCEDURE EVALUATION
Procedure:  ELECTROCONVULSIVE THERAPY (ECT)    Relevant Problems   CARDIO   (+) Primary hypertension      GI/HEPATIC   (+) GERD (gastroesophageal reflux disease)        Physical Exam    Airway    Mallampati score: II  TM Distance: >3 FB  Neck ROM: full     Dental       Cardiovascular  Cardiovascular exam normal    Pulmonary  Pulmonary exam normal     Other Findings      Anesthesia Plan  ASA Score- 2     Anesthesia Type- general with ASA Monitors.         Additional Monitors:     Airway Plan:            Plan Factors-Exercise tolerance (METS): >4 METS.    Chart reviewed. EKG reviewed.  Existing labs reviewed. Patient summary reviewed.                  Induction- intravenous.    Postoperative Plan-     Informed Consent- Anesthetic plan and risks discussed with patient.  I personally reviewed this patient with the CRNA. Discussed and agreed on the Anesthesia Plan with the CRNA..            Lab Results   Component Value Date    HGBA1C 5.0 04/01/2024       Lab Results   Component Value Date    K 4.1 04/01/2024     04/01/2024    CO2 27 04/01/2024    BUN 12 04/01/2024    CREATININE 0.90 04/01/2024    GLUF 97 01/11/2024    CALCIUM 9.3 04/01/2024    AST 23 04/01/2024    ALT 53 (H) 04/01/2024    ALKPHOS 84 04/01/2024    EGFR 116 04/01/2024       Lab Results   Component Value Date    WBC 9.45 04/01/2024    HGB 12.9 04/01/2024    HCT 41.8 04/01/2024    MCV 78 (L) 04/01/2024     04/01/2024     Sinus tachycardia  Nonspecific T wave abnormality  Abnormal ECG  When compared with ECG of 11-MAR-2024 15:41, (unconfirmed)  Sinus rhythm has replaced Ectopic atrial rhythm  QRS axis Shifted left  Confirmed by Angel Lara ([id number]) on 3/11/2024 5:11:48 PM      Specimen Collected: 03/11/24 15:42

## 2024-04-05 NOTE — PROGRESS NOTES
04/05/24 0810   Team Meeting   Meeting Type Daily Rounds   Team Members Present   Team Members Present Physician;Nurse;;Other (Discipline and Name)   Physician Team Member Thomas, Holter, Hangey CRNP   Nursing Team Member Lance   Social Work Team Member Jose J   Other (Discipline and Name) Jeremias MS   Patient/Family Present   Patient Present No   Patient's Family Present No     Groups Participation  0/9.   Patient's compliant with medications. He's paranoid and remains isolated in his room. ECT 14. Guarded. Does not engage in treatment.

## 2024-04-06 PROCEDURE — 99232 SBSQ HOSP IP/OBS MODERATE 35: CPT | Performed by: STUDENT IN AN ORGANIZED HEALTH CARE EDUCATION/TRAINING PROGRAM

## 2024-04-06 RX ADMIN — CLOZAPINE 450 MG: 25 TABLET ORAL at 21:13

## 2024-04-06 RX ADMIN — NICOTINE POLACRILEX 2 MG: 2 GUM, CHEWING BUCCAL at 17:42

## 2024-04-06 RX ADMIN — PROPRANOLOL HYDROCHLORIDE 10 MG: 10 TABLET ORAL at 08:41

## 2024-04-06 RX ADMIN — NICOTINE POLACRILEX 2 MG: 2 GUM, CHEWING BUCCAL at 20:12

## 2024-04-06 RX ADMIN — ESCITALOPRAM OXALATE 20 MG: 10 TABLET, FILM COATED ORAL at 08:41

## 2024-04-06 RX ADMIN — POLYETHYLENE GLYCOL 3350 17 G: 17 POWDER, FOR SOLUTION ORAL at 08:41

## 2024-04-06 RX ADMIN — METFORMIN HYDROCHLORIDE 500 MG: 500 TABLET ORAL at 08:41

## 2024-04-06 RX ADMIN — PROPRANOLOL HYDROCHLORIDE 10 MG: 10 TABLET ORAL at 21:13

## 2024-04-06 RX ADMIN — MELATONIN TAB 3 MG 3 MG: 3 TAB at 21:13

## 2024-04-06 RX ADMIN — MINOCYCLINE HYDROCHLORIDE 100 MG: 100 CAPSULE ORAL at 21:13

## 2024-04-06 RX ADMIN — ATROPINE SULFATE 1 DROP: 10 SOLUTION/ DROPS OPHTHALMIC at 21:23

## 2024-04-06 RX ADMIN — AMLODIPINE BESYLATE 5 MG: 5 TABLET ORAL at 08:41

## 2024-04-06 RX ADMIN — SENNOSIDES AND DOCUSATE SODIUM 2 TABLET: 8.6; 5 TABLET ORAL at 08:41

## 2024-04-06 RX ADMIN — CYANOCOBALAMIN TAB 1000 MCG 1000 MCG: 1000 TAB at 08:41

## 2024-04-06 RX ADMIN — NICOTINE POLACRILEX 2 MG: 2 GUM, CHEWING BUCCAL at 12:48

## 2024-04-06 RX ADMIN — NICOTINE POLACRILEX 2 MG: 2 GUM, CHEWING BUCCAL at 15:16

## 2024-04-06 RX ADMIN — MINOCYCLINE HYDROCHLORIDE 100 MG: 100 CAPSULE ORAL at 08:41

## 2024-04-06 RX ADMIN — SENNOSIDES AND DOCUSATE SODIUM 2 TABLET: 8.6; 5 TABLET ORAL at 17:01

## 2024-04-06 NOTE — NURSING NOTE
"Alberto has been awake, alert, and visible intermittently out in the milieu.  Pt ate 100% supper.  Minimal interaction noted with select peers.  Pt polite, and cooperative.  Affect.  Pt rated his depression a #3/10 and denies any  anxiety.  Pt stated that he hears voices and that they are \"the same\" and that they say they're gonna kill me.\"  Pt stated that he is able to block them by \"praying\".  Pt has not verbalized any delusions.  Pt spends time resting in room.  Pt attended and participated in evening coping skills group, not wrap up group, but came out and had snack after with peers.  Pt requested prn Nicotine gum and 2 mg po given at 1721, 1935, and 2141.  Pt cooperative with taking scheduled medications as ordered and without incident.  Pt maintained on continuous safety checks.  Will continue to monitor/assess for any changes in behavior.  "

## 2024-04-06 NOTE — PLAN OF CARE
Problem: Alteration in Thoughts and Perception  Goal: Verbalize thoughts and feelings  Description: Interventions:  - Promote a nonjudgmental and trusting relationship with the patient through active listening and therapeutic communication  - Assess patient's level of functioning, behavior and potential for risk  - Engage patient in 1 on 1 interactions  - Encourage patient to express fears, feelings, frustrations, and discuss symptoms    - Ola patient to reality, help patient recognize reality-based thinking   - Administer medications as ordered and assess for potential side effects  - Provide the patient education related to the signs and symptoms of the illness and desired effects of prescribed medications  Outcome: Progressing  Goal: Agree to be compliant with medication regime, as prescribed and report medication side effects  Description: Interventions:  - Offer appropriate PRN medication and supervise ingestion; conduct AIMS, as needed   Outcome: Progressing  Goal: Attend and participate in unit activities, including therapeutic, recreational, and educational groups  Description: Interventions:  -Encourage Visitation and family involvement in care  Outcome: Not Progressing     Problem: Ineffective Coping  Goal: Cooperates with admission process  Description: Interventions:   - Complete admission process  Outcome: Completed  Goal: Identifies healthy coping skills  Outcome: Progressing

## 2024-04-06 NOTE — PROGRESS NOTES
"Progress Note - Behavioral Health   Alberto Berumen 28 y.o. male MRN: [id number]  Unit/Bed#: [facility] 107-01 Encounter: [id number]    This note was not shared with the patient due to reasonable likelihood of causing patient harm    Patient was seen today for continuation of care, records reviewed and patient was discussed with the morning case review team. Per nursing report, patient continues to have ECT treatments which are going well. Patient continues to hear voices telling him to kill himself but he does not listen to them. He remains medication/meal compliant. Last shower was recorded on 4/4. Patient remains isolative to his room. No major concerns at this time.    Alberto was seen today for psychiatric follow-up. On assessment today, Alberto was approached while lying in bed. Patient was cooperative with interview today. States that his mood is \"okay\" today. Reports that sleep has been okay. Energy is 50-50. Appetite remains good. States that he did not eat breakfast this morning though. Patient continues to experience AH and states that the voices are bad but he is usually able to redirect his thoughts. Encouraged patient to attend groups and to interact with peers. Patient has no questions or concerns at this time.    Alberto denies acute suicidal/self-harm ideation/intent/plan upon direct inquiry at this time. Alberto remains behaviorally appropriate, no agitation or aggression noted on exam or in report. Alberto also denies HI/VH, and does not appear overtly manic. No overt delusions or paranoia are verbalized. Alberto remains adherent to his current psychotropic medication regimen and denies any side effects from medications.    Vitals:  Vitals:    04/07/24 0801   BP: 126/85   Pulse: 88   Resp: 18   Temp: 97.8 °F (36.6 °C)   SpO2: 97%       Laboratory Results:  I have personally reviewed all pertinent laboratory/tests results.  Most Recent Labs:   Lab Results   Component Value Date    WBC 9.45 04/01/2024    " RBC 5.36 04/01/2024    HGB 12.9 04/01/2024    HCT 41.8 04/01/2024     04/01/2024    RDW 13.5 04/01/2024    NEUTROABS 5.65 04/01/2024    SODIUM 139 04/01/2024    K 4.1 04/01/2024     04/01/2024    CO2 27 04/01/2024    BUN 12 04/01/2024    CREATININE 0.90 04/01/2024    GLUC 120 04/01/2024    GLUF 97 01/11/2024    CALCIUM 9.3 04/01/2024    AST 23 04/01/2024    ALT 53 (H) 04/01/2024    ALKPHOS 84 04/01/2024    TP 7.2 04/01/2024    ALB 4.0 04/01/2024    TBILI 0.23 04/01/2024    CHOLESTEROL 156 03/14/2024    HDL 44 03/14/2024    TRIG 133 03/14/2024    LDLCALC 85 03/14/2024    NONHDLC 112 03/14/2024    LITHIUM 0.61 01/09/2024    [id number] 1.062 11/15/2023    HGBA1C 5.0 04/01/2024    EAG 97 04/01/2024       Psychiatric Review of Systems:  Behavior over the last 24 hours:  unchanged.   Sleep: Appropriate  Appetite: Appropriate  Medication side effects: Denies  ROS: no complaints, all other systems are negative for acute changes    Mental Status Evaluation:  Appearance:  age appropriate, casually dressed, adequate grooming, looks older than stated age   Behavior:  cooperative, calm, mild anxiousness   Speech:  normal rate and volume   Mood:  depressed, anxious   Affect:  normal range and intensity, mood-congruent   Thought Process:  organized, coherent, goal directed   Thought Content:  paranoid ideation, negative thoughts, intrusive thoughts, preoccupied, no overt paranoia noted on exam   Perceptual Disturbances: no visual hallucinations, reports continued auditory hallucinations   Risk Potential: Suicidal ideation - None at present  Homicidal ideation - None at present  Potential for aggression - No   Memory:  recent and remote memory grossly intact   Sensorium  person, place, and situation      Consciousness:  alert and awake   Attention: attention span and concentration are age appropriate   Insight:  age appropriate   Judgment: age appropriate   Gait/Station: in bed   Motor Activity: no abnormal movements      Progress Toward Goals:   Alberto is progressing towards goals of inpatient psychiatric treatment by continued medication compliance and is attending therapeutic modalities on the milieu. However, the patient continues to require inpatient psychiatric hospitalization for continued medication management and titration to optimize symptom reduction, improve sleep hygiene, and demonstrate adequate self-care.    Assessment/Plan   Principal Problem:    Schizoaffective disorder, bipolar type ([facility])  Active Problems:    GERD (gastroesophageal reflux disease)    Medical clearance for psychiatric admission    Tobacco abuse    T wave inversion in EKG    Chronic idiopathic constipation    Confluent and reticulate papillomatosis    Primary hypertension    Elevated hemoglobin A1c      Recommended Treatment:     1.Continue with group therapy, milieu therapy and occupational therapy  2.Behavioral Health checks every 7 minutes  3.Continue frequent safety checks and vitals per unit protocol  4.Continue with SLIM medical management as indicated  5.Continue with current medication regimen: Clozapine 450 mg at bedtime, propranolol 10 mg every 12 hours as as needed for anxiety, Lexapro 20 mg once a day, atropine eyedrops sublingual for drooling of saliva, senna 2 tablets twice a day for constipation   6.Will continue to review labs  7.Disposition Planning: Discharge planning and efforts remain ongoing. Discharge plan is to send patient back with his aunt with an ACT team.    Behavioral Health Medications: all current active meds have been reviewed and continue current psychiatric medications.  Current Facility-Administered Medications   Medication Dose Route Frequency Provider Last Rate    acetaminophen  650 mg Oral Q6H PRN Jordan C Holter, DO      acetaminophen  650 mg Oral Q4H PRN Jordan C Holter, DO      acetaminophen  975 mg Oral Q6H PRN Jordan C Holter, DO      aluminum-magnesium hydroxide-simethicone  30 mL Oral Q4H PRN Ion FISCHER  Holter, DO      amLODIPine  5 mg Oral Daily HOLLI Lion      Artificial Tears  1 drop Both Eyes Q3H PRN [facility] Holter, DO      atropine  1 drop Sublingual HS HOLLI Lion      atropine  1 drop Sublingual Daily PRN HOLLI Lion      benztropine  1 mg Intramuscular Q4H PRN Max 6/day [facility] Holter, DO      benztropine  1 mg Oral Q4H PRN Max 6/day HOLLI Lion      benztropine  1 mg Oral Q4H PRN Max 6/day [facility] Holter, DO      cloZAPine  450 mg Oral HS HOLLI Lion      cyanocobalamin  1,000 mcg Oral Daily HOLLI Galvan      hydrOXYzine HCL  50 mg Oral Q6H PRN Max 4/day [facility] Holter, DO      Or    diphenhydrAMINE  50 mg Intramuscular Q6H PRN [facility] Holter, DO      diphenhydrAMINE-zinc acetate   Topical BID PRN HOLLI Lion      ergocalciferol  50,000 Units Oral Weekly HOLLI Galvan      escitalopram  20 mg Oral Daily HOLLI Lion      hydrocortisone   Topical 4x Daily PRN HOLLI Lion      hydrOXYzine HCL  100 mg Oral Q6H PRN Max 4/day [facility] Holter, DO      Or    LORazepam  2 mg Intramuscular Q6H PRN [facility] Holter, DO      hydrOXYzine HCL  25 mg Oral Q6H PRN Max 4/day [facility] Holter, DO      ibuprofen  600 mg Oral Q8H PRN HOLLI Lion      lactated ringers  50 mL/hr Intravenous Continuous Ramya Arana MD      melatonin  3 mg Oral HS [facility] Holter, DO      metFORMIN  500 mg Oral Daily With Breakfast HOLLI Lion      methocarbamol  500 mg Oral Q6H PRN HOLLI Lion      minocycline  100 mg Oral Q12H KAYLIE HOLLI Lion      nicotine polacrilex  2 mg Oral Q2H PRN [facility] Holter, DO      OLANZapine  5 mg Oral Q4H PRN Max 3/day [facility] Holter, DO      Or    OLANZapine  2.5 mg Intramuscular Q4H PRN Max 3/day [facility] Holter, DO      OLANZapine  5 mg Oral Q3H PRN Max 3/day [facility] Holter, DO      Or    OLANZapine  5 mg Intramuscular Q3H PRN Max 3/day Ion FISCHER Holter, DO      OLANZapine  2.5 mg Oral Q4H  PRN Max 6/day Jordan C Holter,       ondansetron  4 mg Oral Q6H PRN HOLLI Lion      polyethylene glycol  17 g Oral Daily HOLLI Lion      polyethylene glycol  17 g Oral Daily PRN Jordan C Holter, DO      propranolol  10 mg Oral Q12H Counts include [facility] beds at the [facility] HOLLI Lion      senna-docusate sodium  1 tablet Oral Daily PRN Jordan C Holter, DO      senna-docusate sodium  2 tablet Oral BID HOLLI Lion      white petrolatum-mineral oil   Topical TID PRN HOLLI Lion         Risks / Benefits of Treatment:  Risks, benefits, and possible side effects of medications explained to patient and patient verbalizes understanding and agreement for treatment.    Counseling / Coordination of Care:  Patient's progress reviewed with nursing staff.  Medications, treatment progress and treatment plan reviewed with patient.  Supportive counseling provided to the patient.    Total floor/unit time spent today 15 minutes. Greater than 50% of total time was spent with the patient and / or family counseling and / or coordination of care. A description of the counseling / coordination of care: medication education, treatment plan, supportive therapy.    Eveline Anglin PA-C  04/07/24

## 2024-04-06 NOTE — PROGRESS NOTES
"Progress Note - Behavioral Health   Alberto Berumen 28 y.o. male MRN: [id number]  Unit/Bed#: [facility] 107-01 Encounter: [id number]    This note was not shared with the patient due to reasonable likelihood of causing patient harm    Patient was seen today for continuation of care, records reviewed and patient was discussed with the morning case review team. Per nursing report, patient had his 14th ECT treatment yesterday. Patient remains isolative and sleeping a lot in his room. Patient is medication compliant. Alberto states that the voices are still there but the ECT is helping with them. States that he will block out the voices by praying. Patient denies active SI when speaking with staff. Reports that he has a supportive family. No major concerns at this time.    Alberto was seen today for psychiatric follow-up. On assessment today, Alberto was approached while laying in bed. Patient is cooperative and pleasant during interview. He states that his mood today is \"okay\". Reports that sleep has been okay although he wakes up during the night sometimes. Energy is adequate.  Appetite remains good. Patient states that he had ECT yesterday and it went well. Patient reports that he has continued AH, but he believes that ECT treatments are helping and his voices are not as loud anymore. States that he is sometimes able to redirect his thoughts when he is hearing these voices. Patient has no concerns at this time.    Alberto denies acute suicidal/self-harm ideation/intent/plan upon direct inquiry at this time. Alberto remains behaviorally appropriate, no agitation or aggression noted on exam or in report. Alberto also denies HI/VH, and does not appear overtly manic. No overt delusions or paranoia are verbalized. Alberto remains adherent to his current psychotropic medication regimen and denies any side effects from medications.    Vitals:  Vitals:    04/06/24 0809   BP: 131/80   Pulse: 81   Resp: 20   Temp: 97.6 °F (36.4 °C) "   SpO2: 98%       Laboratory Results:  I have personally reviewed all pertinent laboratory/tests results.  Most Recent Labs:   Lab Results   Component Value Date    WBC 9.45 04/01/2024    RBC 5.36 04/01/2024    HGB 12.9 04/01/2024    HCT 41.8 04/01/2024     04/01/2024    RDW 13.5 04/01/2024    NEUTROABS 5.65 04/01/2024    SODIUM 139 04/01/2024    K 4.1 04/01/2024     04/01/2024    CO2 27 04/01/2024    BUN 12 04/01/2024    CREATININE 0.90 04/01/2024    GLUC 120 04/01/2024    GLUF 97 01/11/2024    CALCIUM 9.3 04/01/2024    AST 23 04/01/2024    ALT 53 (H) 04/01/2024    ALKPHOS 84 04/01/2024    TP 7.2 04/01/2024    ALB 4.0 04/01/2024    TBILI 0.23 04/01/2024    CHOLESTEROL 156 03/14/2024    HDL 44 03/14/2024    TRIG 133 03/14/2024    LDLCALC 85 03/14/2024    NONHDLC 112 03/14/2024    LITHIUM 0.61 01/09/2024    SMM2HSAXLARB 1.062 11/15/2023    HGBA1C 5.0 04/01/2024    EAG 97 04/01/2024       Psychiatric Review of Systems:  Behavior over the last 24 hours:  unchanged.   Sleep: Adequate  Appetite: Adequate  Medication side effects: Denies  ROS: no complaints, all other systems are negative for acute changes    Mental Status Evaluation:  Appearance:  age appropriate, adequate grooming   Behavior:  pleasant, cooperative, mild anxiousness   Speech:  normal rate and volume   Mood:  anxious   Affect:  normal range and intensity, mood-congruent   Thought Process:  organized, coherent, goal directed   Thought Content:  paranoid ideation, negative thoughts, intrusive thoughts, preoccupied, no overt paranoia noted on exam   Perceptual Disturbances: no visual hallucinations, reports continued auditory hallucinations   Risk Potential: Suicidal ideation - None at present  Homicidal ideation - None at present  Potential for aggression - No   Memory:  recent and remote memory grossly intact   Sensorium  person and place      Consciousness:  alert and awake   Attention: attention span and concentration are age appropriate    Insight:  age appropriate   Judgment: age appropriate   Gait/Station: in bed   Motor Activity: no abnormal movements     Progress Toward Goals:   Alberto is progressing towards goals of inpatient psychiatric treatment by continued medication compliance and is attending therapeutic modalities on the milieu. However, the patient continues to require inpatient psychiatric hospitalization for continued medication management and titration to optimize symptom reduction, improve sleep hygiene, and demonstrate adequate self-care.    Assessment/Plan   Principal Problem:    Schizoaffective disorder, bipolar type (HCC)  Active Problems:    GERD (gastroesophageal reflux disease)    Medical clearance for psychiatric admission    Tobacco abuse    T wave inversion in EKG    Chronic idiopathic constipation    Confluent and reticulate papillomatosis    Primary hypertension    Elevated hemoglobin A1c      Recommended Treatment:     1.Continue with group therapy, milieu therapy and occupational therapy  2.Behavioral Health checks every 7 minutes  3.Continue frequent safety checks and vitals per unit protocol  4.Continue with SLIM medical management as indicated  5.Continue with current medication regimen: Clozapine 450 mg at bedtime, propranolol 10 mg every 12 hours as as needed for anxiety, Lexapro 20 mg once a day, atropine eyedrops sublingual for drooling of saliva, senna 2 tablets twice a day for constipation   6.Will continue to review labs  7.Disposition Planning: Discharge planning and efforts remain ongoing.  Discharge plan is to send patient back with his aunt with an ACT team.    Behavioral Health Medications: all current active meds have been reviewed and continue current psychiatric medications.  Current Facility-Administered Medications   Medication Dose Route Frequency Provider Last Rate    acetaminophen  650 mg Oral Q6H PRN Jordan C Holter,       acetaminophen  650 mg Oral Q4H PRN Jordan C Holter, DO       acetaminophen  975 mg Oral Q6H PRN [facility] Holter, DO      aluminum-magnesium hydroxide-simethicone  30 mL Oral Q4H PRN [facility] Holter, DO      amLODIPine  5 mg Oral Daily HOLLI Lion      Artificial Tears  1 drop Both Eyes Q3H PRN [facility] Holter, DO      atropine  1 drop Sublingual HS HOLLI Lion      atropine  1 drop Sublingual Daily PRN HOLLI Lion      benztropine  1 mg Intramuscular Q4H PRN Max 6/day [facility] Holter, DO      benztropine  1 mg Oral Q4H PRN Max 6/day HOLLI Lion      benztropine  1 mg Oral Q4H PRN Max 6/day [facility] Holter, DO      cloZAPine  450 mg Oral HS HOLLI Lion      cyanocobalamin  1,000 mcg Oral Daily HOLLI Galvan      hydrOXYzine HCL  50 mg Oral Q6H PRN Max 4/day [facility] Holter, DO      Or    diphenhydrAMINE  50 mg Intramuscular Q6H PRN [facility] Holter, DO      diphenhydrAMINE-zinc acetate   Topical BID PRN HOLLI Lion      ergocalciferol  50,000 Units Oral Weekly HOLLI Galvan      escitalopram  20 mg Oral Daily HOLLI Lion      hydrocortisone   Topical 4x Daily PRN HOLLI Lion      hydrOXYzine HCL  100 mg Oral Q6H PRN Max 4/day [facility] Holter, DO      Or    LORazepam  2 mg Intramuscular Q6H PRN [facility] Holter, DO      hydrOXYzine HCL  25 mg Oral Q6H PRN Max 4/day [facility] Holter, DO      ibuprofen  600 mg Oral Q8H PRN HOLLI Lion      lactated ringers  50 mL/hr Intravenous Continuous Ramya Arana MD      melatonin  3 mg Oral HS [facility] Holter, DO      metFORMIN  500 mg Oral Daily With Breakfast OHLLI Lion      methocarbamol  500 mg Oral Q6H PRN HOLLI Lion      minocycline  100 mg Oral Q12H KAYLIE HOLLI Lion      nicotine polacrilex  2 mg Oral Q2H PRN [facility] Holter, DO      OLANZapine  5 mg Oral Q4H PRN Max 3/day [facility] Holter, DO      Or    OLANZapine  2.5 mg Intramuscular Q4H PRN Max 3/day Jordan C Holter, DO      OLANZapine  5 mg Oral Q3H PRN Max 3/day  Jordan C Holter, DO      Or    OLANZapine  5 mg Intramuscular Q3H PRN Max 3/day Jordan C Holter, DO      OLANZapine  2.5 mg Oral Q4H PRN Max 6/day Jordan C Holter, DO      ondansetron  4 mg Oral Q6H PRN HOLLI Lion      polyethylene glycol  17 g Oral Daily HOLLI Lion      polyethylene glycol  17 g Oral Daily PRN Jordan C Holter, DO      propranolol  10 mg Oral Q12H KAYLIE HOLLI Lion      senna-docusate sodium  1 tablet Oral Daily PRN Jordan C Holter,       senna-docusate sodium  2 tablet Oral BID HOLLI Lion      white petrolatum-mineral oil   Topical TID PRN HOLLI Lion         Risks / Benefits of Treatment:  Risks, benefits, and possible side effects of medications explained to patient and patient verbalizes understanding and agreement for treatment.    Counseling / Coordination of Care:  Patient's progress reviewed with nursing staff.  Medications, treatment progress and treatment plan reviewed with patient.  Supportive counseling provided to the patient.    Total floor/unit time spent today 15 minutes. Greater than 50% of total time was spent with the patient and / or family counseling and / or coordination of care. A description of the counseling / coordination of care: medication education, treatment plan, supportive therapy.    Eveline Anglin PA-C  04/06/24

## 2024-04-06 NOTE — NURSING NOTE
Had Nicotine gum at 1516 and 1742. Watched TV in living room. Interacts with select peers. Ate 100% of his meal. Took medication without difficulty. Had Nicotine gum at 1742. Continue to monitor. Precautions maintained.

## 2024-04-07 PROCEDURE — 99232 SBSQ HOSP IP/OBS MODERATE 35: CPT | Performed by: STUDENT IN AN ORGANIZED HEALTH CARE EDUCATION/TRAINING PROGRAM

## 2024-04-07 RX ADMIN — MINOCYCLINE HYDROCHLORIDE 100 MG: 100 CAPSULE ORAL at 21:24

## 2024-04-07 RX ADMIN — MELATONIN TAB 3 MG 3 MG: 3 TAB at 21:24

## 2024-04-07 RX ADMIN — MINOCYCLINE HYDROCHLORIDE 100 MG: 100 CAPSULE ORAL at 09:02

## 2024-04-07 RX ADMIN — NICOTINE POLACRILEX 2 MG: 2 GUM, CHEWING BUCCAL at 17:39

## 2024-04-07 RX ADMIN — PROPRANOLOL HYDROCHLORIDE 10 MG: 10 TABLET ORAL at 09:02

## 2024-04-07 RX ADMIN — NICOTINE POLACRILEX 2 MG: 2 GUM, CHEWING BUCCAL at 15:30

## 2024-04-07 RX ADMIN — CYANOCOBALAMIN TAB 1000 MCG 1000 MCG: 1000 TAB at 09:02

## 2024-04-07 RX ADMIN — PROPRANOLOL HYDROCHLORIDE 10 MG: 10 TABLET ORAL at 21:24

## 2024-04-07 RX ADMIN — NICOTINE POLACRILEX 2 MG: 2 GUM, CHEWING BUCCAL at 13:32

## 2024-04-07 RX ADMIN — ATROPINE SULFATE 1 DROP: 10 SOLUTION/ DROPS OPHTHALMIC at 21:24

## 2024-04-07 RX ADMIN — CLOZAPINE 450 MG: 25 TABLET ORAL at 21:24

## 2024-04-07 RX ADMIN — POLYETHYLENE GLYCOL 3350 17 G: 17 POWDER, FOR SOLUTION ORAL at 09:03

## 2024-04-07 RX ADMIN — SENNOSIDES AND DOCUSATE SODIUM 2 TABLET: 8.6; 5 TABLET ORAL at 09:02

## 2024-04-07 RX ADMIN — ESCITALOPRAM OXALATE 20 MG: 10 TABLET, FILM COATED ORAL at 09:03

## 2024-04-07 RX ADMIN — SENNOSIDES AND DOCUSATE SODIUM 2 TABLET: 8.6; 5 TABLET ORAL at 17:29

## 2024-04-07 RX ADMIN — METFORMIN HYDROCHLORIDE 500 MG: 500 TABLET ORAL at 09:03

## 2024-04-07 RX ADMIN — NICOTINE POLACRILEX 2 MG: 2 GUM, CHEWING BUCCAL at 21:34

## 2024-04-07 RX ADMIN — AMLODIPINE BESYLATE 5 MG: 5 TABLET ORAL at 09:03

## 2024-04-07 NOTE — PLAN OF CARE
Problem: Alteration in Thoughts and Perception  Goal: Treatment Goal: Gain control of psychotic behaviors/thinking, reduce/eliminate presenting symptoms and demonstrate improved reality functioning upon discharge  Outcome: Progressing  Goal: Verbalize thoughts and feelings  Description: Interventions:  - Promote a nonjudgmental and trusting relationship with the patient through active listening and therapeutic communication  - Assess patient's level of functioning, behavior and potential for risk  - Engage patient in 1 on 1 interactions  - Encourage patient to express fears, feelings, frustrations, and discuss symptoms    - [place] patient to reality, help patient recognize reality-based thinking   - Administer medications as ordered and assess for potential side effects  - Provide the patient education related to the signs and symptoms of the illness and desired effects of prescribed medications  Outcome: Progressing  Goal: Agree to be compliant with medication regime, as prescribed and report medication side effects  Description: Interventions:  - Offer appropriate PRN medication and supervise ingestion; conduct AIMS, as needed   Outcome: Progressing  Goal: Attend and participate in unit activities, including therapeutic, recreational, and educational groups  Description: Interventions:  -Encourage Visitation and family involvement in care  Outcome: Progressing  Goal: Complete daily ADLs, including personal hygiene independently, as able  Description: Interventions:  - Observe, teach, and assist patient with ADLS  - Monitor and promote a balance of rest/activity, with adequate nutrition and elimination   Outcome: Progressing     Problem: Ineffective Coping  Goal: Identifies ineffective coping skills  Outcome: Progressing  Goal: Identifies healthy coping skills  Outcome: Progressing  Goal: Demonstrates healthy coping skills  Outcome: Progressing  Goal: Participates in unit activities  Description:  Interventions:  - Provide therapeutic environment   - Provide required programming   - Redirect inappropriate behaviors   Outcome: Progressing  Goal: Patient/Family participate in treatment and DC plans  Description: Interventions:  - Provide therapeutic environment  Outcome: Progressing  Goal: Patient/Family verbalizes awareness of resources  Outcome: Progressing     Problem: Depression  Goal: Treatment Goal: Demonstrate behavioral control of depressive symptoms, verbalize feelings of improved mood/affect, and adopt new coping skills prior to discharge  Outcome: Progressing  Goal: Verbalize thoughts and feelings  Description: Interventions:  - Assess and re-assess patient's level of risk   - Engage patient in 1:1 interactions, daily, for a minimum of 15 minutes   - Encourage patient to express feelings, fears, frustrations, hopes   Outcome: Progressing  Goal: Refrain from harming self  Description: Interventions:  - Monitor patient closely, per order   - Supervise medication ingestion, monitor effects and side effects   Outcome: Progressing  Goal: Refrain from isolation  Description: Interventions:  - Develop a trusting relationship   - Encourage socialization   Outcome: Progressing  Goal: Refrain from self-neglect  Outcome: Progressing  Goal: Attend and participate in unit activities, including therapeutic, recreational, and educational groups  Description: Interventions:  - Provide therapeutic and educational activities daily, encourage attendance and participation, and document same in the medical record   Outcome: Progressing  Goal: Complete daily ADLs, including personal hygiene independently, as able  Description: Interventions:  - Observe, teach, and assist patient with ADLS  -  Monitor and promote a balance of rest/activity, with adequate nutrition and elimination   Outcome: Progressing     Problem: Anxiety  Goal: Anxiety is at manageable level  Description: Interventions:  - Assess and monitor patient's  anxiety level.   - Monitor for signs and symptoms (heart palpitations, chest pain, shortness of breath, headaches, nausea, feeling jumpy, restlessness, irritable, apprehensive).   - Collaborate with interdisciplinary team and initiate plan and interventions as ordered.  - [place] patient to unit/surroundings  - Explain treatment plan  - Encourage participation in care  - Encourage verbalization of concerns/fears  - Identify coping mechanisms  - Assist in developing anxiety-reducing skills  - Administer/offer alternative therapies  - Limit or eliminate stimulants  Outcome: Progressing     Problem: Alteration in Orientation  Goal: Treatment Goal: Demonstrate a reduction of confusion and improved orientation to person, place, time and/or situation upon discharge, according to optimum baseline/ability  Outcome: Progressing  Goal: Interact with staff daily  Description: Interventions:  - Assess and re-assess patient's level of orientation  - Engage patient in 1 on 1 interactions, daily, for a minimum of 15 minutes   - Establish rapport/trust with patient   Outcome: Progressing  Goal: Express concerns related to confused thinking related to:  Description: Interventions:  - Encourage patient to express feelings, fears, frustrations, hopes  - Assign consistent caregivers   - [place]/re-orient patient as needed  - Allow comfort items, as appropriate  - Provide visual cues, signs, etc.   Outcome: Progressing  Goal: Allow medical examinations, as recommended  Description: Interventions:  - Provide physical/neurological exams and/or referrals, per provider   Outcome: Progressing  Goal: Cooperate with recommended testing/procedures  Description: Interventions:  - Determine need for ancillary testing  - Observe for mental status changes  - Implement falls/precaution protocol   Outcome: Progressing  Goal: Attend and participate in unit activities, including therapeutic, recreational, and educational groups  Description:  Interventions:  - Provide therapeutic and educational activities daily, encourage attendance and participation, and document same in the medical record   - Provide appropriate opportunities for reminiscence   - Provide a consistent daily routine   - Encourage family contact/visitation   Outcome: Progressing  Goal: Complete daily ADLs, including personal hygiene independently, as able  Description: Interventions:  - Observe, teach, and assist patient with ADLS  Outcome: Progressing     Problem: Individualized Interventions  Goal: Patient will verbalize appropriate use of telephone within 5 days  Description: Interventions:  - Treatment team to determine use of supervised phone privileges   Outcome: Progressing  Goal: Patient will verbalize need for hospitalization and will no longer attempt elopement within 5 days  Description: Interventions:  - Ongoing education to help patient understand need for hospitalization  Outcome: Progressing  Goal: Patient will recognize inappropriate behaviors and develop alternative behaviors within 5 days  Description: Interventions:  - Patient in collaboration with Treatment Team will develop a behavior management plan to help identify effective coping skills to deal with stressors  Outcome: Progressing     Problem: Electroconvulsive therapy (ECT)  Goal: Treatment Goal: Demonstrate a reduction of confusion and improved orientation to person, place, time and/or situation upon discharge, according to optimum baseline/ability  Outcome: Progressing  Goal: Verbalizes/displays adequate comfort level or baseline comfort level  Description: Interventions:  - Encourage patient to monitor pain and request assistance  - Assess pain using appropriate pain scale  - Administer analgesics based on type and severity of pain and evaluate response  - Implement non-pharmacological measures as appropriate and evaluate response  - Consider cultural and social influences on pain and pain management  -  Notify physician/advanced practitioner if interventions unsuccessful or patient reports new pain  Outcome: Progressing  Goal: Achieves stable or improved neurological status  Description: INTERVENTIONS  - Monitor and report changes in neurological status  - Monitor vital signs such as temperature, blood pressure, glucose, and any other labs ordered   - Initiate measures to prevent increased intracranial pressure  - Monitor for seizure activity and implement precautions if appropriate      Outcome: Progressing  Goal: Achieves maximal functionality and self care  Description: INTERVENTIONS  - Monitor swallowing and airway patency with patient fatigue and changes in neurological status  - Encourage and assist patient to increase activity and self care.   - Encourage visually impaired, hearing impaired and aphasic patients to use assistive/communication devices  Outcome: Progressing  Goal: Maintain or return mobility to safest level of function  Description: INTERVENTIONS:  - Assess patient's ability to carry out ADLs; assess patient's baseline for ADL function and identify physical deficits which impact ability to perform ADLs (bathing, care of mouth/teeth, toileting, grooming, dressing, etc.)  - Assess/evaluate cause of self-care deficits   - Assess range of motion  - Assess patient's mobility  - Assess patient's need for assistive devices and provide as appropriate  - Encourage maximum independence but intervene and supervise when necessary  - Involve family in performance of ADLs  - Assess for home care needs following discharge   - Consider OT consult to assist with ADL evaluation and planning for discharge  - Provide patient education as appropriate  Outcome: Progressing  Goal: Absence of urinary retention  Description: INTERVENTIONS:  - Assess patient’s ability to void and empty bladder  - Monitor I/O  - Bladder scan as needed  - Discuss with physician/AP medications to alleviate retention as needed  - Discuss  catheterization for long term situations as appropriate  Outcome: Progressing  Goal: Minimal or absence of nausea and/or vomiting  Description: INTERVENTIONS:  - Administer IV fluids if ordered to ensure adequate hydration  - Maintain NPO status until nausea and vomiting are resolved  - Nasogastric tube if ordered  - Administer ordered antiemetic medications as needed  - Provide nonpharmacologic comfort measures as appropriate  - Advance diet as tolerated, if ordered  - Consider nutrition services referral to assist patient with adequate nutrition and appropriate food choices  Outcome: Progressing  Goal: Maintains adequate nutritional intake  Description: INTERVENTIONS:  - Monitor percentage of each meal consumed  - Identify factors contributing to decreased intake, treat as appropriate  - Assist with meals as needed  - Monitor I&O, weight, and lab values if indicated  - Obtain nutrition services referral as needed  Outcome: Progressing     Problem: DISCHARGE PLANNING - CARE MANAGEMENT  Goal: Discharge to post-acute care or home with appropriate resources  Description: INTERVENTIONS:  - Conduct assessment to determine patient/family and health care team treatment goals, and need for post-acute services based on payer coverage, community resources, and patient preferences, and barriers to discharge  - Address psychosocial, clinical, and financial barriers to discharge as identified in assessment in conjunction with the patient/family and health care team  - Arrange appropriate level of post-acute services according to patient’s   needs and preference and payer coverage in collaboration with the physician and health care team  - Communicate with and update the patient/family, physician, and health care team regarding progress on the discharge plan  - Arrange appropriate transportation to post-acute venues  Outcome: Progressing

## 2024-04-07 NOTE — NURSING NOTE
Alert, cooperative and visible intermittently. Socializing with selective peers. Consumed 100% of dinner. Took all medication without prompting. Maintained on safe precautions without incident.

## 2024-04-07 NOTE — NURSING NOTE
Pt napping most of morning. Alert, cooperative and visible intermittently. No SI or HI noted. Denies depression, anxiety and pain. Did not  attend groups. Refused breakfast and consumed 100% of lunch. Took all medication without prompting. Maintained on safe precautions without incident. Will continue to monitor progress and recovery program.

## 2024-04-07 NOTE — NURSING NOTE
Patient requested Nicorette gum earlier at 2012.  Patient visible and cooperative with staff all shift. Pleasant upon approach medication compliant as well. Patient offered no complaints and did not request or require any PRN medications. Behaviors controlled and appropriate

## 2024-04-07 NOTE — NURSING NOTE
Weekly wellness assessment completed. Pt lung sounds clear in all lung fields. Trace edema noted to b/l lower ext. Bowel sounds +x4. B/l pedal pulses papable. Skin intact, warm and color within normal limits for patient.

## 2024-04-08 LAB
BASOPHILS # BLD AUTO: 0.04 THOUSANDS/ÂΜL (ref 0–0.1)
BASOPHILS NFR BLD AUTO: 0 % (ref 0–1)
EOSINOPHIL # BLD AUTO: 0.23 THOUSAND/ÂΜL (ref 0–0.61)
EOSINOPHIL NFR BLD AUTO: 3 % (ref 0–6)
ERYTHROCYTE [DISTWIDTH] IN BLOOD BY AUTOMATED COUNT: 13.9 % (ref 11.6–15.1)
HCT VFR BLD AUTO: 41.9 % (ref 36.5–49.3)
HGB BLD-MCNC: 12.7 G/DL (ref 12–17)
IMM GRANULOCYTES # BLD AUTO: 0.02 THOUSAND/UL (ref 0–0.2)
IMM GRANULOCYTES NFR BLD AUTO: 0 % (ref 0–2)
LYMPHOCYTES # BLD AUTO: 2.62 THOUSANDS/ÂΜL (ref 0.6–4.47)
LYMPHOCYTES NFR BLD AUTO: 29 % (ref 14–44)
MCH RBC QN AUTO: 23.6 PG (ref 26.8–34.3)
MCHC RBC AUTO-ENTMCNC: 30.3 G/DL (ref 31.4–37.4)
MCV RBC AUTO: 78 FL (ref 82–98)
MONOCYTES # BLD AUTO: 0.89 THOUSAND/ÂΜL (ref 0.17–1.22)
MONOCYTES NFR BLD AUTO: 10 % (ref 4–12)
NEUTROPHILS # BLD AUTO: 5.39 THOUSANDS/ÂΜL (ref 1.85–7.62)
NEUTS SEG NFR BLD AUTO: 58 % (ref 43–75)
NRBC BLD AUTO-RTO: 0 /100 WBCS
PLATELET # BLD AUTO: 234 THOUSANDS/UL (ref 149–390)
PMV BLD AUTO: 10.7 FL (ref 8.9–12.7)
RBC # BLD AUTO: 5.38 MILLION/UL (ref 3.88–5.62)
WBC # BLD AUTO: 9.19 THOUSAND/UL (ref 4.31–10.16)

## 2024-04-08 PROCEDURE — 99232 SBSQ HOSP IP/OBS MODERATE 35: CPT | Performed by: PSYCHIATRY & NEUROLOGY

## 2024-04-08 PROCEDURE — 85025 COMPLETE CBC W/AUTO DIFF WBC: CPT

## 2024-04-08 RX ADMIN — MELATONIN TAB 3 MG 3 MG: 3 TAB at 21:11

## 2024-04-08 RX ADMIN — NICOTINE POLACRILEX 2 MG: 2 GUM, CHEWING BUCCAL at 17:41

## 2024-04-08 RX ADMIN — POLYETHYLENE GLYCOL 3350 17 G: 17 POWDER, FOR SOLUTION ORAL at 08:41

## 2024-04-08 RX ADMIN — CLOZAPINE 450 MG: 25 TABLET ORAL at 21:10

## 2024-04-08 RX ADMIN — PROPRANOLOL HYDROCHLORIDE 10 MG: 10 TABLET ORAL at 21:11

## 2024-04-08 RX ADMIN — MINOCYCLINE HYDROCHLORIDE 100 MG: 100 CAPSULE ORAL at 08:42

## 2024-04-08 RX ADMIN — PROPRANOLOL HYDROCHLORIDE 10 MG: 10 TABLET ORAL at 08:51

## 2024-04-08 RX ADMIN — NICOTINE POLACRILEX 2 MG: 2 GUM, CHEWING BUCCAL at 14:36

## 2024-04-08 RX ADMIN — SENNOSIDES AND DOCUSATE SODIUM 2 TABLET: 8.6; 5 TABLET ORAL at 08:43

## 2024-04-08 RX ADMIN — MINOCYCLINE HYDROCHLORIDE 100 MG: 100 CAPSULE ORAL at 21:11

## 2024-04-08 RX ADMIN — ATROPINE SULFATE 1 DROP: 10 SOLUTION/ DROPS OPHTHALMIC at 21:38

## 2024-04-08 RX ADMIN — ESCITALOPRAM OXALATE 20 MG: 10 TABLET, FILM COATED ORAL at 08:43

## 2024-04-08 RX ADMIN — METFORMIN HYDROCHLORIDE 500 MG: 500 TABLET ORAL at 08:42

## 2024-04-08 RX ADMIN — CYANOCOBALAMIN TAB 1000 MCG 1000 MCG: 1000 TAB at 08:42

## 2024-04-08 RX ADMIN — SENNOSIDES AND DOCUSATE SODIUM 2 TABLET: 8.6; 5 TABLET ORAL at 17:19

## 2024-04-08 RX ADMIN — NICOTINE POLACRILEX 2 MG: 2 GUM, CHEWING BUCCAL at 21:38

## 2024-04-08 NOTE — PROGRESS NOTES
Psychiatry Progress Note [facility]    Alberto Berumen 28 y.o. male MRN: [id number]  Unit/Bed#: [facility] 107-01 Encounter: [id number]  Code Status: Level 1 - Full Code    PCP: Joce Juan MD    Date of Admission:  3/29/2024 2008   Date of Service:  04/08/24    Patient Active Problem List   Diagnosis    GERD (gastroesophageal reflux disease)    Medical clearance for psychiatric admission    Schizoaffective disorder, bipolar type (HCC)    Tobacco abuse    T wave inversion in EKG    Syringoma    Chronic idiopathic constipation    Vitamin B 12 deficiency    Vitamin D deficiency    Confluent and reticulate papillomatosis    Class 2 obesity in adult    Primary hypertension    Elevated hemoglobin A1c     Review of systems: Unremarkable  diagnosis: Schizoaffective bipolar    Assessment  Overall Status: Continues to hear threatening voices to kill him and his family and not asking him to kill himself stating that they never ask him to kill himself and only threatening to kill him and his family and he says he is trying to cope and the ECT has helped but still hears them all the time.  Hardly attending groups staying on bed in his room most of the time  Certification Statement: The patient will continue to require additional inpatient hospital stay due to ongoing voices that are threatening to kill him and his family lack of response to medications and ECT so far.  Still hears voices that are threatening but not commanding.  His aunt is now planning to move out to [place] and therefore he needs to possibly go into a supervised setting like a group home     Medications: Clozapine 450 mg at bedtime, propranolol 10 mg every 12 hours as as needed for anxiety, Lexapro 20 mg once a day, atropine eyedrops sublingual for drooling of saliva and senna 2 tablets twice a day for constipation  All medications reviewed and recommend they be continued for symptom management  side effects from treatment: None  reported  Medication changes   None at this time    Medication education   Risks side effects benefits and precautions of medications discussed with patient and he did verbalize an understanding about risks for metabolic syndrome from being on neuroleptics and is form tardive dyskinesia etc. and special precautions about being on clozapine   Understanding of medications: Has some understanding   Justification for dual anti-psychotics: Not applicable    Non-pharmacological treatments  Continue with individual, group, milieu and occupational therapy using recovery principles and psycho-education about accepting illness and the need for treatment.   to contact aunt and explore ACT team referral which he never had  ECT to be continued 2 more times on Wed & Friday for a total of 15 followed by weekly maintenance ECTs    Safety  Safety and communication plan established to target dynamic risk factors discussed above.    Discharge Plan   Back to his aunt with an ACT team    Interval Progress   Patient continues to claim to hear threatening voices to kill him and his family, and insists they are not asking him at all to kill himself as documented over the weekend.  He reports that the voices have improved with the ECTs and was asking for more and I told him he will now be receiving maintenance weekly ECTS.  He is usually found staying on his bed in his room hardly coming out or attending groups but does eat meals.  Still appears paranoid preoccupied aloof with minimal interaction with peers.  He says he has not been able to verify with his aunt about her planning to move to [place] but anticipating to talk with his sister by video call later in the evening today when he plans to verify that his aunt is really moving to [place]. .  He has not been aggressive or agitated or threatening or self-abusive and has not needed any estimated behavioral medications over the weekend  .   Acceptance by patient:  Accepting  Hopefulness in recovery: Living with his aunt again  Involved in reintegration process: Talking with aunt and sister  Trusting in relationship with psychiatrist: Beginning to trust  Sleep: Good  Appetite: Good  Compliance with Medications: Compliant  Group attendance: Very few to none 0/9  Significant events: Still with same threatening voices and appearing paranoid and refusing groups staying to himself and eating meals    Mental Status Exam  Appearance: age appropriate, improved grooming, looks older than stated age, overweight, with hair and dreadlocks casually dressed fairly groomed with good eye contact founding resting on bed under covers when approached and did get up and talked to me.   behavior: cooperative, mildly anxious, evasive, gesturing, slow responses preoccupied internally attuned  Speech: normal rate, normal volume, normal pitch  Mood: dysphoric, anxious, reports he feels good  Affect: constricted, inappropriate, mood-congruent  Thought Process: organized, logical, coherent, goal directed, linear, decreased rate of thoughts, slowing of thoughts, negative thinking, impaired abstract reasoning, concrete  Thought Content: paranoid ideation, some paranoia, intrusive thoughts, preoccupied, chronic no current suicidal or homicidal thoughts and no plans verbalized.  No other delusional experiences elicited.  No phobias obsessions compulsions or distorted body perceptions reported.  Appears internally preoccupied paranoid suspicious  Perceptual Disturbances: auditory hallucinations, appears preoccupied, appears responding to internal stimuli, not observed, reports voices tell him that he and his family are going to be killed sometimes from 1 person on more than one person talking to him claiming he has not seen much difference other than a little bit subdued with the ECT.  No current suicidal or homicidal thoughts and no plans verbalized.  No phobias obsessions compulsions or distorted body  perceptions reported.  Hx Risk Factors: chronic psychiatric problems, chronic anxiety symptoms, chronic psychotic symptoms, his aunt possibly moving away  Sensorium: alert and oriented x 3 spheres  cognition: recent and remote memory grossly intact  Consciousness: alert and awake  Attention: attention span and concentration are age appropriate  Intellect: appears to be of average intelligence  Insight: intact  Judgement: intact  Motor Activity: no abnormal movements     Vitals  Temp:  [97.8 °F (36.6 °C)] 97.8 °F (36.6 °C)  HR:  [] 100  Resp:  [18] 18  BP: (126-155)/(85-97) 135/97  SpO2:  [97 %-100 %] 100 %  No intake or output data in the 24 hours ending 04/08/24 0524      Lab Results: All Labs For Current Hospital Admission Reviewed ANC on 4/2/24 was 5650, rest of labs OK but Vit D level is  low      Current Facility-Administered Medications   Medication Dose Route Frequency Provider Last Rate    acetaminophen  650 mg Oral Q6H PRN Jordan C Holter, DO      acetaminophen  650 mg Oral Q4H PRN Jordan C Holter, DO      acetaminophen  975 mg Oral Q6H PRN Jordan C Holter, DO      aluminum-magnesium hydroxide-simethicone  30 mL Oral Q4H PRN Jordan C Holter, DO      amLODIPine  5 mg Oral Daily HOLLI Lion      Artificial Tears  1 drop Both Eyes Q3H PRN Jordan C Holter, DO      atropine  1 drop Sublingual HS HOLLI Lion      atropine  1 drop Sublingual Daily PRN HOLLI Lion      benztropine  1 mg Intramuscular Q4H PRN Max 6/day Jordan C Holter, DO      benztropine  1 mg Oral Q4H PRN Max 6/day HOLLI Lion      benztropine  1 mg Oral Q4H PRN Max 6/day Jordan C Holter, DO      cloZAPine  450 mg Oral HS HOLLI Lion      cyanocobalamin  1,000 mcg Oral Daily HOLLI Galvan      hydrOXYzine HCL  50 mg Oral Q6H PRN Max 4/day Jordan C Holter, DO      Or    diphenhydrAMINE  50 mg Intramuscular Q6H PRN Jordan C Holter, DO      diphenhydrAMINE-zinc acetate   Topical BID PRN Eveline  HOLLI Hunt      ergocalciferol  50,000 Units Oral Weekly HOLLI Galvan      escitalopram  20 mg Oral Daily HOLLI Lion      hydrocortisone   Topical 4x Daily PRN HOLLI Lion      hydrOXYzine HCL  100 mg Oral Q6H PRN Max 4/day [facility] Holter, DO      Or    LORazepam  2 mg Intramuscular Q6H PRN [facility] Holter, DO      hydrOXYzine HCL  25 mg Oral Q6H PRN Max 4/day [facility] Holter, DO      ibuprofen  600 mg Oral Q8H PRN HOLLI Lion      lactated ringers  50 mL/hr Intravenous Continuous Ramya Arana MD      melatonin  3 mg Oral HS [facility] Holter, DO      metFORMIN  500 mg Oral Daily With Breakfast HOLLI Lion      methocarbamol  500 mg Oral Q6H PRN HOLLI Lion      minocycline  100 mg Oral Q12H [facility] HOLLI Lion      nicotine polacrilex  2 mg Oral Q2H PRN [facility] Holter, DO      OLANZapine  5 mg Oral Q4H PRN Max 3/day [facility] Holter, DO      Or    OLANZapine  2.5 mg Intramuscular Q4H PRN Max 3/day [facility] Holter, DO      OLANZapine  5 mg Oral Q3H PRN Max 3/day [facility] Holter, DO      Or    OLANZapine  5 mg Intramuscular Q3H PRN Max 3/day [facility] Holter, DO      OLANZapine  2.5 mg Oral Q4H PRN Max 6/day [facility] Holter, DO      ondansetron  4 mg Oral Q6H PRN HOLLI Lion      polyethylene glycol  17 g Oral Daily HOLLI Lion      polyethylene glycol  17 g Oral Daily PRN [facility] Holter, DO      propranolol  10 mg Oral Q12H [facility] HOLLI Lion      senna-docusate sodium  1 tablet Oral Daily PRN [facility] Holter, DO      senna-docusate sodium  2 tablet Oral BID HOLLI Lion      white petrolatum-mineral oil   Topical TID PRN HOLLI Lion         Counseling / Coordination of Care: Total floor / unit time spent today 15 minutes. Greater than 50% of total time was spent with the patient and / or family counseling and / or somewhat receptive to supportive listening and teaching positive coping skills to deal with symptom mangement.      Patient's Rights, confidentiality and exceptions to confidentiality, use of automated medical record, Behavior[facility] Services staff access to medical record, and consent to treatment reviewed.    This note has been dictated and hence there may be problems with punctuation, spelling and formatting and if anyone has any concerns please address them to Dr. Rosario   This note is not shared with patient due to potential for making patient's condition worse by knowing the content of the note.

## 2024-04-08 NOTE — PROGRESS NOTES
04/08/24 0739   Team Meeting   Meeting Type Daily Rounds   Team Members Present   Team Members Present Physician;Nurse;;Other (Discipline and Name)   Patient/Family Present   Patient Present No   Patient's Family Present No     In attendance:  Dr. Alex Thomas, MD Dr. Jordan Holter, DO Eveline Hunt, HOLLI Luacs, MIGUEL Alvarado, [facility]  Carla Lux, W  Irais Mcdowell M.S.    Groups: 0/9    Pt is not attending groups but has been more present and cooperative/social. Pt has involved himself in a group of peers bullying another peer. Pt denies symptoms other than AH and depression. Pt has virtual visit 4/8 at 6pm.

## 2024-04-08 NOTE — PLAN OF CARE
Problem: Alteration in Thoughts and Perception  Goal: Treatment Goal: Gain control of psychotic behaviors/thinking, reduce/eliminate presenting symptoms and demonstrate improved reality functioning upon discharge  Outcome: Progressing  Goal: Verbalize thoughts and feelings  Description: Interventions:  - Promote a nonjudgmental and trusting relationship with the patient through active listening and therapeutic communication  - Assess patient's level of functioning, behavior and potential for risk  - Engage patient in 1 on 1 interactions  - Encourage patient to express fears, feelings, frustrations, and discuss symptoms    - [place] patient to reality, help patient recognize reality-based thinking   - Administer medications as ordered and assess for potential side effects  - Provide the patient education related to the signs and symptoms of the illness and desired effects of prescribed medications  Outcome: Progressing  Goal: Refrain from acting on delusional thinking/internal stimuli  Description: Interventions:  - Monitor patient closely, per order   - Utilize least restrictive measures   - Set reasonable limits, give positive feedback for acceptable   - Administer medications as ordered and monitor of potential side effects  Outcome: Progressing  Goal: Agree to be compliant with medication regime, as prescribed and report medication side effects  Description: Interventions:  - Offer appropriate PRN medication and supervise ingestion; conduct AIMS, as needed   Outcome: Progressing  Goal: Attend and participate in unit activities, including therapeutic, recreational, and educational groups  Description: Interventions:  -Encourage Visitation and family involvement in care  Outcome: Progressing  Goal: Recognize dysfunctional thoughts, communicate reality-based thoughts at the time of discharge  Description: Interventions:  - Provide medication and psycho-education to assist patient in compliance and developing  insight into his/her illness   Outcome: Progressing  Goal: Complete daily ADLs, including personal hygiene independently, as able  Description: Interventions:  - Observe, teach, and assist patient with ADLS  - Monitor and promote a balance of rest/activity, with adequate nutrition and elimination   Outcome: Progressing     Problem: Ineffective Coping  Goal: Identifies ineffective coping skills  Outcome: Progressing  Goal: Identifies healthy coping skills  Outcome: Progressing  Goal: Demonstrates healthy coping skills  Outcome: Progressing  Goal: Participates in unit activities  Description: Interventions:  - Provide therapeutic environment   - Provide required programming   - Redirect inappropriate behaviors   Outcome: Progressing  Goal: Patient/Family participate in treatment and DC plans  Description: Interventions:  - Provide therapeutic environment  Outcome: Progressing  Goal: Patient/Family verbalizes awareness of resources  Outcome: Progressing     Problem: Depression  Goal: Treatment Goal: Demonstrate behavioral control of depressive symptoms, verbalize feelings of improved mood/affect, and adopt new coping skills prior to discharge  Outcome: Progressing  Goal: Verbalize thoughts and feelings  Description: Interventions:  - Assess and re-assess patient's level of risk   - Engage patient in 1:1 interactions, daily, for a minimum of 15 minutes   - Encourage patient to express feelings, fears, frustrations, hopes   Outcome: Progressing  Goal: Refrain from harming self  Description: Interventions:  - Monitor patient closely, per order   - Supervise medication ingestion, monitor effects and side effects   Outcome: Progressing  Goal: Refrain from isolation  Description: Interventions:  - Develop a trusting relationship   - Encourage socialization   Outcome: Progressing  Goal: Refrain from self-neglect  Outcome: Progressing  Goal: Attend and participate in unit activities, including therapeutic, recreational, and  educational groups  Description: Interventions:  - Provide therapeutic and educational activities daily, encourage attendance and participation, and document same in the medical record   Outcome: Progressing  Goal: Complete daily ADLs, including personal hygiene independently, as able  Description: Interventions:  - Observe, teach, and assist patient with ADLS  -  Monitor and promote a balance of rest/activity, with adequate nutrition and elimination   Outcome: Progressing     Problem: Anxiety  Goal: Anxiety is at manageable level  Description: Interventions:  - Assess and monitor patient's anxiety level.   - Monitor for signs and symptoms (heart palpitations, chest pain, shortness of breath, headaches, nausea, feeling jumpy, restlessness, irritable, apprehensive).   - Collaborate with interdisciplinary team and initiate plan and interventions as ordered.  - [place] patient to unit/surroundings  - Explain treatment plan  - Encourage participation in care  - Encourage verbalization of concerns/fears  - Identify coping mechanisms  - Assist in developing anxiety-reducing skills  - Administer/offer alternative therapies  - Limit or eliminate stimulants  Outcome: Progressing     Problem: Alteration in Orientation  Goal: Treatment Goal: Demonstrate a reduction of confusion and improved orientation to person, place, time and/or situation upon discharge, according to optimum baseline/ability  Outcome: Progressing  Goal: Interact with staff daily  Description: Interventions:  - Assess and re-assess patient's level of orientation  - Engage patient in 1 on 1 interactions, daily, for a minimum of 15 minutes   - Establish rapport/trust with patient   Outcome: Progressing  Goal: Express concerns related to confused thinking related to:  Description: Interventions:  - Encourage patient to express feelings, fears, frustrations, hopes  - Assign consistent caregivers   - [place]/re-orient patient as needed  - Allow comfort items, as  appropriate  - Provide visual cues, signs, etc.   Outcome: Progressing  Goal: Allow medical examinations, as recommended  Description: Interventions:  - Provide physical/neurological exams and/or referrals, per provider   Outcome: Progressing  Goal: Cooperate with recommended testing/procedures  Description: Interventions:  - Determine need for ancillary testing  - Observe for mental status changes  - Implement falls/precaution protocol   Outcome: Progressing  Goal: Attend and participate in unit activities, including therapeutic, recreational, and educational groups  Description: Interventions:  - Provide therapeutic and educational activities daily, encourage attendance and participation, and document same in the medical record   - Provide appropriate opportunities for reminiscence   - Provide a consistent daily routine   - Encourage family contact/visitation   Outcome: Progressing  Goal: Complete daily ADLs, including personal hygiene independently, as able  Description: Interventions:  - Observe, teach, and assist patient with ADLS  Outcome: Progressing     Problem: Individualized Interventions  Goal: Patient will verbalize appropriate use of telephone within 5 days  Description: Interventions:  - Treatment team to determine use of supervised phone privileges   Outcome: Progressing  Goal: Patient will verbalize need for hospitalization and will no longer attempt elopement within 5 days  Description: Interventions:  - Ongoing education to help patient understand need for hospitalization  Outcome: Progressing  Goal: Patient will recognize inappropriate behaviors and develop alternative behaviors within 5 days  Description: Interventions:  - Patient in collaboration with Treatment Team will develop a behavior management plan to help identify effective coping skills to deal with stressors  Outcome: Progressing     Problem: Electroconvulsive therapy (ECT)  Goal: Treatment Goal: Demonstrate a reduction of confusion  and improved orientation to person, place, time and/or situation upon discharge, according to optimum baseline/ability  Outcome: Progressing  Goal: Verbalizes/displays adequate comfort level or baseline comfort level  Description: Interventions:  - Encourage patient to monitor pain and request assistance  - Assess pain using appropriate pain scale  - Administer analgesics based on type and severity of pain and evaluate response  - Implement non-pharmacological measures as appropriate and evaluate response  - Consider cultural and social influences on pain and pain management  - Notify physician/advanced practitioner if interventions unsuccessful or patient reports new pain  Outcome: Progressing  Goal: Achieves stable or improved neurological status  Description: INTERVENTIONS  - Monitor and report changes in neurological status  - Monitor vital signs such as temperature, blood pressure, glucose, and any other labs ordered   - Initiate measures to prevent increased intracranial pressure  - Monitor for seizure activity and implement precautions if appropriate      Outcome: Progressing  Goal: Achieves maximal functionality and self care  Description: INTERVENTIONS  - Monitor swallowing and airway patency with patient fatigue and changes in neurological status  - Encourage and assist patient to increase activity and self care.   - Encourage visually impaired, hearing impaired and aphasic patients to use assistive/communication devices  Outcome: Progressing  Goal: Maintain or return mobility to safest level of function  Description: INTERVENTIONS:  - Assess patient's ability to carry out ADLs; assess patient's baseline for ADL function and identify physical deficits which impact ability to perform ADLs (bathing, care of mouth/teeth, toileting, grooming, dressing, etc.)  - Assess/evaluate cause of self-care deficits   - Assess range of motion  - Assess patient's mobility  - Assess patient's need for assistive devices and  provide as appropriate  - Encourage maximum independence but intervene and supervise when necessary  - Involve family in performance of ADLs  - Assess for home care needs following discharge   - Consider OT consult to assist with ADL evaluation and planning for discharge  - Provide patient education as appropriate  Outcome: Progressing  Goal: Absence of urinary retention  Description: INTERVENTIONS:  - Assess patient’s ability to void and empty bladder  - Monitor I/O  - Bladder scan as needed  - Discuss with physician/AP medications to alleviate retention as needed  - Discuss catheterization for long term situations as appropriate  Outcome: Progressing  Goal: Minimal or absence of nausea and/or vomiting  Description: INTERVENTIONS:  - Administer IV fluids if ordered to ensure adequate hydration  - Maintain NPO status until nausea and vomiting are resolved  - Nasogastric tube if ordered  - Administer ordered antiemetic medications as needed  - Provide nonpharmacologic comfort measures as appropriate  - Advance diet as tolerated, if ordered  - Consider nutrition services referral to assist patient with adequate nutrition and appropriate food choices  Outcome: Progressing  Goal: Maintains adequate nutritional intake  Description: INTERVENTIONS:  - Monitor percentage of each meal consumed  - Identify factors contributing to decreased intake, treat as appropriate  - Assist with meals as needed  - Monitor I&O, weight, and lab values if indicated  - Obtain nutrition services referral as needed  Outcome: Progressing     Problem: DISCHARGE PLANNING - CARE MANAGEMENT  Goal: Discharge to post-acute care or home with appropriate resources  Description: INTERVENTIONS:  - Conduct assessment to determine patient/family and health care team treatment goals, and need for post-acute services based on payer coverage, community resources, and patient preferences, and barriers to discharge  - Address psychosocial, clinical, and  financial barriers to discharge as identified in assessment in conjunction with the patient/family and health care team  - Arrange appropriate level of post-acute services according to patient’s   needs and preference and payer coverage in collaboration with the physician and health care team  - Communicate with and update the patient/family, physician, and health care team regarding progress on the discharge plan  - Arrange appropriate transportation to post-acute venues  Outcome: Progressing

## 2024-04-08 NOTE — NURSING NOTE
Alberto maintain on ongoing SAFE precaution without incident on this shift. Observed resting in bed with eyes closed, respiration even and unlabored. Continuous Q 7 minutes check implemented thru the night. No behavioral noted

## 2024-04-08 NOTE — NURSING NOTE
Alberto maintained on ongoing SAFE precautions without incident on this shift. Awake, alert, isolative to self, cooperative and pleasant upon approach. Attended and participated in 3 out 6 groups today. Continues to be compliant with meds and snack. Denies depression or anxiety.  No overt delusion or A/V/T Hallucination noted.  He is bright, relax and engaging with his peers.  Behavior control.

## 2024-04-08 NOTE — SOCIAL WORK
SW met with pt 1:1  Pt was pleasant and engaged.   Pt reports wanting to continue more ECT sessions than initially approved because he finds them very helpful. Reviewed current use of coping skills and assessed symptoms.   Pt expressed no concerns and reports feeling a little less down since he's been spending time with peers.   BRANDY informed pt of established virtual visit with his sister tonight at 6pm.

## 2024-04-08 NOTE — PROCEDURES
"Procedure Note - ECT  Alberto Berumen 28 y.o. male MRN: [id number]    LATE ENTRY- ERRONEOUSLY ENTERED AS PROGRESS NOTE ON 3/29/24. RECREATED BELOW.    Patient is doing fairly well today.  He remains blunted and scant in his interactions.  He does report some improvement with ECT.  He reports specifically feeling that ECT helps him \"get out of [his] head\".  He notes some memory impairment but finds that this does not bother him much.  He denies any headaches.  He denies any current SI or HI.  He reports auditory hallucinations but denies visual hallucinations.  Patient agreeable to proceed with ECT treatment today.    Time out was taken with staff to confirm correct patient and correct procedure to be performed.    Session Number: 013    Diagnosis: Principal Problem:    Schizoaffective disorder, bipolar type ([facility])  Active Problems:    GERD (gastroesophageal reflux disease)    Tobacco abuse    T wave inversion in EKG    Chronic idiopathic constipation    Vitamin B 12 deficiency    Vitamin D deficiency    Acanthosis nigricans    Rash    Class 2 obesity in adult      ECT Type: Inpatient, Acute    Anesthesia: Brevital    Electrode Placement: bilateral    Energy level: 100%      Seizure Duration     EE sec  EM sec observed, not detected by machine    PSI 90.2%     Results:Clinical seizure was satisfactory, Patient tolerated ECT well    Vitals:    24 0800   BP: 141/84   Pulse: 88   Resp: 12   Temp:    SpO2: 93%        Medication Administration - last 24 hours from 2024 0813 to 2024 0813         Date/Time Order Dose Route Action Action by     2024 2137 EDT nicotine polacrilex (NICORETTE) gum 4 mg 4 mg Oral Given Mavis Covarrubias RN     2024 1928 EDT nicotine polacrilex (NICORETTE) gum 4 mg 4 mg Oral Given Mavis Covarrubias RN     2024 1708 EDT nicotine polacrilex (NICORETTE) gum 4 mg 4 mg Oral Given Verona Wall RN     2024 1254 EDT nicotine polacrilex (NICORETTE) gum 4 " mg 4 mg Oral Given Verona Wall, RN     03/28/2024 2133 EDT melatonin tablet 3 mg 3 mg Oral Given Mavis Covarrubias, RN     03/28/2024 0822 EDT polyethylene glycol (MIRALAX) packet 17 g 17 g Oral Not Given Amira Jefferson, RN     03/28/2024 0821 EDT escitalopram (LEXAPRO) tablet 20 mg 20 mg Oral Given Amira Jefferson, RN     03/28/2024 0821 EDT metFORMIN (GLUCOPHAGE) tablet 500 mg 500 mg Oral Given Amira Jefferson, RN     03/28/2024 2134 EDT minocycline (MINOCIN) capsule 100 mg 100 mg Oral Given Mavis Covarrubias, RN     03/28/2024 0821 EDT minocycline (MINOCIN) capsule 100 mg 100 mg Oral Given Amira Jefferson, RN     03/28/2024 2133 EDT propranolol (INDERAL) tablet 10 mg 10 mg Oral Given Mavis Covarrubias, RN     03/28/2024 0821 EDT propranolol (INDERAL) tablet 10 mg 10 mg Oral Given Amira Jefferson, RN     03/28/2024 2135 EDT atropine (ISOPTO ATROPINE) 1 % ophthalmic solution 1 drop 1 drop Sublingual Given Mavisconsuelo Covarrubias, RN     03/28/2024 1708 EDT senna-docusate sodium (SENOKOT S) 8.6-50 mg per tablet 2 tablet 2 tablet Oral Given Verona Wall, RN     03/28/2024 0821 EDT senna-docusate sodium (SENOKOT S) 8.6-50 mg per tablet 2 tablet 2 tablet Oral Given Amira Jefferson, RN     03/28/2024 2133 EDT cloZAPine (CLOZARIL) tablet 450 mg 450 mg Oral Given Mavisconsuelo Covarrubias, RN     03/29/2024 0802 EDT lactated ringers infusion 0 mL/hr Intravenous Stopped Bhakti Flores, RN     03/28/2024 0821 EDT amLODIPine (NORVASC) tablet 5 mg 5 mg Oral Given Amira Jefferson RN             Media Information      Document Information    Clinical Image - Mobile Device   EEG   03/29/2024 07:32   Attached To:   Hospital Encounter on 11/14/23   Source Information    Guy Arroyo MD  [facility]     Guy Arroyo MD 04/08/24

## 2024-04-08 NOTE — PROCEDURES
"Procedure Note - ECT  Alberto Berumen 28 y.o. male MRN: [id number]    LATE ENTRY - ERRONEOUSLY ENTERED AS PROGRESS NOTE ON 3/29/24. REPRODUCED BELOW.    Patient is doing fairly well today.  He remains blunted and scant in his interactions.  He does report some improvement with ECT.  He reports specifically feeling that ECT helps him \"get out of [his] head\".  He notes some memory impairment but finds that this does not bother him much.  He denies any headaches.  He denies any current SI or HI.  He reports auditory hallucinations but denies visual hallucinations.  Patient agreeable to proceed with ECT treatment today.    Time out was taken with staff to confirm correct patient and correct procedure to be performed.    Session Number: 013    Diagnosis: Principal Problem:    Schizoaffective disorder, bipolar type ([facility])  Active Problems:    GERD (gastroesophageal reflux disease)    Tobacco abuse    T wave inversion in EKG    Chronic idiopathic constipation    Vitamin B 12 deficiency    Vitamin D deficiency    Acanthosis nigricans    Rash    Class 2 obesity in adult      ECT Type: Inpatient, Acute    Anesthesia: Brevital    Electrode Placement: bilateral    Energy level: 100%      Seizure Duration     EE sec  EM sec observed, not detected by machine    PSI 90.2%     Results:Clinical seizure was satisfactory, Patient tolerated ECT well    Vitals:    24 0800   BP: 141/84   Pulse: 88   Resp: 12   Temp:    SpO2: 93%        Medication Administration - last 24 hours from 2024 0813 to 2024 0813         Date/Time Order Dose Route Action Action by     2024 2137 EDT nicotine polacrilex (NICORETTE) gum 4 mg 4 mg Oral Given Mavis Covarrubias RN     2024 1928 EDT nicotine polacrilex (NICORETTE) gum 4 mg 4 mg Oral Given Mavis Covarrubias RN     2024 1708 EDT nicotine polacrilex (NICORETTE) gum 4 mg 4 mg Oral Given Verona Wall RN     2024 1254 EDT nicotine polacrilex (NICORETTE) gum " 4 mg 4 mg Oral Given Verona Wall, RN     03/28/2024 2133 EDT melatonin tablet 3 mg 3 mg Oral Given Mavis Covarrubias, RN     03/28/2024 0822 EDT polyethylene glycol (MIRALAX) packet 17 g 17 g Oral Not Given Amira Jefferson, RN     03/28/2024 0821 EDT escitalopram (LEXAPRO) tablet 20 mg 20 mg Oral Given Amira Jefferson, RN     03/28/2024 0821 EDT metFORMIN (GLUCOPHAGE) tablet 500 mg 500 mg Oral Given Amira Jefferson, RN     03/28/2024 2134 EDT minocycline (MINOCIN) capsule 100 mg 100 mg Oral Given Mavis Covarrubias, RN     03/28/2024 0821 EDT minocycline (MINOCIN) capsule 100 mg 100 mg Oral Given Amira Jefferson, RN     03/28/2024 2133 EDT propranolol (INDERAL) tablet 10 mg 10 mg Oral Given Mavis Covarrubias, RN     03/28/2024 0821 EDT propranolol (INDERAL) tablet 10 mg 10 mg Oral Given Amira Jefferson, RN     03/28/2024 2135 EDT atropine (ISOPTO ATROPINE) 1 % ophthalmic solution 1 drop 1 drop Sublingual Given Mavisconsuelo Covarrubias, RN     03/28/2024 1708 EDT senna-docusate sodium (SENOKOT S) 8.6-50 mg per tablet 2 tablet 2 tablet Oral Given Verona Wall, RN     03/28/2024 0821 EDT senna-docusate sodium (SENOKOT S) 8.6-50 mg per tablet 2 tablet 2 tablet Oral Given Amira Jefferson, RN     03/28/2024 2133 EDT cloZAPine (CLOZARIL) tablet 450 mg 450 mg Oral Given Mavisconsuelo Covarrubias, RN     03/29/2024 0802 EDT lactated ringers infusion 0 mL/hr Intravenous Stopped Bhakti Flores, RN     03/28/2024 0821 EDT amLODIPine (NORVASC) tablet 5 mg 5 mg Oral Given Amira Jefferson RN             Media Information      Document Information    Clinical Image - Mobile Device   EEG   03/29/2024 07:32   Attached To:   Hospital Encounter on 11/14/23   Source Information    Guy Arroyo MD  [facility]     Guy Arroyo MD 04/08/24

## 2024-04-08 NOTE — NURSING NOTE
Visible on unit after lunch time due to feeling tired.  CBC lab work obtained and sent to lab.  Appetite fair.  Admits having racing thoughts and auditory hallucination of a boys voice stating they want to kill him.  Reassured and support offered. Medication compliant. Denies suicidal/homicidal ideations. ANC- 5.39. Hopeful for ECT treatments, he stated. Affect flat. Suspicious affect. Minimal socialization with peers.

## 2024-04-09 PROCEDURE — 99232 SBSQ HOSP IP/OBS MODERATE 35: CPT | Performed by: PSYCHIATRY & NEUROLOGY

## 2024-04-09 RX ADMIN — NICOTINE POLACRILEX 2 MG: 2 GUM, CHEWING BUCCAL at 14:03

## 2024-04-09 RX ADMIN — CYANOCOBALAMIN TAB 1000 MCG 1000 MCG: 1000 TAB at 08:08

## 2024-04-09 RX ADMIN — PROPRANOLOL HYDROCHLORIDE 10 MG: 10 TABLET ORAL at 21:17

## 2024-04-09 RX ADMIN — ESCITALOPRAM OXALATE 20 MG: 10 TABLET, FILM COATED ORAL at 08:09

## 2024-04-09 RX ADMIN — CLOZAPINE 450 MG: 25 TABLET ORAL at 21:17

## 2024-04-09 RX ADMIN — MINOCYCLINE HYDROCHLORIDE 100 MG: 100 CAPSULE ORAL at 21:16

## 2024-04-09 RX ADMIN — NICOTINE POLACRILEX 2 MG: 2 GUM, CHEWING BUCCAL at 08:09

## 2024-04-09 RX ADMIN — ATROPINE SULFATE 1 DROP: 10 SOLUTION/ DROPS OPHTHALMIC at 21:16

## 2024-04-09 RX ADMIN — ERGOCALCIFEROL 50000 UNITS: 1.25 CAPSULE, LIQUID FILLED ORAL at 08:07

## 2024-04-09 RX ADMIN — SENNOSIDES AND DOCUSATE SODIUM 2 TABLET: 8.6; 5 TABLET ORAL at 17:13

## 2024-04-09 RX ADMIN — NICOTINE POLACRILEX 2 MG: 2 GUM, CHEWING BUCCAL at 17:13

## 2024-04-09 RX ADMIN — NICOTINE POLACRILEX 2 MG: 2 GUM, CHEWING BUCCAL at 19:18

## 2024-04-09 RX ADMIN — MELATONIN TAB 3 MG 3 MG: 3 TAB at 21:17

## 2024-04-09 RX ADMIN — PROPRANOLOL HYDROCHLORIDE 10 MG: 10 TABLET ORAL at 08:08

## 2024-04-09 RX ADMIN — SENNOSIDES AND DOCUSATE SODIUM 2 TABLET: 8.6; 5 TABLET ORAL at 08:08

## 2024-04-09 RX ADMIN — MINOCYCLINE HYDROCHLORIDE 100 MG: 100 CAPSULE ORAL at 08:07

## 2024-04-09 RX ADMIN — NICOTINE POLACRILEX 2 MG: 2 GUM, CHEWING BUCCAL at 21:19

## 2024-04-09 RX ADMIN — POLYETHYLENE GLYCOL 3350 17 G: 17 POWDER, FOR SOLUTION ORAL at 08:10

## 2024-04-09 RX ADMIN — AMLODIPINE BESYLATE 5 MG: 5 TABLET ORAL at 08:08

## 2024-04-09 RX ADMIN — METFORMIN HYDROCHLORIDE 500 MG: 500 TABLET ORAL at 08:07

## 2024-04-09 NOTE — PROGRESS NOTES
Psychiatry Progress Note [facility]    Alberto Berumen 28 y.o. male MRN: [id number]  Unit/Bed#: [facility] 107-01 Encounter: [id number]  Code Status: Level 1 - Full Code    PCP: Joce Juan MD    Date of Admission:  3/29/2024 2008   Date of Service:  04/09/24    Patient Active Problem List   Diagnosis    GERD (gastroesophageal reflux disease)    Medical clearance for psychiatric admission    Schizoaffective disorder, bipolar type (HCC)    Tobacco abuse    T wave inversion in EKG    Syringoma    Chronic idiopathic constipation    Vitamin B 12 deficiency    Vitamin D deficiency    Confluent and reticulate papillomatosis    Class 2 obesity in adult    Primary hypertension    Elevated hemoglobin A1c     Review of systems: Unremarkable  diagnosis: Schizoaffective bipolar    Assessment  Overall Status: Still hears voices threatening to kill him and his family resides are better since ECT is restarted and he was asking for more ECTs.  I told him that I would generally give him maintenance ECTs once a week..  Still not attending groups laying in bed most of the time and grooming is not great not interacting much with peers  Certification Statement: The patient will continue to require additional inpatient hospital stay due to ongoing voices that are threatening to kill him and his family lack of response to medications and ECT so far.  Still hears voices that are threatening to kill him but not commanding.  His aunt is now planning to move out to [place] and therefore he needs to possibly go into a supervised setting like a group home     Medications: Clozapine 450 mg at bedtime, propranolol 10 mg every 12 hours as as needed for anxiety, Lexapro 20 mg once a day, atropine eyedrops sublingual for drooling of saliva and senna 2 tablets twice a day for constipation  All medications reviewed and I recommend they be continued for symptom management side effects from treatment: None reported  Medication  changes   None at this time    Medication education   Risks side effects benefits and precautions of medications discussed with patient and he did verbalize an understanding about risks for metabolic syndrome from being on neuroleptics and is form tardive dyskinesia etc. and special precautions about being on clozapine   Understanding of medications: Has some understanding   Justification for dual anti-psychotics: Not applicable    Non-pharmacological treatments  Continue with individual, group, milieu and occupational therapy using recovery principles and psycho-education about accepting illness and the need for treatment.   to contact aunt and explore ACT team referral which he never had  ECT to be continued 2 more times on Wed & Friday for a total of 15 followed by weekly maintenance ECTs    Safety  Safety and communication plan established to target dynamic risk factors discussed above.    Discharge Plan   Back to his aunt with an ACT team    Interval Progress   Continues to claim to hear the same threatening voices him and that he and his family are going to be killed and they are not commanding at all according to him.  He continues to report that ECT is still help and was asking for more but I told him that he already received 14 ECTs and will now be getting maintenance ECTs once a week that should help.  Compliant with medications.  Isolated staying on bed hardly attending any groups but does eat meals.  Appears paranoid preoccupied and is aloof and cold with minimal interaction with peers.  Still not able to verify if his aunt is really moving away to [place] and he could not connect with his sister by video call yesterday as scheduled.  Has not been aggressive or agitated or threatening or demanding on the unit   Acceptance by patient: Accepting  Hopefulness in recovery: Living with his aunt  Involved in reintegration process: Talking with his sister and his aunt  Trusting in relationship  with psychiatrist: Appears to trust  Sleep: Good  Appetite: Good  Compliance with Medications: Compliant  Group attendance: attended groups 4/7  Significant events: Still With Same Threatening Voices that tell him they are going to kill him and his family hardly attending groups    Mental Status Exam  Appearance: age appropriate, improved grooming, looks older than stated age, overweight, with hair and dreadlocks casually dressed fairly groomed with good eye contact found resting on bed in his room under the covers but readily got up when approached with good eye contact   behavior: cooperative, mildly anxious, evasive, gesturing, slow responses preoccupied internally attuned  Speech: normal rate, normal volume, normal pitch  Mood: dysphoric, anxious, reports he feels good  Affect: constricted, inappropriate, mood-congruent  Thought Process: organized, logical, coherent, goal directed, linear, decreased rate of thoughts, slowing of thoughts, negative thinking, impaired abstract reasoning, concrete  Thought Content: paranoid ideation, some paranoia, grandiose ideas, intrusive thoughts, preoccupied, chronic,   no current suicidal or homicidal thoughts and no plans verbalized.  No overt delusions elicited today but appears paranoid.  No phobias obsessions compulsions or distorted body perceptions noted  Perceptual Disturbances: auditory hallucinations, appears preoccupied, appears responding to internal stimuli, not observed, reports voices tell him that he and his family are going to be killed sometimes from 1 person on more than one person talking to him claiming he has not seen much difference other than a little bit subdued with the ECT.  No current suicidal or homicidal thoughts and no plans verbalized.  No phobias obsessions compulsions or distorted body perceptions reported.  Hx Risk Factors: chronic psychiatric problems, chronic anxiety symptoms, chronic psychotic symptoms, his aunt possibly moving  away  Sensorium: Alert and oriented x 3 spheres and situation  cognition: recent and remote memory grossly intact  Consciousness: alert and awake  Attention: attention span and concentration are age appropriate  Intellect: appears to be of average intelligence  Insight: intact  Judgement: intact  Motor Activity: no abnormal movements     Vitals  Temp:  [97.8 °F (36.6 °C)] 97.8 °F (36.6 °C)  HR:  [] 106  Resp:  [18] 18  BP: (111-139)/(62-89) 139/86  SpO2:  [98 %] 98 %    Intake/Output Summary (Last 24 hours) at 4/9/2024 0501  Last data filed at 4/8/2024 1118  Gross per 24 hour   Intake 0 ml   Output --   Net 0 ml         Lab Results: All Labs For Current Hospital Admission Reviewed ANC on 4/2/24 was 5650, rest of labs OK but Vit D level is  low      Current Facility-Administered Medications   Medication Dose Route Frequency Provider Last Rate    acetaminophen  650 mg Oral Q6H PRN Jordan C Holter, DO      acetaminophen  650 mg Oral Q4H PRN Jordan C Holter, DO      acetaminophen  975 mg Oral Q6H PRN Jordan C Holter, DO      aluminum-magnesium hydroxide-simethicone  30 mL Oral Q4H PRN Jordan C Holter, DO      amLODIPine  5 mg Oral Daily HOLLI Lion      Artificial Tears  1 drop Both Eyes Q3H PRN Jordan C Holter, DO      atropine  1 drop Sublingual HS HOLLI Lion      atropine  1 drop Sublingual Daily PRN HOLLI Lion      benztropine  1 mg Intramuscular Q4H PRN Max 6/day Jordan C Holter, DO      benztropine  1 mg Oral Q4H PRN Max 6/day HOLLI Lion      benztropine  1 mg Oral Q4H PRN Max 6/day Jordan C Holter, DO      cloZAPine  450 mg Oral HS HOLLI Lion      cyanocobalamin  1,000 mcg Oral Daily HOLLI Galvan      hydrOXYzine HCL  50 mg Oral Q6H PRN Max 4/day Jordan C Holter,       Or    diphenhydrAMINE  50 mg Intramuscular Q6H PRN Jordan C Holter, DO      diphenhydrAMINE-zinc acetate   Topical BID PRN HOLLI Lion      ergocalciferol  50,000 Units Oral Weekly  HOLLI Galvan      escitalopram  20 mg Oral Daily HOLLI Lion      hydrocortisone   Topical 4x Daily PRN HOLLI Lion      hydrOXYzine HCL  100 mg Oral Q6H PRN Max 4/day [facility] Holter, DO      Or    LORazepam  2 mg Intramuscular Q6H PRN [facility] Holter, DO      hydrOXYzine HCL  25 mg Oral Q6H PRN Max 4/day [facility] Holter, DO      ibuprofen  600 mg Oral Q8H PRN HOLLI Lion      melatonin  3 mg Oral HS [facility] Holter, DO      metFORMIN  500 mg Oral Daily With Breakfast HOLLI Lion      methocarbamol  500 mg Oral Q6H PRN HOLLI Lion      minocycline  100 mg Oral Q12H KAYLIE HOLLI Lion      nicotine polacrilex  2 mg Oral Q2H PRN [facility] Holter, DO      OLANZapine  5 mg Oral Q4H PRN Max 3/day [facility] Holter, DO      Or    OLANZapine  2.5 mg Intramuscular Q4H PRN Max 3/day [facility] Holter, DO      OLANZapine  5 mg Oral Q3H PRN Max 3/day [facility] Holter, DO      Or    OLANZapine  5 mg Intramuscular Q3H PRN Max 3/day [facility] Holter, DO      OLANZapine  2.5 mg Oral Q4H PRN Max 6/day [facility] Holter, DO      ondansetron  4 mg Oral Q6H PRN HOLLI Lion      polyethylene glycol  17 g Oral Daily HOLLI Lion      polyethylene glycol  17 g Oral Daily PRN [facility] Holter, DO      propranolol  10 mg Oral Q12H [facility] HOLLI Lion      senna-docusate sodium  1 tablet Oral Daily PRN [facility] Holter, DO      senna-docusate sodium  2 tablet Oral BID HOLLI Lion      white petrolatum-mineral oil   Topical TID PRN HOLLI Lion         Counseling / Coordination of Care: Total floor / unit time spent today 15 minutes. Greater than 50% of total time was spent with the patient and / or family counseling and / or somewhat receptive to supportive listening and teaching positive coping skills to deal with symptom mangement.     Patient's Rights, confidentiality and exceptions to confidentiality, use of automated medical record, [facility] staff  access to medical record, and consent to treatment reviewed.    This note has been dictated and hence there may be problems with punctuation, spelling and formatting and if anyone has any concerns please address them to Dr. Rosario   This note is not shared with patient due to potential for making patient's condition worse by knowing the content of the note.

## 2024-04-09 NOTE — PROGRESS NOTES
04/09/24 0795   Team Meeting   Meeting Type Daily Rounds   Team Members Present   Team Members Present Physician;Nurse;;Other (Discipline and Name)   Patient/Family Present   Patient Present No   Patient's Family Present No     In attendance:  Dr. Alex Thomas, MD Dr. Jordan Holter, HOLLI Weiss, RN  Luisana Alvarado, [facility]  Carla Lux, [facility]    Groups: 4/7    Pt has been more visible and engaged. Pt reports racing thoughts and auditory hallucinations. Pt reportedly wants to continue ECT treatments. No bx issues noted.

## 2024-04-09 NOTE — SOCIAL WORK
BRANDY emailed sister Marleny Christianson to inquire about any updates regarding pt's aunt moving.

## 2024-04-09 NOTE — NURSING NOTE
Pt visible on unit. Pt reports depression and AH that continue to threaten to kill him and his family. Support provided. Denies SI/HI/VH and anxiety. Took medications without incident. Consumed 100% of breakfast and refused lunch. No behavioral issues. Will maintain q7 min checks.

## 2024-04-09 NOTE — NURSING NOTE
Pt is intermittently visible but mostly isolative to self and room. Consumed 100% of dinner. Took medications without incidence. Pt is pleasant and cooperative. Alberto was happy to have a virutal visit with his aunt this evening, which went well. Attended most groups. Reports AH that threaten him and his family, denies other psych symptoms. Reassurance provided. No behavioral issues. Pt offers no concerns or complaints. VSS. Continuous safety checks maintained.

## 2024-04-09 NOTE — NURSING NOTE
Alberto maintain on ongoing SAFE precaution without incident on this shift. Observed resting in bed with eyes closed, respiration even and unlabored. Continuous Q 7 minutes check implemented thru the night. No behavioral noted     New order:acknowledged and noted  Q Mondays starting 4/15/24  Clozapine  CK  Troponin  C-reactive protein  BNP  Modified: CBC/ Diff Q Mon 4/15/24-  5/20/24

## 2024-04-09 NOTE — PLAN OF CARE
Problem: Alteration in Thoughts and Perception  Goal: Treatment Goal: Gain control of psychotic behaviors/thinking, reduce/eliminate presenting symptoms and demonstrate improved reality functioning upon discharge  Outcome: Progressing  Goal: Verbalize thoughts and feelings  Description: Interventions:  - Promote a nonjudgmental and trusting relationship with the patient through active listening and therapeutic communication  - Assess patient's level of functioning, behavior and potential for risk  - Engage patient in 1 on 1 interactions  - Encourage patient to express fears, feelings, frustrations, and discuss symptoms    - [place] patient to reality, help patient recognize reality-based thinking   - Administer medications as ordered and assess for potential side effects  - Provide the patient education related to the signs and symptoms of the illness and desired effects of prescribed medications  Outcome: Progressing  Goal: Refrain from acting on delusional thinking/internal stimuli  Description: Interventions:  - Monitor patient closely, per order   - Utilize least restrictive measures   - Set reasonable limits, give positive feedback for acceptable   - Administer medications as ordered and monitor of potential side effects  Outcome: Progressing  Goal: Agree to be compliant with medication regime, as prescribed and report medication side effects  Description: Interventions:  - Offer appropriate PRN medication and supervise ingestion; conduct AIMS, as needed   Outcome: Progressing  Goal: Attend and participate in unit activities, including therapeutic, recreational, and educational groups  Description: Interventions:  -Encourage Visitation and family involvement in care  Outcome: Progressing  Goal: Complete daily ADLs, including personal hygiene independently, as able  Description: Interventions:  - Observe, teach, and assist patient with ADLS  - Monitor and promote a balance of rest/activity, with adequate  nutrition and elimination   Outcome: Progressing     Problem: Ineffective Coping  Goal: Demonstrates healthy coping skills  Outcome: Progressing  Goal: Participates in unit activities  Description: Interventions:  - Provide therapeutic environment   - Provide required programming   - Redirect inappropriate behaviors   Outcome: Progressing     Problem: Depression  Goal: Refrain from harming self  Description: Interventions:  - Monitor patient closely, per order   - Supervise medication ingestion, monitor effects and side effects   Outcome: Progressing  Goal: Refrain from isolation  Description: Interventions:  - Develop a trusting relationship   - Encourage socialization   Outcome: Progressing  Goal: Refrain from self-neglect  Outcome: Progressing     Problem: Anxiety  Goal: Anxiety is at manageable level  Description: Interventions:  - Assess and monitor patient's anxiety level.   - Monitor for signs and symptoms (heart palpitations, chest pain, shortness of breath, headaches, nausea, feeling jumpy, restlessness, irritable, apprehensive).   - Collaborate with interdisciplinary team and initiate plan and interventions as ordered.  - [place] patient to unit/surroundings  - Explain treatment plan  - Encourage participation in care  - Encourage verbalization of concerns/fears  - Identify coping mechanisms  - Assist in developing anxiety-reducing skills  - Administer/offer alternative therapies  - Limit or eliminate stimulants  Outcome: Progressing     Problem: Alteration in Orientation  Goal: Treatment Goal: Demonstrate a reduction of confusion and improved orientation to person, place, time and/or situation upon discharge, according to optimum baseline/ability  Outcome: Progressing  Goal: Interact with staff daily  Description: Interventions:  - Assess and re-assess patient's level of orientation  - Engage patient in 1 on 1 interactions, daily, for a minimum of 15 minutes   - Establish rapport/trust with patient    Outcome: Progressing  Goal: Cooperate with recommended testing/procedures  Description: Interventions:  - Determine need for ancillary testing  - Observe for mental status changes  - Implement falls/precaution protocol   Outcome: Progressing  Goal: Attend and participate in unit activities, including therapeutic, recreational, and educational groups  Description: Interventions:  - Provide therapeutic and educational activities daily, encourage attendance and participation, and document same in the medical record   - Provide appropriate opportunities for reminiscence   - Provide a consistent daily routine   - Encourage family contact/visitation   Outcome: Progressing     Problem: Electroconvulsive therapy (ECT)  Goal: Treatment Goal: Demonstrate a reduction of confusion and improved orientation to person, place, time and/or situation upon discharge, according to optimum baseline/ability  Outcome: Progressing     Problem: DISCHARGE PLANNING - CARE MANAGEMENT  Goal: Discharge to post-acute care or home with appropriate resources  Description: INTERVENTIONS:  - Conduct assessment to determine patient/family and health care team treatment goals, and need for post-acute services based on payer coverage, community resources, and patient preferences, and barriers to discharge  - Address psychosocial, clinical, and financial barriers to discharge as identified in assessment in conjunction with the patient/family and health care team  - Arrange appropriate level of post-acute services according to patient’s   needs and preference and payer coverage in collaboration with the physician and health care team  - Communicate with and update the patient/family, physician, and health care team regarding progress on the discharge plan  - Arrange appropriate transportation to post-acute venues  Outcome: Progressing

## 2024-04-09 NOTE — SOCIAL WORK
TC to aunt- left voicemail requesting return call    Aunt returned call and asked for update on pt.   Aunt reports that she is trying to move but it will not be for a while. Aunt is happy to have pt return home if he has appropriate supports. SW provided info on ACT team and aunt stated that would be perfect. Aunt reports she would ensure there would be appropriate supports wherever she would move to.   Aunt requested virtual visit and set a date a few weeks out for in person visit.

## 2024-04-09 NOTE — NURSING NOTE
"Alberto reports that he is still hearing voices and that they are still telling him that they are going to kill him.  Reminded pt that he is on a locked unit and that staff is rounding on all patients constantly.    Pt had a virtual visit set up.  This nurse pressed the \"join now\".  The connection was not successful.  Pt was ok and did not act out.  Requested nicotine gum. Consumed 100% of dinner.  Safety precautions ongoing.  "

## 2024-04-10 PROCEDURE — 99232 SBSQ HOSP IP/OBS MODERATE 35: CPT | Performed by: PSYCHIATRY & NEUROLOGY

## 2024-04-10 RX ORDER — ONDANSETRON 2 MG/ML
4 INJECTION INTRAMUSCULAR; INTRAVENOUS ONCE AS NEEDED
Status: CANCELLED | OUTPATIENT
Start: 2024-04-10

## 2024-04-10 RX ORDER — SODIUM CHLORIDE, SODIUM LACTATE, POTASSIUM CHLORIDE, CALCIUM CHLORIDE 600; 310; 30; 20 MG/100ML; MG/100ML; MG/100ML; MG/100ML
100 INJECTION, SOLUTION INTRAVENOUS CONTINUOUS
Status: CANCELLED | OUTPATIENT
Start: 2024-04-10

## 2024-04-10 RX ORDER — ALBUTEROL SULFATE 2.5 MG/3ML
2.5 SOLUTION RESPIRATORY (INHALATION) ONCE AS NEEDED
Status: CANCELLED | OUTPATIENT
Start: 2024-04-10

## 2024-04-10 RX ADMIN — CLOZAPINE 450 MG: 25 TABLET ORAL at 21:25

## 2024-04-10 RX ADMIN — MINOCYCLINE HYDROCHLORIDE 100 MG: 100 CAPSULE ORAL at 08:25

## 2024-04-10 RX ADMIN — NICOTINE POLACRILEX 2 MG: 2 GUM, CHEWING BUCCAL at 17:32

## 2024-04-10 RX ADMIN — CYANOCOBALAMIN TAB 1000 MCG 1000 MCG: 1000 TAB at 08:25

## 2024-04-10 RX ADMIN — NICOTINE POLACRILEX 2 MG: 2 GUM, CHEWING BUCCAL at 19:33

## 2024-04-10 RX ADMIN — SENNOSIDES AND DOCUSATE SODIUM 2 TABLET: 8.6; 5 TABLET ORAL at 17:11

## 2024-04-10 RX ADMIN — PROPRANOLOL HYDROCHLORIDE 10 MG: 10 TABLET ORAL at 21:26

## 2024-04-10 RX ADMIN — SENNOSIDES AND DOCUSATE SODIUM 2 TABLET: 8.6; 5 TABLET ORAL at 08:25

## 2024-04-10 RX ADMIN — PROPRANOLOL HYDROCHLORIDE 10 MG: 10 TABLET ORAL at 08:25

## 2024-04-10 RX ADMIN — POLYETHYLENE GLYCOL 3350 17 G: 17 POWDER, FOR SOLUTION ORAL at 08:49

## 2024-04-10 RX ADMIN — MELATONIN TAB 3 MG 3 MG: 3 TAB at 21:26

## 2024-04-10 RX ADMIN — METFORMIN HYDROCHLORIDE 500 MG: 500 TABLET ORAL at 08:25

## 2024-04-10 RX ADMIN — ESCITALOPRAM OXALATE 20 MG: 10 TABLET, FILM COATED ORAL at 08:25

## 2024-04-10 RX ADMIN — ATROPINE SULFATE 1 DROP: 10 SOLUTION/ DROPS OPHTHALMIC at 21:25

## 2024-04-10 RX ADMIN — NICOTINE POLACRILEX 2 MG: 2 GUM, CHEWING BUCCAL at 12:31

## 2024-04-10 RX ADMIN — NICOTINE POLACRILEX 2 MG: 2 GUM, CHEWING BUCCAL at 21:34

## 2024-04-10 RX ADMIN — AMLODIPINE BESYLATE 5 MG: 5 TABLET ORAL at 08:25

## 2024-04-10 RX ADMIN — MINOCYCLINE HYDROCHLORIDE 100 MG: 100 CAPSULE ORAL at 21:26

## 2024-04-10 RX ADMIN — NICOTINE POLACRILEX 2 MG: 2 GUM, CHEWING BUCCAL at 08:49

## 2024-04-10 NOTE — PROGRESS NOTES
Psychiatry Progress Note [facility]    Alberto Berumen 28 y.o. male MRN: [id number]  Unit/Bed#: [facility] 107-01 Encounter: [id number]  Code Status: Level 1 - Full Code    PCP: Joce Juan MD    Date of Admission:  3/29/2024 2008   Date of Service:  04/10/24    Patient Active Problem List   Diagnosis    GERD (gastroesophageal reflux disease)    Medical clearance for psychiatric admission    Schizoaffective disorder, bipolar type (HCC)    Tobacco abuse    T wave inversion in EKG    Syringoma    Chronic idiopathic constipation    Vitamin B 12 deficiency    Vitamin D deficiency    Confluent and reticulate papillomatosis    Class 2 obesity in adult    Primary hypertension    Elevated hemoglobin A1c     Review of systems: Unremarkable  diagnosis: Schizoaffective bipolar    Assessment  Overall Status: Continues to hear threatening voices that want to kill him and his family and he states they are less often since ECTs were started.  Still hardly comes out except for meals and may be a few groups here and there but stays to himself on his bed in his room most of the time hearing suspicious internally preoccupied with no good mood reactivity compliant with medications with no aggressive or self-abusive episodes  certification Statement: The patient will continue to require additional inpatient hospital stay due to ongoing voices that are threatening to kill him and his family lack of response to medications and ECT so far.  Still hears voices that are threatening to kill him but not commanding.  His aunt is now planning to move out to [place] and therefore he needs to possibly go into a supervised setting like a group home     Medications: Clozapine 450 mg at bedtime, propranolol 10 mg every 12 hours as as needed for anxiety, Lexapro 20 mg once a day, atropine eyedrops sublingual for drooling of saliva and senna 2 tablets twice a day for constipation  All medications were reviewed and are recommended  to be continued for symptom management side effects from treatment: None reported  Medication changes   None at this time to consider adding Risperdal as an adjunct with the clozapine on board for treatment resistant schizophrenia   Medication education   Risks side effects benefits and precautions of medications discussed with patient and he did verbalize an understanding about risks for metabolic syndrome from being on neuroleptics and is form tardive dyskinesia etc. and special precautions about being on clozapine   Understanding of medications: Has some understanding   Justification for dual anti-psychotics: Not applicable    Non-pharmacological treatments  Continue with individual, group, milieu and occupational therapy using recovery principles and psycho-education about accepting illness and the need for treatment.   to contact aunt and explore ACT team referral which he never had  ECT to be continued 2 more times on Wed & Friday for a total of 15 followed by weekly maintenance ECTs    Safety  Safety and communication plan established to target dynamic risk factors discussed above.    Discharge Plan   Back to his aunt with an ACT team    Interval Progress   His aunt finally called the  and told her that she she still intends to take him back and it may be a while before she moves to wherever she plans to move but wants to make sure there are adequate supports for his recovery wherever she moves with him he was also able to talk with her yesterday on the phone.  Will get maintenance weekly ECTs as of this Friday.  Compliant with medications.  Appears paranoid preoccupied and aloof and cold in interaction socially isolated staying on his bed in his room but comes out for some groups and eats most of the meals.  Appearing paranoid and preoccupied and still hears voices that threaten to kill him and his family and not at all commanding him to hurt himself or others.  Not been aggressive  or agitated or threatening demanding on the unit and has not needed any behavioral PRNs   Acceptance by patient: Accepting  Hopefulness in recovery: Back with his aunt  Involved in reintegration process: Talking with his sister and aunt  Trusting in relationship with psychiatrist: Trusting  Sleep: Good  Appetite: Good  Compliance with Medications: Compliant  Group attendance: About half of the groups 5/9  Significant events: Continues to hear same threatening voices that tell him they are going to kill him and his family and not commanding at all and still preoccupied spending a lot of time on his bed in his room    Mental Status Exam  Appearance: age appropriate, improved grooming, looks older than stated age, overweight, with hair and dreadlocks casually dressed with a clean shaven face, fairly groomed with good eye contact found wide awake and sitting in the dining bautista when approached with good eye contact  behavior: cooperative, mildly anxious, evasive, gesturing, slow responses preoccupied internally attuned  Speech: normal rate, normal volume, normal pitch  Mood: dysphoric, anxious, reports he feels good  Affect: constricted, inappropriate, mood-congruent  Thought Process: organized, logical, coherent, goal directed, linear, decreased rate of thoughts, slowing of thoughts, negative thinking, impaired abstract reasoning, concrete  Thought Content: paranoid ideation, some paranoia, grandiose ideas, intrusive thoughts, preoccupied, chronic,   no current suicidal or homicidal thoughts and no plans verbalized.  No overt delusions elicited today but appears paranoid as usual.  No phobias obsessions compulsions or distorted body perceptions reported.  Perceptual Disturbances: auditory hallucinations, appears preoccupied, appears responding to internal stimuli, not observed, reports voices tell him that he and his family are going to be killed sometimes from 1 person on more than one person talking to him claiming he  has not seen much difference other than a little bit subdued with the ECT.  No current suicidal or homicidal thoughts and no plans verbalized.  No phobias obsessions compulsions or distorted body perceptions reported.  Hx Risk Factors: chronic psychiatric problems, chronic anxiety symptoms, chronic psychotic symptoms, his aunt possibly moving away  Sensorium: Alert and oriented x 3 spheres and situation  cognition: recent and remote memory grossly intact  Consciousness: alert and awake  Attention: attention span and concentration are age appropriate  Intellect: appears to be of average intelligence  Insight: intact  Judgement: intact  Motor Activity: no abnormal movements     Vitals  Temp:  [97.5 °F (36.4 °C)-97.8 °F (36.6 °C)] 97.5 °F (36.4 °C)  HR:  [] 100  Resp:  [18] 18  BP: (135-140)/(79-86) 138/79  SpO2:  [99 %-100 %] 99 %  No intake or output data in the 24 hours ending 04/10/24 0516        Lab Results: All Labs For Current Hospital Admission Reviewed ANC on 4/2/24 was 5650, rest of labs OK but Vit D level is  low      Current Facility-Administered Medications   Medication Dose Route Frequency Provider Last Rate    acetaminophen  650 mg Oral Q6H PRN Jordan C Holter, DO      acetaminophen  650 mg Oral Q4H PRN Jordan C Holter, DO      acetaminophen  975 mg Oral Q6H PRN Jordan C Holter, DO      aluminum-magnesium hydroxide-simethicone  30 mL Oral Q4H PRN Jordan C Holter, DO      amLODIPine  5 mg Oral Daily HOLLI Lion      Artificial Tears  1 drop Both Eyes Q3H PRN Jordan C Holter, DO      atropine  1 drop Sublingual HS HOLLI Lion      atropine  1 drop Sublingual Daily PRN HOLLI Lion      benztropine  1 mg Intramuscular Q4H PRN Max 6/day Jordan C Holter, DO      benztropine  1 mg Oral Q4H PRN Max 6/day HOLLI Lion      benztropine  1 mg Oral Q4H PRN Max 6/day Jordan C Holter, DO      cloZAPine  450 mg Oral HS HOLLI Lion      cyanocobalamin  1,000 mcg Oral Daily Pia  HOLLI Mantilla      hydrOXYzine HCL  50 mg Oral Q6H PRN Max 4/day [facility] Holter, DO      Or    diphenhydrAMINE  50 mg Intramuscular Q6H PRN [facility] Holter, DO      diphenhydrAMINE-zinc acetate   Topical BID PRN HOLLI Lion      ergocalciferol  50,000 Units Oral Weekly HOLLI Galvan      escitalopram  20 mg Oral Daily HOLLI Lion      hydrocortisone   Topical 4x Daily PRN HOLLI Lion      hydrOXYzine HCL  100 mg Oral Q6H PRN Max 4/day [facility] Holter, DO      Or    LORazepam  2 mg Intramuscular Q6H PRN [facility] Holter, DO      hydrOXYzine HCL  25 mg Oral Q6H PRN Max 4/day [facility] Holter, DO      ibuprofen  600 mg Oral Q8H PRN HOLLI Lion      melatonin  3 mg Oral HS [facility] Holter, DO      metFORMIN  500 mg Oral Daily With Breakfast HOLLI Lion      methocarbamol  500 mg Oral Q6H PRN HOLLI Lion      minocycline  100 mg Oral Q12H [facility] HOLLI Lion      nicotine polacrilex  2 mg Oral Q2H PRN [facility] Holter, DO      OLANZapine  5 mg Oral Q4H PRN Max 3/day [facility] Holter, DO      Or    OLANZapine  2.5 mg Intramuscular Q4H PRN Max 3/day [facility] Holter, DO      OLANZapine  5 mg Oral Q3H PRN Max 3/day [facility] Holter, DO      Or    OLANZapine  5 mg Intramuscular Q3H PRN Max 3/day [facility] Holter, DO      OLANZapine  2.5 mg Oral Q4H PRN Max 6/day [facility] Holter, DO      ondansetron  4 mg Oral Q6H PRN HOLLI Lion      polyethylene glycol  17 g Oral Daily HOLLI Lion      polyethylene glycol  17 g Oral Daily PRN [facility] Holter, DO      propranolol  10 mg Oral Q12H [facility] HOLLI Lion      senna-docusate sodium  1 tablet Oral Daily PRN [facility] Holter, DO      senna-docusate sodium  2 tablet Oral BID HOLLI Lion      white petrolatum-mineral oil   Topical TID PRN HOLLI Lion         Counseling / Coordination of Care: Total floor / unit time spent today 15 minutes. Greater than 50% of total time was spent with the patient and  / or family counseling and / or somewhat receptive to supportive listening and teaching positive coping skills to deal with symptom mangement.     Patient's Rights, confidentiality and exceptions to confidentiality, use of automated medical record, Behavioral [facility] Services staff access to medical record, and consent to treatment reviewed.    This note has been dictated and hence there may be problems with punctuation, spelling and formatting and if anyone has any concerns please address them to Dr. Rosario   This note is not shared with patient due to potential for making patient's condition worse by knowing the content of the note.

## 2024-04-10 NOTE — PLAN OF CARE
Problem: Alteration in Thoughts and Perception  Goal: Verbalize thoughts and feelings  Description: Interventions:  - Promote a nonjudgmental and trusting relationship with the patient through active listening and therapeutic communication  - Assess patient's level of functioning, behavior and potential for risk  - Engage patient in 1 on 1 interactions  - Encourage patient to express fears, feelings, frustrations, and discuss symptoms    - [place] patient to reality, help patient recognize reality-based thinking   - Administer medications as ordered and assess for potential side effects  - Provide the patient education related to the signs and symptoms of the illness and desired effects of prescribed medications  Outcome: Progressing  Goal: Refrain from acting on delusional thinking/internal stimuli  Description: Interventions:  - Monitor patient closely, per order   - Utilize least restrictive measures   - Set reasonable limits, give positive feedback for acceptable   - Administer medications as ordered and monitor of potential side effects  Outcome: Progressing  Goal: Agree to be compliant with medication regime, as prescribed and report medication side effects  Description: Interventions:  - Offer appropriate PRN medication and supervise ingestion; conduct AIMS, as needed   Outcome: Progressing  Goal: Attend and participate in unit activities, including therapeutic, recreational, and educational groups  Description: Interventions:  -Encourage Visitation and family involvement in care  Outcome: Progressing  Goal: Complete daily ADLs, including personal hygiene independently, as able  Description: Interventions:  - Observe, teach, and assist patient with ADLS  - Monitor and promote a balance of rest/activity, with adequate nutrition and elimination   Outcome: Progressing     Problem: Ineffective Coping  Goal: Identifies ineffective coping skills  Outcome: Progressing  Goal: Identifies healthy coping  skills  Outcome: Progressing  Goal: Demonstrates healthy coping skills  Outcome: Progressing  Goal: Participates in unit activities  Description: Interventions:  - Provide therapeutic environment   - Provide required programming   - Redirect inappropriate behaviors   Outcome: Progressing  Goal: Patient/Family participate in treatment and DC plans  Description: Interventions:  - Provide therapeutic environment  Outcome: Progressing  Goal: Patient/Family verbalizes awareness of resources  Outcome: Progressing     Problem: Depression  Goal: Treatment Goal: Demonstrate behavioral control of depressive symptoms, verbalize feelings of improved mood/affect, and adopt new coping skills prior to discharge  Outcome: Progressing  Goal: Verbalize thoughts and feelings  Description: Interventions:  - Assess and re-assess patient's level of risk   - Engage patient in 1:1 interactions, daily, for a minimum of 15 minutes   - Encourage patient to express feelings, fears, frustrations, hopes   Outcome: Progressing  Goal: Refrain from harming self  Description: Interventions:  - Monitor patient closely, per order   - Supervise medication ingestion, monitor effects and side effects   Outcome: Progressing  Goal: Refrain from isolation  Description: Interventions:  - Develop a trusting relationship   - Encourage socialization   Outcome: Progressing  Goal: Refrain from self-neglect  Outcome: Progressing  Goal: Attend and participate in unit activities, including therapeutic, recreational, and educational groups  Description: Interventions:  - Provide therapeutic and educational activities daily, encourage attendance and participation, and document same in the medical record   Outcome: Progressing  Goal: Complete daily ADLs, including personal hygiene independently, as able  Description: Interventions:  - Observe, teach, and assist patient with ADLS  -  Monitor and promote a balance of rest/activity, with adequate nutrition and elimination    Outcome: Progressing     Problem: Anxiety  Goal: Anxiety is at manageable level  Description: Interventions:  - Assess and monitor patient's anxiety level.   - Monitor for signs and symptoms (heart palpitations, chest pain, shortness of breath, headaches, nausea, feeling jumpy, restlessness, irritable, apprehensive).   - Collaborate with interdisciplinary team and initiate plan and interventions as ordered.  - [place] patient to unit/surroundings  - Explain treatment plan  - Encourage participation in care  - Encourage verbalization of concerns/fears  - Identify coping mechanisms  - Assist in developing anxiety-reducing skills  - Administer/offer alternative therapies  - Limit or eliminate stimulants  Outcome: Progressing     Problem: Alteration in Orientation  Goal: Treatment Goal: Demonstrate a reduction of confusion and improved orientation to person, place, time and/or situation upon discharge, according to optimum baseline/ability  Outcome: Progressing  Goal: Interact with staff daily  Description: Interventions:  - Assess and re-assess patient's level of orientation  - Engage patient in 1 on 1 interactions, daily, for a minimum of 15 minutes   - Establish rapport/trust with patient   Outcome: Progressing     Problem: Alteration in Thoughts and Perception  Goal: Treatment Goal: Gain control of psychotic behaviors/thinking, reduce/eliminate presenting symptoms and demonstrate improved reality functioning upon discharge  Outcome: Not Progressing

## 2024-04-10 NOTE — PLAN OF CARE
Problem: Ineffective Coping  Goal: Identifies ineffective coping skills  Outcome: Not Progressing  Goal: Identifies healthy coping skills  Outcome: Not Progressing  Goal: Participates in unit activities  Description: Interventions:  - Provide therapeutic environment   - Provide required programming   - Redirect inappropriate behaviors   Outcome: Progressing   He has been attending more groups

## 2024-04-10 NOTE — SOCIAL WORK
BRANDY returned phone call to pt's aunt Hanh.   She reports that she is a [facility] provider and is inquiring about the possibility of pt being a placement with her so that he would have additional supports in the home. Aunt provided number for Crystal at [facility] - [phone]    BRANDY called to inquire re: general eligibility and requirements for program. Individuals must have IDD diagnosis and have waiver for funding to quality.

## 2024-04-10 NOTE — PROGRESS NOTES
04/10/24 2142   Team Meeting   Meeting Type Daily Rounds   Team Members Present   Team Members Present Physician;Nurse;;Other (Discipline and Name)   Patient/Family Present   Patient Present No   Patient's Family Present No     In attendance:  Dr. Alex Thomas, MD Dr. Jordan Holter, DO Eveline Hunt, HOLLI Lucas, RN  Luisana Alvarado, [facility]  Carla Lux, W  MATILDA Daniel.S.    Groups: 5/9    Pt had virtual visit with his aunt that went really well. Pt has been more visible and engaged on the unit. Pt reports continued AH threatening him and his family. No bx issues noted.

## 2024-04-10 NOTE — SOCIAL WORK
Email from Breanna Farley with Billy Silva's contact info - Billy is pt's CMP-MHDS  - [email or web address]   BRANDY reached out to Billy to schedule virtual meeting with Alberto.

## 2024-04-10 NOTE — NURSING NOTE
"Pt is accepting of medications without incidence and meal compliant. Pt is visible for meals and to make needs known, otherwise isolative to room. Pt is polite, pleasant and soft spoken. Pt reports continuing AH of \" bad voices\", but denies all other s/s. No new concerns at this time.   "

## 2024-04-10 NOTE — NURSING NOTE
Pt was more visible on the unit today and social with select peers. Consumed 100% of dinner. Took medications without incidence. Utilized nicotine gum. Pt is pleasant and cooperative. Attended most evening groups. Reports auditory hallucinations that threaten to kill him and his family. No behavioral issues. Pt offers no concerns or complaints. VSS. Continuous safety checks maintained.

## 2024-04-11 PROCEDURE — 99232 SBSQ HOSP IP/OBS MODERATE 35: CPT | Performed by: PSYCHIATRY & NEUROLOGY

## 2024-04-11 RX ORDER — RISPERIDONE 0.25 MG/1
1 TABLET ORAL
Status: DISCONTINUED | OUTPATIENT
Start: 2024-04-11 | End: 2024-04-16

## 2024-04-11 RX ORDER — SODIUM CHLORIDE, SODIUM LACTATE, POTASSIUM CHLORIDE, CALCIUM CHLORIDE 600; 310; 30; 20 MG/100ML; MG/100ML; MG/100ML; MG/100ML
50 INJECTION, SOLUTION INTRAVENOUS CONTINUOUS
Status: CANCELLED | OUTPATIENT
Start: 2024-04-11

## 2024-04-11 RX ADMIN — AMLODIPINE BESYLATE 5 MG: 5 TABLET ORAL at 08:45

## 2024-04-11 RX ADMIN — PROPRANOLOL HYDROCHLORIDE 10 MG: 10 TABLET ORAL at 08:44

## 2024-04-11 RX ADMIN — CLOZAPINE 450 MG: 25 TABLET ORAL at 21:09

## 2024-04-11 RX ADMIN — PROPRANOLOL HYDROCHLORIDE 10 MG: 10 TABLET ORAL at 21:09

## 2024-04-11 RX ADMIN — MINOCYCLINE HYDROCHLORIDE 100 MG: 100 CAPSULE ORAL at 08:44

## 2024-04-11 RX ADMIN — NICOTINE POLACRILEX 2 MG: 2 GUM, CHEWING BUCCAL at 08:45

## 2024-04-11 RX ADMIN — NICOTINE POLACRILEX 2 MG: 2 GUM, CHEWING BUCCAL at 15:22

## 2024-04-11 RX ADMIN — CYANOCOBALAMIN TAB 1000 MCG 1000 MCG: 1000 TAB at 08:45

## 2024-04-11 RX ADMIN — SENNOSIDES AND DOCUSATE SODIUM 2 TABLET: 8.6; 5 TABLET ORAL at 17:20

## 2024-04-11 RX ADMIN — MELATONIN TAB 3 MG 3 MG: 3 TAB at 21:10

## 2024-04-11 RX ADMIN — NICOTINE POLACRILEX 2 MG: 2 GUM, CHEWING BUCCAL at 17:22

## 2024-04-11 RX ADMIN — MINOCYCLINE HYDROCHLORIDE 100 MG: 100 CAPSULE ORAL at 21:09

## 2024-04-11 RX ADMIN — METFORMIN HYDROCHLORIDE 500 MG: 500 TABLET ORAL at 08:45

## 2024-04-11 RX ADMIN — ESCITALOPRAM OXALATE 20 MG: 10 TABLET, FILM COATED ORAL at 08:44

## 2024-04-11 RX ADMIN — NICOTINE POLACRILEX 2 MG: 2 GUM, CHEWING BUCCAL at 19:48

## 2024-04-11 RX ADMIN — POLYETHYLENE GLYCOL 3350 17 G: 17 POWDER, FOR SOLUTION ORAL at 08:45

## 2024-04-11 RX ADMIN — ATROPINE SULFATE 1 DROP: 10 SOLUTION/ DROPS OPHTHALMIC at 21:12

## 2024-04-11 RX ADMIN — SENNOSIDES AND DOCUSATE SODIUM 2 TABLET: 8.6; 5 TABLET ORAL at 08:45

## 2024-04-11 RX ADMIN — RISPERIDONE 1 MG: 0.25 TABLET, FILM COATED ORAL at 21:10

## 2024-04-11 NOTE — NURSING NOTE
Received pt in bed at change of shift with eyes closed; chest movement noted.  Continues the same thus this far as per q 7 min room checks.   Will continue to monitor behavior, sleeping pattern and any medical issues that may arise.    0517:  New order received by Dr. Rosario for Risperdal 1 mg po at HS starting tonight at 2200.

## 2024-04-11 NOTE — PROGRESS NOTES
04/11/24 0732   Team Meeting   Meeting Type Daily Rounds   Team Members Present   Team Members Present Physician;Nurse;;Other (Discipline and Name)   Patient/Family Present   Patient Present No   Patient's Family Present No     In attendance:  Dr. Alex Thomas, MD Dr. Jordan Holter, DO Eveline Hunt, HOLLI Lucas, RN  Luisana Alvarado, [facility]  Carla Lux, [facility] [place]  MATILDA Daniel.S.    Groups: 4/9    Pt reporting ongoing auditory hallucinations. Pt is pleasant overall and more engaged with groups and peers. Risperdal 1mg added starting tonight. ECT scheduled for tomorrow, 4/12.

## 2024-04-11 NOTE — NURSING NOTE
Alberto has been awake, alert, and visible intermittently out in the milieu.  Pt ate 100% supper.  Pt social with select peers.  Pleasant on approach, polite, and cooperative.  Affect constricted.  Pt stated that he still has  depression, anxiety,  auditory hallucinations, but has not verbalized any delusions.  Pt spends time resting in his room at intervals.  Pt attended and participated in evening wrap up group, and had snack after with peers.  Pt requested prn Nicotine gum and 2 mg po given at 1722, 1948. Pt reminded to shower tonight as he has ECT in AM and stated that he did.  Pt also informed that he will be NPO after midnight tonight and verbalized understanding.  Pt cooperative with taking scheduled medications as ordered and without incident.  Pt maintained on continuous safety checks.  Will continue to monitor/assess for any changes in behavior.

## 2024-04-11 NOTE — PLAN OF CARE
Problem: Alteration in Thoughts and Perception  Goal: Verbalize thoughts and feelings  Description: Interventions:  - Promote a nonjudgmental and trusting relationship with the patient through active listening and therapeutic communication  - Assess patient's level of functioning, behavior and potential for risk  - Engage patient in 1 on 1 interactions  - Encourage patient to express fears, feelings, frustrations, and discuss symptoms    - [place] patient to reality, help patient recognize reality-based thinking   - Administer medications as ordered and assess for potential side effects  - Provide the patient education related to the signs and symptoms of the illness and desired effects of prescribed medications  Outcome: Progressing  Goal: Refrain from acting on delusional thinking/internal stimuli  Description: Interventions:  - Monitor patient closely, per order   - Utilize least restrictive measures   - Set reasonable limits, give positive feedback for acceptable   - Administer medications as ordered and monitor of potential side effects  Outcome: Progressing  Goal: Agree to be compliant with medication regime, as prescribed and report medication side effects  Description: Interventions:  - Offer appropriate PRN medication and supervise ingestion; conduct AIMS, as needed   Outcome: Progressing  Goal: Attend and participate in unit activities, including therapeutic, recreational, and educational groups  Description: Interventions:  -Encourage Visitation and family involvement in care  Outcome: Progressing  Goal: Complete daily ADLs, including personal hygiene independently, as able  Description: Interventions:  - Observe, teach, and assist patient with ADLS  - Monitor and promote a balance of rest/activity, with adequate nutrition and elimination   Outcome: Progressing     Problem: Ineffective Coping  Goal: Identifies ineffective coping skills  Outcome: Progressing  Goal: Identifies healthy coping  skills  Outcome: Progressing  Goal: Demonstrates healthy coping skills  Outcome: Progressing  Goal: Participates in unit activities  Description: Interventions:  - Provide therapeutic environment   - Provide required programming   - Redirect inappropriate behaviors   Outcome: Progressing     Problem: Depression  Goal: Treatment Goal: Demonstrate behavioral control of depressive symptoms, verbalize feelings of improved mood/affect, and adopt new coping skills prior to discharge  Outcome: Progressing  Goal: Verbalize thoughts and feelings  Description: Interventions:  - Assess and re-assess patient's level of risk   - Engage patient in 1:1 interactions, daily, for a minimum of 15 minutes   - Encourage patient to express feelings, fears, frustrations, hopes   Outcome: Progressing  Goal: Refrain from harming self  Description: Interventions:  - Monitor patient closely, per order   - Supervise medication ingestion, monitor effects and side effects   Outcome: Progressing  Goal: Refrain from isolation  Description: Interventions:  - Develop a trusting relationship   - Encourage socialization   Outcome: Progressing  Goal: Complete daily ADLs, including personal hygiene independently, as able  Description: Interventions:  - Observe, teach, and assist patient with ADLS  -  Monitor and promote a balance of rest/activity, with adequate nutrition and elimination   Outcome: Progressing     Problem: Anxiety  Goal: Anxiety is at manageable level  Description: Interventions:  - Assess and monitor patient's anxiety level.   - Monitor for signs and symptoms (heart palpitations, chest pain, shortness of breath, headaches, nausea, feeling jumpy, restlessness, irritable, apprehensive).   - Collaborate with interdisciplinary team and initiate plan and interventions as ordered.  - [place] patient to unit/surroundings  - Explain treatment plan  - Encourage participation in care  - Encourage verbalization of concerns/fears  - Identify coping  mechanisms  - Assist in developing anxiety-reducing skills  - Administer/offer alternative therapies  - Limit or eliminate stimulants  Outcome: Progressing     Problem: Alteration in Thoughts and Perception  Goal: Treatment Goal: Gain control of psychotic behaviors/thinking, reduce/eliminate presenting symptoms and demonstrate improved reality functioning upon discharge  Outcome: Not Progressing

## 2024-04-11 NOTE — SOCIAL WORK
BRANDY received VM from aunt stating SW does not have all the information for Alberto to qualify for [facility] program.  SW returned call and left vm requesting a call back.

## 2024-04-11 NOTE — NURSING NOTE
"Pt noted to be eating breakfast hurriedly as he came out to eat late and proceeding to take medications while chugging cranberry juice. Encouraged pt to slow down at which time he did and began heaving. Small amount of pink vomit produced in dining room sink, no pills or food noted. Reported feeling \"a little bit better\". Returned to room and is laying in bed. Declined a prn for nausea.  "

## 2024-04-11 NOTE — PROGRESS NOTES
Psychiatry Progress Note [facility]    Alberto Berumen 28 y.o. male MRN: [id number]  Unit/Bed#: [facility] 107-01 Encounter: [id number]  Code Status: Level 1 - Full Code    PCP: Joce Juan MD    Date of Admission:  3/29/2024 2008   Date of Service:  04/11/24    Patient Active Problem List   Diagnosis    GERD (gastroesophageal reflux disease)    Medical clearance for psychiatric admission    Schizoaffective disorder, bipolar type (HCC)    Tobacco abuse    T wave inversion in EKG    Syringoma    Chronic idiopathic constipation    Vitamin B 12 deficiency    Vitamin D deficiency    Confluent and reticulate papillomatosis    Class 2 obesity in adult    Primary hypertension    Elevated hemoglobin A1c     Review of systems: Unremarkable  diagnosis: Schizoaffective bipolar    Assessment  Overall Status: Still hearing threatening voices telling him that he is going to be killed along with his family and he believes ECT has helped.  Voices are not commanding.  certification Statement: The patient will continue to require additional inpatient hospital stay due to ongoing voices that are threatening to kill him and his family lack of response to medications and ECT so far.     Medications: Clozapine 450 mg at bedtime, propranolol 10 mg every 12 hours as as needed for anxiety, Lexapro 20 mg once a day, atropine eyedrops sublingual for drooling of saliva and senna 2 tablets twice a day for constipation  Medications reviewed and recommend they be continued for symptom management side effects from treatment: None reported  Medication changes    Risperdal will be added as 1 mg as an adjunct with the clozapine on board for treatment resistant schizophrenia   Medication education   Risks side effects benefits and precautions of medications discussed with patient and he did verbalize an understanding about risks for metabolic syndrome from being on neuroleptics and is form tardive dyskinesia etc. and special  precautions about being on clozapine   Understanding of medications: Has some understanding   Justification for dual anti-psychotics: Not applicable    Non-pharmacological treatments  Continue with individual, group, milieu and occupational therapy using recovery principles and psycho-education about accepting illness and the need for treatment.   to contact aunt and explore ACT team referral which he never had  ECT to be continued 2 more times on Wed & Friday for a total of 15 followed by weekly maintenance ECTs    Safety  Safety and communication plan established to target dynamic risk factors discussed above.    Discharge Plan   Back to his aunt with an ACT team    Interval Progress   Still found laying on bed most of the time appearing preoccupied internally attuned and maintaining hearing voices all the time that are threatening to kill him and his family and he says he has been hearing well for years and he believes ECTs has definitely cut them down.  He continues to report no command voices insisting he wants to live.  He is to get maintenance weekly ECTs as of tomorrow and is looking forward to that and even asking if he could get more but I reminded him that they can only give except a number of ECTs at a time and followed by maintenance ECTs.  No episodes of aggression or agitation or threatening or demanding or self-abusive behaviors and no need for behavioral PRNs reported and he is willing to add risperidone as an adjunct treatment for treatment resistant schizophrenia he has on top of the clozapine   Acceptance by patient: Accepting  Hopefulness in recovery: With his aunt again  Involved in reintegration process: Talking with his sister and aunt  Trusting in relationship with psychiatrist: Trusting   Sleep: Good  Appetite: Good  Compliance with Medications: Compliant  Group attendance: Half of the groups 4/9  Significant events: Status quo with no significant changes    Mental Status  Exam  Appearance: age appropriate, improved grooming, looks older than stated age, overweight, with hair and dreadlocks casually dressed with a clean shaven face, fairly groomed with good eye contact found wide awake and found resting on his bed in his room when approached with good eye contact  behavior: cooperative, mildly anxious, evasive, gesturing, slow responses preoccupied internally attuned  Speech: normal rate, normal volume, normal pitch  Mood: dysphoric, anxious, reports he feels good  Affect: constricted, inappropriate, mood-congruent  Thought Process: organized, logical, coherent, goal directed, linear, decreased rate of thoughts, slowing of thoughts, negative thinking, impaired abstract reasoning, concrete  Thought Content: paranoid ideation, some paranoia, grandiose ideas, intrusive thoughts, preoccupied, chronic,   Denies any current suicidal or homicidal thoughts and plans no overt delusions elicited but appears paranoid as usual.  Clean shaven with no phobias obsessions compulsions or distorted body perceptions  Perceptual Disturbances: auditory hallucinations, appears preoccupied, appears responding to internal stimuli, not observed, reports voices tell him that he and his family are going to be killed sometimes from 1 person on more than one person talking to him claiming he has not seen much difference other than a little bit subdued with the ECT.  No current suicidal or homicidal thoughts and no plans verbalized.  No phobias obsessions compulsions or distorted body perceptions reported.  Hx Risk Factors: chronic psychiatric problems, chronic anxiety symptoms, chronic psychotic symptoms, his aunt possibly moving away  Sensorium: Alert and oriented x 3 spheres and situation   cognition: recent and remote memory grossly intact  Consciousness: alert and awake  Attention: attention span and concentration are age appropriate  Intellect: appears to be of average intelligence  Insight:  intact  Judgement: intact  Motor Activity: no abnormal movements     Vitals  Temp:  [97.8 °F (36.6 °C)] 97.8 °F (36.6 °C)  HR:  [94-99] 99  Resp:  [18] 18  BP: (136-151)/() 136/90  SpO2:  [99 %] 99 %  No intake or output data in the 24 hours ending 04/11/24 0503        Lab Results: All Labs For Current Hospital Admission Reviewed ANC on 4/2/24 was 5650, rest of labs OK but Vit D level is  low      Current Facility-Administered Medications   Medication Dose Route Frequency Provider Last Rate    acetaminophen  650 mg Oral Q6H PRN Jordan C Holter, DO      acetaminophen  650 mg Oral Q4H PRN Jordan C Holter, DO      acetaminophen  975 mg Oral Q6H PRN Jordan C Holter, DO      aluminum-magnesium hydroxide-simethicone  30 mL Oral Q4H PRN Jordan C Holter, DO      amLODIPine  5 mg Oral Daily HLOLI Lion      Artificial Tears  1 drop Both Eyes Q3H PRN Jordan C Holter, DO      atropine  1 drop Sublingual HS HOLLI Lion      atropine  1 drop Sublingual Daily PRN HOLLI Lion      benztropine  1 mg Intramuscular Q4H PRN Max 6/day Jordan C Holter, DO      benztropine  1 mg Oral Q4H PRN Max 6/day STEVE LionNP      benztropine  1 mg Oral Q4H PRN Max 6/day Jordan C Holter, DO      cloZAPine  450 mg Oral HS HOLLI Lion      cyanocobalamin  1,000 mcg Oral Daily HOLLI Galvan      hydrOXYzine HCL  50 mg Oral Q6H PRN Max 4/day Jordan C Holter, DO      Or    diphenhydrAMINE  50 mg Intramuscular Q6H PRN Jordan C Holter, DO      diphenhydrAMINE-zinc acetate   Topical BID PRN HOLLI Lion      ergocalciferol  50,000 Units Oral Weekly HOLLI Galvan      escitalopram  20 mg Oral Daily STEVE LionNP      hydrocortisone   Topical 4x Daily PRN HOLLI Lion      hydrOXYzine HCL  100 mg Oral Q6H PRN Max 4/day Jordan C Holter, DO      Or    LORazepam  2 mg Intramuscular Q6H PRN Jordan C Holter, DO      hydrOXYzine HCL  25 mg Oral Q6H PRN Max 4/day Jordan C Holter, DO       ibuprofen  600 mg Oral Q8H PRN HOLLI Lion      melatonin  3 mg Oral HS [facility] Holter, DO      metFORMIN  500 mg Oral Daily With Breakfast HOLLI Lion      methocarbamol  500 mg Oral Q6H PRN HOLLI Lion      minocycline  100 mg Oral Q12H [facility] HOLLI Lion      nicotine polacrilex  2 mg Oral Q2H PRN [facility] Holter, DO      OLANZapine  5 mg Oral Q4H PRN Max 3/day [facility] Holter, DO      Or    OLANZapine  2.5 mg Intramuscular Q4H PRN Max 3/day [facility] Holter, DO      OLANZapine  5 mg Oral Q3H PRN Max 3/day [facility] Holter, DO      Or    OLANZapine  5 mg Intramuscular Q3H PRN Max 3/day [facility] Holter, DO      OLANZapine  2.5 mg Oral Q4H PRN Max 6/day [facility] Holter, DO      ondansetron  4 mg Oral Q6H PRN HOLLI Lion      polyethylene glycol  17 g Oral Daily HOLLI Lion      polyethylene glycol  17 g Oral Daily PRN [facility] Holter, DO      propranolol  10 mg Oral Q12H [facility] HOLLI Lion      senna-docusate sodium  1 tablet Oral Daily PRN [facility] Holter, DO      senna-docusate sodium  2 tablet Oral BID HOLLI Lion      white petrolatum-mineral oil   Topical TID PRN HOLLI Lion         Counseling / Coordination of Care: Total floor / unit time spent today 15 minutes. Greater than 50% of total time was spent with the patient and / or family counseling and / or somewhat receptive to supportive listening and teaching positive coping skills to deal with symptom mangement.     Patient's Rights, confidentiality and exceptions to confidentiality, use of automated medical record, Behavioral Health Services staff access to medical record, and consent to treatment reviewed.    This note has been dictated and hence there may be problems with punctuation, spelling and formatting and if anyone has any concerns please address them to Dr. Rosario   This note is not shared with patient due to potential for making patient's condition worse by knowing the content of the  note.

## 2024-04-12 ENCOUNTER — APPOINTMENT (INPATIENT)
Dept: PREOP/PACU | Facility: HOSPITAL | Age: 29
DRG: 750 | End: 2024-04-12
Attending: PSYCHIATRY & NEUROLOGY
Payer: COMMERCIAL

## 2024-04-12 ENCOUNTER — ANESTHESIA EVENT (INPATIENT)
Dept: PREOP/PACU | Facility: HOSPITAL | Age: 29
End: 2024-04-12
Payer: COMMERCIAL

## 2024-04-12 ENCOUNTER — ANESTHESIA (INPATIENT)
Dept: PREOP/PACU | Facility: HOSPITAL | Age: 29
End: 2024-04-12
Payer: COMMERCIAL

## 2024-04-12 PROCEDURE — 99232 SBSQ HOSP IP/OBS MODERATE 35: CPT | Performed by: PSYCHIATRY & NEUROLOGY

## 2024-04-12 PROCEDURE — GZB2ZZZ ELECTROCONVULSIVE THERAPY, BILATERAL-SINGLE SEIZURE: ICD-10-PCS | Performed by: STUDENT IN AN ORGANIZED HEALTH CARE EDUCATION/TRAINING PROGRAM

## 2024-04-12 PROCEDURE — 90870 ELECTROCONVULSIVE THERAPY: CPT

## 2024-04-12 PROCEDURE — 90870 ELECTROCONVULSIVE THERAPY: CPT | Performed by: STUDENT IN AN ORGANIZED HEALTH CARE EDUCATION/TRAINING PROGRAM

## 2024-04-12 RX ORDER — METHOHEXITAL IN WATER/PF 100MG/10ML
SYRINGE (ML) INTRAVENOUS AS NEEDED
Status: DISCONTINUED | OUTPATIENT
Start: 2024-04-12 | End: 2024-04-12

## 2024-04-12 RX ORDER — AMOXICILLIN 250 MG
1 CAPSULE ORAL DAILY PRN
Status: DISCONTINUED | OUTPATIENT
Start: 2024-04-12 | End: 2024-04-12 | Stop reason: SDUPTHER

## 2024-04-12 RX ORDER — HALOPERIDOL 5 MG/ML
2.5 INJECTION INTRAMUSCULAR
Status: DISCONTINUED | OUTPATIENT
Start: 2024-04-12 | End: 2025-03-14 | Stop reason: HOSPADM

## 2024-04-12 RX ORDER — DIPHENHYDRAMINE HYDROCHLORIDE 50 MG/ML
50 INJECTION, SOLUTION INTRAMUSCULAR; INTRAVENOUS EVERY 6 HOURS PRN
Status: DISCONTINUED | OUTPATIENT
Start: 2024-04-12 | End: 2025-03-14 | Stop reason: HOSPADM

## 2024-04-12 RX ORDER — HYDRALAZINE HYDROCHLORIDE 20 MG/ML
INJECTION INTRAMUSCULAR; INTRAVENOUS AS NEEDED
Status: DISCONTINUED | OUTPATIENT
Start: 2024-04-12 | End: 2024-04-12

## 2024-04-12 RX ORDER — HYDROXYZINE HYDROCHLORIDE 25 MG/1
25 TABLET, FILM COATED ORAL
Status: DISCONTINUED | OUTPATIENT
Start: 2024-04-12 | End: 2024-04-12 | Stop reason: SDUPTHER

## 2024-04-12 RX ORDER — BISACODYL 10 MG
10 SUPPOSITORY, RECTAL RECTAL DAILY PRN
Status: DISCONTINUED | OUTPATIENT
Start: 2024-04-12 | End: 2025-03-14 | Stop reason: HOSPADM

## 2024-04-12 RX ORDER — ACETAMINOPHEN 325 MG/1
650 TABLET ORAL EVERY 4 HOURS PRN
Status: DISCONTINUED | OUTPATIENT
Start: 2024-04-12 | End: 2024-04-12 | Stop reason: SDUPTHER

## 2024-04-12 RX ORDER — HYDROXYZINE HYDROCHLORIDE 50 MG/1
50 TABLET, FILM COATED ORAL
Status: DISCONTINUED | OUTPATIENT
Start: 2024-04-12 | End: 2025-03-14 | Stop reason: HOSPADM

## 2024-04-12 RX ORDER — MAGNESIUM HYDROXIDE/ALUMINUM HYDROXICE/SIMETHICONE 120; 1200; 1200 MG/30ML; MG/30ML; MG/30ML
30 SUSPENSION ORAL EVERY 4 HOURS PRN
Status: DISCONTINUED | OUTPATIENT
Start: 2024-04-12 | End: 2024-04-12 | Stop reason: SDUPTHER

## 2024-04-12 RX ORDER — GLYCOPYRROLATE 0.2 MG/ML
INJECTION INTRAMUSCULAR; INTRAVENOUS AS NEEDED
Status: DISCONTINUED | OUTPATIENT
Start: 2024-04-12 | End: 2024-04-12

## 2024-04-12 RX ORDER — BENZTROPINE MESYLATE 1 MG/ML
1 INJECTION, SOLUTION INTRAMUSCULAR; INTRAVENOUS
Status: DISCONTINUED | OUTPATIENT
Start: 2024-04-12 | End: 2025-03-14 | Stop reason: HOSPADM

## 2024-04-12 RX ORDER — HALOPERIDOL 1 MG/1
1 TABLET ORAL EVERY 6 HOURS PRN
Status: DISCONTINUED | OUTPATIENT
Start: 2024-04-12 | End: 2025-03-14 | Stop reason: HOSPADM

## 2024-04-12 RX ORDER — LORAZEPAM 2 MG/ML
1 INJECTION INTRAMUSCULAR
Status: DISCONTINUED | OUTPATIENT
Start: 2024-04-12 | End: 2025-03-14 | Stop reason: HOSPADM

## 2024-04-12 RX ORDER — SODIUM CHLORIDE, SODIUM LACTATE, POTASSIUM CHLORIDE, CALCIUM CHLORIDE 600; 310; 30; 20 MG/100ML; MG/100ML; MG/100ML; MG/100ML
INJECTION, SOLUTION INTRAVENOUS CONTINUOUS PRN
Status: DISCONTINUED | OUTPATIENT
Start: 2024-04-12 | End: 2024-04-12

## 2024-04-12 RX ORDER — ACETAMINOPHEN 325 MG/1
650 TABLET ORAL EVERY 6 HOURS PRN
Status: DISCONTINUED | OUTPATIENT
Start: 2024-04-12 | End: 2025-03-14 | Stop reason: HOSPADM

## 2024-04-12 RX ORDER — LABETALOL HYDROCHLORIDE 5 MG/ML
INJECTION, SOLUTION INTRAVENOUS AS NEEDED
Status: DISCONTINUED | OUTPATIENT
Start: 2024-04-12 | End: 2024-04-12

## 2024-04-12 RX ORDER — BENZTROPINE MESYLATE 1 MG/ML
0.5 INJECTION, SOLUTION INTRAMUSCULAR; INTRAVENOUS
Status: DISCONTINUED | OUTPATIENT
Start: 2024-04-12 | End: 2025-03-14 | Stop reason: HOSPADM

## 2024-04-12 RX ORDER — LIDOCAINE HYDROCHLORIDE 10 MG/ML
0.5 INJECTION, SOLUTION EPIDURAL; INFILTRATION; INTRACAUDAL; PERINEURAL ONCE AS NEEDED
Status: DISCONTINUED | OUTPATIENT
Start: 2024-04-12 | End: 2024-04-12

## 2024-04-12 RX ORDER — ESMOLOL HYDROCHLORIDE 10 MG/ML
INJECTION INTRAVENOUS AS NEEDED
Status: DISCONTINUED | OUTPATIENT
Start: 2024-04-12 | End: 2024-04-12

## 2024-04-12 RX ORDER — SODIUM CHLORIDE, SODIUM LACTATE, POTASSIUM CHLORIDE, CALCIUM CHLORIDE 600; 310; 30; 20 MG/100ML; MG/100ML; MG/100ML; MG/100ML
50 INJECTION, SOLUTION INTRAVENOUS CONTINUOUS
Status: DISCONTINUED | OUTPATIENT
Start: 2024-04-12 | End: 2024-04-12

## 2024-04-12 RX ORDER — HALOPERIDOL 5 MG/1
5 TABLET ORAL
Status: DISCONTINUED | OUTPATIENT
Start: 2024-04-12 | End: 2025-03-14 | Stop reason: HOSPADM

## 2024-04-12 RX ORDER — ONDANSETRON 2 MG/ML
4 INJECTION INTRAMUSCULAR; INTRAVENOUS ONCE AS NEEDED
Status: DISCONTINUED | OUTPATIENT
Start: 2024-04-12 | End: 2024-04-12 | Stop reason: HOSPADM

## 2024-04-12 RX ORDER — LORAZEPAM 2 MG/ML
2 INJECTION INTRAMUSCULAR EVERY 6 HOURS PRN
Status: DISCONTINUED | OUTPATIENT
Start: 2024-04-12 | End: 2025-03-14 | Stop reason: HOSPADM

## 2024-04-12 RX ORDER — POLYETHYLENE GLYCOL 3350 17 G/17G
17 POWDER, FOR SOLUTION ORAL DAILY PRN
Status: DISCONTINUED | OUTPATIENT
Start: 2024-04-12 | End: 2024-04-12 | Stop reason: SDUPTHER

## 2024-04-12 RX ORDER — KETOROLAC TROMETHAMINE 30 MG/ML
INJECTION, SOLUTION INTRAMUSCULAR; INTRAVENOUS AS NEEDED
Status: DISCONTINUED | OUTPATIENT
Start: 2024-04-12 | End: 2024-04-12

## 2024-04-12 RX ORDER — TRAZODONE HYDROCHLORIDE 50 MG/1
50 TABLET ORAL
Status: DISCONTINUED | OUTPATIENT
Start: 2024-04-12 | End: 2025-03-14 | Stop reason: HOSPADM

## 2024-04-12 RX ORDER — ALBUTEROL SULFATE 0.83 MG/ML
2.5 SOLUTION RESPIRATORY (INHALATION) ONCE AS NEEDED
Status: DISCONTINUED | OUTPATIENT
Start: 2024-04-12 | End: 2024-04-12 | Stop reason: HOSPADM

## 2024-04-12 RX ORDER — LORAZEPAM 2 MG/ML
2 INJECTION INTRAMUSCULAR
Status: DISCONTINUED | OUTPATIENT
Start: 2024-04-12 | End: 2025-03-14 | Stop reason: HOSPADM

## 2024-04-12 RX ORDER — SODIUM CHLORIDE, SODIUM LACTATE, POTASSIUM CHLORIDE, CALCIUM CHLORIDE 600; 310; 30; 20 MG/100ML; MG/100ML; MG/100ML; MG/100ML
100 INJECTION, SOLUTION INTRAVENOUS CONTINUOUS
Status: DISCONTINUED | OUTPATIENT
Start: 2024-04-12 | End: 2024-04-12

## 2024-04-12 RX ORDER — HYDROXYZINE HYDROCHLORIDE 50 MG/1
100 TABLET, FILM COATED ORAL
Status: DISCONTINUED | OUTPATIENT
Start: 2024-04-12 | End: 2025-03-14 | Stop reason: HOSPADM

## 2024-04-12 RX ORDER — HALOPERIDOL 5 MG/ML
5 INJECTION INTRAMUSCULAR
Status: DISCONTINUED | OUTPATIENT
Start: 2024-04-12 | End: 2025-03-14 | Stop reason: HOSPADM

## 2024-04-12 RX ORDER — ACETAMINOPHEN 325 MG/1
975 TABLET ORAL EVERY 6 HOURS PRN
Status: DISCONTINUED | OUTPATIENT
Start: 2024-04-12 | End: 2024-04-12 | Stop reason: SDUPTHER

## 2024-04-12 RX ORDER — SUCCINYLCHOLINE/SOD CL,ISO/PF 100 MG/5ML
SYRINGE (ML) INTRAVENOUS AS NEEDED
Status: DISCONTINUED | OUTPATIENT
Start: 2024-04-12 | End: 2024-04-12

## 2024-04-12 RX ADMIN — Medication 120 MG: at 07:07

## 2024-04-12 RX ADMIN — CLOZAPINE 450 MG: 25 TABLET ORAL at 21:15

## 2024-04-12 RX ADMIN — NICOTINE POLACRILEX 2 MG: 2 GUM, CHEWING BUCCAL at 17:48

## 2024-04-12 RX ADMIN — NICOTINE POLACRILEX 2 MG: 2 GUM, CHEWING BUCCAL at 15:38

## 2024-04-12 RX ADMIN — ESMOLOL HYDROCHLORIDE 50 MG: 10 INJECTION, SOLUTION INTRAVENOUS at 07:07

## 2024-04-12 RX ADMIN — NICOTINE POLACRILEX 2 MG: 2 GUM, CHEWING BUCCAL at 08:27

## 2024-04-12 RX ADMIN — CYANOCOBALAMIN TAB 1000 MCG 1000 MCG: 1000 TAB at 08:27

## 2024-04-12 RX ADMIN — MINOCYCLINE HYDROCHLORIDE 100 MG: 100 CAPSULE ORAL at 21:15

## 2024-04-12 RX ADMIN — NICOTINE POLACRILEX 2 MG: 2 GUM, CHEWING BUCCAL at 19:57

## 2024-04-12 RX ADMIN — HYDRALAZINE HYDROCHLORIDE 10 MG: 20 INJECTION INTRAMUSCULAR; INTRAVENOUS at 07:20

## 2024-04-12 RX ADMIN — RISPERIDONE 1 MG: 0.25 TABLET, FILM COATED ORAL at 21:15

## 2024-04-12 RX ADMIN — PROPRANOLOL HYDROCHLORIDE 10 MG: 10 TABLET ORAL at 21:15

## 2024-04-12 RX ADMIN — SENNOSIDES AND DOCUSATE SODIUM 2 TABLET: 8.6; 5 TABLET ORAL at 17:11

## 2024-04-12 RX ADMIN — NICOTINE POLACRILEX 2 MG: 2 GUM, CHEWING BUCCAL at 12:58

## 2024-04-12 RX ADMIN — SENNOSIDES AND DOCUSATE SODIUM 2 TABLET: 8.6; 5 TABLET ORAL at 08:27

## 2024-04-12 RX ADMIN — SODIUM CHLORIDE, SODIUM LACTATE, POTASSIUM CHLORIDE, AND CALCIUM CHLORIDE: .6; .31; .03; .02 INJECTION, SOLUTION INTRAVENOUS at 06:45

## 2024-04-12 RX ADMIN — MELATONIN TAB 3 MG 3 MG: 3 TAB at 21:15

## 2024-04-12 RX ADMIN — GLYCOPYRROLATE 0.2 MG: 0.2 INJECTION, SOLUTION INTRAMUSCULAR; INTRAVENOUS at 07:05

## 2024-04-12 RX ADMIN — LABETALOL HYDROCHLORIDE 20 MG: 5 INJECTION, SOLUTION INTRAVENOUS at 07:07

## 2024-04-12 RX ADMIN — PROPRANOLOL HYDROCHLORIDE 10 MG: 10 TABLET ORAL at 06:03

## 2024-04-12 RX ADMIN — AMLODIPINE BESYLATE 5 MG: 5 TABLET ORAL at 08:27

## 2024-04-12 RX ADMIN — ESCITALOPRAM OXALATE 20 MG: 10 TABLET, FILM COATED ORAL at 08:27

## 2024-04-12 RX ADMIN — SODIUM CHLORIDE, SODIUM LACTATE, POTASSIUM CHLORIDE, AND CALCIUM CHLORIDE 50 ML/HR: .6; .31; .03; .02 INJECTION, SOLUTION INTRAVENOUS at 06:23

## 2024-04-12 RX ADMIN — POLYETHYLENE GLYCOL 3350 17 G: 17 POWDER, FOR SOLUTION ORAL at 08:29

## 2024-04-12 RX ADMIN — METFORMIN HYDROCHLORIDE 500 MG: 500 TABLET ORAL at 08:27

## 2024-04-12 RX ADMIN — MINOCYCLINE HYDROCHLORIDE 100 MG: 100 CAPSULE ORAL at 08:27

## 2024-04-12 RX ADMIN — Medication 140 MG: at 07:07

## 2024-04-12 RX ADMIN — ATROPINE SULFATE 1 DROP: 10 SOLUTION/ DROPS OPHTHALMIC at 21:16

## 2024-04-12 RX ADMIN — KETOROLAC TROMETHAMINE 30 MG: 30 INJECTION, SOLUTION INTRAMUSCULAR; INTRAVENOUS at 07:05

## 2024-04-12 NOTE — PROCEDURES
"Procedure Note - ECT  Alberto Berumen 28 y.o. male MRN: [id number]    Time out was taken with staff to confirm correct patient and correct procedure to be performed. This was the patient's 15th session of ECT.  Prior to starting the procedure, the patient's questions and concerns were addressed.  Patient inquired as to how many more and how often he will be receiving ECT moving forward.  Attending physician stated that he would follow-up with patient's current provider about ECT scheduling moving forward.  Patient reported improvement in mood/ symptoms.  Specifically, patient reports that ECT \"helps him stay out of bed\".  Patient denies side effects since last session of ECT.  Patient denied SI, VH, and HI.  However patient reports auditory hallucinations.  Patient confirms that he feels safe in the hospital.  Today patient agreed to continuing treatment. Stimulus dose was 100% . Patient had a satisfactory seizure. No immediate side effects were noted after the procedure.     This procedure was performed in the presence of and under the direct supervision of Dr. Arroyo.    Recommendations for next session of ECT: Consider switching to etomidate.    Session Number: 15    Diagnosis: Principal Problem:    Schizoaffective disorder, bipolar type (HCC)  Active Problems:    GERD (gastroesophageal reflux disease)    Medical clearance for psychiatric admission    Tobacco abuse    T wave inversion in EKG    Chronic idiopathic constipation    Confluent and reticulate papillomatosis    Primary hypertension    Elevated hemoglobin A1c      ECT Type: Inpatient    Anesthesia: Methohexital    Electrode Placement: Bilateral    Energy level:  100 %      Seizure Duration     EE Sec.     EMG : 19 Sec (visual)    Post-ictal Suppression Index: 66.7 %    Results:Clinical seizure was satisfactory, Patient tolerated ECT well      Vitals:    24 0737   BP: 133/85   Pulse: 84   Resp:    Temp:    SpO2: 98%        Medication " Administration - last 24 hours from 04/11/2024 0745 to 04/12/2024 0745         Date/Time Order Dose Route Action Action by     04/12/2024 0606 EDT amLODIPine (NORVASC) tablet 5 mg 0 mg Oral Hold Arin Travis RN     04/11/2024 0845 EDT amLODIPine (NORVASC) tablet 5 mg 5 mg Oral Given Arin Travis RN     04/11/2024 2112 EDT atropine (ISOPTO ATROPINE) 1 % ophthalmic solution 1 drop 1 drop Sublingual Given Paolo Smalls RN     04/11/2024 2109 EDT cloZAPine (CLOZARIL) tablet 450 mg 450 mg Oral Given Paolo Smalls RN     04/11/2024 0844 EDT escitalopram (LEXAPRO) tablet 20 mg 20 mg Oral Given Arin Travis RN     04/11/2024 0845 EDT metFORMIN (GLUCOPHAGE) tablet 500 mg 500 mg Oral Given Arin Travis RN     04/11/2024 2109 EDT minocycline (MINOCIN) capsule 100 mg 100 mg Oral Given Paolo Smalls RN     04/11/2024 0844 EDT minocycline (MINOCIN) capsule 100 mg 100 mg Oral Given Arin Travis RN     04/11/2024 0845 EDT polyethylene glycol (MIRALAX) packet 17 g 17 g Oral Given Arin Travis RN     04/12/2024 0603 EDT propranolol (INDERAL) tablet 10 mg 10 mg Oral Given Arin Travis RN     04/11/2024 2109 EDT propranolol (INDERAL) tablet 10 mg 10 mg Oral Given Paolo Smalls RN     04/11/2024 0844 EDT propranolol (INDERAL) tablet 10 mg 10 mg Oral Given Arin Travis RN     04/11/2024 1720 EDT senna-docusate sodium (SENOKOT S) 8.6-50 mg per tablet 2 tablet 2 tablet Oral Given Paolo Smalls RN     04/11/2024 0845 EDT senna-docusate sodium (SENOKOT S) 8.6-50 mg per tablet 2 tablet 2 tablet Oral Given Arin Travis RN     04/11/2024 2110 EDT melatonin tablet 3 mg 3 mg Oral Given Paolo Smalls RN     04/11/2024 1948 EDT nicotine polacrilex (NICORETTE) gum 2 mg 2 mg Oral Given Paolo Smalls RN     04/11/2024 1722 EDT nicotine polacrilex (NICORETTE) gum 2 mg 2 mg Oral Given Paolo Smalls RN     04/11/2024 1522 EDT nicotine polacrilex (NICORETTE) gum 2 mg 2 mg Oral Given Mare Berry RN     04/11/2024 0845 EDT  nicotine polacrilex (NICORETTE) gum 2 mg 2 mg Oral Given Arin Travis RN     04/11/2024 0845 EDT cyanocobalamin (VITAMIN B-12) tablet 1,000 mcg 1,000 mcg Oral Given Arin Travis RN     04/11/2024 2110 EDT risperiDONE (RisperDAL) tablet 1 mg 1 mg Oral Given Paolo Smalls RN     04/12/2024 0738 EDT lactated ringers infusion 0 mL/hr Intravenous Stopped Jaiden Addison RN     04/12/2024 0623 EDT lactated ringers infusion 50 mL/hr Intravenous New Kaylyn Montenegro RN     04/12/2024 0739 EDT lactated ringers infusion 0 mL/hr Intravenous Stopped MIGUEL Feldman DO 04/12/24  Psychiatry Resident, PGY- I

## 2024-04-12 NOTE — NURSING NOTE
Pt is accepting of medications without incidence and meal compliant. Pt is visible in the milieu for meals and to request nicorette gum. Pt keeps to self and rests in bed. Reports AH, but denies all other s/s. No new concerns.

## 2024-04-12 NOTE — ANESTHESIA PREPROCEDURE EVALUATION
Procedure:  ELECTROCONVULSIVE THERAPY (ECT)    Relevant Problems   CARDIO   (+) Primary hypertension      GI/HEPATIC   (+) GERD (gastroesophageal reflux disease)        Physical Exam    Airway    Mallampati score: II  TM Distance: >3 FB  Neck ROM: full     Dental       Cardiovascular  Cardiovascular exam normal    Pulmonary  Pulmonary exam normal     Other Findings      Anesthesia Plan  ASA Score- 2     Anesthesia Type- general with ASA Monitors.         Additional Monitors:     Airway Plan:            Plan Factors-Exercise tolerance (METS): >4 METS.    Chart reviewed. EKG reviewed.  Existing labs reviewed. Patient summary reviewed.                  Induction- intravenous.    Postoperative Plan-     Informed Consent- Anesthetic plan and risks discussed with patient.  I personally reviewed this patient with the CRNA. Discussed and agreed on the Anesthesia Plan with the CRNA..            Lab Results   Component Value Date    HGBA1C 5.0 04/01/2024       Lab Results   Component Value Date    K 4.1 04/01/2024     04/01/2024    CO2 27 04/01/2024    BUN 12 04/01/2024    CREATININE 0.90 04/01/2024    GLUF 97 01/11/2024    CALCIUM 9.3 04/01/2024    AST 23 04/01/2024    ALT 53 (H) 04/01/2024    ALKPHOS 84 04/01/2024    EGFR 116 04/01/2024       Lab Results   Component Value Date    WBC 9.19 04/08/2024    HGB 12.7 04/08/2024    HCT 41.9 04/08/2024    MCV 78 (L) 04/08/2024     04/08/2024     Sinus tachycardia  Nonspecific T wave abnormality  Abnormal ECG  When compared with ECG of 11-MAR-2024 15:41, (unconfirmed)  Sinus rhythm has replaced Ectopic atrial rhythm  QRS axis Shifted left  Confirmed by Angel Lara ([id number]) on 3/11/2024 5:11:48 PM      Specimen Collected: 03/11/24 15:42

## 2024-04-12 NOTE — PLAN OF CARE
Problem: Depression  Goal: Refrain from harming self  Description: Interventions:  - Monitor patient closely, per order   - Supervise medication ingestion, monitor effects and side effects   Outcome: Progressing     Problem: Electroconvulsive therapy (ECT)  Goal: Absence of urinary retention  Description: INTERVENTIONS:  - Assess patient’s ability to void and empty bladder  - Monitor I/O  - Bladder scan as needed  - Discuss with physician/AP medications to alleviate retention as needed  - Discuss catheterization for long term situations as appropriate  Outcome: Progressing  Goal: Minimal or absence of nausea and/or vomiting  Description: INTERVENTIONS:  - Administer IV fluids if ordered to ensure adequate hydration  - Maintain NPO status until nausea and vomiting are resolved  - Nasogastric tube if ordered  - Administer ordered antiemetic medications as needed  - Provide nonpharmacologic comfort measures as appropriate  - Advance diet as tolerated, if ordered  - Consider nutrition services referral to assist patient with adequate nutrition and appropriate food choices  Outcome: Progressing     Problem: Alteration in Thoughts and Perception  Goal: Verbalize thoughts and feelings  Description: Interventions:  - Promote a nonjudgmental and trusting relationship with the patient through active listening and therapeutic communication  - Assess patient's level of functioning, behavior and potential for risk  - Engage patient in 1 on 1 interactions  - Encourage patient to express fears, feelings, frustrations, and discuss symptoms    - [place] patient to reality, help patient recognize reality-based thinking   - Administer medications as ordered and assess for potential side effects  - Provide the patient education related to the signs and symptoms of the illness and desired effects of prescribed medications  Outcome: Not Progressing  Goal: Refrain from acting on delusional thinking/internal stimuli  Description:  Interventions:  - Monitor patient closely, per order   - Utilize least restrictive measures   - Set reasonable limits, give positive feedback for acceptable   - Administer medications as ordered and monitor of potential side effects  Outcome: Not Progressing  Goal: Agree to be compliant with medication regime, as prescribed and report medication side effects  Description: Interventions:  - Offer appropriate PRN medication and supervise ingestion; conduct AIMS, as needed   Outcome: Not Progressing  Goal: Attend and participate in unit activities, including therapeutic, recreational, and educational groups  Description: Interventions:  -Encourage Visitation and family involvement in care  Outcome: Not Progressing  Goal: Recognize dysfunctional thoughts, communicate reality-based thoughts at the time of discharge  Description: Interventions:  - Provide medication and psycho-education to assist patient in compliance and developing insight into his/her illness   Outcome: Not Progressing  Goal: Complete daily ADLs, including personal hygiene independently, as able  Description: Interventions:  - Observe, teach, and assist patient with ADLS  - Monitor and promote a balance of rest/activity, with adequate nutrition and elimination   Outcome: Not Progressing     Problem: Ineffective Coping  Goal: Identifies ineffective coping skills  Outcome: Not Progressing  Goal: Identifies healthy coping skills  Outcome: Not Progressing  Goal: Demonstrates healthy coping skills  Outcome: Not Progressing  Goal: Participates in unit activities  Description: Interventions:  - Provide therapeutic environment   - Provide required programming   - Redirect inappropriate behaviors   Outcome: Not Progressing  Goal: Patient/Family participate in treatment and DC plans  Description: Interventions:  - Provide therapeutic environment  Outcome: Not Progressing  Goal: Patient/Family verbalizes awareness of resources  Outcome: Not Progressing      Problem: Depression  Goal: Verbalize thoughts and feelings  Description: Interventions:  - Assess and re-assess patient's level of risk   - Engage patient in 1:1 interactions, daily, for a minimum of 15 minutes   - Encourage patient to express feelings, fears, frustrations, hopes   Outcome: Not Progressing  Goal: Refrain from isolation  Description: Interventions:  - Develop a trusting relationship   - Encourage socialization   Outcome: Not Progressing  Goal: Attend and participate in unit activities, including therapeutic, recreational, and educational groups  Description: Interventions:  - Provide therapeutic and educational activities daily, encourage attendance and participation, and document same in the medical record   Outcome: Not Progressing  Goal: Complete daily ADLs, including personal hygiene independently, as able  Description: Interventions:  - Observe, teach, and assist patient with ADLS  -  Monitor and promote a balance of rest/activity, with adequate nutrition and elimination   Outcome: Not Progressing     Problem: Anxiety  Goal: Anxiety is at manageable level  Description: Interventions:  - Assess and monitor patient's anxiety level.   - Monitor for signs and symptoms (heart palpitations, chest pain, shortness of breath, headaches, nausea, feeling jumpy, restlessness, irritable, apprehensive).   - Collaborate with interdisciplinary team and initiate plan and interventions as ordered.  - [place] patient to unit/surroundings  - Explain treatment plan  - Encourage participation in care  - Encourage verbalization of concerns/fears  - Identify coping mechanisms  - Assist in developing anxiety-reducing skills  - Administer/offer alternative therapies  - Limit or eliminate stimulants  Outcome: Not Progressing     Problem: Alteration in Orientation  Goal: Interact with staff daily  Description: Interventions:  - Assess and re-assess patient's level of orientation  - Engage patient in 1 on 1 interactions,  daily, for a minimum of 15 minutes   - Establish rapport/trust with patient   Outcome: Not Progressing  Goal: Express concerns related to confused thinking related to:  Description: Interventions:  - Encourage patient to express feelings, fears, frustrations, hopes  - Assign consistent caregivers   - [place]/re-orient patient as needed  - Allow comfort items, as appropriate  - Provide visual cues, signs, etc.   Outcome: Not Progressing  Goal: Allow medical examinations, as recommended  Description: Interventions:  - Provide physical/neurological exams and/or referrals, per provider   Outcome: Not Progressing  Goal: Cooperate with recommended testing/procedures  Description: Interventions:  - Determine need for ancillary testing  - Observe for mental status changes  - Implement falls/precaution protocol   Outcome: Not Progressing  Goal: Attend and participate in unit activities, including therapeutic, recreational, and educational groups  Description: Interventions:  - Provide therapeutic and educational activities daily, encourage attendance and participation, and document same in the medical record   - Provide appropriate opportunities for reminiscence   - Provide a consistent daily routine   - Encourage family contact/visitation   Outcome: Not Progressing  Goal: Complete daily ADLs, including personal hygiene independently, as able  Description: Interventions:  - Observe, teach, and assist patient with ADLS  Outcome: Not Progressing     Problem: Individualized Interventions  Goal: Patient will verbalize appropriate use of telephone within 5 days  Description: Interventions:  - Treatment team to determine use of supervised phone privileges   Outcome: Not Progressing  Goal: Patient will verbalize need for hospitalization and will no longer attempt elopement within 5 days  Description: Interventions:  - Ongoing education to help patient understand need for hospitalization  Outcome: Not Progressing  Goal: Patient will  recognize inappropriate behaviors and develop alternative behaviors within 5 days  Description: Interventions:  - Patient in collaboration with Treatment Team will develop a behavior management plan to help identify effective coping skills to deal with stressors  Outcome: Not Progressing     Problem: Electroconvulsive therapy (ECT)  Goal: Verbalizes/displays adequate comfort level or baseline comfort level  Description: Interventions:  - Encourage patient to monitor pain and request assistance  - Assess pain using appropriate pain scale  - Administer analgesics based on type and severity of pain and evaluate response  - Implement non-pharmacological measures as appropriate and evaluate response  - Consider cultural and social influences on pain and pain management  - Notify physician/advanced practitioner if interventions unsuccessful or patient reports new pain  Outcome: Not Progressing  Goal: Achieves stable or improved neurological status  Description: INTERVENTIONS  - Monitor and report changes in neurological status  - Monitor vital signs such as temperature, blood pressure, glucose, and any other labs ordered   - Initiate measures to prevent increased intracranial pressure  - Monitor for seizure activity and implement precautions if appropriate      Outcome: Not Progressing  Goal: Achieves maximal functionality and self care  Description: INTERVENTIONS  - Monitor swallowing and airway patency with patient fatigue and changes in neurological status  - Encourage and assist patient to increase activity and self care.   - Encourage visually impaired, hearing impaired and aphasic patients to use assistive/communication devices  Outcome: Not Progressing  Goal: Maintain or return mobility to safest level of function  Description: INTERVENTIONS:  - Assess patient's ability to carry out ADLs; assess patient's baseline for ADL function and identify physical deficits which impact ability to perform ADLs (bathing, care of  mouth/teeth, toileting, grooming, dressing, etc.)  - Assess/evaluate cause of self-care deficits   - Assess range of motion  - Assess patient's mobility  - Assess patient's need for assistive devices and provide as appropriate  - Encourage maximum independence but intervene and supervise when necessary  - Involve family in performance of ADLs  - Assess for home care needs following discharge   - Consider OT consult to assist with ADL evaluation and planning for discharge  - Provide patient education as appropriate  Outcome: Not Progressing  Goal: Maintains adequate nutritional intake  Description: INTERVENTIONS:  - Monitor percentage of each meal consumed  - Identify factors contributing to decreased intake, treat as appropriate  - Assist with meals as needed  - Monitor I&O, weight, and lab values if indicated  - Obtain nutrition services referral as needed  Outcome: Not Progressing     Problem: DISCHARGE PLANNING - CARE MANAGEMENT  Goal: Discharge to post-acute care or home with appropriate resources  Description: INTERVENTIONS:  - Conduct assessment to determine patient/family and health care team treatment goals, and need for post-acute services based on payer coverage, community resources, and patient preferences, and barriers to discharge  - Address psychosocial, clinical, and financial barriers to discharge as identified in assessment in conjunction with the patient/family and health care team  - Arrange appropriate level of post-acute services according to patient’s   needs and preference and payer coverage in collaboration with the physician and health care team  - Communicate with and update the patient/family, physician, and health care team regarding progress on the discharge plan  - Arrange appropriate transportation to post-acute venues  Outcome: Not Progressing

## 2024-04-12 NOTE — PROGRESS NOTES
04/12/24 0735   Team Meeting   Meeting Type Daily Rounds   Team Members Present   Team Members Present Physician;Nurse;;Other (Discipline and Name)   Patient/Family Present   Patient Present No   Patient's Family Present No     In attendance:  Dr. Alex Thomas, MD Dr. Jordan Holter, DO Eveline Hunt, HOLLI Travis, MIGUEL Alvarado, [facility]  Carla Lux, W  Irais Mcdowell M.S.    Groups: 1/9    Pt had ECT this morning. Pt is isolative during the day then more engaged in the afternoons/evenings. Pt continues to report bothersome auditory hallucinations that are threatening him and his family; pt reports anxiety and depression. No bx issues noted.

## 2024-04-12 NOTE — PROGRESS NOTES
Psychiatry Progress Note [facility]    Alberto Berumen 28 y.o. male MRN: [id number]  Unit/Bed#: [facility] 107-01 Encounter: [id number]  Code Status: Level 1 - Full Code    PCP: Joce Juan MD    Date of Admission:  3/29/2024 2008   Date of Service:  04/12/24    Patient Active Problem List   Diagnosis    GERD (gastroesophageal reflux disease)    Medical clearance for psychiatric admission    Schizoaffective disorder, bipolar type (HCC)    Tobacco abuse    T wave inversion in EKG    Syringoma    Chronic idiopathic constipation    Vitamin B 12 deficiency    Vitamin D deficiency    Confluent and reticulate papillomatosis    Class 2 obesity in adult    Primary hypertension    Elevated hemoglobin A1c     Review of systems: Unremarkable but had thrown up once after breakfast as he ate too fast   diagnosis: Schizoaffective bipolar    Assessment  Overall Status: Patient had his first maintenance ECT today #15 which he tolerated well.  Still with same threatening voices that tell him that he and his family will be killed which has not changed much and has also tolerated the addition of Risperdal yesterday.  Still staying to himself hardly coming out except attending a few groups and not changing clothes and was reminded to with questionable hygiene and grooming   certification Statement: The patient will continue to require additional inpatient hospital stay due to ongoing voices that are threatening to kill him and his family lack of response to medications and ECT so far.     Medications: Clozapine 450 mg at bedtime, propranolol 10 mg every 12 hours as as needed for anxiety, Lexapro 20 mg once a day, atropine eyedrops sublingual for drooling of saliva and senna 2 tablets twice a day for constipation  All medications reviewed and I recommend they be continued for symptom management   side effects from treatment: None reported  Medication changes    Risperdal will be added as 1 mg as an adjunct with the  clozapine on board for treatment resistant schizophrenia   Medication education   Risks side effects benefits and precautions of medications discussed with patient and he did verbalize an understanding about risks for metabolic syndrome from being on neuroleptics and is form tardive dyskinesia etc. and special precautions about being on clozapine   Understanding of medications: Has some understanding   Justification for dual anti-psychotics: Not applicable    Non-pharmacological treatments  Continue with individual, group, milieu and occupational therapy using recovery principles and psycho-education about accepting illness and the need for treatment.   to contact aunt and explore ACT team referral which he never had  ECT to be continued  weekly for maintenance starting on 4/12/2024    Safety  Safety and communication plan established to target dynamic risk factors discussed above.    Discharge Plan   Back to his aunt with an ACT team    Interval Progress   Laying on bed most of the time appearing preoccupied and needed reminders to change clothes and take showers.  Does not always attend to many groups and is isolated to his room with minimal interaction with peers and is compliant with medications and has accepted ECTs and he believes they are helping and he is actually asking for more ECTs.  He reports voices are not commanding her or to hurt himself or others.  He is accepting willingly the maintenance ECT is starting today.  He has not been aggressive or agitated or threatening or demanding or self-abusive with no need for behavioral PRNs and he willingly started risperidone as an adjunctive treatment for treatment resistant schizophrenia on top of the clozapine which he has tolerated well without any major side effects.  He is in touch with his aunt and sister    Acceptance by patient: Accepting  Hopefulness in recovery: Living back with his aunt again  Involved in reintegration process: Talking  with his sister and aunt  Trusting in relationship with psychiatrist: Trusting   Sleep: Good  Appetite: Good  Compliance with Medications: Compliant  Group attendance: 1/9  Significant events: Status quo with no significant changes    Mental Status Exam  Appearance: age appropriate, improved grooming, looks older than stated age, overweight, with hair and dreadlocks casually dressed with a clean shaven face, fairly groomed with good eye contact again found laying on bed on in his room when approached with good eye contact better groomed today  behavior: cooperative, mildly anxious, evasive, gesturing, slow responses preoccupied internally attuned  Speech: normal rate, normal volume, normal pitch  Mood: dysphoric, anxious, reports he feels good with no good mood reactivity  Affect: constricted, inappropriate, mood-congruent  Thought Process: organized, logical, coherent, goal directed, linear, decreased rate of thoughts, slowing of thoughts, negative thinking, impaired abstract reasoning, concrete  Thought Content: paranoid ideation, some paranoia, grandiose ideas, intrusive thoughts, preoccupied, chronic,   no current suicidal or homicidal thoughts and no plans verbalized.  Still appears paranoid and does not report any other delusional beliefs.  No phobias obsessions compulsions or distorted body perceptions but appears fearful and paranoid   perceptual Disturbances: auditory hallucinations, appears preoccupied, appears responding to internal stimuli, not observed, reports voices tell him that he and his family are going to be killed sometimes from 1 person on more than one person talking to him most of the day claiming he has not seen much difference other than a little bit subdued with the ECT.  .  Hx Risk Factors: chronic psychiatric problems, chronic anxiety symptoms, chronic psychotic symptoms, his aunt possibly moving away  Sensorium: Alert and oriented x 3 spheres and situation  cognition: recent and remote  memory grossly intact  Consciousness: alert and awake  Attention: attention span and concentration are age appropriate  Intellect: appears to be of average intelligence  Insight: intact  Judgement: intact  Motor Activity: no abnormal movements     Vitals  Temp:  [97.1 °F (36.2 °C)-97.8 °F (36.6 °C)] 97.8 °F (36.6 °C)  HR:  [84-99] 90  Resp:  [16-20] 18  BP: (109-185)/() 127/80  SpO2:  [9 %-98 %] 98 %    Intake/Output Summary (Last 24 hours) at 4/12/2024 0805  Last data filed at 4/11/2024 1822  Gross per 24 hour   Intake 0 ml   Output --   Net 0 ml           Lab Results: All Labs For Current Hospital Admission Reviewed ANC on 4/2/24 was 5650, rest of labs OK but Vit D level is  low      Current Facility-Administered Medications   Medication Dose Route Frequency Provider Last Rate    acetaminophen  650 mg Oral Q6H PRN Jordan C Holter, DO      acetaminophen  650 mg Oral Q4H PRN Jordan C Holter, DO      acetaminophen  650 mg Oral Q6H PRN HOLLI Lion      albuterol  2.5 mg Nebulization Once PRN Elisa Workman MD      aluminum-magnesium hydroxide-simethicone  30 mL Oral Q4H PRN Jordan C Holter, DO      amLODIPine  5 mg Oral Daily HOLLI Lion      Artificial Tears  1 drop Both Eyes Q3H PRN Jordan C Holter, DO      atropine  1 drop Sublingual HS HOLLI Lion      atropine  1 drop Sublingual Daily PRN HOLLI Lion      haloperidol lactate  2.5 mg Intramuscular Q4H PRN Max 4/day STEVE LionNP      And    LORazepam  1 mg Intramuscular Q4H PRN Max 4/day HOLLI Lion      And    benztropine  0.5 mg Intramuscular Q4H PRN Max 4/day HOLLI Lion      benztropine  1 mg Intramuscular Q4H PRN Max 6/day Jordan C Holter,       haloperidol lactate  5 mg Intramuscular Q4H PRN Max 4/day HOLLI Lion      And    LORazepam  2 mg Intramuscular Q4H PRN Max 4/day HOLLI Lion      And    benztropine  1 mg Intramuscular Q4H PRN Max 4/day HOLLI Lion      benztropine  1 mg  Oral Q4H PRN Max 6/day HOLLI Lion      benztropine  1 mg Oral Q4H PRN Max 6/day Ion  Holter, DO      bisacodyl  10 mg Rectal Daily PRN HOLLI Lion      cloZAPine  450 mg Oral HS HOLLI Lion      cyanocobalamin  1,000 mcg Oral Daily HOLLI Galvan      hydrOXYzine HCL  50 mg Oral Q6H PRN Max 4/day [facility] Cresencioter, DO      Or    diphenhydrAMINE  50 mg Intramuscular Q6H PRN Ion  Holter, DO      hydrOXYzine HCL  50 mg Oral Q6H PRN Max 4/day HOLLI Lion      Or    diphenhydrAMINE  50 mg Intramuscular Q6H PRN HOLLI Lion      diphenhydrAMINE-zinc acetate   Topical BID PRN HOLLI Lion      ergocalciferol  50,000 Units Oral Weekly HOLLI Galvan      escitalopram  20 mg Oral Daily HOLLI Lion      haloperidol  1 mg Oral Q6H PRN HOLLI Lion      haloperidol  2.5 mg Oral Q4H PRN Max 4/day HOLLI Lion      haloperidol  5 mg Oral Q4H PRN Max 4/day HOLLI Lion      hydrocortisone   Topical 4x Daily PRN HOLLI Lion      hydrOXYzine HCL  100 mg Oral Q6H PRN Max 4/day [facility] Holter, DO      Or    LORazepam  2 mg Intramuscular Q6H PRN [facility] Holter, DO      hydrOXYzine HCL  100 mg Oral Q6H PRN Max 4/day HOLLI Lion      Or    LORazepam  2 mg Intramuscular Q6H PRN HOLLI Lion      hydrOXYzine HCL  25 mg Oral Q6H PRN Max 4/day [facility] Cresencioter, DO      ibuprofen  600 mg Oral Q8H PRN HOLLI Lion      lactated ringers  50 mL/hr Intravenous Continuous Ramya Arana MD Stopped (04/12/24 0738)    melatonin  3 mg Oral HS Ion Dupontter, DO      metFORMIN  500 mg Oral Daily With Breakfast HOLLI Lion      methocarbamol  500 mg Oral Q6H PRN HOLLI Lion      minocycline  100 mg Oral Q12H KAYLIE HOLLI Lion      nicotine polacrilex  2 mg Oral Q2H PRN Jordan C Holter, DO      OLANZapine  5 mg Oral Q4H PRN Max 3/day Jordan C Holter, DO      Or    OLANZapine  2.5 mg Intramuscular Q4H PRN  Max 3/day Ion FISCHER Holter, DO      OLANZapine  5 mg Oral Q3H PRN Max 3/day Ion FISCHER Holter, DO      Or    OLANZapine  5 mg Intramuscular Q3H PRN Max 3/day Ion FISCHER Holter, DO      OLANZapine  2.5 mg Oral Q4H PRN Max 6/day Ion FISCHER Holter, DO      ondansetron  4 mg Intravenous Once PRN Elisa Workman MD      ondansetron  4 mg Oral Q6H PRN HOLLI Lion      polyethylene glycol  17 g Oral Daily HOLLI Lion      polyethylene glycol  17 g Oral Daily PRN Jordan C Holter, DO      propranolol  10 mg Oral Q12H KAYLIE HOLLI Lion      risperiDONE  1 mg Oral HS Bora Rosario MD      senna-docusate sodium  1 tablet Oral Daily PRN Jordan C Holter, DO      senna-docusate sodium  2 tablet Oral BID HOLLI Lion      traZODone  50 mg Oral HS PRN HOLLI Lion      white petrolatum-mineral oil   Topical TID PRN HOLLI Lion         Counseling / Coordination of Care: Total floor / unit time spent today 15 minutes. Greater than 50% of total time was spent with the patient and / or family counseling and / or somewhat receptive to supportive listening and teaching positive coping skills to deal with symptom mangement.     Patient's Rights, confidentiality and exceptions to confidentiality, use of automated medical record, Behavioral Health Services staff access to medical record, and consent to treatment reviewed.    This note has been dictated and hence there may be problems with punctuation, spelling and formatting and if anyone has any concerns please address them to Dr. Rosario   This note is not shared with patient due to potential for making patient's condition worse by knowing the content of the note.

## 2024-04-13 PROCEDURE — 99232 SBSQ HOSP IP/OBS MODERATE 35: CPT | Performed by: PSYCHIATRY & NEUROLOGY

## 2024-04-13 RX ADMIN — CYANOCOBALAMIN TAB 1000 MCG 1000 MCG: 1000 TAB at 09:13

## 2024-04-13 RX ADMIN — NICOTINE POLACRILEX 2 MG: 2 GUM, CHEWING BUCCAL at 13:09

## 2024-04-13 RX ADMIN — MELATONIN TAB 3 MG 3 MG: 3 TAB at 21:33

## 2024-04-13 RX ADMIN — RISPERIDONE 1 MG: 0.25 TABLET, FILM COATED ORAL at 21:33

## 2024-04-13 RX ADMIN — ESCITALOPRAM OXALATE 20 MG: 10 TABLET, FILM COATED ORAL at 09:14

## 2024-04-13 RX ADMIN — NICOTINE POLACRILEX 2 MG: 2 GUM, CHEWING BUCCAL at 09:20

## 2024-04-13 RX ADMIN — MINOCYCLINE HYDROCHLORIDE 100 MG: 100 CAPSULE ORAL at 09:13

## 2024-04-13 RX ADMIN — METFORMIN HYDROCHLORIDE 500 MG: 500 TABLET ORAL at 09:14

## 2024-04-13 RX ADMIN — PROPRANOLOL HYDROCHLORIDE 10 MG: 10 TABLET ORAL at 09:13

## 2024-04-13 RX ADMIN — PROPRANOLOL HYDROCHLORIDE 10 MG: 10 TABLET ORAL at 21:33

## 2024-04-13 RX ADMIN — NICOTINE POLACRILEX 2 MG: 2 GUM, CHEWING BUCCAL at 15:58

## 2024-04-13 RX ADMIN — POLYETHYLENE GLYCOL 3350 17 G: 17 POWDER, FOR SOLUTION ORAL at 09:12

## 2024-04-13 RX ADMIN — SENNOSIDES AND DOCUSATE SODIUM 2 TABLET: 8.6; 5 TABLET ORAL at 17:08

## 2024-04-13 RX ADMIN — NICOTINE POLACRILEX 2 MG: 2 GUM, CHEWING BUCCAL at 21:39

## 2024-04-13 RX ADMIN — SENNOSIDES AND DOCUSATE SODIUM 2 TABLET: 8.6; 5 TABLET ORAL at 09:12

## 2024-04-13 RX ADMIN — ATROPINE SULFATE 1 DROP: 10 SOLUTION/ DROPS OPHTHALMIC at 21:33

## 2024-04-13 RX ADMIN — MINOCYCLINE HYDROCHLORIDE 100 MG: 100 CAPSULE ORAL at 21:33

## 2024-04-13 RX ADMIN — NICOTINE POLACRILEX 2 MG: 2 GUM, CHEWING BUCCAL at 18:40

## 2024-04-13 RX ADMIN — CLOZAPINE 450 MG: 25 TABLET ORAL at 21:33

## 2024-04-13 NOTE — NURSING NOTE
Alberto has been visible and social.  He has seeking nicotine gum every two hours. Reported having auditory hallucination.  Voices are threatening, but are non-commanding.  No issues.

## 2024-04-13 NOTE — PROGRESS NOTES
"Progress Note - Behavioral Health     Alberto Berumen 28 y.o. male MRN: [id number]  Unit/Bed#: [facility] 107-01 Encounter: [id number]      Alberto Berumen was seen for continuing care and reviewed with treatment team.  Pt has received 15 ECT Txs -- first maintenance Tx was yesterday and he has been tolerating them well.    Pt was in bed on approach, having returned there after breakfast.  He was agreeable to interview and reported feeling less depressed, \"Sometimes\" anxious -- last time was 2 nights ago, and felt his medications were helping a little, but the ECT helped more and he intends to continue both.  He feels that \"I'm not in my head as much\" due to the benefit of ECT.  However, he reports no change in his AH of threatening voices and paranoia-- feels \"Everybody\" is speaking negatively about him or following him at times.  Pt presently denies SI, HI, CAH, VH, or TH.  He reported sleeping and eating \"Good\" to which nursing basically concurred.  Pt voiced no somatic complaints.        Per EMR and floor team, the Pt has been calm and medication compliant.  He has reported to the team that ECT has helped with motivation to stay out of bed but not so much for his hallucinations.  He is isolative to his room, attending minimal therapeutic groups, but appropriately behaved when among peers.  He showered 4/11/2024 per nursing.      Sleep:Good  Appetite: Good   Medication side effects: None per Pt    ROS: No complaints per Pt, (All ROS Negative)    Labs/Tests: Reviewed    Mental Status Evaluation:  Appearance:  Dressed,    Behavior:  Calm, cooperative, pleasant   Speech:  Clear, normal rate and volume   Mood:  Less depressed, anxious   Affect:  Blunted   Thought Process:  Organized, Goal directed, suspicious   Associations: Intact associations   Thought Content:  Paranoid delusions   Perceptual Disturbances: +AH but no CAH other hallucinations . + paranoia but no RIS   Risk Potential: Pt presently denies SI or HI  "   Sensorium:  Self, birthday, Place, Day of the week, Month, Year   Memory:  short term memory grossly intact   Consciousness:  alert, awake   Attention: Good   Insight:  Fair   Judgment: Fair   Gait/Station: Normal gait/station, when he was observed walking   Motor Activity: No abnormal movements     Vitals:    04/12/24 0900 04/12/24 1513 04/12/24 2032 04/13/24 0910   BP: 140/81 146/86 135/76 118/62   BP Location: Right arm Right arm Right arm Right arm   Pulse: 95 103 102 86   Resp: 18 18  16   Temp: 97.8 °F (36.6 °C) 98 °F (36.7 °C)  98 °F (36.7 °C)   TempSrc: Skin Temporal  Temporal   SpO2: 98% 99%  99%   Weight:       Height:           Admission on 03/29/2024   Component Date Value    Clozapine Lvl 03/30/2024 636     WBC 04/01/2024 9.45     RBC 04/01/2024 5.36     Hemoglobin 04/01/2024 12.9     Hematocrit 04/01/2024 41.8     MCV 04/01/2024 78 (L)     MCH 04/01/2024 24.1 (L)     MCHC 04/01/2024 30.9 (L)     RDW 04/01/2024 13.5     MPV 04/01/2024 11.5     Platelets 04/01/2024 237     nRBC 04/01/2024 0     Segmented % 04/01/2024 59     Immature Grans % 04/01/2024 1     Lymphocytes % 04/01/2024 27     Monocytes % 04/01/2024 9     Eosinophils Relative 04/01/2024 3     Basophils Relative 04/01/2024 1     Absolute Neutrophils 04/01/2024 5.65     Absolute Immature Grans 04/01/2024 0.06     Absolute Lymphocytes 04/01/2024 2.58     Absolute Monocytes 04/01/2024 0.81     Eosinophils Absolute 04/01/2024 0.30     Basophils Absolute 04/01/2024 0.05     Total CK 04/01/2024 171     BNP 04/01/2024 9     HS TnI random 04/01/2024 4 (L)     Sodium 04/01/2024 139     Potassium 04/01/2024 4.1     Chloride 04/01/2024 103     CO2 04/01/2024 27     ANION GAP 04/01/2024 9     BUN 04/01/2024 12     Creatinine 04/01/2024 0.90     Glucose 04/01/2024 120     Calcium 04/01/2024 9.3     AST 04/01/2024 23     ALT 04/01/2024 53 (H)     Alkaline Phosphatase 04/01/2024 84     Total Protein 04/01/2024 7.2     Albumin 04/01/2024 4.0     Total  Bilirubin 04/01/2024 0.23     eGFR 04/01/2024 116     Hemoglobin A1C 04/01/2024 5.0     EAG 04/01/2024 97     Vitamin B-12 04/01/2024 268     Folate 04/01/2024 14.2     Vit D, 25-Hydroxy 04/01/2024 12.3 (L)     WBC 04/08/2024 9.19     RBC 04/08/2024 5.38     Hemoglobin 04/08/2024 12.7     Hematocrit 04/08/2024 41.9     MCV 04/08/2024 78 (L)     MCH 04/08/2024 23.6 (L)     MCHC 04/08/2024 30.3 (L)     RDW 04/08/2024 13.9     MPV 04/08/2024 10.7     Platelets 04/08/2024 234     nRBC 04/08/2024 0     Segmented % 04/08/2024 58     Immature Grans % 04/08/2024 0     Lymphocytes % 04/08/2024 29     Monocytes % 04/08/2024 10     Eosinophils Relative 04/08/2024 3     Basophils Relative 04/08/2024 0     Absolute Neutrophils 04/08/2024 5.39     Absolute Immature Grans 04/08/2024 0.02     Absolute Lymphocytes 04/08/2024 2.62     Absolute Monocytes 04/08/2024 0.89     Eosinophils Absolute 04/08/2024 0.23     Basophils Absolute 04/08/2024 0.04        Progress Toward Goals:  Based on today's interview and review of prior notes, Pt has made some improvement.    Continue the present medication regimen  Continue maintenance ECT   Pt remains on dual antipsychotic Tx (Clozapine and Risperidone) due to failure on monotherapy      Get Clozapine level, CBC with diff, CRP, HS Troponin, CK, and BNP  q Monday      Assessment/Plan   Principal Problem:    Schizoaffective disorder, bipolar type (HCC)  Active Problems:    GERD (gastroesophageal reflux disease)    Medical clearance for psychiatric admission    Tobacco abuse    T wave inversion in EKG    Chronic idiopathic constipation    Confluent and reticulate papillomatosis    Primary hypertension    Elevated hemoglobin A1c      Recommended Treatment: Continue with pharmacotherapy, group therapy, milieu therapy and occupational therapy.  The patient will be maintained on the following medications:  Current Facility-Administered Medications   Medication Dose Route Frequency Provider Last Rate     acetaminophen  650 mg Oral Q6H PRN Ion FISCHER Holter, DO      acetaminophen  650 mg Oral Q4H PRN Ion FISCHER Holter, DO      acetaminophen  650 mg Oral Q6H PRN HOLLI Lion      aluminum-magnesium hydroxide-simethicone  30 mL Oral Q4H PRN Ion FISCHER Holter, DO      amLODIPine  5 mg Oral Daily HOLLI Lion      Artificial Tears  1 drop Both Eyes Q3H PRN Jordan C Holter, DO      atropine  1 drop Sublingual HS HOLLI Lion      atropine  1 drop Sublingual Daily PRN HOLLI Lion      haloperidol lactate  2.5 mg Intramuscular Q4H PRN Max 4/day HOLLI Lion      And    LORazepam  1 mg Intramuscular Q4H PRN Max 4/day HOLLI Lion      And    benztropine  0.5 mg Intramuscular Q4H PRN Max 4/day HOLLI Lion      benztropine  1 mg Intramuscular Q4H PRN Max 6/day Jordan C Holter, DO      haloperidol lactate  5 mg Intramuscular Q4H PRN Max 4/day HOLLI Lion      And    LORazepam  2 mg Intramuscular Q4H PRN Max 4/day HOLLI Lion      And    benztropine  1 mg Intramuscular Q4H PRN Max 4/day HOLLI Lion      benztropine  1 mg Oral Q4H PRN Max 6/day HOLLI Lion      benztropine  1 mg Oral Q4H PRN Max 6/day Jordan C Holter, DO      bisacodyl  10 mg Rectal Daily PRN HOLLI Lion      cloZAPine  450 mg Oral HS HOLLI Lion      cyanocobalamin  1,000 mcg Oral Daily HOLLI Galvan      hydrOXYzine HCL  50 mg Oral Q6H PRN Max 4/day Ion Dupontter, DO      Or    diphenhydrAMINE  50 mg Intramuscular Q6H PRN Jordan C Holter, DO      hydrOXYzine HCL  50 mg Oral Q6H PRN Max 4/day HOLLI Lion      Or    diphenhydrAMINE  50 mg Intramuscular Q6H PRN HOLLI Lion      diphenhydrAMINE-zinc acetate   Topical BID PRN HOLLI Lion      ergocalciferol  50,000 Units Oral Weekly HOLLI Galvan      escitalopram  20 mg Oral Daily HOLLI Lion      haloperidol  1 mg Oral Q6H PRN HOLLI Lion      haloperidol  2.5 mg Oral Q4H  PRN Max 4/day HOLLI Lion      haloperidol  5 mg Oral Q4H PRN Max 4/day HOLLI Lion      hydrocortisone   Topical 4x Daily PRN HOLLI Lion      hydrOXYzine HCL  100 mg Oral Q6H PRN Max 4/day [facility] Holter, DO      Or    LORazepam  2 mg Intramuscular Q6H PRN [facility] Holter, DO      hydrOXYzine HCL  100 mg Oral Q6H PRN Max 4/day HOLLI Lion      Or    LORazepam  2 mg Intramuscular Q6H PRN HOLLI Lion      hydrOXYzine HCL  25 mg Oral Q6H PRN Max 4/day [facility] Holter, DO      ibuprofen  600 mg Oral Q8H PRN HOLLI Lion      melatonin  3 mg Oral HS [facility] Holter, DO      metFORMIN  500 mg Oral Daily With Breakfast HOLLI Lion      methocarbamol  500 mg Oral Q6H PRN HOLLI Lion      minocycline  100 mg Oral Q12H KAYLIE HOLLI Lion      nicotine polacrilex  2 mg Oral Q2H PRN [facility] Holter, DO      OLANZapine  5 mg Oral Q4H PRN Max 3/day [facility] Holter, DO      Or    OLANZapine  2.5 mg Intramuscular Q4H PRN Max 3/day [facility] Holter, DO      OLANZapine  5 mg Oral Q3H PRN Max 3/day [facility] Holter, DO      Or    OLANZapine  5 mg Intramuscular Q3H PRN Max 3/day [facility] Holter, DO      OLANZapine  2.5 mg Oral Q4H PRN Max 6/day [facility] Holter, DO      ondansetron  4 mg Oral Q6H PRN HOLLI Lion      polyethylene glycol  17 g Oral Daily HOLLI Lion      polyethylene glycol  17 g Oral Daily PRN [facility] Holter, DO      propranolol  10 mg Oral Q12H KAYLIE HOLLI Lion      risperiDONE  1 mg Oral HS Bora Rosario MD      senna-docusate sodium  1 tablet Oral Daily PRN [facility] Holter, DO      senna-docusate sodium  2 tablet Oral BID HOLLI Lion      traZODone  50 mg Oral HS PRN HOLLI Lion      white petrolatum-mineral oil   Topical TID PRN HOLLI Lion         Risks, benefits and possible side effects of Medications:   Risks, benefits, and possible side effects of medications explained to patient and patient verbalizes  understanding

## 2024-04-13 NOTE — NURSING NOTE
Alberto was woken by staff a few times to come out for breakfast. He kept falling back to sleep. Did not eat breakfast. He took medication at bedside. Amlodipine held due to BP parameters. Denies anxiety, depression and pain. Admits to voices, but did not elaborate on same. Attended Coping Skills this morning and Nursing group this afternoon. More visible in the milieu this afternoon. Minimal interaction with peers. Emily to make needs known to staff. Had Nicotine gum at 0920 and 1309. Ate 100% of lunch.  No issues or behaviors. Continue to monitor. Precautions maintained.

## 2024-04-13 NOTE — PLAN OF CARE
Problem: Alteration in Thoughts and Perception  Goal: Treatment Goal: Gain control of psychotic behaviors/thinking, reduce/eliminate presenting symptoms and demonstrate improved reality functioning upon discharge  Outcome: Progressing  Goal: Verbalize thoughts and feelings  Description: Interventions:  - Promote a nonjudgmental and trusting relationship with the patient through active listening and therapeutic communication  - Assess patient's level of functioning, behavior and potential for risk  - Engage patient in 1 on 1 interactions  - Encourage patient to express fears, feelings, frustrations, and discuss symptoms    - [place] patient to reality, help patient recognize reality-based thinking   - Administer medications as ordered and assess for potential side effects  - Provide the patient education related to the signs and symptoms of the illness and desired effects of prescribed medications  Outcome: Progressing  Goal: Agree to be compliant with medication regime, as prescribed and report medication side effects  Description: Interventions:  - Offer appropriate PRN medication and supervise ingestion; conduct AIMS, as needed   Outcome: Progressing  Goal: Attend and participate in unit activities, including therapeutic, recreational, and educational groups  Description: Interventions:  -Encourage Visitation and family involvement in care  Outcome: Progressing  Goal: Recognize dysfunctional thoughts, communicate reality-based thoughts at the time of discharge  Description: Interventions:  - Provide medication and psycho-education to assist patient in compliance and developing insight into his/her illness   Outcome: Progressing  Goal: Complete daily ADLs, including personal hygiene independently, as able  Description: Interventions:  - Observe, teach, and assist patient with ADLS  - Monitor and promote a balance of rest/activity, with adequate nutrition and elimination   Outcome: Progressing     Problem:  Ineffective Coping  Goal: Identifies ineffective coping skills  Outcome: Progressing  Goal: Identifies healthy coping skills  Outcome: Progressing  Goal: Demonstrates healthy coping skills  Outcome: Progressing  Goal: Participates in unit activities  Description: Interventions:  - Provide therapeutic environment   - Provide required programming   - Redirect inappropriate behaviors   Outcome: Progressing  Goal: Patient/Family participate in treatment and DC plans  Description: Interventions:  - Provide therapeutic environment  Outcome: Progressing  Goal: Patient/Family verbalizes awareness of resources  Outcome: Progressing     Problem: Depression  Goal: Treatment Goal: Demonstrate behavioral control of depressive symptoms, verbalize feelings of improved mood/affect, and adopt new coping skills prior to discharge  Outcome: Progressing  Goal: Verbalize thoughts and feelings  Description: Interventions:  - Assess and re-assess patient's level of risk   - Engage patient in 1:1 interactions, daily, for a minimum of 15 minutes   - Encourage patient to express feelings, fears, frustrations, hopes   Outcome: Progressing  Goal: Refrain from harming self  Description: Interventions:  - Monitor patient closely, per order   - Supervise medication ingestion, monitor effects and side effects   Outcome: Progressing  Goal: Refrain from isolation  Description: Interventions:  - Develop a trusting relationship   - Encourage socialization   Outcome: Progressing  Goal: Refrain from self-neglect  Outcome: Progressing  Goal: Attend and participate in unit activities, including therapeutic, recreational, and educational groups  Description: Interventions:  - Provide therapeutic and educational activities daily, encourage attendance and participation, and document same in the medical record   Outcome: Progressing  Goal: Complete daily ADLs, including personal hygiene independently, as able  Description: Interventions:  - Observe, teach,  and assist patient with ADLS  -  Monitor and promote a balance of rest/activity, with adequate nutrition and elimination   Outcome: Progressing     Problem: Anxiety  Goal: Anxiety is at manageable level  Description: Interventions:  - Assess and monitor patient's anxiety level.   - Monitor for signs and symptoms (heart palpitations, chest pain, shortness of breath, headaches, nausea, feeling jumpy, restlessness, irritable, apprehensive).   - Collaborate with interdisciplinary team and initiate plan and interventions as ordered.  - [place] patient to unit/surroundings  - Explain treatment plan  - Encourage participation in care  - Encourage verbalization of concerns/fears  - Identify coping mechanisms  - Assist in developing anxiety-reducing skills  - Administer/offer alternative therapies  - Limit or eliminate stimulants  Outcome: Progressing     Problem: Alteration in Orientation  Goal: Treatment Goal: Demonstrate a reduction of confusion and improved orientation to person, place, time and/or situation upon discharge, according to optimum baseline/ability  Outcome: Progressing  Goal: Interact with staff daily  Description: Interventions:  - Assess and re-assess patient's level of orientation  - Engage patient in 1 on 1 interactions, daily, for a minimum of 15 minutes   - Establish rapport/trust with patient   Outcome: Progressing  Goal: Express concerns related to confused thinking related to:  Description: Interventions:  - Encourage patient to express feelings, fears, frustrations, hopes  - Assign consistent caregivers   - [place]/re-orient patient as needed  - Allow comfort items, as appropriate  - Provide visual cues, signs, etc.   Outcome: Progressing  Goal: Allow medical examinations, as recommended  Description: Interventions:  - Provide physical/neurological exams and/or referrals, per provider   Outcome: Progressing  Goal: Cooperate with recommended testing/procedures  Description: Interventions:  -  Determine need for ancillary testing  - Observe for mental status changes  - Implement falls/precaution protocol   Outcome: Progressing  Goal: Attend and participate in unit activities, including therapeutic, recreational, and educational groups  Description: Interventions:  - Provide therapeutic and educational activities daily, encourage attendance and participation, and document same in the medical record   - Provide appropriate opportunities for reminiscence   - Provide a consistent daily routine   - Encourage family contact/visitation   Outcome: Progressing  Goal: Complete daily ADLs, including personal hygiene independently, as able  Description: Interventions:  - Observe, teach, and assist patient with ADLS  Outcome: Progressing     Problem: Individualized Interventions  Goal: Patient will verbalize appropriate use of telephone within 5 days  Description: Interventions:  - Treatment team to determine use of supervised phone privileges   Outcome: Progressing  Goal: Patient will verbalize need for hospitalization and will no longer attempt elopement within 5 days  Description: Interventions:  - Ongoing education to help patient understand need for hospitalization  Outcome: Progressing  Goal: Patient will recognize inappropriate behaviors and develop alternative behaviors within 5 days  Description: Interventions:  - Patient in collaboration with Treatment Team will develop a behavior management plan to help identify effective coping skills to deal with stressors  Outcome: Progressing     Problem: Electroconvulsive therapy (ECT)  Goal: Treatment Goal: Demonstrate a reduction of confusion and improved orientation to person, place, time and/or situation upon discharge, according to optimum baseline/ability  Outcome: Progressing  Goal: Verbalizes/displays adequate comfort level or baseline comfort level  Description: Interventions:  - Encourage patient to monitor pain and request assistance  - Assess pain using  appropriate pain scale  - Administer analgesics based on type and severity of pain and evaluate response  - Implement non-pharmacological measures as appropriate and evaluate response  - Consider cultural and social influences on pain and pain management  - Notify physician/advanced practitioner if interventions unsuccessful or patient reports new pain  Outcome: Progressing  Goal: Achieves stable or improved neurological status  Description: INTERVENTIONS  - Monitor and report changes in neurological status  - Monitor vital signs such as temperature, blood pressure, glucose, and any other labs ordered   - Initiate measures to prevent increased intracranial pressure  - Monitor for seizure activity and implement precautions if appropriate      Outcome: Progressing  Goal: Achieves maximal functionality and self care  Description: INTERVENTIONS  - Monitor swallowing and airway patency with patient fatigue and changes in neurological status  - Encourage and assist patient to increase activity and self care.   - Encourage visually impaired, hearing impaired and aphasic patients to use assistive/communication devices  Outcome: Progressing  Goal: Maintain or return mobility to safest level of function  Description: INTERVENTIONS:  - Assess patient's ability to carry out ADLs; assess patient's baseline for ADL function and identify physical deficits which impact ability to perform ADLs (bathing, care of mouth/teeth, toileting, grooming, dressing, etc.)  - Assess/evaluate cause of self-care deficits   - Assess range of motion  - Assess patient's mobility  - Assess patient's need for assistive devices and provide as appropriate  - Encourage maximum independence but intervene and supervise when necessary  - Involve family in performance of ADLs  - Assess for home care needs following discharge   - Consider OT consult to assist with ADL evaluation and planning for discharge  - Provide patient education as appropriate  Outcome:  Progressing  Goal: Absence of urinary retention  Description: INTERVENTIONS:  - Assess patient’s ability to void and empty bladder  - Monitor I/O  - Bladder scan as needed  - Discuss with physician/AP medications to alleviate retention as needed  - Discuss catheterization for long term situations as appropriate  Outcome: Progressing  Goal: Minimal or absence of nausea and/or vomiting  Description: INTERVENTIONS:  - Administer IV fluids if ordered to ensure adequate hydration  - Maintain NPO status until nausea and vomiting are resolved  - Nasogastric tube if ordered  - Administer ordered antiemetic medications as needed  - Provide nonpharmacologic comfort measures as appropriate  - Advance diet as tolerated, if ordered  - Consider nutrition services referral to assist patient with adequate nutrition and appropriate food choices  Outcome: Progressing  Goal: Maintains adequate nutritional intake  Description: INTERVENTIONS:  - Monitor percentage of each meal consumed  - Identify factors contributing to decreased intake, treat as appropriate  - Assist with meals as needed  - Monitor I&O, weight, and lab values if indicated  - Obtain nutrition services referral as needed  Outcome: Progressing     Problem: DISCHARGE PLANNING - CARE MANAGEMENT  Goal: Discharge to post-acute care or home with appropriate resources  Description: INTERVENTIONS:  - Conduct assessment to determine patient/family and health care team treatment goals, and need for post-acute services based on payer coverage, community resources, and patient preferences, and barriers to discharge  - Address psychosocial, clinical, and financial barriers to discharge as identified in assessment in conjunction with the patient/family and health care team  - Arrange appropriate level of post-acute services according to patient’s   needs and preference and payer coverage in collaboration with the physician and health care team  - Communicate with and update the  patient/family, physician, and health care team regarding progress on the discharge plan  - Arrange appropriate transportation to post-acute venues  Outcome: Progressing     Problem: Alteration in Thoughts and Perception  Goal: Refrain from acting on delusional thinking/internal stimuli  Description: Interventions:  - Monitor patient closely, per order   - Utilize least restrictive measures   - Set reasonable limits, give positive feedback for acceptable   - Administer medications as ordered and monitor of potential side effects  Outcome: Not Progressing

## 2024-04-13 NOTE — PROGRESS NOTES
"Progress Note - Behavioral Health     Alberto Berumen 28 y.o. male MRN: [id number]  Unit/Bed#: [facility] 107-01 Encounter: [id number]      Alberto Berumen was seen for continuing care and reviewed with treatment team.  Pt was in bed on approach but cooperative for interview and reported \"The depression is the same, the anxiety is not really there.\"  His AH and paranoia are the same as well and he is amenable to continuing ECT and his current medications.  He prays as a coping skill.  Pt does report, when asked about a future plan, that he wants to take care of family and himself but does not elaborate on this.  Pt presently denies SI, HI, CAH, VH or TH.   He reported sleeping and eating well to which nursing concurred.               Per EMR and floor team the Pt has remained calm, cooperative and medication compliant through the weekend.  Pt was more visible in the milieu in the afternoon yesterday but was socially isolative.  He did attend multiple groups with appropriate behavior              Sleep:Good  Appetite: Good   Medication side effects: None per Pt    ROS: No complaints per Pt, (All ROS Negative)    Labs/Tests: Reviewed    Mental Status Evaluation:  Appearance:  Dressed, fair eye contact,    Behavior:  Calm, cooperative, pleasant   Speech:  Clear, normal rate and volume, not very spontaneous but answered all questions   Mood:  Depressed   Affect:  Blunted   Thought Process:  Organized, Goal directed   Associations: Intact associations   Thought Content:  No verbalized delusions   Perceptual Disturbances: +AH and paranoia but no CAH, VH, or TH.  Pt does not appear to be RIS   Risk Potential: Pt presently denies SI or HI    Sensorium:  Self, birthday, Place, Day of the week, Month, Year   Memory:  short term memory grossly intact   Consciousness:  alert, awake   Attention: Good   Insight:  Fair   Judgment: Fair   Gait/Station: Normal gait/station when he was observed walking   Motor Activity: No abnormal " movements     Vitals:    04/13/24 0910 04/13/24 1548 04/13/24 2045 04/14/24 0750   BP: 118/62 132/78 130/72 136/66   BP Location: Right arm Right arm Left arm Right arm   Pulse: 86 104 (!) 106 82   Resp: 16 18 18 18   Temp: 98 °F (36.7 °C) 97.5 °F (36.4 °C) 98.2 °F (36.8 °C) 98 °F (36.7 °C)   TempSrc: Temporal Temporal Temporal Temporal   SpO2: 99% 98% 100% 99%   Weight:       Height:           Admission on 03/29/2024   Component Date Value    Clozapine Lvl 03/30/2024 636     WBC 04/01/2024 9.45     RBC 04/01/2024 5.36     Hemoglobin 04/01/2024 12.9     Hematocrit 04/01/2024 41.8     MCV 04/01/2024 78 (L)     MCH 04/01/2024 24.1 (L)     MCHC 04/01/2024 30.9 (L)     RDW 04/01/2024 13.5     MPV 04/01/2024 11.5     Platelets 04/01/2024 237     nRBC 04/01/2024 0     Segmented % 04/01/2024 59     Immature Grans % 04/01/2024 1     Lymphocytes % 04/01/2024 27     Monocytes % 04/01/2024 9     Eosinophils Relative 04/01/2024 3     Basophils Relative 04/01/2024 1     Absolute Neutrophils 04/01/2024 5.65     Absolute Immature Grans 04/01/2024 0.06     Absolute Lymphocytes 04/01/2024 2.58     Absolute Monocytes 04/01/2024 0.81     Eosinophils Absolute 04/01/2024 0.30     Basophils Absolute 04/01/2024 0.05     Total CK 04/01/2024 171     BNP 04/01/2024 9     HS TnI random 04/01/2024 4 (L)     Sodium 04/01/2024 139     Potassium 04/01/2024 4.1     Chloride 04/01/2024 103     CO2 04/01/2024 27     ANION GAP 04/01/2024 9     BUN 04/01/2024 12     Creatinine 04/01/2024 0.90     Glucose 04/01/2024 120     Calcium 04/01/2024 9.3     AST 04/01/2024 23     ALT 04/01/2024 53 (H)     Alkaline Phosphatase 04/01/2024 84     Total Protein 04/01/2024 7.2     Albumin 04/01/2024 4.0     Total Bilirubin 04/01/2024 0.23     eGFR 04/01/2024 116     Hemoglobin A1C 04/01/2024 5.0     EAG 04/01/2024 97     Vitamin B-12 04/01/2024 268     Folate 04/01/2024 14.2     Vit D, 25-Hydroxy 04/01/2024 12.3 (L)     WBC 04/08/2024 9.19     RBC 04/08/2024 5.38      Hemoglobin 04/08/2024 12.7     Hematocrit 04/08/2024 41.9     MCV 04/08/2024 78 (L)     MCH 04/08/2024 23.6 (L)     MCHC 04/08/2024 30.3 (L)     RDW 04/08/2024 13.9     MPV 04/08/2024 10.7     Platelets 04/08/2024 234     nRBC 04/08/2024 0     Segmented % 04/08/2024 58     Immature Grans % 04/08/2024 0     Lymphocytes % 04/08/2024 29     Monocytes % 04/08/2024 10     Eosinophils Relative 04/08/2024 3     Basophils Relative 04/08/2024 0     Absolute Neutrophils 04/08/2024 5.39     Absolute Immature Grans 04/08/2024 0.02     Absolute Lymphocytes 04/08/2024 2.62     Absolute Monocytes 04/08/2024 0.89     Eosinophils Absolute 04/08/2024 0.23     Basophils Absolute 04/08/2024 0.04        Progress Toward Goals:  Based on today's interview and review of prior notes, No change through the weekend.  Continue the present medication regimen  Continue maintenance ECT  Pt remains on dual antipsychotic Tx (Risperidone and Clozapine) due to failure on monotherapy      Clozapine level, CBC with diff, CRP, HS Troponin, CK, and BNP to be done weekly q Monday     Assessment/Plan   Principal Problem:    Schizoaffective disorder, bipolar type (HCC)  Active Problems:    GERD (gastroesophageal reflux disease)    Medical clearance for psychiatric admission    Tobacco abuse    T wave inversion in EKG    Chronic idiopathic constipation    Confluent and reticulate papillomatosis    Primary hypertension    Elevated hemoglobin A1c      Recommended Treatment: Continue with pharmacotherapy, group therapy, milieu therapy and occupational therapy.  The patient will be maintained on the following medications:  Current Facility-Administered Medications   Medication Dose Route Frequency Provider Last Rate    acetaminophen  650 mg Oral Q4H PRN Jordan C Holter,       acetaminophen  650 mg Oral Q6H PRN HOLLI Lion      aluminum-magnesium hydroxide-simethicone  30 mL Oral Q4H PRN Jordan C Holter, DO      amLODIPine  5 mg Oral Daily Eveline  HOLLI Hunt      Artificial Tears  1 drop Both Eyes Q3H PRN Ion FISCHER Holjorden, DO      atropine  1 drop Sublingual HS HOLLI Lion      atropine  1 drop Sublingual Daily PRN HOLLI Lion      haloperidol lactate  2.5 mg Intramuscular Q4H PRN Max 4/day HOLLI Lion      And    LORazepam  1 mg Intramuscular Q4H PRN Max 4/day HOLLI Lion      And    benztropine  0.5 mg Intramuscular Q4H PRN Max 4/day HOLLI Lion      benztropine  1 mg Intramuscular Q4H PRN Max 6/day Ion FISCHER Holjorden, DO      haloperidol lactate  5 mg Intramuscular Q4H PRN Max 4/day HOLLI Lion      And    LORazepam  2 mg Intramuscular Q4H PRN Max 4/day HOLLI Lion      And    benztropine  1 mg Intramuscular Q4H PRN Max 4/day HOLLI Lion      benztropine  1 mg Oral Q4H PRN Max 6/day HOLLI Lion      benztropine  1 mg Oral Q4H PRN Max 6/day Jordan C Holter, DO      bisacodyl  10 mg Rectal Daily PRN HOLLI Lion      cloZAPine  450 mg Oral HS HOLLI Lion      cyanocobalamin  1,000 mcg Oral Daily HOLLI Galvan      hydrOXYzine HCL  50 mg Oral Q6H PRN Max 4/day Jordan C Holter, DO      Or    diphenhydrAMINE  50 mg Intramuscular Q6H PRN Jordan C Holter, DO      hydrOXYzine HCL  50 mg Oral Q6H PRN Max 4/day HOLLI Lion      Or    diphenhydrAMINE  50 mg Intramuscular Q6H PRN HOLLI Lion      diphenhydrAMINE-zinc acetate   Topical BID PRN HOLLI Lion      ergocalciferol  50,000 Units Oral Weekly HOLLI Galvan      escitalopram  20 mg Oral Daily HOLLI Lion      haloperidol  1 mg Oral Q6H PRN HOLLI Lion      haloperidol  2.5 mg Oral Q4H PRN Max 4/day HOLLI Lion      haloperidol  5 mg Oral Q4H PRN Max 4/day HOLLI Lion      hydrocortisone   Topical 4x Daily PRN HOLLI Lion      hydrOXYzine HCL  100 mg Oral Q6H PRN Max 4/day Jordan C Holter, DO      Or    LORazepam  2 mg Intramuscular Q6H PRN Jordan C Holter, DO       hydrOXYzine HCL  100 mg Oral Q6H PRN Max 4/day HOLLI Lion      Or    LORazepam  2 mg Intramuscular Q6H PRN HOLLI Lion      hydrOXYzine HCL  25 mg Oral Q6H PRN Max 4/day [facility] Holter, DO      ibuprofen  600 mg Oral Q8H PRN HOLLI Lion      melatonin  3 mg Oral HS [facility] Holter, DO      metFORMIN  500 mg Oral Daily With Breakfast HOLLI Lion      methocarbamol  500 mg Oral Q6H PRN HOLLI Lion      minocycline  100 mg Oral Q12H [facility] HOLLI Lion      nicotine polacrilex  2 mg Oral Q2H PRN [facility] Holter, DO      OLANZapine  5 mg Oral Q4H PRN Max 3/day [facility] Holter, DO      Or    OLANZapine  2.5 mg Intramuscular Q4H PRN Max 3/day [facility] Holter, DO      OLANZapine  5 mg Oral Q3H PRN Max 3/day [facility] Holter, DO      Or    OLANZapine  5 mg Intramuscular Q3H PRN Max 3/day [facility] Holter, DO      OLANZapine  2.5 mg Oral Q4H PRN Max 6/day [facility] Holter, DO      ondansetron  4 mg Oral Q6H PRN HOLLI Lion      polyethylene glycol  17 g Oral Daily HOLLI Lion      polyethylene glycol  17 g Oral Daily PRN [facility] Holter, DO      propranolol  10 mg Oral Q12H [facility] HOLLI Lion      risperiDONE  1 mg Oral HS Bora Rosario MD      senna-docusate sodium  1 tablet Oral Daily PRN [facility] Holter, DO      senna-docusate sodium  2 tablet Oral BID HOLLI Lion      traZODone  50 mg Oral HS PRN HOLLI Lion      white petrolatum-mineral oil   Topical TID PRN HOLLI Lion         Risks, benefits and possible side effects of Medications:   Risks, benefits, and possible side effects of medications explained to patient and patient verbalizes understanding

## 2024-04-14 PROCEDURE — 99232 SBSQ HOSP IP/OBS MODERATE 35: CPT | Performed by: PSYCHIATRY & NEUROLOGY

## 2024-04-14 RX ADMIN — RISPERIDONE 1 MG: 0.25 TABLET, FILM COATED ORAL at 21:16

## 2024-04-14 RX ADMIN — MINOCYCLINE HYDROCHLORIDE 100 MG: 100 CAPSULE ORAL at 08:30

## 2024-04-14 RX ADMIN — MELATONIN TAB 3 MG 3 MG: 3 TAB at 21:16

## 2024-04-14 RX ADMIN — CYANOCOBALAMIN TAB 1000 MCG 1000 MCG: 1000 TAB at 08:30

## 2024-04-14 RX ADMIN — ATROPINE SULFATE 1 DROP: 10 SOLUTION/ DROPS OPHTHALMIC at 21:25

## 2024-04-14 RX ADMIN — NICOTINE POLACRILEX 2 MG: 2 GUM, CHEWING BUCCAL at 19:32

## 2024-04-14 RX ADMIN — ESCITALOPRAM OXALATE 20 MG: 10 TABLET, FILM COATED ORAL at 08:30

## 2024-04-14 RX ADMIN — POLYETHYLENE GLYCOL 3350 17 G: 17 POWDER, FOR SOLUTION ORAL at 08:30

## 2024-04-14 RX ADMIN — NICOTINE POLACRILEX 2 MG: 2 GUM, CHEWING BUCCAL at 21:37

## 2024-04-14 RX ADMIN — SENNOSIDES AND DOCUSATE SODIUM 2 TABLET: 8.6; 5 TABLET ORAL at 08:30

## 2024-04-14 RX ADMIN — AMLODIPINE BESYLATE 5 MG: 5 TABLET ORAL at 08:30

## 2024-04-14 RX ADMIN — NICOTINE POLACRILEX 2 MG: 2 GUM, CHEWING BUCCAL at 12:32

## 2024-04-14 RX ADMIN — CLOZAPINE 450 MG: 25 TABLET ORAL at 21:16

## 2024-04-14 RX ADMIN — NICOTINE POLACRILEX 2 MG: 2 GUM, CHEWING BUCCAL at 17:13

## 2024-04-14 RX ADMIN — PROPRANOLOL HYDROCHLORIDE 10 MG: 10 TABLET ORAL at 08:30

## 2024-04-14 RX ADMIN — METFORMIN HYDROCHLORIDE 500 MG: 500 TABLET ORAL at 08:29

## 2024-04-14 RX ADMIN — SENNOSIDES AND DOCUSATE SODIUM 2 TABLET: 8.6; 5 TABLET ORAL at 17:13

## 2024-04-14 RX ADMIN — PROPRANOLOL HYDROCHLORIDE 10 MG: 10 TABLET ORAL at 21:16

## 2024-04-14 RX ADMIN — MINOCYCLINE HYDROCHLORIDE 100 MG: 100 CAPSULE ORAL at 21:16

## 2024-04-14 NOTE — NURSING NOTE
Alberto has been visible and social.  Pleasant and cooperative.  Requesting nicotine gum throughout the shift.  Med and meal compliant.  Watched television in the Living room.  No unmet needs.  Safety precautions ongoing.

## 2024-04-14 NOTE — NURSING NOTE
More visible in the milieu and interacting more with peers this evening. He went out on the deck for [employer]. Ate 100% of his meal. He needed reminding that he should only eat/drink what is on his meal tray.  He drank an Ensure from a female peers tray. Took pills without difficulty. Had Nicotine gum at 1713. Behavior controlled. Continue to monitor. Precautions maintained.

## 2024-04-14 NOTE — NURSING NOTE
Alberto has been isolative to his room/self most of the day. Pleasant and cooperative upon approach. Minimal interaction with peers. Able to make needs known to staff. Denies anxiety and pain. Admits to depression and hearing voices. Ate 100% of melas. Took pills without difficulty. No issues or behaviors. Weekly Wellness Assessment WDL, except dry patches of skin on mid/lower back. Patient putting lotion on same.  Did not attend any groups this morning. Continue to monitor. Precautions maintained.

## 2024-04-14 NOTE — PLAN OF CARE
Problem: Alteration in Thoughts and Perception  Goal: Treatment Goal: Gain control of psychotic behaviors/thinking, reduce/eliminate presenting symptoms and demonstrate improved reality functioning upon discharge  Outcome: Progressing  Goal: Verbalize thoughts and feelings  Description: Interventions:  - Promote a nonjudgmental and trusting relationship with the patient through active listening and therapeutic communication  - Assess patient's level of functioning, behavior and potential for risk  - Engage patient in 1 on 1 interactions  - Encourage patient to express fears, feelings, frustrations, and discuss symptoms    - [place] patient to reality, help patient recognize reality-based thinking   - Administer medications as ordered and assess for potential side effects  - Provide the patient education related to the signs and symptoms of the illness and desired effects of prescribed medications  Outcome: Progressing  Goal: Refrain from acting on delusional thinking/internal stimuli  Description: Interventions:  - Monitor patient closely, per order   - Utilize least restrictive measures   - Set reasonable limits, give positive feedback for acceptable   - Administer medications as ordered and monitor of potential side effects  Outcome: Progressing  Goal: Agree to be compliant with medication regime, as prescribed and report medication side effects  Description: Interventions:  - Offer appropriate PRN medication and supervise ingestion; conduct AIMS, as needed   Outcome: Progressing  Goal: Attend and participate in unit activities, including therapeutic, recreational, and educational groups  Description: Interventions:  -Encourage Visitation and family involvement in care  Outcome: Progressing  Goal: Recognize dysfunctional thoughts, communicate reality-based thoughts at the time of discharge  Description: Interventions:  - Provide medication and psycho-education to assist patient in compliance and developing  insight into his/her illness   Outcome: Progressing  Goal: Complete daily ADLs, including personal hygiene independently, as able  Description: Interventions:  - Observe, teach, and assist patient with ADLS  - Monitor and promote a balance of rest/activity, with adequate nutrition and elimination   Outcome: Progressing     Problem: Ineffective Coping  Goal: Identifies ineffective coping skills  Outcome: Progressing  Goal: Identifies healthy coping skills  Outcome: Progressing  Goal: Demonstrates healthy coping skills  Outcome: Progressing  Goal: Participates in unit activities  Description: Interventions:  - Provide therapeutic environment   - Provide required programming   - Redirect inappropriate behaviors   Outcome: Progressing  Goal: Patient/Family participate in treatment and DC plans  Description: Interventions:  - Provide therapeutic environment  Outcome: Progressing  Goal: Patient/Family verbalizes awareness of resources  Outcome: Progressing     Problem: Depression  Goal: Treatment Goal: Demonstrate behavioral control of depressive symptoms, verbalize feelings of improved mood/affect, and adopt new coping skills prior to discharge  Outcome: Progressing  Goal: Verbalize thoughts and feelings  Description: Interventions:  - Assess and re-assess patient's level of risk   - Engage patient in 1:1 interactions, daily, for a minimum of 15 minutes   - Encourage patient to express feelings, fears, frustrations, hopes   Outcome: Progressing  Goal: Refrain from harming self  Description: Interventions:  - Monitor patient closely, per order   - Supervise medication ingestion, monitor effects and side effects   Outcome: Progressing  Goal: Refrain from isolation  Description: Interventions:  - Develop a trusting relationship   - Encourage socialization   Outcome: Progressing  Goal: Refrain from self-neglect  Outcome: Progressing  Goal: Attend and participate in unit activities, including therapeutic, recreational, and  educational groups  Description: Interventions:  - Provide therapeutic and educational activities daily, encourage attendance and participation, and document same in the medical record   Outcome: Progressing  Goal: Complete daily ADLs, including personal hygiene independently, as able  Description: Interventions:  - Observe, teach, and assist patient with ADLS  -  Monitor and promote a balance of rest/activity, with adequate nutrition and elimination   Outcome: Progressing     Problem: Anxiety  Goal: Anxiety is at manageable level  Description: Interventions:  - Assess and monitor patient's anxiety level.   - Monitor for signs and symptoms (heart palpitations, chest pain, shortness of breath, headaches, nausea, feeling jumpy, restlessness, irritable, apprehensive).   - Collaborate with interdisciplinary team and initiate plan and interventions as ordered.  - Staples patient to unit/surroundings  - Explain treatment plan  - Encourage participation in care  - Encourage verbalization of concerns/fears  - Identify coping mechanisms  - Assist in developing anxiety-reducing skills  - Administer/offer alternative therapies  - Limit or eliminate stimulants  Outcome: Progressing     Problem: Alteration in Orientation  Goal: Treatment Goal: Demonstrate a reduction of confusion and improved orientation to person, place, time and/or situation upon discharge, according to optimum baseline/ability  Outcome: Progressing  Goal: Interact with staff daily  Description: Interventions:  - Assess and re-assess patient's level of orientation  - Engage patient in 1 on 1 interactions, daily, for a minimum of 15 minutes   - Establish rapport/trust with patient   Outcome: Progressing  Goal: Express concerns related to confused thinking related to:  Description: Interventions:  - Encourage patient to express feelings, fears, frustrations, hopes  - Assign consistent caregivers   - Staples/re-orient patient as needed  - Allow comfort items, as  appropriate  - Provide visual cues, signs, etc.   Outcome: Progressing  Goal: Allow medical examinations, as recommended  Description: Interventions:  - Provide physical/neurological exams and/or referrals, per provider   Outcome: Progressing  Goal: Cooperate with recommended testing/procedures  Description: Interventions:  - Determine need for ancillary testing  - Observe for mental status changes  - Implement falls/precaution protocol   Outcome: Progressing  Goal: Attend and participate in unit activities, including therapeutic, recreational, and educational groups  Description: Interventions:  - Provide therapeutic and educational activities daily, encourage attendance and participation, and document same in the medical record   - Provide appropriate opportunities for reminiscence   - Provide a consistent daily routine   - Encourage family contact/visitation   Outcome: Progressing  Goal: Complete daily ADLs, including personal hygiene independently, as able  Description: Interventions:  - Observe, teach, and assist patient with ADLS  Outcome: Progressing     Problem: Individualized Interventions  Goal: Patient will verbalize appropriate use of telephone within 5 days  Description: Interventions:  - Treatment team to determine use of supervised phone privileges   Outcome: Progressing  Goal: Patient will verbalize need for hospitalization and will no longer attempt elopement within 5 days  Description: Interventions:  - Ongoing education to help patient understand need for hospitalization  Outcome: Progressing  Goal: Patient will recognize inappropriate behaviors and develop alternative behaviors within 5 days  Description: Interventions:  - Patient in collaboration with Treatment Team will develop a behavior management plan to help identify effective coping skills to deal with stressors  Outcome: Progressing     Problem: Electroconvulsive therapy (ECT)  Goal: Treatment Goal: Demonstrate a reduction of confusion  and improved orientation to person, place, time and/or situation upon discharge, according to optimum baseline/ability  Outcome: Progressing  Goal: Verbalizes/displays adequate comfort level or baseline comfort level  Description: Interventions:  - Encourage patient to monitor pain and request assistance  - Assess pain using appropriate pain scale  - Administer analgesics based on type and severity of pain and evaluate response  - Implement non-pharmacological measures as appropriate and evaluate response  - Consider cultural and social influences on pain and pain management  - Notify physician/advanced practitioner if interventions unsuccessful or patient reports new pain  Outcome: Progressing  Goal: Achieves stable or improved neurological status  Description: INTERVENTIONS  - Monitor and report changes in neurological status  - Monitor vital signs such as temperature, blood pressure, glucose, and any other labs ordered   - Initiate measures to prevent increased intracranial pressure  - Monitor for seizure activity and implement precautions if appropriate      Outcome: Progressing  Goal: Achieves maximal functionality and self care  Description: INTERVENTIONS  - Monitor swallowing and airway patency with patient fatigue and changes in neurological status  - Encourage and assist patient to increase activity and self care.   - Encourage visually impaired, hearing impaired and aphasic patients to use assistive/communication devices  Outcome: Progressing  Goal: Maintain or return mobility to safest level of function  Description: INTERVENTIONS:  - Assess patient's ability to carry out ADLs; assess patient's baseline for ADL function and identify physical deficits which impact ability to perform ADLs (bathing, care of mouth/teeth, toileting, grooming, dressing, etc.)  - Assess/evaluate cause of self-care deficits   - Assess range of motion  - Assess patient's mobility  - Assess patient's need for assistive devices and  provide as appropriate  - Encourage maximum independence but intervene and supervise when necessary  - Involve family in performance of ADLs  - Assess for home care needs following discharge   - Consider OT consult to assist with ADL evaluation and planning for discharge  - Provide patient education as appropriate  Outcome: Progressing  Goal: Absence of urinary retention  Description: INTERVENTIONS:  - Assess patient’s ability to void and empty bladder  - Monitor I/O  - Bladder scan as needed  - Discuss with physician/AP medications to alleviate retention as needed  - Discuss catheterization for long term situations as appropriate  Outcome: Progressing  Goal: Minimal or absence of nausea and/or vomiting  Description: INTERVENTIONS:  - Administer IV fluids if ordered to ensure adequate hydration  - Maintain NPO status until nausea and vomiting are resolved  - Nasogastric tube if ordered  - Administer ordered antiemetic medications as needed  - Provide nonpharmacologic comfort measures as appropriate  - Advance diet as tolerated, if ordered  - Consider nutrition services referral to assist patient with adequate nutrition and appropriate food choices  Outcome: Progressing  Goal: Maintains adequate nutritional intake  Description: INTERVENTIONS:  - Monitor percentage of each meal consumed  - Identify factors contributing to decreased intake, treat as appropriate  - Assist with meals as needed  - Monitor I&O, weight, and lab values if indicated  - Obtain nutrition services referral as needed  Outcome: Progressing     Problem: DISCHARGE PLANNING - CARE MANAGEMENT  Goal: Discharge to post-acute care or home with appropriate resources  Description: INTERVENTIONS:  - Conduct assessment to determine patient/family and health care team treatment goals, and need for post-acute services based on payer coverage, community resources, and patient preferences, and barriers to discharge  - Address psychosocial, clinical, and  financial barriers to discharge as identified in assessment in conjunction with the patient/family and health care team  - Arrange appropriate level of post-acute services according to patient’s   needs and preference and payer coverage in collaboration with the physician and health care team  - Communicate with and update the patient/family, physician, and health care team regarding progress on the discharge plan  - Arrange appropriate transportation to post-acute venues  Outcome: Progressing

## 2024-04-15 LAB
BASOPHILS # BLD AUTO: 0.04 THOUSANDS/ÂΜL (ref 0–0.1)
BASOPHILS NFR BLD AUTO: 0 % (ref 0–1)
BNP SERPL-MCNC: 14 PG/ML (ref 0–100)
CARDIAC TROPONIN I PNL SERPL HS: <2 NG/L (ref 8–18)
CK SERPL-CCNC: 200 U/L (ref 39–308)
CLOZAPINE SERPL-MCNC: 1363 NG/ML (ref 350–900)
CRP SERPL QL: 8.2 MG/L
EOSINOPHIL # BLD AUTO: 0.23 THOUSAND/ÂΜL (ref 0–0.61)
EOSINOPHIL NFR BLD AUTO: 2 % (ref 0–6)
ERYTHROCYTE [DISTWIDTH] IN BLOOD BY AUTOMATED COUNT: 13.7 % (ref 11.6–15.1)
HCT VFR BLD AUTO: 40.7 % (ref 36.5–49.3)
HGB BLD-MCNC: 12.9 G/DL (ref 12–17)
IMM GRANULOCYTES # BLD AUTO: 0.05 THOUSAND/UL (ref 0–0.2)
IMM GRANULOCYTES NFR BLD AUTO: 1 % (ref 0–2)
LYMPHOCYTES # BLD AUTO: 3.57 THOUSANDS/ÂΜL (ref 0.6–4.47)
LYMPHOCYTES NFR BLD AUTO: 36 % (ref 14–44)
MCH RBC QN AUTO: 24.2 PG (ref 26.8–34.3)
MCHC RBC AUTO-ENTMCNC: 31.7 G/DL (ref 31.4–37.4)
MCV RBC AUTO: 76 FL (ref 82–98)
MONOCYTES # BLD AUTO: 0.93 THOUSAND/ÂΜL (ref 0.17–1.22)
MONOCYTES NFR BLD AUTO: 9 % (ref 4–12)
NEUTROPHILS # BLD AUTO: 5.15 THOUSANDS/ÂΜL (ref 1.85–7.62)
NEUTS SEG NFR BLD AUTO: 52 % (ref 43–75)
NRBC BLD AUTO-RTO: 0 /100 WBCS
PLATELET # BLD AUTO: 232 THOUSANDS/UL (ref 149–390)
PMV BLD AUTO: 11.9 FL (ref 8.9–12.7)
RBC # BLD AUTO: 5.33 MILLION/UL (ref 3.88–5.62)
WBC # BLD AUTO: 9.97 THOUSAND/UL (ref 4.31–10.16)

## 2024-04-15 PROCEDURE — 85025 COMPLETE CBC W/AUTO DIFF WBC: CPT | Performed by: PSYCHIATRY & NEUROLOGY

## 2024-04-15 PROCEDURE — 84484 ASSAY OF TROPONIN QUANT: CPT | Performed by: PSYCHIATRY & NEUROLOGY

## 2024-04-15 PROCEDURE — 99232 SBSQ HOSP IP/OBS MODERATE 35: CPT | Performed by: PSYCHIATRY & NEUROLOGY

## 2024-04-15 PROCEDURE — 80159 DRUG ASSAY CLOZAPINE: CPT | Performed by: PSYCHIATRY & NEUROLOGY

## 2024-04-15 PROCEDURE — 83880 ASSAY OF NATRIURETIC PEPTIDE: CPT | Performed by: PSYCHIATRY & NEUROLOGY

## 2024-04-15 PROCEDURE — 82550 ASSAY OF CK (CPK): CPT | Performed by: PSYCHIATRY & NEUROLOGY

## 2024-04-15 PROCEDURE — 86140 C-REACTIVE PROTEIN: CPT | Performed by: PSYCHIATRY & NEUROLOGY

## 2024-04-15 RX ADMIN — NICOTINE POLACRILEX 2 MG: 2 GUM, CHEWING BUCCAL at 12:57

## 2024-04-15 RX ADMIN — RISPERIDONE 1 MG: 0.25 TABLET, FILM COATED ORAL at 21:20

## 2024-04-15 RX ADMIN — CLOZAPINE 450 MG: 25 TABLET ORAL at 21:20

## 2024-04-15 RX ADMIN — MINOCYCLINE HYDROCHLORIDE 100 MG: 100 CAPSULE ORAL at 08:16

## 2024-04-15 RX ADMIN — CYANOCOBALAMIN TAB 1000 MCG 1000 MCG: 1000 TAB at 08:16

## 2024-04-15 RX ADMIN — PROPRANOLOL HYDROCHLORIDE 10 MG: 10 TABLET ORAL at 21:21

## 2024-04-15 RX ADMIN — SENNOSIDES AND DOCUSATE SODIUM 2 TABLET: 8.6; 5 TABLET ORAL at 08:18

## 2024-04-15 RX ADMIN — NICOTINE POLACRILEX 2 MG: 2 GUM, CHEWING BUCCAL at 15:27

## 2024-04-15 RX ADMIN — SENNOSIDES AND DOCUSATE SODIUM 2 TABLET: 8.6; 5 TABLET ORAL at 17:28

## 2024-04-15 RX ADMIN — ATROPINE SULFATE 1 DROP: 10 SOLUTION/ DROPS OPHTHALMIC at 21:19

## 2024-04-15 RX ADMIN — NICOTINE POLACRILEX 2 MG: 2 GUM, CHEWING BUCCAL at 21:20

## 2024-04-15 RX ADMIN — NICOTINE POLACRILEX 2 MG: 2 GUM, CHEWING BUCCAL at 17:28

## 2024-04-15 RX ADMIN — METFORMIN HYDROCHLORIDE 500 MG: 500 TABLET ORAL at 08:16

## 2024-04-15 RX ADMIN — MINOCYCLINE HYDROCHLORIDE 100 MG: 100 CAPSULE ORAL at 21:20

## 2024-04-15 RX ADMIN — MELATONIN TAB 3 MG 3 MG: 3 TAB at 21:20

## 2024-04-15 RX ADMIN — NICOTINE POLACRILEX 2 MG: 2 GUM, CHEWING BUCCAL at 08:43

## 2024-04-15 RX ADMIN — ESCITALOPRAM OXALATE 20 MG: 10 TABLET, FILM COATED ORAL at 08:17

## 2024-04-15 RX ADMIN — POLYETHYLENE GLYCOL 3350 17 G: 17 POWDER, FOR SOLUTION ORAL at 08:18

## 2024-04-15 NOTE — NURSING NOTE
Pt visible on unit. Pt continues to report AH that threaten to kill him, depression and anxiety; Denies SI/HI/VH. Pt refused VS this morning, scheduled Norvasc and Inderal held. Compliant with all other medications and meals. Utilizes nicorette gum as ordered. No behavioral issues. Will maintain q7 min checks.

## 2024-04-15 NOTE — PROGRESS NOTES
04/15/24 0737   Team Meeting   Meeting Type Daily Rounds   Team Members Present   Team Members Present Physician;Nurse;;Other (Discipline and Name)   Patient/Family Present   Patient Present No   Patient's Family Present No     In attendance:  MD Eveline Gamino, HOLLI Lucas, RN  Luisana Alvarado, [facility]  Carla Lux, W  MATILDA Daniel.S.    Groups: 3/9    Pt is quiet, polite and pleasant. Pt had ECT Friday. Pt is cooperative with no behavioral issues and reports ongoing auditory hallucinations.

## 2024-04-15 NOTE — PLAN OF CARE
Problem: Alteration in Thoughts and Perception  Goal: Treatment Goal: Gain control of psychotic behaviors/thinking, reduce/eliminate presenting symptoms and demonstrate improved reality functioning upon discharge  Outcome: Progressing  Goal: Verbalize thoughts and feelings  Description: Interventions:  - Promote a nonjudgmental and trusting relationship with the patient through active listening and therapeutic communication  - Assess patient's level of functioning, behavior and potential for risk  - Engage patient in 1 on 1 interactions  - Encourage patient to express fears, feelings, frustrations, and discuss symptoms    - [place] patient to reality, help patient recognize reality-based thinking   - Administer medications as ordered and assess for potential side effects  - Provide the patient education related to the signs and symptoms of the illness and desired effects of prescribed medications  Outcome: Progressing  Goal: Refrain from acting on delusional thinking/internal stimuli  Description: Interventions:  - Monitor patient closely, per order   - Utilize least restrictive measures   - Set reasonable limits, give positive feedback for acceptable   - Administer medications as ordered and monitor of potential side effects  Outcome: Progressing  Goal: Agree to be compliant with medication regime, as prescribed and report medication side effects  Description: Interventions:  - Offer appropriate PRN medication and supervise ingestion; conduct AIMS, as needed   Outcome: Progressing  Goal: Attend and participate in unit activities, including therapeutic, recreational, and educational groups  Description: Interventions:  -Encourage Visitation and family involvement in care  Outcome: Progressing  Goal: Recognize dysfunctional thoughts, communicate reality-based thoughts at the time of discharge  Description: Interventions:  - Provide medication and psycho-education to assist patient in compliance and developing  insight into his/her illness   Outcome: Progressing  Goal: Complete daily ADLs, including personal hygiene independently, as able  Description: Interventions:  - Observe, teach, and assist patient with ADLS  - Monitor and promote a balance of rest/activity, with adequate nutrition and elimination   Outcome: Progressing     Problem: Ineffective Coping  Goal: Identifies ineffective coping skills  Outcome: Progressing  Goal: Identifies healthy coping skills  Outcome: Progressing  Goal: Demonstrates healthy coping skills  Outcome: Progressing  Goal: Participates in unit activities  Description: Interventions:  - Provide therapeutic environment   - Provide required programming   - Redirect inappropriate behaviors   Outcome: Progressing  Goal: Patient/Family participate in treatment and DC plans  Description: Interventions:  - Provide therapeutic environment  Outcome: Progressing  Goal: Patient/Family verbalizes awareness of resources  Outcome: Progressing     Problem: Depression  Goal: Treatment Goal: Demonstrate behavioral control of depressive symptoms, verbalize feelings of improved mood/affect, and adopt new coping skills prior to discharge  Outcome: Progressing  Goal: Verbalize thoughts and feelings  Description: Interventions:  - Assess and re-assess patient's level of risk   - Engage patient in 1:1 interactions, daily, for a minimum of 15 minutes   - Encourage patient to express feelings, fears, frustrations, hopes   Outcome: Progressing  Goal: Refrain from harming self  Description: Interventions:  - Monitor patient closely, per order   - Supervise medication ingestion, monitor effects and side effects   Outcome: Progressing  Goal: Refrain from isolation  Description: Interventions:  - Develop a trusting relationship   - Encourage socialization   Outcome: Progressing  Goal: Refrain from self-neglect  Outcome: Progressing  Goal: Attend and participate in unit activities, including therapeutic, recreational, and  educational groups  Description: Interventions:  - Provide therapeutic and educational activities daily, encourage attendance and participation, and document same in the medical record   Outcome: Progressing  Goal: Complete daily ADLs, including personal hygiene independently, as able  Description: Interventions:  - Observe, teach, and assist patient with ADLS  -  Monitor and promote a balance of rest/activity, with adequate nutrition and elimination   Outcome: Progressing     Problem: Anxiety  Goal: Anxiety is at manageable level  Description: Interventions:  - Assess and monitor patient's anxiety level.   - Monitor for signs and symptoms (heart palpitations, chest pain, shortness of breath, headaches, nausea, feeling jumpy, restlessness, irritable, apprehensive).   - Collaborate with interdisciplinary team and initiate plan and interventions as ordered.  - [place] patient to unit/surroundings  - Explain treatment plan  - Encourage participation in care  - Encourage verbalization of concerns/fears  - Identify coping mechanisms  - Assist in developing anxiety-reducing skills  - Administer/offer alternative therapies  - Limit or eliminate stimulants  Outcome: Progressing     Problem: Alteration in Orientation  Goal: Treatment Goal: Demonstrate a reduction of confusion and improved orientation to person, place, time and/or situation upon discharge, according to optimum baseline/ability  Outcome: Progressing  Goal: Interact with staff daily  Description: Interventions:  - Assess and re-assess patient's level of orientation  - Engage patient in 1 on 1 interactions, daily, for a minimum of 15 minutes   - Establish rapport/trust with patient   Outcome: Progressing  Goal: Express concerns related to confused thinking related to:  Description: Interventions:  - Encourage patient to express feelings, fears, frustrations, hopes  - Assign consistent caregivers   - [place]/re-orient patient as needed  - Allow comfort items, as  appropriate  - Provide visual cues, signs, etc.   Outcome: Progressing  Goal: Allow medical examinations, as recommended  Description: Interventions:  - Provide physical/neurological exams and/or referrals, per provider   Outcome: Progressing  Goal: Cooperate with recommended testing/procedures  Description: Interventions:  - Determine need for ancillary testing  - Observe for mental status changes  - Implement falls/precaution protocol   Outcome: Progressing  Goal: Attend and participate in unit activities, including therapeutic, recreational, and educational groups  Description: Interventions:  - Provide therapeutic and educational activities daily, encourage attendance and participation, and document same in the medical record   - Provide appropriate opportunities for reminiscence   - Provide a consistent daily routine   - Encourage family contact/visitation   Outcome: Progressing  Goal: Complete daily ADLs, including personal hygiene independently, as able  Description: Interventions:  - Observe, teach, and assist patient with ADLS  Outcome: Progressing     Problem: Individualized Interventions  Goal: Patient will verbalize appropriate use of telephone within 5 days  Description: Interventions:  - Treatment team to determine use of supervised phone privileges   Outcome: Progressing  Goal: Patient will verbalize need for hospitalization and will no longer attempt elopement within 5 days  Description: Interventions:  - Ongoing education to help patient understand need for hospitalization  Outcome: Progressing  Goal: Patient will recognize inappropriate behaviors and develop alternative behaviors within 5 days  Description: Interventions:  - Patient in collaboration with Treatment Team will develop a behavior management plan to help identify effective coping skills to deal with stressors  Outcome: Progressing     Problem: Electroconvulsive therapy (ECT)  Goal: Treatment Goal: Demonstrate a reduction of confusion  and improved orientation to person, place, time and/or situation upon discharge, according to optimum baseline/ability  Outcome: Progressing  Goal: Verbalizes/displays adequate comfort level or baseline comfort level  Description: Interventions:  - Encourage patient to monitor pain and request assistance  - Assess pain using appropriate pain scale  - Administer analgesics based on type and severity of pain and evaluate response  - Implement non-pharmacological measures as appropriate and evaluate response  - Consider cultural and social influences on pain and pain management  - Notify physician/advanced practitioner if interventions unsuccessful or patient reports new pain  Outcome: Progressing  Goal: Achieves stable or improved neurological status  Description: INTERVENTIONS  - Monitor and report changes in neurological status  - Monitor vital signs such as temperature, blood pressure, glucose, and any other labs ordered   - Initiate measures to prevent increased intracranial pressure  - Monitor for seizure activity and implement precautions if appropriate      Outcome: Progressing  Goal: Achieves maximal functionality and self care  Description: INTERVENTIONS  - Monitor swallowing and airway patency with patient fatigue and changes in neurological status  - Encourage and assist patient to increase activity and self care.   - Encourage visually impaired, hearing impaired and aphasic patients to use assistive/communication devices  Outcome: Progressing  Goal: Maintain or return mobility to safest level of function  Description: INTERVENTIONS:  - Assess patient's ability to carry out ADLs; assess patient's baseline for ADL function and identify physical deficits which impact ability to perform ADLs (bathing, care of mouth/teeth, toileting, grooming, dressing, etc.)  - Assess/evaluate cause of self-care deficits   - Assess range of motion  - Assess patient's mobility  - Assess patient's need for assistive devices and  provide as appropriate  - Encourage maximum independence but intervene and supervise when necessary  - Involve family in performance of ADLs  - Assess for home care needs following discharge   - Consider OT consult to assist with ADL evaluation and planning for discharge  - Provide patient education as appropriate  Outcome: Progressing  Goal: Absence of urinary retention  Description: INTERVENTIONS:  - Assess patient’s ability to void and empty bladder  - Monitor I/O  - Bladder scan as needed  - Discuss with physician/AP medications to alleviate retention as needed  - Discuss catheterization for long term situations as appropriate  Outcome: Progressing  Goal: Minimal or absence of nausea and/or vomiting  Description: INTERVENTIONS:  - Administer IV fluids if ordered to ensure adequate hydration  - Maintain NPO status until nausea and vomiting are resolved  - Nasogastric tube if ordered  - Administer ordered antiemetic medications as needed  - Provide nonpharmacologic comfort measures as appropriate  - Advance diet as tolerated, if ordered  - Consider nutrition services referral to assist patient with adequate nutrition and appropriate food choices  Outcome: Progressing  Goal: Maintains adequate nutritional intake  Description: INTERVENTIONS:  - Monitor percentage of each meal consumed  - Identify factors contributing to decreased intake, treat as appropriate  - Assist with meals as needed  - Monitor I&O, weight, and lab values if indicated  - Obtain nutrition services referral as needed  Outcome: Progressing     Problem: DISCHARGE PLANNING - CARE MANAGEMENT  Goal: Discharge to post-acute care or home with appropriate resources  Description: INTERVENTIONS:  - Conduct assessment to determine patient/family and health care team treatment goals, and need for post-acute services based on payer coverage, community resources, and patient preferences, and barriers to discharge  - Address psychosocial, clinical, and  financial barriers to discharge as identified in assessment in conjunction with the patient/family and health care team  - Arrange appropriate level of post-acute services according to patient’s   needs and preference and payer coverage in collaboration with the physician and health care team  - Communicate with and update the patient/family, physician, and health care team regarding progress on the discharge plan  - Arrange appropriate transportation to post-acute venues  Outcome: Progressing

## 2024-04-15 NOTE — PROGRESS NOTES
Psychiatry Progress Note [facility]    Alberto Berumen 28 y.o. male MRN: [id number]  Unit/Bed#: [facility] 107-01 Encounter: [id number]  Code Status: Level 1 - Full Code    PCP: Joce Juan MD    Date of Admission:  3/29/2024 2008   Date of Service:  04/15/24    Patient Active Problem List   Diagnosis    GERD (gastroesophageal reflux disease)    Medical clearance for psychiatric admission    Schizoaffective disorder, bipolar type (HCC)    Tobacco abuse    T wave inversion in EKG    Syringoma    Chronic idiopathic constipation    Vitamin B 12 deficiency    Vitamin D deficiency    Confluent and reticulate papillomatosis    Class 2 obesity in adult    Primary hypertension    Elevated hemoglobin A1c     Review of systems: Unremarkable   diagnosis: Schizoaffective bipolar    Assessment  Overall Status: Continues to report same threatening voices that tell him that he and his family will be killed.  This has not changed much but feels good being on ECTs claiming that she has helped him being less in his head.  Minimal interaction with peers attending only few groups staying to himself usually found laying in bed in his room    certification Statement: The patient will continue to require additional inpatient hospital stay due to ongoing voices that are threatening to kill him and his family lack of response to medications and ECT so far.     Medications: Clozapine 450 mg at bedtime, propranolol 10 mg every 12 hours as as needed for anxiety, Risperdal 1 mg at bedtime Lexapro 20 mg once a day, atropine eyedrops sublingual for drooling of saliva and senna 2 tablets twice a day for constipation  All medications reviewed and recommend they be continued for symptom management side effects from treatment: None reported  Medication changes    Risperdal will be increased as 2 mg as an adjunct with the clozapine on board for treatment resistant schizophrenia   Medication education   Risks side effects benefits  and precautions of medications discussed with patient and he did verbalize an understanding about risks for metabolic syndrome from being on neuroleptics and is form tardive dyskinesia etc. and special precautions about being on clozapine   Understanding of medications: Has some understanding   Justification for dual anti-psychotics: Not applicable    Non-pharmacological treatments  Continue with individual, group, milieu and occupational therapy using recovery principles and psycho-education about accepting illness and the need for treatment.   to contact aunt and explore ACT team referral which he never had  ECT to be continued  weekly for maintenance starting on 4/12/2024    Safety  Safety and communication plan established to target dynamic risk factors discussed above.    Discharge Plan   Back to his aunt with an ACT team    Interval Progress   Continues to lay in bed most of the time appearing preoccupied but clean shaven, needing reminders to change and take showers.  Socially isolated with minimal interaction with peers attending only few groups.  Still reporting same voices that started and apparently him and his family but he believes ECT has definitely helped in decreasing the frequency and is happy for the maintenance ECTs weekly.  Not aggressive or agitated or threatening or demanding or self-abusive with no need for behavioral PRNs.  Tolerating the addition of risperidone.  In touch with his aunt and sister. He is actually asking for more ECTs!   Acceptance by patient: Accepting  Hopefulness in recovery: Living with his aunt again  Involved in reintegration process: Talking with his sister and aunt  Trusting in relationship with psychiatrist: Trusting  Sleep: Good  Appetite: Good  Compliance with Medications: Compliant  Group attendance: Only a few 3/9  Significant events: No significant changes    Mental Status Exam  Appearance: age appropriate, improved grooming, looks older than stated  age, overweight, with hair and dreadlocks casually dressed with a clean shaven face, fairly groomed with good eye contact  did attend team meeting today   behavior: cooperative, mildly anxious, evasive, gesturing, slow responses appearing internally preoccupied and attuned   Speech: normal rate, normal volume, normal pitch  Mood: dysphoric, anxious, reports he feels good  with no good mood reactivity   Affect: constricted, inappropriate, mood-congruent  Thought Process: organized, logical, coherent, goal directed, linear, decreased rate of thoughts, slowing of thoughts, negative thinking, impaired abstract reasoning, concrete  Thought Content: paranoid ideation, some paranoia, grandiose ideas, intrusive thoughts, preoccupied, chronic,   denies any current s/h thoughts intent or plans, still paranoid about people threatening to kill him and his family. No other delusions elicited  today . No phobias or compulsions or distorted body perceptions   perceptual Disturbances: auditory hallucinations, appears preoccupied, appears responding to internal stimuli, not observed, reports voices tell him that he and his family are going to be killed sometimes from 1 person on more than one person talking to him most of the day claiming he has not seen much difference other than a little bit subdued with the ECT.  .  Hx Risk Factors: chronic psychiatric problems, chronic anxiety symptoms, chronic psychotic symptoms, his aunt possibly moving away  Sensorium:alert and oriented x 3 spheres and to situation   cognition: recent and remote memory grossly intact  Consciousness: alert and awake  Attention: attention span and concentration are age appropriate  Intellect: appears to be of average intelligence  Insight: intact  Judgement: intact  Motor Activity: no abnormal movements     Vitals  Temp:  [98 °F (36.7 °C)] 98 °F (36.7 °C)  HR:  [] 100  Resp:  [18] 18  BP: (136)/(66-92) 136/92  SpO2:  [99 %] 99 %  No intake or output data  in the 24 hours ending 04/15/24 0500          Lab Results: All Labs For Current Hospital Admission Reviewed     Current Facility-Administered Medications   Medication Dose Route Frequency Provider Last Rate    acetaminophen  650 mg Oral Q4H PRN Jordan C Holter, DO      acetaminophen  650 mg Oral Q6H PRN HOLLI Lion      aluminum-magnesium hydroxide-simethicone  30 mL Oral Q4H PRN Jordan C Holter, DO      amLODIPine  5 mg Oral Daily HOLLI Lion      Artificial Tears  1 drop Both Eyes Q3H PRN Jordan C Holter, DO      atropine  1 drop Sublingual HS HOLLI Lion      atropine  1 drop Sublingual Daily PRN HOLLI Lion      haloperidol lactate  2.5 mg Intramuscular Q4H PRN Max 4/day HOLLI Lion      And    LORazepam  1 mg Intramuscular Q4H PRN Max 4/day HOLLI Lion      And    benztropine  0.5 mg Intramuscular Q4H PRN Max 4/day HOLLI Lion      benztropine  1 mg Intramuscular Q4H PRN Max 6/day Jordan C Holter, DO      haloperidol lactate  5 mg Intramuscular Q4H PRN Max 4/day HOLLI Lion      And    LORazepam  2 mg Intramuscular Q4H PRN Max 4/day HOLLI Lion      And    benztropine  1 mg Intramuscular Q4H PRN Max 4/day HOLLI Lion      benztropine  1 mg Oral Q4H PRN Max 6/day HOLLI Lion      benztropine  1 mg Oral Q4H PRN Max 6/day Jordan C Holter, DO      bisacodyl  10 mg Rectal Daily PRN HOLLI Lion      cloZAPine  450 mg Oral HS HOLLI Lion      cyanocobalamin  1,000 mcg Oral Daily HOLLI Galvan      hydrOXYzine HCL  50 mg Oral Q6H PRN Max 4/day Jordan C Holter, DO      Or    diphenhydrAMINE  50 mg Intramuscular Q6H PRN Jordan C Holter, DO      hydrOXYzine HCL  50 mg Oral Q6H PRN Max 4/day HOLLI Lion      Or    diphenhydrAMINE  50 mg Intramuscular Q6H PRN HOLLI Lion      diphenhydrAMINE-zinc acetate   Topical BID PRN HOLLI Lion      ergocalciferol  50,000 Units Oral Weekly Pia Mantilla,  HOLLI      escitalopram  20 mg Oral Daily HOLLI Lion      haloperidol  1 mg Oral Q6H PRN Eveline HOLLI Hunt      haloperidol  2.5 mg Oral Q4H PRN Max 4/day Eveline HOLLI Hunt      haloperidol  5 mg Oral Q4H PRN Max 4/day Eveline HOLLI Hunt      hydrocortisone   Topical 4x Daily PRN HOLLI Lion      hydrOXYzine HCL  100 mg Oral Q6H PRN Max 4/day [facility] Holter, DO      Or    LORazepam  2 mg Intramuscular Q6H PRN [facility] Holter, DO      hydrOXYzine HCL  100 mg Oral Q6H PRN Max 4/day HOLLI Lion      Or    LORazepam  2 mg Intramuscular Q6H PRN HOLLI Lion      hydrOXYzine HCL  25 mg Oral Q6H PRN Max 4/day [facility] Holter, DO      ibuprofen  600 mg Oral Q8H PRN HOLLI Lion      melatonin  3 mg Oral HS [facility] Holter, DO      metFORMIN  500 mg Oral Daily With Breakfast HOLLI Lion      methocarbamol  500 mg Oral Q6H PRN Eveline HOLLI Hunt      minocycline  100 mg Oral Q12H KAYLIE EvelineHOLLI Christy      nicotine polacrilex  2 mg Oral Q2H PRN [facility] Holter, DO      OLANZapine  5 mg Oral Q4H PRN Max 3/day [facility] Holter, DO      Or    OLANZapine  2.5 mg Intramuscular Q4H PRN Max 3/day [facility] Holter, DO      OLANZapine  5 mg Oral Q3H PRN Max 3/day [facility] Holter, DO      Or    OLANZapine  5 mg Intramuscular Q3H PRN Max 3/day [facility] Holter, DO      OLANZapine  2.5 mg Oral Q4H PRN Max 6/day [facility] Holter, DO      ondansetron  4 mg Oral Q6H PRN HOLLI Lion      polyethylene glycol  17 g Oral Daily HOLLI Lion      polyethylene glycol  17 g Oral Daily PRN [facility] Holter, DO      propranolol  10 mg Oral Q12H KAYLIE HOLLI Lion      risperiDONE  1 mg Oral HS Bora Rosario MD      senna-docusate sodium  1 tablet Oral Daily PRN [facility] Holter, DO      senna-docusate sodium  2 tablet Oral BID HOLLI Lion      traZODone  50 mg Oral HS PRN HOLLI Lion      white petrolatum-mineral oil   Topical TID PRN HOLLI Lion         Counseling /  Coordination of Care: Total floor / unit time spent today 15 minutes. Greater than 50% of total time was spent with the patient and / or family counseling and / or somewhat receptive to supportive listening and teaching positive coping skills to deal with symptom mangement.     Patient's Rights, confidentiality and exceptions to confidentiality, use of automated medical record, Behavioral Health Services staff access to medical record, and consent to treatment reviewed.    This note has been dictated and hence there may be problems with punctuation, spelling and formatting and if anyone has any concerns please address them to Dr. Rosario   This note is not shared with patient due to potential for making patient's condition worse by knowing the content of the note.

## 2024-04-15 NOTE — PROGRESS NOTES
04/15/24 1013   Team Meeting   Meeting Type Tx Team Meeting   Initial Conference Date 04/15/24   Next Conference Date 04/29/24   Team Members Present   Team Members Present Physician;Nurse;;Other (Discipline and Name)   Physician Team Member Abraham   Nursing Team Member Lance   Social Work Team Member Jenny ORTEGA   Other (Discipline and Name) Zhou CHARLTONDS; Zulma PATEL   Patient/Family Present   Patient Present Yes   Patient's Family Present No   OTHER   Team Meeting - Additional Comments Pt attended his treatment team meeting. Pt read through his completed self-assessment. Pt has been tired and sleeping often but attending some groups. Pt expressed wanting to continue ECT treatments.

## 2024-04-15 NOTE — NURSING NOTE
Patient was cooperative and pleasant this shift.  Patient requesting Nicorette gum every 2 hours.  Medication compliant. Behaviors controlled and appropriate. Offered no complaints. No PRN medications requested or required

## 2024-04-16 PROCEDURE — 99232 SBSQ HOSP IP/OBS MODERATE 35: CPT | Performed by: PSYCHIATRY & NEUROLOGY

## 2024-04-16 RX ORDER — RISPERIDONE 2 MG/1
2 TABLET ORAL
Status: DISCONTINUED | OUTPATIENT
Start: 2024-04-16 | End: 2024-05-30

## 2024-04-16 RX ADMIN — SENNOSIDES AND DOCUSATE SODIUM 2 TABLET: 8.6; 5 TABLET ORAL at 08:48

## 2024-04-16 RX ADMIN — NICOTINE POLACRILEX 2 MG: 2 GUM, CHEWING BUCCAL at 12:52

## 2024-04-16 RX ADMIN — SENNOSIDES AND DOCUSATE SODIUM 2 TABLET: 8.6; 5 TABLET ORAL at 17:23

## 2024-04-16 RX ADMIN — RISPERIDONE 2 MG: 2 TABLET, FILM COATED ORAL at 21:11

## 2024-04-16 RX ADMIN — MINOCYCLINE HYDROCHLORIDE 100 MG: 100 CAPSULE ORAL at 21:10

## 2024-04-16 RX ADMIN — ERGOCALCIFEROL 50000 UNITS: 1.25 CAPSULE, LIQUID FILLED ORAL at 08:48

## 2024-04-16 RX ADMIN — CYANOCOBALAMIN TAB 1000 MCG 1000 MCG: 1000 TAB at 08:49

## 2024-04-16 RX ADMIN — PROPRANOLOL HYDROCHLORIDE 10 MG: 10 TABLET ORAL at 21:10

## 2024-04-16 RX ADMIN — POLYETHYLENE GLYCOL 3350 17 G: 17 POWDER, FOR SOLUTION ORAL at 08:47

## 2024-04-16 RX ADMIN — MINOCYCLINE HYDROCHLORIDE 100 MG: 100 CAPSULE ORAL at 08:47

## 2024-04-16 RX ADMIN — ATROPINE SULFATE 1 DROP: 10 SOLUTION/ DROPS OPHTHALMIC at 21:15

## 2024-04-16 RX ADMIN — NICOTINE POLACRILEX 2 MG: 2 GUM, CHEWING BUCCAL at 15:31

## 2024-04-16 RX ADMIN — NICOTINE POLACRILEX 2 MG: 2 GUM, CHEWING BUCCAL at 20:05

## 2024-04-16 RX ADMIN — NICOTINE POLACRILEX 2 MG: 2 GUM, CHEWING BUCCAL at 17:37

## 2024-04-16 RX ADMIN — NICOTINE POLACRILEX 2 MG: 2 GUM, CHEWING BUCCAL at 10:50

## 2024-04-16 RX ADMIN — MELATONIN TAB 3 MG 3 MG: 3 TAB at 21:11

## 2024-04-16 RX ADMIN — ESCITALOPRAM OXALATE 20 MG: 10 TABLET, FILM COATED ORAL at 08:48

## 2024-04-16 RX ADMIN — NICOTINE POLACRILEX 2 MG: 2 GUM, CHEWING BUCCAL at 08:57

## 2024-04-16 RX ADMIN — CLOZAPINE 450 MG: 25 TABLET ORAL at 21:11

## 2024-04-16 RX ADMIN — METFORMIN HYDROCHLORIDE 500 MG: 500 TABLET ORAL at 07:47

## 2024-04-16 NOTE — NURSING NOTE
Pt isolative to self and room for most of shift. Pt became visible in milieu after lunch and attended groups. Pt visibly responding to internal stimuli but remains pleasant in conversation. Pt denies SI and HI. Pt is cooperative with care and medication compliant. Q 7 minute safety checks maintained.

## 2024-04-16 NOTE — NURSING NOTE
Patient visible more on unit and less isolative. More talkative. Pleasant and cooperative. Smiles upon approach. Continues to have auditory hallucinations telling him they are going to harm him and his family. Support offered. Denies suicidal/homicidal ideations. Offers no other complaints. Medication compliant. Appetite good. Social with select peers.

## 2024-04-16 NOTE — PROGRESS NOTES
04/16/24 0741   Team Meeting   Meeting Type Daily Rounds   Team Members Present   Team Members Present Physician;Nurse;;Other (Discipline and Name)   Patient/Family Present   Patient Present No   Patient's Family Present No     In attendance:  MD Eveline Gamino, HOLLI Lucas, MIGUEL Alvarado, [facility]  Carla Lux, W  MATILDA Daniel.S.    Groups: 4/7    Pt has been more visible and engaged on the unit. Pt refused vitals yesterday morning. Pt is overall pleasant and cooperative. Pt reports ongoing auditory hallucinations threatening him and his family.

## 2024-04-16 NOTE — NURSING NOTE
It was reported by staff last evening  that this patient is a difficult stick to obtain lab work. This RN was  not able to draw his labwork until 2200 last evening which included a clozaril level and after receiving a dose of Clozaril at 2100. The Clozaril level is 1363 which will be reported to the treatment team and Psychiatrist this am. Upon the Dr.s discretion the Clozaril level may have to be repeated this evening prior to his bedtime dose since the Clozaril level was drawn last evening an hour after he received a dose of Clozaril.

## 2024-04-16 NOTE — PLAN OF CARE
Problem: Depression  Goal: Refrain from harming self  Description: Interventions:  - Monitor patient closely, per order   - Supervise medication ingestion, monitor effects and side effects   Outcome: Progressing     Problem: Alteration in Orientation  Goal: Allow medical examinations, as recommended  Description: Interventions:  - Provide physical/neurological exams and/or referrals, per provider   Outcome: Progressing  Goal: Cooperate with recommended testing/procedures  Description: Interventions:  - Determine need for ancillary testing  - Observe for mental status changes  - Implement falls/precaution protocol   Outcome: Progressing     Problem: Electroconvulsive therapy (ECT)  Goal: Absence of urinary retention  Description: INTERVENTIONS:  - Assess patient’s ability to void and empty bladder  - Monitor I/O  - Bladder scan as needed  - Discuss with physician/AP medications to alleviate retention as needed  - Discuss catheterization for long term situations as appropriate  Outcome: Progressing  Goal: Minimal or absence of nausea and/or vomiting  Description: INTERVENTIONS:  - Administer IV fluids if ordered to ensure adequate hydration  - Maintain NPO status until nausea and vomiting are resolved  - Nasogastric tube if ordered  - Administer ordered antiemetic medications as needed  - Provide nonpharmacologic comfort measures as appropriate  - Advance diet as tolerated, if ordered  - Consider nutrition services referral to assist patient with adequate nutrition and appropriate food choices  Outcome: Progressing  Goal: Maintains adequate nutritional intake  Description: INTERVENTIONS:  - Monitor percentage of each meal consumed  - Identify factors contributing to decreased intake, treat as appropriate  - Assist with meals as needed  - Monitor I&O, weight, and lab values if indicated  - Obtain nutrition services referral as needed  Outcome: Progressing     Problem: Alteration in Thoughts and Perception  Goal:  Verbalize thoughts and feelings  Description: Interventions:  - Promote a nonjudgmental and trusting relationship with the patient through active listening and therapeutic communication  - Assess patient's level of functioning, behavior and potential for risk  - Engage patient in 1 on 1 interactions  - Encourage patient to express fears, feelings, frustrations, and discuss symptoms    - [place] patient to reality, help patient recognize reality-based thinking   - Administer medications as ordered and assess for potential side effects  - Provide the patient education related to the signs and symptoms of the illness and desired effects of prescribed medications  Outcome: Not Progressing  Goal: Refrain from acting on delusional thinking/internal stimuli  Description: Interventions:  - Monitor patient closely, per order   - Utilize least restrictive measures   - Set reasonable limits, give positive feedback for acceptable   - Administer medications as ordered and monitor of potential side effects  Outcome: Not Progressing  Goal: Agree to be compliant with medication regime, as prescribed and report medication side effects  Description: Interventions:  - Offer appropriate PRN medication and supervise ingestion; conduct AIMS, as needed   Outcome: Not Progressing  Goal: Attend and participate in unit activities, including therapeutic, recreational, and educational groups  Description: Interventions:  -Encourage Visitation and family involvement in care  Outcome: Not Progressing  Goal: Recognize dysfunctional thoughts, communicate reality-based thoughts at the time of discharge  Description: Interventions:  - Provide medication and psycho-education to assist patient in compliance and developing insight into his/her illness   Outcome: Not Progressing  Goal: Complete daily ADLs, including personal hygiene independently, as able  Description: Interventions:  - Observe, teach, and assist patient with ADLS  - Monitor and promote  a balance of rest/activity, with adequate nutrition and elimination   Outcome: Not Progressing     Problem: Ineffective Coping  Goal: Identifies ineffective coping skills  Outcome: Not Progressing  Goal: Identifies healthy coping skills  Outcome: Not Progressing  Goal: Demonstrates healthy coping skills  Outcome: Not Progressing  Goal: Participates in unit activities  Description: Interventions:  - Provide therapeutic environment   - Provide required programming   - Redirect inappropriate behaviors   Outcome: Not Progressing  Goal: Patient/Family participate in treatment and DC plans  Description: Interventions:  - Provide therapeutic environment  Outcome: Not Progressing  Goal: Patient/Family verbalizes awareness of resources  Outcome: Not Progressing     Problem: Depression  Goal: Verbalize thoughts and feelings  Description: Interventions:  - Assess and re-assess patient's level of risk   - Engage patient in 1:1 interactions, daily, for a minimum of 15 minutes   - Encourage patient to express feelings, fears, frustrations, hopes   Outcome: Not Progressing  Goal: Refrain from isolation  Description: Interventions:  - Develop a trusting relationship   - Encourage socialization   Outcome: Not Progressing  Goal: Attend and participate in unit activities, including therapeutic, recreational, and educational groups  Description: Interventions:  - Provide therapeutic and educational activities daily, encourage attendance and participation, and document same in the medical record   Outcome: Not Progressing  Goal: Complete daily ADLs, including personal hygiene independently, as able  Description: Interventions:  - Observe, teach, and assist patient with ADLS  -  Monitor and promote a balance of rest/activity, with adequate nutrition and elimination   Outcome: Not Progressing     Problem: Anxiety  Goal: Anxiety is at manageable level  Description: Interventions:  - Assess and monitor patient's anxiety level.   - Monitor  for signs and symptoms (heart palpitations, chest pain, shortness of breath, headaches, nausea, feeling jumpy, restlessness, irritable, apprehensive).   - Collaborate with interdisciplinary team and initiate plan and interventions as ordered.  - [place] patient to unit/surroundings  - Explain treatment plan  - Encourage participation in care  - Encourage verbalization of concerns/fears  - Identify coping mechanisms  - Assist in developing anxiety-reducing skills  - Administer/offer alternative therapies  - Limit or eliminate stimulants  Outcome: Not Progressing     Problem: Alteration in Orientation  Goal: Interact with staff daily  Description: Interventions:  - Assess and re-assess patient's level of orientation  - Engage patient in 1 on 1 interactions, daily, for a minimum of 15 minutes   - Establish rapport/trust with patient   Outcome: Not Progressing  Goal: Express concerns related to confused thinking related to:  Description: Interventions:  - Encourage patient to express feelings, fears, frustrations, hopes  - Assign consistent caregivers   - Firth/re-orient patient as needed  - Allow comfort items, as appropriate  - Provide visual cues, signs, etc.   Outcome: Not Progressing  Goal: Attend and participate in unit activities, including therapeutic, recreational, and educational groups  Description: Interventions:  - Provide therapeutic and educational activities daily, encourage attendance and participation, and document same in the medical record   - Provide appropriate opportunities for reminiscence   - Provide a consistent daily routine   - Encourage family contact/visitation   Outcome: Not Progressing  Goal: Complete daily ADLs, including personal hygiene independently, as able  Description: Interventions:  - Observe, teach, and assist patient with ADLS  Outcome: Not Progressing     Problem: Individualized Interventions  Goal: Patient will verbalize appropriate use of telephone within 5  days  Description: Interventions:  - Treatment team to determine use of supervised phone privileges   Outcome: Not Progressing  Goal: Patient will verbalize need for hospitalization and will no longer attempt elopement within 5 days  Description: Interventions:  - Ongoing education to help patient understand need for hospitalization  Outcome: Not Progressing  Goal: Patient will recognize inappropriate behaviors and develop alternative behaviors within 5 days  Description: Interventions:  - Patient in collaboration with Treatment Team will develop a behavior management plan to help identify effective coping skills to deal with stressors  Outcome: Not Progressing     Problem: Electroconvulsive therapy (ECT)  Goal: Verbalizes/displays adequate comfort level or baseline comfort level  Description: Interventions:  - Encourage patient to monitor pain and request assistance  - Assess pain using appropriate pain scale  - Administer analgesics based on type and severity of pain and evaluate response  - Implement non-pharmacological measures as appropriate and evaluate response  - Consider cultural and social influences on pain and pain management  - Notify physician/advanced practitioner if interventions unsuccessful or patient reports new pain  Outcome: Not Progressing  Goal: Achieves stable or improved neurological status  Description: INTERVENTIONS  - Monitor and report changes in neurological status  - Monitor vital signs such as temperature, blood pressure, glucose, and any other labs ordered   - Initiate measures to prevent increased intracranial pressure  - Monitor for seizure activity and implement precautions if appropriate      Outcome: Not Progressing  Goal: Achieves maximal functionality and self care  Description: INTERVENTIONS  - Monitor swallowing and airway patency with patient fatigue and changes in neurological status  - Encourage and assist patient to increase activity and self care.   - Encourage visually  impaired, hearing impaired and aphasic patients to use assistive/communication devices  Outcome: Not Progressing  Goal: Maintain or return mobility to safest level of function  Description: INTERVENTIONS:  - Assess patient's ability to carry out ADLs; assess patient's baseline for ADL function and identify physical deficits which impact ability to perform ADLs (bathing, care of mouth/teeth, toileting, grooming, dressing, etc.)  - Assess/evaluate cause of self-care deficits   - Assess range of motion  - Assess patient's mobility  - Assess patient's need for assistive devices and provide as appropriate  - Encourage maximum independence but intervene and supervise when necessary  - Involve family in performance of ADLs  - Assess for home care needs following discharge   - Consider OT consult to assist with ADL evaluation and planning for discharge  - Provide patient education as appropriate  Outcome: Not Progressing     Problem: DISCHARGE PLANNING - CARE MANAGEMENT  Goal: Discharge to post-acute care or home with appropriate resources  Description: INTERVENTIONS:  - Conduct assessment to determine patient/family and health care team treatment goals, and need for post-acute services based on payer coverage, community resources, and patient preferences, and barriers to discharge  - Address psychosocial, clinical, and financial barriers to discharge as identified in assessment in conjunction with the patient/family and health care team  - Arrange appropriate level of post-acute services according to patient’s   needs and preference and payer coverage in collaboration with the physician and health care team  - Communicate with and update the patient/family, physician, and health care team regarding progress on the discharge plan  - Arrange appropriate transportation to post-acute venues  Outcome: Not Progressing

## 2024-04-16 NOTE — PROGRESS NOTES
Psychiatry Progress Note [facility]    Alberto Berumen 28 y.o. male MRN: [id number]  Unit/Bed#: [facility] 107-01 Encounter: [id number]  Code Status: Level 1 - Full Code    PCP: Joce Juan MD    Date of Admission:  3/29/2024 2008   Date of Service:  04/16/24    Patient Active Problem List   Diagnosis    GERD (gastroesophageal reflux disease)    Medical clearance for psychiatric admission    Schizoaffective disorder, bipolar type (HCC)    Tobacco abuse    T wave inversion in EKG    Syringoma    Chronic idiopathic constipation    Vitamin B 12 deficiency    Vitamin D deficiency    Confluent and reticulate papillomatosis    Class 2 obesity in adult    Primary hypertension    Elevated hemoglobin A1c     Review of systems: Unremarkable, moving his bowels  diagnosis: Schizoaffective bipolar    Assessment  Overall Status: Continues to hear same voices threatening to kill him and his family which has not changed much but he claims ECT have definitely helped him and that he is not too much in his head!  Not aggressive or agitated compliant with medications but preferring to lay back on bed not attending to many groups somewhat isolated withdrawn but clean shaven  certification Statement: The patient will continue to require additional inpatient hospital stay due to ongoing voices that are threatening to kill him and his family lack of response to medications and ECT so far.     Medications: Clozapine 450 mg at bedtime, propranolol 10 mg every 12 hours as as needed for anxiety, Risperdal 1 mg at bedtime Lexapro 20 mg once a day, atropine eyedrops sublingual for drooling of saliva and senna 2 tablets twice a day for constipation  All medications reviewed and recommend they be continued for management of symptoms   side effects from treatment: None reported  Medication changes    Risperdal  increased as 2 mg as an adjunct with the clozapine on board for treatment resistant schizophrenia   Medication  education   Risks side effects benefits and precautions of medications discussed with patient and he did verbalize an understanding about risks for metabolic syndrome from being on neuroleptics and is form tardive dyskinesia etc. and special precautions about being on clozapine   Understanding of medications: Has some understanding   Justification for dual anti-psychotics: Not applicable    Non-pharmacological treatments  Continue with individual, group, milieu and occupational therapy using recovery principles and psycho-education about accepting illness and the need for treatment.   to contact aunt and explore ACT team referral which he never had  ECT to be continued  weekly for maintenance starting on 4/12/2024    Safety  Safety and communication plan established to target dynamic risk factors discussed above.    Discharge Plan   Back to his aunt with an ACT team    Interval Progress   Usually found laying in bed in his room but easily awakened readily gets up when approached.  Appears clean shaven on his face but appears to have poor ADLs needing reminders to change and take showers.  He is socially isolated with minimal interaction with peers attending only a handful of groups.  Claims to report voices as before that are threatening him and his family saying that he is going to kill him and he claims that ECT has definitely helped in decreasing the voices.  He has not been aggressive or agitated or threatening or self-abusive or demanding with no need for behavioral PRNs.  Talking to his aunt and his sister and is preoccupied with getting more ECTs but was reminded him that that now he is on a tapering dose of maintenance ECTs every week x 7 times from 4/12/24     Acceptance by patient: Accepting  Hopefulness in recovery: Living with his aunt  Involved in reintegration process: Talking with his aunt and with his sister  Trusting in relationship with psychiatrist: Appears to trust  Sleep:  Good  Appetite: Good  Compliance with Medications: Compliant  Group attendance: Only a few groups 4/7  Significant events: No significant changes    Mental Status Exam  Appearance: age appropriate, improved grooming, looks older than stated age, overweight, with hair and dreadlocks casually dressed with a clean shaven face, fairly groomed with good eye contact found in his room under the covers but readily got up when approached  behavior: cooperative, mildly anxious, evasive, gesturing, slow responses appearing internally attuned and preoccupied  Speech: normal rate, normal volume, normal pitch  Mood: dysphoric, anxious, reports he feels good with no good mood reactivity  Affect: constricted, inappropriate, mood-congruent  Thought Process: organized, logical, coherent, goal directed, linear, decreased rate of thoughts, slowing of thoughts, negative thinking, impaired abstract reasoning, concrete  Thought Content: paranoid ideation, some paranoia, grandiose ideas, intrusive thoughts, preoccupied, chronic,   appears paranoid about people threatening to kill him and his family because of the voices but states that he is not too upset about them since the ECTs were started.  No other delusions elicited.  No phobias obsessions compulsions or distorted body perceptions reported.  No current suicidal or homicidal thoughts and no plans verbalized.  perceptual Disturbances: auditory hallucinations, appears preoccupied, appears responding to internal stimuli, not observed, reports voices tell him that he and his family are going to be killed sometimes from 1 person on more than one person talking to him most of the day claiming he has not seen much difference other than a little bit subdued with the ECT.  .  Hx Risk Factors: chronic psychiatric problems, chronic anxiety symptoms, chronic psychotic symptoms, his aunt possibly moving away  Sensorium: Alert and oriented x 3 spheres and situation  cognition: recent and remote  memory grossly intact  Consciousness: alert and awake  Attention: attention span and concentration are age appropriate  Intellect: appears to be of average intelligence  Insight: intact  Judgement: intact  Motor Activity: no abnormal movements     Vitals  Temp:  [97.5 °F (36.4 °C)-97.6 °F (36.4 °C)] 97.5 °F (36.4 °C)  HR:  [] 105  Resp:  [18] 18  BP: (117-144)/(89-90) 117/89  SpO2:  [100 %] 100 %  No intake or output data in the 24 hours ending 04/16/24 2825          Lab Results: All Labs For Current Hospital Admission Reviewed     Current Facility-Administered Medications   Medication Dose Route Frequency Provider Last Rate    acetaminophen  650 mg Oral Q4H PRN Jordan C Holter, DO      acetaminophen  650 mg Oral Q6H PRN HOLLI Lion      aluminum-magnesium hydroxide-simethicone  30 mL Oral Q4H PRN Jordan C Holter,       amLODIPine  5 mg Oral Daily HOLLI Lion      Artificial Tears  1 drop Both Eyes Q3H PRN Jordan C Holter,       atropine  1 drop Sublingual HS HOLLI Lion      atropine  1 drop Sublingual Daily PRN HOLLI Lion      haloperidol lactate  2.5 mg Intramuscular Q4H PRN Max 4/day HOLLI Lion      And    LORazepam  1 mg Intramuscular Q4H PRN Max 4/day HOLLI Lion      And    benztropine  0.5 mg Intramuscular Q4H PRN Max 4/day HOLLI Lion      benztropine  1 mg Intramuscular Q4H PRN Max 6/day Jordan C Holter, DO      haloperidol lactate  5 mg Intramuscular Q4H PRN Max 4/day HOLLI Lion      And    LORazepam  2 mg Intramuscular Q4H PRN Max 4/day HOLLI Lion      And    benztropine  1 mg Intramuscular Q4H PRN Max 4/day HOLLI Lion      benztropine  1 mg Oral Q4H PRN Max 6/day HOLLI Lion      benztropine  1 mg Oral Q4H PRN Max 6/day Jordan C Holter,       bisacodyl  10 mg Rectal Daily PRN HOLLI Lion      cloZAPine  450 mg Oral HS Eveline Hangey, CRNP      cyanocobalamin  1,000 mcg Oral Daily HOLLI Galvan       hydrOXYzine HCL  50 mg Oral Q6H PRN Max 4/day [facility] Holter, DO      Or    diphenhydrAMINE  50 mg Intramuscular Q6H PRN [facility] Holter, DO      hydrOXYzine HCL  50 mg Oral Q6H PRN Max 4/day HOLLI Lion      Or    diphenhydrAMINE  50 mg Intramuscular Q6H PRN HOLLI Lion      diphenhydrAMINE-zinc acetate   Topical BID PRN HOLLI Lion      ergocalciferol  50,000 Units Oral Weekly HOLLI Galvan      escitalopram  20 mg Oral Daily HOLLI Lion      haloperidol  1 mg Oral Q6H PRN HOLLI Lion      haloperidol  2.5 mg Oral Q4H PRN Max 4/day HOLLI Lion      haloperidol  5 mg Oral Q4H PRN Max 4/day HOLLI Lion      hydrocortisone   Topical 4x Daily PRN HOLLI Lion      hydrOXYzine HCL  100 mg Oral Q6H PRN Max 4/day [facility] Holter, DO      Or    LORazepam  2 mg Intramuscular Q6H PRN [facility] Holter, DO      hydrOXYzine HCL  100 mg Oral Q6H PRN Max 4/day HOLLI Lion      Or    LORazepam  2 mg Intramuscular Q6H PRN HOLIL Lion      hydrOXYzine HCL  25 mg Oral Q6H PRN Max 4/day [facility] Holter, DO      ibuprofen  600 mg Oral Q8H PRN HOLLI Lion      melatonin  3 mg Oral HS [facility] Holter, DO      metFORMIN  500 mg Oral Daily With Breakfast HOLLI Lion      methocarbamol  500 mg Oral Q6H PRN HOLLI Lion      minocycline  100 mg Oral Q12H KAYLIE HOLLI Lion      nicotine polacrilex  2 mg Oral Q2H PRN [facility] Holter, DO      OLANZapine  5 mg Oral Q4H PRN Max 3/day [facility] Holter, DO      Or    OLANZapine  2.5 mg Intramuscular Q4H PRN Max 3/day [facility] Holter, DO      OLANZapine  5 mg Oral Q3H PRN Max 3/day [facility] Holter, DO      Or    OLANZapine  5 mg Intramuscular Q3H PRN Max 3/day [facility] Holter, DO      OLANZapine  2.5 mg Oral Q4H PRN Max 6/day [facility] Holter, DO      ondansetron  4 mg Oral Q6H PRN HOLLI Lion      polyethylene glycol  17 g Oral Daily HOLLI Lion      polyethylene glycol  17 g  Oral Daily PRN Jordan C Holter, DO      propranolol  10 mg Oral Q12H [facility] HOLLI Lion      risperiDONE  1 mg Oral HS Bora Rosario MD      senna-docusate sodium  1 tablet Oral Daily PRN Jordan C Holter, DO      senna-docusate sodium  2 tablet Oral BID HOLLI Lion      traZODone  50 mg Oral HS PRN HOLLI Lion      white petrolatum-mineral oil   Topical TID PRN HOLLI Lion         Counseling / Coordination of Care: Total floor / unit time spent today 15 minutes. Greater than 50% of total time was spent with the patient and / or family counseling and / or somewhat receptive to supportive listening and teaching positive coping skills to deal with symptom mangement.     Patient's Rights, confidentiality and exceptions to confidentiality, use of automated medical record, Behavioral Health Services staff access to medical record, and consent to treatment reviewed.    This note has been dictated and hence there may be problems with punctuation, spelling and formatting and if anyone has any concerns please address them to Dr. Rosario   This note is not shared with patient due to potential for making patient's condition worse by knowing the content of the note.

## 2024-04-17 PROCEDURE — 99232 SBSQ HOSP IP/OBS MODERATE 35: CPT | Performed by: PSYCHIATRY & NEUROLOGY

## 2024-04-17 RX ORDER — CLOZAPINE 200 MG/1
400 TABLET ORAL
Status: DISCONTINUED | OUTPATIENT
Start: 2024-04-17 | End: 2024-04-23

## 2024-04-17 RX ADMIN — NICOTINE POLACRILEX 2 MG: 2 GUM, CHEWING BUCCAL at 08:30

## 2024-04-17 RX ADMIN — CYANOCOBALAMIN TAB 1000 MCG 1000 MCG: 1000 TAB at 08:31

## 2024-04-17 RX ADMIN — NICOTINE POLACRILEX 2 MG: 2 GUM, CHEWING BUCCAL at 21:19

## 2024-04-17 RX ADMIN — MELATONIN TAB 3 MG 3 MG: 3 TAB at 21:19

## 2024-04-17 RX ADMIN — NICOTINE POLACRILEX 2 MG: 2 GUM, CHEWING BUCCAL at 18:09

## 2024-04-17 RX ADMIN — MINOCYCLINE HYDROCHLORIDE 100 MG: 100 CAPSULE ORAL at 08:31

## 2024-04-17 RX ADMIN — METFORMIN HYDROCHLORIDE 500 MG: 500 TABLET ORAL at 08:31

## 2024-04-17 RX ADMIN — RISPERIDONE 2 MG: 2 TABLET, FILM COATED ORAL at 21:19

## 2024-04-17 RX ADMIN — SENNOSIDES AND DOCUSATE SODIUM 2 TABLET: 8.6; 5 TABLET ORAL at 17:14

## 2024-04-17 RX ADMIN — PROPRANOLOL HYDROCHLORIDE 10 MG: 10 TABLET ORAL at 21:19

## 2024-04-17 RX ADMIN — ESCITALOPRAM OXALATE 20 MG: 10 TABLET, FILM COATED ORAL at 08:31

## 2024-04-17 RX ADMIN — ATROPINE SULFATE 1 DROP: 10 SOLUTION/ DROPS OPHTHALMIC at 21:18

## 2024-04-17 RX ADMIN — POLYETHYLENE GLYCOL 3350 17 G: 17 POWDER, FOR SOLUTION ORAL at 08:30

## 2024-04-17 RX ADMIN — NICOTINE POLACRILEX 2 MG: 2 GUM, CHEWING BUCCAL at 13:01

## 2024-04-17 RX ADMIN — AMLODIPINE BESYLATE 5 MG: 5 TABLET ORAL at 08:31

## 2024-04-17 RX ADMIN — SENNOSIDES AND DOCUSATE SODIUM 2 TABLET: 8.6; 5 TABLET ORAL at 08:31

## 2024-04-17 RX ADMIN — PROPRANOLOL HYDROCHLORIDE 10 MG: 10 TABLET ORAL at 08:31

## 2024-04-17 RX ADMIN — CLOZAPINE 400 MG: 200 TABLET ORAL at 21:19

## 2024-04-17 RX ADMIN — MINOCYCLINE HYDROCHLORIDE 100 MG: 100 CAPSULE ORAL at 21:19

## 2024-04-17 NOTE — NURSING NOTE
"Pt is accepting of medications without incidence and meal compliant consuming 100% of both meals. Pt is visible in the milieu and social with select staff and peers. Pt attends groups and makes needs known. Pt observed joking with staff about \"wishing there was a smoking area here\", but understands the hospital rules. Pt endoreses AH, but states \" they're the same old\". No new concerns at this time.   "

## 2024-04-17 NOTE — PLAN OF CARE
Problem: Alteration in Thoughts and Perception  Goal: Verbalize thoughts and feelings  Description: Interventions:  - Promote a nonjudgmental and trusting relationship with the patient through active listening and therapeutic communication  - Assess patient's level of functioning, behavior and potential for risk  - Engage patient in 1 on 1 interactions  - Encourage patient to express fears, feelings, frustrations, and discuss symptoms    - [place] patient to reality, help patient recognize reality-based thinking   - Administer medications as ordered and assess for potential side effects  - Provide the patient education related to the signs and symptoms of the illness and desired effects of prescribed medications  Outcome: Progressing  Goal: Refrain from acting on delusional thinking/internal stimuli  Description: Interventions:  - Monitor patient closely, per order   - Utilize least restrictive measures   - Set reasonable limits, give positive feedback for acceptable   - Administer medications as ordered and monitor of potential side effects  Outcome: Progressing  Goal: Agree to be compliant with medication regime, as prescribed and report medication side effects  Description: Interventions:  - Offer appropriate PRN medication and supervise ingestion; conduct AIMS, as needed   Outcome: Progressing  Goal: Attend and participate in unit activities, including therapeutic, recreational, and educational groups  Description: Interventions:  -Encourage Visitation and family involvement in care  Outcome: Progressing  Goal: Recognize dysfunctional thoughts, communicate reality-based thoughts at the time of discharge  Description: Interventions:  - Provide medication and psycho-education to assist patient in compliance and developing insight into his/her illness   Outcome: Progressing  Goal: Complete daily ADLs, including personal hygiene independently, as able  Description: Interventions:  - Observe, teach, and assist  patient with ADLS  - Monitor and promote a balance of rest/activity, with adequate nutrition and elimination   Outcome: Progressing     Problem: Ineffective Coping  Goal: Identifies ineffective coping skills  Outcome: Progressing  Goal: Identifies healthy coping skills  Outcome: Progressing  Goal: Demonstrates healthy coping skills  Outcome: Progressing  Goal: Participates in unit activities  Description: Interventions:  - Provide therapeutic environment   - Provide required programming   - Redirect inappropriate behaviors   Outcome: Progressing  Goal: Patient/Family participate in treatment and DC plans  Description: Interventions:  - Provide therapeutic environment  Outcome: Progressing     Problem: Depression  Goal: Refrain from harming self  Description: Interventions:  - Monitor patient closely, per order   - Supervise medication ingestion, monitor effects and side effects   Outcome: Progressing  Goal: Refrain from isolation  Description: Interventions:  - Develop a trusting relationship   - Encourage socialization   Outcome: Progressing  Goal: Refrain from self-neglect  Outcome: Progressing  Goal: Attend and participate in unit activities, including therapeutic, recreational, and educational groups  Description: Interventions:  - Provide therapeutic and educational activities daily, encourage attendance and participation, and document same in the medical record   Outcome: Progressing  Goal: Complete daily ADLs, including personal hygiene independently, as able  Description: Interventions:  - Observe, teach, and assist patient with ADLS  -  Monitor and promote a balance of rest/activity, with adequate nutrition and elimination   Outcome: Progressing     Problem: Anxiety  Goal: Anxiety is at manageable level  Description: Interventions:  - Assess and monitor patient's anxiety level.   - Monitor for signs and symptoms (heart palpitations, chest pain, shortness of breath, headaches, nausea, feeling jumpy,  restlessness, irritable, apprehensive).   - Collaborate with interdisciplinary team and initiate plan and interventions as ordered.  - [place] patient to unit/surroundings  - Explain treatment plan  - Encourage participation in care  - Encourage verbalization of concerns/fears  - Identify coping mechanisms  - Assist in developing anxiety-reducing skills  - Administer/offer alternative therapies  - Limit or eliminate stimulants  Outcome: Progressing     Problem: Alteration in Thoughts and Perception  Goal: Treatment Goal: Gain control of psychotic behaviors/thinking, reduce/eliminate presenting symptoms and demonstrate improved reality functioning upon discharge  Outcome: Not Progressing     Problem: Depression  Goal: Treatment Goal: Demonstrate behavioral control of depressive symptoms, verbalize feelings of improved mood/affect, and adopt new coping skills prior to discharge  Outcome: Not Progressing

## 2024-04-17 NOTE — NURSING NOTE
Pt is present on the milieu and social with select peers. He consumed 100 % of dinner. Pulse elevated at 1531 it was 115. Manual recheck 110. Pulse elevated at 1959 it was 105. Manual recheck it was 104. Took his medications without incidence. Nicorette gum given at 1531, 1737, and 2005. Pt endorses depression and hears voices saying negative things. Pt is polite, pleasant, and cooperative. Pt attended all evening groups. No behavioral issues.

## 2024-04-17 NOTE — PROGRESS NOTES
04/17/24 0743   Team Members Present   Team Members Present Physician;Nurse;;Other (Discipline and Name)   Patient/Family Present   Patient Present No   Patient's Family Present No     In attendance:  MD Eveline Gamino, HOLLI Lucas, RN  Luisana Alvarado, [facility]  Carla Lux, W  MATILDA Daniel.S.    Groups: 7/10    Pt is scheduled for ECT Friday. Pt remains isolative and social with select peers. Clozapine lowered to 400mg. No bx issues noted.

## 2024-04-17 NOTE — PROGRESS NOTES
Psychiatry Progress Note [facility]    Alberto Berumen 28 y.o. male MRN: [id number]  Unit/Bed#: [facility] 101-02 Encounter: [id number]  Code Status: Level 1 - Full Code    PCP: Joce Juan MD    Date of Admission:  3/29/2024 2008   Date of Service:  04/17/24    Patient Active Problem List   Diagnosis    GERD (gastroesophageal reflux disease)    Medical clearance for psychiatric admission    Schizoaffective disorder, bipolar type (HCC)    Tobacco abuse    T wave inversion in EKG    Syringoma    Chronic idiopathic constipation    Vitamin B 12 deficiency    Vitamin D deficiency    Confluent and reticulate papillomatosis    Class 2 obesity in adult    Primary hypertension    Elevated hemoglobin A1c     Review of systems: Unremarkable but pulse was slightly high yesterday.  Bili was slightly high at 1353 and we will decrease the clozapine  diagnosis: Schizoaffective bipolar    Assessment  Overall Status: Was reportedly found responding to internal stimuli and still admitting to hearing voices that threaten to kill him and his family but not commanding and he continues to insist that he needs more ECTs which he is now getting his maintenance ECTs once a week due next this Friday  certification Statement: The patient will continue to require additional inpatient hospital stay due to ongoing voices that are threatening to kill him and his family lack of response to medications and ECT so far.     Medications: Clozapine 450 mg at bedtime, propranolol 10 mg every 12 hours as as needed for anxiety, Risperdal 1 mg at bedtime Lexapro 20 mg once a day, atropine eyedrops sublingual for drooling of saliva and senna 2 tablets twice a day for constipation  Medications reviewed and recommend they be continued for management of symptoms but clozapine to be lowered because level is slightly high and because of tachycardia  Side effects from treatment: None reported  Medication changes   Clozapine decreased dose 400  "mg for 50 mg at bedtime   Medication education   Risks side effects benefits and precautions of medications discussed with patient and he did verbalize an understanding about risks for metabolic syndrome from being on neuroleptics and is form tardive dyskinesia etc. and special precautions about being on clozapine   Understanding of medications: Has some understanding   Justification for dual anti-psychotics: Not applicable    Non-pharmacological treatments  Continue with individual, group, milieu and occupational therapy using recovery principles and psycho-education about accepting illness and the need for treatment.   to contact aunt and explore ACT team referral which he never had  ECT to be continued  weekly for maintenance starting on 4/12/2024    Safety  Safety and communication plan established to target dynamic risk factors discussed above.    Discharge Plan   Back to his aunt with an ACT team    Interval Progress   Did cooperate with the room changes today.  Still initially found laying in bed in his room but easily awakened and continues to report same threatening voices telling him that he is going to be killed along with his family but states ECTs  hve significantly helped in decreasing the voices so that he is \"not much in his head\".  Eating meals attending some groups taking showers clean-shaven with developmental interaction with peers and staff but not aggressive or agitated or threatening or self-abusive or demanding with no need for behavioral PRNs.  Still talking with his aunt and sister   Acceptance by patient: Accepting  Hopefulness in recovery: Living back with his aunt  Involved in reintegration process: Talking with his aunt and sister  Trusting in relationship with psychiatrist: Trusting  Sleep: Good  Appetite: Good  Compliance with Medications: Compliant  Group attendance: Some groups  Significant events: Status quo    Mental Status Exam  Appearance: age appropriate, improved " grooming, looks older than stated age, overweight, with hair and dreadlocks casually dressed with a clean shaven face, fairly groomed with good eye contact found laying in bed in his room under the covers but easily awakened clean-shaven   behavior: cooperative, mildly anxious, evasive, gesturing, slow responses still internally attuned preoccupied  Speech: normal rate, normal volume, normal pitch  Mood: dysphoric, anxious, reports he feels good with some mood reactivity  Affect: constricted, inappropriate, mood-congruent  Thought Process: organized, logical, coherent, goal directed, linear, decreased rate of thoughts, slowing of thoughts, negative thinking, impaired abstract reasoning, concrete  Thought Content: paranoid ideation, some paranoia, grandiose ideas, intrusive thoughts, preoccupied, chronic,   still paranoid about people are threatening to kill him and his family because of voices saying so.  He claims that ECT is still helped in controlling them that he is not too preoccupied with it.  No current suicidal or homicidal thoughts and no plans verbalized no phobias obsessions compulsions or distorted body perceptions reported  perceptual Disturbances: auditory hallucinations, appears preoccupied, appears responding to internal stimuli, not observed, reports voices tell him that he and his family are going to be killed sometimes from 1 person on more than one person talking to him most of the day claiming he has not seen much difference other than a little bit subdued with the ECT.  .  Hx Risk Factors: chronic psychiatric problems, chronic anxiety symptoms, chronic psychotic symptoms, his aunt possibly moving away  Sensorium: Oriented x 3 spheres and situation  cognition: recent and remote memory grossly intact  Consciousness: alert and awake  Attention: attention span and concentration are age appropriate  Intellect: appears to be of average intelligence  Insight: intact  Judgement: intact  Motor Activity:  no abnormal movements     Vitals  Temp:  [97.5 °F (36.4 °C)-98.2 °F (36.8 °C)] 97.5 °F (36.4 °C)  HR:  [] 105  Resp:  [18-19] 18  BP: (103-159)/(52-90) 140/85  SpO2:  [98 %-100 %] 98 %  No intake or output data in the 24 hours ending 04/17/24 4702          Lab Results: All Labs For Current Hospital Admission Reviewed     Current Facility-Administered Medications   Medication Dose Route Frequency Provider Last Rate    acetaminophen  650 mg Oral Q4H PRN Jordan C Holter,       acetaminophen  650 mg Oral Q6H PRN HOLLI Lion      aluminum-magnesium hydroxide-simethicone  30 mL Oral Q4H PRN Jordan C Holter,       amLODIPine  5 mg Oral Daily HOLLI Lion      Artificial Tears  1 drop Both Eyes Q3H PRN Jordan C Holter, DO      atropine  1 drop Sublingual HS HOLLI Lion      atropine  1 drop Sublingual Daily PRN HOLLI Lion      haloperidol lactate  2.5 mg Intramuscular Q4H PRN Max 4/day STEVE LionNP      And    LORazepam  1 mg Intramuscular Q4H PRN Max 4/day HOLLI Lion      And    benztropine  0.5 mg Intramuscular Q4H PRN Max 4/day HOLLI Lion      benztropine  1 mg Intramuscular Q4H PRN Max 6/day Jordan C Holter, DO      haloperidol lactate  5 mg Intramuscular Q4H PRN Max 4/day HOLLI Lion      And    LORazepam  2 mg Intramuscular Q4H PRN Max 4/day HOLLI Lion      And    benztropine  1 mg Intramuscular Q4H PRN Max 4/day HOLLI Lion      benztropine  1 mg Oral Q4H PRN Max 6/day HOLLI Lion      benztropine  1 mg Oral Q4H PRN Max 6/day Jordan C Holter, DO      bisacodyl  10 mg Rectal Daily PRN HOLLI Lion      cloZAPine  450 mg Oral HS HOLLI Lion      cyanocobalamin  1,000 mcg Oral Daily HOLLI Galvan      hydrOXYzine HCL  50 mg Oral Q6H PRN Max 4/day Jordan C Holter,       Or    diphenhydrAMINE  50 mg Intramuscular Q6H PRN Jordan C Holter,       hydrOXYzine HCL  50 mg Oral Q6H PRN Max 4/day Eveline Hunt,  CRNP      Or    diphenhydrAMINE  50 mg Intramuscular Q6H PRN HOLLI Lion      diphenhydrAMINE-zinc acetate   Topical BID PRN HOLLI Lion      ergocalciferol  50,000 Units Oral Weekly HOLLI Galvan      escitalopram  20 mg Oral Daily HOLLI Lion      haloperidol  1 mg Oral Q6H PRN HOLLI Lion      haloperidol  2.5 mg Oral Q4H PRN Max 4/day HOLLI Lion      haloperidol  5 mg Oral Q4H PRN Max 4/day HOLLI Lion      hydrocortisone   Topical 4x Daily PRN HOLLI Lion      hydrOXYzine HCL  100 mg Oral Q6H PRN Max 4/day [facility] Holter, DO      Or    LORazepam  2 mg Intramuscular Q6H PRN [facility] Holter, DO      hydrOXYzine HCL  100 mg Oral Q6H PRN Max 4/day HOLLI Lion      Or    LORazepam  2 mg Intramuscular Q6H PRN HOLLI Lion      hydrOXYzine HCL  25 mg Oral Q6H PRN Max 4/day [facility] Holter, DO      ibuprofen  600 mg Oral Q8H PRN HOLLI Lion      melatonin  3 mg Oral HS [facility] Holter, DO      metFORMIN  500 mg Oral Daily With Breakfast HOLLI Lion      methocarbamol  500 mg Oral Q6H PRN HOLLI Lion      minocycline  100 mg Oral Q12H KAYLIE HOLLI Lion      nicotine polacrilex  2 mg Oral Q2H PRN [facility] Holter, DO      OLANZapine  5 mg Oral Q4H PRN Max 3/day [facility] Holter, DO      Or    OLANZapine  2.5 mg Intramuscular Q4H PRN Max 3/day [facility] Holter, DO      OLANZapine  5 mg Oral Q3H PRN Max 3/day [facility] Holter, DO      Or    OLANZapine  5 mg Intramuscular Q3H PRN Max 3/day [facility] Holter, DO      OLANZapine  2.5 mg Oral Q4H PRN Max 6/day [facility] Holter, DO      ondansetron  4 mg Oral Q6H PRN HOLLI Lion      polyethylene glycol  17 g Oral Daily HOLLI Lion      polyethylene glycol  17 g Oral Daily PRN [facility] Holter, DO      propranolol  10 mg Oral Q12H KAYLIE Eveline Hangey, CRNP      risperiDONE  2 mg Oral HS Bora Rosario MD      senrosibel-docusate sodium  1 tablet Oral Daily PRN Jordan C Holter, DO       senna-docusate sodium  2 tablet Oral BID HOLLI Lion      traZODone  50 mg Oral HS PRN HOLLI Lion      white petrolatum-mineral oil   Topical TID PRN HOLLI Lion         Counseling / Coordination of Care: Total floor / unit time spent today 15 minutes. Greater than 50% of total time was spent with the patient and / or family counseling and / or somewhat receptive to supportive listening and teaching positive coping skills to deal with symptom mangement.     Patient's Rights, confidentiality and exceptions to confidentiality, use of automated medical record, Behavioral Health Services staff access to medical record, and consent to treatment reviewed.    This note has been dictated and hence there may be problems with punctuation, spelling and formatting and if anyone has any concerns please address them to Dr. Rosario   This note is not shared with patient due to potential for making patient's condition worse by knowing the content of the note.

## 2024-04-17 NOTE — SOCIAL WORK
SW checked in with pt. Pt reports no new concerns and that he's enjoying going to groups. Pt denied need for 1:1 therapy session today and attended group.

## 2024-04-17 NOTE — PLAN OF CARE
Problem: Ineffective Coping  Goal: Identifies ineffective coping skills  Outcome: Progressing  Goal: Identifies healthy coping skills  Outcome: Progressing  Goal: Demonstrates healthy coping skills  Outcome: Progressing  Goal: Participates in unit activities  Description: Interventions:  - Provide therapeutic environment   - Provide required programming   - Redirect inappropriate behaviors   Outcome: Progressing

## 2024-04-18 PROCEDURE — 99232 SBSQ HOSP IP/OBS MODERATE 35: CPT | Performed by: PSYCHIATRY & NEUROLOGY

## 2024-04-18 RX ADMIN — CYANOCOBALAMIN TAB 1000 MCG 1000 MCG: 1000 TAB at 08:38

## 2024-04-18 RX ADMIN — METFORMIN HYDROCHLORIDE 500 MG: 500 TABLET ORAL at 08:38

## 2024-04-18 RX ADMIN — ESCITALOPRAM OXALATE 20 MG: 10 TABLET, FILM COATED ORAL at 08:38

## 2024-04-18 RX ADMIN — PROPRANOLOL HYDROCHLORIDE 10 MG: 10 TABLET ORAL at 08:38

## 2024-04-18 RX ADMIN — NICOTINE POLACRILEX 2 MG: 2 GUM, CHEWING BUCCAL at 14:37

## 2024-04-18 RX ADMIN — NICOTINE POLACRILEX 2 MG: 2 GUM, CHEWING BUCCAL at 08:39

## 2024-04-18 RX ADMIN — CLOZAPINE 400 MG: 200 TABLET ORAL at 21:14

## 2024-04-18 RX ADMIN — NICOTINE POLACRILEX 2 MG: 2 GUM, CHEWING BUCCAL at 18:57

## 2024-04-18 RX ADMIN — MELATONIN TAB 3 MG 3 MG: 3 TAB at 21:14

## 2024-04-18 RX ADMIN — ATROPINE SULFATE 1 DROP: 10 SOLUTION/ DROPS OPHTHALMIC at 21:14

## 2024-04-18 RX ADMIN — NICOTINE POLACRILEX 2 MG: 2 GUM, CHEWING BUCCAL at 12:37

## 2024-04-18 RX ADMIN — POLYETHYLENE GLYCOL 3350 17 G: 17 POWDER, FOR SOLUTION ORAL at 08:39

## 2024-04-18 RX ADMIN — NICOTINE POLACRILEX 2 MG: 2 GUM, CHEWING BUCCAL at 21:26

## 2024-04-18 RX ADMIN — RISPERIDONE 2 MG: 2 TABLET, FILM COATED ORAL at 21:14

## 2024-04-18 RX ADMIN — SENNOSIDES AND DOCUSATE SODIUM 2 TABLET: 8.6; 5 TABLET ORAL at 08:38

## 2024-04-18 RX ADMIN — PROPRANOLOL HYDROCHLORIDE 10 MG: 10 TABLET ORAL at 21:14

## 2024-04-18 RX ADMIN — SENNOSIDES AND DOCUSATE SODIUM 2 TABLET: 8.6; 5 TABLET ORAL at 17:19

## 2024-04-18 NOTE — PROGRESS NOTES
04/18/24 0740   Team Meeting   Meeting Type Daily Rounds   Team Members Present   Team Members Present Physician;Nurse;;Other (Discipline and Name)   Patient/Family Present   Patient Present No   Patient's Family Present No     In attendance:  MD Eveline Gamino, HOLLI Lucas, RN  Luisana Alvarado, [facility]  Carla Lux, W  MATILDA Daniel.S.    Groups: 7/9    Pt is more visible on the unit and social with peers. Pt reports ongoing auditory hallucinations but denies any other concerns. Pt is scheduled for ECT tomorrow. Pt is overall pleasant with no bx issues.

## 2024-04-18 NOTE — NURSING NOTE
"Pt is accepting of medications without incidence and meal compliant consuming 100% of breakfast and 75% of lunch. Pt reports AH, but denies all other s/s. Pt is visible in the milieu for groups and makes needs known. Pt is polite and social with select peers. Pt voices being \"annoyed with the other patients arguing over nothing, oranges, who argues over oranges\". No new concerns at this time.   "

## 2024-04-18 NOTE — PLAN OF CARE
Problem: Alteration in Thoughts and Perception  Goal: Treatment Goal: Gain control of psychotic behaviors/thinking, reduce/eliminate presenting symptoms and demonstrate improved reality functioning upon discharge  Outcome: Progressing  Goal: Verbalize thoughts and feelings  Description: Interventions:  - Promote a nonjudgmental and trusting relationship with the patient through active listening and therapeutic communication  - Assess patient's level of functioning, behavior and potential for risk  - Engage patient in 1 on 1 interactions  - Encourage patient to express fears, feelings, frustrations, and discuss symptoms    - [place] patient to reality, help patient recognize reality-based thinking   - Administer medications as ordered and assess for potential side effects  - Provide the patient education related to the signs and symptoms of the illness and desired effects of prescribed medications  Outcome: Progressing  Goal: Refrain from acting on delusional thinking/internal stimuli  Description: Interventions:  - Monitor patient closely, per order   - Utilize least restrictive measures   - Set reasonable limits, give positive feedback for acceptable   - Administer medications as ordered and monitor of potential side effects  Outcome: Progressing  Goal: Agree to be compliant with medication regime, as prescribed and report medication side effects  Description: Interventions:  - Offer appropriate PRN medication and supervise ingestion; conduct AIMS, as needed   Outcome: Progressing  Goal: Attend and participate in unit activities, including therapeutic, recreational, and educational groups  Description: Interventions:  -Encourage Visitation and family involvement in care  Outcome: Progressing  Goal: Recognize dysfunctional thoughts, communicate reality-based thoughts at the time of discharge  Description: Interventions:  - Provide medication and psycho-education to assist patient in compliance and developing  insight into his/her illness   Outcome: Progressing  Goal: Complete daily ADLs, including personal hygiene independently, as able  Description: Interventions:  - Observe, teach, and assist patient with ADLS  - Monitor and promote a balance of rest/activity, with adequate nutrition and elimination   Outcome: Progressing     Problem: Ineffective Coping  Goal: Identifies ineffective coping skills  Outcome: Progressing  Goal: Identifies healthy coping skills  Outcome: Progressing  Goal: Demonstrates healthy coping skills  Outcome: Progressing  Goal: Participates in unit activities  Description: Interventions:  - Provide therapeutic environment   - Provide required programming   - Redirect inappropriate behaviors   Outcome: Progressing     Problem: Depression  Goal: Treatment Goal: Demonstrate behavioral control of depressive symptoms, verbalize feelings of improved mood/affect, and adopt new coping skills prior to discharge  Outcome: Progressing  Goal: Verbalize thoughts and feelings  Description: Interventions:  - Assess and re-assess patient's level of risk   - Engage patient in 1:1 interactions, daily, for a minimum of 15 minutes   - Encourage patient to express feelings, fears, frustrations, hopes   Outcome: Progressing  Goal: Refrain from harming self  Description: Interventions:  - Monitor patient closely, per order   - Supervise medication ingestion, monitor effects and side effects   Outcome: Progressing  Goal: Refrain from isolation  Description: Interventions:  - Develop a trusting relationship   - Encourage socialization   Outcome: Progressing  Goal: Refrain from self-neglect  Outcome: Progressing  Goal: Attend and participate in unit activities, including therapeutic, recreational, and educational groups  Description: Interventions:  - Provide therapeutic and educational activities daily, encourage attendance and participation, and document same in the medical record   Outcome: Progressing  Goal: Complete  daily ADLs, including personal hygiene independently, as able  Description: Interventions:  - Observe, teach, and assist patient with ADLS  -  Monitor and promote a balance of rest/activity, with adequate nutrition and elimination   Outcome: Progressing     Problem: Anxiety  Goal: Anxiety is at manageable level  Description: Interventions:  - Assess and monitor patient's anxiety level.   - Monitor for signs and symptoms (heart palpitations, chest pain, shortness of breath, headaches, nausea, feeling jumpy, restlessness, irritable, apprehensive).   - Collaborate with interdisciplinary team and initiate plan and interventions as ordered.  - [place] patient to unit/surroundings  - Explain treatment plan  - Encourage participation in care  - Encourage verbalization of concerns/fears  - Identify coping mechanisms  - Assist in developing anxiety-reducing skills  - Administer/offer alternative therapies  - Limit or eliminate stimulants  Outcome: Progressing

## 2024-04-18 NOTE — NURSING NOTE
"Lela has been awake, alert, and visible intermittently out in the milieu.  Pt ate 100% supper.  Pt social with select peers.  Pt polite and cooperative.  Affect flat but brightens on approach.  Pt rated his depression a #4/10 and denies any anxiety.  Pt stated that he hears voices \"that say they want to kill me.\" But has not verbalized any delusions. Pt stated that he prays and uses the punching bag to help block the voices.   Pt spends time watching tv in the living room.  Pt attended and participated in evening coping skills group, wrap up group, and had snack after with peers.  Pt requested prn Nicotine gum and 2 mg po given at 1857.  Pt cooperative with taking scheduled medications as ordered and without incident.  Pt maintained on continuous safety checks.  Will continue to monitor/assess for any changes in behavior.  "

## 2024-04-18 NOTE — NURSING NOTE
"Alberto has been awake, alert, and visible intermittently out in the milieu.  Pt ate 100% supper.  Pt social with select peers as observed playing cards with peers in dining room..  Pt polite, and cooperative.  Affect flat, but pleasant on approach.  Pt stated that he has \"a little depression and anxiety\", denies any a/v hallucinations, and has not verbalized any delusions. Pt attended and participated in coping skills group, evening nursing group, wrap up group, and had snack after with peers.  Pt cooperative with taking scheduled medications as ordered and without incident.  Pt maintained on continuous safety checks.  Will continue to monitor/assess for any changes in behavior.  "

## 2024-04-18 NOTE — PROGRESS NOTES
Psychiatry Progress Note [facility]    Alberto Berumen 28 y.o. male MRN: [id number]  Unit/Bed#: [facility] 101-02 Encounter: [id number]  Code Status: Level 1 - Full Code    PCP: Joce Juan MD    Date of Admission:  3/29/2024 2008   Date of Service:  04/18/24    Patient Active Problem List   Diagnosis    GERD (gastroesophageal reflux disease)    Medical clearance for psychiatric admission    Schizoaffective disorder, bipolar type (HCC)    Tobacco abuse    T wave inversion in EKG    Syringoma    Chronic idiopathic constipation    Vitamin B 12 deficiency    Vitamin D deficiency    Confluent and reticulate papillomatosis    Class 2 obesity in adult    Primary hypertension    Elevated hemoglobin A1c     Review of systems: Unremarkable  Diagnosis: Schizoaffective Bipolar    Assessment  Overall Status: Continues to hear threatening voices to kill him and his family but he states ECTs have definitely helped and reasonable however to having more.  Not aggressive or agitated or threatening or self-abusive with no need for behavioral PRNs.  Staying to himself mostly in his room comes out for meals and is attending some groups  certification Statement: The patient will continue to require additional inpatient hospital stay due to ongoing voices that are threatening to kill him and his family lack of response to medications and ECT so far.     Medications: Clozapine 400 mg at bedtime, propranolol 10 mg every 12 hours as as needed for anxiety, Risperdal 2 mg at bedtime Lexapro 20 mg once a day, atropine eyedrops sublingual for drooling of saliva and senna 2 tablets twice a day for constipation  All medications reviewed and recommend they be continued for symptom management   Side effects from treatment: None reported  Medication changes   None today   Medication education   Risks side effects benefits and precautions of medications discussed with patient and he did verbalize an understanding about risks for  "metabolic syndrome from being on neuroleptics and is form tardive dyskinesia etc. and special precautions about being on clozapine   Understanding of medications: Has some understanding   Justification for dual anti-psychotics: Not applicable    Non-pharmacological treatments  Continue with individual, group, milieu and occupational therapy using recovery principles and psycho-education about accepting illness and the need for treatment.   to contact aunt and explore ACT team referral which he never had  ECT to be continued  weekly for maintenance starting on 4/12/2024    Safety  Safety and communication plan established to target dynamic risk factors discussed above.    Discharge Plan   Back to his aunt with an ACT team    Interval Progress   Continues to ask for more ECTs but was educated with neurology able to give maintenance ECTs at this time and he is looking forward to having it tomorrow as they have definitely helped in decreasing the voices so he does not have to \"live in my head that much\".  Not aggressive or agitated or threatening or self-abusive or demanding and remains clean shaven with hair and dreadlocks staying to himself somewhat withdrawn with some social isolation.  No behavioral PRNs during the last 24 hours.  Continues to talk with his sister and his aunt, pleasant, bright on approach, no behav issues.  Patient tells me he has been struggling with these voices for more than 2 years and I tried to reason with him that nothing has happened yet and he needs to learn to tune them out and he said he will see what he can do about it.  Looking forward to getting ECT tomorrow   Acceptance by patient: Accepting  Hopefulness in recovery: Living back with his aunt  Involved in reintegration process: Talking with his aunt and sister  Trusting in relationship with psychiatrist: Trusting  Sleep: Good  Appetite: Good  Compliance with Medications: Compliant  Group attendance: Attending some " 7/9  Significant events: No significant changes    Mental Status Exam  Appearance: age appropriate, improved grooming, looks older than stated age, overweight, with hair in dreadlocks casually dressed with a clean shaven face, fairly groomed with good eye contact laying on bed in his room under the covers clean-shaven easily awakened  behavior: cooperative, mildly anxious, evasive, gesturing, slow responses remains internally preoccupied and returned   speech: normal rate, normal volume, normal pitch  Mood: dysphoric, anxious, reports he feels good with some anxiety over the paranoia  Affect: constricted, inappropriate, mood-congruent  Thought Process: organized, logical, coherent, goal directed, linear, decreased rate of thoughts, slowing of thoughts, negative thinking, impaired abstract reasoning, concrete  Thought Content: paranoid ideation, some paranoia, grandiose ideas, intrusive thoughts, preoccupied, chronic, continues to report paranoia about people threatening to kill him and his family because of the voices.  He believes ECT has helped so that he is not much in his head.  No other delusions elicited.  No current suicidal or homicidal thoughts and no plans verbalized.  No phobias obsessions compulsions or distorted body perceptions reported.   Perceptual Disturbances: auditory hallucinations, appears preoccupied, appears responding to internal stimuli, not observed, reports voices tell him that he and his family are going to be killed sometimes from 1 person on more than one person talking to him most of the day claiming he has not seen much difference other than a little bit subdued with the ECT.  .  Hx Risk Factors: chronic psychiatric problems, chronic anxiety symptoms, chronic psychotic symptoms, his aunt possibly moving away  Sensorium: Oriented x 3 spheres and situation  Cognition: recent and remote memory grossly intact  Consciousness: alert and awake  Attention: attention span and concentration are  age appropriate  Intellect: appears to be of average intelligence  Insight: intact  Judgement: intact  Motor Activity: no abnormal movements     Vitals  Temp:  [97.6 °F (36.4 °C)-97.8 °F (36.6 °C)] 97.6 °F (36.4 °C)  HR:  [89-99] 99  Resp:  [18] 18  BP: (122-146)/(55-89) 146/89  SpO2:  [97 %-98 %] 97 %    Intake/Output Summary (Last 24 hours) at 4/18/2024 0750  Last data filed at 4/17/2024 1923  Gross per 24 hour   Intake 0 ml   Output --   Net 0 ml             Lab Results: All Labs For Current Hospital Admission Reviewed     Current Facility-Administered Medications   Medication Dose Route Frequency Provider Last Rate    acetaminophen  650 mg Oral Q4H PRN Jordan C Holter,       acetaminophen  650 mg Oral Q6H PRN HOLLI Lion      aluminum-magnesium hydroxide-simethicone  30 mL Oral Q4H PRN Jordan C Holter,       amLODIPine  5 mg Oral Daily HOLLI Lion      Artificial Tears  1 drop Both Eyes Q3H PRN Jordan C Holter, DO      atropine  1 drop Sublingual HS HOLLI Lion      atropine  1 drop Sublingual Daily PRN HOLLI Lion      haloperidol lactate  2.5 mg Intramuscular Q4H PRN Max 4/day STEVE LionNP      And    LORazepam  1 mg Intramuscular Q4H PRN Max 4/day HOLLI Lion      And    benztropine  0.5 mg Intramuscular Q4H PRN Max 4/day HOLLI Lion      benztropine  1 mg Intramuscular Q4H PRN Max 6/day Jordan C Holter,       haloperidol lactate  5 mg Intramuscular Q4H PRN Max 4/day HOLLI Lion      And    LORazepam  2 mg Intramuscular Q4H PRN Max 4/day HOLLI Lion      And    benztropine  1 mg Intramuscular Q4H PRN Max 4/day HOLLI Lion      benztropine  1 mg Oral Q4H PRN Max 6/day HOLLI Lion      benztropine  1 mg Oral Q4H PRN Max 6/day Jordan C Holter,       bisacodyl  10 mg Rectal Daily PRN HOLLI Lion      cloZAPine  400 mg Oral HS Bora T Abraham, MD      cyanocobalamin  1,000 mcg Oral Daily HOLLI Galvan       hydrOXYzine HCL  50 mg Oral Q6H PRN Max 4/day [facility] Holter, DO      Or    diphenhydrAMINE  50 mg Intramuscular Q6H PRN [facility] Holter, DO      hydrOXYzine HCL  50 mg Oral Q6H PRN Max 4/day HOLLI Lion      Or    diphenhydrAMINE  50 mg Intramuscular Q6H PRN HOLLI Lion      diphenhydrAMINE-zinc acetate   Topical BID PRN HOLLI Lion      ergocalciferol  50,000 Units Oral Weekly HOLLI Galvan      escitalopram  20 mg Oral Daily HOLLI Lion      haloperidol  1 mg Oral Q6H PRN HOLLI Lion      haloperidol  2.5 mg Oral Q4H PRN Max 4/day HOLLI Lion      haloperidol  5 mg Oral Q4H PRN Max 4/day HOLLI Lion      hydrocortisone   Topical 4x Daily PRN HOLLI Lion      hydrOXYzine HCL  100 mg Oral Q6H PRN Max 4/day [facility] Holter, DO      Or    LORazepam  2 mg Intramuscular Q6H PRN [facility] Holter, DO      hydrOXYzine HCL  100 mg Oral Q6H PRN Max 4/day HOLLI Lion      Or    LORazepam  2 mg Intramuscular Q6H PRN HOLLI Lion      hydrOXYzine HCL  25 mg Oral Q6H PRN Max 4/day [facility] Holter, DO      ibuprofen  600 mg Oral Q8H PRN HOLLI Lion      melatonin  3 mg Oral HS [facility] Holter, DO      metFORMIN  500 mg Oral Daily With Breakfast HOLLI Lion      methocarbamol  500 mg Oral Q6H PRN HOLLI Lion      nicotine polacrilex  2 mg Oral Q2H PRN [facility] Holter, DO      OLANZapine  5 mg Oral Q4H PRN Max 3/day [facility] Holter, DO      Or    OLANZapine  2.5 mg Intramuscular Q4H PRN Max 3/day [facility] Holter, DO      OLANZapine  5 mg Oral Q3H PRN Max 3/day [facility] Holter, DO      Or    OLANZapine  5 mg Intramuscular Q3H PRN Max 3/day [facility] Holter, DO      OLANZapine  2.5 mg Oral Q4H PRN Max 6/day [facility] Holter, DO      ondansetron  4 mg Oral Q6H PRN HOLLI Lion      polyethylene glycol  17 g Oral Daily HOLLI Lion      polyethylene glycol  17 g Oral Daily PRN Jordan C Holter,       propranolol  10 mg Oral  Q12H [facility] HOLLI Lion      risperiDONE  2 mg Oral HS Bora Rosario MD      senna-docusate sodium  1 tablet Oral Daily PRN Jordan C Holter, DO      senna-docusate sodium  2 tablet Oral BID HOLLI Lion      traZODone  50 mg Oral HS PRN HOLLI Lion      white petrolatum-mineral oil   Topical TID PRN HOLLI Lion         Counseling / Coordination of Care: Total floor / unit time spent today 15 minutes. Greater than 50% of total time was spent with the patient and / or family counseling and / or somewhat receptive to supportive listening and teaching positive coping skills to deal with symptom mangement.     Patient's Rights, confidentiality and exceptions to confidentiality, use of automated medical record, Behavioral Health Services staff access to medical record, and consent to treatment reviewed.    This note has been dictated and hence there may be problems with punctuation, spelling and formatting and if anyone has any concerns please address them to Dr. Rosario   This note is not shared with patient due to potential for making patient's condition worse by knowing the content of the note.

## 2024-04-19 ENCOUNTER — ANESTHESIA EVENT (INPATIENT)
Dept: PREOP/PACU | Facility: HOSPITAL | Age: 29
End: 2024-04-19
Payer: COMMERCIAL

## 2024-04-19 ENCOUNTER — APPOINTMENT (INPATIENT)
Dept: PREOP/PACU | Facility: HOSPITAL | Age: 29
DRG: 750 | End: 2024-04-19
Attending: PSYCHIATRY & NEUROLOGY
Payer: COMMERCIAL

## 2024-04-19 ENCOUNTER — ANESTHESIA (INPATIENT)
Dept: PREOP/PACU | Facility: HOSPITAL | Age: 29
End: 2024-04-19
Payer: COMMERCIAL

## 2024-04-19 PROCEDURE — 90870 ELECTROCONVULSIVE THERAPY: CPT

## 2024-04-19 PROCEDURE — GZB2ZZZ ELECTROCONVULSIVE THERAPY, BILATERAL-SINGLE SEIZURE: ICD-10-PCS | Performed by: STUDENT IN AN ORGANIZED HEALTH CARE EDUCATION/TRAINING PROGRAM

## 2024-04-19 PROCEDURE — 90870 ELECTROCONVULSIVE THERAPY: CPT | Performed by: STUDENT IN AN ORGANIZED HEALTH CARE EDUCATION/TRAINING PROGRAM

## 2024-04-19 RX ORDER — KETOROLAC TROMETHAMINE 30 MG/ML
INJECTION, SOLUTION INTRAMUSCULAR; INTRAVENOUS AS NEEDED
Status: DISCONTINUED | OUTPATIENT
Start: 2024-04-19 | End: 2024-04-19

## 2024-04-19 RX ORDER — SODIUM CHLORIDE, SODIUM LACTATE, POTASSIUM CHLORIDE, CALCIUM CHLORIDE 600; 310; 30; 20 MG/100ML; MG/100ML; MG/100ML; MG/100ML
INJECTION, SOLUTION INTRAVENOUS CONTINUOUS PRN
Status: DISCONTINUED | OUTPATIENT
Start: 2024-04-19 | End: 2024-04-19

## 2024-04-19 RX ORDER — ETOMIDATE 2 MG/ML
INJECTION INTRAVENOUS AS NEEDED
Status: DISCONTINUED | OUTPATIENT
Start: 2024-04-19 | End: 2024-04-19

## 2024-04-19 RX ORDER — GLYCOPYRROLATE 0.2 MG/ML
INJECTION INTRAMUSCULAR; INTRAVENOUS AS NEEDED
Status: DISCONTINUED | OUTPATIENT
Start: 2024-04-19 | End: 2024-04-19

## 2024-04-19 RX ORDER — ESMOLOL HYDROCHLORIDE 10 MG/ML
INJECTION INTRAVENOUS AS NEEDED
Status: DISCONTINUED | OUTPATIENT
Start: 2024-04-19 | End: 2024-04-19

## 2024-04-19 RX ORDER — SUCCINYLCHOLINE/SOD CL,ISO/PF 100 MG/5ML
SYRINGE (ML) INTRAVENOUS AS NEEDED
Status: DISCONTINUED | OUTPATIENT
Start: 2024-04-19 | End: 2024-04-19

## 2024-04-19 RX ORDER — HYDRALAZINE HYDROCHLORIDE 20 MG/ML
INJECTION INTRAMUSCULAR; INTRAVENOUS AS NEEDED
Status: DISCONTINUED | OUTPATIENT
Start: 2024-04-19 | End: 2024-04-19

## 2024-04-19 RX ORDER — LABETALOL HYDROCHLORIDE 5 MG/ML
INJECTION, SOLUTION INTRAVENOUS AS NEEDED
Status: DISCONTINUED | OUTPATIENT
Start: 2024-04-19 | End: 2024-04-19

## 2024-04-19 RX ADMIN — HYDRALAZINE HYDROCHLORIDE 5 MG: 20 INJECTION INTRAMUSCULAR; INTRAVENOUS at 07:04

## 2024-04-19 RX ADMIN — SENNOSIDES AND DOCUSATE SODIUM 2 TABLET: 8.6; 5 TABLET ORAL at 17:13

## 2024-04-19 RX ADMIN — CYANOCOBALAMIN TAB 1000 MCG 1000 MCG: 1000 TAB at 08:49

## 2024-04-19 RX ADMIN — ETOMIDATE INJECTION 20 MG: 2 SOLUTION INTRAVENOUS at 06:57

## 2024-04-19 RX ADMIN — HYDRALAZINE HYDROCHLORIDE 5 MG: 20 INJECTION INTRAMUSCULAR; INTRAVENOUS at 07:07

## 2024-04-19 RX ADMIN — RISPERIDONE 2 MG: 2 TABLET, FILM COATED ORAL at 21:20

## 2024-04-19 RX ADMIN — NICOTINE POLACRILEX 2 MG: 2 GUM, CHEWING BUCCAL at 15:50

## 2024-04-19 RX ADMIN — CLOZAPINE 400 MG: 200 TABLET ORAL at 21:20

## 2024-04-19 RX ADMIN — POLYETHYLENE GLYCOL 3350 17 G: 17 POWDER, FOR SOLUTION ORAL at 08:50

## 2024-04-19 RX ADMIN — GLYCOPYRROLATE 0.2 MG: 0.2 INJECTION INTRAMUSCULAR; INTRAVENOUS at 06:46

## 2024-04-19 RX ADMIN — KETOROLAC TROMETHAMINE 30 MG: 30 INJECTION, SOLUTION INTRAMUSCULAR; INTRAVENOUS at 06:46

## 2024-04-19 RX ADMIN — HYDRALAZINE HYDROCHLORIDE 10 MG: 20 INJECTION INTRAMUSCULAR; INTRAVENOUS at 07:11

## 2024-04-19 RX ADMIN — Medication 140 MG: at 06:58

## 2024-04-19 RX ADMIN — GLYCOPYRROLATE 0.2 MG: 0.2 INJECTION INTRAMUSCULAR; INTRAVENOUS at 07:00

## 2024-04-19 RX ADMIN — PROPRANOLOL HYDROCHLORIDE 10 MG: 10 TABLET ORAL at 21:20

## 2024-04-19 RX ADMIN — SODIUM CHLORIDE, SODIUM LACTATE, POTASSIUM CHLORIDE, AND CALCIUM CHLORIDE: .6; .31; .03; .02 INJECTION, SOLUTION INTRAVENOUS at 06:33

## 2024-04-19 RX ADMIN — NICOTINE POLACRILEX 2 MG: 2 GUM, CHEWING BUCCAL at 17:53

## 2024-04-19 RX ADMIN — ATROPINE SULFATE 1 DROP: 10 SOLUTION/ DROPS OPHTHALMIC at 21:20

## 2024-04-19 RX ADMIN — NICOTINE POLACRILEX 2 MG: 2 GUM, CHEWING BUCCAL at 21:20

## 2024-04-19 RX ADMIN — AMLODIPINE BESYLATE 5 MG: 5 TABLET ORAL at 05:29

## 2024-04-19 RX ADMIN — MELATONIN TAB 3 MG 3 MG: 3 TAB at 21:20

## 2024-04-19 RX ADMIN — ESMOLOL HYDROCHLORIDE 20 MG: 100 INJECTION, SOLUTION INTRAVENOUS at 07:13

## 2024-04-19 RX ADMIN — LABETALOL 20 MG/4 ML (5 MG/ML) INTRAVENOUS SYRINGE 5 MG: at 07:16

## 2024-04-19 RX ADMIN — METFORMIN HYDROCHLORIDE 500 MG: 500 TABLET ORAL at 08:49

## 2024-04-19 RX ADMIN — SENNOSIDES AND DOCUSATE SODIUM 2 TABLET: 8.6; 5 TABLET ORAL at 08:49

## 2024-04-19 RX ADMIN — PROPRANOLOL HYDROCHLORIDE 10 MG: 10 TABLET ORAL at 05:30

## 2024-04-19 RX ADMIN — ESMOLOL HYDROCHLORIDE 30 MG: 100 INJECTION, SOLUTION INTRAVENOUS at 07:12

## 2024-04-19 RX ADMIN — NICOTINE POLACRILEX 2 MG: 2 GUM, CHEWING BUCCAL at 08:50

## 2024-04-19 RX ADMIN — ESCITALOPRAM OXALATE 20 MG: 10 TABLET, FILM COATED ORAL at 08:50

## 2024-04-19 NOTE — PROGRESS NOTES
Psychiatry Progress Note [facility]    Alberto Berumen 28 y.o. male MRN: [id number]  Unit/Bed#: [facility] 101-02 Encounter: [id number]  Code Status: Level 1 - Full Code    PCP: Joce Juan MD    Date of Admission:  3/29/2024 2008   Date of Service:  04/19/24    Patient Active Problem List   Diagnosis    GERD (gastroesophageal reflux disease)    Medical clearance for psychiatric admission    Schizoaffective disorder, bipolar type (HCC)    Tobacco abuse    T wave inversion in EKG    Syringoma    Chronic idiopathic constipation    Vitamin B 12 deficiency    Vitamin D deficiency    Confluent and reticulate papillomatosis    Class 2 obesity in adult    Primary hypertension    Elevated hemoglobin A1c     Review of systems: Unremarkable  Diagnosis: Schizoaffective bipolar    Assessment  Overall Status: Patient had weekly maintenance ECT today which he tolerated well.  Still with same voices that are threatening to kill him and his family and still feels paranoid but selectively interacting with some peers and at times punches the punching bag to cope but not aggressive or agitated or threatening or self-abusive with no need for behavioral PRNs   certification Statement: The patient will continue to require additional inpatient hospital stay due to ongoing voices that are threatening to kill him and his family lack of response to medications and ECT so far.     Medications: Clozapine 400 mg at bedtime, propranolol 10 mg every 12 hours as as needed for anxiety, Risperdal 2 mg at bedtime Lexapro 20 mg once a day, atropine eyedrops sublingual for drooling of saliva and senna 2 tablets twice a day for constipation  All medications were reviewed and are recommended to be continued for symptom management side effects from treatment: None reported  Medication changes   None today   Medication education   Risks side effects benefits and precautions of medications discussed with patient and he did verbalize an  understanding about risks for metabolic syndrome from being on neuroleptics and is form tardive dyskinesia etc. and special precautions about being on clozapine   Understanding of medications: Has some understanding   Justification for dual anti-psychotics: Not applicable    Non-pharmacological treatments  Continue with individual, group, milieu and occupational therapy using recovery principles and psycho-education about accepting illness and the need for treatment.   to contact aunt and explore ACT team referral which he never had  ECT to be continued  weekly for maintenance starting on 4/12/2024    Safety  Safety and communication plan established to target dynamic risk factors discussed above.    Discharge Plan   Back to his aunt with an ACT team    Interval Progress   Patient had his maintenance weekly ECT today as scheduled.  Still claims to hear the same threatening voices that tell him that they are going to kill him and his family.  However he believes these issues have helped him so that he does not have to live in his head that much..  Not aggressive or agitated or threatening or self-abusive and remains clean shaven with hair and dreadlocks.  Staying to himself somewhat withdrawn with some social isolation selectively interacting with certain peers.  No behavioral outbursts or behavioral PRNs needed in the last 24 hours.  Stays in touch with his sister and his aunt and is usually polite friendly pleasant and bright when approached.  Usually found sleeping in bed in the mornings but readily gets up when approached.  He says he is learning skills to tune out the voices which he has been hearing for more than 2 years.   Acceptance by patient: Accepting  Hopefulness in recovery: Living back with his aunt  Involved in reintegration process: Talking with his aunt and sister  Trusting in relationship with psychiatrist: Trusting  Sleep: Good  Appetite: Good  Compliance with Medications:  Compliant  Group attendance: Attending some 6/11  Significant events: No significant changes but did receive maintenance weekly ECT today    Mental Status Exam  Appearance: age appropriate, improved grooming, looks older than stated age, overweight, with hair in dreadlocks casually dressed with a clean shaven face, fairly groomed with good eye contact found laying in bed in his room under the covers clean-shaven easily awakened and recovering from the ECT  behavior: cooperative, mildly anxious, evasive, gesturing, slow responses continues to remain preoccupied   speech: normal rate, normal volume, normal pitch  Mood: dysphoric, anxious, reports he feels good continues to be anxious and paranoid  Affect: constricted, inappropriate, mood-congruent  Thought Process: organized, logical, coherent, goal directed, linear, decreased rate of thoughts, slowing of thoughts, negative thinking, impaired abstract reasoning, concrete  Thought Content: paranoid ideation, some paranoia, grandiose ideas, intrusive thoughts, preoccupied, chronic, continues to report paranoia about people threatening to kill him and his family because of the voices.  He believes ECT has helped so that he is not much in his head.  No other delusions elicited.  No current suicidal or homicidal thoughts and no plans verbalized.  No phobias obsessions compulsions or distorted body perceptions reported.   Perceptual Disturbances: auditory hallucinations, appears preoccupied, appears responding to internal stimuli, not observed, reports voices tell him that he and his family are going to be killed sometimes from 1 person on more than one person talking to him most of the day claiming he has not seen much difference other than a little bit subdued with the ECT.  .  Hx Risk Factors: chronic psychiatric problems, chronic anxiety symptoms, chronic psychotic symptoms, his aunt possibly moving away  Sensorium: Oriented x 3 spheres and situation  Cognition: recent  and remote memory grossly intact  Consciousness: alert and awake  Attention: attention span and concentration are age appropriate  Intellect: appears to be of average intelligence  Insight: intact  Judgement: intact  Motor Activity: no abnormal movements     Vitals  Temp:  [96.7 °F (35.9 °C)-97.8 °F (36.6 °C)] 97.8 °F (36.6 °C)  HR:  [] 88  Resp:  [16-20] 18  BP: (131-161)/(66-93) 143/71  SpO2:  [94 %-100 %] 97 %    Intake/Output Summary (Last 24 hours) at 4/19/2024 0823  Last data filed at 4/19/2024 0717  Gross per 24 hour   Intake 500 ml   Output --   Net 500 ml             Lab Results: All Labs For Current Hospital Admission Reviewed     Current Facility-Administered Medications   Medication Dose Route Frequency Provider Last Rate    acetaminophen  650 mg Oral Q4H PRN Jordan C Holter,       acetaminophen  650 mg Oral Q6H PRN HOLLI Lion      aluminum-magnesium hydroxide-simethicone  30 mL Oral Q4H PRN Jordan C Holter, DO      amLODIPine  5 mg Oral Daily STEVE LionNP      Artificial Tears  1 drop Both Eyes Q3H PRN Jordan C Holter, DO      atropine  1 drop Sublingual HS HOLLI Lion      atropine  1 drop Sublingual Daily PRN HOLLI Lion      haloperidol lactate  2.5 mg Intramuscular Q4H PRN Max 4/day STEVE LionNP      And    LORazepam  1 mg Intramuscular Q4H PRN Max 4/day STEVE LionNP      And    benztropine  0.5 mg Intramuscular Q4H PRN Max 4/day STEVE LionNP      benztropine  1 mg Intramuscular Q4H PRN Max 6/day Jordan C Holter, DO      haloperidol lactate  5 mg Intramuscular Q4H PRN Max 4/day HOLLI Lion      And    LORazepam  2 mg Intramuscular Q4H PRN Max 4/day STEVE LionNP      And    benztropine  1 mg Intramuscular Q4H PRN Max 4/day HOLLI Lion      benztropine  1 mg Oral Q4H PRN Max 6/day STEVE LionNP      benztropine  1 mg Oral Q4H PRN Max 6/day Jordan C Holter, DO      bisacodyl  10 mg Rectal Daily PRN STEVE LionNP       cloZAPine  400 mg Oral HS Bora Rosario MD      cyanocobalamin  1,000 mcg Oral Daily HOLLI Galvan      hydrOXYzine HCL  50 mg Oral Q6H PRN Max 4/day [facility] Holter, DO      Or    diphenhydrAMINE  50 mg Intramuscular Q6H PRN [facility] Holter, DO      hydrOXYzine HCL  50 mg Oral Q6H PRN Max 4/day HOLLI Lion      Or    diphenhydrAMINE  50 mg Intramuscular Q6H PRN HOLLI Lion      diphenhydrAMINE-zinc acetate   Topical BID PRN HOLLI Lion      ergocalciferol  50,000 Units Oral Weekly HOLLI Galvan      escitalopram  20 mg Oral Daily HOLLI Lion      haloperidol  1 mg Oral Q6H PRN HOLLI Lion      haloperidol  2.5 mg Oral Q4H PRN Max 4/day HOLLI Lion      haloperidol  5 mg Oral Q4H PRN Max 4/day HOLLI Lion      hydrocortisone   Topical 4x Daily PRN HOLLI Lion      hydrOXYzine HCL  100 mg Oral Q6H PRN Max 4/day [facility] Holter, DO      Or    LORazepam  2 mg Intramuscular Q6H PRN [facility] Holter, DO      hydrOXYzine HCL  100 mg Oral Q6H PRN Max 4/day HOLLI Lion      Or    LORazepam  2 mg Intramuscular Q6H PRN HOLLI Lion      hydrOXYzine HCL  25 mg Oral Q6H PRN Max 4/day [facility] Holter, DO      ibuprofen  600 mg Oral Q8H PRN HOLLI Lion      melatonin  3 mg Oral HS [facility] Holter, DO      metFORMIN  500 mg Oral Daily With Breakfast HOLLI Lion      methocarbamol  500 mg Oral Q6H PRN HOLLI Lion      nicotine polacrilex  2 mg Oral Q2H PRN [facility] Holter, DO      OLANZapine  5 mg Oral Q4H PRN Max 3/day [facility] Holter, DO      Or    OLANZapine  2.5 mg Intramuscular Q4H PRN Max 3/day [facility] Holter, DO      OLANZapine  5 mg Oral Q3H PRN Max 3/day [facility] Holter, DO      Or    OLANZapine  5 mg Intramuscular Q3H PRN Max 3/day [facility] Holter, DO      OLANZapine  2.5 mg Oral Q4H PRN Max 6/day Jordan C Holter,       ondansetron  4 mg Oral Q6H PRN HOLLI Lion      polyethylene glycol  17 g Oral  Daily HOLLI Lion      polyethylene glycol  17 g Oral Daily PRN Jordan C Holter,       propranolol  10 mg Oral Q12H KAYLIE HOLLI Lion      risperiDONE  2 mg Oral HS Bora Rosario MD      senna-docusate sodium  1 tablet Oral Daily PRN Jordan C Holter, DO      senna-docusate sodium  2 tablet Oral BID HOLLI Lion      traZODone  50 mg Oral HS PRN HOLLI Lion      white petrolatum-mineral oil   Topical TID PRN HOLLI Lion         Counseling / Coordination of Care: Total floor / unit time spent today 15 minutes. Greater than 50% of total time was spent with the patient and / or family counseling and / or somewhat receptive to supportive listening and teaching positive coping skills to deal with symptom mangement.     Patient's Rights, confidentiality and exceptions to confidentiality, use of automated medical record, Behavioral Health Services staff access to medical record, and consent to treatment reviewed.    This note has been dictated and hence there may be problems with punctuation, spelling and formatting and if anyone has any concerns please address them to Dr. Rosario   This note is not shared with patient due to potential for making patient's condition worse by knowing the content of the note.

## 2024-04-19 NOTE — PLAN OF CARE
Problem: Alteration in Thoughts and Perception  Goal: Verbalize thoughts and feelings  Description: Interventions:  - Promote a nonjudgmental and trusting relationship with the patient through active listening and therapeutic communication  - Assess patient's level of functioning, behavior and potential for risk  - Engage patient in 1 on 1 interactions  - Encourage patient to express fears, feelings, frustrations, and discuss symptoms    - [place] patient to reality, help patient recognize reality-based thinking   - Administer medications as ordered and assess for potential side effects  - Provide the patient education related to the signs and symptoms of the illness and desired effects of prescribed medications  Outcome: Progressing  Goal: Refrain from acting on delusional thinking/internal stimuli  Description: Interventions:  - Monitor patient closely, per order   - Utilize least restrictive measures   - Set reasonable limits, give positive feedback for acceptable   - Administer medications as ordered and monitor of potential side effects  Outcome: Progressing  Goal: Agree to be compliant with medication regime, as prescribed and report medication side effects  Description: Interventions:  - Offer appropriate PRN medication and supervise ingestion; conduct AIMS, as needed   Outcome: Progressing  Goal: Attend and participate in unit activities, including therapeutic, recreational, and educational groups  Description: Interventions:  -Encourage Visitation and family involvement in care  Outcome: Progressing  Goal: Recognize dysfunctional thoughts, communicate reality-based thoughts at the time of discharge  Description: Interventions:  - Provide medication and psycho-education to assist patient in compliance and developing insight into his/her illness   Outcome: Progressing  Goal: Complete daily ADLs, including personal hygiene independently, as able  Description: Interventions:  - Observe, teach, and assist  patient with ADLS  - Monitor and promote a balance of rest/activity, with adequate nutrition and elimination   Outcome: Progressing     Problem: Ineffective Coping  Goal: Identifies ineffective coping skills  Outcome: Progressing  Goal: Identifies healthy coping skills  Outcome: Progressing  Goal: Demonstrates healthy coping skills  Outcome: Progressing  Goal: Participates in unit activities  Description: Interventions:  - Provide therapeutic environment   - Provide required programming   - Redirect inappropriate behaviors   Outcome: Progressing     Problem: Depression  Goal: Treatment Goal: Demonstrate behavioral control of depressive symptoms, verbalize feelings of improved mood/affect, and adopt new coping skills prior to discharge  Outcome: Progressing  Goal: Verbalize thoughts and feelings  Description: Interventions:  - Assess and re-assess patient's level of risk   - Engage patient in 1:1 interactions, daily, for a minimum of 15 minutes   - Encourage patient to express feelings, fears, frustrations, hopes   Outcome: Progressing  Goal: Refrain from harming self  Description: Interventions:  - Monitor patient closely, per order   - Supervise medication ingestion, monitor effects and side effects   Outcome: Progressing  Goal: Refrain from isolation  Description: Interventions:  - Develop a trusting relationship   - Encourage socialization   Outcome: Progressing  Goal: Refrain from self-neglect  Outcome: Progressing  Goal: Attend and participate in unit activities, including therapeutic, recreational, and educational groups  Description: Interventions:  - Provide therapeutic and educational activities daily, encourage attendance and participation, and document same in the medical record   Outcome: Progressing  Goal: Complete daily ADLs, including personal hygiene independently, as able  Description: Interventions:  - Observe, teach, and assist patient with ADLS  -  Monitor and promote a balance of rest/activity,  with adequate nutrition and elimination   Outcome: Progressing     Problem: Anxiety  Goal: Anxiety is at manageable level  Description: Interventions:  - Assess and monitor patient's anxiety level.   - Monitor for signs and symptoms (heart palpitations, chest pain, shortness of breath, headaches, nausea, feeling jumpy, restlessness, irritable, apprehensive).   - Collaborate with interdisciplinary team and initiate plan and interventions as ordered.  - [place] patient to unit/surroundings  - Explain treatment plan  - Encourage participation in care  - Encourage verbalization of concerns/fears  - Identify coping mechanisms  - Assist in developing anxiety-reducing skills  - Administer/offer alternative therapies  - Limit or eliminate stimulants  Outcome: Progressing     Problem: Alteration in Thoughts and Perception  Goal: Treatment Goal: Gain control of psychotic behaviors/thinking, reduce/eliminate presenting symptoms and demonstrate improved reality functioning upon discharge  Outcome: Not Progressing

## 2024-04-19 NOTE — NURSING NOTE
Pt is calm, cooperative and visible on unit. Pt continues to report depression and AH that threatens to kill him and his family; denies all other psych signs and symptoms. Took medications without difficulty. Received nicorette gum @ 1550. Pt offers no complaints at this time. Will maintain q7 min checks.

## 2024-04-19 NOTE — PLAN OF CARE
Problem: Ineffective Coping  Goal: Identifies ineffective coping skills  Outcome: Progressing  Goal: Identifies healthy coping skills  Outcome: Progressing  Goal: Demonstrates healthy coping skills  Outcome: Progressing  Goal: Participates in unit activities  Description: Interventions:  - Provide therapeutic environment   - Provide required programming   - Redirect inappropriate behaviors   Outcome: Jasmina   Alberto has been attending and participating in groups.

## 2024-04-19 NOTE — PROGRESS NOTES
04/19/24 0713   Team Meeting   Meeting Type Daily Rounds   Team Members Present   Team Members Present Physician;Nurse;;Other (Discipline and Name)   Patient/Family Present   Patient Present No   Patient's Family Present No     In attendance:  MD Eveline Gamino, HOLLI Lucas, MIGUEL Alvarado, [facility]  Carla Lux, W  MATILDA Daniel.S.    Groups: 6/11    Pt reports ongoing auditory hallucinations, denies other symptoms. Pt expressed feeling annoyed due to peers arguing. Pt completed scheduled ECT this morning.

## 2024-04-19 NOTE — NURSING NOTE
"Pt is accepting of medications without incidence and meal compliant, but refused lunch. Pt has been resting in bed since ECT this morning, no complaints of pain, confusion or other issues. Polite and pleasant in conversation and reports AH remain \"the same, but not as bad\". Pt is social with select peers when out of room, and offers no new concerns.   "

## 2024-04-19 NOTE — PROCEDURES
"Procedure Note - ECT  Alberto Berumen 28 y.o. male MRN: [id number]    Time out was taken with staff to confirm correct patient and correct procedure to be performed.  Patient appeared a bit brighter, more interactive.  He stated that ECT treatments were going \"pretty well\".  He reports feeling \"less in his head\".  Still endorsing some auditory hallucinations although able to report they are not as intense as previous.  He is tolerating treatments well, experiencing minor short-term memory issues though reports that this improves gradually as the day goes on.  He agrees to proceed with the treatments today.    Session Number: 16    Diagnosis: Principal Problem:    Schizoaffective disorder, bipolar type ([facility])  Active Problems:    GERD (gastroesophageal reflux disease)    Medical clearance for psychiatric admission    Tobacco abuse    T wave inversion in EKG    Chronic idiopathic constipation    Confluent and reticulate papillomatosis    Primary hypertension    Elevated hemoglobin A1c      ECT Type: Inpatient, Acute    Anesthesia: Etomidate :    Electrode Placement: Bilateral    Energy level:  100 %      Seizure Duration     EE Sec.    EMG : 20 Sec    Post-ictal Suppression Index: N/A %    Results:Clinical seizure was satisfactory, Patient tolerated ECT well    Vitals:    24 0730   BP: 148/81   Pulse: 98   Resp: 17   Temp:    SpO2: 94%        Medication Administration - last 24 hours from 2024 0733 to 2024 0733         Date/Time Order Dose Route Action Action by     2024 0529 EDT amLODIPine (NORVASC) tablet 5 mg 5 mg Oral Given Arin Travis RN     2024 0839 EDT amLODIPine (NORVASC) tablet 5 mg 0 mg Oral Hold Arin Tarvis RN     2024 2114 EDT atropine (ISOPTO ATROPINE) 1 % ophthalmic solution 1 drop 1 drop Sublingual Given Paolo Smalls RN     2024 0838 EDT escitalopram (LEXAPRO) tablet 20 mg 20 mg Oral Given Arin Travis RN     2024 0838 EDT metFORMIN " (GLUCOPHAGE) tablet 500 mg 500 mg Oral Given Arin Travis RN     04/18/2024 0839 EDT polyethylene glycol (MIRALAX) packet 17 g 17 g Oral Given Arin Travis RN     04/19/2024 0530 EDT propranolol (INDERAL) tablet 10 mg 10 mg Oral Given Arin Travis RN     04/18/2024 2114 EDT propranolol (INDERAL) tablet 10 mg 10 mg Oral Given Paolo Smalls RN     04/18/2024 0838 EDT propranolol (INDERAL) tablet 10 mg 10 mg Oral Given Arin Travis RN     04/18/2024 1719 EDT senna-docusate sodium (SENOKOT S) 8.6-50 mg per tablet 2 tablet 2 tablet Oral Given Paolo Smalls RN     04/18/2024 0838 EDT senna-docusate sodium (SENOKOT S) 8.6-50 mg per tablet 2 tablet 2 tablet Oral Given Arin Travis RN     04/18/2024 2114 EDT melatonin tablet 3 mg 3 mg Oral Given Paolo Smalls, MIGUEL     04/18/2024 2126 EDT nicotine polacrilex (NICORETTE) gum 2 mg 2 mg Oral Given Mare Berry, MIGUEL     04/18/2024 1857 EDT nicotine polacrilex (NICORETTE) gum 2 mg 2 mg Oral Given Paolo Smalls RN     04/18/2024 1437 EDT nicotine polacrilex (NICORETTE) gum 2 mg 2 mg Oral Given Arin Travis RN     04/18/2024 1237 EDT nicotine polacrilex (NICORETTE) gum 2 mg 2 mg Oral Given Arin Travis RN     04/18/2024 0839 EDT nicotine polacrilex (NICORETTE) gum 2 mg 2 mg Oral Given Arin Travis RN     04/18/2024 0838 EDT cyanocobalamin (VITAMIN B-12) tablet 1,000 mcg 1,000 mcg Oral Given Arin Travis RN     04/18/2024 2114 EDT risperiDONE (RisperDAL) tablet 2 mg 2 mg Oral Given Paolo Smalls RN     04/18/2024 2114 EDT clozapine (CLOZARIL) tablet 400 mg 400 mg Oral Given Paolo Smalls RN     04/19/2024 0732 EDT lactated ringers infusion 0 mL/kg/hr Intravenous Stopped Cynthia Montenegro RN     04/19/2024 0717 EDT lactated ringers infusion -- Intravenous Anesthesia Volume Adjustment Tiana Tapia CRNA     04/19/2024 0633 EDT lactated ringers infusion -- Intravenous New Bag Tiana Tapia CRNA

## 2024-04-19 NOTE — ANESTHESIA PREPROCEDURE EVALUATION
Procedure:  ELECTROCONVULSIVE THERAPY (ECT)    Relevant Problems   ANESTHESIA (within normal limits)      CARDIO   (+) Primary hypertension      GI/HEPATIC   (+) GERD (gastroesophageal reflux disease)      Behavioral Health   (+) Schizoaffective disorder, bipolar type (HCC)   (+) Tobacco abuse      FEN/Gastrointestinal   (+) Chronic idiopathic constipation      Other   (+) Class 2 obesity in adult   (+) T wave inversion in EKG        Physical Exam    Airway    Mallampati score: III  TM Distance: >3 FB  Neck ROM: full     Dental   No notable dental hx     Cardiovascular      Pulmonary      Other Findings        Anesthesia Plan  ASA Score- 3     Anesthesia Type- general with ASA Monitors.         Additional Monitors:     Airway Plan:            Plan Factors-Exercise tolerance (METS): >4 METS.    Chart reviewed.    Patient summary reviewed.    Patient is not a current smoker.  Patient did not smoke on day of surgery.            Induction- intravenous.    Postoperative Plan-     Informed Consent- Anesthetic plan and risks discussed with patient.  I personally reviewed this patient with the CRNA. Discussed and agreed on the Anesthesia Plan with the CRNA..

## 2024-04-19 NOTE — ANESTHESIA POSTPROCEDURE EVALUATION
Post-Op Assessment Note    CV Status:  Stable  Pain Score: 0    Pain management: adequate       Mental Status:  Sleepy and arousable   PONV Controlled:  None   Airway Patency:  Patent     Post Op Vitals Reviewed: Yes    No anethesia notable event occurred.    Staff: CRNA               BP   161/72   Temp      Pulse 113   Resp 14   SpO2 94

## 2024-04-20 PROCEDURE — 99232 SBSQ HOSP IP/OBS MODERATE 35: CPT | Performed by: PHYSICIAN ASSISTANT

## 2024-04-20 RX ADMIN — AMLODIPINE BESYLATE 5 MG: 5 TABLET ORAL at 08:52

## 2024-04-20 RX ADMIN — RISPERIDONE 2 MG: 2 TABLET, FILM COATED ORAL at 21:29

## 2024-04-20 RX ADMIN — CYANOCOBALAMIN TAB 1000 MCG 1000 MCG: 1000 TAB at 08:52

## 2024-04-20 RX ADMIN — POLYETHYLENE GLYCOL 3350 17 G: 17 POWDER, FOR SOLUTION ORAL at 08:52

## 2024-04-20 RX ADMIN — NICOTINE POLACRILEX 2 MG: 2 GUM, CHEWING BUCCAL at 14:44

## 2024-04-20 RX ADMIN — NICOTINE POLACRILEX 2 MG: 2 GUM, CHEWING BUCCAL at 09:24

## 2024-04-20 RX ADMIN — ESCITALOPRAM OXALATE 20 MG: 10 TABLET, FILM COATED ORAL at 08:52

## 2024-04-20 RX ADMIN — NICOTINE POLACRILEX 2 MG: 2 GUM, CHEWING BUCCAL at 17:22

## 2024-04-20 RX ADMIN — SENNOSIDES AND DOCUSATE SODIUM 2 TABLET: 8.6; 5 TABLET ORAL at 08:52

## 2024-04-20 RX ADMIN — SENNOSIDES AND DOCUSATE SODIUM 2 TABLET: 8.6; 5 TABLET ORAL at 17:07

## 2024-04-20 RX ADMIN — METFORMIN HYDROCHLORIDE 500 MG: 500 TABLET ORAL at 08:52

## 2024-04-20 RX ADMIN — NICOTINE POLACRILEX 2 MG: 2 GUM, CHEWING BUCCAL at 12:42

## 2024-04-20 RX ADMIN — ATROPINE SULFATE 1 DROP: 10 SOLUTION/ DROPS OPHTHALMIC at 21:51

## 2024-04-20 RX ADMIN — CLOZAPINE 400 MG: 200 TABLET ORAL at 21:28

## 2024-04-20 RX ADMIN — MELATONIN TAB 3 MG 3 MG: 3 TAB at 21:29

## 2024-04-20 RX ADMIN — PROPRANOLOL HYDROCHLORIDE 10 MG: 10 TABLET ORAL at 21:28

## 2024-04-20 RX ADMIN — PROPRANOLOL HYDROCHLORIDE 10 MG: 10 TABLET ORAL at 08:52

## 2024-04-20 RX ADMIN — NICOTINE POLACRILEX 2 MG: 2 GUM, CHEWING BUCCAL at 20:56

## 2024-04-20 NOTE — NURSING NOTE
Received pt in bed at change of shift with eyes closed; chest movement noted.  Continues the same thus this far as per q 7 min room checks.   Will continue to monitor behavior, sleeping pattern and any medical issues that may arise.

## 2024-04-20 NOTE — PLAN OF CARE
Problem: Alteration in Thoughts and Perception  Goal: Verbalize thoughts and feelings  Description: Interventions:  - Promote a nonjudgmental and trusting relationship with the patient through active listening and therapeutic communication  - Assess patient's level of functioning, behavior and potential for risk  - Engage patient in 1 on 1 interactions  - Encourage patient to express fears, feelings, frustrations, and discuss symptoms    - [place] patient to reality, help patient recognize reality-based thinking   - Administer medications as ordered and assess for potential side effects  - Provide the patient education related to the signs and symptoms of the illness and desired effects of prescribed medications  Outcome: Progressing  Goal: Refrain from acting on delusional thinking/internal stimuli  Description: Interventions:  - Monitor patient closely, per order   - Utilize least restrictive measures   - Set reasonable limits, give positive feedback for acceptable   - Administer medications as ordered and monitor of potential side effects  Outcome: Progressing  Goal: Agree to be compliant with medication regime, as prescribed and report medication side effects  Description: Interventions:  - Offer appropriate PRN medication and supervise ingestion; conduct AIMS, as needed   Outcome: Progressing  Goal: Complete daily ADLs, including personal hygiene independently, as able  Description: Interventions:  - Observe, teach, and assist patient with ADLS  - Monitor and promote a balance of rest/activity, with adequate nutrition and elimination   Outcome: Progressing     Problem: Ineffective Coping  Goal: Patient/Family participate in treatment and DC plans  Description: Interventions:  - Provide therapeutic environment  Outcome: Progressing     Problem: Depression  Goal: Treatment Goal: Demonstrate behavioral control of depressive symptoms, verbalize feelings of improved mood/affect, and adopt new coping skills prior to  discharge  Outcome: Progressing  Goal: Refrain from harming self  Description: Interventions:  - Monitor patient closely, per order   - Supervise medication ingestion, monitor effects and side effects   Outcome: Progressing  Goal: Refrain from isolation  Description: Interventions:  - Develop a trusting relationship   - Encourage socialization   Outcome: Progressing  Goal: Refrain from self-neglect  Outcome: Progressing  Goal: Complete daily ADLs, including personal hygiene independently, as able  Description: Interventions:  - Observe, teach, and assist patient with ADLS  -  Monitor and promote a balance of rest/activity, with adequate nutrition and elimination   Outcome: Progressing     Problem: Anxiety  Goal: Anxiety is at manageable level  Description: Interventions:  - Assess and monitor patient's anxiety level.   - Monitor for signs and symptoms (heart palpitations, chest pain, shortness of breath, headaches, nausea, feeling jumpy, restlessness, irritable, apprehensive).   - Collaborate with interdisciplinary team and initiate plan and interventions as ordered.  - [place] patient to unit/surroundings  - Explain treatment plan  - Encourage participation in care  - Encourage verbalization of concerns/fears  - Identify coping mechanisms  - Assist in developing anxiety-reducing skills  - Administer/offer alternative therapies  - Limit or eliminate stimulants  Outcome: Progressing     Problem: Alteration in Orientation  Goal: Cooperate with recommended testing/procedures  Description: Interventions:  - Determine need for ancillary testing  - Observe for mental status changes  - Implement falls/precaution protocol   Outcome: Progressing  Goal: Complete daily ADLs, including personal hygiene independently, as able  Description: Interventions:  - Observe, teach, and assist patient with ADLS  Outcome: Progressing     Problem: Electroconvulsive therapy (ECT)  Goal: Treatment Goal: Demonstrate a reduction of confusion and  improved orientation to person, place, time and/or situation upon discharge, according to optimum baseline/ability  Outcome: Progressing  Goal: Verbalizes/displays adequate comfort level or baseline comfort level  Description: Interventions:  - Encourage patient to monitor pain and request assistance  - Assess pain using appropriate pain scale  - Administer analgesics based on type and severity of pain and evaluate response  - Implement non-pharmacological measures as appropriate and evaluate response  - Consider cultural and social influences on pain and pain management  - Notify physician/advanced practitioner if interventions unsuccessful or patient reports new pain  Outcome: Progressing  Goal: Maintain or return mobility to safest level of function  Description: INTERVENTIONS:  - Assess patient's ability to carry out ADLs; assess patient's baseline for ADL function and identify physical deficits which impact ability to perform ADLs (bathing, care of mouth/teeth, toileting, grooming, dressing, etc.)  - Assess/evaluate cause of self-care deficits   - Assess range of motion  - Assess patient's mobility  - Assess patient's need for assistive devices and provide as appropriate  - Encourage maximum independence but intervene and supervise when necessary  - Involve family in performance of ADLs  - Assess for home care needs following discharge   - Consider OT consult to assist with ADL evaluation and planning for discharge  - Provide patient education as appropriate  Outcome: Progressing  Goal: Absence of urinary retention  Description: INTERVENTIONS:  - Assess patient’s ability to void and empty bladder  - Monitor I/O  - Bladder scan as needed  - Discuss with physician/AP medications to alleviate retention as needed  - Discuss catheterization for long term situations as appropriate  Outcome: Progressing  Goal: Minimal or absence of nausea and/or vomiting  Description: INTERVENTIONS:  - Administer IV fluids if ordered  to ensure adequate hydration  - Maintain NPO status until nausea and vomiting are resolved  - Nasogastric tube if ordered  - Administer ordered antiemetic medications as needed  - Provide nonpharmacologic comfort measures as appropriate  - Advance diet as tolerated, if ordered  - Consider nutrition services referral to assist patient with adequate nutrition and appropriate food choices  Outcome: Progressing  Goal: Maintains adequate nutritional intake  Description: INTERVENTIONS:  - Monitor percentage of each meal consumed  - Identify factors contributing to decreased intake, treat as appropriate  - Assist with meals as needed  - Monitor I&O, weight, and lab values if indicated  - Obtain nutrition services referral as needed  Outcome: Progressing     Problem: DISCHARGE PLANNING - CARE MANAGEMENT  Goal: Discharge to post-acute care or home with appropriate resources  Description: INTERVENTIONS:  - Conduct assessment to determine patient/family and health care team treatment goals, and need for post-acute services based on payer coverage, community resources, and patient preferences, and barriers to discharge  - Address psychosocial, clinical, and financial barriers to discharge as identified in assessment in conjunction with the patient/family and health care team  - Arrange appropriate level of post-acute services according to patient’s   needs and preference and payer coverage in collaboration with the physician and health care team  - Communicate with and update the patient/family, physician, and health care team regarding progress on the discharge plan  - Arrange appropriate transportation to post-acute venues  Outcome: Progressing     Problem: Alteration in Thoughts and Perception  Goal: Attend and participate in unit activities, including therapeutic, recreational, and educational groups  Description: Interventions:  -Encourage Visitation and family involvement in care  Outcome: Not Progressing     Problem:  Ineffective Coping  Goal: Participates in unit activities  Description: Interventions:  - Provide therapeutic environment   - Provide required programming   - Redirect inappropriate behaviors   Outcome: Not Progressing     Problem: Depression  Goal: Attend and participate in unit activities, including therapeutic, recreational, and educational groups  Description: Interventions:  - Provide therapeutic and educational activities daily, encourage attendance and participation, and document same in the medical record   Outcome: Not Progressing

## 2024-04-20 NOTE — NURSING NOTE
Patient isolative to self watching tv. Denies depression and anxiety. Admits to auditory hallucinations. Complaint with medications and meals.

## 2024-04-20 NOTE — PROGRESS NOTES
"This note was not shared with the patient due to reasonable likelihood of causing patient harm    Progress Note - Behavioral Health   Alberto Berumen 28 y.o. male MRN: [id number]  Unit/Bed#: [facility] 101-02 Encounter: [id number]    Assessment/Plan   Principal Problem:    Schizoaffective disorder, bipolar type (HCC)  Active Problems:    GERD (gastroesophageal reflux disease)    Medical clearance for psychiatric admission    Tobacco abuse    T wave inversion in EKG    Chronic idiopathic constipation    Confluent and reticulate papillomatosis    Primary hypertension    Elevated hemoglobin A1c      Progress Toward Goals: Unchanged.  No significant changes in behaviors or clinical status over the last 24 hours.  Alberto will continue working on current treatment goals which include:  1.Continue with group therapy, milieu therapy and occupational therapy  2.Behavioral Health checks every 7 minutes  3.Continue frequent safety checks and vitals per unit protocol  4.Continue with SLIM medical management as indicated  5.The patient will be maintained on the following medications:  6. Disposition Planning: Discharge planning and efforts remain ongoing per primary treatment teams recommendation    Psychiatric Review of Systems:  Behavior over the last 24 hours: Unchanged  Sleep: sleeping okay throughout the night  Appetite: adequate  Medication side effects: none reported  ROS:all other systems are negative    Patient was seen today for continuation of care, records reviewed and patient was discussed with the morning case review team.  No behavioral outbursts or acute events noted overnight and no significant changes in behaviors or clinical status over the last 24 hours.      Alberto was seen today while in bed and on unit. He reports that he is \"good\" and denies anxiety and depression. He denies SI/HI/VH but admits to  of voices saying they will kill him. He reports sleeping well and eating breakfast this AM. He is due for " clozapine levels on Monday per nursing. Nursing reports he is often more active in afternoon/evenings and that he is annoyed by his peers. Nursing reports he admits to command hallucinations threatening to kill himself and his family. Despite this he reports good mood and denies paranoia this AM. He is due for ECT on Friday.  Alberto does not appear overtly anxious or depressed.  Alberto denies suicidal ideation/intent/plan.  Alberto also denies HI/VH, does not voice any paranoia and delusional content, and does not appear overtly manic.  Alberto states that Alberto feels safe on the unit.  No medication changes indicated at this time, continue current medication regimen.    Vitals:  Vitals:    04/20/24 0755   BP: 124/68   Pulse: 98   Resp: 20   Temp: 98 °F (36.7 °C)   SpO2: 100%       Laboratory Results:  I have personally reviewed all pertinent laboratory/tests results.    Mental Status Evaluation:  Appearance:  adequate grooming, wearing hospital clothes, overweight   Behavior:  pleasant, cooperative, calm, interacts appropriately with this writer   Speech:  normal rate, normal volume, normal pitch   Mood:  euthymic   Affect:  blunted   Thought Process:  goal directed, linear   Associations: intact associations   Thought Content:  no overt delusions, no paranoia noted on exam   Perceptual Disturbances: auditory hallucinations with commands to kill self, denies visual hallucinations when asked   Risk Potential: Suicidal ideation - None at present  Homicidal ideation - None at present  Potential for aggression - Not at present   Sensorium:  oriented to person, place, and time/date   Memory:  recent and remote memory grossly intact   Consciousness:  alert and awake   Attention/Concentration: attention span and concentration are age appropriate   Insight:  age appropriate   Judgment: age appropriate   Gait/Station: normal gait/station   Motor Activity: no abnormal movements     Current Facility-Administered  Medications   Medication Dose Route Frequency Provider Last Rate    acetaminophen  650 mg Oral Q4H PRN Jordan C Holter, DO      acetaminophen  650 mg Oral Q6H PRN HOLLI Lion      aluminum-magnesium hydroxide-simethicone  30 mL Oral Q4H PRN Jordan C Holter, DO      amLODIPine  5 mg Oral Daily HOLLI Lion      Artificial Tears  1 drop Both Eyes Q3H PRN Jordan C Holter, DO      atropine  1 drop Sublingual HS HOLLI Lion      atropine  1 drop Sublingual Daily PRN HOLLI Lion      haloperidol lactate  2.5 mg Intramuscular Q4H PRN Max 4/day HOLLI Lion      And    LORazepam  1 mg Intramuscular Q4H PRN Max 4/day HOLLI Lion      And    benztropine  0.5 mg Intramuscular Q4H PRN Max 4/day HOLLI Lion      benztropine  1 mg Intramuscular Q4H PRN Max 6/day Jordan C Holter, DO      haloperidol lactate  5 mg Intramuscular Q4H PRN Max 4/day HOLLI Lion      And    LORazepam  2 mg Intramuscular Q4H PRN Max 4/day HOLLI Lion      And    benztropine  1 mg Intramuscular Q4H PRN Max 4/day HOLLI Lion      benztropine  1 mg Oral Q4H PRN Max 6/day HOLLI Lion      benztropine  1 mg Oral Q4H PRN Max 6/day Jordan C Holter, DO      bisacodyl  10 mg Rectal Daily PRN HOLLI Lion      cloZAPine  400 mg Oral HS Bora Rosario MD      cyanocobalamin  1,000 mcg Oral Daily HOLLI Galvan      hydrOXYzine HCL  50 mg Oral Q6H PRN Max 4/day Jordan C Holter, DO      Or    diphenhydrAMINE  50 mg Intramuscular Q6H PRN Jordan C Holter, DO      hydrOXYzine HCL  50 mg Oral Q6H PRN Max 4/day HOLLI Lion      Or    diphenhydrAMINE  50 mg Intramuscular Q6H PRN HOLLI Lion      diphenhydrAMINE-zinc acetate   Topical BID PRN HOLLI Lion      ergocalciferol  50,000 Units Oral Weekly HOLLI Galvan      escitalopram  20 mg Oral Daily HOLLI Lion      haloperidol  1 mg Oral Q6H PRN HOLLI Lion      haloperidol  2.5 mg Oral  Q4H PRN Max 4/day HOLLI Lion      haloperidol  5 mg Oral Q4H PRN Max 4/day HOLLI Lion      hydrocortisone   Topical 4x Daily PRN HOLLI Lion      hydrOXYzine HCL  100 mg Oral Q6H PRN Max 4/day [facility] Holter, DO      Or    LORazepam  2 mg Intramuscular Q6H PRN [facility] Holter, DO      hydrOXYzine HCL  100 mg Oral Q6H PRN Max 4/day HOLLI Lion      Or    LORazepam  2 mg Intramuscular Q6H PRN HOLLI Lion      hydrOXYzine HCL  25 mg Oral Q6H PRN Max 4/day [facility] Holter, DO      ibuprofen  600 mg Oral Q8H PRN HOLLI Lion      melatonin  3 mg Oral HS [facility] Holter, DO      metFORMIN  500 mg Oral Daily With Breakfast HOLLI Lion      methocarbamol  500 mg Oral Q6H PRN HOLLI Lion      nicotine polacrilex  2 mg Oral Q2H PRN [facility] Holter, DO      OLANZapine  5 mg Oral Q4H PRN Max 3/day [facility] Holter, DO      Or    OLANZapine  2.5 mg Intramuscular Q4H PRN Max 3/day [facility] Holter, DO      OLANZapine  5 mg Oral Q3H PRN Max 3/day [facility] Holter, DO      Or    OLANZapine  5 mg Intramuscular Q3H PRN Max 3/day [facility] Holter, DO      OLANZapine  2.5 mg Oral Q4H PRN Max 6/day [facility] Holter, DO      ondansetron  4 mg Oral Q6H PRN HOLLI Lion      polyethylene glycol  17 g Oral Daily HOLLI Lion      polyethylene glycol  17 g Oral Daily PRN [facility] Holter, DO      propranolol  10 mg Oral Q12H KAYLIE HOLLI Lion      risperiDONE  2 mg Oral HS Bora Rosario MD      senna-docusate sodium  1 tablet Oral Daily PRN [facility] Holter, DO      senna-docusate sodium  2 tablet Oral BID HOLLI Lion      traZODone  50 mg Oral HS PRN HOLLI Loin      white petrolatum-mineral oil   Topical TID PRN HOLLI Lion          Discharge Disposition: Discharge disposition and planning remain ongoing    Risks / Benefits of Treatment:  Risks, benefits, and possible side effects of medications explained to patient and patient verbalizes  understanding and agreement for treatment.    Counseling / Coordination of Care:  Total floor/unit time spent today 25 minutes. Greater than 50% of total time was spent with the patient and / or family counseling and / or coordination of care. A description of the counseling / coordination of care:   Patient's progress discussed with staff in treatment team meeting.  Medications, treatment progress and treatment plan reviewed with patient.   Educated on importance of medication and treatment compliance.  Reassurance and supportive therapy provided.   Encouraged participation in milieu and group therapy on the unit.    Axel Bowens PA-C

## 2024-04-21 PROCEDURE — 99232 SBSQ HOSP IP/OBS MODERATE 35: CPT | Performed by: PHYSICIAN ASSISTANT

## 2024-04-21 RX ADMIN — NICOTINE POLACRILEX 2 MG: 2 GUM, CHEWING BUCCAL at 17:48

## 2024-04-21 RX ADMIN — NICOTINE POLACRILEX 2 MG: 2 GUM, CHEWING BUCCAL at 13:20

## 2024-04-21 RX ADMIN — ESCITALOPRAM OXALATE 20 MG: 10 TABLET, FILM COATED ORAL at 09:12

## 2024-04-21 RX ADMIN — RISPERIDONE 2 MG: 2 TABLET, FILM COATED ORAL at 21:26

## 2024-04-21 RX ADMIN — ATROPINE SULFATE 1 DROP: 10 SOLUTION/ DROPS OPHTHALMIC at 22:00

## 2024-04-21 RX ADMIN — PROPRANOLOL HYDROCHLORIDE 10 MG: 10 TABLET ORAL at 21:26

## 2024-04-21 RX ADMIN — MELATONIN TAB 3 MG 3 MG: 3 TAB at 21:27

## 2024-04-21 RX ADMIN — NICOTINE POLACRILEX 2 MG: 2 GUM, CHEWING BUCCAL at 20:44

## 2024-04-21 RX ADMIN — AMLODIPINE BESYLATE 5 MG: 5 TABLET ORAL at 09:11

## 2024-04-21 RX ADMIN — CLOZAPINE 400 MG: 200 TABLET ORAL at 21:26

## 2024-04-21 RX ADMIN — SENNOSIDES AND DOCUSATE SODIUM 2 TABLET: 8.6; 5 TABLET ORAL at 17:46

## 2024-04-21 RX ADMIN — CYANOCOBALAMIN TAB 1000 MCG 1000 MCG: 1000 TAB at 09:12

## 2024-04-21 RX ADMIN — NICOTINE POLACRILEX 2 MG: 2 GUM, CHEWING BUCCAL at 15:41

## 2024-04-21 RX ADMIN — POLYETHYLENE GLYCOL 3350 17 G: 17 POWDER, FOR SOLUTION ORAL at 09:12

## 2024-04-21 RX ADMIN — SENNOSIDES AND DOCUSATE SODIUM 2 TABLET: 8.6; 5 TABLET ORAL at 09:12

## 2024-04-21 RX ADMIN — NICOTINE POLACRILEX 2 MG: 2 GUM, CHEWING BUCCAL at 09:12

## 2024-04-21 RX ADMIN — PROPRANOLOL HYDROCHLORIDE 10 MG: 10 TABLET ORAL at 09:12

## 2024-04-21 RX ADMIN — METFORMIN HYDROCHLORIDE 500 MG: 500 TABLET ORAL at 09:12

## 2024-04-21 NOTE — PROGRESS NOTES
"This note was not shared with the patient due to reasonable likelihood of causing patient harm    Progress Note - Behavioral Health   Alberto Berumen 28 y.o. male MRN: [id number]  Unit/Bed#: [facility] 101-02 Encounter: [id number]    Assessment/Plan   Principal Problem:    Schizoaffective disorder, bipolar type (HCC)  Active Problems:    GERD (gastroesophageal reflux disease)    Medical clearance for psychiatric admission    Tobacco abuse    T wave inversion in EKG    Chronic idiopathic constipation    Confluent and reticulate papillomatosis    Primary hypertension    Elevated hemoglobin A1c      Progress Toward Goals: Unchanged.  No significant changes in behaviors or clinical status over the last 24 hours.  Alberto will continue working on current treatment goals which include:  1.Continue with group therapy, milieu therapy and occupational therapy  2.Behavioral Health checks every 7 minutes  3.Continue frequent safety checks and vitals per unit protocol  4.Continue with SLIM medical management as indicated  5.The patient will be maintained on the following medications:  6. Disposition Planning: Discharge planning and efforts remain ongoing per primary treatment teams recommendation    Psychiatric Review of Systems:  Behavior over the last 24 hours: Unchanged  Sleep: sleeping okay throughout the night  Appetite: adequate  Medication side effects: none reported  ROS:all other systems are negative    Patient was seen today for continuation of care, records reviewed and patient was discussed with the morning case review team.  No behavioral outbursts or acute events noted overnight and no significant changes in behaviors or clinical status over the last 24 hours.      Alberto was seen today while in bed and on unit. He reports he is feeling \"a little down this morning but that's usual\". He denies current anxiety. He reports still hearing voices that are saying they will hurt his family. Despite this he reports the voices " "are less bothersome and he is more able to ignore them than previously. He reports eating and sleeping well. Denies SI/HI/VH. Denies any physical complaints. Nursing reports that he was seen socializing with his peers yesterday and was less isolative. Alberto does not appear overtly anxious or depressed.  Alberto denies suicidal ideation/intent/plan.  Alberto also denies HI/VH, does not voice any paranoia and delusional content, and does not appear overtly manic.  Alberto states that Alberto feels safe on the unit.  No medication changes indicated at this time, continue current medication regimen.    Vitals:  Vitals:    04/21/24 0757   BP: 129/72   Pulse: 89   Resp: 18   Temp: 97.8 °F (36.6 °C)   SpO2: 98%       Laboratory Results:  I have personally reviewed all pertinent laboratory/tests results.    Mental Status Evaluation:  Appearance:  adequate grooming, wearing hospital clothes, overweight   Behavior:  pleasant, cooperative, calm, interacts appropriately with this writer   Speech:  normal rate, normal volume, normal pitch   Mood:  euthymic   Affect:  blunted   Thought Process:  goal directed, linear   Associations: intact associations   Thought Content:  no overt delusions, no paranoia noted on exam   Perceptual Disturbances: auditory hallucinations of \"they will kill my family\", denies visual hallucinations when asked   Risk Potential: Suicidal ideation - None at present  Homicidal ideation - None at present  Potential for aggression - Not at present   Sensorium:  oriented to person, place, and time/date   Memory:  recent and remote memory grossly intact   Consciousness:  alert and awake   Attention/Concentration: attention span and concentration are age appropriate   Insight:  age appropriate   Judgment: age appropriate   Gait/Station: normal gait/station   Motor Activity: no abnormal movements     Current Facility-Administered Medications   Medication Dose Route Frequency Provider Last Rate    acetaminophen "  650 mg Oral Q4H PRN Ion FISCHER Holter, DO      acetaminophen  650 mg Oral Q6H PRN HOLLI Lion      aluminum-magnesium hydroxide-simethicone  30 mL Oral Q4H PRN Jordan C Holter, DO      amLODIPine  5 mg Oral Daily HOLLI Lion      Artificial Tears  1 drop Both Eyes Q3H PRN Jordan C Holter, DO      atropine  1 drop Sublingual HS HOLLI Lion      atropine  1 drop Sublingual Daily PRN HOLLI Lion      haloperidol lactate  2.5 mg Intramuscular Q4H PRN Max 4/day HOLLI Lion      And    LORazepam  1 mg Intramuscular Q4H PRN Max 4/day HOLLI Lion      And    benztropine  0.5 mg Intramuscular Q4H PRN Max 4/day HOLLI Lion      benztropine  1 mg Intramuscular Q4H PRN Max 6/day Jordan C Holter, DO      haloperidol lactate  5 mg Intramuscular Q4H PRN Max 4/day HOLLI Lion      And    LORazepam  2 mg Intramuscular Q4H PRN Max 4/day HOLLI Lion      And    benztropine  1 mg Intramuscular Q4H PRN Max 4/day HOLLI Lion      benztropine  1 mg Oral Q4H PRN Max 6/day HOLLI Lion      benztropine  1 mg Oral Q4H PRN Max 6/day Jordan C Holter, DO      bisacodyl  10 mg Rectal Daily PRN HOLLI Lion      cloZAPine  400 mg Oral HS Bora Rosario MD      cyanocobalamin  1,000 mcg Oral Daily HOLLI Galvan      hydrOXYzine HCL  50 mg Oral Q6H PRN Max 4/day Jordan C Holter, DO      Or    diphenhydrAMINE  50 mg Intramuscular Q6H PRN Jordan C Holter, DO      hydrOXYzine HCL  50 mg Oral Q6H PRN Max 4/day HOLLI Lion      Or    diphenhydrAMINE  50 mg Intramuscular Q6H PRN HOLLI Lion      diphenhydrAMINE-zinc acetate   Topical BID PRN HOLLI Lion      ergocalciferol  50,000 Units Oral Weekly HOLLI Galvan      escitalopram  20 mg Oral Daily Eveline Hangey, CRNP      haloperidol  1 mg Oral Q6H PRN HOLLI Lion      haloperidol  2.5 mg Oral Q4H PRN Max 4/day HOLLI Lion      haloperidol  5 mg Oral Q4H PRN Max 4/day  HOLLI Lion      hydrocortisone   Topical 4x Daily PRN HOLLI Lion      hydrOXYzine HCL  100 mg Oral Q6H PRN Max 4/day [facility] Holter, DO      Or    LORazepam  2 mg Intramuscular Q6H PRN [facility] Holter, DO      hydrOXYzine HCL  100 mg Oral Q6H PRN Max 4/day HOLLI Lion      Or    LORazepam  2 mg Intramuscular Q6H PRN HOLLI Lion      hydrOXYzine HCL  25 mg Oral Q6H PRN Max 4/day [facility] Holter, DO      ibuprofen  600 mg Oral Q8H PRN HOLLI Lion      melatonin  3 mg Oral HS [facility] Holter, DO      metFORMIN  500 mg Oral Daily With Breakfast HOLLI Lion      methocarbamol  500 mg Oral Q6H PRN HOLLI Lion      nicotine polacrilex  2 mg Oral Q2H PRN [facility] Holter, DO      OLANZapine  5 mg Oral Q4H PRN Max 3/day [facility] Holter, DO      Or    OLANZapine  2.5 mg Intramuscular Q4H PRN Max 3/day [facility] Holter, DO      OLANZapine  5 mg Oral Q3H PRN Max 3/day [facility] Holter, DO      Or    OLANZapine  5 mg Intramuscular Q3H PRN Max 3/day [facility] Holter, DO      OLANZapine  2.5 mg Oral Q4H PRN Max 6/day [facility] Holter, DO      ondansetron  4 mg Oral Q6H PRN HOLLI Lion      polyethylene glycol  17 g Oral Daily HOLLI Lion      polyethylene glycol  17 g Oral Daily PRN [facility] Holter, DO      propranolol  10 mg Oral Q12H KAYLIE HOLLI Lion      risperiDONE  2 mg Oral HS Bora Rosario MD      senna-docusate sodium  1 tablet Oral Daily PRN [facility] Holter, DO      senna-docusate sodium  2 tablet Oral BID HOLLI Lion      traZODone  50 mg Oral HS PRN HOLLI Lion      white petrolatum-mineral oil   Topical TID PRN HOLLI Lion          Discharge Disposition: Discharge disposition and planning remain ongoing    Risks / Benefits of Treatment:  Risks, benefits, and possible side effects of medications explained to patient and patient verbalizes understanding and agreement for treatment.    Counseling / Coordination of Care:  Total  floor/unit time spent today 25 minutes. Greater than 50% of total time was spent with the patient and / or family counseling and / or coordination of care. A description of the counseling / coordination of care:   Patient's progress discussed with staff in treatment team meeting.  Medications, treatment progress and treatment plan reviewed with patient.   Educated on importance of medication and treatment compliance.  Reassurance and supportive therapy provided.   Encouraged participation in milieu and group therapy on the unit.    Axel Bowens PA-C

## 2024-04-21 NOTE — NURSING NOTE
Pt napping throughout the day up for meals. Pt is currently alert, cooperative and visible intermittently. No SI or HI noted. Denies depression, anxiety and pain. Did not attend any group. Consumed 100% of breakfast and 100% of lunch. Took all medication without prompting. Maintained on safe precautions without incident. Will continue to monitor progress and recovery program.

## 2024-04-21 NOTE — PLAN OF CARE
Problem: Alteration in Thoughts and Perception  Goal: Treatment Goal: Gain control of psychotic behaviors/thinking, reduce/eliminate presenting symptoms and demonstrate improved reality functioning upon discharge  Outcome: Progressing  Goal: Verbalize thoughts and feelings  Description: Interventions:  - Promote a nonjudgmental and trusting relationship with the patient through active listening and therapeutic communication  - Assess patient's level of functioning, behavior and potential for risk  - Engage patient in 1 on 1 interactions  - Encourage patient to express fears, feelings, frustrations, and discuss symptoms    - Bryan patient to reality, help patient recognize reality-based thinking   - Administer medications as ordered and assess for potential side effects  - Provide the patient education related to the signs and symptoms of the illness and desired effects of prescribed medications  Outcome: Not Progressing  Goal: Refrain from acting on delusional thinking/internal stimuli  Description: Interventions:  - Monitor patient closely, per order   - Utilize least restrictive measures   - Set reasonable limits, give positive feedback for acceptable   - Administer medications as ordered and monitor of potential side effects  Outcome: Progressing  Goal: Agree to be compliant with medication regime, as prescribed and report medication side effects  Description: Interventions:  - Offer appropriate PRN medication and supervise ingestion; conduct AIMS, as needed   Outcome: Progressing  Goal: Attend and participate in unit activities, including therapeutic, recreational, and educational groups  Description: Interventions:  -Encourage Visitation and family involvement in care  Outcome: Progressing  Goal: Recognize dysfunctional thoughts, communicate reality-based thoughts at the time of discharge  Description: Interventions:  - Provide medication and psycho-education to assist patient in compliance and developing  insight into his/her illness   Outcome: Not Progressing  Goal: Complete daily ADLs, including personal hygiene independently, as able  Description: Interventions:  - Observe, teach, and assist patient with ADLS  - Monitor and promote a balance of rest/activity, with adequate nutrition and elimination   Outcome: Progressing     Problem: Ineffective Coping  Goal: Identifies ineffective coping skills  Outcome: Progressing     Problem: Depression  Goal: Refrain from harming self  Description: Interventions:  - Monitor patient closely, per order   - Supervise medication ingestion, monitor effects and side effects   Outcome: Progressing  Goal: Refrain from isolation  Description: Interventions:  - Develop a trusting relationship   - Encourage socialization   Outcome: Progressing  Goal: Refrain from self-neglect  Outcome: Progressing

## 2024-04-21 NOTE — NURSING NOTE
Weekly wellness assessment completed. Pt lung sounds clear in all lung fields. Trace edema noted. Bowel sounds +x4. B/l pedal pulses papable. Skin intact, warm and color within normal limits for patient.

## 2024-04-21 NOTE — NURSING NOTE
Alert, cooperative and visible intermittently. Consumed 100% of dinner. Socializing with selective peers. Took all medication without prompting. Nicotine gum administered as ordered. Maintained on safe precautions without incident.

## 2024-04-21 NOTE — NURSING NOTE
Alberto is noted to be sleeping quietly and comfortably by staff on their Q 7 minute checks. There is no sign of distress; no problems noted. We will continue to monitor him for the remainder of the night for any changes. He is able to make his needs known

## 2024-04-22 LAB
BASOPHILS # BLD AUTO: 0.04 THOUSANDS/ÂΜL (ref 0–0.1)
BASOPHILS NFR BLD AUTO: 0 % (ref 0–1)
BNP SERPL-MCNC: 12 PG/ML (ref 0–100)
CK SERPL-CCNC: 264 U/L (ref 39–308)
CLOZAPINE SERPL-MCNC: 528 NG/ML (ref 350–900)
CRP SERPL QL: 7.9 MG/L
EOSINOPHIL # BLD AUTO: 0.32 THOUSAND/ÂΜL (ref 0–0.61)
EOSINOPHIL NFR BLD AUTO: 3 % (ref 0–6)
ERYTHROCYTE [DISTWIDTH] IN BLOOD BY AUTOMATED COUNT: 14 % (ref 11.6–15.1)
HCT VFR BLD AUTO: 40 % (ref 36.5–49.3)
HGB BLD-MCNC: 12.4 G/DL (ref 12–17)
IMM GRANULOCYTES # BLD AUTO: 0.09 THOUSAND/UL (ref 0–0.2)
IMM GRANULOCYTES NFR BLD AUTO: 1 % (ref 0–2)
LYMPHOCYTES # BLD AUTO: 3.3 THOUSANDS/ÂΜL (ref 0.6–4.47)
LYMPHOCYTES NFR BLD AUTO: 29 % (ref 14–44)
MCH RBC QN AUTO: 23.7 PG (ref 26.8–34.3)
MCHC RBC AUTO-ENTMCNC: 31 G/DL (ref 31.4–37.4)
MCV RBC AUTO: 77 FL (ref 82–98)
MONOCYTES # BLD AUTO: 0.93 THOUSAND/ÂΜL (ref 0.17–1.22)
MONOCYTES NFR BLD AUTO: 8 % (ref 4–12)
NEUTROPHILS # BLD AUTO: 6.57 THOUSANDS/ÂΜL (ref 1.85–7.62)
NEUTS SEG NFR BLD AUTO: 59 % (ref 43–75)
NRBC BLD AUTO-RTO: 0 /100 WBCS
PLATELET # BLD AUTO: 243 THOUSANDS/UL (ref 149–390)
PMV BLD AUTO: 10.6 FL (ref 8.9–12.7)
RBC # BLD AUTO: 5.23 MILLION/UL (ref 3.88–5.62)
WBC # BLD AUTO: 11.25 THOUSAND/UL (ref 4.31–10.16)

## 2024-04-22 PROCEDURE — 80159 DRUG ASSAY CLOZAPINE: CPT | Performed by: PSYCHIATRY & NEUROLOGY

## 2024-04-22 PROCEDURE — 86140 C-REACTIVE PROTEIN: CPT | Performed by: PSYCHIATRY & NEUROLOGY

## 2024-04-22 PROCEDURE — 82550 ASSAY OF CK (CPK): CPT | Performed by: PSYCHIATRY & NEUROLOGY

## 2024-04-22 PROCEDURE — 99232 SBSQ HOSP IP/OBS MODERATE 35: CPT | Performed by: PSYCHIATRY & NEUROLOGY

## 2024-04-22 PROCEDURE — 83880 ASSAY OF NATRIURETIC PEPTIDE: CPT | Performed by: PSYCHIATRY & NEUROLOGY

## 2024-04-22 PROCEDURE — 85025 COMPLETE CBC W/AUTO DIFF WBC: CPT | Performed by: PSYCHIATRY & NEUROLOGY

## 2024-04-22 RX ADMIN — POLYETHYLENE GLYCOL 3350 17 G: 17 POWDER, FOR SOLUTION ORAL at 08:49

## 2024-04-22 RX ADMIN — MELATONIN TAB 3 MG 3 MG: 3 TAB at 21:23

## 2024-04-22 RX ADMIN — NICOTINE POLACRILEX 2 MG: 2 GUM, CHEWING BUCCAL at 08:53

## 2024-04-22 RX ADMIN — NICOTINE POLACRILEX 2 MG: 2 GUM, CHEWING BUCCAL at 13:01

## 2024-04-22 RX ADMIN — NICOTINE POLACRILEX 2 MG: 2 GUM, CHEWING BUCCAL at 15:59

## 2024-04-22 RX ADMIN — METFORMIN HYDROCHLORIDE 500 MG: 500 TABLET ORAL at 08:49

## 2024-04-22 RX ADMIN — CYANOCOBALAMIN TAB 1000 MCG 1000 MCG: 1000 TAB at 08:49

## 2024-04-22 RX ADMIN — SENNOSIDES AND DOCUSATE SODIUM 2 TABLET: 8.6; 5 TABLET ORAL at 08:49

## 2024-04-22 RX ADMIN — NICOTINE POLACRILEX 2 MG: 2 GUM, CHEWING BUCCAL at 20:32

## 2024-04-22 RX ADMIN — ATROPINE SULFATE 1 DROP: 10 SOLUTION/ DROPS OPHTHALMIC at 21:25

## 2024-04-22 RX ADMIN — NICOTINE POLACRILEX 2 MG: 2 GUM, CHEWING BUCCAL at 11:02

## 2024-04-22 RX ADMIN — CLOZAPINE 400 MG: 200 TABLET ORAL at 21:23

## 2024-04-22 RX ADMIN — RISPERIDONE 2 MG: 2 TABLET, FILM COATED ORAL at 21:23

## 2024-04-22 RX ADMIN — PROPRANOLOL HYDROCHLORIDE 10 MG: 10 TABLET ORAL at 21:24

## 2024-04-22 RX ADMIN — SENNOSIDES AND DOCUSATE SODIUM 2 TABLET: 8.6; 5 TABLET ORAL at 17:22

## 2024-04-22 RX ADMIN — NICOTINE POLACRILEX 2 MG: 2 GUM, CHEWING BUCCAL at 18:16

## 2024-04-22 RX ADMIN — ESCITALOPRAM OXALATE 20 MG: 10 TABLET, FILM COATED ORAL at 08:49

## 2024-04-22 RX ADMIN — AMLODIPINE BESYLATE 5 MG: 5 TABLET ORAL at 08:49

## 2024-04-22 RX ADMIN — PROPRANOLOL HYDROCHLORIDE 10 MG: 10 TABLET ORAL at 08:49

## 2024-04-22 NOTE — PROGRESS NOTES
Psychiatry Progress Note [facility]    Alberto Berumen 28 y.o. male MRN: [id number]  Unit/Bed#: [facility] 101-02 Encounter: [id number]  Code Status: Level 1 - Full Code    PCP: Joce Juan MD    Date of Admission:  3/29/2024 2008   Date of Service:  04/22/24    Patient Active Problem List   Diagnosis    GERD (gastroesophageal reflux disease)    Medical clearance for psychiatric admission    Schizoaffective disorder, bipolar type (HCC)    Tobacco abuse    T wave inversion in EKG    Syringoma    Chronic idiopathic constipation    Vitamin B 12 deficiency    Vitamin D deficiency    Confluent and reticulate papillomatosis    Class 2 obesity in adult    Primary hypertension    Elevated hemoglobin A1c     Review of systems: Unremarkable  Diagnosis: Schizoaffective bipolar    Assessment  Overall Status: Continues to report same voices that are threatening to kill him and his family still paranoid about the voices and hardly interacting and staying to himself mostly in bed attending only some groups but not aggressive or agitated.  Her maintenance ECT last Friday  certification Statement: The patient will continue to require additional inpatient hospital stay due to ongoing voices that are threatening to kill him and his family lack of response to medications and ECT so far.     Medications: Clozapine 400 mg at bedtime, propranolol 10 mg every 12 hours as as needed for anxiety, Risperdal 2 mg at bedtime Lexapro 20 mg once a day, atropine eyedrops sublingual for drooling of saliva and senna 2 tablets twice a day for constipation  All medications reviewed and I recommend they be continued for symptom management   side effects from treatment: None reported  Medication changes   None today   Medication education   Risks side effects benefits and precautions of medications discussed with patient and he did verbalize an understanding about risks for metabolic syndrome from being on neuroleptics and is form  "tardive dyskinesia etc. and special precautions about being on clozapine   Understanding of medications: Has some understanding   Justification for dual anti-psychotics: Not applicable    Non-pharmacological treatments  Continue with individual, group, milieu and occupational therapy using recovery principles and psycho-education about accepting illness and the need for treatment.   to contact aunt and explore ACT team referral which he never had  ECT to be continued  weekly for maintenance starting on 4/12/2024    Safety  Safety and communication plan established to target dynamic risk factors discussed above.    Discharge Plan   Back to his aunt with an ACT team    Interval Progress   Patient reports same voices that are threatening to kill him and his family but not commanding him to kill himself or others.  He does believes that the ECT has definitely helped so that he is not \"living much in my head\".  Has not been aggressive or agitated or threatening or self-abusive and is clean shaven with his scalp hair in dreadlocks.  Staying to himself socially isolated no behavioral outbursts or behavioral PRNs needed in the last 24 hours.  In touch with his sister and aunt remains polite friendly pleasant and bright when approached he sees he is trying to learn skills to cope with and told about the voices which he has been hearing for more than 2 years     Acceptance by patient: Accepting  Hopefulness in recovery: Living with his aunt again  Involved in reintegration process: Talking with his aunt and sister  Trusting in relationship with psychiatrist: Trusting  Sleep: Good  Appetite: Good  Compliance with Medications: Compliant  Group attendance: attending some 1/8 last Friday  Significant events: Did receive maintenance ECT last Friday otherwise still hearing same voices threatening to kill him and his family    Mental Status Exam  Appearance: age appropriate, improved grooming, looks older than stated " age, overweight, with hair in dreadlocks casually dressed with a clean shaven face, fairly groomed with good eye contact found resting on his bed in his room under the covers but did get up when approached  behavior: cooperative, mildly anxious, evasive, gesturing, slow responses.  Continues to remain preoccupied   speech: normal rate, normal volume, normal pitch  Mood: dysphoric, anxious, reports he feels good remains anxious paranoid as usual  Affect: constricted, inappropriate, mood-congruent  Thought Process: organized, logical, coherent, goal directed, linear, decreased rate of thoughts, slowing of thoughts, negative thinking, impaired abstract reasoning, concrete  Thought Content: paranoid ideation, some paranoia, grandiose ideas, intrusive thoughts, preoccupied, chronic, continues to report paranoia about people threatening to kill him and his family because of the voices.  He believes ECT has helped so that he is not much in his head.  No other delusions elicited.  No current suicidal or homicidal thoughts and no plans verbalized.  No phobias obsessions compulsions or distorted body perceptions reported.  Preoccupied with wanting to get ECTs more often despite reminding him that it may impair his memory  Perceptual Disturbances: Continues to hear the same voices that are threatening to kill him and his family but not commanding to hurt himself or others  Hx Risk Factors: chronic psychiatric problems, chronic anxiety symptoms, chronic psychotic symptoms, his aunt possibly moving away  Sensorium: Oriented x 3 spheres and situation  Cognition: recent and remote memory grossly intact  Consciousness: alert and awake  Attention: attention span and concentration are age appropriate  Intellect: appears to be of average intelligence  Insight: intact  Judgement: intact  Motor Activity: no abnormal movements     Vitals  Temp:  [97.8 °F (36.6 °C)] 97.8 °F (36.6 °C)  HR:  [] 100  Resp:  [18] 18  BP:  (129-145)/(65-88) 145/88  SpO2:  [98 %] 98 %  No intake or output data in the 24 hours ending 04/22/24 0430            Lab Results: All Labs For Current Hospital Admission Reviewed     Current Facility-Administered Medications   Medication Dose Route Frequency Provider Last Rate    acetaminophen  650 mg Oral Q4H PRN Jordan C Holter, DO      acetaminophen  650 mg Oral Q6H PRN HOLLI Lion      aluminum-magnesium hydroxide-simethicone  30 mL Oral Q4H PRN Jordan C Holter, DO      amLODIPine  5 mg Oral Daily HOLLI Lion      Artificial Tears  1 drop Both Eyes Q3H PRN Jordan C Holter, DO      atropine  1 drop Sublingual HS HOLLI Lion      atropine  1 drop Sublingual Daily PRN HOLLI Lion      haloperidol lactate  2.5 mg Intramuscular Q4H PRN Max 4/day HOLLI Lion      And    LORazepam  1 mg Intramuscular Q4H PRN Max 4/day HOLLI Lion      And    benztropine  0.5 mg Intramuscular Q4H PRN Max 4/day HOLLI Lion      benztropine  1 mg Intramuscular Q4H PRN Max 6/day Jordan C Holter,       haloperidol lactate  5 mg Intramuscular Q4H PRN Max 4/day HOLLI Lion      And    LORazepam  2 mg Intramuscular Q4H PRN Max 4/day HOLLI Lion      And    benztropine  1 mg Intramuscular Q4H PRN Max 4/day HOLLI Lion      benztropine  1 mg Oral Q4H PRN Max 6/day HOLLI Lion      benztropine  1 mg Oral Q4H PRN Max 6/day Jordan C Holter, DO      bisacodyl  10 mg Rectal Daily PRN HOLLI Lion      cloZAPine  400 mg Oral HS Bora Rosario MD      cyanocobalamin  1,000 mcg Oral Daily HOLLI Galvan      hydrOXYzine HCL  50 mg Oral Q6H PRN Max 4/day Jordan C Holter, DO      Or    diphenhydrAMINE  50 mg Intramuscular Q6H PRN Jordan C Holter, DO      hydrOXYzine HCL  50 mg Oral Q6H PRN Max 4/day HOLLI Lion      Or    diphenhydrAMINE  50 mg Intramuscular Q6H PRN HOLLI Lion      diphenhydrAMINE-zinc acetate   Topical BID PRN Eveline Hunt,  HOLLI      ergocalciferol  50,000 Units Oral Weekly HOLLI Galvan      escitalopram  20 mg Oral Daily HOLLI Lion      haloperidol  1 mg Oral Q6H PRN HOLLI Lion      haloperidol  2.5 mg Oral Q4H PRN Max 4/day HOLLI Lion      haloperidol  5 mg Oral Q4H PRN Max 4/day HOLLI Lion      hydrocortisone   Topical 4x Daily PRN HOLLI Lion      hydrOXYzine HCL  100 mg Oral Q6H PRN Max 4/day [facility] Holter, DO      Or    LORazepam  2 mg Intramuscular Q6H PRN [facility] Holter, DO      hydrOXYzine HCL  100 mg Oral Q6H PRN Max 4/day HOLLI Lion      Or    LORazepam  2 mg Intramuscular Q6H PRN HOLLI Lion      hydrOXYzine HCL  25 mg Oral Q6H PRN Max 4/day [facility] Holter, DO      ibuprofen  600 mg Oral Q8H PRN HOLLI Lion      melatonin  3 mg Oral HS [facility] Holter, DO      metFORMIN  500 mg Oral Daily With Breakfast HOLLI Lion      methocarbamol  500 mg Oral Q6H PRN HOLLI Lion      nicotine polacrilex  2 mg Oral Q2H PRN [facility] Holter, DO      OLANZapine  5 mg Oral Q4H PRN Max 3/day [facility] Holter, DO      Or    OLANZapine  2.5 mg Intramuscular Q4H PRN Max 3/day [facility] Holter, DO      OLANZapine  5 mg Oral Q3H PRN Max 3/day [facility] Holter, DO      Or    OLANZapine  5 mg Intramuscular Q3H PRN Max 3/day [facility] Holter, DO      OLANZapine  2.5 mg Oral Q4H PRN Max 6/day [facility] Holter, DO      ondansetron  4 mg Oral Q6H PRN HOLLI Lion      polyethylene glycol  17 g Oral Daily HOLLI Lion      polyethylene glycol  17 g Oral Daily PRN [facility] Holter, DO      propranolol  10 mg Oral Q12H KAYLIE HOLLI Lion      risperiDONE  2 mg Oral HS Bora Rosario MD      senna-docusate sodium  1 tablet Oral Daily PRN [facility] Holter, DO      senna-docusate sodium  2 tablet Oral BID HOLLI Lion      traZODone  50 mg Oral HS PRN HOLLI Lion      white petrolatum-mineral oil   Topical TID PRN HOLLI Lion          Counseling / Coordination of Care: Total floor / unit time spent today 15 minutes. Greater than 50% of total time was spent with the patient and / or family counseling and / or somewhat receptive to supportive listening and teaching positive coping skills to deal with symptom mangement.     Patient's Rights, confidentiality and exceptions to confidentiality, use of automated medical record, Behavioral Health Services staff access to medical record, and consent to treatment reviewed.    This note has been dictated and hence there may be problems with punctuation, spelling and formatting and if anyone has any concerns please address them to Dr. Rosario   This note is not shared with patient due to potential for making patient's condition worse by knowing the content of the note.

## 2024-04-22 NOTE — NURSING NOTE
Alberto remains quiet yet visible. He is superficial and somewhat guarded when engaging; he gives short replies to questions and is difficult to engage. He is out in the milieu, however and does not appear anxious or overly apprehensive when being addressed.  He is social with some select peers and is polite and cooperative but isolative to self while in the milieu He slept well and appeared comfortable and without distress when observed on Q 7 minute rounds by staff on their rounds at night

## 2024-04-22 NOTE — PLAN OF CARE
Problem: Alteration in Thoughts and Perception  Goal: Treatment Goal: Gain control of psychotic behaviors/thinking, reduce/eliminate presenting symptoms and demonstrate improved reality functioning upon discharge  Outcome: Progressing  Goal: Verbalize thoughts and feelings  Description: Interventions:  - Promote a nonjudgmental and trusting relationship with the patient through active listening and therapeutic communication  - Assess patient's level of functioning, behavior and potential for risk  - Engage patient in 1 on 1 interactions  - Encourage patient to express fears, feelings, frustrations, and discuss symptoms    - [place] patient to reality, help patient recognize reality-based thinking   - Administer medications as ordered and assess for potential side effects  - Provide the patient education related to the signs and symptoms of the illness and desired effects of prescribed medications  Outcome: Not Progressing  Goal: Refrain from acting on delusional thinking/internal stimuli  Description: Interventions:  - Monitor patient closely, per order   - Utilize least restrictive measures   - Set reasonable limits, give positive feedback for acceptable   - Administer medications as ordered and monitor of potential side effects  Outcome: Progressing  Goal: Agree to be compliant with medication regime, as prescribed and report medication side effects  Description: Interventions:  - Offer appropriate PRN medication and supervise ingestion; conduct AIMS, as needed   Outcome: Progressing  Goal: Attend and participate in unit activities, including therapeutic, recreational, and educational groups  Description: Interventions:  -Encourage Visitation and family involvement in care  Outcome: Progressing  Goal: Recognize dysfunctional thoughts, communicate reality-based thoughts at the time of discharge  Description: Interventions:  - Provide medication and psycho-education to assist patient in compliance and developing  insight into his/her illness   Outcome: Progressing  Goal: Complete daily ADLs, including personal hygiene independently, as able  Description: Interventions:  - Observe, teach, and assist patient with ADLS  - Monitor and promote a balance of rest/activity, with adequate nutrition and elimination   Outcome: Progressing     Problem: Ineffective Coping  Goal: Identifies ineffective coping skills  Outcome: Progressing     Problem: Depression  Goal: Verbalize thoughts and feelings  Description: Interventions:  - Assess and re-assess patient's level of risk   - Engage patient in 1:1 interactions, daily, for a minimum of 15 minutes   - Encourage patient to express feelings, fears, frustrations, hopes   Outcome: Not Progressing  Goal: Refrain from harming self  Description: Interventions:  - Monitor patient closely, per order   - Supervise medication ingestion, monitor effects and side effects   Outcome: Progressing  Goal: Refrain from isolation  Description: Interventions:  - Develop a trusting relationship   - Encourage socialization   Outcome: Progressing  Goal: Refrain from self-neglect  Outcome: Progressing  Goal: Attend and participate in unit activities, including therapeutic, recreational, and educational groups  Description: Interventions:  - Provide therapeutic and educational activities daily, encourage attendance and participation, and document same in the medical record   Outcome: Progressing  Goal: Complete daily ADLs, including personal hygiene independently, as able  Description: Interventions:  - Observe, teach, and assist patient with ADLS  -  Monitor and promote a balance of rest/activity, with adequate nutrition and elimination   Outcome: Progressing     Problem: Anxiety  Goal: Anxiety is at manageable level  Description: Interventions:  - Assess and monitor patient's anxiety level.   - Monitor for signs and symptoms (heart palpitations, chest pain, shortness of breath, headaches, nausea, feeling jumpy,  restlessness, irritable, apprehensive).   - Collaborate with interdisciplinary team and initiate plan and interventions as ordered.  - [place] patient to unit/surroundings  - Explain treatment plan  - Encourage participation in care  - Encourage verbalization of concerns/fears  - Identify coping mechanisms  - Assist in developing anxiety-reducing skills  - Administer/offer alternative therapies  - Limit or eliminate stimulants  Outcome: Not Progressing

## 2024-04-22 NOTE — PLAN OF CARE
Problem: Ineffective Coping  Goal: Identifies ineffective coping skills  Outcome: Progressing  Goal: Identifies healthy coping skills  Outcome: Progressing  Goal: Demonstrates healthy coping skills  Outcome: Progressing  Goal: Participates in unit activities  Description: Interventions:  - Provide therapeutic environment   - Provide required programming   - Redirect inappropriate behaviors   Outcome: Jasmina Dominguez attended 56% of the past weeks groups.

## 2024-04-22 NOTE — PROGRESS NOTES
04/22/24 0719   Team Meeting   Meeting Type Daily Rounds   Team Members Present   Team Members Present Physician;Nurse;;Other (Discipline and Name)   Patient/Family Present   Patient Present No   Patient's Family Present No     In attendance:  Dr. Alex Thomas, MD Dr. Jordan Holter, DO Eveline Hunt, HOLLI Lucas, RN  Luisana Alvarado, [facility]  Carla Lux, W  Irais Mcdowell M.S.    Groups: 1/8    Pt reports ongoing auditory hallucinations. Pt is overall cooperative and social with select peers. No bx issues noted.

## 2024-04-23 PROCEDURE — 99232 SBSQ HOSP IP/OBS MODERATE 35: CPT | Performed by: PSYCHIATRY & NEUROLOGY

## 2024-04-23 RX ADMIN — ATROPINE SULFATE 1 DROP: 10 SOLUTION/ DROPS OPHTHALMIC at 21:14

## 2024-04-23 RX ADMIN — METFORMIN HYDROCHLORIDE 500 MG: 500 TABLET ORAL at 08:49

## 2024-04-23 RX ADMIN — CLOZAPINE 450 MG: 25 TABLET ORAL at 21:14

## 2024-04-23 RX ADMIN — RISPERIDONE 2 MG: 2 TABLET, FILM COATED ORAL at 21:14

## 2024-04-23 RX ADMIN — NICOTINE POLACRILEX 2 MG: 2 GUM, CHEWING BUCCAL at 17:22

## 2024-04-23 RX ADMIN — NICOTINE POLACRILEX 2 MG: 2 GUM, CHEWING BUCCAL at 20:22

## 2024-04-23 RX ADMIN — NICOTINE POLACRILEX 2 MG: 2 GUM, CHEWING BUCCAL at 10:54

## 2024-04-23 RX ADMIN — PROPRANOLOL HYDROCHLORIDE 10 MG: 10 TABLET ORAL at 21:13

## 2024-04-23 RX ADMIN — MELATONIN TAB 3 MG 3 MG: 3 TAB at 21:14

## 2024-04-23 RX ADMIN — NICOTINE POLACRILEX 2 MG: 2 GUM, CHEWING BUCCAL at 08:51

## 2024-04-23 RX ADMIN — SENNOSIDES AND DOCUSATE SODIUM 2 TABLET: 8.6; 5 TABLET ORAL at 08:49

## 2024-04-23 RX ADMIN — ERGOCALCIFEROL 50000 UNITS: 1.25 CAPSULE, LIQUID FILLED ORAL at 08:49

## 2024-04-23 RX ADMIN — NICOTINE POLACRILEX 2 MG: 2 GUM, CHEWING BUCCAL at 14:54

## 2024-04-23 RX ADMIN — POLYETHYLENE GLYCOL 3350 17 G: 17 POWDER, FOR SOLUTION ORAL at 08:50

## 2024-04-23 RX ADMIN — CYANOCOBALAMIN TAB 1000 MCG 1000 MCG: 1000 TAB at 08:49

## 2024-04-23 RX ADMIN — ESCITALOPRAM OXALATE 20 MG: 10 TABLET, FILM COATED ORAL at 08:49

## 2024-04-23 RX ADMIN — SENNOSIDES AND DOCUSATE SODIUM 2 TABLET: 8.6; 5 TABLET ORAL at 17:22

## 2024-04-23 NOTE — PROGRESS NOTES
04/23/24 0742   Team Meeting   Meeting Type Daily Rounds   Team Members Present   Team Members Present Physician;Nurse;;Other (Discipline and Name)   Patient/Family Present   Patient Present No   Patient's Family Present No     In attendance:  Dr. Alex Thomas, MD Dr. Jordan Holter, DO Eveline Hunt, HOLLI Lucas, RN  Luisana Alvarado, [facility]  Carla Lux, W  Irais Mcdowell M.S.    Groups: 6/9    Pt was approved for continued ECT weekly on Fridays through 5/24. Pt needs updated [place]OM[place] Depression Scale completed. Pt reports ongoing auditory hallucinations; denies other symptoms. Pt is visible on the unit.

## 2024-04-23 NOTE — PROGRESS NOTES
"Progress Note - Behavioral Health   Alberto Berumen 28 y.o. male MRN: [id number]  Unit/Bed#: [facility] 101-02 Encounter: [id number]    Assessment/Plan   Principal Problem:    Schizoaffective disorder, bipolar type ([facility])  Active Problems:    GERD (gastroesophageal reflux disease)    Medical clearance for psychiatric admission    Tobacco abuse    T wave inversion in EKG    Chronic idiopathic constipation    Confluent and reticulate papillomatosis    Primary hypertension    Elevated hemoglobin A1c      Behavior over the last 24 hours:  unchanged  Sleep: normal  Appetite: normal  Medication side effects: No  ROS: no complaints    Subjective: Patient is a 28 year old male, with schizoaffective disorder, bipolar type. He attended 6/9 groups. He continues to have auditory hallucinations constantly telling him that they want to hurt him. He is less isolative and has been more involved with peers. He slept good. He reports that he hears the voices constantly, and that there are multiple voices. He states that he is paranoid that these voices will hurt him and his family.     Assessment: He continues to have auditory hallucinations even with medications. He is approved to receive ECT.    Mental Status Evaluation:  Appearance:  age appropriate, disheveled, overweight, and with bags under eyes. He is wearing a grey patient gown with teal grey loose pants. He has black hair curly hair.    Behavior:  psychomotor retardation and he is frequently yawning. He zones out of conversations easily and has to be reminded of the question.    Speech:  increased latency of response, normal volume.    Mood:  \"Tired\"   Affect:  blunted, mood-congruent, and redirectable   Thought Process:  blocked   Associations: thought blocking   Thought Content:  delusions  persecutory; he is paranoid that the multiple voices he is hearing will kill him and his family.   Denies grandiose, somatic, or bizarre delusions. Denies suicidal or homicidal ideations, " "intent, or plans. Denies phobias, obsessions, compulsions, or body distortion perceptions. Denies ideas of reference or preoccupation with violence. Does not appear to be hypervigilant.    Perceptual Disturbances: Auditory hallucinations without commands. They tell him that they will harm him and his family. He states that they have never told him to harm himself.  He does have any of the other sense hallucinations. Denies feelings of depersonalization, derealization or illusions. He does not appear to be responding to internal stimuli   Risk Potential: Suicidal Ideations none  Homicidal Ideations none  Potential for Aggression No   Sensorium:  person, place, time/date, situation, and day of week   Memory:  recent memory intact, remote memory intact, immediate memory intact  Immediate memory: able to relay 3 unrelated words (brown, tulip, honesty)   Recent memory: He is unaware of current events as he notes he doesn't watch the news often. Aware of the current [place] president; he recalled what he ate for breakfast (eggs, morris, [place]ese toast); he was able to recall the 3 unrelated words  Remote memory: able to relay the name of his high school that he attended   Consciousness:  alert    Attention: attention span and concentration were age appropriate; attention: able to repeat [id number] without errors; concentration: able to spell \"world\" forwards. He made one mistake spelling \"world\" backwards as he mistook the order of letters.   Intelligence:  Fund of general knowledge: He made a mistake when asked how many states in the [place] (\"56 or 52?\")   Calculation ability: he was able to relay how many quarters to equal a dollar  Abstract interpretation: he was able to relay the similarities between a banana and apple (\"both fruit\")  Problem solving: able to express why davila appears bigger than stars  Estimate of intellectual functioning: average   Impulse control: Does not appear to have poor impulse control    Insight:  age " "appropriate; he is aware of his diagnosis and that he needs to take medications. He cannot recall the name of any medications he takes    Judgment: age appropriate. He states that he would run out if he heard someone yell \"fire\" in a grocery store.   Social judgment: he is dressed appropriately, he acts appropriately.   Gait/Station: normal gait/station   Motor Activity: no abnormal movements     Progress Toward Goals: He is aware of his goals for therapy, he attends groups but does not seem to enjoy them -- notes he \"just wants to get credit.\" He is not responding to medications as he still hears voices daily and constantly. He was approved to receive ECT.    Recommended Treatment: Continue with group therapy, milieu therapy and occupational therapy.      Discharge plan: Plan is to discharge to his aunt's house, with an ACT team established to ensure he is taking medications.     Risks, benefits and possible side effects of Medications:   Risks, benefits, and possible side effects of medications explained to patient and patient verbalizes understanding.      Medications: all current active meds have been reviewed.    Labs: I have personally reviewed all pertinent laboratory/tests results.     Counseling / Coordination of Care  Total floor / unit time spent today 40 minutes. Greater than 50% of total time was spent with the patient and / or family counseling and / or coordination of care.   "

## 2024-04-23 NOTE — NURSING NOTE
Patient is pleasant and cooperative. He is social and interactive with peers and staff. Compliant with medications however morning blood pressure medications where held. Parameters were not met. Denied depression and anxiety.

## 2024-04-23 NOTE — PLAN OF CARE
Problem: Depression  Goal: Refrain from harming self  Description: Interventions:  - Monitor patient closely, per order   - Supervise medication ingestion, monitor effects and side effects   4/23/2024 0436 by Isabel Lama RN  Outcome: Progressing  4/23/2024 0307 by Isabel Lama RN  Outcome: Not Progressing     Problem: Electroconvulsive therapy (ECT)  Goal: Absence of urinary retention  Description: INTERVENTIONS:  - Assess patient’s ability to void and empty bladder  - Monitor I/O  - Bladder scan as needed  - Discuss with physician/AP medications to alleviate retention as needed  - Discuss catheterization for long term situations as appropriate  4/23/2024 0436 by Isabel Lama RN  Outcome: Progressing  4/23/2024 0307 by Isabel Lama RN  Outcome: Progressing  Goal: Minimal or absence of nausea and/or vomiting  Description: INTERVENTIONS:  - Administer IV fluids if ordered to ensure adequate hydration  - Maintain NPO status until nausea and vomiting are resolved  - Nasogastric tube if ordered  - Administer ordered antiemetic medications as needed  - Provide nonpharmacologic comfort measures as appropriate  - Advance diet as tolerated, if ordered  - Consider nutrition services referral to assist patient with adequate nutrition and appropriate food choices  4/23/2024 0436 by Isable Lama RN  Outcome: Progressing  4/23/2024 0307 by Isabel Lama RN  Outcome: Progressing     Problem: Alteration in Thoughts and Perception  Goal: Verbalize thoughts and feelings  Description: Interventions:  - Promote a nonjudgmental and trusting relationship with the patient through active listening and therapeutic communication  - Assess patient's level of functioning, behavior and potential for risk  - Engage patient in 1 on 1 interactions  - Encourage patient to express fears, feelings, frustrations, and discuss symptoms    - [place] patient to reality, help patient recognize reality-based thinking   - Administer medications as  ordered and assess for potential side effects  - Provide the patient education related to the signs and symptoms of the illness and desired effects of prescribed medications  4/23/2024 0436 by Isabel Lama RN  Outcome: Not Progressing  4/23/2024 0307 by Isabel Lama RN  Outcome: Not Progressing  Goal: Refrain from acting on delusional thinking/internal stimuli  Description: Interventions:  - Monitor patient closely, per order   - Utilize least restrictive measures   - Set reasonable limits, give positive feedback for acceptable   - Administer medications as ordered and monitor of potential side effects  4/23/2024 0436 by Isabel Lama RN  Outcome: Not Progressing  4/23/2024 0307 by Isabel Lama RN  Outcome: Not Progressing  Goal: Agree to be compliant with medication regime, as prescribed and report medication side effects  Description: Interventions:  - Offer appropriate PRN medication and supervise ingestion; conduct AIMS, as needed   4/23/2024 0436 by Isabel Lama RN  Outcome: Not Progressing  4/23/2024 0307 by Isabel Lama RN  Outcome: Not Progressing  Goal: Attend and participate in unit activities, including therapeutic, recreational, and educational groups  Description: Interventions:  -Encourage Visitation and family involvement in care  4/23/2024 0436 by Isabel Lama RN  Outcome: Not Progressing  4/23/2024 0307 by Isabel Lama RN  Outcome: Not Progressing  Goal: Recognize dysfunctional thoughts, communicate reality-based thoughts at the time of discharge  Description: Interventions:  - Provide medication and psycho-education to assist patient in compliance and developing insight into his/her illness   4/23/2024 0436 by Isabel Lama RN  Outcome: Not Progressing  4/23/2024 0307 by Isabel Lama RN  Outcome: Not Progressing  Goal: Complete daily ADLs, including personal hygiene independently, as able  Description: Interventions:  - Observe, teach, and assist patient with ADLS  - Monitor and promote a  balance of rest/activity, with adequate nutrition and elimination   4/23/2024 0436 by Isabel Lama RN  Outcome: Not Progressing  4/23/2024 0307 by Isabel Lama RN  Outcome: Not Progressing     Problem: Ineffective Coping  Goal: Identifies ineffective coping skills  4/23/2024 0436 by Isabel Lama RN  Outcome: Not Progressing  4/23/2024 0307 by Isabel Lama RN  Outcome: Not Progressing  Goal: Identifies healthy coping skills  4/23/2024 0436 by Isabel Lama RN  Outcome: Not Progressing  4/23/2024 0307 by Isabel Lama RN  Outcome: Not Progressing  Goal: Demonstrates healthy coping skills  4/23/2024 0436 by Isabel Lama RN  Outcome: Not Progressing  4/23/2024 0307 by Isabel Lama RN  Outcome: Not Progressing  Goal: Participates in unit activities  Description: Interventions:  - Provide therapeutic environment   - Provide required programming   - Redirect inappropriate behaviors   4/23/2024 0436 by Isabel Lama RN  Outcome: Not Progressing  4/23/2024 0307 by Isabel Lama RN  Outcome: Not Progressing  Goal: Patient/Family participate in treatment and DC plans  Description: Interventions:  - Provide therapeutic environment  4/23/2024 0436 by Isabel Lama RN  Outcome: Not Progressing  4/23/2024 0307 by Isabel Lama RN  Outcome: Not Progressing  Goal: Patient/Family verbalizes awareness of resources  4/23/2024 0436 by Isabel Lama RN  Outcome: Not Progressing  4/23/2024 0307 by Isabel Lama RN  Outcome: Not Progressing     Problem: Depression  Goal: Verbalize thoughts and feelings  Description: Interventions:  - Assess and re-assess patient's level of risk   - Engage patient in 1:1 interactions, daily, for a minimum of 15 minutes   - Encourage patient to express feelings, fears, frustrations, hopes   4/23/2024 0436 by Isabel Lama RN  Outcome: Not Progressing  4/23/2024 0307 by Isabel Lama RN  Outcome: Not Progressing  Goal: Refrain from isolation  Description: Interventions:  - Develop a trusting  relationship   - Encourage socialization   4/23/2024 0436 by Isabel Lama RN  Outcome: Not Progressing  4/23/2024 0307 by Isabel Lama RN  Outcome: Progressing  Goal: Attend and participate in unit activities, including therapeutic, recreational, and educational groups  Description: Interventions:  - Provide therapeutic and educational activities daily, encourage attendance and participation, and document same in the medical record   4/23/2024 0436 by Isabel Lama RN  Outcome: Not Progressing  4/23/2024 0307 by Isabel Lama RN  Outcome: Not Progressing  Goal: Complete daily ADLs, including personal hygiene independently, as able  Description: Interventions:  - Observe, teach, and assist patient with ADLS  -  Monitor and promote a balance of rest/activity, with adequate nutrition and elimination   4/23/2024 0436 by Isabel Lama RN  Outcome: Not Progressing  4/23/2024 0307 by Isabel Lama RN  Outcome: Not Progressing     Problem: Anxiety  Goal: Anxiety is at manageable level  Description: Interventions:  - Assess and monitor patient's anxiety level.   - Monitor for signs and symptoms (heart palpitations, chest pain, shortness of breath, headaches, nausea, feeling jumpy, restlessness, irritable, apprehensive).   - Collaborate with interdisciplinary team and initiate plan and interventions as ordered.  - [place] patient to unit/surroundings  - Explain treatment plan  - Encourage participation in care  - Encourage verbalization of concerns/fears  - Identify coping mechanisms  - Assist in developing anxiety-reducing skills  - Administer/offer alternative therapies  - Limit or eliminate stimulants  4/23/2024 0436 by Isabel Lama RN  Outcome: Not Progressing  4/23/2024 0307 by Isabel Lama RN  Outcome: Not Progressing     Problem: Alteration in Orientation  Goal: Interact with staff daily  Description: Interventions:  - Assess and re-assess patient's level of orientation  - Engage patient in 1 on 1 interactions,  daily, for a minimum of 15 minutes   - Establish rapport/trust with patient   4/23/2024 0436 by Isabel Lama RN  Outcome: Not Progressing  4/23/2024 0307 by Isabel Lama RN  Outcome: Not Progressing  Goal: Express concerns related to confused thinking related to:  Description: Interventions:  - Encourage patient to express feelings, fears, frustrations, hopes  - Assign consistent caregivers   - [place]/re-orient patient as needed  - Allow comfort items, as appropriate  - Provide visual cues, signs, etc.   4/23/2024 0436 by Isabel Lama RN  Outcome: Not Progressing  4/23/2024 0307 by Isabel Lama RN  Outcome: Not Progressing  Goal: Allow medical examinations, as recommended  Description: Interventions:  - Provide physical/neurological exams and/or referrals, per provider   4/23/2024 0436 by Isabel Lama RN  Outcome: Not Progressing  4/23/2024 0307 by Isabel Lama RN  Outcome: Not Progressing  Goal: Cooperate with recommended testing/procedures  Description: Interventions:  - Determine need for ancillary testing  - Observe for mental status changes  - Implement falls/precaution protocol   4/23/2024 0436 by Isabel Lama RN  Outcome: Not Progressing  4/23/2024 0307 by Isabel Lama RN  Outcome: Not Progressing  Goal: Attend and participate in unit activities, including therapeutic, recreational, and educational groups  Description: Interventions:  - Provide therapeutic and educational activities daily, encourage attendance and participation, and document same in the medical record   - Provide appropriate opportunities for reminiscence   - Provide a consistent daily routine   - Encourage family contact/visitation   4/23/2024 0436 by Isabel Lama RN  Outcome: Not Progressing  4/23/2024 0307 by Isabel Lama RN  Outcome: Not Progressing  Goal: Complete daily ADLs, including personal hygiene independently, as able  Description: Interventions:  - Observe, teach, and assist patient with ADLS  4/23/2024 0436 by Isabel  MIGUEL Lama  Outcome: Not Progressing  4/23/2024 0307 by Isabel Lama RN  Outcome: Not Progressing     Problem: Individualized Interventions  Goal: Patient will verbalize appropriate use of telephone within 5 days  Description: Interventions:  - Treatment team to determine use of supervised phone privileges   4/23/2024 0436 by Isabel Lama RN  Outcome: Not Progressing  4/23/2024 0307 by Isabel Lama RN  Outcome: Not Progressing  Goal: Patient will verbalize need for hospitalization and will no longer attempt elopement within 5 days  Description: Interventions:  - Ongoing education to help patient understand need for hospitalization  4/23/2024 0436 by Isabel Lama RN  Outcome: Not Progressing  4/23/2024 0307 by Isabel Lama RN  Outcome: Not Progressing  Goal: Patient will recognize inappropriate behaviors and develop alternative behaviors within 5 days  Description: Interventions:  - Patient in collaboration with Treatment Team will develop a behavior management plan to help identify effective coping skills to deal with stressors  4/23/2024 0436 by Isabel Lama RN  Outcome: Not Progressing  4/23/2024 0307 by Isabel Lama RN  Outcome: Not Progressing     Problem: Electroconvulsive therapy (ECT)  Goal: Verbalizes/displays adequate comfort level or baseline comfort level  Description: Interventions:  - Encourage patient to monitor pain and request assistance  - Assess pain using appropriate pain scale  - Administer analgesics based on type and severity of pain and evaluate response  - Implement non-pharmacological measures as appropriate and evaluate response  - Consider cultural and social influences on pain and pain management  - Notify physician/advanced practitioner if interventions unsuccessful or patient reports new pain  4/23/2024 0436 by Isabel Lama RN  Outcome: Not Progressing  4/23/2024 0307 by Isbael Lama RN  Outcome: Not Progressing  Goal: Achieves stable or improved neurological  status  Description: INTERVENTIONS  - Monitor and report changes in neurological status  - Monitor vital signs such as temperature, blood pressure, glucose, and any other labs ordered   - Initiate measures to prevent increased intracranial pressure  - Monitor for seizure activity and implement precautions if appropriate      4/23/2024 0436 by Isabel Lama RN  Outcome: Not Progressing  4/23/2024 0307 by Isabel Lama RN  Outcome: Not Progressing  Goal: Achieves maximal functionality and self care  Description: INTERVENTIONS  - Monitor swallowing and airway patency with patient fatigue and changes in neurological status  - Encourage and assist patient to increase activity and self care.   - Encourage visually impaired, hearing impaired and aphasic patients to use assistive/communication devices  4/23/2024 0436 by Isabel Lama RN  Outcome: Not Progressing  4/23/2024 0307 by Isabel Lama RN  Outcome: Not Progressing  Goal: Maintain or return mobility to safest level of function  Description: INTERVENTIONS:  - Assess patient's ability to carry out ADLs; assess patient's baseline for ADL function and identify physical deficits which impact ability to perform ADLs (bathing, care of mouth/teeth, toileting, grooming, dressing, etc.)  - Assess/evaluate cause of self-care deficits   - Assess range of motion  - Assess patient's mobility  - Assess patient's need for assistive devices and provide as appropriate  - Encourage maximum independence but intervene and supervise when necessary  - Involve family in performance of ADLs  - Assess for home care needs following discharge   - Consider OT consult to assist with ADL evaluation and planning for discharge  - Provide patient education as appropriate  4/23/2024 0436 by Isabel Lama RN  Outcome: Not Progressing  4/23/2024 0307 by Isabel Lama RN  Outcome: Not Progressing  Goal: Maintains adequate nutritional intake  Description: INTERVENTIONS:  - Monitor percentage of each meal  consumed  - Identify factors contributing to decreased intake, treat as appropriate  - Assist with meals as needed  - Monitor I&O, weight, and lab values if indicated  - Obtain nutrition services referral as needed  4/23/2024 0436 by Isabel Lama RN  Outcome: Not Progressing  4/23/2024 0307 by Isabel Lama RN  Outcome: Progressing     Problem: DISCHARGE PLANNING - CARE MANAGEMENT  Goal: Discharge to post-acute care or home with appropriate resources  Description: INTERVENTIONS:  - Conduct assessment to determine patient/family and health care team treatment goals, and need for post-acute services based on payer coverage, community resources, and patient preferences, and barriers to discharge  - Address psychosocial, clinical, and financial barriers to discharge as identified in assessment in conjunction with the patient/family and health care team  - Arrange appropriate level of post-acute services according to patient’s   needs and preference and payer coverage in collaboration with the physician and health care team  - Communicate with and update the patient/family, physician, and health care team regarding progress on the discharge plan  - Arrange appropriate transportation to post-acute venues  4/23/2024 0436 by Isabel Lama RN  Outcome: Not Progressing  4/23/2024 0307 by Isabel Lama RN  Outcome: Not Progressing

## 2024-04-23 NOTE — PLAN OF CARE
Problem: Depression  Goal: Refrain from isolation  Description: Interventions:  - Develop a trusting relationship   - Encourage socialization   Outcome: Progressing     Problem: Electroconvulsive therapy (ECT)  Goal: Absence of urinary retention  Description: INTERVENTIONS:  - Assess patient’s ability to void and empty bladder  - Monitor I/O  - Bladder scan as needed  - Discuss with physician/AP medications to alleviate retention as needed  - Discuss catheterization for long term situations as appropriate  Outcome: Progressing  Goal: Minimal or absence of nausea and/or vomiting  Description: INTERVENTIONS:  - Administer IV fluids if ordered to ensure adequate hydration  - Maintain NPO status until nausea and vomiting are resolved  - Nasogastric tube if ordered  - Administer ordered antiemetic medications as needed  - Provide nonpharmacologic comfort measures as appropriate  - Advance diet as tolerated, if ordered  - Consider nutrition services referral to assist patient with adequate nutrition and appropriate food choices  Outcome: Progressing  Goal: Maintains adequate nutritional intake  Description: INTERVENTIONS:  - Monitor percentage of each meal consumed  - Identify factors contributing to decreased intake, treat as appropriate  - Assist with meals as needed  - Monitor I&O, weight, and lab values if indicated  - Obtain nutrition services referral as needed  Outcome: Progressing     Problem: Alteration in Thoughts and Perception  Goal: Verbalize thoughts and feelings  Description: Interventions:  - Promote a nonjudgmental and trusting relationship with the patient through active listening and therapeutic communication  - Assess patient's level of functioning, behavior and potential for risk  - Engage patient in 1 on 1 interactions  - Encourage patient to express fears, feelings, frustrations, and discuss symptoms    - [place] patient to reality, help patient recognize reality-based thinking   - Administer  medications as ordered and assess for potential side effects  - Provide the patient education related to the signs and symptoms of the illness and desired effects of prescribed medications  Outcome: Not Progressing  Goal: Refrain from acting on delusional thinking/internal stimuli  Description: Interventions:  - Monitor patient closely, per order   - Utilize least restrictive measures   - Set reasonable limits, give positive feedback for acceptable   - Administer medications as ordered and monitor of potential side effects  Outcome: Not Progressing  Goal: Agree to be compliant with medication regime, as prescribed and report medication side effects  Description: Interventions:  - Offer appropriate PRN medication and supervise ingestion; conduct AIMS, as needed   Outcome: Not Progressing  Goal: Attend and participate in unit activities, including therapeutic, recreational, and educational groups  Description: Interventions:  -Encourage Visitation and family involvement in care  Outcome: Not Progressing  Goal: Recognize dysfunctional thoughts, communicate reality-based thoughts at the time of discharge  Description: Interventions:  - Provide medication and psycho-education to assist patient in compliance and developing insight into his/her illness   Outcome: Not Progressing  Goal: Complete daily ADLs, including personal hygiene independently, as able  Description: Interventions:  - Observe, teach, and assist patient with ADLS  - Monitor and promote a balance of rest/activity, with adequate nutrition and elimination   Outcome: Not Progressing     Problem: Ineffective Coping  Goal: Identifies ineffective coping skills  Outcome: Not Progressing  Goal: Identifies healthy coping skills  Outcome: Not Progressing  Goal: Demonstrates healthy coping skills  Outcome: Not Progressing  Goal: Participates in unit activities  Description: Interventions:  - Provide therapeutic environment   - Provide required programming   -  Redirect inappropriate behaviors   Outcome: Not Progressing  Goal: Patient/Family participate in treatment and DC plans  Description: Interventions:  - Provide therapeutic environment  Outcome: Not Progressing  Goal: Patient/Family verbalizes awareness of resources  Outcome: Not Progressing     Problem: Depression  Goal: Verbalize thoughts and feelings  Description: Interventions:  - Assess and re-assess patient's level of risk   - Engage patient in 1:1 interactions, daily, for a minimum of 15 minutes   - Encourage patient to express feelings, fears, frustrations, hopes   Outcome: Not Progressing  Goal: Refrain from harming self  Description: Interventions:  - Monitor patient closely, per order   - Supervise medication ingestion, monitor effects and side effects   Outcome: Not Progressing  Goal: Attend and participate in unit activities, including therapeutic, recreational, and educational groups  Description: Interventions:  - Provide therapeutic and educational activities daily, encourage attendance and participation, and document same in the medical record   Outcome: Not Progressing  Goal: Complete daily ADLs, including personal hygiene independently, as able  Description: Interventions:  - Observe, teach, and assist patient with ADLS  -  Monitor and promote a balance of rest/activity, with adequate nutrition and elimination   Outcome: Not Progressing     Problem: Anxiety  Goal: Anxiety is at manageable level  Description: Interventions:  - Assess and monitor patient's anxiety level.   - Monitor for signs and symptoms (heart palpitations, chest pain, shortness of breath, headaches, nausea, feeling jumpy, restlessness, irritable, apprehensive).   - Collaborate with interdisciplinary team and initiate plan and interventions as ordered.  - [place] patient to unit/surroundings  - Explain treatment plan  - Encourage participation in care  - Encourage verbalization of concerns/fears  - Identify coping mechanisms  -  Assist in developing anxiety-reducing skills  - Administer/offer alternative therapies  - Limit or eliminate stimulants  Outcome: Not Progressing     Problem: Alteration in Orientation  Goal: Interact with staff daily  Description: Interventions:  - Assess and re-assess patient's level of orientation  - Engage patient in 1 on 1 interactions, daily, for a minimum of 15 minutes   - Establish rapport/trust with patient   Outcome: Not Progressing  Goal: Express concerns related to confused thinking related to:  Description: Interventions:  - Encourage patient to express feelings, fears, frustrations, hopes  - Assign consistent caregivers   - [place]/re-orient patient as needed  - Allow comfort items, as appropriate  - Provide visual cues, signs, etc.   Outcome: Not Progressing  Goal: Allow medical examinations, as recommended  Description: Interventions:  - Provide physical/neurological exams and/or referrals, per provider   Outcome: Not Progressing  Goal: Cooperate with recommended testing/procedures  Description: Interventions:  - Determine need for ancillary testing  - Observe for mental status changes  - Implement falls/precaution protocol   Outcome: Not Progressing  Goal: Attend and participate in unit activities, including therapeutic, recreational, and educational groups  Description: Interventions:  - Provide therapeutic and educational activities daily, encourage attendance and participation, and document same in the medical record   - Provide appropriate opportunities for reminiscence   - Provide a consistent daily routine   - Encourage family contact/visitation   Outcome: Not Progressing  Goal: Complete daily ADLs, including personal hygiene independently, as able  Description: Interventions:  - Observe, teach, and assist patient with ADLS  Outcome: Not Progressing     Problem: Individualized Interventions  Goal: Patient will verbalize appropriate use of telephone within 5 days  Description: Interventions:  -  Treatment team to determine use of supervised phone privileges   Outcome: Not Progressing  Goal: Patient will verbalize need for hospitalization and will no longer attempt elopement within 5 days  Description: Interventions:  - Ongoing education to help patient understand need for hospitalization  Outcome: Not Progressing  Goal: Patient will recognize inappropriate behaviors and develop alternative behaviors within 5 days  Description: Interventions:  - Patient in collaboration with Treatment Team will develop a behavior management plan to help identify effective coping skills to deal with stressors  Outcome: Not Progressing     Problem: Electroconvulsive therapy (ECT)  Goal: Verbalizes/displays adequate comfort level or baseline comfort level  Description: Interventions:  - Encourage patient to monitor pain and request assistance  - Assess pain using appropriate pain scale  - Administer analgesics based on type and severity of pain and evaluate response  - Implement non-pharmacological measures as appropriate and evaluate response  - Consider cultural and social influences on pain and pain management  - Notify physician/advanced practitioner if interventions unsuccessful or patient reports new pain  Outcome: Not Progressing  Goal: Achieves stable or improved neurological status  Description: INTERVENTIONS  - Monitor and report changes in neurological status  - Monitor vital signs such as temperature, blood pressure, glucose, and any other labs ordered   - Initiate measures to prevent increased intracranial pressure  - Monitor for seizure activity and implement precautions if appropriate      Outcome: Not Progressing  Goal: Achieves maximal functionality and self care  Description: INTERVENTIONS  - Monitor swallowing and airway patency with patient fatigue and changes in neurological status  - Encourage and assist patient to increase activity and self care.   - Encourage visually impaired, hearing impaired and  aphasic patients to use assistive/communication devices  Outcome: Not Progressing  Goal: Maintain or return mobility to safest level of function  Description: INTERVENTIONS:  - Assess patient's ability to carry out ADLs; assess patient's baseline for ADL function and identify physical deficits which impact ability to perform ADLs (bathing, care of mouth/teeth, toileting, grooming, dressing, etc.)  - Assess/evaluate cause of self-care deficits   - Assess range of motion  - Assess patient's mobility  - Assess patient's need for assistive devices and provide as appropriate  - Encourage maximum independence but intervene and supervise when necessary  - Involve family in performance of ADLs  - Assess for home care needs following discharge   - Consider OT consult to assist with ADL evaluation and planning for discharge  - Provide patient education as appropriate  Outcome: Not Progressing     Problem: DISCHARGE PLANNING - CARE MANAGEMENT  Goal: Discharge to post-acute care or home with appropriate resources  Description: INTERVENTIONS:  - Conduct assessment to determine patient/family and health care team treatment goals, and need for post-acute services based on payer coverage, community resources, and patient preferences, and barriers to discharge  - Address psychosocial, clinical, and financial barriers to discharge as identified in assessment in conjunction with the patient/family and health care team  - Arrange appropriate level of post-acute services according to patient’s   needs and preference and payer coverage in collaboration with the physician and health care team  - Communicate with and update the patient/family, physician, and health care team regarding progress on the discharge plan  - Arrange appropriate transportation to post-acute venues  Outcome: Not Progressing

## 2024-04-23 NOTE — NURSING NOTE
Patient visible and social on the unit. Patient compliant with medications and meals. He states he still has auditory hallucinations. Patient appears brighter and more engaging with staff and peers.

## 2024-04-24 PROCEDURE — 99232 SBSQ HOSP IP/OBS MODERATE 35: CPT | Performed by: PSYCHIATRY & NEUROLOGY

## 2024-04-24 RX ADMIN — ESCITALOPRAM OXALATE 20 MG: 10 TABLET, FILM COATED ORAL at 08:43

## 2024-04-24 RX ADMIN — PROPRANOLOL HYDROCHLORIDE 10 MG: 10 TABLET ORAL at 21:11

## 2024-04-24 RX ADMIN — SENNOSIDES AND DOCUSATE SODIUM 2 TABLET: 8.6; 5 TABLET ORAL at 17:23

## 2024-04-24 RX ADMIN — ATROPINE SULFATE 1 DROP: 10 SOLUTION/ DROPS OPHTHALMIC at 21:12

## 2024-04-24 RX ADMIN — NICOTINE POLACRILEX 2 MG: 2 GUM, CHEWING BUCCAL at 15:22

## 2024-04-24 RX ADMIN — RISPERIDONE 2 MG: 2 TABLET, FILM COATED ORAL at 21:11

## 2024-04-24 RX ADMIN — NICOTINE POLACRILEX 2 MG: 2 GUM, CHEWING BUCCAL at 17:23

## 2024-04-24 RX ADMIN — PROPRANOLOL HYDROCHLORIDE 10 MG: 10 TABLET ORAL at 08:43

## 2024-04-24 RX ADMIN — SENNOSIDES AND DOCUSATE SODIUM 2 TABLET: 8.6; 5 TABLET ORAL at 08:43

## 2024-04-24 RX ADMIN — CYANOCOBALAMIN TAB 1000 MCG 1000 MCG: 1000 TAB at 08:43

## 2024-04-24 RX ADMIN — NICOTINE POLACRILEX 2 MG: 2 GUM, CHEWING BUCCAL at 12:52

## 2024-04-24 RX ADMIN — METFORMIN HYDROCHLORIDE 500 MG: 500 TABLET ORAL at 08:43

## 2024-04-24 RX ADMIN — NICOTINE POLACRILEX 2 MG: 2 GUM, CHEWING BUCCAL at 08:43

## 2024-04-24 RX ADMIN — POLYETHYLENE GLYCOL 3350 17 G: 17 POWDER, FOR SOLUTION ORAL at 08:43

## 2024-04-24 RX ADMIN — CLOZAPINE 450 MG: 25 TABLET ORAL at 21:11

## 2024-04-24 RX ADMIN — MELATONIN TAB 3 MG 3 MG: 3 TAB at 21:11

## 2024-04-24 RX ADMIN — AMLODIPINE BESYLATE 5 MG: 5 TABLET ORAL at 08:43

## 2024-04-24 RX ADMIN — NICOTINE POLACRILEX 2 MG: 2 GUM, CHEWING BUCCAL at 21:32

## 2024-04-24 RX ADMIN — NICOTINE POLACRILEX 2 MG: 2 GUM, CHEWING BUCCAL at 19:26

## 2024-04-24 NOTE — PLAN OF CARE
Problem: Ineffective Coping  Goal: Identifies ineffective coping skills  Outcome: Progressing  Goal: Identifies healthy coping skills  Outcome: Progressing  Goal: Demonstrates healthy coping skills  Outcome: Progressing  Goal: Participates in unit activities  Description: Interventions:  - Provide therapeutic environment   - Provide required programming   - Redirect inappropriate behaviors   Outcome: Progressing   Attended 8/9 groups yesterday

## 2024-04-24 NOTE — SOCIAL WORK
SW called pt's sister Marleny - sister inquired about pt's presentation and progress. SW provided brief update and inquired about availability to set up virtual call for pt. Pt will have virtual visit with his sister Marleny tomorrow, 4/25 at 1pm.

## 2024-04-24 NOTE — SOCIAL WORK
Sw checked in with pt. Pt was preparing to meet with med student on the unit. SW inquired about current needs or concerns and offered to set up virtual visit for pt. Pt indicated he would like to do a virtual visit with his sister. SW will follow up.

## 2024-04-24 NOTE — NURSING NOTE
Patient appears tired. Patient woken up for breakfast. Patient is pleasant and cooperative Denies depression and anxiety. He continues with auditory hallucinations. Denies SI/HI.  Patient compliant with medications.

## 2024-04-24 NOTE — PLAN OF CARE
Problem: Alteration in Thoughts and Perception  Goal: Treatment Goal: Gain control of psychotic behaviors/thinking, reduce/eliminate presenting symptoms and demonstrate improved reality functioning upon discharge  Outcome: Progressing  Goal: Verbalize thoughts and feelings  Description: Interventions:  - Promote a nonjudgmental and trusting relationship with the patient through active listening and therapeutic communication  - Assess patient's level of functioning, behavior and potential for risk  - Engage patient in 1 on 1 interactions  - Encourage patient to express fears, feelings, frustrations, and discuss symptoms    - [place] patient to reality, help patient recognize reality-based thinking   - Administer medications as ordered and assess for potential side effects  - Provide the patient education related to the signs and symptoms of the illness and desired effects of prescribed medications  Outcome: Progressing  Goal: Refrain from acting on delusional thinking/internal stimuli  Description: Interventions:  - Monitor patient closely, per order   - Utilize least restrictive measures   - Set reasonable limits, give positive feedback for acceptable   - Administer medications as ordered and monitor of potential side effects  Outcome: Progressing  Goal: Agree to be compliant with medication regime, as prescribed and report medication side effects  Description: Interventions:  - Offer appropriate PRN medication and supervise ingestion; conduct AIMS, as needed   Outcome: Progressing  Goal: Attend and participate in unit activities, including therapeutic, recreational, and educational groups  Description: Interventions:  -Encourage Visitation and family involvement in care  Outcome: Progressing  Goal: Recognize dysfunctional thoughts, communicate reality-based thoughts at the time of discharge  Description: Interventions:  - Provide medication and psycho-education to assist patient in compliance and developing  insight into his/her illness   Outcome: Progressing  Goal: Complete daily ADLs, including personal hygiene independently, as able  Description: Interventions:  - Observe, teach, and assist patient with ADLS  - Monitor and promote a balance of rest/activity, with adequate nutrition and elimination   Outcome: Progressing     Problem: Ineffective Coping  Goal: Identifies ineffective coping skills  Outcome: Progressing  Goal: Identifies healthy coping skills  Outcome: Progressing  Goal: Demonstrates healthy coping skills  Outcome: Progressing  Goal: Participates in unit activities  Description: Interventions:  - Provide therapeutic environment   - Provide required programming   - Redirect inappropriate behaviors   Outcome: Progressing     Problem: Depression  Goal: Treatment Goal: Demonstrate behavioral control of depressive symptoms, verbalize feelings of improved mood/affect, and adopt new coping skills prior to discharge  Outcome: Progressing  Goal: Verbalize thoughts and feelings  Description: Interventions:  - Assess and re-assess patient's level of risk   - Engage patient in 1:1 interactions, daily, for a minimum of 15 minutes   - Encourage patient to express feelings, fears, frustrations, hopes   Outcome: Progressing  Goal: Refrain from harming self  Description: Interventions:  - Monitor patient closely, per order   - Supervise medication ingestion, monitor effects and side effects   Outcome: Progressing  Goal: Refrain from isolation  Description: Interventions:  - Develop a trusting relationship   - Encourage socialization   Outcome: Progressing  Goal: Refrain from self-neglect  Outcome: Progressing  Goal: Attend and participate in unit activities, including therapeutic, recreational, and educational groups  Description: Interventions:  - Provide therapeutic and educational activities daily, encourage attendance and participation, and document same in the medical record   Outcome: Progressing  Goal: Complete  daily ADLs, including personal hygiene independently, as able  Description: Interventions:  - Observe, teach, and assist patient with ADLS  -  Monitor and promote a balance of rest/activity, with adequate nutrition and elimination   Outcome: Progressing     Problem: Anxiety  Goal: Anxiety is at manageable level  Description: Interventions:  - Assess and monitor patient's anxiety level.   - Monitor for signs and symptoms (heart palpitations, chest pain, shortness of breath, headaches, nausea, feeling jumpy, restlessness, irritable, apprehensive).   - Collaborate with interdisciplinary team and initiate plan and interventions as ordered.  - [place] patient to unit/surroundings  - Explain treatment plan  - Encourage participation in care  - Encourage verbalization of concerns/fears  - Identify coping mechanisms  - Assist in developing anxiety-reducing skills  - Administer/offer alternative therapies  - Limit or eliminate stimulants  Outcome: Progressing

## 2024-04-24 NOTE — NURSING NOTE
Pt is present on the milieu and social with select peers. He consumed 100 % of dinner. Took his medications without incidence. Nicorette gum given at 1722 and 2022. Pt is polite, pleasant, and cooperative. Brightens on approach. Denied all psychiatric symptoms except AH. Pt offered no complaints. Attended groups. No behavioral issues.

## 2024-04-24 NOTE — PROGRESS NOTES
04/24/24 0722   Team Meeting   Meeting Type Daily Rounds   Team Members Present   Team Members Present Physician;Nurse;;Other (Discipline and Name)   Patient/Family Present   Patient Present No   Patient's Family Present No     In attendance:  MD Eveline Gamino, HOLLI Lucas, MIGUEL Alvarado, [facility]  Carla Lux, W  MATILDA Daniel.S.    Groups: 8/10    Pt has been more social and active on the unit, interacting appropriately with peers and staff. Pt has been pleasant, cooperative and bright. [place]'s Depression Scale was completed. No bx issues noted.

## 2024-04-24 NOTE — PROGRESS NOTES
"Progress Note - Behavioral Health   Alberto Berumen 28 y.o. male MRN: [id number]  Unit/Bed#: [facility] 101-02 Encounter: [id number]    Assessment/Plan   Principal Problem:    Schizoaffective disorder, bipolar type ([facility])  Active Problems:    GERD (gastroesophageal reflux disease)    Medical clearance for psychiatric admission    Tobacco abuse    T wave inversion in EKG    Chronic idiopathic constipation    Confluent and reticulate papillomatosis    Primary hypertension    Elevated hemoglobin A1c      Behavior over the last 24 hours:  unchanged  Sleep: normal  Appetite: normal  Medication side effects: No  ROS: no complaints    Subjective: He is a 27 y/o male, with hx of schizoaffective disorder, bipolar type. He attended 8/10 groups. He had his blood pressure medications held due to blood pressure parameters. He was pleasant, cooperative, and social with peers and staff. He requested and received nicotine gum. He expresses presence of auditory hallucinations, telling him they will harm him and his family. He denies any other psych symptoms. He states that he slept well, was not interrupted or had any nightmares. He states that he is fatigued. He said that he enjoyed art therapy yesterday, he notes he colored and that it is relaxing. He states that his day was good. He was easily able to be awoken and guided to have a discussion in the conference room.     Assessment: he remains to have auditory hallucinations and paranoid delusions with medication and ECT in place.     Mental Status Evaluation:  Appearance:  age appropriate, casually dressed, and he is wearing a grey patient gown with teal loose pants. He has black hair in dreadlocks. He appears fatigued.  Appropriate hygiene.    Behavior:  Cooperative, pleasant, calm. Makes good eye contact   Speech:  increased latency of response   Mood:  \"Stable\"   Affect:  constricted and mood-congruent   Thought Process:  normal; he relayed what his favorite season was and what he " "enjoys to do during that time. Coherent, linear, goal-directed   Associations: intact associations, coherent   Thought Content:  delusions  persecutory; he expresses feeling paranoid due to his auditory hallucinations   Perceptual Disturbances: Auditory hallucinations without commands; he states that he hears multiple voices constantly telling him that they will harm him and his family   Risk Potential: Suicidal Ideations none  Homicidal Ideations none  Potential for Aggression No   Sensorium:  person, place, time/date, situation, and day of week   Memory:  recent memory intact, remote memory intact, immediate memory intact  Immediate memory: able to relay 3 unrelated words (pink, factory, table)  Recent memory: unable to relay the 3 unrelated words; unaware of current events, aware of who the current president is; expressed what he had for breakfast today (morris, Dominican toast)  Remote memory: relayed where he went for middle school ([place])   Consciousness:  alert    Attention: attention span and concentration were age appropriate; attention: he was able to relay a phone number without error  Concentration: able to spell \"build\" forwards without error, he made one error attempting to spell \"build\" backwards   Intelligence:  Fund of knowledge: He is aware of who the current president is  Calculation ability: able to relay how many dimes equal a dollar  Problem solving: he expresses that the stars appear smaller than the moon because they are farther away  Abstract interpretation: he expresses that the similarity between a giraffe and elephant is that they are both animals   Insight:  age appropriate; he is aware of his diagnosis, uncertain of the medications he takes. Understands the need for therapy   Judgment: age appropriate; upon being asked what he would do if he found a stamped and signed envelope on the ground -- he said \"my nosiness would make look in it and then I would send it out\"    Impulse control: " He does not appear to have poor impulse control    Gait/Station: normal gait/station   Motor Activity: no abnormal movements     Progress Toward Goals: He appears to be improving -- he is more present on the unit and less isolative. He continues to have auditory hallucinations on medications and with ECT.     Recommended Treatment: Continue with group therapy, milieu therapy and occupational therapy.      Discharge plan: Plan is to discharge home to his aunt, and to establish an ACT team to ensure that he takes medications.    Risks, benefits and possible side effects of Medications:   Risks, benefits, and possible side effects of medications explained to patient and patient verbalizes understanding.      Medications: all current active meds have been reviewed.    Labs: I have personally reviewed all pertinent laboratory/tests results.     Counseling / Coordination of Care  Total floor / unit time spent today 20 minutes. Greater than 50% of total time was spent with the patient and / or family counseling and / or coordination of care.

## 2024-04-24 NOTE — NURSING NOTE
Pt is present on the milieu and social with select peers. He consumed 100 % of dinner. Took his medications without incidence. Nicorette gum given at 1522, 1723, 1926, and 2132. Pt is polite, pleasant, and cooperative. Brightens on approach. Denied psychiatric symptoms except AH. Attended groups. Pt played cards with peers.  No behavioral issues.

## 2024-04-25 ENCOUNTER — ANESTHESIA EVENT (OUTPATIENT)
Dept: ANESTHESIOLOGY | Facility: HOSPITAL | Age: 29
End: 2024-04-25

## 2024-04-25 ENCOUNTER — ANESTHESIA (OUTPATIENT)
Dept: ANESTHESIOLOGY | Facility: HOSPITAL | Age: 29
End: 2024-04-25

## 2024-04-25 PROCEDURE — 99232 SBSQ HOSP IP/OBS MODERATE 35: CPT | Performed by: PSYCHIATRY & NEUROLOGY

## 2024-04-25 RX ORDER — SODIUM CHLORIDE, SODIUM LACTATE, POTASSIUM CHLORIDE, CALCIUM CHLORIDE 600; 310; 30; 20 MG/100ML; MG/100ML; MG/100ML; MG/100ML
50 INJECTION, SOLUTION INTRAVENOUS CONTINUOUS
OUTPATIENT
Start: 2024-04-25

## 2024-04-25 RX ADMIN — CYANOCOBALAMIN TAB 1000 MCG 1000 MCG: 1000 TAB at 08:16

## 2024-04-25 RX ADMIN — SENNOSIDES AND DOCUSATE SODIUM 2 TABLET: 8.6; 5 TABLET ORAL at 08:16

## 2024-04-25 RX ADMIN — NICOTINE POLACRILEX 2 MG: 2 GUM, CHEWING BUCCAL at 14:31

## 2024-04-25 RX ADMIN — ESCITALOPRAM OXALATE 20 MG: 10 TABLET, FILM COATED ORAL at 08:15

## 2024-04-25 RX ADMIN — METFORMIN HYDROCHLORIDE 500 MG: 500 TABLET ORAL at 08:16

## 2024-04-25 RX ADMIN — PROPRANOLOL HYDROCHLORIDE 10 MG: 10 TABLET ORAL at 21:22

## 2024-04-25 RX ADMIN — SENNOSIDES AND DOCUSATE SODIUM 2 TABLET: 8.6; 5 TABLET ORAL at 17:17

## 2024-04-25 RX ADMIN — CLOZAPINE 450 MG: 25 TABLET ORAL at 21:22

## 2024-04-25 RX ADMIN — NICOTINE POLACRILEX 2 MG: 2 GUM, CHEWING BUCCAL at 08:17

## 2024-04-25 RX ADMIN — RISPERIDONE 2 MG: 2 TABLET, FILM COATED ORAL at 21:22

## 2024-04-25 RX ADMIN — POLYETHYLENE GLYCOL 3350 17 G: 17 POWDER, FOR SOLUTION ORAL at 08:15

## 2024-04-25 RX ADMIN — MELATONIN TAB 3 MG 3 MG: 3 TAB at 21:22

## 2024-04-25 RX ADMIN — NICOTINE POLACRILEX 2 MG: 2 GUM, CHEWING BUCCAL at 19:53

## 2024-04-25 RX ADMIN — NICOTINE POLACRILEX 2 MG: 2 GUM, CHEWING BUCCAL at 17:17

## 2024-04-25 NOTE — PROGRESS NOTES
04/25/24 0723   Team Meeting   Meeting Type Daily Rounds   Team Members Present   Team Members Present Physician;Nurse;;Other (Discipline and Name)   Patient/Family Present   Patient Present No   Patient's Family Present No     In attendance:  Dr. Alex Thomas, MD Dr. Jordan Holter, DO Eveline Hunt, HOLLI Taylor, RN  Luisana Alvarado, [facility]  Carla Lux, W  MATILDA Daniel.S.    Groups: 6/8    Pt reports ongoing auditory hallucinations. Pt has been attending groups and socializing with peers. No bx issues noted.

## 2024-04-25 NOTE — NURSING NOTE
Patient is visible on unit, quiet but able to make needs known. Pt takes all medications without issue. Pt reports no s/s, voices no concerns.

## 2024-04-25 NOTE — PROGRESS NOTES
"Progress Note - Behavioral Health   Alberto Berumen 28 y.o. male MRN: [id number]  Unit/Bed#: [facility] 101-02 Encounter: [id number]    Assessment/Plan   Principal Problem:    Schizoaffective disorder, bipolar type ([facility])  Active Problems:    GERD (gastroesophageal reflux disease)    Medical clearance for psychiatric admission    Tobacco abuse    T wave inversion in EKG    Chronic idiopathic constipation    Confluent and reticulate papillomatosis    Primary hypertension    Elevated hemoglobin A1c      Behavior over the last 24 hours:  improved  Sleep: normal  Appetite: normal  Medication side effects: No  ROS: no complaints    Subjective: Patient is a 28 year old male with schizoaffective disorder, bipolar type. He remains to verbalize auditory hallucinations telling him that they will harm him and his family. He states that ECT has been helping to allow him to \"get out of his head.\" He denies feeling paranoia about the voices. He has been more present on the unit, he is pleasant and social. He was playing cards with peers. He attended 6/8 groups, he notes that he enjoyed some of them -- not clarifying which. He makes his needs known, he asked for a nicotine patch. He has a virtual visit with one of his sisters at 1 pm.     Assessment: he remains to have auditory hallucinations with medication regiment and ECT; he does express experiencing any paranoia pertaining to the voices.     Mental Status Evaluation:  Appearance:  age appropriate, casually dressed, and he is well-groomed. He is wearing hospital attire -- grey patient gown and teal loose pants. He has black hair in dreadlocks   Behavior:  psychomotor retardation, he has slowed movements. He is cooperative, pleasant, calm, and makes good eye contact.    Speech:  increased latency of response, normal volume. No speech impediments   Mood:  \"Okay\"   Affect:  blunted and mood-congruent. Stable affect.   Thought Process:  normal and logical; he expressed what his favorite " "genre of music was and why he likes it. Coherent, goal-directed.    Associations: Intact associations, coherent.   Thought Content:  normal. He denies any experiences relating to persecutory, grandiose, somatic, or bizarre delusions. Denies SI, HI, obsessions, compulsions, or distorted body perceptions. He does not appear to be hypervigilant.   Perceptual Disturbances: Auditory hallucinations without commands. He states that there are multiple voices that threaten to kill him and his family. He denies tactile, visual, gustatory, or olfactory hallucinations. Denies ideas of reference. He does not appear to be responding to internal stimuli    Risk Potential: Suicidal Ideations none  Homicidal Ideations none  Potential for Aggression No   Sensorium:  person, place, time/date, situation, and day of week   Memory:  recent memory intact, remote memory intact, immediate memory intact  Immediate memory: able to relay 3 unrelated words (apple, hood, blue)  Recent memory: able to relay what he had for dinner last night; who the current president is; able to recall the 3 unrelated words with some guidance  Remote memory: able to relay where he went to high school   Consciousness:  alert    Attention: attention span and concentration were age appropriate  Attention: he was able to repeat a phone number ([phone])  Concentration: able to spell \"table\" forwards and backwards with one missing letter backwards that he quickly corrected on his own.    Intelligence: Fund of general knowledge: able to express who the former [place] president was  Calculation ability: able to express how many nickels are in a dollar  Problem solving: able to express why the moon appears bigger than the stars  Abstract interpretation: able to express the similarity between bank and house  Estimate of intelligence: average   Impulse control: He does not appear to have poor impulse control.    Insight:  age appropriate; he is aware of his diagnosis and the " "need for medication and therapy   Judgment: age appropriate; he states that he would take the stairs if he had to get to the above floor and the elevator was broken  Social judgment: he is acting appropriately and he is dressed appropriately.   Gait/Station: normal gait/station but slowed   Motor Activity: no abnormal movements     Progress Toward Goals: He is improving in goals, he says he thinks ECT has helped to \"stay out of his head\" even though the voices continue to be constant. He denies experiencing paranoia toward voices. He is less isolative and is social with peers.    Recommended Treatment: Continue with group therapy, milieu therapy and occupational therapy.      Discharge plan: Plan is to discharge home to his aunt with ACT team in place to ensure he is caring for himself and is taking medications.     Risks, benefits and possible side effects of Medications:   Risks, benefits, and possible side effects of medications explained to patient and patient verbalizes understanding.      Medications: all current active meds have been reviewed.    Labs: I have personally reviewed all pertinent laboratory/tests results.     Counseling / Coordination of Care  Total floor / unit time spent today 20 minutes. Greater than 50% of total time was spent with the patient and / or family counseling and / or coordination of care.   "

## 2024-04-25 NOTE — SOCIAL WORK
SW attempted to wake pt up for his virtual visit with his sister. Despite several loud verbal attempts and nudging pt's bedframe, pt was unable to be woken up. SW called sister Marleny and notified her pt is sleeping deeply and visit needs to be rescheduled. Rescheduled visit will be Sunday 4/28 at 2pm.    Sister requests that pt calls her when he wakes up today.

## 2024-04-25 NOTE — PLAN OF CARE
Problem: Alteration in Thoughts and Perception  Goal: Treatment Goal: Gain control of psychotic behaviors/thinking, reduce/eliminate presenting symptoms and demonstrate improved reality functioning upon discharge  Outcome: Progressing  Goal: Verbalize thoughts and feelings  Description: Interventions:  - Promote a nonjudgmental and trusting relationship with the patient through active listening and therapeutic communication  - Assess patient's level of functioning, behavior and potential for risk  - Engage patient in 1 on 1 interactions  - Encourage patient to express fears, feelings, frustrations, and discuss symptoms    - Dawn patient to reality, help patient recognize reality-based thinking   - Administer medications as ordered and assess for potential side effects  - Provide the patient education related to the signs and symptoms of the illness and desired effects of prescribed medications  Outcome: Progressing  Goal: Refrain from acting on delusional thinking/internal stimuli  Description: Interventions:  - Monitor patient closely, per order   - Utilize least restrictive measures   - Set reasonable limits, give positive feedback for acceptable   - Administer medications as ordered and monitor of potential side effects  Outcome: Progressing  Goal: Agree to be compliant with medication regime, as prescribed and report medication side effects  Description: Interventions:  - Offer appropriate PRN medication and supervise ingestion; conduct AIMS, as needed   Outcome: Progressing  Goal: Attend and participate in unit activities, including therapeutic, recreational, and educational groups  Description: Interventions:  -Encourage Visitation and family involvement in care  Outcome: Progressing  Goal: Recognize dysfunctional thoughts, communicate reality-based thoughts at the time of discharge  Description: Interventions:  - Provide medication and psycho-education to assist patient in compliance and developing  insight into his/her illness   Outcome: Progressing  Goal: Complete daily ADLs, including personal hygiene independently, as able  Description: Interventions:  - Observe, teach, and assist patient with ADLS  - Monitor and promote a balance of rest/activity, with adequate nutrition and elimination   Outcome: Progressing     Problem: Ineffective Coping  Goal: Identifies ineffective coping skills  Outcome: Progressing  Goal: Identifies healthy coping skills  Outcome: Progressing  Goal: Demonstrates healthy coping skills  Outcome: Progressing  Goal: Participates in unit activities  Description: Interventions:  - Provide therapeutic environment   - Provide required programming   - Redirect inappropriate behaviors   Outcome: Progressing     Problem: Depression  Goal: Treatment Goal: Demonstrate behavioral control of depressive symptoms, verbalize feelings of improved mood/affect, and adopt new coping skills prior to discharge  Outcome: Progressing  Goal: Verbalize thoughts and feelings  Description: Interventions:  - Assess and re-assess patient's level of risk   - Engage patient in 1:1 interactions, daily, for a minimum of 15 minutes   - Encourage patient to express feelings, fears, frustrations, hopes   Outcome: Progressing  Goal: Refrain from harming self  Description: Interventions:  - Monitor patient closely, per order   - Supervise medication ingestion, monitor effects and side effects   Outcome: Progressing  Goal: Refrain from isolation  Description: Interventions:  - Develop a trusting relationship   - Encourage socialization   Outcome: Progressing  Goal: Refrain from self-neglect  Outcome: Progressing  Goal: Attend and participate in unit activities, including therapeutic, recreational, and educational groups  Description: Interventions:  - Provide therapeutic and educational activities daily, encourage attendance and participation, and document same in the medical record   Outcome: Progressing  Goal: Complete  daily ADLs, including personal hygiene independently, as able  Description: Interventions:  - Observe, teach, and assist patient with ADLS  -  Monitor and promote a balance of rest/activity, with adequate nutrition and elimination   Outcome: Progressing     Problem: Anxiety  Goal: Anxiety is at manageable level  Description: Interventions:  - Assess and monitor patient's anxiety level.   - Monitor for signs and symptoms (heart palpitations, chest pain, shortness of breath, headaches, nausea, feeling jumpy, restlessness, irritable, apprehensive).   - Collaborate with interdisciplinary team and initiate plan and interventions as ordered.  - [place] patient to unit/surroundings  - Explain treatment plan  - Encourage participation in care  - Encourage verbalization of concerns/fears  - Identify coping mechanisms  - Assist in developing anxiety-reducing skills  - Administer/offer alternative therapies  - Limit or eliminate stimulants  Outcome: Progressing     Problem: Alteration in Orientation  Goal: Interact with staff daily  Description: Interventions:  - Assess and re-assess patient's level of orientation  - Engage patient in 1 on 1 interactions, daily, for a minimum of 15 minutes   - Establish rapport/trust with patient   Outcome: Progressing

## 2024-04-26 ENCOUNTER — APPOINTMENT (INPATIENT)
Dept: PREOP/PACU | Facility: HOSPITAL | Age: 29
DRG: 750 | End: 2024-04-26
Attending: PSYCHIATRY & NEUROLOGY
Payer: COMMERCIAL

## 2024-04-26 ENCOUNTER — ANESTHESIA EVENT (INPATIENT)
Dept: PREOP/PACU | Facility: HOSPITAL | Age: 29
DRG: 750 | End: 2024-04-26
Payer: COMMERCIAL

## 2024-04-26 ENCOUNTER — ANESTHESIA (INPATIENT)
Dept: PREOP/PACU | Facility: HOSPITAL | Age: 29
DRG: 750 | End: 2024-04-26
Payer: COMMERCIAL

## 2024-04-26 PROCEDURE — 99232 SBSQ HOSP IP/OBS MODERATE 35: CPT | Performed by: PSYCHIATRY & NEUROLOGY

## 2024-04-26 PROCEDURE — GZB2ZZZ ELECTROCONVULSIVE THERAPY, BILATERAL-SINGLE SEIZURE: ICD-10-PCS | Performed by: STUDENT IN AN ORGANIZED HEALTH CARE EDUCATION/TRAINING PROGRAM

## 2024-04-26 PROCEDURE — 90870 ELECTROCONVULSIVE THERAPY: CPT

## 2024-04-26 PROCEDURE — 90870 ELECTROCONVULSIVE THERAPY: CPT | Performed by: STUDENT IN AN ORGANIZED HEALTH CARE EDUCATION/TRAINING PROGRAM

## 2024-04-26 RX ORDER — HYDRALAZINE HYDROCHLORIDE 20 MG/ML
INJECTION INTRAMUSCULAR; INTRAVENOUS AS NEEDED
Status: DISCONTINUED | OUTPATIENT
Start: 2024-04-26 | End: 2024-04-26

## 2024-04-26 RX ORDER — SUCCINYLCHOLINE/SOD CL,ISO/PF 100 MG/5ML
SYRINGE (ML) INTRAVENOUS AS NEEDED
Status: DISCONTINUED | OUTPATIENT
Start: 2024-04-26 | End: 2024-04-26

## 2024-04-26 RX ORDER — ESMOLOL HYDROCHLORIDE 10 MG/ML
INJECTION INTRAVENOUS AS NEEDED
Status: DISCONTINUED | OUTPATIENT
Start: 2024-04-26 | End: 2024-04-26

## 2024-04-26 RX ORDER — SODIUM CHLORIDE, SODIUM LACTATE, POTASSIUM CHLORIDE, CALCIUM CHLORIDE 600; 310; 30; 20 MG/100ML; MG/100ML; MG/100ML; MG/100ML
INJECTION, SOLUTION INTRAVENOUS CONTINUOUS PRN
Status: DISCONTINUED | OUTPATIENT
Start: 2024-04-26 | End: 2024-04-26

## 2024-04-26 RX ORDER — KETOROLAC TROMETHAMINE 30 MG/ML
INJECTION, SOLUTION INTRAMUSCULAR; INTRAVENOUS AS NEEDED
Status: DISCONTINUED | OUTPATIENT
Start: 2024-04-26 | End: 2024-04-26

## 2024-04-26 RX ORDER — GLYCOPYRROLATE 0.2 MG/ML
INJECTION INTRAMUSCULAR; INTRAVENOUS AS NEEDED
Status: DISCONTINUED | OUTPATIENT
Start: 2024-04-26 | End: 2024-04-26

## 2024-04-26 RX ORDER — LABETALOL HYDROCHLORIDE 5 MG/ML
INJECTION, SOLUTION INTRAVENOUS AS NEEDED
Status: DISCONTINUED | OUTPATIENT
Start: 2024-04-26 | End: 2024-04-26

## 2024-04-26 RX ORDER — ETOMIDATE 2 MG/ML
INJECTION INTRAVENOUS AS NEEDED
Status: DISCONTINUED | OUTPATIENT
Start: 2024-04-26 | End: 2024-04-26

## 2024-04-26 RX ADMIN — ESMOLOL HYDROCHLORIDE 30 MG: 100 INJECTION, SOLUTION INTRAVENOUS at 07:17

## 2024-04-26 RX ADMIN — AMLODIPINE BESYLATE 5 MG: 5 TABLET ORAL at 06:03

## 2024-04-26 RX ADMIN — PROPRANOLOL HYDROCHLORIDE 10 MG: 10 TABLET ORAL at 06:04

## 2024-04-26 RX ADMIN — PROPRANOLOL HYDROCHLORIDE 10 MG: 10 TABLET ORAL at 21:11

## 2024-04-26 RX ADMIN — NICOTINE POLACRILEX 2 MG: 2 GUM, CHEWING BUCCAL at 16:31

## 2024-04-26 RX ADMIN — CLOZAPINE 450 MG: 25 TABLET ORAL at 21:11

## 2024-04-26 RX ADMIN — GLYCOPYRROLATE 0.2 MG: 0.2 INJECTION INTRAMUSCULAR; INTRAVENOUS at 07:11

## 2024-04-26 RX ADMIN — SODIUM CHLORIDE, SODIUM LACTATE, POTASSIUM CHLORIDE, AND CALCIUM CHLORIDE: .6; .31; .03; .02 INJECTION, SOLUTION INTRAVENOUS at 07:09

## 2024-04-26 RX ADMIN — ETOMIDATE INJECTION 20 MG: 2 SOLUTION INTRAVENOUS at 07:09

## 2024-04-26 RX ADMIN — SENNOSIDES AND DOCUSATE SODIUM 2 TABLET: 8.6; 5 TABLET ORAL at 17:08

## 2024-04-26 RX ADMIN — MELATONIN TAB 3 MG 3 MG: 3 TAB at 21:11

## 2024-04-26 RX ADMIN — LABETALOL 20 MG/4 ML (5 MG/ML) INTRAVENOUS SYRINGE 5 MG: at 07:10

## 2024-04-26 RX ADMIN — RISPERIDONE 2 MG: 2 TABLET, FILM COATED ORAL at 21:11

## 2024-04-26 RX ADMIN — KETOROLAC TROMETHAMINE 30 MG: 30 INJECTION, SOLUTION INTRAMUSCULAR; INTRAVENOUS at 07:16

## 2024-04-26 RX ADMIN — GLYCOPYRROLATE 0.2 MG: 0.2 INJECTION INTRAMUSCULAR; INTRAVENOUS at 07:03

## 2024-04-26 RX ADMIN — POLYETHYLENE GLYCOL 3350 17 G: 17 POWDER, FOR SOLUTION ORAL at 08:24

## 2024-04-26 RX ADMIN — METFORMIN HYDROCHLORIDE 500 MG: 500 TABLET ORAL at 08:23

## 2024-04-26 RX ADMIN — CYANOCOBALAMIN TAB 1000 MCG 1000 MCG: 1000 TAB at 08:23

## 2024-04-26 RX ADMIN — ESCITALOPRAM OXALATE 20 MG: 10 TABLET, FILM COATED ORAL at 08:23

## 2024-04-26 RX ADMIN — ESMOLOL HYDROCHLORIDE 20 MG: 100 INJECTION, SOLUTION INTRAVENOUS at 07:21

## 2024-04-26 RX ADMIN — HYDRALAZINE HYDROCHLORIDE 10 MG: 20 INJECTION INTRAMUSCULAR; INTRAVENOUS at 07:10

## 2024-04-26 RX ADMIN — SENNOSIDES AND DOCUSATE SODIUM 2 TABLET: 8.6; 5 TABLET ORAL at 08:23

## 2024-04-26 RX ADMIN — NICOTINE POLACRILEX 2 MG: 2 GUM, CHEWING BUCCAL at 08:24

## 2024-04-26 RX ADMIN — Medication 140 MG: at 07:09

## 2024-04-26 RX ADMIN — HYDRALAZINE HYDROCHLORIDE 10 MG: 20 INJECTION INTRAMUSCULAR; INTRAVENOUS at 07:07

## 2024-04-26 NOTE — ANESTHESIA PREPROCEDURE EVALUATION
Procedure:  ELECTROCONVULSIVE THERAPY (ECT)    Relevant Problems   CARDIO   (+) Primary hypertension      GI/HEPATIC   (+) GERD (gastroesophageal reflux disease)        Physical Exam    Airway    Mallampati score: II  TM Distance: >3 FB  Neck ROM: full     Dental       Cardiovascular  Cardiovascular exam normal    Pulmonary  Pulmonary exam normal     Other Findings        Anesthesia Plan  ASA Score- 2     Anesthesia Type- general with ASA Monitors.         Additional Monitors:     Airway Plan:            Plan Factors-Exercise tolerance (METS): >4 METS.    Chart reviewed. EKG reviewed.  Existing labs reviewed. Patient summary reviewed.                  Induction- intravenous.    Postoperative Plan-     Informed Consent- Anesthetic plan and risks discussed with patient.  I personally reviewed this patient with the CRNA. Discussed and agreed on the Anesthesia Plan with the CRNA..              Lab Results   Component Value Date    HGBA1C 5.0 04/01/2024       Lab Results   Component Value Date    K 4.1 04/01/2024     04/01/2024    CO2 27 04/01/2024    BUN 12 04/01/2024    CREATININE 0.90 04/01/2024    GLUF 97 01/11/2024    CALCIUM 9.3 04/01/2024    AST 23 04/01/2024    ALT 53 (H) 04/01/2024    ALKPHOS 84 04/01/2024    EGFR 116 04/01/2024       Lab Results   Component Value Date    WBC 11.25 (H) 04/22/2024    HGB 12.4 04/22/2024    HCT 40.0 04/22/2024    MCV 77 (L) 04/22/2024     04/22/2024     Sinus tachycardia  Nonspecific T wave abnormality  Abnormal ECG  When compared with ECG of 11-MAR-2024 15:41, (unconfirmed)  Sinus rhythm has replaced Ectopic atrial rhythm  QRS axis Shifted left  Confirmed by Angel Lara ([id number]) on 3/11/2024 5:11:48 PM      Specimen Collected: 03/11/24 15:42

## 2024-04-26 NOTE — PROGRESS NOTES
Psychiatry Progress Note [facility]    Alberto Berumen 28 y.o. male MRN: [id number]  Unit/Bed#: [facility] 101-02 Encounter: [id number]  Code Status: Level 1 - Full Code    PCP: Joce Juan MD    Date of Admission:  3/29/2024 2008   Date of Service:  04/26/24    Patient Active Problem List   Diagnosis    GERD (gastroesophageal reflux disease)    Medical clearance for psychiatric admission    Schizoaffective disorder, bipolar type (HCC)    Tobacco abuse    T wave inversion in EKG    Syringoma    Chronic idiopathic constipation    Vitamin B 12 deficiency    Vitamin D deficiency    Confluent and reticulate papillomatosis    Class 2 obesity in adult    Primary hypertension    Elevated hemoglobin A1c     Review of systems: Unremarkable  Diagnosis: Schizoaffective bipolar    Assessment  Overall Status: Continues to report same voices that are threatening to kill him and his family still paranoid about the voices and hardly interacting and staying to himself mostly in bed attending only some groups but not aggressive or agitated.  Her maintenance ECT last Friday  certification Statement: The patient will continue to require additional inpatient hospital stay due to ongoing voices that are threatening to kill him and his family lack of response to medications and ECT so far.     Medications: Clozapine 400 mg at bedtime, propranolol 10 mg every 12 hours as as needed for anxiety, Risperdal 2 mg at bedtime Lexapro 20 mg once a day, atropine eyedrops sublingual for drooling of saliva and senna 2 tablets twice a day for constipation  All medications reviewed and I recommend they be continued for symptom management   side effects from treatment: None reported  Medication changes   None today   Medication education   Risks side effects benefits and precautions of medications discussed with patient and he did verbalize an understanding about risks for metabolic syndrome from being on neuroleptics and is form  "tardive dyskinesia etc. and special precautions about being on clozapine   Understanding of medications: Has some understanding   Justification for dual anti-psychotics: Not applicable    Non-pharmacological treatments  Continue with individual, group, milieu and occupational therapy using recovery principles and psycho-education about accepting illness and the need for treatment.   to contact aunt and explore ACT team referral which he never had  ECT to be continued  weekly for maintenance starting on 4/12/2024    Safety  Safety and communication plan established to target dynamic risk factors discussed above.    Discharge Plan   Back to his aunt with an ACT team    Interval Progress   Patient reports same voices that are threatening to kill him and his family but not commanding him to kill himself or others.  He does believes that the ECT has definitely helped so that he is not \"living much in my head\".  Has not been aggressive or agitated or threatening or self-abusive and is clean shaven with his scalp hair in dreadlocks.  Staying to himself socially isolated no behavioral outbursts or behavioral PRNs needed in the last 24 hours.  In touch with his sister and aunt remains polite friendly pleasant and bright when approached he sees he is trying to learn skills to cope with and told about the voices which he has been hearing for more than 2 years     Acceptance by patient: Accepting  Hopefulness in recovery: Living with his aunt again  Involved in reintegration process: Talking with his aunt and sister  Trusting in relationship with psychiatrist: Trusting  Sleep: Good  Appetite: Good  Compliance with Medications: Compliant  Group attendance: attending some 1/8 last Friday  Significant events: Did receive maintenance ECT last Friday otherwise still hearing same voices threatening to kill him and his family    Mental Status Exam  Appearance: age appropriate, improved grooming, looks older than stated " age, overweight, with hair in dreadlocks casually dressed with a clean shaven face, fairly groomed with good eye contact found resting on his bed in his room under the covers but did get up when approached  behavior: cooperative, mildly anxious, evasive, gesturing, slow responses.  Continues to remain preoccupied   speech: normal rate, normal volume, normal pitch  Mood: dysphoric, anxious, reports he feels good remains anxious paranoid as usual  Affect: constricted, inappropriate, mood-congruent  Thought Process: organized, logical, coherent, goal directed, linear, decreased rate of thoughts, slowing of thoughts, negative thinking, impaired abstract reasoning, concrete  Thought Content: paranoid ideation, some paranoia, grandiose ideas, intrusive thoughts, preoccupied, chronic, continues to report paranoia about people threatening to kill him and his family because of the voices.  He believes ECT has helped so that he is not much in his head.  No other delusions elicited.  No current suicidal or homicidal thoughts and no plans verbalized.  No phobias obsessions compulsions or distorted body perceptions reported.  Preoccupied with wanting to get ECTs more often despite reminding him that it may impair his memory  Perceptual Disturbances: Continues to hear the same voices that are threatening to kill him and his family but not commanding to hurt himself or others  Hx Risk Factors: chronic psychiatric problems, chronic anxiety symptoms, chronic psychotic symptoms, his aunt possibly moving away  Sensorium: Oriented x 3 spheres and situation  Cognition: recent and remote memory grossly intact  Consciousness: alert and awake  Attention: attention span and concentration are age appropriate  Intellect: appears to be of average intelligence  Insight: intact  Judgement: intact  Motor Activity: no abnormal movements     Vitals  Temp:  [97.5 °F (36.4 °C)-98.2 °F (36.8 °C)] 98 °F (36.7 °C)  HR:  [] 108  Resp:  [12-18]  17  BP: (128-172)/(70-93) 150/71  SpO2:  [94 %-100 %] 97 %    Intake/Output Summary (Last 24 hours) at 4/26/2024 0832  Last data filed at 4/26/2024 0723  Gross per 24 hour   Intake 300 ml   Output --   Net 300 ml               Lab Results: All Labs For Current Hospital Admission Reviewed     Current Facility-Administered Medications   Medication Dose Route Frequency Provider Last Rate    acetaminophen  650 mg Oral Q4H PRN Jordan C Holter,       acetaminophen  650 mg Oral Q6H PRN HOLLI Lion      aluminum-magnesium hydroxide-simethicone  30 mL Oral Q4H PRN Jordan C Holter,       amLODIPine  5 mg Oral Daily HOLLI Lion      Artificial Tears  1 drop Both Eyes Q3H PRN Jordan C Holter, DO      atropine  1 drop Sublingual HS HOLLI Lion      atropine  1 drop Sublingual Daily PRN HOLLI Lion      haloperidol lactate  2.5 mg Intramuscular Q4H PRN Max 4/day STEVE LionNP      And    LORazepam  1 mg Intramuscular Q4H PRN Max 4/day HOLLI Lion      And    benztropine  0.5 mg Intramuscular Q4H PRN Max 4/day HOLLI Lion      benztropine  1 mg Intramuscular Q4H PRN Max 6/day Jordan C Holter,       haloperidol lactate  5 mg Intramuscular Q4H PRN Max 4/day HOLLI Lion      And    LORazepam  2 mg Intramuscular Q4H PRN Max 4/day HOLLI Lion      And    benztropine  1 mg Intramuscular Q4H PRN Max 4/day HOLLI Lion      benztropine  1 mg Oral Q4H PRN Max 6/day HOLLI Lion      benztropine  1 mg Oral Q4H PRN Max 6/day Jordan C Holter, DO      bisacodyl  10 mg Rectal Daily PRN HOLLI Lion      cloZAPine  450 mg Oral HS Bora Rosario MD      cyanocobalamin  1,000 mcg Oral Daily HOLLI Galvan      hydrOXYzine HCL  50 mg Oral Q6H PRN Max 4/day Jordan C Holter, DO      Or    diphenhydrAMINE  50 mg Intramuscular Q6H PRN Jordan C Holter,       hydrOXYzine HCL  50 mg Oral Q6H PRN Max 4/day HOLLI Lion      Or    diphenhydrAMINE  50 mg  Intramuscular Q6H PRN HOLLI Lion      diphenhydrAMINE-zinc acetate   Topical BID PRN HOLLI Lion      ergocalciferol  50,000 Units Oral Weekly HOLLI Galvan      escitalopram  20 mg Oral Daily HOLLI Lion      haloperidol  1 mg Oral Q6H PRN HOLLI Lion      haloperidol  2.5 mg Oral Q4H PRN Max 4/day HOLLI Lion      haloperidol  5 mg Oral Q4H PRN Max 4/day HOLLI Lion      hydrocortisone   Topical 4x Daily PRN HOLLI Lion      hydrOXYzine HCL  100 mg Oral Q6H PRN Max 4/day [facility] Holter, DO      Or    LORazepam  2 mg Intramuscular Q6H PRN [facility] Holter, DO      hydrOXYzine HCL  100 mg Oral Q6H PRN Max 4/day HOLLI Lion      Or    LORazepam  2 mg Intramuscular Q6H PRN HOLLI Lion      hydrOXYzine HCL  25 mg Oral Q6H PRN Max 4/day [facility] Holter, DO      ibuprofen  600 mg Oral Q8H PRN HOLLI Lion      melatonin  3 mg Oral HS [facility] Holter, DO      metFORMIN  500 mg Oral Daily With Breakfast HOLLI Lion      methocarbamol  500 mg Oral Q6H PRN HOLLI Lion      nicotine polacrilex  2 mg Oral Q2H PRN [facility] Holter, DO      OLANZapine  5 mg Oral Q4H PRN Max 3/day [facility] Holter, DO      Or    OLANZapine  2.5 mg Intramuscular Q4H PRN Max 3/day [facility] Holter, DO      OLANZapine  5 mg Oral Q3H PRN Max 3/day [facility] Holter, DO      Or    OLANZapine  5 mg Intramuscular Q3H PRN Max 3/day [facility] Holter, DO      OLANZapine  2.5 mg Oral Q4H PRN Max 6/day [facility] Holter, DO      ondansetron  4 mg Oral Q6H PRN HOLLI Lion      polyethylene glycol  17 g Oral Daily HOLLI Lion      polyethylene glycol  17 g Oral Daily PRN [facility] Holter, DO      propranolol  10 mg Oral Q12H KAYLIE HOLLI Lion      risperiDONE  2 mg Oral HS Bora Rosario MD      senna-docusate sodium  1 tablet Oral Daily PRN Jordan C Holter, DO senna-docusate sodium  2 tablet Oral BID HOLLI Lion      traZODone  50 mg Oral HS  HOLLI Clark      white petrolatum-mineral oil   Topical TID HOLLI Clark         Counseling / Coordination of Care: Total floor / unit time spent today 15 minutes. Greater than 50% of total time was spent with the patient and / or family counseling and / or somewhat receptive to supportive listening and teaching positive coping skills to deal with symptom mangement.     Patient's Rights, confidentiality and exceptions to confidentiality, use of automated medical record, Behavioral Health Services staff access to medical record, and consent to treatment reviewed.    This note has been dictated and hence there may be problems with punctuation, spelling and formatting and if anyone has any concerns please address them to Dr. Rosario   This note is not shared with patient due to potential for making patient's condition worse by knowing the content of the note.

## 2024-04-26 NOTE — ANESTHESIA POSTPROCEDURE EVALUATION
Post-Op Assessment Note    CV Status:  Stable  Pain Score: 0    Pain management: adequate       Mental Status:  Alert and awake   Hydration Status:  Euvolemic   PONV Controlled:  Controlled   Airway Patency:  Patent     Post Op Vitals Reviewed: Yes    No anethesia notable event occurred.    Staff: Anesthesiologist, CRNA           BP   187/106   Temp      Pulse  110   Resp   14   SpO2   95

## 2024-04-26 NOTE — PROGRESS NOTES
"Progress Note - Behavioral Health   Alberto Berumen 28 y.o. male MRN: [id number]  Unit/Bed#: [facility] 101-02 Encounter: [id number]    Assessment/Plan   Principal Problem:    Schizoaffective disorder, bipolar type ([facility])  Active Problems:    GERD (gastroesophageal reflux disease)    Medical clearance for psychiatric admission    Tobacco abuse    T wave inversion in EKG    Chronic idiopathic constipation    Confluent and reticulate papillomatosis    Primary hypertension    Elevated hemoglobin A1c      Behavior over the last 24 hours:  improved  Sleep: hypersomnia  Appetite: normal  Medication side effects: No  ROS: no complaints    Subjective: Patient is a 28 year old male, with schizoaffective disorder, bipolar type. He attended 2/10 groups yesterday. He had ECT this morning, and states that it \"went good.\" He states that the multiple voices in his head are not bothering him, and that ECT is still allowing him to \"get out of his head.\" He expresses that he feels paranoid about the voices. He missed his virtual visit with one of his sisters yesterday at 1 pm as he slept through it. He expressed to staff that he felt \"a little depressed\" but would not elaborate on those feelings. Able to make his needs known. He denies feelings of agitation, or aggressive behaviors. He states that he slept through the night last night without any disturbances or nightmares. He expresses that he still feels slightly depressed today, he denies any triggering events or stressors. He states that he \"just feels low.\" He denies feelings of guilt, hopelessness, worthlessness, or thoughts of suicide. Denies homicidal ideations.     Assessment: Patient is improving with coping with auditory hallucinations though they remain to be present. Appears to be improving with use of ECT.     Mental Status Evaluation:  Appearance:  age appropriate, casually dressed, and is wearing hospital attire -- a grey patient gown with teal loose pants and socks. He is " "well-groomed, has appropriate hygiene, and has black hair with dreadlocks. He sits hunched over.   Behavior:  psychomotor retardation, cooperative, pleasant, polite, makes good eye contact.    Speech:  increased latency of response, normal volume. No speech impediments.    Mood:  \"Pretty good\"    Affect:  blunted and mood-congruent. Blunted affect but he occasionally laughs at appropriate times in conversation. Appropriate affect to content of conversation.    Thought Process:  normal and logical   Associations: intact associations   Thought Content:  delusions  persecutory; he expresses paranoia toward the multiple voices he hears in his head. Denies any experiences leading to grandiose, somatic, or bizarre delusions. Does not appear to be hypervigilant. Denies SI, HI, obsessions, compulsions, or body distortion perceptions. Denies ideas of reference.    Perceptual Disturbances: Auditory hallucinations without commands. He states that the multiple voices remain present constantly and still threaten to harm him and his family, however he states that he is able to control them and \"get out of his head\" to be not bothered by the voices. He denies visual, olfactory, tactile, or gustatory hallucinations. Denies illusions, feelings of depersonalization or derealization. He does not appear to be responding to internal stimuli.   Risk Potential: Suicidal Ideations none  Homicidal Ideations none  Potential for Aggression No   Sensorium:  person, place, time/date, situation, and day of week   Memory:  recent memory intact, remote memory intact, immediate memory intact  Immediate memory: able to relay 3 unrelated words (table, hood, green)  Recent memory: able to express what he had for breakfast today; able to recall the 3 unrelated words with some guidance; able to recall the former [place] president  Remote memory: able to recall the name of his elementary school and his favorite class in school (math)    Consciousness:  alert " "   Attention: attention span and concentration were age appropriate  Attention span: able to repeat a phone number without error ([id number])  Concentration: able to spell \"table\" forwards, he forgets one letter while spelling \"table\" backwards (elat)   Intelligence: Fund of knowledge: able to recall the last 4 presidents of [place]  Calculation ability: able to relay how many quarters are in a dollar  Problem solving: able to express why the moon appears larger than the stars  Abstract interpretation: able to express similarity between kiwi and banana (both fruit)    Insight:  age appropriate; he is aware of his diagnosis and the need for therapy and medications   Judgment: age appropriate; he states that he would run out if someone yelled \"fire\" in a movie theatre  Social judgment: he is dressed appropriately and acts appropriately   Impulse control: Does not appear to have poor impulse control   Gait/Station: normal gait/station and slow   Motor Activity: no abnormal movements     Progress Toward Goals: He appears to be improving at controlling voices while on ECT. He does express paranoia toward the voices today.     Recommended Treatment: Continue with group therapy, milieu therapy and occupational therapy.      Discharge plan: Plan is to discharge to his aunt's house with an ACT team established to ensure he is caring for himself appropriately and is taking medications    Risks, benefits and possible side effects of Medications:   Risks, benefits, and possible side effects of medications explained to patient and patient verbalizes understanding.      Medications: all current active meds have been reviewed.    Labs: I have personally reviewed all pertinent laboratory/tests results.     Counseling / Coordination of Care  Total floor / unit time spent today 20 minutes. Greater than 50% of total time was spent with the patient and / or family counseling and / or coordination of care.   "

## 2024-04-26 NOTE — PLAN OF CARE
Problem: Ineffective Coping  Goal: Identifies ineffective coping skills  Outcome: Progressing  Goal: Identifies healthy coping skills  Outcome: Progressing  Goal: Demonstrates healthy coping skills  Outcome: Progressing  Goal: Participates in unit activities  Description: Interventions:  - Provide therapeutic environment   - Provide required programming   - Redirect inappropriate behaviors   Outcome: Jasmina Dominguez has attended 22/36 groups offered during the past 4 days.

## 2024-04-26 NOTE — PLAN OF CARE
Problem: Alteration in Thoughts and Perception  Goal: Refrain from acting on delusional thinking/internal stimuli  Description: Interventions:  - Monitor patient closely, per order   - Utilize least restrictive measures   - Set reasonable limits, give positive feedback for acceptable   - Administer medications as ordered and monitor of potential side effects  Outcome: Progressing  Goal: Agree to be compliant with medication regime, as prescribed and report medication side effects  Description: Interventions:  - Offer appropriate PRN medication and supervise ingestion; conduct AIMS, as needed   Outcome: Progressing  Goal: Recognize dysfunctional thoughts, communicate reality-based thoughts at the time of discharge  Description: Interventions:  - Provide medication and psycho-education to assist patient in compliance and developing insight into his/her illness   Outcome: Progressing  Goal: Complete daily ADLs, including personal hygiene independently, as able  Description: Interventions:  - Observe, teach, and assist patient with ADLS  - Monitor and promote a balance of rest/activity, with adequate nutrition and elimination   Outcome: Progressing     Problem: Ineffective Coping  Goal: Identifies ineffective coping skills  Outcome: Progressing  Goal: Identifies healthy coping skills  Outcome: Progressing  Goal: Demonstrates healthy coping skills  Outcome: Progressing     Problem: Depression  Goal: Refrain from harming self  Description: Interventions:  - Monitor patient closely, per order   - Supervise medication ingestion, monitor effects and side effects   Outcome: Progressing  Goal: Refrain from isolation  Description: Interventions:  - Develop a trusting relationship   - Encourage socialization   Outcome: Progressing  Goal: Attend and participate in unit activities, including therapeutic, recreational, and educational groups  Description: Interventions:  - Provide therapeutic and educational activities daily,  encourage attendance and participation, and document same in the medical record   Outcome: Progressing  Goal: Complete daily ADLs, including personal hygiene independently, as able  Description: Interventions:  - Observe, teach, and assist patient with ADLS  -  Monitor and promote a balance of rest/activity, with adequate nutrition and elimination   Outcome: Progressing     Problem: Anxiety  Goal: Anxiety is at manageable level  Description: Interventions:  - Assess and monitor patient's anxiety level.   - Monitor for signs and symptoms (heart palpitations, chest pain, shortness of breath, headaches, nausea, feeling jumpy, restlessness, irritable, apprehensive).   - Collaborate with interdisciplinary team and initiate plan and interventions as ordered.  - [place] patient to unit/surroundings  - Explain treatment plan  - Encourage participation in care  - Encourage verbalization of concerns/fears  - Identify coping mechanisms  - Assist in developing anxiety-reducing skills  - Administer/offer alternative therapies  - Limit or eliminate stimulants  Outcome: Progressing     Problem: Alteration in Thoughts and Perception  Goal: Treatment Goal: Gain control of psychotic behaviors/thinking, reduce/eliminate presenting symptoms and demonstrate improved reality functioning upon discharge  Outcome: Not Progressing  Goal: Attend and participate in unit activities, including therapeutic, recreational, and educational groups  Description: Interventions:  -Encourage Visitation and family involvement in care  Outcome: Not Progressing     Problem: Depression  Goal: Treatment Goal: Demonstrate behavioral control of depressive symptoms, verbalize feelings of improved mood/affect, and adopt new coping skills prior to discharge  Outcome: Not Progressing

## 2024-04-26 NOTE — NURSING NOTE
"Pt is intermittently visible on the unit but isolative to self. Consumed 100% of dinner. Took medications without incidence but refused atropine drops at bedtime. Pt is pleasant and cooperative. Reports feeling \"very tired\" after ECT this morning and spent some of the evening napping. Attended most evening groups. Pt reports the voices are there but not bothering him and denies other psych symptoms. No behavioral issues. Pt offers no concerns or complaints. VSS. Continuous safety checks maintained.  "

## 2024-04-26 NOTE — PROCEDURES
"Procedure Note - ECT  Alberto Berumen 28 y.o. male MRN: [id number]    Time out was taken with staff to confirm correct patient and correct procedure to be performed. Prior to starting the procedure, the patient's questions and concerns were addressed. Patient reports that ECT has helped him to \"stay out of his head\".  Patient denies side effects since last session of ECT. Today, patient denies SI, HI, AVH. Patient agreed to continuing treatment. Stimulus dose was 100% . Patient had a satisfactory seizure. No immediate side effects were noted after the procedure.     This procedure was performed in the presence of and under the direct supervision of Dr. Arroyo.    Session Number: Maintenance    Diagnosis: Principal Problem:    Schizoaffective disorder, bipolar type ([facility])  Active Problems:    GERD (gastroesophageal reflux disease)    Medical clearance for psychiatric admission    Tobacco abuse    T wave inversion in EKG    Chronic idiopathic constipation    Confluent and reticulate papillomatosis    Primary hypertension    Elevated hemoglobin A1c      ECT Type: Maintenance    Anesthesia: Etomidate :    Electrode Placement: Bilateral    Energy level:  100 %      Seizure Duration     EE Sec. (visual)    EMG : 33 Sec (visual)    Post-ictal Suppression Index: 84.2 %    Results:Clinical seizure was satisfactory, Patient tolerated ECT well      Vitals:    2416   BP: 154/71   Pulse: 103   Resp: 18   Temp: 98.2 °F (36.8 °C)   SpO2: 98%        Medication Administration - last 24 hours from 2024 to 2024         Date/Time Order Dose Route Action Action by     2024 0603 EDT amLODIPine (NORVASC) tablet 5 mg 5 mg Oral Given Arin Travis RN     2024 EDT atropine (ISOPTO ATROPINE) 1 % ophthalmic solution 1 drop 1 drop Sublingual Not Given Mare Berry RN     2024 EDT escitalopram (LEXAPRO) tablet 20 mg 20 mg Oral Given Guy Taylor     2024 EDT metFORMIN " (GLUCOPHAGE) tablet 500 mg 500 mg Oral Given [facility]     04/26/2024 0824 EDT polyethylene glycol (MIRALAX) packet 17 g 17 g Oral Given [facility]     04/26/2024 0604 EDT propranolol (INDERAL) tablet 10 mg 10 mg Oral Given Arin Travis RN     04/25/2024 2122 EDT propranolol (INDERAL) tablet 10 mg 10 mg Oral Given Mare Berry RN     04/26/2024 0823 EDT senna-docusate sodium (SENOKOT S) 8.6-50 mg per tablet 2 tablet 2 tablet [facility]     04/25/2024 1717 EDT senna-docusate sodium (SENOKOT S) 8.6-50 mg per tablet 2 tablet 2 tablet Oral Given Mare Berry RN     04/25/2024 2122 EDT melatonin tablet 3 mg 3 mg Oral Given Mare Berry RN     04/26/2024 0824 EDT nicotine polacrilex (NICORETTE) gum 2 mg 2 mg Oral Given Guy Taylor     04/25/2024 1953 EDT nicotine polacrilex (NICORETTE) gum 2 mg 2 mg Oral Given Mare Berry RN     04/25/2024 1717 EDT nicotine polacrilex (NICORETTE) gum 2 mg 2 mg Oral Given Mare Berry RN     04/25/2024 1431 EDT nicotine polacrilex (NICORETTE) gum 2 mg 2 mg Oral Given Britni Lucas RN     04/26/2024 0823 EDT cyanocobalamin (VITAMIN B-12) tablet 1,000 mcg 1,000 mcg Oral Given Guy Claudia     04/25/2024 2122 EDT risperiDONE (RisperDAL) tablet 2 mg 2 mg Oral Given Mare Berry RN     04/25/2024 2122 EDT cloZAPine (CLOZARIL) tablet 450 mg 450 mg Oral Given MIGUEL Amaro DO 04/26/24  Psychiatry Resident, PGY- I

## 2024-04-26 NOTE — PROGRESS NOTES
04/26/24 0725   Team Meeting   Meeting Type Daily Rounds   Team Members Present   Team Members Present Physician;Nurse;;Other (Discipline and Name)   Patient/Family Present   Patient Present No   Patient's Family Present No     In attendance:  Dr. Alex Thomas, MD Dr. Jordan Holter, DO Eveline Hunt, HOLLI Taylor, RN  Luisana Alvarado, [facility]  Carla Lux, W  MATILDA Daniel.S.    Groups: 2/10    Pt had ECT this morning. Pt was asleep during time set for virtual visit yesterday; visit rescheduled to Sunday 4/28 at 2pm. No bx issues noted.

## 2024-04-26 NOTE — NURSING NOTE
Patient tolerates ECT well today,eatring 100% of breakfast. Pt is visible on the unit, keeps to self but pleasant with interactions. Pt reports no s/s, offers no complaints. Pt takes medications without issue.

## 2024-04-26 NOTE — NURSING NOTE
"Pt is present on the milieu and social with select peers. He consumed 100 % of dinner. Took his medications without incidence.Refused HS atropine gtts, pt said \"they don't work\". Nicorette gum given at 1431, 1717, and 1953. Pt is polite, pleasant, and cooperative. Pt endorses depression and AH, but denied all other psychiatric symptoms. Pt showered for upcoming ECT in the morning. Attended Wrap up group and played cards with peers. No behavioral issues.   "

## 2024-04-27 PROCEDURE — 99232 SBSQ HOSP IP/OBS MODERATE 35: CPT | Performed by: NURSE PRACTITIONER

## 2024-04-27 RX ADMIN — NICOTINE POLACRILEX 2 MG: 2 GUM, CHEWING BUCCAL at 17:18

## 2024-04-27 RX ADMIN — CLOZAPINE 450 MG: 25 TABLET ORAL at 21:11

## 2024-04-27 RX ADMIN — NICOTINE POLACRILEX 2 MG: 2 GUM, CHEWING BUCCAL at 21:11

## 2024-04-27 RX ADMIN — ESCITALOPRAM OXALATE 20 MG: 10 TABLET, FILM COATED ORAL at 08:33

## 2024-04-27 RX ADMIN — NICOTINE POLACRILEX 2 MG: 2 GUM, CHEWING BUCCAL at 08:33

## 2024-04-27 RX ADMIN — SENNOSIDES AND DOCUSATE SODIUM 2 TABLET: 8.6; 5 TABLET ORAL at 08:33

## 2024-04-27 RX ADMIN — AMLODIPINE BESYLATE 5 MG: 5 TABLET ORAL at 08:33

## 2024-04-27 RX ADMIN — PROPRANOLOL HYDROCHLORIDE 10 MG: 10 TABLET ORAL at 08:33

## 2024-04-27 RX ADMIN — MELATONIN TAB 3 MG 3 MG: 3 TAB at 21:12

## 2024-04-27 RX ADMIN — METFORMIN HYDROCHLORIDE 500 MG: 500 TABLET ORAL at 08:30

## 2024-04-27 RX ADMIN — ATROPINE SULFATE 1 DROP: 10 SOLUTION/ DROPS OPHTHALMIC at 21:56

## 2024-04-27 RX ADMIN — SENNOSIDES AND DOCUSATE SODIUM 2 TABLET: 8.6; 5 TABLET ORAL at 17:15

## 2024-04-27 RX ADMIN — PROPRANOLOL HYDROCHLORIDE 10 MG: 10 TABLET ORAL at 21:11

## 2024-04-27 RX ADMIN — RISPERIDONE 2 MG: 2 TABLET, FILM COATED ORAL at 21:11

## 2024-04-27 RX ADMIN — CYANOCOBALAMIN TAB 1000 MCG 1000 MCG: 1000 TAB at 08:33

## 2024-04-27 RX ADMIN — NICOTINE POLACRILEX 2 MG: 2 GUM, CHEWING BUCCAL at 14:16

## 2024-04-27 RX ADMIN — POLYETHYLENE GLYCOL 3350 17 G: 17 POWDER, FOR SOLUTION ORAL at 08:36

## 2024-04-27 NOTE — NURSING NOTE
Pt in bed most of am except up for breakfast. Pt currently alert, cooperative and visible intermittently pacing the unit. No SI or HI noted. Denies depression, anxiety and pain. Did not attended any groups. Consumed 100% of breakfast and refused lunch. Took all medication without prompting. Nicotine gum administered as ordered. Maintained on safe precautions without incident. Will continue to monitor progress and recovery program.

## 2024-04-27 NOTE — NURSING NOTE
Alert, cooperative and visible intermittently. Consumed 100% of dinner. Took all medication without prompting. Nicotine gum administered as ordered. Maintained on safe precautions without incident.

## 2024-04-27 NOTE — PLAN OF CARE
Problem: Alteration in Thoughts and Perception  Goal: Treatment Goal: Gain control of psychotic behaviors/thinking, reduce/eliminate presenting symptoms and demonstrate improved reality functioning upon discharge  Outcome: Progressing  Goal: Verbalize thoughts and feelings  Description: Interventions:  - Promote a nonjudgmental and trusting relationship with the patient through active listening and therapeutic communication  - Assess patient's level of functioning, behavior and potential for risk  - Engage patient in 1 on 1 interactions  - Encourage patient to express fears, feelings, frustrations, and discuss symptoms    - [place]yx patient to reality, help patient recognize reality-based thinking   - Administer medications as ordered and assess for potential side effects  - Provide the patient education related to the signs and symptoms of the illness and desired effects of prescribed medications  Outcome: Progressing  Goal: Refrain from acting on delusional thinking/internal stimuli  Description: Interventions:  - Monitor patient closely, per order   - Utilize least restrictive measures   - Set reasonable limits, give positive feedback for acceptable   - Administer medications as ordered and monitor of potential side effects  Outcome: Progressing  Goal: Agree to be compliant with medication regime, as prescribed and report medication side effects  Description: Interventions:  - Offer appropriate PRN medication and supervise ingestion; conduct AIMS, as needed   Outcome: Progressing     Problem: Ineffective Coping  Goal: Demonstrates healthy coping skills  Outcome: Progressing  Goal: Participates in unit activities  Description: Interventions:  - Provide therapeutic environment   - Provide required programming   - Redirect inappropriate behaviors   Outcome: Progressing     Problem: Depression  Goal: Treatment Goal: Demonstrate behavioral control of depressive symptoms, verbalize feelings of improved mood/affect, and  adopt new coping skills prior to discharge  Outcome: Progressing  Goal: Refrain from isolation  Description: Interventions:  - Develop a trusting relationship   - Encourage socialization   Outcome: Progressing  Goal: Refrain from self-neglect  Outcome: Progressing  Goal: Attend and participate in unit activities, including therapeutic, recreational, and educational groups  Description: Interventions:  - Provide therapeutic and educational activities daily, encourage attendance and participation, and document same in the medical record   Outcome: Progressing  Goal: Complete daily ADLs, including personal hygiene independently, as able  Description: Interventions:  - Observe, teach, and assist patient with ADLS  -  Monitor and promote a balance of rest/activity, with adequate nutrition and elimination   Outcome: Progressing     Problem: Anxiety  Goal: Anxiety is at manageable level  Description: Interventions:  - Assess and monitor patient's anxiety level.   - Monitor for signs and symptoms (heart palpitations, chest pain, shortness of breath, headaches, nausea, feeling jumpy, restlessness, irritable, apprehensive).   - Collaborate with interdisciplinary team and initiate plan and interventions as ordered.  - [place] patient to unit/surroundings  - Explain treatment plan  - Encourage participation in care  - Encourage verbalization of concerns/fears  - Identify coping mechanisms  - Assist in developing anxiety-reducing skills  - Administer/offer alternative therapies  - Limit or eliminate stimulants  Outcome: Progressing     Problem: Alteration in Orientation  Goal: Attend and participate in unit activities, including therapeutic, recreational, and educational groups  Description: Interventions:  - Provide therapeutic and educational activities daily, encourage attendance and participation, and document same in the medical record   - Provide appropriate opportunities for reminiscence   - Provide a consistent daily  routine   - Encourage family contact/visitation   Outcome: Progressing  Goal: Complete daily ADLs, including personal hygiene independently, as able  Description: Interventions:  - Observe, teach, and assist patient with ADLS  Outcome: Progressing     Problem: Electroconvulsive therapy (ECT)  Goal: Treatment Goal: Demonstrate a reduction of confusion and improved orientation to person, place, time and/or situation upon discharge, according to optimum baseline/ability  Outcome: Progressing  Goal: Achieves stable or improved neurological status  Description: INTERVENTIONS  - Monitor and report changes in neurological status  - Monitor vital signs such as temperature, blood pressure, glucose, and any other labs ordered   - Initiate measures to prevent increased intracranial pressure  - Monitor for seizure activity and implement precautions if appropriate      Outcome: Progressing  Goal: Maintain or return mobility to safest level of function  Description: INTERVENTIONS:  - Assess patient's ability to carry out ADLs; assess patient's baseline for ADL function and identify physical deficits which impact ability to perform ADLs (bathing, care of mouth/teeth, toileting, grooming, dressing, etc.)  - Assess/evaluate cause of self-care deficits   - Assess range of motion  - Assess patient's mobility  - Assess patient's need for assistive devices and provide as appropriate  - Encourage maximum independence but intervene and supervise when necessary  - Involve family in performance of ADLs  - Assess for home care needs following discharge   - Consider OT consult to assist with ADL evaluation and planning for discharge  - Provide patient education as appropriate  Outcome: Progressing  Goal: Maintains adequate nutritional intake  Description: INTERVENTIONS:  - Monitor percentage of each meal consumed  - Identify factors contributing to decreased intake, treat as appropriate  - Assist with meals as needed  - Monitor I&O, weight,  and lab values if indicated  - Obtain nutrition services referral as needed  Outcome: Progressing     Problem: DISCHARGE PLANNING - CARE MANAGEMENT  Goal: Discharge to post-acute care or home with appropriate resources  Description: INTERVENTIONS:  - Conduct assessment to determine patient/family and health care team treatment goals, and need for post-acute services based on payer coverage, community resources, and patient preferences, and barriers to discharge  - Address psychosocial, clinical, and financial barriers to discharge as identified in assessment in conjunction with the patient/family and health care team  - Arrange appropriate level of post-acute services according to patient’s   needs and preference and payer coverage in collaboration with the physician and health care team  - Communicate with and update the patient/family, physician, and health care team regarding progress on the discharge plan  - Arrange appropriate transportation to post-acute venues  Outcome: Progressing

## 2024-04-27 NOTE — PROGRESS NOTES
"Progress Note - Behavioral Health   Alberto Berumen 28 y.o. male MRN: [id number]  Unit/Bed#: [facility] 101-02 Encounter: [id number]      Subjective:     Documentation, nursing notes, medication reconciliation, labs, and vitals reviewed. Patient was seen for continuing care and reviewed with treatment team.  No acute events over the past 24 hours. Per nursing report, patient remains visible and quiet in the milieu. No medication changes over the past 24 hours.     On evaluation today, Alberto is laying in bed covered in multiple blankets, agreeable to interview and polite.  Reports that ECT has gone well noting that he no longer has a headache.  Depression is improving.  Rates depression at 4 out of 10 (10 being worst) which is an improvement from 8 out of 10, where it was in the past.  He does not feel anxious.  Reports that auditory hallucinations are decreasing, states \"I am not in my head as much\".  No visual hallucinations.  Does not appear internally preoccupied at time of encounter.  He has plans to get out of bed later today and attend groups.  Last bowel movement yesterday. Continues to tolerate current medications with no adverse effects. No self-harming/suicidal ideation, plan, or intent upon direct inquiry. No thoughts to harm others.  No agitation or aggression noted. Does not appear overtly manic. Offers no further complaints.       Psychiatric ROS:  Behavior over the last 24 hours: unchanged  Sleep: normal  Appetite: normal  Medication side effects: No   ROS: all other systems are negative      Mental Status Evaluation:    Appearance:  wearing hospital clothes, laying in bed covered in multiple blankets, dreadlocks   Behavior:  pleasant, cooperative, calm, polite   Speech:  normal rate, normal volume, normal pitch   Mood:  less anxious, less depressed   Affect:  blunted, mood-congruent   Thought Process:  organized, logical, goal directed, linear   Associations: intact associations   Thought Content:  no " overt delusions   Perceptual Disturbances: AH, decreased, no VH, no delusions elicited    Risk Potential: Suicidal ideation - None at present  Homicidal ideation - None  Potential for aggression - No   Sensorium:  oriented to person, place, and time/date   Memory:  recent and remote memory grossly intact   Consciousness:  alert and awake   Attention/Concentration: attention span and concentration are age appropriate   Insight:  fair   Judgment: fair   Gait/Station: in bed   Motor Activity: no abnormal movements       Vital signs in last 24 hours:    Temp:  [97.7 °F (36.5 °C)] 97.7 °F (36.5 °C)  HR:  [77-99] 77  Resp:  [18] 18  BP: (120-150)/(64-77) 120/64    Laboratory results: I have personally reviewed all pertinent laboratory/tests results    Results from the past 24 hours: No results found for this or any previous visit (from the past 24 hour(s)).      Progress Toward Goals: progressing slowly    Suicide/Homicide Risk Assessment:    Risk of Harm to Self:   Nursing Suicide Risk Assessment Last 24 hours: C-SSRS Risk (Since Last Contact)  Calculated C-SSRS Risk Score (Since Last Contact): No Risk Indicated    Risk of Harm to Others:  Nursing Homicide Risk Assessment: Violence Risk to Others: Denies within past 6 months    Assessment/Plan   Principal Problem:    Schizoaffective disorder, bipolar type ([facility])  Active Problems:    GERD (gastroesophageal reflux disease)    Medical clearance for psychiatric admission    Tobacco abuse    T wave inversion in EKG    Chronic idiopathic constipation    Confluent and reticulate papillomatosis    Primary hypertension    Elevated hemoglobin A1c      Recommended Treatment:     Planned medication and treatment changes:    All current active medications have been reviewed  Encourage group therapy, milieu therapy and occupational therapy  Behavioral Health checks every 7 minutes  Continue ECT  Continue with SLIM medical management as indicated  Disposition planning  ongoing    Continue current medications:    Current Facility-Administered Medications   Medication Dose Route Frequency Provider Last Rate    acetaminophen  650 mg Oral Q4H PRN Jordan C Holter, DO      acetaminophen  650 mg Oral Q6H PRN HOLLI Lion      aluminum-magnesium hydroxide-simethicone  30 mL Oral Q4H PRN Jordan C Holter, DO      amLODIPine  5 mg Oral Daily HOLLI Lion      Artificial Tears  1 drop Both Eyes Q3H PRN Jordan C Holter, DO      atropine  1 drop Sublingual HS HOLLI Lion      atropine  1 drop Sublingual Daily PRN HOLLI Lion      haloperidol lactate  2.5 mg Intramuscular Q4H PRN Max 4/day HOLLI Lion      And    LORazepam  1 mg Intramuscular Q4H PRN Max 4/day HOLLI Lion      And    benztropine  0.5 mg Intramuscular Q4H PRN Max 4/day HOLLI Lion      benztropine  1 mg Intramuscular Q4H PRN Max 6/day Jordan C Holter, DO      haloperidol lactate  5 mg Intramuscular Q4H PRN Max 4/day HOLLI Lion      And    LORazepam  2 mg Intramuscular Q4H PRN Max 4/day HOLLI Lion      And    benztropine  1 mg Intramuscular Q4H PRN Max 4/day HOLLI Lion      benztropine  1 mg Oral Q4H PRN Max 6/day HOLLI Lion      benztropine  1 mg Oral Q4H PRN Max 6/day Jordan C Holter, DO      bisacodyl  10 mg Rectal Daily PRN HOLLI Lion      cloZAPine  450 mg Oral HS Bora Rosario MD      cyanocobalamin  1,000 mcg Oral Daily HOLLI Galvan      hydrOXYzine HCL  50 mg Oral Q6H PRN Max 4/day Jordan C Holter, DO      Or    diphenhydrAMINE  50 mg Intramuscular Q6H PRN Jordan C Holter, DO      hydrOXYzine HCL  50 mg Oral Q6H PRN Max 4/day HOLLI Lion      Or    diphenhydrAMINE  50 mg Intramuscular Q6H PRN HOLLI Lion      diphenhydrAMINE-zinc acetate   Topical BID PRN HOLLI Lion      ergocalciferol  50,000 Units Oral Weekly HOLLI Galvan      escitalopram  20 mg Oral Daily HOLLI Lion       haloperidol  1 mg Oral Q6H PRN HOLLI Lion      haloperidol  2.5 mg Oral Q4H PRN Max 4/day HOLLI Lion      haloperidol  5 mg Oral Q4H PRN Max 4/day HOLLI Lion      hydrocortisone   Topical 4x Daily PRN HOLLI Lion      hydrOXYzine HCL  100 mg Oral Q6H PRN Max 4/day [facility] Holter, DO      Or    LORazepam  2 mg Intramuscular Q6H PRN [facility] Holter, DO      hydrOXYzine HCL  100 mg Oral Q6H PRN Max 4/day HOLLI Lion      Or    LORazepam  2 mg Intramuscular Q6H PRN HOLLI Lion      hydrOXYzine HCL  25 mg Oral Q6H PRN Max 4/day [facility] Holter, DO      ibuprofen  600 mg Oral Q8H PRN HOLLI Lion      melatonin  3 mg Oral HS [facility] Holter, DO      metFORMIN  500 mg Oral Daily With Breakfast HOLLI Lion      methocarbamol  500 mg Oral Q6H PRN HOLLI Lion      nicotine polacrilex  2 mg Oral Q2H PRN [facility] Holter, DO      OLANZapine  5 mg Oral Q4H PRN Max 3/day [facility] Holter, DO      Or    OLANZapine  2.5 mg Intramuscular Q4H PRN Max 3/day [facility] Holter, DO      OLANZapine  5 mg Oral Q3H PRN Max 3/day [facility] Holter, DO      Or    OLANZapine  5 mg Intramuscular Q3H PRN Max 3/day [facility] Holter, DO      OLANZapine  2.5 mg Oral Q4H PRN Max 6/day [facility] Holter, DO      ondansetron  4 mg Oral Q6H PRN HOLLI Lion      polyethylene glycol  17 g Oral Daily HOLLI Lion      polyethylene glycol  17 g Oral Daily PRN [facility] Holter, DO      propranolol  10 mg Oral Q12H KAYLIE HOLLI Lion      risperiDONE  2 mg Oral HS Bora Rosario MD      senna-docusate sodium  1 tablet Oral Daily PRN [facility] Holter, DO      senna-docusate sodium  2 tablet Oral BID HOLLI Lion      traZODone  50 mg Oral HS PRN HOLLI Lion      white petrolatum-mineral oil   Topical TID PRN HOLLI Lion           Risks / Benefits of Treatment:    Risks, benefits, and possible side effects of medications explained to patient and patient verbalizes  understanding and agreement for treatment.    Counseling / Coordination of Care:    Patient's progress discussed with staff in treatment team meeting.  Medications, treatment progress and treatment plan reviewed with patient.    Note Share    This note was not shared with the patient due to reasonable likelihood of causing patient harm    HOLLI Ghotra 04/27/24

## 2024-04-28 PROCEDURE — 99232 SBSQ HOSP IP/OBS MODERATE 35: CPT | Performed by: NURSE PRACTITIONER

## 2024-04-28 RX ADMIN — ATROPINE SULFATE 1 DROP: 10 SOLUTION/ DROPS OPHTHALMIC at 21:57

## 2024-04-28 RX ADMIN — ESCITALOPRAM OXALATE 20 MG: 10 TABLET, FILM COATED ORAL at 09:15

## 2024-04-28 RX ADMIN — SENNOSIDES AND DOCUSATE SODIUM 2 TABLET: 8.6; 5 TABLET ORAL at 17:33

## 2024-04-28 RX ADMIN — AMLODIPINE BESYLATE 5 MG: 5 TABLET ORAL at 09:15

## 2024-04-28 RX ADMIN — PROPRANOLOL HYDROCHLORIDE 10 MG: 10 TABLET ORAL at 21:17

## 2024-04-28 RX ADMIN — NICOTINE POLACRILEX 2 MG: 2 GUM, CHEWING BUCCAL at 17:33

## 2024-04-28 RX ADMIN — NICOTINE POLACRILEX 2 MG: 2 GUM, CHEWING BUCCAL at 09:15

## 2024-04-28 RX ADMIN — SENNOSIDES AND DOCUSATE SODIUM 2 TABLET: 8.6; 5 TABLET ORAL at 09:15

## 2024-04-28 RX ADMIN — NICOTINE POLACRILEX 2 MG: 2 GUM, CHEWING BUCCAL at 13:52

## 2024-04-28 RX ADMIN — PROPRANOLOL HYDROCHLORIDE 10 MG: 10 TABLET ORAL at 09:15

## 2024-04-28 RX ADMIN — RISPERIDONE 2 MG: 2 TABLET, FILM COATED ORAL at 21:18

## 2024-04-28 RX ADMIN — NICOTINE POLACRILEX 2 MG: 2 GUM, CHEWING BUCCAL at 11:34

## 2024-04-28 RX ADMIN — CYANOCOBALAMIN TAB 1000 MCG 1000 MCG: 1000 TAB at 09:15

## 2024-04-28 RX ADMIN — MELATONIN TAB 3 MG 3 MG: 3 TAB at 21:18

## 2024-04-28 RX ADMIN — METFORMIN HYDROCHLORIDE 500 MG: 500 TABLET ORAL at 09:15

## 2024-04-28 RX ADMIN — POLYETHYLENE GLYCOL 3350 17 G: 17 POWDER, FOR SOLUTION ORAL at 09:16

## 2024-04-28 RX ADMIN — NICOTINE POLACRILEX 2 MG: 2 GUM, CHEWING BUCCAL at 21:57

## 2024-04-28 RX ADMIN — CLOZAPINE 450 MG: 25 TABLET ORAL at 21:18

## 2024-04-28 NOTE — PROGRESS NOTES
"Progress Note - Behavioral Health   Alberto Berumen 28 y.o. male MRN: [id number]  Unit/Bed#: [facility] 101-02 Encounter: [id number]      Subjective:     Documentation, nursing notes, medication reconciliation, labs, and vitals reviewed. Patient was seen for continuing care and reviewed with treatment team.  No acute events over the past 24 hours. Per nursing report, patient remained in bed for most of the day, intermittently visible in the milieu; no groups. No medication changes over the past 24 hours.     On evaluation today, Alberto is laying in bed resting, covered in multiple blankets.  Reports mood as \"stable\" stating that depression has not worsened or improved since yesterday.  He denies SI. He plans on getting out of bed today and attending groups.  He is calm and polite.  Endorses chronic AH which he finds distressing.  ECT helps these symptoms.  As such, he is looking forward to next ECT treatment. He is tolerating medications. No self-harming/suicidal ideation, plan, or intent upon direct inquiry. No thoughts to harm others.  No agitation or aggression noted. Does not appear overtly manic. Offers no further complaints.       Psychiatric ROS:  Behavior over the last 24 hours: unchanged  Sleep: normal  Appetite: normal  Medication side effects: Yes - sialorrhea    ROS: all other systems are negative      Mental Status Evaluation:    Appearance:  Laying in bed, covered in multiple blankets   Behavior:  pleasant, cooperative, calm, good eye contact   Speech:  normal rate, normal pitch, soft   Mood:  not anxious, remains depressed   Affect:  blunted   Thought Process:  organized, logical, coherent, goal directed   Associations: intact associations   Thought Content:  no overt delusions   Perceptual Disturbances: auditory hallucinations, chronic, no visual hallucinations, does not appear responding to internal stimuli   Risk Potential: Suicidal ideation - None at present  Homicidal ideation - None at " present  Potential for aggression - No   Sensorium:  oriented to person, place, and time/date   Memory:  recent and remote memory grossly intact   Consciousness:  alert and awake   Attention/Concentration: attention span and concentration are age appropriate   Insight:  fair   Judgment: fair   Gait/Station: in bed   Motor Activity: no abnormal movements       Vital signs in last 24 hours:    HR:  [74-94] 94  BP: (141-166)/(89-99) 141/89    Laboratory results: I have personally reviewed all pertinent laboratory/tests results    Results from the past 24 hours: No results found for this or any previous visit (from the past 24 hour(s)).      Progress Toward Goals: no significant progress today    Suicide/Homicide Risk Assessment:    Risk of Harm to Self:   Nursing Suicide Risk Assessment Last 24 hours: C-SSRS Risk (Since Last Contact)  Calculated C-SSRS Risk Score (Since Last Contact): No Risk Indicated    Risk of Harm to Others:  Nursing Homicide Risk Assessment: Violence Risk to Others: Denies within past 6 months    Assessment/Plan   Principal Problem:    Schizoaffective disorder, bipolar type (HCC)  Active Problems:    GERD (gastroesophageal reflux disease)    Medical clearance for psychiatric admission    Tobacco abuse    T wave inversion in EKG    Chronic idiopathic constipation    Confluent and reticulate papillomatosis    Primary hypertension    Elevated hemoglobin A1c      Recommended Treatment:     Planned medication and treatment changes:    All current active medications have been reviewed  Encourage group therapy, milieu therapy and occupational therapy  Behavioral Health checks every 7 minutes  Continue ECT  Continue with [facility] medical management as indicated  Disposition planning ongoing  Continue current medications:    Current Facility-Administered Medications   Medication Dose Route Frequency Provider Last Rate    acetaminophen  650 mg Oral Q4H PRN Jordan C Holter, DO      acetaminophen  650 mg Oral Q6H  PRN HOLLI Lion      aluminum-magnesium hydroxide-simethicone  30 mL Oral Q4H PRN Jordan C Holter, DO      amLODIPine  5 mg Oral Daily HOLLI Lion      Artificial Tears  1 drop Both Eyes Q3H PRN Jordan C Holter, DO      atropine  1 drop Sublingual HS HOLLI Lion      atropine  1 drop Sublingual Daily PRN HOLLI Lion      haloperidol lactate  2.5 mg Intramuscular Q4H PRN Max 4/day STEVE LionNP      And    LORazepam  1 mg Intramuscular Q4H PRN Max 4/day STEVE LionNP      And    benztropine  0.5 mg Intramuscular Q4H PRN Max 4/day HOLLI Lion      benztropine  1 mg Intramuscular Q4H PRN Max 6/day Jordan C Holter, DO      haloperidol lactate  5 mg Intramuscular Q4H PRN Max 4/day HOLLI Lion      And    LORazepam  2 mg Intramuscular Q4H PRN Max 4/day HOLLI Lion      And    benztropine  1 mg Intramuscular Q4H PRN Max 4/day HOLLI Lion      benztropine  1 mg Oral Q4H PRN Max 6/day HOLLI Lion      benztropine  1 mg Oral Q4H PRN Max 6/day Jordan C Holter, DO      bisacodyl  10 mg Rectal Daily PRN HOLLI Lion      cloZAPine  450 mg Oral HS Bora Rosario MD      cyanocobalamin  1,000 mcg Oral Daily HOLLI Galvan      hydrOXYzine HCL  50 mg Oral Q6H PRN Max 4/day Jordan C Holter, DO      Or    diphenhydrAMINE  50 mg Intramuscular Q6H PRN Jordan C Holter, DO      hydrOXYzine HCL  50 mg Oral Q6H PRN Max 4/day HOLLI Lion      Or    diphenhydrAMINE  50 mg Intramuscular Q6H PRN HOLLI Lion      diphenhydrAMINE-zinc acetate   Topical BID PRN HOLLI Lion      ergocalciferol  50,000 Units Oral Weekly HOLLI Galvan      escitalopram  20 mg Oral Daily HOLLI Lion      haloperidol  1 mg Oral Q6H PRN HOLLI Lion      haloperidol  2.5 mg Oral Q4H PRN Max 4/day HOLLI Lion      haloperidol  5 mg Oral Q4H PRN Max 4/day HOLLI Lion      hydrocortisone   Topical 4x Daily PRN Eveline Hunt,  HOLLI      hydrOXYzine HCL  100 mg Oral Q6H PRN Max 4/day [facility] Holter, DO      Or    LORazepam  2 mg Intramuscular Q6H PRN [facility] Holter, DO      hydrOXYzine HCL  100 mg Oral Q6H PRN Max 4/day HOLLI Lion      Or    LORazepam  2 mg Intramuscular Q6H PRN HOLLI Lion      hydrOXYzine HCL  25 mg Oral Q6H PRN Max 4/day [facility] Holter, DO      ibuprofen  600 mg Oral Q8H PRN HOLLI Lion      melatonin  3 mg Oral HS [facility] Holter, DO      metFORMIN  500 mg Oral Daily With Breakfast HOLLI Lion      methocarbamol  500 mg Oral Q6H PRN HOLLI Lion      nicotine polacrilex  2 mg Oral Q2H PRN [facility] Holter, DO      OLANZapine  5 mg Oral Q4H PRN Max 3/day [facility] Holter, DO      Or    OLANZapine  2.5 mg Intramuscular Q4H PRN Max 3/day [facility] Holter, DO      OLANZapine  5 mg Oral Q3H PRN Max 3/day [facility] Holter, DO      Or    OLANZapine  5 mg Intramuscular Q3H PRN Max 3/day [facility] Holter, DO      OLANZapine  2.5 mg Oral Q4H PRN Max 6/day [facility] Holter, DO      ondansetron  4 mg Oral Q6H PRN HOLLI Lion      polyethylene glycol  17 g Oral Daily HOLLI Lion      polyethylene glycol  17 g Oral Daily PRN [facility] Holter, DO      propranolol  10 mg Oral Q12H KAYLIE HOLLI Lion      risperiDONE  2 mg Oral HS Bora Rosario MD      senna-docusate sodium  1 tablet Oral Daily PRN [facility] Holter, DO      senna-docusate sodium  2 tablet Oral BID HOLLI Lion      traZODone  50 mg Oral HS PRN HOLLI Lion      white petrolatum-mineral oil   Topical TID PRN HOLLI Lion           Risks / Benefits of Treatment:    Risks, benefits, and possible side effects of medications explained to patient and patient verbalizes understanding and agreement for treatment.    Counseling / Coordination of Care:    Patient's progress discussed with staff in treatment team meeting.  Medications, treatment progress and treatment plan reviewed with patient.    Note  Share    This note was not shared with the patient due to reasonable likelihood of causing patient harm    HOLLI Ghotra 04/28/24

## 2024-04-28 NOTE — PLAN OF CARE
Problem: Alteration in Thoughts and Perception  Goal: Treatment Goal: Gain control of psychotic behaviors/thinking, reduce/eliminate presenting symptoms and demonstrate improved reality functioning upon discharge  Outcome: Progressing  Goal: Verbalize thoughts and feelings  Description: Interventions:  - Promote a nonjudgmental and trusting relationship with the patient through active listening and therapeutic communication  - Assess patient's level of functioning, behavior and potential for risk  - Engage patient in 1 on 1 interactions  - Encourage patient to express fears, feelings, frustrations, and discuss symptoms    - [place] patient to reality, help patient recognize reality-based thinking   - Administer medications as ordered and assess for potential side effects  - Provide the patient education related to the signs and symptoms of the illness and desired effects of prescribed medications  Outcome: Progressing  Goal: Refrain from acting on delusional thinking/internal stimuli  Description: Interventions:  - Monitor patient closely, per order   - Utilize least restrictive measures   - Set reasonable limits, give positive feedback for acceptable   - Administer medications as ordered and monitor of potential side effects  Outcome: Progressing  Goal: Agree to be compliant with medication regime, as prescribed and report medication side effects  Description: Interventions:  - Offer appropriate PRN medication and supervise ingestion; conduct AIMS, as needed   Outcome: Progressing  Goal: Attend and participate in unit activities, including therapeutic, recreational, and educational groups  Description: Interventions:  -Encourage Visitation and family involvement in care  Outcome: Progressing  Goal: Recognize dysfunctional thoughts, communicate reality-based thoughts at the time of discharge  Description: Interventions:  - Provide medication and psycho-education to assist patient in compliance and developing  insight into his/her illness   Outcome: Progressing  Goal: Complete daily ADLs, including personal hygiene independently, as able  Description: Interventions:  - Observe, teach, and assist patient with ADLS  - Monitor and promote a balance of rest/activity, with adequate nutrition and elimination   Outcome: Progressing     Problem: Ineffective Coping  Goal: Identifies ineffective coping skills  Outcome: Progressing  Goal: Identifies healthy coping skills  Outcome: Progressing  Goal: Demonstrates healthy coping skills  Outcome: Progressing  Goal: Participates in unit activities  Description: Interventions:  - Provide therapeutic environment   - Provide required programming   - Redirect inappropriate behaviors   Outcome: Progressing  Goal: Patient/Family participate in treatment and DC plans  Description: Interventions:  - Provide therapeutic environment  Outcome: Progressing  Goal: Patient/Family verbalizes awareness of resources  Outcome: Progressing     Problem: Depression  Goal: Treatment Goal: Demonstrate behavioral control of depressive symptoms, verbalize feelings of improved mood/affect, and adopt new coping skills prior to discharge  Outcome: Progressing  Goal: Verbalize thoughts and feelings  Description: Interventions:  - Assess and re-assess patient's level of risk   - Engage patient in 1:1 interactions, daily, for a minimum of 15 minutes   - Encourage patient to express feelings, fears, frustrations, hopes   Outcome: Progressing  Goal: Refrain from harming self  Description: Interventions:  - Monitor patient closely, per order   - Supervise medication ingestion, monitor effects and side effects   Outcome: Progressing  Goal: Refrain from isolation  Description: Interventions:  - Develop a trusting relationship   - Encourage socialization   Outcome: Progressing  Goal: Refrain from self-neglect  Outcome: Progressing  Goal: Attend and participate in unit activities, including therapeutic, recreational, and  educational groups  Description: Interventions:  - Provide therapeutic and educational activities daily, encourage attendance and participation, and document same in the medical record   Outcome: Progressing  Goal: Complete daily ADLs, including personal hygiene independently, as able  Description: Interventions:  - Observe, teach, and assist patient with ADLS  -  Monitor and promote a balance of rest/activity, with adequate nutrition and elimination   Outcome: Progressing     Problem: Anxiety  Goal: Anxiety is at manageable level  Description: Interventions:  - Assess and monitor patient's anxiety level.   - Monitor for signs and symptoms (heart palpitations, chest pain, shortness of breath, headaches, nausea, feeling jumpy, restlessness, irritable, apprehensive).   - Collaborate with interdisciplinary team and initiate plan and interventions as ordered.  - [place] patient to unit/surroundings  - Explain treatment plan  - Encourage participation in care  - Encourage verbalization of concerns/fears  - Identify coping mechanisms  - Assist in developing anxiety-reducing skills  - Administer/offer alternative therapies  - Limit or eliminate stimulants  Outcome: Progressing     Problem: Alteration in Orientation  Goal: Treatment Goal: Demonstrate a reduction of confusion and improved orientation to person, place, time and/or situation upon discharge, according to optimum baseline/ability  Outcome: Progressing  Goal: Interact with staff daily  Description: Interventions:  - Assess and re-assess patient's level of orientation  - Engage patient in 1 on 1 interactions, daily, for a minimum of 15 minutes   - Establish rapport/trust with patient   Outcome: Progressing  Goal: Express concerns related to confused thinking related to:  Description: Interventions:  - Encourage patient to express feelings, fears, frustrations, hopes  - Assign consistent caregivers   - [place]/re-orient patient as needed  - Allow comfort items, as  appropriate  - Provide visual cues, signs, etc.   Outcome: Progressing  Goal: Allow medical examinations, as recommended  Description: Interventions:  - Provide physical/neurological exams and/or referrals, per provider   Outcome: Progressing  Goal: Cooperate with recommended testing/procedures  Description: Interventions:  - Determine need for ancillary testing  - Observe for mental status changes  - Implement falls/precaution protocol   Outcome: Progressing  Goal: Attend and participate in unit activities, including therapeutic, recreational, and educational groups  Description: Interventions:  - Provide therapeutic and educational activities daily, encourage attendance and participation, and document same in the medical record   - Provide appropriate opportunities for reminiscence   - Provide a consistent daily routine   - Encourage family contact/visitation   Outcome: Progressing  Goal: Complete daily ADLs, including personal hygiene independently, as able  Description: Interventions:  - Observe, teach, and assist patient with ADLS  Outcome: Progressing     Problem: Individualized Interventions  Goal: Patient will verbalize appropriate use of telephone within 5 days  Description: Interventions:  - Treatment team to determine use of supervised phone privileges   Outcome: Progressing  Goal: Patient will verbalize need for hospitalization and will no longer attempt elopement within 5 days  Description: Interventions:  - Ongoing education to help patient understand need for hospitalization  Outcome: Progressing  Goal: Patient will recognize inappropriate behaviors and develop alternative behaviors within 5 days  Description: Interventions:  - Patient in collaboration with Treatment Team will develop a behavior management plan to help identify effective coping skills to deal with stressors  Outcome: Progressing     Problem: Electroconvulsive therapy (ECT)  Goal: Treatment Goal: Demonstrate a reduction of confusion  and improved orientation to person, place, time and/or situation upon discharge, according to optimum baseline/ability  Outcome: Progressing  Goal: Verbalizes/displays adequate comfort level or baseline comfort level  Description: Interventions:  - Encourage patient to monitor pain and request assistance  - Assess pain using appropriate pain scale  - Administer analgesics based on type and severity of pain and evaluate response  - Implement non-pharmacological measures as appropriate and evaluate response  - Consider cultural and social influences on pain and pain management  - Notify physician/advanced practitioner if interventions unsuccessful or patient reports new pain  Outcome: Progressing  Goal: Achieves stable or improved neurological status  Description: INTERVENTIONS  - Monitor and report changes in neurological status  - Monitor vital signs such as temperature, blood pressure, glucose, and any other labs ordered   - Initiate measures to prevent increased intracranial pressure  - Monitor for seizure activity and implement precautions if appropriate      Outcome: Progressing  Goal: Achieves maximal functionality and self care  Description: INTERVENTIONS  - Monitor swallowing and airway patency with patient fatigue and changes in neurological status  - Encourage and assist patient to increase activity and self care.   - Encourage visually impaired, hearing impaired and aphasic patients to use assistive/communication devices  Outcome: Progressing  Goal: Maintain or return mobility to safest level of function  Description: INTERVENTIONS:  - Assess patient's ability to carry out ADLs; assess patient's baseline for ADL function and identify physical deficits which impact ability to perform ADLs (bathing, care of mouth/teeth, toileting, grooming, dressing, etc.)  - Assess/evaluate cause of self-care deficits   - Assess range of motion  - Assess patient's mobility  - Assess patient's need for assistive devices and  provide as appropriate  - Encourage maximum independence but intervene and supervise when necessary  - Involve family in performance of ADLs  - Assess for home care needs following discharge   - Consider OT consult to assist with ADL evaluation and planning for discharge  - Provide patient education as appropriate  Outcome: Progressing  Goal: Absence of urinary retention  Description: INTERVENTIONS:  - Assess patient’s ability to void and empty bladder  - Monitor I/O  - Bladder scan as needed  - Discuss with physician/AP medications to alleviate retention as needed  - Discuss catheterization for long term situations as appropriate  Outcome: Progressing  Goal: Minimal or absence of nausea and/or vomiting  Description: INTERVENTIONS:  - Administer IV fluids if ordered to ensure adequate hydration  - Maintain NPO status until nausea and vomiting are resolved  - Nasogastric tube if ordered  - Administer ordered antiemetic medications as needed  - Provide nonpharmacologic comfort measures as appropriate  - Advance diet as tolerated, if ordered  - Consider nutrition services referral to assist patient with adequate nutrition and appropriate food choices  Outcome: Progressing  Goal: Maintains adequate nutritional intake  Description: INTERVENTIONS:  - Monitor percentage of each meal consumed  - Identify factors contributing to decreased intake, treat as appropriate  - Assist with meals as needed  - Monitor I&O, weight, and lab values if indicated  - Obtain nutrition services referral as needed  Outcome: Progressing     Problem: DISCHARGE PLANNING - CARE MANAGEMENT  Goal: Discharge to post-acute care or home with appropriate resources  Description: INTERVENTIONS:  - Conduct assessment to determine patient/family and health care team treatment goals, and need for post-acute services based on payer coverage, community resources, and patient preferences, and barriers to discharge  - Address psychosocial, clinical, and  financial barriers to discharge as identified in assessment in conjunction with the patient/family and health care team  - Arrange appropriate level of post-acute services according to patient’s   needs and preference and payer coverage in collaboration with the physician and health care team  - Communicate with and update the patient/family, physician, and health care team regarding progress on the discharge plan  - Arrange appropriate transportation to post-acute venues  Outcome: Progressing

## 2024-04-28 NOTE — NURSING NOTE
Weekly wellness assessment completed. Pt lung sounds clear in all lung fields. Trace edema noted to b/l lower ext. Bowel sounds +x4. B/l pedal pulses papable. Skin intact, b/l feet noted dry and pt is encouraged to utilize lotion. Pt states he will use his lotion. Skin warm and color within normal limits for patient. Pt refused shower this am.

## 2024-04-28 NOTE — NURSING NOTE
Pt slept in this am, and up for meals. Pt is currently awake alert, cooperative and visible intermittently. No SI or HI noted. Denies, anxiety and pain. Pt states he has a little depression. Did not attend any groups. Pt had virtual visit with sister that went well. Consumed 100% of breakfast and 100% of lunch. Took all medication without prompting. Maintained on safe precautions without incident. Will continue to monitor progress and recovery program.

## 2024-04-28 NOTE — NURSING NOTE
Patient Pleasant upon approach.  Medication compliant as well. Behaviors controlled and appropriate. Offered no complaints. No prn medication requested or required.

## 2024-04-29 LAB
BNP SERPL-MCNC: 7 PG/ML (ref 0–100)
CARDIAC TROPONIN I PNL SERPL HS: <2 NG/L (ref 8–18)
CK SERPL-CCNC: 223 U/L (ref 39–308)
CLOZAPINE SERPL-MCNC: 520 NG/ML (ref 350–900)
CRP SERPL QL: 10.1 MG/L

## 2024-04-29 PROCEDURE — 83880 ASSAY OF NATRIURETIC PEPTIDE: CPT | Performed by: PSYCHIATRY & NEUROLOGY

## 2024-04-29 PROCEDURE — 82550 ASSAY OF CK (CPK): CPT | Performed by: PSYCHIATRY & NEUROLOGY

## 2024-04-29 PROCEDURE — 99232 SBSQ HOSP IP/OBS MODERATE 35: CPT | Performed by: PSYCHIATRY & NEUROLOGY

## 2024-04-29 PROCEDURE — 86140 C-REACTIVE PROTEIN: CPT | Performed by: PSYCHIATRY & NEUROLOGY

## 2024-04-29 PROCEDURE — 84484 ASSAY OF TROPONIN QUANT: CPT | Performed by: PSYCHIATRY & NEUROLOGY

## 2024-04-29 PROCEDURE — 80159 DRUG ASSAY CLOZAPINE: CPT | Performed by: PSYCHIATRY & NEUROLOGY

## 2024-04-29 RX ADMIN — PROPRANOLOL HYDROCHLORIDE 10 MG: 10 TABLET ORAL at 21:25

## 2024-04-29 RX ADMIN — NICOTINE POLACRILEX 2 MG: 2 GUM, CHEWING BUCCAL at 17:55

## 2024-04-29 RX ADMIN — ESCITALOPRAM OXALATE 20 MG: 10 TABLET, FILM COATED ORAL at 09:11

## 2024-04-29 RX ADMIN — SENNOSIDES AND DOCUSATE SODIUM 2 TABLET: 8.6; 5 TABLET ORAL at 09:11

## 2024-04-29 RX ADMIN — NICOTINE POLACRILEX 2 MG: 2 GUM, CHEWING BUCCAL at 09:11

## 2024-04-29 RX ADMIN — NICOTINE POLACRILEX 2 MG: 2 GUM, CHEWING BUCCAL at 21:37

## 2024-04-29 RX ADMIN — AMLODIPINE BESYLATE 5 MG: 5 TABLET ORAL at 09:11

## 2024-04-29 RX ADMIN — METFORMIN HYDROCHLORIDE 500 MG: 500 TABLET ORAL at 09:11

## 2024-04-29 RX ADMIN — PROPRANOLOL HYDROCHLORIDE 10 MG: 10 TABLET ORAL at 09:11

## 2024-04-29 RX ADMIN — MELATONIN TAB 3 MG 3 MG: 3 TAB at 21:25

## 2024-04-29 RX ADMIN — NICOTINE POLACRILEX 2 MG: 2 GUM, CHEWING BUCCAL at 15:43

## 2024-04-29 RX ADMIN — CYANOCOBALAMIN TAB 1000 MCG 1000 MCG: 1000 TAB at 09:11

## 2024-04-29 RX ADMIN — SENNOSIDES AND DOCUSATE SODIUM 2 TABLET: 8.6; 5 TABLET ORAL at 17:14

## 2024-04-29 RX ADMIN — NICOTINE POLACRILEX 2 MG: 2 GUM, CHEWING BUCCAL at 12:31

## 2024-04-29 RX ADMIN — POLYETHYLENE GLYCOL 3350 17 G: 17 POWDER, FOR SOLUTION ORAL at 09:11

## 2024-04-29 RX ADMIN — RISPERIDONE 2 MG: 2 TABLET, FILM COATED ORAL at 21:26

## 2024-04-29 RX ADMIN — CLOZAPINE 450 MG: 25 TABLET ORAL at 21:25

## 2024-04-29 NOTE — PROGRESS NOTES
"Psychiatry Progress Note [facility]    Alberto Berumen 28 y.o. male MRN: [id number]  Unit/Bed#: [facility] 101-02 Encounter: [id number]  Code Status: Level 1 - Full Code    PCP: Joce Juan MD    Date of Admission:  3/29/2024 2008   Date of Service:  04/29/24    Patient Active Problem List   Diagnosis    GERD (gastroesophageal reflux disease)    Medical clearance for psychiatric admission    Schizoaffective disorder, bipolar type (HCC)    Tobacco abuse    T wave inversion in EKG    Syringoma    Chronic idiopathic constipation    Vitamin B 12 deficiency    Vitamin D deficiency    Confluent and reticulate papillomatosis    Class 2 obesity in adult    Primary hypertension    Elevated hemoglobin A1c     Review of systems: Unremarkable, missed breakfast this m as he did not want to get OOB when called   Diagnosis: Schizoaffective bipolar    Assessment  Overall Status: Claims to still hear voices that are threatening to kill him and his family and stays to himself most of the time but he believes ECT has helped so that he does not \"live much in my head\" and claims voices are not that bad .  Not aggressive or agitated attending some groups staying to himself  Certification Statement: The patient will continue to require additional inpatient hospital stay due to ongoing voices that are threatening to kill him and his family lack of response to medications and ECT so far.     Medications: Clozapine 450 mg at bedtime, propranolol 10 mg every 12 hours as as needed for anxiety, Risperdal 2 mg at bedtime Lexapro 20 mg once a day, atropine eyedrops sublingual for drooling of saliva and senna 2 tablets twice a day for constipation  All medications reviewed and I recommend they be continued for symptom management   Side effects from treatment: None reported  Medication changes   None today   Medication education   Risks side effects benefits and precautions of medications discussed with patient and he did " verbalize an understanding about risks for metabolic syndrome from being on neuroleptics and is form tardive dyskinesia etc. and special precautions about being on clozapine   Understanding of medications: Has some understanding   Justification for dual anti-psychotics: Not applicable    Non-pharmacological treatments  Continue with individual, group, milieu and occupational therapy using recovery principles and psycho-education about accepting illness and the need for treatment.   to contact aunt and explore ACT team referral which he never had  ECT to be continued  weekly for maintenance starting on 4/12/2024  Refuses to see a dietician and agrees to do more exercises as h eis about 100 lbs overweight     Safety  Safety and communication plan established to target dynamic risk factors discussed above.    Discharge Plan   Back to his aunt with an ACT team    Interval Progress   Patient had 1 maintenance ECT last Friday which she claims did help so that he does not feel like living inside his head.  He has not been aggressive or agitated or threatening or self-abusive and remains clean shaven with scalp hair and dreadlocks.  He has been in touch with his sister and aunt and did have a video call with sister last weekend.  Remains polite and friendly pleasant when approached and is brighter in interaction and claims he has been hearing voices for more than 2 years which has not gotten much better. He reported that voices were loud yesterday    Acceptance by patient: Accepting  Hopefulness in recovery: Living with his aunt again  Involved in reintegration process: Talking with his aunt and sister  Trusting in relationship with psychiatrist: Trusting  Sleep: Good  Appetite: Good  Compliance with Medications: Compliant  Group attendance: Attending only a few 4/9  Significant events: Still with same voices threatening to kill him and his family but not aggressive and not acting upon the voices and still  isolated withdrawn    Mental Status Exam  Appearance: age appropriate, improved grooming, looks older than stated age, overweight, with hair in dreadlocks casually dressed with a clean shaven face, fairly groomed with good eye contact  attended team meeting and wearing hospital clothing  Behavior: cooperative, mildly anxious, evasive, gesturing, slow responses.  Appearing preoccupied  Speech: normal rate, normal volume, normal pitch  Mood: dysphoric, anxious, reports he feels good  again appearing anxious and preoccupied   Affect: constricted, inappropriate, mood-congruent  Thought Process: organized, logical, coherent, goal directed, linear, decreased rate of thoughts, slowing of thoughts, negative thinking, impaired abstract reasoning, concrete  Thought Content: paranoid ideation, some paranoia, grandiose ideas, intrusive thoughts, preoccupied, chronic, continues to report paranoia about people threatening to kill him and his family because of the voices.  He believes ECT has helped so that he is not much in his head.  No other delusions elicited.  No current suicidal or homicidal thoughts and no plans verbalized.  No phobias obsessions compulsions or distorted body perceptions reported.  Preoccupied with wanting to get ECTs more often despite reminding him that it may impair his memory  Perceptual Disturbances: still with same threatening voices to kill him and his family , not commanding , no other hallucinations reported   Hx Risk Factors: chronic psychiatric problems, chronic anxiety symptoms, chronic psychotic symptoms, his aunt possibly moving away  Sensorium: oriented x 3 spheres and situation   Cognition: recent and remote memory grossly intact  Consciousness: alert and awake  Attention: attention span and concentration are age appropriate  Intellect: appears to be of average intelligence  Insight: intact  Judgement: intact  Motor Activity: no abnormal movements     Vitals  Temp:  [97.8 °F (36.6 °C)-98.4  °F (36.9 °C)] 97.8 °F (36.6 °C)  HR:  [] 86  Resp:  [18] 18  BP: (123-161)/(67-98) 123/67  SpO2:  [99 %-100 %] 100 %  No intake or output data in the 24 hours ending 04/29/24 0752            Lab Results: All Labs For Current Hospital Admission Reviewed     Current Facility-Administered Medications   Medication Dose Route Frequency Provider Last Rate    acetaminophen  650 mg Oral Q4H PRN Jordan C Holter, DO      acetaminophen  650 mg Oral Q6H PRN STEVE LionNP      aluminum-magnesium hydroxide-simethicone  30 mL Oral Q4H PRN Jordan C Holter, DO      amLODIPine  5 mg Oral Daily HOLLI Lion      Artificial Tears  1 drop Both Eyes Q3H PRN Jordan C Holter, DO      atropine  1 drop Sublingual HS HOLLI Lion      atropine  1 drop Sublingual Daily PRN STEVE LionNP      haloperidol lactate  2.5 mg Intramuscular Q4H PRN Max 4/day STEVE LionNP      And    LORazepam  1 mg Intramuscular Q4H PRN Max 4/day STEVE LionNP      And    benztropine  0.5 mg Intramuscular Q4H PRN Max 4/day HOLLI Lion      benztropine  1 mg Intramuscular Q4H PRN Max 6/day Jordan C Holter, DO      haloperidol lactate  5 mg Intramuscular Q4H PRN Max 4/day STEVE LionNP      And    LORazepam  2 mg Intramuscular Q4H PRN Max 4/day HOLLI Lion      And    benztropine  1 mg Intramuscular Q4H PRN Max 4/day HOLLI Lion      benztropine  1 mg Oral Q4H PRN Max 6/day STEVE LionNP      benztropine  1 mg Oral Q4H PRN Max 6/day Jordan C Holter, DO      bisacodyl  10 mg Rectal Daily PRN HOLLI Lion      cloZAPine  450 mg Oral HS Bora Rosario MD      cyanocobalamin  1,000 mcg Oral Daily HOLLI Galvan      hydrOXYzine HCL  50 mg Oral Q6H PRN Max 4/day Jordan C Holter,       Or    diphenhydrAMINE  50 mg Intramuscular Q6H PRN Jordan C Holter, DO      hydrOXYzine HCL  50 mg Oral Q6H PRN Max 4/day HOLLI Lion      Or    diphenhydrAMINE  50 mg Intramuscular Q6H PRN Eveline  HOLLI Hunt      diphenhydrAMINE-zinc acetate   Topical BID PRN HOLLI Lion      ergocalciferol  50,000 Units Oral Weekly HOLLI Galvan      escitalopram  20 mg Oral Daily HOLLI Lion      haloperidol  1 mg Oral Q6H PRN HOLLI Lion      haloperidol  2.5 mg Oral Q4H PRN Max 4/day HOLLI Lion      haloperidol  5 mg Oral Q4H PRN Max 4/day HOLLI Lion      hydrocortisone   Topical 4x Daily PRN HOLLI Lion      hydrOXYzine HCL  100 mg Oral Q6H PRN Max 4/day [facility] Holter, DO      Or    LORazepam  2 mg Intramuscular Q6H PRN [facility] Holter, DO      hydrOXYzine HCL  100 mg Oral Q6H PRN Max 4/day HOLLI Lion      Or    LORazepam  2 mg Intramuscular Q6H PRN HOLLI Lion      hydrOXYzine HCL  25 mg Oral Q6H PRN Max 4/day [facility] Holter, DO      ibuprofen  600 mg Oral Q8H PRN HOLLI Lion      melatonin  3 mg Oral HS [facility] Holter, DO      metFORMIN  500 mg Oral Daily With Breakfast HOLLI Lion      methocarbamol  500 mg Oral Q6H PRN HOLLI Lion      nicotine polacrilex  2 mg Oral Q2H PRN [facility] Holter, DO      OLANZapine  5 mg Oral Q4H PRN Max 3/day [facility] Holter, DO      Or    OLANZapine  2.5 mg Intramuscular Q4H PRN Max 3/day [facility] Holter, DO      OLANZapine  5 mg Oral Q3H PRN Max 3/day [facility] Holter, DO      Or    OLANZapine  5 mg Intramuscular Q3H PRN Max 3/day [facility] Holter, DO      OLANZapine  2.5 mg Oral Q4H PRN Max 6/day [facility] Holter, DO      ondansetron  4 mg Oral Q6H PRN HOLLI Lion      polyethylene glycol  17 g Oral Daily HOLLI Lion      polyethylene glycol  17 g Oral Daily PRN [facility] Holter, DO      propranolol  10 mg Oral Q12H KAYLIE HOLLI Lion      risperiDONE  2 mg Oral HS Bora Rosario MD      senna-docusate sodium  1 tablet Oral Daily PRN [facility] Holter, DO      senna-docusate sodium  2 tablet Oral BID HOLLI Lion      traZODone  50 mg Oral HS PRN HOLLI Lion       white petrolatum-mineral oil   Topical TID PRN HOLLI Lion         Counseling / Coordination of Care: Total floor / unit time spent today 15 minutes. Greater than 50% of total time was spent with the patient and / or family counseling and / or somewhat receptive to supportive listening and teaching positive coping skills to deal with symptom mangement.     Patient's Rights, confidentiality and exceptions to confidentiality, use of automated medical record, [facility] staff access to medical record, and consent to treatment reviewed.    This note has been dictated and hence there may be problems with punctuation, spelling and formatting and if anyone has any concerns please address them to Dr. Rosario   This note is not shared with patient due to potential for making patient's condition worse by knowing the content of the note.

## 2024-04-29 NOTE — PROGRESS NOTES
04/29/24 1035   Team Meeting   Meeting Type Tx Team Meeting   Initial Conference Date 04/29/24   Next Conference Date 05/29/24   Team Members Present   Team Members Present Physician;Nurse;   Physician Team Member Abraham   Nursing Team Member Claudia   Social Work Team Member Jenny ORTEGA   Patient/Family Present   Patient Present Yes   Patient's Family Present No     Pt attended review of his treatment plan. Pt was engaged and reported no questions. Pt declined receiving a copy; copy of signed plan was placed in pt's chart.

## 2024-04-29 NOTE — NURSING NOTE
Patient visible this evening in the dining room with peers watching television. Patient cooperative with staff and pleasant upon approach. Medication compliant as well. Behaviors controlled and appropriate. No prn medication requested or required.

## 2024-04-29 NOTE — PLAN OF CARE
Problem: Alteration in Thoughts and Perception  Goal: Treatment Goal: Gain control of psychotic behaviors/thinking, reduce/eliminate presenting symptoms and demonstrate improved reality functioning upon discharge  Outcome: Progressing  Goal: Verbalize thoughts and feelings  Description: Interventions:  - Promote a nonjudgmental and trusting relationship with the patient through active listening and therapeutic communication  - Assess patient's level of functioning, behavior and potential for risk  - Engage patient in 1 on 1 interactions  - Encourage patient to express fears, feelings, frustrations, and discuss symptoms    - [place] patient to reality, help patient recognize reality-based thinking   - Administer medications as ordered and assess for potential side effects  - Provide the patient education related to the signs and symptoms of the illness and desired effects of prescribed medications  Outcome: Progressing  Goal: Refrain from acting on delusional thinking/internal stimuli  Description: Interventions:  - Monitor patient closely, per order   - Utilize least restrictive measures   - Set reasonable limits, give positive feedback for acceptable   - Administer medications as ordered and monitor of potential side effects  Outcome: Progressing  Goal: Agree to be compliant with medication regime, as prescribed and report medication side effects  Description: Interventions:  - Offer appropriate PRN medication and supervise ingestion; conduct AIMS, as needed   Outcome: Progressing  Goal: Attend and participate in unit activities, including therapeutic, recreational, and educational groups  Description: Interventions:  -Encourage Visitation and family involvement in care  Outcome: Progressing  Goal: Recognize dysfunctional thoughts, communicate reality-based thoughts at the time of discharge  Description: Interventions:  - Provide medication and psycho-education to assist patient in compliance and developing  insight into his/her illness   Outcome: Progressing  Goal: Complete daily ADLs, including personal hygiene independently, as able  Description: Interventions:  - Observe, teach, and assist patient with ADLS  - Monitor and promote a balance of rest/activity, with adequate nutrition and elimination   Outcome: Progressing     Problem: Ineffective Coping  Goal: Identifies ineffective coping skills  Outcome: Progressing  Goal: Identifies healthy coping skills  Outcome: Progressing  Goal: Demonstrates healthy coping skills  Outcome: Progressing  Goal: Participates in unit activities  Description: Interventions:  - Provide therapeutic environment   - Provide required programming   - Redirect inappropriate behaviors   Outcome: Progressing  Goal: Patient/Family participate in treatment and DC plans  Description: Interventions:  - Provide therapeutic environment  Outcome: Progressing  Goal: Patient/Family verbalizes awareness of resources  Outcome: Progressing     Problem: Depression  Goal: Treatment Goal: Demonstrate behavioral control of depressive symptoms, verbalize feelings of improved mood/affect, and adopt new coping skills prior to discharge  Outcome: Progressing  Goal: Verbalize thoughts and feelings  Description: Interventions:  - Assess and re-assess patient's level of risk   - Engage patient in 1:1 interactions, daily, for a minimum of 15 minutes   - Encourage patient to express feelings, fears, frustrations, hopes   Outcome: Progressing  Goal: Refrain from harming self  Description: Interventions:  - Monitor patient closely, per order   - Supervise medication ingestion, monitor effects and side effects   Outcome: Progressing  Goal: Refrain from isolation  Description: Interventions:  - Develop a trusting relationship   - Encourage socialization   Outcome: Progressing  Goal: Refrain from self-neglect  Outcome: Progressing  Goal: Attend and participate in unit activities, including therapeutic, recreational, and  educational groups  Description: Interventions:  - Provide therapeutic and educational activities daily, encourage attendance and participation, and document same in the medical record   Outcome: Progressing  Goal: Complete daily ADLs, including personal hygiene independently, as able  Description: Interventions:  - Observe, teach, and assist patient with ADLS  -  Monitor and promote a balance of rest/activity, with adequate nutrition and elimination   Outcome: Progressing     Problem: Anxiety  Goal: Anxiety is at manageable level  Description: Interventions:  - Assess and monitor patient's anxiety level.   - Monitor for signs and symptoms (heart palpitations, chest pain, shortness of breath, headaches, nausea, feeling jumpy, restlessness, irritable, apprehensive).   - Collaborate with interdisciplinary team and initiate plan and interventions as ordered.  - [place] patient to unit/surroundings  - Explain treatment plan  - Encourage participation in care  - Encourage verbalization of concerns/fears  - Identify coping mechanisms  - Assist in developing anxiety-reducing skills  - Administer/offer alternative therapies  - Limit or eliminate stimulants  Outcome: Progressing     Problem: Alteration in Orientation  Goal: Treatment Goal: Demonstrate a reduction of confusion and improved orientation to person, place, time and/or situation upon discharge, according to optimum baseline/ability  Outcome: Progressing  Goal: Interact with staff daily  Description: Interventions:  - Assess and re-assess patient's level of orientation  - Engage patient in 1 on 1 interactions, daily, for a minimum of 15 minutes   - Establish rapport/trust with patient   Outcome: Progressing  Goal: Express concerns related to confused thinking related to:  Description: Interventions:  - Encourage patient to express feelings, fears, frustrations, hopes  - Assign consistent caregivers   - [place]/re-orient patient as needed  - Allow comfort items, as  appropriate  - Provide visual cues, signs, etc.   Outcome: Progressing  Goal: Allow medical examinations, as recommended  Description: Interventions:  - Provide physical/neurological exams and/or referrals, per provider   Outcome: Progressing  Goal: Cooperate with recommended testing/procedures  Description: Interventions:  - Determine need for ancillary testing  - Observe for mental status changes  - Implement falls/precaution protocol   Outcome: Progressing  Goal: Attend and participate in unit activities, including therapeutic, recreational, and educational groups  Description: Interventions:  - Provide therapeutic and educational activities daily, encourage attendance and participation, and document same in the medical record   - Provide appropriate opportunities for reminiscence   - Provide a consistent daily routine   - Encourage family contact/visitation   Outcome: Progressing  Goal: Complete daily ADLs, including personal hygiene independently, as able  Description: Interventions:  - Observe, teach, and assist patient with ADLS  Outcome: Progressing     Problem: Individualized Interventions  Goal: Patient will verbalize appropriate use of telephone within 5 days  Description: Interventions:  - Treatment team to determine use of supervised phone privileges   Outcome: Progressing  Goal: Patient will verbalize need for hospitalization and will no longer attempt elopement within 5 days  Description: Interventions:  - Ongoing education to help patient understand need for hospitalization  Outcome: Progressing  Goal: Patient will recognize inappropriate behaviors and develop alternative behaviors within 5 days  Description: Interventions:  - Patient in collaboration with Treatment Team will develop a behavior management plan to help identify effective coping skills to deal with stressors  Outcome: Progressing     Problem: Electroconvulsive therapy (ECT)  Goal: Treatment Goal: Demonstrate a reduction of confusion  and improved orientation to person, place, time and/or situation upon discharge, according to optimum baseline/ability  Outcome: Progressing  Goal: Verbalizes/displays adequate comfort level or baseline comfort level  Description: Interventions:  - Encourage patient to monitor pain and request assistance  - Assess pain using appropriate pain scale  - Administer analgesics based on type and severity of pain and evaluate response  - Implement non-pharmacological measures as appropriate and evaluate response  - Consider cultural and social influences on pain and pain management  - Notify physician/advanced practitioner if interventions unsuccessful or patient reports new pain  Outcome: Progressing  Goal: Achieves stable or improved neurological status  Description: INTERVENTIONS  - Monitor and report changes in neurological status  - Monitor vital signs such as temperature, blood pressure, glucose, and any other labs ordered   - Initiate measures to prevent increased intracranial pressure  - Monitor for seizure activity and implement precautions if appropriate      Outcome: Progressing  Goal: Achieves maximal functionality and self care  Description: INTERVENTIONS  - Monitor swallowing and airway patency with patient fatigue and changes in neurological status  - Encourage and assist patient to increase activity and self care.   - Encourage visually impaired, hearing impaired and aphasic patients to use assistive/communication devices  Outcome: Progressing  Goal: Maintain or return mobility to safest level of function  Description: INTERVENTIONS:  - Assess patient's ability to carry out ADLs; assess patient's baseline for ADL function and identify physical deficits which impact ability to perform ADLs (bathing, care of mouth/teeth, toileting, grooming, dressing, etc.)  - Assess/evaluate cause of self-care deficits   - Assess range of motion  - Assess patient's mobility  - Assess patient's need for assistive devices and  provide as appropriate  - Encourage maximum independence but intervene and supervise when necessary  - Involve family in performance of ADLs  - Assess for home care needs following discharge   - Consider OT consult to assist with ADL evaluation and planning for discharge  - Provide patient education as appropriate  Outcome: Progressing  Goal: Absence of urinary retention  Description: INTERVENTIONS:  - Assess patient’s ability to void and empty bladder  - Monitor I/O  - Bladder scan as needed  - Discuss with physician/AP medications to alleviate retention as needed  - Discuss catheterization for long term situations as appropriate  Outcome: Progressing  Goal: Minimal or absence of nausea and/or vomiting  Description: INTERVENTIONS:  - Administer IV fluids if ordered to ensure adequate hydration  - Maintain NPO status until nausea and vomiting are resolved  - Nasogastric tube if ordered  - Administer ordered antiemetic medications as needed  - Provide nonpharmacologic comfort measures as appropriate  - Advance diet as tolerated, if ordered  - Consider nutrition services referral to assist patient with adequate nutrition and appropriate food choices  Outcome: Progressing  Goal: Maintains adequate nutritional intake  Description: INTERVENTIONS:  - Monitor percentage of each meal consumed  - Identify factors contributing to decreased intake, treat as appropriate  - Assist with meals as needed  - Monitor I&O, weight, and lab values if indicated  - Obtain nutrition services referral as needed  Outcome: Progressing     Problem: DISCHARGE PLANNING - CARE MANAGEMENT  Goal: Discharge to post-acute care or home with appropriate resources  Description: INTERVENTIONS:  - Conduct assessment to determine patient/family and health care team treatment goals, and need for post-acute services based on payer coverage, community resources, and patient preferences, and barriers to discharge  - Address psychosocial, clinical, and  financial barriers to discharge as identified in assessment in conjunction with the patient/family and health care team  - Arrange appropriate level of post-acute services according to patient’s   needs and preference and payer coverage in collaboration with the physician and health care team  - Communicate with and update the patient/family, physician, and health care team regarding progress on the discharge plan  - Arrange appropriate transportation to post-acute venues  Outcome: Progressing

## 2024-04-29 NOTE — PROGRESS NOTES
04/29/24 0726   Team Meeting   Meeting Type Daily Rounds   Team Members Present   Team Members Present Physician;Nurse;;Other (Discipline and Name)   Patient/Family Present   Patient Present No   Patient's Family Present No     In attendance:  Dr. Alex Thomas, MD Dr. Jordan Holter, DO Akira Kunz, JOSÉ MIGUEL Taylor, RN  Luisana Alvarado, [facility]  Carla Lux, W  Irais Mcdowell M.S.    Groups: 4/9    Pt had virtual visit with his sister that went well. Pt's next ECT is 5/3. Pt denies any depression or anxiety, reports voices are still present. Pt is often lethargic but pleasant.

## 2024-04-29 NOTE — NURSING NOTE
Patient is visible on unit, watching tv with peers. Pt quiet but able to make needs known. Pt takes all medications without issue. Pt reports no s/s, voices no concerns.

## 2024-04-29 NOTE — PLAN OF CARE
Problem: Ineffective Coping  Goal: Identifies ineffective coping skills  Outcome: Progressing  Goal: Identifies healthy coping skills  Outcome: Progressing  Goal: Demonstrates healthy coping skills  Outcome: Progressing  Goal: Participates in unit activities  Description: Interventions:  - Provide therapeutic environment   - Provide required programming   - Redirect inappropriate behaviors   Outcome: Progressing   Alberto attended 58% of the groups offered last week.

## 2024-04-29 NOTE — PROGRESS NOTES
04/29/24 1033   Team Meeting   Meeting Type Tx Team Meeting   Initial Conference Date 04/29/24   Next Conference Date 05/13/24   Team Members Present   Team Members Present Physician;Nurse;;Other (Discipline and Name)   Physician Team Member Abraham   Nursing Team Member Claudia   Social Work Team Member Jenny ORTEGA   Other (Discipline and Name) Geronimo GUTIERREZ   Patient/Family Present   Patient Present Yes   Patient's Family Present No   OTHER   Team Meeting - Additional Comments Pt attended tx team meeting. Pt read through his self-assessment. Pt identified he's been working on patience with himself and others. Pt enthusiastically engages in ECT treatments. No bx concerns. Pt was encouraged by psychiatrist to walk the unit and keep himself moving due to weight gain and to continue to attend groups.

## 2024-04-29 NOTE — PLAN OF CARE
Problem: Alteration in Thoughts and Perception  Goal: Treatment Goal: Gain control of psychotic behaviors/thinking, reduce/eliminate presenting symptoms and demonstrate improved reality functioning upon discharge  Outcome: Progressing  Goal: Verbalize thoughts and feelings  Description: Interventions:  - Promote a nonjudgmental and trusting relationship with the patient through active listening and therapeutic communication  - Assess patient's level of functioning, behavior and potential for risk  - Engage patient in 1 on 1 interactions  - Encourage patient to express fears, feelings, frustrations, and discuss symptoms    - [place] patient to reality, help patient recognize reality-based thinking   - Administer medications as ordered and assess for potential side effects  - Provide the patient education related to the signs and symptoms of the illness and desired effects of prescribed medications  Outcome: Not Progressing  Goal: Refrain from acting on delusional thinking/internal stimuli  Description: Interventions:  - Monitor patient closely, per order   - Utilize least restrictive measures   - Set reasonable limits, give positive feedback for acceptable   - Administer medications as ordered and monitor of potential side effects  Outcome: Progressing  Goal: Agree to be compliant with medication regime, as prescribed and report medication side effects  Description: Interventions:  - Offer appropriate PRN medication and supervise ingestion; conduct AIMS, as needed   Outcome: Progressing  Goal: Attend and participate in unit activities, including therapeutic, recreational, and educational groups  Description: Interventions:  -Encourage Visitation and family involvement in care  Outcome: Progressing  Goal: Recognize dysfunctional thoughts, communicate reality-based thoughts at the time of discharge  Description: Interventions:  - Provide medication and psycho-education to assist patient in compliance and developing  insight into his/her illness   Outcome: Progressing  Goal: Complete daily ADLs, including personal hygiene independently, as able  Description: Interventions:  - Observe, teach, and assist patient with ADLS  - Monitor and promote a balance of rest/activity, with adequate nutrition and elimination   Outcome: Progressing     Problem: Ineffective Coping  Goal: Identifies ineffective coping skills  Outcome: Not Progressing  Goal: Identifies healthy coping skills  Outcome: Progressing  Goal: Demonstrates healthy coping skills  Outcome: Progressing  Goal: Participates in unit activities  Description: Interventions:  - Provide therapeutic environment   - Provide required programming   - Redirect inappropriate behaviors   Outcome: Progressing  Goal: Patient/Family verbalizes awareness of resources  Outcome: Not Progressing     Problem: Depression  Goal: Treatment Goal: Demonstrate behavioral control of depressive symptoms, verbalize feelings of improved mood/affect, and adopt new coping skills prior to discharge  Outcome: Progressing  Goal: Verbalize thoughts and feelings  Description: Interventions:  - Assess and re-assess patient's level of risk   - Engage patient in 1:1 interactions, daily, for a minimum of 15 minutes   - Encourage patient to express feelings, fears, frustrations, hopes   Outcome: Not Progressing  Goal: Refrain from harming self  Description: Interventions:  - Monitor patient closely, per order   - Supervise medication ingestion, monitor effects and side effects   Outcome: Progressing     Problem: Alteration in Orientation  Goal: Interact with staff daily  Description: Interventions:  - Assess and re-assess patient's level of orientation  - Engage patient in 1 on 1 interactions, daily, for a minimum of 15 minutes   - Establish rapport/trust with patient   Outcome: Progressing  Goal: Express concerns related to confused thinking related to:  Description: Interventions:  - Encourage patient to express  feelings, fears, frustrations, hopes  - Assign consistent caregivers   - Hurt/re-orient patient as needed  - Allow comfort items, as appropriate  - Provide visual cues, signs, etc.   Outcome: Not Progressing  Goal: Complete daily ADLs, including personal hygiene independently, as able  Description: Interventions:  - Observe, teach, and assist patient with ADLS  Outcome: Progressing     Problem: Electroconvulsive therapy (ECT)  Goal: Absence of urinary retention  Description: INTERVENTIONS:  - Assess patient’s ability to void and empty bladder  - Monitor I/O  - Bladder scan as needed  - Discuss with physician/AP medications to alleviate retention as needed  - Discuss catheterization for long term situations as appropriate  Outcome: Progressing  Goal: Minimal or absence of nausea and/or vomiting  Description: INTERVENTIONS:  - Administer IV fluids if ordered to ensure adequate hydration  - Maintain NPO status until nausea and vomiting are resolved  - Nasogastric tube if ordered  - Administer ordered antiemetic medications as needed  - Provide nonpharmacologic comfort measures as appropriate  - Advance diet as tolerated, if ordered  - Consider nutrition services referral to assist patient with adequate nutrition and appropriate food choices  Outcome: Progressing  Goal: Maintains adequate nutritional intake  Description: INTERVENTIONS:  - Monitor percentage of each meal consumed  - Identify factors contributing to decreased intake, treat as appropriate  - Assist with meals as needed  - Monitor I&O, weight, and lab values if indicated  - Obtain nutrition services referral as needed  Outcome: Progressing

## 2024-04-30 PROCEDURE — 99232 SBSQ HOSP IP/OBS MODERATE 35: CPT | Performed by: PSYCHIATRY & NEUROLOGY

## 2024-04-30 RX ADMIN — AMLODIPINE BESYLATE 5 MG: 5 TABLET ORAL at 08:41

## 2024-04-30 RX ADMIN — NICOTINE POLACRILEX 2 MG: 2 GUM, CHEWING BUCCAL at 08:45

## 2024-04-30 RX ADMIN — PROPRANOLOL HYDROCHLORIDE 10 MG: 10 TABLET ORAL at 08:40

## 2024-04-30 RX ADMIN — NICOTINE POLACRILEX 2 MG: 2 GUM, CHEWING BUCCAL at 11:04

## 2024-04-30 RX ADMIN — POLYETHYLENE GLYCOL 3350 17 G: 17 POWDER, FOR SOLUTION ORAL at 08:41

## 2024-04-30 RX ADMIN — NICOTINE POLACRILEX 2 MG: 2 GUM, CHEWING BUCCAL at 15:24

## 2024-04-30 RX ADMIN — METFORMIN HYDROCHLORIDE 500 MG: 500 TABLET ORAL at 08:40

## 2024-04-30 RX ADMIN — ESCITALOPRAM OXALATE 20 MG: 10 TABLET, FILM COATED ORAL at 08:41

## 2024-04-30 RX ADMIN — MELATONIN TAB 3 MG 3 MG: 3 TAB at 21:16

## 2024-04-30 RX ADMIN — NICOTINE POLACRILEX 2 MG: 2 GUM, CHEWING BUCCAL at 20:01

## 2024-04-30 RX ADMIN — SENNOSIDES AND DOCUSATE SODIUM 2 TABLET: 8.6; 5 TABLET ORAL at 08:41

## 2024-04-30 RX ADMIN — PROPRANOLOL HYDROCHLORIDE 10 MG: 10 TABLET ORAL at 21:17

## 2024-04-30 RX ADMIN — ERGOCALCIFEROL 50000 UNITS: 1.25 CAPSULE, LIQUID FILLED ORAL at 08:41

## 2024-04-30 RX ADMIN — CYANOCOBALAMIN TAB 1000 MCG 1000 MCG: 1000 TAB at 08:41

## 2024-04-30 RX ADMIN — RISPERIDONE 2 MG: 2 TABLET, FILM COATED ORAL at 21:16

## 2024-04-30 RX ADMIN — NICOTINE POLACRILEX 2 MG: 2 GUM, CHEWING BUCCAL at 17:21

## 2024-04-30 RX ADMIN — SENNOSIDES AND DOCUSATE SODIUM 2 TABLET: 8.6; 5 TABLET ORAL at 17:15

## 2024-04-30 RX ADMIN — CLOZAPINE 500 MG: 100 TABLET ORAL at 21:16

## 2024-04-30 NOTE — PROGRESS NOTES
Psychiatry Progress Note [facility]    Alberto Berumen 28 y.o. male MRN: [id number]  Unit/Bed#: [facility] 101-02 Encounter: [id number]  Code Status: Level 1 - Full Code    PCP: Joce Juan MD    Date of Admission:  3/29/2024 2008   Date of Service:  04/30/24    Patient Active Problem List   Diagnosis    GERD (gastroesophageal reflux disease)    Medical clearance for psychiatric admission    Schizoaffective disorder, bipolar type (HCC)    Tobacco abuse    T wave inversion in EKG    Syringoma    Chronic idiopathic constipation    Vitamin B 12 deficiency    Vitamin D deficiency    Confluent and reticulate papillomatosis    Class 2 obesity in adult    Primary hypertension    Elevated hemoglobin A1c     Review of systems: Unremarkable but missed breakfast yesterday morning as he was sleeping  diagnosis: Schizoaffective bipolar   assessment  Overall Status: Continues to claim to hear voices threatening to kill him and his family and he stays to himself most of the time insisting ECT has helped him to some extent and wanting to more.  Not aggressive or agitated or threatening or self-abusive  Certification Statement: The patient will continue to require additional inpatient hospital stay due to ongoing voices that are threatening to kill him and his family lack of response to medications and ECT so far.     Medications: Clozapine 450 mg at bedtime, propranolol 10 mg every 12 hours as as needed for anxiety, Risperdal 2 mg at bedtime Lexapro 20 mg once a day, atropine eyedrops sublingual for drooling of saliva and senna 2 tablets twice a day for constipation  A all medications reviewed and recommended to be continued for symptom management with increasing clozapine due to ongoing symptoms  Side effects from treatment: None reported  Medication changes   Increase clozapine as 500 mg at bedtime as level was only 520 yesterday evening    Medication education   Risks side effects benefits and precautions of  medications discussed with patient and he did verbalize an understanding about risks for metabolic syndrome from being on neuroleptics and is form tardive dyskinesia etc. and special precautions about being on clozapine   Understanding of medications: Has some understanding   Justification for dual anti-psychotics: Not applicable    Non-pharmacological treatments  Continue with individual, group, milieu and occupational therapy using recovery principles and psycho-education about accepting illness and the need for treatment.   to contact aunt and explore ACT team referral which he never had  ECT to be continued  weekly for maintenance starting on 4/12/2024  Refuses to see a dietician and agrees to do more exercises as h eis about 100 lbs overweight     Safety  Safety and communication plan established to target dynamic risk factors discussed above.    Discharge Plan   Back to his aunt with an ACT team    Interval Progress   Isolated withdrawn staying on his bed in his room most of the time to the point of even missing breakfast yesterday morning.  Lab work reviewed which shows Depakote level 520 rest of lab work unremarkable.  Still with same voices threatening to kill him and his family and he claims ECT has definitely helped and is actually asking for more.  Not aggressive or agitated or threatening or self-abusive.  Remains polite friendly pleasant when approached bright in interaction       Acceptance by patient: Accepting  Hopefulness in recovery: Living with his aunt again  Involved in reintegration process: Talking to his aunt and sister  Trusting in relationship with psychiatrist: Trusting  Sleep: Good  Appetite: Good  Compliance with Medications: Compliant  Group attendance: Attending only a few groups 7/8  Significant events: Continues to have same voices that are threatening to kill him and his family but able to keep his controls and not aggressive or agitated or threatening or  self-abusive    Mental Status Exam  Appearance: age appropriate, improved grooming, looks older than stated age, overweight, with hair in dreadlocks casually dressed with a clean shaven face, fairly groomed with good eye contact pacing the hallways wide-awake wearing hospital clothing  Behavior: cooperative, mildly anxious, evasive, gesturing, slow responses.  Continues to remain preoccupied   speech: normal rate, normal volume, normal pitch  Mood: dysphoric, anxious, reports he feels good appearing anxious preoccupied affect: constricted, inappropriate, mood-congruent  Thought Process: organized, logical, coherent, goal directed, linear, decreased rate of thoughts, slowing of thoughts, negative thinking, impaired abstract reasoning, concrete  Thought Content: paranoid ideation, some paranoia, grandiose ideas, intrusive thoughts, preoccupied, chronic, continues to report paranoia about people threatening to kill him and his family because of the voices.  He believes ECT has helped so that he is not much in his head.  No other delusions elicited.  No current suicidal or homicidal thoughts and no plans verbalized.  No phobias obsessions compulsions or distorted body perceptions reported.  Preoccupied with wanting to get ECTs more often despite reminding him that it may impair his memory  Perceptual Disturbances: Continues to hear same threatening voices to kill him and his family  Hx Risk Factors: chronic psychiatric problems, chronic anxiety symptoms, chronic psychotic symptoms, his aunt possibly moving away  Sensorium: Oriented x 3 spheres and situation  Cognition: recent and remote memory grossly intact  Consciousness: alert and awake  Attention: attention span and concentration are age appropriate  Intellect: appears to be of average intelligence  Insight: intact  Judgement: intact  Motor Activity: no abnormal movements     Vitals  Temp:  [97.6 °F (36.4 °C)-97.8 °F (36.6 °C)] 97.8 °F (36.6 °C)  HR:  []  96  Resp:  [18] 18  BP: (132-155)/() 132/79  SpO2:  [100 %] 100 %  No intake or output data in the 24 hours ending 04/30/24 0816            Lab Results: All Labs For Current Hospital Admission Reviewed     Current Facility-Administered Medications   Medication Dose Route Frequency Provider Last Rate    acetaminophen  650 mg Oral Q4H PRN Jordan C Holter, DO      acetaminophen  650 mg Oral Q6H PRN HOLLI Lion      aluminum-magnesium hydroxide-simethicone  30 mL Oral Q4H PRN Jordan C Holter, DO      amLODIPine  5 mg Oral Daily HOLLI Lion      Artificial Tears  1 drop Both Eyes Q3H PRN Jordan C Holter, DO      atropine  1 drop Sublingual HS HOLLI Lion      atropine  1 drop Sublingual Daily PRN HOLLI Lion      haloperidol lactate  2.5 mg Intramuscular Q4H PRN Max 4/day HOLLI Lion      And    LORazepam  1 mg Intramuscular Q4H PRN Max 4/day HOLLI Lion      And    benztropine  0.5 mg Intramuscular Q4H PRN Max 4/day HOLLI Lion      benztropine  1 mg Intramuscular Q4H PRN Max 6/day Jordan C Holter,       haloperidol lactate  5 mg Intramuscular Q4H PRN Max 4/day HOLLI Lion      And    LORazepam  2 mg Intramuscular Q4H PRN Max 4/day HOLLI Lion      And    benztropine  1 mg Intramuscular Q4H PRN Max 4/day HOLLI Lion      benztropine  1 mg Oral Q4H PRN Max 6/day HOLLI Lion      benztropine  1 mg Oral Q4H PRN Max 6/day Jordan C Holter, DO      bisacodyl  10 mg Rectal Daily PRN HOLLI Lion      cloZAPine  500 mg Oral HS Bora Rosario MD      cyanocobalamin  1,000 mcg Oral Daily HOLLI Galvan      hydrOXYzine HCL  50 mg Oral Q6H PRN Max 4/day Jordan C Holter, DO      Or    diphenhydrAMINE  50 mg Intramuscular Q6H PRN Jordan C Holter, DO      hydrOXYzine HCL  50 mg Oral Q6H PRN Max 4/day HOLLI Lion      Or    diphenhydrAMINE  50 mg Intramuscular Q6H PRN HOLLI Lion      diphenhydrAMINE-zinc acetate    Topical BID PRN HOLLI Lion      ergocalciferol  50,000 Units Oral Weekly HOLLI Galvan      escitalopram  20 mg Oral Daily HOLLI Lion      haloperidol  1 mg Oral Q6H PRN HOLLI Lion      haloperidol  2.5 mg Oral Q4H PRN Max 4/day HOLLI Lion      haloperidol  5 mg Oral Q4H PRN Max 4/day HOLLI Lion      hydrocortisone   Topical 4x Daily PRN HOLLI Lion      hydrOXYzine HCL  100 mg Oral Q6H PRN Max 4/day [facility] Holter, DO      Or    LORazepam  2 mg Intramuscular Q6H PRN [facility] Holter, DO      hydrOXYzine HCL  100 mg Oral Q6H PRN Max 4/day HOLLI Lion      Or    LORazepam  2 mg Intramuscular Q6H PRN HOLLI Lion      hydrOXYzine HCL  25 mg Oral Q6H PRN Max 4/day [facility] Holter, DO      ibuprofen  600 mg Oral Q8H PRN HOLLI Lion      melatonin  3 mg Oral HS [facility] Holter, DO      metFORMIN  500 mg Oral Daily With Breakfast HOLLI Lion      methocarbamol  500 mg Oral Q6H PRN HOLLI Lion      nicotine polacrilex  2 mg Oral Q2H PRN [facility] Holter, DO      OLANZapine  5 mg Oral Q4H PRN Max 3/day [facility] Holter, DO      Or    OLANZapine  2.5 mg Intramuscular Q4H PRN Max 3/day [facility] Holter, DO      OLANZapine  5 mg Oral Q3H PRN Max 3/day [facility] Holter, DO      Or    OLANZapine  5 mg Intramuscular Q3H PRN Max 3/day [facility] Holter, DO      OLANZapine  2.5 mg Oral Q4H PRN Max 6/day [facility] Holter, DO      ondansetron  4 mg Oral Q6H PRN HOLLI Lion      polyethylene glycol  17 g Oral Daily HOLLI Lion      polyethylene glycol  17 g Oral Daily PRN [facility] Holter, DO      propranolol  10 mg Oral Q12H [facility] HOLLI Lion      risperiDONE  2 mg Oral HS Bora Rosario MD      senna-docusate sodium  1 tablet Oral Daily PRN [facility] Holter, DO      senna-docusate sodium  2 tablet Oral BID HOLLI Lion      traZODone  50 mg Oral HS PRN HOLLI Lion      white petrolatum-mineral oil   Topical TID PRN  HOLLI Lion         Counseling / Coordination of Care: Total floor / unit time spent today 15 minutes. Greater than 50% of total time was spent with the patient and / or family counseling and / or somewhat receptive to supportive listening and teaching positive coping skills to deal with symptom mangement.     Patient's Rights, confidentiality and exceptions to confidentiality, use of automated medical record, [facility] staff access to medical record, and consent to treatment reviewed.    This note has been dictated and hence there may be problems with punctuation, spelling and formatting and if anyone has any concerns please address them to Dr. Rosario   This note is not shared with patient due to potential for making patient's condition worse by knowing the content of the note.

## 2024-04-30 NOTE — PROGRESS NOTES
04/30/24 0744   Team Meeting   Meeting Type Daily Rounds   Team Members Present   Team Members Present Physician;Nurse;;Other (Discipline and Name)   Patient/Family Present   Patient Present No   Patient's Family Present No     In attendance:  Dr. Alex Thomas, MD Dr. Jordan Holter, DO Akira Kunz, PASORAYA Taylor, RN  Luisana Alvarado, [facility]  Carla Lux, W  MATILDA Daniel.S.    Groups: 7/8    Pt had labs completed yesterday. Clozaril increased from 450 to 500mg. Pt has been more visible and social on the unit. No bx concerns noted.

## 2024-04-30 NOTE — PLAN OF CARE
Problem: Alteration in Thoughts and Perception  Goal: Treatment Goal: Gain control of psychotic behaviors/thinking, reduce/eliminate presenting symptoms and demonstrate improved reality functioning upon discharge  Outcome: Progressing  Goal: Verbalize thoughts and feelings  Description: Interventions:  - Promote a nonjudgmental and trusting relationship with the patient through active listening and therapeutic communication  - Assess patient's level of functioning, behavior and potential for risk  - Engage patient in 1 on 1 interactions  - Encourage patient to express fears, feelings, frustrations, and discuss symptoms    - [place] patient to reality, help patient recognize reality-based thinking   - Administer medications as ordered and assess for potential side effects  - Provide the patient education related to the signs and symptoms of the illness and desired effects of prescribed medications  Outcome: Progressing  Goal: Refrain from acting on delusional thinking/internal stimuli  Description: Interventions:  - Monitor patient closely, per order   - Utilize least restrictive measures   - Set reasonable limits, give positive feedback for acceptable   - Administer medications as ordered and monitor of potential side effects  Outcome: Progressing  Goal: Attend and participate in unit activities, including therapeutic, recreational, and educational groups  Description: Interventions:  -Encourage Visitation and family involvement in care  Outcome: Progressing  Goal: Complete daily ADLs, including personal hygiene independently, as able  Description: Interventions:  - Observe, teach, and assist patient with ADLS  - Monitor and promote a balance of rest/activity, with adequate nutrition and elimination   Outcome: Progressing     Problem: Ineffective Coping  Goal: Participates in unit activities  Description: Interventions:  - Provide therapeutic environment   - Provide required programming   - Redirect inappropriate  behaviors   Outcome: Progressing  Goal: Patient/Family participate in treatment and DC plans  Description: Interventions:  - Provide therapeutic environment  Outcome: Progressing     Problem: Depression  Goal: Refrain from harming self  Description: Interventions:  - Monitor patient closely, per order   - Supervise medication ingestion, monitor effects and side effects   Outcome: Progressing  Goal: Refrain from isolation  Description: Interventions:  - Develop a trusting relationship   - Encourage socialization   Outcome: Progressing  Goal: Refrain from self-neglect  Outcome: Progressing     Problem: Alteration in Orientation  Goal: Interact with staff daily  Description: Interventions:  - Assess and re-assess patient's level of orientation  - Engage patient in 1 on 1 interactions, daily, for a minimum of 15 minutes   - Establish rapport/trust with patient   Outcome: Progressing  Goal: Cooperate with recommended testing/procedures  Description: Interventions:  - Determine need for ancillary testing  - Observe for mental status changes  - Implement falls/precaution protocol   Outcome: Progressing     Problem: Electroconvulsive therapy (ECT)  Goal: Treatment Goal: Demonstrate a reduction of confusion and improved orientation to person, place, time and/or situation upon discharge, according to optimum baseline/ability  Outcome: Progressing  Goal: Verbalizes/displays adequate comfort level or baseline comfort level  Description: Interventions:  - Encourage patient to monitor pain and request assistance  - Assess pain using appropriate pain scale  - Administer analgesics based on type and severity of pain and evaluate response  - Implement non-pharmacological measures as appropriate and evaluate response  - Consider cultural and social influences on pain and pain management  - Notify physician/advanced practitioner if interventions unsuccessful or patient reports new pain  Outcome: Progressing  Goal: Maintains adequate  nutritional intake  Description: INTERVENTIONS:  - Monitor percentage of each meal consumed  - Identify factors contributing to decreased intake, treat as appropriate  - Assist with meals as needed  - Monitor I&O, weight, and lab values if indicated  - Obtain nutrition services referral as needed  Outcome: Progressing

## 2024-04-30 NOTE — NURSING NOTE
Patient is pleasant and cooperative. Patient is interactive with peers and staff. Patient complaint with medications. Patient attended groups.

## 2024-05-01 PROCEDURE — 99232 SBSQ HOSP IP/OBS MODERATE 35: CPT | Performed by: PSYCHIATRY & NEUROLOGY

## 2024-05-01 RX ADMIN — NICOTINE POLACRILEX 2 MG: 2 GUM, CHEWING BUCCAL at 08:37

## 2024-05-01 RX ADMIN — SENNOSIDES AND DOCUSATE SODIUM 2 TABLET: 8.6; 5 TABLET ORAL at 08:36

## 2024-05-01 RX ADMIN — RISPERIDONE 2 MG: 2 TABLET, FILM COATED ORAL at 21:51

## 2024-05-01 RX ADMIN — NICOTINE POLACRILEX 2 MG: 2 GUM, CHEWING ORAL at 13:38

## 2024-05-01 RX ADMIN — ESCITALOPRAM OXALATE 20 MG: 10 TABLET, FILM COATED ORAL at 08:36

## 2024-05-01 RX ADMIN — AMLODIPINE BESYLATE 5 MG: 5 TABLET ORAL at 08:37

## 2024-05-01 RX ADMIN — METFORMIN HYDROCHLORIDE 500 MG: 500 TABLET ORAL at 08:37

## 2024-05-01 RX ADMIN — ATROPINE SULFATE 1 DROP: 10 SOLUTION/ DROPS OPHTHALMIC at 21:50

## 2024-05-01 RX ADMIN — PROPRANOLOL HYDROCHLORIDE 10 MG: 10 TABLET ORAL at 08:37

## 2024-05-01 RX ADMIN — NICOTINE POLACRILEX 2 MG: 2 GUM, CHEWING ORAL at 21:50

## 2024-05-01 RX ADMIN — PROPRANOLOL HYDROCHLORIDE 10 MG: 10 TABLET ORAL at 21:51

## 2024-05-01 RX ADMIN — MELATONIN TAB 3 MG 3 MG: 3 TAB at 21:51

## 2024-05-01 RX ADMIN — CYANOCOBALAMIN TAB 1000 MCG 1000 MCG: 1000 TAB at 08:37

## 2024-05-01 RX ADMIN — POLYETHYLENE GLYCOL 3350 17 G: 17 POWDER, FOR SOLUTION ORAL at 08:36

## 2024-05-01 RX ADMIN — NICOTINE POLACRILEX 2 MG: 2 GUM, CHEWING ORAL at 17:39

## 2024-05-01 RX ADMIN — SENNOSIDES AND DOCUSATE SODIUM 2 TABLET: 8.6; 5 TABLET ORAL at 17:10

## 2024-05-01 RX ADMIN — CLOZAPINE 500 MG: 100 TABLET ORAL at 21:50

## 2024-05-01 NOTE — NURSING NOTE
Patient is visible on the unit and social with peers and staff. He is pleasant and cooperative. Patient had no complaints. Medication compliant. Attends groups and engages in activities with peers in free time.

## 2024-05-01 NOTE — PROGRESS NOTES
Progress Note - Behavioral Health   Alberto Berumen 28 y.o. male MRN: [id number]  Unit/Bed#: [facility] 101-02 Encounter: [id number]    Assessment/Plan   Principal Problem:    Schizoaffective disorder, bipolar type ([facility])  Active Problems:    GERD (gastroesophageal reflux disease)    Medical clearance for psychiatric admission    Tobacco abuse    T wave inversion in EKG    Chronic idiopathic constipation    Confluent and reticulate papillomatosis    Primary hypertension    Elevated hemoglobin A1c      Behavior over the last 24 hours:  improved  Sleep: normal  Appetite: normal  Medication side effects: No  ROS: no complaints    Subjective: Patient is a 28 year old male with schizoaffective disorder, bipolar type. He attended 8/10 groups yesterday. He is experiencing auditory hallucinations, notes there are multiple voices telling him they will harm him and his family. He notes that he does not feel paranoid. He reports that ECT is still going well -- states that sometimes the voices are louder and sometimes he feels good. He denies noticing a pattern, states that the range in presence of voices is random in pattern. He states that he slept well, and that nothing awoke him. He states that he has dreams where the voices are present but denies nightmares. Staff states that he is less depressed, is less isolative, and is more social with peers.     Assessment: He is improving with use of ECT, medications, and therapy though auditory hallucinations are still present.     Mental Status Evaluation:  Appearance:  age appropriate, casually dressed, and well groomed. He is wearing hospital attire -- grey patient gown and teal loose pants. He has black hair in dreadlocks. He appears to have appropriate hygiene.    Behavior:  psychomotor retardation and cooperative, pleasant, calm. Makes good eye contact. Yawning frequently but is present to conversation.    Speech:  increased latency of response, normal volume, normal pitch.    Mood:  " \"Stable\"   Affect:  blunted and mood-congruent   Thought Process:  goal directed and logical   Associations: intact associations   Thought Content:  He denies experiences leading to persecutory, grandiose, somatic, or bizarre delusions. Denies SI, HI, obsessions, compulsions, or distorted body perceptions. He does not appear to be hypervigilant.    Perceptual Disturbances: Auditory hallucinations without commands; he reports multiple voices constantly telling him they will harm him and his family. Denies visual, gustatory, tactile, or olfactory hallucinations. Denies illusions, feelings of depersonalization or derealization. He does not appear to be responding to internal stimuli.   Risk Potential: Suicidal Ideations none  Homicidal Ideations none  Potential for Aggression No   Sensorium:  person, place, time/date, situation, and day of week   Memory:  recent memory intact, remote memory intact, immediate memory intact  Immediate memory: able to recall the 3 unrelated words (kiwi, leg, green)  Recent memory: able to relay who the current president is; able to recall the 3 unrelated words with some difficulty and required some guidance; expressed what he had for breakfast    Consciousness:  alert    Attention: attention span and concentration were age appropriate  Attention span: able to repeat a phone number after needing it repeated once ([phone])  Concentration: able to spell \"build\" backwards and forwards without error   Intelligence: Fund of general knowledge: unsure of how many states in the [place]; unsure of the 2 states outside of the [place]  Calculation ability: able to calculate 6x5 without difficulty  Abstract interpretation: expressed the similarity between arm and foot (both a part of your body)  Problem solving: able to relay why a paper clip would move toward a magnet   Impulse control: Does not appear to have poor impulse control.    Insight:  age appropriate; he is aware of his diagnosis and " the need for therapy and medications   Judgment: age appropriate; he would try to find the nearest exit if he someone yelled fire in a shopping mall; he is dressed appropriately and he is acting appropriately.    Gait/Station: normal gait/station and slow   Motor Activity: no abnormal movements     Progress Toward Goals: he is improving with use of ECT. He understands the need for group therapy.     Discharge plan: Plan is to discharge to his aunt's house with an ACT team established to ensure he maintains his medications.     Recommended Treatment: Continue with group therapy, milieu therapy and occupational therapy.      Risks, benefits and possible side effects of Medications:   Risks, benefits, and possible side effects of medications explained to patient and patient verbalizes understanding.      Medications: all current active meds have been reviewed.    Labs: I have personally reviewed all pertinent laboratory/tests results.     Counseling / Coordination of Care  Total floor / unit time spent today 30 minutes. Greater than 50% of total time was spent with the patient and / or family counseling and / or coordination of care.

## 2024-05-01 NOTE — PLAN OF CARE
Problem: Alteration in Thoughts and Perception  Goal: Verbalize thoughts and feelings  Description: Interventions:  - Promote a nonjudgmental and trusting relationship with the patient through active listening and therapeutic communication  - Assess patient's level of functioning, behavior and potential for risk  - Engage patient in 1 on 1 interactions  - Encourage patient to express fears, feelings, frustrations, and discuss symptoms    - [place] patient to reality, help patient recognize reality-based thinking   - Administer medications as ordered and assess for potential side effects  - Provide the patient education related to the signs and symptoms of the illness and desired effects of prescribed medications  Outcome: Not Progressing  Goal: Refrain from acting on delusional thinking/internal stimuli  Description: Interventions:  - Monitor patient closely, per order   - Utilize least restrictive measures   - Set reasonable limits, give positive feedback for acceptable   - Administer medications as ordered and monitor of potential side effects  Outcome: Not Progressing  Goal: Agree to be compliant with medication regime, as prescribed and report medication side effects  Description: Interventions:  - Offer appropriate PRN medication and supervise ingestion; conduct AIMS, as needed   Outcome: Not Progressing  Goal: Attend and participate in unit activities, including therapeutic, recreational, and educational groups  Description: Interventions:  -Encourage Visitation and family involvement in care  Outcome: Not Progressing  Goal: Recognize dysfunctional thoughts, communicate reality-based thoughts at the time of discharge  Description: Interventions:  - Provide medication and psycho-education to assist patient in compliance and developing insight into his/her illness   Outcome: Not Progressing  Goal: Complete daily ADLs, including personal hygiene independently, as able  Description: Interventions:  - Observe,  teach, and assist patient with ADLS  - Monitor and promote a balance of rest/activity, with adequate nutrition and elimination   Outcome: Not Progressing     Problem: Ineffective Coping  Goal: Identifies ineffective coping skills  Outcome: Not Progressing  Goal: Identifies healthy coping skills  Outcome: Not Progressing  Goal: Demonstrates healthy coping skills  Outcome: Not Progressing  Goal: Participates in unit activities  Description: Interventions:  - Provide therapeutic environment   - Provide required programming   - Redirect inappropriate behaviors   Outcome: Not Progressing  Goal: Patient/Family participate in treatment and DC plans  Description: Interventions:  - Provide therapeutic environment  Outcome: Not Progressing  Goal: Patient/Family verbalizes awareness of resources  Outcome: Not Progressing     Problem: Depression  Goal: Verbalize thoughts and feelings  Description: Interventions:  - Assess and re-assess patient's level of risk   - Engage patient in 1:1 interactions, daily, for a minimum of 15 minutes   - Encourage patient to express feelings, fears, frustrations, hopes   Outcome: Not Progressing  Goal: Refrain from harming self  Description: Interventions:  - Monitor patient closely, per order   - Supervise medication ingestion, monitor effects and side effects   Outcome: Not Progressing  Goal: Refrain from isolation  Description: Interventions:  - Develop a trusting relationship   - Encourage socialization   Outcome: Not Progressing  Goal: Attend and participate in unit activities, including therapeutic, recreational, and educational groups  Description: Interventions:  - Provide therapeutic and educational activities daily, encourage attendance and participation, and document same in the medical record   Outcome: Not Progressing  Goal: Complete daily ADLs, including personal hygiene independently, as able  Description: Interventions:  - Observe, teach, and assist patient with ADLS  -  Monitor  and promote a balance of rest/activity, with adequate nutrition and elimination   Outcome: Not Progressing     Problem: Anxiety  Goal: Anxiety is at manageable level  Description: Interventions:  - Assess and monitor patient's anxiety level.   - Monitor for signs and symptoms (heart palpitations, chest pain, shortness of breath, headaches, nausea, feeling jumpy, restlessness, irritable, apprehensive).   - Collaborate with interdisciplinary team and initiate plan and interventions as ordered.  - [place] patient to unit/surroundings  - Explain treatment plan  - Encourage participation in care  - Encourage verbalization of concerns/fears  - Identify coping mechanisms  - Assist in developing anxiety-reducing skills  - Administer/offer alternative therapies  - Limit or eliminate stimulants  Outcome: Not Progressing     Problem: Individualized Interventions  Goal: Patient will verbalize appropriate use of telephone within 5 days  Description: Interventions:  - Treatment team to determine use of supervised phone privileges   Outcome: Not Progressing  Goal: Patient will verbalize need for hospitalization and will no longer attempt elopement within 5 days  Description: Interventions:  - Ongoing education to help patient understand need for hospitalization  Outcome: Not Progressing  Goal: Patient will recognize inappropriate behaviors and develop alternative behaviors within 5 days  Description: Interventions:  - Patient in collaboration with Treatment Team will develop a behavior management plan to help identify effective coping skills to deal with stressors  Outcome: Not Progressing     Problem: Electroconvulsive therapy (ECT)  Goal: Verbalizes/displays adequate comfort level or baseline comfort level  Description: Interventions:  - Encourage patient to monitor pain and request assistance  - Assess pain using appropriate pain scale  - Administer analgesics based on type and severity of pain and evaluate response  - Implement  non-pharmacological measures as appropriate and evaluate response  - Consider cultural and social influences on pain and pain management  - Notify physician/advanced practitioner if interventions unsuccessful or patient reports new pain  Outcome: Not Progressing  Goal: Achieves stable or improved neurological status  Description: INTERVENTIONS  - Monitor and report changes in neurological status  - Monitor vital signs such as temperature, blood pressure, glucose, and any other labs ordered   - Initiate measures to prevent increased intracranial pressure  - Monitor for seizure activity and implement precautions if appropriate      Outcome: Not Progressing  Goal: Achieves maximal functionality and self care  Description: INTERVENTIONS  - Monitor swallowing and airway patency with patient fatigue and changes in neurological status  - Encourage and assist patient to increase activity and self care.   - Encourage visually impaired, hearing impaired and aphasic patients to use assistive/communication devices  Outcome: Not Progressing  Goal: Maintain or return mobility to safest level of function  Description: INTERVENTIONS:  - Assess patient's ability to carry out ADLs; assess patient's baseline for ADL function and identify physical deficits which impact ability to perform ADLs (bathing, care of mouth/teeth, toileting, grooming, dressing, etc.)  - Assess/evaluate cause of self-care deficits   - Assess range of motion  - Assess patient's mobility  - Assess patient's need for assistive devices and provide as appropriate  - Encourage maximum independence but intervene and supervise when necessary  - Involve family in performance of ADLs  - Assess for home care needs following discharge   - Consider OT consult to assist with ADL evaluation and planning for discharge  - Provide patient education as appropriate  Outcome: Not Progressing  Goal: Absence of urinary retention  Description: INTERVENTIONS:  - Assess patient’s  ability to void and empty bladder  - Monitor I/O  - Bladder scan as needed  - Discuss with physician/AP medications to alleviate retention as needed  - Discuss catheterization for long term situations as appropriate  Outcome: Progressing  Goal: Minimal or absence of nausea and/or vomiting  Description: INTERVENTIONS:  - Administer IV fluids if ordered to ensure adequate hydration  - Maintain NPO status until nausea and vomiting are resolved  - Nasogastric tube if ordered  - Administer ordered antiemetic medications as needed  - Provide nonpharmacologic comfort measures as appropriate  - Advance diet as tolerated, if ordered  - Consider nutrition services referral to assist patient with adequate nutrition and appropriate food choices  Outcome: Progressing  Goal: Maintains adequate nutritional intake  Description: INTERVENTIONS:  - Monitor percentage of each meal consumed  - Identify factors contributing to decreased intake, treat as appropriate  - Assist with meals as needed  - Monitor I&O, weight, and lab values if indicated  - Obtain nutrition services referral as needed  Outcome: Progressing     Problem: DISCHARGE PLANNING - CARE MANAGEMENT  Goal: Discharge to post-acute care or home with appropriate resources  Description: INTERVENTIONS:  - Conduct assessment to determine patient/family and health care team treatment goals, and need for post-acute services based on payer coverage, community resources, and patient preferences, and barriers to discharge  - Address psychosocial, clinical, and financial barriers to discharge as identified in assessment in conjunction with the patient/family and health care team  - Arrange appropriate level of post-acute services according to patient’s   needs and preference and payer coverage in collaboration with the physician and health care team  - Communicate with and update the patient/family, physician, and health care team regarding progress on the discharge plan  - Arrange  appropriate transportation to post-acute venues  Outcome: Not Progressing

## 2024-05-01 NOTE — PROGRESS NOTES
05/01/24 0730   Team Meeting   Meeting Type Daily Rounds   Team Members Present   Team Members Present Physician;Nurse;;Other (Discipline and Name)   Patient/Family Present   Patient Present No   Patient's Family Present No     In attendance:  Dr. Alex Thomas, MD Dr. Jordan Holter, DO Verónica Jo, PASORAYA Travis, MIGUEL Alvarado, [facility]  Carla Lux, W  MATILDA Daniel.S.    Groups: 8/10    Pt has been more visible and engaged. Pt reports ongoing auditory hallucinations but reports feeling less depressed. Pt has been more social. ECT scheduled for Friday.

## 2024-05-01 NOTE — PLAN OF CARE
Problem: Ineffective Coping  Goal: Identifies ineffective coping skills  Outcome: Progressing  Goal: Identifies healthy coping skills  Outcome: Progressing  Goal: Demonstrates healthy coping skills  Outcome: Progressing  Goal: Participates in unit activities  Description: Interventions:  - Provide therapeutic environment   - Provide required programming   - Redirect inappropriate behaviors   Outcome: Progressing   Alberto attended 15/18 groups offered during the past 2 days.

## 2024-05-01 NOTE — NURSING NOTE
This RN arrived at 1900.     This patient has been visible all shift. Social with select peers. More verbal with staff. Good eye contact.  Smiles upon approach.  Denies suicidal ideations. Medication compliant.  Offers no current complaints. Appetite excellent. Attends most groups. Pleasant and cooperative.

## 2024-05-02 LAB
BASOPHILS # BLD AUTO: 0.06 THOUSANDS/ÂΜL (ref 0–0.1)
BASOPHILS NFR BLD AUTO: 1 % (ref 0–1)
EOSINOPHIL # BLD AUTO: 0.3 THOUSAND/ÂΜL (ref 0–0.61)
EOSINOPHIL NFR BLD AUTO: 3 % (ref 0–6)
ERYTHROCYTE [DISTWIDTH] IN BLOOD BY AUTOMATED COUNT: 14.7 % (ref 11.6–15.1)
HCT VFR BLD AUTO: 40.6 % (ref 36.5–49.3)
HGB BLD-MCNC: 11.8 G/DL (ref 12–17)
IMM GRANULOCYTES # BLD AUTO: 0.06 THOUSAND/UL (ref 0–0.2)
IMM GRANULOCYTES NFR BLD AUTO: 1 % (ref 0–2)
LYMPHOCYTES # BLD AUTO: 2.92 THOUSANDS/ÂΜL (ref 0.6–4.47)
LYMPHOCYTES NFR BLD AUTO: 32 % (ref 14–44)
MCH RBC QN AUTO: 23.5 PG (ref 26.8–34.3)
MCHC RBC AUTO-ENTMCNC: 29.1 G/DL (ref 31.4–37.4)
MCV RBC AUTO: 81 FL (ref 82–98)
MONOCYTES # BLD AUTO: 0.99 THOUSAND/ÂΜL (ref 0.17–1.22)
MONOCYTES NFR BLD AUTO: 11 % (ref 4–12)
NEUTROPHILS # BLD AUTO: 4.73 THOUSANDS/ÂΜL (ref 1.85–7.62)
NEUTS SEG NFR BLD AUTO: 52 % (ref 43–75)
NRBC BLD AUTO-RTO: 0 /100 WBCS
PLATELET # BLD AUTO: 237 THOUSANDS/UL (ref 149–390)
PMV BLD AUTO: 10.4 FL (ref 8.9–12.7)
RBC # BLD AUTO: 5.02 MILLION/UL (ref 3.88–5.62)
WBC # BLD AUTO: 9.06 THOUSAND/UL (ref 4.31–10.16)

## 2024-05-02 PROCEDURE — 99232 SBSQ HOSP IP/OBS MODERATE 35: CPT | Performed by: PSYCHIATRY & NEUROLOGY

## 2024-05-02 PROCEDURE — 85025 COMPLETE CBC W/AUTO DIFF WBC: CPT

## 2024-05-02 RX ADMIN — RISPERIDONE 2 MG: 2 TABLET, FILM COATED ORAL at 21:11

## 2024-05-02 RX ADMIN — POLYETHYLENE GLYCOL 3350 17 G: 17 POWDER, FOR SOLUTION ORAL at 08:28

## 2024-05-02 RX ADMIN — CYANOCOBALAMIN TAB 1000 MCG 1000 MCG: 1000 TAB at 08:32

## 2024-05-02 RX ADMIN — MELATONIN TAB 3 MG 3 MG: 3 TAB at 21:11

## 2024-05-02 RX ADMIN — NICOTINE POLACRILEX 2 MG: 2 GUM, CHEWING ORAL at 21:11

## 2024-05-02 RX ADMIN — PROPRANOLOL HYDROCHLORIDE 10 MG: 10 TABLET ORAL at 21:11

## 2024-05-02 RX ADMIN — METFORMIN HYDROCHLORIDE 500 MG: 500 TABLET ORAL at 08:28

## 2024-05-02 RX ADMIN — ESCITALOPRAM OXALATE 20 MG: 10 TABLET, FILM COATED ORAL at 08:27

## 2024-05-02 RX ADMIN — CLOZAPINE 500 MG: 100 TABLET ORAL at 21:11

## 2024-05-02 RX ADMIN — SENNOSIDES AND DOCUSATE SODIUM 2 TABLET: 8.6; 5 TABLET ORAL at 17:06

## 2024-05-02 RX ADMIN — NICOTINE POLACRILEX 2 MG: 2 GUM, CHEWING ORAL at 12:48

## 2024-05-02 RX ADMIN — SENNOSIDES AND DOCUSATE SODIUM 2 TABLET: 8.6; 5 TABLET ORAL at 08:28

## 2024-05-02 RX ADMIN — NICOTINE POLACRILEX 2 MG: 2 GUM, CHEWING ORAL at 17:06

## 2024-05-02 RX ADMIN — PROPRANOLOL HYDROCHLORIDE 10 MG: 10 TABLET ORAL at 08:28

## 2024-05-02 RX ADMIN — NICOTINE POLACRILEX 2 MG: 2 GUM, CHEWING ORAL at 08:27

## 2024-05-02 RX ADMIN — AMLODIPINE BESYLATE 5 MG: 5 TABLET ORAL at 08:27

## 2024-05-02 NOTE — SOCIAL WORK
Pt had in person visit with his aunt. Pt was receptive to time constraints and wrapped up after time warning. Aunt was polite and adhered to rules of unit. Pt was visibly excited and happy to see her. Aunt brought clothing and toiletries for pt that were given to staff for inventory.   SW walked aunt in and out of the unit. SW provided aunt with direct number for SW, patient line and EAC line at her request.

## 2024-05-02 NOTE — NURSING NOTE
Received pt in bed at change of shift with eyes closed; chest movement noted.  Continues the same thus this far as per q 7 min room checks.   Will continue to monitor behavior, sleeping pattern and any medical issues that may arise.    0548:  CBCD obtained by this nurse as ordered and delivered to our lab.

## 2024-05-02 NOTE — SOCIAL WORK
Pt's Aunt Hanh called to inquire about in person visit with pt that was discussed for today, 5/2. SW inquired if she received SW's voicemail from last week; aunt reports her phone was broken.   BRANDY confirmed aunt would like to attend an in person visit with pt today at 3pm. SW notified staff and pt.     Aunt informed SW that she needs to come in earlier due to picking up another family member and the drive being so far. BRANDY placed call at 1:50pm to determine arrival time; left voicemail.

## 2024-05-02 NOTE — PROGRESS NOTES
05/02/24 0732   Team Meeting   Meeting Type Daily Rounds   Team Members Present   Team Members Present Physician;Nurse;;Other (Discipline and Name)   Patient/Family Present   Patient Present No   Patient's Family Present No     In attendance:  Dr. Alex Thomas, MD Dr. Jordan Holter, DO Verónica Jo, JOSÉ MIGUEL Taylor, RN  Luisana Alvarado, [facility]  Carla Lux, W  Irais Mcdowell M.S.    Groups: 5/9    Pt reports voices are the same, telling him they will hurt him and/or his family. Pt is scheduled for ECT tomorrow. No bx issues noted. Pt is more social and visible on the unit.

## 2024-05-02 NOTE — PLAN OF CARE
Problem: Alteration in Thoughts and Perception  Goal: Treatment Goal: Gain control of psychotic behaviors/thinking, reduce/eliminate presenting symptoms and demonstrate improved reality functioning upon discharge  Outcome: Progressing  Goal: Verbalize thoughts and feelings  Description: Interventions:  - Promote a nonjudgmental and trusting relationship with the patient through active listening and therapeutic communication  - Assess patient's level of functioning, behavior and potential for risk  - Engage patient in 1 on 1 interactions  - Encourage patient to express fears, feelings, frustrations, and discuss symptoms    - [place] patient to reality, help patient recognize reality-based thinking   - Administer medications as ordered and assess for potential side effects  - Provide the patient education related to the signs and symptoms of the illness and desired effects of prescribed medications  Outcome: Progressing  Goal: Refrain from acting on delusional thinking/internal stimuli  Description: Interventions:  - Monitor patient closely, per order   - Utilize least restrictive measures   - Set reasonable limits, give positive feedback for acceptable   - Administer medications as ordered and monitor of potential side effects  Outcome: Progressing  Goal: Agree to be compliant with medication regime, as prescribed and report medication side effects  Description: Interventions:  - Offer appropriate PRN medication and supervise ingestion; conduct AIMS, as needed   Outcome: Progressing  Goal: Attend and participate in unit activities, including therapeutic, recreational, and educational groups  Description: Interventions:  -Encourage Visitation and family involvement in care  Outcome: Progressing  Goal: Complete daily ADLs, including personal hygiene independently, as able  Description: Interventions:  - Observe, teach, and assist patient with ADLS  - Monitor and promote a balance of rest/activity, with adequate  nutrition and elimination   Outcome: Progressing     Problem: Ineffective Coping  Goal: Demonstrates healthy coping skills  Outcome: Progressing  Goal: Participates in unit activities  Description: Interventions:  - Provide therapeutic environment   - Provide required programming   - Redirect inappropriate behaviors   Outcome: Progressing  Goal: Patient/Family participate in treatment and DC plans  Description: Interventions:  - Provide therapeutic environment  Outcome: Progressing     Problem: Depression  Goal: Treatment Goal: Demonstrate behavioral control of depressive symptoms, verbalize feelings of improved mood/affect, and adopt new coping skills prior to discharge  Outcome: Progressing  Goal: Refrain from harming self  Description: Interventions:  - Monitor patient closely, per order   - Supervise medication ingestion, monitor effects and side effects   Outcome: Progressing  Goal: Refrain from isolation  Description: Interventions:  - Develop a trusting relationship   - Encourage socialization   Outcome: Progressing  Goal: Refrain from self-neglect  Outcome: Progressing     Problem: Anxiety  Goal: Anxiety is at manageable level  Description: Interventions:  - Assess and monitor patient's anxiety level.   - Monitor for signs and symptoms (heart palpitations, chest pain, shortness of breath, headaches, nausea, feeling jumpy, restlessness, irritable, apprehensive).   - Collaborate with interdisciplinary team and initiate plan and interventions as ordered.  - [place] patient to unit/surroundings  - Explain treatment plan  - Encourage participation in care  - Encourage verbalization of concerns/fears  - Identify coping mechanisms  - Assist in developing anxiety-reducing skills  - Administer/offer alternative therapies  - Limit or eliminate stimulants  Outcome: Progressing

## 2024-05-02 NOTE — PROGRESS NOTES
"Progress Note - Behavioral Health   Alberto Berumen 28 y.o. male MRN: [id number]  Unit/Bed#: [facility] 101-02 Encounter: [id number]    Assessment/Plan   Principal Problem:    Schizoaffective disorder, bipolar type (HCC)  Active Problems:    GERD (gastroesophageal reflux disease)    Medical clearance for psychiatric admission    Tobacco abuse    T wave inversion in EKG    Chronic idiopathic constipation    Confluent and reticulate papillomatosis    Primary hypertension    Elevated hemoglobin A1c      Behavior over the last 24 hours:  improved  Sleep: normal  Appetite: normal  Medication side effects: No  ROS:  abdominal cramping relieved by BM; no other complaints    Subjective: Patient is a 28 year old male, with schizoaffective disorder bipolar type. He attended 5/9 groups yesterday. The voices in his head are constantly present, and threaten to kill him and his family. He notes that he feels paranoid of the voices, but notes that he feels he is able to get out of his head more easily. He is less isolative, and is more present on the unit with peers. Staffing notes that he has been pleasant and cooperative. ECT is scheduled for tomorrow 5/3/24. Found patient on the unit lounging and having a pleasant conversation with a peer. He is bright on approach, remembered the name of this writer, and he was singing along to the radio music. He states that he slept well without nightmares or dreams; notes \"sleep went too fast.\" He states that he is present on the unit today because he is attempting to achieve enough credit to receive his hair oils.     Assessment: Patient is improving with use of ECT; although he remains to have auditory hallucinations constantly.     Mental Status Evaluation:  Appearance:  age appropriate and casually dressed. He is well-groomed, and is wearing hospital attire -- a grey patient gown with teal loose pants and a black sweatshirt with his restrepo up. He has black hair.    Behavior:  psychomotor " "retardation. His movements are slow, but he is cooperative, pleasant, calm, joyful, makes good eye contact.    Speech:  increased latency of response. Normal volume, normal tone, normal pitch. No speech impediments    Mood:  \"Stable\"   Affect:  mood-congruent. Stable affect, affect is appropriate to thought content, normal range of affect   Thought Process:  goal directed and logical   Associations: intact associations   Thought Content:  delusions  persecutory. He expresses paranoia of the auditory hallucinations. Denies any experiences leading to grandiose, somatic, or bizarre delusions. He denies SI, HI, obsessions, compulsions, or distorted body perceptions. He does not appear to be hypervigilant   Perceptual Disturbances: Auditory hallucinations without commands. There are multiple voices that tell him they will kill him and his family. Denies tactile, gustatory, visual, or olfactory hallucinations. Denies illusions, feelings of depersonalization or derealization. Does not appear to be responding to internal stimuli.    Risk Potential: Suicidal Ideations none  Homicidal Ideations none  Potential for Aggression No   Sensorium:  person, place, time/date, situation, and day of week   Memory:  recent memory intact, remote memory intact, immediate memory intact  Immediate memory: able to repeat 3 unrelated words (brown, honesty, tulip)  Recent memory: able to relay who the current president is; able to recall what he had for dinner last night (chicken); able to recall the 3 unrelated words with some guidance (brown, tulip, honesty)  Remote memory: able to relay where he went to middle school   Consciousness:  alert    Attention: attention span and concentration were age appropriate  Attention: able to repeat a phone number without error  Concentration: able to perform serial 7's to 79    Intelligence: Fund of general knowledge: aware of how many states in the [place]  Calculation ability: apply to multiple 2x6;2x12;2x24 " correctly  Abstract interpretation: able to relay similarity between pen and paintbrush (both have ink)  Problem solving: able to express why the moon appears larger than the stars   Impulse control: He does not appear to have poor impulse control   Insight:  age appropriate; he is aware of his diagnoses and he expresses understanding of treatment need   Judgment: age appropriate; he expresses he'd mail out an envelope that fell off of a mail truck  Social judgment: he is dressed appropriately and he is acting appropriately   Gait/Station: normal gait/station and slow   Motor Activity: no abnormal movements     Progress Toward Goals: He shows improvement in managing auditory hallucinations, he expresses benefit of receiving ECT and looks forward to ECT tomorrow. He is less isolative, and more present on the unit. He expresses understanding need for group therapy.     Discharge plan: Plan is to discharge to his aunt's house and to have an ACT team in place to ensure medications are taken and to support him in the community.    Recommended Treatment: Continue with group therapy, milieu therapy and occupational therapy.      Risks, benefits and possible side effects of Medications:   Risks, benefits, and possible side effects of medications explained to patient and patient verbalizes understanding.      Medications: all current active meds have been reviewed.    Labs: I have personally reviewed all pertinent laboratory/tests results.     Counseling / Coordination of Care  Total floor / unit time spent today 30 minutes. Greater than 50% of total time was spent with the patient and / or family counseling and / or coordination of care.

## 2024-05-02 NOTE — NURSING NOTE
This RN arrived at 1900.           Patient visible on unit most of shift. Social with female peer. M.O. Offers no current complaints or suicidal ideations. Medication compliant. Continues to have ECT treatments. Admits feeling less depressed. Appetite excellent. Attends groups. Pleasant and cooperative.

## 2024-05-02 NOTE — NURSING NOTE
Alanna Mccarthy is a 77 y.o. female (: 1945) presenting to address:    Chief Complaint   Patient presents with   [address] [facility] Patient     Gallbladder       Medication list and allergies have been reviewed with Alannablair Mccarthy and updated as of today's date. I have gone over all Medical, Surgical and Social History with Alannablair Mccarthy and updated/added the information accordingly. Patient is visible and social on the unit. He states he still has auditory hallucinations. Patient states they come and go. Patient is medication and meal compliant. Patient had a good visit with is aunt this afternoon. Patient attended groups.

## 2024-05-03 ENCOUNTER — ANESTHESIA EVENT (INPATIENT)
Dept: PREOP/PACU | Facility: HOSPITAL | Age: 29
DRG: 750 | End: 2024-05-03
Payer: COMMERCIAL

## 2024-05-03 ENCOUNTER — ANESTHESIA (INPATIENT)
Dept: PREOP/PACU | Facility: HOSPITAL | Age: 29
DRG: 750 | End: 2024-05-03
Payer: COMMERCIAL

## 2024-05-03 ENCOUNTER — APPOINTMENT (INPATIENT)
Dept: PREOP/PACU | Facility: HOSPITAL | Age: 29
DRG: 750 | End: 2024-05-03
Attending: PSYCHIATRY & NEUROLOGY
Payer: COMMERCIAL

## 2024-05-03 PROCEDURE — 99232 SBSQ HOSP IP/OBS MODERATE 35: CPT | Performed by: PSYCHIATRY & NEUROLOGY

## 2024-05-03 PROCEDURE — 90870 ELECTROCONVULSIVE THERAPY: CPT

## 2024-05-03 PROCEDURE — GZB2ZZZ ELECTROCONVULSIVE THERAPY, BILATERAL-SINGLE SEIZURE: ICD-10-PCS | Performed by: STUDENT IN AN ORGANIZED HEALTH CARE EDUCATION/TRAINING PROGRAM

## 2024-05-03 PROCEDURE — 90870 ELECTROCONVULSIVE THERAPY: CPT | Performed by: STUDENT IN AN ORGANIZED HEALTH CARE EDUCATION/TRAINING PROGRAM

## 2024-05-03 RX ORDER — SUCCINYLCHOLINE/SOD CL,ISO/PF 100 MG/5ML
SYRINGE (ML) INTRAVENOUS AS NEEDED
Status: DISCONTINUED | OUTPATIENT
Start: 2024-05-03 | End: 2024-05-03

## 2024-05-03 RX ORDER — ETOMIDATE 2 MG/ML
INJECTION INTRAVENOUS AS NEEDED
Status: DISCONTINUED | OUTPATIENT
Start: 2024-05-03 | End: 2024-05-03

## 2024-05-03 RX ORDER — GLYCOPYRROLATE 0.2 MG/ML
INJECTION INTRAMUSCULAR; INTRAVENOUS AS NEEDED
Status: DISCONTINUED | OUTPATIENT
Start: 2024-05-03 | End: 2024-05-03

## 2024-05-03 RX ORDER — KETOROLAC TROMETHAMINE 30 MG/ML
INJECTION, SOLUTION INTRAMUSCULAR; INTRAVENOUS AS NEEDED
Status: DISCONTINUED | OUTPATIENT
Start: 2024-05-03 | End: 2024-05-03

## 2024-05-03 RX ORDER — LABETALOL HYDROCHLORIDE 5 MG/ML
INJECTION, SOLUTION INTRAVENOUS AS NEEDED
Status: DISCONTINUED | OUTPATIENT
Start: 2024-05-03 | End: 2024-05-03

## 2024-05-03 RX ORDER — HYDRALAZINE HYDROCHLORIDE 20 MG/ML
INJECTION INTRAMUSCULAR; INTRAVENOUS AS NEEDED
Status: DISCONTINUED | OUTPATIENT
Start: 2024-05-03 | End: 2024-05-03

## 2024-05-03 RX ORDER — SODIUM CHLORIDE, SODIUM LACTATE, POTASSIUM CHLORIDE, CALCIUM CHLORIDE 600; 310; 30; 20 MG/100ML; MG/100ML; MG/100ML; MG/100ML
INJECTION, SOLUTION INTRAVENOUS CONTINUOUS PRN
Status: DISCONTINUED | OUTPATIENT
Start: 2024-05-03 | End: 2024-05-03

## 2024-05-03 RX ADMIN — ETOMIDATE INJECTION 20 MG: 2 SOLUTION INTRAVENOUS at 07:08

## 2024-05-03 RX ADMIN — SENNOSIDES AND DOCUSATE SODIUM 2 TABLET: 8.6; 5 TABLET ORAL at 08:21

## 2024-05-03 RX ADMIN — Medication 140 MG: at 07:08

## 2024-05-03 RX ADMIN — SODIUM CHLORIDE, SODIUM LACTATE, POTASSIUM CHLORIDE, AND CALCIUM CHLORIDE: .6; .31; .03; .02 INJECTION, SOLUTION INTRAVENOUS at 07:29

## 2024-05-03 RX ADMIN — NICOTINE POLACRILEX 2 MG: 2 GUM, CHEWING ORAL at 17:05

## 2024-05-03 RX ADMIN — PROPRANOLOL HYDROCHLORIDE 10 MG: 10 TABLET ORAL at 05:32

## 2024-05-03 RX ADMIN — LABETALOL 20 MG/4 ML (5 MG/ML) INTRAVENOUS SYRINGE 10 MG: at 07:27

## 2024-05-03 RX ADMIN — GLYCOPYRROLATE 0.2 MG: 0.2 INJECTION INTRAMUSCULAR; INTRAVENOUS at 07:01

## 2024-05-03 RX ADMIN — MELATONIN TAB 3 MG 3 MG: 3 TAB at 21:19

## 2024-05-03 RX ADMIN — CYANOCOBALAMIN TAB 1000 MCG 1000 MCG: 1000 TAB at 08:22

## 2024-05-03 RX ADMIN — CLOZAPINE 500 MG: 100 TABLET ORAL at 21:18

## 2024-05-03 RX ADMIN — ESCITALOPRAM OXALATE 20 MG: 10 TABLET, FILM COATED ORAL at 08:22

## 2024-05-03 RX ADMIN — POLYETHYLENE GLYCOL 3350 17 G: 17 POWDER, FOR SOLUTION ORAL at 08:21

## 2024-05-03 RX ADMIN — SENNOSIDES AND DOCUSATE SODIUM 2 TABLET: 8.6; 5 TABLET ORAL at 17:05

## 2024-05-03 RX ADMIN — KETOROLAC TROMETHAMINE 30 MG: 30 INJECTION, SOLUTION INTRAMUSCULAR; INTRAVENOUS at 07:23

## 2024-05-03 RX ADMIN — METFORMIN HYDROCHLORIDE 500 MG: 500 TABLET ORAL at 08:22

## 2024-05-03 RX ADMIN — RISPERIDONE 2 MG: 2 TABLET, FILM COATED ORAL at 21:19

## 2024-05-03 RX ADMIN — HYDRALAZINE HYDROCHLORIDE 10 MG: 20 INJECTION INTRAMUSCULAR; INTRAVENOUS at 07:12

## 2024-05-03 RX ADMIN — PROPRANOLOL HYDROCHLORIDE 10 MG: 10 TABLET ORAL at 21:19

## 2024-05-03 RX ADMIN — SODIUM CHLORIDE, SODIUM LACTATE, POTASSIUM CHLORIDE, AND CALCIUM CHLORIDE: .6; .31; .03; .02 INJECTION, SOLUTION INTRAVENOUS at 06:54

## 2024-05-03 RX ADMIN — NICOTINE POLACRILEX 2 MG: 2 GUM, CHEWING ORAL at 21:19

## 2024-05-03 NOTE — PLAN OF CARE
Problem: Ineffective Coping  Goal: Identifies ineffective coping skills  Outcome: Progressing  Goal: Identifies healthy coping skills  Outcome: Progressing  Goal: Demonstrates healthy coping skills  Outcome: Progressing  Goal: Participates in unit activities  Description: Interventions:  - Provide therapeutic environment   - Provide required programming   - Redirect inappropriate behaviors   Outcome: Progressing   Alberto attended 12/16 offered during the past 2 days.

## 2024-05-03 NOTE — PROGRESS NOTES
"Progress Note - Behavioral Health   Alberto Berumen 28 y.o. male MRN: [id number]  Unit/Bed#: [facility] 101-02 Encounter: [id number]    Assessment/Plan   Principal Problem:    Schizoaffective disorder, bipolar type ([facility])  Active Problems:    GERD (gastroesophageal reflux disease)    Medical clearance for psychiatric admission    Tobacco abuse    T wave inversion in EKG    Chronic idiopathic constipation    Confluent and reticulate papillomatosis    Primary hypertension    Elevated hemoglobin A1c      Behavior over the last 24 hours:  improved  Sleep: normal  Appetite: normal  Medication side effects: No  ROS: no complaints    Subjective: Patient is a 28 year old male, with schizoaffective disorder, bipolar type. He attended 7/7 groups yesterday. He is less isolative, pleasant and more present on the unit with peers. He had a visit with his aunt yesterday, and he expressed happiness to see her -- \"it was really good to see her.\" He had ECT this morning, and expresses it went well. Nursing notes that he was tachy at 104 last night, and 108 on recheck. This morning, his vital signs are WNL. He is medication-compliant. Nursing denies any behavioral issues yesterday. He states that ECT was draining today, though he notes that the voices are less intrusive, and he is better able to \"get out of his head.\" Denies nightmares, states he slept well though he is tired today from ECT. Nursing notes he had no behavioral issues yesterday.    Assessment: Pt is improving with use of ECT, and continuing medications. The auditory hallucinations remain to be constant, but he feels he is better able to manage with them.     Mental Status Evaluation:  Appearance:  age appropriate and casually dressed. He is wearing hospital attire -- grey patient gown with teal scrub pants and light blue crocs. He has black hair in dreads. He appears well-groomed.   Behavior:  Cooperative, pleasant, makes good eye contact. Is fatigued today.    Speech:  " "increased latency of response, normal volume, normal tone, normal pitch. No speech impediment   Mood:  \"Stable\"   Affect:  mood-congruent, blunted affect. Appropriate affect to thought content   Thought Process:  goal directed and logical   Associations: intact associations   Thought Content:  delusions  persecutory; he notes that he feels paranoid about the auditory hallucinations. Denies any experiences leading to grandiose, somatic, or bizarre delusions, Denies phobias, obsessions, compulsions, or distorted body perceptions. Denies SI, HI. Does not appear to be hypervigilant or preoccupied with violence.    Perceptual Disturbances: Auditory hallucinations without commands. He express that there are multiple voices in his head telling him that they will kill him and his family. Denies tactile, gustatory, visual, or olfactory hallucinations. Denies feelings of depersonalization, derealization, or illusions. Does not appear to be responding to internal stimuli.    Risk Potential: Suicidal Ideations none  Homicidal Ideations none  Potential for Aggression No   Sensorium:  person, place, time/date, situation, and day of week   Memory:  recent memory intact, remote memory intact, immediate memory intact  Immediate memory: able to recount the 3 unrelated words (pink, envy, chair)  Recent memory: able to express how many [place] states there are; able to relay what he had for dinner yesterday; able to recall the 3 unrelated words with some guidance  Remote memory: able to recall the past 3 [place] presidents   Consciousness:  alert    Attention: attention span and concentration were age appropriate  Attention: able to repeat a phone number  Concentration: able to spell \"reach\" backwards and forwards   Intelligence:  Fund of knowledge: unaware of current events  Calculation ability: able to perform simple multiplication of 7x2;7x3;7x4  Abstract interpretation: able to relay similarities between banana and kiwi   Insight:  age " "appropriate; he expresses awareness of his diagnosis and the need for treatment   Judgment: age appropriate; he expresses appropriate judgment to a person yelling \"fire\" in a shopping mall   Social judgment: he is dressed appropriately and acts appropriately   Impulse control: He does not appear to have poor impulse control   Gait/Station: normal gait/station and slow   Motor Activity: no abnormal movements     Progress Toward Goals: Patient is making improvements towards his goals; he understands the importance of attending group therapy and is cooperative during some groups. He reports that ECT is helpful toward the auditory hallucinations, though he remains to have them constantly.     Discharge plan: Plan is to discharge to his aunt's home with ACT established to ensure he is managing his medications properly and to assist him function in the community.    Recommended Treatment: Continue with group therapy, milieu therapy and occupational therapy.      Risks, benefits and possible side effects of Medications:   Risks, benefits, and possible side effects of medications explained to patient and patient verbalizes understanding.      Medications: all current active meds have been reviewed.    Labs: I have personally reviewed all pertinent laboratory/tests results.     Counseling / Coordination of Care  Total floor / unit time spent today 30 minutes. Greater than 50% of total time was spent with the patient and / or family counseling and / or coordination of care.  "

## 2024-05-03 NOTE — PROGRESS NOTES
05/03/24 0728   Team Meeting   Meeting Type Daily Rounds   Team Members Present   Team Members Present Physician;Nurse;;Other (Discipline and Name)   Patient/Family Present   Patient Present No   Patient's Family Present No     In attendance:  Dr. Jordan Holter, DO Jessica Stives, PA-C Daniel Teles, RN  Luisana Alvarado, [facility]  Carla Lux, W  MATILDA Daniel.S.    Groups: 7/7    Pt had in person visit with his aunt that went well. Pt had ECT this morning. Pt is pleasant with no behavioral issues. Pt reports ongoing AH but is more visible, interactive and engaged.

## 2024-05-03 NOTE — NURSING NOTE
Patient had ECT today, tolerated it well but does sleep more on ECT days. Pt was encouraged to get up for breakfast, he did come out and eat 50%. Pt took medications without issue, reports no s/s.

## 2024-05-03 NOTE — NURSING NOTE
Pt is polite and cooperative. Visible in the milieu and social with peers. Refused HS atropine drops. Pt's pulse elevated at HS it was 108. Manual recheck 107. Pt showered and prepared for ECT tomorrow morning. Nicorette gum given 2111. No behavioral issues.

## 2024-05-03 NOTE — ANESTHESIA PREPROCEDURE EVALUATION
Procedure:  ELECTROCONVULSIVE THERAPY (ECT)    Relevant Problems   CARDIO   (+) Primary hypertension      GI/HEPATIC   (+) GERD (gastroesophageal reflux disease)        Physical Exam    Airway    Mallampati score: II  TM Distance: >3 FB  Neck ROM: full     Dental       Cardiovascular  Cardiovascular exam normal    Pulmonary  Pulmonary exam normal     Other Findings        Anesthesia Plan  ASA Score- 2     Anesthesia Type- general with ASA Monitors.         Additional Monitors:     Airway Plan:            Plan Factors-    Chart reviewed.   Existing labs reviewed.                   Induction- intravenous.    Postoperative Plan-     Informed Consent- Anesthetic plan and risks discussed with patient.  I personally reviewed this patient with the CRNA. Discussed and agreed on the Anesthesia Plan with the CRNA..

## 2024-05-03 NOTE — ANESTHESIA POSTPROCEDURE EVALUATION
Post-Op Assessment Note    CV Status:  Stable  Pain Score: 0    Pain management: adequate       Mental Status:  Alert and awake   Hydration Status:  Euvolemic   PONV Controlled:  Controlled   Airway Patency:  Patent     Post Op Vitals Reviewed: Yes    No anethesia notable event occurred.    Staff: CRNA               BP   145/66   Temp   98   Pulse  95   Resp   16   SpO2   99

## 2024-05-03 NOTE — PROCEDURES
"Procedure Note - ECT  Alberto Berumen 28 y.o. male MRN: [id number]    Time out was taken with staff to confirm correct patient and correct procedure to be performed.  Patient reports that he has been doing without well with procedures.  Not experiencing significant side effects other than some short-term memory issues that occur briefly after the procedure and then improve as the day goes on.  He reports that he continues to hear auditory hallucinations although states that he is \"not as much in my head\".  Agrees to proceed.    Session Number: Maintenance    Diagnosis: Principal Problem:    Schizoaffective disorder, bipolar type ([facility])  Active Problems:    GERD (gastroesophageal reflux disease)    Medical clearance for psychiatric admission    Tobacco abuse    T wave inversion in EKG    Chronic idiopathic constipation    Confluent and reticulate papillomatosis    Primary hypertension    Elevated hemoglobin A1c      ECT Type: Maintenance    Anesthesia: Etomidate :    Electrode Placement: Bilateral    Energy level:  100 %      Seizure Duration     EE Sec.    EMG : 32 Sec    Post-ictal Suppression Index: 89.8 %    Results:Clinical seizure was satisfactory, Patient tolerated ECT well    Vitals:    24 0750   BP: 140/71   Pulse: 93   Resp: 17   Temp:    SpO2: 96%        Medication Administration - last 24 hours from 2024 0834 to 2024 0834         Date/Time Order Dose Route Action Action by     2024 0514 EDT amLODIPine (NORVASC) tablet 5 mg 0 mg Oral Hold Arin Travis RN     2024 EDT atropine (ISOPTO ATROPINE) 1 % ophthalmic solution 1 drop 1 drop Sublingual Not Given Mare Berry RN     2024 0822 EDT escitalopram (LEXAPRO) tablet 20 mg 20 mg Oral Given Guy Taylor     2024 0822 EDT metFORMIN (GLUCOPHAGE) tablet 500 mg 500 mg Oral Given Guy Taylor     2024 0821 EDT polyethylene glycol (MIRALAX) packet 17 g 17 g Oral Given Guy Taylor     2024 0822 EDT " propranolol (INDERAL) tablet 10 mg 10 mg Oral Not Given Guy Taylor     05/03/2024 0532 EDT propranolol (INDERAL) tablet 10 mg 10 mg Oral Given Arin Travsi RN     05/02/2024 2111 EDT propranolol (INDERAL) tablet 10 mg 10 mg Oral Given Mare Berry RN     05/03/2024 0821 EDT senna-docusate sodium (SENOKOT S) 8.6-50 mg per tablet 2 tablet 2 tablet Oral Given Guy Taylor     05/02/2024 1706 EDT senna-docusate sodium (SENOKOT S) 8.6-50 mg per tablet 2 tablet 2 tablet Oral Given Linsey Gibbons RN     05/02/2024 2111 EDT melatonin tablet 3 mg 3 mg Oral Given Mare Berry RN     05/03/2024 0822 EDT cyanocobalamin (VITAMIN B-12) tablet 1,000 mcg 1,000 mcg Oral Given Guy Taylor     05/02/2024 2111 EDT risperiDONE (RisperDAL) tablet 2 mg 2 mg Oral Given Mare Berry RN     05/02/2024 2111 EDT cloZAPine (CLOZARIL) tablet 500 mg 500 mg Oral Given Mare Berry RN     05/02/2024 2111 EDT nicotine polacrilex (NICORETTE) gum 2 mg 2 mg Oral Given Mare Berry RN     05/02/2024 1706 EDT nicotine polacrilex (NICORETTE) gum 2 mg 2 mg Oral Given Linsey Gibbons RN     05/02/2024 1248 EDT nicotine polacrilex (NICORETTE) gum 2 mg 2 mg Oral Given Britni Lucas RN

## 2024-05-03 NOTE — NURSING NOTE
Pt is intermittently visible on the unit but observed napping throughout the evening. Consumed 75% of dinner. Took medications without incidence but refused atropine drops. Pt is pleasant and cooperative. Attended minimal groups today. Denies all psych symptoms but reports feeling very tired after ECT. Pt is disheveled. No behavioral issues. VSS. Continuous safety checks maintained.

## 2024-05-03 NOTE — PLAN OF CARE
Problem: Alteration in Thoughts and Perception  Goal: Treatment Goal: Gain control of psychotic behaviors/thinking, reduce/eliminate presenting symptoms and demonstrate improved reality functioning upon discharge  Outcome: Progressing  Goal: Verbalize thoughts and feelings  Description: Interventions:  - Promote a nonjudgmental and trusting relationship with the patient through active listening and therapeutic communication  - Assess patient's level of functioning, behavior and potential for risk  - Engage patient in 1 on 1 interactions  - Encourage patient to express fears, feelings, frustrations, and discuss symptoms    - [place] patient to reality, help patient recognize reality-based thinking   - Administer medications as ordered and assess for potential side effects  - Provide the patient education related to the signs and symptoms of the illness and desired effects of prescribed medications  Outcome: Progressing  Goal: Refrain from acting on delusional thinking/internal stimuli  Description: Interventions:  - Monitor patient closely, per order   - Utilize least restrictive measures   - Set reasonable limits, give positive feedback for acceptable   - Administer medications as ordered and monitor of potential side effects  Outcome: Progressing  Goal: Agree to be compliant with medication regime, as prescribed and report medication side effects  Description: Interventions:  - Offer appropriate PRN medication and supervise ingestion; conduct AIMS, as needed   Outcome: Progressing  Goal: Attend and participate in unit activities, including therapeutic, recreational, and educational groups  Description: Interventions:  -Encourage Visitation and family involvement in care  Outcome: Progressing  Goal: Recognize dysfunctional thoughts, communicate reality-based thoughts at the time of discharge  Description: Interventions:  - Provide medication and psycho-education to assist patient in compliance and developing  insight into his/her illness   Outcome: Progressing  Goal: Complete daily ADLs, including personal hygiene independently, as able  Description: Interventions:  - Observe, teach, and assist patient with ADLS  - Monitor and promote a balance of rest/activity, with adequate nutrition and elimination   Outcome: Progressing     Problem: Ineffective Coping  Goal: Identifies ineffective coping skills  Outcome: Progressing  Goal: Identifies healthy coping skills  Outcome: Progressing  Goal: Demonstrates healthy coping skills  Outcome: Progressing  Goal: Participates in unit activities  Description: Interventions:  - Provide therapeutic environment   - Provide required programming   - Redirect inappropriate behaviors   Outcome: Progressing     Problem: Depression  Goal: Treatment Goal: Demonstrate behavioral control of depressive symptoms, verbalize feelings of improved mood/affect, and adopt new coping skills prior to discharge  Outcome: Progressing  Goal: Verbalize thoughts and feelings  Description: Interventions:  - Assess and re-assess patient's level of risk   - Engage patient in 1:1 interactions, daily, for a minimum of 15 minutes   - Encourage patient to express feelings, fears, frustrations, hopes   Outcome: Progressing  Goal: Refrain from harming self  Description: Interventions:  - Monitor patient closely, per order   - Supervise medication ingestion, monitor effects and side effects   Outcome: Progressing  Goal: Refrain from isolation  Description: Interventions:  - Develop a trusting relationship   - Encourage socialization   Outcome: Progressing  Goal: Refrain from self-neglect  Outcome: Progressing  Goal: Attend and participate in unit activities, including therapeutic, recreational, and educational groups  Description: Interventions:  - Provide therapeutic and educational activities daily, encourage attendance and participation, and document same in the medical record   Outcome: Progressing  Goal: Complete  daily ADLs, including personal hygiene independently, as able  Description: Interventions:  - Observe, teach, and assist patient with ADLS  -  Monitor and promote a balance of rest/activity, with adequate nutrition and elimination   Outcome: Progressing     Problem: Anxiety  Goal: Anxiety is at manageable level  Description: Interventions:  - Assess and monitor patient's anxiety level.   - Monitor for signs and symptoms (heart palpitations, chest pain, shortness of breath, headaches, nausea, feeling jumpy, restlessness, irritable, apprehensive).   - Collaborate with interdisciplinary team and initiate plan and interventions as ordered.  - [place] patient to unit/surroundings  - Explain treatment plan  - Encourage participation in care  - Encourage verbalization of concerns/fears  - Identify coping mechanisms  - Assist in developing anxiety-reducing skills  - Administer/offer alternative therapies  - Limit or eliminate stimulants  Outcome: Progressing     Problem: Alteration in Orientation  Goal: Treatment Goal: Demonstrate a reduction of confusion and improved orientation to person, place, time and/or situation upon discharge, according to optimum baseline/ability  Outcome: Progressing  Goal: Interact with staff daily  Description: Interventions:  - Assess and re-assess patient's level of orientation  - Engage patient in 1 on 1 interactions, daily, for a minimum of 15 minutes   - Establish rapport/trust with patient   Outcome: Progressing  Goal: Cooperate with recommended testing/procedures  Description: Interventions:  - Determine need for ancillary testing  - Observe for mental status changes  - Implement falls/precaution protocol   Outcome: Progressing  Goal: Attend and participate in unit activities, including therapeutic, recreational, and educational groups  Description: Interventions:  - Provide therapeutic and educational activities daily, encourage attendance and participation, and document same in the  medical record   - Provide appropriate opportunities for reminiscence   - Provide a consistent daily routine   - Encourage family contact/visitation   Outcome: Progressing  Goal: Complete daily ADLs, including personal hygiene independently, as able  Description: Interventions:  - Observe, teach, and assist patient with ADLS  Outcome: Progressing

## 2024-05-04 PROCEDURE — 99232 SBSQ HOSP IP/OBS MODERATE 35: CPT | Performed by: STUDENT IN AN ORGANIZED HEALTH CARE EDUCATION/TRAINING PROGRAM

## 2024-05-04 RX ADMIN — SENNOSIDES AND DOCUSATE SODIUM 2 TABLET: 8.6; 5 TABLET ORAL at 08:27

## 2024-05-04 RX ADMIN — ESCITALOPRAM OXALATE 20 MG: 10 TABLET, FILM COATED ORAL at 08:27

## 2024-05-04 RX ADMIN — POLYETHYLENE GLYCOL 3350 17 G: 17 POWDER, FOR SOLUTION ORAL at 08:26

## 2024-05-04 RX ADMIN — AMLODIPINE BESYLATE 5 MG: 5 TABLET ORAL at 08:27

## 2024-05-04 RX ADMIN — NICOTINE POLACRILEX 2 MG: 2 GUM, CHEWING ORAL at 12:53

## 2024-05-04 RX ADMIN — CYANOCOBALAMIN TAB 1000 MCG 1000 MCG: 1000 TAB at 08:27

## 2024-05-04 RX ADMIN — PROPRANOLOL HYDROCHLORIDE 10 MG: 10 TABLET ORAL at 21:17

## 2024-05-04 RX ADMIN — METFORMIN HYDROCHLORIDE 500 MG: 500 TABLET ORAL at 08:27

## 2024-05-04 RX ADMIN — MELATONIN TAB 3 MG 3 MG: 3 TAB at 21:18

## 2024-05-04 RX ADMIN — NICOTINE POLACRILEX 2 MG: 2 GUM, CHEWING ORAL at 17:28

## 2024-05-04 RX ADMIN — SENNOSIDES AND DOCUSATE SODIUM 2 TABLET: 8.6; 5 TABLET ORAL at 17:10

## 2024-05-04 RX ADMIN — CLOZAPINE 500 MG: 100 TABLET ORAL at 21:18

## 2024-05-04 RX ADMIN — PROPRANOLOL HYDROCHLORIDE 10 MG: 10 TABLET ORAL at 08:27

## 2024-05-04 RX ADMIN — RISPERIDONE 2 MG: 2 TABLET, FILM COATED ORAL at 21:18

## 2024-05-04 RX ADMIN — NICOTINE POLACRILEX 2 MG: 2 GUM, CHEWING ORAL at 08:27

## 2024-05-04 NOTE — PROGRESS NOTES
Progress Note - Behavioral Health     Alberto Berumen 28 y.o. male MRN: [id number]   Unit/Bed#: [facility] 101-02 Encounter: [id number]    Documentation, nursing notes, medication reconciliation, labs, and vitals reviewed. Patient was seen for continuing care and reviewed with treatment team. No acute events over the past 24 hours.  Per nursing report, Alberto napped on and off yesterday, intermittently visible, medication compliant, pleasant, attended some groups, no behavioral concerns.     No scheduled medication changes over the past 24 hours. Continues to tolerate current medications with no adverse effects. On evaluation today, Alberto is observed in milieu throughout morning. He is cooperative with evaluation. He reports feeling mildly anxious. He denies depressed mood. He reports ongoing AH telling him they are going to kill him and his family. He tries to ignore the voices. No VH. He denies sleep/appetite disturbance or medication side effects.     Psychiatric ROS:  Behavior over the last 24 hours: unchanged  Sleep: normal  Appetite: normal  Medication side effects: No   ROS: no complaints, all other systems are negative    Mental Status Evaluation:    Appearance:  wearing hospital clothes, looks stated age   Behavior:  cooperative, poor eye contact   Speech:  scant, delayed, soft   Mood:  dysphoric   Affect:  flat   Thought Process:  coherent, goal directed   Associations: intact associations   Thought Content:  some paranoia   Perceptual Disturbances: auditory hallucinations   Risk Potential: Suicidal ideation - None  Homicidal ideation - None  Potential for aggression - No   Sensorium:  oriented to person, place, time/date, and situation   Memory:  recent and remote memory grossly intact   Consciousness:  alert and awake   Attention/Concentration: attention span and concentration appear shorter than expected for age   Insight:  limited   Judgment: limited   Gait/Station: normal gait/station, normal balance    Motor Activity: no abnormal movements     Vital signs in last 24 hours:    Temp:  [97.8 °F (36.6 °C)-98.3 °F (36.8 °C)] 97.8 °F (36.6 °C)  HR:  [] 100  Resp:  [18] 18  BP: (116-146)/(63-85) 124/83    Laboratory results: I have personally reviewed all pertinent laboratory/tests results    Results from the past 24 hours: No results found for this or any previous visit (from the past 24 hour(s)).  Most Recent Labs:   Lab Results   Component Value Date    WBC 9.06 05/02/2024    RBC 5.02 05/02/2024    HGB 11.8 (L) 05/02/2024    HCT 40.6 05/02/2024     05/02/2024    RDW 14.7 05/02/2024    NEUTROABS 4.73 05/02/2024    SODIUM 139 04/01/2024    K 4.1 04/01/2024     04/01/2024    CO2 27 04/01/2024    BUN 12 04/01/2024    CREATININE 0.90 04/01/2024    GLUC 120 04/01/2024    CALCIUM 9.3 04/01/2024    AST 23 04/01/2024    ALT 53 (H) 04/01/2024    ALKPHOS 84 04/01/2024    TP 7.2 04/01/2024    ALB 4.0 04/01/2024    TBILI 0.23 04/01/2024    CHOLESTEROL 156 03/14/2024    HDL 44 03/14/2024    TRIG 133 03/14/2024    LDLCALC 85 03/14/2024    NONHDLC 112 03/14/2024    LITHIUM 0.61 01/09/2024    [id number] 1.062 11/15/2023    SYPHILISAB Non-reactive 11/18/2023    HGBA1C 5.0 04/01/2024    EAG 97 04/01/2024       Suicide/Homicide Risk Assessment:    Risk of Harm to Self:   Nursing Suicide Risk Assessment Last 24 hours: C-SSRS Risk (Since Last Contact)  Calculated C-SSRS Risk Score (Since Last Contact): No Risk Indicated  Current Specific Risk Factors include: diagnosis of mood disorder, mental illness diagnosis, current psychotic symptoms  Protective Factors: no current suicidal ideation, ability to communicate with staff on the unit, taking medications as ordered on the unit  Based on today's assessment, Alberto presents the following risk of harm to self: low    Risk of Harm to Others:  Nursing Homicide Risk Assessment: Violence Risk to Others: Denies within past 6 months  Current Specific Risk Factors include:  diagnosis of mood disorder, current psychotic symptoms  Protective Factors: no current homicidal ideation, compliant with medications on the unit as ordered, compliant with unit milieu  Based on today's assessment, Alberto presents the following risk of harm to others: low    The following interventions are recommended: behavioral checks every 7 minutes, continued hospitalization on locked unit    Progress Toward Goals: progressing    Assessment/Plan   Principal Problem:    Schizoaffective disorder, bipolar type (HCC)  Active Problems:    GERD (gastroesophageal reflux disease)    Medical clearance for psychiatric admission    Tobacco abuse    T wave inversion in EKG    Chronic idiopathic constipation    Confluent and reticulate papillomatosis    Primary hypertension    Elevated hemoglobin A1c      Recommended Treatment:     Planned medication and treatment changes:    All current active medications have been reviewed  Encourage group therapy, milieu therapy and occupational therapy  Behavioral Health checks every 7 minutes  Monitor weekly BNP, CRP, CBC with diff, CK, and clozapine level  Continue current medications:    Current Facility-Administered Medications   Medication Dose Route Frequency Provider Last Rate    acetaminophen  650 mg Oral Q4H PRN Jordan C Holter,       acetaminophen  650 mg Oral Q6H PRN HOLLI Lion      aluminum-magnesium hydroxide-simethicone  30 mL Oral Q4H PRN Jordan C Holter, DO      amLODIPine  5 mg Oral Daily HOLLI Lion      Artificial Tears  1 drop Both Eyes Q3H PRN Jordan C Holter, DO      atropine  1 drop Sublingual HS HOLLI Lion      atropine  1 drop Sublingual Daily PRN HOLLI Lion      haloperidol lactate  2.5 mg Intramuscular Q4H PRN Max 4/day HOLLI Lion      And    LORazepam  1 mg Intramuscular Q4H PRN Max 4/day HOLLI Lion      And    benztropine  0.5 mg Intramuscular Q4H PRN Max 4/day HOLLI Lion      benztropine  1 mg  Intramuscular Q4H PRN Max 6/day Jordan C Holter, DO      haloperidol lactate  5 mg Intramuscular Q4H PRN Max 4/day HOLLI Lion      And    LORazepam  2 mg Intramuscular Q4H PRN Max 4/day HOLLI Lion      And    benztropine  1 mg Intramuscular Q4H PRN Max 4/day HOLLI Lion      benztropine  1 mg Oral Q4H PRN Max 6/day HOLLI Lion      benztropine  1 mg Oral Q4H PRN Max 6/day [facility] Holter, DO      bisacodyl  10 mg Rectal Daily PRN HOLLI Lion      cloZAPine  500 mg Oral HS Bora Rosario MD      cyanocobalamin  1,000 mcg Oral Daily HOLLI Galvan      hydrOXYzine HCL  50 mg Oral Q6H PRN Max 4/day [facility] Holter, DO      Or    diphenhydrAMINE  50 mg Intramuscular Q6H PRN Ion  Holter, DO      hydrOXYzine HCL  50 mg Oral Q6H PRN Max 4/day HOLLI Lion      Or    diphenhydrAMINE  50 mg Intramuscular Q6H PRN HOLLI Lion      diphenhydrAMINE-zinc acetate   Topical BID PRN HOLLI Lion      ergocalciferol  50,000 Units Oral Weekly HOLLI Galvan      escitalopram  20 mg Oral Daily HOLLI Lion      haloperidol  1 mg Oral Q6H PRN HOLLI Lion      haloperidol  2.5 mg Oral Q4H PRN Max 4/day HOLLI Lion      haloperidol  5 mg Oral Q4H PRN Max 4/day HOLLI Lion      hydrocortisone   Topical 4x Daily PRN HOLLI Lion      hydrOXYzine HCL  100 mg Oral Q6H PRN Max 4/day [facility] Holter, DO      Or    LORazepam  2 mg Intramuscular Q6H PRN Ion  Holter, DO      hydrOXYzine HCL  100 mg Oral Q6H PRN Max 4/day HOLLI Lion      Or    LORazepam  2 mg Intramuscular Q6H PRN HOLLI Lion      hydrOXYzine HCL  25 mg Oral Q6H PRN Max 4/day [facility] Holter, DO      ibuprofen  600 mg Oral Q8H PRN HOLLI Lion      melatonin  3 mg Oral HS Jordan C Holter, DO      metFORMIN  500 mg Oral Daily With Breakfast HOLLI Lion      methocarbamol  500 mg Oral Q6H PRN HOLLI Lion      nicotine polacrilex  2  mg Oral Q4H PRN Bora Rosario MD      OLANZapine  5 mg Oral Q4H PRN Max 3/day [facility] Holter, DO      Or    OLANZapine  2.5 mg Intramuscular Q4H PRN Max 3/day [facility] Holter, DO      OLANZapine  5 mg Oral Q3H PRN Max 3/day [facility] Holter, DO      Or    OLANZapine  5 mg Intramuscular Q3H PRN Max 3/day [facility] Holter, DO      OLANZapine  2.5 mg Oral Q4H PRN Max 6/day [facility] Holter, DO      ondansetron  4 mg Oral Q6H PRN HOLLI Lion      polyethylene glycol  17 g Oral Daily HOLLI Lion      polyethylene glycol  17 g Oral Daily PRN [facility] Holter, DO      propranolol  10 mg Oral Q12H KAYLIE HOLLI Lion      risperiDONE  2 mg Oral HS Bora Rosario MD      senna-docusate sodium  1 tablet Oral Daily PRN [facility] Holter, DO      senna-docusate sodium  2 tablet Oral BID HOLLI Lion      traZODone  50 mg Oral HS PRN HOLLI Lion      white petrolatum-mineral oil   Topical TID PRN HOLLI Lion       Risks / Benefits of Treatment:    Risks, benefits, and possible side effects of medications explained to patient and patient verbalizes understanding and agreement for treatment.    Counseling / Coordination of Care:    Patient's progress discussed with staff in treatment team meeting.  Medications, treatment progress and treatment plan reviewed with patient.    HOLLI Gilliam 05/04/24

## 2024-05-04 NOTE — NURSING NOTE
Alberto has been visible intermittently in the milieu. Interacts with select peers. Pleasant and cooperative upon approach by staff. Denies anxiety, depression and pain. Admits to auditory hallucinations that come and go.  Ate 100% of his meals. Took medication without difficulty. Nicotine gum given at 0827. Attended most groups this shift. Behavior controlled. Continue to monitor. Precautions maintained.

## 2024-05-04 NOTE — NURSING NOTE
Alberto is present on the unit; he frequently sits and walks with several female peers; socializing with them. He denied any AH with this writer. Behavior is calm, he is pleasant but gives minimal interaction with writer.  He asked for Q tips to clean his inner ears and he was dissuaded from doing so  His pulse was tachy 112 at 2100 but came down tp 90 within the  hour

## 2024-05-04 NOTE — PLAN OF CARE
Problem: Alteration in Thoughts and Perception  Goal: Treatment Goal: Gain control of psychotic behaviors/thinking, reduce/eliminate presenting symptoms and demonstrate improved reality functioning upon discharge  Outcome: Progressing  Goal: Verbalize thoughts and feelings  Description: Interventions:  - Promote a nonjudgmental and trusting relationship with the patient through active listening and therapeutic communication  - Assess patient's level of functioning, behavior and potential for risk  - Engage patient in 1 on 1 interactions  - Encourage patient to express fears, feelings, frustrations, and discuss symptoms    - [place] patient to reality, help patient recognize reality-based thinking   - Administer medications as ordered and assess for potential side effects  - Provide the patient education related to the signs and symptoms of the illness and desired effects of prescribed medications  Outcome: Progressing  Goal: Refrain from acting on delusional thinking/internal stimuli  Description: Interventions:  - Monitor patient closely, per order   - Utilize least restrictive measures   - Set reasonable limits, give positive feedback for acceptable   - Administer medications as ordered and monitor of potential side effects  Outcome: Progressing  Goal: Agree to be compliant with medication regime, as prescribed and report medication side effects  Description: Interventions:  - Offer appropriate PRN medication and supervise ingestion; conduct AIMS, as needed   Outcome: Progressing  Goal: Attend and participate in unit activities, including therapeutic, recreational, and educational groups  Description: Interventions:  -Encourage Visitation and family involvement in care  Outcome: Progressing  Goal: Recognize dysfunctional thoughts, communicate reality-based thoughts at the time of discharge  Description: Interventions:  - Provide medication and psycho-education to assist patient in compliance and developing  insight into his/her illness   Outcome: Progressing  Goal: Complete daily ADLs, including personal hygiene independently, as able  Description: Interventions:  - Observe, teach, and assist patient with ADLS  - Monitor and promote a balance of rest/activity, with adequate nutrition and elimination   Outcome: Progressing     Problem: Ineffective Coping  Goal: Identifies ineffective coping skills  Outcome: Progressing  Goal: Identifies healthy coping skills  Outcome: Progressing  Goal: Demonstrates healthy coping skills  Outcome: Progressing  Goal: Participates in unit activities  Description: Interventions:  - Provide therapeutic environment   - Provide required programming   - Redirect inappropriate behaviors   Outcome: Progressing  Goal: Patient/Family participate in treatment and DC plans  Description: Interventions:  - Provide therapeutic environment  Outcome: Progressing  Goal: Patient/Family verbalizes awareness of resources  Outcome: Progressing     Problem: Depression  Goal: Treatment Goal: Demonstrate behavioral control of depressive symptoms, verbalize feelings of improved mood/affect, and adopt new coping skills prior to discharge  Outcome: Progressing  Goal: Verbalize thoughts and feelings  Description: Interventions:  - Assess and re-assess patient's level of risk   - Engage patient in 1:1 interactions, daily, for a minimum of 15 minutes   - Encourage patient to express feelings, fears, frustrations, hopes   Outcome: Progressing  Goal: Refrain from harming self  Description: Interventions:  - Monitor patient closely, per order   - Supervise medication ingestion, monitor effects and side effects   Outcome: Progressing  Goal: Refrain from isolation  Description: Interventions:  - Develop a trusting relationship   - Encourage socialization   Outcome: Progressing  Goal: Refrain from self-neglect  Outcome: Progressing  Goal: Attend and participate in unit activities, including therapeutic, recreational, and  educational groups  Description: Interventions:  - Provide therapeutic and educational activities daily, encourage attendance and participation, and document same in the medical record   Outcome: Progressing  Goal: Complete daily ADLs, including personal hygiene independently, as able  Description: Interventions:  - Observe, teach, and assist patient with ADLS  -  Monitor and promote a balance of rest/activity, with adequate nutrition and elimination   Outcome: Progressing     Problem: Anxiety  Goal: Anxiety is at manageable level  Description: Interventions:  - Assess and monitor patient's anxiety level.   - Monitor for signs and symptoms (heart palpitations, chest pain, shortness of breath, headaches, nausea, feeling jumpy, restlessness, irritable, apprehensive).   - Collaborate with interdisciplinary team and initiate plan and interventions as ordered.  - [place] patient to unit/surroundings  - Explain treatment plan  - Encourage participation in care  - Encourage verbalization of concerns/fears  - Identify coping mechanisms  - Assist in developing anxiety-reducing skills  - Administer/offer alternative therapies  - Limit or eliminate stimulants  Outcome: Progressing     Problem: Alteration in Orientation  Goal: Treatment Goal: Demonstrate a reduction of confusion and improved orientation to person, place, time and/or situation upon discharge, according to optimum baseline/ability  Outcome: Progressing  Goal: Interact with staff daily  Description: Interventions:  - Assess and re-assess patient's level of orientation  - Engage patient in 1 on 1 interactions, daily, for a minimum of 15 minutes   - Establish rapport/trust with patient   Outcome: Progressing  Goal: Express concerns related to confused thinking related to:  Description: Interventions:  - Encourage patient to express feelings, fears, frustrations, hopes  - Assign consistent caregivers   - [place]/re-orient patient as needed  - Allow comfort items, as  appropriate  - Provide visual cues, signs, etc.   Outcome: Progressing  Goal: Allow medical examinations, as recommended  Description: Interventions:  - Provide physical/neurological exams and/or referrals, per provider   Outcome: Progressing  Goal: Cooperate with recommended testing/procedures  Description: Interventions:  - Determine need for ancillary testing  - Observe for mental status changes  - Implement falls/precaution protocol   Outcome: Progressing  Goal: Attend and participate in unit activities, including therapeutic, recreational, and educational groups  Description: Interventions:  - Provide therapeutic and educational activities daily, encourage attendance and participation, and document same in the medical record   - Provide appropriate opportunities for reminiscence   - Provide a consistent daily routine   - Encourage family contact/visitation   Outcome: Progressing  Goal: Complete daily ADLs, including personal hygiene independently, as able  Description: Interventions:  - Observe, teach, and assist patient with ADLS  Outcome: Progressing     Problem: Individualized Interventions  Goal: Patient will verbalize appropriate use of telephone within 5 days  Description: Interventions:  - Treatment team to determine use of supervised phone privileges   Outcome: Progressing  Goal: Patient will verbalize need for hospitalization and will no longer attempt elopement within 5 days  Description: Interventions:  - Ongoing education to help patient understand need for hospitalization  Outcome: Progressing  Goal: Patient will recognize inappropriate behaviors and develop alternative behaviors within 5 days  Description: Interventions:  - Patient in collaboration with Treatment Team will develop a behavior management plan to help identify effective coping skills to deal with stressors  Outcome: Progressing     Problem: Electroconvulsive therapy (ECT)  Goal: Treatment Goal: Demonstrate a reduction of confusion  and improved orientation to person, place, time and/or situation upon discharge, according to optimum baseline/ability  Outcome: Progressing  Goal: Verbalizes/displays adequate comfort level or baseline comfort level  Description: Interventions:  - Encourage patient to monitor pain and request assistance  - Assess pain using appropriate pain scale  - Administer analgesics based on type and severity of pain and evaluate response  - Implement non-pharmacological measures as appropriate and evaluate response  - Consider cultural and social influences on pain and pain management  - Notify physician/advanced practitioner if interventions unsuccessful or patient reports new pain  Outcome: Progressing  Goal: Achieves stable or improved neurological status  Description: INTERVENTIONS  - Monitor and report changes in neurological status  - Monitor vital signs such as temperature, blood pressure, glucose, and any other labs ordered   - Initiate measures to prevent increased intracranial pressure  - Monitor for seizure activity and implement precautions if appropriate      Outcome: Progressing  Goal: Maintain or return mobility to safest level of function  Description: INTERVENTIONS:  - Assess patient's ability to carry out ADLs; assess patient's baseline for ADL function and identify physical deficits which impact ability to perform ADLs (bathing, care of mouth/teeth, toileting, grooming, dressing, etc.)  - Assess/evaluate cause of self-care deficits   - Assess range of motion  - Assess patient's mobility  - Assess patient's need for assistive devices and provide as appropriate  - Encourage maximum independence but intervene and supervise when necessary  - Involve family in performance of ADLs  - Assess for home care needs following discharge   - Consider OT consult to assist with ADL evaluation and planning for discharge  - Provide patient education as appropriate  Outcome: Progressing  Goal: Absence of urinary  retention  Description: INTERVENTIONS:  - Assess patient’s ability to void and empty bladder  - Monitor I/O  - Bladder scan as needed  - Discuss with physician/AP medications to alleviate retention as needed  - Discuss catheterization for long term situations as appropriate  Outcome: Progressing  Goal: Minimal or absence of nausea and/or vomiting  Description: INTERVENTIONS:  - Administer IV fluids if ordered to ensure adequate hydration  - Maintain NPO status until nausea and vomiting are resolved  - Nasogastric tube if ordered  - Administer ordered antiemetic medications as needed  - Provide nonpharmacologic comfort measures as appropriate  - Advance diet as tolerated, if ordered  - Consider nutrition services referral to assist patient with adequate nutrition and appropriate food choices  Outcome: Progressing

## 2024-05-05 PROCEDURE — 99232 SBSQ HOSP IP/OBS MODERATE 35: CPT | Performed by: STUDENT IN AN ORGANIZED HEALTH CARE EDUCATION/TRAINING PROGRAM

## 2024-05-05 RX ADMIN — ESCITALOPRAM OXALATE 20 MG: 10 TABLET, FILM COATED ORAL at 08:44

## 2024-05-05 RX ADMIN — NICOTINE POLACRILEX 2 MG: 2 GUM, CHEWING ORAL at 08:45

## 2024-05-05 RX ADMIN — AMLODIPINE BESYLATE 5 MG: 5 TABLET ORAL at 08:45

## 2024-05-05 RX ADMIN — RISPERIDONE 2 MG: 2 TABLET, FILM COATED ORAL at 21:16

## 2024-05-05 RX ADMIN — PROPRANOLOL HYDROCHLORIDE 10 MG: 10 TABLET ORAL at 08:44

## 2024-05-05 RX ADMIN — NICOTINE POLACRILEX 2 MG: 2 GUM, CHEWING ORAL at 12:52

## 2024-05-05 RX ADMIN — MELATONIN TAB 3 MG 3 MG: 3 TAB at 21:15

## 2024-05-05 RX ADMIN — NICOTINE POLACRILEX 2 MG: 2 GUM, CHEWING ORAL at 17:33

## 2024-05-05 RX ADMIN — PROPRANOLOL HYDROCHLORIDE 10 MG: 10 TABLET ORAL at 21:15

## 2024-05-05 RX ADMIN — METFORMIN HYDROCHLORIDE 500 MG: 500 TABLET ORAL at 08:44

## 2024-05-05 RX ADMIN — SENNOSIDES AND DOCUSATE SODIUM 2 TABLET: 8.6; 5 TABLET ORAL at 08:44

## 2024-05-05 RX ADMIN — POLYETHYLENE GLYCOL 3350 17 G: 17 POWDER, FOR SOLUTION ORAL at 08:45

## 2024-05-05 RX ADMIN — CLOZAPINE 500 MG: 100 TABLET ORAL at 21:15

## 2024-05-05 RX ADMIN — SENNOSIDES AND DOCUSATE SODIUM 2 TABLET: 8.6; 5 TABLET ORAL at 17:03

## 2024-05-05 RX ADMIN — NICOTINE POLACRILEX 2 MG: 2 GUM, CHEWING ORAL at 21:16

## 2024-05-05 RX ADMIN — CYANOCOBALAMIN TAB 1000 MCG 1000 MCG: 1000 TAB at 08:44

## 2024-05-05 NOTE — NURSING NOTE
"Alberto was sleepy this AM. Had to be woken for breakfast, which he ate 100%. Took medication without difficulty. Denies anxiety, depression and pain. Still with voices, that come and go, telling him they are \"going to kill him\". No thoughts to harm himself. Able to make needs known to staff. Had Nicotine gum at 0845 and 1252. Attended Coping Skills and spirituality group. Ate 100% of lunch. Social with select peers. No issues or behaviors. Weekly Wellness Assessment WDL, except for dry skin on feet. Continue to monitor. Precautions maintained.   "

## 2024-05-05 NOTE — PROGRESS NOTES
Progress Note - Behavioral Health     Alberto Berumen 28 y.o. male MRN: [id number]   Unit/Bed#: [facility] 101-02 Encounter: [id number]    Documentation, nursing notes, medication reconciliation, labs, and vitals reviewed. Patient was seen for continuing care and reviewed with treatment team. No acute events over the past 24 hours.  Per nursing report, patient has been intermittently visible in milieu, some peer interactions, ongoing AH, med and meal compliant.    No scheduled medication changes over the past 24 hours. Continues to tolerate current medications with no adverse effects. On evaluation today, Alberto is found resting in bed. He reports his mood has been okay. He reports ongoing intermittent AH telling him they will kill him and his family. He denies SI/HI or VH. He slept well overnight.     Psychiatric ROS:  Behavior over the last 24 hours: unchanged  Sleep: normal  Appetite: normal  Medication side effects: No   ROS: no complaints, all other systems are negative    Mental Status Evaluation:    Appearance:  marginal hygiene, dressed in hospital attire   Behavior:  pleasant, cooperative, calm, poor eye contact   Speech:  scant, soft   Mood:  dysphoric   Affect:  flat   Thought Process:  coherent, goal directed   Associations: intact associations   Thought Content:  some paranoia   Perceptual Disturbances: auditory hallucinations   Risk Potential: Suicidal ideation - None  Homicidal ideation - None  Potential for aggression - No   Sensorium:  oriented to person, place, time/date, and situation   Memory:  recent and remote memory grossly intact   Consciousness:  alert and awake   Attention/Concentration: attention span and concentration appear shorter than expected for age   Insight:  limited   Judgment: limited   Gait/Station: in bed   Motor Activity: no abnormal movements     Vital signs in last 24 hours:    Temp:  [97.7 °F (36.5 °C)-97.8 °F (36.6 °C)] 97.7 °F (36.5 °C)  HR:  [100-112] 112  Resp:  [18]  18  BP: (124-149)/(70-89) 132/70    Laboratory results: I have personally reviewed all pertinent laboratory/tests results    Results from the past 24 hours: No results found for this or any previous visit (from the past 24 hour(s)).  Most Recent Labs:   Lab Results   Component Value Date    WBC 9.06 05/02/2024    RBC 5.02 05/02/2024    HGB 11.8 (L) 05/02/2024    HCT 40.6 05/02/2024     05/02/2024    RDW 14.7 05/02/2024    NEUTROABS 4.73 05/02/2024    SODIUM 139 04/01/2024    K 4.1 04/01/2024     04/01/2024    CO2 27 04/01/2024    BUN 12 04/01/2024    CREATININE 0.90 04/01/2024    GLUC 120 04/01/2024    CALCIUM 9.3 04/01/2024    AST 23 04/01/2024    ALT 53 (H) 04/01/2024    ALKPHOS 84 04/01/2024    TP 7.2 04/01/2024    ALB 4.0 04/01/2024    TBILI 0.23 04/01/2024    CHOLESTEROL 156 03/14/2024    HDL 44 03/14/2024    TRIG 133 03/14/2024    LDLCALC 85 03/14/2024    NONHDLC 112 03/14/2024    LITHIUM 0.61 01/09/2024    [id number] 1.062 11/15/2023    SYPHILISAB Non-reactive 11/18/2023    HGBA1C 5.0 04/01/2024    EAG 97 04/01/2024       Suicide/Homicide Risk Assessment:    Risk of Harm to Self:   Nursing Suicide Risk Assessment Last 24 hours: C-SSRS Risk (Since Last Contact)  Calculated C-SSRS Risk Score (Since Last Contact): No Risk Indicated  Current Specific Risk Factors include: diagnosis of mood disorder, mental illness diagnosis, current psychotic symptoms  Protective Factors: no current suicidal ideation, ability to communicate with staff on the unit, taking medications as ordered on the unit  Based on today's assessment, Alberto presents the following risk of harm to self: low    Risk of Harm to Others:  Nursing Homicide Risk Assessment: Violence Risk to Others: Denies within past 6 months  Current Specific Risk Factors include: diagnosis of mood disorder, current psychotic symptoms  Protective Factors: no current homicidal ideation, compliant with medications on the unit as ordered, compliant with  unit milieu  Based on today's assessment, Alberto presents the following risk of harm to others: low    The following interventions are recommended: behavioral checks every 7 minutes, continued hospitalization on locked unit    Progress Toward Goals: progressing    Assessment/Plan   Principal Problem:    Schizoaffective disorder, bipolar type (HCC)  Active Problems:    GERD (gastroesophageal reflux disease)    Medical clearance for psychiatric admission    Tobacco abuse    T wave inversion in EKG    Chronic idiopathic constipation    Confluent and reticulate papillomatosis    Primary hypertension    Elevated hemoglobin A1c      Recommended Treatment:     Planned medication and treatment changes:    All current active medications have been reviewed  Encourage group therapy, milieu therapy and occupational therapy  Behavioral Health checks every 7 minutes  Monitor weekly BNP, CRP, CBC w/diff, CK and clozapine level  Continue current medications:    Current Facility-Administered Medications   Medication Dose Route Frequency Provider Last Rate    acetaminophen  650 mg Oral Q4H PRN Jordan C Holter, DO      acetaminophen  650 mg Oral Q6H PRN HOLLI Lion      aluminum-magnesium hydroxide-simethicone  30 mL Oral Q4H PRN Jordan C Holter, DO      amLODIPine  5 mg Oral Daily HOLLI Lion      Artificial Tears  1 drop Both Eyes Q3H PRN Jordan C Holter, DO      atropine  1 drop Sublingual HS HOLLI Lion      atropine  1 drop Sublingual Daily PRN STEVE iLonNP      haloperidol lactate  2.5 mg Intramuscular Q4H PRN Max 4/day HOLLI Lion      And    LORazepam  1 mg Intramuscular Q4H PRN Max 4/day HOLLI Lion      And    benztropine  0.5 mg Intramuscular Q4H PRN Max 4/day HOLLI Lion      benztropine  1 mg Intramuscular Q4H PRN Max 6/day Jordan C Holter, DO      haloperidol lactate  5 mg Intramuscular Q4H PRN Max 4/day HOLLI Lion      And    LORazepam  2 mg Intramuscular Q4H PRN  Max 4/day HOLLI Lion      And    benztropine  1 mg Intramuscular Q4H PRN Max 4/day HOLLI Lion      benztropine  1 mg Oral Q4H PRN Max 6/day HOLLI Lion      benztropine  1 mg Oral Q4H PRN Max 6/day Jordan C Holter, DO      bisacodyl  10 mg Rectal Daily PRN HOLLI Lion      cloZAPine  500 mg Oral HS Bora Rosario MD      cyanocobalamin  1,000 mcg Oral Daily HOLLI Galvan      hydrOXYzine HCL  50 mg Oral Q6H PRN Max 4/day [facility] Holter, DO      Or    diphenhydrAMINE  50 mg Intramuscular Q6H PRN Ion  Holter, DO      hydrOXYzine HCL  50 mg Oral Q6H PRN Max 4/day HOLLI Lion      Or    diphenhydrAMINE  50 mg Intramuscular Q6H PRN HOLLI Lion      diphenhydrAMINE-zinc acetate   Topical BID PRN HOLLI Lion      ergocalciferol  50,000 Units Oral Weekly HOLLI Galvan      escitalopram  20 mg Oral Daily HOLLI Lion      haloperidol  1 mg Oral Q6H PRN HOLLI Lion      haloperidol  2.5 mg Oral Q4H PRN Max 4/day HOLLI Lion      haloperidol  5 mg Oral Q4H PRN Max 4/day HOLLI Lion      hydrocortisone   Topical 4x Daily PRN HOLLI Lion      hydrOXYzine HCL  100 mg Oral Q6H PRN Max 4/day Jordan C Holter, DO      Or    LORazepam  2 mg Intramuscular Q6H PRN Ion  Holter, DO      hydrOXYzine HCL  100 mg Oral Q6H PRN Max 4/day HOLLI Lion      Or    LORazepam  2 mg Intramuscular Q6H PRN HOLLI Lion      hydrOXYzine HCL  25 mg Oral Q6H PRN Max 4/day [facility] Cresencioter, DO      ibuprofen  600 mg Oral Q8H PRN HOLLI Lion      melatonin  3 mg Oral HS Ion  Holter, DO      metFORMIN  500 mg Oral Daily With Breakfast HOLLI Lion      methocarbamol  500 mg Oral Q6H PRN HOLLI Lion      nicotine polacrilex  2 mg Oral Q4H PRN Bora Rosario MD      OLANZapine  5 mg Oral Q4H PRN Max 3/day Jordan C Holter, DO      Or    OLANZapine  2.5 mg Intramuscular Q4H PRN Max 3/day Jordan C Holter, DO       OLANZapine  5 mg Oral Q3H PRN Max 3/day Ion FISCHER Holter, DO      Or    OLANZapine  5 mg Intramuscular Q3H PRN Max 3/day Ion FISCHER Holter, DO      OLANZapine  2.5 mg Oral Q4H PRN Max 6/day Ion FISCHER Holter, DO      ondansetron  4 mg Oral Q6H PRN HOLLI Lion      polyethylene glycol  17 g Oral Daily HOLLI Lion      polyethylene glycol  17 g Oral Daily PRN Ion Dupontter, DO      propranolol  10 mg Oral Q12H KAYLIE HOLLI Lion      risperiDONE  2 mg Oral HS Bora Rosario MD      senna-docusate sodium  1 tablet Oral Daily PRN Jordan C Holter, DO      senna-docusate sodium  2 tablet Oral BID HOLLI Lion      traZODone  50 mg Oral HS PRN HOLLI Lion      white petrolatum-mineral oil   Topical TID PRN HOLLI Lion       Risks / Benefits of Treatment:    Risks, benefits, and possible side effects of medications explained to patient and patient verbalizes understanding and agreement for treatment.    Counseling / Coordination of Care:    Patient's progress discussed with staff in treatment team meeting.  Medications, treatment progress and treatment plan reviewed with patient.    HOLLI Gilliam 05/05/24

## 2024-05-05 NOTE — PLAN OF CARE
Problem: Alteration in Thoughts and Perception  Goal: Treatment Goal: Gain control of psychotic behaviors/thinking, reduce/eliminate presenting symptoms and demonstrate improved reality functioning upon discharge  Outcome: Progressing  Goal: Verbalize thoughts and feelings  Description: Interventions:  - Promote a nonjudgmental and trusting relationship with the patient through active listening and therapeutic communication  - Assess patient's level of functioning, behavior and potential for risk  - Engage patient in 1 on 1 interactions  - Encourage patient to express fears, feelings, frustrations, and discuss symptoms    - [place] patient to reality, help patient recognize reality-based thinking   - Administer medications as ordered and assess for potential side effects  - Provide the patient education related to the signs and symptoms of the illness and desired effects of prescribed medications  Outcome: Progressing  Goal: Refrain from acting on delusional thinking/internal stimuli  Description: Interventions:  - Monitor patient closely, per order   - Utilize least restrictive measures   - Set reasonable limits, give positive feedback for acceptable   - Administer medications as ordered and monitor of potential side effects  Outcome: Progressing  Goal: Agree to be compliant with medication regime, as prescribed and report medication side effects  Description: Interventions:  - Offer appropriate PRN medication and supervise ingestion; conduct AIMS, as needed   Outcome: Progressing  Goal: Attend and participate in unit activities, including therapeutic, recreational, and educational groups  Description: Interventions:  -Encourage Visitation and family involvement in care  Outcome: Progressing  Goal: Recognize dysfunctional thoughts, communicate reality-based thoughts at the time of discharge  Description: Interventions:  - Provide medication and psycho-education to assist patient in compliance and developing  insight into his/her illness   Outcome: Progressing  Goal: Complete daily ADLs, including personal hygiene independently, as able  Description: Interventions:  - Observe, teach, and assist patient with ADLS  - Monitor and promote a balance of rest/activity, with adequate nutrition and elimination   Outcome: Progressing     Problem: Ineffective Coping  Goal: Identifies ineffective coping skills  Outcome: Progressing  Goal: Identifies healthy coping skills  Outcome: Progressing  Goal: Demonstrates healthy coping skills  Outcome: Progressing  Goal: Participates in unit activities  Description: Interventions:  - Provide therapeutic environment   - Provide required programming   - Redirect inappropriate behaviors   Outcome: Progressing     Problem: Depression  Goal: Refrain from harming self  Description: Interventions:  - Monitor patient closely, per order   - Supervise medication ingestion, monitor effects and side effects   Outcome: Progressing  Goal: Refrain from isolation  Description: Interventions:  - Develop a trusting relationship   - Encourage socialization   Outcome: Progressing  Goal: Refrain from self-neglect  Outcome: Progressing  Goal: Attend and participate in unit activities, including therapeutic, recreational, and educational groups  Description: Interventions:  - Provide therapeutic and educational activities daily, encourage attendance and participation, and document same in the medical record   Outcome: Progressing

## 2024-05-06 LAB
BASOPHILS # BLD AUTO: 0.05 THOUSANDS/ÂΜL (ref 0–0.1)
BASOPHILS NFR BLD AUTO: 1 % (ref 0–1)
BNP SERPL-MCNC: 9 PG/ML (ref 0–100)
CK SERPL-CCNC: 334 U/L (ref 39–308)
CLOZAPINE SERPL-MCNC: 494 NG/ML (ref 350–900)
CRP SERPL QL: 9.5 MG/L
EOSINOPHIL # BLD AUTO: 0.32 THOUSAND/ÂΜL (ref 0–0.61)
EOSINOPHIL NFR BLD AUTO: 3 % (ref 0–6)
ERYTHROCYTE [DISTWIDTH] IN BLOOD BY AUTOMATED COUNT: 15 % (ref 11.6–15.1)
HCT VFR BLD AUTO: 45.4 % (ref 36.5–49.3)
HGB BLD-MCNC: 12.4 G/DL (ref 12–17)
IMM GRANULOCYTES # BLD AUTO: 0.07 THOUSAND/UL (ref 0–0.2)
IMM GRANULOCYTES NFR BLD AUTO: 1 % (ref 0–2)
LYMPHOCYTES # BLD AUTO: 3 THOUSANDS/ÂΜL (ref 0.6–4.47)
LYMPHOCYTES NFR BLD AUTO: 29 % (ref 14–44)
MCH RBC QN AUTO: 23.3 PG (ref 26.8–34.3)
MCHC RBC AUTO-ENTMCNC: 27.3 G/DL (ref 31.4–37.4)
MCV RBC AUTO: 85 FL (ref 82–98)
MONOCYTES # BLD AUTO: 0.79 THOUSAND/ÂΜL (ref 0.17–1.22)
MONOCYTES NFR BLD AUTO: 8 % (ref 4–12)
NEUTROPHILS # BLD AUTO: 5.96 THOUSANDS/ÂΜL (ref 1.85–7.62)
NEUTS SEG NFR BLD AUTO: 58 % (ref 43–75)
NRBC BLD AUTO-RTO: 0 /100 WBCS
PLATELET # BLD AUTO: 272 THOUSANDS/UL (ref 149–390)
PMV BLD AUTO: 11 FL (ref 8.9–12.7)
RBC # BLD AUTO: 5.33 MILLION/UL (ref 3.88–5.62)
WBC # BLD AUTO: 10.19 THOUSAND/UL (ref 4.31–10.16)

## 2024-05-06 PROCEDURE — 99232 SBSQ HOSP IP/OBS MODERATE 35: CPT | Performed by: PSYCHIATRY & NEUROLOGY

## 2024-05-06 PROCEDURE — 82550 ASSAY OF CK (CPK): CPT | Performed by: PSYCHIATRY & NEUROLOGY

## 2024-05-06 PROCEDURE — 83880 ASSAY OF NATRIURETIC PEPTIDE: CPT | Performed by: PSYCHIATRY & NEUROLOGY

## 2024-05-06 PROCEDURE — 80159 DRUG ASSAY CLOZAPINE: CPT | Performed by: PSYCHIATRY & NEUROLOGY

## 2024-05-06 PROCEDURE — 85025 COMPLETE CBC W/AUTO DIFF WBC: CPT | Performed by: PSYCHIATRY & NEUROLOGY

## 2024-05-06 PROCEDURE — 86140 C-REACTIVE PROTEIN: CPT | Performed by: PSYCHIATRY & NEUROLOGY

## 2024-05-06 RX ADMIN — CLOZAPINE 500 MG: 100 TABLET ORAL at 21:22

## 2024-05-06 RX ADMIN — NICOTINE POLACRILEX 2 MG: 2 GUM, CHEWING ORAL at 18:17

## 2024-05-06 RX ADMIN — RISPERIDONE 2 MG: 2 TABLET, FILM COATED ORAL at 21:22

## 2024-05-06 RX ADMIN — NICOTINE POLACRILEX 2 MG: 2 GUM, CHEWING ORAL at 08:22

## 2024-05-06 RX ADMIN — NICOTINE POLACRILEX 2 MG: 2 GUM, CHEWING ORAL at 14:16

## 2024-05-06 RX ADMIN — ESCITALOPRAM OXALATE 20 MG: 10 TABLET, FILM COATED ORAL at 08:22

## 2024-05-06 RX ADMIN — AMLODIPINE BESYLATE 5 MG: 5 TABLET ORAL at 08:21

## 2024-05-06 RX ADMIN — SENNOSIDES AND DOCUSATE SODIUM 2 TABLET: 8.6; 5 TABLET ORAL at 08:22

## 2024-05-06 RX ADMIN — NICOTINE POLACRILEX 2 MG: 2 GUM, CHEWING ORAL at 22:17

## 2024-05-06 RX ADMIN — SENNOSIDES AND DOCUSATE SODIUM 2 TABLET: 8.6; 5 TABLET ORAL at 17:12

## 2024-05-06 RX ADMIN — PROPRANOLOL HYDROCHLORIDE 10 MG: 10 TABLET ORAL at 08:22

## 2024-05-06 RX ADMIN — CYANOCOBALAMIN TAB 1000 MCG 1000 MCG: 1000 TAB at 08:22

## 2024-05-06 RX ADMIN — MELATONIN TAB 3 MG 3 MG: 3 TAB at 21:22

## 2024-05-06 RX ADMIN — PROPRANOLOL HYDROCHLORIDE 10 MG: 10 TABLET ORAL at 21:22

## 2024-05-06 RX ADMIN — METFORMIN HYDROCHLORIDE 500 MG: 500 TABLET ORAL at 08:22

## 2024-05-06 RX ADMIN — POLYETHYLENE GLYCOL 3350 17 G: 17 POWDER, FOR SOLUTION ORAL at 08:21

## 2024-05-06 NOTE — PROGRESS NOTES
"Progress Note - Behavioral Health   Alberto Berumen 28 y.o. male MRN: [id number]  Unit/Bed#: [facility] 101-02 Encounter: [id number]    Assessment/Plan   Principal Problem:    Schizoaffective disorder, bipolar type ([facility])  Active Problems:    GERD (gastroesophageal reflux disease)    Medical clearance for psychiatric admission    Tobacco abuse    T wave inversion in EKG    Chronic idiopathic constipation    Confluent and reticulate papillomatosis    Primary hypertension    Elevated hemoglobin A1c      Behavior over the last 24 hours:  improved  Sleep: normal  Appetite: normal  Medication side effects: No  ROS: no complaints    Subjective: Patient is a 28 year old male, with schizoaffective disorder, bipolar type. He attended 2/8 groups. He reports constant auditory hallucinations, threatening to kill him and his family. Staffing states that he is social with select peers, and is superficially pleasant. He states that ECT makes him fatigued the rest of the day on Fridays, but he thinks ECT is beneficial as it allows him \"to be out of his head a little better.\" He reports that he feels paranoid about the voices \"here and there.\" Staffing denies any behavioral issues over the weekend -- he states that he had a \"pretty good\" weekend.     Assessment: He continues to have auditory hallucinations that are not commanding. He finds benefit in ECT, and is medication compliant.     Mental Status Evaluation:  Appearance:  age appropriate and casually dressed. He is wearing hospital attire -- a grey patient gown and teal loose pants. He has black hair in dreadlocks. He appears to be well-groomed.    Behavior:  psychomotor retardation. He appears fatigued though he is cooperative, pleasant, calm, and makes good eye contact.    Speech:  increased latency of response. Normal volume, normal pitch. No speech impediments.    Mood:  \"I don't know, stable\"   Affect:  blunted and mood-congruent   Thought Process:  goal directed and logical " "  Associations: intact associations   Thought Content:  delusions  persecutory. He reports occasional paranoia toward the voices. Denies any experiences leading to grandiose, bizarre, or somatic delusions. Denies SI, HI, phobias, obsessions, compulsions, or distorted body perceptions. Denies preoccupation with violence or death. He does not appear to be hypervigilant.    Perceptual Disturbances: Auditory hallucinations without commands. He states that there are multiple voices -- that they tell him they will kill him and his family. Denies tactile, visual, gustatory, or olfactory hallucinations. Denies feelings of depersonalization, derealization, or illusions. He does not appear to be responding to internal stimuli.    Risk Potential: Suicidal Ideations none  Homicidal Ideations none  Potential for Aggression No   Sensorium:  person, place, time/date, situation, and day of week   Memory:  recent memory intact, remote memory intact, immediate memory intact  Immediate memory: able to relay 3 unrelated words without error (shoe, desk, blue)  Recent memory: able to recall what he had for dinner last night; aware of who the current [place] president is; able to recall the 3 unrelated words with some further guidance  Remote memory: able to relay what street his childhood home was on   Consciousness:  alert    Attention: attention span and concentration were age appropriate  Attention: able to repeat a phone number  Concentration: able to relay the months of the year backwards from December without error   Intelligence:  Fund of general knowledge: able to recall how many [place] states there are  Calculation ability: able to perform simple multiplication (2x7;3x7)  Problem solving: able to express the reason the moon appears larger  Abstract interpretation: able to express a similarity between a flower and a tree (\"they both grow from the dirt\")  Estimate of intelligence: average   Insight:  age appropriate: he is aware of his " diagnosis and need for treatment   Judgment: age appropriate: he appropriately relays what number to call if he witnessed an accident. Social judgment: he is dressed appropriately, and acts appropriately.   Gait/Station: normal gait/station and slow   Motor Activity: no abnormal movements     Progress Toward Goals: He is improving in his goals; he attends group therapy, and finds benefit in ECT. He is medication compliant. He continues to have auditory hallucinations and paranoid delusions.     Recommended Treatment: Continue with group therapy, milieu therapy and occupational therapy.      Discharge plan: Plan is to discharge home to his aunt's house with ACT team established to ensure he is compliant with his medications and to assist with transitioning into the community.     Risks, benefits and possible side effects of Medications:   Risks, benefits, and possible side effects of medications explained to patient and patient verbalizes understanding.      Medications: all current active meds have been reviewed.    Labs: I have personally reviewed all pertinent laboratory/tests results.     Counseling / Coordination of Care  Total floor / unit time spent today 30 minutes. Greater than 50% of total time was spent with the patient and / or family counseling and / or coordination of care.

## 2024-05-06 NOTE — PLAN OF CARE
Problem: Alteration in Thoughts and Perception  Goal: Verbalize thoughts and feelings  Description: Interventions:  - Promote a nonjudgmental and trusting relationship with the patient through active listening and therapeutic communication  - Assess patient's level of functioning, behavior and potential for risk  - Engage patient in 1 on 1 interactions  - Encourage patient to express fears, feelings, frustrations, and discuss symptoms    - [place] patient to reality, help patient recognize reality-based thinking   - Administer medications as ordered and assess for potential side effects  - Provide the patient education related to the signs and symptoms of the illness and desired effects of prescribed medications  Outcome: Progressing  Goal: Agree to be compliant with medication regime, as prescribed and report medication side effects  Description: Interventions:  - Offer appropriate PRN medication and supervise ingestion; conduct AIMS, as needed   Outcome: Progressing  Goal: Attend and participate in unit activities, including therapeutic, recreational, and educational groups  Description: Interventions:  -Encourage Visitation and family involvement in care  Outcome: Progressing  Goal: Recognize dysfunctional thoughts, communicate reality-based thoughts at the time of discharge  Description: Interventions:  - Provide medication and psycho-education to assist patient in compliance and developing insight into his/her illness   Outcome: Progressing  Goal: Complete daily ADLs, including personal hygiene independently, as able  Description: Interventions:  - Observe, teach, and assist patient with ADLS  - Monitor and promote a balance of rest/activity, with adequate nutrition and elimination   Outcome: Progressing     Problem: Ineffective Coping  Goal: Demonstrates healthy coping skills  Outcome: Progressing  Goal: Participates in unit activities  Description: Interventions:  - Provide therapeutic environment   -  Provide required programming   - Redirect inappropriate behaviors   Outcome: Progressing  Goal: Patient/Family verbalizes awareness of resources  Outcome: Progressing     Problem: Depression  Goal: Verbalize thoughts and feelings  Description: Interventions:  - Assess and re-assess patient's level of risk   - Engage patient in 1:1 interactions, daily, for a minimum of 15 minutes   - Encourage patient to express feelings, fears, frustrations, hopes   Outcome: Progressing  Goal: Refrain from harming self  Description: Interventions:  - Monitor patient closely, per order   - Supervise medication ingestion, monitor effects and side effects   Outcome: Progressing  Goal: Refrain from isolation  Description: Interventions:  - Develop a trusting relationship   - Encourage socialization   Outcome: Progressing  Goal: Refrain from self-neglect  Outcome: Progressing     Problem: Alteration in Thoughts and Perception  Goal: Treatment Goal: Gain control of psychotic behaviors/thinking, reduce/eliminate presenting symptoms and demonstrate improved reality functioning upon discharge  Outcome: Not Progressing  Goal: Refrain from acting on delusional thinking/internal stimuli  Description: Interventions:  - Monitor patient closely, per order   - Utilize least restrictive measures   - Set reasonable limits, give positive feedback for acceptable   - Administer medications as ordered and monitor of potential side effects  Outcome: Not Progressing

## 2024-05-06 NOTE — NURSING NOTE
Pt is visible on the unit and social with select peers. Consumed 100% of dinner. Took medications without incidence but refused atropine drops. Pt is pleasant and cooperative. Attended most groups. Reports AH that continue to threaten to kill him and his family, and denies other psych symptoms. No behavioral issues. Pt offers no concerns or complaints. VSS. Continuous safety checks maintained.

## 2024-05-06 NOTE — NURSING NOTE
"Pt is accepting of medications without incidence and meal compliant consuming 25% of breakfast and 100% of lunch. Pt is visible in the milieu most of shift. Social with select peers, playing games and attending groups. Pt reports AH as \"the same as always\", but denies all other s/s. Utilizes prn nicotine gum during shift. Pt offers no new concerns at this time.   "

## 2024-05-06 NOTE — NURSING NOTE
Alberto is visible on the unit and socializing with several (mostly female) peers. He is quiet and calm and cooperative and pleasant.  He denies SI/HI and any AVH.  He is pleasantly superficial.]  He had no complaints  He enjoyed the [place] group in the evening

## 2024-05-06 NOTE — PROGRESS NOTES
05/06/24 0751   Team Meeting   Meeting Type Daily Rounds   Team Members Present   Team Members Present Physician;Nurse;;Other (Discipline and Name)   Patient/Family Present   Patient Present No   Patient's Family Present No     In attendance:  Dr. Alex Thomas, MD Dr. Jordan Holter, DO Arin Travis, RN  Luisana Alvarado, [facility]  MATILDA Daniel.S.    Groups: 2/8    Pt reports ongoing auditory hallucinations. Pt has been social with peers and pleasant but superficial with staff. No bx concerns noted.

## 2024-05-06 NOTE — PLAN OF CARE
Problem: Ineffective Coping  Goal: Identifies ineffective coping skills  Outcome: Progressing  Goal: Identifies healthy coping skills  Outcome: Progressing  Goal: Demonstrates healthy coping skills  Outcome: Progressing  Goal: Participates in unit activities  Description: Interventions:  - Provide therapeutic environment   - Provide required programming   - Redirect inappropriate behaviors   Outcome: Progressing   Alberto attended 29/42 groups offered last week.

## 2024-05-07 PROCEDURE — 99232 SBSQ HOSP IP/OBS MODERATE 35: CPT | Performed by: PSYCHIATRY & NEUROLOGY

## 2024-05-07 RX ADMIN — CLOZAPINE 500 MG: 100 TABLET ORAL at 21:08

## 2024-05-07 RX ADMIN — CYANOCOBALAMIN TAB 1000 MCG 1000 MCG: 1000 TAB at 08:14

## 2024-05-07 RX ADMIN — SENNOSIDES AND DOCUSATE SODIUM 2 TABLET: 8.6; 5 TABLET ORAL at 08:14

## 2024-05-07 RX ADMIN — NICOTINE POLACRILEX 2 MG: 2 GUM, CHEWING ORAL at 17:41

## 2024-05-07 RX ADMIN — ERGOCALCIFEROL 50000 UNITS: 1.25 CAPSULE, LIQUID FILLED ORAL at 08:13

## 2024-05-07 RX ADMIN — ESCITALOPRAM OXALATE 20 MG: 10 TABLET, FILM COATED ORAL at 08:13

## 2024-05-07 RX ADMIN — NICOTINE POLACRILEX 2 MG: 2 GUM, CHEWING ORAL at 13:00

## 2024-05-07 RX ADMIN — SENNOSIDES AND DOCUSATE SODIUM 2 TABLET: 8.6; 5 TABLET ORAL at 17:19

## 2024-05-07 RX ADMIN — NICOTINE POLACRILEX 2 MG: 2 GUM, CHEWING ORAL at 08:13

## 2024-05-07 RX ADMIN — PROPRANOLOL HYDROCHLORIDE 10 MG: 10 TABLET ORAL at 21:07

## 2024-05-07 RX ADMIN — MELATONIN TAB 3 MG 3 MG: 3 TAB at 21:08

## 2024-05-07 RX ADMIN — AMLODIPINE BESYLATE 5 MG: 5 TABLET ORAL at 08:14

## 2024-05-07 RX ADMIN — POLYETHYLENE GLYCOL 3350 17 G: 17 POWDER, FOR SOLUTION ORAL at 08:12

## 2024-05-07 RX ADMIN — PROPRANOLOL HYDROCHLORIDE 10 MG: 10 TABLET ORAL at 08:13

## 2024-05-07 RX ADMIN — METFORMIN HYDROCHLORIDE 500 MG: 500 TABLET ORAL at 08:13

## 2024-05-07 RX ADMIN — RISPERIDONE 2 MG: 2 TABLET, FILM COATED ORAL at 21:08

## 2024-05-07 NOTE — SOCIAL WORK
Pt's sister Marleny called SW to inquire if pt is ok because she has not heard from him. SW provided pt line and [facility] phone number so sister can reach pt. SW provided brief update of pt's progress.

## 2024-05-07 NOTE — PLAN OF CARE
Problem: Alteration in Thoughts and Perception  Goal: Treatment Goal: Gain control of psychotic behaviors/thinking, reduce/eliminate presenting symptoms and demonstrate improved reality functioning upon discharge  Outcome: Progressing  Goal: Verbalize thoughts and feelings  Description: Interventions:  - Promote a nonjudgmental and trusting relationship with the patient through active listening and therapeutic communication  - Assess patient's level of functioning, behavior and potential for risk  - Engage patient in 1 on 1 interactions  - Encourage patient to express fears, feelings, frustrations, and discuss symptoms    - [place] patient to reality, help patient recognize reality-based thinking   - Administer medications as ordered and assess for potential side effects  - Provide the patient education related to the signs and symptoms of the illness and desired effects of prescribed medications  Outcome: Progressing  Goal: Refrain from acting on delusional thinking/internal stimuli  Description: Interventions:  - Monitor patient closely, per order   - Utilize least restrictive measures   - Set reasonable limits, give positive feedback for acceptable   - Administer medications as ordered and monitor of potential side effects  Outcome: Progressing  Goal: Agree to be compliant with medication regime, as prescribed and report medication side effects  Description: Interventions:  - Offer appropriate PRN medication and supervise ingestion; conduct AIMS, as needed   Outcome: Progressing  Goal: Attend and participate in unit activities, including therapeutic, recreational, and educational groups  Description: Interventions:  -Encourage Visitation and family involvement in care  Outcome: Progressing  Goal: Recognize dysfunctional thoughts, communicate reality-based thoughts at the time of discharge  Description: Interventions:  - Provide medication and psycho-education to assist patient in compliance and developing  insight into his/her illness   Outcome: Progressing  Goal: Complete daily ADLs, including personal hygiene independently, as able  Description: Interventions:  - Observe, teach, and assist patient with ADLS  - Monitor and promote a balance of rest/activity, with adequate nutrition and elimination   Outcome: Progressing

## 2024-05-07 NOTE — NURSING NOTE
"Alberto has been awake, alert, and visible intermittently out in the milieu.  Pt ate 100% supper.  Pt social with select peers.  Pleasant, polite, and cooperative.  Affect blunted.  Pt rated his depression a #4/10 and anxiety a #2/4.  When asked about hearing voices, pt stated, \"I hear them every day, all day, and they say that they are gonna kill me and my family.\"  Pt has not verbalized any delusions.  Pt spends time resting in room and socializing with peers in lounge and dining room.  Pt attended and participated in coping skills group, evening nursing group, wrap up group, and had snack after with peers.  Pt requested prn Nicotine gum and 2 mg po given at 1741.  Pt cooperative with taking scheduled medications as ordered and without incident.  Pt maintained on continuous safety checks.  Will continue to monitor/assess for any changes in behavior.  "

## 2024-05-07 NOTE — PROGRESS NOTES
05/07/24 0752   Team Meeting   Meeting Type Daily Rounds   Team Members Present   Team Members Present Physician;Nurse;;Other (Discipline and Name)   Physician Team Member Thomas, Holter, Hangey CRNP   Nursing Team Member Claudia   Social Work Team Member Jose J   Other (Discipline and Name) Jeremias MS   Patient/Family Present   Patient Present No     Groups Participation  9/9.   Patient's compliant with medications. He's appropriate and engages in treatment. Responding AH. He interacts with his peers.

## 2024-05-07 NOTE — PROGRESS NOTES
"Progress Note - Behavioral Health   Alberto Berumen 28 y.o. male MRN: [id number]  Unit/Bed#: [facility] 101-02 Encounter: [id number]    Assessment/Plan   Principal Problem:    Schizoaffective disorder, bipolar type ([facility])  Active Problems:    GERD (gastroesophageal reflux disease)    Medical clearance for psychiatric admission    Tobacco abuse    T wave inversion in EKG    Chronic idiopathic constipation    Confluent and reticulate papillomatosis    Primary hypertension    Elevated hemoglobin A1c      Behavior over the last 24 hours:  improved  Sleep: normal  Appetite: normal  Medication side effects: No  ROS: no complaints    Subjective: Patient is a 28 year old male, with schizoaffective disorder, bipolar type. He attended 9/9 groups. He reports auditory hallucinations are constant, and tell him they will kill him and his family. He notes that he is trying to stay out of his head, and that ECT helps with this. Staff reports he is visible on the unit and has been playing games with select peers. He is medication compliant. He states that he feels \"a little bit\" depressed and stressed, but will not elaborate why -- denies specific stressors. Denies SI, HI. He states that he slept well, and did not have any nightmares. He notes that his day yesterday was \"pretty good.\" He was found in bed asleep but was easily woken up and cooperative to converse with this writer.    Assessment: Patient is slightly improving with use of ECT, he is medication compliant, and is cooperative and present in group therapy. The auditory hallucinations remain present.    Mental Status Evaluation:  Appearance:  age appropriate, casually dressed, and appears well-groomed. He is wearing hospital attire -- teal loose pants, light blue crocs, and a grey patient gown.  He has black hair in dreads   Behavior:  psychomotor retardation, cooperative, pleasant, calm, joyful, and makes good eye contact. He is hunched over, and occasionally dozes off and has " "to ask for the question to be repeated   Speech:  increased latency of response. Normal volume, normal pitch, normal tone, no speech impediments.    Mood:  \"Stable\"   Affect:  blunted and mood-congruent.    Thought Process:  goal directed and logical   Associations: intact associations   Thought Content:  delusions  persecutory -- he is paranoid of the voices due to their threats to kill him and his family. Denies any experiences leading to grandiose, somatic, or bizarre delusions. Denies SI, HI, phobias, obsessions, compulsions, or distorted body perceptions. He does not appear to be hypervigilant.    Perceptual Disturbances: Auditory hallucinations without commands. He notes there are multiple voices that tell him they will kill him and his family. He states the voices are constant, although he is intermittently able to \"get out of his head.\" Denies visual, gustatory, olfactory, or tactile hallucinations. Denies feelings of depersonalization, derealization, or illusions. Does not appear to be responding to internal stimuli.    Risk Potential: Suicidal Ideations none  Homicidal Ideations none  Potential for Aggression No   Sensorium:  person, place, time/date, situation, and day of week   Memory:  recent memory intact, remote memory intact, immediate memory intact  Immediate memory: able to recall 3 unrelated words (table, bravery, green)  Recent memory: expressed what he had for dinner last night; able to recall the current [place] president; made 1 error of the 3 unrelated words.   Remote memory: able to recall the name of the elementary school he attended   Consciousness:  alert    Attention: attention span and concentration were age appropriate  Attention span: correctly repeated a phone number  Concentration: able to perform serial 7's to 79   Intelligence: Fund of general knowledge: incorrectly expressed how many [place] states there are  Calculation ability: able to perform simple multiplication problems such as (9x5; " "7x2; 8x3; 8x4)  Problem solving: able to express why as a paper clip would move toward a magnet  Abstract interpretation: able to express similarity between guitar and violin (\"both have strings\")  Estimate of intelligence: average   Insight:  age appropriate: he is aware of his diagnosis and the need for therapy   Judgment: age appropriate: he states that he would bring a wallet to the owner if he found it on the floor of a grocery store  Social judgment: he is appropriately dressed and is appropriately acting   Impulse control: Does not appear to have poor impulse control    Gait/Station: normal gait/station and slow   Motor Activity: no abnormal movements     Progress Toward Goals: He is slightly improving with his goals. He is able to get out of his head with the use of ECT to manage the auditory hallucinations. However, the voices are still present with the same threats and he feels paranoid of the voices. He is more present on the unit, and participatory in group.     Recommended Treatment: Continue with group therapy, milieu therapy and occupational therapy.      Discharge plan: Plan is to discharge home to his aunt's house with an ACT team in place to ensure he is compliant with his medications and to help with transition into the community.     Risks, benefits and possible side effects of Medications:   Risks, benefits, and possible side effects of medications explained to patient and patient verbalizes understanding.      Medications: all current active meds have been reviewed.    Labs: I have personally reviewed all pertinent laboratory/tests results.     Counseling / Coordination of Care  Total floor / unit time spent today 20 minutes. Greater than 50% of total time was spent with the patient and / or family counseling and / or coordination of care.   "

## 2024-05-07 NOTE — SOCIAL WORK
SW checked in with pt.   Pt reports no current concerns other than voices ongoing. Pt reports he's dealing with the voices ok. Pt observed to be more visible, social and engaged with peers.  Pt reports he's not spoken with his family since the visit and will touch base with them this week to determine a time he could meet with them again. SW encouraged pt to engage with family and to continue his treatment.

## 2024-05-07 NOTE — NURSING NOTE
Patient is social with select peers, attending groups and out for meals. Pt reports no s/s today, voices no concerns. Pt takes all medications with issue.

## 2024-05-08 PROCEDURE — 99232 SBSQ HOSP IP/OBS MODERATE 35: CPT | Performed by: PSYCHIATRY & NEUROLOGY

## 2024-05-08 RX ADMIN — METFORMIN HYDROCHLORIDE 500 MG: 500 TABLET ORAL at 08:36

## 2024-05-08 RX ADMIN — PROPRANOLOL HYDROCHLORIDE 10 MG: 10 TABLET ORAL at 21:16

## 2024-05-08 RX ADMIN — NICOTINE POLACRILEX 2 MG: 2 GUM, CHEWING ORAL at 08:36

## 2024-05-08 RX ADMIN — SENNOSIDES AND DOCUSATE SODIUM 2 TABLET: 8.6; 5 TABLET ORAL at 17:20

## 2024-05-08 RX ADMIN — POLYETHYLENE GLYCOL 3350 17 G: 17 POWDER, FOR SOLUTION ORAL at 08:36

## 2024-05-08 RX ADMIN — PROPRANOLOL HYDROCHLORIDE 10 MG: 10 TABLET ORAL at 08:36

## 2024-05-08 RX ADMIN — Medication 5 DROP: at 17:20

## 2024-05-08 RX ADMIN — CLOZAPINE 500 MG: 100 TABLET ORAL at 21:15

## 2024-05-08 RX ADMIN — RISPERIDONE 2 MG: 2 TABLET, FILM COATED ORAL at 21:16

## 2024-05-08 RX ADMIN — MELATONIN TAB 3 MG 3 MG: 3 TAB at 21:16

## 2024-05-08 RX ADMIN — CYANOCOBALAMIN TAB 1000 MCG 1000 MCG: 1000 TAB at 08:36

## 2024-05-08 RX ADMIN — AMLODIPINE BESYLATE 5 MG: 5 TABLET ORAL at 08:36

## 2024-05-08 RX ADMIN — ESCITALOPRAM OXALATE 20 MG: 10 TABLET, FILM COATED ORAL at 08:36

## 2024-05-08 RX ADMIN — SENNOSIDES AND DOCUSATE SODIUM 2 TABLET: 8.6; 5 TABLET ORAL at 08:36

## 2024-05-08 RX ADMIN — NICOTINE POLACRILEX 2 MG: 2 GUM, CHEWING ORAL at 17:20

## 2024-05-08 RX ADMIN — NICOTINE POLACRILEX 2 MG: 2 GUM, CHEWING ORAL at 13:06

## 2024-05-08 NOTE — PLAN OF CARE
Problem: Ineffective Coping  Goal: Identifies ineffective coping skills  Outcome: Progressing  Goal: Identifies healthy coping skills  Outcome: Progressing  Goal: Demonstrates healthy coping skills  Outcome: Progressing  Goal: Participates in unit activities  Description: Interventions:  - Provide therapeutic environment   - Provide required programming   - Redirect inappropriate behaviors   Outcome: Progressing   Alberto attended 17/18 groups offered during the past 2 days.

## 2024-05-08 NOTE — NURSING NOTE
"Pt is accepting of medications without incidence and meal compliant. Pt is polite, pleasant and brightens on approach. Pt is social with peers and attends groups. Pt remains reporting AH as \"the same, but I'm fine\". No new concerns.   "

## 2024-05-08 NOTE — NURSING NOTE
Pt is visible on the unit and social with select peers. Consumed 100% of dinner. Took medications without incidence. Pt is pleasant and cooperative. Attended most groups. Reports the same AH that threaten to kill his family and him and denies other psych symptoms. Pt reports feeling safe on the unit. No behavioral issues. Pt offers no concerns or complaints. VSS. Continuous safety checks maintained.

## 2024-05-08 NOTE — PROGRESS NOTES
"Progress Note - Behavioral Health   Alberto Berumen 28 y.o. male MRN: [id number]  Unit/Bed#: [facility] 101-02 Encounter: [id number]    Assessment/Plan   Principal Problem:    Schizoaffective disorder, bipolar type ([facility])  Active Problems:    GERD (gastroesophageal reflux disease)    Medical clearance for psychiatric admission    Tobacco abuse    T wave inversion in EKG    Chronic idiopathic constipation    Confluent and reticulate papillomatosis    Primary hypertension    Elevated hemoglobin A1c      Behavior over the last 24 hours:  improved  Sleep: normal  Appetite: normal  Medication side effects: No  ROS: no complaints    Subjective: Patient is a 28 year old male, with schizoaffective disorder, bipolar type. He attended 8/9 groups yesterday. Nursing notes he appears bright on approach, pleasant, and social with peers; notes that he shared with the evening staff that he hears the voices \"all day, every day.\" He rates his anxiety a 2/4 and his depression a 4/10. He expresses that he feels \"a little bit depressed,\" denies specific stressor. Denies SI, or thoughts of self-harm. He states that ECT helps him to \"get out of his head\" and helps partially with his feelings of paranoia toward the voices. He reports that he feels his memory improves throughout the week after receiving ECT on Fridays. He notes that he had \"a good day\" yesterday and that he enjoyed group therapy. He reports a bowel movement today. He reports that he slept well, and did not have any nightmares.     Assessment: He has received 18 ECT treatments so far. He is slightly improving with use of ECT. He is medication compliant, and cooperative in group therapy. His auditory hallucinations are constant, and he feels paranoid toward them.    Mental Status Evaluation:  Appearance:  age appropriate, casually dressed, and well-groomed. He is wearing a black sweatshirt with the restrepo up, a grey patient gown, teal loose pants, and light blue crocs.    Behavior:  " "psychomotor retardation. Cooperative, pleasant, joyful, makes good eye contact.   Speech:  increased latency of response. Normal volume, normal pitch, normal tone. No speech impediment   Mood:  \"Stable\"   Affect:  blunted and mood-congruent. Stable affect, appropriate affect to thought content.    Thought Process:  goal directed and logical   Associations: intact associations   Thought Content:  delusions  persecutory. He expresses paranoia toward the auditory hallucinations. Denies any experiences leading to grandiose, somatic, or bizarre delusions. Denies SI, HI, phobias, obsessions, compulsions, or distorted body perceptions. Does not appear to be hypervigilant.    Perceptual Disturbances: Auditory hallucinations without commands. He reports hearing multiple voices constantly, telling him they will kill him and his family. Denies visual, tactile, gustatory, or olfactory hallucinations. Denies feelings of depersonalization, derealization, or illusions. Does not appear to be responding to internal stimuli.   Risk Potential: Suicidal Ideations without plan  Homicidal Ideations without plan  Potential for Aggression No   Sensorium:  person, place, time/date, situation, and day of week   Memory:  recent memory intact, remote memory intact, immediate memory intact  Immediate memory: able to repeat 3 unrelated words without error (house, ousmane, brown)  Recent memory: increased latency of response but eventually is able to recall what he had for dinner last night; able to recall the 3 unrelated words with some guidance 5 minutes later; is unaware of who the current   is   Remote memory: able to recall the name of his middle school    Consciousness:  alert    Attention: attention span and concentration were age appropriate  Attention: able to repeat a phone number without error  Concentration: able to spell \"[employer]\" forward and backward with one missing letter while spelling backward   Intelligence:  Fund of " "general knowledge: He was unaware of the two countries [place] the [place]; he was able to answer what state is [place].   Calculation ability: able to calculate simple multiplication problems without error; 7x3,7x4,7x5,7x6)  Abstract interpretation: able to express similarity between an elevators and a staircase (\"both can go up or down\")  Problem solving: able to express why the moon appears larger than the stars  Estimate of intelligence: average   Insight:  age appropriate: he is aware of his diagnosis and the need for treatment   Judgment: age appropriate; he expresses that he would send out an addressed and stamped envelope that he found on the street; he is dressed appropriately and acts appropriately   Impulse control: Does not appear to have poor impulse control   Gait/Station: normal gait/station and slow   Motor Activity: no abnormal movements     Progress Toward Goals: He is improving toward his goals; he expresses benefit with ECT of his auditory hallucinations and paranoid delusions. He does have auditory hallucinations constantly.     Recommended Treatment: Continue with group therapy, milieu therapy and occupational therapy.      Discharge plan: Plan is to discharge home to his aunt with ACT team in place to ensure he is taking his medications and to assist with transitioning into the community.     Risks, benefits and possible side effects of Medications:   Risks, benefits, and possible side effects of medications explained to patient and patient verbalizes understanding.      Medications: all current active meds have been reviewed.    Labs: I have personally reviewed all pertinent laboratory/tests results.     Counseling / Coordination of Care  Total floor / unit time spent today 20 minutes. Greater than 50% of total time was spent with the patient and / or family counseling and / or coordination of care.   "

## 2024-05-08 NOTE — PLAN OF CARE
Problem: Alteration in Thoughts and Perception  Goal: Verbalize thoughts and feelings  Description: Interventions:  - Promote a nonjudgmental and trusting relationship with the patient through active listening and therapeutic communication  - Assess patient's level of functioning, behavior and potential for risk  - Engage patient in 1 on 1 interactions  - Encourage patient to express fears, feelings, frustrations, and discuss symptoms    - [place] patient to reality, help patient recognize reality-based thinking   - Administer medications as ordered and assess for potential side effects  - Provide the patient education related to the signs and symptoms of the illness and desired effects of prescribed medications  Outcome: Not Progressing  Goal: Refrain from acting on delusional thinking/internal stimuli  Description: Interventions:  - Monitor patient closely, per order   - Utilize least restrictive measures   - Set reasonable limits, give positive feedback for acceptable   - Administer medications as ordered and monitor of potential side effects  Outcome: Not Progressing  Goal: Agree to be compliant with medication regime, as prescribed and report medication side effects  Description: Interventions:  - Offer appropriate PRN medication and supervise ingestion; conduct AIMS, as needed   Outcome: Not Progressing  Goal: Attend and participate in unit activities, including therapeutic, recreational, and educational groups  Description: Interventions:  -Encourage Visitation and family involvement in care  Outcome: Not Progressing  Goal: Recognize dysfunctional thoughts, communicate reality-based thoughts at the time of discharge  Description: Interventions:  - Provide medication and psycho-education to assist patient in compliance and developing insight into his/her illness   Outcome: Not Progressing  Goal: Complete daily ADLs, including personal hygiene independently, as able  Description: Interventions:  - Observe,  teach, and assist patient with ADLS  - Monitor and promote a balance of rest/activity, with adequate nutrition and elimination   Outcome: Not Progressing     Problem: Ineffective Coping  Goal: Identifies ineffective coping skills  Outcome: Not Progressing  Goal: Identifies healthy coping skills  Outcome: Not Progressing  Goal: Demonstrates healthy coping skills  Outcome: Not Progressing  Goal: Participates in unit activities  Description: Interventions:  - Provide therapeutic environment   - Provide required programming   - Redirect inappropriate behaviors   Outcome: Not Progressing  Goal: Patient/Family participate in treatment and DC plans  Description: Interventions:  - Provide therapeutic environment  Outcome: Not Progressing  Goal: Patient/Family verbalizes awareness of resources  Outcome: Not Progressing     Problem: Depression  Goal: Verbalize thoughts and feelings  Description: Interventions:  - Assess and re-assess patient's level of risk   - Engage patient in 1:1 interactions, daily, for a minimum of 15 minutes   - Encourage patient to express feelings, fears, frustrations, hopes   Outcome: Not Progressing  Goal: Refrain from harming self  Description: Interventions:  - Monitor patient closely, per order   - Supervise medication ingestion, monitor effects and side effects   Outcome: Not Progressing  Goal: Refrain from isolation  Description: Interventions:  - Develop a trusting relationship   - Encourage socialization   Outcome: Not Progressing  Goal: Attend and participate in unit activities, including therapeutic, recreational, and educational groups  Description: Interventions:  - Provide therapeutic and educational activities daily, encourage attendance and participation, and document same in the medical record   Outcome: Not Progressing  Goal: Complete daily ADLs, including personal hygiene independently, as able  Description: Interventions:  - Observe, teach, and assist patient with ADLS  -  Monitor  and promote a balance of rest/activity, with adequate nutrition and elimination   Outcome: Not Progressing     Problem: Anxiety  Goal: Anxiety is at manageable level  Description: Interventions:  - Assess and monitor patient's anxiety level.   - Monitor for signs and symptoms (heart palpitations, chest pain, shortness of breath, headaches, nausea, feeling jumpy, restlessness, irritable, apprehensive).   - Collaborate with interdisciplinary team and initiate plan and interventions as ordered.  - [place] patient to unit/surroundings  - Explain treatment plan  - Encourage participation in care  - Encourage verbalization of concerns/fears  - Identify coping mechanisms  - Assist in developing anxiety-reducing skills  - Administer/offer alternative therapies  - Limit or eliminate stimulants  Outcome: Not Progressing     Problem: Alteration in Orientation  Goal: Interact with staff daily  Description: Interventions:  - Assess and re-assess patient's level of orientation  - Engage patient in 1 on 1 interactions, daily, for a minimum of 15 minutes   - Establish rapport/trust with patient   Outcome: Not Progressing  Goal: Express concerns related to confused thinking related to:  Description: Interventions:  - Encourage patient to express feelings, fears, frustrations, hopes  - Assign consistent caregivers   - [place]/re-orient patient as needed  - Allow comfort items, as appropriate  - Provide visual cues, signs, etc.   Outcome: Not Progressing  Goal: Allow medical examinations, as recommended  Description: Interventions:  - Provide physical/neurological exams and/or referrals, per provider   Outcome: Not Progressing  Goal: Cooperate with recommended testing/procedures  Description: Interventions:  - Determine need for ancillary testing  - Observe for mental status changes  - Implement falls/precaution protocol   Outcome: Not Progressing  Goal: Attend and participate in unit activities, including therapeutic, recreational, and  educational groups  Description: Interventions:  - Provide therapeutic and educational activities daily, encourage attendance and participation, and document same in the medical record   - Provide appropriate opportunities for reminiscence   - Provide a consistent daily routine   - Encourage family contact/visitation   Outcome: Not Progressing  Goal: Complete daily ADLs, including personal hygiene independently, as able  Description: Interventions:  - Observe, teach, and assist patient with ADLS  Outcome: Not Progressing     Problem: Individualized Interventions  Goal: Patient will verbalize appropriate use of telephone within 5 days  Description: Interventions:  - Treatment team to determine use of supervised phone privileges   Outcome: Not Progressing  Goal: Patient will verbalize need for hospitalization and will no longer attempt elopement within 5 days  Description: Interventions:  - Ongoing education to help patient understand need for hospitalization  Outcome: Not Progressing  Goal: Patient will recognize inappropriate behaviors and develop alternative behaviors within 5 days  Description: Interventions:  - Patient in collaboration with Treatment Team will develop a behavior management plan to help identify effective coping skills to deal with stressors  Outcome: Not Progressing     Problem: Electroconvulsive therapy (ECT)  Goal: Verbalizes/displays adequate comfort level or baseline comfort level  Description: Interventions:  - Encourage patient to monitor pain and request assistance  - Assess pain using appropriate pain scale  - Administer analgesics based on type and severity of pain and evaluate response  - Implement non-pharmacological measures as appropriate and evaluate response  - Consider cultural and social influences on pain and pain management  - Notify physician/advanced practitioner if interventions unsuccessful or patient reports new pain  Outcome: Not Progressing  Goal: Achieves stable or  improved neurological status  Description: INTERVENTIONS  - Monitor and report changes in neurological status  - Monitor vital signs such as temperature, blood pressure, glucose, and any other labs ordered   - Initiate measures to prevent increased intracranial pressure  - Monitor for seizure activity and implement precautions if appropriate      Outcome: Not Progressing  Goal: Achieves maximal functionality and self care  Description: INTERVENTIONS  - Monitor swallowing and airway patency with patient fatigue and changes in neurological status  - Encourage and assist patient to increase activity and self care.   - Encourage visually impaired, hearing impaired and aphasic patients to use assistive/communication devices  Outcome: Not Progressing  Goal: Maintain or return mobility to safest level of function  Description: INTERVENTIONS:  - Assess patient's ability to carry out ADLs; assess patient's baseline for ADL function and identify physical deficits which impact ability to perform ADLs (bathing, care of mouth/teeth, toileting, grooming, dressing, etc.)  - Assess/evaluate cause of self-care deficits   - Assess range of motion  - Assess patient's mobility  - Assess patient's need for assistive devices and provide as appropriate  - Encourage maximum independence but intervene and supervise when necessary  - Involve family in performance of ADLs  - Assess for home care needs following discharge   - Consider OT consult to assist with ADL evaluation and planning for discharge  - Provide patient education as appropriate  Outcome: Not Progressing  Goal: Absence of urinary retention  Description: INTERVENTIONS:  - Assess patient’s ability to void and empty bladder  - Monitor I/O  - Bladder scan as needed  - Discuss with physician/AP medications to alleviate retention as needed  - Discuss catheterization for long term situations as appropriate  Outcome: Not Progressing  Goal: Minimal or absence of nausea and/or  vomiting  Description: INTERVENTIONS:  - Administer IV fluids if ordered to ensure adequate hydration  - Maintain NPO status until nausea and vomiting are resolved  - Nasogastric tube if ordered  - Administer ordered antiemetic medications as needed  - Provide nonpharmacologic comfort measures as appropriate  - Advance diet as tolerated, if ordered  - Consider nutrition services referral to assist patient with adequate nutrition and appropriate food choices  Outcome: Not Progressing  Goal: Maintains adequate nutritional intake  Description: INTERVENTIONS:  - Monitor percentage of each meal consumed  - Identify factors contributing to decreased intake, treat as appropriate  - Assist with meals as needed  - Monitor I&O, weight, and lab values if indicated  - Obtain nutrition services referral as needed  Outcome: Not Progressing     Problem: DISCHARGE PLANNING - CARE MANAGEMENT  Goal: Discharge to post-acute care or home with appropriate resources  Description: INTERVENTIONS:  - Conduct assessment to determine patient/family and health care team treatment goals, and need for post-acute services based on payer coverage, community resources, and patient preferences, and barriers to discharge  - Address psychosocial, clinical, and financial barriers to discharge as identified in assessment in conjunction with the patient/family and health care team  - Arrange appropriate level of post-acute services according to patient’s   needs and preference and payer coverage in collaboration with the physician and health care team  - Communicate with and update the patient/family, physician, and health care team regarding progress on the discharge plan  - Arrange appropriate transportation to post-acute venues  Outcome: Not Progressing

## 2024-05-08 NOTE — PROGRESS NOTES
05/08/24 0700   Team Meeting   Meeting Type Daily Rounds   Team Members Present   Team Members Present Physician;Nurse;;Other (Discipline and Name)   Patient/Family Present   Patient Present No   Patient's Family Present No     In attendance:  Dr. Alex Thomas, MD Dr. Jordan Holter, DO Eveline Hunt, HOLLI Taylor, RN  Luisana Alvarado, [facility]  Carla Lux, W  MATILDA Daniel.S.    Groups: 8/9    Pt has been bright and pleasant, engaged and social with select peers. Pt reports ongoing auditory hallucinations and ongoing depression and anxiety. No bx concerns at this time.

## 2024-05-09 VITALS
OXYGEN SATURATION: 100 % | TEMPERATURE: 98.2 F | HEART RATE: 101 BPM | RESPIRATION RATE: 18 BRPM | BODY MASS INDEX: 41.14 KG/M2 | SYSTOLIC BLOOD PRESSURE: 161 MMHG | WEIGHT: 256 LBS | HEIGHT: 66 IN | DIASTOLIC BLOOD PRESSURE: 91 MMHG

## 2024-05-09 PROCEDURE — 99232 SBSQ HOSP IP/OBS MODERATE 35: CPT | Performed by: PSYCHIATRY & NEUROLOGY

## 2024-05-09 RX ADMIN — METFORMIN HYDROCHLORIDE 500 MG: 500 TABLET ORAL at 08:46

## 2024-05-09 RX ADMIN — NICOTINE POLACRILEX 2 MG: 2 GUM, CHEWING ORAL at 12:49

## 2024-05-09 RX ADMIN — CYANOCOBALAMIN TAB 1000 MCG 1000 MCG: 1000 TAB at 08:46

## 2024-05-09 RX ADMIN — NICOTINE POLACRILEX 2 MG: 2 GUM, CHEWING ORAL at 17:52

## 2024-05-09 RX ADMIN — SENNOSIDES AND DOCUSATE SODIUM 2 TABLET: 8.6; 5 TABLET ORAL at 17:28

## 2024-05-09 RX ADMIN — AMLODIPINE BESYLATE 5 MG: 5 TABLET ORAL at 08:46

## 2024-05-09 RX ADMIN — CLOZAPINE 500 MG: 100 TABLET ORAL at 21:13

## 2024-05-09 RX ADMIN — MELATONIN TAB 3 MG 3 MG: 3 TAB at 21:13

## 2024-05-09 RX ADMIN — ESCITALOPRAM OXALATE 20 MG: 10 TABLET, FILM COATED ORAL at 08:46

## 2024-05-09 RX ADMIN — SENNOSIDES AND DOCUSATE SODIUM 2 TABLET: 8.6; 5 TABLET ORAL at 08:46

## 2024-05-09 RX ADMIN — RISPERIDONE 2 MG: 2 TABLET, FILM COATED ORAL at 21:13

## 2024-05-09 RX ADMIN — Medication 5 DROP: at 21:12

## 2024-05-09 RX ADMIN — PROPRANOLOL HYDROCHLORIDE 10 MG: 10 TABLET ORAL at 08:46

## 2024-05-09 RX ADMIN — NICOTINE POLACRILEX 2 MG: 2 GUM, CHEWING ORAL at 08:46

## 2024-05-09 RX ADMIN — PROPRANOLOL HYDROCHLORIDE 10 MG: 10 TABLET ORAL at 21:12

## 2024-05-09 RX ADMIN — POLYETHYLENE GLYCOL 3350 17 G: 17 POWDER, FOR SOLUTION ORAL at 08:45

## 2024-05-09 NOTE — PROGRESS NOTES
Progress Note - Behavioral Health   Alberto Berumen 28 y.o. male MRN: [id number]  Unit/Bed#: [facility] 101-02 Encounter: [id number]    Assessment/Plan   Principal Problem:    Schizoaffective disorder, bipolar type ([facility])  Active Problems:    GERD (gastroesophageal reflux disease)    Medical clearance for psychiatric admission    Tobacco abuse    T wave inversion in EKG    Chronic idiopathic constipation    Confluent and reticulate papillomatosis    Primary hypertension    Elevated hemoglobin A1c      Behavior over the last 24 hours:  improved  Sleep: normal  Appetite: normal  Medication side effects: No  ROS: notes ear wax buildup bilaterally; is now receiving debrox 2x daily. No other complaints    Subjective: Patient is a 28 year old male, with schizoaffective disorder, bipolar type. He attended 8/9 groups yesterday. Nursing notes he was visible on the unit during day shift, social with select peers, and cooperative in group. Evening shift, he was noted to be more reserved. He has ECT scheduled for tomorrow. He states that the voices telling him that they will kill him and his family are constant, though he still feels he is able to get out of his head due to ECT. He denies feeling paranoid of the voices. He states that he feels slightly depressed; denies SI, feelings of guilt, hopelessness, difficulty with concentration. Denies feelings of anxiety, or stress. He notes that he slept well, denies having nightmares. He is medication-compliant.     Assessment: He is less isolative, more social and present on the unit. He is medication compliant and aware of the need for group therapy. He notes benefit with ECT, states that the auditory hallucinations are present constantly.     Mental Status Evaluation:  Appearance:  age appropriate and casually dressed. He is wearing a black hooded sweatshirt, a grey patient gown, teal loose pants, with light blue crocs. He has black hair in dreads. He is well-groomed.   Behavior:   "psychomotor retardation. He is cooperative, pleasant, joyful, and calm. He makes good eye contact   Speech:  increased latency of response. Normal volume, normal tone, normal pitch. No speech impediments.    Mood:  \"Okay\"   Affect:  mood-congruent. His affect is stable, affect is appropriate to thought content.    Thought Process:  goal directed and logical   Associations: intact associations   Thought Content:  normal. He denies feeling paranoid toward the auditory hallucinations. Denies any experiences leading to persecutory, grandiose, somatic, or bizarre delusions. Denies SI, HI, phobias, obsessions, compulsions, or distorted body perceptions. Does not appear to be hypervigilant.    Perceptual Disturbances: Auditory hallucinations without commands. He reports hearing multiple voices constantly expressing that they will kill him and his family. Denies visual, tactile, gustatory, or olfactory hallucinations. Denies feelings of depersonalization, derealization, or illusions. Does not appear to be responding to internal stimuli.    Risk Potential: Suicidal Ideations none  Homicidal Ideations none  Potential for Aggression No   Sensorium:  person, place, time/date, situation, and day of week   Memory:  recent memory intact, remote memory intact, immediate memory intact  Immediate memory: able to recall 3 unrelated words (door, purple, honesty)  Recent memory: able to recall what he had for dinner last night; unable to recall the 3 unrelated words despite guidance; aware of who the current [place] president is  Remote memory: able to recall the name of his    Consciousness:  alert    Intelligence: Fund of general knowledge: able to recall the capital of [place]  Abstract interpretation: able to express similarity between boot and sneaker (\"both on your feet, both you put on to walk\"  Problem solving: able to relay why the moon appears bigger than the stars  Calculation ability: able to calculate the change " "from a $5 bill with a $1.50 purchase   Estimate of intelligence: average   Attention: attention span and concentration were age appropriate  Attention: able to repeat a phone number without error  Concentration: able to spell \"table\" back and forth    Insight:  age appropriate: he is aware of his diagnosis and the need for treatment and medication   Judgment: age appropriate: he appropriately expresses what he would do if he heard someone yell fire in a theatre; he is dressed appropriately and acts appropriately   Impulse control: Does not appear to have poor impulse control   Gait/Station: normal gait/station and slow   Motor Activity: no abnormal movements     Progress Toward Goals: He is improving with his goals; he attends more than 50% of group therapy daily on average, and is medication compliant. He reports benefit with use of ECT; although he reports the auditory hallucinations remain constant.    Recommended Treatment: Continue with group therapy, milieu therapy and occupational therapy.      Discharge plan: plan is to discharge to his aunt's home with an ACT team in place to assist with medication compliance, and to aid in transitioning into the community.    Risks, benefits and possible side effects of Medications:   Risks, benefits, and possible side effects of medications explained to patient and patient verbalizes understanding.      Medications: all current active meds have been reviewed.    Labs: I have personally reviewed all pertinent laboratory/tests results.     Counseling / Coordination of Care  Total floor / unit time spent today 20 minutes. Greater than 50% of total time was spent with the patient and / or family counseling and / or coordination of care.   "

## 2024-05-09 NOTE — NURSING NOTE
"Alberto has been awake, alert, and visible intermittently out in the milieu.  Pt ate 100% supper.  Pt requested prn Nicotine gum and 2 mg po given at 1752.  Pt social with select peers.  Pt polite, and cooperative.  Affect flat, blunted.  Pt rated his depression a #4/10 and anxiety a #2/4.  Pt stated that he hears voices \"all the time\" and that \"they say that they are gonna kill me and my family\" but has not verbalized any delusions.  Pt spends time resting in room and playing cards with peers in dining room.  Pt attended and participated in evening coping skills group, wrap up group, and had snack after with peers.  Pt reminded that he has ECT tomorrow AM and that he will be NPO after midnight tonight.  Pt showered.  Pt cooperative with taking scheduled medications as ordered and without incident but refused scheduled 2100 Atropine sublingual drops.   Pt maintained on continuous safety checks.  Will continue to monitor/assess for any changes in behavior.  "

## 2024-05-09 NOTE — PLAN OF CARE
Problem: Alteration in Thoughts and Perception  Goal: Treatment Goal: Gain control of psychotic behaviors/thinking, reduce/eliminate presenting symptoms and demonstrate improved reality functioning upon discharge  Outcome: Progressing  Goal: Verbalize thoughts and feelings  Description: Interventions:  - Promote a nonjudgmental and trusting relationship with the patient through active listening and therapeutic communication  - Assess patient's level of functioning, behavior and potential for risk  - Engage patient in 1 on 1 interactions  - Encourage patient to express fears, feelings, frustrations, and discuss symptoms    - [place] patient to reality, help patient recognize reality-based thinking   - Administer medications as ordered and assess for potential side effects  - Provide the patient education related to the signs and symptoms of the illness and desired effects of prescribed medications  Outcome: Progressing  Goal: Agree to be compliant with medication regime, as prescribed and report medication side effects  Description: Interventions:  - Offer appropriate PRN medication and supervise ingestion; conduct AIMS, as needed   Outcome: Progressing  Goal: Attend and participate in unit activities, including therapeutic, recreational, and educational groups  Description: Interventions:  -Encourage Visitation and family involvement in care  Outcome: Progressing  Goal: Complete daily ADLs, including personal hygiene independently, as able  Description: Interventions:  - Observe, teach, and assist patient with ADLS  - Monitor and promote a balance of rest/activity, with adequate nutrition and elimination   Outcome: Progressing     Problem: Ineffective Coping  Goal: Participates in unit activities  Description: Interventions:  - Provide therapeutic environment   - Provide required programming   - Redirect inappropriate behaviors   Outcome: Progressing     Problem: Depression  Goal: Verbalize thoughts and  feelings  Description: Interventions:  - Assess and re-assess patient's level of risk   - Engage patient in 1:1 interactions, daily, for a minimum of 15 minutes   - Encourage patient to express feelings, fears, frustrations, hopes   Outcome: Progressing  Goal: Refrain from harming self  Description: Interventions:  - Monitor patient closely, per order   - Supervise medication ingestion, monitor effects and side effects   Outcome: Progressing  Goal: Refrain from isolation  Description: Interventions:  - Develop a trusting relationship   - Encourage socialization   Outcome: Progressing  Goal: Complete daily ADLs, including personal hygiene independently, as able  Description: Interventions:  - Observe, teach, and assist patient with ADLS  -  Monitor and promote a balance of rest/activity, with adequate nutrition and elimination   Outcome: Progressing     Problem: Anxiety  Goal: Anxiety is at manageable level  Description: Interventions:  - Assess and monitor patient's anxiety level.   - Monitor for signs and symptoms (heart palpitations, chest pain, shortness of breath, headaches, nausea, feeling jumpy, restlessness, irritable, apprehensive).   - Collaborate with interdisciplinary team and initiate plan and interventions as ordered.  - [place] patient to unit/surroundings  - Explain treatment plan  - Encourage participation in care  - Encourage verbalization of concerns/fears  - Identify coping mechanisms  - Assist in developing anxiety-reducing skills  - Administer/offer alternative therapies  - Limit or eliminate stimulants  Outcome: Progressing

## 2024-05-09 NOTE — NURSING NOTE
"Pt is accepting of medications without incidence, but declined ear drops and is meal compliant. Pt is visible in the milieu and social with select few peers. Pt is polite in conversation and pleasant. Pt attends groups and makes needs known. States he is \"tired\" and has AH of \"the same voices saying they'll hurt my family and me\". Pt otherwise denies s/s. No new concerns at this time.   "

## 2024-05-09 NOTE — PROGRESS NOTES
05/09/24 0734   Team Meeting   Meeting Type Daily Rounds   Team Members Present   Team Members Present Physician;Nurse;;Other (Discipline and Name)   Patient/Family Present   Patient Present No   Patient's Family Present No     In attendance:  Dr. Alex Thomas, MD Dr. Jordan Holter, DO Eveline Hunt, HOLLI Taylor, RN  Luisana Alvarado, [facility]  Carla Lux, W  MATILDA Daniel.S.    Groups: 8/9    Pt has been more social during the day and quiet but visible in the evenings. Debrox added 2x daily. No bx concerns. ECT scheduled for 5/10

## 2024-05-10 ENCOUNTER — ANESTHESIA (INPATIENT)
Dept: PREOP/PACU | Facility: HOSPITAL | Age: 29
DRG: 750 | End: 2024-05-10
Payer: COMMERCIAL

## 2024-05-10 ENCOUNTER — ANESTHESIA EVENT (INPATIENT)
Dept: PREOP/PACU | Facility: HOSPITAL | Age: 29
DRG: 750 | End: 2024-05-10
Payer: COMMERCIAL

## 2024-05-10 ENCOUNTER — APPOINTMENT (INPATIENT)
Dept: PREOP/PACU | Facility: HOSPITAL | Age: 29
DRG: 750 | End: 2024-05-10
Attending: PSYCHIATRY & NEUROLOGY
Payer: COMMERCIAL

## 2024-05-10 PROCEDURE — 90870 ELECTROCONVULSIVE THERAPY: CPT | Performed by: STUDENT IN AN ORGANIZED HEALTH CARE EDUCATION/TRAINING PROGRAM

## 2024-05-10 PROCEDURE — 99232 SBSQ HOSP IP/OBS MODERATE 35: CPT | Performed by: PSYCHIATRY & NEUROLOGY

## 2024-05-10 PROCEDURE — GZB2ZZZ ELECTROCONVULSIVE THERAPY, BILATERAL-SINGLE SEIZURE: ICD-10-PCS | Performed by: STUDENT IN AN ORGANIZED HEALTH CARE EDUCATION/TRAINING PROGRAM

## 2024-05-10 PROCEDURE — 90870 ELECTROCONVULSIVE THERAPY: CPT

## 2024-05-10 RX ORDER — ESMOLOL HYDROCHLORIDE 10 MG/ML
INJECTION INTRAVENOUS AS NEEDED
Status: DISCONTINUED | OUTPATIENT
Start: 2024-05-10 | End: 2024-05-10

## 2024-05-10 RX ORDER — SUCCINYLCHOLINE/SOD CL,ISO/PF 100 MG/5ML
SYRINGE (ML) INTRAVENOUS AS NEEDED
Status: DISCONTINUED | OUTPATIENT
Start: 2024-05-10 | End: 2024-05-10

## 2024-05-10 RX ORDER — KETOROLAC TROMETHAMINE 30 MG/ML
INJECTION, SOLUTION INTRAMUSCULAR; INTRAVENOUS AS NEEDED
Status: DISCONTINUED | OUTPATIENT
Start: 2024-05-10 | End: 2024-05-10

## 2024-05-10 RX ORDER — HYDRALAZINE HYDROCHLORIDE 20 MG/ML
INJECTION INTRAMUSCULAR; INTRAVENOUS AS NEEDED
Status: DISCONTINUED | OUTPATIENT
Start: 2024-05-10 | End: 2024-05-10

## 2024-05-10 RX ORDER — ETOMIDATE 2 MG/ML
INJECTION INTRAVENOUS AS NEEDED
Status: DISCONTINUED | OUTPATIENT
Start: 2024-05-10 | End: 2024-05-10

## 2024-05-10 RX ORDER — LABETALOL HYDROCHLORIDE 5 MG/ML
INJECTION, SOLUTION INTRAVENOUS AS NEEDED
Status: DISCONTINUED | OUTPATIENT
Start: 2024-05-10 | End: 2024-05-10

## 2024-05-10 RX ORDER — GLYCOPYRROLATE 0.2 MG/ML
INJECTION INTRAMUSCULAR; INTRAVENOUS AS NEEDED
Status: DISCONTINUED | OUTPATIENT
Start: 2024-05-10 | End: 2024-05-10

## 2024-05-10 RX ORDER — SODIUM CHLORIDE, SODIUM LACTATE, POTASSIUM CHLORIDE, CALCIUM CHLORIDE 600; 310; 30; 20 MG/100ML; MG/100ML; MG/100ML; MG/100ML
INJECTION, SOLUTION INTRAVENOUS CONTINUOUS PRN
Status: DISCONTINUED | OUTPATIENT
Start: 2024-05-10 | End: 2024-05-10

## 2024-05-10 RX ORDER — MIDAZOLAM HYDROCHLORIDE 2 MG/2ML
INJECTION, SOLUTION INTRAMUSCULAR; INTRAVENOUS AS NEEDED
Status: DISCONTINUED | OUTPATIENT
Start: 2024-05-10 | End: 2024-05-10

## 2024-05-10 RX ADMIN — LABETALOL HYDROCHLORIDE 20 MG: 5 INJECTION, SOLUTION INTRAVENOUS at 07:13

## 2024-05-10 RX ADMIN — CYANOCOBALAMIN TAB 1000 MCG 1000 MCG: 1000 TAB at 08:31

## 2024-05-10 RX ADMIN — PROPRANOLOL HYDROCHLORIDE 10 MG: 10 TABLET ORAL at 21:17

## 2024-05-10 RX ADMIN — SENNOSIDES AND DOCUSATE SODIUM 2 TABLET: 8.6; 5 TABLET ORAL at 17:09

## 2024-05-10 RX ADMIN — MELATONIN TAB 3 MG 3 MG: 3 TAB at 21:17

## 2024-05-10 RX ADMIN — Medication 140 MG: at 07:13

## 2024-05-10 RX ADMIN — Medication 5 DROP: at 21:59

## 2024-05-10 RX ADMIN — POLYETHYLENE GLYCOL 3350 17 G: 17 POWDER, FOR SOLUTION ORAL at 08:30

## 2024-05-10 RX ADMIN — NICOTINE POLACRILEX 2 MG: 2 GUM, CHEWING ORAL at 08:31

## 2024-05-10 RX ADMIN — CLOZAPINE 500 MG: 100 TABLET ORAL at 21:17

## 2024-05-10 RX ADMIN — AMLODIPINE BESYLATE 5 MG: 5 TABLET ORAL at 05:59

## 2024-05-10 RX ADMIN — SODIUM CHLORIDE, SODIUM LACTATE, POTASSIUM CHLORIDE, AND CALCIUM CHLORIDE: .6; .31; .03; .02 INJECTION, SOLUTION INTRAVENOUS at 06:37

## 2024-05-10 RX ADMIN — ATROPINE SULFATE 1 DROP: 10 SOLUTION/ DROPS OPHTHALMIC at 21:59

## 2024-05-10 RX ADMIN — KETOROLAC TROMETHAMINE 30 MG: 30 INJECTION, SOLUTION INTRAMUSCULAR; INTRAVENOUS at 07:09

## 2024-05-10 RX ADMIN — NICOTINE POLACRILEX 2 MG: 2 GUM, CHEWING ORAL at 17:09

## 2024-05-10 RX ADMIN — METFORMIN HYDROCHLORIDE 500 MG: 500 TABLET ORAL at 08:31

## 2024-05-10 RX ADMIN — GLYCOPYRROLATE 0.2 MG: 0.2 INJECTION INTRAMUSCULAR; INTRAVENOUS at 07:17

## 2024-05-10 RX ADMIN — GLYCOPYRROLATE 0.2 MG: 0.2 INJECTION INTRAMUSCULAR; INTRAVENOUS at 07:09

## 2024-05-10 RX ADMIN — SENNOSIDES AND DOCUSATE SODIUM 2 TABLET: 8.6; 5 TABLET ORAL at 08:30

## 2024-05-10 RX ADMIN — ETOMIDATE INJECTION 20 MG: 2 SOLUTION INTRAVENOUS at 07:13

## 2024-05-10 RX ADMIN — HYDRALAZINE HYDROCHLORIDE 10 MG: 20 INJECTION INTRAMUSCULAR; INTRAVENOUS at 07:09

## 2024-05-10 RX ADMIN — RISPERIDONE 2 MG: 2 TABLET, FILM COATED ORAL at 21:17

## 2024-05-10 RX ADMIN — Medication 50 MG: at 07:15

## 2024-05-10 RX ADMIN — ESCITALOPRAM OXALATE 20 MG: 10 TABLET, FILM COATED ORAL at 08:31

## 2024-05-10 RX ADMIN — HYDRALAZINE HYDROCHLORIDE 10 MG: 20 INJECTION INTRAMUSCULAR; INTRAVENOUS at 07:15

## 2024-05-10 RX ADMIN — MIDAZOLAM 2 MG: 1 INJECTION INTRAMUSCULAR; INTRAVENOUS at 07:17

## 2024-05-10 RX ADMIN — PROPRANOLOL HYDROCHLORIDE 10 MG: 10 TABLET ORAL at 05:59

## 2024-05-10 NOTE — PROGRESS NOTES
Progress Note - Behavioral Health     Alberto Berumen 28 y.o. male MRN: [id number]   Unit/Bed#: [facility] 101-02 Encounter: [id number]      Documentation, nursing notes, medication reconciliation, labs, and vitals reviewed. Patient was seen for continuing care and reviewed with treatment team. No acute events over the past 24 hours.  Per nursing report, polite and cooperative.  Intermittently isolative.  Continues to report auditory hallucinations the same.    No medication changes over the past 24 hours. Continues to tolerate current medications with no adverse effects.     On evaluation today, he is seen in sitting in the hallway.  Bright and pleasant and social with peers.  He reports auditory hallucinations continue the same as when I saw him last on 3P.  However, continues to report maintenance ECT has helped him to ignore the voices.  Feels some improvement in depression since the coming to this unit a little over a month ago.    Sleep: improved  Appetite: fair  Medication side effects: No   ROS: no complaints, all other systems are negative    Mental Status Evaluation:    Appearance:  marginal hygiene   Behavior:  cooperative   Speech:  slow   Mood:  depressed   Affect:  brighter   Thought Process:  decreased rate of thoughts   Associations: concrete associations   Thought Content:  persecutory delusions   Perceptual Disturbances: auditory hallucinations   Risk Potential: Suicidal ideation - None  Homicidal ideation - None  Potential for aggression - No   Sensorium:  oriented to person, place, and time/date   Memory:  recent and remote memory grossly intact   Consciousness:  alert and awake   Attention: decreased concentration and decreased attention span   Insight:  limited   Judgment: limited   Gait/Station: normal gait/station, normal balance   Motor Activity: no abnormal movements     Vital signs in last 24 hours:    Temp:  [98.1 °F (36.7 °C)-98.6 °F (37 °C)] 98.6 °F (37 °C)  HR:  [83-95] 83  Resp:  [18]  18  BP: (103-150)/(57-71) 130/60    Laboratory results: I have personally reviewed all pertinent laboratory/tests results.      Progress Toward Goals: continues to improve    Assessment/Plan   Principal Problem:    Schizoaffective disorder, bipolar type (HCC)  Active Problems:    GERD (gastroesophageal reflux disease)    Medical clearance for psychiatric admission    Tobacco abuse    T wave inversion in EKG    Chronic idiopathic constipation    Confluent and reticulate papillomatosis    Primary hypertension    Elevated hemoglobin A1c    Recommended Treatment:     Planned medication and treatment changes:    All current active medications have been reviewed  Encourage group therapy, milieu therapy and occupational therapy  Behavioral Health checks every 7 minutes  Continue ECT  ECT monthly for maintenance    Discharge plan: Plan is to discharge to his aunt's house with an ACT team in place to ensure he is treatment-compliant and to assist transitioning him into the community.        Requires continued inpatient treatment due to chronic illness and high risk of decompensation if discharged before long term stability is achieved.    Continue current medications:  Current Facility-Administered Medications   Medication Dose Route Frequency Provider Last Rate    acetaminophen  650 mg Oral Q4H PRN Jordan C Holter, DO      acetaminophen  650 mg Oral Q6H PRN HOLLI Lion      aluminum-magnesium hydroxide-simethicone  30 mL Oral Q4H PRN Jordan C Holter, DO      amLODIPine  5 mg Oral Daily HOLLI Lion      Artificial Tears  1 drop Both Eyes Q3H PRN Jordan C Holter, DO      atropine  1 drop Sublingual HS HOLLI Lion      atropine  1 drop Sublingual Daily PRN HOLLI Lion      haloperidol lactate  2.5 mg Intramuscular Q4H PRN Max 4/day HOLLI Lion      And    LORazepam  1 mg Intramuscular Q4H PRN Max 4/day HOLLI Lion      And    benztropine  0.5 mg Intramuscular Q4H PRN Max 4/day Eveline  HOLLI Hunt      benztropine  1 mg Intramuscular Q4H PRN Max 6/day [facility] Holter, DO      haloperidol lactate  5 mg Intramuscular Q4H PRN Max 4/day HOLLI Lion      And    LORazepam  2 mg Intramuscular Q4H PRN Max 4/day HOLLI Lion      And    benztropine  1 mg Intramuscular Q4H PRN Max 4/day HOLLI Lion      benztropine  1 mg Oral Q4H PRN Max 6/day HOLLI Lion      benztropine  1 mg Oral Q4H PRN Max 6/day [facility] Holter, DO      bisacodyl  10 mg Rectal Daily PRN HOLLI Lion      cloZAPine  500 mg Oral HS Bora Rosario MD      cyanocobalamin  1,000 mcg Oral Daily HOLLI Galvan      hydrOXYzine HCL  50 mg Oral Q6H PRN Max 4/day [facility] Cresencioter, DO      Or    diphenhydrAMINE  50 mg Intramuscular Q6H PRN [facility] Cresencioter, DO      hydrOXYzine HCL  50 mg Oral Q6H PRN Max 4/day HOLLI Lion      Or    diphenhydrAMINE  50 mg Intramuscular Q6H PRN HOLLI Lion      diphenhydrAMINE-zinc acetate   Topical BID PRN HOLLI Lion      ergocalciferol  50,000 Units Oral Weekly HOLLI Galvan      escitalopram  20 mg Oral Daily HOLLI Lion      haloperidol  1 mg Oral Q6H PRN HOLLI Lion      haloperidol  2.5 mg Oral Q4H PRN Max 4/day HOLLI Lion      haloperidol  5 mg Oral Q4H PRN Max 4/day HOLLI Lion      hydrocortisone   Topical 4x Daily PRN HOLLI Lion      hydrOXYzine HCL  100 mg Oral Q6H PRN Max 4/day [facility] Holter, DO      Or    LORazepam  2 mg Intramuscular Q6H PRN [facility] Holter, DO      hydrOXYzine HCL  100 mg Oral Q6H PRN Max 4/day HOLLI Lion      Or    LORazepam  2 mg Intramuscular Q6H PRN HOLLI Lion      hydrOXYzine HCL  25 mg Oral Q6H PRN Max 4/day [facility] Holter, DO      ibuprofen  600 mg Oral Q8H PRN HOLLI Lion      melatonin  3 mg Oral HS Jordan C Holter, DO      metFORMIN  500 mg Oral Daily With Breakfast HOLLI Lion      methocarbamol  500 mg Oral Q6H PRN Eveline  HOLLI Hunt      nicotine polacrilex  2 mg Oral Q4H PRN Bora Rosario MD      OLANZapine  5 mg Oral Q4H PRN Max 3/day [facility] Holter, DO      Or    OLANZapine  2.5 mg Intramuscular Q4H PRN Max 3/day [facility] Holter, DO      OLANZapine  5 mg Oral Q3H PRN Max 3/day [facility] Holter, DO      Or    OLANZapine  5 mg Intramuscular Q3H PRN Max 3/day [facility] Holter, DO      OLANZapine  2.5 mg Oral Q4H PRN Max 6/day [facility] Holter, DO      ondansetron  4 mg Oral Q6H PRN HOLLI Lion      polyethylene glycol  17 g Oral Daily HOLLI Lion      polyethylene glycol  17 g Oral Daily PRN [facility] Holter, DO      propranolol  10 mg Oral Q12H KAYLIE HOLLI Lion      risperiDONE  2 mg Oral HS Bora Rosario MD      senna-docusate sodium  1 tablet Oral Daily PRN [facility] Holter, DO      senna-docusate sodium  2 tablet Oral BID HOLLI Lion      traZODone  50 mg Oral HS PRN HOLLI Lion      white petrolatum-mineral oil   Topical TID PRN HOLLI Lion         Risks / Benefits of Treatment:    Risks, benefits, and possible side effects of medications explained to patient and patient verbalizes understanding and agreement for treatment.  The patient has a history of at least 3 antipsychotic medication trials and at this time requires treatment with 2 antipsychotic agents (Risperdal and Clozaril) due to failed multiple trials of monotherapy.    Counseling / Coordination of Care:    Patient's progress discussed with staff in treatment team meeting.  Medications, treatment progress and treatment plan reviewed with patient.    HOLLI Anderson 05/11/24

## 2024-05-10 NOTE — ANESTHESIA PREPROCEDURE EVALUATION
Procedure:  ELECTROCONVULSIVE THERAPY (ECT)    Relevant Problems   CARDIO   (+) Primary hypertension      GI/HEPATIC   (+) GERD (gastroesophageal reflux disease)      Behavioral Health   (+) Schizoaffective disorder, bipolar type (HCC)   (+) Tobacco abuse      Blood   (+) Elevated hemoglobin A1c      FEN/Gastrointestinal   (+) Chronic idiopathic constipation      Dermatology   (+) Confluent and reticulate papillomatosis   (+) Syringoma      Other   (+) Class 2 obesity in adult   (+) Medical clearance for psychiatric admission   (+) T wave inversion in EKG   (+) Vitamin B 12 deficiency   (+) Vitamin D deficiency        Physical Exam    Airway    Mallampati score: II  TM Distance: <3 FB  Neck ROM: full     Dental       Cardiovascular  Cardiovascular exam normal    Pulmonary  Pulmonary exam normal     Other Findings        Anesthesia Plan  ASA Score- 3     Anesthesia Type- general with ASA Monitors.         Additional Monitors:     Airway Plan:            Plan Factors-    Chart reviewed.   Existing labs reviewed.                   Induction- intravenous.    Postoperative Plan-     Informed Consent- Anesthetic plan and risks discussed with patient.  I personally reviewed this patient with the CRNA. Discussed and agreed on the Anesthesia Plan with the CRNA..

## 2024-05-10 NOTE — PLAN OF CARE
Problem: Ineffective Coping  Goal: Identifies ineffective coping skills  Outcome: Progressing  Goal: Identifies healthy coping skills  Outcome: Progressing  Goal: Demonstrates healthy coping skills  Outcome: Progressing  Goal: Participates in unit activities  Description: Interventions:  - Provide therapeutic environment   - Provide required programming   - Redirect inappropriate behaviors   Outcome: Progressing   Alberto attended 17/19 groups offered during the past 2 days.

## 2024-05-10 NOTE — PROCEDURES
"Procedure Note - ECT  Alberto Berumen 28 y.o. male MRN: [id number]    Patient is generally unchanged.  He is doing overall well.  He reports that his mood is \"okay\".  He is guarded and blunted in affect.  However, he does feel that ECT helps him.  He endorses continued auditory hallucinations but feels that he is getting better at ignoring them.  He denies any visual hallucinations.  He denies any SI or HI.  Patient denies any significant adverse effects from ECT including memory impairment or headaches.  He is agreeable to proceed with maintenance treatment this morning.    Time out was taken with staff to confirm correct patient and correct procedure to be performed.    Session Number: Maintenance    Diagnosis: Principal Problem:    Schizoaffective disorder, bipolar type ([facility])  Active Problems:    GERD (gastroesophageal reflux disease)    Medical clearance for psychiatric admission    Tobacco abuse    T wave inversion in EKG    Chronic idiopathic constipation    Confluent and reticulate papillomatosis    Primary hypertension    Elevated hemoglobin A1c      ECT Type: Inpatient, Maintenance    Anesthesia: Etomidate    Electrode Placement: bilateral    Energy level: 100%      Seizure Duration     EE sec  EM sec observed, not detected by machine    PSI 80.3%     Results:Clinical seizure was satisfactory, Patient tolerated ECT well    Vitals:    05/10/24 0734   BP: (!) 177/85   Pulse: 102   Resp: 18   Temp:    SpO2: 98%        Medication Administration - last 24 hours from 2024 0739 to 05/10/2024 0739         Date/Time Order Dose Route Action Action by     05/10/2024 0559 EDT amLODIPine (NORVASC) tablet 5 mg 5 mg Oral Given Isabel Lama RN     2024 0846 EDT amLODIPine (NORVASC) tablet 5 mg 5 mg Oral Given Arin Travis RN     2024 EDT atropine (ISOPTO ATROPINE) 1 % ophthalmic solution 1 drop 1 drop Sublingual Not Given Paolo Smalls RN     2024 0846 EDT escitalopram (LEXAPRO) " tablet 20 mg 20 mg Oral Given Arin Travis RN     05/09/2024 0846 EDT metFORMIN (GLUCOPHAGE) tablet 500 mg 500 mg Oral Given Arin Travis RN     05/09/2024 0845 EDT polyethylene glycol (MIRALAX) packet 17 g 17 g Oral Given Arin Travis RN     05/10/2024 0559 EDT propranolol (INDERAL) tablet 10 mg 10 mg Oral Given Isabel Lama RN     05/09/2024 2112 EDT propranolol (INDERAL) tablet 10 mg 10 mg Oral Given Paolo Smalls, MIGUEL     05/09/2024 0846 EDT propranolol (INDERAL) tablet 10 mg 10 mg Oral Given Arin Travis RN     05/09/2024 1728 EDT senna-docusate sodium (SENOKOT S) 8.6-50 mg per tablet 2 tablet 2 tablet Oral Given Paolo Smalls, MIGUEL     05/09/2024 0846 EDT senna-docusate sodium (SENOKOT S) 8.6-50 mg per tablet 2 tablet 2 tablet Oral Given Arin Travis RN     05/09/2024 2113 EDT melatonin tablet 3 mg 3 mg Oral Given Paolo Smalls, MIGUEL     05/09/2024 0846 EDT cyanocobalamin (VITAMIN B-12) tablet 1,000 mcg 1,000 mcg Oral Given Arin Travis RN     05/09/2024 2113 EDT risperiDONE (RisperDAL) tablet 2 mg 2 mg Oral Given Paolo Smalls, MIGUEL     05/09/2024 2113 EDT cloZAPine (CLOZARIL) tablet 500 mg 500 mg Oral Given Paolo Smalls RN     05/09/2024 1752 EDT nicotine polacrilex (NICORETTE) gum 2 mg 2 mg Oral Given Paolo Smalls RN     05/09/2024 1249 EDT nicotine polacrilex (NICORETTE) gum 2 mg 2 mg Oral Given Arin Travis RN     05/09/2024 0846 EDT nicotine polacrilex (NICORETTE) gum 2 mg 2 mg Oral Given Arin Travis RN     05/09/2024 2112 EDT carbamide peroxide (DEBROX) 6.5 % otic solution 5 drop 5 drop Both Ears Given Paolo Smalls RN     05/09/2024 0846 EDT carbamide peroxide (DEBROX) 6.5 % otic solution 5 drop 5 drop Both Ears Not Given Arin Travis RN             Media Information      Document Information    Clinical Image - Mobile Device   EEG   05/10/2024 07:17   Attached To:   Hospital Encounter on 3/29/24   Source Information    Guy Arroyo MD   [facility]     Guy Arroyo,  MD 05/10/24

## 2024-05-10 NOTE — PROGRESS NOTES
05/10/24 0719   Team Meeting   Meeting Type Daily Rounds   Team Members Present   Team Members Present Physician;Nurse;;Other (Discipline and Name)   Patient/Family Present   Patient Present No   Patient's Family Present No     In attendance:  Dr. Alex Thomas, MD Dr. Jordan Holter, DO Eveline Hunt, HOLLI Lucas, MIGUEL Alvarado, [facility]  Carla Lux, W  Irais Mcdowell M.S.    Groups: 9/10    Pt completed his scheduled ECT this morning. Pt reports ongoing voices and denies other symptoms. Pt is social with select peers and cooperative with others.

## 2024-05-10 NOTE — NURSING NOTE
Patient was in bed during writer's shift. Patient had to be woken for meals. Patient compliant with medications. Patient did not attend groups.

## 2024-05-10 NOTE — PROGRESS NOTES
Progress Note - Behavioral Health   Alberto Berumen 28 y.o. male MRN: [id number]  Unit/Bed#: [facility] 101-02 Encounter: [id number]    Assessment/Plan   Principal Problem:    Schizoaffective disorder, bipolar type ([facility])  Active Problems:    GERD (gastroesophageal reflux disease)    Medical clearance for psychiatric admission    Tobacco abuse    T wave inversion in EKG    Chronic idiopathic constipation    Confluent and reticulate papillomatosis    Primary hypertension    Elevated hemoglobin A1c      Behavior over the last 24 hours:  improved  Sleep: normal  Appetite: normal  Medication side effects: No  ROS: no complaints    Subjective: Patient is a 28 year old male, with schizoaffective disorder, bipolar type. He attended 9/10 groups yesterday. Nursing notes that he was pleasant, cooperative, and social with peers yesterday. Nursing notes no behavioral issues. He reports auditory hallucinations that are constant and express to him that they will kill him and his family. He rated his depression at a 4/10 and his anxiety at a 2/4. He slept good; denies nightmares. He received ECT this morning, he expresses that it went well and that he finds benefit in it. He states that he notices a decline in his memory the day of receiving ECT. He reports that he feels increasingly fatigued throughout the rest of the day.     Assessment: He received his 19th ECT today. He is medication compliant. He reports the auditory hallucinations are constant, but ECT allows him to get out of his head more easily. He intermittently reports feeling paranoid toward the voices in his head.     Mental Status Evaluation:  Appearance:  age appropriate and casually dressed. He is wearing a grey hospital gown, teal loose pants, and light blue crocs with hospital socks. His hair is black and in dreads.    Behavior:  psychomotor retardation. He is mostly hunched over during the conversation with this writer, in and out of sleep but is cooperative,  "pleasant, calm. Makes good eye contact when he sits up to respond to a question.    Speech:  increased latency of response. He occasionally sounds as though he is snoring, but will answer every question asked of him. No speech impediment, normal tone, normal volume, and normal pitch   Mood:  \"Tired\"   Affect:  blunted and mood-congruent   Thought Process:  goal directed and logical   Associations: intact associations   Thought Content:  normal. He denies any experiences leading to persecutory, grandiose, bizarre, or somatic delusions. Denies SI, HI, phobias, obsessions, compulsions, or distorted body perceptions. Does not appear to be hypervigilant.    Perceptual Disturbances: Auditory hallucinations without commands. He states that there are multiple voices, he is unsure of how many. He notes they repeatedly tell him they will harm him and his family. Denies visual, tactile, gustatory, or olfactory hallucinations. Does not appear to be responding to internal stimuli   Risk Potential: Suicidal Ideations none  Homicidal Ideations none  Potential for Aggression No   Sensorium:  person, place, time/date, situation, and day of week   Memory:  recent memory intact, remote memory intact, immediate memory intact  Immediate memory: able to recall 3 unrelated words (shirt, prieto, chair)  Recent memory: unable to recall the 3 unrelated words with guidance; able to recall what he had for dinner; aware of who the current president is   Remote memory: able to recall the name of his elementary school ([employer])   Consciousness:  alert    Attention: attention span and concentration were age appropriate  Attention span: able to repeat a phone number without error  Concentration: able to correctly spell \"build\" backwards and forwards without error   Intelligence: Fund of knowledge: aware of the city where the [place] president lives  Calculation ability: able to calculate the change from a $5 with a $2.50 " purchase  Problem solving: able to express why the moon appears far larger than the stars  Abstract interpretation: able to express similarity between a crow and a pigeon (both birds)  Estimate of intelligence: average   Insight:  age appropriate: he is aware of his diagnosis and the need for medications and therapy   Judgment: age appropriate: he states that he would get help if a cat were stuck in a tree  Social judgment is good: he is dressed appropriately and acts appropriately   Impulse control: He does not appear to have poor impulse control   Gait/Station: normal gait/station and slow   Motor Activity: no abnormal movements     Progress Toward Goals: He is improving toward his goals. He is medication compliant, reports benefit with use of ECT. He attends more than 50% of group therapy on average and is cooperative during groups. He remains to have auditory hallucinations constantly despite therapy, and expresses that he feels slightly depressed but will not elaborate why. He expresses intermittent paranoia. Denies SI.    Recommended Treatment: Continue with group therapy, milieu therapy and occupational therapy.      Discharge plan: Plan is to discharge to his aunt's house with an ACT team in place to ensure he is treatment-compliant and to assist transitioning him into the community.     Risks, benefits and possible side effects of Medications:   Risks, benefits, and possible side effects of medications explained to patient and patient verbalizes understanding.      Medications: all current active meds have been reviewed.    Labs: I have personally reviewed all pertinent laboratory/test results.    Counseling / Coordination of Care  Total floor / unit time spent today 20 minutes. Greater than 50% of total time was spent with the patient and / or family counseling and / or coordination of care.

## 2024-05-10 NOTE — PLAN OF CARE
Problem: Alteration in Thoughts and Perception  Goal: Verbalize thoughts and feelings  Description: Interventions:  - Promote a nonjudgmental and trusting relationship with the patient through active listening and therapeutic communication  - Assess patient's level of functioning, behavior and potential for risk  - Engage patient in 1 on 1 interactions  - Encourage patient to express fears, feelings, frustrations, and discuss symptoms    - [place] patient to reality, help patient recognize reality-based thinking   - Administer medications as ordered and assess for potential side effects  - Provide the patient education related to the signs and symptoms of the illness and desired effects of prescribed medications  Outcome: Progressing  Goal: Refrain from acting on delusional thinking/internal stimuli  Description: Interventions:  - Monitor patient closely, per order   - Utilize least restrictive measures   - Set reasonable limits, give positive feedback for acceptable   - Administer medications as ordered and monitor of potential side effects  Outcome: Progressing  Goal: Agree to be compliant with medication regime, as prescribed and report medication side effects  Description: Interventions:  - Offer appropriate PRN medication and supervise ingestion; conduct AIMS, as needed   Outcome: Not Progressing  Goal: Attend and participate in unit activities, including therapeutic, recreational, and educational groups  Description: Interventions:  -Encourage Visitation and family involvement in care  Outcome: Progressing  Goal: Recognize dysfunctional thoughts, communicate reality-based thoughts at the time of discharge  Description: Interventions:  - Provide medication and psycho-education to assist patient in compliance and developing insight into his/her illness   Outcome: Progressing  Goal: Complete daily ADLs, including personal hygiene independently, as able  Description: Interventions:  - Observe, teach, and assist  patient with ADLS  - Monitor and promote a balance of rest/activity, with adequate nutrition and elimination   Outcome: Progressing     Problem: Ineffective Coping  Goal: Identifies ineffective coping skills  Outcome: Progressing  Goal: Identifies healthy coping skills  Outcome: Progressing  Goal: Demonstrates healthy coping skills  Outcome: Progressing  Goal: Participates in unit activities  Description: Interventions:  - Provide therapeutic environment   - Provide required programming   - Redirect inappropriate behaviors   Outcome: Progressing  Goal: Patient/Family participate in treatment and DC plans  Description: Interventions:  - Provide therapeutic environment  Outcome: Not Progressing  Goal: Patient/Family verbalizes awareness of resources  Outcome: Not Progressing     Problem: Depression  Goal: Verbalize thoughts and feelings  Description: Interventions:  - Assess and re-assess patient's level of risk   - Engage patient in 1:1 interactions, daily, for a minimum of 15 minutes   - Encourage patient to express feelings, fears, frustrations, hopes   Outcome: Not Progressing  Goal: Refrain from harming self  Description: Interventions:  - Monitor patient closely, per order   - Supervise medication ingestion, monitor effects and side effects   Outcome: Progressing  Goal: Refrain from isolation  Description: Interventions:  - Develop a trusting relationship   - Encourage socialization   Outcome: Progressing  Goal: Attend and participate in unit activities, including therapeutic, recreational, and educational groups  Description: Interventions:  - Provide therapeutic and educational activities daily, encourage attendance and participation, and document same in the medical record   Outcome: Progressing  Goal: Complete daily ADLs, including personal hygiene independently, as able  Description: Interventions:  - Observe, teach, and assist patient with ADLS  -  Monitor and promote a balance of rest/activity, with  adequate nutrition and elimination   Outcome: Progressing     Problem: Anxiety  Goal: Anxiety is at manageable level  Description: Interventions:  - Assess and monitor patient's anxiety level.   - Monitor for signs and symptoms (heart palpitations, chest pain, shortness of breath, headaches, nausea, feeling jumpy, restlessness, irritable, apprehensive).   - Collaborate with interdisciplinary team and initiate plan and interventions as ordered.  - [place] patient to unit/surroundings  - Explain treatment plan  - Encourage participation in care  - Encourage verbalization of concerns/fears  - Identify coping mechanisms  - Assist in developing anxiety-reducing skills  - Administer/offer alternative therapies  - Limit or eliminate stimulants  Outcome: Not Progressing     Problem: Alteration in Orientation  Goal: Interact with staff daily  Description: Interventions:  - Assess and re-assess patient's level of orientation  - Engage patient in 1 on 1 interactions, daily, for a minimum of 15 minutes   - Establish rapport/trust with patient   Outcome: Progressing  Goal: Express concerns related to confused thinking related to:  Description: Interventions:  - Encourage patient to express feelings, fears, frustrations, hopes  - Assign consistent caregivers   - [place]/re-orient patient as needed  - Allow comfort items, as appropriate  - Provide visual cues, signs, etc.   Outcome: Progressing  Goal: Allow medical examinations, as recommended  Description: Interventions:  - Provide physical/neurological exams and/or referrals, per provider   Outcome: Progressing  Goal: Cooperate with recommended testing/procedures  Description: Interventions:  - Determine need for ancillary testing  - Observe for mental status changes  - Implement falls/precaution protocol   Outcome: Progressing  Goal: Attend and participate in unit activities, including therapeutic, recreational, and educational groups  Description: Interventions:  - Provide  therapeutic and educational activities daily, encourage attendance and participation, and document same in the medical record   - Provide appropriate opportunities for reminiscence   - Provide a consistent daily routine   - Encourage family contact/visitation   Outcome: Progressing  Goal: Complete daily ADLs, including personal hygiene independently, as able  Description: Interventions:  - Observe, teach, and assist patient with ADLS  Outcome: Progressing     Problem: Individualized Interventions  Goal: Patient will verbalize appropriate use of telephone within 5 days  Description: Interventions:  - Treatment team to determine use of supervised phone privileges   Outcome: Completed  Goal: Patient will verbalize need for hospitalization and will no longer attempt elopement within 5 days  Description: Interventions:  - Ongoing education to help patient understand need for hospitalization  Outcome: Completed  Goal: Patient will recognize inappropriate behaviors and develop alternative behaviors within 5 days  Description: Interventions:  - Patient in collaboration with Treatment Team will develop a behavior management plan to help identify effective coping skills to deal with stressors  Outcome: Completed     Problem: Electroconvulsive therapy (ECT)  Goal: Verbalizes/displays adequate comfort level or baseline comfort level  Description: Interventions:  - Encourage patient to monitor pain and request assistance  - Assess pain using appropriate pain scale  - Administer analgesics based on type and severity of pain and evaluate response  - Implement non-pharmacological measures as appropriate and evaluate response  - Consider cultural and social influences on pain and pain management  - Notify physician/advanced practitioner if interventions unsuccessful or patient reports new pain  Outcome: Progressing  Goal: Achieves stable or improved neurological status  Description: INTERVENTIONS  - Monitor and report changes in  neurological status  - Monitor vital signs such as temperature, blood pressure, glucose, and any other labs ordered   - Initiate measures to prevent increased intracranial pressure  - Monitor for seizure activity and implement precautions if appropriate      Outcome: Progressing  Goal: Achieves maximal functionality and self care  Description: INTERVENTIONS  - Monitor swallowing and airway patency with patient fatigue and changes in neurological status  - Encourage and assist patient to increase activity and self care.   - Encourage visually impaired, hearing impaired and aphasic patients to use assistive/communication devices  Outcome: Progressing  Goal: Maintain or return mobility to safest level of function  Description: INTERVENTIONS:  - Assess patient's ability to carry out ADLs; assess patient's baseline for ADL function and identify physical deficits which impact ability to perform ADLs (bathing, care of mouth/teeth, toileting, grooming, dressing, etc.)  - Assess/evaluate cause of self-care deficits   - Assess range of motion  - Assess patient's mobility  - Assess patient's need for assistive devices and provide as appropriate  - Encourage maximum independence but intervene and supervise when necessary  - Involve family in performance of ADLs  - Assess for home care needs following discharge   - Consider OT consult to assist with ADL evaluation and planning for discharge  - Provide patient education as appropriate  Outcome: Progressing  Goal: Absence of urinary retention  Description: INTERVENTIONS:  - Assess patient’s ability to void and empty bladder  - Monitor I/O  - Bladder scan as needed  - Discuss with physician/AP medications to alleviate retention as needed  - Discuss catheterization for long term situations as appropriate  Outcome: Progressing  Goal: Minimal or absence of nausea and/or vomiting  Description: INTERVENTIONS:  - Administer IV fluids if ordered to ensure adequate hydration  - Maintain  NPO status until nausea and vomiting are resolved  - Nasogastric tube if ordered  - Administer ordered antiemetic medications as needed  - Provide nonpharmacologic comfort measures as appropriate  - Advance diet as tolerated, if ordered  - Consider nutrition services referral to assist patient with adequate nutrition and appropriate food choices  Outcome: Progressing  Goal: Maintains adequate nutritional intake  Description: INTERVENTIONS:  - Monitor percentage of each meal consumed  - Identify factors contributing to decreased intake, treat as appropriate  - Assist with meals as needed  - Monitor I&O, weight, and lab values if indicated  - Obtain nutrition services referral as needed  Outcome: Progressing     Problem: DISCHARGE PLANNING - CARE MANAGEMENT  Goal: Discharge to post-acute care or home with appropriate resources  Description: INTERVENTIONS:  - Conduct assessment to determine patient/family and health care team treatment goals, and need for post-acute services based on payer coverage, community resources, and patient preferences, and barriers to discharge  - Address psychosocial, clinical, and financial barriers to discharge as identified in assessment in conjunction with the patient/family and health care team  - Arrange appropriate level of post-acute services according to patient’s   needs and preference and payer coverage in collaboration with the physician and health care team  - Communicate with and update the patient/family, physician, and health care team regarding progress on the discharge plan  - Arrange appropriate transportation to post-acute venues  Outcome: Not Progressing

## 2024-05-11 PROCEDURE — 99232 SBSQ HOSP IP/OBS MODERATE 35: CPT | Performed by: NURSE PRACTITIONER

## 2024-05-11 RX ADMIN — ATROPINE SULFATE 1 DROP: 10 SOLUTION/ DROPS OPHTHALMIC at 21:17

## 2024-05-11 RX ADMIN — CYANOCOBALAMIN TAB 1000 MCG 1000 MCG: 1000 TAB at 08:27

## 2024-05-11 RX ADMIN — PROPRANOLOL HYDROCHLORIDE 10 MG: 10 TABLET ORAL at 08:26

## 2024-05-11 RX ADMIN — MELATONIN TAB 3 MG 3 MG: 3 TAB at 21:17

## 2024-05-11 RX ADMIN — CLOZAPINE 500 MG: 100 TABLET ORAL at 21:17

## 2024-05-11 RX ADMIN — NICOTINE POLACRILEX 2 MG: 2 GUM, CHEWING ORAL at 08:27

## 2024-05-11 RX ADMIN — AMLODIPINE BESYLATE 5 MG: 5 TABLET ORAL at 08:27

## 2024-05-11 RX ADMIN — ESCITALOPRAM OXALATE 20 MG: 10 TABLET, FILM COATED ORAL at 08:26

## 2024-05-11 RX ADMIN — METFORMIN HYDROCHLORIDE 500 MG: 500 TABLET ORAL at 08:27

## 2024-05-11 RX ADMIN — NICOTINE POLACRILEX 2 MG: 2 GUM, CHEWING ORAL at 17:24

## 2024-05-11 RX ADMIN — RISPERIDONE 2 MG: 2 TABLET, FILM COATED ORAL at 21:17

## 2024-05-11 RX ADMIN — PROPRANOLOL HYDROCHLORIDE 10 MG: 10 TABLET ORAL at 21:17

## 2024-05-11 RX ADMIN — SENNOSIDES AND DOCUSATE SODIUM 2 TABLET: 8.6; 5 TABLET ORAL at 08:27

## 2024-05-11 RX ADMIN — NICOTINE POLACRILEX 2 MG: 2 GUM, CHEWING ORAL at 21:17

## 2024-05-11 RX ADMIN — SENNOSIDES AND DOCUSATE SODIUM 2 TABLET: 8.6; 5 TABLET ORAL at 17:24

## 2024-05-11 RX ADMIN — POLYETHYLENE GLYCOL 3350 17 G: 17 POWDER, FOR SOLUTION ORAL at 08:26

## 2024-05-11 NOTE — PLAN OF CARE
Problem: Alteration in Thoughts and Perception  Goal: Verbalize thoughts and feelings  Description: Interventions:  - Promote a nonjudgmental and trusting relationship with the patient through active listening and therapeutic communication  - Assess patient's level of functioning, behavior and potential for risk  - Engage patient in 1 on 1 interactions  - Encourage patient to express fears, feelings, frustrations, and discuss symptoms    - [place] patient to reality, help patient recognize reality-based thinking   - Administer medications as ordered and assess for potential side effects  - Provide the patient education related to the signs and symptoms of the illness and desired effects of prescribed medications  Outcome: Progressing  Goal: Agree to be compliant with medication regime, as prescribed and report medication side effects  Description: Interventions:  - Offer appropriate PRN medication and supervise ingestion; conduct AIMS, as needed   Outcome: Progressing  Goal: Attend and participate in unit activities, including therapeutic, recreational, and educational groups  Description: Interventions:  -Encourage Visitation and family involvement in care  Outcome: Progressing  Goal: Complete daily ADLs, including personal hygiene independently, as able  Description: Interventions:  - Observe, teach, and assist patient with ADLS  - Monitor and promote a balance of rest/activity, with adequate nutrition and elimination   Outcome: Progressing     Problem: Ineffective Coping  Goal: Identifies ineffective coping skills  Outcome: Progressing  Goal: Identifies healthy coping skills  Outcome: Progressing  Goal: Demonstrates healthy coping skills  Outcome: Progressing  Goal: Participates in unit activities  Description: Interventions:  - Provide therapeutic environment   - Provide required programming   - Redirect inappropriate behaviors   Outcome: Progressing  Goal: Patient/Family participate in treatment and DC  plans  Description: Interventions:  - Provide therapeutic environment  Outcome: Progressing  Goal: Patient/Family verbalizes awareness of resources  Outcome: Progressing     Problem: Depression  Goal: Treatment Goal: Demonstrate behavioral control of depressive symptoms, verbalize feelings of improved mood/affect, and adopt new coping skills prior to discharge  Outcome: Progressing  Goal: Verbalize thoughts and feelings  Description: Interventions:  - Assess and re-assess patient's level of risk   - Engage patient in 1:1 interactions, daily, for a minimum of 15 minutes   - Encourage patient to express feelings, fears, frustrations, hopes   Outcome: Progressing  Goal: Refrain from harming self  Description: Interventions:  - Monitor patient closely, per order   - Supervise medication ingestion, monitor effects and side effects   Outcome: Progressing  Goal: Refrain from isolation  Description: Interventions:  - Develop a trusting relationship   - Encourage socialization   Outcome: Progressing  Goal: Refrain from self-neglect  Outcome: Progressing  Goal: Attend and participate in unit activities, including therapeutic, recreational, and educational groups  Description: Interventions:  - Provide therapeutic and educational activities daily, encourage attendance and participation, and document same in the medical record   Outcome: Progressing  Goal: Complete daily ADLs, including personal hygiene independently, as able  Description: Interventions:  - Observe, teach, and assist patient with ADLS  -  Monitor and promote a balance of rest/activity, with adequate nutrition and elimination   Outcome: Progressing     Problem: Anxiety  Goal: Anxiety is at manageable level  Description: Interventions:  - Assess and monitor patient's anxiety level.   - Monitor for signs and symptoms (heart palpitations, chest pain, shortness of breath, headaches, nausea, feeling jumpy, restlessness, irritable, apprehensive).   - Collaborate with  interdisciplinary team and initiate plan and interventions as ordered.  - [place] patient to unit/surroundings  - Explain treatment plan  - Encourage participation in care  - Encourage verbalization of concerns/fears  - Identify coping mechanisms  - Assist in developing anxiety-reducing skills  - Administer/offer alternative therapies  - Limit or eliminate stimulants  Outcome: Progressing     Problem: Alteration in Orientation  Goal: Treatment Goal: Demonstrate a reduction of confusion and improved orientation to person, place, time and/or situation upon discharge, according to optimum baseline/ability  Outcome: Progressing  Goal: Interact with staff daily  Description: Interventions:  - Assess and re-assess patient's level of orientation  - Engage patient in 1 on 1 interactions, daily, for a minimum of 15 minutes   - Establish rapport/trust with patient   Outcome: Progressing  Goal: Express concerns related to confused thinking related to:  Description: Interventions:  - Encourage patient to express feelings, fears, frustrations, hopes  - Assign consistent caregivers   - [place]/re-orient patient as needed  - Allow comfort items, as appropriate  - Provide visual cues, signs, etc.   Outcome: Progressing  Goal: Allow medical examinations, as recommended  Description: Interventions:  - Provide physical/neurological exams and/or referrals, per provider   Outcome: Progressing  Goal: Cooperate with recommended testing/procedures  Description: Interventions:  - Determine need for ancillary testing  - Observe for mental status changes  - Implement falls/precaution protocol   Outcome: Progressing  Goal: Attend and participate in unit activities, including therapeutic, recreational, and educational groups  Description: Interventions:  - Provide therapeutic and educational activities daily, encourage attendance and participation, and document same in the medical record   - Provide appropriate opportunities for reminiscence   -  Provide a consistent daily routine   - Encourage family contact/visitation   Outcome: Progressing  Goal: Complete daily ADLs, including personal hygiene independently, as able  Description: Interventions:  - Observe, teach, and assist patient with ADLS  Outcome: Progressing     Problem: Electroconvulsive therapy (ECT)  Goal: Treatment Goal: Demonstrate a reduction of confusion and improved orientation to person, place, time and/or situation upon discharge, according to optimum baseline/ability  Outcome: Progressing  Goal: Verbalizes/displays adequate comfort level or baseline comfort level  Description: Interventions:  - Encourage patient to monitor pain and request assistance  - Assess pain using appropriate pain scale  - Administer analgesics based on type and severity of pain and evaluate response  - Implement non-pharmacological measures as appropriate and evaluate response  - Consider cultural and social influences on pain and pain management  - Notify physician/advanced practitioner if interventions unsuccessful or patient reports new pain  Outcome: Progressing  Goal: Achieves stable or improved neurological status  Description: INTERVENTIONS  - Monitor and report changes in neurological status  - Monitor vital signs such as temperature, blood pressure, glucose, and any other labs ordered   - Initiate measures to prevent increased intracranial pressure  - Monitor for seizure activity and implement precautions if appropriate      Outcome: Progressing  Goal: Achieves maximal functionality and self care  Description: INTERVENTIONS  - Monitor swallowing and airway patency with patient fatigue and changes in neurological status  - Encourage and assist patient to increase activity and self care.   - Encourage visually impaired, hearing impaired and aphasic patients to use assistive/communication devices  Outcome: Progressing  Goal: Maintain or return mobility to safest level of function  Description:  INTERVENTIONS:  - Assess patient's ability to carry out ADLs; assess patient's baseline for ADL function and identify physical deficits which impact ability to perform ADLs (bathing, care of mouth/teeth, toileting, grooming, dressing, etc.)  - Assess/evaluate cause of self-care deficits   - Assess range of motion  - Assess patient's mobility  - Assess patient's need for assistive devices and provide as appropriate  - Encourage maximum independence but intervene and supervise when necessary  - Involve family in performance of ADLs  - Assess for home care needs following discharge   - Consider OT consult to assist with ADL evaluation and planning for discharge  - Provide patient education as appropriate  Outcome: Progressing  Goal: Absence of urinary retention  Description: INTERVENTIONS:  - Assess patient’s ability to void and empty bladder  - Monitor I/O  - Bladder scan as needed  - Discuss with physician/AP medications to alleviate retention as needed  - Discuss catheterization for long term situations as appropriate  Outcome: Progressing  Goal: Minimal or absence of nausea and/or vomiting  Description: INTERVENTIONS:  - Administer IV fluids if ordered to ensure adequate hydration  - Maintain NPO status until nausea and vomiting are resolved  - Nasogastric tube if ordered  - Administer ordered antiemetic medications as needed  - Provide nonpharmacologic comfort measures as appropriate  - Advance diet as tolerated, if ordered  - Consider nutrition services referral to assist patient with adequate nutrition and appropriate food choices  Outcome: Progressing  Goal: Maintains adequate nutritional intake  Description: INTERVENTIONS:  - Monitor percentage of each meal consumed  - Identify factors contributing to decreased intake, treat as appropriate  - Assist with meals as needed  - Monitor I&O, weight, and lab values if indicated  - Obtain nutrition services referral as needed  Outcome: Progressing     Problem:  DISCHARGE PLANNING - CARE MANAGEMENT  Goal: Discharge to post-acute care or home with appropriate resources  Description: INTERVENTIONS:  - Conduct assessment to determine patient/family and health care team treatment goals, and need for post-acute services based on payer coverage, community resources, and patient preferences, and barriers to discharge  - Address psychosocial, clinical, and financial barriers to discharge as identified in assessment in conjunction with the patient/family and health care team  - Arrange appropriate level of post-acute services according to patient’s   needs and preference and payer coverage in collaboration with the physician and health care team  - Communicate with and update the patient/family, physician, and health care team regarding progress on the discharge plan  - Arrange appropriate transportation to post-acute venues  Outcome: Progressing

## 2024-05-11 NOTE — NURSING NOTE
"Alert, cooperative and visible intermittently. No SI or HI noted. Pt c/o of having depression and auditory hallucination of the voices telling him they are \"going to kill him and his family.\" Pt states he is not listening to the voices. Pt is administered routine dose of lexapro for depression. Denies anxiety and pain. Attended nursing education, and art therapy group. Consumed 100% of breakfast and 100% of lunch. Took all medication without prompting. Maintained on safe precautions without incident. Will continue to monitor progress and recovery program.   "

## 2024-05-11 NOTE — NURSING NOTE
Alert, cooperative and visible intermittently. Socializing with selective staff. Consumed 100% of dinner. Took all medication without prompting. Maintained on safe precautions without incident.   From: Kolby Cabello  To: Latisha Liang MD  Sent: 1/29/2020 1:51 PM CST  Subject: Medication Question    I had received a phone call last week that Aimovig was now covered by Insurance. I checked at my pharmacy today and my copay is $341.00. That is way more than I can afford to pay.     My migraines have been much better since I started taking Lisinopril for my blood pressure. My blood pressure had been extremely high each day that I had taken it. I will at this point go without the shot of Aimovig. I was looking forward to see if it would work to relieve the migraines but it's just too much for me to pay.    Let me know your thoughts.    Caio

## 2024-05-12 VITALS
DIASTOLIC BLOOD PRESSURE: 83 MMHG | SYSTOLIC BLOOD PRESSURE: 139 MMHG | WEIGHT: 259.4 LBS | BODY MASS INDEX: 41.69 KG/M2 | RESPIRATION RATE: 18 BRPM | HEIGHT: 66 IN | HEART RATE: 102 BPM | OXYGEN SATURATION: 100 % | TEMPERATURE: 97.9 F

## 2024-05-12 PROCEDURE — 99232 SBSQ HOSP IP/OBS MODERATE 35: CPT | Performed by: NURSE PRACTITIONER

## 2024-05-12 RX ADMIN — MELATONIN TAB 3 MG 3 MG: 3 TAB at 21:11

## 2024-05-12 RX ADMIN — RISPERIDONE 2 MG: 2 TABLET, FILM COATED ORAL at 21:11

## 2024-05-12 RX ADMIN — SENNOSIDES AND DOCUSATE SODIUM 2 TABLET: 8.6; 5 TABLET ORAL at 08:37

## 2024-05-12 RX ADMIN — POLYETHYLENE GLYCOL 3350 17 G: 17 POWDER, FOR SOLUTION ORAL at 08:39

## 2024-05-12 RX ADMIN — PROPRANOLOL HYDROCHLORIDE 10 MG: 10 TABLET ORAL at 08:37

## 2024-05-12 RX ADMIN — PROPRANOLOL HYDROCHLORIDE 10 MG: 10 TABLET ORAL at 21:10

## 2024-05-12 RX ADMIN — NICOTINE POLACRILEX 2 MG: 2 GUM, CHEWING ORAL at 21:11

## 2024-05-12 RX ADMIN — NICOTINE POLACRILEX 2 MG: 2 GUM, CHEWING ORAL at 08:37

## 2024-05-12 RX ADMIN — CLOZAPINE 500 MG: 100 TABLET ORAL at 21:11

## 2024-05-12 RX ADMIN — METFORMIN HYDROCHLORIDE 500 MG: 500 TABLET ORAL at 08:39

## 2024-05-12 RX ADMIN — ESCITALOPRAM OXALATE 20 MG: 10 TABLET, FILM COATED ORAL at 08:37

## 2024-05-12 RX ADMIN — NICOTINE POLACRILEX 2 MG: 2 GUM, CHEWING ORAL at 17:02

## 2024-05-12 RX ADMIN — SENNOSIDES AND DOCUSATE SODIUM 2 TABLET: 8.6; 5 TABLET ORAL at 17:02

## 2024-05-12 RX ADMIN — AMLODIPINE BESYLATE 5 MG: 5 TABLET ORAL at 08:37

## 2024-05-12 RX ADMIN — CYANOCOBALAMIN TAB 1000 MCG 1000 MCG: 1000 TAB at 08:37

## 2024-05-12 NOTE — PROGRESS NOTES
Progress Note - Behavioral Health     Alberto Berumen 28 y.o. male MRN: [id number]   Unit/Bed#: [facility] 101-02 Encounter: [id number]      Documentation, nursing notes, medication reconciliation, labs, and vitals reviewed. Patient was seen for continuing care and reviewed with treatment team. No acute events over the past 24 hours.  Per nursing report, no new concerns.    No medication changes over the past 24 hours. Continues to tolerate current medications with no adverse effects.     On evaluation today, seen in his room and resting in bed.  Continues to report ongoing auditory hallucinations, voices threatening him and his family.  However, does state voices are less intrusive.  Continues to report gradual improvement in depressive symptoms.    No suicidal ideation, plan, or intent. and does not exhibit any symptoms of aimee on evaluation.    Sleep: slept off and on  Appetite: fair  Medication side effects: No   ROS: no complaints, all other systems are negative    Mental Status Evaluation:    Appearance:  age appropriate   Behavior:  cooperative, calm   Speech:  slow, soft   Mood:  depressed   Affect:  flat   Thought Process:  coherent   Associations: intact associations   Thought Content:  paranoid ideation   Perceptual Disturbances: auditory hallucinations   Risk Potential: Suicidal ideation - None  Homicidal ideation - None  Potential for aggression - No   Sensorium:  oriented to person, place, and time/date   Memory:  recent and remote memory grossly intact   Consciousness:  alert and awake   Attention: decreased concentration and decreased attention span   Insight:  limited   Judgment: limited   Gait/Station: normal gait/station, normal balance   Motor Activity: no abnormal movements     Vital signs in last 24 hours:    Temp:  [97.7 °F (36.5 °C)-97.8 °F (36.6 °C)] 97.8 °F (36.6 °C)  HR:  [] 90  Resp:  [18] 18  BP: (129-160)/(81-97) 140/89    Laboratory results: I have personally reviewed all pertinent  laboratory/tests results.      Progress Toward Goals: limited improvement    Assessment/Plan   Principal Problem:    Schizoaffective disorder, bipolar type (HCC)  Active Problems:    GERD (gastroesophageal reflux disease)    Medical clearance for psychiatric admission    Tobacco abuse    T wave inversion in EKG    Chronic idiopathic constipation    Confluent and reticulate papillomatosis    Primary hypertension    Elevated hemoglobin A1c    Recommended Treatment:     Planned medication and treatment changes:    All current active medications have been reviewed  Encourage group therapy, milieu therapy and occupational therapy  Behavioral Health checks every 7 minutes    Requires continued inpatient treatment due to chronic illness and high risk of decompensation if discharged before long term stability is achieved.    Continues with monthly maintenance ECT    Continue current medications:  Current Facility-Administered Medications   Medication Dose Route Frequency Provider Last Rate    acetaminophen  650 mg Oral Q4H PRN Jordan C Holter, DO      acetaminophen  650 mg Oral Q6H PRN HOLLI Lion      aluminum-magnesium hydroxide-simethicone  30 mL Oral Q4H PRN Jordan C Holter, DO      amLODIPine  5 mg Oral Daily HOLLI Lion      Artificial Tears  1 drop Both Eyes Q3H PRN Jordan C Holter, DO      atropine  1 drop Sublingual HS HOLLI iLon      atropine  1 drop Sublingual Daily PRN HOLLI Lion      haloperidol lactate  2.5 mg Intramuscular Q4H PRN Max 4/day HOLLI Lion      And    LORazepam  1 mg Intramuscular Q4H PRN Max 4/day HOLLI Lion      And    benztropine  0.5 mg Intramuscular Q4H PRN Max 4/day HOLLI Lion      benztropine  1 mg Intramuscular Q4H PRN Max 6/day Jordan C Holter, DO      haloperidol lactate  5 mg Intramuscular Q4H PRN Max 4/day HOLLI Lion      And    LORazepam  2 mg Intramuscular Q4H PRN Max 4/day HOLLI Lion      And    benztropine  1 mg  Intramuscular Q4H PRN Max 4/day HOLLI Lion      benztropine  1 mg Oral Q4H PRN Max 6/day HOLLI Lion      benztropine  1 mg Oral Q4H PRN Max 6/day Jordan C Holter, DO      bisacodyl  10 mg Rectal Daily PRN HOLLI Lion      cloZAPine  500 mg Oral HS Bora Rosario MD      cyanocobalamin  1,000 mcg Oral Daily HOLLI Galvan      hydrOXYzine HCL  50 mg Oral Q6H PRN Max 4/day [facility] Holter, DO      Or    diphenhydrAMINE  50 mg Intramuscular Q6H PRN Jordan C Holter, DO      hydrOXYzine HCL  50 mg Oral Q6H PRN Max 4/day HOLLI Lion      Or    diphenhydrAMINE  50 mg Intramuscular Q6H PRN HOLLI Lion      diphenhydrAMINE-zinc acetate   Topical BID PRN HOLLI Lion      ergocalciferol  50,000 Units Oral Weekly HOLLI Galvan      escitalopram  20 mg Oral Daily HOLLI Lion      haloperidol  1 mg Oral Q6H PRN HOLLI Lion      haloperidol  2.5 mg Oral Q4H PRN Max 4/day HOLLI Lion      haloperidol  5 mg Oral Q4H PRN Max 4/day HOLLI Lion      hydrocortisone   Topical 4x Daily PRN HOLLI Lion      hydrOXYzine HCL  100 mg Oral Q6H PRN Max 4/day Jordan C Holter, DO      Or    LORazepam  2 mg Intramuscular Q6H PRN Jordan C Holter, DO      hydrOXYzine HCL  100 mg Oral Q6H PRN Max 4/day HOLLI Lion      Or    LORazepam  2 mg Intramuscular Q6H PRN HOLLI Lion      hydrOXYzine HCL  25 mg Oral Q6H PRN Max 4/day [facility] Cresencioter, DO      ibuprofen  600 mg Oral Q8H PRN HOLLI Lion      melatonin  3 mg Oral HS Ion  Holter, DO      metFORMIN  500 mg Oral Daily With Breakfast HOLLI Lion      methocarbamol  500 mg Oral Q6H PRN HOLLI Lion      nicotine polacrilex  2 mg Oral Q4H PRN Bora Rosario MD      OLANZapine  5 mg Oral Q4H PRN Max 3/day Ion FISCHER Holter, DO      Or    OLANZapine  2.5 mg Intramuscular Q4H PRN Max 3/day Ion FISCHER Holter, DO      OLANZapine  5 mg Oral Q3H PRN Max 3/day Ion FISCHER Holter,  DO      Or    OLANZapine  5 mg Intramuscular Q3H PRN Max 3/day Jordan C Holter, DO      OLANZapine  2.5 mg Oral Q4H PRN Max 6/day Jordan C Holter, DO      ondansetron  4 mg Oral Q6H PRN HOLLI Lion      polyethylene glycol  17 g Oral Daily HOLLI Lion      polyethylene glycol  17 g Oral Daily PRN Jordan C Holter, DO      propranolol  10 mg Oral Q12H KAYLIE HOLLI Lion      risperiDONE  2 mg Oral HS Bora Rosario MD      senna-docusate sodium  1 tablet Oral Daily PRN Jordan C Holter, DO      senna-docusate sodium  2 tablet Oral BID HOLLI Lion      traZODone  50 mg Oral HS PRN HOLLI Lion      white petrolatum-mineral oil   Topical TID PRN HOLLI Lion         Risks / Benefits of Treatment:    Risks, benefits, and possible side effects of medications explained to patient and patient verbalizes understanding and agreement for treatment.    Counseling / Coordination of Care:    Patient's progress discussed with staff in treatment team meeting.  Medications, treatment progress and treatment plan reviewed with patient.    HOLLI Anderson 05/12/24

## 2024-05-12 NOTE — NURSING NOTE
Alberto maintained on ongoing SAFE precautions without incident on this shift. Awake,alert, cooperative and pleasant upon approach. Attended and participated in 5 out 8 groups today. Dancing, singing and mingling during groups time with his peers and staff. Continues to be compliant with meds and snack. Denies depression or anxiety.  No overt delusion or A/V/T Hallucination noted.  No Behavior noted.

## 2024-05-12 NOTE — NURSING NOTE
Alert, cooperative and visible intermittently. No SI or HI noted. Denies hallucinations at this time. Denies depression, anxiety and pain. Did not attend any groups. Consumed 100% of breakfast and 100% of lunch. Took all medication without prompting. Maintained on safe precautions without incident. Will continue to monitor progress and recovery program.

## 2024-05-12 NOTE — PLAN OF CARE
Problem: Alteration in Thoughts and Perception  Goal: Treatment Goal: Gain control of psychotic behaviors/thinking, reduce/eliminate presenting symptoms and demonstrate improved reality functioning upon discharge  Outcome: Progressing  Goal: Verbalize thoughts and feelings  Description: Interventions:  - Promote a nonjudgmental and trusting relationship with the patient through active listening and therapeutic communication  - Assess patient's level of functioning, behavior and potential for risk  - Engage patient in 1 on 1 interactions  - Encourage patient to express fears, feelings, frustrations, and discuss symptoms    - [place] patient to reality, help patient recognize reality-based thinking   - Administer medications as ordered and assess for potential side effects  - Provide the patient education related to the signs and symptoms of the illness and desired effects of prescribed medications  Outcome: Progressing  Goal: Refrain from acting on delusional thinking/internal stimuli  Description: Interventions:  - Monitor patient closely, per order   - Utilize least restrictive measures   - Set reasonable limits, give positive feedback for acceptable   - Administer medications as ordered and monitor of potential side effects  Outcome: Progressing  Goal: Agree to be compliant with medication regime, as prescribed and report medication side effects  Description: Interventions:  - Offer appropriate PRN medication and supervise ingestion; conduct AIMS, as needed   Outcome: Progressing  Goal: Attend and participate in unit activities, including therapeutic, recreational, and educational groups  Description: Interventions:  -Encourage Visitation and family involvement in care  Outcome: Progressing  Goal: Recognize dysfunctional thoughts, communicate reality-based thoughts at the time of discharge  Description: Interventions:  - Provide medication and psycho-education to assist patient in compliance and developing  insight into his/her illness   Outcome: Progressing  Goal: Complete daily ADLs, including personal hygiene independently, as able  Description: Interventions:  - Observe, teach, and assist patient with ADLS  - Monitor and promote a balance of rest/activity, with adequate nutrition and elimination   Outcome: Progressing     Problem: Ineffective Coping  Goal: Identifies ineffective coping skills  Outcome: Progressing  Goal: Identifies healthy coping skills  Outcome: Progressing  Goal: Demonstrates healthy coping skills  Outcome: Progressing  Goal: Participates in unit activities  Description: Interventions:  - Provide therapeutic environment   - Provide required programming   - Redirect inappropriate behaviors   Outcome: Progressing  Goal: Patient/Family participate in treatment and DC plans  Description: Interventions:  - Provide therapeutic environment  Outcome: Progressing  Goal: Patient/Family verbalizes awareness of resources  Outcome: Progressing     Problem: Depression  Goal: Treatment Goal: Demonstrate behavioral control of depressive symptoms, verbalize feelings of improved mood/affect, and adopt new coping skills prior to discharge  Outcome: Progressing  Goal: Verbalize thoughts and feelings  Description: Interventions:  - Assess and re-assess patient's level of risk   - Engage patient in 1:1 interactions, daily, for a minimum of 15 minutes   - Encourage patient to express feelings, fears, frustrations, hopes   Outcome: Progressing  Goal: Refrain from harming self  Description: Interventions:  - Monitor patient closely, per order   - Supervise medication ingestion, monitor effects and side effects   Outcome: Progressing  Goal: Refrain from isolation  Description: Interventions:  - Develop a trusting relationship   - Encourage socialization   Outcome: Progressing  Goal: Refrain from self-neglect  Outcome: Progressing  Goal: Attend and participate in unit activities, including therapeutic, recreational, and  educational groups  Description: Interventions:  - Provide therapeutic and educational activities daily, encourage attendance and participation, and document same in the medical record   Outcome: Progressing  Goal: Complete daily ADLs, including personal hygiene independently, as able  Description: Interventions:  - Observe, teach, and assist patient with ADLS  -  Monitor and promote a balance of rest/activity, with adequate nutrition and elimination   Outcome: Progressing     Problem: Anxiety  Goal: Anxiety is at manageable level  Description: Interventions:  - Assess and monitor patient's anxiety level.   - Monitor for signs and symptoms (heart palpitations, chest pain, shortness of breath, headaches, nausea, feeling jumpy, restlessness, irritable, apprehensive).   - Collaborate with interdisciplinary team and initiate plan and interventions as ordered.  - [place] patient to unit/surroundings  - Explain treatment plan  - Encourage participation in care  - Encourage verbalization of concerns/fears  - Identify coping mechanisms  - Assist in developing anxiety-reducing skills  - Administer/offer alternative therapies  - Limit or eliminate stimulants  Outcome: Progressing     Problem: Alteration in Orientation  Goal: Treatment Goal: Demonstrate a reduction of confusion and improved orientation to person, place, time and/or situation upon discharge, according to optimum baseline/ability  Outcome: Progressing  Goal: Interact with staff daily  Description: Interventions:  - Assess and re-assess patient's level of orientation  - Engage patient in 1 on 1 interactions, daily, for a minimum of 15 minutes   - Establish rapport/trust with patient   Outcome: Progressing  Goal: Express concerns related to confused thinking related to:  Description: Interventions:  - Encourage patient to express feelings, fears, frustrations, hopes  - Assign consistent caregivers   - [place]/re-orient patient as needed  - Allow comfort items, as  appropriate  - Provide visual cues, signs, etc.   Outcome: Progressing  Goal: Allow medical examinations, as recommended  Description: Interventions:  - Provide physical/neurological exams and/or referrals, per provider   Outcome: Progressing  Goal: Cooperate with recommended testing/procedures  Description: Interventions:  - Determine need for ancillary testing  - Observe for mental status changes  - Implement falls/precaution protocol   Outcome: Progressing  Goal: Attend and participate in unit activities, including therapeutic, recreational, and educational groups  Description: Interventions:  - Provide therapeutic and educational activities daily, encourage attendance and participation, and document same in the medical record   - Provide appropriate opportunities for reminiscence   - Provide a consistent daily routine   - Encourage family contact/visitation   Outcome: Progressing  Goal: Complete daily ADLs, including personal hygiene independently, as able  Description: Interventions:  - Observe, teach, and assist patient with ADLS  Outcome: Progressing     Problem: Electroconvulsive therapy (ECT)  Goal: Treatment Goal: Demonstrate a reduction of confusion and improved orientation to person, place, time and/or situation upon discharge, according to optimum baseline/ability  Outcome: Progressing  Goal: Verbalizes/displays adequate comfort level or baseline comfort level  Description: Interventions:  - Encourage patient to monitor pain and request assistance  - Assess pain using appropriate pain scale  - Administer analgesics based on type and severity of pain and evaluate response  - Implement non-pharmacological measures as appropriate and evaluate response  - Consider cultural and social influences on pain and pain management  - Notify physician/advanced practitioner if interventions unsuccessful or patient reports new pain  Outcome: Progressing  Goal: Achieves stable or improved neurological  status  Description: INTERVENTIONS  - Monitor and report changes in neurological status  - Monitor vital signs such as temperature, blood pressure, glucose, and any other labs ordered   - Initiate measures to prevent increased intracranial pressure  - Monitor for seizure activity and implement precautions if appropriate      Outcome: Progressing  Goal: Achieves maximal functionality and self care  Description: INTERVENTIONS  - Monitor swallowing and airway patency with patient fatigue and changes in neurological status  - Encourage and assist patient to increase activity and self care.   - Encourage visually impaired, hearing impaired and aphasic patients to use assistive/communication devices  Outcome: Progressing  Goal: Maintain or return mobility to safest level of function  Description: INTERVENTIONS:  - Assess patient's ability to carry out ADLs; assess patient's baseline for ADL function and identify physical deficits which impact ability to perform ADLs (bathing, care of mouth/teeth, toileting, grooming, dressing, etc.)  - Assess/evaluate cause of self-care deficits   - Assess range of motion  - Assess patient's mobility  - Assess patient's need for assistive devices and provide as appropriate  - Encourage maximum independence but intervene and supervise when necessary  - Involve family in performance of ADLs  - Assess for home care needs following discharge   - Consider OT consult to assist with ADL evaluation and planning for discharge  - Provide patient education as appropriate  Outcome: Progressing  Goal: Absence of urinary retention  Description: INTERVENTIONS:  - Assess patient’s ability to void and empty bladder  - Monitor I/O  - Bladder scan as needed  - Discuss with physician/AP medications to alleviate retention as needed  - Discuss catheterization for long term situations as appropriate  Outcome: Progressing  Goal: Minimal or absence of nausea and/or vomiting  Description: INTERVENTIONS:  -  Administer IV fluids if ordered to ensure adequate hydration  - Maintain NPO status until nausea and vomiting are resolved  - Nasogastric tube if ordered  - Administer ordered antiemetic medications as needed  - Provide nonpharmacologic comfort measures as appropriate  - Advance diet as tolerated, if ordered  - Consider nutrition services referral to assist patient with adequate nutrition and appropriate food choices  Outcome: Progressing  Goal: Maintains adequate nutritional intake  Description: INTERVENTIONS:  - Monitor percentage of each meal consumed  - Identify factors contributing to decreased intake, treat as appropriate  - Assist with meals as needed  - Monitor I&O, weight, and lab values if indicated  - Obtain nutrition services referral as needed  Outcome: Progressing     Problem: DISCHARGE PLANNING - CARE MANAGEMENT  Goal: Discharge to post-acute care or home with appropriate resources  Description: INTERVENTIONS:  - Conduct assessment to determine patient/family and health care team treatment goals, and need for post-acute services based on payer coverage, community resources, and patient preferences, and barriers to discharge  - Address psychosocial, clinical, and financial barriers to discharge as identified in assessment in conjunction with the patient/family and health care team  - Arrange appropriate level of post-acute services according to patient’s   needs and preference and payer coverage in collaboration with the physician and health care team  - Communicate with and update the patient/family, physician, and health care team regarding progress on the discharge plan  - Arrange appropriate transportation to post-acute venues  Outcome: Progressing

## 2024-05-12 NOTE — NURSING NOTE
Alert, cooperative and visible intermittently. Consumed 100% of dinner. Took all medication without prompting. Maintained on safe precautions without incident.

## 2024-05-13 LAB
BNP SERPL-MCNC: 17 PG/ML (ref 0–100)
CK SERPL-CCNC: 295 U/L (ref 39–308)
CLOZAPINE SERPL-MCNC: 565 NG/ML (ref 350–900)
CRP SERPL QL: 9.1 MG/L

## 2024-05-13 PROCEDURE — 83880 ASSAY OF NATRIURETIC PEPTIDE: CPT | Performed by: PSYCHIATRY & NEUROLOGY

## 2024-05-13 PROCEDURE — 86140 C-REACTIVE PROTEIN: CPT | Performed by: PSYCHIATRY & NEUROLOGY

## 2024-05-13 PROCEDURE — 82550 ASSAY OF CK (CPK): CPT | Performed by: PSYCHIATRY & NEUROLOGY

## 2024-05-13 PROCEDURE — 80159 DRUG ASSAY CLOZAPINE: CPT | Performed by: PSYCHIATRY & NEUROLOGY

## 2024-05-13 PROCEDURE — 99232 SBSQ HOSP IP/OBS MODERATE 35: CPT | Performed by: PSYCHIATRY & NEUROLOGY

## 2024-05-13 RX ADMIN — ESCITALOPRAM OXALATE 20 MG: 10 TABLET, FILM COATED ORAL at 09:29

## 2024-05-13 RX ADMIN — METFORMIN HYDROCHLORIDE 500 MG: 500 TABLET ORAL at 09:29

## 2024-05-13 RX ADMIN — NICOTINE POLACRILEX 2 MG: 2 GUM, CHEWING ORAL at 13:01

## 2024-05-13 RX ADMIN — MELATONIN TAB 3 MG 3 MG: 3 TAB at 21:29

## 2024-05-13 RX ADMIN — CLOZAPINE 500 MG: 100 TABLET ORAL at 21:29

## 2024-05-13 RX ADMIN — SENNOSIDES AND DOCUSATE SODIUM 2 TABLET: 8.6; 5 TABLET ORAL at 17:08

## 2024-05-13 RX ADMIN — NICOTINE POLACRILEX 2 MG: 2 GUM, CHEWING ORAL at 09:32

## 2024-05-13 RX ADMIN — SENNOSIDES AND DOCUSATE SODIUM 2 TABLET: 8.6; 5 TABLET ORAL at 09:28

## 2024-05-13 RX ADMIN — NICOTINE POLACRILEX 2 MG: 2 GUM, CHEWING ORAL at 21:29

## 2024-05-13 RX ADMIN — NICOTINE POLACRILEX 2 MG: 2 GUM, CHEWING ORAL at 17:08

## 2024-05-13 RX ADMIN — AMLODIPINE BESYLATE 5 MG: 5 TABLET ORAL at 08:38

## 2024-05-13 RX ADMIN — PROPRANOLOL HYDROCHLORIDE 10 MG: 10 TABLET ORAL at 08:29

## 2024-05-13 RX ADMIN — POLYETHYLENE GLYCOL 3350 17 G: 17 POWDER, FOR SOLUTION ORAL at 09:28

## 2024-05-13 RX ADMIN — CYANOCOBALAMIN TAB 1000 MCG 1000 MCG: 1000 TAB at 09:28

## 2024-05-13 RX ADMIN — RISPERIDONE 2 MG: 2 TABLET, FILM COATED ORAL at 21:29

## 2024-05-13 RX ADMIN — PROPRANOLOL HYDROCHLORIDE 10 MG: 10 TABLET ORAL at 21:28

## 2024-05-13 NOTE — PLAN OF CARE
Problem: Alteration in Thoughts and Perception  Goal: Treatment Goal: Gain control of psychotic behaviors/thinking, reduce/eliminate presenting symptoms and demonstrate improved reality functioning upon discharge  Outcome: Progressing  Goal: Verbalize thoughts and feelings  Description: Interventions:  - Promote a nonjudgmental and trusting relationship with the patient through active listening and therapeutic communication  - Assess patient's level of functioning, behavior and potential for risk  - Engage patient in 1 on 1 interactions  - Encourage patient to express fears, feelings, frustrations, and discuss symptoms    - [place] patient to reality, help patient recognize reality-based thinking   - Administer medications as ordered and assess for potential side effects  - Provide the patient education related to the signs and symptoms of the illness and desired effects of prescribed medications  Outcome: Progressing  Goal: Refrain from acting on delusional thinking/internal stimuli  Description: Interventions:  - Monitor patient closely, per order   - Utilize least restrictive measures   - Set reasonable limits, give positive feedback for acceptable   - Administer medications as ordered and monitor of potential side effects  Outcome: Progressing  Goal: Agree to be compliant with medication regime, as prescribed and report medication side effects  Description: Interventions:  - Offer appropriate PRN medication and supervise ingestion; conduct AIMS, as needed   Outcome: Progressing  Goal: Attend and participate in unit activities, including therapeutic, recreational, and educational groups  Description: Interventions:  -Encourage Visitation and family involvement in care  Outcome: Progressing  Goal: Recognize dysfunctional thoughts, communicate reality-based thoughts at the time of discharge  Description: Interventions:  - Provide medication and psycho-education to assist patient in compliance and developing  insight into his/her illness   Outcome: Progressing  Goal: Complete daily ADLs, including personal hygiene independently, as able  Description: Interventions:  - Observe, teach, and assist patient with ADLS  - Monitor and promote a balance of rest/activity, with adequate nutrition and elimination   Outcome: Progressing     Problem: Ineffective Coping  Goal: Identifies ineffective coping skills  Outcome: Progressing  Goal: Identifies healthy coping skills  Outcome: Progressing  Goal: Demonstrates healthy coping skills  Outcome: Progressing  Goal: Participates in unit activities  Description: Interventions:  - Provide therapeutic environment   - Provide required programming   - Redirect inappropriate behaviors   Outcome: Progressing  Goal: Patient/Family participate in treatment and DC plans  Description: Interventions:  - Provide therapeutic environment  Outcome: Progressing  Goal: Patient/Family verbalizes awareness of resources  Outcome: Progressing     Problem: Depression  Goal: Treatment Goal: Demonstrate behavioral control of depressive symptoms, verbalize feelings of improved mood/affect, and adopt new coping skills prior to discharge  Outcome: Progressing  Goal: Verbalize thoughts and feelings  Description: Interventions:  - Assess and re-assess patient's level of risk   - Engage patient in 1:1 interactions, daily, for a minimum of 15 minutes   - Encourage patient to express feelings, fears, frustrations, hopes   Outcome: Progressing  Goal: Refrain from harming self  Description: Interventions:  - Monitor patient closely, per order   - Supervise medication ingestion, monitor effects and side effects   Outcome: Progressing  Goal: Refrain from isolation  Description: Interventions:  - Develop a trusting relationship   - Encourage socialization   Outcome: Progressing  Goal: Refrain from self-neglect  Outcome: Progressing  Goal: Attend and participate in unit activities, including therapeutic, recreational, and  educational groups  Description: Interventions:  - Provide therapeutic and educational activities daily, encourage attendance and participation, and document same in the medical record   Outcome: Progressing  Goal: Complete daily ADLs, including personal hygiene independently, as able  Description: Interventions:  - Observe, teach, and assist patient with ADLS  -  Monitor and promote a balance of rest/activity, with adequate nutrition and elimination   Outcome: Progressing     Problem: Anxiety  Goal: Anxiety is at manageable level  Description: Interventions:  - Assess and monitor patient's anxiety level.   - Monitor for signs and symptoms (heart palpitations, chest pain, shortness of breath, headaches, nausea, feeling jumpy, restlessness, irritable, apprehensive).   - Collaborate with interdisciplinary team and initiate plan and interventions as ordered.  - [place] patient to unit/surroundings  - Explain treatment plan  - Encourage participation in care  - Encourage verbalization of concerns/fears  - Identify coping mechanisms  - Assist in developing anxiety-reducing skills  - Administer/offer alternative therapies  - Limit or eliminate stimulants  Outcome: Progressing     Problem: Alteration in Orientation  Goal: Treatment Goal: Demonstrate a reduction of confusion and improved orientation to person, place, time and/or situation upon discharge, according to optimum baseline/ability  Outcome: Progressing  Goal: Interact with staff daily  Description: Interventions:  - Assess and re-assess patient's level of orientation  - Engage patient in 1 on 1 interactions, daily, for a minimum of 15 minutes   - Establish rapport/trust with patient   Outcome: Progressing  Goal: Express concerns related to confused thinking related to:  Description: Interventions:  - Encourage patient to express feelings, fears, frustrations, hopes  - Assign consistent caregivers   - [place]/re-orient patient as needed  - Allow comfort items, as  appropriate  - Provide visual cues, signs, etc.   Outcome: Progressing  Goal: Allow medical examinations, as recommended  Description: Interventions:  - Provide physical/neurological exams and/or referrals, per provider   Outcome: Progressing  Goal: Cooperate with recommended testing/procedures  Description: Interventions:  - Determine need for ancillary testing  - Observe for mental status changes  - Implement falls/precaution protocol   Outcome: Progressing  Goal: Attend and participate in unit activities, including therapeutic, recreational, and educational groups  Description: Interventions:  - Provide therapeutic and educational activities daily, encourage attendance and participation, and document same in the medical record   - Provide appropriate opportunities for reminiscence   - Provide a consistent daily routine   - Encourage family contact/visitation   Outcome: Progressing  Goal: Complete daily ADLs, including personal hygiene independently, as able  Description: Interventions:  - Observe, teach, and assist patient with ADLS  Outcome: Progressing     Problem: Electroconvulsive therapy (ECT)  Goal: Treatment Goal: Demonstrate a reduction of confusion and improved orientation to person, place, time and/or situation upon discharge, according to optimum baseline/ability  Outcome: Progressing  Goal: Verbalizes/displays adequate comfort level or baseline comfort level  Description: Interventions:  - Encourage patient to monitor pain and request assistance  - Assess pain using appropriate pain scale  - Administer analgesics based on type and severity of pain and evaluate response  - Implement non-pharmacological measures as appropriate and evaluate response  - Consider cultural and social influences on pain and pain management  - Notify physician/advanced practitioner if interventions unsuccessful or patient reports new pain  Outcome: Progressing  Goal: Achieves stable or improved neurological  status  Description: INTERVENTIONS  - Monitor and report changes in neurological status  - Monitor vital signs such as temperature, blood pressure, glucose, and any other labs ordered   - Initiate measures to prevent increased intracranial pressure  - Monitor for seizure activity and implement precautions if appropriate      Outcome: Progressing  Goal: Achieves maximal functionality and self care  Description: INTERVENTIONS  - Monitor swallowing and airway patency with patient fatigue and changes in neurological status  - Encourage and assist patient to increase activity and self care.   - Encourage visually impaired, hearing impaired and aphasic patients to use assistive/communication devices  Outcome: Progressing  Goal: Maintain or return mobility to safest level of function  Description: INTERVENTIONS:  - Assess patient's ability to carry out ADLs; assess patient's baseline for ADL function and identify physical deficits which impact ability to perform ADLs (bathing, care of mouth/teeth, toileting, grooming, dressing, etc.)  - Assess/evaluate cause of self-care deficits   - Assess range of motion  - Assess patient's mobility  - Assess patient's need for assistive devices and provide as appropriate  - Encourage maximum independence but intervene and supervise when necessary  - Involve family in performance of ADLs  - Assess for home care needs following discharge   - Consider OT consult to assist with ADL evaluation and planning for discharge  - Provide patient education as appropriate  Outcome: Progressing  Goal: Absence of urinary retention  Description: INTERVENTIONS:  - Assess patient’s ability to void and empty bladder  - Monitor I/O  - Bladder scan as needed  - Discuss with physician/AP medications to alleviate retention as needed  - Discuss catheterization for long term situations as appropriate  Outcome: Progressing  Goal: Minimal or absence of nausea and/or vomiting  Description: INTERVENTIONS:  -  Administer IV fluids if ordered to ensure adequate hydration  - Maintain NPO status until nausea and vomiting are resolved  - Nasogastric tube if ordered  - Administer ordered antiemetic medications as needed  - Provide nonpharmacologic comfort measures as appropriate  - Advance diet as tolerated, if ordered  - Consider nutrition services referral to assist patient with adequate nutrition and appropriate food choices  Outcome: Progressing  Goal: Maintains adequate nutritional intake  Description: INTERVENTIONS:  - Monitor percentage of each meal consumed  - Identify factors contributing to decreased intake, treat as appropriate  - Assist with meals as needed  - Monitor I&O, weight, and lab values if indicated  - Obtain nutrition services referral as needed  Outcome: Progressing     Problem: DISCHARGE PLANNING - CARE MANAGEMENT  Goal: Discharge to post-acute care or home with appropriate resources  Description: INTERVENTIONS:  - Conduct assessment to determine patient/family and health care team treatment goals, and need for post-acute services based on payer coverage, community resources, and patient preferences, and barriers to discharge  - Address psychosocial, clinical, and financial barriers to discharge as identified in assessment in conjunction with the patient/family and health care team  - Arrange appropriate level of post-acute services according to patient’s   needs and preference and payer coverage in collaboration with the physician and health care team  - Communicate with and update the patient/family, physician, and health care team regarding progress on the discharge plan  - Arrange appropriate transportation to post-acute venues  Outcome: Progressing

## 2024-05-13 NOTE — PLAN OF CARE
Problem: Ineffective Coping  Goal: Identifies ineffective coping skills  Outcome: Progressing  Goal: Identifies healthy coping skills  Outcome: Progressing  Goal: Demonstrates healthy coping skills  Outcome: Progressing  Goal: Participates in unit activities  Description: Interventions:  - Provide therapeutic environment   - Provide required programming   - Redirect inappropriate behaviors   Outcome: Progressing   Alberto's percentage of engagement in treatment last week was 81%.

## 2024-05-13 NOTE — PROGRESS NOTES
05/13/24 1135   Team Meeting   Meeting Type Tx Team Meeting   Initial Conference Date 05/13/24   Next Conference Date 05/27/24   Team Members Present   Team Members Present Physician;Nurse;;Other (Discipline and Name)   Physician Team Member Abraham   Nursing Team Member Claudia   Social Work Team Member Jenny ORTEGA   Other (Discipline and Name) William BARBER; Geronimo GUTIERREZ   Patient/Family Present   Patient Present Yes   Patient's Family Present No   OTHER   Team Meeting - Additional Comments Pt attended his treatment team meeting. Pt reports depression has lessened and he has been tolerating ECT well. Pt reports that spending time with peers has been helping his mood as well. Pt's discharge disposition will be to go home with his aunt with an ACT team.

## 2024-05-13 NOTE — NURSING NOTE
Alberto maintained on ongoing SAFE precautions without incident on this shift. Awake,alert, cooperative and pleasant upon approach. Attended and participated in 3 out 6 groups today.. Continues to be compliant with meds and snack. Denies depression or anxiety.  No overt delusion or A/V/T Hallucination noted.  No Behavior noted

## 2024-05-13 NOTE — NURSING NOTE
Patient is visible on unit, requests gum twice but otherwise voices no concerns. Pt takes all medication without issue. Pt is social and attending groups. Pt pleasant and cooperative with care.

## 2024-05-13 NOTE — PROGRESS NOTES
"Progress Note - Behavioral Health   Alberto Berumen 28 y.o. male MRN: [id number]  Unit/Bed#: [facility] 101-02 Encounter: [id number]    Assessment/Plan   Principal Problem:    Schizoaffective disorder, bipolar type ([facility])  Active Problems:    GERD (gastroesophageal reflux disease)    Medical clearance for psychiatric admission    Tobacco abuse    T wave inversion in EKG    Chronic idiopathic constipation    Confluent and reticulate papillomatosis    Primary hypertension    Elevated hemoglobin A1c      Behavior over the last 24 hours:  improved  Sleep: normal  Appetite: normal  Medication side effects: No  ROS: no complaints    Subjective: Patient is a 28 year old male, with schizoaffective disorder, bipolar type. He attended 3/9 groups on Friday, 5/10/24. He engaged in Actito -- noting he sang and danced to multiple songs. He states that he slept almost all of the day on Friday due to fatigue from ECT. He states that his weekend was \"not too bad.\" He reports that the auditory hallucinations are constant, and tell him that they will kill him and his family. He notes that he intermittently feels paranoid about the voices, but that he is able to get out of his head more easily with the help of ECT. He notes that he sometimes feels a \"little depressed\" but not constantly; denies feelings of guilt, SI, hopelessness. Denies anxiety. He expresses that he missed breakfast today due to sleeping through it. He required a few attempts to wake him up, but he was willing and cooperative to discuss with this writer. Denies nightmares. He is medication compliant. No behavioral issues over the weekend.     Assessment: He states that the multiple voices in his head are constant, but he finds ECT to be beneficial in coping with the voices. He is medication compliant, and is regularly cooperative in group therapy.    Mental Status Evaluation:  Appearance:  age appropriate, casually dressed, and appears well-groomed. He is wearing a grey " "patient gown with black sweatpants on and light blue crocs. He has black hair in loose dreads.    Behavior:  psychomotor retardation. He is cooperative, pleasant, joyful, calm, and makes good eye contact. Occasionally, he needs questions repeated of him due to zoning out with fatigue.    Speech:  increased latency of response. Normal volume, normal pitch, normal tone. No speech impediment.   Mood:  \"Okay\"   Affect:  blunted and mood-congruent. Appropriate affect to thought content. Stable affect   Thought Process:  goal directed and logical   Associations: intact associations   Thought Content:  delusions  persecutory. He notes occasionally feeling paranoid about the voices that tell him they'll kill him and his family. Denies any experiences leading to grandiose, somatic, or bizarre delusions. Denies SI, HI, phobias, obsessions, compulsions, or distorted body perceptions. Does not appear to be hypervigilant.    Perceptual Disturbances: Auditory hallucinations without commands. Notes there are multiple voices that constantly tell him they will kill him and his family. He denies visual, tactile, gustatory, or olfactory hallucinations. Denies feelings of depersonalization, derealization, or illusions. Does not appear to be responding to internal stimuli.    Risk Potential: Suicidal Ideations none  Homicidal Ideations none  Potential for Aggression No   Sensorium:  person, place, time/date, situation, and day of week   Memory:  recent memory intact, remote memory intact, immediate memory intact  Immediate memory: able to recall 3 unrelated words without error (tree, sock, cup)  Recent memory: able to recall what he had for dinner last night; able to recall 2/3 unrelated words 5 minutes later; aware of the current [place] president  Remote memory: able to recall the name of his childhood dog   Consciousness:  alert    Attention: attention span and concentration were age appropriate  Attention span: able to repeat a phone " "number without error  Concentration: able to spell \"WATER\" back and forth without error   Intelligence: Fund of knowledge: he is aware of the place that the current [place] president lives in   Abstract interpretation: able to express the similarity between cat and dog   Calculation ability: able to perform simple multiplication problems (6x3,6x4,6x5)  Problem solving: able to relay why the moon appears larger than the stars  Estimate of intelligence: average   Insight:  age appropriate: he is aware of his diagnosis and the need for medication and therapy   Judgment: age appropriate: he states he would get outside if someone yelled fire in a store. Social judgement is good: he is dressed appropriately and acts appropriately.    Impulse control: Does not appear to have poor impulse control   Gait/Station: normal gait/station and slow   Motor Activity: no abnormal movements     Progress Toward Goals: Patient is improving, as he is finding benefit with ECT in managing the auditory hallucinations. He is medication compliant, he is cooperative and pleasant with staff, and he is cooperative in group therapy.     Recommended Treatment: Continue with group therapy, milieu therapy and occupational therapy.      Discharge: plan is to discharge to his aunt's house with an ACT team established to ensure he is medication-compliant and to assist in transitioning to the community.     Risks, benefits and possible side effects of Medications:   Risks, benefits, and possible side effects of medications explained to patient and patient verbalizes understanding.      Medications: all current active meds have been reviewed.    Labs: I have personally reviewed all pertinent laboratory/tests results.     Counseling / Coordination of Care  Total floor / unit time spent today 20 minutes. Greater than 50% of total time was spent with the patient and / or family counseling and / or coordination of care.   "

## 2024-05-13 NOTE — PROGRESS NOTES
05/13/24 0722   Team Meeting   Meeting Type Daily Rounds   Team Members Present   Team Members Present Physician;Nurse;;Other (Discipline and Name)   Patient/Family Present   Patient Present No   Patient's Family Present No     In attendance:  Dr. Alex Thomas, MD Dr. Jordan Holter, DO Eveline Hunt, HOLLI Taylor, RN  Luisana Alvarado, [facility]  Carla Lux, W  MATILDA Daniel.S.    Groups: 3/9    Pt hears ongoing auditory hallucinations. Pt has been visible and social on the unit, engaged in activities with peers. Pt is tolerating ECT well. No bx concerns.

## 2024-05-13 NOTE — NURSING NOTE
Alberto maintain on ongoing SAFE precaution without incident on this shift. Observed resting in bed with eyes closed, respiration even and unlabored. Continuous Q 7 minutes check implemented thru the night. No behavioral noted    [FreeTextEntry1] : Yumiko is a 13-year-old female with bilateral knee and ankle pain.\par \par Today's visit included obtaining the history from the child and parent, due to the child's age, the child could not be considered a reliable historian, requiring the parent to act as an independent historian. The condition, natural history, and prognosis were explained to the patient and family. The clinical findings and imaging were reviewed with the family. Clinically on exam today she had full range of motion of the knee and ankle with no pain, however generalized muscle tightness. In regards to her flat feet they are very mild and do not cause valgus of the ankle.  It was discussed that inserts will not correct her flatfeet they will just support her arch while they are being utilized.  Her mother expressed that there is concerns with the insurance and obtaining custom inserts.  Over-the-counter medial arch supports were recommended to trial to see if they improve her discomfort.\par  In regards to her knee and ankle pain her mother notes that the pain is mostly with prolonged motion.  It was discussed that the recommendation at this time is to complete a course of physical therapy again to work on stretching and strengthening of the knees and ankles.  A new prescription was provided today.  The importance of a active lifestyle was discussed today.  Follow up recommended in my office after a course of therapy if her symptoms persist for reevaluation.  All questions and concerns were addressed today. Parent and patient verbalize understanding and agree with plan of care.\par \par Teresa APONTE PA-C have acted as a scribe and documented the above information for Dr. Godinez \par

## 2024-05-13 NOTE — NURSING NOTE
Pt is visible on the unit and social with select peers. Consumed 100% of dinner. Took medications without incidence but refused atropine drops. Pt is pleasant and cooperative. Attended most groups. Reports AHs that threaten to kill him and his family and denies other psych symptoms. No behavioral issues. Pt offers no concerns or complaints. VSS. Continuous safety checks maintained.

## 2024-05-14 LAB
BASOPHILS # BLD AUTO: 0.05 THOUSANDS/ÂΜL (ref 0–0.1)
BASOPHILS NFR BLD AUTO: 1 % (ref 0–1)
EOSINOPHIL # BLD AUTO: 0.3 THOUSAND/ÂΜL (ref 0–0.61)
EOSINOPHIL NFR BLD AUTO: 3 % (ref 0–6)
ERYTHROCYTE [DISTWIDTH] IN BLOOD BY AUTOMATED COUNT: 14.9 % (ref 11.6–15.1)
HCT VFR BLD AUTO: 41.7 % (ref 36.5–49.3)
HGB BLD-MCNC: 12.3 G/DL (ref 12–17)
IMM GRANULOCYTES # BLD AUTO: 0.04 THOUSAND/UL (ref 0–0.2)
IMM GRANULOCYTES NFR BLD AUTO: 1 % (ref 0–2)
LYMPHOCYTES # BLD AUTO: 3.06 THOUSANDS/ÂΜL (ref 0.6–4.47)
LYMPHOCYTES NFR BLD AUTO: 35 % (ref 14–44)
MCH RBC QN AUTO: 23.7 PG (ref 26.8–34.3)
MCHC RBC AUTO-ENTMCNC: 29.5 G/DL (ref 31.4–37.4)
MCV RBC AUTO: 81 FL (ref 82–98)
MONOCYTES # BLD AUTO: 0.78 THOUSAND/ÂΜL (ref 0.17–1.22)
MONOCYTES NFR BLD AUTO: 9 % (ref 4–12)
NEUTROPHILS # BLD AUTO: 4.54 THOUSANDS/ÂΜL (ref 1.85–7.62)
NEUTS SEG NFR BLD AUTO: 51 % (ref 43–75)
NRBC BLD AUTO-RTO: 0 /100 WBCS
PLATELET # BLD AUTO: 263 THOUSANDS/UL (ref 149–390)
PMV BLD AUTO: 10.7 FL (ref 8.9–12.7)
RBC # BLD AUTO: 5.18 MILLION/UL (ref 3.88–5.62)
WBC # BLD AUTO: 8.77 THOUSAND/UL (ref 4.31–10.16)

## 2024-05-14 PROCEDURE — 85025 COMPLETE CBC W/AUTO DIFF WBC: CPT | Performed by: INTERNAL MEDICINE

## 2024-05-14 PROCEDURE — 99232 SBSQ HOSP IP/OBS MODERATE 35: CPT | Performed by: PSYCHIATRY & NEUROLOGY

## 2024-05-14 RX ADMIN — RISPERIDONE 2 MG: 2 TABLET, FILM COATED ORAL at 21:15

## 2024-05-14 RX ADMIN — PROPRANOLOL HYDROCHLORIDE 10 MG: 10 TABLET ORAL at 21:15

## 2024-05-14 RX ADMIN — CLOZAPINE 500 MG: 100 TABLET ORAL at 21:15

## 2024-05-14 RX ADMIN — SENNOSIDES AND DOCUSATE SODIUM 2 TABLET: 8.6; 5 TABLET ORAL at 17:26

## 2024-05-14 RX ADMIN — NICOTINE POLACRILEX 2 MG: 2 GUM, CHEWING ORAL at 17:51

## 2024-05-14 RX ADMIN — ERGOCALCIFEROL 50000 UNITS: 1.25 CAPSULE, LIQUID FILLED ORAL at 08:22

## 2024-05-14 RX ADMIN — ESCITALOPRAM OXALATE 20 MG: 10 TABLET, FILM COATED ORAL at 08:21

## 2024-05-14 RX ADMIN — SENNOSIDES AND DOCUSATE SODIUM 2 TABLET: 8.6; 5 TABLET ORAL at 08:21

## 2024-05-14 RX ADMIN — CYANOCOBALAMIN TAB 1000 MCG 1000 MCG: 1000 TAB at 08:22

## 2024-05-14 RX ADMIN — METFORMIN HYDROCHLORIDE 500 MG: 500 TABLET ORAL at 08:22

## 2024-05-14 RX ADMIN — NICOTINE POLACRILEX 2 MG: 2 GUM, CHEWING ORAL at 13:01

## 2024-05-14 RX ADMIN — POLYETHYLENE GLYCOL 3350 17 G: 17 POWDER, FOR SOLUTION ORAL at 08:21

## 2024-05-14 RX ADMIN — MELATONIN TAB 3 MG 3 MG: 3 TAB at 21:16

## 2024-05-14 RX ADMIN — NICOTINE POLACRILEX 2 MG: 2 GUM, CHEWING ORAL at 08:21

## 2024-05-14 RX ADMIN — PROPRANOLOL HYDROCHLORIDE 10 MG: 10 TABLET ORAL at 08:22

## 2024-05-14 RX ADMIN — AMLODIPINE BESYLATE 5 MG: 5 TABLET ORAL at 08:22

## 2024-05-14 NOTE — NURSING NOTE
"Alberto ambulates about the unit with peers; social with select peers. He is pleasantly superficial. He still is c/o intermittent anxiety \"from thinking about things\" when he hears voices which say they will kill his family.  He says the ECT is most definitely helping.  He is scant is his words and presentation. He offers no history of where he has been or what has been going on in the recenrt past before [place]    "

## 2024-05-14 NOTE — NURSING NOTE
Received pt in bed at change of shift with eyes closed; chest movement noted.  Continues the same thus this far as per q 7 min room checks.   Will continue to monitor behavior, sleeping pattern and any medical issues that may arise.    0617  CBCD obtained and delivered to our lab.

## 2024-05-14 NOTE — PROGRESS NOTES
05/14/24 0719   Team Meeting   Meeting Type Daily Rounds   Team Members Present   Team Members Present Physician;Nurse;;Other (Discipline and Name)   Patient/Family Present   Patient Present No   Patient's Family Present No     In attendance:  Dr. Alex Thomas, MD Dr. Jordan Holter, DO Eveline Hunt, HOLLI Taylor, RN  Luisana Alvarado, [facility]  Carla Lux, W  MATILDA Daniel.S.    Groups: 6/9    Pt reports ongoing auditory hallucinations and denies other symptoms. Pt reports that ECT has been helpful. Pt had labs completed this morning. No bx issues noted.

## 2024-05-14 NOTE — PROGRESS NOTES
"Progress Note - Behavioral Health   Alberto Berumen 28 y.o. male MRN: [id number]  Unit/Bed#: [facility] 101-02 Encounter: [id number]    Assessment/Plan   Principal Problem:    Schizoaffective disorder, bipolar type ([facility])  Active Problems:    GERD (gastroesophageal reflux disease)    Medical clearance for psychiatric admission    Tobacco abuse    T wave inversion in EKG    Chronic idiopathic constipation    Confluent and reticulate papillomatosis    Primary hypertension    Elevated hemoglobin A1c    Behavior over the last 24 hours:  improved  Sleep: normal  Appetite: normal  Medication side effects: No  ROS: no complaints    Subjective: Patient is a 28 year old male, with schizoaffective disorder, bipolar type. He attended 6/9 groups yesterday. He reported auditory hallucinations to the evening nursing staff; noted that they \"are not as bothersome.\" Staff notes he is pleasant, social with peers, and visible on the unit. He notes that he slept well, states that he would like to sleep longer though. Denies nightmares. He expresses feelings of depression, notes these feelings occur \"up and down.\" Denies SI, feelings of guilt, hopelessness. He reports that he may be discharged in the next 1-2 months, and he is scared about being discharged because of the voices. He says he is afraid the auditory hallucinations will become persistent. He states that he and his aunt would be the only two living at her house.     Assessment: He reports benefit with use of ECT, he is medication compliant, and attends more than 50% of group therapy daily. He notes that the multiple voices are constant and continuously tell him that they will kill him and his family.     Mental Status Evaluation:  Appearance:  age appropriate and casually dressed. He is wearing a grey patient gown, teal hospital pants, and light blue crocs. He has black hair in dreadlocks.    Behavior:  psychomotor retardation. He is slowed in his responses, he mainly remains " "hunched over but he makes good eye contact. Cooperative, calm, pleasant   Speech:  increased latency of response. No speech impediments. Normal volume, normal tone, normal pitch.    Mood:  \"Stable\"   Affect:  blunted and mood-congruent. Appropriate affect to thought content   Thought Process:  goal directed and logical   Associations: intact associations   Thought Content:  Persecutory delusions -- paranoid about the voices in his head, especially once he is discharged. He denies any experiences leading to grandiose, somatic, or bizarre delusions. Denies SI, HI, phobias, obsessions, compulsions, or distorted body perceptions. Does not appear to be hypervigilant.   Perceptual Disturbances: Auditory hallucinations without commands. He expresses there are multiple voices that repeatedly tell him that they will kill him and his family. Denies visual, tactile, gustatory, or olfactory hallucinations. Denies feelings of depersonalization, derealization, or illusions. Does not appear to be responding to internal stimuli   Risk Potential: Suicidal Ideations none  Homicidal Ideations none  Potential for Aggression No   Sensorium:  person, place, time/date, situation, and day of week   Memory:  recent memory intact, remote memory intact, immediate memory intact  Immediate: able to recall 3 unrelated words (apple, table, green)  Recent: able to recall what he had for breakfast; able to recall all 3 unrelated words 5 minutes later; able to express who the current [place] president is   Consciousness:  alert    Attention: attention span and concentration were age appropriate  Attention: able to repeat a phone number  Concentration: able to spell \"candy\" backwards and forwards; he was able to right himself when he forgot a letter spelling candy backwards   Intelligence: Fund of general knowledge: aware of the capital of [place]; unaware of how many [place] there are  Abstract interpretation: able to relay the similarity between mittens " "and a scarf (\"both worn in the cold\")  Problem solving: able to relay why the moon appears larger than the stars  Calculation ability: able to perform simple multiplication (12x2;12x5;12x3) -- made error when asked what 12x4 is  Estimate of intelligence: average    Insight:  age appropriate: aware of his diagnosis and the need for medication and therapy   Judgment: age appropriate: he expresses he would bring it to the workers if a wallet was found dropped in a grocery store; social judgment is good -- he is appropriately dressed and acts appropriately   Impulse control: Does not appear to have poor impulse controls   Gait/Station: normal gait/station and slow   Motor Activity: no abnormal movements     Progress Toward Goals: He is improving in his goals; he is less isolative, more present on the unit, social with peers, cooperative in group therapy, and expresses benefit of getting out of his head with ECT. He is medication compliant. He notes that the auditory hallucinations are constant, with intermittent paranoid delusions.     Recommended Treatment: Continue with group therapy, milieu therapy and occupational therapy.      Discharge plan: plan is to discharge to his aunt's house with an ACT team established to ensure he is medication compliant and to assist transitioning into the community    Risks, benefits and possible side effects of Medications:   Risks, benefits, and possible side effects of medications explained to patient and patient verbalizes understanding.      Medications: all current active meds have been reviewed.    Labs: I have personally reviewed all pertinent laboratory/tests results.     Counseling / Coordination of Care  Total floor / unit time spent today 20 minutes. Greater than 50% of total time was spent with the patient and / or family counseling and / or coordination of care.   "

## 2024-05-14 NOTE — NURSING NOTE
Patient is visible on unit attending groups and social with peers. Pt is pleasant and cooperative with care. Pt takes all medication without issue, offers no complaints.

## 2024-05-14 NOTE — PLAN OF CARE
Problem: Alteration in Thoughts and Perception  Goal: Treatment Goal: Gain control of psychotic behaviors/thinking, reduce/eliminate presenting symptoms and demonstrate improved reality functioning upon discharge  Outcome: Progressing  Goal: Verbalize thoughts and feelings  Description: Interventions:  - Promote a nonjudgmental and trusting relationship with the patient through active listening and therapeutic communication  - Assess patient's level of functioning, behavior and potential for risk  - Engage patient in 1 on 1 interactions  - Encourage patient to express fears, feelings, frustrations, and discuss symptoms    - [place] patient to reality, help patient recognize reality-based thinking   - Administer medications as ordered and assess for potential side effects  - Provide the patient education related to the signs and symptoms of the illness and desired effects of prescribed medications  Outcome: Progressing  Goal: Refrain from acting on delusional thinking/internal stimuli  Description: Interventions:  - Monitor patient closely, per order   - Utilize least restrictive measures   - Set reasonable limits, give positive feedback for acceptable   - Administer medications as ordered and monitor of potential side effects  Outcome: Progressing  Goal: Agree to be compliant with medication regime, as prescribed and report medication side effects  Description: Interventions:  - Offer appropriate PRN medication and supervise ingestion; conduct AIMS, as needed   Outcome: Progressing  Goal: Attend and participate in unit activities, including therapeutic, recreational, and educational groups  Description: Interventions:  -Encourage Visitation and family involvement in care  Outcome: Progressing  Goal: Complete daily ADLs, including personal hygiene independently, as able  Description: Interventions:  - Observe, teach, and assist patient with ADLS  - Monitor and promote a balance of rest/activity, with adequate  nutrition and elimination   Outcome: Progressing     Problem: Ineffective Coping  Goal: Demonstrates healthy coping skills  Outcome: Progressing  Goal: Participates in unit activities  Description: Interventions:  - Provide therapeutic environment   - Provide required programming   - Redirect inappropriate behaviors   Outcome: Progressing  Goal: Patient/Family participate in treatment and DC plans  Description: Interventions:  - Provide therapeutic environment  Outcome: Progressing     Problem: Depression  Goal: Refrain from harming self  Description: Interventions:  - Monitor patient closely, per order   - Supervise medication ingestion, monitor effects and side effects   Outcome: Progressing  Goal: Refrain from isolation  Description: Interventions:  - Develop a trusting relationship   - Encourage socialization   Outcome: Progressing  Goal: Refrain from self-neglect  Outcome: Progressing  Goal: Attend and participate in unit activities, including therapeutic, recreational, and educational groups  Description: Interventions:  - Provide therapeutic and educational activities daily, encourage attendance and participation, and document same in the medical record   Outcome: Progressing     Problem: Anxiety  Goal: Anxiety is at manageable level  Description: Interventions:  - Assess and monitor patient's anxiety level.   - Monitor for signs and symptoms (heart palpitations, chest pain, shortness of breath, headaches, nausea, feeling jumpy, restlessness, irritable, apprehensive).   - Collaborate with interdisciplinary team and initiate plan and interventions as ordered.  - [place] patient to unit/surroundings  - Explain treatment plan  - Encourage participation in care  - Encourage verbalization of concerns/fears  - Identify coping mechanisms  - Assist in developing anxiety-reducing skills  - Administer/offer alternative therapies  - Limit or eliminate stimulants  Outcome: Progressing     Problem: Electroconvulsive  therapy (ECT)  Goal: Verbalizes/displays adequate comfort level or baseline comfort level  Description: Interventions:  - Encourage patient to monitor pain and request assistance  - Assess pain using appropriate pain scale  - Administer analgesics based on type and severity of pain and evaluate response  - Implement non-pharmacological measures as appropriate and evaluate response  - Consider cultural and social influences on pain and pain management  - Notify physician/advanced practitioner if interventions unsuccessful or patient reports new pain  Outcome: Progressing  Goal: Achieves maximal functionality and self care  Description: INTERVENTIONS  - Monitor swallowing and airway patency with patient fatigue and changes in neurological status  - Encourage and assist patient to increase activity and self care.   - Encourage visually impaired, hearing impaired and aphasic patients to use assistive/communication devices  Outcome: Progressing  Goal: Maintains adequate nutritional intake  Description: INTERVENTIONS:  - Monitor percentage of each meal consumed  - Identify factors contributing to decreased intake, treat as appropriate  - Assist with meals as needed  - Monitor I&O, weight, and lab values if indicated  - Obtain nutrition services referral as needed  Outcome: Progressing     Problem: DISCHARGE PLANNING - CARE MANAGEMENT  Goal: Discharge to post-acute care or home with appropriate resources  Description: INTERVENTIONS:  - Conduct assessment to determine patient/family and health care team treatment goals, and need for post-acute services based on payer coverage, community resources, and patient preferences, and barriers to discharge  - Address psychosocial, clinical, and financial barriers to discharge as identified in assessment in conjunction with the patient/family and health care team  - Arrange appropriate level of post-acute services according to patient’s   needs and preference and payer coverage in  collaboration with the physician and health care team  - Communicate with and update the patient/family, physician, and health care team regarding progress on the discharge plan  - Arrange appropriate transportation to post-acute venues  Outcome: Progressing

## 2024-05-15 PROCEDURE — 99232 SBSQ HOSP IP/OBS MODERATE 35: CPT | Performed by: PSYCHIATRY & NEUROLOGY

## 2024-05-15 RX ADMIN — SENNOSIDES AND DOCUSATE SODIUM 2 TABLET: 8.6; 5 TABLET ORAL at 17:15

## 2024-05-15 RX ADMIN — NICOTINE POLACRILEX 2 MG: 2 GUM, CHEWING ORAL at 20:22

## 2024-05-15 RX ADMIN — NICOTINE POLACRILEX 2 MG: 2 GUM, CHEWING ORAL at 08:12

## 2024-05-15 RX ADMIN — SENNOSIDES AND DOCUSATE SODIUM 2 TABLET: 8.6; 5 TABLET ORAL at 08:11

## 2024-05-15 RX ADMIN — METFORMIN HYDROCHLORIDE 500 MG: 500 TABLET ORAL at 08:11

## 2024-05-15 RX ADMIN — CLOZAPINE 500 MG: 100 TABLET ORAL at 21:04

## 2024-05-15 RX ADMIN — PROPRANOLOL HYDROCHLORIDE 10 MG: 10 TABLET ORAL at 21:11

## 2024-05-15 RX ADMIN — RISPERIDONE 2 MG: 2 TABLET, FILM COATED ORAL at 21:05

## 2024-05-15 RX ADMIN — MELATONIN TAB 3 MG 3 MG: 3 TAB at 21:05

## 2024-05-15 RX ADMIN — NICOTINE POLACRILEX 2 MG: 2 GUM, CHEWING ORAL at 14:45

## 2024-05-15 RX ADMIN — CYANOCOBALAMIN TAB 1000 MCG 1000 MCG: 1000 TAB at 08:12

## 2024-05-15 RX ADMIN — PROPRANOLOL HYDROCHLORIDE 10 MG: 10 TABLET ORAL at 08:12

## 2024-05-15 RX ADMIN — ESCITALOPRAM OXALATE 20 MG: 10 TABLET, FILM COATED ORAL at 08:12

## 2024-05-15 RX ADMIN — POLYETHYLENE GLYCOL 3350 17 G: 17 POWDER, FOR SOLUTION ORAL at 08:12

## 2024-05-15 RX ADMIN — AMLODIPINE BESYLATE 5 MG: 5 TABLET ORAL at 08:12

## 2024-05-15 NOTE — PLAN OF CARE
Problem: Alteration in Thoughts and Perception  Goal: Verbalize thoughts and feelings  Description: Interventions:  - Promote a nonjudgmental and trusting relationship with the patient through active listening and therapeutic communication  - Assess patient's level of functioning, behavior and potential for risk  - Engage patient in 1 on 1 interactions  - Encourage patient to express fears, feelings, frustrations, and discuss symptoms    - [place] patient to reality, help patient recognize reality-based thinking   - Administer medications as ordered and assess for potential side effects  - Provide the patient education related to the signs and symptoms of the illness and desired effects of prescribed medications  Outcome: Progressing  Goal: Refrain from acting on delusional thinking/internal stimuli  Description: Interventions:  - Monitor patient closely, per order   - Utilize least restrictive measures   - Set reasonable limits, give positive feedback for acceptable   - Administer medications as ordered and monitor of potential side effects  Outcome: Progressing  Goal: Agree to be compliant with medication regime, as prescribed and report medication side effects  Description: Interventions:  - Offer appropriate PRN medication and supervise ingestion; conduct AIMS, as needed   Outcome: Progressing  Goal: Complete daily ADLs, including personal hygiene independently, as able  Description: Interventions:  - Observe, teach, and assist patient with ADLS  - Monitor and promote a balance of rest/activity, with adequate nutrition and elimination   Outcome: Progressing     Problem: Ineffective Coping  Goal: Demonstrates healthy coping skills  Outcome: Progressing  Goal: Participates in unit activities  Description: Interventions:  - Provide therapeutic environment   - Provide required programming   - Redirect inappropriate behaviors   Outcome: Progressing     Problem: Depression  Goal: Verbalize thoughts and  feelings  Description: Interventions:  - Assess and re-assess patient's level of risk   - Engage patient in 1:1 interactions, daily, for a minimum of 15 minutes   - Encourage patient to express feelings, fears, frustrations, hopes   Outcome: Progressing  Goal: Refrain from harming self  Description: Interventions:  - Monitor patient closely, per order   - Supervise medication ingestion, monitor effects and side effects   Outcome: Progressing  Goal: Refrain from isolation  Description: Interventions:  - Develop a trusting relationship   - Encourage socialization   Outcome: Progressing  Goal: Refrain from self-neglect  Outcome: Progressing  Goal: Attend and participate in unit activities, including therapeutic, recreational, and educational groups  Description: Interventions:  - Provide therapeutic and educational activities daily, encourage attendance and participation, and document same in the medical record   Outcome: Progressing  Goal: Complete daily ADLs, including personal hygiene independently, as able  Description: Interventions:  - Observe, teach, and assist patient with ADLS  -  Monitor and promote a balance of rest/activity, with adequate nutrition and elimination   Outcome: Progressing     Problem: Anxiety  Goal: Anxiety is at manageable level  Description: Interventions:  - Assess and monitor patient's anxiety level.   - Monitor for signs and symptoms (heart palpitations, chest pain, shortness of breath, headaches, nausea, feeling jumpy, restlessness, irritable, apprehensive).   - Collaborate with interdisciplinary team and initiate plan and interventions as ordered.  - [place] patient to unit/surroundings  - Explain treatment plan  - Encourage participation in care  - Encourage verbalization of concerns/fears  - Identify coping mechanisms  - Assist in developing anxiety-reducing skills  - Administer/offer alternative therapies  - Limit or eliminate stimulants  Outcome: Progressing     Problem:  Electroconvulsive therapy (ECT)  Goal: Treatment Goal: Demonstrate a reduction of confusion and improved orientation to person, place, time and/or situation upon discharge, according to optimum baseline/ability  Outcome: Progressing  Goal: Verbalizes/displays adequate comfort level or baseline comfort level  Description: Interventions:  - Encourage patient to monitor pain and request assistance  - Assess pain using appropriate pain scale  - Administer analgesics based on type and severity of pain and evaluate response  - Implement non-pharmacological measures as appropriate and evaluate response  - Consider cultural and social influences on pain and pain management  - Notify physician/advanced practitioner if interventions unsuccessful or patient reports new pain  Outcome: Progressing     Problem: DISCHARGE PLANNING - CARE MANAGEMENT  Goal: Discharge to post-acute care or home with appropriate resources  Description: INTERVENTIONS:  - Conduct assessment to determine patient/family and health care team treatment goals, and need for post-acute services based on payer coverage, community resources, and patient preferences, and barriers to discharge  - Address psychosocial, clinical, and financial barriers to discharge as identified in assessment in conjunction with the patient/family and health care team  - Arrange appropriate level of post-acute services according to patient’s   needs and preference and payer coverage in collaboration with the physician and health care team  - Communicate with and update the patient/family, physician, and health care team regarding progress on the discharge plan  - Arrange appropriate transportation to post-acute venues  Outcome: Progressing

## 2024-05-15 NOTE — PLAN OF CARE
Problem: Ineffective Coping  Goal: Identifies ineffective coping skills  Outcome: Progressing  Goal: Identifies healthy coping skills  Outcome: Progressing  Goal: Demonstrates healthy coping skills  Outcome: Progressing  Goal: Participates in unit activities  Description: Interventions:  - Provide therapeutic environment   - Provide required programming   - Redirect inappropriate behaviors   Outcome: Progressing   Alberto attended 14/20 groups offered during the past 2 days.

## 2024-05-15 NOTE — NURSING NOTE
"Alberto has been awake, alert, and visible intermittently out in the milieu.  Pt ate 100% supper.  Pt social with select peers.  Pt polite and cooperative.  Affect flat, blunted.  Pt rated his depression a #4/10,  anxiety a #1/4, and continues to hear voices \"all the time\"  telling him that they are going to kill him and his family.  Pt has not verbalized any delusions.  Pt spends time resting in room and socializing in pt lounge area with peers.  Pt attended and participated in coping skills group, evening nursing group, wrap up group, and had snack after with peers. Pt requested prn Nicotine gum and 2 mg po given at 2022.   Pt cooperative with taking scheduled medications as ordered and without incident.  Pt maintained on continuous safety checks.  Will continue to monitor/assess for any changes in behavior.  "

## 2024-05-15 NOTE — PROGRESS NOTES
Progress Note - Behavioral Health   Alberto Berumen 28 y.o. male MRN: [id number]  Unit/Bed#: [facility] 101-02 Encounter: [id number]    Assessment & Plan   Principal Problem:    Schizoaffective disorder, bipolar type ([facility])  Active Problems:    GERD (gastroesophageal reflux disease)    Medical clearance for psychiatric admission    Tobacco abuse    T wave inversion in EKG    Chronic idiopathic constipation    Confluent and reticulate papillomatosis    Primary hypertension    Elevated hemoglobin A1c      Behavior over the last 24 hours:  improved  Sleep: normal  Appetite: normal  Medication side effects: No  ROS: no complaints    Subjective: Patient is a 28 year old male, with schizoaffective disorder, bipolar type. He attended 8/11 groups yesterday. Nursing staff notes that the auditory hallucinations remain constant but are not bothersome. Nursing notes he is more engaging, pleasant, and social on the unit. He reports feelings of depression intermittently and randomly. He denies SI, feelings of guilt or hopelessness. Denies feelings of anxiety. He reports that he is feeling scared about the possibility of being discharged in 1-2 months; he notes that he may ask if he can stay longer due to fear of the voices and not being able to get out of his head as easily as he does here. He reports that he enjoys going to group therapy; especially art therapy where he is currently excited about the project he is working on. He was found awake and out of bed, participating in morning walk.     Assessment: He is medication complaint, participatory in group therapy, and expresses benefit to manage the auditory hallucinations with use of ECT. He constantly hears multiple voices and reports feeling intermittently paranoid about the voices.     Mental Status Evaluation:  Appearance:  age appropriate and casually dressed. Appropriate hygiene, well-groomed. He is wearing a grey patient gown with a black hooded sweatshirt, and [facility]  "pants with light blue crocs. He has black hair in dreadlocks.    Behavior:  psychomotor retardation. Cooperative, pleasant, joyful, and calm. Makes good eye contact   Speech:  increased latency of response. No speech impediment. Normal tone, normal pitch, normal volume.    Mood:  \"Tired\"   Affect:  mood-congruent. Affect is stable. Appropriate affect to thought content   Thought Process:  goal directed and logical   Associations: intact associations   Thought Content:  delusions  persecutory. He states occasional paranoia toward the voices that tell him they will harm him and his family. Denies any experiences leading to grandiose, somatic, or bizarre delusions. Denies SI, HI, phobias, obsessions, compulsions, or distorted body perceptions. Does not appear to be hypervigilant   Perceptual Disturbances: Auditory hallucinations without commands. He notes he constantly hears multiple voices telling him they will kill him and his family. Denies visual, tactile, gustatory, or olfactory hallucinations. Denies feelings of depersonalization, derealization, or illusions. Does not appear to be responding to internal stimuli.    Risk Potential: Suicidal Ideations none  Homicidal Ideations none  Potential for Aggression No   Sensorium:  person, place, time/date, situation, and day of week   Memory:  recent memory intact, remote memory intact, immediate memory intact  Immediate: able to recall 3 unrelated words without error (shoe, purple, barn)  Recent: able to recall what he for dinner and for breakfast; able to recall the 3 unrelated words 5 minutes later with some guidance; able to relay who the current [place] president is  Remote: able to express the name of his middle school    Consciousness:  alert    Attention: attention span and concentration were age appropriate  Attention span: able to repeat a phone number  Concentration: able to spell \"world\" backward and forwards   Intelligence: Fund of general knowledge: able to " "recall the [facility]  Calculation ability: able to perform simple multiplication (9x3,9x4,9x5)  Abstract interpretation: able to express similarity between arm and leg (\"both body parts\")  Problem solving: able to relay why the moon appears larger than the stars  Estimate of intelligence: average    Insight:  age appropriate: he is aware of his diagnosis and the need for therapy   Judgment: age appropriate: he appropriately expresses that he would find the nearest exit if someone yelled fire in a theatre  Social judgment: good; he is dressed appropriately and acts appropriately   Impulse control: Does not appear to have poor impulse control   Gait/Station: normal gait/station and slow   Motor Activity: no abnormal movements     Progress Toward Goals: Patient is improving in his goals. He notes that ECT allows him to get out of his head and away from the auditory hallucinations. He is medication compliant, and attends more than 50% of group therapy. The auditory hallucinations remain to tell him that they will kill him and his family     Recommended Treatment: Continue with group therapy, milieu therapy and occupational therapy.      Discharge plan: plan is to discharge to his aunt's house with ACT team established to ensure he is medication compliant and to aid in transitioning him into the community.     Risks, benefits and possible side effects of Medications:   Risks, benefits, and possible side effects of medications explained to patient and patient verbalizes understanding.      Medications: all current active meds have been reviewed.    Labs: I have personally reviewed all pertinent laboratory/tests results.     Counseling / Coordination of Care  Total floor / unit time spent today 20 minutes. Greater than 50% of total time was spent with the patient and / or family counseling and / or coordination of care.   "

## 2024-05-15 NOTE — NURSING NOTE
Pt calm and pleasant brightens on approach. Pt visible in milieu social with peers and attending group. Pt responding to internal stimuli. Pt cooperative with care and medication compliant.

## 2024-05-15 NOTE — PROGRESS NOTES
05/15/24 0739   Team Meeting   Meeting Type Daily Rounds   Team Members Present   Team Members Present Physician;Nurse;;Other (Discipline and Name)   Patient/Family Present   Patient Present No   Patient's Family Present No     In attendance:  Dr. Alex Thomas, MD Dr. Jordan Holter, HOLLI Weiss, RN  Luisana Alvarado, [facility]  Irais Mcdowell M.S.    Groups: 8/11    Pt denies symptoms other than ongoing voices. Pt reports that he is dealing ok with the voices. Pt is polite and talkative.

## 2024-05-16 PROCEDURE — 99232 SBSQ HOSP IP/OBS MODERATE 35: CPT | Performed by: PSYCHIATRY & NEUROLOGY

## 2024-05-16 RX ADMIN — NICOTINE POLACRILEX 2 MG: 2 GUM, CHEWING ORAL at 21:17

## 2024-05-16 RX ADMIN — SENNOSIDES AND DOCUSATE SODIUM 2 TABLET: 8.6; 5 TABLET ORAL at 17:05

## 2024-05-16 RX ADMIN — MELATONIN TAB 3 MG 3 MG: 3 TAB at 21:15

## 2024-05-16 RX ADMIN — POLYETHYLENE GLYCOL 3350 17 G: 17 POWDER, FOR SOLUTION ORAL at 08:06

## 2024-05-16 RX ADMIN — SENNOSIDES AND DOCUSATE SODIUM 2 TABLET: 8.6; 5 TABLET ORAL at 08:07

## 2024-05-16 RX ADMIN — AMLODIPINE BESYLATE 5 MG: 5 TABLET ORAL at 08:06

## 2024-05-16 RX ADMIN — PROPRANOLOL HYDROCHLORIDE 10 MG: 10 TABLET ORAL at 08:07

## 2024-05-16 RX ADMIN — PROPRANOLOL HYDROCHLORIDE 10 MG: 10 TABLET ORAL at 21:14

## 2024-05-16 RX ADMIN — NICOTINE POLACRILEX 2 MG: 2 GUM, CHEWING ORAL at 17:17

## 2024-05-16 RX ADMIN — NICOTINE POLACRILEX 2 MG: 2 GUM, CHEWING ORAL at 12:15

## 2024-05-16 RX ADMIN — NICOTINE POLACRILEX 2 MG: 2 GUM, CHEWING ORAL at 08:05

## 2024-05-16 RX ADMIN — METFORMIN HYDROCHLORIDE 500 MG: 500 TABLET ORAL at 08:06

## 2024-05-16 RX ADMIN — CLOZAPINE 500 MG: 100 TABLET ORAL at 21:15

## 2024-05-16 RX ADMIN — RISPERIDONE 2 MG: 2 TABLET, FILM COATED ORAL at 21:15

## 2024-05-16 RX ADMIN — ESCITALOPRAM OXALATE 20 MG: 10 TABLET, FILM COATED ORAL at 08:06

## 2024-05-16 RX ADMIN — CYANOCOBALAMIN TAB 1000 MCG 1000 MCG: 1000 TAB at 08:07

## 2024-05-16 NOTE — NURSING NOTE
Patient calm and cooperative at this time. He denies SI/HI, but endorses AVH. States that he is used to the voices though, and they don't bother him anymore. He is out in the milieu, playing cards with select peers. Pleasant and polite when approached. Requests for gum at 0805 and 1215, and given at those times. Denies any unmet needs at this this time, will continue to monitor and accommodate him where possible.

## 2024-05-16 NOTE — PROGRESS NOTES
Psychiatry Progress Note [facility]    Alberto Berumen 28 y.o. male MRN: [id number]  Unit/Bed#: [facility] 101-02 Encounter: [id number]  Code Status: Level 1 - Full Code    PCP: Joce Juan MD    Date of Admission:  3/29/2024 2008   Date of Service:  05/16/24    Patient Active Problem List   Diagnosis    GERD (gastroesophageal reflux disease)    Medical clearance for psychiatric admission    Schizoaffective disorder, bipolar type (HCC)    Tobacco abuse    T wave inversion in EKG    Syringoma    Chronic idiopathic constipation    Vitamin B 12 deficiency    Vitamin D deficiency    Confluent and reticulate papillomatosis    Class 2 obesity in adult    Primary hypertension    Elevated hemoglobin A1c     Review of systems: Unremarkable   diagnosis: Schizoaffective bipolar  assessment  Overall Status: Continues to report same voices threatening to kill him and his family but continues to hear the ECT has admitted it helped him to stay out of his head.  Stays to himself with limited interaction with peers wearing hospital clothing not aggressive or agitated compliant with medications   certification Statement: The patient will continue to require additional inpatient hospital stay due to ongoing voices that are threatening to kill him and his family lack of response to medications and ECT so far.     Medications: Clozapine 500 mg at bedtime, propranolol 10 mg every 12 hours as as needed for anxiety, Risperdal 2 mg at bedtime Lexapro 20 mg once a day, atropine eyedrops sublingual for drooling of saliva and senna 2 tablets twice a day for constipation  All medications reviewed and recommended to be continued for symptom management  Side effects from treatment: None reported  Medication changes   None today   medication education   Risks side effects benefits and precautions of medications discussed with patient and he did verbalize an understanding about risks for metabolic syndrome from being on  neuroleptics and is form tardive dyskinesia etc. and special precautions about being on clozapine   Understanding of medications: Has some understanding   Justification for dual anti-psychotics: Not applicable    Non-pharmacological treatments  Continue with individual, group, milieu and occupational therapy using recovery principles and psycho-education about accepting illness and the need for treatment.   to contact aunt and explore ACT team referral which he never had  ECT to be continued  weekly for maintenance starting on 4/12/2024  Refuses to see a dietician and agrees to do more exercises as he is about 100 lbs overweight     Safety  Safety and communication plan established to target dynamic risk factors discussed above.    Discharge Plan   Back to his aunt with an ACT team    Interval Progress   Still somewhat isolated withdrawn staying on hands and in his room most of the time and blames missing breakfast in the mornings.  Clozapine level 565 on 5/13/2024.  Blood work unremarkable.  Continues to report to hear same voices threatening to kill him and his family and he continues to report ECT is continuing to help him stay to stay out of his head claiming to have lessened the voices but he still hears them all the time and he wants more ECTs which is being given as once a week now.  Not aggressive or agitated or threatening or self-abusive and is polite friendly pleasant when approached wearing hospital clothing with short hair in dreadlocks and minimal interaction with peers  I Acceptance by patient: Accepting  Hopefulness in recovery: Living with his aunt  Involved in reintegration process: Talking to his aunt and sister  Trusting in relationship with psychiatrist: Trusting  Sleep: Good  Appetite: Good  Compliance with Medications: Good  Group attendance: Most of the groups 2/9  Significant events: Continues to hear voices threatening to kill him and his family but reports he is taking his and  helping him    Mental Status Exam  Appearance: age appropriate, improved grooming, looks older than stated age, overweight, with hair in dreadlocks casually dressed with a clean shaven face, fairly groomed with good eye contact pacing the hallway wide awake wearing hospital clothing as usual   behavior: cooperative, mildly anxious, evasive, gesturing, slow responses.  Again appearing preoccupied   speech: normal rate, normal volume, normal pitch  Mood: dysphoric, anxious, reports he feels good appearing preoccupied and anxious affect: constricted, inappropriate, mood-congruent  Thought Process: organized, logical, coherent, goal directed, linear, decreased rate of thoughts, slowing of thoughts, negative thinking, impaired abstract reasoning, concrete  Thought Content: paranoid ideation, some paranoia, grandiose ideas, intrusive thoughts, preoccupied, chronic, continues to report paranoia about people threatening to kill him and his family because of the voices.  He believes ECT has helped so that he is not much in his head.  No other delusions elicited.  No current suicidal or homicidal thoughts and no plans verbalized.  No phobias obsessions compulsions or distorted body perceptions reported.  Preoccupied with wanting to get ECTs more often despite reminding him that it may impair his memory  Perceptual Disturbances: Continues to report hearing voices that threaten to kill him and his family but states ECTs have helped him to stay out of his head and reports the voices are not commanding at all  Hx Risk Factors: chronic psychiatric problems, chronic anxiety symptoms, chronic psychotic symptoms, his aunt possibly moving away  Sensorium: Oriented x 3 spheres and situation  Cognition: recent and remote memory grossly intact  Consciousness: alert and awake  Attention: attention span and concentration are age appropriate  Intellect: appears to be of average intelligence  Insight: intact  Judgement: intact  Motor  Activity: no abnormal movements     Vitals  Temp:  [97.7 °F (36.5 °C)-98.2 °F (36.8 °C)] 97.7 °F (36.5 °C)  HR:  [] 100  Resp:  [18] 18  BP: (132-170)/(80-87) 140/80  SpO2:  [100 %] 100 %    Intake/Output Summary (Last 24 hours) at 5/16/2024 0424  Last data filed at 5/15/2024 1734  Gross per 24 hour   Intake 0 ml   Output --   Net 0 ml               Lab Results: All Labs For Current Hospital Admission Reviewed     Current Facility-Administered Medications   Medication Dose Route Frequency Provider Last Rate    acetaminophen  650 mg Oral Q4H PRN Jordan C Holter, DO      acetaminophen  650 mg Oral Q6H PRN HOLLI Lion      aluminum-magnesium hydroxide-simethicone  30 mL Oral Q4H PRN Jordan C Holter,       amLODIPine  5 mg Oral Daily HOLLI Lion      Artificial Tears  1 drop Both Eyes Q3H PRN Jordan C Holter, DO      atropine  1 drop Sublingual HS HOLLI Lion      atropine  1 drop Sublingual Daily PRN HOLLI Lion      haloperidol lactate  2.5 mg Intramuscular Q4H PRN Max 4/day STEVE LionNP      And    LORazepam  1 mg Intramuscular Q4H PRN Max 4/day STEVE LionNP      And    benztropine  0.5 mg Intramuscular Q4H PRN Max 4/day HOLLI Lion      benztropine  1 mg Intramuscular Q4H PRN Max 6/day Jordan C Holter, DO      haloperidol lactate  5 mg Intramuscular Q4H PRN Max 4/day STEVE LionNP      And    LORazepam  2 mg Intramuscular Q4H PRN Max 4/day HOLLI Lion      And    benztropine  1 mg Intramuscular Q4H PRN Max 4/day HOLLI Lion      benztropine  1 mg Oral Q4H PRN Max 6/day HOLLI Lion      benztropine  1 mg Oral Q4H PRN Max 6/day Jordan C Holter,       bisacodyl  10 mg Rectal Daily PRN HOLLI Lion      cloZAPine  500 mg Oral HS Bora Rosario MD      cyanocobalamin  1,000 mcg Oral Daily HOLLI Galvan      hydrOXYzine HCL  50 mg Oral Q6H PRN Max 4/day Jordan C Holter, DO      Or    diphenhydrAMINE  50 mg Intramuscular  Q6H PRN [facility] Holter, DO      hydrOXYzine HCL  50 mg Oral Q6H PRN Max 4/day HOLLI Lion      Or    diphenhydrAMINE  50 mg Intramuscular Q6H PRN HOLLI Lion      diphenhydrAMINE-zinc acetate   Topical BID PRN HOLLI Lion      ergocalciferol  50,000 Units Oral Weekly HOLLI Galvan      escitalopram  20 mg Oral Daily HOLLI Lion      haloperidol  1 mg Oral Q6H PRN HOLLI Lion      haloperidol  2.5 mg Oral Q4H PRN Max 4/day HOLLI Lion      haloperidol  5 mg Oral Q4H PRN Max 4/day HOLLI Lion      hydrocortisone   Topical 4x Daily PRN HOLLI Lion      hydrOXYzine HCL  100 mg Oral Q6H PRN Max 4/day Ion  Holter, DO      Or    LORazepam  2 mg Intramuscular Q6H PRN Ion  Holter, DO      hydrOXYzine HCL  100 mg Oral Q6H PRN Max 4/day HOLLI Lion      Or    LORazepam  2 mg Intramuscular Q6H PRN HOLLI Lion      hydrOXYzine HCL  25 mg Oral Q6H PRN Max 4/day [facility] Holter, DO      ibuprofen  600 mg Oral Q8H PRN HOLLI Lion      melatonin  3 mg Oral HS [facility] Holter, DO      metFORMIN  500 mg Oral Daily With Breakfast HOLLI Lion      methocarbamol  500 mg Oral Q6H PRN HOLLI Lion      nicotine polacrilex  2 mg Oral Q4H PRN Bora Rosario MD      OLANZapine  5 mg Oral Q4H PRN Max 3/day [facility] Holter, DO      Or    OLANZapine  2.5 mg Intramuscular Q4H PRN Max 3/day [facility] Holter, DO      OLANZapine  5 mg Oral Q3H PRN Max 3/day [facility] Holter, DO      Or    OLANZapine  5 mg Intramuscular Q3H PRN Max 3/day [facility] Holter, DO      OLANZapine  2.5 mg Oral Q4H PRN Max 6/day [facility] Holter, DO      ondansetron  4 mg Oral Q6H PRN HOLLI Lion      polyethylene glycol  17 g Oral Daily HOLLI Lion      polyethylene glycol  17 g Oral Daily PRN [facility] Holter, DO      propranolol  10 mg Oral Q12H KAYLIE HOLLI Lion      risperiDONE  2 mg Oral HS Bora Rosario MD      senna-docusate sodium  1 tablet  Oral Daily PRN Jordan C Holter, DO senna-docusate sodium  2 tablet Oral BID HOLLI Lion      traZODone  50 mg Oral HS PRN HOLLI Lion      white petrolatum-mineral oil   Topical TID PRN HOLLI Lion         Counseling / Coordination of Care: Total floor / unit time spent today 15 minutes. Greater than 50% of total time was spent with the patient and / or family counseling and / or somewhat receptive to supportive listening and teaching positive coping skills to deal with symptom mangement.     Patient's Rights, confidentiality and exceptions to confidentiality, use of automated medical record, [facility] staff access to medical record, and consent to treatment reviewed.    This note has been dictated and hence there may be problems with punctuation, spelling and formatting and if anyone has any concerns please address them to Dr. Rosario   This note is not shared with patient due to potential for making patient's condition worse by knowing the content of the note.

## 2024-05-16 NOTE — PROGRESS NOTES
05/16/24 0733   Team Meeting   Meeting Type Daily Rounds   Team Members Present   Team Members Present Physician;Nurse;;Other (Discipline and Name)   Patient/Family Present   Patient Present No   Patient's Family Present No     In attendance:  Dr. Alex Thomas, MD Dr. Jordan Holter, HOLLI Weiss, RN  Luisana Alvarado, [facility]  Irais Mcdowell M.S.    Groups: 6/9    Pt is bright, calm and pleasant. Pt reports ongoing voices. Pt will have ECT Friday.

## 2024-05-16 NOTE — NURSING NOTE
"Alberto has been awake, alert, and visible intermittently out in the milieu.  Pt ate 100% supper.  Pt social with select peers.  Pt polite, calm,  and cooperative.  Affect flat, blunted.  Pt  rated his depression a #4/10, anxiety a #1/4.  Pt stated that he hears \"voices every day\"  and that the say that they \"are going to kill me and my family\".  Pt stated that they are not bothersome to him.  Pt has not verbalized any delusions.  Pt requested prn Nicotine gum and 2 mg po given a 1717 and 2117.  Pt spends time socializing with peers and playing cards with good interaction noted.  Pt reminded that he has ECT tomorrow AM and that he must shower tonight and pt stated that he did shower.  Pt attended and participated in evening coping skills group, wrap up group, and had snack after with peers.  Pt cooperative with taking scheduled medications as ordered and without incident.  Pt maintained on continuous safety checks.  Will continue to monitor/assess for any changes in behavior.  "

## 2024-05-16 NOTE — PLAN OF CARE
Problem: Alteration in Thoughts and Perception  Goal: Treatment Goal: Gain control of psychotic behaviors/thinking, reduce/eliminate presenting symptoms and demonstrate improved reality functioning upon discharge  Outcome: Progressing  Goal: Verbalize thoughts and feelings  Description: Interventions:  - Promote a nonjudgmental and trusting relationship with the patient through active listening and therapeutic communication  - Assess patient's level of functioning, behavior and potential for risk  - Engage patient in 1 on 1 interactions  - Encourage patient to express fears, feelings, frustrations, and discuss symptoms    - [place] patient to reality, help patient recognize reality-based thinking   - Administer medications as ordered and assess for potential side effects  - Provide the patient education related to the signs and symptoms of the illness and desired effects of prescribed medications  Outcome: Progressing  Goal: Agree to be compliant with medication regime, as prescribed and report medication side effects  Description: Interventions:  - Offer appropriate PRN medication and supervise ingestion; conduct AIMS, as needed   Outcome: Progressing  Goal: Attend and participate in unit activities, including therapeutic, recreational, and educational groups  Description: Interventions:  -Encourage Visitation and family involvement in care  Outcome: Progressing  Goal: Complete daily ADLs, including personal hygiene independently, as able  Description: Interventions:  - Observe, teach, and assist patient with ADLS  - Monitor and promote a balance of rest/activity, with adequate nutrition and elimination   Outcome: Progressing     Problem: Ineffective Coping  Goal: Demonstrates healthy coping skills  Outcome: Progressing  Goal: Participates in unit activities  Description: Interventions:  - Provide therapeutic environment   - Provide required programming   - Redirect inappropriate behaviors   Outcome: Progressing      Problem: Depression  Goal: Treatment Goal: Demonstrate behavioral control of depressive symptoms, verbalize feelings of improved mood/affect, and adopt new coping skills prior to discharge  Outcome: Progressing  Goal: Verbalize thoughts and feelings  Description: Interventions:  - Assess and re-assess patient's level of risk   - Engage patient in 1:1 interactions, daily, for a minimum of 15 minutes   - Encourage patient to express feelings, fears, frustrations, hopes   Outcome: Progressing  Goal: Refrain from harming self  Description: Interventions:  - Monitor patient closely, per order   - Supervise medication ingestion, monitor effects and side effects   Outcome: Progressing  Goal: Refrain from isolation  Description: Interventions:  - Develop a trusting relationship   - Encourage socialization   Outcome: Progressing  Goal: Refrain from self-neglect  Outcome: Progressing  Goal: Attend and participate in unit activities, including therapeutic, recreational, and educational groups  Description: Interventions:  - Provide therapeutic and educational activities daily, encourage attendance and participation, and document same in the medical record   Outcome: Progressing  Goal: Complete daily ADLs, including personal hygiene independently, as able  Description: Interventions:  - Observe, teach, and assist patient with ADLS  -  Monitor and promote a balance of rest/activity, with adequate nutrition and elimination   Outcome: Progressing     Problem: Anxiety  Goal: Anxiety is at manageable level  Description: Interventions:  - Assess and monitor patient's anxiety level.   - Monitor for signs and symptoms (heart palpitations, chest pain, shortness of breath, headaches, nausea, feeling jumpy, restlessness, irritable, apprehensive).   - Collaborate with interdisciplinary team and initiate plan and interventions as ordered.  - [place] patient to unit/surroundings  - Explain treatment plan  - Encourage participation in  care  - Encourage verbalization of concerns/fears  - Identify coping mechanisms  - Assist in developing anxiety-reducing skills  - Administer/offer alternative therapies  - Limit or eliminate stimulants  Outcome: Progressing     Problem: Alteration in Orientation  Goal: Interact with staff daily  Description: Interventions:  - Assess and re-assess patient's level of orientation  - Engage patient in 1 on 1 interactions, daily, for a minimum of 15 minutes   - Establish rapport/trust with patient   Outcome: Progressing  Goal: Cooperate with recommended testing/procedures  Description: Interventions:  - Determine need for ancillary testing  - Observe for mental status changes  - Implement falls/precaution protocol   Outcome: Progressing

## 2024-05-16 NOTE — SOCIAL WORK
SW checked in with pt.  Pt was playing cards with peers and reports no current concerns. Pt is bright and social with peers. SW inquired about recent communication with family. Pt reports that he would like to schedule virtual visits with his family again but is unsure of their work schedules at this time. SW reiterated process for having those visits scheduled so pt can reach out once he obtains information.   Pt declined any current concerns. SW encouraged continued participation and acknowledged pt's progress.

## 2024-05-17 ENCOUNTER — ANESTHESIA EVENT (INPATIENT)
Dept: PREOP/PACU | Facility: HOSPITAL | Age: 29
DRG: 750 | End: 2024-05-17
Payer: COMMERCIAL

## 2024-05-17 ENCOUNTER — APPOINTMENT (INPATIENT)
Dept: PREOP/PACU | Facility: HOSPITAL | Age: 29
DRG: 750 | End: 2024-05-17
Attending: PSYCHIATRY & NEUROLOGY
Payer: COMMERCIAL

## 2024-05-17 ENCOUNTER — ANESTHESIA (INPATIENT)
Dept: PREOP/PACU | Facility: HOSPITAL | Age: 29
DRG: 750 | End: 2024-05-17
Payer: COMMERCIAL

## 2024-05-17 PROCEDURE — 90870 ELECTROCONVULSIVE THERAPY: CPT

## 2024-05-17 PROCEDURE — 99232 SBSQ HOSP IP/OBS MODERATE 35: CPT | Performed by: PSYCHIATRY & NEUROLOGY

## 2024-05-17 PROCEDURE — 90870 ELECTROCONVULSIVE THERAPY: CPT | Performed by: STUDENT IN AN ORGANIZED HEALTH CARE EDUCATION/TRAINING PROGRAM

## 2024-05-17 PROCEDURE — GZB2ZZZ ELECTROCONVULSIVE THERAPY, BILATERAL-SINGLE SEIZURE: ICD-10-PCS | Performed by: STUDENT IN AN ORGANIZED HEALTH CARE EDUCATION/TRAINING PROGRAM

## 2024-05-17 RX ORDER — ETOMIDATE 2 MG/ML
INJECTION INTRAVENOUS AS NEEDED
Status: DISCONTINUED | OUTPATIENT
Start: 2024-05-17 | End: 2024-05-17

## 2024-05-17 RX ORDER — LABETALOL HYDROCHLORIDE 5 MG/ML
INJECTION, SOLUTION INTRAVENOUS AS NEEDED
Status: DISCONTINUED | OUTPATIENT
Start: 2024-05-17 | End: 2024-05-17

## 2024-05-17 RX ORDER — GLYCOPYRROLATE 0.2 MG/ML
INJECTION INTRAMUSCULAR; INTRAVENOUS AS NEEDED
Status: DISCONTINUED | OUTPATIENT
Start: 2024-05-17 | End: 2024-05-17

## 2024-05-17 RX ORDER — MIDAZOLAM HYDROCHLORIDE 2 MG/2ML
INJECTION, SOLUTION INTRAMUSCULAR; INTRAVENOUS AS NEEDED
Status: DISCONTINUED | OUTPATIENT
Start: 2024-05-17 | End: 2024-05-17

## 2024-05-17 RX ORDER — KETOROLAC TROMETHAMINE 30 MG/ML
INJECTION, SOLUTION INTRAMUSCULAR; INTRAVENOUS AS NEEDED
Status: DISCONTINUED | OUTPATIENT
Start: 2024-05-17 | End: 2024-05-17

## 2024-05-17 RX ORDER — SUCCINYLCHOLINE/SOD CL,ISO/PF 100 MG/5ML
SYRINGE (ML) INTRAVENOUS AS NEEDED
Status: DISCONTINUED | OUTPATIENT
Start: 2024-05-17 | End: 2024-05-17

## 2024-05-17 RX ORDER — ESMOLOL HYDROCHLORIDE 10 MG/ML
INJECTION INTRAVENOUS AS NEEDED
Status: DISCONTINUED | OUTPATIENT
Start: 2024-05-17 | End: 2024-05-17

## 2024-05-17 RX ORDER — HYDRALAZINE HYDROCHLORIDE 20 MG/ML
INJECTION INTRAMUSCULAR; INTRAVENOUS AS NEEDED
Status: DISCONTINUED | OUTPATIENT
Start: 2024-05-17 | End: 2024-05-17

## 2024-05-17 RX ADMIN — HYDRALAZINE HYDROCHLORIDE 10 MG: 20 INJECTION INTRAMUSCULAR; INTRAVENOUS at 06:53

## 2024-05-17 RX ADMIN — POLYETHYLENE GLYCOL 3350 17 G: 17 POWDER, FOR SOLUTION ORAL at 09:59

## 2024-05-17 RX ADMIN — MIDAZOLAM HYDROCHLORIDE 2 MG: 1 INJECTION, SOLUTION INTRAMUSCULAR; INTRAVENOUS at 06:56

## 2024-05-17 RX ADMIN — ETOMIDATE 20 MG: 2 INJECTION INTRAVENOUS at 06:52

## 2024-05-17 RX ADMIN — RISPERIDONE 2 MG: 2 TABLET, FILM COATED ORAL at 21:31

## 2024-05-17 RX ADMIN — SENNOSIDES AND DOCUSATE SODIUM 2 TABLET: 8.6; 5 TABLET ORAL at 17:17

## 2024-05-17 RX ADMIN — ESCITALOPRAM OXALATE 20 MG: 10 TABLET, FILM COATED ORAL at 09:59

## 2024-05-17 RX ADMIN — GLYCOPYRROLATE 0.4 MG: 0.2 INJECTION, SOLUTION INTRAMUSCULAR; INTRAVENOUS at 06:48

## 2024-05-17 RX ADMIN — NICOTINE POLACRILEX 2 MG: 2 GUM, CHEWING ORAL at 15:13

## 2024-05-17 RX ADMIN — NICOTINE POLACRILEX 2 MG: 2 GUM, CHEWING ORAL at 20:40

## 2024-05-17 RX ADMIN — CLOZAPINE 500 MG: 100 TABLET ORAL at 21:32

## 2024-05-17 RX ADMIN — Medication 140 MG: at 06:52

## 2024-05-17 RX ADMIN — METFORMIN HYDROCHLORIDE 500 MG: 500 TABLET ORAL at 09:59

## 2024-05-17 RX ADMIN — PROPRANOLOL HYDROCHLORIDE 10 MG: 10 TABLET ORAL at 05:28

## 2024-05-17 RX ADMIN — PROPRANOLOL HYDROCHLORIDE 10 MG: 10 TABLET ORAL at 20:40

## 2024-05-17 RX ADMIN — KETOROLAC TROMETHAMINE 30 MG: 30 INJECTION, SOLUTION INTRAMUSCULAR; INTRAVENOUS at 06:48

## 2024-05-17 RX ADMIN — ESMOLOL HYDROCHLORIDE 50 MG: 10 INJECTION, SOLUTION INTRAVENOUS at 06:52

## 2024-05-17 RX ADMIN — LABETALOL HYDROCHLORIDE 20 MG: 5 INJECTION, SOLUTION INTRAVENOUS at 06:52

## 2024-05-17 RX ADMIN — AMLODIPINE BESYLATE 5 MG: 5 TABLET ORAL at 05:30

## 2024-05-17 RX ADMIN — CYANOCOBALAMIN TAB 1000 MCG 1000 MCG: 1000 TAB at 09:59

## 2024-05-17 RX ADMIN — MELATONIN TAB 3 MG 3 MG: 3 TAB at 21:31

## 2024-05-17 RX ADMIN — SENNOSIDES AND DOCUSATE SODIUM 2 TABLET: 8.6; 5 TABLET ORAL at 09:59

## 2024-05-17 RX ADMIN — ATROPINE SULFATE 1 DROP: 10 SOLUTION/ DROPS OPHTHALMIC at 21:31

## 2024-05-17 NOTE — ANESTHESIA PREPROCEDURE EVALUATION
Procedure:  ELECTROCONVULSIVE THERAPY (ECT)    Relevant Problems   CARDIO   (+) Primary hypertension      GI/HEPATIC   (+) GERD (gastroesophageal reflux disease)        Physical Exam    Airway    Mallampati score: II  TM Distance: >3 FB  Neck ROM: full     Dental       Cardiovascular  Cardiovascular exam normal    Pulmonary  Pulmonary exam normal     Other Findings        Anesthesia Plan  ASA Score- 2     Anesthesia Type- general with ASA Monitors.         Additional Monitors:     Airway Plan:            Plan Factors-Exercise tolerance (METS): >4 METS.    Chart reviewed. EKG reviewed.  Existing labs reviewed. Patient summary reviewed.                  Induction- intravenous.    Postoperative Plan-     Perioperative Resuscitation Plan - Level 1 - Full Code.       Informed Consent- Anesthetic plan and risks discussed with patient.  I personally reviewed this patient with the CRNA. Discussed and agreed on the Anesthesia Plan with the CRNA..      Lab Results   Component Value Date    HGBA1C 5.0 04/01/2024       Lab Results   Component Value Date    K 4.1 04/01/2024     04/01/2024    CO2 27 04/01/2024    BUN 12 04/01/2024    CREATININE 0.90 04/01/2024    GLUF 97 01/11/2024    CALCIUM 9.3 04/01/2024    AST 23 04/01/2024    ALT 53 (H) 04/01/2024    ALKPHOS 84 04/01/2024    EGFR 116 04/01/2024       Lab Results   Component Value Date    WBC 8.77 05/14/2024    HGB 12.3 05/14/2024    HCT 41.7 05/14/2024    MCV 81 (L) 05/14/2024     05/14/2024     Sinus tachycardia  Nonspecific T wave abnormality  Abnormal ECG  When compared with ECG of 11-MAR-2024 15:41, (unconfirmed)  Sinus rhythm has replaced Ectopic atrial rhythm  QRS axis Shifted left  Confirmed by Angel Lara ([id number]) on 3/11/2024 5:11:48 PM      Specimen Collected: 03/11/24 15:42

## 2024-05-17 NOTE — PROGRESS NOTES
Psychiatry Progress Note [facility]    Alberto Berumen 28 y.o. male MRN: [id number]  Unit/Bed#: [facility] 101-02 Encounter: [id number]  Code Status: Level 1 - Full Code    PCP: Joce Juan MD    Date of Admission:  3/29/2024 2008   Date of Service:  05/17/24    Patient Active Problem List   Diagnosis    GERD (gastroesophageal reflux disease)    Medical clearance for psychiatric admission    Schizoaffective disorder, bipolar type (HCC)    Tobacco abuse    T wave inversion in EKG    Syringoma    Chronic idiopathic constipation    Vitamin B 12 deficiency    Vitamin D deficiency    Confluent and reticulate papillomatosis    Class 2 obesity in adult    Primary hypertension    Elevated hemoglobin A1c     Review of systems: Unremarkable   diagnosis: Schizoaffective bipolar  assessment  Overall Status: Patient had his maintenance weekly ECT this morning and still claims to hear the same voices threatening to kill him and his family but insists ECT has definitely helped to decrease the voices so that he is not too much in his head.    Certification Statement: The patient will continue to require additional inpatient hospital stay due to ongoing voices that are threatening to kill him and his family lack of response to medications and ECT so far.     Medications: Clozapine 500 mg at bedtime, propranolol 10 mg every 12 hours as as needed for anxiety, Risperdal 2 mg at bedtime Lexapro 20 mg once a day, atropine eyedrops sublingual for drooling of saliva and senna 2 tablets twice a day for constipation  All medications reviewed and I recommend that they be continued for symptom management   side effects from treatment: None reported  Medication changes   None today   medication education   Risks side effects benefits and precautions of medications discussed with patient and he did verbalize an understanding about risks for metabolic syndrome from being on neuroleptics and is form tardive dyskinesia etc. and  special precautions about being on clozapine   Understanding of medications: Has some understanding   Justification for dual anti-psychotics: Not applicable    Non-pharmacological treatments  Continue with individual, group, milieu and occupational therapy using recovery principles and psycho-education about accepting illness and the need for treatment.   to contact aunt and explore ACT team referral which he never had  ECT to be continued  weekly for maintenance starting on 4/12/2024  Refuses to see a dietician and agrees to do more exercises as he is about 100 lbs overweight     Safety  Safety and communication plan established to target dynamic risk factors discussed above.    Discharge Plan   Back to his aunt with an ACT team    Interval Progress   Continues to be isolated withdrawn dressed in hospital clothing staying to himself with minimal interaction with peers.  Continues to report to hear the same voices that are threatening to kill him and his family which he has been hearing for more than 2 years he believes the ECTs have definitely helped subdue them so that he is able to sit still out of his head rather than think about them all the time.  He is in fact asking for more ECTs as he believes that has helped him more.  Not aggressive or agitated or threatening or self-abusive and remains polite friendly pleasant when approached.  I Acceptance by patient: Accepting  Hopefulness in recovery: Living with his aunt  Involved in reintegration process: Talking to his aunt and sister  Trusting in relationship with psychiatrist: Trusting  Sleep: Good  Appetite: Good  Compliance with Medications: Good  Group attendance: Most of the groups 11/11  Significant events: Hearing voices threatening to kill him and his family but reports ECTs have definitely helped him decrease the intensity of the voices    Mental Status Exam  Appearance: age appropriate, improved grooming, looks older than stated age,  overweight, with hair in dreadlocks casually dressed with a clean shaven face, fairly groomed with good eye contact found laying on bed under the covers sleeping from aftereffects of the anesthesia for ECT   behavior: cooperative, mildly anxious, evasive, gesturing, slow responses.   speech: normal rate, normal volume, normal pitch  Mood: dysphoric, anxious, reports he feels good   affect: constricted, inappropriate, mood-congruent  Thought Process: organized, logical, coherent, goal directed, linear, decreased rate of thoughts, slowing of thoughts, negative thinking, impaired abstract reasoning, concrete  Thought Content: paranoid ideation, some paranoia, grandiose ideas, intrusive thoughts, preoccupied, chronic, continues to report paranoia about people threatening to kill him and his family because of the voices.  He believes ECT has helped so that he is not much in his head.  No other delusions elicited.  No current suicidal or homicidal thoughts and no plans verbalized.  No phobias obsessions compulsions or distorted body perceptions reported.  Preoccupied with wanting to get ECTs more often despite reminding him that it may impair his memory  Perceptual Disturbances: Still with same voices that threaten to kill him and his family and not commanding but continues to report ECT is definitely helped with the voices so he can stay out of his head  Hx Risk Factors: chronic psychiatric problems, chronic anxiety symptoms, chronic psychotic symptoms, his aunt possibly moving away  Sensorium: Oriented x 3 spheres and situation  Cognition: recent and remote memory grossly intact  Consciousness: alert and awake  Attention: attention span and concentration are age appropriate  Intellect: appears to be of average intelligence  Insight: intact  Judgement: intact  Motor Activity: no abnormal movements     Vitals  Temp:  [97 °F (36.1 °C)-97.8 °F (36.6 °C)] 97.6 °F (36.4 °C)  HR:  [] 96  Resp:  [12-20] 18  BP:  (127-186)/() 157/88  SpO2:  [92 %-100 %] 97 %    Intake/Output Summary (Last 24 hours) at 5/17/2024 0810  Last data filed at 5/16/2024 1642  Gross per 24 hour   Intake 0 ml   Output --   Net 0 ml               Lab Results: All Labs For Current Hospital Admission Reviewed     Current Facility-Administered Medications   Medication Dose Route Frequency Provider Last Rate    acetaminophen  650 mg Oral Q4H PRN Jordan C Holter, DO      acetaminophen  650 mg Oral Q6H PRN HOLLI Lion      aluminum-magnesium hydroxide-simethicone  30 mL Oral Q4H PRN Jordan C Holter,       amLODIPine  5 mg Oral Daily HOLLI Lion      Artificial Tears  1 drop Both Eyes Q3H PRN Jordan C Holter,       atropine  1 drop Sublingual HS HOLLI Lion      atropine  1 drop Sublingual Daily PRN HOLLI Lion      haloperidol lactate  2.5 mg Intramuscular Q4H PRN Max 4/day HOLLI Lion      And    LORazepam  1 mg Intramuscular Q4H PRN Max 4/day HOLLI Lion      And    benztropine  0.5 mg Intramuscular Q4H PRN Max 4/day HOLLI Lion      benztropine  1 mg Intramuscular Q4H PRN Max 6/day Jordan C Holter, DO      haloperidol lactate  5 mg Intramuscular Q4H PRN Max 4/day HOLLI Lion      And    LORazepam  2 mg Intramuscular Q4H PRN Max 4/day HOLLI Lion      And    benztropine  1 mg Intramuscular Q4H PRN Max 4/day HOLLI Lion      benztropine  1 mg Oral Q4H PRN Max 6/day HOLLI Lion      benztropine  1 mg Oral Q4H PRN Max 6/day Jordan C Holter, DO      bisacodyl  10 mg Rectal Daily PRN HOLLI Lion      cloZAPine  500 mg Oral HS Bora Rosario MD      cyanocobalamin  1,000 mcg Oral Daily HOLLI Galvan      hydrOXYzine HCL  50 mg Oral Q6H PRN Max 4/day Jordan C Holter, DO      Or    diphenhydrAMINE  50 mg Intramuscular Q6H PRN Jordan C Holter,       hydrOXYzine HCL  50 mg Oral Q6H PRN Max 4/day HOLLI Lion      Or    diphenhydrAMINE  50 mg  Intramuscular Q6H PRN HOLLI Lion      diphenhydrAMINE-zinc acetate   Topical BID PRN EvelineHOLLI Christy      ergocalciferol  50,000 Units Oral Weekly HOLLI Galvan      escitalopram  20 mg Oral Daily HOLLI Lion      haloperidol  1 mg Oral Q6H PRN EvelineHOLLI Christy      haloperidol  2.5 mg Oral Q4H PRN Max 4/day HOLLI Lion      haloperidol  5 mg Oral Q4H PRN Max 4/day HOLLI Lion      hydrocortisone   Topical 4x Daily PRN HOLLI Lion      hydrOXYzine HCL  100 mg Oral Q6H PRN Max 4/day [facility] Holter, DO      Or    LORazepam  2 mg Intramuscular Q6H PRN [facility] Holter, DO      hydrOXYzine HCL  100 mg Oral Q6H PRN Max 4/day HOLLI Lion      Or    LORazepam  2 mg Intramuscular Q6H PRN HOLLI Lion      hydrOXYzine HCL  25 mg Oral Q6H PRN Max 4/day [facility] Holter, DO      ibuprofen  600 mg Oral Q8H PRN HOLLI Lion      melatonin  3 mg Oral HS [facility] Holter, DO      metFORMIN  500 mg Oral Daily With Breakfast HOLLI Lion      methocarbamol  500 mg Oral Q6H PRN EvelineHOLLI Christy      nicotine polacrilex  2 mg Oral Q4H PRN Bora Rosario MD      OLANZapine  5 mg Oral Q4H PRN Max 3/day [facility] Holter, DO      Or    OLANZapine  2.5 mg Intramuscular Q4H PRN Max 3/day [facility] Holter, DO      OLANZapine  5 mg Oral Q3H PRN Max 3/day [facility] Holter, DO      Or    OLANZapine  5 mg Intramuscular Q3H PRN Max 3/day [facility] Holter, DO      OLANZapine  2.5 mg Oral Q4H PRN Max 6/day [facility] Holter, DO      ondansetron  4 mg Oral Q6H PRN HOLLI Lion      polyethylene glycol  17 g Oral Daily HOLLI Lion      polyethylene glycol  17 g Oral Daily PRN [facility] Holter, DO      propranolol  10 mg Oral Q12H KAYLIE HOLLI Lion      risperiDONE  2 mg Oral HS Bora Rosario MD      senna-docusate sodium  1 tablet Oral Daily PRN Jordan C Holter, DO senna-docusate sodium  2 tablet Oral BID HOLLI Lion      traZODone  50 mg Oral HS PRN  HOLLI Lion      white petrolatum-mineral oil   Topical TID PRN HOLLI Lion         Counseling / Coordination of Care: Total floor / unit time spent today 15 minutes. Greater than 50% of total time was spent with the patient and / or family counseling and / or somewhat receptive to supportive listening and teaching positive coping skills to deal with symptom mangement.     Patient's Rights, confidentiality and exceptions to confidentiality, use of automated medical record, [facility] staff access to medical record, and consent to treatment reviewed.    This note has been dictated and hence there may be problems with punctuation, spelling and formatting and if anyone has any concerns please address them to Dr. Rosario   This note is not shared with patient due to potential for making patient's condition worse by knowing the content of the note.

## 2024-05-17 NOTE — NURSING NOTE
Received pt in bed at change of shift   closed; chest movement noted.  Maintained NPO for ECT procedure this am. Q 7 mins safety checks in progress.     0609:  Transported to PACU via WC at this time by our MHT for his ECT procedure.

## 2024-05-17 NOTE — NURSING NOTE
"Patient had ect this am. Patient slept through breakfast. He did wake for lunch. He states \"I am always tired when I have ect.\" Patient states he still has auditory hallucinations but are improved. He denied anxiety and depression. Compliant with medications. Patient has been visible and social this afternoon.   "

## 2024-05-17 NOTE — PROCEDURES
Procedure Note - ECT  Alberto Berumen 28 y.o. male MRN: [id number]    Patient is unchanged today.  He remains guarded, scant, but cooperative with interview.  He reports that he feels ECT is helping him.  He is blunted in affect but reports that his still somewhat depressed and anxious.  He denies any SI, HI, or visual hallucinations.  He endorses vague auditory hallucinations but notes that these are not currently bothering him.  He denies headaches from ECT but does endorse some memory impairment.  He reports that he is not bothered by the memory impairment.  Patient agreeable to proceed with ECT treatment this morning.    Time out was taken with staff to confirm correct patient and correct procedure to be performed.    Session Number: Maintenance    Diagnosis: Principal Problem:    Schizoaffective disorder, bipolar type ([facility])  Active Problems:    GERD (gastroesophageal reflux disease)    Medical clearance for psychiatric admission    Tobacco abuse    T wave inversion in EKG    Chronic idiopathic constipation    Confluent and reticulate papillomatosis    Primary hypertension    Elevated hemoglobin A1c      ECT Type: Inpatient, Maintenance    Anesthesia: Etomidate    Electrode Placement: bilateral    Energy level: 100%      Seizure Duration     EE sec  EM sec observed, 15 sec recorded by machine    PSI 78.7%     Results:Clinical seizure was satisfactory, Patient tolerated ECT well    Vitals:    24 0730   BP: 148/97   Pulse: 96   Resp: 16   Temp:    SpO2: 93%        Medication Administration - last 24 hours from 2024 0736 to 2024 0736         Date/Time Order Dose Route Action Action by     2024 0530 EDT amLODIPine (NORVASC) tablet 5 mg 5 mg Oral Given Mireya Pearson RN     2024 0806 EDT amLODIPine (NORVASC) tablet 5 mg 5 mg Oral Given Guy Taylor     2024 2110 EDT atropine (ISOPTO ATROPINE) 1 % ophthalmic solution 1 drop 1 drop Sublingual Not Given Paolo Smalls RN      05/16/2024 0806 EDT escitalopram (LEXAPRO) tablet 20 mg 20 mg Oral Given [facility]     05/16/2024 0806 EDT metFORMIN (GLUCOPHAGE) tablet 500 mg 500 mg Oral Given [facility]     05/16/2024 0806 EDT polyethylene glycol (MIRALAX) packet 17 g 17 g Oral Given [facility]     05/17/2024 0528 EDT propranolol (INDERAL) tablet 10 mg 10 mg Oral Given Mireya Pearson, RN     05/16/2024 2114 EDT propranolol (INDERAL) tablet 10 mg 10 mg Oral Given Paolo Smalls, RN     05/16/2024 0807 EDT propranolol (INDERAL) tablet 10 mg 10 mg Oral Given [facility]     05/16/2024 1705 EDT senna-docusate sodium (SENOKOT S) 8.6-50 mg per tablet 2 tablet 2 tablet Oral Given Paolo Smalls, RN     05/16/2024 0807 EDT senna-docusate sodium (SENOKOT S) 8.6-50 mg per tablet 2 tablet 2 tablet Oral Given [facility]     05/16/2024 2115 EDT melatonin tablet 3 mg 3 mg Oral Given Paolo Smalls, RN     05/16/2024 0807 EDT cyanocobalamin (VITAMIN B-12) tablet 1,000 mcg 1,000 mcg Oral Given [facility]     05/16/2024 2115 EDT risperiDONE (RisperDAL) tablet 2 mg 2 mg Oral Given Paolo Smalls, RN     05/16/2024 2115 EDT cloZAPine (CLOZARIL) tablet 500 mg 500 mg Oral Given Paolo Smalls, RN     05/16/2024 2117 EDT nicotine polacrilex (NICORETTE) gum 2 mg 2 mg Oral Given Paolo Smalls, RN     05/16/2024 1717 EDT nicotine polacrilex (NICORETTE) gum 2 mg 2 mg Oral Given Paolo Smalls, RN     05/16/2024 1215 EDT nicotine polacrilex (NICORETTE) gum 2 mg 2 mg Oral Given Genet Horner, RN     05/16/2024 0805 EDT nicotine polacrilex (NICORETTE) gum 2 mg 2 mg Oral Given Guy Claudia             Media Information      Document Information    Clinical Image - Mobile Device   EEG   05/17/2024 06:57   Attached To:   Hospital Encounter on 3/29/24   Source Information    Guy Arroyo MD  [facility]   Document History        Guy Arroyo MD 05/17/24

## 2024-05-17 NOTE — PLAN OF CARE
Problem: Alteration in Thoughts and Perception  Goal: Treatment Goal: Gain control of psychotic behaviors/thinking, reduce/eliminate presenting symptoms and demonstrate improved reality functioning upon discharge  Outcome: Progressing  Goal: Verbalize thoughts and feelings  Description: Interventions:  - Promote a nonjudgmental and trusting relationship with the patient through active listening and therapeutic communication  - Assess patient's level of functioning, behavior and potential for risk  - Engage patient in 1 on 1 interactions  - Encourage patient to express fears, feelings, frustrations, and discuss symptoms    - [place] patient to reality, help patient recognize reality-based thinking   - Administer medications as ordered and assess for potential side effects  - Provide the patient education related to the signs and symptoms of the illness and desired effects of prescribed medications  Outcome: Progressing  Goal: Refrain from acting on delusional thinking/internal stimuli  Description: Interventions:  - Monitor patient closely, per order   - Utilize least restrictive measures   - Set reasonable limits, give positive feedback for acceptable   - Administer medications as ordered and monitor of potential side effects  Outcome: Progressing  Goal: Agree to be compliant with medication regime, as prescribed and report medication side effects  Description: Interventions:  - Offer appropriate PRN medication and supervise ingestion; conduct AIMS, as needed   Outcome: Progressing  Goal: Attend and participate in unit activities, including therapeutic, recreational, and educational groups  Description: Interventions:  -Encourage Visitation and family involvement in care  Outcome: Progressing  Goal: Complete daily ADLs, including personal hygiene independently, as able  Description: Interventions:  - Observe, teach, and assist patient with ADLS  - Monitor and promote a balance of rest/activity, with adequate  nutrition and elimination   Outcome: Progressing     Problem: Ineffective Coping  Goal: Demonstrates healthy coping skills  Outcome: Progressing  Goal: Participates in unit activities  Description: Interventions:  - Provide therapeutic environment   - Provide required programming   - Redirect inappropriate behaviors   Outcome: Progressing  Goal: Patient/Family participate in treatment and DC plans  Description: Interventions:  - Provide therapeutic environment  Outcome: Progressing     Problem: Depression  Goal: Treatment Goal: Demonstrate behavioral control of depressive symptoms, verbalize feelings of improved mood/affect, and adopt new coping skills prior to discharge  Outcome: Progressing  Goal: Verbalize thoughts and feelings  Description: Interventions:  - Assess and re-assess patient's level of risk   - Engage patient in 1:1 interactions, daily, for a minimum of 15 minutes   - Encourage patient to express feelings, fears, frustrations, hopes   Outcome: Progressing  Goal: Refrain from harming self  Description: Interventions:  - Monitor patient closely, per order   - Supervise medication ingestion, monitor effects and side effects   Outcome: Progressing  Goal: Refrain from isolation  Description: Interventions:  - Develop a trusting relationship   - Encourage socialization   Outcome: Progressing  Goal: Refrain from self-neglect  Outcome: Progressing  Goal: Attend and participate in unit activities, including therapeutic, recreational, and educational groups  Description: Interventions:  - Provide therapeutic and educational activities daily, encourage attendance and participation, and document same in the medical record   Outcome: Progressing  Goal: Complete daily ADLs, including personal hygiene independently, as able  Description: Interventions:  - Observe, teach, and assist patient with ADLS  -  Monitor and promote a balance of rest/activity, with adequate nutrition and elimination   Outcome: Progressing      Problem: Alteration in Orientation  Goal: Attend and participate in unit activities, including therapeutic, recreational, and educational groups  Description: Interventions:  - Provide therapeutic and educational activities daily, encourage attendance and participation, and document same in the medical record   - Provide appropriate opportunities for reminiscence   - Provide a consistent daily routine   - Encourage family contact/visitation   Outcome: Progressing     Problem: Electroconvulsive therapy (ECT)  Goal: Treatment Goal: Demonstrate a reduction of confusion and improved orientation to person, place, time and/or situation upon discharge, according to optimum baseline/ability  Outcome: Progressing     Problem: DISCHARGE PLANNING - CARE MANAGEMENT  Goal: Discharge to post-acute care or home with appropriate resources  Description: INTERVENTIONS:  - Conduct assessment to determine patient/family and health care team treatment goals, and need for post-acute services based on payer coverage, community resources, and patient preferences, and barriers to discharge  - Address psychosocial, clinical, and financial barriers to discharge as identified in assessment in conjunction with the patient/family and health care team  - Arrange appropriate level of post-acute services according to patient’s   needs and preference and payer coverage in collaboration with the physician and health care team  - Communicate with and update the patient/family, physician, and health care team regarding progress on the discharge plan  - Arrange appropriate transportation to post-acute venues  Outcome: Progressing

## 2024-05-17 NOTE — PROGRESS NOTES
"Progress Note - Behavioral Health     Alberto Berumen 28 y.o. male MRN: [id number]  Unit/Bed#: formerly [facility] 101-02 Encounter: [id number]      Alberto Berumen was seen for continuing care and reviewed with treatment team.  Pt was watching TV on approach and cooperative for interview.  His facial expression was serious and almost grave, though he was pleasant and reported that his depression was a little bit better.  He also reported his anxiety was decreased--rating 6 1/2 / 10.  His AH are still as frequent but he finds he can dismiss the voices a bit easier.  He admits to the same paranoid belief that somebody is out to get him but nobody specific and he does not refer to anyone on the unit.  Pt presently denies SI, HI, VH, TH, or OH.  He feels his treatment is going pretty well but would like to have ECT more often.  He reports some mild short-term memory loss that occurs right after ECT sessions, but otherwise tolerates it well.  No present somatic complaints.  He reported sleeping and eating \"Good\" and has no specific future plans other than \"To be happy.\"        Per EMR and floor team, Pt has been calm, cooperative, and medication compliant.  He is visible and social with his peers, and attending therapeutic groups with appropriate behavior.          Sleep:Good  Appetite: Good   Medication side effects: None per Pt    ROS: No complaints per Pt, (All ROS Negative)    Labs/Tests: Reviewed    Mental Status Evaluation:  Appearance:  Dressed, Fair hygiene, partial eye contact   Behavior:  Calm, cooperative, pleasant, Withdrawn   Speech:  Clear, normal rate and volume   Mood:  Less anxious, Less depressed   Affect:  Blunted   Thought Process:  Organized, Goal directed   Associations: Intact associations   Thought Content:  Paranoid delusions   Perceptual Disturbances: + AH, and paranoia.  No VH, TH, or OH.  He does not appear RIS during interview   Risk Potential: Pt presently denies SI or HI    Sensorium:  Self, birthday, " Place, Day of the week, Month, Year   Memory:  recent memory intact   Consciousness:  alert, awake   Attention: Good   Insight:  Fair   Judgment: Good   Gait/Station: Normal gait/station   Motor Activity: No abnormal movements     Vitals:    05/17/24 0759 05/17/24 1629 05/17/24 2036 05/18/24 0920   BP: 157/88 130/76 122/70 147/86   BP Location: Right arm Right arm Right arm Right arm   Pulse: 96 97 100 (!) 110   Resp: 18 18 18   Temp: 97.6 °F (36.4 °C) 98.2 °F (36.8 °C)  98.1 °F (36.7 °C)   TempSrc: Temporal Temporal  Skin   SpO2: 97% 98%     Weight:       Height:         Labwork: I have personally reviewed all pertinent laboratory/tests results.  Recent Results (from the past 160 hour(s))   Clozapine-[facility]    Collection Time: 05/13/24  7:24 PM   Result Value Ref Range    Clozapine Lvl 565 350 - 900 ng/mL   CK    Collection Time: 05/13/24  7:24 PM   Result Value Ref Range    Total  39 - 308 U/L   C-reactive protein    Collection Time: 05/13/24  7:24 PM   Result Value Ref Range    CRP 9.1 (H) <3.0 mg/L   B-Type Natriuretic Peptide(BNP)    Collection Time: 05/13/24  7:24 PM   Result Value Ref Range    BNP 17 0 - 100 pg/mL   CBC and differential    Collection Time: 05/14/24  5:56 AM   Result Value Ref Range    WBC 8.77 4.31 - 10.16 Thousand/uL    RBC 5.18 3.88 - 5.62 Million/uL    Hemoglobin 12.3 12.0 - 17.0 g/dL    Hematocrit 41.7 36.5 - 49.3 %    MCV 81 (L) 82 - 98 fL    MCH 23.7 (L) 26.8 - 34.3 pg    MCHC 29.5 (L) 31.4 - 37.4 g/dL    RDW 14.9 11.6 - 15.1 %    MPV 10.7 8.9 - 12.7 fL    Platelets 263 149 - 390 Thousands/uL    nRBC 0 /100 WBCs    Segmented % 51 43 - 75 %    Immature Grans % 1 0 - 2 %    Lymphocytes % 35 14 - 44 %    Monocytes % 9 4 - 12 %    Eosinophils Relative 3 0 - 6 %    Basophils Relative 1 0 - 1 %    Absolute Neutrophils 4.54 1.85 - 7.62 Thousands/µL    Absolute Immature Grans 0.04 0.00 - 0.20 Thousand/uL    Absolute Lymphocytes 3.06 0.60 - 4.47 Thousands/µL    Absolute Monocytes 0.78 0.17  - 1.22 Thousand/µL    Eosinophils Absolute 0.30 0.00 - 0.61 Thousand/µL    Basophils Absolute 0.05 0.00 - 0.10 Thousands/µL        Progress Toward Goals:  Based on today's interview and review of prior notes, Pt is making gradual improvement.  Continue the present medication regimen unchanged  Continue maintenance ECT  Weekly CBC with differential, Clozapine level, CRP, CK, BNP  Pt remains on dual antipsychotic Tx (Clozapine, Risperidone) due to failure on monotherapy        Assessment & Plan   Principal Problem:    Schizoaffective disorder, bipolar type (HCC)  Active Problems:    GERD (gastroesophageal reflux disease)    Medical clearance for psychiatric admission    Tobacco abuse    T wave inversion in EKG    Chronic idiopathic constipation    Confluent and reticulate papillomatosis    Primary hypertension    Elevated hemoglobin A1c      Recommended Treatment: Continue with pharmacotherapy, group therapy, milieu therapy and occupational therapy.  The patient will be maintained on the following medications:  Current Facility-Administered Medications   Medication Dose Route Frequency Provider Last Rate    acetaminophen  650 mg Oral Q4H PRN Jordan C Holter, DO      acetaminophen  650 mg Oral Q6H PRN HOLLI Lion      aluminum-magnesium hydroxide-simethicone  30 mL Oral Q4H PRN Jordan C Holter, DO      amLODIPine  5 mg Oral Daily HOLLI Lion      Artificial Tears  1 drop Both Eyes Q3H PRN Jordan C Holter, DO      atropine  1 drop Sublingual HS HOLLI Lion      atropine  1 drop Sublingual Daily PRN HOLLI Lion      haloperidol lactate  2.5 mg Intramuscular Q4H PRN Max 4/day HOLLI Lion      And    LORazepam  1 mg Intramuscular Q4H PRN Max 4/day HOLLI Lion      And    benztropine  0.5 mg Intramuscular Q4H PRN Max 4/day HOLLI Lion      benztropine  1 mg Intramuscular Q4H PRN Max 6/day Jordan C Holter, DO      haloperidol lactate  5 mg Intramuscular Q4H PRN Max 4/day Eveline  HOLLI Hunt      And    LORazepam  2 mg Intramuscular Q4H PRN Max 4/day HOLLI Lion      And    benztropine  1 mg Intramuscular Q4H PRN Max 4/day HOLLI Lion      benztropine  1 mg Oral Q4H PRN Max 6/day HOLLI Lion      benztropine  1 mg Oral Q4H PRN Max 6/day [facility] Holter, DO      bisacodyl  10 mg Rectal Daily PRN HOLLI Lion      cloZAPine  500 mg Oral HS Bora Rosario MD      cyanocobalamin  1,000 mcg Oral Daily HOLLI Galvan      hydrOXYzine HCL  50 mg Oral Q6H PRN Max 4/day [facility] Holter, DO      Or    diphenhydrAMINE  50 mg Intramuscular Q6H PRN [facility] Holter, DO      hydrOXYzine HCL  50 mg Oral Q6H PRN Max 4/day HOLLI Lion      Or    diphenhydrAMINE  50 mg Intramuscular Q6H PRN HOLLI Lion      diphenhydrAMINE-zinc acetate   Topical BID PRN HOLLI Lion      ergocalciferol  50,000 Units Oral Weekly HOLLI Galvan      escitalopram  20 mg Oral Daily HOLLI Lion      haloperidol  1 mg Oral Q6H PRN HOLLI Lion      haloperidol  2.5 mg Oral Q4H PRN Max 4/day HOLLI Lion      haloperidol  5 mg Oral Q4H PRN Max 4/day HOLLI Lion      hydrocortisone   Topical 4x Daily PRN HOLLI Lion      hydrOXYzine HCL  100 mg Oral Q6H PRN Max 4/day [facility] Holter, DO      Or    LORazepam  2 mg Intramuscular Q6H PRN [facility] Holter, DO      hydrOXYzine HCL  100 mg Oral Q6H PRN Max 4/day HOLLI Lion      Or    LORazepam  2 mg Intramuscular Q6H PRN HOLLI Lion      hydrOXYzine HCL  25 mg Oral Q6H PRN Max 4/day [facility] Holter, DO      ibuprofen  600 mg Oral Q8H PRN HOLLI Lion      melatonin  3 mg Oral HS [facility] Holter, DO      metFORMIN  500 mg Oral Daily With Breakfast HOLLI Lion      methocarbamol  500 mg Oral Q6H PRN HOLLI Lion      nicotine polacrilex  2 mg Oral Q4H PRN Bora Rosario MD      OLANZapine  5 mg Oral Q4H PRN Max 3/day Jordan C Holter, DO      Or    OLANZapine   2.5 mg Intramuscular Q4H PRN Max 3/day Ion Dupontter, DO      OLANZapine  5 mg Oral Q3H PRN Max 3/day Ion FISCHER Holter, DO      Or    OLANZapine  5 mg Intramuscular Q3H PRN Max 3/day Ion FISCHER Holter, DO      OLANZapine  2.5 mg Oral Q4H PRN Max 6/day Ion Dupontter, DO      ondansetron  4 mg Oral Q6H PRN HOLLI Lion      polyethylene glycol  17 g Oral Daily HOLLI Lion      polyethylene glycol  17 g Oral Daily PRN Jordan C Holter, DO      propranolol  10 mg Oral Q12H KAYLIE HOLLI Lion      risperiDONE  2 mg Oral HS Bora Rosario MD      senna-docusate sodium  1 tablet Oral Daily PRN Jordan C Holter, DO      senna-docusate sodium  2 tablet Oral BID HOLLI Lion      traZODone  50 mg Oral HS PRN HOLLI Lion      white petrolatum-mineral oil   Topical TID PRN HOLLI Lion         Risks, benefits and possible side effects of Medications:   Risks, benefits, and possible side effects of medications explained to patient and patient verbalizes understanding

## 2024-05-18 PROCEDURE — 99232 SBSQ HOSP IP/OBS MODERATE 35: CPT | Performed by: PHYSICIAN ASSISTANT

## 2024-05-18 RX ADMIN — POLYETHYLENE GLYCOL 3350 17 G: 17 POWDER, FOR SOLUTION ORAL at 08:24

## 2024-05-18 RX ADMIN — NICOTINE POLACRILEX 2 MG: 2 GUM, CHEWING ORAL at 18:17

## 2024-05-18 RX ADMIN — PROPRANOLOL HYDROCHLORIDE 10 MG: 10 TABLET ORAL at 09:23

## 2024-05-18 RX ADMIN — PROPRANOLOL HYDROCHLORIDE 10 MG: 10 TABLET ORAL at 21:07

## 2024-05-18 RX ADMIN — CYANOCOBALAMIN TAB 1000 MCG 1000 MCG: 1000 TAB at 08:22

## 2024-05-18 RX ADMIN — METFORMIN HYDROCHLORIDE 500 MG: 500 TABLET ORAL at 08:22

## 2024-05-18 RX ADMIN — NICOTINE POLACRILEX 2 MG: 2 GUM, CHEWING ORAL at 14:10

## 2024-05-18 RX ADMIN — SENNOSIDES AND DOCUSATE SODIUM 2 TABLET: 8.6; 5 TABLET ORAL at 08:22

## 2024-05-18 RX ADMIN — ESCITALOPRAM OXALATE 20 MG: 10 TABLET, FILM COATED ORAL at 08:24

## 2024-05-18 RX ADMIN — AMLODIPINE BESYLATE 5 MG: 5 TABLET ORAL at 09:22

## 2024-05-18 RX ADMIN — MELATONIN TAB 3 MG 3 MG: 3 TAB at 21:06

## 2024-05-18 RX ADMIN — SENNOSIDES AND DOCUSATE SODIUM 2 TABLET: 8.6; 5 TABLET ORAL at 17:02

## 2024-05-18 RX ADMIN — CLOZAPINE 500 MG: 100 TABLET ORAL at 21:07

## 2024-05-18 RX ADMIN — RISPERIDONE 2 MG: 2 TABLET, FILM COATED ORAL at 21:07

## 2024-05-18 RX ADMIN — NICOTINE POLACRILEX 2 MG: 2 GUM, CHEWING ORAL at 09:24

## 2024-05-18 NOTE — NURSING NOTE
Patient is calm and cooperative. Visible and social w/ peers. , recheck 104. Med and meal compliant. Attending some groups. Continuous rounding maintained.

## 2024-05-18 NOTE — PROGRESS NOTES
"Progress Note - Behavioral Health     Alberto Berumen 28 y.o. male MRN: [id number]  Unit/Bed#: [facility] 101-02 Encounter: [id number]      Alberto Berumen was seen for continuing care and reviewed with treatment team.  Pt was cooperative on approach and immediately asked if he could have ECT more often due to his depression and ongoing hallucinations.  He reports the depressed mood is worse on some days and better on others.  His AH get louder on some days which instigates paranoid ideations that people are out to get him.  He reports being a \"tiny bit anxious\" but does not identify any particular trigger.  Patient denied any present SI, HI, CAH, VH, TH, OH.  He reported sleeping and eating \"pretty well\" to which nursing concurred.         Per EMR and floor team, Pt has remained calm, cooperative, and medication compliant through the weekend.  He is visible and social in the milieu, attending most therapeutic group activities with appropriate behavior among peers.  His BPs were a bit elevated in recent days but this morning's reading was 130/84 mmHg.    Sleep:Good  Appetite: Good   Medication side effects: None per Pt    ROS: No complaints per Pt, (All ROS Negative)    Labs/Tests: Reviewed    Mental Status Evaluation:  Appearance:  Fair hygiene, partial eye contact , dressed in hospital attire   Behavior:  Calm, cooperative, pleasant, Withdrawn   Speech:  Clear, normal rate and volume   Mood:  Anxious, Depressed   Affect:  Blunted   Thought Process:  Organized, Goal directed   Associations: Intact associations   Thought Content:  Paranoid delusions   Perceptual Disturbances: +AH and paranoia, but no CAH or VH, TH, or OH.  Pt appears mildly preoccupied at times but no RIS during interview      Risk Potential: Pt presently denies SI or HI    Sensorium:  Self, birthday, Place, Day of the week, Month, Year    Memory:  short term memory grossly intact   Consciousness:  alert, awake   Attention: Good   Insight:  Fair "   Judgment: Good   Gait/Station: Normal gait/station   Motor Activity: No abnormal movements     Vitals:    05/18/24 1522 05/18/24 1747 05/18/24 2016 05/19/24 0803   BP: 151/96 146/98 145/90 130/84   BP Location: Right arm Left arm Left arm Left arm   Pulse: 96 102 104 90   Resp: 20 20 18   Temp: 97.5 °F (36.4 °C)  97.5 °F (36.4 °C) 97.8 °F (36.6 °C)   TempSrc: Temporal  Temporal Temporal   SpO2: 98%  98% 99%   Weight:       Height:           Progress Toward Goals:  Based on today's interview and review of prior notes, no change through the weekend, but Pt is requesting an increase in ECT sessions per week to address his symptoms.  Continue the present medication regimen unchanged  Continue maintenance ECT at this time, primary team physician to have a further discussion with Pt regarding frequency of ECT Txs  Clozapine level, CBC with differential, CRP, CK, and BMP weekly  Pt remains on dual antipsychotic Tx due to failure on monotherapy        Assessment & Plan   Principal Problem:    Schizoaffective disorder, bipolar type (HCC)  Active Problems:    GERD (gastroesophageal reflux disease)    Medical clearance for psychiatric admission    Tobacco abuse    T wave inversion in EKG    Chronic idiopathic constipation    Confluent and reticulate papillomatosis    Primary hypertension    Elevated hemoglobin A1c      Recommended Treatment: Continue with pharmacotherapy, group therapy, milieu therapy and occupational therapy.  The patient will be maintained on the following medications:  Current Facility-Administered Medications   Medication Dose Route Frequency Provider Last Rate    acetaminophen  650 mg Oral Q4H PRN Jordan C Holter, DO      acetaminophen  650 mg Oral Q6H PRN HOLLI Lion      aluminum-magnesium hydroxide-simethicone  30 mL Oral Q4H PRN Jordan C Holter, DO      amLODIPine  5 mg Oral Daily HOLLI Lion      Artificial Tears  1 drop Both Eyes Q3H PRN Jordan C Holter,       atropine  1 drop  Sublingual HS HOLLI Lion      atropine  1 drop Sublingual Daily PRN HOLLI Lion      haloperidol lactate  2.5 mg Intramuscular Q4H PRN Max 4/day HOLLI Lion      And    LORazepam  1 mg Intramuscular Q4H PRN Max 4/day STEVE LionNP      And    benztropine  0.5 mg Intramuscular Q4H PRN Max 4/day HOLLI Lion      benztropine  1 mg Intramuscular Q4H PRN Max 6/day Ion FISCHER Holter, DO      haloperidol lactate  5 mg Intramuscular Q4H PRN Max 4/day HOLLI Lion      And    LORazepam  2 mg Intramuscular Q4H PRN Max 4/day HOLLI Lion      And    benztropine  1 mg Intramuscular Q4H PRN Max 4/day HOLLI Lion      benztropine  1 mg Oral Q4H PRN Max 6/day HOLLI Lion      benztropine  1 mg Oral Q4H PRN Max 6/day Jordan C Holter, DO      bisacodyl  10 mg Rectal Daily PRN HOLLI Lion      cloZAPine  500 mg Oral HS Bora Rosario MD      cyanocobalamin  1,000 mcg Oral Daily HOLLI Galvan      hydrOXYzine HCL  50 mg Oral Q6H PRN Max 4/day Jordan C Holter,       Or    diphenhydrAMINE  50 mg Intramuscular Q6H PRN Jordan C Holter, DO      hydrOXYzine HCL  50 mg Oral Q6H PRN Max 4/day HOLLI Lion      Or    diphenhydrAMINE  50 mg Intramuscular Q6H PRN HOLLI Lion      diphenhydrAMINE-zinc acetate   Topical BID PRN HOLLI Lion      ergocalciferol  50,000 Units Oral Weekly HOLLI Galvan      escitalopram  20 mg Oral Daily HOLLI Lion      haloperidol  1 mg Oral Q6H PRN HOLLI Lion      haloperidol  2.5 mg Oral Q4H PRN Max 4/day HOLLI Lion      haloperidol  5 mg Oral Q4H PRN Max 4/day HOLLI Lion      hydrocortisone   Topical 4x Daily PRN HOLLI Lion      hydrOXYzine HCL  100 mg Oral Q6H PRN Max 4/day Jordan C Holter, DO      Or    LORazepam  2 mg Intramuscular Q6H PRN Jordan C Holter, DO      hydrOXYzine HCL  100 mg Oral Q6H PRN Max 4/day HOLLI Lion      Or    LORazepam  2 mg  Intramuscular Q6H PRN HOLLI Lion      hydrOXYzine HCL  25 mg Oral Q6H PRN Max 4/day [facility] Holter, DO      ibuprofen  600 mg Oral Q8H PRN HOLLI Lion      melatonin  3 mg Oral HS [facility] Holter, DO      metFORMIN  500 mg Oral Daily With Breakfast HOLLI Lion      methocarbamol  500 mg Oral Q6H PRN HOLLI Lion      nicotine polacrilex  2 mg Oral Q4H PRN Bora Rosario MD      OLANZapine  5 mg Oral Q4H PRN Max 3/day [facility] Holter, DO      Or    OLANZapine  2.5 mg Intramuscular Q4H PRN Max 3/day [facility] Holter, DO      OLANZapine  5 mg Oral Q3H PRN Max 3/day [facility] Holter, DO      Or    OLANZapine  5 mg Intramuscular Q3H PRN Max 3/day [facility] Holter, DO      OLANZapine  2.5 mg Oral Q4H PRN Max 6/day [facility] Holter, DO      ondansetron  4 mg Oral Q6H PRN HOLLI Lion      polyethylene glycol  17 g Oral Daily HOLLI Lion      polyethylene glycol  17 g Oral Daily PRN [facility] Holter, DO      propranolol  10 mg Oral Q12H KAYLIE HOLLI Lion      risperiDONE  2 mg Oral HS Bora Rosario MD      senna-docusate sodium  1 tablet Oral Daily PRN [facility] Holter, DO      senna-docusate sodium  2 tablet Oral BID HOLLI Lion      traZODone  50 mg Oral HS PRN HOLLI Lion      white petrolatum-mineral oil   Topical TID PRN HOLLI Lion         Risks, benefits and possible side effects of Medications:   Risks, benefits, and possible side effects of medications explained to patient and patient verbalizes understanding

## 2024-05-18 NOTE — PLAN OF CARE
Problem: Alteration in Thoughts and Perception  Goal: Treatment Goal: Gain control of psychotic behaviors/thinking, reduce/eliminate presenting symptoms and demonstrate improved reality functioning upon discharge  Outcome: Progressing  Goal: Verbalize thoughts and feelings  Description: Interventions:  - Promote a nonjudgmental and trusting relationship with the patient through active listening and therapeutic communication  - Assess patient's level of functioning, behavior and potential for risk  - Engage patient in 1 on 1 interactions  - Encourage patient to express fears, feelings, frustrations, and discuss symptoms    - [place] patient to reality, help patient recognize reality-based thinking   - Administer medications as ordered and assess for potential side effects  - Provide the patient education related to the signs and symptoms of the illness and desired effects of prescribed medications  Outcome: Progressing  Goal: Refrain from acting on delusional thinking/internal stimuli  Description: Interventions:  - Monitor patient closely, per order   - Utilize least restrictive measures   - Set reasonable limits, give positive feedback for acceptable   - Administer medications as ordered and monitor of potential side effects  Outcome: Progressing  Goal: Agree to be compliant with medication regime, as prescribed and report medication side effects  Description: Interventions:  - Offer appropriate PRN medication and supervise ingestion; conduct AIMS, as needed   Outcome: Progressing  Goal: Attend and participate in unit activities, including therapeutic, recreational, and educational groups  Description: Interventions:  -Encourage Visitation and family involvement in care  Outcome: Progressing  Goal: Recognize dysfunctional thoughts, communicate reality-based thoughts at the time of discharge  Description: Interventions:  - Provide medication and psycho-education to assist patient in compliance and developing  insight into his/her illness   Outcome: Progressing  Goal: Complete daily ADLs, including personal hygiene independently, as able  Description: Interventions:  - Observe, teach, and assist patient with ADLS  - Monitor and promote a balance of rest/activity, with adequate nutrition and elimination   Outcome: Progressing     Problem: Ineffective Coping  Goal: Demonstrates healthy coping skills  Outcome: Progressing  Goal: Participates in unit activities  Description: Interventions:  - Provide therapeutic environment   - Provide required programming   - Redirect inappropriate behaviors   Outcome: Progressing  Goal: Patient/Family participate in treatment and DC plans  Description: Interventions:  - Provide therapeutic environment  Outcome: Progressing     Problem: Depression  Goal: Treatment Goal: Demonstrate behavioral control of depressive symptoms, verbalize feelings of improved mood/affect, and adopt new coping skills prior to discharge  Outcome: Progressing  Goal: Verbalize thoughts and feelings  Description: Interventions:  - Assess and re-assess patient's level of risk   - Engage patient in 1:1 interactions, daily, for a minimum of 15 minutes   - Encourage patient to express feelings, fears, frustrations, hopes   Outcome: Progressing  Goal: Refrain from harming self  Description: Interventions:  - Monitor patient closely, per order   - Supervise medication ingestion, monitor effects and side effects   Outcome: Progressing  Goal: Refrain from isolation  Description: Interventions:  - Develop a trusting relationship   - Encourage socialization   Outcome: Progressing  Goal: Refrain from self-neglect  Outcome: Progressing  Goal: Attend and participate in unit activities, including therapeutic, recreational, and educational groups  Description: Interventions:  - Provide therapeutic and educational activities daily, encourage attendance and participation, and document same in the medical record   Outcome:  Progressing  Goal: Complete daily ADLs, including personal hygiene independently, as able  Description: Interventions:  - Observe, teach, and assist patient with ADLS  -  Monitor and promote a balance of rest/activity, with adequate nutrition and elimination   Outcome: Progressing     Problem: Anxiety  Goal: Anxiety is at manageable level  Description: Interventions:  - Assess and monitor patient's anxiety level.   - Monitor for signs and symptoms (heart palpitations, chest pain, shortness of breath, headaches, nausea, feeling jumpy, restlessness, irritable, apprehensive).   - Collaborate with interdisciplinary team and initiate plan and interventions as ordered.  - [place] patient to unit/surroundings  - Explain treatment plan  - Encourage participation in care  - Encourage verbalization of concerns/fears  - Identify coping mechanisms  - Assist in developing anxiety-reducing skills  - Administer/offer alternative therapies  - Limit or eliminate stimulants  Outcome: Progressing     Problem: Alteration in Orientation  Goal: Treatment Goal: Demonstrate a reduction of confusion and improved orientation to person, place, time and/or situation upon discharge, according to optimum baseline/ability  Outcome: Progressing  Goal: Interact with staff daily  Description: Interventions:  - Assess and re-assess patient's level of orientation  - Engage patient in 1 on 1 interactions, daily, for a minimum of 15 minutes   - Establish rapport/trust with patient   Outcome: Progressing  Goal: Attend and participate in unit activities, including therapeutic, recreational, and educational groups  Description: Interventions:  - Provide therapeutic and educational activities daily, encourage attendance and participation, and document same in the medical record   - Provide appropriate opportunities for reminiscence   - Provide a consistent daily routine   - Encourage family contact/visitation   Outcome: Progressing     Problem:  Electroconvulsive therapy (ECT)  Goal: Treatment Goal: Demonstrate a reduction of confusion and improved orientation to person, place, time and/or situation upon discharge, according to optimum baseline/ability  Outcome: Progressing  Goal: Verbalizes/displays adequate comfort level or baseline comfort level  Description: Interventions:  - Encourage patient to monitor pain and request assistance  - Assess pain using appropriate pain scale  - Administer analgesics based on type and severity of pain and evaluate response  - Implement non-pharmacological measures as appropriate and evaluate response  - Consider cultural and social influences on pain and pain management  - Notify physician/advanced practitioner if interventions unsuccessful or patient reports new pain  Outcome: Progressing  Goal: Achieves stable or improved neurological status  Description: INTERVENTIONS  - Monitor and report changes in neurological status  - Monitor vital signs such as temperature, blood pressure, glucose, and any other labs ordered   - Initiate measures to prevent increased intracranial pressure  - Monitor for seizure activity and implement precautions if appropriate      Outcome: Progressing     Problem: DISCHARGE PLANNING - CARE MANAGEMENT  Goal: Discharge to post-acute care or home with appropriate resources  Description: INTERVENTIONS:  - Conduct assessment to determine patient/family and health care team treatment goals, and need for post-acute services based on payer coverage, community resources, and patient preferences, and barriers to discharge  - Address psychosocial, clinical, and financial barriers to discharge as identified in assessment in conjunction with the patient/family and health care team  - Arrange appropriate level of post-acute services according to patient’s   needs and preference and payer coverage in collaboration with the physician and health care team  - Communicate with and update the patient/family,  physician, and health care team regarding progress on the discharge plan  - Arrange appropriate transportation to post-acute venues  Outcome: Progressing

## 2024-05-18 NOTE — NURSING NOTE
Alberto has been in his room sleeping most of the day after his ECT.  Still reporting hearing voices.  Med and meal compliant.  Consumed 75% of dinner. More visible in the evening.  Requested nicotine gum.  No behavioral issues.

## 2024-05-18 NOTE — NURSING NOTE
BP elevated 151/96. Manual recheck 146/98. Next dose of routine antihypertensive scheduled for 2100. AH present stating he is trying to ignore them and use coping skills. Educated to inform staff if AH becomes unmanageable. Currently sitting in dining room talking w/ male peer.

## 2024-05-19 VITALS
SYSTOLIC BLOOD PRESSURE: 145 MMHG | RESPIRATION RATE: 18 BRPM | TEMPERATURE: 97.8 F | OXYGEN SATURATION: 99 % | DIASTOLIC BLOOD PRESSURE: 81 MMHG | WEIGHT: 259.4 LBS | HEIGHT: 66 IN | BODY MASS INDEX: 41.69 KG/M2 | HEART RATE: 97 BPM

## 2024-05-19 PROCEDURE — 99232 SBSQ HOSP IP/OBS MODERATE 35: CPT | Performed by: PSYCHIATRY & NEUROLOGY

## 2024-05-19 RX ADMIN — METFORMIN HYDROCHLORIDE 500 MG: 500 TABLET ORAL at 08:30

## 2024-05-19 RX ADMIN — CLOZAPINE 500 MG: 100 TABLET ORAL at 21:16

## 2024-05-19 RX ADMIN — AMLODIPINE BESYLATE 5 MG: 5 TABLET ORAL at 08:30

## 2024-05-19 RX ADMIN — SENNOSIDES AND DOCUSATE SODIUM 2 TABLET: 8.6; 5 TABLET ORAL at 17:13

## 2024-05-19 RX ADMIN — MELATONIN TAB 3 MG 3 MG: 3 TAB at 21:16

## 2024-05-19 RX ADMIN — RISPERIDONE 2 MG: 2 TABLET, FILM COATED ORAL at 21:16

## 2024-05-19 RX ADMIN — POLYETHYLENE GLYCOL 3350 17 G: 17 POWDER, FOR SOLUTION ORAL at 08:31

## 2024-05-19 RX ADMIN — ESCITALOPRAM OXALATE 20 MG: 10 TABLET, FILM COATED ORAL at 08:30

## 2024-05-19 RX ADMIN — SENNOSIDES AND DOCUSATE SODIUM 2 TABLET: 8.6; 5 TABLET ORAL at 08:30

## 2024-05-19 RX ADMIN — NICOTINE POLACRILEX 2 MG: 2 GUM, CHEWING ORAL at 09:15

## 2024-05-19 RX ADMIN — PROPRANOLOL HYDROCHLORIDE 10 MG: 10 TABLET ORAL at 08:30

## 2024-05-19 RX ADMIN — CYANOCOBALAMIN TAB 1000 MCG 1000 MCG: 1000 TAB at 08:30

## 2024-05-19 RX ADMIN — PROPRANOLOL HYDROCHLORIDE 10 MG: 10 TABLET ORAL at 21:16

## 2024-05-19 RX ADMIN — NICOTINE POLACRILEX 2 MG: 2 GUM, CHEWING ORAL at 18:06

## 2024-05-19 NOTE — PLAN OF CARE
Problem: Alteration in Thoughts and Perception  Goal: Verbalize thoughts and feelings  Description: Interventions:  - Promote a nonjudgmental and trusting relationship with the patient through active listening and therapeutic communication  - Assess patient's level of functioning, behavior and potential for risk  - Engage patient in 1 on 1 interactions  - Encourage patient to express fears, feelings, frustrations, and discuss symptoms    - [place] patient to reality, help patient recognize reality-based thinking   - Administer medications as ordered and assess for potential side effects  - Provide the patient education related to the signs and symptoms of the illness and desired effects of prescribed medications  Outcome: Progressing  Goal: Agree to be compliant with medication regime, as prescribed and report medication side effects  Description: Interventions:  - Offer appropriate PRN medication and supervise ingestion; conduct AIMS, as needed   Outcome: Progressing  Goal: Attend and participate in unit activities, including therapeutic, recreational, and educational groups  Description: Interventions:  -Encourage Visitation and family involvement in care  Outcome: Progressing  Goal: Recognize dysfunctional thoughts, communicate reality-based thoughts at the time of discharge  Description: Interventions:  - Provide medication and psycho-education to assist patient in compliance and developing insight into his/her illness   Outcome: Progressing  Goal: Complete daily ADLs, including personal hygiene independently, as able  Description: Interventions:  - Observe, teach, and assist patient with ADLS  - Monitor and promote a balance of rest/activity, with adequate nutrition and elimination   Outcome: Progressing     Problem: Ineffective Coping  Goal: Participates in unit activities  Description: Interventions:  - Provide therapeutic environment   - Provide required programming   - Redirect inappropriate behaviors    Outcome: Progressing  Goal: Patient/Family verbalizes awareness of resources  Outcome: Progressing     Problem: Depression  Goal: Refrain from harming self  Description: Interventions:  - Monitor patient closely, per order   - Supervise medication ingestion, monitor effects and side effects   Outcome: Progressing  Goal: Refrain from isolation  Description: Interventions:  - Develop a trusting relationship   - Encourage socialization   Outcome: Progressing  Goal: Refrain from self-neglect  Outcome: Progressing  Goal: Complete daily ADLs, including personal hygiene independently, as able  Description: Interventions:  - Observe, teach, and assist patient with ADLS  -  Monitor and promote a balance of rest/activity, with adequate nutrition and elimination   Outcome: Progressing     Problem: Alteration in Orientation  Goal: Interact with staff daily  Description: Interventions:  - Assess and re-assess patient's level of orientation  - Engage patient in 1 on 1 interactions, daily, for a minimum of 15 minutes   - Establish rapport/trust with patient   Outcome: Progressing     Problem: Alteration in Thoughts and Perception  Goal: Refrain from acting on delusional thinking/internal stimuli  Description: Interventions:  - Monitor patient closely, per order   - Utilize least restrictive measures   - Set reasonable limits, give positive feedback for acceptable   - Administer medications as ordered and monitor of potential side effects  Outcome: Not Progressing

## 2024-05-19 NOTE — NURSING NOTE
/94. Medical notified and instructed to monitor. Routine inderal due at 2100. Remains calm, visible, and social. No behavioral issues.

## 2024-05-19 NOTE — NURSING NOTE
Refused weekly weight. Lungs CTA. Bowel sounds present in all quadrants. +1 edema noted to R ankle. Denies pain and injury. Requesting podiatry consult for long toenails. No other concerns verbalized.

## 2024-05-19 NOTE — NURSING NOTE
"Patient states he sometimes gets a \"cramp\" in his chest that lasts 10 mins then goes away. Denies pain currently. Vitals being monitored and labs ordered for tomorrow. Patient educated to inform staff immediately when pain is occurring.  "

## 2024-05-19 NOTE — NURSING NOTE
Alberto maintained on ongoing SAFE precautions without incident on this shift. Awake,alert, cooperative and pleasant upon approach. Attended and participated in 6 out 9 groups today.. Continues to be compliant with meds and snack. Denies depression or anxiety.  No overt delusion or A/V/T Hallucination noted.  No Behavior noted

## 2024-05-19 NOTE — NURSING NOTE
Patient is calm, pleasant, and cooperative. Visible and social w/ peers. Med and meal compliant. Attending some groups. Reports AH are present but not bothersome. Continuous rounding maintained.

## 2024-05-20 LAB
ATRIAL RATE: 93 BPM
ATRIAL RATE: 94 BPM
BASOPHILS # BLD AUTO: 0.05 THOUSANDS/ÂΜL (ref 0–0.1)
BASOPHILS NFR BLD AUTO: 1 % (ref 0–1)
BNP SERPL-MCNC: 13 PG/ML (ref 0–100)
CK SERPL-CCNC: 361 U/L (ref 39–308)
CLOZAPINE SERPL-MCNC: 769 NG/ML (ref 350–900)
CRP SERPL QL: 9.1 MG/L
EOSINOPHIL # BLD AUTO: 0.32 THOUSAND/ÂΜL (ref 0–0.61)
EOSINOPHIL NFR BLD AUTO: 4 % (ref 0–6)
ERYTHROCYTE [DISTWIDTH] IN BLOOD BY AUTOMATED COUNT: 15.1 % (ref 11.6–15.1)
HCT VFR BLD AUTO: 40.7 % (ref 36.5–49.3)
HGB BLD-MCNC: 12.4 G/DL (ref 12–17)
IMM GRANULOCYTES # BLD AUTO: 0.05 THOUSAND/UL (ref 0–0.2)
IMM GRANULOCYTES NFR BLD AUTO: 1 % (ref 0–2)
LYMPHOCYTES # BLD AUTO: 1.99 THOUSANDS/ÂΜL (ref 0.6–4.47)
LYMPHOCYTES NFR BLD AUTO: 22 % (ref 14–44)
MCH RBC QN AUTO: 24.2 PG (ref 26.8–34.3)
MCHC RBC AUTO-ENTMCNC: 30.5 G/DL (ref 31.4–37.4)
MCV RBC AUTO: 80 FL (ref 82–98)
MONOCYTES # BLD AUTO: 0.8 THOUSAND/ÂΜL (ref 0.17–1.22)
MONOCYTES NFR BLD AUTO: 9 % (ref 4–12)
NEUTROPHILS # BLD AUTO: 5.67 THOUSANDS/ÂΜL (ref 1.85–7.62)
NEUTS SEG NFR BLD AUTO: 63 % (ref 43–75)
NRBC BLD AUTO-RTO: 0 /100 WBCS
P AXIS: 46 DEGREES
P AXIS: 50 DEGREES
PLATELET # BLD AUTO: 250 THOUSANDS/UL (ref 149–390)
PMV BLD AUTO: 10.5 FL (ref 8.9–12.7)
PR INTERVAL: 150 MS
PR INTERVAL: 156 MS
QRS AXIS: 44 DEGREES
QRS AXIS: 45 DEGREES
QRSD INTERVAL: 88 MS
QRSD INTERVAL: 92 MS
QT INTERVAL: 350 MS
QT INTERVAL: 354 MS
QTC INTERVAL: 437 MS
QTC INTERVAL: 440 MS
RBC # BLD AUTO: 5.12 MILLION/UL (ref 3.88–5.62)
T WAVE AXIS: -14 DEGREES
T WAVE AXIS: -16 DEGREES
VENTRICULAR RATE: 93 BPM
VENTRICULAR RATE: 94 BPM
WBC # BLD AUTO: 8.88 THOUSAND/UL (ref 4.31–10.16)

## 2024-05-20 PROCEDURE — 99232 SBSQ HOSP IP/OBS MODERATE 35: CPT | Performed by: PSYCHIATRY & NEUROLOGY

## 2024-05-20 PROCEDURE — 80159 DRUG ASSAY CLOZAPINE: CPT | Performed by: PHYSICIAN ASSISTANT

## 2024-05-20 PROCEDURE — 86140 C-REACTIVE PROTEIN: CPT | Performed by: PHYSICIAN ASSISTANT

## 2024-05-20 PROCEDURE — 93005 ELECTROCARDIOGRAM TRACING: CPT

## 2024-05-20 PROCEDURE — 93010 ELECTROCARDIOGRAM REPORT: CPT | Performed by: STUDENT IN AN ORGANIZED HEALTH CARE EDUCATION/TRAINING PROGRAM

## 2024-05-20 PROCEDURE — 83880 ASSAY OF NATRIURETIC PEPTIDE: CPT | Performed by: PHYSICIAN ASSISTANT

## 2024-05-20 PROCEDURE — 85025 COMPLETE CBC W/AUTO DIFF WBC: CPT | Performed by: PHYSICIAN ASSISTANT

## 2024-05-20 PROCEDURE — 82550 ASSAY OF CK (CPK): CPT | Performed by: PHYSICIAN ASSISTANT

## 2024-05-20 RX ADMIN — CYANOCOBALAMIN TAB 1000 MCG 1000 MCG: 1000 TAB at 08:35

## 2024-05-20 RX ADMIN — METFORMIN HYDROCHLORIDE 500 MG: 500 TABLET ORAL at 08:34

## 2024-05-20 RX ADMIN — RISPERIDONE 2 MG: 2 TABLET, FILM COATED ORAL at 21:14

## 2024-05-20 RX ADMIN — SENNOSIDES AND DOCUSATE SODIUM 2 TABLET: 8.6; 5 TABLET ORAL at 08:34

## 2024-05-20 RX ADMIN — ESCITALOPRAM OXALATE 20 MG: 10 TABLET, FILM COATED ORAL at 08:35

## 2024-05-20 RX ADMIN — PROPRANOLOL HYDROCHLORIDE 10 MG: 10 TABLET ORAL at 08:35

## 2024-05-20 RX ADMIN — AMLODIPINE BESYLATE 5 MG: 5 TABLET ORAL at 08:35

## 2024-05-20 RX ADMIN — PROPRANOLOL HYDROCHLORIDE 10 MG: 10 TABLET ORAL at 21:13

## 2024-05-20 RX ADMIN — NICOTINE POLACRILEX 2 MG: 2 GUM, CHEWING ORAL at 18:04

## 2024-05-20 RX ADMIN — NICOTINE POLACRILEX 2 MG: 2 GUM, CHEWING ORAL at 11:05

## 2024-05-20 RX ADMIN — MELATONIN TAB 3 MG 3 MG: 3 TAB at 21:14

## 2024-05-20 RX ADMIN — CLOZAPINE 500 MG: 100 TABLET ORAL at 21:13

## 2024-05-20 RX ADMIN — SENNOSIDES AND DOCUSATE SODIUM 2 TABLET: 8.6; 5 TABLET ORAL at 17:15

## 2024-05-20 NOTE — SOCIAL WORK
SW met with pt  Pt requests an in person visit from his aunt and 2 sisters. Visit is approved as pt will likely be discharging home to his aunt with the support of his sisters. Visit will be scheduled once pt coordinates availability with family.   Pt denied any other complaints or concerns at this time.

## 2024-05-20 NOTE — NURSING NOTE
Patient is calm, pleasant, and cooperative. Visible and social. Ate 100% of breakfast and lunch. Denies nausea. Med compliant. Attending groups. Denies bothersome AH. Continuous rounding maintained.

## 2024-05-20 NOTE — NURSING NOTE
Alberto maintain on ongoing SAFE precaution without incident on this shift. Observed resting in bed with eyes closed, respiration even and unlabored. Continuous Q 7 minutes check implemented thru the night. Schedule labs to be obtain later today: BNP, C-reactive protein; CBC: CK and Clozapine. No behavioral noted         stated

## 2024-05-20 NOTE — NURSING NOTE
Alberto maintained on ongoing SAFE precautions without incident on this shift. Awake,alert, cooperative and pleasant upon approach. Attended and participated in 4 out 7 groups today.. Continues to be compliant with meds and snack. Denies depression or anxiety.  No overt delusion or A/V/T Hallucination noted.  He did not hang out in the lounge tonight, he went straight to his room for the rest of the night, c/o fatigue. No Behavior noted

## 2024-05-20 NOTE — PROGRESS NOTES
"   05/20/24 0726   Team Meeting   Meeting Type Daily Rounds   Team Members Present   Team Members Present Physician;Nurse;;Other (Discipline and Name)   Patient/Family Present   Patient Present No   Patient's Family Present No     In attendance:  Dr. Jordan Holter, DO Verónica Jo, HOLLI Duvall, RN  Luisana Alvarado, [facility]  Carla Lux, W  MATILDA Daniel.S.    Groups: 4/9    Pt denies symptoms other than voices that are always present. Pt had some high blood pressure and \"chest cramps\" noted. Pt will have EKG and labs completed. Next ECT 5/24.     "

## 2024-05-21 PROCEDURE — 99232 SBSQ HOSP IP/OBS MODERATE 35: CPT | Performed by: PSYCHIATRY & NEUROLOGY

## 2024-05-21 RX ADMIN — AMLODIPINE BESYLATE 5 MG: 5 TABLET ORAL at 08:55

## 2024-05-21 RX ADMIN — ESCITALOPRAM OXALATE 20 MG: 10 TABLET, FILM COATED ORAL at 08:55

## 2024-05-21 RX ADMIN — METFORMIN HYDROCHLORIDE 500 MG: 500 TABLET ORAL at 08:55

## 2024-05-21 RX ADMIN — PROPRANOLOL HYDROCHLORIDE 10 MG: 10 TABLET ORAL at 21:18

## 2024-05-21 RX ADMIN — NICOTINE POLACRILEX 2 MG: 2 GUM, CHEWING ORAL at 13:13

## 2024-05-21 RX ADMIN — ERGOCALCIFEROL 50000 UNITS: 1.25 CAPSULE, LIQUID FILLED ORAL at 08:55

## 2024-05-21 RX ADMIN — RISPERIDONE 2 MG: 2 TABLET, FILM COATED ORAL at 21:18

## 2024-05-21 RX ADMIN — CYANOCOBALAMIN TAB 1000 MCG 1000 MCG: 1000 TAB at 08:55

## 2024-05-21 RX ADMIN — SENNOSIDES AND DOCUSATE SODIUM 2 TABLET: 8.6; 5 TABLET ORAL at 08:55

## 2024-05-21 RX ADMIN — PROPRANOLOL HYDROCHLORIDE 10 MG: 10 TABLET ORAL at 08:55

## 2024-05-21 RX ADMIN — NICOTINE POLACRILEX 2 MG: 2 GUM, CHEWING ORAL at 17:37

## 2024-05-21 RX ADMIN — NICOTINE POLACRILEX 2 MG: 2 GUM, CHEWING ORAL at 08:56

## 2024-05-21 RX ADMIN — CLOZAPINE 500 MG: 100 TABLET ORAL at 21:18

## 2024-05-21 RX ADMIN — MELATONIN TAB 3 MG 3 MG: 3 TAB at 21:18

## 2024-05-21 RX ADMIN — POLYETHYLENE GLYCOL 3350 17 G: 17 POWDER, FOR SOLUTION ORAL at 08:54

## 2024-05-21 RX ADMIN — SENNOSIDES AND DOCUSATE SODIUM 2 TABLET: 8.6; 5 TABLET ORAL at 17:16

## 2024-05-21 NOTE — PLAN OF CARE
Problem: Alteration in Thoughts and Perception  Goal: Treatment Goal: Gain control of psychotic behaviors/thinking, reduce/eliminate presenting symptoms and demonstrate improved reality functioning upon discharge  Outcome: Progressing  Goal: Verbalize thoughts and feelings  Description: Interventions:  - Promote a nonjudgmental and trusting relationship with the patient through active listening and therapeutic communication  - Assess patient's level of functioning, behavior and potential for risk  - Engage patient in 1 on 1 interactions  - Encourage patient to express fears, feelings, frustrations, and discuss symptoms    - [place] patient to reality, help patient recognize reality-based thinking   - Administer medications as ordered and assess for potential side effects  - Provide the patient education related to the signs and symptoms of the illness and desired effects of prescribed medications  Outcome: Progressing  Goal: Refrain from acting on delusional thinking/internal stimuli  Description: Interventions:  - Monitor patient closely, per order   - Utilize least restrictive measures   - Set reasonable limits, give positive feedback for acceptable   - Administer medications as ordered and monitor of potential side effects  Outcome: Progressing  Goal: Agree to be compliant with medication regime, as prescribed and report medication side effects  Description: Interventions:  - Offer appropriate PRN medication and supervise ingestion; conduct AIMS, as needed   Outcome: Progressing  Goal: Attend and participate in unit activities, including therapeutic, recreational, and educational groups  Description: Interventions:  -Encourage Visitation and family involvement in care  Outcome: Progressing  Goal: Complete daily ADLs, including personal hygiene independently, as able  Description: Interventions:  - Observe, teach, and assist patient with ADLS  - Monitor and promote a balance of rest/activity, with adequate  nutrition and elimination   Outcome: Progressing     Problem: Ineffective Coping  Goal: Demonstrates healthy coping skills  Outcome: Progressing  Goal: Participates in unit activities  Description: Interventions:  - Provide therapeutic environment   - Provide required programming   - Redirect inappropriate behaviors   Outcome: Progressing     Problem: Depression  Goal: Treatment Goal: Demonstrate behavioral control of depressive symptoms, verbalize feelings of improved mood/affect, and adopt new coping skills prior to discharge  Outcome: Progressing  Goal: Verbalize thoughts and feelings  Description: Interventions:  - Assess and re-assess patient's level of risk   - Engage patient in 1:1 interactions, daily, for a minimum of 15 minutes   - Encourage patient to express feelings, fears, frustrations, hopes   Outcome: Progressing  Goal: Refrain from harming self  Description: Interventions:  - Monitor patient closely, per order   - Supervise medication ingestion, monitor effects and side effects   Outcome: Progressing  Goal: Refrain from isolation  Description: Interventions:  - Develop a trusting relationship   - Encourage socialization   Outcome: Progressing  Goal: Refrain from self-neglect  Outcome: Progressing  Goal: Attend and participate in unit activities, including therapeutic, recreational, and educational groups  Description: Interventions:  - Provide therapeutic and educational activities daily, encourage attendance and participation, and document same in the medical record   Outcome: Progressing  Goal: Complete daily ADLs, including personal hygiene independently, as able  Description: Interventions:  - Observe, teach, and assist patient with ADLS  -  Monitor and promote a balance of rest/activity, with adequate nutrition and elimination   Outcome: Progressing     Problem: Anxiety  Goal: Anxiety is at manageable level  Description: Interventions:  - Assess and monitor patient's anxiety level.   - Monitor  for signs and symptoms (heart palpitations, chest pain, shortness of breath, headaches, nausea, feeling jumpy, restlessness, irritable, apprehensive).   - Collaborate with interdisciplinary team and initiate plan and interventions as ordered.  - [place] patient to unit/surroundings  - Explain treatment plan  - Encourage participation in care  - Encourage verbalization of concerns/fears  - Identify coping mechanisms  - Assist in developing anxiety-reducing skills  - Administer/offer alternative therapies  - Limit or eliminate stimulants  Outcome: Progressing     Problem: Electroconvulsive therapy (ECT)  Goal: Treatment Goal: Demonstrate a reduction of confusion and improved orientation to person, place, time and/or situation upon discharge, according to optimum baseline/ability  Outcome: Progressing  Goal: Achieves maximal functionality and self care  Description: INTERVENTIONS  - Monitor swallowing and airway patency with patient fatigue and changes in neurological status  - Encourage and assist patient to increase activity and self care.   - Encourage visually impaired, hearing impaired and aphasic patients to use assistive/communication devices  Outcome: Progressing  Goal: Minimal or absence of nausea and/or vomiting  Description: INTERVENTIONS:  - Administer IV fluids if ordered to ensure adequate hydration  - Maintain NPO status until nausea and vomiting are resolved  - Nasogastric tube if ordered  - Administer ordered antiemetic medications as needed  - Provide nonpharmacologic comfort measures as appropriate  - Advance diet as tolerated, if ordered  - Consider nutrition services referral to assist patient with adequate nutrition and appropriate food choices  Outcome: Progressing  Goal: Maintains adequate nutritional intake  Description: INTERVENTIONS:  - Monitor percentage of each meal consumed  - Identify factors contributing to decreased intake, treat as appropriate  - Assist with meals as needed  - Monitor  I&O, weight, and lab values if indicated  - Obtain nutrition services referral as needed  Outcome: Progressing

## 2024-05-21 NOTE — PROGRESS NOTES
Psychiatric Progress Note - Department of Behavioral Health   Alberto Berumen 28 y.o. male MRN: [id number]  Unit/Bed#: [facility] 101-02 Encounter: [id number]    ASSESSMENT & PLAN     Diagnoses:   Principal Problem:    Schizoaffective disorder, bipolar type (HCC)  Active Problems:    GERD (gastroesophageal reflux disease)    Medical clearance for psychiatric admission    Tobacco abuse    T wave inversion in EKG    Chronic idiopathic constipation    Confluent and reticulate papillomatosis    Primary hypertension    Elevated hemoglobin A1c    Treatment Recommendations/Precautions:  Continue to promote patient participation in therapeutic milieu.  Continue medical management per medicine.  , CRP 9.1, EKG nonspecific ST/T wave abnormalities with QTc 437 ms.  Recommendations appreciated if applicable.  Continue previously prescribed psychotropic medication regimen; see below.  Continue behavioral health checks q.7 minutes.   Continue vitals per behavioral health unit protocol.  Discharge date per primary team; 201 commitment status.    SUBJECTIVE     Patient evaluated this a.m. for continuity of care. Patient was discussed at length with nursing and treatment team. Per nursing, patient remains calm and cooperative, intermittently interactive in the milieu, adherent to his medications less any acute adverse effects. No acute distress is noted throughout evaluation. Alberto Berumen denies suicidal/homicidal ideation in addition to thoughts of self-injury, receptive to crisis planning provided by this writer, contacting for safety in the inpatient setting, admitting to an ability to appropriately confide in staff including this writer.  Patient remains somewhat scant and sparse in interaction involving this writer, denying any/all psychiatric complaints/concerns, superficial on approach, suspicious and paranoid    PSYCHIATRIC REVIEW OF SYSTEMS     Behavior over the last 24 hours:  unchanged  Sleep: adequate  Appetite:  adequate  Medication side effects: No    REVIEW OF SYSTEMS   Review of systems: no complaints    OBJECTIVE     Vital Signs in Past 24 Hours:  Temp:  [98 °F (36.7 °C)-98.3 °F (36.8 °C)] 98.3 °F (36.8 °C)  HR:  [] 97  Resp:  [18] 18  BP: (136-159)/(85-98) 136/85    Intake/Output in Past 24 hours:  No intake/output data recorded.  No intake/output data recorded.        Laboratory Results:  I have personally reviewed all pertinent laboratory/tests results.  Most Recent Labs:   Lab Results   Component Value Date    WBC 8.88 05/20/2024    RBC 5.12 05/20/2024    HGB 12.4 05/20/2024    HCT 40.7 05/20/2024     05/20/2024    RDW 15.1 05/20/2024    NEUTROABS 5.67 05/20/2024    SODIUM 139 04/01/2024    K 4.1 04/01/2024     04/01/2024    CO2 27 04/01/2024    BUN 12 04/01/2024    CREATININE 0.90 04/01/2024    GLUC 120 04/01/2024    GLUF 97 01/11/2024    CALCIUM 9.3 04/01/2024    AST 23 04/01/2024    ALT 53 (H) 04/01/2024    ALKPHOS 84 04/01/2024    TP 7.2 04/01/2024    ALB 4.0 04/01/2024    TBILI 0.23 04/01/2024    CHOLESTEROL 156 03/14/2024    HDL 44 03/14/2024    TRIG 133 03/14/2024    LDLCALC 85 03/14/2024    NONHDLC 112 03/14/2024    LITHIUM 0.61 01/09/2024    EAW6BBLOHZQC 1.062 11/15/2023    HGBA1C 5.0 04/01/2024    EAG 97 04/01/2024     Labs in last 72 hours:   Recent Labs     05/20/24  1122   WBC 8.88   RBC 5.12   HGB 12.4   HCT 40.7      RDW 15.1   NEUTROABS 5.67     CBC:   Lab Results   Component Value Date    WBC 8.88 05/20/2024    RBC 5.12 05/20/2024    HGB 12.4 05/20/2024    HCT 40.7 05/20/2024    MCV 80 (L) 05/20/2024     05/20/2024    MCH 24.2 (L) 05/20/2024    MCHC 30.5 (L) 05/20/2024    RDW 15.1 05/20/2024    MPV 10.5 05/20/2024    NRBC 0 05/20/2024    NEUTROABS 5.67 05/20/2024       Behavioral Health Medications: all current active meds have been reviewed, continue current psychiatric medications, and current meds:   Current Facility-Administered Medications   Medication Dose  Route Frequency    acetaminophen (TYLENOL) tablet 650 mg  650 mg Oral Q4H PRN    acetaminophen (TYLENOL) tablet 650 mg  650 mg Oral Q6H PRN    aluminum-magnesium hydroxide-simethicone (MAALOX) oral suspension 30 mL  30 mL Oral Q4H PRN    amLODIPine (NORVASC) tablet 5 mg  5 mg Oral Daily    Artificial Tears ophthalmic solution 1 drop  1 drop Both Eyes Q3H PRN    atropine (ISOPTO ATROPINE) 1 % ophthalmic solution 1 drop  1 drop Sublingual HS    atropine (ISOPTO ATROPINE) 1 % ophthalmic solution 1 drop  1 drop Sublingual Daily PRN    haloperidol lactate (HALDOL) injection 2.5 mg  2.5 mg Intramuscular Q4H PRN Max 4/day    And    LORazepam (ATIVAN) injection 1 mg  1 mg Intramuscular Q4H PRN Max 4/day    And    benztropine (COGENTIN) injection 0.5 mg  0.5 mg Intramuscular Q4H PRN Max 4/day    benztropine (COGENTIN) injection 1 mg  1 mg Intramuscular Q4H PRN Max 6/day    haloperidol lactate (HALDOL) injection 5 mg  5 mg Intramuscular Q4H PRN Max 4/day    And    LORazepam (ATIVAN) injection 2 mg  2 mg Intramuscular Q4H PRN Max 4/day    And    benztropine (COGENTIN) injection 1 mg  1 mg Intramuscular Q4H PRN Max 4/day    benztropine (COGENTIN) tablet 1 mg  1 mg Oral Q4H PRN Max 6/day    benztropine (COGENTIN) tablet 1 mg  1 mg Oral Q4H PRN Max 6/day    bisacodyl (DULCOLAX) rectal suppository 10 mg  10 mg Rectal Daily PRN    cloZAPine (CLOZARIL) tablet 500 mg  500 mg Oral HS    cyanocobalamin (VITAMIN B-12) tablet 1,000 mcg  1,000 mcg Oral Daily    hydrOXYzine HCL (ATARAX) tablet 50 mg  50 mg Oral Q6H PRN Max 4/day    Or    diphenhydrAMINE (BENADRYL) injection 50 mg  50 mg Intramuscular Q6H PRN    hydrOXYzine HCL (ATARAX) tablet 50 mg  50 mg Oral Q6H PRN Max 4/day    Or    diphenhydrAMINE (BENADRYL) injection 50 mg  50 mg Intramuscular Q6H PRN    diphenhydrAMINE-zinc acetate (BENADRYL) 2-0.1 % cream   Topical BID PRN    escitalopram (LEXAPRO) tablet 20 mg  20 mg Oral Daily    haloperidol (HALDOL) tablet 1 mg  1 mg Oral Q6H  PRN    haloperidol (HALDOL) tablet 2.5 mg  2.5 mg Oral Q4H PRN Max 4/day    haloperidol (HALDOL) tablet 5 mg  5 mg Oral Q4H PRN Max 4/day    hydrocortisone 1 % ointment   Topical 4x Daily PRN    hydrOXYzine HCL (ATARAX) tablet 100 mg  100 mg Oral Q6H PRN Max 4/day    Or    LORazepam (ATIVAN) injection 2 mg  2 mg Intramuscular Q6H PRN    hydrOXYzine HCL (ATARAX) tablet 100 mg  100 mg Oral Q6H PRN Max 4/day    Or    LORazepam (ATIVAN) injection 2 mg  2 mg Intramuscular Q6H PRN    hydrOXYzine HCL (ATARAX) tablet 25 mg  25 mg Oral Q6H PRN Max 4/day    ibuprofen (MOTRIN) tablet 600 mg  600 mg Oral Q8H PRN    melatonin tablet 3 mg  3 mg Oral HS    metFORMIN (GLUCOPHAGE) tablet 500 mg  500 mg Oral Daily With Breakfast    methocarbamol (ROBAXIN) tablet 500 mg  500 mg Oral Q6H PRN    nicotine polacrilex (NICORETTE) gum 2 mg  2 mg Oral Q4H PRN    OLANZapine (ZyPREXA) tablet 5 mg  5 mg Oral Q4H PRN Max 3/day    Or    OLANZapine (ZyPREXA) IM injection 2.5 mg  2.5 mg Intramuscular Q4H PRN Max 3/day    OLANZapine (ZyPREXA) tablet 5 mg  5 mg Oral Q3H PRN Max 3/day    Or    OLANZapine (ZyPREXA) IM injection 5 mg  5 mg Intramuscular Q3H PRN Max 3/day    OLANZapine (ZyPREXA) tablet 2.5 mg  2.5 mg Oral Q4H PRN Max 6/day    ondansetron (ZOFRAN-ODT) dispersible tablet 4 mg  4 mg Oral Q6H PRN    polyethylene glycol (MIRALAX) packet 17 g  17 g Oral Daily    polyethylene glycol (MIRALAX) packet 17 g  17 g Oral Daily PRN    propranolol (INDERAL) tablet 10 mg  10 mg Oral Q12H KAYLIE    risperiDONE (RisperDAL) tablet 2 mg  2 mg Oral HS    senna-docusate sodium (SENOKOT S) 8.6-50 mg per tablet 1 tablet  1 tablet Oral Daily PRN    senna-docusate sodium (SENOKOT S) 8.6-50 mg per tablet 2 tablet  2 tablet Oral BID    traZODone (DESYREL) tablet 50 mg  50 mg Oral HS PRN    white petrolatum-mineral oil (EUCERIN,HYDROCERIN) cream   Topical TID PRN   .    Risks, benefits and possible side effects of Medications:   Risks, benefits, and possible side  effects of medications explained to patient and patient verbalizes understanding.      Mental Status Evaluation:  Appearance:  casually dressed, disheveled, and older than stated age, marginal grooming/hygiene   Behavior:  psychomotor retardation, superficial and suspicious possessing intermittent eye contact   Speech:  normal pitch and normal volume, monotone at times, scant   Mood:  euthymic   Affect:  blunted and mood-incongruent   Language sparse   Thought Process:  concrete   Thought Content:  Paranoid ideation   Perceptual Disturbances: None although appears internally preoccupied and distracted   Risk Potential: Suicidal Ideations none, Homicidal Ideations none, and Potential for Aggression No   Sensorium:  person, place, and time/date   Cognition:  recent and remote memory grossly intact   Consciousness:  alert and awake    Attention: attention span appeared shorter than expected for age   Insight:  limited   Judgment: limited   Intellect Not assessed   Gait/Station: Not assessed; sitting upright in chair   Motor Activity: no abnormal movements     Memory: Short and long term memory  fair     Progress Toward Goals: unchanged, as evidenced by their participation (or lack thereof) in individual, social and therapeutic milieu in addition to adherence to their medication regimen.    Recommended Treatment:   See above for assessment and plan.    Inpatient Psychiatric Certification: Based upon physical, mental and social evaluations, I certify that inpatient psychiatric services are medically necessary for this patient for a duration of greater than 2 midnights for the treatment of Schizoaffective disorder, bipolar type (HCC) including psychotropic medication management, participation in the therapeutic milieu and referrals as indicated. Available alternative community resources do not meet the patient's mental health care needs. I further attest that an established written individualized plan of care has been  implemented and is outlined in the patient's medical records.    Counseling/Coordination of Care    Total unit time spent today was greater than 15 minutes. Greater than 50% of total time was spent with the patient and/or patient's relatives and/or coordination of patient's care.     Patient's rights, confidentiality, exceptions to confidentiality, use of electronic medical record including appropriate staff access to medical record regarding behavioral health services and consent to treatment were reviewed.    Note Share:     This note was not shared with the patient due to reasonable likelihood of causing patient harm     This note has been constructed using a voice recognition system.    There may be translation, syntax, or grammatical errors. If you have any questions, please contact the dictating provider.    Jordan Christopher Holter, DO 05/21/24

## 2024-05-21 NOTE — PROGRESS NOTES
Psychiatric Progress Note - Department of Behavioral Health   Alberto Berumen 28 y.o. male MRN: [id number]  Unit/Bed#: [facility] 101-02 Encounter: [id number]    ASSESSMENT & PLAN     Diagnoses:   Principal Problem:    Schizoaffective disorder, bipolar type (HCC)  Active Problems:    GERD (gastroesophageal reflux disease)    Medical clearance for psychiatric admission    Tobacco abuse    T wave inversion in EKG    Chronic idiopathic constipation    Confluent and reticulate papillomatosis    Primary hypertension    Elevated hemoglobin A1c      Treatment Recommendations/Precautions:  Continue to promote patient participation in therapeutic milieu.  Continue medical management per medicine. Recommendations appreciated.   Continue previously prescribed psychotropic medication regimen; see below.  Continue behavioral health checks q.7 minutes.   Continue vitals per behavioral health unit protocol.  Discharge date per primary team including likely involvement with ACT team; 201 commitment status.    SUBJECTIVE     Patient evaluated this a.m. for continuity of care. Patient was discussed at length with nursing and treatment team. Per nursing, patient remains calm, cooperative, intermittently interactive in the milieu, adherent to his medications less any acute adverse effects. No acute distress is noted throughout evaluation, having possessed previous complaints pertaining to chest pain/discomfort, denying at present time, appearing to have tolerated prior instance of ECT without incident. Alberto Berumen denies suicidal/homicidal ideation in addition to thoughts of self-injury, receptive to crisis planning provided by this writer, contacting for safety in the inpatient setting, admitting to an ability to appropriately confide in staff including this writer. Patient appears scant and sparse in interaction involving this writer, superficial, denying any/all psychiatric complaints/concerns despite previous complaints  pertaining to dysphoric mood including depression accompanying auditory hallucinations, appearing suspicious and paranoid.     PSYCHIATRIC REVIEW OF SYSTEMS     Behavior over the last 24 hours:  unchanged  Sleep: adequate  Appetite: adequate  Medication side effects: No    REVIEW OF SYSTEMS   Review of systems: no complaints    OBJECTIVE     Vital Signs in Past 24 Hours:  Temp:  [98 °F (36.7 °C)] 98 °F (36.7 °C)  HR:  [] 102  Resp:  [18] 18  BP: (135-145)/(81-90) 138/90    Intake/Output in Past 24 hours:  No intake/output data recorded.  No intake/output data recorded.        Laboratory Results:  I have personally reviewed all pertinent laboratory/tests results.  Most Recent Labs:   Lab Results   Component Value Date    WBC 8.88 05/20/2024    RBC 5.12 05/20/2024    HGB 12.4 05/20/2024    HCT 40.7 05/20/2024     05/20/2024    RDW 15.1 05/20/2024    NEUTROABS 5.67 05/20/2024    SODIUM 139 04/01/2024    K 4.1 04/01/2024     04/01/2024    CO2 27 04/01/2024    BUN 12 04/01/2024    CREATININE 0.90 04/01/2024    GLUC 120 04/01/2024    GLUF 97 01/11/2024    CALCIUM 9.3 04/01/2024    AST 23 04/01/2024    ALT 53 (H) 04/01/2024    ALKPHOS 84 04/01/2024    TP 7.2 04/01/2024    ALB 4.0 04/01/2024    TBILI 0.23 04/01/2024    CHOLESTEROL 156 03/14/2024    HDL 44 03/14/2024    TRIG 133 03/14/2024    LDLCALC 85 03/14/2024    NONHDLC 112 03/14/2024    LITHIUM 0.61 01/09/2024    [id number] 1.062 11/15/2023    HGBA1C 5.0 04/01/2024    EAG 97 04/01/2024     Labs in last 72 hours:   Recent Labs     05/20/24  1122   WBC 8.88   RBC 5.12   HGB 12.4   HCT 40.7      RDW 15.1   NEUTROABS 5.67     Admission Labs:   No results displayed because visit has over 200 results.          Behavioral Health Medications: all current active meds have been reviewed, continue current psychiatric medications, and current meds:   Current Facility-Administered Medications   Medication Dose Route Frequency    acetaminophen (TYLENOL)  tablet 650 mg  650 mg Oral Q4H PRN    acetaminophen (TYLENOL) tablet 650 mg  650 mg Oral Q6H PRN    aluminum-magnesium hydroxide-simethicone (MAALOX) oral suspension 30 mL  30 mL Oral Q4H PRN    amLODIPine (NORVASC) tablet 5 mg  5 mg Oral Daily    Artificial Tears ophthalmic solution 1 drop  1 drop Both Eyes Q3H PRN    atropine (ISOPTO ATROPINE) 1 % ophthalmic solution 1 drop  1 drop Sublingual HS    atropine (ISOPTO ATROPINE) 1 % ophthalmic solution 1 drop  1 drop Sublingual Daily PRN    haloperidol lactate (HALDOL) injection 2.5 mg  2.5 mg Intramuscular Q4H PRN Max 4/day    And    LORazepam (ATIVAN) injection 1 mg  1 mg Intramuscular Q4H PRN Max 4/day    And    benztropine (COGENTIN) injection 0.5 mg  0.5 mg Intramuscular Q4H PRN Max 4/day    benztropine (COGENTIN) injection 1 mg  1 mg Intramuscular Q4H PRN Max 6/day    haloperidol lactate (HALDOL) injection 5 mg  5 mg Intramuscular Q4H PRN Max 4/day    And    LORazepam (ATIVAN) injection 2 mg  2 mg Intramuscular Q4H PRN Max 4/day    And    benztropine (COGENTIN) injection 1 mg  1 mg Intramuscular Q4H PRN Max 4/day    benztropine (COGENTIN) tablet 1 mg  1 mg Oral Q4H PRN Max 6/day    benztropine (COGENTIN) tablet 1 mg  1 mg Oral Q4H PRN Max 6/day    bisacodyl (DULCOLAX) rectal suppository 10 mg  10 mg Rectal Daily PRN    cloZAPine (CLOZARIL) tablet 500 mg  500 mg Oral HS    cyanocobalamin (VITAMIN B-12) tablet 1,000 mcg  1,000 mcg Oral Daily    hydrOXYzine HCL (ATARAX) tablet 50 mg  50 mg Oral Q6H PRN Max 4/day    Or    diphenhydrAMINE (BENADRYL) injection 50 mg  50 mg Intramuscular Q6H PRN    hydrOXYzine HCL (ATARAX) tablet 50 mg  50 mg Oral Q6H PRN Max 4/day    Or    diphenhydrAMINE (BENADRYL) injection 50 mg  50 mg Intramuscular Q6H PRN    diphenhydrAMINE-zinc acetate (BENADRYL) 2-0.1 % cream   Topical BID PRN    ergocalciferol (VITAMIN D2) capsule 50,000 Units  50,000 Units Oral Weekly    escitalopram (LEXAPRO) tablet 20 mg  20 mg Oral Daily    haloperidol  (HALDOL) tablet 1 mg  1 mg Oral Q6H PRN    haloperidol (HALDOL) tablet 2.5 mg  2.5 mg Oral Q4H PRN Max 4/day    haloperidol (HALDOL) tablet 5 mg  5 mg Oral Q4H PRN Max 4/day    hydrocortisone 1 % ointment   Topical 4x Daily PRN    hydrOXYzine HCL (ATARAX) tablet 100 mg  100 mg Oral Q6H PRN Max 4/day    Or    LORazepam (ATIVAN) injection 2 mg  2 mg Intramuscular Q6H PRN    hydrOXYzine HCL (ATARAX) tablet 100 mg  100 mg Oral Q6H PRN Max 4/day    Or    LORazepam (ATIVAN) injection 2 mg  2 mg Intramuscular Q6H PRN    hydrOXYzine HCL (ATARAX) tablet 25 mg  25 mg Oral Q6H PRN Max 4/day    ibuprofen (MOTRIN) tablet 600 mg  600 mg Oral Q8H PRN    melatonin tablet 3 mg  3 mg Oral HS    metFORMIN (GLUCOPHAGE) tablet 500 mg  500 mg Oral Daily With Breakfast    methocarbamol (ROBAXIN) tablet 500 mg  500 mg Oral Q6H PRN    nicotine polacrilex (NICORETTE) gum 2 mg  2 mg Oral Q4H PRN    OLANZapine (ZyPREXA) tablet 5 mg  5 mg Oral Q4H PRN Max 3/day    Or    OLANZapine (ZyPREXA) IM injection 2.5 mg  2.5 mg Intramuscular Q4H PRN Max 3/day    OLANZapine (ZyPREXA) tablet 5 mg  5 mg Oral Q3H PRN Max 3/day    Or    OLANZapine (ZyPREXA) IM injection 5 mg  5 mg Intramuscular Q3H PRN Max 3/day    OLANZapine (ZyPREXA) tablet 2.5 mg  2.5 mg Oral Q4H PRN Max 6/day    ondansetron (ZOFRAN-ODT) dispersible tablet 4 mg  4 mg Oral Q6H PRN    polyethylene glycol (MIRALAX) packet 17 g  17 g Oral Daily    polyethylene glycol (MIRALAX) packet 17 g  17 g Oral Daily PRN    propranolol (INDERAL) tablet 10 mg  10 mg Oral Q12H KAYLIE    risperiDONE (RisperDAL) tablet 2 mg  2 mg Oral HS    senna-docusate sodium (SENOKOT S) 8.6-50 mg per tablet 1 tablet  1 tablet Oral Daily PRN    senna-docusate sodium (SENOKOT S) 8.6-50 mg per tablet 2 tablet  2 tablet Oral BID    traZODone (DESYREL) tablet 50 mg  50 mg Oral HS PRN    white petrolatum-mineral oil (EUCERIN,HYDROCERIN) cream   Topical TID PRN   .    Risks, benefits and possible side effects of Medications:  "  Risks, benefits, and possible side effects of medications explained to patient and patient verbalizes understanding.      Mental Status Evaluation:  Appearance:  casually dressed, disheveled, and older than stated age   Behavior:  psychomotor retardation, superficial, suspicious   Speech:  soft and scant   Mood:  euthymic; \"fine\"   Affect:  mood-incongruent and restricted   Language sparse   Thought Process:  concrete   Thought Content:  Appears paranoid   Perceptual Disturbances: None although appears internally preoccupied   Risk Potential: Suicidal Ideations none, Homicidal Ideations none, and Potential for Aggression No   Sensorium:  person, place, and time/date   Cognition:  recent and remote memory grossly intact   Consciousness:  alert and awake    Attention: attention span appeared shorter than expected for age   Insight:  limited   Judgment: limited   Intellect Not assessed   Gait/Station: normal gait/station and normal balance   Motor Activity: no abnormal movements     Memory: Short and long term memory  fair     Progress Toward Goals: unchanged, as evidenced by their participation (or lack thereof) in individual, social and therapeutic milieu in addition to adherence to their medication regimen.    Recommended Treatment:   See above for assessment and plan.    Inpatient Psychiatric Certification: Based upon physical, mental and social evaluations, I certify that inpatient psychiatric services are medically necessary for this patient for a duration of greater than 2 midnights for the treatment of Schizoaffective disorder, bipolar type (HCC) including psychotropic medication management, participation in the therapeutic milieu and referrals as indicated. Available alternative community resources do not meet the patient's mental health care needs. I further attest that an established written individualized plan of care has been implemented and is outlined in the patient's medical " records.    Counseling/Coordination of Care    Total unit time spent today was greater than 15 minutes. Greater than 50% of total time was spent with the patient and/or patient's relatives and/or coordination of patient's care.     Patient's rights, confidentiality, exceptions to confidentiality, use of electronic medical record including appropriate staff access to medical record regarding behavioral health services and consent to treatment were reviewed.    Note Share:     This note was not shared with the patient due to reasonable likelihood of causing patient harm     This note has been constructed using a voice recognition system.    There may be translation, syntax, or grammatical errors. If you have any questions, please contact the dictating provider.    Jordan Christopher Holter, DO 05/20/24

## 2024-05-21 NOTE — NURSING NOTE
"Pt is accepting of medications without incidence and meal compliant. Pt is polite, pleasant and social with peers. Pt attends groups and plays cards with peers. Pt brightens on approach, but continues to report AH, however states \" they're just there, the same\". Pt offers no new concerns.   "

## 2024-05-21 NOTE — PROGRESS NOTES
05/21/24 0741   Team Meeting   Meeting Type Daily Rounds   Team Members Present   Team Members Present Physician;Nurse;;Other (Discipline and Name)   Patient/Family Present   Patient Present No   Patient's Family Present No     In attendance:  Jessica Stives PA-C Dr. Jordan Holter, HOLLI Weiss, RN  Luisana Alvarado, [facility]  Carla Lux, FREDERICKW    Groups: 7/8    Pt had EKG and labs completed that both showed abnormal results. Pt reports voices are ongoing. Pt is social and visible with no noted bx issues. Pt will have next ECT 5/24.

## 2024-05-21 NOTE — SOCIAL WORK
TC from pt's aunt-   In person visit scheduled for tomorrow, 5/22.  Aunt will be arriving between 1-1:30 for a 30 minute visit.

## 2024-05-21 NOTE — NURSING NOTE
"Alberto has been awake, alert, and visible intermittently out in the milieu.  Pt ate 100% supper.  Pt requested prn Nicotine gum and 2 mg po given at 1737. Pt social with select peers.  Pt polite and cooperative.  Affect flat, blunted.  Pt rated his depression a #7/10 and anxiety a #2/4.  Pt continues to hear voices and stated that they are \"the same\" and say that they are going to kill him and his family.  Pt watches tv to help block the voices. Pt has not verbalized any delusions.  Pt spends time playing cards with peers also.  Pt attended and participated in evening coping skills group, nursing group,  wrap up group, and had snack after with peers.  Pt cooperative with taking scheduled medications as ordered and without incident but refused scheduled Atropine sublingual drops.  Pt maintained on continuous safety checks.  Will continue to monitor/assess for any changes in behavior.  "

## 2024-05-21 NOTE — NURSING NOTE
"Alberto has been awake, alert, and visible intermittently out in the milieu.  Pt ate 100% supper.  Pt social with select peers.  Pt  polite and cooperative.  Affect blunted.  Pt rated his depression a #7/10 and anxiety a #2/4.  Pt continues to hear voices and stated that they are \"the same\".  Pt has not verbalized any delusions.  Pt spends watching tv, listening to music on radio, and playing cards with peers with good interaction noted.  Pt attended and participated in evening coping skills group, wrap up group, and had snack after with peers.  Pt cooperative with taking scheduled medications as ordered and without incident but refuses scheduled Atropine sublingual drops.  Pt maintained on continuous safety checks.  Will continue to monitor/assess for any changes in behavior.  "

## 2024-05-21 NOTE — SOCIAL WORK
"SW reviewed and provided pt with printout of clear rules and expectations for virtual and in-person visits.     Pt was notified of in person visit time for tomorrow - between 1-1:30pm for a 30 minute visit with his aunt.   BRANDY checked in with pt who reports that the voices are \"getting worse\". Pt stated that he's excited to see his aunt and potentially his sisters and mom next week but does not yet feel ready to discharge home.   "

## 2024-05-22 PROCEDURE — 99232 SBSQ HOSP IP/OBS MODERATE 35: CPT | Performed by: PSYCHIATRY & NEUROLOGY

## 2024-05-22 RX ADMIN — RISPERIDONE 2 MG: 2 TABLET, FILM COATED ORAL at 21:05

## 2024-05-22 RX ADMIN — PROPRANOLOL HYDROCHLORIDE 10 MG: 10 TABLET ORAL at 21:04

## 2024-05-22 RX ADMIN — POLYETHYLENE GLYCOL 3350 17 G: 17 POWDER, FOR SOLUTION ORAL at 08:21

## 2024-05-22 RX ADMIN — AMLODIPINE BESYLATE 5 MG: 5 TABLET ORAL at 08:22

## 2024-05-22 RX ADMIN — PROPRANOLOL HYDROCHLORIDE 10 MG: 10 TABLET ORAL at 08:22

## 2024-05-22 RX ADMIN — ESCITALOPRAM OXALATE 20 MG: 10 TABLET, FILM COATED ORAL at 08:22

## 2024-05-22 RX ADMIN — METFORMIN HYDROCHLORIDE 500 MG: 500 TABLET ORAL at 08:22

## 2024-05-22 RX ADMIN — SENNOSIDES AND DOCUSATE SODIUM 2 TABLET: 8.6; 5 TABLET ORAL at 17:14

## 2024-05-22 RX ADMIN — SENNOSIDES AND DOCUSATE SODIUM 2 TABLET: 8.6; 5 TABLET ORAL at 08:22

## 2024-05-22 RX ADMIN — CYANOCOBALAMIN TAB 1000 MCG 1000 MCG: 1000 TAB at 08:22

## 2024-05-22 RX ADMIN — CLOZAPINE 500 MG: 100 TABLET ORAL at 21:04

## 2024-05-22 RX ADMIN — NICOTINE POLACRILEX 2 MG: 2 GUM, CHEWING ORAL at 08:22

## 2024-05-22 RX ADMIN — NICOTINE POLACRILEX 2 MG: 2 GUM, CHEWING ORAL at 20:10

## 2024-05-22 RX ADMIN — MELATONIN TAB 3 MG 3 MG: 3 TAB at 21:04

## 2024-05-22 NOTE — PROGRESS NOTES
05/22/24 0727   Team Meeting   Meeting Type Daily Rounds   Team Members Present   Team Members Present Physician;Nurse;;Other (Discipline and Name)   Patient/Family Present   Patient Present No   Patient's Family Present No     In attendance:  Jessica Stives, PA-C Dr. Jordan Holter, HOLLI Weiss, RN  Luisana Alvarado, \[facility]\""W  Carla Lux, FREDERICKW    Groups: 8/9    Pt is polite, social and engaged. Pt reports ongoing voices and expressed not feeling ready to discharge at this time. Pt has in person visit around 1/1:30 with his aunt today.

## 2024-05-22 NOTE — NURSING NOTE
"Patient is visible on unit and social with peers. Pt reports no s/s aside from voices that are \"..manageable, always there.\" Pt takes medications without issue and is pleasant and cooperative. Pt offers no complaints.   "

## 2024-05-22 NOTE — PLAN OF CARE
Problem: Alteration in Thoughts and Perception  Goal: Treatment Goal: Gain control of psychotic behaviors/thinking, reduce/eliminate presenting symptoms and demonstrate improved reality functioning upon discharge  Outcome: Progressing  Goal: Verbalize thoughts and feelings  Description: Interventions:  - Promote a nonjudgmental and trusting relationship with the patient through active listening and therapeutic communication  - Assess patient's level of functioning, behavior and potential for risk  - Engage patient in 1 on 1 interactions  - Encourage patient to express fears, feelings, frustrations, and discuss symptoms    - [place] patient to reality, help patient recognize reality-based thinking   - Administer medications as ordered and assess for potential side effects  - Provide the patient education related to the signs and symptoms of the illness and desired effects of prescribed medications  Outcome: Progressing  Goal: Refrain from acting on delusional thinking/internal stimuli  Description: Interventions:  - Monitor patient closely, per order   - Utilize least restrictive measures   - Set reasonable limits, give positive feedback for acceptable   - Administer medications as ordered and monitor of potential side effects  Outcome: Progressing  Goal: Agree to be compliant with medication regime, as prescribed and report medication side effects  Description: Interventions:  - Offer appropriate PRN medication and supervise ingestion; conduct AIMS, as needed   Outcome: Progressing  Goal: Attend and participate in unit activities, including therapeutic, recreational, and educational groups  Description: Interventions:  -Encourage Visitation and family involvement in care  Outcome: Progressing  Goal: Complete daily ADLs, including personal hygiene independently, as able  Description: Interventions:  - Observe, teach, and assist patient with ADLS  - Monitor and promote a balance of rest/activity, with adequate  nutrition and elimination   Outcome: Progressing     Problem: Ineffective Coping  Goal: Demonstrates healthy coping skills  Outcome: Progressing  Goal: Patient/Family participate in treatment and DC plans  Description: Interventions:  - Provide therapeutic environment  Outcome: Progressing  Goal: Patient/Family verbalizes awareness of resources  Outcome: Progressing     Problem: Depression  Goal: Refrain from harming self  Description: Interventions:  - Monitor patient closely, per order   - Supervise medication ingestion, monitor effects and side effects   Outcome: Progressing  Goal: Refrain from isolation  Description: Interventions:  - Develop a trusting relationship   - Encourage socialization   Outcome: Progressing  Goal: Refrain from self-neglect  Outcome: Progressing  Goal: Attend and participate in unit activities, including therapeutic, recreational, and educational groups  Description: Interventions:  - Provide therapeutic and educational activities daily, encourage attendance and participation, and document same in the medical record   Outcome: Progressing  Goal: Complete daily ADLs, including personal hygiene independently, as able  Description: Interventions:  - Observe, teach, and assist patient with ADLS  -  Monitor and promote a balance of rest/activity, with adequate nutrition and elimination   Outcome: Progressing     Problem: Anxiety  Goal: Anxiety is at manageable level  Description: Interventions:  - Assess and monitor patient's anxiety level.   - Monitor for signs and symptoms (heart palpitations, chest pain, shortness of breath, headaches, nausea, feeling jumpy, restlessness, irritable, apprehensive).   - Collaborate with interdisciplinary team and initiate plan and interventions as ordered.  - [place] patient to unit/surroundings  - Explain treatment plan  - Encourage participation in care  - Encourage verbalization of concerns/fears  - Identify coping mechanisms  - Assist in developing  anxiety-reducing skills  - Administer/offer alternative therapies  - Limit or eliminate stimulants  Outcome: Progressing     Problem: Alteration in Orientation  Goal: Treatment Goal: Demonstrate a reduction of confusion and improved orientation to person, place, time and/or situation upon discharge, according to optimum baseline/ability  Outcome: Progressing  Goal: Interact with staff daily  Description: Interventions:  - Assess and re-assess patient's level of orientation  - Engage patient in 1 on 1 interactions, daily, for a minimum of 15 minutes   - Establish rapport/trust with patient   Outcome: Progressing  Goal: Attend and participate in unit activities, including therapeutic, recreational, and educational groups  Description: Interventions:  - Provide therapeutic and educational activities daily, encourage attendance and participation, and document same in the medical record   - Provide appropriate opportunities for reminiscence   - Provide a consistent daily routine   - Encourage family contact/visitation   Outcome: Progressing  Goal: Complete daily ADLs, including personal hygiene independently, as able  Description: Interventions:  - Observe, teach, and assist patient with ADLS  Outcome: Progressing

## 2024-05-22 NOTE — NURSING NOTE
"Alberto has been awake, alert, and visible intermittently out in the milieu.  Pt ate 100% supper.  Pt social with select peers.  Pleasant, polite, and cooperative.  Affect blunted.  Pt rated his depression a #7/10 and anxiety a #2/4.  Pt stated that he feels \"stressed out\" today because of the \"voices\".  Pt stated that they continue to say that they are going to kill him and his family.  Pt watches tv and plays cards with peers to help block the voices.  Pt has not verbalized any delusions.  Pt requested prn Nicotine gum and 2 mg po given at 2010.  Pt attended and participated in evening coping skills group,  nursing group, wrap up group, and had snack after with peers.  Pt cooperative with taking scheduled medications as ordered and without incident.  Pt maintained on continuous safety checks.  Will continue to monitor/assess for any changes in behavior.  "

## 2024-05-22 NOTE — PROGRESS NOTES
Psychiatric Progress Note - Department of Behavioral Health   Alberto Berumen 28 y.o. male MRN: [id number]  Unit/Bed#: [facility] 101-02 Encounter: [id number]    ASSESSMENT & PLAN     Diagnoses:   Principal Problem:    Schizoaffective disorder, bipolar type (HCC)  Active Problems:    GERD (gastroesophageal reflux disease)    Medical clearance for psychiatric admission    Tobacco abuse    T wave inversion in EKG    Chronic idiopathic constipation    Confluent and reticulate papillomatosis    Primary hypertension    Elevated hemoglobin A1c      Treatment Recommendations/Precautions:  Continue to promote patient participation in therapeutic milieu.  Continue medical management per medicine.  Continue previously prescribed psychotropic medication regimen; see below.  Continue behavioral health checks q.7 minutes.   Continue vitals per behavioral health unit protocol.  Discharge date per primary team; 201 commitment status.    SUBJECTIVE     Patient evaluated this a.m. for continuity of care. Patient was discussed at length with nursing and treatment team. Per nursing, patient remains calm, cooperative, intermittently interactive in the milieu, adherent to his medications without any acute adverse effects. No acute distress is noted throughout evaluation. Alberto Berumen denies suicidal/homicidal ideation in addition to thoughts of self-injury, receptive to crisis planning provided by this writer, contacting for safety in the inpatient setting, admitting to an ability to appropriately confide in staff including this writer.  Patient denies any/all psychiatric complaints/concerns, somewhat superficial on approach, remaining scant and sparse in interaction involving this writer, remaining suspicious and paranoid    PSYCHIATRIC REVIEW OF SYSTEMS     Behavior over the last 24 hours:  unchanged  Sleep: adequate  Appetite: adequate  Medication side effects: No    REVIEW OF SYSTEMS   Review of systems: no  complaints    OBJECTIVE     Vital Signs in Past 24 Hours:  Temp:  [97.5 °F (36.4 °C)] 97.5 °F (36.4 °C)  HR:  [] 88  Resp:  [18] 18  BP: (125-168)/(70-97) 125/70    Intake/Output in Past 24 hours:  No intake/output data recorded.  No intake/output data recorded.        Laboratory Results:  I have personally reviewed all pertinent laboratory/tests results.  Most Recent Labs:   Lab Results   Component Value Date    WBC 8.88 05/20/2024    RBC 5.12 05/20/2024    HGB 12.4 05/20/2024    HCT 40.7 05/20/2024     05/20/2024    RDW 15.1 05/20/2024    NEUTROABS 5.67 05/20/2024    SODIUM 139 04/01/2024    K 4.1 04/01/2024     04/01/2024    CO2 27 04/01/2024    BUN 12 04/01/2024    CREATININE 0.90 04/01/2024    GLUC 120 04/01/2024    GLUF 97 01/11/2024    CALCIUM 9.3 04/01/2024    AST 23 04/01/2024    ALT 53 (H) 04/01/2024    ALKPHOS 84 04/01/2024    TP 7.2 04/01/2024    ALB 4.0 04/01/2024    TBILI 0.23 04/01/2024    CHOLESTEROL 156 03/14/2024    HDL 44 03/14/2024    TRIG 133 03/14/2024    LDLCALC 85 03/14/2024    NONHDLC 112 03/14/2024    LITHIUM 0.61 01/09/2024    NHX0LGCEVTEE 1.062 11/15/2023    HGBA1C 5.0 04/01/2024    EAG 97 04/01/2024     Labs in last 72 hours:   Recent Labs     05/20/24  1122   WBC 8.88   RBC 5.12   HGB 12.4   HCT 40.7      RDW 15.1   NEUTROABS 5.67     Admission Labs:   No results displayed because visit has over 200 results.          Behavioral Health Medications: all current active meds have been reviewed, continue current psychiatric medications, and current meds:   Current Facility-Administered Medications   Medication Dose Route Frequency    acetaminophen (TYLENOL) tablet 650 mg  650 mg Oral Q4H PRN    acetaminophen (TYLENOL) tablet 650 mg  650 mg Oral Q6H PRN    aluminum-magnesium hydroxide-simethicone (MAALOX) oral suspension 30 mL  30 mL Oral Q4H PRN    amLODIPine (NORVASC) tablet 5 mg  5 mg Oral Daily    Artificial Tears ophthalmic solution 1 drop  1 drop Both Eyes Q3H  PRN    atropine (ISOPTO ATROPINE) 1 % ophthalmic solution 1 drop  1 drop Sublingual HS    atropine (ISOPTO ATROPINE) 1 % ophthalmic solution 1 drop  1 drop Sublingual Daily PRN    haloperidol lactate (HALDOL) injection 2.5 mg  2.5 mg Intramuscular Q4H PRN Max 4/day    And    LORazepam (ATIVAN) injection 1 mg  1 mg Intramuscular Q4H PRN Max 4/day    And    benztropine (COGENTIN) injection 0.5 mg  0.5 mg Intramuscular Q4H PRN Max 4/day    benztropine (COGENTIN) injection 1 mg  1 mg Intramuscular Q4H PRN Max 6/day    haloperidol lactate (HALDOL) injection 5 mg  5 mg Intramuscular Q4H PRN Max 4/day    And    LORazepam (ATIVAN) injection 2 mg  2 mg Intramuscular Q4H PRN Max 4/day    And    benztropine (COGENTIN) injection 1 mg  1 mg Intramuscular Q4H PRN Max 4/day    benztropine (COGENTIN) tablet 1 mg  1 mg Oral Q4H PRN Max 6/day    benztropine (COGENTIN) tablet 1 mg  1 mg Oral Q4H PRN Max 6/day    bisacodyl (DULCOLAX) rectal suppository 10 mg  10 mg Rectal Daily PRN    cloZAPine (CLOZARIL) tablet 500 mg  500 mg Oral HS    cyanocobalamin (VITAMIN B-12) tablet 1,000 mcg  1,000 mcg Oral Daily    hydrOXYzine HCL (ATARAX) tablet 50 mg  50 mg Oral Q6H PRN Max 4/day    Or    diphenhydrAMINE (BENADRYL) injection 50 mg  50 mg Intramuscular Q6H PRN    hydrOXYzine HCL (ATARAX) tablet 50 mg  50 mg Oral Q6H PRN Max 4/day    Or    diphenhydrAMINE (BENADRYL) injection 50 mg  50 mg Intramuscular Q6H PRN    diphenhydrAMINE-zinc acetate (BENADRYL) 2-0.1 % cream   Topical BID PRN    escitalopram (LEXAPRO) tablet 20 mg  20 mg Oral Daily    haloperidol (HALDOL) tablet 1 mg  1 mg Oral Q6H PRN    haloperidol (HALDOL) tablet 2.5 mg  2.5 mg Oral Q4H PRN Max 4/day    haloperidol (HALDOL) tablet 5 mg  5 mg Oral Q4H PRN Max 4/day    hydrocortisone 1 % ointment   Topical 4x Daily PRN    hydrOXYzine HCL (ATARAX) tablet 100 mg  100 mg Oral Q6H PRN Max 4/day    Or    LORazepam (ATIVAN) injection 2 mg  2 mg Intramuscular Q6H PRN    hydrOXYzine HCL  (ATARAX) tablet 100 mg  100 mg Oral Q6H PRN Max 4/day    Or    LORazepam (ATIVAN) injection 2 mg  2 mg Intramuscular Q6H PRN    hydrOXYzine HCL (ATARAX) tablet 25 mg  25 mg Oral Q6H PRN Max 4/day    ibuprofen (MOTRIN) tablet 600 mg  600 mg Oral Q8H PRN    melatonin tablet 3 mg  3 mg Oral HS    metFORMIN (GLUCOPHAGE) tablet 500 mg  500 mg Oral Daily With Breakfast    methocarbamol (ROBAXIN) tablet 500 mg  500 mg Oral Q6H PRN    nicotine polacrilex (NICORETTE) gum 2 mg  2 mg Oral Q4H PRN    OLANZapine (ZyPREXA) tablet 5 mg  5 mg Oral Q4H PRN Max 3/day    Or    OLANZapine (ZyPREXA) IM injection 2.5 mg  2.5 mg Intramuscular Q4H PRN Max 3/day    OLANZapine (ZyPREXA) tablet 5 mg  5 mg Oral Q3H PRN Max 3/day    Or    OLANZapine (ZyPREXA) IM injection 5 mg  5 mg Intramuscular Q3H PRN Max 3/day    OLANZapine (ZyPREXA) tablet 2.5 mg  2.5 mg Oral Q4H PRN Max 6/day    ondansetron (ZOFRAN-ODT) dispersible tablet 4 mg  4 mg Oral Q6H PRN    polyethylene glycol (MIRALAX) packet 17 g  17 g Oral Daily    polyethylene glycol (MIRALAX) packet 17 g  17 g Oral Daily PRN    propranolol (INDERAL) tablet 10 mg  10 mg Oral Q12H KAYLIE    risperiDONE (RisperDAL) tablet 2 mg  2 mg Oral HS    senna-docusate sodium (SENOKOT S) 8.6-50 mg per tablet 1 tablet  1 tablet Oral Daily PRN    senna-docusate sodium (SENOKOT S) 8.6-50 mg per tablet 2 tablet  2 tablet Oral BID    traZODone (DESYREL) tablet 50 mg  50 mg Oral HS PRN    white petrolatum-mineral oil (EUCERIN,HYDROCERIN) cream   Topical TID PRN   .    Risks, benefits and possible side effects of Medications:   Risks, benefits, and possible side effects of medications explained to patient and patient verbalizes understanding.      Mental Status Evaluation:  Appearance:  casually dressed, disheveled, and older than stated age   Behavior:  psychomotor retardation, superficial, suspicious   Speech:  normal pitch and normal volume   Mood:  euthymic   Affect:  blunted and mood-incongruent   Language  sparse   Thought Process:  concrete   Thought Content:  Paranoid   Perceptual Disturbances: None remaining internally preoccupied   Risk Potential: Suicidal Ideations none, Homicidal Ideations none, and Potential for Aggression No   Sensorium:  person, place, and time/date   Cognition:  recent and remote memory grossly intact   Consciousness:  alert and awake    Attention: attention span appeared shorter than expected for age   Insight:  limited   Judgment: limited   Intellect Not assessed   Gait/Station: normal gait/station and normal balance   Motor Activity: no abnormal movements     Memory: Short and long term memory  fair     Progress Toward Goals: unchanged, as evidenced by their participation (or lack thereof) in individual, social and therapeutic milieu in addition to adherence to their medication regimen.    Recommended Treatment:   See above for assessment and plan.    Inpatient Psychiatric Certification: Based upon physical, mental and social evaluations, I certify that inpatient psychiatric services are medically necessary for this patient for a duration of greater than 2 midnights for the treatment of Schizoaffective disorder, bipolar type (HCC) including psychotropic medication management, participation in the therapeutic milieu and referrals as indicated. Available alternative community resources do not meet the patient's mental health care needs. I further attest that an established written individualized plan of care has been implemented and is outlined in the patient's medical records.    Counseling/Coordination of Care    Total unit time spent today was greater than 15 minutes. Greater than 50% of total time was spent with the patient and/or patient's relatives and/or coordination of patient's care.     Patient's rights, confidentiality, exceptions to confidentiality, use of electronic medical record including appropriate staff access to medical record regarding behavioral health services and  consent to treatment were reviewed.    Note Share:     This note was not shared with the patient due to reasonable likelihood of causing patient harm     This note has been constructed using a voice recognition system.    There may be translation, syntax, or grammatical errors. If you have any questions, please contact the dictating provider.    Jordan Christopher Holter, DO 05/22/24

## 2024-05-23 PROCEDURE — 99232 SBSQ HOSP IP/OBS MODERATE 35: CPT | Performed by: PSYCHIATRY & NEUROLOGY

## 2024-05-23 RX ORDER — SODIUM CHLORIDE, SODIUM LACTATE, POTASSIUM CHLORIDE, CALCIUM CHLORIDE 600; 310; 30; 20 MG/100ML; MG/100ML; MG/100ML; MG/100ML
50 INJECTION, SOLUTION INTRAVENOUS CONTINUOUS
OUTPATIENT
Start: 2024-05-23

## 2024-05-23 RX ADMIN — SENNOSIDES AND DOCUSATE SODIUM 2 TABLET: 8.6; 5 TABLET ORAL at 17:11

## 2024-05-23 RX ADMIN — PROPRANOLOL HYDROCHLORIDE 10 MG: 10 TABLET ORAL at 09:07

## 2024-05-23 RX ADMIN — SENNOSIDES AND DOCUSATE SODIUM 2 TABLET: 8.6; 5 TABLET ORAL at 09:07

## 2024-05-23 RX ADMIN — CLOZAPINE 500 MG: 100 TABLET ORAL at 21:09

## 2024-05-23 RX ADMIN — MELATONIN TAB 3 MG 3 MG: 3 TAB at 21:09

## 2024-05-23 RX ADMIN — METFORMIN HYDROCHLORIDE 500 MG: 500 TABLET ORAL at 09:07

## 2024-05-23 RX ADMIN — POLYETHYLENE GLYCOL 3350 17 G: 17 POWDER, FOR SOLUTION ORAL at 09:07

## 2024-05-23 RX ADMIN — NICOTINE POLACRILEX 2 MG: 2 GUM, CHEWING ORAL at 09:07

## 2024-05-23 RX ADMIN — AMLODIPINE BESYLATE 5 MG: 5 TABLET ORAL at 09:07

## 2024-05-23 RX ADMIN — NICOTINE POLACRILEX 2 MG: 2 GUM, CHEWING ORAL at 15:02

## 2024-05-23 RX ADMIN — PROPRANOLOL HYDROCHLORIDE 10 MG: 10 TABLET ORAL at 21:08

## 2024-05-23 RX ADMIN — CYANOCOBALAMIN TAB 1000 MCG 1000 MCG: 1000 TAB at 09:07

## 2024-05-23 RX ADMIN — ESCITALOPRAM OXALATE 20 MG: 10 TABLET, FILM COATED ORAL at 09:07

## 2024-05-23 RX ADMIN — RISPERIDONE 2 MG: 2 TABLET, FILM COATED ORAL at 21:09

## 2024-05-23 NOTE — PROGRESS NOTES
Psychiatric Progress Note - Department of Behavioral Health   Alberto Berumen 28 y.o. male MRN: [id number]  Unit/Bed#: [facility] 101-02 Encounter: [id number]    ASSESSMENT & PLAN     Diagnoses:   Principal Problem:    Schizoaffective disorder, bipolar type (HCC)  Active Problems:    GERD (gastroesophageal reflux disease)    Medical clearance for psychiatric admission    Tobacco abuse    T wave inversion in EKG    Chronic idiopathic constipation    Confluent and reticulate papillomatosis    Primary hypertension    Elevated hemoglobin A1c      Treatment Recommendations/Precautions:  Continue to promote patient participation in therapeutic milieu.  Continue medical management per medicine.  Continue previously prescribed psychotropic medication regimen; see below.  Continue behavioral health checks q.7 minutes.   Continue vitals per behavioral health unit protocol.  Discharge date per primary team; 201 commitment status.    SUBJECTIVE     Patient evaluated this a.m. for continuity of care. Patient was discussed at length with nursing and treatment team. Per nursing, patient remains calm and cooperative, present and appropriately interactive in the milieu, adherent to his medications without any acute adverse effects. No acute distress is noted throughout evaluation denying any cardiovascular signs/symptoms including not limited to chest pain/discomfort and shortness of breath. Alberto Berumen denies suicidal/homicidal ideation in addition to thoughts of self-injury, receptive to crisis planning provided by this writer, contacting for safety in the inpatient setting, admitting to an ability to appropriately confide in staff including this writer.  Patient admits to depressed and anxious mood accompanied by intermittent instances of auditory hallucinations, slightly improved in comparison to previous evaluation, somewhat suspicious and paranoid, agreeable to ECT in the a.m.    PSYCHIATRIC REVIEW OF SYSTEMS     Behavior  "over the last 24 hours:  unchanged  Sleep: adequate  Appetite: adequate  Medication side effects: No    REVIEW OF SYSTEMS   Review of systems: no complaints    OBJECTIVE     Vital Signs in Past 24 Hours:  Temp:  [97.7 °F (36.5 °C)-97.8 °F (36.6 °C)] 97.7 °F (36.5 °C)  HR:  [80-99] 90  Resp:  [18-20] 20  BP: (120-130)/(60-83) 130/60    Intake/Output in Past 24 hours:  No intake/output data recorded.  No intake/output data recorded.        Laboratory Results:  I have personally reviewed all pertinent laboratory/tests results.  Most Recent Labs:   Lab Results   Component Value Date    WBC 8.88 05/20/2024    RBC 5.12 05/20/2024    HGB 12.4 05/20/2024    HCT 40.7 05/20/2024     05/20/2024    RDW 15.1 05/20/2024    NEUTROABS 5.67 05/20/2024    SODIUM 139 04/01/2024    K 4.1 04/01/2024     04/01/2024    CO2 27 04/01/2024    BUN 12 04/01/2024    CREATININE 0.90 04/01/2024    GLUC 120 04/01/2024    GLUF 97 01/11/2024    CALCIUM 9.3 04/01/2024    AST 23 04/01/2024    ALT 53 (H) 04/01/2024    ALKPHOS 84 04/01/2024    TP 7.2 04/01/2024    ALB 4.0 04/01/2024    TBILI 0.23 04/01/2024    CHOLESTEROL 156 03/14/2024    HDL 44 03/14/2024    TRIG 133 03/14/2024    LDLCALC 85 03/14/2024    NONHDLC 112 03/14/2024    LITHIUM 0.61 01/09/2024    TKY0ULAMBHJT 1.062 11/15/2023    HGBA1C 5.0 04/01/2024    EAG 97 04/01/2024     Labs in last 72 hours: No results for input(s): \"WBC\", \"RBC\", \"HGB\", \"HCT\", \"PLT\", \"RDW\", \"TOTANEUTABS\", \"NEUTROABS\", \"SODIUM\", \"K\", \"CL\", \"CO2\", \"BUN\", \"CREATININE\", \"GLUC\", \"GLUF\", \"CALCIUM\", \"AST\", \"ALT\", \"ALKPHOS\", \"TP\", \"ALB\", \"TBILI\", \"CHOLESTEROL\", \"HDL\", \"TRIG\", \"LDLCALC\", \"VALPROICTOT\", \"CARBAMAZEPIN\", \"LITHIUM\", \"AMMONIA\", \"SPM2MFHAEMVI\", \"FREET4\", \"T3FREE\", \"PREGTESTUR\", \"PREGSERUM\", \"HCG\", \"HCGQUANT\", \"RPR\" in the last 72 hours.  Admission Labs:   No results displayed because visit has over 200 results.          Behavioral Health Medications: all current active meds have been reviewed, continue " current psychiatric medications, and current meds:   Current Facility-Administered Medications   Medication Dose Route Frequency    acetaminophen (TYLENOL) tablet 650 mg  650 mg Oral Q4H PRN    acetaminophen (TYLENOL) tablet 650 mg  650 mg Oral Q6H PRN    aluminum-magnesium hydroxide-simethicone (MAALOX) oral suspension 30 mL  30 mL Oral Q4H PRN    amLODIPine (NORVASC) tablet 5 mg  5 mg Oral Daily    Artificial Tears ophthalmic solution 1 drop  1 drop Both Eyes Q3H PRN    atropine (ISOPTO ATROPINE) 1 % ophthalmic solution 1 drop  1 drop Sublingual HS    atropine (ISOPTO ATROPINE) 1 % ophthalmic solution 1 drop  1 drop Sublingual Daily PRN    haloperidol lactate (HALDOL) injection 2.5 mg  2.5 mg Intramuscular Q4H PRN Max 4/day    And    LORazepam (ATIVAN) injection 1 mg  1 mg Intramuscular Q4H PRN Max 4/day    And    benztropine (COGENTIN) injection 0.5 mg  0.5 mg Intramuscular Q4H PRN Max 4/day    benztropine (COGENTIN) injection 1 mg  1 mg Intramuscular Q4H PRN Max 6/day    haloperidol lactate (HALDOL) injection 5 mg  5 mg Intramuscular Q4H PRN Max 4/day    And    LORazepam (ATIVAN) injection 2 mg  2 mg Intramuscular Q4H PRN Max 4/day    And    benztropine (COGENTIN) injection 1 mg  1 mg Intramuscular Q4H PRN Max 4/day    benztropine (COGENTIN) tablet 1 mg  1 mg Oral Q4H PRN Max 6/day    benztropine (COGENTIN) tablet 1 mg  1 mg Oral Q4H PRN Max 6/day    bisacodyl (DULCOLAX) rectal suppository 10 mg  10 mg Rectal Daily PRN    cloZAPine (CLOZARIL) tablet 500 mg  500 mg Oral HS    cyanocobalamin (VITAMIN B-12) tablet 1,000 mcg  1,000 mcg Oral Daily    hydrOXYzine HCL (ATARAX) tablet 50 mg  50 mg Oral Q6H PRN Max 4/day    Or    diphenhydrAMINE (BENADRYL) injection 50 mg  50 mg Intramuscular Q6H PRN    hydrOXYzine HCL (ATARAX) tablet 50 mg  50 mg Oral Q6H PRN Max 4/day    Or    diphenhydrAMINE (BENADRYL) injection 50 mg  50 mg Intramuscular Q6H PRN    diphenhydrAMINE-zinc acetate (BENADRYL) 2-0.1 % cream   Topical BID  PRN    escitalopram (LEXAPRO) tablet 20 mg  20 mg Oral Daily    haloperidol (HALDOL) tablet 1 mg  1 mg Oral Q6H PRN    haloperidol (HALDOL) tablet 2.5 mg  2.5 mg Oral Q4H PRN Max 4/day    haloperidol (HALDOL) tablet 5 mg  5 mg Oral Q4H PRN Max 4/day    hydrocortisone 1 % ointment   Topical 4x Daily PRN    hydrOXYzine HCL (ATARAX) tablet 100 mg  100 mg Oral Q6H PRN Max 4/day    Or    LORazepam (ATIVAN) injection 2 mg  2 mg Intramuscular Q6H PRN    hydrOXYzine HCL (ATARAX) tablet 100 mg  100 mg Oral Q6H PRN Max 4/day    Or    LORazepam (ATIVAN) injection 2 mg  2 mg Intramuscular Q6H PRN    hydrOXYzine HCL (ATARAX) tablet 25 mg  25 mg Oral Q6H PRN Max 4/day    ibuprofen (MOTRIN) tablet 600 mg  600 mg Oral Q8H PRN    melatonin tablet 3 mg  3 mg Oral HS    metFORMIN (GLUCOPHAGE) tablet 500 mg  500 mg Oral Daily With Breakfast    methocarbamol (ROBAXIN) tablet 500 mg  500 mg Oral Q6H PRN    nicotine polacrilex (NICORETTE) gum 2 mg  2 mg Oral Q4H PRN    OLANZapine (ZyPREXA) tablet 5 mg  5 mg Oral Q4H PRN Max 3/day    Or    OLANZapine (ZyPREXA) IM injection 2.5 mg  2.5 mg Intramuscular Q4H PRN Max 3/day    OLANZapine (ZyPREXA) tablet 5 mg  5 mg Oral Q3H PRN Max 3/day    Or    OLANZapine (ZyPREXA) IM injection 5 mg  5 mg Intramuscular Q3H PRN Max 3/day    OLANZapine (ZyPREXA) tablet 2.5 mg  2.5 mg Oral Q4H PRN Max 6/day    ondansetron (ZOFRAN-ODT) dispersible tablet 4 mg  4 mg Oral Q6H PRN    polyethylene glycol (MIRALAX) packet 17 g  17 g Oral Daily    polyethylene glycol (MIRALAX) packet 17 g  17 g Oral Daily PRN    propranolol (INDERAL) tablet 10 mg  10 mg Oral Q12H KAYLIE    risperiDONE (RisperDAL) tablet 2 mg  2 mg Oral HS    senna-docusate sodium (SENOKOT S) 8.6-50 mg per tablet 1 tablet  1 tablet Oral Daily PRN    senna-docusate sodium (SENOKOT S) 8.6-50 mg per tablet 2 tablet  2 tablet Oral BID    traZODone (DESYREL) tablet 50 mg  50 mg Oral HS PRN    white petrolatum-mineral oil (EUCERIN,HYDROCERIN) cream   Topical TID  PRN   .    Risks, benefits and possible side effects of Medications:   Risks, benefits, and possible side effects of medications explained to patient and patient verbalizes understanding.      Mental Status Evaluation:  Appearance:  casually dressed, disheveled, and older than stated age   Behavior:  psychomotor retardation, suspicious possessing intermittent eye contact   Speech:  normal pitch and normal volume   Mood:  anxious, depressed, and dysthymic   Affect:  blunted and mood-congruent   Language sparse   Thought Process:  goal directed   Thought Content:  Paranoid ideation   Perceptual Disturbances: Auditory hallucinations without commands appearing internally preoccupied and distracted   Risk Potential: Suicidal Ideations none, Homicidal Ideations none, and Potential for Aggression No   Sensorium:  person, place, and time/date   Cognition:  recent and remote memory grossly intact   Consciousness:  alert and awake    Attention: attention span appeared shorter than expected for age   Insight:  limited   Judgment: limited   Intellect Not assessed   Gait/Station: normal gait/station and normal balance   Motor Activity: no abnormal movements     Memory: Short and long term memory  fair     Progress Toward Goals: unchanged, as evidenced by their participation (or lack thereof) in individual, social and therapeutic milieu in addition to adherence to their medication regimen.    Recommended Treatment:   See above for assessment and plan.    Inpatient Psychiatric Certification: Based upon physical, mental and social evaluations, I certify that inpatient psychiatric services are medically necessary for this patient for a duration of greater than 2 midnights for the treatment of Schizoaffective disorder, bipolar type (HCC) including psychotropic medication management, participation in the therapeutic milieu and referrals as indicated. Available alternative community resources do not meet the patient's mental health  care needs. I further attest that an established written individualized plan of care has been implemented and is outlined in the patient's medical records.    Counseling/Coordination of Care    Total unit time spent today was greater than 15 minutes. Greater than 50% of total time was spent with the patient and/or patient's relatives and/or coordination of patient's care.     Patient's rights, confidentiality, exceptions to confidentiality, use of electronic medical record including appropriate staff access to medical record regarding behavioral health services and consent to treatment were reviewed.    Note Share:     This note was not shared with the patient due to reasonable likelihood of causing patient harm     This note has been constructed using a voice recognition system.    There may be translation, syntax, or grammatical errors. If you have any questions, please contact the dictating provider.    Jordan Christopher Holter, DO 05/23/24

## 2024-05-23 NOTE — NURSING NOTE
"Pt is accepting of medications without incidence and meal compliant. Pt is polite, pleasant and social with peers. Pt states \" I can manage with the voices\", but denies all other s/s. Pt attends groups and plays cards with peers. No new concerns.   "

## 2024-05-23 NOTE — PROGRESS NOTES
05/23/24 0732   Team Meeting   Meeting Type Daily Rounds   Team Members Present   Team Members Present Physician;Nurse;;Other (Discipline and Name)   Patient/Family Present   Patient Present No   Patient's Family Present No     In attendance:  Jessica Stives, PA-C Dr. Jordan Holter, HOLLI Weiss, RN  Luisana Alvarado, [facility]  Carla Lux, FREDERICK    Groups: 7/9    Pt's blood pressures have been more stable. Pt has next ECT 5/24. Pt had in person visit with his aunt that went well yesterday. Pt expressed feeling stressed by voices and reportedly tries to keep himself distracted. Pt reports ongoing depression and anxiety.

## 2024-05-23 NOTE — NURSING NOTE
Alberto has been awake, alert, and visible intermittently out in the milieu.  Pt ate 100% supper.  Pt social with select peers.  Pleasant, polite, and cooperative.  Affect flat, blunted. .  Pt rated his depression a #710,  anxiety a #2/4,  and continues to hear voices that tell him that they are going to kill him and his family.a/v hallucinations.  Pt spends time playing cards, listening to music on the radio, and watching tv with peers to help block the voices.  Pt showered this shift as is for ECT tomorrow AM and reminded pt that he is NPO after midnight tonight.  Pt attended and participated in evening coping skills group, not wrap up group,  but came out after and had snack with peers.  Pt cooperative with taking scheduled medications as ordered and without incident.  Pt maintained on continuous safety checks.  Will continue to monitor/assess for any changes in behavior.

## 2024-05-23 NOTE — PLAN OF CARE
Problem: Alteration in Thoughts and Perception  Goal: Treatment Goal: Gain control of psychotic behaviors/thinking, reduce/eliminate presenting symptoms and demonstrate improved reality functioning upon discharge  Outcome: Progressing  Goal: Verbalize thoughts and feelings  Description: Interventions:  - Promote a nonjudgmental and trusting relationship with the patient through active listening and therapeutic communication  - Assess patient's level of functioning, behavior and potential for risk  - Engage patient in 1 on 1 interactions  - Encourage patient to express fears, feelings, frustrations, and discuss symptoms    - [place] patient to reality, help patient recognize reality-based thinking   - Administer medications as ordered and assess for potential side effects  - Provide the patient education related to the signs and symptoms of the illness and desired effects of prescribed medications  Outcome: Progressing  Goal: Refrain from acting on delusional thinking/internal stimuli  Description: Interventions:  - Monitor patient closely, per order   - Utilize least restrictive measures   - Set reasonable limits, give positive feedback for acceptable   - Administer medications as ordered and monitor of potential side effects  Outcome: Progressing  Goal: Agree to be compliant with medication regime, as prescribed and report medication side effects  Description: Interventions:  - Offer appropriate PRN medication and supervise ingestion; conduct AIMS, as needed   Outcome: Progressing  Goal: Attend and participate in unit activities, including therapeutic, recreational, and educational groups  Description: Interventions:  -Encourage Visitation and family involvement in care  Outcome: Progressing     Problem: Ineffective Coping  Goal: Demonstrates healthy coping skills  Outcome: Progressing  Goal: Participates in unit activities  Description: Interventions:  - Provide therapeutic environment   - Provide required  programming   - Redirect inappropriate behaviors   Outcome: Progressing     Problem: Depression  Goal: Treatment Goal: Demonstrate behavioral control of depressive symptoms, verbalize feelings of improved mood/affect, and adopt new coping skills prior to discharge  Outcome: Progressing  Goal: Refrain from harming self  Description: Interventions:  - Monitor patient closely, per order   - Supervise medication ingestion, monitor effects and side effects   Outcome: Progressing  Goal: Refrain from isolation  Description: Interventions:  - Develop a trusting relationship   - Encourage socialization   Outcome: Progressing  Goal: Refrain from self-neglect  Outcome: Progressing     Problem: Anxiety  Goal: Anxiety is at manageable level  Description: Interventions:  - Assess and monitor patient's anxiety level.   - Monitor for signs and symptoms (heart palpitations, chest pain, shortness of breath, headaches, nausea, feeling jumpy, restlessness, irritable, apprehensive).   - Collaborate with interdisciplinary team and initiate plan and interventions as ordered.  - [place] patient to unit/surroundings  - Explain treatment plan  - Encourage participation in care  - Encourage verbalization of concerns/fears  - Identify coping mechanisms  - Assist in developing anxiety-reducing skills  - Administer/offer alternative therapies  - Limit or eliminate stimulants  Outcome: Progressing     Problem: Alteration in Orientation  Goal: Treatment Goal: Demonstrate a reduction of confusion and improved orientation to person, place, time and/or situation upon discharge, according to optimum baseline/ability  Outcome: Progressing     Problem: Electroconvulsive therapy (ECT)  Goal: Maintain or return mobility to safest level of function  Description: INTERVENTIONS:  - Assess patient's ability to carry out ADLs; assess patient's baseline for ADL function and identify physical deficits which impact ability to perform ADLs (bathing, care of  mouth/teeth, toileting, grooming, dressing, etc.)  - Assess/evaluate cause of self-care deficits   - Assess range of motion  - Assess patient's mobility  - Assess patient's need for assistive devices and provide as appropriate  - Encourage maximum independence but intervene and supervise when necessary  - Involve family in performance of ADLs  - Assess for home care needs following discharge   - Consider OT consult to assist with ADL evaluation and planning for discharge  - Provide patient education as appropriate  Outcome: Progressing  Goal: Maintains adequate nutritional intake  Description: INTERVENTIONS:  - Monitor percentage of each meal consumed  - Identify factors contributing to decreased intake, treat as appropriate  - Assist with meals as needed  - Monitor I&O, weight, and lab values if indicated  - Obtain nutrition services referral as needed  Outcome: Progressing     Problem: DISCHARGE PLANNING - CARE MANAGEMENT  Goal: Discharge to post-acute care or home with appropriate resources  Description: INTERVENTIONS:  - Conduct assessment to determine patient/family and health care team treatment goals, and need for post-acute services based on payer coverage, community resources, and patient preferences, and barriers to discharge  - Address psychosocial, clinical, and financial barriers to discharge as identified in assessment in conjunction with the patient/family and health care team  - Arrange appropriate level of post-acute services according to patient’s   needs and preference and payer coverage in collaboration with the physician and health care team  - Communicate with and update the patient/family, physician, and health care team regarding progress on the discharge plan  - Arrange appropriate transportation to post-acute venues  Outcome: Progressing

## 2024-05-24 ENCOUNTER — APPOINTMENT (INPATIENT)
Dept: PREOP/PACU | Facility: HOSPITAL | Age: 29
DRG: 750 | End: 2024-05-24
Attending: PSYCHIATRY & NEUROLOGY
Payer: COMMERCIAL

## 2024-05-24 ENCOUNTER — ANESTHESIA (INPATIENT)
Dept: PREOP/PACU | Facility: HOSPITAL | Age: 29
DRG: 750 | End: 2024-05-24
Payer: COMMERCIAL

## 2024-05-24 ENCOUNTER — ANESTHESIA EVENT (OUTPATIENT)
Dept: ANESTHESIOLOGY | Facility: HOSPITAL | Age: 29
End: 2024-05-24

## 2024-05-24 ENCOUNTER — ANESTHESIA (OUTPATIENT)
Dept: ANESTHESIOLOGY | Facility: HOSPITAL | Age: 29
End: 2024-05-24

## 2024-05-24 ENCOUNTER — ANESTHESIA EVENT (INPATIENT)
Dept: PREOP/PACU | Facility: HOSPITAL | Age: 29
DRG: 750 | End: 2024-05-24
Payer: COMMERCIAL

## 2024-05-24 PROCEDURE — 99232 SBSQ HOSP IP/OBS MODERATE 35: CPT | Performed by: PSYCHIATRY & NEUROLOGY

## 2024-05-24 PROCEDURE — 90870 ELECTROCONVULSIVE THERAPY: CPT

## 2024-05-24 PROCEDURE — 90870 ELECTROCONVULSIVE THERAPY: CPT | Performed by: STUDENT IN AN ORGANIZED HEALTH CARE EDUCATION/TRAINING PROGRAM

## 2024-05-24 PROCEDURE — GZB2ZZZ ELECTROCONVULSIVE THERAPY, BILATERAL-SINGLE SEIZURE: ICD-10-PCS | Performed by: STUDENT IN AN ORGANIZED HEALTH CARE EDUCATION/TRAINING PROGRAM

## 2024-05-24 RX ORDER — MIDAZOLAM HYDROCHLORIDE 2 MG/2ML
INJECTION, SOLUTION INTRAMUSCULAR; INTRAVENOUS AS NEEDED
Status: DISCONTINUED | OUTPATIENT
Start: 2024-05-24 | End: 2024-05-24

## 2024-05-24 RX ORDER — KETOROLAC TROMETHAMINE 30 MG/ML
INJECTION, SOLUTION INTRAMUSCULAR; INTRAVENOUS AS NEEDED
Status: DISCONTINUED | OUTPATIENT
Start: 2024-05-24 | End: 2024-05-24

## 2024-05-24 RX ORDER — SUCCINYLCHOLINE/SOD CL,ISO/PF 100 MG/5ML
SYRINGE (ML) INTRAVENOUS AS NEEDED
Status: DISCONTINUED | OUTPATIENT
Start: 2024-05-24 | End: 2024-05-24

## 2024-05-24 RX ORDER — ETOMIDATE 2 MG/ML
INJECTION INTRAVENOUS AS NEEDED
Status: DISCONTINUED | OUTPATIENT
Start: 2024-05-24 | End: 2024-05-24

## 2024-05-24 RX ORDER — HYDRALAZINE HYDROCHLORIDE 20 MG/ML
INJECTION INTRAMUSCULAR; INTRAVENOUS AS NEEDED
Status: DISCONTINUED | OUTPATIENT
Start: 2024-05-24 | End: 2024-05-24

## 2024-05-24 RX ORDER — LABETALOL HYDROCHLORIDE 5 MG/ML
INJECTION, SOLUTION INTRAVENOUS AS NEEDED
Status: DISCONTINUED | OUTPATIENT
Start: 2024-05-24 | End: 2024-05-24

## 2024-05-24 RX ORDER — GLYCOPYRROLATE 0.2 MG/ML
INJECTION INTRAMUSCULAR; INTRAVENOUS AS NEEDED
Status: DISCONTINUED | OUTPATIENT
Start: 2024-05-24 | End: 2024-05-24

## 2024-05-24 RX ORDER — SODIUM CHLORIDE, SODIUM LACTATE, POTASSIUM CHLORIDE, CALCIUM CHLORIDE 600; 310; 30; 20 MG/100ML; MG/100ML; MG/100ML; MG/100ML
INJECTION, SOLUTION INTRAVENOUS CONTINUOUS PRN
Status: DISCONTINUED | OUTPATIENT
Start: 2024-05-24 | End: 2024-05-24

## 2024-05-24 RX ORDER — ESMOLOL HYDROCHLORIDE 10 MG/ML
INJECTION INTRAVENOUS AS NEEDED
Status: DISCONTINUED | OUTPATIENT
Start: 2024-05-24 | End: 2024-05-24

## 2024-05-24 RX ADMIN — PROPRANOLOL HYDROCHLORIDE 10 MG: 10 TABLET ORAL at 21:20

## 2024-05-24 RX ADMIN — POLYETHYLENE GLYCOL 3350 17 G: 17 POWDER, FOR SOLUTION ORAL at 09:05

## 2024-05-24 RX ADMIN — MELATONIN TAB 3 MG 3 MG: 3 TAB at 21:19

## 2024-05-24 RX ADMIN — SENNOSIDES AND DOCUSATE SODIUM 2 TABLET: 8.6; 5 TABLET ORAL at 17:14

## 2024-05-24 RX ADMIN — CLOZAPINE 500 MG: 100 TABLET ORAL at 21:19

## 2024-05-24 RX ADMIN — CYANOCOBALAMIN TAB 1000 MCG 1000 MCG: 1000 TAB at 09:07

## 2024-05-24 RX ADMIN — NICOTINE POLACRILEX 2 MG: 2 GUM, CHEWING ORAL at 17:30

## 2024-05-24 RX ADMIN — SODIUM CHLORIDE, SODIUM LACTATE, POTASSIUM CHLORIDE, AND CALCIUM CHLORIDE: .6; .31; .03; .02 INJECTION, SOLUTION INTRAVENOUS at 06:18

## 2024-05-24 RX ADMIN — Medication 140 MG: at 06:58

## 2024-05-24 RX ADMIN — RISPERIDONE 2 MG: 2 TABLET, FILM COATED ORAL at 21:19

## 2024-05-24 RX ADMIN — AMLODIPINE BESYLATE 5 MG: 5 TABLET ORAL at 05:21

## 2024-05-24 RX ADMIN — Medication 50 MG: at 06:58

## 2024-05-24 RX ADMIN — LABETALOL HYDROCHLORIDE 20 MG: 5 INJECTION, SOLUTION INTRAVENOUS at 06:58

## 2024-05-24 RX ADMIN — ESCITALOPRAM OXALATE 20 MG: 10 TABLET, FILM COATED ORAL at 09:07

## 2024-05-24 RX ADMIN — Medication 40 MG: at 07:02

## 2024-05-24 RX ADMIN — GLYCOPYRROLATE 0.4 MG: 0.2 INJECTION, SOLUTION INTRAMUSCULAR; INTRAVENOUS at 06:49

## 2024-05-24 RX ADMIN — HYDRALAZINE HYDROCHLORIDE 10 MG: 20 INJECTION INTRAMUSCULAR; INTRAVENOUS at 06:55

## 2024-05-24 RX ADMIN — HYDRALAZINE HYDROCHLORIDE 10 MG: 20 INJECTION INTRAMUSCULAR; INTRAVENOUS at 06:57

## 2024-05-24 RX ADMIN — PROPRANOLOL HYDROCHLORIDE 10 MG: 10 TABLET ORAL at 05:22

## 2024-05-24 RX ADMIN — MIDAZOLAM 2 MG: 1 INJECTION INTRAMUSCULAR; INTRAVENOUS at 07:07

## 2024-05-24 RX ADMIN — KETOROLAC TROMETHAMINE 30 MG: 30 INJECTION, SOLUTION INTRAMUSCULAR; INTRAVENOUS at 06:49

## 2024-05-24 RX ADMIN — METFORMIN HYDROCHLORIDE 500 MG: 500 TABLET ORAL at 09:07

## 2024-05-24 RX ADMIN — SENNOSIDES AND DOCUSATE SODIUM 2 TABLET: 8.6; 5 TABLET ORAL at 09:07

## 2024-05-24 RX ADMIN — NICOTINE POLACRILEX 2 MG: 2 GUM, CHEWING ORAL at 12:46

## 2024-05-24 RX ADMIN — NICOTINE POLACRILEX 2 MG: 2 GUM, CHEWING ORAL at 21:20

## 2024-05-24 RX ADMIN — ETOMIDATE 20 MG: 20 INJECTION, SOLUTION INTRAVENOUS at 06:58

## 2024-05-24 NOTE — PLAN OF CARE
Problem: Alteration in Thoughts and Perception  Goal: Treatment Goal: Gain control of psychotic behaviors/thinking, reduce/eliminate presenting symptoms and demonstrate improved reality functioning upon discharge  Outcome: Progressing  Goal: Verbalize thoughts and feelings  Description: Interventions:  - Promote a nonjudgmental and trusting relationship with the patient through active listening and therapeutic communication  - Assess patient's level of functioning, behavior and potential for risk  - Engage patient in 1 on 1 interactions  - Encourage patient to express fears, feelings, frustrations, and discuss symptoms    - [place] patient to reality, help patient recognize reality-based thinking   - Administer medications as ordered and assess for potential side effects  - Provide the patient education related to the signs and symptoms of the illness and desired effects of prescribed medications  Outcome: Progressing  Goal: Refrain from acting on delusional thinking/internal stimuli  Description: Interventions:  - Monitor patient closely, per order   - Utilize least restrictive measures   - Set reasonable limits, give positive feedback for acceptable   - Administer medications as ordered and monitor of potential side effects  Outcome: Progressing  Goal: Agree to be compliant with medication regime, as prescribed and report medication side effects  Description: Interventions:  - Offer appropriate PRN medication and supervise ingestion; conduct AIMS, as needed   Outcome: Progressing  Goal: Attend and participate in unit activities, including therapeutic, recreational, and educational groups  Description: Interventions:  -Encourage Visitation and family involvement in care  Outcome: Progressing  Goal: Recognize dysfunctional thoughts, communicate reality-based thoughts at the time of discharge  Description: Interventions:  - Provide medication and psycho-education to assist patient in compliance and developing  insight into his/her illness   Outcome: Progressing  Goal: Complete daily ADLs, including personal hygiene independently, as able  Description: Interventions:  - Observe, teach, and assist patient with ADLS  - Monitor and promote a balance of rest/activity, with adequate nutrition and elimination   Outcome: Progressing     Problem: Ineffective Coping  Goal: Demonstrates healthy coping skills  Outcome: Progressing  Goal: Participates in unit activities  Description: Interventions:  - Provide therapeutic environment   - Provide required programming   - Redirect inappropriate behaviors   Outcome: Progressing     Problem: Depression  Goal: Treatment Goal: Demonstrate behavioral control of depressive symptoms, verbalize feelings of improved mood/affect, and adopt new coping skills prior to discharge  Outcome: Progressing  Goal: Refrain from harming self  Description: Interventions:  - Monitor patient closely, per order   - Supervise medication ingestion, monitor effects and side effects   Outcome: Progressing  Goal: Refrain from isolation  Description: Interventions:  - Develop a trusting relationship   - Encourage socialization   Outcome: Progressing  Goal: Refrain from self-neglect  Outcome: Progressing  Goal: Attend and participate in unit activities, including therapeutic, recreational, and educational groups  Description: Interventions:  - Provide therapeutic and educational activities daily, encourage attendance and participation, and document same in the medical record   Outcome: Progressing  Goal: Complete daily ADLs, including personal hygiene independently, as able  Description: Interventions:  - Observe, teach, and assist patient with ADLS  -  Monitor and promote a balance of rest/activity, with adequate nutrition and elimination   Outcome: Progressing     Problem: Anxiety  Goal: Anxiety is at manageable level  Description: Interventions:  - Assess and monitor patient's anxiety level.   - Monitor for signs and  symptoms (heart palpitations, chest pain, shortness of breath, headaches, nausea, feeling jumpy, restlessness, irritable, apprehensive).   - Collaborate with interdisciplinary team and initiate plan and interventions as ordered.  - [place] patient to unit/surroundings  - Explain treatment plan  - Encourage participation in care  - Encourage verbalization of concerns/fears  - Identify coping mechanisms  - Assist in developing anxiety-reducing skills  - Administer/offer alternative therapies  - Limit or eliminate stimulants  Outcome: Progressing     Problem: Alteration in Orientation  Goal: Treatment Goal: Demonstrate a reduction of confusion and improved orientation to person, place, time and/or situation upon discharge, according to optimum baseline/ability  Outcome: Progressing  Goal: Interact with staff daily  Description: Interventions:  - Assess and re-assess patient's level of orientation  - Engage patient in 1 on 1 interactions, daily, for a minimum of 15 minutes   - Establish rapport/trust with patient   Outcome: Progressing  Goal: Cooperate with recommended testing/procedures  Description: Interventions:  - Determine need for ancillary testing  - Observe for mental status changes  - Implement falls/precaution protocol   Outcome: Progressing  Goal: Attend and participate in unit activities, including therapeutic, recreational, and educational groups  Description: Interventions:  - Provide therapeutic and educational activities daily, encourage attendance and participation, and document same in the medical record   - Provide appropriate opportunities for reminiscence   - Provide a consistent daily routine   - Encourage family contact/visitation   Outcome: Progressing  Goal: Complete daily ADLs, including personal hygiene independently, as able  Description: Interventions:  - Observe, teach, and assist patient with ADLS  Outcome: Progressing

## 2024-05-24 NOTE — ANESTHESIA PREPROCEDURE EVALUATION
Procedure:  PRE-OP ONLY    Relevant Problems   CARDIO   (+) Primary hypertension      GI/HEPATIC   (+) GERD (gastroesophageal reflux disease)        Physical Exam    Airway    Mallampati score: II  TM Distance: >3 FB  Neck ROM: full     Dental       Cardiovascular  Cardiovascular exam normal    Pulmonary  Pulmonary exam normal     Other Findings        Anesthesia Plan  ASA Score- 2     Anesthesia Type- general with ASA Monitors.         Additional Monitors:     Airway Plan:            Plan Factors-Exercise tolerance (METS): >4 METS.    Chart reviewed. EKG reviewed.  Existing labs reviewed. Patient summary reviewed.                  Induction- intravenous.    Postoperative Plan-     Perioperative Resuscitation Plan - Level 1 - Full Code.       Informed Consent- Anesthetic plan and risks discussed with patient.  I personally reviewed this patient with the CRNA. Discussed and agreed on the Anesthesia Plan with the CRNA..      Lab Results   Component Value Date    HGBA1C 5.0 04/01/2024       Lab Results   Component Value Date    K 4.1 04/01/2024     04/01/2024    CO2 27 04/01/2024    BUN 12 04/01/2024    CREATININE 0.90 04/01/2024    GLUF 97 01/11/2024    CALCIUM 9.3 04/01/2024    AST 23 04/01/2024    ALT 53 (H) 04/01/2024    ALKPHOS 84 04/01/2024    EGFR 116 04/01/2024       Lab Results   Component Value Date    WBC 8.88 05/20/2024    HGB 12.4 05/20/2024    HCT 40.7 05/20/2024    MCV 80 (L) 05/20/2024     05/20/2024     Sinus tachycardia  Nonspecific T wave abnormality  Abnormal ECG  When compared with ECG of 11-MAR-2024 15:41, (unconfirmed)  Sinus rhythm has replaced Ectopic atrial rhythm  QRS axis Shifted left  Confirmed by Angel Lara ([id number]) on 3/11/2024 5:11:48 PM      Specimen Collected: 03/11/24 15:42

## 2024-05-24 NOTE — ANESTHESIA PREPROCEDURE EVALUATION
Procedure:  ELECTROCONVULSIVE THERAPY (ECT)    Relevant Problems   CARDIO   (+) Primary hypertension      GI/HEPATIC   (+) GERD (gastroesophageal reflux disease)        Physical Exam    Airway    Mallampati score: II  TM Distance: >3 FB  Neck ROM: full     Dental       Cardiovascular  Cardiovascular exam normal    Pulmonary  Pulmonary exam normal     Other Findings        Anesthesia Plan  ASA Score- 2     Anesthesia Type- general with ASA Monitors.         Additional Monitors:     Airway Plan:            Plan Factors-Exercise tolerance (METS): >4 METS.    Chart reviewed. EKG reviewed.  Existing labs reviewed. Patient summary reviewed.                  Induction- intravenous.    Postoperative Plan-     Perioperative Resuscitation Plan - Level 1 - Full Code.       Informed Consent- Anesthetic plan and risks discussed with patient.  I personally reviewed this patient with the CRNA. Discussed and agreed on the Anesthesia Plan with the CRNA..      Lab Results   Component Value Date    HGBA1C 5.0 04/01/2024       Lab Results   Component Value Date    K 4.1 04/01/2024     04/01/2024    CO2 27 04/01/2024    BUN 12 04/01/2024    CREATININE 0.90 04/01/2024    GLUF 97 01/11/2024    CALCIUM 9.3 04/01/2024    AST 23 04/01/2024    ALT 53 (H) 04/01/2024    ALKPHOS 84 04/01/2024    EGFR 116 04/01/2024       Lab Results   Component Value Date    WBC 8.88 05/20/2024    HGB 12.4 05/20/2024    HCT 40.7 05/20/2024    MCV 80 (L) 05/20/2024     05/20/2024     Sinus tachycardia  Nonspecific T wave abnormality  Abnormal ECG  When compared with ECG of 11-MAR-2024 15:41, (unconfirmed)  Sinus rhythm has replaced Ectopic atrial rhythm  QRS axis Shifted left  Confirmed by Angel Lara ([id number]) on 3/11/2024 5:11:48 PM      Specimen Collected: 03/11/24 15:42

## 2024-05-24 NOTE — NURSING NOTE
"Pt is accepting of medications without incidence and meal compliant. Pt is visible in the milieu for meals, but otherwise rests in bed. Pt napping stating \"I'm just so tired from ECT\". Pt reports AH and manageable and \" the same\". Pt denies all other s/s. Offers no new concerns. Follow up message placed to medical regarding EKG results from earlier in week.   "

## 2024-05-24 NOTE — NURSING NOTE
Received pt in bed at change start of shift 2300.  Appears to sleep thus this far as per q 7 min safety checks.  Maintained NPO for ECT this am.      Transported to PACU at 0605 via WC by our T.

## 2024-05-24 NOTE — PROGRESS NOTES
05/24/24 0726   Team Meeting   Meeting Type Daily Rounds   Team Members Present   Team Members Present Physician;Nurse;;Other (Discipline and Name)   Patient/Family Present   Patient Present No   Patient's Family Present No     In attendance:  Dr. Alex Thomas, MD Dr. Jordan Holter, HOLLI Weiss, RN  Luisana Alvarado, [facility]  Carla Lux, [facility] [place]    Groups: 5/8    Pt will continue maintenance ECTs. Pt was approved for 6 additional ECT sessions. Pt is appropriate, social and engaged. Pt reports ongoing voices.

## 2024-05-24 NOTE — PROCEDURES
"Procedure Note - ECT  Alberto Berumen 28 y.o. male MRN: [id number]    Time out was taken with staff to confirm correct patient and correct procedure to be performed.  Patient reports that he is doing well with treatments.  Feels as though they are helping with his symptoms.  Still continues to experience auditory hallucinations although overall states that he feels \"less in my head\".  Denies any SI, HI.  In agreement to continue his treatments.  States no significant side effects other than some short-term memory issues that occur 1 to 2 days after the procedure and then improved.    Session Number: Maintenance    Diagnosis: Principal Problem:    Schizoaffective disorder, bipolar type ([facility])  Active Problems:    GERD (gastroesophageal reflux disease)    Medical clearance for psychiatric admission    Tobacco abuse    T wave inversion in EKG    Chronic idiopathic constipation    Confluent and reticulate papillomatosis    Primary hypertension    Elevated hemoglobin A1c      ECT Type: Inpatient, Maintenance    Anesthesia: Etomidate :    Electrode Placement: Bilateral    Energy level:  100 %      Seizure Duration     EE Sec.    EMG : 31 Sec    Post-ictal Suppression Index: n/a %    Results:Clinical seizure was satisfactory, Patient tolerated ECT well; ECT machine did malfunction prior to initiation of procedure; ECT machine was promptly substituted for another machine which allowed for adequate seizure and response today.    Vitals:    24 0725   BP: 147/64   Pulse: 105   Resp: 15   Temp:    SpO2: 95%        Medication Administration - last 24 hours from 2024 0730 to 2024 0730         Date/Time Order Dose Route Action Action by     2024 0521 EDT amLODIPine (NORVASC) tablet 5 mg 5 mg Oral Given Mireya Pearson RN     2024 0907 EDT amLODIPine (NORVASC) tablet 5 mg 5 mg Oral Given Arin Travis RN     2024 2112 EDT atropine (ISOPTO ATROPINE) 1 % ophthalmic solution 1 drop 1 drop " Sublingual Not Given Paolo Smalls RN     05/23/2024 0907 EDT escitalopram (LEXAPRO) tablet 20 mg 20 mg Oral Given Arin Travis RN     05/23/2024 0907 EDT metFORMIN (GLUCOPHAGE) tablet 500 mg 500 mg Oral Given Arin Travis RN     05/23/2024 0907 EDT polyethylene glycol (MIRALAX) packet 17 g 17 g Oral Given Arin Travis RN     05/24/2024 0522 EDT propranolol (INDERAL) tablet 10 mg 10 mg Oral Given Mireya Pearson RN     05/23/2024 2108 EDT propranolol (INDERAL) tablet 10 mg 10 mg Oral Given Paolo Smalls RN     05/23/2024 0907 EDT propranolol (INDERAL) tablet 10 mg 10 mg Oral Given Arin Travis RN     05/23/2024 1711 EDT senna-docusate sodium (SENOKOT S) 8.6-50 mg per tablet 2 tablet 2 tablet Oral Given Paolo Smalls RN     05/23/2024 0907 EDT senna-docusate sodium (SENOKOT S) 8.6-50 mg per tablet 2 tablet 2 tablet Oral Given Arin Travis RN     05/23/2024 2109 EDT melatonin tablet 3 mg 3 mg Oral Given Paolo Smalls RN     05/23/2024 0907 EDT cyanocobalamin (VITAMIN B-12) tablet 1,000 mcg 1,000 mcg Oral Given Arin Travis RN     05/23/2024 2109 EDT risperiDONE (RisperDAL) tablet 2 mg 2 mg Oral Given Paolo Smalls RN     05/23/2024 2109 EDT cloZAPine (CLOZARIL) tablet 500 mg 500 mg Oral Given Paolo Smalls RN     05/23/2024 1502 EDT nicotine polacrilex (NICORETTE) gum 2 mg 2 mg Oral Given Arin Travis RN     05/23/2024 0907 EDT nicotine polacrilex (NICORETTE) gum 2 mg 2 mg Oral Given Arin Travis RN

## 2024-05-24 NOTE — ANESTHESIA POSTPROCEDURE EVALUATION
Post-Op Assessment Note    CV Status:  Stable  Pain Score: 0    Pain management: adequate       Mental Status:  Alert   Hydration Status:  Stable   PONV Controlled:  Controlled   Airway Patency:  Patent     Post Op Vitals Reviewed: Yes    No anethesia notable event occurred.    Staff: CRNA               BP   163/91   Temp      Pulse  105   Resp   20   SpO2   97

## 2024-05-24 NOTE — PROGRESS NOTES
Psychiatry Progress Note [facility]    Alberto Berumen 28 y.o. male MRN: [id number]  Unit/Bed#: [facility] 101-02 Encounter: [id number]  Code Status: Level 1 - Full Code    PCP: Joce Juan MD    Date of Admission:  3/29/2024 2008   Date of Service:  05/24/24    Patient Active Problem List   Diagnosis    GERD (gastroesophageal reflux disease)    Medical clearance for psychiatric admission    Schizoaffective disorder, bipolar type (HCC)    Tobacco abuse    T wave inversion in EKG    Syringoma    Chronic idiopathic constipation    Vitamin B 12 deficiency    Vitamin D deficiency    Confluent and reticulate papillomatosis    Class 2 obesity in adult    Primary hypertension    Elevated hemoglobin A1c     Review of systems: Unremarkable   diagnosis: Schizoaffective bipolar  assessment  Overall Status: Patient had his maintenance weekly ECT today which he claims is definitely subsided the voices so that he is not too much in his head according to him.  He says he can manage them but still appears anxious worried preoccupied internally attuned staying to himself most of the time with minimal interaction with peers and staff   Certification Statement: The patient will continue to require additional inpatient hospital stay due to ongoing voices that are threatening to kill him and his family lack of response to medications and ECT so far.     Medications: Clozapine 500 mg at bedtime, propranolol 10 mg every 12 hours as as needed for anxiety, Risperdal 2 mg at bedtime Lexapro 20 mg once a day, atropine eyedrops sublingual for drooling of saliva and senna 2 tablets twice a day for constipation  All medications reviewed and recommend they be continued for symptom management  side effects from treatment: None reported  Medication changes   None today   medication education   Risks side effects benefits and precautions of medications discussed with patient and he did verbalize an understanding about risks for  metabolic syndrome from being on neuroleptics and is form tardive dyskinesia etc. and special precautions about being on clozapine   Understanding of medications: Has some understanding   Justification for dual anti-psychotics: Not applicable    Non-pharmacological treatments  Continue with individual, group, milieu and occupational therapy using recovery principles and psycho-education about accepting illness and the need for treatment.   to contact aunt and explore ACT team referral which he never had  ECT to be continued  weekly for maintenance starting on 4/12/2024, plan is to do 5 more q 2 weekly ECTs as he insists it is helpful so far  Refuses to see a dietician and agrees to do more exercises as he is about 100 lbs overweight     Safety  Safety and communication plan established to target dynamic risk factors discussed above.    Discharge Plan   Back to his aunt with an ACT team    Interval Progress   Remains isolated withdrawn staying to himself and tolerated weekly ECT so far which he claims have been helping him keep the voices under check so that he is not too much in his head according to him.  He is still believes ECTs have definitely helped and wants more.  Not aggressive or agitated or threatening or self-abusive and remains polite friendly pleasant when approached.  Over staying to himself most of the time with superficial interactions with staff and peers with a flat to constricted affect.  Continues to hear the same voices that are threatening to kill him and his family which has not changed other than that he can manage them now according to him.  I Acceptance by patient: Accepting  Hopefulness in recovery: Living with his aunt  Involved in reintegration process: Talking to his aunt and sister  Trusting in relationship with psychiatrist: Trusting  Sleep: Good  Appetite: Good  Compliance with Medications: Good  Group attendance: some groups 5/8  Significant events: Hearing voices  threatening to kill him and his family but reports ECTs have helped him decrease the intensity of the voices    Mental Status Exam  Appearance: age appropriate, improved grooming, looks older than stated age, overweight, with hair in dreadlocks casually dressed with a clean shaven face, fairly groomed with good eye contact  again laying on bed under the covers easy to be aroused  behavior: cooperative, mildly anxious, evasive, gesturing, slow responses.   speech: normal rate, normal volume, normal pitch  Mood: dysphoric, anxious, reports he feels good   affect: constricted, inappropriate, mood-congruent  Thought Process: organized, logical, coherent, goal directed, linear, decreased rate of thoughts, slowing of thoughts, negative thinking, impaired abstract reasoning, concrete  Thought Content: paranoid ideation, some paranoia, grandiose ideas, intrusive thoughts, preoccupied, chronic, continues to report paranoia about people threatening to kill him and his family because of the voices.  He believes ECT has helped so that he is not much in his head.  No other delusions elicited.  No current suicidal or homicidal thoughts and no plans verbalized.  No phobias obsessions compulsions or distorted body perceptions reported.  Preoccupied with wanting to get ECTs more often despite reminding him that it may impair his memory  Perceptual Disturbances: still with same threatening voices, not commanding   Hx Risk Factors: chronic psychiatric problems, chronic anxiety symptoms, chronic psychotic symptoms,    Sensorium: oriented x 3 spheres and situation  Cognition: recent and remote memory grossly intact  Consciousness: alert and awake  Attention: attention span and concentration are age appropriate  Intellect: appears to be of average intelligence  Insight: intact  Judgement: intact  Motor Activity: no abnormal movements     Vitals  Temp:  [97.6 °F (36.4 °C)-98.3 °F (36.8 °C)] 97.6 °F (36.4 °C)  HR:  [] 93  Resp:   [12-20] 18  BP: (127-159)/() 127/62  SpO2:  [95 %-100 %] 96 %    Intake/Output Summary (Last 24 hours) at 5/24/2024 0826  Last data filed at 5/23/2024 1721  Gross per 24 hour   Intake 0 ml   Output --   Net 0 ml               Lab Results: All Labs For Current Hospital Admission Reviewed     Current Facility-Administered Medications   Medication Dose Route Frequency Provider Last Rate    acetaminophen  650 mg Oral Q4H PRN Jordan C Holter, DO      acetaminophen  650 mg Oral Q6H PRN HOLLI Lion      aluminum-magnesium hydroxide-simethicone  30 mL Oral Q4H PRN Jordan C Holter, DO      amLODIPine  5 mg Oral Daily HOLLI Lion      Artificial Tears  1 drop Both Eyes Q3H PRN Jordan C Holter, DO      atropine  1 drop Sublingual HS HOLLI Lion      atropine  1 drop Sublingual Daily PRN HOLLI Lion      haloperidol lactate  2.5 mg Intramuscular Q4H PRN Max 4/day STEVE LionNP      And    LORazepam  1 mg Intramuscular Q4H PRN Max 4/day STEVE LionNP      And    benztropine  0.5 mg Intramuscular Q4H PRN Max 4/day HOLLI Lion      benztropine  1 mg Intramuscular Q4H PRN Max 6/day Jordan C Holter, DO      haloperidol lactate  5 mg Intramuscular Q4H PRN Max 4/day STEVE LionNP      And    LORazepam  2 mg Intramuscular Q4H PRN Max 4/day HOLLI Lion      And    benztropine  1 mg Intramuscular Q4H PRN Max 4/day HOLLI Lion      benztropine  1 mg Oral Q4H PRN Max 6/day HOLLI Lion      benztropine  1 mg Oral Q4H PRN Max 6/day Jordan C Holter, DO      bisacodyl  10 mg Rectal Daily PRN HOLLI Lion      cloZAPine  500 mg Oral HS Bora Rosario MD      cyanocobalamin  1,000 mcg Oral Daily HOLLI Galvan      hydrOXYzine HCL  50 mg Oral Q6H PRN Max 4/day Jordan C Holter,       Or    diphenhydrAMINE  50 mg Intramuscular Q6H PRN Jordan C Holter, DO      hydrOXYzine HCL  50 mg Oral Q6H PRN Max 4/day HOLLI Lion      Or    diphenhydrAMINE  50  mg Intramuscular Q6H PRN HOLLI Lion      diphenhydrAMINE-zinc acetate   Topical BID PRN HOLLI Lion      escitalopram  20 mg Oral Daily HOLLI Lion      haloperidol  1 mg Oral Q6H PRN HOLLI Lion      haloperidol  2.5 mg Oral Q4H PRN Max 4/day HOLLI Lion      haloperidol  5 mg Oral Q4H PRN Max 4/day HOLLI Lion      hydrocortisone   Topical 4x Daily PRN HOLLI Lion      hydrOXYzine HCL  100 mg Oral Q6H PRN Max 4/day [facility] Holter, DO      Or    LORazepam  2 mg Intramuscular Q6H PRN [facility] Holter, DO      hydrOXYzine HCL  100 mg Oral Q6H PRN Max 4/day HOLLI Lion      Or    LORazepam  2 mg Intramuscular Q6H PRN HOLLI Lion      hydrOXYzine HCL  25 mg Oral Q6H PRN Max 4/day [facility] Holter, DO      ibuprofen  600 mg Oral Q8H PRN HOLLI Lion      melatonin  3 mg Oral HS [facility] Holter, DO      metFORMIN  500 mg Oral Daily With Breakfast HOLLI Lion      methocarbamol  500 mg Oral Q6H PRN HOLLI Lion      nicotine polacrilex  2 mg Oral Q4H PRN Bora Rosario MD      OLANZapine  5 mg Oral Q4H PRN Max 3/day [facility] Holter, DO      Or    OLANZapine  2.5 mg Intramuscular Q4H PRN Max 3/day [facility] Holter, DO      OLANZapine  5 mg Oral Q3H PRN Max 3/day [facility] Holter, DO      Or    OLANZapine  5 mg Intramuscular Q3H PRN Max 3/day [facility] Holter, DO      OLANZapine  2.5 mg Oral Q4H PRN Max 6/day [facility] Holter, DO      ondansetron  4 mg Oral Q6H PRN HOLLI Lion      polyethylene glycol  17 g Oral Daily HOLLI Lion      polyethylene glycol  17 g Oral Daily PRN [facility] Holter, DO      propranolol  10 mg Oral Q12H KAYLIE HOLLI Lion      risperiDONE  2 mg Oral HS Bora Rosario MD      senna-docusate sodium  1 tablet Oral Daily PRN [facility] Holter, DO      senna-docusate sodium  2 tablet Oral BID HOLLI Lion      traZODone  50 mg Oral HS PRN HOLLI Lion      white petrolatum-mineral oil   Topical TID  HOLLI Clark         Counseling / Coordination of Care: Total floor / unit time spent today 15 minutes. Greater than 50% of total time was spent with the patient and / or family counseling and / or somewhat receptive to supportive listening and teaching positive coping skills to deal with symptom mangement.     Patient's Rights, confidentiality and exceptions to confidentiality, use of automated medical record, Behavioral Health Services staff access to medical record, and consent to treatment reviewed.    This note has been dictated and hence there may be problems with punctuation, spelling and formatting and if anyone has any concerns please address them to Dr. Rosario   This note is not shared with patient due to potential for making patient's condition worse by knowing the content of the note.

## 2024-05-24 NOTE — SOCIAL WORK
Pt and SW met 1:1  Pt requested to have access to his cell phone to pull numbers off.   Pt requested 2 virtual visits, including one to the same email used by another patient. SW inquired about pt's relationship with this person and he indicated he's been friends with him for over a year.

## 2024-05-25 PROCEDURE — 99232 SBSQ HOSP IP/OBS MODERATE 35: CPT | Performed by: PSYCHIATRY & NEUROLOGY

## 2024-05-25 RX ADMIN — AMLODIPINE BESYLATE 5 MG: 5 TABLET ORAL at 08:36

## 2024-05-25 RX ADMIN — POLYETHYLENE GLYCOL 3350 17 G: 17 POWDER, FOR SOLUTION ORAL at 08:34

## 2024-05-25 RX ADMIN — RISPERIDONE 2 MG: 2 TABLET, FILM COATED ORAL at 21:31

## 2024-05-25 RX ADMIN — SENNOSIDES AND DOCUSATE SODIUM 2 TABLET: 8.6; 5 TABLET ORAL at 08:33

## 2024-05-25 RX ADMIN — NICOTINE POLACRILEX 2 MG: 2 GUM, CHEWING ORAL at 08:33

## 2024-05-25 RX ADMIN — PROPRANOLOL HYDROCHLORIDE 10 MG: 10 TABLET ORAL at 08:36

## 2024-05-25 RX ADMIN — METFORMIN HYDROCHLORIDE 500 MG: 500 TABLET ORAL at 08:33

## 2024-05-25 RX ADMIN — CYANOCOBALAMIN TAB 1000 MCG 1000 MCG: 1000 TAB at 08:33

## 2024-05-25 RX ADMIN — ESCITALOPRAM OXALATE 20 MG: 10 TABLET, FILM COATED ORAL at 08:33

## 2024-05-25 RX ADMIN — NICOTINE POLACRILEX 2 MG: 2 GUM, CHEWING ORAL at 21:35

## 2024-05-25 RX ADMIN — CLOZAPINE 500 MG: 100 TABLET ORAL at 21:31

## 2024-05-25 RX ADMIN — MELATONIN TAB 3 MG 3 MG: 3 TAB at 21:31

## 2024-05-25 RX ADMIN — NICOTINE POLACRILEX 2 MG: 2 GUM, CHEWING ORAL at 17:21

## 2024-05-25 RX ADMIN — PROPRANOLOL HYDROCHLORIDE 10 MG: 10 TABLET ORAL at 21:31

## 2024-05-25 RX ADMIN — NICOTINE POLACRILEX 2 MG: 2 GUM, CHEWING ORAL at 12:33

## 2024-05-25 RX ADMIN — SENNOSIDES AND DOCUSATE SODIUM 2 TABLET: 8.6; 5 TABLET ORAL at 17:20

## 2024-05-25 NOTE — PROGRESS NOTES
Psychiatry Progress Note [facility]    Alberto Berumen 28 y.o. male MRN: [id number]  Unit/Bed#: [facility] 101-02 Encounter: [id number]  Code Status: Level 1 - Full Code    PCP: Joce Juan MD    Date of Admission:  3/29/2024 2008   Date of Service:  05/25/24    Patient Active Problem List   Diagnosis    GERD (gastroesophageal reflux disease)    Medical clearance for psychiatric admission    Schizoaffective disorder, bipolar type (HCC)    Tobacco abuse    T wave inversion in EKG    Syringoma    Chronic idiopathic constipation    Vitamin B 12 deficiency    Vitamin D deficiency    Confluent and reticulate papillomatosis    Class 2 obesity in adult    Primary hypertension    Elevated hemoglobin A1c     Review of systems: Unremarkable   diagnosis: Schizoaffective bipolar  assessment  Overall Status: Patient is appreciative of the ECTs which he claims is helpful in decreasing the way in which he responds to threatening voices which are not commanding at all but annoying so he tries to distract himself by watching TV or do other things.  Not aggressive or agitated or threatening and is still asking for more ECTs   certification Statement: The patient will continue to require additional inpatient hospital stay due to ongoing voices that are threatening to kill him and his family lack of response to medications and ECT so far.     Medications: Clozapine 500 mg at bedtime, propranolol 10 mg every 12 hours as as needed for anxiety, Risperdal 2 mg at bedtime Lexapro 20 mg once a day, atropine eyedrops sublingual for drooling of saliva and senna 2 tablets twice a day for constipation  All medications reviewed and recommend they be continued for symptom management   side effects from treatment: None reported  Medication changes   None today   medication education   Risks side effects benefits and precautions of medications discussed with patient and he did verbalize an understanding about risks for metabolic  syndrome from being on neuroleptics and is form tardive dyskinesia etc. and special precautions about being on clozapine   Understanding of medications: Has some understanding   Justification for dual anti-psychotics: Not applicable    Non-pharmacological treatments  Continue with individual, group, milieu and occupational therapy using recovery principles and psycho-education about accepting illness and the need for treatment.   to contact aunt and explore ACT team referral which he never had  ECT to be continued  weekly for maintenance starting on 4/12/2024 which ended as of yesterday, plan is to do 5 more q 2 weekly ECTs as he insists it is helpful so far  Refuses to see a dietician and agrees to do more exercises as he is about 100 lbs overweight     Safety  Safety and communication plan established to target dynamic risk factors discussed above.    Discharge Plan   Back to his aunt with an ACT team    Interval Progress   Note significant changes still remains withdrawn and isolated with a flat and constricted affect interacting with select peers.  Continues to insist ECTs have definitely helped him to stay out of his head because of the voices that are still threatening to kill him and his family.  Willing to consider q. 2 weekly ECTs may be for another 1 or 2 months and see how that works as he believes it is beneficial and helpful.  Not aggressive or agitated or threatening or self-abusive and has been polite friendly pleasant when approached.  Staying to himself interacting superficially with select peers with a flat of constricted affect.  Voices have not been commanding at all. e is usually found laying on bed under the covers  .  I Acceptance by patient: Accepting  Hopefulness in recovery: Living back with his aunt  Involved in reintegration process: Talking to his aunt and sister  Trusting in relationship with psychiatrist: Trusting  Sleep: Good  Appetite: Good  Compliance with Medications:  Good  Group attendance: some of the groups  Significant events: Improving claiming voices are less because of ECTs and asking for more    Mental Status Exam  Appearance: age appropriate, improved grooming, looks older than stated age, overweight, with hair in dreadlocks casually dressed with a clean shaven face, fairly groomed with good eye contact laying on bed but easily aroused   behavior: cooperative, mildly anxious, evasive, gesturing, slow responses.   speech: normal rate, normal volume, normal pitch  Mood: dysphoric, anxious, continues to report feeling good with the ECT   affect: constricted, inappropriate, mood-congruent  Thought Process: organized, logical, coherent, goal directed, linear, decreased rate of thoughts, slowing of thoughts, negative thinking, impaired abstract reasoning, concrete  Thought Content: paranoid ideation, some paranoia, grandiose ideas, intrusive thoughts, preoccupied, chronic, continues to report paranoia about people threatening to kill him and his family because of the voices.  He believes ECT has helped so that he is not much in his head.  No other delusions elicited.  No current suicidal or homicidal thoughts and no plans verbalized.  No phobias obsessions compulsions or distorted body perceptions reported.  Preoccupied with wanting to get ECTs more often despite reminding him that it may impair his memory and finally he agreed to such as it is to every 2 weeks  Perceptual Disturbances: Continues to report voices that are threatening to kill him and his family and not threatening and does not appear to respond  Hx Risk Factors: chronic psychiatric problems, chronic anxiety symptoms, chronic psychotic symptoms,    Sensorium: Oriented x 3 spheres and situation  Cognition: recent and remote memory grossly intact  Consciousness: alert and awake  Attention: attention span and concentration are age appropriate  Intellect: appears to be of average intelligence  Insight:  intact  Judgement: intact  Motor Activity: no abnormal movements     Vitals  Temp:  [97.9 °F (36.6 °C)] 97.9 °F (36.6 °C)  HR:  [] 94  Resp:  [18] 18  BP: (127-143)/(73-91) 143/91  No intake or output data in the 24 hours ending 05/25/24 0956              Lab Results: All Labs For Current Hospital Admission Reviewed     Current Facility-Administered Medications   Medication Dose Route Frequency Provider Last Rate    acetaminophen  650 mg Oral Q4H PRN Jordan C Holter, DO      acetaminophen  650 mg Oral Q6H PRN HOLLI Lion      aluminum-magnesium hydroxide-simethicone  30 mL Oral Q4H PRN Jordan C Holter, DO      amLODIPine  5 mg Oral Daily HOLLI Lion      Artificial Tears  1 drop Both Eyes Q3H PRN Jordan C Holter, DO      atropine  1 drop Sublingual HS HOLLI Lion      atropine  1 drop Sublingual Daily PRN HOLLI Lion      haloperidol lactate  2.5 mg Intramuscular Q4H PRN Max 4/day STEVE LionNP      And    LORazepam  1 mg Intramuscular Q4H PRN Max 4/day STEVE LionNP      And    benztropine  0.5 mg Intramuscular Q4H PRN Max 4/day HOLLI Lion      benztropine  1 mg Intramuscular Q4H PRN Max 6/day Jordan C Holter, DO      haloperidol lactate  5 mg Intramuscular Q4H PRN Max 4/day STEVE LionNP      And    LORazepam  2 mg Intramuscular Q4H PRN Max 4/day HOLLI Lion      And    benztropine  1 mg Intramuscular Q4H PRN Max 4/day HOLLI Lion      benztropine  1 mg Oral Q4H PRN Max 6/day HOLLI Lion      benztropine  1 mg Oral Q4H PRN Max 6/day Jordan C Holter,       bisacodyl  10 mg Rectal Daily PRN HOLLI Lion      cloZAPine  500 mg Oral HS Bora Rosario MD      cyanocobalamin  1,000 mcg Oral Daily HOLLI Galvan      hydrOXYzine HCL  50 mg Oral Q6H PRN Max 4/day Jordan C Holter, DO      Or    diphenhydrAMINE  50 mg Intramuscular Q6H PRN Jordan C Holter, DO      hydrOXYzine HCL  50 mg Oral Q6H PRN Max 4/day HOLLI Lion       Or    diphenhydrAMINE  50 mg Intramuscular Q6H PRN HOLLI Lion      diphenhydrAMINE-zinc acetate   Topical BID PRN HOLLI Lion      escitalopram  20 mg Oral Daily HOLLI Lion      haloperidol  1 mg Oral Q6H PRN HOLLI Lion      haloperidol  2.5 mg Oral Q4H PRN Max 4/day HOLLI Lion      haloperidol  5 mg Oral Q4H PRN Max 4/day HOLLI Lion      hydrocortisone   Topical 4x Daily PRN HOLLI Lion      hydrOXYzine HCL  100 mg Oral Q6H PRN Max 4/day [facility] Holter, DO      Or    LORazepam  2 mg Intramuscular Q6H PRN [facility] Holter, DO      hydrOXYzine HCL  100 mg Oral Q6H PRN Max 4/day HOLLI Lion      Or    LORazepam  2 mg Intramuscular Q6H PRN HOLLI Lion      hydrOXYzine HCL  25 mg Oral Q6H PRN Max 4/day [facility] Holter, DO      ibuprofen  600 mg Oral Q8H PRN HOLLI Lion      melatonin  3 mg Oral HS [facility] Holter, DO      metFORMIN  500 mg Oral Daily With Breakfast HOLLI Lion      methocarbamol  500 mg Oral Q6H PRN HOLLI Lion      nicotine polacrilex  2 mg Oral Q4H PRN Bora Rosario MD      OLANZapine  5 mg Oral Q4H PRN Max 3/day [facility] Holter, DO      Or    OLANZapine  2.5 mg Intramuscular Q4H PRN Max 3/day [facility] Holter, DO      OLANZapine  5 mg Oral Q3H PRN Max 3/day [facility] Holter, DO      Or    OLANZapine  5 mg Intramuscular Q3H PRN Max 3/day [facility] Holter, DO      OLANZapine  2.5 mg Oral Q4H PRN Max 6/day [facility] Holter, DO      ondansetron  4 mg Oral Q6H PRN HOLLI Lion      polyethylene glycol  17 g Oral Daily HOLLI Lion      polyethylene glycol  17 g Oral Daily PRN [facility] Holter, DO      propranolol  10 mg Oral Q12H KAYLIE HOLLI Lion      risperiDONE  2 mg Oral HS Bora Rosario MD      senna-docusate sodium  1 tablet Oral Daily PRN [facility] Holter, DO      senna-docusate sodium  2 tablet Oral BID HOLLI Lion      traZODone  50 mg Oral HS PRN HOLLI Lion  petrolatum-mineral oil   Topical TID PRN HOLLI Lion         Counseling / Coordination of Care: Total floor / unit time spent today 15 minutes. Greater than 50% of total time was spent with the patient and / or family counseling and / or somewhat receptive to supportive listening and teaching positive coping skills to deal with symptom mangement.     Patient's Rights, confidentiality and exceptions to confidentiality, use of automated medical record, Behavioral Health Services staff access to medical record, and consent to treatment reviewed.    This note has been dictated and hence there may be problems with punctuation, spelling and formatting and if anyone has any concerns please address them to Dr. Rosario   This note is not shared with patient due to potential for making patient's condition worse by knowing the content of the note.

## 2024-05-25 NOTE — NURSING NOTE
Alberto is visible on the unit in the milieu. He said he was tired after having ECT. He was on the phone quite a bit this evening. He was quiet with writer and offered little except to say that the voices persist a bit andf they ontinue to threaten the safety of his family but he is not afraid by them as this is not new.  He continues to refuse his Atropine drops  He takes Nicotine gum prn twice this evening.  He says his depression is about 6-7/10 nd his affect continues to appear blunted

## 2024-05-25 NOTE — NURSING NOTE
Received pt in the living room at 2300 sleeping in a chair.  Easily aroused and retired to his bed.  Appears to be sleeping thus this far as per q 7 min safety checks.

## 2024-05-25 NOTE — PLAN OF CARE
Problem: Alteration in Thoughts and Perception  Goal: Treatment Goal: Gain control of psychotic behaviors/thinking, reduce/eliminate presenting symptoms and demonstrate improved reality functioning upon discharge  Outcome: Progressing  Goal: Verbalize thoughts and feelings  Description: Interventions:  - Promote a nonjudgmental and trusting relationship with the patient through active listening and therapeutic communication  - Assess patient's level of functioning, behavior and potential for risk  - Engage patient in 1 on 1 interactions  - Encourage patient to express fears, feelings, frustrations, and discuss symptoms    - [place] patient to reality, help patient recognize reality-based thinking   - Administer medications as ordered and assess for potential side effects  - Provide the patient education related to the signs and symptoms of the illness and desired effects of prescribed medications  Outcome: Progressing  Goal: Refrain from acting on delusional thinking/internal stimuli  Description: Interventions:  - Monitor patient closely, per order   - Utilize least restrictive measures   - Set reasonable limits, give positive feedback for acceptable   - Administer medications as ordered and monitor of potential side effects  Outcome: Progressing  Goal: Agree to be compliant with medication regime, as prescribed and report medication side effects  Description: Interventions:  - Offer appropriate PRN medication and supervise ingestion; conduct AIMS, as needed   Outcome: Progressing  Goal: Attend and participate in unit activities, including therapeutic, recreational, and educational groups  Description: Interventions:  -Encourage Visitation and family involvement in care  Outcome: Progressing  Goal: Complete daily ADLs, including personal hygiene independently, as able  Description: Interventions:  - Observe, teach, and assist patient with ADLS  - Monitor and promote a balance of rest/activity, with adequate  nutrition and elimination   Outcome: Progressing     Problem: Ineffective Coping  Goal: Demonstrates healthy coping skills  Outcome: Progressing  Goal: Participates in unit activities  Description: Interventions:  - Provide therapeutic environment   - Provide required programming   - Redirect inappropriate behaviors   Outcome: Progressing     Problem: Depression  Goal: Treatment Goal: Demonstrate behavioral control of depressive symptoms, verbalize feelings of improved mood/affect, and adopt new coping skills prior to discharge  Outcome: Progressing  Goal: Verbalize thoughts and feelings  Description: Interventions:  - Assess and re-assess patient's level of risk   - Engage patient in 1:1 interactions, daily, for a minimum of 15 minutes   - Encourage patient to express feelings, fears, frustrations, hopes   Outcome: Progressing  Goal: Refrain from harming self  Description: Interventions:  - Monitor patient closely, per order   - Supervise medication ingestion, monitor effects and side effects   Outcome: Progressing  Goal: Refrain from isolation  Description: Interventions:  - Develop a trusting relationship   - Encourage socialization   Outcome: Progressing  Goal: Refrain from self-neglect  Outcome: Progressing  Goal: Complete daily ADLs, including personal hygiene independently, as able  Description: Interventions:  - Observe, teach, and assist patient with ADLS  -  Monitor and promote a balance of rest/activity, with adequate nutrition and elimination   Outcome: Progressing     Problem: Anxiety  Goal: Anxiety is at manageable level  Description: Interventions:  - Assess and monitor patient's anxiety level.   - Monitor for signs and symptoms (heart palpitations, chest pain, shortness of breath, headaches, nausea, feeling jumpy, restlessness, irritable, apprehensive).   - Collaborate with interdisciplinary team and initiate plan and interventions as ordered.  - [place] patient to unit/surroundings  - Explain  treatment plan  - Encourage participation in care  - Encourage verbalization of concerns/fears  - Identify coping mechanisms  - Assist in developing anxiety-reducing skills  - Administer/offer alternative therapies  - Limit or eliminate stimulants  Outcome: Progressing     Problem: Alteration in Orientation  Goal: Treatment Goal: Demonstrate a reduction of confusion and improved orientation to person, place, time and/or situation upon discharge, according to optimum baseline/ability  Outcome: Progressing  Goal: Attend and participate in unit activities, including therapeutic, recreational, and educational groups  Description: Interventions:  - Provide therapeutic and educational activities daily, encourage attendance and participation, and document same in the medical record   - Provide appropriate opportunities for reminiscence   - Provide a consistent daily routine   - Encourage family contact/visitation   Outcome: Progressing     Problem: Electroconvulsive therapy (ECT)  Goal: Treatment Goal: Demonstrate a reduction of confusion and improved orientation to person, place, time and/or situation upon discharge, according to optimum baseline/ability  Outcome: Progressing  Goal: Verbalizes/displays adequate comfort level or baseline comfort level  Description: Interventions:  - Encourage patient to monitor pain and request assistance  - Assess pain using appropriate pain scale  - Administer analgesics based on type and severity of pain and evaluate response  - Implement non-pharmacological measures as appropriate and evaluate response  - Consider cultural and social influences on pain and pain management  - Notify physician/advanced practitioner if interventions unsuccessful or patient reports new pain  Outcome: Progressing  Goal: Achieves stable or improved neurological status  Description: INTERVENTIONS  - Monitor and report changes in neurological status  - Monitor vital signs such as temperature, blood pressure,  glucose, and any other labs ordered   - Initiate measures to prevent increased intracranial pressure  - Monitor for seizure activity and implement precautions if appropriate      Outcome: Progressing  Goal: Achieves maximal functionality and self care  Description: INTERVENTIONS  - Monitor swallowing and airway patency with patient fatigue and changes in neurological status  - Encourage and assist patient to increase activity and self care.   - Encourage visually impaired, hearing impaired and aphasic patients to use assistive/communication devices  Outcome: Progressing  Goal: Maintain or return mobility to safest level of function  Description: INTERVENTIONS:  - Assess patient's ability to carry out ADLs; assess patient's baseline for ADL function and identify physical deficits which impact ability to perform ADLs (bathing, care of mouth/teeth, toileting, grooming, dressing, etc.)  - Assess/evaluate cause of self-care deficits   - Assess range of motion  - Assess patient's mobility  - Assess patient's need for assistive devices and provide as appropriate  - Encourage maximum independence but intervene and supervise when necessary  - Involve family in performance of ADLs  - Assess for home care needs following discharge   - Consider OT consult to assist with ADL evaluation and planning for discharge  - Provide patient education as appropriate  Outcome: Progressing  Goal: Minimal or absence of nausea and/or vomiting  Description: INTERVENTIONS:  - Administer IV fluids if ordered to ensure adequate hydration  - Maintain NPO status until nausea and vomiting are resolved  - Nasogastric tube if ordered  - Administer ordered antiemetic medications as needed  - Provide nonpharmacologic comfort measures as appropriate  - Advance diet as tolerated, if ordered  - Consider nutrition services referral to assist patient with adequate nutrition and appropriate food choices  Outcome: Progressing  Goal: Maintains adequate  nutritional intake  Description: INTERVENTIONS:  - Monitor percentage of each meal consumed  - Identify factors contributing to decreased intake, treat as appropriate  - Assist with meals as needed  - Monitor I&O, weight, and lab values if indicated  - Obtain nutrition services referral as needed  Outcome: Progressing     Problem: DISCHARGE PLANNING - CARE MANAGEMENT  Goal: Discharge to post-acute care or home with appropriate resources  Description: INTERVENTIONS:  - Conduct assessment to determine patient/family and health care team treatment goals, and need for post-acute services based on payer coverage, community resources, and patient preferences, and barriers to discharge  - Address psychosocial, clinical, and financial barriers to discharge as identified in assessment in conjunction with the patient/family and health care team  - Arrange appropriate level of post-acute services according to patient’s   needs and preference and payer coverage in collaboration with the physician and health care team  - Communicate with and update the patient/family, physician, and health care team regarding progress on the discharge plan  - Arrange appropriate transportation to post-acute venues  Outcome: Progressing

## 2024-05-25 NOTE — NURSING NOTE
Alert, cooperative and visible intermittently. No SI or HI noted. Pt states he has depression na 7/10, and some anxiety, and auditory hallucinations. Routine lexapro administered for depression and pt utilizes going to groups  to help cope with anxiety. Pt states his auditory hallucinations tell him they are going to kill him. Pt states he is able to ignore the voices. Denies pain. Attended coffee talk, exercise, open art, and coping skills. Consumed 100% of breakfast and 100% of lunch. Took all medication without prompting. Nicotine gum administered as ordered. Virtual visit with friend went well. Maintained on safe precautions without incident. Will continue to monitor progress and recovery program.

## 2024-05-26 PROCEDURE — 99232 SBSQ HOSP IP/OBS MODERATE 35: CPT | Performed by: PSYCHIATRY & NEUROLOGY

## 2024-05-26 RX ORDER — FLUTICASONE PROPIONATE 50 MCG
1 SPRAY, SUSPENSION (ML) NASAL DAILY
Status: DISCONTINUED | OUTPATIENT
Start: 2024-05-27 | End: 2024-10-10

## 2024-05-26 RX ORDER — LORATADINE 10 MG/1
10 TABLET ORAL DAILY
Status: DISCONTINUED | OUTPATIENT
Start: 2024-05-26 | End: 2025-03-14 | Stop reason: HOSPADM

## 2024-05-26 RX ADMIN — NICOTINE POLACRILEX 2 MG: 2 GUM, CHEWING ORAL at 08:39

## 2024-05-26 RX ADMIN — NICOTINE POLACRILEX 2 MG: 2 GUM, CHEWING ORAL at 12:39

## 2024-05-26 RX ADMIN — ESCITALOPRAM OXALATE 20 MG: 10 TABLET, FILM COATED ORAL at 08:39

## 2024-05-26 RX ADMIN — METFORMIN HYDROCHLORIDE 500 MG: 500 TABLET ORAL at 08:40

## 2024-05-26 RX ADMIN — PROPRANOLOL HYDROCHLORIDE 10 MG: 10 TABLET ORAL at 21:32

## 2024-05-26 RX ADMIN — CYANOCOBALAMIN TAB 1000 MCG 1000 MCG: 1000 TAB at 08:39

## 2024-05-26 RX ADMIN — CLOZAPINE 500 MG: 100 TABLET ORAL at 21:31

## 2024-05-26 RX ADMIN — SENNOSIDES AND DOCUSATE SODIUM 2 TABLET: 8.6; 5 TABLET ORAL at 08:39

## 2024-05-26 RX ADMIN — MELATONIN TAB 3 MG 3 MG: 3 TAB at 21:32

## 2024-05-26 RX ADMIN — AMLODIPINE BESYLATE 5 MG: 5 TABLET ORAL at 08:39

## 2024-05-26 RX ADMIN — SENNOSIDES AND DOCUSATE SODIUM 2 TABLET: 8.6; 5 TABLET ORAL at 17:16

## 2024-05-26 RX ADMIN — POLYETHYLENE GLYCOL 3350 17 G: 17 POWDER, FOR SOLUTION ORAL at 08:38

## 2024-05-26 RX ADMIN — NICOTINE POLACRILEX 2 MG: 2 GUM, CHEWING ORAL at 17:16

## 2024-05-26 RX ADMIN — LORATADINE 10 MG: 10 TABLET ORAL at 21:33

## 2024-05-26 RX ADMIN — RISPERIDONE 2 MG: 2 TABLET, FILM COATED ORAL at 21:51

## 2024-05-26 RX ADMIN — PROPRANOLOL HYDROCHLORIDE 10 MG: 10 TABLET ORAL at 08:40

## 2024-05-26 NOTE — NURSING NOTE
Patient visible and social with select peers. Patient pleasant upon approach. Medication compliant and cooperative with staff. Patient's behaviors were controlled and appropriate all evening. No complaints noted this shift.

## 2024-05-26 NOTE — NURSING NOTE
Weekly wellness assessment completed. Pt lung sounds clear in all lung fields. RLE noted with +2 pitting edema and L lower ext noted with +1 pitting edema. Bowel sounds +x4. B/l pedal pulses papable. Skin intact, except with dryness to b/l feet. Skin warm and color within normal limits for patient.

## 2024-05-26 NOTE — PLAN OF CARE
Problem: Alteration in Thoughts and Perception  Goal: Complete daily ADLs, including personal hygiene independently, as able  Description: Interventions:  - Observe, teach, and assist patient with ADLS  - Monitor and promote a balance of rest/activity, with adequate nutrition and elimination   Outcome: Progressing     Problem: Ineffective Coping  Goal: Demonstrates healthy coping skills  Outcome: Progressing  Goal: Participates in unit activities  Description: Interventions:  - Provide therapeutic environment   - Provide required programming   - Redirect inappropriate behaviors   Outcome: Progressing     Problem: Depression  Goal: Refrain from harming self  Description: Interventions:  - Monitor patient closely, per order   - Supervise medication ingestion, monitor effects and side effects   Outcome: Progressing  Goal: Refrain from isolation  Description: Interventions:  - Develop a trusting relationship   - Encourage socialization   Outcome: Progressing  Goal: Refrain from self-neglect  Outcome: Progressing  Goal: Attend and participate in unit activities, including therapeutic, recreational, and educational groups  Description: Interventions:  - Provide therapeutic and educational activities daily, encourage attendance and participation, and document same in the medical record   Outcome: Progressing  Goal: Complete daily ADLs, including personal hygiene independently, as able  Description: Interventions:  - Observe, teach, and assist patient with ADLS  -  Monitor and promote a balance of rest/activity, with adequate nutrition and elimination   Outcome: Progressing     Problem: Alteration in Orientation  Goal: Treatment Goal: Demonstrate a reduction of confusion and improved orientation to person, place, time and/or situation upon discharge, according to optimum baseline/ability  Outcome: Progressing  Goal: Interact with staff daily  Description: Interventions:  - Assess and re-assess patient's level of  orientation  - Engage patient in 1 on 1 interactions, daily, for a minimum of 15 minutes   - Establish rapport/trust with patient   Outcome: Progressing  Goal: Express concerns related to confused thinking related to:  Description: Interventions:  - Encourage patient to express feelings, fears, frustrations, hopes  - Assign consistent caregivers   - [place]/re-orient patient as needed  - Allow comfort items, as appropriate  - Provide visual cues, signs, etc.   Outcome: Progressing  Goal: Allow medical examinations, as recommended  Description: Interventions:  - Provide physical/neurological exams and/or referrals, per provider   Outcome: Progressing  Goal: Cooperate with recommended testing/procedures  Description: Interventions:  - Determine need for ancillary testing  - Observe for mental status changes  - Implement falls/precaution protocol   Outcome: Progressing  Goal: Attend and participate in unit activities, including therapeutic, recreational, and educational groups  Description: Interventions:  - Provide therapeutic and educational activities daily, encourage attendance and participation, and document same in the medical record   - Provide appropriate opportunities for reminiscence   - Provide a consistent daily routine   - Encourage family contact/visitation   Outcome: Progressing  Goal: Complete daily ADLs, including personal hygiene independently, as able  Description: Interventions:  - Observe, teach, and assist patient with ADLS  Outcome: Progressing     Problem: Electroconvulsive therapy (ECT)  Goal: Verbalizes/displays adequate comfort level or baseline comfort level  Description: Interventions:  - Encourage patient to monitor pain and request assistance  - Assess pain using appropriate pain scale  - Administer analgesics based on type and severity of pain and evaluate response  - Implement non-pharmacological measures as appropriate and evaluate response  - Consider cultural and social influences on  pain and pain management  - Notify physician/advanced practitioner if interventions unsuccessful or patient reports new pain  Outcome: Progressing  Goal: Achieves stable or improved neurological status  Description: INTERVENTIONS  - Monitor and report changes in neurological status  - Monitor vital signs such as temperature, blood pressure, glucose, and any other labs ordered   - Initiate measures to prevent increased intracranial pressure  - Monitor for seizure activity and implement precautions if appropriate      Outcome: Progressing  Goal: Achieves maximal functionality and self care  Description: INTERVENTIONS  - Monitor swallowing and airway patency with patient fatigue and changes in neurological status  - Encourage and assist patient to increase activity and self care.   - Encourage visually impaired, hearing impaired and aphasic patients to use assistive/communication devices  Outcome: Progressing  Goal: Maintain or return mobility to safest level of function  Description: INTERVENTIONS:  - Assess patient's ability to carry out ADLs; assess patient's baseline for ADL function and identify physical deficits which impact ability to perform ADLs (bathing, care of mouth/teeth, toileting, grooming, dressing, etc.)  - Assess/evaluate cause of self-care deficits   - Assess range of motion  - Assess patient's mobility  - Assess patient's need for assistive devices and provide as appropriate  - Encourage maximum independence but intervene and supervise when necessary  - Involve family in performance of ADLs  - Assess for home care needs following discharge   - Consider OT consult to assist with ADL evaluation and planning for discharge  - Provide patient education as appropriate  Outcome: Progressing  Goal: Absence of urinary retention  Description: INTERVENTIONS:  - Assess patient’s ability to void and empty bladder  - Monitor I/O  - Bladder scan as needed  - Discuss with physician/AP medications to alleviate  retention as needed  - Discuss catheterization for long term situations as appropriate  Outcome: Progressing  Goal: Minimal or absence of nausea and/or vomiting  Description: INTERVENTIONS:  - Administer IV fluids if ordered to ensure adequate hydration  - Maintain NPO status until nausea and vomiting are resolved  - Nasogastric tube if ordered  - Administer ordered antiemetic medications as needed  - Provide nonpharmacologic comfort measures as appropriate  - Advance diet as tolerated, if ordered  - Consider nutrition services referral to assist patient with adequate nutrition and appropriate food choices  Outcome: Progressing  Goal: Maintains adequate nutritional intake  Description: INTERVENTIONS:  - Monitor percentage of each meal consumed  - Identify factors contributing to decreased intake, treat as appropriate  - Assist with meals as needed  - Monitor I&O, weight, and lab values if indicated  - Obtain nutrition services referral as needed  Outcome: Progressing

## 2024-05-26 NOTE — PROGRESS NOTES
Psychiatry Progress Note [facility]    Alberto Berumen 28 y.o. male MRN: [id number]  Unit/Bed#: [facility] 101-02 Encounter: [id number]  Code Status: Level 1 - Full Code    PCP: Joce Juan MD    Date of Admission:  3/29/2024 2008   Date of Service:  05/26/24    Patient Active Problem List   Diagnosis    GERD (gastroesophageal reflux disease)    Medical clearance for psychiatric admission    Schizoaffective disorder, bipolar type (HCC)    Tobacco abuse    T wave inversion in EKG    Syringoma    Chronic idiopathic constipation    Vitamin B 12 deficiency    Vitamin D deficiency    Confluent and reticulate papillomatosis    Class 2 obesity in adult    Primary hypertension    Elevated hemoglobin A1c     Review of systems: Unremarkable   diagnosis: Schizoaffective bipolar  assessment  Overall Status: Continues to ask for more ECTs claiming they help in subduing the voices but still hears them that are threatening to kill him and his family and not commanding.  More visible interacting with select peers and he continues to insist ECTs as helped him to stay out of his head   certification Statement: The patient will continue to require additional inpatient hospital stay due to ongoing voices that are threatening to kill him and his family lack of response to medications and ECT so far.     Medications: Clozapine 500 mg at bedtime, propranolol 10 mg every 12 hours as as needed for anxiety, Risperdal 2 mg at bedtime Lexapro 20 mg once a day, atropine eyedrops sublingual for drooling of saliva and senna 2 tablets twice a day for constipation  All medications reviewed and recommend they be continued for symptom management  side effects from treatment: None reported  Medication changes   None today   medication education   Risks side effects benefits and precautions of medications discussed with patient and he did verbalize an understanding about risks for metabolic syndrome from being on neuroleptics and is  form tardive dyskinesia etc. and special precautions about being on clozapine   Understanding of medications: Has some understanding   Justification for dual anti-psychotics: Not applicable    Non-pharmacological treatments  Continue with individual, group, milieu and occupational therapy using recovery principles and psycho-education about accepting illness and the need for treatment.   to contact aunt and explore ACT team referral which he never had  ECT to be continued  weekly for maintenance starting on 4/12/2024 which ended as of yesterday, plan is to do 5 more q 2 weekly ECTs as he insists it is helpful so far in controlling the voices  Refuses to see a dietician and agrees to do more exercises as he is about 100 lbs overweight     Safety  Safety and communication plan established to target dynamic risk factors discussed above.    Discharge Plan   Back to his aunt with an ACT team    Interval Progress   No significant changes noted.  Still withdrawn and isolated with a flat constricted affect and interacting with select peers more visible.  Continues to report ECTs have definitely helped him to stay out of his head because he can live with the voices and can distract himself.  He is still threatening to kill him and his family and hears them all the time and has been hearing them for more than 2 years but he believes the ECTs have helped him gain control over the voices.  Has not been aggressive or agitated or threatening or self-abusive and polite friendly pleasant when approached.  Interacting superficially with select peers with a flat affect and continues to wear hospital clothing.  Voices have not been commanding at all and found laying in bed under the covers most of the time and at other times found outside interacting with select peers  .  I Acceptance by patient: Accepting  Hopefulness in recovery: Living with his aunt  Involved in reintegration process: Talking to his aunt and  sister  Trusting in relationship with psychiatrist: Trusting  Sleep: Good  Appetite: Good  Compliance with Medications: Good  Group attendance: some of the groups  Significant events: Improving slowly claiming voices are less because of ECTs and asking for more    Mental Status Exam  Appearance: age appropriate, improved grooming, looks older than stated age, overweight, with hair in dreadlocks casually dressed with a clean shaven face, fairly groomed with good eye contact found pacing the hallway dressed in hospital clothing as usual  behavior: cooperative, mildly anxious, evasive, gesturing, slow responses.   speech: normal rate, normal volume, normal pitch  Mood: dysphoric, anxious, continues to report feeling good with the ECT   affect: constricted, inappropriate, mood-congruent  Thought Process: organized, logical, coherent, goal directed, linear, decreased rate of thoughts, slowing of thoughts, negative thinking, impaired abstract reasoning, concrete  Thought Content: paranoid ideation, some paranoia, grandiose ideas, intrusive thoughts, preoccupied, chronic, continues to report paranoia about people threatening to kill him and his family because of the voices.  He believes ECT has helped so that he is not much in his head.  No other delusions elicited.  No current suicidal or homicidal thoughts and no plans verbalized.  No phobias obsessions compulsions or distorted body perceptions reported.  Preoccupied with wanting to get ECTs more often despite reminding him that it may impair his memory and finally he agreed to such as it is to every 2 weeks  Perceptual Disturbances: Still hearing same threatening voices to kill him and his family but no longer feels too upset with him stating he can stay out of his head because of ECTs   Hx Risk Factors: chronic psychiatric problems, chronic anxiety symptoms, chronic psychotic symptoms,    Sensorium: Oriented x 3 spheres and situation  Cognition: recent and remote  memory grossly intact  Consciousness: alert and awake  Attention: attention span and concentration are age appropriate  Intellect: appears to be of average intelligence  Insight: intact  Judgement: intact  Motor Activity: no abnormal movements     Vitals  Temp:  [97.8 °F (36.6 °C)] 97.8 °F (36.6 °C)  HR:  [] 102  Resp:  [18] 18  BP: (131-143)/(78-91) 131/78  SpO2:  [98 %] 98 %  No intake or output data in the 24 hours ending 05/26/24 0730              Lab Results: All Labs For Current Hospital Admission Reviewed     Current Facility-Administered Medications   Medication Dose Route Frequency Provider Last Rate    acetaminophen  650 mg Oral Q4H PRN Jordan C Holter,       acetaminophen  650 mg Oral Q6H PRN HOLLI Lion      aluminum-magnesium hydroxide-simethicone  30 mL Oral Q4H PRN Jordan C Holter, DO      amLODIPine  5 mg Oral Daily HOLLI Lion      Artificial Tears  1 drop Both Eyes Q3H PRN Jordan C Holter, DO      atropine  1 drop Sublingual HS HOLLI Lion      atropine  1 drop Sublingual Daily PRN STEVE LionNP      haloperidol lactate  2.5 mg Intramuscular Q4H PRN Max 4/day STVEE LionNP      And    LORazepam  1 mg Intramuscular Q4H PRN Max 4/day HOLLI Lion      And    benztropine  0.5 mg Intramuscular Q4H PRN Max 4/day STEVE LionNP      benztropine  1 mg Intramuscular Q4H PRN Max 6/day Jordan C Holter,       haloperidol lactate  5 mg Intramuscular Q4H PRN Max 4/day STEVE LionNP      And    LORazepam  2 mg Intramuscular Q4H PRN Max 4/day HOLLI Lion      And    benztropine  1 mg Intramuscular Q4H PRN Max 4/day HOLLI Lion      benztropine  1 mg Oral Q4H PRN Max 6/day HOLLI Lion      benztropine  1 mg Oral Q4H PRN Max 6/day Jordan C Holter, DO      bisacodyl  10 mg Rectal Daily PRN HOLLI Lion      cloZAPine  500 mg Oral HS Bora Rosario MD      cyanocobalamin  1,000 mcg Oral Daily HOLLI Galvan      hydrOXYzine  HCL  50 mg Oral Q6H PRN Max 4/day Ion FISCHER Holter, DO      Or    diphenhydrAMINE  50 mg Intramuscular Q6H PRN Ion FISCHER Holter, DO      hydrOXYzine HCL  50 mg Oral Q6H PRN Max 4/day HOLLI Lion      Or    diphenhydrAMINE  50 mg Intramuscular Q6H PRN HOLLI Lion      diphenhydrAMINE-zinc acetate   Topical BID PRN HOLLI Lion      escitalopram  20 mg Oral Daily HOLLI Lion      haloperidol  1 mg Oral Q6H PRN HOLLI Lion      haloperidol  2.5 mg Oral Q4H PRN Max 4/day HOLLI Lion      haloperidol  5 mg Oral Q4H PRN Max 4/day HOLLI Lion      hydrocortisone   Topical 4x Daily PRN HOLLI Lion      hydrOXYzine HCL  100 mg Oral Q6H PRN Max 4/day Ion uDpontter, DO      Or    LORazepam  2 mg Intramuscular Q6H PRN Jordan C Holter, DO      hydrOXYzine HCL  100 mg Oral Q6H PRN Max 4/day HOLLI Lion      Or    LORazepam  2 mg Intramuscular Q6H PRN HOLLI Lion      hydrOXYzine HCL  25 mg Oral Q6H PRN Max 4/day Ion FISCHER Holter, DO      ibuprofen  600 mg Oral Q8H PRN HOLLI Lion      melatonin  3 mg Oral HS Ion Dupontter, DO      metFORMIN  500 mg Oral Daily With Breakfast HOLLI Lion      methocarbamol  500 mg Oral Q6H PRN HOLLI Lion      nicotine polacrilex  2 mg Oral Q4H PRN Bora Rosario MD      OLANZapine  5 mg Oral Q4H PRN Max 3/day Ion AALIYAH Holter, DO      Or    OLANZapine  2.5 mg Intramuscular Q4H PRN Max 3/day [facility] Holter, DO      OLANZapine  5 mg Oral Q3H PRN Max 3/day Ion  Holter, DO      Or    OLANZapine  5 mg Intramuscular Q3H PRN Max 3/day Ion  Holter, DO      OLANZapine  2.5 mg Oral Q4H PRN Max 6/day [facility] Holter, DO      ondansetron  4 mg Oral Q6H PRN HOLLI Lion      polyethylene glycol  17 g Oral Daily HOLLI Lion      polyethylene glycol  17 g Oral Daily PRN Jordan C Holter,       propranolol  10 mg Oral Q12H HOLLI Marrufo      risperiDONE  2 mg Oral HS Bora Rosario MD       senna-docusate sodium  1 tablet Oral Daily PRN Jordan C Holter, DO      senna-docusate sodium  2 tablet Oral BID HOLLI Lion      traZODone  50 mg Oral HS PRN HOLLI Lion      white petrolatum-mineral oil   Topical TID PRN HOLLI Lion         Counseling / Coordination of Care: Total floor / unit time spent today 15 minutes. Greater than 50% of total time was spent with the patient and / or family counseling and / or somewhat receptive to supportive listening and teaching positive coping skills to deal with symptom mangement.     Patient's Rights, confidentiality and exceptions to confidentiality, use of automated medical record, [facility] staff access to medical record, and consent to treatment reviewed.    This note has been dictated and hence there may be problems with punctuation, spelling and formatting and if anyone has any concerns please address them to Dr. Rosario   This note is not shared with patient due to potential for making patient's condition worse by knowing the content of the note.

## 2024-05-26 NOTE — NURSING NOTE
Alert, cooperative and visible intermittently. Consumed 100% of dinner. SLIM medical provider made aware of nasal congestion and pitting edema to b/l lower ext. N.O flonase 50mcg/act 1 spray to nostrils daily and loratadine 10mg daily. Took all medication without prompting. Maintained on safe precautions without incident.

## 2024-05-27 PROBLEM — R60.0 BILATERAL LOWER EXTREMITY EDEMA: Status: ACTIVE | Noted: 2024-05-27

## 2024-05-27 PROCEDURE — 99232 SBSQ HOSP IP/OBS MODERATE 35: CPT | Performed by: NURSE PRACTITIONER

## 2024-05-27 PROCEDURE — 99232 SBSQ HOSP IP/OBS MODERATE 35: CPT

## 2024-05-27 RX ORDER — HYDROCHLOROTHIAZIDE 12.5 MG/1
12.5 TABLET ORAL DAILY
Status: DISCONTINUED | OUTPATIENT
Start: 2024-05-27 | End: 2025-03-14 | Stop reason: HOSPADM

## 2024-05-27 RX ADMIN — POLYETHYLENE GLYCOL 3350 17 G: 17 POWDER, FOR SOLUTION ORAL at 08:24

## 2024-05-27 RX ADMIN — RISPERIDONE 2 MG: 2 TABLET, FILM COATED ORAL at 21:22

## 2024-05-27 RX ADMIN — NICOTINE POLACRILEX 2 MG: 2 GUM, CHEWING ORAL at 17:20

## 2024-05-27 RX ADMIN — NICOTINE POLACRILEX 2 MG: 2 GUM, CHEWING ORAL at 21:22

## 2024-05-27 RX ADMIN — NICOTINE POLACRILEX 2 MG: 2 GUM, CHEWING ORAL at 13:02

## 2024-05-27 RX ADMIN — PROPRANOLOL HYDROCHLORIDE 10 MG: 10 TABLET ORAL at 21:22

## 2024-05-27 RX ADMIN — CLOZAPINE 500 MG: 100 TABLET ORAL at 21:22

## 2024-05-27 RX ADMIN — AMLODIPINE BESYLATE 5 MG: 5 TABLET ORAL at 08:24

## 2024-05-27 RX ADMIN — FLUTICASONE PROPIONATE 1 SPRAY: 50 SPRAY, METERED NASAL at 08:24

## 2024-05-27 RX ADMIN — METFORMIN HYDROCHLORIDE 500 MG: 500 TABLET, FILM COATED ORAL at 17:20

## 2024-05-27 RX ADMIN — PROPRANOLOL HYDROCHLORIDE 10 MG: 10 TABLET ORAL at 08:23

## 2024-05-27 RX ADMIN — SENNOSIDES AND DOCUSATE SODIUM 2 TABLET: 8.6; 5 TABLET ORAL at 17:20

## 2024-05-27 RX ADMIN — LORATADINE 10 MG: 10 TABLET ORAL at 08:24

## 2024-05-27 RX ADMIN — CYANOCOBALAMIN TAB 1000 MCG 1000 MCG: 1000 TAB at 08:23

## 2024-05-27 RX ADMIN — ESCITALOPRAM OXALATE 20 MG: 10 TABLET, FILM COATED ORAL at 08:23

## 2024-05-27 RX ADMIN — NICOTINE POLACRILEX 2 MG: 2 GUM, CHEWING ORAL at 08:24

## 2024-05-27 RX ADMIN — MELATONIN TAB 3 MG 3 MG: 3 TAB at 21:22

## 2024-05-27 RX ADMIN — METFORMIN HYDROCHLORIDE 500 MG: 500 TABLET ORAL at 08:23

## 2024-05-27 RX ADMIN — SENNOSIDES AND DOCUSATE SODIUM 2 TABLET: 8.6; 5 TABLET ORAL at 08:23

## 2024-05-27 RX ADMIN — HYDROCHLOROTHIAZIDE 12.5 MG: 12.5 TABLET ORAL at 13:02

## 2024-05-27 NOTE — PLAN OF CARE
Problem: Alteration in Thoughts and Perception  Goal: Verbalize thoughts and feelings  Description: Interventions:  - Promote a nonjudgmental and trusting relationship with the patient through active listening and therapeutic communication  - Assess patient's level of functioning, behavior and potential for risk  - Engage patient in 1 on 1 interactions  - Encourage patient to express fears, feelings, frustrations, and discuss symptoms    - [place] patient to reality, help patient recognize reality-based thinking   - Administer medications as ordered and assess for potential side effects  - Provide the patient education related to the signs and symptoms of the illness and desired effects of prescribed medications  Outcome: Progressing  Goal: Agree to be compliant with medication regime, as prescribed and report medication side effects  Description: Interventions:  - Offer appropriate PRN medication and supervise ingestion; conduct AIMS, as needed   Outcome: Progressing  Goal: Attend and participate in unit activities, including therapeutic, recreational, and educational groups  Description: Interventions:  -Encourage Visitation and family involvement in care  Outcome: Progressing  Goal: Recognize dysfunctional thoughts, communicate reality-based thoughts at the time of discharge  Description: Interventions:  - Provide medication and psycho-education to assist patient in compliance and developing insight into his/her illness   Outcome: Progressing  Goal: Complete daily ADLs, including personal hygiene independently, as able  Description: Interventions:  - Observe, teach, and assist patient with ADLS  - Monitor and promote a balance of rest/activity, with adequate nutrition and elimination   Outcome: Progressing

## 2024-05-27 NOTE — NURSING NOTE
Alert, cooperative and visible intermittently.Socializing with selective peers.  Consumed 100% of dinner. Took all medication without prompting. Maintained on safe precautions without incident.

## 2024-05-27 NOTE — PLAN OF CARE
Problem: Alteration in Thoughts and Perception  Goal: Verbalize thoughts and feelings  Description: Interventions:  - Promote a nonjudgmental and trusting relationship with the patient through active listening and therapeutic communication  - Assess patient's level of functioning, behavior and potential for risk  - Engage patient in 1 on 1 interactions  - Encourage patient to express fears, feelings, frustrations, and discuss symptoms    - [place] patient to reality, help patient recognize reality-based thinking   - Administer medications as ordered and assess for potential side effects  - Provide the patient education related to the signs and symptoms of the illness and desired effects of prescribed medications  5/27/2024 0233 by Monica Law RN  Outcome: Progressing  5/26/2024 2318 by Monica Law RN  Outcome: Progressing  Goal: Agree to be compliant with medication regime, as prescribed and report medication side effects  Description: Interventions:  - Offer appropriate PRN medication and supervise ingestion; conduct AIMS, as needed   Outcome: Progressing  Goal: Attend and participate in unit activities, including therapeutic, recreational, and educational groups  Description: Interventions:  -Encourage Visitation and family involvement in care  Outcome: Progressing  Goal: Recognize dysfunctional thoughts, communicate reality-based thoughts at the time of discharge  Description: Interventions:  - Provide medication and psycho-education to assist patient in compliance and developing insight into his/her illness   Outcome: Progressing  Goal: Complete daily ADLs, including personal hygiene independently, as able  Description: Interventions:  - Observe, teach, and assist patient with ADLS  - Monitor and promote a balance of rest/activity, with adequate nutrition and elimination   Outcome: Progressing     Problem: Ineffective Coping  Goal: Identifies ineffective coping skills  Outcome: Progressing  Goal:  Identifies healthy coping skills  Outcome: Progressing     Problem: Depression  Goal: Treatment Goal: Demonstrate behavioral control of depressive symptoms, verbalize feelings of improved mood/affect, and adopt new coping skills prior to discharge  Outcome: Progressing  Goal: Verbalize thoughts and feelings  Description: Interventions:  - Assess and re-assess patient's level of risk   - Engage patient in 1:1 interactions, daily, for a minimum of 15 minutes   - Encourage patient to express feelings, fears, frustrations, hopes   Outcome: Progressing  Goal: Refrain from harming self  Description: Interventions:  - Monitor patient closely, per order   - Supervise medication ingestion, monitor effects and side effects   Outcome: Progressing  Goal: Refrain from isolation  Description: Interventions:  - Develop a trusting relationship   - Encourage socialization   Outcome: Progressing

## 2024-05-27 NOTE — ASSESSMENT & PLAN NOTE
Contacted by RN that patient had bilateral lower extremity edema  Patient seen and examined by myself today  Patient has 1-2+ pitting edema of his bilateral ankles.  Patient refuses to be weighed.  Patient's blood pressures are borderline 130s over 80s 130s over 90.  I started HCTZ TZ 12.5 mg p.o. daily  I have increased his Glucophage to 500 mg p.o. twice daily  I have encouraged the patient to please let the nursing staff weigh  Have educated the patient on his diet choices and elevation of his bilateral lower extremities when he is just sitting around

## 2024-05-27 NOTE — PROGRESS NOTES
"[facility]  Progress Note  Name: Alberto Berumen I  MRN: [id number]  Unit/Bed#: [facility] 101-02 I Date of Admission: 3/29/2024   Date of Service: 2024 I Hospital Day: 59    Assessment & Plan   Bilateral lower extremity edema  Assessment & Plan  Contacted by RN that patient had bilateral lower extremity edema  Patient seen and examined by myself today  Patient has 1-2+ pitting edema of his bilateral ankles.  Patient refuses to be weighed.  Patient's blood pressures are borderline 130s over 80s 130s over 90.  I started HCTZ TZ 12.5 mg p.o. daily  I have increased his Glucophage to 500 mg p.o. twice daily  I have encouraged the patient to please let the nursing staff weigh  Have educated the patient on his diet choices and elevation of his bilateral lower extremities when he is just sitting around               Nurse Coordination of Care Discussion: 15 mins    Discussions with Specialists or Other Care Team Provider: none    Education and Discussions with Patient: 10 mins    Time Spent for Care: 45 minutes.  More than 50% of total time spent on counseling and coordination of care as described above.    Current Length of Stay: 59 day(s)    Code Status: Level 1 - Full Code      Subjective:   \" My legs are little swollen\"    Objective:     Vitals:   Temp (24hrs), Av.8 °F (36.6 °C), Min:97.8 °F (36.6 °C), Max:97.8 °F (36.6 °C)    Temp:  [97.8 °F (36.6 °C)] 97.8 °F (36.6 °C)  HR:  [] 101  Resp:  [18-19] 19  BP: (135-142)/(69-86) 142/86  SpO2:  [98 %] 98 %  Body mass index is 41.87 kg/m².       Physical Exam:     Physical Exam  HENT:      Head: Normocephalic and atraumatic.      Nose: Nose normal.      Mouth/Throat:      Mouth: Mucous membranes are moist.   Eyes:      Extraocular Movements: Extraocular movements intact.   Cardiovascular:      Rate and Rhythm: Normal rate and regular rhythm.   Pulmonary:      Effort: Pulmonary effort is normal.      Breath sounds: Normal breath " sounds.   Abdominal:      General: Abdomen is flat. Bowel sounds are normal.   Musculoskeletal:         General: Normal range of motion.      Cervical back: Normal range of motion.      Right lower leg: Edema present.      Left lower leg: Edema present.      Comments: 1-2+ pitting edema around his ankles bilaterally   Skin:     General: Skin is warm and dry.      Capillary Refill: Capillary refill takes 2 to 3 seconds.   Neurological:      Mental Status: He is alert and oriented to person, place, and time. Mental status is at baseline.   Psychiatric:         Behavior: Behavior normal.           Additional Data:     Labs:                            * I Have Reviewed All Lab Data Listed Above.  * Additional Pertinent Lab Tests Reviewed: All Labs For Current Hospital Admission Reviewed    Imaging:    Imaging Reports Reviewed Today Include: none      Last 24 Hours Medication List:   Current Facility-Administered Medications   Medication Dose Route Frequency Provider Last Rate    acetaminophen  650 mg Oral Q4H PRN Jordan C Holter, DO      acetaminophen  650 mg Oral Q6H PRN HOLLI Lion      aluminum-magnesium hydroxide-simethicone  30 mL Oral Q4H PRN Jordan C Holter, DO      amLODIPine  5 mg Oral Daily HOLLI Lion      Artificial Tears  1 drop Both Eyes Q3H PRN Jordan C Holter, DO      atropine  1 drop Sublingual HS HOLLI Lion      atropine  1 drop Sublingual Daily PRN HOLLI Lion      haloperidol lactate  2.5 mg Intramuscular Q4H PRN Max 4/day STEVE LionNP      And    LORazepam  1 mg Intramuscular Q4H PRN Max 4/day HOLLI Lion      And    benztropine  0.5 mg Intramuscular Q4H PRN Max 4/day HOLLI Lion      benztropine  1 mg Intramuscular Q4H PRN Max 6/day Jordan C Holter, DO      haloperidol lactate  5 mg Intramuscular Q4H PRN Max 4/day HOLLI Lion      And    LORazepam  2 mg Intramuscular Q4H PRN Max 4/day HOLLI Lion      And    benztropine  1 mg  Intramuscular Q4H PRN Max 4/day HOLLI Lion      benztropine  1 mg Oral Q4H PRN Max 6/day HOLLI Lion      benztropine  1 mg Oral Q4H PRN Max 6/day Jordan C Holter, DO      bisacodyl  10 mg Rectal Daily PRN HOLLI Lion      cloZAPine  500 mg Oral HS Bora Rosario MD      cyanocobalamin  1,000 mcg Oral Daily HOLLI Galvan      hydrOXYzine HCL  50 mg Oral Q6H PRN Max 4/day Jordan C Holter, DO      Or    diphenhydrAMINE  50 mg Intramuscular Q6H PRN Jordan C Holter, DO      hydrOXYzine HCL  50 mg Oral Q6H PRN Max 4/day HOLLI Lion      Or    diphenhydrAMINE  50 mg Intramuscular Q6H PRN HOLLI Lion      diphenhydrAMINE-zinc acetate   Topical BID PRN HOLLI Lion      escitalopram  20 mg Oral Daily HOLLI Lion      fluticasone  1 spray Each Nare Daily Brina Guillen MD      haloperidol  1 mg Oral Q6H PRN HOLLI Lion      haloperidol  2.5 mg Oral Q4H PRN Max 4/day HOLLI Lion      haloperidol  5 mg Oral Q4H PRN Max 4/day HOLLI Lion      hydroCHLOROthiazide  12.5 mg Oral Daily HOLLI Galvan      hydrocortisone   Topical 4x Daily PRN HOLLI Lion      hydrOXYzine HCL  100 mg Oral Q6H PRN Max 4/day Jordan C Holter, DO      Or    LORazepam  2 mg Intramuscular Q6H PRN Jordan C Holter, DO      hydrOXYzine HCL  100 mg Oral Q6H PRN Max 4/day HOLLI Lion      Or    LORazepam  2 mg Intramuscular Q6H PRN HOLLI Lion      hydrOXYzine HCL  25 mg Oral Q6H PRN Max 4/day Jordan C Holter, DO      ibuprofen  600 mg Oral Q8H PRN HOLLI Lion      loratadine  10 mg Oral Daily Brina Guillen MD      melatonin  3 mg Oral HS Jordan C Holter, DO      metFORMIN  500 mg Oral BID With Meals HOLLI Galvan      methocarbamol  500 mg Oral Q6H PRN HOLLI Lion      nicotine polacrilex  2 mg Oral Q4H PRN Bora Rosario MD      OLANZapine  5 mg Oral Q4H PRN Max 3/day Jordan C Holter, DO      Or    OLANZapine  2.5 mg  Intramuscular Q4H PRN Max 3/day Ino C Holter, DO      OLANZapine  5 mg Oral Q3H PRN Max 3/day [facility] Holter, DO      Or    OLANZapine  5 mg Intramuscular Q3H PRN Max 3/day [facility] Holter, DO      OLANZapine  2.5 mg Oral Q4H PRN Max 6/day [facility] Holter, DO      ondansetron  4 mg Oral Q6H PRN HOLLI Lion      polyethylene glycol  17 g Oral Daily HOLLI Lion      polyethylene glycol  17 g Oral Daily PRN [facility] Holter, DO      propranolol  10 mg Oral Q12H KAYLIE HOLLI Lion      risperiDONE  2 mg Oral HS Bora Rosario MD      senna-docusate sodium  1 tablet Oral Daily PRN Ion Dupontter, DO      senna-docusate sodium  2 tablet Oral BID HOLLI Lion      traZODone  50 mg Oral HS PRN HOLLI Lion      white petrolatum-mineral oil   Topical TID PRN HOLLI Lion          Today, Patient Was Seen By: HOLLI Galvan      ** Please Note: Dictation voice to text software may have been used in the creation of this document. **    I have spent a total time of 45 minutes on 05/27/24 in caring for this patient including Diagnostic results, Prognosis, Risks and benefits of tx options, Instructions for management, Patient and family education, Importance of tx compliance, Risk factor reductions, Impressions, Counseling / Coordination of care, Documenting in the medical record, Reviewing / ordering tests, medicine, procedures  , Obtaining or reviewing history  , and Communicating with other healthcare professionals .

## 2024-05-27 NOTE — PROGRESS NOTES
Progress Note - Behavioral Health   Alberto Berumen 28 y.o. male MRN: [id number]  Unit/Bed#: [facility] 101-02 Encounter: [id number]      Subjective:     Documentation, nursing notes, medication reconciliation, labs, and vitals reviewed. Patient was seen for continuing care and reviewed with treatment team.  No acute events over the past 24 hours. Per nursing report, patient has been depressed, although visible and social with peers.  There is some reported edema in BL lower extremities, SLIM to evaluate. No medication changes over the past 24 hours.     On evaluation today, Alberto reports mood remains depressed.  He denies SI/Hi with plan or intent.  He is observed walking around unit with peers during walking group.  He is pleasant on approach and agreeable to answering assessment questions.  He denies AVH at current time although appears internally distracted when sitting on chair by himself in back of unit.     Continues to tolerate current medications with no adverse effects.     No self-harming/suicidal ideation, plan, or intent upon direct inquiry. No thoughts to harm others.  No agitation or aggression noted. Does not appear overtly manic. Offers no further complaints.       Psychiatric ROS:  Behavior over the last 24 hours: unchanged  Sleep: normal  Appetite: normal  Medication side effects: No   ROS: all other systems are negative, B/L leg edema      Mental Status Evaluation:    Appearance:  casually dressed, dressed appropriately, adequate grooming   Behavior:  pleasant, cooperative, calm   Speech:  normal rate, normal volume, normal pitch   Mood:  dysphoric, anxious   Affect:  appropriate, mood-congruent   Thought Process:  logical, coherent, goal directed   Associations: intact associations   Thought Content:  mild paranoia   Perceptual Disturbances: denies auditory hallucinations when asked, denies auditory or visual hallucinations when asked, appears distracted, appears preoccupied   Risk Potential:  Suicidal ideation - None at present  Homicidal ideation - None at present  Potential for aggression - No   Sensorium:  oriented to person, place, and time/date   Memory:  recent and remote memory grossly intact   Consciousness:  alert and awake   Attention/Concentration: attention span and concentration appear shorter than expected for age   Insight:  fair   Judgment: fair   Gait/Station: normal gait/station, normal balance   Motor Activity: no abnormal movements       Vital signs in last 24 hours:    Temp:  [97.8 °F (36.6 °C)] 97.8 °F (36.6 °C)  HR:  [] 69  Resp:  [18] 18  BP: (135-137)/(69-86) 135/69    Laboratory results: I have personally reviewed all pertinent laboratory/tests results    Results from the past 24 hours: No results found for this or any previous visit (from the past 24 hour(s)).      Progress Toward Goals: no significant improvement today    Suicide/Homicide Risk Assessment:    Risk of Harm to Self:   Nursing Suicide Risk Assessment Last 24 hours: C-SSRS Risk (Since Last Contact)  Calculated C-SSRS Risk Score (Since Last Contact): No Risk Indicated    Risk of Harm to Others:  Nursing Homicide Risk Assessment: Violence Risk to Others: Denies within past 6 months    Assessment & Plan   Principal Problem:    Schizoaffective disorder, bipolar type (HCC)  Active Problems:    GERD (gastroesophageal reflux disease)    Medical clearance for psychiatric admission    Tobacco abuse    T wave inversion in EKG    Chronic idiopathic constipation    Confluent and reticulate papillomatosis    Primary hypertension    Elevated hemoglobin A1c      Recommended Treatment:     Planned medication and treatment changes:    All current active medications have been reviewed  Encourage group therapy, milieu therapy and occupational therapy  Behavioral Health checks every 7 minutes  Continue with SLIM medical management as indicated  Disposition planning ongoing    Continue current medications:    Current  Facility-Administered Medications   Medication Dose Route Frequency Provider Last Rate    acetaminophen  650 mg Oral Q4H PRN Jordan C Holter, DO      acetaminophen  650 mg Oral Q6H PRN HOLLI Lion      aluminum-magnesium hydroxide-simethicone  30 mL Oral Q4H PRN Jordan C Holter, DO      amLODIPine  5 mg Oral Daily HOLLI Lion      Artificial Tears  1 drop Both Eyes Q3H PRN Jordan C Holter, DO      atropine  1 drop Sublingual HS HOLLI Lion      atropine  1 drop Sublingual Daily PRN HOLLI Lion      haloperidol lactate  2.5 mg Intramuscular Q4H PRN Max 4/day HOLLI Lion      And    LORazepam  1 mg Intramuscular Q4H PRN Max 4/day HOLLI Lion      And    benztropine  0.5 mg Intramuscular Q4H PRN Max 4/day HOLLI Lion      benztropine  1 mg Intramuscular Q4H PRN Max 6/day Jordan C Holter, DO      haloperidol lactate  5 mg Intramuscular Q4H PRN Max 4/day HOLLI Lion      And    LORazepam  2 mg Intramuscular Q4H PRN Max 4/day HOLLI Lion      And    benztropine  1 mg Intramuscular Q4H PRN Max 4/day HOLLI Lion      benztropine  1 mg Oral Q4H PRN Max 6/day HOLLI Lion      benztropine  1 mg Oral Q4H PRN Max 6/day Jordan C Holter, DO      bisacodyl  10 mg Rectal Daily PRN HOLLI Lion      cloZAPine  500 mg Oral HS Bora Rosario MD      cyanocobalamin  1,000 mcg Oral Daily HOLLI Galvan      hydrOXYzine HCL  50 mg Oral Q6H PRN Max 4/day Jordan C Holter, DO      Or    diphenhydrAMINE  50 mg Intramuscular Q6H PRN Jordan C Holter, DO      hydrOXYzine HCL  50 mg Oral Q6H PRN Max 4/day HOLLI Lion      Or    diphenhydrAMINE  50 mg Intramuscular Q6H PRN HOLLI Lion      diphenhydrAMINE-zinc acetate   Topical BID PRN HOLLI Lion      escitalopram  20 mg Oral Daily HOLLI Lion      fluticasone  1 spray Each Nare Daily Brina Guillen MD      haloperidol  1 mg Oral Q6H PRN HOLLI Lion      haloperidol  2.5 mg  Oral Q4H PRN Max 4/day HOLLI Lion      haloperidol  5 mg Oral Q4H PRN Max 4/day HOLLI Lion      hydrocortisone   Topical 4x Daily PRN HOLLI Lion      hydrOXYzine HCL  100 mg Oral Q6H PRN Max 4/day [facility] Holter, DO      Or    LORazepam  2 mg Intramuscular Q6H PRN [facility] Holter, DO      hydrOXYzine HCL  100 mg Oral Q6H PRN Max 4/day HOLLI Lion      Or    LORazepam  2 mg Intramuscular Q6H PRN HOLLI Lion      hydrOXYzine HCL  25 mg Oral Q6H PRN Max 4/day [facility] Holter, DO      ibuprofen  600 mg Oral Q8H PRN HOLLI Lion      loratadine  10 mg Oral Daily Brina Guillen MD      melatonin  3 mg Oral HS [facility] Holter, DO      metFORMIN  500 mg Oral Daily With Breakfast HOLLI Lion      methocarbamol  500 mg Oral Q6H PRN HOLLI Lion      nicotine polacrilex  2 mg Oral Q4H PRN Bora Rosario MD      OLANZapine  5 mg Oral Q4H PRN Max 3/day [facility] Holter, DO      Or    OLANZapine  2.5 mg Intramuscular Q4H PRN Max 3/day [facility] Holter, DO      OLANZapine  5 mg Oral Q3H PRN Max 3/day [facility] Holter, DO      Or    OLANZapine  5 mg Intramuscular Q3H PRN Max 3/day [facility] Holter, DO      OLANZapine  2.5 mg Oral Q4H PRN Max 6/day [facility] Holter, DO      ondansetron  4 mg Oral Q6H PRN HOLLI Lion      polyethylene glycol  17 g Oral Daily HOLLI Lion      polyethylene glycol  17 g Oral Daily PRN [facility] Holter, DO      propranolol  10 mg Oral Q12H KAYLIE HOLLI Lion      risperiDONE  2 mg Oral HS Bora Rosario MD      senna-docusate sodium  1 tablet Oral Daily PRN [facility] Holter, DO      senna-docusate sodium  2 tablet Oral BID HOLLI Lion      traZODone  50 mg Oral HS PRN HOLLI Lion      white petrolatum-mineral oil   Topical TID PRN HOLLI Lion           Risks / Benefits of Treatment:    Risks, benefits, and possible side effects of medications explained to patient and patient verbalizes understanding and agreement for  treatment.    Counseling / Coordination of Care:    Patient's progress discussed with staff in treatment team meeting.  Medications, treatment progress and treatment plan reviewed with patient.    Note Share    This note was not shared with the patient due to reasonable likelihood of causing patient harm    HOLLI Torres 05/27/24

## 2024-05-27 NOTE — NURSING NOTE
Patient in bed at this time and had an uneventful evening.  No prn medications requested or required. Patient was medication compliant and reported no issues this evening. Cooperative with staff as well.

## 2024-05-27 NOTE — NURSING NOTE
Alert, cooperative and visible intermittently. No SI or HI noted. Pt states he has some  depression. Denies anxiety and pain. Continues to have auditory hallucinations. Attended exercise group, art group, and self care. Consumed 100% of breakfast and 100% of lunch. Took all medication without prompting. Pt assessed by Medical Provider and gave N.O metformin 500mg twice a day, hydrochlorothiazide 12.5mg daily.  Maintained on safe precautions without incident. Will continue to monitor progress and recovery program.

## 2024-05-28 PROCEDURE — 99232 SBSQ HOSP IP/OBS MODERATE 35: CPT | Performed by: PSYCHIATRY & NEUROLOGY

## 2024-05-28 RX ADMIN — NICOTINE POLACRILEX 2 MG: 2 GUM, CHEWING ORAL at 18:01

## 2024-05-28 RX ADMIN — MELATONIN TAB 3 MG 3 MG: 3 TAB at 21:21

## 2024-05-28 RX ADMIN — PROPRANOLOL HYDROCHLORIDE 10 MG: 10 TABLET ORAL at 08:10

## 2024-05-28 RX ADMIN — ESCITALOPRAM OXALATE 20 MG: 10 TABLET, FILM COATED ORAL at 08:10

## 2024-05-28 RX ADMIN — HYDROCHLOROTHIAZIDE 12.5 MG: 12.5 TABLET ORAL at 08:10

## 2024-05-28 RX ADMIN — METFORMIN HYDROCHLORIDE 500 MG: 500 TABLET, FILM COATED ORAL at 08:11

## 2024-05-28 RX ADMIN — NICOTINE POLACRILEX 2 MG: 2 GUM, CHEWING ORAL at 14:28

## 2024-05-28 RX ADMIN — SENNOSIDES AND DOCUSATE SODIUM 2 TABLET: 8.6; 5 TABLET ORAL at 08:11

## 2024-05-28 RX ADMIN — PROPRANOLOL HYDROCHLORIDE 10 MG: 10 TABLET ORAL at 21:21

## 2024-05-28 RX ADMIN — METFORMIN HYDROCHLORIDE 500 MG: 500 TABLET, FILM COATED ORAL at 17:32

## 2024-05-28 RX ADMIN — POLYETHYLENE GLYCOL 3350 17 G: 17 POWDER, FOR SOLUTION ORAL at 08:12

## 2024-05-28 RX ADMIN — RISPERIDONE 2 MG: 2 TABLET, FILM COATED ORAL at 21:22

## 2024-05-28 RX ADMIN — CYANOCOBALAMIN TAB 1000 MCG 1000 MCG: 1000 TAB at 08:11

## 2024-05-28 RX ADMIN — NICOTINE POLACRILEX 2 MG: 2 GUM, CHEWING ORAL at 08:12

## 2024-05-28 RX ADMIN — AMLODIPINE BESYLATE 5 MG: 5 TABLET ORAL at 08:10

## 2024-05-28 RX ADMIN — LORATADINE 10 MG: 10 TABLET ORAL at 08:10

## 2024-05-28 RX ADMIN — CLOZAPINE 500 MG: 100 TABLET ORAL at 21:22

## 2024-05-28 RX ADMIN — SENNOSIDES AND DOCUSATE SODIUM 2 TABLET: 8.6; 5 TABLET ORAL at 17:32

## 2024-05-28 NOTE — PROGRESS NOTES
05/28/24 0744   Team Meeting   Meeting Type Daily Rounds   Team Members Present   Team Members Present Physician;Nurse;;Other (Discipline and Name)   Patient/Family Present   Patient Present No   Patient's Family Present No     In attendance:  Dr. Alex Thomas, MD Dr. Jordan Holter, HOLLI Weiss, MIGUEL Alvarado, [facility]    Groups: 5/6    Pt's Metformin increased to 500mg. Pt has been loud at times and required redirection a few times. Pt is selectively social.

## 2024-05-28 NOTE — NURSING NOTE
Alberto maintained on ongoing SAFE precautions without incident on this shift. Awake,alert, cooperative and pleasant upon approach. Attended and participated in 6 out 7 groups today.. Continues to be compliant with meds and snack. Denies depression or anxiety.  No overt delusion or A/V/T Hallucination noted. No Behavior noted

## 2024-05-28 NOTE — NURSING NOTE
Alberto maintain on ongoing SAFE precaution without incident on this shift. Observed resting in bed, respiration even and unlabored. Continuous Q 7 minutes check implemented thru the night. No behavioral noted

## 2024-05-28 NOTE — PROGRESS NOTES
Psychiatry Progress Note [facility]    Alberto Berumen 28 y.o. male MRN: [id number]  Unit/Bed#: [facility] 101-02 Encounter: [id number]  Code Status: Level 1 - Full Code    PCP: Joce Juan MD    Date of Admission:  3/29/2024 2008   Date of Service:  05/28/24    Patient Active Problem List   Diagnosis    GERD (gastroesophageal reflux disease)    Medical clearance for psychiatric admission    Schizoaffective disorder, bipolar type (HCC)    Tobacco abuse    T wave inversion in EKG    Syringoma    Chronic idiopathic constipation    Vitamin B 12 deficiency    Vitamin D deficiency    Confluent and reticulate papillomatosis    Class 2 obesity in adult    Primary hypertension    Elevated hemoglobin A1c    Bilateral lower extremity edema     Review of systems: unremarkable bt started on HCTZ and metformin increased by medical for B/L pitting edema on legs, also on Claritin and Flonase   diagnosis: Schizoaffective bipolar  assessment  Overall Status: again with same threatening voices to kill him and his family, approved for 6 more ECTs for maintenance and tells me voices make him feel sad and have suicidal thoughts but with no plans   Certification Statement: The patient will continue to require additional inpatient hospital stay due to ongoing voices that are threatening to kill him and his family lack of response to medications and ECT so far.     Medications: Clozapine 500 mg at bedtime, propranolol 10 mg every 12 hours as as needed for anxiety, Risperdal 2 mg at bedtime Lexapro 20 mg once a day, atropine eyedrops sublingual for drooling of saliva and senna 2 tablets twice a day for constipation  All medications reviewed and recommend they be continued for symptom management  side effects from treatment: None reported  Medication changes   None today  Medication education   Risks side effects benefits and precautions of medications discussed with patient and he did verbalize an understanding about  risks for metabolic syndrome from being on neuroleptics and is form tardive dyskinesia etc. and special precautions about being on clozapine   Understanding of medications: Has some understanding   Justification for dual anti-psychotics: Not applicable    Non-pharmacological treatments  Continue with individual, group, milieu and occupational therapy using recovery principles and psycho-education about accepting illness and the need for treatment.   to contact aunt and explore ACT team referral which he never had  ECT to be continued  weekly for maintenance starting on 4/12/2024 which ended as of yesterday, plan is to do 6 more q 2 weekly ECTs, approved by insurance,  as he insists it is helpful so far in controlling the voices  Refuses to see a dietician and agrees to do more exercises as he is about 100 lbs overweight     Safety  Safety and communication plan established to target dynamic risk factors discussed above.    Discharge Plan   Back to his aunt with an ACT team    Interval Progress   No significant changes reported remaining withdrawn and isolated with flat and constricted affect but more visible interacting with select peers.  Still believes ECT has some definitely helped him to stay out of his head as he now can live with the voices and distract himself and he is still threatening to kill him and his family and not commanding claiming he is hearing them daily for more than 2 years all the time.  He has not been aggressive or agitated or threatening or self-abusive and is polite friendly pleasant when approached.  Continues to have a flat affect interacting superficial with select peers and still dressed in hospital clothing.  Initially found laying in bed at other times found outside interacting with select peers  I Acceptance by patient: Accepting  Hopefulness in recovery: Living with his aunt  Involved in reintegration process: Talking to his aunt and sister  Trusting in relationship  with psychiatrist: Trusting  Sleep: Good  Appetite: Good  Compliance with Medications: Good  Group attendance: Some of the groups  Significant events: Improving slowly but still asking for more ECTs    Mental Status Exam  Appearance: age appropriate, improved grooming, looks older than stated age, overweight, with hair in dreadlocks casually dressed with a clean shaven face, fairly groomed with good eye contact attended team meeting today wearing a hooded sweat shirt   behavior: cooperative, mildly anxious, evasive, gesturing, slow responses.   speech: normal rate, normal volume, normal pitch  Mood: dysphoric, anxious, continues to report feeling good with the ECT   affect: constricted, inappropriate, mood-congruent  Thought Process: organized, logical, coherent, goal directed, linear, decreased rate of thoughts, slowing of thoughts, negative thinking, impaired abstract reasoning, concrete  Thought Content: paranoid ideation, some paranoia, grandiose ideas, intrusive thoughts, preoccupied, chronic, continues to report paranoia about people threatening to kill him and his family because of the voices.  He believes ECT has helped so that he is not much in his head.  No other delusions elicited.  No current suicidal or homicidal thoughts and no plans verbalized but today reports having passive death wishes because of voices with no plans and able to CFS .  No phobias obsessions compulsions or distorted body perceptions reported.  Preoccupied with wanting to get ECTs more often despite reminding him that it may impair his memory and finally he agreed to such as it is to every 2 weeks  Perceptual Disturbances: still hearing threatening voices to kill him and his family not commanding   Hx Risk Factors: chronic psychiatric problems, chronic anxiety symptoms, chronic psychotic symptoms,    Sensorium: oriented x 3 spheres and situation   Cognition: recent and remote memory grossly intact  Consciousness: alert and  awake  Attention: attention span and concentration are age appropriate  Intellect: appears to be of average intelligence  Insight: intact  Judgement: intact  Motor Activity: no abnormal movements     Vitals  Temp:  [97.8 °F (36.6 °C)-97.9 °F (36.6 °C)] 97.9 °F (36.6 °C)  HR:  [101-110] 107  Resp:  [18-19] 18  BP: (137-165)/(86-95) 165/89  SpO2:  [97 %] 97 %  No intake or output data in the 24 hours ending 05/28/24 0409              Lab Results: All Labs For Current Hospital Admission Reviewed     Current Facility-Administered Medications   Medication Dose Route Frequency Provider Last Rate    acetaminophen  650 mg Oral Q4H PRN Jordan C Holter, DO      acetaminophen  650 mg Oral Q6H PRN HOLLI Lion      aluminum-magnesium hydroxide-simethicone  30 mL Oral Q4H PRN Jordan C Holter,       amLODIPine  5 mg Oral Daily STEVE LionNP      Artificial Tears  1 drop Both Eyes Q3H PRN Jordan C Holter, DO      atropine  1 drop Sublingual HS STEVE LionNP      atropine  1 drop Sublingual Daily PRN STEVE LionNP      haloperidol lactate  2.5 mg Intramuscular Q4H PRN Max 4/day STEVE LionNP      And    LORazepam  1 mg Intramuscular Q4H PRN Max 4/day STEVE LionNP      And    benztropine  0.5 mg Intramuscular Q4H PRN Max 4/day Eveline Hunt, CRNP      benztropine  1 mg Intramuscular Q4H PRN Max 6/day Jordan C Holter, DO      haloperidol lactate  5 mg Intramuscular Q4H PRN Max 4/day Evelien Hunt CRNP      And    LORazepam  2 mg Intramuscular Q4H PRN Max 4/day STEVE LionNP      And    benztropine  1 mg Intramuscular Q4H PRN Max 4/day STEVE LionNP      benztropine  1 mg Oral Q4H PRN Max 6/day Evelinevipul Hunt, CRNP      benztropine  1 mg Oral Q4H PRN Max 6/day Jordan C Holter,       bisacodyl  10 mg Rectal Daily PRN HOLLI Lion      cloZAPine  500 mg Oral HS Bora Rosario MD      cyanocobalamin  1,000 mcg Oral Daily HOLLI Galvan      hydrOXYzine HCL  50 mg Oral Q6H PRN  Max 4/day Ion  Holter, DO      Or    diphenhydrAMINE  50 mg Intramuscular Q6H PRN Ion  Holter, DO      hydrOXYzine HCL  50 mg Oral Q6H PRN Max 4/day HOLLI Lion      Or    diphenhydrAMINE  50 mg Intramuscular Q6H PRN HOLLI Lion      diphenhydrAMINE-zinc acetate   Topical BID PRN HOLLI Lion      escitalopram  20 mg Oral Daily HOLLI Lion      fluticasone  1 spray Each Nare Daily Brina Guillen MD      haloperidol  1 mg Oral Q6H PRN HOLLI Lion      haloperidol  2.5 mg Oral Q4H PRN Max 4/day HOLLI Lion      haloperidol  5 mg Oral Q4H PRN Max 4/day HOLLI Lion      hydroCHLOROthiazide  12.5 mg Oral Daily HOLLI Galvan      hydrocortisone   Topical 4x Daily PRN HOLLI Lion      hydrOXYzine HCL  100 mg Oral Q6H PRN Max 4/day Ion  Cresencioter, DO      Or    LORazepam  2 mg Intramuscular Q6H PRN Ion  Holter, DO      hydrOXYzine HCL  100 mg Oral Q6H PRN Max 4/day HOLLI Lion      Or    LORazepam  2 mg Intramuscular Q6H PRN HOLLI Lion      hydrOXYzine HCL  25 mg Oral Q6H PRN Max 4/day Ion  Holter, DO      ibuprofen  600 mg Oral Q8H PRN HOLLI Lion      loratadine  10 mg Oral Daily Brina Guillen MD      melatonin  3 mg Oral HS Ion  Holter, DO      metFORMIN  500 mg Oral BID With Meals HOLLI Galvan      methocarbamol  500 mg Oral Q6H PRN HOLLI Lion      nicotine polacrilex  2 mg Oral Q4H PRN Bora Rosario MD      OLANZapine  5 mg Oral Q4H PRN Max 3/day [facility] Holter, DO      Or    OLANZapine  2.5 mg Intramuscular Q4H PRN Max 3/day [facility] Holter, DO      OLANZapine  5 mg Oral Q3H PRN Max 3/day [facility] Holter, DO      Or    OLANZapine  5 mg Intramuscular Q3H PRN Max 3/day [facility] Holter, DO      OLANZapine  2.5 mg Oral Q4H PRN Max 6/day Ion C Holter, DO      ondansetron  4 mg Oral Q6H PRN HOLLI Lion      polyethylene glycol  17 g Oral Daily HOLLI Lion glycol  17  g Oral Daily PRN Jordan C Holter,       propranolol  10 mg Oral Q12H [facility] HOLLI Loin      risperiDONE  2 mg Oral HS Bora Rosario MD      senna-docusate sodium  1 tablet Oral Daily PRN Jordan C Holter, DO      senna-docusate sodium  2 tablet Oral BID HOLLI Lion      traZODone  50 mg Oral HS PRN HOLLI Lion      white petrolatum-mineral oil   Topical TID PRN HOLLI Lion         Counseling / Coordination of Care: Total floor / unit time spent today 15 minutes. Greater than 50% of total time was spent with the patient and / or family counseling and / or somewhat receptive to supportive listening and teaching positive coping skills to deal with symptom mangement.     Patient's Rights, confidentiality and exceptions to confidentiality, use of automated medical record, Behavioral Health Services staff access to medical record, and consent to treatment reviewed.    This note has been dictated and hence there may be problems with punctuation, spelling and formatting and if anyone has any concerns please address them to Dr. Rosario   This note is not shared with patient due to potential for making patient's condition worse by knowing the content of the note.

## 2024-05-28 NOTE — NURSING NOTE
Patient is visible, social with peers and cooperative with care. Pt reports voices, not commanding and they're manageable. Pt takes medication without issue. Offers no complaints.

## 2024-05-28 NOTE — PROGRESS NOTES
05/28/24 1057   Team Meeting   Meeting Type Tx Team Meeting   Initial Conference Date 05/28/24   Next Conference Date 06/11/24   Team Members Present   Team Members Present Physician;Nurse;;Other (Discipline and Name)   Physician Team Member Abraham   Nursing Team Member Claudia   Social Work Team Member Jenny ORTEGA   Other (Discipline and Name) Sharlene- CMP-[facility]; Geronimo - [facility]   Patient/Family Present   Patient Present Yes   Patient's Family Present No   OTHER   Team Meeting - Additional Comments Patient attended his treatment team meeting. Pt reports ongoing SI, depression and thoughts of harming himself. Pt denied a plan or immediate urge to follow through. Pt reports ongoing anxiety. Pt will be able to obtain his cell phone for a short time today to pull numbers off of it for family members. Pt will continue ECT maintenance and the current discharge disposition will be home with his aunt eventually.

## 2024-05-28 NOTE — PLAN OF CARE
Problem: Depression  Goal: Verbalize thoughts and feelings  Description: Interventions:  - Assess and re-assess patient's level of risk   - Engage patient in 1:1 interactions, daily, for a minimum of 15 minutes   - Encourage patient to express feelings, fears, frustrations, hopes   Outcome: Progressing     Problem: Alteration in Orientation  Goal: Interact with staff daily  Description: Interventions:  - Assess and re-assess patient's level of orientation  - Engage patient in 1 on 1 interactions, daily, for a minimum of 15 minutes   - Establish rapport/trust with patient   Outcome: Progressing     Problem: Ineffective Coping  Goal: Participates in unit activities  Description: Interventions:  - Provide therapeutic environment   - Provide required programming   - Redirect inappropriate behaviors   Outcome: Progressing

## 2024-05-28 NOTE — PROGRESS NOTES
05/28/24 1059   Team Meeting   Meeting Type Tx Team Meeting   Initial Conference Date 05/28/24   Next Conference Date 06/27/24   Team Members Present   Team Members Present Physician;Nurse;   Physician Team Member Abraham   Nursing Team Member Claudia   Social Work Team Member Jenny ORTEGA   Patient/Family Present   Patient Present Yes   Patient's Family Present No     Patient participated in review of his treatment plan. Pt denied any questions or concerns at this time. Copy of tx plan placed in pt's chart.

## 2024-05-28 NOTE — NURSING NOTE
"Alberto was visible this evening. He had a virtual visit with his brother and several phone calls. He is social with select peers   He states he does not have any depression or anxiety  but he does have auditory hallucinations which continue and they are the same; they say I am going to harm your family\"   (This is the same thing the voices have been saying)  During nursing group he spoke about his numerous hospitalizations and how it is such a challenge with the voices he has been haring for so many years  He says his allergies are very bad today and wants to get something from his doctor for them. I said I will pass it along in report    "

## 2024-05-29 LAB
ALBUMIN SERPL BCG-MCNC: 4.2 G/DL (ref 3.5–5)
ALP SERPL-CCNC: 68 U/L (ref 34–104)
ALT SERPL W P-5'-P-CCNC: 60 U/L (ref 7–52)
ANION GAP SERPL CALCULATED.3IONS-SCNC: 10 MMOL/L (ref 4–13)
AST SERPL W P-5'-P-CCNC: 29 U/L (ref 13–39)
ATRIAL RATE: 100 BPM
BASOPHILS # BLD AUTO: 0.07 THOUSANDS/ÂΜL (ref 0–0.1)
BASOPHILS NFR BLD AUTO: 1 % (ref 0–1)
BILIRUB SERPL-MCNC: 0.36 MG/DL (ref 0.2–1)
BNP SERPL-MCNC: 5 PG/ML (ref 0–100)
BUN SERPL-MCNC: 11 MG/DL (ref 5–25)
CALCIUM SERPL-MCNC: 9.6 MG/DL (ref 8.4–10.2)
CARDIAC TROPONIN I PNL SERPL HS: <2 NG/L (ref 8–18)
CHLORIDE SERPL-SCNC: 100 MMOL/L (ref 96–108)
CK SERPL-CCNC: 195 U/L (ref 39–308)
CLOZAPINE SERPL-MCNC: 782 NG/ML (ref 350–900)
CO2 SERPL-SCNC: 25 MMOL/L (ref 21–32)
CREAT SERPL-MCNC: 0.83 MG/DL (ref 0.6–1.3)
EOSINOPHIL # BLD AUTO: 0.49 THOUSAND/ÂΜL (ref 0–0.61)
EOSINOPHIL NFR BLD AUTO: 5 % (ref 0–6)
ERYTHROCYTE [DISTWIDTH] IN BLOOD BY AUTOMATED COUNT: 15.1 % (ref 11.6–15.1)
GFR SERPL CREATININE-BSD FRML MDRD: 120 ML/MIN/1.73SQ M
GLUCOSE P FAST SERPL-MCNC: 103 MG/DL (ref 65–99)
GLUCOSE SERPL-MCNC: 103 MG/DL (ref 65–140)
HCT VFR BLD AUTO: 41.7 % (ref 36.5–49.3)
HGB BLD-MCNC: 12.4 G/DL (ref 12–17)
IMM GRANULOCYTES # BLD AUTO: 0.07 THOUSAND/UL (ref 0–0.2)
IMM GRANULOCYTES NFR BLD AUTO: 1 % (ref 0–2)
LYMPHOCYTES # BLD AUTO: 2.86 THOUSANDS/ÂΜL (ref 0.6–4.47)
LYMPHOCYTES NFR BLD AUTO: 30 % (ref 14–44)
MCH RBC QN AUTO: 23.6 PG (ref 26.8–34.3)
MCHC RBC AUTO-ENTMCNC: 29.7 G/DL (ref 31.4–37.4)
MCV RBC AUTO: 79 FL (ref 82–98)
MONOCYTES # BLD AUTO: 1.05 THOUSAND/ÂΜL (ref 0.17–1.22)
MONOCYTES NFR BLD AUTO: 11 % (ref 4–12)
NEUTROPHILS # BLD AUTO: 5.09 THOUSANDS/ÂΜL (ref 1.85–7.62)
NEUTS SEG NFR BLD AUTO: 52 % (ref 43–75)
NRBC BLD AUTO-RTO: 0 /100 WBCS
P AXIS: 37 DEGREES
PLATELET # BLD AUTO: 255 THOUSANDS/UL (ref 149–390)
PMV BLD AUTO: 10.2 FL (ref 8.9–12.7)
POTASSIUM SERPL-SCNC: 4.6 MMOL/L (ref 3.5–5.3)
PR INTERVAL: 140 MS
PROLACTIN SERPL-MCNC: 27.4 NG/ML
PROT SERPL-MCNC: 7.6 G/DL (ref 6.4–8.4)
QRS AXIS: 51 DEGREES
QRSD INTERVAL: 90 MS
QT INTERVAL: 350 MS
QTC INTERVAL: 451 MS
RBC # BLD AUTO: 5.25 MILLION/UL (ref 3.88–5.62)
SODIUM SERPL-SCNC: 135 MMOL/L (ref 135–147)
T WAVE AXIS: -33 DEGREES
VENTRICULAR RATE: 100 BPM
WBC # BLD AUTO: 9.63 THOUSAND/UL (ref 4.31–10.16)

## 2024-05-29 PROCEDURE — 82550 ASSAY OF CK (CPK): CPT | Performed by: PSYCHIATRY & NEUROLOGY

## 2024-05-29 PROCEDURE — 80159 DRUG ASSAY CLOZAPINE: CPT | Performed by: PSYCHIATRY & NEUROLOGY

## 2024-05-29 PROCEDURE — 99232 SBSQ HOSP IP/OBS MODERATE 35: CPT | Performed by: PSYCHIATRY & NEUROLOGY

## 2024-05-29 PROCEDURE — 80053 COMPREHEN METABOLIC PANEL: CPT | Performed by: PSYCHIATRY & NEUROLOGY

## 2024-05-29 PROCEDURE — 93010 ELECTROCARDIOGRAM REPORT: CPT

## 2024-05-29 PROCEDURE — 83880 ASSAY OF NATRIURETIC PEPTIDE: CPT | Performed by: PSYCHIATRY & NEUROLOGY

## 2024-05-29 PROCEDURE — 93005 ELECTROCARDIOGRAM TRACING: CPT

## 2024-05-29 PROCEDURE — 84484 ASSAY OF TROPONIN QUANT: CPT | Performed by: PSYCHIATRY & NEUROLOGY

## 2024-05-29 PROCEDURE — 84146 ASSAY OF PROLACTIN: CPT | Performed by: PSYCHIATRY & NEUROLOGY

## 2024-05-29 PROCEDURE — 85025 COMPLETE CBC W/AUTO DIFF WBC: CPT | Performed by: PSYCHIATRY & NEUROLOGY

## 2024-05-29 RX ADMIN — ESCITALOPRAM OXALATE 20 MG: 10 TABLET, FILM COATED ORAL at 08:25

## 2024-05-29 RX ADMIN — NICOTINE POLACRILEX 2 MG: 2 GUM, CHEWING ORAL at 21:15

## 2024-05-29 RX ADMIN — FLUTICASONE PROPIONATE 1 SPRAY: 50 SPRAY, METERED NASAL at 08:28

## 2024-05-29 RX ADMIN — METFORMIN HYDROCHLORIDE 500 MG: 500 TABLET, FILM COATED ORAL at 08:26

## 2024-05-29 RX ADMIN — HYDROCHLOROTHIAZIDE 12.5 MG: 12.5 TABLET ORAL at 08:25

## 2024-05-29 RX ADMIN — NICOTINE POLACRILEX 2 MG: 2 GUM, CHEWING ORAL at 17:31

## 2024-05-29 RX ADMIN — METFORMIN HYDROCHLORIDE 500 MG: 500 TABLET, FILM COATED ORAL at 17:31

## 2024-05-29 RX ADMIN — MELATONIN TAB 3 MG 3 MG: 3 TAB at 21:15

## 2024-05-29 RX ADMIN — SENNOSIDES AND DOCUSATE SODIUM 2 TABLET: 8.6; 5 TABLET ORAL at 08:26

## 2024-05-29 RX ADMIN — NICOTINE POLACRILEX 2 MG: 2 GUM, CHEWING ORAL at 12:58

## 2024-05-29 RX ADMIN — PROPRANOLOL HYDROCHLORIDE 10 MG: 10 TABLET ORAL at 21:15

## 2024-05-29 RX ADMIN — LORATADINE 10 MG: 10 TABLET ORAL at 08:30

## 2024-05-29 RX ADMIN — RISPERIDONE 2 MG: 2 TABLET, FILM COATED ORAL at 21:15

## 2024-05-29 RX ADMIN — CYANOCOBALAMIN TAB 1000 MCG 1000 MCG: 1000 TAB at 08:26

## 2024-05-29 RX ADMIN — POLYETHYLENE GLYCOL 3350 17 G: 17 POWDER, FOR SOLUTION ORAL at 08:25

## 2024-05-29 RX ADMIN — AMLODIPINE BESYLATE 5 MG: 5 TABLET ORAL at 08:26

## 2024-05-29 RX ADMIN — SENNOSIDES AND DOCUSATE SODIUM 2 TABLET: 8.6; 5 TABLET ORAL at 17:31

## 2024-05-29 RX ADMIN — CLOZAPINE 500 MG: 100 TABLET ORAL at 21:15

## 2024-05-29 RX ADMIN — PROPRANOLOL HYDROCHLORIDE 10 MG: 10 TABLET ORAL at 08:26

## 2024-05-29 RX ADMIN — NICOTINE POLACRILEX 2 MG: 2 GUM, CHEWING ORAL at 08:25

## 2024-05-29 NOTE — NURSING NOTE
Labs to be obtain this morning BNP; CBC; CK; Clozapine: CMP; Troponin: Prolactin obtain with pending results.

## 2024-05-29 NOTE — NURSING NOTE
Patient is social with staff and peers, visible and attending groups. Pt is pleasant with interactions with staff, takes all medications without issue.Pt reports no s/s today.

## 2024-05-29 NOTE — NURSING NOTE
New order acknowledge and noted: Labs to be obtain this morning BNP; CBC; CK; Clozapine: CMP; Troponin: Prolactin

## 2024-05-29 NOTE — SOCIAL WORK
Pt requested to have his friend come and visit in person. SW reiterated to pt that in person visits are for immediate family only because they are meant to be therapeutic and part of the discharge planning process. SW offered to set up additional virtual visits for pt.

## 2024-05-29 NOTE — PROGRESS NOTES
Psychiatry Progress Note [facility]    Alberto Berumen 28 y.o. male MRN: [id number]  Unit/Bed#: [facility] 101-02 Encounter: [id number]  Code Status: Level 1 - Full Code    PCP: Joce Juan MD    Date of Admission:  3/29/2024 2008   Date of Service:  05/29/24    Patient Active Problem List   Diagnosis    GERD (gastroesophageal reflux disease)    Medical clearance for psychiatric admission    Schizoaffective disorder, bipolar type (HCC)    Tobacco abuse    T wave inversion in EKG    Syringoma    Chronic idiopathic constipation    Vitamin B 12 deficiency    Vitamin D deficiency    Confluent and reticulate papillomatosis    Class 2 obesity in adult    Primary hypertension    Elevated hemoglobin A1c    Bilateral lower extremity edema     Review of systems: Unremarkable   diagnosis: Schizoaffective bipolar  assessment  Overall Status: Continues to report threatening voices to kill him and his family and he is awaiting for maintenance ECTs that are approved for 6 more and that will be given every other week.  Claims voices making him feel suicidal but willing to contract for safety and the voices are not commanding but threatening  Certification Statement: The patient will continue to require additional inpatient hospital stay due to ongoing voices that are threatening to kill him and his family lack of response to medications and ECT so far.     Medications: Clozapine 500 mg at bedtime, propranolol 10 mg every 12 hours as as needed for anxiety, Risperdal 2 mg at bedtime Lexapro 20 mg once a day, atropine eyedrops sublingual for drooling of saliva and senna 2 tablets twice a day for constipation  All medications reviewed and recommend they be continued for symptom management  side effects from treatment: None reported  Medication changes   None today  Medication education   Risks side effects benefits and precautions of medications discussed with patient and he did verbalize an understanding about  risks for metabolic syndrome from being on neuroleptics and is form tardive dyskinesia etc. and special precautions about being on clozapine   Understanding of medications: Has some understanding   Justification for dual anti-psychotics: Not applicable    Non-pharmacological treatments  Continue with individual, group, milieu and occupational therapy using recovery principles and psycho-education about accepting illness and the need for treatment.   to contact aunt and explore ACT team referral which he never had  ECT to be continued  weekly for maintenance starting on 4/12/2024 which ended as of yesterday, plan is to do 6 more q 2 weekly ECTs, approved by insurance,  as he insists it is helpful so far in controlling the voices  Refuses to see a dietician and agrees to do more exercises as he is about 100 lbs overweight   We will check a prolactin level because of gynecomastia to see if it is from Risperdal    Safety  Safety and communication plan established to target dynamic risk factors discussed above.    Discharge Plan   Back to his aunt with an ACT team    Interval Progress   No significant changes reported remaining withdrawn and isolated with a flat and constricted affect interacting with select peers.  Continues to believe ECTs have helped him stay out of his head and are still threatening to kill him and his family but not at all commanding and he has been dealing with for the last 2 years.  This makes him feel depressed and suicidal but without any intent or plans to act on the and comes across as internally preoccupied withdrawn with a constricted affect.  Does interact with select peers.  Still with hyperactive constricted affect superficial interaction dressed in hospital clothing most of the time laying on bed and interacting with select peers.  Not aggressive or agitated or threatening or demanding or self-abusive    I Acceptance by patient: Accepting  Hopefulness in recovery: Living  with his aunt  Involved in reintegration process: Talking to his aunt and sister  Trusting in relationship with psychiatrist: Trusting  Sleep: Good  Appetite: Good  Compliance with Medications: Good  Group attendance: Some of the groups  Significant events: Improving and again asking for more ECTs    Mental Status Exam  Appearance: age appropriate, improved grooming, looks older than stated age, overweight, with hair in dreadlocks casually dressed with a clean shaven face, fairly groomed with good eye contact found resting on bed but did get up without any hesitation still dressed in hospital clothing   behavior: cooperative, mildly anxious, evasive, gesturing, slow responses.   speech: normal rate, normal volume, normal pitch  Mood: dysphoric, anxious, continues to report feeling good with the ECT   affect: constricted, inappropriate, mood-congruent  Thought Process: organized, logical, coherent, goal directed, linear, decreased rate of thoughts, slowing of thoughts, negative thinking, impaired abstract reasoning, concrete  Thought Content: paranoid ideation, some paranoia, grandiose ideas, intrusive thoughts, preoccupied, chronic, continues to report paranoia about people threatening to kill him and his family because of the voices.  He believes ECT has helped so that he is not much in his head.  No other delusions elicited.  No current suicidal or homicidal thoughts and no plans verbalized but today reports having passive death wishes because of voices with no plans and able to CFS .  No phobias obsessions compulsions or distorted body perceptions reported.  Preoccupied with wanting to get ECTs more often despite reminding him that it may impair his memory and finally he agreed to such as it is to every 2 weeks  Perceptual Disturbances: Continues to report threatening voices to kill him and his family but not commanding   Hx Risk Factors: chronic psychiatric problems, chronic anxiety symptoms, chronic psychotic  symptoms,    Sensorium: Oriented x 3 spheres and situation  Cognition: recent and remote memory grossly intact  Consciousness: alert and awake  Attention: attention span and concentration are age appropriate  Intellect: appears to be of average intelligence  Insight: intact  Judgement: intact  Motor Activity: no abnormal movements     Vitals  Temp:  [98 °F (36.7 °C)-98.2 °F (36.8 °C)] 98 °F (36.7 °C)  HR:  [] 110  Resp:  [18] 18  BP: (132-150)/(72-91) 150/85  SpO2:  [98 %-99 %] 98 %  No intake or output data in the 24 hours ending 05/29/24 0543              Lab Results: All Labs For Current Hospital Admission Reviewed     Current Facility-Administered Medications   Medication Dose Route Frequency Provider Last Rate    acetaminophen  650 mg Oral Q4H PRN Jordan C Holter, DO      acetaminophen  650 mg Oral Q6H PRN HOLLI Lion      aluminum-magnesium hydroxide-simethicone  30 mL Oral Q4H PRN Jordan C Holter,       amLODIPine  5 mg Oral Daily HOLLI Lion      Artificial Tears  1 drop Both Eyes Q3H PRN Jordan C Holter, DO      atropine  1 drop Sublingual HS HOLLI Lion      atropine  1 drop Sublingual Daily PRN HOLLI Lion      haloperidol lactate  2.5 mg Intramuscular Q4H PRN Max 4/day STEVE LionNP      And    LORazepam  1 mg Intramuscular Q4H PRN Max 4/day STEVE LionNP      And    benztropine  0.5 mg Intramuscular Q4H PRN Max 4/day HOLLI Lion      benztropine  1 mg Intramuscular Q4H PRN Max 6/day Jordan C Holter,       haloperidol lactate  5 mg Intramuscular Q4H PRN Max 4/day HOLLI Lion      And    LORazepam  2 mg Intramuscular Q4H PRN Max 4/day HOLLI Lion      And    benztropine  1 mg Intramuscular Q4H PRN Max 4/day HOLLI Lion      benztropine  1 mg Oral Q4H PRN Max 6/day HOLLI Lion      benztropine  1 mg Oral Q4H PRN Max 6/day Jordan C Holter,       bisacodyl  10 mg Rectal Daily PRN HOLLI Lion      cloZAPine  500 mg  Oral HS Bora Rosario MD      cyanocobalamin  1,000 mcg Oral Daily HOLLI Galvan      hydrOXYzine HCL  50 mg Oral Q6H PRN Max 4/day [facility] Holter, DO      Or    diphenhydrAMINE  50 mg Intramuscular Q6H PRN [facility] Holter, DO      hydrOXYzine HCL  50 mg Oral Q6H PRN Max 4/day HOLLI Lion      Or    diphenhydrAMINE  50 mg Intramuscular Q6H PRN HOLLI Lion      diphenhydrAMINE-zinc acetate   Topical BID PRN HOLLI Lion      escitalopram  20 mg Oral Daily HOLLI Lion      fluticasone  1 spray Each Nare Daily Brina Guillen MD      haloperidol  1 mg Oral Q6H PRN HOLLI Lion      haloperidol  2.5 mg Oral Q4H PRN Max 4/day HOLLI Lion      haloperidol  5 mg Oral Q4H PRN Max 4/day HOLLI Lion      hydroCHLOROthiazide  12.5 mg Oral Daily HOLLI Galvan      hydrocortisone   Topical 4x Daily PRN HOLLI Lion      hydrOXYzine HCL  100 mg Oral Q6H PRN Max 4/day [facility] Holter, DO      Or    LORazepam  2 mg Intramuscular Q6H PRN [facility] Holter, DO      hydrOXYzine HCL  100 mg Oral Q6H PRN Max 4/day HOLLI Lion      Or    LORazepam  2 mg Intramuscular Q6H PRN HOLLI Lion      hydrOXYzine HCL  25 mg Oral Q6H PRN Max 4/day [facility] Holter, DO      ibuprofen  600 mg Oral Q8H PRN HOLLI Lion      loratadine  10 mg Oral Daily Brina Guillen MD      melatonin  3 mg Oral HS [facility] Holter, DO      metFORMIN  500 mg Oral BID With Meals HOLLI Galvan      methocarbamol  500 mg Oral Q6H PRN HOLLI Lion      nicotine polacrilex  2 mg Oral Q4H PRN Bora Rosario MD      OLANZapine  5 mg Oral Q4H PRN Max 3/day [facility] Holter, DO      Or    OLANZapine  2.5 mg Intramuscular Q4H PRN Max 3/day [facility] Holter, DO      OLANZapine  5 mg Oral Q3H PRN Max 3/day Ion C Holter, DO      Or    OLANZapine  5 mg Intramuscular Q3H PRN Max 3/day Jordan C Holter, DO      OLANZapine  2.5 mg Oral Q4H PRN Max 6/day Jordan C Holter, DO       ondansetron  4 mg Oral Q6H PRN HOLLI Lion      polyethylene glycol  17 g Oral Daily HOLLI Lion      polyethylene glycol  17 g Oral Daily PRN Jordan C Holter,       propranolol  10 mg Oral Q12H KAYLIE HOLLI Lion      risperiDONE  2 mg Oral HS Bora Rosario MD      senna-docusate sodium  1 tablet Oral Daily PRN Jordan C Holter,       senna-docusate sodium  2 tablet Oral BID HOLLI Lion      traZODone  50 mg Oral HS PRN HOLLI Lion      white petrolatum-mineral oil   Topical TID PRN HOLLI Lion         Counseling / Coordination of Care: Total floor / unit time spent today 15 minutes. Greater than 50% of total time was spent with the patient and / or family counseling and / or somewhat receptive to supportive listening and teaching positive coping skills to deal with symptom mangement.     Patient's Rights, confidentiality and exceptions to confidentiality, use of automated medical record, Behavior[facility] Services staff access to medical record, and consent to treatment reviewed.    This note has been dictated and hence there may be problems with punctuation, spelling and formatting and if anyone has any concerns please address them to Dr. Rosario   This note is not shared with patient due to potential for making patient's condition worse by knowing the content of the note.

## 2024-05-29 NOTE — NURSING NOTE
Pt is visible on the unit and social with select peers. Consumed 100% of dinner. Took medications without incidence but refused atropine drops. Pt is pleasant and cooperative. Attended most groups. Denies all psych symptoms. No behavioral issues. Pt offers no concerns or complaints. Pt has had a trend of high heart rates, denies s/s. Continuous safety checks maintained.

## 2024-05-29 NOTE — PLAN OF CARE
Problem: Alteration in Thoughts and Perception  Goal: Verbalize thoughts and feelings  Description: Interventions:  - Promote a nonjudgmental and trusting relationship with the patient through active listening and therapeutic communication  - Assess patient's level of functioning, behavior and potential for risk  - Engage patient in 1 on 1 interactions  - Encourage patient to express fears, feelings, frustrations, and discuss symptoms    - [place] patient to reality, help patient recognize reality-based thinking   - Administer medications as ordered and assess for potential side effects  - Provide the patient education related to the signs and symptoms of the illness and desired effects of prescribed medications  Outcome: Progressing  Goal: Refrain from acting on delusional thinking/internal stimuli  Description: Interventions:  - Monitor patient closely, per order   - Utilize least restrictive measures   - Set reasonable limits, give positive feedback for acceptable   - Administer medications as ordered and monitor of potential side effects  Outcome: Progressing  Goal: Agree to be compliant with medication regime, as prescribed and report medication side effects  Description: Interventions:  - Offer appropriate PRN medication and supervise ingestion; conduct AIMS, as needed   Outcome: Progressing  Goal: Attend and participate in unit activities, including therapeutic, recreational, and educational groups  Description: Interventions:  -Encourage Visitation and family involvement in care  Outcome: Progressing  Goal: Recognize dysfunctional thoughts, communicate reality-based thoughts at the time of discharge  Description: Interventions:  - Provide medication and psycho-education to assist patient in compliance and developing insight into his/her illness   Outcome: Progressing  Goal: Complete daily ADLs, including personal hygiene independently, as able  Description: Interventions:  - Observe, teach, and assist  patient with ADLS  - Monitor and promote a balance of rest/activity, with adequate nutrition and elimination   Outcome: Progressing     Problem: Ineffective Coping  Goal: Identifies ineffective coping skills  Outcome: Progressing  Goal: Identifies healthy coping skills  Outcome: Progressing  Goal: Demonstrates healthy coping skills  Outcome: Progressing  Goal: Participates in unit activities  Description: Interventions:  - Provide therapeutic environment   - Provide required programming   - Redirect inappropriate behaviors   Outcome: Progressing     Problem: Depression  Goal: Treatment Goal: Demonstrate behavioral control of depressive symptoms, verbalize feelings of improved mood/affect, and adopt new coping skills prior to discharge  Outcome: Progressing  Goal: Verbalize thoughts and feelings  Description: Interventions:  - Assess and re-assess patient's level of risk   - Engage patient in 1:1 interactions, daily, for a minimum of 15 minutes   - Encourage patient to express feelings, fears, frustrations, hopes   Outcome: Progressing  Goal: Refrain from harming self  Description: Interventions:  - Monitor patient closely, per order   - Supervise medication ingestion, monitor effects and side effects   Outcome: Progressing  Goal: Refrain from isolation  Description: Interventions:  - Develop a trusting relationship   - Encourage socialization   Outcome: Progressing  Goal: Refrain from self-neglect  Outcome: Progressing  Goal: Attend and participate in unit activities, including therapeutic, recreational, and educational groups  Description: Interventions:  - Provide therapeutic and educational activities daily, encourage attendance and participation, and document same in the medical record   Outcome: Progressing     Problem: Anxiety  Goal: Anxiety is at manageable level  Description: Interventions:  - Assess and monitor patient's anxiety level.   - Monitor for signs and symptoms (heart palpitations, chest pain,  shortness of breath, headaches, nausea, feeling jumpy, restlessness, irritable, apprehensive).   - Collaborate with interdisciplinary team and initiate plan and interventions as ordered.  - [place] patient to unit/surroundings  - Explain treatment plan  - Encourage participation in care  - Encourage verbalization of concerns/fears  - Identify coping mechanisms  - Assist in developing anxiety-reducing skills  - Administer/offer alternative therapies  - Limit or eliminate stimulants  Outcome: Progressing     Problem: Alteration in Orientation  Goal: Treatment Goal: Demonstrate a reduction of confusion and improved orientation to person, place, time and/or situation upon discharge, according to optimum baseline/ability  Outcome: Progressing  Goal: Interact with staff daily  Description: Interventions:  - Assess and re-assess patient's level of orientation  - Engage patient in 1 on 1 interactions, daily, for a minimum of 15 minutes   - Establish rapport/trust with patient   Outcome: Progressing     Problem: Alteration in Thoughts and Perception  Goal: Verbalize thoughts and feelings  Description: Interventions:  - Promote a nonjudgmental and trusting relationship with the patient through active listening and therapeutic communication  - Assess patient's level of functioning, behavior and potential for risk  - Engage patient in 1 on 1 interactions  - Encourage patient to express fears, feelings, frustrations, and discuss symptoms    - [place] patient to reality, help patient recognize reality-based thinking   - Administer medications as ordered and assess for potential side effects  - Provide the patient education related to the signs and symptoms of the illness and desired effects of prescribed medications  Outcome: Progressing  Goal: Refrain from acting on delusional thinking/internal stimuli  Description: Interventions:  - Monitor patient closely, per order   - Utilize least restrictive measures   - Set reasonable  limits, give positive feedback for acceptable   - Administer medications as ordered and monitor of potential side effects  Outcome: Progressing  Goal: Agree to be compliant with medication regime, as prescribed and report medication side effects  Description: Interventions:  - Offer appropriate PRN medication and supervise ingestion; conduct AIMS, as needed   Outcome: Progressing  Goal: Attend and participate in unit activities, including therapeutic, recreational, and educational groups  Description: Interventions:  -Encourage Visitation and family involvement in care  Outcome: Progressing  Goal: Recognize dysfunctional thoughts, communicate reality-based thoughts at the time of discharge  Description: Interventions:  - Provide medication and psycho-education to assist patient in compliance and developing insight into his/her illness   Outcome: Progressing  Goal: Complete daily ADLs, including personal hygiene independently, as able  Description: Interventions:  - Observe, teach, and assist patient with ADLS  - Monitor and promote a balance of rest/activity, with adequate nutrition and elimination   Outcome: Progressing     Problem: Ineffective Coping  Goal: Identifies ineffective coping skills  Outcome: Progressing  Goal: Identifies healthy coping skills  Outcome: Progressing  Goal: Demonstrates healthy coping skills  Outcome: Progressing  Goal: Participates in unit activities  Description: Interventions:  - Provide therapeutic environment   - Provide required programming   - Redirect inappropriate behaviors   Outcome: Progressing     Problem: Depression  Goal: Treatment Goal: Demonstrate behavioral control of depressive symptoms, verbalize feelings of improved mood/affect, and adopt new coping skills prior to discharge  Outcome: Progressing  Goal: Verbalize thoughts and feelings  Description: Interventions:  - Assess and re-assess patient's level of risk   - Engage patient in 1:1 interactions, daily, for a  minimum of 15 minutes   - Encourage patient to express feelings, fears, frustrations, hopes   Outcome: Progressing  Goal: Refrain from harming self  Description: Interventions:  - Monitor patient closely, per order   - Supervise medication ingestion, monitor effects and side effects   Outcome: Progressing  Goal: Refrain from isolation  Description: Interventions:  - Develop a trusting relationship   - Encourage socialization   Outcome: Progressing  Goal: Refrain from self-neglect  Outcome: Progressing  Goal: Attend and participate in unit activities, including therapeutic, recreational, and educational groups  Description: Interventions:  - Provide therapeutic and educational activities daily, encourage attendance and participation, and document same in the medical record   Outcome: Progressing     Problem: Anxiety  Goal: Anxiety is at manageable level  Description: Interventions:  - Assess and monitor patient's anxiety level.   - Monitor for signs and symptoms (heart palpitations, chest pain, shortness of breath, headaches, nausea, feeling jumpy, restlessness, irritable, apprehensive).   - Collaborate with interdisciplinary team and initiate plan and interventions as ordered.  - [place] patient to unit/surroundings  - Explain treatment plan  - Encourage participation in care  - Encourage verbalization of concerns/fears  - Identify coping mechanisms  - Assist in developing anxiety-reducing skills  - Administer/offer alternative therapies  - Limit or eliminate stimulants  Outcome: Progressing     Problem: Alteration in Orientation  Goal: Treatment Goal: Demonstrate a reduction of confusion and improved orientation to person, place, time and/or situation upon discharge, according to optimum baseline/ability  Outcome: Progressing  Goal: Interact with staff daily  Description: Interventions:  - Assess and re-assess patient's level of orientation  - Engage patient in 1 on 1 interactions, daily, for a minimum of 15 minutes    - Establish rapport/trust with patient   Outcome: Progressing     Problem: Alteration in Thoughts and Perception  Goal: Treatment Goal: Gain control of psychotic behaviors/thinking, reduce/eliminate presenting symptoms and demonstrate improved reality functioning upon discharge  Outcome: Not Progressing

## 2024-05-29 NOTE — PROGRESS NOTES
05/29/24 0714   Team Meeting   Meeting Type Daily Rounds   Team Members Present   Team Members Present Physician;Nurse;;Other (Discipline and Name)   Patient/Family Present   Patient Present No   Patient's Family Present No     In attendance:  Dr. Alex Thomas, MD Dr. Jordan Holter, HOLLI Weiss, RN  Luisana Alvarado, [facility]  Carla Lux, [facility]    Groups: 5/7    Pt reports ongoing voices. Pt had labs completed this morning. Pt is social with select peers and has been more visible, talkative and engaged on the unit.

## 2024-05-30 PROCEDURE — 99232 SBSQ HOSP IP/OBS MODERATE 35: CPT | Performed by: PSYCHIATRY & NEUROLOGY

## 2024-05-30 RX ORDER — ARIPIPRAZOLE 5 MG/1
5 TABLET ORAL DAILY
Status: DISCONTINUED | OUTPATIENT
Start: 2024-05-30 | End: 2024-06-10

## 2024-05-30 RX ADMIN — NICOTINE POLACRILEX 2 MG: 2 GUM, CHEWING ORAL at 08:17

## 2024-05-30 RX ADMIN — ESCITALOPRAM OXALATE 20 MG: 10 TABLET, FILM COATED ORAL at 08:17

## 2024-05-30 RX ADMIN — PROPRANOLOL HYDROCHLORIDE 10 MG: 10 TABLET ORAL at 08:18

## 2024-05-30 RX ADMIN — METFORMIN HYDROCHLORIDE 500 MG: 500 TABLET, FILM COATED ORAL at 08:18

## 2024-05-30 RX ADMIN — SENNOSIDES AND DOCUSATE SODIUM 2 TABLET: 8.6; 5 TABLET ORAL at 08:17

## 2024-05-30 RX ADMIN — METFORMIN HYDROCHLORIDE 500 MG: 500 TABLET, FILM COATED ORAL at 17:29

## 2024-05-30 RX ADMIN — NICOTINE POLACRILEX 2 MG: 2 GUM, CHEWING ORAL at 14:13

## 2024-05-30 RX ADMIN — SENNOSIDES AND DOCUSATE SODIUM 2 TABLET: 8.6; 5 TABLET ORAL at 17:29

## 2024-05-30 RX ADMIN — POLYETHYLENE GLYCOL 3350 17 G: 17 POWDER, FOR SOLUTION ORAL at 08:18

## 2024-05-30 RX ADMIN — MELATONIN TAB 3 MG 3 MG: 3 TAB at 21:26

## 2024-05-30 RX ADMIN — PROPRANOLOL HYDROCHLORIDE 10 MG: 10 TABLET ORAL at 21:26

## 2024-05-30 RX ADMIN — ARIPIPRAZOLE 5 MG: 5 TABLET ORAL at 08:17

## 2024-05-30 RX ADMIN — CLOZAPINE 500 MG: 100 TABLET ORAL at 21:26

## 2024-05-30 RX ADMIN — HYDROCHLOROTHIAZIDE 12.5 MG: 12.5 TABLET ORAL at 08:17

## 2024-05-30 RX ADMIN — AMLODIPINE BESYLATE 5 MG: 5 TABLET ORAL at 08:17

## 2024-05-30 RX ADMIN — FLUTICASONE PROPIONATE 1 SPRAY: 50 SPRAY, METERED NASAL at 08:17

## 2024-05-30 RX ADMIN — NICOTINE POLACRILEX 2 MG: 2 GUM, CHEWING ORAL at 18:31

## 2024-05-30 RX ADMIN — CYANOCOBALAMIN TAB 1000 MCG 1000 MCG: 1000 TAB at 08:17

## 2024-05-30 RX ADMIN — LORATADINE 10 MG: 10 TABLET ORAL at 08:17

## 2024-05-30 NOTE — PLAN OF CARE
Patient has regularly attended groups and other unit activities in past two days.     Problem: Alteration in Thoughts and Perception  Goal: Attend and participate in unit activities, including therapeutic, recreational, and educational groups  Description: Interventions:  -Encourage Visitation and family involvement in care  Outcome: Progressing     Problem: Ineffective Coping  Goal: Participates in unit activities  Description: Interventions:  - Provide therapeutic environment   - Provide required programming   - Redirect inappropriate behaviors   Outcome: Progressing     Problem: Depression  Goal: Attend and participate in unit activities, including therapeutic, recreational, and educational groups  Description: Interventions:  - Provide therapeutic and educational activities daily, encourage attendance and participation, and document same in the medical record   Outcome: Progressing

## 2024-05-30 NOTE — PROGRESS NOTES
05/30/24 0739   Team Meeting   Meeting Type Daily Rounds   Team Members Present   Team Members Present Physician;Nurse;;Other (Discipline and Name)   Patient/Family Present   Patient Present No   Patient's Family Present No     In attendance:  Dr. Alex Thomas, MD Dr. Jordan Holter, HOLLI Weiss, RN  Luisana Alvarado, [facility]  Carla Lux, [facility] [place]    Groups: 8/8    Pt reports ongoing AH that he's managing. Pt will have ECT maintenance every other week. Pt is selectively social. Pt's Risperdal changed to Abilify due to elevated Prolactin levels.

## 2024-05-30 NOTE — PROGRESS NOTES
Progress Note - Behavioral Health   Alberto Berumen 28 y.o. male MRN: [id number]  Unit/Bed#: [facility] 101-02 Encounter: [id number]    Assessment & Plan   Principal Problem:    Schizoaffective disorder, bipolar type (HCC)  Active Problems:    GERD (gastroesophageal reflux disease)    Medical clearance for psychiatric admission    Tobacco abuse    T wave inversion in EKG    Chronic idiopathic constipation    Confluent and reticulate papillomatosis    Primary hypertension    Elevated hemoglobin A1c    Bilateral lower extremity edema      Behavior over the last 24 hours:  unchanged  Sleep: normal  Appetite: normal  Medication side effects: Yes Continued side effect of drooling.   ROS: no complaints    Subjective: Patient is a 28 year old male diagnosed with schizoaffective disorder bipolar type. He reports that he has been feeling sad over the past 24 hours due to continuous voices that are telling him they will kill him and his family. He reports these voices continue to make him feel anxious and stressed. He rates his depression to be a continued 6/10 in severity. He has not been experiencing thoughts of self harm, suicidal ideation, or homicidal ideation over the past 24 hours. He reports that he feels the ECTs have been helping him and is glad he has another ECT coming up. Nursing staff reported that he has been social, happy, visible, and has been denying signs and symptoms. He went to 7/10 groups yesterdays. He has not displayed aggressive behavior. He does spend time socializing with some of his peers.     Mental Status Evaluation:  Appearance:  casually dressed, disheveled, older than stated age, and overweight, appropriate eye contact.   Behavior:  The patient is cooperative, slow in movements, slow in responses to questions, and restless.     Speech:  normal pitch and normal volume   Mood:  anxious and sad and stressed.   Affect:  constricted and mood-congruent, and sad.    Thought Process:  normal and logical    Associations: intact associations   Thought Content:  delusions  persecutory and thoughts of someone wanting to kill him and his family. Paranoid ideation, no suicidal or homicidal ideation at this time. Denies phobias, obsessions, and compulsions.    Perceptual Disturbances: Auditory hallucinations without commands. Voices are threatening to kill him and his family. No illusions, personalization, realization, and not responding to internal stimuli.    Risk Potential: Suicidal Ideations none  Homicidal Ideations none  Potential for Aggression No   Sensorium:  person, place, time/date, and situation   Memory:  short term memory intact, long term memory intact. Able to repeat 3 words after 5 minutes when given multiple choice options.    Consciousness:  alert    Attention: attention span and concentration were age appropriate   Insight:  age appropriate and good   Intelligence: Average intelligence   Judgment: age appropriate and good   Impulse Control: No evidence of poor impulse control    Gait/Station: normal gait/station   Motor Activity: no abnormal movements     Progress Toward Goals: The patient has been experiencing continued voices but states that the ECTs have been helping him feel better. He looks forward to his next ECT treatment.     Recommended Treatment: Continue with group therapy, milieu therapy and occupational therapy.      Risks, benefits and possible side effects of Medications:   Risks, benefits, and possible side effects of medications explained to patient and patient verbalizes understanding.      Medications: continue current psychiatric medications.    Counseling / Coordination of Care  Total floor / unit time spent today 20 minutes. Greater than 50% of total time was spent with the patient and / or family counseling and / or coordination of care.

## 2024-05-30 NOTE — NURSING NOTE
Pt is visible on the unit and social with select peers. Consumed 100% of dinner. Took medications without incidence but refused atropine drops. Pt is pleasant and cooperative. Attended most groups. Reports AH that are manageable. No behavioral issues. Pt offers no concerns or complaints. VSS. Continuous safety checks maintained.

## 2024-05-30 NOTE — PROGRESS NOTES
Progress Note - Behavioral Health   Alberto Berumen 28 y.o. male MRN: [id number]  Unit/Bed#: [facility] 101-02 Encounter: [id number]    Assessment & Plan   Principal Problem:    Schizoaffective disorder, bipolar type ([facility])  Active Problems:    GERD (gastroesophageal reflux disease)    Medical clearance for psychiatric admission    Tobacco abuse    T wave inversion in EKG    Chronic idiopathic constipation    Confluent and reticulate papillomatosis    Primary hypertension    Elevated hemoglobin A1c    Bilateral lower extremity edema      Behavior over the last 24 hours:  unchanged  Sleep: Reports that he has been sleeping well with no concerns  Appetite: normal  Medication side effects: Yes Has been experiencing drooling, for which he tried atropine in the past and this has not helped his symptom. He no longer takes atropine. He states that he has been experiencing this symptoms for approximately 7 weeks.   ROS: no complaints    Subjective: Patient is a 28 year old male with schizoaffective disorder bipolar type. He reports no significant changes to his health over the past 24 hours. He states that he has been feeling especially stressed and exhausted. He reports his stress is due to the voices that he has continuously been hearing. He states that these voices are telling him that they will kill him as well as his family. He states that he previously experienced suicidal ideations but no longer has those thoughts at this time. He reports that the voices make him feel hopeless and anxious. He states that he has not experienced any new delusions or symptoms over the past 24 hours. He has not exhibited any aggressive or agitated behavior over the past day, he has been pleasant and cooperative. Nursing reports that he has been visible, pleasant, cooperative, and social with his peers. His affect remains flat. He attended all 8 of the groups yesterday. He rates his depressed mood to be a 6/10 today and is unsure if there has  been any recent improvement. He reports feeling that the ECTs have been helping him and wishes he could have more frequent treatments. He states that he has been sleeping well, no difficulties with bowel movents, and no physical symptoms or concerns.     Mental Status Evaluation:  Appearance:  older than stated age, overweight, and wearing a sweat shirt with a restrepo  and clean shaven. Good eye contact.    Behavior:  The patient is cooperative, pleasant, slow in responses, slow moving, and restless   Speech:  normal pitch and normal volume   Mood:  stressed , anxious   Affect:  constricted and mood-congruent   Thought Process:  normal and logical   Associations: intact associations   Thought Content:  delusions  persecutory and preoccupied with thoughts of someone wanting to kill him and his family. Paranoid ideation. Has experienced previous suicidal ideation but reports not experiencing these at this time. No phobias, obsessions, or compulsions.    Perceptual Disturbances: Auditory hallucinations without commands that include voices that are threatening to kill him along with his family   Risk Potential: Suicidal Ideations none  Homicidal Ideations none  Potential for Aggression No   Sensorium:  person, place, time/date, and situation   Memory:  short term memory grossly intact, long term memory grossly intact, immediate memory grossly intact   Consciousness:  alert and awake    Attention: attention span and concentration were age appropriate   Intelligence: Average intelligence    Impulse control: Does not exhibit poor impulse control    Insight:  age appropriate and good, intact   Judgment: good   Gait/Station: normal balance and slow   Motor Activity: no abnormal movements       Progress Toward Goals: Patient reports that he feels that ECTs have been helping his symptoms. He requests that he has more ECTs and wishes that he could receive these treatments more often. He still experiences the voices that are  causing him distress. He continues to have a flat affect.     Recommended Treatment: Continue with group therapy, milieu therapy and occupational therapy.      Risks, benefits and possible side effects of Medications:   Risks, benefits, and possible side effects of medications explained to patient and patient verbalizes understanding.      Medications: all current active meds have been reviewed and planned medication changes: of risperidone to Abilify due to increased prolactin level . Discussed this change with the patient. Continue all other mediations.    Labs:Most Recent Labs:   Lab Results   Component Value Date    WBC 9.63 05/29/2024    RBC 5.25 05/29/2024    HGB 12.4 05/29/2024    HCT 41.7 05/29/2024     05/29/2024    RDW 15.1 05/29/2024    NEUTROABS 5.09 05/29/2024    SODIUM 135 05/29/2024    K 4.6 05/29/2024     05/29/2024    CO2 25 05/29/2024    BUN 11 05/29/2024    CREATININE 0.83 05/29/2024    GLUC 103 05/29/2024    GLUF 103 (H) 05/29/2024    CALCIUM 9.6 05/29/2024    AST 29 05/29/2024    ALT 60 (H) 05/29/2024    ALKPHOS 68 05/29/2024    TP 7.6 05/29/2024    ALB 4.2 05/29/2024    TBILI 0.36 05/29/2024    CHOLESTEROL 156 03/14/2024    HDL 44 03/14/2024    TRIG 133 03/14/2024    LDLCALC 85 03/14/2024    NONHDLC 112 03/14/2024    LITHIUM 0.61 01/09/2024    ABQ3TIEJMYLI 1.062 11/15/2023    HGBA1C 5.0 04/01/2024    EAG 97 04/01/2024    PROLACTIN 27.40 (H) 05/29/2024     Discussed elevated prolactin level with patient and the decision to change the risperidone (Risperdal) 2 mg PO he has been taking to aripiprazole (Abilify) 5 mg PO.      Counseling / Coordination of Care  Total floor / unit time spent today 20 minutes. Greater than 50% of total time was spent with the patient and / or family counseling and / or coordination of care.

## 2024-05-30 NOTE — NURSING NOTE
"Patient pleasant and appropriate on approach, denies SI/HI/VH, patient reports auditory hallucinations; states \"they are telling me my family is going to die, they are going to kill them.\"  Patient visible on unit observed socializing with peers, patient attended group.   "

## 2024-05-30 NOTE — PLAN OF CARE
Problem: Alteration in Thoughts and Perception  Goal: Treatment Goal: Gain control of psychotic behaviors/thinking, reduce/eliminate presenting symptoms and demonstrate improved reality functioning upon discharge  Outcome: Progressing  Goal: Verbalize thoughts and feelings  Description: Interventions:  - Promote a nonjudgmental and trusting relationship with the patient through active listening and therapeutic communication  - Assess patient's level of functioning, behavior and potential for risk  - Engage patient in 1 on 1 interactions  - Encourage patient to express fears, feelings, frustrations, and discuss symptoms    - [place] patient to reality, help patient recognize reality-based thinking   - Administer medications as ordered and assess for potential side effects  - Provide the patient education related to the signs and symptoms of the illness and desired effects of prescribed medications  Outcome: Progressing  Goal: Refrain from acting on delusional thinking/internal stimuli  Description: Interventions:  - Monitor patient closely, per order   - Utilize least restrictive measures   - Set reasonable limits, give positive feedback for acceptable   - Administer medications as ordered and monitor of potential side effects  Outcome: Progressing  Goal: Agree to be compliant with medication regime, as prescribed and report medication side effects  Description: Interventions:  - Offer appropriate PRN medication and supervise ingestion; conduct AIMS, as needed   Outcome: Progressing  Goal: Attend and participate in unit activities, including therapeutic, recreational, and educational groups  Description: Interventions:  -Encourage Visitation and family involvement in care  Outcome: Progressing  Goal: Recognize dysfunctional thoughts, communicate reality-based thoughts at the time of discharge  Description: Interventions:  - Provide medication and psycho-education to assist patient in compliance and developing  insight into his/her illness   Outcome: Progressing  Goal: Complete daily ADLs, including personal hygiene independently, as able  Description: Interventions:  - Observe, teach, and assist patient with ADLS  - Monitor and promote a balance of rest/activity, with adequate nutrition and elimination   Outcome: Progressing     Problem: Ineffective Coping  Goal: Demonstrates healthy coping skills  Outcome: Progressing  Goal: Participates in unit activities  Description: Interventions:  - Provide therapeutic environment   - Provide required programming   - Redirect inappropriate behaviors   Outcome: Progressing     Problem: Depression  Goal: Verbalize thoughts and feelings  Description: Interventions:  - Assess and re-assess patient's level of risk   - Engage patient in 1:1 interactions, daily, for a minimum of 15 minutes   - Encourage patient to express feelings, fears, frustrations, hopes   Outcome: Progressing  Goal: Refrain from harming self  Description: Interventions:  - Monitor patient closely, per order   - Supervise medication ingestion, monitor effects and side effects   Outcome: Progressing  Goal: Refrain from isolation  Description: Interventions:  - Develop a trusting relationship   - Encourage socialization   Outcome: Progressing  Goal: Refrain from self-neglect  Outcome: Progressing  Goal: Complete daily ADLs, including personal hygiene independently, as able  Description: Interventions:  - Observe, teach, and assist patient with ADLS  -  Monitor and promote a balance of rest/activity, with adequate nutrition and elimination   Outcome: Progressing     Problem: Anxiety  Goal: Anxiety is at manageable level  Description: Interventions:  - Assess and monitor patient's anxiety level.   - Monitor for signs and symptoms (heart palpitations, chest pain, shortness of breath, headaches, nausea, feeling jumpy, restlessness, irritable, apprehensive).   - Collaborate with interdisciplinary team and initiate plan and  interventions as ordered.  - [place] patient to unit/surroundings  - Explain treatment plan  - Encourage participation in care  - Encourage verbalization of concerns/fears  - Identify coping mechanisms  - Assist in developing anxiety-reducing skills  - Administer/offer alternative therapies  - Limit or eliminate stimulants  Outcome: Progressing     Problem: Alteration in Orientation  Goal: Treatment Goal: Demonstrate a reduction of confusion and improved orientation to person, place, time and/or situation upon discharge, according to optimum baseline/ability  Outcome: Progressing  Goal: Cooperate with recommended testing/procedures  Description: Interventions:  - Determine need for ancillary testing  - Observe for mental status changes  - Implement falls/precaution protocol   Outcome: Progressing  Goal: Attend and participate in unit activities, including therapeutic, recreational, and educational groups  Description: Interventions:  - Provide therapeutic and educational activities daily, encourage attendance and participation, and document same in the medical record   - Provide appropriate opportunities for reminiscence   - Provide a consistent daily routine   - Encourage family contact/visitation   Outcome: Progressing

## 2024-05-30 NOTE — NURSING NOTE
Patient is visible, bright and cooperative with care. Pt takes all medications without issue.  Pt offers no complaints at this time.

## 2024-05-31 PROCEDURE — 99232 SBSQ HOSP IP/OBS MODERATE 35: CPT | Performed by: PSYCHIATRY & NEUROLOGY

## 2024-05-31 RX ADMIN — METFORMIN HYDROCHLORIDE 500 MG: 500 TABLET, FILM COATED ORAL at 08:47

## 2024-05-31 RX ADMIN — SENNOSIDES AND DOCUSATE SODIUM 2 TABLET: 8.6; 5 TABLET ORAL at 17:25

## 2024-05-31 RX ADMIN — CLOZAPINE 500 MG: 100 TABLET ORAL at 21:24

## 2024-05-31 RX ADMIN — CYANOCOBALAMIN TAB 1000 MCG 1000 MCG: 1000 TAB at 08:47

## 2024-05-31 RX ADMIN — PROPRANOLOL HYDROCHLORIDE 10 MG: 10 TABLET ORAL at 08:47

## 2024-05-31 RX ADMIN — POLYETHYLENE GLYCOL 3350 17 G: 17 POWDER, FOR SOLUTION ORAL at 08:47

## 2024-05-31 RX ADMIN — NICOTINE POLACRILEX 2 MG: 2 GUM, CHEWING ORAL at 09:08

## 2024-05-31 RX ADMIN — NICOTINE POLACRILEX 2 MG: 2 GUM, CHEWING ORAL at 15:33

## 2024-05-31 RX ADMIN — ARIPIPRAZOLE 5 MG: 5 TABLET ORAL at 08:47

## 2024-05-31 RX ADMIN — HYDROCHLOROTHIAZIDE 12.5 MG: 12.5 TABLET ORAL at 08:47

## 2024-05-31 RX ADMIN — ESCITALOPRAM OXALATE 20 MG: 10 TABLET, FILM COATED ORAL at 08:47

## 2024-05-31 RX ADMIN — AMLODIPINE BESYLATE 5 MG: 5 TABLET ORAL at 08:47

## 2024-05-31 RX ADMIN — METFORMIN HYDROCHLORIDE 500 MG: 500 TABLET, FILM COATED ORAL at 17:25

## 2024-05-31 RX ADMIN — PROPRANOLOL HYDROCHLORIDE 10 MG: 10 TABLET ORAL at 21:24

## 2024-05-31 RX ADMIN — FLUTICASONE PROPIONATE 1 SPRAY: 50 SPRAY, METERED NASAL at 08:49

## 2024-05-31 RX ADMIN — LORATADINE 10 MG: 10 TABLET ORAL at 08:47

## 2024-05-31 RX ADMIN — SENNOSIDES AND DOCUSATE SODIUM 2 TABLET: 8.6; 5 TABLET ORAL at 08:47

## 2024-05-31 RX ADMIN — MELATONIN TAB 3 MG 3 MG: 3 TAB at 21:24

## 2024-05-31 NOTE — NURSING NOTE
Patient returned from ECT, visible in dayroom, refused swallow test,will monitor patient for swallow at breakfast.

## 2024-05-31 NOTE — PROGRESS NOTES
05/31/24 0733   Team Meeting   Meeting Type Daily Rounds   Team Members Present   Team Members Present Physician;Nurse;;Other (Discipline and Name)   Patient/Family Present   Patient Present No   Patient's Family Present No     In attendance:  Dr. Alex Thomas, MD Dr. Jordan Holter, HOLLI Weiss, MIGUEL Alvarado, [facility]  Carla Lux, [facility]    Groups: 8/10    Pt starts ECT maintenance on 6/7. Pt is social, happy and visible. Pt denies symptoms. Pt did make comments that he would be sexual with female peer on the unit.

## 2024-05-31 NOTE — NURSING NOTE
Patient cooperative with care, medication compliant, able to make needs known, out of room for some groups, out of room for meals. Social with select peers. Endorses anxiety/depression, and AH stating they are negative but non commanding, patient counseled. Denied SI/HI/VH. No other concerns voiced, safety precautions maintained.

## 2024-05-31 NOTE — PLAN OF CARE
Problem: Alteration in Thoughts and Perception  Goal: Verbalize thoughts and feelings  Description: Interventions:  - Promote a nonjudgmental and trusting relationship with the patient through active listening and therapeutic communication  - Assess patient's level of functioning, behavior and potential for risk  - Engage patient in 1 on 1 interactions  - Encourage patient to express fears, feelings, frustrations, and discuss symptoms    - [place] patient to reality, help patient recognize reality-based thinking   - Administer medications as ordered and assess for potential side effects  - Provide the patient education related to the signs and symptoms of the illness and desired effects of prescribed medications  Outcome: Progressing  Goal: Agree to be compliant with medication regime, as prescribed and report medication side effects  Description: Interventions:  - Offer appropriate PRN medication and supervise ingestion; conduct AIMS, as needed   Outcome: Progressing  Goal: Attend and participate in unit activities, including therapeutic, recreational, and educational groups  Description: Interventions:  -Encourage Visitation and family involvement in care  Outcome: Progressing  Goal: Complete daily ADLs, including personal hygiene independently, as able  Description: Interventions:  - Observe, teach, and assist patient with ADLS  - Monitor and promote a balance of rest/activity, with adequate nutrition and elimination   Outcome: Progressing     Problem: Ineffective Coping  Goal: Demonstrates healthy coping skills  Outcome: Progressing  Goal: Participates in unit activities  Description: Interventions:  - Provide therapeutic environment   - Provide required programming   - Redirect inappropriate behaviors   Outcome: Progressing     Problem: Depression  Goal: Verbalize thoughts and feelings  Description: Interventions:  - Assess and re-assess patient's level of risk   - Engage patient in 1:1 interactions, daily,  for a minimum of 15 minutes   - Encourage patient to express feelings, fears, frustrations, hopes   Outcome: Progressing  Goal: Refrain from harming self  Description: Interventions:  - Monitor patient closely, per order   - Supervise medication ingestion, monitor effects and side effects   Outcome: Progressing  Goal: Refrain from isolation  Description: Interventions:  - Develop a trusting relationship   - Encourage socialization   Outcome: Progressing  Goal: Refrain from self-neglect  Outcome: Progressing  Goal: Attend and participate in unit activities, including therapeutic, recreational, and educational groups  Description: Interventions:  - Provide therapeutic and educational activities daily, encourage attendance and participation, and document same in the medical record   Outcome: Progressing  Goal: Complete daily ADLs, including personal hygiene independently, as able  Description: Interventions:  - Observe, teach, and assist patient with ADLS  -  Monitor and promote a balance of rest/activity, with adequate nutrition and elimination   Outcome: Progressing     Problem: Anxiety  Goal: Anxiety is at manageable level  Description: Interventions:  - Assess and monitor patient's anxiety level.   - Monitor for signs and symptoms (heart palpitations, chest pain, shortness of breath, headaches, nausea, feeling jumpy, restlessness, irritable, apprehensive).   - Collaborate with interdisciplinary team and initiate plan and interventions as ordered.  - [place] patient to unit/surroundings  - Explain treatment plan  - Encourage participation in care  - Encourage verbalization of concerns/fears  - Identify coping mechanisms  - Assist in developing anxiety-reducing skills  - Administer/offer alternative therapies  - Limit or eliminate stimulants  Outcome: Progressing     Problem: Alteration in Orientation  Goal: Treatment Goal: Demonstrate a reduction of confusion and improved orientation to person, place, time and/or  situation upon discharge, according to optimum baseline/ability  Outcome: Progressing  Goal: Interact with staff daily  Description: Interventions:  - Assess and re-assess patient's level of orientation  - Engage patient in 1 on 1 interactions, daily, for a minimum of 15 minutes   - Establish rapport/trust with patient   Outcome: Progressing  Goal: Complete daily ADLs, including personal hygiene independently, as able  Description: Interventions:  - Observe, teach, and assist patient with ADLS  Outcome: Progressing     Problem: DISCHARGE PLANNING - CARE MANAGEMENT  Goal: Discharge to post-acute care or home with appropriate resources  Description: INTERVENTIONS:  - Conduct assessment to determine patient/family and health care team treatment goals, and need for post-acute services based on payer coverage, community resources, and patient preferences, and barriers to discharge  - Address psychosocial, clinical, and financial barriers to discharge as identified in assessment in conjunction with the patient/family and health care team  - Arrange appropriate level of post-acute services according to patient’s   needs and preference and payer coverage in collaboration with the physician and health care team  - Communicate with and update the patient/family, physician, and health care team regarding progress on the discharge plan  - Arrange appropriate transportation to post-acute venues  Outcome: Progressing     Problem: Alteration in Thoughts and Perception  Goal: Treatment Goal: Gain control of psychotic behaviors/thinking, reduce/eliminate presenting symptoms and demonstrate improved reality functioning upon discharge  Outcome: Not Progressing  Goal: Refrain from acting on delusional thinking/internal stimuli  Description: Interventions:  - Monitor patient closely, per order   - Utilize least restrictive measures   - Set reasonable limits, give positive feedback for acceptable   - Administer medications as ordered and  monitor of potential side effects  Outcome: Not Progressing  Goal: Recognize dysfunctional thoughts, communicate reality-based thoughts at the time of discharge  Description: Interventions:  - Provide medication and psycho-education to assist patient in compliance and developing insight into his/her illness   Outcome: Not Progressing     Problem: Depression  Goal: Treatment Goal: Demonstrate behavioral control of depressive symptoms, verbalize feelings of improved mood/affect, and adopt new coping skills prior to discharge  Outcome: Not Progressing

## 2024-06-01 PROCEDURE — 99232 SBSQ HOSP IP/OBS MODERATE 35: CPT | Performed by: PHYSICIAN ASSISTANT

## 2024-06-01 RX ADMIN — ESCITALOPRAM OXALATE 20 MG: 10 TABLET, FILM COATED ORAL at 09:03

## 2024-06-01 RX ADMIN — NICOTINE POLACRILEX 2 MG: 2 GUM, CHEWING ORAL at 21:09

## 2024-06-01 RX ADMIN — HALOPERIDOL 1 MG: 1 TABLET ORAL at 17:15

## 2024-06-01 RX ADMIN — NICOTINE POLACRILEX 2 MG: 2 GUM, CHEWING ORAL at 14:18

## 2024-06-01 RX ADMIN — ARIPIPRAZOLE 5 MG: 5 TABLET ORAL at 09:04

## 2024-06-01 RX ADMIN — POLYETHYLENE GLYCOL 3350 17 G: 17 POWDER, FOR SOLUTION ORAL at 09:07

## 2024-06-01 RX ADMIN — CLOZAPINE 500 MG: 100 TABLET ORAL at 21:09

## 2024-06-01 RX ADMIN — SENNOSIDES AND DOCUSATE SODIUM 2 TABLET: 8.6; 5 TABLET ORAL at 09:03

## 2024-06-01 RX ADMIN — NICOTINE POLACRILEX 2 MG: 2 GUM, CHEWING ORAL at 10:15

## 2024-06-01 RX ADMIN — CYANOCOBALAMIN TAB 1000 MCG 1000 MCG: 1000 TAB at 09:03

## 2024-06-01 RX ADMIN — PROPRANOLOL HYDROCHLORIDE 10 MG: 10 TABLET ORAL at 21:09

## 2024-06-01 RX ADMIN — METFORMIN HYDROCHLORIDE 500 MG: 500 TABLET, FILM COATED ORAL at 09:03

## 2024-06-01 RX ADMIN — SENNOSIDES AND DOCUSATE SODIUM 2 TABLET: 8.6; 5 TABLET ORAL at 17:15

## 2024-06-01 RX ADMIN — FLUTICASONE PROPIONATE 1 SPRAY: 50 SPRAY, METERED NASAL at 09:10

## 2024-06-01 RX ADMIN — LORATADINE 10 MG: 10 TABLET ORAL at 09:04

## 2024-06-01 RX ADMIN — METFORMIN HYDROCHLORIDE 500 MG: 500 TABLET, FILM COATED ORAL at 17:15

## 2024-06-01 RX ADMIN — MELATONIN TAB 3 MG 3 MG: 3 TAB at 21:09

## 2024-06-01 NOTE — NURSING NOTE
, manual recheck 100. Patient reported he feels his elevated HR is r/t bothersome AH. States he feels more irritable than anxious. Discussed possible trigger being overstimulation. Quiet room offered but declined. PRN haldol 1mg administered at 1715. Med education provided and instructed to report to staff if side effects occur. Patient denies previous side effects w/ PRN usage. Results pending.

## 2024-06-01 NOTE — PLAN OF CARE
Problem: Alteration in Thoughts and Perception  Goal: Verbalize thoughts and feelings  Description: Interventions:  - Promote a nonjudgmental and trusting relationship with the patient through active listening and therapeutic communication  - Assess patient's level of functioning, behavior and potential for risk  - Engage patient in 1 on 1 interactions  - Encourage patient to express fears, feelings, frustrations, and discuss symptoms    - [place] patient to reality, help patient recognize reality-based thinking   - Administer medications as ordered and assess for potential side effects  - Provide the patient education related to the signs and symptoms of the illness and desired effects of prescribed medications  Outcome: Progressing  Goal: Refrain from acting on delusional thinking/internal stimuli  Description: Interventions:  - Monitor patient closely, per order   - Utilize least restrictive measures   - Set reasonable limits, give positive feedback for acceptable   - Administer medications as ordered and monitor of potential side effects  Outcome: Progressing  Goal: Agree to be compliant with medication regime, as prescribed and report medication side effects  Description: Interventions:  - Offer appropriate PRN medication and supervise ingestion; conduct AIMS, as needed   Outcome: Progressing  Goal: Attend and participate in unit activities, including therapeutic, recreational, and educational groups  Description: Interventions:  -Encourage Visitation and family involvement in care  Outcome: Progressing  Goal: Recognize dysfunctional thoughts, communicate reality-based thoughts at the time of discharge  Description: Interventions:  - Provide medication and psycho-education to assist patient in compliance and developing insight into his/her illness   Outcome: Progressing  Goal: Complete daily ADLs, including personal hygiene independently, as able  Description: Interventions:  - Observe, teach, and assist  patient with ADLS  - Monitor and promote a balance of rest/activity, with adequate nutrition and elimination   Outcome: Progressing     Problem: Ineffective Coping  Goal: Demonstrates healthy coping skills  Outcome: Progressing  Goal: Participates in unit activities  Description: Interventions:  - Provide therapeutic environment   - Provide required programming   - Redirect inappropriate behaviors   Outcome: Progressing     Problem: Depression  Goal: Treatment Goal: Demonstrate behavioral control of depressive symptoms, verbalize feelings of improved mood/affect, and adopt new coping skills prior to discharge  Outcome: Progressing  Goal: Refrain from harming self  Description: Interventions:  - Monitor patient closely, per order   - Supervise medication ingestion, monitor effects and side effects   Outcome: Progressing  Goal: Refrain from isolation  Description: Interventions:  - Develop a trusting relationship   - Encourage socialization   Outcome: Progressing  Goal: Refrain from self-neglect  Outcome: Progressing  Goal: Attend and participate in unit activities, including therapeutic, recreational, and educational groups  Description: Interventions:  - Provide therapeutic and educational activities daily, encourage attendance and participation, and document same in the medical record   Outcome: Progressing  Goal: Complete daily ADLs, including personal hygiene independently, as able  Description: Interventions:  - Observe, teach, and assist patient with ADLS  -  Monitor and promote a balance of rest/activity, with adequate nutrition and elimination   Outcome: Progressing     Problem: Anxiety  Goal: Anxiety is at manageable level  Description: Interventions:  - Assess and monitor patient's anxiety level.   - Monitor for signs and symptoms (heart palpitations, chest pain, shortness of breath, headaches, nausea, feeling jumpy, restlessness, irritable, apprehensive).   - Collaborate with interdisciplinary team and  initiate plan and interventions as ordered.  - [place] patient to unit/surroundings  - Explain treatment plan  - Encourage participation in care  - Encourage verbalization of concerns/fears  - Identify coping mechanisms  - Assist in developing anxiety-reducing skills  - Administer/offer alternative therapies  - Limit or eliminate stimulants  Outcome: Progressing     Problem: Alteration in Orientation  Goal: Treatment Goal: Demonstrate a reduction of confusion and improved orientation to person, place, time and/or situation upon discharge, according to optimum baseline/ability  Outcome: Progressing  Goal: Interact with staff daily  Description: Interventions:  - Assess and re-assess patient's level of orientation  - Engage patient in 1 on 1 interactions, daily, for a minimum of 15 minutes   - Establish rapport/trust with patient   Outcome: Progressing  Goal: Cooperate with recommended testing/procedures  Description: Interventions:  - Determine need for ancillary testing  - Observe for mental status changes  - Implement falls/precaution protocol   Outcome: Progressing  Goal: Attend and participate in unit activities, including therapeutic, recreational, and educational groups  Description: Interventions:  - Provide therapeutic and educational activities daily, encourage attendance and participation, and document same in the medical record   - Provide appropriate opportunities for reminiscence   - Provide a consistent daily routine   - Encourage family contact/visitation   Outcome: Progressing  Goal: Complete daily ADLs, including personal hygiene independently, as able  Description: Interventions:  - Observe, teach, and assist patient with ADLS  Outcome: Progressing     Problem: DISCHARGE PLANNING - CARE MANAGEMENT  Goal: Discharge to post-acute care or home with appropriate resources  Description: INTERVENTIONS:  - Conduct assessment to determine patient/family and health care team treatment goals, and need for  post-acute services based on payer coverage, community resources, and patient preferences, and barriers to discharge  - Address psychosocial, clinical, and financial barriers to discharge as identified in assessment in conjunction with the patient/family and health care team  - Arrange appropriate level of post-acute services according to patient’s   needs and preference and payer coverage in collaboration with the physician and health care team  - Communicate with and update the patient/family, physician, and health care team regarding progress on the discharge plan  - Arrange appropriate transportation to post-acute venues  Outcome: Progressing     Problem: Alteration in Thoughts and Perception  Goal: Treatment Goal: Gain control of psychotic behaviors/thinking, reduce/eliminate presenting symptoms and demonstrate improved reality functioning upon discharge  Outcome: Not Progressing

## 2024-06-01 NOTE — NURSING NOTE
Patient is blunted. Visible and social w/ select peers. Refused breakfast r/t sleeping. Ate 100% of lunch. Appears disheveled. Attended coping skills and nursing group. Continuous rounding maintained.

## 2024-06-01 NOTE — PROGRESS NOTES
"This note was not shared with the patient due to reasonable likelihood of causing patient harm    Progress Note - Behavioral Health     Alberto Berumen 28 y.o. male MRN: [id number]   Unit/Bed#: [facility] 101-02 Encounter: [id number]    Alberto more irritable, appears depressed, continues to report auditory hallucinations of \"voices\" and people saying they will hurt family  today. Participates more in milieu. Attends groups. Socializes with peers. Socializing more with female peers.    Assessment & Plan   Principal Problem:    Schizoaffective disorder, bipolar type (HCC)  Active Problems:    GERD (gastroesophageal reflux disease)    Medical clearance for psychiatric admission    Tobacco abuse    T wave inversion in EKG    Chronic idiopathic constipation    Confluent and reticulate papillomatosis    Primary hypertension    Elevated hemoglobin A1c    Bilateral lower extremity edema       Progress Toward Goals: remained normal. No significant changes in behaviors or clinical status over the last 24 hours.  Alberto will continue working on current treatment goals which include:  1.Continue with group therapy, milieu therapy and occupational therapy  2.Behavioral Health checks every 7 minutes  3.Continue frequent safety checks and vitals per unit protocol  4.Continue with SLIM medical management as indicated  5.The patient will be maintained on the following medications:   6. Disposition Planning: Discharge planning and efforts remain ongoing per primary treatment teams recommendatio    Patient was seen today for continuation of care, records reviewed and patient was discussed with the charge nurse.  No behavioral outbursts or acute events noted overnight and No significant changes in behaviors or clinical status over the last 24 hours.      Alberto was seen today while on the unit. He was seen spending significant amount of time with particular female peer ([place]). Per nursing pt claimed he will be having sex with a female peer " but did not disclose who. Nursing notes his HR was elevated and medical was informed. Pt reports he is irritable today. He reports continued AH of voices saying they will hurt his family. He reports some anxiety related to this. He states he sleep well but did not eat. He denies physical complaints.     Alberto does appear overtly anxious or depressed.    He denies any suicidal ideation, intent or plan at present, denies any suicidal ideation, intent or plan at present.  He reports auditory hallucinations of voices stating they will hurt his family , denies any visual hallucinations, denies any delusions.  He denies any side effects from current psychiatric medications.  No medication changes indicated at this time, continue current medication regimen.    Psychiatric Review of Systems:  Behavior over the last 24 hours: Unchanged  Sleep: sleeping okay throughout the night  Appetite: adequate  Medication side effects: none reported  ROS:no complaints, all other systems are negative    Mental Status Evaluation:    Appearance:  disheveled, looks stated age, wearing a restrepo   Behavior:  cooperative   Speech:  slow, soft   Mood:  depressed, anxious, irritable   Affect:  blunted   Thought Process:  linear   Associations: intact associations   Thought Content:  no overt delusions   Perceptual Disturbances: no visual hallucinations, auditory hallucinations   Risk Potential: Suicidal ideation - None at present  Homicidal ideation - None at present  Potential for aggression - No   Sensorium:  oriented to person, place, and time/date   Memory:  recent and remote memory grossly intact   Consciousness:  alert and awake   Attention/Concentration: attention span and concentration are age appropriate   Insight:  partial   Judgment: partial   Gait/Station: normal gait/station   Motor Activity: no abnormal movements     Vital signs in last 24 hours:    Temp:  [97.5 °F (36.4 °C)-97.6 °F (36.4 °C)] 97.5 °F (36.4 °C)  HR:  [102-111]  102  Resp:  [18] 18  BP: (141-147)/(89-91) 141/91    Laboratory results: I have personally reviewed all pertinent laboratory/tests results    Discharge Disposition: Discharge disposition and planning remain ongoing    Current Facility-Administered Medications   Medication Dose Route Frequency Provider Last Rate    acetaminophen  650 mg Oral Q4H PRN Jordan C Holter, DO      acetaminophen  650 mg Oral Q6H PRN HOLLI Lion      aluminum-magnesium hydroxide-simethicone  30 mL Oral Q4H PRN Jordan C Holter, DO      amLODIPine  5 mg Oral Daily HOLLI Lion      ARIPiprazole  5 mg Oral Daily Bora Rosario MD      Artificial Tears  1 drop Both Eyes Q3H PRN Jordan C Holter, DO      atropine  1 drop Sublingual HS HOLLI Lion      atropine  1 drop Sublingual Daily PRN HOLLI Lion      haloperidol lactate  2.5 mg Intramuscular Q4H PRN Max 4/day HOLLI Lion      And    LORazepam  1 mg Intramuscular Q4H PRN Max 4/day HOLLI Lion      And    benztropine  0.5 mg Intramuscular Q4H PRN Max 4/day HOLLI Lion      benztropine  1 mg Intramuscular Q4H PRN Max 6/day Jordan C Holter, DO      haloperidol lactate  5 mg Intramuscular Q4H PRN Max 4/day HOLLI Lion      And    LORazepam  2 mg Intramuscular Q4H PRN Max 4/day HOLLI Lion      And    benztropine  1 mg Intramuscular Q4H PRN Max 4/day HOLLI Lion      benztropine  1 mg Oral Q4H PRN Max 6/day HOLLI Lion      benztropine  1 mg Oral Q4H PRN Max 6/day Jordan C Holter, DO      bisacodyl  10 mg Rectal Daily PRN HOLLI Lion      cloZAPine  500 mg Oral HS Bora Rosario MD      cyanocobalamin  1,000 mcg Oral Daily HOLLI Galvan      hydrOXYzine HCL  50 mg Oral Q6H PRN Max 4/day Jordan C Holter, DO      Or    diphenhydrAMINE  50 mg Intramuscular Q6H PRN Jordan C Holter, DO      hydrOXYzine HCL  50 mg Oral Q6H PRN Max 4/day HOLLI Lion      Or    diphenhydrAMINE  50 mg Intramuscular Q6H PRN  HOLLI Lion      diphenhydrAMINE-zinc acetate   Topical BID PRN HOLLI Lion      escitalopram  20 mg Oral Daily HOLLI Lion      fluticasone  1 spray Each Nare Daily Brina Guillen MD      haloperidol  1 mg Oral Q6H PRN HOLLI Lion      haloperidol  2.5 mg Oral Q4H PRN Max 4/day HOLLI Lion      haloperidol  5 mg Oral Q4H PRN Max 4/day HOLLI Lion      hydroCHLOROthiazide  12.5 mg Oral Daily HOLLI Galvan      hydrocortisone   Topical 4x Daily PRN HOLLI Lion      hydrOXYzine HCL  100 mg Oral Q6H PRN Max 4/day [facility] Holter, DO      Or    LORazepam  2 mg Intramuscular Q6H PRN [facility] Holter, DO      hydrOXYzine HCL  100 mg Oral Q6H PRN Max 4/day HOLLI Lion      Or    LORazepam  2 mg Intramuscular Q6H PRN HOLLI Lion      hydrOXYzine HCL  25 mg Oral Q6H PRN Max 4/day [facility] Holter, DO      ibuprofen  600 mg Oral Q8H PRN HOLLI Lion      loratadine  10 mg Oral Daily Brina Guillen MD      melatonin  3 mg Oral HS [facility] Holter, DO      metFORMIN  500 mg Oral BID With Meals HOLLI Galvan      methocarbamol  500 mg Oral Q6H PRN HOLLI Lion      nicotine polacrilex  2 mg Oral Q4H PRN Bora Rosario MD      OLANZapine  5 mg Oral Q4H PRN Max 3/day [facility] Holter, DO      Or    OLANZapine  2.5 mg Intramuscular Q4H PRN Max 3/day [facility] Holter, DO      OLANZapine  5 mg Oral Q3H PRN Max 3/day [facility] Holter, DO      Or    OLANZapine  5 mg Intramuscular Q3H PRN Max 3/day [facility] Holter, DO      OLANZapine  2.5 mg Oral Q4H PRN Max 6/day [facility] Holter, DO      ondansetron  4 mg Oral Q6H PRN HOLLI Lion      polyethylene glycol  17 g Oral Daily HOLLI Lion      polyethylene glycol  17 g Oral Daily PRN [facility] Holter, DO      propranolol  10 mg Oral Q12H KAYLIE HOLLI Lion      senna-docusate sodium  1 tablet Oral Daily PRN Jordan C Holter, DO      senna-docusate sodium  2 tablet Oral BID HOLLI Lion       traZODone  50 mg Oral HS PRN HOLLI Lion      white petrolatum-mineral oil   Topical TID PRN HOLLI Lion       Risks / Benefits of Treatment:    Risks, benefits, and possible side effects of medications explained to patient and patient verbalizes understanding and agreement for treatment.    Counseling / Coordination of Care:    Total floor / unit time spent today 25 minutes. Greater than 50% of total time was spent with the patient and / or family counseling and / or coordination of care. A description of counseling / coordination of care:  Patient's progress reviewed with nursing staff.  Medications, treatment progress and treatment plan reviewed with patient.    Axel Bowens 06/01/24

## 2024-06-02 PROCEDURE — 99232 SBSQ HOSP IP/OBS MODERATE 35: CPT | Performed by: PHYSICIAN ASSISTANT

## 2024-06-02 RX ADMIN — PROPRANOLOL HYDROCHLORIDE 10 MG: 10 TABLET ORAL at 08:17

## 2024-06-02 RX ADMIN — CLOZAPINE 500 MG: 100 TABLET ORAL at 21:29

## 2024-06-02 RX ADMIN — METFORMIN HYDROCHLORIDE 500 MG: 500 TABLET, FILM COATED ORAL at 17:45

## 2024-06-02 RX ADMIN — SENNOSIDES AND DOCUSATE SODIUM 2 TABLET: 8.6; 5 TABLET ORAL at 08:16

## 2024-06-02 RX ADMIN — AMLODIPINE BESYLATE 5 MG: 5 TABLET ORAL at 08:17

## 2024-06-02 RX ADMIN — ESCITALOPRAM OXALATE 20 MG: 10 TABLET, FILM COATED ORAL at 08:16

## 2024-06-02 RX ADMIN — NICOTINE POLACRILEX 2 MG: 2 GUM, CHEWING ORAL at 21:29

## 2024-06-02 RX ADMIN — ARIPIPRAZOLE 5 MG: 5 TABLET ORAL at 08:17

## 2024-06-02 RX ADMIN — SENNOSIDES AND DOCUSATE SODIUM 2 TABLET: 8.6; 5 TABLET ORAL at 17:45

## 2024-06-02 RX ADMIN — MELATONIN TAB 3 MG 3 MG: 3 TAB at 21:29

## 2024-06-02 RX ADMIN — CYANOCOBALAMIN TAB 1000 MCG 1000 MCG: 1000 TAB at 08:17

## 2024-06-02 RX ADMIN — HYDROCHLOROTHIAZIDE 12.5 MG: 12.5 TABLET ORAL at 08:17

## 2024-06-02 RX ADMIN — FLUTICASONE PROPIONATE 1 SPRAY: 50 SPRAY, METERED NASAL at 08:25

## 2024-06-02 RX ADMIN — METFORMIN HYDROCHLORIDE 500 MG: 500 TABLET, FILM COATED ORAL at 08:17

## 2024-06-02 RX ADMIN — LORATADINE 10 MG: 10 TABLET ORAL at 08:17

## 2024-06-02 RX ADMIN — NICOTINE POLACRILEX 2 MG: 2 GUM, CHEWING ORAL at 13:00

## 2024-06-02 RX ADMIN — NICOTINE POLACRILEX 2 MG: 2 GUM, CHEWING ORAL at 17:45

## 2024-06-02 RX ADMIN — NICOTINE POLACRILEX 2 MG: 2 GUM, CHEWING ORAL at 08:17

## 2024-06-02 RX ADMIN — PROPRANOLOL HYDROCHLORIDE 10 MG: 10 TABLET ORAL at 21:29

## 2024-06-02 RX ADMIN — HALOPERIDOL 2.5 MG: 1 TABLET ORAL at 17:45

## 2024-06-02 NOTE — NURSING NOTE
Reported PRN haldol was mildly effective. Listening to music and calling family as coping skills.

## 2024-06-02 NOTE — PROGRESS NOTES
This note was not shared with the patient due to reasonable likelihood of causing patient harm    Progress Note - Behavioral Health     Alberto Berumen 28 y.o. male MRN: [id number]   Unit/Bed#: [facility] 101-02 Encounter: [id number]    Alberto remains anxious and irritable, appears still anxious today. Participates appropriately in milieu. Attends groups. Socializes with peers.    Assessment & Plan   Principal Problem:    Schizoaffective disorder, bipolar type (HCC)  Active Problems:    GERD (gastroesophageal reflux disease)    Medical clearance for psychiatric admission    Tobacco abuse    T wave inversion in EKG    Chronic idiopathic constipation    Confluent and reticulate papillomatosis    Primary hypertension    Elevated hemoglobin A1c    Bilateral lower extremity edema       Progress Toward Goals: remained normal. No significant changes in behaviors or clinical status over the last 24 hours.  Alberto will continue working on current treatment goals which include:  1.Continue with group therapy, milieu therapy and occupational therapy  2.Behavioral Health checks every 7 minutes  3.Continue frequent safety checks and vitals per unit protocol  4.Continue with SLIM medical management as indicated  5.The patient will be maintained on current medications  6. Disposition Planning: Discharge planning and efforts remain ongoing per primary treatment teams recommendatio    Patient was seen today for continuation of care, records reviewed and patient was discussed with the charge nurse.  No behavioral outbursts or acute events noted overnight and No significant changes in behaviors or clinical status over the last 24 hours.      Alberto was seen today while on the unit. He reports still being irritable and anxious as well as tired this AM. He admits to continued AH which are still bothersome and speak of hurting his family. He denies VH/SI/HI. He reports sleeping and eating well. Per nursing yesterday he had PRN Haldol due  "to bothersome AH.       Alberto does appear overtly anxious or depressed.    He denies any suicidal ideation, intent or plan at present; denies any homicidal ideation, intent or plan at present.  He continues to experience auditory hallucinations of \"voices\", denies any visual hallucinations, denies any delusions.  He denies any side effects from current psychiatric medications.  No medication changes indicated at this time, continue current medication regimen.    Psychiatric Review of Systems:  Behavior over the last 24 hours: Unchanged  Sleep: sleeping okay throughout the night  Appetite: adequate  Medication side effects: none reported  ROS:no complaints, all other systems are negative    Mental Status Evaluation:    Appearance:  disheveled, marginal hygiene, dressed in hospital attire, wearing a restrepo   Behavior:  cooperative, interacts appropriately with this writer   Speech:  delayed, soft   Mood:  anxious, irritable   Affect:  blunted   Thought Process:  linear   Associations: intact associations   Thought Content:  no overt delusions, no paranoia noted on exam   Perceptual Disturbances: no visual hallucinations, auditory hallucinations of \"voices\"   Risk Potential: Suicidal ideation - None at present  Homicidal ideation - None at present  Potential for aggression - No   Sensorium:  oriented to person, place, and time/date   Memory:  recent and remote memory grossly intact   Consciousness:  alert and awake   Attention/Concentration: attention span and concentration are age appropriate   Insight:  partial   Judgment: partial   Gait/Station: normal gait/station   Motor Activity: no abnormal movements     Vital signs in last 24 hours:    Temp:  [97.9 °F (36.6 °C)] 97.9 °F (36.6 °C)  HR:  [] 99  Resp:  [18] 18  BP: (126-150)/() 147/90    Laboratory results: I have personally reviewed all pertinent laboratory/tests results    Discharge Disposition: Discharge disposition and planning remain " ongoing    Current Facility-Administered Medications   Medication Dose Route Frequency Provider Last Rate    acetaminophen  650 mg Oral Q4H PRN Jordan C Holter, DO      acetaminophen  650 mg Oral Q6H PRN HOLLI Lion      aluminum-magnesium hydroxide-simethicone  30 mL Oral Q4H PRN Jordan C Holter, DO      amLODIPine  5 mg Oral Daily HOLLI Lion      ARIPiprazole  5 mg Oral Daily Bora Rosario MD      Artificial Tears  1 drop Both Eyes Q3H PRN Jordan C Holter, DO      atropine  1 drop Sublingual HS HOLLI Lion      atropine  1 drop Sublingual Daily PRN HOLLI Lion      haloperidol lactate  2.5 mg Intramuscular Q4H PRN Max 4/day HOLLI Lion      And    LORazepam  1 mg Intramuscular Q4H PRN Max 4/day HOLLI Lion      And    benztropine  0.5 mg Intramuscular Q4H PRN Max 4/day HOLLI Lion      benztropine  1 mg Intramuscular Q4H PRN Max 6/day Jordan C Holter, DO      haloperidol lactate  5 mg Intramuscular Q4H PRN Max 4/day HOLLI Lion      And    LORazepam  2 mg Intramuscular Q4H PRN Max 4/day HOLLI Lion      And    benztropine  1 mg Intramuscular Q4H PRN Max 4/day HOLLI Lion      benztropine  1 mg Oral Q4H PRN Max 6/day HOLLI Lion      benztropine  1 mg Oral Q4H PRN Max 6/day Jordan C Holter, DO      bisacodyl  10 mg Rectal Daily PRN HOLLI Lion      cloZAPine  500 mg Oral HS Bora Rosario MD      cyanocobalamin  1,000 mcg Oral Daily HOLLI Galvan      hydrOXYzine HCL  50 mg Oral Q6H PRN Max 4/day Jordan C Holter, DO      Or    diphenhydrAMINE  50 mg Intramuscular Q6H PRN Jordan C Holter, DO      hydrOXYzine HCL  50 mg Oral Q6H PRN Max 4/day HOLLI Lion      Or    diphenhydrAMINE  50 mg Intramuscular Q6H PRN HOLLI Lion      diphenhydrAMINE-zinc acetate   Topical BID PRN HOLLI Lion      escitalopram  20 mg Oral Daily HOLLI Lion      fluticasone  1 spray Each Nare Daily Brina Guillen MD       haloperidol  1 mg Oral Q6H PRN HOLLI Lion      haloperidol  2.5 mg Oral Q4H PRN Max 4/day HOLLI Lion      haloperidol  5 mg Oral Q4H PRN Max 4/day HOLLI Lion      hydroCHLOROthiazide  12.5 mg Oral Daily HOLLI Galvan      hydrocortisone   Topical 4x Daily PRN HOLLI Lion      hydrOXYzine HCL  100 mg Oral Q6H PRN Max 4/day [facility] Holter, DO      Or    LORazepam  2 mg Intramuscular Q6H PRN [facility] Holter, DO      hydrOXYzine HCL  100 mg Oral Q6H PRN Max 4/day HOLLI Lion      Or    LORazepam  2 mg Intramuscular Q6H PRN HOLLI Lion      hydrOXYzine HCL  25 mg Oral Q6H PRN Max 4/day [facility] Holter, DO      ibuprofen  600 mg Oral Q8H PRN HOLLI Lion      loratadine  10 mg Oral Daily Brina Guillen MD      melatonin  3 mg Oral HS [facility] Holter, DO      metFORMIN  500 mg Oral BID With Meals HOLLI Galvan      methocarbamol  500 mg Oral Q6H PRN HOLLI Lion      nicotine polacrilex  2 mg Oral Q4H PRN Bora Rosario MD      OLANZapine  5 mg Oral Q4H PRN Max 3/day [facility] Holter, DO      Or    OLANZapine  2.5 mg Intramuscular Q4H PRN Max 3/day [facility] Holter, DO      OLANZapine  5 mg Oral Q3H PRN Max 3/day [facility] Holter, DO      Or    OLANZapine  5 mg Intramuscular Q3H PRN Max 3/day [facility] Holter, DO      OLANZapine  2.5 mg Oral Q4H PRN Max 6/day [facility] Holter, DO      ondansetron  4 mg Oral Q6H PRN HOLLI Lion      polyethylene glycol  17 g Oral Daily HOLLI Lion      polyethylene glycol  17 g Oral Daily PRN Ion  Holter, DO      propranolol  10 mg Oral Q12H KAYLIE HOLLI Lion      senna-docusate sodium  1 tablet Oral Daily PRN Ion Dupontter, DO      senna-docusate sodium  2 tablet Oral BID HOLLI Lion      traZODone  50 mg Oral HS PRN HOLLI Lion      white petrolatum-mineral oil   Topical TID PRN HOLLI Lion       Risks / Benefits of Treatment:    Risks, benefits, and possible side effects  of medications explained to patient and patient verbalizes understanding and agreement for treatment.    Counseling / Coordination of Care:    Total floor / unit time spent today 25 minutes. Greater than 50% of total time was spent with the patient and / or family counseling and / or coordination of care. A description of counseling / coordination of care:  Patient's progress reviewed with nursing staff.  Medications, treatment progress and treatment plan reviewed with patient.  Reassurance and supportive therapy provided.  Encouraged participation in milieu and group therapy on the unit.    Axel Bowens 06/02/24

## 2024-06-02 NOTE — NURSING NOTE
No weight changes from last documented weight on 5/12. Lungs CTA. Bowel sounds present in all quadrants. Last BM today. No edema noted. B/l heels dry w/ long toenails. Denies any medical concerns.

## 2024-06-02 NOTE — NURSING NOTE
Patient is calm, pleasant, and cooperative. Visible and social w/ peers. Ate 25% of breakfast and 50% of lunch. Hygiene appears fair. Medication compliant. Attending some groups. Mild anxiety and AH present. Continuous rounding maintained.    The mother chose not to exclusively breastfeed upon admission.

## 2024-06-02 NOTE — NURSING NOTE
Alberto maintained on SAFE  precaution without incident on this shift.  Awake and alert   Attended and participated in 4 out of 7 groups today.  Continues to be compliant with medication regimen and had snack.  Alberto spend most of his time watching TV in the living room, selective peer interaction noted.  Denies any depression or anxiety.  No delusion or A/T/V hallucination noted.  Behavioral control.

## 2024-06-02 NOTE — PLAN OF CARE
Problem: Alteration in Thoughts and Perception  Goal: Treatment Goal: Gain control of psychotic behaviors/thinking, reduce/eliminate presenting symptoms and demonstrate improved reality functioning upon discharge  Outcome: Progressing  Goal: Verbalize thoughts and feelings  Description: Interventions:  - Promote a nonjudgmental and trusting relationship with the patient through active listening and therapeutic communication  - Assess patient's level of functioning, behavior and potential for risk  - Engage patient in 1 on 1 interactions  - Encourage patient to express fears, feelings, frustrations, and discuss symptoms    - [place] patient to reality, help patient recognize reality-based thinking   - Administer medications as ordered and assess for potential side effects  - Provide the patient education related to the signs and symptoms of the illness and desired effects of prescribed medications  Outcome: Progressing  Goal: Refrain from acting on delusional thinking/internal stimuli  Description: Interventions:  - Monitor patient closely, per order   - Utilize least restrictive measures   - Set reasonable limits, give positive feedback for acceptable   - Administer medications as ordered and monitor of potential side effects  Outcome: Progressing  Goal: Agree to be compliant with medication regime, as prescribed and report medication side effects  Description: Interventions:  - Offer appropriate PRN medication and supervise ingestion; conduct AIMS, as needed   Outcome: Progressing  Goal: Attend and participate in unit activities, including therapeutic, recreational, and educational groups  Description: Interventions:  -Encourage Visitation and family involvement in care  Outcome: Progressing  Goal: Recognize dysfunctional thoughts, communicate reality-based thoughts at the time of discharge  Description: Interventions:  - Provide medication and psycho-education to assist patient in compliance and developing  insight into his/her illness   Outcome: Progressing  Goal: Complete daily ADLs, including personal hygiene independently, as able  Description: Interventions:  - Observe, teach, and assist patient with ADLS  - Monitor and promote a balance of rest/activity, with adequate nutrition and elimination   Outcome: Progressing     Problem: Ineffective Coping  Goal: Identifies ineffective coping skills  Outcome: Progressing  Goal: Identifies healthy coping skills  Outcome: Progressing  Goal: Demonstrates healthy coping skills  Outcome: Progressing  Goal: Participates in unit activities  Description: Interventions:  - Provide therapeutic environment   - Provide required programming   - Redirect inappropriate behaviors   Outcome: Progressing  Goal: Patient/Family participate in treatment and DC plans  Description: Interventions:  - Provide therapeutic environment  Outcome: Progressing  Goal: Patient/Family verbalizes awareness of resources  Outcome: Progressing     Problem: Depression  Goal: Treatment Goal: Demonstrate behavioral control of depressive symptoms, verbalize feelings of improved mood/affect, and adopt new coping skills prior to discharge  Outcome: Progressing  Goal: Verbalize thoughts and feelings  Description: Interventions:  - Assess and re-assess patient's level of risk   - Engage patient in 1:1 interactions, daily, for a minimum of 15 minutes   - Encourage patient to express feelings, fears, frustrations, hopes   Outcome: Progressing  Goal: Refrain from harming self  Description: Interventions:  - Monitor patient closely, per order   - Supervise medication ingestion, monitor effects and side effects   Outcome: Progressing  Goal: Refrain from isolation  Description: Interventions:  - Develop a trusting relationship   - Encourage socialization   Outcome: Progressing  Goal: Refrain from self-neglect  Outcome: Progressing  Goal: Attend and participate in unit activities, including therapeutic, recreational, and  educational groups  Description: Interventions:  - Provide therapeutic and educational activities daily, encourage attendance and participation, and document same in the medical record   Outcome: Progressing  Goal: Complete daily ADLs, including personal hygiene independently, as able  Description: Interventions:  - Observe, teach, and assist patient with ADLS  -  Monitor and promote a balance of rest/activity, with adequate nutrition and elimination   Outcome: Progressing     Problem: Anxiety  Goal: Anxiety is at manageable level  Description: Interventions:  - Assess and monitor patient's anxiety level.   - Monitor for signs and symptoms (heart palpitations, chest pain, shortness of breath, headaches, nausea, feeling jumpy, restlessness, irritable, apprehensive).   - Collaborate with interdisciplinary team and initiate plan and interventions as ordered.  - [place] patient to unit/surroundings  - Explain treatment plan  - Encourage participation in care  - Encourage verbalization of concerns/fears  - Identify coping mechanisms  - Assist in developing anxiety-reducing skills  - Administer/offer alternative therapies  - Limit or eliminate stimulants  Outcome: Progressing     Problem: Alteration in Orientation  Goal: Treatment Goal: Demonstrate a reduction of confusion and improved orientation to person, place, time and/or situation upon discharge, according to optimum baseline/ability  Outcome: Progressing  Goal: Interact with staff daily  Description: Interventions:  - Assess and re-assess patient's level of orientation  - Engage patient in 1 on 1 interactions, daily, for a minimum of 15 minutes   - Establish rapport/trust with patient   Outcome: Progressing  Goal: Express concerns related to confused thinking related to:  Description: Interventions:  - Encourage patient to express feelings, fears, frustrations, hopes  - Assign consistent caregivers   - [place]/re-orient patient as needed  - Allow comfort items, as  appropriate  - Provide visual cues, signs, etc.   Outcome: Progressing  Goal: Allow medical examinations, as recommended  Description: Interventions:  - Provide physical/neurological exams and/or referrals, per provider   Outcome: Progressing  Goal: Cooperate with recommended testing/procedures  Description: Interventions:  - Determine need for ancillary testing  - Observe for mental status changes  - Implement falls/precaution protocol   Outcome: Progressing  Goal: Attend and participate in unit activities, including therapeutic, recreational, and educational groups  Description: Interventions:  - Provide therapeutic and educational activities daily, encourage attendance and participation, and document same in the medical record   - Provide appropriate opportunities for reminiscence   - Provide a consistent daily routine   - Encourage family contact/visitation   Outcome: Progressing  Goal: Complete daily ADLs, including personal hygiene independently, as able  Description: Interventions:  - Observe, teach, and assist patient with ADLS  Outcome: Progressing     Problem: Electroconvulsive therapy (ECT)  Goal: Treatment Goal: Demonstrate a reduction of confusion and improved orientation to person, place, time and/or situation upon discharge, according to optimum baseline/ability  Outcome: Progressing  Goal: Verbalizes/displays adequate comfort level or baseline comfort level  Description: Interventions:  - Encourage patient to monitor pain and request assistance  - Assess pain using appropriate pain scale  - Administer analgesics based on type and severity of pain and evaluate response  - Implement non-pharmacological measures as appropriate and evaluate response  - Consider cultural and social influences on pain and pain management  - Notify physician/advanced practitioner if interventions unsuccessful or patient reports new pain  Outcome: Progressing  Goal: Achieves stable or improved neurological  status  Description: INTERVENTIONS  - Monitor and report changes in neurological status  - Monitor vital signs such as temperature, blood pressure, glucose, and any other labs ordered   - Initiate measures to prevent increased intracranial pressure  - Monitor for seizure activity and implement precautions if appropriate      Outcome: Progressing  Goal: Achieves maximal functionality and self care  Description: INTERVENTIONS  - Monitor swallowing and airway patency with patient fatigue and changes in neurological status  - Encourage and assist patient to increase activity and self care.   - Encourage visually impaired, hearing impaired and aphasic patients to use assistive/communication devices  Outcome: Progressing  Goal: Maintain or return mobility to safest level of function  Description: INTERVENTIONS:  - Assess patient's ability to carry out ADLs; assess patient's baseline for ADL function and identify physical deficits which impact ability to perform ADLs (bathing, care of mouth/teeth, toileting, grooming, dressing, etc.)  - Assess/evaluate cause of self-care deficits   - Assess range of motion  - Assess patient's mobility  - Assess patient's need for assistive devices and provide as appropriate  - Encourage maximum independence but intervene and supervise when necessary  - Involve family in performance of ADLs  - Assess for home care needs following discharge   - Consider OT consult to assist with ADL evaluation and planning for discharge  - Provide patient education as appropriate  Outcome: Progressing  Goal: Absence of urinary retention  Description: INTERVENTIONS:  - Assess patient’s ability to void and empty bladder  - Monitor I/O  - Bladder scan as needed  - Discuss with physician/AP medications to alleviate retention as needed  - Discuss catheterization for long term situations as appropriate  Outcome: Progressing  Goal: Minimal or absence of nausea and/or vomiting  Description: INTERVENTIONS:  -  Administer IV fluids if ordered to ensure adequate hydration  - Maintain NPO status until nausea and vomiting are resolved  - Nasogastric tube if ordered  - Administer ordered antiemetic medications as needed  - Provide nonpharmacologic comfort measures as appropriate  - Advance diet as tolerated, if ordered  - Consider nutrition services referral to assist patient with adequate nutrition and appropriate food choices  Outcome: Progressing  Goal: Maintains adequate nutritional intake  Description: INTERVENTIONS:  - Monitor percentage of each meal consumed  - Identify factors contributing to decreased intake, treat as appropriate  - Assist with meals as needed  - Monitor I&O, weight, and lab values if indicated  - Obtain nutrition services referral as needed  Outcome: Progressing     Problem: DISCHARGE PLANNING - CARE MANAGEMENT  Goal: Discharge to post-acute care or home with appropriate resources  Description: INTERVENTIONS:  - Conduct assessment to determine patient/family and health care team treatment goals, and need for post-acute services based on payer coverage, community resources, and patient preferences, and barriers to discharge  - Address psychosocial, clinical, and financial barriers to discharge as identified in assessment in conjunction with the patient/family and health care team  - Arrange appropriate level of post-acute services according to patient’s   needs and preference and payer coverage in collaboration with the physician and health care team  - Communicate with and update the patient/family, physician, and health care team regarding progress on the discharge plan  - Arrange appropriate transportation to post-acute venues  Outcome: Progressing

## 2024-06-02 NOTE — NURSING NOTE
"Patient lying in bed w/ blankets over head. C/o being \"stressed out\" r/t bothersome AH that will kill him and his family. States AH are worse than yesterday. PRN haldol 2.5mg administered at 1745. Results pending.   "

## 2024-06-03 LAB
ALBUMIN SERPL BCG-MCNC: 4.7 G/DL (ref 3.5–5)
ALP SERPL-CCNC: 80 U/L (ref 34–104)
ALT SERPL W P-5'-P-CCNC: 72 U/L (ref 7–52)
ANION GAP SERPL CALCULATED.3IONS-SCNC: 11 MMOL/L (ref 4–13)
AST SERPL W P-5'-P-CCNC: 33 U/L (ref 13–39)
BASOPHILS # BLD AUTO: 0.07 THOUSANDS/ÂΜL (ref 0–0.1)
BASOPHILS NFR BLD AUTO: 1 % (ref 0–1)
BILIRUB SERPL-MCNC: 0.3 MG/DL (ref 0.2–1)
BUN SERPL-MCNC: 10 MG/DL (ref 5–25)
CALCIUM SERPL-MCNC: 10.1 MG/DL (ref 8.4–10.2)
CHLORIDE SERPL-SCNC: 96 MMOL/L (ref 96–108)
CK SERPL-CCNC: 323 U/L (ref 39–308)
CLOZAPINE SERPL-MCNC: 411 NG/ML (ref 350–900)
CO2 SERPL-SCNC: 29 MMOL/L (ref 21–32)
CREAT SERPL-MCNC: 1 MG/DL (ref 0.6–1.3)
CRP SERPL QL: 10 MG/L
EOSINOPHIL # BLD AUTO: 0.49 THOUSAND/ÂΜL (ref 0–0.61)
EOSINOPHIL NFR BLD AUTO: 5 % (ref 0–6)
ERYTHROCYTE [DISTWIDTH] IN BLOOD BY AUTOMATED COUNT: 15 % (ref 11.6–15.1)
GFR SERPL CREATININE-BSD FRML MDRD: 102 ML/MIN/1.73SQ M
GLUCOSE SERPL-MCNC: 97 MG/DL (ref 65–140)
HCT VFR BLD AUTO: 43.4 % (ref 36.5–49.3)
HGB BLD-MCNC: 13.6 G/DL (ref 12–17)
IMM GRANULOCYTES # BLD AUTO: 0.06 THOUSAND/UL (ref 0–0.2)
IMM GRANULOCYTES NFR BLD AUTO: 1 % (ref 0–2)
LYMPHOCYTES # BLD AUTO: 3.14 THOUSANDS/ÂΜL (ref 0.6–4.47)
LYMPHOCYTES NFR BLD AUTO: 31 % (ref 14–44)
MAGNESIUM SERPL-MCNC: 1.9 MG/DL (ref 1.9–2.7)
MCH RBC QN AUTO: 24 PG (ref 26.8–34.3)
MCHC RBC AUTO-ENTMCNC: 31.3 G/DL (ref 31.4–37.4)
MCV RBC AUTO: 77 FL (ref 82–98)
MONOCYTES # BLD AUTO: 0.85 THOUSAND/ÂΜL (ref 0.17–1.22)
MONOCYTES NFR BLD AUTO: 8 % (ref 4–12)
NEUTROPHILS # BLD AUTO: 5.48 THOUSANDS/ÂΜL (ref 1.85–7.62)
NEUTS SEG NFR BLD AUTO: 54 % (ref 43–75)
NRBC BLD AUTO-RTO: 0 /100 WBCS
PLATELET # BLD AUTO: 287 THOUSANDS/UL (ref 149–390)
PMV BLD AUTO: 11.2 FL (ref 8.9–12.7)
POTASSIUM SERPL-SCNC: 3.8 MMOL/L (ref 3.5–5.3)
PROT SERPL-MCNC: 8.5 G/DL (ref 6.4–8.4)
RBC # BLD AUTO: 5.66 MILLION/UL (ref 3.88–5.62)
SODIUM SERPL-SCNC: 136 MMOL/L (ref 135–147)
WBC # BLD AUTO: 10.09 THOUSAND/UL (ref 4.31–10.16)

## 2024-06-03 PROCEDURE — 85025 COMPLETE CBC W/AUTO DIFF WBC: CPT | Performed by: NURSE PRACTITIONER

## 2024-06-03 PROCEDURE — 80159 DRUG ASSAY CLOZAPINE: CPT | Performed by: PSYCHIATRY & NEUROLOGY

## 2024-06-03 PROCEDURE — 86140 C-REACTIVE PROTEIN: CPT | Performed by: PSYCHIATRY & NEUROLOGY

## 2024-06-03 PROCEDURE — 0HBRXZZ EXCISION OF TOE NAIL, EXTERNAL APPROACH: ICD-10-PCS | Performed by: STUDENT IN AN ORGANIZED HEALTH CARE EDUCATION/TRAINING PROGRAM

## 2024-06-03 PROCEDURE — 80053 COMPREHEN METABOLIC PANEL: CPT | Performed by: NURSE PRACTITIONER

## 2024-06-03 PROCEDURE — 83735 ASSAY OF MAGNESIUM: CPT | Performed by: NURSE PRACTITIONER

## 2024-06-03 PROCEDURE — 99232 SBSQ HOSP IP/OBS MODERATE 35: CPT | Performed by: PSYCHIATRY & NEUROLOGY

## 2024-06-03 PROCEDURE — 82550 ASSAY OF CK (CPK): CPT | Performed by: PSYCHIATRY & NEUROLOGY

## 2024-06-03 RX ADMIN — SENNOSIDES AND DOCUSATE SODIUM 2 TABLET: 8.6; 5 TABLET ORAL at 17:03

## 2024-06-03 RX ADMIN — ARIPIPRAZOLE 5 MG: 5 TABLET ORAL at 08:49

## 2024-06-03 RX ADMIN — NICOTINE POLACRILEX 2 MG: 2 GUM, CHEWING ORAL at 08:54

## 2024-06-03 RX ADMIN — FLUTICASONE PROPIONATE 1 SPRAY: 50 SPRAY, METERED NASAL at 08:53

## 2024-06-03 RX ADMIN — NICOTINE POLACRILEX 2 MG: 2 GUM, CHEWING ORAL at 21:18

## 2024-06-03 RX ADMIN — MELATONIN TAB 3 MG 3 MG: 3 TAB at 21:18

## 2024-06-03 RX ADMIN — AMLODIPINE BESYLATE 5 MG: 5 TABLET ORAL at 08:50

## 2024-06-03 RX ADMIN — PROPRANOLOL HYDROCHLORIDE 10 MG: 10 TABLET ORAL at 08:50

## 2024-06-03 RX ADMIN — CYANOCOBALAMIN TAB 1000 MCG 1000 MCG: 1000 TAB at 08:50

## 2024-06-03 RX ADMIN — ESCITALOPRAM OXALATE 20 MG: 10 TABLET, FILM COATED ORAL at 08:50

## 2024-06-03 RX ADMIN — METFORMIN HYDROCHLORIDE 500 MG: 500 TABLET, FILM COATED ORAL at 08:50

## 2024-06-03 RX ADMIN — METFORMIN HYDROCHLORIDE 500 MG: 500 TABLET, FILM COATED ORAL at 17:03

## 2024-06-03 RX ADMIN — PROPRANOLOL HYDROCHLORIDE 10 MG: 10 TABLET ORAL at 21:18

## 2024-06-03 RX ADMIN — CLOZAPINE 500 MG: 100 TABLET ORAL at 21:18

## 2024-06-03 RX ADMIN — SENNOSIDES AND DOCUSATE SODIUM 2 TABLET: 8.6; 5 TABLET ORAL at 08:50

## 2024-06-03 RX ADMIN — ONDANSETRON 4 MG: 4 TABLET, ORALLY DISINTEGRATING ORAL at 08:14

## 2024-06-03 RX ADMIN — LORATADINE 10 MG: 10 TABLET ORAL at 08:50

## 2024-06-03 RX ADMIN — HYDROCHLOROTHIAZIDE 12.5 MG: 12.5 TABLET ORAL at 08:50

## 2024-06-03 RX ADMIN — NICOTINE POLACRILEX 2 MG: 2 GUM, CHEWING ORAL at 17:27

## 2024-06-03 NOTE — CONSULTS
[facility] - Consultation    Patient Information:   Alberto Berumen 28 y.o. male MRN: [id number]  Unit/Bed#: [facility] 101-02 Encounter: [id number]  PCP: Joce Juan MD  Date of Admission:  3/29/2024  Date of Consultation: 06/03/24  Requesting Physician: Bora Rosario MD      ASSESSMENT:    Alberto Berumen is a 28 y.o. male with:    Pain of bilateral toes  Ingrown toenails of bilateral hallux  Onychomycosis toes x 10    PLAN:    Patient was seen at the bedside with all concerns addressed  Slant back procedure was performed on the medial and lateral hallux nail border bilaterally  Sharp debridement of all mycotic toenails was performed utilizing a nail nipper to normal length and thickness.  Patient tolerated well.  Rest of care per primary team.  No indication for antibiotics or surgery from podiatry standpoint.  Podiatry will be signing off at this time.  Please reconsult as needed.      SUBJECTIVE    History of Present Illness:    Alberto Berumen is a 28 y.o. male with past medical history of GERD, schizophrenia, hypertension, and tobacco abuse who presents with bilateral hallux ingrown toenails.  He rates the pain a 4/10.  He denies any drainage or redness to the toes.  He also complains of his thickened and elongated mycotic toenails.  Denies any other complaints at this time.  Denies any fevers, chills, his breath, or chest pain.    Review of Systems:    12 point review of systems is negative unless otherwise specified in the above HPI.    Past Medical and Surgical History:     Past Medical History:   Diagnosis Date    Anemia     Chronic idiopathic constipation     GERD (gastroesophageal reflux disease)     Schizoaffective disorder (HCC)     Syringoma     T wave inversion in EKG        Past Surgical History:   Procedure Laterality Date    COLONOSCOPY         Meds/Allergies:      Medications Prior to Admission:     benztropine (COGENTIN) 0.5 mg tablet    escitalopram (LEXAPRO) 10 mg tablet     "haloperidol (HALDOL) 10 mg tablet    lithium carbonate (LITHOBID) 300 mg CR tablet    mirtazapine (REMERON) 30 mg tablet    OLANZapine (ZyPREXA) 20 MG tablet    traZODone (DESYREL) 50 mg tablet    Allergies   Allergen Reactions    Pollen Extract Sneezing       Social History:     Marital Status: Single    Substance Use History:   Social History     Substance and Sexual Activity   Alcohol Use Not Currently    Comment: pt reports he does not drink     Social History     Tobacco Use   Smoking Status Every Day    Current packs/day: 0.25    Types: Cigarettes   Smokeless Tobacco Never   Tobacco Comments    Pt reports he does not want to quit and does not want information. He declines Quitline information     Social History     Substance and Sexual Activity   Drug Use Not Currently       Family History:    History reviewed. No pertinent family history.      OBJECTIVE:    Vitals:   Blood Pressure: 136/83 (06/03/24 0758)  Pulse: 100 (06/03/24 0758)  Temperature: (!) 97.2 °F (36.2 °C) (06/03/24 0758)  Temp Source: Temporal (06/03/24 0758)  Respirations: 18 (06/03/24 0758)  Height: 5' 6\" (167.6 cm) (03/29/24 2225)  Weight - Scale: 118 kg (260 lb 6 oz) (06/02/24 0745)  SpO2: 97 % (06/03/24 0758)    Physical Exam:     General Appearance: Alert, cooperative, no distress.  HEENT: Head normocephalic, atraumatic, without obvious abnormality.  Heart: Normal rate and rhythm.  Lungs: Non-labored breathing. No respiratory distress.  Abdomen: Without distension.  Psychiatric: AAOx3  Lower Extremity:  Vascular:   DP/PT pedal pulses on the left are present. DP/PT pedal pulses on the right are present. CRT brisk at the digits. Pedal hair is present. negative edema noted at bilateral lower extremities. Skin temperature is within normal limits bilaterally.    Musculoskeletal:  MMT is 5/5 in all muscle compartments bilaterally. Passive ROM at the 1st MPJ and ankle joint are Normal bilaterally with the leg extended. Active ROM at the lesser " digits is intact. Pain on palpation of bilateral toenails. No gross deformities noted.     Dermatological:  Skin is of normal appearance, temperature, and turgor with no open lesions or ulcerations noted.  Ingrown toenails of the medial and lateral hallux nail border bilaterally.  Positive for pain on palpation.  No erythema or increased warmth.  No active drainage.  No purulence.  No acute signs of infection.  Toenails x 10 are also thickened, discolored, with subungual debris present consistent with onychomycosis.    Neurological:  Gross sensation is intact. Protective sensation is intact. Patient Denies numbness and/or paresthesias.      Additional Data:     Lab Results: I have personally reviewed pertinent labs including:    Results from last 7 days   Lab Units 05/29/24  0615   WBC Thousand/uL 9.63   HEMOGLOBIN g/dL 12.4   HEMATOCRIT % 41.7   PLATELETS Thousands/uL 255   SEGS PCT % 52   LYMPHO PCT % 30   MONO PCT % 11   EOS PCT % 5     Results from last 7 days   Lab Units 05/29/24  0615   POTASSIUM mmol/L 4.6   CHLORIDE mmol/L 100   CO2 mmol/L 25   BUN mg/dL 11   CREATININE mg/dL 0.83   CALCIUM mg/dL 9.6   ALK PHOS U/L 68   ALT U/L 60*   AST U/L 29           Cultures: I have personally reviewed pertinent cultures including:              Imaging: I have personally reviewed pertinent films in PACS.  EKG, Pathology, and Other Studies: I have personally reviewed pertinent reports.

## 2024-06-03 NOTE — QUICK NOTE
Notified by nursing regarding patient's complaint of nausea and sweating for 3 weeks associated with dry heaving.  Per nursing, patient has been eating and drinking well.  Last BM yesterday.  Psychiatric provider aware.  Will check CMP, mag, and CBC with differential.  Encourage oral intake.  Zofran as needed.

## 2024-06-03 NOTE — NURSING NOTE
Patient c/o nausea and sweating. States he has been having these symptoms intermittently for 3 weeks. Observed dry heaving in hallway w/ red and teary eyes. Patient is afebrile. Ginger ale and PRN Zofran 4mg administered at 0814. Medical notified.

## 2024-06-03 NOTE — PLAN OF CARE
Problem: Ineffective Coping  Goal: Identifies ineffective coping skills  Outcome: Progressing  Goal: Identifies healthy coping skills  Outcome: Progressing  Goal: Demonstrates healthy coping skills  Outcome: Progressing  Goal: Participates in unit activities  Description: Interventions:  - Provide therapeutic environment   - Provide required programming   - Redirect inappropriate behaviors   Outcome: Progressing   Alberto attended 62% of the groups offered last week.

## 2024-06-03 NOTE — PLAN OF CARE
Problem: Alteration in Thoughts and Perception  Goal: Verbalize thoughts and feelings  Description: Interventions:  - Promote a nonjudgmental and trusting relationship with the patient through active listening and therapeutic communication  - Assess patient's level of functioning, behavior and potential for risk  - Engage patient in 1 on 1 interactions  - Encourage patient to express fears, feelings, frustrations, and discuss symptoms    - [place] patient to reality, help patient recognize reality-based thinking   - Administer medications as ordered and assess for potential side effects  - Provide the patient education related to the signs and symptoms of the illness and desired effects of prescribed medications  Outcome: Progressing  Goal: Refrain from acting on delusional thinking/internal stimuli  Description: Interventions:  - Monitor patient closely, per order   - Utilize least restrictive measures   - Set reasonable limits, give positive feedback for acceptable   - Administer medications as ordered and monitor of potential side effects  Outcome: Progressing  Goal: Agree to be compliant with medication regime, as prescribed and report medication side effects  Description: Interventions:  - Offer appropriate PRN medication and supervise ingestion; conduct AIMS, as needed   Outcome: Progressing  Goal: Attend and participate in unit activities, including therapeutic, recreational, and educational groups  Description: Interventions:  -Encourage Visitation and family involvement in care  Outcome: Progressing  Goal: Complete daily ADLs, including personal hygiene independently, as able  Description: Interventions:  - Observe, teach, and assist patient with ADLS  - Monitor and promote a balance of rest/activity, with adequate nutrition and elimination   Outcome: Progressing     Problem: Ineffective Coping  Goal: Participates in unit activities  Description: Interventions:  - Provide therapeutic environment   -  Provide required programming   - Redirect inappropriate behaviors   Outcome: Progressing     Problem: Depression  Goal: Refrain from harming self  Description: Interventions:  - Monitor patient closely, per order   - Supervise medication ingestion, monitor effects and side effects   Outcome: Progressing  Goal: Refrain from isolation  Description: Interventions:  - Develop a trusting relationship   - Encourage socialization   Outcome: Progressing  Goal: Refrain from self-neglect  Outcome: Progressing  Goal: Complete daily ADLs, including personal hygiene independently, as able  Description: Interventions:  - Observe, teach, and assist patient with ADLS  -  Monitor and promote a balance of rest/activity, with adequate nutrition and elimination   Outcome: Progressing     Problem: Anxiety  Goal: Anxiety is at manageable level  Description: Interventions:  - Assess and monitor patient's anxiety level.   - Monitor for signs and symptoms (heart palpitations, chest pain, shortness of breath, headaches, nausea, feeling jumpy, restlessness, irritable, apprehensive).   - Collaborate with interdisciplinary team and initiate plan and interventions as ordered.  - [place] patient to unit/surroundings  - Explain treatment plan  - Encourage participation in care  - Encourage verbalization of concerns/fears  - Identify coping mechanisms  - Assist in developing anxiety-reducing skills  - Administer/offer alternative therapies  - Limit or eliminate stimulants  Outcome: Progressing

## 2024-06-03 NOTE — PROGRESS NOTES
Progress Note - Behavioral Health   Alberto Berumen 28 y.o. male MRN: [id number]  Unit/Bed#: [facility] 101-02 Encounter: [id number]    Assessment & Plan   Principal Problem:    Schizoaffective disorder, bipolar type ([facility])  Active Problems:    GERD (gastroesophageal reflux disease)    Medical clearance for psychiatric admission    Tobacco abuse    T wave inversion in EKG    Chronic idiopathic constipation    Confluent and reticulate papillomatosis    Primary hypertension    Elevated hemoglobin A1c    Bilateral lower extremity edema      Behavior over the last 24 hours:  unchanged  Sleep: normal  Appetite: normal  Medication side effects: Yes Has been experiencing drooling which he was treated for prior but did not improve.   ROS: nausea, denies vomiting.     Subjective: The patient is a 28 year old male with schizoaffective disorder bipolar type. He reports that he has been continuing to hear voices that threaten to kill him and his family. He states that the voices have not changed or increased in severity. He states he is experiencing depression that he rates a 7/10, which is slightly increased from Friday. When asked if he feels anything has been increasing his depression he says no. He continues to have a flat affect. He denies suicidal or homicidal ideation or thoughts of harming himself or others. The patient states that he has been feeling the same, mostly tired and stressed. This morning the patient reports feeling of nausea, sweating, and dry heaving. He was given Zofran which the patient stated did help but he has remained in bed to rest.     Nursing states that over the weekend the patient attended 8/8 groups on Friday and 8/8 groups on Saturday. On Friday he was social and pleasant. On Saturday he reported to nursing that he was feeling increased anxiety due to the increased auditory hallucinations. He was given Haldol for these symptoms which helped, as he was more social after.      Mental Status  Evaluation:  Appearance:  disheveled, older than stated age, and overweight. The patient is sitting and lying in his bed appearing tired. Good eye contact.   Behavior:  Patient is pleasant, cooperative, tired, and slow in responses.    Speech:  normal pitch and normal volume   Mood:  stressed   Affect:  constricted and mood-congruent   Thought Process:  normal and logical   Associations: intact associations   Thought Content:  delusions  persecutory. Paranoid ideation of someone wanting to kill him and his family. Denies new obsessions or phobias.    Perceptual Disturbances: Auditory hallucinations without commands of someone stating they will kill him and his family.    Risk Potential: Suicidal Ideations none  Homicidal Ideations none  Potential for Aggression No   Sensorium:  person, place, and time/date   Memory:  recent memory slightly impaired due to inability to recall what had for dinner last night., remote memory intact, immediate memory intact.   Consciousness:  awake and lethargic     Attention: attention span and concentration were age appropriate   Intelligence: Average intelligence   Impulse control: Does not exhibit signs of poor impulse control.    Insight:  age appropriate and good   Judgment: age appropriate and good   Gait/Station: slow   Motor Activity: no abnormal movements     Progress Toward Goals: Patient is still experiencing voices that he states are unchanged and depression that he rates a 7/10 in severity. He continues to have a flat affect. He is scheduled for ECT later this week.     Recommended Treatment: Continue with group therapy, milieu therapy and occupational therapy.      Risks, benefits and possible side effects of Medications:   Risks, benefits, and possible side effects of medications explained to patient and patient verbalizes understanding.      Medications: all current active meds have been reviewed.    Counseling / Coordination of Care  Total floor / unit time spent today  10 minutes. Greater than 50% of total time was spent with the patient and / or family counseling and / or coordination of care.

## 2024-06-03 NOTE — NURSING NOTE
Alberto maintained on SAFE  precaution without incident on this shift.  Awake and alert   Attended and participated in 6 out of 8 groups today.  Continues to be compliant with medication regimen and had snack. Denies any depression, but appears very preoccupy.  No delusion or A/T/V hallucination noted.  Behavioral control

## 2024-06-03 NOTE — NURSING NOTE
Patient is calm and blunted. Visible and social. Ate 100% of dinner. No further c/o nausea. Hygiene appears poor. Encouraging fluids for labs at 1900.

## 2024-06-03 NOTE — SOCIAL WORK
"SW and pt met 1:1  Pt is requesting an in person visit from his sisters on Wednesday. SW approved visit with sisters but explained why pt's 4yo nephew cannot come to visit. Pt expressed disappointment but understanding.   Pt is requesting his SNAP card to give to his sister when she comes to visit and has requested to gain access to his phone again to check his back account because his sister is his rep payee and he wants to make sure she's putting the correct amount of money in his account and because \"people keep asking me for money to buy stuff\".   SW strongly encouraged pt not to use funds to purchase anything for other individuals on the unit if that is being requested of him; pt denied anyone on the unit has asked him for anything.   SW inquired about pt's current presentation - flat, depressive, slow to respond. Pt reports that he's really struggling, the voices are incessant and he is feeling pretty depressed. SW assisted with processing recent concerns and encouraged pt to continue his treatment and continue to find constructive ways to keep himself occupied without perseverating.   "

## 2024-06-03 NOTE — NURSING NOTE
Patient is calm and cooperative. Visible intermittently and social. Napping during day r/t feeling unwell. Denies nausea currently. Refused breakfast and ate 100% of lunch. Medication compliant. No group attendance. Continuous rounding maintained.

## 2024-06-03 NOTE — PROGRESS NOTES
06/03/24 0732   Team Meeting   Meeting Type Daily Rounds   Team Members Present   Team Members Present Physician;Nurse;;Other (Discipline and Name)   Patient/Family Present   Patient Present No   Patient's Family Present No     In attendance:  Dr. Alex Thomas, MD Dr. Jordan Holter, DO Eveline Hunt, HOLLI Taylor, RN  Luisana Alvarado, [facility]  Carla Lux, W  MATILDA Daniel.S.    Groups: 8/8    Pt reported anxiety and that the voices were more bothersome over the weekend. Given PRN Haldol. Pt is social and pleasant with select peers. Pt needs podiatry consult.

## 2024-06-04 PROCEDURE — 99232 SBSQ HOSP IP/OBS MODERATE 35: CPT | Performed by: PSYCHIATRY & NEUROLOGY

## 2024-06-04 RX ADMIN — CYANOCOBALAMIN TAB 1000 MCG 1000 MCG: 1000 TAB at 08:43

## 2024-06-04 RX ADMIN — POLYETHYLENE GLYCOL 3350 17 G: 17 POWDER, FOR SOLUTION ORAL at 08:45

## 2024-06-04 RX ADMIN — MELATONIN TAB 3 MG 3 MG: 3 TAB at 21:09

## 2024-06-04 RX ADMIN — METFORMIN HYDROCHLORIDE 500 MG: 500 TABLET, FILM COATED ORAL at 17:07

## 2024-06-04 RX ADMIN — CLOZAPINE 500 MG: 100 TABLET ORAL at 21:09

## 2024-06-04 RX ADMIN — NICOTINE POLACRILEX 2 MG: 2 GUM, CHEWING ORAL at 11:05

## 2024-06-04 RX ADMIN — PROPRANOLOL HYDROCHLORIDE 10 MG: 10 TABLET ORAL at 21:09

## 2024-06-04 RX ADMIN — SENNOSIDES AND DOCUSATE SODIUM 2 TABLET: 8.6; 5 TABLET ORAL at 17:07

## 2024-06-04 RX ADMIN — NICOTINE POLACRILEX 2 MG: 2 GUM, CHEWING ORAL at 17:23

## 2024-06-04 RX ADMIN — METFORMIN HYDROCHLORIDE 500 MG: 500 TABLET, FILM COATED ORAL at 08:42

## 2024-06-04 RX ADMIN — ESCITALOPRAM OXALATE 20 MG: 10 TABLET, FILM COATED ORAL at 08:43

## 2024-06-04 RX ADMIN — SENNOSIDES AND DOCUSATE SODIUM 2 TABLET: 8.6; 5 TABLET ORAL at 08:43

## 2024-06-04 RX ADMIN — LORATADINE 10 MG: 10 TABLET ORAL at 08:44

## 2024-06-04 RX ADMIN — ARIPIPRAZOLE 5 MG: 5 TABLET ORAL at 08:43

## 2024-06-04 RX ADMIN — NICOTINE POLACRILEX 2 MG: 2 GUM, CHEWING ORAL at 21:23

## 2024-06-04 NOTE — PROGRESS NOTES
06/04/24 0728   Team Meeting   Meeting Type Daily Rounds   Team Members Present   Team Members Present Physician;Nurse;;Other (Discipline and Name)   Patient/Family Present   Patient Present No   Patient's Family Present No     In attendance:  Dr. Alex Thomas, MD Dr. Jordan Holter, DO Eveline Hunt, HOLLI Taylor, RN  Luisana Alvarado, \[facility]\""W  Carla Lux, W  MATILDA Daniel.S.    Groups: 6/8    Pt reports ongoing AH. No bx issues noted. Pt had podiatry consult; trimmed nails. Pt had labs completed yesterday.

## 2024-06-04 NOTE — SOCIAL WORK
BRANDY placed call to pt's sister Marleny requesting a call back to confirm a time for pt's in person visit tomorrow.

## 2024-06-04 NOTE — PLAN OF CARE
Problem: Alteration in Thoughts and Perception  Goal: Treatment Goal: Gain control of psychotic behaviors/thinking, reduce/eliminate presenting symptoms and demonstrate improved reality functioning upon discharge  Outcome: Progressing  Goal: Verbalize thoughts and feelings  Description: Interventions:  - Promote a nonjudgmental and trusting relationship with the patient through active listening and therapeutic communication  - Assess patient's level of functioning, behavior and potential for risk  - Engage patient in 1 on 1 interactions  - Encourage patient to express fears, feelings, frustrations, and discuss symptoms    - [place] patient to reality, help patient recognize reality-based thinking   - Administer medications as ordered and assess for potential side effects  - Provide the patient education related to the signs and symptoms of the illness and desired effects of prescribed medications  Outcome: Progressing  Goal: Refrain from acting on delusional thinking/internal stimuli  Description: Interventions:  - Monitor patient closely, per order   - Utilize least restrictive measures   - Set reasonable limits, give positive feedback for acceptable   - Administer medications as ordered and monitor of potential side effects  Outcome: Progressing  Goal: Attend and participate in unit activities, including therapeutic, recreational, and educational groups  Description: Interventions:  -Encourage Visitation and family involvement in care  Outcome: Progressing  Goal: Complete daily ADLs, including personal hygiene independently, as able  Description: Interventions:  - Observe, teach, and assist patient with ADLS  - Monitor and promote a balance of rest/activity, with adequate nutrition and elimination   Outcome: Progressing     Problem: Ineffective Coping  Goal: Demonstrates healthy coping skills  Outcome: Progressing  Goal: Participates in unit activities  Description: Interventions:  - Provide therapeutic  environment   - Provide required programming   - Redirect inappropriate behaviors   Outcome: Progressing     Problem: Depression  Goal: Refrain from harming self  Description: Interventions:  - Monitor patient closely, per order   - Supervise medication ingestion, monitor effects and side effects   Outcome: Progressing  Goal: Refrain from isolation  Description: Interventions:  - Develop a trusting relationship   - Encourage socialization   Outcome: Progressing  Goal: Refrain from self-neglect  Outcome: Progressing     Problem: Anxiety  Goal: Anxiety is at manageable level  Description: Interventions:  - Assess and monitor patient's anxiety level.   - Monitor for signs and symptoms (heart palpitations, chest pain, shortness of breath, headaches, nausea, feeling jumpy, restlessness, irritable, apprehensive).   - Collaborate with interdisciplinary team and initiate plan and interventions as ordered.  - [place] patient to unit/surroundings  - Explain treatment plan  - Encourage participation in care  - Encourage verbalization of concerns/fears  - Identify coping mechanisms  - Assist in developing anxiety-reducing skills  - Administer/offer alternative therapies  - Limit or eliminate stimulants  Outcome: Progressing     Problem: Alteration in Orientation  Goal: Interact with staff daily  Description: Interventions:  - Assess and re-assess patient's level of orientation  - Engage patient in 1 on 1 interactions, daily, for a minimum of 15 minutes   - Establish rapport/trust with patient   Outcome: Progressing  Goal: Attend and participate in unit activities, including therapeutic, recreational, and educational groups  Description: Interventions:  - Provide therapeutic and educational activities daily, encourage attendance and participation, and document same in the medical record   - Provide appropriate opportunities for reminiscence   - Provide a consistent daily routine   - Encourage family contact/visitation   Outcome:  Progressing  Goal: Complete daily ADLs, including personal hygiene independently, as able  Description: Interventions:  - Observe, teach, and assist patient with ADLS  Outcome: Progressing     Problem: DISCHARGE PLANNING - CARE MANAGEMENT  Goal: Discharge to post-acute care or home with appropriate resources  Description: INTERVENTIONS:  - Conduct assessment to determine patient/family and health care team treatment goals, and need for post-acute services based on payer coverage, community resources, and patient preferences, and barriers to discharge  - Address psychosocial, clinical, and financial barriers to discharge as identified in assessment in conjunction with the patient/family and health care team  - Arrange appropriate level of post-acute services according to patient’s   needs and preference and payer coverage in collaboration with the physician and health care team  - Communicate with and update the patient/family, physician, and health care team regarding progress on the discharge plan  - Arrange appropriate transportation to post-acute venues  Outcome: Progressing

## 2024-06-04 NOTE — PROGRESS NOTES
Progress Note - Behavioral Health   Alberto Berumen 28 y.o. male MRN: [id number]  Unit/Bed#: [facility] 101-02 Encounter: [id number]    Assessment & Plan   Principal Problem:    Schizoaffective disorder, bipolar type (HCC)  Active Problems:    GERD (gastroesophageal reflux disease)    Medical clearance for psychiatric admission    Tobacco abuse    T wave inversion in EKG    Chronic idiopathic constipation    Confluent and reticulate papillomatosis    Primary hypertension    Elevated hemoglobin A1c    Bilateral lower extremity edema      Behavior over the last 24 hours:  unchanged  Sleep: normal  Appetite: normal  Medication side effects: Yes He has been experiencing drooling that he was treated for prior but it has not improved.   ROS: no complaints    Subjective: Patient is a 28 year old male with schizoaffective disorder bipolar type. He reports continued symptoms of hearing voices that are threatening to kill him and his family. The voices have not increased in severity or changed. He reports feeling weak and sad. He rates his depression as a 7/10 in severity, which is unchanged from yesterday. The patient was experiencing nausea yesterday which he states has subsided once given Zofran. He was able to eat and drink normally for the rest of the day following this episode. He denies thoughts of suicide, homicide, thoughts of hurting himself, and thoughts of hurting others. Nursing reports that the patient attended 6/8 groups yesterday and denied any signs or symptoms. He had an appointment with podiatry to cut his ingrown toenails.     Mental Status Evaluation:  Appearance:  disheveled, older than stated age, and overweight.Appropriate eye contact.    Behavior:  Pleasant, cooperative, slow moving, slow in responses.    Speech:  normal pitch and normal volume   Mood:  sad   Affect:  constricted, flat, and mood-congruent   Thought Process:  normal and logical   Associations: intact associations   Thought Content:   delusions  of paranoid ideations that someone will harm him or his family.He is preoccupied by these thoughts. Denies obsessions or phobias.    Perceptual Disturbances: Auditory hallucinations without commands that state they will kill him and his family.    Risk Potential: Suicidal Ideations none  Homicidal Ideations none  Potential for Aggression No   Sensorium:  person, place, time/date, and situation   Memory:  recent memory mildly impaired, able to recall 1/3 objects from 5 minutes ago and the other 2 options if given multiple choice options, immediate memory intact. Remote memory intact.    Consciousness:  alert    Attention: attention span age appropriate   Concentration: Concentration mildly impaired. Unable to complete serial 7's from 100, but able to complete serial 3's starting from 30.    Intelligence: Appropriate fund of knowledge. For calculations, unable to complete $2.50 subtracted from $5. Able to report how many quarters and dimes are in a dollar, unable to report how many nickels are in a dollar. Ability to complete abstract similarities and problem solving intact. Vocabulary age appropriate.    Impulse control:  Does not exhibit signs of poor impulse control.    Insight:  age appropriate and good   Judgment: age appropriate and good   Gait/Station: slow   Motor Activity: no abnormal movements     Progress Toward Goals: The patient continues to hear voices that he states have been unchanged in severity. He believes that the ECT treatments have been helping him but is unsure of how his other medications are helping. He rates his depression to be a 7/10, which has remained consistent. He is scheduled for ECT this Friday.     Recommended Treatment: Continue with group therapy, milieu therapy and occupational therapy.      Risks, benefits and possible side effects of Medications:   Risks, benefits, and possible side effects of medications explained to patient and patient verbalizes understanding.       Medications: all current active meds have been reviewed.    Labs:    Most Recent Labs:   Lab Results   Component Value Date    WBC 10.09 06/03/2024    RBC 5.66 (H) 06/03/2024    HGB 13.6 06/03/2024    HCT 43.4 06/03/2024     06/03/2024    RDW 15.0 06/03/2024    NEUTROABS 5.48 06/03/2024    SODIUM 136 06/03/2024    K 3.8 06/03/2024    CL 96 06/03/2024    CO2 29 06/03/2024    BUN 10 06/03/2024    CREATININE 1.00 06/03/2024    GLUC 97 06/03/2024    GLUF 103 (H) 05/29/2024    CALCIUM 10.1 06/03/2024    AST 33 06/03/2024    ALT 72 (H) 06/03/2024    ALKPHOS 80 06/03/2024    TP 8.5 (H) 06/03/2024    ALB 4.7 06/03/2024    TBILI 0.30 06/03/2024    CHOLESTEROL 156 03/14/2024    HDL 44 03/14/2024    TRIG 133 03/14/2024    LDLCALC 85 03/14/2024    NONHDLC 112 03/14/2024    LITHIUM 0.61 01/09/2024    ENY4GWAREJEY 1.062 11/15/2023    HGBA1C 5.0 04/01/2024    EAG 97 04/01/2024     Component      Latest Ref Rng 2/9/2024 2/16/2024 2/29/2024 3/30/2024 4/15/2024 4/22/2024   CLOZAPINE LEVEL      350 - 900 ng/mL 339 (L)  492  635  636  1,363 (H)  528      Component      Latest Ref Rng 4/29/2024 5/6/2024 5/13/2024 5/20/2024 5/29/2024 6/3/2024   CLOZAPINE LEVEL      350 - 900 ng/mL 520  494  565  769  782  411       Legend:  (L) Low  (H) High    Counseling / Coordination of Care  Total floor / unit time spent today 20 minutes. Greater than 50% of total time was spent with the patient and / or family counseling and / or coordination of care.

## 2024-06-04 NOTE — NURSING NOTE
"Patient visible and social on the unit. Compliant with medications. Patient states \"I am doing ok.\"   Patient slept through breakfast. He ate 100% for lunch and dinner. Patient did not verbalize auditory hallucinations to writer.   "

## 2024-06-04 NOTE — NURSING NOTE
Alberto maintained on SAFE  precaution without incident on this shift.  Awake and alert   Attended and participated in 6 out of 8 groups today.  Obtain labs this evening CK-reactive protein, CBC. Clozapine with pending results. Continues to be compliant with medication regimen and had snack. Denies any depression, but appears very preoccupy.  No delusion or A/T/V hallucination noted.  Behavioral control

## 2024-06-05 PROCEDURE — 99232 SBSQ HOSP IP/OBS MODERATE 35: CPT | Performed by: PSYCHIATRY & NEUROLOGY

## 2024-06-05 RX ADMIN — HYDROCHLOROTHIAZIDE 12.5 MG: 12.5 TABLET ORAL at 09:08

## 2024-06-05 RX ADMIN — CLOZAPINE 500 MG: 100 TABLET ORAL at 21:12

## 2024-06-05 RX ADMIN — METFORMIN HYDROCHLORIDE 500 MG: 500 TABLET, FILM COATED ORAL at 17:04

## 2024-06-05 RX ADMIN — POLYETHYLENE GLYCOL 3350 17 G: 17 POWDER, FOR SOLUTION ORAL at 09:07

## 2024-06-05 RX ADMIN — NICOTINE POLACRILEX 2 MG: 2 GUM, CHEWING ORAL at 21:12

## 2024-06-05 RX ADMIN — CYANOCOBALAMIN TAB 1000 MCG 1000 MCG: 1000 TAB at 09:07

## 2024-06-05 RX ADMIN — NICOTINE POLACRILEX 2 MG: 2 GUM, CHEWING ORAL at 11:30

## 2024-06-05 RX ADMIN — PROPRANOLOL HYDROCHLORIDE 10 MG: 10 TABLET ORAL at 09:08

## 2024-06-05 RX ADMIN — SENNOSIDES AND DOCUSATE SODIUM 2 TABLET: 8.6; 5 TABLET ORAL at 09:08

## 2024-06-05 RX ADMIN — PROPRANOLOL HYDROCHLORIDE 10 MG: 10 TABLET ORAL at 21:12

## 2024-06-05 RX ADMIN — AMLODIPINE BESYLATE 5 MG: 5 TABLET ORAL at 09:08

## 2024-06-05 RX ADMIN — METFORMIN HYDROCHLORIDE 500 MG: 500 TABLET, FILM COATED ORAL at 09:08

## 2024-06-05 RX ADMIN — NICOTINE POLACRILEX 2 MG: 2 GUM, CHEWING ORAL at 15:28

## 2024-06-05 RX ADMIN — SENNOSIDES AND DOCUSATE SODIUM 2 TABLET: 8.6; 5 TABLET ORAL at 17:04

## 2024-06-05 RX ADMIN — MELATONIN TAB 3 MG 3 MG: 3 TAB at 21:12

## 2024-06-05 RX ADMIN — ESCITALOPRAM OXALATE 20 MG: 10 TABLET, FILM COATED ORAL at 09:08

## 2024-06-05 RX ADMIN — LORATADINE 10 MG: 10 TABLET ORAL at 09:08

## 2024-06-05 RX ADMIN — ARIPIPRAZOLE 5 MG: 5 TABLET ORAL at 09:08

## 2024-06-05 RX ADMIN — FLUTICASONE PROPIONATE 1 SPRAY: 50 SPRAY, METERED NASAL at 13:17

## 2024-06-05 NOTE — NURSING NOTE
Patient is visible and social on the unit. He rates depression 6/10. States that he has been feeling more stressed. Patient compliant medications. Heart rate was noted elevated earlier however improved during the day. Patient has not showered today and is dressed in the same clothes as yesterday.  Patient attended groups.

## 2024-06-05 NOTE — PLAN OF CARE
Problem: Ineffective Coping  Goal: Identifies ineffective coping skills  Outcome: Progressing  Goal: Identifies healthy coping skills  Outcome: Progressing  Goal: Demonstrates healthy coping skills  Outcome: Progressing  Goal: Participates in unit activities  Description: Interventions:  - Provide therapeutic environment   - Provide required programming   - Redirect inappropriate behaviors   Outcome: Progressing   Alberto attended 11/26 groups offered during the last 2 days.

## 2024-06-05 NOTE — PROGRESS NOTES
06/05/24 1100   Activity/Group Checklist   Group Wellness   Attendance Attended   Attendance Duration (min) 31-45   Interactions Disorganized interaction   Affect/Mood Appropriate   Goals Achieved Able to engage in interactions;Able to listen to others;Able to self-disclose     Alberto was redirected at the start of group for touching female peer hair and was reminded to remember his boundaries.

## 2024-06-05 NOTE — SOCIAL WORK
SW placed additional call to pt's sister Marleny attempting to confirm in person visit today at 2pm. Requested call back.     BRANDY placed third call and received a response - sister is unable to attend visit due to not having care for her 6yo son.

## 2024-06-05 NOTE — PROGRESS NOTES
06/05/24 0721   Team Meeting   Meeting Type Daily Rounds   Team Members Present   Team Members Present Physician;Nurse;;Other (Discipline and Name)   Patient/Family Present   Patient Present No   Patient's Family Present No     In attendance:  Dr. Alex Thomas, MD Dr. Jordan Holter, DO vEeline Hunt, HOLLI Taylor, RN  Luisana Alvarado, [facility]  Carla Lux, W  MATILDA Daniel.SFreddie    Groups: 7/10    Pt had elevated heart rate a few times yesterday. Pt had virtual visit that went well. Pt reports showering but remains malodorous. Pt is selectively social.

## 2024-06-05 NOTE — PROGRESS NOTES
Progress Note - Behavioral Health   Alberto Berumen 28 y.o. male MRN: [id number]  Unit/Bed#: [facility] 101-02 Encounter: [id number]    Assessment & Plan   Principal Problem:    Schizoaffective disorder, bipolar type ([facility])  Active Problems:    GERD (gastroesophageal reflux disease)    Medical clearance for psychiatric admission    Tobacco abuse    T wave inversion in EKG    Chronic idiopathic constipation    Confluent and reticulate papillomatosis    Primary hypertension    Elevated hemoglobin A1c    Bilateral lower extremity edema      Behavior over the last 24 hours:  unchanged  Sleep: normal  Appetite: normal  Medication side effects: Yes, the patient experiences drooling which he was treated for prior and has not improved.   ROS: The patient reports mild occasional nausea, but has not asked for Zofran or any treatment for his symptoms.    Subjective: The patient is a 28 year old male with schizoaffective disorder bipolar type. He reports that he continues to hear voices that vary in amplitude and severity. He reports that the voices threaten to kill him and his family. He states that he has been feeling stressed. He additionally experiences delusions that someone is out to get him and his family. He currently rates his depression symptoms to be a 6.5-7/10 in severity. He denies any current suicidal or homicidal ideation. The patient states that he has been feeling especially tired and waking up later than usual.   Nursing states that the patient has had an elevated heart rate yesterday afternoon and evening and a heart rate of 117 this morning. He had a virtual visit with his brother yesterday which went well and he states that he has an in person visit with his sister tomorrow. Nursing states that the patient is showering, however he has been malodorous. They additionally reports that the patient has been visible, social, and attended 7/10 groups.     Mental Status Evaluation:  Appearance:  disheveled, older than  stated age, and overweight. Good eye contact.   Behavior:  Cooperative, pleasant, slow in movements and responses to questions.     Speech:  normal pitch and normal volume   Mood:  Stressed   Affect:  constricted and mood-congruent   Thought Process:  normal and logical   Associations: intact associations   Thought Content:  delusions  persecutory. Delusions that someone is going to kill him or his family. Denies obsessions or phobias.    Perceptual Disturbances: Auditory hallucinations without commands. Voices are telling him that they will kill him and his family.    Risk Potential: Suicidal Ideations none  Homicidal Ideations none  Potential for Aggression No   Sensorium:  person, place, and time/date   Memory:  recent memory slightly impaired, able to recall what he had for breakfast, able to recall 1/3 of words after 5 minutes and the other 2 words with multiple choice options, remote memory intact, immediate memory intact   Consciousness:  alert    Attention: attention span and concentration were age appropriate   Intelligence Mildly impaired, has difficulty with simple calculations. Is able to answer problem solving question with assistance.    Impulse control Does not exhibit signs of poor impulse control.    Insight:  age appropriate and good   Judgment: age appropriate and good   Gait/Station: slow   Motor Activity: no abnormal movements     Progress Toward Goals: The patient continues to experience auditory hallucinations and depressive symptoms rates a 6.5-7/10. He reports that the ECTs are helping him and has one scheduled for this Friday. He believes that his medications have slightly been helping him.     Recommended Treatment: Continue with group therapy, milieu therapy and occupational therapy.      Risks, benefits and possible side effects of Medications:   Risks, benefits, and possible side effects of medications explained to patient and patient verbalizes understanding.      Medications: all  current active meds have been reviewed.    Counseling / Coordination of Care  Total floor / unit time spent today 20 minutes. Greater than 50% of total time was spent with the patient and / or family counseling and / or coordination of care.

## 2024-06-05 NOTE — PLAN OF CARE
Problem: Alteration in Thoughts and Perception  Goal: Treatment Goal: Gain control of psychotic behaviors/thinking, reduce/eliminate presenting symptoms and demonstrate improved reality functioning upon discharge  Outcome: Progressing  Goal: Verbalize thoughts and feelings  Description: Interventions:  - Promote a nonjudgmental and trusting relationship with the patient through active listening and therapeutic communication  - Assess patient's level of functioning, behavior and potential for risk  - Engage patient in 1 on 1 interactions  - Encourage patient to express fears, feelings, frustrations, and discuss symptoms    - [place] patient to reality, help patient recognize reality-based thinking   - Administer medications as ordered and assess for potential side effects  - Provide the patient education related to the signs and symptoms of the illness and desired effects of prescribed medications  Outcome: Progressing  Goal: Refrain from acting on delusional thinking/internal stimuli  Description: Interventions:  - Monitor patient closely, per order   - Utilize least restrictive measures   - Set reasonable limits, give positive feedback for acceptable   - Administer medications as ordered and monitor of potential side effects  Outcome: Progressing  Goal: Agree to be compliant with medication regime, as prescribed and report medication side effects  Description: Interventions:  - Offer appropriate PRN medication and supervise ingestion; conduct AIMS, as needed   Outcome: Progressing  Goal: Attend and participate in unit activities, including therapeutic, recreational, and educational groups  Description: Interventions:  -Encourage Visitation and family involvement in care  Outcome: Progressing  Goal: Recognize dysfunctional thoughts, communicate reality-based thoughts at the time of discharge  Description: Interventions:  - Provide medication and psycho-education to assist patient in compliance and developing  insight into his/her illness   Outcome: Progressing  Goal: Complete daily ADLs, including personal hygiene independently, as able  Description: Interventions:  - Observe, teach, and assist patient with ADLS  - Monitor and promote a balance of rest/activity, with adequate nutrition and elimination   Outcome: Progressing     Problem: Ineffective Coping  Goal: Identifies ineffective coping skills  Outcome: Progressing  Goal: Identifies healthy coping skills  Outcome: Progressing  Goal: Demonstrates healthy coping skills  Outcome: Progressing  Goal: Participates in unit activities  Description: Interventions:  - Provide therapeutic environment   - Provide required programming   - Redirect inappropriate behaviors   Outcome: Progressing     Problem: Depression  Goal: Treatment Goal: Demonstrate behavioral control of depressive symptoms, verbalize feelings of improved mood/affect, and adopt new coping skills prior to discharge  Outcome: Progressing  Goal: Verbalize thoughts and feelings  Description: Interventions:  - Assess and re-assess patient's level of risk   - Engage patient in 1:1 interactions, daily, for a minimum of 15 minutes   - Encourage patient to express feelings, fears, frustrations, hopes   Outcome: Progressing  Goal: Refrain from harming self  Description: Interventions:  - Monitor patient closely, per order   - Supervise medication ingestion, monitor effects and side effects   Outcome: Progressing  Goal: Refrain from isolation  Description: Interventions:  - Develop a trusting relationship   - Encourage socialization   Outcome: Progressing  Goal: Refrain from self-neglect  Outcome: Progressing  Goal: Attend and participate in unit activities, including therapeutic, recreational, and educational groups  Description: Interventions:  - Provide therapeutic and educational activities daily, encourage attendance and participation, and document same in the medical record   Outcome: Progressing  Goal: Complete  daily ADLs, including personal hygiene independently, as able  Description: Interventions:  - Observe, teach, and assist patient with ADLS  -  Monitor and promote a balance of rest/activity, with adequate nutrition and elimination   Outcome: Progressing     Problem: Anxiety  Goal: Anxiety is at manageable level  Description: Interventions:  - Assess and monitor patient's anxiety level.   - Monitor for signs and symptoms (heart palpitations, chest pain, shortness of breath, headaches, nausea, feeling jumpy, restlessness, irritable, apprehensive).   - Collaborate with interdisciplinary team and initiate plan and interventions as ordered.  - [place] patient to unit/surroundings  - Explain treatment plan  - Encourage participation in care  - Encourage verbalization of concerns/fears  - Identify coping mechanisms  - Assist in developing anxiety-reducing skills  - Administer/offer alternative therapies  - Limit or eliminate stimulants  Outcome: Progressing

## 2024-06-06 PROCEDURE — 99232 SBSQ HOSP IP/OBS MODERATE 35: CPT | Performed by: PSYCHIATRY & NEUROLOGY

## 2024-06-06 RX ADMIN — AMLODIPINE BESYLATE 5 MG: 5 TABLET ORAL at 09:54

## 2024-06-06 RX ADMIN — NICOTINE POLACRILEX 2 MG: 2 GUM, CHEWING ORAL at 09:54

## 2024-06-06 RX ADMIN — CLOZAPINE 500 MG: 100 TABLET ORAL at 21:11

## 2024-06-06 RX ADMIN — METFORMIN HYDROCHLORIDE 500 MG: 500 TABLET, FILM COATED ORAL at 09:54

## 2024-06-06 RX ADMIN — MELATONIN TAB 3 MG 3 MG: 3 TAB at 21:11

## 2024-06-06 RX ADMIN — CYANOCOBALAMIN TAB 1000 MCG 1000 MCG: 1000 TAB at 09:55

## 2024-06-06 RX ADMIN — SENNOSIDES AND DOCUSATE SODIUM 2 TABLET: 8.6; 5 TABLET ORAL at 09:54

## 2024-06-06 RX ADMIN — PROPRANOLOL HYDROCHLORIDE 10 MG: 10 TABLET ORAL at 09:54

## 2024-06-06 RX ADMIN — LORATADINE 10 MG: 10 TABLET ORAL at 09:54

## 2024-06-06 RX ADMIN — HYDROCHLOROTHIAZIDE 12.5 MG: 12.5 TABLET ORAL at 09:54

## 2024-06-06 RX ADMIN — POLYETHYLENE GLYCOL 3350 17 G: 17 POWDER, FOR SOLUTION ORAL at 09:55

## 2024-06-06 RX ADMIN — NICOTINE POLACRILEX 2 MG: 2 GUM, CHEWING ORAL at 17:04

## 2024-06-06 RX ADMIN — PROPRANOLOL HYDROCHLORIDE 10 MG: 10 TABLET ORAL at 21:18

## 2024-06-06 RX ADMIN — ARIPIPRAZOLE 5 MG: 5 TABLET ORAL at 09:54

## 2024-06-06 RX ADMIN — METFORMIN HYDROCHLORIDE 500 MG: 500 TABLET, FILM COATED ORAL at 17:04

## 2024-06-06 RX ADMIN — SENNOSIDES AND DOCUSATE SODIUM 2 TABLET: 8.6; 5 TABLET ORAL at 17:04

## 2024-06-06 RX ADMIN — NICOTINE POLACRILEX 2 MG: 2 GUM, CHEWING ORAL at 21:11

## 2024-06-06 RX ADMIN — ESCITALOPRAM OXALATE 20 MG: 10 TABLET, FILM COATED ORAL at 09:55

## 2024-06-06 NOTE — NURSING NOTE
Pt is visible on the unit and social with select peers. Consumed 50% of dinner. Took medications without incidence but refused atropine drops. Pt is pleasant and cooperative. Attended most groups. Reports continued AH that threaten to kill him and his family. No behavioral issues. VSS. Continuous safety checks maintained.

## 2024-06-06 NOTE — NURSING NOTE
Patient is pleasant , bright and cooperative with care. Pt requests nicotine gum once, minimal needs. Pt reports s/s, takes all medications without issue.

## 2024-06-06 NOTE — PLAN OF CARE
Problem: Alteration in Thoughts and Perception  Goal: Verbalize thoughts and feelings  Description: Interventions:  - Promote a nonjudgmental and trusting relationship with the patient through active listening and therapeutic communication  - Assess patient's level of functioning, behavior and potential for risk  - Engage patient in 1 on 1 interactions  - Encourage patient to express fears, feelings, frustrations, and discuss symptoms    - [place] patient to reality, help patient recognize reality-based thinking   - Administer medications as ordered and assess for potential side effects  - Provide the patient education related to the signs and symptoms of the illness and desired effects of prescribed medications  Outcome: Progressing  Goal: Agree to be compliant with medication regime, as prescribed and report medication side effects  Description: Interventions:  - Offer appropriate PRN medication and supervise ingestion; conduct AIMS, as needed   Outcome: Progressing  Goal: Recognize dysfunctional thoughts, communicate reality-based thoughts at the time of discharge  Description: Interventions:  - Provide medication and psycho-education to assist patient in compliance and developing insight into his/her illness   Outcome: Progressing  Goal: Complete daily ADLs, including personal hygiene independently, as able  Description: Interventions:  - Observe, teach, and assist patient with ADLS  - Monitor and promote a balance of rest/activity, with adequate nutrition and elimination   Outcome: Progressing     Problem: Ineffective Coping  Goal: Demonstrates healthy coping skills  Outcome: Progressing  Goal: Participates in unit activities  Description: Interventions:  - Provide therapeutic environment   - Provide required programming   - Redirect inappropriate behaviors   Outcome: Progressing     Problem: Depression  Goal: Verbalize thoughts and feelings  Description: Interventions:  - Assess and re-assess patient's level  of risk   - Engage patient in 1:1 interactions, daily, for a minimum of 15 minutes   - Encourage patient to express feelings, fears, frustrations, hopes   Outcome: Progressing  Goal: Refrain from harming self  Description: Interventions:  - Monitor patient closely, per order   - Supervise medication ingestion, monitor effects and side effects   Outcome: Progressing  Goal: Refrain from isolation  Description: Interventions:  - Develop a trusting relationship   - Encourage socialization   Outcome: Progressing  Goal: Refrain from self-neglect  Outcome: Progressing  Goal: Attend and participate in unit activities, including therapeutic, recreational, and educational groups  Description: Interventions:  - Provide therapeutic and educational activities daily, encourage attendance and participation, and document same in the medical record   Outcome: Progressing  Goal: Complete daily ADLs, including personal hygiene independently, as able  Description: Interventions:  - Observe, teach, and assist patient with ADLS  -  Monitor and promote a balance of rest/activity, with adequate nutrition and elimination   Outcome: Progressing     Problem: Anxiety  Goal: Anxiety is at manageable level  Description: Interventions:  - Assess and monitor patient's anxiety level.   - Monitor for signs and symptoms (heart palpitations, chest pain, shortness of breath, headaches, nausea, feeling jumpy, restlessness, irritable, apprehensive).   - Collaborate with interdisciplinary team and initiate plan and interventions as ordered.  - [place] patient to unit/surroundings  - Explain treatment plan  - Encourage participation in care  - Encourage verbalization of concerns/fears  - Identify coping mechanisms  - Assist in developing anxiety-reducing skills  - Administer/offer alternative therapies  - Limit or eliminate stimulants  Outcome: Progressing     Problem: Alteration in Orientation  Goal: Treatment Goal: Demonstrate a reduction of confusion and  improved orientation to person, place, time and/or situation upon discharge, according to optimum baseline/ability  Outcome: Progressing  Goal: Interact with staff daily  Description: Interventions:  - Assess and re-assess patient's level of orientation  - Engage patient in 1 on 1 interactions, daily, for a minimum of 15 minutes   - Establish rapport/trust with patient   Outcome: Progressing  Goal: Attend and participate in unit activities, including therapeutic, recreational, and educational groups  Description: Interventions:  - Provide therapeutic and educational activities daily, encourage attendance and participation, and document same in the medical record   - Provide appropriate opportunities for reminiscence   - Provide a consistent daily routine   - Encourage family contact/visitation   Outcome: Progressing     Problem: Alteration in Thoughts and Perception  Goal: Treatment Goal: Gain control of psychotic behaviors/thinking, reduce/eliminate presenting symptoms and demonstrate improved reality functioning upon discharge  Outcome: Not Progressing

## 2024-06-06 NOTE — PROGRESS NOTES
Progress Note - Behavioral Health   Alberto Berumen 28 y.o. male MRN: [id number]  Unit/Bed#: [facility] 101-02 Encounter: [id number]    Assessment & Plan   Principal Problem:    Schizoaffective disorder, bipolar type (HCC)  Active Problems:    GERD (gastroesophageal reflux disease)    Medical clearance for psychiatric admission    Tobacco abuse    T wave inversion in EKG    Chronic idiopathic constipation    Confluent and reticulate papillomatosis    Primary hypertension    Elevated hemoglobin A1c    Bilateral lower extremity edema      Behavior over the last 24 hours:  unchanged  Sleep: normal  Appetite: normal  Medication side effects: Yes Has been experiencing drooling, which he was treated for prior and has not resolved.   ROS: no complaints    Subjective: The patient is a 28 year old male with schizoaffective disorder bipolar type. The patient states that he continues to hear voices that have been unchanged, threatening to kill him and his family. He has been feeling stressed. He additionally states that he has seen shadows out of the corners of his eyes, but is not currently experiencing this. He reports feeling like someone is out to get him and his family. He denies current suicidal thoughts, but has experienced these in the past. He reports that he is tired but does not believe he is sleeping any more than usual and denies any difficulties sleeping.   Nursing reports that the patient has been having the same auditory hallucinations and that he reported increased stress over the past 24 hours. They report that the patient was pleasant and attended 6/8 groups yesterday. He had one incident at a group in which he was touching another patient's hair, however he apologized and the incident did not continue after this.     Mental Status Evaluation:  Appearance:  disheveled, older than stated age, and overweight. Appropriate eye contact. Dressed in hospital clothes.    Behavior:  Pleasant and cooperative. Slow in  moving, slow in responses.    Speech:  normal pitch and normal volume   Mood:  Stressed   Affect:  constricted, mood-congruent, and flat. Stable.    Thought Process:  normal and logical   Associations: intact associations   Thought Content:  delusions  persecutory. Thoughts that someone is going to harm him or his family. Paranoid delusions. Denies obsessions or phobias. Denies bizarre and grandiose delusions.     Perceptual Disturbances: Auditory hallucinations without commands. Hears voices that are telling him that they will kill him and his family. Denies any hallucinations or illusions at this time.    Risk Potential: Suicidal Ideations none  Homicidal Ideations none  Potential for Aggression No   Sensorium:  person, place, and time/date   Memory:  Immediate recall intact. Recent memory mildly impaired, able to recall 3 words given prior with multiple choice options. Remote memory intact.    Consciousness:  alert    Intelligence: Average intelligence.    Attention: attention span mildly impaired, unable to repeat all numbers in a phone number, misses last 3. Concentration age appropriate and intact.    Insight:  age appropriate and good   Judgment: age appropriate and good   Gait/Station: slow   Motor Activity: no abnormal movements     Progress Toward Goals: Patient continues to hear voices that he states cause him stress. He rates his depression a 6/10 in severity. He looks forward to his next ECT session, scheduled for tomorrow.     Recommended Treatment: Continue with group therapy, milieu therapy and occupational therapy.      Risks, benefits and possible side effects of Medications:   Risks, benefits, and possible side effects of medications explained to patient and patient verbalizes understanding.      Medications: all current active meds have been reviewed.      Counseling / Coordination of Care  Total floor / unit time spent today 15 minutes. Greater than 50% of total time was spent with the patient  and / or family counseling and / or coordination of care.

## 2024-06-06 NOTE — SOCIAL WORK
SW met 1:1 with pt; pt was lying down in his room but awake and alert  SW inquired about current mental state and any needs. Pt reports that he's fine just resting. Pt denied any stressors or concerns at this time.   SW encouraged pt to continue to maintain communication with his family and work toward rescheduling an in person visit so he can see them.   SW offered additional therapy but pt reported he was ok and had nothing he needed to talk through at this time.

## 2024-06-06 NOTE — PROGRESS NOTES
06/06/24 0744   Team Meeting   Meeting Type Daily Rounds   Team Members Present   Team Members Present Physician;Nurse;;Other (Discipline and Name)   Patient/Family Present   Patient Present No   Patient's Family Present No     In attendance:  Dr. Alex Thomas, MD Dr. Jordan Holter, DO Eveline Hunt, HOLLI Taylor, RN  Luisana Alvarado, [facility]  Carla Lux, W  MATILDA Daniel.S.    Groups: 6/8    Pt was inappropriate with female peer during group; pt was apologetic and redirectable. Pt has scheduled ECT tomorrow. Pt reports feeling stressed and depressed.

## 2024-06-07 ENCOUNTER — ANESTHESIA (INPATIENT)
Dept: PREOP/PACU | Facility: HOSPITAL | Age: 29
DRG: 750 | End: 2024-06-07
Payer: COMMERCIAL

## 2024-06-07 ENCOUNTER — ANESTHESIA EVENT (INPATIENT)
Dept: PREOP/PACU | Facility: HOSPITAL | Age: 29
DRG: 750 | End: 2024-06-07
Payer: COMMERCIAL

## 2024-06-07 ENCOUNTER — APPOINTMENT (INPATIENT)
Dept: PREOP/PACU | Facility: HOSPITAL | Age: 29
DRG: 750 | End: 2024-06-07
Attending: PSYCHIATRY & NEUROLOGY
Payer: COMMERCIAL

## 2024-06-07 PROCEDURE — 90870 ELECTROCONVULSIVE THERAPY: CPT | Performed by: STUDENT IN AN ORGANIZED HEALTH CARE EDUCATION/TRAINING PROGRAM

## 2024-06-07 PROCEDURE — 90870 ELECTROCONVULSIVE THERAPY: CPT

## 2024-06-07 PROCEDURE — GZB2ZZZ ELECTROCONVULSIVE THERAPY, BILATERAL-SINGLE SEIZURE: ICD-10-PCS | Performed by: STUDENT IN AN ORGANIZED HEALTH CARE EDUCATION/TRAINING PROGRAM

## 2024-06-07 RX ORDER — ETOMIDATE 2 MG/ML
INJECTION INTRAVENOUS AS NEEDED
Status: DISCONTINUED | OUTPATIENT
Start: 2024-06-07 | End: 2024-06-07

## 2024-06-07 RX ORDER — GLYCOPYRROLATE 0.2 MG/ML
INJECTION INTRAMUSCULAR; INTRAVENOUS AS NEEDED
Status: DISCONTINUED | OUTPATIENT
Start: 2024-06-07 | End: 2024-06-07

## 2024-06-07 RX ORDER — MIDAZOLAM HYDROCHLORIDE 2 MG/2ML
INJECTION, SOLUTION INTRAMUSCULAR; INTRAVENOUS AS NEEDED
Status: DISCONTINUED | OUTPATIENT
Start: 2024-06-07 | End: 2024-06-07

## 2024-06-07 RX ORDER — KETOROLAC TROMETHAMINE 30 MG/ML
INJECTION, SOLUTION INTRAMUSCULAR; INTRAVENOUS AS NEEDED
Status: DISCONTINUED | OUTPATIENT
Start: 2024-06-07 | End: 2024-06-07

## 2024-06-07 RX ORDER — SODIUM CHLORIDE, SODIUM LACTATE, POTASSIUM CHLORIDE, CALCIUM CHLORIDE 600; 310; 30; 20 MG/100ML; MG/100ML; MG/100ML; MG/100ML
INJECTION, SOLUTION INTRAVENOUS CONTINUOUS PRN
Status: DISCONTINUED | OUTPATIENT
Start: 2024-06-07 | End: 2024-06-07

## 2024-06-07 RX ORDER — LABETALOL HYDROCHLORIDE 5 MG/ML
INJECTION, SOLUTION INTRAVENOUS AS NEEDED
Status: DISCONTINUED | OUTPATIENT
Start: 2024-06-07 | End: 2024-06-07

## 2024-06-07 RX ORDER — SUCCINYLCHOLINE/SOD CL,ISO/PF 100 MG/5ML
SYRINGE (ML) INTRAVENOUS AS NEEDED
Status: DISCONTINUED | OUTPATIENT
Start: 2024-06-07 | End: 2024-06-07

## 2024-06-07 RX ORDER — HYDRALAZINE HYDROCHLORIDE 20 MG/ML
INJECTION INTRAMUSCULAR; INTRAVENOUS AS NEEDED
Status: DISCONTINUED | OUTPATIENT
Start: 2024-06-07 | End: 2024-06-07

## 2024-06-07 RX ADMIN — METFORMIN HYDROCHLORIDE 500 MG: 500 TABLET, FILM COATED ORAL at 08:40

## 2024-06-07 RX ADMIN — HYDROCHLOROTHIAZIDE 12.5 MG: 12.5 TABLET ORAL at 05:29

## 2024-06-07 RX ADMIN — METFORMIN HYDROCHLORIDE 500 MG: 500 TABLET, FILM COATED ORAL at 17:28

## 2024-06-07 RX ADMIN — GLYCOPYRROLATE 0.4 MG: 0.2 INJECTION, SOLUTION INTRAMUSCULAR; INTRAVENOUS at 06:52

## 2024-06-07 RX ADMIN — ETOMIDATE 20 MG: 2 INJECTION INTRAVENOUS at 06:56

## 2024-06-07 RX ADMIN — POLYETHYLENE GLYCOL 3350 17 G: 17 POWDER, FOR SOLUTION ORAL at 08:40

## 2024-06-07 RX ADMIN — PROPRANOLOL HYDROCHLORIDE 10 MG: 10 TABLET ORAL at 05:28

## 2024-06-07 RX ADMIN — CYANOCOBALAMIN TAB 1000 MCG 1000 MCG: 1000 TAB at 08:40

## 2024-06-07 RX ADMIN — NICOTINE POLACRILEX 2 MG: 2 GUM, CHEWING ORAL at 21:14

## 2024-06-07 RX ADMIN — HYDRALAZINE HYDROCHLORIDE 20 MG: 20 INJECTION INTRAMUSCULAR; INTRAVENOUS at 06:55

## 2024-06-07 RX ADMIN — MELATONIN TAB 3 MG 3 MG: 3 TAB at 21:13

## 2024-06-07 RX ADMIN — ESCITALOPRAM OXALATE 20 MG: 10 TABLET, FILM COATED ORAL at 08:40

## 2024-06-07 RX ADMIN — CLOZAPINE 500 MG: 100 TABLET ORAL at 21:12

## 2024-06-07 RX ADMIN — LORATADINE 10 MG: 10 TABLET ORAL at 08:40

## 2024-06-07 RX ADMIN — Medication 140 MG: at 06:56

## 2024-06-07 RX ADMIN — MIDAZOLAM HYDROCHLORIDE 2 MG: 1 INJECTION, SOLUTION INTRAMUSCULAR; INTRAVENOUS at 07:02

## 2024-06-07 RX ADMIN — LABETALOL HYDROCHLORIDE 20 MG: 5 INJECTION, SOLUTION INTRAVENOUS at 06:55

## 2024-06-07 RX ADMIN — NICOTINE POLACRILEX 2 MG: 2 GUM, CHEWING ORAL at 17:28

## 2024-06-07 RX ADMIN — PROPRANOLOL HYDROCHLORIDE 10 MG: 10 TABLET ORAL at 21:13

## 2024-06-07 RX ADMIN — ARIPIPRAZOLE 5 MG: 5 TABLET ORAL at 08:40

## 2024-06-07 RX ADMIN — SODIUM CHLORIDE, SODIUM LACTATE, POTASSIUM CHLORIDE, AND CALCIUM CHLORIDE: .6; .31; .03; .02 INJECTION, SOLUTION INTRAVENOUS at 06:25

## 2024-06-07 RX ADMIN — SENNOSIDES AND DOCUSATE SODIUM 2 TABLET: 8.6; 5 TABLET ORAL at 08:40

## 2024-06-07 RX ADMIN — KETOROLAC TROMETHAMINE 30 MG: 30 INJECTION, SOLUTION INTRAMUSCULAR; INTRAVENOUS at 06:52

## 2024-06-07 RX ADMIN — AMLODIPINE BESYLATE 5 MG: 5 TABLET ORAL at 05:26

## 2024-06-07 RX ADMIN — SENNOSIDES AND DOCUSATE SODIUM 2 TABLET: 8.6; 5 TABLET ORAL at 17:28

## 2024-06-07 NOTE — ANESTHESIA PREPROCEDURE EVALUATION
Procedure:  ELECTROCONVULSIVE THERAPY (ECT)    Relevant Problems   CARDIO   (+) Primary hypertension      GI/HEPATIC   (+) GERD (gastroesophageal reflux disease)        Physical Exam    Airway    Mallampati score: II  TM Distance: <3 FB  Neck ROM: full     Dental       Cardiovascular  Cardiovascular exam normal    Pulmonary  Pulmonary exam normal     Other Findings        Anesthesia Plan  ASA Score- 3     Anesthesia Type- general with ASA Monitors.         Additional Monitors:     Airway Plan:            Plan Factors-    Chart reviewed.   Existing labs reviewed.                   Induction- intravenous.    Postoperative Plan-     Perioperative Resuscitation Plan - Level 1 - Full Code.       Informed Consent- Anesthetic plan and risks discussed with patient.  I personally reviewed this patient with the CRNA. Discussed and agreed on the Anesthesia Plan with the CRNA..

## 2024-06-07 NOTE — NURSING NOTE
"Patient had ect this am. He tolerated well. Patient has been in bed except for meals. Patient encouraged to stay awake after lunch. He states \"I can't stay awake on ect days.\" Patient compliant with medications. Patient did not go to groups  "

## 2024-06-07 NOTE — PROCEDURES
Procedure Note - ECT  Alberto Berumen 28 y.o. male MRN: [id number]    Time out was taken with staff to confirm correct patient and correct procedure to be performed. This was maintenance ECT. Prior to starting the procedure, the patient's questions and concerns were addressed. Patient is unsure if there has been improvement in his mood/ symptoms since last session of ECT. Patient reports transient memory loss since last session of ECT. Patient agreed to continuing treatment. Stimulus dose was 100  % . Patient had a satisfactory seizure. No immediate side effects were noted after the procedure.     This procedure was performed in the presence of and under the direct supervision of Dr. Amaya.    Session Number: Maintenance    Diagnosis: Principal Problem:    Schizoaffective disorder, bipolar type ([facility])  Active Problems:    GERD (gastroesophageal reflux disease)    Medical clearance for psychiatric admission    Tobacco abuse    T wave inversion in EKG    Chronic idiopathic constipation    Confluent and reticulate papillomatosis    Primary hypertension    Elevated hemoglobin A1c    Bilateral lower extremity edema      ECT Type: Inpatient, Maintenance    Anesthesia: Etomidate :    Electrode Placement: Bilateral    Energy level:  100 %      Seizure Duration     EE Sec.    EMG : 43 Sec (visual)    Post-ictal Suppression Index: 88.5 %    Results:Clinical seizure was satisfactory, Patient tolerated ECT well      Vitals:    24 0930   BP: 123/73   Pulse: 97   Resp: 18   Temp:    SpO2: 100%        Medication Administration - last 24 hours from 2024 1338 to 2024 1338         Date/Time Order Dose Route Action Action by     2024 0841 EDT amLODIPine (NORVASC) tablet 5 mg 5 mg Oral Not Given Linsey Gibbons RN     2024 0526 EDT amLODIPine (NORVASC) tablet 5 mg 5 mg Oral Given Mireya Peasron RN     2024 2119 EDT atropine (ISOPTO ATROPINE) 1 % ophthalmic solution 1 drop 1 drop Sublingual Not  Given Carlie Hadley, MIGUEL     06/07/2024 0840 EDT escitalopram (LEXAPRO) tablet 20 mg 20 mg Oral Given Linsey Gibbons RN     06/07/2024 0840 EDT polyethylene glycol (MIRALAX) packet 17 g 17 g Oral Given Linsey Gibbons RN     06/07/2024 0842 EDT propranolol (INDERAL) tablet 10 mg 10 mg Oral Not Given Linsey Gibbons RN     06/07/2024 0528 EDT propranolol (INDERAL) tablet 10 mg 10 mg Oral Given Mireya Pearson RN     06/06/2024 2118 EDT propranolol (INDERAL) tablet 10 mg 10 mg Oral Given Carlie Hadley, MIGUEL     06/07/2024 0840 EDT senna-docusate sodium (SENOKOT S) 8.6-50 mg per tablet 2 tablet 2 tablet Oral Given Linsey Gibbons RN     06/06/2024 1704 EDT senna-docusate sodium (SENOKOT S) 8.6-50 mg per tablet 2 tablet 2 tablet Oral Given Carlie Hadley RN     06/06/2024 2111 EDT melatonin tablet 3 mg 3 mg Oral Given Carlie Hadley, MIGUEL     06/07/2024 0840 EDT cyanocobalamin (VITAMIN B-12) tablet 1,000 mcg 1,000 mcg Oral Given Linsey Gibbons RN     06/06/2024 2111 EDT cloZAPine (CLOZARIL) tablet 500 mg 500 mg Oral Given Carlie Hadley, MIGUEL     06/06/2024 2111 EDT nicotine polacrilex (NICORETTE) gum 2 mg 2 mg Oral Given Carlie Hadley RN     06/06/2024 1704 EDT nicotine polacrilex (NICORETTE) gum 2 mg 2 mg Oral Given Carlie Hadley RN     06/07/2024 0909 EDT fluticasone (FLONASE) 50 mcg/act nasal spray 1 spray 1 spray Each Nare Not Given Linsey Gibbons RN     06/07/2024 0840 EDT loratadine (CLARITIN) tablet 10 mg 10 mg Oral Given Linsey Gibbons RN     06/07/2024 0842 EDT hydroCHLOROthiazide tablet 12.5 mg 12.5 mg Oral Not Given Linsey Gibbons RN     06/07/2024 0529 EDT hydroCHLOROthiazide tablet 12.5 mg 12.5 mg Oral Given Mireya Pearson, RN     06/07/2024 0840 EDT metFORMIN (GLUCOPHAGE) tablet 500 mg 500 mg Oral Given Linsey Gibbons, RN     06/06/2024 1704 EDT metFORMIN (GLUCOPHAGE) tablet 500 mg 500 mg Oral Given Carlie Hadley, RN     06/07/2024 0840 EDT ARIPiprazole (ABILIFY) tablet 5 mg 5 mg Oral Given Linsey Gibbons,  RN     06/07/2024 0732 EDT lactated ringers infusion 0 mL/kg/hr Intravenous Stopped Cynthia Montenegro, MIGUEL     06/07/2024 0625 EDT lactated ringers infusion -- Intravenous New Bag CHRISTIAN Teresa DO 06/07/24  Psychiatry Resident, PGY- I

## 2024-06-07 NOTE — PROGRESS NOTES
06/07/24 0736   Team Meeting   Meeting Type Daily Rounds   Team Members Present   Team Members Present Physician;Nurse;;Other (Discipline and Name)   Patient/Family Present   Patient Present No   Patient's Family Present No     In attendance:  Dr. Alex Thomas, MD Dr. Jordan Holter, DO Eveline Hunt, HOLLI Taylor, RN  Luisana Alvarado, [facility]  Carla Lux, W  MATILDA Daniel.S.    Groups: 7/9    Pt reports auditory hallucinations are the same. Pt had high heart rates yesterday. Pt had ECT this morning. No bx issues noted.

## 2024-06-07 NOTE — PROGRESS NOTES
Progress Note - Behavioral Health   Alberto Berumen 28 y.o. male MRN: [id number]  Unit/Bed#: [facility] 101-02 Encounter: [id number]    Assessment & Plan   Principal Problem:    Schizoaffective disorder, bipolar type (HCC)  Active Problems:    GERD (gastroesophageal reflux disease)    Medical clearance for psychiatric admission    Tobacco abuse    T wave inversion in EKG    Chronic idiopathic constipation    Confluent and reticulate papillomatosis    Primary hypertension    Elevated hemoglobin A1c    Bilateral lower extremity edema      Behavior over the last 24 hours:  unchanged  Sleep: normal  Appetite: normal  Medication side effects: Yes The patient experiences drooling which has been treated prior and is unresolved.   ROS: no complaints    Subjective: The patient is a 28 year old male with schizoaffective disorder bipolar type. The patient states that he continues to experience voices that are threatening to kill him and his family. He states that this morning the voices are quieter than usual, but still causing him to feel stressed. He rates his depression as a 5-6/10 in severity. He received a session of ECT this morning which he feels went well. He states that he normally experiences memory loss following his ECTs, however does not believe he is experiencing this yet today. He states that he is tired from his ECT. He denies any current suicidal or homicidal thoughts.   Nursing reports that the patient continues to report auditory hallucinations yesterday. He had an increased heart rate yesterday of 109 at 2044, and 105 at 2100. They reports the patient has no behavioral concerns, was cooperative, and social throughout the day.He attended 7/9 groups.     Mental Status Evaluation:  Appearance:  disheveled, older than stated age, and overweight. In hospital clothing, poor eye contact.    Behavior:  Cooperative and pleasant, slow moving and slow in responses. Appears very sleepy during interview, keeping his head  down and eyes closed at times.     Speech:  normal pitch and normal volume   Mood:  Stressed   Affect:  constricted, flat, mood-congruent, and stable    Thought Process:  normal and logical   Associations: intact associations   Thought Content:  delusions  persecutory. Paranoid that someone is going to harm him and his family. Denies obsessions. Denies bizarre or grandiose delusions.    Perceptual Disturbances: Auditory hallucinations without commands stating that they will kill him and his family. Denies any other hallucination, illusions, personalization or realization   Risk Potential: Suicidal Ideations none  Homicidal Ideations none  Potential for Aggression No   Sensorium:  person, place, and time/date   Memory:  immediate memory intact, recent memory mildly impaired, able to remember 2/3 words from 5 minutes ago with multiple choice options, unable to remember the third. Able to remember recent events and what they had for breakfast. Remote memory intact.    Consciousness:  alert    Attention and Concentration: attention span and concentration were age appropriate. Able to spell world forwards and backwards, with one error, which he corrects himself.    Intelligence: Average intelligence.    Impulse control: Does not display signs of poor impulse control    Insight:  age appropriate and good   Judgment: age appropriate and good   Gait/Station: slow   Motor Activity: no abnormal movements     Progress Toward Goals: The patient continues to have auditory hallucinations, however he states that they have been quieter this morning following his ECT therapy. He rates his depression as a 5/10 or 6/10, a slight improvement from days prior. The patient believes his treatments are helping him.     Recommended Treatment: Continue with group therapy, milieu therapy and occupational therapy.      Risks, benefits and possible side effects of Medications:   Risks, benefits, and possible side effects of medications explained  to patient and patient verbalizes understanding.      Medications: all current active meds have been reviewed.      Counseling / Coordination of Care  Total floor / unit time spent today 15 minutes. Greater than 50% of total time was spent with the patient and / or family counseling and / or coordination of care.

## 2024-06-07 NOTE — PLAN OF CARE
Problem: Ineffective Coping  Goal: Identifies ineffective coping skills  Outcome: Progressing  Goal: Identifies healthy coping skills  Outcome: Progressing  Goal: Demonstrates healthy coping skills  Outcome: Progressing  Goal: Participates in unit activities  Description: Interventions:  - Provide therapeutic environment   - Provide required programming   - Redirect inappropriate behaviors   Outcome: Progressing   Alberto attended 11/17 groups offered during the last 2 days.

## 2024-06-07 NOTE — ANESTHESIA POSTPROCEDURE EVALUATION
Post-Op Assessment Note    CV Status:  Stable  Pain Score: 0    Pain management: adequate       Mental Status:  Alert   Hydration Status:  Stable   PONV Controlled:  Controlled   Airway Patency:  Patent     Post Op Vitals Reviewed: Yes    No anethesia notable event occurred.    Staff: CRNA               BP   184/101   Temp      Pulse  104   Resp   20   SpO2   94

## 2024-06-07 NOTE — PLAN OF CARE
Problem: Alteration in Thoughts and Perception  Goal: Treatment Goal: Gain control of psychotic behaviors/thinking, reduce/eliminate presenting symptoms and demonstrate improved reality functioning upon discharge  Outcome: Progressing  Goal: Verbalize thoughts and feelings  Description: Interventions:  - Promote a nonjudgmental and trusting relationship with the patient through active listening and therapeutic communication  - Assess patient's level of functioning, behavior and potential for risk  - Engage patient in 1 on 1 interactions  - Encourage patient to express fears, feelings, frustrations, and discuss symptoms    - [place] patient to reality, help patient recognize reality-based thinking   - Administer medications as ordered and assess for potential side effects  - Provide the patient education related to the signs and symptoms of the illness and desired effects of prescribed medications  Outcome: Progressing  Goal: Agree to be compliant with medication regime, as prescribed and report medication side effects  Description: Interventions:  - Offer appropriate PRN medication and supervise ingestion; conduct AIMS, as needed   Outcome: Progressing  Goal: Attend and participate in unit activities, including therapeutic, recreational, and educational groups  Description: Interventions:  -Encourage Visitation and family involvement in care  Outcome: Progressing  Goal: Recognize dysfunctional thoughts, communicate reality-based thoughts at the time of discharge  Description: Interventions:  - Provide medication and psycho-education to assist patient in compliance and developing insight into his/her illness   Outcome: Progressing  Goal: Complete daily ADLs, including personal hygiene independently, as able  Description: Interventions:  - Observe, teach, and assist patient with ADLS  - Monitor and promote a balance of rest/activity, with adequate nutrition and elimination   Outcome: Progressing     Problem:  Ineffective Coping  Goal: Demonstrates healthy coping skills  Outcome: Progressing  Goal: Participates in unit activities  Description: Interventions:  - Provide therapeutic environment   - Provide required programming   - Redirect inappropriate behaviors   Outcome: Progressing  Goal: Patient/Family participate in treatment and DC plans  Description: Interventions:  - Provide therapeutic environment  Outcome: Progressing     Problem: Depression  Goal: Treatment Goal: Demonstrate behavioral control of depressive symptoms, verbalize feelings of improved mood/affect, and adopt new coping skills prior to discharge  Outcome: Progressing  Goal: Verbalize thoughts and feelings  Description: Interventions:  - Assess and re-assess patient's level of risk   - Engage patient in 1:1 interactions, daily, for a minimum of 15 minutes   - Encourage patient to express feelings, fears, frustrations, hopes   Outcome: Progressing  Goal: Refrain from harming self  Description: Interventions:  - Monitor patient closely, per order   - Supervise medication ingestion, monitor effects and side effects   Outcome: Progressing  Goal: Refrain from isolation  Description: Interventions:  - Develop a trusting relationship   - Encourage socialization   Outcome: Progressing  Goal: Refrain from self-neglect  Outcome: Progressing  Goal: Attend and participate in unit activities, including therapeutic, recreational, and educational groups  Description: Interventions:  - Provide therapeutic and educational activities daily, encourage attendance and participation, and document same in the medical record   Outcome: Progressing  Goal: Complete daily ADLs, including personal hygiene independently, as able  Description: Interventions:  - Observe, teach, and assist patient with ADLS  -  Monitor and promote a balance of rest/activity, with adequate nutrition and elimination   Outcome: Progressing     Problem: Anxiety  Goal: Anxiety is at manageable  level  Description: Interventions:  - Assess and monitor patient's anxiety level.   - Monitor for signs and symptoms (heart palpitations, chest pain, shortness of breath, headaches, nausea, feeling jumpy, restlessness, irritable, apprehensive).   - Collaborate with interdisciplinary team and initiate plan and interventions as ordered.  - [place] patient to unit/surroundings  - Explain treatment plan  - Encourage participation in care  - Encourage verbalization of concerns/fears  - Identify coping mechanisms  - Assist in developing anxiety-reducing skills  - Administer/offer alternative therapies  - Limit or eliminate stimulants  Outcome: Progressing     Problem: Alteration in Orientation  Goal: Treatment Goal: Demonstrate a reduction of confusion and improved orientation to person, place, time and/or situation upon discharge, according to optimum baseline/ability  Outcome: Progressing  Goal: Attend and participate in unit activities, including therapeutic, recreational, and educational groups  Description: Interventions:  - Provide therapeutic and educational activities daily, encourage attendance and participation, and document same in the medical record   - Provide appropriate opportunities for reminiscence   - Provide a consistent daily routine   - Encourage family contact/visitation   Outcome: Progressing  Goal: Complete daily ADLs, including personal hygiene independently, as able  Description: Interventions:  - Observe, teach, and assist patient with ADLS  Outcome: Progressing     Problem: DISCHARGE PLANNING - CARE MANAGEMENT  Goal: Discharge to post-acute care or home with appropriate resources  Description: INTERVENTIONS:  - Conduct assessment to determine patient/family and health care team treatment goals, and need for post-acute services based on payer coverage, community resources, and patient preferences, and barriers to discharge  - Address psychosocial, clinical, and financial barriers to discharge as  identified in assessment in conjunction with the patient/family and health care team  - Arrange appropriate level of post-acute services according to patient’s   needs and preference and payer coverage in collaboration with the physician and health care team  - Communicate with and update the patient/family, physician, and health care team regarding progress on the discharge plan  - Arrange appropriate transportation to post-acute venues  Outcome: Progressing     Problem: Alteration in Thoughts and Perception  Goal: Refrain from acting on delusional thinking/internal stimuli  Description: Interventions:  - Monitor patient closely, per order   - Utilize least restrictive measures   - Set reasonable limits, give positive feedback for acceptable   - Administer medications as ordered and monitor of potential side effects  Outcome: Not Progressing

## 2024-06-07 NOTE — NURSING NOTE
Received pt in bed at change of shift with yes closed; chest movement noted.  Maintained on NPO for maintained ECT thi am.  Q 7 min safety checks in progress.       0605:   Transported to PACU via WC with Staff for pt's ECT procedure.

## 2024-06-08 PROCEDURE — 99232 SBSQ HOSP IP/OBS MODERATE 35: CPT | Performed by: PSYCHIATRY & NEUROLOGY

## 2024-06-08 RX ADMIN — SENNOSIDES AND DOCUSATE SODIUM 2 TABLET: 8.6; 5 TABLET ORAL at 08:35

## 2024-06-08 RX ADMIN — POLYETHYLENE GLYCOL 3350 17 G: 17 POWDER, FOR SOLUTION ORAL at 08:36

## 2024-06-08 RX ADMIN — FLUTICASONE PROPIONATE 1 SPRAY: 50 SPRAY, METERED NASAL at 08:39

## 2024-06-08 RX ADMIN — NICOTINE POLACRILEX 2 MG: 2 GUM, CHEWING ORAL at 14:48

## 2024-06-08 RX ADMIN — CYANOCOBALAMIN TAB 1000 MCG 1000 MCG: 1000 TAB at 08:35

## 2024-06-08 RX ADMIN — METFORMIN HYDROCHLORIDE 500 MG: 500 TABLET, FILM COATED ORAL at 08:35

## 2024-06-08 RX ADMIN — METFORMIN HYDROCHLORIDE 500 MG: 500 TABLET, FILM COATED ORAL at 17:14

## 2024-06-08 RX ADMIN — HYDROCHLOROTHIAZIDE 12.5 MG: 12.5 TABLET ORAL at 08:35

## 2024-06-08 RX ADMIN — LORATADINE 10 MG: 10 TABLET ORAL at 08:35

## 2024-06-08 RX ADMIN — PROPRANOLOL HYDROCHLORIDE 10 MG: 10 TABLET ORAL at 21:36

## 2024-06-08 RX ADMIN — NICOTINE POLACRILEX 2 MG: 2 GUM, CHEWING ORAL at 08:36

## 2024-06-08 RX ADMIN — PROPRANOLOL HYDROCHLORIDE 10 MG: 10 TABLET ORAL at 08:35

## 2024-06-08 RX ADMIN — ARIPIPRAZOLE 5 MG: 5 TABLET ORAL at 08:35

## 2024-06-08 RX ADMIN — ESCITALOPRAM OXALATE 20 MG: 10 TABLET, FILM COATED ORAL at 08:35

## 2024-06-08 RX ADMIN — SENNOSIDES AND DOCUSATE SODIUM 2 TABLET: 8.6; 5 TABLET ORAL at 17:13

## 2024-06-08 RX ADMIN — MELATONIN TAB 3 MG 3 MG: 3 TAB at 21:36

## 2024-06-08 RX ADMIN — AMLODIPINE BESYLATE 5 MG: 5 TABLET ORAL at 08:35

## 2024-06-08 RX ADMIN — CLOZAPINE 500 MG: 100 TABLET ORAL at 21:36

## 2024-06-08 RX ADMIN — NICOTINE POLACRILEX 2 MG: 2 GUM, CHEWING ORAL at 21:39

## 2024-06-08 NOTE — NURSING NOTE
Pt is intermittently visible on the unit and social with select peers. Consumed 100% of all meals. Took medications without incidence. Pt is pleasant and cooperative. Visible attending groups. Reports continued AH that threaten to kill him and his family. No behavioral issues. Pt offers no complaints. VSS. Continuous safety checks maintained.

## 2024-06-08 NOTE — NURSING NOTE
Alberto is out and about in the milieu. He is pleasant and calm. He states he slept most of the day because he was tired from the ECT. He is social with most the other young peers on the unit. He voices no complaints except he continues to refuse the Atropine gtt's saying they don't work but he does c/o drooling so he would like to receive an order for something to relieve this side effect and I said I would pass this along to the doctor

## 2024-06-08 NOTE — PLAN OF CARE
Problem: Alteration in Thoughts and Perception  Goal: Treatment Goal: Gain control of psychotic behaviors/thinking, reduce/eliminate presenting symptoms and demonstrate improved reality functioning upon discharge  Outcome: Progressing  Goal: Verbalize thoughts and feelings  Description: Interventions:  - Promote a nonjudgmental and trusting relationship with the patient through active listening and therapeutic communication  - Assess patient's level of functioning, behavior and potential for risk  - Engage patient in 1 on 1 interactions  - Encourage patient to express fears, feelings, frustrations, and discuss symptoms    - Harmon patient to reality, help patient recognize reality-based thinking   - Administer medications as ordered and assess for potential side effects  - Provide the patient education related to the signs and symptoms of the illness and desired effects of prescribed medications  Outcome: Progressing  Goal: Refrain from acting on delusional thinking/internal stimuli  Description: Interventions:  - Monitor patient closely, per order   - Utilize least restrictive measures   - Set reasonable limits, give positive feedback for acceptable   - Administer medications as ordered and monitor of potential side effects  Outcome: Progressing  Goal: Agree to be compliant with medication regime, as prescribed and report medication side effects  Description: Interventions:  - Offer appropriate PRN medication and supervise ingestion; conduct AIMS, as needed   Outcome: Progressing  Goal: Attend and participate in unit activities, including therapeutic, recreational, and educational groups  Description: Interventions:  -Encourage Visitation and family involvement in care  Outcome: Progressing  Goal: Recognize dysfunctional thoughts, communicate reality-based thoughts at the time of discharge  Description: Interventions:  - Provide medication and psycho-education to assist patient in compliance and developing  insight into his/her illness   Outcome: Progressing  Goal: Complete daily ADLs, including personal hygiene independently, as able  Description: Interventions:  - Observe, teach, and assist patient with ADLS  - Monitor and promote a balance of rest/activity, with adequate nutrition and elimination   Outcome: Progressing     Problem: Ineffective Coping  Goal: Demonstrates healthy coping skills  Outcome: Progressing  Goal: Participates in unit activities  Description: Interventions:  - Provide therapeutic environment   - Provide required programming   - Redirect inappropriate behaviors   Outcome: Progressing     Problem: Depression  Goal: Treatment Goal: Demonstrate behavioral control of depressive symptoms, verbalize feelings of improved mood/affect, and adopt new coping skills prior to discharge  Outcome: Progressing  Goal: Refrain from harming self  Description: Interventions:  - Monitor patient closely, per order   - Supervise medication ingestion, monitor effects and side effects   Outcome: Progressing  Goal: Refrain from isolation  Description: Interventions:  - Develop a trusting relationship   - Encourage socialization   Outcome: Progressing  Goal: Refrain from self-neglect  Outcome: Progressing  Goal: Attend and participate in unit activities, including therapeutic, recreational, and educational groups  Description: Interventions:  - Provide therapeutic and educational activities daily, encourage attendance and participation, and document same in the medical record   Outcome: Progressing  Goal: Complete daily ADLs, including personal hygiene independently, as able  Description: Interventions:  - Observe, teach, and assist patient with ADLS  -  Monitor and promote a balance of rest/activity, with adequate nutrition and elimination   Outcome: Progressing     Problem: Anxiety  Goal: Anxiety is at manageable level  Description: Interventions:  - Assess and monitor patient's anxiety level.   - Monitor for signs and  symptoms (heart palpitations, chest pain, shortness of breath, headaches, nausea, feeling jumpy, restlessness, irritable, apprehensive).   - Collaborate with interdisciplinary team and initiate plan and interventions as ordered.  - Jbphh patient to unit/surroundings  - Explain treatment plan  - Encourage participation in care  - Encourage verbalization of concerns/fears  - Identify coping mechanisms  - Assist in developing anxiety-reducing skills  - Administer/offer alternative therapies  - Limit or eliminate stimulants  Outcome: Progressing     Problem: Alteration in Orientation  Goal: Treatment Goal: Demonstrate a reduction of confusion and improved orientation to person, place, time and/or situation upon discharge, according to optimum baseline/ability  Outcome: Progressing  Goal: Interact with staff daily  Description: Interventions:  - Assess and re-assess patient's level of orientation  - Engage patient in 1 on 1 interactions, daily, for a minimum of 15 minutes   - Establish rapport/trust with patient   Outcome: Progressing     Problem: Electroconvulsive therapy (ECT)  Goal: Treatment Goal: Demonstrate a reduction of confusion and improved orientation to person, place, time and/or situation upon discharge, according to optimum baseline/ability  Outcome: Progressing  Goal: Verbalizes/displays adequate comfort level or baseline comfort level  Description: Interventions:  - Encourage patient to monitor pain and request assistance  - Assess pain using appropriate pain scale  - Administer analgesics based on type and severity of pain and evaluate response  - Implement non-pharmacological measures as appropriate and evaluate response  - Consider cultural and social influences on pain and pain management  - Notify physician/advanced practitioner if interventions unsuccessful or patient reports new pain  Outcome: Progressing  Goal: Achieves stable or improved neurological status  Description: INTERVENTIONS  - Monitor  and report changes in neurological status  - Monitor vital signs such as temperature, blood pressure, glucose, and any other labs ordered   - Initiate measures to prevent increased intracranial pressure  - Monitor for seizure activity and implement precautions if appropriate      Outcome: Progressing  Goal: Maintain or return mobility to safest level of function  Description: INTERVENTIONS:  - Assess patient's ability to carry out ADLs; assess patient's baseline for ADL function and identify physical deficits which impact ability to perform ADLs (bathing, care of mouth/teeth, toileting, grooming, dressing, etc.)  - Assess/evaluate cause of self-care deficits   - Assess range of motion  - Assess patient's mobility  - Assess patient's need for assistive devices and provide as appropriate  - Encourage maximum independence but intervene and supervise when necessary  - Involve family in performance of ADLs  - Assess for home care needs following discharge   - Consider OT consult to assist with ADL evaluation and planning for discharge  - Provide patient education as appropriate  Outcome: Progressing  Goal: Absence of urinary retention  Description: INTERVENTIONS:  - Assess patient’s ability to void and empty bladder  - Monitor I/O  - Bladder scan as needed  - Discuss with physician/AP medications to alleviate retention as needed  - Discuss catheterization for long term situations as appropriate  Outcome: Progressing  Goal: Minimal or absence of nausea and/or vomiting  Description: INTERVENTIONS:  - Administer IV fluids if ordered to ensure adequate hydration  - Maintain NPO status until nausea and vomiting are resolved  - Nasogastric tube if ordered  - Administer ordered antiemetic medications as needed  - Provide nonpharmacologic comfort measures as appropriate  - Advance diet as tolerated, if ordered  - Consider nutrition services referral to assist patient with adequate nutrition and appropriate food choices  Outcome:  Progressing  Goal: Maintains adequate nutritional intake  Description: INTERVENTIONS:  - Monitor percentage of each meal consumed  - Identify factors contributing to decreased intake, treat as appropriate  - Assist with meals as needed  - Monitor I&O, weight, and lab values if indicated  - Obtain nutrition services referral as needed  Outcome: Progressing     Problem: DISCHARGE PLANNING - CARE MANAGEMENT  Goal: Discharge to post-acute care or home with appropriate resources  Description: INTERVENTIONS:  - Conduct assessment to determine patient/family and health care team treatment goals, and need for post-acute services based on payer coverage, community resources, and patient preferences, and barriers to discharge  - Address psychosocial, clinical, and financial barriers to discharge as identified in assessment in conjunction with the patient/family and health care team  - Arrange appropriate level of post-acute services according to patient’s   needs and preference and payer coverage in collaboration with the physician and health care team  - Communicate with and update the patient/family, physician, and health care team regarding progress on the discharge plan  - Arrange appropriate transportation to post-acute venues  Outcome: Progressing

## 2024-06-08 NOTE — PROGRESS NOTES
Psychiatric Progress Note - Department of Behavioral Health   Alberto Berumen 28 y.o. male MRN: [id number]  Unit/Bed#: [facility] 101-02 Encounter: [id number]    ASSESSMENT & PLAN     Diagnoses:   Principal Problem:    Schizoaffective disorder, bipolar type (HCC)  Active Problems:    GERD (gastroesophageal reflux disease)    Medical clearance for psychiatric admission    Tobacco abuse    T wave inversion in EKG    Chronic idiopathic constipation    Confluent and reticulate papillomatosis    Primary hypertension    Elevated hemoglobin A1c    Bilateral lower extremity edema      Treatment Recommendations/Precautions:  Continue to promote patient participation in therapeutic milieu.  Continue medical management per medicine.  Continue previously prescribed psychotropic medication regimen; see below.  Continue behavioral health checks q.7 minutes.   Continue vitals per behavioral health unit protocol.  Discharge date per primary team; 201 commitment status.    SUBJECTIVE     Patient evaluated this a.m. for continuity of care. Patient was discussed at length with nursing and treatment team. Per nursing, patient remains calm, cooperative, intermittently interactive in the milieu, adherent to his medications without any acute adverse effects, appearing to tolerate electroconvulsive therapy without incident. No acute distress is noted throughout evaluation. Alberto Berumen denies suicidal/homicidal ideation in addition to thoughts of self-injury, receptive to crisis planning provided by this writer, contacting for safety in the inpatient setting, admitting to an ability to appropriately confide in staff including this writer.  Patient appears scant, sparse, minimally interactive involving this writer, superficial, somewhat suspicious and paranoid, alluding to auditory hallucinations that are negative and derogatory in content pertaining to killing him and his relatives, denying any additional psychiatric  complaints/concerns    PSYCHIATRIC REVIEW OF SYSTEMS     Behavior over the last 24 hours:  unchanged  Sleep: adequate  Appetite: adequate  Medication side effects: No    REVIEW OF SYSTEMS   Review of systems: no complaints    OBJECTIVE     Vital Signs in Past 24 Hours:  Temp:  [97.7 °F (36.5 °C)-97.9 °F (36.6 °C)] 97.9 °F (36.6 °C)  HR:  [] 89  Resp:  [18] 18  BP: (120-134)/(62-85) 120/66    Intake/Output in Past 24 hours:  No intake/output data recorded.  No intake/output data recorded.        Laboratory Results:  I have personally reviewed all pertinent laboratory/tests results.    Behavioral Health Medications: all current active meds have been reviewed, continue current psychiatric medications, and current meds:   Current Facility-Administered Medications   Medication Dose Route Frequency    acetaminophen (TYLENOL) tablet 650 mg  650 mg Oral Q4H PRN    acetaminophen (TYLENOL) tablet 650 mg  650 mg Oral Q6H PRN    aluminum-magnesium hydroxide-simethicone (MAALOX) oral suspension 30 mL  30 mL Oral Q4H PRN    amLODIPine (NORVASC) tablet 5 mg  5 mg Oral Daily    ARIPiprazole (ABILIFY) tablet 5 mg  5 mg Oral Daily    Artificial Tears ophthalmic solution 1 drop  1 drop Both Eyes Q3H PRN    atropine (ISOPTO ATROPINE) 1 % ophthalmic solution 1 drop  1 drop Sublingual HS    atropine (ISOPTO ATROPINE) 1 % ophthalmic solution 1 drop  1 drop Sublingual Daily PRN    haloperidol lactate (HALDOL) injection 2.5 mg  2.5 mg Intramuscular Q4H PRN Max 4/day    And    LORazepam (ATIVAN) injection 1 mg  1 mg Intramuscular Q4H PRN Max 4/day    And    benztropine (COGENTIN) injection 0.5 mg  0.5 mg Intramuscular Q4H PRN Max 4/day    benztropine (COGENTIN) injection 1 mg  1 mg Intramuscular Q4H PRN Max 6/day    haloperidol lactate (HALDOL) injection 5 mg  5 mg Intramuscular Q4H PRN Max 4/day    And    LORazepam (ATIVAN) injection 2 mg  2 mg Intramuscular Q4H PRN Max 4/day    And    benztropine (COGENTIN) injection 1 mg  1 mg  Intramuscular Q4H PRN Max 4/day    benztropine (COGENTIN) tablet 1 mg  1 mg Oral Q4H PRN Max 6/day    benztropine (COGENTIN) tablet 1 mg  1 mg Oral Q4H PRN Max 6/day    bisacodyl (DULCOLAX) rectal suppository 10 mg  10 mg Rectal Daily PRN    cloZAPine (CLOZARIL) tablet 500 mg  500 mg Oral HS    cyanocobalamin (VITAMIN B-12) tablet 1,000 mcg  1,000 mcg Oral Daily    hydrOXYzine HCL (ATARAX) tablet 50 mg  50 mg Oral Q6H PRN Max 4/day    Or    diphenhydrAMINE (BENADRYL) injection 50 mg  50 mg Intramuscular Q6H PRN    hydrOXYzine HCL (ATARAX) tablet 50 mg  50 mg Oral Q6H PRN Max 4/day    Or    diphenhydrAMINE (BENADRYL) injection 50 mg  50 mg Intramuscular Q6H PRN    diphenhydrAMINE-zinc acetate (BENADRYL) 2-0.1 % cream   Topical BID PRN    escitalopram (LEXAPRO) tablet 20 mg  20 mg Oral Daily    fluticasone (FLONASE) 50 mcg/act nasal spray 1 spray  1 spray Each Nare Daily    haloperidol (HALDOL) tablet 1 mg  1 mg Oral Q6H PRN    haloperidol (HALDOL) tablet 2.5 mg  2.5 mg Oral Q4H PRN Max 4/day    haloperidol (HALDOL) tablet 5 mg  5 mg Oral Q4H PRN Max 4/day    hydroCHLOROthiazide tablet 12.5 mg  12.5 mg Oral Daily    hydrocortisone 1 % ointment   Topical 4x Daily PRN    hydrOXYzine HCL (ATARAX) tablet 100 mg  100 mg Oral Q6H PRN Max 4/day    Or    LORazepam (ATIVAN) injection 2 mg  2 mg Intramuscular Q6H PRN    hydrOXYzine HCL (ATARAX) tablet 100 mg  100 mg Oral Q6H PRN Max 4/day    Or    LORazepam (ATIVAN) injection 2 mg  2 mg Intramuscular Q6H PRN    hydrOXYzine HCL (ATARAX) tablet 25 mg  25 mg Oral Q6H PRN Max 4/day    ibuprofen (MOTRIN) tablet 600 mg  600 mg Oral Q8H PRN    loratadine (CLARITIN) tablet 10 mg  10 mg Oral Daily    melatonin tablet 3 mg  3 mg Oral HS    metFORMIN (GLUCOPHAGE) tablet 500 mg  500 mg Oral BID With Meals    methocarbamol (ROBAXIN) tablet 500 mg  500 mg Oral Q6H PRN    nicotine polacrilex (NICORETTE) gum 2 mg  2 mg Oral Q4H PRN    OLANZapine (ZyPREXA) tablet 5 mg  5 mg Oral Q4H PRN Max  3/day    Or    OLANZapine (ZyPREXA) IM injection 2.5 mg  2.5 mg Intramuscular Q4H PRN Max 3/day    OLANZapine (ZyPREXA) tablet 5 mg  5 mg Oral Q3H PRN Max 3/day    Or    OLANZapine (ZyPREXA) IM injection 5 mg  5 mg Intramuscular Q3H PRN Max 3/day    OLANZapine (ZyPREXA) tablet 2.5 mg  2.5 mg Oral Q4H PRN Max 6/day    ondansetron (ZOFRAN-ODT) dispersible tablet 4 mg  4 mg Oral Q6H PRN    polyethylene glycol (MIRALAX) packet 17 g  17 g Oral Daily    polyethylene glycol (MIRALAX) packet 17 g  17 g Oral Daily PRN    propranolol (INDERAL) tablet 10 mg  10 mg Oral Q12H KAYLIE    senna-docusate sodium (SENOKOT S) 8.6-50 mg per tablet 1 tablet  1 tablet Oral Daily PRN    senna-docusate sodium (SENOKOT S) 8.6-50 mg per tablet 2 tablet  2 tablet Oral BID    traZODone (DESYREL) tablet 50 mg  50 mg Oral HS PRN    white petrolatum-mineral oil (EUCERIN,HYDROCERIN) cream   Topical TID PRN   .    Risks, benefits and possible side effects of Medications:   Risks, benefits, and possible side effects of medications explained to patient and patient verbalizes understanding.      Mental Status Evaluation:  Appearance:  age appropriate, casually dressed, disheveled, and marginal grooming/hygiene, somewhat malodorous   Behavior:  psychomotor retardation, calm and cooperative although superficial, somewhat suspicious possessing intermittent eye contact   Speech:  soft and scant   Mood:  euthymic   Affect:  blunted and mood-incongruent   Language sparse   Thought Process:  goal directed   Thought Content:  delusions  persecutory, paranoid   Perceptual Disturbances: Auditory hallucinations without commands appearing internally preoccupied   Risk Potential: Suicidal Ideations none, Homicidal Ideations none, and Potential for Aggression No   Sensorium:  person, place, and time/date   Cognition:  recent and remote memory grossly intact   Consciousness:  alert and awake    Attention: attention span appeared shorter than expected for age   Insight:   limited   Judgment: limited   Intellect Not assessed   Gait/Station: Not assessed; sitting in bed   Motor Activity: no abnormal movements     Memory: Short and long term memory fair     Progress Toward Goals: Unchanged, as evidenced by their participation (or lack thereof) in individual, social and therapeutic milieu in addition to adherence to their medication regimen    Recommended Treatment:   See above for assessment and plan.    Inpatient Psychiatric Certification: Based upon physical, mental and social evaluations, I certify that inpatient psychiatric services are medically necessary for this patient for a duration of greater than 2 midnights for the treatment of Schizoaffective disorder, bipolar type (HCC) including psychotropic medication management, participation in the therapeutic milieu and referrals as indicated. Available alternative community resources do not meet the patient's mental health care needs. I further attest that an established written individualized plan of care has been implemented and is outlined in the patient's medical records.    Counseling/Coordination of Care    Total unit time spent today was greater than 15 minutes. Greater than 50% of total time was spent with the patient and/or patient's relatives and/or coordination of patient's care.     Patient's rights, confidentiality, exceptions to confidentiality, use of electronic medical record including appropriate staff access to medical record regarding behavioral health services and consent to treatment were reviewed.    Note Share:     This note was not shared with the patient due to reasonable likelihood of causing patient harm     This note has been constructed using a voice recognition system.    There may be translation, syntax, or grammatical errors. If you have any questions, please contact the dictating provider.    Jordan Christopher Holter, DO 06/08/24

## 2024-06-09 VITALS
TEMPERATURE: 97.8 F | HEART RATE: 101 BPM | DIASTOLIC BLOOD PRESSURE: 79 MMHG | SYSTOLIC BLOOD PRESSURE: 131 MMHG | WEIGHT: 260.38 LBS | OXYGEN SATURATION: 99 % | RESPIRATION RATE: 18 BRPM | HEIGHT: 66 IN | BODY MASS INDEX: 41.85 KG/M2

## 2024-06-09 PROCEDURE — 99232 SBSQ HOSP IP/OBS MODERATE 35: CPT | Performed by: PSYCHIATRY & NEUROLOGY

## 2024-06-09 RX ADMIN — METFORMIN HYDROCHLORIDE 500 MG: 500 TABLET, FILM COATED ORAL at 08:34

## 2024-06-09 RX ADMIN — PROPRANOLOL HYDROCHLORIDE 10 MG: 10 TABLET ORAL at 09:09

## 2024-06-09 RX ADMIN — SENNOSIDES AND DOCUSATE SODIUM 2 TABLET: 8.6; 5 TABLET ORAL at 08:34

## 2024-06-09 RX ADMIN — NICOTINE POLACRILEX 2 MG: 2 GUM, CHEWING ORAL at 13:01

## 2024-06-09 RX ADMIN — CLOZAPINE 500 MG: 100 TABLET ORAL at 21:27

## 2024-06-09 RX ADMIN — FLUTICASONE PROPIONATE 1 SPRAY: 50 SPRAY, METERED NASAL at 08:36

## 2024-06-09 RX ADMIN — ESCITALOPRAM OXALATE 20 MG: 10 TABLET, FILM COATED ORAL at 08:34

## 2024-06-09 RX ADMIN — ARIPIPRAZOLE 5 MG: 5 TABLET ORAL at 08:35

## 2024-06-09 RX ADMIN — NICOTINE POLACRILEX 2 MG: 2 GUM, CHEWING ORAL at 17:02

## 2024-06-09 RX ADMIN — LORATADINE 10 MG: 10 TABLET ORAL at 08:35

## 2024-06-09 RX ADMIN — NICOTINE POLACRILEX 2 MG: 2 GUM, CHEWING ORAL at 21:34

## 2024-06-09 RX ADMIN — CYANOCOBALAMIN TAB 1000 MCG 1000 MCG: 1000 TAB at 08:34

## 2024-06-09 RX ADMIN — METFORMIN HYDROCHLORIDE 500 MG: 500 TABLET, FILM COATED ORAL at 17:02

## 2024-06-09 RX ADMIN — NICOTINE POLACRILEX 2 MG: 2 GUM, CHEWING ORAL at 08:59

## 2024-06-09 RX ADMIN — SENNOSIDES AND DOCUSATE SODIUM 2 TABLET: 8.6; 5 TABLET ORAL at 17:02

## 2024-06-09 RX ADMIN — IBUPROFEN 600 MG: 600 TABLET, FILM COATED ORAL at 15:25

## 2024-06-09 RX ADMIN — HYDROCHLOROTHIAZIDE 12.5 MG: 12.5 TABLET ORAL at 09:08

## 2024-06-09 RX ADMIN — PROPRANOLOL HYDROCHLORIDE 10 MG: 10 TABLET ORAL at 21:27

## 2024-06-09 RX ADMIN — POLYETHYLENE GLYCOL 3350 17 G: 17 POWDER, FOR SOLUTION ORAL at 08:34

## 2024-06-09 RX ADMIN — MELATONIN TAB 3 MG 3 MG: 3 TAB at 21:27

## 2024-06-09 NOTE — NURSING NOTE
Patient is calm and cooperative. Drowsy in the AM. Visible and social. Med and meal compliant. Attended spirituality. AH present. C/o excessive saliva. Declined PRN atropine gtts stating it does not help. Continuous rounding maintained.

## 2024-06-09 NOTE — NURSING NOTE
Patient visible  all evening.  Patient cooperative with taking his scheduled HS medication. Patient offered no complaints. Behaviors controlled and appropriate.   Patient did not request or require any PRN medications this evening. Pleasant upon approach as well.

## 2024-06-09 NOTE — NURSING NOTE
Refused weekly weight. Lungs CTA. Radial and pedal pulses present. Bowel sounds present in all quadrants. Last BM 6/8. No edema observed. Verbalized c/o increased drooling. Reports PRN atropine gtts are not effective. Physician made aware.

## 2024-06-09 NOTE — NURSING NOTE
Patient is blunted, visible, and social. Asking female peer to do favors for him such as handing in his meal tray. Med and meal comp. Last documented shower 6/6. No behavioral issues.

## 2024-06-09 NOTE — NURSING NOTE
Patient c/o sudden neck pain. Rates pain 7/10. Vitals WNL. PRN motrin 600mg administered at 1525. Results pending.

## 2024-06-09 NOTE — NURSING NOTE
Patient ate meal and ambulating. Vitals rechecked  /70 . Hydrochlorothiazide and inderal administered. Norvasc remains held r/t parameters not met.

## 2024-06-09 NOTE — PROGRESS NOTES
Psychiatric Progress Note - Department of Behavioral Health   Alberto Berumen 28 y.o. male MRN: [id number]  Unit/Bed#: [facility] 101-02 Encounter: [id number]    ASSESSMENT & PLAN     Diagnoses:   Principal Problem:    Schizoaffective disorder, bipolar type (HCC)  Active Problems:    GERD (gastroesophageal reflux disease)    Medical clearance for psychiatric admission    Tobacco abuse    T wave inversion in EKG    Chronic idiopathic constipation    Confluent and reticulate papillomatosis    Primary hypertension    Elevated hemoglobin A1c    Bilateral lower extremity edema      Treatment Recommendations/Precautions:  Continue to promote patient participation in therapeutic milieu.  Continue medical management per medicine.  Continue previously prescribed psychotropic medication regimen; see below.  Continue behavioral health checks q.7 minutes.   Continue vitals per behavioral health unit protocol.  Discharge date per primary team; 201 commitment status.    SUBJECTIVE     Patient evaluated this a.m. for continuity of care. Patient was discussed at length with nursing and treatment team. Per nursing, patient means calm, cooperative, intermittently interactive in the milieu, adherent to his medications without any acute adverse effects. No acute distress is noted throughout evaluation. Alberto Berumen denies suicidal/homicidal ideation in addition to thoughts of self-injury, receptive to crisis planning provided by this writer, contacting for safety in the inpatient setting, admitting to an ability to appropriately confide in staff including this writer.  Patient remains superficial on approach, denying any/all psychiatric complaints/concerns despite previous complaints pertaining to auditory hallucinations that are negative and derogatory in content, somewhat suspicious and paranoid    PSYCHIATRIC REVIEW OF SYSTEMS     Behavior over the last 24 hours:  unchanged  Sleep: adequate  Appetite: adequate  Medication side  effects: No    REVIEW OF SYSTEMS   Review of systems: no complaints    OBJECTIVE     Vital Signs in Past 24 Hours:  Temp:  [97.6 °F (36.4 °C)-98 °F (36.7 °C)] 97.6 °F (36.4 °C)  HR:  [] 87  Resp:  [18] 18  BP: (105-142)/(58-78) 105/58    Intake/Output in Past 24 hours:  No intake/output data recorded.  No intake/output data recorded.        Laboratory Results:  I have personally reviewed all pertinent laboratory/tests results.  Most Recent Labs:   Lab Results   Component Value Date    WBC 10.09 06/03/2024    RBC 5.66 (H) 06/03/2024    HGB 13.6 06/03/2024    HCT 43.4 06/03/2024     06/03/2024    RDW 15.0 06/03/2024    NEUTROABS 5.48 06/03/2024    SODIUM 136 06/03/2024    K 3.8 06/03/2024    CL 96 06/03/2024    CO2 29 06/03/2024    BUN 10 06/03/2024    CREATININE 1.00 06/03/2024    GLUC 97 06/03/2024    GLUF 103 (H) 05/29/2024    CALCIUM 10.1 06/03/2024    AST 33 06/03/2024    ALT 72 (H) 06/03/2024    ALKPHOS 80 06/03/2024    TP 8.5 (H) 06/03/2024    ALB 4.7 06/03/2024    TBILI 0.30 06/03/2024    CHOLESTEROL 156 03/14/2024    HDL 44 03/14/2024    TRIG 133 03/14/2024    LDLCALC 85 03/14/2024    NONHDLC 112 03/14/2024    LITHIUM 0.61 01/09/2024    [id number] 1.062 11/15/2023    HGBA1C 5.0 04/01/2024    EAG 97 04/01/2024       Behavioral Health Medications: all current active meds have been reviewed, continue current psychiatric medications, and current meds:   Current Facility-Administered Medications   Medication Dose Route Frequency    acetaminophen (TYLENOL) tablet 650 mg  650 mg Oral Q4H PRN    acetaminophen (TYLENOL) tablet 650 mg  650 mg Oral Q6H PRN    aluminum-magnesium hydroxide-simethicone (MAALOX) oral suspension 30 mL  30 mL Oral Q4H PRN    amLODIPine (NORVASC) tablet 5 mg  5 mg Oral Daily    ARIPiprazole (ABILIFY) tablet 5 mg  5 mg Oral Daily    Artificial Tears ophthalmic solution 1 drop  1 drop Both Eyes Q3H PRN    atropine (ISOPTO ATROPINE) 1 % ophthalmic solution 1 drop  1 drop Sublingual  HS    atropine (ISOPTO ATROPINE) 1 % ophthalmic solution 1 drop  1 drop Sublingual Daily PRN    haloperidol lactate (HALDOL) injection 2.5 mg  2.5 mg Intramuscular Q4H PRN Max 4/day    And    LORazepam (ATIVAN) injection 1 mg  1 mg Intramuscular Q4H PRN Max 4/day    And    benztropine (COGENTIN) injection 0.5 mg  0.5 mg Intramuscular Q4H PRN Max 4/day    benztropine (COGENTIN) injection 1 mg  1 mg Intramuscular Q4H PRN Max 6/day    haloperidol lactate (HALDOL) injection 5 mg  5 mg Intramuscular Q4H PRN Max 4/day    And    LORazepam (ATIVAN) injection 2 mg  2 mg Intramuscular Q4H PRN Max 4/day    And    benztropine (COGENTIN) injection 1 mg  1 mg Intramuscular Q4H PRN Max 4/day    benztropine (COGENTIN) tablet 1 mg  1 mg Oral Q4H PRN Max 6/day    benztropine (COGENTIN) tablet 1 mg  1 mg Oral Q4H PRN Max 6/day    bisacodyl (DULCOLAX) rectal suppository 10 mg  10 mg Rectal Daily PRN    cloZAPine (CLOZARIL) tablet 500 mg  500 mg Oral HS    cyanocobalamin (VITAMIN B-12) tablet 1,000 mcg  1,000 mcg Oral Daily    hydrOXYzine HCL (ATARAX) tablet 50 mg  50 mg Oral Q6H PRN Max 4/day    Or    diphenhydrAMINE (BENADRYL) injection 50 mg  50 mg Intramuscular Q6H PRN    hydrOXYzine HCL (ATARAX) tablet 50 mg  50 mg Oral Q6H PRN Max 4/day    Or    diphenhydrAMINE (BENADRYL) injection 50 mg  50 mg Intramuscular Q6H PRN    diphenhydrAMINE-zinc acetate (BENADRYL) 2-0.1 % cream   Topical BID PRN    escitalopram (LEXAPRO) tablet 20 mg  20 mg Oral Daily    fluticasone (FLONASE) 50 mcg/act nasal spray 1 spray  1 spray Each Nare Daily    haloperidol (HALDOL) tablet 1 mg  1 mg Oral Q6H PRN    haloperidol (HALDOL) tablet 2.5 mg  2.5 mg Oral Q4H PRN Max 4/day    haloperidol (HALDOL) tablet 5 mg  5 mg Oral Q4H PRN Max 4/day    hydroCHLOROthiazide tablet 12.5 mg  12.5 mg Oral Daily    hydrocortisone 1 % ointment   Topical 4x Daily PRN    hydrOXYzine HCL (ATARAX) tablet 100 mg  100 mg Oral Q6H PRN Max 4/day    Or    LORazepam (ATIVAN) injection 2  mg  2 mg Intramuscular Q6H PRN    hydrOXYzine HCL (ATARAX) tablet 100 mg  100 mg Oral Q6H PRN Max 4/day    Or    LORazepam (ATIVAN) injection 2 mg  2 mg Intramuscular Q6H PRN    hydrOXYzine HCL (ATARAX) tablet 25 mg  25 mg Oral Q6H PRN Max 4/day    ibuprofen (MOTRIN) tablet 600 mg  600 mg Oral Q8H PRN    loratadine (CLARITIN) tablet 10 mg  10 mg Oral Daily    melatonin tablet 3 mg  3 mg Oral HS    metFORMIN (GLUCOPHAGE) tablet 500 mg  500 mg Oral BID With Meals    methocarbamol (ROBAXIN) tablet 500 mg  500 mg Oral Q6H PRN    nicotine polacrilex (NICORETTE) gum 2 mg  2 mg Oral Q4H PRN    OLANZapine (ZyPREXA) tablet 5 mg  5 mg Oral Q4H PRN Max 3/day    Or    OLANZapine (ZyPREXA) IM injection 2.5 mg  2.5 mg Intramuscular Q4H PRN Max 3/day    OLANZapine (ZyPREXA) tablet 5 mg  5 mg Oral Q3H PRN Max 3/day    Or    OLANZapine (ZyPREXA) IM injection 5 mg  5 mg Intramuscular Q3H PRN Max 3/day    OLANZapine (ZyPREXA) tablet 2.5 mg  2.5 mg Oral Q4H PRN Max 6/day    ondansetron (ZOFRAN-ODT) dispersible tablet 4 mg  4 mg Oral Q6H PRN    polyethylene glycol (MIRALAX) packet 17 g  17 g Oral Daily    polyethylene glycol (MIRALAX) packet 17 g  17 g Oral Daily PRN    propranolol (INDERAL) tablet 10 mg  10 mg Oral Q12H KAYLIE    senna-docusate sodium (SENOKOT S) 8.6-50 mg per tablet 1 tablet  1 tablet Oral Daily PRN    senna-docusate sodium (SENOKOT S) 8.6-50 mg per tablet 2 tablet  2 tablet Oral BID    traZODone (DESYREL) tablet 50 mg  50 mg Oral HS PRN    white petrolatum-mineral oil (EUCERIN,HYDROCERIN) cream   Topical TID PRN     Risks, benefits and possible side effects of Medications:   Risks, benefits, and possible side effects of medications explained to patient and patient verbalizes understanding.      Mental Status Evaluation:  Appearance:  age appropriate, casually dressed, disheveled, and marginal grooming/hygiene   Behavior:  psychomotor retardation, calm, cooperative, superficial, somewhat suspicious   Speech:  soft and  scant   Mood:  euthymic   Affect:  blunted and mood-incongruent   Language sparse   Thought Process:  goal directed   Thought Content:  delusions  persecutory and paranoid   Perceptual Disturbances: None although appears internally preoccupied and distracted   Risk Potential: Suicidal Ideations none, Homicidal Ideations none, and Potential for Aggression No   Sensorium:  person, place, and time/date   Cognition:  recent and remote memory grossly intact   Consciousness:  alert and awake    Attention: attention span appeared shorter than expected for age   Insight:  limited   Judgment: limited   Intellect Not assessed   Gait/Station: Normal gait and station   Motor Activity: no abnormal movements     Memory: Short and long term memory fair     Progress Toward Goals: Unchanged, as evidenced by their participation (or lack thereof) in individual, social and therapeutic milieu in addition to adherence to their medication regimen.    Recommended Treatment:   See above for assessment and plan.    Inpatient Psychiatric Certification: Based upon physical, mental and social evaluations, I certify that inpatient psychiatric services are medically necessary for this patient for a duration of greater than 2 midnights for the treatment of Schizoaffective disorder, bipolar type (HCC) including psychotropic medication management, participation in the therapeutic milieu and referrals as indicated. Available alternative community resources do not meet the patient's mental health care needs. I further attest that an established written individualized plan of care has been implemented and is outlined in the patient's medical records.    Counseling/Coordination of Care    Total unit time spent today was greater than 15 minutes. Greater than 50% of total time was spent with the patient and/or patient's relatives and/or coordination of patient's care.     Patient's rights, confidentiality, exceptions to confidentiality, use of electronic  medical record including appropriate staff access to medical record regarding behavioral health services and consent to treatment were reviewed.    Note Share:     This note was not shared with the patient due to reasonable likelihood of causing patient harm     This note has been constructed using a voice recognition system.    There may be translation, syntax, or grammatical errors. If you have any questions, please contact the dictating provider.    Jordan Christopher Holter, DO 06/09/24

## 2024-06-09 NOTE — PLAN OF CARE
Problem: Alteration in Thoughts and Perception  Goal: Verbalize thoughts and feelings  Description: Interventions:  - Promote a nonjudgmental and trusting relationship with the patient through active listening and therapeutic communication  - Assess patient's level of functioning, behavior and potential for risk  - Engage patient in 1 on 1 interactions  - Encourage patient to express fears, feelings, frustrations, and discuss symptoms    - [place] patient to reality, help patient recognize reality-based thinking   - Administer medications as ordered and assess for potential side effects  - Provide the patient education related to the signs and symptoms of the illness and desired effects of prescribed medications  Outcome: Progressing  Goal: Agree to be compliant with medication regime, as prescribed and report medication side effects  Description: Interventions:  - Offer appropriate PRN medication and supervise ingestion; conduct AIMS, as needed   Outcome: Progressing  Goal: Attend and participate in unit activities, including therapeutic, recreational, and educational groups  Description: Interventions:  -Encourage Visitation and family involvement in care  Outcome: Progressing  Goal: Recognize dysfunctional thoughts, communicate reality-based thoughts at the time of discharge  Description: Interventions:  - Provide medication and psycho-education to assist patient in compliance and developing insight into his/her illness   Outcome: Progressing  Goal: Complete daily ADLs, including personal hygiene independently, as able  Description: Interventions:  - Observe, teach, and assist patient with ADLS  - Monitor and promote a balance of rest/activity, with adequate nutrition and elimination   Outcome: Progressing     Problem: Ineffective Coping  Goal: Identifies healthy coping skills  Outcome: Progressing  Goal: Demonstrates healthy coping skills  Outcome: Progressing  Goal: Participates in unit  activities  Description: Interventions:  - Provide therapeutic environment   - Provide required programming   - Redirect inappropriate behaviors   Outcome: Progressing     Problem: Depression  Goal: Refrain from harming self  Description: Interventions:  - Monitor patient closely, per order   - Supervise medication ingestion, monitor effects and side effects   Outcome: Progressing  Goal: Refrain from isolation  Description: Interventions:  - Develop a trusting relationship   - Encourage socialization   Outcome: Progressing  Goal: Refrain from self-neglect  Outcome: Progressing     Problem: Alteration in Thoughts and Perception  Goal: Treatment Goal: Gain control of psychotic behaviors/thinking, reduce/eliminate presenting symptoms and demonstrate improved reality functioning upon discharge  Outcome: Not Progressing  Goal: Refrain from acting on delusional thinking/internal stimuli  Description: Interventions:  - Monitor patient closely, per order   - Utilize least restrictive measures   - Set reasonable limits, give positive feedback for acceptable   - Administer medications as ordered and monitor of potential side effects  Outcome: Not Progressing

## 2024-06-10 PROCEDURE — 99232 SBSQ HOSP IP/OBS MODERATE 35: CPT | Performed by: PSYCHIATRY & NEUROLOGY

## 2024-06-10 RX ORDER — GLYCOPYRROLATE 1 MG/1
1 TABLET ORAL 3 TIMES DAILY
Status: DISCONTINUED | OUTPATIENT
Start: 2024-06-10 | End: 2024-08-23

## 2024-06-10 RX ORDER — ARIPIPRAZOLE 5 MG/1
10 TABLET ORAL DAILY
Status: DISCONTINUED | OUTPATIENT
Start: 2024-06-11 | End: 2024-06-18

## 2024-06-10 RX ADMIN — SENNOSIDES AND DOCUSATE SODIUM 2 TABLET: 8.6; 5 TABLET ORAL at 08:39

## 2024-06-10 RX ADMIN — POLYETHYLENE GLYCOL 3350 17 G: 17 POWDER, FOR SOLUTION ORAL at 08:38

## 2024-06-10 RX ADMIN — LORATADINE 10 MG: 10 TABLET ORAL at 08:40

## 2024-06-10 RX ADMIN — NICOTINE POLACRILEX 2 MG: 2 GUM, CHEWING ORAL at 13:04

## 2024-06-10 RX ADMIN — GLYCOPYRROLATE 1 MG: 1 TABLET ORAL at 21:11

## 2024-06-10 RX ADMIN — NICOTINE POLACRILEX 2 MG: 2 GUM, CHEWING ORAL at 08:45

## 2024-06-10 RX ADMIN — FLUTICASONE PROPIONATE 1 SPRAY: 50 SPRAY, METERED NASAL at 08:43

## 2024-06-10 RX ADMIN — GLYCOPYRROLATE 1 MG: 1 TABLET ORAL at 09:06

## 2024-06-10 RX ADMIN — METFORMIN HYDROCHLORIDE 500 MG: 500 TABLET, FILM COATED ORAL at 17:10

## 2024-06-10 RX ADMIN — METFORMIN HYDROCHLORIDE 500 MG: 500 TABLET, FILM COATED ORAL at 08:39

## 2024-06-10 RX ADMIN — CLOZAPINE 500 MG: 100 TABLET ORAL at 21:11

## 2024-06-10 RX ADMIN — MELATONIN TAB 3 MG 3 MG: 3 TAB at 21:11

## 2024-06-10 RX ADMIN — ARIPIPRAZOLE 5 MG: 5 TABLET ORAL at 08:39

## 2024-06-10 RX ADMIN — CYANOCOBALAMIN TAB 1000 MCG 1000 MCG: 1000 TAB at 08:40

## 2024-06-10 RX ADMIN — NICOTINE POLACRILEX 2 MG: 2 GUM, CHEWING ORAL at 21:11

## 2024-06-10 RX ADMIN — PROPRANOLOL HYDROCHLORIDE 10 MG: 10 TABLET ORAL at 08:39

## 2024-06-10 RX ADMIN — PROPRANOLOL HYDROCHLORIDE 10 MG: 10 TABLET ORAL at 21:11

## 2024-06-10 RX ADMIN — ESCITALOPRAM OXALATE 20 MG: 10 TABLET, FILM COATED ORAL at 08:40

## 2024-06-10 RX ADMIN — NICOTINE POLACRILEX 2 MG: 2 GUM, CHEWING ORAL at 17:10

## 2024-06-10 RX ADMIN — HYDROCHLOROTHIAZIDE 12.5 MG: 12.5 TABLET ORAL at 08:40

## 2024-06-10 RX ADMIN — SENNOSIDES AND DOCUSATE SODIUM 2 TABLET: 8.6; 5 TABLET ORAL at 17:10

## 2024-06-10 RX ADMIN — GLYCOPYRROLATE 1 MG: 1 TABLET ORAL at 17:10

## 2024-06-10 NOTE — PLAN OF CARE
Problem: Alteration in Thoughts and Perception  Goal: Verbalize thoughts and feelings  Description: Interventions:  - Promote a nonjudgmental and trusting relationship with the patient through active listening and therapeutic communication  - Assess patient's level of functioning, behavior and potential for risk  - Engage patient in 1 on 1 interactions  - Encourage patient to express fears, feelings, frustrations, and discuss symptoms    - [place] patient to reality, help patient recognize reality-based thinking   - Administer medications as ordered and assess for potential side effects  - Provide the patient education related to the signs and symptoms of the illness and desired effects of prescribed medications  Outcome: Not Progressing  Goal: Refrain from acting on delusional thinking/internal stimuli  Description: Interventions:  - Monitor patient closely, per order   - Utilize least restrictive measures   - Set reasonable limits, give positive feedback for acceptable   - Administer medications as ordered and monitor of potential side effects  Outcome: Progressing  Goal: Agree to be compliant with medication regime, as prescribed and report medication side effects  Description: Interventions:  - Offer appropriate PRN medication and supervise ingestion; conduct AIMS, as needed   Outcome: Progressing  Goal: Attend and participate in unit activities, including therapeutic, recreational, and educational groups  Description: Interventions:  -Encourage Visitation and family involvement in care  Outcome: Progressing  Goal: Recognize dysfunctional thoughts, communicate reality-based thoughts at the time of discharge  Description: Interventions:  - Provide medication and psycho-education to assist patient in compliance and developing insight into his/her illness   Outcome: Not Progressing  Goal: Complete daily ADLs, including personal hygiene independently, as able  Description: Interventions:  - Observe, teach, and  assist patient with ADLS  - Monitor and promote a balance of rest/activity, with adequate nutrition and elimination   Outcome: Not Progressing     Problem: Ineffective Coping  Goal: Identifies ineffective coping skills  Outcome: Not Progressing  Goal: Identifies healthy coping skills  Outcome: Progressing  Goal: Demonstrates healthy coping skills  Outcome: Not Progressing  Goal: Participates in unit activities  Description: Interventions:  - Provide therapeutic environment   - Provide required programming   - Redirect inappropriate behaviors   Outcome: Progressing  Goal: Patient/Family participate in treatment and DC plans  Description: Interventions:  - Provide therapeutic environment  Outcome: Not Progressing  Goal: Patient/Family verbalizes awareness of resources  Outcome: Not Progressing     Problem: Depression  Goal: Verbalize thoughts and feelings  Description: Interventions:  - Assess and re-assess patient's level of risk   - Engage patient in 1:1 interactions, daily, for a minimum of 15 minutes   - Encourage patient to express feelings, fears, frustrations, hopes   Outcome: Not Progressing  Goal: Refrain from harming self  Description: Interventions:  - Monitor patient closely, per order   - Supervise medication ingestion, monitor effects and side effects   Outcome: Progressing  Goal: Refrain from isolation  Description: Interventions:  - Develop a trusting relationship   - Encourage socialization   Outcome: Progressing  Goal: Attend and participate in unit activities, including therapeutic, recreational, and educational groups  Description: Interventions:  - Provide therapeutic and educational activities daily, encourage attendance and participation, and document same in the medical record   Outcome: Progressing  Goal: Complete daily ADLs, including personal hygiene independently, as able  Description: Interventions:  - Observe, teach, and assist patient with ADLS  -  Monitor and promote a balance of  rest/activity, with adequate nutrition and elimination   Outcome: Not Progressing     Problem: Anxiety  Goal: Anxiety is at manageable level  Description: Interventions:  - Assess and monitor patient's anxiety level.   - Monitor for signs and symptoms (heart palpitations, chest pain, shortness of breath, headaches, nausea, feeling jumpy, restlessness, irritable, apprehensive).   - Collaborate with interdisciplinary team and initiate plan and interventions as ordered.  - Mov[place] patient to unit/surroundings  - Explain treatment plan  - Encourage participation in care  - Encourage verbalization of concerns/fears  - Identify coping mechanisms  - Assist in developing anxiety-reducing skills  - Administer/offer alternative therapies  - Limit or eliminate stimulants  Outcome: Not Progressing     Problem: Alteration in Orientation  Goal: Interact with staff daily  Description: Interventions:  - Assess and re-assess patient's level of orientation  - Engage patient in 1 on 1 interactions, daily, for a minimum of 15 minutes   - Establish rapport/trust with patient   Outcome: Not Progressing  Goal: Express concerns related to confused thinking related to:  Description: Interventions:  - Encourage patient to express feelings, fears, frustrations, hopes  - Assign consistent caregivers   - Moville/re-orient patient as needed  - Allow comfort items, as appropriate  - Provide visual cues, signs, etc.   Outcome: Not Progressing  Goal: Allow medical examinations, as recommended  Description: Interventions:  - Provide physical/neurological exams and/or referrals, per provider   Outcome: Progressing  Goal: Cooperate with recommended testing/procedures  Description: Interventions:  - Determine need for ancillary testing  - Observe for mental status changes  - Implement falls/precaution protocol   Outcome: Progressing  Goal: Attend and participate in unit activities, including therapeutic, recreational, and educational groups  Description:  Interventions:  - Provide therapeutic and educational activities daily, encourage attendance and participation, and document same in the medical record   - Provide appropriate opportunities for reminiscence   - Provide a consistent daily routine   - Encourage family contact/visitation   Outcome: Not Progressing  Goal: Complete daily ADLs, including personal hygiene independently, as able  Description: Interventions:  - Observe, teach, and assist patient with ADLS  Outcome: Not Progressing     Problem: Electroconvulsive therapy (ECT)  Goal: Verbalizes/displays adequate comfort level or baseline comfort level  Description: Interventions:  - Encourage patient to monitor pain and request assistance  - Assess pain using appropriate pain scale  - Administer analgesics based on type and severity of pain and evaluate response  - Implement non-pharmacological measures as appropriate and evaluate response  - Consider cultural and social influences on pain and pain management  - Notify physician/advanced practitioner if interventions unsuccessful or patient reports new pain  Outcome: Not Progressing  Goal: Achieves stable or improved neurological status  Description: INTERVENTIONS  - Monitor and report changes in neurological status  - Monitor vital signs such as temperature, blood pressure, glucose, and any other labs ordered   - Initiate measures to prevent increased intracranial pressure  - Monitor for seizure activity and implement precautions if appropriate      Outcome: Not Progressing  Goal: Achieves maximal functionality and self care  Description: INTERVENTIONS  - Monitor swallowing and airway patency with patient fatigue and changes in neurological status  - Encourage and assist patient to increase activity and self care.   - Encourage visually impaired, hearing impaired and aphasic patients to use assistive/communication devices  Outcome: Not Progressing  Goal: Maintain or return mobility to safest level of  function  Description: INTERVENTIONS:  - Assess patient's ability to carry out ADLs; assess patient's baseline for ADL function and identify physical deficits which impact ability to perform ADLs (bathing, care of mouth/teeth, toileting, grooming, dressing, etc.)  - Assess/evaluate cause of self-care deficits   - Assess range of motion  - Assess patient's mobility  - Assess patient's need for assistive devices and provide as appropriate  - Encourage maximum independence but intervene and supervise when necessary  - Involve family in performance of ADLs  - Assess for home care needs following discharge   - Consider OT consult to assist with ADL evaluation and planning for discharge  - Provide patient education as appropriate  Outcome: Not Progressing  Goal: Absence of urinary retention  Description: INTERVENTIONS:  - Assess patient’s ability to void and empty bladder  - Monitor I/O  - Bladder scan as needed  - Discuss with physician/AP medications to alleviate retention as needed  - Discuss catheterization for long term situations as appropriate  Outcome: Progressing  Goal: Minimal or absence of nausea and/or vomiting  Description: INTERVENTIONS:  - Administer IV fluids if ordered to ensure adequate hydration  - Maintain NPO status until nausea and vomiting are resolved  - Nasogastric tube if ordered  - Administer ordered antiemetic medications as needed  - Provide nonpharmacologic comfort measures as appropriate  - Advance diet as tolerated, if ordered  - Consider nutrition services referral to assist patient with adequate nutrition and appropriate food choices  Outcome: Progressing  Goal: Maintains adequate nutritional intake  Description: INTERVENTIONS:  - Monitor percentage of each meal consumed  - Identify factors contributing to decreased intake, treat as appropriate  - Assist with meals as needed  - Monitor I&O, weight, and lab values if indicated  - Obtain nutrition services referral as needed  Outcome:  Progressing     Problem: DISCHARGE PLANNING - CARE MANAGEMENT  Goal: Discharge to post-acute care or home with appropriate resources  Description: INTERVENTIONS:  - Conduct assessment to determine patient/family and health care team treatment goals, and need for post-acute services based on payer coverage, community resources, and patient preferences, and barriers to discharge  - Address psychosocial, clinical, and financial barriers to discharge as identified in assessment in conjunction with the patient/family and health care team  - Arrange appropriate level of post-acute services according to patient’s   needs and preference and payer coverage in collaboration with the physician and health care team  - Communicate with and update the patient/family, physician, and health care team regarding progress on the discharge plan  - Arrange appropriate transportation to post-acute venues  Outcome: Not Progressing

## 2024-06-10 NOTE — NURSING NOTE
Patient is blunted, calm, and cooperative. Visible and social w/ select peers. Med and meal compliant. Excessive drooling observed. New order for routine robinul 1mg TID. Attending most groups. AH present. Continuous rounding maintained.

## 2024-06-10 NOTE — PROGRESS NOTES
Progress Note - Behavioral Health   Alberto Berumen 28 y.o. male MRN: [id number]  Unit/Bed#: [facility] 101-02 Encounter: [id number]    Assessment & Plan   Principal Problem:    Schizoaffective disorder, bipolar type ([facility])  Active Problems:    GERD (gastroesophageal reflux disease)    Medical clearance for psychiatric admission    Tobacco abuse    T wave inversion in EKG    Chronic idiopathic constipation    Confluent and reticulate papillomatosis    Primary hypertension    Elevated hemoglobin A1c    Bilateral lower extremity edema      Behavior over the last 24 hours:  unchanged  Sleep: normal  Appetite: normal  Medication side effects: Yes The patient experiences drooling and was put on atropine drops, but his symptoms have not improved.   ROS: no complaints, the patient notes occasional abdominal pain and headaches, however they are not present at this time.     Subjective: The patient is a 28 year old male with schizoaffective disorder bipolar type. The patient reports continued auditory hallucinations of someone stating that they are going to kill him and his family. He states that these voices fluctuate in severity, but today are about the same as usual. He experiences depression that he rates 6.5/10. He states that he is especially tired today and that over the weekend he was feeling stressed and anxious. He reports experiencing thoughts of death and suicide in the past, without plans, but denies any suicidal thoughts at this time. He denies any homicidal thoughts.   Nursing reports that over the weekend the patient was visible and social without any behavioral concerns. The patient reported to nursing that he was anxious and depressed over the weekend, and that he continued to experience his same auditory hallucinations. The patient attended 3/9 groups.     Mental Status Evaluation:  Appearance:  disheveled, older than stated age, and overweight. Dressed in hospital clothing. Appropriate eye contact.   Behavior:   Pleasant, cooperative, slow in responses and movements. Sleepy.    Speech:  normal pitch and normal volume   Mood:  Tired   Affect:  constricted, flat, mood-congruent, and stable.    Thought Process:  normal and logical   Associations: intact associations   Thought Content:  delusions  persecutory. Paranoid that someone is out to get him and his family. Denies grandiose and bizarre delusions. Denies obsessions, compulsions, or phobias.    Perceptual Disturbances: Auditory hallucinations without commands. Voices are threatening to kill him and his family. Denies any other hallucinations or illusions. Denies personalization and realization.    Risk Potential: Suicidal Ideations none  Homicidal Ideations none  Potential for Aggression No   Sensorium:  person, place, situation, and month of year. Does not know the date today   Memory:  recent memory mildly impaired, does not remember what he watched on tv this weekend. Is able to recall 1/3 words after 5 minutes. Is able to recognized 1 word with multiple choice options and is not able to remember the final word., remote memory intact, immediate memory intact.   Consciousness:  alert and awake    Attention and concentration: attention span and concentration were age appropriate   Intelligence: The patient is able to identify that there are [place] in the [place]. Unable to answer how many continents there are in the world, what continent we are on, or what countries border the [place]. Able to answer calculations questions such as 3x8, 4x8 and 5x8. Abstract interpretation, problem solving, and vocabulary age appropriate.    Impulse control: No sign of poor impulse control   Insight:  age appropriate. Acknowledges the need for treatment. Believes his medications help him sometimes.   Judgment: age appropriate and good   Gait/Station: slow   Motor Activity: no abnormal movements     Progress Toward Goals: The patient continues to experience auditory hallucinations and paranoid  delusions. He rates his depression at a 6.5/10 today. The patient believes that his ECTs are helping him and that his other medications help him sometimes. The patient's next ECT session is June 21.     Recommended Treatment: Continue with group therapy, milieu therapy and occupational therapy.      Risks, benefits and possible side effects of Medications:   Risks, benefits, and possible side effects of medications explained to patient and patient verbalizes understanding.      Medications: all current active meds have been reviewed. Discontinued atropine drops and begin glycopyrrolate (Robinul) tablets to treat medication side effect of drooling.       Counseling / Coordination of Care  Total floor / unit time spent today 20 minutes. Greater than 50% of total time was spent with the patient and / or family counseling and / or coordination of care.

## 2024-06-10 NOTE — NURSING NOTE
Remains blunted, visible, and social. Reported brief episode of nausea earlier today. Declined PRN and then later subsided. Currently sitting in dining room playing cards. No behavioral issues.

## 2024-06-10 NOTE — PROGRESS NOTES
06/10/24 0735   Team Meeting   Meeting Type Daily Rounds   Team Members Present   Team Members Present Physician;Nurse;;Other (Discipline and Name)   Patient/Family Present   Patient Present No   Patient's Family Present No     In attendance:  Dr. Alex Thomas, MD Dr. Jordan Holter, DO Eveline Hunt, HOLLI Taylor, RN  Luisana Alvarado, [facility]  Carla Lux, W  Irais Mcdowell M.S.    Groups: 3/9    Pt reports ongoing auditory hallucinations. Pt reports ongoing anxiety and depression. Pt is visible and social with select peers. No bx issues noted. Pt's next scheduled ECT is 6/21.

## 2024-06-10 NOTE — PROGRESS NOTES
06/10/24 1039   Team Meeting   Meeting Type Tx Team Meeting   Initial Conference Date 06/10/24   Next Conference Date 06/24/24   Team Members Present   Team Members Present Physician;Nurse;;Other (Discipline and Name)   Physician Team Member Abraham   Nursing Team Member Claudia   Social Work Team Member Jenny BROOKS   Other (Discipline and Name) Geronimo - [facility]; Sharlene - [facility]   Patient/Family Present   Patient Present Yes   Patient's Family Present No   OTHER   Team Meeting - Additional Comments Pt attended his tx team meeting. Pt read through self-assessment. Pt continues to report ongoing AH, depression, anxiety, and suicidality without a plan. Pt was flat, blunted and depressive.

## 2024-06-10 NOTE — PLAN OF CARE
Problem: Ineffective Coping  Goal: Identifies ineffective coping skills  Outcome: Progressing  Goal: Identifies healthy coping skills  Outcome: Progressing  Goal: Demonstrates healthy coping skills  Outcome: Progressing  Goal: Participates in unit activities  Description: Interventions:  - Provide therapeutic environment   - Provide required programming   - Redirect inappropriate behaviors   Outcome: Progressing   Attended 27/44 groups offered last week.

## 2024-06-11 PROCEDURE — 99232 SBSQ HOSP IP/OBS MODERATE 35: CPT | Performed by: PSYCHIATRY & NEUROLOGY

## 2024-06-11 RX ADMIN — GLYCOPYRROLATE 1 MG: 1 TABLET ORAL at 08:42

## 2024-06-11 RX ADMIN — CLOZAPINE 500 MG: 100 TABLET ORAL at 21:10

## 2024-06-11 RX ADMIN — GLYCOPYRROLATE 1 MG: 1 TABLET ORAL at 21:10

## 2024-06-11 RX ADMIN — SENNOSIDES AND DOCUSATE SODIUM 2 TABLET: 8.6; 5 TABLET ORAL at 08:42

## 2024-06-11 RX ADMIN — LORATADINE 10 MG: 10 TABLET ORAL at 08:42

## 2024-06-11 RX ADMIN — METFORMIN HYDROCHLORIDE 500 MG: 500 TABLET, FILM COATED ORAL at 17:15

## 2024-06-11 RX ADMIN — POLYETHYLENE GLYCOL 3350 17 G: 17 POWDER, FOR SOLUTION ORAL at 08:43

## 2024-06-11 RX ADMIN — NICOTINE POLACRILEX 2 MG: 2 GUM, CHEWING ORAL at 14:41

## 2024-06-11 RX ADMIN — MELATONIN TAB 3 MG 3 MG: 3 TAB at 21:10

## 2024-06-11 RX ADMIN — PROPRANOLOL HYDROCHLORIDE 10 MG: 10 TABLET ORAL at 08:42

## 2024-06-11 RX ADMIN — SENNOSIDES AND DOCUSATE SODIUM 2 TABLET: 8.6; 5 TABLET ORAL at 17:15

## 2024-06-11 RX ADMIN — METFORMIN HYDROCHLORIDE 500 MG: 500 TABLET, FILM COATED ORAL at 08:42

## 2024-06-11 RX ADMIN — NICOTINE POLACRILEX 2 MG: 2 GUM, CHEWING ORAL at 08:41

## 2024-06-11 RX ADMIN — NICOTINE POLACRILEX 2 MG: 2 GUM, CHEWING ORAL at 21:10

## 2024-06-11 RX ADMIN — ARIPIPRAZOLE 10 MG: 5 TABLET ORAL at 08:42

## 2024-06-11 RX ADMIN — GLYCOPYRROLATE 1 MG: 1 TABLET ORAL at 17:15

## 2024-06-11 RX ADMIN — FLUTICASONE PROPIONATE 1 SPRAY: 50 SPRAY, METERED NASAL at 08:43

## 2024-06-11 RX ADMIN — CYANOCOBALAMIN TAB 1000 MCG 1000 MCG: 1000 TAB at 08:42

## 2024-06-11 RX ADMIN — PROPRANOLOL HYDROCHLORIDE 10 MG: 10 TABLET ORAL at 21:10

## 2024-06-11 RX ADMIN — ESCITALOPRAM OXALATE 20 MG: 10 TABLET, FILM COATED ORAL at 08:42

## 2024-06-11 RX ADMIN — HYDROCHLOROTHIAZIDE 12.5 MG: 12.5 TABLET ORAL at 08:42

## 2024-06-11 RX ADMIN — AMLODIPINE BESYLATE 5 MG: 5 TABLET ORAL at 08:42

## 2024-06-11 NOTE — NURSING NOTE
Alberto maintain on ongoing SAFE precaution without incident on this shift. Observed resting in bed, respiration even and unlabored. Continuous Q 7 minutes check implemented thru the night. No behavioral noted.

## 2024-06-11 NOTE — PROGRESS NOTES
Progress Note - Behavioral Health   Alberto Berumen 28 y.o. male MRN: [id number]  Unit/Bed#: [facility] 101-02 Encounter: [id number]    Assessment & Plan   Principal Problem:    Schizoaffective disorder, bipolar type ([facility])  Active Problems:    GERD (gastroesophageal reflux disease)    Medical clearance for psychiatric admission    Tobacco abuse    T wave inversion in EKG    Chronic idiopathic constipation    Confluent and reticulate papillomatosis    Primary hypertension    Elevated hemoglobin A1c    Bilateral lower extremity edema      Behavior over the last 24 hours:  unchanged  Sleep: normal  Appetite: normal  Medication side effects: Yes the patient has been experiencing drooling. He was started on  glycopyrrolate (Robinul) yesterday but it has not yet been effective.   ROS: nausea, the patient reported occasional nausea yesterday, which has since improved. He did not take any medications for this symptom.     Subjective: The patient is a 28 year old male with schizoaffective disorder bipolar type. The patient reports yesterday having a bad day, with more severe auditory hallucinations. He states that the voices were louder and with worse content than usual, but would not elaborate on exactly what the voices were saying. He states that the voices threaten to kill him and his family. Today, the voices have slightly improved. He additionally has been experiencing paranoid delusions that someone is going to hurt him and his family. The patient states that being outside, walking and thinking of his family sometimes helps his symptoms. Today he continues to feel stressed and rates his depression today to be a 6-7/10 in severity. He states that he did experience suicidal thoughts yesterday, without plans. He no longer has those thoughts today. He denies homicidal ideation.   Nursing reports that the patient did not appear to be acting like himself yesterday, as he was more quiet. However, he continued to be visible and  social with his peers. The patient reported nausea to the nurses, but did not want to take any medication for it. He still ate 100% at all meals. He attended 8/9 groups.     Mental Status Evaluation:  Appearance:  disheveled, older than stated age, and overweight. Casually dressed in hospital clothes. Appropriate eye contact.    Behavior:  Cooperative, pleasant, slow in movements.   Speech:  normal pitch and normal volume   Mood:  Stressed   Affect:  flat and mood-congruent. Stable.   Thought Process:  normal and logical   Associations: intact associations   Thought Content:  delusions  persecutory, paranoid delusions that someone is planning on harming him and his family.    Perceptual Disturbances: Auditory hallucinations without commands. The patient experiences voices that are threatening to kill him and his family.    Risk Potential: Suicidal Ideations none  Homicidal Ideations none  Potential for Aggression No   Sensorium:  person, place, time/date, and situation   Memory:  Immediate, recent, and remote memory intact.    Consciousness:  alert and awake    Attention and Concentration: Attention mildly impaired, able to repeat the majority of a phone number, unable to remember the last 3 digits. Concentration intact.    Intelligence: Age appropriate   Impulse control: Does not exhibit signs of poor impulse control   Insight:  age appropriate and good   Judgment: age appropriate and good   Gait/Station: slow   Motor Activity: no abnormal movements     Progress Toward Goals: The patient experienced more severe symptoms yesterday, with worsening auditory hallucinations. Today, he states that the voices are slightly improved. He continues to experience persecutory delusions, as well. He rates his depression at a 6-7/10 in severity. He believes that his medications and ECTs are helping him. His next ECT is scheduled for June 21.     Recommended Treatment: Continue with group therapy, milieu therapy and occupational  therapy.      Risks, benefits and possible side effects of Medications:   Risks, benefits, and possible side effects of medications explained to patient and patient verbalizes understanding.      Medications: all current active meds have been reviewed.      Counseling / Coordination of Care  Total floor / unit time spent today 20 minutes. Greater than 50% of total time was spent with the patient and / or family counseling and / or coordination of care.

## 2024-06-11 NOTE — PROGRESS NOTES
06/11/24 0739   Team Meeting   Meeting Type Daily Rounds   Team Members Present   Team Members Present Physician;Nurse;;Other (Discipline and Name)   Patient/Family Present   Patient Present No   Patient's Family Present No     In attendance:  Dr. Alex Thomas, MD Dr. Jordan Holter, HOLLI Weiss, RN  Luisana Alvarado, [facility]  Carla Lux, [facility]    Groups: 8/9    Pt Atropine discontinued; Rubinul 1mg added 3x/day. Abilify increased to 10mg daily. Pt reports ongoing depression, anxiety and auditory hallucinations. Pt has been more quiet and reserved lately.

## 2024-06-11 NOTE — PLAN OF CARE
Problem: Alteration in Thoughts and Perception  Goal: Verbalize thoughts and feelings  Description: Interventions:  - Promote a nonjudgmental and trusting relationship with the patient through active listening and therapeutic communication  - Assess patient's level of functioning, behavior and potential for risk  - Engage patient in 1 on 1 interactions  - Encourage patient to express fears, feelings, frustrations, and discuss symptoms    - [place] patient to reality, help patient recognize reality-based thinking   - Administer medications as ordered and assess for potential side effects  - Provide the patient education related to the signs and symptoms of the illness and desired effects of prescribed medications  Outcome: Progressing  Goal: Refrain from acting on delusional thinking/internal stimuli  Description: Interventions:  - Monitor patient closely, per order   - Utilize least restrictive measures   - Set reasonable limits, give positive feedback for acceptable   - Administer medications as ordered and monitor of potential side effects  Outcome: Progressing  Goal: Agree to be compliant with medication regime, as prescribed and report medication side effects  Description: Interventions:  - Offer appropriate PRN medication and supervise ingestion; conduct AIMS, as needed   Outcome: Progressing  Goal: Attend and participate in unit activities, including therapeutic, recreational, and educational groups  Description: Interventions:  -Encourage Visitation and family involvement in care  Outcome: Progressing  Goal: Complete daily ADLs, including personal hygiene independently, as able  Description: Interventions:  - Observe, teach, and assist patient with ADLS  - Monitor and promote a balance of rest/activity, with adequate nutrition and elimination   Outcome: Progressing     Problem: Ineffective Coping  Goal: Demonstrates healthy coping skills  Outcome: Progressing  Goal: Participates in unit  activities  Description: Interventions:  - Provide therapeutic environment   - Provide required programming   - Redirect inappropriate behaviors   Outcome: Progressing     Problem: Depression  Goal: Treatment Goal: Demonstrate behavioral control of depressive symptoms, verbalize feelings of improved mood/affect, and adopt new coping skills prior to discharge  Outcome: Progressing  Goal: Verbalize thoughts and feelings  Description: Interventions:  - Assess and re-assess patient's level of risk   - Engage patient in 1:1 interactions, daily, for a minimum of 15 minutes   - Encourage patient to express feelings, fears, frustrations, hopes   Outcome: Progressing  Goal: Refrain from harming self  Description: Interventions:  - Monitor patient closely, per order   - Supervise medication ingestion, monitor effects and side effects   Outcome: Progressing  Goal: Refrain from isolation  Description: Interventions:  - Develop a trusting relationship   - Encourage socialization   Outcome: Progressing  Goal: Refrain from self-neglect  Outcome: Progressing  Goal: Attend and participate in unit activities, including therapeutic, recreational, and educational groups  Description: Interventions:  - Provide therapeutic and educational activities daily, encourage attendance and participation, and document same in the medical record   Outcome: Progressing  Goal: Complete daily ADLs, including personal hygiene independently, as able  Description: Interventions:  - Observe, teach, and assist patient with ADLS  -  Monitor and promote a balance of rest/activity, with adequate nutrition and elimination   Outcome: Progressing     Problem: Anxiety  Goal: Anxiety is at manageable level  Description: Interventions:  - Assess and monitor patient's anxiety level.   - Monitor for signs and symptoms (heart palpitations, chest pain, shortness of breath, headaches, nausea, feeling jumpy, restlessness, irritable, apprehensive).   - Collaborate with  interdisciplinary team and initiate plan and interventions as ordered.  - [place] patient to unit/surroundings  - Explain treatment plan  - Encourage participation in care  - Encourage verbalization of concerns/fears  - Identify coping mechanisms  - Assist in developing anxiety-reducing skills  - Administer/offer alternative therapies  - Limit or eliminate stimulants  Outcome: Progressing     Problem: Alteration in Orientation  Goal: Treatment Goal: Demonstrate a reduction of confusion and improved orientation to person, place, time and/or situation upon discharge, according to optimum baseline/ability  Outcome: Progressing  Goal: Complete daily ADLs, including personal hygiene independently, as able  Description: Interventions:  - Observe, teach, and assist patient with ADLS  Outcome: Progressing     Problem: Electroconvulsive therapy (ECT)  Goal: Treatment Goal: Demonstrate a reduction of confusion and improved orientation to person, place, time and/or situation upon discharge, according to optimum baseline/ability  Outcome: Progressing  Goal: Achieves stable or improved neurological status  Description: INTERVENTIONS  - Monitor and report changes in neurological status  - Monitor vital signs such as temperature, blood pressure, glucose, and any other labs ordered   - Initiate measures to prevent increased intracranial pressure  - Monitor for seizure activity and implement precautions if appropriate      Outcome: Progressing  Goal: Minimal or absence of nausea and/or vomiting  Description: INTERVENTIONS:  - Administer IV fluids if ordered to ensure adequate hydration  - Maintain NPO status until nausea and vomiting are resolved  - Nasogastric tube if ordered  - Administer ordered antiemetic medications as needed  - Provide nonpharmacologic comfort measures as appropriate  - Advance diet as tolerated, if ordered  - Consider nutrition services referral to assist patient with adequate nutrition and appropriate food  choices  Outcome: Progressing  Goal: Maintains adequate nutritional intake  Description: INTERVENTIONS:  - Monitor percentage of each meal consumed  - Identify factors contributing to decreased intake, treat as appropriate  - Assist with meals as needed  - Monitor I&O, weight, and lab values if indicated  - Obtain nutrition services referral as needed  Outcome: Progressing     Problem: DISCHARGE PLANNING - CARE MANAGEMENT  Goal: Discharge to post-acute care or home with appropriate resources  Description: INTERVENTIONS:  - Conduct assessment to determine patient/family and health care team treatment goals, and need for post-acute services based on payer coverage, community resources, and patient preferences, and barriers to discharge  - Address psychosocial, clinical, and financial barriers to discharge as identified in assessment in conjunction with the patient/family and health care team  - Arrange appropriate level of post-acute services according to patient’s   needs and preference and payer coverage in collaboration with the physician and health care team  - Communicate with and update the patient/family, physician, and health care team regarding progress on the discharge plan  - Arrange appropriate transportation to post-acute venues  Outcome: Progressing     Problem: Alteration in Thoughts and Perception  Goal: Treatment Goal: Gain control of psychotic behaviors/thinking, reduce/eliminate presenting symptoms and demonstrate improved reality functioning upon discharge  Outcome: Not Progressing

## 2024-06-11 NOTE — NURSING NOTE
"Patient is visible on the unit and social with select peers. Patient is compliant with medications. Patient stated \"I had a bad day yesterday with the voices. I am tired of them.\" Patient states the voices get worse when I am I am stressed. Patient doesn't answer when he was asked about his stressors.   "

## 2024-06-11 NOTE — NURSING NOTE
Alberto maintained on ongoing SAFE precaution without incident on this shift.  Awake, alert , visible, pleasant and cooperative upon approach.   Attended and participated in 8 out of 9 groups today.  Continues to be compliant with medication regimen and had snack.  Alberto spend most of his time watching TV, playing cards and other games with selective peers.  Denies any depression or anxiety.  No delusion or A/T/V hallucination noted.  Behavioral control.

## 2024-06-12 PROCEDURE — 99232 SBSQ HOSP IP/OBS MODERATE 35: CPT | Performed by: PSYCHIATRY & NEUROLOGY

## 2024-06-12 RX ADMIN — FLUTICASONE PROPIONATE 1 SPRAY: 50 SPRAY, METERED NASAL at 08:42

## 2024-06-12 RX ADMIN — NICOTINE POLACRILEX 2 MG: 2 GUM, CHEWING ORAL at 21:04

## 2024-06-12 RX ADMIN — GLYCOPYRROLATE 1 MG: 1 TABLET ORAL at 17:07

## 2024-06-12 RX ADMIN — PROPRANOLOL HYDROCHLORIDE 10 MG: 10 TABLET ORAL at 21:04

## 2024-06-12 RX ADMIN — ESCITALOPRAM OXALATE 20 MG: 10 TABLET, FILM COATED ORAL at 08:41

## 2024-06-12 RX ADMIN — NICOTINE POLACRILEX 2 MG: 2 GUM, CHEWING ORAL at 17:08

## 2024-06-12 RX ADMIN — MELATONIN TAB 3 MG 3 MG: 3 TAB at 21:04

## 2024-06-12 RX ADMIN — CLOZAPINE 500 MG: 100 TABLET ORAL at 21:04

## 2024-06-12 RX ADMIN — NICOTINE POLACRILEX 2 MG: 2 GUM, CHEWING ORAL at 12:59

## 2024-06-12 RX ADMIN — GLYCOPYRROLATE 1 MG: 1 TABLET ORAL at 08:41

## 2024-06-12 RX ADMIN — ARIPIPRAZOLE 10 MG: 5 TABLET ORAL at 08:41

## 2024-06-12 RX ADMIN — SENNOSIDES AND DOCUSATE SODIUM 2 TABLET: 8.6; 5 TABLET ORAL at 08:41

## 2024-06-12 RX ADMIN — NICOTINE POLACRILEX 2 MG: 2 GUM, CHEWING ORAL at 08:41

## 2024-06-12 RX ADMIN — SENNOSIDES AND DOCUSATE SODIUM 2 TABLET: 8.6; 5 TABLET ORAL at 17:08

## 2024-06-12 RX ADMIN — GLYCOPYRROLATE 1 MG: 1 TABLET ORAL at 21:04

## 2024-06-12 RX ADMIN — CYANOCOBALAMIN TAB 1000 MCG 1000 MCG: 1000 TAB at 08:41

## 2024-06-12 RX ADMIN — HYDROCHLOROTHIAZIDE 12.5 MG: 12.5 TABLET ORAL at 08:40

## 2024-06-12 RX ADMIN — PROPRANOLOL HYDROCHLORIDE 10 MG: 10 TABLET ORAL at 08:41

## 2024-06-12 RX ADMIN — METFORMIN HYDROCHLORIDE 500 MG: 500 TABLET, FILM COATED ORAL at 08:40

## 2024-06-12 RX ADMIN — METFORMIN HYDROCHLORIDE 500 MG: 500 TABLET, FILM COATED ORAL at 17:07

## 2024-06-12 RX ADMIN — LORATADINE 10 MG: 10 TABLET ORAL at 08:41

## 2024-06-12 RX ADMIN — POLYETHYLENE GLYCOL 3350 17 G: 17 POWDER, FOR SOLUTION ORAL at 08:41

## 2024-06-12 NOTE — PROGRESS NOTES
06/12/24 0733   Team Meeting   Meeting Type Daily Rounds   Team Members Present   Team Members Present Physician;Nurse;;Other (Discipline and Name)   Patient/Family Present   Patient Present No   Patient's Family Present No     In attendance:  Dr. Alex Thomas, MD Dr. Jordan Holter, DO Eveline Hunt, HOLLI Taylor, RN  Luisana Alvarado, [facility]  Carla Lux, [facility]  MATILDA Daniel.S.    Groups: 10/10    Pt reports ongoing auditory hallucinations. Pt is flat and more reserved but has been visible and present on the unit. Pt's next ECT 6/21.

## 2024-06-12 NOTE — NURSING NOTE
"Pt is accepting of medications without incidence and meal compliant. Pt is visible in the milieu sitting with peers in lounge conversing and playing cards. Pt reports AH are \"the usual\" and feeling \" really tired\", but denies all other s/s currently. Pt has been attending groups and socializing with peers. No new concerns at this time.   "

## 2024-06-12 NOTE — PLAN OF CARE
Problem: Alteration in Thoughts and Perception  Goal: Verbalize thoughts and feelings  Description: Interventions:  - Promote a nonjudgmental and trusting relationship with the patient through active listening and therapeutic communication  - Assess patient's level of functioning, behavior and potential for risk  - Engage patient in 1 on 1 interactions  - Encourage patient to express fears, feelings, frustrations, and discuss symptoms    - [place] patient to reality, help patient recognize reality-based thinking   - Administer medications as ordered and assess for potential side effects  - Provide the patient education related to the signs and symptoms of the illness and desired effects of prescribed medications  Outcome: Progressing  Goal: Refrain from acting on delusional thinking/internal stimuli  Description: Interventions:  - Monitor patient closely, per order   - Utilize least restrictive measures   - Set reasonable limits, give positive feedback for acceptable   - Administer medications as ordered and monitor of potential side effects  Outcome: Progressing  Goal: Agree to be compliant with medication regime, as prescribed and report medication side effects  Description: Interventions:  - Offer appropriate PRN medication and supervise ingestion; conduct AIMS, as needed   Outcome: Progressing  Goal: Attend and participate in unit activities, including therapeutic, recreational, and educational groups  Description: Interventions:  -Encourage Visitation and family involvement in care  Outcome: Progressing  Goal: Recognize dysfunctional thoughts, communicate reality-based thoughts at the time of discharge  Description: Interventions:  - Provide medication and psycho-education to assist patient in compliance and developing insight into his/her illness   Outcome: Progressing  Goal: Complete daily ADLs, including personal hygiene independently, as able  Description: Interventions:  - Observe, teach, and assist  patient with ADLS  - Monitor and promote a balance of rest/activity, with adequate nutrition and elimination   Outcome: Progressing     Problem: Ineffective Coping  Goal: Identifies ineffective coping skills  Outcome: Progressing  Goal: Identifies healthy coping skills  Outcome: Progressing  Goal: Demonstrates healthy coping skills  Outcome: Progressing  Goal: Participates in unit activities  Description: Interventions:  - Provide therapeutic environment   - Provide required programming   - Redirect inappropriate behaviors   Outcome: Progressing     Problem: Depression  Goal: Verbalize thoughts and feelings  Description: Interventions:  - Assess and re-assess patient's level of risk   - Engage patient in 1:1 interactions, daily, for a minimum of 15 minutes   - Encourage patient to express feelings, fears, frustrations, hopes   Outcome: Progressing  Goal: Refrain from harming self  Description: Interventions:  - Monitor patient closely, per order   - Supervise medication ingestion, monitor effects and side effects   Outcome: Progressing  Goal: Refrain from isolation  Description: Interventions:  - Develop a trusting relationship   - Encourage socialization   Outcome: Progressing  Goal: Refrain from self-neglect  Outcome: Progressing  Goal: Attend and participate in unit activities, including therapeutic, recreational, and educational groups  Description: Interventions:  - Provide therapeutic and educational activities daily, encourage attendance and participation, and document same in the medical record   Outcome: Progressing  Goal: Complete daily ADLs, including personal hygiene independently, as able  Description: Interventions:  - Observe, teach, and assist patient with ADLS  -  Monitor and promote a balance of rest/activity, with adequate nutrition and elimination   Outcome: Progressing     Problem: Anxiety  Goal: Anxiety is at manageable level  Description: Interventions:  - Assess and monitor patient's anxiety  level.   - Monitor for signs and symptoms (heart palpitations, chest pain, shortness of breath, headaches, nausea, feeling jumpy, restlessness, irritable, apprehensive).   - Collaborate with interdisciplinary team and initiate plan and interventions as ordered.  - [place] patient to unit/surroundings  - Explain treatment plan  - Encourage participation in care  - Encourage verbalization of concerns/fears  - Identify coping mechanisms  - Assist in developing anxiety-reducing skills  - Administer/offer alternative therapies  - Limit or eliminate stimulants  Outcome: Progressing     Problem: Alteration in Orientation  Goal: Interact with staff daily  Description: Interventions:  - Assess and re-assess patient's level of orientation  - Engage patient in 1 on 1 interactions, daily, for a minimum of 15 minutes   - Establish rapport/trust with patient   Outcome: Progressing  Goal: Allow medical examinations, as recommended  Description: Interventions:  - Provide physical/neurological exams and/or referrals, per provider   Outcome: Progressing     Problem: Alteration in Thoughts and Perception  Goal: Treatment Goal: Gain control of psychotic behaviors/thinking, reduce/eliminate presenting symptoms and demonstrate improved reality functioning upon discharge  Outcome: Not Progressing     Problem: Depression  Goal: Treatment Goal: Demonstrate behavioral control of depressive symptoms, verbalize feelings of improved mood/affect, and adopt new coping skills prior to discharge  Outcome: Not Progressing

## 2024-06-12 NOTE — NURSING NOTE
Pt is present on the milieu and social with select peers. He consumed 100 % of dinner. Took his medications without incidence. Nicorette gum given at 2110. Pt endorses AH, anxiety and depression. He played cards with peers and attended groups. Pt offered no complaints. No behavioral issues.

## 2024-06-12 NOTE — PLAN OF CARE
Problem: Ineffective Coping  Goal: Identifies ineffective coping skills  Outcome: Progressing  Goal: Identifies healthy coping skills  Outcome: Progressing  Goal: Demonstrates healthy coping skills  Outcome: Progressing  Goal: Participates in unit activities  Description: Interventions:  - Provide therapeutic environment   - Provide required programming   - Redirect inappropriate behaviors   Outcome: Progressing    Attended 18/19 groups offered during the past 2 days.

## 2024-06-12 NOTE — SOCIAL WORK
Pt requested virtual visit scheduling from BRANDY. Pt had no email and had not confirmed date/time for visit. SW encouraged pt to communicate with his family and visit can be scheduled. Pt became slightly irritable when told he cannot have virtual visits at any time in the middle of the day, and that they must be scheduled in the evenings.   Pt has requested virtual visit 6/13 at 6pm.

## 2024-06-12 NOTE — PROGRESS NOTES
Progress Note - Behavioral Health   Alberto Berumen 28 y.o. male MRN: [id number]  Unit/Bed#: [facility] 101-02 Encounter: [id number]    Assessment & Plan   Principal Problem:    Schizoaffective disorder, bipolar type (HCC)  Active Problems:    GERD (gastroesophageal reflux disease)    Medical clearance for psychiatric admission    Tobacco abuse    T wave inversion in EKG    Chronic idiopathic constipation    Confluent and reticulate papillomatosis    Primary hypertension    Elevated hemoglobin A1c    Bilateral lower extremity edema      Behavior over the last 24 hours:  unchanged  Sleep: normal  Appetite: normal  Medication side effects: Yes The patient has been experiencing drooling which he states has improved since starting glycopyrrolate (Robinul), but is still present.   ROS: no complaints    Subjective: The patient is a 28 year old male with schizoaffective disorder bipolar type. The patient reports that his symptoms have been worse over the past few days. He reports feeling stressed and continuing to hear voices that threaten to kill him and his family. He reports that today the voices are the same severity that he has been experiencing over the past few days. He additionally has been experiencing paranoid delusions that someone is going to harm him and his family. He rates his depression to be a 7/10 in severity and endorses suicidal ideation without plans. He denies homicidal ideation. The patient states that being outside, walking, and talking with people helps his symptoms.   Nursing reports that the patient reported continued auditory hallucinations yesterday. They state that he appears flat, and less bright than usual, although visible. He attended 10/10 groups yesterday. His next ECT is scheduled for June 21.     Mental Status Evaluation:  Appearance:  disheveled, older than stated age, and overweight. The patient is dressed in hospital clothing, clean shaved, and appropriate hygiene. The patient only  occasionally makes eye contact with the interviewer.    Behavior:  Patient is cooperative and pleasant. They are slow in movements and often answer questions with their eyes closed and nod yes or no.    Speech:  normal pitch and normal volume   Mood:  Stressed   Affect:  flat, mood-congruent, and stable.    Thought Process:  normal and logical   Associations: intact associations   Thought Content:  delusions  persecutory and paranoid delusions that someone is out to get him and his family. Reports suicidal ideation without plan. Denies homicidal ideation. Denies phobias, obsessions, or compulsions. Denies any grandiose, somatic, or bizarre delusions.    Perceptual Disturbances: Auditory hallucinations without commands that state they will kill him and his family. The patient denies other hallucinations, illusions, personalization, or realization. Does not appear to be responding to internal stimuli.    Risk Potential: Suicidal Ideations without plan  Homicidal Ideations none  Potential for Aggression No   Sensorium:  person, place, and time/date   Memory:  Immediate recall, recent memory, and remote memory intact.   Consciousness:  alert and awake    Attention and concentration: attention span and concentration were age appropriate   Intelligence: Average intelligence   Impulse Control: Does not exhibit poor impulse control   Insight:  age appropriate and good   Judgment: age appropriate and good   Gait/Station: slow   Motor Activity: no abnormal movements     Progress Toward Goals: The patient is continuing to experience auditory hallucinations that are voices threatening to kill him and his family along with paranoid delusions that someone is going to harm him and his family. He states that the past few days have been difficult for him and he has been experiencing suicidal ideation without plans. He rates his depression a 7/10, which is slightly worse than yesterday which he rated a 6-7/10. The patient states that  he is unsure if his medications are helping him but believes that the ECTs do help.     Recommended Treatment: Continue with group therapy, milieu therapy and occupational therapy.      Risks, benefits and possible side effects of Medications:   Risks, benefits, and possible side effects of medications explained to patient and patient verbalizes understanding.      Medications: all current active meds have been reviewed.      Counseling / Coordination of Care  Total floor / unit time spent today 15 minutes. Greater than 50% of total time was spent with the patient and / or family counseling and / or coordination of care.

## 2024-06-13 LAB
BASOPHILS # BLD AUTO: 0.07 THOUSANDS/ÂΜL (ref 0–0.1)
BASOPHILS NFR BLD AUTO: 1 % (ref 0–1)
CLOZAPINE SERPL-MCNC: 458 NG/ML (ref 350–900)
EOSINOPHIL # BLD AUTO: 0.41 THOUSAND/ÂΜL (ref 0–0.61)
EOSINOPHIL NFR BLD AUTO: 3 % (ref 0–6)
ERYTHROCYTE [DISTWIDTH] IN BLOOD BY AUTOMATED COUNT: 14.5 % (ref 11.6–15.1)
HCT VFR BLD AUTO: 43.8 % (ref 36.5–49.3)
HGB BLD-MCNC: 13.1 G/DL (ref 12–17)
IMM GRANULOCYTES # BLD AUTO: 0.05 THOUSAND/UL (ref 0–0.2)
IMM GRANULOCYTES NFR BLD AUTO: 0 % (ref 0–2)
LYMPHOCYTES # BLD AUTO: 3.92 THOUSANDS/ÂΜL (ref 0.6–4.47)
LYMPHOCYTES NFR BLD AUTO: 31 % (ref 14–44)
MCH RBC QN AUTO: 23.7 PG (ref 26.8–34.3)
MCHC RBC AUTO-ENTMCNC: 29.9 G/DL (ref 31.4–37.4)
MCV RBC AUTO: 79 FL (ref 82–98)
MONOCYTES # BLD AUTO: 0.91 THOUSAND/ÂΜL (ref 0.17–1.22)
MONOCYTES NFR BLD AUTO: 7 % (ref 4–12)
NEUTROPHILS # BLD AUTO: 7.37 THOUSANDS/ÂΜL (ref 1.85–7.62)
NEUTS SEG NFR BLD AUTO: 58 % (ref 43–75)
NRBC BLD AUTO-RTO: 0 /100 WBCS
PLATELET # BLD AUTO: 287 THOUSANDS/UL (ref 149–390)
PMV BLD AUTO: 10.1 FL (ref 8.9–12.7)
RBC # BLD AUTO: 5.52 MILLION/UL (ref 3.88–5.62)
WBC # BLD AUTO: 12.73 THOUSAND/UL (ref 4.31–10.16)

## 2024-06-13 PROCEDURE — 99232 SBSQ HOSP IP/OBS MODERATE 35: CPT | Performed by: PSYCHIATRY & NEUROLOGY

## 2024-06-13 PROCEDURE — 85025 COMPLETE CBC W/AUTO DIFF WBC: CPT | Performed by: PSYCHIATRY & NEUROLOGY

## 2024-06-13 PROCEDURE — 80159 DRUG ASSAY CLOZAPINE: CPT | Performed by: PSYCHIATRY & NEUROLOGY

## 2024-06-13 RX ADMIN — GLYCOPYRROLATE 1 MG: 1 TABLET ORAL at 21:36

## 2024-06-13 RX ADMIN — GLYCOPYRROLATE 1 MG: 1 TABLET ORAL at 08:37

## 2024-06-13 RX ADMIN — POLYETHYLENE GLYCOL 3350 17 G: 17 POWDER, FOR SOLUTION ORAL at 08:36

## 2024-06-13 RX ADMIN — NICOTINE POLACRILEX 2 MG: 2 GUM, CHEWING ORAL at 17:46

## 2024-06-13 RX ADMIN — NICOTINE POLACRILEX 2 MG: 2 GUM, CHEWING ORAL at 08:47

## 2024-06-13 RX ADMIN — HYDROCHLOROTHIAZIDE 12.5 MG: 12.5 TABLET ORAL at 08:38

## 2024-06-13 RX ADMIN — SENNOSIDES AND DOCUSATE SODIUM 2 TABLET: 8.6; 5 TABLET ORAL at 08:37

## 2024-06-13 RX ADMIN — MELATONIN TAB 3 MG 3 MG: 3 TAB at 21:36

## 2024-06-13 RX ADMIN — FLUTICASONE PROPIONATE 1 SPRAY: 50 SPRAY, METERED NASAL at 08:47

## 2024-06-13 RX ADMIN — NICOTINE POLACRILEX 2 MG: 2 GUM, CHEWING ORAL at 12:43

## 2024-06-13 RX ADMIN — CYANOCOBALAMIN TAB 1000 MCG 1000 MCG: 1000 TAB at 08:37

## 2024-06-13 RX ADMIN — GLYCOPYRROLATE 1 MG: 1 TABLET ORAL at 17:30

## 2024-06-13 RX ADMIN — PROPRANOLOL HYDROCHLORIDE 10 MG: 10 TABLET ORAL at 08:38

## 2024-06-13 RX ADMIN — LORATADINE 10 MG: 10 TABLET ORAL at 08:38

## 2024-06-13 RX ADMIN — AMLODIPINE BESYLATE 5 MG: 5 TABLET ORAL at 08:37

## 2024-06-13 RX ADMIN — ESCITALOPRAM OXALATE 20 MG: 10 TABLET, FILM COATED ORAL at 08:38

## 2024-06-13 RX ADMIN — METFORMIN HYDROCHLORIDE 500 MG: 500 TABLET, FILM COATED ORAL at 08:37

## 2024-06-13 RX ADMIN — ARIPIPRAZOLE 10 MG: 5 TABLET ORAL at 08:38

## 2024-06-13 RX ADMIN — METFORMIN HYDROCHLORIDE 500 MG: 500 TABLET, FILM COATED ORAL at 17:30

## 2024-06-13 RX ADMIN — CLOZAPINE 500 MG: 100 TABLET ORAL at 21:36

## 2024-06-13 RX ADMIN — SENNOSIDES AND DOCUSATE SODIUM 2 TABLET: 8.6; 5 TABLET ORAL at 17:30

## 2024-06-13 RX ADMIN — PROPRANOLOL HYDROCHLORIDE 10 MG: 10 TABLET ORAL at 21:36

## 2024-06-13 NOTE — PROGRESS NOTES
"Progress Note - Behavioral Health   Alberto Berumen 28 y.o. male MRN: [id number]  Unit/Bed#: [facility] 101-02 Encounter: [id number]    Assessment & Plan   Principal Problem:    Schizoaffective disorder, bipolar type (HCC)  Active Problems:    GERD (gastroesophageal reflux disease)    Medical clearance for psychiatric admission    Tobacco abuse    T wave inversion in EKG    Chronic idiopathic constipation    Confluent and reticulate papillomatosis    Primary hypertension    Elevated hemoglobin A1c    Bilateral lower extremity edema      Behavior over the last 24 hours:  unchanged  Sleep: normal  Appetite: normal  Medication side effects: Yes The patient experiences drooling, which has been improving since starting glycopyrrolate (Robinul).   ROS: no complaints    Subjective: The patient is a 28 year old male with schizoaffective disorder bipolar type. The patient reports that the past few days have been bad for him and his symptoms. He reports that he is feeling depressed and he continues to have symptoms of voices that are telling him they will kill him and his family, along with delusions that someone is going to kill him and his family. He states that he is scared of dying and believes if he was in the outside world that he \"would die\". He denies current thoughts of suicide, although he reported that he did experience those thoughts yesterday. He rates his current mood as depressed and rates his depression a 7-8/10 in severity. He denies any homicidal ideation.   Nursing reports that the patient appeared tired and flat, but social yesterday. The patient denies signs and symptoms to nursing. He was pleasant and attended 6/9 groups.      Mental Status Evaluation:  Appearance:  disheveled, older than stated age, and overweight. Dressed in hospital clothing, appropriate hygiene, clean shaved, appropriate eye contact.    Behavior:  Patient is pleasant, cooperative, slow in responses, and slow in movements.    Speech:  " "normal pitch and normal volume   Mood:  depressed   Affect:  flat, mood-congruent, and stable   Thought Process:  normal and logical   Associations: intact associations   Thought Content:  delusions  persecutory, paranoid delusions that someone is going to kill him and his family. He believes that he would not be safe in the outside world and that he will die if he was not here. He states he is scared of dying. He denies grandiose or bizarre delusions. He denies phobias, obsessions, and compulsions.    Perceptual Disturbances: Auditory hallucinations without commands. The patient states he experiences voices that are saying they will kill him and his family. He denies any other hallucinations or illusions. He does not appear to be responding to internal stimuli.    Risk Potential: Suicidal Ideations none  Homicidal Ideations none  Potential for Aggression No   Sensorium:  person, place, time/date, and situation   Memory:  Immediate recall, recent memory, and remote memory intact.    Consciousness:  alert and awake    Attention and concentration: attention span age appropriate. Patient able to spell \"world\" forwards and backwards. The patient has difficulty completing serial 7s, able to complete 100-7.    Intelligence: Average intelligence.    Impulse control: Does not exhibit signs of poor impulse control.    Insight:  age appropriate and good   Judgment: age appropriate and good   Gait/Station: slow   Motor Activity: no abnormal movements     Progress Toward Goals: The patient continues to experience auditory hallucinations and persecutory delusions that someone is going to kill him and his family. He expresses a fear of dying, especially if he was in the outside world. He rates his depression slightly higher today, a 7-8/10. He denies current suicidal ideations but states he has been experiencing them yesterday. He states that his medications sometimes help him and sometimes do not. He believes the ECTs do help " him, and he has another one scheduled for June 21.     Recommended Treatment: Continue with group therapy, milieu therapy and occupational therapy.      Risks, benefits and possible side effects of Medications:   Risks, benefits, and possible side effects of medications explained to patient and patient verbalizes understanding.      Medications: all current active meds have been reviewed.      Counseling / Coordination of Care  Total floor / unit time spent today 20 minutes. Greater than 50% of total time was spent with the patient and / or family counseling and / or coordination of care.

## 2024-06-13 NOTE — NURSING NOTE
Patient is visible and social on the unit.  It took several attempts to wake patient this morning for medications and breakfast. Patient's mood slightly improved. Patient has auditory hallucinations. Denies SI. Patient compliant with medications and attended groups.

## 2024-06-13 NOTE — NURSING NOTE
Pt is present on the milieu and social with select peers. He consumed 100 % of dinner. Took his medications without incidence. Pt endorses AH and depression. Pt has flat and blunted affect. Pt is polite and cooperative, but quiet and guarded. He attended Community, Nursing, and Wrap up groups. No behavioral issues.

## 2024-06-13 NOTE — PROGRESS NOTES
06/13/24 0732   Team Meeting   Meeting Type Daily Rounds   Team Members Present   Team Members Present Physician;Nurse;;Other (Discipline and Name)   Patient/Family Present   Patient Present No   Patient's Family Present No     In attendance:  Dr. Jordan Holter, DO Eveline Hunt, HOLLI Taylor, MIGUEL Alvarado, [facility]  Carla Lux, [facility]  MATILDA Daniel.S.    Groups: 6/9    Pt reports feeling tired; presents as flat. Pt denies symptoms. Pt is social with select peers.

## 2024-06-13 NOTE — SOCIAL WORK
SW checked in with pt.   Pt provided sister's email and confirmed virtual visit for tonight at 6pm.   Pt denied any other concerns or needs at this time.

## 2024-06-14 PROCEDURE — 99232 SBSQ HOSP IP/OBS MODERATE 35: CPT | Performed by: PSYCHIATRY & NEUROLOGY

## 2024-06-14 RX ADMIN — CLOZAPINE 500 MG: 100 TABLET ORAL at 21:21

## 2024-06-14 RX ADMIN — NICOTINE POLACRILEX 2 MG: 2 GUM, CHEWING ORAL at 17:11

## 2024-06-14 RX ADMIN — SENNOSIDES AND DOCUSATE SODIUM 2 TABLET: 8.6; 5 TABLET ORAL at 17:11

## 2024-06-14 RX ADMIN — POLYETHYLENE GLYCOL 3350 17 G: 17 POWDER, FOR SOLUTION ORAL at 08:46

## 2024-06-14 RX ADMIN — FLUTICASONE PROPIONATE 1 SPRAY: 50 SPRAY, METERED NASAL at 08:46

## 2024-06-14 RX ADMIN — SENNOSIDES AND DOCUSATE SODIUM 2 TABLET: 8.6; 5 TABLET ORAL at 08:46

## 2024-06-14 RX ADMIN — GLYCOPYRROLATE 1 MG: 1 TABLET ORAL at 21:21

## 2024-06-14 RX ADMIN — MELATONIN TAB 3 MG 3 MG: 3 TAB at 21:21

## 2024-06-14 RX ADMIN — ARIPIPRAZOLE 10 MG: 5 TABLET ORAL at 08:45

## 2024-06-14 RX ADMIN — LORATADINE 10 MG: 10 TABLET ORAL at 08:46

## 2024-06-14 RX ADMIN — GLYCOPYRROLATE 1 MG: 1 TABLET ORAL at 17:10

## 2024-06-14 RX ADMIN — NICOTINE POLACRILEX 2 MG: 2 GUM, CHEWING ORAL at 12:51

## 2024-06-14 RX ADMIN — METFORMIN HYDROCHLORIDE 500 MG: 500 TABLET, FILM COATED ORAL at 17:11

## 2024-06-14 RX ADMIN — METFORMIN HYDROCHLORIDE 500 MG: 500 TABLET, FILM COATED ORAL at 08:46

## 2024-06-14 RX ADMIN — AMLODIPINE BESYLATE 5 MG: 5 TABLET ORAL at 08:46

## 2024-06-14 RX ADMIN — CYANOCOBALAMIN TAB 1000 MCG 1000 MCG: 1000 TAB at 08:46

## 2024-06-14 RX ADMIN — NICOTINE POLACRILEX 2 MG: 2 GUM, CHEWING ORAL at 08:46

## 2024-06-14 RX ADMIN — NICOTINE POLACRILEX 2 MG: 2 GUM, CHEWING ORAL at 21:23

## 2024-06-14 RX ADMIN — ESCITALOPRAM OXALATE 20 MG: 10 TABLET, FILM COATED ORAL at 08:46

## 2024-06-14 RX ADMIN — PROPRANOLOL HYDROCHLORIDE 10 MG: 10 TABLET ORAL at 08:46

## 2024-06-14 RX ADMIN — PROPRANOLOL HYDROCHLORIDE 10 MG: 10 TABLET ORAL at 21:21

## 2024-06-14 RX ADMIN — HYDROCHLOROTHIAZIDE 12.5 MG: 12.5 TABLET ORAL at 08:46

## 2024-06-14 RX ADMIN — GLYCOPYRROLATE 1 MG: 1 TABLET ORAL at 08:46

## 2024-06-14 NOTE — NURSING NOTE
Patient has been visible and social on the unit. He states that he still has the auditory hallucinations. Denies SI. Compliant with medications. No behavior issues.

## 2024-06-14 NOTE — PROGRESS NOTES
06/14/24 0729   Team Meeting   Meeting Type Daily Rounds   Team Members Present   Team Members Present Physician;Nurse;;Other (Discipline and Name)   Patient/Family Present   Patient Present No   Patient's Family Present No     In attendance:  MD Eveline Gamino, HOLLI Lucas, MIGUEL Alvarado, [facility]  Carla Lux, [facility]  MATILDA Daniel.S.    Groups: 8/10    Pt's virtual visit with his sister went well. Pt reports ongoing auditory hallucinations. Pt is visible and social with select peers. Pt was refusing labs until encouraged by staff.

## 2024-06-14 NOTE — PROGRESS NOTES
Progress Note - Behavioral Health   Alberto Berumen 28 y.o. male MRN: [id number]  Unit/Bed#: [facility] 101-02 Encounter: [id number]    Assessment & Plan   Principal Problem:    Schizoaffective disorder, bipolar type ([facility])  Active Problems:    GERD (gastroesophageal reflux disease)    Medical clearance for psychiatric admission    Tobacco abuse    T wave inversion in EKG    Chronic idiopathic constipation    Confluent and reticulate papillomatosis    Primary hypertension    Elevated hemoglobin A1c    Bilateral lower extremity edema      Behavior over the last 24 hours:  unchanged  Sleep: normal  Appetite: normal  Medication side effects: Yes The patient has been experiencing drooling. He states that the drooling has improved since started on glycopyrrolate (Robinul) but has not fully subsided.   ROS: no complaints    Subjective: The patient is a 28 year old male with schizoaffective disorder bipolar type. The patient reports that he continues to have a difficult past couple days. He continues to hear voices that are saying they will kill him and his family. Yesterday he stated that the voices were louder than usual, but today he states that they have been moderate. He continues to experience paranoid delusions that someone will kill him and his family. He believes that if he was not in the hospital he would not be safe, and feels safe being here. He reports feelings of depression that he states were an 8/10 yesterday but are now at a 6.5/10 this morning. He denies current suicidal or homicidal ideation or thoughts of self harm.   Nursing reports that the patient was visible and social yesterday. He reported to nursing yesterday that he continues to experience auditory hallucinations. Nursing reported that he had a virtual visit with his sister yesterday that went well. He has not had any behavioral issues but was reluctant at first to receive his lab work. He did eventually receive lab work with encouragement from the  staff. The patient attended 8/10 groups.     Mental Status Evaluation:  Appearance:  casually dressed, older than stated age, and overweight. The patient is dressed in a black hooded sweatshirt and in hospital scrubs. He is clean shaved. He makes appropriate eye contact.    Behavior:  Cooperative, pleasant. Movements and responses slow at times. Often shakes his head yes or no to questions.    Speech:  normal pitch and normal volume   Mood:  sad and tired   Affect:  flat, mood-congruent, and stable   Thought Process:  normal and logical   Associations: intact associations   Thought Content:  delusions  persecutory and paranoid delusions that there is someone who is going to kill him and his family. Does not believe he would be safe if he was not in the hospital.Denies grandiose, bizarre, and somatic delusions. Denies obsessions, compulsions, and phobias.    Perceptual Disturbances: Auditory hallucinations without commands. Patient experiences voices that are stating they will kill him and his family. Denies all other hallucinations, illusions, personalization, or realization. Does not appear to be responding to internal stimuli.   Risk Potential: Suicidal Ideations none  Homicidal Ideations none  Potential for Aggression No   Sensorium:  person, place, time/date, and situation   Memory:  Immediate, recent, and remote memory intact.    Consciousness:  alert and awake    Attention and concentration: attention span and concentration were age appropriate   Intelligence: Average intelligence   Impulse control: No signs of poor impulse control.    Insight:  age appropriate and good   Judgment: age appropriate and good   Gait/Station: slow   Motor Activity: no abnormal movements     Progress Toward Goals: The patient continues to experience auditory hallucinations and persecutory, paranoid delusions. The patient reports that today his depression is a 6.5/10, which has improved from yesterday afternoon which was a 8/10. He  does not currently have suicidal ideations. He believes that his medications sometimes help him and that his ECTs are helping him. His next ECT is schedules for June 21.     Recommended Treatment: Continue with group therapy, milieu therapy and occupational therapy.      Risks, benefits and possible side effects of Medications:   Risks, benefits, and possible side effects of medications explained to patient and patient verbalizes understanding.      Medications: all current active meds have been reviewed.    Labs:   Lab Results   Component Value Date    WBC 12.73 (H) 06/13/2024    RBC 5.52 06/13/2024    HGB 13.1 06/13/2024    HCT 43.8 06/13/2024     06/13/2024    RDW 14.5 06/13/2024    NEUTROABS 7.37 06/13/2024    SODIUM 136 06/03/2024    K 3.8 06/03/2024    CL 96 06/03/2024    CO2 29 06/03/2024    BUN 10 06/03/2024    CREATININE 1.00 06/03/2024    GLUC 97 06/03/2024    GLUF 103 (H) 05/29/2024    CALCIUM 10.1 06/03/2024    AST 33 06/03/2024    ALT 72 (H) 06/03/2024    ALKPHOS 80 06/03/2024    TP 8.5 (H) 06/03/2024    ALB 4.7 06/03/2024    TBILI 0.30 06/03/2024    CHOLESTEROL 156 03/14/2024    HDL 44 03/14/2024    TRIG 133 03/14/2024    LDLCALC 85 03/14/2024    NONHDLC 112 03/14/2024    LITHIUM 0.61 01/09/2024    DDP5LKVSPALA 1.062 11/15/2023    HGBA1C 5.0 04/01/2024    EAG 97 04/01/2024    CLOZAPINE 458 06/13/2024       Counseling / Coordination of Care  Total floor / unit time spent today 20 minutes. Greater than 50% of total time was spent with the patient and / or family counseling and / or coordination of care.

## 2024-06-14 NOTE — PLAN OF CARE
Problem: Ineffective Coping  Goal: Identifies ineffective coping skills  Outcome: Progressing  Goal: Identifies healthy coping skills  Outcome: Progressing  Goal: Demonstrates healthy coping skills  Outcome: Progressing  Goal: Participates in unit activities  Description: Interventions:  - Provide therapeutic environment   - Provide required programming   - Redirect inappropriate behaviors   Outcome: Progressing   Attended 14/19 groups offered during the past 2 days.

## 2024-06-14 NOTE — NURSING NOTE
Pt visible and social on the unit. Virtual at 8PM with sister no issues. Initially refused PM blood draws, however did allow nursing to draw after encouragement and explaining the importance. Visible and social with peers. Compliant with meds, Pt has auditory hallucinations.

## 2024-06-14 NOTE — PLAN OF CARE
Problem: Alteration in Thoughts and Perception  Goal: Treatment Goal: Gain control of psychotic behaviors/thinking, reduce/eliminate presenting symptoms and demonstrate improved reality functioning upon discharge  Outcome: Progressing  Goal: Verbalize thoughts and feelings  Description: Interventions:  - Promote a nonjudgmental and trusting relationship with the patient through active listening and therapeutic communication  - Assess patient's level of functioning, behavior and potential for risk  - Engage patient in 1 on 1 interactions  - Encourage patient to express fears, feelings, frustrations, and discuss symptoms    - [place] patient to reality, help patient recognize reality-based thinking   - Administer medications as ordered and assess for potential side effects  - Provide the patient education related to the signs and symptoms of the illness and desired effects of prescribed medications  Outcome: Progressing  Goal: Refrain from acting on delusional thinking/internal stimuli  Description: Interventions:  - Monitor patient closely, per order   - Utilize least restrictive measures   - Set reasonable limits, give positive feedback for acceptable   - Administer medications as ordered and monitor of potential side effects  Outcome: Progressing  Goal: Agree to be compliant with medication regime, as prescribed and report medication side effects  Description: Interventions:  - Offer appropriate PRN medication and supervise ingestion; conduct AIMS, as needed   Outcome: Progressing  Goal: Attend and participate in unit activities, including therapeutic, recreational, and educational groups  Description: Interventions:  -Encourage Visitation and family involvement in care  Outcome: Progressing  Goal: Complete daily ADLs, including personal hygiene independently, as able  Description: Interventions:  - Observe, teach, and assist patient with ADLS  - Monitor and promote a balance of rest/activity, with adequate  nutrition and elimination   Outcome: Progressing     Problem: Depression  Goal: Treatment Goal: Demonstrate behavioral control of depressive symptoms, verbalize feelings of improved mood/affect, and adopt new coping skills prior to discharge  Outcome: Progressing  Goal: Refrain from harming self  Description: Interventions:  - Monitor patient closely, per order   - Supervise medication ingestion, monitor effects and side effects   Outcome: Progressing  Goal: Refrain from isolation  Description: Interventions:  - Develop a trusting relationship   - Encourage socialization   Outcome: Progressing  Goal: Refrain from self-neglect  Outcome: Progressing  Goal: Attend and participate in unit activities, including therapeutic, recreational, and educational groups  Description: Interventions:  - Provide therapeutic and educational activities daily, encourage attendance and participation, and document same in the medical record   Outcome: Progressing  Goal: Complete daily ADLs, including personal hygiene independently, as able  Description: Interventions:  - Observe, teach, and assist patient with ADLS  -  Monitor and promote a balance of rest/activity, with adequate nutrition and elimination   Outcome: Progressing     Problem: Alteration in Orientation  Goal: Cooperate with recommended testing/procedures  Description: Interventions:  - Determine need for ancillary testing  - Observe for mental status changes  - Implement falls/precaution protocol   Outcome: Progressing  Goal: Attend and participate in unit activities, including therapeutic, recreational, and educational groups  Description: Interventions:  - Provide therapeutic and educational activities daily, encourage attendance and participation, and document same in the medical record   - Provide appropriate opportunities for reminiscence   - Provide a consistent daily routine   - Encourage family contact/visitation   Outcome: Progressing     Problem: DISCHARGE PLANNING  - CARE MANAGEMENT  Goal: Discharge to post-acute care or home with appropriate resources  Description: INTERVENTIONS:  - Conduct assessment to determine patient/family and health care team treatment goals, and need for post-acute services based on payer coverage, community resources, and patient preferences, and barriers to discharge  - Address psychosocial, clinical, and financial barriers to discharge as identified in assessment in conjunction with the patient/family and health care team  - Arrange appropriate level of post-acute services according to patient’s   needs and preference and payer coverage in collaboration with the physician and health care team  - Communicate with and update the patient/family, physician, and health care team regarding progress on the discharge plan  - Arrange appropriate transportation to post-acute venues  Outcome: Progressing

## 2024-06-15 PROCEDURE — 99232 SBSQ HOSP IP/OBS MODERATE 35: CPT | Performed by: PSYCHIATRY & NEUROLOGY

## 2024-06-15 RX ADMIN — NICOTINE POLACRILEX 2 MG: 2 GUM, CHEWING ORAL at 21:05

## 2024-06-15 RX ADMIN — LORATADINE 10 MG: 10 TABLET ORAL at 08:55

## 2024-06-15 RX ADMIN — METFORMIN HYDROCHLORIDE 500 MG: 500 TABLET, FILM COATED ORAL at 17:08

## 2024-06-15 RX ADMIN — ARIPIPRAZOLE 10 MG: 5 TABLET ORAL at 08:55

## 2024-06-15 RX ADMIN — GLYCOPYRROLATE 1 MG: 1 TABLET ORAL at 17:08

## 2024-06-15 RX ADMIN — GLYCOPYRROLATE 1 MG: 1 TABLET ORAL at 08:55

## 2024-06-15 RX ADMIN — ESCITALOPRAM OXALATE 20 MG: 10 TABLET, FILM COATED ORAL at 08:55

## 2024-06-15 RX ADMIN — CYANOCOBALAMIN TAB 1000 MCG 1000 MCG: 1000 TAB at 08:56

## 2024-06-15 RX ADMIN — POLYETHYLENE GLYCOL 3350 17 G: 17 POWDER, FOR SOLUTION ORAL at 08:58

## 2024-06-15 RX ADMIN — NICOTINE POLACRILEX 2 MG: 2 GUM, CHEWING ORAL at 08:56

## 2024-06-15 RX ADMIN — SENNOSIDES AND DOCUSATE SODIUM 2 TABLET: 8.6; 5 TABLET ORAL at 08:55

## 2024-06-15 RX ADMIN — FLUTICASONE PROPIONATE 1 SPRAY: 50 SPRAY, METERED NASAL at 08:58

## 2024-06-15 RX ADMIN — AMLODIPINE BESYLATE 5 MG: 5 TABLET ORAL at 08:55

## 2024-06-15 RX ADMIN — HYDROCHLOROTHIAZIDE 12.5 MG: 12.5 TABLET ORAL at 08:55

## 2024-06-15 RX ADMIN — CLOZAPINE 500 MG: 100 TABLET ORAL at 21:04

## 2024-06-15 RX ADMIN — SENNOSIDES AND DOCUSATE SODIUM 2 TABLET: 8.6; 5 TABLET ORAL at 17:08

## 2024-06-15 RX ADMIN — NICOTINE POLACRILEX 2 MG: 2 GUM, CHEWING ORAL at 17:09

## 2024-06-15 RX ADMIN — PROPRANOLOL HYDROCHLORIDE 10 MG: 10 TABLET ORAL at 08:56

## 2024-06-15 RX ADMIN — GLYCOPYRROLATE 1 MG: 1 TABLET ORAL at 21:05

## 2024-06-15 RX ADMIN — NICOTINE POLACRILEX 2 MG: 2 GUM, CHEWING ORAL at 12:56

## 2024-06-15 RX ADMIN — PROPRANOLOL HYDROCHLORIDE 10 MG: 10 TABLET ORAL at 21:05

## 2024-06-15 RX ADMIN — MELATONIN TAB 3 MG 3 MG: 3 TAB at 21:05

## 2024-06-15 RX ADMIN — METFORMIN HYDROCHLORIDE 500 MG: 500 TABLET, FILM COATED ORAL at 08:55

## 2024-06-15 NOTE — NURSING NOTE
Patient visible watching television with peers in the dining room.  Pleasant upon approach. Patient cooperative with taking his scheduled HS medication. Patient offered no complaints. Behaviors controlled and appropriate.   Patient did not request or require any PRN medications this evening.

## 2024-06-15 NOTE — PROGRESS NOTES
"Progress Note - Behavioral Health   Alberto Berumen 28 y.o. male MRN: [id number]  Unit/Bed#: [facility] 101-02 Encounter: [id number]    Alberto was seen for follow-up, chart reviewed and discussed with nursing staff.  Nursing staff notes he has been compliant with medications and treatment plan.  Continues with residual auditory hallucinations threatening to kill him and his family.  Staff notes he is visible in the milieu at times with select peers.  No physical aggression or agitation.  Sleeping and eating adequately.    Patient seen this morning lying in bed.  States that he continues with auditory hallucinations but a little less this morning.  Reports that he slept adequately last night.  Denies any suicidal or homicidal ideation.  Denies any visual hallucinations.  No adverse effects noted with his medications.  Physically states that he feels well.    Behavior over the last 24 hours:  unchanged  Sleep: normal  Appetite: normal  Medication side effects: No  ROS: no complaints  /68 (BP Location: Left arm)   Pulse 94   Temp 98.3 °F (36.8 °C) (Temporal)   Resp 20   Ht 5' 6\" (1.676 m)   Wt 118 kg (260 lb 6 oz)   SpO2 99%   BMI 42.03 kg/m²     Medications:   Current Facility-Administered Medications   Medication Dose Route Frequency    acetaminophen (TYLENOL) tablet 650 mg  650 mg Oral Q4H PRN    acetaminophen (TYLENOL) tablet 650 mg  650 mg Oral Q6H PRN    aluminum-magnesium hydroxide-simethicone (MAALOX) oral suspension 30 mL  30 mL Oral Q4H PRN    amLODIPine (NORVASC) tablet 5 mg  5 mg Oral Daily    ARIPiprazole (ABILIFY) tablet 10 mg  10 mg Oral Daily    Artificial Tears ophthalmic solution 1 drop  1 drop Both Eyes Q3H PRN    atropine (ISOPTO ATROPINE) 1 % ophthalmic solution 1 drop  1 drop Sublingual Daily PRN    haloperidol lactate (HALDOL) injection 2.5 mg  2.5 mg Intramuscular Q4H PRN Max 4/day    And    LORazepam (ATIVAN) injection 1 mg  1 mg Intramuscular Q4H PRN Max 4/day    And    benztropine " (COGENTIN) injection 0.5 mg  0.5 mg Intramuscular Q4H PRN Max 4/day    benztropine (COGENTIN) injection 1 mg  1 mg Intramuscular Q4H PRN Max 6/day    haloperidol lactate (HALDOL) injection 5 mg  5 mg Intramuscular Q4H PRN Max 4/day    And    LORazepam (ATIVAN) injection 2 mg  2 mg Intramuscular Q4H PRN Max 4/day    And    benztropine (COGENTIN) injection 1 mg  1 mg Intramuscular Q4H PRN Max 4/day    benztropine (COGENTIN) tablet 1 mg  1 mg Oral Q4H PRN Max 6/day    benztropine (COGENTIN) tablet 1 mg  1 mg Oral Q4H PRN Max 6/day    bisacodyl (DULCOLAX) rectal suppository 10 mg  10 mg Rectal Daily PRN    cloZAPine (CLOZARIL) tablet 500 mg  500 mg Oral HS    cyanocobalamin (VITAMIN B-12) tablet 1,000 mcg  1,000 mcg Oral Daily    hydrOXYzine HCL (ATARAX) tablet 50 mg  50 mg Oral Q6H PRN Max 4/day    Or    diphenhydrAMINE (BENADRYL) injection 50 mg  50 mg Intramuscular Q6H PRN    hydrOXYzine HCL (ATARAX) tablet 50 mg  50 mg Oral Q6H PRN Max 4/day    Or    diphenhydrAMINE (BENADRYL) injection 50 mg  50 mg Intramuscular Q6H PRN    diphenhydrAMINE-zinc acetate (BENADRYL) 2-0.1 % cream   Topical BID PRN    escitalopram (LEXAPRO) tablet 20 mg  20 mg Oral Daily    fluticasone (FLONASE) 50 mcg/act nasal spray 1 spray  1 spray Each Nare Daily    glycopyrrolate (ROBINUL) tablet 1 mg  1 mg Oral TID    haloperidol (HALDOL) tablet 1 mg  1 mg Oral Q6H PRN    haloperidol (HALDOL) tablet 2.5 mg  2.5 mg Oral Q4H PRN Max 4/day    haloperidol (HALDOL) tablet 5 mg  5 mg Oral Q4H PRN Max 4/day    hydroCHLOROthiazide tablet 12.5 mg  12.5 mg Oral Daily    hydrocortisone 1 % ointment   Topical 4x Daily PRN    hydrOXYzine HCL (ATARAX) tablet 100 mg  100 mg Oral Q6H PRN Max 4/day    Or    LORazepam (ATIVAN) injection 2 mg  2 mg Intramuscular Q6H PRN    hydrOXYzine HCL (ATARAX) tablet 100 mg  100 mg Oral Q6H PRN Max 4/day    Or    LORazepam (ATIVAN) injection 2 mg  2 mg Intramuscular Q6H PRN    hydrOXYzine HCL (ATARAX) tablet 25 mg  25 mg Oral  Q6H PRN Max 4/day    ibuprofen (MOTRIN) tablet 600 mg  600 mg Oral Q8H PRN    loratadine (CLARITIN) tablet 10 mg  10 mg Oral Daily    melatonin tablet 3 mg  3 mg Oral HS    metFORMIN (GLUCOPHAGE) tablet 500 mg  500 mg Oral BID With Meals    methocarbamol (ROBAXIN) tablet 500 mg  500 mg Oral Q6H PRN    nicotine polacrilex (NICORETTE) gum 2 mg  2 mg Oral Q4H PRN    OLANZapine (ZyPREXA) tablet 5 mg  5 mg Oral Q4H PRN Max 3/day    Or    OLANZapine (ZyPREXA) IM injection 2.5 mg  2.5 mg Intramuscular Q4H PRN Max 3/day    OLANZapine (ZyPREXA) tablet 5 mg  5 mg Oral Q3H PRN Max 3/day    Or    OLANZapine (ZyPREXA) IM injection 5 mg  5 mg Intramuscular Q3H PRN Max 3/day    OLANZapine (ZyPREXA) tablet 2.5 mg  2.5 mg Oral Q4H PRN Max 6/day    ondansetron (ZOFRAN-ODT) dispersible tablet 4 mg  4 mg Oral Q6H PRN    polyethylene glycol (MIRALAX) packet 17 g  17 g Oral Daily    polyethylene glycol (MIRALAX) packet 17 g  17 g Oral Daily PRN    propranolol (INDERAL) tablet 10 mg  10 mg Oral Q12H KAYLIE    senna-docusate sodium (SENOKOT S) 8.6-50 mg per tablet 1 tablet  1 tablet Oral Daily PRN    senna-docusate sodium (SENOKOT S) 8.6-50 mg per tablet 2 tablet  2 tablet Oral BID    traZODone (DESYREL) tablet 50 mg  50 mg Oral HS PRN    white petrolatum-mineral oil (EUCERIN,HYDROCERIN) cream   Topical TID PRN       Labs:   No results displayed because visit has over 200 results.          Mental Status Evaluation:  Appearance:  age appropriate, casually dressed, and fair eye contact   Behavior:  Calm, cooperative   Speech:  soft and scant   Mood:  Less depressed, anxious   Affect:  constricted and mood-congruent   Thought Process:  concrete and goal directed   Thought Content:     Associations: delusions  persecutory    [place]   Perceptual Disturbances: Auditory hallucinations without commands   Risk Potential: Suicidal Ideations none, Homicidal Ideations none, and Potential for Aggression No   Sensorium:  person, place, and time/date    Cognition:  recent and remote memory grossly intact   Consciousness:  alert and awake    Attention: attention span appeared shorter than expected for age   Insight:  limited   Judgment: limited   Gait/Station: normal gait/station   Motor Activity: no abnormal movements     Progress Toward Goals: Gradually progressing but continues with disruptive auditory hallucinations, receiving maintenance ECT and next treatment scheduled for June 21    Assessment & Plan   Principal Problem:    Schizoaffective disorder, bipolar type (HCC)  Active Problems:    GERD (gastroesophageal reflux disease)    Medical clearance for psychiatric admission    Tobacco abuse    T wave inversion in EKG    Chronic idiopathic constipation    Confluent and reticulate papillomatosis    Primary hypertension    Elevated hemoglobin A1c    Bilateral lower extremity edema      Recommended Treatment:   Clozapine 500 mg nightly  ANC 7.37 on 6/13/2024  Abilify 10 mg daily  Lexapro 20 mg daily  Melatonin 3 mg nightly  Propranolol 10 mg twice daily    Continue with group therapy, milieu therapy and occupational therapy.      Risks, benefits and possible side effects of Medications:   Risks, benefits, and possible side effects of medications explained to patient and patient verbalizes understanding.        Counseling / Coordination of Care  Total floor / unit time spent today 25 minutes. Greater than 50% of total time was spent with the patient and / or family counseling and / or coordination of care. A description of the counseling / coordination of care: Medication management, chart review, patient interview

## 2024-06-15 NOTE — NURSING NOTE
Alert, cooperative and visible intermittently. No SI or HI noted. Denies depression, anxiety and pain. Pt noted dry heaving 20mins after administration of medication.Dry heaving lasted 3mins. Pt states this has been happening the last 3 to 2 weeks after his medication. Pt refused anything for the dry heaving when asked. Did not attend any groups. Consumed 100% of breakfast and 75% of lunch. Took all medication without prompting. Maintained on safe precautions without incident. Will continue to monitor progress and recovery program.

## 2024-06-15 NOTE — PLAN OF CARE
Problem: Alteration in Thoughts and Perception  Goal: Agree to be compliant with medication regime, as prescribed and report medication side effects  Description: Interventions:  - Offer appropriate PRN medication and supervise ingestion; conduct AIMS, as needed   Outcome: Progressing  Goal: Attend and participate in unit activities, including therapeutic, recreational, and educational groups  Description: Interventions:  -Encourage Visitation and family involvement in care  Outcome: Progressing  Goal: Complete daily ADLs, including personal hygiene independently, as able  Description: Interventions:  - Observe, teach, and assist patient with ADLS  - Monitor and promote a balance of rest/activity, with adequate nutrition and elimination   Outcome: Progressing     Problem: Ineffective Coping  Goal: Participates in unit activities  Description: Interventions:  - Provide therapeutic environment   - Provide required programming   - Redirect inappropriate behaviors   Outcome: Progressing     Problem: Alteration in Thoughts and Perception  Goal: Refrain from acting on delusional thinking/internal stimuli  Description: Interventions:  - Monitor patient closely, per order   - Utilize least restrictive measures   - Set reasonable limits, give positive feedback for acceptable   - Administer medications as ordered and monitor of potential side effects  Outcome: Not Progressing

## 2024-06-16 VITALS
RESPIRATION RATE: 18 BRPM | DIASTOLIC BLOOD PRESSURE: 76 MMHG | SYSTOLIC BLOOD PRESSURE: 128 MMHG | OXYGEN SATURATION: 94 % | TEMPERATURE: 98.5 F | WEIGHT: 260.38 LBS | HEIGHT: 66 IN | HEART RATE: 97 BPM | BODY MASS INDEX: 41.85 KG/M2

## 2024-06-16 PROCEDURE — 99232 SBSQ HOSP IP/OBS MODERATE 35: CPT | Performed by: PSYCHIATRY & NEUROLOGY

## 2024-06-16 RX ADMIN — POLYETHYLENE GLYCOL 3350 17 G: 17 POWDER, FOR SOLUTION ORAL at 08:36

## 2024-06-16 RX ADMIN — CYANOCOBALAMIN TAB 1000 MCG 1000 MCG: 1000 TAB at 08:34

## 2024-06-16 RX ADMIN — LORATADINE 10 MG: 10 TABLET ORAL at 09:00

## 2024-06-16 RX ADMIN — ESCITALOPRAM OXALATE 20 MG: 10 TABLET, FILM COATED ORAL at 08:34

## 2024-06-16 RX ADMIN — METFORMIN HYDROCHLORIDE 500 MG: 500 TABLET, FILM COATED ORAL at 17:46

## 2024-06-16 RX ADMIN — MELATONIN TAB 3 MG 3 MG: 3 TAB at 21:20

## 2024-06-16 RX ADMIN — GLYCOPYRROLATE 1 MG: 1 TABLET ORAL at 17:45

## 2024-06-16 RX ADMIN — SENNOSIDES AND DOCUSATE SODIUM 2 TABLET: 8.6; 5 TABLET ORAL at 17:45

## 2024-06-16 RX ADMIN — CLOZAPINE 500 MG: 100 TABLET ORAL at 21:20

## 2024-06-16 RX ADMIN — GLYCOPYRROLATE 1 MG: 1 TABLET ORAL at 08:34

## 2024-06-16 RX ADMIN — AMLODIPINE BESYLATE 5 MG: 5 TABLET ORAL at 09:20

## 2024-06-16 RX ADMIN — NICOTINE POLACRILEX 2 MG: 2 GUM, CHEWING ORAL at 09:21

## 2024-06-16 RX ADMIN — METFORMIN HYDROCHLORIDE 500 MG: 500 TABLET, FILM COATED ORAL at 08:35

## 2024-06-16 RX ADMIN — PROPRANOLOL HYDROCHLORIDE 10 MG: 10 TABLET ORAL at 21:20

## 2024-06-16 RX ADMIN — ARIPIPRAZOLE 10 MG: 5 TABLET ORAL at 08:34

## 2024-06-16 RX ADMIN — NICOTINE POLACRILEX 2 MG: 2 GUM, CHEWING ORAL at 13:41

## 2024-06-16 RX ADMIN — PROPRANOLOL HYDROCHLORIDE 10 MG: 10 TABLET ORAL at 09:21

## 2024-06-16 RX ADMIN — SENNOSIDES AND DOCUSATE SODIUM 2 TABLET: 8.6; 5 TABLET ORAL at 08:34

## 2024-06-16 RX ADMIN — NICOTINE POLACRILEX 2 MG: 2 GUM, CHEWING ORAL at 17:52

## 2024-06-16 RX ADMIN — GLYCOPYRROLATE 1 MG: 1 TABLET ORAL at 21:20

## 2024-06-16 RX ADMIN — FLUTICASONE PROPIONATE 1 SPRAY: 50 SPRAY, METERED NASAL at 08:35

## 2024-06-16 NOTE — NURSING NOTE
Patient visible in the dining room all evening. Patient's behaviors were controlled and appropriate. Offered no complaints. Social with select peers.  Comes to staff to have his needs met. No prn medication requested or required.

## 2024-06-16 NOTE — PLAN OF CARE
Problem: Alteration in Thoughts and Perception  Goal: Verbalize thoughts and feelings  Description: Interventions:  - Promote a nonjudgmental and trusting relationship with the patient through active listening and therapeutic communication  - Assess patient's level of functioning, behavior and potential for risk  - Engage patient in 1 on 1 interactions  - Encourage patient to express fears, feelings, frustrations, and discuss symptoms    - [place] patient to reality, help patient recognize reality-based thinking   - Administer medications as ordered and assess for potential side effects  - Provide the patient education related to the signs and symptoms of the illness and desired effects of prescribed medications  Outcome: Progressing  Goal: Refrain from acting on delusional thinking/internal stimuli  Description: Interventions:  - Monitor patient closely, per order   - Utilize least restrictive measures   - Set reasonable limits, give positive feedback for acceptable   - Administer medications as ordered and monitor of potential side effects  Outcome: Progressing  Goal: Agree to be compliant with medication regime, as prescribed and report medication side effects  Description: Interventions:  - Offer appropriate PRN medication and supervise ingestion; conduct AIMS, as needed   6/16/2024 0641 by Nikki Redding RN  Outcome: Progressing  6/16/2024 0629 by Nikki Redding RN  Outcome: Progressing  Goal: Attend and participate in unit activities, including therapeutic, recreational, and educational groups  Description: Interventions:  -Encourage Visitation and family involvement in care  6/16/2024 0641 by Nikki Redding RN  Outcome: Progressing  6/16/2024 0629 by Nikki Redding RN  Outcome: Progressing  Goal: Recognize dysfunctional thoughts, communicate reality-based thoughts at the time of discharge  Description: Interventions:  - Provide medication and psycho-education to assist patient in compliance and developing  insight into his/her illness   Outcome: Progressing  Goal: Complete daily ADLs, including personal hygiene independently, as able  Description: Interventions:  - Observe, teach, and assist patient with ADLS  - Monitor and promote a balance of rest/activity, with adequate nutrition and elimination   Outcome: Progressing     Problem: Ineffective Coping  Goal: Identifies ineffective coping skills  Outcome: Progressing  Goal: Identifies healthy coping skills  Outcome: Progressing  Goal: Demonstrates healthy coping skills  Outcome: Progressing  Goal: Participates in unit activities  Description: Interventions:  - Provide therapeutic environment   - Provide required programming   - Redirect inappropriate behaviors   Outcome: Progressing  Goal: Patient/Family participate in treatment and DC plans  Description: Interventions:  - Provide therapeutic environment  Outcome: Progressing  Goal: Patient/Family verbalizes awareness of resources  6/16/2024 0641 by Nikki Redding RN  Outcome: Progressing  6/16/2024 0629 by Nikki Redding RN  Outcome: Progressing     Problem: Depression  Goal: Treatment Goal: Demonstrate behavioral control of depressive symptoms, verbalize feelings of improved mood/affect, and adopt new coping skills prior to discharge  6/16/2024 0641 by Nikki Redding RN  Outcome: Progressing  6/16/2024 0629 by Nikki Redding RN  Outcome: Progressing  Goal: Verbalize thoughts and feelings  Description: Interventions:  - Assess and re-assess patient's level of risk   - Engage patient in 1:1 interactions, daily, for a minimum of 15 minutes   - Encourage patient to express feelings, fears, frustrations, hopes   6/16/2024 0641 by Nikki Redding RN  Outcome: Progressing  6/16/2024 0629 by Nikki Redding RN  Outcome: Progressing  Goal: Refrain from harming self  Description: Interventions:  - Monitor patient closely, per order   - Supervise medication ingestion, monitor effects and side effects   6/16/2024 0641 by Nikki  MIGUEL Redding  Outcome: Progressing  6/16/2024 0629 by Nikki Redding RN  Outcome: Progressing  Goal: Refrain from isolation  Description: Interventions:  - Develop a trusting relationship   - Encourage socialization   6/16/2024 0641 by Nikki Redding RN  Outcome: Progressing  6/16/2024 0629 by Nikki Redding RN  Outcome: Progressing  Goal: Refrain from self-neglect  6/16/2024 0641 by Nikki Redding RN  Outcome: Progressing  6/16/2024 0629 by Nikki Redding RN  Outcome: Progressing  Goal: Attend and participate in unit activities, including therapeutic, recreational, and educational groups  Description: Interventions:  - Provide therapeutic and educational activities daily, encourage attendance and participation, and document same in the medical record   6/16/2024 0641 by Nikki Redding RN  Outcome: Progressing  6/16/2024 0629 by Nikki Redding RN  Outcome: Progressing  Goal: Complete daily ADLs, including personal hygiene independently, as able  Description: Interventions:  - Observe, teach, and assist patient with ADLS  -  Monitor and promote a balance of rest/activity, with adequate nutrition and elimination   Outcome: Progressing     Problem: Anxiety  Goal: Anxiety is at manageable level  Description: Interventions:  - Assess and monitor patient's anxiety level.   - Monitor for signs and symptoms (heart palpitations, chest pain, shortness of breath, headaches, nausea, feeling jumpy, restlessness, irritable, apprehensive).   - Collaborate with interdisciplinary team and initiate plan and interventions as ordered.  - [place] patient to unit/surroundings  - Explain treatment plan  - Encourage participation in care  - Encourage verbalization of concerns/fears  - Identify coping mechanisms  - Assist in developing anxiety-reducing skills  - Administer/offer alternative therapies  - Limit or eliminate stimulants  Outcome: Progressing     Problem: Alteration in Orientation  Goal: Treatment Goal: Demonstrate a reduction of  confusion and improved orientation to person, place, time and/or situation upon discharge, according to optimum baseline/ability  Outcome: Progressing  Goal: Interact with staff daily  Description: Interventions:  - Assess and re-assess patient's level of orientation  - Engage patient in 1 on 1 interactions, daily, for a minimum of 15 minutes   - Establish rapport/trust with patient   Outcome: Progressing  Goal: Express concerns related to confused thinking related to:  Description: Interventions:  - Encourage patient to express feelings, fears, frustrations, hopes  - Assign consistent caregivers   - [place]/re-orient patient as needed  - Allow comfort items, as appropriate  - Provide visual cues, signs, etc.   Outcome: Progressing  Goal: Allow medical examinations, as recommended  Description: Interventions:  - Provide physical/neurological exams and/or referrals, per provider   Outcome: Progressing  Goal: Cooperate with recommended testing/procedures  Description: Interventions:  - Determine need for ancillary testing  - Observe for mental status changes  - Implement falls/precaution protocol   Outcome: Progressing  Goal: Attend and participate in unit activities, including therapeutic, recreational, and educational groups  Description: Interventions:  - Provide therapeutic and educational activities daily, encourage attendance and participation, and document same in the medical record   - Provide appropriate opportunities for reminiscence   - Provide a consistent daily routine   - Encourage family contact/visitation   Outcome: Progressing  Goal: Complete daily ADLs, including personal hygiene independently, as able  Description: Interventions:  - Observe, teach, and assist patient with ADLS  Outcome: Progressing     Problem: Electroconvulsive therapy (ECT)  Goal: Treatment Goal: Demonstrate a reduction of confusion and improved orientation to person, place, time and/or situation upon discharge, according to  optimum baseline/ability  Outcome: Progressing  Goal: Verbalizes/displays adequate comfort level or baseline comfort level  Description: Interventions:  - Encourage patient to monitor pain and request assistance  - Assess pain using appropriate pain scale  - Administer analgesics based on type and severity of pain and evaluate response  - Implement non-pharmacological measures as appropriate and evaluate response  - Consider cultural and social influences on pain and pain management  - Notify physician/advanced practitioner if interventions unsuccessful or patient reports new pain  Outcome: Progressing  Goal: Achieves stable or improved neurological status  Description: INTERVENTIONS  - Monitor and report changes in neurological status  - Monitor vital signs such as temperature, blood pressure, glucose, and any other labs ordered   - Initiate measures to prevent increased intracranial pressure  - Monitor for seizure activity and implement precautions if appropriate      Outcome: Progressing  Goal: Achieves maximal functionality and self care  Description: INTERVENTIONS  - Monitor swallowing and airway patency with patient fatigue and changes in neurological status  - Encourage and assist patient to increase activity and self care.   - Encourage visually impaired, hearing impaired and aphasic patients to use assistive/communication devices  Outcome: Progressing  Goal: Maintain or return mobility to safest level of function  Description: INTERVENTIONS:  - Assess patient's ability to carry out ADLs; assess patient's baseline for ADL function and identify physical deficits which impact ability to perform ADLs (bathing, care of mouth/teeth, toileting, grooming, dressing, etc.)  - Assess/evaluate cause of self-care deficits   - Assess range of motion  - Assess patient's mobility  - Assess patient's need for assistive devices and provide as appropriate  - Encourage maximum independence but intervene and supervise when  necessary  - Involve family in performance of ADLs  - Assess for home care needs following discharge   - Consider OT consult to assist with ADL evaluation and planning for discharge  - Provide patient education as appropriate  Outcome: Progressing  Goal: Absence of urinary retention  Description: INTERVENTIONS:  - Assess patient’s ability to void and empty bladder  - Monitor I/O  - Bladder scan as needed  - Discuss with physician/AP medications to alleviate retention as needed  - Discuss catheterization for long term situations as appropriate  Outcome: Progressing  Goal: Minimal or absence of nausea and/or vomiting  Description: INTERVENTIONS:  - Administer IV fluids if ordered to ensure adequate hydration  - Maintain NPO status until nausea and vomiting are resolved  - Nasogastric tube if ordered  - Administer ordered antiemetic medications as needed  - Provide nonpharmacologic comfort measures as appropriate  - Advance diet as tolerated, if ordered  - Consider nutrition services referral to assist patient with adequate nutrition and appropriate food choices  Outcome: Progressing  Goal: Maintains adequate nutritional intake  Description: INTERVENTIONS:  - Monitor percentage of each meal consumed  - Identify factors contributing to decreased intake, treat as appropriate  - Assist with meals as needed  - Monitor I&O, weight, and lab values if indicated  - Obtain nutrition services referral as needed  Outcome: Progressing     Problem: DISCHARGE PLANNING - CARE MANAGEMENT  Goal: Discharge to post-acute care or home with appropriate resources  Description: INTERVENTIONS:  - Conduct assessment to determine patient/family and health care team treatment goals, and need for post-acute services based on payer coverage, community resources, and patient preferences, and barriers to discharge  - Address psychosocial, clinical, and financial barriers to discharge as identified in assessment in conjunction with the patient/family  and health care team  - Arrange appropriate level of post-acute services according to patient’s   needs and preference and payer coverage in collaboration with the physician and health care team  - Communicate with and update the patient/family, physician, and health care team regarding progress on the discharge plan  - Arrange appropriate transportation to post-acute venues  Outcome: Progressing     Problem: Alteration in Thoughts and Perception  Goal: Treatment Goal: Gain control of psychotic behaviors/thinking, reduce/eliminate presenting symptoms and demonstrate improved reality functioning upon discharge  6/16/2024 0641 by Nikki Redding RN  Outcome: Not Progressing  6/16/2024 0629 by Nikki Redding RN  Outcome: Not Progressing

## 2024-06-16 NOTE — PROGRESS NOTES
"Progress Note - Behavioral Health   Alberto Berumen 28 y.o. male MRN: [id number]  Unit/Bed#: [facility] 101-02 Encounter: [id number]    Alberto was seen for follow-up, chart reviewed and discussed with nursing staff.  Nursing staff notes he has had noted episodes of dry heaving in the morning after receiving his morning medications.  No noted emesis on has a good appetite.  Sleeping adequately at night.  Tends to be isolative to self and to room with intermittent visibility on the unit.  No aggression or agitation.  Compliant with medications.    Patient is calm and cooperative on approach.  Continues with residual auditory hallucinations threatening to kill him and his family.  Reports that they have been decreased since being in the hospital and on current medications.  Denies any visual hallucinations or command hallucinations.  States that he feels sad which is increased at times.  Denies suicidal or homicidal ideation.  Physically he notes that he has episodic dry heaving and states it is not just in the morning.  States that it does not occur every day though and does not want to change anything at this time.  Will continue to monitor.  His appetite is good and he is sleeping well.    Behavior over the last 24 hours:  unchanged  Sleep: normal  Appetite: normal  Medication side effects: No  ROS:  As noted in HPI  /91 (BP Location: Right arm)   Pulse (!) 113   Temp (!) 97.1 °F (36.2 °C) (Temporal)   Resp 18   Ht 5' 6\" (1.676 m)   Wt 118 kg (260 lb 6 oz)   SpO2 99%   BMI 42.03 kg/m²     Medications:   Current Facility-Administered Medications   Medication Dose Route Frequency    acetaminophen (TYLENOL) tablet 650 mg  650 mg Oral Q4H PRN    acetaminophen (TYLENOL) tablet 650 mg  650 mg Oral Q6H PRN    aluminum-magnesium hydroxide-simethicone (MAALOX) oral suspension 30 mL  30 mL Oral Q4H PRN    amLODIPine (NORVASC) tablet 5 mg  5 mg Oral Daily    ARIPiprazole (ABILIFY) tablet 10 mg  10 mg Oral Daily    " Artificial Tears ophthalmic solution 1 drop  1 drop Both Eyes Q3H PRN    atropine (ISOPTO ATROPINE) 1 % ophthalmic solution 1 drop  1 drop Sublingual Daily PRN    haloperidol lactate (HALDOL) injection 2.5 mg  2.5 mg Intramuscular Q4H PRN Max 4/day    And    LORazepam (ATIVAN) injection 1 mg  1 mg Intramuscular Q4H PRN Max 4/day    And    benztropine (COGENTIN) injection 0.5 mg  0.5 mg Intramuscular Q4H PRN Max 4/day    benztropine (COGENTIN) injection 1 mg  1 mg Intramuscular Q4H PRN Max 6/day    haloperidol lactate (HALDOL) injection 5 mg  5 mg Intramuscular Q4H PRN Max 4/day    And    LORazepam (ATIVAN) injection 2 mg  2 mg Intramuscular Q4H PRN Max 4/day    And    benztropine (COGENTIN) injection 1 mg  1 mg Intramuscular Q4H PRN Max 4/day    benztropine (COGENTIN) tablet 1 mg  1 mg Oral Q4H PRN Max 6/day    benztropine (COGENTIN) tablet 1 mg  1 mg Oral Q4H PRN Max 6/day    bisacodyl (DULCOLAX) rectal suppository 10 mg  10 mg Rectal Daily PRN    cloZAPine (CLOZARIL) tablet 500 mg  500 mg Oral HS    cyanocobalamin (VITAMIN B-12) tablet 1,000 mcg  1,000 mcg Oral Daily    hydrOXYzine HCL (ATARAX) tablet 50 mg  50 mg Oral Q6H PRN Max 4/day    Or    diphenhydrAMINE (BENADRYL) injection 50 mg  50 mg Intramuscular Q6H PRN    hydrOXYzine HCL (ATARAX) tablet 50 mg  50 mg Oral Q6H PRN Max 4/day    Or    diphenhydrAMINE (BENADRYL) injection 50 mg  50 mg Intramuscular Q6H PRN    diphenhydrAMINE-zinc acetate (BENADRYL) 2-0.1 % cream   Topical BID PRN    escitalopram (LEXAPRO) tablet 20 mg  20 mg Oral Daily    fluticasone (FLONASE) 50 mcg/act nasal spray 1 spray  1 spray Each Nare Daily    glycopyrrolate (ROBINUL) tablet 1 mg  1 mg Oral TID    haloperidol (HALDOL) tablet 1 mg  1 mg Oral Q6H PRN    haloperidol (HALDOL) tablet 2.5 mg  2.5 mg Oral Q4H PRN Max 4/day    haloperidol (HALDOL) tablet 5 mg  5 mg Oral Q4H PRN Max 4/day    hydroCHLOROthiazide tablet 12.5 mg  12.5 mg Oral Daily    hydrocortisone 1 % ointment   Topical 4x  Daily PRN    hydrOXYzine HCL (ATARAX) tablet 100 mg  100 mg Oral Q6H PRN Max 4/day    Or    LORazepam (ATIVAN) injection 2 mg  2 mg Intramuscular Q6H PRN    hydrOXYzine HCL (ATARAX) tablet 100 mg  100 mg Oral Q6H PRN Max 4/day    Or    LORazepam (ATIVAN) injection 2 mg  2 mg Intramuscular Q6H PRN    hydrOXYzine HCL (ATARAX) tablet 25 mg  25 mg Oral Q6H PRN Max 4/day    ibuprofen (MOTRIN) tablet 600 mg  600 mg Oral Q8H PRN    loratadine (CLARITIN) tablet 10 mg  10 mg Oral Daily    melatonin tablet 3 mg  3 mg Oral HS    metFORMIN (GLUCOPHAGE) tablet 500 mg  500 mg Oral BID With Meals    methocarbamol (ROBAXIN) tablet 500 mg  500 mg Oral Q6H PRN    nicotine polacrilex (NICORETTE) gum 2 mg  2 mg Oral Q4H PRN    OLANZapine (ZyPREXA) tablet 5 mg  5 mg Oral Q4H PRN Max 3/day    Or    OLANZapine (ZyPREXA) IM injection 2.5 mg  2.5 mg Intramuscular Q4H PRN Max 3/day    OLANZapine (ZyPREXA) tablet 5 mg  5 mg Oral Q3H PRN Max 3/day    Or    OLANZapine (ZyPREXA) IM injection 5 mg  5 mg Intramuscular Q3H PRN Max 3/day    OLANZapine (ZyPREXA) tablet 2.5 mg  2.5 mg Oral Q4H PRN Max 6/day    ondansetron (ZOFRAN-ODT) dispersible tablet 4 mg  4 mg Oral Q6H PRN    polyethylene glycol (MIRALAX) packet 17 g  17 g Oral Daily    polyethylene glycol (MIRALAX) packet 17 g  17 g Oral Daily PRN    propranolol (INDERAL) tablet 10 mg  10 mg Oral Q12H KAYLIE    senna-docusate sodium (SENOKOT S) 8.6-50 mg per tablet 1 tablet  1 tablet Oral Daily PRN    senna-docusate sodium (SENOKOT S) 8.6-50 mg per tablet 2 tablet  2 tablet Oral BID    traZODone (DESYREL) tablet 50 mg  50 mg Oral HS PRN    white petrolatum-mineral oil (EUCERIN,HYDROCERIN) cream   Topical TID PRN       Labs:   No results displayed because visit has over 200 results.          Mental Status Evaluation:  Appearance:  age appropriate, casually dressed, and fair eye contact   Behavior:  Calm, cooperative   Speech:  normal pitch and normal volume   Mood:  constricted and depressed    Affect:  mood-congruent   Thought Process:  goal directed   Thought Content:     Associations: delusions  persecutory    Intact   Perceptual Disturbances: Auditory hallucinations without commands   Risk Potential: Suicidal Ideations none, Homicidal Ideations none, and Potential for Aggression No   Sensorium:  person, place, and time/date   Cognition:  recent and remote memory grossly intact   Consciousness:  alert and awake    Attention: attention span appeared shorter than expected for age   Insight:  limited   Judgment: limited   Gait/Station: normal gait/station   Motor Activity: no abnormal movements     Progress Toward Goals: Gradually progressing but continues with residual auditory hallucinations that are disruptive, receiving maintenance ECT next treatment scheduled for June 21    Assessment & Plan   Principal Problem:    Schizoaffective disorder, bipolar type ([facility])  Active Problems:    GERD (gastroesophageal reflux disease)    Medical clearance for psychiatric admission    Tobacco abuse    T wave inversion in EKG    Chronic idiopathic constipation    Confluent and reticulate papillomatosis    Primary hypertension    Elevated hemoglobin A1c    Bilateral lower extremity edema      Recommended Treatment:   Clozapine 500 mg nightly  ANC 7.37 on 6/13/2024  Abilify 10 mg daily  Lexapro 20 mg daily  Melatonin 3 mg nightly  Propranolol 10 mg twice daily    Continue to monitor GI symptoms   At this time he states it is sporadic occurring at various times throughout the day and would not like to change times of medications or add anything at this time    Continue with group therapy, milieu therapy and occupational therapy.      Risks, benefits and possible side effects of Medications:   Risks, benefits, and possible side effects of medications explained to patient and patient verbalizes understanding.        Counseling / Coordination of Care  Total floor / unit time spent today 25 minutes. Greater than 50% of total  time was spent with the patient and / or family counseling and / or coordination of care. A description of the counseling / coordination of care: Medication management, chart review, patient interview

## 2024-06-16 NOTE — NURSING NOTE
Alert, cooperative and visible intermittently. Socializing with selective peers. No SI or HI noted.Pt states he has some depression, and has some anxiety. Pt states he has hallucinations they states they are going to kill him and his family. Denies pain. Attended exercise group, and coping skills. Consumed 100% of breakfast and 100% of lunch. Took all medication without prompting. Maintained on safe precautions without incident. Will continue to monitor progress and recovery program.

## 2024-06-17 PROCEDURE — 99232 SBSQ HOSP IP/OBS MODERATE 35: CPT | Performed by: PSYCHIATRY & NEUROLOGY

## 2024-06-17 RX ADMIN — GLYCOPYRROLATE 1 MG: 1 TABLET ORAL at 17:09

## 2024-06-17 RX ADMIN — METFORMIN HYDROCHLORIDE 500 MG: 500 TABLET, FILM COATED ORAL at 08:49

## 2024-06-17 RX ADMIN — PROPRANOLOL HYDROCHLORIDE 10 MG: 10 TABLET ORAL at 08:49

## 2024-06-17 RX ADMIN — NICOTINE POLACRILEX 2 MG: 2 GUM, CHEWING ORAL at 21:19

## 2024-06-17 RX ADMIN — GLYCOPYRROLATE 1 MG: 1 TABLET ORAL at 21:20

## 2024-06-17 RX ADMIN — MELATONIN TAB 3 MG 3 MG: 3 TAB at 21:20

## 2024-06-17 RX ADMIN — SENNOSIDES AND DOCUSATE SODIUM 2 TABLET: 8.6; 5 TABLET ORAL at 17:36

## 2024-06-17 RX ADMIN — FLUTICASONE PROPIONATE 1 SPRAY: 50 SPRAY, METERED NASAL at 08:51

## 2024-06-17 RX ADMIN — METFORMIN HYDROCHLORIDE 500 MG: 500 TABLET, FILM COATED ORAL at 17:07

## 2024-06-17 RX ADMIN — LORATADINE 10 MG: 10 TABLET ORAL at 08:49

## 2024-06-17 RX ADMIN — NICOTINE POLACRILEX 2 MG: 2 GUM, CHEWING ORAL at 17:10

## 2024-06-17 RX ADMIN — GLYCOPYRROLATE 1 MG: 1 TABLET ORAL at 08:49

## 2024-06-17 RX ADMIN — ARIPIPRAZOLE 10 MG: 5 TABLET ORAL at 08:48

## 2024-06-17 RX ADMIN — CLOZAPINE 500 MG: 100 TABLET ORAL at 21:20

## 2024-06-17 RX ADMIN — CYANOCOBALAMIN TAB 1000 MCG 1000 MCG: 1000 TAB at 08:48

## 2024-06-17 RX ADMIN — PROPRANOLOL HYDROCHLORIDE 10 MG: 10 TABLET ORAL at 21:20

## 2024-06-17 RX ADMIN — ESCITALOPRAM OXALATE 20 MG: 10 TABLET, FILM COATED ORAL at 08:49

## 2024-06-17 RX ADMIN — NICOTINE POLACRILEX 2 MG: 2 GUM, CHEWING ORAL at 09:18

## 2024-06-17 RX ADMIN — SENNOSIDES AND DOCUSATE SODIUM 2 TABLET: 8.6; 5 TABLET ORAL at 08:49

## 2024-06-17 NOTE — PLAN OF CARE
Problem: Ineffective Coping  Goal: Identifies ineffective coping skills  Outcome: Progressing  Goal: Identifies healthy coping skills  Outcome: Progressing  Goal: Demonstrates healthy coping skills  Outcome: Progressing  Goal: Participates in unit activities  Description: Interventions:  - Provide therapeutic environment   - Provide required programming   - Redirect inappropriate behaviors   Outcome: Progressing    Attended 41/47 groups offered last week.

## 2024-06-17 NOTE — NURSING NOTE
Patient Comes to staff to have his needs met. Visible with peers in the dining room and outside for fresh air as well. Cooperative and pleasant upon approach.   Offered no complaints. Behaviors controlled and appropriate. No prn medication requested or required.

## 2024-06-17 NOTE — PROGRESS NOTES
06/17/24 0757   Team Meeting   Meeting Type Daily Rounds   Team Members Present   Team Members Present Physician;Nurse;;Other (Discipline and Name)   Patient/Family Present   Patient Present No   Patient's Family Present No     In attendance:  MD Eveline Gamino, HOLLI Wang, MIGUEL Alvarado, [facility]  Carla Lux, [facility]  Irais Mcdowell, M.S.    Groups: 9/9    Pt had virtual visit that went well. Pt social, quiet, no bx issues noted. Next ECT 6/21.

## 2024-06-17 NOTE — NURSING NOTE
Patient visible in dayroom for meals and milieu at times,  interact with specific peers, cooperative with care, medication complaint, able to make needs known. Denied anxiety/depression. Denied SI/HI, AH/VH, safety precautions maintained.

## 2024-06-17 NOTE — PROGRESS NOTES
"Progress Note - Behavioral Health   Alberto Berumen 28 y.o. male MRN: [id number]  Unit/Bed#: [facility] 101-02 Encounter: [id number]    Assessment & Plan   Principal Problem:    Schizoaffective disorder, bipolar type ([facility])  Active Problems:    GERD (gastroesophageal reflux disease)    Medical clearance for psychiatric admission    Tobacco abuse    T wave inversion in EKG    Chronic idiopathic constipation    Confluent and reticulate papillomatosis    Primary hypertension    Elevated hemoglobin A1c    Bilateral lower extremity edema      Behavior over the last 24 hours:  unchanged  Sleep: normal  Appetite: normal  Medication side effects: Yes The patient has been experiencing drooling. This symptom was previously reported as improved by glycopyrrolate (Robinul). Now, the patient believes that since this weekend the drooling has been coming back.   ROS: no complaints    Subjective: The patient is a 28 year old male with a schizoaffective disorder bipolar type. The patient repots continued symptoms throughout the weekend and today of hearing voices that say they are going to kill him and his family. He states that he feels depressed and stressed, and that he had a stressful weekend. He additionally reports persecutory, paranoid delusions that someone is going to kill him and his family. He reports that he has people inside his head and that if he was not in the hospital he would not be safe. He reports that he feels somewhat safer being in the hospital. He reports being afraid of dying. He endorses experiencing suicidal ideation today and over the weekend, without plans. He reports the feelings to be more passive. He reports feelings of wishing he would not wake up, without any plans of harming himself. He denies homicidal thoughts. He rates his depression to be a 6.5/10 today, which has slightly improved since this weekend which he reported as ranging from a 7 or 8/10 in severity. He reports that he has been \"trying to " "stay out of his head\" but is having a difficult time.   Nursing reports that the patient was social and quiet over the weekend. They report that the patient often makes an odd sound clearing his throat that he was questioned about, but the patient reported that this was normal for him. He did not require any PRN medications over the weekend, and no behavioral problems. He attended 9/9 groups on Friday.     Mental Status Evaluation:  Appearance:  disheveled, older than stated age, overweight, and clean shaved. The patient is wearing hospital clothing with a black hooded sweatshirt over it. Makes appropriate eye contact.    Behavior:  Pleasant, slow in movement. Cooperative, occasionally slow in responses.    Speech:  normal pitch and normal volume. Normal rate   Mood:  Depressed, stressed   Affect:  flat, mood-congruent, and stable   Thought Process:  normal and logical   Associations: intact associations   Thought Content:  delusions  persecutory, paranoid delusions that someone is going to kill him and his family. States that if he was not in the hospital someone would kill him. He states that he has a fear of dying. The patient reports suicidal ideation without plans. The patient denies homicidal ideation. Denies the presence of grandiose, bizarre, or somatic delusions. Does not appear to be hypervigilant. Denies phobias, obsessions, or compulsions.    Perceptual Disturbances: Auditory hallucinations without commands that are stating they will kill the patient and his family. Denies any other hallucinations, illusions, personalization, or realization. Does not appear to be responding to internal stimuli.    Risk Potential: Suicidal Ideations without plan  Homicidal Ideations none  Potential for Aggression No   Sensorium:  person, place, time/date, and situation   Memory:  Immediate recall intact. Recent memory mildly impaired, able to recall world sporting events, able to state what he had for breakfast this " morning, able to recall 1/3 of the words from 5 minutes prior without assistance. Able to recall the other 2/3 of the words with multiple choice and assistance. Remote memory intact, able to recall the name of the middle school he attended.     Intelligence: Average intelligence. Average fund of knowledge, calculation, abstract interpretation of proverbs, vocabulary, and problem solving.    Impulse control: Does not exhibit poor impulse control   Consciousness:  alert and awake    Attention and concentration: attention span and concentration were age appropriate   Insight:  age appropriate and good   Judgment: age appropriate and good   Gait/Station: slow   Motor Activity: no abnormal movements     Progress Toward Goals: The patient reports continued auditory hallucinations that are voices threatening to kill him and his family. He states that the voices are the same severity as usual today. He rates his depression a 6.5/10 this morning, which has slightly improved from this weekend which he rated about a 7 or 8/10. The patient reports suicidal ideation without plans today and this weekend. He believes that his medication do not always help him, but sometimes do. His next ECT is scheduled for this Friday, 6/21.     Recommended Treatment: Continue with group therapy, milieu therapy and occupational therapy.      Risks, benefits and possible side effects of Medications:   Risks, benefits, and possible side effects of medications explained to patient and patient verbalizes understanding.      Medications: all current active meds have been reviewed.      Counseling / Coordination of Care  Total floor / unit time spent today 20 minutes. Greater than 50% of total time was spent with the patient and / or family counseling and / or coordination of care.

## 2024-06-18 PROCEDURE — 99232 SBSQ HOSP IP/OBS MODERATE 35: CPT | Performed by: PSYCHIATRY & NEUROLOGY

## 2024-06-18 RX ORDER — ARIPIPRAZOLE 15 MG/1
15 TABLET ORAL DAILY
Status: DISCONTINUED | OUTPATIENT
Start: 2024-06-19 | End: 2024-10-01

## 2024-06-18 RX ADMIN — NICOTINE POLACRILEX 2 MG: 2 GUM, CHEWING ORAL at 08:31

## 2024-06-18 RX ADMIN — MELATONIN TAB 3 MG 3 MG: 3 TAB at 21:14

## 2024-06-18 RX ADMIN — HYDROCHLOROTHIAZIDE 12.5 MG: 12.5 TABLET ORAL at 08:31

## 2024-06-18 RX ADMIN — GLYCOPYRROLATE 1 MG: 1 TABLET ORAL at 17:17

## 2024-06-18 RX ADMIN — POLYETHYLENE GLYCOL 3350 17 G: 17 POWDER, FOR SOLUTION ORAL at 08:31

## 2024-06-18 RX ADMIN — PROPRANOLOL HYDROCHLORIDE 10 MG: 10 TABLET ORAL at 21:14

## 2024-06-18 RX ADMIN — METFORMIN HYDROCHLORIDE 500 MG: 500 TABLET, FILM COATED ORAL at 08:32

## 2024-06-18 RX ADMIN — GLYCOPYRROLATE 1 MG: 1 TABLET ORAL at 21:14

## 2024-06-18 RX ADMIN — ARIPIPRAZOLE 10 MG: 5 TABLET ORAL at 08:32

## 2024-06-18 RX ADMIN — CYANOCOBALAMIN TAB 1000 MCG 1000 MCG: 1000 TAB at 08:31

## 2024-06-18 RX ADMIN — SENNOSIDES AND DOCUSATE SODIUM 2 TABLET: 8.6; 5 TABLET ORAL at 17:17

## 2024-06-18 RX ADMIN — PROPRANOLOL HYDROCHLORIDE 10 MG: 10 TABLET ORAL at 08:32

## 2024-06-18 RX ADMIN — GLYCOPYRROLATE 1 MG: 1 TABLET ORAL at 08:31

## 2024-06-18 RX ADMIN — LORATADINE 10 MG: 10 TABLET ORAL at 08:32

## 2024-06-18 RX ADMIN — ESCITALOPRAM OXALATE 20 MG: 10 TABLET, FILM COATED ORAL at 08:32

## 2024-06-18 RX ADMIN — METFORMIN HYDROCHLORIDE 500 MG: 500 TABLET, FILM COATED ORAL at 17:17

## 2024-06-18 RX ADMIN — NICOTINE POLACRILEX 2 MG: 2 GUM, CHEWING ORAL at 18:31

## 2024-06-18 RX ADMIN — NICOTINE POLACRILEX 2 MG: 2 GUM, CHEWING ORAL at 14:24

## 2024-06-18 RX ADMIN — CLOZAPINE 500 MG: 100 TABLET ORAL at 21:14

## 2024-06-18 RX ADMIN — SENNOSIDES AND DOCUSATE SODIUM 2 TABLET: 8.6; 5 TABLET ORAL at 08:31

## 2024-06-18 RX ADMIN — FLUTICASONE PROPIONATE 1 SPRAY: 50 SPRAY, METERED NASAL at 09:05

## 2024-06-18 NOTE — PROGRESS NOTES
06/18/24 0741   Team Meeting   Meeting Type Daily Rounds   Team Members Present   Team Members Present Physician;Nurse;;Other (Discipline and Name)   Patient/Family Present   Patient Present No   Patient's Family Present No     In attendance:  MD Eveline Gamino, HOLLI Travis, MIGUEL Alvarado, [facility]  Carla Lux, [facility]  MATILDA Daniel.S.    Groups: 6/9    Pt reports ongoing AH. Pt is visible and social with no noted bx issues.

## 2024-06-18 NOTE — NURSING NOTE
"Pt is accepting of medications without incidence and meal compliant. Morning amlodipine held for parameters. Pt is polite and pleasant in conversation. Social with select groups of peers. Laughing, making jokes with peers and select staff. Pt reports AH are \"the same\", but denies all other s/s. Pt is visible in the milieu sitting in the lounge and socializing. No new concerns.   "

## 2024-06-18 NOTE — NURSING NOTE
Alebrto reported to this nurse that he was hearing voices telling him that they will hurt him and his family.  Pt requested gum throughout the shift.  Visible and social.  Med and meal compliant.  No behavioral issues.

## 2024-06-18 NOTE — PLAN OF CARE
Problem: Alteration in Thoughts and Perception  Goal: Verbalize thoughts and feelings  Description: Interventions:  - Promote a nonjudgmental and trusting relationship with the patient through active listening and therapeutic communication  - Assess patient's level of functioning, behavior and potential for risk  - Engage patient in 1 on 1 interactions  - Encourage patient to express fears, feelings, frustrations, and discuss symptoms    - [place] patient to reality, help patient recognize reality-based thinking   - Administer medications as ordered and assess for potential side effects  - Provide the patient education related to the signs and symptoms of the illness and desired effects of prescribed medications  Outcome: Progressing  Goal: Agree to be compliant with medication regime, as prescribed and report medication side effects  Description: Interventions:  - Offer appropriate PRN medication and supervise ingestion; conduct AIMS, as needed   Outcome: Progressing  Goal: Attend and participate in unit activities, including therapeutic, recreational, and educational groups  Description: Interventions:  -Encourage Visitation and family involvement in care  Outcome: Progressing  Goal: Recognize dysfunctional thoughts, communicate reality-based thoughts at the time of discharge  Description: Interventions:  - Provide medication and psycho-education to assist patient in compliance and developing insight into his/her illness   Outcome: Progressing  Goal: Complete daily ADLs, including personal hygiene independently, as able  Description: Interventions:  - Observe, teach, and assist patient with ADLS  - Monitor and promote a balance of rest/activity, with adequate nutrition and elimination   Outcome: Progressing     Problem: Ineffective Coping  Goal: Demonstrates healthy coping skills  Outcome: Progressing  Goal: Participates in unit activities  Description: Interventions:  - Provide therapeutic environment   -  Provide required programming   - Redirect inappropriate behaviors   Outcome: Progressing     Problem: Depression  Goal: Treatment Goal: Demonstrate behavioral control of depressive symptoms, verbalize feelings of improved mood/affect, and adopt new coping skills prior to discharge  Outcome: Progressing  Goal: Refrain from harming self  Description: Interventions:  - Monitor patient closely, per order   - Supervise medication ingestion, monitor effects and side effects   Outcome: Progressing  Goal: Refrain from isolation  Description: Interventions:  - Develop a trusting relationship   - Encourage socialization   Outcome: Progressing  Goal: Refrain from self-neglect  Outcome: Progressing  Goal: Attend and participate in unit activities, including therapeutic, recreational, and educational groups  Description: Interventions:  - Provide therapeutic and educational activities daily, encourage attendance and participation, and document same in the medical record   Outcome: Progressing  Goal: Complete daily ADLs, including personal hygiene independently, as able  Description: Interventions:  - Observe, teach, and assist patient with ADLS  -  Monitor and promote a balance of rest/activity, with adequate nutrition and elimination   Outcome: Progressing     Problem: Anxiety  Goal: Anxiety is at manageable level  Description: Interventions:  - Assess and monitor patient's anxiety level.   - Monitor for signs and symptoms (heart palpitations, chest pain, shortness of breath, headaches, nausea, feeling jumpy, restlessness, irritable, apprehensive).   - Collaborate with interdisciplinary team and initiate plan and interventions as ordered.  - [place] patient to unit/surroundings  - Explain treatment plan  - Encourage participation in care  - Encourage verbalization of concerns/fears  - Identify coping mechanisms  - Assist in developing anxiety-reducing skills  - Administer/offer alternative therapies  - Limit or eliminate  stimulants  Outcome: Progressing     Problem: Electroconvulsive therapy (ECT)  Goal: Treatment Goal: Demonstrate a reduction of confusion and improved orientation to person, place, time and/or situation upon discharge, according to optimum baseline/ability  Outcome: Progressing  Goal: Achieves stable or improved neurological status  Description: INTERVENTIONS  - Monitor and report changes in neurological status  - Monitor vital signs such as temperature, blood pressure, glucose, and any other labs ordered   - Initiate measures to prevent increased intracranial pressure  - Monitor for seizure activity and implement precautions if appropriate      Outcome: Progressing  Goal: Maintain or return mobility to safest level of function  Description: INTERVENTIONS:  - Assess patient's ability to carry out ADLs; assess patient's baseline for ADL function and identify physical deficits which impact ability to perform ADLs (bathing, care of mouth/teeth, toileting, grooming, dressing, etc.)  - Assess/evaluate cause of self-care deficits   - Assess range of motion  - Assess patient's mobility  - Assess patient's need for assistive devices and provide as appropriate  - Encourage maximum independence but intervene and supervise when necessary  - Involve family in performance of ADLs  - Assess for home care needs following discharge   - Consider OT consult to assist with ADL evaluation and planning for discharge  - Provide patient education as appropriate  Outcome: Progressing  Goal: Maintains adequate nutritional intake  Description: INTERVENTIONS:  - Monitor percentage of each meal consumed  - Identify factors contributing to decreased intake, treat as appropriate  - Assist with meals as needed  - Monitor I&O, weight, and lab values if indicated  - Obtain nutrition services referral as needed  Outcome: Progressing     Problem: DISCHARGE PLANNING - CARE MANAGEMENT  Goal: Discharge to post-acute care or home with appropriate  resources  Description: INTERVENTIONS:  - Conduct assessment to determine patient/family and health care team treatment goals, and need for post-acute services based on payer coverage, community resources, and patient preferences, and barriers to discharge  - Address psychosocial, clinical, and financial barriers to discharge as identified in assessment in conjunction with the patient/family and health care team  - Arrange appropriate level of post-acute services according to patient’s   needs and preference and payer coverage in collaboration with the physician and health care team  - Communicate with and update the patient/family, physician, and health care team regarding progress on the discharge plan  - Arrange appropriate transportation to post-acute venues  Outcome: Progressing     Problem: Alteration in Thoughts and Perception  Goal: Treatment Goal: Gain control of psychotic behaviors/thinking, reduce/eliminate presenting symptoms and demonstrate improved reality functioning upon discharge  Outcome: Not Progressing  Goal: Refrain from acting on delusional thinking/internal stimuli  Description: Interventions:  - Monitor patient closely, per order   - Utilize least restrictive measures   - Set reasonable limits, give positive feedback for acceptable   - Administer medications as ordered and monitor of potential side effects  Outcome: Not Progressing

## 2024-06-18 NOTE — PROGRESS NOTES
Progress Note - Behavioral Health   Alberto Berumen 28 y.o. male MRN: [id number]  Unit/Bed#: [facility] 101-02 Encounter: [id number]    Assessment & Plan   Principal Problem:    Schizoaffective disorder, bipolar type ([facility])  Active Problems:    GERD (gastroesophageal reflux disease)    Medical clearance for psychiatric admission    Tobacco abuse    T wave inversion in EKG    Chronic idiopathic constipation    Confluent and reticulate papillomatosis    Primary hypertension    Elevated hemoglobin A1c    Bilateral lower extremity edema      Behavior over the last 24 hours:  unchanged  Sleep: normal  Appetite: normal  Medication side effects: Yes The patient is experiencing drooling. He states that it has been better since starting glycopyrrolate (Robinul) but has still not fully resolved.   ROS: no complaints    Subjective: The patient is a 28 year old male with schizoaffective disorder bipolar type. The patient reports that he continues to experience auditory hallucinations of voices that are saying they will kill him and his family. The patient reports that the voices are unchanged, and have not increased in severity. The patient also reports persecutory, paranoid delusions that someone is going to kill him and his family. He reports that he usually feels safe in the hospital, besides when his voices are worse than usual. He reported that he has suicidal ideation yesterday, that we passive. He denies any plans. He denies any thoughts of self harm and denies current suicidal or homicidal ideation today. He states he is afraid of dying. The patient continues to experience depression which he rates a 6.5/10 today, and is unchanged since yesterday.   Nursing reports that the patient has been visible, social, and reporting continued auditory hallucinations. Nursing additionally reports that the patient had an increased heart rate of 109 at 20:49, but is doing well. No changes in behavior are reported. The patient attended 6/9  groups yesterday and has his next ECT session scheduled from the 6/21.     Mental Status Evaluation:  Appearance:  disheveled, older than stated age, and overweight. Appropriate eye contact. Wearing a black hooded sweatshirt and cleanly shaved. Appropriate hygiene.    Behavior:  Cooperative, pleasant, slow in movements.    Speech:  normal pitch and normal volume   Mood:  Depressed   Affect:  flat, mood-congruent, and stable.   Thought Process:  normal and logical   Associations: intact associations   Thought Content:  delusions  persecutory, paranoid delusions. States that someone is going to kill him and his family. He reports being afraid of dying. Denies grandiose, bizarre, and somatic delusions. Denies phobias, obsessions, and compulsions.    Perceptual Disturbances: Auditory hallucinations without commands. Experiences voices that state they are going to kill him and his family. Denies all other hallucinations, illusions, personalization, and realization. Does not appear to be responding to internal stimuli.    Risk Potential: Suicidal Ideations none  Homicidal Ideations none  Potential for Aggression No   Sensorium:  person, place, time/date, and situation   Memory:  Immediate recall intact. Recent memory mildly impaired, able to recall current world events, able to report what he had for breakfast, able to recall 3 words from five minutes prior with assistance and multiple choice options. Remote memory intact.    Intelligence: Average intelligence, appropriate fund of knowledge, abstract interpretations and similarities, problem solving, and vocabulary.    Impulse control: No signs of poor impulse control.    Consciousness:  alert and awake    Attention and Concentration: attention span and concentration were age appropriate   Insight:  age appropriate and good   Judgment: age appropriate and good   Gait/Station: slow   Motor Activity: no abnormal movements     Progress Toward Goals: The patient reports  continued auditory hallucinations and persecutory delusions that include voices who are threatening to kill him and his family and thoughts that someone is going to kill him and his family. He reports continued depression that he rates a 6.5/10, that is unchanged from yesterday. He believes that his medications help him sometimes and that his ECTs are helpful. He feels safe being here, besides when the voices are very severe. He feels that being in the hospital is helping him. His next ECT is scheduled for 6/21.     Recommended Treatment: Continue with group therapy, milieu therapy and occupational therapy.      Risks, benefits and possible side effects of Medications:   Risks, benefits, and possible side effects of medications explained to patient and patient verbalizes understanding.      Medications: all current active meds have been reviewed.      Counseling / Coordination of Care  Total floor / unit time spent today 15 minutes. Greater than 50% of total time was spent with the patient and / or family counseling and / or coordination of care.

## 2024-06-18 NOTE — NURSING NOTE
"Pt is present on the milieu and social with select peers. He consumed 100 % of dinner. Took his medications without incidence. Nicorette gum given at 1831  Pt endorses anxiety and depression and \"the same\" AH which is the cause of his anxiety and depression. Pt is using his coping skills to cope with the voices. No behavioral issues.   "

## 2024-06-19 PROCEDURE — 99232 SBSQ HOSP IP/OBS MODERATE 35: CPT | Performed by: PSYCHIATRY & NEUROLOGY

## 2024-06-19 RX ADMIN — CYANOCOBALAMIN TAB 1000 MCG 1000 MCG: 1000 TAB at 08:29

## 2024-06-19 RX ADMIN — NICOTINE POLACRILEX 2 MG: 2 GUM, CHEWING ORAL at 08:29

## 2024-06-19 RX ADMIN — GLYCOPYRROLATE 1 MG: 1 TABLET ORAL at 08:29

## 2024-06-19 RX ADMIN — NICOTINE POLACRILEX 2 MG: 2 GUM, CHEWING ORAL at 21:06

## 2024-06-19 RX ADMIN — POLYETHYLENE GLYCOL 3350 17 G: 17 POWDER, FOR SOLUTION ORAL at 08:27

## 2024-06-19 RX ADMIN — SENNOSIDES AND DOCUSATE SODIUM 2 TABLET: 8.6; 5 TABLET ORAL at 17:00

## 2024-06-19 RX ADMIN — PROPRANOLOL HYDROCHLORIDE 10 MG: 10 TABLET ORAL at 21:06

## 2024-06-19 RX ADMIN — FLUTICASONE PROPIONATE 1 SPRAY: 50 SPRAY, METERED NASAL at 08:30

## 2024-06-19 RX ADMIN — LORATADINE 10 MG: 10 TABLET ORAL at 08:29

## 2024-06-19 RX ADMIN — PROPRANOLOL HYDROCHLORIDE 10 MG: 10 TABLET ORAL at 08:29

## 2024-06-19 RX ADMIN — ARIPIPRAZOLE 15 MG: 15 TABLET ORAL at 08:28

## 2024-06-19 RX ADMIN — ESCITALOPRAM OXALATE 20 MG: 10 TABLET, FILM COATED ORAL at 08:29

## 2024-06-19 RX ADMIN — NICOTINE POLACRILEX 2 MG: 2 GUM, CHEWING ORAL at 17:00

## 2024-06-19 RX ADMIN — SENNOSIDES AND DOCUSATE SODIUM 2 TABLET: 8.6; 5 TABLET ORAL at 08:29

## 2024-06-19 RX ADMIN — MELATONIN TAB 3 MG 3 MG: 3 TAB at 21:06

## 2024-06-19 RX ADMIN — NICOTINE POLACRILEX 2 MG: 2 GUM, CHEWING ORAL at 12:36

## 2024-06-19 RX ADMIN — GLYCOPYRROLATE 1 MG: 1 TABLET ORAL at 17:00

## 2024-06-19 RX ADMIN — AMLODIPINE BESYLATE 5 MG: 5 TABLET ORAL at 08:28

## 2024-06-19 RX ADMIN — HYDROCHLOROTHIAZIDE 12.5 MG: 12.5 TABLET ORAL at 08:28

## 2024-06-19 RX ADMIN — METFORMIN HYDROCHLORIDE 500 MG: 500 TABLET, FILM COATED ORAL at 17:00

## 2024-06-19 RX ADMIN — CLOZAPINE 500 MG: 100 TABLET ORAL at 21:06

## 2024-06-19 RX ADMIN — METFORMIN HYDROCHLORIDE 500 MG: 500 TABLET, FILM COATED ORAL at 08:29

## 2024-06-19 RX ADMIN — GLYCOPYRROLATE 1 MG: 1 TABLET ORAL at 21:06

## 2024-06-19 NOTE — NURSING NOTE
Pt is present on the milieu and social with select peers. He consumed 100 % of dinner. Took his medications without incidence. Nicorette gum given at 1700 and 2106. Pt is polite and cooperative. He remains with flat affect. Pt endorses anxiety and depression r/t the voices saying that they are going to kill his family, but he is able to cope with them. Attended groups. No behavioral issues.

## 2024-06-19 NOTE — PROGRESS NOTES
Progress Note - Behavioral Health   Alberto Berumen 28 y.o. male MRN: [id number]  Unit/Bed#: [facility] 101-02 Encounter: [id number]    Assessment & Plan   Principal Problem:    Schizoaffective disorder, bipolar type ([facility])  Active Problems:    GERD (gastroesophageal reflux disease)    Medical clearance for psychiatric admission    Tobacco abuse    T wave inversion in EKG    Chronic idiopathic constipation    Confluent and reticulate papillomatosis    Primary hypertension    Elevated hemoglobin A1c    Bilateral lower extremity edema      Behavior over the last 24 hours:  unchanged  Sleep: normal  Appetite: normal  Medication side effects: Yes The patient continues to experience drooling. He states that it has mildly improved since starting glycopyrrolate (Robinul) but has not fully subsided.   ROS: no complaints    Subjective: The patient is a 28 year old male with schizoaffective disorder, bipolar type. The patient reports that he is feeling depressed, rated a 6.5-7/10 today, compared to a 6.5/10 yesterday. He reports feeling this way due to the continued voices that he experiences. He states that the voices make him feel stressed and continue to say they are going to kill him and his family. The patient endorses persecutory, paranoid delusions that someone is going to harm him and his family. He reports that over the past day he has had suicidal ideation, including this morning. He reports that the suicidal ideation is more passive, such as not wanting to wake up in the morning, and denies any plans. He does not currently have suicidal ideation at the time of the interview. He denies any homicidal ideations or thoughts of self harm. The patient reports that he feels safe here sometimes, and that today he feels okay. He states that if he was not in the hospital he would be paranoid.  Nursing reports that the patient was seen as visible and joking with his peers yesterday and he was polite, pleasant, and cooperative. He  reported to nursing that he continues to experience auditory hallucinations. His amlodipine was held yesterday morning. At night, the patient reported anxiety and depression to nursing. The patient attended 10/11 groups yesterday.     Mental Status Evaluation:  Appearance:  disheveled, older than stated age, and overweight. Wearing hospital attire and a black hooded sweat shirt.    Behavior:  Cooperative, pleasant, quiet. Slow in movements. Leans his head against the wall next to him and closes his eyes frequently throughout the interview.    Speech:  normal pitch and quiet volume   Mood:  depressed   Affect:  flat, mood-congruent, and stable   Thought Process:  normal and logical   Associations: intact associations   Thought Content:  delusions  persecutory that someone is going to harm him and his family. Denies grandiose, somatic, and bizarre delusions. Denies phobias, obsessions, or compulsions.    Perceptual Disturbances: Auditory hallucinations without commands that state they are going to kill him and his family. Denies all other hallucinations, illusions, personalization, and realization. Does not appear to be responding to internal stimuli.    Risk Potential: Suicidal Ideations none  Homicidal Ideations none  Potential for Aggression No   Sensorium:  person, place, and time/date   Memory:  Immediate recall intact. Recent memory mildly impaired, able to remember 2/3 words from 5 minutes ago with multiple choice assistance, unable to remember the 3rd word. Able to recall world events.  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    Intelligence: Average intelligence. Appropriate fund of knowledge, calculations, abstract interpretations of proverbs and similarities, and  problem solving.    Impulse control: Does not exhibit poor impulse control.    Consciousness:  alert and awake    Attention and Concentration: Attention and concentration mildly impaired.  Able to recall the first 5 digits of a phone number, but unable to repeat the last digits. Able to complete the first and third round of serial 7s, requires assistance with the second round.    Insight:  age appropriate and good   Judgment: age appropriate and good   Gait/Station: slow   Motor Activity: no abnormal movements     Progress Toward Goals: The patient is continuing to experience auditory hallucinations that someone is going to kill him and his family, along with paranoid, persecutory delusions that someone is going to harm them. He reports his depression to be a 6.5-7/10, which has slightly increased from just a 6.5/10 from yesterday. He reports that he has experienced passive suicidal ideation today and yesterday, but did not have those thoughts at the time of the interview. He denies any plans. He believes that the ECTs are helping him and that the medications help him sometimes. His next ECT is scheduled for 6/21.     Recommended Treatment: Continue with group therapy, milieu therapy and occupational therapy.      Risks, benefits and possible side effects of Medications:   Risks, benefits, and possible side effects of medications explained to patient and patient verbalizes understanding.      Medications: all current active meds have been reviewed.      Counseling / Coordination of Care  Total floor / unit time spent today 15 minutes. Greater than 50% of total time was spent with the patient and / or family counseling and / or coordination of care.

## 2024-06-19 NOTE — PLAN OF CARE
Problem: Ineffective Coping  Goal: Identifies ineffective coping skills  Outcome: Progressing  Goal: Identifies healthy coping skills  Outcome: Progressing  Goal: Demonstrates healthy coping skills  Outcome: Progressing  Goal: Participates in unit activities  Description: Interventions:  - Provide therapeutic environment   - Provide required programming   - Redirect inappropriate behaviors   Outcome: Progressing   Attended 16/20 groups offered during the past 2 days.

## 2024-06-19 NOTE — SOCIAL WORK
SW placed call to pt's aunt Hanh to provide update and continue to discuss future options for discharge planning. SW left vm and requested a call back.

## 2024-06-19 NOTE — PROGRESS NOTES
06/19/24 0735   Team Meeting   Meeting Type Daily Rounds   Team Members Present   Team Members Present Physician;Nurse;;Other (Discipline and Name)   Patient/Family Present   Patient Present No   Patient's Family Present No     In attendance:  MD Eveline Gamino, HOLLI Travis, MIGUEL Alvarado, [facility]  Irais Mcdowell M.S.    Groups: 10/11    Pt reports ongoing auditory hallucinations that have not improved. Pt was social and joking with select peers yesterday, reported anxiety and depression related to voices in the evenings. No bx issues noted.

## 2024-06-19 NOTE — NURSING NOTE
"Pt is accepting of medications without incidence and meal compliant. Pt endorses AH as \"the usual\", depression and anxiety related to them, but denies all other s/s. Pt is visible and social with select peers. Attends select groups and makes needs known. No new concerns.   "

## 2024-06-19 NOTE — PLAN OF CARE
Problem: Alteration in Thoughts and Perception  Goal: Verbalize thoughts and feelings  Description: Interventions:  - Promote a nonjudgmental and trusting relationship with the patient through active listening and therapeutic communication  - Assess patient's level of functioning, behavior and potential for risk  - Engage patient in 1 on 1 interactions  - Encourage patient to express fears, feelings, frustrations, and discuss symptoms    - [place] patient to reality, help patient recognize reality-based thinking   - Administer medications as ordered and assess for potential side effects  - Provide the patient education related to the signs and symptoms of the illness and desired effects of prescribed medications  Outcome: Progressing  Goal: Refrain from acting on delusional thinking/internal stimuli  Description: Interventions:  - Monitor patient closely, per order   - Utilize least restrictive measures   - Set reasonable limits, give positive feedback for acceptable   - Administer medications as ordered and monitor of potential side effects  Outcome: Progressing  Goal: Agree to be compliant with medication regime, as prescribed and report medication side effects  Description: Interventions:  - Offer appropriate PRN medication and supervise ingestion; conduct AIMS, as needed   Outcome: Progressing  Goal: Attend and participate in unit activities, including therapeutic, recreational, and educational groups  Description: Interventions:  -Encourage Visitation and family involvement in care  Outcome: Progressing  Goal: Recognize dysfunctional thoughts, communicate reality-based thoughts at the time of discharge  Description: Interventions:  - Provide medication and psycho-education to assist patient in compliance and developing insight into his/her illness   Outcome: Progressing  Goal: Complete daily ADLs, including personal hygiene independently, as able  Description: Interventions:  - Observe, teach, and assist  patient with ADLS  - Monitor and promote a balance of rest/activity, with adequate nutrition and elimination   Outcome: Progressing     Problem: Ineffective Coping  Goal: Identifies ineffective coping skills  Outcome: Progressing  Goal: Identifies healthy coping skills  Outcome: Progressing  Goal: Demonstrates healthy coping skills  Outcome: Progressing  Goal: Participates in unit activities  Description: Interventions:  - Provide therapeutic environment   - Provide required programming   - Redirect inappropriate behaviors   Outcome: Progressing     Problem: Depression  Goal: Verbalize thoughts and feelings  Description: Interventions:  - Assess and re-assess patient's level of risk   - Engage patient in 1:1 interactions, daily, for a minimum of 15 minutes   - Encourage patient to express feelings, fears, frustrations, hopes   Outcome: Progressing  Goal: Refrain from harming self  Description: Interventions:  - Monitor patient closely, per order   - Supervise medication ingestion, monitor effects and side effects   Outcome: Progressing  Goal: Refrain from isolation  Description: Interventions:  - Develop a trusting relationship   - Encourage socialization   Outcome: Progressing  Goal: Refrain from self-neglect  Outcome: Progressing  Goal: Attend and participate in unit activities, including therapeutic, recreational, and educational groups  Description: Interventions:  - Provide therapeutic and educational activities daily, encourage attendance and participation, and document same in the medical record   Outcome: Progressing  Goal: Complete daily ADLs, including personal hygiene independently, as able  Description: Interventions:  - Observe, teach, and assist patient with ADLS  -  Monitor and promote a balance of rest/activity, with adequate nutrition and elimination   Outcome: Progressing     Problem: Anxiety  Goal: Anxiety is at manageable level  Description: Interventions:  - Assess and monitor patient's anxiety  level.   - Monitor for signs and symptoms (heart palpitations, chest pain, shortness of breath, headaches, nausea, feeling jumpy, restlessness, irritable, apprehensive).   - Collaborate with interdisciplinary team and initiate plan and interventions as ordered.  - [place] patient to unit/surroundings  - Explain treatment plan  - Encourage participation in care  - Encourage verbalization of concerns/fears  - Identify coping mechanisms  - Assist in developing anxiety-reducing skills  - Administer/offer alternative therapies  - Limit or eliminate stimulants  Outcome: Progressing     Problem: Alteration in Thoughts and Perception  Goal: Treatment Goal: Gain control of psychotic behaviors/thinking, reduce/eliminate presenting symptoms and demonstrate improved reality functioning upon discharge  Outcome: Not Progressing     Problem: Depression  Goal: Treatment Goal: Demonstrate behavioral control of depressive symptoms, verbalize feelings of improved mood/affect, and adopt new coping skills prior to discharge  Outcome: Not Progressing

## 2024-06-19 NOTE — SOCIAL WORK
SW checked in with pt. Pt reports feeling fine and denied present concerns. Pt reports voices are still present but he's dealing with them. Pt continues to present with a depressed, flat affect. Pt was observed interacting appropriately with female peer on the unit.

## 2024-06-20 ENCOUNTER — ANESTHESIA EVENT (OUTPATIENT)
Dept: ANESTHESIOLOGY | Facility: HOSPITAL | Age: 29
End: 2024-06-20

## 2024-06-20 ENCOUNTER — ANESTHESIA (OUTPATIENT)
Dept: ANESTHESIOLOGY | Facility: HOSPITAL | Age: 29
End: 2024-06-20

## 2024-06-20 LAB
BASOPHILS # BLD AUTO: 0.07 THOUSANDS/ÂΜL (ref 0–0.1)
BASOPHILS NFR BLD AUTO: 1 % (ref 0–1)
EOSINOPHIL # BLD AUTO: 0.49 THOUSAND/ÂΜL (ref 0–0.61)
EOSINOPHIL NFR BLD AUTO: 4 % (ref 0–6)
ERYTHROCYTE [DISTWIDTH] IN BLOOD BY AUTOMATED COUNT: 14.4 % (ref 11.6–15.1)
HCT VFR BLD AUTO: 42.1 % (ref 36.5–49.3)
HGB BLD-MCNC: 12.5 G/DL (ref 12–17)
IMM GRANULOCYTES # BLD AUTO: 0.06 THOUSAND/UL (ref 0–0.2)
IMM GRANULOCYTES NFR BLD AUTO: 1 % (ref 0–2)
LYMPHOCYTES # BLD AUTO: 4.38 THOUSANDS/ÂΜL (ref 0.6–4.47)
LYMPHOCYTES NFR BLD AUTO: 36 % (ref 14–44)
MCH RBC QN AUTO: 23.6 PG (ref 26.8–34.3)
MCHC RBC AUTO-ENTMCNC: 29.7 G/DL (ref 31.4–37.4)
MCV RBC AUTO: 80 FL (ref 82–98)
MONOCYTES # BLD AUTO: 1.12 THOUSAND/ÂΜL (ref 0.17–1.22)
MONOCYTES NFR BLD AUTO: 9 % (ref 4–12)
NEUTROPHILS # BLD AUTO: 6.14 THOUSANDS/ÂΜL (ref 1.85–7.62)
NEUTS SEG NFR BLD AUTO: 49 % (ref 43–75)
NRBC BLD AUTO-RTO: 0 /100 WBCS
PLATELET # BLD AUTO: 269 THOUSANDS/UL (ref 149–390)
PMV BLD AUTO: 10.5 FL (ref 8.9–12.7)
RBC # BLD AUTO: 5.29 MILLION/UL (ref 3.88–5.62)
WBC # BLD AUTO: 12.26 THOUSAND/UL (ref 4.31–10.16)

## 2024-06-20 PROCEDURE — 85025 COMPLETE CBC W/AUTO DIFF WBC: CPT | Performed by: PSYCHIATRY & NEUROLOGY

## 2024-06-20 PROCEDURE — 99232 SBSQ HOSP IP/OBS MODERATE 35: CPT | Performed by: PSYCHIATRY & NEUROLOGY

## 2024-06-20 RX ADMIN — HYDROCHLOROTHIAZIDE 12.5 MG: 12.5 TABLET ORAL at 08:37

## 2024-06-20 RX ADMIN — MELATONIN TAB 3 MG 3 MG: 3 TAB at 21:17

## 2024-06-20 RX ADMIN — NICOTINE POLACRILEX 2 MG: 2 GUM, CHEWING ORAL at 17:06

## 2024-06-20 RX ADMIN — SENNOSIDES AND DOCUSATE SODIUM 2 TABLET: 8.6; 5 TABLET ORAL at 17:29

## 2024-06-20 RX ADMIN — METFORMIN HYDROCHLORIDE 500 MG: 500 TABLET, FILM COATED ORAL at 08:37

## 2024-06-20 RX ADMIN — PROPRANOLOL HYDROCHLORIDE 10 MG: 10 TABLET ORAL at 08:37

## 2024-06-20 RX ADMIN — CLOZAPINE 500 MG: 100 TABLET ORAL at 21:17

## 2024-06-20 RX ADMIN — GLYCOPYRROLATE 1 MG: 1 TABLET ORAL at 08:37

## 2024-06-20 RX ADMIN — NICOTINE POLACRILEX 2 MG: 2 GUM, CHEWING ORAL at 13:06

## 2024-06-20 RX ADMIN — LORATADINE 10 MG: 10 TABLET ORAL at 08:37

## 2024-06-20 RX ADMIN — ARIPIPRAZOLE 15 MG: 15 TABLET ORAL at 08:37

## 2024-06-20 RX ADMIN — CYANOCOBALAMIN TAB 1000 MCG 1000 MCG: 1000 TAB at 08:37

## 2024-06-20 RX ADMIN — GLYCOPYRROLATE 1 MG: 1 TABLET ORAL at 21:17

## 2024-06-20 RX ADMIN — GLYCOPYRROLATE 1 MG: 1 TABLET ORAL at 17:29

## 2024-06-20 RX ADMIN — PROPRANOLOL HYDROCHLORIDE 10 MG: 10 TABLET ORAL at 21:17

## 2024-06-20 RX ADMIN — NICOTINE POLACRILEX 2 MG: 2 GUM, CHEWING ORAL at 21:17

## 2024-06-20 RX ADMIN — POLYETHYLENE GLYCOL 3350 17 G: 17 POWDER, FOR SOLUTION ORAL at 08:37

## 2024-06-20 RX ADMIN — ESCITALOPRAM OXALATE 20 MG: 10 TABLET, FILM COATED ORAL at 08:37

## 2024-06-20 RX ADMIN — AMLODIPINE BESYLATE 5 MG: 5 TABLET ORAL at 08:37

## 2024-06-20 RX ADMIN — FLUTICASONE PROPIONATE 1 SPRAY: 50 SPRAY, METERED NASAL at 08:37

## 2024-06-20 RX ADMIN — METFORMIN HYDROCHLORIDE 500 MG: 500 TABLET, FILM COATED ORAL at 17:29

## 2024-06-20 RX ADMIN — SENNOSIDES AND DOCUSATE SODIUM 2 TABLET: 8.6; 5 TABLET ORAL at 08:37

## 2024-06-20 RX ADMIN — NICOTINE POLACRILEX 2 MG: 2 GUM, CHEWING ORAL at 08:37

## 2024-06-20 NOTE — SOCIAL WORK
SW observed pt in hospital attire walking with peers and checked in. Pt reported no concerns or issues.

## 2024-06-20 NOTE — NURSING NOTE
Pt denies SI/HI/AH/VH but at times appears internally preoccupied. Present in dayroom and milieu. Medication and meal compliant. Social with peers. Scant with communication. Pt compliant with Lunch. No further concerns a of present. Plan of care ongoing.

## 2024-06-20 NOTE — PROGRESS NOTES
06/20/24 0736   Team Meeting   Meeting Type Daily Rounds   Team Members Present   Team Members Present Physician;Nurse;;Other (Discipline and Name)   Patient/Family Present   Patient Present No   Patient's Family Present No     In attendance:  MD Eveline Gamino, HOLLI Lucas, MIGUEL Alvarado, [facility]  Carla Lux, [facility]  Irais Mcdowell, M.S.    Groups: 7/9      Pt reports ongoing auditory hallucinations with no improvement. Pt social with select peers. No bx issues noted. Pt has ECT tomorrow.

## 2024-06-20 NOTE — ANESTHESIA PREPROCEDURE EVALUATION
Procedure:  PRE-OP ONLY    ECG: NSR with possible inferior infarct and lateral ischemia     Relevant Problems   CARDIO   (+) Primary hypertension      GI/HEPATIC   (+) GERD (gastroesophageal reflux disease)             Anesthesia Plan  ASA Score- 3     Anesthesia Type- general with ASA Monitors.         Additional Monitors:     Airway Plan:            Plan Factors-    Chart reviewed. EKG reviewed.  Existing labs reviewed. Patient summary reviewed.                  Induction-     Postoperative Plan-     Perioperative Resuscitation Plan - Level 1 - Full Code.       Informed Consent-

## 2024-06-20 NOTE — NURSING NOTE
"Pt is present on the milieu and social with select peers. He consumed 100 % of dinner. Took his medications without incidence. Pt is polite and cooperative. Pt states \"I'm much better than yesterday\". Appears brighter on approach. Pt endorses AH, but they are tolerable. Attended groups and played cards with peers. Pt offered no complaints. No behavioral issues.   "

## 2024-06-20 NOTE — PLAN OF CARE
Problem: Alteration in Thoughts and Perception  Goal: Verbalize thoughts and feelings  Description: Interventions:  - Promote a nonjudgmental and trusting relationship with the patient through active listening and therapeutic communication  - Assess patient's level of functioning, behavior and potential for risk  - Engage patient in 1 on 1 interactions  - Encourage patient to express fears, feelings, frustrations, and discuss symptoms    - [place] patient to reality, help patient recognize reality-based thinking   - Administer medications as ordered and assess for potential side effects  - Provide the patient education related to the signs and symptoms of the illness and desired effects of prescribed medications  Outcome: Progressing  Goal: Refrain from acting on delusional thinking/internal stimuli  Description: Interventions:  - Monitor patient closely, per order   - Utilize least restrictive measures   - Set reasonable limits, give positive feedback for acceptable   - Administer medications as ordered and monitor of potential side effects  Outcome: Progressing  Goal: Agree to be compliant with medication regime, as prescribed and report medication side effects  Description: Interventions:  - Offer appropriate PRN medication and supervise ingestion; conduct AIMS, as needed   Outcome: Progressing  Goal: Attend and participate in unit activities, including therapeutic, recreational, and educational groups  Description: Interventions:  -Encourage Visitation and family involvement in care  Outcome: Progressing  Goal: Recognize dysfunctional thoughts, communicate reality-based thoughts at the time of discharge  Description: Interventions:  - Provide medication and psycho-education to assist patient in compliance and developing insight into his/her illness   Outcome: Progressing  Goal: Complete daily ADLs, including personal hygiene independently, as able  Description: Interventions:  - Observe, teach, and assist  patient with ADLS  - Monitor and promote a balance of rest/activity, with adequate nutrition and elimination   Outcome: Progressing     Problem: Ineffective Coping  Goal: Identifies ineffective coping skills  Outcome: Progressing  Goal: Identifies healthy coping skills  Outcome: Progressing  Goal: Demonstrates healthy coping skills  Outcome: Progressing  Goal: Participates in unit activities  Description: Interventions:  - Provide therapeutic environment   - Provide required programming   - Redirect inappropriate behaviors   Outcome: Progressing     Problem: Depression  Goal: Verbalize thoughts and feelings  Description: Interventions:  - Assess and re-assess patient's level of risk   - Engage patient in 1:1 interactions, daily, for a minimum of 15 minutes   - Encourage patient to express feelings, fears, frustrations, hopes   Outcome: Progressing  Goal: Refrain from harming self  Description: Interventions:  - Monitor patient closely, per order   - Supervise medication ingestion, monitor effects and side effects   Outcome: Progressing  Goal: Refrain from isolation  Description: Interventions:  - Develop a trusting relationship   - Encourage socialization   Outcome: Progressing  Goal: Refrain from self-neglect  Outcome: Progressing  Goal: Attend and participate in unit activities, including therapeutic, recreational, and educational groups  Description: Interventions:  - Provide therapeutic and educational activities daily, encourage attendance and participation, and document same in the medical record   Outcome: Progressing  Goal: Complete daily ADLs, including personal hygiene independently, as able  Description: Interventions:  - Observe, teach, and assist patient with ADLS  -  Monitor and promote a balance of rest/activity, with adequate nutrition and elimination   Outcome: Progressing     Problem: Anxiety  Goal: Anxiety is at manageable level  Description: Interventions:  - Assess and monitor patient's anxiety  level.   - Monitor for signs and symptoms (heart palpitations, chest pain, shortness of breath, headaches, nausea, feeling jumpy, restlessness, irritable, apprehensive).   - Collaborate with interdisciplinary team and initiate plan and interventions as ordered.  - [place] patient to unit/surroundings  - Explain treatment plan  - Encourage participation in care  - Encourage verbalization of concerns/fears  - Identify coping mechanisms  - Assist in developing anxiety-reducing skills  - Administer/offer alternative therapies  - Limit or eliminate stimulants  Outcome: Progressing     Problem: Alteration in Thoughts and Perception  Goal: Treatment Goal: Gain control of psychotic behaviors/thinking, reduce/eliminate presenting symptoms and demonstrate improved reality functioning upon discharge  Outcome: Not Progressing     Problem: Depression  Goal: Treatment Goal: Demonstrate behavioral control of depressive symptoms, verbalize feelings of improved mood/affect, and adopt new coping skills prior to discharge  Outcome: Not Progressing

## 2024-06-20 NOTE — PROGRESS NOTES
"Progress Note - Behavioral Health   Alberto Berumen 28 y.o. male MRN: [id number]  Unit/Bed#: [facility] 101-02 Encounter: [id number]    Assessment & Plan   Principal Problem:    Schizoaffective disorder, bipolar type ([facility])  Active Problems:    GERD (gastroesophageal reflux disease)    Medical clearance for psychiatric admission    Tobacco abuse    T wave inversion in EKG    Chronic idiopathic constipation    Confluent and reticulate papillomatosis    Primary hypertension    Elevated hemoglobin A1c    Bilateral lower extremity edema      Behavior over the last 24 hours:  unchanged  Sleep: normal  Appetite: normal  Medication side effects: Yes The patient experiences drooling. He states that this side effect has improved since starting glycopyrrolate (Robinul), but it is still present.   ROS: no complaints    Subjective: The patient is a 28 year old male with schizoaffective disorder, bipolar type. The patient reports that he had a \"horrible\" day yesterday. He reports continued auditory hallucinations of voices that are saying they will kill him and his family. He states that yesterday these voices were louder and that there were more voices than usual. He reports that the content of the hallucinations have remained the same. The patient states that this morning the voices are slightly less severe. He endorses paranoid, persecutory delusions that someone is going to kill him and his family. He reports that he is afraid of dying. The patient states that yesterday his depression was rated a 9/10, which has now improved this morning to a 7/10 in severity. He reports that he experienced passive suicidal ideation without plans yesterday. He denies suicidal ideation today and denies homicidal ideation. The patient states that sometimes he feels safe in the unit, but other times his symptoms are worse. He states that he feels safer in the hospital than outside of the hospital.   The patient reported anxiety, depression, and " continued auditory hallucinations to nursing yesterday evening. Nursing reports that the patient was social with peers yesterday, pleasant, and polite. He slept well and had no behavioral concerns. His next ECT is scheduled for 6/21. He attended 7/9 groups.     Mental Status Evaluation:  Appearance:  disheveled, older than stated age, and overweight. The patient is wearing hospital clothes with a black sweatshirt with his restrepo on. Appropriate eye contact.   Behavior:  Pleasant, cooperative. Slow in movements.   Speech:  normal pitch and normal volume   Mood:  Depressed,  tired   Affect:  flat, mood-congruent, and stable.    Thought Process:  normal and logical   Associations: intact associations   Thought Content:  delusions  persecutory, paranoid delusions that someone is going to harm him and his family. Denies any other experiences that could be categorized as delusions, phobias, obsessions, or compulsions.    Perceptual Disturbances: Auditory hallucinations without commands. Describes voices that are saying they will kill him and his family. Denies any other hallucinations or illusions.    Risk Potential: Suicidal Ideations none  Homicidal Ideations none  Potential for Aggression No   Sensorium:  person, place, time/date, and situation   Memory:  recent and remote memory grossly intact   Intelligence: Average intelligence   Impulse control: Does not exhibit signs of poor impulse control   Consciousness:  alert and awake    Attention and concentration: attention span and concentration were age appropriate   Insight:  age appropriate and good   Judgment: age appropriate and good   Gait/Station: slow   Motor Activity: no abnormal movements     Progress Toward Goals: The patient reports continued auditory hallucinations and paranoid delusions that someone is going to kill him and his family. He reports that his depression yesterday was at a 9/10, and is now a 7/10 this morning. The patient reports passive suicidal  ideation yesterday without plans yesterday, and states that he no longer was experiencing these thoughts at the time of the interview. The patient states that he feels safer in the hospital than he would outside of the hospital at this time. He sometimes feels safe here, but other times he says that the voices are very bad. The patient believes that his medications are sometimes helping him and that his ECTs do help. His next ECT is scheduled for 6/21.     Recommended Treatment: Continue with group therapy, milieu therapy and occupational therapy.      Risks, benefits and possible side effects of Medications:   Risks, benefits, and possible side effects of medications explained to patient and patient verbalizes understanding.      Medications: all current active meds have been reviewed.    Counseling / Coordination of Care  Total floor / unit time spent today 10 minutes. Greater than 50% of total time was spent with the patient and / or family counseling and / or coordination of care.

## 2024-06-21 ENCOUNTER — ANESTHESIA (INPATIENT)
Dept: PREOP/PACU | Facility: HOSPITAL | Age: 29
DRG: 750 | End: 2024-06-21
Payer: COMMERCIAL

## 2024-06-21 ENCOUNTER — ANESTHESIA EVENT (INPATIENT)
Dept: PREOP/PACU | Facility: HOSPITAL | Age: 29
DRG: 750 | End: 2024-06-21
Payer: COMMERCIAL

## 2024-06-21 ENCOUNTER — APPOINTMENT (INPATIENT)
Dept: PREOP/PACU | Facility: HOSPITAL | Age: 29
DRG: 750 | End: 2024-06-21
Attending: PSYCHIATRY & NEUROLOGY
Payer: COMMERCIAL

## 2024-06-21 PROCEDURE — 99232 SBSQ HOSP IP/OBS MODERATE 35: CPT

## 2024-06-21 PROCEDURE — 90870 ELECTROCONVULSIVE THERAPY: CPT | Performed by: STUDENT IN AN ORGANIZED HEALTH CARE EDUCATION/TRAINING PROGRAM

## 2024-06-21 PROCEDURE — 90870 ELECTROCONVULSIVE THERAPY: CPT

## 2024-06-21 PROCEDURE — GZB2ZZZ ELECTROCONVULSIVE THERAPY, BILATERAL-SINGLE SEIZURE: ICD-10-PCS | Performed by: STUDENT IN AN ORGANIZED HEALTH CARE EDUCATION/TRAINING PROGRAM

## 2024-06-21 RX ORDER — SODIUM CHLORIDE, SODIUM LACTATE, POTASSIUM CHLORIDE, CALCIUM CHLORIDE 600; 310; 30; 20 MG/100ML; MG/100ML; MG/100ML; MG/100ML
50 INJECTION, SOLUTION INTRAVENOUS CONTINUOUS
Status: DISCONTINUED | OUTPATIENT
Start: 2024-06-21 | End: 2024-07-12

## 2024-06-21 RX ORDER — GLYCOPYRROLATE 0.2 MG/ML
INJECTION INTRAMUSCULAR; INTRAVENOUS AS NEEDED
Status: DISCONTINUED | OUTPATIENT
Start: 2024-06-21 | End: 2024-06-21

## 2024-06-21 RX ORDER — SODIUM CHLORIDE, SODIUM LACTATE, POTASSIUM CHLORIDE, CALCIUM CHLORIDE 600; 310; 30; 20 MG/100ML; MG/100ML; MG/100ML; MG/100ML
INJECTION, SOLUTION INTRAVENOUS CONTINUOUS PRN
Status: DISCONTINUED | OUTPATIENT
Start: 2024-06-21 | End: 2024-06-21

## 2024-06-21 RX ORDER — SUCCINYLCHOLINE/SOD CL,ISO/PF 100 MG/5ML
SYRINGE (ML) INTRAVENOUS AS NEEDED
Status: DISCONTINUED | OUTPATIENT
Start: 2024-06-21 | End: 2024-06-21

## 2024-06-21 RX ORDER — KETOROLAC TROMETHAMINE 30 MG/ML
INJECTION, SOLUTION INTRAMUSCULAR; INTRAVENOUS AS NEEDED
Status: DISCONTINUED | OUTPATIENT
Start: 2024-06-21 | End: 2024-06-21

## 2024-06-21 RX ORDER — MIDAZOLAM HYDROCHLORIDE 2 MG/2ML
INJECTION, SOLUTION INTRAMUSCULAR; INTRAVENOUS AS NEEDED
Status: DISCONTINUED | OUTPATIENT
Start: 2024-06-21 | End: 2024-06-21

## 2024-06-21 RX ORDER — HYDRALAZINE HYDROCHLORIDE 20 MG/ML
INJECTION INTRAMUSCULAR; INTRAVENOUS AS NEEDED
Status: DISCONTINUED | OUTPATIENT
Start: 2024-06-21 | End: 2024-06-21

## 2024-06-21 RX ORDER — LABETALOL HYDROCHLORIDE 5 MG/ML
INJECTION, SOLUTION INTRAVENOUS AS NEEDED
Status: DISCONTINUED | OUTPATIENT
Start: 2024-06-21 | End: 2024-06-21

## 2024-06-21 RX ORDER — SODIUM CHLORIDE, SODIUM LACTATE, POTASSIUM CHLORIDE, CALCIUM CHLORIDE 600; 310; 30; 20 MG/100ML; MG/100ML; MG/100ML; MG/100ML
75 INJECTION, SOLUTION INTRAVENOUS CONTINUOUS
Status: DISCONTINUED | OUTPATIENT
Start: 2024-06-21 | End: 2024-07-12

## 2024-06-21 RX ORDER — ESMOLOL HYDROCHLORIDE 10 MG/ML
INJECTION INTRAVENOUS AS NEEDED
Status: DISCONTINUED | OUTPATIENT
Start: 2024-06-21 | End: 2024-06-21

## 2024-06-21 RX ORDER — ETOMIDATE 2 MG/ML
INJECTION INTRAVENOUS AS NEEDED
Status: DISCONTINUED | OUTPATIENT
Start: 2024-06-21 | End: 2024-06-21

## 2024-06-21 RX ADMIN — FLUTICASONE PROPIONATE 1 SPRAY: 50 SPRAY, METERED NASAL at 08:48

## 2024-06-21 RX ADMIN — POLYETHYLENE GLYCOL 3350 17 G: 17 POWDER, FOR SOLUTION ORAL at 08:40

## 2024-06-21 RX ADMIN — Medication 140 MG: at 07:03

## 2024-06-21 RX ADMIN — GLYCOPYRROLATE 1 MG: 1 TABLET ORAL at 17:06

## 2024-06-21 RX ADMIN — HYDRALAZINE HYDROCHLORIDE 20 MG: 20 INJECTION INTRAMUSCULAR; INTRAVENOUS at 07:08

## 2024-06-21 RX ADMIN — GLYCOPYRROLATE 1 MG: 1 TABLET ORAL at 08:39

## 2024-06-21 RX ADMIN — HYDROCHLOROTHIAZIDE 12.5 MG: 12.5 TABLET ORAL at 05:21

## 2024-06-21 RX ADMIN — LORATADINE 10 MG: 10 TABLET ORAL at 08:39

## 2024-06-21 RX ADMIN — CLOZAPINE 500 MG: 100 TABLET ORAL at 21:10

## 2024-06-21 RX ADMIN — NICOTINE POLACRILEX 2 MG: 2 GUM, CHEWING ORAL at 17:06

## 2024-06-21 RX ADMIN — GLYCOPYRROLATE 0.4 MG: 0.2 INJECTION INTRAMUSCULAR; INTRAVENOUS at 07:04

## 2024-06-21 RX ADMIN — LABETALOL HYDROCHLORIDE 10 MG: 5 INJECTION, SOLUTION INTRAVENOUS at 07:03

## 2024-06-21 RX ADMIN — HYDROCHLOROTHIAZIDE 12.5 MG: 12.5 TABLET ORAL at 08:36

## 2024-06-21 RX ADMIN — MELATONIN TAB 3 MG 3 MG: 3 TAB at 21:11

## 2024-06-21 RX ADMIN — Medication 50 MG: at 07:06

## 2024-06-21 RX ADMIN — CYANOCOBALAMIN TAB 1000 MCG 1000 MCG: 1000 TAB at 08:39

## 2024-06-21 RX ADMIN — SENNOSIDES AND DOCUSATE SODIUM 2 TABLET: 8.6; 5 TABLET ORAL at 17:06

## 2024-06-21 RX ADMIN — PROPRANOLOL HYDROCHLORIDE 10 MG: 10 TABLET ORAL at 05:25

## 2024-06-21 RX ADMIN — PROPRANOLOL HYDROCHLORIDE 10 MG: 10 TABLET ORAL at 21:11

## 2024-06-21 RX ADMIN — METFORMIN HYDROCHLORIDE 500 MG: 500 TABLET, FILM COATED ORAL at 08:36

## 2024-06-21 RX ADMIN — METFORMIN HYDROCHLORIDE 500 MG: 500 TABLET, FILM COATED ORAL at 17:06

## 2024-06-21 RX ADMIN — NICOTINE POLACRILEX 2 MG: 2 GUM, CHEWING ORAL at 21:10

## 2024-06-21 RX ADMIN — MIDAZOLAM 2 MG: 1 INJECTION INTRAMUSCULAR; INTRAVENOUS at 07:18

## 2024-06-21 RX ADMIN — ETOMIDATE INJECTION 20 MG: 2 SOLUTION INTRAVENOUS at 07:03

## 2024-06-21 RX ADMIN — ESCITALOPRAM OXALATE 20 MG: 10 TABLET, FILM COATED ORAL at 08:39

## 2024-06-21 RX ADMIN — GLYCOPYRROLATE 1 MG: 1 TABLET ORAL at 21:11

## 2024-06-21 RX ADMIN — KETOROLAC TROMETHAMINE 30 MG: 30 INJECTION, SOLUTION INTRAMUSCULAR; INTRAVENOUS at 07:10

## 2024-06-21 RX ADMIN — ARIPIPRAZOLE 15 MG: 15 TABLET ORAL at 08:38

## 2024-06-21 RX ADMIN — SODIUM CHLORIDE, SODIUM LACTATE, POTASSIUM CHLORIDE, AND CALCIUM CHLORIDE: .6; .31; .03; .02 INJECTION, SOLUTION INTRAVENOUS at 06:50

## 2024-06-21 RX ADMIN — AMLODIPINE BESYLATE 5 MG: 5 TABLET ORAL at 05:22

## 2024-06-21 RX ADMIN — ACETAMINOPHEN 650 MG: 325 TABLET, FILM COATED ORAL at 18:30

## 2024-06-21 RX ADMIN — SENNOSIDES AND DOCUSATE SODIUM 2 TABLET: 8.6; 5 TABLET ORAL at 08:39

## 2024-06-21 NOTE — PLAN OF CARE
Problem: Alteration in Thoughts and Perception  Goal: Agree to be compliant with medication regime, as prescribed and report medication side effects  Description: Interventions:  - Offer appropriate PRN medication and supervise ingestion; conduct AIMS, as needed   Outcome: Progressing  Goal: Attend and participate in unit activities, including therapeutic, recreational, and educational groups  Description: Interventions:  -Encourage Visitation and family involvement in care  Outcome: Progressing  Goal: Recognize dysfunctional thoughts, communicate reality-based thoughts at the time of discharge  Description: Interventions:  - Provide medication and psycho-education to assist patient in compliance and developing insight into his/her illness   Outcome: Progressing  Goal: Complete daily ADLs, including personal hygiene independently, as able  Description: Interventions:  - Observe, teach, and assist patient with ADLS  - Monitor and promote a balance of rest/activity, with adequate nutrition and elimination   Outcome: Progressing     Problem: Ineffective Coping  Goal: Participates in unit activities  Description: Interventions:  - Provide therapeutic environment   - Provide required programming   - Redirect inappropriate behaviors   Outcome: Progressing  Goal: Patient/Family participate in treatment and DC plans  Description: Interventions:  - Provide therapeutic environment  Outcome: Progressing     Problem: Depression  Goal: Treatment Goal: Demonstrate behavioral control of depressive symptoms, verbalize feelings of improved mood/affect, and adopt new coping skills prior to discharge  Outcome: Progressing  Goal: Verbalize thoughts and feelings  Description: Interventions:  - Assess and re-assess patient's level of risk   - Engage patient in 1:1 interactions, daily, for a minimum of 15 minutes   - Encourage patient to express feelings, fears, frustrations, hopes   Outcome: Progressing  Goal: Refrain from harming  self  Description: Interventions:  - Monitor patient closely, per order   - Supervise medication ingestion, monitor effects and side effects   Outcome: Progressing  Goal: Refrain from isolation  Description: Interventions:  - Develop a trusting relationship   - Encourage socialization   Outcome: Progressing  Goal: Refrain from self-neglect  Outcome: Progressing  Goal: Attend and participate in unit activities, including therapeutic, recreational, and educational groups  Description: Interventions:  - Provide therapeutic and educational activities daily, encourage attendance and participation, and document same in the medical record   Outcome: Progressing  Goal: Complete daily ADLs, including personal hygiene independently, as able  Description: Interventions:  - Observe, teach, and assist patient with ADLS  -  Monitor and promote a balance of rest/activity, with adequate nutrition and elimination   Outcome: Progressing     Problem: Anxiety  Goal: Anxiety is at manageable level  Description: Interventions:  - Assess and monitor patient's anxiety level.   - Monitor for signs and symptoms (heart palpitations, chest pain, shortness of breath, headaches, nausea, feeling jumpy, restlessness, irritable, apprehensive).   - Collaborate with interdisciplinary team and initiate plan and interventions as ordered.  - [place] patient to unit/surroundings  - Explain treatment plan  - Encourage participation in care  - Encourage verbalization of concerns/fears  - Identify coping mechanisms  - Assist in developing anxiety-reducing skills  - Administer/offer alternative therapies  - Limit or eliminate stimulants  Outcome: Progressing     Problem: Alteration in Orientation  Goal: Treatment Goal: Demonstrate a reduction of confusion and improved orientation to person, place, time and/or situation upon discharge, according to optimum baseline/ability  Outcome: Progressing  Goal: Interact with staff daily  Description: Interventions:  -  Assess and re-assess patient's level of orientation  - Engage patient in 1 on 1 interactions, daily, for a minimum of 15 minutes   - Establish rapport/trust with patient   Outcome: Progressing  Goal: Attend and participate in unit activities, including therapeutic, recreational, and educational groups  Description: Interventions:  - Provide therapeutic and educational activities daily, encourage attendance and participation, and document same in the medical record   - Provide appropriate opportunities for reminiscence   - Provide a consistent daily routine   - Encourage family contact/visitation   Outcome: Progressing  Goal: Complete daily ADLs, including personal hygiene independently, as able  Description: Interventions:  - Observe, teach, and assist patient with ADLS  Outcome: Progressing     Problem: DISCHARGE PLANNING - CARE MANAGEMENT  Goal: Discharge to post-acute care or home with appropriate resources  Description: INTERVENTIONS:  - Conduct assessment to determine patient/family and health care team treatment goals, and need for post-acute services based on payer coverage, community resources, and patient preferences, and barriers to discharge  - Address psychosocial, clinical, and financial barriers to discharge as identified in assessment in conjunction with the patient/family and health care team  - Arrange appropriate level of post-acute services according to patient’s   needs and preference and payer coverage in collaboration with the physician and health care team  - Communicate with and update the patient/family, physician, and health care team regarding progress on the discharge plan  - Arrange appropriate transportation to post-acute venues  Outcome: Progressing     Problem: Alteration in Thoughts and Perception  Goal: Treatment Goal: Gain control of psychotic behaviors/thinking, reduce/eliminate presenting symptoms and demonstrate improved reality functioning upon discharge  Outcome: Not  Progressing  Goal: Verbalize thoughts and feelings  Description: Interventions:  - Promote a nonjudgmental and trusting relationship with the patient through active listening and therapeutic communication  - Assess patient's level of functioning, behavior and potential for risk  - Engage patient in 1 on 1 interactions  - Encourage patient to express fears, feelings, frustrations, and discuss symptoms    - [place] patient to reality, help patient recognize reality-based thinking   - Administer medications as ordered and assess for potential side effects  - Provide the patient education related to the signs and symptoms of the illness and desired effects of prescribed medications  Outcome: Not Progressing  Goal: Refrain from acting on delusional thinking/internal stimuli  Description: Interventions:  - Monitor patient closely, per order   - Utilize least restrictive measures   - Set reasonable limits, give positive feedback for acceptable   - Administer medications as ordered and monitor of potential side effects  Outcome: Not Progressing

## 2024-06-21 NOTE — PROGRESS NOTES
06/21/24 0723   Team Meeting   Meeting Type Daily Rounds   Team Members Present   Team Members Present Physician;Nurse;;Other (Discipline and Name)   Patient/Family Present   Patient Present No   Patient's Family Present No     In attendance:    HOLLI Nazario CRNP Michaelyn Harding, MIGUEL Alvarado, [facility]  Carla Lux, [facility]  MATILDA Daniel.S.    Groups: 6/10    Pt received ECT this morning and lad labs drawn last night with several values not WNL. Pt reports ongoing AH that he is managing and slight improvements in mood overall. Pt is present on the unit and social with select peers.

## 2024-06-21 NOTE — PROGRESS NOTES
"Progress Note - Behavioral Health   Alberto Berumen 28 y.o. male MRN: [id number]  Unit/Bed#: [facility] 101-02 Encounter: [id number]  Code Status: Level 1 - Full Code    Assessment & Plan   Principal Problem:    Schizoaffective disorder, bipolar type (HCC)  Active Problems:    GERD (gastroesophageal reflux disease)    Medical clearance for psychiatric admission    Tobacco abuse    T wave inversion in EKG    Chronic idiopathic constipation    Confluent and reticulate papillomatosis    Primary hypertension    Elevated hemoglobin A1c    Bilateral lower extremity edema    Recommended Treatment:     Treatment plan, treatment progress and medication changes were reviewed with Nursing Staff, Pharmacy Service and Case Management in Treatment Team:  1.Continue with group therapy, milieu therapy and occupational therapy   2.Behavioral Health checks every 7 minutes   3.Continue frequent safety checks and vitals per unit protocol  4.Continue with SLIM medical management as indicated  5.Continue with current medication regimen for symptom management: Abilify 15mg PO Daily, Clozapine 500mg PO QHS, Escitalopram 20mg PO, Melatonin 3mg PO QHS  6.Disposition Planning: Discharge planning and efforts remain ongoing - Awaiting group home placement - No anticipated medication changes over the weekend.  Continue with maintenance ECT    Subjective:    Patient was seen today for continuation of care, records reviewed and patient was discussed with the morning case review team.      Alberto was seen today for psychiatric follow-up.  On assessment today, Alberto was found laying in bed.  Does report AH but is able to contract for safety, reports that they \"aren't as bad\" as when he started ECT.  Patient reports an overall improvement in his mood since starting treatment on the EAC.  Alberto reports adequate daytime energy and denies any difficulties with initiating or staying asleep.  Oral appetite and hydration is adequate.  Reports being sleepy " this AM, likely from ECT.  He is calm and cooperative otherwise while on the unit.   We reviewed once more the specific as-needed medications they can use going forward if they experience any insomnia or destabilization of their mood, they understood and were agreeable. Milieu visibility and group attendance encouraged to promote an active participation in treatment.    Alberto denies acute suicidal/self-harm ideation/intent/plan upon direct inquiry at this time. Alberto is able to contract for safety while on the unit and would feel comfortable seeking staff support should suicidal symptoms or urges appear or worsen. Alberto remains behaviorally appropriate, no agitation or aggression noted on exam or in report. Ablerto also denies HI/VH, and does not appear overtly manic.  Patient does not verbalize any experiences that can be categorized as paranoid, persecutory, bizarre, or somatic delusions. Alberto remains adherent to his current psychotropic medication regimen and denies any side effects from medications, as well as none noted on exam.    CLOZAPINE MONITORING:  The most recent ANC was 6.14 on 6/20, continue weekly CBC w/diff monitoring every Thursday  The patient is currently not in respiratory distress  Last QTC value was: 451 on 5/29  BP/HR within normal range as noted below  Prophylactic bowel regimen ordered and includes: Miralax and Senna, last bowel movement was on 6/20  No sialorrhea noted on exam  Most recent Troponin - <2 on 5/29, most recent CK - 323 on 6/3, most recent BNP - 5 on 5/29, most recent CRP - 10.0 on 6/3,  most recent Clozapine level - 458 on 6/13    Group Attendance: 6 / 10  Treatment Team: TBD  Psychiatric PRN's Needed: None    Review of Systems:  Behavior over the last 24 hours: Slowly improving  Sleep: sleeping okay throughout the night  Appetite: adequate  Medication side effects: none reported  ROS:no complaints, all other systems are negative    Objective:    Vitals:  Vitals:     06/21/24 0740   BP: 154/74   Pulse: 96   Resp: 16   Temp:    SpO2: 95%     Laboratory Results:  I have personally reviewed all pertinent laboratory/tests results.  Most Recent Labs:   Lab Results   Component Value Date    WBC 12.26 (H) 06/20/2024    RBC 5.29 06/20/2024    HGB 12.5 06/20/2024    HCT 42.1 06/20/2024     06/20/2024    RDW 14.4 06/20/2024    NEUTROABS 6.14 06/20/2024    SODIUM 136 06/03/2024    K 3.8 06/03/2024    CL 96 06/03/2024    CO2 29 06/03/2024    BUN 10 06/03/2024    CREATININE 1.00 06/03/2024    GLUC 97 06/03/2024    GLUF 103 (H) 05/29/2024    CALCIUM 10.1 06/03/2024    AST 33 06/03/2024    ALT 72 (H) 06/03/2024    ALKPHOS 80 06/03/2024    TP 8.5 (H) 06/03/2024    ALB 4.7 06/03/2024    TBILI 0.30 06/03/2024    CHOLESTEROL 156 03/14/2024    HDL 44 03/14/2024    TRIG 133 03/14/2024    LDLCALC 85 03/14/2024    NONHDLC 112 03/14/2024    LITHIUM 0.61 01/09/2024    [id number] 1.062 11/15/2023    HGBA1C 5.0 04/01/2024    EAG 97 04/01/2024     Mental Status Evaluation:  Appearance:  age appropriate, casually dressed, dressed appropriately, adequate grooming   Behavior:  cooperative, calm, fair eye contact   Speech:  normal rate, normal volume   Mood:  less anxious, less depressed   Affect:  blunted   Thought Process:  goal directed   Associations: circumstantial associations   Thought Content:  no overt delusions   Perceptual Disturbances: (+) improvement in AH, denies VH, does not appear internally preoccupied or stimulated   Risk Potential: Suicidal ideation - None at present, contracts for safety on the unit, would talk to staff if not feeling safe on the unit  Homicidal ideation - None at present  Potential for aggression - Not at present   Sensorium:  oriented to person, place, and time/date   Memory:  recent memory intact   Consciousness:  alert and awake   Attention/Concentration: attention span and concentration appear shorter than expected for age   Insight:  limited   Judgment:  limited   Gait/Station: normal gait/station, normal balance   Motor Activity: no abnormal movements     Progress Toward Goals: making gradual improvement.  Alberto continues to require inpatient psychiatric hospitalization for continued medication management and titration to optimize symptom reduction, improve sleep hygiene, and demonstrate adequate self-care.     Suicide/Homicide Risk Assessment:  Risk of Harm to Self:   Nursing Suicide Risk Assessment Last 24 hours: C-SSRS Risk (Since Last Contact)  Calculated C-SSRS Risk Score (Since Last Contact): No Risk Indicated    Risk of Harm to Others:  Nursing Homicide Risk Assessment: Violence Risk to Others: Denies within past 6 months    Behavioral Health Medications: all current active meds have been reviewed and continue current psychiatric medications.  Current Facility-Administered Medications   Medication Dose Route Frequency Provider Last Rate    acetaminophen  650 mg Oral Q4H PRN Jordan C Holter, DO      acetaminophen  650 mg Oral Q6H PRN HOLLI Lion      aluminum-magnesium hydroxide-simethicone  30 mL Oral Q4H PRN Jordan C Holter, DO      amLODIPine  5 mg Oral Daily HOLLI Lion      ARIPiprazole  15 mg Oral Daily Bora Rosario MD      Artificial Tears  1 drop Both Eyes Q3H PRN Jordan C Holter, DO      atropine  1 drop Sublingual Daily PRN HOLLI Lion      haloperidol lactate  2.5 mg Intramuscular Q4H PRN Max 4/day HOLLI Lion      And    LORazepam  1 mg Intramuscular Q4H PRN Max 4/day HOLLI Lion      And    benztropine  0.5 mg Intramuscular Q4H PRN Max 4/day HOLLI Lion      benztropine  1 mg Intramuscular Q4H PRN Max 6/day Jordan C Holter, DO      haloperidol lactate  5 mg Intramuscular Q4H PRN Max 4/day HOLLI Lion      And    LORazepam  2 mg Intramuscular Q4H PRN Max 4/day HOLLI Lion      And    benztropine  1 mg Intramuscular Q4H PRN Max 4/day HOLLI Lion      benztropine  1 mg Oral Q4H PRN  Max 6/day HOLLI Lion      benztropine  1 mg Oral Q4H PRN Max 6/day Jordan C Holter, DO      bisacodyl  10 mg Rectal Daily PRN HOLLI Lion      cloZAPine  500 mg Oral HS Bora Rosario MD      cyanocobalamin  1,000 mcg Oral Daily Pia Mantilla, HOLLI      hydrOXYzine HCL  50 mg Oral Q6H PRN Max 4/day Jordan C Holter, DO      Or    diphenhydrAMINE  50 mg Intramuscular Q6H PRN Jordan C Holter, DO      hydrOXYzine HCL  50 mg Oral Q6H PRN Max 4/day HOLLI Lion      Or    diphenhydrAMINE  50 mg Intramuscular Q6H PRN HOLLI Lion      diphenhydrAMINE-zinc acetate   Topical BID PRN HOLLI Lion      escitalopram  20 mg Oral Daily HOLLI Lion      fluticasone  1 spray Each Nare Daily Brina Guillen MD      glycopyrrolate  1 mg Oral TID Bora Rosario MD      haloperidol  1 mg Oral Q6H PRN HOLLI Lion      haloperidol  2.5 mg Oral Q4H PRN Max 4/day HOLLI Lion      haloperidol  5 mg Oral Q4H PRN Max 4/day HOLLI Lion      hydroCHLOROthiazide  12.5 mg Oral Daily Pia Mantilla, HOLLI      hydrocortisone   Topical 4x Daily PRN HOLLI Lion      hydrOXYzine HCL  100 mg Oral Q6H PRN Max 4/day Jordan C Holter, DO      Or    LORazepam  2 mg Intramuscular Q6H PRN Jordan C Holter, DO      hydrOXYzine HCL  100 mg Oral Q6H PRN Max 4/day HOLLI Lion      Or    LORazepam  2 mg Intramuscular Q6H PRN HOLLI Lion      hydrOXYzine HCL  25 mg Oral Q6H PRN Max 4/day Jordan C Holter, DO      ibuprofen  600 mg Oral Q8H PRN HOLLI Lion      lactated ringers  75 mL/hr Intravenous Continuous Anjel Arriaza CRNA Stopped (06/21/24 0729)    lactated ringers  50 mL/hr Intravenous Continuous Ramya Arana MD Stopped (06/21/24 0729)    lactated ringers  50 mL/hr Intravenous Continuous Ramya Arana MD Stopped (06/21/24 0562)    loratadine  10 mg Oral Daily Brina Guillen MD      melatonin  3 mg Oral HS Jordan C Holter, DO      metFORMIN  500 mg  Oral BID With Meals HOLLI Galvan      methocarbamol  500 mg Oral Q6H PRN HOLLI Lion      nicotine polacrilex  2 mg Oral Q4H PRN Bora Rosario MD      OLANZapine  5 mg Oral Q4H PRN Max 3/day [facility] Holter, DO      Or    OLANZapine  2.5 mg Intramuscular Q4H PRN Max 3/day [facility] Holter, DO      OLANZapine  5 mg Oral Q3H PRN Max 3/day [facility] Holter, DO      Or    OLANZapine  5 mg Intramuscular Q3H PRN Max 3/day [facility] Holter, DO      OLANZapine  2.5 mg Oral Q4H PRN Max 6/day [facility] Holter, DO      ondansetron  4 mg Oral Q6H PRN HOLLI Lion      polyethylene glycol  17 g Oral Daily HOLLI Lion      polyethylene glycol  17 g Oral Daily PRN [facility] Holter, DO      propranolol  10 mg Oral Q12H KAYLIE HOLLI Lion      senna-docusate sodium  1 tablet Oral Daily PRN [facility] Cresencioter, DO      senna-docusate sodium  2 tablet Oral BID HOLLI Lion      traZODone  50 mg Oral HS PRN HOLLI Lion      white petrolatum-mineral oil   Topical TID PRN HOLLI Lion       Risks / Benefits of Treatment:  Risks, benefits, and possible side effects of medications explained to patient. Patient has limited understanding of risks and benefits of treatment at this time, but agrees to take medications as prescribed.    Counseling / Coordination of Care:  Total floor/unit time spent today 25 minutes. Greater than 50% of total time was spent with the patient and / or family counseling and / or coordination of care. A description of the counseling / coordination of care:   Patient's progress discussed with staff in treatment team meeting.  Medications, treatment progress and treatment plan reviewed with patient.   Educated on importance of medication and treatment compliance.  Reassurance and supportive therapy provided.   Encouraged participation in milieu and group therapy on the unit.    HOLLI Lion 06/21/24

## 2024-06-21 NOTE — PLAN OF CARE
Problem: Ineffective Coping  Goal: Identifies ineffective coping skills  Outcome: Progressing  Goal: Identifies healthy coping skills  Outcome: Progressing  Goal: Demonstrates healthy coping skills  Outcome: Progressing  Goal: Participates in unit activities  Description: Interventions:  - Provide therapeutic environment   - Provide required programming   - Redirect inappropriate behaviors   Outcome: Progressing    Attended 13/19 groups offered during the past 2 days.

## 2024-06-21 NOTE — NURSING NOTE
Received pt in bed at change of shift with eyes closed; chest movement noted.  Continues the same thus this far as per q 7 min room checks.   Will continue to monitor behavior, sleeping pattern and any medical issues that may arise.    Maintained NPO for ECT this am.    0531:  Pt slept 5.5 hrs for this 11-7a shift.  PACU calling att his time for his ECT.  To be transported via WC with staff.  BP meds administered as ordered prior to ECT.  Pt voided.

## 2024-06-21 NOTE — SOCIAL WORK
SW attempted check in with pt 9:20am.   Pt observed sleeping soundly and snoring loudly after ECT this morning.   Pt unable to be verbally woken up.

## 2024-06-21 NOTE — NURSING NOTE
Pt calm and pleasant brightens on approach. Pt isolative to room following ECT treatment. Pt denies SI, HI, Anxiety, and depression. Pt cooperative with care and medication compliant.

## 2024-06-21 NOTE — PROCEDURES
"Procedure Note - ECT  Alberto Berumen 28 y.o. male MRN: [id number]    Patient is doing fairly well.  He remains constricted in affect and guarded.  However, he is pleasant on approach and is cooperative with interview.  He that his mood is \"okay\".  He does feel that ECT helps him.  He notes continued auditory hallucinations that are occasionally bothersome to him but he does feel that he is getting better at turning these out.  He denies any SI or HI and denies any visual hallucinations.  He denies any significant headaches and notes mild memory impairment that has not been particularly bothersome.  He is agreeable to proceed with ECT treatment this morning.    Time out was taken with staff to confirm correct patient and correct procedure to be performed.    Session Number: Maintenance    Diagnosis: Principal Problem:    Schizoaffective disorder, bipolar type ([facility])  Active Problems:    GERD (gastroesophageal reflux disease)    Medical clearance for psychiatric admission    Tobacco abuse    T wave inversion in EKG    Chronic idiopathic constipation    Confluent and reticulate papillomatosis    Primary hypertension    Elevated hemoglobin A1c    Bilateral lower extremity edema      ECT Type: Inpatient, Maintenance    Anesthesia: Etomidate    Electrode Placement: bilateral    Energy level: 100%      Seizure Duration     EE sec  EM sec observed    PSI 83.7%     Results:Clinical seizure was satisfactory, Patient tolerated ECT well    Vitals:    24 0740   BP: 154/74   Pulse: 96   Resp: 16   Temp:    SpO2: 95%        Medication Administration - last 24 hours from 2024 0752 to 2024 0752         Date/Time Order Dose Route Action Action by     2024 0522 EDT amLODIPine (NORVASC) tablet 5 mg 5 mg Oral Given Mireya Pearson RN     2024 0837 EDT amLODIPine (NORVASC) tablet 5 mg 5 mg Oral Given Verónica Bailey LPN     2024 0837 EDT escitalopram (LEXAPRO) tablet 20 mg 20 mg Oral Given " Verónica Bailey LPN     06/20/2024 0837 EDT polyethylene glycol (MIRALAX) packet 17 g 17 g Oral Given Verónica Bailey LPN     06/21/2024 0525 EDT propranolol (INDERAL) tablet 10 mg 10 mg Oral Given Mireya Pearson RN     06/20/2024 2117 EDT propranolol (INDERAL) tablet 10 mg 10 mg Oral Given Mare Berry RN     06/20/2024 0837 EDT propranolol (INDERAL) tablet 10 mg 10 mg Oral Given Verónica Bailey LPN     06/20/2024 1729 EDT senna-docusate sodium (SENOKOT S) 8.6-50 mg per tablet 2 tablet 2 tablet Oral Given Mare Berry RN     06/20/2024 0837 EDT senna-docusate sodium (SENOKOT S) 8.6-50 mg per tablet 2 tablet 2 tablet Oral Given Verónica Bailey LPN     06/20/2024 2117 EDT melatonin tablet 3 mg 3 mg Oral Given Mare Berry RN     06/20/2024 0837 EDT cyanocobalamin (VITAMIN B-12) tablet 1,000 mcg 1,000 mcg Oral Given Verónica Bailey LPN     06/20/2024 2117 EDT cloZAPine (CLOZARIL) tablet 500 mg 500 mg Oral Given Mare Berry RN     06/20/2024 2117 EDT nicotine polacrilex (NICORETTE) gum 2 mg 2 mg Oral Given Mare Berry RN     06/20/2024 1706 EDT nicotine polacrilex (NICORETTE) gum 2 mg 2 mg Oral Given Mare Berry RN     06/20/2024 1306 EDT nicotine polacrilex (NICORETTE) gum 2 mg 2 mg Oral Given Verónica Bailey LPN     06/20/2024 0837 EDT nicotine polacrilex (NICORETTE) gum 2 mg 2 mg Oral Given Verónica Bailey LPN     06/20/2024 0837 EDT fluticasone (FLONASE) 50 mcg/act nasal spray 1 spray 1 spray Each Nare Given Verónica Bailey LPN     06/20/2024 0837 EDT loratadine (CLARITIN) tablet 10 mg 10 mg Oral Given Verónica Bailey LPN     06/21/2024 0521 EDT hydroCHLOROthiazide tablet 12.5 mg 12.5 mg Oral Given Mireya Pearson RN     06/20/2024 0837 EDT hydroCHLOROthiazide tablet 12.5 mg 12.5 mg Oral Given Verónica Bailey LPN     06/20/2024 1729 EDT metFORMIN (GLUCOPHAGE) tablet 500 mg 500 mg Oral Given Mare Berry RN     06/20/2024 0837 EDT metFORMIN (GLUCOPHAGE) tablet 500 mg 500 mg Oral Given Verónica Bailey LPN      06/20/2024 2117 EDT glycopyrrolate (ROBINUL) tablet 1 mg 1 mg Oral Given Mare Berry RN     06/20/2024 1729 EDT glycopyrrolate (ROBINUL) tablet 1 mg 1 mg Oral Given Mare Berry RN     06/20/2024 0837 EDT glycopyrrolate (ROBINUL) tablet 1 mg 1 mg Oral Given Verónica Bailey LPN     06/20/2024 0837 EDT ARIPiprazole (ABILIFY) tablet 15 mg 15 mg Oral Given Verónica aBiley LPN     06/21/2024 0729 EDT lactated ringers infusion 0 mL/hr Intravenous Stopped Nhung Rivera RN     06/21/2024 0729 EDT lactated ringers infusion 0 mL/hr Intravenous Stopped Nhung Rivera RN     06/21/2024 0729 EDT lactated ringers infusion 0 mL/hr Intravenous Stopped Nhung Rivera RN             Media Information      Document Information    Clinical Image - Mobile Device   EEG   06/21/2024 07:10   Attached To:   Hospital Encounter on 3/29/24   Source Information    Guy Arroyo MD   Eac   Document History      Guy Arroyo MD 06/21/24

## 2024-06-21 NOTE — ANESTHESIA PREPROCEDURE EVALUATION
Procedure:  ELECTROCONVULSIVE THERAPY (ECT)  ECG: NSR with possible inferior infarct and lateral ischemia     Denies the following: CP/SOB with exertion, asthma, COPD, EULALIA, stroke/TIA, seizure    Relevant Problems   CARDIO   (+) Primary hypertension      GI/HEPATIC   (+) GERD (gastroesophageal reflux disease)             Anesthesia Plan  ASA Score- 2     Anesthesia Type- general with ASA Monitors.         Additional Monitors:     Airway Plan:            Plan Factors-    Chart reviewed. EKG reviewed.  Existing labs reviewed. Patient summary reviewed.    Patient is not a current smoker.              Induction- intravenous.    Postoperative Plan-     Perioperative Resuscitation Plan - Level 1 - Full Code.       Informed Consent- Anesthetic plan and risks discussed with patient.  I personally reviewed this patient with the CRNA. Discussed and agreed on the Anesthesia Plan with the CRNA..

## 2024-06-22 PROCEDURE — 99232 SBSQ HOSP IP/OBS MODERATE 35: CPT | Performed by: PHYSICIAN ASSISTANT

## 2024-06-22 RX ADMIN — ESCITALOPRAM OXALATE 20 MG: 10 TABLET, FILM COATED ORAL at 08:44

## 2024-06-22 RX ADMIN — FLUTICASONE PROPIONATE 1 SPRAY: 50 SPRAY, METERED NASAL at 08:50

## 2024-06-22 RX ADMIN — METFORMIN HYDROCHLORIDE 500 MG: 500 TABLET, FILM COATED ORAL at 08:50

## 2024-06-22 RX ADMIN — GLYCOPYRROLATE 1 MG: 1 TABLET ORAL at 08:44

## 2024-06-22 RX ADMIN — PROPRANOLOL HYDROCHLORIDE 10 MG: 10 TABLET ORAL at 22:04

## 2024-06-22 RX ADMIN — GLYCOPYRROLATE 1 MG: 1 TABLET ORAL at 22:04

## 2024-06-22 RX ADMIN — LORATADINE 10 MG: 10 TABLET ORAL at 08:44

## 2024-06-22 RX ADMIN — CLOZAPINE 500 MG: 100 TABLET ORAL at 22:04

## 2024-06-22 RX ADMIN — GLYCOPYRROLATE 1 MG: 1 TABLET ORAL at 16:46

## 2024-06-22 RX ADMIN — NICOTINE POLACRILEX 2 MG: 2 GUM, CHEWING ORAL at 08:44

## 2024-06-22 RX ADMIN — ARIPIPRAZOLE 15 MG: 15 TABLET ORAL at 08:44

## 2024-06-22 RX ADMIN — POLYETHYLENE GLYCOL 3350 17 G: 17 POWDER, FOR SOLUTION ORAL at 08:44

## 2024-06-22 RX ADMIN — SENNOSIDES AND DOCUSATE SODIUM 2 TABLET: 8.6; 5 TABLET ORAL at 17:11

## 2024-06-22 RX ADMIN — NICOTINE POLACRILEX 2 MG: 2 GUM, CHEWING ORAL at 17:11

## 2024-06-22 RX ADMIN — METFORMIN HYDROCHLORIDE 500 MG: 500 TABLET, FILM COATED ORAL at 16:46

## 2024-06-22 RX ADMIN — CYANOCOBALAMIN TAB 1000 MCG 1000 MCG: 1000 TAB at 08:44

## 2024-06-22 RX ADMIN — NICOTINE POLACRILEX 2 MG: 2 GUM, CHEWING ORAL at 12:50

## 2024-06-22 RX ADMIN — MELATONIN TAB 3 MG 3 MG: 3 TAB at 22:04

## 2024-06-22 RX ADMIN — SENNOSIDES AND DOCUSATE SODIUM 2 TABLET: 8.6; 5 TABLET ORAL at 08:44

## 2024-06-22 NOTE — PROGRESS NOTES
"Progress Note - Behavioral Health   Alberto Berumen 28 y.o. male MRN: [id number]  Unit/Bed#: [facility] 101-02 Encounter: [id number]  Code Status: Level 1 - Full Code    Assessment & Plan   Principal Problem:    Schizoaffective disorder, bipolar type (HCC)  Active Problems:    GERD (gastroesophageal reflux disease)    Medical clearance for psychiatric admission    Tobacco abuse    T wave inversion in EKG    Chronic idiopathic constipation    Confluent and reticulate papillomatosis    Primary hypertension    Elevated hemoglobin A1c    Bilateral lower extremity edema      Recommended Treatment:     Treatment plan, treatment progress and medication changes were reviewed with Nursing Staff:  1.Continue with group therapy, milieu therapy and occupational therapy   2.Behavioral Health checks every 7 minutes   3.Continue frequent safety checks and vitals per unit protocol  4.Continue with SLIM medical management as indicated  5.Continue with current medication regimen for symptom management: Abilify 15mg PO Daily, Clozapine 500mg PO QHS, Escitalopram 20mg PO, Melatonin 3mg PO QHS. ANC 6.14 on 6/20, Clozapine level 458 on 6/13.  6.Disposition Planning: As per primary team- continue with maintenance ECT. Discharge planning and efforts remain ongoing - Awaiting group home placement    Subjective:    Patient was seen today for continuation of care, records reviewed and patient was discussed with charge RN. He has been visible and social on the unit.    Alberto was seen today for psychiatric follow-up.  On assessment today, Alberto was guarded and suspicious initially, but agreed to encounter. He did appear more comfortable with time. He describes his mood as \"tired\" today. He does report AH and describes this as \"they say that they will hurt me or my family.\" He denies any changes with sleep or appetite. He denies any acute psychiatric complaints today.  We reviewed once more the specific as-needed medications they can use going " forward if they experience any insomnia or destabilization of their mood, they understood and were agreeable. Milieu visibility and group attendance encouraged to promote an active participation in treatment.    Alberto denies acute suicidal/self-harm ideation/intent/plan upon direct inquiry at this time. Alberto is able to contract for safety while on the unit and would feel comfortable seeking staff support should suicidal symptoms or urges appear or worsen. Alberto remains behaviorally appropriate, no agitation or aggression noted on exam or in report. Alberto also denies HI/VH, and does not appear overtly manic. Patient does not verbalize any experiences that can be categorized as paranoid, persecutory, bizarre, or somatic delusions. Alberto remains adherent to his current psychotropic medication regimen and denies any side effects from medications, as well as none noted on exam.    Review of Systems:  Behavior over the last 24 hours: Unchanged  Sleep: sleeping okay throughout the night  Appetite: adequate  Medication side effects: none reported  ROS:no complaints, all other systems are negative    Objective:    Vitals:  Vitals:    06/22/24 0739   BP: 109/62   Pulse: 82   Resp: 18   Temp: 98.7 °F (37.1 °C)   SpO2: 99%       Laboratory Results:  I have personally reviewed all pertinent laboratory/tests results.  Most Recent Labs:   Lab Results   Component Value Date    WBC 12.26 (H) 06/20/2024    RBC 5.29 06/20/2024    HGB 12.5 06/20/2024    HCT 42.1 06/20/2024     06/20/2024    RDW 14.4 06/20/2024    NEUTROABS 6.14 06/20/2024    SODIUM 136 06/03/2024    K 3.8 06/03/2024    CL 96 06/03/2024    CO2 29 06/03/2024    BUN 10 06/03/2024    CREATININE 1.00 06/03/2024    GLUC 97 06/03/2024    GLUF 103 (H) 05/29/2024    CALCIUM 10.1 06/03/2024    AST 33 06/03/2024    ALT 72 (H) 06/03/2024    ALKPHOS 80 06/03/2024    TP 8.5 (H) 06/03/2024    ALB 4.7 06/03/2024    TBILI 0.30 06/03/2024    CHOLESTEROL 156 03/14/2024     HDL 44 03/14/2024    TRIG 133 03/14/2024    LDLCALC 85 03/14/2024    NONHDLC 112 03/14/2024    LITHIUM 0.61 01/09/2024    [id number] 1.062 11/15/2023    HGBA1C 5.0 04/01/2024    EAG 97 04/01/2024       Mental Status Evaluation:  Appearance:  age appropriate, casually dressed, dressed appropriately, adequate grooming   Behavior:  guarded, suspicious    Speech:  normal rate and volume   Mood:  anxious   Affect:  blunted   Thought Process:  coherent, goal directed   Associations: circumstantial associations   Thought Content:  no overt delusions   Perceptual Disturbances: does not appear responding to internal stimuli, auditory hallucinations   Risk Potential: Suicidal ideation - None at present  Homicidal ideation - None at present  Potential for aggression - Not at present   Sensorium:  oriented to person, place, and time/date   Memory:  recent memory intact   Consciousness:  alert and awake   Attention/Concentration: attention span and concentration appear shorter than expected for age   Insight:  limited   Judgment: limited   Gait/Station: normal gait/station   Motor Activity: no abnormal movements     Progress Toward Goals:   Alberto continues to require inpatient psychiatric hospitalization for continued medication management and titration to optimize symptom reduction, improve sleep hygiene, and demonstrate adequate self-care.     Suicide/Homicide Risk Assessment:  Risk of Harm to Self:   Nursing Suicide Risk Assessment Last 24 hours: C-SSRS Risk (Since Last Contact)  Calculated C-SSRS Risk Score (Since Last Contact): No Risk Indicated    Risk of Harm to Others:  Nursing Homicide Risk Assessment: Violence Risk to Others: Denies within past 6 months    Behavioral Health Medications: all current active meds have been reviewed and continue current psychiatric medications.  Current Facility-Administered Medications   Medication Dose Route Frequency Provider Last Rate    acetaminophen  650 mg Oral Q4H PRN Ion  C Holter, DO      acetaminophen  650 mg Oral Q6H PRN HOLLI Lion      aluminum-magnesium hydroxide-simethicone  30 mL Oral Q4H PRN Jordan C Holter, DO      amLODIPine  5 mg Oral Daily HOLLI Lion      ARIPiprazole  15 mg Oral Daily Bora Rosario MD      Artificial Tears  1 drop Both Eyes Q3H PRN Jordan C Holter, DO      atropine  1 drop Sublingual Daily PRN HOLLI Lion      haloperidol lactate  2.5 mg Intramuscular Q4H PRN Max 4/day STEVE LionNP      And    LORazepam  1 mg Intramuscular Q4H PRN Max 4/day STEVE LionNP      And    benztropine  0.5 mg Intramuscular Q4H PRN Max 4/day HOLLI Lion      benztropine  1 mg Intramuscular Q4H PRN Max 6/day Jordan C Holter, DO      haloperidol lactate  5 mg Intramuscular Q4H PRN Max 4/day STEVE LionNP      And    LORazepam  2 mg Intramuscular Q4H PRN Max 4/day HOLLI Lion      And    benztropine  1 mg Intramuscular Q4H PRN Max 4/day HOLLI Lion      benztropine  1 mg Oral Q4H PRN Max 6/day HOLLI Lion      benztropine  1 mg Oral Q4H PRN Max 6/day Jordan C Holter, DO      bisacodyl  10 mg Rectal Daily PRN HOLLI Lion      cloZAPine  500 mg Oral HS Bora Rosario MD      cyanocobalamin  1,000 mcg Oral Daily HOLLI Galvan      hydrOXYzine HCL  50 mg Oral Q6H PRN Max 4/day Jordan C Holter, DO      Or    diphenhydrAMINE  50 mg Intramuscular Q6H PRN Jordan C Holter, DO      hydrOXYzine HCL  50 mg Oral Q6H PRN Max 4/day HOLLI Lion      Or    diphenhydrAMINE  50 mg Intramuscular Q6H PRN HOLLI Lion      diphenhydrAMINE-zinc acetate   Topical BID PRN HOLLI Lion      escitalopram  20 mg Oral Daily HOLLI Lion      fluticasone  1 spray Each Nare Daily Brina Guillen MD      glycopyrrolate  1 mg Oral TID Bora Rosario MD      haloperidol  1 mg Oral Q6H PRN HOLLI Lion      haloperidol  2.5 mg Oral Q4H PRN Max 4/day HOLLI Lion      haloperidol  5 mg Oral Q4H PRN  Max 4/day HOLLI Lion      hydroCHLOROthiazide  12.5 mg Oral Daily HOLLI Galvan      hydrocortisone   Topical 4x Daily PRN HOLLI Lion      hydrOXYzine HCL  100 mg Oral Q6H PRN Max 4/day [facility] Holter, DO      Or    LORazepam  2 mg Intramuscular Q6H PRN [facility] Holter, DO      hydrOXYzine HCL  100 mg Oral Q6H PRN Max 4/day HOLLI Lion      Or    LORazepam  2 mg Intramuscular Q6H PRN HOLLI Lion      hydrOXYzine HCL  25 mg Oral Q6H PRN Max 4/day [facility] Holter, DO      ibuprofen  600 mg Oral Q8H PRN HOLLI Lion      lactated ringers  75 mL/hr Intravenous Continuous Anjel Arriaza CRNA Stopped (06/21/24 0729)    lactated ringers  50 mL/hr Intravenous Continuous Ramya Arana MD Stopped (06/21/24 0729)    lactated ringers  50 mL/hr Intravenous Continuous Ramya Arana MD Stopped (06/21/24 0729)    loratadine  10 mg Oral Daily Brina Guillen MD      melatonin  3 mg Oral HS [facility] Holter, DO      metFORMIN  500 mg Oral BID With Meals HOLLI Galvan      methocarbamol  500 mg Oral Q6H PRN HOLLI Lion      nicotine polacrilex  2 mg Oral Q4H PRN Bora Rosario MD      OLANZapine  5 mg Oral Q4H PRN Max 3/day [facility] Holter, DO      Or    OLANZapine  2.5 mg Intramuscular Q4H PRN Max 3/day [facility] Holter, DO      OLANZapine  5 mg Oral Q3H PRN Max 3/day [facility] Holter, DO      Or    OLANZapine  5 mg Intramuscular Q3H PRN Max 3/day [facility] Holter, DO      OLANZapine  2.5 mg Oral Q4H PRN Max 6/day [facility] Holter, DO      ondansetron  4 mg Oral Q6H PRN HOLLI Lion      polyethylene glycol  17 g Oral Daily HOLLI Lion      polyethylene glycol  17 g Oral Daily PRN [facility] Holter, DO      propranolol  10 mg Oral Q12H KAYLIE HOLLI Lion      senna-docusate sodium  1 tablet Oral Daily PRN Jordan C Holter, DO senna-docusate sodium  2 tablet Oral BID HOLLI Lion      traZODone  50 mg Oral HS PRN HOLLI Lion       white petrolatum-mineral oil   Topical TID PRN HOLLI Lion         Risks / Benefits of Treatment:  Risks, benefits, and possible side effects of medications explained to patient and patient verbalizes understanding and agreement for treatment.    Counseling / Coordination of Care:  Total floor/unit time spent today 25 minutes. Greater than 50% of total time was spent with the patient and / or family counseling and / or coordination of care. A description of the counseling / coordination of care:   Patient's progress discussed with staff in treatment team meeting.  Medications, treatment progress and treatment plan reviewed with patient.   Educated on importance of medication and treatment compliance.  Reassurance and supportive therapy provided.   Encouraged participation in milieu and group therapy on the unit.    Tracy Hernandez PA-C 06/22/24

## 2024-06-22 NOTE — NURSING NOTE
UROLOGY Office Visit Note      1/8/2020    UROLOGY CHIEF COMPLAINT  Chief Complaint   Patient presents with   • Follow-up     HX BPH     UROLOGY HISTORY OF PRESENT ILLNESS    Preston Crabtree is a 75 year old male who presents in follow-up for BPH.  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  History of hematuria, (-) Ct scan/cystoscopy/cytology 7/7/10.   He denies blood clots, fever and chills. He does not have a hx of kidney stones. He is emptying his bladder well. The patient has nocturia x 3. The patient denies associated incontinence, post void dribbling pyuria, split stream, back/flank pain, abdominal pain.    The patient denies: hematuria and dysuria    Out of doxazosin 2 mg/proscar, stream slowed down, increased frequency.  The patient denies: hematuria and dysuria.    AUA sx score: 12.    PAST MEDICAL HISTORY    Osteoporosis, unspecified                       01/11/2000    Asthma                                                        Chronic cough                                                 Mild asthma                                                   BPH (benign prostatic hyperplasia)                            Cataracts, bilateral                                          PAST SURGICAL HISTORY    There is no previous surgical history on file.    MEDICATIONS    Current Outpatient Medications   Medication Sig   • atorvastatin (LIPITOR) 10 MG tablet TAKE 1 TABLET BY MOUTH DAILY   • montelukast (SINGULAIR) 10 MG tablet TAKE 1 TABLET BY MOUTH EVERY NIGHT   • albuterol-ipratropium 2.5 mg/0.5 mg (DUONEB) 0.5-2.5 (3) MG/3ML nebulizer solution Take 3 mLs by nebulization every 4 hours as needed for Wheezing. Dx J45.998 asthma   • montelukast (SINGULAIR) 10 MG tablet Take 1 tablet by mouth nightly.   • budesonide-formoterol (SYMBICORT) 160-4.5 MCG/ACT inhaler Inhale 2 puffs into the lungs 2 times daily.   • esomeprazole (NEXIUM) 20 MG  Upon reassessment pt reports pain decreased to 3/10. Tylenol mildly effective.   capsule Take 20 mg by mouth daily (before breakfast).   • finasteride (PROSCAR) 5 MG tablet Take 1 tablet by mouth daily.   • doxazosin (CARDURA) 2 MG tablet Take 1 tablet by mouth at bedtime.   • albuterol 108 (90 Base) MCG/ACT inhaler Inhale 2 puffs into the lungs every 4 hours as needed for Shortness of Breath or Wheezing.   • cetirizine (ZYRTEC) 10 MG tablet Take 10 mg by mouth daily. Indications: Hayfever   • Multiple Vitamins-Minerals (CENTRUM SILVER ADULT 50+ PO) Take 1 tablet by mouth daily.   • fluticasone (FLONASE) 50 MCG/ACT nasal spray Spray 2 sprays in each nostril daily.   • cholecalciferol (VITAMIN D3) 1000 UNITS tablet Take 2,000 Units by mouth daily.   • Ascorbic Acid (VITAMIN C WITH VONNIE HIPS) 1000 MG tablet Take 1,000 mg by mouth daily.   • aspirin 81 MG tablet Take 81 mg by mouth daily.   • Probiotic Product (FLORAJEN3 PO) Take 1 tablet by mouth daily.     No current facility-administered medications for this visit.      [employer] - [employer].     ALLERGIES    ALLERGIES:   Allergen Reactions   • Pollen SHORTNESS OF BREATH       Review of Systems      PHYSICAL EXAMINATION    Vitals signs:  Height 5' 11\" (1.803 m).  General:  Alert and oriented. No acute distress.  Psych:   Pleasant and appropriate affect.  HEENT:  Normal, cephalic, atraumatic.  Neck:   Soft and supple.  Cardiac: Regular rate.  Lung: No dyspnea or cough.  Abdomen:  Soft, not tender, not distended.  Extremities:  No clubbing or edema.  Prostate: 30 gr, smooth.    RESULTS  Creatinine (mg/dL)   Date Value   04/11/2019 1.13     GFR Estimate, Non  (no units)   Date Value   04/11/2019 64     GFR Estimate,  (no units)   Date Value   04/11/2019 74       Lab Results   Component Value Date    PSA 3.47 01/06/2020     ASSESSMENT/PLAN:  History of BPH, doing well, PSA is stable.  Follow up in 1 year with pre clinic psa.   Continue meds./refill.    Jam Lopez MD

## 2024-06-22 NOTE — PLAN OF CARE
Problem: Alteration in Thoughts and Perception  Goal: Treatment Goal: Gain control of psychotic behaviors/thinking, reduce/eliminate presenting symptoms and demonstrate improved reality functioning upon discharge  Outcome: Progressing  Goal: Verbalize thoughts and feelings  Description: Interventions:  - Promote a nonjudgmental and trusting relationship with the patient through active listening and therapeutic communication  - Assess patient's level of functioning, behavior and potential for risk  - Engage patient in 1 on 1 interactions  - Encourage patient to express fears, feelings, frustrations, and discuss symptoms    - [place] patient to reality, help patient recognize reality-based thinking   - Administer medications as ordered and assess for potential side effects  - Provide the patient education related to the signs and symptoms of the illness and desired effects of prescribed medications  Outcome: Progressing  Goal: Refrain from acting on delusional thinking/internal stimuli  Description: Interventions:  - Monitor patient closely, per order   - Utilize least restrictive measures   - Set reasonable limits, give positive feedback for acceptable   - Administer medications as ordered and monitor of potential side effects  Outcome: Progressing  Goal: Agree to be compliant with medication regime, as prescribed and report medication side effects  Description: Interventions:  - Offer appropriate PRN medication and supervise ingestion; conduct AIMS, as needed   Outcome: Progressing  Goal: Attend and participate in unit activities, including therapeutic, recreational, and educational groups  Description: Interventions:  -Encourage Visitation and family involvement in care  Outcome: Progressing  Goal: Recognize dysfunctional thoughts, communicate reality-based thoughts at the time of discharge  Description: Interventions:  - Provide medication and psycho-education to assist patient in compliance and developing  insight into his/her illness   Outcome: Progressing  Goal: Complete daily ADLs, including personal hygiene independently, as able  Description: Interventions:  - Observe, teach, and assist patient with ADLS  - Monitor and promote a balance of rest/activity, with adequate nutrition and elimination   Outcome: Progressing     Problem: Ineffective Coping  Goal: Participates in unit activities  Description: Interventions:  - Provide therapeutic environment   - Provide required programming   - Redirect inappropriate behaviors   Outcome: Progressing  Goal: Patient/Family participate in treatment and DC plans  Description: Interventions:  - Provide therapeutic environment  Outcome: Progressing     Problem: Depression  Goal: Treatment Goal: Demonstrate behavioral control of depressive symptoms, verbalize feelings of improved mood/affect, and adopt new coping skills prior to discharge  Outcome: Progressing  Goal: Refrain from harming self  Description: Interventions:  - Monitor patient closely, per order   - Supervise medication ingestion, monitor effects and side effects   Outcome: Progressing  Goal: Refrain from isolation  Description: Interventions:  - Develop a trusting relationship   - Encourage socialization   Outcome: Progressing  Goal: Refrain from self-neglect  Outcome: Progressing  Goal: Attend and participate in unit activities, including therapeutic, recreational, and educational groups  Description: Interventions:  - Provide therapeutic and educational activities daily, encourage attendance and participation, and document same in the medical record   Outcome: Progressing  Goal: Complete daily ADLs, including personal hygiene independently, as able  Description: Interventions:  - Observe, teach, and assist patient with ADLS  -  Monitor and promote a balance of rest/activity, with adequate nutrition and elimination   Outcome: Progressing     Problem: Anxiety  Goal: Anxiety is at manageable level  Description:  Interventions:  - Assess and monitor patient's anxiety level.   - Monitor for signs and symptoms (heart palpitations, chest pain, shortness of breath, headaches, nausea, feeling jumpy, restlessness, irritable, apprehensive).   - Collaborate with interdisciplinary team and initiate plan and interventions as ordered.  - [place] patient to unit/surroundings  - Explain treatment plan  - Encourage participation in care  - Encourage verbalization of concerns/fears  - Identify coping mechanisms  - Assist in developing anxiety-reducing skills  - Administer/offer alternative therapies  - Limit or eliminate stimulants  Outcome: Progressing     Problem: Alteration in Orientation  Goal: Treatment Goal: Demonstrate a reduction of confusion and improved orientation to person, place, time and/or situation upon discharge, according to optimum baseline/ability  Outcome: Progressing  Goal: Interact with staff daily  Description: Interventions:  - Assess and re-assess patient's level of orientation  - Engage patient in 1 on 1 interactions, daily, for a minimum of 15 minutes   - Establish rapport/trust with patient   Outcome: Progressing  Goal: Cooperate with recommended testing/procedures  Description: Interventions:  - Determine need for ancillary testing  - Observe for mental status changes  - Implement falls/precaution protocol   Outcome: Progressing     Problem: Electroconvulsive therapy (ECT)  Goal: Treatment Goal: Demonstrate a reduction of confusion and improved orientation to person, place, time and/or situation upon discharge, according to optimum baseline/ability  Outcome: Progressing  Goal: Achieves stable or improved neurological status  Description: INTERVENTIONS  - Monitor and report changes in neurological status  - Monitor vital signs such as temperature, blood pressure, glucose, and any other labs ordered   - Initiate measures to prevent increased intracranial pressure  - Monitor for seizure activity and implement  precautions if appropriate      Outcome: Progressing  Goal: Achieves maximal functionality and self care  Description: INTERVENTIONS  - Monitor swallowing and airway patency with patient fatigue and changes in neurological status  - Encourage and assist patient to increase activity and self care.   - Encourage visually impaired, hearing impaired and aphasic patients to use assistive/communication devices  Outcome: Progressing  Goal: Maintain or return mobility to safest level of function  Description: INTERVENTIONS:  - Assess patient's ability to carry out ADLs; assess patient's baseline for ADL function and identify physical deficits which impact ability to perform ADLs (bathing, care of mouth/teeth, toileting, grooming, dressing, etc.)  - Assess/evaluate cause of self-care deficits   - Assess range of motion  - Assess patient's mobility  - Assess patient's need for assistive devices and provide as appropriate  - Encourage maximum independence but intervene and supervise when necessary  - Involve family in performance of ADLs  - Assess for home care needs following discharge   - Consider OT consult to assist with ADL evaluation and planning for discharge  - Provide patient education as appropriate  Outcome: Progressing  Goal: Absence of urinary retention  Description: INTERVENTIONS:  - Assess patient’s ability to void and empty bladder  - Monitor I/O  - Bladder scan as needed  - Discuss with physician/AP medications to alleviate retention as needed  - Discuss catheterization for long term situations as appropriate  Outcome: Progressing  Goal: Minimal or absence of nausea and/or vomiting  Description: INTERVENTIONS:  - Administer IV fluids if ordered to ensure adequate hydration  - Maintain NPO status until nausea and vomiting are resolved  - Nasogastric tube if ordered  - Administer ordered antiemetic medications as needed  - Provide nonpharmacologic comfort measures as appropriate  - Advance diet as tolerated, if  ordered  - Consider nutrition services referral to assist patient with adequate nutrition and appropriate food choices  Outcome: Progressing  Goal: Maintains adequate nutritional intake  Description: INTERVENTIONS:  - Monitor percentage of each meal consumed  - Identify factors contributing to decreased intake, treat as appropriate  - Assist with meals as needed  - Monitor I&O, weight, and lab values if indicated  - Obtain nutrition services referral as needed  Outcome: Progressing     Problem: DISCHARGE PLANNING - CARE MANAGEMENT  Goal: Discharge to post-acute care or home with appropriate resources  Description: INTERVENTIONS:  - Conduct assessment to determine patient/family and health care team treatment goals, and need for post-acute services based on payer coverage, community resources, and patient preferences, and barriers to discharge  - Address psychosocial, clinical, and financial barriers to discharge as identified in assessment in conjunction with the patient/family and health care team  - Arrange appropriate level of post-acute services according to patient’s   needs and preference and payer coverage in collaboration with the physician and health care team  - Communicate with and update the patient/family, physician, and health care team regarding progress on the discharge plan  - Arrange appropriate transportation to post-acute venues  Outcome: Progressing

## 2024-06-22 NOTE — NURSING NOTE
Patient is blunted, pleasant, and cooperative. Visible and social. Med and meal compliant. Appears disheveled. Attending groups. No reports of psychiatric symptoms. Continuous rounding maintained.

## 2024-06-22 NOTE — NURSING NOTE
Received pt in bed at change of shift 2300 with eyes closed; chest movement noted.  Continues the same thus this far as per q 7 min room checks.   Will continue to monitor behavior, sleeping pattern and any medical issues that may arise.    0548:  Sleeping 7+ hrs thus this far

## 2024-06-22 NOTE — NURSING NOTE
Patient is present on the milieu and social with select peers. He consumed 100 % of dinner. Took his medications without incidence. Nicorette gum given x 2. Pt denied all psychiatric symptoms. Appears internally preoccupied. Pt is calm, polite, pleasant, and cooperative. Attended Community meeting and wrap up groups. Pt requested to have a virtual visit. Reminded pt of the virtual visiting hours and that they have to be set up by his CM. No behavioral issues.

## 2024-06-23 VITALS
HEIGHT: 66 IN | DIASTOLIC BLOOD PRESSURE: 70 MMHG | OXYGEN SATURATION: 99 % | SYSTOLIC BLOOD PRESSURE: 130 MMHG | BODY MASS INDEX: 41.85 KG/M2 | RESPIRATION RATE: 18 BRPM | TEMPERATURE: 97.8 F | WEIGHT: 260.38 LBS | HEART RATE: 101 BPM

## 2024-06-23 PROCEDURE — 99232 SBSQ HOSP IP/OBS MODERATE 35: CPT | Performed by: STUDENT IN AN ORGANIZED HEALTH CARE EDUCATION/TRAINING PROGRAM

## 2024-06-23 RX ADMIN — GLYCOPYRROLATE 1 MG: 1 TABLET ORAL at 17:16

## 2024-06-23 RX ADMIN — CLOZAPINE 500 MG: 100 TABLET ORAL at 21:12

## 2024-06-23 RX ADMIN — AMLODIPINE BESYLATE 5 MG: 5 TABLET ORAL at 08:31

## 2024-06-23 RX ADMIN — POLYETHYLENE GLYCOL 3350 17 G: 17 POWDER, FOR SOLUTION ORAL at 08:31

## 2024-06-23 RX ADMIN — METFORMIN HYDROCHLORIDE 500 MG: 500 TABLET, FILM COATED ORAL at 08:31

## 2024-06-23 RX ADMIN — GLYCOPYRROLATE 1 MG: 1 TABLET ORAL at 08:31

## 2024-06-23 RX ADMIN — HYDROCHLOROTHIAZIDE 12.5 MG: 12.5 TABLET ORAL at 08:31

## 2024-06-23 RX ADMIN — NICOTINE POLACRILEX 2 MG: 2 GUM, CHEWING ORAL at 12:58

## 2024-06-23 RX ADMIN — ESCITALOPRAM OXALATE 20 MG: 10 TABLET, FILM COATED ORAL at 08:31

## 2024-06-23 RX ADMIN — NICOTINE POLACRILEX 2 MG: 2 GUM, CHEWING ORAL at 17:16

## 2024-06-23 RX ADMIN — ARIPIPRAZOLE 15 MG: 15 TABLET ORAL at 08:31

## 2024-06-23 RX ADMIN — NICOTINE POLACRILEX 2 MG: 2 GUM, CHEWING ORAL at 08:40

## 2024-06-23 RX ADMIN — FLUTICASONE PROPIONATE 1 SPRAY: 50 SPRAY, METERED NASAL at 08:36

## 2024-06-23 RX ADMIN — LORATADINE 10 MG: 10 TABLET ORAL at 08:31

## 2024-06-23 RX ADMIN — SENNOSIDES AND DOCUSATE SODIUM 2 TABLET: 8.6; 5 TABLET ORAL at 17:16

## 2024-06-23 RX ADMIN — MELATONIN TAB 3 MG 3 MG: 3 TAB at 21:13

## 2024-06-23 RX ADMIN — SENNOSIDES AND DOCUSATE SODIUM 2 TABLET: 8.6; 5 TABLET ORAL at 08:31

## 2024-06-23 RX ADMIN — PROPRANOLOL HYDROCHLORIDE 10 MG: 10 TABLET ORAL at 08:31

## 2024-06-23 RX ADMIN — METFORMIN HYDROCHLORIDE 500 MG: 500 TABLET, FILM COATED ORAL at 17:16

## 2024-06-23 RX ADMIN — NICOTINE POLACRILEX 2 MG: 2 GUM, CHEWING ORAL at 21:13

## 2024-06-23 RX ADMIN — GLYCOPYRROLATE 1 MG: 1 TABLET ORAL at 21:13

## 2024-06-23 RX ADMIN — PROPRANOLOL HYDROCHLORIDE 10 MG: 10 TABLET ORAL at 21:13

## 2024-06-23 RX ADMIN — CYANOCOBALAMIN TAB 1000 MCG 1000 MCG: 1000 TAB at 08:31

## 2024-06-23 NOTE — PROGRESS NOTES
"Progress Note - Behavioral Health   Alberto Berumen 28 y.o. male MRN: [id number]  Unit/Bed#: [facility] 101-02 Encounter: [id number]  Code Status: Level 1 - Full Code    Assessment & Plan   Principal Problem:    Schizoaffective disorder, bipolar type (HCC)  Active Problems:    GERD (gastroesophageal reflux disease)    Medical clearance for psychiatric admission    Tobacco abuse    T wave inversion in EKG    Chronic idiopathic constipation    Confluent and reticulate papillomatosis    Primary hypertension    Elevated hemoglobin A1c    Bilateral lower extremity edema      Recommended Treatment:     Treatment plan, treatment progress and medication changes were reviewed with Nursing Staff:  1.Continue with group therapy, milieu therapy and occupational therapy   2.Behavioral Health checks every 7 minutes   3.Continue frequent safety checks and vitals per unit protocol  4.Continue with SLIM medical management as indicated  5.Continue with current medication regimen for symptom management: Abilify 15mg PO Daily, Clozapine 500mg PO QHS, Escitalopram 20mg PO, Melatonin 3mg PO QHS. ANC 6.14 on 6/20, Clozapine level 458 on 6/13.  6.Disposition Planning: As per primary team- continue with maintenance ECT. Discharge planning and efforts remain ongoing - Awaiting group home placement    Subjective:    Patient was seen today for continuation of care, records reviewed and patient was discussed with charge RN. Nursing reports that BP medications were held yesterday due to parameters.     Alberto was seen today for psychiatric follow-up.  On assessment today, Alberto was found in the milieu with peers. He was agreeable to encounter. He reports \"I'm fine.\" He denies any AH/VH today. He denies any major psychiatric complaints. He reports that he did sleep well last evening. He reports eating his breakfast today.  We reviewed once more the specific as-needed medications they can use going forward if they experience any insomnia or " destabilization of their mood, they understood and were agreeable. Milieu visibility and group attendance encouraged to promote an active participation in treatment.    Alberto denies acute suicidal/self-harm ideation/intent/plan upon direct inquiry at this time. Alberto is able to contract for safety while on the unit and would feel comfortable seeking staff support should suicidal symptoms or urges appear or worsen. Alberto remains behaviorally appropriate, no agitation or aggression noted on exam or in report. Alberto also denies HI/AH/VH, and does not appear overtly manic.  Patient does not verbalize any experiences that can be categorized as paranoid, persecutory, bizarre, or somatic delusions. Alberto remains adherent to his current psychotropic medication regimen and denies any side effects from medications, as well as none noted on exam.    Review of Systems:  Behavior over the last 24 hours: Unchanged  Sleep: sleeping okay throughout the night  Appetite: adequate  Medication side effects: none reported  ROS:no complaints, all other systems are negative    Objective:    Vitals:  Vitals:    06/23/24 0758   BP: 134/65   Pulse: 81   Resp: 18   Temp: 98.7 °F (37.1 °C)   SpO2: 99%       Laboratory Results:  I have personally reviewed all pertinent laboratory/tests results.  Most Recent Labs:   Lab Results   Component Value Date    WBC 12.26 (H) 06/20/2024    RBC 5.29 06/20/2024    HGB 12.5 06/20/2024    HCT 42.1 06/20/2024     06/20/2024    RDW 14.4 06/20/2024    NEUTROABS 6.14 06/20/2024    SODIUM 136 06/03/2024    K 3.8 06/03/2024    CL 96 06/03/2024    CO2 29 06/03/2024    BUN 10 06/03/2024    CREATININE 1.00 06/03/2024    GLUC 97 06/03/2024    GLUF 103 (H) 05/29/2024    CALCIUM 10.1 06/03/2024    AST 33 06/03/2024    ALT 72 (H) 06/03/2024    ALKPHOS 80 06/03/2024    TP 8.5 (H) 06/03/2024    ALB 4.7 06/03/2024    TBILI 0.30 06/03/2024    CHOLESTEROL 156 03/14/2024    HDL 44 03/14/2024    TRIG 133  03/14/2024    LDLCALC 85 03/14/2024    NONHDLC 112 03/14/2024    LITHIUM 0.61 01/09/2024    NTI6AEZGZCFM 1.062 11/15/2023    HGBA1C 5.0 04/01/2024    EAG 97 04/01/2024       Mental Status Evaluation:  Appearance:  age appropriate, casually dressed, dressed appropriately   Behavior:  guarded   Speech:  normal rate and volume   Mood:  anxious   Affect:  blunted   Thought Process:  concrete   Associations: concrete associations   Thought Content:  no overt delusions   Perceptual Disturbances: denies auditory hallucinations when asked, does not appear responding to internal stimuli   Risk Potential: Suicidal ideation - None at present  Homicidal ideation - None at present  Potential for aggression - Not at present   Sensorium:  oriented to person and place   Memory:  recent memory intact   Consciousness:  alert and awake   Attention/Concentration: attention span and concentration appear shorter than expected for age   Insight:  limited   Judgment: limited   Gait/Station: normal gait/station   Motor Activity: no abnormal movements     Progress Toward Goals:   Alberto continues to require inpatient psychiatric hospitalization for continued medication management and titration to optimize symptom reduction, improve sleep hygiene, and demonstrate adequate self-care.     Suicide/Homicide Risk Assessment:  Risk of Harm to Self:   Nursing Suicide Risk Assessment Last 24 hours: C-SSRS Risk (Since Last Contact)  Calculated C-SSRS Risk Score (Since Last Contact): No Risk Indicated    Risk of Harm to Others:  Nursing Homicide Risk Assessment: Violence Risk to Others: Denies within past 6 months    Behavioral Health Medications: all current active meds have been reviewed and continue current psychiatric medications.  Current Facility-Administered Medications   Medication Dose Route Frequency Provider Last Rate    acetaminophen  650 mg Oral Q4H PRN Jordan C Holter, DO      acetaminophen  650 mg Oral Q6H PRN HOLLI Lion       aluminum-magnesium hydroxide-simethicone  30 mL Oral Q4H PRN Jordan C Holter, DO      amLODIPine  5 mg Oral Daily HOLLI Lion      ARIPiprazole  15 mg Oral Daily Bora Rosario MD      Artificial Tears  1 drop Both Eyes Q3H PRN Jordan C Holter, DO      atropine  1 drop Sublingual Daily PRN HOLLI Lion      haloperidol lactate  2.5 mg Intramuscular Q4H PRN Max 4/day STEVE LionNP      And    LORazepam  1 mg Intramuscular Q4H PRN Max 4/day STEVE LionNP      And    benztropine  0.5 mg Intramuscular Q4H PRN Max 4/day STEVE LionNP      benztropine  1 mg Intramuscular Q4H PRN Max 6/day Jordan C Holter, DO      haloperidol lactate  5 mg Intramuscular Q4H PRN Max 4/day STEVE LionNP      And    LORazepam  2 mg Intramuscular Q4H PRN Max 4/day HOLLI Lion      And    benztropine  1 mg Intramuscular Q4H PRN Max 4/day HOLLI Lion      benztropine  1 mg Oral Q4H PRN Max 6/day HOLLI Lion      benztropine  1 mg Oral Q4H PRN Max 6/day Jordan C Holter, DO      bisacodyl  10 mg Rectal Daily PRN HOLLI Lion      cloZAPine  500 mg Oral HS Bora Rosario MD      cyanocobalamin  1,000 mcg Oral Daily HOLLI Galvan      hydrOXYzine HCL  50 mg Oral Q6H PRN Max 4/day Jordan C Holter, DO      Or    diphenhydrAMINE  50 mg Intramuscular Q6H PRN Jordan C Holter, DO      hydrOXYzine HCL  50 mg Oral Q6H PRN Max 4/day HOLLI Lion      Or    diphenhydrAMINE  50 mg Intramuscular Q6H PRN HOLLI Lion      diphenhydrAMINE-zinc acetate   Topical BID PRN HOLLI Lion      escitalopram  20 mg Oral Daily HOLLI Lion      fluticasone  1 spray Each Nare Daily Brina Guillen MD      glycopyrrolate  1 mg Oral TID Bora Rosario MD      haloperidol  1 mg Oral Q6H PRN HOLLI Lion      haloperidol  2.5 mg Oral Q4H PRN Max 4/day HOLLI Lion      haloperidol  5 mg Oral Q4H PRN Max 4/day HOLLI Lion      hydroCHLOROthiazide  12.5 mg Oral Daily  HOLLI Galvan      hydrocortisone   Topical 4x Daily PRN HOLLI Lion      hydrOXYzine HCL  100 mg Oral Q6H PRN Max 4/day [facility] Holter, DO      Or    LORazepam  2 mg Intramuscular Q6H PRN [facility] Holter, DO      hydrOXYzine HCL  100 mg Oral Q6H PRN Max 4/day HOLLI Lion      Or    LORazepam  2 mg Intramuscular Q6H PRN HOLLI Lion      hydrOXYzine HCL  25 mg Oral Q6H PRN Max 4/day [facility] Holter, DO      ibuprofen  600 mg Oral Q8H PRN HOLLI Lion      lactated ringers  75 mL/hr Intravenous Continuous Anjel Arriaza CRNA Stopped (06/21/24 0729)    lactated ringers  50 mL/hr Intravenous Continuous Ramya Arana MD Stopped (06/21/24 0729)    lactated ringers  50 mL/hr Intravenous Continuous Ramya Arana MD Stopped (06/21/24 0729)    loratadine  10 mg Oral Daily Brina Guillen MD      melatonin  3 mg Oral HS [facility] Holter, DO      metFORMIN  500 mg Oral BID With Meals HOLLI Galvan      methocarbamol  500 mg Oral Q6H PRN HOLLI Lion      nicotine polacrilex  2 mg Oral Q4H PRN Bora Rosario MD      OLANZapine  5 mg Oral Q4H PRN Max 3/day [facility] Holter, DO      Or    OLANZapine  2.5 mg Intramuscular Q4H PRN Max 3/day [facility] Holter, DO      OLANZapine  5 mg Oral Q3H PRN Max 3/day [facility] Holter, DO      Or    OLANZapine  5 mg Intramuscular Q3H PRN Max 3/day [facility] Holter, DO      OLANZapine  2.5 mg Oral Q4H PRN Max 6/day [facility] Holter, DO      ondansetron  4 mg Oral Q6H PRN HOLLI Lion      polyethylene glycol  17 g Oral Daily HOLLI Lion      polyethylene glycol  17 g Oral Daily PRN [facility] Holter, DO      propranolol  10 mg Oral Q12H KAYLIE HOLLI Lion      senna-docusate sodium  1 tablet Oral Daily PRN [facility] Holter, DO      senna-docusate sodium  2 tablet Oral BID HOLLI Lion      traZODone  50 mg Oral HS PRN HOLLI Lion      white petrolatum-mineral oil   Topical TID PRN HOLLI Lion          Risks / Benefits of Treatment:  Risks, benefits, and possible side effects of medications explained to patient and patient verbalizes understanding and agreement for treatment.    Counseling / Coordination of Care:  Total floor/unit time spent today 25 minutes. Greater than 50% of total time was spent with the patient and / or family counseling and / or coordination of care. A description of the counseling / coordination of care:   Patient's progress discussed with staff.  Medications, treatment progress and treatment plan reviewed with patient.   Educated on importance of medication and treatment compliance.  Reassurance and supportive therapy provided.   Encouraged participation in milieu and group therapy on the unit.    Tracy Hernandez PA-C 06/23/24

## 2024-06-23 NOTE — NURSING NOTE
Pt is cooperative and pleasant. Visible and social with peers in milieu. No behavior issues. No psychiatric symptoms reported.

## 2024-06-23 NOTE — NURSING NOTE
Patient visible and social on the unit. He is compliant with medications. Appetite is good. Patient cooperative with assessment. He refused to be weighed.

## 2024-06-23 NOTE — PLAN OF CARE
Problem: Alteration in Thoughts and Perception  Goal: Treatment Goal: Gain control of psychotic behaviors/thinking, reduce/eliminate presenting symptoms and demonstrate improved reality functioning upon discharge  Outcome: Progressing  Goal: Verbalize thoughts and feelings  Description: Interventions:  - Promote a nonjudgmental and trusting relationship with the patient through active listening and therapeutic communication  - Assess patient's level of functioning, behavior and potential for risk  - Engage patient in 1 on 1 interactions  - Encourage patient to express fears, feelings, frustrations, and discuss symptoms    - [place] patient to reality, help patient recognize reality-based thinking   - Administer medications as ordered and assess for potential side effects  - Provide the patient education related to the signs and symptoms of the illness and desired effects of prescribed medications  Outcome: Progressing  Goal: Refrain from acting on delusional thinking/internal stimuli  Description: Interventions:  - Monitor patient closely, per order   - Utilize least restrictive measures   - Set reasonable limits, give positive feedback for acceptable   - Administer medications as ordered and monitor of potential side effects  Outcome: Progressing  Goal: Agree to be compliant with medication regime, as prescribed and report medication side effects  Description: Interventions:  - Offer appropriate PRN medication and supervise ingestion; conduct AIMS, as needed   Outcome: Progressing  Goal: Attend and participate in unit activities, including therapeutic, recreational, and educational groups  Description: Interventions:  -Encourage Visitation and family involvement in care  Outcome: Progressing  Goal: Recognize dysfunctional thoughts, communicate reality-based thoughts at the time of discharge  Description: Interventions:  - Provide medication and psycho-education to assist patient in compliance and developing  insight into his/her illness   Outcome: Progressing  Goal: Complete daily ADLs, including personal hygiene independently, as able  Description: Interventions:  - Observe, teach, and assist patient with ADLS  - Monitor and promote a balance of rest/activity, with adequate nutrition and elimination   Outcome: Progressing     Problem: Ineffective Coping  Goal: Identifies ineffective coping skills  Outcome: Progressing  Goal: Identifies healthy coping skills  Outcome: Progressing  Goal: Demonstrates healthy coping skills  Outcome: Progressing  Goal: Participates in unit activities  Description: Interventions:  - Provide therapeutic environment   - Provide required programming   - Redirect inappropriate behaviors   Outcome: Progressing  Goal: Patient/Family participate in treatment and DC plans  Description: Interventions:  - Provide therapeutic environment  Outcome: Progressing  Goal: Patient/Family verbalizes awareness of resources  Outcome: Progressing     Problem: Depression  Goal: Treatment Goal: Demonstrate behavioral control of depressive symptoms, verbalize feelings of improved mood/affect, and adopt new coping skills prior to discharge  Outcome: Progressing  Goal: Verbalize thoughts and feelings  Description: Interventions:  - Assess and re-assess patient's level of risk   - Engage patient in 1:1 interactions, daily, for a minimum of 15 minutes   - Encourage patient to express feelings, fears, frustrations, hopes   Outcome: Progressing  Goal: Refrain from harming self  Description: Interventions:  - Monitor patient closely, per order   - Supervise medication ingestion, monitor effects and side effects   Outcome: Progressing  Goal: Refrain from isolation  Description: Interventions:  - Develop a trusting relationship   - Encourage socialization   Outcome: Progressing  Goal: Refrain from self-neglect  Outcome: Progressing  Goal: Attend and participate in unit activities, including therapeutic, recreational, and  educational groups  Description: Interventions:  - Provide therapeutic and educational activities daily, encourage attendance and participation, and document same in the medical record   Outcome: Progressing  Goal: Complete daily ADLs, including personal hygiene independently, as able  Description: Interventions:  - Observe, teach, and assist patient with ADLS  -  Monitor and promote a balance of rest/activity, with adequate nutrition and elimination   Outcome: Progressing     Problem: Anxiety  Goal: Anxiety is at manageable level  Description: Interventions:  - Assess and monitor patient's anxiety level.   - Monitor for signs and symptoms (heart palpitations, chest pain, shortness of breath, headaches, nausea, feeling jumpy, restlessness, irritable, apprehensive).   - Collaborate with interdisciplinary team and initiate plan and interventions as ordered.  - [place] patient to unit/surroundings  - Explain treatment plan  - Encourage participation in care  - Encourage verbalization of concerns/fears  - Identify coping mechanisms  - Assist in developing anxiety-reducing skills  - Administer/offer alternative therapies  - Limit or eliminate stimulants  Outcome: Progressing     Problem: Alteration in Orientation  Goal: Treatment Goal: Demonstrate a reduction of confusion and improved orientation to person, place, time and/or situation upon discharge, according to optimum baseline/ability  Outcome: Progressing  Goal: Interact with staff daily  Description: Interventions:  - Assess and re-assess patient's level of orientation  - Engage patient in 1 on 1 interactions, daily, for a minimum of 15 minutes   - Establish rapport/trust with patient   Outcome: Progressing  Goal: Express concerns related to confused thinking related to:  Description: Interventions:  - Encourage patient to express feelings, fears, frustrations, hopes  - Assign consistent caregivers   - [place]/re-orient patient as needed  - Allow comfort items, as  appropriate  - Provide visual cues, signs, etc.   Outcome: Progressing  Goal: Allow medical examinations, as recommended  Description: Interventions:  - Provide physical/neurological exams and/or referrals, per provider   Outcome: Progressing  Goal: Cooperate with recommended testing/procedures  Description: Interventions:  - Determine need for ancillary testing  - Observe for mental status changes  - Implement falls/precaution protocol   Outcome: Progressing  Goal: Attend and participate in unit activities, including therapeutic, recreational, and educational groups  Description: Interventions:  - Provide therapeutic and educational activities daily, encourage attendance and participation, and document same in the medical record   - Provide appropriate opportunities for reminiscence   - Provide a consistent daily routine   - Encourage family contact/visitation   Outcome: Progressing  Goal: Complete daily ADLs, including personal hygiene independently, as able  Description: Interventions:  - Observe, teach, and assist patient with ADLS  Outcome: Progressing     Problem: Electroconvulsive therapy (ECT)  Goal: Treatment Goal: Demonstrate a reduction of confusion and improved orientation to person, place, time and/or situation upon discharge, according to optimum baseline/ability  Outcome: Progressing  Goal: Verbalizes/displays adequate comfort level or baseline comfort level  Description: Interventions:  - Encourage patient to monitor pain and request assistance  - Assess pain using appropriate pain scale  - Administer analgesics based on type and severity of pain and evaluate response  - Implement non-pharmacological measures as appropriate and evaluate response  - Consider cultural and social influences on pain and pain management  - Notify physician/advanced practitioner if interventions unsuccessful or patient reports new pain  Outcome: Progressing  Goal: Achieves stable or improved neurological  status  Description: INTERVENTIONS  - Monitor and report changes in neurological status  - Monitor vital signs such as temperature, blood pressure, glucose, and any other labs ordered   - Initiate measures to prevent increased intracranial pressure  - Monitor for seizure activity and implement precautions if appropriate      Outcome: Progressing  Goal: Achieves maximal functionality and self care  Description: INTERVENTIONS  - Monitor swallowing and airway patency with patient fatigue and changes in neurological status  - Encourage and assist patient to increase activity and self care.   - Encourage visually impaired, hearing impaired and aphasic patients to use assistive/communication devices  Outcome: Progressing  Goal: Maintain or return mobility to safest level of function  Description: INTERVENTIONS:  - Assess patient's ability to carry out ADLs; assess patient's baseline for ADL function and identify physical deficits which impact ability to perform ADLs (bathing, care of mouth/teeth, toileting, grooming, dressing, etc.)  - Assess/evaluate cause of self-care deficits   - Assess range of motion  - Assess patient's mobility  - Assess patient's need for assistive devices and provide as appropriate  - Encourage maximum independence but intervene and supervise when necessary  - Involve family in performance of ADLs  - Assess for home care needs following discharge   - Consider OT consult to assist with ADL evaluation and planning for discharge  - Provide patient education as appropriate  Outcome: Progressing  Goal: Absence of urinary retention  Description: INTERVENTIONS:  - Assess patient’s ability to void and empty bladder  - Monitor I/O  - Bladder scan as needed  - Discuss with physician/AP medications to alleviate retention as needed  - Discuss catheterization for long term situations as appropriate  Outcome: Progressing  Goal: Minimal or absence of nausea and/or vomiting  Description: INTERVENTIONS:  -  Administer IV fluids if ordered to ensure adequate hydration  - Maintain NPO status until nausea and vomiting are resolved  - Nasogastric tube if ordered  - Administer ordered antiemetic medications as needed  - Provide nonpharmacologic comfort measures as appropriate  - Advance diet as tolerated, if ordered  - Consider nutrition services referral to assist patient with adequate nutrition and appropriate food choices  Outcome: Progressing  Goal: Maintains adequate nutritional intake  Description: INTERVENTIONS:  - Monitor percentage of each meal consumed  - Identify factors contributing to decreased intake, treat as appropriate  - Assist with meals as needed  - Monitor I&O, weight, and lab values if indicated  - Obtain nutrition services referral as needed  Outcome: Progressing     Problem: DISCHARGE PLANNING - CARE MANAGEMENT  Goal: Discharge to post-acute care or home with appropriate resources  Description: INTERVENTIONS:  - Conduct assessment to determine patient/family and health care team treatment goals, and need for post-acute services based on payer coverage, community resources, and patient preferences, and barriers to discharge  - Address psychosocial, clinical, and financial barriers to discharge as identified in assessment in conjunction with the patient/family and health care team  - Arrange appropriate level of post-acute services according to patient’s   needs and preference and payer coverage in collaboration with the physician and health care team  - Communicate with and update the patient/family, physician, and health care team regarding progress on the discharge plan  - Arrange appropriate transportation to post-acute venues  Outcome: Progressing

## 2024-06-24 PROCEDURE — 99232 SBSQ HOSP IP/OBS MODERATE 35: CPT | Performed by: PSYCHIATRY & NEUROLOGY

## 2024-06-24 RX ADMIN — SENNOSIDES AND DOCUSATE SODIUM 2 TABLET: 8.6; 5 TABLET ORAL at 17:32

## 2024-06-24 RX ADMIN — GLYCOPYRROLATE 1 MG: 1 TABLET ORAL at 21:20

## 2024-06-24 RX ADMIN — POLYETHYLENE GLYCOL 3350 17 G: 17 POWDER, FOR SOLUTION ORAL at 08:39

## 2024-06-24 RX ADMIN — PROPRANOLOL HYDROCHLORIDE 10 MG: 10 TABLET ORAL at 08:39

## 2024-06-24 RX ADMIN — NICOTINE POLACRILEX 2 MG: 2 GUM, CHEWING ORAL at 08:38

## 2024-06-24 RX ADMIN — CYANOCOBALAMIN TAB 1000 MCG 1000 MCG: 1000 TAB at 08:38

## 2024-06-24 RX ADMIN — NICOTINE POLACRILEX 2 MG: 2 GUM, CHEWING ORAL at 12:49

## 2024-06-24 RX ADMIN — GLYCOPYRROLATE 1 MG: 1 TABLET ORAL at 08:39

## 2024-06-24 RX ADMIN — ESCITALOPRAM OXALATE 20 MG: 10 TABLET, FILM COATED ORAL at 08:39

## 2024-06-24 RX ADMIN — FLUTICASONE PROPIONATE 1 SPRAY: 50 SPRAY, METERED NASAL at 08:40

## 2024-06-24 RX ADMIN — AMLODIPINE BESYLATE 5 MG: 5 TABLET ORAL at 08:39

## 2024-06-24 RX ADMIN — MELATONIN TAB 3 MG 3 MG: 3 TAB at 21:20

## 2024-06-24 RX ADMIN — METFORMIN HYDROCHLORIDE 500 MG: 500 TABLET, FILM COATED ORAL at 08:38

## 2024-06-24 RX ADMIN — LORATADINE 10 MG: 10 TABLET ORAL at 08:39

## 2024-06-24 RX ADMIN — NICOTINE POLACRILEX 2 MG: 2 GUM, CHEWING ORAL at 17:32

## 2024-06-24 RX ADMIN — SENNOSIDES AND DOCUSATE SODIUM 2 TABLET: 8.6; 5 TABLET ORAL at 08:39

## 2024-06-24 RX ADMIN — PROPRANOLOL HYDROCHLORIDE 10 MG: 10 TABLET ORAL at 21:20

## 2024-06-24 RX ADMIN — GLYCOPYRROLATE 1 MG: 1 TABLET ORAL at 17:32

## 2024-06-24 RX ADMIN — ARIPIPRAZOLE 15 MG: 15 TABLET ORAL at 08:38

## 2024-06-24 RX ADMIN — CLOZAPINE 500 MG: 100 TABLET ORAL at 21:20

## 2024-06-24 RX ADMIN — NICOTINE POLACRILEX 2 MG: 2 GUM, CHEWING ORAL at 21:36

## 2024-06-24 RX ADMIN — HYDROCHLOROTHIAZIDE 12.5 MG: 12.5 TABLET ORAL at 08:39

## 2024-06-24 RX ADMIN — METFORMIN HYDROCHLORIDE 500 MG: 500 TABLET, FILM COATED ORAL at 17:32

## 2024-06-24 NOTE — NURSING NOTE
Patient is visible and social on the unit. He is pleasant and cooperative. He is compliant with medications. Requests nicorette gum every 4 hours. Patient appetite is 100%. He attended groups.

## 2024-06-24 NOTE — PROGRESS NOTES
06/24/24 1030   Team Meeting   Meeting Type Tx Team Meeting   Initial Conference Date 06/24/24   Next Conference Date 07/08/24   Team Members Present   Team Members Present Physician;Nurse;;Other (Discipline and Name)   Physician Team Member Abraham   Nursing Team Member Claudia   Care Management Team Member JORDAN Alvarado   Other (Discipline and Name) Gaby - [facility], Yadira - [facility], Geronimo - [facility]   Patient/Family Present   Patient Present Yes   Patient's Family Present No   OTHER   Team Meeting - Additional Comments Pt attended his tx team meeting. Pt read through his self-assessment and shared ongoing depression, passive SI, anxiety, difficulty focusing, ongoing voices, irritability and lack of enjoyment/motivation are struggles. Pt is now on maintenance ECT and reports feeling that his voices are not improving. Pt was unable to have his scheduled virtual visit Friday but does not recall why.

## 2024-06-24 NOTE — NURSING NOTE
Patient cooperative with staff and peers. Visible and social with peers all evening. Offered no complaints. Behaviors controlled and appropriate. Reported he is having no complaints of any kind. Medication compliant as well.

## 2024-06-24 NOTE — PROGRESS NOTES
06/24/24 0741   Team Meeting   Meeting Type Daily Rounds   Team Members Present   Team Members Present Physician;Nurse;;Other (Discipline and Name)   Patient/Family Present   Patient Present No   Patient's Family Present No     In attendance:  MD Eveline Gamino, HOLLI Taylor, RN  Luisana Alvarado, \[facility]\""W  Carla Lux, \[facility]\""W  Irais Mcdowell M.S.    Groups: 3/9    Pt is due for labs Thursday. No bx issues noted. Pt had ECT Friday. Pt refused weekly weigh-in. Pt reports ongoing auditory hallucinations.

## 2024-06-24 NOTE — NURSING NOTE
Patient slept well throughout the night with no signs of distress.  Patient slept at least 8 hours.

## 2024-06-24 NOTE — PROGRESS NOTES
Psychiatry Progress Note [facility]    Alberto Berumen 28 y.o. male MRN: [id number]  Unit/Bed#: [facility] 101-02 Encounter: [id number]  Code Status: Level 1 - Full Code    PCP: Joce Juan MD    Date of Admission:  3/29/2024 2008   Date of Service:  06/24/24    Patient Active Problem List   Diagnosis    GERD (gastroesophageal reflux disease)    Medical clearance for psychiatric admission    Schizoaffective disorder, bipolar type (HCC)    Tobacco abuse    T wave inversion in EKG    Syringoma    Chronic idiopathic constipation    Vitamin B 12 deficiency    Vitamin D deficiency    Confluent and reticulate papillomatosis    Class 2 obesity in adult    Primary hypertension    Elevated hemoglobin A1c    Bilateral lower extremity edema     Review of systems: Unremarkable refused weekly weight  Psychiatric diagnosis: Schizoaffective bipolar  assessment  Overall Status: Stated still hearing threatening voices but feels the ECT have definitely helped and still with passive death wishes over the voices but not actively suicidal somewhat withdrawn and isolated with a constricted affect  Certification Statement: The patient will continue to require additional inpatient hospital stay due to ongoing voices that are threatening to kill him and his family lack of response to medications and ECT so far.     Medications: Clozapine 500 mg at bedtime, propranolol 10 mg every 12 hours as as needed for anxiety, Abilify 15 mg mg at bedtime Lexapro 20 mg once a day, atropine eyedrops sublingual for drooling of saliva and senna 2 tablets twice a day for constipation  All medications reviewed and recommend they be continued for symptom management   Side effects from treatment: None reported  Medication changes   None today  Medication education   Risks side effects benefits and precautions of medications discussed with patient and he did verbalize an understanding about risks for metabolic syndrome from being on  neuroleptics and is form tardive dyskinesia etc. and special precautions about being on clozapine   Understanding of medications: Has some understanding   Justification for dual anti-psychotics: Not applicable    Non-pharmacological treatments  Continue with individual, group, milieu and occupational therapy using recovery principles and psycho-education about accepting illness and the need for treatment.   to contact aunt and explore ACT team referral which he never had  ECT to be continued  q 2 weekly for maintenance x 6  Refuses to see a dietician and agrees to do more exercises as he is about 100 lbs overweight   Prolactin level high so Risperdal replaced with Abilify    Safety  Safety and communication plan established to target dynamic risk factors discussed above.    Discharge Plan   Back to his aunt with an ACT team    Interval Progress   Patient claims these ECTs have definitely helped decrease the voices but still hears the same threatening voices which tell him that they are going to kill him and his family which has been going on for more than 2 years.  Compliant with medications and eating well and sleeping well attending some groups but hygiene and grooming is still not great.  Continues to have a flat affect rosacea but not aggressive or agitated with no need for PRNs.  Superficial preoccupied claims to feel depressed over the voices with passive death wishes but no active intent or plans and able to contract for safety  I Acceptance by patient: Accepting  Hopefulness in recovery: Living with his sister and aunt  Involved in reintegration process: Talking to his sister unknown  Trusting in relationship with psychiatrist: Trusting  Sleep: Good  Appetite: Good  Compliance with Medications: Good  Group attendance: Some 3/9  Significant events: Improving but still with same threatening voices    Mental Status Exam  Appearance: age appropriate, improved grooming, looks older than stated age,  overweight, with hair in dreadlocks casually dressed with a clean shaven face, fairly groomed with good eye contact  attended team meeting today with a hooded sweat shirt on   behavior: cooperative, mildly anxious, evasive, gesturing, slow responses.   speech: normal rate, normal volume, normal pitch  Mood: dysphoric, anxious, continues to report feeling good with the ECT   affect: constricted, inappropriate, mood-congruent sad with  low self esteem   Thought Process: organized, logical, coherent, goal directed, linear, decreased rate of thoughts, slowing of thoughts, negative thinking, impaired abstract reasoning, concrete  Thought Content: paranoid ideation, some paranoia, grandiose ideas, intrusive thoughts, preoccupied, chronic, continues to report paranoia about people threatening to kill him and his family because of the voices.  He believes ECT has helped so that he is not much in his head.  No other delusions elicited.  No current suicidal or homicidal thoughts and no plans verbalized but today reports having passive death wishes because of voices with no plans and able to CFS and it has not changed yet  .  No phobias obsessions compulsions or distorted body perceptions reported.  Preoccupied with wanting to get ECTs more often despite reminding him that it may impair his memory and finally he agreed to such as it is to every 2 weeks  Perceptual Disturbances: still reporting same threatening voices   Hx Risk Factors: chronic psychiatric problems, chronic anxiety symptoms, chronic psychotic symptoms,    Sensorium:oriented x 3 spheres and situation   Cognition: recent and remote memory grossly intact  Consciousness: alert and awake  Attention: attention span and concentration are age appropriate  Intellect: appears to be of average intelligence  Insight: intact  Judgement: intact  Motor Activity: no abnormal movements     Vitals  Temp:  [97.8 °F (36.6 °C)-98.7 °F (37.1 °C)] 97.8 °F (36.6 °C)  HR:  []  101  Resp:  [18] 18  BP: (130-146)/(65-85) 130/70  SpO2:  [99 %] 99 %  No intake or output data in the 24 hours ending 06/24/24 0424              Lab Results: All Labs For Current Hospital Admission Reviewed     Current Facility-Administered Medications   Medication Dose Route Frequency Provider Last Rate    acetaminophen  650 mg Oral Q4H PRN Jordan C Holter, DO      acetaminophen  650 mg Oral Q6H PRN HOLLI Lion      aluminum-magnesium hydroxide-simethicone  30 mL Oral Q4H PRN Jordan C Holter, DO      amLODIPine  5 mg Oral Daily STEVE LionNP      ARIPiprazole  15 mg Oral Daily Bora Rosario MD      Artificial Tears  1 drop Both Eyes Q3H PRN Jordan C Holter, DO      atropine  1 drop Sublingual Daily PRN HOLLI Lion      haloperidol lactate  2.5 mg Intramuscular Q4H PRN Max 4/day HOLLI Lion      And    LORazepam  1 mg Intramuscular Q4H PRN Max 4/day HOLLI Lion      And    benztropine  0.5 mg Intramuscular Q4H PRN Max 4/day HOLLI Lion      benztropine  1 mg Intramuscular Q4H PRN Max 6/day Jordan C Holter,       haloperidol lactate  5 mg Intramuscular Q4H PRN Max 4/day HOLLI Lion      And    LORazepam  2 mg Intramuscular Q4H PRN Max 4/day HOLLI Lion      And    benztropine  1 mg Intramuscular Q4H PRN Max 4/day HOLLI Lion      benztropine  1 mg Oral Q4H PRN Max 6/day HOLLI Lion      benztropine  1 mg Oral Q4H PRN Max 6/day Jordan C Holter, DO      bisacodyl  10 mg Rectal Daily PRN HOLLI Lion      cloZAPine  500 mg Oral HS Bora Rosario MD      cyanocobalamin  1,000 mcg Oral Daily HOLLI Galvan      hydrOXYzine HCL  50 mg Oral Q6H PRN Max 4/day Jordan C Holter, DO      Or    diphenhydrAMINE  50 mg Intramuscular Q6H PRN Jordan C Holter, DO      hydrOXYzine HCL  50 mg Oral Q6H PRN Max 4/day HOLLI Lion      Or    diphenhydrAMINE  50 mg Intramuscular Q6H PRN HOLLI Lion      diphenhydrAMINE-zinc acetate    Topical BID PRN HOLLI Lion      escitalopram  20 mg Oral Daily EvelineHOLLI Christy      fluticasone  1 spray Each Nare Daily Brina Guillen MD      glycopyrrolate  1 mg Oral TID Bora Rosario MD      haloperidol  1 mg Oral Q6H PRN Eveline HOLLI Hunt      haloperidol  2.5 mg Oral Q4H PRN Max 4/day Eveline HOLLI Hunt      haloperidol  5 mg Oral Q4H PRN Max 4/day EvelineHOLLI Christy      hydroCHLOROthiazide  12.5 mg Oral Daily HOLLI Galvan      hydrocortisone   Topical 4x Daily PRN EvelineHOLLI Christy      hydrOXYzine HCL  100 mg Oral Q6H PRN Max 4/day Ion C Holter, DO      Or    LORazepam  2 mg Intramuscular Q6H PRN Ion C Holter, DO      hydrOXYzine HCL  100 mg Oral Q6H PRN Max 4/day HOLLI Lion      Or    LORazepam  2 mg Intramuscular Q6H PRN EvelineHOLLI Christy      hydrOXYzine HCL  25 mg Oral Q6H PRN Max 4/day Ion C Holter, DO      ibuprofen  600 mg Oral Q8H PRN HOLLI Lion      lactated ringers  75 mL/hr Intravenous Continuous Anjel Arriaza CRNA Stopped (06/21/24 0729)    lactated ringers  50 mL/hr Intravenous Continuous Ramya Arana MD Stopped (06/21/24 0729)    lactated ringers  50 mL/hr Intravenous Continuous Ramya Arana MD Stopped (06/21/24 0729)    loratadine  10 mg Oral Daily Brina Guillen MD      melatonin  3 mg Oral HS Ion C Holter, DO      metFORMIN  500 mg Oral BID With Meals HOLLI Galvan      methocarbamol  500 mg Oral Q6H PRN HOLLI Lion      nicotine polacrilex  2 mg Oral Q4H PRN Bora Rosario MD      OLANZapine  5 mg Oral Q4H PRN Max 3/day Ion C Holter, DO      Or    OLANZapine  2.5 mg Intramuscular Q4H PRN Max 3/day Ion C Holter, DO      OLANZapine  5 mg Oral Q3H PRN Max 3/day Ion C Holter, DO      Or    OLANZapine  5 mg Intramuscular Q3H PRN Max 3/day Ion C Holter, DO      OLANZapine  2.5 mg Oral Q4H PRN Max 6/day Jordan C Holter, DO      ondansetron  4 mg Oral Q6H PRN HOLLI Lion      polyethylene  glycol  17 g Oral Daily HOLLI Lion      polyethylene glycol  17 g Oral Daily PRN Jordan C Holter, DO      propranolol  10 mg Oral Q12H KAYLIE HOLLI Lion      senna-docusate sodium  1 tablet Oral Daily PRN Jordan C Holter, DO      senna-docusate sodium  2 tablet Oral BID HOLLI Lion      traZODone  50 mg Oral HS PRN HOLLI Lion      white petrolatum-mineral oil   Topical TID PRN HOLLI Lion         Counseling / Coordination of Care: Total floor / unit time spent today 15 minutes. Greater than 50% of total time was spent with the patient and / or family counseling and / or somewhat receptive to supportive listening and teaching positive coping skills to deal with symptom mangement.     Patient's Rights, confidentiality and exceptions to confidentiality, use of automated medical record, [facility] staff access to medical record, and consent to treatment reviewed.    This note has been dictated and hence there may be problems with punctuation, spelling and formatting and if anyone has any concerns please address them to Dr. Rosario   This note is not shared with patient due to potential for making patient's condition worse by knowing the content of the note.

## 2024-06-24 NOTE — PLAN OF CARE
Problem: Alteration in Thoughts and Perception  Goal: Treatment Goal: Gain control of psychotic behaviors/thinking, reduce/eliminate presenting symptoms and demonstrate improved reality functioning upon discharge  Outcome: Progressing  Goal: Verbalize thoughts and feelings  Description: Interventions:  - Promote a nonjudgmental and trusting relationship with the patient through active listening and therapeutic communication  - Assess patient's level of functioning, behavior and potential for risk  - Engage patient in 1 on 1 interactions  - Encourage patient to express fears, feelings, frustrations, and discuss symptoms    - [place] patient to reality, help patient recognize reality-based thinking   - Administer medications as ordered and assess for potential side effects  - Provide the patient education related to the signs and symptoms of the illness and desired effects of prescribed medications  Outcome: Progressing  Goal: Refrain from acting on delusional thinking/internal stimuli  Description: Interventions:  - Monitor patient closely, per order   - Utilize least restrictive measures   - Set reasonable limits, give positive feedback for acceptable   - Administer medications as ordered and monitor of potential side effects  Outcome: Progressing  Goal: Agree to be compliant with medication regime, as prescribed and report medication side effects  Description: Interventions:  - Offer appropriate PRN medication and supervise ingestion; conduct AIMS, as needed   Outcome: Progressing  Goal: Attend and participate in unit activities, including therapeutic, recreational, and educational groups  Description: Interventions:  -Encourage Visitation and family involvement in care  Outcome: Progressing  Goal: Recognize dysfunctional thoughts, communicate reality-based thoughts at the time of discharge  Description: Interventions:  - Provide medication and psycho-education to assist patient in compliance and developing  insight into his/her illness   Outcome: Progressing  Goal: Complete daily ADLs, including personal hygiene independently, as able  Description: Interventions:  - Observe, teach, and assist patient with ADLS  - Monitor and promote a balance of rest/activity, with adequate nutrition and elimination   Outcome: Progressing     Problem: Ineffective Coping  Goal: Participates in unit activities  Description: Interventions:  - Provide therapeutic environment   - Provide required programming   - Redirect inappropriate behaviors   Outcome: Progressing     Problem: Depression  Goal: Treatment Goal: Demonstrate behavioral control of depressive symptoms, verbalize feelings of improved mood/affect, and adopt new coping skills prior to discharge  Outcome: Progressing  Goal: Verbalize thoughts and feelings  Description: Interventions:  - Assess and re-assess patient's level of risk   - Engage patient in 1:1 interactions, daily, for a minimum of 15 minutes   - Encourage patient to express feelings, fears, frustrations, hopes   Outcome: Progressing  Goal: Refrain from harming self  Description: Interventions:  - Monitor patient closely, per order   - Supervise medication ingestion, monitor effects and side effects   Outcome: Progressing  Goal: Refrain from isolation  Description: Interventions:  - Develop a trusting relationship   - Encourage socialization   Outcome: Progressing  Goal: Refrain from self-neglect  Outcome: Progressing  Goal: Attend and participate in unit activities, including therapeutic, recreational, and educational groups  Description: Interventions:  - Provide therapeutic and educational activities daily, encourage attendance and participation, and document same in the medical record   Outcome: Progressing     Problem: Anxiety  Goal: Anxiety is at manageable level  Description: Interventions:  - Assess and monitor patient's anxiety level.   - Monitor for signs and symptoms (heart palpitations, chest pain,  shortness of breath, headaches, nausea, feeling jumpy, restlessness, irritable, apprehensive).   - Collaborate with interdisciplinary team and initiate plan and interventions as ordered.  - Ryan patient to unit/surroundings  - Explain treatment plan  - Encourage participation in care  - Encourage verbalization of concerns/fears  - Identify coping mechanisms  - Assist in developing anxiety-reducing skills  - Administer/offer alternative therapies  - Limit or eliminate stimulants  Outcome: Progressing     Problem: Alteration in Orientation  Goal: Treatment Goal: Demonstrate a reduction of confusion and improved orientation to person, place, time and/or situation upon discharge, according to optimum baseline/ability  Outcome: Progressing  Goal: Interact with staff daily  Description: Interventions:  - Assess and re-assess patient's level of orientation  - Engage patient in 1 on 1 interactions, daily, for a minimum of 15 minutes   - Establish rapport/trust with patient   Outcome: Progressing  Goal: Express concerns related to confused thinking related to:  Description: Interventions:  - Encourage patient to express feelings, fears, frustrations, hopes  - Assign consistent caregivers   - Ryan/re-orient patient as needed  - Allow comfort items, as appropriate  - Provide visual cues, signs, etc.   Outcome: Progressing  Goal: Allow medical examinations, as recommended  Description: Interventions:  - Provide physical/neurological exams and/or referrals, per provider   Outcome: Progressing  Goal: Cooperate with recommended testing/procedures  Description: Interventions:  - Determine need for ancillary testing  - Observe for mental status changes  - Implement falls/precaution protocol   Outcome: Progressing  Goal: Attend and participate in unit activities, including therapeutic, recreational, and educational groups  Description: Interventions:  - Provide therapeutic and educational activities daily, encourage attendance  and participation, and document same in the medical record   - Provide appropriate opportunities for reminiscence   - Provide a consistent daily routine   - Encourage family contact/visitation   Outcome: Progressing  Goal: Complete daily ADLs, including personal hygiene independently, as able  Description: Interventions:  - Observe, teach, and assist patient with ADLS  Outcome: Progressing     Problem: Electroconvulsive therapy (ECT)  Goal: Treatment Goal: Demonstrate a reduction of confusion and improved orientation to person, place, time and/or situation upon discharge, according to optimum baseline/ability  Outcome: Progressing  Goal: Verbalizes/displays adequate comfort level or baseline comfort level  Description: Interventions:  - Encourage patient to monitor pain and request assistance  - Assess pain using appropriate pain scale  - Administer analgesics based on type and severity of pain and evaluate response  - Implement non-pharmacological measures as appropriate and evaluate response  - Consider cultural and social influences on pain and pain management  - Notify physician/advanced practitioner if interventions unsuccessful or patient reports new pain  Outcome: Progressing  Goal: Achieves stable or improved neurological status  Description: INTERVENTIONS  - Monitor and report changes in neurological status  - Monitor vital signs such as temperature, blood pressure, glucose, and any other labs ordered   - Initiate measures to prevent increased intracranial pressure  - Monitor for seizure activity and implement precautions if appropriate      Outcome: Progressing  Goal: Achieves maximal functionality and self care  Description: INTERVENTIONS  - Monitor swallowing and airway patency with patient fatigue and changes in neurological status  - Encourage and assist patient to increase activity and self care.   - Encourage visually impaired, hearing impaired and aphasic patients to use assistive/communication  devices  Outcome: Progressing  Goal: Maintain or return mobility to safest level of function  Description: INTERVENTIONS:  - Assess patient's ability to carry out ADLs; assess patient's baseline for ADL function and identify physical deficits which impact ability to perform ADLs (bathing, care of mouth/teeth, toileting, grooming, dressing, etc.)  - Assess/evaluate cause of self-care deficits   - Assess range of motion  - Assess patient's mobility  - Assess patient's need for assistive devices and provide as appropriate  - Encourage maximum independence but intervene and supervise when necessary  - Involve family in performance of ADLs  - Assess for home care needs following discharge   - Consider OT consult to assist with ADL evaluation and planning for discharge  - Provide patient education as appropriate  Outcome: Progressing  Goal: Absence of urinary retention  Description: INTERVENTIONS:  - Assess patient’s ability to void and empty bladder  - Monitor I/O  - Bladder scan as needed  - Discuss with physician/AP medications to alleviate retention as needed  - Discuss catheterization for long term situations as appropriate  Outcome: Progressing  Goal: Minimal or absence of nausea and/or vomiting  Description: INTERVENTIONS:  - Administer IV fluids if ordered to ensure adequate hydration  - Maintain NPO status until nausea and vomiting are resolved  - Nasogastric tube if ordered  - Administer ordered antiemetic medications as needed  - Provide nonpharmacologic comfort measures as appropriate  - Advance diet as tolerated, if ordered  - Consider nutrition services referral to assist patient with adequate nutrition and appropriate food choices  Outcome: Progressing  Goal: Maintains adequate nutritional intake  Description: INTERVENTIONS:  - Monitor percentage of each meal consumed  - Identify factors contributing to decreased intake, treat as appropriate  - Assist with meals as needed  - Monitor I&O, weight, and lab  values if indicated  - Obtain nutrition services referral as needed  Outcome: Progressing     Problem: DISCHARGE PLANNING - CARE MANAGEMENT  Goal: Discharge to post-acute care or home with appropriate resources  Description: INTERVENTIONS:  - Conduct assessment to determine patient/family and health care team treatment goals, and need for post-acute services based on payer coverage, community resources, and patient preferences, and barriers to discharge  - Address psychosocial, clinical, and financial barriers to discharge as identified in assessment in conjunction with the patient/family and health care team  - Arrange appropriate level of post-acute services according to patient’s   needs and preference and payer coverage in collaboration with the physician and health care team  - Communicate with and update the patient/family, physician, and health care team regarding progress on the discharge plan  - Arrange appropriate transportation to post-acute venues  Outcome: Progressing

## 2024-06-24 NOTE — PROGRESS NOTES
06/24/24 1032   Team Meeting   Meeting Type Tx Team Meeting   Initial Conference Date 06/24/24   Next Conference Date 07/24/24   Team Members Present   Team Members Present Physician;Nurse;   Physician Team Member Abraham   Nursing Team Member Claudia   Social Work Team Member Jenny ORTEGA   Patient/Family Present   Patient Present Yes   Patient's Family Present No      Pt attended tx plan review. Pt expressed no questions or concerns. Signed updated copy was placed in pt's chart.

## 2024-06-25 PROCEDURE — 99232 SBSQ HOSP IP/OBS MODERATE 35: CPT | Performed by: PSYCHIATRY & NEUROLOGY

## 2024-06-25 RX ADMIN — POLYETHYLENE GLYCOL 3350 17 G: 17 POWDER, FOR SOLUTION ORAL at 08:27

## 2024-06-25 RX ADMIN — ESCITALOPRAM OXALATE 20 MG: 10 TABLET, FILM COATED ORAL at 08:27

## 2024-06-25 RX ADMIN — NICOTINE POLACRILEX 2 MG: 2 GUM, CHEWING ORAL at 21:52

## 2024-06-25 RX ADMIN — LORATADINE 10 MG: 10 TABLET ORAL at 08:29

## 2024-06-25 RX ADMIN — NICOTINE POLACRILEX 2 MG: 2 GUM, CHEWING ORAL at 17:29

## 2024-06-25 RX ADMIN — METFORMIN HYDROCHLORIDE 500 MG: 500 TABLET, FILM COATED ORAL at 17:21

## 2024-06-25 RX ADMIN — PROPRANOLOL HYDROCHLORIDE 10 MG: 10 TABLET ORAL at 21:53

## 2024-06-25 RX ADMIN — CYANOCOBALAMIN TAB 1000 MCG 1000 MCG: 1000 TAB at 08:27

## 2024-06-25 RX ADMIN — NICOTINE POLACRILEX 2 MG: 2 GUM, CHEWING ORAL at 12:51

## 2024-06-25 RX ADMIN — PROPRANOLOL HYDROCHLORIDE 10 MG: 10 TABLET ORAL at 08:29

## 2024-06-25 RX ADMIN — MELATONIN TAB 3 MG 3 MG: 3 TAB at 21:52

## 2024-06-25 RX ADMIN — FLUTICASONE PROPIONATE 1 SPRAY: 50 SPRAY, METERED NASAL at 08:29

## 2024-06-25 RX ADMIN — SENNOSIDES AND DOCUSATE SODIUM 2 TABLET: 8.6; 5 TABLET ORAL at 17:21

## 2024-06-25 RX ADMIN — NICOTINE POLACRILEX 2 MG: 2 GUM, CHEWING ORAL at 08:29

## 2024-06-25 RX ADMIN — ARIPIPRAZOLE 15 MG: 15 TABLET ORAL at 08:28

## 2024-06-25 RX ADMIN — GLYCOPYRROLATE 1 MG: 1 TABLET ORAL at 21:53

## 2024-06-25 RX ADMIN — CLOZAPINE 500 MG: 100 TABLET ORAL at 21:53

## 2024-06-25 RX ADMIN — HYDROCHLOROTHIAZIDE 12.5 MG: 12.5 TABLET ORAL at 08:27

## 2024-06-25 RX ADMIN — GLYCOPYRROLATE 1 MG: 1 TABLET ORAL at 17:26

## 2024-06-25 RX ADMIN — SENNOSIDES AND DOCUSATE SODIUM 2 TABLET: 8.6; 5 TABLET ORAL at 08:27

## 2024-06-25 RX ADMIN — METFORMIN HYDROCHLORIDE 500 MG: 500 TABLET, FILM COATED ORAL at 08:27

## 2024-06-25 RX ADMIN — GLYCOPYRROLATE 1 MG: 1 TABLET ORAL at 08:29

## 2024-06-25 NOTE — NURSING NOTE
Patient is difficult to arouse this morning, does get up and come out for breakfast. Pt is pleasant and cooperative, takes all medications without issue. Pt reports no s/s, voices no concerns.

## 2024-06-25 NOTE — NURSING NOTE
Received pt in bed at change of shift with eyes closed; chest movement noted.  Continues the same thus this far as per q 7 min room checks.   Will continue to monitor behavior, sleeping pattern and any medical issues that may arise.    0546:  Sleeping 7+hrs thus this far.

## 2024-06-25 NOTE — PLAN OF CARE
Problem: Ineffective Coping  Goal: Identifies ineffective coping skills  Outcome: Progressing  Goal: Identifies healthy coping skills  Outcome: Progressing  Goal: Demonstrates healthy coping skills  Outcome: Progressing  Goal: Participates in unit activities  Description: Interventions:  - Provide therapeutic environment   - Provide required programming   - Redirect inappropriate behaviors   Outcome: Progressing    Attended 32/48 groups offered last week.

## 2024-06-25 NOTE — PROGRESS NOTES
06/25/24 0745   Team Meeting   Meeting Type Daily Rounds   Team Members Present   Team Members Present Physician;Nurse;;Other (Discipline and Name)   Patient/Family Present   Patient Present No   Patient's Family Present No     In attendance:  MD Eveline Gamino, HOLLI Taylor, RN  Luisana Alvarado, \[facility] [place] [facility]\""W  Carla Lux, \[facility] [place] [facility]\""W  Irais Mcdowell, M.S.    Groups: 7/9    Pt reports passive SI with no plan. Pt is visible, social and appropriate. Pt has ongoing AH saying they will hurt him and his family. No bx issues noted.

## 2024-06-25 NOTE — PLAN OF CARE
Problem: Alteration in Thoughts and Perception  Goal: Treatment Goal: Gain control of psychotic behaviors/thinking, reduce/eliminate presenting symptoms and demonstrate improved reality functioning upon discharge  Outcome: Progressing  Goal: Verbalize thoughts and feelings  Description: Interventions:  - Promote a nonjudgmental and trusting relationship with the patient through active listening and therapeutic communication  - Assess patient's level of functioning, behavior and potential for risk  - Engage patient in 1 on 1 interactions  - Encourage patient to express fears, feelings, frustrations, and discuss symptoms    - [place] patient to reality, help patient recognize reality-based thinking   - Administer medications as ordered and assess for potential side effects  - Provide the patient education related to the signs and symptoms of the illness and desired effects of prescribed medications  Outcome: Progressing  Goal: Refrain from acting on delusional thinking/internal stimuli  Description: Interventions:  - Monitor patient closely, per order   - Utilize least restrictive measures   - Set reasonable limits, give positive feedback for acceptable   - Administer medications as ordered and monitor of potential side effects  Outcome: Progressing  Goal: Agree to be compliant with medication regime, as prescribed and report medication side effects  Description: Interventions:  - Offer appropriate PRN medication and supervise ingestion; conduct AIMS, as needed   Outcome: Progressing  Goal: Attend and participate in unit activities, including therapeutic, recreational, and educational groups  Description: Interventions:  -Encourage Visitation and family involvement in care  Outcome: Progressing  Goal: Complete daily ADLs, including personal hygiene independently, as able  Description: Interventions:  - Observe, teach, and assist patient with ADLS  - Monitor and promote a balance of rest/activity, with adequate  nutrition and elimination   Outcome: Progressing     Problem: Ineffective Coping  Goal: Demonstrates healthy coping skills  Outcome: Progressing  Goal: Participates in unit activities  Description: Interventions:  - Provide therapeutic environment   - Provide required programming   - Redirect inappropriate behaviors   Outcome: Progressing     Problem: Depression  Goal: Verbalize thoughts and feelings  Description: Interventions:  - Assess and re-assess patient's level of risk   - Engage patient in 1:1 interactions, daily, for a minimum of 15 minutes   - Encourage patient to express feelings, fears, frustrations, hopes   Outcome: Progressing  Goal: Refrain from harming self  Description: Interventions:  - Monitor patient closely, per order   - Supervise medication ingestion, monitor effects and side effects   Outcome: Progressing  Goal: Refrain from isolation  Description: Interventions:  - Develop a trusting relationship   - Encourage socialization   Outcome: Progressing  Goal: Refrain from self-neglect  Outcome: Progressing  Goal: Attend and participate in unit activities, including therapeutic, recreational, and educational groups  Description: Interventions:  - Provide therapeutic and educational activities daily, encourage attendance and participation, and document same in the medical record   Outcome: Progressing  Goal: Complete daily ADLs, including personal hygiene independently, as able  Description: Interventions:  - Observe, teach, and assist patient with ADLS  -  Monitor and promote a balance of rest/activity, with adequate nutrition and elimination   Outcome: Progressing     Problem: Anxiety  Goal: Anxiety is at manageable level  Description: Interventions:  - Assess and monitor patient's anxiety level.   - Monitor for signs and symptoms (heart palpitations, chest pain, shortness of breath, headaches, nausea, feeling jumpy, restlessness, irritable, apprehensive).   - Collaborate with interdisciplinary  team and initiate plan and interventions as ordered.  - [place] patient to unit/surroundings  - Explain treatment plan  - Encourage participation in care  - Encourage verbalization of concerns/fears  - Identify coping mechanisms  - Assist in developing anxiety-reducing skills  - Administer/offer alternative therapies  - Limit or eliminate stimulants  Outcome: Progressing     Problem: Alteration in Orientation  Goal: Treatment Goal: Demonstrate a reduction of confusion and improved orientation to person, place, time and/or situation upon discharge, according to optimum baseline/ability  Outcome: Progressing  Goal: Express concerns related to confused thinking related to:  Description: Interventions:  - Encourage patient to express feelings, fears, frustrations, hopes  - Assign consistent caregivers   - [place]/re-orient patient as needed  - Allow comfort items, as appropriate  - Provide visual cues, signs, etc.   Outcome: Progressing  Goal: Cooperate with recommended testing/procedures  Description: Interventions:  - Determine need for ancillary testing  - Observe for mental status changes  - Implement falls/precaution protocol   Outcome: Progressing     Problem: Electroconvulsive therapy (ECT)  Goal: Treatment Goal: Demonstrate a reduction of confusion and improved orientation to person, place, time and/or situation upon discharge, according to optimum baseline/ability  Outcome: Progressing  Goal: Verbalizes/displays adequate comfort level or baseline comfort level  Description: Interventions:  - Encourage patient to monitor pain and request assistance  - Assess pain using appropriate pain scale  - Administer analgesics based on type and severity of pain and evaluate response  - Implement non-pharmacological measures as appropriate and evaluate response  - Consider cultural and social influences on pain and pain management  - Notify physician/advanced practitioner if interventions unsuccessful or patient reports new  pain  Outcome: Progressing  Goal: Achieves stable or improved neurological status  Description: INTERVENTIONS  - Monitor and report changes in neurological status  - Monitor vital signs such as temperature, blood pressure, glucose, and any other labs ordered   - Initiate measures to prevent increased intracranial pressure  - Monitor for seizure activity and implement precautions if appropriate      Outcome: Progressing  Goal: Maintain or return mobility to safest level of function  Description: INTERVENTIONS:  - Assess patient's ability to carry out ADLs; assess patient's baseline for ADL function and identify physical deficits which impact ability to perform ADLs (bathing, care of mouth/teeth, toileting, grooming, dressing, etc.)  - Assess/evaluate cause of self-care deficits   - Assess range of motion  - Assess patient's mobility  - Assess patient's need for assistive devices and provide as appropriate  - Encourage maximum independence but intervene and supervise when necessary  - Involve family in performance of ADLs  - Assess for home care needs following discharge   - Consider OT consult to assist with ADL evaluation and planning for discharge  - Provide patient education as appropriate  Outcome: Progressing  Goal: Absence of urinary retention  Description: INTERVENTIONS:  - Assess patient’s ability to void and empty bladder  - Monitor I/O  - Bladder scan as needed  - Discuss with physician/AP medications to alleviate retention as needed  - Discuss catheterization for long term situations as appropriate  Outcome: Progressing  Goal: Maintains adequate nutritional intake  Description: INTERVENTIONS:  - Monitor percentage of each meal consumed  - Identify factors contributing to decreased intake, treat as appropriate  - Assist with meals as needed  - Monitor I&O, weight, and lab values if indicated  - Obtain nutrition services referral as needed  Outcome: Progressing     Problem: DISCHARGE PLANNING - CARE  MANAGEMENT  Goal: Discharge to post-acute care or home with appropriate resources  Description: INTERVENTIONS:  - Conduct assessment to determine patient/family and health care team treatment goals, and need for post-acute services based on payer coverage, community resources, and patient preferences, and barriers to discharge  - Address psychosocial, clinical, and financial barriers to discharge as identified in assessment in conjunction with the patient/family and health care team  - Arrange appropriate level of post-acute services according to patient’s   needs and preference and payer coverage in collaboration with the physician and health care team  - Communicate with and update the patient/family, physician, and health care team regarding progress on the discharge plan  - Arrange appropriate transportation to post-acute venues  Outcome: Progressing

## 2024-06-25 NOTE — PROGRESS NOTES
Progress Note - Behavioral Health   Alberto Berumen 28 y.o. male MRN: [id number]  Unit/Bed#: [facility] 101-02 Encounter: [id number]    Assessment & Plan   Principal Problem:    Schizoaffective disorder, bipolar type ([facility])  Active Problems:    GERD (gastroesophageal reflux disease)    Medical clearance for psychiatric admission    Tobacco abuse    T wave inversion in EKG    Chronic idiopathic constipation    Confluent and reticulate papillomatosis    Primary hypertension    Elevated hemoglobin A1c    Bilateral lower extremity edema      Behavior over the last 24 hours:  unchanged  Sleep: normal  Appetite: normal  Medication side effects: Yes. The patient experiences drooling. He states that some days are worse/ better than others. His symptoms have improved since taking glycopyrrolate (Robinul), but are still present.   ROS: no complaints    Subjective: The patient is a 28 year old male with schizoaffective disorder bipolar type. The patient endorses continued auditory hallucinations. He reports voices that are threatening to kill him and his family. He denies changes today in their severity. He additionally endorses paranoid, persecutory delusions that someone is going to kill him and his family. The patient reports that his depression today is a 6-7/10 in severity, which is mildly improved from yesterday which he reports was an 8/10. The patient states that he is feeling anxious and sleepy today. He says that he is okay. He denies current suicidal ideation or homicidal ideation, but reports that yesterday he did have thoughts of harming himself that were passive, without plans.   Nursing reports that the patient has been visible, social, and pleasant yesterday. He had his treatment team meeting yesterday, in which the patient reported passive suicidal ideation without plans. Nursing reports that this morning the patient was difficult to arouse, but once awake he has been pleasant. The patient did not have any  behavioral concerns and attended 7/9 groups yesterday. His next labs are scheduled for 6/27 and his next ECT session is scheduled for 7/5.     Mental Status Evaluation:  Appearance:  casually dressed, older than stated age, and overweight. Appropriate eye contact. Dressed in hospital attire and a black hooded sweatshirt.    Behavior:  Pleasant, cooperative, slow in movements.    Speech:  normal pitch and normal volume   Mood:  anxious   Affect:  flat, mood-congruent, and stable   Thought Process:  normal and logical   Associations: intact associations   Thought Content:  delusions  persecutory, paranoid delusions that someone is going to kill him and his family. Denies any other experiences that may be classified as delusions.    Perceptual Disturbances: Auditory hallucinations without commands. Reports voices that are threatening to kill him and his family. Denies any other hallucinations or illusions.    Risk Potential: Suicidal Ideations none  Homicidal Ideations none  Potential for Aggression No   Sensorium:  person, place, time/date, and situation   Memory:  recent and remote memory grossly intact   Consciousness:  alert and awake    Attention: attention span and concentration were age appropriate   Intelligence: Average intelligence   Impulse control: Does not exhibit signs of poor impulse control    Insight:  age appropriate and good   Judgment: age appropriate and good   Gait/Station: slow   Motor Activity: no abnormal movements     Progress Toward Goals: The patient reports continued symptoms of auditory hallucinations of voices that are saying they will kill him and his family. He additionally endorses paranoid, persecutory delusions that someone is out to get him and his family and kill them. He rated his depression to be a 6-7/10 in severity today, a mild improvement from yesterday which he rated an 8/10. The patient denies current suicidal or homicidal ideation or thoughts of self harm today. He did  experience passive suicidal ideation without plans yesterday. The patient believes that his medications are okay and that the ECT sessions are helping him.     Recommended Treatment: Continue with group therapy, milieu therapy and occupational therapy.      Risks, benefits and possible side effects of Medications:   Risks, benefits, and possible side effects of medications explained to patient and patient verbalizes understanding.      Medications: all current active meds have been reviewed.      Counseling / Coordination of Care  Total floor / unit time spent today 10 minutes. Greater than 50% of total time was spent with the patient and / or family counseling and / or coordination of care.

## 2024-06-25 NOTE — NURSING NOTE
Alberto reported feeling depressed and anxious.  Also reported hearing voices telling him that they are going to kill him.  Requested gum throughout the shift.  Med and meal compliant, consumed 100% x 3.    Watched tv with peers.  No behavioral issues.

## 2024-06-26 PROCEDURE — 99232 SBSQ HOSP IP/OBS MODERATE 35: CPT | Performed by: PSYCHIATRY & NEUROLOGY

## 2024-06-26 RX ADMIN — GLYCOPYRROLATE 1 MG: 1 TABLET ORAL at 17:19

## 2024-06-26 RX ADMIN — NICOTINE POLACRILEX 2 MG: 2 GUM, CHEWING ORAL at 21:50

## 2024-06-26 RX ADMIN — AMLODIPINE BESYLATE 5 MG: 5 TABLET ORAL at 08:51

## 2024-06-26 RX ADMIN — PROPRANOLOL HYDROCHLORIDE 10 MG: 10 TABLET ORAL at 21:16

## 2024-06-26 RX ADMIN — MELATONIN TAB 3 MG 3 MG: 3 TAB at 21:17

## 2024-06-26 RX ADMIN — SENNOSIDES AND DOCUSATE SODIUM 2 TABLET: 8.6; 5 TABLET ORAL at 08:51

## 2024-06-26 RX ADMIN — HYDROCHLOROTHIAZIDE 12.5 MG: 12.5 TABLET ORAL at 08:51

## 2024-06-26 RX ADMIN — NICOTINE POLACRILEX 2 MG: 2 GUM, CHEWING ORAL at 17:32

## 2024-06-26 RX ADMIN — LORATADINE 10 MG: 10 TABLET ORAL at 08:51

## 2024-06-26 RX ADMIN — ESCITALOPRAM OXALATE 20 MG: 10 TABLET, FILM COATED ORAL at 08:51

## 2024-06-26 RX ADMIN — SENNOSIDES AND DOCUSATE SODIUM 2 TABLET: 8.6; 5 TABLET ORAL at 17:19

## 2024-06-26 RX ADMIN — POLYETHYLENE GLYCOL 3350 17 G: 17 POWDER, FOR SOLUTION ORAL at 08:52

## 2024-06-26 RX ADMIN — PROPRANOLOL HYDROCHLORIDE 10 MG: 10 TABLET ORAL at 08:51

## 2024-06-26 RX ADMIN — CLOZAPINE 500 MG: 100 TABLET ORAL at 21:17

## 2024-06-26 RX ADMIN — FLUTICASONE PROPIONATE 1 SPRAY: 50 SPRAY, METERED NASAL at 08:52

## 2024-06-26 RX ADMIN — GLYCOPYRROLATE 1 MG: 1 TABLET ORAL at 08:51

## 2024-06-26 RX ADMIN — ARIPIPRAZOLE 15 MG: 15 TABLET ORAL at 08:51

## 2024-06-26 RX ADMIN — GLYCOPYRROLATE 1 MG: 1 TABLET ORAL at 21:17

## 2024-06-26 RX ADMIN — METFORMIN HYDROCHLORIDE 500 MG: 500 TABLET, FILM COATED ORAL at 08:51

## 2024-06-26 RX ADMIN — NICOTINE POLACRILEX 2 MG: 2 GUM, CHEWING ORAL at 09:17

## 2024-06-26 RX ADMIN — METFORMIN HYDROCHLORIDE 500 MG: 500 TABLET, FILM COATED ORAL at 17:19

## 2024-06-26 RX ADMIN — CYANOCOBALAMIN TAB 1000 MCG 1000 MCG: 1000 TAB at 08:51

## 2024-06-26 NOTE — SOCIAL WORK
BRANDY placed call to pt's sister Marleny to attempt to assist with scheduling virtual visit. Phone is currently disconnected.   BRANDY placed call to pt's aunt Hanh and left  requesting a call back.     Hanh returned call. BRANDY provided update. Virtual visit set up with aunt for pt Friday 6:30pm

## 2024-06-26 NOTE — SOCIAL WORK
SW met 1:1 with pt  Pt reports continuing to feel down/depressed and voices are ongoing.   Pt reports no other concerns and he is using his coping skills.   Pt wants virtual visit but reports continuing to forget to find out her availability.

## 2024-06-26 NOTE — NURSING NOTE
Alberto went to bed fairly early last night and was resting well on Q 7 minute rounds and was undisturbed and appeared to be comfortable and in no apparent distress when observed by staff throughout the night with no early morning awakening noted.

## 2024-06-26 NOTE — NURSING NOTE
Visible with peers all evening. Patient cooperative with staff.  Behaviors controlled and appropriate.. Medication compliant as well. No prn medication requested or required.

## 2024-06-26 NOTE — PROGRESS NOTES
06/26/24 0737   Team Meeting   Meeting Type Daily Rounds   Team Members Present   Team Members Present Physician;Nurse;;Other (Discipline and Name)   Patient/Family Present   Patient Present No   Patient's Family Present No     In attendance:  MD Akira Gamino, HOLLI Garibay, RN  Luisana Alvarado, hospitalsW  Carla Lux, hospitalsW  Irais Mcdowell M.S.    Groups: 9/10    Pt due for labs tomorrow. No changes; no bx issues. Pt continues to present with flat, depressive affect and reports ongoing auditory hallucinations.

## 2024-06-26 NOTE — PROGRESS NOTES
Psychiatry Progress Note [facility]    Alberto Berumen 28 y.o. male MRN: [id number]  Unit/Bed#: [facility] 108-02 Encounter: [id number]  Code Status: Level 1 - Full Code    PCP: Joce Juan MD    Date of Admission:  3/29/2024 2008   Date of Service:  06/26/24    Patient Active Problem List   Diagnosis    GERD (gastroesophageal reflux disease)    Medical clearance for psychiatric admission    Schizoaffective disorder, bipolar type (HCC)    Tobacco abuse    T wave inversion in EKG    Syringoma    Chronic idiopathic constipation    Vitamin B 12 deficiency    Vitamin D deficiency    Confluent and reticulate papillomatosis    Class 2 obesity in adult    Primary hypertension    Elevated hemoglobin A1c    Bilateral lower extremity edema     Review of systems: Unremarkable  Psychiatric diagnosis: Schizoaffective bipolar  assessment  Overall Status: No significant changes hearing same voices as before  Certification Statement: The patient will continue to require additional inpatient hospital stay due to ongoing voices that are threatening to kill him and his family lack of response to medications and ECT so far.     Medications: Clozapine 500 mg at bedtime, propranolol 10 mg every 12 hours as as needed for anxiety, Abilify 15 mg mg at bedtime Lexapro 20 mg once a day, atropine eyedrops sublingual for drooling of saliva and senna 2 tablets twice a day for constipation  All medications reviewed and recommend they be continued for symptom management  Side effects from treatment: None reported  Medication changes   None today  Medication education   Risks side effects benefits and precautions of medications discussed with patient and he did verbalize an understanding about risks for metabolic syndrome from being on neuroleptics and is form tardive dyskinesia etc. and special precautions about being on clozapine   Understanding of medications: Has some understanding   Justification for dual anti-psychotics:  Not applicable    Non-pharmacological treatments  Continue with individual, group, milieu and occupational therapy using recovery principles and psycho-education about accepting illness and the need for treatment.   to contact aunt and explore ACT team referral which he never had  ECT to be continued  q 2 weekly for maintenance x 6  Refuses to see a dietician and agrees to do more exercises as he is about 100 lbs overweight   Prolactin level high so Risperdal replaced with Abilify    Safety  Safety and communication plan established to target dynamic risk factors discussed above.    Discharge Plan   Back to his aunt with an ACT team    Interval Progress   Anxious in the waiting for maintenance ECT every 2 weeks due next week as he claims to have helped decrease the voices.  Still hears the same voices threatening to kill him and his family and appears fearful and anxious and claims it makes him feel depressed and almost suicidal but without any intent or plans.  Still with a flat affect with no good mood reactivity during hospital clothing and not aggressive or agitated with no need for behavioral PRNs.  Superficial preoccupied guarded claiming to hear voices that makes him feel depressed and causes him to get passive death wishes but able to contract for safety compliant with medications  I Acceptance by patient: Accepting  Hopefulness in recovery: Living with his aunt and sister  Involved in reintegration process: Talking to his sister and aunt  Trusting in relationship with psychiatrist: Appears to trust  Sleep: Good  Appetite: Good  Compliance with Medications: Good  Group attendance: Some 3/10  Significant events: Improving but still with same threatening voices    Mental Status Exam  Appearance: age appropriate, improved grooming, looks older than stated age, overweight, with hair in dreadlocks casually dressed with a clean shaven face, fairly groomed with good eye contact dressed in hospital  clothing walking around the unit somewhat with constricted affect  behavior: cooperative, mildly anxious, evasive, gesturing, slow responses.   speech: normal rate, normal volume, normal pitch  Mood: dysphoric, anxious, continues to report feeling good with the ECT   affect: constricted, inappropriate, mood-congruent depressed sad with low self-esteem  Thought Process: organized, logical, coherent, goal directed, linear, decreased rate of thoughts, slowing of thoughts, negative thinking, impaired abstract reasoning, concrete  Thought Content: paranoid ideation, some paranoia, grandiose ideas, intrusive thoughts, preoccupied, chronic, continues to report paranoia about people threatening to kill him and his family because of the voices.  He believes ECT has helped so that he is not much in his head.  No other delusions elicited.  No current suicidal or homicidal thoughts and no plans verbalized but today reports having passive death wishes because of voices with no plans and able to CFS and it has not changed yet  .  No phobias obsessions compulsions or distorted body perceptions reported.  Preoccupied with wanting to get ECTs more often despite reminding him that it may impair his memory and finally he agreed to such as it is to every 2 weeks  Perceptual Disturbances: Still reporting some threatening voices as usual not commanding  Hx Risk Factors: chronic psychiatric problems, chronic anxiety symptoms, chronic psychotic symptoms,    Sensorium: Oriented x 3 spheres and situation  Cognition: recent and remote memory grossly intact  Consciousness: alert and awake  Attention: attention span and concentration are age appropriate  Intellect: appears to be of average intelligence  Insight: intact  Judgement: intact  Motor Activity: no abnormal movements     Vitals  Temp:  [97.6 °F (36.4 °C)-97.9 °F (36.6 °C)] 97.9 °F (36.6 °C)  HR:  [] 105  Resp:  [18] 18  BP: (113-145)/(69-94) 134/83  SpO2:  [98 %-99 %] 98 %  No  intake or output data in the 24 hours ending 06/26/24 7266              Lab Results: All Labs For Current Hospital Admission Reviewed     Current Facility-Administered Medications   Medication Dose Route Frequency Provider Last Rate    acetaminophen  650 mg Oral Q4H PRN Jordan C Holter, DO      acetaminophen  650 mg Oral Q6H PRN HOLLI Lion      aluminum-magnesium hydroxide-simethicone  30 mL Oral Q4H PRN Jordan C Holter,       amLODIPine  5 mg Oral Daily HOLLI Lion      ARIPiprazole  15 mg Oral Daily Bora Rosario MD      Artificial Tears  1 drop Both Eyes Q3H PRN Jordan C Holter,       atropine  1 drop Sublingual Daily PRN HOLLI Lion      haloperidol lactate  2.5 mg Intramuscular Q4H PRN Max 4/day HOLLI Lion      And    LORazepam  1 mg Intramuscular Q4H PRN Max 4/day HOLLI Lion      And    benztropine  0.5 mg Intramuscular Q4H PRN Max 4/day HOLLI Lion      benztropine  1 mg Intramuscular Q4H PRN Max 6/day Jordan C Holter, DO      haloperidol lactate  5 mg Intramuscular Q4H PRN Max 4/day HOLLI Lion      And    LORazepam  2 mg Intramuscular Q4H PRN Max 4/day HOLLI Lion      And    benztropine  1 mg Intramuscular Q4H PRN Max 4/day HOLLI Lion      benztropine  1 mg Oral Q4H PRN Max 6/day HOLLI Lion      benztropine  1 mg Oral Q4H PRN Max 6/day Jordan C Holter, DO      bisacodyl  10 mg Rectal Daily PRN HOLLI Lion      cloZAPine  500 mg Oral HS Bora Rosario MD      cyanocobalamin  1,000 mcg Oral Daily HOLLI Galvan      hydrOXYzine HCL  50 mg Oral Q6H PRN Max 4/day Jordan C Holter, DO      Or    diphenhydrAMINE  50 mg Intramuscular Q6H PRN Jordan C Holter, DO      hydrOXYzine HCL  50 mg Oral Q6H PRN Max 4/day HOLLI Lion      Or    diphenhydrAMINE  50 mg Intramuscular Q6H PRN HOLLI Lion      diphenhydrAMINE-zinc acetate   Topical BID PRN HOLLI Lion      escitalopram  20 mg Oral Daily Eveline  HOLLI Hunt      fluticasone  1 spray Each Nare Daily Brina Guillen MD      glycopyrrolate  1 mg Oral TID Bora Rosario MD      haloperidol  1 mg Oral Q6H PRN HOLLI Lion      haloperidol  2.5 mg Oral Q4H PRN Max 4/day HOLLI Lion      haloperidol  5 mg Oral Q4H PRN Max 4/day HOLLI Lion      hydroCHLOROthiazide  12.5 mg Oral Daily HOLLI Galvan      hydrocortisone   Topical 4x Daily PRN HOLLI Lion      hydrOXYzine HCL  100 mg Oral Q6H PRN Max 4/day Ion C Holter, DO      Or    LORazepam  2 mg Intramuscular Q6H PRN Ion C Holter, DO      hydrOXYzine HCL  100 mg Oral Q6H PRN Max 4/day HOLLI Lion      Or    LORazepam  2 mg Intramuscular Q6H PRN HOLLI Lion      hydrOXYzine HCL  25 mg Oral Q6H PRN Max 4/day Ion C Holter, DO      ibuprofen  600 mg Oral Q8H PRN HOLLI Lion      lactated ringers  75 mL/hr Intravenous Continuous Anjel Arriaza CRNA Stopped (06/21/24 0729)    lactated ringers  50 mL/hr Intravenous Continuous Ramya Arana MD Stopped (06/21/24 0729)    lactated ringers  50 mL/hr Intravenous Continuous Ramya Arana MD Stopped (06/21/24 0729)    loratadine  10 mg Oral Daily Brina Guillen MD      melatonin  3 mg Oral HS Ion C Holter, DO      metFORMIN  500 mg Oral BID With Meals HOLLI Galvan      methocarbamol  500 mg Oral Q6H PRN HOLLI Lion      nicotine polacrilex  2 mg Oral Q4H PRN Bora Rosario MD      OLANZapine  5 mg Oral Q4H PRN Max 3/day Ion C Holter, DO      Or    OLANZapine  2.5 mg Intramuscular Q4H PRN Max 3/day Ion C Holter, DO      OLANZapine  5 mg Oral Q3H PRN Max 3/day Ion C Holter, DO      Or    OLANZapine  5 mg Intramuscular Q3H PRN Max 3/day Ion C Holter, DO      OLANZapine  2.5 mg Oral Q4H PRN Max 6/day Ion C Holter, DO      ondansetron  4 mg Oral Q6H PRN HOLLI Lion      polyethylene glycol  17 g Oral Daily HOLLI Lion      polyethylene glycol  17 g Oral  Daily PRN Jordan C Holter, DO      propranolol  10 mg Oral Q12H [facility] HOLLI Lion      senna-docusate sodium  1 tablet Oral Daily PRN Jordan C Holter, DO      senna-docusate sodium  2 tablet Oral BID HOLLI Lion      traZODone  50 mg Oral HS PRN HOLLI Lion      white petrolatum-mineral oil   Topical TID PRN HOLLI Lion         Counseling / Coordination of Care: Total floor / unit time spent today 15 minutes. Greater than 50% of total time was spent with the patient and / or family counseling and / or somewhat receptive to supportive listening and teaching positive coping skills to deal with symptom mangement.     Patient's Rights, confidentiality and exceptions to confidentiality, use of automated medical record, Behavioral Health Services staff access to medical record, and consent to treatment reviewed.    This note has been dictated and hence there may be problems with punctuation, spelling and formatting and if anyone has any concerns please address them to Dr. Rosario   This note is not shared with patient due to potential for making patient's condition worse by knowing the content of the note.

## 2024-06-27 PROCEDURE — 99232 SBSQ HOSP IP/OBS MODERATE 35: CPT

## 2024-06-27 RX ADMIN — GLYCOPYRROLATE 1 MG: 1 TABLET ORAL at 16:48

## 2024-06-27 RX ADMIN — SENNOSIDES AND DOCUSATE SODIUM 2 TABLET: 8.6; 5 TABLET ORAL at 17:36

## 2024-06-27 RX ADMIN — MELATONIN TAB 3 MG 3 MG: 3 TAB at 21:25

## 2024-06-27 RX ADMIN — METFORMIN HYDROCHLORIDE 500 MG: 500 TABLET, FILM COATED ORAL at 08:36

## 2024-06-27 RX ADMIN — PROPRANOLOL HYDROCHLORIDE 10 MG: 10 TABLET ORAL at 21:25

## 2024-06-27 RX ADMIN — SENNOSIDES AND DOCUSATE SODIUM 2 TABLET: 8.6; 5 TABLET ORAL at 08:36

## 2024-06-27 RX ADMIN — CYANOCOBALAMIN TAB 1000 MCG 1000 MCG: 1000 TAB at 08:36

## 2024-06-27 RX ADMIN — NICOTINE POLACRILEX 2 MG: 2 GUM, CHEWING ORAL at 21:31

## 2024-06-27 RX ADMIN — GLYCOPYRROLATE 1 MG: 1 TABLET ORAL at 08:36

## 2024-06-27 RX ADMIN — GLYCOPYRROLATE 1 MG: 1 TABLET ORAL at 21:25

## 2024-06-27 RX ADMIN — NICOTINE POLACRILEX 2 MG: 2 GUM, CHEWING ORAL at 12:49

## 2024-06-27 RX ADMIN — POLYETHYLENE GLYCOL 3350 17 G: 17 POWDER, FOR SOLUTION ORAL at 08:36

## 2024-06-27 RX ADMIN — LORATADINE 10 MG: 10 TABLET ORAL at 08:36

## 2024-06-27 RX ADMIN — CLOZAPINE 500 MG: 100 TABLET ORAL at 21:25

## 2024-06-27 RX ADMIN — NICOTINE POLACRILEX 2 MG: 2 GUM, CHEWING ORAL at 08:36

## 2024-06-27 RX ADMIN — HYDROCHLOROTHIAZIDE 12.5 MG: 12.5 TABLET ORAL at 08:36

## 2024-06-27 RX ADMIN — ESCITALOPRAM OXALATE 20 MG: 10 TABLET, FILM COATED ORAL at 08:36

## 2024-06-27 RX ADMIN — FLUTICASONE PROPIONATE 1 SPRAY: 50 SPRAY, METERED NASAL at 08:36

## 2024-06-27 RX ADMIN — AMLODIPINE BESYLATE 5 MG: 5 TABLET ORAL at 08:36

## 2024-06-27 RX ADMIN — METFORMIN HYDROCHLORIDE 500 MG: 500 TABLET, FILM COATED ORAL at 16:48

## 2024-06-27 RX ADMIN — ARIPIPRAZOLE 15 MG: 15 TABLET ORAL at 08:36

## 2024-06-27 RX ADMIN — PROPRANOLOL HYDROCHLORIDE 10 MG: 10 TABLET ORAL at 08:36

## 2024-06-27 RX ADMIN — NICOTINE POLACRILEX 2 MG: 2 GUM, CHEWING ORAL at 17:36

## 2024-06-27 NOTE — PROGRESS NOTES
"Progress Note - Behavioral Health   Alberto Berumen 28 y.o. male MRN: [id number]  Unit/Bed#: [facility] 108-02 Encounter: [id number]      Assessment & Plan   Principal Problem:    Schizoaffective disorder, bipolar type (HCC)  Active Problems:    GERD (gastroesophageal reflux disease)    Medical clearance for psychiatric admission    Tobacco abuse    T wave inversion in EKG    Chronic idiopathic constipation    Confluent and reticulate papillomatosis    Primary hypertension    Elevated hemoglobin A1c    Bilateral lower extremity edema      Subjective:  Patient was seen today for continuation of care, records reviewed and patient was discussed with the morning case review team.  Per staff, Alberto went to 7/8 groups yesterday.  He was irritable yesterday.  He continues to report AH.      Alberto was seen today for psychiatric follow-up.  On assessment today, Alberto was seen resting in bed.  He was reports feeling \"tired\" this morning.  He was scant and minimally engaged with the interview but did answer questions appropriately.  He continues to endorse ongoing depression.  He reports ongoing AH that threaten to \"kill family\".  He has been sleeping well at night and his appetite is good as well.  He has no other concerns today and does plan on attending groups.     Alberto denies acute suicidal/self-harm ideation/intent/plan upon direct inquiry at this time.  Alberto remains behaviorally controlled with no agitation or aggression noted on exam or in report.  Alberto also denies HI/VH.  He does present as internally preoccupied.  Alberto remains adherent to his current psychotropic medication regimen and denies any side effects from medications, as well as none noted on exam.    Recommended Treatment: Treatment plan and medication changes discussed and per the attending physician the plan is:    1.Continue with group therapy, milieu therapy and occupational therapy  2.Behavioral Health checks every 7 minutes  3.Continue " frequent safety checks and vitals per unit protocol  4.Continue with SLIM medical management as indicated  5.Continue with current medication regimen: Clozapine 500 mg PO at HS (last ANC 6.14, WBC 12.26 on 6/20/2024 last Clozapine level 458 on 6/13/2024 Clozapine monitoring labs to be drawn again tonight),  Abilify 15mg PO daily,  propranolol 10 mg Po q 12 hours, Lexapro 20 mg PO daily, melatonin 3mg PO at HS.  Alberto requires use of 2 antipsychotics secondary to failure of monotherapy and drug resistant schizoaffective disorder.   6.Will review labs in the a.m.  7.Disposition Planning: Discharge planning and efforts remain ongoing    Vitals:  Vitals:    06/27/24 0819   BP: 136/85   Pulse: 96   Resp: 18   Temp: 97.8 °F (36.6 °C)   SpO2: 100%       Laboratory Results:  I have personally reviewed all pertinent laboratory/tests results.  Most Recent Labs:   Lab Results   Component Value Date    WBC 12.26 (H) 06/20/2024    RBC 5.29 06/20/2024    HGB 12.5 06/20/2024    HCT 42.1 06/20/2024     06/20/2024    RDW 14.4 06/20/2024    NEUTROABS 6.14 06/20/2024    SODIUM 136 06/03/2024    K 3.8 06/03/2024    CL 96 06/03/2024    CO2 29 06/03/2024    BUN 10 06/03/2024    CREATININE 1.00 06/03/2024    GLUC 97 06/03/2024    GLUF 103 (H) 05/29/2024    CALCIUM 10.1 06/03/2024    AST 33 06/03/2024    ALT 72 (H) 06/03/2024    ALKPHOS 80 06/03/2024    TP 8.5 (H) 06/03/2024    ALB 4.7 06/03/2024    TBILI 0.30 06/03/2024    CHOLESTEROL 156 03/14/2024    HDL 44 03/14/2024    TRIG 133 03/14/2024    LDLCALC 85 03/14/2024    NONHDLC 112 03/14/2024    LITHIUM 0.61 01/09/2024    IFU7DBSYFRBL 1.062 11/15/2023    HGBA1C 5.0 04/01/2024    EAG 97 04/01/2024       Psychiatric Review of Systems:  Behavior over the last 24 hours:  unchanged.   Sleep: increased  Appetite: adequate  Medication side effects: none observed   ROS: no complaints, denies shortness of breath or chest pain and all other systems are negative for acute changes    Mental  "Status Evaluation:    Appearance:  disheveled, marginal hygiene, blankets covering mouth   Behavior:  guarded   Speech:  scant, delayed, soft   Mood:  dysphoric   Affect:  blunted   Thought Process:  concrete, poverty of thought   Associations: concrete associations   Thought Content:  paranoid ideation   Perceptual Disturbances: Auditory hallucinations \"kill family\", denies VH, appears internally preoccupied.    Risk Potential: Suicidal ideation - None at present  Homicidal ideation - None at present  Potential for aggression - Not at present   Sensorium:  oriented to person, place, and situation   Memory:  recent memory intact   Consciousness:  alert and awake   Attention/Concentration: attention span and concentration appear shorter than expected for age   Insight:  limited   Judgment: limited   Gait/Station: in bed   Motor Activity: no abnormal movements     Progress Toward Goals:   Alberto is progressing towards goals of inpatient psychiatric treatment by continued medication compliance and is attending therapeutic modalities on the milieu. However, the patient continues to require inpatient psychiatric hospitalization for continued medication management and titration to optimize symptom reduction, improve sleep hygiene, and demonstrate adequate self-care.    Risk of Harm to Self:   Nursing Suicide Risk Assessment Last 24 hours: C-SSRS Risk (Since Last Contact)  Calculated C-SSRS Risk Score (Since Last Contact): No Risk Indicated  Current Specific Risk Factors include: diagnosis of mood disorder  Protective Factors: no current suicidal ideation, ability to communicate with staff on the unit, improved psychotic symptoms, responds to redirection  Based on today's assessment, Alberto presents the following risk of harm to self: minimal    Risk of Harm to Others:  Nursing Homicide Risk Assessment: Violence Risk to Others: Denies within past 6 months  Current Specific Risk Factors include: current psychotic " symptoms, social difficulties  Protective Factors: no current homicidal ideation, able to communicate with staff on the unit, improved impulse control, psychotic symptoms are less prominent, compliant with medications on the unit as ordered, compliant with unit milieu, follows staff redirection  Based on today's assessment, Alberto presents the following risk of harm to others: low    The following interventions are recommended: behavioral checks every 7 minutes, continued hospitalization on locked unit        Behavioral Health Medications: all current active meds have been reviewed and continue current psychiatric medications.  Current Facility-Administered Medications   Medication Dose Route Frequency Provider Last Rate    acetaminophen  650 mg Oral Q4H PRN Jordan C Holter, DO      acetaminophen  650 mg Oral Q6H PRN HOLLI Lion      aluminum-magnesium hydroxide-simethicone  30 mL Oral Q4H PRN Jordan C Holter,       amLODIPine  5 mg Oral Daily HOLLI Lion      ARIPiprazole  15 mg Oral Daily Bora Rosario MD      Artificial Tears  1 drop Both Eyes Q3H PRN Jordan C Holter, DO      atropine  1 drop Sublingual Daily PRN HOLLI Lion      haloperidol lactate  2.5 mg Intramuscular Q4H PRN Max 4/day STEVE LionNP      And    LORazepam  1 mg Intramuscular Q4H PRN Max 4/day HOLLI Lion      And    benztropine  0.5 mg Intramuscular Q4H PRN Max 4/day HOLLI Lion      benztropine  1 mg Intramuscular Q4H PRN Max 6/day Jordan C Holter,       haloperidol lactate  5 mg Intramuscular Q4H PRN Max 4/day HOLLI Lion      And    LORazepam  2 mg Intramuscular Q4H PRN Max 4/day HOLLI Lion      And    benztropine  1 mg Intramuscular Q4H PRN Max 4/day HOLLI Lion      benztropine  1 mg Oral Q4H PRN Max 6/day HOLLI Lion      benztropine  1 mg Oral Q4H PRN Max 6/day Jordan C Holter,       bisacodyl  10 mg Rectal Daily PRN HOLLI Lion      cloZAPine  500 mg  Oral HS Bora Rosario MD      cyanocobalamin  1,000 mcg Oral Daily Pia Mantilla, CRJENNIE      hydrOXYzine HCL  50 mg Oral Q6H PRN Max 4/day Jordan C Holter, DO      Or    diphenhydrAMINE  50 mg Intramuscular Q6H PRN Jordan C Holter, DO      hydrOXYzine HCL  50 mg Oral Q6H PRN Max 4/day HOLLI Lion      Or    diphenhydrAMINE  50 mg Intramuscular Q6H PRN STEVE LionNP      diphenhydrAMINE-zinc acetate   Topical BID PRN Eveline Hunt, CRNP      escitalopram  20 mg Oral Daily Eveline Hunt, STEVENP      fluticasone  1 spray Each Nare Daily Brina Guillen MD      glycopyrrolate  1 mg Oral TID Bora Rosario MD      haloperidol  1 mg Oral Q6H PRN HOLLI Lion      haloperidol  2.5 mg Oral Q4H PRN Max 4/day STEVE LionNP      haloperidol  5 mg Oral Q4H PRN Max 4/day HOLLI Lion      hydroCHLOROthiazide  12.5 mg Oral Daily Pia Mantilla, CRNP      hydrocortisone   Topical 4x Daily PRN Eveline Hunt, STEVENP      hydrOXYzine HCL  100 mg Oral Q6H PRN Max 4/day Jordan C Holter, DO      Or    LORazepam  2 mg Intramuscular Q6H PRN Jordan C Holter, DO      hydrOXYzine HCL  100 mg Oral Q6H PRN Max 4/day HOLLI Lion      Or    LORazepam  2 mg Intramuscular Q6H PRN STEVE LionNP      hydrOXYzine HCL  25 mg Oral Q6H PRN Max 4/day Jordan C Holter, DO      ibuprofen  600 mg Oral Q8H PRN STEVE LionNP      lactated ringers  75 mL/hr Intravenous Continuous Anjel Arriaza CRNA Stopped (06/21/24 0729)    lactated ringers  50 mL/hr Intravenous Continuous Ramya Arana MD Stopped (06/21/24 0729)    lactated ringers  50 mL/hr Intravenous Continuous Ramya Arana MD Stopped (06/21/24 0729)    loratadine  10 mg Oral Daily Brina Guillen MD      melatonin  3 mg Oral HS Jordan C Holter, DO      metFORMIN  500 mg Oral BID With Meals HOLIL Galvan      methocarbamol  500 mg Oral Q6H PRN HOLLI Lion      nicotine polacrilex  2 mg Oral Q4H PRN Bora Rosario MD       OLANZapine  5 mg Oral Q4H PRN Max 3/day Ion C Holter, DO      Or    OLANZapine  2.5 mg Intramuscular Q4H PRN Max 3/day [facility] Holter, DO      OLANZapine  5 mg Oral Q3H PRN Max 3/day [facility] Holter, DO      Or    OLANZapine  5 mg Intramuscular Q3H PRN Max 3/day [facility] Holter, DO      OLANZapine  2.5 mg Oral Q4H PRN Max 6/day [facility] Holter, DO      ondansetron  4 mg Oral Q6H PRN HOLLI Lion      polyethylene glycol  17 g Oral Daily HOLLI Lion      polyethylene glycol  17 g Oral Daily PRN [facility] Holter, DO      propranolol  10 mg Oral Q12H KAYLIE HOLLI Lion      senna-docusate sodium  1 tablet Oral Daily PRN [facility] Holter, DO      senna-docusate sodium  2 tablet Oral BID HOLLI Lion      traZODone  50 mg Oral HS PRN HOLLI Lion      white petrolatum-mineral oil   Topical TID PRN HOLLI Lion         Risks / Benefits of Treatment:  Risks, benefits, and possible side effects of medications explained to patient and patient verbalizes understanding and agreement for treatment.    Counseling / Coordination of Care:  Patient's progress reviewed with nursing staff.  Medications, treatment progress and treatment plan reviewed with patient.  Supportive counseling provided to the patient.    Total floor/unit time spent today 25 minutes. Greater than 50% of total time was spent with the patient and / or family counseling and / or coordination of care. A description of the counseling / coordination of care: medication education, treatment plan, supportive therapy.

## 2024-06-27 NOTE — PLAN OF CARE
Problem: Ineffective Coping  Goal: Identifies ineffective coping skills  Outcome: Progressing  Goal: Identifies healthy coping skills  Outcome: Progressing  Goal: Demonstrates healthy coping skills  Outcome: Progressing  Goal: Participates in unit activities  Description: Interventions:  - Provide therapeutic environment   - Provide required programming   - Redirect inappropriate behaviors   Outcome: Progressing   Attended 15/18 groups offered in the past 2 days.

## 2024-06-27 NOTE — NURSING NOTE
Alberto maintained on ongoing SAFE precaution without incident on this shift.  Awake, alert , visible, pleasant and cooperative upon approach.   Attended and participated in 7 out of 8 groups today.  Continues to be compliant with medication regimen and had snack.  Alberto spend most of his time watching TV, playing cards and other games with selective peers.  Denies any depression or anxiety.  No delusion or A/T/V hallucination noted.  Behavioral control.

## 2024-06-27 NOTE — NURSING NOTE
Pt is present on the milieu with intervals of napping. He refused breakfast and consumed 100% of lunch. Took his medications without incidence. Denied psychiatric symptoms except AH. Nicorette gum given at 0836 and  1249. No behavioral issues.

## 2024-06-27 NOTE — PLAN OF CARE
Problem: Alteration in Thoughts and Perception  Goal: Treatment Goal: Gain control of psychotic behaviors/thinking, reduce/eliminate presenting symptoms and demonstrate improved reality functioning upon discharge  Outcome: Progressing  Goal: Verbalize thoughts and feelings  Description: Interventions:  - Promote a nonjudgmental and trusting relationship with the patient through active listening and therapeutic communication  - Assess patient's level of functioning, behavior and potential for risk  - Engage patient in 1 on 1 interactions  - Encourage patient to express fears, feelings, frustrations, and discuss symptoms    - [place] patient to reality, help patient recognize reality-based thinking   - Administer medications as ordered and assess for potential side effects  - Provide the patient education related to the signs and symptoms of the illness and desired effects of prescribed medications  Outcome: Progressing  Goal: Refrain from acting on delusional thinking/internal stimuli  Description: Interventions:  - Monitor patient closely, per order   - Utilize least restrictive measures   - Set reasonable limits, give positive feedback for acceptable   - Administer medications as ordered and monitor of potential side effects  Outcome: Progressing  Goal: Agree to be compliant with medication regime, as prescribed and report medication side effects  Description: Interventions:  - Offer appropriate PRN medication and supervise ingestion; conduct AIMS, as needed   Outcome: Progressing  Goal: Attend and participate in unit activities, including therapeutic, recreational, and educational groups  Description: Interventions:  -Encourage Visitation and family involvement in care  Outcome: Progressing  Goal: Recognize dysfunctional thoughts, communicate reality-based thoughts at the time of discharge  Description: Interventions:  - Provide medication and psycho-education to assist patient in compliance and developing  insight into his/her illness   Outcome: Progressing  Goal: Complete daily ADLs, including personal hygiene independently, as able  Description: Interventions:  - Observe, teach, and assist patient with ADLS  - Monitor and promote a balance of rest/activity, with adequate nutrition and elimination   Outcome: Progressing     Problem: Ineffective Coping  Goal: Identifies ineffective coping skills  Outcome: Progressing  Goal: Identifies healthy coping skills  Outcome: Progressing  Goal: Demonstrates healthy coping skills  Outcome: Progressing  Goal: Participates in unit activities  Description: Interventions:  - Provide therapeutic environment   - Provide required programming   - Redirect inappropriate behaviors   Outcome: Progressing     Problem: Depression  Goal: Treatment Goal: Demonstrate behavioral control of depressive symptoms, verbalize feelings of improved mood/affect, and adopt new coping skills prior to discharge  Outcome: Progressing  Goal: Verbalize thoughts and feelings  Description: Interventions:  - Assess and re-assess patient's level of risk   - Engage patient in 1:1 interactions, daily, for a minimum of 15 minutes   - Encourage patient to express feelings, fears, frustrations, hopes   Outcome: Progressing  Goal: Refrain from harming self  Description: Interventions:  - Monitor patient closely, per order   - Supervise medication ingestion, monitor effects and side effects   Outcome: Progressing  Goal: Refrain from isolation  Description: Interventions:  - Develop a trusting relationship   - Encourage socialization   Outcome: Progressing  Goal: Refrain from self-neglect  Outcome: Progressing  Goal: Attend and participate in unit activities, including therapeutic, recreational, and educational groups  Description: Interventions:  - Provide therapeutic and educational activities daily, encourage attendance and participation, and document same in the medical record   Outcome: Progressing  Goal: Complete  daily ADLs, including personal hygiene independently, as able  Description: Interventions:  - Observe, teach, and assist patient with ADLS  -  Monitor and promote a balance of rest/activity, with adequate nutrition and elimination   Outcome: Progressing     Problem: Anxiety  Goal: Anxiety is at manageable level  Description: Interventions:  - Assess and monitor patient's anxiety level.   - Monitor for signs and symptoms (heart palpitations, chest pain, shortness of breath, headaches, nausea, feeling jumpy, restlessness, irritable, apprehensive).   - Collaborate with interdisciplinary team and initiate plan and interventions as ordered.  - [place] patient to unit/surroundings  - Explain treatment plan  - Encourage participation in care  - Encourage verbalization of concerns/fears  - Identify coping mechanisms  - Assist in developing anxiety-reducing skills  - Administer/offer alternative therapies  - Limit or eliminate stimulants  Outcome: Progressing

## 2024-06-27 NOTE — SOCIAL WORK
SW checked in with pt. Pt is pleasant, social and engaged.   Pt denied any current concerns and confirmed he would like to accept the time offered by his aunt for a virtual visit tomorrow at 6:30

## 2024-06-27 NOTE — NURSING NOTE
Patient pleasant, calm and cooperative, able to make needs known. Is visible on the module, social with peers, attends groups. Denies SI/HI/AVH, anxiety or depression. Medication compliant. Will continue to monitor.

## 2024-06-27 NOTE — PROGRESS NOTES
06/27/24 0737   Team Meeting   Meeting Type Daily Rounds   Team Members Present   Team Members Present Physician;Nurse;;Other (Discipline and Name)   Patient/Family Present   Patient Present No   Patient's Family Present No     In attendance:  JOSÉ MIGUEL Camp CRNP Daniel Teles, RN  Luisana Alvarado, hospitalsW  Carla Lux, hospitalsW  MATILDA Daniel.S.    Groups: 7/8    Pt observed hovering around female peers. Pt expressed some irritability yesterday. No other bx issues noted. Pt reports ongoing AH and denies other symptoms. Pt is due for labs this evening.

## 2024-06-28 LAB
BASOPHILS # BLD AUTO: 0.07 THOUSANDS/ÂΜL (ref 0–0.1)
BASOPHILS NFR BLD AUTO: 1 % (ref 0–1)
BNP SERPL-MCNC: 9 PG/ML (ref 0–100)
CARDIAC TROPONIN I PNL SERPL HS: <2 NG/L (ref 8–18)
CK SERPL-CCNC: 249 U/L (ref 39–308)
CLOZAPINE SERPL-MCNC: 420 NG/ML (ref 350–900)
CRP SERPL QL: 7.5 MG/L
EOSINOPHIL # BLD AUTO: 0.37 THOUSAND/ÂΜL (ref 0–0.61)
EOSINOPHIL NFR BLD AUTO: 4 % (ref 0–6)
ERYTHROCYTE [DISTWIDTH] IN BLOOD BY AUTOMATED COUNT: 14.2 % (ref 11.6–15.1)
HCT VFR BLD AUTO: 43.5 % (ref 36.5–49.3)
HGB BLD-MCNC: 12.9 G/DL (ref 12–17)
IMM GRANULOCYTES # BLD AUTO: 0.05 THOUSAND/UL (ref 0–0.2)
IMM GRANULOCYTES NFR BLD AUTO: 1 % (ref 0–2)
LYMPHOCYTES # BLD AUTO: 3.34 THOUSANDS/ÂΜL (ref 0.6–4.47)
LYMPHOCYTES NFR BLD AUTO: 32 % (ref 14–44)
MCH RBC QN AUTO: 23.7 PG (ref 26.8–34.3)
MCHC RBC AUTO-ENTMCNC: 29.7 G/DL (ref 31.4–37.4)
MCV RBC AUTO: 80 FL (ref 82–98)
MONOCYTES # BLD AUTO: 0.81 THOUSAND/ÂΜL (ref 0.17–1.22)
MONOCYTES NFR BLD AUTO: 8 % (ref 4–12)
NEUTROPHILS # BLD AUTO: 5.91 THOUSANDS/ÂΜL (ref 1.85–7.62)
NEUTS SEG NFR BLD AUTO: 54 % (ref 43–75)
NRBC BLD AUTO-RTO: 0 /100 WBCS
PLATELET # BLD AUTO: 294 THOUSANDS/UL (ref 149–390)
PMV BLD AUTO: 10.2 FL (ref 8.9–12.7)
RBC # BLD AUTO: 5.45 MILLION/UL (ref 3.88–5.62)
WBC # BLD AUTO: 10.55 THOUSAND/UL (ref 4.31–10.16)

## 2024-06-28 PROCEDURE — 85025 COMPLETE CBC W/AUTO DIFF WBC: CPT

## 2024-06-28 PROCEDURE — 83880 ASSAY OF NATRIURETIC PEPTIDE: CPT

## 2024-06-28 PROCEDURE — 86140 C-REACTIVE PROTEIN: CPT

## 2024-06-28 PROCEDURE — 80159 DRUG ASSAY CLOZAPINE: CPT

## 2024-06-28 PROCEDURE — 84484 ASSAY OF TROPONIN QUANT: CPT

## 2024-06-28 PROCEDURE — 99232 SBSQ HOSP IP/OBS MODERATE 35: CPT | Performed by: PSYCHIATRY & NEUROLOGY

## 2024-06-28 PROCEDURE — 82550 ASSAY OF CK (CPK): CPT

## 2024-06-28 RX ADMIN — PROPRANOLOL HYDROCHLORIDE 10 MG: 10 TABLET ORAL at 21:15

## 2024-06-28 RX ADMIN — ARIPIPRAZOLE 15 MG: 15 TABLET ORAL at 08:11

## 2024-06-28 RX ADMIN — NICOTINE POLACRILEX 2 MG: 2 GUM, CHEWING ORAL at 21:15

## 2024-06-28 RX ADMIN — CYANOCOBALAMIN TAB 1000 MCG 1000 MCG: 1000 TAB at 08:11

## 2024-06-28 RX ADMIN — LORATADINE 10 MG: 10 TABLET ORAL at 08:12

## 2024-06-28 RX ADMIN — FLUTICASONE PROPIONATE 1 SPRAY: 50 SPRAY, METERED NASAL at 08:11

## 2024-06-28 RX ADMIN — GLYCOPYRROLATE 1 MG: 1 TABLET ORAL at 21:15

## 2024-06-28 RX ADMIN — NICOTINE POLACRILEX 2 MG: 2 GUM, CHEWING ORAL at 17:16

## 2024-06-28 RX ADMIN — GLYCOPYRROLATE 1 MG: 1 TABLET ORAL at 08:12

## 2024-06-28 RX ADMIN — POLYETHYLENE GLYCOL 3350 17 G: 17 POWDER, FOR SOLUTION ORAL at 08:11

## 2024-06-28 RX ADMIN — HYDROCHLOROTHIAZIDE 12.5 MG: 12.5 TABLET ORAL at 08:12

## 2024-06-28 RX ADMIN — METFORMIN HYDROCHLORIDE 500 MG: 500 TABLET, FILM COATED ORAL at 08:11

## 2024-06-28 RX ADMIN — MELATONIN TAB 3 MG 3 MG: 3 TAB at 21:15

## 2024-06-28 RX ADMIN — AMLODIPINE BESYLATE 5 MG: 5 TABLET ORAL at 08:12

## 2024-06-28 RX ADMIN — PROPRANOLOL HYDROCHLORIDE 10 MG: 10 TABLET ORAL at 08:12

## 2024-06-28 RX ADMIN — METFORMIN HYDROCHLORIDE 500 MG: 500 TABLET, FILM COATED ORAL at 17:15

## 2024-06-28 RX ADMIN — NICOTINE POLACRILEX 2 MG: 2 GUM, CHEWING ORAL at 13:16

## 2024-06-28 RX ADMIN — NICOTINE POLACRILEX 2 MG: 2 GUM, CHEWING ORAL at 08:12

## 2024-06-28 RX ADMIN — SENNOSIDES AND DOCUSATE SODIUM 2 TABLET: 8.6; 5 TABLET ORAL at 08:11

## 2024-06-28 RX ADMIN — GLYCOPYRROLATE 1 MG: 1 TABLET ORAL at 17:15

## 2024-06-28 RX ADMIN — CLOZAPINE 500 MG: 100 TABLET ORAL at 21:15

## 2024-06-28 RX ADMIN — ESCITALOPRAM OXALATE 20 MG: 10 TABLET, FILM COATED ORAL at 08:11

## 2024-06-28 NOTE — PROGRESS NOTES
06/28/24 0742   Team Meeting   Meeting Type Daily Rounds   Team Members Present   Team Members Present Physician;Nurse;;Other (Discipline and Name)   Patient/Family Present   Patient Present No   Patient's Family Present No     In attendance:  MD Arin Gamino, RN  Luisana Alvarado, [facility]  Carla Lux, [facility]  Irais Mcdowell M.S.    Groups: 7/11    Pt slept in late yesterday then was up, pleasant and social. Pt reports ongoing AH threatening to harm him and family. No bx issues noted.

## 2024-06-28 NOTE — PROGRESS NOTES
Psychiatry Progress Note [facility]    Alberto Berumen 28 y.o. male MRN: [id number]  Unit/Bed#: [facility] 108-02 Encounter: [id number]  Code Status: Level 1 - Full Code    PCP: Joce Juan MD    Date of Admission:  3/29/2024 2008   Date of Service:  06/28/24    Patient Active Problem List   Diagnosis    GERD (gastroesophageal reflux disease)    Medical clearance for psychiatric admission    Schizoaffective disorder, bipolar type (HCC)    Tobacco abuse    T wave inversion in EKG    Syringoma    Chronic idiopathic constipation    Vitamin B 12 deficiency    Vitamin D deficiency    Confluent and reticulate papillomatosis    Class 2 obesity in adult    Primary hypertension    Elevated hemoglobin A1c    Bilateral lower extremity edema     Review of systems: Unremarkable  Psychiatric diagnosis: Schizoaffective bipolar  assessment  Overall Status: Still hearing threatening voices with no significant changes  Certification Statement: The patient will continue to require additional inpatient hospital stay due to ongoing voices that are threatening to kill him and his family lack of response to medications and ECT so far.     Medications: Clozapine 500 mg at bedtime, propranolol 10 mg every 12 hours as as needed for anxiety, Abilify 15 mg mg at bedtime Lexapro 20 mg once a day, atropine eyedrops sublingual for drooling of saliva and senna 2 tablets twice a day for constipation  All medications reviewed and recommend they be continued for symptom management   side effects from treatment: None reported  Medication changes   None today  Medication education   Risks side effects benefits and precautions of medications discussed with patient and he did verbalize an understanding about risks for metabolic syndrome from being on neuroleptics and is form tardive dyskinesia etc. and special precautions about being on clozapine   Understanding of medications: Has some understanding   Justification for dual  anti-psychotics: Not applicable    Non-pharmacological treatments  Continue with individual, group, milieu and occupational therapy using recovery principles and psycho-education about accepting illness and the need for treatment.   to contact aunt and explore ACT team referral which he never had  ECT to be continued  q 2 weekly for maintenance x 6  Refuses to see a dietician and agrees to do more exercises as he is about 100 lbs overweight   Prolactin level high so Risperdal replaced with Abilify    Safety  Safety and communication plan established to target dynamic risk factors discussed above.    Discharge Plan   Back to his aunt with an ACT team    Interval Progress   Still reports same threatening voices to kill him and his family but not commanding and still with his sad anxious with passive death wishes because of the voices not going away but not suicidal.  Continues to have a flat affect with no good mood reactivity wearing hospital clothing and not aggressive or agitated or threatening with no need for behavioral PRNs.  Somewhat evasive guarded with passive death wishes but able to contract for safety and compliant with medications and looking forward to having ECT next week which he claims has helped    I Acceptance by patient: Accepting  Hopefulness in recovery: Living with his aunt and sister  Involved in reintegration process: Talking to his sister and aunt  Trusting in relationship with psychiatrist: Appearing to trust  Sleep: Good  Appetite: good  Compliance with Medications: Good  Group attendance: Some groups 7/10  Significant events: Still with same threatening voices that make him feel depressed    Mental Status Exam  Appearance: age appropriate, improved grooming, looks older than stated age, overweight, with hair in dreadlocks casually dressed with a clean shaven face, fairly groomed with good eye contact dressed in hospital clothing as usual found sitting on his bed in his room  with constricted affect with good eye contact  behavior: cooperative, mildly anxious, evasive, gesturing, slow responses.   speech: normal rate, normal volume, normal pitch  Mood: dysphoric, anxious, continues to report feeling good with the ECT   affect: constricted, inappropriate, mood-congruent depressed sad with low self-esteem  Thought Process: organized, logical, coherent, goal directed, linear, decreased rate of thoughts, slowing of thoughts, negative thinking, impaired abstract reasoning, concrete  Thought Content: paranoid ideation, some paranoia, grandiose ideas, intrusive thoughts, preoccupied, chronic, continues to report paranoia about people threatening to kill him and his family because of the voices.  He believes ECT has helped so that he is not much in his head.  No other delusions elicited.  No current suicidal or homicidal thoughts and no plans verbalized but today reports having passive death wishes because of voices with no plans and able to CFS and it has not changed yet  .  No phobias obsessions compulsions or distorted body perceptions reported.  Preoccupied with wanting to get ECTs more often despite reminding him that it may impair his memory and finally he agreed to such as it is to every 2 weeks  Perceptual Disturbances: Still reports same threatening voices to kill him and his family that makes him feel sad but not to the point of suicide  Hx Risk Factors: chronic psychiatric problems, chronic anxiety symptoms, chronic psychotic symptoms,    Sensorium: Oriented x 3 spheres and situation  Cognition: recent and remote memory grossly intact  Consciousness: alert and awake  Attention: attention span and concentration are age appropriate  Intellect: appears to be of average intelligence  Insight: intact  Judgement: intact  Motor Activity: no abnormal movements     Vitals  Temp:  [97.5 °F (36.4 °C)-97.8 °F (36.6 °C)] 97.5 °F (36.4 °C)  HR:  [] 109  Resp:  [18] 18  BP: (124-136)/(72-85)  129/72  SpO2:  [96 %-100 %] 96 %  No intake or output data in the 24 hours ending 06/28/24 0510              Lab Results: All Labs For Current Hospital Admission Reviewed     Current Facility-Administered Medications   Medication Dose Route Frequency Provider Last Rate    acetaminophen  650 mg Oral Q4H PRN Jordan C Holter, DO      acetaminophen  650 mg Oral Q6H PRN HOLLI Lion      aluminum-magnesium hydroxide-simethicone  30 mL Oral Q4H PRN Jordan C Holter,       amLODIPine  5 mg Oral Daily HOLLI Lion      ARIPiprazole  15 mg Oral Daily Bora Rosario MD      Artificial Tears  1 drop Both Eyes Q3H PRN Jordan C Holter, DO      atropine  1 drop Sublingual Daily PRN HOLLI Lion      haloperidol lactate  2.5 mg Intramuscular Q4H PRN Max 4/day HOLLI Lion      And    LORazepam  1 mg Intramuscular Q4H PRN Max 4/day HOLLI Lion      And    benztropine  0.5 mg Intramuscular Q4H PRN Max 4/day HOLLI Lion      benztropine  1 mg Intramuscular Q4H PRN Max 6/day Jordan C Holter,       haloperidol lactate  5 mg Intramuscular Q4H PRN Max 4/day HOLLI Lion      And    LORazepam  2 mg Intramuscular Q4H PRN Max 4/day HOLLI Lion      And    benztropine  1 mg Intramuscular Q4H PRN Max 4/day HOLLI Lion      benztropine  1 mg Oral Q4H PRN Max 6/day HOLLI Lion      benztropine  1 mg Oral Q4H PRN Max 6/day Jordan C Holter,       bisacodyl  10 mg Rectal Daily PRN HOLLI Lion      cloZAPine  500 mg Oral HS Bora Rosario MD      cyanocobalamin  1,000 mcg Oral Daily HOLLI Galvan      hydrOXYzine HCL  50 mg Oral Q6H PRN Max 4/day Jordan C Holter, DO      Or    diphenhydrAMINE  50 mg Intramuscular Q6H PRN Jordan C Holter, DO      hydrOXYzine HCL  50 mg Oral Q6H PRN Max 4/day HOLLI Lion      Or    diphenhydrAMINE  50 mg Intramuscular Q6H PRN Eveline Hangey, CRNP      diphenhydrAMINE-zinc acetate   Topical BID PRN HOLLI Lion       escitalopram  20 mg Oral Daily HOLLI Lion      fluticasone  1 spray Each Nare Daily Brina Guillen MD      glycopyrrolate  1 mg Oral TID Bora Rosario MD      haloperidol  1 mg Oral Q6H PRN HOLLI Lion      haloperidol  2.5 mg Oral Q4H PRN Max 4/day HOLLI Lion      haloperidol  5 mg Oral Q4H PRN Max 4/day HOLLI Lion      hydroCHLOROthiazide  12.5 mg Oral Daily HOLLI Galvan      hydrocortisone   Topical 4x Daily PRN HOLLI Lion      hydrOXYzine HCL  100 mg Oral Q6H PRN Max 4/day Ion C Holter, DO      Or    LORazepam  2 mg Intramuscular Q6H PRN Ion C Holter, DO      hydrOXYzine HCL  100 mg Oral Q6H PRN Max 4/day HOLLI Lion      Or    LORazepam  2 mg Intramuscular Q6H PRN HOLLI Lion      hydrOXYzine HCL  25 mg Oral Q6H PRN Max 4/day Ion C Holter, DO      ibuprofen  600 mg Oral Q8H PRN HOLLI Lion      lactated ringers  75 mL/hr Intravenous Continuous Anjel Arriaza CRNA Stopped (06/21/24 0729)    lactated ringers  50 mL/hr Intravenous Continuous Ramya Arana MD Stopped (06/21/24 0729)    lactated ringers  50 mL/hr Intravenous Continuous Ramya Arana MD Stopped (06/21/24 0729)    loratadine  10 mg Oral Daily Brina Guillen MD      melatonin  3 mg Oral HS Ion C Holter, DO      metFORMIN  500 mg Oral BID With Meals HOLLI Galvan      methocarbamol  500 mg Oral Q6H PRN HOLLI Lion      nicotine polacrilex  2 mg Oral Q4H PRN Bora Rosario MD      OLANZapine  5 mg Oral Q4H PRN Max 3/day Ion C Holter, DO      Or    OLANZapine  2.5 mg Intramuscular Q4H PRN Max 3/day Ion C Holter, DO      OLANZapine  5 mg Oral Q3H PRN Max 3/day Ion C Holter, DO      Or    OLANZapine  5 mg Intramuscular Q3H PRN Max 3/day Ion C Holter, DO      OLANZapine  2.5 mg Oral Q4H PRN Max 6/day Jordan C Holter, DO      ondansetron  4 mg Oral Q6H PRN HOLLI Lion      polyethylene glycol  17 g Oral Daily Eveline Hunt,  HOLLI      polyethylene glycol  17 g Oral Daily PRN Jordan C Holter,       propranolol  10 mg Oral Q12H [facility] HOLLI Lion      senna-docusate sodium  1 tablet Oral Daily PRN Jordan C Holter, DO      senna-docusate sodium  2 tablet Oral BID HOLLI Lion      traZODone  50 mg Oral HS PRN HOLLI Lion      white petrolatum-mineral oil   Topical TID PRN HOLLI Lion         Counseling / Coordination of Care: Total floor / unit time spent today 15 minutes. Greater than 50% of total time was spent with the patient and / or family counseling and / or somewhat receptive to supportive listening and teaching positive coping skills to deal with symptom mangement.     Patient's Rights, confidentiality and exceptions to confidentiality, use of automated medical record, Behavioral Health Services staff access to medical record, and consent to treatment reviewed.    This note has been dictated and hence there may be problems with punctuation, spelling and formatting and if anyone has any concerns please address them to Dr. Rosario   This note is not shared with patient due to potential for making patient's condition worse by knowing the content of the note.

## 2024-06-28 NOTE — PLAN OF CARE
Problem: Alteration in Thoughts and Perception  Goal: Treatment Goal: Gain control of psychotic behaviors/thinking, reduce/eliminate presenting symptoms and demonstrate improved reality functioning upon discharge  Outcome: Progressing  Goal: Verbalize thoughts and feelings  Description: Interventions:  - Promote a nonjudgmental and trusting relationship with the patient through active listening and therapeutic communication  - Assess patient's level of functioning, behavior and potential for risk  - Engage patient in 1 on 1 interactions  - Encourage patient to express fears, feelings, frustrations, and discuss symptoms    - [place] patient to reality, help patient recognize reality-based thinking   - Administer medications as ordered and assess for potential side effects  - Provide the patient education related to the signs and symptoms of the illness and desired effects of prescribed medications  Outcome: Progressing  Goal: Refrain from acting on delusional thinking/internal stimuli  Description: Interventions:  - Monitor patient closely, per order   - Utilize least restrictive measures   - Set reasonable limits, give positive feedback for acceptable   - Administer medications as ordered and monitor of potential side effects  Outcome: Progressing  Goal: Agree to be compliant with medication regime, as prescribed and report medication side effects  Description: Interventions:  - Offer appropriate PRN medication and supervise ingestion; conduct AIMS, as needed   Outcome: Progressing  Goal: Attend and participate in unit activities, including therapeutic, recreational, and educational groups  Description: Interventions:  -Encourage Visitation and family involvement in care  Outcome: Progressing  Goal: Recognize dysfunctional thoughts, communicate reality-based thoughts at the time of discharge  Description: Interventions:  - Provide medication and psycho-education to assist patient in compliance and developing  insight into his/her illness   Outcome: Progressing  Goal: Complete daily ADLs, including personal hygiene independently, as able  Description: Interventions:  - Observe, teach, and assist patient with ADLS  - Monitor and promote a balance of rest/activity, with adequate nutrition and elimination   Outcome: Progressing     Problem: Ineffective Coping  Goal: Identifies ineffective coping skills  Outcome: Progressing  Goal: Identifies healthy coping skills  Outcome: Progressing  Goal: Demonstrates healthy coping skills  Outcome: Progressing  Goal: Participates in unit activities  Description: Interventions:  - Provide therapeutic environment   - Provide required programming   - Redirect inappropriate behaviors   Outcome: Progressing     Problem: Depression  Goal: Treatment Goal: Demonstrate behavioral control of depressive symptoms, verbalize feelings of improved mood/affect, and adopt new coping skills prior to discharge  Outcome: Progressing  Goal: Verbalize thoughts and feelings  Description: Interventions:  - Assess and re-assess patient's level of risk   - Engage patient in 1:1 interactions, daily, for a minimum of 15 minutes   - Encourage patient to express feelings, fears, frustrations, hopes   Outcome: Progressing  Goal: Refrain from harming self  Description: Interventions:  - Monitor patient closely, per order   - Supervise medication ingestion, monitor effects and side effects   Outcome: Progressing  Goal: Refrain from isolation  Description: Interventions:  - Develop a trusting relationship   - Encourage socialization   Outcome: Progressing  Goal: Refrain from self-neglect  Outcome: Progressing  Goal: Attend and participate in unit activities, including therapeutic, recreational, and educational groups  Description: Interventions:  - Provide therapeutic and educational activities daily, encourage attendance and participation, and document same in the medical record   Outcome: Progressing     Problem:  Anxiety  Goal: Anxiety is at manageable level  Description: Interventions:  - Assess and monitor patient's anxiety level.   - Monitor for signs and symptoms (heart palpitations, chest pain, shortness of breath, headaches, nausea, feeling jumpy, restlessness, irritable, apprehensive).   - Collaborate with interdisciplinary team and initiate plan and interventions as ordered.  - [place] patient to unit/surroundings  - Explain treatment plan  - Encourage participation in care  - Encourage verbalization of concerns/fears  - Identify coping mechanisms  - Assist in developing anxiety-reducing skills  - Administer/offer alternative therapies  - Limit or eliminate stimulants  Outcome: Progressing

## 2024-06-28 NOTE — NURSING NOTE
"Pt is present on the milieu intermittently and social with select peers. He consumed 100% of breakfast and lunch. Took his medications without incidence. Nicorette gum given at 0812 and 1316. He denied psychiatric symptoms except AH that are \"the same.\" No behavioral issues.   "

## 2024-06-29 PROCEDURE — 99232 SBSQ HOSP IP/OBS MODERATE 35: CPT

## 2024-06-29 RX ADMIN — NICOTINE POLACRILEX 2 MG: 2 GUM, CHEWING ORAL at 12:48

## 2024-06-29 RX ADMIN — MELATONIN TAB 3 MG 3 MG: 3 TAB at 21:20

## 2024-06-29 RX ADMIN — SENNOSIDES AND DOCUSATE SODIUM 2 TABLET: 8.6; 5 TABLET ORAL at 08:23

## 2024-06-29 RX ADMIN — POLYETHYLENE GLYCOL 3350 17 G: 17 POWDER, FOR SOLUTION ORAL at 08:23

## 2024-06-29 RX ADMIN — GLYCOPYRROLATE 1 MG: 1 TABLET ORAL at 21:20

## 2024-06-29 RX ADMIN — CLOZAPINE 500 MG: 100 TABLET ORAL at 21:20

## 2024-06-29 RX ADMIN — LORATADINE 10 MG: 10 TABLET ORAL at 08:23

## 2024-06-29 RX ADMIN — PROPRANOLOL HYDROCHLORIDE 10 MG: 10 TABLET ORAL at 08:23

## 2024-06-29 RX ADMIN — NICOTINE POLACRILEX 2 MG: 2 GUM, CHEWING ORAL at 21:22

## 2024-06-29 RX ADMIN — METFORMIN HYDROCHLORIDE 500 MG: 500 TABLET, FILM COATED ORAL at 08:23

## 2024-06-29 RX ADMIN — NICOTINE POLACRILEX 2 MG: 2 GUM, CHEWING ORAL at 08:24

## 2024-06-29 RX ADMIN — ESCITALOPRAM OXALATE 20 MG: 10 TABLET, FILM COATED ORAL at 08:23

## 2024-06-29 RX ADMIN — FLUTICASONE PROPIONATE 1 SPRAY: 50 SPRAY, METERED NASAL at 08:22

## 2024-06-29 RX ADMIN — GLYCOPYRROLATE 1 MG: 1 TABLET ORAL at 08:23

## 2024-06-29 RX ADMIN — SENNOSIDES AND DOCUSATE SODIUM 2 TABLET: 8.6; 5 TABLET ORAL at 17:22

## 2024-06-29 RX ADMIN — METFORMIN HYDROCHLORIDE 500 MG: 500 TABLET, FILM COATED ORAL at 17:22

## 2024-06-29 RX ADMIN — CYANOCOBALAMIN TAB 1000 MCG 1000 MCG: 1000 TAB at 08:23

## 2024-06-29 RX ADMIN — HYDROCHLOROTHIAZIDE 12.5 MG: 12.5 TABLET ORAL at 08:23

## 2024-06-29 RX ADMIN — PROPRANOLOL HYDROCHLORIDE 10 MG: 10 TABLET ORAL at 21:20

## 2024-06-29 RX ADMIN — AMLODIPINE BESYLATE 5 MG: 5 TABLET ORAL at 08:23

## 2024-06-29 RX ADMIN — NICOTINE POLACRILEX 2 MG: 2 GUM, CHEWING ORAL at 17:22

## 2024-06-29 RX ADMIN — GLYCOPYRROLATE 1 MG: 1 TABLET ORAL at 17:22

## 2024-06-29 RX ADMIN — ARIPIPRAZOLE 15 MG: 15 TABLET ORAL at 08:23

## 2024-06-29 NOTE — NURSING NOTE
Pt is present on the milieu intermittently and social with select peers. He consumed 100% of breakfast and lunch. Took his medications without incidence. Nicorette gum given at 0824 and 1248. He denied psychiatric symptoms except voices. Denied any needs. No behavioral issues.

## 2024-06-29 NOTE — NURSING NOTE
"Pt is visible on the unit and social with select peers. Consumed 100% of dinner. Took medications without incidence. Pt is pleasant and cooperative. Reports AH, \"they say they're going to kill me when I leave here\". No behavioral issues. Pt offers no concerns or complaints. Heart rate was 110 at 2058 and 104 on manual recheck. Continuous safety checks maintained.  "

## 2024-06-29 NOTE — NURSING NOTE
Patient allowed staff to draw his labs (blood work) this evening. WBC are elevated and MCV , MCH,  and MCHC are decreased.

## 2024-06-29 NOTE — NURSING NOTE
Patient slept through the entire night, eight hours.  Patient slept comfortably with no signs of distress

## 2024-06-29 NOTE — PROGRESS NOTES
Progress Note - Behavioral Health   Alberto Berumen 28 y.o. male MRN: [id number]  Unit/Bed#: [facility] 108-02 Encounter: [id number]      Subjective:     Documentation, nursing notes, medication reconciliation, labs, and vitals reviewed. Patient was seen for continuing care and reviewed with treatment team.  No acute events over the past 24 hours. Per nursing report, pt was compliance with lab draw.  He has been reporting ongoing AH command in nature telling him to harm self and family. No medication changes over the past 24 hours.     On evaluation today, patient is laying in bed, and is scant during conversation although cooperative during assessment.  He reports mood has been well-controlled and he appears mildly anxious on approach.  He reports ongoing paranoid thoughts but does not elaborate further.  He denies AVH at this time.  He denies passive or active SI/HI with plan or intent.  He denies thoughts to self-injure.  He declines to attend group therapy.    Continues to tolerate current medications with no adverse effects.     No self-harming/suicidal ideation, plan, or intent upon direct inquiry. No thoughts to harm others.  No agitation or aggression noted. Does not appear overtly manic. Offers no further complaints.       Psychiatric ROS:  Behavior over the last 24 hours: unchanged  Sleep: normal  Appetite: normal  Medication side effects: No   ROS: no complaints, all other systems are negative      Mental Status Evaluation:    Appearance:  casually dressed, marginal hygiene   Behavior:  pleasant, cooperative, laying in bed   Speech:  normal volume, normal pitch, scant   Mood:  mildly anxious   Affect:  constricted   Thought Process:  coherent, goal directed   Associations: intact associations   Thought Content:  some paranoia   Perceptual Disturbances: denies auditory or visual hallucinations when asked, but appears distracted   Risk Potential: Suicidal ideation - None at present  Homicidal ideation - None at  present  Potential for aggression - No   Sensorium:  oriented to person, place, and time/date   Memory:  recent and remote memory grossly intact   Consciousness:  alert and awake   Attention/Concentration: attention span and concentration are age appropriate   Insight:  fair   Judgment: fair   Gait/Station: normal gait/station   Motor Activity: no abnormal movements       Vital signs in last 24 hours:    Temp:  [97.5 °F (36.4 °C)-97.8 °F (36.6 °C)] 97.8 °F (36.6 °C)  HR:  [95-99] 95  Resp:  [18] 18  BP: (120-151)/(61-97) 131/86    Laboratory results: I have personally reviewed all pertinent laboratory/tests results. No significant concerns at this time.     Clozapine level: 420    Results from the past 24 hours:   Recent Results (from the past 24 hour(s))   B-Type Natriuretic Peptide(BNP)    Collection Time: 06/28/24  8:00 PM   Result Value Ref Range    BNP 9 0 - 100 pg/mL   C-reactive protein    Collection Time: 06/28/24  8:00 PM   Result Value Ref Range    CRP 7.5 (H) <3.0 mg/L   CK    Collection Time: 06/28/24  8:00 PM   Result Value Ref Range    Total  39 - 308 U/L   Clozapine-[facility]    Collection Time: 06/28/24  8:00 PM   Result Value Ref Range    Clozapine Lvl 420 350 - 900 ng/mL   High Sensitivity Troponin I Random    Collection Time: 06/28/24  8:00 PM   Result Value Ref Range    HS TnI random <2 (L) 8 - 18 ng/L   CBC and differential    Collection Time: 06/28/24  8:00 PM   Result Value Ref Range    WBC 10.55 (H) 4.31 - 10.16 Thousand/uL    RBC 5.45 3.88 - 5.62 Million/uL    Hemoglobin 12.9 12.0 - 17.0 g/dL    Hematocrit 43.5 36.5 - 49.3 %    MCV 80 (L) 82 - 98 fL    MCH 23.7 (L) 26.8 - 34.3 pg    MCHC 29.7 (L) 31.4 - 37.4 g/dL    RDW 14.2 11.6 - 15.1 %    MPV 10.2 8.9 - 12.7 fL    Platelets 294 149 - 390 Thousands/uL    nRBC 0 /100 WBCs    Segmented % 54 43 - 75 %    Immature Grans % 1 0 - 2 %    Lymphocytes % 32 14 - 44 %    Monocytes % 8 4 - 12 %    Eosinophils Relative 4 0 - 6 %    Basophils Relative  1 0 - 1 %    Absolute Neutrophils 5.91 1.85 - 7.62 Thousands/µL    Absolute Immature Grans 0.05 0.00 - 0.20 Thousand/uL    Absolute Lymphocytes 3.34 0.60 - 4.47 Thousands/µL    Absolute Monocytes 0.81 0.17 - 1.22 Thousand/µL    Eosinophils Absolute 0.37 0.00 - 0.61 Thousand/µL    Basophils Absolute 0.07 0.00 - 0.10 Thousands/µL         Progress Toward Goals: no significant improvement today    Suicide/Homicide Risk Assessment:    Risk of Harm to Self:   Nursing Suicide Risk Assessment Last 24 hours: C-SSRS Risk (Since Last Contact)  Calculated C-SSRS Risk Score (Since Last Contact): No Risk Indicated    Risk of Harm to Others:  Nursing Homicide Risk Assessment: Violence Risk to Others: Denies within past 6 months    Assessment & Plan   Principal Problem:    Schizoaffective disorder, bipolar type (HCC)  Active Problems:    GERD (gastroesophageal reflux disease)    Medical clearance for psychiatric admission    Tobacco abuse    T wave inversion in EKG    Chronic idiopathic constipation    Confluent and reticulate papillomatosis    Primary hypertension    Elevated hemoglobin A1c    Bilateral lower extremity edema      Recommended Treatment:     Planned medication and treatment changes:    All current active medications have been reviewed  Encourage group therapy, milieu therapy and occupational therapy  Behavioral Health checks every 7 minutes  Continue with SLIM medical management as indicated  Disposition planning ongoing    Continue current medications:    Current Facility-Administered Medications   Medication Dose Route Frequency Provider Last Rate    acetaminophen  650 mg Oral Q4H PRN Jordan C Holter, DO      acetaminophen  650 mg Oral Q6H PRN HOLLI Lion      aluminum-magnesium hydroxide-simethicone  30 mL Oral Q4H PRN Jordan C Holter, DO      amLODIPine  5 mg Oral Daily HOLLI Lion      ARIPiprazole  15 mg Oral Daily Bora Rosario MD      Artificial Tears  1 drop Both Eyes Q3H PRN Jordan C Holter,  DO      atropine  1 drop Sublingual Daily PRN HOLLI Lion      haloperidol lactate  2.5 mg Intramuscular Q4H PRN Max 4/day HOLLI Lion      And    LORazepam  1 mg Intramuscular Q4H PRN Max 4/day STEVE LionNP      And    benztropine  0.5 mg Intramuscular Q4H PRN Max 4/day HOLLI Lion      benztropine  1 mg Intramuscular Q4H PRN Max 6/day Jordan C Holter, DO      haloperidol lactate  5 mg Intramuscular Q4H PRN Max 4/day STEVE LionNP      And    LORazepam  2 mg Intramuscular Q4H PRN Max 4/day STEVE LionNP      And    benztropine  1 mg Intramuscular Q4H PRN Max 4/day HOLLI Lion      benztropine  1 mg Oral Q4H PRN Max 6/day HOLLI Lion      benztropine  1 mg Oral Q4H PRN Max 6/day Jordan C Holter, DO      bisacodyl  10 mg Rectal Daily PRN HOLLI Lion      cloZAPine  500 mg Oral HS Bora Rosario MD      cyanocobalamin  1,000 mcg Oral Daily HOLLI Galvan      hydrOXYzine HCL  50 mg Oral Q6H PRN Max 4/day Jordan C Holter, DO      Or    diphenhydrAMINE  50 mg Intramuscular Q6H PRN Jordan C Holter, DO      hydrOXYzine HCL  50 mg Oral Q6H PRN Max 4/day HOLLI Lion      Or    diphenhydrAMINE  50 mg Intramuscular Q6H PRN HOLLI Lion      diphenhydrAMINE-zinc acetate   Topical BID PRN HOLLI Lion      escitalopram  20 mg Oral Daily HOLLI Lion      fluticasone  1 spray Each Nare Daily Brina Guillen MD      glycopyrrolate  1 mg Oral TID Bora Rosario MD      haloperidol  1 mg Oral Q6H PRN HOLLI Lion      haloperidol  2.5 mg Oral Q4H PRN Max 4/day HOLLI Lion      haloperidol  5 mg Oral Q4H PRN Max 4/day HOLLI Lion      hydroCHLOROthiazide  12.5 mg Oral Daily HOLLI Galvan      hydrocortisone   Topical 4x Daily PRN HOLLI Lion      hydrOXYzine HCL  100 mg Oral Q6H PRN Max 4/day Jordan C Holter, DO      Or    LORazepam  2 mg Intramuscular Q6H PRN Jordan C Holter, DO      hydrOXYzine HCL  100  mg Oral Q6H PRN Max 4/day HOLLI Lion      Or    LORazepam  2 mg Intramuscular Q6H PRN HOLLI Lion      hydrOXYzine HCL  25 mg Oral Q6H PRN Max 4/day Ion FISCHER Holter, DO      ibuprofen  600 mg Oral Q8H PRN HOLLI Lion      lactated ringers  75 mL/hr Intravenous Continuous Anjel Arriaza CRNA Stopped (06/21/24 0729)    lactated ringers  50 mL/hr Intravenous Continuous Ramya Arana MD Stopped (06/21/24 0729)    lactated ringers  50 mL/hr Intravenous Continuous Ramya Arana MD Stopped (06/21/24 0729)    loratadine  10 mg Oral Daily Brina Guillen MD      melatonin  3 mg Oral HS Ion FISCHER Holter, DO      metFORMIN  500 mg Oral BID With Meals HOLLI Galvan      methocarbamol  500 mg Oral Q6H PRN HOLLI Lion      nicotine polacrilex  2 mg Oral Q4H PRN Bora Rosario MD      OLANZapine  5 mg Oral Q4H PRN Max 3/day Ion FISCHER Holter, DO      Or    OLANZapine  2.5 mg Intramuscular Q4H PRN Max 3/day Ion AALIYAH Holter, DO      OLANZapine  5 mg Oral Q3H PRN Max 3/day Ion FISCHER Holter, DO      Or    OLANZapine  5 mg Intramuscular Q3H PRN Max 3/day Ion AALIYAH Holter, DO      OLANZapine  2.5 mg Oral Q4H PRN Max 6/day Ion FISCHER Holter, DO      ondansetron  4 mg Oral Q6H PRN HOLLI Lion      polyethylene glycol  17 g Oral Daily HOLLI Lion      polyethylene glycol  17 g Oral Daily PRN Jordan C Holter, DO      propranolol  10 mg Oral Q12H KAYLIE HOLLI Lion      senna-docusate sodium  1 tablet Oral Daily PRN Jordan C Holter, DO      senna-docusate sodium  2 tablet Oral BID HOLLI Lion      traZODone  50 mg Oral HS PRN HOLLI Lion      white petrolatum-mineral oil   Topical TID PRN HOLLI Lion           Risks / Benefits of Treatment:    Risks, benefits, and possible side effects of medications explained to patient and patient verbalizes understanding and agreement for treatment.    Counseling / Coordination of Care:    Patient's progress discussed  with staff in treatment team meeting.  Medications, treatment progress and treatment plan reviewed with patient.    Note Share    This note was not shared with the patient due to reasonable likelihood of causing patient harm    HOLLI Torres 06/29/24

## 2024-06-30 VITALS
WEIGHT: 260.38 LBS | DIASTOLIC BLOOD PRESSURE: 84 MMHG | RESPIRATION RATE: 18 BRPM | SYSTOLIC BLOOD PRESSURE: 128 MMHG | HEIGHT: 66 IN | TEMPERATURE: 97.9 F | HEART RATE: 104 BPM | OXYGEN SATURATION: 99 % | BODY MASS INDEX: 41.85 KG/M2

## 2024-06-30 PROCEDURE — 99232 SBSQ HOSP IP/OBS MODERATE 35: CPT

## 2024-06-30 RX ADMIN — PROPRANOLOL HYDROCHLORIDE 10 MG: 10 TABLET ORAL at 08:51

## 2024-06-30 RX ADMIN — GLYCOPYRROLATE 1 MG: 1 TABLET ORAL at 17:04

## 2024-06-30 RX ADMIN — METFORMIN HYDROCHLORIDE 500 MG: 500 TABLET, FILM COATED ORAL at 08:49

## 2024-06-30 RX ADMIN — GLYCOPYRROLATE 1 MG: 1 TABLET ORAL at 08:49

## 2024-06-30 RX ADMIN — POLYETHYLENE GLYCOL 3350 17 G: 17 POWDER, FOR SOLUTION ORAL at 08:49

## 2024-06-30 RX ADMIN — METFORMIN HYDROCHLORIDE 500 MG: 500 TABLET, FILM COATED ORAL at 17:04

## 2024-06-30 RX ADMIN — CYANOCOBALAMIN TAB 1000 MCG 1000 MCG: 1000 TAB at 08:49

## 2024-06-30 RX ADMIN — SENNOSIDES AND DOCUSATE SODIUM 2 TABLET: 8.6; 5 TABLET ORAL at 08:55

## 2024-06-30 RX ADMIN — ESCITALOPRAM OXALATE 20 MG: 10 TABLET, FILM COATED ORAL at 08:50

## 2024-06-30 RX ADMIN — AMLODIPINE BESYLATE 5 MG: 5 TABLET ORAL at 08:50

## 2024-06-30 RX ADMIN — NICOTINE POLACRILEX 2 MG: 2 GUM, CHEWING ORAL at 21:18

## 2024-06-30 RX ADMIN — GLYCOPYRROLATE 1 MG: 1 TABLET ORAL at 21:18

## 2024-06-30 RX ADMIN — NICOTINE POLACRILEX 2 MG: 2 GUM, CHEWING ORAL at 12:55

## 2024-06-30 RX ADMIN — SENNOSIDES AND DOCUSATE SODIUM 2 TABLET: 8.6; 5 TABLET ORAL at 17:04

## 2024-06-30 RX ADMIN — NICOTINE POLACRILEX 2 MG: 2 GUM, CHEWING ORAL at 08:55

## 2024-06-30 RX ADMIN — NICOTINE POLACRILEX 2 MG: 2 GUM, CHEWING ORAL at 17:04

## 2024-06-30 RX ADMIN — MELATONIN TAB 3 MG 3 MG: 3 TAB at 21:17

## 2024-06-30 RX ADMIN — CLOZAPINE 500 MG: 100 TABLET ORAL at 21:18

## 2024-06-30 RX ADMIN — LORATADINE 10 MG: 10 TABLET ORAL at 08:55

## 2024-06-30 RX ADMIN — ARIPIPRAZOLE 15 MG: 15 TABLET ORAL at 08:49

## 2024-06-30 RX ADMIN — PROPRANOLOL HYDROCHLORIDE 10 MG: 10 TABLET ORAL at 21:18

## 2024-06-30 RX ADMIN — FLUTICASONE PROPIONATE 1 SPRAY: 50 SPRAY, METERED NASAL at 08:51

## 2024-06-30 RX ADMIN — HYDROCHLOROTHIAZIDE 12.5 MG: 12.5 TABLET ORAL at 08:50

## 2024-06-30 NOTE — NURSING NOTE
Patient slept well throughout the night.  Slept 8 hours.  Resting comfortably with no signs of distress. Eyes closed and chest movements noted.

## 2024-06-30 NOTE — PLAN OF CARE
Problem: Alteration in Thoughts and Perception  Goal: Verbalize thoughts and feelings  Description: Interventions:  - Promote a nonjudgmental and trusting relationship with the patient through active listening and therapeutic communication  - Assess patient's level of functioning, behavior and potential for risk  - Engage patient in 1 on 1 interactions  - Encourage patient to express fears, feelings, frustrations, and discuss symptoms    - [place] patient to reality, help patient recognize reality-based thinking   - Administer medications as ordered and assess for potential side effects  - Provide the patient education related to the signs and symptoms of the illness and desired effects of prescribed medications  Outcome: Progressing  Goal: Agree to be compliant with medication regime, as prescribed and report medication side effects  Description: Interventions:  - Offer appropriate PRN medication and supervise ingestion; conduct AIMS, as needed   Outcome: Progressing  Goal: Complete daily ADLs, including personal hygiene independently, as able  Description: Interventions:  - Observe, teach, and assist patient with ADLS  - Monitor and promote a balance of rest/activity, with adequate nutrition and elimination   Outcome: Progressing     Problem: Ineffective Coping  Goal: Participates in unit activities  Description: Interventions:  - Provide therapeutic environment   - Provide required programming   - Redirect inappropriate behaviors   Outcome: Progressing     Problem: Depression  Goal: Attend and participate in unit activities, including therapeutic, recreational, and educational groups  Description: Interventions:  - Provide therapeutic and educational activities daily, encourage attendance and participation, and document same in the medical record   Outcome: Progressing     Problem: Anxiety  Goal: Anxiety is at manageable level  Description: Interventions:  - Assess and monitor patient's anxiety level.   -  Monitor for signs and symptoms (heart palpitations, chest pain, shortness of breath, headaches, nausea, feeling jumpy, restlessness, irritable, apprehensive).   - Collaborate with interdisciplinary team and initiate plan and interventions as ordered.  - [place] patient to unit/surroundings  - Explain treatment plan  - Encourage participation in care  - Encourage verbalization of concerns/fears  - Identify coping mechanisms  - Assist in developing anxiety-reducing skills  - Administer/offer alternative therapies  - Limit or eliminate stimulants  Outcome: Progressing     Problem: Alteration in Orientation  Goal: Interact with staff daily  Description: Interventions:  - Assess and re-assess patient's level of orientation  - Engage patient in 1 on 1 interactions, daily, for a minimum of 15 minutes   - Establish rapport/trust with patient   Outcome: Progressing  Goal: Cooperate with recommended testing/procedures  Description: Interventions:  - Determine need for ancillary testing  - Observe for mental status changes  - Implement falls/precaution protocol   Outcome: Progressing     Problem: Electroconvulsive therapy (ECT)  Goal: Verbalizes/displays adequate comfort level or baseline comfort level  Description: Interventions:  - Encourage patient to monitor pain and request assistance  - Assess pain using appropriate pain scale  - Administer analgesics based on type and severity of pain and evaluate response  - Implement non-pharmacological measures as appropriate and evaluate response  - Consider cultural and social influences on pain and pain management  - Notify physician/advanced practitioner if interventions unsuccessful or patient reports new pain  Outcome: Progressing  Goal: Absence of urinary retention  Description: INTERVENTIONS:  - Assess patient’s ability to void and empty bladder  - Monitor I/O  - Bladder scan as needed  - Discuss with physician/AP medications to alleviate retention as needed  - Discuss  catheterization for long term situations as appropriate  Outcome: Progressing  Goal: Minimal or absence of nausea and/or vomiting  Description: INTERVENTIONS:  - Administer IV fluids if ordered to ensure adequate hydration  - Maintain NPO status until nausea and vomiting are resolved  - Nasogastric tube if ordered  - Administer ordered antiemetic medications as needed  - Provide nonpharmacologic comfort measures as appropriate  - Advance diet as tolerated, if ordered  - Consider nutrition services referral to assist patient with adequate nutrition and appropriate food choices  Outcome: Progressing  Goal: Maintains adequate nutritional intake  Description: INTERVENTIONS:  - Monitor percentage of each meal consumed  - Identify factors contributing to decreased intake, treat as appropriate  - Assist with meals as needed  - Monitor I&O, weight, and lab values if indicated  - Obtain nutrition services referral as needed  Outcome: Progressing

## 2024-06-30 NOTE — NURSING NOTE
Alberto has been visible intermittently in the milieu. Napped at times. Social with select peers. Pleasant and cooperative upon approach. Able to make needs known. Denies anxiety, depression and pain. Admits to voices, but did not elaborate on same. Ate 75% of breakfast and 100% of lunch. Took medication without difficulty. Participated in Exercise group. Had Nicotine gum at 0855 and 1255. Weekly Wellness Assessment WDL. No issues or behaviors. Continue to monitor. Precautions maintained.

## 2024-06-30 NOTE — PROGRESS NOTES
Progress Note - Behavioral Health   Alberto Berumen 28 y.o. male MRN: [id number]  Unit/Bed#: [facility] 108-02 Encounter: [id number]      Subjective:     Documentation, nursing notes, medication reconciliation, labs, and vitals reviewed. Patient was seen for continuing care and reviewed with treatment team.  No acute events over the past 24 hours. Per nursing report, there has been no significant behavioral changes,. No medication changes over the past 24 hours.     On evaluation today, patient is in his room laying down while group is in session.  When asked about why patient is not going to group this morning, he has mild irritable undertone and states that he goes to every group.  He reports ongoing AH of voices, and endorses some paranoid thoughts although does not elaborate.  He reports ECT has been helpful.  He denies passive or active SI/HI with plan or intent.  He was visible in common areas of milieu during morning hours and was social with select peers.  He reports sleep and appetite are good.  He reported he was unhappy with breakfast because he did not get what he ordered on his tray.     Continues to tolerate current medications with no adverse effects.     No self-harming/suicidal ideation, plan, or intent upon direct inquiry. No thoughts to harm others.  No agitation or aggression noted. Does not appear overtly manic. Offers no further complaints.       Psychiatric ROS:  Behavior over the last 24 hours: unchanged  Sleep: normal  Appetite: normal  Medication side effects: No   ROS: no complaints, all other systems are negative      Mental Status Evaluation:    Appearance:  casually dressed, marginal hygiene   Behavior:  cooperative, guarded   Speech:  normal rate, normal volume, normal pitch   Mood:  mildly irritable   Affect:  constricted   Thought Process:  coherent, goal directed   Associations: intact associations   Thought Content:  some paranoia   Perceptual Disturbances: vague auditory  hallucinations, denies visual hallucinations when asked   Risk Potential: Suicidal ideation - None at present  Homicidal ideation - None at present  Potential for aggression - Not at present   Sensorium:  oriented to person, place, and time/date   Memory:  recent and remote memory grossly intact   Consciousness:  alert and awake   Attention/Concentration: attention span and concentration are age appropriate   Insight:  fair   Judgment: fair   Gait/Station: normal gait/station, normal balance   Motor Activity: no abnormal movements       Vital signs in last 24 hours:    Temp:  [97.6 °F (36.4 °C)-98.3 °F (36.8 °C)] 98.3 °F (36.8 °C)  HR:  [] 95  Resp:  [18-19] 19  BP: (114-157)/(58-86) 125/86    Laboratory results: I have personally reviewed all pertinent laboratory/tests results    Results from the past 24 hours: No results found for this or any previous visit (from the past 24 hour(s)).      Progress Toward Goals: no significant improvement today    Suicide/Homicide Risk Assessment:    Risk of Harm to Self:   Nursing Suicide Risk Assessment Last 24 hours: C-SSRS Risk (Since Last Contact)  Calculated C-SSRS Risk Score (Since Last Contact): No Risk Indicated    Risk of Harm to Others:  Nursing Homicide Risk Assessment: Violence Risk to Others: Denies within past 6 months    Assessment & Plan   Principal Problem:    Schizoaffective disorder, bipolar type (HCC)  Active Problems:    GERD (gastroesophageal reflux disease)    Medical clearance for psychiatric admission    Tobacco abuse    T wave inversion in EKG    Chronic idiopathic constipation    Confluent and reticulate papillomatosis    Primary hypertension    Elevated hemoglobin A1c    Bilateral lower extremity edema      Recommended Treatment:     Planned medication and treatment changes:    All current active medications have been reviewed  Encourage group therapy, milieu therapy and occupational therapy  Behavioral Health checks every 7 minutes  Continue  with SLIM medical management as indicated  Disposition planning ongoing    Continue current medications:    Current Facility-Administered Medications   Medication Dose Route Frequency Provider Last Rate    acetaminophen  650 mg Oral Q4H PRN Jordan C Holter, DO      acetaminophen  650 mg Oral Q6H PRN HOLLI Lion      aluminum-magnesium hydroxide-simethicone  30 mL Oral Q4H PRN Jordan C Holter, DO      amLODIPine  5 mg Oral Daily HOLLI Lion      ARIPiprazole  15 mg Oral Daily Bora Rosario MD      Artificial Tears  1 drop Both Eyes Q3H PRN Jordan C Holter, DO      atropine  1 drop Sublingual Daily PRN HOLLI Lion      haloperidol lactate  2.5 mg Intramuscular Q4H PRN Max 4/day HOLLI Lion      And    LORazepam  1 mg Intramuscular Q4H PRN Max 4/day HOLLI Lion      And    benztropine  0.5 mg Intramuscular Q4H PRN Max 4/day HOLLI Lion      benztropine  1 mg Intramuscular Q4H PRN Max 6/day Jordan C Holter, DO      haloperidol lactate  5 mg Intramuscular Q4H PRN Max 4/day HOLLI Lion      And    LORazepam  2 mg Intramuscular Q4H PRN Max 4/day HOLLI Lion      And    benztropine  1 mg Intramuscular Q4H PRN Max 4/day HOLLI Lion      benztropine  1 mg Oral Q4H PRN Max 6/day HOLLI Lion      benztropine  1 mg Oral Q4H PRN Max 6/day Jordan C Holter, DO      bisacodyl  10 mg Rectal Daily PRN HOLLI Lion      cloZAPine  500 mg Oral HS Bora Rosario MD      cyanocobalamin  1,000 mcg Oral Daily HOLLI Galvan      hydrOXYzine HCL  50 mg Oral Q6H PRN Max 4/day Jordan C Holter, DO      Or    diphenhydrAMINE  50 mg Intramuscular Q6H PRN Jordan C Holter, DO      hydrOXYzine HCL  50 mg Oral Q6H PRN Max 4/day HOLLI Lion      Or    diphenhydrAMINE  50 mg Intramuscular Q6H PRN HOLLI Lion      diphenhydrAMINE-zinc acetate   Topical BID PRN HOLLI Lion      escitalopram  20 mg Oral Daily HOLLI Lion      fluticasone  1  spray Each Nare Daily Brina Guillen MD      glycopyrrolate  1 mg Oral TID Bora Rosario MD      haloperidol  1 mg Oral Q6H PRN HOLLI Lion      haloperidol  2.5 mg Oral Q4H PRN Max 4/day HOLLI Lion      haloperidol  5 mg Oral Q4H PRN Max 4/day HOLLI Lion      hydroCHLOROthiazide  12.5 mg Oral Daily HOLLI Galvan      hydrocortisone   Topical 4x Daily PRN HOLLI Lion      hydrOXYzine HCL  100 mg Oral Q6H PRN Max 4/day Ion C Holter, DO      Or    LORazepam  2 mg Intramuscular Q6H PRN Ion C Holter, DO      hydrOXYzine HCL  100 mg Oral Q6H PRN Max 4/day HOLLI Lion      Or    LORazepam  2 mg Intramuscular Q6H PRN HOLLI Lion      hydrOXYzine HCL  25 mg Oral Q6H PRN Max 4/day Ion C Holter, DO      ibuprofen  600 mg Oral Q8H PRN HOLLI Lion      lactated ringers  75 mL/hr Intravenous Continuous Anjel Arriaza CRNA Stopped (06/21/24 0729)    lactated ringers  50 mL/hr Intravenous Continuous Ramya Arana MD Stopped (06/21/24 0729)    lactated ringers  50 mL/hr Intravenous Continuous Ramya Arana MD Stopped (06/21/24 0729)    loratadine  10 mg Oral Daily Brina Guillen MD      melatonin  3 mg Oral HS Ion C Holter, DO      metFORMIN  500 mg Oral BID With Meals HOLLI Galvan      methocarbamol  500 mg Oral Q6H PRN HOLLI Lion      nicotine polacrilex  2 mg Oral Q4H PRN Bora Rosario MD      OLANZapine  5 mg Oral Q4H PRN Max 3/day Ion C Holter, DO      Or    OLANZapine  2.5 mg Intramuscular Q4H PRN Max 3/day Ion C Holter, DO      OLANZapine  5 mg Oral Q3H PRN Max 3/day Ion C Holter, DO      Or    OLANZapine  5 mg Intramuscular Q3H PRN Max 3/day Ion C Holter, DO      OLANZapine  2.5 mg Oral Q4H PRN Max 6/day Ion C Holter, DO      ondansetron  4 mg Oral Q6H PRN HOLLI Lion      polyethylene glycol  17 g Oral Daily HOLLI Lion      polyethylene glycol  17 g Oral Daily PRN Jordan C Holter, DO       propranolol  10 mg Oral Q12H [facility] HOLLI Lion      senna-docusate sodium  1 tablet Oral Daily PRN Jordan C Holter, DO      senna-docusate sodium  2 tablet Oral BID HOLLI Lion      traZODone  50 mg Oral HS PRN HOLLI Lion      white petrolatum-mineral oil   Topical TID PRN HOLLI Lion           Risks / Benefits of Treatment:    Risks, benefits, and possible side effects of medications explained to patient and patient verbalizes understanding and agreement for treatment.    Counseling / Coordination of Care:    Patient's progress discussed with staff in treatment team meeting.  Medications, treatment progress and treatment plan reviewed with patient.    Note Share    This note was not shared with the patient due to reasonable likelihood of causing patient harm    HOLLI Torres 06/30/24

## 2024-06-30 NOTE — NURSING NOTE
Pt is visible on the unit and social with select peers. Consumed 100% of dinner. Took medications without incidence. Pt is polite and cooperative. Reports AH that tell him they are going to kill him when he leaves here but states he can manage it. No behavioral issues. Pt offers no concerns or complaints. VSS. Continuous safety checks maintained.

## 2024-07-01 PROCEDURE — 99232 SBSQ HOSP IP/OBS MODERATE 35: CPT | Performed by: PSYCHIATRY & NEUROLOGY

## 2024-07-01 RX ADMIN — NICOTINE POLACRILEX 2 MG: 2 GUM, CHEWING ORAL at 08:18

## 2024-07-01 RX ADMIN — PROPRANOLOL HYDROCHLORIDE 10 MG: 10 TABLET ORAL at 21:25

## 2024-07-01 RX ADMIN — MELATONIN TAB 3 MG 3 MG: 3 TAB at 21:25

## 2024-07-01 RX ADMIN — AMLODIPINE BESYLATE 5 MG: 5 TABLET ORAL at 08:17

## 2024-07-01 RX ADMIN — NICOTINE POLACRILEX 2 MG: 2 GUM, CHEWING ORAL at 21:25

## 2024-07-01 RX ADMIN — SENNOSIDES AND DOCUSATE SODIUM 2 TABLET: 8.6; 5 TABLET ORAL at 08:17

## 2024-07-01 RX ADMIN — CLOZAPINE 500 MG: 100 TABLET ORAL at 21:24

## 2024-07-01 RX ADMIN — HYDROCHLOROTHIAZIDE 12.5 MG: 12.5 TABLET ORAL at 08:17

## 2024-07-01 RX ADMIN — GLYCOPYRROLATE 1 MG: 1 TABLET ORAL at 21:24

## 2024-07-01 RX ADMIN — FLUTICASONE PROPIONATE 1 SPRAY: 50 SPRAY, METERED NASAL at 08:55

## 2024-07-01 RX ADMIN — CYANOCOBALAMIN TAB 1000 MCG 1000 MCG: 1000 TAB at 08:17

## 2024-07-01 RX ADMIN — LORATADINE 10 MG: 10 TABLET ORAL at 08:17

## 2024-07-01 RX ADMIN — SENNOSIDES AND DOCUSATE SODIUM 2 TABLET: 8.6; 5 TABLET ORAL at 17:17

## 2024-07-01 RX ADMIN — GLYCOPYRROLATE 1 MG: 1 TABLET ORAL at 08:17

## 2024-07-01 RX ADMIN — PROPRANOLOL HYDROCHLORIDE 10 MG: 10 TABLET ORAL at 08:17

## 2024-07-01 RX ADMIN — METFORMIN HYDROCHLORIDE 500 MG: 500 TABLET, FILM COATED ORAL at 08:17

## 2024-07-01 RX ADMIN — NICOTINE POLACRILEX 2 MG: 2 GUM, CHEWING ORAL at 17:17

## 2024-07-01 RX ADMIN — ESCITALOPRAM OXALATE 20 MG: 10 TABLET, FILM COATED ORAL at 08:17

## 2024-07-01 RX ADMIN — POLYETHYLENE GLYCOL 3350 17 G: 17 POWDER, FOR SOLUTION ORAL at 08:17

## 2024-07-01 RX ADMIN — METFORMIN HYDROCHLORIDE 500 MG: 500 TABLET, FILM COATED ORAL at 17:17

## 2024-07-01 RX ADMIN — ARIPIPRAZOLE 15 MG: 15 TABLET ORAL at 08:17

## 2024-07-01 RX ADMIN — GLYCOPYRROLATE 1 MG: 1 TABLET ORAL at 17:17

## 2024-07-01 NOTE — NURSING NOTE
Patient slept well throughout the night ( 8 hours) No signs of distress noted. Eyes closed and chest movements noted.

## 2024-07-01 NOTE — PLAN OF CARE
Problem: Alteration in Thoughts and Perception  Goal: Agree to be compliant with medication regime, as prescribed and report medication side effects  Description: Interventions:  - Offer appropriate PRN medication and supervise ingestion; conduct AIMS, as needed   Outcome: Progressing  Goal: Attend and participate in unit activities, including therapeutic, recreational, and educational groups  Description: Interventions:  -Encourage Visitation and family involvement in care  Outcome: Progressing     Problem: Electroconvulsive therapy (ECT)  Goal: Achieves stable or improved neurological status  Description: INTERVENTIONS  - Monitor and report changes in neurological status  - Monitor vital signs such as temperature, blood pressure, glucose, and any other labs ordered   - Initiate measures to prevent increased intracranial pressure  - Monitor for seizure activity and implement precautions if appropriate      Outcome: Progressing  Goal: Achieves maximal functionality and self care  Description: INTERVENTIONS  - Monitor swallowing and airway patency with patient fatigue and changes in neurological status  - Encourage and assist patient to increase activity and self care.   - Encourage visually impaired, hearing impaired and aphasic patients to use assistive/communication devices  Outcome: Progressing  Goal: Maintain or return mobility to safest level of function  Description: INTERVENTIONS:  - Assess patient's ability to carry out ADLs; assess patient's baseline for ADL function and identify physical deficits which impact ability to perform ADLs (bathing, care of mouth/teeth, toileting, grooming, dressing, etc.)  - Assess/evaluate cause of self-care deficits   - Assess range of motion  - Assess patient's mobility  - Assess patient's need for assistive devices and provide as appropriate  - Encourage maximum independence but intervene and supervise when necessary  - Involve family in performance of ADLs  - Assess for  home care needs following discharge   - Consider OT consult to assist with ADL evaluation and planning for discharge  - Provide patient education as appropriate  Outcome: Progressing  Goal: Absence of urinary retention  Description: INTERVENTIONS:  - Assess patient’s ability to void and empty bladder  - Monitor I/O  - Bladder scan as needed  - Discuss with physician/AP medications to alleviate retention as needed  - Discuss catheterization for long term situations as appropriate  Outcome: Progressing  Goal: Minimal or absence of nausea and/or vomiting  Description: INTERVENTIONS:  - Administer IV fluids if ordered to ensure adequate hydration  - Maintain NPO status until nausea and vomiting are resolved  - Nasogastric tube if ordered  - Administer ordered antiemetic medications as needed  - Provide nonpharmacologic comfort measures as appropriate  - Advance diet as tolerated, if ordered  - Consider nutrition services referral to assist patient with adequate nutrition and appropriate food choices  Outcome: Progressing  Goal: Maintains adequate nutritional intake  Description: INTERVENTIONS:  - Monitor percentage of each meal consumed  - Identify factors contributing to decreased intake, treat as appropriate  - Assist with meals as needed  - Monitor I&O, weight, and lab values if indicated  - Obtain nutrition services referral as needed  Outcome: Progressing

## 2024-07-01 NOTE — SOCIAL WORK
SW met 1:1 with pt  Pt reports that he's doing ok today and feeling more positive overall  Pt denied any current concerns but reports voices are about the same  Pt requested a virtual visit with his brother-in-law tonight as his sister has been such a support for him. Virtual visit set up for 6:30pm with family member.  Pt also requested SW coordinate with pt's aunt regarding desire for an in-person visit in the near future.   SW encouraged continued participation and communication regarding voices/feelings/needs.

## 2024-07-01 NOTE — NURSING NOTE
Pt is present on the milieu and social with select peers. He consumed 100% of breakfast and lunch. Took his medications without incidence. Nicorette gum given at 0818. Denied psychiatric symptoms except AH. No behavioral issues.

## 2024-07-01 NOTE — SOCIAL WORK
BRANDY placed call to pt's aunt Hanh, attempting to set up in person visit at his request.   BRANDY left vm requesting a call back.

## 2024-07-01 NOTE — PROGRESS NOTES
Psychiatry Progress Note [facility]    Alberto Berumen 28 y.o. male MRN: [id number]  Unit/Bed#: [facility] 108-02 Encounter: [id number]  Code Status: Level 1 - Full Code    PCP: Joce Juan MD    Date of Admission:  3/29/2024 2008   Date of Service:  07/01/24    Patient Active Problem List   Diagnosis    GERD (gastroesophageal reflux disease)    Medical clearance for psychiatric admission    Schizoaffective disorder, bipolar type (HCC)    Tobacco abuse    T wave inversion in EKG    Syringoma    Chronic idiopathic constipation    Vitamin B 12 deficiency    Vitamin D deficiency    Confluent and reticulate papillomatosis    Class 2 obesity in adult    Primary hypertension    Elevated hemoglobin A1c    Bilateral lower extremity edema     Review of systems: Unremarkable  Psychiatric diagnosis: Schizoaffective bipolar  assessment  Overall Status: Reports voices are still threatening but he is not too much in his head control so far if she does not mind  certification Statement: The patient will continue to require additional inpatient hospital stay due to ongoing voices that are threatening to kill him and his family lack of response to medications and ECT so far.     Medications: Clozapine 500 mg at bedtime, propranolol 10 mg every 12 hours as as needed for anxiety, Abilify 15 mg mg at bedtime Lexapro 20 mg once a day, atropine eyedrops sublingual for drooling of saliva and senna 2 tablets twice a day for constipation  All medications reviewed and recommend they be continued for symptom management   side effects from treatment: None reported  Medication changes   None today  Medication education   Risks side effects benefits and precautions of medications discussed with patient and he did verbalize an understanding about risks for metabolic syndrome from being on neuroleptics and is form tardive dyskinesia etc. and special precautions about being on clozapine   Understanding of medications: Has some  understanding   Justification for dual anti-psychotics: Not applicable    Non-pharmacological treatments  Continue with individual, group, milieu and occupational therapy using recovery principles and psycho-education about accepting illness and the need for treatment.   to contact aunt and explore ACT team referral which he never had  ECT to be continued  q 2 weekly for maintenance x 6  Refuses to see a dietician and agrees to do more exercises as he is about 100 lbs overweight   Prolactin level high so Risperdal replaced with Abilify    Safety  Safety and communication plan established to target dynamic risk factors discussed above.    Discharge Plan   Back to his aunt with an ACT team    Interval Progress   Patient continues to report threatening voices to kill him and his family when he gets out but they are not commanding at this time and he reports the passive death wishes are almost gone and he insists he is not suicidal and able to contract for safety.  Still has a flat to constricted affect and he claims voices are still threatening but he is not too much in his head according to him.  He is making some progress.  No behavioral PRNs needed lately not aggressive or agitated or threatening or self-abusive lately.  Still somewhat evasive guarded with a constricted affect compliant with medications hoping to continue with ECT every 2 weeks    I Acceptance by patient: Accepting  Hopefulness in recovery: Living with his aunt and sister  Involved in reintegration process: Talking to his aunt and sister  Trusting in relationship with psychiatrist: Appearing to trust  Sleep: Good  Appetite: Good  Compliance with Medications: Good  Group attendance: Some groups  Significant events: Still with some threatening voices that make him feel depressed  Mental Status Exam  Appearance: age appropriate, improved grooming, looks older than stated age, overweight, with hair in dreadlocks casually dressed with a  clean shaven face, fairly groomed with good eye contact dressed in hospital clothing as usual found sitting on his bed in his room with constricted affect with good eye contact  behavior: cooperative, mildly anxious, evasive, gesturing, slow responses.   speech: normal rate, normal volume, normal pitch  Mood: dysphoric, anxious, continues to report feeling good with the ECT   affect: constricted, inappropriate, mood-congruent depressed sad with low self-esteem  Thought Process: organized, logical, coherent, goal directed, linear, decreased rate of thoughts, slowing of thoughts, negative thinking, impaired abstract reasoning, concrete  Thought Content: paranoid ideation, some paranoia, grandiose ideas, intrusive thoughts, preoccupied, chronic, continues to report paranoia about people threatening to kill him and his family because of the voices.  He believes ECT has helped so that he is not much in his head.  No other delusions elicited.  No current suicidal or homicidal thoughts and no plans verbalized but today reports having passive death wishes because of voices with no plans and able to CFS and it has not changed yet  .  No phobias obsessions compulsions or distorted body perceptions reported.  Preoccupied with wanting to get ECTs more often despite reminding him that it may impair his memory and finally he agreed to such as it is to every 2 weeks  Perceptual Disturbances: Still reports same threatening voices to kill him and his family that makes him feel sad but not to the point of suicide  Hx Risk Factors: chronic psychiatric problems, chronic anxiety symptoms, chronic psychotic symptoms,    Sensorium: Oriented x 3 spheres and situation  Cognition: recent and remote memory grossly intact  Consciousness: alert and awake  Attention: attention span and concentration are age appropriate  Intellect: appears to be of average intelligence  Insight: intact  Judgement: intact  Motor Activity: no abnormal movements      Vitals  Temp:  [97.9 °F (36.6 °C)-98.4 °F (36.9 °C)] 98.4 °F (36.9 °C)  HR:  [] 84  Resp:  [18] 18  BP: (120-130)/(66-85) 120/66  SpO2:  [99 %-100 %] 100 %  No intake or output data in the 24 hours ending 07/01/24 1027              Lab Results: All Labs For Current Hospital Admission Reviewed     Current Facility-Administered Medications   Medication Dose Route Frequency Provider Last Rate    acetaminophen  650 mg Oral Q4H PRN Jordan C Holter, DO      acetaminophen  650 mg Oral Q6H PRN STEVE LionNP      aluminum-magnesium hydroxide-simethicone  30 mL Oral Q4H PRN Jordan C Holter, DO      amLODIPine  5 mg Oral Daily STEVE LionNP      ARIPiprazole  15 mg Oral Daily Bora Rosario MD      Artificial Tears  1 drop Both Eyes Q3H PRN Jordan C Holter, DO      atropine  1 drop Sublingual Daily PRN HOLLI Lion      haloperidol lactate  2.5 mg Intramuscular Q4H PRN Max 4/day STEVE LionNP      And    LORazepam  1 mg Intramuscular Q4H PRN Max 4/day STEVE LionNP      And    benztropine  0.5 mg Intramuscular Q4H PRN Max 4/day STEVE LionNP      benztropine  1 mg Intramuscular Q4H PRN Max 6/day Jordan C Holter, DO      haloperidol lactate  5 mg Intramuscular Q4H PRN Max 4/day STEVE LionNP      And    LORazepam  2 mg Intramuscular Q4H PRN Max 4/day STEVE LionNP      And    benztropine  1 mg Intramuscular Q4H PRN Max 4/day HOLLI Lion      benztropine  1 mg Oral Q4H PRN Max 6/day HOLLI Lion      benztropine  1 mg Oral Q4H PRN Max 6/day Jordan C Holter, DO      bisacodyl  10 mg Rectal Daily PRN STEVE LionNP      cloZAPine  500 mg Oral HS Bora Rosario MD      cyanocobalamin  1,000 mcg Oral Daily HOLLI Galvan      hydrOXYzine HCL  50 mg Oral Q6H PRN Max 4/day Ion C Holter, DO      Or    diphenhydrAMINE  50 mg Intramuscular Q6H PRN Ion FISCHER Holter, DO      hydrOXYzine HCL  50 mg Oral Q6H PRN Max 4/day HOLLI Lion      Or     diphenhydrAMINE  50 mg Intramuscular Q6H PRN HOLLI Lion      diphenhydrAMINE-zinc acetate   Topical BID PRN HOLLI Lion      escitalopram  20 mg Oral Daily HOLLI Lion      fluticasone  1 spray Each Nare Daily Brina Guillen MD      glycopyrrolate  1 mg Oral TID Bora Rosario MD      haloperidol  1 mg Oral Q6H PRN HOLLI Lion      haloperidol  2.5 mg Oral Q4H PRN Max 4/day HOLLI Lion      haloperidol  5 mg Oral Q4H PRN Max 4/day HOLLI Lion      hydroCHLOROthiazide  12.5 mg Oral Daily HOLLI Galvan      hydrocortisone   Topical 4x Daily PRN HOLLI Lion      hydrOXYzine HCL  100 mg Oral Q6H PRN Max 4/day Ion Dupontter, DO      Or    LORazepam  2 mg Intramuscular Q6H PRN Ion Dupontter, DO      hydrOXYzine HCL  100 mg Oral Q6H PRN Max 4/day HOLLI Lion      Or    LORazepam  2 mg Intramuscular Q6H PRN EvelineHOLLI Christy      hydrOXYzine HCL  25 mg Oral Q6H PRN Max 4/day Ion FISCHER Holter, DO      ibuprofen  600 mg Oral Q8H PRN HOLLI Lion      lactated ringers  75 mL/hr Intravenous Continuous Anjel Arriaza CRNA Stopped (06/21/24 0729)    lactated ringers  50 mL/hr Intravenous Continuous Ramya Arana MD Stopped (06/21/24 0729)    lactated ringers  50 mL/hr Intravenous Continuous Ramya Arana MD Stopped (06/21/24 0729)    loratadine  10 mg Oral Daily Brina Guillen MD      melatonin  3 mg Oral HS Ion FISCHER Holter, DO      metFORMIN  500 mg Oral BID With Meals HOLLI Galvan      methocarbamol  500 mg Oral Q6H PRN HOLLI Lion      nicotine polacrilex  2 mg Oral Q4H PRN Bora Rosario MD      OLANZapine  5 mg Oral Q4H PRN Max 3/day Ion FISCHER Holter, DO      Or    OLANZapine  2.5 mg Intramuscular Q4H PRN Max 3/day Ion FISCHER Holter, DO      OLANZapine  5 mg Oral Q3H PRN Max 3/day Ion FISCHER Holter, DO      Or    OLANZapine  5 mg Intramuscular Q3H PRN Max 3/day Ion FISCHER Holter, DO      OLANZapine  2.5 mg Oral Q4H PRN Max  6/day Jordan C Holter, DO      ondansetron  4 mg Oral Q6H PRN HOLLI Lion      polyethylene glycol  17 g Oral Daily HOLLI Lion      polyethylene glycol  17 g Oral Daily PRN Jordan C Holter, DO      propranolol  10 mg Oral Q12H [facility] HOLLI Lion      senna-docusate sodium  1 tablet Oral Daily PRN Jordan C Holter, DO      senna-docusate sodium  2 tablet Oral BID HOLLI Lion      traZODone  50 mg Oral HS PRN HOLLI Lion      white petrolatum-mineral oil   Topical TID PRN HOLLI Lion         Counseling / Coordination of Care: Total floor / unit time spent today 15 minutes. Greater than 50% of total time was spent with the patient and / or family counseling and / or somewhat receptive to supportive listening and teaching positive coping skills to deal with symptom mangement.     Patient's Rights, confidentiality and exceptions to confidentiality, use of automated medical record, Behavioral Health Services staff access to medical record, and consent to treatment reviewed.    This note has been dictated and hence there may be problems with punctuation, spelling and formatting and if anyone has any concerns please address them to Dr. Rosario   This note is not shared with patient due to potential for making patient's condition worse by knowing the content of the note.

## 2024-07-01 NOTE — PROGRESS NOTES
07/01/24 0746   Team Meeting   Meeting Type Daily Rounds   Team Members Present   Team Members Present Physician;Nurse;;Other (Discipline and Name)   Patient/Family Present   Patient Present No   Patient's Family Present No     In attendance:  MD Eveline Gamino, HOLLI Taylor, MIGUEL Alvarado, [facility]  MATILDA Daneil.S.    Groups: 7/9    Pt had labs completed Friday. Pt reports ongoing voices threatening to kill him. ECT Friday. No bx issues noted

## 2024-07-01 NOTE — PLAN OF CARE
Problem: Ineffective Coping  Goal: Participates in unit activities  Description: Interventions:  - Provide therapeutic environment   - Provide required programming   - Redirect inappropriate behaviors   Outcome: Progressing      1. [facility]  [address]  [place], [place] - [address]  [phone]    See Clinic Full Details  2. [facility] [place]  [facility] [place]  [address]   [place], [place] - [address]  [phone]    See Clinic Full Details  3. [facility]  [address]  [place], [place] - [address]  [phone]    See Clinic Full Details  4. [facility] [place]  [facility] [place]  [address]  [place], [place] - [address]  [phone]    See Clinic Full Details    5. [facility]  [address]  [place], [place] - [address]  [phone]    See Clinic Full Details  6. [facility]  [address] [place], [place] - [address]  [phone]    See Clinic Full Details  7. [facility]  [address] [place], [place] - [address]  [phone]    See Clinic Full Details    8. [facility]  [address], [place] - [address]  [phone]    See Clinic Full Details  9. [facility]  [facility]  [address] [facility] [place] [facility] [address]  [phone]    See Clinic Full Details  10. [facility]  [address], [place] - [address]  [phone]    See Clinic Full Details  11. [facility]  [facility]  [address] [place], [place] - [address]  [phone]    See Clinic Full Details  12. [facility]  [facility]  [address] [facility] [place] [facility] [address]  [phone]

## 2024-07-01 NOTE — PLAN OF CARE
Problem: Ineffective Coping  Goal: Identifies ineffective coping skills  Outcome: Progressing  Goal: Identifies healthy coping skills  Outcome: Progressing  Goal: Demonstrates healthy coping skills  Outcome: Progressing  Goal: Participates in unit activities  Description: Interventions:  - Provide therapeutic environment   - Provide required programming   - Redirect inappropriate behaviors   Outcome: Progressing   Attended 83% of the groups offered last week.

## 2024-07-02 PROCEDURE — 99232 SBSQ HOSP IP/OBS MODERATE 35: CPT | Performed by: PSYCHIATRY & NEUROLOGY

## 2024-07-02 RX ADMIN — GLYCOPYRROLATE 1 MG: 1 TABLET ORAL at 08:08

## 2024-07-02 RX ADMIN — NICOTINE POLACRILEX 2 MG: 2 GUM, CHEWING ORAL at 08:09

## 2024-07-02 RX ADMIN — FLUTICASONE PROPIONATE 1 SPRAY: 50 SPRAY, METERED NASAL at 08:07

## 2024-07-02 RX ADMIN — METFORMIN HYDROCHLORIDE 500 MG: 500 TABLET, FILM COATED ORAL at 08:08

## 2024-07-02 RX ADMIN — CLOZAPINE 500 MG: 100 TABLET ORAL at 21:20

## 2024-07-02 RX ADMIN — METFORMIN HYDROCHLORIDE 500 MG: 500 TABLET, FILM COATED ORAL at 17:13

## 2024-07-02 RX ADMIN — POLYETHYLENE GLYCOL 3350 17 G: 17 POWDER, FOR SOLUTION ORAL at 08:08

## 2024-07-02 RX ADMIN — MELATONIN TAB 3 MG 3 MG: 3 TAB at 21:21

## 2024-07-02 RX ADMIN — ARIPIPRAZOLE 15 MG: 15 TABLET ORAL at 08:08

## 2024-07-02 RX ADMIN — SENNOSIDES AND DOCUSATE SODIUM 2 TABLET: 8.6; 5 TABLET ORAL at 08:08

## 2024-07-02 RX ADMIN — CYANOCOBALAMIN TAB 1000 MCG 1000 MCG: 1000 TAB at 08:08

## 2024-07-02 RX ADMIN — SENNOSIDES AND DOCUSATE SODIUM 2 TABLET: 8.6; 5 TABLET ORAL at 17:12

## 2024-07-02 RX ADMIN — GLYCOPYRROLATE 1 MG: 1 TABLET ORAL at 17:13

## 2024-07-02 RX ADMIN — GLYCOPYRROLATE 1 MG: 1 TABLET ORAL at 21:21

## 2024-07-02 RX ADMIN — LORATADINE 10 MG: 10 TABLET ORAL at 08:08

## 2024-07-02 RX ADMIN — ESCITALOPRAM OXALATE 20 MG: 10 TABLET, FILM COATED ORAL at 08:08

## 2024-07-02 RX ADMIN — HYDROCHLOROTHIAZIDE 12.5 MG: 12.5 TABLET ORAL at 08:08

## 2024-07-02 RX ADMIN — NICOTINE POLACRILEX 2 MG: 2 GUM, CHEWING ORAL at 17:40

## 2024-07-02 RX ADMIN — PROPRANOLOL HYDROCHLORIDE 10 MG: 10 TABLET ORAL at 21:21

## 2024-07-02 RX ADMIN — NICOTINE POLACRILEX 2 MG: 2 GUM, CHEWING ORAL at 21:45

## 2024-07-02 RX ADMIN — PROPRANOLOL HYDROCHLORIDE 10 MG: 10 TABLET ORAL at 08:08

## 2024-07-02 NOTE — PLAN OF CARE
Problem: Alteration in Thoughts and Perception  Goal: Treatment Goal: Gain control of psychotic behaviors/thinking, reduce/eliminate presenting symptoms and demonstrate improved reality functioning upon discharge  Outcome: Progressing  Goal: Verbalize thoughts and feelings  Description: Interventions:  - Promote a nonjudgmental and trusting relationship with the patient through active listening and therapeutic communication  - Assess patient's level of functioning, behavior and potential for risk  - Engage patient in 1 on 1 interactions  - Encourage patient to express fears, feelings, frustrations, and discuss symptoms    - Daniel patient to reality, help patient recognize reality-based thinking   - Administer medications as ordered and assess for potential side effects  - Provide the patient education related to the signs and symptoms of the illness and desired effects of prescribed medications  Outcome: Progressing  Goal: Refrain from acting on delusional thinking/internal stimuli  Description: Interventions:  - Monitor patient closely, per order   - Utilize least restrictive measures   - Set reasonable limits, give positive feedback for acceptable   - Administer medications as ordered and monitor of potential side effects  Outcome: Progressing  Goal: Agree to be compliant with medication regime, as prescribed and report medication side effects  Description: Interventions:  - Offer appropriate PRN medication and supervise ingestion; conduct AIMS, as needed   Outcome: Progressing  Goal: Attend and participate in unit activities, including therapeutic, recreational, and educational groups  Description: Interventions:  -Encourage Visitation and family involvement in care  Outcome: Progressing  Goal: Recognize dysfunctional thoughts, communicate reality-based thoughts at the time of discharge  Description: Interventions:  - Provide medication and psycho-education to assist patient in compliance and developing  insight into his/her illness   Outcome: Progressing  Goal: Complete daily ADLs, including personal hygiene independently, as able  Description: Interventions:  - Observe, teach, and assist patient with ADLS  - Monitor and promote a balance of rest/activity, with adequate nutrition and elimination   Outcome: Progressing     Problem: Ineffective Coping  Goal: Identifies ineffective coping skills  Outcome: Progressing  Goal: Identifies healthy coping skills  Outcome: Progressing  Goal: Demonstrates healthy coping skills  Outcome: Progressing  Goal: Participates in unit activities  Description: Interventions:  - Provide therapeutic environment   - Provide required programming   - Redirect inappropriate behaviors   Outcome: Progressing  Goal: Patient/Family participate in treatment and DC plans  Description: Interventions:  - Provide therapeutic environment  Outcome: Progressing  Goal: Patient/Family verbalizes awareness of resources  Outcome: Progressing     Problem: Depression  Goal: Treatment Goal: Demonstrate behavioral control of depressive symptoms, verbalize feelings of improved mood/affect, and adopt new coping skills prior to discharge  Outcome: Progressing  Goal: Verbalize thoughts and feelings  Description: Interventions:  - Assess and re-assess patient's level of risk   - Engage patient in 1:1 interactions, daily, for a minimum of 15 minutes   - Encourage patient to express feelings, fears, frustrations, hopes   Outcome: Progressing  Goal: Refrain from harming self  Description: Interventions:  - Monitor patient closely, per order   - Supervise medication ingestion, monitor effects and side effects   Outcome: Progressing  Goal: Refrain from isolation  Description: Interventions:  - Develop a trusting relationship   - Encourage socialization   Outcome: Progressing  Goal: Refrain from self-neglect  Outcome: Progressing  Goal: Attend and participate in unit activities, including therapeutic, recreational, and  educational groups  Description: Interventions:  - Provide therapeutic and educational activities daily, encourage attendance and participation, and document same in the medical record   Outcome: Progressing  Goal: Complete daily ADLs, including personal hygiene independently, as able  Description: Interventions:  - Observe, teach, and assist patient with ADLS  -  Monitor and promote a balance of rest/activity, with adequate nutrition and elimination   Outcome: Progressing     Problem: Anxiety  Goal: Anxiety is at manageable level  Description: Interventions:  - Assess and monitor patient's anxiety level.   - Monitor for signs and symptoms (heart palpitations, chest pain, shortness of breath, headaches, nausea, feeling jumpy, restlessness, irritable, apprehensive).   - Collaborate with interdisciplinary team and initiate plan and interventions as ordered.  - [place] patient to unit/surroundings  - Explain treatment plan  - Encourage participation in care  - Encourage verbalization of concerns/fears  - Identify coping mechanisms  - Assist in developing anxiety-reducing skills  - Administer/offer alternative therapies  - Limit or eliminate stimulants  Outcome: Progressing     Problem: Alteration in Orientation  Goal: Treatment Goal: Demonstrate a reduction of confusion and improved orientation to person, place, time and/or situation upon discharge, according to optimum baseline/ability  Outcome: Progressing  Goal: Interact with staff daily  Description: Interventions:  - Assess and re-assess patient's level of orientation  - Engage patient in 1 on 1 interactions, daily, for a minimum of 15 minutes   - Establish rapport/trust with patient   Outcome: Progressing  Goal: Express concerns related to confused thinking related to:  Description: Interventions:  - Encourage patient to express feelings, fears, frustrations, hopes  - Assign consistent caregivers   - [place]/re-orient patient as needed  - Allow comfort items, as  appropriate  - Provide visual cues, signs, etc.   Outcome: Progressing  Goal: Allow medical examinations, as recommended  Description: Interventions:  - Provide physical/neurological exams and/or referrals, per provider   Outcome: Progressing  Goal: Cooperate with recommended testing/procedures  Description: Interventions:  - Determine need for ancillary testing  - Observe for mental status changes  - Implement falls/precaution protocol   Outcome: Progressing  Goal: Attend and participate in unit activities, including therapeutic, recreational, and educational groups  Description: Interventions:  - Provide therapeutic and educational activities daily, encourage attendance and participation, and document same in the medical record   - Provide appropriate opportunities for reminiscence   - Provide a consistent daily routine   - Encourage family contact/visitation   Outcome: Progressing  Goal: Complete daily ADLs, including personal hygiene independently, as able  Description: Interventions:  - Observe, teach, and assist patient with ADLS  Outcome: Progressing     Problem: Electroconvulsive therapy (ECT)  Goal: Treatment Goal: Demonstrate a reduction of confusion and improved orientation to person, place, time and/or situation upon discharge, according to optimum baseline/ability  Outcome: Progressing  Goal: Verbalizes/displays adequate comfort level or baseline comfort level  Description: Interventions:  - Encourage patient to monitor pain and request assistance  - Assess pain using appropriate pain scale  - Administer analgesics based on type and severity of pain and evaluate response  - Implement non-pharmacological measures as appropriate and evaluate response  - Consider cultural and social influences on pain and pain management  - Notify physician/advanced practitioner if interventions unsuccessful or patient reports new pain  Outcome: Progressing  Goal: Achieves stable or improved neurological  status  Description: INTERVENTIONS  - Monitor and report changes in neurological status  - Monitor vital signs such as temperature, blood pressure, glucose, and any other labs ordered   - Initiate measures to prevent increased intracranial pressure  - Monitor for seizure activity and implement precautions if appropriate      Outcome: Progressing  Goal: Achieves maximal functionality and self care  Description: INTERVENTIONS  - Monitor swallowing and airway patency with patient fatigue and changes in neurological status  - Encourage and assist patient to increase activity and self care.   - Encourage visually impaired, hearing impaired and aphasic patients to use assistive/communication devices  Outcome: Progressing  Goal: Maintain or return mobility to safest level of function  Description: INTERVENTIONS:  - Assess patient's ability to carry out ADLs; assess patient's baseline for ADL function and identify physical deficits which impact ability to perform ADLs (bathing, care of mouth/teeth, toileting, grooming, dressing, etc.)  - Assess/evaluate cause of self-care deficits   - Assess range of motion  - Assess patient's mobility  - Assess patient's need for assistive devices and provide as appropriate  - Encourage maximum independence but intervene and supervise when necessary  - Involve family in performance of ADLs  - Assess for home care needs following discharge   - Consider OT consult to assist with ADL evaluation and planning for discharge  - Provide patient education as appropriate  Outcome: Progressing  Goal: Absence of urinary retention  Description: INTERVENTIONS:  - Assess patient’s ability to void and empty bladder  - Monitor I/O  - Bladder scan as needed  - Discuss with physician/AP medications to alleviate retention as needed  - Discuss catheterization for long term situations as appropriate  Outcome: Progressing  Goal: Minimal or absence of nausea and/or vomiting  Description: INTERVENTIONS:  -  Administer IV fluids if ordered to ensure adequate hydration  - Maintain NPO status until nausea and vomiting are resolved  - Nasogastric tube if ordered  - Administer ordered antiemetic medications as needed  - Provide nonpharmacologic comfort measures as appropriate  - Advance diet as tolerated, if ordered  - Consider nutrition services referral to assist patient with adequate nutrition and appropriate food choices  Outcome: Progressing  Goal: Maintains adequate nutritional intake  Description: INTERVENTIONS:  - Monitor percentage of each meal consumed  - Identify factors contributing to decreased intake, treat as appropriate  - Assist with meals as needed  - Monitor I&O, weight, and lab values if indicated  - Obtain nutrition services referral as needed  Outcome: Progressing     Problem: DISCHARGE PLANNING - CARE MANAGEMENT  Goal: Discharge to post-acute care or home with appropriate resources  Description: INTERVENTIONS:  - Conduct assessment to determine patient/family and health care team treatment goals, and need for post-acute services based on payer coverage, community resources, and patient preferences, and barriers to discharge  - Address psychosocial, clinical, and financial barriers to discharge as identified in assessment in conjunction with the patient/family and health care team  - Arrange appropriate level of post-acute services according to patient’s   needs and preference and payer coverage in collaboration with the physician and health care team  - Communicate with and update the patient/family, physician, and health care team regarding progress on the discharge plan  - Arrange appropriate transportation to post-acute venues  Outcome: Progressing

## 2024-07-02 NOTE — NURSING NOTE
Pt is present on milieu. Social with peers, Myles aguilar @ [phone] & [phone]. Polite and cooperative. Denied psychiatric symptoms. No behavioral issues. Compliant with meds. Denies SI/ HI.

## 2024-07-02 NOTE — PROGRESS NOTES
Psychiatry Progress Note [facility]    Alberto Berumen 28 y.o. male MRN: [id number]  Unit/Bed#: [facility] 108-02 Encounter: [id number]  Code Status: Level 1 - Full Code    PCP: Joce Juan MD    Date of Admission:  3/29/2024 2008   Date of Service:  07/02/24    Patient Active Problem List   Diagnosis    GERD (gastroesophageal reflux disease)    Medical clearance for psychiatric admission    Schizoaffective disorder, bipolar type (HCC)    Tobacco abuse    T wave inversion in EKG    Syringoma    Chronic idiopathic constipation    Vitamin B 12 deficiency    Vitamin D deficiency    Confluent and reticulate papillomatosis    Class 2 obesity in adult    Primary hypertension    Elevated hemoglobin A1c    Bilateral lower extremity edema       Review of systems: Unremarkable  Psychiatric Diagnosis: Schizoaffective bipolar    Assessment  Overall Status: Continues to hear voices threatening to kill him and his family with minimal change in frequency, intensity, and content of voices.   Certification Statement: Additional inpatient hospital stay due to ongoing voices that are threatening to kill him and his family, lack of response to medications and ECT so far.     Medications: Clozapine 500 mg at bedtime, propranolol 10 mg every 12 hours as as needed for anxiety, Abilify 15 mg mg at bedtime Lexapro 20 mg once a day, atropine eyedrops sublingual for drooling of saliva and senna 2 tablets twice a day for constipation  All medications reviewed and recommend they be continued for symptom management     Side effects from treatment: None reported  Medication changes   None today   Medication education   Risks side effects benefits and precautions of medications discussed with patient and he did verbalize an understanding about risks for metabolic syndrome from being on neuroleptics and is form tardive dyskinesia etc.  All medications reviewed and I recommend that they be continued for symptom management    "Understanding of medications: None, when asked his medications are, said \"I don't know.\"   Justification for dual anti-psychotics: Not applicable     Non-pharmacological treatments  Continue with individual, group, milieu and occupational therapy using recovery principles and psycho-education about accepting illness and the need for treatment.   to contact aunt and explore ACT team referral which he never had  ECT to be continued q2 weekly for maintenance x 6  Refuses to see a dietician and agrees to do more exercises as he is about 100 lbs overweight   Prolactin level high so Risperdal replaced with Abilify     Safety  Safety and communication plan established to target dynamic risk factors discussed above.    Discharge Plan   Home with his aunt, ACT team support    Interval Progress   Alberto reports still hearing threatening voices that want to kill him and his family. He tries not to listen to them, but it is a constant and distracting noise. Reports having periods of dissociation (\"my brain isn't mine\") 1-2x a day for 10 minutes at a time. Denies current SI and HI. His affect is flat and constricted. He is making some progress. No behavioral PRNs needed lately - no aggressive, agitated, threatening, nor self-abusive behavior recently. Remains somewhat guarded with a constricted affect. Compliant with medications hoping to continue with ECT every 2 weeks.    Acceptance by patient: Accepting   Hopefulness in recovery: Living with his aunt and sister   Involved in reintegration process: Aunt, sister   Trusting in relationship with psychiatrist: Appears to  Sleep: Good  Appetite: Good  Compliance with Medications: Yes  Group attendance: 8/8 yesterday  Significant events: None reported      Mental Status Exam  Appearance: age appropriate, casually dressed, dressed appropriately, adequate grooming, overweight, wearing a restrepo, combination hospital scrub pants with hooded sweatshirt, crocs, clean-shaven, " hair in dreadlocks, good eye contact  Behavior: cooperative, calm, guarded, evasive, would at times put head down on table on top of his arms but continued to answer questions  Speech: normal rate, normal volume, normal pitch  Mood: dysphoric  Affect: constricted, flat, mood-congruent, emotionally stable  Thought Process: organized, logical, coherent, goal directed, linear, decreased rate of thoughts, negative thinking  Thought Content: some paranoia, intrusive thoughts, expressed a fear of dying, no other delusions (grandiose, bizarre, somatic) reported when aked, denies active SI but expressed passive desire to die to be rid of voices, denies HI  Perceptual Disturbances: auditory hallucinations of voices threatening to kill him and his family, chronic, appears responding to internal stimuli, talks to self at times, denies visual hallucinations when asked, denies olfactory, tactile, taste hallucinations when asked  Hx Risk Factors: chronic psychiatric problems, history of mood disorder, chronic psychotic symptoms  Sensorium: alert and oriented to person, place, time and situation  Cognition: recent and remote memory grossly intact  Consciousness: alert and awake  Attention: normal attention span  decreased concentration  - did not want to give months backwards, misspelled world backwards  Intellect: appears to be of average intelligence  Insight: fair  Judgement: intact  Motor Activity: no abnormal movements     Vitals  Temp:  [97.8 °F (36.6 °C)] 97.8 °F (36.6 °C)  HR:  [] 85  Resp:  [18] 18  BP: (118-162)/(63-80) 118/63  SpO2:  [100 %] 100 %  No intake or output data in the 24 hours ending 07/02/24 0902    Lab Results: All Labs For Current Hospital Admission Reviewed    Current Facility-Administered Medications   Medication Dose Route Frequency Provider Last Rate    acetaminophen  650 mg Oral Q4H PRN Jordan C Holter, DO      acetaminophen  650 mg Oral Q6H PRN HOLLI Lion      aluminum-magnesium  hydroxide-simethicone  30 mL Oral Q4H PRN Jordan C Holter, DO      amLODIPine  5 mg Oral Daily HOLLI Lion      ARIPiprazole  15 mg Oral Daily Bora Rosario MD      Artificial Tears  1 drop Both Eyes Q3H PRN Jordan C Holter, DO      atropine  1 drop Sublingual Daily PRN HOLLI Lion      haloperidol lactate  2.5 mg Intramuscular Q4H PRN Max 4/day STEVE LionNP      And    LORazepam  1 mg Intramuscular Q4H PRN Max 4/day STEVE LionNP      And    benztropine  0.5 mg Intramuscular Q4H PRN Max 4/day HOLLI Lion      benztropine  1 mg Intramuscular Q4H PRN Max 6/day Jordan C Holter, DO      haloperidol lactate  5 mg Intramuscular Q4H PRN Max 4/day STEVE LionNP      And    LORazepam  2 mg Intramuscular Q4H PRN Max 4/day HOLLI Lion      And    benztropine  1 mg Intramuscular Q4H PRN Max 4/day HOLLI Lion      benztropine  1 mg Oral Q4H PRN Max 6/day HOLLI Lion      benztropine  1 mg Oral Q4H PRN Max 6/day Jordan C Holter, DO      bisacodyl  10 mg Rectal Daily PRN HOLLI Lion      cloZAPine  500 mg Oral HS Bora Rosario MD      cyanocobalamin  1,000 mcg Oral Daily HOLLI Galvan      hydrOXYzine HCL  50 mg Oral Q6H PRN Max 4/day Jordan C Holter, DO      Or    diphenhydrAMINE  50 mg Intramuscular Q6H PRN Jordan C Holter, DO      hydrOXYzine HCL  50 mg Oral Q6H PRN Max 4/day HOLLI Lion      Or    diphenhydrAMINE  50 mg Intramuscular Q6H PRN HOLLI Lion      diphenhydrAMINE-zinc acetate   Topical BID PRN HOLLI Lion      escitalopram  20 mg Oral Daily HOLLI Lion      fluticasone  1 spray Each Nare Daily Brina Guillen MD      glycopyrrolate  1 mg Oral TID Bora Rosario MD      haloperidol  1 mg Oral Q6H PRN HOLLI Lion      haloperidol  2.5 mg Oral Q4H PRN Max 4/day HOLLI Lion      haloperidol  5 mg Oral Q4H PRN Max 4/day HOLLI Lion      hydroCHLOROthiazide  12.5 mg Oral Daily Pia Mantilla,  STEVENP      hydrocortisone   Topical 4x Daily PRN HOLLI Lion      hydrOXYzine HCL  100 mg Oral Q6H PRN Max 4/day [facility] Holter, DO      Or    LORazepam  2 mg Intramuscular Q6H PRN [facility] Holter, DO      hydrOXYzine HCL  100 mg Oral Q6H PRN Max 4/day HOLLI Lion      Or    LORazepam  2 mg Intramuscular Q6H PRN HOLLI Lion      hydrOXYzine HCL  25 mg Oral Q6H PRN Max 4/day [facility] Holter, DO      ibuprofen  600 mg Oral Q8H PRN HOLLI Lion      lactated ringers  75 mL/hr Intravenous Continuous Anjel Arriaza CRNA Stopped (06/21/24 0729)    lactated ringers  50 mL/hr Intravenous Continuous Ramya Arana MD Stopped (06/21/24 0729)    lactated ringers  50 mL/hr Intravenous Continuous Ramya Arana MD Stopped (06/21/24 0729)    loratadine  10 mg Oral Daily Brina Guillen MD      melatonin  3 mg Oral HS [facility] Holter, DO      metFORMIN  500 mg Oral BID With Meals HOLLI Galvan      methocarbamol  500 mg Oral Q6H PRN HOLLI Lion      nicotine polacrilex  2 mg Oral Q4H PRN Bora Rosario MD      OLANZapine  5 mg Oral Q4H PRN Max 3/day [facility] Holter, DO      Or    OLANZapine  2.5 mg Intramuscular Q4H PRN Max 3/day [facility] Holter, DO      OLANZapine  5 mg Oral Q3H PRN Max 3/day [facility] Holter, DO      Or    OLANZapine  5 mg Intramuscular Q3H PRN Max 3/day [facility] Holter, DO      OLANZapine  2.5 mg Oral Q4H PRN Max 6/day [facility] Holter, DO      ondansetron  4 mg Oral Q6H PRN HOLLI Lion      polyethylene glycol  17 g Oral Daily HOLLI Lion      polyethylene glycol  17 g Oral Daily PRN [facility] Holter, DO      propranolol  10 mg Oral Q12H KAYLIE HOLLI Lion      senna-docusate sodium  1 tablet Oral Daily PRN [facility] Cresencioter, DO      senna-docusate sodium  2 tablet Oral BID HOLLI Lion      traZODone  50 mg Oral HS PRN HOLLI Lion      white petrolatum-mineral oil   Topical TID PRN HOLLI Lion         Counseling /  Coordination of Care: Total floor / unit time spent today 15 minutes. Greater than 50% of total time was spent with the patient and / or family counseling and / or somewhat receptive to supportive listening and teaching positive coping skills to deal with symptom mangement.     Patient's Rights, confidentiality and exceptions to confidentiality, use of automated medical record, Behavioral Health Services staff access to medical record, and consent to treatment reviewed.    This note has been dictated and hence there may be problems with punctuation, spelling and formatting and if anyone has any concerns please address them to Dr. Rosario   This note is not shared with patient due to potential for making patient's condition worse by knowing the content of the note.

## 2024-07-02 NOTE — PROGRESS NOTES
07/02/24 0701   Team Meeting   Meeting Type Daily Rounds   Team Members Present   Team Members Present Physician;Nurse;;Other (Discipline and Name)   Patient/Family Present   Patient Present No   Patient's Family Present No     In attendance:  MD Eveline Gamino, HOLLI Taylor, MIGUEL Alvarado, [facility]  Carla Lux, [facility]  Irais Mcdowell, M.S.    Groups: 8/8    Pt had a virtual visit with family that went well. Pt reports no changes in voices and exhibits no bx issues.

## 2024-07-03 PROCEDURE — 99232 SBSQ HOSP IP/OBS MODERATE 35: CPT | Performed by: PSYCHIATRY & NEUROLOGY

## 2024-07-03 RX ADMIN — CLOZAPINE 500 MG: 100 TABLET ORAL at 21:14

## 2024-07-03 RX ADMIN — ONDANSETRON 4 MG: 4 TABLET, ORALLY DISINTEGRATING ORAL at 08:47

## 2024-07-03 RX ADMIN — CYANOCOBALAMIN TAB 1000 MCG 1000 MCG: 1000 TAB at 08:11

## 2024-07-03 RX ADMIN — POLYETHYLENE GLYCOL 3350 17 G: 17 POWDER, FOR SOLUTION ORAL at 08:11

## 2024-07-03 RX ADMIN — NICOTINE POLACRILEX 2 MG: 2 GUM, CHEWING ORAL at 21:14

## 2024-07-03 RX ADMIN — METFORMIN HYDROCHLORIDE 500 MG: 500 TABLET, FILM COATED ORAL at 17:03

## 2024-07-03 RX ADMIN — PROPRANOLOL HYDROCHLORIDE 10 MG: 10 TABLET ORAL at 08:10

## 2024-07-03 RX ADMIN — GLYCOPYRROLATE 1 MG: 1 TABLET ORAL at 17:03

## 2024-07-03 RX ADMIN — ARIPIPRAZOLE 15 MG: 15 TABLET ORAL at 08:11

## 2024-07-03 RX ADMIN — FLUTICASONE PROPIONATE 1 SPRAY: 50 SPRAY, METERED NASAL at 08:10

## 2024-07-03 RX ADMIN — PROPRANOLOL HYDROCHLORIDE 10 MG: 10 TABLET ORAL at 21:14

## 2024-07-03 RX ADMIN — GLYCOPYRROLATE 1 MG: 1 TABLET ORAL at 21:14

## 2024-07-03 RX ADMIN — NICOTINE POLACRILEX 2 MG: 2 GUM, CHEWING ORAL at 08:11

## 2024-07-03 RX ADMIN — AMLODIPINE BESYLATE 5 MG: 5 TABLET ORAL at 08:11

## 2024-07-03 RX ADMIN — LORATADINE 10 MG: 10 TABLET ORAL at 08:10

## 2024-07-03 RX ADMIN — NICOTINE POLACRILEX 2 MG: 2 GUM, CHEWING ORAL at 17:04

## 2024-07-03 RX ADMIN — GLYCOPYRROLATE 1 MG: 1 TABLET ORAL at 08:10

## 2024-07-03 RX ADMIN — SENNOSIDES AND DOCUSATE SODIUM 2 TABLET: 8.6; 5 TABLET ORAL at 17:03

## 2024-07-03 RX ADMIN — ESCITALOPRAM OXALATE 20 MG: 10 TABLET, FILM COATED ORAL at 08:10

## 2024-07-03 RX ADMIN — METFORMIN HYDROCHLORIDE 500 MG: 500 TABLET, FILM COATED ORAL at 08:11

## 2024-07-03 RX ADMIN — MELATONIN TAB 3 MG 3 MG: 3 TAB at 21:43

## 2024-07-03 RX ADMIN — NICOTINE POLACRILEX 2 MG: 2 GUM, CHEWING ORAL at 12:33

## 2024-07-03 RX ADMIN — SENNOSIDES AND DOCUSATE SODIUM 2 TABLET: 8.6; 5 TABLET ORAL at 08:11

## 2024-07-03 RX ADMIN — HYDROCHLOROTHIAZIDE 12.5 MG: 12.5 TABLET ORAL at 08:10

## 2024-07-03 NOTE — NURSING NOTE
Pt is present on the milieu with intervals of napping. He consumed 100% of breakfast and lunch. Took his medications without incidence. Nicorette gum given at 0811 and 1233. Pt reported earlier zofran was effective. He spends time going to group and socializing with peers. No behavioral issues.

## 2024-07-03 NOTE — PROGRESS NOTES
07/03/24 0736   Team Meeting   Meeting Type Daily Rounds   Team Members Present   Team Members Present Physician;Nurse;;Other (Discipline and Name)   Patient/Family Present   Patient Present No   Patient's Family Present No     In attendance:  MD Eveline Gamino, HOLLI Alvarado, [facility]  Carla Lux, [facility]  MATILDA Daniel.S.    Groups: 4/9    Pt has been social and present on the unit with no noted bx issues. Next ECT Friday 7/5.

## 2024-07-03 NOTE — PROGRESS NOTES
Psychiatry Progress Note [facility]    Alberto Berumen 28 y.o. male MRN: [id number]  Unit/Bed#: -02 Encounter: [id number]  Code Status: Level 1 - Full Code    PCP: Joce Juan MD    Date of Admission:  3/29/2024 2008   Date of Service:  07/03/24    Patient Active Problem List   Diagnosis    GERD (gastroesophageal reflux disease)    Medical clearance for psychiatric admission    Schizoaffective disorder, bipolar type (HCC)    Tobacco abuse    T wave inversion in EKG    Syringoma    Chronic idiopathic constipation    Vitamin B 12 deficiency    Vitamin D deficiency    Confluent and reticulate papillomatosis    Class 2 obesity in adult    Primary hypertension    Elevated hemoglobin A1c    Bilateral lower extremity edema       Review of systems: Positive nausea/vomiting, auditory hallucinations, depression; Negative: fever, SOB, CP, abd pain  Psychiatric Diagnosis: Schizoaffective bipolar     Assessment  Overall Status: The threatening voices saying they will kill him and his family are unchanged  Certification Statement: The patient will continue to require inpatient hospital stay due to ongoing voices that threaten to kill him and his family, and due to lack of response to medications and ECT thus far        Medications:   Clozapine 500mg at bedtime  Propranolol 10mg every 12 hours as needed for anxiety  Abilify 15mg at bedtime  Lexapro 20mg once a day  Atropine eyedrops sublingual for drooling of saliva  Senna 2 tablets twice a day for constipation  All medications reviewed and recommend they be continued for symptom management.     Side effects from treatment: None reported  Medication changes   None today   Medication education   Risks side effects benefits and precautions of medications discussed with patient and he did verbalize an understanding about risks for metabolic syndrome from being on neuroleptics and is form tardive dyskinesia etc.  All medications reviewed and I  "recommend that they be continued for symptom management   Understanding of medications: Minimal, when asked, pt could not identify medications he's taking   Justification for dual anti-psychotics: Not applicable    Non-pharmacological treatments  Continue with individual, group, milieu and occupational therapy using recovery principles and psycho-education about accepting illness and the need for treatment.   to contact aunken, explore ACT team referral which he's never had  ECT to be continued q2 weekly for maintenance x6  Refuses to see a dietician, agrees to exercise - is about 100 lbs overweight  Prolactin level was high, Risperdal replaced with Abilify    Safety  Safety and communication plan established to target dynamic risk factors discussed above.    Discharge Plan   Home to Aunt with ACT team support     Interval Progress   Alberto reports still hearing threatening voices that want to kill him and his family. Voices are unchanged since yesterday. Continues to have paranoia and a preoccupation with dying. Feels safe on the unit \"sometimes, sometimes not.\" Denies current SI and HI, and denies experiences categorized as delusions (somatic, bizarre, grandiose).     He had nausea/vomiting x1 after breakfast - vomitus was a small amount of food. He thinks it may be due to the coffee. Received Ondansetron 4mg po afterwards, and he reports feeling better during the interview. Slept well overnight.    Continues to have depressed mood, affect remains flat, constricted. No behavioral PRNs were needed - no aggressive, agitated, threatening, nor self-abusive behaviors reported recently. Compliance with medications, next ECT treatment is 7/5/24.       Acceptance by patient: Accepting  Hopefulness in recovery: Positive, yet some moderate indifference  Involved in reintegration process: Aunt, sister  Trusting in relationship with psychiatrist: Appears to  Sleep: Good  Appetite: Good  Compliance with " "Medications: Yes  Group attendance: 4/9 yesterday; pt states he attended more than that and staff are \"not doing their jobs\"  Significant events: Nausea and vomiting x1 after breakfast      Mental Status Exam  Appearance: age appropriate, casually dressed, dressed appropriately, adequate grooming, overweight, wearing a restrepo, pt wearing sweatshirt with restrepo over head, hospital scrub pants, and crocs, hair is in dreadlocks, maintains appropriate eye contact  Behavior: cooperative, calm, guarded, at times answered only with nodding  Speech: normal rate, normal pitch, scant, decreased volume, at times only answered with nodding  Mood: depressed  Affect: constricted, flat, mood-congruent, emotionally stable  Thought Process: coherent, linear, negative thinking  Thought Content: some paranoia, intrusive thoughts, preoccupation with death, no other experiences that are categorized as delusions (grandiose, bizarre, somatic) reported when asked; denies SI, denies HI  Perceptual Disturbances:  chronic auditory hallucinations - voices threatening to kill him and his family, appears to respond to internal stimuli, denies hallucinations in other sensory modalities when asked  Hx Risk Factors: chronic psychiatric problems, history of mood disorder, chronic psychotic symptoms  Sensorium: alert and oriented to person, place, time and situation  Cognition: recent and remote memory grossly intact  Consciousness: alert and awake  Attention:  attention and concentration are grossly intact  Intellect: appears to be of average intelligence  Insight: fair  Judgement: fair  Motor Activity: no abnormal movements     Vitals  Temp:  [97.5 °F (36.4 °C)-97.9 °F (36.6 °C)] 97.9 °F (36.6 °C)  HR:  [] 99  Resp:  [18] 18  BP: (132-144)/(79-85) 137/79  SpO2:  [100 %] 100 %  No intake or output data in the 24 hours ending 07/03/24 1140    Lab Results: All Labs For Current Hospital Admission Reviewed    Results from last 7 days   Lab Units " 06/28/24 2000   WBC Thousand/uL 10.55*   RBC Million/uL 5.45   HEMOGLOBIN g/dL 12.9   HEMATOCRIT % 43.5   MCV fL 80*   PLATELETS Thousands/uL 294   TOTAL NEUT ABS Thousands/µL 5.91   CLOZAPINE LVL ng/mL 420       Current Facility-Administered Medications   Medication Dose Route Frequency Provider Last Rate    acetaminophen  650 mg Oral Q4H PRN Jordan C Holter,       acetaminophen  650 mg Oral Q6H PRN HOLLI Lion      aluminum-magnesium hydroxide-simethicone  30 mL Oral Q4H PRN Jordan C Holter, DO      amLODIPine  5 mg Oral Daily HOLLI Lion      ARIPiprazole  15 mg Oral Daily Bora Rosario MD      Artificial Tears  1 drop Both Eyes Q3H PRN Jordan C Holter, DO      atropine  1 drop Sublingual Daily PRN HOLLI Lion      haloperidol lactate  2.5 mg Intramuscular Q4H PRN Max 4/day HOLLI Lion      And    LORazepam  1 mg Intramuscular Q4H PRN Max 4/day HOLIL Lion      And    benztropine  0.5 mg Intramuscular Q4H PRN Max 4/day HOLLI Lion      benztropine  1 mg Intramuscular Q4H PRN Max 6/day Jordan C Holter, DO      haloperidol lactate  5 mg Intramuscular Q4H PRN Max 4/day HOLLI Lion      And    LORazepam  2 mg Intramuscular Q4H PRN Max 4/day HOLLI Lion      And    benztropine  1 mg Intramuscular Q4H PRN Max 4/day HOLLI Lion      benztropine  1 mg Oral Q4H PRN Max 6/day HOLLI Lion      benztropine  1 mg Oral Q4H PRN Max 6/day Jordan C Holter, DO      bisacodyl  10 mg Rectal Daily PRN HOLLI Lion      cloZAPine  500 mg Oral HS Bora Rosario MD      cyanocobalamin  1,000 mcg Oral Daily HOLLI Galvan      hydrOXYzine HCL  50 mg Oral Q6H PRN Max 4/day Jordan C Holter, DO      Or    diphenhydrAMINE  50 mg Intramuscular Q6H PRN Jordan C Holter, DO      hydrOXYzine HCL  50 mg Oral Q6H PRN Max 4/day HOLLI Lion      Or    diphenhydrAMINE  50 mg Intramuscular Q6H PRN HOLLI Lion      diphenhydrAMINE-zinc acetate    Topical BID PRN HOLLI Lion      escitalopram  20 mg Oral Daily EvelineHOLLI Christy      fluticasone  1 spray Each Nare Daily Brina Guillen MD      glycopyrrolate  1 mg Oral TID Bora Rosario MD      haloperidol  1 mg Oral Q6H PRN Eveline HOLLI Hunt      haloperidol  2.5 mg Oral Q4H PRN Max 4/day Eveline HOLLI Hunt      haloperidol  5 mg Oral Q4H PRN Max 4/day EvelineHOLLI Christy      hydroCHLOROthiazide  12.5 mg Oral Daily HOLLI Galvan      hydrocortisone   Topical 4x Daily PRN EvelineHOLLI Christy      hydrOXYzine HCL  100 mg Oral Q6H PRN Max 4/day Ion C Holter, DO      Or    LORazepam  2 mg Intramuscular Q6H PRN Ion C Holter, DO      hydrOXYzine HCL  100 mg Oral Q6H PRN Max 4/day HOLLI Lion      Or    LORazepam  2 mg Intramuscular Q6H PRN EvelineHOLLI Christy      hydrOXYzine HCL  25 mg Oral Q6H PRN Max 4/day Ion C Holter, DO      ibuprofen  600 mg Oral Q8H PRN HOLLI Lion      lactated ringers  75 mL/hr Intravenous Continuous Anjel Arriaza CRNA Stopped (06/21/24 0729)    lactated ringers  50 mL/hr Intravenous Continuous Ramya Arana MD Stopped (06/21/24 0729)    lactated ringers  50 mL/hr Intravenous Continuous Rayma Arana MD Stopped (06/21/24 0729)    loratadine  10 mg Oral Daily Brina Guillen MD      melatonin  3 mg Oral HS Ion C Holter, DO      metFORMIN  500 mg Oral BID With Meals HOLLI Galvan      methocarbamol  500 mg Oral Q6H PRN HOLLI Lion      nicotine polacrilex  2 mg Oral Q4H PRN Bora Rosario MD      OLANZapine  5 mg Oral Q4H PRN Max 3/day Ion C Holter, DO      Or    OLANZapine  2.5 mg Intramuscular Q4H PRN Max 3/day Ion C Holter, DO      OLANZapine  5 mg Oral Q3H PRN Max 3/day Ion C Holter, DO      Or    OLANZapine  5 mg Intramuscular Q3H PRN Max 3/day Ion C Holter, DO      OLANZapine  2.5 mg Oral Q4H PRN Max 6/day Jordan C Holter, DO      ondansetron  4 mg Oral Q6H PRN HOLLI Lion      polyethylene  glycol  17 g Oral Daily HOLLI Lion      polyethylene glycol  17 g Oral Daily PRN Jordan C Holter, DO      propranolol  10 mg Oral Q12H KAYLIE HOLLI Lion      senna-docusate sodium  1 tablet Oral Daily PRN Jordan C Holter, DO      senna-docusate sodium  2 tablet Oral BID HOLLI Lion      traZODone  50 mg Oral HS PRN HOLLI Lion      white petrolatum-mineral oil   Topical TID PRN HOLLI Lion         Counseling / Coordination of Care: Total floor / unit time spent today 15 minutes. Greater than 50% of total time was spent with the patient and / or family counseling and / or somewhat receptive to supportive listening and teaching positive coping skills to deal with symptom mangement.     Patient's Rights, confidentiality and exceptions to confidentiality, use of automated medical record, Behavioral Health Services staff access to medical record, and consent to treatment reviewed.    This note has been dictated and hence there may be problems with punctuation, spelling and formatting and if anyone has any concerns please address them to Dr. Rosario  This note is not shared with patient due to potential for making patient's condition worse by knowing the content of the note.

## 2024-07-03 NOTE — NURSING NOTE
Patient stated he vomited a small amount in the bathroom after eating breakfast. Patient stated he still felt nauseous. Prn Zofran given.

## 2024-07-03 NOTE — PLAN OF CARE
Problem: Ineffective Coping  Goal: Identifies ineffective coping skills  Outcome: Progressing  Goal: Identifies healthy coping skills  Outcome: Progressing  Goal: Demonstrates healthy coping skills  Outcome: Progressing  Goal: Participates in unit activities  Description: Interventions:  - Provide therapeutic environment   - Provide required programming   - Redirect inappropriate behaviors   Outcome: Progressing    Attended 12/17 groups offered in the past 2 days.

## 2024-07-03 NOTE — PLAN OF CARE
Problem: Alteration in Thoughts and Perception  Goal: Treatment Goal: Gain control of psychotic behaviors/thinking, reduce/eliminate presenting symptoms and demonstrate improved reality functioning upon discharge  Outcome: Progressing  Goal: Verbalize thoughts and feelings  Description: Interventions:  - Promote a nonjudgmental and trusting relationship with the patient through active listening and therapeutic communication  - Assess patient's level of functioning, behavior and potential for risk  - Engage patient in 1 on 1 interactions  - Encourage patient to express fears, feelings, frustrations, and discuss symptoms    - [place] patient to reality, help patient recognize reality-based thinking   - Administer medications as ordered and assess for potential side effects  - Provide the patient education related to the signs and symptoms of the illness and desired effects of prescribed medications  Outcome: Progressing  Goal: Refrain from acting on delusional thinking/internal stimuli  Description: Interventions:  - Monitor patient closely, per order   - Utilize least restrictive measures   - Set reasonable limits, give positive feedback for acceptable   - Administer medications as ordered and monitor of potential side effects  Outcome: Progressing  Goal: Agree to be compliant with medication regime, as prescribed and report medication side effects  Description: Interventions:  - Offer appropriate PRN medication and supervise ingestion; conduct AIMS, as needed   Outcome: Progressing  Goal: Attend and participate in unit activities, including therapeutic, recreational, and educational groups  Description: Interventions:  -Encourage Visitation and family involvement in care  Outcome: Progressing  Goal: Complete daily ADLs, including personal hygiene independently, as able  Description: Interventions:  - Observe, teach, and assist patient with ADLS  - Monitor and promote a balance of rest/activity, with adequate  nutrition and elimination   Outcome: Progressing     Problem: Ineffective Coping  Goal: Participates in unit activities  Description: Interventions:  - Provide therapeutic environment   - Provide required programming   - Redirect inappropriate behaviors   Outcome: Progressing     Problem: Depression  Goal: Treatment Goal: Demonstrate behavioral control of depressive symptoms, verbalize feelings of improved mood/affect, and adopt new coping skills prior to discharge  Outcome: Progressing  Goal: Verbalize thoughts and feelings  Description: Interventions:  - Assess and re-assess patient's level of risk   - Engage patient in 1:1 interactions, daily, for a minimum of 15 minutes   - Encourage patient to express feelings, fears, frustrations, hopes   Outcome: Progressing  Goal: Refrain from harming self  Description: Interventions:  - Monitor patient closely, per order   - Supervise medication ingestion, monitor effects and side effects   Outcome: Progressing  Goal: Refrain from isolation  Description: Interventions:  - Develop a trusting relationship   - Encourage socialization   Outcome: Progressing  Goal: Refrain from self-neglect  Outcome: Progressing  Goal: Attend and participate in unit activities, including therapeutic, recreational, and educational groups  Description: Interventions:  - Provide therapeutic and educational activities daily, encourage attendance and participation, and document same in the medical record   Outcome: Progressing  Goal: Complete daily ADLs, including personal hygiene independently, as able  Description: Interventions:  - Observe, teach, and assist patient with ADLS  -  Monitor and promote a balance of rest/activity, with adequate nutrition and elimination   Outcome: Progressing     Problem: Anxiety  Goal: Anxiety is at manageable level  Description: Interventions:  - Assess and monitor patient's anxiety level.   - Monitor for signs and symptoms (heart palpitations, chest pain, shortness  of breath, headaches, nausea, feeling jumpy, restlessness, irritable, apprehensive).   - Collaborate with interdisciplinary team and initiate plan and interventions as ordered.  - [place] patient to unit/surroundings  - Explain treatment plan  - Encourage participation in care  - Encourage verbalization of concerns/fears  - Identify coping mechanisms  - Assist in developing anxiety-reducing skills  - Administer/offer alternative therapies  - Limit or eliminate stimulants  Outcome: Progressing     Problem: Alteration in Orientation  Goal: Treatment Goal: Demonstrate a reduction of confusion and improved orientation to person, place, time and/or situation upon discharge, according to optimum baseline/ability  Outcome: Progressing  Goal: Attend and participate in unit activities, including therapeutic, recreational, and educational groups  Description: Interventions:  - Provide therapeutic and educational activities daily, encourage attendance and participation, and document same in the medical record   - Provide appropriate opportunities for reminiscence   - Provide a consistent daily routine   - Encourage family contact/visitation   Outcome: Progressing  Goal: Complete daily ADLs, including personal hygiene independently, as able  Description: Interventions:  - Observe, teach, and assist patient with ADLS  Outcome: Progressing

## 2024-07-03 NOTE — PROGRESS NOTES
Psychiatry Progress Note [facility]    Alberto Berumen 28 y.o. male MRN: [id number]  Unit/Bed#: [facility] 108-02 Encounter: [id number]  Code Status: Level 1 - Full Code    PCP: Joce Juan MD    Date of Admission:  3/29/2024 2008   Date of Service:  07/03/24    Patient Active Problem List   Diagnosis    GERD (gastroesophageal reflux disease)    Medical clearance for psychiatric admission    Schizoaffective disorder, bipolar type (HCC)    Tobacco abuse    T wave inversion in EKG    Syringoma    Chronic idiopathic constipation    Vitamin B 12 deficiency    Vitamin D deficiency    Confluent and reticulate papillomatosis    Class 2 obesity in adult    Primary hypertension    Elevated hemoglobin A1c    Bilateral lower extremity edema         Review of systems: Positive nausea/vomiting, auditory hallucinations, depression; Negative: fever, SOB, CP, abd pain  Psychiatric Diagnosis: Schizoaffective bipolar     Assessment  Overall Status: Threatening voices (to kill him and his family) remain unchanged  Certification Statement: The patient continues to require inpatient hospital stay due to ongoing voices that are threatening to kill him and his family, lack of response to medications and ECT thus far        Medications: Clozapine 500 mg at bedtime, propranolol 10 mg every 12 hours as as needed for anxiety, Abilify 15 mg mg at bedtime Lexapro 20 mg once a day, atropine eyedrops sublingual for drooling of saliva and senna 2 tablets twice a day for constipation.  All medications reviewed and recommend they be continued for symptom management.    Side effects from treatment: None reported  Medication changes   None today   Medication education   Risks side effects benefits and precautions of medications discussed with patient and he did verbalize an understanding about risks for metabolic syndrome from being on neuroleptics and is form tardive dyskinesia etc.  All medications reviewed and I recommend that  "they be continued for symptom management   Understanding of medications: Minimal, could not identify medications when asked   Justification for dual anti-psychotics: Not applicable     Non-pharmacological treatments  Continue with individual, group, milieu and occupational therapy using recovery principles and psycho-education about accepting illness and the need for treatment.   to contact  and explore ACT team referral which he never had  ECT to be continued q2 weekly for maintenance x 6  Refuses to see a dietician and agrees to do more exercises as he is about 100 lbs overweight   Prolactin level high so Risperdal replaced with Abilify     Safety  Safety and communication plan established to target dynamic risk factors discussed above.    Discharge Plan   Home to aunt, ACT team support    Interval Progress   Alberto reports still hearing threatening voices that want to kill him and his family. Voices are unchanged since yesterday. Continues to have paranoia and a preoccupation about dying. Feels safe on the unit \"sometimes.\" Denies current SI and HI, and experiences categorized as delusions (somatic, bizarre, grandiose).     He had nausea/vomiting x1 after breakfast this morning, he thinks maybe due to the coffee. Received Ondansetron 4mg PO afterwards, and he reports feeling better during interview. Slept well overnight.     Continues to have depressed mood, affect remains flat, constricted. No behavioral PRNs needed lately - no aggressive, agitated, threatening, nor self-abusive behavior recently. Compliant with medications and next ECT is 7/5/24.      Acceptance by patient: Accepting  Hopefulness in recovery: Positive, but moderate indifference   Involved in reintegration process: Aunt, sister  Trusting in relationship with psychiatrist: Appears to  Sleep: Good  Appetite: Good  Compliance with Medications: Yes  Group attendance: 4/9 yesterday, however pt states he attended more and that staff " "\"are not doing their jobs\"  Significant events: Nausea/vomiting x1 after breakfast      Mental Status Exam  Appearance: age appropriate, casually dressed, dressed appropriately, adequate grooming, overweight, wearing a hooded sweatshirt in combination with hospital scrub pants, crocs, clean-shaven, hair in dreadlocks, good eye contact   Behavior: cooperative, calm, guarded, at times answered only with nodding  Speech: normal rate, normal pitch, scant, decreased volume, at times answered only with nodding  Mood: depressed  Affect: constricted, flat, mood-congruent, emotionally stable  Thought Process: coherent, linear, decreased rate of thoughts, negative thinking  Thought Content: some paranoia, intrusive thoughts, expressed a fear of dying, no other experiences that are categorized as delusions (grandiose, bizarre, somatic) reported when aked, denies SI, denies HI   Perceptual Disturbances: auditory hallucinations - voices threatening to kill him and his family, chronic, appears to be responding to internal stimuli, denies hallucinations in the other sensory modalities when asked  Hx Risk Factors: chronic psychiatric problems, history of mood disorder, chronic psychotic symptoms   Sensorium: alert and oriented to person, place, time and situation   Cognition: recent and remote memory grossly intact  Consciousness: alert and awake  Attention: attention and concentration are grossly intact  Intellect: appears to be of average intelligence  Insight: fair  Judgement: intact  Motor Activity: no abnormal movements     Vitals  Temp:  [97.5 °F (36.4 °C)-97.9 °F (36.6 °C)] 97.9 °F (36.6 °C)  HR:  [] 99  Resp:  [18] 18  BP: (132-144)/(79-85) 137/79  SpO2:  [100 %] 100 %  No intake or output data in the 24 hours ending 07/03/24 0827    Lab Results: All Labs For Current Hospital Admission Reviewed    Results from last 7 days   Lab Units 06/28/24 2000   WBC Thousand/uL 10.55*   RBC Million/uL 5.45   HEMOGLOBIN g/dL 12.9 "   HEMATOCRIT % 43.5   MCV fL 80*   PLATELETS Thousands/uL 294   TOTAL NEUT ABS Thousands/µL 5.91   CLOZAPINE LVL ng/mL 420       Current Facility-Administered Medications   Medication Dose Route Frequency Provider Last Rate    acetaminophen  650 mg Oral Q4H PRN Jordan C Holter, DO      acetaminophen  650 mg Oral Q6H PRN HOLLI Lion      aluminum-magnesium hydroxide-simethicone  30 mL Oral Q4H PRN Jordan C Holter, DO      amLODIPine  5 mg Oral Daily HOLLI Lion      ARIPiprazole  15 mg Oral Daily Bora Rosario MD      Artificial Tears  1 drop Both Eyes Q3H PRN Jordan C Holter, DO      atropine  1 drop Sublingual Daily PRN HOLLI Lion      haloperidol lactate  2.5 mg Intramuscular Q4H PRN Max 4/day HOLLI Lion      And    LORazepam  1 mg Intramuscular Q4H PRN Max 4/day HOLLI Lion      And    benztropine  0.5 mg Intramuscular Q4H PRN Max 4/day HOLLI Lion      benztropine  1 mg Intramuscular Q4H PRN Max 6/day Jordan C Holter, DO      haloperidol lactate  5 mg Intramuscular Q4H PRN Max 4/day HOLLI Lion      And    LORazepam  2 mg Intramuscular Q4H PRN Max 4/day HOLLI Lion      And    benztropine  1 mg Intramuscular Q4H PRN Max 4/day HOLLI Lion      benztropine  1 mg Oral Q4H PRN Max 6/day HOLLI Lion      benztropine  1 mg Oral Q4H PRN Max 6/day Jordan C Holter, DO      bisacodyl  10 mg Rectal Daily PRN HOLLI Lion      cloZAPine  500 mg Oral HS Broa Rosario MD      cyanocobalamin  1,000 mcg Oral Daily HOLLI Galvan      hydrOXYzine HCL  50 mg Oral Q6H PRN Max 4/day Jordan C Holter, DO      Or    diphenhydrAMINE  50 mg Intramuscular Q6H PRN Jordan C Holter, DO      hydrOXYzine HCL  50 mg Oral Q6H PRN Max 4/day HOLLI Lion      Or    diphenhydrAMINE  50 mg Intramuscular Q6H PRN HOLLI Lion      diphenhydrAMINE-zinc acetate   Topical BID PRN HOLLI Lion      escitalopram  20 mg Oral Daily HOLLI Lion       fluticasone  1 spray Each Nare Daily Brina Guillen MD      glycopyrrolate  1 mg Oral TID Bora Rosario MD      haloperidol  1 mg Oral Q6H PRN HOLLI Lion      haloperidol  2.5 mg Oral Q4H PRN Max 4/day HOLLI Lion      haloperidol  5 mg Oral Q4H PRN Max 4/day HOLLI Lion      hydroCHLOROthiazide  12.5 mg Oral Daily HOLLI Galvan      hydrocortisone   Topical 4x Daily PRN HOLLI Lion      hydrOXYzine HCL  100 mg Oral Q6H PRN Max 4/day Ion C Holter, DO      Or    LORazepam  2 mg Intramuscular Q6H PRN Ion C Holter, DO      hydrOXYzine HCL  100 mg Oral Q6H PRN Max 4/day HOLLI Lion      Or    LORazepam  2 mg Intramuscular Q6H PRN HOLLI Lion      hydrOXYzine HCL  25 mg Oral Q6H PRN Max 4/day Ion C Holter, DO      ibuprofen  600 mg Oral Q8H PRN HOLLI Lion      lactated ringers  75 mL/hr Intravenous Continuous Anjel Arriaza CRNA Stopped (06/21/24 0729)    lactated ringers  50 mL/hr Intravenous Continuous Ramya Arana MD Stopped (06/21/24 0729)    lactated ringers  50 mL/hr Intravenous Continuous Ramya Arana MD Stopped (06/21/24 0729)    loratadine  10 mg Oral Daily Brina Guillen MD      melatonin  3 mg Oral HS Ion C Holter, DO      metFORMIN  500 mg Oral BID With Meals HOLLI Galvan      methocarbamol  500 mg Oral Q6H PRN HOLLI Lion      nicotine polacrilex  2 mg Oral Q4H PRN Bora Rosario MD      OLANZapine  5 mg Oral Q4H PRN Max 3/day Ion C Holter, DO      Or    OLANZapine  2.5 mg Intramuscular Q4H PRN Max 3/day Ion C Holter, DO      OLANZapine  5 mg Oral Q3H PRN Max 3/day Ion C Holter, DO      Or    OLANZapine  5 mg Intramuscular Q3H PRN Max 3/day Ion C Holter, DO      OLANZapine  2.5 mg Oral Q4H PRN Max 6/day Ion C Holter, DO      ondansetron  4 mg Oral Q6H PRN HOLLI Lion      polyethylene glycol  17 g Oral Daily HOLLI Lion      polyethylene glycol  17 g Oral Daily PRN  Jordan C Holter, DO      propranolol  10 mg Oral Q12H [facility] HOLLI Lion      senna-docusate sodium  1 tablet Oral Daily PRN Jordan C Holter, DO      senna-docusate sodium  2 tablet Oral BID HOLLI Lion      traZODone  50 mg Oral HS PRN HOLLI Lion      white petrolatum-mineral oil   Topical TID PRN HOLLI Lion         Counseling / Coordination of Care: Total floor / unit time spent today 15 minutes. Greater than 50% of total time was spent with the patient and / or family counseling and / or somewhat receptive to supportive listening and teaching positive coping skills to deal with symptom mangement.     Patient's Rights, confidentiality and exceptions to confidentiality, use of automated medical record, Behavioral Health Services staff access to medical record, and consent to treatment reviewed.    This note has been dictated and hence there may be problems with punctuation, spelling and formatting and if anyone has any concerns please address them to Dr. Rosario   This note is not shared with patient due to potential for making patient's condition worse by knowing the content of the note.

## 2024-07-03 NOTE — SOCIAL WORK
SW checked in with pt. Pt reported no current concerns and identified that he's met with several staff members today. Pt observed socializing appropriately with others.

## 2024-07-03 NOTE — NURSING NOTE
Received pt in bed at change of shift with eyes closed; chest movement noted.  Continues the same thus this far as per q 7 min room checks.   Will continue to monitor behavior, sleeping pattern and any medical issues that may arise.    0600:  Sleeping 7+ hrs thus this far

## 2024-07-03 NOTE — NURSING NOTE
Pt is present on milieu, social with select peers. Compliant with meds. No behavorial issues Nicorette gum at 1740 & 2145. Pleasant & cooperative

## 2024-07-04 LAB
BASOPHILS # BLD AUTO: 0.06 THOUSANDS/ÂΜL (ref 0–0.1)
BASOPHILS NFR BLD AUTO: 1 % (ref 0–1)
BNP SERPL-MCNC: 8 PG/ML (ref 0–100)
CK SERPL-CCNC: 195 U/L (ref 39–308)
CRP SERPL QL: 7.1 MG/L
EOSINOPHIL # BLD AUTO: 0.37 THOUSAND/ÂΜL (ref 0–0.61)
EOSINOPHIL NFR BLD AUTO: 4 % (ref 0–6)
ERYTHROCYTE [DISTWIDTH] IN BLOOD BY AUTOMATED COUNT: 14.3 % (ref 11.6–15.1)
HCT VFR BLD AUTO: 42.3 % (ref 36.5–49.3)
HGB BLD-MCNC: 13.2 G/DL (ref 12–17)
IMM GRANULOCYTES # BLD AUTO: 0.04 THOUSAND/UL (ref 0–0.2)
IMM GRANULOCYTES NFR BLD AUTO: 0 % (ref 0–2)
LYMPHOCYTES # BLD AUTO: 3.31 THOUSANDS/ÂΜL (ref 0.6–4.47)
LYMPHOCYTES NFR BLD AUTO: 34 % (ref 14–44)
MCH RBC QN AUTO: 23.9 PG (ref 26.8–34.3)
MCHC RBC AUTO-ENTMCNC: 31.2 G/DL (ref 31.4–37.4)
MCV RBC AUTO: 77 FL (ref 82–98)
MONOCYTES # BLD AUTO: 0.76 THOUSAND/ÂΜL (ref 0.17–1.22)
MONOCYTES NFR BLD AUTO: 8 % (ref 4–12)
NEUTROPHILS # BLD AUTO: 5.2 THOUSANDS/ÂΜL (ref 1.85–7.62)
NEUTS SEG NFR BLD AUTO: 53 % (ref 43–75)
NRBC BLD AUTO-RTO: 0 /100 WBCS
PLATELET # BLD AUTO: 276 THOUSANDS/UL (ref 149–390)
PMV BLD AUTO: 10.3 FL (ref 8.9–12.7)
RBC # BLD AUTO: 5.53 MILLION/UL (ref 3.88–5.62)
WBC # BLD AUTO: 9.74 THOUSAND/UL (ref 4.31–10.16)

## 2024-07-04 PROCEDURE — 85025 COMPLETE CBC W/AUTO DIFF WBC: CPT

## 2024-07-04 PROCEDURE — 83880 ASSAY OF NATRIURETIC PEPTIDE: CPT

## 2024-07-04 PROCEDURE — 82550 ASSAY OF CK (CPK): CPT

## 2024-07-04 PROCEDURE — 99232 SBSQ HOSP IP/OBS MODERATE 35: CPT | Performed by: PHYSICIAN ASSISTANT

## 2024-07-04 PROCEDURE — 86140 C-REACTIVE PROTEIN: CPT

## 2024-07-04 RX ORDER — CALCIUM CARBONATE 500 MG/1
500 TABLET, CHEWABLE ORAL 2 TIMES DAILY PRN
Status: DISCONTINUED | OUTPATIENT
Start: 2024-07-04 | End: 2025-03-14 | Stop reason: HOSPADM

## 2024-07-04 RX ORDER — FAMOTIDINE 20 MG/1
20 TABLET, FILM COATED ORAL 2 TIMES DAILY
Status: DISCONTINUED | OUTPATIENT
Start: 2024-07-04 | End: 2024-10-10

## 2024-07-04 RX ORDER — PANTOPRAZOLE SODIUM 20 MG/1
20 TABLET, DELAYED RELEASE ORAL
Status: DISCONTINUED | OUTPATIENT
Start: 2024-07-04 | End: 2024-08-28

## 2024-07-04 RX ADMIN — NICOTINE POLACRILEX 2 MG: 2 GUM, CHEWING ORAL at 21:22

## 2024-07-04 RX ADMIN — AMLODIPINE BESYLATE 5 MG: 5 TABLET ORAL at 09:00

## 2024-07-04 RX ADMIN — CLOZAPINE 500 MG: 100 TABLET ORAL at 21:22

## 2024-07-04 RX ADMIN — HYDROCHLOROTHIAZIDE 12.5 MG: 12.5 TABLET ORAL at 09:01

## 2024-07-04 RX ADMIN — METFORMIN HYDROCHLORIDE 500 MG: 500 TABLET, FILM COATED ORAL at 09:03

## 2024-07-04 RX ADMIN — SENNOSIDES AND DOCUSATE SODIUM 2 TABLET: 8.6; 5 TABLET ORAL at 09:00

## 2024-07-04 RX ADMIN — GLYCOPYRROLATE 1 MG: 1 TABLET ORAL at 21:22

## 2024-07-04 RX ADMIN — GLYCOPYRROLATE 1 MG: 1 TABLET ORAL at 17:20

## 2024-07-04 RX ADMIN — SENNOSIDES AND DOCUSATE SODIUM 2 TABLET: 8.6; 5 TABLET ORAL at 17:20

## 2024-07-04 RX ADMIN — POLYETHYLENE GLYCOL 3350 17 G: 17 POWDER, FOR SOLUTION ORAL at 09:02

## 2024-07-04 RX ADMIN — PANTOPRAZOLE SODIUM 20 MG: 20 TABLET, DELAYED RELEASE ORAL at 09:47

## 2024-07-04 RX ADMIN — NICOTINE POLACRILEX 2 MG: 2 GUM, CHEWING ORAL at 09:04

## 2024-07-04 RX ADMIN — GLYCOPYRROLATE 1 MG: 1 TABLET ORAL at 09:01

## 2024-07-04 RX ADMIN — ARIPIPRAZOLE 15 MG: 15 TABLET ORAL at 09:00

## 2024-07-04 RX ADMIN — MELATONIN TAB 3 MG 3 MG: 3 TAB at 21:22

## 2024-07-04 RX ADMIN — CYANOCOBALAMIN TAB 1000 MCG 1000 MCG: 1000 TAB at 09:01

## 2024-07-04 RX ADMIN — FLUTICASONE PROPIONATE 1 SPRAY: 50 SPRAY, METERED NASAL at 09:08

## 2024-07-04 RX ADMIN — PROPRANOLOL HYDROCHLORIDE 10 MG: 10 TABLET ORAL at 21:22

## 2024-07-04 RX ADMIN — METFORMIN HYDROCHLORIDE 500 MG: 500 TABLET, FILM COATED ORAL at 17:20

## 2024-07-04 RX ADMIN — NICOTINE POLACRILEX 2 MG: 2 GUM, CHEWING ORAL at 17:20

## 2024-07-04 RX ADMIN — FAMOTIDINE 20 MG: 20 TABLET ORAL at 09:47

## 2024-07-04 RX ADMIN — ONDANSETRON 4 MG: 4 TABLET, ORALLY DISINTEGRATING ORAL at 09:08

## 2024-07-04 RX ADMIN — LORATADINE 10 MG: 10 TABLET ORAL at 09:01

## 2024-07-04 RX ADMIN — FAMOTIDINE 20 MG: 20 TABLET ORAL at 17:20

## 2024-07-04 RX ADMIN — PROPRANOLOL HYDROCHLORIDE 10 MG: 10 TABLET ORAL at 09:01

## 2024-07-04 RX ADMIN — ESCITALOPRAM OXALATE 20 MG: 10 TABLET, FILM COATED ORAL at 09:01

## 2024-07-04 NOTE — PLAN OF CARE
Problem: Alteration in Thoughts and Perception  Goal: Treatment Goal: Gain control of psychotic behaviors/thinking, reduce/eliminate presenting symptoms and demonstrate improved reality functioning upon discharge  Outcome: Progressing  Goal: Verbalize thoughts and feelings  Description: Interventions:  - Promote a nonjudgmental and trusting relationship with the patient through active listening and therapeutic communication  - Assess patient's level of functioning, behavior and potential for risk  - Engage patient in 1 on 1 interactions  - Encourage patient to express fears, feelings, frustrations, and discuss symptoms    - [place] patient to reality, help patient recognize reality-based thinking   - Administer medications as ordered and assess for potential side effects  - Provide the patient education related to the signs and symptoms of the illness and desired effects of prescribed medications  Outcome: Progressing  Goal: Refrain from acting on delusional thinking/internal stimuli  Description: Interventions:  - Monitor patient closely, per order   - Utilize least restrictive measures   - Set reasonable limits, give positive feedback for acceptable   - Administer medications as ordered and monitor of potential side effects  Outcome: Progressing  Goal: Agree to be compliant with medication regime, as prescribed and report medication side effects  Description: Interventions:  - Offer appropriate PRN medication and supervise ingestion; conduct AIMS, as needed   Outcome: Progressing  Goal: Attend and participate in unit activities, including therapeutic, recreational, and educational groups  Description: Interventions:  -Encourage Visitation and family involvement in care  Outcome: Progressing  Goal: Recognize dysfunctional thoughts, communicate reality-based thoughts at the time of discharge  Description: Interventions:  - Provide medication and psycho-education to assist patient in compliance and developing  insight into his/her illness   Outcome: Progressing  Goal: Complete daily ADLs, including personal hygiene independently, as able  Description: Interventions:  - Observe, teach, and assist patient with ADLS  - Monitor and promote a balance of rest/activity, with adequate nutrition and elimination   Outcome: Progressing     Problem: Ineffective Coping  Goal: Identifies ineffective coping skills  Outcome: Progressing  Goal: Identifies healthy coping skills  Outcome: Progressing  Goal: Demonstrates healthy coping skills  Outcome: Progressing  Goal: Participates in unit activities  Description: Interventions:  - Provide therapeutic environment   - Provide required programming   - Redirect inappropriate behaviors   Outcome: Progressing     Problem: Depression  Goal: Treatment Goal: Demonstrate behavioral control of depressive symptoms, verbalize feelings of improved mood/affect, and adopt new coping skills prior to discharge  Outcome: Progressing  Goal: Verbalize thoughts and feelings  Description: Interventions:  - Assess and re-assess patient's level of risk   - Engage patient in 1:1 interactions, daily, for a minimum of 15 minutes   - Encourage patient to express feelings, fears, frustrations, hopes   Outcome: Progressing  Goal: Refrain from harming self  Description: Interventions:  - Monitor patient closely, per order   - Supervise medication ingestion, monitor effects and side effects   Outcome: Progressing  Goal: Refrain from isolation  Description: Interventions:  - Develop a trusting relationship   - Encourage socialization   Outcome: Progressing  Goal: Refrain from self-neglect  Outcome: Progressing  Goal: Attend and participate in unit activities, including therapeutic, recreational, and educational groups  Description: Interventions:  - Provide therapeutic and educational activities daily, encourage attendance and participation, and document same in the medical record   Outcome: Progressing  Goal: Complete  daily ADLs, including personal hygiene independently, as able  Description: Interventions:  - Observe, teach, and assist patient with ADLS  -  Monitor and promote a balance of rest/activity, with adequate nutrition and elimination   Outcome: Progressing     Problem: Anxiety  Goal: Anxiety is at manageable level  Description: Interventions:  - Assess and monitor patient's anxiety level.   - Monitor for signs and symptoms (heart palpitations, chest pain, shortness of breath, headaches, nausea, feeling jumpy, restlessness, irritable, apprehensive).   - Collaborate with interdisciplinary team and initiate plan and interventions as ordered.  - [place] patient to unit/surroundings  - Explain treatment plan  - Encourage participation in care  - Encourage verbalization of concerns/fears  - Identify coping mechanisms  - Assist in developing anxiety-reducing skills  - Administer/offer alternative therapies  - Limit or eliminate stimulants  Outcome: Progressing     Problem: Alteration in Orientation  Goal: Treatment Goal: Demonstrate a reduction of confusion and improved orientation to person, place, time and/or situation upon discharge, according to optimum baseline/ability  Outcome: Progressing  Goal: Interact with staff daily  Description: Interventions:  - Assess and re-assess patient's level of orientation  - Engage patient in 1 on 1 interactions, daily, for a minimum of 15 minutes   - Establish rapport/trust with patient   Outcome: Progressing

## 2024-07-04 NOTE — PROGRESS NOTES
"Progress Note - Behavioral Health     Alberto Berumen 28 y.o. male MRN: [id number]  Unit/Bed#: [facility] 108-02 Encounter: [id number]      Alberto Berumen was seen for continuing care and reviewed with treatment team.  Pt was up for breakfast and seen talking to peers for a while, then went back to his room where I found him for interview.  He was scant, stated his mood was \"Good\" but that he was anxious on some days.  However, he feels his anxiety has been more manageable.  He reported the same AH-- the voice that tells him it will kill him and his family.  He reports the voice is almost constant.   He has paranoia that people may harm him but presently denies depression, SI, HI, CAH, VH, TH, or OH.  Pt feels that \"Some days\" the ECT helps and other times he is not so sure.  He reports some remote memory loss after the ECT, but otherwise willing to continue treatments.  He is sleeping and eating well to which nursing concurred.      Per EMR and floor team, the Pt has been calm, cooperative, medication compliant.  He is visible and social in the milieu, and also attending therapeutic groups with appropriate behavior among peers.    Sleep:Good  Appetite: Good   Medication side effects: None per Pt    ROS: As per HPI, (All else negative)    Labs/Tests: Reviewed    Mental Status Evaluation:  Appearance:  Dressed, clean, minimal eye contact   Behavior:  Calm, cooperative at the most basic level, appeared somewhat guarded   Speech:  Clear, normal rate and volume.  Not very spontaneous, but answered questions readily when asked   Mood:  Anxious   Affect:  Constricted   Thought Process:  Organized, Goal directed   Associations: Intact associations   Thought Content:  Paranoid delusions   Perceptual Disturbances: +AH and paranoia.  He appears paranoid but does not demonstrate RIS    Risk Potential: Pt presently denies SI or HI    Sensorium:  Self, birthday, Place, Day of the week, Month, Year   Memory:  short term memory seems " intact   Consciousness:  alert, awake   Attention: Good   Insight:  Fair   Judgment: Good   Gait/Station: Normal gait/station   Motor Activity: No abnormal movements     Vitals:    07/03/24 0746 07/03/24 1518 07/03/24 2019 07/04/24 0750   BP: 137/79 148/83 150/73 128/79   BP Location: Right arm Right arm Right arm Left arm   Pulse: 99 97 95 82   Resp: 18 18 18 18   Temp: 97.9 °F (36.6 °C) 98 °F (36.7 °C) 97.9 °F (36.6 °C) 97.8 °F (36.6 °C)   TempSrc: Temporal Temporal Temporal Temporal   SpO2: 100% 98% 100% 97%   Weight:       Height:           Labwork: I have personally reviewed all pertinent laboratory/tests results.  Most Recent Labs:   Lab Results   Component Value Date    WBC 10.55 (H) 06/28/2024    RBC 5.45 06/28/2024    HGB 12.9 06/28/2024    HCT 43.5 06/28/2024     06/28/2024    RDW 14.2 06/28/2024    NEUTROABS 5.91 06/28/2024    SODIUM 136 06/03/2024    K 3.8 06/03/2024    CL 96 06/03/2024    CO2 29 06/03/2024    BUN 10 06/03/2024    CREATININE 1.00 06/03/2024    GLUC 97 06/03/2024    GLUF 103 (H) 05/29/2024    CALCIUM 10.1 06/03/2024    AST 33 06/03/2024    ALT 72 (H) 06/03/2024    ALKPHOS 80 06/03/2024    TP 8.5 (H) 06/03/2024    ALB 4.7 06/03/2024    TBILI 0.30 06/03/2024    CHOLESTEROL 156 03/14/2024    HDL 44 03/14/2024    TRIG 133 03/14/2024    LDLCALC 85 03/14/2024    NONHDLC 112 03/14/2024    LITHIUM 0.61 01/09/2024    KFU5LXIPFFFH 1.062 11/15/2023    HGBA1C 5.0 04/01/2024    EAG 97 04/01/2024     EKG   Lab Results   Component Value Date    VENTRATE 100 05/29/2024    ATRIALRATE 100 05/29/2024    PRINT 140 05/29/2024    QRSDINT 90 05/29/2024    QTINT 350 05/29/2024    QTCINT 451 05/29/2024    PAXIS 37 05/29/2024    QRSAXIS 51 05/29/2024    TWAVEAXIS -33 05/29/2024          Progress Toward Goals:  Based on today's interview and review of prior notes, Pt has shown some general improvement during his stay.  Continue the present medication regimen unchanged  Continue maintenance ECT  CBC with  differential, CK, BMP, CRP weekly on Thursdays  Pt remains on dual antipsychotic Tx (Clozapine and Aripiprazole) due to failure on monotherapy        Assessment & Plan   Principal Problem:    Schizoaffective disorder, bipolar type (HCC)  Active Problems:    GERD (gastroesophageal reflux disease)    Medical clearance for psychiatric admission    Tobacco abuse    T wave inversion in EKG    Chronic idiopathic constipation    Confluent and reticulate papillomatosis    Primary hypertension    Elevated hemoglobin A1c    Bilateral lower extremity edema      Recommended Treatment: Continue with pharmacotherapy, group therapy, milieu therapy and occupational therapy.  The patient will be maintained on the following medications:  Current Facility-Administered Medications   Medication Dose Route Frequency Provider Last Rate    acetaminophen  650 mg Oral Q4H PRN Jordan C Holter, DO      acetaminophen  650 mg Oral Q6H PRN HOLLI Lion      aluminum-magnesium hydroxide-simethicone  30 mL Oral Q4H PRN Jordan C Holter,       amLODIPine  5 mg Oral Daily HOLLI Lion      ARIPiprazole  15 mg Oral Daily Bora Rosario MD      Artificial Tears  1 drop Both Eyes Q3H PRN Jordan C Holter, DO      atropine  1 drop Sublingual Daily PRN HOLLI Lion      haloperidol lactate  2.5 mg Intramuscular Q4H PRN Max 4/day STEVE LionNP      And    LORazepam  1 mg Intramuscular Q4H PRN Max 4/day STEVE LionNP      And    benztropine  0.5 mg Intramuscular Q4H PRN Max 4/day STEVE LionNP      benztropine  1 mg Intramuscular Q4H PRN Max 6/day Jordan C Holter, DO      haloperidol lactate  5 mg Intramuscular Q4H PRN Max 4/day STEVE LionNP      And    LORazepam  2 mg Intramuscular Q4H PRN Max 4/day STEVE LionNP      And    benztropine  1 mg Intramuscular Q4H PRN Max 4/day HOLLI Lion      benztropine  1 mg Oral Q4H PRN Max 6/day HOLLI Lion      benztropine  1 mg Oral Q4H PRN Max 6/day Ion FISCHER  Cresencioter, DO      bisacodyl  10 mg Rectal Daily PRN HOLLI Lion      calcium carbonate  500 mg Oral BID PRN Eileen Jensen, HOLLI      cloZAPine  500 mg Oral HS Bora Rosario MD      cyanocobalamin  1,000 mcg Oral Daily HOLLI Galvan      hydrOXYzine HCL  50 mg Oral Q6H PRN Max 4/day Jordan C Holter, DO      Or    diphenhydrAMINE  50 mg Intramuscular Q6H PRN Jordan C Holter, DO      hydrOXYzine HCL  50 mg Oral Q6H PRN Max 4/day HOLLI Lion      Or    diphenhydrAMINE  50 mg Intramuscular Q6H PRN HOLLI Lion      diphenhydrAMINE-zinc acetate   Topical BID PRN HOLLI Lion      escitalopram  20 mg Oral Daily HOLLI Lion      famotidine  20 mg Oral BID HOLLI Monge      fluticasone  1 spray Each Nare Daily Brina Guillen MD      glycopyrrolate  1 mg Oral TID Bora Rosario MD      haloperidol  1 mg Oral Q6H PRN HOLLI Lion      haloperidol  2.5 mg Oral Q4H PRN Max 4/day HOLLI Lion      haloperidol  5 mg Oral Q4H PRN Max 4/day HOLLI Lion      hydroCHLOROthiazide  12.5 mg Oral Daily Pia Mantilla, HOLLI      hydrocortisone   Topical 4x Daily PRN HOLLI Lion      hydrOXYzine HCL  100 mg Oral Q6H PRN Max 4/day Jordan C Holter, DO      Or    LORazepam  2 mg Intramuscular Q6H PRN Jordan C Holter, DO      hydrOXYzine HCL  100 mg Oral Q6H PRN Max 4/day HOLLI Lion      Or    LORazepam  2 mg Intramuscular Q6H PRN HOLLI Lion      hydrOXYzine HCL  25 mg Oral Q6H PRN Max 4/day Jordan C Holter, DO      ibuprofen  600 mg Oral Q8H PRN HOLLI Lion      lactated ringers  75 mL/hr Intravenous Continuous Anjel Arriaza CRNA Stopped (06/21/24 0729)    lactated ringers  50 mL/hr Intravenous Continuous Ramya Arana MD Stopped (06/21/24 0729)    lactated ringers  50 mL/hr Intravenous Continuous Ramya Arana MD Stopped (06/21/24 0458)    loratadine  10 mg Oral Daily Brina Guillen MD      melatonin  3 mg  Oral HS [facility] Holter, DO      metFORMIN  500 mg Oral BID With Meals HOLLI Galvan      methocarbamol  500 mg Oral Q6H PRN HOLLI Lion      nicotine polacrilex  2 mg Oral Q4H PRN Bora Rosario MD      OLANZapine  5 mg Oral Q4H PRN Max 3/day [facility] Holter, DO      Or    OLANZapine  2.5 mg Intramuscular Q4H PRN Max 3/day [facility] Holter, DO      OLANZapine  5 mg Oral Q3H PRN Max 3/day [facility] Holter, DO      Or    OLANZapine  5 mg Intramuscular Q3H PRN Max 3/day [facility] Holter, DO      OLANZapine  2.5 mg Oral Q4H PRN Max 6/day [facility] Cresencioter, DO      ondansetron  4 mg Oral Q6H PRN HOLLI Lion      pantoprazole  20 mg Oral Early Morning Eileenaddie CherryHOLLI Jimenez      polyethylene glycol  17 g Oral Daily HOLLI Lion      polyethylene glycol  17 g Oral Daily PRN [facility] Holter, DO      propranolol  10 mg Oral Q12H KAYLIE HOLLI Lion      senna-docusate sodium  1 tablet Oral Daily PRN Jordan C Holter, DO      senna-docusate sodium  2 tablet Oral BID HOLLI Lion      traZODone  50 mg Oral HS PRN HOLLI Lion      white petrolatum-mineral oil   Topical TID PRN HOLLI Lion         Risks, benefits and possible side effects of Medications:   Risks, benefits, and possible side effects of medications explained to patient and patient verbalizes understanding

## 2024-07-04 NOTE — NURSING NOTE
Alberto maintained on ongoing SAFE precaution without incident on this shift.  Awake, alert , visible, pleasant and cooperative upon approach.   Attended and participated in 7 out of 9 groups today.  Continues to be compliant with medication regimen and had snack.  Alberto spend most of his time watching TV, playing cards and other games with selective peers.  Denies any depression or anxiety.  No delusion or A/T/V hallucination noted.  Behavioral control

## 2024-07-04 NOTE — NURSING NOTE
Alberto reported feeling depressed today, but denied feeling anxious.  Reported that he hears voices everyday.  They are still telling him that they are going to kill him and his family.  When asked does he think that they would really harm him, he responded yes.  Pt is visible, social, pleasant and cooperative.  Meal and med compliant, consumed 100% of dinner.  Requested gum throughout the shift.  No unmet needs.  No behavioral issues noted or reported.

## 2024-07-04 NOTE — NURSING NOTE
Patient stated Zofran taken earlier today was effective. No further complains on nausea or vomiting during the shift. Patient remained calm, alert, visible, pleasant on approach and compliant with care. He reported tiredness today. No other psych concerns. Med and meals compliant. Will continue ongoing safe precautions.

## 2024-07-04 NOTE — NURSING NOTE
Zoftran PRN was administered at 0908 for nausea and vomiting episode this am. Will continue to monitor.

## 2024-07-05 ENCOUNTER — ANESTHESIA (INPATIENT)
Dept: PREOP/PACU | Facility: HOSPITAL | Age: 29
DRG: 750 | End: 2024-07-05
Payer: COMMERCIAL

## 2024-07-05 ENCOUNTER — ANESTHESIA EVENT (INPATIENT)
Dept: PREOP/PACU | Facility: HOSPITAL | Age: 29
DRG: 750 | End: 2024-07-05
Payer: COMMERCIAL

## 2024-07-05 ENCOUNTER — APPOINTMENT (INPATIENT)
Dept: PREOP/PACU | Facility: HOSPITAL | Age: 29
DRG: 750 | End: 2024-07-05
Attending: PSYCHIATRY & NEUROLOGY
Payer: COMMERCIAL

## 2024-07-05 PROCEDURE — 90870 ELECTROCONVULSIVE THERAPY: CPT

## 2024-07-05 PROCEDURE — 90870 ELECTROCONVULSIVE THERAPY: CPT | Performed by: STUDENT IN AN ORGANIZED HEALTH CARE EDUCATION/TRAINING PROGRAM

## 2024-07-05 PROCEDURE — GZB2ZZZ ELECTROCONVULSIVE THERAPY, BILATERAL-SINGLE SEIZURE: ICD-10-PCS | Performed by: STUDENT IN AN ORGANIZED HEALTH CARE EDUCATION/TRAINING PROGRAM

## 2024-07-05 RX ORDER — LIDOCAINE HYDROCHLORIDE 20 MG/ML
15 SOLUTION OROPHARYNGEAL 4 TIMES DAILY PRN
Status: DISPENSED | OUTPATIENT
Start: 2024-07-05 | End: 2024-07-10

## 2024-07-05 RX ORDER — LABETALOL HYDROCHLORIDE 5 MG/ML
INJECTION, SOLUTION INTRAVENOUS AS NEEDED
Status: DISCONTINUED | OUTPATIENT
Start: 2024-07-05 | End: 2024-07-05

## 2024-07-05 RX ORDER — ESMOLOL HYDROCHLORIDE 10 MG/ML
INJECTION INTRAVENOUS AS NEEDED
Status: DISCONTINUED | OUTPATIENT
Start: 2024-07-05 | End: 2024-07-05

## 2024-07-05 RX ORDER — ETOMIDATE 2 MG/ML
INJECTION INTRAVENOUS AS NEEDED
Status: DISCONTINUED | OUTPATIENT
Start: 2024-07-05 | End: 2024-07-05

## 2024-07-05 RX ORDER — KETOROLAC TROMETHAMINE 30 MG/ML
INJECTION, SOLUTION INTRAMUSCULAR; INTRAVENOUS AS NEEDED
Status: DISCONTINUED | OUTPATIENT
Start: 2024-07-05 | End: 2024-07-05

## 2024-07-05 RX ORDER — SUCCINYLCHOLINE/SOD CL,ISO/PF 100 MG/5ML
SYRINGE (ML) INTRAVENOUS AS NEEDED
Status: DISCONTINUED | OUTPATIENT
Start: 2024-07-05 | End: 2024-07-05

## 2024-07-05 RX ORDER — HYDRALAZINE HYDROCHLORIDE 20 MG/ML
INJECTION INTRAMUSCULAR; INTRAVENOUS AS NEEDED
Status: DISCONTINUED | OUTPATIENT
Start: 2024-07-05 | End: 2024-07-05

## 2024-07-05 RX ORDER — SODIUM CHLORIDE, SODIUM LACTATE, POTASSIUM CHLORIDE, CALCIUM CHLORIDE 600; 310; 30; 20 MG/100ML; MG/100ML; MG/100ML; MG/100ML
INJECTION, SOLUTION INTRAVENOUS CONTINUOUS PRN
Status: DISCONTINUED | OUTPATIENT
Start: 2024-07-05 | End: 2024-07-05

## 2024-07-05 RX ORDER — GLYCOPYRROLATE 0.2 MG/ML
INJECTION INTRAMUSCULAR; INTRAVENOUS AS NEEDED
Status: DISCONTINUED | OUTPATIENT
Start: 2024-07-05 | End: 2024-07-05

## 2024-07-05 RX ADMIN — LIDOCAINE HYDROCHLORIDE 15 ML: 20 SOLUTION ORAL at 21:24

## 2024-07-05 RX ADMIN — MELATONIN TAB 3 MG 3 MG: 3 TAB at 21:25

## 2024-07-05 RX ADMIN — KETOROLAC TROMETHAMINE 30 MG: 30 INJECTION, SOLUTION INTRAMUSCULAR; INTRAVENOUS at 07:01

## 2024-07-05 RX ADMIN — GLYCOPYRROLATE 0.4 MG: 0.2 INJECTION INTRAMUSCULAR; INTRAVENOUS at 06:50

## 2024-07-05 RX ADMIN — POLYETHYLENE GLYCOL 3350 17 G: 17 POWDER, FOR SOLUTION ORAL at 08:38

## 2024-07-05 RX ADMIN — CYANOCOBALAMIN TAB 1000 MCG 1000 MCG: 1000 TAB at 08:38

## 2024-07-05 RX ADMIN — LABETALOL HYDROCHLORIDE 20 MG: 5 INJECTION, SOLUTION INTRAVENOUS at 07:05

## 2024-07-05 RX ADMIN — FLUTICASONE PROPIONATE 1 SPRAY: 50 SPRAY, METERED NASAL at 08:38

## 2024-07-05 RX ADMIN — GLYCOPYRROLATE 1 MG: 1 TABLET ORAL at 17:04

## 2024-07-05 RX ADMIN — AMLODIPINE BESYLATE 5 MG: 5 TABLET ORAL at 05:46

## 2024-07-05 RX ADMIN — SENNOSIDES AND DOCUSATE SODIUM 2 TABLET: 8.6; 5 TABLET ORAL at 08:38

## 2024-07-05 RX ADMIN — PROPRANOLOL HYDROCHLORIDE 10 MG: 10 TABLET ORAL at 21:25

## 2024-07-05 RX ADMIN — NICOTINE POLACRILEX 2 MG: 2 GUM, CHEWING ORAL at 13:03

## 2024-07-05 RX ADMIN — HYDRALAZINE HYDROCHLORIDE 20 MG: 20 INJECTION, SOLUTION INTRAMUSCULAR; INTRAVENOUS at 07:00

## 2024-07-05 RX ADMIN — HYDROCHLOROTHIAZIDE 12.5 MG: 12.5 TABLET ORAL at 08:38

## 2024-07-05 RX ADMIN — FAMOTIDINE 20 MG: 20 TABLET ORAL at 17:04

## 2024-07-05 RX ADMIN — SENNOSIDES AND DOCUSATE SODIUM 2 TABLET: 8.6; 5 TABLET ORAL at 17:04

## 2024-07-05 RX ADMIN — GLYCOPYRROLATE 1 MG: 1 TABLET ORAL at 08:38

## 2024-07-05 RX ADMIN — METFORMIN HYDROCHLORIDE 500 MG: 500 TABLET, FILM COATED ORAL at 17:04

## 2024-07-05 RX ADMIN — FAMOTIDINE 20 MG: 20 TABLET ORAL at 08:38

## 2024-07-05 RX ADMIN — CLOZAPINE 500 MG: 100 TABLET ORAL at 21:25

## 2024-07-05 RX ADMIN — ACETAMINOPHEN 650 MG: 325 TABLET, FILM COATED ORAL at 17:13

## 2024-07-05 RX ADMIN — SODIUM CHLORIDE, SODIUM LACTATE, POTASSIUM CHLORIDE, AND CALCIUM CHLORIDE: .6; .31; .03; .02 INJECTION, SOLUTION INTRAVENOUS at 06:40

## 2024-07-05 RX ADMIN — METFORMIN HYDROCHLORIDE 500 MG: 500 TABLET, FILM COATED ORAL at 08:38

## 2024-07-05 RX ADMIN — GLYCOPYRROLATE 1 MG: 1 TABLET ORAL at 21:25

## 2024-07-05 RX ADMIN — ARIPIPRAZOLE 15 MG: 15 TABLET ORAL at 08:38

## 2024-07-05 RX ADMIN — LORATADINE 10 MG: 10 TABLET ORAL at 08:37

## 2024-07-05 RX ADMIN — NICOTINE POLACRILEX 2 MG: 2 GUM, CHEWING ORAL at 17:04

## 2024-07-05 RX ADMIN — PROPRANOLOL HYDROCHLORIDE 10 MG: 10 TABLET ORAL at 05:45

## 2024-07-05 RX ADMIN — Medication 50 MG: at 07:04

## 2024-07-05 RX ADMIN — ESCITALOPRAM OXALATE 20 MG: 10 TABLET, FILM COATED ORAL at 08:38

## 2024-07-05 RX ADMIN — ETOMIDATE INJECTION 20 MG: 2 SOLUTION INTRAVENOUS at 06:56

## 2024-07-05 RX ADMIN — NICOTINE POLACRILEX 2 MG: 2 GUM, CHEWING ORAL at 21:25

## 2024-07-05 RX ADMIN — Medication 40 MG: at 06:56

## 2024-07-05 RX ADMIN — PANTOPRAZOLE SODIUM 20 MG: 20 TABLET, DELAYED RELEASE ORAL at 05:45

## 2024-07-05 NOTE — PROCEDURES
"Procedure Note - ECT  Alberto Berumen 28 y.o. male MRN: [id number]    Time out was taken with staff to confirm correct patient and correct procedure to be performed.Patient reports still having AH that say derogatory comments. Feels mild memory issues from ECT though feels the benefits from the treatment outweigh this - \"I feel less in my head\". Agrees to proceed today.    Session Number: Maintenance    Diagnosis: Principal Problem:    Schizoaffective disorder, bipolar type ([facility])  Active Problems:    GERD (gastroesophageal reflux disease)    Medical clearance for psychiatric admission    Tobacco abuse    T wave inversion in EKG    Chronic idiopathic constipation    Confluent and reticulate papillomatosis    Primary hypertension    Elevated hemoglobin A1c    Bilateral lower extremity edema      ECT Type: Inpatient, Maintenance    Anesthesia: Etomidate :    Electrode Placement: Bilateral    Energy level:  100 %      Seizure Duration     EE Sec.    EMG : 31 Sec    Post-ictal Suppression Index: 74.4 %    Results:Clinical seizure was satisfactory, Patient tolerated ECT well    Vitals:    24 0730   BP: 140/62   Pulse: 98   Resp: (!) 10   Temp:    SpO2: 100%        Medication Administration - last 24 hours from 2024 0740 to 2024 0740         Date/Time Order Dose Route Action Action by     2024 09 EDT amLODIPine (NORVASC) tablet 5 mg 0 mg Oral Hold Isabel Lama RN     2024 0546 EDT amLODIPine (NORVASC) tablet 5 mg 5 mg Oral Given Isabel Lama RN     2024 0900 EDT amLODIPine (NORVASC) tablet 5 mg 5 mg Oral Given Wanda Marte RN     2024 0901 EDT escitalopram (LEXAPRO) tablet 20 mg 20 mg Oral Given Wanda Marte RN     2024 0902 EDT polyethylene glycol (MIRALAX) packet 17 g 17 g Oral Given Wanda Marte RN     2024 09 EDT propranolol (INDERAL) tablet 10 mg 0 mg Oral Hold Isabel Lama RN     2024 0545 EDT propranolol (INDERAL) tablet 10 mg 10 mg Oral " Given Isabel Lama RN     07/04/2024 2122 EDT propranolol (INDERAL) tablet 10 mg 10 mg Oral Given Beatriz Holguin LPN     07/04/2024 0901 EDT propranolol (INDERAL) tablet 10 mg 10 mg Oral Given Wanda Marte RN     07/04/2024 1720 EDT senna-docusate sodium (SENOKOT S) 8.6-50 mg per tablet 2 tablet 2 tablet Oral Given Beatriz Holguin LPN     07/04/2024 0900 EDT senna-docusate sodium (SENOKOT S) 8.6-50 mg per tablet 2 tablet 2 tablet Oral Given Wanda Marte RN     07/04/2024 0908 EDT ondansetron (ZOFRAN-ODT) dispersible tablet 4 mg 4 mg Oral Given Wanda Marte RN     07/04/2024 1723 EDT senna-docusate sodium (SENOKOT S) 8.6-50 mg per tablet 1 tablet 1 tablet Oral Not Given Beatriz Holguin LPN     07/04/2024 2122 EDT melatonin tablet 3 mg 3 mg Oral Given Beatriz Holguin LPN     07/04/2024 0901 EDT cyanocobalamin (VITAMIN B-12) tablet 1,000 mcg 1,000 mcg Oral Given Wanda Marte RN     07/04/2024 2122 EDT cloZAPine (CLOZARIL) tablet 500 mg 500 mg Oral Given Beatriz Holguin LPN     07/04/2024 2122 EDT nicotine polacrilex (NICORETTE) gum 2 mg 2 mg Oral Given Beatriz Holguin LPN     07/04/2024 1720 EDT nicotine polacrilex (NICORETTE) gum 2 mg 2 mg Oral Given Beatriz Holguin LPN     07/04/2024 0904 EDT nicotine polacrilex (NICORETTE) gum 2 mg 2 mg Oral Given Wanda Marte RN     07/04/2024 0908 EDT fluticasone (FLONASE) 50 mcg/act nasal spray 1 spray 1 spray Each Nare Given Wanda Marte RN     07/04/2024 0901 EDT loratadine (CLARITIN) tablet 10 mg 10 mg Oral Given Wanda Marte RN     07/04/2024 0901 EDT hydroCHLOROthiazide tablet 12.5 mg 12.5 mg Oral Given Wanda Marte, MIGUEL     07/04/2024 1720 EDT metFORMIN (GLUCOPHAGE) tablet 500 mg 500 mg Oral Given Beatriz Holguin LPN     07/04/2024 0903 EDT metFORMIN (GLUCOPHAGE) tablet 500 mg 500 mg Oral Given Wanda Marte, MIGUEL     07/04/2024 2122 EDT glycopyrrolate (ROBINUL) tablet 1 mg 1 mg Oral Given Beatriz Holguin LPN     07/04/2024 1720 EDT glycopyrrolate (ROBINUL) tablet 1 mg  1 mg Oral Given Beatriz Holguin LPN     07/04/2024 0901 EDT glycopyrrolate (ROBINUL) tablet 1 mg 1 mg Oral Given Wanda Marte RN     07/04/2024 0900 EDT ARIPiprazole (ABILIFY) tablet 15 mg 15 mg Oral Given Wanda Marte RN     07/05/2024 0545 EDT pantoprazole (PROTONIX) EC tablet 20 mg 20 mg Oral Given Isabel Lama RN     07/04/2024 0947 EDT pantoprazole (PROTONIX) EC tablet 20 mg 20 mg Oral Given Penny Hummel RN     07/04/2024 1720 EDT famotidine (PEPCID) tablet 20 mg 20 mg Oral Given Beatriz Holguin LPN     07/04/2024 0947 EDT famotidine (PEPCID) tablet 20 mg 20 mg Oral Given Penny Hummel RN     07/05/2024 0735 EDT lactated ringers infusion 0 mL/kg/hr Intravenous Stopped Cynthia Montenegro RN     07/05/2024 0714 EDT lactated ringers infusion -- Intravenous Anesthesia Volume Adjustment Anjel Arriaza CRNA     07/05/2024 0640 EDT lactated ringers infusion -- Intravenous New Bag Anjel Arriaza CRNA

## 2024-07-05 NOTE — PROGRESS NOTES
07/05/24 3969   Team Meeting   Meeting Type Daily Rounds   Team Members Present   Team Members Present Physician;Nurse;;Other (Discipline and Name)   Patient/Family Present   Patient Present No   Patient's Family Present No     In attendance:  MD Eveline Gamino, HOLLI Taylor, MIGUEL Alvarado, [facility]  Carla Lux, [facility]  Irais Mcdowell, M.S.    Groups: 1/4    Pt had ECT this morning and labs yesterday; some levels not WNL. Pt reports depression ongoing and consistent voices.

## 2024-07-05 NOTE — SOCIAL WORK
SW attempted to check in with pt x2. Pt observed sleeping soundly both times. Pt is typically more tired after ECT. SW will follow up next week.

## 2024-07-05 NOTE — PLAN OF CARE
Problem: Ineffective Coping  Goal: Identifies ineffective coping skills  Outcome: Progressing  Goal: Identifies healthy coping skills  Outcome: Progressing  Goal: Demonstrates healthy coping skills  Outcome: Progressing  Goal: Participates in unit activities  Description: Interventions:  - Provide therapeutic environment   - Provide required programming   - Redirect inappropriate behaviors   Outcome: Progressing   Attended 8/13 groups offered during the past 2 days.

## 2024-07-05 NOTE — NURSING NOTE
"Pt is isolative to self and room. He refused breakfast and consumed 25% of lunch. Took his medications without incidence. He had ECT today; VSS and swallow evaluation WDL. Pt had reddish specs on his lips, he stated, \"I think I bit my tongue sleeping.\" Denied needs. He forgot that he took his morning medications. Reported feeling tired. No behavioral issues.   "

## 2024-07-05 NOTE — PLAN OF CARE
Problem: Alteration in Thoughts and Perception  Goal: Verbalize thoughts and feelings  Description: Interventions:  - Promote a nonjudgmental and trusting relationship with the patient through active listening and therapeutic communication  - Assess patient's level of functioning, behavior and potential for risk  - Engage patient in 1 on 1 interactions  - Encourage patient to express fears, feelings, frustrations, and discuss symptoms    - [place] patient to reality, help patient recognize reality-based thinking   - Administer medications as ordered and assess for potential side effects  - Provide the patient education related to the signs and symptoms of the illness and desired effects of prescribed medications  Outcome: Not Progressing  Goal: Refrain from acting on delusional thinking/internal stimuli  Description: Interventions:  - Monitor patient closely, per order   - Utilize least restrictive measures   - Set reasonable limits, give positive feedback for acceptable   - Administer medications as ordered and monitor of potential side effects  Outcome: Progressing  Goal: Agree to be compliant with medication regime, as prescribed and report medication side effects  Description: Interventions:  - Offer appropriate PRN medication and supervise ingestion; conduct AIMS, as needed   Outcome: Not Progressing  Goal: Attend and participate in unit activities, including therapeutic, recreational, and educational groups  Description: Interventions:  -Encourage Visitation and family involvement in care  Outcome: Not Progressing  Goal: Recognize dysfunctional thoughts, communicate reality-based thoughts at the time of discharge  Description: Interventions:  - Provide medication and psycho-education to assist patient in compliance and developing insight into his/her illness   Outcome: Not Progressing  Goal: Complete daily ADLs, including personal hygiene independently, as able  Description: Interventions:  - Observe, teach,  and assist patient with ADLS  - Monitor and promote a balance of rest/activity, with adequate nutrition and elimination   Outcome: Not Progressing     Problem: Ineffective Coping  Goal: Identifies ineffective coping skills  Outcome: Not Progressing  Goal: Identifies healthy coping skills  Outcome: Not Progressing  Goal: Demonstrates healthy coping skills  Outcome: Not Progressing  Goal: Participates in unit activities  Description: Interventions:  - Provide therapeutic environment   - Provide required programming   - Redirect inappropriate behaviors   Outcome: Not Progressing  Goal: Patient/Family participate in treatment and DC plans  Description: Interventions:  - Provide therapeutic environment  Outcome: Not Progressing  Goal: Patient/Family verbalizes awareness of resources  Outcome: Not Progressing     Problem: Depression  Goal: Verbalize thoughts and feelings  Description: Interventions:  - Assess and re-assess patient's level of risk   - Engage patient in 1:1 interactions, daily, for a minimum of 15 minutes   - Encourage patient to express feelings, fears, frustrations, hopes   Outcome: Not Progressing  Goal: Refrain from harming self  Description: Interventions:  - Monitor patient closely, per order   - Supervise medication ingestion, monitor effects and side effects   Outcome: Progressing  Goal: Refrain from isolation  Description: Interventions:  - Develop a trusting relationship   - Encourage socialization   Outcome: Not Progressing  Goal: Attend and participate in unit activities, including therapeutic, recreational, and educational groups  Description: Interventions:  - Provide therapeutic and educational activities daily, encourage attendance and participation, and document same in the medical record   Outcome: Not Progressing  Goal: Complete daily ADLs, including personal hygiene independently, as able  Description: Interventions:  - Observe, teach, and assist patient with ADLS  -  Monitor and promote  a balance of rest/activity, with adequate nutrition and elimination   Outcome: Not Progressing     Problem: Anxiety  Goal: Anxiety is at manageable level  Description: Interventions:  - Assess and monitor patient's anxiety level.   - Monitor for signs and symptoms (heart palpitations, chest pain, shortness of breath, headaches, nausea, feeling jumpy, restlessness, irritable, apprehensive).   - Collaborate with interdisciplinary team and initiate plan and interventions as ordered.  - [place] patient to unit/surroundings  - Explain treatment plan  - Encourage participation in care  - Encourage verbalization of concerns/fears  - Identify coping mechanisms  - Assist in developing anxiety-reducing skills  - Administer/offer alternative therapies  - Limit or eliminate stimulants  Outcome: Not Progressing     Problem: Alteration in Orientation  Goal: Interact with staff daily  Description: Interventions:  - Assess and re-assess patient's level of orientation  - Engage patient in 1 on 1 interactions, daily, for a minimum of 15 minutes   - Establish rapport/trust with patient   Outcome: Not Progressing  Goal: Express concerns related to confused thinking related to:  Description: Interventions:  - Encourage patient to express feelings, fears, frustrations, hopes  - Assign consistent caregivers   - [place]/re-orient patient as needed  - Allow comfort items, as appropriate  - Provide visual cues, signs, etc.   Outcome: Not Progressing  Goal: Allow medical examinations, as recommended  Description: Interventions:  - Provide physical/neurological exams and/or referrals, per provider   Outcome: Not Progressing  Goal: Cooperate with recommended testing/procedures  Description: Interventions:  - Determine need for ancillary testing  - Observe for mental status changes  - Implement falls/precaution protocol   Outcome: Not Progressing  Goal: Attend and participate in unit activities, including therapeutic, recreational, and educational  groups  Description: Interventions:  - Provide therapeutic and educational activities daily, encourage attendance and participation, and document same in the medical record   - Provide appropriate opportunities for reminiscence   - Provide a consistent daily routine   - Encourage family contact/visitation   Outcome: Not Progressing  Goal: Complete daily ADLs, including personal hygiene independently, as able  Description: Interventions:  - Observe, teach, and assist patient with ADLS  Outcome: Not Progressing     Problem: Electroconvulsive therapy (ECT)  Goal: Verbalizes/displays adequate comfort level or baseline comfort level  Description: Interventions:  - Encourage patient to monitor pain and request assistance  - Assess pain using appropriate pain scale  - Administer analgesics based on type and severity of pain and evaluate response  - Implement non-pharmacological measures as appropriate and evaluate response  - Consider cultural and social influences on pain and pain management  - Notify physician/advanced practitioner if interventions unsuccessful or patient reports new pain  Outcome: Not Progressing  Goal: Achieves stable or improved neurological status  Description: INTERVENTIONS  - Monitor and report changes in neurological status  - Monitor vital signs such as temperature, blood pressure, glucose, and any other labs ordered   - Initiate measures to prevent increased intracranial pressure  - Monitor for seizure activity and implement precautions if appropriate      Outcome: Not Progressing  Goal: Achieves maximal functionality and self care  Description: INTERVENTIONS  - Monitor swallowing and airway patency with patient fatigue and changes in neurological status  - Encourage and assist patient to increase activity and self care.   - Encourage visually impaired, hearing impaired and aphasic patients to use assistive/communication devices  Outcome: Not Progressing  Goal: Maintain or return mobility to  safest level of function  Description: INTERVENTIONS:  - Assess patient's ability to carry out ADLs; assess patient's baseline for ADL function and identify physical deficits which impact ability to perform ADLs (bathing, care of mouth/teeth, toileting, grooming, dressing, etc.)  - Assess/evaluate cause of self-care deficits   - Assess range of motion  - Assess patient's mobility  - Assess patient's need for assistive devices and provide as appropriate  - Encourage maximum independence but intervene and supervise when necessary  - Involve family in performance of ADLs  - Assess for home care needs following discharge   - Consider OT consult to assist with ADL evaluation and planning for discharge  - Provide patient education as appropriate  Outcome: Not Progressing  Goal: Absence of urinary retention  Description: INTERVENTIONS:  - Assess patient’s ability to void and empty bladder  - Monitor I/O  - Bladder scan as needed  - Discuss with physician/AP medications to alleviate retention as needed  - Discuss catheterization for long term situations as appropriate  Outcome: Not Progressing  Goal: Minimal or absence of nausea and/or vomiting  Description: INTERVENTIONS:  - Administer IV fluids if ordered to ensure adequate hydration  - Maintain NPO status until nausea and vomiting are resolved  - Nasogastric tube if ordered  - Administer ordered antiemetic medications as needed  - Provide nonpharmacologic comfort measures as appropriate  - Advance diet as tolerated, if ordered  - Consider nutrition services referral to assist patient with adequate nutrition and appropriate food choices  Outcome: Progressing  Goal: Maintains adequate nutritional intake  Description: INTERVENTIONS:  - Monitor percentage of each meal consumed  - Identify factors contributing to decreased intake, treat as appropriate  - Assist with meals as needed  - Monitor I&O, weight, and lab values if indicated  - Obtain nutrition services referral as  needed  Outcome: Progressing     Problem: DISCHARGE PLANNING - CARE MANAGEMENT  Goal: Discharge to post-acute care or home with appropriate resources  Description: INTERVENTIONS:  - Conduct assessment to determine patient/family and health care team treatment goals, and need for post-acute services based on payer coverage, community resources, and patient preferences, and barriers to discharge  - Address psychosocial, clinical, and financial barriers to discharge as identified in assessment in conjunction with the patient/family and health care team  - Arrange appropriate level of post-acute services according to patient’s   needs and preference and payer coverage in collaboration with the physician and health care team  - Communicate with and update the patient/family, physician, and health care team regarding progress on the discharge plan  - Arrange appropriate transportation to post-acute venues  Outcome: Not Progressing

## 2024-07-05 NOTE — ANESTHESIA PREPROCEDURE EVALUATION
Procedure:  ELECTROCONVULSIVE THERAPY (ECT)    Relevant Problems   CARDIO   (+) Primary hypertension      GI/HEPATIC   (+) GERD (gastroesophageal reflux disease)        Physical Exam    Airway    Mallampati score: III  TM Distance: >3 FB  Neck ROM: full     Dental   No notable dental hx     Cardiovascular  Cardiovascular exam normal    Pulmonary  Pulmonary exam normal     Other Findings        Anesthesia Plan  ASA Score- 3     Anesthesia Type- general with ASA Monitors.         Additional Monitors:     Airway Plan:            Plan Factors-Exercise tolerance (METS): >4 METS.    Chart reviewed. EKG reviewed.  Existing labs reviewed. Patient summary reviewed.    Patient is not a current smoker.              Induction- intravenous.    Postoperative Plan-         Informed Consent- Anesthetic plan and risks discussed with patient.  I personally reviewed this patient with the CRNA. Discussed and agreed on the Anesthesia Plan with the CRNA..
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
## 2024-07-05 NOTE — PROGRESS NOTES
Psychiatry Progress Note [facility]    Alberto Berumen 28 y.o. male MRN: [id number]  Unit/Bed#: [facility] 108-02 Encounter: [id number]  Code Status: Level 1 - Full Code    PCP: Joce Juan MD    Date of Admission:  3/29/2024 2008   Date of Service:  07/05/24    Patient Active Problem List   Diagnosis    GERD (gastroesophageal reflux disease)    Medical clearance for psychiatric admission    Schizoaffective disorder, bipolar type (HCC)    Tobacco abuse    T wave inversion in EKG    Syringoma    Chronic idiopathic constipation    Vitamin B 12 deficiency    Vitamin D deficiency    Confluent and reticulate papillomatosis    Class 2 obesity in adult    Primary hypertension    Elevated hemoglobin A1c    Bilateral lower extremity edema       Review of systems: Unremarkable  Psychiatric Diagnosis: Schizoaffective bipolar    Assessment  Overall Status: Threatening voices are constant and more frequent today  Certification Statement: The patient will continue to require additional inpatient hospital stay due to ongoing voices that threaten to kill him and his family, and lack of response to medications and ECT thus far        Medications:   Clozapine 500mg at bedtime, Propranolol 10mg q12H as needed for anxiety, Abilify 15mg at bedtime, Lexapro 20mg qd, Atropine eyedrops sublingual for drooling of saliva, Senna 2 tablets bid for constipation.   All medications reviewed and recommended they be continued for symptoms management.    Side effects from treatment: None reported  Medication changes   None today   Medication education   Risks side effects benefits and precautions of medications discussed with patient and he did verbalize an understanding about risks for metabolic syndrome from being on neuroleptics and is form tardive dyskinesia etc.  All medications reviewed and I recommend that they be continued for symptom management   Understanding of medications: Pt somnolent, would not answer    Justification for dual anti-psychotics: Not applicable    Non-pharmacological treatments  Continue with individual, group, milieu and occupational therapy using recovery principles and psycho-education about accepting illness and the need for treatment.   to contact aunt, explore ACT team referral which he's never had  ECT to be continued q2 weekly for maintenance x6  Refuses to see a dietician, encourage exercise -- pt is about 100 lbs overweight  Prolactin level previously high, Risperdal replaced with Abilify    Safety  Safety and communication plan established to target dynamic risk factors discussed above.    Discharge Plan   Home w/ aunt with ACT support    Interval Progress     Patient received ECT maintenance treatment this morning. He is sleeping and lightly snoring in bed, covered head/face to toe with a blanket. He is arouse able but answers questions with a nod or single syllable.     Per nursing note, pt took his medications this morning but refused breakfast. Reports feeling tired and that he may have bit his tongue while sleeping. Pt is otherwise sleepy, cooperative, and without any behavioral issues.    Acceptance by patient: Pt refused to answer, likely accepting  Hopefulness in recovery: Pt refused to answer, likely accepting  Involved in reintegration process: Aunt and sister  Trusting in relationship with psychiatrist: Pt refused to answer, likely trusting  Sleep: Good  Appetite: Did not eat this morning  Compliance with Medications: Yes  Group attendance: 1/4 groups yesterday  Significant events: ECT treatment this morning      Mental Status Exam  Appearance: age appropriate, overweight, dreadlocked hair, pt in bed covered face to toes by blanket  Behavior: calm, slow responses, does not want to talk, somnolent and snoring, intermittent eye contact, no psychomotor agitation noted  Speech: normal rate, decreased volume, paucity of speech, spoke in one-syllable replies, with head  nodding  Mood: depressed  Affect:  sleepy, tired, limited range, difficult to assess congruency to mood and thought content  Thought Process: slowing of thoughts, minimal thought content verbalized  Thought Content:  paranoia and intrusive thoughts, due to somnolence unable to assess presence of other delusions, denies SI and HI  Perceptual Disturbances:  chronic auditory hallucinations, same as previously - voices saying they will kill and his family, constant, bur pt reports increasing frequency, due to somnolence unable to assess presence of hallucinations in other sensory modalities  Hx Risk Factors: chronic psychiatric problems, history of mood disorder, chronic psychotic symptoms  Sensorium: somnolent yet arouse able  Cognition: patient does not answer  Consciousness:  somnolent  Attention:  limited attention and concentration due to somnolence, but did remember writer and was able to answer a few questions  Intellect: appears to be of average intelligence  Insight: fair  Judgement: fair  Motor Activity: no abnormal movements       Vitals  Temp:  [96.5 °F (35.8 °C)-97.5 °F (36.4 °C)] 97.5 °F (36.4 °C)  HR:  [] 95  Resp:  [10-20] 15  BP: (123-167)/(57-97) 123/57  SpO2:  [94 %-100 %] 98 %    Intake/Output Summary (Last 24 hours) at 7/5/2024 0822  Last data filed at 7/5/2024 0714  Gross per 24 hour   Intake 300 ml   Output --   Net 300 ml       Lab Results: All Labs For Current Hospital Admission Reviewed    Results from last 7 days   Lab Units 07/04/24 2003 06/28/24 2000   WBC Thousand/uL 9.74 10.55*   RBC Million/uL 5.53 5.45   HEMOGLOBIN g/dL 13.2 12.9   HEMATOCRIT % 42.3 43.5   MCV fL 77* 80*   PLATELETS Thousands/uL 276 294   TOTAL NEUT ABS Thousands/µL 5.20 5.91   CLOZAPINE LVL ng/mL  --  420       Current Facility-Administered Medications   Medication Dose Route Frequency Provider Last Rate    acetaminophen  650 mg Oral Q4H PRN Jordan C Holter, DO      acetaminophen  650 mg Oral Q6H PRN Eveline  HOLLI Hunt      aluminum-magnesium hydroxide-simethicone  30 mL Oral Q4H PRN Jordan C Holter, DO      amLODIPine  5 mg Oral Daily HOLLI Lion      ARIPiprazole  15 mg Oral Daily Bora Rosario MD      Artificial Tears  1 drop Both Eyes Q3H PRN Jordan C Holter, DO      atropine  1 drop Sublingual Daily PRN HOLLI Lion      haloperidol lactate  2.5 mg Intramuscular Q4H PRN Max 4/day STEVE LionNP      And    LORazepam  1 mg Intramuscular Q4H PRN Max 4/day STEVE LionNP      And    benztropine  0.5 mg Intramuscular Q4H PRN Max 4/day STEVE LionNP      benztropine  1 mg Intramuscular Q4H PRN Max 6/day Jordan C Holter, DO      haloperidol lactate  5 mg Intramuscular Q4H PRN Max 4/day HOLLI Lion      And    LORazepam  2 mg Intramuscular Q4H PRN Max 4/day HOLLI Lion      And    benztropine  1 mg Intramuscular Q4H PRN Max 4/day HOLLI Lion      benztropine  1 mg Oral Q4H PRN Max 6/day HOLLI Lion      benztropine  1 mg Oral Q4H PRN Max 6/day Jordan C Holter, DO      bisacodyl  10 mg Rectal Daily PRN HOLLI Lion      calcium carbonate  500 mg Oral BID PRN HOLLI Monge      cloZAPine  500 mg Oral HS Bora Rosario MD      cyanocobalamin  1,000 mcg Oral Daily HOLLI Galvan      hydrOXYzine HCL  50 mg Oral Q6H PRN Max 4/day Jordan C Holter, DO      Or    diphenhydrAMINE  50 mg Intramuscular Q6H PRN Jordan C Holter, DO      hydrOXYzine HCL  50 mg Oral Q6H PRN Max 4/day HOLLI Lion      Or    diphenhydrAMINE  50 mg Intramuscular Q6H PRN HOLLI Lion      diphenhydrAMINE-zinc acetate   Topical BID PRN HOLLI Lion      escitalopram  20 mg Oral Daily HOLLI Lion      famotidine  20 mg Oral BID HOLLI Monge      fluticasone  1 spray Each Nare Daily Brina Guillen MD      glycopyrrolate  1 mg Oral TID Bora Rosario MD      haloperidol  1 mg Oral Q6H PRN HOLLI Lion      haloperidol  2.5 mg Oral  Q4H PRN Max 4/day HOLLI Lion      haloperidol  5 mg Oral Q4H PRN Max 4/day HOLLI Loin      hydroCHLOROthiazide  12.5 mg Oral Daily HOLLI Galvan      hydrocortisone   Topical 4x Daily PRN HOLLI Lion      hydrOXYzine HCL  100 mg Oral Q6H PRN Max 4/day [facility] Holter, DO      Or    LORazepam  2 mg Intramuscular Q6H PRN Ion  Holter, DO      hydrOXYzine HCL  100 mg Oral Q6H PRN Max 4/day HOLLI Lion      Or    LORazepam  2 mg Intramuscular Q6H PRN HOLLI Lion      hydrOXYzine HCL  25 mg Oral Q6H PRN Max 4/day [facility] Holter, DO      ibuprofen  600 mg Oral Q8H PRN HOLLI Lion      lactated ringers  75 mL/hr Intravenous Continuous Anjel Arriaza CRNA Stopped (06/21/24 0729)    lactated ringers  50 mL/hr Intravenous Continuous Ramya Arana MD Stopped (06/21/24 0729)    lactated ringers  50 mL/hr Intravenous Continuous Ramya Arana MD Stopped (06/21/24 0729)    loratadine  10 mg Oral Daily Brina Guillen MD      melatonin  3 mg Oral HS [facility] Holter, DO      metFORMIN  500 mg Oral BID With Meals HOLLI Galvan      methocarbamol  500 mg Oral Q6H PRN HOLLI Lion      nicotine polacrilex  2 mg Oral Q4H PRN Bora Rosario MD      OLANZapine  5 mg Oral Q4H PRN Max 3/day [facility] Holter, DO      Or    OLANZapine  2.5 mg Intramuscular Q4H PRN Max 3/day [facility] Holter, DO      OLANZapine  5 mg Oral Q3H PRN Max 3/day [facility] Holter, DO      Or    OLANZapine  5 mg Intramuscular Q3H PRN Max 3/day [facility] Holter, DO      OLANZapine  2.5 mg Oral Q4H PRN Max 6/day [facility] Holter, DO      ondansetron  4 mg Oral Q6H PRN HOLLI Lion      pantoprazole  20 mg Oral Early Morning HOLLI Monge      polyethylene glycol  17 g Oral Daily Eveline Hangey, CRNP      polyethylene glycol  17 g Oral Daily PRN Jordan C Holter, DO      propranolol  10 mg Oral Q12H [facility] HOLLI Lion      senna-docusate sodium  1 tablet Oral  Daily PRN Jordan C Holter, DO senna-docusate sodium  2 tablet Oral BID HOLLI Lion      traZODone  50 mg Oral HS PRN HOLLI Lion      white petrolatum-mineral oil   Topical TID PRN HOLLI Lion         Counseling / Coordination of Care: Total floor / unit time spent today 15 minutes. Greater than 50% of total time was spent with the patient and / or family counseling and / or somewhat receptive to supportive listening and teaching positive coping skills to deal with symptom mangement.     Patient's Rights, confidentiality and exceptions to confidentiality, use of automated medical record, [facility] staff access to medical record, and consent to treatment reviewed.    This note has been dictated and hence there may be problems with punctuation, spelling and formatting and if anyone has any concerns please address them to Dr. Rosario   This note is not shared with patient due to potential for making patient's condition worse by knowing the content of the note.

## 2024-07-05 NOTE — NURSING NOTE
Pt requested PRN Tylenol for tongue pain from biting his tongue in ECT. Pt reports having a difficult time eating due to pain. Pt received PRN Tylenol 650 mg at 1713 for 6/10 pain. Will monitor for effectiveness.

## 2024-07-06 PROCEDURE — 99232 SBSQ HOSP IP/OBS MODERATE 35: CPT | Performed by: NURSE PRACTITIONER

## 2024-07-06 RX ADMIN — PROPRANOLOL HYDROCHLORIDE 10 MG: 10 TABLET ORAL at 08:21

## 2024-07-06 RX ADMIN — CLOZAPINE 500 MG: 100 TABLET ORAL at 21:13

## 2024-07-06 RX ADMIN — FAMOTIDINE 20 MG: 20 TABLET ORAL at 08:19

## 2024-07-06 RX ADMIN — GLYCOPYRROLATE 1 MG: 1 TABLET ORAL at 17:07

## 2024-07-06 RX ADMIN — NICOTINE POLACRILEX 2 MG: 2 GUM, CHEWING ORAL at 21:24

## 2024-07-06 RX ADMIN — NICOTINE POLACRILEX 2 MG: 2 GUM, CHEWING ORAL at 17:08

## 2024-07-06 RX ADMIN — ESCITALOPRAM OXALATE 20 MG: 10 TABLET, FILM COATED ORAL at 08:21

## 2024-07-06 RX ADMIN — GLYCOPYRROLATE 1 MG: 1 TABLET ORAL at 08:20

## 2024-07-06 RX ADMIN — CYANOCOBALAMIN TAB 1000 MCG 1000 MCG: 1000 TAB at 08:20

## 2024-07-06 RX ADMIN — FAMOTIDINE 20 MG: 20 TABLET ORAL at 17:08

## 2024-07-06 RX ADMIN — NICOTINE POLACRILEX 2 MG: 2 GUM, CHEWING ORAL at 08:22

## 2024-07-06 RX ADMIN — LIDOCAINE HYDROCHLORIDE 15 ML: 20 SOLUTION ORAL at 17:46

## 2024-07-06 RX ADMIN — FLUTICASONE PROPIONATE 1 SPRAY: 50 SPRAY, METERED NASAL at 08:23

## 2024-07-06 RX ADMIN — LIDOCAINE HYDROCHLORIDE 15 ML: 20 SOLUTION ORAL at 10:22

## 2024-07-06 RX ADMIN — METFORMIN HYDROCHLORIDE 500 MG: 500 TABLET, FILM COATED ORAL at 08:18

## 2024-07-06 RX ADMIN — SENNOSIDES AND DOCUSATE SODIUM 2 TABLET: 8.6; 5 TABLET ORAL at 08:19

## 2024-07-06 RX ADMIN — SENNOSIDES AND DOCUSATE SODIUM 2 TABLET: 8.6; 5 TABLET ORAL at 17:08

## 2024-07-06 RX ADMIN — METFORMIN HYDROCHLORIDE 500 MG: 500 TABLET, FILM COATED ORAL at 17:07

## 2024-07-06 RX ADMIN — PANTOPRAZOLE SODIUM 20 MG: 20 TABLET, DELAYED RELEASE ORAL at 06:33

## 2024-07-06 RX ADMIN — POLYETHYLENE GLYCOL 3350 17 G: 17 POWDER, FOR SOLUTION ORAL at 08:18

## 2024-07-06 RX ADMIN — LORATADINE 10 MG: 10 TABLET ORAL at 08:20

## 2024-07-06 RX ADMIN — GLYCOPYRROLATE 1 MG: 1 TABLET ORAL at 21:13

## 2024-07-06 RX ADMIN — PROPRANOLOL HYDROCHLORIDE 10 MG: 10 TABLET ORAL at 21:13

## 2024-07-06 RX ADMIN — MELATONIN TAB 3 MG 3 MG: 3 TAB at 21:13

## 2024-07-06 RX ADMIN — HYDROCHLOROTHIAZIDE 12.5 MG: 12.5 TABLET ORAL at 08:20

## 2024-07-06 RX ADMIN — ARIPIPRAZOLE 15 MG: 15 TABLET ORAL at 08:22

## 2024-07-06 NOTE — NURSING NOTE
Alert, cooperative and visible intermittently. No SI or HI noted. Denies depression, and anxiety.Pt having c/o of pain in tongue, PRN lidocaine administered PO @ 1022. Pt having c/o of auditory hallucinations stating they want to harm him and his family. Attended art therapy, coping skills, and nursing education. Consumed 100% of breakfast and 100% of lunch. Took all medication without prompting. Maintained on safe precautions without incident. Will continue to monitor progress and recovery program.

## 2024-07-06 NOTE — NURSING NOTE
Pt reports PRN Tylenol 650 mg was not effective. He continues to have pain but states he can tolerate it at this time. Pt has a new order for Xylocaine mucosal solution that he would like to receive before snack time to help with pain when trying to eat.

## 2024-07-06 NOTE — PROGRESS NOTES
Progress Note - Behavioral Health   Alberto Breumen 28 y.o. male MRN: [id number]  Unit/Bed#: [facility] 108-02 Encounter: [id number]    Assessment & Plan   Principal Problem:    Schizoaffective disorder, bipolar type (HCC)  Active Problems:    GERD (gastroesophageal reflux disease)    Medical clearance for psychiatric admission    Tobacco abuse    T wave inversion in EKG    Chronic idiopathic constipation    Confluent and reticulate papillomatosis    Primary hypertension    Elevated hemoglobin A1c    Bilateral lower extremity edema    Patient was seen for continuing care and treatment.  Case was discussed with the nursing staff.  Very polite and cooperative on approach.  Attends most groups.  Cooperative with staff.  Still continues with auditory hallucinations.  Still complaining of some tongue pain and as a result, does not eat very well.  Healing slowly.  This occurred during ECT.  Continue to monitor care here in the hospital.  Discharge planning and disposition is ongoing.  No behavior issues noted.  Behavior over the last 24 hours has been unchanged.  Patient is polite on the unit.  He is cooperative.  Tongue healing slowly but nicely after biting it during ECT treatment.  Continues with auditory hallucinations.  Continues to require inpatient care and treatment.  Has been sleeping well.  No complaints of medication side effects and no new clinical concerns.          Mental Status Evaluation:  Appearance:  age appropriate, casually dressed, and disheveled   Behavior:  Polite and cooperative   Speech:  normal pitch and normal volume   Mood:  anxious and depressed   Affect:  flat   Thought Process:  disorganized and illogical   Associations: loose associations   Thought Content:  delusions  persecutory   Perceptual Disturbances: Auditory hallucinations without commands   Risk Potential: Suicidal Ideations none  Homicidal Ideations none  Potential for Aggression No   Sensorium:  person, place, and time/date   Memory:   recent and remote memory grossly intact   Consciousness:  alert and awake    Attention: attention span appeared shorter than expected for age   Insight:  limited   Judgment: limited   Gait/Station: normal gait/station   Motor Activity: no abnormal movements     Progress Toward Goals: Remains depressed.  Still receiving ECT.  Still experiencing delusional thoughts and auditory hallucinations that threatened to kill him and his family.    Recommended Treatment: Continue with group therapy, milieu therapy and occupational therapy.      Risks, benefits and possible side effects of Medications:   Patient does not verbalize understanding at this time and will require further explanation.      Medications: current meds:   Current Facility-Administered Medications   Medication Dose Route Frequency    acetaminophen (TYLENOL) tablet 650 mg  650 mg Oral Q4H PRN    acetaminophen (TYLENOL) tablet 650 mg  650 mg Oral Q6H PRN    aluminum-magnesium hydroxide-simethicone (MAALOX) oral suspension 30 mL  30 mL Oral Q4H PRN    amLODIPine (NORVASC) tablet 5 mg  5 mg Oral Daily    ARIPiprazole (ABILIFY) tablet 15 mg  15 mg Oral Daily    Artificial Tears ophthalmic solution 1 drop  1 drop Both Eyes Q3H PRN    atropine (ISOPTO ATROPINE) 1 % ophthalmic solution 1 drop  1 drop Sublingual Daily PRN    haloperidol lactate (HALDOL) injection 2.5 mg  2.5 mg Intramuscular Q4H PRN Max 4/day    And    LORazepam (ATIVAN) injection 1 mg  1 mg Intramuscular Q4H PRN Max 4/day    And    benztropine (COGENTIN) injection 0.5 mg  0.5 mg Intramuscular Q4H PRN Max 4/day    benztropine (COGENTIN) injection 1 mg  1 mg Intramuscular Q4H PRN Max 6/day    haloperidol lactate (HALDOL) injection 5 mg  5 mg Intramuscular Q4H PRN Max 4/day    And    LORazepam (ATIVAN) injection 2 mg  2 mg Intramuscular Q4H PRN Max 4/day    And    benztropine (COGENTIN) injection 1 mg  1 mg Intramuscular Q4H PRN Max 4/day    benztropine (COGENTIN) tablet 1 mg  1 mg Oral Q4H PRN Max  6/day    benztropine (COGENTIN) tablet 1 mg  1 mg Oral Q4H PRN Max 6/day    bisacodyl (DULCOLAX) rectal suppository 10 mg  10 mg Rectal Daily PRN    calcium carbonate (TUMS) chewable tablet 500 mg  500 mg Oral BID PRN    cloZAPine (CLOZARIL) tablet 500 mg  500 mg Oral HS    cyanocobalamin (VITAMIN B-12) tablet 1,000 mcg  1,000 mcg Oral Daily    hydrOXYzine HCL (ATARAX) tablet 50 mg  50 mg Oral Q6H PRN Max 4/day    Or    diphenhydrAMINE (BENADRYL) injection 50 mg  50 mg Intramuscular Q6H PRN    hydrOXYzine HCL (ATARAX) tablet 50 mg  50 mg Oral Q6H PRN Max 4/day    Or    diphenhydrAMINE (BENADRYL) injection 50 mg  50 mg Intramuscular Q6H PRN    diphenhydrAMINE-zinc acetate (BENADRYL) 2-0.1 % cream   Topical BID PRN    escitalopram (LEXAPRO) tablet 20 mg  20 mg Oral Daily    famotidine (PEPCID) tablet 20 mg  20 mg Oral BID    fluticasone (FLONASE) 50 mcg/act nasal spray 1 spray  1 spray Each Nare Daily    glycopyrrolate (ROBINUL) tablet 1 mg  1 mg Oral TID    haloperidol (HALDOL) tablet 1 mg  1 mg Oral Q6H PRN    haloperidol (HALDOL) tablet 2.5 mg  2.5 mg Oral Q4H PRN Max 4/day    haloperidol (HALDOL) tablet 5 mg  5 mg Oral Q4H PRN Max 4/day    hydroCHLOROthiazide tablet 12.5 mg  12.5 mg Oral Daily    hydrocortisone 1 % ointment   Topical 4x Daily PRN    hydrOXYzine HCL (ATARAX) tablet 100 mg  100 mg Oral Q6H PRN Max 4/day    Or    LORazepam (ATIVAN) injection 2 mg  2 mg Intramuscular Q6H PRN    hydrOXYzine HCL (ATARAX) tablet 100 mg  100 mg Oral Q6H PRN Max 4/day    Or    LORazepam (ATIVAN) injection 2 mg  2 mg Intramuscular Q6H PRN    hydrOXYzine HCL (ATARAX) tablet 25 mg  25 mg Oral Q6H PRN Max 4/day    ibuprofen (MOTRIN) tablet 600 mg  600 mg Oral Q8H PRN    lactated ringers infusion  75 mL/hr Intravenous Continuous    lactated ringers infusion  50 mL/hr Intravenous Continuous    lactated ringers infusion  50 mL/hr Intravenous Continuous    Lidocaine Viscous HCl (XYLOCAINE) 2 % mucosal solution 15 mL  15 mL Swish &  Spit 4x Daily PRN    loratadine (CLARITIN) tablet 10 mg  10 mg Oral Daily    melatonin tablet 3 mg  3 mg Oral HS    metFORMIN (GLUCOPHAGE) tablet 500 mg  500 mg Oral BID With Meals    methocarbamol (ROBAXIN) tablet 500 mg  500 mg Oral Q6H PRN    nicotine polacrilex (NICORETTE) gum 2 mg  2 mg Oral Q4H PRN    OLANZapine (ZyPREXA) tablet 5 mg  5 mg Oral Q4H PRN Max 3/day    Or    OLANZapine (ZyPREXA) IM injection 2.5 mg  2.5 mg Intramuscular Q4H PRN Max 3/day    OLANZapine (ZyPREXA) tablet 5 mg  5 mg Oral Q3H PRN Max 3/day    Or    OLANZapine (ZyPREXA) IM injection 5 mg  5 mg Intramuscular Q3H PRN Max 3/day    OLANZapine (ZyPREXA) tablet 2.5 mg  2.5 mg Oral Q4H PRN Max 6/day    ondansetron (ZOFRAN-ODT) dispersible tablet 4 mg  4 mg Oral Q6H PRN    pantoprazole (PROTONIX) EC tablet 20 mg  20 mg Oral Early Morning    polyethylene glycol (MIRALAX) packet 17 g  17 g Oral Daily    polyethylene glycol (MIRALAX) packet 17 g  17 g Oral Daily PRN    propranolol (INDERAL) tablet 10 mg  10 mg Oral Q12H KAYLIE    senna-docusate sodium (SENOKOT S) 8.6-50 mg per tablet 1 tablet  1 tablet Oral Daily PRN    senna-docusate sodium (SENOKOT S) 8.6-50 mg per tablet 2 tablet  2 tablet Oral BID    traZODone (DESYREL) tablet 50 mg  50 mg Oral HS PRN    white petrolatum-mineral oil (EUCERIN,HYDROCERIN) cream   Topical TID PRN   .    Labs: I have personally reviewed all pertinent laboratory/tests results.     Counseling / Coordination of Care  Total floor / unit time spent today 30 minutes. Greater than 50% of total time was spent with the patient and / or family counseling and / or coordination of care. A description of the counseling / coordination of care: Medication management, treatment and chart review

## 2024-07-06 NOTE — PLAN OF CARE
Problem: Alteration in Thoughts and Perception  Goal: Verbalize thoughts and feelings  Description: Interventions:  - Promote a nonjudgmental and trusting relationship with the patient through active listening and therapeutic communication  - Assess patient's level of functioning, behavior and potential for risk  - Engage patient in 1 on 1 interactions  - Encourage patient to express fears, feelings, frustrations, and discuss symptoms    - [place] patient to reality, help patient recognize reality-based thinking   - Administer medications as ordered and assess for potential side effects  - Provide the patient education related to the signs and symptoms of the illness and desired effects of prescribed medications  Outcome: Not Progressing  Goal: Refrain from acting on delusional thinking/internal stimuli  Description: Interventions:  - Monitor patient closely, per order   - Utilize least restrictive measures   - Set reasonable limits, give positive feedback for acceptable   - Administer medications as ordered and monitor of potential side effects  Outcome: Not Progressing  Goal: Agree to be compliant with medication regime, as prescribed and report medication side effects  Description: Interventions:  - Offer appropriate PRN medication and supervise ingestion; conduct AIMS, as needed   Outcome: Progressing  Goal: Attend and participate in unit activities, including therapeutic, recreational, and educational groups  Description: Interventions:  -Encourage Visitation and family involvement in care  Outcome: Not Progressing  Goal: Recognize dysfunctional thoughts, communicate reality-based thoughts at the time of discharge  Description: Interventions:  - Provide medication and psycho-education to assist patient in compliance and developing insight into his/her illness   Outcome: Not Progressing  Goal: Complete daily ADLs, including personal hygiene independently, as able  Description: Interventions:  - Observe, teach,  and assist patient with ADLS  - Monitor and promote a balance of rest/activity, with adequate nutrition and elimination   Outcome: Not Progressing     Problem: Ineffective Coping  Goal: Identifies ineffective coping skills  Outcome: Not Progressing  Goal: Identifies healthy coping skills  Outcome: Progressing  Goal: Demonstrates healthy coping skills  Outcome: Not Progressing  Goal: Participates in unit activities  Description: Interventions:  - Provide therapeutic environment   - Provide required programming   - Redirect inappropriate behaviors   Outcome: Not Progressing  Goal: Patient/Family participate in treatment and DC plans  Description: Interventions:  - Provide therapeutic environment  Outcome: Not Progressing  Goal: Patient/Family verbalizes awareness of resources  Outcome: Not Progressing     Problem: Depression  Goal: Verbalize thoughts and feelings  Description: Interventions:  - Assess and re-assess patient's level of risk   - Engage patient in 1:1 interactions, daily, for a minimum of 15 minutes   - Encourage patient to express feelings, fears, frustrations, hopes   Outcome: Not Progressing  Goal: Refrain from harming self  Description: Interventions:  - Monitor patient closely, per order   - Supervise medication ingestion, monitor effects and side effects   Outcome: Progressing  Goal: Refrain from isolation  Description: Interventions:  - Develop a trusting relationship   - Encourage socialization   Outcome: Progressing  Goal: Attend and participate in unit activities, including therapeutic, recreational, and educational groups  Description: Interventions:  - Provide therapeutic and educational activities daily, encourage attendance and participation, and document same in the medical record   Outcome: Not Progressing  Goal: Complete daily ADLs, including personal hygiene independently, as able  Description: Interventions:  - Observe, teach, and assist patient with ADLS  -  Monitor and promote a  balance of rest/activity, with adequate nutrition and elimination   Outcome: Not Progressing     Problem: Anxiety  Goal: Anxiety is at manageable level  Description: Interventions:  - Assess and monitor patient's anxiety level.   - Monitor for signs and symptoms (heart palpitations, chest pain, shortness of breath, headaches, nausea, feeling jumpy, restlessness, irritable, apprehensive).   - Collaborate with interdisciplinary team and initiate plan and interventions as ordered.  - [place] patient to unit/surroundings  - Explain treatment plan  - Encourage participation in care  - Encourage verbalization of concerns/fears  - Identify coping mechanisms  - Assist in developing anxiety-reducing skills  - Administer/offer alternative therapies  - Limit or eliminate stimulants  Outcome: Not Progressing     Problem: Alteration in Orientation  Goal: Interact with staff daily  Description: Interventions:  - Assess and re-assess patient's level of orientation  - Engage patient in 1 on 1 interactions, daily, for a minimum of 15 minutes   - Establish rapport/trust with patient   Outcome: Progressing  Goal: Express concerns related to confused thinking related to:  Description: Interventions:  - Encourage patient to express feelings, fears, frustrations, hopes  - Assign consistent caregivers   - [place]/re-orient patient as needed  - Allow comfort items, as appropriate  - Provide visual cues, signs, etc.   Outcome: Not Progressing  Goal: Allow medical examinations, as recommended  Description: Interventions:  - Provide physical/neurological exams and/or referrals, per provider   Outcome: Progressing  Goal: Cooperate with recommended testing/procedures  Description: Interventions:  - Determine need for ancillary testing  - Observe for mental status changes  - Implement falls/precaution protocol   Outcome: Progressing  Goal: Attend and participate in unit activities, including therapeutic, recreational, and educational  groups  Description: Interventions:  - Provide therapeutic and educational activities daily, encourage attendance and participation, and document same in the medical record   - Provide appropriate opportunities for reminiscence   - Provide a consistent daily routine   - Encourage family contact/visitation   Outcome: Not Progressing  Goal: Complete daily ADLs, including personal hygiene independently, as able  Description: Interventions:  - Observe, teach, and assist patient with ADLS  Outcome: Not Progressing     Problem: Electroconvulsive therapy (ECT)  Goal: Verbalizes/displays adequate comfort level or baseline comfort level  Description: Interventions:  - Encourage patient to monitor pain and request assistance  - Assess pain using appropriate pain scale  - Administer analgesics based on type and severity of pain and evaluate response  - Implement non-pharmacological measures as appropriate and evaluate response  - Consider cultural and social influences on pain and pain management  - Notify physician/advanced practitioner if interventions unsuccessful or patient reports new pain  Outcome: Progressing  Goal: Achieves stable or improved neurological status  Description: INTERVENTIONS  - Monitor and report changes in neurological status  - Monitor vital signs such as temperature, blood pressure, glucose, and any other labs ordered   - Initiate measures to prevent increased intracranial pressure  - Monitor for seizure activity and implement precautions if appropriate      Outcome: Progressing  Goal: Achieves maximal functionality and self care  Description: INTERVENTIONS  - Monitor swallowing and airway patency with patient fatigue and changes in neurological status  - Encourage and assist patient to increase activity and self care.   - Encourage visually impaired, hearing impaired and aphasic patients to use assistive/communication devices  Outcome: Progressing  Goal: Maintain or return mobility to safest level of  function  Description: INTERVENTIONS:  - Assess patient's ability to carry out ADLs; assess patient's baseline for ADL function and identify physical deficits which impact ability to perform ADLs (bathing, care of mouth/teeth, toileting, grooming, dressing, etc.)  - Assess/evaluate cause of self-care deficits   - Assess range of motion  - Assess patient's mobility  - Assess patient's need for assistive devices and provide as appropriate  - Encourage maximum independence but intervene and supervise when necessary  - Involve family in performance of ADLs  - Assess for home care needs following discharge   - Consider OT consult to assist with ADL evaluation and planning for discharge  - Provide patient education as appropriate  Outcome: Progressing  Goal: Absence of urinary retention  Description: INTERVENTIONS:  - Assess patient’s ability to void and empty bladder  - Monitor I/O  - Bladder scan as needed  - Discuss with physician/AP medications to alleviate retention as needed  - Discuss catheterization for long term situations as appropriate  Outcome: Progressing  Goal: Minimal or absence of nausea and/or vomiting  Description: INTERVENTIONS:  - Administer IV fluids if ordered to ensure adequate hydration  - Maintain NPO status until nausea and vomiting are resolved  - Nasogastric tube if ordered  - Administer ordered antiemetic medications as needed  - Provide nonpharmacologic comfort measures as appropriate  - Advance diet as tolerated, if ordered  - Consider nutrition services referral to assist patient with adequate nutrition and appropriate food choices  Outcome: Progressing  Goal: Maintains adequate nutritional intake  Description: INTERVENTIONS:  - Monitor percentage of each meal consumed  - Identify factors contributing to decreased intake, treat as appropriate  - Assist with meals as needed  - Monitor I&O, weight, and lab values if indicated  - Obtain nutrition services referral as needed  Outcome:  Progressing     Problem: DISCHARGE PLANNING - CARE MANAGEMENT  Goal: Discharge to post-acute care or home with appropriate resources  Description: INTERVENTIONS:  - Conduct assessment to determine patient/family and health care team treatment goals, and need for post-acute services based on payer coverage, community resources, and patient preferences, and barriers to discharge  - Address psychosocial, clinical, and financial barriers to discharge as identified in assessment in conjunction with the patient/family and health care team  - Arrange appropriate level of post-acute services according to patient’s   needs and preference and payer coverage in collaboration with the physician and health care team  - Communicate with and update the patient/family, physician, and health care team regarding progress on the discharge plan  - Arrange appropriate transportation to post-acute venues  Outcome: Not Progressing      normal

## 2024-07-07 PROCEDURE — 99232 SBSQ HOSP IP/OBS MODERATE 35: CPT | Performed by: NURSE PRACTITIONER

## 2024-07-07 RX ADMIN — ESCITALOPRAM OXALATE 20 MG: 10 TABLET, FILM COATED ORAL at 08:44

## 2024-07-07 RX ADMIN — NICOTINE POLACRILEX 2 MG: 2 GUM, CHEWING ORAL at 21:21

## 2024-07-07 RX ADMIN — HYDROCHLOROTHIAZIDE 12.5 MG: 12.5 TABLET ORAL at 08:45

## 2024-07-07 RX ADMIN — MELATONIN TAB 3 MG 3 MG: 3 TAB at 21:20

## 2024-07-07 RX ADMIN — SENNOSIDES AND DOCUSATE SODIUM 2 TABLET: 8.6; 5 TABLET ORAL at 08:45

## 2024-07-07 RX ADMIN — LORATADINE 10 MG: 10 TABLET ORAL at 08:44

## 2024-07-07 RX ADMIN — NICOTINE POLACRILEX 2 MG: 2 GUM, CHEWING ORAL at 17:22

## 2024-07-07 RX ADMIN — METFORMIN HYDROCHLORIDE 500 MG: 500 TABLET, FILM COATED ORAL at 17:14

## 2024-07-07 RX ADMIN — GLYCOPYRROLATE 1 MG: 1 TABLET ORAL at 08:45

## 2024-07-07 RX ADMIN — PROPRANOLOL HYDROCHLORIDE 10 MG: 10 TABLET ORAL at 21:20

## 2024-07-07 RX ADMIN — AMLODIPINE BESYLATE 5 MG: 5 TABLET ORAL at 08:45

## 2024-07-07 RX ADMIN — FAMOTIDINE 20 MG: 20 TABLET ORAL at 08:45

## 2024-07-07 RX ADMIN — NICOTINE POLACRILEX 2 MG: 2 GUM, CHEWING ORAL at 08:45

## 2024-07-07 RX ADMIN — ARIPIPRAZOLE 15 MG: 15 TABLET ORAL at 08:44

## 2024-07-07 RX ADMIN — PANTOPRAZOLE SODIUM 20 MG: 20 TABLET, DELAYED RELEASE ORAL at 06:25

## 2024-07-07 RX ADMIN — CYANOCOBALAMIN TAB 1000 MCG 1000 MCG: 1000 TAB at 08:45

## 2024-07-07 RX ADMIN — LIDOCAINE HYDROCHLORIDE 15 ML: 20 SOLUTION ORAL at 20:29

## 2024-07-07 RX ADMIN — CLOZAPINE 500 MG: 100 TABLET ORAL at 21:21

## 2024-07-07 RX ADMIN — PROPRANOLOL HYDROCHLORIDE 10 MG: 10 TABLET ORAL at 08:45

## 2024-07-07 RX ADMIN — GLYCOPYRROLATE 1 MG: 1 TABLET ORAL at 17:14

## 2024-07-07 RX ADMIN — POLYETHYLENE GLYCOL 3350 17 G: 17 POWDER, FOR SOLUTION ORAL at 08:46

## 2024-07-07 RX ADMIN — LIDOCAINE HYDROCHLORIDE 15 ML: 20 SOLUTION ORAL at 08:57

## 2024-07-07 RX ADMIN — FLUTICASONE PROPIONATE 1 SPRAY: 50 SPRAY, METERED NASAL at 08:45

## 2024-07-07 RX ADMIN — GLYCOPYRROLATE 1 MG: 1 TABLET ORAL at 21:21

## 2024-07-07 RX ADMIN — SENNOSIDES AND DOCUSATE SODIUM 2 TABLET: 8.6; 5 TABLET ORAL at 17:14

## 2024-07-07 RX ADMIN — FAMOTIDINE 20 MG: 20 TABLET ORAL at 17:14

## 2024-07-07 RX ADMIN — METFORMIN HYDROCHLORIDE 500 MG: 500 TABLET, FILM COATED ORAL at 08:45

## 2024-07-07 NOTE — PLAN OF CARE
Problem: Alteration in Thoughts and Perception  Goal: Verbalize thoughts and feelings  Description: Interventions:  - Promote a nonjudgmental and trusting relationship with the patient through active listening and therapeutic communication  - Assess patient's level of functioning, behavior and potential for risk  - Engage patient in 1 on 1 interactions  - Encourage patient to express fears, feelings, frustrations, and discuss symptoms    - [place] patient to reality, help patient recognize reality-based thinking   - Administer medications as ordered and assess for potential side effects  - Provide the patient education related to the signs and symptoms of the illness and desired effects of prescribed medications  Outcome: Progressing  Goal: Refrain from acting on delusional thinking/internal stimuli  Description: Interventions:  - Monitor patient closely, per order   - Utilize least restrictive measures   - Set reasonable limits, give positive feedback for acceptable   - Administer medications as ordered and monitor of potential side effects  Outcome: Progressing  Goal: Agree to be compliant with medication regime, as prescribed and report medication side effects  Description: Interventions:  - Offer appropriate PRN medication and supervise ingestion; conduct AIMS, as needed   Outcome: Progressing  Goal: Attend and participate in unit activities, including therapeutic, recreational, and educational groups  Description: Interventions:  -Encourage Visitation and family involvement in care  Outcome: Progressing  Goal: Recognize dysfunctional thoughts, communicate reality-based thoughts at the time of discharge  Description: Interventions:  - Provide medication and psycho-education to assist patient in compliance and developing insight into his/her illness   Outcome: Progressing  Goal: Complete daily ADLs, including personal hygiene independently, as able  Description: Interventions:  - Observe, teach, and assist  patient with ADLS  - Monitor and promote a balance of rest/activity, with adequate nutrition and elimination   Outcome: Progressing     Problem: Depression  Goal: Refrain from harming self  Description: Interventions:  - Monitor patient closely, per order   - Supervise medication ingestion, monitor effects and side effects   Outcome: Progressing  Goal: Refrain from isolation  Description: Interventions:  - Develop a trusting relationship   - Encourage socialization   Outcome: Progressing  Goal: Refrain from self-neglect  Outcome: Progressing

## 2024-07-07 NOTE — NURSING NOTE
Alberto maintained on ongoing SAFE precaution without incident on this shift.  Awake, alert , visible, pleasant and cooperative upon approach.   Attended and participated in 5 out of 8 groups today.  Continues to be compliant with medication regimen and had snack.  Denies any depression or anxiety.  No delusion, but admit to auditory hallucination, hearing the same things about telling him they going to kill him and his family. He is able to ignore the voice by surrounding himself with his peers.  Behavioral control

## 2024-07-07 NOTE — NURSING NOTE
"Pt calm and cooperative. Visible and social on the unit. +meals and meds. Good appetite. Compliant w/ mouth checks. Minimal, but appropriate on assessment. Denies SI/HI. Endorses A/bautista, but describes then as \"the same\". States,\"They're not really bothering me.\" Staff support provided, and encouragement to notify staff of any increase in psychosis or need to utilize PRN medication. Q7's maintained. Will cont to provide support as needed.   "

## 2024-07-07 NOTE — NURSING NOTE
Weekly wellness assessment completed. Pt lung sounds clear in all lung fields. Trace edema noted to b/l lower ext. Bowel sounds +x4. B/l pedal pulses papable. Skin intact, warm and color within normal limits for patient Dryness noted to b/l lower feet.

## 2024-07-07 NOTE — NURSING NOTE
Alberto maintain on ongoing SAFE precaution without incident on this shift. Observed resting in bed, respiration even and unlabored. Continuous Q 7 minutes check implemented thru the night. Took his medication this morning with water.  No behavioral noted

## 2024-07-07 NOTE — PROGRESS NOTES
"Progress Note - Behavioral Health   Alberto Berumen 28 y.o. male MRN: [id number]  Unit/Bed#: [facility] 108-02 Encounter: [id number]    Assessment & Plan   Principal Problem:    Schizoaffective disorder, bipolar type (HCC)  Active Problems:    GERD (gastroesophageal reflux disease)    Medical clearance for psychiatric admission    Tobacco abuse    T wave inversion in EKG    Chronic idiopathic constipation    Confluent and reticulate papillomatosis    Primary hypertension    Elevated hemoglobin A1c    Bilateral lower extremity edema    Patient was seen for continuing care and treatment.  Case was discussed with nursing staff.  Patient is seen lying in his bed.  Pleasant on approach.  Overall cooperative.  Continues with auditory hallucinations of voices telling him that they will \"kill him and his family \".  No behavior issues noted.  No violent behaviors noted.  Still complaining of tongue discomfort.  This occurred during ECT treatment.  Otherwise, no other complaints.  Behavior over the last 24 hours has been unchanged.  Cooperative and polite with staff.  Continues to require inpatient care and treatment.  Slept well overnight and has been eating well.  No complaints of medication side effects and no new medical concerns.      Mental Status Evaluation:  Appearance:  age appropriate, casually dressed, and disheveled   Behavior:  Polite and cooperative   Speech:  normal pitch and normal volume   Mood:  anxious   Affect:  mood-congruent   Thought Process:  disorganized and illogical   Associations: loose associations   Thought Content:  delusions  persecutory and obsessions   Perceptual Disturbances: Auditory hallucinations without commands   Risk Potential: Suicidal Ideations none  Homicidal Ideations none  Potential for Aggression No   Sensorium:  person, place, and time/date   Memory:  recent and remote memory grossly intact   Consciousness:  alert and awake    Attention: attention span appeared shorter than expected " for age   Insight:  limited   Judgment: limited   Gait/Station: normal gait/station   Motor Activity: no abnormal movements     Progress Toward Goals: ECT to continue on July 19.  Continues with auditory hallucinations that threaten to kill him and his family.  As a result, patient remains anxious and depressed.  Continue inpatient treatment.    Recommended Treatment: Continue with group therapy, milieu therapy and occupational therapy.      Risks, benefits and possible side effects of Medications:   Risks, benefits, and possible side effects of medications explained to patient and patient verbalizes understanding.      Medications: current meds:   Current Facility-Administered Medications   Medication Dose Route Frequency    acetaminophen (TYLENOL) tablet 650 mg  650 mg Oral Q4H PRN    acetaminophen (TYLENOL) tablet 650 mg  650 mg Oral Q6H PRN    aluminum-magnesium hydroxide-simethicone (MAALOX) oral suspension 30 mL  30 mL Oral Q4H PRN    amLODIPine (NORVASC) tablet 5 mg  5 mg Oral Daily    ARIPiprazole (ABILIFY) tablet 15 mg  15 mg Oral Daily    Artificial Tears ophthalmic solution 1 drop  1 drop Both Eyes Q3H PRN    atropine (ISOPTO ATROPINE) 1 % ophthalmic solution 1 drop  1 drop Sublingual Daily PRN    haloperidol lactate (HALDOL) injection 2.5 mg  2.5 mg Intramuscular Q4H PRN Max 4/day    And    LORazepam (ATIVAN) injection 1 mg  1 mg Intramuscular Q4H PRN Max 4/day    And    benztropine (COGENTIN) injection 0.5 mg  0.5 mg Intramuscular Q4H PRN Max 4/day    benztropine (COGENTIN) injection 1 mg  1 mg Intramuscular Q4H PRN Max 6/day    haloperidol lactate (HALDOL) injection 5 mg  5 mg Intramuscular Q4H PRN Max 4/day    And    LORazepam (ATIVAN) injection 2 mg  2 mg Intramuscular Q4H PRN Max 4/day    And    benztropine (COGENTIN) injection 1 mg  1 mg Intramuscular Q4H PRN Max 4/day    benztropine (COGENTIN) tablet 1 mg  1 mg Oral Q4H PRN Max 6/day    benztropine (COGENTIN) tablet 1 mg  1 mg Oral Q4H PRN Max  6/day    bisacodyl (DULCOLAX) rectal suppository 10 mg  10 mg Rectal Daily PRN    calcium carbonate (TUMS) chewable tablet 500 mg  500 mg Oral BID PRN    cloZAPine (CLOZARIL) tablet 500 mg  500 mg Oral HS    cyanocobalamin (VITAMIN B-12) tablet 1,000 mcg  1,000 mcg Oral Daily    hydrOXYzine HCL (ATARAX) tablet 50 mg  50 mg Oral Q6H PRN Max 4/day    Or    diphenhydrAMINE (BENADRYL) injection 50 mg  50 mg Intramuscular Q6H PRN    hydrOXYzine HCL (ATARAX) tablet 50 mg  50 mg Oral Q6H PRN Max 4/day    Or    diphenhydrAMINE (BENADRYL) injection 50 mg  50 mg Intramuscular Q6H PRN    diphenhydrAMINE-zinc acetate (BENADRYL) 2-0.1 % cream   Topical BID PRN    escitalopram (LEXAPRO) tablet 20 mg  20 mg Oral Daily    famotidine (PEPCID) tablet 20 mg  20 mg Oral BID    fluticasone (FLONASE) 50 mcg/act nasal spray 1 spray  1 spray Each Nare Daily    glycopyrrolate (ROBINUL) tablet 1 mg  1 mg Oral TID    haloperidol (HALDOL) tablet 1 mg  1 mg Oral Q6H PRN    haloperidol (HALDOL) tablet 2.5 mg  2.5 mg Oral Q4H PRN Max 4/day    haloperidol (HALDOL) tablet 5 mg  5 mg Oral Q4H PRN Max 4/day    hydroCHLOROthiazide tablet 12.5 mg  12.5 mg Oral Daily    hydrocortisone 1 % ointment   Topical 4x Daily PRN    hydrOXYzine HCL (ATARAX) tablet 100 mg  100 mg Oral Q6H PRN Max 4/day    Or    LORazepam (ATIVAN) injection 2 mg  2 mg Intramuscular Q6H PRN    hydrOXYzine HCL (ATARAX) tablet 100 mg  100 mg Oral Q6H PRN Max 4/day    Or    LORazepam (ATIVAN) injection 2 mg  2 mg Intramuscular Q6H PRN    hydrOXYzine HCL (ATARAX) tablet 25 mg  25 mg Oral Q6H PRN Max 4/day    ibuprofen (MOTRIN) tablet 600 mg  600 mg Oral Q8H PRN    lactated ringers infusion  75 mL/hr Intravenous Continuous    lactated ringers infusion  50 mL/hr Intravenous Continuous    lactated ringers infusion  50 mL/hr Intravenous Continuous    Lidocaine Viscous HCl (XYLOCAINE) 2 % mucosal solution 15 mL  15 mL Swish & Spit 4x Daily PRN    loratadine (CLARITIN) tablet 10 mg  10 mg  Oral Daily    melatonin tablet 3 mg  3 mg Oral HS    metFORMIN (GLUCOPHAGE) tablet 500 mg  500 mg Oral BID With Meals    methocarbamol (ROBAXIN) tablet 500 mg  500 mg Oral Q6H PRN    nicotine polacrilex (NICORETTE) gum 2 mg  2 mg Oral Q4H PRN    OLANZapine (ZyPREXA) tablet 5 mg  5 mg Oral Q4H PRN Max 3/day    Or    OLANZapine (ZyPREXA) IM injection 2.5 mg  2.5 mg Intramuscular Q4H PRN Max 3/day    OLANZapine (ZyPREXA) tablet 5 mg  5 mg Oral Q3H PRN Max 3/day    Or    OLANZapine (ZyPREXA) IM injection 5 mg  5 mg Intramuscular Q3H PRN Max 3/day    OLANZapine (ZyPREXA) tablet 2.5 mg  2.5 mg Oral Q4H PRN Max 6/day    ondansetron (ZOFRAN-ODT) dispersible tablet 4 mg  4 mg Oral Q6H PRN    pantoprazole (PROTONIX) EC tablet 20 mg  20 mg Oral Early Morning    polyethylene glycol (MIRALAX) packet 17 g  17 g Oral Daily    polyethylene glycol (MIRALAX) packet 17 g  17 g Oral Daily PRN    propranolol (INDERAL) tablet 10 mg  10 mg Oral Q12H KAYLIE    senna-docusate sodium (SENOKOT S) 8.6-50 mg per tablet 1 tablet  1 tablet Oral Daily PRN    senna-docusate sodium (SENOKOT S) 8.6-50 mg per tablet 2 tablet  2 tablet Oral BID    traZODone (DESYREL) tablet 50 mg  50 mg Oral HS PRN    white petrolatum-mineral oil (EUCERIN,HYDROCERIN) cream   Topical TID PRN   .    Labs: I have personally reviewed all pertinent laboratory/tests results.     Counseling / Coordination of Care  Total floor / unit time spent today 30 minutes. Greater than 50% of total time was spent with the patient and / or family counseling and / or coordination of care. A description of the counseling / coordination of care: Medication management, treatment and chart review

## 2024-07-08 PROCEDURE — 99232 SBSQ HOSP IP/OBS MODERATE 35: CPT | Performed by: PSYCHIATRY & NEUROLOGY

## 2024-07-08 RX ADMIN — PROPRANOLOL HYDROCHLORIDE 10 MG: 10 TABLET ORAL at 21:17

## 2024-07-08 RX ADMIN — METFORMIN HYDROCHLORIDE 500 MG: 500 TABLET, FILM COATED ORAL at 09:20

## 2024-07-08 RX ADMIN — PANTOPRAZOLE SODIUM 20 MG: 20 TABLET, DELAYED RELEASE ORAL at 06:22

## 2024-07-08 RX ADMIN — GLYCOPYRROLATE 1 MG: 1 TABLET ORAL at 09:20

## 2024-07-08 RX ADMIN — POLYETHYLENE GLYCOL 3350 17 G: 17 POWDER, FOR SOLUTION ORAL at 09:19

## 2024-07-08 RX ADMIN — FAMOTIDINE 20 MG: 20 TABLET ORAL at 17:26

## 2024-07-08 RX ADMIN — MELATONIN TAB 3 MG 3 MG: 3 TAB at 21:17

## 2024-07-08 RX ADMIN — ARIPIPRAZOLE 15 MG: 15 TABLET ORAL at 09:19

## 2024-07-08 RX ADMIN — NICOTINE POLACRILEX 2 MG: 2 GUM, CHEWING ORAL at 21:17

## 2024-07-08 RX ADMIN — LORATADINE 10 MG: 10 TABLET ORAL at 09:19

## 2024-07-08 RX ADMIN — GLYCOPYRROLATE 1 MG: 1 TABLET ORAL at 21:17

## 2024-07-08 RX ADMIN — NICOTINE POLACRILEX 2 MG: 2 GUM, CHEWING ORAL at 17:26

## 2024-07-08 RX ADMIN — METFORMIN HYDROCHLORIDE 500 MG: 500 TABLET, FILM COATED ORAL at 17:25

## 2024-07-08 RX ADMIN — ESCITALOPRAM OXALATE 20 MG: 10 TABLET, FILM COATED ORAL at 09:05

## 2024-07-08 RX ADMIN — HYDROCHLOROTHIAZIDE 12.5 MG: 12.5 TABLET ORAL at 09:20

## 2024-07-08 RX ADMIN — CYANOCOBALAMIN TAB 1000 MCG 1000 MCG: 1000 TAB at 09:20

## 2024-07-08 RX ADMIN — NICOTINE POLACRILEX 2 MG: 2 GUM, CHEWING ORAL at 09:20

## 2024-07-08 RX ADMIN — LIDOCAINE HYDROCHLORIDE 15 ML: 20 SOLUTION ORAL at 14:25

## 2024-07-08 RX ADMIN — LIDOCAINE HYDROCHLORIDE 15 ML: 20 SOLUTION ORAL at 19:09

## 2024-07-08 RX ADMIN — FLUTICASONE PROPIONATE 1 SPRAY: 50 SPRAY, METERED NASAL at 09:03

## 2024-07-08 RX ADMIN — PROPRANOLOL HYDROCHLORIDE 10 MG: 10 TABLET ORAL at 09:00

## 2024-07-08 RX ADMIN — CLOZAPINE 500 MG: 100 TABLET ORAL at 21:17

## 2024-07-08 RX ADMIN — SENNOSIDES AND DOCUSATE SODIUM 2 TABLET: 8.6; 5 TABLET ORAL at 17:25

## 2024-07-08 RX ADMIN — FAMOTIDINE 20 MG: 20 TABLET ORAL at 09:27

## 2024-07-08 RX ADMIN — SENNOSIDES AND DOCUSATE SODIUM 2 TABLET: 8.6; 5 TABLET ORAL at 09:20

## 2024-07-08 RX ADMIN — NICOTINE POLACRILEX 2 MG: 2 GUM, CHEWING ORAL at 13:27

## 2024-07-08 RX ADMIN — GLYCOPYRROLATE 1 MG: 1 TABLET ORAL at 17:26

## 2024-07-08 NOTE — PROGRESS NOTES
Psychiatry Progress Note [facility]    Alberto Berumen 28 y.o. male MRN: [id number]  Unit/Bed#: [facility] 108-02 Encounter: [id number]  Code Status: Level 1 - Full Code    PCP: Joce Juan MD    Date of Admission:  3/29/2024 2008   Date of Service:  07/08/24    Patient Active Problem List   Diagnosis    GERD (gastroesophageal reflux disease)    Medical clearance for psychiatric admission    Schizoaffective disorder, bipolar type (HCC)    Tobacco abuse    T wave inversion in EKG    Syringoma    Chronic idiopathic constipation    Vitamin B 12 deficiency    Vitamin D deficiency    Confluent and reticulate papillomatosis    Class 2 obesity in adult    Primary hypertension    Elevated hemoglobin A1c    Bilateral lower extremity edema     Review of systems: Unremarkable claims to have bit his tongue during ECT last Friday and is recovered, on xylocaine swish for that  Psychiatric diagnosis: Schizoaffective bipolar type  assessment  Overall Status: Still with threatening voices that threaten to kill him and his family but believes ECT is helping and still appears withdrawn and isolated and sad over the voices which he has been hearing for more than 2 years  certification Statement: The patient will continue to require additional inpatient hospital stay due to ongoing voices that are threatening to kill him and his family lack of response to medications and ECT so far.     Medications: Clozapine 500 mg at bedtime, propranolol 10 mg every 12 hours as as needed for anxiety, Abilify 15 mg mg at bedtime Lexapro 20 mg once a day, atropine eyedrops sublingual for drooling of saliva and senna 2 tablets twice a day for constipation  Medications reviewed and recommend they be continued for symptom management  side effects from treatment: None reported  Medication changes   None today  Medication education   Risks side effects benefits and precautions of medications discussed with patient and he did verbalize an  understanding about risks for metabolic syndrome from being on neuroleptics and is form tardive dyskinesia etc. and special precautions about being on clozapine   Understanding of medications: Has some understanding   Justification for dual anti-psychotics: Not applicable    Non-pharmacological treatments  Continue with individual, group, milieu and occupational therapy using recovery principles and psycho-education about accepting illness and the need for treatment.   to contact aunt and explore ACT team referral which he never had  ECT to be continued  q 2 weekly for maintenance x 6  Refuses to see a dietician and agrees to do more exercises as he is about 100 lbs overweight   Prolactin level high so Risperdal replaced with Abilify    Safety  Safety and communication plan established to target dynamic risk factors discussed above.    Discharge Plan   Back to his aunt with an ACT team    Interval Progress   Continues to report threatening voices to kill him and his family when he gets out which are not commanding at this time and reports no passive death wishes and continues to insist he is not suicidal and able to contract for safety but appears sad about the voices not going away completely.  Still has a flat to constricted affect but believes ECT has helped him so that he is not too much in his head according to him.  No behavioral PRNs needed lately and not aggressive or agitated or threatening and self-abusive but remains evasive guarded preoccupied and is looking forward to continuing with the maintenance ECTs every 2 weeks  I Acceptance by patient: Accepting  Hopefulness in recovery: Living with his sister and aunt  Involved in reintegration process: Talking to aunt and sister  Trusting in relationship with psychiatrist: Trusting  Sleep: Good  Appetite: Good  Compliance with Medications: Good  Group attendance: Some groups 4/9 last Friday  Significant events: Status quo with no significant  changes     mental Status Exam  Appearance: age appropriate, improved grooming, looks older than stated age, overweight, with hair in dreadlocks casually dressed with a clean shaven face, fairly groomed with good eye contact  attended team meeting dressed in hospital clothing preoccupied aloof  behavior: cooperative, mildly anxious, evasive, gesturing, slow responses.   speech: normal rate, normal volume, normal pitch  Mood: dysphoric, anxious, continues to report feeling good with the ECT   affect: constricted, inappropriate, mood-congruent sad and anxious with constricted to flat affect   Thought Process: organized, logical, coherent, goal directed, linear, decreased rate of thoughts, slowing of thoughts, negative thinking, impaired abstract reasoning, concrete  Thought Content: paranoid ideation, some paranoia, grandiose ideas, intrusive thoughts, preoccupied, chronic, continues to report paranoia about people threatening to kill him and his family because of the voices.  He believes ECT has helped so that he is not much in his head.  No other delusions elicited.  No current suicidal or homicidal thoughts and no plans verbalized but today reports having passive death wishes because of voices with no plans and able to CFS and it has not changed yet  .  No phobias obsessions compulsions or distorted body perceptions reported.  Preoccupied with wanting to get ECTs more often despite reminding him that it may impair his memory and finally he agreed to such as it is to every 2 weeks  Perceptual Disturbances: continues to hear same voices threatening to kill him and his family   Hx Risk Factors: chronic psychiatric problems, chronic anxiety symptoms, chronic psychotic symptoms,    Sensorium: oriented x 3 spheres and situation  Cognition: recent and remote memory grossly intact  Consciousness: alert and awake  Attention: attention span and concentration are age appropriate  Intellect: appears to be of average  intelligence  Insight: intact  Judgement: intact  Motor Activity: no abnormal movements     Vitals  Temp:  [97.8 °F (36.6 °C)] 97.8 °F (36.6 °C)  HR:  [] 100  Resp:  [18-19] 18  BP: (119-131)/(71-84) 119/84  SpO2:  [99 %-100 %] 100 %  No intake or output data in the 24 hours ending 07/08/24 0503              Lab Results: All Labs For Current Hospital Admission Reviewed     Current Facility-Administered Medications   Medication Dose Route Frequency Provider Last Rate    acetaminophen  650 mg Oral Q4H PRN Jordan C Holter, DO      acetaminophen  650 mg Oral Q6H PRN HOLLI Lion      aluminum-magnesium hydroxide-simethicone  30 mL Oral Q4H PRN Jordan C Holter, DO      amLODIPine  5 mg Oral Daily STEVE LionNP      ARIPiprazole  15 mg Oral Daily Bora Rosario MD      Artificial Tears  1 drop Both Eyes Q3H PRN Jordan C Holter, DO      atropine  1 drop Sublingual Daily PRN HOLLI Lion      haloperidol lactate  2.5 mg Intramuscular Q4H PRN Max 4/day STEVE LionNP      And    LORazepam  1 mg Intramuscular Q4H PRN Max 4/day STEVE LionNP      And    benztropine  0.5 mg Intramuscular Q4H PRN Max 4/day STEVE LionNP      benztropine  1 mg Intramuscular Q4H PRN Max 6/day Jordan C Holter, DO      haloperidol lactate  5 mg Intramuscular Q4H PRN Max 4/day STEVE LionNP      And    LORazepam  2 mg Intramuscular Q4H PRN Max 4/day STEVE LionNP      And    benztropine  1 mg Intramuscular Q4H PRN Max 4/day HOLLI Lion      benztropine  1 mg Oral Q4H PRN Max 6/day HOLLI Lion      benztropine  1 mg Oral Q4H PRN Max 6/day Jordan C Holter, DO      bisacodyl  10 mg Rectal Daily PRN HOLLI Lion      calcium carbonate  500 mg Oral BID PRN STEVE MongeNP      cloZAPine  500 mg Oral HS Bora Rosario MD      cyanocobalamin  1,000 mcg Oral Daily HOLLI Galvan      hydrOXYzine HCL  50 mg Oral Q6H PRN Max 4/day Jordan C Holter, DO      Or     diphenhydrAMINE  50 mg Intramuscular Q6H PRN Jordan C Holter, DO      hydrOXYzine HCL  50 mg Oral Q6H PRN Max 4/day HOLLI Lion      Or    diphenhydrAMINE  50 mg Intramuscular Q6H PRN HOLLI Lion      diphenhydrAMINE-zinc acetate   Topical BID PRN HOLLI Lion      escitalopram  20 mg Oral Daily HOLLI Lion      famotidine  20 mg Oral BID Eileen CherryHOLLI Jimenez      fluticasone  1 spray Each Nare Daily Brina Guillen MD      glycopyrrolate  1 mg Oral TID Bora Rosario MD      haloperidol  1 mg Oral Q6H PRN HOLLI Lion      haloperidol  2.5 mg Oral Q4H PRN Max 4/day HOLLI Lion      haloperidol  5 mg Oral Q4H PRN Max 4/day HOLLI Lion      hydroCHLOROthiazide  12.5 mg Oral Daily HOLLI Galvan      hydrocortisone   Topical 4x Daily PRN HOLLI Lion      hydrOXYzine HCL  100 mg Oral Q6H PRN Max 4/day Jordan C Holter, DO      Or    LORazepam  2 mg Intramuscular Q6H PRN Jordan C Holter, DO      hydrOXYzine HCL  100 mg Oral Q6H PRN Max 4/day HOLLI Lion      Or    LORazepam  2 mg Intramuscular Q6H PRN HOLLI Lion      hydrOXYzine HCL  25 mg Oral Q6H PRN Max 4/day Jordan C Holter, DO      ibuprofen  600 mg Oral Q8H PRN HOLLI Lion      lactated ringers  75 mL/hr Intravenous Continuous Anjel Arriaza CRNA Stopped (06/21/24 0729)    lactated ringers  50 mL/hr Intravenous Continuous Ramya Arana MD Stopped (06/21/24 0729)    lactated ringers  50 mL/hr Intravenous Continuous Ramya Arana MD Stopped (06/21/24 0729)    Lidocaine Viscous HCl  15 mL Swish & Spit 4x Daily PRN HOLLI Galvan      loratadine  10 mg Oral Daily Brina Guillen MD      melatonin  3 mg Oral HS Jordan C Holter,       metFORMIN  500 mg Oral BID With Meals HOLLI Galvan      methocarbamol  500 mg Oral Q6H PRN HOLLI Lion      nicotine polacrilex  2 mg Oral Q4H PRN Bora Rosario MD      OLANZapine  5 mg Oral Q4H PRN Max  3/day Ion C Holter, DO      Or    OLANZapine  2.5 mg Intramuscular Q4H PRN Max 3/day Ion C Holter, DO      OLANZapine  5 mg Oral Q3H PRN Max 3/day Ion C Holter, DO      Or    OLANZapine  5 mg Intramuscular Q3H PRN Max 3/day Ion C Holter, DO      OLANZapine  2.5 mg Oral Q4H PRN Max 6/day Ion C Holter, DO      ondansetron  4 mg Oral Q6H PRN HOLLI Lion      pantoprazole  20 mg Oral Early Morning Eileen Gonzalezmiryamjaylen, STEVENP      polyethylene glycol  17 g Oral Daily HOLLI Lion      polyethylene glycol  17 g Oral Daily PRN Ion Dupontter, DO      propranolol  10 mg Oral Q12H KAYLIE HOLLI Lion      senna-docusate sodium  1 tablet Oral Daily PRN Jordan C Holter, DO      senna-docusate sodium  2 tablet Oral BID HOLLI Lion      traZODone  50 mg Oral HS PRN HOLLI Lion      white petrolatum-mineral oil   Topical TID PRN HOLLI Lion         Counseling / Coordination of Care: Total floor / unit time spent today 15 minutes. Greater than 50% of total time was spent with the patient and / or family counseling and / or somewhat receptive to supportive listening and teaching positive coping skills to deal with symptom mangement.     Patient's Rights, confidentiality and exceptions to confidentiality, use of automated medical record, Behavioral Health Services staff access to medical record, and consent to treatment reviewed.    This note has been dictated and hence there may be problems with punctuation, spelling and formatting and if anyone has any concerns please address them to Dr. Rosario   This note is not shared with patient due to potential for making patient's condition worse by knowing the content of the note.

## 2024-07-08 NOTE — PROGRESS NOTES
07/08/24 1124   Team Meeting   Meeting Type Tx Team Meeting   Initial Conference Date 07/08/24   Next Conference Date 07/22/24   Team Members Present   Team Members Present Physician;Nurse;;Other (Discipline and Name)   Physician Team Member Abraham   Nursing Team Member Lance   Social Work Team Member Jenny ORTEGA   Other (Discipline and Name) Gaby- CMP-MHDS; Sharlene CMP-MHDS   Patient/Family Present   Patient Present Yes   Patient's Family Present No   OTHER   Team Meeting - Additional Comments Pt attended his tx team meeting. Pt was groggy and reported feeling excessively tired. Pt read through part of his completed self assessment with no issue. Pt reports voices are ongoing and he is managing them. No bx concerns noted.

## 2024-07-08 NOTE — PLAN OF CARE
Problem: Ineffective Coping  Goal: Identifies ineffective coping skills  Outcome: Progressing  Goal: Identifies healthy coping skills  Outcome: Progressing  Goal: Demonstrates healthy coping skills  Outcome: Progressing  Goal: Participates in unit activities  Description: Interventions:  - Provide therapeutic environment   - Provide required programming   - Redirect inappropriate behaviors   Outcome: Progressing    Attended 28/39 of the groups offered last week.

## 2024-07-08 NOTE — PLAN OF CARE
Problem: Alteration in Thoughts and Perception  Goal: Verbalize thoughts and feelings  Description: Interventions:  - Promote a nonjudgmental and trusting relationship with the patient through active listening and therapeutic communication  - Assess patient's level of functioning, behavior and potential for risk  - Engage patient in 1 on 1 interactions  - Encourage patient to express fears, feelings, frustrations, and discuss symptoms    - [place] patient to reality, help patient recognize reality-based thinking   - Administer medications as ordered and assess for potential side effects  - Provide the patient education related to the signs and symptoms of the illness and desired effects of prescribed medications  Outcome: Progressing  Goal: Refrain from acting on delusional thinking/internal stimuli  Description: Interventions:  - Monitor patient closely, per order   - Utilize least restrictive measures   - Set reasonable limits, give positive feedback for acceptable   - Administer medications as ordered and monitor of potential side effects  Outcome: Progressing  Goal: Attend and participate in unit activities, including therapeutic, recreational, and educational groups  Description: Interventions:  -Encourage Visitation and family involvement in care  Outcome: Progressing  Goal: Complete daily ADLs, including personal hygiene independently, as able  Description: Interventions:  - Observe, teach, and assist patient with ADLS  - Monitor and promote a balance of rest/activity, with adequate nutrition and elimination   Outcome: Progressing     Problem: Ineffective Coping  Goal: Participates in unit activities  Description: Interventions:  - Provide therapeutic environment   - Provide required programming   - Redirect inappropriate behaviors   Outcome: Progressing     Problem: Depression  Goal: Refrain from harming self  Description: Interventions:  - Monitor patient closely, per order   - Supervise medication  ingestion, monitor effects and side effects   Outcome: Progressing  Goal: Refrain from isolation  Description: Interventions:  - Develop a trusting relationship   - Encourage socialization   Outcome: Progressing  Goal: Refrain from self-neglect  Outcome: Progressing     Problem: Alteration in Thoughts and Perception  Goal: Treatment Goal: Gain control of psychotic behaviors/thinking, reduce/eliminate presenting symptoms and demonstrate improved reality functioning upon discharge  Outcome: Not Progressing

## 2024-07-08 NOTE — NURSING NOTE
Patient isolates to room today, attending no groups so far. Pt requests lidocaine swish for tongue soreness at 1425. Pt does come out for meals and nicotine gum, otherwise minimal needs. Pt reports no s/s, no behavioral issues. Pt takes all medications without issue.

## 2024-07-09 PROCEDURE — 99232 SBSQ HOSP IP/OBS MODERATE 35: CPT | Performed by: PSYCHIATRY & NEUROLOGY

## 2024-07-09 RX ADMIN — CLOZAPINE 500 MG: 100 TABLET ORAL at 21:06

## 2024-07-09 RX ADMIN — PROPRANOLOL HYDROCHLORIDE 10 MG: 10 TABLET ORAL at 21:06

## 2024-07-09 RX ADMIN — GLYCOPYRROLATE 1 MG: 1 TABLET ORAL at 21:06

## 2024-07-09 RX ADMIN — FAMOTIDINE 20 MG: 20 TABLET ORAL at 17:15

## 2024-07-09 RX ADMIN — SENNOSIDES AND DOCUSATE SODIUM 2 TABLET: 8.6; 5 TABLET ORAL at 17:15

## 2024-07-09 RX ADMIN — SENNOSIDES AND DOCUSATE SODIUM 2 TABLET: 8.6; 5 TABLET ORAL at 08:44

## 2024-07-09 RX ADMIN — AMLODIPINE BESYLATE 5 MG: 5 TABLET ORAL at 08:45

## 2024-07-09 RX ADMIN — HYDROCHLOROTHIAZIDE 12.5 MG: 12.5 TABLET ORAL at 08:45

## 2024-07-09 RX ADMIN — PROPRANOLOL HYDROCHLORIDE 10 MG: 10 TABLET ORAL at 08:44

## 2024-07-09 RX ADMIN — LIDOCAINE HYDROCHLORIDE 15 ML: 20 SOLUTION ORAL at 20:12

## 2024-07-09 RX ADMIN — MELATONIN TAB 3 MG 3 MG: 3 TAB at 21:31

## 2024-07-09 RX ADMIN — NICOTINE POLACRILEX 2 MG: 2 GUM, CHEWING ORAL at 21:06

## 2024-07-09 RX ADMIN — LORATADINE 10 MG: 10 TABLET ORAL at 08:44

## 2024-07-09 RX ADMIN — LIDOCAINE HYDROCHLORIDE 15 ML: 20 SOLUTION ORAL at 08:56

## 2024-07-09 RX ADMIN — METFORMIN HYDROCHLORIDE 500 MG: 500 TABLET, FILM COATED ORAL at 08:44

## 2024-07-09 RX ADMIN — NICOTINE POLACRILEX 2 MG: 2 GUM, CHEWING ORAL at 17:15

## 2024-07-09 RX ADMIN — POLYETHYLENE GLYCOL 3350 17 G: 17 POWDER, FOR SOLUTION ORAL at 08:46

## 2024-07-09 RX ADMIN — NICOTINE POLACRILEX 2 MG: 2 GUM, CHEWING ORAL at 12:53

## 2024-07-09 RX ADMIN — ESCITALOPRAM OXALATE 20 MG: 10 TABLET, FILM COATED ORAL at 08:45

## 2024-07-09 RX ADMIN — NICOTINE POLACRILEX 2 MG: 2 GUM, CHEWING ORAL at 08:46

## 2024-07-09 RX ADMIN — GLYCOPYRROLATE 1 MG: 1 TABLET ORAL at 17:15

## 2024-07-09 RX ADMIN — METFORMIN HYDROCHLORIDE 500 MG: 500 TABLET, FILM COATED ORAL at 17:15

## 2024-07-09 RX ADMIN — FAMOTIDINE 20 MG: 20 TABLET ORAL at 08:45

## 2024-07-09 RX ADMIN — PANTOPRAZOLE SODIUM 20 MG: 20 TABLET, DELAYED RELEASE ORAL at 06:20

## 2024-07-09 RX ADMIN — LIDOCAINE HYDROCHLORIDE 15 ML: 20 SOLUTION ORAL at 14:13

## 2024-07-09 RX ADMIN — ARIPIPRAZOLE 15 MG: 15 TABLET ORAL at 08:45

## 2024-07-09 RX ADMIN — FLUTICASONE PROPIONATE 1 SPRAY: 50 SPRAY, METERED NASAL at 08:46

## 2024-07-09 RX ADMIN — GLYCOPYRROLATE 1 MG: 1 TABLET ORAL at 08:44

## 2024-07-09 RX ADMIN — CYANOCOBALAMIN TAB 1000 MCG 1000 MCG: 1000 TAB at 08:44

## 2024-07-09 NOTE — PROGRESS NOTES
07/09/24 0729   Team Meeting   Meeting Type Daily Rounds   Team Members Present   Team Members Present Physician;Nurse;;Other (Discipline and Name)   Patient/Family Present   Patient Present No   Patient's Family Present No     In attendance:  Dr. Jordan Holter, OHLLI Weiss, RN  Luisana Alvarado, [facility]  MATILDA Daniel.S.    Groups: 4/8    Pt more isolative yesterday and reported feeling tired. Pt is flat, denied SI; reports voices are the same.

## 2024-07-09 NOTE — NURSING NOTE
Alberto maintained on ongoing SAFE precaution without incident on this shift.  Awake, alert , visible, pleasant, withdrawn and cooperative upon approach.   Attended and participated in 4 out of 8 groups today.  Continues to be compliant with medication regimen and had snack.  Denies any depression or anxiety.  No delusion  or A/T/V hallucination noted.  Behavioral control

## 2024-07-09 NOTE — PLAN OF CARE
Problem: Alteration in Thoughts and Perception  Goal: Refrain from acting on delusional thinking/internal stimuli  Description: Interventions:  - Monitor patient closely, per order   - Utilize least restrictive measures   - Set reasonable limits, give positive feedback for acceptable   - Administer medications as ordered and monitor of potential side effects  Outcome: Progressing  Goal: Agree to be compliant with medication regime, as prescribed and report medication side effects  Description: Interventions:  - Offer appropriate PRN medication and supervise ingestion; conduct AIMS, as needed   Outcome: Progressing  Goal: Attend and participate in unit activities, including therapeutic, recreational, and educational groups  Description: Interventions:  -Encourage Visitation and family involvement in care  Outcome: Progressing     Problem: Ineffective Coping  Goal: Participates in unit activities  Description: Interventions:  - Provide therapeutic environment   - Provide required programming   - Redirect inappropriate behaviors   Outcome: Progressing     Problem: Depression  Goal: Verbalize thoughts and feelings  Description: Interventions:  - Assess and re-assess patient's level of risk   - Engage patient in 1:1 interactions, daily, for a minimum of 15 minutes   - Encourage patient to express feelings, fears, frustrations, hopes   Outcome: Progressing  Goal: Refrain from harming self  Description: Interventions:  - Monitor patient closely, per order   - Supervise medication ingestion, monitor effects and side effects   Outcome: Progressing  Goal: Refrain from isolation  Description: Interventions:  - Develop a trusting relationship   - Encourage socialization   Outcome: Progressing  Goal: Refrain from self-neglect  Outcome: Progressing     Problem: Anxiety  Goal: Anxiety is at manageable level  Description: Interventions:  - Assess and monitor patient's anxiety level.   - Monitor for signs and symptoms (heart  palpitations, chest pain, shortness of breath, headaches, nausea, feeling jumpy, restlessness, irritable, apprehensive).   - Collaborate with interdisciplinary team and initiate plan and interventions as ordered.  - [place] patient to unit/surroundings  - Explain treatment plan  - Encourage participation in care  - Encourage verbalization of concerns/fears  - Identify coping mechanisms  - Assist in developing anxiety-reducing skills  - Administer/offer alternative therapies  - Limit or eliminate stimulants  Outcome: Progressing

## 2024-07-09 NOTE — NURSING NOTE
Patient is pleasant, up early today and out for groups. Pt is medication compliant, reports no s/s and voices no concerns.

## 2024-07-09 NOTE — PROGRESS NOTES
Psychiatric Progress Note - Department of Behavioral Health   Alberto Berumen 28 y.o. male MRN: [id number]  Unit/Bed#: [facility] 108-02 Encounter: [id number]    ASSESSMENT & PLAN     Diagnoses:   Principal Problem:    Schizoaffective disorder, bipolar type (HCC)  Active Problems:    GERD (gastroesophageal reflux disease)    Medical clearance for psychiatric admission    Tobacco abuse    T wave inversion in EKG    Chronic idiopathic constipation    Confluent and reticulate papillomatosis    Primary hypertension    Elevated hemoglobin A1c    Bilateral lower extremity edema      Treatment Recommendations/Precautions:  Continue to promote patient participation in therapeutic milieu.  Continue medical management per medicine.  Continue previously prescribed psychotropic medication regimen; see below.  Continue behavioral health checks q.7 minutes.   Continue vitals per behavioral health unit protocol.  Discharge date per primary team; 201 commitment status.    SUBJECTIVE     Patient evaluated this a.m. for continuity of care. Patient was discussed at length with nursing and treatment team. Per nursing, patient remains calm, cooperative, intermittently interactive in the milieu, adherent to his medications without any acute adverse effects. No acute distress is noted throughout evaluation. Alberto Berumen denies suicidal/homicidal ideation in addition to thoughts of self-injury, receptive to crisis planning provided by this writer, contacting for safety in the inpatient setting, admitting to an ability to appropriately confide in staff including this writer.  Patient appears somewhat superficial on approach, denying any/all psychiatric complaints/concerns, although admits to intermittent instances of auditory hallucinations that are negative and derogatory in content after further evaluation by this writer, somewhat perseverative on discharge disposition, appearing receptive to psychoeducation and supportive therapy  provided by this writer.    PSYCHIATRIC REVIEW OF SYSTEMS     Behavior over the last 24 hours:  unchanged  Sleep: slightly diminished  Appetite: adequate  Medication side effects: No    REVIEW OF SYSTEMS   Review of systems: no complaints    OBJECTIVE     Vital Signs in Past 24 Hours:  Temp:  [97.6 °F (36.4 °C)-97.8 °F (36.6 °C)] 97.6 °F (36.4 °C)  HR:  [] 94  Resp:  [18] 18  BP: (132-140)/(62-72) 132/62    Intake/Output in Past 24 hours:  No intake/output data recorded.  No intake/output data recorded.        Laboratory Results:  I have personally reviewed all pertinent laboratory/tests results.  Most Recent Labs:   Lab Results   Component Value Date    WBC 9.74 07/04/2024    RBC 5.53 07/04/2024    HGB 13.2 07/04/2024    HCT 42.3 07/04/2024     07/04/2024    RDW 14.3 07/04/2024    NEUTROABS 5.20 07/04/2024    SODIUM 136 06/03/2024    K 3.8 06/03/2024    CL 96 06/03/2024    CO2 29 06/03/2024    BUN 10 06/03/2024    CREATININE 1.00 06/03/2024    GLUC 97 06/03/2024    GLUF 103 (H) 05/29/2024    CALCIUM 10.1 06/03/2024    AST 33 06/03/2024    ALT 72 (H) 06/03/2024    ALKPHOS 80 06/03/2024    TP 8.5 (H) 06/03/2024    ALB 4.7 06/03/2024    TBILI 0.30 06/03/2024    CHOLESTEROL 156 03/14/2024    HDL 44 03/14/2024    TRIG 133 03/14/2024    LDLCALC 85 03/14/2024    NONHDLC 112 03/14/2024    LITHIUM 0.61 01/09/2024    [id number] 1.062 11/15/2023    HGBA1C 5.0 04/01/2024    EAG 97 04/01/2024       Behavioral Health Medications: all current active meds have been reviewed, continue current psychiatric medications, and current meds:   Current Facility-Administered Medications   Medication Dose Route Frequency    acetaminophen (TYLENOL) tablet 650 mg  650 mg Oral Q4H PRN    acetaminophen (TYLENOL) tablet 650 mg  650 mg Oral Q6H PRN    aluminum-magnesium hydroxide-simethicone (MAALOX) oral suspension 30 mL  30 mL Oral Q4H PRN    amLODIPine (NORVASC) tablet 5 mg  5 mg Oral Daily    ARIPiprazole (ABILIFY) tablet 15 mg   15 mg Oral Daily    Artificial Tears ophthalmic solution 1 drop  1 drop Both Eyes Q3H PRN    atropine (ISOPTO ATROPINE) 1 % ophthalmic solution 1 drop  1 drop Sublingual Daily PRN    haloperidol lactate (HALDOL) injection 2.5 mg  2.5 mg Intramuscular Q4H PRN Max 4/day    And    LORazepam (ATIVAN) injection 1 mg  1 mg Intramuscular Q4H PRN Max 4/day    And    benztropine (COGENTIN) injection 0.5 mg  0.5 mg Intramuscular Q4H PRN Max 4/day    benztropine (COGENTIN) injection 1 mg  1 mg Intramuscular Q4H PRN Max 6/day    haloperidol lactate (HALDOL) injection 5 mg  5 mg Intramuscular Q4H PRN Max 4/day    And    LORazepam (ATIVAN) injection 2 mg  2 mg Intramuscular Q4H PRN Max 4/day    And    benztropine (COGENTIN) injection 1 mg  1 mg Intramuscular Q4H PRN Max 4/day    benztropine (COGENTIN) tablet 1 mg  1 mg Oral Q4H PRN Max 6/day    benztropine (COGENTIN) tablet 1 mg  1 mg Oral Q4H PRN Max 6/day    bisacodyl (DULCOLAX) rectal suppository 10 mg  10 mg Rectal Daily PRN    calcium carbonate (TUMS) chewable tablet 500 mg  500 mg Oral BID PRN    cloZAPine (CLOZARIL) tablet 500 mg  500 mg Oral HS    cyanocobalamin (VITAMIN B-12) tablet 1,000 mcg  1,000 mcg Oral Daily    hydrOXYzine HCL (ATARAX) tablet 50 mg  50 mg Oral Q6H PRN Max 4/day    Or    diphenhydrAMINE (BENADRYL) injection 50 mg  50 mg Intramuscular Q6H PRN    hydrOXYzine HCL (ATARAX) tablet 50 mg  50 mg Oral Q6H PRN Max 4/day    Or    diphenhydrAMINE (BENADRYL) injection 50 mg  50 mg Intramuscular Q6H PRN    diphenhydrAMINE-zinc acetate (BENADRYL) 2-0.1 % cream   Topical BID PRN    escitalopram (LEXAPRO) tablet 20 mg  20 mg Oral Daily    famotidine (PEPCID) tablet 20 mg  20 mg Oral BID    fluticasone (FLONASE) 50 mcg/act nasal spray 1 spray  1 spray Each Nare Daily    glycopyrrolate (ROBINUL) tablet 1 mg  1 mg Oral TID    haloperidol (HALDOL) tablet 1 mg  1 mg Oral Q6H PRN    haloperidol (HALDOL) tablet 2.5 mg  2.5 mg Oral Q4H PRN Max 4/day    haloperidol (HALDOL)  tablet 5 mg  5 mg Oral Q4H PRN Max 4/day    hydroCHLOROthiazide tablet 12.5 mg  12.5 mg Oral Daily    hydrocortisone 1 % ointment   Topical 4x Daily PRN    hydrOXYzine HCL (ATARAX) tablet 100 mg  100 mg Oral Q6H PRN Max 4/day    Or    LORazepam (ATIVAN) injection 2 mg  2 mg Intramuscular Q6H PRN    hydrOXYzine HCL (ATARAX) tablet 100 mg  100 mg Oral Q6H PRN Max 4/day    Or    LORazepam (ATIVAN) injection 2 mg  2 mg Intramuscular Q6H PRN    hydrOXYzine HCL (ATARAX) tablet 25 mg  25 mg Oral Q6H PRN Max 4/day    ibuprofen (MOTRIN) tablet 600 mg  600 mg Oral Q8H PRN    lactated ringers infusion  75 mL/hr Intravenous Continuous    lactated ringers infusion  50 mL/hr Intravenous Continuous    lactated ringers infusion  50 mL/hr Intravenous Continuous    Lidocaine Viscous HCl (XYLOCAINE) 2 % mucosal solution 15 mL  15 mL Swish & Spit 4x Daily PRN    loratadine (CLARITIN) tablet 10 mg  10 mg Oral Daily    melatonin tablet 3 mg  3 mg Oral HS    metFORMIN (GLUCOPHAGE) tablet 500 mg  500 mg Oral BID With Meals    methocarbamol (ROBAXIN) tablet 500 mg  500 mg Oral Q6H PRN    nicotine polacrilex (NICORETTE) gum 2 mg  2 mg Oral Q4H PRN    OLANZapine (ZyPREXA) tablet 5 mg  5 mg Oral Q4H PRN Max 3/day    Or    OLANZapine (ZyPREXA) IM injection 2.5 mg  2.5 mg Intramuscular Q4H PRN Max 3/day    OLANZapine (ZyPREXA) tablet 5 mg  5 mg Oral Q3H PRN Max 3/day    Or    OLANZapine (ZyPREXA) IM injection 5 mg  5 mg Intramuscular Q3H PRN Max 3/day    OLANZapine (ZyPREXA) tablet 2.5 mg  2.5 mg Oral Q4H PRN Max 6/day    ondansetron (ZOFRAN-ODT) dispersible tablet 4 mg  4 mg Oral Q6H PRN    pantoprazole (PROTONIX) EC tablet 20 mg  20 mg Oral Early Morning    polyethylene glycol (MIRALAX) packet 17 g  17 g Oral Daily    polyethylene glycol (MIRALAX) packet 17 g  17 g Oral Daily PRN    propranolol (INDERAL) tablet 10 mg  10 mg Oral Q12H [facility]    senna-docusate sodium (SENOKOT S) 8.6-50 mg per tablet 1 tablet  1 tablet Oral Daily PRN     senna-docusate sodium (SENOKOT S) 8.6-50 mg per tablet 2 tablet  2 tablet Oral BID    traZODone (DESYREL) tablet 50 mg  50 mg Oral HS PRN    white petrolatum-mineral oil (EUCERIN,HYDROCERIN) cream   Topical TID PRN   .    Risks, benefits and possible side effects of Medications:   Risks, benefits, and possible side effects of medications explained to patient and patient verbalizes understanding.      Mental Status Evaluation:  Appearance:  casually dressed, disheveled, and older than stated age   Behavior:  Calm and cooperative although somewhat superficial, suspicious   Speech:  soft and scant   Mood:  euthymic   Affect:  blunted and mood-incongruent   Language sparse   Thought Process:  perserverative possessing possible thought blocking   Thought Content:  delusions  persecutory, paranoid   Perceptual Disturbances: Auditory hallucinations without commands appearing internally preoccupied   Risk Potential: Suicidal Ideations none, Homicidal Ideations none, and Potential for Aggression No   Sensorium:  person, place, and time/date   Cognition:  recent and remote memory grossly intact   Consciousness:  alert and awake    Attention: attention span appeared shorter than expected for age   Insight:  limited   Judgment: limited   Intellect Not assessed   Gait/Station: normal gait/station and normal balance   Motor Activity: no abnormal movements     Memory: Short and long term memory  fair     Progress Toward Goals: unchanged, as evidenced by their participation (or lack thereof) in individual, social and therapeutic milieu in addition to adherence to their medication regimen.    Recommended Treatment:   See above for assessment and plan.    Inpatient Psychiatric Certification: Based upon physical, mental and social evaluations, I certify that inpatient psychiatric services are medically necessary for this patient for a duration of greater than 2 midnights for the treatment of Schizoaffective disorder, bipolar type  (HCC) including psychotropic medication management, participation in the therapeutic milieu and referrals as indicated. Available alternative community resources do not meet the patient's mental health care needs. I further attest that an established written individualized plan of care has been implemented and is outlined in the patient's medical records.    Counseling/Coordination of Care    Total unit time spent today was greater than 15 minutes. Greater than 50% of total time was spent with the patient and/or patient's relatives and/or coordination of patient's care.     Patient's rights, confidentiality, exceptions to confidentiality, use of electronic medical record including appropriate staff access to medical record regarding behavioral health services and consent to treatment were reviewed.    Note Share:     This note was not shared with the patient due to reasonable likelihood of causing patient harm     This note has been constructed using a voice recognition system.    There may be translation, syntax, or grammatical errors. If you have any questions, please contact the dictating provider.    Jordan Christopher Holter, DO 07/09/24

## 2024-07-10 PROCEDURE — 99232 SBSQ HOSP IP/OBS MODERATE 35: CPT | Performed by: PSYCHIATRY & NEUROLOGY

## 2024-07-10 RX ADMIN — HYDROCHLOROTHIAZIDE 12.5 MG: 12.5 TABLET ORAL at 08:03

## 2024-07-10 RX ADMIN — CYANOCOBALAMIN TAB 1000 MCG 1000 MCG: 1000 TAB at 08:03

## 2024-07-10 RX ADMIN — PROPRANOLOL HYDROCHLORIDE 10 MG: 10 TABLET ORAL at 21:18

## 2024-07-10 RX ADMIN — CLOZAPINE 500 MG: 100 TABLET ORAL at 21:18

## 2024-07-10 RX ADMIN — PROPRANOLOL HYDROCHLORIDE 10 MG: 10 TABLET ORAL at 08:03

## 2024-07-10 RX ADMIN — FAMOTIDINE 20 MG: 20 TABLET ORAL at 17:13

## 2024-07-10 RX ADMIN — LIDOCAINE HYDROCHLORIDE 15 ML: 20 SOLUTION ORAL at 09:04

## 2024-07-10 RX ADMIN — GLYCOPYRROLATE 1 MG: 1 TABLET ORAL at 08:03

## 2024-07-10 RX ADMIN — GLYCOPYRROLATE 1 MG: 1 TABLET ORAL at 21:18

## 2024-07-10 RX ADMIN — GLYCOPYRROLATE 1 MG: 1 TABLET ORAL at 17:13

## 2024-07-10 RX ADMIN — AMLODIPINE BESYLATE 5 MG: 5 TABLET ORAL at 08:03

## 2024-07-10 RX ADMIN — NICOTINE POLACRILEX 2 MG: 2 GUM, CHEWING ORAL at 21:18

## 2024-07-10 RX ADMIN — SENNOSIDES AND DOCUSATE SODIUM 2 TABLET: 8.6; 5 TABLET ORAL at 17:13

## 2024-07-10 RX ADMIN — NICOTINE POLACRILEX 2 MG: 2 GUM, CHEWING ORAL at 17:13

## 2024-07-10 RX ADMIN — FAMOTIDINE 20 MG: 20 TABLET ORAL at 08:03

## 2024-07-10 RX ADMIN — METFORMIN HYDROCHLORIDE 500 MG: 500 TABLET, FILM COATED ORAL at 17:13

## 2024-07-10 RX ADMIN — ESCITALOPRAM OXALATE 20 MG: 10 TABLET, FILM COATED ORAL at 08:03

## 2024-07-10 RX ADMIN — MELATONIN TAB 3 MG 3 MG: 3 TAB at 21:18

## 2024-07-10 RX ADMIN — LIDOCAINE HYDROCHLORIDE 15 ML: 20 SOLUTION ORAL at 14:31

## 2024-07-10 RX ADMIN — LIDOCAINE HYDROCHLORIDE 15 ML: 20 SOLUTION ORAL at 18:15

## 2024-07-10 RX ADMIN — POLYETHYLENE GLYCOL 3350 17 G: 17 POWDER, FOR SOLUTION ORAL at 08:05

## 2024-07-10 RX ADMIN — PANTOPRAZOLE SODIUM 20 MG: 20 TABLET, DELAYED RELEASE ORAL at 06:04

## 2024-07-10 RX ADMIN — SENNOSIDES AND DOCUSATE SODIUM 2 TABLET: 8.6; 5 TABLET ORAL at 08:08

## 2024-07-10 RX ADMIN — ARIPIPRAZOLE 15 MG: 15 TABLET ORAL at 08:07

## 2024-07-10 RX ADMIN — NICOTINE POLACRILEX 2 MG: 2 GUM, CHEWING ORAL at 08:03

## 2024-07-10 RX ADMIN — LORATADINE 10 MG: 10 TABLET ORAL at 08:03

## 2024-07-10 RX ADMIN — NICOTINE POLACRILEX 2 MG: 2 GUM, CHEWING ORAL at 13:07

## 2024-07-10 RX ADMIN — FLUTICASONE PROPIONATE 1 SPRAY: 50 SPRAY, METERED NASAL at 08:07

## 2024-07-10 RX ADMIN — METFORMIN HYDROCHLORIDE 500 MG: 500 TABLET, FILM COATED ORAL at 08:03

## 2024-07-10 NOTE — PROGRESS NOTES
07/10/24 0738   Team Meeting   Meeting Type Daily Rounds   Team Members Present   Team Members Present Physician;Nurse;;Other (Discipline and Name)   Patient/Family Present   Patient Present No   Patient's Family Present No     In attendance:  Dr. Alex Thomas, MD Dr. Jordan Holter, DO Eveline Hunt, HOLLI Taylor, RN  Luisana Alvarado, [facility]  Carla Lux, [facility]  MATILDA Daniel.S.    Groups: 6/9    Pt visible but less social overall. Pt irritable with female peer. Pt due for labs tomorrow. No bx issues noted; reports ongoing AH.

## 2024-07-10 NOTE — PLAN OF CARE
Problem: Alteration in Thoughts and Perception  Goal: Treatment Goal: Gain control of psychotic behaviors/thinking, reduce/eliminate presenting symptoms and demonstrate improved reality functioning upon discharge  Outcome: Progressing  Goal: Verbalize thoughts and feelings  Description: Interventions:  - Promote a nonjudgmental and trusting relationship with the patient through active listening and therapeutic communication  - Assess patient's level of functioning, behavior and potential for risk  - Engage patient in 1 on 1 interactions  - Encourage patient to express fears, feelings, frustrations, and discuss symptoms    - [place] patient to reality, help patient recognize reality-based thinking   - Administer medications as ordered and assess for potential side effects  - Provide the patient education related to the signs and symptoms of the illness and desired effects of prescribed medications  Outcome: Progressing  Goal: Refrain from acting on delusional thinking/internal stimuli  Description: Interventions:  - Monitor patient closely, per order   - Utilize least restrictive measures   - Set reasonable limits, give positive feedback for acceptable   - Administer medications as ordered and monitor of potential side effects  Outcome: Progressing  Goal: Agree to be compliant with medication regime, as prescribed and report medication side effects  Description: Interventions:  - Offer appropriate PRN medication and supervise ingestion; conduct AIMS, as needed   Outcome: Progressing  Goal: Attend and participate in unit activities, including therapeutic, recreational, and educational groups  Description: Interventions:  -Encourage Visitation and family involvement in care  Outcome: Progressing  Goal: Complete daily ADLs, including personal hygiene independently, as able  Description: Interventions:  - Observe, teach, and assist patient with ADLS  - Monitor and promote a balance of rest/activity, with adequate  nutrition and elimination   Outcome: Progressing     Problem: Ineffective Coping  Goal: Demonstrates healthy coping skills  Outcome: Progressing  Goal: Participates in unit activities  Description: Interventions:  - Provide therapeutic environment   - Provide required programming   - Redirect inappropriate behaviors   Outcome: Progressing     Problem: Depression  Goal: Treatment Goal: Demonstrate behavioral control of depressive symptoms, verbalize feelings of improved mood/affect, and adopt new coping skills prior to discharge  Outcome: Progressing  Goal: Verbalize thoughts and feelings  Description: Interventions:  - Assess and re-assess patient's level of risk   - Engage patient in 1:1 interactions, daily, for a minimum of 15 minutes   - Encourage patient to express feelings, fears, frustrations, hopes   Outcome: Progressing  Goal: Refrain from harming self  Description: Interventions:  - Monitor patient closely, per order   - Supervise medication ingestion, monitor effects and side effects   Outcome: Progressing  Goal: Refrain from isolation  Description: Interventions:  - Develop a trusting relationship   - Encourage socialization   Outcome: Progressing  Goal: Refrain from self-neglect  Outcome: Progressing     Problem: Anxiety  Goal: Anxiety is at manageable level  Description: Interventions:  - Assess and monitor patient's anxiety level.   - Monitor for signs and symptoms (heart palpitations, chest pain, shortness of breath, headaches, nausea, feeling jumpy, restlessness, irritable, apprehensive).   - Collaborate with interdisciplinary team and initiate plan and interventions as ordered.  - [place] patient to unit/surroundings  - Explain treatment plan  - Encourage participation in care  - Encourage verbalization of concerns/fears  - Identify coping mechanisms  - Assist in developing anxiety-reducing skills  - Administer/offer alternative therapies  - Limit or eliminate stimulants  Outcome: Progressing      Problem: Alteration in Orientation  Goal: Interact with staff daily  Description: Interventions:  - Assess and re-assess patient's level of orientation  - Engage patient in 1 on 1 interactions, daily, for a minimum of 15 minutes   - Establish rapport/trust with patient   Outcome: Progressing  Goal: Cooperate with recommended testing/procedures  Description: Interventions:  - Determine need for ancillary testing  - Observe for mental status changes  - Implement falls/precaution protocol   Outcome: Progressing  Goal: Complete daily ADLs, including personal hygiene independently, as able  Description: Interventions:  - Observe, teach, and assist patient with ADLS  Outcome: Progressing     Problem: DISCHARGE PLANNING - CARE MANAGEMENT  Goal: Discharge to post-acute care or home with appropriate resources  Description: INTERVENTIONS:  - Conduct assessment to determine patient/family and health care team treatment goals, and need for post-acute services based on payer coverage, community resources, and patient preferences, and barriers to discharge  - Address psychosocial, clinical, and financial barriers to discharge as identified in assessment in conjunction with the patient/family and health care team  - Arrange appropriate level of post-acute services according to patient’s   needs and preference and payer coverage in collaboration with the physician and health care team  - Communicate with and update the patient/family, physician, and health care team regarding progress on the discharge plan  - Arrange appropriate transportation to post-acute venues  Outcome: Progressing

## 2024-07-10 NOTE — SOCIAL WORK
SW checked in with pt. Pt reports that he gave SW direct number to his aunt and she said she would call; SW will monitor for call.   SW inquired about pt's current state, pt reported he's tired but feeling ok overall and denied any concerns or issues. Pt declined to sit down for a longer session, expressing that he's feeling fine and wants to attend group.

## 2024-07-10 NOTE — NURSING NOTE
Received pt in bed at change of shift with eyes closed; chest movement noted.  Continues the same thus this far as per q 7 min room checks.   Will continue to monitor behavior, sleeping pattern and any medical issues that may arise.    0611:  Sleeping 7+ hrs thus this far

## 2024-07-10 NOTE — NURSING NOTE
"Pt is present on the milieu and social with select peers. He consumed 100 % of dinner. Took his medications without incidence. PRN lidocaine viscous swish given at 2012. Nicorette gum given at 1715 and 2106. Pt endorses \"the same\" AH. Pt has flat affect, but is otherwise polite and cooperative. No behavioral issues.   "

## 2024-07-10 NOTE — NURSING NOTE
"Patient visible but withdrawn. Patient has flat affect and poor eye contact but is pleasant and cooperative. Patient endorses AH but ignores voices. Patient states he feels \"the same\". PRN Lidocaine Viscous given @ 1431. Patient attended 0 groups. Appetite intact 100/100. Patient med compliant. VSS. Continual safety checks maintained  "

## 2024-07-10 NOTE — SOCIAL WORK
"BRANDY checked in with pt. Pt reports that his aunt has not been answering SW calls because they show up as \"unavailable\" on her caller ID. SW has been leaving voicemails with call back number. BRANDY direct number provided to pt to give to his aunt in order to coordinate in person visit for pt.   "

## 2024-07-10 NOTE — PLAN OF CARE
Problem: Ineffective Coping  Goal: Identifies ineffective coping skills  Outcome: Not Progressing  Goal: Identifies healthy coping skills  Outcome: Progressing  Goal: Demonstrates healthy coping skills  Outcome: Not Progressing  Goal: Participates in unit activities  Description: Interventions:  - Provide therapeutic environment   - Provide required programming   - Redirect inappropriate behaviors   Outcome: Progressing    Attended 10/17 of the groups offered in the last 2 days.

## 2024-07-10 NOTE — PROGRESS NOTES
Psychiatry Progress Note [facility]    Alberto Berumen 28 y.o. male MRN: [id number]  Unit/Bed#: -02 Encounter: [id number]  Code Status: Level 1 - Full Code    PCP: Joce Juan MD    Date of Admission:  3/29/2024 2008   Date of Service:  07/10/24    Patient Active Problem List   Diagnosis    GERD (gastroesophageal reflux disease)    Medical clearance for psychiatric admission    Schizoaffective disorder, bipolar type (HCC)    Tobacco abuse    T wave inversion in EKG    Syringoma    Chronic idiopathic constipation    Vitamin B 12 deficiency    Vitamin D deficiency    Confluent and reticulate papillomatosis    Class 2 obesity in adult    Primary hypertension    Elevated hemoglobin A1c    Bilateral lower extremity edema     Review of systems: continues to use Xylocaine swish in his mouth for allegedly biting his tongue during ECT but otherwise unremarkable   psychiatric diagnosis: Schizoaffective bipolar type  assessment  Overall Status: Status quo with no significant changes  certification Statement: The patient will continue to require additional inpatient hospital stay due to ongoing voices that are threatening to kill him and his family lack of response to medications and ECT so far.     Medications: Clozapine 500 mg at bedtime, propranolol 10 mg every 12 hours as as needed for anxiety, Abilify 15 mg mg at bedtime Lexapro 20 mg once a day, atropine eyedrops sublingual for drooling of saliva and senna 2 tablets twice a day for constipation  All medications reviewed and recommend they be continued for symptom management  side effects from treatment: None reported  Medication changes   None today  Medication education   Risks side effects benefits and precautions of medications discussed with patient and he did verbalize an understanding about risks for metabolic syndrome from being on neuroleptics and is form tardive dyskinesia etc. and special precautions about being on clozapine    Understanding of medications: Has some understanding   Justification for dual anti-psychotics: Not applicable    Non-pharmacological treatments  Continue with individual, group, milieu and occupational therapy using recovery principles and psycho-education about accepting illness and the need for treatment.   to contact aunt and explore ACT team referral which he never had  ECT to be continued  q 2 weekly for maintenance x 6  Refuses to see a dietician and agrees to do more exercises as he is about 100 lbs overweight   Prolactin level high so Risperdal replaced with Abilify    Safety  Safety and communication plan established to target dynamic risk factors discussed above.    Discharge Plan   Back to his aunt with an ACT team    Interval Progress   Continues to report hearing voices threatening to kill him and his family which are not commanding at all but making him feel depressed and now passive death wishes reported  Treatment and is no longer suicidal and able to contract for safety.  Continues to feel frustrated the voices are not gone completely but continues to insist ECTs have definitely helped him to stay out of his head.  No behavioral PRNs needed lately and not aggressive or agitated or threatening but remains evasive guarded preoccupied and is looking forward to continuing with maintenance ECTs every 2 weeks.  Appears with poor hygiene and grooming staying to himself with a constricted to flat affect, more visible but not as social as before  I Acceptance by patient: Accepting  Hopefulness in recovery: Living with his sister and aunt  Involved in reintegration process: Talking with sister and aunt  Trusting in relationship with psychiatrist: Trusting  Sleep: Good  Appetite: Good  Compliance with Medications: Good  Group attendance: Attending some groups 6/9  Significant events: Status quo with no significant changes    mental Status Exam  Appearance: age appropriate, improved grooming, looks  older than stated age, overweight, with hair in dreadlocks casually dressed with a clean shaven face, fairly groomed with good eye contact found sitting in the library area casually dressed in hospital clothing  behavior: cooperative, mildly anxious, evasive, gesturing, slow responses.   speech: normal rate, normal volume, normal pitch  Mood: dysphoric, anxious, continues to report feeling good with the ECT   affect: constricted, inappropriate, mood-congruent sad and anxious with constricted to flat affect   thought Process: organized, logical, coherent, goal directed, linear, decreased rate of thoughts, slowing of thoughts, negative thinking, impaired abstract reasoning, concrete  Thought Content: paranoid ideation, some paranoia, grandiose ideas, intrusive thoughts, preoccupied, chronic, continues to report paranoia about people threatening to kill him and his family because of the voices.  He believes ECT has helped so that he is not much in his head.  No other delusions elicited.  No current suicidal or homicidal thoughts and no plans verbalized but today reports having passive death wishes because of voices with no plans and able to CFS and it has not changed yet  .  No phobias obsessions compulsions or distorted body perceptions reported.  Preoccupied with wanting to get ECTs more often despite reminding him that it may impair his memory and finally he agreed to such as it is to every 2 weeks  Perceptual Disturbances: Continues to report same voices threatening to kill him and his family but not commanding  Hx Risk Factors: chronic psychiatric problems, chronic anxiety symptoms, chronic psychotic symptoms,    Sensorium: Oriented x 3 spheres and situation  Cognition: recent and remote memory grossly intact  Consciousness: alert and awake  Attention: attention span and concentration are age appropriate  Intellect: appears to be of average intelligence  Insight: intact  Judgement: intact  Motor Activity: no  abnormal movements     Vitals  Temp:  [97.6 °F (36.4 °C)-98.2 °F (36.8 °C)] 98.2 °F (36.8 °C)  HR:  [] 104  Resp:  [18] 18  BP: (132-163)/(62-96) 163/96  SpO2:  [96 %] 96 %  No intake or output data in the 24 hours ending 07/10/24 0521              Lab Results: All Labs For Current Hospital Admission Reviewed     Current Facility-Administered Medications   Medication Dose Route Frequency Provider Last Rate    acetaminophen  650 mg Oral Q4H PRN Jordan C Holter,       acetaminophen  650 mg Oral Q6H PRN HOLLI Lion      aluminum-magnesium hydroxide-simethicone  30 mL Oral Q4H PRN Jordan C Holter,       amLODIPine  5 mg Oral Daily STEVE LionNP      ARIPiprazole  15 mg Oral Daily Bora Rosario MD      Artificial Tears  1 drop Both Eyes Q3H PRN Jordan C Holter, DO      atropine  1 drop Sublingual Daily PRN HOLLI Lion      haloperidol lactate  2.5 mg Intramuscular Q4H PRN Max 4/day STEVE LionNP      And    LORazepam  1 mg Intramuscular Q4H PRN Max 4/day STEVE LionNP      And    benztropine  0.5 mg Intramuscular Q4H PRN Max 4/day HOLLI Lion      benztropine  1 mg Intramuscular Q4H PRN Max 6/day Jordan C Holter,       haloperidol lactate  5 mg Intramuscular Q4H PRN Max 4/day STEVE LionNP      And    LORazepam  2 mg Intramuscular Q4H PRN Max 4/day HOLLI Lion      And    benztropine  1 mg Intramuscular Q4H PRN Max 4/day HOLLI Lion      benztropine  1 mg Oral Q4H PRN Max 6/day HOLLI Lion      benztropine  1 mg Oral Q4H PRN Max 6/day Jordan C Holter,       bisacodyl  10 mg Rectal Daily PRN HOLLI Lion      calcium carbonate  500 mg Oral BID PRN Eileen Jensen, STEVENP      cloZAPine  500 mg Oral HS Bora Rosario MD      cyanocobalamin  1,000 mcg Oral Daily HOLLI Galvan      hydrOXYzine HCL  50 mg Oral Q6H PRN Max 4/day Jordan C Holter, DO      Or    diphenhydrAMINE  50 mg Intramuscular Q6H PRN Jordan C Holter, DO       hydrOXYzine HCL  50 mg Oral Q6H PRN Max 4/day HOLLI Lion      Or    diphenhydrAMINE  50 mg Intramuscular Q6H PRN HOLLI Lion      diphenhydrAMINE-zinc acetate   Topical BID PRN HOLLI Lion      escitalopram  20 mg Oral Daily HOLLI Lion      famotidine  20 mg Oral BID Eileen Holliday HOLLI Jensen      fluticasone  1 spray Each Nare Daily Brina Guillen MD      glycopyrrolate  1 mg Oral TID Bora Rosario MD      haloperidol  1 mg Oral Q6H PRN HOLLI Lion      haloperidol  2.5 mg Oral Q4H PRN Max 4/day HOLLI Lion      haloperidol  5 mg Oral Q4H PRN Max 4/day HOLLI Lion      hydroCHLOROthiazide  12.5 mg Oral Daily HOLLI Galvan      hydrocortisone   Topical 4x Daily PRN HOLLI Lion      hydrOXYzine HCL  100 mg Oral Q6H PRN Max 4/day Jordan C Holter, DO      Or    LORazepam  2 mg Intramuscular Q6H PRN Ion Dupontter, DO      hydrOXYzine HCL  100 mg Oral Q6H PRN Max 4/day HOLLI Lion      Or    LORazepam  2 mg Intramuscular Q6H PRN HOLLI Lion      hydrOXYzine HCL  25 mg Oral Q6H PRN Max 4/day Ion FISCHER Holter, DO      ibuprofen  600 mg Oral Q8H PRN HOLLI Lion      lactated ringers  75 mL/hr Intravenous Continuous Anjel Arriaza CRNA Stopped (06/21/24 0729)    lactated ringers  50 mL/hr Intravenous Continuous Ramya Arana MD Stopped (06/21/24 0729)    lactated ringers  50 mL/hr Intravenous Continuous Ramya Arana MD Stopped (06/21/24 0729)    Lidocaine Viscous HCl  15 mL Swish & Spit 4x Daily PRN HOLLI Galvan      loratadine  10 mg Oral Daily Brina Guillen MD      melatonin  3 mg Oral HS Jordan C Holter, DO      metFORMIN  500 mg Oral BID With Meals HOLLI Galvan      methocarbamol  500 mg Oral Q6H PRN HOLLI Lion      nicotine polacrilex  2 mg Oral Q4H PRN Bora Rosario MD      OLANZapine  5 mg Oral Q4H PRN Max 3/day Jordan C Holter, DO      Or    OLANZapine  2.5 mg Intramuscular  Q4H PRN Max 3/day Ion C Holter, DO      OLANZapine  5 mg Oral Q3H PRN Max 3/day Ion C Holter, DO      Or    OLANZapine  5 mg Intramuscular Q3H PRN Max 3/day Ion C Holter, DO      OLANZapine  2.5 mg Oral Q4H PRN Max 6/day Ion C Holter, DO      ondansetron  4 mg Oral Q6H PRN HOLLI Lion      pantoprazole  20 mg Oral Early Morning Eileen Gonzalezmiryamjaylen, HOLLI      polyethylene glycol  17 g Oral Daily HOLLI Lion      polyethylene glycol  17 g Oral Daily PRN Ion Dupontter, DO      propranolol  10 mg Oral Q12H KAYLIE HOLLI Lion      senna-docusate sodium  1 tablet Oral Daily PRN Jordan C Holter, DO      senna-docusate sodium  2 tablet Oral BID HOLLI Lion      traZODone  50 mg Oral HS PRN HOLLI Lion      white petrolatum-mineral oil   Topical TID PRN HOLLI Lion         Counseling / Coordination of Care: Total floor / unit time spent today 15 minutes. Greater than 50% of total time was spent with the patient and / or family counseling and / or somewhat receptive to supportive listening and teaching positive coping skills to deal with symptom mangement.     Patient's Rights, confidentiality and exceptions to confidentiality, use of automated medical record, Behavioral Health Services staff access to medical record, and consent to treatment reviewed.    This note has been dictated and hence there may be problems with punctuation, spelling and formatting and if anyone has any concerns please address them to Dr. Rosario   This note is not shared with patient due to potential for making patient's condition worse by knowing the content of the note.

## 2024-07-11 LAB
BASOPHILS # BLD AUTO: 0.06 THOUSANDS/ÂΜL (ref 0–0.1)
BASOPHILS NFR BLD AUTO: 1 % (ref 0–1)
BNP SERPL-MCNC: 10 PG/ML (ref 0–100)
CK SERPL-CCNC: 291 U/L (ref 39–308)
CRP SERPL QL: 10.3 MG/L
EOSINOPHIL # BLD AUTO: 0.32 THOUSAND/ÂΜL (ref 0–0.61)
EOSINOPHIL NFR BLD AUTO: 3 % (ref 0–6)
ERYTHROCYTE [DISTWIDTH] IN BLOOD BY AUTOMATED COUNT: 14 % (ref 11.6–15.1)
HCT VFR BLD AUTO: 43.4 % (ref 36.5–49.3)
HGB BLD-MCNC: 13.1 G/DL (ref 12–17)
IMM GRANULOCYTES # BLD AUTO: 0.04 THOUSAND/UL (ref 0–0.2)
IMM GRANULOCYTES NFR BLD AUTO: 0 % (ref 0–2)
LYMPHOCYTES # BLD AUTO: 3.3 THOUSANDS/ÂΜL (ref 0.6–4.47)
LYMPHOCYTES NFR BLD AUTO: 29 % (ref 14–44)
MCH RBC QN AUTO: 24.1 PG (ref 26.8–34.3)
MCHC RBC AUTO-ENTMCNC: 30.2 G/DL (ref 31.4–37.4)
MCV RBC AUTO: 80 FL (ref 82–98)
MONOCYTES # BLD AUTO: 0.99 THOUSAND/ÂΜL (ref 0.17–1.22)
MONOCYTES NFR BLD AUTO: 9 % (ref 4–12)
NEUTROPHILS # BLD AUTO: 6.6 THOUSANDS/ÂΜL (ref 1.85–7.62)
NEUTS SEG NFR BLD AUTO: 58 % (ref 43–75)
NRBC BLD AUTO-RTO: 0 /100 WBCS
PLATELET # BLD AUTO: 261 THOUSANDS/UL (ref 149–390)
PMV BLD AUTO: 10.2 FL (ref 8.9–12.7)
RBC # BLD AUTO: 5.44 MILLION/UL (ref 3.88–5.62)
WBC # BLD AUTO: 11.31 THOUSAND/UL (ref 4.31–10.16)

## 2024-07-11 PROCEDURE — 99232 SBSQ HOSP IP/OBS MODERATE 35: CPT | Performed by: PSYCHIATRY & NEUROLOGY

## 2024-07-11 PROCEDURE — 85025 COMPLETE CBC W/AUTO DIFF WBC: CPT

## 2024-07-11 PROCEDURE — 86140 C-REACTIVE PROTEIN: CPT

## 2024-07-11 PROCEDURE — 82550 ASSAY OF CK (CPK): CPT

## 2024-07-11 PROCEDURE — 83880 ASSAY OF NATRIURETIC PEPTIDE: CPT

## 2024-07-11 RX ORDER — LIDOCAINE HYDROCHLORIDE 20 MG/ML
15 SOLUTION OROPHARYNGEAL 4 TIMES DAILY PRN
Status: DISPENSED | OUTPATIENT
Start: 2024-07-11 | End: 2024-07-21

## 2024-07-11 RX ADMIN — FAMOTIDINE 20 MG: 20 TABLET ORAL at 08:30

## 2024-07-11 RX ADMIN — HYDROCHLOROTHIAZIDE 12.5 MG: 12.5 TABLET ORAL at 08:30

## 2024-07-11 RX ADMIN — METFORMIN HYDROCHLORIDE 500 MG: 500 TABLET, FILM COATED ORAL at 17:21

## 2024-07-11 RX ADMIN — LIDOCAINE HYDROCHLORIDE 15 ML: 20 SOLUTION ORAL at 21:21

## 2024-07-11 RX ADMIN — CLOZAPINE 500 MG: 100 TABLET ORAL at 21:15

## 2024-07-11 RX ADMIN — NICOTINE POLACRILEX 2 MG: 2 GUM, CHEWING ORAL at 17:20

## 2024-07-11 RX ADMIN — ARIPIPRAZOLE 15 MG: 15 TABLET ORAL at 08:30

## 2024-07-11 RX ADMIN — NICOTINE POLACRILEX 2 MG: 2 GUM, CHEWING ORAL at 21:34

## 2024-07-11 RX ADMIN — MELATONIN TAB 3 MG 3 MG: 3 TAB at 21:15

## 2024-07-11 RX ADMIN — ESCITALOPRAM OXALATE 20 MG: 10 TABLET, FILM COATED ORAL at 08:30

## 2024-07-11 RX ADMIN — CYANOCOBALAMIN TAB 1000 MCG 1000 MCG: 1000 TAB at 08:30

## 2024-07-11 RX ADMIN — GLYCOPYRROLATE 1 MG: 1 TABLET ORAL at 08:30

## 2024-07-11 RX ADMIN — SENNOSIDES AND DOCUSATE SODIUM 2 TABLET: 8.6; 5 TABLET ORAL at 17:20

## 2024-07-11 RX ADMIN — PROPRANOLOL HYDROCHLORIDE 10 MG: 10 TABLET ORAL at 21:15

## 2024-07-11 RX ADMIN — SENNOSIDES AND DOCUSATE SODIUM 2 TABLET: 8.6; 5 TABLET ORAL at 08:30

## 2024-07-11 RX ADMIN — LORATADINE 10 MG: 10 TABLET ORAL at 08:30

## 2024-07-11 RX ADMIN — METFORMIN HYDROCHLORIDE 500 MG: 500 TABLET, FILM COATED ORAL at 08:30

## 2024-07-11 RX ADMIN — AMLODIPINE BESYLATE 5 MG: 5 TABLET ORAL at 08:30

## 2024-07-11 RX ADMIN — PROPRANOLOL HYDROCHLORIDE 10 MG: 10 TABLET ORAL at 08:30

## 2024-07-11 RX ADMIN — POLYETHYLENE GLYCOL 3350 17 G: 17 POWDER, FOR SOLUTION ORAL at 08:32

## 2024-07-11 RX ADMIN — GLYCOPYRROLATE 1 MG: 1 TABLET ORAL at 21:15

## 2024-07-11 RX ADMIN — ACETAMINOPHEN 650 MG: 325 TABLET, FILM COATED ORAL at 15:08

## 2024-07-11 RX ADMIN — PANTOPRAZOLE SODIUM 20 MG: 20 TABLET, DELAYED RELEASE ORAL at 06:10

## 2024-07-11 RX ADMIN — NICOTINE POLACRILEX 2 MG: 2 GUM, CHEWING ORAL at 12:43

## 2024-07-11 RX ADMIN — FLUTICASONE PROPIONATE 1 SPRAY: 50 SPRAY, METERED NASAL at 08:35

## 2024-07-11 RX ADMIN — LIDOCAINE HYDROCHLORIDE 15 ML: 20 SOLUTION ORAL at 17:22

## 2024-07-11 RX ADMIN — FAMOTIDINE 20 MG: 20 TABLET ORAL at 17:20

## 2024-07-11 RX ADMIN — GLYCOPYRROLATE 1 MG: 1 TABLET ORAL at 17:20

## 2024-07-11 NOTE — NURSING NOTE
Pt requested PRN Tylenol for 6/10 throat pain. Pt reports a sore throat and received Tylenol 650 mg as ordered for moderate pain at 1508. Will monitor for effectiveness.

## 2024-07-11 NOTE — PLAN OF CARE
Problem: Alteration in Thoughts and Perception  Goal: Verbalize thoughts and feelings  Description: Interventions:  - Promote a nonjudgmental and trusting relationship with the patient through active listening and therapeutic communication  - Assess patient's level of functioning, behavior and potential for risk  - Engage patient in 1 on 1 interactions  - Encourage patient to express fears, feelings, frustrations, and discuss symptoms    - [place] patient to reality, help patient recognize reality-based thinking   - Administer medications as ordered and assess for potential side effects  - Provide the patient education related to the signs and symptoms of the illness and desired effects of prescribed medications  Outcome: Progressing  Goal: Agree to be compliant with medication regime, as prescribed and report medication side effects  Description: Interventions:  - Offer appropriate PRN medication and supervise ingestion; conduct AIMS, as needed   Outcome: Progressing  Goal: Attend and participate in unit activities, including therapeutic, recreational, and educational groups  Description: Interventions:  -Encourage Visitation and family involvement in care  Outcome: Progressing  Goal: Recognize dysfunctional thoughts, communicate reality-based thoughts at the time of discharge  Description: Interventions:  - Provide medication and psycho-education to assist patient in compliance and developing insight into his/her illness   Outcome: Progressing     Problem: Ineffective Coping  Goal: Demonstrates healthy coping skills  Outcome: Progressing  Goal: Participates in unit activities  Description: Interventions:  - Provide therapeutic environment   - Provide required programming   - Redirect inappropriate behaviors   Outcome: Progressing     Problem: Depression  Goal: Refrain from harming self  Description: Interventions:  - Monitor patient closely, per order   - Supervise medication ingestion, monitor effects and side  effects   Outcome: Progressing  Goal: Refrain from isolation  Description: Interventions:  - Develop a trusting relationship   - Encourage socialization   Outcome: Progressing  Goal: Attend and participate in unit activities, including therapeutic, recreational, and educational groups  Description: Interventions:  - Provide therapeutic and educational activities daily, encourage attendance and participation, and document same in the medical record   Outcome: Progressing     Problem: Anxiety  Goal: Anxiety is at manageable level  Description: Interventions:  - Assess and monitor patient's anxiety level.   - Monitor for signs and symptoms (heart palpitations, chest pain, shortness of breath, headaches, nausea, feeling jumpy, restlessness, irritable, apprehensive).   - Collaborate with interdisciplinary team and initiate plan and interventions as ordered.  - [place] patient to unit/surroundings  - Explain treatment plan  - Encourage participation in care  - Encourage verbalization of concerns/fears  - Identify coping mechanisms  - Assist in developing anxiety-reducing skills  - Administer/offer alternative therapies  - Limit or eliminate stimulants  Outcome: Progressing     Problem: Alteration in Orientation  Goal: Interact with staff daily  Description: Interventions:  - Assess and re-assess patient's level of orientation  - Engage patient in 1 on 1 interactions, daily, for a minimum of 15 minutes   - Establish rapport/trust with patient   Outcome: Progressing  Goal: Cooperate with recommended testing/procedures  Description: Interventions:  - Determine need for ancillary testing  - Observe for mental status changes  - Implement falls/precaution protocol   Outcome: Progressing  Goal: Attend and participate in unit activities, including therapeutic, recreational, and educational groups  Description: Interventions:  - Provide therapeutic and educational activities daily, encourage attendance and participation, and  document same in the medical record   - Provide appropriate opportunities for reminiscence   - Provide a consistent daily routine   - Encourage family contact/visitation   Outcome: Progressing     Problem: DISCHARGE PLANNING - CARE MANAGEMENT  Goal: Discharge to post-acute care or home with appropriate resources  Description: INTERVENTIONS:  - Conduct assessment to determine patient/family and health care team treatment goals, and need for post-acute services based on payer coverage, community resources, and patient preferences, and barriers to discharge  - Address psychosocial, clinical, and financial barriers to discharge as identified in assessment in conjunction with the patient/family and health care team  - Arrange appropriate level of post-acute services according to patient’s   needs and preference and payer coverage in collaboration with the physician and health care team  - Communicate with and update the patient/family, physician, and health care team regarding progress on the discharge plan  - Arrange appropriate transportation to post-acute venues  Outcome: Progressing     Problem: Alteration in Thoughts and Perception  Goal: Treatment Goal: Gain control of psychotic behaviors/thinking, reduce/eliminate presenting symptoms and demonstrate improved reality functioning upon discharge  Outcome: Not Progressing  Goal: Refrain from acting on delusional thinking/internal stimuli  Description: Interventions:  - Monitor patient closely, per order   - Utilize least restrictive measures   - Set reasonable limits, give positive feedback for acceptable   - Administer medications as ordered and monitor of potential side effects  Outcome: Not Progressing  Goal: Complete daily ADLs, including personal hygiene independently, as able  Description: Interventions:  - Observe, teach, and assist patient with ADLS  - Monitor and promote a balance of rest/activity, with adequate nutrition and elimination   Outcome: Not  Progressing     Problem: Depression  Goal: Refrain from self-neglect  Outcome: Not Progressing  Goal: Complete daily ADLs, including personal hygiene independently, as able  Description: Interventions:  - Observe, teach, and assist patient with ADLS  -  Monitor and promote a balance of rest/activity, with adequate nutrition and elimination   Outcome: Not Progressing

## 2024-07-11 NOTE — PROGRESS NOTES
07/11/24 0713   Team Meeting   Meeting Type Daily Rounds   Team Members Present   Team Members Present Physician;Nurse;;Other (Discipline and Name)   Patient/Family Present   Patient Present No   Patient's Family Present No     In attendance:  Dr. Alex Thomas, MD Dr. Jordan Holter, HOLLI Weiss, RN  Luisana Alvarado, [facility]  Carla Lux [facility]    Groups: 4/9    Pt utilized Lidocaine swish x2 yesterday for tongue pain. Pt reports AH are ongoing and unchanged. Pt denied other symptoms.

## 2024-07-11 NOTE — NURSING NOTE
Pt is accepting of medications without incidence and meal compliant with lunch, but slept through breakfast. Pt reports AH, but denies all other s/s. Pt has been visible and social with select few peers or resting in bed. No new concerns at this time.

## 2024-07-11 NOTE — NURSING NOTE
Received pt in bed at change of shift with eyes closed; chest movement noted.  Continues the same thus this far as per q 7 min room checks.   Will continue to monitor behavior, sleeping pattern and any medical issues that may arise.    0553: Sleeping 7+ hrs thus this far

## 2024-07-11 NOTE — NURSING NOTE
"Pt is present on the milieu and social with select peers. He consumed 50 % of dinner. Took his medications without incidence. Nicorette gum given at 1713 and 2118. PRN lidocaine viscous given at 1815 for mouth pain. Pt endorses anxiety and depression and \"the same\" AH. Pt has flat affect, but is otherwise polite and cooperative. Attends groups. No behavioral issues.   "

## 2024-07-11 NOTE — PROGRESS NOTES
Psychiatry Progress Note [facility]    Alberto Berumen 28 y.o. male MRN: [id number]  Unit/Bed#: -02 Encounter: [id number]  Code Status: Level 1 - Full Code    PCP: Joce Juan MD    Date of Admission:  3/29/2024 2008   Date of Service:  07/11/24    Patient Active Problem List   Diagnosis    GERD (gastroesophageal reflux disease)    Medical clearance for psychiatric admission    Schizoaffective disorder, bipolar type (HCC)    Tobacco abuse    T wave inversion in EKG    Syringoma    Chronic idiopathic constipation    Vitamin B 12 deficiency    Vitamin D deficiency    Confluent and reticulate papillomatosis    Class 2 obesity in adult    Primary hypertension    Elevated hemoglobin A1c    Bilateral lower extremity edema     Review of systems: Continues to use lidocaine swish in his mouth (lip biting his tongue during ECT last Friday and otherwise unremarkable  psychiatric diagnosis: Schizoaffective bipolar type   assessment  Overall Status: Status quo with no significant changes  certification Statement: The patient will continue to require additional inpatient hospital stay due to ongoing voices that are threatening to kill him and his family lack of response to medications and ECT so far.     Medications: Clozapine 500 mg at bedtime, propranolol 10 mg every 12 hours as as needed for anxiety, Abilify 15 mg mg at bedtime Lexapro 20 mg once a day, atropine eyedrops sublingual for drooling of saliva and senna 2 tablets twice a day for constipation  All medications reviewed and recommend they be continued for symptom management  side effects from treatment: None reported  Medication changes   None today  Medication education   Risks side effects benefits and precautions of medications discussed with patient and he did verbalize an understanding about risks for metabolic syndrome from being on neuroleptics and is form tardive dyskinesia etc. and special precautions about being on  clozapine   Understanding of medications: Has some understanding   Justification for dual anti-psychotics: Not applicable    Non-pharmacological treatments  Continue with individual, group, milieu and occupational therapy using recovery principles and psycho-education about accepting illness and the need for treatment.   to contact aunt and explore ACT team referral which he never had  ECT to be continued  q 2 weekly for maintenance x 6  Refuses to see a dietician and agrees to do more exercises as he is about 100 lbs overweight   Prolactin level high so Risperdal replaced with Abilify    Safety  Safety and communication plan established to target dynamic risk factors discussed above.    Discharge Plan   Back to his aunt with an ACT team once ECT is completed    Interval Progress   Note manage significant changes as he still hears voices threatening to kill him and his family that makes him feel depressed with occasional passive death wishes with no active intent or plans and able to continue to contract for safety.  Still believes ECTs have definitely helped him to stay out of his head but still voices some frustration over the voices do not bother him completely.  No behavioral PRNs needed lately and not aggressive or agitated or threatening and remains somewhat evasive, guarded, preoccupied and hoping to continue with maintenance ECTs as scheduled every 2 weeks.  Hygiene and grooming still not great staying to himself with a constricted to flat affect more visible but not as social as before attending some more groups     Acceptance by patient: Accepting  Hopefulness in recovery: Living with aunt and sister  Involved in reintegration process: Talking with aunt and sister  Trusting in relationship with psychiatrist: Trusting  Sleep: Good  Appetite: Good  Compliance with Medications: Good  Group attendance: about half of the groups 4/9  Significant events: No significant changes    mental Status  Exam  Appearance: age appropriate, improved grooming, looks older than stated age, overweight, with hair in dreadlocks casually dressed with a clean shaven face, fairly groomed with good eye contact found laying on bed under the covers and was startled when I tried to wake him up.  behavior: cooperative, mildly anxious, evasive, gesturing, slow responses.   speech: normal rate, normal volume, normal pitch  Mood: dysphoric, anxious, continues to report feeling good with the ECT   affect: constricted, inappropriate, mood-congruent still anxious sad with constricted to flat affect   thought Process: organized, logical, coherent, goal directed, linear, decreased rate of thoughts, slowing of thoughts, negative thinking, impaired abstract reasoning, concrete  Thought Content: paranoid ideation, some paranoia, grandiose ideas, intrusive thoughts, preoccupied, chronic, continues to report paranoia about people threatening to kill him and his family because of the voices.  He believes ECT has helped so that he is not much in his head.  No other delusions elicited.  No current suicidal or homicidal thoughts and no plans verbalized but today reports having passive death wishes because of voices with no plans and able to CFS and it has not changed yet  .  No phobias obsessions compulsions or distorted body perceptions reported.  Preoccupied with wanting to get ECTs more often despite reminding him that it may impair his memory and finally he agreed to such as it is  every 2 weeks  Perceptual Disturbances: Still with same threatening voices as before not commanding  Hx Risk Factors: chronic psychiatric problems, chronic anxiety symptoms, chronic psychotic symptoms,    Sensorium: Oriented x 3 spheres and situation  Cognition: recent and remote memory grossly intact  Consciousness: alert and awake  Attention: attention span and concentration are age appropriate  Intellect: appears to be of average intelligence  Insight:  intact  Judgement: intact  Motor Activity: no abnormal movements     Vitals  Temp:  [97.6 °F (36.4 °C)-98.2 °F (36.8 °C)] 98.2 °F (36.8 °C)  HR:  [] 105  Resp:  [18] 18  BP: (120-141)/(79-84) 120/79  SpO2:  [98 %-99 %] 99 %  No intake or output data in the 24 hours ending 07/11/24 9097              Lab Results: All Labs For Current Hospital Admission Reviewed     Current Facility-Administered Medications   Medication Dose Route Frequency Provider Last Rate    acetaminophen  650 mg Oral Q4H PRN Jordan C Holter, DO      acetaminophen  650 mg Oral Q6H PRN HOLLI Lion      aluminum-magnesium hydroxide-simethicone  30 mL Oral Q4H PRN Jordan C Holter, DO      amLODIPine  5 mg Oral Daily STEVE LionNP      ARIPiprazole  15 mg Oral Daily Bora Rosario MD      Artificial Tears  1 drop Both Eyes Q3H PRN Jordan C Holter, DO      atropine  1 drop Sublingual Daily PRN HOLLI Lion      haloperidol lactate  2.5 mg Intramuscular Q4H PRN Max 4/day STEVE LionNP      And    LORazepam  1 mg Intramuscular Q4H PRN Max 4/day STEVE LionNP      And    benztropine  0.5 mg Intramuscular Q4H PRN Max 4/day STEVE LionNP      benztropine  1 mg Intramuscular Q4H PRN Max 6/day Jordan C Holter, DO      haloperidol lactate  5 mg Intramuscular Q4H PRN Max 4/day STEVE LionNP      And    LORazepam  2 mg Intramuscular Q4H PRN Max 4/day STEVE LionNP      And    benztropine  1 mg Intramuscular Q4H PRN Max 4/day HOLLI Lion      benztropine  1 mg Oral Q4H PRN Max 6/day HOLLI Lion      benztropine  1 mg Oral Q4H PRN Max 6/day Jordan C Holter, DO      bisacodyl  10 mg Rectal Daily PRN HOLLI Lion      calcium carbonate  500 mg Oral BID PRN HOLLI Monge      cloZAPine  500 mg Oral HS Bora Rosario MD      cyanocobalamin  1,000 mcg Oral Daily HOLLI Galvan      hydrOXYzine HCL  50 mg Oral Q6H PRN Max 4/day Jordan C Holter, DO      Or    diphenhydrAMINE  Ying  mg Intramuscular Q6H PRN Ion Dupontter, DO      hydrOXYzine HCL  50 mg Oral Q6H PRN Max 4/day HOLLI Lion      Or    diphenhydrAMINE  50 mg Intramuscular Q6H PRN HOLLI Lion      diphenhydrAMINE-zinc acetate   Topical BID PRN HOLLI Lion      escitalopram  20 mg Oral Daily HOLLI Lion      famotidine  20 mg Oral BID Eileen GonzalezmiryamjaylenHOLLI      fluticasone  1 spray Each Nare Daily Brina Guillen MD      glycopyrrolate  1 mg Oral TID Bora Rosario MD      haloperidol  1 mg Oral Q6H PRN HOLLI Lion      haloperidol  2.5 mg Oral Q4H PRN Max 4/day HOLLI Lion      haloperidol  5 mg Oral Q4H PRN Max 4/day HOLLI Lion      hydroCHLOROthiazide  12.5 mg Oral Daily HOLLI Galvan      hydrocortisone   Topical 4x Daily PRN HOLLI Lion      hydrOXYzine HCL  100 mg Oral Q6H PRN Max 4/day Jordan C Holter, DO      Or    LORazepam  2 mg Intramuscular Q6H PRN Jordan C Holter, DO      hydrOXYzine HCL  100 mg Oral Q6H PRN Max 4/day HOLLI Lion      Or    LORazepam  2 mg Intramuscular Q6H PRN HOLLI Lion      hydrOXYzine HCL  25 mg Oral Q6H PRN Max 4/day Ion Dupontter, DO      ibuprofen  600 mg Oral Q8H PRN HOLLI Lion      lactated ringers  75 mL/hr Intravenous Continuous Anjel Arriaza CRNA Stopped (06/21/24 0729)    lactated ringers  50 mL/hr Intravenous Continuous Ramya Arana MD Stopped (06/21/24 0729)    lactated ringers  50 mL/hr Intravenous Continuous Ramya Arana MD Stopped (06/21/24 0729)    loratadine  10 mg Oral Daily Brina Guillen MD      melatonin  3 mg Oral HS Jordan C Holter, DO      metFORMIN  500 mg Oral BID With Meals HOLLI Galvan      methocarbamol  500 mg Oral Q6H PRN HOLLI Lion      nicotine polacrilex  2 mg Oral Q4H PRN Bora Rosario MD      OLANZapine  5 mg Oral Q4H PRN Max 3/day Jordan C Holter, DO      Or    OLANZapine  2.5 mg Intramuscular Q4H PRN Max 3/day Jordan C Holter, DO       OLANZapine  5 mg Oral Q3H PRN Max 3/day Ion Dupontter, DO      Or    OLANZapine  5 mg Intramuscular Q3H PRN Max 3/day Ion Dupontter, DO      OLANZapine  2.5 mg Oral Q4H PRN Max 6/day Ion Dupontter, DO      ondansetron  4 mg Oral Q6H PRN HOLLI Lion      pantoprazole  20 mg Oral Early Morning Eileen Jensen, HOLLI      polyethylene glycol  17 g Oral Daily HOLLI Lion      polyethylene glycol  17 g Oral Daily PRN Jordan C Holter, DO      propranolol  10 mg Oral Q12H KAYLIE HOLLI Lion      senna-docusate sodium  1 tablet Oral Daily PRN Jordan C Holter, DO      senna-docusate sodium  2 tablet Oral BID HOLLI Lion      traZODone  50 mg Oral HS PRN HOLLI Lion      white petrolatum-mineral oil   Topical TID PRN HOLLI Lion         Counseling / Coordination of Care: Total floor / unit time spent today 15 minutes. Greater than 50% of total time was spent with the patient and / or family counseling and / or somewhat receptive to supportive listening and teaching positive coping skills to deal with symptom mangement.     Patient's Rights, confidentiality and exceptions to confidentiality, use of automated medical record, Behavioral Health Services staff access to medical record, and consent to treatment reviewed.    This note has been dictated and hence there may be problems with punctuation, spelling and formatting and if anyone has any concerns please address them to Dr. Rosario   This note is not shared with patient due to potential for making patient's condition worse by knowing the content of the note.

## 2024-07-11 NOTE — NURSING NOTE
Pt is visible on the unit and social with select peers. Consumed 100% of dinner. Took medications without incidence. Pt is pleasant and cooperative. Attended most evening groups. Reports anxiety, depression, and AH that threaten to kill him and his family, but he does his best to try and ignore them. No behavioral issues. Pt offers no concerns or complaints. VSS. Continuous safety checks maintained.

## 2024-07-12 PROCEDURE — 99232 SBSQ HOSP IP/OBS MODERATE 35: CPT | Performed by: PSYCHIATRY & NEUROLOGY

## 2024-07-12 RX ADMIN — PANTOPRAZOLE SODIUM 20 MG: 20 TABLET, DELAYED RELEASE ORAL at 06:06

## 2024-07-12 RX ADMIN — ARIPIPRAZOLE 15 MG: 15 TABLET ORAL at 08:43

## 2024-07-12 RX ADMIN — HYDROCHLOROTHIAZIDE 12.5 MG: 12.5 TABLET ORAL at 08:44

## 2024-07-12 RX ADMIN — FAMOTIDINE 20 MG: 20 TABLET ORAL at 17:12

## 2024-07-12 RX ADMIN — POLYETHYLENE GLYCOL 3350 17 G: 17 POWDER, FOR SOLUTION ORAL at 08:42

## 2024-07-12 RX ADMIN — SENNOSIDES AND DOCUSATE SODIUM 2 TABLET: 8.6; 5 TABLET ORAL at 08:43

## 2024-07-12 RX ADMIN — NICOTINE POLACRILEX 2 MG: 2 GUM, CHEWING ORAL at 17:12

## 2024-07-12 RX ADMIN — NICOTINE POLACRILEX 2 MG: 2 GUM, CHEWING ORAL at 08:43

## 2024-07-12 RX ADMIN — ESCITALOPRAM OXALATE 20 MG: 10 TABLET, FILM COATED ORAL at 08:44

## 2024-07-12 RX ADMIN — GLYCOPYRROLATE 1 MG: 1 TABLET ORAL at 17:12

## 2024-07-12 RX ADMIN — GLYCOPYRROLATE 1 MG: 1 TABLET ORAL at 21:22

## 2024-07-12 RX ADMIN — NICOTINE POLACRILEX 2 MG: 2 GUM, CHEWING ORAL at 12:49

## 2024-07-12 RX ADMIN — CYANOCOBALAMIN TAB 1000 MCG 1000 MCG: 1000 TAB at 08:44

## 2024-07-12 RX ADMIN — NICOTINE POLACRILEX 2 MG: 2 GUM, CHEWING ORAL at 21:31

## 2024-07-12 RX ADMIN — LIDOCAINE HYDROCHLORIDE 15 ML: 20 SOLUTION ORAL at 08:43

## 2024-07-12 RX ADMIN — CLOZAPINE 500 MG: 100 TABLET ORAL at 21:22

## 2024-07-12 RX ADMIN — LORATADINE 10 MG: 10 TABLET ORAL at 08:44

## 2024-07-12 RX ADMIN — MELATONIN TAB 3 MG 3 MG: 3 TAB at 21:22

## 2024-07-12 RX ADMIN — LIDOCAINE HYDROCHLORIDE 15 ML: 20 SOLUTION ORAL at 12:31

## 2024-07-12 RX ADMIN — FAMOTIDINE 20 MG: 20 TABLET ORAL at 08:43

## 2024-07-12 RX ADMIN — SENNOSIDES AND DOCUSATE SODIUM 2 TABLET: 8.6; 5 TABLET ORAL at 17:12

## 2024-07-12 RX ADMIN — METFORMIN HYDROCHLORIDE 500 MG: 500 TABLET, FILM COATED ORAL at 08:44

## 2024-07-12 RX ADMIN — FLUTICASONE PROPIONATE 1 SPRAY: 50 SPRAY, METERED NASAL at 08:43

## 2024-07-12 RX ADMIN — PROPRANOLOL HYDROCHLORIDE 10 MG: 10 TABLET ORAL at 21:22

## 2024-07-12 RX ADMIN — METFORMIN HYDROCHLORIDE 500 MG: 500 TABLET, FILM COATED ORAL at 17:12

## 2024-07-12 RX ADMIN — GLYCOPYRROLATE 1 MG: 1 TABLET ORAL at 08:43

## 2024-07-12 RX ADMIN — AMLODIPINE BESYLATE 5 MG: 5 TABLET ORAL at 08:44

## 2024-07-12 RX ADMIN — LIDOCAINE HYDROCHLORIDE 15 ML: 20 SOLUTION ORAL at 17:23

## 2024-07-12 RX ADMIN — PROPRANOLOL HYDROCHLORIDE 10 MG: 10 TABLET ORAL at 08:44

## 2024-07-12 NOTE — PROGRESS NOTES
Psychiatry Progress Note [facility]    Alberto Berumen 28 y.o. male MRN: [id number]  Unit/Bed#: [facility] 108-02 Encounter: [id number]  Code Status: Level 1 - Full Code    PCP: Joce Juan MD    Date of Admission:  3/29/2024 2008   Date of Service:  07/12/24    Patient Active Problem List   Diagnosis    GERD (gastroesophageal reflux disease)    Medical clearance for psychiatric admission    Schizoaffective disorder, bipolar type (HCC)    Tobacco abuse    T wave inversion in EKG    Syringoma    Chronic idiopathic constipation    Vitamin B 12 deficiency    Vitamin D deficiency    Confluent and reticulate papillomatosis    Class 2 obesity in adult    Primary hypertension    Elevated hemoglobin A1c    Bilateral lower extremity edema     Review of systems: Continues to use lidocaine swish in his mouth for [place] biting his tongue during ECT otherwise unremarkable psychiatric diagnosis: Schizoaffective bipolar type  assessment  Overall Status: Status quo  certification Statement: The patient will continue to require additional inpatient hospital stay due to ongoing voices that are threatening to kill him and his family lack of response to medications and ECT so far.     Medications: Clozapine 500 mg at bedtime, propranolol 10 mg every 12 hours as as needed for anxiety, Abilify 15 mg mg at bedtime Lexapro 20 mg once a day, atropine eyedrops sublingual for drooling of saliva and senna 2 tablets twice a day for constipation  All medications reviewed and recommend they be continued for symptom management  side effects from treatment: None reported  Medication changes   None today  Medication education   Risks side effects benefits and precautions of medications discussed with patient and he did verbalize an understanding about risks for metabolic syndrome from being on neuroleptics and is form tardive dyskinesia etc. and special precautions about being on clozapine   Understanding of medications: Has some  understanding   Justification for dual anti-psychotics: Not applicable    Non-pharmacological treatments  Continue with individual, group, milieu and occupational therapy using recovery principles and psycho-education about accepting illness and the need for treatment.   to contact aunt and explore ACT team referral which he never had  ECT to be continued  q 2 weekly for maintenance x 6  Refuses to see a dietician and agrees to do more exercises as he is about 100 lbs overweight   Prolactin level high so Risperdal replaced with Abilify    Safety  Safety and communication plan established to target dynamic risk factors discussed above.    Discharge Plan   Back to his aunt with an ACT team once ECT is completed    Interval Progress   Continues to report no significant changes still hearing same threatening voices to kill him and his family that makes him feel sad with occasional passive death wishes with no active intent or plans.  Continues to insist ECTs have definitely helped him to stay out of his head and voices some frustration over the voices not going away completely.  No behavioral PRNs needed lately and not aggressive or agitated or threatening and remains somewhat guarded evasive preoccupied and still requesting more ECTs which he is now waiting his every 2 weeks for maintenance.  Hygiene and grooming still not great staying to himself with constricted to flat affect more visible but not as social as before attending some more groups interacting with select peers,    Acceptance by patient: Accepting  Hopefulness in recovery: Living with aunt and sister  Involved in reintegration process: Talking with aunt and sister  Trusting in relationship with psychiatrist: Trusting  Sleep: Good  Appetite: Good  Compliance with Medications: Good  Group attendance: about half of the groups 4/8  Significant events: No significant changes    mental Status Exam  Appearance: age appropriate, improved grooming,  looks older than stated age, overweight, with hair in dreadlocks casually dressed with a clean shaven face, fairly groomed with good eye contact found walking the hallway somewhat friendly but distant and aloof wearing hospital clothing as usual   behavior: cooperative, mildly anxious, evasive, gesturing, slow responses.   speech: normal rate, normal volume, normal pitch  Mood: dysphoric, anxious, continues to report feeling good with the ECT   affect: constricted, inappropriate, mood-congruent still anxious sad with constricted to flat affect   thought Process: organized, logical, coherent, goal directed, linear, decreased rate of thoughts, slowing of thoughts, negative thinking, impaired abstract reasoning, concrete  Thought Content: paranoid ideation, some paranoia, grandiose ideas, intrusive thoughts, preoccupied, chronic, continues to report paranoia about people threatening to kill him and his family because of the voices.  He believes ECT has helped so that he is not much in his head.  No other delusions elicited.  No current suicidal or homicidal thoughts and no plans verbalized but today reports having passive death wishes because of voices with no plans and able to CFS and it has not changed yet  .  No phobias obsessions compulsions or distorted body perceptions reported.  Preoccupied with wanting to get ECTs more often despite reminding him that it may impair his memory and finally he agreed to such as it is  every 2 weeks  Perceptual Disturbances: Still with same threatening voices as before not commanding   Hx Risk Factors: chronic psychiatric problems, chronic anxiety symptoms, chronic psychotic symptoms,    Sensorium: Oriented x 3 spheres and situation  Cognition: recent and remote memory grossly intact  Consciousness: alert and awake  Attention: attention span and concentration are age appropriate  Intellect: appears to be of average intelligence  Insight: intact  Judgement: intact  Motor Activity:  no abnormal movements     Vitals  Temp:  [97.6 °F (36.4 °C)-97.7 °F (36.5 °C)] 97.7 °F (36.5 °C)  HR:  [] 104  Resp:  [18] 18  BP: (134-144)/(64-96) 137/96  SpO2:  [96 %-99 %] 99 %  No intake or output data in the 24 hours ending 07/12/24 0606              Lab Results: All Labs For Current Hospital Admission Reviewed     Current Facility-Administered Medications   Medication Dose Route Frequency Provider Last Rate    acetaminophen  650 mg Oral Q4H PRN Jordan C Holter, DO      acetaminophen  650 mg Oral Q6H PRN HOLLI Lion      aluminum-magnesium hydroxide-simethicone  30 mL Oral Q4H PRN Jordan C Holter, DO      amLODIPine  5 mg Oral Daily STEVE LionNP      ARIPiprazole  15 mg Oral Daily Bora Rosario MD      Artificial Tears  1 drop Both Eyes Q3H PRN Jordan C Holter, DO      atropine  1 drop Sublingual Daily PRN HOLLI Lion      haloperidol lactate  2.5 mg Intramuscular Q4H PRN Max 4/day STEVE LionNP      And    LORazepam  1 mg Intramuscular Q4H PRN Max 4/day HOLLI Lion      And    benztropine  0.5 mg Intramuscular Q4H PRN Max 4/day HOLLI Lion      benztropine  1 mg Intramuscular Q4H PRN Max 6/day Jordan C Holter, DO      haloperidol lactate  5 mg Intramuscular Q4H PRN Max 4/day HOLLI Lion      And    LORazepam  2 mg Intramuscular Q4H PRN Max 4/day HOLLI Lion      And    benztropine  1 mg Intramuscular Q4H PRN Max 4/day HOLLI Lion      benztropine  1 mg Oral Q4H PRN Max 6/day HOLLI Lion      benztropine  1 mg Oral Q4H PRN Max 6/day Jordan C Holter, DO      bisacodyl  10 mg Rectal Daily PRN HOLLI Lion      calcium carbonate  500 mg Oral BID PRN STEVE MongeNP      cloZAPine  500 mg Oral HS Bora Rosario MD      cyanocobalamin  1,000 mcg Oral Daily HOLLI Galvan      hydrOXYzine HCL  50 mg Oral Q6H PRN Max 4/day Jordan C Holter, DO      Or    diphenhydrAMINE  50 mg Intramuscular Q6H PRN Jordan C Holter,  DO      hydrOXYzine HCL  50 mg Oral Q6H PRN Max 4/day HOLLI Lion      Or    diphenhydrAMINE  50 mg Intramuscular Q6H PRN HOLLI Lion      diphenhydrAMINE-zinc acetate   Topical BID PRN HOLLI Lion      escitalopram  20 mg Oral Daily HOLLI Lion      famotidine  20 mg Oral BID HOLLI Monge      fluticasone  1 spray Each Nare Daily Brina Guillen MD      glycopyrrolate  1 mg Oral TID Bora Rosario MD      haloperidol  1 mg Oral Q6H PRN HOLLI Lion      haloperidol  2.5 mg Oral Q4H PRN Max 4/day HOLLI Lion      haloperidol  5 mg Oral Q4H PRN Max 4/day HOLLI Lion      hydroCHLOROthiazide  12.5 mg Oral Daily HOLLI Galvan      hydrocortisone   Topical 4x Daily PRN HLOLI Lion      hydrOXYzine HCL  100 mg Oral Q6H PRN Max 4/day Jordan C Holter, DO      Or    LORazepam  2 mg Intramuscular Q6H PRN Jordan C Holter, DO      hydrOXYzine HCL  100 mg Oral Q6H PRN Max 4/day HOLLI Lion      Or    LORazepam  2 mg Intramuscular Q6H PRN HOLLI Lion      hydrOXYzine HCL  25 mg Oral Q6H PRN Max 4/day Jordan C Holter, DO      ibuprofen  600 mg Oral Q8H PRN HOLLI Lion      lactated ringers  75 mL/hr Intravenous Continuous Anjel Arriaza CRNA Stopped (06/21/24 0729)    lactated ringers  50 mL/hr Intravenous Continuous Ramya Arana MD Stopped (06/21/24 0729)    lactated ringers  50 mL/hr Intravenous Continuous Ramya Arana MD Stopped (06/21/24 0729)    Lidocaine Viscous HCl  15 mL Swish & Spit 4x Daily PRN HOLLI Monge      loratadine  10 mg Oral Daily Brina Guillen MD      melatonin  3 mg Oral HS Jordan C Holter, DO      metFORMIN  500 mg Oral BID With Meals HOLLI Galvan      methocarbamol  500 mg Oral Q6H PRN HOLLI Lion      nicotine polacrilex  2 mg Oral Q4H PRN Bora Rosario MD      OLANZapine  5 mg Oral Q4H PRN Max 3/day Jordan C Holter, DO      Or    OLANZapine  2.5 mg  Intramuscular Q4H PRN Max 3/day Ion C Holter, DO      OLANZapine  5 mg Oral Q3H PRN Max 3/day Ion C Holter, DO      Or    OLANZapine  5 mg Intramuscular Q3H PRN Max 3/day Ion C Holter, DO      OLANZapine  2.5 mg Oral Q4H PRN Max 6/day Ion C Holter, DO      ondansetron  4 mg Oral Q6H PRN HOLLI Lion      pantoprazole  20 mg Oral Early Morning Eileen Gonzalezmiryamjaylen, HOLLI      polyethylene glycol  17 g Oral Daily HOLLI Lion      polyethylene glycol  17 g Oral Daily PRN Ion FISCHER Holter, DO      propranolol  10 mg Oral Q12H KAYLIE HOLLI Lion      senna-docusate sodium  1 tablet Oral Daily PRN Ion Dupontter, DO      senna-docusate sodium  2 tablet Oral BID HOLLI Lion      traZODone  50 mg Oral HS PRN HOLLI Lion      white petrolatum-mineral oil   Topical TID PRN HOLLI Lion         Counseling / Coordination of Care: Total floor / unit time spent today 15 minutes. Greater than 50% of total time was spent with the patient and / or family counseling and / or somewhat receptive to supportive listening and teaching positive coping skills to deal with symptom mangement.     Patient's Rights, confidentiality and exceptions to confidentiality, use of automated medical record, Behavioral Health Services staff access to medical record, and consent to treatment reviewed.    This note has been dictated and hence there may be problems with punctuation, spelling and formatting and if anyone has any concerns please address them to Dr. Rosario   This note is not shared with patient due to potential for making patient's condition worse by knowing the content of the note.

## 2024-07-12 NOTE — NURSING NOTE
"Pt is visible on the unit and social with select peers. Consumed 100% of all meals. Took medications without incidence. Pt is pleasant and cooperative. Attended select groups. Reports AH that he \"tries to ignore\". No behavioral issues. Pt offers no concerns or complaints. VSS. Continuous safety checks maintained.  "

## 2024-07-12 NOTE — NURSING NOTE
Pt received Xylocaine 15 mL swish and spit for mouth pain at 0843 and 1231 to help with mouth pain at meal times. Pt reports this is effective to help with pain from biting his tongue in a previous ECT session.

## 2024-07-12 NOTE — PROGRESS NOTES
07/12/24 0743   Team Meeting   Meeting Type Daily Rounds   Team Members Present   Team Members Present Physician;Nurse;;Other (Discipline and Name)   Physician Team Member Thomas, Holter, Hangey CRNP   Nursing Team Member Thaddeus   Social Work Team Member Jose J ORTEGA   Patient/Family Present   Patient Present No   Patient's Family Present No     Groups Participation  4/8.   Patient's compliant with medications. He's appropriate but had a negative interaction with a male peer which required security but he was able to deescalate, no medications needed.

## 2024-07-12 NOTE — NURSING NOTE
Pt was involved in a verbal altercation with a peer over the patient phones. This peer accused him of sitting on the phone but not speaking to anyone. They continued to exchange words and Alberto stood up to be face to face with the other peer. Staff was able to redirect Alberto and he walked away from the peer. Alberto was able to calm himself down and remain in control of his behavior without any PRN medication.

## 2024-07-12 NOTE — PLAN OF CARE
Problem: Alteration in Thoughts and Perception  Goal: Treatment Goal: Gain control of psychotic behaviors/thinking, reduce/eliminate presenting symptoms and demonstrate improved reality functioning upon discharge  Outcome: Progressing  Goal: Verbalize thoughts and feelings  Description: Interventions:  - Promote a nonjudgmental and trusting relationship with the patient through active listening and therapeutic communication  - Assess patient's level of functioning, behavior and potential for risk  - Engage patient in 1 on 1 interactions  - Encourage patient to express fears, feelings, frustrations, and discuss symptoms    - [place] patient to reality, help patient recognize reality-based thinking   - Administer medications as ordered and assess for potential side effects  - Provide the patient education related to the signs and symptoms of the illness and desired effects of prescribed medications  Outcome: Progressing  Goal: Agree to be compliant with medication regime, as prescribed and report medication side effects  Description: Interventions:  - Offer appropriate PRN medication and supervise ingestion; conduct AIMS, as needed   Outcome: Progressing  Goal: Complete daily ADLs, including personal hygiene independently, as able  Description: Interventions:  - Observe, teach, and assist patient with ADLS  - Monitor and promote a balance of rest/activity, with adequate nutrition and elimination   Outcome: Progressing     Problem: Ineffective Coping  Goal: Demonstrates healthy coping skills  Outcome: Progressing  Goal: Participates in unit activities  Description: Interventions:  - Provide therapeutic environment   - Provide required programming   - Redirect inappropriate behaviors   Outcome: Progressing     Problem: Depression  Goal: Refrain from harming self  Description: Interventions:  - Monitor patient closely, per order   - Supervise medication ingestion, monitor effects and side effects   Outcome:  Progressing  Goal: Refrain from isolation  Description: Interventions:  - Develop a trusting relationship   - Encourage socialization   Outcome: Progressing  Goal: Refrain from self-neglect  Outcome: Progressing  Goal: Attend and participate in unit activities, including therapeutic, recreational, and educational groups  Description: Interventions:  - Provide therapeutic and educational activities daily, encourage attendance and participation, and document same in the medical record   Outcome: Progressing  Goal: Complete daily ADLs, including personal hygiene independently, as able  Description: Interventions:  - Observe, teach, and assist patient with ADLS  -  Monitor and promote a balance of rest/activity, with adequate nutrition and elimination   Outcome: Progressing     Problem: Alteration in Orientation  Goal: Interact with staff daily  Description: Interventions:  - Assess and re-assess patient's level of orientation  - Engage patient in 1 on 1 interactions, daily, for a minimum of 15 minutes   - Establish rapport/trust with patient   Outcome: Progressing  Goal: Attend and participate in unit activities, including therapeutic, recreational, and educational groups  Description: Interventions:  - Provide therapeutic and educational activities daily, encourage attendance and participation, and document same in the medical record   - Provide appropriate opportunities for reminiscence   - Provide a consistent daily routine   - Encourage family contact/visitation   Outcome: Progressing  Goal: Complete daily ADLs, including personal hygiene independently, as able  Description: Interventions:  - Observe, teach, and assist patient with ADLS  Outcome: Progressing     Problem: DISCHARGE PLANNING - CARE MANAGEMENT  Goal: Discharge to post-acute care or home with appropriate resources  Description: INTERVENTIONS:  - Conduct assessment to determine patient/family and health care team treatment goals, and need for post-acute  services based on payer coverage, community resources, and patient preferences, and barriers to discharge  - Address psychosocial, clinical, and financial barriers to discharge as identified in assessment in conjunction with the patient/family and health care team  - Arrange appropriate level of post-acute services according to patient’s   needs and preference and payer coverage in collaboration with the physician and health care team  - Communicate with and update the patient/family, physician, and health care team regarding progress on the discharge plan  - Arrange appropriate transportation to post-acute venues  Outcome: Progressing     Problem: Alteration in Thoughts and Perception  Goal: Treatment Goal: Gain control of psychotic behaviors/thinking, reduce/eliminate presenting symptoms and demonstrate improved reality functioning upon discharge  Outcome: Progressing  Goal: Verbalize thoughts and feelings  Description: Interventions:  - Promote a nonjudgmental and trusting relationship with the patient through active listening and therapeutic communication  - Assess patient's level of functioning, behavior and potential for risk  - Engage patient in 1 on 1 interactions  - Encourage patient to express fears, feelings, frustrations, and discuss symptoms    - [place] patient to reality, help patient recognize reality-based thinking   - Administer medications as ordered and assess for potential side effects  - Provide the patient education related to the signs and symptoms of the illness and desired effects of prescribed medications  Outcome: Progressing  Goal: Agree to be compliant with medication regime, as prescribed and report medication side effects  Description: Interventions:  - Offer appropriate PRN medication and supervise ingestion; conduct AIMS, as needed   Outcome: Progressing  Goal: Complete daily ADLs, including personal hygiene independently, as able  Description: Interventions:  - Observe, teach, and  assist patient with ADLS  - Monitor and promote a balance of rest/activity, with adequate nutrition and elimination   Outcome: Progressing     Problem: Ineffective Coping  Goal: Demonstrates healthy coping skills  Outcome: Progressing  Goal: Participates in unit activities  Description: Interventions:  - Provide therapeutic environment   - Provide required programming   - Redirect inappropriate behaviors   Outcome: Progressing     Problem: Depression  Goal: Refrain from harming self  Description: Interventions:  - Monitor patient closely, per order   - Supervise medication ingestion, monitor effects and side effects   Outcome: Progressing  Goal: Refrain from isolation  Description: Interventions:  - Develop a trusting relationship   - Encourage socialization   Outcome: Progressing  Goal: Refrain from self-neglect  Outcome: Progressing  Goal: Attend and participate in unit activities, including therapeutic, recreational, and educational groups  Description: Interventions:  - Provide therapeutic and educational activities daily, encourage attendance and participation, and document same in the medical record   Outcome: Progressing  Goal: Complete daily ADLs, including personal hygiene independently, as able  Description: Interventions:  - Observe, teach, and assist patient with ADLS  -  Monitor and promote a balance of rest/activity, with adequate nutrition and elimination   Outcome: Progressing     Problem: Alteration in Orientation  Goal: Interact with staff daily  Description: Interventions:  - Assess and re-assess patient's level of orientation  - Engage patient in 1 on 1 interactions, daily, for a minimum of 15 minutes   - Establish rapport/trust with patient   Outcome: Progressing  Goal: Attend and participate in unit activities, including therapeutic, recreational, and educational groups  Description: Interventions:  - Provide therapeutic and educational activities daily, encourage attendance and participation,  and document same in the medical record   - Provide appropriate opportunities for reminiscence   - Provide a consistent daily routine   - Encourage family contact/visitation   Outcome: Progressing  Goal: Complete daily ADLs, including personal hygiene independently, as able  Description: Interventions:  - Observe, teach, and assist patient with ADLS  Outcome: Progressing     Problem: DISCHARGE PLANNING - CARE MANAGEMENT  Goal: Discharge to post-acute care or home with appropriate resources  Description: INTERVENTIONS:  - Conduct assessment to determine patient/family and health care team treatment goals, and need for post-acute services based on payer coverage, community resources, and patient preferences, and barriers to discharge  - Address psychosocial, clinical, and financial barriers to discharge as identified in assessment in conjunction with the patient/family and health care team  - Arrange appropriate level of post-acute services according to patient’s   needs and preference and payer coverage in collaboration with the physician and health care team  - Communicate with and update the patient/family, physician, and health care team regarding progress on the discharge plan  - Arrange appropriate transportation to post-acute venues  Outcome: Progressing

## 2024-07-12 NOTE — NURSING NOTE
Received pt awake at change of shift sitting in the lounge. Retired to bed at 0015 and continues to sleep thus this far.  Q 7 min safety checks in progress.     0554:  Sleeping close to 6 hrs thus this far.

## 2024-07-13 PROCEDURE — 99232 SBSQ HOSP IP/OBS MODERATE 35: CPT | Performed by: NURSE PRACTITIONER

## 2024-07-13 RX ADMIN — MELATONIN TAB 3 MG 3 MG: 3 TAB at 21:28

## 2024-07-13 RX ADMIN — GLYCOPYRROLATE 1 MG: 1 TABLET ORAL at 08:56

## 2024-07-13 RX ADMIN — FLUTICASONE PROPIONATE 1 SPRAY: 50 SPRAY, METERED NASAL at 09:11

## 2024-07-13 RX ADMIN — GLYCOPYRROLATE 1 MG: 1 TABLET ORAL at 21:27

## 2024-07-13 RX ADMIN — SENNOSIDES AND DOCUSATE SODIUM 2 TABLET: 8.6; 5 TABLET ORAL at 17:07

## 2024-07-13 RX ADMIN — NICOTINE POLACRILEX 2 MG: 2 GUM, CHEWING ORAL at 17:17

## 2024-07-13 RX ADMIN — NICOTINE POLACRILEX 2 MG: 2 GUM, CHEWING ORAL at 21:29

## 2024-07-13 RX ADMIN — NICOTINE POLACRILEX 2 MG: 2 GUM, CHEWING ORAL at 09:12

## 2024-07-13 RX ADMIN — LIDOCAINE HYDROCHLORIDE 15 ML: 20 SOLUTION ORAL at 17:08

## 2024-07-13 RX ADMIN — LIDOCAINE HYDROCHLORIDE 15 ML: 20 SOLUTION ORAL at 12:21

## 2024-07-13 RX ADMIN — PANTOPRAZOLE SODIUM 20 MG: 20 TABLET, DELAYED RELEASE ORAL at 06:18

## 2024-07-13 RX ADMIN — ESCITALOPRAM OXALATE 20 MG: 10 TABLET, FILM COATED ORAL at 08:56

## 2024-07-13 RX ADMIN — NICOTINE POLACRILEX 2 MG: 2 GUM, CHEWING ORAL at 13:16

## 2024-07-13 RX ADMIN — HYDROCHLOROTHIAZIDE 12.5 MG: 12.5 TABLET ORAL at 08:55

## 2024-07-13 RX ADMIN — METFORMIN HYDROCHLORIDE 500 MG: 500 TABLET, FILM COATED ORAL at 08:56

## 2024-07-13 RX ADMIN — LIDOCAINE HYDROCHLORIDE 15 ML: 20 SOLUTION ORAL at 09:12

## 2024-07-13 RX ADMIN — CLOZAPINE 500 MG: 100 TABLET ORAL at 21:28

## 2024-07-13 RX ADMIN — GLYCOPYRROLATE 1 MG: 1 TABLET ORAL at 17:00

## 2024-07-13 RX ADMIN — SENNOSIDES AND DOCUSATE SODIUM 2 TABLET: 8.6; 5 TABLET ORAL at 08:56

## 2024-07-13 RX ADMIN — FAMOTIDINE 20 MG: 20 TABLET ORAL at 17:08

## 2024-07-13 RX ADMIN — CYANOCOBALAMIN TAB 1000 MCG 1000 MCG: 1000 TAB at 08:56

## 2024-07-13 RX ADMIN — PROPRANOLOL HYDROCHLORIDE 10 MG: 10 TABLET ORAL at 21:28

## 2024-07-13 RX ADMIN — ARIPIPRAZOLE 15 MG: 15 TABLET ORAL at 08:55

## 2024-07-13 RX ADMIN — FAMOTIDINE 20 MG: 20 TABLET ORAL at 08:56

## 2024-07-13 RX ADMIN — LIDOCAINE HYDROCHLORIDE 15 ML: 20 SOLUTION ORAL at 21:44

## 2024-07-13 RX ADMIN — POLYETHYLENE GLYCOL 3350 17 G: 17 POWDER, FOR SOLUTION ORAL at 08:57

## 2024-07-13 RX ADMIN — PROPRANOLOL HYDROCHLORIDE 10 MG: 10 TABLET ORAL at 08:56

## 2024-07-13 RX ADMIN — LORATADINE 10 MG: 10 TABLET ORAL at 08:56

## 2024-07-13 RX ADMIN — METFORMIN HYDROCHLORIDE 500 MG: 500 TABLET, FILM COATED ORAL at 17:08

## 2024-07-13 RX ADMIN — NICOTINE POLACRILEX 2 MG: 2 GUM, CHEWING ORAL at 21:28

## 2024-07-13 RX ADMIN — AMLODIPINE BESYLATE 5 MG: 5 TABLET ORAL at 08:55

## 2024-07-13 NOTE — NURSING NOTE
Patient visible with peers in the dining room most of the evening. Medication compliant. Behaviors controlled and appropriate.Offered no complaints.  Interacting with peers appropriately. Did not come to staff to have any needs met this evening.

## 2024-07-13 NOTE — NURSING NOTE
Patient is blunted, pleasant, and cooperative. Difficult to wake in AM. Visible and social. Refused breakfast despite staff waking multiple times. Ate 100% of lunch. Med comp. Attended open art group. Requesting virtual visit w/ sister. Reports paranoia and AH. Fearful he will never feel safe after D/C. Continuous rounding maintained.

## 2024-07-13 NOTE — NURSING NOTE
Patient slept well throughout the night. Slept 8 hours during this shift.  Appears to be sleeping comfortably at this time.  Eyes closed and chest movements noted.

## 2024-07-13 NOTE — PLAN OF CARE
Problem: Alteration in Thoughts and Perception  Goal: Treatment Goal: Gain control of psychotic behaviors/thinking, reduce/eliminate presenting symptoms and demonstrate improved reality functioning upon discharge  Outcome: Progressing  Goal: Verbalize thoughts and feelings  Description: Interventions:  - Promote a nonjudgmental and trusting relationship with the patient through active listening and therapeutic communication  - Assess patient's level of functioning, behavior and potential for risk  - Engage patient in 1 on 1 interactions  - Encourage patient to express fears, feelings, frustrations, and discuss symptoms    - [place] patient to reality, help patient recognize reality-based thinking   - Administer medications as ordered and assess for potential side effects  - Provide the patient education related to the signs and symptoms of the illness and desired effects of prescribed medications  Outcome: Progressing  Goal: Refrain from acting on delusional thinking/internal stimuli  Description: Interventions:  - Monitor patient closely, per order   - Utilize least restrictive measures   - Set reasonable limits, give positive feedback for acceptable   - Administer medications as ordered and monitor of potential side effects  Outcome: Progressing  Goal: Agree to be compliant with medication regime, as prescribed and report medication side effects  Description: Interventions:  - Offer appropriate PRN medication and supervise ingestion; conduct AIMS, as needed   Outcome: Progressing  Goal: Attend and participate in unit activities, including therapeutic, recreational, and educational groups  Description: Interventions:  -Encourage Visitation and family involvement in care  Outcome: Progressing  Goal: Recognize dysfunctional thoughts, communicate reality-based thoughts at the time of discharge  Description: Interventions:  - Provide medication and psycho-education to assist patient in compliance and developing  insight into his/her illness   Outcome: Progressing     Problem: Electroconvulsive therapy (ECT)  Goal: Treatment Goal: Demonstrate a reduction of confusion and improved orientation to person, place, time and/or situation upon discharge, according to optimum baseline/ability  Outcome: Progressing  Goal: Achieves stable or improved neurological status  Description: INTERVENTIONS  - Monitor and report changes in neurological status  - Monitor vital signs such as temperature, blood pressure, glucose, and any other labs ordered   - Initiate measures to prevent increased intracranial pressure  - Monitor for seizure activity and implement precautions if appropriate      Outcome: Progressing  Goal: Absence of urinary retention  Description: INTERVENTIONS:  - Assess patient’s ability to void and empty bladder  - Monitor I/O  - Bladder scan as needed  - Discuss with physician/AP medications to alleviate retention as needed  - Discuss catheterization for long term situations as appropriate  Outcome: Progressing  Goal: Minimal or absence of nausea and/or vomiting  Description: INTERVENTIONS:  - Administer IV fluids if ordered to ensure adequate hydration  - Maintain NPO status until nausea and vomiting are resolved  - Nasogastric tube if ordered  - Administer ordered antiemetic medications as needed  - Provide nonpharmacologic comfort measures as appropriate  - Advance diet as tolerated, if ordered  - Consider nutrition services referral to assist patient with adequate nutrition and appropriate food choices  Outcome: Progressing  Goal: Maintains adequate nutritional intake  Description: INTERVENTIONS:  - Monitor percentage of each meal consumed  - Identify factors contributing to decreased intake, treat as appropriate  - Assist with meals as needed  - Monitor I&O, weight, and lab values if indicated  - Obtain nutrition services referral as needed  Outcome: Progressing

## 2024-07-13 NOTE — PROGRESS NOTES
Progress Note - Behavioral Health     Alberto Berumen 28 y.o. male MRN: [id number]   Unit/Bed#: [facility] 108-02 Encounter: [id number]      Documentation, nursing notes, medication reconciliation, labs, and vitals reviewed. Patient was seen for continuing care and reviewed with treatment team. No acute events over the past 24 hours.  Per nursing report, no concerns, behavior remains in control.    No medication changes over the past 24 hours. Continues to tolerate current medications with no adverse effects.     On evaluation today, he is seen in the hallway and brightens on approach.  He is more animated than when I saw him 2 months ago.  However, continues with significant paranoia and fears there is someone out to get him and his family.  Still afraid to return home.  Still having the auditory hallucinations.    No suicidal ideation, plan, or intent. Denies perceptual disturbances and does not exhibit any symptoms of aimee on evaluation.    Sleep: slept off and on  Appetite: fair  Medication side effects: No   ROS: no complaints, all other systems are negative    Mental Status Evaluation:    Appearance:  age appropriate   Behavior:  cooperative   Speech:  normal rate, normal volume   Mood:  depressed   Affect:  flat   Thought Process:  goal directed   Associations: intact associations   Thought Content:  persecutory delusions   Perceptual Disturbances: auditory hallucinations still present, but less frequent   Risk Potential: Suicidal ideation - None  Homicidal ideation - None  Potential for aggression - No   Sensorium:  oriented to person, place, and time/date   Memory:  recent and remote memory grossly intact   Consciousness:  alert and awake   Attention: decreased concentration and decreased attention span   Insight:  limited   Judgment: limited   Gait/Station: normal gait/station, normal balance   Motor Activity: no abnormal movements     Vital signs in last 24 hours:    Temp:  [97.3 °F (36.3 °C)-97.8 °F (36.6  °C)] 97.3 °F (36.3 °C)  HR:  [] 89  Resp:  [18] 18  BP: (129-154)/(81-94) 129/87    Laboratory results: I have personally reviewed all pertinent laboratory/tests results.      Progress Toward Goals: gradual improvement    Assessment & Plan   Principal Problem:    Schizoaffective disorder, bipolar type (HCC)  Active Problems:    GERD (gastroesophageal reflux disease)    Medical clearance for psychiatric admission    Tobacco abuse    T wave inversion in EKG    Chronic idiopathic constipation    Confluent and reticulate papillomatosis    Primary hypertension    Elevated hemoglobin A1c    Bilateral lower extremity edema    Recommended Treatment:     Planned medication and treatment changes:    All current active medications have been reviewed  Encourage group therapy, milieu therapy and occupational therapy  Behavioral Health checks every 7 minutes    Requires continued inpatient treatment due to chronic illness and high risk of decompensation if discharged before long term stability is achieved.    Continue current medications:  Current Facility-Administered Medications   Medication Dose Route Frequency Provider Last Rate    acetaminophen  650 mg Oral Q4H PRN Jordan C Holter, DO      acetaminophen  650 mg Oral Q6H PRN HOLLI Lion      aluminum-magnesium hydroxide-simethicone  30 mL Oral Q4H PRN Jordan C Holter,       amLODIPine  5 mg Oral Daily HOLLI Lion      ARIPiprazole  15 mg Oral Daily Bora Rosario MD      Artificial Tears  1 drop Both Eyes Q3H PRN Jordan C Holter, DO      atropine  1 drop Sublingual Daily PRN HOLLI Lion      haloperidol lactate  2.5 mg Intramuscular Q4H PRN Max 4/day HOLLI Lion      And    LORazepam  1 mg Intramuscular Q4H PRN Max 4/day OHLLI Lion      And    benztropine  0.5 mg Intramuscular Q4H PRN Max 4/day HOLLI Lion      benztropine  1 mg Intramuscular Q4H PRN Max 6/day Jordan C Holter,       haloperidol lactate  5 mg Intramuscular  Q4H PRN Max 4/day HOLLI Lion      And    LORazepam  2 mg Intramuscular Q4H PRN Max 4/day HOLLI Lion      And    benztropine  1 mg Intramuscular Q4H PRN Max 4/day HOLLI Lion      benztropine  1 mg Oral Q4H PRN Max 6/day HOLLI Lion      benztropine  1 mg Oral Q4H PRN Max 6/day Jordan C Holter, DO      bisacodyl  10 mg Rectal Daily PRN HOLLI Lion      calcium carbonate  500 mg Oral BID PRN HOLLI Monge      cloZAPine  500 mg Oral HS Bora Rosario MD      cyanocobalamin  1,000 mcg Oral Daily HOLLI Galvan      hydrOXYzine HCL  50 mg Oral Q6H PRN Max 4/day Jordan C Holter, DO      Or    diphenhydrAMINE  50 mg Intramuscular Q6H PRN Jordan C Holter, DO      hydrOXYzine HCL  50 mg Oral Q6H PRN Max 4/day HOLLI Lion      Or    diphenhydrAMINE  50 mg Intramuscular Q6H PRN HOLLI Lion      diphenhydrAMINE-zinc acetate   Topical BID PRN HOLLI Lion      escitalopram  20 mg Oral Daily HOLLI Lion      famotidine  20 mg Oral BID HOLLI Monge      fluticasone  1 spray Each Nare Daily Brina Guillen MD      glycopyrrolate  1 mg Oral TID Bora Rosario MD      haloperidol  1 mg Oral Q6H PRN HOLLI Lion      haloperidol  2.5 mg Oral Q4H PRN Max 4/day HOLLI Lion      haloperidol  5 mg Oral Q4H PRN Max 4/day HOLLI Lion      hydroCHLOROthiazide  12.5 mg Oral Daily HOLLI Galvan      hydrocortisone   Topical 4x Daily PRN HOLLI Lion      hydrOXYzine HCL  100 mg Oral Q6H PRN Max 4/day Jordan C Holter, DO      Or    LORazepam  2 mg Intramuscular Q6H PRN Jordan C Holter, DO      hydrOXYzine HCL  100 mg Oral Q6H PRN Max 4/day HOLLI Lion      Or    LORazepam  2 mg Intramuscular Q6H PRN HOLLI Lion      hydrOXYzine HCL  25 mg Oral Q6H PRN Max 4/day Jordan C Holter,       ibuprofen  600 mg Oral Q8H PRN HOLLI Lion      Lidocaine Viscous HCl  15 mL Swish & Spit 4x Daily PRN  HOLLI Monge      loratadine  10 mg Oral Daily Brina Guillen MD      melatonin  3 mg Oral HS [facility] Holter, DO      metFORMIN  500 mg Oral BID With Meals HOLLI Galvan      methocarbamol  500 mg Oral Q6H PRN HOLLI Lion      nicotine polacrilex  2 mg Oral Q4H PRN Bora Rosario MD      OLANZapine  5 mg Oral Q4H PRN Max 3/day [facility] Holter, DO      Or    OLANZapine  2.5 mg Intramuscular Q4H PRN Max 3/day [facility] Holter, DO      OLANZapine  5 mg Oral Q3H PRN Max 3/day [facility] Holter, DO      Or    OLANZapine  5 mg Intramuscular Q3H PRN Max 3/day [facility] Holter, DO      OLANZapine  2.5 mg Oral Q4H PRN Max 6/day [facility] Holter, DO      ondansetron  4 mg Oral Q6H PRN HOLLI Lion      pantoprazole  20 mg Oral Early Morning HOLLI Monge      polyethylene glycol  17 g Oral Daily HOLLI Lion      polyethylene glycol  17 g Oral Daily PRN [facility] Holter, DO      propranolol  10 mg Oral Q12H KAYLIE HOLLI Lion      senna-docusate sodium  1 tablet Oral Daily PRN [facility] Holter, DO      senna-docusate sodium  2 tablet Oral BID HOLLI Lion      traZODone  50 mg Oral HS PRN HOLLI Lion      white petrolatum-mineral oil   Topical TID PRN HOLLI Lion         Risks / Benefits of Treatment:    Risks, benefits, and possible side effects of medications explained to patient and patient verbalizes understanding and agreement for treatment.    Counseling / Coordination of Care:    Patient's progress discussed with staff in treatment team meeting.  Medications, treatment progress and treatment plan reviewed with patient.    HOLLI Anderson 07/13/24

## 2024-07-14 PROCEDURE — 99232 SBSQ HOSP IP/OBS MODERATE 35: CPT | Performed by: NURSE PRACTITIONER

## 2024-07-14 RX ADMIN — NICOTINE POLACRILEX 2 MG: 2 GUM, CHEWING ORAL at 08:50

## 2024-07-14 RX ADMIN — LIDOCAINE HYDROCHLORIDE 15 ML: 20 SOLUTION ORAL at 17:18

## 2024-07-14 RX ADMIN — ESCITALOPRAM OXALATE 20 MG: 10 TABLET, FILM COATED ORAL at 08:49

## 2024-07-14 RX ADMIN — METFORMIN HYDROCHLORIDE 500 MG: 500 TABLET, FILM COATED ORAL at 17:15

## 2024-07-14 RX ADMIN — MELATONIN TAB 3 MG 3 MG: 3 TAB at 21:24

## 2024-07-14 RX ADMIN — PROPRANOLOL HYDROCHLORIDE 10 MG: 10 TABLET ORAL at 08:50

## 2024-07-14 RX ADMIN — GLYCOPYRROLATE 1 MG: 1 TABLET ORAL at 17:15

## 2024-07-14 RX ADMIN — NICOTINE POLACRILEX 2 MG: 2 GUM, CHEWING ORAL at 17:15

## 2024-07-14 RX ADMIN — LORATADINE 10 MG: 10 TABLET ORAL at 08:51

## 2024-07-14 RX ADMIN — FAMOTIDINE 20 MG: 20 TABLET ORAL at 17:15

## 2024-07-14 RX ADMIN — LIDOCAINE HYDROCHLORIDE 15 ML: 20 SOLUTION ORAL at 21:38

## 2024-07-14 RX ADMIN — CYANOCOBALAMIN TAB 1000 MCG 1000 MCG: 1000 TAB at 08:50

## 2024-07-14 RX ADMIN — HYDROCHLOROTHIAZIDE 12.5 MG: 12.5 TABLET ORAL at 08:50

## 2024-07-14 RX ADMIN — PROPRANOLOL HYDROCHLORIDE 10 MG: 10 TABLET ORAL at 21:24

## 2024-07-14 RX ADMIN — METFORMIN HYDROCHLORIDE 500 MG: 500 TABLET, FILM COATED ORAL at 08:50

## 2024-07-14 RX ADMIN — FAMOTIDINE 20 MG: 20 TABLET ORAL at 08:51

## 2024-07-14 RX ADMIN — SENNOSIDES AND DOCUSATE SODIUM 2 TABLET: 8.6; 5 TABLET ORAL at 08:49

## 2024-07-14 RX ADMIN — POLYETHYLENE GLYCOL 3350 17 G: 17 POWDER, FOR SOLUTION ORAL at 08:49

## 2024-07-14 RX ADMIN — LIDOCAINE HYDROCHLORIDE 15 ML: 20 SOLUTION ORAL at 12:25

## 2024-07-14 RX ADMIN — CLOZAPINE 500 MG: 100 TABLET ORAL at 21:24

## 2024-07-14 RX ADMIN — PANTOPRAZOLE SODIUM 20 MG: 20 TABLET, DELAYED RELEASE ORAL at 06:29

## 2024-07-14 RX ADMIN — GLYCOPYRROLATE 1 MG: 1 TABLET ORAL at 21:24

## 2024-07-14 RX ADMIN — FLUTICASONE PROPIONATE 1 SPRAY: 50 SPRAY, METERED NASAL at 08:51

## 2024-07-14 RX ADMIN — GLYCOPYRROLATE 1 MG: 1 TABLET ORAL at 08:51

## 2024-07-14 RX ADMIN — NICOTINE POLACRILEX 2 MG: 2 GUM, CHEWING ORAL at 12:50

## 2024-07-14 RX ADMIN — ARIPIPRAZOLE 15 MG: 15 TABLET ORAL at 08:49

## 2024-07-14 RX ADMIN — LIDOCAINE HYDROCHLORIDE 15 ML: 20 SOLUTION ORAL at 09:20

## 2024-07-14 RX ADMIN — AMLODIPINE BESYLATE 5 MG: 5 TABLET ORAL at 08:50

## 2024-07-14 RX ADMIN — NICOTINE POLACRILEX 2 MG: 2 GUM, CHEWING ORAL at 21:24

## 2024-07-14 RX ADMIN — SENNOSIDES AND DOCUSATE SODIUM 2 TABLET: 8.6; 5 TABLET ORAL at 17:15

## 2024-07-14 NOTE — NURSING NOTE
"Alberto was visible, calm and cooperative. He took prn swish and spit for the sore in his mouth x2 tonight. He stated the AH continue and \"are the same\" and he gave a gesture. He tried to talk but there was a problematic patient who made it impossible to do so.  He realized that whenever we tried to talk she became very loud and screamed so he said \"maybe we can catch up tomorrow\"  "

## 2024-07-14 NOTE — PLAN OF CARE
Problem: Alteration in Thoughts and Perception  Goal: Verbalize thoughts and feelings  Description: Interventions:  - Promote a nonjudgmental and trusting relationship with the patient through active listening and therapeutic communication  - Assess patient's level of functioning, behavior and potential for risk  - Engage patient in 1 on 1 interactions  - Encourage patient to express fears, feelings, frustrations, and discuss symptoms    - [place] patient to reality, help patient recognize reality-based thinking   - Administer medications as ordered and assess for potential side effects  - Provide the patient education related to the signs and symptoms of the illness and desired effects of prescribed medications  Outcome: Progressing  Goal: Refrain from acting on delusional thinking/internal stimuli  Description: Interventions:  - Monitor patient closely, per order   - Utilize least restrictive measures   - Set reasonable limits, give positive feedback for acceptable   - Administer medications as ordered and monitor of potential side effects  Outcome: Progressing  Goal: Agree to be compliant with medication regime, as prescribed and report medication side effects  Description: Interventions:  - Offer appropriate PRN medication and supervise ingestion; conduct AIMS, as needed   Outcome: Progressing  Goal: Attend and participate in unit activities, including therapeutic, recreational, and educational groups  Description: Interventions:  -Encourage Visitation and family involvement in care  Outcome: Progressing     Problem: Ineffective Coping  Goal: Identifies ineffective coping skills  Outcome: Progressing  Goal: Identifies healthy coping skills  Outcome: Progressing  Goal: Demonstrates healthy coping skills  Outcome: Progressing     Problem: Depression  Goal: Refrain from harming self  Description: Interventions:  - Monitor patient closely, per order   - Supervise medication ingestion, monitor effects and side  effects   Outcome: Progressing  Goal: Refrain from isolation  Description: Interventions:  - Develop a trusting relationship   - Encourage socialization   Outcome: Progressing     Problem: Anxiety  Goal: Anxiety is at manageable level  Description: Interventions:  - Assess and monitor patient's anxiety level.   - Monitor for signs and symptoms (heart palpitations, chest pain, shortness of breath, headaches, nausea, feeling jumpy, restlessness, irritable, apprehensive).   - Collaborate with interdisciplinary team and initiate plan and interventions as ordered.  - [place] patient to unit/surroundings  - Explain treatment plan  - Encourage participation in care  - Encourage verbalization of concerns/fears  - Identify coping mechanisms  - Assist in developing anxiety-reducing skills  - Administer/offer alternative therapies  - Limit or eliminate stimulants  Outcome: Progressing     Problem: Alteration in Thoughts and Perception  Goal: Treatment Goal: Gain control of psychotic behaviors/thinking, reduce/eliminate presenting symptoms and demonstrate improved reality functioning upon discharge  Outcome: Not Progressing     Problem: Ineffective Coping  Goal: Participates in unit activities  Description: Interventions:  - Provide therapeutic environment   - Provide required programming   - Redirect inappropriate behaviors   Outcome: Not Progressing     Problem: Depression  Goal: Treatment Goal: Demonstrate behavioral control of depressive symptoms, verbalize feelings of improved mood/affect, and adopt new coping skills prior to discharge  Outcome: Not Progressing

## 2024-07-14 NOTE — PROGRESS NOTES
Progress Note - Behavioral Health     Alberto Berumen 28 y.o. male MRN: [id number]   [facility] Encounter: [id number]      Documentation, nursing notes, medication reconciliation, labs, and vitals reviewed. Patient was seen for continuing care and reviewed with treatment team. No acute events over the past 24 hours.  Per nursing report, he reports auditory hallucinations continue the same.  Frustrated with female peer on the unit acting out last evening.    No medication changes over the past 24 hours. Continues to tolerate current medications with no adverse effects.     On evaluation today, he is seen in the community room and social with select peers.  He continues brighter in affect than when I saw him 2 months ago.  However, he continues to report he continues to feel strongly that there is somebody who means harm to him and his family-auditory hallucinations say the same thing.  Still fearful to return home.  No suicidal ideation, plan, or intent. nd does not exhibit any symptoms of aimee on evaluation.    Sleep: slept off and on  Appetite: fair  Medication side effects: No   ROS: no complaints, all other systems are negative    Mental Status Evaluation:    Appearance:  age appropriate   Behavior:  cooperative   Speech:  slow   Mood:  depressed   Affect:  brighter   Thought Process:  coherent, goal directed   Associations: perseverative   Thought Content:  persecutory delusions   Perceptual Disturbances: auditory hallucinations   Risk Potential: Suicidal ideation - None  Homicidal ideation - None  Potential for aggression - No   Sensorium:  oriented to person, place, and time/date   Memory:  recent and remote memory grossly intact   Consciousness:  alert and awake   Attention: decreased concentration and decreased attention span   Insight:  limited   Judgment: limited   Gait/Station: normal gait/station, normal balance   Motor Activity: no abnormal movements     Vital signs in last 24  hours:    Temp:  [97.5 °F (36.4 °C)-97.8 °F (36.6 °C)] 97.5 °F (36.4 °C)  HR:  [] 92  Resp:  [18] 18  BP: (136-154)/(80-82) 154/82    Laboratory results: I have personally reviewed all pertinent laboratory/tests results.      Progress Toward Goals: minimal improvement    Assessment & Plan   Principal Problem:    Schizoaffective disorder, bipolar type (HCC)  Active Problems:    GERD (gastroesophageal reflux disease)    Medical clearance for psychiatric admission    Tobacco abuse    T wave inversion in EKG    Chronic idiopathic constipation    Confluent and reticulate papillomatosis    Primary hypertension    Elevated hemoglobin A1c    Bilateral lower extremity edema    Recommended Treatment:     Planned medication and treatment changes:    All current active medications have been reviewed  Encourage group therapy, milieu therapy and occupational therapy  Behavioral Health checks every 7 minutes    Requires continued inpatient treatment due to chronic illness and high risk of decompensation if discharged before long term stability is achieved.    Continue current medications:  Current Facility-Administered Medications   Medication Dose Route Frequency Provider Last Rate    acetaminophen  650 mg Oral Q4H PRN Jordan C Holter,       acetaminophen  650 mg Oral Q6H PRN HOLLI Lion      aluminum-magnesium hydroxide-simethicone  30 mL Oral Q4H PRN Jordan C Holter, DO      amLODIPine  5 mg Oral Daily HOLLI Lion      ARIPiprazole  15 mg Oral Daily Bora Rosario MD      Artificial Tears  1 drop Both Eyes Q3H PRN Jordan C Holter, DO      atropine  1 drop Sublingual Daily PRN HOLLI Lion      haloperidol lactate  2.5 mg Intramuscular Q4H PRN Max 4/day HOLLI Lion      And    LORazepam  1 mg Intramuscular Q4H PRN Max 4/day HOLLI Lion      And    benztropine  0.5 mg Intramuscular Q4H PRN Max 4/day HOLLI Lion      benztropine  1 mg Intramuscular Q4H PRN Max 6/day Jordan C Holter,  DO      haloperidol lactate  5 mg Intramuscular Q4H PRN Max 4/day HOLLI Lion      And    LORazepam  2 mg Intramuscular Q4H PRN Max 4/day HOLLI Lion      And    benztropine  1 mg Intramuscular Q4H PRN Max 4/day HOLLI Lion      benztropine  1 mg Oral Q4H PRN Max 6/day HOLLI Lion      benztropine  1 mg Oral Q4H PRN Max 6/day Jordan C Holter, DO      bisacodyl  10 mg Rectal Daily PRN HOLLI Lion      calcium carbonate  500 mg Oral BID PRN HOLLI Monge      cloZAPine  500 mg Oral HS Bora Rosario MD      cyanocobalamin  1,000 mcg Oral Daily HOLLI Galvan      hydrOXYzine HCL  50 mg Oral Q6H PRN Max 4/day Jordan C Holter, DO      Or    diphenhydrAMINE  50 mg Intramuscular Q6H PRN Jordan C Holter, DO      hydrOXYzine HCL  50 mg Oral Q6H PRN Max 4/day HOLLI Lion      Or    diphenhydrAMINE  50 mg Intramuscular Q6H PRN HOLLI Lion      diphenhydrAMINE-zinc acetate   Topical BID PRN HOLLI Lion      escitalopram  20 mg Oral Daily HOLLI Lion      famotidine  20 mg Oral BID HOLLI Monge      fluticasone  1 spray Each Nare Daily Brina Guillen MD      glycopyrrolate  1 mg Oral TID Bora Rosario MD      haloperidol  1 mg Oral Q6H PRN HOLLI Lion      haloperidol  2.5 mg Oral Q4H PRN Max 4/day HOLLI Lion      haloperidol  5 mg Oral Q4H PRN Max 4/day HOLLI Lion      hydroCHLOROthiazide  12.5 mg Oral Daily HOLLI Galvan      hydrocortisone   Topical 4x Daily PRN HOLLI Lion      hydrOXYzine HCL  100 mg Oral Q6H PRN Max 4/day Jordan C Holter, DO      Or    LORazepam  2 mg Intramuscular Q6H PRN Jordan C Holter, DO      hydrOXYzine HCL  100 mg Oral Q6H PRN Max 4/day HOLLI Lion      Or    LORazepam  2 mg Intramuscular Q6H PRN HOLLI Lion      hydrOXYzine HCL  25 mg Oral Q6H PRN Max 4/day Jordan C Holter, DO      ibuprofen  600 mg Oral Q8H PRN HOLLI Lion       Lidocaine Viscous HCl  15 mL Swish & Spit 4x Daily PRN HOLLI Monge      loratadine  10 mg Oral Daily Brina Guillen MD      melatonin  3 mg Oral HS [facility] Holter, DO      metFORMIN  500 mg Oral BID With Meals HOLLI Galvan      methocarbamol  500 mg Oral Q6H PRN HOLLI Lion      nicotine polacrilex  2 mg Oral Q4H PRN Bora Rosario MD      OLANZapine  5 mg Oral Q4H PRN Max 3/day [facility] Holter, DO      Or    OLANZapine  2.5 mg Intramuscular Q4H PRN Max 3/day [facility] Holter, DO      OLANZapine  5 mg Oral Q3H PRN Max 3/day [facility] Holter, DO      Or    OLANZapine  5 mg Intramuscular Q3H PRN Max 3/day [facility] Holter, DO      OLANZapine  2.5 mg Oral Q4H PRN Max 6/day [facility] Holter, DO      ondansetron  4 mg Oral Q6H PRN HOLLI Lion      pantoprazole  20 mg Oral Early Morning HOLLI Monge      polyethylene glycol  17 g Oral Daily HOLLI Lion      polyethylene glycol  17 g Oral Daily PRN [facility] Holter, DO      propranolol  10 mg Oral Q12H KAYLIE HOLLI Lion      senna-docusate sodium  1 tablet Oral Daily PRN [facility] Holter, DO      senna-docusate sodium  2 tablet Oral BID HOLLI Lion      traZODone  50 mg Oral HS PRN HOLLI Lion      white petrolatum-mineral oil   Topical TID PRN HOLLI Lion         Risks / Benefits of Treatment:    Risks, benefits, and possible side effects of medications explained to patient and patient verbalizes understanding and agreement for treatment.    Counseling / Coordination of Care:    Patient's progress discussed with staff in treatment team meeting.  Medications, treatment progress and treatment plan reviewed with patient.    HOLLI Anderson 07/14/24

## 2024-07-14 NOTE — NURSING NOTE
Refused weekly weight. Lungs CTA. Bowel sounds present in all quadrants. Last BM today. No edema noted. Skin is intact. C/o flatulence and frequent stools. Patient taking daily Miralax.

## 2024-07-14 NOTE — NURSING NOTE
Patient is calm and cooperative. Visible and social. Ate 100% of breakfast and 25% of lunch. Medication compliant. Attending groups. Continuous rounding maintained.

## 2024-07-15 PROCEDURE — 99232 SBSQ HOSP IP/OBS MODERATE 35: CPT | Performed by: PSYCHIATRY & NEUROLOGY

## 2024-07-15 RX ADMIN — LIDOCAINE HYDROCHLORIDE 15 ML: 20 SOLUTION ORAL at 14:55

## 2024-07-15 RX ADMIN — SENNOSIDES AND DOCUSATE SODIUM 2 TABLET: 8.6; 5 TABLET ORAL at 09:01

## 2024-07-15 RX ADMIN — SENNOSIDES AND DOCUSATE SODIUM 2 TABLET: 8.6; 5 TABLET ORAL at 17:14

## 2024-07-15 RX ADMIN — NICOTINE POLACRILEX 2 MG: 2 GUM, CHEWING ORAL at 17:55

## 2024-07-15 RX ADMIN — LIDOCAINE HYDROCHLORIDE 15 ML: 20 SOLUTION ORAL at 10:46

## 2024-07-15 RX ADMIN — LORATADINE 10 MG: 10 TABLET ORAL at 09:02

## 2024-07-15 RX ADMIN — GLYCOPYRROLATE 1 MG: 1 TABLET ORAL at 09:02

## 2024-07-15 RX ADMIN — GLYCOPYRROLATE 1 MG: 1 TABLET ORAL at 21:25

## 2024-07-15 RX ADMIN — GLYCOPYRROLATE 1 MG: 1 TABLET ORAL at 17:14

## 2024-07-15 RX ADMIN — LIDOCAINE HYDROCHLORIDE 15 ML: 20 SOLUTION ORAL at 17:55

## 2024-07-15 RX ADMIN — ARIPIPRAZOLE 15 MG: 15 TABLET ORAL at 09:01

## 2024-07-15 RX ADMIN — NICOTINE POLACRILEX 2 MG: 2 GUM, CHEWING ORAL at 13:32

## 2024-07-15 RX ADMIN — CYANOCOBALAMIN TAB 1000 MCG 1000 MCG: 1000 TAB at 09:01

## 2024-07-15 RX ADMIN — FAMOTIDINE 20 MG: 20 TABLET ORAL at 17:14

## 2024-07-15 RX ADMIN — PROPRANOLOL HYDROCHLORIDE 10 MG: 10 TABLET ORAL at 09:06

## 2024-07-15 RX ADMIN — METFORMIN HYDROCHLORIDE 500 MG: 500 TABLET, FILM COATED ORAL at 17:14

## 2024-07-15 RX ADMIN — NICOTINE POLACRILEX 2 MG: 2 GUM, CHEWING ORAL at 21:55

## 2024-07-15 RX ADMIN — PROPRANOLOL HYDROCHLORIDE 10 MG: 10 TABLET ORAL at 21:25

## 2024-07-15 RX ADMIN — CLOZAPINE 500 MG: 100 TABLET ORAL at 21:25

## 2024-07-15 RX ADMIN — METFORMIN HYDROCHLORIDE 500 MG: 500 TABLET, FILM COATED ORAL at 09:02

## 2024-07-15 RX ADMIN — NICOTINE POLACRILEX 2 MG: 2 GUM, CHEWING ORAL at 09:08

## 2024-07-15 RX ADMIN — HYDROCHLOROTHIAZIDE 12.5 MG: 12.5 TABLET ORAL at 09:06

## 2024-07-15 RX ADMIN — AMLODIPINE BESYLATE 5 MG: 5 TABLET ORAL at 09:06

## 2024-07-15 RX ADMIN — ESCITALOPRAM OXALATE 20 MG: 10 TABLET, FILM COATED ORAL at 09:01

## 2024-07-15 RX ADMIN — PANTOPRAZOLE SODIUM 20 MG: 20 TABLET, DELAYED RELEASE ORAL at 06:04

## 2024-07-15 RX ADMIN — FAMOTIDINE 20 MG: 20 TABLET ORAL at 09:07

## 2024-07-15 RX ADMIN — LIDOCAINE HYDROCHLORIDE 15 ML: 20 SOLUTION ORAL at 21:24

## 2024-07-15 RX ADMIN — MELATONIN TAB 3 MG 3 MG: 3 TAB at 21:26

## 2024-07-15 NOTE — PROGRESS NOTES
Psychiatry Progress Note [facility]    Alberto Berumen 28 y.o. male MRN: [id number]  Unit/Bed#: [facility] 108-02 Encounter: [id number]  Code Status: Level 1 - Full Code    PCP: Joce Juan MD    Date of Admission:  3/29/2024 2008   Date of Service:  07/15/24    Patient Active Problem List   Diagnosis    GERD (gastroesophageal reflux disease)    Medical clearance for psychiatric admission    Schizoaffective disorder, bipolar type (HCC)    Tobacco abuse    T wave inversion in EKG    Syringoma    Chronic idiopathic constipation    Vitamin B 12 deficiency    Vitamin D deficiency    Confluent and reticulate papillomatosis    Class 2 obesity in adult    Primary hypertension    Elevated hemoglobin A1c    Bilateral lower extremity edema     Review of systems: Still uses lidocaine swish in his mouth for allegedly biting his tongue during ECT last Monday otherwise unremarkable   psychiatric diagnosis: Schizoaffective bipolar  assessment  Overall Status: Status quo with no significant changes  certification Statement: The patient will continue to require additional inpatient hospital stay due to ongoing voices that are threatening to kill him and his family lack of response to medications and ECT so far.     Medications: Clozapine 500 mg at bedtime, propranolol 10 mg every 12 hours as as needed for anxiety, Abilify 15 mg mg at bedtime Lexapro 20 mg once a day, atropine eyedrops sublingual for drooling of saliva and senna 2 tablets twice a day for constipation  All medications reviewed and recommend they be continued for symptom management  side effects from treatment: None reported  Medication changes   None today  Medication education   Risks side effects benefits and precautions of medications discussed with patient and he did verbalize an understanding about risks for metabolic syndrome from being on neuroleptics and is form tardive dyskinesia etc. and special precautions about being on clozapine    Understanding of medications: Has some understanding   Justification for dual anti-psychotics: Not applicable    Non-pharmacological treatments  Continue with individual, group, milieu and occupational therapy using recovery principles and psycho-education about accepting illness and the need for treatment.   to contact aunt and explore ACT team referral which he never had  ECT to be continued  q 2 weekly for maintenance x 6  Refuses to see a dietician and agrees to do more exercises as he is about 100 lbs overweight   Prolactin level high so Risperdal replaced with Abilify    Safety  Safety and communication plan established to target dynamic risk factors discussed above.    Discharge Plan   Back to his aunt with an ACT team once ECT is completed    Interval Progress   No significant changes hearing same threatening voices to kill him and his family that makes him feel sad and not wanting him to go back home.  He believes someone is out to harm him because of the voices.  They are not commanding but makes him feel sad and cause him to get passive death wishes with no active intent or plans.  Still believes ECTs have definitely helped him to stay out of his head with the voices.  No behavioral PRNs needed lately and not aggressive or agitated or threatening and is still somewhat guarded evasive preoccupied requesting more ECTs which he is now getting as every 2 weeks for maintenance.  Hygiene and grooming still not great wearing hospital clothing and has a constricted to flat affect and not too social staying to himself most of the time interacting only with select peers        Acceptance by patient: Accepting  Hopefulness in recovery: Living with aunt and sister  Involved in reintegration process: Talking with aunt and sister  Trusting in relationship with psychiatrist: Trusting  Sleep: Good  Appetite: Good  Compliance with Medications: Good  Group attendance: About half of the groups 5/6 last  Friday  Significant events: Status quo with no significant changes    mental Status Exam  Appearance: age appropriate, improved grooming, looks older than stated age, overweight, with hair in dreadlocks casually dressed with a clean shaven face, fairly groomed with good eye contact continues to wear hospital walk called aloof manner  behavior: cooperative, mildly anxious, evasive, gesturing, slow responses.   speech: normal rate, normal volume, normal pitch  Mood: dysphoric, anxious, continues to report feeling good with the ECT   affect: constricted, inappropriate, mood-congruent remains fearful and anxious with a flat affect   thought Process: organized, logical, coherent, goal directed, linear, decreased rate of thoughts, slowing of thoughts, negative thinking, impaired abstract reasoning, concrete  Thought Content: paranoid ideation, some paranoia, grandiose ideas, intrusive thoughts, preoccupied, chronic, continues to report paranoia about people threatening to kill him and his family because of the voices.  He believes ECT has helped so that he is not much in his head.  No other delusions elicited.  No current suicidal or homicidal thoughts and no plans verbalized but today reports having passive death wishes because of voices with no plans and able to CFS and it has not changed yet  .  No phobias obsessions compulsions or distorted body perceptions reported.  Preoccupied with wanting to get ECTs more often despite reminding him that it may impair his memory and finally he agreed to such as it is  every 2 weeks  Perceptual Disturbances: Still hears same threatening voices as before not commanding  Hx Risk Factors: chronic psychiatric problems, chronic anxiety symptoms, chronic psychotic symptoms,    Sensorium: Oriented x 3 spheres and situation  Cognition: recent and remote memory grossly intact  Consciousness: alert and awake  Attention: attention span and concentration are age appropriate  Intellect: appears  to be of average intelligence  Insight: intact  Judgement: intact  Motor Activity: no abnormal movements     Vitals  Temp:  [97.5 °F (36.4 °C)-97.8 °F (36.6 °C)] 97.8 °F (36.6 °C)  HR:  [92-99] 98  Resp:  [18] 18  BP: (127-155)/(73-89) 127/73  SpO2:  [99 %] 99 %  No intake or output data in the 24 hours ending 07/15/24 0506              Lab Results: All Labs For Current Hospital Admission Reviewed     Current Facility-Administered Medications   Medication Dose Route Frequency Provider Last Rate    acetaminophen  650 mg Oral Q4H PRN Jordan C Holter, DO      acetaminophen  650 mg Oral Q6H PRN HOLLI Lion      aluminum-magnesium hydroxide-simethicone  30 mL Oral Q4H PRN Jordan C Holter,       amLODIPine  5 mg Oral Daily STEVE LionNP      ARIPiprazole  15 mg Oral Daily Bora Rosario MD      Artificial Tears  1 drop Both Eyes Q3H PRN Jordan C Holter, DO      atropine  1 drop Sublingual Daily PRN HOLLI Lion      haloperidol lactate  2.5 mg Intramuscular Q4H PRN Max 4/day STEVE LionNP      And    LORazepam  1 mg Intramuscular Q4H PRN Max 4/day HOLLI Lion      And    benztropine  0.5 mg Intramuscular Q4H PRN Max 4/day STEVE LionNP      benztropine  1 mg Intramuscular Q4H PRN Max 6/day Jordan C Holter, DO      haloperidol lactate  5 mg Intramuscular Q4H PRN Max 4/day STEVE LionNP      And    LORazepam  2 mg Intramuscular Q4H PRN Max 4/day STEVE LionNP      And    benztropine  1 mg Intramuscular Q4H PRN Max 4/day HOLLI Lion      benztropine  1 mg Oral Q4H PRN Max 6/day HOLLI Lion      benztropine  1 mg Oral Q4H PRN Max 6/day Jordan C Holter, DO      bisacodyl  10 mg Rectal Daily PRN HOLLI Lion      calcium carbonate  500 mg Oral BID PRN HOLLI Monge      cloZAPine  500 mg Oral HS Bora Rosario MD      cyanocobalamin  1,000 mcg Oral Daily HOLLI Galvan      hydrOXYzine HCL  50 mg Oral Q6H PRN Max 4/day Jordan C Holter,  DO      Or    diphenhydrAMINE  50 mg Intramuscular Q6H PRN Jordan C Holter, DO      hydrOXYzine HCL  50 mg Oral Q6H PRN Max 4/day HOLLI Lion      Or    diphenhydrAMINE  50 mg Intramuscular Q6H PRN HOLLI Lion      diphenhydrAMINE-zinc acetate   Topical BID PRN HOLLI Lion      escitalopram  20 mg Oral Daily HOLLI Lion      famotidine  20 mg Oral BID HOLLI Monge      fluticasone  1 spray Each Nare Daily Brina Guillen MD      glycopyrrolate  1 mg Oral TID Bora Rosario MD      haloperidol  1 mg Oral Q6H PRN HOLLI Lion      haloperidol  2.5 mg Oral Q4H PRN Max 4/day HOLLI Lion      haloperidol  5 mg Oral Q4H PRN Max 4/day HOLLI Lion      hydroCHLOROthiazide  12.5 mg Oral Daily HOLLI Galvan      hydrocortisone   Topical 4x Daily PRN HOLLI Lion      hydrOXYzine HCL  100 mg Oral Q6H PRN Max 4/day Jordan C Holter, DO      Or    LORazepam  2 mg Intramuscular Q6H PRN Jordan C Holter, DO      hydrOXYzine HCL  100 mg Oral Q6H PRN Max 4/day HOLLI Lion      Or    LORazepam  2 mg Intramuscular Q6H PRN HOLLI Lion      hydrOXYzine HCL  25 mg Oral Q6H PRN Max 4/day Jordan C Holter, DO      ibuprofen  600 mg Oral Q8H PRN HOLLI Lion      Lidocaine Viscous HCl  15 mL Swish & Spit 4x Daily PRN HOLLI Monge      loratadine  10 mg Oral Daily Brina Guillen MD      melatonin  3 mg Oral HS Jordan C Holter, DO      metFORMIN  500 mg Oral BID With Meals HOLLI Galvan      methocarbamol  500 mg Oral Q6H PRN HOLLI Lion      nicotine polacrilex  2 mg Oral Q4H PRN Bora Rosario MD      OLANZapine  5 mg Oral Q4H PRN Max 3/day Jordan C Holter, DO      Or    OLANZapine  2.5 mg Intramuscular Q4H PRN Max 3/day Jordan C Holter, DO      OLANZapine  5 mg Oral Q3H PRN Max 3/day Ion FISCHER Holter, DO      Or    OLANZapine  5 mg Intramuscular Q3H PRN Max 3/day Ion FISCHER Holter, DO      OLANZapine  2.5 mg Oral Q4H  PRN Max 6/day Jordan C Holter, DO      ondansetron  4 mg Oral Q6H PRN HOLLI Lion      pantoprazole  20 mg Oral Early Morning Eileen GonzalezmiryamjaylenHOLLI      polyethylene glycol  17 g Oral Daily HOLLI Lion      polyethylene glycol  17 g Oral Daily PRN Jordan C Holter, DO      propranolol  10 mg Oral Q12H KAYLIE HOLLI Lion      senna-docusate sodium  1 tablet Oral Daily PRN Jordan C Holter, DO      senna-docusate sodium  2 tablet Oral BID HOLLI Lion      traZODone  50 mg Oral HS PRN HOLLI Lion      white petrolatum-mineral oil   Topical TID PRN HOLLI Lion         Counseling / Coordination of Care: Total floor / unit time spent today 15 minutes. Greater than 50% of total time was spent with the patient and / or family counseling and / or somewhat receptive to supportive listening and teaching positive coping skills to deal with symptom mangement.     Patient's Rights, confidentiality and exceptions to confidentiality, use of automated medical record, Behavioral Health Services staff access to medical record, and consent to treatment reviewed.    This note has been dictated and hence there may be problems with punctuation, spelling and formatting and if anyone has any concerns please address them to Dr. Rosario   This note is not shared with patient due to potential for making patient's condition worse by knowing the content of the note.

## 2024-07-15 NOTE — NURSING NOTE
"Pt visible in the milieu and social with peers. Pt is pleasant, polite, and cooperative. Took HS medications without incidence. PRN lidocaine viscous swish and spit given for tongue pain at 2138. Pt endorses \"the same\" AH. Attended all evening groups. Q7 minute safety checks maintained. No behavioral issues.   "

## 2024-07-15 NOTE — PLAN OF CARE
Problem: Ineffective Coping  Goal: Identifies ineffective coping skills  Outcome: Not Progressing  Goal: Identifies healthy coping skills  Outcome: Progressing  Goal: Demonstrates healthy coping skills  Outcome: Not Progressing  Goal: Participates in unit activities  Description: Interventions:  - Provide therapeutic environment   - Provide required programming   - Redirect inappropriate behaviors   Outcome: Progressing   Attended 71% of the groups offered last week.

## 2024-07-15 NOTE — PLAN OF CARE
Problem: Alteration in Thoughts and Perception  Goal: Verbalize thoughts and feelings  Description: Interventions:  - Promote a nonjudgmental and trusting relationship with the patient through active listening and therapeutic communication  - Assess patient's level of functioning, behavior and potential for risk  - Engage patient in 1 on 1 interactions  - Encourage patient to express fears, feelings, frustrations, and discuss symptoms    - [place] patient to reality, help patient recognize reality-based thinking   - Administer medications as ordered and assess for potential side effects  - Provide the patient education related to the signs and symptoms of the illness and desired effects of prescribed medications  Outcome: Progressing  Goal: Refrain from acting on delusional thinking/internal stimuli  Description: Interventions:  - Monitor patient closely, per order   - Utilize least restrictive measures   - Set reasonable limits, give positive feedback for acceptable   - Administer medications as ordered and monitor of potential side effects  Outcome: Progressing  Goal: Agree to be compliant with medication regime, as prescribed and report medication side effects  Description: Interventions:  - Offer appropriate PRN medication and supervise ingestion; conduct AIMS, as needed   Outcome: Progressing  Goal: Attend and participate in unit activities, including therapeutic, recreational, and educational groups  Description: Interventions:  -Encourage Visitation and family involvement in care  Outcome: Progressing  Goal: Recognize dysfunctional thoughts, communicate reality-based thoughts at the time of discharge  Description: Interventions:  - Provide medication and psycho-education to assist patient in compliance and developing insight into his/her illness   Outcome: Progressing  Goal: Complete daily ADLs, including personal hygiene independently, as able  Description: Interventions:  - Observe, teach, and assist  patient with ADLS  - Monitor and promote a balance of rest/activity, with adequate nutrition and elimination   Outcome: Progressing     Problem: Ineffective Coping  Goal: Identifies ineffective coping skills  Outcome: Progressing  Goal: Identifies healthy coping skills  Outcome: Progressing  Goal: Demonstrates healthy coping skills  Outcome: Progressing  Goal: Participates in unit activities  Description: Interventions:  - Provide therapeutic environment   - Provide required programming   - Redirect inappropriate behaviors   Outcome: Progressing     Problem: Depression  Goal: Treatment Goal: Demonstrate behavioral control of depressive symptoms, verbalize feelings of improved mood/affect, and adopt new coping skills prior to discharge  Outcome: Progressing  Goal: Verbalize thoughts and feelings  Description: Interventions:  - Assess and re-assess patient's level of risk   - Engage patient in 1:1 interactions, daily, for a minimum of 15 minutes   - Encourage patient to express feelings, fears, frustrations, hopes   Outcome: Progressing  Goal: Refrain from isolation  Description: Interventions:  - Develop a trusting relationship   - Encourage socialization   Outcome: Progressing  Goal: Complete daily ADLs, including personal hygiene independently, as able  Description: Interventions:  - Observe, teach, and assist patient with ADLS  -  Monitor and promote a balance of rest/activity, with adequate nutrition and elimination   Outcome: Progressing     Problem: Anxiety  Goal: Anxiety is at manageable level  Description: Interventions:  - Assess and monitor patient's anxiety level.   - Monitor for signs and symptoms (heart palpitations, chest pain, shortness of breath, headaches, nausea, feeling jumpy, restlessness, irritable, apprehensive).   - Collaborate with interdisciplinary team and initiate plan and interventions as ordered.  - [place] patient to unit/surroundings  - Explain treatment plan  - Encourage participation  in care  - Encourage verbalization of concerns/fears  - Identify coping mechanisms  - Assist in developing anxiety-reducing skills  - Administer/offer alternative therapies  - Limit or eliminate stimulants  Outcome: Progressing     Problem: Alteration in Thoughts and Perception  Goal: Treatment Goal: Gain control of psychotic behaviors/thinking, reduce/eliminate presenting symptoms and demonstrate improved reality functioning upon discharge  Outcome: Not Progressing

## 2024-07-15 NOTE — NURSING NOTE
Patient is blunted, calm, and cooperative. Visible and social. Refused breakfast r/t sleeping. Ate 100% of lunch. Medication compliant. Attending most groups. Needed redirection from brushing female peer's hair and rubbing her shoulders. Patient verbalized understanding. Continuous rounding maintained.

## 2024-07-15 NOTE — PROGRESS NOTES
07/15/24 0719   Team Meeting   Meeting Type Daily Rounds   Team Members Present   Team Members Present Physician;Nurse;;Other (Discipline and Name)   Patient/Family Present   Patient Present No   Patient's Family Present No     In attendance:  Dr. Alex Thomas, MD Dr. Jordan Holter, DO Arin Travis, RN  Luisana Alvarado, \[facility] [place] [facility]\""W  Carla Lux, \[facility] [place] [facility]\""W  Irais Mcdowell, M.S.    Groups: 5/6    Pt has reduced appetite. Pt using Lidocaine swish for tongue pain. Pt reports AH are ongoing and the same. No bx issues noted.

## 2024-07-16 PROCEDURE — 93005 ELECTROCARDIOGRAM TRACING: CPT

## 2024-07-16 PROCEDURE — 99232 SBSQ HOSP IP/OBS MODERATE 35: CPT | Performed by: PSYCHIATRY & NEUROLOGY

## 2024-07-16 RX ADMIN — FLUTICASONE PROPIONATE 1 SPRAY: 50 SPRAY, METERED NASAL at 08:44

## 2024-07-16 RX ADMIN — FAMOTIDINE 20 MG: 20 TABLET ORAL at 17:20

## 2024-07-16 RX ADMIN — NICOTINE POLACRILEX 2 MG: 2 GUM, CHEWING ORAL at 08:44

## 2024-07-16 RX ADMIN — ARIPIPRAZOLE 15 MG: 15 TABLET ORAL at 08:43

## 2024-07-16 RX ADMIN — PANTOPRAZOLE SODIUM 20 MG: 20 TABLET, DELAYED RELEASE ORAL at 06:27

## 2024-07-16 RX ADMIN — PROPRANOLOL HYDROCHLORIDE 10 MG: 10 TABLET ORAL at 21:30

## 2024-07-16 RX ADMIN — GLYCOPYRROLATE 1 MG: 1 TABLET ORAL at 17:21

## 2024-07-16 RX ADMIN — METFORMIN HYDROCHLORIDE 500 MG: 500 TABLET, FILM COATED ORAL at 17:21

## 2024-07-16 RX ADMIN — LORATADINE 10 MG: 10 TABLET ORAL at 08:43

## 2024-07-16 RX ADMIN — SENNOSIDES AND DOCUSATE SODIUM 2 TABLET: 8.6; 5 TABLET ORAL at 17:20

## 2024-07-16 RX ADMIN — AMLODIPINE BESYLATE 5 MG: 5 TABLET ORAL at 08:43

## 2024-07-16 RX ADMIN — LIDOCAINE HYDROCHLORIDE 15 ML: 20 SOLUTION ORAL at 19:56

## 2024-07-16 RX ADMIN — PROPRANOLOL HYDROCHLORIDE 10 MG: 10 TABLET ORAL at 08:44

## 2024-07-16 RX ADMIN — CLOZAPINE 500 MG: 100 TABLET ORAL at 21:30

## 2024-07-16 RX ADMIN — GLYCOPYRROLATE 1 MG: 1 TABLET ORAL at 08:44

## 2024-07-16 RX ADMIN — HYDROCHLOROTHIAZIDE 12.5 MG: 12.5 TABLET ORAL at 08:43

## 2024-07-16 RX ADMIN — ESCITALOPRAM OXALATE 20 MG: 10 TABLET, FILM COATED ORAL at 08:43

## 2024-07-16 RX ADMIN — NICOTINE POLACRILEX 2 MG: 2 GUM, CHEWING ORAL at 21:31

## 2024-07-16 RX ADMIN — FAMOTIDINE 20 MG: 20 TABLET ORAL at 08:44

## 2024-07-16 RX ADMIN — LIDOCAINE HYDROCHLORIDE 15 ML: 20 SOLUTION ORAL at 14:41

## 2024-07-16 RX ADMIN — METFORMIN HYDROCHLORIDE 500 MG: 500 TABLET, FILM COATED ORAL at 08:44

## 2024-07-16 RX ADMIN — CYANOCOBALAMIN TAB 1000 MCG 1000 MCG: 1000 TAB at 08:44

## 2024-07-16 RX ADMIN — SENNOSIDES AND DOCUSATE SODIUM 2 TABLET: 8.6; 5 TABLET ORAL at 08:43

## 2024-07-16 RX ADMIN — GLYCOPYRROLATE 1 MG: 1 TABLET ORAL at 21:30

## 2024-07-16 RX ADMIN — NICOTINE POLACRILEX 2 MG: 2 GUM, CHEWING ORAL at 17:20

## 2024-07-16 RX ADMIN — MELATONIN TAB 3 MG 3 MG: 3 TAB at 21:30

## 2024-07-16 RX ADMIN — LIDOCAINE HYDROCHLORIDE 15 ML: 20 SOLUTION ORAL at 08:55

## 2024-07-16 NOTE — PLAN OF CARE
Problem: Alteration in Thoughts and Perception  Goal: Treatment Goal: Gain control of psychotic behaviors/thinking, reduce/eliminate presenting symptoms and demonstrate improved reality functioning upon discharge  Outcome: Progressing  Goal: Verbalize thoughts and feelings  Description: Interventions:  - Promote a nonjudgmental and trusting relationship with the patient through active listening and therapeutic communication  - Assess patient's level of functioning, behavior and potential for risk  - Engage patient in 1 on 1 interactions  - Encourage patient to express fears, feelings, frustrations, and discuss symptoms    - [place] patient to reality, help patient recognize reality-based thinking   - Administer medications as ordered and assess for potential side effects  - Provide the patient education related to the signs and symptoms of the illness and desired effects of prescribed medications  Outcome: Progressing  Goal: Refrain from acting on delusional thinking/internal stimuli  Description: Interventions:  - Monitor patient closely, per order   - Utilize least restrictive measures   - Set reasonable limits, give positive feedback for acceptable   - Administer medications as ordered and monitor of potential side effects  Outcome: Progressing  Goal: Agree to be compliant with medication regime, as prescribed and report medication side effects  Description: Interventions:  - Offer appropriate PRN medication and supervise ingestion; conduct AIMS, as needed   Outcome: Progressing  Goal: Attend and participate in unit activities, including therapeutic, recreational, and educational groups  Description: Interventions:  -Encourage Visitation and family involvement in care  Outcome: Progressing     Problem: Alteration in Orientation  Goal: Interact with staff daily  Description: Interventions:  - Assess and re-assess patient's level of orientation  - Engage patient in 1 on 1 interactions, daily, for a minimum of 15  minutes   - Establish rapport/trust with patient   Outcome: Progressing  Goal: Complete daily ADLs, including personal hygiene independently, as able  Description: Interventions:  - Observe, teach, and assist patient with ADLS  Outcome: Progressing     Problem: Electroconvulsive therapy (ECT)  Goal: Treatment Goal: Demonstrate a reduction of confusion and improved orientation to person, place, time and/or situation upon discharge, according to optimum baseline/ability  Outcome: Progressing  Goal: Verbalizes/displays adequate comfort level or baseline comfort level  Description: Interventions:  - Encourage patient to monitor pain and request assistance  - Assess pain using appropriate pain scale  - Administer analgesics based on type and severity of pain and evaluate response  - Implement non-pharmacological measures as appropriate and evaluate response  - Consider cultural and social influences on pain and pain management  - Notify physician/advanced practitioner if interventions unsuccessful or patient reports new pain  Outcome: Progressing  Goal: Achieves stable or improved neurological status  Description: INTERVENTIONS  - Monitor and report changes in neurological status  - Monitor vital signs such as temperature, blood pressure, glucose, and any other labs ordered   - Initiate measures to prevent increased intracranial pressure  - Monitor for seizure activity and implement precautions if appropriate      Outcome: Progressing  Goal: Achieves maximal functionality and self care  Description: INTERVENTIONS  - Monitor swallowing and airway patency with patient fatigue and changes in neurological status  - Encourage and assist patient to increase activity and self care.   - Encourage visually impaired, hearing impaired and aphasic patients to use assistive/communication devices  Outcome: Progressing  Goal: Maintain or return mobility to safest level of function  Description: INTERVENTIONS:  - Assess patient's  ability to carry out ADLs; assess patient's baseline for ADL function and identify physical deficits which impact ability to perform ADLs (bathing, care of mouth/teeth, toileting, grooming, dressing, etc.)  - Assess/evaluate cause of self-care deficits   - Assess range of motion  - Assess patient's mobility  - Assess patient's need for assistive devices and provide as appropriate  - Encourage maximum independence but intervene and supervise when necessary  - Involve family in performance of ADLs  - Assess for home care needs following discharge   - Consider OT consult to assist with ADL evaluation and planning for discharge  - Provide patient education as appropriate  Outcome: Progressing  Goal: Absence of urinary retention  Description: INTERVENTIONS:  - Assess patient’s ability to void and empty bladder  - Monitor I/O  - Bladder scan as needed  - Discuss with physician/AP medications to alleviate retention as needed  - Discuss catheterization for long term situations as appropriate  Outcome: Progressing

## 2024-07-16 NOTE — SOCIAL WORK
SW checked in with pt. Pt reports no concerns, ongoing AH and denies other symptoms. Pt reports he's using learned coping skills to minimize effects of the voices.

## 2024-07-16 NOTE — NURSING NOTE
"Patient is visible and social with select peers. Patient appears withdrawn and his mood is flat. He states \" I wish the voices would stop.\" Patient states that they get worse before ect.  He is compliant with medications. Attended most groups.   "

## 2024-07-16 NOTE — PROGRESS NOTES
Psychiatry Progress Note [facility]    Alberto Berumen 28 y.o. male MRN: [id number]  Unit/Bed#: -02 Encounter: [id number]  Code Status: Level 1 - Full Code    PCP: Joce Juan MD    Date of Admission:  3/29/2024 2008   Date of Service:  07/16/24    Patient Active Problem List   Diagnosis    GERD (gastroesophageal reflux disease)    Medical clearance for psychiatric admission    Schizoaffective disorder, bipolar type (HCC)    Tobacco abuse    T wave inversion in EKG    Syringoma    Chronic idiopathic constipation    Vitamin B 12 deficiency    Vitamin D deficiency    Confluent and reticulate papillomatosis    Class 2 obesity in adult    Primary hypertension    Elevated hemoglobin A1c    Bilateral lower extremity edema     Review of systems: Continues to use lidocaine swish in his mouth for listening biting his tongue during ECT last week otherwise unremarkable  psychiatric diagnosis: Schizoaffective bipolar  assessment  Overall Status: Still with same threatening voices otherwise unremarkable  certification Statement: The patient will continue to require additional inpatient hospital stay due to ongoing voices that are threatening to mixing lack of response to medications and ECT so far.     Medications: Clozapine 500 mg at bedtime, propranolol 10 mg every 12 hours as as needed for anxiety, Abilify 15 mg mg at bedtime Lexapro 20 mg once a day, atropine eyedrops sublingual for drooling of saliva and senna 2 tablets twice a day for constipation  All medications reviewed and recommended to be continued for symptom management   side effects from treatment: None reported  Medication changes   None today  Medication education   Risks side effects benefits and precautions of medications discussed with patient and he did verbalize an understanding about risks for metabolic syndrome from being on neuroleptics and is form tardive dyskinesia etc. and special precautions about being on  clozapine   Understanding of medications: Has some understanding   Justification for dual anti-psychotics: Not applicable    Non-pharmacological treatments  Continue with individual, group, milieu and occupational therapy using recovery principles and psycho-education about accepting illness and the need for treatment.   to contact aunt and explore ACT team referral which he never had  ECT to be continued  q 2 weekly for maintenance x 6  Refuses to see a dietician and agrees to do more exercises as he is about 100 lbs overweight   Prolactin level high so Risperdal replaced with Abilify which he has tolerated well so far    Safety  Safety and communication plan established to target dynamic risk factors discussed above.    Discharge Plan   Back to his aunt with an ACT team once ECT is completed    Interval Progress   Continues to hear the same threatening voices to kill him and his family that makes him feel sad and not wanting him to go back home but he believes it is going to get better with ongoing ECTs.  Voices are not commanding.  Still gets passive death wishes with no active intent or plans.  Does interact with select peers and is usually comes across as having a constricted to flat affect wearing hospital clothing with no need for behavioral PRNs lately and not aggressive or agitated or threatening.  Remains somewhat guarded and evasive preoccupied.  Hygiene and grooming still not good.  Compliant with medications   Acceptance by patient: Accepting  Hopefulness in recovery: Living with his aunt and sister  Involved in reintegration process: Talking with family  Trusting in relationship with psychiatrist: Appears to trust  Sleep: Good  Appetite: Good  Compliance with Medications: Good  Group attendance: More than 50% 5/8  Significant events: Status quo with no significant changes    mental Status Exam  Appearance: age appropriate, improved grooming, looks older than stated age, overweight, with  hair in dreadlocks casually dressed with a clean shaven face, fairly groomed with good eye contact still wearing hospital clothing somewhat aloof with me but interacting well with a female peer and found sitting with her in the dining bautista  behavior: The.   speech: normal rate, normal volume, normal pitch  Mood: dysphoric, anxious, continues to report feeling good with the ECT   affect: constricted, inappropriate, mood-congruent mildly anxious with a constricted affect   thought Process: organized, logical, coherent, goal directed, linear, decreased rate of thoughts, slowing of thoughts, negative thinking, impaired abstract reasoning, concrete  Thought Content: paranoid ideation, some paranoia, grandiose ideas, intrusive thoughts, preoccupied, chronic, continues to report paranoia about people threatening to kill him and his family because of the voices.  He believes ECT has helped so that he is not much in his head.  No other delusions elicited.  No current suicidal or homicidal thoughts and no plans verbalized but today reports having passive death wishes because of voices with no plans and able to CFS and it has not changed yet  .  No phobias obsessions compulsions or distorted body perceptions reported.  Preoccupied with wanting to get ECTs more often despite reminding him that it may impair his memory and finally he agreed to such as it is  every 2 weeks  Perceptual Disturbances: Continues to hear some threatening voices not commanding and telling him that they are going to kill him and his family but reports they are less often and he believes ECT has helped to the point that he is not too much in his head  Hx Risk Factors: chronic psychiatric problems, chronic anxiety symptoms, chronic psychotic symptoms,    Sensorium: Oriented x 3 spheres and situation  Cognition: recent and remote memory grossly intact  Consciousness: alert and awake  Attention: attention span and concentration are age  appropriate  Intellect: appears to be of average intelligence  Insight: intact  Judgement: intact  Motor Activity: no abnormal movements     Vitals  Temp:  [97.5 °F (36.4 °C)-97.8 °F (36.6 °C)] 97.8 °F (36.6 °C)  HR:  [] 96  Resp:  [18] 18  BP: (116-147)/() 147/100  SpO2:  [96 %-97 %] 96 %  No intake or output data in the 24 hours ending 07/16/24 5322              Lab Results: All Labs For Current Hospital Admission Reviewed     Current Facility-Administered Medications   Medication Dose Route Frequency Provider Last Rate    acetaminophen  650 mg Oral Q4H PRN Jordan C Holter, DO      acetaminophen  650 mg Oral Q6H PRN HOLLI Lion      aluminum-magnesium hydroxide-simethicone  30 mL Oral Q4H PRN Jordan C Holter, DO      amLODIPine  5 mg Oral Daily STEVE LionNP      ARIPiprazole  15 mg Oral Daily Bora Rosario MD      Artificial Tears  1 drop Both Eyes Q3H PRN Jordan C Holter, DO      atropine  1 drop Sublingual Daily PRN HOLLI Lion      haloperidol lactate  2.5 mg Intramuscular Q4H PRN Max 4/day STEVE LionNP      And    LORazepam  1 mg Intramuscular Q4H PRN Max 4/day STEVE LionNP      And    benztropine  0.5 mg Intramuscular Q4H PRN Max 4/day HOLLI Lion      benztropine  1 mg Intramuscular Q4H PRN Max 6/day Jordan C Holter, DO      haloperidol lactate  5 mg Intramuscular Q4H PRN Max 4/day STEVE LionNP      And    LORazepam  2 mg Intramuscular Q4H PRN Max 4/day HOLLI Lion      And    benztropine  1 mg Intramuscular Q4H PRN Max 4/day HOLLI Lion      benztropine  1 mg Oral Q4H PRN Max 6/day HOLLI Lion      benztropine  1 mg Oral Q4H PRN Max 6/day Jordan C Holter, DO      bisacodyl  10 mg Rectal Daily PRN HOLLI Lion      calcium carbonate  500 mg Oral BID PRN HOLLI Monge      cloZAPine  500 mg Oral HS Bora Rosario MD      cyanocobalamin  1,000 mcg Oral Daily HOLLI Galvan      hydrOXYzine HCL  50 mg  Oral Q6H PRN Max 4/day Jordan C Holter, DO      Or    diphenhydrAMINE  50 mg Intramuscular Q6H PRN Jordan C Holter, DO      hydrOXYzine HCL  50 mg Oral Q6H PRN Max 4/day HOLLI Lion      Or    diphenhydrAMINE  50 mg Intramuscular Q6H PRN HOLLI Lion      diphenhydrAMINE-zinc acetate   Topical BID PRN HOLLI Lion      escitalopram  20 mg Oral Daily HOLLI Lion      famotidine  20 mg Oral BID HOLLI Monge      fluticasone  1 spray Each Nare Daily Brina Guillen MD      glycopyrrolate  1 mg Oral TID Bora Rosario MD      haloperidol  1 mg Oral Q6H PRN HOLLI Lion      haloperidol  2.5 mg Oral Q4H PRN Max 4/day HOLLI Lion      haloperidol  5 mg Oral Q4H PRN Max 4/day HOLLI Lion      hydroCHLOROthiazide  12.5 mg Oral Daily HOLLI Galvan      hydrocortisone   Topical 4x Daily PRN HOLLI Lion      hydrOXYzine HCL  100 mg Oral Q6H PRN Max 4/day Jordan C Holter, DO      Or    LORazepam  2 mg Intramuscular Q6H PRN Jordan C Holter, DO      hydrOXYzine HCL  100 mg Oral Q6H PRN Max 4/day HOLLI Lion      Or    LORazepam  2 mg Intramuscular Q6H PRN HOLLI Lion      hydrOXYzine HCL  25 mg Oral Q6H PRN Max 4/day Jordan C Holter, DO      ibuprofen  600 mg Oral Q8H PRN HOLLI Lion      Lidocaine Viscous HCl  15 mL Swish & Spit 4x Daily PRN HOLLI Monge      loratadine  10 mg Oral Daily Brina Guillen MD      melatonin  3 mg Oral HS Jordan C Holter, DO      metFORMIN  500 mg Oral BID With Meals HOLLI Galvan      methocarbamol  500 mg Oral Q6H PRN HOLLI Lion      nicotine polacrilex  2 mg Oral Q4H PRN Bora Rosario MD      OLANZapine  5 mg Oral Q4H PRN Max 3/day Jordan C Holter, DO      Or    OLANZapine  2.5 mg Intramuscular Q4H PRN Max 3/day Ion C Holter, DO      OLANZapine  5 mg Oral Q3H PRN Max 3/day Jordan C Holter, DO      Or    OLANZapine  5 mg Intramuscular Q3H PRN Max 3/day Ion FISCHER  Cresencioter, DO      OLANZapine  2.5 mg Oral Q4H PRN Max 6/day Jordan C Holter, DO      ondansetron  4 mg Oral Q6H PRN HOLLI Lion      pantoprazole  20 mg Oral Early Morning Eileen JensenHOLLI      polyethylene glycol  17 g Oral Daily HOLLI Lion      polyethylene glycol  17 g Oral Daily PRN Jordan C Holter, DO      propranolol  10 mg Oral Q12H KAYLIE HOLLI Lion      senna-docusate sodium  1 tablet Oral Daily PRN Jordan C Holter, DO      senna-docusate sodium  2 tablet Oral BID HOLLI Lion      traZODone  50 mg Oral HS PRN HOLLI Lion      white petrolatum-mineral oil   Topical TID PRN HOLLI Lion         Counseling / Coordination of Care: Total floor / unit time spent today 15 minutes. Greater than 50% of total time was spent with the patient and / or family counseling and / or somewhat receptive to supportive listening and teaching positive coping skills to deal with symptom mangement.     Patient's Rights, confidentiality and exceptions to confidentiality, use of automated medical record, Behavioral Health Services staff access to medical record, and consent to treatment reviewed.    This note has been dictated and hence there may be problems with punctuation, spelling and formatting and if anyone has any concerns please address them to Dr. Rosario   This note is not shared with patient due to potential for making patient's condition worse by knowing the content of the note.     Name band;

## 2024-07-16 NOTE — PROGRESS NOTES
07/16/24 0733   Team Meeting   Meeting Type Daily Rounds   Team Members Present   Team Members Present Physician;Nurse;;Other (Discipline and Name)   Patient/Family Present   Patient Present No   Patient's Family Present No     In attendance:  Dr. Alex Thomas, MD Dr. Jordan Holter, DO Eveline Hunt, HOLLI Taylor, RN  Luisana Alvarado, [facility]  Carla Lux, [facility]  MATILDA Daniel.S.    Groups: 5/8    Pt due for EKG today. Pt visible, inappropriate with female peer but redirectable. Next ECT 7/19.

## 2024-07-16 NOTE — NURSING NOTE
Patient visible and social all evening. Patient requested Lidocaine Viscous mucosal solution at this time.  No other complaints noted. Pleasant with staff. Medication compliant as well. Patient calm and cooperative all shift. No flirtatious behaviors with the female peers after being reminded earlier. Behaviors controlled and appropriate.

## 2024-07-16 NOTE — NURSING NOTE
Patient slept well throughout the night, at least 8 hours, and appears to be resting comfortably. Eyes closed and chest movements noted.

## 2024-07-17 LAB
ATRIAL RATE: 98 BPM
P AXIS: 49 DEGREES
PR INTERVAL: 142 MS
QRS AXIS: 43 DEGREES
QRSD INTERVAL: 92 MS
QT INTERVAL: 354 MS
QTC INTERVAL: 451 MS
T WAVE AXIS: 3 DEGREES
VENTRICULAR RATE: 98 BPM

## 2024-07-17 PROCEDURE — 93010 ELECTROCARDIOGRAM REPORT: CPT | Performed by: STUDENT IN AN ORGANIZED HEALTH CARE EDUCATION/TRAINING PROGRAM

## 2024-07-17 PROCEDURE — 99232 SBSQ HOSP IP/OBS MODERATE 35: CPT | Performed by: PSYCHIATRY & NEUROLOGY

## 2024-07-17 RX ADMIN — NICOTINE POLACRILEX 2 MG: 2 GUM, CHEWING ORAL at 21:14

## 2024-07-17 RX ADMIN — LORATADINE 10 MG: 10 TABLET ORAL at 09:19

## 2024-07-17 RX ADMIN — PROPRANOLOL HYDROCHLORIDE 10 MG: 10 TABLET ORAL at 09:18

## 2024-07-17 RX ADMIN — PROPRANOLOL HYDROCHLORIDE 10 MG: 10 TABLET ORAL at 20:36

## 2024-07-17 RX ADMIN — LIDOCAINE HYDROCHLORIDE 15 ML: 20 SOLUTION ORAL at 17:21

## 2024-07-17 RX ADMIN — FAMOTIDINE 20 MG: 20 TABLET ORAL at 17:21

## 2024-07-17 RX ADMIN — GLYCOPYRROLATE 1 MG: 1 TABLET ORAL at 16:47

## 2024-07-17 RX ADMIN — NICOTINE POLACRILEX 2 MG: 2 GUM, CHEWING ORAL at 09:21

## 2024-07-17 RX ADMIN — GLYCOPYRROLATE 1 MG: 1 TABLET ORAL at 09:13

## 2024-07-17 RX ADMIN — SENNOSIDES AND DOCUSATE SODIUM 2 TABLET: 8.6; 5 TABLET ORAL at 09:13

## 2024-07-17 RX ADMIN — FLUTICASONE PROPIONATE 1 SPRAY: 50 SPRAY, METERED NASAL at 09:13

## 2024-07-17 RX ADMIN — LIDOCAINE HYDROCHLORIDE 15 ML: 20 SOLUTION ORAL at 20:35

## 2024-07-17 RX ADMIN — MELATONIN TAB 3 MG 3 MG: 3 TAB at 21:13

## 2024-07-17 RX ADMIN — ARIPIPRAZOLE 15 MG: 15 TABLET ORAL at 09:19

## 2024-07-17 RX ADMIN — ESCITALOPRAM OXALATE 20 MG: 10 TABLET, FILM COATED ORAL at 09:18

## 2024-07-17 RX ADMIN — GLYCOPYRROLATE 1 MG: 1 TABLET ORAL at 20:36

## 2024-07-17 RX ADMIN — LIDOCAINE HYDROCHLORIDE 15 ML: 20 SOLUTION ORAL at 13:07

## 2024-07-17 RX ADMIN — CYANOCOBALAMIN TAB 1000 MCG 1000 MCG: 1000 TAB at 09:18

## 2024-07-17 RX ADMIN — CLOZAPINE 500 MG: 100 TABLET ORAL at 21:13

## 2024-07-17 RX ADMIN — SENNOSIDES AND DOCUSATE SODIUM 2 TABLET: 8.6; 5 TABLET ORAL at 17:22

## 2024-07-17 RX ADMIN — NICOTINE POLACRILEX 2 MG: 2 GUM, CHEWING ORAL at 17:21

## 2024-07-17 RX ADMIN — METFORMIN HYDROCHLORIDE 500 MG: 500 TABLET, FILM COATED ORAL at 16:47

## 2024-07-17 RX ADMIN — HYDROCHLOROTHIAZIDE 12.5 MG: 12.5 TABLET ORAL at 09:18

## 2024-07-17 RX ADMIN — FAMOTIDINE 20 MG: 20 TABLET ORAL at 09:19

## 2024-07-17 RX ADMIN — METFORMIN HYDROCHLORIDE 500 MG: 500 TABLET, FILM COATED ORAL at 09:18

## 2024-07-17 RX ADMIN — PANTOPRAZOLE SODIUM 20 MG: 20 TABLET, DELAYED RELEASE ORAL at 06:09

## 2024-07-17 NOTE — NURSING NOTE
"Pt is accepting of medications without incidence and meal compliant. Pt is visible in the milieu and social with select few peers. Pt utilizes prn lidocaine swish for tongue stating it \"really helps\". Pt remains flat, and otherwise quiet. Rests in bed or sits in lounge with female peer. Pt reports AH as \"the same\". No new concerns.   "

## 2024-07-17 NOTE — PROGRESS NOTES
07/17/24 0747   Team Meeting   Meeting Type Daily Rounds   Team Members Present   Team Members Present Physician;Nurse;;Other (Discipline and Name)   Patient/Family Present   Patient Present No   Patient's Family Present No     In attendance:  MD Eveline Gamino, HOLLI Taylor, MIGUEL Alvarado, [facility]  MATILDA Daniel.S.    Groups: 6/9    Pt social with select peers. Pt is pleasant; sometimes inappropriate with female peers. No bx issues otherwise.

## 2024-07-17 NOTE — PROGRESS NOTES
Psychiatry Progress Note [facility]    Alberto Berumen 28 y.o. male MRN: [id number]  Unit/Bed#: -02 Encounter: [id number]  Code Status: Level 1 - Full Code    PCP: Joce Juan MD    Date of Admission:  3/29/2024 2008   Date of Service:  07/17/24    Patient Active Problem List   Diagnosis    GERD (gastroesophageal reflux disease)    Medical clearance for psychiatric admission    Schizoaffective disorder, bipolar type (HCC)    Tobacco abuse    T wave inversion in EKG    Syringoma    Chronic idiopathic constipation    Vitamin B 12 deficiency    Vitamin D deficiency    Confluent and reticulate papillomatosis    Class 2 obesity in adult    Primary hypertension    Elevated hemoglobin A1c    Bilateral lower extremity edema     Review of systems: Still uses lidocaine swish in his mouth for allegedly biting his tongue during ECT otherwise unremarkable  psychiatric diagnosis: Schizoaffective bipolar  assessment  Overall Status: Still reporting same threatening voices otherwise unremarkable   certification Statement: The patient will continue to require additional inpatient hospital stay due to ongoing voices that are threatening to mixing lack of response to medications and ECT so far.     Medications: Clozapine 500 mg at bedtime, propranolol 10 mg every 12 hours as as needed for anxiety, Abilify 15 mg mg at bedtime Lexapro 20 mg once a day, atropine eyedrops sublingual for drooling of saliva and senna 2 tablets twice a day for constipation  All medications reviewed and recommended to be continued for symptom management  side effects from treatment: None reported  Medication changes   None today  Medication education   Risks side effects benefits and precautions of medications discussed with patient and he did verbalize an understanding about risks for metabolic syndrome from being on neuroleptics and is form tardive dyskinesia etc. and special precautions about being on clozapine    Understanding of medications: Has some understanding   Justification for dual anti-psychotics: Not applicable    Non-pharmacological treatments  Continue with individual, group, milieu and occupational therapy using recovery principles and psycho-education about accepting illness and the need for treatment.   to contact aunt and explore ACT team referral which he never had  ECT to be continued  q 2 weekly for maintenance x 6  Refuses to see a dietician and agrees to do more exercises as he is about 100 lbs overweight   Prolactin level high so Risperdal replaced with Abilify which he has tolerated well so far    Safety  Safety and communication plan established to target dynamic risk factors discussed above.    Discharge Plan   Back to his aunt with an ACT team once ECT is completed    Interval Progress   Note major changes with same threatening voices to kill him and his family that makes him feel sad but says ECTs have been helping.  Voices and not at all commanding.  Still reports some passive death wishes off and on with no active intent or plans.  Interacting with select peers and comes across as having a constricted to flat affect wearing hospital clothing.  He was counseled about keeping appropriate boundaries with a female peer who was allegedly touching his lips.  The other thing.  Remains somewhat guarded evasive preoccupied.  Hygiene and grooming is still not great.  Compliant with medications   Acceptance by patient: Accepting  Hopefulness in recovery: Living with his aunt and sister  Involved in reintegration process: Talking with family  Trusting in relationship with psychiatrist: Appears to trust  Sleep: Good  Appetite: Good  Compliance with Medications: Good  Group attendance: More than 50% 6/9  Significant events: Status quo with no significant changes    mental Status Exam  Appearance: age appropriate, improved grooming, looks older than stated age, overweight, with hair in dreadlocks  casually dressed with a clean shaven face, fairly groomed with good eye contact found resting on bed under the covers but readily woken up but not interested in talking much l  behavior: Somewhat cold aloof with no good mood reactivity noted to cooperative today  speech: normal rate, normal volume, normal pitch  Mood: dysphoric, anxious, continues to report feeling good with the ECT   affect: constricted, inappropriate, mood-congruent mildly anxious with constricted affect thought Process: organized, logical, coherent, goal directed, linear, decreased rate of thoughts, slowing of thoughts, negative thinking, impaired abstract reasoning, concrete  Thought Content: paranoid ideation, some paranoia, grandiose ideas, intrusive thoughts, preoccupied, chronic, continues to report paranoia about people threatening to kill him and his family because of the voices.  He believes ECT has helped so that he is not much in his head.  No other delusions elicited.  No current suicidal or homicidal thoughts and no plans verbalized but today reports having passive death wishes because of voices with no plans and able to CFS and it has not changed yet  .  No phobias obsessions compulsions or distorted body perceptions reported.  Preoccupied with wanting to get ECTs more often despite reminding him that it may impair his memory and finally he agreed to such as it is  every 2 weeks  Perceptual Disturbances: Still hears same threatening voices that tell him they are going to kill him and his family but he says ECTs as helped and able to contract for safety and he insists voices are not at all commanding  Hx Risk Factors: chronic psychiatric problems, chronic anxiety symptoms, chronic psychotic symptoms,    Sensorium: Oriented x 3 spheres and situation  Cognition: recent and remote memory grossly intact  Consciousness: alert and awake  Attention: attention span and concentration are age appropriate  Intellect: appears to be of average  intelligence  Insight: intact  Judgement: intact  Motor Activity: no abnormal movements     Vitals  Temp:  [97.7 °F (36.5 °C)-98 °F (36.7 °C)] 97.7 °F (36.5 °C)  HR:  [] 105  Resp:  [16-18] 18  BP: (123-143)/(78-98) 143/94  SpO2:  [97 %-100 %] 97 %  No intake or output data in the 24 hours ending 07/17/24 0521              Lab Results: All Labs For Current Hospital Admission Reviewed     Current Facility-Administered Medications   Medication Dose Route Frequency Provider Last Rate    acetaminophen  650 mg Oral Q4H PRN Jordan C Holter, DO      acetaminophen  650 mg Oral Q6H PRN HOLLI Lion      aluminum-magnesium hydroxide-simethicone  30 mL Oral Q4H PRN Jordan C Holter,       amLODIPine  5 mg Oral Daily HOLLI Lion      ARIPiprazole  15 mg Oral Daily Bora Rosario MD      Artificial Tears  1 drop Both Eyes Q3H PRN Jordan C Holter, DO      atropine  1 drop Sublingual Daily PRN HOLLI Lion      haloperidol lactate  2.5 mg Intramuscular Q4H PRN Max 4/day STEVE LionNP      And    LORazepam  1 mg Intramuscular Q4H PRN Max 4/day HOLLI Lion      And    benztropine  0.5 mg Intramuscular Q4H PRN Max 4/day STEVE LionNP      benztropine  1 mg Intramuscular Q4H PRN Max 6/day Jordan C Holter, DO      haloperidol lactate  5 mg Intramuscular Q4H PRN Max 4/day STEVE LionNP      And    LORazepam  2 mg Intramuscular Q4H PRN Max 4/day STEVE LionNP      And    benztropine  1 mg Intramuscular Q4H PRN Max 4/day HOLLI Lion      benztropine  1 mg Oral Q4H PRN Max 6/day HOLLI Lion      benztropine  1 mg Oral Q4H PRN Max 6/day Jordan C Holter,       bisacodyl  10 mg Rectal Daily PRN HOLLI Lion      calcium carbonate  500 mg Oral BID PRN HOLLI Monge      cloZAPine  500 mg Oral HS Bora Rosario MD      cyanocobalamin  1,000 mcg Oral Daily HOLLI Galvan      hydrOXYzine HCL  50 mg Oral Q6H PRN Max 4/day Jordan C Holter, DO       Or    diphenhydrAMINE  50 mg Intramuscular Q6H PRN Ion FISCHER Holter, DO      hydrOXYzine HCL  50 mg Oral Q6H PRN Max 4/day HOLLI Lion      Or    diphenhydrAMINE  50 mg Intramuscular Q6H PRN HOLLI Lion      diphenhydrAMINE-zinc acetate   Topical BID PRN HOLLI Lion      escitalopram  20 mg Oral Daily HOLLI Lion      famotidine  20 mg Oral BID Eileen CherryHOLLI Jimenez      fluticasone  1 spray Each Nare Daily Brina Guillen MD      glycopyrrolate  1 mg Oral TID Bora Rosario MD      haloperidol  1 mg Oral Q6H PRN HOLLI Lion      haloperidol  2.5 mg Oral Q4H PRN Max 4/day HOLLI Lion      haloperidol  5 mg Oral Q4H PRN Max 4/day HOLLI Lion      hydroCHLOROthiazide  12.5 mg Oral Daily HOLLI Galvan      hydrocortisone   Topical 4x Daily PRN HOLLI Lion      hydrOXYzine HCL  100 mg Oral Q6H PRN Max 4/day [facility] Holter, DO      Or    LORazepam  2 mg Intramuscular Q6H PRN [facility] Holter, DO      hydrOXYzine HCL  100 mg Oral Q6H PRN Max 4/day HOLLI Lion      Or    LORazepam  2 mg Intramuscular Q6H PRN HOLLI Lion      hydrOXYzine HCL  25 mg Oral Q6H PRN Max 4/day [facility] Holter, DO      ibuprofen  600 mg Oral Q8H PRN HOLLI Lion      Lidocaine Viscous HCl  15 mL Swish & Spit 4x Daily PRN Bora Rosario MD      loratadine  10 mg Oral Daily Brina Guillen MD      melatonin  3 mg Oral HS Ion  Holter, DO      metFORMIN  500 mg Oral BID With Meals HOLLI Galvan      methocarbamol  500 mg Oral Q6H PRN HOLLI Lion      nicotine polacrilex  2 mg Oral Q4H PRN Bora Rosario MD      OLANZapine  5 mg Oral Q4H PRN Max 3/day [facility] Holter, DO      Or    OLANZapine  2.5 mg Intramuscular Q4H PRN Max 3/day [facility] Holter, DO      OLANZapine  5 mg Oral Q3H PRN Max 3/day Ion C Holter, DO      Or    OLANZapine  5 mg Intramuscular Q3H PRN Max 3/day Jordan C Holter, DO      OLANZapine  2.5 mg Oral Q4H PRN Max 6/day Ion  C Holter, DO      ondansetron  4 mg Oral Q6H PRN HOLLI Lion      pantoprazole  20 mg Oral Early Morning Eileen Gonzalezmiryamjaylen, HOLLI      polyethylene glycol  17 g Oral Daily HOLLI Lion      polyethylene glycol  17 g Oral Daily PRN Jordan C Holter, DO      propranolol  10 mg Oral Q12H [facility] HOLLI Lion      senna-docusate sodium  1 tablet Oral Daily PRN Jordan C Holter, DO      senna-docusate sodium  2 tablet Oral BID HOLLI Lion      traZODone  50 mg Oral HS PRN HOLLI Lion      white petrolatum-mineral oil   Topical TID PRN HOLLI Lion         Counseling / Coordination of Care: Total floor / unit time spent today 15 minutes. Greater than 50% of total time was spent with the patient and / or family counseling and / or somewhat receptive to supportive listening and teaching positive coping skills to deal with symptom mangement.     Patient's Rights, confidentiality and exceptions to confidentiality, use of automated medical record, [facility] staff access to medical record, and consent to treatment reviewed.    This note has been dictated and hence there may be problems with punctuation, spelling and formatting and if anyone has any concerns please address them to Dr. Rosario   This note is not shared with patient due to potential for making patient's condition worse by knowing the content of the note.

## 2024-07-17 NOTE — NURSING NOTE
Pt is visible on the unit and social with select peers. Consumed 100% of dinner. Took medications without incidence. Pt utilized PRN Xylocaine swish and spit at 1721 after dinner and at 2035 before snack and reports it is effective for mouth pain from biting his tongue last week. Pt is pleasant and cooperative. Attended 5/9 groups today. Reports AH that threaten to kill him and his family and denies other psych symptoms. Pt is disheveled. Pt needed redirection for trying to brush and do a female peer's hair and was redirectable. No behavioral issues. Pt offers no concerns or complaints. VSS. Continuous safety checks maintained.

## 2024-07-17 NOTE — PLAN OF CARE
Problem: Alteration in Thoughts and Perception  Goal: Treatment Goal: Gain control of psychotic behaviors/thinking, reduce/eliminate presenting symptoms and demonstrate improved reality functioning upon discharge  Outcome: Progressing  Goal: Verbalize thoughts and feelings  Description: Interventions:  - Promote a nonjudgmental and trusting relationship with the patient through active listening and therapeutic communication  - Assess patient's level of functioning, behavior and potential for risk  - Engage patient in 1 on 1 interactions  - Encourage patient to express fears, feelings, frustrations, and discuss symptoms    - [place] patient to reality, help patient recognize reality-based thinking   - Administer medications as ordered and assess for potential side effects  - Provide the patient education related to the signs and symptoms of the illness and desired effects of prescribed medications  Outcome: Progressing  Goal: Refrain from acting on delusional thinking/internal stimuli  Description: Interventions:  - Monitor patient closely, per order   - Utilize least restrictive measures   - Set reasonable limits, give positive feedback for acceptable   - Administer medications as ordered and monitor of potential side effects  Outcome: Progressing  Goal: Agree to be compliant with medication regime, as prescribed and report medication side effects  Description: Interventions:  - Offer appropriate PRN medication and supervise ingestion; conduct AIMS, as needed   Outcome: Progressing  Goal: Attend and participate in unit activities, including therapeutic, recreational, and educational groups  Description: Interventions:  -Encourage Visitation and family involvement in care  Outcome: Progressing  Goal: Recognize dysfunctional thoughts, communicate reality-based thoughts at the time of discharge  Description: Interventions:  - Provide medication and psycho-education to assist patient in compliance and developing  insight into his/her illness   Outcome: Progressing  Goal: Complete daily ADLs, including personal hygiene independently, as able  Description: Interventions:  - Observe, teach, and assist patient with ADLS  - Monitor and promote a balance of rest/activity, with adequate nutrition and elimination   Outcome: Progressing     Problem: Ineffective Coping  Goal: Identifies ineffective coping skills  Outcome: Progressing  Goal: Identifies healthy coping skills  Outcome: Progressing  Goal: Demonstrates healthy coping skills  Outcome: Progressing  Goal: Participates in unit activities  Description: Interventions:  - Provide therapeutic environment   - Provide required programming   - Redirect inappropriate behaviors   Outcome: Progressing     Problem: Depression  Goal: Treatment Goal: Demonstrate behavioral control of depressive symptoms, verbalize feelings of improved mood/affect, and adopt new coping skills prior to discharge  Outcome: Progressing  Goal: Verbalize thoughts and feelings  Description: Interventions:  - Assess and re-assess patient's level of risk   - Engage patient in 1:1 interactions, daily, for a minimum of 15 minutes   - Encourage patient to express feelings, fears, frustrations, hopes   Outcome: Progressing  Goal: Refrain from harming self  Description: Interventions:  - Monitor patient closely, per order   - Supervise medication ingestion, monitor effects and side effects   Outcome: Progressing  Goal: Refrain from isolation  Description: Interventions:  - Develop a trusting relationship   - Encourage socialization   Outcome: Progressing     Problem: Anxiety  Goal: Anxiety is at manageable level  Description: Interventions:  - Assess and monitor patient's anxiety level.   - Monitor for signs and symptoms (heart palpitations, chest pain, shortness of breath, headaches, nausea, feeling jumpy, restlessness, irritable, apprehensive).   - Collaborate with interdisciplinary team and initiate plan and  interventions as ordered.  - [place] patient to unit/surroundings  - Explain treatment plan  - Encourage participation in care  - Encourage verbalization of concerns/fears  - Identify coping mechanisms  - Assist in developing anxiety-reducing skills  - Administer/offer alternative therapies  - Limit or eliminate stimulants  Outcome: Progressing     Problem: Alteration in Orientation  Goal: Interact with staff daily  Description: Interventions:  - Assess and re-assess patient's level of orientation  - Engage patient in 1 on 1 interactions, daily, for a minimum of 15 minutes   - Establish rapport/trust with patient   Outcome: Progressing  Goal: Allow medical examinations, as recommended  Description: Interventions:  - Provide physical/neurological exams and/or referrals, per provider   Outcome: Progressing  Goal: Cooperate with recommended testing/procedures  Description: Interventions:  - Determine need for ancillary testing  - Observe for mental status changes  - Implement falls/precaution protocol   Outcome: Progressing

## 2024-07-17 NOTE — NURSING NOTE
Alberto continues to socialize with the same group of young peers. He attends about 50 % of groups. His affect continues to be somewhat flat. He still c/o anxiety and depression and AH with voices threatening himself and his family but he does not seem to be responding to internal stimuli.  He is using the Lidocaine Swish and spit QID for mouth discomfort/ tongue pain  He is present in the milieu and cooperative with medications and unit routine

## 2024-07-18 LAB
BASOPHILS # BLD AUTO: 0.06 THOUSANDS/ÂΜL (ref 0–0.1)
BASOPHILS NFR BLD AUTO: 1 % (ref 0–1)
BNP SERPL-MCNC: 17 PG/ML (ref 0–100)
CK SERPL-CCNC: 536 U/L (ref 39–308)
CLOZAPINE SERPL-MCNC: 451 NG/ML (ref 350–900)
CRP SERPL QL: 10.7 MG/L
EOSINOPHIL # BLD AUTO: 0.36 THOUSAND/ÂΜL (ref 0–0.61)
EOSINOPHIL NFR BLD AUTO: 3 % (ref 0–6)
ERYTHROCYTE [DISTWIDTH] IN BLOOD BY AUTOMATED COUNT: 13.7 % (ref 11.6–15.1)
HCT VFR BLD AUTO: 43.7 % (ref 36.5–49.3)
HGB BLD-MCNC: 13 G/DL (ref 12–17)
IMM GRANULOCYTES # BLD AUTO: 0.05 THOUSAND/UL (ref 0–0.2)
IMM GRANULOCYTES NFR BLD AUTO: 0 % (ref 0–2)
LYMPHOCYTES # BLD AUTO: 4.12 THOUSANDS/ÂΜL (ref 0.6–4.47)
LYMPHOCYTES NFR BLD AUTO: 34 % (ref 14–44)
MCH RBC QN AUTO: 23.6 PG (ref 26.8–34.3)
MCHC RBC AUTO-ENTMCNC: 29.7 G/DL (ref 31.4–37.4)
MCV RBC AUTO: 79 FL (ref 82–98)
MONOCYTES # BLD AUTO: 0.95 THOUSAND/ÂΜL (ref 0.17–1.22)
MONOCYTES NFR BLD AUTO: 8 % (ref 4–12)
NEUTROPHILS # BLD AUTO: 6.46 THOUSANDS/ÂΜL (ref 1.85–7.62)
NEUTS SEG NFR BLD AUTO: 54 % (ref 43–75)
NRBC BLD AUTO-RTO: 0 /100 WBCS
PLATELET # BLD AUTO: 265 THOUSANDS/UL (ref 149–390)
PMV BLD AUTO: 11.2 FL (ref 8.9–12.7)
RBC # BLD AUTO: 5.52 MILLION/UL (ref 3.88–5.62)
WBC # BLD AUTO: 12 THOUSAND/UL (ref 4.31–10.16)

## 2024-07-18 PROCEDURE — 99232 SBSQ HOSP IP/OBS MODERATE 35: CPT | Performed by: PSYCHIATRY & NEUROLOGY

## 2024-07-18 PROCEDURE — 83880 ASSAY OF NATRIURETIC PEPTIDE: CPT

## 2024-07-18 PROCEDURE — 80159 DRUG ASSAY CLOZAPINE: CPT | Performed by: PSYCHIATRY & NEUROLOGY

## 2024-07-18 PROCEDURE — 85025 COMPLETE CBC W/AUTO DIFF WBC: CPT

## 2024-07-18 PROCEDURE — 86140 C-REACTIVE PROTEIN: CPT

## 2024-07-18 PROCEDURE — 82550 ASSAY OF CK (CPK): CPT

## 2024-07-18 RX ORDER — SODIUM CHLORIDE, SODIUM LACTATE, POTASSIUM CHLORIDE, CALCIUM CHLORIDE 600; 310; 30; 20 MG/100ML; MG/100ML; MG/100ML; MG/100ML
125 INJECTION, SOLUTION INTRAVENOUS CONTINUOUS
OUTPATIENT
Start: 2024-07-18

## 2024-07-18 RX ORDER — SODIUM CHLORIDE, SODIUM LACTATE, POTASSIUM CHLORIDE, CALCIUM CHLORIDE 600; 310; 30; 20 MG/100ML; MG/100ML; MG/100ML; MG/100ML
50 INJECTION, SOLUTION INTRAVENOUS CONTINUOUS
OUTPATIENT
Start: 2024-07-18

## 2024-07-18 RX ADMIN — HYDROCHLOROTHIAZIDE 12.5 MG: 12.5 TABLET ORAL at 08:34

## 2024-07-18 RX ADMIN — FAMOTIDINE 20 MG: 20 TABLET ORAL at 17:19

## 2024-07-18 RX ADMIN — FAMOTIDINE 20 MG: 20 TABLET ORAL at 08:33

## 2024-07-18 RX ADMIN — NICOTINE POLACRILEX 2 MG: 2 GUM, CHEWING ORAL at 08:33

## 2024-07-18 RX ADMIN — ARIPIPRAZOLE 15 MG: 15 TABLET ORAL at 08:33

## 2024-07-18 RX ADMIN — METFORMIN HYDROCHLORIDE 500 MG: 500 TABLET, FILM COATED ORAL at 17:19

## 2024-07-18 RX ADMIN — SENNOSIDES AND DOCUSATE SODIUM 2 TABLET: 8.6; 5 TABLET ORAL at 17:18

## 2024-07-18 RX ADMIN — MELATONIN TAB 3 MG 3 MG: 3 TAB at 21:21

## 2024-07-18 RX ADMIN — NICOTINE POLACRILEX 2 MG: 2 GUM, CHEWING ORAL at 21:21

## 2024-07-18 RX ADMIN — GLYCOPYRROLATE 1 MG: 1 TABLET ORAL at 21:21

## 2024-07-18 RX ADMIN — GLYCOPYRROLATE 1 MG: 1 TABLET ORAL at 17:18

## 2024-07-18 RX ADMIN — LORATADINE 10 MG: 10 TABLET ORAL at 08:33

## 2024-07-18 RX ADMIN — PROPRANOLOL HYDROCHLORIDE 10 MG: 10 TABLET ORAL at 08:34

## 2024-07-18 RX ADMIN — NICOTINE POLACRILEX 2 MG: 2 GUM, CHEWING ORAL at 17:18

## 2024-07-18 RX ADMIN — SENNOSIDES AND DOCUSATE SODIUM 2 TABLET: 8.6; 5 TABLET ORAL at 08:33

## 2024-07-18 RX ADMIN — LIDOCAINE HYDROCHLORIDE 15 ML: 20 SOLUTION ORAL at 14:16

## 2024-07-18 RX ADMIN — CLOZAPINE 500 MG: 100 TABLET ORAL at 21:21

## 2024-07-18 RX ADMIN — PANTOPRAZOLE SODIUM 20 MG: 20 TABLET, DELAYED RELEASE ORAL at 06:18

## 2024-07-18 RX ADMIN — PROPRANOLOL HYDROCHLORIDE 10 MG: 10 TABLET ORAL at 21:21

## 2024-07-18 RX ADMIN — LIDOCAINE HYDROCHLORIDE 15 ML: 20 SOLUTION ORAL at 09:34

## 2024-07-18 RX ADMIN — CYANOCOBALAMIN TAB 1000 MCG 1000 MCG: 1000 TAB at 08:33

## 2024-07-18 RX ADMIN — LIDOCAINE HYDROCHLORIDE 15 ML: 20 SOLUTION ORAL at 19:09

## 2024-07-18 RX ADMIN — METFORMIN HYDROCHLORIDE 500 MG: 500 TABLET, FILM COATED ORAL at 08:33

## 2024-07-18 RX ADMIN — GLYCOPYRROLATE 1 MG: 1 TABLET ORAL at 08:33

## 2024-07-18 RX ADMIN — AMLODIPINE BESYLATE 5 MG: 5 TABLET ORAL at 08:33

## 2024-07-18 RX ADMIN — ESCITALOPRAM OXALATE 20 MG: 10 TABLET, FILM COATED ORAL at 08:33

## 2024-07-18 NOTE — NURSING NOTE
Received pt in bed at change of shift with eyes closed; chest movement noted.  Continues the same thus this far as per q 7 min room checks.   Will continue to monitor behavior, sleeping pattern and any medical issues that may arise.    0545:  Sleeping 7+ hrs thus this far

## 2024-07-18 NOTE — PROGRESS NOTES
07/18/24 0742   Team Meeting   Meeting Type Daily Rounds   Team Members Present   Team Members Present Physician;Nurse;;Other (Discipline and Name)   Patient/Family Present   Patient Present No   Patient's Family Present No     In attendance:  MD Guy Bhakta, RN  Luisana Alvarado, [facility]  Irais Mcdowell M.S.    Groups: 5/9    Pt has ECT tomorrow. Pt is visible and pleasant but remains flat. Pt continues to be inappropriate with female peers at time.

## 2024-07-18 NOTE — NURSING NOTE
"Patient is visible and social on the milieu. He stated this morning that the voices are getting worse. \"I will be all right.\" He states that the auditory hallucinations improve with ECT. Patient received two doses of lidocaine mouth wash. Patient refused his scheduled dose of miralax this am. Patient denies diarrhea however states \"I am going more frequently.\" Patient stated that his tongue is better. Writer assessed mouth. Appears healed. Patient is compliant with medications. Appetite is good. Attended groups.    "

## 2024-07-18 NOTE — PLAN OF CARE
Problem: Alteration in Thoughts and Perception  Goal: Treatment Goal: Gain control of psychotic behaviors/thinking, reduce/eliminate presenting symptoms and demonstrate improved reality functioning upon discharge  Outcome: Progressing  Goal: Verbalize thoughts and feelings  Description: Interventions:  - Promote a nonjudgmental and trusting relationship with the patient through active listening and therapeutic communication  - Assess patient's level of functioning, behavior and potential for risk  - Engage patient in 1 on 1 interactions  - Encourage patient to express fears, feelings, frustrations, and discuss symptoms    - [place] patient to reality, help patient recognize reality-based thinking   - Administer medications as ordered and assess for potential side effects  - Provide the patient education related to the signs and symptoms of the illness and desired effects of prescribed medications  Outcome: Progressing  Goal: Refrain from acting on delusional thinking/internal stimuli  Description: Interventions:  - Monitor patient closely, per order   - Utilize least restrictive measures   - Set reasonable limits, give positive feedback for acceptable   - Administer medications as ordered and monitor of potential side effects  Outcome: Progressing  Goal: Agree to be compliant with medication regime, as prescribed and report medication side effects  Description: Interventions:  - Offer appropriate PRN medication and supervise ingestion; conduct AIMS, as needed   Outcome: Progressing  Goal: Attend and participate in unit activities, including therapeutic, recreational, and educational groups  Description: Interventions:  -Encourage Visitation and family involvement in care  Outcome: Progressing  Goal: Recognize dysfunctional thoughts, communicate reality-based thoughts at the time of discharge  Description: Interventions:  - Provide medication and psycho-education to assist patient in compliance and developing  insight into his/her illness   Outcome: Progressing  Goal: Complete daily ADLs, including personal hygiene independently, as able  Description: Interventions:  - Observe, teach, and assist patient with ADLS  - Monitor and promote a balance of rest/activity, with adequate nutrition and elimination   Outcome: Progressing     Problem: Ineffective Coping  Goal: Identifies healthy coping skills  Outcome: Progressing  Goal: Demonstrates healthy coping skills  Outcome: Progressing  Goal: Participates in unit activities  Description: Interventions:  - Provide therapeutic environment   - Provide required programming   - Redirect inappropriate behaviors   Outcome: Progressing  Goal: Patient/Family verbalizes awareness of resources  Outcome: Progressing     Problem: Depression  Goal: Treatment Goal: Demonstrate behavioral control of depressive symptoms, verbalize feelings of improved mood/affect, and adopt new coping skills prior to discharge  Outcome: Progressing  Goal: Refrain from harming self  Description: Interventions:  - Monitor patient closely, per order   - Supervise medication ingestion, monitor effects and side effects   Outcome: Progressing  Goal: Refrain from isolation  Description: Interventions:  - Develop a trusting relationship   - Encourage socialization   Outcome: Progressing  Goal: Refrain from self-neglect  Outcome: Progressing  Goal: Complete daily ADLs, including personal hygiene independently, as able  Description: Interventions:  - Observe, teach, and assist patient with ADLS  -  Monitor and promote a balance of rest/activity, with adequate nutrition and elimination   Outcome: Progressing     Problem: Anxiety  Goal: Anxiety is at manageable level  Description: Interventions:  - Assess and monitor patient's anxiety level.   - Monitor for signs and symptoms (heart palpitations, chest pain, shortness of breath, headaches, nausea, feeling jumpy, restlessness, irritable, apprehensive).   - Collaborate with  interdisciplinary team and initiate plan and interventions as ordered.  - [place] patient to unit/surroundings  - Explain treatment plan  - Encourage participation in care  - Encourage verbalization of concerns/fears  - Identify coping mechanisms  - Assist in developing anxiety-reducing skills  - Administer/offer alternative therapies  - Limit or eliminate stimulants  Outcome: Progressing     Problem: Alteration in Orientation  Goal: Treatment Goal: Demonstrate a reduction of confusion and improved orientation to person, place, time and/or situation upon discharge, according to optimum baseline/ability  Outcome: Progressing  Goal: Interact with staff daily  Description: Interventions:  - Assess and re-assess patient's level of orientation  - Engage patient in 1 on 1 interactions, daily, for a minimum of 15 minutes   - Establish rapport/trust with patient   Outcome: Progressing  Goal: Cooperate with recommended testing/procedures  Description: Interventions:  - Determine need for ancillary testing  - Observe for mental status changes  - Implement falls/precaution protocol   Outcome: Progressing     Problem: DISCHARGE PLANNING - CARE MANAGEMENT  Goal: Discharge to post-acute care or home with appropriate resources  Description: INTERVENTIONS:  - Conduct assessment to determine patient/family and health care team treatment goals, and need for post-acute services based on payer coverage, community resources, and patient preferences, and barriers to discharge  - Address psychosocial, clinical, and financial barriers to discharge as identified in assessment in conjunction with the patient/family and health care team  - Arrange appropriate level of post-acute services according to patient’s   needs and preference and payer coverage in collaboration with the physician and health care team  - Communicate with and update the patient/family, physician, and health care team regarding progress on the discharge plan  - Arrange  appropriate transportation to post-acute venues  Outcome: Progressing

## 2024-07-18 NOTE — PROGRESS NOTES
Progress Note - Behavioral Health   Alberto Berumen 28 y.o. male MRN: [id number]  Unit/Bed#: [facility] 108-02 Encounter: [id number]    The patient was seen for continuing care and reviewed with treatment team.  Generally cooperative with treatment plan.  Mood remains depressed with a blunted and constricted affect.  He is receiving ECT and is scheduled for treatment for morning.    Current Mental Status Evaluation:  Appearance:  Adequate hygiene and grooming and Good eye contact   Behavior:  Calm and Cooperative   Mood:  Depressed   Affect: blunted and constricted   Speech: Sparse and Normal volume   Thought Process:  Goal directed and coherent   Thought Content:  Paranoid and mistrustful and Delusions of persecution   Perceptual Disturbances: Auditory hallucinations without commands   Risk Potential: No suicidal or homicidal ideation   Orientation:        Progress Toward Goals: No significant events in the past 24 hours  Principal Problem:    Schizoaffective disorder, bipolar type (HCC)  Active Problems:    GERD (gastroesophageal reflux disease)    Medical clearance for psychiatric admission    Tobacco abuse    T wave inversion in EKG    Chronic idiopathic constipation    Confluent and reticulate papillomatosis    Primary hypertension    Elevated hemoglobin A1c    Bilateral lower extremity edema    Discharge planning update:The patient will return to previous living arrangement    Recommended Treatment: Continue with ECT and pharmacotherapy, group therapy, milieu therapy and occupational therapy.  The patient will be maintained on the following medications:  Current Facility-Administered Medications   Medication Dose Route Frequency Provider Last Rate    acetaminophen  650 mg Oral Q4H PRN Jordan C Holter,       acetaminophen  650 mg Oral Q6H PRN HOLLI Lion      aluminum-magnesium hydroxide-simethicone  30 mL Oral Q4H PRN Jordan C Holter,       amLODIPine  5 mg Oral Daily HOLLI Lion       ARIPiprazole  15 mg Oral Daily Bora Rosario MD      Artificial Tears  1 drop Both Eyes Q3H PRN Jordan C Holter, DO      atropine  1 drop Sublingual Daily PRN HOLLI Lion      haloperidol lactate  2.5 mg Intramuscular Q4H PRN Max 4/day STEVE LionNP      And    LORazepam  1 mg Intramuscular Q4H PRN Max 4/day Eveline Hunt CRNP      And    benztropine  0.5 mg Intramuscular Q4H PRN Max 4/day Eveline Hunt CRNP      benztropine  1 mg Intramuscular Q4H PRN Max 6/day Jordan C Holter, DO      haloperidol lactate  5 mg Intramuscular Q4H PRN Max 4/day STEVE LionNP      And    LORazepam  2 mg Intramuscular Q4H PRN Max 4/day HOLLI Lion      And    benztropine  1 mg Intramuscular Q4H PRN Max 4/day HOLLI Lion      benztropine  1 mg Oral Q4H PRN Max 6/day HOLLI Lion      benztropine  1 mg Oral Q4H PRN Max 6/day Jordan C Holter, DO      bisacodyl  10 mg Rectal Daily PRN HOLLI Lion      calcium carbonate  500 mg Oral BID PRN HOLLI Monge      cloZAPine  500 mg Oral HS Bora Rosario MD      cyanocobalamin  1,000 mcg Oral Daily HOLLI Galvan      hydrOXYzine HCL  50 mg Oral Q6H PRN Max 4/day Jordan C Holter, DO      Or    diphenhydrAMINE  50 mg Intramuscular Q6H PRN Jordan C Holter, DO      hydrOXYzine HCL  50 mg Oral Q6H PRN Max 4/day HOLLI Lion      Or    diphenhydrAMINE  50 mg Intramuscular Q6H PRN HOLLI Lion      diphenhydrAMINE-zinc acetate   Topical BID PRN HOLLI Lion      escitalopram  20 mg Oral Daily HOLLI Lion      famotidine  20 mg Oral BID HOLLI Monge      fluticasone  1 spray Each Nare Daily Brina Guillen MD      glycopyrrolate  1 mg Oral TID Bora Rosario MD      haloperidol  1 mg Oral Q6H PRN HOLLI Lion      haloperidol  2.5 mg Oral Q4H PRN Max 4/day HOLLI Lion      haloperidol  5 mg Oral Q4H PRN Max 4/day HOLLI Lion      hydroCHLOROthiazide  12.5 mg Oral Daily Pia  HOLLI Mantilla      hydrocortisone   Topical 4x Daily PRN HOLLI Lion      hydrOXYzine HCL  100 mg Oral Q6H PRN Max 4/day [facility] Holter, DO      Or    LORazepam  2 mg Intramuscular Q6H PRN [facility] Holter, DO      hydrOXYzine HCL  100 mg Oral Q6H PRN Max 4/day HOLLI Lion      Or    LORazepam  2 mg Intramuscular Q6H PRN HOLLI Lion      hydrOXYzine HCL  25 mg Oral Q6H PRN Max 4/day [facility] Holter, DO      ibuprofen  600 mg Oral Q8H PRN HOLLI Lion      Lidocaine Viscous HCl  15 mL Swish & Spit 4x Daily PRN Bora Rosario MD      loratadine  10 mg Oral Daily Brina Guillen MD      melatonin  3 mg Oral HS [facility] Holter, DO      metFORMIN  500 mg Oral BID With Meals HOLLI Galvan      methocarbamol  500 mg Oral Q6H PRN HOLLI Lion      nicotine polacrilex  2 mg Oral Q4H PRN Bora Rosario MD      OLANZapine  5 mg Oral Q4H PRN Max 3/day [facility] Holter, DO      Or    OLANZapine  2.5 mg Intramuscular Q4H PRN Max 3/day [facility] Holter, DO      OLANZapine  5 mg Oral Q3H PRN Max 3/day [facility] Holter, DO      Or    OLANZapine  5 mg Intramuscular Q3H PRN Max 3/day [facility] Holter, DO      OLANZapine  2.5 mg Oral Q4H PRN Max 6/day [facility] Holter, DO      ondansetron  4 mg Oral Q6H PRN HOLLI Lion      pantoprazole  20 mg Oral Early Morning Eileen Jensen, HOLLI      polyethylene glycol  17 g Oral Daily HOLLI Lion      polyethylene glycol  17 g Oral Daily PRN [facility] Cresencioter, DO      propranolol  10 mg Oral Q12H KAYLIE HOLLI Lion      senna-docusate sodium  1 tablet Oral Daily PRN Ion  Cresencioter, DO      senna-docusate sodium  2 tablet Oral BID HOLLI Lion      traZODone  50 mg Oral HS PRN HOLLI Lion      white petrolatum-mineral oil   Topical TID PRN HOLLI Lion

## 2024-07-19 ENCOUNTER — ANESTHESIA (INPATIENT)
Dept: PREOP/PACU | Facility: HOSPITAL | Age: 29
DRG: 750 | End: 2024-07-19
Payer: COMMERCIAL

## 2024-07-19 ENCOUNTER — ANESTHESIA EVENT (INPATIENT)
Dept: PREOP/PACU | Facility: HOSPITAL | Age: 29
DRG: 750 | End: 2024-07-19
Payer: COMMERCIAL

## 2024-07-19 ENCOUNTER — APPOINTMENT (INPATIENT)
Dept: PREOP/PACU | Facility: HOSPITAL | Age: 29
DRG: 750 | End: 2024-07-19
Attending: PSYCHIATRY & NEUROLOGY
Payer: COMMERCIAL

## 2024-07-19 PROCEDURE — 90870 ELECTROCONVULSIVE THERAPY: CPT

## 2024-07-19 PROCEDURE — GZB2ZZZ ELECTROCONVULSIVE THERAPY, BILATERAL-SINGLE SEIZURE: ICD-10-PCS | Performed by: STUDENT IN AN ORGANIZED HEALTH CARE EDUCATION/TRAINING PROGRAM

## 2024-07-19 PROCEDURE — 90870 ELECTROCONVULSIVE THERAPY: CPT | Performed by: STUDENT IN AN ORGANIZED HEALTH CARE EDUCATION/TRAINING PROGRAM

## 2024-07-19 RX ORDER — GLYCOPYRROLATE 0.2 MG/ML
INJECTION INTRAMUSCULAR; INTRAVENOUS AS NEEDED
Status: DISCONTINUED | OUTPATIENT
Start: 2024-07-19 | End: 2024-07-19

## 2024-07-19 RX ORDER — MIDAZOLAM HYDROCHLORIDE 2 MG/2ML
INJECTION, SOLUTION INTRAMUSCULAR; INTRAVENOUS AS NEEDED
Status: DISCONTINUED | OUTPATIENT
Start: 2024-07-19 | End: 2024-07-19

## 2024-07-19 RX ORDER — HYDRALAZINE HYDROCHLORIDE 20 MG/ML
INJECTION INTRAMUSCULAR; INTRAVENOUS AS NEEDED
Status: DISCONTINUED | OUTPATIENT
Start: 2024-07-19 | End: 2024-07-19

## 2024-07-19 RX ORDER — ETOMIDATE 2 MG/ML
INJECTION INTRAVENOUS AS NEEDED
Status: DISCONTINUED | OUTPATIENT
Start: 2024-07-19 | End: 2024-07-19

## 2024-07-19 RX ORDER — ESMOLOL HYDROCHLORIDE 10 MG/ML
INJECTION INTRAVENOUS AS NEEDED
Status: DISCONTINUED | OUTPATIENT
Start: 2024-07-19 | End: 2024-07-19

## 2024-07-19 RX ORDER — SUCCINYLCHOLINE/SOD CL,ISO/PF 100 MG/5ML
SYRINGE (ML) INTRAVENOUS AS NEEDED
Status: DISCONTINUED | OUTPATIENT
Start: 2024-07-19 | End: 2024-07-19

## 2024-07-19 RX ORDER — LABETALOL HYDROCHLORIDE 5 MG/ML
INJECTION, SOLUTION INTRAVENOUS AS NEEDED
Status: DISCONTINUED | OUTPATIENT
Start: 2024-07-19 | End: 2024-07-19

## 2024-07-19 RX ORDER — SODIUM CHLORIDE, SODIUM LACTATE, POTASSIUM CHLORIDE, CALCIUM CHLORIDE 600; 310; 30; 20 MG/100ML; MG/100ML; MG/100ML; MG/100ML
INJECTION, SOLUTION INTRAVENOUS CONTINUOUS PRN
Status: DISCONTINUED | OUTPATIENT
Start: 2024-07-19 | End: 2024-07-19

## 2024-07-19 RX ADMIN — LIDOCAINE HYDROCHLORIDE 15 ML: 20 SOLUTION ORAL at 17:31

## 2024-07-19 RX ADMIN — FAMOTIDINE 20 MG: 20 TABLET ORAL at 17:15

## 2024-07-19 RX ADMIN — PROPRANOLOL HYDROCHLORIDE 10 MG: 10 TABLET ORAL at 21:35

## 2024-07-19 RX ADMIN — CLOZAPINE 500 MG: 100 TABLET ORAL at 21:19

## 2024-07-19 RX ADMIN — ESCITALOPRAM OXALATE 20 MG: 10 TABLET, FILM COATED ORAL at 08:38

## 2024-07-19 RX ADMIN — HYDROCHLOROTHIAZIDE 12.5 MG: 12.5 TABLET ORAL at 05:15

## 2024-07-19 RX ADMIN — HYDRALAZINE HYDROCHLORIDE 20 MG: 20 INJECTION, SOLUTION INTRAMUSCULAR; INTRAVENOUS at 06:52

## 2024-07-19 RX ADMIN — ETOMIDATE INJECTION 20 MG: 2 SOLUTION INTRAVENOUS at 06:54

## 2024-07-19 RX ADMIN — MIDAZOLAM HYDROCHLORIDE 2 MG: 1 INJECTION, SOLUTION INTRAMUSCULAR; INTRAVENOUS at 06:58

## 2024-07-19 RX ADMIN — LABETALOL HYDROCHLORIDE 20 MG: 5 INJECTION, SOLUTION INTRAVENOUS at 06:56

## 2024-07-19 RX ADMIN — GLYCOPYRROLATE 0.4 MG: 0.2 INJECTION INTRAMUSCULAR; INTRAVENOUS at 06:39

## 2024-07-19 RX ADMIN — METFORMIN HYDROCHLORIDE 500 MG: 500 TABLET, FILM COATED ORAL at 08:37

## 2024-07-19 RX ADMIN — METFORMIN HYDROCHLORIDE 500 MG: 500 TABLET, FILM COATED ORAL at 17:15

## 2024-07-19 RX ADMIN — GLYCOPYRROLATE 1 MG: 1 TABLET ORAL at 21:18

## 2024-07-19 RX ADMIN — NICOTINE POLACRILEX 2 MG: 2 GUM, CHEWING ORAL at 08:38

## 2024-07-19 RX ADMIN — GLYCOPYRROLATE 1 MG: 1 TABLET ORAL at 17:15

## 2024-07-19 RX ADMIN — Medication 50 MG: at 06:54

## 2024-07-19 RX ADMIN — MELATONIN TAB 3 MG 3 MG: 3 TAB at 21:22

## 2024-07-19 RX ADMIN — CYANOCOBALAMIN TAB 1000 MCG 1000 MCG: 1000 TAB at 08:38

## 2024-07-19 RX ADMIN — SENNOSIDES AND DOCUSATE SODIUM 2 TABLET: 8.6; 5 TABLET ORAL at 08:37

## 2024-07-19 RX ADMIN — SODIUM CHLORIDE, SODIUM LACTATE, POTASSIUM CHLORIDE, AND CALCIUM CHLORIDE: .6; .31; .03; .02 INJECTION, SOLUTION INTRAVENOUS at 06:22

## 2024-07-19 RX ADMIN — ARIPIPRAZOLE 15 MG: 15 TABLET ORAL at 08:39

## 2024-07-19 RX ADMIN — NICOTINE POLACRILEX 2 MG: 2 GUM, CHEWING ORAL at 21:22

## 2024-07-19 RX ADMIN — AMLODIPINE BESYLATE 5 MG: 5 TABLET ORAL at 05:17

## 2024-07-19 RX ADMIN — FAMOTIDINE 20 MG: 20 TABLET ORAL at 08:39

## 2024-07-19 RX ADMIN — SENNOSIDES AND DOCUSATE SODIUM 2 TABLET: 8.6; 5 TABLET ORAL at 17:15

## 2024-07-19 RX ADMIN — PROPRANOLOL HYDROCHLORIDE 10 MG: 10 TABLET ORAL at 05:17

## 2024-07-19 RX ADMIN — Medication 140 MG: at 06:54

## 2024-07-19 RX ADMIN — LORATADINE 10 MG: 10 TABLET ORAL at 08:37

## 2024-07-19 NOTE — NURSING NOTE
Received pt in bed at change of shift with eyes closed; chest movement noted.  Continues the same thus this far as per q 7 min room checks.   Will continue to monitor behavior, sleeping pattern and any medical issues that may arise.    0445:  Maintained NPO for ECT this am.      0522:   BP meds given prior to ECT as ordered.     0558;  PACU calling for ECT procedure.  To be transported via  by staff.

## 2024-07-19 NOTE — NURSING NOTE
Patient stated his tongue hurt. On assessment patient bit right side of tongue. Picture taken. Slim made aware. Patient given lidocaine mouth rinse. Patient refused tylenol.

## 2024-07-19 NOTE — SOCIAL WORK
SW attempted 1:1 with pt. Pt observed in bed, snoring, sleeping soundly after ECT this morning. SW unable to verbally wake pt.

## 2024-07-19 NOTE — PLAN OF CARE
Problem: Ineffective Coping  Goal: Identifies ineffective coping skills  Outcome: Not Progressing  Goal: Identifies healthy coping skills  Outcome: Not Progressing  Goal: Demonstrates healthy coping skills  Outcome: Not Progressing  Goal: Participates in unit activities  Description: Interventions:  - Provide therapeutic environment   - Provide required programming   - Redirect inappropriate behaviors   Outcome: Not Progressing    Attended 23/35 groups offered in the past 4 days.

## 2024-07-19 NOTE — ANESTHESIA PREPROCEDURE EVALUATION
Procedure:  ELECTROCONVULSIVE THERAPY (ECT)    Relevant Problems   CARDIO   (+) Primary hypertension      GI/HEPATIC   (+) GERD (gastroesophageal reflux disease)        Physical Exam    Airway    Mallampati score: III  TM Distance: >3 FB  Neck ROM: full     Dental   No notable dental hx     Cardiovascular  Cardiovascular exam normal    Pulmonary  Pulmonary exam normal     Other Findings        Anesthesia Plan  ASA Score- 3     Anesthesia Type- general with ASA Monitors.         Additional Monitors:     Airway Plan:            Plan Factors-Exercise tolerance (METS): >4 METS.    Chart reviewed. EKG reviewed.  Existing labs reviewed. Patient summary reviewed.    Patient is not a current smoker.              Induction- intravenous.    Postoperative Plan-     Perioperative Resuscitation Plan - Level 1 - Full Code.       Informed Consent- Anesthetic plan and risks discussed with patient.  I personally reviewed this patient with the CRNA. Discussed and agreed on the Anesthesia Plan with the CRNA..

## 2024-07-19 NOTE — NURSING NOTE
Patient had ect this am. Patient states the voices are better after treatment. Patient visible for meals. Patient states he is tired from his treatment. Appetite is good.

## 2024-07-19 NOTE — PLAN OF CARE
Problem: Alteration in Thoughts and Perception  Goal: Treatment Goal: Gain control of psychotic behaviors/thinking, reduce/eliminate presenting symptoms and demonstrate improved reality functioning upon discharge  Outcome: Progressing  Goal: Verbalize thoughts and feelings  Description: Interventions:  - Promote a nonjudgmental and trusting relationship with the patient through active listening and therapeutic communication  - Assess patient's level of functioning, behavior and potential for risk  - Engage patient in 1 on 1 interactions  - Encourage patient to express fears, feelings, frustrations, and discuss symptoms    - [place] patient to reality, help patient recognize reality-based thinking   - Administer medications as ordered and assess for potential side effects  - Provide the patient education related to the signs and symptoms of the illness and desired effects of prescribed medications  Outcome: Progressing  Goal: Refrain from acting on delusional thinking/internal stimuli  Description: Interventions:  - Monitor patient closely, per order   - Utilize least restrictive measures   - Set reasonable limits, give positive feedback for acceptable   - Administer medications as ordered and monitor of potential side effects  Outcome: Progressing  Goal: Agree to be compliant with medication regime, as prescribed and report medication side effects  Description: Interventions:  - Offer appropriate PRN medication and supervise ingestion; conduct AIMS, as needed   Outcome: Progressing  Goal: Attend and participate in unit activities, including therapeutic, recreational, and educational groups  Description: Interventions:  -Encourage Visitation and family involvement in care  Outcome: Progressing  Goal: Complete daily ADLs, including personal hygiene independently, as able  Description: Interventions:  - Observe, teach, and assist patient with ADLS  - Monitor and promote a balance of rest/activity, with adequate  nutrition and elimination   Outcome: Progressing     Problem: Ineffective Coping  Goal: Demonstrates healthy coping skills  Outcome: Progressing  Goal: Participates in unit activities  Description: Interventions:  - Provide therapeutic environment   - Provide required programming   - Redirect inappropriate behaviors   Outcome: Progressing     Problem: Anxiety  Goal: Anxiety is at manageable level  Description: Interventions:  - Assess and monitor patient's anxiety level.   - Monitor for signs and symptoms (heart palpitations, chest pain, shortness of breath, headaches, nausea, feeling jumpy, restlessness, irritable, apprehensive).   - Collaborate with interdisciplinary team and initiate plan and interventions as ordered.  - [place] patient to unit/surroundings  - Explain treatment plan  - Encourage participation in care  - Encourage verbalization of concerns/fears  - Identify coping mechanisms  - Assist in developing anxiety-reducing skills  - Administer/offer alternative therapies  - Limit or eliminate stimulants  Outcome: Progressing     Problem: Alteration in Orientation  Goal: Treatment Goal: Demonstrate a reduction of confusion and improved orientation to person, place, time and/or situation upon discharge, according to optimum baseline/ability  Outcome: Progressing  Goal: Interact with staff daily  Description: Interventions:  - Assess and re-assess patient's level of orientation  - Engage patient in 1 on 1 interactions, daily, for a minimum of 15 minutes   - Establish rapport/trust with patient   Outcome: Progressing  Goal: Cooperate with recommended testing/procedures  Description: Interventions:  - Determine need for ancillary testing  - Observe for mental status changes  - Implement falls/precaution protocol   Outcome: Progressing  Goal: Attend and participate in unit activities, including therapeutic, recreational, and educational groups  Description: Interventions:  - Provide therapeutic and educational  activities daily, encourage attendance and participation, and document same in the medical record   - Provide appropriate opportunities for reminiscence   - Provide a consistent daily routine   - Encourage family contact/visitation   Outcome: Progressing  Goal: Complete daily ADLs, including personal hygiene independently, as able  Description: Interventions:  - Observe, teach, and assist patient with ADLS  Outcome: Progressing     Problem: DISCHARGE PLANNING - CARE MANAGEMENT  Goal: Discharge to post-acute care or home with appropriate resources  Description: INTERVENTIONS:  - Conduct assessment to determine patient/family and health care team treatment goals, and need for post-acute services based on payer coverage, community resources, and patient preferences, and barriers to discharge  - Address psychosocial, clinical, and financial barriers to discharge as identified in assessment in conjunction with the patient/family and health care team  - Arrange appropriate level of post-acute services according to patient’s   needs and preference and payer coverage in collaboration with the physician and health care team  - Communicate with and update the patient/family, physician, and health care team regarding progress on the discharge plan  - Arrange appropriate transportation to post-acute venues  Outcome: Progressing

## 2024-07-19 NOTE — PROGRESS NOTES
07/19/24 0716   Team Meeting   Meeting Type Daily Rounds   Team Members Present   Team Members Present Physician;Nurse;;Other (Discipline and Name)   Patient/Family Present   Patient Present No   Patient's Family Present No     In attendance:  MD Guy Gamino, RN  Luisana Alvarado, JORDAN Mcdowell M.S.    Groups: 7/9    Pt had labs completed. Pt completed ECT this morning; yesterday pt reported voices were worse and utilized coping skills; no PRNs needed. Pt is social with select peers.

## 2024-07-19 NOTE — PROCEDURES
Procedure Note - ECT  Alberto Berumen 28 y.o. male MRN: [id number]    Time out was taken with staff to confirm correct patient and correct procedure to be performed. Patient reports he is doing well; feels some benefits with the ECT and would like to continue.    Session Number: Maintenance    Diagnosis: Principal Problem:    Schizoaffective disorder, bipolar type (HCC)  Active Problems:    GERD (gastroesophageal reflux disease)    Medical clearance for psychiatric admission    Tobacco abuse    T wave inversion in EKG    Chronic idiopathic constipation    Confluent and reticulate papillomatosis    Primary hypertension    Elevated hemoglobin A1c    Bilateral lower extremity edema      ECT Type: Inpatient, Maintenance    Anesthesia: Etomidate :    Electrode Placement: Bilateral    Energy level:  100 %      Seizure Duration     EE Sec.    EMG : 29 Sec    Post-ictal Suppression Index: 94.9 %    Results:Clinical seizure was satisfactory, Patient tolerated ECT well    Vitals:    24 0730   BP: 138/76   Pulse: 90   Resp: (!) 8   Temp:    SpO2: 95%        Medication Administration - last 24 hours from 2024 0740 to 2024 0740         Date/Time Order Dose Route Action Action by     2024 0517 EDT amLODIPine (NORVASC) tablet 5 mg 5 mg Oral Given Mireya Pearson RN     2024 0833 EDT amLODIPine (NORVASC) tablet 5 mg 5 mg Oral Given Linsey Gibbons RN     2024 0833 EDT escitalopram (LEXAPRO) tablet 20 mg 20 mg Oral Given Linsey Gibbons RN     2024 0916 EDT polyethylene glycol (MIRALAX) packet 17 g 17 g Oral Not Given Linsey Gibbons RN     2024 0517 EDT propranolol (INDERAL) tablet 10 mg 10 mg Oral Given Mireya Pearson RN     2024 2121 EDT propranolol (INDERAL) tablet 10 mg 10 mg Oral Given Mare Berry RN     2024 0834 EDT propranolol (INDERAL) tablet 10 mg 10 mg Oral Given Linsey Gibbons RN     2024 1718 EDT senna-docusate sodium (SENOKOT S) 8.6-50 mg per  tablet 2 tablet 2 tablet Oral Given Linsey Gibbons, MIGUEL     07/18/2024 0833 EDT senna-docusate sodium (SENOKOT S) 8.6-50 mg per tablet 2 tablet 2 tablet Oral Given Linsey Gibbons, RN     07/18/2024 2121 EDT melatonin tablet 3 mg 3 mg Oral Given Mare Berry, RN     07/18/2024 0833 EDT cyanocobalamin (VITAMIN B-12) tablet 1,000 mcg 1,000 mcg Oral Given Linsey Gibbons, MIGUEL     07/18/2024 2121 EDT cloZAPine (CLOZARIL) tablet 500 mg 500 mg Oral Given Mare Berry, RN     07/18/2024 2121 EDT nicotine polacrilex (NICORETTE) gum 2 mg 2 mg Oral Given Mare Berry, RN     07/18/2024 1718 EDT nicotine polacrilex (NICORETTE) gum 2 mg 2 mg Oral Given Linsey Gibbons RN     07/18/2024 0833 EDT nicotine polacrilex (NICORETTE) gum 2 mg 2 mg Oral Given Linsey Gibbons RN     07/18/2024 0836 EDT fluticasone (FLONASE) 50 mcg/act nasal spray 1 spray 1 spray Each Nare Not Given Linsey Gibbons, RN     07/18/2024 0833 EDT loratadine (CLARITIN) tablet 10 mg 10 mg Oral Given Linsey Gibbons RN     07/19/2024 0515 EDT hydroCHLOROthiazide tablet 12.5 mg 12.5 mg Oral Given Mireya Pearson RN     07/18/2024 0834 EDT hydroCHLOROthiazide tablet 12.5 mg 12.5 mg Oral Given Linsey Gibbons, RN     07/18/2024 1719 EDT metFORMIN (GLUCOPHAGE) tablet 500 mg 500 mg Oral Given Linsey Gibbons, RN     07/18/2024 0833 EDT metFORMIN (GLUCOPHAGE) tablet 500 mg 500 mg Oral Given Linsey Gibbons RN     07/18/2024 2121 EDT glycopyrrolate (ROBINUL) tablet 1 mg 1 mg Oral Given Mare Berry RN     07/18/2024 1718 EDT glycopyrrolate (ROBINUL) tablet 1 mg 1 mg Oral Given Linsey Gibbons RN     07/18/2024 0833 EDT glycopyrrolate (ROBINUL) tablet 1 mg 1 mg Oral Given Linsey Gibbons RN     07/18/2024 0833 EDT ARIPiprazole (ABILIFY) tablet 15 mg 15 mg Oral Given Linsey Gibbons RN     07/19/2024 0546 EDT pantoprazole (PROTONIX) EC tablet 20 mg 20 mg Oral Not Given Mireya Pearson RN     07/18/2024 1719 EDT famotidine (PEPCID) tablet 20 mg 20 mg Oral Given Linsey Gibbons RN     07/18/2024 0834  EDT famotidine (PEPCID) tablet 20 mg 20 mg Oral Given Lnisey Gibbons RN     07/18/2024 1909 EDT Lidocaine Viscous HCl (XYLOCAINE) 2 % mucosal solution 15 mL 15 mL Swish & Spit Given Mare Berry RN     07/18/2024 1416 EDT Lidocaine Viscous HCl (XYLOCAINE) 2 % mucosal solution 15 mL 15 mL Swish & Spit Given Linsey Gibbons RN     07/18/2024 0934 EDT Lidocaine Viscous HCl (XYLOCAINE) 2 % mucosal solution 15 mL 15 mL Swish & Spit Given Linsey Gibbons RN     07/19/2024 0734 EDT lactated ringers infusion 0 mL/kg/hr Intravenous Stopped Cynthia Montenegro RN     07/19/2024 0712 EDT lactated ringers infusion -- Intravenous Anesthesia Volume Adjustment Anjel Arriaza CRNA     07/19/2024 0622 EDT lactated ringers infusion -- Intravenous New Bag Anjel Arriaza CRNA

## 2024-07-19 NOTE — NURSING NOTE
Pt requested and given PRN lidocaine viscous 2 % mucosal solution at 1909 for c/o tongue pain. Per report RN assessed pt's tongue and is unremarkable.

## 2024-07-19 NOTE — PROGRESS NOTES
Psychiatry Progress Note [facility]    Alberto Berumen 28 y.o. male MRN: [id number]  Unit/Bed#: [facility] 108-02 Encounter: [id number]  Code Status: Level 1 - Full Code    PCP: Joce Juan MD    Date of Admission:  3/29/2024 2008   Date of Service:  07/19/24    Patient Active Problem List   Diagnosis    GERD (gastroesophageal reflux disease)    Medical clearance for psychiatric admission    Schizoaffective disorder, bipolar type (HCC)    Tobacco abuse    T wave inversion in EKG    Syringoma    Chronic idiopathic constipation    Vitamin B 12 deficiency    Vitamin D deficiency    Confluent and reticulate papillomatosis    Class 2 obesity in adult    Primary hypertension    Elevated hemoglobin A1c    Bilateral lower extremity edema         Review of systems: Patient had ECT done this morning, he is reporting some difficulty with memory but otherwise states he is doing ok.     Psychiatric Diagnosis: Schizoaffective bipolar    Assessment  Overall Status: He continues to report threatening voices but states that they are less today after ECT. He is having some difficulty with memory currently and very tired currently.  Certification Statement: The patient will continue to require additional inpatient hospital stay due to ongoing threatening voices with lack of strong response from medications and ECT so far.       Medications:   - Clozapine 500mg PO at bedtime  - Propranolol 10mg every 12 hours as needed for anxiety  - Abilify 15mg PO at bedtime  - Lexapro 20mg PO daily  - Atropine eydrops sublingual for drooling of saliva  - Senna 2 tablets twice a day for constipation  Side effects from treatment: none  Medication changes   none   Medication education   Risks side effects benefits and precautions of medications discussed with patient and he did verbalize an understanding about risks for metabolic syndrome from being on neuroleptics and is form tardive dyskinesia etc.  All medications reviewed and I  recommend that they be continued for symptom management   Understanding of medications: some understanding   Justification for dual anti-psychotics: n/a    Non-pharmacological treatments  Continue with individual, group, milieu and occupational therapy using recovery principles and psycho-education about accepting illness and the need for treatment.  Continue ECT q2 weekly for maintenance x 6  Refusal to see dietician. Agreeable to more exercises for obesity  Plan to explore ACT team with discussion with aunt as well  Risperdal replaced with Abilify due to previously high prolactin level    Safety  Safety and communication plan established to target dynamic risk factors discussed above.    Discharge Plan   Plan is to discharge back to his aunt with ACT team once ECT is completed    Interval Progress   He is laying in bed today after ECT treatment this morning. Feels very fatigued and having difficulty with memory but denies any other side effects currently. He states the same threatening voices to kill him and his family are present but that the ECT treatment has lessened them today. Per report he stated that the voices were getting very loud yesterday. The voices are not commanding. He interacts with select peers. He is compliant with medications and attended 7/9 groups yesterday. He also had labs drawn yesterday.   Acceptance by patient: accepting  Hopefulness in recovery: living with aunt and sister  Involved in reintegration process: talking with family  Trusting in relationship with psychiatrist: trusting  Sleep: good  Appetite: overall good but no appetite this morning  Compliance with Medications: compliant  Group attendance: 7/9 yesterday  Significant events: He had labs done yesterday and ECT therapy this morning. States that the voices are less but still present this morning after ECT. Still pleasant and cooperative and interacting with select peers.       Mental Status Exam  Appearance: marginal hygiene,  dressed in hospital attire, overweight, patient in bed mostly covered in blanket, dreadlocked hair  Behavior: calm, slow responses, difficult to arouse due to ECT treament this morning  Speech: normal rate, scant, soft  Mood: dysphoric, anxious, feels very fatigued but overall feels better with his ECT treatment  Affect: constricted, mood-congruent  Thought Process: organized, coherent, decreased rate of thoughts  Thought Content: paranoid ideation, intrusive thoughts, preoccupied. He has paranoia due to the voices he hears that are threatening to kill him and his family. He states that ECT has helped and today after treatment they are less than they have been. He denies suicidal or homicidal thoughts verbalized today. He did not verbalize any phobias, obsessions, compulsions or distorted body perceptions.  Perceptual Disturbances: auditory hallucinations of threatening voices that tell him they are going to kill him and his family. Denies commanding voices. ECT helped today and voices are less than they have been.   Hx Risk Factors: chronic psychiatric problems, chronic anxiety symptoms, chronic psychotic symptoms  Sensorium: drowsy/sleepy but alert  Cognition: recent and remote memory grossly intact  Consciousness: alert and sleepy today, difficult to arouse as patient falls asleep during conversation  Attention: attention span and concentration are age appropriate  Intellect: appears to be of average intelligence  Insight: intact  Judgement: intact  Motor Activity: no abnormal movements     Vitals  Temp:  [97.4 °F (36.3 °C)-97.7 °F (36.5 °C)] 97.6 °F (36.4 °C)  HR:  [] 97  Resp:  [8-20] 18  BP: (114-138)/(60-79) 131/75  SpO2:  [95 %-97 %] 96 %    Intake/Output Summary (Last 24 hours) at 7/19/2024 0824  Last data filed at 7/19/2024 0712  Gross per 24 hour   Intake 500 ml   Output --   Net 500 ml       Lab Results: All Labs Within Last 24 Hours Reviewed  Results from last 7 days   Lab Units 07/18/24  3815    WBC Thousand/uL 12.00*   RBC Million/uL 5.52   HEMOGLOBIN g/dL 13.0   HEMATOCRIT % 43.7   MCV fL 79*   PLATELETS Thousands/uL 265   TOTAL NEUT ABS Thousands/µL 6.46   CLOZAPINE LVL ng/mL 451       Current Facility-Administered Medications   Medication Dose Route Frequency Provider Last Rate    acetaminophen  650 mg Oral Q4H PRN Jordan C Holter, DO      acetaminophen  650 mg Oral Q6H PRN HOLLI Lion      aluminum-magnesium hydroxide-simethicone  30 mL Oral Q4H PRN Jordan C Holter, DO      amLODIPine  5 mg Oral Daily HOLLI Lion      ARIPiprazole  15 mg Oral Daily Bora Rosario MD      Artificial Tears  1 drop Both Eyes Q3H PRN Jordan C Holter, DO      atropine  1 drop Sublingual Daily PRN HOLLI Lion      haloperidol lactate  2.5 mg Intramuscular Q4H PRN Max 4/day HOLLI Lion      And    LORazepam  1 mg Intramuscular Q4H PRN Max 4/day HOLLI Lion      And    benztropine  0.5 mg Intramuscular Q4H PRN Max 4/day HOLLI Lion      benztropine  1 mg Intramuscular Q4H PRN Max 6/day Jordan C Holter, DO      haloperidol lactate  5 mg Intramuscular Q4H PRN Max 4/day HOLLI Lion      And    LORazepam  2 mg Intramuscular Q4H PRN Max 4/day HOLLI Lion      And    benztropine  1 mg Intramuscular Q4H PRN Max 4/day HOLLI Lion      benztropine  1 mg Oral Q4H PRN Max 6/day HOLLI Lion      benztropine  1 mg Oral Q4H PRN Max 6/day Jordan C Holter, DO      bisacodyl  10 mg Rectal Daily PRN HOLLI Lion      calcium carbonate  500 mg Oral BID PRN HOLLI Monge      cloZAPine  500 mg Oral HS Bora Rosario MD      cyanocobalamin  1,000 mcg Oral Daily HOLLI Galvan      hydrOXYzine HCL  50 mg Oral Q6H PRN Max 4/day Jordan C Holter, DO      Or    diphenhydrAMINE  50 mg Intramuscular Q6H PRN Jordan C Holter, DO      hydrOXYzine HCL  50 mg Oral Q6H PRN Max 4/day HOLLI Lion      Or    diphenhydrAMINE  50 mg Intramuscular Q6H PRN  HOLLI Lion      diphenhydrAMINE-zinc acetate   Topical BID PRN HOLLI Lion      escitalopram  20 mg Oral Daily HOLLI Lion      famotidine  20 mg Oral BID Eileen CherryHOLLI Jimenez      fluticasone  1 spray Each Nare Daily Brina Guillen MD      glycopyrrolate  1 mg Oral TID Bora Rosario MD      haloperidol  1 mg Oral Q6H PRN HOLLI Lion      haloperidol  2.5 mg Oral Q4H PRN Max 4/day HOLLI Lion      haloperidol  5 mg Oral Q4H PRN Max 4/day HOLLI Lion      hydroCHLOROthiazide  12.5 mg Oral Daily HOLLI Galvan      hydrocortisone   Topical 4x Daily PRN HOLLI Lion      hydrOXYzine HCL  100 mg Oral Q6H PRN Max 4/day Ion C Holter, DO      Or    LORazepam  2 mg Intramuscular Q6H PRN [facility] Holter, DO      hydrOXYzine HCL  100 mg Oral Q6H PRN Max 4/day HOLLI Lion      Or    LORazepam  2 mg Intramuscular Q6H PRN HOLLI Lion      hydrOXYzine HCL  25 mg Oral Q6H PRN Max 4/day [facility] Holter, DO      ibuprofen  600 mg Oral Q8H PRN HOLLI Lion      Lidocaine Viscous HCl  15 mL Swish & Spit 4x Daily PRN Bora Rosario MD      loratadine  10 mg Oral Daily Brina Guillen MD      melatonin  3 mg Oral HS Ion C Holter, DO      metFORMIN  500 mg Oral BID With Meals HOLLI Galvan      methocarbamol  500 mg Oral Q6H PRN HOLLI Lion      nicotine polacrilex  2 mg Oral Q4H PRN Bora Rosario MD      OLANZapine  5 mg Oral Q4H PRN Max 3/day Ion C Holter, DO      Or    OLANZapine  2.5 mg Intramuscular Q4H PRN Max 3/day Ion C Holter, DO      OLANZapine  5 mg Oral Q3H PRN Max 3/day Ion C Holter, DO      Or    OLANZapine  5 mg Intramuscular Q3H PRN Max 3/day Ion C Holter, DO      OLANZapine  2.5 mg Oral Q4H PRN Max 6/day Ion C Holter, DO      ondansetron  4 mg Oral Q6H PRN HOLLI Lion      pantoprazole  20 mg Oral Early Morning Eileen Jensen, HOLLI      polyethylene glycol  17 g Oral Daily Eveline Hunt,  HOLLI      polyethylene glycol  17 g Oral Daily PRN Jordan C Holter,       propranolol  10 mg Oral Q12H [facility] HOLLI Lion      senna-docusate sodium  1 tablet Oral Daily PRN Jordan C Holter, DO      senna-docusate sodium  2 tablet Oral BID HOLLI Lion      traZODone  50 mg Oral HS PRN HOLLI Lion      white petrolatum-mineral oil   Topical TID PRN HOLLI Lion         Counseling / Coordination of Care: Total floor / unit time spent today 15 minutes. Greater than 50% of total time was spent with the patient and / or family counseling and / or somewhat receptive to supportive listening and teaching positive coping skills to deal with symptom mangement.     Patient's Rights, confidentiality and exceptions to confidentiality, use of automated medical record, [facility] staff access to medical record, and consent to treatment reviewed.    This note has been dictated and hence there may be problems with punctuation, spelling and formatting and if anyone has any concerns please address them to Dr. Rosario   This note is not shared with patient due to potential for making patient's condition worse by knowing the content of the note.

## 2024-07-20 PROCEDURE — 99232 SBSQ HOSP IP/OBS MODERATE 35: CPT

## 2024-07-20 RX ADMIN — ARIPIPRAZOLE 15 MG: 15 TABLET ORAL at 08:36

## 2024-07-20 RX ADMIN — NICOTINE POLACRILEX 2 MG: 2 GUM, CHEWING ORAL at 17:36

## 2024-07-20 RX ADMIN — CLOZAPINE 500 MG: 100 TABLET ORAL at 21:27

## 2024-07-20 RX ADMIN — SENNOSIDES AND DOCUSATE SODIUM 2 TABLET: 8.6; 5 TABLET ORAL at 08:37

## 2024-07-20 RX ADMIN — NICOTINE POLACRILEX 2 MG: 2 GUM, CHEWING ORAL at 12:46

## 2024-07-20 RX ADMIN — PROPRANOLOL HYDROCHLORIDE 10 MG: 10 TABLET ORAL at 21:27

## 2024-07-20 RX ADMIN — GLYCOPYRROLATE 1 MG: 1 TABLET ORAL at 21:27

## 2024-07-20 RX ADMIN — NICOTINE POLACRILEX 2 MG: 2 GUM, CHEWING ORAL at 21:45

## 2024-07-20 RX ADMIN — FAMOTIDINE 20 MG: 20 TABLET ORAL at 08:37

## 2024-07-20 RX ADMIN — LIDOCAINE HYDROCHLORIDE 15 ML: 20 SOLUTION ORAL at 12:46

## 2024-07-20 RX ADMIN — LIDOCAINE HYDROCHLORIDE 15 ML: 20 SOLUTION ORAL at 09:07

## 2024-07-20 RX ADMIN — METFORMIN HYDROCHLORIDE 500 MG: 500 TABLET, FILM COATED ORAL at 17:27

## 2024-07-20 RX ADMIN — FLUTICASONE PROPIONATE 1 SPRAY: 50 SPRAY, METERED NASAL at 08:42

## 2024-07-20 RX ADMIN — PANTOPRAZOLE SODIUM 20 MG: 20 TABLET, DELAYED RELEASE ORAL at 06:09

## 2024-07-20 RX ADMIN — GLYCOPYRROLATE 1 MG: 1 TABLET ORAL at 08:37

## 2024-07-20 RX ADMIN — ESCITALOPRAM OXALATE 20 MG: 10 TABLET, FILM COATED ORAL at 08:37

## 2024-07-20 RX ADMIN — MELATONIN TAB 3 MG 3 MG: 3 TAB at 21:27

## 2024-07-20 RX ADMIN — NICOTINE POLACRILEX 2 MG: 2 GUM, CHEWING ORAL at 08:38

## 2024-07-20 RX ADMIN — GLYCOPYRROLATE 1 MG: 1 TABLET ORAL at 17:27

## 2024-07-20 RX ADMIN — LORATADINE 10 MG: 10 TABLET ORAL at 08:36

## 2024-07-20 RX ADMIN — FAMOTIDINE 20 MG: 20 TABLET ORAL at 17:27

## 2024-07-20 RX ADMIN — METFORMIN HYDROCHLORIDE 500 MG: 500 TABLET, FILM COATED ORAL at 08:38

## 2024-07-20 RX ADMIN — CYANOCOBALAMIN TAB 1000 MCG 1000 MCG: 1000 TAB at 08:37

## 2024-07-20 RX ADMIN — SENNOSIDES AND DOCUSATE SODIUM 2 TABLET: 8.6; 5 TABLET ORAL at 17:27

## 2024-07-20 RX ADMIN — LIDOCAINE HYDROCHLORIDE 15 ML: 20 SOLUTION ORAL at 17:36

## 2024-07-20 RX ADMIN — POLYETHYLENE GLYCOL 3350 17 G: 17 POWDER, FOR SOLUTION ORAL at 08:38

## 2024-07-20 NOTE — NURSING NOTE
Alert, cooperative and visible intermittently. Socializing with selective peers. Consumed 100% of dinner. Took all medication without prompting. Pt administered lidocaine viscous for tongue discomfort @ 1736 and was effective. Maintained on safe precautions without incident.

## 2024-07-20 NOTE — NURSING NOTE
Alert, cooperative and visible intermittently. No SI or HI noted. Pt states he still her voices that want to hurt him and his family. Pt states he has a little bit of anxiety and depression. Routine dose of lexapro administered as ordered. Attended open art therapy, coping skills. Consumed 100% of breakfast and 100% of lunch.Took all medication without prompting. Lidocaine viscous administered @ 0907, and 1246 and was effective. Maintained on safe precautions without incident. Will continue to monitor progress and recovery program.

## 2024-07-20 NOTE — PROGRESS NOTES
"Progress Note - Behavioral Health   Alberto Berumen 28 y.o. male MRN: [id number]  Unit/Bed#: [facility] 108-02 Encounter: [id number]      Subjective:     Documentation, nursing notes, medication reconciliation, labs, and vitals reviewed. Patient was seen for continuing care and reviewed with treatment team.  No acute events over the past 24 hours. Per nursing report, and turning his blood pressure meds were held due to parameters yesterday morning, causing his blood pressure to be higher in the afternoon.  Alberto was sitting in the back of the unit with the female.  Nursing reports Alberto is incongruent with his reports of auditory hallucinations. PRN medications administered: Lidocaine mucosal solution at 1002 and Nicorette gum. No medication changes over the past 24 hours.     Evaluation today, Alberto presents walking with his head up in the hallway.  He states he is \"tired\" on approach.  He appears more engaged in conversation today.  Alberto reports depression this morning which she rates a 4/10 on the severity scale with 10 being the most severe.  Alberto also endorses depression which she rates a 7/10 on the severity scale with 10 being the most severe.  He denies SI/HI and is without plan or intent.  He denies visual hallucinations, but continues to endorse auditory hallucinations of voices stating they \"will kill my family\".  He denies any side effects from the medications.  Sleep and appetite remain adequate.  Alberto denies any pain at this time.    Continues to tolerate current medications with no adverse effects.     Denies depression and does not appear anxious.   No self-harming/suicidal ideation, plan, or intent upon direct inquiry. No thoughts to harm others.  No agitation or aggression noted. Does not appear overtly manic. Offers no further complaints.       Psychiatric ROS:  Behavior over the last 24 hours: unchanged  Sleep: normal  Appetite: normal  Medication side effects: No   ROS: no complaints, " all other systems are negative      Mental Status Evaluation:    Appearance:  casually dressed, dressed appropriately   Behavior:  cooperative, calm   Speech:  scant, soft   Mood:  depressed, anxious   Affect:  flat   Thought Process:  coherent   Associations: circumstantial associations   Thought Content:  no overt delusions   Perceptual Disturbances: no visual hallucinations, does not appear responding to internal stimuli, auditory hallucinations   Risk Potential: Suicidal ideation - None  Homicidal ideation - None  Potential for aggression - No   Sensorium:  oriented to person and place   Memory:  recent and remote memory grossly intact   Consciousness:  alert and awake   Attention/Concentration: attention span and concentration appear shorter than expected for age   Insight:  fair   Judgment: fair   Gait/Station: normal gait/station   Motor Activity: no abnormal movements       Vital signs in last 24 hours:    Temp:  [97.7 °F (36.5 °C)-97.8 °F (36.6 °C)] 97.7 °F (36.5 °C)  HR:  [85-93] 85  Resp:  [16-18] 16  BP: (116-146)/(75-88) 116/75    Laboratory results: I have personally reviewed all pertinent laboratory/tests results    Results from the past 24 hours: No results found for this or any previous visit (from the past 24 hour(s)).      Progress Toward Goals: progressing    Suicide/Homicide Risk Assessment:    Risk of Harm to Self:   Nursing Suicide Risk Assessment Last 24 hours: C-SSRS Risk (Since Last Contact)  Calculated C-SSRS Risk Score (Since Last Contact): No Risk Indicated    Risk of Harm to Others:  Nursing Homicide Risk Assessment: Violence Risk to Others: Denies within past 6 months    Assessment & Plan   Principal Problem:    Schizoaffective disorder, bipolar type (HCC)  Active Problems:    GERD (gastroesophageal reflux disease)    Medical clearance for psychiatric admission    Tobacco abuse    T wave inversion in EKG    Chronic idiopathic constipation    Confluent and reticulate papillomatosis     Primary hypertension    Elevated hemoglobin A1c    Bilateral lower extremity edema      Recommended Treatment:     Planned medication and treatment changes:    All current active medications have been reviewed  Encourage group therapy, milieu therapy and occupational therapy  Behavioral Health checks every 7 minutes  Continue with SLIM medical management as indicated  Disposition planning ongoing      Continue current medications:    Current Facility-Administered Medications   Medication Dose Route Frequency Provider Last Rate    acetaminophen  650 mg Oral Q4H PRN Jordan C Holter, DO      acetaminophen  650 mg Oral Q6H PRN HOLLI Lion      aluminum-magnesium hydroxide-simethicone  30 mL Oral Q4H PRN Jordan C Holter, DO      amLODIPine  5 mg Oral Daily HOLLI Lion      ARIPiprazole  15 mg Oral Daily Bora Rosario MD      Artificial Tears  1 drop Both Eyes Q3H PRN Jordan C Holter, DO      atropine  1 drop Sublingual Daily PRN HOLLI Lion      haloperidol lactate  2.5 mg Intramuscular Q4H PRN Max 4/day HOLLI Lion      And    LORazepam  1 mg Intramuscular Q4H PRN Max 4/day HOLLI Lion      And    benztropine  0.5 mg Intramuscular Q4H PRN Max 4/day HOLLI Lion      benztropine  1 mg Intramuscular Q4H PRN Max 6/day Jordan C Holter, DO      haloperidol lactate  5 mg Intramuscular Q4H PRN Max 4/day HOLLI Lion      And    LORazepam  2 mg Intramuscular Q4H PRN Max 4/day HOLLI Lion      And    benztropine  1 mg Intramuscular Q4H PRN Max 4/day HOLLI Lion      benztropine  1 mg Oral Q4H PRN Max 6/day HOLLI Lion      benztropine  1 mg Oral Q4H PRN Max 6/day Jordan C Holter, DO      bisacodyl  10 mg Rectal Daily PRN HOLLI Lion      calcium carbonate  500 mg Oral BID PRN HOLLI Monge      cloZAPine  500 mg Oral HS Bora Rosario MD      cyanocobalamin  1,000 mcg Oral Daily HOLLI Galvan      hydrOXYzine HCL  50 mg Oral  Q6H PRN Max 4/day [facility] Holter, DO      Or    diphenhydrAMINE  50 mg Intramuscular Q6H PRN Ion  Holter, DO      hydrOXYzine HCL  50 mg Oral Q6H PRN Max 4/day HOLLI Lion      Or    diphenhydrAMINE  50 mg Intramuscular Q6H PRN HOLLI Lion      diphenhydrAMINE-zinc acetate   Topical BID PRN HOLLI Lion      escitalopram  20 mg Oral Daily HOLLI Lion      famotidine  20 mg Oral BID Eileen CherryHOLLI Jimenez      fluticasone  1 spray Each Nare Daily Brina Guillen MD      glycopyrrolate  1 mg Oral TID Bora Rosario MD      haloperidol  1 mg Oral Q6H PRN HOLLI Lion      haloperidol  2.5 mg Oral Q4H PRN Max 4/day HOLLI Lion      haloperidol  5 mg Oral Q4H PRN Max 4/day HOLLI Lion      hydroCHLOROthiazide  12.5 mg Oral Daily HOLLI Galvan      hydrocortisone   Topical 4x Daily PRN HOLLI Lion      hydrOXYzine HCL  100 mg Oral Q6H PRN Max 4/day Ion  Holter, DO      Or    LORazepam  2 mg Intramuscular Q6H PRN Ion  Holter, DO      hydrOXYzine HCL  100 mg Oral Q6H PRN Max 4/day HOLLI Lion      Or    LORazepam  2 mg Intramuscular Q6H PRN HOLLI Lion      hydrOXYzine HCL  25 mg Oral Q6H PRN Max 4/day Ion  Holter, DO      ibuprofen  600 mg Oral Q8H PRN HOLLI Lion      Lidocaine Viscous HCl  15 mL Swish & Spit 4x Daily PRN Bora Rosario MD      loratadine  10 mg Oral Daily Brina Guillen MD      melatonin  3 mg Oral HS [facility] Holter, DO      metFORMIN  500 mg Oral BID With Meals HOLLI Galvan      methocarbamol  500 mg Oral Q6H PRN HOLLI Lion      nicotine polacrilex  2 mg Oral Q4H PRN Bora Rosario MD      OLANZapine  5 mg Oral Q4H PRN Max 3/day [facility] Holter, DO      Or    OLANZapine  2.5 mg Intramuscular Q4H PRN Max 3/day Ion C Holter, DO      OLANZapine  5 mg Oral Q3H PRN Max 3/day Ion FISCHER Holter,       Or    OLANZapine  5 mg Intramuscular Q3H PRN Max 3/day Ion FISCHER Holter, DO       OLANZapine  2.5 mg Oral Q4H PRN Max 6/day Jordan C Holter, DO      ondansetron  4 mg Oral Q6H PRN HOLLI Lion      pantoprazole  20 mg Oral Early Morning Eileen HOLLI Higuera      polyethylene glycol  17 g Oral Daily HOLLI Lion      polyethylene glycol  17 g Oral Daily PRN Jordan C Holter, DO      propranolol  10 mg Oral Q12H KAYLIE HOLLI Lion      senna-docusate sodium  1 tablet Oral Daily PRN Jordan C Holter, DO      senna-docusate sodium  2 tablet Oral BID HOLLI Lion      traZODone  50 mg Oral HS PRN HOLLI Lion      white petrolatum-mineral oil   Topical TID PRN HOLLI Lion           Risks / Benefits of Treatment:    Risks, benefits, and possible side effects of medications explained to patient and patient verbalizes understanding and agreement for treatment.    Counseling / Coordination of Care:    Patient's progress discussed with staff in treatment team meeting.  Medications, treatment progress and treatment plan reviewed with patient.    Note Share    This note was not shared with the patient due to reasonable likelihood of causing patient harm    HOLLI Stoddard 07/20/24

## 2024-07-20 NOTE — PLAN OF CARE
Problem: Alteration in Thoughts and Perception  Goal: Verbalize thoughts and feelings  Description: Interventions:  - Promote a nonjudgmental and trusting relationship with the patient through active listening and therapeutic communication  - Assess patient's level of functioning, behavior and potential for risk  - Engage patient in 1 on 1 interactions  - Encourage patient to express fears, feelings, frustrations, and discuss symptoms    - Ajo patient to reality, help patient recognize reality-based thinking   - Administer medications as ordered and assess for potential side effects  - Provide the patient education related to the signs and symptoms of the illness and desired effects of prescribed medications  Outcome: Progressing  Goal: Agree to be compliant with medication regime, as prescribed and report medication side effects  Description: Interventions:  - Offer appropriate PRN medication and supervise ingestion; conduct AIMS, as needed   Outcome: Progressing  Goal: Attend and participate in unit activities, including therapeutic, recreational, and educational groups  Description: Interventions:  -Encourage Visitation and family involvement in care  Outcome: Progressing  Goal: Complete daily ADLs, including personal hygiene independently, as able  Description: Interventions:  - Observe, teach, and assist patient with ADLS  - Monitor and promote a balance of rest/activity, with adequate nutrition and elimination   Outcome: Progressing     Problem: Alteration in Orientation  Goal: Interact with staff daily  Description: Interventions:  - Assess and re-assess patient's level of orientation  - Engage patient in 1 on 1 interactions, daily, for a minimum of 15 minutes   - Establish rapport/trust with patient   Outcome: Progressing  Goal: Cooperate with recommended testing/procedures  Description: Interventions:  - Determine need for ancillary testing  - Observe for mental status changes  - Implement  falls/precaution protocol   Outcome: Progressing  Goal: Attend and participate in unit activities, including therapeutic, recreational, and educational groups  Description: Interventions:  - Provide therapeutic and educational activities daily, encourage attendance and participation, and document same in the medical record   - Provide appropriate opportunities for reminiscence   - Provide a consistent daily routine   - Encourage family contact/visitation   Outcome: Progressing     Problem: Electroconvulsive therapy (ECT)  Goal: Absence of urinary retention  Description: INTERVENTIONS:  - Assess patient’s ability to void and empty bladder  - Monitor I/O  - Bladder scan as needed  - Discuss with physician/AP medications to alleviate retention as needed  - Discuss catheterization for long term situations as appropriate  Outcome: Progressing  Goal: Minimal or absence of nausea and/or vomiting  Description: INTERVENTIONS:  - Administer IV fluids if ordered to ensure adequate hydration  - Maintain NPO status until nausea and vomiting are resolved  - Nasogastric tube if ordered  - Administer ordered antiemetic medications as needed  - Provide nonpharmacologic comfort measures as appropriate  - Advance diet as tolerated, if ordered  - Consider nutrition services referral to assist patient with adequate nutrition and appropriate food choices  Outcome: Progressing  Goal: Maintains adequate nutritional intake  Description: INTERVENTIONS:  - Monitor percentage of each meal consumed  - Identify factors contributing to decreased intake, treat as appropriate  - Assist with meals as needed  - Monitor I&O, weight, and lab values if indicated  - Obtain nutrition services referral as needed  Outcome: Progressing

## 2024-07-21 PROCEDURE — 99232 SBSQ HOSP IP/OBS MODERATE 35: CPT

## 2024-07-21 RX ORDER — LIDOCAINE HYDROCHLORIDE 20 MG/ML
15 SOLUTION OROPHARYNGEAL 4 TIMES DAILY PRN
Status: DISPENSED | OUTPATIENT
Start: 2024-07-21 | End: 2024-08-20

## 2024-07-21 RX ADMIN — LIDOCAINE HYDROCHLORIDE 15 ML: 20 SOLUTION ORAL at 17:14

## 2024-07-21 RX ADMIN — POLYETHYLENE GLYCOL 3350 17 G: 17 POWDER, FOR SOLUTION ORAL at 08:57

## 2024-07-21 RX ADMIN — ARIPIPRAZOLE 15 MG: 15 TABLET ORAL at 08:57

## 2024-07-21 RX ADMIN — AMLODIPINE BESYLATE 5 MG: 5 TABLET ORAL at 08:57

## 2024-07-21 RX ADMIN — HYDROCHLOROTHIAZIDE 12.5 MG: 12.5 TABLET ORAL at 08:57

## 2024-07-21 RX ADMIN — NICOTINE POLACRILEX 2 MG: 2 GUM, CHEWING ORAL at 08:57

## 2024-07-21 RX ADMIN — GLYCOPYRROLATE 1 MG: 1 TABLET ORAL at 21:14

## 2024-07-21 RX ADMIN — PANTOPRAZOLE SODIUM 20 MG: 20 TABLET, DELAYED RELEASE ORAL at 06:37

## 2024-07-21 RX ADMIN — SENNOSIDES AND DOCUSATE SODIUM 2 TABLET: 8.6; 5 TABLET ORAL at 17:07

## 2024-07-21 RX ADMIN — FAMOTIDINE 20 MG: 20 TABLET ORAL at 08:57

## 2024-07-21 RX ADMIN — METFORMIN HYDROCHLORIDE 500 MG: 500 TABLET, FILM COATED ORAL at 08:57

## 2024-07-21 RX ADMIN — METFORMIN HYDROCHLORIDE 500 MG: 500 TABLET, FILM COATED ORAL at 17:08

## 2024-07-21 RX ADMIN — SENNOSIDES AND DOCUSATE SODIUM 2 TABLET: 8.6; 5 TABLET ORAL at 08:57

## 2024-07-21 RX ADMIN — LIDOCAINE HYDROCHLORIDE 15 ML: 20 SOLUTION ORAL at 10:02

## 2024-07-21 RX ADMIN — FLUTICASONE PROPIONATE 1 SPRAY: 50 SPRAY, METERED NASAL at 09:00

## 2024-07-21 RX ADMIN — CLOZAPINE 500 MG: 100 TABLET ORAL at 21:14

## 2024-07-21 RX ADMIN — NICOTINE POLACRILEX 2 MG: 2 GUM, CHEWING ORAL at 21:14

## 2024-07-21 RX ADMIN — GLYCOPYRROLATE 1 MG: 1 TABLET ORAL at 08:56

## 2024-07-21 RX ADMIN — PROPRANOLOL HYDROCHLORIDE 10 MG: 10 TABLET ORAL at 08:57

## 2024-07-21 RX ADMIN — CYANOCOBALAMIN TAB 1000 MCG 1000 MCG: 1000 TAB at 08:56

## 2024-07-21 RX ADMIN — LORATADINE 10 MG: 10 TABLET ORAL at 08:56

## 2024-07-21 RX ADMIN — PROPRANOLOL HYDROCHLORIDE 10 MG: 10 TABLET ORAL at 21:14

## 2024-07-21 RX ADMIN — MELATONIN TAB 3 MG 3 MG: 3 TAB at 21:14

## 2024-07-21 RX ADMIN — NICOTINE POLACRILEX 2 MG: 2 GUM, CHEWING ORAL at 17:07

## 2024-07-21 RX ADMIN — GLYCOPYRROLATE 1 MG: 1 TABLET ORAL at 17:07

## 2024-07-21 RX ADMIN — FAMOTIDINE 20 MG: 20 TABLET ORAL at 17:07

## 2024-07-21 RX ADMIN — ESCITALOPRAM OXALATE 20 MG: 10 TABLET, FILM COATED ORAL at 08:57

## 2024-07-21 NOTE — NURSING NOTE
Weekly wellness assessment completed. Pt lung sounds clear in all lung fields. Trace edema noted to b/l lower ext. Bowel sounds +x4. B/l pedal pulses papable. Skin intact, warm and color within normal limits for patient. Dry skin noted to b/l feet.

## 2024-07-21 NOTE — NURSING NOTE
Alberto walks around the unit with his peers; quiet and calm but he states he still has anxiety relating to the voices he hears which threaten the safety of him and his family. He uses the Lidocaine swish Viscous for tongue discomfort

## 2024-07-21 NOTE — NURSING NOTE
Alert, cooperative and visible intermittently. Consumed 100% of dinner. Took all medication without prompting. Pt c/o of L side of tongue discomfort. PRN lidocaine viscous administered @ 1714. Maintained on safe precautions without incident.

## 2024-07-21 NOTE — PLAN OF CARE
Problem: Alteration in Thoughts and Perception  Goal: Treatment Goal: Gain control of psychotic behaviors/thinking, reduce/eliminate presenting symptoms and demonstrate improved reality functioning upon discharge  Outcome: Progressing  Goal: Verbalize thoughts and feelings  Description: Interventions:  - Promote a nonjudgmental and trusting relationship with the patient through active listening and therapeutic communication  - Assess patient's level of functioning, behavior and potential for risk  - Engage patient in 1 on 1 interactions  - Encourage patient to express fears, feelings, frustrations, and discuss symptoms    - [place] patient to reality, help patient recognize reality-based thinking   - Administer medications as ordered and assess for potential side effects  - Provide the patient education related to the signs and symptoms of the illness and desired effects of prescribed medications  Outcome: Progressing  Goal: Refrain from acting on delusional thinking/internal stimuli  Description: Interventions:  - Monitor patient closely, per order   - Utilize least restrictive measures   - Set reasonable limits, give positive feedback for acceptable   - Administer medications as ordered and monitor of potential side effects  Outcome: Progressing  Goal: Agree to be compliant with medication regime, as prescribed and report medication side effects  Description: Interventions:  - Offer appropriate PRN medication and supervise ingestion; conduct AIMS, as needed   Outcome: Progressing  Goal: Complete daily ADLs, including personal hygiene independently, as able  Description: Interventions:  - Observe, teach, and assist patient with ADLS  - Monitor and promote a balance of rest/activity, with adequate nutrition and elimination   Outcome: Progressing     Problem: Depression  Goal: Refrain from self-neglect  Outcome: Progressing  Goal: Attend and participate in unit activities, including therapeutic, recreational, and  educational groups  Description: Interventions:  - Provide therapeutic and educational activities daily, encourage attendance and participation, and document same in the medical record   Outcome: Progressing     Problem: Anxiety  Goal: Anxiety is at manageable level  Description: Interventions:  - Assess and monitor patient's anxiety level.   - Monitor for signs and symptoms (heart palpitations, chest pain, shortness of breath, headaches, nausea, feeling jumpy, restlessness, irritable, apprehensive).   - Collaborate with interdisciplinary team and initiate plan and interventions as ordered.  - Benicia patient to unit/surroundings  - Explain treatment plan  - Encourage participation in care  - Encourage verbalization of concerns/fears  - Identify coping mechanisms  - Assist in developing anxiety-reducing skills  - Administer/offer alternative therapies  - Limit or eliminate stimulants  Outcome: Progressing     Problem: Alteration in Orientation  Goal: Interact with staff daily  Description: Interventions:  - Assess and re-assess patient's level of orientation  - Engage patient in 1 on 1 interactions, daily, for a minimum of 15 minutes   - Establish rapport/trust with patient   Outcome: Progressing  Goal: Express concerns related to confused thinking related to:  Description: Interventions:  - Encourage patient to express feelings, fears, frustrations, hopes  - Assign consistent caregivers   - Benicia/re-orient patient as needed  - Allow comfort items, as appropriate  - Provide visual cues, signs, etc.   Outcome: Progressing

## 2024-07-21 NOTE — NURSING NOTE
Alert, cooperative and visible intermittently. No SI or HI noted. Denies depression, anxiety and pain. Did not attend any groups. Consumed of 100% of breakfast and 100% of lunch. Took all medication without prompting. Nicotine gum administered as ordered. Pt c/o of discomfort of L side of tongue. PRN lidocaine viscous administered @ 1002 and was effective.  Maintained on safe precautions without incident. Will continue to monitor progress and recovery program.

## 2024-07-22 PROCEDURE — 99232 SBSQ HOSP IP/OBS MODERATE 35: CPT | Performed by: PSYCHIATRY & NEUROLOGY

## 2024-07-22 RX ADMIN — GLYCOPYRROLATE 1 MG: 1 TABLET ORAL at 10:09

## 2024-07-22 RX ADMIN — LORATADINE 10 MG: 10 TABLET ORAL at 10:10

## 2024-07-22 RX ADMIN — POLYETHYLENE GLYCOL 3350 17 G: 17 POWDER, FOR SOLUTION ORAL at 10:09

## 2024-07-22 RX ADMIN — HYDROCHLOROTHIAZIDE 12.5 MG: 12.5 TABLET ORAL at 10:09

## 2024-07-22 RX ADMIN — AMLODIPINE BESYLATE 5 MG: 5 TABLET ORAL at 10:09

## 2024-07-22 RX ADMIN — FLUTICASONE PROPIONATE 1 SPRAY: 50 SPRAY, METERED NASAL at 10:10

## 2024-07-22 RX ADMIN — CLOZAPINE 500 MG: 100 TABLET ORAL at 21:15

## 2024-07-22 RX ADMIN — NICOTINE POLACRILEX 2 MG: 2 GUM, CHEWING ORAL at 21:15

## 2024-07-22 RX ADMIN — GLYCOPYRROLATE 1 MG: 1 TABLET ORAL at 17:08

## 2024-07-22 RX ADMIN — PANTOPRAZOLE SODIUM 20 MG: 20 TABLET, DELAYED RELEASE ORAL at 06:11

## 2024-07-22 RX ADMIN — FAMOTIDINE 20 MG: 20 TABLET ORAL at 17:07

## 2024-07-22 RX ADMIN — CYANOCOBALAMIN TAB 1000 MCG 1000 MCG: 1000 TAB at 10:10

## 2024-07-22 RX ADMIN — ESCITALOPRAM OXALATE 20 MG: 10 TABLET, FILM COATED ORAL at 10:09

## 2024-07-22 RX ADMIN — GLYCOPYRROLATE 1 MG: 1 TABLET ORAL at 21:15

## 2024-07-22 RX ADMIN — FAMOTIDINE 20 MG: 20 TABLET ORAL at 10:09

## 2024-07-22 RX ADMIN — ARIPIPRAZOLE 15 MG: 15 TABLET ORAL at 10:09

## 2024-07-22 RX ADMIN — SENNOSIDES AND DOCUSATE SODIUM 2 TABLET: 8.6; 5 TABLET ORAL at 10:09

## 2024-07-22 RX ADMIN — NICOTINE POLACRILEX 2 MG: 2 GUM, CHEWING ORAL at 10:09

## 2024-07-22 RX ADMIN — PROPRANOLOL HYDROCHLORIDE 10 MG: 10 TABLET ORAL at 10:10

## 2024-07-22 RX ADMIN — LIDOCAINE HYDROCHLORIDE 15 ML: 20 SOLUTION ORAL at 20:26

## 2024-07-22 RX ADMIN — PROPRANOLOL HYDROCHLORIDE 10 MG: 10 TABLET ORAL at 21:15

## 2024-07-22 RX ADMIN — METFORMIN HYDROCHLORIDE 500 MG: 500 TABLET, FILM COATED ORAL at 10:09

## 2024-07-22 RX ADMIN — NICOTINE POLACRILEX 2 MG: 2 GUM, CHEWING ORAL at 17:07

## 2024-07-22 RX ADMIN — SENNOSIDES AND DOCUSATE SODIUM 2 TABLET: 8.6; 5 TABLET ORAL at 17:11

## 2024-07-22 RX ADMIN — NICOTINE POLACRILEX 2 MG: 2 GUM, CHEWING ORAL at 13:01

## 2024-07-22 RX ADMIN — METFORMIN HYDROCHLORIDE 500 MG: 500 TABLET, FILM COATED ORAL at 17:07

## 2024-07-22 NOTE — PLAN OF CARE
Problem: Ineffective Coping  Goal: Identifies ineffective coping skills  Outcome: Progressing  Goal: Identifies healthy coping skills  Outcome: Progressing  Goal: Demonstrates healthy coping skills  Outcome: Progressing  Goal: Participates in unit activities  Description: Interventions:  - Provide therapeutic environment   - Provide required programming   - Redirect inappropriate behaviors   Outcome: Progressing   Attended 27/44 of the groups offered last week.

## 2024-07-22 NOTE — PROGRESS NOTES
07/22/24 1118   Team Meeting   Meeting Type Tx Team Meeting   Initial Conference Date 07/22/24   Next Conference Date 08/21/24   Team Members Present   Team Members Present Physician;Nurse;   Physician Team Member Abraham   Nursing Team Member Claudia   Social Work Team Member Jenny ORTEGA   Patient/Family Present   Patient Present Yes   Patient's Family Present No     Pt attended review of his tx plan. Pt was tired/groggy but expressed an understanding and acknowledged progress. Pt signed his plan and a copy was placed in pt's chart.

## 2024-07-22 NOTE — NURSING NOTE
Alberto reported feeling depressed today, and mildly anxious.  Reported that he hears voices telling him that they are going to kill his family and himself.    Pt is visible, social, pleasant and cooperative.  Meal and med compliant, consumed only 25% of dinner.  Requested gum throughout the shift.  No unmet needs.  No behavioral issues noted or reported.

## 2024-07-22 NOTE — NURSING NOTE
Pt. was seen in the community room sitting with a group at a table. He was pleasant upon approach and cooperative when receiving his medications, no issues to report. Denies SI/HI

## 2024-07-22 NOTE — PROGRESS NOTES
07/22/24 0729   Team Meeting   Meeting Type Daily Rounds   Team Members Present   Team Members Present Physician;Nurse;;Other (Discipline and Name)   Patient/Family Present   Patient Present No   Patient's Family Present No     In attendance:  Dr. Alex Thomas, MD Dr. Jordan Holter, DO Eveline Hunt, HOLLI Taylor, RN  Luisana Alvarado, [facility]  Carla Lux, [facility]  MATILDA Daniel.S.    Groups: 4/9    Pt reportedly fixated on female peer and demonstrating inappropriate boundaries. Pt social with select peers and generally cooperative. Pt bit his tongue again during ECT.

## 2024-07-22 NOTE — PLAN OF CARE
Problem: Alteration in Thoughts and Perception  Goal: Treatment Goal: Gain control of psychotic behaviors/thinking, reduce/eliminate presenting symptoms and demonstrate improved reality functioning upon discharge  Outcome: Progressing  Goal: Verbalize thoughts and feelings  Description: Interventions:  - Promote a nonjudgmental and trusting relationship with the patient through active listening and therapeutic communication  - Assess patient's level of functioning, behavior and potential for risk  - Engage patient in 1 on 1 interactions  - Encourage patient to express fears, feelings, frustrations, and discuss symptoms    - [place] patient to reality, help patient recognize reality-based thinking   - Administer medications as ordered and assess for potential side effects  - Provide the patient education related to the signs and symptoms of the illness and desired effects of prescribed medications  Outcome: Progressing  Goal: Refrain from acting on delusional thinking/internal stimuli  Description: Interventions:  - Monitor patient closely, per order   - Utilize least restrictive measures   - Set reasonable limits, give positive feedback for acceptable   - Administer medications as ordered and monitor of potential side effects  Outcome: Progressing  Goal: Agree to be compliant with medication regime, as prescribed and report medication side effects  Description: Interventions:  - Offer appropriate PRN medication and supervise ingestion; conduct AIMS, as needed   Outcome: Progressing  Goal: Attend and participate in unit activities, including therapeutic, recreational, and educational groups  Description: Interventions:  -Encourage Visitation and family involvement in care  Outcome: Progressing  Goal: Recognize dysfunctional thoughts, communicate reality-based thoughts at the time of discharge  Description: Interventions:  - Provide medication and psycho-education to assist patient in compliance and developing  insight into his/her illness   Outcome: Progressing  Goal: Complete daily ADLs, including personal hygiene independently, as able  Description: Interventions:  - Observe, teach, and assist patient with ADLS  - Monitor and promote a balance of rest/activity, with adequate nutrition and elimination   Outcome: Progressing     Problem: Ineffective Coping  Goal: Identifies ineffective coping skills  Outcome: Progressing  Goal: Identifies healthy coping skills  Outcome: Progressing  Goal: Demonstrates healthy coping skills  Outcome: Progressing  Goal: Participates in unit activities  Description: Interventions:  - Provide therapeutic environment   - Provide required programming   - Redirect inappropriate behaviors   Outcome: Progressing  Goal: Patient/Family participate in treatment and DC plans  Description: Interventions:  - Provide therapeutic environment  Outcome: Progressing  Goal: Patient/Family verbalizes awareness of resources  Outcome: Progressing     Problem: Depression  Goal: Treatment Goal: Demonstrate behavioral control of depressive symptoms, verbalize feelings of improved mood/affect, and adopt new coping skills prior to discharge  Outcome: Progressing  Goal: Verbalize thoughts and feelings  Description: Interventions:  - Assess and re-assess patient's level of risk   - Engage patient in 1:1 interactions, daily, for a minimum of 15 minutes   - Encourage patient to express feelings, fears, frustrations, hopes   Outcome: Progressing  Goal: Refrain from harming self  Description: Interventions:  - Monitor patient closely, per order   - Supervise medication ingestion, monitor effects and side effects   Outcome: Progressing  Goal: Refrain from isolation  Description: Interventions:  - Develop a trusting relationship   - Encourage socialization   Outcome: Progressing  Goal: Refrain from self-neglect  Outcome: Progressing  Goal: Attend and participate in unit activities, including therapeutic, recreational, and  educational groups  Description: Interventions:  - Provide therapeutic and educational activities daily, encourage attendance and participation, and document same in the medical record   Outcome: Progressing  Goal: Complete daily ADLs, including personal hygiene independently, as able  Description: Interventions:  - Observe, teach, and assist patient with ADLS  -  Monitor and promote a balance of rest/activity, with adequate nutrition and elimination   Outcome: Progressing     Problem: Anxiety  Goal: Anxiety is at manageable level  Description: Interventions:  - Assess and monitor patient's anxiety level.   - Monitor for signs and symptoms (heart palpitations, chest pain, shortness of breath, headaches, nausea, feeling jumpy, restlessness, irritable, apprehensive).   - Collaborate with interdisciplinary team and initiate plan and interventions as ordered.  - [place] patient to unit/surroundings  - Explain treatment plan  - Encourage participation in care  - Encourage verbalization of concerns/fears  - Identify coping mechanisms  - Assist in developing anxiety-reducing skills  - Administer/offer alternative therapies  - Limit or eliminate stimulants  Outcome: Progressing     Problem: Alteration in Orientation  Goal: Treatment Goal: Demonstrate a reduction of confusion and improved orientation to person, place, time and/or situation upon discharge, according to optimum baseline/ability  Outcome: Progressing  Goal: Interact with staff daily  Description: Interventions:  - Assess and re-assess patient's level of orientation  - Engage patient in 1 on 1 interactions, daily, for a minimum of 15 minutes   - Establish rapport/trust with patient   Outcome: Progressing  Goal: Express concerns related to confused thinking related to:  Description: Interventions:  - Encourage patient to express feelings, fears, frustrations, hopes  - Assign consistent caregivers   - [place]/re-orient patient as needed  - Allow comfort items, as  appropriate  - Provide visual cues, signs, etc.   Outcome: Progressing  Goal: Allow medical examinations, as recommended  Description: Interventions:  - Provide physical/neurological exams and/or referrals, per provider   Outcome: Progressing  Goal: Cooperate with recommended testing/procedures  Description: Interventions:  - Determine need for ancillary testing  - Observe for mental status changes  - Implement falls/precaution protocol   Outcome: Progressing  Goal: Attend and participate in unit activities, including therapeutic, recreational, and educational groups  Description: Interventions:  - Provide therapeutic and educational activities daily, encourage attendance and participation, and document same in the medical record   - Provide appropriate opportunities for reminiscence   - Provide a consistent daily routine   - Encourage family contact/visitation   Outcome: Progressing  Goal: Complete daily ADLs, including personal hygiene independently, as able  Description: Interventions:  - Observe, teach, and assist patient with ADLS  Outcome: Progressing     Problem: Electroconvulsive therapy (ECT)  Goal: Treatment Goal: Demonstrate a reduction of confusion and improved orientation to person, place, time and/or situation upon discharge, according to optimum baseline/ability  Outcome: Progressing  Goal: Verbalizes/displays adequate comfort level or baseline comfort level  Description: Interventions:  - Encourage patient to monitor pain and request assistance  - Assess pain using appropriate pain scale  - Administer analgesics based on type and severity of pain and evaluate response  - Implement non-pharmacological measures as appropriate and evaluate response  - Consider cultural and social influences on pain and pain management  - Notify physician/advanced practitioner if interventions unsuccessful or patient reports new pain  Outcome: Progressing  Goal: Achieves stable or improved neurological  status  Description: INTERVENTIONS  - Monitor and report changes in neurological status  - Monitor vital signs such as temperature, blood pressure, glucose, and any other labs ordered   - Initiate measures to prevent increased intracranial pressure  - Monitor for seizure activity and implement precautions if appropriate      Outcome: Progressing  Goal: Achieves maximal functionality and self care  Description: INTERVENTIONS  - Monitor swallowing and airway patency with patient fatigue and changes in neurological status  - Encourage and assist patient to increase activity and self care.   - Encourage visually impaired, hearing impaired and aphasic patients to use assistive/communication devices  Outcome: Progressing  Goal: Maintain or return mobility to safest level of function  Description: INTERVENTIONS:  - Assess patient's ability to carry out ADLs; assess patient's baseline for ADL function and identify physical deficits which impact ability to perform ADLs (bathing, care of mouth/teeth, toileting, grooming, dressing, etc.)  - Assess/evaluate cause of self-care deficits   - Assess range of motion  - Assess patient's mobility  - Assess patient's need for assistive devices and provide as appropriate  - Encourage maximum independence but intervene and supervise when necessary  - Involve family in performance of ADLs  - Assess for home care needs following discharge   - Consider OT consult to assist with ADL evaluation and planning for discharge  - Provide patient education as appropriate  Outcome: Progressing  Goal: Absence of urinary retention  Description: INTERVENTIONS:  - Assess patient’s ability to void and empty bladder  - Monitor I/O  - Bladder scan as needed  - Discuss with physician/AP medications to alleviate retention as needed  - Discuss catheterization for long term situations as appropriate  Outcome: Progressing  Goal: Minimal or absence of nausea and/or vomiting  Description: INTERVENTIONS:  -  Administer IV fluids if ordered to ensure adequate hydration  - Maintain NPO status until nausea and vomiting are resolved  - Nasogastric tube if ordered  - Administer ordered antiemetic medications as needed  - Provide nonpharmacologic comfort measures as appropriate  - Advance diet as tolerated, if ordered  - Consider nutrition services referral to assist patient with adequate nutrition and appropriate food choices  Outcome: Progressing  Goal: Maintains adequate nutritional intake  Description: INTERVENTIONS:  - Monitor percentage of each meal consumed  - Identify factors contributing to decreased intake, treat as appropriate  - Assist with meals as needed  - Monitor I&O, weight, and lab values if indicated  - Obtain nutrition services referral as needed  Outcome: Progressing     Problem: DISCHARGE PLANNING - CARE MANAGEMENT  Goal: Discharge to post-acute care or home with appropriate resources  Description: INTERVENTIONS:  - Conduct assessment to determine patient/family and health care team treatment goals, and need for post-acute services based on payer coverage, community resources, and patient preferences, and barriers to discharge  - Address psychosocial, clinical, and financial barriers to discharge as identified in assessment in conjunction with the patient/family and health care team  - Arrange appropriate level of post-acute services according to patient’s   needs and preference and payer coverage in collaboration with the physician and health care team  - Communicate with and update the patient/family, physician, and health care team regarding progress on the discharge plan  - Arrange appropriate transportation to post-acute venues  Outcome: Progressing

## 2024-07-22 NOTE — PROGRESS NOTES
07/22/24 1252   Team Meeting   Meeting Type Tx Team Meeting   Initial Conference Date 07/22/24   Next Conference Date 08/05/24   Team Members Present   Team Members Present Physician;Nurse;;Other (Discipline and Name)   Physician Team Member Abraham   Nursing Team Member Claudia   Social Work Team Member Jenny ORTEGA   Other (Discipline and Name) Zulma GUTIERREZ. Gaby- MHDS. Su- MHDS   Patient/Family Present   Patient Present Yes   Patient's Family Present No   OTHER   Team Meeting - Additional Comments Pt attended his tx team meeting. Pt was groggy/drowsy after having just woken up. Pt did not bring his completed self-assessment. Provider addressed pt's recent inappropriate bx with female peer. Pt reports ongoing voices and is continuing ECT maintenance. ACT referral will be submitted. Current discharge disposition - home with aunt.

## 2024-07-22 NOTE — PROGRESS NOTES
Psychiatry Progress Note [facility]    Alberto Berumen 28 y.o. male MRN: [id number]  Unit/Bed#: [facility] 108-02 Encounter: [id number]  Code Status: Level 1 - Full Code    PCP: Joce Juan MD    Date of Admission:  3/29/2024 2008   Date of Service:  07/22/24    Patient Active Problem List   Diagnosis    GERD (gastroesophageal reflux disease)    Medical clearance for psychiatric admission    Schizoaffective disorder, bipolar type (HCC)    Tobacco abuse    T wave inversion in EKG    Syringoma    Chronic idiopathic constipation    Vitamin B 12 deficiency    Vitamin D deficiency    Confluent and reticulate papillomatosis    Class 2 obesity in adult    Primary hypertension    Elevated hemoglobin A1c    Bilateral lower extremity edema         Review of systems: He is still using lidocaine swish in his mouth after again allegedly biting his tongue during ECT again on Friday otherwise unremarkable.   Psychiatric Diagnosis: Schizoaffective bipolar    Assessment  Overall Status: He still reports the same threatening voices but they are less after ECT on Friday. He is feeling very fatigued. Had treatment team this morning.   Certification Statement: The patient will continue to require additional inpatient hospital stay due to ongoing threatening voices with lack of strong response from medications and ECT so far       Medications:   - Clozapine 500mg PO at bedtime  - Propranolol 10mg PO every 12 hours as needed for anxiety  - Abilify 15mg PO at bedtime  - Lexapro 20mg PO daily  - Atropine eyedrops sublingual for drooling of saliva  - Senna 2 tablets twice a day for constipation  Side effects from treatment: none  Medication changes   none   Medication education   Risks side effects benefits and precautions of medications discussed with patient and he did verbalize an understanding about risks for metabolic syndrome from being on neuroleptics and is form tardive dyskinesia etc.  All medications reviewed  and I recommend that they be continued for symptom management   Understanding of medications: some understanding   Justification for dual anti-psychotics: n/a    Non-pharmacological treatments  Continue with individual, group, milieu and occupational therapy using recovery principles and psycho-education about accepting illness and the need for treatment.  Continue ECT q2 weekly for maintenance x 6  Refused to see dietician. Agreeable to more exercises for obesity.   Plan to explore ACT team with discussion with aunt as well  Risperdal was replaced with Abilify due to previously high prolactin level    Safety  Safety and communication plan established to target dynamic risk factors discussed above.    Discharge Plan   Plan is to discharge back to his aunt with ACT team once ECT is completed.    Interval Progress   He was laying in bed this morning and had to be awoken for treatment team. He has been very sleepy and skipped breakfast this morning. He had bit his tongue again during ECT on Friday and has been using lidocaine swish. He is still reporting the same threatening voices to kill him and his family but ECT has lessened the voices. The voices are not commanding. He interacts with select peers and has been getting close to another peer and needs to be reminded about boundaries with peer. He has been compliant with medications.   Acceptance by patient: accepting  Hopefulness in recovery: living with aunt and sister  Involved in reintegration process: aunt, sister   Trusting in relationship with psychiatrist: trusting  Sleep: good  Appetite: overall good but skipped breakfast this morning  Compliance with Medications: compliant  Group attendance: most  Significant events: He had labs done on Friday and ECT on Friday. States voices are less but still present. Continues to be pleasant and cooperative. Needs reminders for boundaries with select peers and not getting too close to them.       Mental Status  Exam  Appearance: marginal hygiene, dressed in hospital attire, overweight, dreadlocked hair  Behavior: cooperative, calm, slow responses  Speech: normal volume, scant, soft  Mood: dysphoric, anxious, feels very fatigued but overall feels better with ECT treatment on Friday  Affect: constricted, mood-congruent  Thought Process: organized, coherent, decreased rate of thoughts  Thought Content: paranoid ideation, intrusive thoughts, preoccupied. He has paranoia due to voices he hears that are threatening to kill him and his family. He states they are less after ECT. He does not verbalize any suicidal or homicidal thoughts. Denies phobias, obsessions, compulsions, distorted body perceptions.  Perceptual Disturbances: auditory hallucinations of threatening voices that tell him they are going to kill him and his family, denies commanding voices, voices are less after ECT on Friday  Hx Risk Factors: chronic psychiatric problems, chronic anxiety symptoms, chronic psychotic symptoms  Sensorium: alert and drowsy  Cognition: recent and remote memory grossly intact  Consciousness: alert and sleepy  Attention: attention span and concentration are age appropriate  Intellect: appears to be of average intelligence  Insight: intact  Judgement: intact  Motor Activity: no abnormal movements     Vitals  Temp:  [97.8 °F (36.6 °C)-98 °F (36.7 °C)] 97.8 °F (36.6 °C)  HR:  [] 89  Resp:  [18] 18  BP: (128-134)/(77-82) 128/77  SpO2:  [97 %-98 %] 97 %  No intake or output data in the 24 hours ending 07/22/24 0828    Lab Results: All Labs Within Last 24 Hours Reviewed  Results from last 7 days   Lab Units 07/18/24  2113   WBC Thousand/uL 12.00*   RBC Million/uL 5.52   HEMOGLOBIN g/dL 13.0   HEMATOCRIT % 43.7   MCV fL 79*   PLATELETS Thousands/uL 265   TOTAL NEUT ABS Thousands/µL 6.46   CLOZAPINE LVL ng/mL 451       Current Facility-Administered Medications   Medication Dose Route Frequency Provider Last Rate    acetaminophen  650 mg  Oral Q4H PRN Jordan C Holter, DO      acetaminophen  650 mg Oral Q6H PRN HOLLI Lion      aluminum-magnesium hydroxide-simethicone  30 mL Oral Q4H PRN Jordan C Holter, DO      amLODIPine  5 mg Oral Daily HOLLI Lion      ARIPiprazole  15 mg Oral Daily Bora Rosario MD      Artificial Tears  1 drop Both Eyes Q3H PRN Jordan C Holter, DO      atropine  1 drop Sublingual Daily PRN HOLLI Lion      haloperidol lactate  2.5 mg Intramuscular Q4H PRN Max 4/day STEVE LionNP      And    LORazepam  1 mg Intramuscular Q4H PRN Max 4/day STEVE LionNP      And    benztropine  0.5 mg Intramuscular Q4H PRN Max 4/day HOLLI Lion      benztropine  1 mg Intramuscular Q4H PRN Max 6/day Jordan C Holter, DO      haloperidol lactate  5 mg Intramuscular Q4H PRN Max 4/day HOLLI Lion      And    LORazepam  2 mg Intramuscular Q4H PRN Max 4/day HOLLI Lion      And    benztropine  1 mg Intramuscular Q4H PRN Max 4/day HOLLI Lion      benztropine  1 mg Oral Q4H PRN Max 6/day HOLLI Lion      benztropine  1 mg Oral Q4H PRN Max 6/day Jordan C Holter, DO      bisacodyl  10 mg Rectal Daily PRN HOLLI Lion      calcium carbonate  500 mg Oral BID PRN HOLLI Monge      cloZAPine  500 mg Oral HS Bora Rosario MD      cyanocobalamin  1,000 mcg Oral Daily HOLLI Galvan      hydrOXYzine HCL  50 mg Oral Q6H PRN Max 4/day Jordan C Holter, DO      Or    diphenhydrAMINE  50 mg Intramuscular Q6H PRN Jordan C Holter, DO      hydrOXYzine HCL  50 mg Oral Q6H PRN Max 4/day HOLLI Lion      Or    diphenhydrAMINE  50 mg Intramuscular Q6H PRN HOLLI Lion      diphenhydrAMINE-zinc acetate   Topical BID PRN HOLLI Lion      escitalopram  20 mg Oral Daily HOLLI Lion      famotidine  20 mg Oral BID HOLLI Monge      fluticasone  1 spray Each Nare Daily Brina Guillen MD      glycopyrrolate  1 mg Oral TID Bora Rosario MD       haloperidol  1 mg Oral Q6H PRN HOLLI Lion      haloperidol  2.5 mg Oral Q4H PRN Max 4/day HOLLI Lion      haloperidol  5 mg Oral Q4H PRN Max 4/day HOLLI Lion      hydroCHLOROthiazide  12.5 mg Oral Daily HOLLI Galvan      hydrocortisone   Topical 4x Daily PRN HOLLI Lion      hydrOXYzine HCL  100 mg Oral Q6H PRN Max 4/day [facility] Holter, DO      Or    LORazepam  2 mg Intramuscular Q6H PRN [facility] Holter, DO      hydrOXYzine HCL  100 mg Oral Q6H PRN Max 4/day HOLLI Lion      Or    LORazepam  2 mg Intramuscular Q6H PRN HOLLI Lion      hydrOXYzine HCL  25 mg Oral Q6H PRN Max 4/day [facility] Holter, DO      ibuprofen  600 mg Oral Q8H PRN HOLLI Lion      Lidocaine Viscous HCl  15 mL Swish & Spit 4x Daily PRN HOLLI Galvan      loratadine  10 mg Oral Daily Brina Guillen MD      melatonin  3 mg Oral HS [facility] Holter, DO      metFORMIN  500 mg Oral BID With Meals HOLLI Galvan      methocarbamol  500 mg Oral Q6H PRN HOLLI Lion      nicotine polacrilex  2 mg Oral Q4H PRN Bora Rosario MD      OLANZapine  5 mg Oral Q4H PRN Max 3/day [facility] Holter, DO      Or    OLANZapine  2.5 mg Intramuscular Q4H PRN Max 3/day [facility] Holter, DO      OLANZapine  5 mg Oral Q3H PRN Max 3/day [facility] Holter, DO      Or    OLANZapine  5 mg Intramuscular Q3H PRN Max 3/day [facility] Holter, DO      OLANZapine  2.5 mg Oral Q4H PRN Max 6/day [facility] Holter, DO      ondansetron  4 mg Oral Q6H PRN HOLLI Lion      pantoprazole  20 mg Oral Early Morning HOLLI Monge      polyethylene glycol  17 g Oral Daily HOLLI Lion      polyethylene glycol  17 g Oral Daily PRN [facility] Holter, DO      propranolol  10 mg Oral Q12H KAYLIE HOLLI Lion      senna-docusate sodium  1 tablet Oral Daily PRN Jordan C Holter, DO senna-docusate sodium  2 tablet Oral BID HOLLI Lion      traZODone  50 mg Oral HS PRN Eveline Hunt,  HOLLI      white petrolatum-mineral oil   Topical TID PRHOLLI Augustin         Counseling / Coordination of Care: Total floor / unit time spent today 15 minutes. Greater than 50% of total time was spent with the patient and / or family counseling and / or somewhat receptive to supportive listening and teaching positive coping skills to deal with symptom mangement.     Patient's Rights, confidentiality and exceptions to confidentiality, use of automated medical record, [facility] staff access to medical record, and consent to treatment reviewed.    This note has been dictated and hence there may be problems with punctuation, spelling and formatting and if anyone has any concerns please address them to Dr. Rosario   This note is not shared with patient due to potential for making patient's condition worse by knowing the content of the note.

## 2024-07-22 NOTE — NURSING NOTE
"Pt is accepting of medications without incidence and meal compliant. Pt is visible in the milieu and social with select few peers. Pt reports AH as \"nothing new\" and denies all other s/s. Pt is polite and pleasant in conversation and appropriate in all peers interactions. No new concerns.   "

## 2024-07-23 PROCEDURE — 99232 SBSQ HOSP IP/OBS MODERATE 35: CPT | Performed by: PSYCHIATRY & NEUROLOGY

## 2024-07-23 RX ADMIN — POLYETHYLENE GLYCOL 3350 17 G: 17 POWDER, FOR SOLUTION ORAL at 08:47

## 2024-07-23 RX ADMIN — NICOTINE POLACRILEX 2 MG: 2 GUM, CHEWING ORAL at 17:10

## 2024-07-23 RX ADMIN — CLOZAPINE 500 MG: 100 TABLET ORAL at 21:14

## 2024-07-23 RX ADMIN — AMLODIPINE BESYLATE 5 MG: 5 TABLET ORAL at 08:46

## 2024-07-23 RX ADMIN — NICOTINE POLACRILEX 2 MG: 2 GUM, CHEWING ORAL at 21:14

## 2024-07-23 RX ADMIN — METFORMIN HYDROCHLORIDE 500 MG: 500 TABLET, FILM COATED ORAL at 08:46

## 2024-07-23 RX ADMIN — METFORMIN HYDROCHLORIDE 500 MG: 500 TABLET, FILM COATED ORAL at 17:10

## 2024-07-23 RX ADMIN — LIDOCAINE HYDROCHLORIDE 15 ML: 20 SOLUTION ORAL at 09:40

## 2024-07-23 RX ADMIN — FAMOTIDINE 20 MG: 20 TABLET ORAL at 08:46

## 2024-07-23 RX ADMIN — SENNOSIDES AND DOCUSATE SODIUM 2 TABLET: 8.6; 5 TABLET ORAL at 08:45

## 2024-07-23 RX ADMIN — HYDROCHLOROTHIAZIDE 12.5 MG: 12.5 TABLET ORAL at 08:46

## 2024-07-23 RX ADMIN — ESCITALOPRAM OXALATE 20 MG: 10 TABLET, FILM COATED ORAL at 08:46

## 2024-07-23 RX ADMIN — NICOTINE POLACRILEX 2 MG: 2 GUM, CHEWING ORAL at 12:24

## 2024-07-23 RX ADMIN — PANTOPRAZOLE SODIUM 20 MG: 20 TABLET, DELAYED RELEASE ORAL at 06:13

## 2024-07-23 RX ADMIN — LORATADINE 10 MG: 10 TABLET ORAL at 08:46

## 2024-07-23 RX ADMIN — GLYCOPYRROLATE 1 MG: 1 TABLET ORAL at 17:10

## 2024-07-23 RX ADMIN — CYANOCOBALAMIN TAB 1000 MCG 1000 MCG: 1000 TAB at 08:45

## 2024-07-23 RX ADMIN — ARIPIPRAZOLE 15 MG: 15 TABLET ORAL at 08:46

## 2024-07-23 RX ADMIN — FAMOTIDINE 20 MG: 20 TABLET ORAL at 17:10

## 2024-07-23 RX ADMIN — GLYCOPYRROLATE 1 MG: 1 TABLET ORAL at 08:46

## 2024-07-23 RX ADMIN — GLYCOPYRROLATE 1 MG: 1 TABLET ORAL at 21:14

## 2024-07-23 RX ADMIN — PROPRANOLOL HYDROCHLORIDE 10 MG: 10 TABLET ORAL at 08:46

## 2024-07-23 RX ADMIN — SENNOSIDES AND DOCUSATE SODIUM 2 TABLET: 8.6; 5 TABLET ORAL at 17:10

## 2024-07-23 RX ADMIN — PROPRANOLOL HYDROCHLORIDE 10 MG: 10 TABLET ORAL at 21:14

## 2024-07-23 NOTE — NURSING NOTE
Pt was in bed all morning, refused breakfast. Pt calm and cooperative with assessment, reports AH voices telling him they are going to kill him. Pt denies SI/HI. Pt ate 75% for lunch, medication complaint, safety checks ongoing.

## 2024-07-23 NOTE — NURSING NOTE
Received pt in bed at change of shift with eyes closed; chest movement noted.  Continues the same thus this far as per q 7 min room checks.   Will continue to monitor behavior, sleeping pattern and any medical issues that may arise.    0546:  Sleeping 7+ hrs thus this far

## 2024-07-23 NOTE — PLAN OF CARE
Problem: Alteration in Thoughts and Perception  Goal: Treatment Goal: Gain control of psychotic behaviors/thinking, reduce/eliminate presenting symptoms and demonstrate improved reality functioning upon discharge  Outcome: Progressing  Goal: Verbalize thoughts and feelings  Description: Interventions:  - Promote a nonjudgmental and trusting relationship with the patient through active listening and therapeutic communication  - Assess patient's level of functioning, behavior and potential for risk  - Engage patient in 1 on 1 interactions  - Encourage patient to express fears, feelings, frustrations, and discuss symptoms    - [place] patient to reality, help patient recognize reality-based thinking   - Administer medications as ordered and assess for potential side effects  - Provide the patient education related to the signs and symptoms of the illness and desired effects of prescribed medications  Outcome: Progressing  Goal: Refrain from acting on delusional thinking/internal stimuli  Description: Interventions:  - Monitor patient closely, per order   - Utilize least restrictive measures   - Set reasonable limits, give positive feedback for acceptable   - Administer medications as ordered and monitor of potential side effects  Outcome: Progressing  Goal: Agree to be compliant with medication regime, as prescribed and report medication side effects  Description: Interventions:  - Offer appropriate PRN medication and supervise ingestion; conduct AIMS, as needed   Outcome: Progressing  Goal: Attend and participate in unit activities, including therapeutic, recreational, and educational groups  Description: Interventions:  -Encourage Visitation and family involvement in care  Outcome: Progressing  Goal: Recognize dysfunctional thoughts, communicate reality-based thoughts at the time of discharge  Description: Interventions:  - Provide medication and psycho-education to assist patient in compliance and developing  insight into his/her illness   Outcome: Progressing  Goal: Complete daily ADLs, including personal hygiene independently, as able  Description: Interventions:  - Observe, teach, and assist patient with ADLS  - Monitor and promote a balance of rest/activity, with adequate nutrition and elimination   Outcome: Progressing     Problem: Ineffective Coping  Goal: Participates in unit activities  Description: Interventions:  - Provide therapeutic environment   - Provide required programming   - Redirect inappropriate behaviors   Outcome: Progressing  Goal: Patient/Family participate in treatment and DC plans  Description: Interventions:  - Provide therapeutic environment  Outcome: Progressing  Goal: Patient/Family verbalizes awareness of resources  Outcome: Progressing     Problem: Depression  Goal: Treatment Goal: Demonstrate behavioral control of depressive symptoms, verbalize feelings of improved mood/affect, and adopt new coping skills prior to discharge  Outcome: Progressing  Goal: Verbalize thoughts and feelings  Description: Interventions:  - Assess and re-assess patient's level of risk   - Engage patient in 1:1 interactions, daily, for a minimum of 15 minutes   - Encourage patient to express feelings, fears, frustrations, hopes   Outcome: Progressing  Goal: Refrain from harming self  Description: Interventions:  - Monitor patient closely, per order   - Supervise medication ingestion, monitor effects and side effects   Outcome: Progressing  Goal: Refrain from isolation  Description: Interventions:  - Develop a trusting relationship   - Encourage socialization   Outcome: Progressing  Goal: Refrain from self-neglect  Outcome: Progressing  Goal: Attend and participate in unit activities, including therapeutic, recreational, and educational groups  Description: Interventions:  - Provide therapeutic and educational activities daily, encourage attendance and participation, and document same in the medical record    Outcome: Progressing  Goal: Complete daily ADLs, including personal hygiene independently, as able  Description: Interventions:  - Observe, teach, and assist patient with ADLS  -  Monitor and promote a balance of rest/activity, with adequate nutrition and elimination   Outcome: Progressing     Problem: Anxiety  Goal: Anxiety is at manageable level  Description: Interventions:  - Assess and monitor patient's anxiety level.   - Monitor for signs and symptoms (heart palpitations, chest pain, shortness of breath, headaches, nausea, feeling jumpy, restlessness, irritable, apprehensive).   - Collaborate with interdisciplinary team and initiate plan and interventions as ordered.  - [place] patient to unit/surroundings  - Explain treatment plan  - Encourage participation in care  - Encourage verbalization of concerns/fears  - Identify coping mechanisms  - Assist in developing anxiety-reducing skills  - Administer/offer alternative therapies  - Limit or eliminate stimulants  Outcome: Progressing     Problem: Alteration in Orientation  Goal: Interact with staff daily  Description: Interventions:  - Assess and re-assess patient's level of orientation  - Engage patient in 1 on 1 interactions, daily, for a minimum of 15 minutes   - Establish rapport/trust with patient   Outcome: Progressing  Goal: Allow medical examinations, as recommended  Description: Interventions:  - Provide physical/neurological exams and/or referrals, per provider   Outcome: Progressing     Problem: Electroconvulsive therapy (ECT)  Goal: Verbalizes/displays adequate comfort level or baseline comfort level  Description: Interventions:  - Encourage patient to monitor pain and request assistance  - Assess pain using appropriate pain scale  - Administer analgesics based on type and severity of pain and evaluate response  - Implement non-pharmacological measures as appropriate and evaluate response  - Consider cultural and social influences on pain and pain  management  - Notify physician/advanced practitioner if interventions unsuccessful or patient reports new pain  Outcome: Progressing  Goal: Achieves stable or improved neurological status  Description: INTERVENTIONS  - Monitor and report changes in neurological status  - Monitor vital signs such as temperature, blood pressure, glucose, and any other labs ordered   - Initiate measures to prevent increased intracranial pressure  - Monitor for seizure activity and implement precautions if appropriate      Outcome: Progressing  Goal: Maintain or return mobility to safest level of function  Description: INTERVENTIONS:  - Assess patient's ability to carry out ADLs; assess patient's baseline for ADL function and identify physical deficits which impact ability to perform ADLs (bathing, care of mouth/teeth, toileting, grooming, dressing, etc.)  - Assess/evaluate cause of self-care deficits   - Assess range of motion  - Assess patient's mobility  - Assess patient's need for assistive devices and provide as appropriate  - Encourage maximum independence but intervene and supervise when necessary  - Involve family in performance of ADLs  - Assess for home care needs following discharge   - Consider OT consult to assist with ADL evaluation and planning for discharge  - Provide patient education as appropriate  Outcome: Progressing  Goal: Minimal or absence of nausea and/or vomiting  Description: INTERVENTIONS:  - Administer IV fluids if ordered to ensure adequate hydration  - Maintain NPO status until nausea and vomiting are resolved  - Nasogastric tube if ordered  - Administer ordered antiemetic medications as needed  - Provide nonpharmacologic comfort measures as appropriate  - Advance diet as tolerated, if ordered  - Consider nutrition services referral to assist patient with adequate nutrition and appropriate food choices  Outcome: Progressing     Problem: DISCHARGE PLANNING - CARE MANAGEMENT  Goal: Discharge to post-acute  care or home with appropriate resources  Description: INTERVENTIONS:  - Conduct assessment to determine patient/family and health care team treatment goals, and need for post-acute services based on payer coverage, community resources, and patient preferences, and barriers to discharge  - Address psychosocial, clinical, and financial barriers to discharge as identified in assessment in conjunction with the patient/family and health care team  - Arrange appropriate level of post-acute services according to patient’s   needs and preference and payer coverage in collaboration with the physician and health care team  - Communicate with and update the patient/family, physician, and health care team regarding progress on the discharge plan  - Arrange appropriate transportation to post-acute venues  Outcome: Progressing

## 2024-07-23 NOTE — PROGRESS NOTES
07/23/24 0751   Team Meeting   Meeting Type Daily Rounds   Team Members Present   Team Members Present Physician;Nurse;;Other (Discipline and Name)   Patient/Family Present   Patient Present No   Patient's Family Present No     In attendance:  Dr. Alex Thomas, MD Dr. Jordan Holter, DO Guy Taylor, RN  Luisana Alvarado, [facility]  Irais Mcdowell M.S.    Groups: 6/8    Pt had poor appetite yesterday. Pt was more isolative in between groups. Pt reports ongoing AH, depression, anxiety.   29 ECTs completed, 33 authorized. 4 remaining on 8/2, 8/16, 8/30, 9/13

## 2024-07-23 NOTE — SOCIAL WORK
Pt's aunt Hanh called and confirmed she would like to come in person to visit pt. Aunt will be arriving between 10/10:30 Saturday 7/27 for a 30 minute visit and will likely be bringing another family member that pt is close to. Aunt was reminded visitors must be family and a legal adult.

## 2024-07-23 NOTE — PROGRESS NOTES
Psychiatry Progress Note [facility]    Alberto Berumen 28 y.o. male MRN: [id number]  Unit/Bed#: [facility] 108-02 Encounter: [id number]  Code Status: Level 1 - Full Code    PCP: Joce Juan MD    Date of Admission:  3/29/2024 2008   Date of Service:  07/23/24    Patient Active Problem List   Diagnosis    GERD (gastroesophageal reflux disease)    Medical clearance for psychiatric admission    Schizoaffective disorder, bipolar type (HCC)    Tobacco abuse    T wave inversion in EKG    Syringoma    Chronic idiopathic constipation    Vitamin B 12 deficiency    Vitamin D deficiency    Confluent and reticulate papillomatosis    Class 2 obesity in adult    Primary hypertension    Elevated hemoglobin A1c    Bilateral lower extremity edema         Review of systems: He is still using lidocaine swish in his mouth after allegedly biting his tongue again during ECT on Friday otherwise unremarkable. He is also fatigued and reporting poor appetite. Needs reminders about getting too close with peers.   Psychiatric Diagnosis: Schizoaffective bipolar    Assessment  Overall Status: He still reports the same threatening voices but they are better after ECT on Friday. He is feeling very tired and states he does not have an appetite.   Certification Statement: The patient will continue to require additional inpatient hospital stay due to ongoing threatening voices with lack of strong response from medications and ECT so far       Medications:   - Clozapine 500mg PO at bedtime  - Propranolol 10mg PO very 12 hours as needed for anxiety  - Abilify 15mg PO at bedtime  - Lexapro 20mg PO daily  - Atropine eyedrops sublingual for drooling of saliva  - Senna 2 tablets twice a day for constipation  Side effects from treatment: none  Medication changes   none   Medication education   Risks side effects benefits and precautions of medications discussed with patient and he did verbalize an understanding about risks for metabolic  syndrome from being on neuroleptics and is form tardive dyskinesia etc.  All medications reviewed and I recommend that they be continued for symptom management   Understanding of medications: some understanding   Justification for dual anti-psychotics: n/a    Non-pharmacological treatments  Continue with individual, group, milieu and occupational therapy using recovery principles and psycho-education about accepting illness and the need for treatment.  Continue ECT q2 weekly for maintenace x 6  Refused to see dietician. Agreeable to more exercises for obesity.  Plan to explore ACT team with discussion with aunt  Diane was replaced with Abilify due to previously high prolactin level    Safety  Safety and communication plan established to target dynamic risk factors discussed above.    Discharge Plan   Plan is to discharge back to his aunt with ACT team once ECT is completed    Interval Progress   He was laying in bed this morning and is feeling very tired and sleeping a lot. He has had a poor appetite the past few days and skipped breakfast again this morning. He has to be reminded about boundaries with select peers. He bit his tongue again during ECT on Friday and has been using lidocaine swish. He is still reporting the same threatening voices to kill him and his family but they are better after ECT treatments. He has been compliant with medications. He needed no behavioral PRNs in the past 24 hours. He attended 6/8 groups yesterday.   Acceptance by patient: accepting  Hopefulness in recovery: living with aunt and sister  Involved in reintegration process: aunt, sister  Trusting in relationship with psychiatrist: trusting  Sleep: good  Appetite: poor for the past few days, 0/25/25 yesterday and skipped breakfast this morning  Compliance with Medications: compliant  Group attendance: 6/8 groups yesterday  Significant events: He had ECT on Friday. States voices are less but still present and threatening. He  continues to be pleasant and cooperative but has been more isolative recently. He needs reminders about boundaries and not getting too close to select peers. He has 4 ECT treatments left.       Mental Status Exam  Appearance: marginal hygiene, dressed in hospital attire, overweight, dreadlocked hair, sleeping in bed  Behavior: cooperative, calm, slow responses  Speech: slow, scant, soft  Mood: depressed, dysphoric, anxious  Affect: constricted, mood-congruent  Thought Process: organized, coherent, decreased rate of thoughts  Thought Content: paranoid ideation, intrusive thoughts, preoccupied, paranoia due to voices he hears that are threatening to kill him and his family, he denies any suicidal or homicidal thoughts, denies phobias, obsessions, compulsions, distorted body perceptions  Perceptual Disturbances: no visual hallucinations, does not appear responding to internal stimuli, auditory hallucinations of threatening voices that tell him they are going to kill him and his family, denies commanding voices, states voices are less after ECT treatments  Hx Risk Factors: chronic psychiatric problems, chronic anxiety symptoms, chronic psychotic symptoms  Sensorium: alert and drowsy  Cognition: recent and remote memory grossly intact  Consciousness: alert and sleepy  Attention: attention span and concentration are age appropriate  Intellect: appears to be of average intelligence  Insight: intact  Judgement: intact  Motor Activity: no abnormal movements     Vitals  Temp:  [97.5 °F (36.4 °C)-97.8 °F (36.6 °C)] 97.5 °F (36.4 °C)  HR:  [] 80  Resp:  [18] 18  BP: (123-160)/(70-92) 123/70  SpO2:  [98 %-100 %] 100 %  No intake or output data in the 24 hours ending 07/23/24 0937    Lab Results: All Labs Within Last 24 Hours Reviewed  Results from last 7 days   Lab Units 07/18/24  2113   WBC Thousand/uL 12.00*   RBC Million/uL 5.52   HEMOGLOBIN g/dL 13.0   HEMATOCRIT % 43.7   MCV fL 79*   PLATELETS Thousands/uL 265   TOTAL  NEUT ABS Thousands/µL 6.46   CLOZAPINE LVL ng/mL 451       Current Facility-Administered Medications   Medication Dose Route Frequency Provider Last Rate    acetaminophen  650 mg Oral Q4H PRN Jordan C Holter, DO      acetaminophen  650 mg Oral Q6H PRN HOLLI Lion      aluminum-magnesium hydroxide-simethicone  30 mL Oral Q4H PRN Jordan C Holter, DO      amLODIPine  5 mg Oral Daily HOLLI Lion      ARIPiprazole  15 mg Oral Daily Bora Rosario MD      Artificial Tears  1 drop Both Eyes Q3H PRN Jordan C Holter, DO      atropine  1 drop Sublingual Daily PRN HOLLI Lion      haloperidol lactate  2.5 mg Intramuscular Q4H PRN Max 4/day HOLLI Lion      And    LORazepam  1 mg Intramuscular Q4H PRN Max 4/day HOLLI Lion      And    benztropine  0.5 mg Intramuscular Q4H PRN Max 4/day HOLLI Lion      benztropine  1 mg Intramuscular Q4H PRN Max 6/day Jordan C Holter, DO      haloperidol lactate  5 mg Intramuscular Q4H PRN Max 4/day HOLLI Lion      And    LORazepam  2 mg Intramuscular Q4H PRN Max 4/day HOLLI Lion      And    benztropine  1 mg Intramuscular Q4H PRN Max 4/day HOLLI Lion      benztropine  1 mg Oral Q4H PRN Max 6/day HOLLI Lion      benztropine  1 mg Oral Q4H PRN Max 6/day Jordan C Holter, DO      bisacodyl  10 mg Rectal Daily PRN HOLLI Lion      calcium carbonate  500 mg Oral BID PRN HOLLI Monge      cloZAPine  500 mg Oral HS Bora Rosario MD      cyanocobalamin  1,000 mcg Oral Daily HOLLI Galvan      hydrOXYzine HCL  50 mg Oral Q6H PRN Max 4/day Jordan C Holter, DO      Or    diphenhydrAMINE  50 mg Intramuscular Q6H PRN Jordan C Holter, DO      hydrOXYzine HCL  50 mg Oral Q6H PRN Max 4/day HOLLI Lion      Or    diphenhydrAMINE  50 mg Intramuscular Q6H PRN HOLLI Lion      diphenhydrAMINE-zinc acetate   Topical BID PRN HOLLI Lion      escitalopram  20 mg Oral Daily Eveline Hunt,  HOLLI      famotidine  20 mg Oral BID HOLLI Monge      fluticasone  1 spray Each Nare Daily Brina Guillen MD      glycopyrrolate  1 mg Oral TID Bora Rosario MD      haloperidol  1 mg Oral Q6H PRN HOLLI Lion      haloperidol  2.5 mg Oral Q4H PRN Max 4/day HOLLI Lion      haloperidol  5 mg Oral Q4H PRN Max 4/day HOLLI Lion      hydroCHLOROthiazide  12.5 mg Oral Daily HOLLI Galvan      hydrocortisone   Topical 4x Daily PRN HOLLI Lion      hydrOXYzine HCL  100 mg Oral Q6H PRN Max 4/day [facility] Holter, DO      Or    LORazepam  2 mg Intramuscular Q6H PRN [facility] Holter, DO      hydrOXYzine HCL  100 mg Oral Q6H PRN Max 4/day HOLLI Lion      Or    LORazepam  2 mg Intramuscular Q6H PRN HOLLI Lion      hydrOXYzine HCL  25 mg Oral Q6H PRN Max 4/day [facility] Holter, DO      ibuprofen  600 mg Oral Q8H PRN HOLLI Lion      Lidocaine Viscous HCl  15 mL Swish & Spit 4x Daily PRN HOLLI Galvan      loratadine  10 mg Oral Daily Brina Guillen MD      melatonin  3 mg Oral HS PRN Bora Rosario MD      metFORMIN  500 mg Oral BID With Meals HOLLI Galvan      methocarbamol  500 mg Oral Q6H PRN HOLLI Lion      nicotine polacrilex  2 mg Oral Q4H PRN Bora Rosario MD      OLANZapine  5 mg Oral Q4H PRN Max 3/day Ion  Holter, DO      Or    OLANZapine  2.5 mg Intramuscular Q4H PRN Max 3/day [facility] Holter, DO      OLANZapine  5 mg Oral Q3H PRN Max 3/day [facility] Holter, DO      Or    OLANZapine  5 mg Intramuscular Q3H PRN Max 3/day [facility] Holter, DO      OLANZapine  2.5 mg Oral Q4H PRN Max 6/day Ion  Holter, DO      ondansetron  4 mg Oral Q6H PRN HOLLI Lion      pantoprazole  20 mg Oral Early Morning HOLLI Monge      polyethylene glycol  17 g Oral Daily HOLLI Lion      polyethylene glycol  17 g Oral Daily PRN Jordan C Holter, DO      propranolol  10 mg Oral Q12H [facility] HOLLI Lion       senna-docusate sodium  1 tablet Oral Daily PRN Jordan C Holter, DO      senna-docusate sodium  2 tablet Oral BID HOLLI Lion      traZODone  50 mg Oral HS PRN HOLLI Lion      white petrolatum-mineral oil   Topical TID PRN HOLLI Lion         Counseling / Coordination of Care: Total floor / unit time spent today 15 minutes. Greater than 50% of total time was spent with the patient and / or family counseling and / or somewhat receptive to supportive listening and teaching positive coping skills to deal with symptom mangement.     Patient's Rights, confidentiality and exceptions to confidentiality, use of automated medical record, Behavioral Health Services staff access to medical record, and consent to treatment reviewed.    This note has been dictated and hence there may be problems with punctuation, spelling and formatting and if anyone has any concerns please address them to Dr. Rosario   This note is not shared with patient due to potential for making patient's condition worse by knowing the content of the note.

## 2024-07-24 PROCEDURE — 99232 SBSQ HOSP IP/OBS MODERATE 35: CPT | Performed by: PSYCHIATRY & NEUROLOGY

## 2024-07-24 RX ADMIN — METFORMIN HYDROCHLORIDE 500 MG: 500 TABLET, FILM COATED ORAL at 08:56

## 2024-07-24 RX ADMIN — HYDROCHLOROTHIAZIDE 12.5 MG: 12.5 TABLET ORAL at 08:54

## 2024-07-24 RX ADMIN — FLUTICASONE PROPIONATE 1 SPRAY: 50 SPRAY, METERED NASAL at 08:56

## 2024-07-24 RX ADMIN — GLYCOPYRROLATE 1 MG: 1 TABLET ORAL at 21:21

## 2024-07-24 RX ADMIN — LORATADINE 10 MG: 10 TABLET ORAL at 08:55

## 2024-07-24 RX ADMIN — NICOTINE POLACRILEX 2 MG: 2 GUM, CHEWING ORAL at 09:10

## 2024-07-24 RX ADMIN — FAMOTIDINE 20 MG: 20 TABLET ORAL at 08:55

## 2024-07-24 RX ADMIN — NICOTINE POLACRILEX 2 MG: 2 GUM, CHEWING ORAL at 21:21

## 2024-07-24 RX ADMIN — ARIPIPRAZOLE 15 MG: 15 TABLET ORAL at 09:11

## 2024-07-24 RX ADMIN — POLYETHYLENE GLYCOL 3350 17 G: 17 POWDER, FOR SOLUTION ORAL at 08:56

## 2024-07-24 RX ADMIN — PROPRANOLOL HYDROCHLORIDE 10 MG: 10 TABLET ORAL at 08:55

## 2024-07-24 RX ADMIN — METFORMIN HYDROCHLORIDE 500 MG: 500 TABLET, FILM COATED ORAL at 17:10

## 2024-07-24 RX ADMIN — NICOTINE POLACRILEX 2 MG: 2 GUM, CHEWING ORAL at 14:11

## 2024-07-24 RX ADMIN — CLOZAPINE 500 MG: 100 TABLET ORAL at 21:20

## 2024-07-24 RX ADMIN — GLYCOPYRROLATE 1 MG: 1 TABLET ORAL at 08:54

## 2024-07-24 RX ADMIN — SENNOSIDES AND DOCUSATE SODIUM 2 TABLET: 8.6; 5 TABLET ORAL at 08:54

## 2024-07-24 RX ADMIN — PROPRANOLOL HYDROCHLORIDE 10 MG: 10 TABLET ORAL at 21:21

## 2024-07-24 RX ADMIN — GLYCOPYRROLATE 1 MG: 1 TABLET ORAL at 17:09

## 2024-07-24 RX ADMIN — FAMOTIDINE 20 MG: 20 TABLET ORAL at 17:09

## 2024-07-24 RX ADMIN — LIDOCAINE HYDROCHLORIDE 15 ML: 20 SOLUTION ORAL at 13:12

## 2024-07-24 RX ADMIN — SENNOSIDES AND DOCUSATE SODIUM 2 TABLET: 8.6; 5 TABLET ORAL at 17:09

## 2024-07-24 RX ADMIN — PANTOPRAZOLE SODIUM 20 MG: 20 TABLET, DELAYED RELEASE ORAL at 06:31

## 2024-07-24 RX ADMIN — ESCITALOPRAM OXALATE 20 MG: 10 TABLET, FILM COATED ORAL at 08:54

## 2024-07-24 RX ADMIN — CYANOCOBALAMIN TAB 1000 MCG 1000 MCG: 1000 TAB at 08:54

## 2024-07-24 NOTE — PROGRESS NOTES
07/24/24 0727   Team Meeting   Meeting Type Daily Rounds   Team Members Present   Team Members Present Physician;Nurse;;Other (Discipline and Name)   Patient/Family Present   Patient Present No   Patient's Family Present No     In attendance:  Dr. Alex Thomas, MD Dr. Jordan Holter, DO Eveline Hunt, HOLLI Taylor, RN  Luisana Alvarado, [facility]  Carla Lux, [facility]  Irais Mcdowell M.S.    Groups: 6/10    Pt's aunt is scheduled to visit Saturday around 10/10:30am. Pt reports ongoing AH; denies SI/HI. Pt has ECTs continuing every other week.

## 2024-07-24 NOTE — PLAN OF CARE
Problem: Ineffective Coping  Goal: Identifies ineffective coping skills  Outcome: Progressing  Goal: Identifies healthy coping skills  Outcome: Progressing  Goal: Demonstrates healthy coping skills  Outcome: Progressing  Goal: Participates in unit activities  Description: Interventions:  - Provide therapeutic environment   - Provide required programming   - Redirect inappropriate behaviors   Outcome: Progressing    Attended 12/18 of the groups offered in the past 2 days.

## 2024-07-24 NOTE — PROGRESS NOTES
Psychiatry Progress Note [facility]    Alberto Berumen 28 y.o. male MRN: [id number]  Unit/Bed#: -02 Encounter: [id number]  Code Status: Level 1 - Full Code    PCP: Joce Juan MD    Date of Admission:  3/29/2024 2008   Date of Service:  07/24/24    Patient Active Problem List   Diagnosis    GERD (gastroesophageal reflux disease)    Medical clearance for psychiatric admission    Schizoaffective disorder, bipolar type (HCC)    Tobacco abuse    T wave inversion in EKG    Syringoma    Chronic idiopathic constipation    Vitamin B 12 deficiency    Vitamin D deficiency    Confluent and reticulate papillomatosis    Class 2 obesity in adult    Primary hypertension    Elevated hemoglobin A1c    Bilateral lower extremity edema         Review of systems: He is using lidocaine swish in his mouth after allegedly biting his tongue during ECT on Friday otherwise unremarkable. He continues to get close to select peers needing reminders of boundaries. His appetite is starting to increase slightly but he is still feeling fatigued.   Psychiatric Diagnosis: Schizoaffective bipolar    Assessment  Overall Status: He reports the same threatening voices however states he has been ignoring them and they are starting to get a little louder after his ECT treatment on Friday. He is out of bed today and ate breakfast and is beginning to have a little more of an appetite.   Certification Statement: The patient will continue to require additional inpatient hospital stay due to ongoing threatening voices with lack of strong response from medications and ECT so far       Medications:   - Clozapine 500mg PO at bedtime  - Propranolol 10mg PO every 12 hours as needed for anxiety  - Abilify 15mg PO at bedtime  - Lexapro 20mg PO daily  - Atropine eyedrops sublingual for drooling of saliva  - Senna 2 tablets twice a day for constipation  Side effects from treatment: none  Medication changes   none   Medication education    Risks side effects benefits and precautions of medications discussed with patient and he did verbalize an understanding about risks for metabolic syndrome from being on neuroleptics and is form tardive dyskinesia etc.  All medications reviewed and I recommend that they be continued for symptom management   Understanding of medications: some understanding   Justification for dual anti-psychotics: n/a    Non-pharmacological treatments  Continue with individual, group, milieu and occupational therapy using recovery principles and psycho-education about accepting illness and the need for treatment.  Continue ECT q2 weekly for maintenance x 6, patient has 4 more treatments left.  Refused to see dietician. Agreeable to more exercises for obesity.  Plan to explore ACT team with discussion with aunt.  Risperdal was replaced with Abilify due to previously high prolactin level.    Safety  Safety and communication plan established to target dynamic risk factors discussed above.    Discharge Plan   Plan is to discharge back to his aunt with ACT team once ECT is completed    Interval Progress   He was out of bed and in the dining room today eating breakfast this morning. He states that he still feels pretty tired but is beginning to have more of an appetite today and ate breakfast for the first time in a few days. He continues to have to be reminded about boundaries with select peers and gets defensive when discussing needing to not get close to certain peers. He bit his tongue on Friday during ECT and has been using lidocaine swish which is effective. He is reporting the same threatening voices to kill him and his family but states they are more muffled after ECT treatments and not as loud. He has been compliant with medications. He needed no behavioral PRNs in the past 24 hours. He attended 6/10 groups yesterday. His aunt is coming to visit him on Saturday.   Acceptance by patient: accepting  Hopefulness in recovery: living  with aunt and sister  Involved in reintegration process: aunt, sister  Trusting in relationship with psychiatrist: appears to be trusting  Sleep: feeling very tired and sleeping more recently  Appetite: low recently but patient ate breakfast today and states appetite is slowly increasing  Compliance with Medications: compliant  Group attendance: 6/10 groups yesterday  Significant events: He had ECT on Friday. He states the voices are still present saying the same things and starting to become a little louder but he can ignore them. He continues to be pleasant and cooperative. He needs reminders about not getting close to select peers. He has 4 ECT treatments left.       Mental Status Exam  Appearance: marginal hygiene, wearing hospital clothes, overweight, dreadlocked hair, sitting in dining bautista eating breakfast  Behavior: cooperative, calm, slow responses  Speech: decreased rate, slow, scant, soft  Mood: depressed, dysphoric, anxious  Affect: normal range and intensity, appropriate  Thought Process: organized, goal directed, decreased rate of thoughts  Thought Content: paranoid ideation, intrusive thoughts, preoccupied, paranoia is due to voices he hears that are threatening to kill him and his family which make him feel depressed, he denies suicidal or homicidal thoughts, denies phobias, obsessions, compulsions, distorted body perceptions  Perceptual Disturbances: no visual hallucinations, does not appear responding to internal stimuli, auditory hallucinations of threatening voices that tell him they are going to kill him and his family, denies commanding voices, able to ignore them, chronic, voices are less and more manageable after ECT treatments  Hx Risk Factors: chronic psychiatric problems, chronic anxiety symptoms, chronic psychotic symptoms  Sensorium: alert  Cognition: recent and remote memory grossly intact  Consciousness: alert and awake  Attention: attention span and concentration are age  appropriate  Intellect: appears to be of average intelligence  Insight: intact  Judgement: intact  Motor Activity: no abnormal movements     Vitals  Temp:  [97.6 °F (36.4 °C)] 97.6 °F (36.4 °C)  HR:  [] 85  Resp:  [16-18] 16  BP: (117-145)/(73-93) 117/73  SpO2:  [95 %-98 %] 95 %  No intake or output data in the 24 hours ending 07/24/24 0825    Lab Results: All Labs Within Last 24 Hours Reviewed  Results from last 7 days   Lab Units 07/18/24  2113   WBC Thousand/uL 12.00*   RBC Million/uL 5.52   HEMOGLOBIN g/dL 13.0   HEMATOCRIT % 43.7   MCV fL 79*   PLATELETS Thousands/uL 265   TOTAL NEUT ABS Thousands/µL 6.46   CLOZAPINE LVL ng/mL 451       Current Facility-Administered Medications   Medication Dose Route Frequency Provider Last Rate    acetaminophen  650 mg Oral Q4H PRN Jordan C Holter,       acetaminophen  650 mg Oral Q6H PRN HOLLI Lion      aluminum-magnesium hydroxide-simethicone  30 mL Oral Q4H PRN Jordan C Holter,       amLODIPine  5 mg Oral Daily HOLLI Lion      ARIPiprazole  15 mg Oral Daily Bora Rosario MD      Artificial Tears  1 drop Both Eyes Q3H PRN Jordan C Holter,       atropine  1 drop Sublingual Daily PRN HOLLI Lion      haloperidol lactate  2.5 mg Intramuscular Q4H PRN Max 4/day HOLLI Lion      And    LORazepam  1 mg Intramuscular Q4H PRN Max 4/day HOLLI Lion      And    benztropine  0.5 mg Intramuscular Q4H PRN Max 4/day HOLLI Lion      benztropine  1 mg Intramuscular Q4H PRN Max 6/day Jordan C Holter,       haloperidol lactate  5 mg Intramuscular Q4H PRN Max 4/day HOLLI Lion      And    LORazepam  2 mg Intramuscular Q4H PRN Max 4/day HOLLI Lion      And    benztropine  1 mg Intramuscular Q4H PRN Max 4/day HOLLI Lion      benztropine  1 mg Oral Q4H PRN Max 6/day HOLLI Lion      benztropine  1 mg Oral Q4H PRN Max 6/day Jordan C Holter, DO      bisacodyl  10 mg Rectal Daily PRN HOLLI Lion       calcium carbonate  500 mg Oral BID PRN HOLLI Monge      cloZAPine  500 mg Oral HS Bora Rosario MD      cyanocobalamin  1,000 mcg Oral Daily HOLLI Galvan      hydrOXYzine HCL  50 mg Oral Q6H PRN Max 4/day Ion Dupontter, DO      Or    diphenhydrAMINE  50 mg Intramuscular Q6H PRN Ion Dupontter, DO      hydrOXYzine HCL  50 mg Oral Q6H PRN Max 4/day HOLLI Lion      Or    diphenhydrAMINE  50 mg Intramuscular Q6H PRN HOLLI Lion      diphenhydrAMINE-zinc acetate   Topical BID PRN HOLLI Lion      escitalopram  20 mg Oral Daily HOLLI Lion      famotidine  20 mg Oral BID HOLLI Monge      fluticasone  1 spray Each Nare Daily Brina Guillen MD      glycopyrrolate  1 mg Oral TID Bora Rosario MD      haloperidol  1 mg Oral Q6H PRN HOLLI Lion      haloperidol  2.5 mg Oral Q4H PRN Max 4/day HOLLI Lion      haloperidol  5 mg Oral Q4H PRN Max 4/day HOLLI Lion      hydroCHLOROthiazide  12.5 mg Oral Daily HOLLI Galvan      hydrocortisone   Topical 4x Daily PRN HOLLI Lion      hydrOXYzine HCL  100 mg Oral Q6H PRN Max 4/day Ion Dupontter, DO      Or    LORazepam  2 mg Intramuscular Q6H PRN Ion Dupontter, DO      hydrOXYzine HCL  100 mg Oral Q6H PRN Max 4/day HOLLI Lion      Or    LORazepam  2 mg Intramuscular Q6H PRN HOLLI Lion      hydrOXYzine HCL  25 mg Oral Q6H PRN Max 4/day Ion FISCHER Holter, DO      ibuprofen  600 mg Oral Q8H PRN HOLLI Lion      Lidocaine Viscous HCl  15 mL Swish & Spit 4x Daily PRN HOLLI Galvan      loratadine  10 mg Oral Daily Brina Guillen MD      melatonin  3 mg Oral HS PRN Bora Rosario MD      metFORMIN  500 mg Oral BID With Meals HOLLI Galvan      methocarbamol  500 mg Oral Q6H PRN HOLLI Lion      nicotine polacrilex  2 mg Oral Q4H PRN Bora Rosario MD      OLANZapine  5 mg Oral Q4H PRN Max 3/day Jordan C Holter, DO      Or     OLANZapine  2.5 mg Intramuscular Q4H PRN Max 3/day Ion C Holter, DO      OLANZapine  5 mg Oral Q3H PRN Max 3/day Ion C Holter, DO      Or    OLANZapine  5 mg Intramuscular Q3H PRN Max 3/day Ion C Holter, DO      OLANZapine  2.5 mg Oral Q4H PRN Max 6/day Ion C Holter, DO      ondansetron  4 mg Oral Q6H PRN HOLLI Lion      pantoprazole  20 mg Oral Early Morning Eileen Gonzalezmiryamjaylen, HOLLI      polyethylene glycol  17 g Oral Daily HOLLI Lion      polyethylene glycol  17 g Oral Daily PRN [facility] Holter, DO      propranolol  10 mg Oral Q12H KAYLIE HOLLI Lion      senna-docusate sodium  1 tablet Oral Daily PRN Ion C Cresencioter, DO      senna-docusate sodium  2 tablet Oral BID HOLLI Lion      traZODone  50 mg Oral HS PRN HOLLI Lion      white petrolatum-mineral oil   Topical TID PRN HOLLI Lion         Counseling / Coordination of Care: Total floor / unit time spent today 15 minutes. Greater than 50% of total time was spent with the patient and / or family counseling and / or somewhat receptive to supportive listening and teaching positive coping skills to deal with symptom mangement.     Patient's Rights, confidentiality and exceptions to confidentiality, use of automated medical record, Behavioral Health Services staff access to medical record, and consent to treatment reviewed.    This note has been dictated and hence there may be problems with punctuation, spelling and formatting and if anyone has any concerns please address them to Dr. Rosario   This note is not shared with patient due to potential for making patient's condition worse by knowing the content of the note.

## 2024-07-24 NOTE — NURSING NOTE
Alberto was visible this evening and there was not much change with him. He stated his AH continue and they are threatening in nature whereby they say they are going to harm and or kill him and or his family.  They are transient in that they are not all the time and they are unpredictable as to when they will be there or how intense they will be  He feels ECT is helping to lessen the intensity  He did not ask for any Swish and Swallow mouth treatment this evening

## 2024-07-24 NOTE — NURSING NOTE
Patient is calm and pleasant. Visible and social on unit. Attended groups. Denies SI/HI/AVH. Medication and meal compliant. Q 7 min safety checks ongoing.

## 2024-07-24 NOTE — SOCIAL WORK
"SW and pt met 1:1  Pt is more vocal and mobile this morning. Pt expressed feeling \"fine\" and denying any current concerns. Pt had just spoken with medical student and engaged appropriately. Pt remains flat and has hesitant responses.   SW encouraged continued group and unit participation and being focused on his recovery rather than peers.   "

## 2024-07-25 LAB
BASOPHILS # BLD AUTO: 0.1 THOUSANDS/ÂΜL (ref 0–0.1)
BASOPHILS NFR BLD AUTO: 1 % (ref 0–1)
BNP SERPL-MCNC: 13 PG/ML (ref 0–100)
CK SERPL-CCNC: 195 U/L (ref 39–308)
CLOZAPINE SERPL-MCNC: 430 NG/ML (ref 350–900)
CRP SERPL QL: 9.9 MG/L
EOSINOPHIL # BLD AUTO: 0.41 THOUSAND/ÂΜL (ref 0–0.61)
EOSINOPHIL NFR BLD AUTO: 3 % (ref 0–6)
ERYTHROCYTE [DISTWIDTH] IN BLOOD BY AUTOMATED COUNT: 13.8 % (ref 11.6–15.1)
HCT VFR BLD AUTO: 45.3 % (ref 36.5–49.3)
HGB BLD-MCNC: 13.6 G/DL (ref 12–17)
IMM GRANULOCYTES # BLD AUTO: 0.05 THOUSAND/UL (ref 0–0.2)
IMM GRANULOCYTES NFR BLD AUTO: 0 % (ref 0–2)
LYMPHOCYTES # BLD AUTO: 3.9 THOUSANDS/ÂΜL (ref 0.6–4.47)
LYMPHOCYTES NFR BLD AUTO: 31 % (ref 14–44)
MCH RBC QN AUTO: 23.8 PG (ref 26.8–34.3)
MCHC RBC AUTO-ENTMCNC: 30 G/DL (ref 31.4–37.4)
MCV RBC AUTO: 79 FL (ref 82–98)
MONOCYTES # BLD AUTO: 0.95 THOUSAND/ÂΜL (ref 0.17–1.22)
MONOCYTES NFR BLD AUTO: 8 % (ref 4–12)
NEUTROPHILS # BLD AUTO: 7.28 THOUSANDS/ÂΜL (ref 1.85–7.62)
NEUTS SEG NFR BLD AUTO: 57 % (ref 43–75)
NRBC BLD AUTO-RTO: 0 /100 WBCS
PLATELET # BLD AUTO: 303 THOUSANDS/UL (ref 149–390)
PMV BLD AUTO: 10.5 FL (ref 8.9–12.7)
RBC # BLD AUTO: 5.71 MILLION/UL (ref 3.88–5.62)
WBC # BLD AUTO: 12.69 THOUSAND/UL (ref 4.31–10.16)

## 2024-07-25 PROCEDURE — 85025 COMPLETE CBC W/AUTO DIFF WBC: CPT

## 2024-07-25 PROCEDURE — 80159 DRUG ASSAY CLOZAPINE: CPT | Performed by: PSYCHIATRY & NEUROLOGY

## 2024-07-25 PROCEDURE — 86140 C-REACTIVE PROTEIN: CPT

## 2024-07-25 PROCEDURE — 99232 SBSQ HOSP IP/OBS MODERATE 35: CPT | Performed by: PSYCHIATRY & NEUROLOGY

## 2024-07-25 PROCEDURE — 83880 ASSAY OF NATRIURETIC PEPTIDE: CPT

## 2024-07-25 PROCEDURE — 82550 ASSAY OF CK (CPK): CPT

## 2024-07-25 RX ADMIN — PANTOPRAZOLE SODIUM 20 MG: 20 TABLET, DELAYED RELEASE ORAL at 06:20

## 2024-07-25 RX ADMIN — NICOTINE POLACRILEX 2 MG: 2 GUM, CHEWING ORAL at 17:07

## 2024-07-25 RX ADMIN — SENNOSIDES AND DOCUSATE SODIUM 2 TABLET: 8.6; 5 TABLET ORAL at 17:04

## 2024-07-25 RX ADMIN — HYDROCHLOROTHIAZIDE 12.5 MG: 12.5 TABLET ORAL at 09:34

## 2024-07-25 RX ADMIN — PROPRANOLOL HYDROCHLORIDE 10 MG: 10 TABLET ORAL at 21:13

## 2024-07-25 RX ADMIN — FLUTICASONE PROPIONATE 1 SPRAY: 50 SPRAY, METERED NASAL at 09:36

## 2024-07-25 RX ADMIN — NICOTINE POLACRILEX 2 MG: 2 GUM, CHEWING ORAL at 09:34

## 2024-07-25 RX ADMIN — GLYCOPYRROLATE 1 MG: 1 TABLET ORAL at 09:34

## 2024-07-25 RX ADMIN — LORATADINE 10 MG: 10 TABLET ORAL at 09:34

## 2024-07-25 RX ADMIN — NICOTINE POLACRILEX 2 MG: 2 GUM, CHEWING ORAL at 21:13

## 2024-07-25 RX ADMIN — GLYCOPYRROLATE 1 MG: 1 TABLET ORAL at 21:13

## 2024-07-25 RX ADMIN — AMLODIPINE BESYLATE 5 MG: 5 TABLET ORAL at 09:34

## 2024-07-25 RX ADMIN — GLYCOPYRROLATE 1 MG: 1 TABLET ORAL at 17:04

## 2024-07-25 RX ADMIN — METFORMIN HYDROCHLORIDE 500 MG: 500 TABLET, FILM COATED ORAL at 09:34

## 2024-07-25 RX ADMIN — SENNOSIDES AND DOCUSATE SODIUM 2 TABLET: 8.6; 5 TABLET ORAL at 09:34

## 2024-07-25 RX ADMIN — METFORMIN HYDROCHLORIDE 500 MG: 500 TABLET, FILM COATED ORAL at 17:04

## 2024-07-25 RX ADMIN — POLYETHYLENE GLYCOL 3350 17 G: 17 POWDER, FOR SOLUTION ORAL at 09:36

## 2024-07-25 RX ADMIN — FAMOTIDINE 20 MG: 20 TABLET ORAL at 17:04

## 2024-07-25 RX ADMIN — CLOZAPINE 500 MG: 100 TABLET ORAL at 21:13

## 2024-07-25 RX ADMIN — CYANOCOBALAMIN TAB 1000 MCG 1000 MCG: 1000 TAB at 09:34

## 2024-07-25 RX ADMIN — ESCITALOPRAM OXALATE 20 MG: 10 TABLET, FILM COATED ORAL at 09:34

## 2024-07-25 RX ADMIN — PROPRANOLOL HYDROCHLORIDE 10 MG: 10 TABLET ORAL at 09:34

## 2024-07-25 RX ADMIN — FAMOTIDINE 20 MG: 20 TABLET ORAL at 09:34

## 2024-07-25 RX ADMIN — ARIPIPRAZOLE 15 MG: 15 TABLET ORAL at 09:34

## 2024-07-25 NOTE — NURSING NOTE
"Alberto was present in the milieu as always. Calm and polite and pleasant and no changes to his needs this evening. He continues to have contact with his two female \"friends\" and   "

## 2024-07-25 NOTE — PROGRESS NOTES
07/25/24 0745   Team Meeting   Meeting Type Daily Rounds   Team Members Present   Team Members Present Physician;Nurse;;Other (Discipline and Name)   Patient/Family Present   Patient Present No   Patient's Family Present No     In attendance:  Dr. Alex Thomas, MD Dr. Jordan Holter, HOLLI Weiss, RN  Luisana Alvarado, [facility]  Irais Mcdowell M.S.    Groups: 7/9    Pt generally flat; spends time pacing with female peer. Pt is social with select peers. In person visit with aunt Saturday.

## 2024-07-25 NOTE — NURSING NOTE
Received pt awake at start of shift 2245 walking around the unit with a female peer.  Pleasant, smiling.  Retired to bed by 2330 and continues to sleep thus this far as per q 7 min safety checks.     0549:  Sleeping 6+ hrs thus this far.

## 2024-07-25 NOTE — PLAN OF CARE
Problem: Alteration in Thoughts and Perception  Goal: Treatment Goal: Gain control of psychotic behaviors/thinking, reduce/eliminate presenting symptoms and demonstrate improved reality functioning upon discharge  Outcome: Progressing  Goal: Verbalize thoughts and feelings  Description: Interventions:  - Promote a nonjudgmental and trusting relationship with the patient through active listening and therapeutic communication  - Assess patient's level of functioning, behavior and potential for risk  - Engage patient in 1 on 1 interactions  - Encourage patient to express fears, feelings, frustrations, and discuss symptoms    - [place] patient to reality, help patient recognize reality-based thinking   - Administer medications as ordered and assess for potential side effects  - Provide the patient education related to the signs and symptoms of the illness and desired effects of prescribed medications  Outcome: Progressing  Goal: Refrain from acting on delusional thinking/internal stimuli  Description: Interventions:  - Monitor patient closely, per order   - Utilize least restrictive measures   - Set reasonable limits, give positive feedback for acceptable   - Administer medications as ordered and monitor of potential side effects  Outcome: Progressing  Goal: Agree to be compliant with medication regime, as prescribed and report medication side effects  Description: Interventions:  - Offer appropriate PRN medication and supervise ingestion; conduct AIMS, as needed   Outcome: Progressing  Goal: Attend and participate in unit activities, including therapeutic, recreational, and educational groups  Description: Interventions:  -Encourage Visitation and family involvement in care  Outcome: Progressing  Goal: Recognize dysfunctional thoughts, communicate reality-based thoughts at the time of discharge  Description: Interventions:  - Provide medication and psycho-education to assist patient in compliance and developing  insight into his/her illness   Outcome: Progressing  Goal: Complete daily ADLs, including personal hygiene independently, as able  Description: Interventions:  - Observe, teach, and assist patient with ADLS  - Monitor and promote a balance of rest/activity, with adequate nutrition and elimination   Outcome: Progressing     Problem: Ineffective Coping  Goal: Demonstrates healthy coping skills  Outcome: Progressing  Goal: Participates in unit activities  Description: Interventions:  - Provide therapeutic environment   - Provide required programming   - Redirect inappropriate behaviors   Outcome: Progressing     Problem: Depression  Goal: Treatment Goal: Demonstrate behavioral control of depressive symptoms, verbalize feelings of improved mood/affect, and adopt new coping skills prior to discharge  Outcome: Progressing  Goal: Verbalize thoughts and feelings  Description: Interventions:  - Assess and re-assess patient's level of risk   - Engage patient in 1:1 interactions, daily, for a minimum of 15 minutes   - Encourage patient to express feelings, fears, frustrations, hopes   Outcome: Progressing  Goal: Refrain from harming self  Description: Interventions:  - Monitor patient closely, per order   - Supervise medication ingestion, monitor effects and side effects   Outcome: Progressing  Goal: Refrain from isolation  Description: Interventions:  - Develop a trusting relationship   - Encourage socialization   Outcome: Progressing  Goal: Refrain from self-neglect  Outcome: Progressing  Goal: Complete daily ADLs, including personal hygiene independently, as able  Description: Interventions:  - Observe, teach, and assist patient with ADLS  -  Monitor and promote a balance of rest/activity, with adequate nutrition and elimination   Outcome: Progressing     Problem: Anxiety  Goal: Anxiety is at manageable level  Description: Interventions:  - Assess and monitor patient's anxiety level.   - Monitor for signs and symptoms  (heart palpitations, chest pain, shortness of breath, headaches, nausea, feeling jumpy, restlessness, irritable, apprehensive).   - Collaborate with interdisciplinary team and initiate plan and interventions as ordered.  - [place] patient to unit/surroundings  - Explain treatment plan  - Encourage participation in care  - Encourage verbalization of concerns/fears  - Identify coping mechanisms  - Assist in developing anxiety-reducing skills  - Administer/offer alternative therapies  - Limit or eliminate stimulants  Outcome: Progressing     Problem: Alteration in Orientation  Goal: Treatment Goal: Demonstrate a reduction of confusion and improved orientation to person, place, time and/or situation upon discharge, according to optimum baseline/ability  Outcome: Progressing  Goal: Interact with staff daily  Description: Interventions:  - Assess and re-assess patient's level of orientation  - Engage patient in 1 on 1 interactions, daily, for a minimum of 15 minutes   - Establish rapport/trust with patient   Outcome: Progressing  Goal: Attend and participate in unit activities, including therapeutic, recreational, and educational groups  Description: Interventions:  - Provide therapeutic and educational activities daily, encourage attendance and participation, and document same in the medical record   - Provide appropriate opportunities for reminiscence   - Provide a consistent daily routine   - Encourage family contact/visitation   Outcome: Progressing  Goal: Complete daily ADLs, including personal hygiene independently, as able  Description: Interventions:  - Observe, teach, and assist patient with ADLS  Outcome: Progressing

## 2024-07-25 NOTE — PROGRESS NOTES
Psychiatry Progress Note [facility]    Alberto Berumen 28 y.o. male MRN: [id number]  Unit/Bed#: -02 Encounter: [id number]  Code Status: Level 1 - Full Code    PCP: Joce Juan MD    Date of Admission:  3/29/2024 2008   Date of Service:  07/25/24    Patient Active Problem List   Diagnosis    GERD (gastroesophageal reflux disease)    Medical clearance for psychiatric admission    Schizoaffective disorder, bipolar type (HCC)    Tobacco abuse    T wave inversion in EKG    Syringoma    Chronic idiopathic constipation    Vitamin B 12 deficiency    Vitamin D deficiency    Confluent and reticulate papillomatosis    Class 2 obesity in adult    Primary hypertension    Elevated hemoglobin A1c    Bilateral lower extremity edema         Review of systems: He is using lidocaine swish in his mouth after allegedly biting his tongue during ECT on Friday otherwise unremarkable. He continues to be close to select peers but is redirectable. He is still feeling very tired.   Psychiatric Diagnosis: Schizoaffective bipolar    Assessment  Overall Status: He reports the same threatening voices however states he has been ignoring them. States they are starting to become a little louder after ECT treatment on Friday but manageable. He is sleeping in bed and did not go to breakfast this morning.   Certification Statement: The patient will continue to require additional inpatient hospital stay due to ongoing threatening voices with lack of strong responses from medications and ECT so far       Medications:   - Clozapine 500mg PO at bedtime  - Propranolol 10mg PO every 12 hours as needed for anxiety  - Abilify 15mg PO at bedtime  - Lexapro 20mg PO daily  - Atropine eyedrops sublingual for drooling of saliva  - Senna 2 tablets twice a day for constipation  Side effects from treatment: none  Medication changes   none   Medication education   Risks side effects benefits and precautions of medications discussed with  patient and he did verbalize an understanding about risks for metabolic syndrome from being on neuroleptics and is form tardive dyskinesia etc.  All medications reviewed and I recommend that they be continued for symptom management   Understanding of medications: some understanding   Justification for dual anti-psychotics: n/a    Non-pharmacological treatments  Continue with individual, group, milieu and occupational therapy using recovery principles and psycho-education about accepting illness and the need for treatment.  Continue ECT q2 weekly for maintenance x 6, patient has 4 more treatments left  Refused to see dietician. Agreeable to more exercises for obesity.  Plan to explore ACT team with discussion with aunt.  Risperdal was replaced with Abilify due to previously high prolactin level.    Safety  Safety and communication plan established to target dynamic risk factors discussed above.    Discharge Plan   Plan is to discharge back to his aunt with ACT team once ECT is completed    Interval Progress   He was found sleeping in bed covered in his blankets. States he is very tired and did not go to breakfast today. He is close to select peers but has been able to be redirected when getting too close. He bit his tongue on Friday during ECT and has been using lidocaine swish which is effective. He is reporting the same threatening voices to kill him and his family but states they are more muffled after ECT treatments. He has been compliant with medications. He needed no behavioral PRNs in the past 24 hours. He has been social and pleasant per nursing staff. He attended 7/9 groups yesterday.   Acceptance by patient: accepting  Hopefulness in recovery: living with aunt and sister  Involved in reintegration process: aunt, sister  Trusting in relationship with psychiatrist: appears to be more trusting  Sleep: sleeping a lot  Appetite: fair, skipped breakfast today  Compliance with Medications: compliant  Group  attendance: 7/9 groups yesterday  Significant events: He had ECT last Friday. States voices are still present saying the same things but he can ignore them. He has been social and pleasant. He has been redirectable when getting too close to select peers. He has 4 ECT treatments left.       Mental Status Exam  Appearance: marginal hygiene, wearing hospital clothes, overweight, dreadlocked hair, sleeping in bed with blankets over face  Behavior: cooperative, calm, slow responses  Speech: normal volume, decreased rate, slow  Mood: depressed, dysphoric, anxious  Affect: constricted, mood-congruent  Thought Process: organized, goal directed, decreased rate of thoughts  Thought Content: paranoid ideation, intrusive thoughts, preoccupied, paranoia is due to voices he hears that are threatening to kill him and his family which makes him feel depressed, he denies suicidal or homicidal thoughts, denies phobias, obsessions, compulsions, distorted body perceptions  Perceptual Disturbances: no visual hallucinations, does not appear responding to internal stimuli, auditory hallucinations of threatening voices that tell him they are going to kill him and his family, denies commanding voices, able to ignore them, chronic  Hx Risk Factors: chronic psychiatric problems, chronic anxiety symptoms, chronic psychotic symptoms  Sensorium: alert and drowsy  Cognition: recent and remote memory grossly intact  Consciousness: alert and sleepy  Attention: attention span and concentration are age appropriate  Intellect: appears to be of average intelligence  Insight: intact  Judgement: intact  Motor Activity: no abnormal movements     Vitals  Temp:  [97.7 °F (36.5 °C)-97.9 °F (36.6 °C)] 97.7 °F (36.5 °C)  HR:  [] 86  Resp:  [18-19] 19  BP: (128-158)/(82-96) 128/82  SpO2:  [97 %-98 %] 97 %  No intake or output data in the 24 hours ending 07/25/24 0808    Lab Results: All Labs Within Last 24 Hours Reviewed  Results from last 7 days   Lab  Units 07/18/24  2113   WBC Thousand/uL 12.00*   RBC Million/uL 5.52   HEMOGLOBIN g/dL 13.0   HEMATOCRIT % 43.7   MCV fL 79*   PLATELETS Thousands/uL 265   TOTAL NEUT ABS Thousands/µL 6.46   CLOZAPINE LVL ng/mL 451       Current Facility-Administered Medications   Medication Dose Route Frequency Provider Last Rate    acetaminophen  650 mg Oral Q4H PRN Jordan C Holter, DO      acetaminophen  650 mg Oral Q6H PRN HOLLI Lion      aluminum-magnesium hydroxide-simethicone  30 mL Oral Q4H PRN Jordan C Holter, DO      amLODIPine  5 mg Oral Daily HOLLI Lion      ARIPiprazole  15 mg Oral Daily Bora Rosario MD      Artificial Tears  1 drop Both Eyes Q3H PRN Jordan C Holter,       atropine  1 drop Sublingual Daily PRN HOLLI Lion      haloperidol lactate  2.5 mg Intramuscular Q4H PRN Max 4/day HOLLI Lion      And    LORazepam  1 mg Intramuscular Q4H PRN Max 4/day HOLLI Lion      And    benztropine  0.5 mg Intramuscular Q4H PRN Max 4/day HOLLI Lion      benztropine  1 mg Intramuscular Q4H PRN Max 6/day Jordan C Holter, DO      haloperidol lactate  5 mg Intramuscular Q4H PRN Max 4/day HOLLI Lion      And    LORazepam  2 mg Intramuscular Q4H PRN Max 4/day HOLLI Lion      And    benztropine  1 mg Intramuscular Q4H PRN Max 4/day HOLLI Lion      benztropine  1 mg Oral Q4H PRN Max 6/day HOLLI Lion      benztropine  1 mg Oral Q4H PRN Max 6/day Jordan C Holter, DO      bisacodyl  10 mg Rectal Daily PRN HOLLI Lion      calcium carbonate  500 mg Oral BID PRN HOLLI Monge      cloZAPine  500 mg Oral HS Bora Rosario MD      cyanocobalamin  1,000 mcg Oral Daily HOLLI Galvan      hydrOXYzine HCL  50 mg Oral Q6H PRN Max 4/day Jordan C Holter, DO      Or    diphenhydrAMINE  50 mg Intramuscular Q6H PRN Jordan C Holter,       hydrOXYzine HCL  50 mg Oral Q6H PRN Max 4/day HOLLI Lion      Or    diphenhydrAMINE  50 mg  Intramuscular Q6H PRN HOLLI Lion      diphenhydrAMINE-zinc acetate   Topical BID PRN EvelineHOLLI Christy      escitalopram  20 mg Oral Daily HOLLI Lion      famotidine  20 mg Oral BID Eileen GonzalezmiryamjaylenHOLLI      fluticasone  1 spray Each Nare Daily Brina Guillen MD      glycopyrrolate  1 mg Oral TID Bora Rosario MD      haloperidol  1 mg Oral Q6H PRN EvelineHOLLI Christy      haloperidol  2.5 mg Oral Q4H PRN Max 4/day HOLLI Lion      haloperidol  5 mg Oral Q4H PRN Max 4/day HOLLI Lion      hydroCHLOROthiazide  12.5 mg Oral Daily HOLLI Galvan      hydrocortisone   Topical 4x Daily PRN HOLLI Lion      hydrOXYzine HCL  100 mg Oral Q6H PRN Max 4/day Ion C Holter, DO      Or    LORazepam  2 mg Intramuscular Q6H PRN Ion C Holter, DO      hydrOXYzine HCL  100 mg Oral Q6H PRN Max 4/day HOLLI Lion      Or    LORazepam  2 mg Intramuscular Q6H PRN HOLLI Lion      hydrOXYzine HCL  25 mg Oral Q6H PRN Max 4/day Ion C Holter, DO      ibuprofen  600 mg Oral Q8H PRN HOLLI Lion      Lidocaine Viscous HCl  15 mL Swish & Spit 4x Daily PRN HOLLI Galvan      loratadine  10 mg Oral Daily Brina Guillen MD      melatonin  3 mg Oral HS PRN Bora Rosario MD      metFORMIN  500 mg Oral BID With Meals HOLLI Galvan      methocarbamol  500 mg Oral Q6H PRN HOLLI Lion      nicotine polacrilex  2 mg Oral Q4H PRN Bora Rosario MD      OLANZapine  5 mg Oral Q4H PRN Max 3/day Ion C Holter, DO      Or    OLANZapine  2.5 mg Intramuscular Q4H PRN Max 3/day Ion C Holter, DO      OLANZapine  5 mg Oral Q3H PRN Max 3/day Ion C Holter, DO      Or    OLANZapine  5 mg Intramuscular Q3H PRN Max 3/day Ion C Holter, DO      OLANZapine  2.5 mg Oral Q4H PRN Max 6/day Ion C Holter, DO      ondansetron  4 mg Oral Q6H PRN HOLLI Lion      pantoprazole  20 mg Oral Early Morning HOLLI Monge      polyethylene glycol   17 g Oral Daily HOLLI Lion      polyethylene glycol  17 g Oral Daily PRN Jordan C Holter, DO      propranolol  10 mg Oral Q12H KAYLIE HOLLI Lion      senna-docusate sodium  1 tablet Oral Daily PRN Jordan C Holter, DO      senna-docusate sodium  2 tablet Oral BID HOLLI Lion      traZODone  50 mg Oral HS PRN HOLLI Lion      white petrolatum-mineral oil   Topical TID PRN HOLLI Lion         Counseling / Coordination of Care: Total floor / unit time spent today 15 minutes. Greater than 50% of total time was spent with the patient and / or family counseling and / or somewhat receptive to supportive listening and teaching positive coping skills to deal with symptom mangement.     Patient's Rights, confidentiality and exceptions to confidentiality, use of automated medical record, Behavioral Health Services staff access to medical record, and consent to treatment reviewed.    This note has been dictated and hence there may be problems with punctuation, spelling and formatting and if anyone has any concerns please address them to Dr. Rosario   This note is not shared with patient due to potential for making patient's condition worse by knowing the content of the note.

## 2024-07-25 NOTE — NURSING NOTE
Pt is visible on the unit and social with select peers. Consumed 100% of dinner. Took medications without incidence. Pt is pleasant and cooperative. Attended 3/9 groups. Reports AHs and denies other psych symptoms. No behavioral issues. Pt offers no concerns or complaints. VSS. Continuous safety checks maintained.

## 2024-07-25 NOTE — NURSING NOTE
Pt is accepting of medications without incidence and meal compliant. Pt is social with select peers and visible in the milieu. Appropriate with all peer interactions and no issues noted. Pt reports AH and denies all other s/s. No new concerns.

## 2024-07-26 PROCEDURE — 99232 SBSQ HOSP IP/OBS MODERATE 35: CPT | Performed by: PSYCHIATRY & NEUROLOGY

## 2024-07-26 RX ADMIN — PROPRANOLOL HYDROCHLORIDE 10 MG: 10 TABLET ORAL at 08:38

## 2024-07-26 RX ADMIN — SENNOSIDES AND DOCUSATE SODIUM 2 TABLET: 8.6; 5 TABLET ORAL at 17:04

## 2024-07-26 RX ADMIN — POLYETHYLENE GLYCOL 3350 17 G: 17 POWDER, FOR SOLUTION ORAL at 08:49

## 2024-07-26 RX ADMIN — METFORMIN HYDROCHLORIDE 500 MG: 500 TABLET, FILM COATED ORAL at 17:04

## 2024-07-26 RX ADMIN — NICOTINE POLACRILEX 2 MG: 2 GUM, CHEWING ORAL at 08:56

## 2024-07-26 RX ADMIN — ARIPIPRAZOLE 15 MG: 15 TABLET ORAL at 08:38

## 2024-07-26 RX ADMIN — HYDROCHLOROTHIAZIDE 12.5 MG: 12.5 TABLET ORAL at 08:38

## 2024-07-26 RX ADMIN — ESCITALOPRAM OXALATE 20 MG: 10 TABLET, FILM COATED ORAL at 08:37

## 2024-07-26 RX ADMIN — PROPRANOLOL HYDROCHLORIDE 10 MG: 10 TABLET ORAL at 21:25

## 2024-07-26 RX ADMIN — NICOTINE POLACRILEX 2 MG: 2 GUM, CHEWING ORAL at 17:04

## 2024-07-26 RX ADMIN — LORATADINE 10 MG: 10 TABLET ORAL at 08:38

## 2024-07-26 RX ADMIN — FLUTICASONE PROPIONATE 1 SPRAY: 50 SPRAY, METERED NASAL at 08:49

## 2024-07-26 RX ADMIN — SENNOSIDES AND DOCUSATE SODIUM 2 TABLET: 8.6; 5 TABLET ORAL at 08:38

## 2024-07-26 RX ADMIN — LIDOCAINE HYDROCHLORIDE 15 ML: 20 SOLUTION ORAL at 12:42

## 2024-07-26 RX ADMIN — GLYCOPYRROLATE 1 MG: 1 TABLET ORAL at 17:04

## 2024-07-26 RX ADMIN — FAMOTIDINE 20 MG: 20 TABLET ORAL at 08:36

## 2024-07-26 RX ADMIN — AMLODIPINE BESYLATE 5 MG: 5 TABLET ORAL at 08:37

## 2024-07-26 RX ADMIN — PANTOPRAZOLE SODIUM 20 MG: 20 TABLET, DELAYED RELEASE ORAL at 06:02

## 2024-07-26 RX ADMIN — GLYCOPYRROLATE 1 MG: 1 TABLET ORAL at 08:37

## 2024-07-26 RX ADMIN — NICOTINE POLACRILEX 2 MG: 2 GUM, CHEWING ORAL at 21:25

## 2024-07-26 RX ADMIN — CYANOCOBALAMIN TAB 1000 MCG 1000 MCG: 1000 TAB at 08:37

## 2024-07-26 RX ADMIN — CLOZAPINE 500 MG: 100 TABLET ORAL at 21:25

## 2024-07-26 RX ADMIN — GLYCOPYRROLATE 1 MG: 1 TABLET ORAL at 21:25

## 2024-07-26 RX ADMIN — FAMOTIDINE 20 MG: 20 TABLET ORAL at 17:05

## 2024-07-26 RX ADMIN — METFORMIN HYDROCHLORIDE 500 MG: 500 TABLET, FILM COATED ORAL at 08:38

## 2024-07-26 RX ADMIN — NICOTINE POLACRILEX 2 MG: 2 GUM, CHEWING ORAL at 12:58

## 2024-07-26 NOTE — PLAN OF CARE
Problem: Alteration in Thoughts and Perception  Goal: Treatment Goal: Gain control of psychotic behaviors/thinking, reduce/eliminate presenting symptoms and demonstrate improved reality functioning upon discharge  Outcome: Progressing  Goal: Verbalize thoughts and feelings  Description: Interventions:  - Promote a nonjudgmental and trusting relationship with the patient through active listening and therapeutic communication  - Assess patient's level of functioning, behavior and potential for risk  - Engage patient in 1 on 1 interactions  - Encourage patient to express fears, feelings, frustrations, and discuss symptoms    - [place] patient to reality, help patient recognize reality-based thinking   - Administer medications as ordered and assess for potential side effects  - Provide the patient education related to the signs and symptoms of the illness and desired effects of prescribed medications  Outcome: Progressing  Goal: Refrain from acting on delusional thinking/internal stimuli  Description: Interventions:  - Monitor patient closely, per order   - Utilize least restrictive measures   - Set reasonable limits, give positive feedback for acceptable   - Administer medications as ordered and monitor of potential side effects  Outcome: Progressing  Goal: Agree to be compliant with medication regime, as prescribed and report medication side effects  Description: Interventions:  - Offer appropriate PRN medication and supervise ingestion; conduct AIMS, as needed   Outcome: Progressing  Goal: Attend and participate in unit activities, including therapeutic, recreational, and educational groups  Description: Interventions:  -Encourage Visitation and family involvement in care  Outcome: Progressing  Goal: Complete daily ADLs, including personal hygiene independently, as able  Description: Interventions:  - Observe, teach, and assist patient with ADLS  - Monitor and promote a balance of rest/activity, with adequate  nutrition and elimination   Outcome: Progressing     Problem: Ineffective Coping  Goal: Demonstrates healthy coping skills  Outcome: Progressing  Goal: Participates in unit activities  Description: Interventions:  - Provide therapeutic environment   - Provide required programming   - Redirect inappropriate behaviors   Outcome: Progressing  Goal: Patient/Family verbalizes awareness of resources  Outcome: Progressing     Problem: Depression  Goal: Treatment Goal: Demonstrate behavioral control of depressive symptoms, verbalize feelings of improved mood/affect, and adopt new coping skills prior to discharge  Outcome: Progressing  Goal: Verbalize thoughts and feelings  Description: Interventions:  - Assess and re-assess patient's level of risk   - Engage patient in 1:1 interactions, daily, for a minimum of 15 minutes   - Encourage patient to express feelings, fears, frustrations, hopes   Outcome: Progressing  Goal: Refrain from harming self  Description: Interventions:  - Monitor patient closely, per order   - Supervise medication ingestion, monitor effects and side effects   Outcome: Progressing  Goal: Refrain from isolation  Description: Interventions:  - Develop a trusting relationship   - Encourage socialization   Outcome: Progressing  Goal: Refrain from self-neglect  Outcome: Progressing  Goal: Attend and participate in unit activities, including therapeutic, recreational, and educational groups  Description: Interventions:  - Provide therapeutic and educational activities daily, encourage attendance and participation, and document same in the medical record   Outcome: Progressing  Goal: Complete daily ADLs, including personal hygiene independently, as able  Description: Interventions:  - Observe, teach, and assist patient with ADLS  -  Monitor and promote a balance of rest/activity, with adequate nutrition and elimination   Outcome: Progressing     Problem: Anxiety  Goal: Anxiety is at manageable  level  Description: Interventions:  - Assess and monitor patient's anxiety level.   - Monitor for signs and symptoms (heart palpitations, chest pain, shortness of breath, headaches, nausea, feeling jumpy, restlessness, irritable, apprehensive).   - Collaborate with interdisciplinary team and initiate plan and interventions as ordered.  - [place] patient to unit/surroundings  - Explain treatment plan  - Encourage participation in care  - Encourage verbalization of concerns/fears  - Identify coping mechanisms  - Assist in developing anxiety-reducing skills  - Administer/offer alternative therapies  - Limit or eliminate stimulants  Outcome: Progressing     Problem: Alteration in Orientation  Goal: Treatment Goal: Demonstrate a reduction of confusion and improved orientation to person, place, time and/or situation upon discharge, according to optimum baseline/ability  Outcome: Progressing  Goal: Interact with staff daily  Description: Interventions:  - Assess and re-assess patient's level of orientation  - Engage patient in 1 on 1 interactions, daily, for a minimum of 15 minutes   - Establish rapport/trust with patient   Outcome: Progressing  Goal: Attend and participate in unit activities, including therapeutic, recreational, and educational groups  Description: Interventions:  - Provide therapeutic and educational activities daily, encourage attendance and participation, and document same in the medical record   - Provide appropriate opportunities for reminiscence   - Provide a consistent daily routine   - Encourage family contact/visitation   Outcome: Progressing  Goal: Complete daily ADLs, including personal hygiene independently, as able  Description: Interventions:  - Observe, teach, and assist patient with ADLS  Outcome: Progressing     Problem: DISCHARGE PLANNING - CARE MANAGEMENT  Goal: Discharge to post-acute care or home with appropriate resources  Description: INTERVENTIONS:  - Conduct assessment to determine  patient/family and health care team treatment goals, and need for post-acute services based on payer coverage, community resources, and patient preferences, and barriers to discharge  - Address psychosocial, clinical, and financial barriers to discharge as identified in assessment in conjunction with the patient/family and health care team  - Arrange appropriate level of post-acute services according to patient’s   needs and preference and payer coverage in collaboration with the physician and health care team  - Communicate with and update the patient/family, physician, and health care team regarding progress on the discharge plan  - Arrange appropriate transportation to post-acute venues  Outcome: Progressing

## 2024-07-26 NOTE — NURSING NOTE
Pt is calm, cooperative and visible on unit. Pt reports AH and depression; denies SI/HI/VH. Compliant with medications and meals. Social with staff and peers. No behavioral issues. Will maintain q7 min checks.

## 2024-07-26 NOTE — PLAN OF CARE
Problem: Ineffective Coping  Goal: Identifies ineffective coping skills  Outcome: Not Progressing  Goal: Identifies healthy coping skills  Outcome: Not Progressing  Goal: Demonstrates healthy coping skills  Outcome: Not Progressing  Goal: Participates in unit activities  Description: Interventions:  - Provide therapeutic environment   - Provide required programming   - Redirect inappropriate behaviors   Outcome: Not Progressing    Attended 22/35 of the groups offered during the past 4 days.

## 2024-07-26 NOTE — NURSING NOTE
Received pt pacing around the unit with female peer at start of shift 2245.  Bright affect, pleasant and smiling.  Retired to bed at 2330 and continues to sleep thus this far as per q 7 min safety checks.      0542:  Sleeping 6+ hrs thus this far.  Q 7 min safety checks in progress.

## 2024-07-26 NOTE — PROGRESS NOTES
Psychiatry Progress Note [facility]    Alberto Berumen 28 y.o. male MRN: [id number]  Unit/Bed#: [facility] 108-02 Encounter: [id number]  Code Status: Level 1 - Full Code    PCP: Joce Juan MD    Date of Admission:  3/29/2024 2008   Date of Service:  07/26/24    Patient Active Problem List   Diagnosis    GERD (gastroesophageal reflux disease)    Medical clearance for psychiatric admission    Schizoaffective disorder, bipolar type (HCC)    Tobacco abuse    T wave inversion in EKG    Syringoma    Chronic idiopathic constipation    Vitamin B 12 deficiency    Vitamin D deficiency    Confluent and reticulate papillomatosis    Class 2 obesity in adult    Primary hypertension    Elevated hemoglobin A1c    Bilateral lower extremity edema         Review of systems: He is using lidocaine swish in his mouth after allegedly biting his tongue during ECT last Friday otherwise unremarkable. Improving with getting too close to peers. Still reporting fatigue.   Psychiatric Diagnosis: Schizoaffective bipolar    Assessment  Overall Status: He reports the same threatening voices but states he has been ignoring them. States they start to get a little louder leading up to next ECT treatment. He skipped breakfast again this morning due to feeling tired.   Certification Statement: The patient will continue to require additional inpatient hospital stay due to ongoing threatening voices with lack of strong response from medications and ECT so far       Medications:   - Clozapine 500mg PO at bedtime  - Propranolol 10mg PO every 12 hours as needed for anxiety  - Abilify 15mg PO at bedtime  - Lexapro 20mg PO daily  - Atropine eyedrops sublingual for drooling of saliva  - Senna 2 tablets twice a day for constipation  Side effects from treatment: none  Medication changes   none   Medication education   Risks side effects benefits and precautions of medications discussed with patient and he did verbalize an understanding about  risks for metabolic syndrome from being on neuroleptics and is form tardive dyskinesia etc.  All medications reviewed and I recommend that they be continued for symptom management   Understanding of medications: some   Justification for dual anti-psychotics: n/a    Non-pharmacological treatments  Continue with individual, group, milieu and occupational therapy using recovery principles and psycho-education about accepting illness and the need for treatment.  Continue ECT q2 weekly for maintenance x 6, patient has 4 more treatments left  Refused to see dietician, agreeable to more exercises for obesity  Plan to explore ACT team with discussion with aunt  Diane was replaced with Abilify due to previously high prolactin level    Safety  Safety and communication plan established to target dynamic risk factors discussed above.    Discharge Plan   Plan is to discharge back to his aunt with ACT team once ECT is completed    Interval Progress   He went to 3/9 groups yesterday. He has been improving with his behaviors with select peers and listening to staff to not get as close to peers. He has a visit scheduled with his aunt on Saturday. He is reporting the same threatening voices that tell him they are going to kill him and his family but states he can ignore them and they are always better after ECT treatments. He has been compliant with medications. He needed no behavioral PRNs in the past 24 hours. He has been social and pleasant per nursing staff.   Acceptance by patient: accepting  Hopefulness in recovery: living with aunt and sister  Involved in reintegration process: aunt, sister  Trusting in relationship with psychiatrist: appears to be more trusting  Sleep: good, sometimes sleeps too much  Appetite: fair, skipped breakfast again today but states he has been eating lunch and dinner  Compliance with Medications: compliant  Group attendance: 3/9 groups yesterday  Significant events: He states voices are still  present saying the same things but more manageable after ECT treatments. He has been social and pleasant. He has been improving with behaviors with select peers and not getting as close to them. He has 4 ECT treatments left.       Mental Status Exam  Appearance: marginal hygiene, wearing hospital clothes, overweight, dreadlocked hair  Behavior: cooperative, calm, slow responses  Speech: normal volume, decreased rate, slow  Mood: depressed, dysphoric, less anxious  Affect: constricted, mood-congruent  Thought Process: organized, goal directed, decreased rate of thoughts  Thought Content: paranoid ideation, intrusive thoughts, preoccupied, paranoia is due to voices he hears that are threatening to kill him and his family which makes him feel depressed, he denies suicidal or homicidal ideations, denies phobias, obsessions, compulsions, distorted body perceptions  Perceptual Disturbances: no visual hallucinations, does not appear responding to internal stimuli, auditory hallucinations of threatening voices that tell him they are going to kill him and his family, chronic, no commands  Hx Risk Factors: chronic psychiatric problems, chronic anxiety symptoms, chronic psychotic symptoms  Sensorium: alert  Cognition: recent and remote memory grossly intact  Consciousness: alert and awake  Attention: attention span and concentration are age appropriate  Intellect: appears to be of average intelligence  Insight: intact  Judgement: intact  Motor Activity: no abnormal movements     Vitals  Temp:  [97.9 °F (36.6 °C)] 97.9 °F (36.6 °C)  HR:  [101-102] 102  Resp:  [18] 18  BP: (133-138)/() 133/77  SpO2:  [95 %] 95 %  No intake or output data in the 24 hours ending 07/26/24 0823    Lab Results: All Labs Within Last 24 Hours Reviewed  Results from last 7 days   Lab Units 07/25/24 2043   WBC Thousand/uL 12.69*   RBC Million/uL 5.71*   HEMOGLOBIN g/dL 13.6   HEMATOCRIT % 45.3   MCV fL 79*   PLATELETS Thousands/uL 303   TOTAL NEUT  ABS Thousands/µL 7.28   CLOZAPINE LVL ng/mL 430       Current Facility-Administered Medications   Medication Dose Route Frequency Provider Last Rate    acetaminophen  650 mg Oral Q4H PRN Jordan C Holter, DO      acetaminophen  650 mg Oral Q6H PRN HOLLI Lion      aluminum-magnesium hydroxide-simethicone  30 mL Oral Q4H PRN Jordan C Holter, DO      amLODIPine  5 mg Oral Daily HOLLI Lion      ARIPiprazole  15 mg Oral Daily Bora Rosario MD      Artificial Tears  1 drop Both Eyes Q3H PRN Jordan C Holter, DO      atropine  1 drop Sublingual Daily PRN HOLLI Lion      haloperidol lactate  2.5 mg Intramuscular Q4H PRN Max 4/day HOLLI Lion      And    LORazepam  1 mg Intramuscular Q4H PRN Max 4/day HOLLI Lion      And    benztropine  0.5 mg Intramuscular Q4H PRN Max 4/day HOLLI Lion      benztropine  1 mg Intramuscular Q4H PRN Max 6/day Jordan C Holter, DO      haloperidol lactate  5 mg Intramuscular Q4H PRN Max 4/day HOLLI Lion      And    LORazepam  2 mg Intramuscular Q4H PRN Max 4/day HOLLI Lion      And    benztropine  1 mg Intramuscular Q4H PRN Max 4/day HOLLI Lion      benztropine  1 mg Oral Q4H PRN Max 6/day HOLLI Lion      benztropine  1 mg Oral Q4H PRN Max 6/day Jordan C Holter, DO      bisacodyl  10 mg Rectal Daily PRN HOLLI Lion      calcium carbonate  500 mg Oral BID PRN HOLLI Monge      cloZAPine  500 mg Oral HS Bora Rosario MD      cyanocobalamin  1,000 mcg Oral Daily HOLLI Galvan      hydrOXYzine HCL  50 mg Oral Q6H PRN Max 4/day Jordan C Holter, DO      Or    diphenhydrAMINE  50 mg Intramuscular Q6H PRN Jordan C Holter,       hydrOXYzine HCL  50 mg Oral Q6H PRN Max 4/day HOLLI Lion      Or    diphenhydrAMINE  50 mg Intramuscular Q6H PRN HOLLI Lion      diphenhydrAMINE-zinc acetate   Topical BID PRN HOLLI Lion      escitalopram  20 mg Oral Daily HOLLI Lion       famotidine  20 mg Oral BID HOLLI Monge      fluticasone  1 spray Each Nare Daily Brina Guillen MD      glycopyrrolate  1 mg Oral TID Bora Rosario MD      haloperidol  1 mg Oral Q6H PRN HOLLI Lion      haloperidol  2.5 mg Oral Q4H PRN Max 4/day HOLLI Lion      haloperidol  5 mg Oral Q4H PRN Max 4/day HOLLI Lion      hydroCHLOROthiazide  12.5 mg Oral Daily HOLLI Galvan      hydrocortisone   Topical 4x Daily PRN HOLLI Lion      hydrOXYzine HCL  100 mg Oral Q6H PRN Max 4/day [facility] Holter, DO      Or    LORazepam  2 mg Intramuscular Q6H PRN [facility] Holter, DO      hydrOXYzine HCL  100 mg Oral Q6H PRN Max 4/day HOLLI Lion      Or    LORazepam  2 mg Intramuscular Q6H PRN HOLLI Lion      hydrOXYzine HCL  25 mg Oral Q6H PRN Max 4/day [facility] Holter, DO      ibuprofen  600 mg Oral Q8H PRN HOLLI Lion      Lidocaine Viscous HCl  15 mL Swish & Spit 4x Daily PRN HOLLI Galvan      loratadine  10 mg Oral Daily Brina Guillen MD      melatonin  3 mg Oral HS PRN Bora Rosario MD      metFORMIN  500 mg Oral BID With Meals HOLLI Galvan      methocarbamol  500 mg Oral Q6H PRN HOLLI Lion      nicotine polacrilex  2 mg Oral Q4H PRN Bora Rosario MD      OLANZapine  5 mg Oral Q4H PRN Max 3/day Ion  Holter, DO      Or    OLANZapine  2.5 mg Intramuscular Q4H PRN Max 3/day [facility] Holter, DO      OLANZapine  5 mg Oral Q3H PRN Max 3/day [facility] Holter, DO      Or    OLANZapine  5 mg Intramuscular Q3H PRN Max 3/day [facility] Holter, DO      OLANZapine  2.5 mg Oral Q4H PRN Max 6/day Ion  Holter, DO      ondansetron  4 mg Oral Q6H PRN HOLLI Lion      pantoprazole  20 mg Oral Early Morning HOLLI Monge      polyethylene glycol  17 g Oral Daily HOLLI Lion      polyethylene glycol  17 g Oral Daily PRN Jordan C Holter, DO      propranolol  10 mg Oral Q12H [facility] HOLLI Lion       senna-docusate sodium  1 tablet Oral Daily PRN Jordan C Holter, DO      senna-docusate sodium  2 tablet Oral BID HOLLI Lion      traZODone  50 mg Oral HS PRN HOLLI Lion      white petrolatum-mineral oil   Topical TID PRN HOLLI Lion         Counseling / Coordination of Care: Total floor / unit time spent today 15 minutes. Greater than 50% of total time was spent with the patient and / or family counseling and / or somewhat receptive to supportive listening and teaching positive coping skills to deal with symptom mangement.     Patient's Rights, confidentiality and exceptions to confidentiality, use of automated medical record, Behavioral Health Services staff access to medical record, and consent to treatment reviewed.    This note has been dictated and hence there may be problems with punctuation, spelling and formatting and if anyone has any concerns please address them to Dr. Rosario   This note is not shared with patient due to potential for making patient's condition worse by knowing the content of the note.

## 2024-07-27 PROCEDURE — 99232 SBSQ HOSP IP/OBS MODERATE 35: CPT

## 2024-07-27 RX ADMIN — ARIPIPRAZOLE 15 MG: 15 TABLET ORAL at 08:59

## 2024-07-27 RX ADMIN — NICOTINE POLACRILEX 2 MG: 2 GUM, CHEWING ORAL at 14:16

## 2024-07-27 RX ADMIN — PANTOPRAZOLE SODIUM 20 MG: 20 TABLET, DELAYED RELEASE ORAL at 06:07

## 2024-07-27 RX ADMIN — NICOTINE POLACRILEX 2 MG: 2 GUM, CHEWING ORAL at 22:00

## 2024-07-27 RX ADMIN — NICOTINE POLACRILEX 2 MG: 2 GUM, CHEWING ORAL at 17:55

## 2024-07-27 RX ADMIN — METFORMIN HYDROCHLORIDE 500 MG: 500 TABLET, FILM COATED ORAL at 08:59

## 2024-07-27 RX ADMIN — GLYCOPYRROLATE 1 MG: 1 TABLET ORAL at 21:39

## 2024-07-27 RX ADMIN — SENNOSIDES AND DOCUSATE SODIUM 2 TABLET: 8.6; 5 TABLET ORAL at 17:09

## 2024-07-27 RX ADMIN — PROPRANOLOL HYDROCHLORIDE 10 MG: 10 TABLET ORAL at 21:39

## 2024-07-27 RX ADMIN — NICOTINE POLACRILEX 2 MG: 2 GUM, CHEWING ORAL at 08:59

## 2024-07-27 RX ADMIN — GLYCOPYRROLATE 1 MG: 1 TABLET ORAL at 08:59

## 2024-07-27 RX ADMIN — GLYCOPYRROLATE 1 MG: 1 TABLET ORAL at 17:08

## 2024-07-27 RX ADMIN — ESCITALOPRAM OXALATE 20 MG: 10 TABLET, FILM COATED ORAL at 08:59

## 2024-07-27 RX ADMIN — AMLODIPINE BESYLATE 5 MG: 5 TABLET ORAL at 08:59

## 2024-07-27 RX ADMIN — FLUTICASONE PROPIONATE 1 SPRAY: 50 SPRAY, METERED NASAL at 09:00

## 2024-07-27 RX ADMIN — PROPRANOLOL HYDROCHLORIDE 10 MG: 10 TABLET ORAL at 08:59

## 2024-07-27 RX ADMIN — CLOZAPINE 500 MG: 100 TABLET ORAL at 21:38

## 2024-07-27 RX ADMIN — LIDOCAINE HYDROCHLORIDE 15 ML: 20 SOLUTION ORAL at 17:56

## 2024-07-27 RX ADMIN — FAMOTIDINE 20 MG: 20 TABLET ORAL at 17:09

## 2024-07-27 RX ADMIN — SENNOSIDES AND DOCUSATE SODIUM 2 TABLET: 8.6; 5 TABLET ORAL at 08:59

## 2024-07-27 RX ADMIN — HYDROCHLOROTHIAZIDE 12.5 MG: 12.5 TABLET ORAL at 08:59

## 2024-07-27 RX ADMIN — CYANOCOBALAMIN TAB 1000 MCG 1000 MCG: 1000 TAB at 08:59

## 2024-07-27 RX ADMIN — LORATADINE 10 MG: 10 TABLET ORAL at 08:59

## 2024-07-27 RX ADMIN — METFORMIN HYDROCHLORIDE 500 MG: 500 TABLET, FILM COATED ORAL at 17:08

## 2024-07-27 RX ADMIN — FAMOTIDINE 20 MG: 20 TABLET ORAL at 08:59

## 2024-07-27 NOTE — PLAN OF CARE
Problem: Alteration in Thoughts and Perception  Goal: Verbalize thoughts and feelings  Description: Interventions:  - Promote a nonjudgmental and trusting relationship with the patient through active listening and therapeutic communication  - Assess patient's level of functioning, behavior and potential for risk  - Engage patient in 1 on 1 interactions  - Encourage patient to express fears, feelings, frustrations, and discuss symptoms    - [place] patient to reality, help patient recognize reality-based thinking   - Administer medications as ordered and assess for potential side effects  - Provide the patient education related to the signs and symptoms of the illness and desired effects of prescribed medications  Outcome: Not Progressing  Goal: Refrain from acting on delusional thinking/internal stimuli  Description: Interventions:  - Monitor patient closely, per order   - Utilize least restrictive measures   - Set reasonable limits, give positive feedback for acceptable   - Administer medications as ordered and monitor of potential side effects  Outcome: Not Progressing  Goal: Agree to be compliant with medication regime, as prescribed and report medication side effects  Description: Interventions:  - Offer appropriate PRN medication and supervise ingestion; conduct AIMS, as needed   Outcome: Progressing  Goal: Attend and participate in unit activities, including therapeutic, recreational, and educational groups  Description: Interventions:  -Encourage Visitation and family involvement in care  Outcome: Not Progressing  Goal: Recognize dysfunctional thoughts, communicate reality-based thoughts at the time of discharge  Description: Interventions:  - Provide medication and psycho-education to assist patient in compliance and developing insight into his/her illness   Outcome: Not Progressing  Goal: Complete daily ADLs, including personal hygiene independently, as able  Description: Interventions:  - Observe, teach,  and assist patient with ADLS  - Monitor and promote a balance of rest/activity, with adequate nutrition and elimination   Outcome: Not Progressing     Problem: Ineffective Coping  Goal: Identifies ineffective coping skills  Outcome: Not Progressing  Goal: Identifies healthy coping skills  Outcome: Not Progressing  Goal: Demonstrates healthy coping skills  Outcome: Not Progressing  Goal: Participates in unit activities  Description: Interventions:  - Provide therapeutic environment   - Provide required programming   - Redirect inappropriate behaviors   Outcome: Not Progressing  Goal: Patient/Family participate in treatment and DC plans  Description: Interventions:  - Provide therapeutic environment  Outcome: Not Progressing  Goal: Patient/Family verbalizes awareness of resources  Outcome: Not Progressing     Problem: Depression  Goal: Verbalize thoughts and feelings  Description: Interventions:  - Assess and re-assess patient's level of risk   - Engage patient in 1:1 interactions, daily, for a minimum of 15 minutes   - Encourage patient to express feelings, fears, frustrations, hopes   Outcome: Not Progressing  Goal: Refrain from harming self  Description: Interventions:  - Monitor patient closely, per order   - Supervise medication ingestion, monitor effects and side effects   Outcome: Progressing  Goal: Refrain from isolation  Description: Interventions:  - Develop a trusting relationship   - Encourage socialization   Outcome: Progressing  Goal: Attend and participate in unit activities, including therapeutic, recreational, and educational groups  Description: Interventions:  - Provide therapeutic and educational activities daily, encourage attendance and participation, and document same in the medical record   Outcome: Not Progressing  Goal: Complete daily ADLs, including personal hygiene independently, as able  Description: Interventions:  - Observe, teach, and assist patient with ADLS  -  Monitor and promote a  balance of rest/activity, with adequate nutrition and elimination   Outcome: Not Progressing     Problem: Anxiety  Goal: Anxiety is at manageable level  Description: Interventions:  - Assess and monitor patient's anxiety level.   - Monitor for signs and symptoms (heart palpitations, chest pain, shortness of breath, headaches, nausea, feeling jumpy, restlessness, irritable, apprehensive).   - Collaborate with interdisciplinary team and initiate plan and interventions as ordered.  - [place] patient to unit/surroundings  - Explain treatment plan  - Encourage participation in care  - Encourage verbalization of concerns/fears  - Identify coping mechanisms  - Assist in developing anxiety-reducing skills  - Administer/offer alternative therapies  - Limit or eliminate stimulants  Outcome: Not Progressing     Problem: Alteration in Orientation  Goal: Interact with staff daily  Description: Interventions:  - Assess and re-assess patient's level of orientation  - Engage patient in 1 on 1 interactions, daily, for a minimum of 15 minutes   - Establish rapport/trust with patient   Outcome: Progressing  Goal: Express concerns related to confused thinking related to:  Description: Interventions:  - Encourage patient to express feelings, fears, frustrations, hopes  - Assign consistent caregivers   - [place]/re-orient patient as needed  - Allow comfort items, as appropriate  - Provide visual cues, signs, etc.   Outcome: Not Progressing  Goal: Allow medical examinations, as recommended  Description: Interventions:  - Provide physical/neurological exams and/or referrals, per provider   Outcome: Progressing  Goal: Cooperate with recommended testing/procedures  Description: Interventions:  - Determine need for ancillary testing  - Observe for mental status changes  - Implement falls/precaution protocol   Outcome: Progressing  Goal: Attend and participate in unit activities, including therapeutic, recreational, and educational  groups  Description: Interventions:  - Provide therapeutic and educational activities daily, encourage attendance and participation, and document same in the medical record   - Provide appropriate opportunities for reminiscence   - Provide a consistent daily routine   - Encourage family contact/visitation   Outcome: Not Progressing  Goal: Complete daily ADLs, including personal hygiene independently, as able  Description: Interventions:  - Observe, teach, and assist patient with ADLS  Outcome: Not Progressing     Problem: Electroconvulsive therapy (ECT)  Goal: Verbalizes/displays adequate comfort level or baseline comfort level  Description: Interventions:  - Encourage patient to monitor pain and request assistance  - Assess pain using appropriate pain scale  - Administer analgesics based on type and severity of pain and evaluate response  - Implement non-pharmacological measures as appropriate and evaluate response  - Consider cultural and social influences on pain and pain management  - Notify physician/advanced practitioner if interventions unsuccessful or patient reports new pain  Outcome: Not Progressing  Goal: Achieves stable or improved neurological status  Description: INTERVENTIONS  - Monitor and report changes in neurological status  - Monitor vital signs such as temperature, blood pressure, glucose, and any other labs ordered   - Initiate measures to prevent increased intracranial pressure  - Monitor for seizure activity and implement precautions if appropriate      Outcome: Progressing  Goal: Achieves maximal functionality and self care  Description: INTERVENTIONS  - Monitor swallowing and airway patency with patient fatigue and changes in neurological status  - Encourage and assist patient to increase activity and self care.   - Encourage visually impaired, hearing impaired and aphasic patients to use assistive/communication devices  Outcome: Not Progressing  Goal: Maintain or return mobility to safest  level of function  Description: INTERVENTIONS:  - Assess patient's ability to carry out ADLs; assess patient's baseline for ADL function and identify physical deficits which impact ability to perform ADLs (bathing, care of mouth/teeth, toileting, grooming, dressing, etc.)  - Assess/evaluate cause of self-care deficits   - Assess range of motion  - Assess patient's mobility  - Assess patient's need for assistive devices and provide as appropriate  - Encourage maximum independence but intervene and supervise when necessary  - Involve family in performance of ADLs  - Assess for home care needs following discharge   - Consider OT consult to assist with ADL evaluation and planning for discharge  - Provide patient education as appropriate  Outcome: Not Progressing  Goal: Absence of urinary retention  Description: INTERVENTIONS:  - Assess patient’s ability to void and empty bladder  - Monitor I/O  - Bladder scan as needed  - Discuss with physician/AP medications to alleviate retention as needed  - Discuss catheterization for long term situations as appropriate  Outcome: Progressing  Goal: Minimal or absence of nausea and/or vomiting  Description: INTERVENTIONS:  - Administer IV fluids if ordered to ensure adequate hydration  - Maintain NPO status until nausea and vomiting are resolved  - Nasogastric tube if ordered  - Administer ordered antiemetic medications as needed  - Provide nonpharmacologic comfort measures as appropriate  - Advance diet as tolerated, if ordered  - Consider nutrition services referral to assist patient with adequate nutrition and appropriate food choices  Outcome: Progressing  Goal: Maintains adequate nutritional intake  Description: INTERVENTIONS:  - Monitor percentage of each meal consumed  - Identify factors contributing to decreased intake, treat as appropriate  - Assist with meals as needed  - Monitor I&O, weight, and lab values if indicated  - Obtain nutrition services referral as  needed  Outcome: Progressing     Problem: DISCHARGE PLANNING - CARE MANAGEMENT  Goal: Discharge to post-acute care or home with appropriate resources  Description: INTERVENTIONS:  - Conduct assessment to determine patient/family and health care team treatment goals, and need for post-acute services based on payer coverage, community resources, and patient preferences, and barriers to discharge  - Address psychosocial, clinical, and financial barriers to discharge as identified in assessment in conjunction with the patient/family and health care team  - Arrange appropriate level of post-acute services according to patient’s   needs and preference and payer coverage in collaboration with the physician and health care team  - Communicate with and update the patient/family, physician, and health care team regarding progress on the discharge plan  - Arrange appropriate transportation to post-acute venues  Outcome: Not Progressing

## 2024-07-27 NOTE — NURSING NOTE
Alberto reported feeling depressed today, but denied feeling anxious.  Pt still reports hearing voices telling him that they are going to kill him and his family.    Pt is visible with select peers.  Pleasant and cooperative.  No inappropriate behavior displayed.       Meal and med compliant, consumed 100% of dinner.  Requested gum throughout the shift.  No unmet needs.  No behavioral issues noted or reported.

## 2024-07-27 NOTE — NURSING NOTE
Patient had successful visit w/ Aunt Hanh. Two additional aunts also came to visit and took turns switching out. Patient was bright during and after visitation.

## 2024-07-27 NOTE — PROGRESS NOTES
"Progress Note - Behavioral Health   Alberto Berumen 28 y.o. male MRN: [id number]  Unit/Bed#: [facility] 108-02 Encounter: [id number]      Subjective:     Documentation, nursing notes, medication reconciliation, labs, and vitals reviewed. Patient was seen for continuing care and reviewed with treatment team.  No acute events over the past 24 hours. Per nursing report, that today he has a visit with his aunt planned.  He remains depressed with AH, with paranoid thoughts with fear himself or someone in his family will be murdered.  He has been eating well. PRN medications administered: Lidocaine mucosal soln on 7/26 at 12:42 for mouth pain, and intermitted nicotine gum use.  No medication changes over the past 24 hours.     On evaluation today, patient is observed in dining room socializing with select peers.  He then retreats to his room to lay down to rest.  He reports he has been tired.  He reports ongoing AH of \"hearing voices\" and does not provide further elaboration.  He reports ongoing depression and rates depression 5.5 on a 0-10 scale with 10 being most severe.  He denies passive or active SI/HI with plan or intent.  He denies panic symptoms.  He appears with mild irritable undertone. He declines to attend group.  He is mildly disheveled.  He does not offer any further concerns at this time.    Continues to tolerate current medications with no adverse effects.     No self-harming/suicidal ideation, plan, or intent upon direct inquiry. No thoughts to harm others.  No agitation or aggression noted. Does not appear overtly manic. Offers no further complaints.       Psychiatric ROS:  Behavior over the last 24 hours: unchanged  Sleep: normal  Appetite: decreased  Medication side effects: No   ROS: no complaints, all other systems are negative      Mental Status Evaluation:    Appearance:  casually dressed, marginal hygiene   Behavior:  cooperative, calm, somewhat guarded   Speech:  scant, soft   Mood:  depressed "   Affect:  constricted   Thought Process:  coherent, goal directed   Associations: intact associations   Thought Content:  some paranoia   Perceptual Disturbances: auditory hallucinations of voices, no visual hallucinated   Risk Potential: Suicidal ideation - None at present  Homicidal ideation - None at present  Potential for aggression - No   Sensorium:  oriented to person, place, and time/date   Memory:  recent and remote memory grossly intact   Consciousness:  alert and awake   Attention/Concentration: attention span and concentration are age appropriate   Insight:  fair   Judgment: fair   Gait/Station: normal gait/station   Motor Activity: no abnormal movements       Vital signs in last 24 hours:    Temp:  [97.4 °F (36.3 °C)-97.9 °F (36.6 °C)] 97.4 °F (36.3 °C)  HR:  [] 98  Resp:  [18] 18  BP: (140-143)/(80-95) 141/82    Laboratory results: I have personally reviewed all pertinent laboratory/tests results    Results from the past 24 hours: No results found for this or any previous visit (from the past 24 hour(s)).      Progress Toward Goals: no significant improvement today    Suicide/Homicide Risk Assessment:    Risk of Harm to Self:   Nursing Suicide Risk Assessment Last 24 hours: C-SSRS Risk (Since Last Contact)  Calculated C-SSRS Risk Score (Since Last Contact): No Risk Indicated    Risk of Harm to Others:  Nursing Homicide Risk Assessment: Violence Risk to Others: Denies within past 6 months    Assessment & Plan   Principal Problem:    Schizoaffective disorder, bipolar type (HCC)  Active Problems:    GERD (gastroesophageal reflux disease)    Medical clearance for psychiatric admission    Tobacco abuse    T wave inversion in EKG    Chronic idiopathic constipation    Confluent and reticulate papillomatosis    Primary hypertension    Elevated hemoglobin A1c    Bilateral lower extremity edema      Recommended Treatment:     Planned medication and treatment changes:    All current active medications have  been reviewed  Encourage group therapy, milieu therapy and occupational therapy  Behavioral Health checks every 7 minutes  Continue with SLIM medical management as indicated  Disposition planning ongoing    Continue current medications:    Current Facility-Administered Medications   Medication Dose Route Frequency Provider Last Rate    acetaminophen  650 mg Oral Q4H PRN Jordan C Holter, DO      acetaminophen  650 mg Oral Q6H PRN HOLLI Lion      aluminum-magnesium hydroxide-simethicone  30 mL Oral Q4H PRN Jordan C Holter, DO      amLODIPine  5 mg Oral Daily HOLLI Lion      ARIPiprazole  15 mg Oral Daily Bora Rosario MD      Artificial Tears  1 drop Both Eyes Q3H PRN Jordan C Holter, DO      atropine  1 drop Sublingual Daily PRN HOLLI iLon      haloperidol lactate  2.5 mg Intramuscular Q4H PRN Max 4/day HOLLI Lion      And    LORazepam  1 mg Intramuscular Q4H PRN Max 4/day HOLLI Lion      And    benztropine  0.5 mg Intramuscular Q4H PRN Max 4/day HOLLI Lion      benztropine  1 mg Intramuscular Q4H PRN Max 6/day Jordan C Holter, DO      haloperidol lactate  5 mg Intramuscular Q4H PRN Max 4/day HOLLI Lion      And    LORazepam  2 mg Intramuscular Q4H PRN Max 4/day HOLLI Lion      And    benztropine  1 mg Intramuscular Q4H PRN Max 4/day HOLLI Lion      benztropine  1 mg Oral Q4H PRN Max 6/day HOLLI Lion      benztropine  1 mg Oral Q4H PRN Max 6/day Jordan C Holter, DO      bisacodyl  10 mg Rectal Daily PRN HOLLI Lion      calcium carbonate  500 mg Oral BID PRN HOLLI Monge      cloZAPine  500 mg Oral HS Bora Rosario MD      cyanocobalamin  1,000 mcg Oral Daily HOLLI Galvan      hydrOXYzine HCL  50 mg Oral Q6H PRN Max 4/day Jordan C Holter, DO      Or    diphenhydrAMINE  50 mg Intramuscular Q6H PRN Jordan C Holter, DO      hydrOXYzine HCL  50 mg Oral Q6H PRN Max 4/day HOLLI Lion      Or     diphenhydrAMINE  50 mg Intramuscular Q6H PRN Eveline HOLLI Hunt      diphenhydrAMINE-zinc acetate   Topical BID PRN Eveline HOLLI Hunt      escitalopram  20 mg Oral Daily EvelineHOLLI Christy      famotidine  20 mg Oral BID Eileen GonzalezmiryamjaylenHOLLI      fluticasone  1 spray Each Nare Daily Brina Guillen MD      glycopyrrolate  1 mg Oral TID Bora Rosario MD      haloperidol  1 mg Oral Q6H PRN Veeline HOLLI Hunt      haloperidol  2.5 mg Oral Q4H PRN Max 4/day Eveline HOLLI Hunt      haloperidol  5 mg Oral Q4H PRN Max 4/day Eveline HOLLI Hunt      hydroCHLOROthiazide  12.5 mg Oral Daily HOLLI Galvan      hydrocortisone   Topical 4x Daily PRN EvelineHOLLI Christy      hydrOXYzine HCL  100 mg Oral Q6H PRN Max 4/day Ion C Holter, DO      Or    LORazepam  2 mg Intramuscular Q6H PRN Ion C Holter, DO      hydrOXYzine HCL  100 mg Oral Q6H PRN Max 4/day HOLLI Lion      Or    LORazepam  2 mg Intramuscular Q6H PRN EvelineHOLLI Christy      hydrOXYzine HCL  25 mg Oral Q6H PRN Max 4/day Ion C Holter, DO      ibuprofen  600 mg Oral Q8H PRN EvelineHOLLI Christy      Lidocaine Viscous HCl  15 mL Swish & Spit 4x Daily PRN HOLLI Galvan      loratadine  10 mg Oral Daily Brina Guillen MD      melatonin  3 mg Oral HS PRN Bora Rosario MD      metFORMIN  500 mg Oral BID With Meals HOLLI Galvan      methocarbamol  500 mg Oral Q6H PRN HOLLI Lion      nicotine polacrilex  2 mg Oral Q4H PRN Bora Rosario MD      OLANZapine  5 mg Oral Q4H PRN Max 3/day Ion C Holter, DO      Or    OLANZapine  2.5 mg Intramuscular Q4H PRN Max 3/day Ion C Holter, DO      OLANZapine  5 mg Oral Q3H PRN Max 3/day Ion C Holter, DO      Or    OLANZapine  5 mg Intramuscular Q3H PRN Max 3/day Ion C Holter, DO      OLANZapine  2.5 mg Oral Q4H PRN Max 6/day Ion C Holter, DO      ondansetron  4 mg Oral Q6H PRN HOLLI Lion      pantoprazole  20 mg Oral Early Morning HOLLI Monge       polyethylene glycol  17 g Oral Daily HOLLI Lion      polyethylene glycol  17 g Oral Daily PRN Jordan C Holter,       propranolol  10 mg Oral Q12H [facility] HOLLI Lion      senna-docusate sodium  1 tablet Oral Daily PRN Jordan C Holter,       senna-docusate sodium  2 tablet Oral BID HOLLI Lion      traZODone  50 mg Oral HS PRN HOLLI Lion      white petrolatum-mineral oil   Topical TID PRN HOLLI Lion           Risks / Benefits of Treatment:    Risks, benefits, and possible side effects of medications explained to patient and patient verbalizes understanding and agreement for treatment.    Counseling / Coordination of Care:    Patient's progress discussed with staff in treatment team meeting.  Medications, treatment progress and treatment plan reviewed with patient.    Note Share    This note was not shared with the patient due to reasonable likelihood of causing patient harm    HOLLI Torres 07/27/24

## 2024-07-27 NOTE — NURSING NOTE
Patient is blunted, pleasant, and cooperative. Visible and social. Med and meal comp. Appears disheveled. Attended coping skills. No inappropriate behaviors w/ female peer. Continuous rounding maintained.

## 2024-07-28 PROCEDURE — 99232 SBSQ HOSP IP/OBS MODERATE 35: CPT

## 2024-07-28 RX ADMIN — GLYCOPYRROLATE 1 MG: 1 TABLET ORAL at 08:56

## 2024-07-28 RX ADMIN — ARIPIPRAZOLE 15 MG: 15 TABLET ORAL at 08:57

## 2024-07-28 RX ADMIN — SENNOSIDES AND DOCUSATE SODIUM 2 TABLET: 8.6; 5 TABLET ORAL at 17:04

## 2024-07-28 RX ADMIN — GLYCOPYRROLATE 1 MG: 1 TABLET ORAL at 17:04

## 2024-07-28 RX ADMIN — METFORMIN HYDROCHLORIDE 500 MG: 500 TABLET, FILM COATED ORAL at 08:57

## 2024-07-28 RX ADMIN — PROPRANOLOL HYDROCHLORIDE 10 MG: 10 TABLET ORAL at 21:05

## 2024-07-28 RX ADMIN — FAMOTIDINE 20 MG: 20 TABLET ORAL at 08:57

## 2024-07-28 RX ADMIN — LIDOCAINE HYDROCHLORIDE 15 ML: 20 SOLUTION ORAL at 19:35

## 2024-07-28 RX ADMIN — PANTOPRAZOLE SODIUM 20 MG: 20 TABLET, DELAYED RELEASE ORAL at 06:22

## 2024-07-28 RX ADMIN — NICOTINE POLACRILEX 2 MG: 2 GUM, CHEWING ORAL at 12:56

## 2024-07-28 RX ADMIN — SENNOSIDES AND DOCUSATE SODIUM 2 TABLET: 8.6; 5 TABLET ORAL at 08:57

## 2024-07-28 RX ADMIN — FAMOTIDINE 20 MG: 20 TABLET ORAL at 17:04

## 2024-07-28 RX ADMIN — CYANOCOBALAMIN TAB 1000 MCG 1000 MCG: 1000 TAB at 08:57

## 2024-07-28 RX ADMIN — CLOZAPINE 500 MG: 100 TABLET ORAL at 21:05

## 2024-07-28 RX ADMIN — ESCITALOPRAM OXALATE 20 MG: 10 TABLET, FILM COATED ORAL at 08:57

## 2024-07-28 RX ADMIN — PROPRANOLOL HYDROCHLORIDE 10 MG: 10 TABLET ORAL at 08:58

## 2024-07-28 RX ADMIN — NICOTINE POLACRILEX 2 MG: 2 GUM, CHEWING ORAL at 21:05

## 2024-07-28 RX ADMIN — HYDROCHLOROTHIAZIDE 12.5 MG: 12.5 TABLET ORAL at 08:58

## 2024-07-28 RX ADMIN — AMLODIPINE BESYLATE 5 MG: 5 TABLET ORAL at 08:58

## 2024-07-28 RX ADMIN — GLYCOPYRROLATE 1 MG: 1 TABLET ORAL at 21:05

## 2024-07-28 RX ADMIN — METFORMIN HYDROCHLORIDE 500 MG: 500 TABLET, FILM COATED ORAL at 17:04

## 2024-07-28 RX ADMIN — LORATADINE 10 MG: 10 TABLET ORAL at 08:57

## 2024-07-28 RX ADMIN — NICOTINE POLACRILEX 2 MG: 2 GUM, CHEWING ORAL at 17:04

## 2024-07-28 NOTE — NURSING NOTE
Patient is blunted. Withdrawn and napping majority of morning. Patient c/o being tired but denied feeling ill. Refused breakfast and ate 100% of lunch. Medication compliant. Sitting close to female peer but no touching observed. Spoke about missing family. Continuous rounding maintained.

## 2024-07-28 NOTE — PLAN OF CARE
Problem: Alteration in Thoughts and Perception  Goal: Verbalize thoughts and feelings  Description: Interventions:  - Promote a nonjudgmental and trusting relationship with the patient through active listening and therapeutic communication  - Assess patient's level of functioning, behavior and potential for risk  - Engage patient in 1 on 1 interactions  - Encourage patient to express fears, feelings, frustrations, and discuss symptoms    - [place] patient to reality, help patient recognize reality-based thinking   - Administer medications as ordered and assess for potential side effects  - Provide the patient education related to the signs and symptoms of the illness and desired effects of prescribed medications  Outcome: Progressing  Goal: Refrain from acting on delusional thinking/internal stimuli  Description: Interventions:  - Monitor patient closely, per order   - Utilize least restrictive measures   - Set reasonable limits, give positive feedback for acceptable   - Administer medications as ordered and monitor of potential side effects  Outcome: Not Progressing  Goal: Agree to be compliant with medication regime, as prescribed and report medication side effects  Description: Interventions:  - Offer appropriate PRN medication and supervise ingestion; conduct AIMS, as needed   Outcome: Progressing  Goal: Attend and participate in unit activities, including therapeutic, recreational, and educational groups  Description: Interventions:  -Encourage Visitation and family involvement in care  Outcome: Not Progressing  Goal: Recognize dysfunctional thoughts, communicate reality-based thoughts at the time of discharge  Description: Interventions:  - Provide medication and psycho-education to assist patient in compliance and developing insight into his/her illness   Outcome: Not Progressing  Goal: Complete daily ADLs, including personal hygiene independently, as able  Description: Interventions:  - Observe, teach, and  assist patient with ADLS  - Monitor and promote a balance of rest/activity, with adequate nutrition and elimination   Outcome: Progressing     Problem: Ineffective Coping  Goal: Identifies ineffective coping skills  Outcome: Not Progressing  Goal: Identifies healthy coping skills  Outcome: Progressing  Goal: Demonstrates healthy coping skills  Outcome: Not Progressing  Goal: Participates in unit activities  Description: Interventions:  - Provide therapeutic environment   - Provide required programming   - Redirect inappropriate behaviors   Outcome: Not Progressing  Goal: Patient/Family participate in treatment and DC plans  Description: Interventions:  - Provide therapeutic environment  Outcome: Progressing  Goal: Patient/Family verbalizes awareness of resources  Outcome: Progressing     Problem: Depression  Goal: Verbalize thoughts and feelings  Description: Interventions:  - Assess and re-assess patient's level of risk   - Engage patient in 1:1 interactions, daily, for a minimum of 15 minutes   - Encourage patient to express feelings, fears, frustrations, hopes   Outcome: Progressing  Goal: Refrain from harming self  Description: Interventions:  - Monitor patient closely, per order   - Supervise medication ingestion, monitor effects and side effects   Outcome: Progressing  Goal: Refrain from isolation  Description: Interventions:  - Develop a trusting relationship   - Encourage socialization   Outcome: Not Progressing  Goal: Attend and participate in unit activities, including therapeutic, recreational, and educational groups  Description: Interventions:  - Provide therapeutic and educational activities daily, encourage attendance and participation, and document same in the medical record   Outcome: Not Progressing  Goal: Complete daily ADLs, including personal hygiene independently, as able  Description: Interventions:  - Observe, teach, and assist patient with ADLS  -  Monitor and promote a balance of  rest/activity, with adequate nutrition and elimination   Outcome: Progressing     Problem: Anxiety  Goal: Anxiety is at manageable level  Description: Interventions:  - Assess and monitor patient's anxiety level.   - Monitor for signs and symptoms (heart palpitations, chest pain, shortness of breath, headaches, nausea, feeling jumpy, restlessness, irritable, apprehensive).   - Collaborate with interdisciplinary team and initiate plan and interventions as ordered.  - [place] patient to unit/surroundings  - Explain treatment plan  - Encourage participation in care  - Encourage verbalization of concerns/fears  - Identify coping mechanisms  - Assist in developing anxiety-reducing skills  - Administer/offer alternative therapies  - Limit or eliminate stimulants  Outcome: Not Progressing     Problem: Alteration in Orientation  Goal: Interact with staff daily  Description: Interventions:  - Assess and re-assess patient's level of orientation  - Engage patient in 1 on 1 interactions, daily, for a minimum of 15 minutes   - Establish rapport/trust with patient   Outcome: Progressing  Goal: Express concerns related to confused thinking related to:  Description: Interventions:  - Encourage patient to express feelings, fears, frustrations, hopes  - Assign consistent caregivers   - [place]/re-orient patient as needed  - Allow comfort items, as appropriate  - Provide visual cues, signs, etc.   Outcome: Not Progressing  Goal: Allow medical examinations, as recommended  Description: Interventions:  - Provide physical/neurological exams and/or referrals, per provider   Outcome: Progressing  Goal: Cooperate with recommended testing/procedures  Description: Interventions:  - Determine need for ancillary testing  - Observe for mental status changes  - Implement falls/precaution protocol   Outcome: Progressing  Goal: Attend and participate in unit activities, including therapeutic, recreational, and educational groups  Description:  Interventions:  - Provide therapeutic and educational activities daily, encourage attendance and participation, and document same in the medical record   - Provide appropriate opportunities for reminiscence   - Provide a consistent daily routine   - Encourage family contact/visitation   Outcome: Not Progressing  Goal: Complete daily ADLs, including personal hygiene independently, as able  Description: Interventions:  - Observe, teach, and assist patient with ADLS  Outcome: Progressing     Problem: Alteration in Orientation  Goal: Interact with staff daily  Description: Interventions:  - Assess and re-assess patient's level of orientation  - Engage patient in 1 on 1 interactions, daily, for a minimum of 15 minutes   - Establish rapport/trust with patient   Outcome: Progressing  Goal: Express concerns related to confused thinking related to:  Description: Interventions:  - Encourage patient to express feelings, fears, frustrations, hopes  - Assign consistent caregivers   - [place]/re-orient patient as needed  - Allow comfort items, as appropriate  - Provide visual cues, signs, etc.   Outcome: Not Progressing  Goal: Allow medical examinations, as recommended  Description: Interventions:  - Provide physical/neurological exams and/or referrals, per provider   Outcome: Progressing  Goal: Cooperate with recommended testing/procedures  Description: Interventions:  - Determine need for ancillary testing  - Observe for mental status changes  - Implement falls/precaution protocol   Outcome: Progressing  Goal: Attend and participate in unit activities, including therapeutic, recreational, and educational groups  Description: Interventions:  - Provide therapeutic and educational activities daily, encourage attendance and participation, and document same in the medical record   - Provide appropriate opportunities for reminiscence   - Provide a consistent daily routine   - Encourage family contact/visitation   Outcome: Not  Progressing  Goal: Complete daily ADLs, including personal hygiene independently, as able  Description: Interventions:  - Observe, teach, and assist patient with ADLS  Outcome: Progressing     Problem: Electroconvulsive therapy (ECT)  Goal: Verbalizes/displays adequate comfort level or baseline comfort level  Description: Interventions:  - Encourage patient to monitor pain and request assistance  - Assess pain using appropriate pain scale  - Administer analgesics based on type and severity of pain and evaluate response  - Implement non-pharmacological measures as appropriate and evaluate response  - Consider cultural and social influences on pain and pain management  - Notify physician/advanced practitioner if interventions unsuccessful or patient reports new pain  Outcome: Not Progressing  Goal: Achieves stable or improved neurological status  Description: INTERVENTIONS  - Monitor and report changes in neurological status  - Monitor vital signs such as temperature, blood pressure, glucose, and any other labs ordered   - Initiate measures to prevent increased intracranial pressure  - Monitor for seizure activity and implement precautions if appropriate      Outcome: Progressing  Goal: Achieves maximal functionality and self care  Description: INTERVENTIONS  - Monitor swallowing and airway patency with patient fatigue and changes in neurological status  - Encourage and assist patient to increase activity and self care.   - Encourage visually impaired, hearing impaired and aphasic patients to use assistive/communication devices  Outcome: Not Progressing  Goal: Maintain or return mobility to safest level of function  Description: INTERVENTIONS:  - Assess patient's ability to carry out ADLs; assess patient's baseline for ADL function and identify physical deficits which impact ability to perform ADLs (bathing, care of mouth/teeth, toileting, grooming, dressing, etc.)  - Assess/evaluate cause of self-care deficits   -  Assess range of motion  - Assess patient's mobility  - Assess patient's need for assistive devices and provide as appropriate  - Encourage maximum independence but intervene and supervise when necessary  - Involve family in performance of ADLs  - Assess for home care needs following discharge   - Consider OT consult to assist with ADL evaluation and planning for discharge  - Provide patient education as appropriate  Outcome: Progressing  Goal: Absence of urinary retention  Description: INTERVENTIONS:  - Assess patient’s ability to void and empty bladder  - Monitor I/O  - Bladder scan as needed  - Discuss with physician/AP medications to alleviate retention as needed  - Discuss catheterization for long term situations as appropriate  Outcome: Progressing  Goal: Minimal or absence of nausea and/or vomiting  Description: INTERVENTIONS:  - Administer IV fluids if ordered to ensure adequate hydration  - Maintain NPO status until nausea and vomiting are resolved  - Nasogastric tube if ordered  - Administer ordered antiemetic medications as needed  - Provide nonpharmacologic comfort measures as appropriate  - Advance diet as tolerated, if ordered  - Consider nutrition services referral to assist patient with adequate nutrition and appropriate food choices  Outcome: Progressing  Goal: Maintains adequate nutritional intake  Description: INTERVENTIONS:  - Monitor percentage of each meal consumed  - Identify factors contributing to decreased intake, treat as appropriate  - Assist with meals as needed  - Monitor I&O, weight, and lab values if indicated  - Obtain nutrition services referral as needed  Outcome: Progressing     Problem: DISCHARGE PLANNING - CARE MANAGEMENT  Goal: Discharge to post-acute care or home with appropriate resources  Description: INTERVENTIONS:  - Conduct assessment to determine patient/family and health care team treatment goals, and need for post-acute services based on payer coverage, community  resources, and patient preferences, and barriers to discharge  - Address psychosocial, clinical, and financial barriers to discharge as identified in assessment in conjunction with the patient/family and health care team  - Arrange appropriate level of post-acute services according to patient’s   needs and preference and payer coverage in collaboration with the physician and health care team  - Communicate with and update the patient/family, physician, and health care team regarding progress on the discharge plan  - Arrange appropriate transportation to post-acute venues  Outcome: Not Progressing

## 2024-07-28 NOTE — NURSING NOTE
Dental HPI





- HPI Summary


HPI Summary: 





had wisdom teeth removed about a month ago, 3 days ago noticed swelling lower L 

gum , becoming bigger and tender. denies tooth pain. has taken no med so far





- History of Current Complaint


Chief Complaint: Carloz


Stated Complaint: MOUTH PAIN


Time Seen by Provider: 06/22/19 16:40


Hx Obtained From: Patient


Hx Last Menstrual Period: no periods


Pregnant?: No


Onset/Duration: Gradual Onset


Severity: Mild


Pain Intensity: 2


Aggravating Factor(s): Other - pressure on melinda


Related History: Swelling





- Allergies/Home Medications


Allergies/Adverse Reactions: 


 Allergies











Allergy/AdvReac Type Severity Reaction Status Date / Time


 


No Known Allergies Allergy   Verified 06/22/19 16:37











Home Medications: 


 Home Medications





Lithium Carbonate [Lithium Carbonate 600 mg cap] 600 mg PO DAILY 06/22/19 [

History Confirmed 06/22/19]


Lurasidone(*) [Latuda] 40 mg PO DAILY 06/22/19 [History Confirmed 06/22/19]











PMH/Surg Hx/FS Hx/Imm Hx


Previously Healthy: Yes


Psychological History: Anxiety





- Surgical History


Surgical History: Yes


Surgery Procedure, Year, and Place: HYMENECTOMY





- Family History


Known Family History: Positive: None, Other - SI





- Social History


Occupation: Employed Full-time


Lives: With Family


Alcohol Use: Weekly


Alcohol Amount: 4X/ week


Substance Use Type: Marijuana


Substance Use Comment - Amount & Last Used: a few times/ week


Smoking Status (MU): Light Every Day Tobacco Smoker


Amount Used/How Often: 5 cigs/ week


Household Exposure Type: Cigarettes


Cessation Counseling: Patient Advised to Stop





- Immunization History


Most Recent Influenza Vaccination: n/a


Most Recent Pneumonia Vaccination: n/a





Review of Systems


All Other Systems Reviewed And Are Negative: Yes


Constitutional: Positive: Negative.  Negative: Fever, Chills


ENT: Negative: Sore Throat, Ear Ache, Sinus Congestion, Sinus Pain/Tenderness


Respiratory: Positive: Negative


Cardiovascular: Positive: Negative


Is Patient Immunocompromised?: No





Physical Exam


Triage Information Reviewed: Yes


Appearance: Well-Appearing, No Pain Distress, Well-Nourished


Vital Signs: 


 Initial Vital Signs











Temp  98 F   06/22/19 16:39


 


Pulse  62   06/22/19 16:39


 


Resp  18   06/22/19 16:39


 


BP  103/65   06/22/19 16:39


 


Pulse Ox  100   06/22/19 16:39











Dental: Positive: Abscess @ - palpable almond sized, tender mass lower L gum 

line, no pustule or drainage


Neck exam: Normal


Neck: Positive: No Lymphadenopathy


Respiratory Exam: Normal


Respiratory: Positive: Lungs clear


Cardiovascular Exam: Normal


Neurological Exam: Normal


Psychological Exam: Normal


Skin Exam: Normal





Dental Complaint Course/Dx





- Differential Dx/Diagnosis


Differential Diagnosis/Dx: Dental Abscess, Dental Caries, Gingivitis, Other - 

abscess


Provider Diagnosis: 


 Dental abscess








Discharge





- Sign-Out/Discharge


Documenting (check all that apply): Patient Departure


All imaging exams completed and their final reports reviewed: No Studies





- Discharge Plan


Condition: Good


Disposition: HOME


Prescriptions: 


Penicillin  MG TAB(NF) [Penicillin  mg Tab] 500 mg PO TID #30 tab


Patient Education Materials:  Dental Abscess (ED)


Referrals: 


Liz Washington MD [Primary Care Provider] - 


Additional Instructions: 


start penicillin and take as prescribed


use ibuprofen 600mg every 6 hours as needed for pain





Make sure to follow-up with your oral surgeon next week as planned





- Billing Disposition and Condition


Condition: GOOD


Disposition: Home Patient remains blunted and sitting close w/ female peer. Med and meal comp. No behavioral issues.

## 2024-07-28 NOTE — PROGRESS NOTES
Progress Note - Behavioral Health   Alberto Berumen 28 y.o. male MRN: [id number]  Unit/Bed#: [facility] 108-02 Encounter: [id number]      Subjective:     Documentation, nursing notes, medication reconciliation, labs, and vitals reviewed. Patient was seen for continuing care and reviewed with treatment team.  No acute events over the past 24 hours. Per nursing report, patient received visit from aunt which went well with patient smiling throughout visit. No medication changes over the past 24 hours.  He has been using lidocaine oral solution for mouth sore since biting tongue during previous ECT session.     On evaluation today, patient is laying in bed resting.  He is cooperative with assessment, although hurries through answers as to limit communication with provider.  He denies passive or active SI/HI with plan or intent.  He does report ongoing AH of voices although declines to elaborate.  He does not appear to be responding to IS during today's conversation.  He reports he is sleeping and eating well.     Continues to tolerate current medications with no adverse effects.     No self-harming/suicidal ideation, plan, or intent upon direct inquiry. No thoughts to harm others.  No agitation or aggression noted. Does not appear overtly manic. Offers no further complaints.       Psychiatric ROS:  Behavior over the last 24 hours: unchanged  Sleep: normal  Appetite: normal  Medication side effects: No   ROS: no complaints, all other systems are negative      Mental Status Evaluation:    Appearance:  casually dressed, marginal hygiene   Behavior:  cooperative, calm, guarded   Speech:  scant, soft   Mood:  depressed   Affect:  constricted   Thought Process:  coherent, goal directed   Associations: intact associations   Thought Content:  some paranoia   Perceptual Disturbances: auditory hallucinations of voices, denies visual hallucinations when asked   Risk Potential: Suicidal ideation - None at present  Homicidal ideation -  None at present  Potential for aggression - No   Sensorium:  oriented to person, place, and time/date   Memory:  recent and remote memory grossly intact   Consciousness:  alert and awake   Attention/Concentration: attention span and concentration are age appropriate   Insight:  fair   Judgment: fair   Gait/Station: normal gait/station   Motor Activity: no abnormal movements       Vital signs in last 24 hours:    Temp:  [98 °F (36.7 °C)-98.5 °F (36.9 °C)] 98 °F (36.7 °C)  HR:  [82-99] 87  Resp:  [16-18] 16  BP: (106-147)/(55-89) 127/67    Laboratory results: I have personally reviewed all pertinent laboratory/tests results    Results from the past 24 hours: No results found for this or any previous visit (from the past 24 hour(s)).      Progress Toward Goals: no significant improvement today    Suicide/Homicide Risk Assessment:    Risk of Harm to Self:   Nursing Suicide Risk Assessment Last 24 hours: C-SSRS Risk (Since Last Contact)  Calculated C-SSRS Risk Score (Since Last Contact): No Risk Indicated    Risk of Harm to Others:  Nursing Homicide Risk Assessment: Violence Risk to Others: Denies within past 6 months    Assessment & Plan   Principal Problem:    Schizoaffective disorder, bipolar type (HCC)  Active Problems:    GERD (gastroesophageal reflux disease)    Medical clearance for psychiatric admission    Tobacco abuse    T wave inversion in EKG    Chronic idiopathic constipation    Confluent and reticulate papillomatosis    Primary hypertension    Elevated hemoglobin A1c    Bilateral lower extremity edema      Recommended Treatment:     Planned medication and treatment changes:    All current active medications have been reviewed  Encourage group therapy, milieu therapy and occupational therapy  Behavioral Health checks every 7 minutes  Continue with SLIM medical management as indicated  Disposition planning ongoing      Continue current medications:    Current Facility-Administered Medications   Medication  Dose Route Frequency Provider Last Rate    acetaminophen  650 mg Oral Q4H PRN Jordan C Holter, DO      acetaminophen  650 mg Oral Q6H PRN HOLLI Lion      aluminum-magnesium hydroxide-simethicone  30 mL Oral Q4H PRN Jordan C Holter, DO      amLODIPine  5 mg Oral Daily HOLLI Lion      ARIPiprazole  15 mg Oral Daily Bora Rosario MD      Artificial Tears  1 drop Both Eyes Q3H PRN Jordan C Holter, DO      atropine  1 drop Sublingual Daily PRN HOLLI Lion      haloperidol lactate  2.5 mg Intramuscular Q4H PRN Max 4/day HOLLI Lion      And    LORazepam  1 mg Intramuscular Q4H PRN Max 4/day HOLLI Lion      And    benztropine  0.5 mg Intramuscular Q4H PRN Max 4/day HOLLI Lion      benztropine  1 mg Intramuscular Q4H PRN Max 6/day Jordan C Holter, DO      haloperidol lactate  5 mg Intramuscular Q4H PRN Max 4/day HOLLI Lion      And    LORazepam  2 mg Intramuscular Q4H PRN Max 4/day HOLLI Lion      And    benztropine  1 mg Intramuscular Q4H PRN Max 4/day HOLLI Lion      benztropine  1 mg Oral Q4H PRN Max 6/day HOLLI Lion      benztropine  1 mg Oral Q4H PRN Max 6/day Jordan C Holter, DO      bisacodyl  10 mg Rectal Daily PRN HOLLI Lion      calcium carbonate  500 mg Oral BID PRN HOLLI Monge      cloZAPine  500 mg Oral HS Bora Rosario MD      cyanocobalamin  1,000 mcg Oral Daily HOLLI Galvan      hydrOXYzine HCL  50 mg Oral Q6H PRN Max 4/day Jordan C Holter, DO      Or    diphenhydrAMINE  50 mg Intramuscular Q6H PRN Jordan C Holter,       hydrOXYzine HCL  50 mg Oral Q6H PRN Max 4/day HOLLI Lion      Or    diphenhydrAMINE  50 mg Intramuscular Q6H PRN HOLLI Lion      diphenhydrAMINE-zinc acetate   Topical BID PRN HOLLI Lion      escitalopram  20 mg Oral Daily HOLLI Lion      famotidine  20 mg Oral BID HOLLI Monge      fluticasone  1 spray Each Nare Daily Brina  MD Wilmer      glycopyrrolate  1 mg Oral TID Bora Rosario MD      haloperidol  1 mg Oral Q6H PRN HOLLI Lion      haloperidol  2.5 mg Oral Q4H PRN Max 4/day HOLLI Lion      haloperidol  5 mg Oral Q4H PRN Max 4/day HOLLI Lion      hydroCHLOROthiazide  12.5 mg Oral Daily HOLLI Galvan      hydrocortisone   Topical 4x Daily PRN HOLLI Lion      hydrOXYzine HCL  100 mg Oral Q6H PRN Max 4/day [facility] Holter, DO      Or    LORazepam  2 mg Intramuscular Q6H PRN [facility] Holter, DO      hydrOXYzine HCL  100 mg Oral Q6H PRN Max 4/day HOLLI Lion      Or    LORazepam  2 mg Intramuscular Q6H PRN HOLLI Lion      hydrOXYzine HCL  25 mg Oral Q6H PRN Max 4/day [facility] Holter, DO      ibuprofen  600 mg Oral Q8H PRN HOLLI Lion      Lidocaine Viscous HCl  15 mL Swish & Spit 4x Daily PRN HOLLI Galvan      loratadine  10 mg Oral Daily Brina Guillen MD      melatonin  3 mg Oral HS PRN Bora Rosario MD      metFORMIN  500 mg Oral BID With Meals HOLLI Galvan      methocarbamol  500 mg Oral Q6H PRN HOLLI Lion      nicotine polacrilex  2 mg Oral Q4H PRN Bora Rosario MD      OLANZapine  5 mg Oral Q4H PRN Max 3/day [facility] Holter, DO      Or    OLANZapine  2.5 mg Intramuscular Q4H PRN Max 3/day [facility] Holter, DO      OLANZapine  5 mg Oral Q3H PRN Max 3/day [facility] Holter, DO      Or    OLANZapine  5 mg Intramuscular Q3H PRN Max 3/day [facility] Holter, DO      OLANZapine  2.5 mg Oral Q4H PRN Max 6/day [facility] Holter, DO      ondansetron  4 mg Oral Q6H PRN HOLLI Lion      pantoprazole  20 mg Oral Early Morning Eileen Jensen, HOLLI      polyethylene glycol  17 g Oral Daily HOLLI Lion      polyethylene glycol  17 g Oral Daily PRN [facility] Holter, DO      propranolol  10 mg Oral Q12H KAYLIE HOLLI Lion      senna-docusate sodium  1 tablet Oral Daily PRN Jordan C Holter, DO      senna-docusate sodium  2 tablet Oral  BID HOLLI Lion      traZODone  50 mg Oral HS PRN HOLLI Lion      white petrolatum-mineral oil   Topical TID PRN HOLLI Lion           Risks / Benefits of Treatment:    Risks, benefits, and possible side effects of medications explained to patient and patient verbalizes understanding and agreement for treatment.    Counseling / Coordination of Care:    Patient's progress discussed with staff in treatment team meeting.  Medications, treatment progress and treatment plan reviewed with patient.    Note Share    This note was not shared with the patient due to reasonable likelihood of causing patient harm    HOLLI Torres 07/28/24

## 2024-07-28 NOTE — NURSING NOTE
Refused weekly weight. Lungs CTA. Active bowel sounds in all quadrants. Last BM 7/27. No edema observed. Skin is intact. Tongue injury improved. No laceration observed. Patient states he can still feel a lump and utilizing PRN lidocaine swish and spit. No new concerns verbalized.

## 2024-07-28 NOTE — NURSING NOTE
"Visible in milieu, Social with select peers, pleasant cooperative, reports hearing the voices \"they want to kill me\" Requested Lidocaine viscus -swish &  spit  (1756) for mouth discomfort. Good appetite. Compliant with medications. Requested Nicorette gum at 1755. No behavior issues, Emotional support provided. He brightens when speaking about his Aunt Kell, whom he had a visit with today.  "

## 2024-07-29 PROCEDURE — 99232 SBSQ HOSP IP/OBS MODERATE 35: CPT | Performed by: PSYCHIATRY & NEUROLOGY

## 2024-07-29 RX ADMIN — METFORMIN HYDROCHLORIDE 500 MG: 500 TABLET, FILM COATED ORAL at 09:06

## 2024-07-29 RX ADMIN — HYDROCHLOROTHIAZIDE 12.5 MG: 12.5 TABLET ORAL at 09:06

## 2024-07-29 RX ADMIN — LORATADINE 10 MG: 10 TABLET ORAL at 09:06

## 2024-07-29 RX ADMIN — FAMOTIDINE 20 MG: 20 TABLET ORAL at 17:21

## 2024-07-29 RX ADMIN — FLUTICASONE PROPIONATE 1 SPRAY: 50 SPRAY, METERED NASAL at 09:09

## 2024-07-29 RX ADMIN — GLYCOPYRROLATE 1 MG: 1 TABLET ORAL at 17:21

## 2024-07-29 RX ADMIN — PROPRANOLOL HYDROCHLORIDE 10 MG: 10 TABLET ORAL at 09:06

## 2024-07-29 RX ADMIN — CYANOCOBALAMIN TAB 1000 MCG 1000 MCG: 1000 TAB at 09:06

## 2024-07-29 RX ADMIN — METFORMIN HYDROCHLORIDE 500 MG: 500 TABLET, FILM COATED ORAL at 17:21

## 2024-07-29 RX ADMIN — GLYCOPYRROLATE 1 MG: 1 TABLET ORAL at 09:06

## 2024-07-29 RX ADMIN — ESCITALOPRAM OXALATE 20 MG: 10 TABLET, FILM COATED ORAL at 09:06

## 2024-07-29 RX ADMIN — NICOTINE POLACRILEX 2 MG: 2 GUM, CHEWING ORAL at 17:21

## 2024-07-29 RX ADMIN — CLOZAPINE 500 MG: 100 TABLET ORAL at 21:25

## 2024-07-29 RX ADMIN — NICOTINE POLACRILEX 2 MG: 2 GUM, CHEWING ORAL at 09:07

## 2024-07-29 RX ADMIN — GLYCOPYRROLATE 1 MG: 1 TABLET ORAL at 21:25

## 2024-07-29 RX ADMIN — NICOTINE POLACRILEX 2 MG: 2 GUM, CHEWING ORAL at 13:09

## 2024-07-29 RX ADMIN — ARIPIPRAZOLE 15 MG: 15 TABLET ORAL at 09:06

## 2024-07-29 RX ADMIN — SENNOSIDES AND DOCUSATE SODIUM 2 TABLET: 8.6; 5 TABLET ORAL at 17:20

## 2024-07-29 RX ADMIN — FAMOTIDINE 20 MG: 20 TABLET ORAL at 09:06

## 2024-07-29 RX ADMIN — NICOTINE POLACRILEX 2 MG: 2 GUM, CHEWING ORAL at 21:26

## 2024-07-29 RX ADMIN — PROPRANOLOL HYDROCHLORIDE 10 MG: 10 TABLET ORAL at 21:25

## 2024-07-29 RX ADMIN — AMLODIPINE BESYLATE 5 MG: 5 TABLET ORAL at 09:06

## 2024-07-29 RX ADMIN — PANTOPRAZOLE SODIUM 20 MG: 20 TABLET, DELAYED RELEASE ORAL at 06:19

## 2024-07-29 RX ADMIN — SENNOSIDES AND DOCUSATE SODIUM 2 TABLET: 8.6; 5 TABLET ORAL at 09:06

## 2024-07-29 NOTE — PLAN OF CARE
Problem: Ineffective Coping  Goal: Identifies ineffective coping skills  Outcome: Progressing  Goal: Identifies healthy coping skills  Outcome: Progressing  Goal: Demonstrates healthy coping skills  Outcome: Progressing  Goal: Participates in unit activities  Description: Interventions:  - Provide therapeutic environment   - Provide required programming   - Redirect inappropriate behaviors   Outcome: Progressing   Attended 26/43 of the groups offered last week.

## 2024-07-29 NOTE — PROGRESS NOTES
07/29/24 0746   Team Meeting   Meeting Type Daily Rounds   Team Members Present   Team Members Present Physician;Nurse;;Other (Discipline and Name)   Patient/Family Present   Patient Present No   Patient's Family Present No     In attendance:  Dr. Alex Thomas, MD Dr. Jordan Holter, DO Guy Taylor, RN  Luisana Alvarado, [facility] [place] [facility]  Carla Lux, [facility] [place]  MATILDA Daniel.S.    Groups: 4/8    Pt had successful visit with 3 aunts over the weekend. Pt brightened after the visit. Pt has tentative ACT intake in September once ECTs are completed.

## 2024-07-29 NOTE — NURSING NOTE
Patient is calm and cooperative. Visible and social w/ select peers. Ate 25% of breakfast and 100% of lunch. Medication compliant. Appears disheveled. Attended spirituality. No physical touching w/ female peer. Continuous rounding maintained.

## 2024-07-29 NOTE — NURSING NOTE
Alberto maintained on ongoing SAFE precaution without incident on this shift.  Awake, alert , visible, pleasant, withdrawn and cooperative upon approach.   Attended and participated in 2 out of 7 groups today.  Continues to be compliant with medication regimen and had snack.  Spend most of his time taking with a female peer.  Both parties are maintaining proper distance, no inappropriate observed.  Alberto had PRN Lidocaine viscous mucosal solution 15ml at 1935 or oral discomfort.  He reported that it getting better.  Denies any depression or anxiety.  No delusion  or A/T/V hallucination noted.  Behavioral control

## 2024-07-29 NOTE — PROGRESS NOTES
Psychiatry Progress Note [facility]    Alberto Berumen 28 y.o. male MRN: [id number]  Unit/Bed#: [facility] 108-02 Encounter: [id number]  Code Status: Level 1 - Full Code    PCP: Joce Juan MD    Date of Admission:  3/29/2024 2008   Date of Service:  07/29/24    Patient Active Problem List   Diagnosis    GERD (gastroesophageal reflux disease)    Medical clearance for psychiatric admission    Schizoaffective disorder, bipolar type (HCC)    Tobacco abuse    T wave inversion in EKG    Syringoma    Chronic idiopathic constipation    Vitamin B 12 deficiency    Vitamin D deficiency    Confluent and reticulate papillomatosis    Class 2 obesity in adult    Primary hypertension    Elevated hemoglobin A1c    Bilateral lower extremity edema         Review of systems: He has been using lidocaine swish in his mouth after allegedly biting his tongue during ECT last Friday otherwise unremarkable. He has been improving with getting too close to peers.   Psychiatric Diagnosis: Schizoaffective bipolar    Assessment  Overall Status: He reports the same threatening voices but states he has been able to ignore them. He has been feeling sleepy recently.   Certification Statement: The patient will continue to require additional inpatient hospital stay due to ongoing threatening voices with lack of strong response from medications and ECT so far       Medications:   - Clozapine 500mg PO at bedtime  - Propranolol 10mg PO every 12 hours as needed for anxiety  - Abilify 15mg PO at bedtime  - Lexapro 20mg PO daily  - Atropine eyedrops sublingual for drooling of saliva  - Senna 2 tablets twice a day for constipation  Side effects from treatment: none  Medication changes   none   Medication education   Risks side effects benefits and precautions of medications discussed with patient and he did verbalize an understanding about risks for metabolic syndrome from being on neuroleptics and is form tardive dyskinesia etc.  All  medications reviewed and I recommend that they be continued for symptom management   Understanding of medications: some   Justification for dual anti-psychotics: n/a    Non-pharmacological treatments  Continue with individual, group, milieu and occupational therapy using recovery principles and psycho-education about accepting illness and the need for treatment.  Continue ECT q2 weekly for maintenance x 6, patient has 4 more treatments left  Refused to see dietician, agreeable to more exercises for obesity  Plan to explore ACT team with discussion with aunt  Diane was replaced with Abilify due to previously high prolactin level    Safety  Safety and communication plan established to target dynamic risk factors discussed above.    Discharge Plan   Plan is to discharge back to his aunt with ACT team once ECT is completed    Interval Progress   He went to 4/8 groups on Friday. He has been improving with not getting too close to select peers and has been redirectable and cooperative with staff. His visit with his aunts was successful over the weekend and he was bright both during and after the visit. He is reporting the same threatening voices that tell him they are going to kill him and his family but states he can ignore them and they are improved after ECT treatments. He has been compliant with medications. He needed no behavioral PRNs over the weekend. He has bee social and pleasant per nursing staff.   Acceptance by patient: accepting  Hopefulness in recovery: living with aunt and sister  Involved in reintegration process: aunt, sister  Trusting in relationship with psychiatrist: appears to be more trusting  Sleep: good, states he sleeps a lot  Appetite: fair, improving, ate breakfast this morning  Compliance with Medications: compliant  Group attendance: 4/8 groups on Friday  Significant events: He states voices are still present stating the same things but more manageable after ECT treatments. He has been  social and pleasant. He has been improving with behaviors with select peers and not getting as close to them. He has 4 ECT treatments left.       Mental Status Exam  Appearance: marginal hygiene, wearing hospital clothes, overweight, dreadlocked hair, wearing restrepo up with his hoodie  Behavior: cooperative, calm, slow responses  Speech: normal volume, decreased rate, slow  Mood: depressed, dysphoric, anxious  Affect: constricted, mood-congruent  Thought Process: organized, goal directed, decreased rate of thoughts  Thought Content: paranoid ideation, intrusive thoughts, preoccupied, paranoia is due to voices he hears that are threatening to kill him and his family which makes him feel depressed, he denies suicidal or homicidal ideations, denies phobias, obsessions, compulsions, distorted body perceptions  Perceptual Disturbances: no visual hallucinations, does not appear responding to internal stimuli, auditory hallucinations of threatening voices that tell him they are going to kill him and his family, chronic, no commands  Hx Risk Factors: chronic psychiatric problems, chronic anxiety symptoms, chronic psychotic symptoms  Sensorium: alert  Cognition: recent and remote memory grossly intact  Consciousness: alert and awake  Attention: attention span and concentration are age appropriate  Intellect: appears to be of average intelligence  Insight: intact  Judgement: intact  Motor Activity: no abnormal movements     Vitals  Temp:  [97.1 °F (36.2 °C)-98.1 °F (36.7 °C)] 98.1 °F (36.7 °C)  HR:  [] 79  Resp:  [16] 16  BP: (122-141)/(63-89) 122/63  SpO2:  [99 %-100 %] 100 %  No intake or output data in the 24 hours ending 07/29/24 0858    Lab Results: All Labs Within Last 24 Hours Reviewed  Results from last 7 days   Lab Units 07/25/24  2043   WBC Thousand/uL 12.69*   RBC Million/uL 5.71*   HEMOGLOBIN g/dL 13.6   HEMATOCRIT % 45.3   MCV fL 79*   PLATELETS Thousands/uL 303   TOTAL NEUT ABS Thousands/µL 7.28   CLOZAPINE  LVL ng/mL 430       Current Facility-Administered Medications   Medication Dose Route Frequency Provider Last Rate    acetaminophen  650 mg Oral Q4H PRN Jordan C Holter,       acetaminophen  650 mg Oral Q6H PRN HOLLI Lion      aluminum-magnesium hydroxide-simethicone  30 mL Oral Q4H PRN Jordan C Holter, DO      amLODIPine  5 mg Oral Daily HOLLI Lion      ARIPiprazole  15 mg Oral Daily Bora Rosario MD      Artificial Tears  1 drop Both Eyes Q3H PRN Jordan C Holter, DO      atropine  1 drop Sublingual Daily PRN HLOLI Lion      haloperidol lactate  2.5 mg Intramuscular Q4H PRN Max 4/day HOLLI Lion      And    LORazepam  1 mg Intramuscular Q4H PRN Max 4/day HOLLI Lion      And    benztropine  0.5 mg Intramuscular Q4H PRN Max 4/day HOLLI Lion      benztropine  1 mg Intramuscular Q4H PRN Max 6/day Jordan C Holter, DO      haloperidol lactate  5 mg Intramuscular Q4H PRN Max 4/day HOLLI Lion      And    LORazepam  2 mg Intramuscular Q4H PRN Max 4/day HOLLI Lion      And    benztropine  1 mg Intramuscular Q4H PRN Max 4/day HOLLI Lion      benztropine  1 mg Oral Q4H PRN Max 6/day HOLLI Lion      benztropine  1 mg Oral Q4H PRN Max 6/day Jordan C Holter, DO      bisacodyl  10 mg Rectal Daily PRN HOLLI Lion      calcium carbonate  500 mg Oral BID PRN HOLLI Monge      cloZAPine  500 mg Oral HS Bora Rosario MD      cyanocobalamin  1,000 mcg Oral Daily HOLLI Galvan      hydrOXYzine HCL  50 mg Oral Q6H PRN Max 4/day Jordan C Holter, DO      Or    diphenhydrAMINE  50 mg Intramuscular Q6H PRN Jordan C Holter, DO      hydrOXYzine HCL  50 mg Oral Q6H PRN Max 4/day HOLLI Lion      Or    diphenhydrAMINE  50 mg Intramuscular Q6H PRN HOLLI Lion      diphenhydrAMINE-zinc acetate   Topical BID PRN Eveline Hangey, CRNP      escitalopram  20 mg Oral Daily HOLLI Lion      famotidine  20 mg Oral BID  HOLLI Monge      fluticasone  1 spray Each Nare Daily Brina Guillen MD      glycopyrrolate  1 mg Oral TID Bora Rosario MD      haloperidol  1 mg Oral Q6H PRN HOLLI Lion      haloperidol  2.5 mg Oral Q4H PRN Max 4/day HOLLI Lion      haloperidol  5 mg Oral Q4H PRN Max 4/day HOLLI Lion      hydroCHLOROthiazide  12.5 mg Oral Daily HOLLI Galvan      hydrocortisone   Topical 4x Daily PRN HOLLI Lion      hydrOXYzine HCL  100 mg Oral Q6H PRN Max 4/day [facility] Holter, DO      Or    LORazepam  2 mg Intramuscular Q6H PRN [facility] Holter, DO      hydrOXYzine HCL  100 mg Oral Q6H PRN Max 4/day HOLLI Lion      Or    LORazepam  2 mg Intramuscular Q6H PRN Eveline Hunt, HOLLI      hydrOXYzine HCL  25 mg Oral Q6H PRN Max 4/day [facility] Holter, DO      ibuprofen  600 mg Oral Q8H PRN HOLLI Lion      Lidocaine Viscous HCl  15 mL Swish & Spit 4x Daily PRN HOLLI Galvan      loratadine  10 mg Oral Daily Brina Guillen MD      melatonin  3 mg Oral HS PRN Bora Rosario MD      metFORMIN  500 mg Oral BID With Meals HOLLI Galvan      methocarbamol  500 mg Oral Q6H PRN HOLLI Lion      nicotine polacrilex  2 mg Oral Q4H PRN Bora Rosario MD      OLANZapine  5 mg Oral Q4H PRN Max 3/day [facility] Holter, DO      Or    OLANZapine  2.5 mg Intramuscular Q4H PRN Max 3/day [facility] Holter, DO      OLANZapine  5 mg Oral Q3H PRN Max 3/day [facility] Holter, DO      Or    OLANZapine  5 mg Intramuscular Q3H PRN Max 3/day [facility] Holter, DO      OLANZapine  2.5 mg Oral Q4H PRN Max 6/day [facility] Holter, DO      ondansetron  4 mg Oral Q6H PRN Eveline Hunt, HOLLI      pantoprazole  20 mg Oral Early Morning HOLLI Monge      polyethylene glycol  17 g Oral Daily Eveline Hangey, CRNP      polyethylene glycol  17 g Oral Daily PRN Jordan C Holter, DO      propranolol  10 mg Oral Q12H [facility] HOLLI Lion      senna-docusate sodium  1 tablet  Oral Daily PRN Jordan C Holter, DO senna-docusate sodium  2 tablet Oral BID HOLLI Lion      traZODone  50 mg Oral HS PRN HOLLI Lion      white petrolatum-mineral oil   Topical TID PRN HOLLI Lion         Counseling / Coordination of Care: Total floor / unit time spent today 15 minutes. Greater than 50% of total time was spent with the patient and / or family counseling and / or somewhat receptive to supportive listening and teaching positive coping skills to deal with symptom mangement.     Patient's Rights, confidentiality and exceptions to confidentiality, use of automated medical record, Behavioral Health Services staff access to medical record, and consent to treatment reviewed.    This note has been dictated and hence there may be problems with punctuation, spelling and formatting and if anyone has any concerns please address them to Dr. Rosario   This note is not shared with patient due to potential for making patient's condition worse by knowing the content of the note.

## 2024-07-29 NOTE — SOCIAL WORK
SW placed call to pt's sister Marleny to attempt to help coordinate a virtual visit. Sister's phone is currently disconnected.

## 2024-07-30 PROCEDURE — 99232 SBSQ HOSP IP/OBS MODERATE 35: CPT | Performed by: PSYCHIATRY & NEUROLOGY

## 2024-07-30 RX ADMIN — FAMOTIDINE 20 MG: 20 TABLET ORAL at 17:07

## 2024-07-30 RX ADMIN — ESCITALOPRAM OXALATE 20 MG: 10 TABLET, FILM COATED ORAL at 08:38

## 2024-07-30 RX ADMIN — PROPRANOLOL HYDROCHLORIDE 10 MG: 10 TABLET ORAL at 08:38

## 2024-07-30 RX ADMIN — SENNOSIDES AND DOCUSATE SODIUM 2 TABLET: 8.6; 5 TABLET ORAL at 17:07

## 2024-07-30 RX ADMIN — PANTOPRAZOLE SODIUM 20 MG: 20 TABLET, DELAYED RELEASE ORAL at 06:23

## 2024-07-30 RX ADMIN — METFORMIN HYDROCHLORIDE 500 MG: 500 TABLET, FILM COATED ORAL at 17:07

## 2024-07-30 RX ADMIN — NICOTINE POLACRILEX 2 MG: 2 GUM, CHEWING ORAL at 21:12

## 2024-07-30 RX ADMIN — FAMOTIDINE 20 MG: 20 TABLET ORAL at 08:38

## 2024-07-30 RX ADMIN — PROPRANOLOL HYDROCHLORIDE 10 MG: 10 TABLET ORAL at 21:12

## 2024-07-30 RX ADMIN — GLYCOPYRROLATE 1 MG: 1 TABLET ORAL at 08:39

## 2024-07-30 RX ADMIN — GLYCOPYRROLATE 1 MG: 1 TABLET ORAL at 21:12

## 2024-07-30 RX ADMIN — POLYETHYLENE GLYCOL 3350 17 G: 17 POWDER, FOR SOLUTION ORAL at 08:37

## 2024-07-30 RX ADMIN — METFORMIN HYDROCHLORIDE 500 MG: 500 TABLET, FILM COATED ORAL at 08:38

## 2024-07-30 RX ADMIN — AMLODIPINE BESYLATE 5 MG: 5 TABLET ORAL at 08:38

## 2024-07-30 RX ADMIN — SENNOSIDES AND DOCUSATE SODIUM 2 TABLET: 8.6; 5 TABLET ORAL at 08:38

## 2024-07-30 RX ADMIN — GLYCOPYRROLATE 1 MG: 1 TABLET ORAL at 17:07

## 2024-07-30 RX ADMIN — NICOTINE POLACRILEX 2 MG: 2 GUM, CHEWING ORAL at 17:31

## 2024-07-30 RX ADMIN — CLOZAPINE 500 MG: 100 TABLET ORAL at 21:12

## 2024-07-30 RX ADMIN — HYDROCHLOROTHIAZIDE 12.5 MG: 12.5 TABLET ORAL at 08:38

## 2024-07-30 RX ADMIN — NICOTINE POLACRILEX 2 MG: 2 GUM, CHEWING ORAL at 09:01

## 2024-07-30 RX ADMIN — ARIPIPRAZOLE 15 MG: 15 TABLET ORAL at 08:38

## 2024-07-30 RX ADMIN — CYANOCOBALAMIN TAB 1000 MCG 1000 MCG: 1000 TAB at 08:38

## 2024-07-30 RX ADMIN — LORATADINE 10 MG: 10 TABLET ORAL at 08:43

## 2024-07-30 NOTE — NURSING NOTE
[address]   Admission Date: 12/25/2022         [address] Nephrology Progress Note: 12/28/2022    Follow-up for: ESRD    The patient's chart is reviewed and the patient is discussed with the staff.     Subjective:   Pt seen and examined in room  ROS:  Gen - no fever, no chills, appetite unchanged  CV - no chest pain, no palpitation  Lung - no shortness of breath, no cough  Abd - tenderness- controlled, no nausea/vomiting, no diarrhea  Ext - no edema    Current Facility-Administered Medications   Medication Dose Route Frequency    tuberculin injection 5 Units  5 Units IntraDERmal Once    pantoprazole (PROTONIX) 40 mg in sodium chloride (PF) 0.9 % 10 mL injection  40 mg IntraVENous Daily    morphine (PF) injection 2 mg  2 mg IntraVENous Q2H PRN    morphine injection 4 mg  4 mg IntraVENous Q2H PRN    sodium chloride flush 0.9 % injection 5-40 mL  5-40 mL IntraVENous 2 times per day    sodium chloride flush 0.9 % injection 5-40 mL  5-40 mL IntraVENous PRN    0.9 % sodium chloride infusion   IntraVENous PRN    [Held by provider] heparin (porcine) injection 5,000 Units  5,000 Units SubCUTAneous 3 times per day    ondansetron (ZOFRAN-ODT) disintegrating tablet 4 mg  4 mg Oral Q8H PRN    Or    ondansetron (ZOFRAN) injection 4 mg  4 mg IntraVENous Q6H PRN    acetaminophen (TYLENOL) tablet 650 mg  650 mg Oral Q6H PRN    Or    acetaminophen (TYLENOL) suppository 650 mg  650 mg Rectal Q6H PRN    cinacalcet (SENSIPAR) tablet 90 mg  90 mg Oral Nightly    [Held by provider] NIFEdipine (PROCARDIA XL) extended release tablet 90 mg  90 mg Oral Nightly    finasteride (PROSCAR) tablet 5 mg  5 mg Oral Nightly    traZODone (DESYREL) tablet 50 mg  50 mg Oral Nightly    piperacillin-tazobactam (ZOSYN) 3,375 mg in sodium chloride 0.9 % 50 mL IVPB (mini-bag)  3,375 mg IntraVENous Q12H    vancomycin (VANCOCIN) intermittent dosing (placeholder)   Other RX Placeholder    oxyCODONE (ROXICODONE) immediate release Alberto maintained on ongoing SAFE precaution without incident on this shift.  Awake, alert , visible, pleasant, withdrawn and cooperative upon approach.   Attended and participated in 5 out of 9 groups today.  Continues to be compliant with medication regimen and had snack.  Spend most of his time taking with a female peer.  Both parties are maintaining proper distance, no inappropriate observed.  Denies any oral pain or discomfort on this shift.  Denies any depression or anxiety.  No delusion  or A/T/V hallucination noted.  Behavioral control         tablet 5 mg  5 mg Oral Q6H PRN    valsartan (DIOVAN) tablet 320 mg  320 mg Oral Daily    And    [Held by provider] hydroCHLOROthiazide (HYDRODIURIL) tablet 25 mg  25 mg Oral Daily    cloNIDine (CATAPRES) 0.3 MG/24HR 1 patch  1 patch TransDERmal Weekly         Objective:     Vitals:    12/27/22 2000 12/27/22 2335 12/28/22 0315 12/28/22 0813   BP: (!) 110/90 92/72 (!) 124/90 125/81   Pulse: (!) 103 95 94 81   Resp: 18 16 18 19   Temp: 98.9 °F (37.2 °C) 98.8 °F (37.1 °C) 98.2 °F (36.8 °C) 98 °F (36.7 °C)   TempSrc: Oral Oral Oral Oral   SpO2: 97% 95% 90% 96%   Weight:       Height:         Intake and Output:   12/26 1901 - 12/28 0700  In: 600 [P.O.:100]  Out: 1500   No intake/output data recorded. Physical Exam:   Constitutional:  the patient is well developed and in no acute distress, alert and oriented   HEENT:  Sclera clear, pupils equal, oral mucosa moist  Lungs: clear bilaterally   Cardiovascular:  Regular rate, S1, S2, no Rub   Abd/GI: soft and non-tender; with positive bowel sounds. Ext: warm without cyanosis. There is no lower leg edema. Skin:  no jaundice or rashes  Neuro: no gross neuro deficits   Psychiatric: Calm. Access: LUE AVF cannulated      LAB  Recent Labs     12/25/22  1155 12/26/22  0609 12/27/22  0705 12/28/22  0758   WBC 11.5* 7.6 13.2* 8.4   HGB 9.8* 9.2* 9.9* 8.4*   HCT 29.6* 28.1* 29.7* 25.9*    187 242 210       Recent Labs     12/25/22  1155 12/26/22  0609 12/27/22  0705 12/28/22  0758    136 137 139   K 3.7 3.7 4.4 4.2   CL 97* 100* 98* 101   CO2 29 28 27 32   BUN 49* 51* 57* 46*   CREATININE 15.40* 14.60* 16.30* 13.80*   MG 1.5* 1.9  --   --        No results for input(s): PH, PCO2, PO2, HCO3 in the last 72 hours. Assessment/Plan:  (Medical Decision Making)   ESRD was on PD, transitioning to HD -PD catheter was removed per gen surgery 12/26.   -suspected peritonitis on Abx, Cx pending   -Ordered HD for tomorrow      2.  Umbilical hernia herniated loops of bowel, questionable seroma versus abscess  -s/p PD catheter d/c 12/26     3. Anemia  Trending hgb s/p surgery     4. MBD  On Sensipar     5.   Hypertension- controlled   On home medication      pt he is very interested in HHD but would need to talk with his sister for possible care partner, in the mean time pt will need outpt HD slot, consulted [facility] for slot at [facility]- clinic closest to his home  Paty Forrester MD, MPH   [facility]

## 2024-07-30 NOTE — PROGRESS NOTES
07/30/24 0759   Team Meeting   Meeting Type Daily Rounds   Team Members Present   Team Members Present Physician;Nurse;;Other (Discipline and Name)   Physician Team Member Thomas, Holter   Nursing Team Member Claudia   Social Work Team Member Jose J ORTEGA   Other (Discipline and Name) Jeremias MS   Patient/Family Present   Patient Present No   Patient's Family Present No     Groups Participation  5/9.   Patient's compliant with medications. D/C to home with family once ECT's complete. Next  ECT on 8/2. He's pleasant and cooperative. Appropriate interactions with female peers.

## 2024-07-30 NOTE — PROGRESS NOTES
Psychiatry Progress Note [facility]    Alberto Berumen 28 y.o. male MRN: [id number]  Unit/Bed#: -02 Encounter: [id number]  Code Status: Level 1 - Full Code    PCP: Joce Juan MD    Date of Admission:  3/29/2024 2008   Date of Service:  07/30/24    Patient Active Problem List   Diagnosis    GERD (gastroesophageal reflux disease)    Medical clearance for psychiatric admission    Schizoaffective disorder, bipolar type (HCC)    Tobacco abuse    T wave inversion in EKG    Syringoma    Chronic idiopathic constipation    Vitamin B 12 deficiency    Vitamin D deficiency    Confluent and reticulate papillomatosis    Class 2 obesity in adult    Primary hypertension    Elevated hemoglobin A1c    Bilateral lower extremity edema         Review of systems: He uses lidocaine swish as needed after allegedly biting his tongue during ECT otherwise unremarkable. Continues to improve with his behaviors and not getting too close to select peers.   Psychiatric Diagnosis: Schizoaffective bipolar    Assessment  Overall Status: He reports the same threatening voices but states he has been able to ignore them.   Certification Statement: The patient will continue to require additional inpatient hospital stay due to ongoing threatening voices with lack of strong response from medications and ECT so far       Medications:   - Clozapine 500mg PO at bedtime  - Propranolol 10mg PO every 12 hours as needed for anxiety  - Abilify 15mg PO at bedtime  - Lexapro 20mg PO daily  - Atropine eyedrops sublingual for drooling of saliva  - Senna 2 tablets twice a day for constipation  Side effects from treatment: none  Medication changes   none   Medication education   Risks side effects benefits and precautions of medications discussed with patient and he did verbalize an understanding about risks for metabolic syndrome from being on neuroleptics and is form tardive dyskinesia etc.  All medications reviewed and I recommend  that they be continued for symptom management   Understanding of medications: some   Justification for dual anti-psychotics: n/a    Non-pharmacological treatments  Continue with individual, group, milieu and occupational therapy using recovery principles and psycho-education about accepting illness and the need for treatment.  Continue ECT q2 weekly for maintenance x 6, patient has 4 more treatments left  Refused to see dietician, agreeable to more exercises for obesity  Plan to explore ACT team with discussion with aunt  Diane was replaced with Abilify due to previously high prolactin level    Safety  Safety and communication plan established to target dynamic risk factors discussed above.    Discharge Plan   Plan is to discharge back to his aunt with ACT team once ECT is completed    Interval Progress   He went to 5/9 groups yesterday. He had no behavioral issues and needed no behavioral PRNs in the past 24 hours. He has been pleasant and cooperative. He rates his depression as a 6/10 and anxiety as a 3/10. He denies suicidal or homicidal ideations. He reports the same threatening voices that tell him they are going to kill him and his family but he states they are improved with ECT treatments. He had a good visit with his aunts over this past weekend.   Acceptance by patient: accepting  Hopefulness in recovery: living with aunt and sister  Involved in reintegration process: aunt, sister  Trusting in relationship with psychiatrist: appears to be more trusting  Sleep: good  Appetite: good  Compliance with Medications: compliant  Group attendance: 5/9 yesterday  Significant events: He states the voices are still present stating the same things but are more manageable after ECT treatments. He has been social and pleasant. He has been improving with behaviors with select peers and not getting as close to them. His next ECT is 8/2 and he has 4 more treatments left.       Mental Status Exam  Appearance: marginal  hygiene, wearing hospital clothes, overweight, dreadlocked hair  Behavior: cooperative, calm, slow responses  Speech: normal volume, decreased rate, slow  Mood: depressed, dysphoric, anxious  Affect: constricted, mood-congruent  Thought Process: organized, goal directed, decreased rate of thoughts  Thought Content: paranoid ideation, intrusive thoughts, preoccupied, paranoia is due to voices he hears that are threatening to kill him and his family which makes him feel depressed, he denies suicidal or homicidal ideations, denies phobias, obsessions, compulsions, distorted body perceptions  Perceptual Disturbances: no visual hallucinations, does not appear responding to internal stimuli, auditory hallucinations, chronic, no commands, auditory hallucinations of threatening voices that tell him they are going to kill him and his family  Hx Risk Factors: chronic psychiatric problems, chronic anxiety symptoms, chronic psychotic symptoms  Sensorium: alert  Cognition: recent and remote memory grossly intact  Consciousness: alert and awake  Attention: attention span and concentration are age appropriate  Intellect: appears to be of average intelligence  Insight: intact  Judgement: intact  Motor Activity: no abnormal movements     Vitals  Temp:  [97.5 °F (36.4 °C)] 97.5 °F (36.4 °C)  HR:  [102-104] 104  Resp:  [18] 18  BP: (124-140)/(77-90) 124/90  SpO2:  [98 %] 98 %  No intake or output data in the 24 hours ending 07/30/24 0818    Lab Results: All Labs Within Last 24 Hours Reviewed  Results from last 7 days   Lab Units 07/25/24  2043   WBC Thousand/uL 12.69*   RBC Million/uL 5.71*   HEMOGLOBIN g/dL 13.6   HEMATOCRIT % 45.3   MCV fL 79*   PLATELETS Thousands/uL 303   TOTAL NEUT ABS Thousands/µL 7.28   CLOZAPINE LVL ng/mL 430       Current Facility-Administered Medications   Medication Dose Route Frequency Provider Last Rate    acetaminophen  650 mg Oral Q4H PRN Jordan C Holter,       acetaminophen  650 mg Oral Q6H PRN Eveline  HOLLI Hunt      aluminum-magnesium hydroxide-simethicone  30 mL Oral Q4H PRN Jordan C Holter, DO      amLODIPine  5 mg Oral Daily HOLLI Lion      ARIPiprazole  15 mg Oral Daily Bora Rosario MD      Artificial Tears  1 drop Both Eyes Q3H PRN Jordan C Holter, DO      atropine  1 drop Sublingual Daily PRN HOLLI Lion      haloperidol lactate  2.5 mg Intramuscular Q4H PRN Max 4/day STEVE LionNP      And    LORazepam  1 mg Intramuscular Q4H PRN Max 4/day STEVE LionNP      And    benztropine  0.5 mg Intramuscular Q4H PRN Max 4/day STEVE LionNP      benztropine  1 mg Intramuscular Q4H PRN Max 6/day Jordan C Holter, DO      haloperidol lactate  5 mg Intramuscular Q4H PRN Max 4/day HOLLI Lion      And    LORazepam  2 mg Intramuscular Q4H PRN Max 4/day HOLLI Lion      And    benztropine  1 mg Intramuscular Q4H PRN Max 4/day HOLLI Lion      benztropine  1 mg Oral Q4H PRN Max 6/day HOLLI Lion      benztropine  1 mg Oral Q4H PRN Max 6/day Jordan C Holter, DO      bisacodyl  10 mg Rectal Daily PRN HOLLI Loin      calcium carbonate  500 mg Oral BID PRN HOLLI Monge      cloZAPine  500 mg Oral HS Bora Rosario MD      cyanocobalamin  1,000 mcg Oral Daily HOLLI Galvan      hydrOXYzine HCL  50 mg Oral Q6H PRN Max 4/day Jordan C Holter, DO      Or    diphenhydrAMINE  50 mg Intramuscular Q6H PRN Jordan C Holter, DO      hydrOXYzine HCL  50 mg Oral Q6H PRN Max 4/day HOLLI Lion      Or    diphenhydrAMINE  50 mg Intramuscular Q6H PRN HOLLI Lion      diphenhydrAMINE-zinc acetate   Topical BID PRN HOLLI Lion      escitalopram  20 mg Oral Daily HOLLI Lion      famotidine  20 mg Oral BID HOLLI Monge      fluticasone  1 spray Each Nare Daily Brina Guillen MD      glycopyrrolate  1 mg Oral TID Bora Rosario MD      haloperidol  1 mg Oral Q6H PRN HOLLI Lion      haloperidol  2.5 mg Oral  Q4H PRN Max 4/day HOLLI Lion      haloperidol  5 mg Oral Q4H PRN Max 4/day HOLLI Lion      hydroCHLOROthiazide  12.5 mg Oral Daily HOLLI Galvan      hydrocortisone   Topical 4x Daily PRN HOLLI Lion      hydrOXYzine HCL  100 mg Oral Q6H PRN Max 4/day [facility] Holter, DO      Or    LORazepam  2 mg Intramuscular Q6H PRN [facility] Holter, DO      hydrOXYzine HCL  100 mg Oral Q6H PRN Max 4/day HOLLI Lion      Or    LORazepam  2 mg Intramuscular Q6H PRN HOLLI Lion      hydrOXYzine HCL  25 mg Oral Q6H PRN Max 4/day [facility] Holter, DO      ibuprofen  600 mg Oral Q8H PRN HOLLI Lion      Lidocaine Viscous HCl  15 mL Swish & Spit 4x Daily PRN HOLLI Galvan      loratadine  10 mg Oral Daily Brina Guillen MD      melatonin  3 mg Oral HS PRN Bora Rosario MD      metFORMIN  500 mg Oral BID With Meals HOLLI Galvan      methocarbamol  500 mg Oral Q6H PRN HOLLI Lion      nicotine polacrilex  2 mg Oral Q4H PRN Bora Rosario MD      OLANZapine  5 mg Oral Q4H PRN Max 3/day [facility] Holter, DO      Or    OLANZapine  2.5 mg Intramuscular Q4H PRN Max 3/day [facility] Holter, DO      OLANZapine  5 mg Oral Q3H PRN Max 3/day [facility] Holter, DO      Or    OLANZapine  5 mg Intramuscular Q3H PRN Max 3/day [facility] Holter, DO      OLANZapine  2.5 mg Oral Q4H PRN Max 6/day [facility] Holter, DO      ondansetron  4 mg Oral Q6H PRN HOLLI Lion      pantoprazole  20 mg Oral Early Morning HOLLI Monge      polyethylene glycol  17 g Oral Daily HOLLI Loin      polyethylene glycol  17 g Oral Daily PRN [facility] Holter, DO      propranolol  10 mg Oral Q12H KAYLIE HOLLI Lion      senna-docusate sodium  1 tablet Oral Daily PRN [facility] Holter, DO      senna-docusate sodium  2 tablet Oral BID HOLLI Lion      traZODone  50 mg Oral HS PRN HOLLI Lion      white petrolatum-mineral oil   Topical TID PRN HOLLI Lion          Counseling / Coordination of Care: Total floor / unit time spent today 15 minutes. Greater than 50% of total time was spent with the patient and / or family counseling and / or somewhat receptive to supportive listening and teaching positive coping skills to deal with symptom mangement.     Patient's Rights, confidentiality and exceptions to confidentiality, use of automated medical record, Behavioral Health Services staff access to medical record, and consent to treatment reviewed.    This note has been dictated and hence there may be problems with punctuation, spelling and formatting and if anyone has any concerns please address them to Dr. Rosario   This note is not shared with patient due to potential for making patient's condition worse by knowing the content of the note.

## 2024-07-30 NOTE — NURSING NOTE
"Patient is visible and social on the unit. He is cooperative with medications. He continues to hear voices. He was irritable at times today. When given water with his miralax this am, he commented smartly \"what's this?\" Referring that writer mixed it with water instead of juice. During dinner med pass he stated  \"where's my gum?\" Writer was not informed he wanted gum. He did not ask. He was irritated writer didn't automatically bring it to him.  Appetite was good. He attended groups. No behavior issues.   "

## 2024-07-30 NOTE — PLAN OF CARE
Problem: Alteration in Thoughts and Perception  Goal: Treatment Goal: Gain control of psychotic behaviors/thinking, reduce/eliminate presenting symptoms and demonstrate improved reality functioning upon discharge  7/30/2024 0304 by Mireya Pearson RN  Outcome: Progressing  7/30/2024 0302 by Mireya Pearson RN  Outcome: Progressing  Goal: Verbalize thoughts and feelings  Description: Interventions:  - Promote a nonjudgmental and trusting relationship with the patient through active listening and therapeutic communication  - Assess patient's level of functioning, behavior and potential for risk  - Engage patient in 1 on 1 interactions  - Encourage patient to express fears, feelings, frustrations, and discuss symptoms    - [place] patient to reality, help patient recognize reality-based thinking   - Administer medications as ordered and assess for potential side effects  - Provide the patient education related to the signs and symptoms of the illness and desired effects of prescribed medications  7/30/2024 0304 by Mireya Pearson RN  Outcome: Progressing  7/30/2024 0302 by Mireya Pearson RN  Outcome: Progressing  Goal: Refrain from acting on delusional thinking/internal stimuli  Description: Interventions:  - Monitor patient closely, per order   - Utilize least restrictive measures   - Set reasonable limits, give positive feedback for acceptable   - Administer medications as ordered and monitor of potential side effects  7/30/2024 0304 by Mireya Pearson RN  Outcome: Progressing  7/30/2024 0302 by Mireya Pearson RN  Outcome: Progressing  Goal: Agree to be compliant with medication regime, as prescribed and report medication side effects  Description: Interventions:  - Offer appropriate PRN medication and supervise ingestion; conduct AIMS, as needed   7/30/2024 0304 by Mireya Pearson RN  Outcome: Progressing  7/30/2024 0302 by Mireya Pearson RN  Outcome: Progressing  Goal: Attend and participate in  unit activities, including therapeutic, recreational, and educational groups  Description: Interventions:  -Encourage Visitation and family involvement in care  Outcome: Progressing  Goal: Recognize dysfunctional thoughts, communicate reality-based thoughts at the time of discharge  Description: Interventions:  - Provide medication and psycho-education to assist patient in compliance and developing insight into his/her illness   7/30/2024 0304 by Mireya Pearson RN  Outcome: Progressing  7/30/2024 0302 by Mireya Pearson RN  Outcome: Progressing  Goal: Complete daily ADLs, including personal hygiene independently, as able  Description: Interventions:  - Observe, teach, and assist patient with ADLS  - Monitor and promote a balance of rest/activity, with adequate nutrition and elimination   7/30/2024 0304 by Mireya Pearson RN  Outcome: Progressing  7/30/2024 0302 by Mireya Pearson RN  Outcome: Progressing     Problem: Ineffective Coping  Goal: Identifies healthy coping skills  Outcome: Progressing  Goal: Demonstrates healthy coping skills  Outcome: Progressing  Goal: Participates in unit activities  Description: Interventions:  - Provide therapeutic environment   - Provide required programming   - Redirect inappropriate behaviors   Outcome: Progressing  Goal: Patient/Family verbalizes awareness of resources  Outcome: Progressing     Problem: Depression  Goal: Treatment Goal: Demonstrate behavioral control of depressive symptoms, verbalize feelings of improved mood/affect, and adopt new coping skills prior to discharge  7/30/2024 0304 by Mireya Pearson RN  Outcome: Progressing  7/30/2024 0304 by Mireya Perason RN  Outcome: Progressing  Goal: Verbalize thoughts and feelings  Description: Interventions:  - Assess and re-assess patient's level of risk   - Engage patient in 1:1 interactions, daily, for a minimum of 15 minutes   - Encourage patient to express feelings, fears, frustrations, hopes   Outcome:  Progressing  Goal: Refrain from harming self  Description: Interventions:  - Monitor patient closely, per order   - Supervise medication ingestion, monitor effects and side effects   7/30/2024 0304 by Mireya Pearson RN  Outcome: Progressing  7/30/2024 0304 by Mireya Pearson RN  Outcome: Progressing  Goal: Refrain from isolation  Description: Interventions:  - Develop a trusting relationship   - Encourage socialization   7/30/2024 0304 by Mireya Pearson RN  Outcome: Progressing  7/30/2024 0304 by Mireya Pearson RN  Outcome: Progressing  Goal: Refrain from self-neglect  7/30/2024 0304 by Mireya Pearson RN  Outcome: Progressing  7/30/2024 0304 by Mireya Pearson RN  Outcome: Progressing  Goal: Attend and participate in unit activities, including therapeutic, recreational, and educational groups  Description: Interventions:  - Provide therapeutic and educational activities daily, encourage attendance and participation, and document same in the medical record   Outcome: Progressing  Goal: Complete daily ADLs, including personal hygiene independently, as able  Description: Interventions:  - Observe, teach, and assist patient with ADLS  -  Monitor and promote a balance of rest/activity, with adequate nutrition and elimination   7/30/2024 0304 by Mireya Pearson RN  Outcome: Progressing  7/30/2024 0304 by Mireya Pearson RN  Outcome: Progressing     Problem: Anxiety  Goal: Anxiety is at manageable level  Description: Interventions:  - Assess and monitor patient's anxiety level.   - Monitor for signs and symptoms (heart palpitations, chest pain, shortness of breath, headaches, nausea, feeling jumpy, restlessness, irritable, apprehensive).   - Collaborate with interdisciplinary team and initiate plan and interventions as ordered.  - [place] patient to unit/surroundings  - Explain treatment plan  - Encourage participation in care  - Encourage verbalization of concerns/fears  - Identify coping mechanisms  -  Assist in developing anxiety-reducing skills  - Administer/offer alternative therapies  - Limit or eliminate stimulants  Outcome: Progressing     Problem: Alteration in Orientation  Goal: Treatment Goal: Demonstrate a reduction of confusion and improved orientation to person, place, time and/or situation upon discharge, according to optimum baseline/ability  Outcome: Progressing  Goal: Allow medical examinations, as recommended  Description: Interventions:  - Provide physical/neurological exams and/or referrals, per provider   Outcome: Progressing  Goal: Cooperate with recommended testing/procedures  Description: Interventions:  - Determine need for ancillary testing  - Observe for mental status changes  - Implement falls/precaution protocol   Outcome: Progressing  Goal: Complete daily ADLs, including personal hygiene independently, as able  Description: Interventions:  - Observe, teach, and assist patient with ADLS  Outcome: Progressing     Problem: Electroconvulsive therapy (ECT)  Goal: Treatment Goal: Demonstrate a reduction of confusion and improved orientation to person, place, time and/or situation upon discharge, according to optimum baseline/ability  Outcome: Progressing

## 2024-07-31 PROCEDURE — 99232 SBSQ HOSP IP/OBS MODERATE 35: CPT | Performed by: PSYCHIATRY & NEUROLOGY

## 2024-07-31 RX ADMIN — NICOTINE POLACRILEX 2 MG: 2 GUM, CHEWING ORAL at 21:22

## 2024-07-31 RX ADMIN — POLYETHYLENE GLYCOL 3350 17 G: 17 POWDER, FOR SOLUTION ORAL at 08:32

## 2024-07-31 RX ADMIN — METFORMIN HYDROCHLORIDE 500 MG: 500 TABLET, FILM COATED ORAL at 17:18

## 2024-07-31 RX ADMIN — GLYCOPYRROLATE 1 MG: 1 TABLET ORAL at 17:18

## 2024-07-31 RX ADMIN — PROPRANOLOL HYDROCHLORIDE 10 MG: 10 TABLET ORAL at 21:22

## 2024-07-31 RX ADMIN — ESCITALOPRAM OXALATE 20 MG: 10 TABLET, FILM COATED ORAL at 08:33

## 2024-07-31 RX ADMIN — FAMOTIDINE 20 MG: 20 TABLET ORAL at 17:17

## 2024-07-31 RX ADMIN — PANTOPRAZOLE SODIUM 20 MG: 20 TABLET, DELAYED RELEASE ORAL at 05:45

## 2024-07-31 RX ADMIN — LORATADINE 10 MG: 10 TABLET ORAL at 08:33

## 2024-07-31 RX ADMIN — ARIPIPRAZOLE 15 MG: 15 TABLET ORAL at 08:33

## 2024-07-31 RX ADMIN — FAMOTIDINE 20 MG: 20 TABLET ORAL at 08:33

## 2024-07-31 RX ADMIN — CLOZAPINE 500 MG: 100 TABLET ORAL at 21:22

## 2024-07-31 RX ADMIN — CYANOCOBALAMIN TAB 1000 MCG 1000 MCG: 1000 TAB at 08:33

## 2024-07-31 RX ADMIN — NICOTINE POLACRILEX 2 MG: 2 GUM, CHEWING ORAL at 12:36

## 2024-07-31 RX ADMIN — SENNOSIDES AND DOCUSATE SODIUM 2 TABLET: 8.6; 5 TABLET ORAL at 17:18

## 2024-07-31 RX ADMIN — NICOTINE POLACRILEX 2 MG: 2 GUM, CHEWING ORAL at 17:21

## 2024-07-31 RX ADMIN — GLYCOPYRROLATE 1 MG: 1 TABLET ORAL at 08:33

## 2024-07-31 RX ADMIN — GLYCOPYRROLATE 1 MG: 1 TABLET ORAL at 21:23

## 2024-07-31 RX ADMIN — METFORMIN HYDROCHLORIDE 500 MG: 500 TABLET, FILM COATED ORAL at 08:33

## 2024-07-31 RX ADMIN — SENNOSIDES AND DOCUSATE SODIUM 2 TABLET: 8.6; 5 TABLET ORAL at 08:32

## 2024-07-31 NOTE — SOCIAL WORK
This writer completed patient check in with patient. Patient requested a visit with his sister, however he has to discuss further with her. Patient intends to call his sister today to discuss an in person visit. Patient denies having other concerns or questions.

## 2024-07-31 NOTE — PLAN OF CARE
Problem: Alteration in Thoughts and Perception  Goal: Treatment Goal: Gain control of psychotic behaviors/thinking, reduce/eliminate presenting symptoms and demonstrate improved reality functioning upon discharge  Outcome: Progressing  Goal: Verbalize thoughts and feelings  Description: Interventions:  - Promote a nonjudgmental and trusting relationship with the patient through active listening and therapeutic communication  - Assess patient's level of functioning, behavior and potential for risk  - Engage patient in 1 on 1 interactions  - Encourage patient to express fears, feelings, frustrations, and discuss symptoms    - [place] patient to reality, help patient recognize reality-based thinking   - Administer medications as ordered and assess for potential side effects  - Provide the patient education related to the signs and symptoms of the illness and desired effects of prescribed medications  Outcome: Progressing  Goal: Refrain from acting on delusional thinking/internal stimuli  Description: Interventions:  - Monitor patient closely, per order   - Utilize least restrictive measures   - Set reasonable limits, give positive feedback for acceptable   - Administer medications as ordered and monitor of potential side effects  Outcome: Progressing  Goal: Agree to be compliant with medication regime, as prescribed and report medication side effects  Description: Interventions:  - Offer appropriate PRN medication and supervise ingestion; conduct AIMS, as needed   Outcome: Progressing     Problem: Ineffective Coping  Goal: Identifies ineffective coping skills  Outcome: Progressing  Goal: Identifies healthy coping skills  Outcome: Progressing  Goal: Demonstrates healthy coping skills  Outcome: Progressing     Problem: Ineffective Coping  Goal: Identifies ineffective coping skills  Outcome: Progressing  Goal: Identifies healthy coping skills  Outcome: Progressing  Goal: Demonstrates healthy coping skills  Outcome:  Progressing

## 2024-07-31 NOTE — NURSING NOTE
Patient slept throughout the night for more than 7 hours with no outward signs of distress. Respirations even and unlabored. Safety checks maintained.

## 2024-07-31 NOTE — PROGRESS NOTES
07/31/24 0806   Team Meeting   Meeting Type Daily Rounds   Team Members Present   Team Members Present Physician;Nurse;;Other (Discipline and Name)   Physician Team Member Holter   Nursing Team Member Claudia   Social Work Team Member Jose J ORTEGA   Other (Discipline and Name) Jeremias MS   Patient/Family Present   Patient Present No   Patient's Family Present No     Groups Participation  10/11.   Patient's compliant with medications. D/C home once ECT treatments complete. He socializes with select peers.

## 2024-07-31 NOTE — PROGRESS NOTES
Inpatient Behavioral Health Safety/Relapse Prevention Plan with the following results:    My strengths and things worth living for include:Family, friends, love    Triggers, stressors, and situations that place me at risk for a crisis situation are:  [x]Things people say to me  []Things people do  [x]Losses  [x]My own negative thoughts  [x]My own behaviors  [x]Being alone  [x]Loneliness   []Preoccupation with the past  []Perceived failure  []Sleep problems  []Work or School  [x]Things I am worried about  [x]Substance abuse  [x]Financial problems  []Physical problems    Warning Signs (thoughts, image, feelings and behaviors) that indicate I may need help: Thoughts, feelings, behaviors    Coping Skills, I can use/things that help me calm down and keep me safe  [x]Taking my medication appropriately  [x]Deep Breathing  [x]Exercise  [x]Journaling  []Keeping my mental health appointments  []Talking with supports  []Crafts/Arts  []Reading  [x]Taking a walk break  [x]Helping others  [x]Music  [x]Spirituality  []Volunteering   [x]Mindfulness   []Keeping a daily schedule for structure/purpose  [x]Hot shower or bath   []Support groups    Family, friends and organizations I can call for support: Sister, brother, aunt

## 2024-07-31 NOTE — NURSING NOTE
Addendum  created 10/31/19 1347 by Jyoti Martinez MD    Order list changed       Pt is present on the milieu and social with select peers. Pt is polite and cooperative. Pt continues to endorse the same AH. Attended groups. Took HS medications without incidence. No behavioral issues.

## 2024-07-31 NOTE — NURSING NOTE
Patient had to be woken up for lunch. He is cooperative. He interacts with select peers and staff. Circumstantial during conversations. Maintains good eye contact. Patient is malodorous. Attended  two groups. Ate 100 fro lunch and dinner.

## 2024-07-31 NOTE — PLAN OF CARE
Problem: Ineffective Coping  Goal: Identifies ineffective coping skills  Outcome: Progressing  Goal: Identifies healthy coping skills  Outcome: Progressing  Goal: Demonstrates healthy coping skills  Outcome: Progressing  Goal: Participates in unit activities  Description: Interventions:  - Provide therapeutic environment   - Provide required programming   - Redirect inappropriate behaviors   Outcome: Progressing   Attended 15/20 of the groups offered during the past 2 days.

## 2024-07-31 NOTE — PROGRESS NOTES
Psychiatric Progress Note - Department of Behavioral Health   Alberto Berumen 28 y.o. male MRN: [id number]  Unit/Bed#: [facility] 108-02 Encounter: [id number]    ASSESSMENT & PLAN     Diagnoses:   Principal Problem:    Schizoaffective disorder, bipolar type (HCC)  Active Problems:    GERD (gastroesophageal reflux disease)    Medical clearance for psychiatric admission    Tobacco abuse    T wave inversion in EKG    Chronic idiopathic constipation    Confluent and reticulate papillomatosis    Primary hypertension    Elevated hemoglobin A1c    Bilateral lower extremity edema      Treatment Recommendations/Precautions:  Continue to promote patient participation in therapeutic milieu.  Continue medical management per medicine.  Continue previously prescribed psychotropic medication regimen; see below.  Continue behavioral health checks q.7 minutes.   Continue vitals per behavioral health unit protocol.  Discharge date per primary team; 201 commitment status.    SUBJECTIVE     Patient evaluated this a.m. for continuity of care. Patient was discussed at length with nursing and treatment team. Per nursing, patient remains calm, cooperative, present and appropriately interactive in the milieu, adherent to his medications without any acute adverse effects. No acute distress is noted throughout evaluation. Alberto Berumen denies suicidal/homicidal ideation in addition to thoughts of self-injury, receptive to crisis planning provided by this writer, contacting for safety in the inpatient setting, admitting to an ability to appropriately confide in staff including this writer.  Patient denies any/all psychiatric complaints/concerns aside from intermittent instances of auditory hallucinations that are negative and derogatory in content, agreeable to continuation of electroconvulsive therapy secondary to refractory psychosis, somewhat scant and sparse in interaction involving this writer    PSYCHIATRIC REVIEW OF SYSTEMS      Behavior over the last 24 hours:  unchanged  Sleep: adequate  Appetite: adequate  Medication side effects: No    REVIEW OF SYSTEMS   Review of systems: no complaints    OBJECTIVE     Vital Signs in Past 24 Hours:  Temp:  [97.5 °F (36.4 °C)-97.7 °F (36.5 °C)] 97.5 °F (36.4 °C)  HR:  [] 79  Resp:  [16-18] 16  BP: (109-147)/(70-89) 109/70    Intake/Output in Past 24 hours:  No intake/output data recorded.  No intake/output data recorded.        Laboratory Results:  I have personally reviewed all pertinent laboratory/tests results.  Most Recent Labs:   Lab Results   Component Value Date    WBC 12.69 (H) 07/25/2024    RBC 5.71 (H) 07/25/2024    HGB 13.6 07/25/2024    HCT 45.3 07/25/2024     07/25/2024    RDW 13.8 07/25/2024    NEUTROABS 7.28 07/25/2024    SODIUM 136 06/03/2024    K 3.8 06/03/2024    CL 96 06/03/2024    CO2 29 06/03/2024    BUN 10 06/03/2024    CREATININE 1.00 06/03/2024    GLUC 97 06/03/2024    GLUF 103 (H) 05/29/2024    CALCIUM 10.1 06/03/2024    AST 33 06/03/2024    ALT 72 (H) 06/03/2024    ALKPHOS 80 06/03/2024    TP 8.5 (H) 06/03/2024    ALB 4.7 06/03/2024    TBILI 0.30 06/03/2024    CHOLESTEROL 156 03/14/2024    HDL 44 03/14/2024    TRIG 133 03/14/2024    LDLCALC 85 03/14/2024    NONHDLC 112 03/14/2024    LITHIUM 0.61 01/09/2024    [id number] 1.062 11/15/2023    HGBA1C 5.0 04/01/2024    EAG 97 04/01/2024       Behavioral Health Medications: all current active meds have been reviewed, continue current psychiatric medications, and current meds:   Current Facility-Administered Medications   Medication Dose Route Frequency    acetaminophen (TYLENOL) tablet 650 mg  650 mg Oral Q4H PRN    acetaminophen (TYLENOL) tablet 650 mg  650 mg Oral Q6H PRN    aluminum-magnesium hydroxide-simethicone (MAALOX) oral suspension 30 mL  30 mL Oral Q4H PRN    amLODIPine (NORVASC) tablet 5 mg  5 mg Oral Daily    ARIPiprazole (ABILIFY) tablet 15 mg  15 mg Oral Daily    Artificial Tears ophthalmic solution  1 drop  1 drop Both Eyes Q3H PRN    atropine (ISOPTO ATROPINE) 1 % ophthalmic solution 1 drop  1 drop Sublingual Daily PRN    haloperidol lactate (HALDOL) injection 2.5 mg  2.5 mg Intramuscular Q4H PRN Max 4/day    And    LORazepam (ATIVAN) injection 1 mg  1 mg Intramuscular Q4H PRN Max 4/day    And    benztropine (COGENTIN) injection 0.5 mg  0.5 mg Intramuscular Q4H PRN Max 4/day    benztropine (COGENTIN) injection 1 mg  1 mg Intramuscular Q4H PRN Max 6/day    haloperidol lactate (HALDOL) injection 5 mg  5 mg Intramuscular Q4H PRN Max 4/day    And    LORazepam (ATIVAN) injection 2 mg  2 mg Intramuscular Q4H PRN Max 4/day    And    benztropine (COGENTIN) injection 1 mg  1 mg Intramuscular Q4H PRN Max 4/day    benztropine (COGENTIN) tablet 1 mg  1 mg Oral Q4H PRN Max 6/day    benztropine (COGENTIN) tablet 1 mg  1 mg Oral Q4H PRN Max 6/day    bisacodyl (DULCOLAX) rectal suppository 10 mg  10 mg Rectal Daily PRN    calcium carbonate (TUMS) chewable tablet 500 mg  500 mg Oral BID PRN    cloZAPine (CLOZARIL) tablet 500 mg  500 mg Oral HS    cyanocobalamin (VITAMIN B-12) tablet 1,000 mcg  1,000 mcg Oral Daily    hydrOXYzine HCL (ATARAX) tablet 50 mg  50 mg Oral Q6H PRN Max 4/day    Or    diphenhydrAMINE (BENADRYL) injection 50 mg  50 mg Intramuscular Q6H PRN    hydrOXYzine HCL (ATARAX) tablet 50 mg  50 mg Oral Q6H PRN Max 4/day    Or    diphenhydrAMINE (BENADRYL) injection 50 mg  50 mg Intramuscular Q6H PRN    diphenhydrAMINE-zinc acetate (BENADRYL) 2-0.1 % cream   Topical BID PRN    escitalopram (LEXAPRO) tablet 20 mg  20 mg Oral Daily    famotidine (PEPCID) tablet 20 mg  20 mg Oral BID    fluticasone (FLONASE) 50 mcg/act nasal spray 1 spray  1 spray Each Nare Daily    glycopyrrolate (ROBINUL) tablet 1 mg  1 mg Oral TID    haloperidol (HALDOL) tablet 1 mg  1 mg Oral Q6H PRN    haloperidol (HALDOL) tablet 2.5 mg  2.5 mg Oral Q4H PRN Max 4/day    haloperidol (HALDOL) tablet 5 mg  5 mg Oral Q4H PRN Max 4/day     hydroCHLOROthiazide tablet 12.5 mg  12.5 mg Oral Daily    hydrocortisone 1 % ointment   Topical 4x Daily PRN    hydrOXYzine HCL (ATARAX) tablet 100 mg  100 mg Oral Q6H PRN Max 4/day    Or    LORazepam (ATIVAN) injection 2 mg  2 mg Intramuscular Q6H PRN    hydrOXYzine HCL (ATARAX) tablet 100 mg  100 mg Oral Q6H PRN Max 4/day    Or    LORazepam (ATIVAN) injection 2 mg  2 mg Intramuscular Q6H PRN    hydrOXYzine HCL (ATARAX) tablet 25 mg  25 mg Oral Q6H PRN Max 4/day    ibuprofen (MOTRIN) tablet 600 mg  600 mg Oral Q8H PRN    Lidocaine Viscous HCl (XYLOCAINE) 2 % mucosal solution 15 mL  15 mL Swish & Spit 4x Daily PRN    loratadine (CLARITIN) tablet 10 mg  10 mg Oral Daily    melatonin tablet 3 mg  3 mg Oral HS PRN    metFORMIN (GLUCOPHAGE) tablet 500 mg  500 mg Oral BID With Meals    methocarbamol (ROBAXIN) tablet 500 mg  500 mg Oral Q6H PRN    nicotine polacrilex (NICORETTE) gum 2 mg  2 mg Oral Q4H PRN    OLANZapine (ZyPREXA) tablet 5 mg  5 mg Oral Q4H PRN Max 3/day    Or    OLANZapine (ZyPREXA) IM injection 2.5 mg  2.5 mg Intramuscular Q4H PRN Max 3/day    OLANZapine (ZyPREXA) tablet 5 mg  5 mg Oral Q3H PRN Max 3/day    Or    OLANZapine (ZyPREXA) IM injection 5 mg  5 mg Intramuscular Q3H PRN Max 3/day    OLANZapine (ZyPREXA) tablet 2.5 mg  2.5 mg Oral Q4H PRN Max 6/day    ondansetron (ZOFRAN-ODT) dispersible tablet 4 mg  4 mg Oral Q6H PRN    pantoprazole (PROTONIX) EC tablet 20 mg  20 mg Oral Early Morning    polyethylene glycol (MIRALAX) packet 17 g  17 g Oral Daily    polyethylene glycol (MIRALAX) packet 17 g  17 g Oral Daily PRN    propranolol (INDERAL) tablet 10 mg  10 mg Oral Q12H KAYLIE    senna-docusate sodium (SENOKOT S) 8.6-50 mg per tablet 1 tablet  1 tablet Oral Daily PRN    senna-docusate sodium (SENOKOT S) 8.6-50 mg per tablet 2 tablet  2 tablet Oral BID    traZODone (DESYREL) tablet 50 mg  50 mg Oral HS PRN    white petrolatum-mineral oil (EUCERIN,HYDROCERIN) cream   Topical TID PRN   .    Risks, benefits  "and possible side effects of Medications:   Risks, benefits, and possible side effects of medications explained to patient and patient verbalizes understanding.      Mental Status Evaluation:  Appearance:  age appropriate, casually dressed, and disheveled   Behavior:  psychomotor retardation, somewhat superficial, suspicious   Speech:  normal pitch and normal volume, scant   Mood:  euthymic; \"OK\"   Affect:  blunted and mood-incongruent   Language sparse   Thought Process:  goal directed   Thought Content:  Appears paranoid   Perceptual Disturbances: Auditory hallucinations without commands   Risk Potential: Suicidal Ideations none, Homicidal Ideations none, and Potential for Aggression No   Sensorium:  person, place, and time/date   Cognition:  recent and remote memory grossly intact   Consciousness:  alert and awake    Attention: attention span appeared shorter than expected for age   Insight:  limited   Judgment: limited   Intellect Not assessed   Gait/Station: normal gait/station and normal balance   Motor Activity: no abnormal movements     Memory: Short and long term memory  fair     Progress Toward Goals: unchanged, as evidenced by their participation (or lack thereof) in individual, social and therapeutic milieu in addition to adherence to their medication regimen.    Recommended Treatment:   See above for assessment and plan.    Inpatient Psychiatric Certification: Based upon physical, mental and social evaluations, I certify that inpatient psychiatric services are medically necessary for this patient for a duration of greater than 2 midnights for the treatment of Schizoaffective disorder, bipolar type (HCC) including psychotropic medication management, participation in the therapeutic milieu and referrals as indicated. Available alternative community resources do not meet the patient's mental health care needs. I further attest that an established written individualized plan of care has been implemented and " is outlined in the patient's medical records.    Counseling/Coordination of Care    Total unit time spent today was greater than 15 minutes. Greater than 50% of total time was spent with the patient and/or patient's relatives and/or coordination of patient's care.     Patient's rights, confidentiality, exceptions to confidentiality, use of electronic medical record including appropriate staff access to medical record regarding behavioral health services and consent to treatment were reviewed.    Note Share:     This note was not shared with the patient due to reasonable likelihood of causing patient harm     This note has been constructed using a voice recognition system.    There may be translation, syntax, or grammatical errors. If you have any questions, please contact the dictating provider.    Jordan Christopher Holter, DO 07/31/24

## 2024-08-01 ENCOUNTER — ANESTHESIA (OUTPATIENT)
Dept: ANESTHESIOLOGY | Facility: HOSPITAL | Age: 29
End: 2024-08-01

## 2024-08-01 ENCOUNTER — ANESTHESIA EVENT (OUTPATIENT)
Dept: ANESTHESIOLOGY | Facility: HOSPITAL | Age: 29
End: 2024-08-01

## 2024-08-01 LAB
BASOPHILS # BLD AUTO: 0.08 THOUSANDS/ÂΜL (ref 0–0.1)
BASOPHILS NFR BLD AUTO: 1 % (ref 0–1)
BNP SERPL-MCNC: 19 PG/ML (ref 0–100)
CK SERPL-CCNC: 223 U/L (ref 39–308)
CLOZAPINE SERPL-MCNC: 657 NG/ML (ref 350–900)
CRP SERPL QL: 9.6 MG/L
EOSINOPHIL # BLD AUTO: 0.36 THOUSAND/ÂΜL (ref 0–0.61)
EOSINOPHIL NFR BLD AUTO: 3 % (ref 0–6)
ERYTHROCYTE [DISTWIDTH] IN BLOOD BY AUTOMATED COUNT: 13.9 % (ref 11.6–15.1)
HCT VFR BLD AUTO: 44.3 % (ref 36.5–49.3)
HGB BLD-MCNC: 13.3 G/DL (ref 12–17)
IMM GRANULOCYTES # BLD AUTO: 0.04 THOUSAND/UL (ref 0–0.2)
IMM GRANULOCYTES NFR BLD AUTO: 0 % (ref 0–2)
LYMPHOCYTES # BLD AUTO: 3.65 THOUSANDS/ÂΜL (ref 0.6–4.47)
LYMPHOCYTES NFR BLD AUTO: 32 % (ref 14–44)
MCH RBC QN AUTO: 23.8 PG (ref 26.8–34.3)
MCHC RBC AUTO-ENTMCNC: 30 G/DL (ref 31.4–37.4)
MCV RBC AUTO: 79 FL (ref 82–98)
MONOCYTES # BLD AUTO: 1.04 THOUSAND/ÂΜL (ref 0.17–1.22)
MONOCYTES NFR BLD AUTO: 9 % (ref 4–12)
NEUTROPHILS # BLD AUTO: 6.16 THOUSANDS/ÂΜL (ref 1.85–7.62)
NEUTS SEG NFR BLD AUTO: 55 % (ref 43–75)
NRBC BLD AUTO-RTO: 0 /100 WBCS
PLATELET # BLD AUTO: 199 THOUSANDS/UL (ref 149–390)
PMV BLD AUTO: 10.5 FL (ref 8.9–12.7)
RBC # BLD AUTO: 5.59 MILLION/UL (ref 3.88–5.62)
WBC # BLD AUTO: 11.33 THOUSAND/UL (ref 4.31–10.16)

## 2024-08-01 PROCEDURE — 82550 ASSAY OF CK (CPK): CPT

## 2024-08-01 PROCEDURE — 84146 ASSAY OF PROLACTIN: CPT | Performed by: PSYCHIATRY & NEUROLOGY

## 2024-08-01 PROCEDURE — 85025 COMPLETE CBC W/AUTO DIFF WBC: CPT

## 2024-08-01 PROCEDURE — 80159 DRUG ASSAY CLOZAPINE: CPT | Performed by: PSYCHIATRY & NEUROLOGY

## 2024-08-01 PROCEDURE — 83880 ASSAY OF NATRIURETIC PEPTIDE: CPT

## 2024-08-01 PROCEDURE — 86140 C-REACTIVE PROTEIN: CPT

## 2024-08-01 PROCEDURE — 99232 SBSQ HOSP IP/OBS MODERATE 35: CPT | Performed by: PSYCHIATRY & NEUROLOGY

## 2024-08-01 RX ORDER — SODIUM CHLORIDE, SODIUM LACTATE, POTASSIUM CHLORIDE, CALCIUM CHLORIDE 600; 310; 30; 20 MG/100ML; MG/100ML; MG/100ML; MG/100ML
50 INJECTION, SOLUTION INTRAVENOUS CONTINUOUS
Status: CANCELLED | OUTPATIENT
Start: 2024-08-01

## 2024-08-01 RX ADMIN — FAMOTIDINE 20 MG: 20 TABLET ORAL at 09:11

## 2024-08-01 RX ADMIN — FAMOTIDINE 20 MG: 20 TABLET ORAL at 17:08

## 2024-08-01 RX ADMIN — SENNOSIDES AND DOCUSATE SODIUM 2 TABLET: 8.6; 5 TABLET ORAL at 09:11

## 2024-08-01 RX ADMIN — PROPRANOLOL HYDROCHLORIDE 10 MG: 10 TABLET ORAL at 21:15

## 2024-08-01 RX ADMIN — PROPRANOLOL HYDROCHLORIDE 10 MG: 10 TABLET ORAL at 09:11

## 2024-08-01 RX ADMIN — LORATADINE 10 MG: 10 TABLET ORAL at 09:11

## 2024-08-01 RX ADMIN — POLYETHYLENE GLYCOL 3350 17 G: 17 POWDER, FOR SOLUTION ORAL at 09:04

## 2024-08-01 RX ADMIN — NICOTINE POLACRILEX 2 MG: 2 GUM, CHEWING ORAL at 21:43

## 2024-08-01 RX ADMIN — GLYCOPYRROLATE 1 MG: 1 TABLET ORAL at 21:15

## 2024-08-01 RX ADMIN — METFORMIN HYDROCHLORIDE 500 MG: 500 TABLET, FILM COATED ORAL at 09:10

## 2024-08-01 RX ADMIN — CLOZAPINE 350 MG: 100 TABLET ORAL at 21:14

## 2024-08-01 RX ADMIN — GLYCOPYRROLATE 1 MG: 1 TABLET ORAL at 09:10

## 2024-08-01 RX ADMIN — ARIPIPRAZOLE 15 MG: 15 TABLET ORAL at 09:11

## 2024-08-01 RX ADMIN — PANTOPRAZOLE SODIUM 20 MG: 20 TABLET, DELAYED RELEASE ORAL at 06:14

## 2024-08-01 RX ADMIN — GLYCOPYRROLATE 1 MG: 1 TABLET ORAL at 17:08

## 2024-08-01 RX ADMIN — HYDROCHLOROTHIAZIDE 12.5 MG: 12.5 TABLET ORAL at 09:10

## 2024-08-01 RX ADMIN — ESCITALOPRAM OXALATE 20 MG: 10 TABLET, FILM COATED ORAL at 09:11

## 2024-08-01 RX ADMIN — CLOZAPINE 150 MG: 100 TABLET ORAL at 17:08

## 2024-08-01 RX ADMIN — SENNOSIDES AND DOCUSATE SODIUM 2 TABLET: 8.6; 5 TABLET ORAL at 17:08

## 2024-08-01 RX ADMIN — NICOTINE POLACRILEX 2 MG: 2 GUM, CHEWING ORAL at 13:44

## 2024-08-01 RX ADMIN — METFORMIN HYDROCHLORIDE 500 MG: 500 TABLET, FILM COATED ORAL at 17:08

## 2024-08-01 RX ADMIN — CYANOCOBALAMIN TAB 1000 MCG 1000 MCG: 1000 TAB at 09:11

## 2024-08-01 RX ADMIN — NICOTINE POLACRILEX 2 MG: 2 GUM, CHEWING ORAL at 09:29

## 2024-08-01 NOTE — NURSING NOTE
Pt is present on the milieu and social with select peers. Pt is pleasant and cooperative. Took HS medications without incidence. Pt endorses same AH. No behavioral issues.

## 2024-08-01 NOTE — NURSING NOTE
"Patient slept through breakfast this am. He states that \"I am still tired.\" Patient aware of medication change to see if it helps being less tired. He was  less irritable today. He is pleasant and cooperative. Medication compliant. Patient attended groups. No behavior issues   "

## 2024-08-01 NOTE — NURSING NOTE
Pt slept throughout the night for 6 + hours thus far. Respirations even and unlabored. Q 7 minute safety checks maintained.

## 2024-08-01 NOTE — PROGRESS NOTES
Psychiatry Progress Note [facility]    Alberto Berumen 28 y.o. male MRN: [id number]  Unit/Bed#: -02 Encounter: [id number]  Code Status: Level 1 - Full Code    PCP: Joce Juan MD    Date of Admission:  3/29/2024 2008   Date of Service:  08/01/24    Patient Active Problem List   Diagnosis    GERD (gastroesophageal reflux disease)    Medical clearance for psychiatric admission    Schizoaffective disorder, bipolar type (HCC)    Tobacco abuse    T wave inversion in EKG    Syringoma    Chronic idiopathic constipation    Vitamin B 12 deficiency    Vitamin D deficiency    Confluent and reticulate papillomatosis    Class 2 obesity in adult    Primary hypertension    Elevated hemoglobin A1c    Bilateral lower extremity edema     Review of systems: Unremarkable   psychiatric diagnosis: Schizoaffective bipolar  assessment  Overall Status: No significant changes still hearing threatening voices waiting for maintenance ECT every 2 weeks next due tomorrow   certification Statement: The patient will continue to require additional inpatient hospital stay due to ongoing voices that are threatening to mixing lack of response to medications and ECT so far.     Medications: Clozapine 500 mg at bedtime, propranolol 10 mg every 12 hours as as needed for anxiety, Abilify 15 mg mg at bedtime Lexapro 20 mg once a day, atropine eyedrops sublingual for drooling of saliva and senna 2 tablets twice a day for constipation  All medications reviewed and recommend they be continued for symptom management   side effects from treatment: None reported  Medication changes   Will switch clozapine as 350 mg at bedtime and 150 mg at 4:00 as he is reportedly sleepy in the morning and skips breakfast possibly from being tired from the clozapine at night  Medication education   Risks side effects benefits and precautions of medications discussed with patient and he did verbalize an understanding about risks for metabolic  syndrome from being on neuroleptics and is form tardive dyskinesia etc. and special precautions about being on clozapine   Understanding of medications: Has some understanding   Justification for dual anti-psychotics: Not applicable    Non-pharmacological treatments  Continue with individual, group, milieu and occupational therapy using recovery principles and psycho-education about accepting illness and the need for treatment.   to contact aunt and explore ACT team referral which he never had  ECT to be continued  q 2 weekly for maintenance x 6  Refuses to see a dietician and agrees to do more exercises as he is about 100 lbs overweight   Prolactin level high so Risperdal replaced with Abilify which he has tolerated well so far    Safety  Safety and communication plan established to target dynamic risk factors discussed above.    Discharge Plan   Back to his aunt with an ACT team once ECT is completed    Interval Progress   No significant changes with same threatening voices to kill him and his family that makes him feel depressed but not to the point of suicide and he continues to insist ECT have definitely helped him to stay out of his head with respect to voices and he is looking forward to having more ECTs as scheduled every 2 weeks.  Still reports some passive death wishes and remains somewhat isolated at the current did affect interacting with select peers continues to wear hospital clothing.  Needs counseling to keep appropriate boundaries with female peer.  Remains somewhat guarded and evasive preoccupied.  Compliant with medications attending some groups and did meet with his aunts the other day and he tells me his 3 sisters live in [place] and so is his mom who has a stroke in a nursing home in [facility].  Reportedly tired and sleepy in the mornings lately possibly from being on 500 mg clozapine at bedtime   acceptance by patient: Accepting  Hopefulness in recovery: Living with  his aunt in the [place]  Involved in reintegration process: Talking with family  Trusting in relationship with psychiatrist: Appears to trust  Sleep: Good  Appetite: Good  Compliance with Medications: Compliant  Group attendance: More than 50%,  5/8  Significant events: No major progress but feels ECTs are helping with the voices when she will continue scheduled ECTs before discharge home with an ACT team in place    mental Status Exam  Appearance: age appropriate, improved grooming, looks older than stated age, overweight, with hair in dreadlocks casually dressed with a clean shaven face, fairly groomed with good eye contact found walking in the hallway still dressed in hospital clothing with good eye contact   behavior: Cold and aloof as usual with no good mood reactivity  speech: normal rate, normal volume, normal pitch  Mood: dysphoric, anxious, continues to report feeling good with the ECT   affect: constricted, inappropriate, mood-congruent mildly anxious with constricted affect as usual   thought Process: organized, logical, coherent, goal directed, linear, decreased rate of thoughts, slowing of thoughts, negative thinking, impaired abstract reasoning, concrete  Thought Content: paranoid ideation, some paranoia, grandiose ideas, intrusive thoughts, preoccupied, chronic, continues to report paranoia about people threatening to kill him and his family because of the voices.  He believes ECT has helped so that he is not much in his head.  No other delusions elicited.  No current suicidal or homicidal thoughts and no plans verbalized but today reports having passive death wishes because of voices with no plans and able to CFS and it has not changed yet  .  No phobias obsessions compulsions or distorted body perceptions reported.  Preoccupied with wanting to get ECTs more often despite reminding him that it may impair his memory and finally he agreed to such as it is  every 2 weeks  Perceptual Disturbances:  Continues to hear the same threatening voices that tell him they are going to kill him and his family but he states ECTs have helped him to stay out of his head and they are less often and he believes ECTs have definitely helped him so far   Hx Risk Factors: chronic psychiatric problems, chronic anxiety symptoms, chronic psychotic symptoms,    Sensorium: Oriented x 3 spheres and situation  Cognition: recent and remote memory grossly intact  Consciousness: alert and awake  Attention: For the [place]  Intellect: appears to be of average intelligence  Insight: intact  Judgement: intact  Motor Activity: no abnormal movements     Vitals  Temp:  [97.5 °F (36.4 °C)-97.9 °F (36.6 °C)] 97.9 °F (36.6 °C)  HR:  [] 105  Resp:  [16-18] 18  BP: (109-138)/(70-92) 137/87  SpO2:  [96 %-98 %] 98 %  No intake or output data in the 24 hours ending 08/01/24 0738              Lab Results: All    Current Facility-Administered Medications   Medication Dose Route Frequency Provider Last Rate    acetaminophen  650 mg Oral Q4H PRN Jordan C Holter, DO      acetaminophen  650 mg Oral Q6H PRN HOLLI Lion      aluminum-magnesium hydroxide-simethicone  30 mL Oral Q4H PRN Jordan C Holter,       amLODIPine  5 mg Oral Daily HOLLI Lion      ARIPiprazole  15 mg Oral Daily Bora Rosario MD      Artificial Tears  1 drop Both Eyes Q3H PRN Jordan C Holter, DO      atropine  1 drop Sublingual Daily PRN HOLLI Lion      haloperidol lactate  2.5 mg Intramuscular Q4H PRN Max 4/day HOLLI Lion      And    LORazepam  1 mg Intramuscular Q4H PRN Max 4/day HOLLI Lion      And    benztropine  0.5 mg Intramuscular Q4H PRN Max 4/day HOLLI Lion      benztropine  1 mg Intramuscular Q4H PRN Max 6/day Jordan C Holter,       haloperidol lactate  5 mg Intramuscular Q4H PRN Max 4/day HOLLI Lion      And    LORazepam  2 mg Intramuscular Q4H PRN Max 4/day HOLLI Lion      And    benztropine  1 mg  Intramuscular Q4H PRN Max 4/day HOLLI Lion      benztropine  1 mg Oral Q4H PRN Max 6/day HOLLI Lion      benztropine  1 mg Oral Q4H PRN Max 6/day Ion Dupontter, DO      bisacodyl  10 mg Rectal Daily PRN HOLLI Lion      calcium carbonate  500 mg Oral BID PRN Eileen Jensen, HOLLI      cloZAPine  500 mg Oral HS Bora Rosario MD      cyanocobalamin  1,000 mcg Oral Daily Pia Mantilla, HOLLI      hydrOXYzine HCL  50 mg Oral Q6H PRN Max 4/day Jordan C Holter, DO      Or    diphenhydrAMINE  50 mg Intramuscular Q6H PRN Jordan C Holter, DO      hydrOXYzine HCL  50 mg Oral Q6H PRN Max 4/day HOLLI Lion      Or    diphenhydrAMINE  50 mg Intramuscular Q6H PRN HOLLI Lion      diphenhydrAMINE-zinc acetate   Topical BID PRN HOLLI Lion      escitalopram  20 mg Oral Daily HOLLI Lion      famotidine  20 mg Oral BID Eileen Jensen, HOLLI      fluticasone  1 spray Each Nare Daily Brina Guillen MD      glycopyrrolate  1 mg Oral TID Bora Rosario MD      haloperidol  1 mg Oral Q6H PRN HOLLI Lion      haloperidol  2.5 mg Oral Q4H PRN Max 4/day HOLLI Lion      haloperidol  5 mg Oral Q4H PRN Max 4/day HOLLI Lion      hydroCHLOROthiazide  12.5 mg Oral Daily HOLLI Galvan      hydrocortisone   Topical 4x Daily PRN HOLLI Lion      hydrOXYzine HCL  100 mg Oral Q6H PRN Max 4/day Ion Dupontter, DO      Or    LORazepam  2 mg Intramuscular Q6H PRN Ion Dupontter, DO      hydrOXYzine HCL  100 mg Oral Q6H PRN Max 4/day HOLLI Lion      Or    LORazepam  2 mg Intramuscular Q6H PRN HOLLI Lion      hydrOXYzine HCL  25 mg Oral Q6H PRN Max 4/day Ion Dupontter, DO      ibuprofen  600 mg Oral Q8H PRN Eveline Hangey, CRNP      Lidocaine Viscous HCl  15 mL Swish & Spit 4x Daily PRN HOLLI Galvan      loratadine  10 mg Oral Daily Brina Guillen MD      melatonin  3 mg Oral HS PRN Bora Rosario MD      metFORMIN  500  mg Oral BID With Meals HOLLI Galvan      methocarbamol  500 mg Oral Q6H PRN HOLLI Lion      nicotine polacrilex  2 mg Oral Q4H PRN Bora Rosario MD      OLANZapine  5 mg Oral Q4H PRN Max 3/day [facility] Holter, DO      Or    OLANZapine  2.5 mg Intramuscular Q4H PRN Max 3/day [facility] Holter, DO      OLANZapine  5 mg Oral Q3H PRN Max 3/day [facility] Holter, DO      Or    OLANZapine  5 mg Intramuscular Q3H PRN Max 3/day [facility] Holter, DO      OLANZapine  2.5 mg Oral Q4H PRN Max 6/day [facility] Holter, DO      ondansetron  4 mg Oral Q6H PRN HOLLI Lion      pantoprazole  20 mg Oral Early Morning Eileen Holliday HOLLI Jensen      polyethylene glycol  17 g Oral Daily HOLLI Lion      polyethylene glycol  17 g Oral Daily PRN [facility] Holter, DO      propranolol  10 mg Oral Q12H KAYLIE HOLLI Lion      senna-docusate sodium  1 tablet Oral Daily PRN [facility] Cresencioter, DO      senna-docusate sodium  2 tablet Oral BID HOLLI Lion      traZODone  50 mg Oral HS PRN HOLLI Lion      white petrolatum-mineral oil   Topical TID PRN HOLLI Lion         Counseling / Coordination of Care: Total floor / unit time spent today 15 minutes. Greater than 50% of total time was spent with the patient and / or family counseling and / or somewhat receptive to supportive listening and teaching positive coping skills to deal with symptom mangement.     Patient's Rights, confidentiality and exceptions to confidentiality, use of automated medical record, Behavioral Health Services staff access to medical record, and consent to treatment reviewed.    This note has been dictated and hence there may be problems with punctuation, spelling and formatting and if anyone has any concerns please address them to Dr. Rosario   This note is not shared with patient due to potential for making patient's condition worse by knowing the content of the note.

## 2024-08-01 NOTE — PLAN OF CARE
Problem: Alteration in Thoughts and Perception  Goal: Treatment Goal: Gain control of psychotic behaviors/thinking, reduce/eliminate presenting symptoms and demonstrate improved reality functioning upon discharge  Outcome: Progressing  Goal: Verbalize thoughts and feelings  Description: Interventions:  - Promote a nonjudgmental and trusting relationship with the patient through active listening and therapeutic communication  - Assess patient's level of functioning, behavior and potential for risk  - Engage patient in 1 on 1 interactions  - Encourage patient to express fears, feelings, frustrations, and discuss symptoms    - [place] patient to reality, help patient recognize reality-based thinking   - Administer medications as ordered and assess for potential side effects  - Provide the patient education related to the signs and symptoms of the illness and desired effects of prescribed medications  Outcome: Progressing  Goal: Refrain from acting on delusional thinking/internal stimuli  Description: Interventions:  - Monitor patient closely, per order   - Utilize least restrictive measures   - Set reasonable limits, give positive feedback for acceptable   - Administer medications as ordered and monitor of potential side effects  Outcome: Progressing  Goal: Agree to be compliant with medication regime, as prescribed and report medication side effects  Description: Interventions:  - Offer appropriate PRN medication and supervise ingestion; conduct AIMS, as needed   Outcome: Progressing  Goal: Attend and participate in unit activities, including therapeutic, recreational, and educational groups  Description: Interventions:  -Encourage Visitation and family involvement in care  Outcome: Progressing  Goal: Recognize dysfunctional thoughts, communicate reality-based thoughts at the time of discharge  Description: Interventions:  - Provide medication and psycho-education to assist patient in compliance and developing  insight into his/her illness   Outcome: Progressing  Goal: Complete daily ADLs, including personal hygiene independently, as able  Description: Interventions:  - Observe, teach, and assist patient with ADLS  - Monitor and promote a balance of rest/activity, with adequate nutrition and elimination   Outcome: Progressing     Problem: Ineffective Coping  Goal: Identifies ineffective coping skills  Outcome: Progressing  Goal: Identifies healthy coping skills  Outcome: Progressing  Goal: Demonstrates healthy coping skills  Outcome: Progressing  Goal: Participates in unit activities  Description: Interventions:  - Provide therapeutic environment   - Provide required programming   - Redirect inappropriate behaviors   Outcome: Progressing     Problem: Depression  Goal: Treatment Goal: Demonstrate behavioral control of depressive symptoms, verbalize feelings of improved mood/affect, and adopt new coping skills prior to discharge  Outcome: Progressing  Goal: Verbalize thoughts and feelings  Description: Interventions:  - Assess and re-assess patient's level of risk   - Engage patient in 1:1 interactions, daily, for a minimum of 15 minutes   - Encourage patient to express feelings, fears, frustrations, hopes   Outcome: Progressing  Goal: Refrain from harming self  Description: Interventions:  - Monitor patient closely, per order   - Supervise medication ingestion, monitor effects and side effects   Outcome: Progressing  Goal: Refrain from isolation  Description: Interventions:  - Develop a trusting relationship   - Encourage socialization   Outcome: Progressing     Problem: Anxiety  Goal: Anxiety is at manageable level  Description: Interventions:  - Assess and monitor patient's anxiety level.   - Monitor for signs and symptoms (heart palpitations, chest pain, shortness of breath, headaches, nausea, feeling jumpy, restlessness, irritable, apprehensive).   - Collaborate with interdisciplinary team and initiate plan and  interventions as ordered.  - [place] patient to unit/surroundings  - Explain treatment plan  - Encourage participation in care  - Encourage verbalization of concerns/fears  - Identify coping mechanisms  - Assist in developing anxiety-reducing skills  - Administer/offer alternative therapies  - Limit or eliminate stimulants  Outcome: Progressing

## 2024-08-01 NOTE — PROGRESS NOTES
08/01/24 0812   Team Meeting   Meeting Type Daily Rounds   Team Members Present   Team Members Present Physician;Nurse;;Other (Discipline and Name)   Physician Team Member Holter, Thomas   Nursing Team Member Claudia   Social Work Team Member Jose J ORTEGA   Other (Discipline and Name) Jeremias MS   Patient/Family Present   Patient Present No   Patient's Family Present No     Groups Participation  5/8.   Patient's compliant with medications. He's engaged in his treatment. Labile moods at times. He interacts with select peers.

## 2024-08-02 ENCOUNTER — ANESTHESIA (INPATIENT)
Dept: PREOP/PACU | Facility: HOSPITAL | Age: 29
DRG: 750 | End: 2024-08-02
Payer: COMMERCIAL

## 2024-08-02 ENCOUNTER — ANESTHESIA EVENT (INPATIENT)
Dept: PREOP/PACU | Facility: HOSPITAL | Age: 29
DRG: 750 | End: 2024-08-02
Payer: COMMERCIAL

## 2024-08-02 ENCOUNTER — APPOINTMENT (INPATIENT)
Dept: PREOP/PACU | Facility: HOSPITAL | Age: 29
DRG: 750 | End: 2024-08-02
Attending: PSYCHIATRY & NEUROLOGY
Payer: COMMERCIAL

## 2024-08-02 LAB — PROLACTIN SERPL-MCNC: 6.38 NG/ML

## 2024-08-02 PROCEDURE — 90870 ELECTROCONVULSIVE THERAPY: CPT | Performed by: PSYCHIATRY & NEUROLOGY

## 2024-08-02 PROCEDURE — GZB2ZZZ ELECTROCONVULSIVE THERAPY, BILATERAL-SINGLE SEIZURE: ICD-10-PCS | Performed by: PSYCHIATRY & NEUROLOGY

## 2024-08-02 PROCEDURE — 99232 SBSQ HOSP IP/OBS MODERATE 35: CPT | Performed by: PSYCHIATRY & NEUROLOGY

## 2024-08-02 PROCEDURE — 90870 ELECTROCONVULSIVE THERAPY: CPT | Performed by: ANESTHESIOLOGY

## 2024-08-02 RX ORDER — SODIUM CHLORIDE, SODIUM LACTATE, POTASSIUM CHLORIDE, CALCIUM CHLORIDE 600; 310; 30; 20 MG/100ML; MG/100ML; MG/100ML; MG/100ML
50 INJECTION, SOLUTION INTRAVENOUS CONTINUOUS
Status: DISCONTINUED | OUTPATIENT
Start: 2024-08-02 | End: 2024-08-02

## 2024-08-02 RX ORDER — KETOROLAC TROMETHAMINE 30 MG/ML
INJECTION, SOLUTION INTRAMUSCULAR; INTRAVENOUS AS NEEDED
Status: DISCONTINUED | OUTPATIENT
Start: 2024-08-02 | End: 2024-08-02

## 2024-08-02 RX ORDER — SODIUM CHLORIDE, SODIUM LACTATE, POTASSIUM CHLORIDE, CALCIUM CHLORIDE 600; 310; 30; 20 MG/100ML; MG/100ML; MG/100ML; MG/100ML
50 INJECTION, SOLUTION INTRAVENOUS CONTINUOUS
Status: DISCONTINUED | OUTPATIENT
Start: 2024-08-02 | End: 2024-09-18

## 2024-08-02 RX ORDER — LABETALOL HYDROCHLORIDE 5 MG/ML
INJECTION, SOLUTION INTRAVENOUS AS NEEDED
Status: DISCONTINUED | OUTPATIENT
Start: 2024-08-02 | End: 2024-08-02

## 2024-08-02 RX ORDER — SODIUM CHLORIDE, SODIUM LACTATE, POTASSIUM CHLORIDE, CALCIUM CHLORIDE 600; 310; 30; 20 MG/100ML; MG/100ML; MG/100ML; MG/100ML
100 INJECTION, SOLUTION INTRAVENOUS CONTINUOUS
Status: DISCONTINUED | OUTPATIENT
Start: 2024-08-02 | End: 2024-08-02

## 2024-08-02 RX ORDER — SODIUM CHLORIDE, SODIUM LACTATE, POTASSIUM CHLORIDE, CALCIUM CHLORIDE 600; 310; 30; 20 MG/100ML; MG/100ML; MG/100ML; MG/100ML
INJECTION, SOLUTION INTRAVENOUS CONTINUOUS PRN
Status: DISCONTINUED | OUTPATIENT
Start: 2024-08-02 | End: 2024-08-02

## 2024-08-02 RX ORDER — ETOMIDATE 2 MG/ML
INJECTION INTRAVENOUS AS NEEDED
Status: DISCONTINUED | OUTPATIENT
Start: 2024-08-02 | End: 2024-08-02

## 2024-08-02 RX ORDER — GLYCOPYRROLATE 0.2 MG/ML
INJECTION INTRAMUSCULAR; INTRAVENOUS AS NEEDED
Status: DISCONTINUED | OUTPATIENT
Start: 2024-08-02 | End: 2024-08-02

## 2024-08-02 RX ORDER — ESMOLOL HYDROCHLORIDE 10 MG/ML
INJECTION INTRAVENOUS AS NEEDED
Status: DISCONTINUED | OUTPATIENT
Start: 2024-08-02 | End: 2024-08-02

## 2024-08-02 RX ORDER — HYDRALAZINE HYDROCHLORIDE 20 MG/ML
INJECTION INTRAMUSCULAR; INTRAVENOUS AS NEEDED
Status: DISCONTINUED | OUTPATIENT
Start: 2024-08-02 | End: 2024-08-02

## 2024-08-02 RX ORDER — SUCCINYLCHOLINE/SOD CL,ISO/PF 100 MG/5ML
SYRINGE (ML) INTRAVENOUS AS NEEDED
Status: DISCONTINUED | OUTPATIENT
Start: 2024-08-02 | End: 2024-08-02

## 2024-08-02 RX ORDER — MIDAZOLAM HYDROCHLORIDE 2 MG/2ML
INJECTION, SOLUTION INTRAMUSCULAR; INTRAVENOUS AS NEEDED
Status: DISCONTINUED | OUTPATIENT
Start: 2024-08-02 | End: 2024-08-02

## 2024-08-02 RX ORDER — SODIUM CHLORIDE, SODIUM LACTATE, POTASSIUM CHLORIDE, CALCIUM CHLORIDE 600; 310; 30; 20 MG/100ML; MG/100ML; MG/100ML; MG/100ML
75 INJECTION, SOLUTION INTRAVENOUS CONTINUOUS
Status: DISCONTINUED | OUTPATIENT
Start: 2024-08-02 | End: 2024-08-02

## 2024-08-02 RX ORDER — SODIUM CHLORIDE, SODIUM LACTATE, POTASSIUM CHLORIDE, CALCIUM CHLORIDE 600; 310; 30; 20 MG/100ML; MG/100ML; MG/100ML; MG/100ML
125 INJECTION, SOLUTION INTRAVENOUS CONTINUOUS
Status: DISCONTINUED | OUTPATIENT
Start: 2024-08-02 | End: 2024-08-02

## 2024-08-02 RX ADMIN — Medication 50 MG: at 07:04

## 2024-08-02 RX ADMIN — POLYETHYLENE GLYCOL 3350 17 G: 17 POWDER, FOR SOLUTION ORAL at 08:38

## 2024-08-02 RX ADMIN — NICOTINE POLACRILEX 2 MG: 2 GUM, CHEWING ORAL at 17:03

## 2024-08-02 RX ADMIN — CLOZAPINE 150 MG: 100 TABLET ORAL at 17:03

## 2024-08-02 RX ADMIN — Medication 140 MG: at 07:04

## 2024-08-02 RX ADMIN — FAMOTIDINE 20 MG: 20 TABLET ORAL at 08:36

## 2024-08-02 RX ADMIN — CLOZAPINE 350 MG: 100 TABLET ORAL at 21:20

## 2024-08-02 RX ADMIN — MIDAZOLAM 2 MG: 1 INJECTION INTRAMUSCULAR; INTRAVENOUS at 07:09

## 2024-08-02 RX ADMIN — ESCITALOPRAM OXALATE 20 MG: 10 TABLET, FILM COATED ORAL at 08:36

## 2024-08-02 RX ADMIN — PROPRANOLOL HYDROCHLORIDE 10 MG: 10 TABLET ORAL at 06:05

## 2024-08-02 RX ADMIN — SENNOSIDES AND DOCUSATE SODIUM 2 TABLET: 8.6; 5 TABLET ORAL at 08:37

## 2024-08-02 RX ADMIN — GLYCOPYRROLATE 1 MG: 1 TABLET ORAL at 17:03

## 2024-08-02 RX ADMIN — GLYCOPYRROLATE 1 MG: 1 TABLET ORAL at 21:21

## 2024-08-02 RX ADMIN — FLUTICASONE PROPIONATE 1 SPRAY: 50 SPRAY, METERED NASAL at 08:38

## 2024-08-02 RX ADMIN — SENNOSIDES AND DOCUSATE SODIUM 2 TABLET: 8.6; 5 TABLET ORAL at 17:03

## 2024-08-02 RX ADMIN — LIDOCAINE HYDROCHLORIDE 15 ML: 20 SOLUTION ORAL at 17:10

## 2024-08-02 RX ADMIN — FAMOTIDINE 20 MG: 20 TABLET ORAL at 17:03

## 2024-08-02 RX ADMIN — NICOTINE POLACRILEX 2 MG: 2 GUM, CHEWING ORAL at 21:21

## 2024-08-02 RX ADMIN — LABETALOL HYDROCHLORIDE 20 MG: 5 INJECTION, SOLUTION INTRAVENOUS at 07:04

## 2024-08-02 RX ADMIN — METFORMIN HYDROCHLORIDE 500 MG: 500 TABLET, FILM COATED ORAL at 17:03

## 2024-08-02 RX ADMIN — HYDRALAZINE HYDROCHLORIDE 20 MG: 20 INJECTION INTRAMUSCULAR; INTRAVENOUS at 07:04

## 2024-08-02 RX ADMIN — ETOMIDATE INJECTION 220 MG: 2 SOLUTION INTRAVENOUS at 07:04

## 2024-08-02 RX ADMIN — METFORMIN HYDROCHLORIDE 500 MG: 500 TABLET, FILM COATED ORAL at 08:36

## 2024-08-02 RX ADMIN — ARIPIPRAZOLE 15 MG: 15 TABLET ORAL at 08:37

## 2024-08-02 RX ADMIN — PANTOPRAZOLE SODIUM 20 MG: 20 TABLET, DELAYED RELEASE ORAL at 06:05

## 2024-08-02 RX ADMIN — HYDROCHLOROTHIAZIDE 12.5 MG: 12.5 TABLET ORAL at 08:37

## 2024-08-02 RX ADMIN — PROPRANOLOL HYDROCHLORIDE 10 MG: 10 TABLET ORAL at 21:21

## 2024-08-02 RX ADMIN — AMLODIPINE BESYLATE 5 MG: 5 TABLET ORAL at 08:37

## 2024-08-02 RX ADMIN — KETOROLAC TROMETHAMINE 30 MG: 30 INJECTION, SOLUTION INTRAMUSCULAR; INTRAVENOUS at 07:04

## 2024-08-02 RX ADMIN — GLYCOPYRROLATE 1 MG: 1 TABLET ORAL at 08:37

## 2024-08-02 RX ADMIN — SODIUM CHLORIDE, SODIUM LACTATE, POTASSIUM CHLORIDE, AND CALCIUM CHLORIDE: .6; .31; .03; .02 INJECTION, SOLUTION INTRAVENOUS at 06:46

## 2024-08-02 RX ADMIN — CYANOCOBALAMIN TAB 1000 MCG 1000 MCG: 1000 TAB at 08:36

## 2024-08-02 RX ADMIN — LORATADINE 10 MG: 10 TABLET ORAL at 08:36

## 2024-08-02 RX ADMIN — GLYCOPYRROLATE 0.2 MG: 0.2 INJECTION INTRAMUSCULAR; INTRAVENOUS at 07:04

## 2024-08-02 NOTE — PROGRESS NOTES
Psychiatry Progress Note [facility]    Alberto Berumen 28 y.o. male MRN: [id number]  Unit/Bed#: [facility] 108-02 Encounter: [id number]  Code Status: Level 1 - Full Code    PCP: Joce Juan MD    Date of Admission:  3/29/2024 2008   Date of Service:  08/02/24    Patient Active Problem List   Diagnosis    GERD (gastroesophageal reflux disease)    Medical clearance for psychiatric admission    Schizoaffective disorder, bipolar type (HCC)    Tobacco abuse    T wave inversion in EKG    Syringoma    Chronic idiopathic constipation    Vitamin B 12 deficiency    Vitamin D deficiency    Confluent and reticulate papillomatosis    Class 2 obesity in adult    Primary hypertension    Elevated hemoglobin A1c    Bilateral lower extremity edema     Review of systems: Unremarkable  psychiatric diagnosis: Schizoaffective bipolar  assessment  Overall Status: Had his 2 weekly maintenance ECT today which he tolerated well so far.  Still with same voices as before without threatening but he continues to insist ECTs have been helpful   certification Statement: The patient will continue to require additional inpatient hospital stay due to ongoing voices that are threatening to mixing lack of response to medications and ECT so far.     Medications: Clozapine 500 mg at bedtime, propranolol 10 mg every 12 hours as as needed for anxiety, Abilify 15 mg mg at bedtime Lexapro 20 mg once a day, atropine eyedrops sublingual for drooling of saliva and senna 2 tablets twice a day for constipation  All medications reviewed and recommend they be continued for symptom management   side effects from treatment: None reported  Medication changes   Will switch clozapine as 350 mg at bedtime and 150 mg at 4:00 as he is reportedly sleepy in the morning and skips breakfast possibly from being tired from the clozapine at night  Medication education   Risks side effects benefits and precautions of medications discussed with patient and he  did verbalize an understanding about risks for metabolic syndrome from being on neuroleptics and is form tardive dyskinesia etc. and special precautions about being on clozapine   Understanding of medications: Has some understanding   Justification for dual anti-psychotics: Not applicable    Non-pharmacological treatments  Continue with individual, group, milieu and occupational therapy using recovery principles and psycho-education about accepting illness and the need for treatment.   to contact aunt and explore ACT team referral which he never had  ECT to be continued  q 2 weekly for maintenance x 6  Refuses to see a dietician and agrees to do more exercises as he is about 100 lbs overweight   Prolactin level high so Risperdal replaced with Abilify which he has tolerated well so far    Safety  Safety and communication plan established to target dynamic risk factors discussed above.    Discharge Plan   Back to his aunt with an ACT team once ECT is completed    Interval Progress   Has tolerated the ECT procedure well with no problems or issues but says he is now getting every 2 weeks for maintenance and he claims it has definitely helped the voices to keep him stay out of his head despite still hearing voices that are trying to get him and his family.  He remains isolated as usual with a constricted affect interacting with select peers staying in bed in the morning skipping breakfast so that her clozapine was changed to 150 at 4:00 and 350 at bedtime which he has tolerated well so far.  Understands that he needs to keep appropriate boundaries with female peers.  Still somewhat evasive guarded preoccupied as usual compliant with medications attending some groups.   acceptance by patient: Accepting  Hopefulness in recovery: Living with his aunt in the [facility]  Involved in reintegration process: Talking with his aunt and the rest of the family  Trusting in relationship with psychiatrist: Appearing to  trust  Sleep: Good  Appetite: Good  Compliance with Medications: Good  Group attendance: More than 50% 8/10  Significant events and progress towards goals: continues to report ECTs have been helpful with the voices which he has tolerated well so far     mental Status Exam  Appearance: age appropriate, improved grooming, looks older than stated age, overweight, with hair in dreadlocks casually dressed with a clean shaven face, fairly groomed with good eye contact feeling tired just had ECT still dressed in hospital clothing   behavior: Cold aloof with no good mood reactivity as usual with limited responses  speech: normal rate, normal volume, normal pitch  Mood: dysphoric, anxious, continues to report feeling good with the ECT   affect: constricted, inappropriate, mood-congruent mildly anxious with constricted affect  thought Process: organized, logical, coherent, goal directed, linear, decreased rate of thoughts, slowing of thoughts, negative thinking, impaired abstract reasoning, concrete  Thought Content: paranoid ideation, some paranoia, grandiose ideas, intrusive thoughts, preoccupied, chronic, continues to report paranoia about people threatening to kill him and his family because of the voices.  He believes ECT has helped so that he is not much in his head.  No other delusions elicited.  No current suicidal or homicidal thoughts and no plans verbalized but today reports having passive death wishes because of voices with no plans and able to CFS and it has not changed yet  .  No phobias obsessions compulsions or distorted body perceptions reported.  Preoccupied with wanting to get ECTs more often despite reminding him that it may impair his memory and finally he agreed to such as it is  every 2 weeks  Perceptual Disturbances: Claims voices are less often but they are still threatening to kill him and his family and he continues to insist ECTs have definitely helped  Hx Risk Factors: chronic psychiatric  problems, chronic anxiety symptoms, chronic psychotic symptoms,    Sensorium: Oriented to 3 spheres and situation  Cognition: recent and remote memory grossly intact  Consciousness: alert and awake  Attention: Attention and concentration fair  Intellect: appears to be of average intelligence  Insight: intact  Judgement: intact  Motor Activity: no abnormal movements     Vitals  Temp:  [97.3 °F (36.3 °C)-97.5 °F (36.4 °C)] 97.5 °F (36.4 °C)  HR:  [] 86  Resp:  [12-22] 18  BP: (116-162)/(68-87) 132/68  SpO2:  [95 %-99 %] 96 %    Intake/Output Summary (Last 24 hours) at 8/2/2024 0756  Last data filed at 8/2/2024 0720  Gross per 24 hour   Intake 400 ml   Output --   Net 400 ml                 Lab Results: All    Current Facility-Administered Medications   Medication Dose Route Frequency Provider Last Rate    acetaminophen  650 mg Oral Q4H PRN Jordan C Holter,       acetaminophen  650 mg Oral Q6H PRN HOLLI Lion      aluminum-magnesium hydroxide-simethicone  30 mL Oral Q4H PRN Jordan C Holter,       amLODIPine  5 mg Oral Daily HOLLI Lion      ARIPiprazole  15 mg Oral Daily Bora Rosario MD      Artificial Tears  1 drop Both Eyes Q3H PRN Jordan C Holter,       atropine  1 drop Sublingual Daily PRN HOLLI Lion      haloperidol lactate  2.5 mg Intramuscular Q4H PRN Max 4/day HOLLI Lion      And    LORazepam  1 mg Intramuscular Q4H PRN Max 4/day HOLLI Lion      And    benztropine  0.5 mg Intramuscular Q4H PRN Max 4/day HOLLI Lion      benztropine  1 mg Intramuscular Q4H PRN Max 6/day Jordan C Holter,       haloperidol lactate  5 mg Intramuscular Q4H PRN Max 4/day HOLLI Lion      And    LORazepam  2 mg Intramuscular Q4H PRN Max 4/day HOLLI Lion      And    benztropine  1 mg Intramuscular Q4H PRN Max 4/day HOLLI Lion      benztropine  1 mg Oral Q4H PRN Max 6/day HOLLI Lion      benztropine  1 mg Oral Q4H PRN Max 6/day Jordan C Holter,  DO      bisacodyl  10 mg Rectal Daily PRN HOLLI Lion      calcium carbonate  500 mg Oral BID PRN HOLLI Monge      cloZAPine  150 mg Oral Before Dinner Bora Rosario MD      cloZAPine  350 mg Oral HS Bora Rosario MD      cyanocobalamin  1,000 mcg Oral Daily HOLLI Galvan      hydrOXYzine HCL  50 mg Oral Q6H PRN Max 4/day Jordan C Holter, DO      Or    diphenhydrAMINE  50 mg Intramuscular Q6H PRN Jordan C Holter, DO      hydrOXYzine HCL  50 mg Oral Q6H PRN Max 4/day HOLLI Lion      Or    diphenhydrAMINE  50 mg Intramuscular Q6H PRN HOLLI Lion      diphenhydrAMINE-zinc acetate   Topical BID PRN HOLLI Lion      escitalopram  20 mg Oral Daily HOLLI Lion      famotidine  20 mg Oral BID HOLLI Monge      fluticasone  1 spray Each Nare Daily Brina Guillen MD      glycopyrrolate  1 mg Oral TID Bora Rosario MD      haloperidol  1 mg Oral Q6H PRN HOLLI Lion      haloperidol  2.5 mg Oral Q4H PRN Max 4/day HOLLI Lion      haloperidol  5 mg Oral Q4H PRN Max 4/day HOLLI Lion      hydroCHLOROthiazide  12.5 mg Oral Daily HOLLI Galvan      hydrocortisone   Topical 4x Daily PRN HOLLI Lion      hydrOXYzine HCL  100 mg Oral Q6H PRN Max 4/day Jordan C Holter, DO      Or    LORazepam  2 mg Intramuscular Q6H PRN Jordan C Holter, DO      hydrOXYzine HCL  100 mg Oral Q6H PRN Max 4/day HOLLI Lion      Or    LORazepam  2 mg Intramuscular Q6H PRN HOLLI Lion      hydrOXYzine HCL  25 mg Oral Q6H PRN Max 4/day Jordan C Holter, DO      ibuprofen  600 mg Oral Q8H PRN HOLLI Lion      lactated ringers  50 mL/hr Intravenous Continuous Antwan Dong MD      Lidocaine Viscous HCl  15 mL Swish & Spit 4x Daily PRN HOLLI Galvan      loratadine  10 mg Oral Daily Brina Guillen MD      melatonin  3 mg Oral HS PRN Bora Rosario MD      metFORMIN  500 mg Oral BID With Meals Pia  HOLLI Mantilla      methocarbamol  500 mg Oral Q6H PRN HOLLI Lion      nicotine polacrilex  2 mg Oral Q4H PRN Bora Rosario MD      OLANZapine  5 mg Oral Q4H PRN Max 3/day [facility] Holter, DO      Or    OLANZapine  2.5 mg Intramuscular Q4H PRN Max 3/day [facility] Holter, DO      OLANZapine  5 mg Oral Q3H PRN Max 3/day [facility] Holter, DO      Or    OLANZapine  5 mg Intramuscular Q3H PRN Max 3/day [facility] Holter, DO      OLANZapine  2.5 mg Oral Q4H PRN Max 6/day [facility] Holter, DO      ondansetron  4 mg Oral Q6H PRN HOLLI Lion      pantoprazole  20 mg Oral Early Morning Eileenaddie CherryHOLLI Jimenez      polyethylene glycol  17 g Oral Daily HOLLI Lion      polyethylene glycol  17 g Oral Daily PRN [facility] Holter, DO      propranolol  10 mg Oral Q12H KAYLIE HOLLI Lion      senna-docusate sodium  1 tablet Oral Daily PRN [facility] Cresencioter, DO      senna-docusate sodium  2 tablet Oral BID HOLLI Lion      traZODone  50 mg Oral HS PRN HOLLI Lion      white petrolatum-mineral oil   Topical TID PRN HOLLI Lion         Counseling / Coordination of Care: Total floor / unit time spent today 15 minutes. Greater than 50% of total time was spent with the patient and / or family counseling and / or somewhat receptive to supportive listening and teaching positive coping skills to deal with symptom mangement.     Patient's Rights, confidentiality and exceptions to confidentiality, use of automated medical record, [facility] staff access to medical record, and consent to treatment reviewed.    This note has been dictated and hence there may be problems with punctuation, spelling and formatting and if anyone has any concerns please address them to Dr. Rosario   This note is not shared with patient due to potential for making patient's condition worse by knowing the content of the note.

## 2024-08-02 NOTE — PLAN OF CARE
Problem: Alteration in Thoughts and Perception  Goal: Verbalize thoughts and feelings  Description: Interventions:  - Promote a nonjudgmental and trusting relationship with the patient through active listening and therapeutic communication  - Assess patient's level of functioning, behavior and potential for risk  - Engage patient in 1 on 1 interactions  - Encourage patient to express fears, feelings, frustrations, and discuss symptoms    - [place] patient to reality, help patient recognize reality-based thinking   - Administer medications as ordered and assess for potential side effects  - Provide the patient education related to the signs and symptoms of the illness and desired effects of prescribed medications  Outcome: Not Progressing  Goal: Refrain from acting on delusional thinking/internal stimuli  Description: Interventions:  - Monitor patient closely, per order   - Utilize least restrictive measures   - Set reasonable limits, give positive feedback for acceptable   - Administer medications as ordered and monitor of potential side effects  Outcome: Progressing  Goal: Agree to be compliant with medication regime, as prescribed and report medication side effects  Description: Interventions:  - Offer appropriate PRN medication and supervise ingestion; conduct AIMS, as needed   Outcome: Progressing  Goal: Recognize dysfunctional thoughts, communicate reality-based thoughts at the time of discharge  Description: Interventions:  - Provide medication and psycho-education to assist patient in compliance and developing insight into his/her illness   Outcome: Not Progressing  Goal: Complete daily ADLs, including personal hygiene independently, as able  Description: Interventions:  - Observe, teach, and assist patient with ADLS  - Monitor and promote a balance of rest/activity, with adequate nutrition and elimination   Outcome: Progressing     Problem: Ineffective Coping  Goal: Identifies ineffective coping  skills  Outcome: Not Progressing  Goal: Identifies healthy coping skills  Outcome: Not Progressing  Goal: Demonstrates healthy coping skills  Outcome: Not Progressing  Goal: Participates in unit activities  Description: Interventions:  - Provide therapeutic environment   - Provide required programming   - Redirect inappropriate behaviors   Outcome: Not Progressing  Goal: Patient/Family participate in treatment and DC plans  Description: Interventions:  - Provide therapeutic environment  Outcome: Not Progressing     Problem: Depression  Goal: Verbalize thoughts and feelings  Description: Interventions:  - Assess and re-assess patient's level of risk   - Engage patient in 1:1 interactions, daily, for a minimum of 15 minutes   - Encourage patient to express feelings, fears, frustrations, hopes   Outcome: Not Progressing  Goal: Refrain from harming self  Description: Interventions:  - Monitor patient closely, per order   - Supervise medication ingestion, monitor effects and side effects   Outcome: Progressing  Goal: Refrain from isolation  Description: Interventions:  - Develop a trusting relationship   - Encourage socialization   Outcome: Not Progressing  Goal: Attend and participate in unit activities, including therapeutic, recreational, and educational groups  Description: Interventions:  - Provide therapeutic and educational activities daily, encourage attendance and participation, and document same in the medical record   Outcome: Not Progressing  Goal: Complete daily ADLs, including personal hygiene independently, as able  Description: Interventions:  - Observe, teach, and assist patient with ADLS  -  Monitor and promote a balance of rest/activity, with adequate nutrition and elimination   Outcome: Progressing     Problem: Anxiety  Goal: Anxiety is at manageable level  Description: Interventions:  - Assess and monitor patient's anxiety level.   - Monitor for signs and symptoms (heart palpitations, chest pain,  shortness of breath, headaches, nausea, feeling jumpy, restlessness, irritable, apprehensive).   - Collaborate with interdisciplinary team and initiate plan and interventions as ordered.  - [place] patient to unit/surroundings  - Explain treatment plan  - Encourage participation in care  - Encourage verbalization of concerns/fears  - Identify coping mechanisms  - Assist in developing anxiety-reducing skills  - Administer/offer alternative therapies  - Limit or eliminate stimulants  Outcome: Progressing     Problem: Alteration in Orientation  Goal: Interact with staff daily  Description: Interventions:  - Assess and re-assess patient's level of orientation  - Engage patient in 1 on 1 interactions, daily, for a minimum of 15 minutes   - Establish rapport/trust with patient   Outcome: Progressing  Goal: Express concerns related to confused thinking related to:  Description: Interventions:  - Encourage patient to express feelings, fears, frustrations, hopes  - Assign consistent caregivers   - [place]/re-orient patient as needed  - Allow comfort items, as appropriate  - Provide visual cues, signs, etc.   Outcome: Progressing  Goal: Allow medical examinations, as recommended  Description: Interventions:  - Provide physical/neurological exams and/or referrals, per provider   Outcome: Progressing  Goal: Cooperate with recommended testing/procedures  Description: Interventions:  - Determine need for ancillary testing  - Observe for mental status changes  - Implement falls/precaution protocol   Outcome: Progressing  Goal: Attend and participate in unit activities, including therapeutic, recreational, and educational groups  Description: Interventions:  - Provide therapeutic and educational activities daily, encourage attendance and participation, and document same in the medical record   - Provide appropriate opportunities for reminiscence   - Provide a consistent daily routine   - Encourage family contact/visitation   Outcome:  Progressing  Goal: Complete daily ADLs, including personal hygiene independently, as able  Description: Interventions:  - Observe, teach, and assist patient with ADLS  Outcome: Progressing     Problem: Electroconvulsive therapy (ECT)  Goal: Verbalizes/displays adequate comfort level or baseline comfort level  Description: Interventions:  - Encourage patient to monitor pain and request assistance  - Assess pain using appropriate pain scale  - Administer analgesics based on type and severity of pain and evaluate response  - Implement non-pharmacological measures as appropriate and evaluate response  - Consider cultural and social influences on pain and pain management  - Notify physician/advanced practitioner if interventions unsuccessful or patient reports new pain  Outcome: Progressing  Goal: Achieves stable or improved neurological status  Description: INTERVENTIONS  - Monitor and report changes in neurological status  - Monitor vital signs such as temperature, blood pressure, glucose, and any other labs ordered   - Initiate measures to prevent increased intracranial pressure  - Monitor for seizure activity and implement precautions if appropriate      Outcome: Progressing  Goal: Achieves maximal functionality and self care  Description: INTERVENTIONS  - Monitor swallowing and airway patency with patient fatigue and changes in neurological status  - Encourage and assist patient to increase activity and self care.   - Encourage visually impaired, hearing impaired and aphasic patients to use assistive/communication devices  Outcome: Progressing  Goal: Maintain or return mobility to safest level of function  Description: INTERVENTIONS:  - Assess patient's ability to carry out ADLs; assess patient's baseline for ADL function and identify physical deficits which impact ability to perform ADLs (bathing, care of mouth/teeth, toileting, grooming, dressing, etc.)  - Assess/evaluate cause of self-care deficits   - Assess  range of motion  - Assess patient's mobility  - Assess patient's need for assistive devices and provide as appropriate  - Encourage maximum independence but intervene and supervise when necessary  - Involve family in performance of ADLs  - Assess for home care needs following discharge   - Consider OT consult to assist with ADL evaluation and planning for discharge  - Provide patient education as appropriate  Outcome: Progressing  Goal: Absence of urinary retention  Description: INTERVENTIONS:  - Assess patient’s ability to void and empty bladder  - Monitor I/O  - Bladder scan as needed  - Discuss with physician/AP medications to alleviate retention as needed  - Discuss catheterization for long term situations as appropriate  Outcome: Progressing  Goal: Minimal or absence of nausea and/or vomiting  Description: INTERVENTIONS:  - Administer IV fluids if ordered to ensure adequate hydration  - Maintain NPO status until nausea and vomiting are resolved  - Nasogastric tube if ordered  - Administer ordered antiemetic medications as needed  - Provide nonpharmacologic comfort measures as appropriate  - Advance diet as tolerated, if ordered  - Consider nutrition services referral to assist patient with adequate nutrition and appropriate food choices  Outcome: Progressing  Goal: Maintains adequate nutritional intake  Description: INTERVENTIONS:  - Monitor percentage of each meal consumed  - Identify factors contributing to decreased intake, treat as appropriate  - Assist with meals as needed  - Monitor I&O, weight, and lab values if indicated  - Obtain nutrition services referral as needed  Outcome: Progressing     Problem: DISCHARGE PLANNING - CARE MANAGEMENT  Goal: Discharge to post-acute care or home with appropriate resources  Description: INTERVENTIONS:  - Conduct assessment to determine patient/family and health care team treatment goals, and need for post-acute services based on payer coverage, community resources,  and patient preferences, and barriers to discharge  - Address psychosocial, clinical, and financial barriers to discharge as identified in assessment in conjunction with the patient/family and health care team  - Arrange appropriate level of post-acute services according to patient’s   needs and preference and payer coverage in collaboration with the physician and health care team  - Communicate with and update the patient/family, physician, and health care team regarding progress on the discharge plan  - Arrange appropriate transportation to post-acute venues  Outcome: Progressing

## 2024-08-02 NOTE — PLAN OF CARE
Problem: Ineffective Coping  Goal: Identifies ineffective coping skills  Outcome: Not Progressing  Goal: Identifies healthy coping skills  Outcome: Progressing  Goal: Demonstrates healthy coping skills  Outcome: Not Progressing  Goal: Participates in unit activities  Description: Interventions:  - Provide therapeutic environment   - Provide required programming   - Redirect inappropriate behaviors   Outcome: Progressing   Attended 28/38 of the groups offered during the past 4 days.

## 2024-08-02 NOTE — PROGRESS NOTES
08/02/24 0818   Team Meeting   Meeting Type Daily Rounds   Team Members Present   Team Members Present Physician;Nurse;;Other (Discipline and Name)   Physician Team Member Holter, Thomas   Nursing Team Member Claudia   Social Work Team Member Jose J ORTEGA   Other (Discipline and Name) Jeremias MS   Patient/Family Present   Patient Present No   Patient's Family Present No     Groups Participation  9/11.   Patient's compliant with medications. He's engaged in his treatment. ECT this morning. He reports AH.

## 2024-08-02 NOTE — NURSING NOTE
Pt reports he bit his tongue during ECT and requested PRN lidocaine swish at 1710. Will monitor for effectiveness.

## 2024-08-02 NOTE — NURSING NOTE
Alberto is calm with a blunted affect, scant in conversation. Sits with peers in dayroom for breakfast. Had ECT today, returned with stable vital signs, no headache or pain. Reports mild depression, denies anxiety or SI/HI/AVH. Says he feels paranoid related to ongoing voices telling him they are going to kill him and his family. Sleeping after breakfast, no unmet needs currently.

## 2024-08-02 NOTE — ANESTHESIA PREPROCEDURE EVALUATION
Procedure:  ECT INPATIENT    Relevant Problems   CARDIO   (+) Primary hypertension      GI/HEPATIC   (+) GERD (gastroesophageal reflux disease)        Physical Exam    Airway    Mallampati score: II  TM Distance: >3 FB  Neck ROM: full     Dental       Cardiovascular  Cardiovascular exam normal    Pulmonary  Pulmonary exam normal     Other Findings        Anesthesia Plan  ASA Score- 2     Anesthesia Type- general with ASA Monitors.         Additional Monitors:     Airway Plan:            Plan Factors-Exercise tolerance (METS): >4 METS.    Chart reviewed. EKG reviewed.  Existing labs reviewed. Patient summary reviewed.                  Induction- intravenous.    Postoperative Plan-     Perioperative Resuscitation Plan - Level 1 - Full Code.       Informed Consent- Anesthetic plan and risks discussed with patient.  I personally reviewed this patient with the CRNA. Discussed and agreed on the Anesthesia Plan with the CRNA..      Lab Results   Component Value Date    HGBA1C 5.0 04/01/2024       Lab Results   Component Value Date    K 3.8 06/03/2024    CL 96 06/03/2024    CO2 29 06/03/2024    BUN 10 06/03/2024    CREATININE 1.00 06/03/2024    GLUF 103 (H) 05/29/2024    CALCIUM 10.1 06/03/2024    AST 33 06/03/2024    ALT 72 (H) 06/03/2024    ALKPHOS 80 06/03/2024    EGFR 102 06/03/2024       Lab Results   Component Value Date    WBC 11.33 (H) 08/01/2024    HGB 13.3 08/01/2024    HCT 44.3 08/01/2024    MCV 79 (L) 08/01/2024     08/01/2024     Sinus tachycardia  Nonspecific T wave abnormality  Abnormal ECG  When compared with ECG of 11-MAR-2024 15:41, (unconfirmed)  Sinus rhythm has replaced Ectopic atrial rhythm  QRS axis Shifted left  Confirmed by Angel Lara ([id number]) on 3/11/2024 5:11:48 PM      Specimen Collected: 03/11/24 15:42

## 2024-08-02 NOTE — ANESTHESIA POSTPROCEDURE EVALUATION
Post-Op Assessment Note    CV Status:  Stable  Pain Score: 0    Pain management: adequate       Mental Status:  Sleepy   Hydration Status:  Stable   PONV Controlled:  None   Airway Patency:  Patent     Post Op Vitals Reviewed: Yes    No anethesia notable event occurred.    Staff: CRNA           BP      Temp      Pulse     Resp      SpO2

## 2024-08-02 NOTE — PROCEDURES
Procedure Note - ECT  Alberto Berumen 28 y.o. male MRN: [id number]    Time out was taken with staff to confirm correct patient and correct procedure to be performed.    Session Number: Maintenance    Diagnosis: Principal Problem:    Schizoaffective disorder, bipolar type ([facility])  Active Problems:    GERD (gastroesophageal reflux disease)    Medical clearance for psychiatric admission    Tobacco abuse    T wave inversion in EKG    Chronic idiopathic constipation    Confluent and reticulate papillomatosis    Primary hypertension    Elevated hemoglobin A1c    Bilateral lower extremity edema    ECT Type: Inpatient    Anesthesia: Etomidate    Electrode Placement: bilateral    Energy level: 100%    Seizure Duration     EE sec  EM sec    PSI 43.5%     Results:Clinical seizure was satisfactory, Patient tolerated ECT well    Vitals:    24 06   BP: 140/80   Pulse: 83   Resp: 18   Temp:    SpO2: 95%        Medication Administration - last 24 hours from 2024 0714 to 2024 0714         Date/Time Order Dose Route Action Action by     2024 0910 EDT amLODIPine (NORVASC) tablet 5 mg 5 mg Oral Not Given Linsey Gibbons RN     2024 0911 EDT escitalopram (LEXAPRO) tablet 20 mg 20 mg Oral Given Linsey Gibbons RN     2024 0904 EDT polyethylene glycol (MIRALAX) packet 17 g 17 g Oral Given Linsey Gibbons RN     2024 0605 EDT propranolol (INDERAL) tablet 10 mg 10 mg Oral Given Isabel Lama RN     2024 2115 EDT propranolol (INDERAL) tablet 10 mg 10 mg Oral Given Isabel Lama RN     2024 0911 EDT propranolol (INDERAL) tablet 10 mg 10 mg Oral Given Linsey Gibbons RN     2024 1708 EDT senna-docusate sodium (SENOKOT S) 8.6-50 mg per tablet 2 tablet 2 tablet Oral Given Linsey Gibbons RN     2024 0911 EDT senna-docusate sodium (SENOKOT S) 8.6-50 mg per tablet 2 tablet 2 tablet Oral Given Linsey Gibbons RN     2024 0911 EDT cyanocobalamin (VITAMIN B-12) tablet 1,000 mcg  1,000 mcg Oral Given Linsey Gibbons RN     08/01/2024 2143 EDT nicotine polacrilex (NICORETTE) gum 2 mg 2 mg Oral Given Isabel Lama RN     08/01/2024 1344 EDT nicotine polacrilex (NICORETTE) gum 2 mg 2 mg Oral Given Brooke Sneed LPN     08/01/2024 0929 EDT nicotine polacrilex (NICORETTE) gum 2 mg 2 mg Oral Given Linsey Gibbons RN     08/01/2024 0809 EDT nicotine polacrilex (NICORETTE) gum 2 mg 2 mg Oral Not Given Linsey Gibbons RN     08/01/2024 1051 EDT fluticasone (FLONASE) 50 mcg/act nasal spray 1 spray 1 spray Each Nare Not Given Linsey Gibbons RN     08/01/2024 0911 EDT loratadine (CLARITIN) tablet 10 mg 10 mg Oral Given Linsey Gibbons RN     08/01/2024 0910 EDT hydroCHLOROthiazide tablet 12.5 mg 12.5 mg Oral Given Linsey Gibbons RN     08/01/2024 1708 EDT metFORMIN (GLUCOPHAGE) tablet 500 mg 500 mg Oral Given Linsey Gibbons RN     08/01/2024 0910 EDT metFORMIN (GLUCOPHAGE) tablet 500 mg 500 mg Oral Given Linsey Gibbons RN     08/01/2024 2115 EDT glycopyrrolate (ROBINUL) tablet 1 mg 1 mg Oral Given Isabel Lama RN     08/01/2024 1708 EDT glycopyrrolate (ROBINUL) tablet 1 mg 1 mg Oral Given Linsey Gibbons RN     08/01/2024 0910 EDT glycopyrrolate (ROBINUL) tablet 1 mg 1 mg Oral Given Linsey Gibbons RN     08/01/2024 0911 EDT ARIPiprazole (ABILIFY) tablet 15 mg 15 mg Oral Given Linsey Gibbons RN     08/02/2024 0605 EDT pantoprazole (PROTONIX) EC tablet 20 mg 20 mg Oral Given Isabel Lama RN     08/01/2024 1708 EDT famotidine (PEPCID) tablet 20 mg 20 mg Oral Given Linsey Gibbons RN     08/01/2024 0911 EDT famotidine (PEPCID) tablet 20 mg 20 mg Oral Given Linsey Gibbons RN     08/01/2024 2114 EDT cloZAPine (CLOZARIL) tablet 350 mg 350 mg Oral Given Isabel Lama RN     08/01/2024 1708 EDT cloZAPine (CLOZARIL) tablet 150 mg 150 mg Oral Given Wendy T Laws, RN Jordan Christopher Holter,

## 2024-08-02 NOTE — ANESTHESIA PREPROCEDURE EVALUATION
Procedure:  PRE-OP ONLY    Relevant Problems   CARDIO   (+) Primary hypertension      GI/HEPATIC   (+) GERD (gastroesophageal reflux disease)      Behavioral Health   (+) Schizoaffective disorder, bipolar type (HCC)   (+) Tobacco abuse      Other   (+) Class 2 obesity in adult        Physical Exam    Airway    Mallampati score: II  TM Distance: >3 FB  Neck ROM: full     Dental       Cardiovascular  Cardiovascular exam normal    Pulmonary  Pulmonary exam normal     Other Findings        Anesthesia Plan  ASA Score- 2     Anesthesia Type- general with ASA Monitors.         Additional Monitors:     Airway Plan:            Plan Factors-Exercise tolerance (METS): >4 METS.    Chart reviewed. EKG reviewed.  Existing labs reviewed. Patient summary reviewed.                  Induction- intravenous.    Postoperative Plan-     Perioperative Resuscitation Plan - Level 1 - Full Code.       Informed Consent- Anesthetic plan and risks discussed with patient.  I personally reviewed this patient with the CRNA. Discussed and agreed on the Anesthesia Plan with the CRNA..      Lab Results   Component Value Date    HGBA1C 5.0 04/01/2024       Lab Results   Component Value Date    K 3.8 06/03/2024    CL 96 06/03/2024    CO2 29 06/03/2024    BUN 10 06/03/2024    CREATININE 1.00 06/03/2024    GLUF 103 (H) 05/29/2024    CALCIUM 10.1 06/03/2024    AST 33 06/03/2024    ALT 72 (H) 06/03/2024    ALKPHOS 80 06/03/2024    EGFR 102 06/03/2024       Lab Results   Component Value Date    WBC 12.69 (H) 07/25/2024    HGB 13.6 07/25/2024    HCT 45.3 07/25/2024    MCV 79 (L) 07/25/2024     07/25/2024     Sinus tachycardia  Nonspecific T wave abnormality  Abnormal ECG  When compared with ECG of 11-MAR-2024 15:41, (unconfirmed)  Sinus rhythm has replaced Ectopic atrial rhythm  QRS axis Shifted left  Confirmed by Angel Lara ([id number]) on 3/11/2024 5:11:48 PM      Specimen Collected: 03/11/24 15:42

## 2024-08-03 PROCEDURE — 99232 SBSQ HOSP IP/OBS MODERATE 35: CPT | Performed by: PSYCHIATRY & NEUROLOGY

## 2024-08-03 RX ADMIN — LIDOCAINE HYDROCHLORIDE 15 ML: 20 SOLUTION ORAL at 08:44

## 2024-08-03 RX ADMIN — NICOTINE POLACRILEX 2 MG: 2 GUM, CHEWING ORAL at 14:14

## 2024-08-03 RX ADMIN — GLYCOPYRROLATE 1 MG: 1 TABLET ORAL at 21:28

## 2024-08-03 RX ADMIN — LORATADINE 10 MG: 10 TABLET ORAL at 08:25

## 2024-08-03 RX ADMIN — METFORMIN HYDROCHLORIDE 500 MG: 500 TABLET, FILM COATED ORAL at 08:26

## 2024-08-03 RX ADMIN — CLOZAPINE 350 MG: 100 TABLET ORAL at 21:27

## 2024-08-03 RX ADMIN — CLOZAPINE 150 MG: 100 TABLET ORAL at 17:17

## 2024-08-03 RX ADMIN — METFORMIN HYDROCHLORIDE 500 MG: 500 TABLET, FILM COATED ORAL at 17:18

## 2024-08-03 RX ADMIN — POLYETHYLENE GLYCOL 3350 17 G: 17 POWDER, FOR SOLUTION ORAL at 08:25

## 2024-08-03 RX ADMIN — GLYCOPYRROLATE 1 MG: 1 TABLET ORAL at 17:17

## 2024-08-03 RX ADMIN — GLYCOPYRROLATE 1 MG: 1 TABLET ORAL at 08:26

## 2024-08-03 RX ADMIN — PROPRANOLOL HYDROCHLORIDE 10 MG: 10 TABLET ORAL at 08:25

## 2024-08-03 RX ADMIN — NICOTINE POLACRILEX 2 MG: 2 GUM, CHEWING ORAL at 22:15

## 2024-08-03 RX ADMIN — HYDROCHLOROTHIAZIDE 12.5 MG: 12.5 TABLET ORAL at 08:25

## 2024-08-03 RX ADMIN — AMLODIPINE BESYLATE 5 MG: 5 TABLET ORAL at 08:26

## 2024-08-03 RX ADMIN — PROPRANOLOL HYDROCHLORIDE 10 MG: 10 TABLET ORAL at 21:29

## 2024-08-03 RX ADMIN — NICOTINE POLACRILEX 2 MG: 2 GUM, CHEWING ORAL at 18:14

## 2024-08-03 RX ADMIN — NICOTINE POLACRILEX 2 MG: 2 GUM, CHEWING ORAL at 08:26

## 2024-08-03 RX ADMIN — ARIPIPRAZOLE 15 MG: 15 TABLET ORAL at 08:26

## 2024-08-03 RX ADMIN — FAMOTIDINE 20 MG: 20 TABLET ORAL at 17:18

## 2024-08-03 RX ADMIN — PANTOPRAZOLE SODIUM 20 MG: 20 TABLET, DELAYED RELEASE ORAL at 06:08

## 2024-08-03 RX ADMIN — LIDOCAINE HYDROCHLORIDE 15 ML: 20 SOLUTION ORAL at 17:24

## 2024-08-03 RX ADMIN — SENNOSIDES AND DOCUSATE SODIUM 2 TABLET: 8.6; 5 TABLET ORAL at 08:25

## 2024-08-03 RX ADMIN — FAMOTIDINE 20 MG: 20 TABLET ORAL at 08:26

## 2024-08-03 RX ADMIN — CYANOCOBALAMIN TAB 1000 MCG 1000 MCG: 1000 TAB at 08:25

## 2024-08-03 RX ADMIN — SENNOSIDES AND DOCUSATE SODIUM 2 TABLET: 8.6; 5 TABLET ORAL at 17:18

## 2024-08-03 RX ADMIN — ESCITALOPRAM OXALATE 20 MG: 10 TABLET, FILM COATED ORAL at 08:25

## 2024-08-03 NOTE — NURSING NOTE
Alert, cooperative and visible intermittently. No SI or HI noted. Denies depression, and anxiety. Lidocaine viscous administered @  1724 and was effective. Pt continues to have auditory hallucinations. Attended exercise, open art, and nursing education. Consumed 100% of breakfast and 100% of lunch. Pt noted eating chicken finger that was given to him from fellow female peer. Pt [place] and pt verbalized understanding. Took all medication without prompting. Nicotine gum administered as ordered. Maintained on safe precautions without incident. Will continue to monitor progress and recovery program.

## 2024-08-03 NOTE — PROGRESS NOTES
Psychiatry Progress Note [facility]    Alberto Berumen 28 y.o. male MRN: [id number]  Unit/Bed#: [facility] 108-02 Encounter: [id number]  Code Status: Level 1 - Full Code    PCP: Joce Juan MD    Date of Admission:  3/29/2024 2008   Date of Service:  08/03/24    Patient Active Problem List   Diagnosis    GERD (gastroesophageal reflux disease)    Medical clearance for psychiatric admission    Schizoaffective disorder, bipolar type (HCC)    Tobacco abuse    T wave inversion in EKG    Syringoma    Chronic idiopathic constipation    Vitamin B 12 deficiency    Vitamin D deficiency    Confluent and reticulate papillomatosis    Class 2 obesity in adult    Primary hypertension    Elevated hemoglobin A1c    Bilateral lower extremity edema     Review of systems: Unremarkable but again asking for lidocaine swish for allegedly biting his tongue during ECT  psychiatric diagnosis: Schizoaffective bipolar  assessment  Overall Status: Still with same voices but less often since he got his to weekly maintenance ECT yesterday   certification Statement: The patient will continue to require additional inpatient hospital stay due to ongoing voices that are threatening to mixing lack of response to medications and ECT so far.     Medications: Clozapine 350 mg at bedtime and 150 mg at 4 PM, propranolol 10 mg every 12 hours as as needed for anxiety, Abilify 15 mg mg at bedtime Lexapro 20 mg once a day, atropine eyedrops sublingual for drooling of saliva and senna 2 tablets twice a day for constipation  All medications reviewed and recommend they be continued for symptom management   side effects from treatment: None reported  Medication changes   None today  Medication education   Risks side effects benefits and precautions of medications discussed with patient and he did verbalize an understanding about risks for metabolic syndrome from being on neuroleptics and is form tardive dyskinesia etc. and special precautions  about being on clozapine   Understanding of medications: Has some understanding   Justification for dual anti-psychotics: Not applicable    Non-pharmacological treatments  Continue with individual, group, milieu and occupational therapy using recovery principles and psycho-education about accepting illness and the need for treatment.   to contact aunt and explore ACT team referral which he never had  ECT to be continued  q 2 weekly for maintenance x 6  Refuses to see a dietician and agrees to do more exercises as he is about 100 lbs overweight   Prolactin level high so Risperdal replaced with Abilify which he has tolerated well so far    Safety  Safety and communication plan established to target dynamic risk factors discussed above.    Discharge Plan   Back to his aunt with an ACT team once ECT is completed    Interval Progress   Progressing slowly and happy for the ECT which he claims has helped to lessen the voices which he gets every 2 weeks for maintenance.  Still hears the same voices threatening to kill him and his family but he states ECT have definitely helped him stay out of his head.  Remains isolated as usual with constricted affect but selectively interacting with a female peer.  Less tired in the morning.  Understands he needs to keep appropriate boundaries with female peers.  Still somewhat isolated evasive guarded preoccupied with constricted affect but compliant with medications with no major behavioral issues     acceptance by patient: Accepting  Hopefulness in recovery: Living with his aunt in the [facility]  Involved in reintegration process: Talking with his aunt and the rest of the family  Trusting in relationship with psychiatrist: Appearing to trust  Sleep: Good  Appetite: Good  Compliance with Medications: Good  Group attendance: More than 50%  Significant events and progress towards goals: Continues to report ECTs have been helpful    mental Status Exam  Appearance: age  appropriate, improved grooming, looks older than stated age, overweight, with hair in dreadlocks casually dressed with a clean shaven face, fairly groomed with good eye contact wide-awake found attending art therapy group note reporting tiredness good eye contact   behavior: Still somewhat cold aloof but better mood reactivity responses still minimal  speech: normal rate, normal volume, normal pitch  Mood: dysphoric, anxious, continues to report feeling good with the ECT   affect: constricted, inappropriate, mood-congruent mildly anxious with constricted affect  thought Process: organized, logical, coherent, goal directed, linear, decreased rate of thoughts, slowing of thoughts, negative thinking, impaired abstract reasoning, concrete  Thought Content: paranoid ideation, some paranoia, grandiose ideas, intrusive thoughts, preoccupied, chronic, continues to report paranoia about people threatening to kill him and his family because of the voices.  He believes ECT has helped so that he is not much in his head.  No other delusions elicited.  No current suicidal or homicidal thoughts and no plans verbalized but today reports having passive death wishes because of voices with no plans and able to CFS and it has not changed yet  .  No phobias obsessions compulsions or distorted body perceptions reported.  Preoccupied with wanting to get ECTs more often despite reminding him that it may impair his memory and finally he agreed to such as it is  every 2 weeks  Perceptual Disturbances: Reports voices are less often but they are still threatening to kill him and his family  Hx Risk Factors: chronic psychiatric problems, chronic anxiety symptoms, chronic psychotic symptoms,    Sensorium: Oriented x 3 spheres and situation  Cognition: recent and remote memory grossly intact  Consciousness: alert and awake  Attention: Attention and concentration fair  Intellect: appears to be of average intelligence  Insight: intact  Judgement:  intact  Motor Activity: no abnormal movements     Vitals  Temp:  [97.7 °F (36.5 °C)-97.9 °F (36.6 °C)] 97.7 °F (36.5 °C)  HR:  [] 85  Resp:  [18] 18  BP: (117-133)/(62-78) 133/78  SpO2:  [99 %-100 %] 99 %  No intake or output data in the 24 hours ending 08/03/24 1010                Lab Results: All    Current Facility-Administered Medications   Medication Dose Route Frequency Provider Last Rate    acetaminophen  650 mg Oral Q4H PRN Jordan C Holter, DO      acetaminophen  650 mg Oral Q6H PRN Eveline Hunt, STEVENP      aluminum-magnesium hydroxide-simethicone  30 mL Oral Q4H PRN Jordan C Holter, DO      amLODIPine  5 mg Oral Daily Eveline Hunt, CRNP      ARIPiprazole  15 mg Oral Daily Bora Rosario MD      Artificial Tears  1 drop Both Eyes Q3H PRN Jordan C Holter, DO      atropine  1 drop Sublingual Daily PRN Eveline Hunt CRNP      haloperidol lactate  2.5 mg Intramuscular Q4H PRN Max 4/day Eveline Hunt, CRNP      And    LORazepam  1 mg Intramuscular Q4H PRN Max 4/day Eveline Hunt CRNP      And    benztropine  0.5 mg Intramuscular Q4H PRN Max 4/day Eveline Hunt, CRNP      benztropine  1 mg Intramuscular Q4H PRN Max 6/day Jordan C Holter, DO      haloperidol lactate  5 mg Intramuscular Q4H PRN Max 4/day Eveline Hunt, CRNP      And    LORazepam  2 mg Intramuscular Q4H PRN Max 4/day Eveline Hunt, CRNP      And    benztropine  1 mg Intramuscular Q4H PRN Max 4/day Eveline Miguelitoey, CRNP      benztropine  1 mg Oral Q4H PRN Max 6/day Eveline Hunt, CRNP      benztropine  1 mg Oral Q4H PRN Max 6/day Jordan C Holter, DO      bisacodyl  10 mg Rectal Daily PRN Eveline Hunt, CRNP      calcium carbonate  500 mg Oral BID PRN HOLLI Monge      cloZAPine  150 mg Oral Before Dinner Bora Rosario MD      cloZAPine  350 mg Oral HS Bora Rosario MD      cyanocobalamin  1,000 mcg Oral Daily HOLLI Galvan      hydrOXYzine HCL  50 mg Oral Q6H PRN Max 4/day Jordan C Holter, DO      Or    diphenhydrAMINE  50 mg  Intramuscular Q6H PRN Ion FISCHER Holter, DO      hydrOXYzine HCL  50 mg Oral Q6H PRN Max 4/day HOLLI Lion      Or    diphenhydrAMINE  50 mg Intramuscular Q6H PRN HOLLI Lion      diphenhydrAMINE-zinc acetate   Topical BID PRN HOLLI Lion      escitalopram  20 mg Oral Daily HOLLI Lion      famotidine  20 mg Oral BID Eileen Holliday HOLLI Jensen      fluticasone  1 spray Each Nare Daily Brina Guillen MD      glycopyrrolate  1 mg Oral TID Bora Rosario MD      haloperidol  1 mg Oral Q6H PRN HOLLI Lion      haloperidol  2.5 mg Oral Q4H PRN Max 4/day HOLLI Lion      haloperidol  5 mg Oral Q4H PRN Max 4/day HOLLI Lion      hydroCHLOROthiazide  12.5 mg Oral Daily HOLLI Galvan      hydrocortisone   Topical 4x Daily PRN HOLLI Lion      hydrOXYzine HCL  100 mg Oral Q6H PRN Max 4/day Ion FISCHER Holter, DO      Or    LORazepam  2 mg Intramuscular Q6H PRN Ion  Holter, DO      hydrOXYzine HCL  100 mg Oral Q6H PRN Max 4/day HOLLI Lion      Or    LORazepam  2 mg Intramuscular Q6H PRN HOLLI Lion      hydrOXYzine HCL  25 mg Oral Q6H PRN Max 4/day Ion  Holter, DO      ibuprofen  600 mg Oral Q8H PRN HOLLI Lion      lactated ringers  50 mL/hr Intravenous Continuous Antwan Dong MD      Lidocaine Viscous HCl  15 mL Swish & Spit 4x Daily PRN HOLLI Galvan      loratadine  10 mg Oral Daily Brina Guillen MD      melatonin  3 mg Oral HS PRN Bora Rosario MD      metFORMIN  500 mg Oral BID With Meals HOLLI Galvan      methocarbamol  500 mg Oral Q6H PRN HOLLI Lion      nicotine polacrilex  2 mg Oral Q4H PRN Bora Rosario MD      OLANZapine  5 mg Oral Q4H PRN Max 3/day Ion AALIYAH Holter, DO      Or    OLANZapine  2.5 mg Intramuscular Q4H PRN Max 3/day Ion C Holter, DO      OLANZapine  5 mg Oral Q3H PRN Max 3/day Ion FISCHER Holter,       Or    OLANZapine  5 mg Intramuscular Q3H PRN Max 3/day Ion FISCHER Holter,  DO      OLANZapine  2.5 mg Oral Q4H PRN Max 6/day Jordan C Holter, DO      ondansetron  4 mg Oral Q6H PRN HOLLI Lion      pantoprazole  20 mg Oral Early Morning Eileen GonzalezmiryamjaylenHOLLI      polyethylene glycol  17 g Oral Daily HOLLI Lion      polyethylene glycol  17 g Oral Daily PRN Jordan C Holter, DO      propranolol  10 mg Oral Q12H KAYLIE HOLLI Lion      senna-docusate sodium  1 tablet Oral Daily PRN Jordan C Holter, DO      senna-docusate sodium  2 tablet Oral BID HOLLI Lion      traZODone  50 mg Oral HS PRN HOLLI Lion      white petrolatum-mineral oil   Topical TID PRN HOLLI Lion         Counseling / Coordination of Care: Total floor / unit time spent today 15 minutes. Greater than 50% of total time was spent with the patient and / or family counseling and / or somewhat receptive to supportive listening and teaching positive coping skills to deal with symptom mangement.     Patient's Rights, confidentiality and exceptions to confidentiality, use of automated medical record, [facility] Services staff access to medical record, and consent to treatment reviewed.    This note has been dictated and hence there may be problems with punctuation, spelling and formatting and if anyone has any concerns please address them to Dr. Rosario   This note is not shared with patient due to potential for making patient's condition worse by knowing the content of the note.

## 2024-08-03 NOTE — PLAN OF CARE
Problem: Alteration in Thoughts and Perception  Goal: Agree to be compliant with medication regime, as prescribed and report medication side effects  Description: Interventions:  - Offer appropriate PRN medication and supervise ingestion; conduct AIMS, as needed   Outcome: Progressing  Goal: Attend and participate in unit activities, including therapeutic, recreational, and educational groups  Description: Interventions:  -Encourage Visitation and family involvement in care  Outcome: Progressing     Problem: Ineffective Coping  Goal: Identifies ineffective coping skills  Outcome: Progressing  Goal: Demonstrates healthy coping skills  Outcome: Progressing  Goal: Participates in unit activities  Description: Interventions:  - Provide therapeutic environment   - Provide required programming   - Redirect inappropriate behaviors   Outcome: Progressing     Problem: Alteration in Orientation  Goal: Allow medical examinations, as recommended  Description: Interventions:  - Provide physical/neurological exams and/or referrals, per provider   Outcome: Progressing  Goal: Attend and participate in unit activities, including therapeutic, recreational, and educational groups  Description: Interventions:  - Provide therapeutic and educational activities daily, encourage attendance and participation, and document same in the medical record   - Provide appropriate opportunities for reminiscence   - Provide a consistent daily routine   - Encourage family contact/visitation   Outcome: Progressing     Problem: Electroconvulsive therapy (ECT)  Goal: Treatment Goal: Demonstrate a reduction of confusion and improved orientation to person, place, time and/or situation upon discharge, according to optimum baseline/ability  Outcome: Progressing     Problem: Alteration in Thoughts and Perception  Goal: Refrain from acting on delusional thinking/internal stimuli  Description: Interventions:  - Monitor patient closely, per order   - Utilize  least restrictive measures   - Set reasonable limits, give positive feedback for acceptable   - Administer medications as ordered and monitor of potential side effects  Outcome: Not Progressing     Problem: Alteration in Thoughts and Perception  Goal: Refrain from acting on delusional thinking/internal stimuli  Description: Interventions:  - Monitor patient closely, per order   - Utilize least restrictive measures   - Set reasonable limits, give positive feedback for acceptable   - Administer medications as ordered and monitor of potential side effects  Outcome: Not Progressing

## 2024-08-03 NOTE — NURSING NOTE
Patient slept well throughout the night. Resting comfortably with no signs of distress.Eyes closed and chest movements noted.

## 2024-08-03 NOTE — NURSING NOTE
Alert, cooperative and visible intermittently. Consumed 75% of dinner. Took all medication without prompting. Nicotine gum administered as ordered. Maintained on safe precautions without incident.

## 2024-08-04 PROCEDURE — 99232 SBSQ HOSP IP/OBS MODERATE 35: CPT | Performed by: PSYCHIATRY & NEUROLOGY

## 2024-08-04 RX ADMIN — POLYETHYLENE GLYCOL 3350 17 G: 17 POWDER, FOR SOLUTION ORAL at 08:37

## 2024-08-04 RX ADMIN — NICOTINE POLACRILEX 2 MG: 2 GUM, CHEWING ORAL at 13:13

## 2024-08-04 RX ADMIN — FLUTICASONE PROPIONATE 1 SPRAY: 50 SPRAY, METERED NASAL at 08:39

## 2024-08-04 RX ADMIN — LIDOCAINE HYDROCHLORIDE 15 ML: 20 SOLUTION ORAL at 13:13

## 2024-08-04 RX ADMIN — GLYCOPYRROLATE 1 MG: 1 TABLET ORAL at 17:21

## 2024-08-04 RX ADMIN — NICOTINE POLACRILEX 2 MG: 2 GUM, CHEWING ORAL at 17:21

## 2024-08-04 RX ADMIN — SENNOSIDES AND DOCUSATE SODIUM 2 TABLET: 8.6; 5 TABLET ORAL at 17:21

## 2024-08-04 RX ADMIN — LORATADINE 10 MG: 10 TABLET ORAL at 08:38

## 2024-08-04 RX ADMIN — FAMOTIDINE 20 MG: 20 TABLET ORAL at 08:38

## 2024-08-04 RX ADMIN — GLYCOPYRROLATE 1 MG: 1 TABLET ORAL at 08:38

## 2024-08-04 RX ADMIN — ARIPIPRAZOLE 15 MG: 15 TABLET ORAL at 08:37

## 2024-08-04 RX ADMIN — ESCITALOPRAM OXALATE 20 MG: 10 TABLET, FILM COATED ORAL at 08:37

## 2024-08-04 RX ADMIN — SENNOSIDES AND DOCUSATE SODIUM 2 TABLET: 8.6; 5 TABLET ORAL at 08:38

## 2024-08-04 RX ADMIN — HYDROCHLOROTHIAZIDE 12.5 MG: 12.5 TABLET ORAL at 08:38

## 2024-08-04 RX ADMIN — PROPRANOLOL HYDROCHLORIDE 10 MG: 10 TABLET ORAL at 21:18

## 2024-08-04 RX ADMIN — NICOTINE POLACRILEX 2 MG: 2 GUM, CHEWING ORAL at 08:38

## 2024-08-04 RX ADMIN — GLYCOPYRROLATE 1 MG: 1 TABLET ORAL at 21:19

## 2024-08-04 RX ADMIN — FAMOTIDINE 20 MG: 20 TABLET ORAL at 17:21

## 2024-08-04 RX ADMIN — PANTOPRAZOLE SODIUM 20 MG: 20 TABLET, DELAYED RELEASE ORAL at 06:20

## 2024-08-04 RX ADMIN — NICOTINE POLACRILEX 2 MG: 2 GUM, CHEWING ORAL at 21:18

## 2024-08-04 RX ADMIN — LIDOCAINE HYDROCHLORIDE 15 ML: 20 SOLUTION ORAL at 08:38

## 2024-08-04 RX ADMIN — CYANOCOBALAMIN TAB 1000 MCG 1000 MCG: 1000 TAB at 08:38

## 2024-08-04 RX ADMIN — CLOZAPINE 350 MG: 100 TABLET ORAL at 21:18

## 2024-08-04 RX ADMIN — METFORMIN HYDROCHLORIDE 500 MG: 500 TABLET, FILM COATED ORAL at 17:22

## 2024-08-04 RX ADMIN — METFORMIN HYDROCHLORIDE 500 MG: 500 TABLET, FILM COATED ORAL at 08:38

## 2024-08-04 RX ADMIN — CLOZAPINE 150 MG: 100 TABLET ORAL at 17:21

## 2024-08-04 NOTE — PLAN OF CARE
Problem: Alteration in Thoughts and Perception  Goal: Treatment Goal: Gain control of psychotic behaviors/thinking, reduce/eliminate presenting symptoms and demonstrate improved reality functioning upon discharge  Outcome: Progressing  Goal: Verbalize thoughts and feelings  Description: Interventions:  - Promote a nonjudgmental and trusting relationship with the patient through active listening and therapeutic communication  - Assess patient's level of functioning, behavior and potential for risk  - Engage patient in 1 on 1 interactions  - Encourage patient to express fears, feelings, frustrations, and discuss symptoms    - [place] patient to reality, help patient recognize reality-based thinking   - Administer medications as ordered and assess for potential side effects  - Provide the patient education related to the signs and symptoms of the illness and desired effects of prescribed medications  Outcome: Progressing  Goal: Agree to be compliant with medication regime, as prescribed and report medication side effects  Description: Interventions:  - Offer appropriate PRN medication and supervise ingestion; conduct AIMS, as needed   Outcome: Progressing  Goal: Attend and participate in unit activities, including therapeutic, recreational, and educational groups  Description: Interventions:  -Encourage Visitation and family involvement in care  Outcome: Progressing  Goal: Complete daily ADLs, including personal hygiene independently, as able  Description: Interventions:  - Observe, teach, and assist patient with ADLS  - Monitor and promote a balance of rest/activity, with adequate nutrition and elimination   Outcome: Progressing     Problem: Ineffective Coping  Goal: Participates in unit activities  Description: Interventions:  - Provide therapeutic environment   - Provide required programming   - Redirect inappropriate behaviors   Outcome: Progressing     Problem: Depression  Goal: Refrain from harming  self  Description: Interventions:  - Monitor patient closely, per order   - Supervise medication ingestion, monitor effects and side effects   Outcome: Progressing  Goal: Refrain from isolation  Description: Interventions:  - Develop a trusting relationship   - Encourage socialization   Outcome: Progressing  Goal: Refrain from self-neglect  Outcome: Progressing     Problem: Alteration in Orientation  Goal: Interact with staff daily  Description: Interventions:  - Assess and re-assess patient's level of orientation  - Engage patient in 1 on 1 interactions, daily, for a minimum of 15 minutes   - Establish rapport/trust with patient   Outcome: Progressing  Goal: Express concerns related to confused thinking related to:  Description: Interventions:  - Encourage patient to express feelings, fears, frustrations, hopes  - Assign consistent caregivers   - [place]/re-orient patient as needed  - Allow comfort items, as appropriate  - Provide visual cues, signs, etc.   Outcome: Progressing  Goal: Cooperate with recommended testing/procedures  Description: Interventions:  - Determine need for ancillary testing  - Observe for mental status changes  - Implement falls/precaution protocol   Outcome: Progressing

## 2024-08-04 NOTE — NURSING NOTE
Patient is visible and pleasant  Behaviors controlled and appropriate.  Sat with select female peer all evening. Calm and cooperative with staff. Medication compliant as well. Offered no complaints.

## 2024-08-04 NOTE — NURSING NOTE
Alert, cooperative and visible intermittently. Consumed 75% of dinner. Took all medication without prompting. Maintained on safe precautions without incident.

## 2024-08-04 NOTE — PROGRESS NOTES
Psychiatry Progress Note [facility]    Alberto Berumen 28 y.o. male MRN: [id number]  Unit/Bed#: [facility] 108-02 Encounter: [id number]  Code Status: Level 1 - Full Code    PCP: Joce Juan MD    Date of Admission:  3/29/2024 2008   Date of Service:  08/04/24    Patient Active Problem List   Diagnosis    GERD (gastroesophageal reflux disease)    Medical clearance for psychiatric admission    Schizoaffective disorder, bipolar type (HCC)    Tobacco abuse    T wave inversion in EKG    Syringoma    Chronic idiopathic constipation    Vitamin B 12 deficiency    Vitamin D deficiency    Confluent and reticulate papillomatosis    Class 2 obesity in adult    Primary hypertension    Elevated hemoglobin A1c    Bilateral lower extremity edema     Review of systems: Again asking for lidocaine swish for allegedly biting his tongue during ECT otherwise unremarkable   assessment  Overall Status: No significant changes with same voices as before that are threatening him   certification Statement: The patient will continue to require additional inpatient hospital stay due to ongoing voices that are threatening to mixing lack of response to medications and ECT so far.     Medications: Clozapine 350 mg at bedtime and 150 mg at 4 PM, propranolol 10 mg every 12 hours as as needed for anxiety, Abilify 15 mg mg at bedtime Lexapro 20 mg once a day, atropine eyedrops sublingual for drooling of saliva and senna 2 tablets twice a day for constipation  All medications reviewed and recommend they be continued for symptom management   side effects from treatment: None reported  Medication changes   None today  Medication education   Risks side effects benefits and precautions of medications discussed with patient and he did verbalize an understanding about risks for metabolic syndrome from being on neuroleptics and is form tardive dyskinesia etc. and special precautions about being on clozapine   Understanding of medications:  Has some understanding   Justification for dual anti-psychotics: Not applicable    Non-pharmacological treatments  Continue with individual, group, milieu and occupational therapy using recovery principles and psycho-education about accepting illness and the need for treatment.   to contact aunt and explore ACT team referral which he never had  ECT to be continued  q 2 weekly for maintenance x 6  Refuses to see a dietician and agrees to do more exercises as he is about 100 lbs overweight   Prolactin level high so Risperdal replaced with Abilify which he has tolerated well so far    Safety  Safety and communication plan established to target dynamic risk factors discussed above.    Discharge Plan   Back to his aunt with an ACT team once ECT is completed    Interval Progress   Continues to report ECTs have definitely helped in decreasing the voices that are still threatening to kill him and his family but he continues to insist that help him stay out of his head.  Voices have not been commanding at all.  He has not had any suicidal thoughts. The voices still make him feel depressed but able to contract for safety.  Still somewhat isolated interacting with [facility] peers especially feelings of guilt and continues to have a constricted affect when approached.  He understands the need to keep appropriate boundaries with female peers.  Still somewhat evasive guarded internally preoccupied with constricted affect wearing hospital clothing but compliant with medications with no major behavioral issues   acceptance by patient: Accepting  Hopefulness in recovery: Living with his aunt in [facility]  Involved in reintegration process: Talking with his aunt and also his siblings and other aunts  Trusting in relationship with psychiatrist: Appearing to trust  Sleep: Good  Appetite: Good  Compliance with Medications: Good  Group attendance: More than 50%  Significant events and progress towards goals: Continues to report  ECTs have been helpful and still with same voices as before    mental Status Exam  Appearance: age appropriate, improved grooming, looks older than stated age, overweight, with hair in dreadlocks casually dressed with a clean shaven face, fairly groomed with good eye contact no longer sleepy wide-awake pacing the hallway casually dressed   behavior: Still somewhat cold and aloof but better mood reactivity but responses are still minimal  speech: normal rate, normal volume, normal pitch  Mood: dysphoric, anxious, continues to report feeling good with the ECT   affect: constricted, inappropriate, mood-congruent mildly anxious with constricted affect  thought Process: organized, logical, coherent, goal directed, linear, decreased rate of thoughts, slowing of thoughts, negative thinking, impaired abstract reasoning, concrete  Thought Content: paranoid ideation, some paranoia, grandiose ideas, intrusive thoughts, preoccupied, chronic, continues to report paranoia about people threatening to kill him and his family because of the voices.  He believes ECT has helped so that he is not much in his head.  No other delusions elicited.  No current suicidal or homicidal thoughts and no plans verbalized but today reports having passive death wishes because of voices with no plans and able to CFS and it has not changed yet  .  No phobias obsessions compulsions or distorted body perceptions reported.  Preoccupied with wanting to get ECTs more often despite reminding him that it may impair his memory and finally he agreed to such as it is  every 2 weeks  Perceptual Disturbances: Reports voices are less often but they are still threatening to kill him and his family   Hx Risk Factors: chronic psychiatric problems, chronic anxiety symptoms, chronic psychotic symptoms,    Sensorium: Oriented x 3 spheres and situation  Cognition: recent and remote memory grossly intact  Consciousness: alert and awake  Attention: Attention and  concentration are fair  Intellect: appears to be of average intelligence  Insight: intact  Judgement: intact  Motor Activity: no abnormal movements     Vitals  Temp:  [97.6 °F (36.4 °C)-97.9 °F (36.6 °C)] 97.6 °F (36.4 °C)  HR:  [] 79  Resp:  [18-20] 18  BP: (115-151)/(73-80) 115/75  SpO2:  [96 %-100 %] 96 %  No intake or output data in the 24 hours ending 08/04/24 0850                Lab Results: All    Current Facility-Administered Medications   Medication Dose Route Frequency Provider Last Rate    acetaminophen  650 mg Oral Q4H PRN Jordan C Holter, DO      acetaminophen  650 mg Oral Q6H PRN HOLLI Lion      aluminum-magnesium hydroxide-simethicone  30 mL Oral Q4H PRN Jordan C Holter, DO      amLODIPine  5 mg Oral Daily STEVE LionNP      ARIPiprazole  15 mg Oral Daily Bora Rosario MD      Artificial Tears  1 drop Both Eyes Q3H PRN Jordan C Holter, DO      atropine  1 drop Sublingual Daily PRN HOLLI Lion      haloperidol lactate  2.5 mg Intramuscular Q4H PRN Max 4/day STEVE LionNP      And    LORazepam  1 mg Intramuscular Q4H PRN Max 4/day STEVE LionNP      And    benztropine  0.5 mg Intramuscular Q4H PRN Max 4/day STEVE LionNP      benztropine  1 mg Intramuscular Q4H PRN Max 6/day Jordan C Holter, DO      haloperidol lactate  5 mg Intramuscular Q4H PRN Max 4/day STEVE LionNP      And    LORazepam  2 mg Intramuscular Q4H PRN Max 4/day STEVE LionNP      And    benztropine  1 mg Intramuscular Q4H PRN Max 4/day HOLLI Lion      benztropine  1 mg Oral Q4H PRN Max 6/day HOLLI Lion      benztropine  1 mg Oral Q4H PRN Max 6/day Jordan C Holter,       bisacodyl  10 mg Rectal Daily PRN HOLLI Lion      calcium carbonate  500 mg Oral BID PRN HOLLI Monge      cloZAPine  150 mg Oral Before Dinner Bora Rosario MD      cloZAPine  350 mg Oral HS Bora Rosario MD      cyanocobalamin  1,000 mcg Oral Daily HOLLI Galvan       hydrOXYzine HCL  50 mg Oral Q6H PRN Max 4/day Ion FISCHER Holter, DO      Or    diphenhydrAMINE  50 mg Intramuscular Q6H PRN Ion FISCHER Holter, DO      hydrOXYzine HCL  50 mg Oral Q6H PRN Max 4/day HOLLI Lion      Or    diphenhydrAMINE  50 mg Intramuscular Q6H PRN HOLLI Lion      diphenhydrAMINE-zinc acetate   Topical BID PRN HOLLI Lion      escitalopram  20 mg Oral Daily HOLLI Lion      famotidine  20 mg Oral BID Eileen Holliday HOLLI Jensen      fluticasone  1 spray Each Nare Daily Brina Guillen MD      glycopyrrolate  1 mg Oral TID Bora Rosario MD      haloperidol  1 mg Oral Q6H PRN HOLLI Lion      haloperidol  2.5 mg Oral Q4H PRN Max 4/day HOLLI Lion      haloperidol  5 mg Oral Q4H PRN Max 4/day HOLLI Lion      hydroCHLOROthiazide  12.5 mg Oral Daily HOLLI Galvan      hydrocortisone   Topical 4x Daily PRN HOLLI Lion      hydrOXYzine HCL  100 mg Oral Q6H PRN Max 4/day Ion Dupontter, DO      Or    LORazepam  2 mg Intramuscular Q6H PRN Ion Dupontter, DO      hydrOXYzine HCL  100 mg Oral Q6H PRN Max 4/day HOLLI Lion      Or    LORazepam  2 mg Intramuscular Q6H PRN HOLLI Lion      hydrOXYzine HCL  25 mg Oral Q6H PRN Max 4/day Ion FISCHER Holter, DO      ibuprofen  600 mg Oral Q8H PRN HOLLI Lion      lactated ringers  50 mL/hr Intravenous Continuous Antwan Dong MD      Lidocaine Viscous HCl  15 mL Swish & Spit 4x Daily PRN HOLLI Galvan      loratadine  10 mg Oral Daily Brina Guillen MD      melatonin  3 mg Oral HS PRN Bora Rosario MD      metFORMIN  500 mg Oral BID With Meals HOLLI Galvan      methocarbamol  500 mg Oral Q6H PRN HOLLI Lion      nicotine polacrilex  2 mg Oral Q4H PRN Bora Rosario MD      OLANZapine  5 mg Oral Q4H PRN Max 3/day Ion C Holter, DO      Or    OLANZapine  2.5 mg Intramuscular Q4H PRN Max 3/day Ion FISCHER Holter, DO      OLANZapine  5 mg Oral Q3H PRN  Max 3/day Jordan C Holter, DO      Or    OLANZapine  5 mg Intramuscular Q3H PRN Max 3/day Jordan C Holter, DO      OLANZapine  2.5 mg Oral Q4H PRN Max 6/day Jordan C Holter, DO      ondansetron  4 mg Oral Q6H PRN HOLLI Lion      pantoprazole  20 mg Oral Early Morning Eileen Jensen, HOLLI      polyethylene glycol  17 g Oral Daily HOLLI Lion      polyethylene glycol  17 g Oral Daily PRN Jordan C Holter, DO      propranolol  10 mg Oral Q12H KAYLIE HOLLI Lion      senna-docusate sodium  1 tablet Oral Daily PRN Jordan C Holter,       senna-docusate sodium  2 tablet Oral BID HOLLI Lion      traZODone  50 mg Oral HS PRN HOLLI Lion      white petrolatum-mineral oil   Topical TID PRN OHLLI Lion         Counseling / Coordination of Care: Total floor / unit time spent today 15 minutes. Greater than 50% of total time was spent with the patient and / or family counseling and / or somewhat receptive to supportive listening and teaching positive coping skills to deal with symptom mangement.     Patient's Rights, confidentiality and exceptions to confidentiality, use of automated medical record, [facility] staff access to medical record, and consent to treatment reviewed.    This note has been dictated and hence there may be problems with punctuation, spelling and formatting and if anyone has any concerns please address them to Dr. Rosario   This note is not shared with patient due to potential for making patient's condition worse by knowing the content of the note.

## 2024-08-04 NOTE — NURSING NOTE
Alert, cooperative and visible intermittently. No SI or HI noted. Denies depression, anxiety and pain. Pt continues with auditory hallucinations. Attended coping skills. Consumed 100% of breakfast and 75% of lunch. Took all medication without prompting. Maintained on safe precautions without incident. Will continue to monitor progress and recovery program.

## 2024-08-05 PROCEDURE — 99232 SBSQ HOSP IP/OBS MODERATE 35: CPT | Performed by: PSYCHIATRY & NEUROLOGY

## 2024-08-05 RX ADMIN — PROPRANOLOL HYDROCHLORIDE 10 MG: 10 TABLET ORAL at 08:59

## 2024-08-05 RX ADMIN — GLYCOPYRROLATE 1 MG: 1 TABLET ORAL at 17:04

## 2024-08-05 RX ADMIN — AMLODIPINE BESYLATE 5 MG: 5 TABLET ORAL at 08:59

## 2024-08-05 RX ADMIN — CLOZAPINE 150 MG: 100 TABLET ORAL at 17:03

## 2024-08-05 RX ADMIN — CYANOCOBALAMIN TAB 1000 MCG 1000 MCG: 1000 TAB at 09:00

## 2024-08-05 RX ADMIN — SENNOSIDES AND DOCUSATE SODIUM 2 TABLET: 8.6; 5 TABLET ORAL at 09:00

## 2024-08-05 RX ADMIN — CLOZAPINE 350 MG: 100 TABLET ORAL at 21:11

## 2024-08-05 RX ADMIN — FAMOTIDINE 20 MG: 20 TABLET ORAL at 17:04

## 2024-08-05 RX ADMIN — POLYETHYLENE GLYCOL 3350 17 G: 17 POWDER, FOR SOLUTION ORAL at 08:58

## 2024-08-05 RX ADMIN — ESCITALOPRAM OXALATE 20 MG: 10 TABLET, FILM COATED ORAL at 08:59

## 2024-08-05 RX ADMIN — PROPRANOLOL HYDROCHLORIDE 10 MG: 10 TABLET ORAL at 21:11

## 2024-08-05 RX ADMIN — GLYCOPYRROLATE 1 MG: 1 TABLET ORAL at 09:00

## 2024-08-05 RX ADMIN — METFORMIN HYDROCHLORIDE 500 MG: 500 TABLET, FILM COATED ORAL at 09:00

## 2024-08-05 RX ADMIN — GLYCOPYRROLATE 1 MG: 1 TABLET ORAL at 21:11

## 2024-08-05 RX ADMIN — NICOTINE POLACRILEX 2 MG: 2 GUM, CHEWING ORAL at 17:03

## 2024-08-05 RX ADMIN — PANTOPRAZOLE SODIUM 20 MG: 20 TABLET, DELAYED RELEASE ORAL at 06:34

## 2024-08-05 RX ADMIN — ARIPIPRAZOLE 15 MG: 15 TABLET ORAL at 09:01

## 2024-08-05 RX ADMIN — NICOTINE POLACRILEX 2 MG: 2 GUM, CHEWING ORAL at 08:58

## 2024-08-05 RX ADMIN — METFORMIN HYDROCHLORIDE 500 MG: 500 TABLET, FILM COATED ORAL at 17:04

## 2024-08-05 RX ADMIN — FLUTICASONE PROPIONATE 1 SPRAY: 50 SPRAY, METERED NASAL at 08:58

## 2024-08-05 RX ADMIN — LORATADINE 10 MG: 10 TABLET ORAL at 09:01

## 2024-08-05 RX ADMIN — SENNOSIDES AND DOCUSATE SODIUM 2 TABLET: 8.6; 5 TABLET ORAL at 17:04

## 2024-08-05 RX ADMIN — FAMOTIDINE 20 MG: 20 TABLET ORAL at 09:00

## 2024-08-05 RX ADMIN — NICOTINE POLACRILEX 2 MG: 2 GUM, CHEWING ORAL at 21:11

## 2024-08-05 RX ADMIN — HYDROCHLOROTHIAZIDE 12.5 MG: 12.5 TABLET ORAL at 09:00

## 2024-08-05 NOTE — PROGRESS NOTES
08/05/24 0818   Team Meeting   Meeting Type Daily Rounds   Team Members Present   Team Members Present Physician;Nurse;;Other (Discipline and Name)   Patient/Family Present   Patient Present No   Patient's Family Present No     In attendance:  Dr. Alex Thomas, MD Dr. Jordan Holter, DO Wanda Marte, RN  [facility]isana Alvarado, [facility]  Carla Lux, [facility]  MATILDA Daniel.S.    Groups: 2/7    Pt visible, social. Completed ECT Friday; next ECT 8/16. Pt group attendance low due to ECT Friday. Pt remains appropriate with female peers.

## 2024-08-05 NOTE — PLAN OF CARE
Problem: Alteration in Orientation  Goal: Interact with staff daily  Description: Interventions:  - Assess and re-assess patient's level of orientation  - Engage patient in 1 on 1 interactions, daily, for a minimum of 15 minutes   - Establish rapport/trust with patient   Outcome: Progressing  Goal: Express concerns related to confused thinking related to:  Description: Interventions:  - Encourage patient to express feelings, fears, frustrations, hopes  - Assign consistent caregivers   - [place]/re-orient patient as needed  - Allow comfort items, as appropriate  - Provide visual cues, signs, etc.   Outcome: Progressing  Goal: Cooperate with recommended testing/procedures  Description: Interventions:  - Determine need for ancillary testing  - Observe for mental status changes  - Implement falls/precaution protocol   Outcome: Progressing  Goal: Attend and participate in unit activities, including therapeutic, recreational, and educational groups  Description: Interventions:  - Provide therapeutic and educational activities daily, encourage attendance and participation, and document same in the medical record   - Provide appropriate opportunities for reminiscence   - Provide a consistent daily routine   - Encourage family contact/visitation   Outcome: Progressing     Problem: Electroconvulsive therapy (ECT)  Goal: Verbalizes/displays adequate comfort level or baseline comfort level  Description: Interventions:  - Encourage patient to monitor pain and request assistance  - Assess pain using appropriate pain scale  - Administer analgesics based on type and severity of pain and evaluate response  - Implement non-pharmacological measures as appropriate and evaluate response  - Consider cultural and social influences on pain and pain management  - Notify physician/advanced practitioner if interventions unsuccessful or patient reports new pain  Outcome: Progressing  Goal: Achieves stable or improved neurological  status  Description: INTERVENTIONS  - Monitor and report changes in neurological status  - Monitor vital signs such as temperature, blood pressure, glucose, and any other labs ordered   - Initiate measures to prevent increased intracranial pressure  - Monitor for seizure activity and implement precautions if appropriate      Outcome: Progressing  Goal: Achieves maximal functionality and self care  Description: INTERVENTIONS  - Monitor swallowing and airway patency with patient fatigue and changes in neurological status  - Encourage and assist patient to increase activity and self care.   - Encourage visually impaired, hearing impaired and aphasic patients to use assistive/communication devices  Outcome: Progressing  Goal: Absence of urinary retention  Description: INTERVENTIONS:  - Assess patient’s ability to void and empty bladder  - Monitor I/O  - Bladder scan as needed  - Discuss with physician/AP medications to alleviate retention as needed  - Discuss catheterization for long term situations as appropriate  Outcome: Progressing

## 2024-08-05 NOTE — SOCIAL WORK
BRANDY placed call to pt's sister Marleny at his request. Marleny reports she would like to come see pt in person Wednesday 8/7 at 11am. BRANDY will set up visit if approved by tx team.

## 2024-08-05 NOTE — PLAN OF CARE
Problem: Ineffective Coping  Goal: Identifies ineffective coping skills  Outcome: Progressing  Goal: Identifies healthy coping skills  Outcome: Progressing  Goal: Demonstrates healthy coping skills  Outcome: Progressing  Goal: Participates in unit activities  Description: Interventions:  - Provide therapeutic environment   - Provide required programming   - Redirect inappropriate behaviors   Outcome: Progressing   Attended 30/45 of the groups offered last week.

## 2024-08-05 NOTE — PROGRESS NOTES
Psychiatry Progress Note [facility]    Alberto Berumen 28 y.o. male MRN: [id number]  Unit/Bed#: [facility] 108-02 Encounter: [id number]  Code Status: Level 1 - Full Code    PCP: Joce Juan MD    Date of Admission:  3/29/2024 2008   Date of Service:  08/05/24    Patient Active Problem List   Diagnosis    GERD (gastroesophageal reflux disease)    Medical clearance for psychiatric admission    Schizoaffective disorder, bipolar type (HCC)    Tobacco abuse    T wave inversion in EKG    Syringoma    Chronic idiopathic constipation    Vitamin B 12 deficiency    Vitamin D deficiency    Confluent and reticulate papillomatosis    Class 2 obesity in adult    Primary hypertension    Elevated hemoglobin A1c    Bilateral lower extremity edema     Review of systems: Unremarkable   assessment  Overall Status: No significant changes with same voices that are threatening   certification Statement: The patient will continue to require additional inpatient hospital stay due to ongoing voices that are threatening to mixing lack of response to medications and ECT so far.     Medications: Clozapine 350 mg at bedtime and 150 mg at 4 PM, propranolol 10 mg every 12 hours as as needed for anxiety, Abilify 15 mg mg at bedtime Lexapro 20 mg once a day, atropine eyedrops sublingual for drooling of saliva and senna 2 tablets twice a day for constipation  All medications reviewed and recommend they be continued for symptom management  side effects from treatment: None reported  Medication changes   None today  Medication education   Risks side effects benefits and precautions of medications discussed with patient and he did verbalize an understanding about risks for metabolic syndrome from being on neuroleptics and is form tardive dyskinesia etc. and special precautions about being on clozapine   Understanding of medications: Has some understanding   Justification for dual anti-psychotics: Not applicable    Non-pharmacological  treatments  Continue with individual, group, milieu and occupational therapy using recovery principles and psycho-education about accepting illness and the need for treatment.   to contact aunt and explore ACT team referral which he never had  ECT to be continued  q 2 weekly for maintenance x 6  Refuses to see a dietician and agrees to do more exercises as he is about 100 lbs overweight   Prolactin level high so Risperdal replaced with Abilify which he has tolerated well so far    Safety  Safety and communication plan established to target dynamic risk factors discussed above.    Discharge Plan   Back to his aunt with an ACT team once ECT is completed    Interval Progress   Patient continues to report ECTs have definitely helped in decreasing the voices that are still threatening to kill him and his family and he continues to claim that they help him stay out of his head and are markedly less.  They have not been commanding at all and makes him feel somewhat anxious and depressed but not to the point of suicide and able to contract for safety.  Interacting with select peers especially a female peer and knows he needs to keep appropriate boundaries with her.  Still somewhat internally preoccupied with a constricted affect wearing hospital clothing but compliant with medications with no major behavioral issues or problems and is more visible in the mornings and not skipping breakfast and is easily aroused in the morning since the med changes last week   acceptance by patient: Accepting  Hopefulness in recovery: Living with his aunt in the progress  Involved in reintegration process: Talking with his aunt and his siblings and other aunts who live in [place]  Trusting in relationship with psychiatrist: Appearing to trust  Sleep: Good  Appetite: Good  Compliance with Medications: Good  Group attendance: More than 50% but 2/7 after ECT last Friday  Significant events and progress towards goals: Reports  ECTs have been helpful and still with same voices    mental Status Exam  Appearance: age appropriate, improved grooming, looks older than stated age, overweight, with hair in dreadlocks casually dressed with a clean shaven face, fairly groomed with good eye contact  casually dressed, no longer sleepy , attended team meeting today   behavior: still somewhat cold and aloof but redirectable  speech: normal rate, normal volume, normal pitch  Mood: dysphoric, anxious, continues to report feeling good with the ECT   affect: constricted, inappropriate, mood-congruent constricted affect   thought Process: organized, logical, coherent, goal directed, linear, decreased rate of thoughts, slowing of thoughts, negative thinking, impaired abstract reasoning, concrete  Thought Content: paranoid ideation, some paranoia, grandiose ideas, intrusive thoughts, preoccupied, chronic, continues to report paranoia about people threatening to kill him and his family because of the voices.  He believes ECT has helped so that he is not much in his head.  No other delusions elicited.  No current suicidal or homicidal thoughts and no plans verbalized but today reports having passive death wishes because of voices with no plans and able to CFS and it has not changed yet  .  No phobias obsessions compulsions or distorted body perceptions reported.  Preoccupied with wanting to get ECTs more often despite reminding him that it may impair his memory and finally he agreed to such as it is  every 2 weeks  Perceptual Disturbances: still with same threatening voices to kill him and his famil and tells me they emilia far less often   Hx Risk Factors: chronic psychiatric problems, chronic anxiety symptoms, chronic psychotic symptoms,    Sensorium: oriented x 3 spheres and situation   Cognition: recent and remote memory grossly intact  Consciousness: alert and awake  Attention: attention and concentration fair   Intellect: appears to be of average  intelligence  Insight: intact  Judgement: intact  Motor Activity: no abnormal movements     Vitals  Temp:  [97.6 °F (36.4 °C)-97.7 °F (36.5 °C)] 97.7 °F (36.5 °C)  HR:  [] 104  Resp:  [18] 18  BP: (115-133)/(75-80) 124/80  SpO2:  [96 %-98 %] 98 %  No intake or output data in the 24 hours ending 08/05/24 0510                Lab Results: All    Current Facility-Administered Medications   Medication Dose Route Frequency Provider Last Rate    acetaminophen  650 mg Oral Q4H PRN Jordan C Holter, DO      acetaminophen  650 mg Oral Q6H PRN HOLLI Lion      aluminum-magnesium hydroxide-simethicone  30 mL Oral Q4H PRN Jordan C Holter, DO      amLODIPine  5 mg Oral Daily Eveline Hunt CRNP      ARIPiprazole  15 mg Oral Daily Bora Rosario MD      Artificial Tears  1 drop Both Eyes Q3H PRN Jordan C Holter, DO      atropine  1 drop Sublingual Daily PRN STEVE LionNP      haloperidol lactate  2.5 mg Intramuscular Q4H PRN Max 4/day Eveline Hunt, CRNP      And    LORazepam  1 mg Intramuscular Q4H PRN Max 4/day STEVE LionNP      And    benztropine  0.5 mg Intramuscular Q4H PRN Max 4/day Eveline Hunt, CRNP      benztropine  1 mg Intramuscular Q4H PRN Max 6/day Jordan C Holter, DO      haloperidol lactate  5 mg Intramuscular Q4H PRN Max 4/day Eveline Hunt, CRNP      And    LORazepam  2 mg Intramuscular Q4H PRN Max 4/day Eveline Hunt CRNP      And    benztropine  1 mg Intramuscular Q4H PRN Max 4/day Evelinevipul Hunt, CRNP      benztropine  1 mg Oral Q4H PRN Max 6/day Eveline Hunt, STEVENP      benztropine  1 mg Oral Q4H PRN Max 6/day Jordan C Holter, DO      bisacodyl  10 mg Rectal Daily PRN Eveline Hunt, HOLLI      calcium carbonate  500 mg Oral BID PRN HOLLI Monge      cloZAPine  150 mg Oral Before Dinner Bora Rosario MD      cloZAPine  350 mg Oral HS Bora Rosario MD      cyanocobalamin  1,000 mcg Oral Daily HOLLI Galvan      hydrOXYzine HCL  50 mg Oral Q6H PRN Max 4/day Ion FISCHER  Holter, DO      Or    diphenhydrAMINE  50 mg Intramuscular Q6H PRN Ion FISCHER Holter, DO      hydrOXYzine HCL  50 mg Oral Q6H PRN Max 4/day HOLLI Lion      Or    diphenhydrAMINE  50 mg Intramuscular Q6H PRN HOLLI Lion      diphenhydrAMINE-zinc acetate   Topical BID PRN HOLLI Lion      escitalopram  20 mg Oral Daily HOLLI Lion      famotidine  20 mg Oral BID Eileen CherryHOLLI Jimenez      fluticasone  1 spray Each Nare Daily Brina Guillen MD      glycopyrrolate  1 mg Oral TID Bora Rosario MD      haloperidol  1 mg Oral Q6H PRN HOLLI Lion      haloperidol  2.5 mg Oral Q4H PRN Max 4/day HOLLI Lion      haloperidol  5 mg Oral Q4H PRN Max 4/day HOLLI Lion      hydroCHLOROthiazide  12.5 mg Oral Daily HOLLI Galvan      hydrocortisone   Topical 4x Daily PRN HOLLI Lion      hydrOXYzine HCL  100 mg Oral Q6H PRN Max 4/day Jordan C Holter, DO      Or    LORazepam  2 mg Intramuscular Q6H PRN Jordan C Holter, DO      hydrOXYzine HCL  100 mg Oral Q6H PRN Max 4/day HOLLI Lion      Or    LORazepam  2 mg Intramuscular Q6H PRN HOLLI Lion      hydrOXYzine HCL  25 mg Oral Q6H PRN Max 4/day Jordan C Holter, DO      ibuprofen  600 mg Oral Q8H PRN HOLLI Lion      lactated ringers  50 mL/hr Intravenous Continuous Antwan Dong MD      Lidocaine Viscous HCl  15 mL Swish & Spit 4x Daily PRN HOLLI Galvan      loratadine  10 mg Oral Daily Brina Guillen MD      melatonin  3 mg Oral HS PRN Bora Rosario MD      metFORMIN  500 mg Oral BID With Meals HOLLI Galvan      methocarbamol  500 mg Oral Q6H PRN HOLLI Lion      nicotine polacrilex  2 mg Oral Q4H PRN Bora Rosario MD      OLANZapine  5 mg Oral Q4H PRN Max 3/day Jordan C Holter, DO      Or    OLANZapine  2.5 mg Intramuscular Q4H PRN Max 3/day Ion FISCHER Holter, DO      OLANZapine  5 mg Oral Q3H PRN Max 3/day Ion FISCHER Holter, DO      Or    OLANZapine  5 mg  Intramuscular Q3H PRN Max 3/day Jordan C Holter, DO      OLANZapine  2.5 mg Oral Q4H PRN Max 6/day Jordan C Holter, DO      ondansetron  4 mg Oral Q6H PRN HOLLI Lion      pantoprazole  20 mg Oral Early Morning Eileen JensenHOLLI      polyethylene glycol  17 g Oral Daily HOLLI Lion      polyethylene glycol  17 g Oral Daily PRN Jordan C Holter, DO      propranolol  10 mg Oral Q12H [facility] HOLLI Lion      senna-docusate sodium  1 tablet Oral Daily PRN Jordan C Holter, DO      senna-docusate sodium  2 tablet Oral BID HOLLI Lion      traZODone  50 mg Oral HS PRN HOLLI Lion      white petrolatum-mineral oil   Topical TID PRN HOLLI Lion         Counseling / Coordination of Care: Total floor / unit time spent today 15 minutes. Greater than 50% of total time was spent with the patient and / or family counseling and / or somewhat receptive to supportive listening and teaching positive coping skills to deal with symptom mangement.     Patient's Rights, confidentiality and exceptions to confidentiality, use of automated medical record, Behavioral Health Services staff access to medical record, and consent to treatment reviewed.    This note has been dictated and hence there may be problems with punctuation, spelling and formatting and if anyone has any concerns please address them to Dr. Rosario   This note is not shared with patient due to potential for making patient's condition worse by knowing the content of the note.

## 2024-08-05 NOTE — NURSING NOTE
Patient has been visible all shift. Pleasant and cooperative. Smiles on approach. Good eye contact. Affect flat.  Continues to admit having auditory hallucinations telling him they are going to kill him and his family. Denies any other symptoms or suicidal ideations. Medication compliant. Appetite good. Social with select peers. Attended am groups.

## 2024-08-05 NOTE — NURSING NOTE
Patient requested Nicorette gum po prn at this time.  Patient visible and cooperative all evening, sitting and social with select female peer. Patient was medication compliant and did not offer any complaints this shift. Behaviors controlled and appropriate as well.

## 2024-08-05 NOTE — PROGRESS NOTES
08/05/24 1035   Team Meeting   Meeting Type Tx Team Meeting   Initial Conference Date 08/05/24   Next Conference Date 08/19/24   Team Members Present   Team Members Present Physician;Nurse;;Other (Discipline and Name)   Physician Team Member Abraham   Nursing Team Member Rosanna   Social Work Team Member Jenny ORTEGA   Other (Discipline and Name) Sharlene - CMPMHDS   Patient/Family Present   Patient Present Yes   Patient's Family Present No   OTHER   Team Meeting - Additional Comments Pt attended his tx team meeting. Pt read through his self-assessment and reported ongoing depression, anxiety and hallucinations. Pt reports ECTs have been helpful and that he feels he is managing the ongoing AH and symptoms well. Pt is in agreement with looking to transition home with his aunt in September once ECTs are completed. SW explained ACT services in more detail and pt expressed agreement. Pt is tentatively set for ACT intake 9/12/14.

## 2024-08-06 PROCEDURE — 99232 SBSQ HOSP IP/OBS MODERATE 35: CPT | Performed by: PSYCHIATRY & NEUROLOGY

## 2024-08-06 RX ADMIN — HYDROCHLOROTHIAZIDE 12.5 MG: 12.5 TABLET ORAL at 09:46

## 2024-08-06 RX ADMIN — LORATADINE 10 MG: 10 TABLET ORAL at 09:47

## 2024-08-06 RX ADMIN — NICOTINE POLACRILEX 2 MG: 2 GUM, CHEWING ORAL at 21:12

## 2024-08-06 RX ADMIN — CYANOCOBALAMIN TAB 1000 MCG 1000 MCG: 1000 TAB at 09:47

## 2024-08-06 RX ADMIN — AMLODIPINE BESYLATE 5 MG: 5 TABLET ORAL at 09:46

## 2024-08-06 RX ADMIN — CLOZAPINE 150 MG: 100 TABLET ORAL at 17:15

## 2024-08-06 RX ADMIN — PROPRANOLOL HYDROCHLORIDE 10 MG: 10 TABLET ORAL at 09:46

## 2024-08-06 RX ADMIN — PANTOPRAZOLE SODIUM 20 MG: 20 TABLET, DELAYED RELEASE ORAL at 06:26

## 2024-08-06 RX ADMIN — METFORMIN HYDROCHLORIDE 500 MG: 500 TABLET, FILM COATED ORAL at 09:47

## 2024-08-06 RX ADMIN — SENNOSIDES AND DOCUSATE SODIUM 2 TABLET: 8.6; 5 TABLET ORAL at 17:15

## 2024-08-06 RX ADMIN — ESCITALOPRAM OXALATE 20 MG: 10 TABLET, FILM COATED ORAL at 09:47

## 2024-08-06 RX ADMIN — NICOTINE POLACRILEX 2 MG: 2 GUM, CHEWING ORAL at 14:25

## 2024-08-06 RX ADMIN — CLOZAPINE 350 MG: 100 TABLET ORAL at 21:12

## 2024-08-06 RX ADMIN — GLYCOPYRROLATE 1 MG: 1 TABLET ORAL at 17:15

## 2024-08-06 RX ADMIN — FLUTICASONE PROPIONATE 1 SPRAY: 50 SPRAY, METERED NASAL at 09:47

## 2024-08-06 RX ADMIN — SENNOSIDES AND DOCUSATE SODIUM 2 TABLET: 8.6; 5 TABLET ORAL at 09:47

## 2024-08-06 RX ADMIN — GLYCOPYRROLATE 1 MG: 1 TABLET ORAL at 09:46

## 2024-08-06 RX ADMIN — NICOTINE POLACRILEX 2 MG: 2 GUM, CHEWING ORAL at 10:19

## 2024-08-06 RX ADMIN — PROPRANOLOL HYDROCHLORIDE 10 MG: 10 TABLET ORAL at 21:12

## 2024-08-06 RX ADMIN — FAMOTIDINE 20 MG: 20 TABLET ORAL at 17:15

## 2024-08-06 RX ADMIN — ARIPIPRAZOLE 15 MG: 15 TABLET ORAL at 09:46

## 2024-08-06 RX ADMIN — METFORMIN HYDROCHLORIDE 500 MG: 500 TABLET, FILM COATED ORAL at 17:15

## 2024-08-06 RX ADMIN — POLYETHYLENE GLYCOL 3350 17 G: 17 POWDER, FOR SOLUTION ORAL at 09:47

## 2024-08-06 RX ADMIN — GLYCOPYRROLATE 1 MG: 1 TABLET ORAL at 21:12

## 2024-08-06 RX ADMIN — FAMOTIDINE 20 MG: 20 TABLET ORAL at 09:47

## 2024-08-06 NOTE — PROGRESS NOTES
Psychiatry Progress Note [facility]    Alberto Berumen 28 y.o. male MRN: [id number]  Unit/Bed#: [facility] 108-02 Encounter: [id number]  Code Status: Level 1 - Full Code    PCP: Joce Juan MD    Date of Admission:  3/29/2024 2008   Date of Service:  08/06/24    Patient Active Problem List   Diagnosis    GERD (gastroesophageal reflux disease)    Medical clearance for psychiatric admission    Schizoaffective disorder, bipolar type (HCC)    Tobacco abuse    T wave inversion in EKG    Syringoma    Chronic idiopathic constipation    Vitamin B 12 deficiency    Vitamin D deficiency    Confluent and reticulate papillomatosis    Class 2 obesity in adult    Primary hypertension    Elevated hemoglobin A1c    Bilateral lower extremity edema     Review of systems: Unremarkable   assessment  Overall Status: Status quo with same threatening voices but more visible in the morning and no longer skipping breakfast easier to be awakened in the morning   certification Statement: The patient will continue to require additional inpatient hospital stay due to ongoing voices that are threatening to mixing lack of response to medications and ECT so far.     Medications: Clozapine 350 mg at bedtime and 150 mg at 4 PM, propranolol 10 mg every 12 hours as as needed for anxiety, Abilify 15 mg mg at bedtime Lexapro 20 mg once a day, atropine eyedrops sublingual for drooling of saliva and senna 2 tablets twice a day for constipation  All medications reviewed and recommend they be continued for symptom management   side effects from treatment: None reported  Medication changes   None today  Medication education   Risks side effects benefits and precautions of medications discussed with patient and he did verbalize an understanding about risks for metabolic syndrome from being on neuroleptics and is form tardive dyskinesia etc. and special precautions about being on clozapine   Understanding of medications: Has some  understanding   Justification for dual anti-psychotics: Not applicable    Non-pharmacological treatments  Continue with individual, group, milieu and occupational therapy using recovery principles and psycho-education about accepting illness and the need for treatment.   to contact aunt and explore ACT team referral which he never had  ECT to be continued  q 2 weekly for maintenance x 6  Refuses to see a dietician and agrees to do more exercises as he is about 100 lbs overweight   Prolactin level high so Risperdal replaced with Abilify which he has tolerated well so far    Safety  Safety and communication plan established to target dynamic risk factors discussed above.    Discharge Plan   Back to his aunt with an ACT team once ECT is completed    Interval Progress   Continues to report ECTs have definitely benefited him in decreasing the frequency of voices even though he still hears them that are threatening to kill him and his family but he continues to insist that they keep him out of his head and is looking forward to having more scheduled every 2 weeks.  Interacting with select peers and is keeping appropriate boundaries with a female peer.  Continues to wear hospital clothing and still appears internally preoccupied with a constricted affect when approached.  No major behavioral issues or problems and easily awakened in the mornings and no longer skipping breakfast since med changes from last week.  No need for behavioral PRNs and has not been aggressive or agitated or threatening or self-abusive   acceptance by patient: Accepting  Hopefulness in recovery: Living with his aunt in the [facility]  Involved in reintegration process: Talking with aunt and his siblings and visiting with them on the unit   trusting in relationship with psychiatrist: Trusting  Sleep: Good  Appetite: Good  Compliance with Medications: Good  Group attendance: Attending more  Significant events and progress towards goals:  Improving to some extent but still with same voices    mental Status Exam  Appearance: age appropriate, improved grooming, looks older than stated age, overweight, with hair in dreadlocks casually dressed with a clean shaven face, fairly groomed with good eye contact  casually dressed, no longer sleepy , found laying on bed able to be aroused with bad body odor and reminded to put the clothes in the wash  behavior: still somewhat cold and aloof but redirectable  speech: normal rate, normal volume, normal pitch  Mood: dysphoric, anxious, continues to report feeling good with the ECT   affect: constricted, inappropriate, mood-congruent constricted affect   thought Process: organized, logical, coherent, goal directed, linear, decreased rate of thoughts, slowing of thoughts, negative thinking, impaired abstract reasoning, concrete  Thought Content: paranoid ideation, some paranoia, grandiose ideas, intrusive thoughts, preoccupied, chronic, continues to report paranoia about people threatening to kill him and his family because of the voices.  He believes ECT has helped so that he is not much in his head.  No other delusions elicited.  No current suicidal or homicidal thoughts and no plans verbalized but today reports having passive death wishes because of voices with no plans and able to CFS and it has not changed yet  .  No phobias obsessions compulsions or distorted body perceptions reported.  Preoccupied with wanting to get ECTs more often despite reminding him that it may impair his memory and finally he agreed to such as it is  every 2 weeks  Perceptual Disturbances: still with same threatening voices to kill him and his famil and tells me they emilia far less often   Hx Risk Factors: chronic psychiatric problems, chronic anxiety symptoms, chronic psychotic symptoms,    Sensorium: oriented x 3 spheres and situation   Cognition: recent and remote memory grossly intact  Consciousness: alert and awake  Attention:  attention and concentration fair   Intellect: appears to be of average intelligence  Insight: intact  Judgement: intact  Motor Activity: no abnormal movements     Vitals  Temp:  [97.5 °F (36.4 °C)-98.5 °F (36.9 °C)] 97.5 °F (36.4 °C)  HR:  [] 103  Resp:  [18] 18  BP: (122-153)/(68-90) 153/90  SpO2:  [95 %-100 %] 100 %    Intake/Output Summary (Last 24 hours) at 8/6/2024 0512  Last data filed at 8/5/2024 1059  Gross per 24 hour   Intake 0 ml   Output --   Net 0 ml                   Lab Results: All    Current Facility-Administered Medications   Medication Dose Route Frequency Provider Last Rate    acetaminophen  650 mg Oral Q4H PRN Jordan C Holter, DO      acetaminophen  650 mg Oral Q6H PRN HOLLI Lion      aluminum-magnesium hydroxide-simethicone  30 mL Oral Q4H PRN Jordan C Holter, DO      amLODIPine  5 mg Oral Daily HOLLI Lion      ARIPiprazole  15 mg Oral Daily Bora Rosario MD      Artificial Tears  1 drop Both Eyes Q3H PRN Jordan C Holter, DO      atropine  1 drop Sublingual Daily PRN HOLLI Lion      haloperidol lactate  2.5 mg Intramuscular Q4H PRN Max 4/day HOLLI Lion      And    LORazepam  1 mg Intramuscular Q4H PRN Max 4/day HOLLI Lion      And    benztropine  0.5 mg Intramuscular Q4H PRN Max 4/day HOLLI Lion      benztropine  1 mg Intramuscular Q4H PRN Max 6/day Jordan C Holter, DO      haloperidol lactate  5 mg Intramuscular Q4H PRN Max 4/day HOLLI Lion      And    LORazepam  2 mg Intramuscular Q4H PRN Max 4/day HOLLI Lion      And    benztropine  1 mg Intramuscular Q4H PRN Max 4/day HOLLI Lion      benztropine  1 mg Oral Q4H PRN Max 6/day HOLLI Lion      benztropine  1 mg Oral Q4H PRN Max 6/day Jordan C Holter,       bisacodyl  10 mg Rectal Daily PRN HOLLI Lion      calcium carbonate  500 mg Oral BID PRN HOLLI Monge      cloZAPine  150 mg Oral Before Dinner Bora Rosario MD      cloZAPine   350 mg Oral HS Bora Rosario MD      cyanocobalamin  1,000 mcg Oral Daily HOLLI Galvan      hydrOXYzine HCL  50 mg Oral Q6H PRN Max 4/day Ion FISCHER Holter, DO      Or    diphenhydrAMINE  50 mg Intramuscular Q6H PRN Ion FISCHER Holter, DO      hydrOXYzine HCL  50 mg Oral Q6H PRN Max 4/day HOLLI Lion      Or    diphenhydrAMINE  50 mg Intramuscular Q6H PRN HOLLI Lion      diphenhydrAMINE-zinc acetate   Topical BID PRN HOLLI Lion      escitalopram  20 mg Oral Daily HOLLI Lion      famotidine  20 mg Oral BID Eileen CherryHOLLI Jimenez      fluticasone  1 spray Each Nare Daily Brina Guillen MD      glycopyrrolate  1 mg Oral TID Bora Rosario MD      haloperidol  1 mg Oral Q6H PRN HOLLI Lion      haloperidol  2.5 mg Oral Q4H PRN Max 4/day HOLLI Lion      haloperidol  5 mg Oral Q4H PRN Max 4/day HOLLI Lion      hydroCHLOROthiazide  12.5 mg Oral Daily HOLLI Galvan      hydrocortisone   Topical 4x Daily PRN HOLLI Lion      hydrOXYzine HCL  100 mg Oral Q6H PRN Max 4/day Ion FISCHER Holter, DO      Or    LORazepam  2 mg Intramuscular Q6H PRN Ion FISCHER Holter, DO      hydrOXYzine HCL  100 mg Oral Q6H PRN Max 4/day HOLLI Lion      Or    LORazepam  2 mg Intramuscular Q6H PRN HOLLI Lion      hydrOXYzine HCL  25 mg Oral Q6H PRN Max 4/day Ion FISCHER Holter, DO      ibuprofen  600 mg Oral Q8H PRN HOLLI Lion      lactated ringers  50 mL/hr Intravenous Continuous Antwan Dong MD      Lidocaine Viscous HCl  15 mL Swish & Spit 4x Daily PRN HOLLI Galvan      loratadine  10 mg Oral Daily Brina Guillen MD      melatonin  3 mg Oral HS PRN Bora Rosario MD      metFORMIN  500 mg Oral BID With Meals HOLLI Galvan      methocarbamol  500 mg Oral Q6H PRN HOLLI Lion      nicotine polacrilex  2 mg Oral Q4H PRN Bora Rosario MD      OLANZapine  5 mg Oral Q4H PRN Max 3/day Jordan C Holter, DO      Or     OLANZapine  2.5 mg Intramuscular Q4H PRN Max 3/day Ion C Holter, DO      OLANZapine  5 mg Oral Q3H PRN Max 3/day Ion C Holter, DO      Or    OLANZapine  5 mg Intramuscular Q3H PRN Max 3/day Ion C Holter, DO      OLANZapine  2.5 mg Oral Q4H PRN Max 6/day Ion C Holter, DO      ondansetron  4 mg Oral Q6H PRN HOLLI Lion      pantoprazole  20 mg Oral Early Morning Eileen Gonzalezmiryamjaylen, HOLLI      polyethylene glycol  17 g Oral Daily HOLLI Lion      polyethylene glycol  17 g Oral Daily PRN [facility] Holter, DO      propranolol  10 mg Oral Q12H KAYLIE HOLLI Lion      senna-docusate sodium  1 tablet Oral Daily PRN Ion C Cresencioter, DO      senna-docusate sodium  2 tablet Oral BID HOLLI Lion      traZODone  50 mg Oral HS PRN HOLLI Lion      white petrolatum-mineral oil   Topical TID PRN HOLLI Lion         Counseling / Coordination of Care: Total floor / unit time spent today 15 minutes. Greater than 50% of total time was spent with the patient and / or family counseling and / or somewhat receptive to supportive listening and teaching positive coping skills to deal with symptom mangement.     Patient's Rights, confidentiality and exceptions to confidentiality, use of automated medical record, Behavioral Health Services staff access to medical record, and consent to treatment reviewed.    This note has been dictated and hence there may be problems with punctuation, spelling and formatting and if anyone has any concerns please address them to Dr. Rosario   This note is not shared with patient due to potential for making patient's condition worse by knowing the content of the note.

## 2024-08-06 NOTE — NURSING NOTE
Pt is present on the milieu and social with select peers. He consumed 100 % of dinner. Took his medications without incidence. Pt is pleasant and cooperative. Pt has flat and blunted affect. Pt endorses 7/10 depression and moderate anxiety. Pt continues to have the same AH. Pt is excited to see his brother and sister tomorrow. Attended groups. No behavioral issues.

## 2024-08-06 NOTE — NURSING NOTE
Alberto maintained on ongoing SAFE precaution without incident on this shift.  Awake, alert , visible, pleasant, withdrawn and cooperative upon approach.   Attended and participated in 5 out of 8 groups today.  Continues to be compliant with medication regimen and had snack.  Spend most of his time taking with a female peer.  Both parties are maintaining proper distance, no inappropriate observed.  Denies any oral pain or discomfort on this shift.  Denies any depression or anxiety.  No delusion  or A/T/V hallucination noted.  Behavioral control

## 2024-08-06 NOTE — NURSING NOTE
"Patient is calm with a blunted affect, depressed mood reporting AH of voice saying he is going to kill him and his family. Pt says he is afraid of not being in the hospital, says he is \"afraid I'm yohan die.\" Spoke with patient about importance of therapy and gaining copign skills to live in a non-hospitalized setting. Pt expresses doubt and says \"I'm just not ready.\" Says he feels tired this morning, did not get up for breakfast. Denies anxiety, HI/AVH. No unmet needs at this time.    "

## 2024-08-06 NOTE — PLAN OF CARE
Problem: Alteration in Thoughts and Perception  Goal: Treatment Goal: Gain control of psychotic behaviors/thinking, reduce/eliminate presenting symptoms and demonstrate improved reality functioning upon discharge  Outcome: Progressing  Goal: Verbalize thoughts and feelings  Description: Interventions:  - Promote a nonjudgmental and trusting relationship with the patient through active listening and therapeutic communication  - Assess patient's level of functioning, behavior and potential for risk  - Engage patient in 1 on 1 interactions  - Encourage patient to express fears, feelings, frustrations, and discuss symptoms    - [place] patient to reality, help patient recognize reality-based thinking   - Administer medications as ordered and assess for potential side effects  - Provide the patient education related to the signs and symptoms of the illness and desired effects of prescribed medications  Outcome: Progressing  Goal: Refrain from acting on delusional thinking/internal stimuli  Description: Interventions:  - Monitor patient closely, per order   - Utilize least restrictive measures   - Set reasonable limits, give positive feedback for acceptable   - Administer medications as ordered and monitor of potential side effects  Outcome: Progressing  Goal: Agree to be compliant with medication regime, as prescribed and report medication side effects  Description: Interventions:  - Offer appropriate PRN medication and supervise ingestion; conduct AIMS, as needed   Outcome: Progressing  Goal: Attend and participate in unit activities, including therapeutic, recreational, and educational groups  Description: Interventions:  -Encourage Visitation and family involvement in care  Outcome: Progressing  Goal: Recognize dysfunctional thoughts, communicate reality-based thoughts at the time of discharge  Description: Interventions:  - Provide medication and psycho-education to assist patient in compliance and developing  insight into his/her illness   Outcome: Progressing  Goal: Complete daily ADLs, including personal hygiene independently, as able  Description: Interventions:  - Observe, teach, and assist patient with ADLS  - Monitor and promote a balance of rest/activity, with adequate nutrition and elimination   Outcome: Progressing     Problem: Ineffective Coping  Goal: Demonstrates healthy coping skills  Outcome: Progressing  Goal: Participates in unit activities  Description: Interventions:  - Provide therapeutic environment   - Provide required programming   - Redirect inappropriate behaviors   Outcome: Progressing  Goal: Patient/Family participate in treatment and DC plans  Description: Interventions:  - Provide therapeutic environment  Outcome: Progressing  Goal: Patient/Family verbalizes awareness of resources  Outcome: Progressing     Problem: Depression  Goal: Treatment Goal: Demonstrate behavioral control of depressive symptoms, verbalize feelings of improved mood/affect, and adopt new coping skills prior to discharge  Outcome: Progressing  Goal: Verbalize thoughts and feelings  Description: Interventions:  - Assess and re-assess patient's level of risk   - Engage patient in 1:1 interactions, daily, for a minimum of 15 minutes   - Encourage patient to express feelings, fears, frustrations, hopes   Outcome: Progressing  Goal: Refrain from harming self  Description: Interventions:  - Monitor patient closely, per order   - Supervise medication ingestion, monitor effects and side effects   Outcome: Progressing  Goal: Refrain from isolation  Description: Interventions:  - Develop a trusting relationship   - Encourage socialization   Outcome: Progressing  Goal: Refrain from self-neglect  Outcome: Progressing  Goal: Attend and participate in unit activities, including therapeutic, recreational, and educational groups  Description: Interventions:  - Provide therapeutic and educational activities daily, encourage attendance and  participation, and document same in the medical record   Outcome: Progressing  Goal: Complete daily ADLs, including personal hygiene independently, as able  Description: Interventions:  - Observe, teach, and assist patient with ADLS  -  Monitor and promote a balance of rest/activity, with adequate nutrition and elimination   Outcome: Progressing     Problem: Alteration in Orientation  Goal: Express concerns related to confused thinking related to:  Description: Interventions:  - Encourage patient to express feelings, fears, frustrations, hopes  - Assign consistent caregivers   - [place]/re-orient patient as needed  - Allow comfort items, as appropriate  - Provide visual cues, signs, etc.   Outcome: Progressing  Goal: Allow medical examinations, as recommended  Description: Interventions:  - Provide physical/neurological exams and/or referrals, per provider   Outcome: Progressing  Goal: Cooperate with recommended testing/procedures  Description: Interventions:  - Determine need for ancillary testing  - Observe for mental status changes  - Implement falls/precaution protocol   Outcome: Progressing  Goal: Attend and participate in unit activities, including therapeutic, recreational, and educational groups  Description: Interventions:  - Provide therapeutic and educational activities daily, encourage attendance and participation, and document same in the medical record   - Provide appropriate opportunities for reminiscence   - Provide a consistent daily routine   - Encourage family contact/visitation   Outcome: Progressing  Goal: Complete daily ADLs, including personal hygiene independently, as able  Description: Interventions:  - Observe, teach, and assist patient with ADLS  Outcome: Progressing     Problem: Electroconvulsive therapy (ECT)  Goal: Treatment Goal: Demonstrate a reduction of confusion and improved orientation to person, place, time and/or situation upon discharge, according to optimum  baseline/ability  Outcome: Progressing  Goal: Achieves stable or improved neurological status  Description: INTERVENTIONS  - Monitor and report changes in neurological status  - Monitor vital signs such as temperature, blood pressure, glucose, and any other labs ordered   - Initiate measures to prevent increased intracranial pressure  - Monitor for seizure activity and implement precautions if appropriate      Outcome: Progressing  Goal: Minimal or absence of nausea and/or vomiting  Description: INTERVENTIONS:  - Administer IV fluids if ordered to ensure adequate hydration  - Maintain NPO status until nausea and vomiting are resolved  - Nasogastric tube if ordered  - Administer ordered antiemetic medications as needed  - Provide nonpharmacologic comfort measures as appropriate  - Advance diet as tolerated, if ordered  - Consider nutrition services referral to assist patient with adequate nutrition and appropriate food choices  Outcome: Progressing  Goal: Maintains adequate nutritional intake  Description: INTERVENTIONS:  - Monitor percentage of each meal consumed  - Identify factors contributing to decreased intake, treat as appropriate  - Assist with meals as needed  - Monitor I&O, weight, and lab values if indicated  - Obtain nutrition services referral as needed  Outcome: Progressing     Problem: DISCHARGE PLANNING - CARE MANAGEMENT  Goal: Discharge to post-acute care or home with appropriate resources  Description: INTERVENTIONS:  - Conduct assessment to determine patient/family and health care team treatment goals, and need for post-acute services based on payer coverage, community resources, and patient preferences, and barriers to discharge  - Address psychosocial, clinical, and financial barriers to discharge as identified in assessment in conjunction with the patient/family and health care team  - Arrange appropriate level of post-acute services according to patient’s   needs and preference and payer  coverage in collaboration with the physician and health care team  - Communicate with and update the patient/family, physician, and health care team regarding progress on the discharge plan  - Arrange appropriate transportation to post-acute venues  Outcome: Progressing     Problem: Alteration in Thoughts and Perception  Goal: Treatment Goal: Gain control of psychotic behaviors/thinking, reduce/eliminate presenting symptoms and demonstrate improved reality functioning upon discharge  Outcome: Progressing  Goal: Verbalize thoughts and feelings  Description: Interventions:  - Promote a nonjudgmental and trusting relationship with the patient through active listening and therapeutic communication  - Assess patient's level of functioning, behavior and potential for risk  - Engage patient in 1 on 1 interactions  - Encourage patient to express fears, feelings, frustrations, and discuss symptoms    - [place] patient to reality, help patient recognize reality-based thinking   - Administer medications as ordered and assess for potential side effects  - Provide the patient education related to the signs and symptoms of the illness and desired effects of prescribed medications  Outcome: Progressing  Goal: Refrain from acting on delusional thinking/internal stimuli  Description: Interventions:  - Monitor patient closely, per order   - Utilize least restrictive measures   - Set reasonable limits, give positive feedback for acceptable   - Administer medications as ordered and monitor of potential side effects  Outcome: Progressing  Goal: Agree to be compliant with medication regime, as prescribed and report medication side effects  Description: Interventions:  - Offer appropriate PRN medication and supervise ingestion; conduct AIMS, as needed   Outcome: Progressing  Goal: Attend and participate in unit activities, including therapeutic, recreational, and educational groups  Description: Interventions:  -Encourage Visitation and  family involvement in care  Outcome: Progressing  Goal: Recognize dysfunctional thoughts, communicate reality-based thoughts at the time of discharge  Description: Interventions:  - Provide medication and psycho-education to assist patient in compliance and developing insight into his/her illness   Outcome: Progressing  Goal: Complete daily ADLs, including personal hygiene independently, as able  Description: Interventions:  - Observe, teach, and assist patient with ADLS  - Monitor and promote a balance of rest/activity, with adequate nutrition and elimination   Outcome: Progressing     Problem: Ineffective Coping  Goal: Demonstrates healthy coping skills  Outcome: Progressing  Goal: Participates in unit activities  Description: Interventions:  - Provide therapeutic environment   - Provide required programming   - Redirect inappropriate behaviors   Outcome: Progressing  Goal: Patient/Family participate in treatment and DC plans  Description: Interventions:  - Provide therapeutic environment  Outcome: Progressing  Goal: Patient/Family verbalizes awareness of resources  Outcome: Progressing     Problem: Depression  Goal: Treatment Goal: Demonstrate behavioral control of depressive symptoms, verbalize feelings of improved mood/affect, and adopt new coping skills prior to discharge  Outcome: Progressing  Goal: Verbalize thoughts and feelings  Description: Interventions:  - Assess and re-assess patient's level of risk   - Engage patient in 1:1 interactions, daily, for a minimum of 15 minutes   - Encourage patient to express feelings, fears, frustrations, hopes   Outcome: Progressing  Goal: Refrain from harming self  Description: Interventions:  - Monitor patient closely, per order   - Supervise medication ingestion, monitor effects and side effects   Outcome: Progressing  Goal: Refrain from isolation  Description: Interventions:  - Develop a trusting relationship   - Encourage socialization   Outcome: Progressing  Goal:  Refrain from self-neglect  Outcome: Progressing  Goal: Attend and participate in unit activities, including therapeutic, recreational, and educational groups  Description: Interventions:  - Provide therapeutic and educational activities daily, encourage attendance and participation, and document same in the medical record   Outcome: Progressing  Goal: Complete daily ADLs, including personal hygiene independently, as able  Description: Interventions:  - Observe, teach, and assist patient with ADLS  -  Monitor and promote a balance of rest/activity, with adequate nutrition and elimination   Outcome: Progressing     Problem: Alteration in Orientation  Goal: Express concerns related to confused thinking related to:  Description: Interventions:  - Encourage patient to express feelings, fears, frustrations, hopes  - Assign consistent caregivers   - [place]/re-orient patient as needed  - Allow comfort items, as appropriate  - Provide visual cues, signs, etc.   Outcome: Progressing  Goal: Allow medical examinations, as recommended  Description: Interventions:  - Provide physical/neurological exams and/or referrals, per provider   Outcome: Progressing  Goal: Cooperate with recommended testing/procedures  Description: Interventions:  - Determine need for ancillary testing  - Observe for mental status changes  - Implement falls/precaution protocol   Outcome: Progressing  Goal: Attend and participate in unit activities, including therapeutic, recreational, and educational groups  Description: Interventions:  - Provide therapeutic and educational activities daily, encourage attendance and participation, and document same in the medical record   - Provide appropriate opportunities for reminiscence   - Provide a consistent daily routine   - Encourage family contact/visitation   Outcome: Progressing  Goal: Complete daily ADLs, including personal hygiene independently, as able  Description: Interventions:  - Observe, teach, and  assist patient with ADLS  Outcome: Progressing     Problem: Electroconvulsive therapy (ECT)  Goal: Treatment Goal: Demonstrate a reduction of confusion and improved orientation to person, place, time and/or situation upon discharge, according to optimum baseline/ability  Outcome: Progressing  Goal: Achieves stable or improved neurological status  Description: INTERVENTIONS  - Monitor and report changes in neurological status  - Monitor vital signs such as temperature, blood pressure, glucose, and any other labs ordered   - Initiate measures to prevent increased intracranial pressure  - Monitor for seizure activity and implement precautions if appropriate      Outcome: Progressing  Goal: Minimal or absence of nausea and/or vomiting  Description: INTERVENTIONS:  - Administer IV fluids if ordered to ensure adequate hydration  - Maintain NPO status until nausea and vomiting are resolved  - Nasogastric tube if ordered  - Administer ordered antiemetic medications as needed  - Provide nonpharmacologic comfort measures as appropriate  - Advance diet as tolerated, if ordered  - Consider nutrition services referral to assist patient with adequate nutrition and appropriate food choices  Outcome: Progressing  Goal: Maintains adequate nutritional intake  Description: INTERVENTIONS:  - Monitor percentage of each meal consumed  - Identify factors contributing to decreased intake, treat as appropriate  - Assist with meals as needed  - Monitor I&O, weight, and lab values if indicated  - Obtain nutrition services referral as needed  Outcome: Progressing     Problem: DISCHARGE PLANNING - CARE MANAGEMENT  Goal: Discharge to post-acute care or home with appropriate resources  Description: INTERVENTIONS:  - Conduct assessment to determine patient/family and health care team treatment goals, and need for post-acute services based on payer coverage, community resources, and patient preferences, and barriers to discharge  - Address  psychosocial, clinical, and financial barriers to discharge as identified in assessment in conjunction with the patient/family and health care team  - Arrange appropriate level of post-acute services according to patient’s   needs and preference and payer coverage in collaboration with the physician and health care team  - Communicate with and update the patient/family, physician, and health care team regarding progress on the discharge plan  - Arrange appropriate transportation to post-acute venues  Outcome: Progressing

## 2024-08-06 NOTE — PROGRESS NOTES
08/06/24 0743   Team Meeting   Meeting Type Daily Rounds   Team Members Present   Team Members Present Physician;Nurse;;Other (Discipline and Name)   Patient/Family Present   Patient Present No   Patient's Family Present No     In attendance:  Meghann Noonan, CRNP Dr. Jordan Holter, DO Wanda Wang, RN  Luisana Alvarado, [facility]  Carla Lux, [facility]  MATILDA Daniel.S.    Groups: 5/8      Pt reports ongoing AH. Pt has been appropriate with female peer. In person visit with sister tomorrow at 11.

## 2024-08-07 PROCEDURE — 99232 SBSQ HOSP IP/OBS MODERATE 35: CPT | Performed by: PSYCHIATRY & NEUROLOGY

## 2024-08-07 RX ADMIN — AMLODIPINE BESYLATE 5 MG: 5 TABLET ORAL at 08:58

## 2024-08-07 RX ADMIN — FAMOTIDINE 20 MG: 20 TABLET ORAL at 17:10

## 2024-08-07 RX ADMIN — FLUTICASONE PROPIONATE 1 SPRAY: 50 SPRAY, METERED NASAL at 08:59

## 2024-08-07 RX ADMIN — ESCITALOPRAM OXALATE 20 MG: 10 TABLET, FILM COATED ORAL at 08:58

## 2024-08-07 RX ADMIN — CLOZAPINE 150 MG: 100 TABLET ORAL at 17:09

## 2024-08-07 RX ADMIN — GLYCOPYRROLATE 1 MG: 1 TABLET ORAL at 21:07

## 2024-08-07 RX ADMIN — SENNOSIDES AND DOCUSATE SODIUM 2 TABLET: 8.6; 5 TABLET ORAL at 08:58

## 2024-08-07 RX ADMIN — POLYETHYLENE GLYCOL 3350 17 G: 17 POWDER, FOR SOLUTION ORAL at 08:57

## 2024-08-07 RX ADMIN — LORATADINE 10 MG: 10 TABLET ORAL at 08:58

## 2024-08-07 RX ADMIN — PANTOPRAZOLE SODIUM 20 MG: 20 TABLET, DELAYED RELEASE ORAL at 06:38

## 2024-08-07 RX ADMIN — PROPRANOLOL HYDROCHLORIDE 10 MG: 10 TABLET ORAL at 21:07

## 2024-08-07 RX ADMIN — GLYCOPYRROLATE 1 MG: 1 TABLET ORAL at 17:10

## 2024-08-07 RX ADMIN — HYDROCHLOROTHIAZIDE 12.5 MG: 12.5 TABLET ORAL at 08:58

## 2024-08-07 RX ADMIN — NICOTINE POLACRILEX 2 MG: 2 GUM, CHEWING ORAL at 17:09

## 2024-08-07 RX ADMIN — FAMOTIDINE 20 MG: 20 TABLET ORAL at 08:58

## 2024-08-07 RX ADMIN — NICOTINE POLACRILEX 2 MG: 2 GUM, CHEWING ORAL at 21:07

## 2024-08-07 RX ADMIN — NICOTINE POLACRILEX 2 MG: 2 GUM, CHEWING ORAL at 08:58

## 2024-08-07 RX ADMIN — CLOZAPINE 350 MG: 100 TABLET ORAL at 21:07

## 2024-08-07 RX ADMIN — METFORMIN HYDROCHLORIDE 500 MG: 500 TABLET, FILM COATED ORAL at 17:10

## 2024-08-07 RX ADMIN — ARIPIPRAZOLE 15 MG: 15 TABLET ORAL at 08:58

## 2024-08-07 RX ADMIN — GLYCOPYRROLATE 1 MG: 1 TABLET ORAL at 08:58

## 2024-08-07 RX ADMIN — CYANOCOBALAMIN TAB 1000 MCG 1000 MCG: 1000 TAB at 08:58

## 2024-08-07 RX ADMIN — SENNOSIDES AND DOCUSATE SODIUM 2 TABLET: 8.6; 5 TABLET ORAL at 17:10

## 2024-08-07 RX ADMIN — METFORMIN HYDROCHLORIDE 500 MG: 500 TABLET, FILM COATED ORAL at 08:59

## 2024-08-07 RX ADMIN — PROPRANOLOL HYDROCHLORIDE 10 MG: 10 TABLET ORAL at 08:58

## 2024-08-07 NOTE — PROGRESS NOTES
Psychiatry Progress Note [facility]    Alberto Berumen 28 y.o. male MRN: [id number]  Unit/Bed#: [facility] 108-02 Encounter: [id number]  Code Status: Level 1 - Full Code    PCP: Joce Juan MD    Date of Admission:  3/29/2024 2008   Date of Service:  08/07/24    Patient Active Problem List   Diagnosis    GERD (gastroesophageal reflux disease)    Medical clearance for psychiatric admission    Schizoaffective disorder, bipolar type (HCC)    Tobacco abuse    T wave inversion in EKG    Syringoma    Chronic idiopathic constipation    Vitamin B 12 deficiency    Vitamin D deficiency    Confluent and reticulate papillomatosis    Class 2 obesity in adult    Primary hypertension    Elevated hemoglobin A1c    Bilateral lower extremity edema     Review of systems: Unremarkable   assessment  Overall Status: Continues to hear the same threatening voices and not feeling safe once he leaves the hospital but believes ECTs have definitely helped   certification Statement: The patient will continue to require additional inpatient hospital stay due to ongoing voices that are threatening to mixing lack of response to medications and ECT so far.     Medications: Clozapine 350 mg at bedtime and 150 mg at 4 PM, propranolol 10 mg every 12 hours as as needed for anxiety, Abilify 15 mg mg at bedtime Lexapro 20 mg once a day, atropine eyedrops sublingual for drooling of saliva and senna 2 tablets twice a day for constipation  All medications reviewed and recommend they be continued for symptom management   side effects from treatment: None reported  Medication changes   None today  Medication education   Risks side effects benefits and precautions of medications discussed with patient and he did verbalize an understanding about risks for metabolic syndrome from being on neuroleptics and is form tardive dyskinesia etc. and special precautions about being on clozapine   Understanding of medications: Has some understanding    Justification for dual anti-psychotics: Not applicable    Non-pharmacological treatments  Continue with individual, group, milieu and occupational therapy using recovery principles and psycho-education about accepting illness and the need for treatment.   to contact aunt and explore ACT team referral which he never had  ECT to be continued  q 2 weekly for maintenance x 6  Refuses to see a dietician and agrees to do more exercises as he is about 100 lbs overweight   Prolactin level high so Risperdal replaced with Abilify which he has tolerated well so far    Safety  Safety and communication plan established to target dynamic risk factors discussed above.    Discharge Plan   Back to his aunt with an ACT team once ECT is completed    Interval Progress   Still hearing same threatening voices and he does not feel safe once he leaves the hospital but believes ECTs had apparently cut down the voices.  Continues to feel depressed because of the voices.  Interacting with select peers and keeping appropriate boundaries with a female peer.  Wearing hospital clothing appearing internally preoccupied with a constricted affect when approached with no major mood reactivity.  No major behavioral issues or PRNs and had skipped breakfast yesterday morning as he wanted to sleep and.  No need for behavioral PRNs and not aggressive or agitated or threatening or self-abusive or suicidal   acceptance by patient: Accepting  Hopefulness in recovery: Living with his aunt in the [facility]  Involved in reintegration process: Talking with aunt and his siblings and visiting with them on the unit  trusting in relationship with psychiatrist: Trusting  Sleep: Good  Appetite: Good  Compliance with Medications: Good  Group attendance: Attending more 6/9  Significant events and progress towards goals: I improving to some extent but still with same voices that are threatening    mental Status Exam  Appearance: age appropriate, improved  grooming, looks older than stated age, overweight, with hair in dreadlocks casually dressed with a clean shaven face, fairly groomed with good eye contact wide-awake casually dressed in hospital clothing walking the hallway dancing to the tune of music much animated  behavior: Not cold and aloof as usual somewhat animated  speech: normal rate, normal volume, normal pitch  Mood: dysphoric, anxious, continues to report feeling good with the ECT   affect: constricted, inappropriate, mood-congruent less constricted with some mood reactivity  thought Process: organized, logical, coherent, goal directed, linear, decreased rate of thoughts, slowing of thoughts, negative thinking, impaired abstract reasoning, concrete  Thought Content: paranoid ideation, some paranoia, grandiose ideas, intrusive thoughts, preoccupied, chronic, continues to report paranoia about people threatening to kill him and his family because of the voices.  He believes ECT has helped so that he is not much in his head.  No other delusions elicited.  No current suicidal or homicidal thoughts and no plans verbalized but today reports having passive death wishes because of voices with no plans and able to CFS and it has not changed yet  .  No phobias obsessions compulsions or distorted body perceptions reported.  Preoccupied with wanting to get ECTs more often despite reminding him that it may impair his memory and finally he agreed to such as it is  every 2 weeks  Perceptual Disturbances: Continues to report same threatening voices to kill him and his family   Hx Risk Factors: chronic psychiatric problems, chronic anxiety symptoms, chronic psychotic symptoms,    Sensorium: Oriented x 3 spheres and situation  Cognition: recent and remote memory grossly intact  Consciousness: alert and awake  Attention: Attention and concentration fair  Intellect: appears to be of average intelligence  Insight: intact  Judgement: intact  Motor Activity: no abnormal  movements     Vitals  Temp:  [97.9 °F (36.6 °C)-98.7 °F (37.1 °C)] 97.9 °F (36.6 °C)  HR:  [] 85  Resp:  [18] 18  BP: (118-136)/(57-86) 128/86  SpO2:  [97 %-98 %] 97 %  No intake or output data in the 24 hours ending 08/07/24 0507                  Lab Results: All    Current Facility-Administered Medications   Medication Dose Route Frequency Provider Last Rate    acetaminophen  650 mg Oral Q4H PRN Jordan C Holter, DO      acetaminophen  650 mg Oral Q6H PRN HOLLI Lion      aluminum-magnesium hydroxide-simethicone  30 mL Oral Q4H PRN Jordan C Holter, DO      amLODIPine  5 mg Oral Daily STEVE LionNP      ARIPiprazole  15 mg Oral Daily Bora Rosario MD      Artificial Tears  1 drop Both Eyes Q3H PRN Jordan C Holter, DO      atropine  1 drop Sublingual Daily PRN HOLLI Lion      haloperidol lactate  2.5 mg Intramuscular Q4H PRN Max 4/day STEVE LionNP      And    LORazepam  1 mg Intramuscular Q4H PRN Max 4/day STEVE LionNP      And    benztropine  0.5 mg Intramuscular Q4H PRN Max 4/day HOLLI Lion      benztropine  1 mg Intramuscular Q4H PRN Max 6/day Jordan C Holter, DO      haloperidol lactate  5 mg Intramuscular Q4H PRN Max 4/day STEVE LionNP      And    LORazepam  2 mg Intramuscular Q4H PRN Max 4/day HOLLI Lion      And    benztropine  1 mg Intramuscular Q4H PRN Max 4/day HOLLI Lion      benztropine  1 mg Oral Q4H PRN Max 6/day HOLLI Lion      benztropine  1 mg Oral Q4H PRN Max 6/day Jordan C Holter, DO      bisacodyl  10 mg Rectal Daily PRN HOLLI Lion      calcium carbonate  500 mg Oral BID PRN HOLLI Monge      cloZAPine  150 mg Oral Before Dinner Bora Rosario MD      cloZAPine  350 mg Oral HS Bora Rosario MD      cyanocobalamin  1,000 mcg Oral Daily HOLLI Galvan      hydrOXYzine HCL  50 mg Oral Q6H PRN Max 4/day Jordan C Holter, DO      Or    diphenhydrAMINE  50 mg Intramuscular Q6H PRN Ion FISCHER  Holter, DO      hydrOXYzine HCL  50 mg Oral Q6H PRN Max 4/day HOLLI Lion      Or    diphenhydrAMINE  50 mg Intramuscular Q6H PRN HOLLI Lion      diphenhydrAMINE-zinc acetate   Topical BID PRN HOLLI Lion      escitalopram  20 mg Oral Daily HOLLI Lion      famotidine  20 mg Oral BID Eileen Holliday HOLLI Jensen      fluticasone  1 spray Each Nare Daily Brina Guillen MD      glycopyrrolate  1 mg Oral TID Bora Rosario MD      haloperidol  1 mg Oral Q6H PRN HOLLI Lion      haloperidol  2.5 mg Oral Q4H PRN Max 4/day HOLLI Lion      haloperidol  5 mg Oral Q4H PRN Max 4/day HOLLI Lion      hydroCHLOROthiazide  12.5 mg Oral Daily HOLLI Galvan      hydrocortisone   Topical 4x Daily PRN HOLLI Lion      hydrOXYzine HCL  100 mg Oral Q6H PRN Max 4/day [facility] Holter, DO      Or    LORazepam  2 mg Intramuscular Q6H PRN [facility] Holter, DO      hydrOXYzine HCL  100 mg Oral Q6H PRN Max 4/day HOLLI Lion      Or    LORazepam  2 mg Intramuscular Q6H PRN HOLLI Lion      hydrOXYzine HCL  25 mg Oral Q6H PRN Max 4/day [facility] Holter, DO      ibuprofen  600 mg Oral Q8H PRN HOLLI Lion      lactated ringers  50 mL/hr Intravenous Continuous Antwan Dong MD      Lidocaine Viscous HCl  15 mL Swish & Spit 4x Daily PRN HOLLI Galvan      loratadine  10 mg Oral Daily Brina Guillen MD      melatonin  3 mg Oral HS PRN Bora Rosario MD      metFORMIN  500 mg Oral BID With Meals HOLLI Galvan      methocarbamol  500 mg Oral Q6H PRN HOLLI Lion      nicotine polacrilex  2 mg Oral Q4H PRN Bora Rosario MD      OLANZapine  5 mg Oral Q4H PRN Max 3/day [facility] Holter, DO      Or    OLANZapine  2.5 mg Intramuscular Q4H PRN Max 3/day [facility] Holter, DO      OLANZapine  5 mg Oral Q3H PRN Max 3/day Ion FISCHER Holter, DO      Or    OLANZapine  5 mg Intramuscular Q3H PRN Max 3/day Ion FISCHER Holter, DO      OLANZapine  2.5 mg  Oral Q4H PRN Max 6/day Jordan C Holter, DO      ondansetron  4 mg Oral Q6H PRN HOLLI Lion      pantoprazole  20 mg Oral Early Morning Eileen GonzalezmiryamjaylenHOLLI      polyethylene glycol  17 g Oral Daily HOLLI Lion      polyethylene glycol  17 g Oral Daily PRN Jordan C Holter, DO      propranolol  10 mg Oral Q12H KAYLIE HOLLI Lion      senna-docusate sodium  1 tablet Oral Daily PRN Jordan C Holter, DO      senna-docusate sodium  2 tablet Oral BID HOLLI Lion      traZODone  50 mg Oral HS PRN HOLLI Lion      white petrolatum-mineral oil   Topical TID PRN HOLLI Lion         Counseling / Coordination of Care: Total floor / unit time spent today 15 minutes. Greater than 50% of total time was spent with the patient and / or family counseling and / or somewhat receptive to supportive listening and teaching positive coping skills to deal with symptom mangement.     Patient's Rights, confidentiality and exceptions to confidentiality, use of automated medical record, Behavioral Health Services staff access to medical record, and consent to treatment reviewed.    This note has been dictated and hence there may be problems with punctuation, spelling and formatting and if anyone has any concerns please address them to Dr. Rosario   This note is not shared with patient due to potential for making patient's condition worse by knowing the content of the note.

## 2024-08-07 NOTE — SOCIAL WORK
SW checked in with pt; pt appeared distraught.   Pt reports that his sister still has not arrived and he does not know what's going on. Pt requested to call her; SW approved call.

## 2024-08-07 NOTE — PLAN OF CARE
Problem: Alteration in Thoughts and Perception  Goal: Verbalize thoughts and feelings  Description: Interventions:  - Promote a nonjudgmental and trusting relationship with the patient through active listening and therapeutic communication  - Assess patient's level of functioning, behavior and potential for risk  - Engage patient in 1 on 1 interactions  - Encourage patient to express fears, feelings, frustrations, and discuss symptoms    - Warren patient to reality, help patient recognize reality-based thinking   - Administer medications as ordered and assess for potential side effects  - Provide the patient education related to the signs and symptoms of the illness and desired effects of prescribed medications  Outcome: Progressing  Goal: Refrain from acting on delusional thinking/internal stimuli  Description: Interventions:  - Monitor patient closely, per order   - Utilize least restrictive measures   - Set reasonable limits, give positive feedback for acceptable   - Administer medications as ordered and monitor of potential side effects  Outcome: Progressing  Goal: Agree to be compliant with medication regime, as prescribed and report medication side effects  Description: Interventions:  - Offer appropriate PRN medication and supervise ingestion; conduct AIMS, as needed   Outcome: Progressing  Goal: Attend and participate in unit activities, including therapeutic, recreational, and educational groups  Description: Interventions:  -Encourage Visitation and family involvement in care  Outcome: Progressing  Goal: Recognize dysfunctional thoughts, communicate reality-based thoughts at the time of discharge  Description: Interventions:  - Provide medication and psycho-education to assist patient in compliance and developing insight into his/her illness   Outcome: Progressing  Goal: Complete daily ADLs, including personal hygiene independently, as able  Description: Interventions:  - Observe, teach, and assist  patient with ADLS  - Monitor and promote a balance of rest/activity, with adequate nutrition and elimination   Outcome: Progressing     Problem: Ineffective Coping  Goal: Identifies ineffective coping skills  Outcome: Progressing  Goal: Identifies healthy coping skills  Outcome: Progressing  Goal: Demonstrates healthy coping skills  Outcome: Progressing  Goal: Participates in unit activities  Description: Interventions:  - Provide therapeutic environment   - Provide required programming   - Redirect inappropriate behaviors   Outcome: Progressing     Problem: Depression  Goal: Verbalize thoughts and feelings  Description: Interventions:  - Assess and re-assess patient's level of risk   - Engage patient in 1:1 interactions, daily, for a minimum of 15 minutes   - Encourage patient to express feelings, fears, frustrations, hopes   Outcome: Progressing  Goal: Refrain from isolation  Description: Interventions:  - Develop a trusting relationship   - Encourage socialization   Outcome: Progressing     Problem: Alteration in Thoughts and Perception  Goal: Treatment Goal: Gain control of psychotic behaviors/thinking, reduce/eliminate presenting symptoms and demonstrate improved reality functioning upon discharge  Outcome: Not Progressing     Problem: Depression  Goal: Treatment Goal: Demonstrate behavioral control of depressive symptoms, verbalize feelings of improved mood/affect, and adopt new coping skills prior to discharge  Outcome: Not Progressing     Problem: Anxiety  Goal: Anxiety is at manageable level  Description: Interventions:  - Assess and monitor patient's anxiety level.   - Monitor for signs and symptoms (heart palpitations, chest pain, shortness of breath, headaches, nausea, feeling jumpy, restlessness, irritable, apprehensive).   - Collaborate with interdisciplinary team and initiate plan and interventions as ordered.  - [place] patient to unit/surroundings  - Explain treatment plan  - Encourage participation  in care  - Encourage verbalization of concerns/fears  - Identify coping mechanisms  - Assist in developing anxiety-reducing skills  - Administer/offer alternative therapies  - Limit or eliminate stimulants  Outcome: Not Progressing

## 2024-08-07 NOTE — PLAN OF CARE
Problem: Ineffective Coping  Goal: Identifies ineffective coping skills  Outcome: Progressing  Goal: Identifies healthy coping skills  Outcome: Progressing  Goal: Demonstrates healthy coping skills  Outcome: Progressing  Goal: Participates in unit activities  Description: Interventions:  - Provide therapeutic environment   - Provide required programming   - Redirect inappropriate behaviors   Outcome: Progressing    Attended 11/17 for the groups offered during the past 2 days.

## 2024-08-07 NOTE — NURSING NOTE
Alberto is calm, with a blunted and depressed affect. Reports mild depression and anixety. Deneis SI/HI. Reports paranoia and AH that are ongoing saying they are going to kill him and his family. Only drank 25% of miralax saying it makes him going to the bathroom about 5x per day. Visible on the milieu. No unmet needs currently.

## 2024-08-07 NOTE — SOCIAL WORK
SW placed call to pt's sister Marleny. She reports not having a  and attempting to coordinate a better time to visit with pt. Sister requested to call back later with more availability.   No call received as of 2:45pm    Patient spoke to sister and reports she is available tomorrow at 11.

## 2024-08-07 NOTE — PROGRESS NOTES
08/07/24 0745   Team Meeting   Meeting Type Daily Rounds   Team Members Present   Team Members Present Physician;Nurse;;Other (Discipline and Name)   Patient/Family Present   Patient Present No   Patient's Family Present No     In attendance:  Dr. Alex Thomas, MD Dr. Jordan Holter, DO Arin Travis, MIGUEL Alvarado, hospitalsW  Carla Lux, hospitalsW  MATILDA Daniel.S.    Groups: 6/9    Pt sister coming in person for visit at 11am. Pt reports ongoing AH, depression and anxiety. No bx concerns.

## 2024-08-08 LAB
BASOPHILS # BLD AUTO: 0.05 THOUSANDS/ÂΜL (ref 0–0.1)
BASOPHILS NFR BLD AUTO: 1 % (ref 0–1)
BNP SERPL-MCNC: 10 PG/ML (ref 0–100)
CK SERPL-CCNC: 178 U/L (ref 39–308)
CLOZAPINE SERPL-MCNC: 755 NG/ML (ref 350–900)
CRP SERPL QL: 10.7 MG/L
EOSINOPHIL # BLD AUTO: 0.32 THOUSAND/ÂΜL (ref 0–0.61)
EOSINOPHIL NFR BLD AUTO: 3 % (ref 0–6)
ERYTHROCYTE [DISTWIDTH] IN BLOOD BY AUTOMATED COUNT: 13.9 % (ref 11.6–15.1)
HCT VFR BLD AUTO: 44.6 % (ref 36.5–49.3)
HGB BLD-MCNC: 13 G/DL (ref 12–17)
IMM GRANULOCYTES # BLD AUTO: 0.03 THOUSAND/UL (ref 0–0.2)
IMM GRANULOCYTES NFR BLD AUTO: 0 % (ref 0–2)
LYMPHOCYTES # BLD AUTO: 2.92 THOUSANDS/ÂΜL (ref 0.6–4.47)
LYMPHOCYTES NFR BLD AUTO: 30 % (ref 14–44)
MCH RBC QN AUTO: 23.5 PG (ref 26.8–34.3)
MCHC RBC AUTO-ENTMCNC: 29.1 G/DL (ref 31.4–37.4)
MCV RBC AUTO: 81 FL (ref 82–98)
MONOCYTES # BLD AUTO: 0.68 THOUSAND/ÂΜL (ref 0.17–1.22)
MONOCYTES NFR BLD AUTO: 7 % (ref 4–12)
NEUTROPHILS # BLD AUTO: 5.85 THOUSANDS/ÂΜL (ref 1.85–7.62)
NEUTS SEG NFR BLD AUTO: 59 % (ref 43–75)
NRBC BLD AUTO-RTO: 0 /100 WBCS
PLATELET # BLD AUTO: 267 THOUSANDS/UL (ref 149–390)
PMV BLD AUTO: 10.1 FL (ref 8.9–12.7)
RBC # BLD AUTO: 5.54 MILLION/UL (ref 3.88–5.62)
WBC # BLD AUTO: 9.85 THOUSAND/UL (ref 4.31–10.16)

## 2024-08-08 PROCEDURE — 83880 ASSAY OF NATRIURETIC PEPTIDE: CPT

## 2024-08-08 PROCEDURE — 82550 ASSAY OF CK (CPK): CPT

## 2024-08-08 PROCEDURE — 80159 DRUG ASSAY CLOZAPINE: CPT | Performed by: PSYCHIATRY & NEUROLOGY

## 2024-08-08 PROCEDURE — 99232 SBSQ HOSP IP/OBS MODERATE 35: CPT | Performed by: PSYCHIATRY & NEUROLOGY

## 2024-08-08 PROCEDURE — 86140 C-REACTIVE PROTEIN: CPT

## 2024-08-08 PROCEDURE — 85025 COMPLETE CBC W/AUTO DIFF WBC: CPT

## 2024-08-08 RX ORDER — POLYETHYLENE GLYCOL 3350 17 G/17G
17 POWDER, FOR SOLUTION ORAL DAILY PRN
Status: DISCONTINUED | OUTPATIENT
Start: 2024-08-08 | End: 2024-08-08

## 2024-08-08 RX ADMIN — GLYCOPYRROLATE 1 MG: 1 TABLET ORAL at 09:12

## 2024-08-08 RX ADMIN — ARIPIPRAZOLE 15 MG: 15 TABLET ORAL at 09:11

## 2024-08-08 RX ADMIN — ESCITALOPRAM OXALATE 20 MG: 10 TABLET, FILM COATED ORAL at 09:11

## 2024-08-08 RX ADMIN — NICOTINE POLACRILEX 2 MG: 2 GUM, CHEWING ORAL at 09:27

## 2024-08-08 RX ADMIN — NICOTINE POLACRILEX 2 MG: 2 GUM, CHEWING ORAL at 21:16

## 2024-08-08 RX ADMIN — CYANOCOBALAMIN TAB 1000 MCG 1000 MCG: 1000 TAB at 09:12

## 2024-08-08 RX ADMIN — FAMOTIDINE 20 MG: 20 TABLET ORAL at 09:13

## 2024-08-08 RX ADMIN — METFORMIN HYDROCHLORIDE 500 MG: 500 TABLET, FILM COATED ORAL at 17:21

## 2024-08-08 RX ADMIN — SENNOSIDES AND DOCUSATE SODIUM 2 TABLET: 8.6; 5 TABLET ORAL at 17:21

## 2024-08-08 RX ADMIN — NICOTINE POLACRILEX 2 MG: 2 GUM, CHEWING ORAL at 17:21

## 2024-08-08 RX ADMIN — CLOZAPINE 350 MG: 100 TABLET ORAL at 21:15

## 2024-08-08 RX ADMIN — GLYCOPYRROLATE 1 MG: 1 TABLET ORAL at 16:44

## 2024-08-08 RX ADMIN — GLYCOPYRROLATE 1 MG: 1 TABLET ORAL at 21:16

## 2024-08-08 RX ADMIN — LORATADINE 10 MG: 10 TABLET ORAL at 09:13

## 2024-08-08 RX ADMIN — CLOZAPINE 150 MG: 100 TABLET ORAL at 16:44

## 2024-08-08 RX ADMIN — PROPRANOLOL HYDROCHLORIDE 10 MG: 10 TABLET ORAL at 21:16

## 2024-08-08 RX ADMIN — PROPRANOLOL HYDROCHLORIDE 10 MG: 10 TABLET ORAL at 09:12

## 2024-08-08 RX ADMIN — PANTOPRAZOLE SODIUM 20 MG: 20 TABLET, DELAYED RELEASE ORAL at 06:28

## 2024-08-08 RX ADMIN — SENNOSIDES AND DOCUSATE SODIUM 2 TABLET: 8.6; 5 TABLET ORAL at 09:13

## 2024-08-08 RX ADMIN — METFORMIN HYDROCHLORIDE 500 MG: 500 TABLET, FILM COATED ORAL at 09:10

## 2024-08-08 RX ADMIN — AMLODIPINE BESYLATE 5 MG: 5 TABLET ORAL at 09:12

## 2024-08-08 RX ADMIN — HYDROCHLOROTHIAZIDE 12.5 MG: 12.5 TABLET ORAL at 09:11

## 2024-08-08 RX ADMIN — FAMOTIDINE 20 MG: 20 TABLET ORAL at 17:21

## 2024-08-08 NOTE — NURSING NOTE
"Patient reports increased depression, minimal anxiety. Affect is depressed. Pt says he has been having increased passive suicidal thoughts \"to just not be here anymore.\" Says he has no plans and does not want to die. Says he is afraid to live outside of the hospital and has increased paranoia when outside this setting. Reports AH of multiple voices talking to him saying they are going to kill him and his family. Says the voices can be distracting at times. Speaks about onset of hallucinations occurring at age 26 where prior to that he enjoyed dating, working, and \"just living a normal life.\" Mentions voices began after his ex girlfriend cheated on him when he was feeling depressed \"and they just  never went away.\" Visible on the milieu in the evening, socializes with peers. No unmet needs currently.   "

## 2024-08-08 NOTE — PROGRESS NOTES
Psychiatry Progress Note [facility]    Alberto Berumen 28 y.o. male MRN: [id number]  Unit/Bed#: [facility] 108-02 Encounter: [id number]  Code Status: Level 1 - Full Code    PCP: Joce Juan MD    Date of Admission:  3/29/2024 2008   Date of Service:  08/08/24    Patient Active Problem List   Diagnosis    GERD (gastroesophageal reflux disease)    Medical clearance for psychiatric admission    Schizoaffective disorder, bipolar type (HCC)    Tobacco abuse    T wave inversion in EKG    Syringoma    Chronic idiopathic constipation    Vitamin B 12 deficiency    Vitamin D deficiency    Confluent and reticulate papillomatosis    Class 2 obesity in adult    Primary hypertension    Elevated hemoglobin A1c    Bilateral lower extremity edema     Review of systems: Unremarkable but moving bowels more often with MiraLAX and wants to make it as as needed only   assessment  Overall Status: Still with same threatening voices to kill him and his family but claims ECTs have been helpful not aggressive or agitated disappointed over not having visited with sister yesterday but it is rescheduled for today   certification Statement: The patient will continue to require additional inpatient hospital stay due to ongoing voices that are threatening to mixing lack of response to medications and ECT so far.     Medications: Clozapine 350 mg at bedtime and 150 mg at 4 PM, propranolol 10 mg every 12 hours as as needed for anxiety, Abilify 15 mg mg at bedtime Lexapro 20 mg once a day, atropine eyedrops sublingual for drooling of saliva and senna 2 tablets twice a day for constipation  All medications reviewed and recommend they be continued for symptom management side effects from treatment: None reported  Medication changes   None today  Medication education   Risks side effects benefits and precautions of medications discussed with patient and he did verbalize an understanding about risks for metabolic syndrome from being on  neuroleptics and is form tardive dyskinesia etc. and special precautions about being on clozapine   Understanding of medications: Has some understanding   Justification for dual anti-psychotics: Not applicable    Non-pharmacological treatments  Continue with individual, group, milieu and occupational therapy using recovery principles and psycho-education about accepting illness and the need for treatment.   to contact aunt and explore ACT team referral which he never had  ECT to be continued  q 2 weekly for maintenance x 6  Refuses to see a dietician and agrees to do more exercises as he is about 100 lbs overweight   Prolactin level high so Risperdal replaced with Abilify which he has tolerated well so far    Safety  Safety and communication plan established to target dynamic risk factors discussed above.    Discharge Plan   Back to his aunt with an ACT team once ECT is completed    Interval Progress   Still reporting same threatening voices and continues to feel not safe leaving the hospital yet and claims ECTs had apparently helped to cut down on the voices so that he is not too much in his head.  Interacting with select peers keeping appropriate boundaries with an identified female peer.  Wearing hospital clothing and needs reminders to put the clothes in the washer CSF body odor when approached.  No major behavioral issues and no need for behavioral PRNs.  Periodically tends to sleep in the mornings.  Not aggressive or agitated or threatening or self-abusive on the unit compliant with medications.  Waiting for her sister to show up today on the unit   acceptance by patient: Accepting  Hopefulness in recovery: [facility]  Involved in reintegration process: Talking with aunt and siblings who visit with him on the unit   trusting in relationship with psychiatrist: Trusting  Sleep: Good  Appetite: Good  Compliance with Medications: Good  Group attendance: Attending more  7/9  Significant events and progress towards goals: Improving but still with same threatening voices    mental Status Exam  Appearance: age appropriate, improved grooming, looks older than stated age, overweight, with hair in dreadlocks casually dressed with a clean shaven face, fairly groomed with good eye contact wide-awake casually dressed laying in bed  behavior: Not cold and aloof as usual somewhat animated  speech: normal rate, normal volume, normal pitch  Mood: dysphoric, anxious, continues to report feeling good with the ECT   affect: constricted, inappropriate, mood-congruent less constricted with some mood reactivity  thought Process: organized, logical, coherent, goal directed, linear, decreased rate of thoughts, slowing of thoughts, negative thinking, impaired abstract reasoning, concrete  Thought Content: paranoid ideation, some paranoia, grandiose ideas, intrusive thoughts, preoccupied, chronic, continues to report paranoia about people threatening to kill him and his family because of the voices.  He believes ECT has helped so that he is not much in his head.  No other delusions elicited.  No current suicidal or homicidal thoughts and no plans verbalized but today reports having passive death wishes because of voices with no plans and able to CFS and it has not changed yet  .  No phobias obsessions compulsions or distorted body perceptions reported.  Preoccupied with wanting to get ECTs more often despite reminding him that it may impair his memory and finally he agreed to such as it is  every 2 weeks  Perceptual Disturbances: Continues to report some threatening voices to kill him and his family   Hx Risk Factors: chronic psychiatric problems, chronic anxiety symptoms, chronic psychotic symptoms,    Sensorium: Oriented x 3 spheres and situation  Cognition: recent and remote memory grossly intact  Consciousness: alert and awake  Attention: Attention and concentration fair  Intellect: appears to be of  average intelligence  Insight: intact  Judgement: intact  Motor Activity: no abnormal movements     Vitals  Temp:  [97.5 °F (36.4 °C)-97.8 °F (36.6 °C)] 97.8 °F (36.6 °C)  HR:  [] 95  Resp:  [18] 18  BP: (126-131)/(81-88) 126/81  SpO2:  [97 %-99 %] 99 %    Intake/Output Summary (Last 24 hours) at 8/8/2024 0521  Last data filed at 8/7/2024 1740  Gross per 24 hour   Intake 0 ml   Output --   Net 0 ml                     Lab Results: All    Current Facility-Administered Medications   Medication Dose Route Frequency Provider Last Rate    acetaminophen  650 mg Oral Q4H PRN Jordan C Holter, DO      acetaminophen  650 mg Oral Q6H PRN HOLLI Lion      aluminum-magnesium hydroxide-simethicone  30 mL Oral Q4H PRN Jordan C Holter, DO      amLODIPine  5 mg Oral Daily HOLLI Lion      ARIPiprazole  15 mg Oral Daily Bora Rosario MD      Artificial Tears  1 drop Both Eyes Q3H PRN Jordan C Holter,       atropine  1 drop Sublingual Daily PRN HOLLI Lion      haloperidol lactate  2.5 mg Intramuscular Q4H PRN Max 4/day HOLLI Lion      And    LORazepam  1 mg Intramuscular Q4H PRN Max 4/day HOLLI Lion      And    benztropine  0.5 mg Intramuscular Q4H PRN Max 4/day HOLLI Lion      benztropine  1 mg Intramuscular Q4H PRN Max 6/day Jordan C Holter, DO      haloperidol lactate  5 mg Intramuscular Q4H PRN Max 4/day HOLLI Lion      And    LORazepam  2 mg Intramuscular Q4H PRN Max 4/day HOLLI Lion      And    benztropine  1 mg Intramuscular Q4H PRN Max 4/day HOLLI Lion      benztropine  1 mg Oral Q4H PRN Max 6/day HOLLI Lion      benztropine  1 mg Oral Q4H PRN Max 6/day Jordan C Holter,       bisacodyl  10 mg Rectal Daily PRN HOLLI Lion      calcium carbonate  500 mg Oral BID PRN HOLLI Monge      cloZAPine  150 mg Oral Before Dinner Bora Rosario MD      cloZAPine  350 mg Oral HS Bora Rosario MD      cyanocobalamin  1,000 mcg  Oral Daily HOLLI Galvan      hydrOXYzine HCL  50 mg Oral Q6H PRN Max 4/day Ion FISCHER Holter, DO      Or    diphenhydrAMINE  50 mg Intramuscular Q6H PRN Ion FISCHER Holter, DO      hydrOXYzine HCL  50 mg Oral Q6H PRN Max 4/day HOLLI Lion      Or    diphenhydrAMINE  50 mg Intramuscular Q6H PRN HOLLI Lion      diphenhydrAMINE-zinc acetate   Topical BID PRN HOLLI Lion      escitalopram  20 mg Oral Daily HOLLI Lion      famotidine  20 mg Oral BID Eileen GonzalezmiryamjaylenHOLLI      fluticasone  1 spray Each Nare Daily Brina Guillen MD      glycopyrrolate  1 mg Oral TID Bora Rosario MD      haloperidol  1 mg Oral Q6H PRN HOLLI Lion      haloperidol  2.5 mg Oral Q4H PRN Max 4/day HOLLI Lion      haloperidol  5 mg Oral Q4H PRN Max 4/day HOLLI Lion      hydroCHLOROthiazide  12.5 mg Oral Daily HOLLI Galvan      hydrocortisone   Topical 4x Daily PRN HOLLI Lion      hydrOXYzine HCL  100 mg Oral Q6H PRN Max 4/day Ion FISCHER Holter, DO      Or    LORazepam  2 mg Intramuscular Q6H PRN Ion FISCHER Holter, DO      hydrOXYzine HCL  100 mg Oral Q6H PRN Max 4/day HOLLI Lion      Or    LORazepam  2 mg Intramuscular Q6H PRN HOLLI Lion      hydrOXYzine HCL  25 mg Oral Q6H PRN Max 4/day Ion FISCHER Holter, DO      ibuprofen  600 mg Oral Q8H PRN HOLLI Lion      lactated ringers  50 mL/hr Intravenous Continuous Antwan Dong MD      Lidocaine Viscous HCl  15 mL Swish & Spit 4x Daily PRN HOLLI Galvan      loratadine  10 mg Oral Daily Brina Guillen MD      melatonin  3 mg Oral HS PRN Bora Rosario MD      metFORMIN  500 mg Oral BID With Meals HOLLI Galvan      methocarbamol  500 mg Oral Q6H PRN HOLLI Lion      nicotine polacrilex  2 mg Oral Q4H PRN Bora Rosario MD      OLANZapine  5 mg Oral Q4H PRN Max 3/day Jordan C Holter, DO      Or    OLANZapine  2.5 mg Intramuscular Q4H PRN Max 3/day Jordan C Holter, DO       OLANZapine  5 mg Oral Q3H PRN Max 3/day oIn Dupontter, DO      Or    OLANZapine  5 mg Intramuscular Q3H PRN Max 3/day Ion Dupontter, DO      OLANZapine  2.5 mg Oral Q4H PRN Max 6/day Ion Dupontter, DO      ondansetron  4 mg Oral Q6H PRN HOLLI Lion      pantoprazole  20 mg Oral Early Morning Eileen Jensen, HOLLI      polyethylene glycol  17 g Oral Daily HOLLI Lion      polyethylene glycol  17 g Oral Daily PRN Jordan C Holter, DO      propranolol  10 mg Oral Q12H KAYLIE HOLLI Lion      senna-docusate sodium  1 tablet Oral Daily PRN Jordan C Holter, DO      senna-docusate sodium  2 tablet Oral BID HOLLI Lion      traZODone  50 mg Oral HS PRN HOLLI Lion      white petrolatum-mineral oil   Topical TID PRN HOLLI Lion         Counseling / Coordination of Care: Total floor / unit time spent today 15 minutes. Greater than 50% of total time was spent with the patient and / or family counseling and / or somewhat receptive to supportive listening and teaching positive coping skills to deal with symptom mangement.     Patient's Rights, confidentiality and exceptions to confidentiality, use of automated medical record, Behavioral Health Services staff access to medical record, and consent to treatment reviewed.    This note has been dictated and hence there may be problems with punctuation, spelling and formatting and if anyone has any concerns please address them to Dr. Rosario   This note is not shared with patient due to potential for making patient's condition worse by knowing the content of the note.

## 2024-08-08 NOTE — NURSING NOTE
Alberto is calm, sleeping in bed throughout most of the early afternoon. Reprts mild anxiety and depression. Denies SI/HIVH. Reports ongoing AH of voices saying they are going to kill him and his family. Endorses paranoid ideation related to this. Blunted in affect, no unmet needs currently.

## 2024-08-08 NOTE — PLAN OF CARE
Problem: Alteration in Thoughts and Perception  Goal: Verbalize thoughts and feelings  Description: Interventions:  - Promote a nonjudgmental and trusting relationship with the patient through active listening and therapeutic communication  - Assess patient's level of functioning, behavior and potential for risk  - Engage patient in 1 on 1 interactions  - Encourage patient to express fears, feelings, frustrations, and discuss symptoms    - [place] patient to reality, help patient recognize reality-based thinking   - Administer medications as ordered and assess for potential side effects  - Provide the patient education related to the signs and symptoms of the illness and desired effects of prescribed medications  Outcome: Progressing  Goal: Refrain from acting on delusional thinking/internal stimuli  Description: Interventions:  - Monitor patient closely, per order   - Utilize least restrictive measures   - Set reasonable limits, give positive feedback for acceptable   - Administer medications as ordered and monitor of potential side effects  Outcome: Progressing  Goal: Agree to be compliant with medication regime, as prescribed and report medication side effects  Description: Interventions:  - Offer appropriate PRN medication and supervise ingestion; conduct AIMS, as needed   Outcome: Progressing  Goal: Attend and participate in unit activities, including therapeutic, recreational, and educational groups  Description: Interventions:  -Encourage Visitation and family involvement in care  Outcome: Progressing  Goal: Recognize dysfunctional thoughts, communicate reality-based thoughts at the time of discharge  Description: Interventions:  - Provide medication and psycho-education to assist patient in compliance and developing insight into his/her illness   Outcome: Progressing  Goal: Complete daily ADLs, including personal hygiene independently, as able  Description: Interventions:  - Observe, teach, and assist  patient with ADLS  - Monitor and promote a balance of rest/activity, with adequate nutrition and elimination   Outcome: Progressing     Problem: Ineffective Coping  Goal: Identifies ineffective coping skills  Outcome: Progressing  Goal: Identifies healthy coping skills  Outcome: Progressing  Goal: Demonstrates healthy coping skills  Outcome: Progressing  Goal: Participates in unit activities  Description: Interventions:  - Provide therapeutic environment   - Provide required programming   - Redirect inappropriate behaviors   Outcome: Progressing     Problem: Depression  Goal: Refrain from isolation  Description: Interventions:  - Develop a trusting relationship   - Encourage socialization   Outcome: Progressing     Problem: Alteration in Thoughts and Perception  Goal: Treatment Goal: Gain control of psychotic behaviors/thinking, reduce/eliminate presenting symptoms and demonstrate improved reality functioning upon discharge  Outcome: Not Progressing     Problem: Depression  Goal: Treatment Goal: Demonstrate behavioral control of depressive symptoms, verbalize feelings of improved mood/affect, and adopt new coping skills prior to discharge  Outcome: Not Progressing     Problem: Anxiety  Goal: Anxiety is at manageable level  Description: Interventions:  - Assess and monitor patient's anxiety level.   - Monitor for signs and symptoms (heart palpitations, chest pain, shortness of breath, headaches, nausea, feeling jumpy, restlessness, irritable, apprehensive).   - Collaborate with interdisciplinary team and initiate plan and interventions as ordered.  - [place] patient to unit/surroundings  - Explain treatment plan  - Encourage participation in care  - Encourage verbalization of concerns/fears  - Identify coping mechanisms  - Assist in developing anxiety-reducing skills  - Administer/offer alternative therapies  - Limit or eliminate stimulants  Outcome: Not Progressing

## 2024-08-08 NOTE — PROGRESS NOTES
08/08/24 0727   Team Meeting   Meeting Type Daily Rounds   Team Members Present   Team Members Present Physician;Nurse;;Other (Discipline and Name)   Patient/Family Present   Patient Present No   Patient's Family Present No     In attendance:  Dr. Alex Thomas, MD Dr. Jordan Holter, DO Wanda Wang, RN  Luisana Alvarado, [facility]  Carla Lux, [facility]  Irais Mcdowell, M.S.    Groups: 7/9    Pt social with select peers. Pt reports ongoing AH and sadness regarding a female peer that left. Pt hoping for a visit with his sister; has been postponed.

## 2024-08-08 NOTE — SOCIAL WORK
SW checked in with pt. Pt reports his sister is continuing to have issues finding a  in order to see him in person on the unit. Pt requested to change the time on her behalf x2, approved, then she was not able to come at all. SW and pt processed his disappointment. SW will work with sister to attempt to set up a time for next week.

## 2024-08-08 NOTE — NURSING NOTE
Patient  has been visible all shift. Social with select peers (female patient s.c.) Appetite good. Attended 7/9 groups today. Medication compliant. Smiles on approach. Continues to say he has auditory hallucinations to kill him and his family.  Disheveled appearance. Denies suicidal ideations.

## 2024-08-09 PROCEDURE — 99232 SBSQ HOSP IP/OBS MODERATE 35: CPT | Performed by: PSYCHIATRY & NEUROLOGY

## 2024-08-09 RX ORDER — CLOZAPINE 200 MG/1
200 TABLET ORAL
Status: DISCONTINUED | OUTPATIENT
Start: 2024-08-09 | End: 2024-09-29

## 2024-08-09 RX ADMIN — METFORMIN HYDROCHLORIDE 500 MG: 500 TABLET, FILM COATED ORAL at 17:05

## 2024-08-09 RX ADMIN — PROPRANOLOL HYDROCHLORIDE 10 MG: 10 TABLET ORAL at 21:37

## 2024-08-09 RX ADMIN — LORATADINE 10 MG: 10 TABLET ORAL at 09:04

## 2024-08-09 RX ADMIN — AMLODIPINE BESYLATE 5 MG: 5 TABLET ORAL at 09:04

## 2024-08-09 RX ADMIN — FAMOTIDINE 20 MG: 20 TABLET ORAL at 17:05

## 2024-08-09 RX ADMIN — SENNOSIDES AND DOCUSATE SODIUM 2 TABLET: 8.6; 5 TABLET ORAL at 09:04

## 2024-08-09 RX ADMIN — CYANOCOBALAMIN TAB 1000 MCG 1000 MCG: 1000 TAB at 09:04

## 2024-08-09 RX ADMIN — ARIPIPRAZOLE 15 MG: 15 TABLET ORAL at 09:04

## 2024-08-09 RX ADMIN — CLOZAPINE 200 MG: 200 TABLET ORAL at 17:05

## 2024-08-09 RX ADMIN — FLUTICASONE PROPIONATE 1 SPRAY: 50 SPRAY, METERED NASAL at 09:06

## 2024-08-09 RX ADMIN — NICOTINE POLACRILEX 2 MG: 2 GUM, CHEWING ORAL at 18:03

## 2024-08-09 RX ADMIN — METFORMIN HYDROCHLORIDE 500 MG: 500 TABLET, FILM COATED ORAL at 09:04

## 2024-08-09 RX ADMIN — PROPRANOLOL HYDROCHLORIDE 10 MG: 10 TABLET ORAL at 09:04

## 2024-08-09 RX ADMIN — CLOZAPINE 300 MG: 200 TABLET ORAL at 21:37

## 2024-08-09 RX ADMIN — HYDROCHLOROTHIAZIDE 12.5 MG: 12.5 TABLET ORAL at 09:04

## 2024-08-09 RX ADMIN — SENNOSIDES AND DOCUSATE SODIUM 2 TABLET: 8.6; 5 TABLET ORAL at 17:05

## 2024-08-09 RX ADMIN — GLYCOPYRROLATE 1 MG: 1 TABLET ORAL at 17:05

## 2024-08-09 RX ADMIN — GLYCOPYRROLATE 1 MG: 1 TABLET ORAL at 21:37

## 2024-08-09 RX ADMIN — ESCITALOPRAM OXALATE 20 MG: 10 TABLET, FILM COATED ORAL at 09:04

## 2024-08-09 RX ADMIN — NICOTINE POLACRILEX 2 MG: 2 GUM, CHEWING ORAL at 13:46

## 2024-08-09 RX ADMIN — FAMOTIDINE 20 MG: 20 TABLET ORAL at 09:03

## 2024-08-09 RX ADMIN — GLYCOPYRROLATE 1 MG: 1 TABLET ORAL at 09:04

## 2024-08-09 RX ADMIN — PANTOPRAZOLE SODIUM 20 MG: 20 TABLET, DELAYED RELEASE ORAL at 06:23

## 2024-08-09 RX ADMIN — NICOTINE POLACRILEX 2 MG: 2 GUM, CHEWING ORAL at 09:28

## 2024-08-09 NOTE — NURSING NOTE
Patient is calm with a less depressed and less blunted affect, did not get up for breakfast in time. Given snack with morning meds to prevent GI upset with metformin. Patient did get up then and out speaking with peers in the morning. Reports ongoing mild depressed mood, paranoia in regards to voices, and AH telling him they are going to kill him and his family. Denies SI/HI/VH. No unmet needs currently.

## 2024-08-09 NOTE — PLAN OF CARE
Problem: Ineffective Coping  Goal: Identifies ineffective coping skills  Outcome: Progressing  Goal: Identifies healthy coping skills  Outcome: Progressing  Goal: Demonstrates healthy coping skills  Outcome: Progressing  Goal: Participates in unit activities  Description: Interventions:  - Provide therapeutic environment   - Provide required programming   - Redirect inappropriate behaviors   Outcome: Progressing    Attended 21/36 of the groups offered during the past 4 days.

## 2024-08-09 NOTE — PLAN OF CARE
Problem: Alteration in Thoughts and Perception  Goal: Verbalize thoughts and feelings  Description: Interventions:  - Promote a nonjudgmental and trusting relationship with the patient through active listening and therapeutic communication  - Assess patient's level of functioning, behavior and potential for risk  - Engage patient in 1 on 1 interactions  - Encourage patient to express fears, feelings, frustrations, and discuss symptoms    - [place] patient to reality, help patient recognize reality-based thinking   - Administer medications as ordered and assess for potential side effects  - Provide the patient education related to the signs and symptoms of the illness and desired effects of prescribed medications  Outcome: Progressing  Goal: Refrain from acting on delusional thinking/internal stimuli  Description: Interventions:  - Monitor patient closely, per order   - Utilize least restrictive measures   - Set reasonable limits, give positive feedback for acceptable   - Administer medications as ordered and monitor of potential side effects  Outcome: Progressing  Goal: Agree to be compliant with medication regime, as prescribed and report medication side effects  Description: Interventions:  - Offer appropriate PRN medication and supervise ingestion; conduct AIMS, as needed   Outcome: Progressing  Goal: Attend and participate in unit activities, including therapeutic, recreational, and educational groups  Description: Interventions:  -Encourage Visitation and family involvement in care  Outcome: Progressing     Problem: Ineffective Coping  Goal: Identifies ineffective coping skills  Outcome: Progressing  Goal: Identifies healthy coping skills  Outcome: Progressing  Goal: Demonstrates healthy coping skills  Outcome: Progressing  Goal: Participates in unit activities  Description: Interventions:  - Provide therapeutic environment   - Provide required programming   - Redirect inappropriate behaviors   Outcome:  Progressing     Problem: Depression  Goal: Refrain from harming self  Description: Interventions:  - Monitor patient closely, per order   - Supervise medication ingestion, monitor effects and side effects   Outcome: Progressing  Goal: Refrain from isolation  Description: Interventions:  - Develop a trusting relationship   - Encourage socialization   Outcome: Progressing     Problem: Anxiety  Goal: Anxiety is at manageable level  Description: Interventions:  - Assess and monitor patient's anxiety level.   - Monitor for signs and symptoms (heart palpitations, chest pain, shortness of breath, headaches, nausea, feeling jumpy, restlessness, irritable, apprehensive).   - Collaborate with interdisciplinary team and initiate plan and interventions as ordered.  - [place] patient to unit/surroundings  - Explain treatment plan  - Encourage participation in care  - Encourage verbalization of concerns/fears  - Identify coping mechanisms  - Assist in developing anxiety-reducing skills  - Administer/offer alternative therapies  - Limit or eliminate stimulants  Outcome: Progressing     Problem: Alteration in Thoughts and Perception  Goal: Treatment Goal: Gain control of psychotic behaviors/thinking, reduce/eliminate presenting symptoms and demonstrate improved reality functioning upon discharge  Outcome: Not Progressing  Goal: Recognize dysfunctional thoughts, communicate reality-based thoughts at the time of discharge  Description: Interventions:  - Provide medication and psycho-education to assist patient in compliance and developing insight into his/her illness   Outcome: Not Progressing     Problem: Depression  Goal: Treatment Goal: Demonstrate behavioral control of depressive symptoms, verbalize feelings of improved mood/affect, and adopt new coping skills prior to discharge  Outcome: Not Progressing

## 2024-08-09 NOTE — PROGRESS NOTES
08/09/24 0751   Team Meeting   Meeting Type Daily Rounds   Team Members Present   Team Members Present Physician;Nurse;;Other (Discipline and Name)   Patient/Family Present   Patient Present No   Patient's Family Present No     In attendance:  Dr. Alex Thomas, MD Dr. Jordan Holter, DO Arin Travis, RN  Luisana Alvarado, [facility]  Carla Lux, [facility]  MATILDA Daniel.S.    Groups: 3/10    Sister did not visit. Pt social with select peers. Pt reports AH are the same; passive SI. Pt reports fears about discharge. Pt had labs completed.

## 2024-08-09 NOTE — PROGRESS NOTES
"Progress Note - Behavioral Health     Alberto Berumen 28 y.o. male MRN: [id number]  Unit/Bed#: [facility] 108-02 Encounter: [id number]      Alberto Berumen was seen for continuing care and reviewed with treatment team.  Pt was cooperative but withdrawn on approach.  He reported feeling Depressed and anxious, and also \"I'm Paranoid about going home\" due to the AH of voices telling him they will kill him or his family.  Some days the AH are more frequent and intense than other days, but not commanding.   His last SI was 1 week ago without plan or intent and he presently has no SI and verbally contracts for safety.  He feels the ECT has tanisha helpful and plans to continue it.  He has some residual memory deficit from the ECT but generally tolerates the treatment well.  Pt denied any present SI/intent/plan, HI, anxiety, CAH, VH, TH, or OH.  He reports sleeping and eating well, to which nursing concurs.           Per EMR and floor team, Pt has been calm and medication compliant.  He is visible and selectively social in the milieu.  He attends most therapeutic group activities with appropriate behaviors among peers.    Sleep:Good  Appetite: Good   Medication side effects: None per Pt    ROS: No complaints per Pt, (All ROS Negative)    Labs/Tests: Reviewed    Mental Status Evaluation:  Appearance:  Dressed, clean, Poor eye contact   Behavior:  Calm, cooperative, pleasant, Withdrawn   Speech:  Clear, normal rate and volume, but not very spontaneous   Mood:  Anxious, Depressed   Affect:  Blunted   Thought Process:  Organized, Goal directed   Associations: Intact associations   Thought Content:  Paranoid ideation   Perceptual Disturbances: +AH but no CAH, VH, TH, or OH.  He is paranoid but does not appear to be RIS   Risk Potential: Pt presently denies SI or HI    Sensorium:  Self, birthday, Place, Day of the week, Month, Year   Memory:  short term memory grossly intact   Consciousness:  alert, awake   Attention: Good   Insight:  " Fair   Judgment: Good   Gait/Station: Normal gait/station   Motor Activity: No abnormal movements     Vitals:    08/09/24 1458 08/09/24 2100 08/10/24 0859 08/10/24 1540   BP: 148/85 131/88 123/80 134/79   BP Location: Right arm Left arm Left arm Right arm   Pulse: 100 105 103 100   Resp: 18  18 18   Temp: 98.2 °F (36.8 °C)  (!) 97.3 °F (36.3 °C) 97.7 °F (36.5 °C)   TempSrc: Skin  Skin Temporal   SpO2: 99%   99%   Weight:       Height:           Labwork: I have personally reviewed all pertinent laboratory/tests results.  EKG   Lab Results   Component Value Date    VENTRATE 98 07/16/2024    ATRIALRATE 98 07/16/2024    PRINT 142 07/16/2024    QRSDINT 92 07/16/2024    QTINT 354 07/16/2024    QTCINT 451 07/16/2024    PAXIS 49 07/16/2024    QRSAXIS 43 07/16/2024    TWAVEAXIS 3 07/16/2024     Imaging Studies: No results found.  Recent Results (from the past 160 hour(s))   B-Type Natriuretic Peptide(BNP)    Collection Time: 08/08/24  7:41 PM   Result Value Ref Range    BNP 10 0 - 100 pg/mL   C-reactive protein    Collection Time: 08/08/24  7:41 PM   Result Value Ref Range    CRP 10.7 (H) <3.0 mg/L   CK    Collection Time: 08/08/24  7:41 PM   Result Value Ref Range    Total  39 - 308 U/L   CBC and differential    Collection Time: 08/08/24  7:41 PM   Result Value Ref Range    WBC 9.85 4.31 - 10.16 Thousand/uL    RBC 5.54 3.88 - 5.62 Million/uL    Hemoglobin 13.0 12.0 - 17.0 g/dL    Hematocrit 44.6 36.5 - 49.3 %    MCV 81 (L) 82 - 98 fL    MCH 23.5 (L) 26.8 - 34.3 pg    MCHC 29.1 (L) 31.4 - 37.4 g/dL    RDW 13.9 11.6 - 15.1 %    MPV 10.1 8.9 - 12.7 fL    Platelets 267 149 - 390 Thousands/uL    nRBC 0 /100 WBCs    Segmented % 59 43 - 75 %    Immature Grans % 0 0 - 2 %    Lymphocytes % 30 14 - 44 %    Monocytes % 7 4 - 12 %    Eosinophils Relative 3 0 - 6 %    Basophils Relative 1 0 - 1 %    Absolute Neutrophils 5.85 1.85 - 7.62 Thousands/µL    Absolute Immature Grans 0.03 0.00 - 0.20 Thousand/uL    Absolute Lymphocytes  2.92 0.60 - 4.47 Thousands/µL    Absolute Monocytes 0.68 0.17 - 1.22 Thousand/µL    Eosinophils Absolute 0.32 0.00 - 0.61 Thousand/µL    Basophils Absolute 0.05 0.00 - 0.10 Thousands/µL   Clozapine-[facility]    Collection Time: 08/08/24  7:41 PM   Result Value Ref Range    Clozapine Lvl 755 350 - 900 ng/mL        Progress Toward Goals:  Based on today's interview and review of prior notes, Pt is making gradual progress.  Dosage split of Clozapine was reapportioned yesterday and will observe for effect  Continue the present medication regimen  Continue maintenance ECT  CBD with diff, CRP, BNP, CK weekly on Thursdays for monitoring on Clozapine  Pt remains on dual antipsychotic Tx due to failure on monotherapy      Primary team plans for eventual discharge back to his aunt's house with an ACT team      Assessment & Plan   Principal Problem:    Schizoaffective disorder, bipolar type (HCC)  Active Problems:    GERD (gastroesophageal reflux disease)    Medical clearance for psychiatric admission    Tobacco abuse    T wave inversion in EKG    Chronic idiopathic constipation    Confluent and reticulate papillomatosis    Primary hypertension    Elevated hemoglobin A1c    Bilateral lower extremity edema      Recommended Treatment: Continue with pharmacotherapy, group therapy, milieu therapy and occupational therapy.  The patient will be maintained on the following medications:  Current Facility-Administered Medications   Medication Dose Route Frequency Provider Last Rate    acetaminophen  650 mg Oral Q4H PRN Jordan C Holter,       acetaminophen  650 mg Oral Q6H PRN HOLLI Lion      aluminum-magnesium hydroxide-simethicone  30 mL Oral Q4H PRN Jordan C Holter, DO      amLODIPine  5 mg Oral Daily HOLLI Lion      ARIPiprazole  15 mg Oral Daily Bora Rosario MD      Artificial Tears  1 drop Both Eyes Q3H PRN Jordan C Holter, DO      atropine  1 drop Sublingual Daily PRN HOLLI Lion      haloperidol lactate   2.5 mg Intramuscular Q4H PRN Max 4/day STEVE LionNP      And    LORazepam  1 mg Intramuscular Q4H PRN Max 4/day HOLLI Lion      And    benztropine  0.5 mg Intramuscular Q4H PRN Max 4/day Eveline Hunt, STEVENP      benztropine  1 mg Intramuscular Q4H PRN Max 6/day Ion Dupontter, DO      haloperidol lactate  5 mg Intramuscular Q4H PRN Max 4/day HOLLI Lion      And    LORazepam  2 mg Intramuscular Q4H PRN Max 4/day STEVE LionNP      And    benztropine  1 mg Intramuscular Q4H PRN Max 4/day HOLLI Lion      benztropine  1 mg Oral Q4H PRN Max 6/day HOLLI Lion      benztropine  1 mg Oral Q4H PRN Max 6/day Jordan C Holter, DO      bisacodyl  10 mg Rectal Daily PRN HOLLI Lion      calcium carbonate  500 mg Oral BID PRN HOLLI Monge      cloZAPine  200 mg Oral Before Dinner Bora Rosario MD      cloZAPine  300 mg Oral HS Bora Rosario MD      cyanocobalamin  1,000 mcg Oral Daily HOLLI Galvan      hydrOXYzine HCL  50 mg Oral Q6H PRN Max 4/day Jordan C Holter, DO      Or    diphenhydrAMINE  50 mg Intramuscular Q6H PRN Jordan C Holter, DO      hydrOXYzine HCL  50 mg Oral Q6H PRN Max 4/day HOLLI Lion      Or    diphenhydrAMINE  50 mg Intramuscular Q6H PRN HOLLI Lion      diphenhydrAMINE-zinc acetate   Topical BID PRN HOLLI Lion      escitalopram  20 mg Oral Daily HOLLI Lion      famotidine  20 mg Oral BID HOLLI Monge      fluticasone  1 spray Each Nare Daily Brina Guillen MD      glycopyrrolate  1 mg Oral TID Bora Rosario MD      haloperidol  1 mg Oral Q6H PRN HOLLI Lion      haloperidol  2.5 mg Oral Q4H PRN Max 4/day HOLLI Lion      haloperidol  5 mg Oral Q4H PRN Max 4/day Eveline Hangey, CRNP      hydroCHLOROthiazide  12.5 mg Oral Daily Pia Mantilla, HOLLI      hydrocortisone   Topical 4x Daily PRN HOLLI Lion      hydrOXYzine HCL  100 mg Oral Q6H PRN Max 4/day Ion FISCHER  Holter, DO      Or    LORazepam  2 mg Intramuscular Q6H PRN Ion  Holter, DO      hydrOXYzine HCL  100 mg Oral Q6H PRN Max 4/day HOLLI Lion      Or    LORazepam  2 mg Intramuscular Q6H PRN HOLLI Lion      hydrOXYzine HCL  25 mg Oral Q6H PRN Max 4/day [facility] Holter, DO      ibuprofen  600 mg Oral Q8H PRN HOLLI Lion      lactated ringers  50 mL/hr Intravenous Continuous Antwan Dong MD      Lidocaine Viscous HCl  15 mL Swish & Spit 4x Daily PRN HOLLI Galvan      loratadine  10 mg Oral Daily Brina Guillen MD      melatonin  3 mg Oral HS PRN Bora Rosario MD      metFORMIN  500 mg Oral BID With Meals HOLLI Galvan      methocarbamol  500 mg Oral Q6H PRN HOLLI Lion      nicotine polacrilex  2 mg Oral Q4H PRN Bora Rosario MD      OLANZapine  5 mg Oral Q4H PRN Max 3/day [facility] Holter, DO      Or    OLANZapine  2.5 mg Intramuscular Q4H PRN Max 3/day [facility] Holter, DO      OLANZapine  5 mg Oral Q3H PRN Max 3/day [facility] Holter, DO      Or    OLANZapine  5 mg Intramuscular Q3H PRN Max 3/day [facility] Holter, DO      OLANZapine  2.5 mg Oral Q4H PRN Max 6/day [facility] Holter, DO      ondansetron  4 mg Oral Q6H PRN HOLLI Lion      pantoprazole  20 mg Oral Early Morning HOLLI Monge      polyethylene glycol  17 g Oral Daily PRN [facility] Holter, DO      propranolol  10 mg Oral Q12H KAYLIE HOLIL Lion      senna-docusate sodium  1 tablet Oral Daily PRN Jordan C Holter, DO      senna-docusate sodium  2 tablet Oral BID HOLLI Lion      traZODone  50 mg Oral HS PRN HOLLI Lion      white petrolatum-mineral oil   Topical TID PRN HOLLI Lion         Risks, benefits and possible side effects of Medications:   Risks, benefits, and possible side effects of medications explained to patient and patient verbalizes understanding

## 2024-08-09 NOTE — PROGRESS NOTES
Psychiatry Progress Note [facility]    Alberto Berumen 28 y.o. male MRN: [id number]  Unit/Bed#: -02 Encounter: [id number]  Code Status: Level 1 - Full Code    PCP: Joce Juan MD    Date of Admission:  3/29/2024 2008   Date of Service:  08/09/24    Patient Active Problem List   Diagnosis    GERD (gastroesophageal reflux disease)    Medical clearance for psychiatric admission    Schizoaffective disorder, bipolar type (HCC)    Tobacco abuse    T wave inversion in EKG    Syringoma    Chronic idiopathic constipation    Vitamin B 12 deficiency    Vitamin D deficiency    Confluent and reticulate papillomatosis    Class 2 obesity in adult    Primary hypertension    Elevated hemoglobin A1c    Bilateral lower extremity edema     Review of systems: Unremarkable    assessment  Overall Status: Continues to report same threatening voices that tell him that they are going to kill him and his family and he feels safe in the hospital and believes ECTs have definitely helped him.  He claims he has been struggling with voices for over 2 years now and they are not commanding but annoying and threatening  certification Statement: The patient will continue to require additional inpatient hospital stay due to ongoing voices that are threatening to mixing lack of response to medications and ECT so far.     Medications: Clozapine 350 mg at bedtime and 150 mg at 4 PM, propranolol 10 mg every 12 hours as as needed for anxiety, Abilify 15 mg mg at bedtime Lexapro 20 mg once a day, atropine eyedrops sublingual for drooling of saliva and senna 2 tablets twice a day for constipation  All medications were reviewed and recommend they be continued for symptom management side effects from treatment: None reported  Medication changes   Will change by increasing clozapine as 200 mg at 4 PM and decreasing 300 mg at bedtime as he is still sleepy in the morning  Medication education   Risks side effects benefits and  precautions of medications discussed with patient and he did verbalize an understanding about risks for metabolic syndrome from being on neuroleptics and is form tardive dyskinesia etc. and special precautions about being on clozapine   Understanding of medications: Has some understanding   Justification for dual anti-psychotics: Not applicable    Non-pharmacological treatments  Continue with individual, group, milieu and occupational therapy using recovery principles and psycho-education about accepting illness and the need for treatment.   to contact aunt and explore ACT team referral which he never had  ECT to be continued  q 2 weekly for maintenance x 6  Refuses to see a dietician and agrees to do more exercises as he is about 100 lbs overweight   Prolactin level high so Risperdal replaced with Abilify which he has tolerated well so far    Safety  Safety and communication plan established to target dynamic risk factors discussed above.    Discharge Plan   Back to his aunt with an ACT team once ECT is completed    Interval Progress   Still reporting same threatening voices and not feeling safe if he leaves the hospital but then states ECTs that apparently decreased the voices and made him stay out of his head that he has been struggling with the voices for at least 2 years.  Interacting with select peers keeping appropriate boundaries with an adequate female peer.  Wearing hospital clothing and needs reminders to put the clothes in the washer as he has a body odor sometimes.  No major behavioral issues and no need for behavioral PRNs reported and not aggressive or agitated or threatening or self-abusive on the unit and compliant with medications.  Looking forward to having maintenance ECTs every 2 weeks as planned, was laughing with a former patient on the phone    acceptance by patient: Accepting  Hopefulness in recovery: Living with his aunt in the [facility]  Involved in reintegration process:  Talking with aunt and siblings her visit with him on the unit   trusting in relationship with psychiatrist: Trusting  Sleep: Good  Appetite: Good  Compliance with Medications: Good  Group attendance: Attending  3/10  Significant events and progress towards goals: Improving but still hearing same threatening voices    mental Status Exam  Appearance: age appropriate, improved grooming, looks older than stated age, overweight, with hair in dreadlocks casually dressed with a clean shaven face, fairly groomed with good eye contact was found sitting up on bed eating cereal and banana as he missed breakfast this morning  behavior: Somewhat withdrawn but has good eye contact  speech: normal rate, normal volume, normal pitch  Mood: dysphoric, anxious, continues to report feeling good with the ECT   affect: constricted, inappropriate, mood-congruent less constricted with some mood reactivity  thought Process: organized, logical, coherent, goal directed, linear, decreased rate of thoughts, slowing of thoughts, negative thinking, impaired abstract reasoning, concrete  Thought Content: paranoid ideation, some paranoia, grandiose ideas, intrusive thoughts, preoccupied, chronic, continues to report paranoia about people threatening to kill him and his family because of the voices.  He believes ECT has helped so that he is not much in his head.  No other delusions elicited.  No current suicidal or homicidal thoughts and no plans verbalized but today reports having passive death wishes because of voices with no plans and able to CFS and it has not changed yet  .  No phobias obsessions compulsions or distorted body perceptions reported.  Preoccupied with wanting to get ECTs more often despite reminding him that it may impair his memory and finally he agreed to such as it is  every 2 weeks  Perceptual Disturbances: Continues to report some threatening voices to kill him and his family   Hx Risk Factors: chronic psychiatric problems,  chronic anxiety symptoms, chronic psychotic symptoms,    Sensorium: Oriented x 3 spheres and situation   cognition: recent and remote memory grossly intact  Consciousness: alert and awake  Attention: Attention and concentration span fair  Intellect: appears to be of average intelligence  Insight: intact  Judgement: intact  Motor Activity: no abnormal movements     Vitals  Temp:  [97.7 °F (36.5 °C)-97.8 °F (36.6 °C)] 97.8 °F (36.6 °C)  HR:  [] 100  Resp:  [18] 18  BP: (128-142)/(70-94) 135/94  SpO2:  [95 %-96 %] 96 %  No intake or output data in the 24 hours ending 08/09/24 0526  Lab Results: All labs reviewed and prolactin level came down to normal on 8/1/2024, clozapine level 755 yesterday     Current Facility-Administered Medications   Medication Dose Route Frequency Provider Last Rate    acetaminophen  650 mg Oral Q4H PRN Jordan C Holter, DO      acetaminophen  650 mg Oral Q6H PRN HOLLI Lion      aluminum-magnesium hydroxide-simethicone  30 mL Oral Q4H PRN Jordan C Holter, DO      amLODIPine  5 mg Oral Daily HOLLI Lion      ARIPiprazole  15 mg Oral Daily Bora Rosario MD      Artificial Tears  1 drop Both Eyes Q3H PRN Jordan C Holter, DO      atropine  1 drop Sublingual Daily PRN HOLLI iLon      haloperidol lactate  2.5 mg Intramuscular Q4H PRN Max 4/day HOLLI Lion      And    LORazepam  1 mg Intramuscular Q4H PRN Max 4/day HOLLI Lion      And    benztropine  0.5 mg Intramuscular Q4H PRN Max 4/day HOLLI Lion      benztropine  1 mg Intramuscular Q4H PRN Max 6/day Jordan C Holter, DO      haloperidol lactate  5 mg Intramuscular Q4H PRN Max 4/day HOLLI Lion      And    LORazepam  2 mg Intramuscular Q4H PRN Max 4/day HOLLI Lion      And    benztropine  1 mg Intramuscular Q4H PRN Max 4/day HOLLI Lion      benztropine  1 mg Oral Q4H PRN Max 6/day HOLLI Lion      benztropine  1 mg Oral Q4H PRN Max 6/day Jordan C Holter, DO       bisacodyl  10 mg Rectal Daily PRN HOLLI Lion      calcium carbonate  500 mg Oral BID PRN HOLLI Monge      cloZAPine  150 mg Oral Before Dinner Bora Rosario MD      cloZAPine  350 mg Oral HS Bora Rosario MD      cyanocobalamin  1,000 mcg Oral Daily HOLLI Galvan      hydrOXYzine HCL  50 mg Oral Q6H PRN Max 4/day Jordan C Holter, DO      Or    diphenhydrAMINE  50 mg Intramuscular Q6H PRN Jordan C Holter, DO      hydrOXYzine HCL  50 mg Oral Q6H PRN Max 4/day HOLLI Lion      Or    diphenhydrAMINE  50 mg Intramuscular Q6H PRN HOLLI Lion      diphenhydrAMINE-zinc acetate   Topical BID PRN HOLLI Lion      escitalopram  20 mg Oral Daily HOLLI Lion      famotidine  20 mg Oral BID HOLLI Monge      fluticasone  1 spray Each Nare Daily Brina Guillen MD      glycopyrrolate  1 mg Oral TID Bora Rosario MD      haloperidol  1 mg Oral Q6H PRN HOLLI Lion      haloperidol  2.5 mg Oral Q4H PRN Max 4/day HOLLI Lion      haloperidol  5 mg Oral Q4H PRN Max 4/day HOLLI Lion      hydroCHLOROthiazide  12.5 mg Oral Daily HOLLI Galvan      hydrocortisone   Topical 4x Daily PRN HOLLI Lion      hydrOXYzine HCL  100 mg Oral Q6H PRN Max 4/day Jordan C Holter, DO      Or    LORazepam  2 mg Intramuscular Q6H PRN Jordan C Holter, DO      hydrOXYzine HCL  100 mg Oral Q6H PRN Max 4/day HOLLI Lion      Or    LORazepam  2 mg Intramuscular Q6H PRN HOLLI Lion      hydrOXYzine HCL  25 mg Oral Q6H PRN Max 4/day Jordan C Holter, DO      ibuprofen  600 mg Oral Q8H PRN HOLLI Lion      lactated ringers  50 mL/hr Intravenous Continuous Antwan Dong MD      Lidocaine Viscous HCl  15 mL Swish & Spit 4x Daily PRN HOLLI Galvan      loratadine  10 mg Oral Daily Brina Guillen MD      melatonin  3 mg Oral HS PRN Bora Rosario MD      metFORMIN  500 mg Oral BID With Meals HOLLI Galvan       methocarbamol  500 mg Oral Q6H PRN HOLLI Lion      nicotine polacrilex  2 mg Oral Q4H PRN Bora Rosario MD      OLANZapine  5 mg Oral Q4H PRN Max 3/day [facility] Holter, DO      Or    OLANZapine  2.5 mg Intramuscular Q4H PRN Max 3/day [facility] Holter, DO      OLANZapine  5 mg Oral Q3H PRN Max 3/day [facility] Holter, DO      Or    OLANZapine  5 mg Intramuscular Q3H PRN Max 3/day [facility] Holter, DO      OLANZapine  2.5 mg Oral Q4H PRN Max 6/day [facility] Holter, DO      ondansetron  4 mg Oral Q6H PRN HOLLI Lion      pantoprazole  20 mg Oral Early Morning HOLLI Monge      polyethylene glycol  17 g Oral Daily PRN [facility] Holter, DO      propranolol  10 mg Oral Q12H KAYLIE HOLLI Lion      senna-docusate sodium  1 tablet Oral Daily PRN [facility] Cresencioter, DO      senna-docusate sodium  2 tablet Oral BID HOLLI Lion      traZODone  50 mg Oral HS PRN HOLLI Lion      white petrolatum-mineral oil   Topical TID PRN HOLLI Lion         Counseling / Coordination of Care: Total floor / unit time spent today 15 minutes. Greater than 50% of total time was spent with the patient and / or family counseling and / or somewhat receptive to supportive listening and teaching positive coping skills to deal with symptom mangement.     Patient's Rights, confidentiality and exceptions to confidentiality, use of automated medical record, Behavioral Health Services staff access to medical record, and consent to treatment reviewed.    This note has been dictated and hence there may be problems with punctuation, spelling and formatting and if anyone has any concerns please address them to Dr. Rosario   This note is not shared with patient due to potential for making patient's condition worse by knowing the content of the note.

## 2024-08-10 PROCEDURE — 99232 SBSQ HOSP IP/OBS MODERATE 35: CPT | Performed by: PSYCHIATRY & NEUROLOGY

## 2024-08-10 RX ADMIN — CLOZAPINE 200 MG: 200 TABLET ORAL at 17:10

## 2024-08-10 RX ADMIN — AMLODIPINE BESYLATE 5 MG: 5 TABLET ORAL at 09:01

## 2024-08-10 RX ADMIN — PANTOPRAZOLE SODIUM 20 MG: 20 TABLET, DELAYED RELEASE ORAL at 06:10

## 2024-08-10 RX ADMIN — GLYCOPYRROLATE 1 MG: 1 TABLET ORAL at 21:15

## 2024-08-10 RX ADMIN — GLYCOPYRROLATE 1 MG: 1 TABLET ORAL at 17:10

## 2024-08-10 RX ADMIN — FAMOTIDINE 20 MG: 20 TABLET ORAL at 09:02

## 2024-08-10 RX ADMIN — CLOZAPINE 300 MG: 200 TABLET ORAL at 21:14

## 2024-08-10 RX ADMIN — FLUTICASONE PROPIONATE 1 SPRAY: 50 SPRAY, METERED NASAL at 09:02

## 2024-08-10 RX ADMIN — METFORMIN HYDROCHLORIDE 500 MG: 500 TABLET, FILM COATED ORAL at 17:10

## 2024-08-10 RX ADMIN — ESCITALOPRAM OXALATE 20 MG: 10 TABLET, FILM COATED ORAL at 09:02

## 2024-08-10 RX ADMIN — HYDROCHLOROTHIAZIDE 12.5 MG: 12.5 TABLET ORAL at 09:01

## 2024-08-10 RX ADMIN — METFORMIN HYDROCHLORIDE 500 MG: 500 TABLET, FILM COATED ORAL at 09:02

## 2024-08-10 RX ADMIN — FAMOTIDINE 20 MG: 20 TABLET ORAL at 17:10

## 2024-08-10 RX ADMIN — SENNOSIDES AND DOCUSATE SODIUM 2 TABLET: 8.6; 5 TABLET ORAL at 09:02

## 2024-08-10 RX ADMIN — PROPRANOLOL HYDROCHLORIDE 10 MG: 10 TABLET ORAL at 21:15

## 2024-08-10 RX ADMIN — CYANOCOBALAMIN TAB 1000 MCG 1000 MCG: 1000 TAB at 09:02

## 2024-08-10 RX ADMIN — SENNOSIDES AND DOCUSATE SODIUM 2 TABLET: 8.6; 5 TABLET ORAL at 17:10

## 2024-08-10 RX ADMIN — NICOTINE POLACRILEX 2 MG: 2 GUM, CHEWING ORAL at 17:11

## 2024-08-10 RX ADMIN — NICOTINE POLACRILEX 2 MG: 2 GUM, CHEWING ORAL at 01:03

## 2024-08-10 RX ADMIN — NICOTINE POLACRILEX 2 MG: 2 GUM, CHEWING ORAL at 09:03

## 2024-08-10 RX ADMIN — NICOTINE POLACRILEX 2 MG: 2 GUM, CHEWING ORAL at 21:15

## 2024-08-10 RX ADMIN — PROPRANOLOL HYDROCHLORIDE 10 MG: 10 TABLET ORAL at 09:02

## 2024-08-10 RX ADMIN — GLYCOPYRROLATE 1 MG: 1 TABLET ORAL at 09:02

## 2024-08-10 RX ADMIN — LORATADINE 10 MG: 10 TABLET ORAL at 09:03

## 2024-08-10 RX ADMIN — ARIPIPRAZOLE 15 MG: 15 TABLET ORAL at 09:02

## 2024-08-10 NOTE — PLAN OF CARE
Problem: Alteration in Thoughts and Perception  Goal: Verbalize thoughts and feelings  Description: Interventions:  - Promote a nonjudgmental and trusting relationship with the patient through active listening and therapeutic communication  - Assess patient's level of functioning, behavior and potential for risk  - Engage patient in 1 on 1 interactions  - Encourage patient to express fears, feelings, frustrations, and discuss symptoms    - [place] patient to reality, help patient recognize reality-based thinking   - Administer medications as ordered and assess for potential side effects  - Provide the patient education related to the signs and symptoms of the illness and desired effects of prescribed medications  Outcome: Progressing  Goal: Refrain from acting on delusional thinking/internal stimuli  Description: Interventions:  - Monitor patient closely, per order   - Utilize least restrictive measures   - Set reasonable limits, give positive feedback for acceptable   - Administer medications as ordered and monitor of potential side effects  Outcome: Progressing  Goal: Agree to be compliant with medication regime, as prescribed and report medication side effects  Description: Interventions:  - Offer appropriate PRN medication and supervise ingestion; conduct AIMS, as needed   Outcome: Progressing  Goal: Complete daily ADLs, including personal hygiene independently, as able  Description: Interventions:  - Observe, teach, and assist patient with ADLS  - Monitor and promote a balance of rest/activity, with adequate nutrition and elimination   Outcome: Progressing     Problem: Ineffective Coping  Goal: Participates in unit activities  Description: Interventions:  - Provide therapeutic environment   - Provide required programming   - Redirect inappropriate behaviors   Outcome: Progressing     Problem: Depression  Goal: Refrain from harming self  Description: Interventions:  - Monitor patient closely, per order   -  Supervise medication ingestion, monitor effects and side effects   Outcome: Progressing  Goal: Refrain from isolation  Description: Interventions:  - Develop a trusting relationship   - Encourage socialization   Outcome: Progressing  Goal: Refrain from self-neglect  Outcome: Progressing  Goal: Attend and participate in unit activities, including therapeutic, recreational, and educational groups  Description: Interventions:  - Provide therapeutic and educational activities daily, encourage attendance and participation, and document same in the medical record   Outcome: Progressing

## 2024-08-10 NOTE — PROGRESS NOTES
"Progress Note - Behavioral Health     Alberto Berumen 28 y.o. male MRN: [id number]  Unit/Bed#: [facility] 108-02 Encounter: [id number]      Alberto Berumen was seen for continuing care and reviewed with treatment team.  Pt was calm and cooperative for interview, and reported \"some days are better than others\" with his depression.  He reports the same AH of voices threatening to kill him and his family, which makes him fearful/ \"Paranoid\" about going home.  Pt denied any present SI, HI, anxiety, VH, OH, TH.  He intends to continue maintenance ECT.  Pt reports sleeping and eating well to which nursing concurred.       Per EMR and floor team, the Pt remains calm, cooperative, and medication compliant.,  Though he did refuse a weight measurement.  He has been visible and social particularly with a female peer in the milieu, but observing appropriate boundaries.  He continues attending most therapeutic groups.    Behavior in the last 24 hours:   Sleep:Good  Appetite: Good   Medication side effects: None per Pt    ROS: No complaints per Pt, (All ROS Negative)    Labs/Tests: Reviewed    Mental Status Evaluation:  Appearance:  Dressed, clean, partial eye contact   Behavior:  Calm, cooperative, pleasant, Withdrawn   Speech:  Clear, normal rate and volume, not very spontaneous   Mood:  Anxious, Depressed   Affect:  Blunted   Thought Process:  Organized, Goal directed   Associations: Intact associations   Thought Content:  Some paranoid ideation that is likely more anxiety than true paranoia   Perceptual Disturbances: +AH, but no CAH, he has self-described paranoia, but it seems to be more so anxiety related fear.  He does not appear to be RIS   Risk Potential: Pt presently denies SI or HI    Sensorium:  Self, birthday, Place, Day of the week, Month, Year   Memory:  short term memory grossly intact   Consciousness:  alert, awake   Attention: Good   Insight:  Fair   Judgment: Good   Gait/Station: Normal gait/station   Motor " Activity: No abnormal movements     Vitals:    08/10/24 1540 08/10/24 2033 08/11/24 0857 08/11/24 1523   BP: 134/79 117/87 145/81 154/83   BP Location: Right arm Right arm Right arm Left arm   Pulse: 100 104 98 100   Resp: 18 18 18 18   Temp: 97.7 °F (36.5 °C) 97.8 °F (36.6 °C) (!) 97.4 °F (36.3 °C) 97.6 °F (36.4 °C)   TempSrc: Temporal Temporal Skin Temporal   SpO2: 99% 99%  97%   Weight:       Height:           Progress Toward Goals:  Based on today's interview and review of prior notes, no significant change through the weekend.  Continue the present medication regimen unchanged   Continue maintenance ECT  CBC with differential, CRP, BNP, and CK weekly on Thursdays for monitoring while on Clozapine  Pt remains on dual antipsychotic Tx due to failure on monotherapy        Assessment & Plan   Principal Problem:    Schizoaffective disorder, bipolar type (HCC)  Active Problems:    GERD (gastroesophageal reflux disease)    Medical clearance for psychiatric admission    Tobacco abuse    T wave inversion in EKG    Chronic idiopathic constipation    Confluent and reticulate papillomatosis    Primary hypertension    Elevated hemoglobin A1c    Bilateral lower extremity edema      Recommended Treatment: Continue with pharmacotherapy, group therapy, milieu therapy and occupational therapy.  The patient will be maintained on the following medications:  Current Facility-Administered Medications   Medication Dose Route Frequency Provider Last Rate    acetaminophen  650 mg Oral Q4H PRN Jordan C Holter, DO      acetaminophen  650 mg Oral Q6H PRN HOLLI Lion      aluminum-magnesium hydroxide-simethicone  30 mL Oral Q4H PRN Jordan C Holter, DO      amLODIPine  5 mg Oral Daily HOLLI Lion      ARIPiprazole  15 mg Oral Daily Bora Rosario MD      Artificial Tears  1 drop Both Eyes Q3H PRN Jordan C Holter, DO      atropine  1 drop Sublingual Daily PRN HOLLI Lion      haloperidol lactate  2.5 mg Intramuscular Q4H  PRN Max 4/day Eveline Hunt CRNP      And    LORazepam  1 mg Intramuscular Q4H PRN Max 4/day Eveline Hunt, CRNP      And    benztropine  0.5 mg Intramuscular Q4H PRN Max 4/day Evelinevipul Hunt, CRNP      benztropine  1 mg Intramuscular Q4H PRN Max 6/day Jordan C Holter, DO      haloperidol lactate  5 mg Intramuscular Q4H PRN Max 4/day STEVE LionNP      And    LORazepam  2 mg Intramuscular Q4H PRN Max 4/day Eveline Hunt, CRNP      And    benztropine  1 mg Intramuscular Q4H PRN Max 4/day Evelinevipul Hunt, CRNP      benztropine  1 mg Oral Q4H PRN Max 6/day Eveline Hunt, CRJENNIE      benztropine  1 mg Oral Q4H PRN Max 6/day Jordan C Holter, DO      bisacodyl  10 mg Rectal Daily PRN Eveline Hunt, HOLLI      calcium carbonate  500 mg Oral BID PRN HOLLI Monge      cloZAPine  200 mg Oral Before Dinner Bora Rosario MD      cloZAPine  300 mg Oral HS Bora Rosario MD      cyanocobalamin  1,000 mcg Oral Daily HOLLI Galvan      hydrOXYzine HCL  50 mg Oral Q6H PRN Max 4/day Jordan C Holter, DO      Or    diphenhydrAMINE  50 mg Intramuscular Q6H PRN Jordan C Holter, DO      hydrOXYzine HCL  50 mg Oral Q6H PRN Max 4/day HOLLI Lion      Or    diphenhydrAMINE  50 mg Intramuscular Q6H PRN HOLLI Lion      diphenhydrAMINE-zinc acetate   Topical BID PRN HOLLI Lion      escitalopram  20 mg Oral Daily HOLLI Lion      famotidine  20 mg Oral BID HOLLI Monge      fluticasone  1 spray Each Nare Daily Brina Guillen MD      glycopyrrolate  1 mg Oral TID Bora Rosario MD      haloperidol  1 mg Oral Q6H PRN HOLLI Lion      haloperidol  2.5 mg Oral Q4H PRN Max 4/day HOLLI Lion      haloperidol  5 mg Oral Q4H PRN Max 4/day HOLLI Lion      hydroCHLOROthiazide  12.5 mg Oral Daily HOLLI Galvan      hydrocortisone   Topical 4x Daily PRN HOLLI Lion      hydrOXYzine HCL  100 mg Oral Q6H PRN Max 4/day Jordan C Holter, DO      Or     LORazepam  2 mg Intramuscular Q6H PRN Ion FISCHER Holter, DO      hydrOXYzine HCL  100 mg Oral Q6H PRN Max 4/day HOLLI Lion      Or    LORazepam  2 mg Intramuscular Q6H PRN HOLLI Lion      hydrOXYzine HCL  25 mg Oral Q6H PRN Max 4/day Ion FISCHER Holter, DO      ibuprofen  600 mg Oral Q8H PRN HOLLI Lion      lactated ringers  50 mL/hr Intravenous Continuous Antwan Dong MD      Lidocaine Viscous HCl  15 mL Swish & Spit 4x Daily PRN HOLLI Galvan      loratadine  10 mg Oral Daily Brina Guillen MD      melatonin  3 mg Oral HS PRN Bora Rosario MD      metFORMIN  500 mg Oral BID With Meals HOLLI Galvan      methocarbamol  500 mg Oral Q6H PRN HOLLI Lion      nicotine polacrilex  2 mg Oral Q4H PRN Bora Rosario MD      OLANZapine  5 mg Oral Q4H PRN Max 3/day Ion FISCHER Holter, DO      Or    OLANZapine  2.5 mg Intramuscular Q4H PRN Max 3/day [facility] Holter, DO      OLANZapine  5 mg Oral Q3H PRN Max 3/day Ion  Holter, DO      Or    OLANZapine  5 mg Intramuscular Q3H PRN Max 3/day Ion  Holter, DO      OLANZapine  2.5 mg Oral Q4H PRN Max 6/day [facility] Holter, DO      ondansetron  4 mg Oral Q6H PRN HOLLI Lion      pantoprazole  20 mg Oral Early Morning HOLLI Monge      polyethylene glycol  17 g Oral Daily PRN Ion FISCHER Holter, DO      propranolol  10 mg Oral Q12H KAYLIE HOLLI Lion      senna-docusate sodium  1 tablet Oral Daily PRN Jordan C Holter, DO      senna-docusate sodium  2 tablet Oral BID HOLLI Lion      traZODone  50 mg Oral HS PRN HOLLI Lion      white petrolatum-mineral oil   Topical TID PRN HOLLI Lion         Risks, benefits and possible side effects of Medications:   Risks, benefits, and possible side effects of medications explained to patient and patient verbalizes understanding

## 2024-08-10 NOTE — NURSING NOTE
Patient is blunted. Visible and social w/ select peers. Ate 100% of breakfast and lunch. Medication compliant. Asking for in-person and virtual visits. Reminded this needs to be approved by team. Attending some groups. Continuous rounding maintained.

## 2024-08-10 NOTE — NURSING NOTE
Remains blunted. Visible and social w/ female peer. Med and meal comp. Reports feeling fearful r/t D/C. AH and paranoia are not improving. Anxious about how he will handle these symptoms at home. No behavioral issues.

## 2024-08-10 NOTE — NURSING NOTE
Alberto reported feeling depressed today, but denied feeling anxious.  Reported auditory hallucinations. The voices are still telling him that they are going to kill him and his family.    Pt is visible, social, pleasant and cooperative.  Meal and med compliant, consumed 100% of lunch and dinner.  0% for breakfast. Requested gum throughout the shift.  No unmet needs.  No behavioral issues noted or reported.

## 2024-08-11 VITALS
BODY MASS INDEX: 41.53 KG/M2 | HEIGHT: 66 IN | HEART RATE: 106 BPM | OXYGEN SATURATION: 97 % | RESPIRATION RATE: 18 BRPM | SYSTOLIC BLOOD PRESSURE: 125 MMHG | DIASTOLIC BLOOD PRESSURE: 69 MMHG | TEMPERATURE: 97.6 F | WEIGHT: 258.4 LBS

## 2024-08-11 PROCEDURE — 99232 SBSQ HOSP IP/OBS MODERATE 35: CPT | Performed by: PSYCHIATRY & NEUROLOGY

## 2024-08-11 RX ADMIN — CLOZAPINE 200 MG: 200 TABLET ORAL at 17:12

## 2024-08-11 RX ADMIN — SENNOSIDES AND DOCUSATE SODIUM 2 TABLET: 8.6; 5 TABLET ORAL at 08:59

## 2024-08-11 RX ADMIN — ESCITALOPRAM OXALATE 20 MG: 10 TABLET, FILM COATED ORAL at 09:00

## 2024-08-11 RX ADMIN — FLUTICASONE PROPIONATE 1 SPRAY: 50 SPRAY, METERED NASAL at 08:59

## 2024-08-11 RX ADMIN — CYANOCOBALAMIN TAB 1000 MCG 1000 MCG: 1000 TAB at 09:00

## 2024-08-11 RX ADMIN — CLOZAPINE 300 MG: 200 TABLET ORAL at 21:19

## 2024-08-11 RX ADMIN — SENNOSIDES AND DOCUSATE SODIUM 2 TABLET: 8.6; 5 TABLET ORAL at 17:12

## 2024-08-11 RX ADMIN — NICOTINE POLACRILEX 2 MG: 2 GUM, CHEWING ORAL at 08:59

## 2024-08-11 RX ADMIN — FAMOTIDINE 20 MG: 20 TABLET ORAL at 09:00

## 2024-08-11 RX ADMIN — NICOTINE POLACRILEX 2 MG: 2 GUM, CHEWING ORAL at 13:12

## 2024-08-11 RX ADMIN — NICOTINE POLACRILEX 2 MG: 2 GUM, CHEWING ORAL at 21:19

## 2024-08-11 RX ADMIN — GLYCOPYRROLATE 1 MG: 1 TABLET ORAL at 21:19

## 2024-08-11 RX ADMIN — METFORMIN HYDROCHLORIDE 500 MG: 500 TABLET, FILM COATED ORAL at 09:03

## 2024-08-11 RX ADMIN — METFORMIN HYDROCHLORIDE 500 MG: 500 TABLET, FILM COATED ORAL at 17:12

## 2024-08-11 RX ADMIN — FAMOTIDINE 20 MG: 20 TABLET ORAL at 17:12

## 2024-08-11 RX ADMIN — PROPRANOLOL HYDROCHLORIDE 10 MG: 10 TABLET ORAL at 21:19

## 2024-08-11 RX ADMIN — AMLODIPINE BESYLATE 5 MG: 5 TABLET ORAL at 08:59

## 2024-08-11 RX ADMIN — ARIPIPRAZOLE 15 MG: 15 TABLET ORAL at 08:59

## 2024-08-11 RX ADMIN — PROPRANOLOL HYDROCHLORIDE 10 MG: 10 TABLET ORAL at 08:59

## 2024-08-11 RX ADMIN — LORATADINE 10 MG: 10 TABLET ORAL at 08:59

## 2024-08-11 RX ADMIN — GLYCOPYRROLATE 1 MG: 1 TABLET ORAL at 09:00

## 2024-08-11 RX ADMIN — NICOTINE POLACRILEX 2 MG: 2 GUM, CHEWING ORAL at 17:12

## 2024-08-11 RX ADMIN — GLYCOPYRROLATE 1 MG: 1 TABLET ORAL at 17:12

## 2024-08-11 RX ADMIN — PANTOPRAZOLE SODIUM 20 MG: 20 TABLET, DELAYED RELEASE ORAL at 06:25

## 2024-08-11 RX ADMIN — HYDROCHLOROTHIAZIDE 12.5 MG: 12.5 TABLET ORAL at 09:00

## 2024-08-11 NOTE — NURSING NOTE
Alberto maintained on ongoing SAFE precaution without incident on this shift.  Awake, alert , visible, pleasant, withdrawn and cooperative upon approach.   Attended and participated in 6 out of 8 groups today.  Continues to be compliant with medication regimen and had snack.  Spend most of his time taking with a female peer.  Both parties are maintaining proper distance, no inappropriate observed.  Denies any depression or anxiety.  No delusion  or A/T/V hallucination noted.  Behavioral control

## 2024-08-11 NOTE — NURSING NOTE
Patient is blunted, pleasant, and cooperative. Difficult to wake in AM. Visible and social w/ female peer. Med and meal compliant. Attended coping skills. Does not appear internally preoccupied. Continuous rounding maintained.

## 2024-08-11 NOTE — NURSING NOTE
Refused weekly weight. Lungs CTA. Bowel sounds normoactive. Last BM 8/10. No edema observed. B/l feet dry. Remedy repair cream encouraged. Toenails are long. Patient could use a podiatry consult.

## 2024-08-11 NOTE — NURSING NOTE
Remains blunted. Sitting in lounge w/ female peer. Med and meal comp. No inappropriate behaviors.

## 2024-08-12 PROCEDURE — 99232 SBSQ HOSP IP/OBS MODERATE 35: CPT | Performed by: PSYCHIATRY & NEUROLOGY

## 2024-08-12 RX ADMIN — METFORMIN HYDROCHLORIDE 500 MG: 500 TABLET, FILM COATED ORAL at 08:55

## 2024-08-12 RX ADMIN — PANTOPRAZOLE SODIUM 20 MG: 20 TABLET, DELAYED RELEASE ORAL at 06:45

## 2024-08-12 RX ADMIN — NICOTINE POLACRILEX 2 MG: 2 GUM, CHEWING ORAL at 21:05

## 2024-08-12 RX ADMIN — ESCITALOPRAM OXALATE 20 MG: 10 TABLET, FILM COATED ORAL at 08:55

## 2024-08-12 RX ADMIN — GLYCOPYRROLATE 1 MG: 1 TABLET ORAL at 21:04

## 2024-08-12 RX ADMIN — GLYCOPYRROLATE 1 MG: 1 TABLET ORAL at 17:02

## 2024-08-12 RX ADMIN — NICOTINE POLACRILEX 2 MG: 2 GUM, CHEWING ORAL at 17:04

## 2024-08-12 RX ADMIN — NICOTINE POLACRILEX 2 MG: 2 GUM, CHEWING ORAL at 09:00

## 2024-08-12 RX ADMIN — FAMOTIDINE 20 MG: 20 TABLET ORAL at 17:02

## 2024-08-12 RX ADMIN — PROPRANOLOL HYDROCHLORIDE 10 MG: 10 TABLET ORAL at 08:53

## 2024-08-12 RX ADMIN — SENNOSIDES AND DOCUSATE SODIUM 2 TABLET: 8.6; 5 TABLET ORAL at 17:02

## 2024-08-12 RX ADMIN — CLOZAPINE 300 MG: 200 TABLET ORAL at 21:04

## 2024-08-12 RX ADMIN — PROPRANOLOL HYDROCHLORIDE 10 MG: 10 TABLET ORAL at 21:05

## 2024-08-12 RX ADMIN — CLOZAPINE 200 MG: 200 TABLET ORAL at 17:02

## 2024-08-12 RX ADMIN — SENNOSIDES AND DOCUSATE SODIUM 2 TABLET: 8.6; 5 TABLET ORAL at 08:55

## 2024-08-12 RX ADMIN — AMLODIPINE BESYLATE 5 MG: 5 TABLET ORAL at 08:55

## 2024-08-12 RX ADMIN — GLYCOPYRROLATE 1 MG: 1 TABLET ORAL at 09:01

## 2024-08-12 RX ADMIN — LORATADINE 10 MG: 10 TABLET ORAL at 08:55

## 2024-08-12 RX ADMIN — METFORMIN HYDROCHLORIDE 500 MG: 500 TABLET, FILM COATED ORAL at 17:02

## 2024-08-12 RX ADMIN — CYANOCOBALAMIN TAB 1000 MCG 1000 MCG: 1000 TAB at 08:55

## 2024-08-12 RX ADMIN — ARIPIPRAZOLE 15 MG: 15 TABLET ORAL at 08:55

## 2024-08-12 RX ADMIN — FAMOTIDINE 20 MG: 20 TABLET ORAL at 08:55

## 2024-08-12 RX ADMIN — NICOTINE POLACRILEX 2 MG: 2 GUM, CHEWING ORAL at 13:03

## 2024-08-12 RX ADMIN — HYDROCHLOROTHIAZIDE 12.5 MG: 12.5 TABLET ORAL at 08:55

## 2024-08-12 NOTE — NURSING NOTE
Alberto maintained on ongoing SAFE precaution without incident on this shift.  Awake, alert , visible, pleasant, and cooperative upon approach.   Attended and participated in 2 out of 5 groups today.  Continues to be compliant with medication regimen and had snack.  Spend most of his time taking with a female peer.  Both parties are maintaining proper distance, no inappropriate observed.  Denies any depression or anxiety.  No delusion  or A/T/V hallucination noted.  Behavioral control

## 2024-08-12 NOTE — PROGRESS NOTES
Psychiatric Progress Note - Department of Behavioral Health   Alberto Berumen 28 y.o. male MRN: [id number]  Unit/Bed#: [facility] 108-02 Encounter: [id number]    ASSESSMENT & PLAN     Diagnoses:   Principal Problem:    Schizoaffective disorder, bipolar type (HCC)  Active Problems:    GERD (gastroesophageal reflux disease)    Medical clearance for psychiatric admission    Tobacco abuse    T wave inversion in EKG    Chronic idiopathic constipation    Confluent and reticulate papillomatosis    Primary hypertension    Elevated hemoglobin A1c    Bilateral lower extremity edema      Treatment Recommendations/Precautions:  Continue to promote patient participation in therapeutic milieu.  Continue medical management per medicine.  Continue previously prescribed psychotropic medication regimen including use of maintenance electroconvulsive therapy; see below.  Continue behavioral health checks q.7 minutes.   Continue vitals per behavioral health unit protocol.  Discharge date per primary team    SUBJECTIVE     Patient evaluated this a.m. for continuity of care. Patient was discussed at length with nursing and treatment team. Per nursing, patient remains calm, cooperative, intermittently interactive in the milieu, adherent to his medications without any acute adverse effects. No acute distress is noted throughout evaluation. Alberto Berumen denies suicidal/homicidal ideation in addition to thoughts of self-injury, receptive to crisis planning provided by this writer, contacting for safety in the inpatient setting, admitting to an ability to appropriately confide in staff including this writer.  Patient appears scant and sparse in interaction involving this writer, admitting to intermittent instances of auditory hallucinations that are negative and derogatory in content, denying any additional psychiatric complaints/concerns    PSYCHIATRIC REVIEW OF SYSTEMS     Behavior over the last 24 hours:  unchanged  Sleep:  adequate  Appetite: adequate  Medication side effects: No    REVIEW OF SYSTEMS   Review of systems: no complaints    OBJECTIVE     Vital Signs in Past 24 Hours:  Temp:  [97.6 °F (36.4 °C)-97.8 °F (36.6 °C)] 97.8 °F (36.6 °C)  HR:  [] 91  Resp:  [18] 18  BP: (124-154)/(68-83) 124/68    Intake/Output in Past 24 hours:  No intake/output data recorded.  No intake/output data recorded.        Laboratory Results:  I have personally reviewed all pertinent laboratory/tests results.  Most Recent Labs:   Lab Results   Component Value Date    WBC 9.85 08/08/2024    RBC 5.54 08/08/2024    HGB 13.0 08/08/2024    HCT 44.6 08/08/2024     08/08/2024    RDW 13.9 08/08/2024    NEUTROABS 5.85 08/08/2024    SODIUM 136 06/03/2024    K 3.8 06/03/2024    CL 96 06/03/2024    CO2 29 06/03/2024    BUN 10 06/03/2024    CREATININE 1.00 06/03/2024    GLUC 97 06/03/2024    GLUF 103 (H) 05/29/2024    CALCIUM 10.1 06/03/2024    AST 33 06/03/2024    ALT 72 (H) 06/03/2024    ALKPHOS 80 06/03/2024    TP 8.5 (H) 06/03/2024    ALB 4.7 06/03/2024    TBILI 0.30 06/03/2024    CHOLESTEROL 156 03/14/2024    HDL 44 03/14/2024    TRIG 133 03/14/2024    LDLCALC 85 03/14/2024    NONHDLC 112 03/14/2024    LITHIUM 0.61 01/09/2024    [id number] 1.062 11/15/2023    HGBA1C 5.0 04/01/2024    EAG 97 04/01/2024       Behavioral Health Medications: all current active meds have been reviewed and current meds:   Current Facility-Administered Medications   Medication Dose Route Frequency    acetaminophen (TYLENOL) tablet 650 mg  650 mg Oral Q4H PRN    acetaminophen (TYLENOL) tablet 650 mg  650 mg Oral Q6H PRN    aluminum-magnesium hydroxide-simethicone (MAALOX) oral suspension 30 mL  30 mL Oral Q4H PRN    amLODIPine (NORVASC) tablet 5 mg  5 mg Oral Daily    ARIPiprazole (ABILIFY) tablet 15 mg  15 mg Oral Daily    Artificial Tears ophthalmic solution 1 drop  1 drop Both Eyes Q3H PRN    atropine (ISOPTO ATROPINE) 1 % ophthalmic solution 1 drop  1 drop  Sublingual Daily PRN    haloperidol lactate (HALDOL) injection 2.5 mg  2.5 mg Intramuscular Q4H PRN Max 4/day    And    LORazepam (ATIVAN) injection 1 mg  1 mg Intramuscular Q4H PRN Max 4/day    And    benztropine (COGENTIN) injection 0.5 mg  0.5 mg Intramuscular Q4H PRN Max 4/day    benztropine (COGENTIN) injection 1 mg  1 mg Intramuscular Q4H PRN Max 6/day    haloperidol lactate (HALDOL) injection 5 mg  5 mg Intramuscular Q4H PRN Max 4/day    And    LORazepam (ATIVAN) injection 2 mg  2 mg Intramuscular Q4H PRN Max 4/day    And    benztropine (COGENTIN) injection 1 mg  1 mg Intramuscular Q4H PRN Max 4/day    benztropine (COGENTIN) tablet 1 mg  1 mg Oral Q4H PRN Max 6/day    benztropine (COGENTIN) tablet 1 mg  1 mg Oral Q4H PRN Max 6/day    bisacodyl (DULCOLAX) rectal suppository 10 mg  10 mg Rectal Daily PRN    calcium carbonate (TUMS) chewable tablet 500 mg  500 mg Oral BID PRN    clozapine (CLOZARIL) tablet 200 mg  200 mg Oral Before Dinner    cloZAPine (CLOZARIL) tablet 300 mg  300 mg Oral HS    cyanocobalamin (VITAMIN B-12) tablet 1,000 mcg  1,000 mcg Oral Daily    hydrOXYzine HCL (ATARAX) tablet 50 mg  50 mg Oral Q6H PRN Max 4/day    Or    diphenhydrAMINE (BENADRYL) injection 50 mg  50 mg Intramuscular Q6H PRN    hydrOXYzine HCL (ATARAX) tablet 50 mg  50 mg Oral Q6H PRN Max 4/day    Or    diphenhydrAMINE (BENADRYL) injection 50 mg  50 mg Intramuscular Q6H PRN    diphenhydrAMINE-zinc acetate (BENADRYL) 2-0.1 % cream   Topical BID PRN    escitalopram (LEXAPRO) tablet 20 mg  20 mg Oral Daily    famotidine (PEPCID) tablet 20 mg  20 mg Oral BID    fluticasone (FLONASE) 50 mcg/act nasal spray 1 spray  1 spray Each Nare Daily    glycopyrrolate (ROBINUL) tablet 1 mg  1 mg Oral TID    haloperidol (HALDOL) tablet 1 mg  1 mg Oral Q6H PRN    haloperidol (HALDOL) tablet 2.5 mg  2.5 mg Oral Q4H PRN Max 4/day    haloperidol (HALDOL) tablet 5 mg  5 mg Oral Q4H PRN Max 4/day    hydroCHLOROthiazide tablet 12.5 mg  12.5 mg Oral  Daily    hydrocortisone 1 % ointment   Topical 4x Daily PRN    hydrOXYzine HCL (ATARAX) tablet 100 mg  100 mg Oral Q6H PRN Max 4/day    Or    LORazepam (ATIVAN) injection 2 mg  2 mg Intramuscular Q6H PRN    hydrOXYzine HCL (ATARAX) tablet 100 mg  100 mg Oral Q6H PRN Max 4/day    Or    LORazepam (ATIVAN) injection 2 mg  2 mg Intramuscular Q6H PRN    hydrOXYzine HCL (ATARAX) tablet 25 mg  25 mg Oral Q6H PRN Max 4/day    ibuprofen (MOTRIN) tablet 600 mg  600 mg Oral Q8H PRN    lactated ringers infusion  50 mL/hr Intravenous Continuous    Lidocaine Viscous HCl (XYLOCAINE) 2 % mucosal solution 15 mL  15 mL Swish & Spit 4x Daily PRN    loratadine (CLARITIN) tablet 10 mg  10 mg Oral Daily    melatonin tablet 3 mg  3 mg Oral HS PRN    metFORMIN (GLUCOPHAGE) tablet 500 mg  500 mg Oral BID With Meals    methocarbamol (ROBAXIN) tablet 500 mg  500 mg Oral Q6H PRN    nicotine polacrilex (NICORETTE) gum 2 mg  2 mg Oral Q4H PRN    OLANZapine (ZyPREXA) tablet 5 mg  5 mg Oral Q4H PRN Max 3/day    Or    OLANZapine (ZyPREXA) IM injection 2.5 mg  2.5 mg Intramuscular Q4H PRN Max 3/day    OLANZapine (ZyPREXA) tablet 5 mg  5 mg Oral Q3H PRN Max 3/day    Or    OLANZapine (ZyPREXA) IM injection 5 mg  5 mg Intramuscular Q3H PRN Max 3/day    OLANZapine (ZyPREXA) tablet 2.5 mg  2.5 mg Oral Q4H PRN Max 6/day    ondansetron (ZOFRAN-ODT) dispersible tablet 4 mg  4 mg Oral Q6H PRN    pantoprazole (PROTONIX) EC tablet 20 mg  20 mg Oral Early Morning    polyethylene glycol (MIRALAX) packet 17 g  17 g Oral Daily PRN    propranolol (INDERAL) tablet 10 mg  10 mg Oral Q12H KAYLIE    senna-docusate sodium (SENOKOT S) 8.6-50 mg per tablet 1 tablet  1 tablet Oral Daily PRN    senna-docusate sodium (SENOKOT S) 8.6-50 mg per tablet 2 tablet  2 tablet Oral BID    traZODone (DESYREL) tablet 50 mg  50 mg Oral HS PRN    white petrolatum-mineral oil (EUCERIN,HYDROCERIN) cream   Topical TID PRN   .    Risks, benefits and possible side effects of Medications:  "  Risks, benefits, and possible side effects of medications explained to patient and patient verbalizes understanding.      Mental Status Evaluation:  Appearance:  age appropriate, casually dressed, and disheveled   Behavior:  psychomotor retardation, calm and cooperative possessing limited eye contact   Speech:  soft and scant   Mood:  euthymic; \"OK\"   Affect:  blunted and mood-incongruent   Language sparse   Thought Process:  goal directed   Thought Content:  Negative thinking   Perceptual Disturbances: Auditory hallucinations without commands appearing internally preoccupied   Risk Potential: Suicidal Ideations none, Homicidal Ideations none, and Potential for Aggression No   Sensorium:  person, place, and time/date   Cognition:  recent and remote memory grossly intact   Consciousness:  alert and awake    Attention: attention span appeared shorter than expected for age   Insight:  limited   Judgment: limited   Intellect Not assessed   Gait/Station: normal gait/station and normal balance   Motor Activity: no abnormal movements     Memory: Short and long term memory fair     Progress Toward Goals: Unchanged, as evidenced by their participation (or lack thereof) in individual, social and therapeutic milieu in addition to adherence to their medication regimen.    Recommended Treatment:   See above for assessment and plan.    Inpatient Psychiatric Certification: Based upon physical, mental and social evaluations, I certify that inpatient psychiatric services are medically necessary for this patient for a duration of greater than 2 midnights for the treatment of Schizoaffective disorder, bipolar type (HCC) including psychotropic medication management, participation in the therapeutic milieu and referrals as indicated. Available alternative community resources do not meet the patient's mental health care needs. I further attest that an established written individualized plan of care has been implemented and is outlined " in the patient's medical records.    Counseling/Coordination of Care    Total unit time spent today was greater than 15 minutes. Greater than 50% of total time was spent with the patient and/or patient's relatives and/or coordination of patient's care.     Patient's rights, confidentiality, exceptions to confidentiality, use of electronic medical record including appropriate staff access to medical record regarding behavioral health services and consent to treatment were reviewed.    Note Share:     This note was not shared with the patient due to reasonable likelihood of causing patient harm     This note has been constructed using a voice recognition system.    There may be translation, syntax, or grammatical errors. If you have any questions, please contact the dictating provider.    Jordan Christopher Holter,  08/12/24

## 2024-08-12 NOTE — NURSING NOTE
Patient remains blunted, pleasant, and cooperative. Visible sitting in lounge w/ female peer. Med and meal comp. Does not appear overtly anxious or preoccupied. No behavioral issues.

## 2024-08-12 NOTE — SOCIAL WORK
SW and pt met 1:1  SW inquired about pt being more withdrawn, quiet, keeping to himself in his room. Pt reports that he's been tired. SW acknowledged major changes with peer group lately; pt agreed it's likely having an impact.   Pt desires a visit with his family soon; SW encouraged pt to communicate with them and will assist with attempting to coordinate visit.   Pt denied any other concerns or immediate needs.

## 2024-08-12 NOTE — PLAN OF CARE
Problem: Alteration in Thoughts and Perception  Goal: Treatment Goal: Gain control of psychotic behaviors/thinking, reduce/eliminate presenting symptoms and demonstrate improved reality functioning upon discharge  Outcome: Progressing  Goal: Verbalize thoughts and feelings  Description: Interventions:  - Promote a nonjudgmental and trusting relationship with the patient through active listening and therapeutic communication  - Assess patient's level of functioning, behavior and potential for risk  - Engage patient in 1 on 1 interactions  - Encourage patient to express fears, feelings, frustrations, and discuss symptoms    - [place] patient to reality, help patient recognize reality-based thinking   - Administer medications as ordered and assess for potential side effects  - Provide the patient education related to the signs and symptoms of the illness and desired effects of prescribed medications  Outcome: Progressing  Goal: Refrain from acting on delusional thinking/internal stimuli  Description: Interventions:  - Monitor patient closely, per order   - Utilize least restrictive measures   - Set reasonable limits, give positive feedback for acceptable   - Administer medications as ordered and monitor of potential side effects  Outcome: Progressing  Goal: Agree to be compliant with medication regime, as prescribed and report medication side effects  Description: Interventions:  - Offer appropriate PRN medication and supervise ingestion; conduct AIMS, as needed   Outcome: Progressing  Goal: Attend and participate in unit activities, including therapeutic, recreational, and educational groups  Description: Interventions:  -Encourage Visitation and family involvement in care  Outcome: Progressing  Goal: Recognize dysfunctional thoughts, communicate reality-based thoughts at the time of discharge  Description: Interventions:  - Provide medication and psycho-education to assist patient in compliance and developing  insight into his/her illness   Outcome: Progressing     Problem: Ineffective Coping  Goal: Participates in unit activities  Description: Interventions:  - Provide therapeutic environment   - Provide required programming   - Redirect inappropriate behaviors   Outcome: Progressing     Problem: Depression  Goal: Verbalize thoughts and feelings  Description: Interventions:  - Assess and re-assess patient's level of risk   - Engage patient in 1:1 interactions, daily, for a minimum of 15 minutes   - Encourage patient to express feelings, fears, frustrations, hopes   Outcome: Progressing  Goal: Refrain from harming self  Description: Interventions:  - Monitor patient closely, per order   - Supervise medication ingestion, monitor effects and side effects   Outcome: Progressing  Goal: Refrain from isolation  Description: Interventions:  - Develop a trusting relationship   - Encourage socialization   Outcome: Progressing  Goal: Refrain from self-neglect  Outcome: Progressing  Goal: Attend and participate in unit activities, including therapeutic, recreational, and educational groups  Description: Interventions:  - Provide therapeutic and educational activities daily, encourage attendance and participation, and document same in the medical record   Outcome: Progressing

## 2024-08-12 NOTE — PLAN OF CARE
Problem: Ineffective Coping  Goal: Identifies ineffective coping skills  Outcome: Progressing  Goal: Identifies healthy coping skills  Outcome: Progressing  Goal: Demonstrates healthy coping skills  Outcome: Not Progressing  Goal: Participates in unit activities  Description: Interventions:  - Provide therapeutic environment   - Provide required programming   - Redirect inappropriate behaviors   Outcome: Not Progressing    Attended 34 of the groups offered last week.

## 2024-08-12 NOTE — NURSING NOTE
Patient is blunted. Withdrawn to bedroom. Med and meal comp. Disheveled appearance. Attended mindfulness group. Continuous rounding maintained.

## 2024-08-12 NOTE — PROGRESS NOTES
08/12/24 0749   Team Meeting   Meeting Type Daily Rounds   Team Members Present   Team Members Present Physician;Nurse;;Other (Discipline and Name)   Patient/Family Present   Patient Present No   Patient's Family Present No     In attendance:  Dr. Jordan Holter, DO Ashley O'Hara, MIGUEL Alvarado, [facility]  Irais Mcdowell M.S.    Groups: 5/9    Pt needs podiatry consult; blunted. Pt visible and social with one female peer. ECT this Friday. Pt anxious re: discharge. Pt reporting feeling unstable.

## 2024-08-13 PROCEDURE — 0HBRXZZ EXCISION OF TOE NAIL, EXTERNAL APPROACH: ICD-10-PCS | Performed by: PODIATRIST

## 2024-08-13 PROCEDURE — 99232 SBSQ HOSP IP/OBS MODERATE 35: CPT | Performed by: PSYCHIATRY & NEUROLOGY

## 2024-08-13 RX ADMIN — PROPRANOLOL HYDROCHLORIDE 10 MG: 10 TABLET ORAL at 09:09

## 2024-08-13 RX ADMIN — SENNOSIDES AND DOCUSATE SODIUM 2 TABLET: 8.6; 5 TABLET ORAL at 17:06

## 2024-08-13 RX ADMIN — FLUTICASONE PROPIONATE 1 SPRAY: 50 SPRAY, METERED NASAL at 09:08

## 2024-08-13 RX ADMIN — CLOZAPINE 300 MG: 200 TABLET ORAL at 21:13

## 2024-08-13 RX ADMIN — GLYCOPYRROLATE 1 MG: 1 TABLET ORAL at 09:10

## 2024-08-13 RX ADMIN — SENNOSIDES AND DOCUSATE SODIUM 2 TABLET: 8.6; 5 TABLET ORAL at 09:10

## 2024-08-13 RX ADMIN — METFORMIN HYDROCHLORIDE 500 MG: 500 TABLET, FILM COATED ORAL at 17:05

## 2024-08-13 RX ADMIN — AMLODIPINE BESYLATE 5 MG: 5 TABLET ORAL at 09:09

## 2024-08-13 RX ADMIN — FAMOTIDINE 20 MG: 20 TABLET ORAL at 17:06

## 2024-08-13 RX ADMIN — ARIPIPRAZOLE 15 MG: 15 TABLET ORAL at 09:10

## 2024-08-13 RX ADMIN — GLYCOPYRROLATE 1 MG: 1 TABLET ORAL at 21:13

## 2024-08-13 RX ADMIN — PROPRANOLOL HYDROCHLORIDE 10 MG: 10 TABLET ORAL at 21:13

## 2024-08-13 RX ADMIN — GLYCOPYRROLATE 1 MG: 1 TABLET ORAL at 17:06

## 2024-08-13 RX ADMIN — NICOTINE POLACRILEX 2 MG: 2 GUM, CHEWING ORAL at 09:10

## 2024-08-13 RX ADMIN — HYDROCHLOROTHIAZIDE 12.5 MG: 12.5 TABLET ORAL at 09:09

## 2024-08-13 RX ADMIN — METFORMIN HYDROCHLORIDE 500 MG: 500 TABLET, FILM COATED ORAL at 09:10

## 2024-08-13 RX ADMIN — NICOTINE POLACRILEX 2 MG: 2 GUM, CHEWING ORAL at 21:45

## 2024-08-13 RX ADMIN — PANTOPRAZOLE SODIUM 20 MG: 20 TABLET, DELAYED RELEASE ORAL at 06:14

## 2024-08-13 RX ADMIN — NICOTINE POLACRILEX 2 MG: 2 GUM, CHEWING ORAL at 14:25

## 2024-08-13 RX ADMIN — LORATADINE 10 MG: 10 TABLET ORAL at 09:10

## 2024-08-13 RX ADMIN — CYANOCOBALAMIN TAB 1000 MCG 1000 MCG: 1000 TAB at 09:10

## 2024-08-13 RX ADMIN — CLOZAPINE 200 MG: 200 TABLET ORAL at 17:06

## 2024-08-13 RX ADMIN — FAMOTIDINE 20 MG: 20 TABLET ORAL at 09:10

## 2024-08-13 RX ADMIN — ESCITALOPRAM OXALATE 20 MG: 10 TABLET, FILM COATED ORAL at 09:10

## 2024-08-13 NOTE — PLAN OF CARE
Problem: Alteration in Thoughts and Perception  Goal: Treatment Goal: Gain control of psychotic behaviors/thinking, reduce/eliminate presenting symptoms and demonstrate improved reality functioning upon discharge  Outcome: Progressing  Goal: Verbalize thoughts and feelings  Description: Interventions:  - Promote a nonjudgmental and trusting relationship with the patient through active listening and therapeutic communication  - Assess patient's level of functioning, behavior and potential for risk  - Engage patient in 1 on 1 interactions  - Encourage patient to express fears, feelings, frustrations, and discuss symptoms    - [place] patient to reality, help patient recognize reality-based thinking   - Administer medications as ordered and assess for potential side effects  - Provide the patient education related to the signs and symptoms of the illness and desired effects of prescribed medications  Outcome: Progressing  Goal: Refrain from acting on delusional thinking/internal stimuli  Description: Interventions:  - Monitor patient closely, per order   - Utilize least restrictive measures   - Set reasonable limits, give positive feedback for acceptable   - Administer medications as ordered and monitor of potential side effects  Outcome: Progressing  Goal: Agree to be compliant with medication regime, as prescribed and report medication side effects  Description: Interventions:  - Offer appropriate PRN medication and supervise ingestion; conduct AIMS, as needed   Outcome: Progressing  Goal: Complete daily ADLs, including personal hygiene independently, as able  Description: Interventions:  - Observe, teach, and assist patient with ADLS  - Monitor and promote a balance of rest/activity, with adequate nutrition and elimination   Outcome: Progressing     Problem: Depression  Goal: Treatment Goal: Demonstrate behavioral control of depressive symptoms, verbalize feelings of improved mood/affect, and adopt new coping  skills prior to discharge  Outcome: Progressing  Goal: Verbalize thoughts and feelings  Description: Interventions:  - Assess and re-assess patient's level of risk   - Engage patient in 1:1 interactions, daily, for a minimum of 15 minutes   - Encourage patient to express feelings, fears, frustrations, hopes   Outcome: Progressing  Goal: Refrain from harming self  Description: Interventions:  - Monitor patient closely, per order   - Supervise medication ingestion, monitor effects and side effects   Outcome: Progressing  Goal: Refrain from self-neglect  Outcome: Progressing

## 2024-08-13 NOTE — NURSING NOTE
Alberto maintained on ongoing SAFE precaution without incident on this shift.  Awake, alert , visible, pleasant, and cooperative upon approach.   Attended and participated in 4 out of 9 groups today.  Continues to be compliant with medication regimen and had snack.  Spend most of his time taking with a female peer.  Both parties are maintaining proper distance, no inappropriate observed.  Denies any depression or anxiety.  No delusion  or A/T/V hallucination noted.  Behavioral control

## 2024-08-13 NOTE — CONSULTS
Consult Note- Podiatry   Alberto Berumen 28 y.o. male MRN: [id number]  Unit/Bed#: [facility] 108-02 Encounter: [id number]    Assessment & Plan     Assessment:  1. Ingrown toe nail  2. Pain toes b/l     Plan:  - -pt eval and managed    - Number and complexity of problems addressed:  1 undiagnosed new problem with uncertain prognosis   as shown    - Amount/complexity of data reviewed and analyzed:     Category 1: prior patient notes were analyzed today before evaluating and managing patient. All PMH were discussed with pt today.       - Risk of complications: moderate risk of morbidity from additional testing or treatment involved with this patient, which includes but not limited to:    - discussed anatomy, condition, treatment plan and options. They were instructed on proper foot care. The patient was seen today for greater than total of  45-59 minutes   . This is total time spent today involving both face-to-face time and non face-to-face time. This time spent includes  reviewing their past medical history  , performing a medically appropriate examination and evaluation of the patient, counseling and educating the patient,  documenting all findings in EMR, and independently interpreting results and communicating results with  patient     and discussing their condition and treatment options, risks, and potential complications. I have discussed the findings of this examination with the patient. The discussion included a complete verbal explanation of the examination results, diagnosis and planned treatment(s). A schedule for future care needs was explained. The patient has verbalized the understanding of these instructions at this time. If any questions should arise after returning home I have encouraged the patient to feel free to call the office.    - slant back procedures performed for patient today. Pt tolerated well  - All questions and concerns addressed.    - Podiatry signing off, thank you for the consult.      History of Present Illness     HPI:  Alberto Berumen is a 28 y.o. male who presents with painful, elongated toenails. Digging into the skin. They have difficulty applying their socks and shoes due to the elongation of the nails. The pressure within their shoe gear is painful and they have been unable to cut their nails adequately. Patient states pain is 1/10 in shoe gear. Pain with pressure.      Inpatient consult to Podiatry  Consult performed by: Josh Garza DPM  Consult ordered by: Jordan C Holter, DO        Review of Systems   Constitutional: Negative.    HENT: Negative.    Eyes: Negative.    Respiratory: Negative.    Cardiovascular: Negative.    Gastrointestinal: Negative.    Musculoskeletal: Negative   Skin: ingrown toe nail   Neurological: Negative.        Historical Information   Past Medical History:   Diagnosis Date    Anemia     Chronic idiopathic constipation     GERD (gastroesophageal reflux disease)     Schizoaffective disorder (HCC)     Syringoma     T wave inversion in EKG      Past Surgical History:   Procedure Laterality Date    COLONOSCOPY       Social History   Social History     Substance and Sexual Activity   Alcohol Use Not Currently    Comment: pt reports he does not drink     Social History     Substance and Sexual Activity   Drug Use Not Currently     Social History     Tobacco Use   Smoking Status Every Day    Current packs/day: 0.25    Types: Cigarettes   Smokeless Tobacco Never   Tobacco Comments    Pt reports he does not want to quit and does not want information. He declines Quitline information     Family History: History reviewed. No pertinent family history.    Meds/Allergies     Medications Prior to Admission:     benztropine (COGENTIN) 0.5 mg tablet    escitalopram (LEXAPRO) 10 mg tablet    haloperidol (HALDOL) 10 mg tablet    lithium carbonate (LITHOBID) 300 mg CR tablet    mirtazapine (REMERON) 30 mg tablet    OLANZapine (ZyPREXA) 20 MG tablet    traZODone (DESYREL) 50 mg  "tablet  Allergies   Allergen Reactions    Pollen Extract Sneezing       Objective   First Vitals:   Blood Pressure: 122/76 (03/29/24 2000)  Pulse: 100 (03/29/24 2000)  Temperature: 97.6 °F (36.4 °C) (03/29/24 2000)  Temp Source: Tympanic (03/29/24 2000)  Respirations: 18 (03/29/24 2000)  Height: 5' 6\" (167.6 cm) (03/29/24 2000)  Weight - Scale: 116 kg (256 lb 6.4 oz) (03/29/24 2000)  SpO2: 99 % (03/29/24 2000)    Current Vitals:   Blood Pressure: 136/62 (08/13/24 0804)  Pulse: 80 (08/13/24 0804)  Temperature: 97.8 °F (36.6 °C) (08/13/24 0804)  Temp Source: Temporal (08/13/24 0804)  Respirations: 18 (08/13/24 0804)  Height: 5' 6\" (167.6 cm) (03/29/24 2225)  Weight - Scale: 117 kg (258 lb 6.4 oz) (07/07/24 0730)  SpO2: 99 % (08/12/24 1516)    /62 (BP Location: Left arm)   Pulse 80   Temp 97.8 °F (36.6 °C) (Temporal)   Resp 18   Ht 5' 6\" (1.676 m)   Wt 117 kg (258 lb 6.4 oz)   SpO2 99%   BMI 41.71 kg/m²     General Appearance:    Alert, cooperative, no distress   Head:    Normocephalic, without obvious abnormality, atraumatic   Eyes:    PERRL, conjunctiva/corneas clear, EOM's intact            Nose:   Moist mucous membranes, no drainage or sinus tenderness   Throat:   No tenderness, no exudates   Neck:   Supple, symmetrical, trachea midline, no JVD   Back:     Symmetric, no CVA tenderness   Lungs:     Clear to auscultation bilaterally, respirations unlabored   Chest wall:    No tenderness or deformity   Heart:    Regular rate and rhythm, S1 and S2 normal, no murmur, rub   or gallop   Abdomen:     Soft, non-tender, bowel sounds active all four quadrants,     no masses, no organomegaly     Extremities:   MMT is 5/5 to all compartments of the LE, +0/4 edema B/L, Digital ROM is intact,    Pulses:   R DP is +2/4, R PT is +2/4, L DP is +2/4, L PT is +2/4, CFT< 3sec to all digits.    Skin:   Distal medial and distal lateral hallux toe nail borders b/l incurvated with pain to palpation, no erythema, NSOI     "   Neurologic:   CNII-XII intact. Normal strength, sensation and reflexes       Throughout. Gross sensation is intact. Protective sensation is intact       Lab Results:   No results displayed because visit has over 200 results.        Imaging: I have personally reviewed pertinent films in PACS  EKG, Pathology, and Other Studies: I have personally reviewed pertinent reports.      Code Status: Level 1 - Full Code  Advance Directive and Living Will:      Power of :    POLST:

## 2024-08-13 NOTE — PROGRESS NOTES
08/13/24 0724   Team Meeting   Meeting Type Daily Rounds   Team Members Present   Team Members Present Physician;Nurse;;Other (Discipline and Name)   Patient/Family Present   Patient Present No   Patient's Family Present No     In attendance:  Dr. Jordan Holter, DO Rachel Edelman, RN  Luisana Alvarado, [facility]  Carla Lux, [facility]  Irais Mcdowell, M.S.    Groups: 4/9    Pt visible, social with select peers. Pt needs podiatry consult. Pt reports AH are ongoing and remain unchanged.

## 2024-08-13 NOTE — PROGRESS NOTES
Psychiatric Progress Note - Department of Behavioral Health   Alberto Berumen 28 y.o. male MRN: [id number]  Unit/Bed#: [facility] 108-02 Encounter: [id number]    ASSESSMENT & PLAN     Diagnoses:   Principal Problem:    Schizoaffective disorder, bipolar type (HCC)  Active Problems:    GERD (gastroesophageal reflux disease)    Medical clearance for psychiatric admission    Tobacco abuse    T wave inversion in EKG    Chronic idiopathic constipation    Confluent and reticulate papillomatosis    Primary hypertension    Elevated hemoglobin A1c    Bilateral lower extremity edema      Treatment Recommendations/Precautions:  Continue to promote patient participation in therapeutic milieu.  Continue medical management per medicine.  Continue previously prescribed psychotropic medication regimen; see below.  Continue behavioral health checks q.7 minutes.   Continue vitals per behavioral health unit protocol.  Discharge date per primary team    SUBJECTIVE     Patient evaluated this a.m. for continuity of care. Patient was discussed at length with nursing and treatment team. Per nursing, patient remains calm and cooperative, intermittently interactive in the milieu, adherent to his medications without any acute adverse effects. No acute distress is noted throughout evaluation. Alberto Berumen denies suicidal/homicidal ideation in addition to thoughts of self-injury, receptive to crisis planning provided by this writer, contacting for safety in the inpatient setting, admitting to an ability to appropriately confide in staff including this writer.  Patient appears somewhat superficial on approach, initially denying any/all psychiatric complaint/concerns, although later alludes to intermittent instances of auditory hallucinations that are negative and derogatory in content, remaining somewhat suspicious and paranoid    PSYCHIATRIC REVIEW OF SYSTEMS     Behavior over the last 24 hours:  unchanged  Sleep: adequate  Appetite:  adequate  Medication side effects: No    REVIEW OF SYSTEMS   Review of systems: no complaints    OBJECTIVE     Vital Signs in Past 24 Hours:  Temp:  [97.8 °F (36.6 °C)-98.2 °F (36.8 °C)] 97.8 °F (36.6 °C)  HR:  [] 80  Resp:  [18] 18  BP: (136-146)/(62-88) 136/62    Intake/Output in Past 24 hours:  No intake/output data recorded.  No intake/output data recorded.        Laboratory Results:  I have personally reviewed all pertinent laboratory/tests results.  Most Recent Labs:   Lab Results   Component Value Date    WBC 9.85 08/08/2024    RBC 5.54 08/08/2024    HGB 13.0 08/08/2024    HCT 44.6 08/08/2024     08/08/2024    RDW 13.9 08/08/2024    NEUTROABS 5.85 08/08/2024    SODIUM 136 06/03/2024    K 3.8 06/03/2024    CL 96 06/03/2024    CO2 29 06/03/2024    BUN 10 06/03/2024    CREATININE 1.00 06/03/2024    GLUC 97 06/03/2024    GLUF 103 (H) 05/29/2024    CALCIUM 10.1 06/03/2024    AST 33 06/03/2024    ALT 72 (H) 06/03/2024    ALKPHOS 80 06/03/2024    TP 8.5 (H) 06/03/2024    ALB 4.7 06/03/2024    TBILI 0.30 06/03/2024    CHOLESTEROL 156 03/14/2024    HDL 44 03/14/2024    TRIG 133 03/14/2024    LDLCALC 85 03/14/2024    NONHDLC 112 03/14/2024    LITHIUM 0.61 01/09/2024    [id number] 1.062 11/15/2023    HGBA1C 5.0 04/01/2024    EAG 97 04/01/2024       Behavioral Health Medications: all current active meds have been reviewed and current meds:   Current Facility-Administered Medications   Medication Dose Route Frequency    acetaminophen (TYLENOL) tablet 650 mg  650 mg Oral Q4H PRN    acetaminophen (TYLENOL) tablet 650 mg  650 mg Oral Q6H PRN    aluminum-magnesium hydroxide-simethicone (MAALOX) oral suspension 30 mL  30 mL Oral Q4H PRN    amLODIPine (NORVASC) tablet 5 mg  5 mg Oral Daily    ARIPiprazole (ABILIFY) tablet 15 mg  15 mg Oral Daily    Artificial Tears ophthalmic solution 1 drop  1 drop Both Eyes Q3H PRN    atropine (ISOPTO ATROPINE) 1 % ophthalmic solution 1 drop  1 drop Sublingual Daily PRN     haloperidol lactate (HALDOL) injection 2.5 mg  2.5 mg Intramuscular Q4H PRN Max 4/day    And    LORazepam (ATIVAN) injection 1 mg  1 mg Intramuscular Q4H PRN Max 4/day    And    benztropine (COGENTIN) injection 0.5 mg  0.5 mg Intramuscular Q4H PRN Max 4/day    benztropine (COGENTIN) injection 1 mg  1 mg Intramuscular Q4H PRN Max 6/day    haloperidol lactate (HALDOL) injection 5 mg  5 mg Intramuscular Q4H PRN Max 4/day    And    LORazepam (ATIVAN) injection 2 mg  2 mg Intramuscular Q4H PRN Max 4/day    And    benztropine (COGENTIN) injection 1 mg  1 mg Intramuscular Q4H PRN Max 4/day    benztropine (COGENTIN) tablet 1 mg  1 mg Oral Q4H PRN Max 6/day    benztropine (COGENTIN) tablet 1 mg  1 mg Oral Q4H PRN Max 6/day    bisacodyl (DULCOLAX) rectal suppository 10 mg  10 mg Rectal Daily PRN    calcium carbonate (TUMS) chewable tablet 500 mg  500 mg Oral BID PRN    clozapine (CLOZARIL) tablet 200 mg  200 mg Oral Before Dinner    cloZAPine (CLOZARIL) tablet 300 mg  300 mg Oral HS    cyanocobalamin (VITAMIN B-12) tablet 1,000 mcg  1,000 mcg Oral Daily    hydrOXYzine HCL (ATARAX) tablet 50 mg  50 mg Oral Q6H PRN Max 4/day    Or    diphenhydrAMINE (BENADRYL) injection 50 mg  50 mg Intramuscular Q6H PRN    hydrOXYzine HCL (ATARAX) tablet 50 mg  50 mg Oral Q6H PRN Max 4/day    Or    diphenhydrAMINE (BENADRYL) injection 50 mg  50 mg Intramuscular Q6H PRN    diphenhydrAMINE-zinc acetate (BENADRYL) 2-0.1 % cream   Topical BID PRN    escitalopram (LEXAPRO) tablet 20 mg  20 mg Oral Daily    famotidine (PEPCID) tablet 20 mg  20 mg Oral BID    fluticasone (FLONASE) 50 mcg/act nasal spray 1 spray  1 spray Each Nare Daily    glycopyrrolate (ROBINUL) tablet 1 mg  1 mg Oral TID    haloperidol (HALDOL) tablet 1 mg  1 mg Oral Q6H PRN    haloperidol (HALDOL) tablet 2.5 mg  2.5 mg Oral Q4H PRN Max 4/day    haloperidol (HALDOL) tablet 5 mg  5 mg Oral Q4H PRN Max 4/day    hydroCHLOROthiazide tablet 12.5 mg  12.5 mg Oral Daily    hydrocortisone  1 % ointment   Topical 4x Daily PRN    hydrOXYzine HCL (ATARAX) tablet 100 mg  100 mg Oral Q6H PRN Max 4/day    Or    LORazepam (ATIVAN) injection 2 mg  2 mg Intramuscular Q6H PRN    hydrOXYzine HCL (ATARAX) tablet 100 mg  100 mg Oral Q6H PRN Max 4/day    Or    LORazepam (ATIVAN) injection 2 mg  2 mg Intramuscular Q6H PRN    hydrOXYzine HCL (ATARAX) tablet 25 mg  25 mg Oral Q6H PRN Max 4/day    ibuprofen (MOTRIN) tablet 600 mg  600 mg Oral Q8H PRN    lactated ringers infusion  50 mL/hr Intravenous Continuous    Lidocaine Viscous HCl (XYLOCAINE) 2 % mucosal solution 15 mL  15 mL Swish & Spit 4x Daily PRN    loratadine (CLARITIN) tablet 10 mg  10 mg Oral Daily    melatonin tablet 3 mg  3 mg Oral HS PRN    metFORMIN (GLUCOPHAGE) tablet 500 mg  500 mg Oral BID With Meals    methocarbamol (ROBAXIN) tablet 500 mg  500 mg Oral Q6H PRN    nicotine polacrilex (NICORETTE) gum 2 mg  2 mg Oral Q4H PRN    OLANZapine (ZyPREXA) tablet 5 mg  5 mg Oral Q4H PRN Max 3/day    Or    OLANZapine (ZyPREXA) IM injection 2.5 mg  2.5 mg Intramuscular Q4H PRN Max 3/day    OLANZapine (ZyPREXA) tablet 5 mg  5 mg Oral Q3H PRN Max 3/day    Or    OLANZapine (ZyPREXA) IM injection 5 mg  5 mg Intramuscular Q3H PRN Max 3/day    OLANZapine (ZyPREXA) tablet 2.5 mg  2.5 mg Oral Q4H PRN Max 6/day    ondansetron (ZOFRAN-ODT) dispersible tablet 4 mg  4 mg Oral Q6H PRN    pantoprazole (PROTONIX) EC tablet 20 mg  20 mg Oral Early Morning    polyethylene glycol (MIRALAX) packet 17 g  17 g Oral Daily PRN    propranolol (INDERAL) tablet 10 mg  10 mg Oral Q12H KAYLIE    senna-docusate sodium (SENOKOT S) 8.6-50 mg per tablet 1 tablet  1 tablet Oral Daily PRN    senna-docusate sodium (SENOKOT S) 8.6-50 mg per tablet 2 tablet  2 tablet Oral BID    traZODone (DESYREL) tablet 50 mg  50 mg Oral HS PRN    white petrolatum-mineral oil (EUCERIN,HYDROCERIN) cream   Topical TID PRN   .    Risks, benefits and possible side effects of Medications:   Risks, benefits, and possible  side effects of medications explained to patient and patient verbalizes understanding.      Mental Status Evaluation:  Appearance:  age appropriate, casually dressed, and disheveled   Behavior:  psychomotor retardation, calm, somewhat superficial and suspicious possessing limited eye contact   Speech:  soft and scant   Mood:  euthymic   Affect:  blunted and mood-incongruent   Language sparse   Thought Process:  goal directed   Thought Content:  no overt obsessions or delusions, negative thinking   Perceptual Disturbances: Auditory hallucinations without commands remaining internally preoccupied and distracted   Risk Potential: Suicidal Ideations none, Homicidal Ideations none, and Potential for Aggression No   Sensorium:  person, place, and time/date   Cognition:  recent and remote memory grossly intact   Consciousness:  alert and awake    Attention: attention span appeared shorter than expected for age   Insight:  limited   Judgment: limited   Intellect Not assessed   Gait/Station: normal gait/station and normal balance   Motor Activity: no abnormal movements     Memory: Short and long term memory fair     Progress Toward Goals: Unchanged, as evidenced by their participation (or lack thereof) in individual, social and therapeutic milieu in addition to adherence to their medication regimen.    Recommended Treatment:   See above for assessment and plan.    Inpatient Psychiatric Certification: Based upon physical, mental and social evaluations, I certify that inpatient psychiatric services are medically necessary for this patient for a duration of greater than 2 midnights for the treatment of Schizoaffective disorder, bipolar type (HCC) including psychotropic medication management, participation in the therapeutic milieu and referrals as indicated. Available alternative community resources do not meet the patient's mental health care needs. I further attest that an established written individualized plan of care has  been implemented and is outlined in the patient's medical records.    Counseling/Coordination of Care    Total unit time spent today was greater than 15 minutes. Greater than 50% of total time was spent with the patient and/or patient's relatives and/or coordination of patient's care.     Patient's rights, confidentiality, exceptions to confidentiality, use of electronic medical record including appropriate staff access to medical record regarding behavioral health services and consent to treatment were reviewed.    Note Share:     This note was not shared with the patient due to reasonable likelihood of causing patient harm     This note has been constructed using a voice recognition system.    There may be translation, syntax, or grammatical errors. If you have any questions, please contact the dictating provider.    Jordan Christopher Holter, DO 08/13/24

## 2024-08-13 NOTE — NURSING NOTE
Patient is blunted. Sleeping till late morning. Visible and social w/ select peers. Refused breakfast and ate 100% of lunch. Medication compliant. Disheveled appearance wearing stained sweatshirt. Attending some groups. AH present. Continuous rounding maintained.

## 2024-08-13 NOTE — NURSING NOTE
Pt is visible on the unit and social with select peers. Consumed 100% of dinner. Took medications without incidence. Pt is pleasant and cooperative. Attended 6/11 groups. Reports AH that threaten him and his family. No behavioral issues. Pt offers no complaints. VSS. Continuous safety checks maintained.

## 2024-08-13 NOTE — SOCIAL WORK
SW placed call to pt's aunt Hanh to attempt to help coordinate a visit and to discuss potential timeframe for discharge.   SW left voicemail requesting return call.

## 2024-08-14 PROCEDURE — 99232 SBSQ HOSP IP/OBS MODERATE 35: CPT | Performed by: PSYCHIATRY & NEUROLOGY

## 2024-08-14 RX ADMIN — NICOTINE POLACRILEX 2 MG: 2 GUM, CHEWING ORAL at 08:47

## 2024-08-14 RX ADMIN — CLOZAPINE 200 MG: 200 TABLET ORAL at 17:22

## 2024-08-14 RX ADMIN — AMLODIPINE BESYLATE 5 MG: 5 TABLET ORAL at 08:31

## 2024-08-14 RX ADMIN — PROPRANOLOL HYDROCHLORIDE 10 MG: 10 TABLET ORAL at 21:15

## 2024-08-14 RX ADMIN — METFORMIN HYDROCHLORIDE 500 MG: 500 TABLET, FILM COATED ORAL at 08:32

## 2024-08-14 RX ADMIN — NICOTINE POLACRILEX 2 MG: 2 GUM, CHEWING ORAL at 17:49

## 2024-08-14 RX ADMIN — NICOTINE POLACRILEX 2 MG: 2 GUM, CHEWING ORAL at 21:26

## 2024-08-14 RX ADMIN — CYANOCOBALAMIN TAB 1000 MCG 1000 MCG: 1000 TAB at 08:47

## 2024-08-14 RX ADMIN — PROPRANOLOL HYDROCHLORIDE 10 MG: 10 TABLET ORAL at 08:30

## 2024-08-14 RX ADMIN — ARIPIPRAZOLE 15 MG: 15 TABLET ORAL at 08:30

## 2024-08-14 RX ADMIN — GLYCOPYRROLATE 1 MG: 1 TABLET ORAL at 21:15

## 2024-08-14 RX ADMIN — HYDROCHLOROTHIAZIDE 12.5 MG: 12.5 TABLET ORAL at 08:31

## 2024-08-14 RX ADMIN — FAMOTIDINE 20 MG: 20 TABLET ORAL at 17:23

## 2024-08-14 RX ADMIN — PANTOPRAZOLE SODIUM 20 MG: 20 TABLET, DELAYED RELEASE ORAL at 06:06

## 2024-08-14 RX ADMIN — FLUTICASONE PROPIONATE 1 SPRAY: 50 SPRAY, METERED NASAL at 08:29

## 2024-08-14 RX ADMIN — SENNOSIDES AND DOCUSATE SODIUM 2 TABLET: 8.6; 5 TABLET ORAL at 08:32

## 2024-08-14 RX ADMIN — FAMOTIDINE 20 MG: 20 TABLET ORAL at 08:32

## 2024-08-14 RX ADMIN — SENNOSIDES AND DOCUSATE SODIUM 2 TABLET: 8.6; 5 TABLET ORAL at 17:22

## 2024-08-14 RX ADMIN — GLYCOPYRROLATE 1 MG: 1 TABLET ORAL at 08:31

## 2024-08-14 RX ADMIN — GLYCOPYRROLATE 1 MG: 1 TABLET ORAL at 17:23

## 2024-08-14 RX ADMIN — CLOZAPINE 300 MG: 200 TABLET ORAL at 21:14

## 2024-08-14 RX ADMIN — ESCITALOPRAM OXALATE 20 MG: 10 TABLET, FILM COATED ORAL at 08:30

## 2024-08-14 RX ADMIN — METFORMIN HYDROCHLORIDE 500 MG: 500 TABLET, FILM COATED ORAL at 17:22

## 2024-08-14 RX ADMIN — LORATADINE 10 MG: 10 TABLET ORAL at 08:32

## 2024-08-14 NOTE — PLAN OF CARE
Problem: Alteration in Thoughts and Perception  Goal: Treatment Goal: Gain control of psychotic behaviors/thinking, reduce/eliminate presenting symptoms and demonstrate improved reality functioning upon discharge  Outcome: Progressing  Goal: Verbalize thoughts and feelings  Description: Interventions:  - Promote a nonjudgmental and trusting relationship with the patient through active listening and therapeutic communication  - Assess patient's level of functioning, behavior and potential for risk  - Engage patient in 1 on 1 interactions  - Encourage patient to express fears, feelings, frustrations, and discuss symptoms    - [place] patient to reality, help patient recognize reality-based thinking   - Administer medications as ordered and assess for potential side effects  - Provide the patient education related to the signs and symptoms of the illness and desired effects of prescribed medications  Outcome: Progressing  Goal: Refrain from acting on delusional thinking/internal stimuli  Description: Interventions:  - Monitor patient closely, per order   - Utilize least restrictive measures   - Set reasonable limits, give positive feedback for acceptable   - Administer medications as ordered and monitor of potential side effects  Outcome: Progressing  Goal: Agree to be compliant with medication regime, as prescribed and report medication side effects  Description: Interventions:  - Offer appropriate PRN medication and supervise ingestion; conduct AIMS, as needed   Outcome: Progressing  Goal: Attend and participate in unit activities, including therapeutic, recreational, and educational groups  Description: Interventions:  -Encourage Visitation and family involvement in care  Outcome: Progressing  Goal: Complete daily ADLs, including personal hygiene independently, as able  Description: Interventions:  - Observe, teach, and assist patient with ADLS  - Monitor and promote a balance of rest/activity, with adequate  nutrition and elimination   Outcome: Progressing     Problem: Ineffective Coping  Goal: Patient/Family participate in treatment and DC plans  Description: Interventions:  - Provide therapeutic environment  Outcome: Progressing  Goal: Patient/Family verbalizes awareness of resources  Outcome: Progressing     Problem: Depression  Goal: Treatment Goal: Demonstrate behavioral control of depressive symptoms, verbalize feelings of improved mood/affect, and adopt new coping skills prior to discharge  Outcome: Progressing  Goal: Verbalize thoughts and feelings  Description: Interventions:  - Assess and re-assess patient's level of risk   - Engage patient in 1:1 interactions, daily, for a minimum of 15 minutes   - Encourage patient to express feelings, fears, frustrations, hopes   Outcome: Progressing  Goal: Refrain from harming self  Description: Interventions:  - Monitor patient closely, per order   - Supervise medication ingestion, monitor effects and side effects   Outcome: Progressing  Goal: Refrain from isolation  Description: Interventions:  - Develop a trusting relationship   - Encourage socialization   Outcome: Progressing  Goal: Refrain from self-neglect  Outcome: Progressing  Goal: Attend and participate in unit activities, including therapeutic, recreational, and educational groups  Description: Interventions:  - Provide therapeutic and educational activities daily, encourage attendance and participation, and document same in the medical record   Outcome: Progressing  Goal: Complete daily ADLs, including personal hygiene independently, as able  Description: Interventions:  - Observe, teach, and assist patient with ADLS  -  Monitor and promote a balance of rest/activity, with adequate nutrition and elimination   Outcome: Progressing     Problem: Anxiety  Goal: Anxiety is at manageable level  Description: Interventions:  - Assess and monitor patient's anxiety level.   - Monitor for signs and symptoms (heart  palpitations, chest pain, shortness of breath, headaches, nausea, feeling jumpy, restlessness, irritable, apprehensive).   - Collaborate with interdisciplinary team and initiate plan and interventions as ordered.  - [place] patient to unit/surroundings  - Explain treatment plan  - Encourage participation in care  - Encourage verbalization of concerns/fears  - Identify coping mechanisms  - Assist in developing anxiety-reducing skills  - Administer/offer alternative therapies  - Limit or eliminate stimulants  Outcome: Progressing     Problem: Alteration in Orientation  Goal: Allow medical examinations, as recommended  Description: Interventions:  - Provide physical/neurological exams and/or referrals, per provider   Outcome: Progressing     Problem: DISCHARGE PLANNING - CARE MANAGEMENT  Goal: Discharge to post-acute care or home with appropriate resources  Description: INTERVENTIONS:  - Conduct assessment to determine patient/family and health care team treatment goals, and need for post-acute services based on payer coverage, community resources, and patient preferences, and barriers to discharge  - Address psychosocial, clinical, and financial barriers to discharge as identified in assessment in conjunction with the patient/family and health care team  - Arrange appropriate level of post-acute services according to patient’s   needs and preference and payer coverage in collaboration with the physician and health care team  - Communicate with and update the patient/family, physician, and health care team regarding progress on the discharge plan  - Arrange appropriate transportation to post-acute venues  Outcome: Progressing

## 2024-08-14 NOTE — PROGRESS NOTES
08/14/24 0746   Team Meeting   Meeting Type Daily Rounds   Team Members Present   Team Members Present Physician;Nurse;;Other (Discipline and Name)   Patient/Family Present   Patient Present No   Patient's Family Present No     In attendance:  Dr. Jordan Holter, DO Rachel Edelman, RN  Luisana Alvarado, [facility]  Carla Lux, [facility]  Irais Mcdowell, M.S.    Groups: 6/11    Pt missed breakfast due to sleeping in late. Pt reports ongoing AH. Pt visible and social with female peer.

## 2024-08-14 NOTE — SOCIAL WORK
Pt's aunt called back and reported that her sister is going to be moving in with her and she no longer feels that pt can go back home to live with her. Aunt reports that she does not want to tell this to pt directly and now questions if he should go live with his sister in [place] instead or go into a [facility]. BRANDY explained that pt has not been placed on the housing list due to family placement having been an option all this time.   BRANDY will meet with aunt, pt and call pt's sister all together this afternoon to attempt to solidify discharge disposition.

## 2024-08-14 NOTE — SOCIAL WORK
SW and pt met 1:1  SW updated pt on aunt's visit today around 2pm.   SW reviewed feelings/thoughts related to potential discharge in the coming weeks. Pt shared some paranoia and anxiety related to unknowns and the ongoing voices. SW provided additional education regarding ACT and acknowledged pt has several options for housing including his aunt and his sister but we will need to know which for discharge planning and service coordination. Pt expressed a preference to reside with his aunt and expressed feeling happy she will come visit today.

## 2024-08-14 NOTE — PLAN OF CARE
Problem: Ineffective Coping  Goal: Identifies ineffective coping skills  Outcome: Progressing  Goal: Identifies healthy coping skills  Outcome: Progressing  Goal: Demonstrates healthy coping skills  Outcome: Progressing  Goal: Participates in unit activities  Description: Interventions:  - Provide therapeutic environment   - Provide required programming   - Redirect inappropriate behaviors   Outcome: Progressing    Attended 10/20 of the groups offered during the last 2 days.

## 2024-08-14 NOTE — NURSING NOTE
Received pt in bed at change of shift with eyes closed; chest movement noted.  Continues the same thus this far as per q 7 min room checks.   Will continue to monitor behavior, sleeping pattern and any medical issues that may arise.    0536:  Sleeping 7+ hrs thus this far

## 2024-08-14 NOTE — PROGRESS NOTES
Psychiatric Progress Note - Department of Behavioral Health   Alberto Berumen 28 y.o. male MRN: [id number]  Unit/Bed#: [facility] 108-02 Encounter: [id number]    ASSESSMENT & PLAN     Diagnoses:   Principal Problem:    Schizoaffective disorder, bipolar type (HCC)  Active Problems:    GERD (gastroesophageal reflux disease)    Medical clearance for psychiatric admission    Tobacco abuse    T wave inversion in EKG    Chronic idiopathic constipation    Confluent and reticulate papillomatosis    Primary hypertension    Elevated hemoglobin A1c    Bilateral lower extremity edema      Treatment Recommendations/Precautions:  Continue to promote patient participation in therapeutic milieu.  Continue medical management per medicine.  Continue previously prescribed psychotropic medication regimen; see below.  Continue behavioral health checks q.7 minutes.   Continue vitals per behavioral health unit protocol.  Discharge date per primary team    SUBJECTIVE     Patient evaluated this a.m. for continuity of care. Patient was discussed at length with nursing and treatment team. Per nursing, patient remains calm and cooperative, intermittently interactive in the milieu, and here to his medications without any acute adverse effects. No acute distress is noted throughout evaluation. Alberto Berumen denies suicidal/homicidal ideation in addition to thoughts of self-injury, receptive to crisis planning provided by this writer, contacting for safety in the inpatient setting, admitting to an ability to appropriately confide in staff including this writer.  Patient denies any/all psychiatric complaints/concerns aside from intermittent instances of negative and derogatory auditory hallucinations that allude to death of his relatives, remaining somewhat scant sparse in interaction involving this writer, suspicious, awaiting linkage with appropriate outpatient psychiatric providers    PSYCHIATRIC REVIEW OF SYSTEMS     Behavior over the last  24 hours:  unchanged  Sleep: adequate  Appetite: adequate  Medication side effects: No    REVIEW OF SYSTEMS   Review of systems: no complaints    OBJECTIVE     Vital Signs in Past 24 Hours:  Temp:  [97.6 °F (36.4 °C)-97.8 °F (36.6 °C)] 97.6 °F (36.4 °C)  HR:  [] 103  Resp:  [18] 18  BP: (110-136)/(62-75) 110/62    Intake/Output in Past 24 hours:  No intake/output data recorded.  No intake/output data recorded.        Laboratory Results:  I have personally reviewed all pertinent laboratory/tests results.  Most Recent Labs:   Lab Results   Component Value Date    WBC 9.85 08/08/2024    RBC 5.54 08/08/2024    HGB 13.0 08/08/2024    HCT 44.6 08/08/2024     08/08/2024    RDW 13.9 08/08/2024    NEUTROABS 5.85 08/08/2024    SODIUM 136 06/03/2024    K 3.8 06/03/2024    CL 96 06/03/2024    CO2 29 06/03/2024    BUN 10 06/03/2024    CREATININE 1.00 06/03/2024    GLUC 97 06/03/2024    GLUF 103 (H) 05/29/2024    CALCIUM 10.1 06/03/2024    AST 33 06/03/2024    ALT 72 (H) 06/03/2024    ALKPHOS 80 06/03/2024    TP 8.5 (H) 06/03/2024    ALB 4.7 06/03/2024    TBILI 0.30 06/03/2024    CHOLESTEROL 156 03/14/2024    HDL 44 03/14/2024    TRIG 133 03/14/2024    LDLCALC 85 03/14/2024    NONHDLC 112 03/14/2024    LITHIUM 0.61 01/09/2024    [id number] 1.062 11/15/2023    HGBA1C 5.0 04/01/2024    EAG 97 04/01/2024       Behavioral Health Medications: all current active meds have been reviewed and current meds:   Current Facility-Administered Medications   Medication Dose Route Frequency    acetaminophen (TYLENOL) tablet 650 mg  650 mg Oral Q4H PRN    acetaminophen (TYLENOL) tablet 650 mg  650 mg Oral Q6H PRN    aluminum-magnesium hydroxide-simethicone (MAALOX) oral suspension 30 mL  30 mL Oral Q4H PRN    amLODIPine (NORVASC) tablet 5 mg  5 mg Oral Daily    ARIPiprazole (ABILIFY) tablet 15 mg  15 mg Oral Daily    Artificial Tears ophthalmic solution 1 drop  1 drop Both Eyes Q3H PRN    atropine (ISOPTO ATROPINE) 1 % ophthalmic  solution 1 drop  1 drop Sublingual Daily PRN    haloperidol lactate (HALDOL) injection 2.5 mg  2.5 mg Intramuscular Q4H PRN Max 4/day    And    LORazepam (ATIVAN) injection 1 mg  1 mg Intramuscular Q4H PRN Max 4/day    And    benztropine (COGENTIN) injection 0.5 mg  0.5 mg Intramuscular Q4H PRN Max 4/day    benztropine (COGENTIN) injection 1 mg  1 mg Intramuscular Q4H PRN Max 6/day    haloperidol lactate (HALDOL) injection 5 mg  5 mg Intramuscular Q4H PRN Max 4/day    And    LORazepam (ATIVAN) injection 2 mg  2 mg Intramuscular Q4H PRN Max 4/day    And    benztropine (COGENTIN) injection 1 mg  1 mg Intramuscular Q4H PRN Max 4/day    benztropine (COGENTIN) tablet 1 mg  1 mg Oral Q4H PRN Max 6/day    benztropine (COGENTIN) tablet 1 mg  1 mg Oral Q4H PRN Max 6/day    bisacodyl (DULCOLAX) rectal suppository 10 mg  10 mg Rectal Daily PRN    calcium carbonate (TUMS) chewable tablet 500 mg  500 mg Oral BID PRN    clozapine (CLOZARIL) tablet 200 mg  200 mg Oral Before Dinner    cloZAPine (CLOZARIL) tablet 300 mg  300 mg Oral HS    cyanocobalamin (VITAMIN B-12) tablet 1,000 mcg  1,000 mcg Oral Daily    hydrOXYzine HCL (ATARAX) tablet 50 mg  50 mg Oral Q6H PRN Max 4/day    Or    diphenhydrAMINE (BENADRYL) injection 50 mg  50 mg Intramuscular Q6H PRN    hydrOXYzine HCL (ATARAX) tablet 50 mg  50 mg Oral Q6H PRN Max 4/day    Or    diphenhydrAMINE (BENADRYL) injection 50 mg  50 mg Intramuscular Q6H PRN    diphenhydrAMINE-zinc acetate (BENADRYL) 2-0.1 % cream   Topical BID PRN    escitalopram (LEXAPRO) tablet 20 mg  20 mg Oral Daily    famotidine (PEPCID) tablet 20 mg  20 mg Oral BID    fluticasone (FLONASE) 50 mcg/act nasal spray 1 spray  1 spray Each Nare Daily    glycopyrrolate (ROBINUL) tablet 1 mg  1 mg Oral TID    haloperidol (HALDOL) tablet 1 mg  1 mg Oral Q6H PRN    haloperidol (HALDOL) tablet 2.5 mg  2.5 mg Oral Q4H PRN Max 4/day    haloperidol (HALDOL) tablet 5 mg  5 mg Oral Q4H PRN Max 4/day    hydroCHLOROthiazide  tablet 12.5 mg  12.5 mg Oral Daily    hydrocortisone 1 % ointment   Topical 4x Daily PRN    hydrOXYzine HCL (ATARAX) tablet 100 mg  100 mg Oral Q6H PRN Max 4/day    Or    LORazepam (ATIVAN) injection 2 mg  2 mg Intramuscular Q6H PRN    hydrOXYzine HCL (ATARAX) tablet 100 mg  100 mg Oral Q6H PRN Max 4/day    Or    LORazepam (ATIVAN) injection 2 mg  2 mg Intramuscular Q6H PRN    hydrOXYzine HCL (ATARAX) tablet 25 mg  25 mg Oral Q6H PRN Max 4/day    ibuprofen (MOTRIN) tablet 600 mg  600 mg Oral Q8H PRN    lactated ringers infusion  50 mL/hr Intravenous Continuous    Lidocaine Viscous HCl (XYLOCAINE) 2 % mucosal solution 15 mL  15 mL Swish & Spit 4x Daily PRN    loratadine (CLARITIN) tablet 10 mg  10 mg Oral Daily    melatonin tablet 3 mg  3 mg Oral HS PRN    metFORMIN (GLUCOPHAGE) tablet 500 mg  500 mg Oral BID With Meals    methocarbamol (ROBAXIN) tablet 500 mg  500 mg Oral Q6H PRN    nicotine polacrilex (NICORETTE) gum 2 mg  2 mg Oral Q4H PRN    OLANZapine (ZyPREXA) tablet 5 mg  5 mg Oral Q4H PRN Max 3/day    Or    OLANZapine (ZyPREXA) IM injection 2.5 mg  2.5 mg Intramuscular Q4H PRN Max 3/day    OLANZapine (ZyPREXA) tablet 5 mg  5 mg Oral Q3H PRN Max 3/day    Or    OLANZapine (ZyPREXA) IM injection 5 mg  5 mg Intramuscular Q3H PRN Max 3/day    OLANZapine (ZyPREXA) tablet 2.5 mg  2.5 mg Oral Q4H PRN Max 6/day    ondansetron (ZOFRAN-ODT) dispersible tablet 4 mg  4 mg Oral Q6H PRN    pantoprazole (PROTONIX) EC tablet 20 mg  20 mg Oral Early Morning    polyethylene glycol (MIRALAX) packet 17 g  17 g Oral Daily PRN    propranolol (INDERAL) tablet 10 mg  10 mg Oral Q12H KAYLIE    senna-docusate sodium (SENOKOT S) 8.6-50 mg per tablet 1 tablet  1 tablet Oral Daily PRN    senna-docusate sodium (SENOKOT S) 8.6-50 mg per tablet 2 tablet  2 tablet Oral BID    traZODone (DESYREL) tablet 50 mg  50 mg Oral HS PRN    white petrolatum-mineral oil (EUCERIN,HYDROCERIN) cream   Topical TID PRN   .    Risks, benefits and possible side  effects of Medications:   Risks, benefits, and possible side effects of medications explained to patient and patient verbalizes understanding.      Mental Status Evaluation:  Appearance:  age appropriate, casually dressed, and possessing appropriate hygiene, marginal grooming, somewhat disheveled   Behavior:  psychomotor retardation, calm, superficial possessing limited eye contact   Speech:  soft and scant   Mood:  euthymic   Affect:  blunted and mood-incongruent   Language sparse   Thought Process:  goal directed   Thought Content:  no overt obsessions or delusions, negative thinking   Perceptual Disturbances: Auditory hallucinations without commands   Risk Potential: Suicidal Ideations none, Homicidal Ideations none, and Potential for Aggression No   Sensorium:  person, place, and time/date   Cognition:  recent and remote memory grossly intact   Consciousness:  alert and awake    Attention: attention span appeared shorter than expected for age   Insight:  limited   Judgment: limited   Intellect Not assessed   Gait/Station: normal gait/station and normal balance   Motor Activity: no abnormal movements     Memory: Short and long term memory  fair     Progress Toward Goals: unchanged, as evidenced by their participation (or lack thereof) in individual, social and therapeutic milieu in addition to adherence to their medication regimen.    Recommended Treatment:   See above for assessment and plan.    Inpatient Psychiatric Certification: Based upon physical, mental and social evaluations, I certify that inpatient psychiatric services are medically necessary for this patient for a duration of greater than 2 midnights for the treatment of Schizoaffective disorder, bipolar type (HCC) including psychotropic medication management, participation in the therapeutic milieu and referrals as indicated. Available alternative community resources do not meet the patient's mental health care needs. I further attest that an  established written individualized plan of care has been implemented and is outlined in the patient's medical records.    Counseling/Coordination of Care    Total unit time spent today was greater than 15 minutes. Greater than 50% of total time was spent with the patient and/or patient's relatives and/or coordination of patient's care.     Patient's rights, confidentiality, exceptions to confidentiality, use of electronic medical record including appropriate staff access to medical record regarding behavioral health services and consent to treatment were reviewed.    Note Share:     This note was not shared with the patient due to reasonable likelihood of causing patient harm     This note has been constructed using a voice recognition system.    There may be translation, syntax, or grammatical errors. If you have any questions, please contact the dictating provider.    Jordan Christopher Holter, DO 08/14/24

## 2024-08-14 NOTE — NURSING NOTE
Pt is calm, cooperative and visible on unit. Pt reports AH, depression and anxiety; denies SI/HI/VH. Pt reports looking forward to visit with aunt today. Social with staff and select peers. Will maintain q7 min checks.

## 2024-08-14 NOTE — SOCIAL WORK
SW facilitated a family session with pt's aunt and sister (via phone call) when aunt came in person. Family reported to pt during this session that they feel more comfortable if he goes to a group home and do not feel they can support him at home. SW inquired why this change now, as pt is nearing readiness for discharge, since the plan has been to discharge home to family. Aunt and sister gave  reasoning for the change. SW checked in with pt to determine thoughts and feelings. SW will give pt additional time to process and meet 1:1 tomorrow to discuss further. SW will assist pt with completing CRR application.

## 2024-08-14 NOTE — SOCIAL WORK
BRANDY placed additional call to pt's aunt Hanh; left vm requesting call back.     Aunt returned call. BRANDY provided update on pt's functioning and likely timeframe for discharge. Aunt reports pt may want to stay with her and may want to stay with sister in [place]. BRANDY coordinated for aunt to come in person for a visit - today at 2pm; approved by  and BRANDY. BRANDY will meet with pt and aunt first to discuss discharge opportunities then pt can do 1:1 visit with his aunt.

## 2024-08-15 ENCOUNTER — ANESTHESIA EVENT (INPATIENT)
Dept: PREOP/PACU | Facility: HOSPITAL | Age: 29
DRG: 750 | End: 2024-08-15
Payer: COMMERCIAL

## 2024-08-15 LAB
BASOPHILS # BLD AUTO: 0.06 THOUSANDS/ÂΜL (ref 0–0.1)
BASOPHILS NFR BLD AUTO: 1 % (ref 0–1)
BNP SERPL-MCNC: 10 PG/ML (ref 0–100)
CK SERPL-CCNC: 198 U/L (ref 39–308)
CLOZAPINE SERPL-MCNC: 475 NG/ML (ref 350–900)
CRP SERPL QL: 9.6 MG/L
EOSINOPHIL # BLD AUTO: 0.46 THOUSAND/ÂΜL (ref 0–0.61)
EOSINOPHIL NFR BLD AUTO: 4 % (ref 0–6)
ERYTHROCYTE [DISTWIDTH] IN BLOOD BY AUTOMATED COUNT: 14 % (ref 11.6–15.1)
HCT VFR BLD AUTO: 43.6 % (ref 36.5–49.3)
HGB BLD-MCNC: 13.2 G/DL (ref 12–17)
IMM GRANULOCYTES # BLD AUTO: 0.06 THOUSAND/UL (ref 0–0.2)
IMM GRANULOCYTES NFR BLD AUTO: 1 % (ref 0–2)
LYMPHOCYTES # BLD AUTO: 4.3 THOUSANDS/ÂΜL (ref 0.6–4.47)
LYMPHOCYTES NFR BLD AUTO: 35 % (ref 14–44)
MCH RBC QN AUTO: 23.7 PG (ref 26.8–34.3)
MCHC RBC AUTO-ENTMCNC: 30.3 G/DL (ref 31.4–37.4)
MCV RBC AUTO: 78 FL (ref 82–98)
MONOCYTES # BLD AUTO: 1.03 THOUSAND/ÂΜL (ref 0.17–1.22)
MONOCYTES NFR BLD AUTO: 8 % (ref 4–12)
NEUTROPHILS # BLD AUTO: 6.49 THOUSANDS/ÂΜL (ref 1.85–7.62)
NEUTS SEG NFR BLD AUTO: 51 % (ref 43–75)
NRBC BLD AUTO-RTO: 0 /100 WBCS
PLATELET # BLD AUTO: 278 THOUSANDS/UL (ref 149–390)
PMV BLD AUTO: 10.5 FL (ref 8.9–12.7)
RBC # BLD AUTO: 5.58 MILLION/UL (ref 3.88–5.62)
WBC # BLD AUTO: 12.4 THOUSAND/UL (ref 4.31–10.16)

## 2024-08-15 PROCEDURE — 85025 COMPLETE CBC W/AUTO DIFF WBC: CPT

## 2024-08-15 PROCEDURE — 82550 ASSAY OF CK (CPK): CPT

## 2024-08-15 PROCEDURE — 86140 C-REACTIVE PROTEIN: CPT

## 2024-08-15 PROCEDURE — 80159 DRUG ASSAY CLOZAPINE: CPT | Performed by: PSYCHIATRY & NEUROLOGY

## 2024-08-15 PROCEDURE — 99232 SBSQ HOSP IP/OBS MODERATE 35: CPT | Performed by: PSYCHIATRY & NEUROLOGY

## 2024-08-15 PROCEDURE — 83880 ASSAY OF NATRIURETIC PEPTIDE: CPT

## 2024-08-15 RX ORDER — SODIUM CHLORIDE, SODIUM LACTATE, POTASSIUM CHLORIDE, CALCIUM CHLORIDE 600; 310; 30; 20 MG/100ML; MG/100ML; MG/100ML; MG/100ML
125 INJECTION, SOLUTION INTRAVENOUS CONTINUOUS
OUTPATIENT
Start: 2024-08-15

## 2024-08-15 RX ADMIN — GLYCOPYRROLATE 1 MG: 1 TABLET ORAL at 21:19

## 2024-08-15 RX ADMIN — LORATADINE 10 MG: 10 TABLET ORAL at 08:46

## 2024-08-15 RX ADMIN — CLOZAPINE 300 MG: 200 TABLET ORAL at 21:19

## 2024-08-15 RX ADMIN — CLOZAPINE 200 MG: 200 TABLET ORAL at 17:09

## 2024-08-15 RX ADMIN — CYANOCOBALAMIN TAB 1000 MCG 1000 MCG: 1000 TAB at 08:46

## 2024-08-15 RX ADMIN — AMLODIPINE BESYLATE 5 MG: 5 TABLET ORAL at 08:46

## 2024-08-15 RX ADMIN — PROPRANOLOL HYDROCHLORIDE 10 MG: 10 TABLET ORAL at 21:18

## 2024-08-15 RX ADMIN — FAMOTIDINE 20 MG: 20 TABLET ORAL at 08:45

## 2024-08-15 RX ADMIN — SENNOSIDES AND DOCUSATE SODIUM 2 TABLET: 8.6; 5 TABLET ORAL at 08:44

## 2024-08-15 RX ADMIN — SENNOSIDES AND DOCUSATE SODIUM 2 TABLET: 8.6; 5 TABLET ORAL at 17:09

## 2024-08-15 RX ADMIN — FAMOTIDINE 20 MG: 20 TABLET ORAL at 17:09

## 2024-08-15 RX ADMIN — HYDROCHLOROTHIAZIDE 12.5 MG: 12.5 TABLET ORAL at 08:44

## 2024-08-15 RX ADMIN — GLYCOPYRROLATE 1 MG: 1 TABLET ORAL at 08:44

## 2024-08-15 RX ADMIN — NICOTINE POLACRILEX 2 MG: 2 GUM, CHEWING ORAL at 08:47

## 2024-08-15 RX ADMIN — METFORMIN HYDROCHLORIDE 500 MG: 500 TABLET, FILM COATED ORAL at 08:47

## 2024-08-15 RX ADMIN — NICOTINE POLACRILEX 2 MG: 2 GUM, CHEWING ORAL at 17:09

## 2024-08-15 RX ADMIN — PANTOPRAZOLE SODIUM 20 MG: 20 TABLET, DELAYED RELEASE ORAL at 06:14

## 2024-08-15 RX ADMIN — GLYCOPYRROLATE 1 MG: 1 TABLET ORAL at 17:09

## 2024-08-15 RX ADMIN — ARIPIPRAZOLE 15 MG: 15 TABLET ORAL at 08:46

## 2024-08-15 RX ADMIN — METFORMIN HYDROCHLORIDE 500 MG: 500 TABLET, FILM COATED ORAL at 17:09

## 2024-08-15 RX ADMIN — NICOTINE POLACRILEX 2 MG: 2 GUM, CHEWING ORAL at 13:30

## 2024-08-15 RX ADMIN — ESCITALOPRAM OXALATE 20 MG: 10 TABLET, FILM COATED ORAL at 08:45

## 2024-08-15 RX ADMIN — PROPRANOLOL HYDROCHLORIDE 10 MG: 10 TABLET ORAL at 08:46

## 2024-08-15 RX ADMIN — NICOTINE POLACRILEX 2 MG: 2 GUM, CHEWING ORAL at 21:21

## 2024-08-15 NOTE — PLAN OF CARE
Problem: Alteration in Thoughts and Perception  Goal: Treatment Goal: Gain control of psychotic behaviors/thinking, reduce/eliminate presenting symptoms and demonstrate improved reality functioning upon discharge  Outcome: Progressing  Goal: Refrain from acting on delusional thinking/internal stimuli  Description: Interventions:  - Monitor patient closely, per order   - Utilize least restrictive measures   - Set reasonable limits, give positive feedback for acceptable   - Administer medications as ordered and monitor of potential side effects  Outcome: Progressing  Goal: Agree to be compliant with medication regime, as prescribed and report medication side effects  Description: Interventions:  - Offer appropriate PRN medication and supervise ingestion; conduct AIMS, as needed   Outcome: Progressing  Goal: Attend and participate in unit activities, including therapeutic, recreational, and educational groups  Description: Interventions:  -Encourage Visitation and family involvement in care  Outcome: Progressing  Goal: Complete daily ADLs, including personal hygiene independently, as able  Description: Interventions:  - Observe, teach, and assist patient with ADLS  - Monitor and promote a balance of rest/activity, with adequate nutrition and elimination   Outcome: Progressing     Problem: Ineffective Coping  Goal: Demonstrates healthy coping skills  Outcome: Progressing  Goal: Participates in unit activities  Description: Interventions:  - Provide therapeutic environment   - Provide required programming   - Redirect inappropriate behaviors   Outcome: Progressing  Goal: Patient/Family verbalizes awareness of resources  Outcome: Progressing     Problem: Alteration in Thoughts and Perception  Goal: Verbalize thoughts and feelings  Description: Interventions:  - Promote a nonjudgmental and trusting relationship with the patient through active listening and therapeutic communication  - Assess patient's level of  functioning, behavior and potential for risk  - Engage patient in 1 on 1 interactions  - Encourage patient to express fears, feelings, frustrations, and discuss symptoms    - [employer] patient to reality, help patient recognize reality-based thinking   - Administer medications as ordered and assess for potential side effects  - Provide the patient education related to the signs and symptoms of the illness and desired effects of prescribed medications  Outcome: Not Progressing

## 2024-08-15 NOTE — NURSING NOTE
Alberto is calm and polite. There is no change in his demeanor. He had a visit today with his aunt but writer did not hear any of the feedback about the visit from Alberto. He did not offer any information about how  things went.

## 2024-08-15 NOTE — NURSING NOTE
"Alberto remains very quiet and superficial and difficult to engage. He did not want to talk about the conversation with his family or what transpired. He states \"everything else is the same\"  (Without being any more informative)  He takes Nicorette  twice a shift yet says he smokes 2 packs of cigarettes a week. He said he is going to try and not smoke when he gets discharged  "

## 2024-08-15 NOTE — NURSING NOTE
Received pt in bed at change of shift with eyes closed; chest movement noted.  Continues the same thus this far as per q 7 min room checks.   Will continue to monitor behavior, sleeping pattern and any medical issues that may arise.    0555:  Sleeping 7+ hrs thus this far

## 2024-08-15 NOTE — PROGRESS NOTES
Psychiatric Progress Note - Department of Behavioral Health   Alberto Berumen 28 y.o. male MRN: [id number]  Unit/Bed#: [facility] 108-02 Encounter: [id number]    ASSESSMENT & PLAN     Diagnoses:   Principal Problem:    Schizoaffective disorder, bipolar type (HCC)  Active Problems:    GERD (gastroesophageal reflux disease)    Medical clearance for psychiatric admission    Tobacco abuse    T wave inversion in EKG    Chronic idiopathic constipation    Confluent and reticulate papillomatosis    Primary hypertension    Elevated hemoglobin A1c    Bilateral lower extremity edema      Treatment Recommendations/Precautions:  Continue to promote patient participation in therapeutic milieu.  Continue medical management per medicine.  Continue previously prescribed psychotropic medication regimen; see below.  Continue behavioral health checks q.7 minutes.   Continue vitals per behavioral health unit protocol.  Discharge date per primary team    SUBJECTIVE     Patient evaluated this a.m. for continuity of care. Patient was discussed at length with nursing and treatment team. Per nursing, patient remains calm, cooperative, present in the milieu, adherent to his medications less any acute adverse effects. No acute distress is noted throughout evaluation. Alberto Berumen denies suicidal/homicidal ideation in addition to thoughts of self-injury, receptive to crisis planning provided by this writer, contacting for safety in the inpatient setting, admitting to an ability to appropriately confide in staff including this writer. Patient admits to dysphoric mood including anxiety, stating that this is related to intermittent instances of negative and derogatory auditory hallucinations.     PSYCHIATRIC REVIEW OF SYSTEMS     Behavior over the last 24 hours:  unchanged  Sleep: normal  Appetite: normal  Medication side effects: No    REVIEW OF SYSTEMS   Review of systems: no complaints    OBJECTIVE     Vital Signs in Past 24 Hours:  Temp:   [97.3 °F (36.3 °C)-97.5 °F (36.4 °C)] 97.3 °F (36.3 °C)  HR:  [] 82  Resp:  [18] 18  BP: (128-137)/(72-86) 128/77    Intake/Output in Past 24 hours:  No intake/output data recorded.  No intake/output data recorded.        Laboratory Results:  I have personally reviewed all pertinent laboratory/tests results.  Most Recent Labs:   Lab Results   Component Value Date    WBC 12.40 (H) 08/15/2024    RBC 5.58 08/15/2024    HGB 13.2 08/15/2024    HCT 43.6 08/15/2024     08/15/2024    RDW 14.0 08/15/2024    NEUTROABS 6.49 08/15/2024    SODIUM 136 06/03/2024    K 3.8 06/03/2024    CL 96 06/03/2024    CO2 29 06/03/2024    BUN 10 06/03/2024    CREATININE 1.00 06/03/2024    GLUC 97 06/03/2024    GLUF 103 (H) 05/29/2024    CALCIUM 10.1 06/03/2024    AST 33 06/03/2024    ALT 72 (H) 06/03/2024    ALKPHOS 80 06/03/2024    TP 8.5 (H) 06/03/2024    ALB 4.7 06/03/2024    TBILI 0.30 06/03/2024    CHOLESTEROL 156 03/14/2024    HDL 44 03/14/2024    TRIG 133 03/14/2024    LDLCALC 85 03/14/2024    NONHDLC 112 03/14/2024    LITHIUM 0.61 01/09/2024    [id number] 1.062 11/15/2023    HGBA1C 5.0 04/01/2024    EAG 97 04/01/2024       Behavioral Health Medications: all current active meds have been reviewed and current meds:   Current Facility-Administered Medications   Medication Dose Route Frequency    acetaminophen (TYLENOL) tablet 650 mg  650 mg Oral Q4H PRN    acetaminophen (TYLENOL) tablet 650 mg  650 mg Oral Q6H PRN    aluminum-magnesium hydroxide-simethicone (MAALOX) oral suspension 30 mL  30 mL Oral Q4H PRN    amLODIPine (NORVASC) tablet 5 mg  5 mg Oral Daily    ARIPiprazole (ABILIFY) tablet 15 mg  15 mg Oral Daily    Artificial Tears ophthalmic solution 1 drop  1 drop Both Eyes Q3H PRN    atropine (ISOPTO ATROPINE) 1 % ophthalmic solution 1 drop  1 drop Sublingual Daily PRN    haloperidol lactate (HALDOL) injection 2.5 mg  2.5 mg Intramuscular Q4H PRN Max 4/day    And    LORazepam (ATIVAN) injection 1 mg  1 mg  Intramuscular Q4H PRN Max 4/day    And    benztropine (COGENTIN) injection 0.5 mg  0.5 mg Intramuscular Q4H PRN Max 4/day    benztropine (COGENTIN) injection 1 mg  1 mg Intramuscular Q4H PRN Max 6/day    haloperidol lactate (HALDOL) injection 5 mg  5 mg Intramuscular Q4H PRN Max 4/day    And    LORazepam (ATIVAN) injection 2 mg  2 mg Intramuscular Q4H PRN Max 4/day    And    benztropine (COGENTIN) injection 1 mg  1 mg Intramuscular Q4H PRN Max 4/day    benztropine (COGENTIN) tablet 1 mg  1 mg Oral Q4H PRN Max 6/day    benztropine (COGENTIN) tablet 1 mg  1 mg Oral Q4H PRN Max 6/day    bisacodyl (DULCOLAX) rectal suppository 10 mg  10 mg Rectal Daily PRN    calcium carbonate (TUMS) chewable tablet 500 mg  500 mg Oral BID PRN    clozapine (CLOZARIL) tablet 200 mg  200 mg Oral Before Dinner    cloZAPine (CLOZARIL) tablet 300 mg  300 mg Oral HS    cyanocobalamin (VITAMIN B-12) tablet 1,000 mcg  1,000 mcg Oral Daily    hydrOXYzine HCL (ATARAX) tablet 50 mg  50 mg Oral Q6H PRN Max 4/day    Or    diphenhydrAMINE (BENADRYL) injection 50 mg  50 mg Intramuscular Q6H PRN    hydrOXYzine HCL (ATARAX) tablet 50 mg  50 mg Oral Q6H PRN Max 4/day    Or    diphenhydrAMINE (BENADRYL) injection 50 mg  50 mg Intramuscular Q6H PRN    diphenhydrAMINE-zinc acetate (BENADRYL) 2-0.1 % cream   Topical BID PRN    escitalopram (LEXAPRO) tablet 20 mg  20 mg Oral Daily    famotidine (PEPCID) tablet 20 mg  20 mg Oral BID    fluticasone (FLONASE) 50 mcg/act nasal spray 1 spray  1 spray Each Nare Daily    glycopyrrolate (ROBINUL) tablet 1 mg  1 mg Oral TID    haloperidol (HALDOL) tablet 1 mg  1 mg Oral Q6H PRN    haloperidol (HALDOL) tablet 2.5 mg  2.5 mg Oral Q4H PRN Max 4/day    haloperidol (HALDOL) tablet 5 mg  5 mg Oral Q4H PRN Max 4/day    hydroCHLOROthiazide tablet 12.5 mg  12.5 mg Oral Daily    hydrocortisone 1 % ointment   Topical 4x Daily PRN    hydrOXYzine HCL (ATARAX) tablet 100 mg  100 mg Oral Q6H PRN Max 4/day    Or    LORazepam (ATIVAN)  injection 2 mg  2 mg Intramuscular Q6H PRN    hydrOXYzine HCL (ATARAX) tablet 100 mg  100 mg Oral Q6H PRN Max 4/day    Or    LORazepam (ATIVAN) injection 2 mg  2 mg Intramuscular Q6H PRN    hydrOXYzine HCL (ATARAX) tablet 25 mg  25 mg Oral Q6H PRN Max 4/day    ibuprofen (MOTRIN) tablet 600 mg  600 mg Oral Q8H PRN    lactated ringers infusion  50 mL/hr Intravenous Continuous    Lidocaine Viscous HCl (XYLOCAINE) 2 % mucosal solution 15 mL  15 mL Swish & Spit 4x Daily PRN    loratadine (CLARITIN) tablet 10 mg  10 mg Oral Daily    melatonin tablet 3 mg  3 mg Oral HS PRN    metFORMIN (GLUCOPHAGE) tablet 500 mg  500 mg Oral BID With Meals    methocarbamol (ROBAXIN) tablet 500 mg  500 mg Oral Q6H PRN    nicotine polacrilex (NICORETTE) gum 2 mg  2 mg Oral Q4H PRN    OLANZapine (ZyPREXA) tablet 5 mg  5 mg Oral Q4H PRN Max 3/day    Or    OLANZapine (ZyPREXA) IM injection 2.5 mg  2.5 mg Intramuscular Q4H PRN Max 3/day    OLANZapine (ZyPREXA) tablet 5 mg  5 mg Oral Q3H PRN Max 3/day    Or    OLANZapine (ZyPREXA) IM injection 5 mg  5 mg Intramuscular Q3H PRN Max 3/day    OLANZapine (ZyPREXA) tablet 2.5 mg  2.5 mg Oral Q4H PRN Max 6/day    ondansetron (ZOFRAN-ODT) dispersible tablet 4 mg  4 mg Oral Q6H PRN    pantoprazole (PROTONIX) EC tablet 20 mg  20 mg Oral Early Morning    polyethylene glycol (MIRALAX) packet 17 g  17 g Oral Daily PRN    propranolol (INDERAL) tablet 10 mg  10 mg Oral Q12H KAYLIE    senna-docusate sodium (SENOKOT S) 8.6-50 mg per tablet 1 tablet  1 tablet Oral Daily PRN    senna-docusate sodium (SENOKOT S) 8.6-50 mg per tablet 2 tablet  2 tablet Oral BID    traZODone (DESYREL) tablet 50 mg  50 mg Oral HS PRN    white petrolatum-mineral oil (EUCERIN,HYDROCERIN) cream   Topical TID PRN   .    Risks, benefits and possible side effects of Medications:   Risks, benefits, and possible side effects of medications explained to patient and patient verbalizes understanding.      Mental Status Evaluation:  Appearance:  age  appropriate, casually dressed, and disheveled   Behavior:  psychomotor retardation   Speech:  soft   Mood:  euthymic   Affect:  blunted   Language sparse   Thought Process:  goal directed   Thought Content:  Negative thinking   Perceptual Disturbances: Auditory hallucinations without commands   Risk Potential: Suicidal Ideations none, Homicidal Ideations none, and Potential for Aggression No   Sensorium:  person, place, and time/date   Cognition:  recent and remote memory grossly intact   Consciousness:  alert and awake    Attention: attention span appeared shorter than expected for age   Insight:  limited   Judgment: limited   Intellect Not assessed   Gait/Station: normal gait/station and normal balance   Motor Activity: no abnormal movements     Memory: Short and long term memory  fair     Progress Toward Goals: unchanged, as evidenced by their participation (or lack thereof) in individual, social and therapeutic milieu in addition to adherence to their medication regimen.    Recommended Treatment:   See above for assessment and plan.    Inpatient Psychiatric Certification: Based upon physical, mental and social evaluations, I certify that inpatient psychiatric services are medically necessary for this patient for a duration of greater than 2 midnights for the treatment of Schizoaffective disorder, bipolar type (HCC) including psychotropic medication management, participation in the therapeutic milieu and referrals as indicated. Available alternative community resources do not meet the patient's mental health care needs. I further attest that an established written individualized plan of care has been implemented and is outlined in the patient's medical records.    Counseling/Coordination of Care    Total unit time spent today was greater than 15 minutes. Greater than 50% of total time was spent with the patient and/or patient's relatives and/or coordination of patient's care.     Patient's rights,  confidentiality, exceptions to confidentiality, use of electronic medical record including appropriate staff access to medical record regarding behavioral health services and consent to treatment were reviewed.    Note Share:     This note was not shared with the patient due to reasonable likelihood of causing patient harm     This note has been constructed using a voice recognition system.    There may be translation, syntax, or grammatical errors. If you have any questions, please contact the dictating provider.    Jordan Christopher Holter, DO 08/15/24

## 2024-08-15 NOTE — PROGRESS NOTES
08/15/24 0739   Team Meeting   Meeting Type Daily Rounds   Team Members Present   Team Members Present Physician;Nurse;;Other (Discipline and Name)   Patient/Family Present   Patient Present No   Patient's Family Present No     In attendance:  Dr. Jordan Holter, DO Rachel Edelman, RN  Luisnaa Alvarado, [facility]  Carla Lux, CHRISW    Groups: 6/9    Pt family now reporting pt cannot discharge home to them. Pt had in person visit with aunt that went well. Pt visible, calm, cooperative. No bx issues noted. Pt is reportedly officially dating female peer on the unit.

## 2024-08-15 NOTE — SOCIAL WORK
SW and pt met 1:1  SW assisted pt with completing residential services referral. Pt was polite, engaged, forthcoming. Reviewed pt's work history and current desires for his life post-discharge.   Pt reports being willing to submit the CRR referral at this time but ultimately is unsure what he would want to do and reports some anxiety regarding this sudden change in support level from his family. SW assisted with processing.     CRR referral completed and submitted to DS.

## 2024-08-15 NOTE — NURSING NOTE
Pt visible on unit. Pt reports AH, depression and anxiety; denies all other psych signs and symptoms. Took medications without difficulty. Pt overslept for breakfast suggested pt stay up after morning VS. No complaints at this time. Will maintain q7 min checks.

## 2024-08-16 ENCOUNTER — APPOINTMENT (INPATIENT)
Dept: PREOP/PACU | Facility: HOSPITAL | Age: 29
DRG: 750 | End: 2024-08-16
Attending: PSYCHIATRY & NEUROLOGY
Payer: COMMERCIAL

## 2024-08-16 ENCOUNTER — ANESTHESIA (INPATIENT)
Dept: PREOP/PACU | Facility: HOSPITAL | Age: 29
DRG: 750 | End: 2024-08-16
Payer: COMMERCIAL

## 2024-08-16 PROCEDURE — 90870 ELECTROCONVULSIVE THERAPY: CPT

## 2024-08-16 PROCEDURE — GZB2ZZZ ELECTROCONVULSIVE THERAPY, BILATERAL-SINGLE SEIZURE: ICD-10-PCS | Performed by: PSYCHIATRY & NEUROLOGY

## 2024-08-16 PROCEDURE — 90870 ELECTROCONVULSIVE THERAPY: CPT | Performed by: PSYCHIATRY & NEUROLOGY

## 2024-08-16 RX ORDER — HYDRALAZINE HYDROCHLORIDE 20 MG/ML
INJECTION INTRAMUSCULAR; INTRAVENOUS AS NEEDED
Status: DISCONTINUED | OUTPATIENT
Start: 2024-08-16 | End: 2024-08-16

## 2024-08-16 RX ORDER — KETOROLAC TROMETHAMINE 30 MG/ML
INJECTION, SOLUTION INTRAMUSCULAR; INTRAVENOUS AS NEEDED
Status: DISCONTINUED | OUTPATIENT
Start: 2024-08-16 | End: 2024-08-16

## 2024-08-16 RX ORDER — MIDAZOLAM HYDROCHLORIDE 2 MG/2ML
INJECTION, SOLUTION INTRAMUSCULAR; INTRAVENOUS AS NEEDED
Status: DISCONTINUED | OUTPATIENT
Start: 2024-08-16 | End: 2024-08-16

## 2024-08-16 RX ORDER — SODIUM CHLORIDE, SODIUM LACTATE, POTASSIUM CHLORIDE, CALCIUM CHLORIDE 600; 310; 30; 20 MG/100ML; MG/100ML; MG/100ML; MG/100ML
INJECTION, SOLUTION INTRAVENOUS CONTINUOUS PRN
Status: DISCONTINUED | OUTPATIENT
Start: 2024-08-16 | End: 2024-08-16

## 2024-08-16 RX ORDER — SUCCINYLCHOLINE CHLORIDE 100 MG/5ML
SYRINGE (ML) INTRAVENOUS AS NEEDED
Status: DISCONTINUED | OUTPATIENT
Start: 2024-08-16 | End: 2024-08-16

## 2024-08-16 RX ORDER — GLYCOPYRROLATE 0.2 MG/ML
INJECTION INTRAMUSCULAR; INTRAVENOUS AS NEEDED
Status: DISCONTINUED | OUTPATIENT
Start: 2024-08-16 | End: 2024-08-16

## 2024-08-16 RX ORDER — ESMOLOL HYDROCHLORIDE 10 MG/ML
INJECTION INTRAVENOUS AS NEEDED
Status: DISCONTINUED | OUTPATIENT
Start: 2024-08-16 | End: 2024-08-16

## 2024-08-16 RX ORDER — ETOMIDATE 2 MG/ML
INJECTION INTRAVENOUS AS NEEDED
Status: DISCONTINUED | OUTPATIENT
Start: 2024-08-16 | End: 2024-08-16

## 2024-08-16 RX ADMIN — SENNOSIDES AND DOCUSATE SODIUM 2 TABLET: 8.6; 5 TABLET ORAL at 08:34

## 2024-08-16 RX ADMIN — METFORMIN HYDROCHLORIDE 500 MG: 500 TABLET, FILM COATED ORAL at 17:14

## 2024-08-16 RX ADMIN — HYDRALAZINE HYDROCHLORIDE 20 MG: 20 INJECTION INTRAMUSCULAR; INTRAVENOUS at 06:46

## 2024-08-16 RX ADMIN — FAMOTIDINE 20 MG: 20 TABLET ORAL at 08:33

## 2024-08-16 RX ADMIN — KETOROLAC TROMETHAMINE 30 MG: 30 INJECTION, SOLUTION INTRAMUSCULAR; INTRAVENOUS at 06:46

## 2024-08-16 RX ADMIN — ESCITALOPRAM OXALATE 20 MG: 10 TABLET, FILM COATED ORAL at 08:32

## 2024-08-16 RX ADMIN — METFORMIN HYDROCHLORIDE 500 MG: 500 TABLET, FILM COATED ORAL at 08:33

## 2024-08-16 RX ADMIN — MIDAZOLAM 2 MG: 1 INJECTION INTRAMUSCULAR; INTRAVENOUS at 06:58

## 2024-08-16 RX ADMIN — SENNOSIDES AND DOCUSATE SODIUM 2 TABLET: 8.6; 5 TABLET ORAL at 17:14

## 2024-08-16 RX ADMIN — CLOZAPINE 200 MG: 200 TABLET ORAL at 17:14

## 2024-08-16 RX ADMIN — FAMOTIDINE 20 MG: 20 TABLET ORAL at 17:14

## 2024-08-16 RX ADMIN — NICOTINE POLACRILEX 2 MG: 2 GUM, CHEWING ORAL at 17:15

## 2024-08-16 RX ADMIN — PROPRANOLOL HYDROCHLORIDE 10 MG: 10 TABLET ORAL at 21:26

## 2024-08-16 RX ADMIN — CYANOCOBALAMIN TAB 1000 MCG 1000 MCG: 1000 TAB at 08:32

## 2024-08-16 RX ADMIN — ETOMIDATE 20 MG: 20 INJECTION, SOLUTION INTRAVENOUS at 06:47

## 2024-08-16 RX ADMIN — PANTOPRAZOLE SODIUM 20 MG: 20 TABLET, DELAYED RELEASE ORAL at 08:41

## 2024-08-16 RX ADMIN — GLYCOPYRROLATE 1 MG: 1 TABLET ORAL at 08:39

## 2024-08-16 RX ADMIN — GLYCOPYRROLATE 1 MG: 1 TABLET ORAL at 21:26

## 2024-08-16 RX ADMIN — NICOTINE POLACRILEX 2 MG: 2 GUM, CHEWING ORAL at 12:52

## 2024-08-16 RX ADMIN — GLYCOPYRROLATE 1 MG: 1 TABLET ORAL at 17:14

## 2024-08-16 RX ADMIN — FLUTICASONE PROPIONATE 1 SPRAY: 50 SPRAY, METERED NASAL at 08:41

## 2024-08-16 RX ADMIN — NICOTINE POLACRILEX 2 MG: 2 GUM, CHEWING ORAL at 21:26

## 2024-08-16 RX ADMIN — AMLODIPINE BESYLATE 5 MG: 5 TABLET ORAL at 05:21

## 2024-08-16 RX ADMIN — PROPRANOLOL HYDROCHLORIDE 10 MG: 10 TABLET ORAL at 05:19

## 2024-08-16 RX ADMIN — GLYCOPYRROLATE 0.4 MG: 0.2 INJECTION, SOLUTION INTRAMUSCULAR; INTRAVENOUS at 06:45

## 2024-08-16 RX ADMIN — LORATADINE 10 MG: 10 TABLET ORAL at 08:32

## 2024-08-16 RX ADMIN — CLOZAPINE 300 MG: 200 TABLET ORAL at 21:26

## 2024-08-16 RX ADMIN — HYDROCHLOROTHIAZIDE 12.5 MG: 12.5 TABLET ORAL at 05:20

## 2024-08-16 RX ADMIN — ARIPIPRAZOLE 15 MG: 15 TABLET ORAL at 08:32

## 2024-08-16 RX ADMIN — ESMOLOL HYDROCHLORIDE 50 MG: 10 INJECTION, SOLUTION INTRAVENOUS at 06:47

## 2024-08-16 RX ADMIN — SODIUM CHLORIDE, SODIUM LACTATE, POTASSIUM CHLORIDE, AND CALCIUM CHLORIDE: .6; .31; .03; .02 INJECTION, SOLUTION INTRAVENOUS at 06:10

## 2024-08-16 RX ADMIN — SUCCINYLCHOLINE CHLORIDE 140 EACH: 20 INJECTION, SOLUTION INTRAMUSCULAR; INTRAVENOUS at 06:47

## 2024-08-16 NOTE — PROCEDURES
Procedure Note - ECT  Alberto Berumen 28 y.o. male MRN: [id number]    Time out was taken with staff to confirm correct patient and correct procedure to be performed. Patient denies any sort of adverse effects from previous instances of ECT aside from episodes of transient memory loss, receptive to psychoeducation provided, acknowledging improvement in his clinical signs/symptoms.    Session Number: maintenance    Diagnosis: Principal Problem:    Schizoaffective disorder, bipolar type ([facility])  Active Problems:    GERD (gastroesophageal reflux disease)    Medical clearance for psychiatric admission    Tobacco abuse    T wave inversion in EKG    Chronic idiopathic constipation    Confluent and reticulate papillomatosis    Primary hypertension    Elevated hemoglobin A1c    Bilateral lower extremity edema    ECT Type: Inpatient    Anesthesia: etomidate (amidate)    Electrode Placement: bilateral    Energy level: 100%    Seizure Duration     EE sec  EM sec    PSI not available     Results:Clinical seizure was satisfactory, Patient tolerated ECT well    Vitals:    24 0725   BP: 150/64   Pulse: 102   Resp: (!) 26   Temp:    SpO2: 97%        Medication Administration - last 24 hours from 08/15/2024 0729 to 2024 0729         Date/Time Order Dose Route Action Action by     2024 0521 EDT amLODIPine (NORVASC) tablet 5 mg 5 mg Oral Given Mireya Pearson RN     08/15/2024 0846 EDT amLODIPine (NORVASC) tablet 5 mg 5 mg Oral Given Brooke Sneed LPN     08/15/2024 0845 EDT escitalopram (LEXAPRO) tablet 20 mg 20 mg Oral Given Brooke Sneed LPN     2024 0519 EDT propranolol (INDERAL) tablet 10 mg 10 mg Oral Given Mireya Pearson RN     08/15/2024 2118 EDT propranolol (INDERAL) tablet 10 mg 10 mg Oral Given Kristyn Heredia RN     08/15/2024 0846 EDT propranolol (INDERAL) tablet 10 mg 10 mg Oral Given Brooke Sneed LPN     08/15/2024 1709 EDT senna-docusate sodium (SENOKOT S) 8.6-50 mg per tablet 2 tablet  2 tablet Oral Given Kristyn Heredia RN     08/15/2024 0844 EDT senna-docusate sodium (SENOKOT S) 8.6-50 mg per tablet 2 tablet 2 tablet Oral Given Brooke Sneed LPN     08/15/2024 0846 EDT cyanocobalamin (VITAMIN B-12) tablet 1,000 mcg 1,000 mcg Oral Given Brooke Sneed LPN     08/15/2024 2121 EDT nicotine polacrilex (NICORETTE) gum 2 mg 2 mg Oral Given Kristyn Heredia RN     08/15/2024 1709 EDT nicotine polacrilex (NICORETTE) gum 2 mg 2 mg Oral Given Kristyn Heredia RN     08/15/2024 1330 EDT nicotine polacrilex (NICORETTE) gum 2 mg 2 mg Oral Given Brooke Sneed LPN     08/15/2024 0847 EDT nicotine polacrilex (NICORETTE) gum 2 mg 2 mg Oral Given Brooke Sneed LPN     08/15/2024 0848 EDT fluticasone (FLONASE) 50 mcg/act nasal spray 1 spray 1 spray Each Nare Not Given Brooke Sneed LPN     08/15/2024 0846 EDT loratadine (CLARITIN) tablet 10 mg 10 mg Oral Given Brooke Sneed LPN     08/16/2024 0520 EDT hydroCHLOROthiazide tablet 12.5 mg 12.5 mg Oral Given Mireya Pearson RN     08/15/2024 0844 EDT hydroCHLOROthiazide tablet 12.5 mg 12.5 mg Oral Given Brooke Sneed LPN     08/15/2024 1709 EDT metFORMIN (GLUCOPHAGE) tablet 500 mg 500 mg Oral Given Kristyn Heredia RN     08/15/2024 0847 EDT metFORMIN (GLUCOPHAGE) tablet 500 mg 500 mg Oral Given Brooke Sneed LPN     08/15/2024 2119 EDT glycopyrrolate (ROBINUL) tablet 1 mg 1 mg Oral Given Kristyn Heredia RN     08/15/2024 1709 EDT glycopyrrolate (ROBINUL) tablet 1 mg 1 mg Oral Given Kristyn Heredia RN     08/15/2024 0844 EDT glycopyrrolate (ROBINUL) tablet 1 mg 1 mg Oral Given Brooke Sneed LPN     08/15/2024 0846 EDT ARIPiprazole (ABILIFY) tablet 15 mg 15 mg Oral Given Brooke Sneed LPN     08/16/2024 0524 EDT pantoprazole (PROTONIX) EC tablet 20 mg 20 mg Oral Not Given Mireya Pearson, MIGUEL     08/15/2024 1709 EDT famotidine (PEPCID) tablet 20 mg 20 mg Oral Given Kristyn Heredia, MIGUEL     08/15/2024 0845 EDT famotidine (PEPCID) tablet 20 mg 20 mg Oral Given Brooke Sneed LPN      08/15/2024 1709 EDT clozapine (CLOZARIL) tablet 200 mg 200 mg Oral Given Kristyn Heredia RN     08/15/2024 2119 EDT cloZAPine (CLOZARIL) tablet 300 mg 300 mg Oral Given Mary Polizzano, RN Jordan Christopher Holter, DO

## 2024-08-16 NOTE — NURSING NOTE
Pt returned back from ECT without issue. Pt reports AH, depression and anxiety. Compliant with medications. Social with staff and peers. No behavioral issues. Will maintain q7 min checks.    17-Oct-2022 13:11

## 2024-08-16 NOTE — ANESTHESIA PREPROCEDURE EVALUATION
Procedure:  ELECTROCONVULSIVE THERAPY (ECT)    Relevant Problems   ANESTHESIA (within normal limits)      CARDIO   (+) Primary hypertension      GI/HEPATIC   (+) GERD (gastroesophageal reflux disease)        Physical Exam    Airway    Mallampati score: III  TM Distance: >3 FB  Neck ROM: full     Dental   No notable dental hx     Cardiovascular      Pulmonary      Other Findings        Anesthesia Plan  ASA Score- 3     Anesthesia Type- general with ASA Monitors.         Additional Monitors:     Airway Plan:            Plan Factors-Exercise tolerance (METS): >4 METS.    Chart reviewed.    Patient summary reviewed.    Patient is not a current smoker.  Patient did not smoke on day of surgery.            Induction- intravenous.    Postoperative Plan-     Perioperative Resuscitation Plan - Level 1 - Full Code.       Informed Consent- Anesthetic plan and risks discussed with patient.  I personally reviewed this patient with the CRNA. Discussed and agreed on the Anesthesia Plan with the CRNA..

## 2024-08-16 NOTE — PROGRESS NOTES
08/16/24 0742   Team Meeting   Meeting Type Daily Rounds   Team Members Present   Team Members Present Physician;Nurse;;Other (Discipline and Name)   Patient/Family Present   Patient Present No   Patient's Family Present No     In attendance:  Dr. Jordan Holter, DO Daniel Teles, RN  Luisana Alvarado, [facility]  Carla Lux, [facility]  Irais Mcdowell, M.S.    Groups: 9/10    Pt had labs completed. Pt received ECT this morning. No bx issues other than eating peer's food from tray when peer gave it to him. Pt reports ongoing AH. CRR referral completed.

## 2024-08-16 NOTE — PLAN OF CARE
Problem: Ineffective Coping  Goal: Identifies ineffective coping skills  Outcome: Progressing  Goal: Identifies healthy coping skills  Outcome: Progressing  Goal: Demonstrates healthy coping skills  Outcome: Progressing  Goal: Participates in unit activities  Description: Interventions:  - Provide therapeutic environment   - Provide required programming   - Redirect inappropriate behaviors   Outcome: Progressing    Attended 25/38 of the groups offered during the past 4 days.

## 2024-08-16 NOTE — PLAN OF CARE
Problem: Alteration in Thoughts and Perception  Goal: Treatment Goal: Gain control of psychotic behaviors/thinking, reduce/eliminate presenting symptoms and demonstrate improved reality functioning upon discharge  Outcome: Progressing  Goal: Verbalize thoughts and feelings  Description: Interventions:  - Promote a nonjudgmental and trusting relationship with the patient through active listening and therapeutic communication  - Assess patient's level of functioning, behavior and potential for risk  - Engage patient in 1 on 1 interactions  - Encourage patient to express fears, feelings, frustrations, and discuss symptoms    - [place] patient to reality, help patient recognize reality-based thinking   - Administer medications as ordered and assess for potential side effects  - Provide the patient education related to the signs and symptoms of the illness and desired effects of prescribed medications  Outcome: Progressing  Goal: Refrain from acting on delusional thinking/internal stimuli  Description: Interventions:  - Monitor patient closely, per order   - Utilize least restrictive measures   - Set reasonable limits, give positive feedback for acceptable   - Administer medications as ordered and monitor of potential side effects  Outcome: Progressing  Goal: Agree to be compliant with medication regime, as prescribed and report medication side effects  Description: Interventions:  - Offer appropriate PRN medication and supervise ingestion; conduct AIMS, as needed   Outcome: Progressing  Goal: Attend and participate in unit activities, including therapeutic, recreational, and educational groups  Description: Interventions:  -Encourage Visitation and family involvement in care  Outcome: Progressing  Goal: Complete daily ADLs, including personal hygiene independently, as able  Description: Interventions:  - Observe, teach, and assist patient with ADLS  - Monitor and promote a balance of rest/activity, with adequate  nutrition and elimination   Outcome: Progressing     Problem: Ineffective Coping  Goal: Identifies ineffective coping skills  Outcome: Progressing  Goal: Patient/Family participate in treatment and DC plans  Description: Interventions:  - Provide therapeutic environment  Outcome: Progressing  Goal: Patient/Family verbalizes awareness of resources  Outcome: Progressing     Problem: Depression  Goal: Treatment Goal: Demonstrate behavioral control of depressive symptoms, verbalize feelings of improved mood/affect, and adopt new coping skills prior to discharge  Outcome: Progressing  Goal: Verbalize thoughts and feelings  Description: Interventions:  - Assess and re-assess patient's level of risk   - Engage patient in 1:1 interactions, daily, for a minimum of 15 minutes   - Encourage patient to express feelings, fears, frustrations, hopes   Outcome: Progressing  Goal: Refrain from harming self  Description: Interventions:  - Monitor patient closely, per order   - Supervise medication ingestion, monitor effects and side effects   Outcome: Progressing  Goal: Refrain from isolation  Description: Interventions:  - Develop a trusting relationship   - Encourage socialization   Outcome: Progressing  Goal: Refrain from self-neglect  Outcome: Progressing  Goal: Attend and participate in unit activities, including therapeutic, recreational, and educational groups  Description: Interventions:  - Provide therapeutic and educational activities daily, encourage attendance and participation, and document same in the medical record   Outcome: Progressing  Goal: Complete daily ADLs, including personal hygiene independently, as able  Description: Interventions:  - Observe, teach, and assist patient with ADLS  -  Monitor and promote a balance of rest/activity, with adequate nutrition and elimination   Outcome: Progressing     Problem: Anxiety  Goal: Anxiety is at manageable level  Description: Interventions:  - Assess and monitor  patient's anxiety level.   - Monitor for signs and symptoms (heart palpitations, chest pain, shortness of breath, headaches, nausea, feeling jumpy, restlessness, irritable, apprehensive).   - Collaborate with interdisciplinary team and initiate plan and interventions as ordered.  - [place] patient to unit/surroundings  - Explain treatment plan  - Encourage participation in care  - Encourage verbalization of concerns/fears  - Identify coping mechanisms  - Assist in developing anxiety-reducing skills  - Administer/offer alternative therapies  - Limit or eliminate stimulants  Outcome: Progressing     Problem: Alteration in Orientation  Goal: Treatment Goal: Demonstrate a reduction of confusion and improved orientation to person, place, time and/or situation upon discharge, according to optimum baseline/ability  Outcome: Progressing  Goal: Interact with staff daily  Description: Interventions:  - Assess and re-assess patient's level of orientation  - Engage patient in 1 on 1 interactions, daily, for a minimum of 15 minutes   - Establish rapport/trust with patient   Outcome: Progressing  Goal: Cooperate with recommended testing/procedures  Description: Interventions:  - Determine need for ancillary testing  - Observe for mental status changes  - Implement falls/precaution protocol   Outcome: Progressing  Goal: Attend and participate in unit activities, including therapeutic, recreational, and educational groups  Description: Interventions:  - Provide therapeutic and educational activities daily, encourage attendance and participation, and document same in the medical record   - Provide appropriate opportunities for reminiscence   - Provide a consistent daily routine   - Encourage family contact/visitation   Outcome: Progressing     Problem: DISCHARGE PLANNING - CARE MANAGEMENT  Goal: Discharge to post-acute care or home with appropriate resources  Description: INTERVENTIONS:  - Conduct assessment to determine  patient/family and health care team treatment goals, and need for post-acute services based on payer coverage, community resources, and patient preferences, and barriers to discharge  - Address psychosocial, clinical, and financial barriers to discharge as identified in assessment in conjunction with the patient/family and health care team  - Arrange appropriate level of post-acute services according to patient’s   needs and preference and payer coverage in collaboration with the physician and health care team  - Communicate with and update the patient/family, physician, and health care team regarding progress on the discharge plan  - Arrange appropriate transportation to post-acute venues  Outcome: Progressing

## 2024-08-16 NOTE — NURSING NOTE
0356;  Received pt in bed at change of shift with eyes closed; chest movement noted.  Continues the same thus this far as per q 7 min room checks.   Will continue to monitor behavior, sleeping pattern and any medical issues that may arise.     NPO status for ECT this am    0600:  Transported to PACU  with staff via  at 0600 for ECT.

## 2024-08-16 NOTE — PROGRESS NOTES
"Progress Note - Behavioral Health   Alberto Berumen 28 y.o. male MRN: [id number]  Unit/Bed#: [facility] 108-02 Encounter: [id number]    This note was not shared with the patient due to reasonable likelihood of causing patient harm    Patient was seen today for continuation of care, records reviewed and patient was discussed with the morning case review team. Per nursing report, patient had his last ECT treatment yesterday. He remains visible and social with select peers. Patient has been sleeping well and eating his meals. He has been medication compliant. Patient attends some groups. He continues to report hearing voices. No major concerns or acute events in the past 24 hours.     Alberto was seen today for psychiatric follow-up. On assessment today, Alberto was approached while laying in bed. He states that his mood is \"so-so\". Patient appears blunted throughout conversation today. He states that sleep has been alright and his energy levels have been good. Reports that his appetite has been good and he has been eating his 3 meals. Patient denies SI/HI/VH.  However, patient reports AH and states that they are without commands. Informed patient to notify the nursing staff if these voices worsen and patient was understanding. He denies any other concerns at this time.    Alberto denies acute suicidal/self-harm ideation/intent/plan upon direct inquiry at this time. Alberto remains behaviorally appropriate, no agitation or aggression noted on exam or in report. Alberto also denies HI/VH, and does not appear overtly manic. Patient reports experiencing AH but reports that they are without commands. Alberto appears preoccupied throughout encounter today. No overt delusions or paranoia are verbalized. Alberto remains adherent to his current psychotropic medication regimen and denies any side effects from medications, as well as none noted on exam.    Vitals:  Vitals:    08/17/24 0751   BP: 110/68   Pulse: 84   Resp: 18   Temp: " 97.9 °F (36.6 °C)   SpO2: 98%       Laboratory Results:  I have personally reviewed all pertinent laboratory/tests results.    Psychiatric Review of Systems:  Behavior over the last 24 hours:  unchanged.   Sleep: Adequate  Appetite: Adequate  Medication side effects: Denies  ROS: no complaints    Mental Status Evaluation:  Appearance:  disheveled, bearded   Behavior:  psychomotor retardation, superficial   Speech:  scant, soft   Mood:  dysphoric   Affect:  blunted   Thought Process:  coherent   Thought Content:  negative thinking, no overt paranoia noted on exam   Perceptual Disturbances: Auditory hallucinations without commands, appears internally preoccupied   Risk Potential: Suicidal ideation - None at present  Homicidal ideation - None at present  Potential for aggression - Not at present   Memory:  recent and remote memory grossly intact   Sensorium  person, place, and time/date      Consciousness:  alert and awake   Attention: attention span and concentration appear shorter than expected for age   Insight:  limited   Judgment: limited   Gait/Station: in bed   Motor Activity: no abnormal movements     Progress Toward Goals:   Alberto is progressing towards goals of inpatient psychiatric treatment by continued medication compliance and is attending therapeutic modalities on the milieu. However, the patient continues to require inpatient psychiatric hospitalization for continued medication management and titration to optimize symptom reduction, improve sleep hygiene, and demonstrate adequate self-care.    Assessment & Plan   Principal Problem:    Schizoaffective disorder, bipolar type (HCC)  Active Problems:    GERD (gastroesophageal reflux disease)    Medical clearance for psychiatric admission    Tobacco abuse    T wave inversion in EKG    Chronic idiopathic constipation    Confluent and reticulate papillomatosis    Primary hypertension    Elevated hemoglobin A1c    Bilateral lower extremity  edema      Recommended Treatment: Treatment plan and medication changes discussed and per the attending physician the plan is:    1.Continue with group therapy, milieu therapy and occupational therapy  2.Behavioral Health checks every 7 minutes  3.Continue frequent safety checks and vitals per unit protocol  4.Continue with SLIM medical management as indicated  5.Continue with current medication regimen: Abilify 15 mg daily, clozapine 200 mg before dinner and clozapine 300 mg at bedtime, Lexapro 20 mg daily, propranolol 10 mg twice daily  6.Will continue to monitor labs   7.Disposition Planning: Discharge planning and efforts remain ongoing    Behavioral Health Medications: all current active meds have been reviewed and continue current psychiatric medications.  Current Facility-Administered Medications   Medication Dose Route Frequency Provider Last Rate    acetaminophen  650 mg Oral Q4H PRN Jordan C Holter, DO      acetaminophen  650 mg Oral Q6H PRN HOLLI Lion      aluminum-magnesium hydroxide-simethicone  30 mL Oral Q4H PRN Jordan C Holter, DO      amLODIPine  5 mg Oral Daily HOLLI Lion      ARIPiprazole  15 mg Oral Daily Bora Rosario MD      Artificial Tears  1 drop Both Eyes Q3H PRN Jordan C Holter, DO      atropine  1 drop Sublingual Daily PRN HOLLI Lion      haloperidol lactate  2.5 mg Intramuscular Q4H PRN Max 4/day HOLLI Lion      And    LORazepam  1 mg Intramuscular Q4H PRN Max 4/day HOLLI Lion      And    benztropine  0.5 mg Intramuscular Q4H PRN Max 4/day HOLLI Lion      benztropine  1 mg Intramuscular Q4H PRN Max 6/day Jordan C Holter, DO      haloperidol lactate  5 mg Intramuscular Q4H PRN Max 4/day HOLLI Lion      And    LORazepam  2 mg Intramuscular Q4H PRN Max 4/day HOLLI Lion      And    benztropine  1 mg Intramuscular Q4H PRN Max 4/day HOLLI Lion      benztropine  1 mg Oral Q4H PRN Max 6/day HOLLI Lion       benztropine  1 mg Oral Q4H PRN Max 6/day [facility] Holter, DO      bisacodyl  10 mg Rectal Daily PRN HOLLI Lion      calcium carbonate  500 mg Oral BID PRN HOLLI Monge      cloZAPine  200 mg Oral Before Dinner Bora Rosario MD      cloZAPine  300 mg Oral HS Bora Rosario MD      cyanocobalamin  1,000 mcg Oral Daily HOLLI Galvan      hydrOXYzine HCL  50 mg Oral Q6H PRN Max 4/day [facility] Holter, DO      Or    diphenhydrAMINE  50 mg Intramuscular Q6H PRN [facility] Holter, DO      hydrOXYzine HCL  50 mg Oral Q6H PRN Max 4/day HOLLI Lion      Or    diphenhydrAMINE  50 mg Intramuscular Q6H PRN HOLLI Lion      diphenhydrAMINE-zinc acetate   Topical BID PRN HOLLI Lion      escitalopram  20 mg Oral Daily HOLLI Lion      famotidine  20 mg Oral BID HOLLI Monge      fluticasone  1 spray Each Nare Daily Brina Guillen MD      glycopyrrolate  1 mg Oral TID Bora Rosario MD      haloperidol  1 mg Oral Q6H PRN HOLLI Lion      haloperidol  2.5 mg Oral Q4H PRN Max 4/day HOLLI Lion      haloperidol  5 mg Oral Q4H PRN Max 4/day HOLLI Lion      hydroCHLOROthiazide  12.5 mg Oral Daily HOLLI Galvan      hydrocortisone   Topical 4x Daily PRN HOLLI Lion      hydrOXYzine HCL  100 mg Oral Q6H PRN Max 4/day [facility] Holter, DO      Or    LORazepam  2 mg Intramuscular Q6H PRN [facility] Holter, DO      hydrOXYzine HCL  100 mg Oral Q6H PRN Max 4/day HOLLI Lion      Or    LORazepam  2 mg Intramuscular Q6H PRN HOLLI Lion      hydrOXYzine HCL  25 mg Oral Q6H PRN Max 4/day [facility] Holter, DO      ibuprofen  600 mg Oral Q8H PRN HOLLI Lion      lactated ringers  50 mL/hr Intravenous Continuous Antwan Dong MD      Lidocaine Viscous HCl  15 mL Swish & Spit 4x Daily PRN HOLLI Galvan      loratadine  10 mg Oral Daily Brina Guillen MD      melatonin  3 mg Oral HS PRN Bora Rosario MD       metFORMIN  500 mg Oral BID With Meals HOLLI Galvan      methocarbamol  500 mg Oral Q6H PRN HOLLI Lion      nicotine polacrilex  2 mg Oral Q4H PRN Bora Rosario MD      OLANZapine  5 mg Oral Q4H PRN Max 3/day [facility] Holter, DO      Or    OLANZapine  2.5 mg Intramuscular Q4H PRN Max 3/day [facility] Holter, DO      OLANZapine  5 mg Oral Q3H PRN Max 3/day [facility] Holter, DO      Or    OLANZapine  5 mg Intramuscular Q3H PRN Max 3/day [facility] Holter, DO      OLANZapine  2.5 mg Oral Q4H PRN Max 6/day [facility] Holter, DO      ondansetron  4 mg Oral Q6H PRN HOLLI Lion      pantoprazole  20 mg Oral Early Morning Eileenaddie CherryHOLLI Jimenez      polyethylene glycol  17 g Oral Daily PRN [facility] Holter, DO      propranolol  10 mg Oral Q12H KAYLIE HOLLI Lion      senna-docusate sodium  1 tablet Oral Daily PRN [facility] Cresencioter, DO      senna-docusate sodium  2 tablet Oral BID HOLLI Lion      traZODone  50 mg Oral HS PRN HOLLI Lion      white petrolatum-mineral oil   Topical TID PRN HOLLI Lion         Risks / Benefits of Treatment:  Risks, benefits, and possible side effects of medications explained to patient and patient verbalizes understanding and agreement for treatment.    Counseling / Coordination of Care:  Patient's progress reviewed with nursing staff.  Medications, treatment progress and treatment plan reviewed with patient.  Supportive counseling provided to the patient.    Total floor/unit time spent today 15 minutes. Greater than 50% of total time was spent with the patient and / or family counseling and / or coordination of care. A description of the counseling / coordination of care: medication education, treatment plan, supportive therapy.    Eveline Anglin PA-C  08/17/24

## 2024-08-16 NOTE — PROGRESS NOTES
Psychiatric Progress Note - Department of Behavioral Health   Alberto Berumen 28 y.o. male MRN: [id number]  Unit/Bed#: [facility] 108-02 Encounter: [id number]    ASSESSMENT & PLAN     Diagnoses:   Principal Problem:    Schizoaffective disorder, bipolar type (HCC)  Active Problems:    GERD (gastroesophageal reflux disease)    Medical clearance for psychiatric admission    Tobacco abuse    T wave inversion in EKG    Chronic idiopathic constipation    Confluent and reticulate papillomatosis    Primary hypertension    Elevated hemoglobin A1c    Bilateral lower extremity edema      Treatment Recommendations/Precautions:  Continue to promote patient participation in therapeutic milieu.  Continue medical management per medicine.  Continue previously prescribed psychotropic medication regimen; see below.  Continue behavioral health checks q.7 minutes.   Continue vitals per behavioral health unit protocol.  Discharge date per primary team    SUBJECTIVE     Patient evaluated this a.m. for continuity of care. Patient was discussed at length with nursing and treatment team. Per nursing, patient remains calm, cooperative, present in the milieu, adherent to his medications less any acute adverse effects, appearing to have tolerated ECT without incident. No acute distress is noted throughout evaluation. Alberto Berumen denies suicidal/homicidal ideation in addition to thoughts of self-injury, receptive to crisis planning provided by this writer, contacting for safety in the inpatient setting, admitting to an ability to appropriately confide in staff including this writer. Patient appears somewhat scant and sparse, admitting to intermittent instances of auditory hallucinations that are negative and derogatory on content accompanied by anxious mood, pending residential placement.    PSYCHIATRIC REVIEW OF SYSTEMS     Behavior over the last 24 hours:  unchanged  Sleep: adequate  Appetite: adequate  Medication side effects:  No    REVIEW OF SYSTEMS   Review of systems: no complaints    OBJECTIVE     Vital Signs in Past 24 Hours:  Temp:  [97.5 °F (36.4 °C)-98.4 °F (36.9 °C)] 97.6 °F (36.4 °C)  HR:  [] 102  Resp:  [17-29] 18  BP: (116-167)/(58-91) 138/77    Intake/Output in Past 24 hours:  I/O last 3 completed shifts:  In: 400 [I.V.:400]  Out: -   No intake/output data recorded.        Laboratory Results:  I have personally reviewed all pertinent laboratory/tests results.  Most Recent Labs:   Lab Results   Component Value Date    WBC 12.40 (H) 08/15/2024    RBC 5.58 08/15/2024    HGB 13.2 08/15/2024    HCT 43.6 08/15/2024     08/15/2024    RDW 14.0 08/15/2024    NEUTROABS 6.49 08/15/2024    SODIUM 136 06/03/2024    K 3.8 06/03/2024    CL 96 06/03/2024    CO2 29 06/03/2024    BUN 10 06/03/2024    CREATININE 1.00 06/03/2024    GLUC 97 06/03/2024    GLUF 103 (H) 05/29/2024    CALCIUM 10.1 06/03/2024    AST 33 06/03/2024    ALT 72 (H) 06/03/2024    ALKPHOS 80 06/03/2024    TP 8.5 (H) 06/03/2024    ALB 4.7 06/03/2024    TBILI 0.30 06/03/2024    CHOLESTEROL 156 03/14/2024    HDL 44 03/14/2024    TRIG 133 03/14/2024    LDLCALC 85 03/14/2024    NONHDLC 112 03/14/2024    LITHIUM 0.61 01/09/2024    [id number] 1.062 11/15/2023    HGBA1C 5.0 04/01/2024    EAG 97 04/01/2024       Behavioral Health Medications: all current active meds have been reviewed and current meds:   Current Facility-Administered Medications   Medication Dose Route Frequency    acetaminophen (TYLENOL) tablet 650 mg  650 mg Oral Q4H PRN    acetaminophen (TYLENOL) tablet 650 mg  650 mg Oral Q6H PRN    aluminum-magnesium hydroxide-simethicone (MAALOX) oral suspension 30 mL  30 mL Oral Q4H PRN    amLODIPine (NORVASC) tablet 5 mg  5 mg Oral Daily    ARIPiprazole (ABILIFY) tablet 15 mg  15 mg Oral Daily    Artificial Tears ophthalmic solution 1 drop  1 drop Both Eyes Q3H PRN    atropine (ISOPTO ATROPINE) 1 % ophthalmic solution 1 drop  1 drop Sublingual Daily PRN     haloperidol lactate (HALDOL) injection 2.5 mg  2.5 mg Intramuscular Q4H PRN Max 4/day    And    LORazepam (ATIVAN) injection 1 mg  1 mg Intramuscular Q4H PRN Max 4/day    And    benztropine (COGENTIN) injection 0.5 mg  0.5 mg Intramuscular Q4H PRN Max 4/day    benztropine (COGENTIN) injection 1 mg  1 mg Intramuscular Q4H PRN Max 6/day    haloperidol lactate (HALDOL) injection 5 mg  5 mg Intramuscular Q4H PRN Max 4/day    And    LORazepam (ATIVAN) injection 2 mg  2 mg Intramuscular Q4H PRN Max 4/day    And    benztropine (COGENTIN) injection 1 mg  1 mg Intramuscular Q4H PRN Max 4/day    benztropine (COGENTIN) tablet 1 mg  1 mg Oral Q4H PRN Max 6/day    benztropine (COGENTIN) tablet 1 mg  1 mg Oral Q4H PRN Max 6/day    bisacodyl (DULCOLAX) rectal suppository 10 mg  10 mg Rectal Daily PRN    calcium carbonate (TUMS) chewable tablet 500 mg  500 mg Oral BID PRN    clozapine (CLOZARIL) tablet 200 mg  200 mg Oral Before Dinner    cloZAPine (CLOZARIL) tablet 300 mg  300 mg Oral HS    cyanocobalamin (VITAMIN B-12) tablet 1,000 mcg  1,000 mcg Oral Daily    hydrOXYzine HCL (ATARAX) tablet 50 mg  50 mg Oral Q6H PRN Max 4/day    Or    diphenhydrAMINE (BENADRYL) injection 50 mg  50 mg Intramuscular Q6H PRN    hydrOXYzine HCL (ATARAX) tablet 50 mg  50 mg Oral Q6H PRN Max 4/day    Or    diphenhydrAMINE (BENADRYL) injection 50 mg  50 mg Intramuscular Q6H PRN    diphenhydrAMINE-zinc acetate (BENADRYL) 2-0.1 % cream   Topical BID PRN    escitalopram (LEXAPRO) tablet 20 mg  20 mg Oral Daily    famotidine (PEPCID) tablet 20 mg  20 mg Oral BID    fluticasone (FLONASE) 50 mcg/act nasal spray 1 spray  1 spray Each Nare Daily    glycopyrrolate (ROBINUL) tablet 1 mg  1 mg Oral TID    haloperidol (HALDOL) tablet 1 mg  1 mg Oral Q6H PRN    haloperidol (HALDOL) tablet 2.5 mg  2.5 mg Oral Q4H PRN Max 4/day    haloperidol (HALDOL) tablet 5 mg  5 mg Oral Q4H PRN Max 4/day    hydroCHLOROthiazide tablet 12.5 mg  12.5 mg Oral Daily    hydrocortisone  1 % ointment   Topical 4x Daily PRN    hydrOXYzine HCL (ATARAX) tablet 100 mg  100 mg Oral Q6H PRN Max 4/day    Or    LORazepam (ATIVAN) injection 2 mg  2 mg Intramuscular Q6H PRN    hydrOXYzine HCL (ATARAX) tablet 100 mg  100 mg Oral Q6H PRN Max 4/day    Or    LORazepam (ATIVAN) injection 2 mg  2 mg Intramuscular Q6H PRN    hydrOXYzine HCL (ATARAX) tablet 25 mg  25 mg Oral Q6H PRN Max 4/day    ibuprofen (MOTRIN) tablet 600 mg  600 mg Oral Q8H PRN    lactated ringers infusion  50 mL/hr Intravenous Continuous    Lidocaine Viscous HCl (XYLOCAINE) 2 % mucosal solution 15 mL  15 mL Swish & Spit 4x Daily PRN    loratadine (CLARITIN) tablet 10 mg  10 mg Oral Daily    melatonin tablet 3 mg  3 mg Oral HS PRN    metFORMIN (GLUCOPHAGE) tablet 500 mg  500 mg Oral BID With Meals    methocarbamol (ROBAXIN) tablet 500 mg  500 mg Oral Q6H PRN    nicotine polacrilex (NICORETTE) gum 2 mg  2 mg Oral Q4H PRN    OLANZapine (ZyPREXA) tablet 5 mg  5 mg Oral Q4H PRN Max 3/day    Or    OLANZapine (ZyPREXA) IM injection 2.5 mg  2.5 mg Intramuscular Q4H PRN Max 3/day    OLANZapine (ZyPREXA) tablet 5 mg  5 mg Oral Q3H PRN Max 3/day    Or    OLANZapine (ZyPREXA) IM injection 5 mg  5 mg Intramuscular Q3H PRN Max 3/day    OLANZapine (ZyPREXA) tablet 2.5 mg  2.5 mg Oral Q4H PRN Max 6/day    ondansetron (ZOFRAN-ODT) dispersible tablet 4 mg  4 mg Oral Q6H PRN    pantoprazole (PROTONIX) EC tablet 20 mg  20 mg Oral Early Morning    polyethylene glycol (MIRALAX) packet 17 g  17 g Oral Daily PRN    propranolol (INDERAL) tablet 10 mg  10 mg Oral Q12H KAYLIE    senna-docusate sodium (SENOKOT S) 8.6-50 mg per tablet 1 tablet  1 tablet Oral Daily PRN    senna-docusate sodium (SENOKOT S) 8.6-50 mg per tablet 2 tablet  2 tablet Oral BID    traZODone (DESYREL) tablet 50 mg  50 mg Oral HS PRN    white petrolatum-mineral oil (EUCERIN,HYDROCERIN) cream   Topical TID PRN   .    Risks, benefits and possible side effects of Medications:   Risks, benefits, and possible  side effects of medications explained to patient and patient verbalizes understanding.      Mental Status Evaluation:  Appearance:  age appropriate, bearded, casually dressed, and disheveled   Behavior:  psychomotor retardation, superficial   Speech:  soft and scant   Mood:  anxious and dysthymic   Affect:  blunted   Language sparse   Thought Process:  goal directed   Thought Content:  Negative thinking   Perceptual Disturbances: Auditory hallucinations without commands appearing internally preoccupied   Risk Potential: Suicidal Ideations none, Homicidal Ideations none, and Potential for Aggression No   Sensorium:  person, place, and time/date   Cognition:  recent and remote memory grossly intact   Consciousness:  alert and awake    Attention: attention span appeared shorter than expected for age   Insight:  limited   Judgment: limited   Intellect Not assessed   Gait/Station: normal gait/station and normal balance   Motor Activity: no abnormal movements     Memory: Short and long term memory  fair     Progress Toward Goals: unchanged, as evidenced by their participation (or lack thereof) in individual, social and therapeutic milieu in addition to adherence to their medication regimen.    Recommended Treatment:   See above for assessment and plan.    Inpatient Psychiatric Certification: Based upon physical, mental and social evaluations, I certify that inpatient psychiatric services are medically necessary for this patient for a duration of greater than 2 midnights for the treatment of Schizoaffective disorder, bipolar type (HCC) including psychotropic medication management, participation in the therapeutic milieu and referrals as indicated. Available alternative community resources do not meet the patient's mental health care needs. I further attest that an established written individualized plan of care has been implemented and is outlined in the patient's medical records.    Counseling/Coordination of  Care    Total unit time spent today was greater than 15 minutes. Greater than 50% of total time was spent with the patient and/or patient's relatives and/or coordination of patient's care.     Patient's rights, confidentiality, exceptions to confidentiality, use of electronic medical record including appropriate staff access to medical record regarding behavioral health services and consent to treatment were reviewed.    Note Share:     This note was not shared with the patient due to reasonable likelihood of causing patient harm     This note has been constructed using a voice recognition system.    There may be translation, syntax, or grammatical errors. If you have any questions, please contact the dictating provider.    Jordan Christopher Holter, DO 08/16/24

## 2024-08-17 PROCEDURE — 99232 SBSQ HOSP IP/OBS MODERATE 35: CPT

## 2024-08-17 RX ORDER — AMOXICILLIN 250 MG
2 CAPSULE ORAL
Status: DISCONTINUED | OUTPATIENT
Start: 2024-08-18 | End: 2024-11-03

## 2024-08-17 RX ADMIN — PROPRANOLOL HYDROCHLORIDE 10 MG: 10 TABLET ORAL at 21:12

## 2024-08-17 RX ADMIN — NICOTINE POLACRILEX 2 MG: 2 GUM, CHEWING ORAL at 14:09

## 2024-08-17 RX ADMIN — CLOZAPINE 200 MG: 200 TABLET ORAL at 17:45

## 2024-08-17 RX ADMIN — GLYCOPYRROLATE 1 MG: 1 TABLET ORAL at 08:33

## 2024-08-17 RX ADMIN — FAMOTIDINE 20 MG: 20 TABLET ORAL at 17:44

## 2024-08-17 RX ADMIN — LORATADINE 10 MG: 10 TABLET ORAL at 08:36

## 2024-08-17 RX ADMIN — CYANOCOBALAMIN TAB 1000 MCG 1000 MCG: 1000 TAB at 08:33

## 2024-08-17 RX ADMIN — ARIPIPRAZOLE 15 MG: 15 TABLET ORAL at 08:33

## 2024-08-17 RX ADMIN — METFORMIN HYDROCHLORIDE 500 MG: 500 TABLET, FILM COATED ORAL at 08:36

## 2024-08-17 RX ADMIN — PANTOPRAZOLE SODIUM 20 MG: 20 TABLET, DELAYED RELEASE ORAL at 06:29

## 2024-08-17 RX ADMIN — NICOTINE POLACRILEX 2 MG: 2 GUM, CHEWING ORAL at 08:38

## 2024-08-17 RX ADMIN — GLYCOPYRROLATE 1 MG: 1 TABLET ORAL at 17:45

## 2024-08-17 RX ADMIN — NICOTINE POLACRILEX 2 MG: 2 GUM, CHEWING ORAL at 18:13

## 2024-08-17 RX ADMIN — METFORMIN HYDROCHLORIDE 500 MG: 500 TABLET, FILM COATED ORAL at 17:44

## 2024-08-17 RX ADMIN — CLOZAPINE 300 MG: 200 TABLET ORAL at 21:12

## 2024-08-17 RX ADMIN — FAMOTIDINE 20 MG: 20 TABLET ORAL at 08:33

## 2024-08-17 RX ADMIN — PROPRANOLOL HYDROCHLORIDE 10 MG: 10 TABLET ORAL at 08:36

## 2024-08-17 RX ADMIN — ESCITALOPRAM OXALATE 20 MG: 10 TABLET, FILM COATED ORAL at 08:34

## 2024-08-17 RX ADMIN — SENNOSIDES AND DOCUSATE SODIUM 2 TABLET: 8.6; 5 TABLET ORAL at 17:44

## 2024-08-17 RX ADMIN — HYDROCHLOROTHIAZIDE 12.5 MG: 12.5 TABLET ORAL at 08:35

## 2024-08-17 RX ADMIN — SENNOSIDES AND DOCUSATE SODIUM 2 TABLET: 8.6; 5 TABLET ORAL at 08:37

## 2024-08-17 RX ADMIN — GLYCOPYRROLATE 1 MG: 1 TABLET ORAL at 21:12

## 2024-08-17 RX ADMIN — NICOTINE POLACRILEX 2 MG: 2 GUM, CHEWING ORAL at 21:11

## 2024-08-17 NOTE — NURSING NOTE
"Pt visible on unit with select peer. Given gum at 1715 & 2136. Compliant with meds and meal. Denies psychiatric symptoms. No behavior issues, cooperative  however quiet. \"I had ECT today and I have a hard time remembering\" Q7 min checks maintained.  "

## 2024-08-17 NOTE — NURSING NOTE
Patient slept throughout the shift. Difficult to wake, but compliant with AM medication. He gave this writer his Nicotine gum to dispose. No issues or behaviors. Continue to monitor. Precautions maintained.

## 2024-08-17 NOTE — NURSING NOTE
Alert, cooperative and visible intermittently. Consumed 25% of dinner. Took all medication without prompting. Maintained on safe precautions without incident.

## 2024-08-17 NOTE — NURSING NOTE
Pt stated he is going to the bathroom frequently and requesting his senna be decreased. [facility] medical provider made aware. N.O senna-docusate sodium 8.6-50mg  2 tablet two times a day.

## 2024-08-17 NOTE — PROGRESS NOTES
"Progress Note - Behavioral Health   Alberto Berumen 28 y.o. male MRN: [id number]  Unit/Bed#: [facility] 108-02 Encounter: [id number]    This note was not shared with the patient due to reasonable likelihood of causing patient harm    Patient was seen today for continuation of care, records reviewed and patient was discussed with the morning case review team. Per nursing report, patient woke up and ate breakfast this morning after he was encouraged by nursing staff. He continues to appear flat and states that he feels tired. Patient has been medication/meal compliant. He currently has a CRR referral placed. Patient continues to be appropriate with peers and he is social with select peers. Patient attended 2/7 groups yesterday. No acute events or major concerns in the past 24 hours.    Alberto was seen today for psychiatric follow-up. On assessment today, Alberto was approached while sitting in the common area. He states that his mood today is \"good\". Reports that sleep, appetite, and energy levels have all been good. He states that he went to group this morning and he ate all of his breakfast. Patient continues to appear with a guarded affect and he states that he hears auditory hallucinations but denies commands. Patient denies any major questions or concerns at this time.    Alberto denies acute suicidal/self-harm ideation/intent/plan upon direct inquiry at this time. Alberto remains behaviorally appropriate, no agitation or aggression noted on exam or in report. Alberto also denies HI/VH, and does not appear overtly manic. Patient reports experiencing AH but reports that they are without commands. Alberto appears internally preoccupied throughout encounter today. No overt delusions or paranoia are verbalized. Alberto remains adherent to his current psychotropic medication regimen and denies any side effects from medications.    Vitals:  Vitals:    08/18/24 0854   BP: 130/87   Pulse:    Resp:    Temp:    SpO2:  "       Laboratory Results:  I have personally reviewed all pertinent laboratory/tests results.    Psychiatric Review of Systems:  Behavior over the last 24 hours:  unchanged.   Sleep: Appropriate  Appetite: Appropriate  Medication side effects: None  ROS: no complaints    Mental Status Evaluation:  Appearance:  disheveled, age-appropriate, wearing his restrepo up   Behavior:  superficial   Speech:  scant, soft   Mood:  anxious, dysthymic   Affect:  blunted   Thought Process:  coherent, goal directed   Thought Content:  negative thinking   Perceptual Disturbances: Auditory hallucinations without commands, appears internally preoccupied   Risk Potential: Suicidal ideation - None at present  Homicidal ideation - None at present  Potential for aggression - Not at present   Memory:  recent and remote memory grossly intact   Sensorium  person, place, and time/date      Consciousness:  alert and awake   Attention: attention span and concentration appear shorter than expected for age   Insight:  limited   Judgment: limited   Gait/Station: normal gait/station, normal balance   Motor Activity: no abnormal movements     Progress Toward Goals:   Alberto is progressing towards goals of inpatient psychiatric treatment by continued medication compliance and is attending therapeutic modalities on the milieu. However, the patient continues to require inpatient psychiatric hospitalization for continued medication management and titration to optimize symptom reduction, improve sleep hygiene, and demonstrate adequate self-care.    Assessment & Plan   Principal Problem:    Schizoaffective disorder, bipolar type (HCC)  Active Problems:    GERD (gastroesophageal reflux disease)    Medical clearance for psychiatric admission    Tobacco abuse    T wave inversion in EKG    Chronic idiopathic constipation    Confluent and reticulate papillomatosis    Primary hypertension    Elevated hemoglobin A1c    Bilateral lower extremity  edema      Recommended Treatment: Treatment plan and medication changes discussed and per the attending physician the plan is:    1.Continue with group therapy, milieu therapy and occupational therapy  2.Behavioral Health checks every 7 minutes  3.Continue frequent safety checks and vitals per unit protocol  4.Continue with [facility] medical management as indicated  5.Continue with current medication regimen: Abilify 15 mg daily, clozapine 200 mg before dinner and clozapine 300 mg at bedtime, Lexapro 20 mg daily, propranolol 10 mg twice daily  6.Will continue to monitor labs   7.Disposition Planning: Discharge planning and efforts remain ongoing    Behavioral Health Medications: all current active meds have been reviewed and continue current psychiatric medications.  Current Facility-Administered Medications   Medication Dose Route Frequency Provider Last Rate    acetaminophen  650 mg Oral Q4H PRN Jordan C Holter, DO      acetaminophen  650 mg Oral Q6H PRN HOLLI Lion      aluminum-magnesium hydroxide-simethicone  30 mL Oral Q4H PRN Jordan C Holter, DO      amLODIPine  5 mg Oral Daily HOLLI Lion      ARIPiprazole  15 mg Oral Daily Bora Rosario MD      Artificial Tears  1 drop Both Eyes Q3H PRN Jordan C Holter, DO      atropine  1 drop Sublingual Daily PRN HOLLI Lion      haloperidol lactate  2.5 mg Intramuscular Q4H PRN Max 4/day HOLLI Lion      And    LORazepam  1 mg Intramuscular Q4H PRN Max 4/day HOLLI Lion      And    benztropine  0.5 mg Intramuscular Q4H PRN Max 4/day HOLLI Lion      benztropine  1 mg Intramuscular Q4H PRN Max 6/day Jordan C Holter, DO      haloperidol lactate  5 mg Intramuscular Q4H PRN Max 4/day HOLLI Lion      And    LORazepam  2 mg Intramuscular Q4H PRN Max 4/day HOLLI Lion      And    benztropine  1 mg Intramuscular Q4H PRN Max 4/day HOLLI Lion      benztropine  1 mg Oral Q4H PRN Max 6/day HOLLI Lion       benztropine  1 mg Oral Q4H PRN Max 6/day [facility] Holter, DO      bisacodyl  10 mg Rectal Daily PRN HOLLI Lion      calcium carbonate  500 mg Oral BID PRN HOLLI Monge      cloZAPine  200 mg Oral Before Dinner Bora Rosario MD      cloZAPine  300 mg Oral HS Bora Rosario MD      cyanocobalamin  1,000 mcg Oral Daily HOLLI Galvan      hydrOXYzine HCL  50 mg Oral Q6H PRN Max 4/day [facility] Holter, DO      Or    diphenhydrAMINE  50 mg Intramuscular Q6H PRN [facility] Holter, DO      hydrOXYzine HCL  50 mg Oral Q6H PRN Max 4/day HOLLI Lion      Or    diphenhydrAMINE  50 mg Intramuscular Q6H PRN HOLLI Lion      diphenhydrAMINE-zinc acetate   Topical BID PRN HOLLI Lion      escitalopram  20 mg Oral Daily HOLLI Lion      famotidine  20 mg Oral BID HOLLI Monge      fluticasone  1 spray Each Nare Daily Brina Guillen MD      glycopyrrolate  1 mg Oral TID Bora Rosario MD      haloperidol  1 mg Oral Q6H PRN HOLLI Lion      haloperidol  2.5 mg Oral Q4H PRN Max 4/day HOLLI Lion      haloperidol  5 mg Oral Q4H PRN Max 4/day HOLLI Lion      hydroCHLOROthiazide  12.5 mg Oral Daily HOLLI Galvan      hydrocortisone   Topical 4x Daily PRN HOLLI Lion      hydrOXYzine HCL  100 mg Oral Q6H PRN Max 4/day [facility] Holter, DO      Or    LORazepam  2 mg Intramuscular Q6H PRN [facility] Holter, DO      hydrOXYzine HCL  100 mg Oral Q6H PRN Max 4/day HOLLI Lion      Or    LORazepam  2 mg Intramuscular Q6H PRN HOLLI Lion      hydrOXYzine HCL  25 mg Oral Q6H PRN Max 4/day [facility] Holter, DO      ibuprofen  600 mg Oral Q8H PRN HOLLI Lion      lactated ringers  50 mL/hr Intravenous Continuous Antwan Dong MD      Lidocaine Viscous HCl  15 mL Swish & Spit 4x Daily PRN HOLLI Galvan      loratadine  10 mg Oral Daily Brina uGillen MD      melatonin  3 mg Oral HS PRN Bora Rosario MD       metFORMIN  500 mg Oral BID With Meals HOLLI Galvan      methocarbamol  500 mg Oral Q6H PRN HOLLI Lion      nicotine polacrilex  2 mg Oral Q4H PRN Bora Rosario MD      OLANZapine  5 mg Oral Q4H PRN Max 3/day [facility] Holter, DO      Or    OLANZapine  2.5 mg Intramuscular Q4H PRN Max 3/day [facility] Holter, DO      OLANZapine  5 mg Oral Q3H PRN Max 3/day [facility] Holter, DO      Or    OLANZapine  5 mg Intramuscular Q3H PRN Max 3/day [facility] Holter, DO      OLANZapine  2.5 mg Oral Q4H PRN Max 6/day [facility] Holter, DO      ondansetron  4 mg Oral Q6H PRN HOLLI Lion      pantoprazole  20 mg Oral Early Morning HOLLI Monge      polyethylene glycol  17 g Oral Daily PRN [facility] Holter, DO      propranolol  10 mg Oral Q12H KAYLIE HOLLI Lion      senna-docusate sodium  1 tablet Oral Daily PRN [facility] Holter, DO      senna-docusate sodium  2 tablet Oral HS Melvina Ambron, DO      traZODone  50 mg Oral HS PRN HOLLI Lion      white petrolatum-mineral oil   Topical TID PRN HOLLI Lion         Risks / Benefits of Treatment:  Risks, benefits, and possible side effects of medications explained to patient and patient verbalizes understanding and agreement for treatment.    Counseling / Coordination of Care:  Patient's progress reviewed with nursing staff.  Medications, treatment progress and treatment plan reviewed with patient.  Supportive counseling provided to the patient.    Total floor/unit time spent today 15 minutes. Greater than 50% of total time was spent with the patient and / or family counseling and / or coordination of care. A description of the counseling / coordination of care: medication education, treatment plan, supportive therapy.    Eveline Anglni PA-C  08/18/24

## 2024-08-17 NOTE — PLAN OF CARE
Problem: Alteration in Thoughts and Perception  Goal: Refrain from acting on delusional thinking/internal stimuli  Description: Interventions:  - Monitor patient closely, per order   - Utilize least restrictive measures   - Set reasonable limits, give positive feedback for acceptable   - Administer medications as ordered and monitor of potential side effects  Outcome: Progressing  Goal: Agree to be compliant with medication regime, as prescribed and report medication side effects  Description: Interventions:  - Offer appropriate PRN medication and supervise ingestion; conduct AIMS, as needed   Outcome: Progressing  Goal: Attend and participate in unit activities, including therapeutic, recreational, and educational groups  Description: Interventions:  -Encourage Visitation and family involvement in care  Outcome: Progressing  Goal: Recognize dysfunctional thoughts, communicate reality-based thoughts at the time of discharge  Description: Interventions:  - Provide medication and psycho-education to assist patient in compliance and developing insight into his/her illness   Outcome: Progressing  Goal: Complete daily ADLs, including personal hygiene independently, as able  Description: Interventions:  - Observe, teach, and assist patient with ADLS  - Monitor and promote a balance of rest/activity, with adequate nutrition and elimination   Outcome: Progressing     Problem: Ineffective Coping  Goal: Identifies ineffective coping skills  Outcome: Progressing  Goal: Identifies healthy coping skills  Outcome: Progressing  Goal: Demonstrates healthy coping skills  Outcome: Progressing     Problem: Depression  Goal: Refrain from harming self  Description: Interventions:  - Monitor patient closely, per order   - Supervise medication ingestion, monitor effects and side effects   Outcome: Progressing  Goal: Refrain from isolation  Description: Interventions:  - Develop a trusting relationship   - Encourage socialization    Outcome: Progressing     Problem: Anxiety  Goal: Anxiety is at manageable level  Description: Interventions:  - Assess and monitor patient's anxiety level.   - Monitor for signs and symptoms (heart palpitations, chest pain, shortness of breath, headaches, nausea, feeling jumpy, restlessness, irritable, apprehensive).   - Collaborate with interdisciplinary team and initiate plan and interventions as ordered.  - [place] patient to unit/surroundings  - Explain treatment plan  - Encourage participation in care  - Encourage verbalization of concerns/fears  - Identify coping mechanisms  - Assist in developing anxiety-reducing skills  - Administer/offer alternative therapies  - Limit or eliminate stimulants  Outcome: Progressing     Problem: Alteration in Orientation  Goal: Interact with staff daily  Description: Interventions:  - Assess and re-assess patient's level of orientation  - Engage patient in 1 on 1 interactions, daily, for a minimum of 15 minutes   - Establish rapport/trust with patient   Outcome: Progressing     Problem: Alteration in Thoughts and Perception  Goal: Treatment Goal: Gain control of psychotic behaviors/thinking, reduce/eliminate presenting symptoms and demonstrate improved reality functioning upon discharge  Outcome: Not Progressing  Goal: Verbalize thoughts and feelings  Description: Interventions:  - Promote a nonjudgmental and trusting relationship with the patient through active listening and therapeutic communication  - Assess patient's level of functioning, behavior and potential for risk  - Engage patient in 1 on 1 interactions  - Encourage patient to express fears, feelings, frustrations, and discuss symptoms    - [place] patient to reality, help patient recognize reality-based thinking   - Administer medications as ordered and assess for potential side effects  - Provide the patient education related to the signs and symptoms of the illness and desired effects of prescribed  medications  Outcome: Not Progressing     Problem: Depression  Goal: Treatment Goal: Demonstrate behavioral control of depressive symptoms, verbalize feelings of improved mood/affect, and adopt new coping skills prior to discharge  Outcome: Not Progressing

## 2024-08-17 NOTE — NURSING NOTE
Alert, cooperative and visible intermittently. Socializing with selective peers. No SI or HI noted. Denies depression, anxiety and pain. Pt continues with auditory hallucinations.  Did not attend any group. Consumed 100% of breakfast and refused lunch. Took all medication without prompting. Maintained on safe precautions without incident. Will continue to monitor progress and recovery program.

## 2024-08-18 VITALS
BODY MASS INDEX: 41.53 KG/M2 | SYSTOLIC BLOOD PRESSURE: 141 MMHG | OXYGEN SATURATION: 98 % | RESPIRATION RATE: 18 BRPM | DIASTOLIC BLOOD PRESSURE: 89 MMHG | TEMPERATURE: 97.5 F | WEIGHT: 258.4 LBS | HEIGHT: 66 IN | HEART RATE: 107 BPM

## 2024-08-18 PROCEDURE — 99232 SBSQ HOSP IP/OBS MODERATE 35: CPT

## 2024-08-18 RX ADMIN — FAMOTIDINE 20 MG: 20 TABLET ORAL at 08:40

## 2024-08-18 RX ADMIN — NICOTINE POLACRILEX 2 MG: 2 GUM, CHEWING ORAL at 21:13

## 2024-08-18 RX ADMIN — CYANOCOBALAMIN TAB 1000 MCG 1000 MCG: 1000 TAB at 08:39

## 2024-08-18 RX ADMIN — LORATADINE 10 MG: 10 TABLET ORAL at 08:40

## 2024-08-18 RX ADMIN — PROPRANOLOL HYDROCHLORIDE 10 MG: 10 TABLET ORAL at 21:13

## 2024-08-18 RX ADMIN — NICOTINE POLACRILEX 2 MG: 2 GUM, CHEWING ORAL at 08:39

## 2024-08-18 RX ADMIN — NICOTINE POLACRILEX 2 MG: 2 GUM, CHEWING ORAL at 17:16

## 2024-08-18 RX ADMIN — GLYCOPYRROLATE 1 MG: 1 TABLET ORAL at 21:12

## 2024-08-18 RX ADMIN — HYDROCHLOROTHIAZIDE 12.5 MG: 12.5 TABLET ORAL at 08:55

## 2024-08-18 RX ADMIN — PANTOPRAZOLE SODIUM 20 MG: 20 TABLET, DELAYED RELEASE ORAL at 06:27

## 2024-08-18 RX ADMIN — ARIPIPRAZOLE 15 MG: 15 TABLET ORAL at 08:40

## 2024-08-18 RX ADMIN — CLOZAPINE 200 MG: 200 TABLET ORAL at 17:16

## 2024-08-18 RX ADMIN — CLOZAPINE 300 MG: 200 TABLET ORAL at 21:13

## 2024-08-18 RX ADMIN — METFORMIN HYDROCHLORIDE 500 MG: 500 TABLET, FILM COATED ORAL at 08:39

## 2024-08-18 RX ADMIN — GLYCOPYRROLATE 1 MG: 1 TABLET ORAL at 17:16

## 2024-08-18 RX ADMIN — FAMOTIDINE 20 MG: 20 TABLET ORAL at 17:16

## 2024-08-18 RX ADMIN — ESCITALOPRAM OXALATE 20 MG: 10 TABLET, FILM COATED ORAL at 08:39

## 2024-08-18 RX ADMIN — METFORMIN HYDROCHLORIDE 500 MG: 500 TABLET, FILM COATED ORAL at 17:16

## 2024-08-18 RX ADMIN — AMLODIPINE BESYLATE 5 MG: 5 TABLET ORAL at 08:55

## 2024-08-18 RX ADMIN — GLYCOPYRROLATE 1 MG: 1 TABLET ORAL at 08:39

## 2024-08-18 RX ADMIN — SENNOSIDES AND DOCUSATE SODIUM 2 TABLET: 8.6; 5 TABLET ORAL at 21:12

## 2024-08-18 RX ADMIN — PROPRANOLOL HYDROCHLORIDE 10 MG: 10 TABLET ORAL at 08:55

## 2024-08-19 PROCEDURE — 99232 SBSQ HOSP IP/OBS MODERATE 35: CPT | Performed by: PSYCHIATRY & NEUROLOGY

## 2024-08-19 RX ADMIN — NICOTINE POLACRILEX 2 MG: 2 GUM, CHEWING ORAL at 17:10

## 2024-08-19 RX ADMIN — ESCITALOPRAM OXALATE 20 MG: 10 TABLET, FILM COATED ORAL at 09:07

## 2024-08-19 RX ADMIN — PANTOPRAZOLE SODIUM 20 MG: 20 TABLET, DELAYED RELEASE ORAL at 06:17

## 2024-08-19 RX ADMIN — METFORMIN HYDROCHLORIDE 500 MG: 500 TABLET, FILM COATED ORAL at 09:06

## 2024-08-19 RX ADMIN — NICOTINE POLACRILEX 2 MG: 2 GUM, CHEWING ORAL at 12:45

## 2024-08-19 RX ADMIN — CLOZAPINE 200 MG: 200 TABLET ORAL at 17:10

## 2024-08-19 RX ADMIN — GLYCOPYRROLATE 1 MG: 1 TABLET ORAL at 09:06

## 2024-08-19 RX ADMIN — CYANOCOBALAMIN TAB 1000 MCG 1000 MCG: 1000 TAB at 09:07

## 2024-08-19 RX ADMIN — HYDROCHLOROTHIAZIDE 12.5 MG: 12.5 TABLET ORAL at 09:06

## 2024-08-19 RX ADMIN — METFORMIN HYDROCHLORIDE 500 MG: 500 TABLET, FILM COATED ORAL at 17:10

## 2024-08-19 RX ADMIN — CLOZAPINE 300 MG: 200 TABLET ORAL at 21:16

## 2024-08-19 RX ADMIN — FAMOTIDINE 20 MG: 20 TABLET ORAL at 17:10

## 2024-08-19 RX ADMIN — GLYCOPYRROLATE 1 MG: 1 TABLET ORAL at 21:16

## 2024-08-19 RX ADMIN — FAMOTIDINE 20 MG: 20 TABLET ORAL at 09:07

## 2024-08-19 RX ADMIN — NICOTINE POLACRILEX 2 MG: 2 GUM, CHEWING ORAL at 09:07

## 2024-08-19 RX ADMIN — GLYCOPYRROLATE 1 MG: 1 TABLET ORAL at 17:10

## 2024-08-19 RX ADMIN — PROPRANOLOL HYDROCHLORIDE 10 MG: 10 TABLET ORAL at 09:10

## 2024-08-19 RX ADMIN — NICOTINE POLACRILEX 2 MG: 2 GUM, CHEWING ORAL at 21:16

## 2024-08-19 RX ADMIN — PROPRANOLOL HYDROCHLORIDE 10 MG: 10 TABLET ORAL at 21:15

## 2024-08-19 RX ADMIN — LORATADINE 10 MG: 10 TABLET ORAL at 09:05

## 2024-08-19 RX ADMIN — SENNOSIDES AND DOCUSATE SODIUM 2 TABLET: 8.6; 5 TABLET ORAL at 21:16

## 2024-08-19 RX ADMIN — FLUTICASONE PROPIONATE 1 SPRAY: 50 SPRAY, METERED NASAL at 09:10

## 2024-08-19 RX ADMIN — ARIPIPRAZOLE 15 MG: 15 TABLET ORAL at 09:06

## 2024-08-19 NOTE — NURSING NOTE
Patient is alert and oriented. He was cooperative with weekly assessment however refused weight. Patient states that he still has auditory hallucinations however are better since he had ect on Friday. Patient is visible and social with peers and staff. Behaviors appropriate. He attended groups. Appetite is good

## 2024-08-19 NOTE — PROGRESS NOTES
"   08/19/24 1327   Team Meeting   Meeting Type Tx Team Meeting   Initial Conference Date 08/19/24   Next Conference Date 09/18/24   Team Members Present   Team Members Present Physician;Nurse;;Other (Discipline and Name)   Physician Team Member Abraham   Nursing Team Member Claudia   Social Work Team Member Jenny ORTEGA   Other (Discipline and Name) Gaby FELIPEDS, Geronimo [facility], Yadira BARBER   Patient/Family Present   Patient Present Yes   Patient's Family Present No     Pt attended review of his updated treatment plan. Pt expressed no questions other than \"what am I signing it for?\". SW educated pt that he is signing to acknowledge that it's been reviewed and he does not have any questions or concerns about his goals he is working toward. Tx team signed plan; copy placed in pt's chart.   "

## 2024-08-19 NOTE — NURSING NOTE
Patient was witnessed given a female peer a brief hug back in the library area. Then he went to his room.

## 2024-08-19 NOTE — PROGRESS NOTES
Psychiatry Progress Note [facility]    Alberto Berumen 28 y.o. male MRN: [id number]  Unit/Bed#: [facility] 108-02 Encounter: [id number]  Code Status: Level 1 - Full Code    PCP: Joce Juan MD    Date of Admission:  3/29/2024 2008   Date of Service:  08/19/24    Patient Active Problem List   Diagnosis    GERD (gastroesophageal reflux disease)    Medical clearance for psychiatric admission    Schizoaffective disorder, bipolar type (HCC)    Tobacco abuse    T wave inversion in EKG    Syringoma    Chronic idiopathic constipation    Vitamin B 12 deficiency    Vitamin D deficiency    Confluent and reticulate papillomatosis    Class 2 obesity in adult    Primary hypertension    Elevated hemoglobin A1c    Bilateral lower extremity edema     Review of systems: Unremarkable   assessment  Overall Status: Still getting maintenance ECTs every 2 weeks and even for 2 more ECTs following which we will consider discharging him to his aunt with an ACT team in place.  Still with same threatening voices as before but not aggressive and no command voices and not expressing any suicidal threats or thoughts  certification Statement: The patient will continue to require additional inpatient hospital stay due to ongoing voices that are threatening to mixing lack of response to medications and ECT so far.     Medications: Clozapine 300 mg at bedtime and 200 mg at 4 PM, propranolol 10 mg every 12 hours as as needed for anxiety, Abilify 15 mg mg at bedtime Lexapro 20 mg once a day, atropine eyedrops sublingual for drooling of saliva and senna 2 tablets twice a day for constipation  All medications reviewed and recommend they be continued for symptom management   side effects from treatment: None reported  Medication changes   None today  Medication education   Risks side effects benefits and precautions of medications discussed with patient and he did verbalize an understanding about risks for metabolic syndrome from being  on neuroleptics and is form tardive dyskinesia etc. and special precautions about being on clozapine   Understanding of medications: Has some understanding   Justification for dual anti-psychotics: Not applicable    Non-pharmacological treatments  Continue with individual, group, milieu and occupational therapy using recovery principles and psycho-education about accepting illness and the need for treatment.   to contact aunt and explore ACT team referral which he never had  ECT to be continued  q 2 weekly for maintenance x 6  Refuses to see a dietician and agrees to do more exercises as he is about 100 lbs overweight   Prolactin level high so Risperdal replaced with Abilify which he has tolerated well so far    Safety  Safety and communication plan established to target dynamic risk factors discussed above.    Discharge Plan   Back to his aunt with an ACT team once ECT is completed but the aunt is now having second thoughts so he may need a referral to a CRR    Interval Progress   Continues to report same threatening voices that tell him that they are going to kill him and his family which he has been hearing for over 2 years now but continues to insist that ECTs have definitely helped in decreasing the voices and has allowed him to stay out of his head and he is accepting the maintenance ECTs every 2 weeks and is scheduled for 2 more sessions.  Able to interact with select peers and needs reminders to use appropriate boundaries with a certain female peer on the unit.  Has not been aggressive or agitated or threatening or self-abusive and compliant with medications.  No behavioral outbursts and no aggressive or agitated or self-abusive episodes with no need for behavioral PRNs, admitted in team having passive suicidal thoughts because of voices threatening him with no plan and able to CFS.    acceptance by patient: Accepting  Hopefulness in recovery: Living with his aunt in the [facility]  Involved in  reintegration process: Talking with aunt and his siblings in [place] who sometimes comes to visit with him on the unit   trusting in relationship with psychiatrist: Trusting  Sleep: Good  Appetite: Good  Compliance with Medications: Good  Group attendance: Attending some 4/9  Significant events and progress towards goals: Improving but still with same voices that are threatening    mental Status Exam  Appearance: age appropriate, improved grooming, looks older than stated age, overweight, with hair in dreadlocks casually dressed with a clean shaven face, fairly groomed with good eye contact  attended team meeting today   behavior: fair eye contact, friendly bt still preoccupied   speech: normal rate, normal volume, normal pitch  Mood: dysphoric, anxious, continues to report feeling good with the ECT   affect: constricted, inappropriate, mood-congruent less constricted with minimal mood reactivity   thought Process: organized, logical, coherent, goal directed, linear, decreased rate of thoughts, slowing of thoughts, negative thinking, impaired abstract reasoning, concrete  Thought Content: paranoid ideation, some paranoia, grandiose ideas, intrusive thoughts, preoccupied, chronic, continues to report paranoia about people threatening to kill him and his family because of the voices.  He believes ECT has helped so that he is not much in his head.  No other delusions elicited.  No current suicidal or homicidal thoughts and no plans verbalized but today reports having passive death wishes because of voices with no plans and able to CFS and it has not changed yet  .  No phobias obsessions compulsions or distorted body perceptions reported.  Preoccupied with wanting to get ECTs more often despite reminding him that it may impair his memory and finally he agreed to such as it is  every 2 weeks  Perceptual Disturbances: still with same threatening voices not commanding   Hx Risk Factors: chronic psychiatric problems,  chronic anxiety symptoms, chronic psychotic symptoms,    Sensorium: oriented x 3 spheres and situation   cognition: recent and remote memory grossly intact  Consciousness: alert and awake  Attention: attention and concentration span fair   Intellect: appears to be of average intelligence  Insight: intact  Judgement: intact  Motor Activity: no abnormal movements     Vitals  Temp:  [97.5 °F (36.4 °C)] 97.5 °F (36.4 °C)  HR:  [] 107  Resp:  [18] 18  BP: (111-141)/(71-89) 141/89  SpO2:  [98 %] 98 %  No intake or output data in the 24 hours ending 08/19/24 0507  Lab Results: All labs reviewed and prolactin level came down to normal on 8/1/2024, clozapine level 475 on 8/15/24  Current Facility-Administered Medications   Medication Dose Route Frequency Provider Last Rate    acetaminophen  650 mg Oral Q4H PRN Jordan C Holter,       acetaminophen  650 mg Oral Q6H PRN STEVE LionNP      aluminum-magnesium hydroxide-simethicone  30 mL Oral Q4H PRN Jordan C Holter,       amLODIPine  5 mg Oral Daily STEVE LionNP      ARIPiprazole  15 mg Oral Daily Bora Rosario MD      Artificial Tears  1 drop Both Eyes Q3H PRN Jordan C Holter, DO      atropine  1 drop Sublingual Daily PRN HOLLI Lion      haloperidol lactate  2.5 mg Intramuscular Q4H PRN Max 4/day STEVE LionNP      And    LORazepam  1 mg Intramuscular Q4H PRN Max 4/day HOLLI Lion      And    benztropine  0.5 mg Intramuscular Q4H PRN Max 4/day HOLLI Lion      benztropine  1 mg Intramuscular Q4H PRN Max 6/day Jordan C Holter, DO      haloperidol lactate  5 mg Intramuscular Q4H PRN Max 4/day HOLLI Lion      And    LORazepam  2 mg Intramuscular Q4H PRN Max 4/day HOLLI Lion      And    benztropine  1 mg Intramuscular Q4H PRN Max 4/day HOLLI Lion      benztropine  1 mg Oral Q4H PRN Max 6/day HOLLI Lion      benztropine  1 mg Oral Q4H PRN Max 6/day Jordan C Holter, DO      bisacodyl  10 mg Rectal Daily  PRN HOLLI Lion      calcium carbonate  500 mg Oral BID PRN HOLLI Monge      cloZAPine  200 mg Oral Before Dinner Bora Rosario MD      cloZAPine  300 mg Oral HS Bora Rosario MD      cyanocobalamin  1,000 mcg Oral Daily HOLLI Galvan      hydrOXYzine HCL  50 mg Oral Q6H PRN Max 4/day Jordan C Holter, DO      Or    diphenhydrAMINE  50 mg Intramuscular Q6H PRN Jordan C Holter, DO      hydrOXYzine HCL  50 mg Oral Q6H PRN Max 4/day HOLLI Lion      Or    diphenhydrAMINE  50 mg Intramuscular Q6H PRN HOLLI Lion      diphenhydrAMINE-zinc acetate   Topical BID PRN HOLLI Lion      escitalopram  20 mg Oral Daily HOLLI Lion      famotidine  20 mg Oral BID HOLLI Monge      fluticasone  1 spray Each Nare Daily Brina Guillen MD      glycopyrrolate  1 mg Oral TID Bora Rosario MD      haloperidol  1 mg Oral Q6H PRN HOLLI Lion      haloperidol  2.5 mg Oral Q4H PRN Max 4/day HOLLI Lion      haloperidol  5 mg Oral Q4H PRN Max 4/day HOLLI Lion      hydroCHLOROthiazide  12.5 mg Oral Daily HOLLI Galvan      hydrocortisone   Topical 4x Daily PRN HOLLI Lion      hydrOXYzine HCL  100 mg Oral Q6H PRN Max 4/day Jordan C Holter, DO      Or    LORazepam  2 mg Intramuscular Q6H PRN Jordan C Holter, DO      hydrOXYzine HCL  100 mg Oral Q6H PRN Max 4/day HOLLI Lion      Or    LORazepam  2 mg Intramuscular Q6H PRN HOLLI Lion      hydrOXYzine HCL  25 mg Oral Q6H PRN Max 4/day Ion FISCHER Holjorden, DO      ibuprofen  600 mg Oral Q8H PRN HOLLI Lion      lactated ringers  50 mL/hr Intravenous Continuous Antwan Dong MD      Lidocaine Viscous HCl  15 mL Swish & Spit 4x Daily PRN HOLLI Galvan      loratadine  10 mg Oral Daily Brina Guillen MD      melatonin  3 mg Oral HS PRN Bora Rosario MD      metFORMIN  500 mg Oral BID With Meals HOLLI Galvan      methocarbamol  500 mg Oral  Q6H PRN HOLLI Lion      nicotine polacrilex  2 mg Oral Q4H PRN Bora Rosario MD      OLANZapine  5 mg Oral Q4H PRN Max 3/day [facility] Holter, DO      Or    OLANZapine  2.5 mg Intramuscular Q4H PRN Max 3/day [facility] Holter, DO      OLANZapine  5 mg Oral Q3H PRN Max 3/day [facility] Holter, DO      Or    OLANZapine  5 mg Intramuscular Q3H PRN Max 3/day [facility] Holter, DO      OLANZapine  2.5 mg Oral Q4H PRN Max 6/day [facility] Holter, DO      ondansetron  4 mg Oral Q6H PRN HOLLI Lion      pantoprazole  20 mg Oral Early Morning Eileen Holliday HOLLI Jensen      polyethylene glycol  17 g Oral Daily PRN [facility] Holter, DO      propranolol  10 mg Oral Q12H KAYLIE HOLLI Lion      senna-docusate sodium  1 tablet Oral Daily PRN [facility] Holter, DO      senna-docusate sodium  2 tablet Oral HS Melvina Ambron, DO      traZODone  50 mg Oral HS PRN HOLLI Lion      white petrolatum-mineral oil   Topical TID PRN HOLLI Lion         Counseling / Coordination of Care: Total floor / unit time spent today 15 minutes. Greater than 50% of total time was spent with the patient and / or family counseling and / or somewhat receptive to supportive listening and teaching positive coping skills to deal with symptom mangement.     Patient's Rights, confidentiality and exceptions to confidentiality, use of automated medical record, Behavioral Health Services staff access to medical record, and consent to treatment reviewed.    This note has been dictated and hence there may be problems with punctuation, spelling and formatting and if anyone has any concerns please address them to Dr. Rosario   This note is not shared with patient due to potential for making patient's condition worse by knowing the content of the note.

## 2024-08-19 NOTE — PROGRESS NOTES
08/19/24 1325   Team Meeting   Meeting Type Tx Team Meeting   Initial Conference Date 08/19/24   Next Conference Date 09/03/24   Team Members Present   Team Members Present Physician;Nurse;;Other (Discipline and Name)   Physician Team Member Abraham   Nursing Team Member Claudia   Social Work Team Member Jenny ORTEGA   Other (Discipline and Name) Geronimo [facility], Yadira BARBER   Patient/Family Present   Patient Present Yes   Patient's Family Present No   OTHER   Team Meeting - Additional Comments Pt attended tx team meeting. Pt was groggy but read through completed self-assessment. Pt reported passive SI which was addressed by provider. Pt reports ongoing AH causing distress. Pt's behaviors of reportedly hugging a female peer on the unit were addressed; pt denies these behaviors. BRANDY reviewed pt's current difficulty with discharge placement due to family's change of mind and not wanting pt to return home. Gaby discussed [facility] program; Geronimo reported that pt cannot do that program and have ACT services. ACT would be needed to assist pt with managing medications. No other options are available for structured day programs at this time. Pt is on the waitlist for CRR.

## 2024-08-19 NOTE — PROGRESS NOTES
08/19/24 0744   Team Meeting   Meeting Type Daily Rounds   Team Members Present   Team Members Present Physician;Nurse;;Other (Discipline and Name)   Patient/Family Present   Patient Present No   Patient's Family Present No     In attendance:  Dr. Alex Thomas, MD Dr. Jordan Holter, DO Guy Taylor, RN  uLisana Alvarado, [facility]  Carla Lux, [facility]  Irais Mcdowell M.S.    Groups:  4/9    Pt had ECT Friday. Pt monitored during meals due to food sharing behaviors occurring with peers. Pt reminded about boundaries with female peer on the unit. No other bx issues noted.

## 2024-08-20 PROCEDURE — 99232 SBSQ HOSP IP/OBS MODERATE 35: CPT | Performed by: PSYCHIATRY & NEUROLOGY

## 2024-08-20 RX ADMIN — CLOZAPINE 200 MG: 200 TABLET ORAL at 16:52

## 2024-08-20 RX ADMIN — METFORMIN HYDROCHLORIDE 500 MG: 500 TABLET, FILM COATED ORAL at 09:18

## 2024-08-20 RX ADMIN — LORATADINE 10 MG: 10 TABLET ORAL at 09:18

## 2024-08-20 RX ADMIN — NICOTINE POLACRILEX 2 MG: 2 GUM, CHEWING ORAL at 09:19

## 2024-08-20 RX ADMIN — METFORMIN HYDROCHLORIDE 500 MG: 500 TABLET, FILM COATED ORAL at 16:52

## 2024-08-20 RX ADMIN — GLYCOPYRROLATE 1 MG: 1 TABLET ORAL at 16:53

## 2024-08-20 RX ADMIN — GLYCOPYRROLATE 1 MG: 1 TABLET ORAL at 09:19

## 2024-08-20 RX ADMIN — SENNOSIDES AND DOCUSATE SODIUM 2 TABLET: 8.6; 5 TABLET ORAL at 21:06

## 2024-08-20 RX ADMIN — GLYCOPYRROLATE 1 MG: 1 TABLET ORAL at 21:06

## 2024-08-20 RX ADMIN — CYANOCOBALAMIN TAB 1000 MCG 1000 MCG: 1000 TAB at 09:18

## 2024-08-20 RX ADMIN — CLOZAPINE 300 MG: 200 TABLET ORAL at 21:06

## 2024-08-20 RX ADMIN — ESCITALOPRAM OXALATE 20 MG: 10 TABLET, FILM COATED ORAL at 09:18

## 2024-08-20 RX ADMIN — NICOTINE POLACRILEX 2 MG: 2 GUM, CHEWING ORAL at 13:00

## 2024-08-20 RX ADMIN — FAMOTIDINE 20 MG: 20 TABLET ORAL at 17:03

## 2024-08-20 RX ADMIN — PROPRANOLOL HYDROCHLORIDE 10 MG: 10 TABLET ORAL at 21:06

## 2024-08-20 RX ADMIN — NICOTINE POLACRILEX 2 MG: 2 GUM, CHEWING ORAL at 17:03

## 2024-08-20 RX ADMIN — NICOTINE POLACRILEX 2 MG: 2 GUM, CHEWING ORAL at 21:07

## 2024-08-20 RX ADMIN — ARIPIPRAZOLE 15 MG: 15 TABLET ORAL at 09:17

## 2024-08-20 RX ADMIN — PANTOPRAZOLE SODIUM 20 MG: 20 TABLET, DELAYED RELEASE ORAL at 06:29

## 2024-08-20 RX ADMIN — FAMOTIDINE 20 MG: 20 TABLET ORAL at 09:19

## 2024-08-20 RX ADMIN — FLUTICASONE PROPIONATE 1 SPRAY: 50 SPRAY, METERED NASAL at 09:19

## 2024-08-20 NOTE — NURSING NOTE
Visible on unit most of the shift. Affect flat. Smiles when approached.Unkept appearance.  Offers no complaints but when he is asked about auditory hallucinations he admits having these telling him they are going to hurt him and his family.  Appetite good. Missed breakfast because he was sleeping. Lunch- 100%, Dinner- 75%. Group attendance today- 4/8. Interacts when he is called on. Social only with select female peer. S.C. Denies suicidal ideations. Polite and quiet. Soft spoken.

## 2024-08-20 NOTE — NURSING NOTE
"Pt is accepting of medications without incidence and meal compliant. Pt is visible pacing the unit with select male peers, minimally social and brightens on approach. Pt reports AH as \"the same\", but denies all other s/s. Pt is polite and pleasant in interactions and offers no new concerns.   "

## 2024-08-20 NOTE — PROGRESS NOTES
08/20/24 0734   Team Meeting   Meeting Type Daily Rounds   Team Members Present   Team Members Present Physician;Nurse;;Other (Discipline and Name)   Patient/Family Present   Patient Present No   Patient's Family Present No     In attendance:  Dr. Alex Thomas, MD Dr. Jordan Holter, DO Guy Taylor, RN  [facility]ana Alvarado, [facility]  Carla Lux, [facility]  Irais Mcdowell M.S.    Groups: 4/8    Pt quiet, flat, reports ongoing AH. Pt appropriate with female peer. Pt completes ECT in September. CRR referral pending.

## 2024-08-20 NOTE — PROGRESS NOTES
Psychiatry Progress Note [facility]    Alberto Berumen 28 y.o. male MRN: [id number]  Unit/Bed#: [facility] 108-02 Encounter: [id number]  Code Status: Level 1 - Full Code    PCP: Joce Juan MD    Date of Admission:  3/29/2024 2008   Date of Service:  08/20/24    Patient Active Problem List   Diagnosis    GERD (gastroesophageal reflux disease)    Medical clearance for psychiatric admission    Schizoaffective disorder, bipolar type (HCC)    Tobacco abuse    T wave inversion in EKG    Syringoma    Chronic idiopathic constipation    Vitamin B 12 deficiency    Vitamin D deficiency    Confluent and reticulate papillomatosis    Class 2 obesity in adult    Primary hypertension    Elevated hemoglobin A1c    Bilateral lower extremity edema     Review of systems: Unremarkable   assessment  Overall Status: Now his aunt does not want to take him back and therefore we will have to refer him to a group home and he is accepting it for the time being and he continues to hear the same threatening voices as before to kill him and his family.  He is waiting for maintenance ECTs to be completed by the middle of September 2024 every 2 weeks which he claims have been helpful.  Was counseled about not hugging a female peer on the unit and use of appropriate boundaries with females  certification Statement: The patient will continue to require additional inpatient hospital stay due to ongoing voices that are threatening to mixing lack of response to medications and ECT so far.     Medications: Clozapine 300 mg at bedtime and 200 mg at 4 PM, propranolol 10 mg every 12 hours as as needed for anxiety, Abilify 15 mg mg at bedtime Lexapro 20 mg once a day, atropine eyedrops sublingual for drooling of saliva and senna 2 tablets twice a day for constipation  All medications reviewed and recommend they be continued for symptom management   side effects from treatment: None reported  Medication changes   None today  Medication  education   Risks side effects benefits and precautions of medications discussed with patient and he did verbalize an understanding about risks for metabolic syndrome from being on neuroleptics and is form tardive dyskinesia etc. and special precautions about being on clozapine   Understanding of medications: Has some understanding   Justification for dual anti-psychotics: Not applicable    Non-pharmacological treatments  Continue with individual, group, milieu and occupational therapy using recovery principles and psycho-education about accepting illness and the need for treatment.   to contact aunt and explore ACT team referral which he never had  ECT to be continued  q 2 weekly for maintenance x 6  Refuses to see a dietician and agrees to do more exercises as he is about 100 lbs overweight   Prolactin level high so Risperdal replaced with Abilify which he has tolerated well so far    Safety  Safety and communication plan established to target dynamic risk factors discussed above.    Discharge Plan   Back to his aunt with an ACT team once ECT is completed but the aunt is now having second thoughts so he may need a referral to a CRR    Interval Progress   Now his aunt is reluctant to take him back and therefore we will refer him to a group home instead.  Continues to report hearing same threatening voices for the last 2 years that tell him that they are going to kill him and his family despite claiming that ECTs have definitely helped him decrease the voices and has allowed him to stay out of his head and is now scheduled for 2 more sessions.  He was counseled about appropriate boundaries with certain female peer on the unit and given a final warning that if any inappropriate hugging and touching is observed in the birth will be .  Has not been aggressive or agitated or threatening or self-abusive with no need for behavioral PRNs or need for security to be called on his behalf.  Continues to  report passive death wishes because of voices threatening him but able to contract for safety with no thoughts intent or plans to act on them.   acceptance by patient: Accepting  Hopefulness in recovery: Living with his aunt in the [facility] but she is reluctant to take him back and therefore will drop out to a group home instead  Involved in reintegration process: Talking with aunt and siblings in [place] who comes to visit with him on the unit sometimes  trusting in relationship with psychiatrist: Trusting  Sleep: Good  Appetite: Good  Compliance with Medications: Good  Group attendance: Attending some  Significant events and progress towards goals: Improving but still with same voices that are threatening    mental Status Exam  Appearance: age appropriate, improved grooming, looks older than stated age, overweight, with hair in dreadlocks casually dressed with a clean shaven face, fairly groomed with good eye contact found laying in bed after skipping breakfast this morning  behavior: With fair eye contact, friendly but still preoccupied  speech: normal rate, normal volume, normal pitch  Mood: dysphoric, anxious, continues to report feeling good with the ECT   affect: constricted, inappropriate, mood-congruent less constricted with minimal mood reactivity   thought Process: organized, logical, coherent, goal directed, linear, decreased rate of thoughts, slowing of thoughts, negative thinking, impaired abstract reasoning, concrete  Thought Content: paranoid ideation, some paranoia, grandiose ideas, intrusive thoughts, preoccupied, chronic, continues to report paranoia about people threatening to kill him and his family because of the voices.  He believes ECT has helped so that he is not much in his head.  No other delusions elicited.  No current suicidal or homicidal thoughts and no plans verbalized but today reports having passive death wishes because of voices with no plans and able to CFS and it has not  changed yet  .  No phobias obsessions compulsions or distorted body perceptions reported.  Preoccupied with wanting to get ECTs more often despite reminding him that it may impair his memory and finally he agreed to such as it is  every 2 weeks  Perceptual Disturbances: Still with same threatening voices not commanding  Hx Risk Factors: chronic psychiatric problems, chronic anxiety symptoms, chronic psychotic symptoms,    Sensorium: Oriented x 3 spheres and situation  cognition: recent and remote memory grossly intact  Consciousness: alert and awake  Attention: Attention and concentration span fair  Intellect: appears to be of average intelligence  Insight: intact  Judgement: intact  Motor Activity: no abnormal movements     Vitals  Temp:  [97.5 °F (36.4 °C)-97.6 °F (36.4 °C)] 97.6 °F (36.4 °C)  HR:  [] 117  Resp:  [18] 18  BP: (109-133)/(59-88) 133/88  SpO2:  [98 %] 98 %    Intake/Output Summary (Last 24 hours) at 8/20/2024 0522  Last data filed at 8/19/2024 1210  Gross per 24 hour   Intake 0 ml   Output --   Net 0 ml     Lab Results: All labs reviewed and prolactin level came down to normal on 8/1/2024, clozapine level 475 on 8/15/24  Current Facility-Administered Medications   Medication Dose Route Frequency Provider Last Rate    acetaminophen  650 mg Oral Q4H PRN Jordan C Holter, DO      acetaminophen  650 mg Oral Q6H PRN HOLLI Lion      aluminum-magnesium hydroxide-simethicone  30 mL Oral Q4H PRN Jordan C Holter, DO      amLODIPine  5 mg Oral Daily HOLLI Lion      ARIPiprazole  15 mg Oral Daily Bora Rosario MD      Artificial Tears  1 drop Both Eyes Q3H PRN Jordan C Holter, DO      atropine  1 drop Sublingual Daily PRN HOLLI Lion      haloperidol lactate  2.5 mg Intramuscular Q4H PRN Max 4/day HOLLI Lion      And    LORazepam  1 mg Intramuscular Q4H PRN Max 4/day HOLLI Lion      And    benztropine  0.5 mg Intramuscular Q4H PRN Max 4/day HOLLI Lion       benztropine  1 mg Intramuscular Q4H PRN Max 6/day Ion FISCHER Holter, DO      haloperidol lactate  5 mg Intramuscular Q4H PRN Max 4/day HOLLI Lion      And    LORazepam  2 mg Intramuscular Q4H PRN Max 4/day HOLLI Lion      And    benztropine  1 mg Intramuscular Q4H PRN Max 4/day HOLLI Lion      benztropine  1 mg Oral Q4H PRN Max 6/day HOLLI Lion      benztropine  1 mg Oral Q4H PRN Max 6/day Ion FISCHER Holter, DO      bisacodyl  10 mg Rectal Daily PRN HOLLI Lion      calcium carbonate  500 mg Oral BID PRN HOLLI Monge      cloZAPine  200 mg Oral Before Dinner Bora Rosario MD      cloZAPine  300 mg Oral HS Bora Rosario MD      cyanocobalamin  1,000 mcg Oral Daily Pia Mantilla, HOLLI      hydrOXYzine HCL  50 mg Oral Q6H PRN Max 4/day Jordan C Holter, DO      Or    diphenhydrAMINE  50 mg Intramuscular Q6H PRN Jordan C Holter, DO      hydrOXYzine HCL  50 mg Oral Q6H PRN Max 4/day HOLLI Lion      Or    diphenhydrAMINE  50 mg Intramuscular Q6H PRN HOLLI Lion      diphenhydrAMINE-zinc acetate   Topical BID PRN HOLLI Lion      escitalopram  20 mg Oral Daily HOLLI Lion      famotidine  20 mg Oral BID HOLLI Monge      fluticasone  1 spray Each Nare Daily Brina Guillen MD      glycopyrrolate  1 mg Oral TID Bora Rosario MD      haloperidol  1 mg Oral Q6H PRN HOLLI Lion      haloperidol  2.5 mg Oral Q4H PRN Max 4/day HOLLI Lion      haloperidol  5 mg Oral Q4H PRN Max 4/day HOLLI Lion      hydroCHLOROthiazide  12.5 mg Oral Daily Pia Mantilla, HOLLI      hydrocortisone   Topical 4x Daily PRN HOLLI Lion      hydrOXYzine HCL  100 mg Oral Q6H PRN Max 4/day Ion Dupontter, DO      Or    LORazepam  2 mg Intramuscular Q6H PRN Ion C Holter, DO      hydrOXYzine HCL  100 mg Oral Q6H PRN Max 4/day HOLLI Lion      Or    LORazepam  2 mg Intramuscular Q6H PRN HOLLI Lion       hydrOXYzine HCL  25 mg Oral Q6H PRN Max 4/day [facility] Holter, DO      ibuprofen  600 mg Oral Q8H PRN HOLLI Lion      lactated ringers  50 mL/hr Intravenous Continuous Antwan Dong MD      Lidocaine Viscous HCl  15 mL Swish & Spit 4x Daily PRN HOLLI Galvan      loratadine  10 mg Oral Daily Brina Guillen MD      melatonin  3 mg Oral HS PRN Bora Rosario MD      metFORMIN  500 mg Oral BID With Meals HOLLI Galvan      methocarbamol  500 mg Oral Q6H PRN HOLLI Lion      nicotine polacrilex  2 mg Oral Q4H PRN Bora Rosario MD      OLANZapine  5 mg Oral Q4H PRN Max 3/day [facility] Holter, DO      Or    OLANZapine  2.5 mg Intramuscular Q4H PRN Max 3/day [facility] Holter, DO      OLANZapine  5 mg Oral Q3H PRN Max 3/day [facility] Holter, DO      Or    OLANZapine  5 mg Intramuscular Q3H PRN Max 3/day [facility] Holter, DO      OLANZapine  2.5 mg Oral Q4H PRN Max 6/day [facility] Holter, DO      ondansetron  4 mg Oral Q6H PRN HOLLI Lion      pantoprazole  20 mg Oral Early Morning HOLLI Monge      polyethylene glycol  17 g Oral Daily PRN [facility] Holter, DO      propranolol  10 mg Oral Q12H KAYLIE HOLLI Lion      senna-docusate sodium  1 tablet Oral Daily PRN [facility] Holter, DO      senna-docusate sodium  2 tablet Oral HS Melvina Choudhuryron, DO      traZODone  50 mg Oral HS PRN HOLLI Lion      white petrolatum-mineral oil   Topical TID PRN HOLLI Lion         Counseling / Coordination of Care: Total floor / unit time spent today 15 minutes. Greater than 50% of total time was spent with the patient and / or family counseling and / or somewhat receptive to supportive listening and teaching positive coping skills to deal with symptom mangement.     Patient's Rights, confidentiality and exceptions to confidentiality, use of automated medical record, Behavioral Health Services staff access to medical record, and consent to treatment reviewed.    This  note has been dictated and hence there may be problems with punctuation, spelling and formatting and if anyone has any concerns please address them to Dr. Rosario   This note is not shared with patient due to potential for making patient's condition worse by knowing the content of the note.

## 2024-08-20 NOTE — PLAN OF CARE
Problem: Alteration in Thoughts and Perception  Goal: Refrain from acting on delusional thinking/internal stimuli  Description: Interventions:  - Monitor patient closely, per order   - Utilize least restrictive measures   - Set reasonable limits, give positive feedback for acceptable   - Administer medications as ordered and monitor of potential side effects  Outcome: Progressing  Goal: Agree to be compliant with medication regime, as prescribed and report medication side effects  Description: Interventions:  - Offer appropriate PRN medication and supervise ingestion; conduct AIMS, as needed   Outcome: Progressing  Goal: Attend and participate in unit activities, including therapeutic, recreational, and educational groups  Description: Interventions:  -Encourage Visitation and family involvement in care  Outcome: Progressing  Goal: Recognize dysfunctional thoughts, communicate reality-based thoughts at the time of discharge  Description: Interventions:  - Provide medication and psycho-education to assist patient in compliance and developing insight into his/her illness   Outcome: Progressing  Goal: Complete daily ADLs, including personal hygiene independently, as able  Description: Interventions:  - Observe, teach, and assist patient with ADLS  - Monitor and promote a balance of rest/activity, with adequate nutrition and elimination   Outcome: Progressing     Problem: Ineffective Coping  Goal: Identifies ineffective coping skills  Outcome: Progressing  Goal: Identifies healthy coping skills  Outcome: Progressing  Goal: Demonstrates healthy coping skills  Outcome: Progressing  Goal: Participates in unit activities  Description: Interventions:  - Provide therapeutic environment   - Provide required programming   - Redirect inappropriate behaviors   Outcome: Progressing     Problem: Depression  Goal: Refrain from harming self  Description: Interventions:  - Monitor patient closely, per order   - Supervise medication  ingestion, monitor effects and side effects   Outcome: Progressing  Goal: Refrain from isolation  Description: Interventions:  - Develop a trusting relationship   - Encourage socialization   Outcome: Progressing  Goal: Refrain from self-neglect  Outcome: Progressing  Goal: Complete daily ADLs, including personal hygiene independently, as able  Description: Interventions:  - Observe, teach, and assist patient with ADLS  -  Monitor and promote a balance of rest/activity, with adequate nutrition and elimination   Outcome: Progressing     Problem: Anxiety  Goal: Anxiety is at manageable level  Description: Interventions:  - Assess and monitor patient's anxiety level.   - Monitor for signs and symptoms (heart palpitations, chest pain, shortness of breath, headaches, nausea, feeling jumpy, restlessness, irritable, apprehensive).   - Collaborate with interdisciplinary team and initiate plan and interventions as ordered.  - [place] patient to unit/surroundings  - Explain treatment plan  - Encourage participation in care  - Encourage verbalization of concerns/fears  - Identify coping mechanisms  - Assist in developing anxiety-reducing skills  - Administer/offer alternative therapies  - Limit or eliminate stimulants  Outcome: Progressing     Problem: Alteration in Thoughts and Perception  Goal: Treatment Goal: Gain control of psychotic behaviors/thinking, reduce/eliminate presenting symptoms and demonstrate improved reality functioning upon discharge  Outcome: Not Progressing  Goal: Verbalize thoughts and feelings  Description: Interventions:  - Promote a nonjudgmental and trusting relationship with the patient through active listening and therapeutic communication  - Assess patient's level of functioning, behavior and potential for risk  - Engage patient in 1 on 1 interactions  - Encourage patient to express fears, feelings, frustrations, and discuss symptoms    - [place] patient to reality, help patient recognize  reality-based thinking   - Administer medications as ordered and assess for potential side effects  - Provide the patient education related to the signs and symptoms of the illness and desired effects of prescribed medications  Outcome: Not Progressing     Problem: Depression  Goal: Treatment Goal: Demonstrate behavioral control of depressive symptoms, verbalize feelings of improved mood/affect, and adopt new coping skills prior to discharge  Outcome: Not Progressing

## 2024-08-21 PROCEDURE — 99232 SBSQ HOSP IP/OBS MODERATE 35: CPT | Performed by: PSYCHIATRY & NEUROLOGY

## 2024-08-21 RX ADMIN — ESCITALOPRAM OXALATE 20 MG: 10 TABLET, FILM COATED ORAL at 08:43

## 2024-08-21 RX ADMIN — NICOTINE POLACRILEX 2 MG: 2 GUM, CHEWING ORAL at 17:19

## 2024-08-21 RX ADMIN — LORATADINE 10 MG: 10 TABLET ORAL at 08:43

## 2024-08-21 RX ADMIN — ARIPIPRAZOLE 15 MG: 15 TABLET ORAL at 08:43

## 2024-08-21 RX ADMIN — PROPRANOLOL HYDROCHLORIDE 10 MG: 10 TABLET ORAL at 09:35

## 2024-08-21 RX ADMIN — NICOTINE POLACRILEX 2 MG: 2 GUM, CHEWING ORAL at 21:11

## 2024-08-21 RX ADMIN — CLOZAPINE 300 MG: 200 TABLET ORAL at 21:05

## 2024-08-21 RX ADMIN — GLYCOPYRROLATE 1 MG: 1 TABLET ORAL at 08:43

## 2024-08-21 RX ADMIN — PROPRANOLOL HYDROCHLORIDE 10 MG: 10 TABLET ORAL at 21:05

## 2024-08-21 RX ADMIN — GLYCOPYRROLATE 1 MG: 1 TABLET ORAL at 21:05

## 2024-08-21 RX ADMIN — NICOTINE POLACRILEX 2 MG: 2 GUM, CHEWING ORAL at 08:44

## 2024-08-21 RX ADMIN — AMLODIPINE BESYLATE 5 MG: 5 TABLET ORAL at 09:35

## 2024-08-21 RX ADMIN — FLUTICASONE PROPIONATE 1 SPRAY: 50 SPRAY, METERED NASAL at 08:44

## 2024-08-21 RX ADMIN — NICOTINE POLACRILEX 2 MG: 2 GUM, CHEWING ORAL at 12:49

## 2024-08-21 RX ADMIN — METFORMIN HYDROCHLORIDE 500 MG: 500 TABLET, FILM COATED ORAL at 08:43

## 2024-08-21 RX ADMIN — FAMOTIDINE 20 MG: 20 TABLET ORAL at 17:15

## 2024-08-21 RX ADMIN — METFORMIN HYDROCHLORIDE 500 MG: 500 TABLET, FILM COATED ORAL at 17:15

## 2024-08-21 RX ADMIN — FAMOTIDINE 20 MG: 20 TABLET ORAL at 08:43

## 2024-08-21 RX ADMIN — CYANOCOBALAMIN TAB 1000 MCG 1000 MCG: 1000 TAB at 08:43

## 2024-08-21 RX ADMIN — SENNOSIDES AND DOCUSATE SODIUM 2 TABLET: 8.6; 5 TABLET ORAL at 21:05

## 2024-08-21 RX ADMIN — HYDROCHLOROTHIAZIDE 12.5 MG: 12.5 TABLET ORAL at 09:35

## 2024-08-21 RX ADMIN — PANTOPRAZOLE SODIUM 20 MG: 20 TABLET, DELAYED RELEASE ORAL at 06:31

## 2024-08-21 RX ADMIN — GLYCOPYRROLATE 1 MG: 1 TABLET ORAL at 17:15

## 2024-08-21 RX ADMIN — CLOZAPINE 200 MG: 200 TABLET ORAL at 17:15

## 2024-08-21 NOTE — SOCIAL WORK
"SW checked in with pt. Pt reports feeling \"the same\". Pt remains \"on the fence\" about housing options and hopes that his family will change their mind and allow him to live with them eventually. Pt states he does not feel ready for discharge. SW assisted with processing and identifying pt's learned skills and strengths.   "

## 2024-08-21 NOTE — NURSING NOTE
"Alberto is calm with a blunted and depressed affect reporting mild level of depression and auditory hallucinations of multiple voices, sometimes simultaneously, telling him they are going to kill him and his family. He says he \"feels paranoid\" and is \"afraid to leave the hospital.\" Patient says \"I'm not ready to go yet.\" Denies visual hallucinations or anxiety. Visible during the day attending groups. Denies SI/HI. No unmet needs currently.   "

## 2024-08-21 NOTE — PLAN OF CARE
Problem: Alteration in Thoughts and Perception  Goal: Verbalize thoughts and feelings  Description: Interventions:  - Promote a nonjudgmental and trusting relationship with the patient through active listening and therapeutic communication  - Assess patient's level of functioning, behavior and potential for risk  - Engage patient in 1 on 1 interactions  - Encourage patient to express fears, feelings, frustrations, and discuss symptoms    - [place] patient to reality, help patient recognize reality-based thinking   - Administer medications as ordered and assess for potential side effects  - Provide the patient education related to the signs and symptoms of the illness and desired effects of prescribed medications  Outcome: Progressing  Goal: Refrain from acting on delusional thinking/internal stimuli  Description: Interventions:  - Monitor patient closely, per order   - Utilize least restrictive measures   - Set reasonable limits, give positive feedback for acceptable   - Administer medications as ordered and monitor of potential side effects  Outcome: Progressing  Goal: Agree to be compliant with medication regime, as prescribed and report medication side effects  Description: Interventions:  - Offer appropriate PRN medication and supervise ingestion; conduct AIMS, as needed   Outcome: Progressing  Goal: Attend and participate in unit activities, including therapeutic, recreational, and educational groups  Description: Interventions:  -Encourage Visitation and family involvement in care  Outcome: Progressing  Goal: Recognize dysfunctional thoughts, communicate reality-based thoughts at the time of discharge  Description: Interventions:  - Provide medication and psycho-education to assist patient in compliance and developing insight into his/her illness   Outcome: Progressing  Goal: Complete daily ADLs, including personal hygiene independently, as able  Description: Interventions:  - Observe, teach, and assist  patient with ADLS  - Monitor and promote a balance of rest/activity, with adequate nutrition and elimination   Outcome: Progressing     Problem: Ineffective Coping  Goal: Identifies ineffective coping skills  Outcome: Progressing  Goal: Identifies healthy coping skills  Outcome: Progressing  Goal: Demonstrates healthy coping skills  Outcome: Progressing  Goal: Participates in unit activities  Description: Interventions:  - Provide therapeutic environment   - Provide required programming   - Redirect inappropriate behaviors   Outcome: Progressing     Problem: Depression  Goal: Refrain from harming self  Description: Interventions:  - Monitor patient closely, per order   - Supervise medication ingestion, monitor effects and side effects   Outcome: Progressing  Goal: Refrain from isolation  Description: Interventions:  - Develop a trusting relationship   - Encourage socialization   Outcome: Progressing     Problem: Anxiety  Goal: Anxiety is at manageable level  Description: Interventions:  - Assess and monitor patient's anxiety level.   - Monitor for signs and symptoms (heart palpitations, chest pain, shortness of breath, headaches, nausea, feeling jumpy, restlessness, irritable, apprehensive).   - Collaborate with interdisciplinary team and initiate plan and interventions as ordered.  - [place] patient to unit/surroundings  - Explain treatment plan  - Encourage participation in care  - Encourage verbalization of concerns/fears  - Identify coping mechanisms  - Assist in developing anxiety-reducing skills  - Administer/offer alternative therapies  - Limit or eliminate stimulants  Outcome: Progressing     Problem: Alteration in Thoughts and Perception  Goal: Treatment Goal: Gain control of psychotic behaviors/thinking, reduce/eliminate presenting symptoms and demonstrate improved reality functioning upon discharge  Outcome: Not Progressing     Problem: Depression  Goal: Treatment Goal: Demonstrate behavioral control of  depressive symptoms, verbalize feelings of improved mood/affect, and adopt new coping skills prior to discharge  Outcome: Not Progressing

## 2024-08-21 NOTE — PROGRESS NOTES
08/21/24 0722   Team Meeting   Meeting Type Daily Rounds   Team Members Present   Team Members Present Physician;Nurse;;Other (Discipline and Name)   Patient/Family Present   Patient Present No   Patient's Family Present No     In attendance:  Dr. Alex Thomas, MD Dr. Jordan Holter, DO Guy Taylor, RN  Luisana Alvarado, [facility]  Carla Lux, [facility]  Irais Mcdowell M.S.    Groups: 8/10    Pt reportedly no longer dating female peer on the unit. Pt presents with flat affect, quiet. Pt reports not feeling ready for discharge due to distress over ongoing AH.

## 2024-08-21 NOTE — PROGRESS NOTES
Psychiatry Progress Note [facility]    Alberto Berumen 28 y.o. male MRN: [id number]  Unit/Bed#: [facility] 108-02 Encounter: [id number]  Code Status: Level 1 - Full Code    PCP: Joce Juan MD    Date of Admission:  3/29/2024 2008   Date of Service:  08/21/24    Patient Active Problem List   Diagnosis    GERD (gastroesophageal reflux disease)    Medical clearance for psychiatric admission    Schizoaffective disorder, bipolar type (HCC)    Tobacco abuse    T wave inversion in EKG    Syringoma    Chronic idiopathic constipation    Vitamin B 12 deficiency    Vitamin D deficiency    Confluent and reticulate papillomatosis    Class 2 obesity in adult    Primary hypertension    Elevated hemoglobin A1c    Bilateral lower extremity edema     Review of systems: Unremarkable   assessment  Overall Status: Accepting the fact that he cannot return to his own and needs to go into a group home instead.  Still with same threatening voices that tell him that he and his family are going to be killed and he claims they are much better and less often since ECTs were started.  Still somewhat isolated except interacting with select female peer with no inappropriate hugging or closing of boundaries reported with the peer recently since counseled   certification Statement: The patient will continue to require additional inpatient hospital stay due to ongoing voices that are threatening to mixing lack of response to medications and ECT so far.     Medications: Clozapine 300 mg at bedtime and 200 mg at 4 PM, propranolol 10 mg every 12 hours as as needed for anxiety, Abilify 15 mg mg at bedtime Lexapro 20 mg once a day, atropine eyedrops sublingual for drooling of saliva and senna 2 tablets twice a day for constipation  All medications reviewed and recommend they be continued for symptom management   side effects from treatment: None reported  Medication changes   None today  Medication education   Risks side effects  benefits and precautions of medications discussed with patient and he did verbalize an understanding about risks for metabolic syndrome from being on neuroleptics and is form tardive dyskinesia etc. and special precautions about being on clozapine   Understanding of medications: Has some understanding   Justification for dual anti-psychotics: Not applicable    Non-pharmacological treatments  Continue with individual, group, milieu and occupational therapy using recovery principles and psycho-education about accepting illness and the need for treatment.   to contact aunt and explore ACT team referral which he never had  ECT to be continued  q 2 weekly for maintenance x 6  Refuses to see a dietician and agrees to do more exercises as he is about 100 lbs overweight   Prolactin level high so Risperdal replaced with Abilify which he has tolerated well so far    Safety  Safety and communication plan established to target dynamic risk factors discussed above.    Discharge Plan   Back to his aunt with an ACT team once ECT is completed but the aunt is now having second thoughts so he may need a referral to a CRR    Interval Progress   Accepting the fact that he cannot return to his aunt and needs to go to a group home instead.  Still complaining of same threatening voices which he has been hearing for more than 2 years that threaten to kill him and his family that makes him sad and get death wishes but able to contract for safety.  No further incidents of inappropriate behaviors with female peer he selectively interacts.  No episodes of aggression or agitation or threatening or self-abusive behaviors with no need for behavioral PRNs and no need for security to be called on his behalf lately.  Able to contract for safety despite hearing voices that makes him feel sad and having passive death wishes on account of the voices not going away. Reportedly broke up with the female peer and was withdrawn in am      acceptance by patient: Accepting [facility] but  Hopefulness in recovery: She is having second thoughts about taking him back   Involved in reintegration process: Talking and meeting with aunt and siblings from [place] to come and visit with him occasionally on the unit  trusting in relationship with psychiatrist: Trusting  Sleep: Good  Appetite: Good  Compliance with Medications: Good  Group attendance: Attending some 8/10  Significant events and progress towards goals: Improving but still with same voices that are threatening    mental Status Exam  Appearance: age appropriate, improved grooming, looks older than stated age, overweight, with hair in dreadlocks casually dressed with a clean shaven face, fairly groomed with good eye contact found walking the hallway casually dressed fairly groomed  behavior: Eye contact is good today friendly but still preoccupied mildly anxious  speech: normal rate, normal volume, normal pitch  Mood: dysphoric, anxious, continues to report feeling good with the ECT   affect: constricted, inappropriate, mood-congruent still somewhat constricted with minimal mood reactivity thought Process: organized, logical, coherent, goal directed, linear, decreased rate of thoughts, slowing of thoughts, negative thinking, impaired abstract reasoning, concrete  Thought Content: paranoid ideation, some paranoia, grandiose ideas, intrusive thoughts, preoccupied, chronic, continues to report paranoia about people threatening to kill him and his family because of the voices.  He believes ECT has helped so that he is not much in his head.  No other delusions elicited.  No current suicidal or homicidal thoughts and no plans verbalized but today reports having passive death wishes because of voices with no plans and able to CFS and it has not changed yet  .  No phobias obsessions compulsions or distorted body perceptions reported.  Preoccupied with wanting to get ECTs more  often despite reminding him that it may impair his memory and finally he agreed to such as it is  every 2 weeks  Perceptual Disturbances: Continues to have same threatening voices to kill him and his family but they are not commanding   Hx Risk Factors: chronic psychiatric problems, chronic anxiety symptoms, chronic psychotic symptoms,    Sensorium: Oriented x 3 spheres and situation   cognition: recent and remote memory grossly intact  Consciousness: alert and awake  Attention: Attention and concentration span are intact  Intellect: appears to be of average intelligence  Insight: intact  Judgement: intact  Motor Activity: no abnormal movements     Vitals  Temp:  [97.6 °F (36.4 °C)-97.7 °F (36.5 °C)] 97.7 °F (36.5 °C)  HR:  [] 104  Resp:  [16] 16  BP: (107-132)/(76-83) 126/82  SpO2:  [98 %] 98 %  No intake or output data in the 24 hours ending 08/21/24 1055    Lab Results: All labs reviewed and prolactin level came down to normal on 8/1/2024, clozapine level 475 on 8/15/24  Current Facility-Administered Medications   Medication Dose Route Frequency Provider Last Rate    acetaminophen  650 mg Oral Q4H PRN Jordan C Holter, DO      acetaminophen  650 mg Oral Q6H PRN HOLLI Lion      aluminum-magnesium hydroxide-simethicone  30 mL Oral Q4H PRN Jordan C Holter, DO      amLODIPine  5 mg Oral Daily HOLLI Lion      ARIPiprazole  15 mg Oral Daily Bora Rosario MD      Artificial Tears  1 drop Both Eyes Q3H PRN Jordan C Holter, DO      atropine  1 drop Sublingual Daily PRN HOLLI Lion      haloperidol lactate  2.5 mg Intramuscular Q4H PRN Max 4/day HOLLI Lion      And    LORazepam  1 mg Intramuscular Q4H PRN Max 4/day HOLLI Lion      And    benztropine  0.5 mg Intramuscular Q4H PRN Max 4/day HOLLI Lion      benztropine  1 mg Intramuscular Q4H PRN Max 6/day Jordan C Holter,       haloperidol lactate  5 mg Intramuscular Q4H PRN Max 4/day HOLLI Lion      And     LORazepam  2 mg Intramuscular Q4H PRN Max 4/day HOLLI Lion      And    benztropine  1 mg Intramuscular Q4H PRN Max 4/day HOLLI Lion      benztropine  1 mg Oral Q4H PRN Max 6/day HOLLI Lion      benztropine  1 mg Oral Q4H PRN Max 6/day Ion Dupontter, DO      bisacodyl  10 mg Rectal Daily PRN HOLLI Lion      calcium carbonate  500 mg Oral BID PRN HOLLI Monge      cloZAPine  200 mg Oral Before Dinner Bora Rosario MD      cloZAPine  300 mg Oral HS Bora Rosario MD      cyanocobalamin  1,000 mcg Oral Daily HOLLI Galvan      hydrOXYzine HCL  50 mg Oral Q6H PRN Max 4/day Jordan C Holter, DO      Or    diphenhydrAMINE  50 mg Intramuscular Q6H PRN Jordan C Holter, DO      hydrOXYzine HCL  50 mg Oral Q6H PRN Max 4/day HOLLI Lion      Or    diphenhydrAMINE  50 mg Intramuscular Q6H PRN HOLLI Lion      diphenhydrAMINE-zinc acetate   Topical BID PRN HOLLI Lion      escitalopram  20 mg Oral Daily HOLLI Lion      famotidine  20 mg Oral BID HOLLI Monge      fluticasone  1 spray Each Nare Daily Brina Guillen MD      glycopyrrolate  1 mg Oral TID Bora Rosario MD      haloperidol  1 mg Oral Q6H PRN HOLLI Lion      haloperidol  2.5 mg Oral Q4H PRN Max 4/day HOLLI Lion      haloperidol  5 mg Oral Q4H PRN Max 4/day HOLLI Lion      hydroCHLOROthiazide  12.5 mg Oral Daily HOLLI Galvan      hydrocortisone   Topical 4x Daily PRN HOLLI Loin      hydrOXYzine HCL  100 mg Oral Q6H PRN Max 4/day Ion Dupontter, DO      Or    LORazepam  2 mg Intramuscular Q6H PRN Ion Dupontter, DO      hydrOXYzine HCL  100 mg Oral Q6H PRN Max 4/day HOLLI Lion      Or    LORazepam  2 mg Intramuscular Q6H PRN HOLLI Lion      hydrOXYzine HCL  25 mg Oral Q6H PRN Max 4/day Jordan C Holter,       ibuprofen  600 mg Oral Q8H PRN HOLLI Lion      lactated ringers  50 mL/hr Intravenous  Continuous Antwan Dong MD      loratadine  10 mg Oral Daily Brina Guillen MD      melatonin  3 mg Oral HS PRN Bora Rosario MD      metFORMIN  500 mg Oral BID With Meals HOLLI Galvan      methocarbamol  500 mg Oral Q6H PRN HOLLI Lion      nicotine polacrilex  2 mg Oral Q4H PRN Bora Rosario MD      OLANZapine  5 mg Oral Q4H PRN Max 3/day [facility] Holter, DO      Or    OLANZapine  2.5 mg Intramuscular Q4H PRN Max 3/day [facility] Holter, DO      OLANZapine  5 mg Oral Q3H PRN Max 3/day [facility] Holter, DO      Or    OLANZapine  5 mg Intramuscular Q3H PRN Max 3/day [facility] Holter, DO      OLANZapine  2.5 mg Oral Q4H PRN Max 6/day [facility] Holter, DO      ondansetron  4 mg Oral Q6H PRN HOLLI Lion      pantoprazole  20 mg Oral Early Morning HOLLI Monge      polyethylene glycol  17 g Oral Daily PRN [facility] Holter, DO      propranolol  10 mg Oral Q12H KAYLIE HOLLI Lion      senna-docusate sodium  1 tablet Oral Daily PRN [facility] Holter, DO      senna-docusate sodium  2 tablet Oral HS Melvina Ambron, DO      traZODone  50 mg Oral HS PRN HOLLI Lion      white petrolatum-mineral oil   Topical TID PRN HOLLI Lion         Counseling / Coordination of Care: Total floor / unit time spent today 15 minutes. Greater than 50% of total time was spent with the patient and / or family counseling and / or somewhat receptive to supportive listening and teaching positive coping skills to deal with symptom mangement.     Patient's Rights, confidentiality and exceptions to confidentiality, use of automated medical record, Behavioral Health Services staff access to medical record, and consent to treatment reviewed.    This note has been dictated and hence there may be problems with punctuation, spelling and formatting and if anyone has any concerns please address them to Dr. Rosario   This note is not shared with patient due to potential for making patient's condition  worse by knowing the content of the note.

## 2024-08-21 NOTE — NURSING NOTE
"Pt visible in milieu with select peers. States earlier Ithis evening - \"things not going well\" states when asked \"hearing voices\" \" they are going to kill me\" Pleasant cooperative.  Compliant with meds and meals. Given Nicorette gum @ 1703 and 2107 No behavior issues.Q 7min checks continue. No SI/HI  "

## 2024-08-22 LAB
BNP SERPL-MCNC: 16 PG/ML (ref 0–100)
CK SERPL-CCNC: 197 U/L (ref 39–308)
CLOZAPINE SERPL-MCNC: 581 NG/ML (ref 350–900)
CRP SERPL QL: 10.3 MG/L

## 2024-08-22 PROCEDURE — 82550 ASSAY OF CK (CPK): CPT

## 2024-08-22 PROCEDURE — 99232 SBSQ HOSP IP/OBS MODERATE 35: CPT | Performed by: PSYCHIATRY & NEUROLOGY

## 2024-08-22 PROCEDURE — 83880 ASSAY OF NATRIURETIC PEPTIDE: CPT

## 2024-08-22 PROCEDURE — 86140 C-REACTIVE PROTEIN: CPT

## 2024-08-22 PROCEDURE — 80159 DRUG ASSAY CLOZAPINE: CPT | Performed by: PSYCHIATRY & NEUROLOGY

## 2024-08-22 RX ADMIN — GLYCOPYRROLATE 1 MG: 1 TABLET ORAL at 08:36

## 2024-08-22 RX ADMIN — ARIPIPRAZOLE 15 MG: 15 TABLET ORAL at 08:36

## 2024-08-22 RX ADMIN — PROPRANOLOL HYDROCHLORIDE 10 MG: 10 TABLET ORAL at 08:36

## 2024-08-22 RX ADMIN — AMLODIPINE BESYLATE 5 MG: 5 TABLET ORAL at 08:35

## 2024-08-22 RX ADMIN — FAMOTIDINE 20 MG: 20 TABLET ORAL at 08:36

## 2024-08-22 RX ADMIN — CYANOCOBALAMIN TAB 1000 MCG 1000 MCG: 1000 TAB at 08:36

## 2024-08-22 RX ADMIN — PANTOPRAZOLE SODIUM 20 MG: 20 TABLET, DELAYED RELEASE ORAL at 06:38

## 2024-08-22 RX ADMIN — HYDROCHLOROTHIAZIDE 12.5 MG: 12.5 TABLET ORAL at 08:36

## 2024-08-22 RX ADMIN — METFORMIN HYDROCHLORIDE 500 MG: 500 TABLET, FILM COATED ORAL at 17:05

## 2024-08-22 RX ADMIN — NICOTINE POLACRILEX 2 MG: 2 GUM, CHEWING ORAL at 17:30

## 2024-08-22 RX ADMIN — ESCITALOPRAM OXALATE 20 MG: 10 TABLET, FILM COATED ORAL at 08:35

## 2024-08-22 RX ADMIN — LORATADINE 10 MG: 10 TABLET ORAL at 08:36

## 2024-08-22 RX ADMIN — FAMOTIDINE 20 MG: 20 TABLET ORAL at 17:05

## 2024-08-22 RX ADMIN — CLOZAPINE 300 MG: 200 TABLET ORAL at 21:15

## 2024-08-22 RX ADMIN — CLOZAPINE 200 MG: 200 TABLET ORAL at 16:45

## 2024-08-22 RX ADMIN — METFORMIN HYDROCHLORIDE 500 MG: 500 TABLET, FILM COATED ORAL at 08:35

## 2024-08-22 RX ADMIN — NICOTINE POLACRILEX 2 MG: 2 GUM, CHEWING ORAL at 21:32

## 2024-08-22 RX ADMIN — NICOTINE POLACRILEX 2 MG: 2 GUM, CHEWING ORAL at 13:03

## 2024-08-22 RX ADMIN — PROPRANOLOL HYDROCHLORIDE 10 MG: 10 TABLET ORAL at 21:16

## 2024-08-22 RX ADMIN — GLYCOPYRROLATE 1 MG: 1 TABLET ORAL at 16:45

## 2024-08-22 RX ADMIN — SENNOSIDES AND DOCUSATE SODIUM 2 TABLET: 8.6; 5 TABLET ORAL at 21:15

## 2024-08-22 RX ADMIN — GLYCOPYRROLATE 1 MG: 1 TABLET ORAL at 21:16

## 2024-08-22 RX ADMIN — NICOTINE POLACRILEX 2 MG: 2 GUM, CHEWING ORAL at 09:03

## 2024-08-22 RX ADMIN — FLUTICASONE PROPIONATE 1 SPRAY: 50 SPRAY, METERED NASAL at 08:36

## 2024-08-22 NOTE — NURSING NOTE
"Patient is calm and cooperative on the unit. Denies SI, HI, visual hallucinations. Patient states \"same shit, different day\" in regards to the voices he hears. Patient is visible on the unit and social with select peers. Utilizing nicotine gum. Attended therapy groups this morning. Denies any unmet needs at this time. Maintaining continuous rounding for safety.  "

## 2024-08-22 NOTE — PLAN OF CARE
Problem: Alteration in Thoughts and Perception  Goal: Verbalize thoughts and feelings  Description: Interventions:  - Promote a nonjudgmental and trusting relationship with the patient through active listening and therapeutic communication  - Assess patient's level of functioning, behavior and potential for risk  - Engage patient in 1 on 1 interactions  - Encourage patient to express fears, feelings, frustrations, and discuss symptoms    - [place] patient to reality, help patient recognize reality-based thinking   - Administer medications as ordered and assess for potential side effects  - Provide the patient education related to the signs and symptoms of the illness and desired effects of prescribed medications  Outcome: Progressing  Goal: Attend and participate in unit activities, including therapeutic, recreational, and educational groups  Description: Interventions:  -Encourage Visitation and family involvement in care  Outcome: Progressing     Problem: Ineffective Coping  Goal: Identifies healthy coping skills  Outcome: Progressing  Goal: Participates in unit activities  Description: Interventions:  - Provide therapeutic environment   - Provide required programming   - Redirect inappropriate behaviors   Outcome: Progressing     Problem: Depression  Goal: Refrain from isolation  Description: Interventions:  - Develop a trusting relationship   - Encourage socialization   Outcome: Progressing  Goal: Refrain from self-neglect  Outcome: Progressing

## 2024-08-22 NOTE — SOCIAL WORK
SW checked in with pt; pt reports no changes. Pt requested virtual visit with his sister. Visit scheduled for 6pm 8/22 on E[facility] 1

## 2024-08-22 NOTE — PROGRESS NOTES
Psychiatry Progress Note [facility]    Alberto Berumen 28 y.o. male MRN: [id number]  Unit/Bed#: [facility] 108-02 Encounter: [id number]  Code Status: Level 1 - Full Code    PCP: Joce Juan MD    Date of Admission:  3/29/2024 2008   Date of Service:  08/22/24    Patient Active Problem List   Diagnosis    GERD (gastroesophageal reflux disease)    Medical clearance for psychiatric admission    Schizoaffective disorder, bipolar type (HCC)    Tobacco abuse    T wave inversion in EKG    Syringoma    Chronic idiopathic constipation    Vitamin B 12 deficiency    Vitamin D deficiency    Confluent and reticulate papillomatosis    Class 2 obesity in adult    Primary hypertension    Elevated hemoglobin A1c    Bilateral lower extremity edema     Review of systems: Unremarkable   assessment  Overall Status: Still hoping his aunt would take him back but willing to consider the group home if he has no other option and does not feel safe for discharge yet because of the threatening voices he hears all the time but not self-abusive nor aggressive or agitated  certification Statement: The patient will continue to require additional inpatient hospital stay due to ongoing voices that are threatening to mixing lack of response to medications and ECT so far.     Medications: Clozapine 300 mg at bedtime and 200 mg at 4 PM, propranolol 10 mg every 12 hours as as needed for anxiety, Abilify 15 mg mg at bedtime Lexapro 20 mg once a day, atropine eyedrops sublingual for drooling of saliva and senna 2 tablets twice a day for constipation  All medications reviewed with patient and reviewed and continued for symptom management side effects from treatment: None reported  Medication changes   None today  Medication education   Risks side effects benefits and precautions of medications discussed with patient and he did verbalize an understanding about risks for metabolic syndrome from being on neuroleptics and is form tardive  dyskinesia etc. and special precautions about being on clozapine   Understanding of medications: Has some understanding   Justification for dual anti-psychotics: Not applicable    Non-pharmacological treatments  Continue with individual, group, milieu and occupational therapy using recovery principles and psycho-education about accepting illness and the need for treatment.   to contact aunt and explore ACT team referral which he never had  ECT to be continued  q 2 weekly for maintenance x 6  Refuses to see a dietician and agrees to do more exercises as he is about 100 lbs overweight   Prolactin level high so Risperdal replaced with Abilify which he has tolerated well so far    Safety  Safety and communication plan established to target dynamic risk factors discussed above.    Discharge Plan   Back to his aunt with an ACT team once ECT is completed but the aunt is now having second thoughts so he may need a referral to a CRR    Interval Progress   Beginning to accept the fact that he cannot return to his aunt but he is hoping she will change her mind and is considering going to a group home instead.  Again complaining of same threatening voices that tell him that he is going to be killed and his family as well that makes him sad and get passive death wishes but able to contract for safety.  Voices are not commanding at all but only threatening.  It appears that he has broken up with a female peer and no further incidents of inappropriate behavior reported between the 2.  Able to keep his controls with no need for behavioral PRNs and no need for security to be called on his behalf.  Attending some of the groups and usually visible in the mornings.    acceptance by patient: Now hoping to live with his aunt but accepting his need for hospitalization  Hopefulness in recovery: Hoping that aunt will take him back  Involved in reintegration process: Talking with and meeting with aunt and siblings from [facility]  [place] who come to visit with him personally   trusting in relationship with psychiatrist: Trusting  Sleep: Good  Appetite: Good  Compliance with Medications: Good  Group attendance: Most of the groups 8/9  Significant events and progress towards goals: Improving but still with same voices that are threatening    mental Status Exam  Appearance: age appropriate, improved grooming, looks older than stated age, overweight, with hair in dreadlocks casually dressed with a clean shaven face, fairly groomed with good eye contact found walking the hallway wearing hospital clothing with a sweatshirt on top casually dressed fairly groomed talking again with a female peer he had broken up with  behavior: Friendly polite with good eye contact but still with a constricted to flat affect with no good mood reactivity   speech: normal rate, normal volume, normal pitch  Mood: dysphoric, anxious, continues to report feeling good with the ECT   affect: constricted, inappropriate, mood-congruent still somewhat constricted with minimal mood reactivity thought Process: organized, logical, coherent, goal directed, linear, decreased rate of thoughts, slowing of thoughts, negative thinking, impaired abstract reasoning, concrete  Thought Content: paranoid ideation, some paranoia, grandiose ideas, intrusive thoughts, preoccupied, chronic, continues to report paranoia about people threatening to kill him and his family because of the voices.  He believes ECT has helped so that he is not much in his head.  No other delusions elicited.  No current suicidal or homicidal thoughts and no plans verbalized but today reports having passive death wishes because of voices with no plans and able to CFS and it has not changed yet  .  No phobias obsessions compulsions or distorted body perceptions reported.  Preoccupied with wanting to get ECTs more often despite reminding him that it may impair his memory and finally he agreed to such as it is  every 2  weeks  Perceptual Disturbances: Continues to claim to hear same threatening voices to kill him and his family  Hx Risk Factors: chronic psychiatric problems, chronic anxiety symptoms, chronic psychotic symptoms,    Sensorium: Oriented x 3 spheres and situation  cognition: recent and remote memory grossly intact  Consciousness: alert and awake  Attention: Attention and concentration span are intact  Intellect: appears to be of average intelligence  Insight: intact  Judgement: intact  Motor Activity: no abnormal movements     Vitals  Temp:  [97.7 °F (36.5 °C)-98.4 °F (36.9 °C)] 98.4 °F (36.9 °C)  HR:  [] 88  Resp:  [18] 18  BP: (121-137)/(71-83) 137/71  SpO2:  [99 %] 99 %  No intake or output data in the 24 hours ending 08/22/24 0806    Lab Results: All labs reviewed and prolactin level came down to normal on 8/1/2024, clozapine level 475 on 8/15/24  Current Facility-Administered Medications   Medication Dose Route Frequency Provider Last Rate    acetaminophen  650 mg Oral Q4H PRN Jordan C Holter, DO      acetaminophen  650 mg Oral Q6H PRN HOLLI Lion      aluminum-magnesium hydroxide-simethicone  30 mL Oral Q4H PRN Jordan C Holter, DO      amLODIPine  5 mg Oral Daily HOLLI Lion      ARIPiprazole  15 mg Oral Daily Bora Rosario MD      Artificial Tears  1 drop Both Eyes Q3H PRN Jordan C Holter, DO      atropine  1 drop Sublingual Daily PRN HOLLI Lion      haloperidol lactate  2.5 mg Intramuscular Q4H PRN Max 4/day HOLLI Lion      And    LORazepam  1 mg Intramuscular Q4H PRN Max 4/day HOLLI Lion      And    benztropine  0.5 mg Intramuscular Q4H PRN Max 4/day HOLLI Lion      benztropine  1 mg Intramuscular Q4H PRN Max 6/day Jordan C Holter,       haloperidol lactate  5 mg Intramuscular Q4H PRN Max 4/day HOLLI Lion      And    LORazepam  2 mg Intramuscular Q4H PRN Max 4/day HOLLI Lion      And    benztropine  1 mg Intramuscular Q4H PRN Max 4/day  HOLLI Lion      benztropine  1 mg Oral Q4H PRN Max 6/day HOLLI Lion      benztropine  1 mg Oral Q4H PRN Max 6/day Jordan C Holter, DO      bisacodyl  10 mg Rectal Daily PRN HOLLI Lion      calcium carbonate  500 mg Oral BID PRN HOLLI Monge      cloZAPine  200 mg Oral Before Dinner Broa Rosario MD      cloZAPine  300 mg Oral HS Bora Rosario MD      cyanocobalamin  1,000 mcg Oral Daily HOLLI Galvan      hydrOXYzine HCL  50 mg Oral Q6H PRN Max 4/day Jordan C Holter, DO      Or    diphenhydrAMINE  50 mg Intramuscular Q6H PRN Jordan C Holter, DO      hydrOXYzine HCL  50 mg Oral Q6H PRN Max 4/day HOLLI Lion      Or    diphenhydrAMINE  50 mg Intramuscular Q6H PRN HOLLI Lion      diphenhydrAMINE-zinc acetate   Topical BID PRN HOLLI Lion      escitalopram  20 mg Oral Daily HOLLI Lion      famotidine  20 mg Oral BID HOLLI Monge      fluticasone  1 spray Each Nare Daily Brina Guillen MD      glycopyrrolate  1 mg Oral TID Bora Rosario MD      haloperidol  1 mg Oral Q6H PRN HOLLI Lion      haloperidol  2.5 mg Oral Q4H PRN Max 4/day HOLLI Lion      haloperidol  5 mg Oral Q4H PRN Max 4/day HOLLI Lion      hydroCHLOROthiazide  12.5 mg Oral Daily HOLLI Galvan      hydrocortisone   Topical 4x Daily PRN HOLLI Lion      hydrOXYzine HCL  100 mg Oral Q6H PRN Max 4/day Jordan C Holter, DO      Or    LORazepam  2 mg Intramuscular Q6H PRN Jordan C Holter, DO      hydrOXYzine HCL  100 mg Oral Q6H PRN Max 4/day HOLLI Lion      Or    LORazepam  2 mg Intramuscular Q6H PRN HOLLI Lion      hydrOXYzine HCL  25 mg Oral Q6H PRN Max 4/day Ion Dupontter, DO      ibuprofen  600 mg Oral Q8H PRN Eveline Hangey, CRNP      lactated ringers  50 mL/hr Intravenous Continuous Antwan Dong MD      loratadine  10 mg Oral Daily Brina Guillen MD      melatonin  3 mg Oral HS PRN Bora Rosario MD       metFORMIN  500 mg Oral BID With Meals HOLLI Galvan      methocarbamol  500 mg Oral Q6H PRN HOLLI Lion      nicotine polacrilex  2 mg Oral Q4H PRN Bora Rosario MD      OLANZapine  5 mg Oral Q4H PRN Max 3/day [facility] Holter, DO      Or    OLANZapine  2.5 mg Intramuscular Q4H PRN Max 3/day [facility] Holter, DO      OLANZapine  5 mg Oral Q3H PRN Max 3/day [facility] Holter, DO      Or    OLANZapine  5 mg Intramuscular Q3H PRN Max 3/day [facility] Holter, DO      OLANZapine  2.5 mg Oral Q4H PRN Max 6/day [facility] Holter, DO      ondansetron  4 mg Oral Q6H PRN HOLLI Lion      pantoprazole  20 mg Oral Early Morning Eileen HOLLI Higuera      polyethylene glycol  17 g Oral Daily PRN [facility] Holter, DO      propranolol  10 mg Oral Q12H KAYLIE HOLLI Lion      senna-docusate sodium  1 tablet Oral Daily PRN [facility] Holter, DO      senna-docusate sodium  2 tablet Oral HS Melvina Ambron, DO      traZODone  50 mg Oral HS PRN HOLLI Lion      white petrolatum-mineral oil   Topical TID PRN HOLLI Lion         Counseling / Coordination of Care: Total floor / unit time spent today 15 minutes. Greater than 50% of total time was spent with the patient and / or family counseling and / or somewhat receptive to supportive listening and teaching positive coping skills to deal with symptom mangement.     Patient's Rights, confidentiality and exceptions to confidentiality, use of automated medical record, Behavioral Health Services staff access to medical record, and consent to treatment reviewed.    This note has been dictated and hence there may be problems with punctuation, spelling and formatting and if anyone has any concerns please address them to Dr. Rosario   This note is not shared with patient due to potential for making patient's condition worse by knowing the content of the note.

## 2024-08-22 NOTE — PROGRESS NOTES
08/22/24 0727   Team Meeting   Meeting Type Daily Rounds   Team Members Present   Team Members Present Physician;Nurse;;Other (Discipline and Name)   Patient/Family Present   Patient Present No   Patient's Family Present No     In attendance:  Dr. Alex Thomas, MD Dr. Jordan Holter, DO Guy Taylor, RN  Luisana Alvarado, [facility]  Carla Lux, [facility] [place]  MATILDA Daniel.S.    Groups: 8/9    Pt reports ongoing depression and AH; reports no changes or improvements. Pt denies feeling ready for discharge. Pt now waiting on CRR availability or family involvement. No bx issues noted.

## 2024-08-22 NOTE — NURSING NOTE
"Patient is calm with blunted affect. Socializes with select peers, attending groups. Says AH fluctuate throughout the day in severity saying \"its weird how they're coming and going, usually they are just always there.\" Denies SI/HI/VH. Showered, no unmet needs currently.   "

## 2024-08-23 LAB
BASOPHILS # BLD AUTO: 0.05 THOUSANDS/ÂΜL (ref 0–0.1)
BASOPHILS NFR BLD AUTO: 1 % (ref 0–1)
EOSINOPHIL # BLD AUTO: 0.59 THOUSAND/ÂΜL (ref 0–0.61)
EOSINOPHIL NFR BLD AUTO: 6 % (ref 0–6)
ERYTHROCYTE [DISTWIDTH] IN BLOOD BY AUTOMATED COUNT: 14.2 % (ref 11.6–15.1)
HCT VFR BLD AUTO: 44.3 % (ref 36.5–49.3)
HGB BLD-MCNC: 13.5 G/DL (ref 12–17)
IMM GRANULOCYTES # BLD AUTO: 0.04 THOUSAND/UL (ref 0–0.2)
IMM GRANULOCYTES NFR BLD AUTO: 0 % (ref 0–2)
LYMPHOCYTES # BLD AUTO: 3.44 THOUSANDS/ÂΜL (ref 0.6–4.47)
LYMPHOCYTES NFR BLD AUTO: 34 % (ref 14–44)
MCH RBC QN AUTO: 23.9 PG (ref 26.8–34.3)
MCHC RBC AUTO-ENTMCNC: 30.5 G/DL (ref 31.4–37.4)
MCV RBC AUTO: 78 FL (ref 82–98)
MONOCYTES # BLD AUTO: 0.86 THOUSAND/ÂΜL (ref 0.17–1.22)
MONOCYTES NFR BLD AUTO: 8 % (ref 4–12)
NEUTROPHILS # BLD AUTO: 5.27 THOUSANDS/ÂΜL (ref 1.85–7.62)
NEUTS SEG NFR BLD AUTO: 51 % (ref 43–75)
NRBC BLD AUTO-RTO: 0 /100 WBCS
PLATELET # BLD AUTO: 259 THOUSANDS/UL (ref 149–390)
PMV BLD AUTO: 10.4 FL (ref 8.9–12.7)
RBC # BLD AUTO: 5.66 MILLION/UL (ref 3.88–5.62)
WBC # BLD AUTO: 10.25 THOUSAND/UL (ref 4.31–10.16)

## 2024-08-23 PROCEDURE — 85025 COMPLETE CBC W/AUTO DIFF WBC: CPT

## 2024-08-23 PROCEDURE — 99232 SBSQ HOSP IP/OBS MODERATE 35: CPT | Performed by: PSYCHIATRY & NEUROLOGY

## 2024-08-23 RX ORDER — GLYCOPYRROLATE 1 MG/1
1 TABLET ORAL
Status: DISCONTINUED | OUTPATIENT
Start: 2024-08-23 | End: 2025-01-13

## 2024-08-23 RX ORDER — GLYCOPYRROLATE 1 MG/1
2 TABLET ORAL
Status: DISCONTINUED | OUTPATIENT
Start: 2024-08-23 | End: 2025-01-13

## 2024-08-23 RX ADMIN — NICOTINE POLACRILEX 2 MG: 2 GUM, CHEWING ORAL at 17:05

## 2024-08-23 RX ADMIN — CLOZAPINE 300 MG: 200 TABLET ORAL at 21:19

## 2024-08-23 RX ADMIN — GLYCOPYRROLATE 2 MG: 1 TABLET ORAL at 21:18

## 2024-08-23 RX ADMIN — GLYCOPYRROLATE 1 MG: 1 TABLET ORAL at 08:29

## 2024-08-23 RX ADMIN — NICOTINE POLACRILEX 2 MG: 2 GUM, CHEWING ORAL at 21:19

## 2024-08-23 RX ADMIN — ARIPIPRAZOLE 15 MG: 15 TABLET ORAL at 08:29

## 2024-08-23 RX ADMIN — METFORMIN HYDROCHLORIDE 500 MG: 500 TABLET, FILM COATED ORAL at 08:29

## 2024-08-23 RX ADMIN — LORATADINE 10 MG: 10 TABLET ORAL at 08:28

## 2024-08-23 RX ADMIN — FAMOTIDINE 20 MG: 20 TABLET ORAL at 08:29

## 2024-08-23 RX ADMIN — PANTOPRAZOLE SODIUM 20 MG: 20 TABLET, DELAYED RELEASE ORAL at 06:33

## 2024-08-23 RX ADMIN — NICOTINE POLACRILEX 2 MG: 2 GUM, CHEWING ORAL at 12:29

## 2024-08-23 RX ADMIN — HYDROCHLOROTHIAZIDE 12.5 MG: 12.5 TABLET ORAL at 08:29

## 2024-08-23 RX ADMIN — CLOZAPINE 200 MG: 200 TABLET ORAL at 17:05

## 2024-08-23 RX ADMIN — METFORMIN HYDROCHLORIDE 500 MG: 500 TABLET, FILM COATED ORAL at 17:05

## 2024-08-23 RX ADMIN — SENNOSIDES AND DOCUSATE SODIUM 2 TABLET: 8.6; 5 TABLET ORAL at 21:47

## 2024-08-23 RX ADMIN — PROPRANOLOL HYDROCHLORIDE 10 MG: 10 TABLET ORAL at 08:29

## 2024-08-23 RX ADMIN — GLYCOPYRROLATE 1 MG: 1 TABLET ORAL at 14:16

## 2024-08-23 RX ADMIN — CYANOCOBALAMIN TAB 1000 MCG 1000 MCG: 1000 TAB at 08:29

## 2024-08-23 RX ADMIN — FAMOTIDINE 20 MG: 20 TABLET ORAL at 17:06

## 2024-08-23 RX ADMIN — ESCITALOPRAM OXALATE 20 MG: 10 TABLET, FILM COATED ORAL at 08:29

## 2024-08-23 RX ADMIN — AMLODIPINE BESYLATE 5 MG: 5 TABLET ORAL at 08:28

## 2024-08-23 RX ADMIN — NICOTINE POLACRILEX 2 MG: 2 GUM, CHEWING ORAL at 08:29

## 2024-08-23 NOTE — PROGRESS NOTES
Psychiatry Progress Note [facility]    Alberto Berumen 28 y.o. male MRN: [id number]  Unit/Bed#: [facility] 108-02 Encounter: [id number]  Code Status: Level 1 - Full Code    PCP: Joce Juan MD    Date of Admission:  3/29/2024 2008   Date of Service:  08/23/24    Patient Active Problem List   Diagnosis    GERD (gastroesophageal reflux disease)    Medical clearance for psychiatric admission    Schizoaffective disorder, bipolar type (HCC)    Tobacco abuse    T wave inversion in EKG    Syringoma    Chronic idiopathic constipation    Vitamin B 12 deficiency    Vitamin D deficiency    Confluent and reticulate papillomatosis    Class 2 obesity in adult    Primary hypertension    Elevated hemoglobin A1c    Bilateral lower extremity edema     Review of systems: Unremarkable   assessment  Overall Status: Still with same threatening voices that tell him that he is going to be killed along with his family and this has been going on for more than 2 years but he claims ECTs have definitely helped.  Still isolated with a constricted affect and not sleepy and attending some groups and getting along with a female peer with no inappropriate behaviors towards her.  Still hoping that his aunt would take him back  certification Statement: The patient will continue to require additional inpatient hospital stay due to ongoing voices that are threatening to mixing lack of response to medications and ECT so far.     Medications: Clozapine 300 mg at bedtime and 200 mg at 4 PM, propranolol 10 mg every 12 hours as as needed for anxiety, Abilify 15 mg mg at bedtime Lexapro 20 mg once a day, atropine eyedrops sublingual for drooling of saliva and senna 2 tablets twice a day for constipation  All medications reviewed with patient and recommend they be continued for symptom management   side effects from treatment: None reported other than drooling at night  Medication changes   Increase Robinul at HS  Medication education    Risks side effects benefits and precautions of medications discussed with patient and he did verbalize an understanding about risks for metabolic syndrome from being on neuroleptics and is form tardive dyskinesia etc. and special precautions about being on clozapine   Understanding of medications: Has some understanding   Justification for dual anti-psychotics: Not applicable    Non-pharmacological treatments  Continue with individual, group, milieu and occupational therapy using recovery principles and psycho-education about accepting illness and the need for treatment.   to contact aunt and explore ACT team referral which he never had  ECT to be continued  q 2 weekly for maintenance x 6  Refuses to see a dietician and agrees to do more exercises as he is about 100 lbs overweight   Prolactin level high so Risperdal replaced with Abilify which he has tolerated well so far with prolactin level coming back to normal    Safety  Safety and communication plan established to target dynamic risk factors discussed above.    Discharge Plan   Back to his aunt with an ACT team once ECT is completed but the aunt is now having second thoughts so he may need a referral to a CRR    Interval Progress   Note accepting the fact that he may have to go to a group home but still hoping his aunt will take him back.  Again complaining of same threatening voices that are telling him he is going to be killed with his family but he has been hearing them for over 2 years.  Continues to feel depressed with passive death wishes over this but able to contract for safety.  Voices have not been commanding at all only threatening.  He is now talking with a female peer he had broken up with but no inappropriate behaviors reported between the 2.  Able to keep his behaviors under control with no need for PRNs and no need for security to be called on his behalf for attending some of the groups.    acceptance by patient: Now hoping to live  with his aunt but accepting his need for hospitalization  Hopefulness in recovery: Hoping that his aunt will take him back  Involved in reintegration process: Talking with and meeting with Angel and siblings from [place] who come to visit with him personally and through virtual visits   trusting in relationship with psychiatrist: Trusting  Sleep: Good  Appetite: Good  Compliance with Medications: Good  Group attendance: Most of the groups 8/9  Significant events and progress towards goals: Improving with same voices that are threatening that makes him depressed but not suicidal     mental Status Exam  Appearance: age appropriate, improved grooming, looks older than stated age, overweight, with hair in dreadlocks casually dressed with a clean shaven face, fairly groomed with good eye contact  awake and dressed in hospital clothing  behavior: Friendly and cooperative  speech: normal rate, normal volume, normal pitch  Mood: dysphoric, anxious, continues to report feeling good with the ECT   affect: constricted, inappropriate, mood-congruent.  Somewhat constricted with minimal mood reactivity   thought Process: organized, logical, coherent, goal directed, linear, decreased rate of thoughts, slowing of thoughts, negative thinking, impaired abstract reasoning, concrete  Thought Content: paranoid ideation, some paranoia, grandiose ideas, intrusive thoughts, preoccupied, chronic, continues to report paranoia about people threatening to kill him and his family because of the voices.  He believes ECT has helped so that he is not much in his head.  No other delusions elicited.  No current suicidal or homicidal thoughts and no plans verbalized but today reports having passive death wishes because of voices with no plans and able to CFS and it has not changed yet  .  No phobias obsessions compulsions or distorted body perceptions reported.  Preoccupied with wanting to get ECTs more often despite reminding him that it may  impair his memory and finally he agreed to such as it is  every 2 weeks  Perceptual Disturbances: Continues to claim to hear same threatening voices to kill him and his family  Hx Risk Factors: chronic psychiatric problems, chronic anxiety symptoms, chronic psychotic symptoms,    Sensorium: Oriented x 3 spheres and situation  cognition: recent and remote memory grossly intact  Consciousness: alert and awake  Attention: Attention and concentration  good  Intellect: appears to be of average intelligence  Insight: intact  Judgement: intact  Motor Activity: no abnormal movements     Vitals  Temp:  [97.5 °F (36.4 °C)-97.8 °F (36.6 °C)] 97.8 °F (36.6 °C)  HR:  [90-99] 93  Resp:  [18] 18  BP: (128-136)/(75-82) 128/78  SpO2:  [98 %-100 %] 98 %  No intake or output data in the 24 hours ending 08/23/24 0807    Lab Results: All labs reviewed and prolactin level came down to normal on 8/1/2024, clozapine level 475 on 8/15/24  Current Facility-Administered Medications   Medication Dose Route Frequency Provider Last Rate    acetaminophen  650 mg Oral Q4H PRN Jordan C Holter, DO      acetaminophen  650 mg Oral Q6H PRN HOLLI Lion      aluminum-magnesium hydroxide-simethicone  30 mL Oral Q4H PRN Jordan C Holter,       amLODIPine  5 mg Oral Daily HOLLI Lion      ARIPiprazole  15 mg Oral Daily Bora Rosario MD      Artificial Tears  1 drop Both Eyes Q3H PRN Jordan C Holter, DO      atropine  1 drop Sublingual Daily PRN HOLLI Lion      haloperidol lactate  2.5 mg Intramuscular Q4H PRN Max 4/day HOLLI Lion      And    LORazepam  1 mg Intramuscular Q4H PRN Max 4/day HOLLI Lion      And    benztropine  0.5 mg Intramuscular Q4H PRN Max 4/day HOLIL Lion      benztropine  1 mg Intramuscular Q4H PRN Max 6/day Jordan C Holter,       haloperidol lactate  5 mg Intramuscular Q4H PRN Max 4/day HOLLI Lion      And    LORazepam  2 mg Intramuscular Q4H PRN Max 4/day HOLLI Lion       And    benztropine  1 mg Intramuscular Q4H PRN Max 4/day HOLLI Lion      benztropine  1 mg Oral Q4H PRN Max 6/day HOLLI Lion      benztropine  1 mg Oral Q4H PRN Max 6/day Ion FISCHER Holter, DO      bisacodyl  10 mg Rectal Daily PRN HOLLI Lion      calcium carbonate  500 mg Oral BID PRN HOLLI Monge      cloZAPine  200 mg Oral Before Dinner Bora Rosario MD      cloZAPine  300 mg Oral HS Bora Rosario MD      cyanocobalamin  1,000 mcg Oral Daily HOLLI Galvan      hydrOXYzine HCL  50 mg Oral Q6H PRN Max 4/day Jordan C Holter, DO      Or    diphenhydrAMINE  50 mg Intramuscular Q6H PRN Jordan C Holter, DO      hydrOXYzine HCL  50 mg Oral Q6H PRN Max 4/day HOLLI Lion      Or    diphenhydrAMINE  50 mg Intramuscular Q6H PRN HOLLI Lion      diphenhydrAMINE-zinc acetate   Topical BID PRN HOLLI Lion      escitalopram  20 mg Oral Daily HOLLI Lion      famotidine  20 mg Oral BID HOLLI Monge      fluticasone  1 spray Each Nare Daily Brina Guillen MD      glycopyrrolate  1 mg Oral TID Bora Rosario MD      haloperidol  1 mg Oral Q6H PRN HOLLI Lion      haloperidol  2.5 mg Oral Q4H PRN Max 4/day HOLLI Lion      haloperidol  5 mg Oral Q4H PRN Max 4/day HOLLI Lion      hydroCHLOROthiazide  12.5 mg Oral Daily HOLLI Galvan      hydrocortisone   Topical 4x Daily PRN HOLLI Lion      hydrOXYzine HCL  100 mg Oral Q6H PRN Max 4/day Ion FISCHER Holter, DO      Or    LORazepam  2 mg Intramuscular Q6H PRN Ion Dupontter, DO      hydrOXYzine HCL  100 mg Oral Q6H PRN Max 4/day HOLLI Lion      Or    LORazepam  2 mg Intramuscular Q6H PRN HOLLI Lion      hydrOXYzine HCL  25 mg Oral Q6H PRN Max 4/day Ion FISCHER Holter, DO      ibuprofen  600 mg Oral Q8H PRN HOLLI Lion      lactated ringers  50 mL/hr Intravenous Continuous Antwan Dong MD      loratadine  10 mg Oral Daily Brina  MD Wilmer      melatonin  3 mg Oral HS PRN Bora Rosario MD      metFORMIN  500 mg Oral BID With Meals HOLLI Galvan      methocarbamol  500 mg Oral Q6H PRN HOLLI Lion      nicotine polacrilex  2 mg Oral Q4H PRN Bora Rosario MD      OLANZapine  5 mg Oral Q4H PRN Max 3/day [facility] Holter, DO      Or    OLANZapine  2.5 mg Intramuscular Q4H PRN Max 3/day [facility] Holter, DO      OLANZapine  5 mg Oral Q3H PRN Max 3/day [facility] Holter, DO      Or    OLANZapine  5 mg Intramuscular Q3H PRN Max 3/day [facility] Holter, DO      OLANZapine  2.5 mg Oral Q4H PRN Max 6/day [facility] Holter, DO      ondansetron  4 mg Oral Q6H PRN HOLLI Lion      pantoprazole  20 mg Oral Early Morning Eileen Holliday HOLLI Jensen      polyethylene glycol  17 g Oral Daily PRN [facility] Holter, DO      propranolol  10 mg Oral Q12H KAYLIE HOLLI Lion      senna-docusate sodium  1 tablet Oral Daily PRN [facility] Holter, DO      senna-docusate sodium  2 tablet Oral HS Melvina Ambron, DO      traZODone  50 mg Oral HS PRN HOLLI Lion      white petrolatum-mineral oil   Topical TID PRN HOLLI Lion         Counseling / Coordination of Care: Total floor / unit time spent today 15 minutes. Greater than 50% of total time was spent with the patient and / or family counseling and / or somewhat receptive to supportive listening and teaching positive coping skills to deal with symptom mangement.     Patient's Rights, confidentiality and exceptions to confidentiality, use of automated medical record, Behavioral Health Services staff access to medical record, and consent to treatment reviewed.    This note has been dictated and hence there may be problems with punctuation, spelling and formatting and if anyone has any concerns please address them to Dr. Rosario   This note is not shared with patient due to potential for making patient's condition worse by knowing the content of the note.

## 2024-08-23 NOTE — PLAN OF CARE
Problem: Alteration in Thoughts and Perception  Goal: Verbalize thoughts and feelings  Description: Interventions:  - Promote a nonjudgmental and trusting relationship with the patient through active listening and therapeutic communication  - Assess patient's level of functioning, behavior and potential for risk  - Engage patient in 1 on 1 interactions  - Encourage patient to express fears, feelings, frustrations, and discuss symptoms    - Ubly patient to reality, help patient recognize reality-based thinking   - Administer medications as ordered and assess for potential side effects  - Provide the patient education related to the signs and symptoms of the illness and desired effects of prescribed medications  Outcome: Progressing  Goal: Refrain from acting on delusional thinking/internal stimuli  Description: Interventions:  - Monitor patient closely, per order   - Utilize least restrictive measures   - Set reasonable limits, give positive feedback for acceptable   - Administer medications as ordered and monitor of potential side effects  Outcome: Progressing  Goal: Agree to be compliant with medication regime, as prescribed and report medication side effects  Description: Interventions:  - Offer appropriate PRN medication and supervise ingestion; conduct AIMS, as needed   Outcome: Progressing     Problem: Ineffective Coping  Goal: Demonstrates healthy coping skills  Outcome: Progressing  Goal: Participates in unit activities  Description: Interventions:  - Provide therapeutic environment   - Provide required programming   - Redirect inappropriate behaviors   Outcome: Progressing     Problem: Depression  Goal: Refrain from isolation  Description: Interventions:  - Develop a trusting relationship   - Encourage socialization   Outcome: Progressing  Goal: Refrain from self-neglect  Outcome: Progressing     Problem: Anxiety  Goal: Anxiety is at manageable level  Description: Interventions:  - Assess and monitor  patient's anxiety level.   - Monitor for signs and symptoms (heart palpitations, chest pain, shortness of breath, headaches, nausea, feeling jumpy, restlessness, irritable, apprehensive).   - Collaborate with interdisciplinary team and initiate plan and interventions as ordered.  - [place] patient to unit/surroundings  - Explain treatment plan  - Encourage participation in care  - Encourage verbalization of concerns/fears  - Identify coping mechanisms  - Assist in developing anxiety-reducing skills  - Administer/offer alternative therapies  - Limit or eliminate stimulants  Outcome: Progressing

## 2024-08-23 NOTE — SOCIAL WORK
"SW and pt met 1:1  Pt reports his visit with his sister went really well last night. SW inquired about sister coming in person for visit; pt stated \"since I won't be here much longer she's going to wait on trying to come in person for now\". SW discussed pt's feelings regarding his limited housing options and the idea of discharge. Pt expressed concerns with discharge and not feeling ready. SW assured pt he will have supports and can remain on the unit until there is an appropriate discharge plan with adequate supports identified. Pt expressed some relief that he is not having to rush to make a decision and being \"kicked out\". Pt is on the [facility] waiting list for [facility].   "

## 2024-08-23 NOTE — PROGRESS NOTES
08/23/24 0749   Team Meeting   Meeting Type Daily Rounds   Team Members Present   Team Members Present Physician;Nurse;;Other (Discipline and Name)   Patient/Family Present   Patient Present No   Patient's Family Present No     In attendance:  MD Guy Gamino, RN  Luisana Alvarado[facility]  Carla Lux, [facility]  Irais Mcdowell M.S.    Groups: 8/9    Pt had labs completed. Pt reports depression and ongoing AH. Pt close with female peer on the unit again. No bx issues noted. Virtual visit with sister completed.

## 2024-08-23 NOTE — NURSING NOTE
Patient visible on unit, social with selective peers. Patient med and meal compliant. Patient cooperative upon approach, offered no complaints, friendly, polite. Patient behaviors controlled. Patient positive group attendance. Will continue to monitor.

## 2024-08-23 NOTE — NURSING NOTE
Patient visible and social on the milieu. He is pleasant and cooperative. Patient is compliant with medications. Appetite is good. He attends groups. No behavior issues.

## 2024-08-24 PROCEDURE — 99232 SBSQ HOSP IP/OBS MODERATE 35: CPT | Performed by: PSYCHIATRY & NEUROLOGY

## 2024-08-24 RX ADMIN — ARIPIPRAZOLE 15 MG: 15 TABLET ORAL at 08:36

## 2024-08-24 RX ADMIN — LORATADINE 10 MG: 10 TABLET ORAL at 08:36

## 2024-08-24 RX ADMIN — CLOZAPINE 200 MG: 200 TABLET ORAL at 17:01

## 2024-08-24 RX ADMIN — HYDROCHLOROTHIAZIDE 12.5 MG: 12.5 TABLET ORAL at 08:36

## 2024-08-24 RX ADMIN — CLOZAPINE 300 MG: 200 TABLET ORAL at 21:14

## 2024-08-24 RX ADMIN — PROPRANOLOL HYDROCHLORIDE 10 MG: 10 TABLET ORAL at 08:36

## 2024-08-24 RX ADMIN — NICOTINE POLACRILEX 2 MG: 2 GUM, CHEWING ORAL at 12:41

## 2024-08-24 RX ADMIN — METFORMIN HYDROCHLORIDE 500 MG: 500 TABLET, FILM COATED ORAL at 08:36

## 2024-08-24 RX ADMIN — GLYCOPYRROLATE 2 MG: 1 TABLET ORAL at 21:14

## 2024-08-24 RX ADMIN — GLYCOPYRROLATE 1 MG: 1 TABLET ORAL at 08:36

## 2024-08-24 RX ADMIN — PROPRANOLOL HYDROCHLORIDE 10 MG: 10 TABLET ORAL at 21:14

## 2024-08-24 RX ADMIN — SENNOSIDES AND DOCUSATE SODIUM 2 TABLET: 8.6; 5 TABLET ORAL at 21:14

## 2024-08-24 RX ADMIN — METFORMIN HYDROCHLORIDE 500 MG: 500 TABLET, FILM COATED ORAL at 17:01

## 2024-08-24 RX ADMIN — NICOTINE POLACRILEX 2 MG: 2 GUM, CHEWING ORAL at 08:55

## 2024-08-24 RX ADMIN — NICOTINE POLACRILEX 2 MG: 2 GUM, CHEWING ORAL at 21:14

## 2024-08-24 RX ADMIN — FAMOTIDINE 20 MG: 20 TABLET ORAL at 17:01

## 2024-08-24 RX ADMIN — FAMOTIDINE 20 MG: 20 TABLET ORAL at 08:36

## 2024-08-24 RX ADMIN — GLYCOPYRROLATE 1 MG: 1 TABLET ORAL at 13:37

## 2024-08-24 RX ADMIN — ESCITALOPRAM OXALATE 20 MG: 10 TABLET, FILM COATED ORAL at 08:36

## 2024-08-24 RX ADMIN — NICOTINE POLACRILEX 2 MG: 2 GUM, CHEWING ORAL at 17:11

## 2024-08-24 RX ADMIN — CYANOCOBALAMIN TAB 1000 MCG 1000 MCG: 1000 TAB at 12:23

## 2024-08-24 RX ADMIN — PANTOPRAZOLE SODIUM 20 MG: 20 TABLET, DELAYED RELEASE ORAL at 06:18

## 2024-08-24 RX ADMIN — AMLODIPINE BESYLATE 5 MG: 5 TABLET ORAL at 08:36

## 2024-08-24 NOTE — NURSING NOTE
"Pt calm and cooperative. Needs encouragement to be OOB for meals and meds. Does attend am group and then goes back to bed after breakfast. +meds w/ no issue. Compliant w/ mouth checks. After lunch more visible on the unit. Spends time on the phone and social w/ select peers. Good appetite. Denies SI/HI. Rates depression as \"the same as it's been\". Denies anxiety. Endorses threatening A/bautista at times. Denies need to utilize PRN medications. Agrees to notify staff of increase in psychosis or sudden change in mood in order to gain most appropriate care. Q7's maintained. Will cont to provide support as needed.  "

## 2024-08-24 NOTE — NURSING NOTE
Patient had an uneventful evening. Slept well throughout the night without any prn medication. No signs of distress noted..

## 2024-08-24 NOTE — PLAN OF CARE
Problem: Alteration in Thoughts and Perception  Goal: Agree to be compliant with medication regime, as prescribed and report medication side effects  Description: Interventions:  - Offer appropriate PRN medication and supervise ingestion; conduct AIMS, as needed   Outcome: Progressing     Problem: Alteration in Orientation  Goal: Interact with staff daily  Description: Interventions:  - Assess and re-assess patient's level of orientation  - Engage patient in 1 on 1 interactions, daily, for a minimum of 15 minutes   - Establish rapport/trust with patient   Outcome: Progressing  Goal: Express concerns related to confused thinking related to:  Description: Interventions:  - Encourage patient to express feelings, fears, frustrations, hopes  - Assign consistent caregivers   - Vian/re-orient patient as needed  - Allow comfort items, as appropriate  - Provide visual cues, signs, etc.   Outcome: Progressing  Goal: Allow medical examinations, as recommended  Description: Interventions:  - Provide physical/neurological exams and/or referrals, per provider   Outcome: Progressing  Goal: Attend and participate in unit activities, including therapeutic, recreational, and educational groups  Description: Interventions:  - Provide therapeutic and educational activities daily, encourage attendance and participation, and document same in the medical record   - Provide appropriate opportunities for reminiscence   - Provide a consistent daily routine   - Encourage family contact/visitation   Outcome: Progressing  Goal: Complete daily ADLs, including personal hygiene independently, as able  Description: Interventions:  - Observe, teach, and assist patient with ADLS  Outcome: Progressing     Problem: Electroconvulsive therapy (ECT)  Goal: Verbalizes/displays adequate comfort level or baseline comfort level  Description: Interventions:  - Encourage patient to monitor pain and request assistance  - Assess pain using appropriate pain  scale  - Administer analgesics based on type and severity of pain and evaluate response  - Implement non-pharmacological measures as appropriate and evaluate response  - Consider cultural and social influences on pain and pain management  - Notify physician/advanced practitioner if interventions unsuccessful or patient reports new pain  Outcome: Progressing  Goal: Achieves stable or improved neurological status  Description: INTERVENTIONS  - Monitor and report changes in neurological status  - Monitor vital signs such as temperature, blood pressure, glucose, and any other labs ordered   - Initiate measures to prevent increased intracranial pressure  - Monitor for seizure activity and implement precautions if appropriate      Outcome: Progressing  Goal: Achieves maximal functionality and self care  Description: INTERVENTIONS  - Monitor swallowing and airway patency with patient fatigue and changes in neurological status  - Encourage and assist patient to increase activity and self care.   - Encourage visually impaired, hearing impaired and aphasic patients to use assistive/communication devices  Outcome: Progressing  Goal: Maintain or return mobility to safest level of function  Description: INTERVENTIONS:  - Assess patient's ability to carry out ADLs; assess patient's baseline for ADL function and identify physical deficits which impact ability to perform ADLs (bathing, care of mouth/teeth, toileting, grooming, dressing, etc.)  - Assess/evaluate cause of self-care deficits   - Assess range of motion  - Assess patient's mobility  - Assess patient's need for assistive devices and provide as appropriate  - Encourage maximum independence but intervene and supervise when necessary  - Involve family in performance of ADLs  - Assess for home care needs following discharge   - Consider OT consult to assist with ADL evaluation and planning for discharge  - Provide patient education as appropriate  Outcome: Progressing  Goal:  Absence of urinary retention  Description: INTERVENTIONS:  - Assess patient’s ability to void and empty bladder  - Monitor I/O  - Bladder scan as needed  - Discuss with physician/AP medications to alleviate retention as needed  - Discuss catheterization for long term situations as appropriate  Outcome: Progressing  Goal: Minimal or absence of nausea and/or vomiting  Description: INTERVENTIONS:  - Administer IV fluids if ordered to ensure adequate hydration  - Maintain NPO status until nausea and vomiting are resolved  - Nasogastric tube if ordered  - Administer ordered antiemetic medications as needed  - Provide nonpharmacologic comfort measures as appropriate  - Advance diet as tolerated, if ordered  - Consider nutrition services referral to assist patient with adequate nutrition and appropriate food choices  Outcome: Progressing  Goal: Maintains adequate nutritional intake  Description: INTERVENTIONS:  - Monitor percentage of each meal consumed  - Identify factors contributing to decreased intake, treat as appropriate  - Assist with meals as needed  - Monitor I&O, weight, and lab values if indicated  - Obtain nutrition services referral as needed  Outcome: Progressing

## 2024-08-24 NOTE — PROGRESS NOTES
Psychiatric Progress Note - Department of Behavioral Health   Alberto Berumen 28 y.o. male MRN: [id number]  Unit/Bed#: [facility] 108-02 Encounter: [id number]    ASSESSMENT & PLAN     Diagnoses:   Principal Problem:    Schizoaffective disorder, bipolar type (HCC)  Active Problems:    GERD (gastroesophageal reflux disease)    Medical clearance for psychiatric admission    Tobacco abuse    T wave inversion in EKG    Chronic idiopathic constipation    Confluent and reticulate papillomatosis    Primary hypertension    Elevated hemoglobin A1c    Bilateral lower extremity edema      Treatment Recommendations/Precautions:  Continue to promote patient participation in therapeutic milieu.  Continue medical management per medicine.  Continue previously prescribed psychotropic medication regimen; see below.  Continue behavioral health checks q.7 minutes.   Continue vitals per behavioral health unit protocol.  Discharge date per primary team    SUBJECTIVE     Patient evaluated this a.m. for continuity of care. Patient was discussed at length with nursing and treatment team. Per nursing, patient means calm, cooperative, although isolative at times in the milieu, adherent to his medications without any acute adverse effects. No acute distress is noted throughout evaluation. Alberto Berumen denies suicidal/homicidal ideation in addition to thoughts of self-injury, receptive to crisis planning provided by this writer, contacting for safety in the inpatient setting, admitting to an ability to appropriately confide in staff including this writer.  Patient appears somewhat suspicious and paranoid on approach, superficial, scant, sparse, denying any/all psychiatric complaints/concerns    PSYCHIATRIC REVIEW OF SYSTEMS     Behavior over the last 24 hours:  unchanged  Sleep: adequate  Appetite: adequate  Medication side effects: No    REVIEW OF SYSTEMS   Review of systems: no complaints    OBJECTIVE     Vital Signs in Past 24  Hours:  Temp:  [97.5 °F (36.4 °C)-98.5 °F (36.9 °C)] 98.5 °F (36.9 °C)  HR:  [] 92  Resp:  [18] 18  BP: (109-169)/(64-93) 169/93    Intake/Output in Past 24 hours:  No intake/output data recorded.  No intake/output data recorded.        Laboratory Results:  I have personally reviewed all pertinent laboratory/tests results.  Most Recent Labs:   Lab Results   Component Value Date    WBC 10.25 (H) 08/23/2024    RBC 5.66 (H) 08/23/2024    HGB 13.5 08/23/2024    HCT 44.3 08/23/2024     08/23/2024    RDW 14.2 08/23/2024    NEUTROABS 5.27 08/23/2024    SODIUM 136 06/03/2024    K 3.8 06/03/2024    CL 96 06/03/2024    CO2 29 06/03/2024    BUN 10 06/03/2024    CREATININE 1.00 06/03/2024    GLUC 97 06/03/2024    GLUF 103 (H) 05/29/2024    CALCIUM 10.1 06/03/2024    AST 33 06/03/2024    ALT 72 (H) 06/03/2024    ALKPHOS 80 06/03/2024    TP 8.5 (H) 06/03/2024    ALB 4.7 06/03/2024    TBILI 0.30 06/03/2024    CHOLESTEROL 156 03/14/2024    HDL 44 03/14/2024    TRIG 133 03/14/2024    LDLCALC 85 03/14/2024    NONHDLC 112 03/14/2024    LITHIUM 0.61 01/09/2024    WRA2ZOTYLYUF 1.062 11/15/2023    HGBA1C 5.0 04/01/2024    EAG 97 04/01/2024       Behavioral Health Medications: all current active meds have been reviewed and current meds:   Current Facility-Administered Medications   Medication Dose Route Frequency    acetaminophen (TYLENOL) tablet 650 mg  650 mg Oral Q4H PRN    acetaminophen (TYLENOL) tablet 650 mg  650 mg Oral Q6H PRN    aluminum-magnesium hydroxide-simethicone (MAALOX) oral suspension 30 mL  30 mL Oral Q4H PRN    amLODIPine (NORVASC) tablet 5 mg  5 mg Oral Daily    ARIPiprazole (ABILIFY) tablet 15 mg  15 mg Oral Daily    Artificial Tears ophthalmic solution 1 drop  1 drop Both Eyes Q3H PRN    atropine (ISOPTO ATROPINE) 1 % ophthalmic solution 1 drop  1 drop Sublingual Daily PRN    haloperidol lactate (HALDOL) injection 2.5 mg  2.5 mg Intramuscular Q4H PRN Max 4/day    And    LORazepam (ATIVAN) injection 1 mg   1 mg Intramuscular Q4H PRN Max 4/day    And    benztropine (COGENTIN) injection 0.5 mg  0.5 mg Intramuscular Q4H PRN Max 4/day    benztropine (COGENTIN) injection 1 mg  1 mg Intramuscular Q4H PRN Max 6/day    haloperidol lactate (HALDOL) injection 5 mg  5 mg Intramuscular Q4H PRN Max 4/day    And    LORazepam (ATIVAN) injection 2 mg  2 mg Intramuscular Q4H PRN Max 4/day    And    benztropine (COGENTIN) injection 1 mg  1 mg Intramuscular Q4H PRN Max 4/day    benztropine (COGENTIN) tablet 1 mg  1 mg Oral Q4H PRN Max 6/day    benztropine (COGENTIN) tablet 1 mg  1 mg Oral Q4H PRN Max 6/day    bisacodyl (DULCOLAX) rectal suppository 10 mg  10 mg Rectal Daily PRN    calcium carbonate (TUMS) chewable tablet 500 mg  500 mg Oral BID PRN    clozapine (CLOZARIL) tablet 200 mg  200 mg Oral Before Dinner    cloZAPine (CLOZARIL) tablet 300 mg  300 mg Oral HS    cyanocobalamin (VITAMIN B-12) tablet 1,000 mcg  1,000 mcg Oral Daily    hydrOXYzine HCL (ATARAX) tablet 50 mg  50 mg Oral Q6H PRN Max 4/day    Or    diphenhydrAMINE (BENADRYL) injection 50 mg  50 mg Intramuscular Q6H PRN    hydrOXYzine HCL (ATARAX) tablet 50 mg  50 mg Oral Q6H PRN Max 4/day    Or    diphenhydrAMINE (BENADRYL) injection 50 mg  50 mg Intramuscular Q6H PRN    diphenhydrAMINE-zinc acetate (BENADRYL) 2-0.1 % cream   Topical BID PRN    escitalopram (LEXAPRO) tablet 20 mg  20 mg Oral Daily    famotidine (PEPCID) tablet 20 mg  20 mg Oral BID    fluticasone (FLONASE) 50 mcg/act nasal spray 1 spray  1 spray Each Nare Daily    glycopyrrolate (ROBINUL) tablet 1 mg  1 mg Oral BID (AM & Afternoon)    glycopyrrolate (ROBINUL) tablet 2 mg  2 mg Oral HS    haloperidol (HALDOL) tablet 1 mg  1 mg Oral Q6H PRN    haloperidol (HALDOL) tablet 2.5 mg  2.5 mg Oral Q4H PRN Max 4/day    haloperidol (HALDOL) tablet 5 mg  5 mg Oral Q4H PRN Max 4/day    hydroCHLOROthiazide tablet 12.5 mg  12.5 mg Oral Daily    hydrocortisone 1 % ointment   Topical 4x Daily PRN    hydrOXYzine HCL  (ATARAX) tablet 100 mg  100 mg Oral Q6H PRN Max 4/day    Or    LORazepam (ATIVAN) injection 2 mg  2 mg Intramuscular Q6H PRN    hydrOXYzine HCL (ATARAX) tablet 100 mg  100 mg Oral Q6H PRN Max 4/day    Or    LORazepam (ATIVAN) injection 2 mg  2 mg Intramuscular Q6H PRN    hydrOXYzine HCL (ATARAX) tablet 25 mg  25 mg Oral Q6H PRN Max 4/day    ibuprofen (MOTRIN) tablet 600 mg  600 mg Oral Q8H PRN    lactated ringers infusion  50 mL/hr Intravenous Continuous    loratadine (CLARITIN) tablet 10 mg  10 mg Oral Daily    melatonin tablet 3 mg  3 mg Oral HS PRN    metFORMIN (GLUCOPHAGE) tablet 500 mg  500 mg Oral BID With Meals    methocarbamol (ROBAXIN) tablet 500 mg  500 mg Oral Q6H PRN    nicotine polacrilex (NICORETTE) gum 2 mg  2 mg Oral Q4H PRN    OLANZapine (ZyPREXA) tablet 5 mg  5 mg Oral Q4H PRN Max 3/day    Or    OLANZapine (ZyPREXA) IM injection 2.5 mg  2.5 mg Intramuscular Q4H PRN Max 3/day    OLANZapine (ZyPREXA) tablet 5 mg  5 mg Oral Q3H PRN Max 3/day    Or    OLANZapine (ZyPREXA) IM injection 5 mg  5 mg Intramuscular Q3H PRN Max 3/day    OLANZapine (ZyPREXA) tablet 2.5 mg  2.5 mg Oral Q4H PRN Max 6/day    ondansetron (ZOFRAN-ODT) dispersible tablet 4 mg  4 mg Oral Q6H PRN    pantoprazole (PROTONIX) EC tablet 20 mg  20 mg Oral Early Morning    polyethylene glycol (MIRALAX) packet 17 g  17 g Oral Daily PRN    propranolol (INDERAL) tablet 10 mg  10 mg Oral Q12H KAYLIE    senna-docusate sodium (SENOKOT S) 8.6-50 mg per tablet 1 tablet  1 tablet Oral Daily PRN    senna-docusate sodium (SENOKOT S) 8.6-50 mg per tablet 2 tablet  2 tablet Oral HS    traZODone (DESYREL) tablet 50 mg  50 mg Oral HS PRN    white petrolatum-mineral oil (EUCERIN,HYDROCERIN) cream   Topical TID PRN   .    Risks, benefits and possible side effects of Medications:   Risks, benefits, and possible side effects of medications explained to patient and patient verbalizes understanding.      Mental Status Evaluation:  Appearance:  casually dressed,  disheveled, and older than stated age   Behavior:  psychomotor retardation, superficial, suspicious   Speech:  soft and scant   Mood:  euthymic   Affect:  blunted and mood-incongruent   Language sparse   Thought Process:  concrete   Thought Content:  delusions  persecutory, paranoid   Perceptual Disturbances: None although appears internally preoccupied and distracted   Risk Potential: Suicidal Ideations none, Homicidal Ideations none, and Potential for Aggression No   Sensorium:  person, place, and time/date   Cognition:  recent and remote memory grossly intact   Consciousness:  alert and awake    Attention: attention span appeared shorter than expected for age   Insight:  limited   Judgment: limited   Intellect Not assessed   Gait/Station: Not assessed; in bed   Motor Activity: no abnormal movements     Memory: Short and long term memory  fair     Progress Toward Goals: unchanged, as evidenced by their participation (or lack thereof) in individual, social and therapeutic milieu in addition to adherence to their medication regimen.    Recommended Treatment:   See above for assessment and plan.    Inpatient Psychiatric Certification: Based upon physical, mental and social evaluations, I certify that inpatient psychiatric services are medically necessary for this patient for a duration of greater than 2 midnights for the treatment of Schizoaffective disorder, bipolar type (HCC) including psychotropic medication management, participation in the therapeutic milieu and referrals as indicated. Available alternative community resources do not meet the patient's mental health care needs. I further attest that an established written individualized plan of care has been implemented and is outlined in the patient's medical records.    Counseling/Coordination of Care    Total unit time spent today was greater than 15 minutes. Greater than 50% of total time was spent with the patient and/or patient's relatives and/or  coordination of patient's care.     Patient's rights, confidentiality, exceptions to confidentiality, use of electronic medical record including appropriate staff access to medical record regarding behavioral health services and consent to treatment were reviewed.    Note Share:     This note was not shared with the patient due to reasonable likelihood of causing patient harm     This note has been constructed using a voice recognition system.    There may be translation, syntax, or grammatical errors. If you have any questions, please contact the dictating provider.    Jordan Christopher Holter, DO 08/24/24

## 2024-08-25 PROCEDURE — 99232 SBSQ HOSP IP/OBS MODERATE 35: CPT | Performed by: PSYCHIATRY & NEUROLOGY

## 2024-08-25 RX ADMIN — SENNOSIDES AND DOCUSATE SODIUM 2 TABLET: 8.6; 5 TABLET ORAL at 21:24

## 2024-08-25 RX ADMIN — NICOTINE POLACRILEX 2 MG: 2 GUM, CHEWING ORAL at 21:24

## 2024-08-25 RX ADMIN — ESCITALOPRAM OXALATE 20 MG: 10 TABLET, FILM COATED ORAL at 08:40

## 2024-08-25 RX ADMIN — CYANOCOBALAMIN TAB 1000 MCG 1000 MCG: 1000 TAB at 08:40

## 2024-08-25 RX ADMIN — HYDROCHLOROTHIAZIDE 12.5 MG: 12.5 TABLET ORAL at 08:40

## 2024-08-25 RX ADMIN — NICOTINE POLACRILEX 2 MG: 2 GUM, CHEWING ORAL at 17:05

## 2024-08-25 RX ADMIN — GLYCOPYRROLATE 2 MG: 1 TABLET ORAL at 21:24

## 2024-08-25 RX ADMIN — ARIPIPRAZOLE 15 MG: 15 TABLET ORAL at 08:40

## 2024-08-25 RX ADMIN — CLOZAPINE 300 MG: 200 TABLET ORAL at 21:24

## 2024-08-25 RX ADMIN — LORATADINE 10 MG: 10 TABLET ORAL at 08:40

## 2024-08-25 RX ADMIN — NICOTINE POLACRILEX 2 MG: 2 GUM, CHEWING ORAL at 08:40

## 2024-08-25 RX ADMIN — METFORMIN HYDROCHLORIDE 500 MG: 500 TABLET, FILM COATED ORAL at 08:44

## 2024-08-25 RX ADMIN — PROPRANOLOL HYDROCHLORIDE 10 MG: 10 TABLET ORAL at 21:24

## 2024-08-25 RX ADMIN — GLYCOPYRROLATE 1 MG: 1 TABLET ORAL at 08:40

## 2024-08-25 RX ADMIN — FAMOTIDINE 20 MG: 20 TABLET ORAL at 08:40

## 2024-08-25 RX ADMIN — GLYCOPYRROLATE 1 MG: 1 TABLET ORAL at 14:51

## 2024-08-25 RX ADMIN — CLOZAPINE 200 MG: 200 TABLET ORAL at 17:05

## 2024-08-25 RX ADMIN — FAMOTIDINE 20 MG: 20 TABLET ORAL at 17:05

## 2024-08-25 RX ADMIN — POLYETHYLENE GLYCOL 3350 17 G: 17 POWDER, FOR SOLUTION ORAL at 09:43

## 2024-08-25 RX ADMIN — METFORMIN HYDROCHLORIDE 500 MG: 500 TABLET, FILM COATED ORAL at 17:05

## 2024-08-25 RX ADMIN — PANTOPRAZOLE SODIUM 20 MG: 20 TABLET, DELAYED RELEASE ORAL at 06:32

## 2024-08-25 RX ADMIN — PROPRANOLOL HYDROCHLORIDE 10 MG: 10 TABLET ORAL at 08:40

## 2024-08-25 RX ADMIN — AMLODIPINE BESYLATE 5 MG: 5 TABLET ORAL at 08:40

## 2024-08-25 NOTE — PLAN OF CARE
Problem: Alteration in Thoughts and Perception  Goal: Treatment Goal: Gain control of psychotic behaviors/thinking, reduce/eliminate presenting symptoms and demonstrate improved reality functioning upon discharge  Outcome: Progressing  Goal: Verbalize thoughts and feelings  Description: Interventions:  - Promote a nonjudgmental and trusting relationship with the patient through active listening and therapeutic communication  - Assess patient's level of functioning, behavior and potential for risk  - Engage patient in 1 on 1 interactions  - Encourage patient to express fears, feelings, frustrations, and discuss symptoms    - [place] patient to reality, help patient recognize reality-based thinking   - Administer medications as ordered and assess for potential side effects  - Provide the patient education related to the signs and symptoms of the illness and desired effects of prescribed medications  Outcome: Progressing  Goal: Agree to be compliant with medication regime, as prescribed and report medication side effects  Description: Interventions:  - Offer appropriate PRN medication and supervise ingestion; conduct AIMS, as needed   Outcome: Progressing  Goal: Attend and participate in unit activities, including therapeutic, recreational, and educational groups  Description: Interventions:  -Encourage Visitation and family involvement in care  Outcome: Progressing  Goal: Recognize dysfunctional thoughts, communicate reality-based thoughts at the time of discharge  Description: Interventions:  - Provide medication and psycho-education to assist patient in compliance and developing insight into his/her illness   Outcome: Progressing  Goal: Complete daily ADLs, including personal hygiene independently, as able  Description: Interventions:  - Observe, teach, and assist patient with ADLS  - Monitor and promote a balance of rest/activity, with adequate nutrition and elimination   Outcome: Progressing     Problem:  Ineffective Coping  Goal: Identifies ineffective coping skills  Outcome: Progressing  Goal: Identifies healthy coping skills  Outcome: Progressing  Goal: Demonstrates healthy coping skills  Outcome: Progressing  Goal: Participates in unit activities  Description: Interventions:  - Provide therapeutic environment   - Provide required programming   - Redirect inappropriate behaviors   Outcome: Progressing  Goal: Patient/Family participate in treatment and DC plans  Description: Interventions:  - Provide therapeutic environment  Outcome: Progressing  Goal: Patient/Family verbalizes awareness of resources  Outcome: Progressing     Problem: Depression  Goal: Treatment Goal: Demonstrate behavioral control of depressive symptoms, verbalize feelings of improved mood/affect, and adopt new coping skills prior to discharge  Outcome: Progressing  Goal: Verbalize thoughts and feelings  Description: Interventions:  - Assess and re-assess patient's level of risk   - Engage patient in 1:1 interactions, daily, for a minimum of 15 minutes   - Encourage patient to express feelings, fears, frustrations, hopes   Outcome: Progressing  Goal: Refrain from harming self  Description: Interventions:  - Monitor patient closely, per order   - Supervise medication ingestion, monitor effects and side effects   Outcome: Progressing  Goal: Refrain from isolation  Description: Interventions:  - Develop a trusting relationship   - Encourage socialization   Outcome: Progressing  Goal: Refrain from self-neglect  Outcome: Progressing  Goal: Attend and participate in unit activities, including therapeutic, recreational, and educational groups  Description: Interventions:  - Provide therapeutic and educational activities daily, encourage attendance and participation, and document same in the medical record   Outcome: Progressing  Goal: Complete daily ADLs, including personal hygiene independently, as able  Description: Interventions:  - Observe, teach,  and assist patient with ADLS  -  Monitor and promote a balance of rest/activity, with adequate nutrition and elimination   Outcome: Progressing     Problem: Anxiety  Goal: Anxiety is at manageable level  Description: Interventions:  - Assess and monitor patient's anxiety level.   - Monitor for signs and symptoms (heart palpitations, chest pain, shortness of breath, headaches, nausea, feeling jumpy, restlessness, irritable, apprehensive).   - Collaborate with interdisciplinary team and initiate plan and interventions as ordered.  - [place] patient to unit/surroundings  - Explain treatment plan  - Encourage participation in care  - Encourage verbalization of concerns/fears  - Identify coping mechanisms  - Assist in developing anxiety-reducing skills  - Administer/offer alternative therapies  - Limit or eliminate stimulants  Outcome: Progressing     Problem: Alteration in Orientation  Goal: Treatment Goal: Demonstrate a reduction of confusion and improved orientation to person, place, time and/or situation upon discharge, according to optimum baseline/ability  Outcome: Progressing  Goal: Interact with staff daily  Description: Interventions:  - Assess and re-assess patient's level of orientation  - Engage patient in 1 on 1 interactions, daily, for a minimum of 15 minutes   - Establish rapport/trust with patient   Outcome: Progressing  Goal: Express concerns related to confused thinking related to:  Description: Interventions:  - Encourage patient to express feelings, fears, frustrations, hopes  - Assign consistent caregivers   - [place]/re-orient patient as needed  - Allow comfort items, as appropriate  - Provide visual cues, signs, etc.   Outcome: Progressing  Goal: Allow medical examinations, as recommended  Description: Interventions:  - Provide physical/neurological exams and/or referrals, per provider   Outcome: Progressing  Goal: Cooperate with recommended testing/procedures  Description: Interventions:  -  Determine need for ancillary testing  - Observe for mental status changes  - Implement falls/precaution protocol   Outcome: Progressing  Goal: Attend and participate in unit activities, including therapeutic, recreational, and educational groups  Description: Interventions:  - Provide therapeutic and educational activities daily, encourage attendance and participation, and document same in the medical record   - Provide appropriate opportunities for reminiscence   - Provide a consistent daily routine   - Encourage family contact/visitation   Outcome: Progressing  Goal: Complete daily ADLs, including personal hygiene independently, as able  Description: Interventions:  - Observe, teach, and assist patient with ADLS  Outcome: Progressing     Problem: Electroconvulsive therapy (ECT)  Goal: Treatment Goal: Demonstrate a reduction of confusion and improved orientation to person, place, time and/or situation upon discharge, according to optimum baseline/ability  Outcome: Progressing  Goal: Verbalizes/displays adequate comfort level or baseline comfort level  Description: Interventions:  - Encourage patient to monitor pain and request assistance  - Assess pain using appropriate pain scale  - Administer analgesics based on type and severity of pain and evaluate response  - Implement non-pharmacological measures as appropriate and evaluate response  - Consider cultural and social influences on pain and pain management  - Notify physician/advanced practitioner if interventions unsuccessful or patient reports new pain  Outcome: Progressing  Goal: Achieves stable or improved neurological status  Description: INTERVENTIONS  - Monitor and report changes in neurological status  - Monitor vital signs such as temperature, blood pressure, glucose, and any other labs ordered   - Initiate measures to prevent increased intracranial pressure  - Monitor for seizure activity and implement precautions if appropriate      Outcome:  Progressing  Goal: Achieves maximal functionality and self care  Description: INTERVENTIONS  - Monitor swallowing and airway patency with patient fatigue and changes in neurological status  - Encourage and assist patient to increase activity and self care.   - Encourage visually impaired, hearing impaired and aphasic patients to use assistive/communication devices  Outcome: Progressing  Goal: Maintain or return mobility to safest level of function  Description: INTERVENTIONS:  - Assess patient's ability to carry out ADLs; assess patient's baseline for ADL function and identify physical deficits which impact ability to perform ADLs (bathing, care of mouth/teeth, toileting, grooming, dressing, etc.)  - Assess/evaluate cause of self-care deficits   - Assess range of motion  - Assess patient's mobility  - Assess patient's need for assistive devices and provide as appropriate  - Encourage maximum independence but intervene and supervise when necessary  - Involve family in performance of ADLs  - Assess for home care needs following discharge   - Consider OT consult to assist with ADL evaluation and planning for discharge  - Provide patient education as appropriate  Outcome: Progressing  Goal: Absence of urinary retention  Description: INTERVENTIONS:  - Assess patient’s ability to void and empty bladder  - Monitor I/O  - Bladder scan as needed  - Discuss with physician/AP medications to alleviate retention as needed  - Discuss catheterization for long term situations as appropriate  Outcome: Progressing  Goal: Minimal or absence of nausea and/or vomiting  Description: INTERVENTIONS:  - Administer IV fluids if ordered to ensure adequate hydration  - Maintain NPO status until nausea and vomiting are resolved  - Nasogastric tube if ordered  - Administer ordered antiemetic medications as needed  - Provide nonpharmacologic comfort measures as appropriate  - Advance diet as tolerated, if ordered  - Consider nutrition services  referral to assist patient with adequate nutrition and appropriate food choices  Outcome: Progressing  Goal: Maintains adequate nutritional intake  Description: INTERVENTIONS:  - Monitor percentage of each meal consumed  - Identify factors contributing to decreased intake, treat as appropriate  - Assist with meals as needed  - Monitor I&O, weight, and lab values if indicated  - Obtain nutrition services referral as needed  Outcome: Progressing

## 2024-08-25 NOTE — NURSING NOTE
Alberto maintained on ongoing SAFE precaution without incident on this shift.  Awake, alert , visible, pleasant, and cooperative upon approach.   Attended and participated in 3 out of 8 groups today.  Continues to be compliant with medication regimen and had snack.  Spend most of his time taking with a female peer.  Both parties are maintaining proper distance, no inappropriate observed.  Denies any depression or anxiety.  No delusion  or T/V hallucination noted. Amid to auditory hallucination stating they going to hurt him and his family.  Alberto is aware that his family and him are safe and that the voices are,just voices.  Behavioral control

## 2024-08-25 NOTE — NURSING NOTE
"Patient visible and social in the milieu. He was compliant and cooperative. He was rubbing female peer's back. Patient states \"she was upset and  I was comforting her\" Writer explained personal space and boundaries. Patient was cooperative with weekly assessment except weight.  "

## 2024-08-25 NOTE — PROGRESS NOTES
Psychiatric Progress Note - Department of Behavioral Health   Alberto Berumen 28 y.o. male MRN: [id number]  Unit/Bed#: [facility] 108-02 Encounter: [id number]    ASSESSMENT & PLAN     Diagnoses:   Principal Problem:    Schizoaffective disorder, bipolar type (HCC)  Active Problems:    GERD (gastroesophageal reflux disease)    Medical clearance for psychiatric admission    Tobacco abuse    T wave inversion in EKG    Chronic idiopathic constipation    Confluent and reticulate papillomatosis    Primary hypertension    Elevated hemoglobin A1c    Bilateral lower extremity edema      Treatment Recommendations/Precautions:  Continue to promote patient participation in therapeutic milieu.  Continue medical management per medicine.  Continue previously prescribed psychotropic medication regimen; see below.  Continue behavioral health checks q.7 minutes.   Continue vitals per behavioral health unit protocol.  Discharge date per primary team    SUBJECTIVE     Patient evaluated this a.m. for continuity of care. Patient was discussed at length with nursing and treatment team. Per nursing, patient remains calm, cooperative, present in the milieu, pacing throughout said milieu with peer, adherent to his medications without any acute adverse effects. No acute distress is noted throughout evaluation. Alberto Berumen denies suicidal/homicidal ideation in addition to thoughts of self-injury, receptive to crisis planning provided by this writer, contacting for safety in the inpatient setting, admitting to an ability to appropriately confide in staff including this writer.  Patient appears scant, sparse, minimally interactive involving this writer, superficial, denying any/all psychiatric complaints/concerns despite previous complaints pertaining to intermittent instances of auditory hallucinations that are negative and derogatory in content pertaining to his relatives, somewhat suspicious and paranoid on approach    PSYCHIATRIC REVIEW  OF SYSTEMS     Behavior over the last 24 hours:  unchanged  Sleep: Adequate  Appetite: Adequate  Medication side effects: None    REVIEW OF SYSTEMS   Review of systems: No complaints    OBJECTIVE     Vital Signs in Past 24 Hours:  Temp:  [97.8 °F (36.6 °C)-97.9 °F (36.6 °C)] 97.8 °F (36.6 °C)  HR:  [] 92  Resp:  [18] 18  BP: (132-140)/(78-90) 140/78    Intake/Output in Past 24 hours:  No intake/output data recorded.  No intake/output data recorded.        Laboratory Results:  I have personally reviewed all pertinent laboratory/tests results.  Most Recent Labs:   Lab Results   Component Value Date    WBC 10.25 (H) 08/23/2024    RBC 5.66 (H) 08/23/2024    HGB 13.5 08/23/2024    HCT 44.3 08/23/2024     08/23/2024    RDW 14.2 08/23/2024    NEUTROABS 5.27 08/23/2024    SODIUM 136 06/03/2024    K 3.8 06/03/2024    CL 96 06/03/2024    CO2 29 06/03/2024    BUN 10 06/03/2024    CREATININE 1.00 06/03/2024    GLUC 97 06/03/2024    GLUF 103 (H) 05/29/2024    CALCIUM 10.1 06/03/2024    AST 33 06/03/2024    ALT 72 (H) 06/03/2024    ALKPHOS 80 06/03/2024    TP 8.5 (H) 06/03/2024    ALB 4.7 06/03/2024    TBILI 0.30 06/03/2024    CHOLESTEROL 156 03/14/2024    HDL 44 03/14/2024    TRIG 133 03/14/2024    LDLCALC 85 03/14/2024    NONHDLC 112 03/14/2024    LITHIUM 0.61 01/09/2024    NDQ7ANAQQGCU 1.062 11/15/2023    HGBA1C 5.0 04/01/2024    EAG 97 04/01/2024       Behavioral Health Medications: all current active meds have been reviewed and current meds:   Current Facility-Administered Medications   Medication Dose Route Frequency    acetaminophen (TYLENOL) tablet 650 mg  650 mg Oral Q4H PRN    acetaminophen (TYLENOL) tablet 650 mg  650 mg Oral Q6H PRN    aluminum-magnesium hydroxide-simethicone (MAALOX) oral suspension 30 mL  30 mL Oral Q4H PRN    amLODIPine (NORVASC) tablet 5 mg  5 mg Oral Daily    ARIPiprazole (ABILIFY) tablet 15 mg  15 mg Oral Daily    Artificial Tears ophthalmic solution 1 drop  1 drop Both Eyes Q3H PRN     atropine (ISOPTO ATROPINE) 1 % ophthalmic solution 1 drop  1 drop Sublingual Daily PRN    haloperidol lactate (HALDOL) injection 2.5 mg  2.5 mg Intramuscular Q4H PRN Max 4/day    And    LORazepam (ATIVAN) injection 1 mg  1 mg Intramuscular Q4H PRN Max 4/day    And    benztropine (COGENTIN) injection 0.5 mg  0.5 mg Intramuscular Q4H PRN Max 4/day    benztropine (COGENTIN) injection 1 mg  1 mg Intramuscular Q4H PRN Max 6/day    haloperidol lactate (HALDOL) injection 5 mg  5 mg Intramuscular Q4H PRN Max 4/day    And    LORazepam (ATIVAN) injection 2 mg  2 mg Intramuscular Q4H PRN Max 4/day    And    benztropine (COGENTIN) injection 1 mg  1 mg Intramuscular Q4H PRN Max 4/day    benztropine (COGENTIN) tablet 1 mg  1 mg Oral Q4H PRN Max 6/day    benztropine (COGENTIN) tablet 1 mg  1 mg Oral Q4H PRN Max 6/day    bisacodyl (DULCOLAX) rectal suppository 10 mg  10 mg Rectal Daily PRN    calcium carbonate (TUMS) chewable tablet 500 mg  500 mg Oral BID PRN    clozapine (CLOZARIL) tablet 200 mg  200 mg Oral Before Dinner    cloZAPine (CLOZARIL) tablet 300 mg  300 mg Oral HS    cyanocobalamin (VITAMIN B-12) tablet 1,000 mcg  1,000 mcg Oral Daily    hydrOXYzine HCL (ATARAX) tablet 50 mg  50 mg Oral Q6H PRN Max 4/day    Or    diphenhydrAMINE (BENADRYL) injection 50 mg  50 mg Intramuscular Q6H PRN    hydrOXYzine HCL (ATARAX) tablet 50 mg  50 mg Oral Q6H PRN Max 4/day    Or    diphenhydrAMINE (BENADRYL) injection 50 mg  50 mg Intramuscular Q6H PRN    diphenhydrAMINE-zinc acetate (BENADRYL) 2-0.1 % cream   Topical BID PRN    escitalopram (LEXAPRO) tablet 20 mg  20 mg Oral Daily    famotidine (PEPCID) tablet 20 mg  20 mg Oral BID    fluticasone (FLONASE) 50 mcg/act nasal spray 1 spray  1 spray Each Nare Daily    glycopyrrolate (ROBINUL) tablet 1 mg  1 mg Oral BID (AM & Afternoon)    glycopyrrolate (ROBINUL) tablet 2 mg  2 mg Oral HS    haloperidol (HALDOL) tablet 1 mg  1 mg Oral Q6H PRN    haloperidol (HALDOL) tablet 2.5 mg  2.5 mg  Oral Q4H PRN Max 4/day    haloperidol (HALDOL) tablet 5 mg  5 mg Oral Q4H PRN Max 4/day    hydroCHLOROthiazide tablet 12.5 mg  12.5 mg Oral Daily    hydrocortisone 1 % ointment   Topical 4x Daily PRN    hydrOXYzine HCL (ATARAX) tablet 100 mg  100 mg Oral Q6H PRN Max 4/day    Or    LORazepam (ATIVAN) injection 2 mg  2 mg Intramuscular Q6H PRN    hydrOXYzine HCL (ATARAX) tablet 100 mg  100 mg Oral Q6H PRN Max 4/day    Or    LORazepam (ATIVAN) injection 2 mg  2 mg Intramuscular Q6H PRN    hydrOXYzine HCL (ATARAX) tablet 25 mg  25 mg Oral Q6H PRN Max 4/day    ibuprofen (MOTRIN) tablet 600 mg  600 mg Oral Q8H PRN    lactated ringers infusion  50 mL/hr Intravenous Continuous    loratadine (CLARITIN) tablet 10 mg  10 mg Oral Daily    melatonin tablet 3 mg  3 mg Oral HS PRN    metFORMIN (GLUCOPHAGE) tablet 500 mg  500 mg Oral BID With Meals    methocarbamol (ROBAXIN) tablet 500 mg  500 mg Oral Q6H PRN    nicotine polacrilex (NICORETTE) gum 2 mg  2 mg Oral Q4H PRN    OLANZapine (ZyPREXA) tablet 5 mg  5 mg Oral Q4H PRN Max 3/day    Or    OLANZapine (ZyPREXA) IM injection 2.5 mg  2.5 mg Intramuscular Q4H PRN Max 3/day    OLANZapine (ZyPREXA) tablet 5 mg  5 mg Oral Q3H PRN Max 3/day    Or    OLANZapine (ZyPREXA) IM injection 5 mg  5 mg Intramuscular Q3H PRN Max 3/day    OLANZapine (ZyPREXA) tablet 2.5 mg  2.5 mg Oral Q4H PRN Max 6/day    ondansetron (ZOFRAN-ODT) dispersible tablet 4 mg  4 mg Oral Q6H PRN    pantoprazole (PROTONIX) EC tablet 20 mg  20 mg Oral Early Morning    polyethylene glycol (MIRALAX) packet 17 g  17 g Oral Daily PRN    propranolol (INDERAL) tablet 10 mg  10 mg Oral Q12H KAYLIE    senna-docusate sodium (SENOKOT S) 8.6-50 mg per tablet 1 tablet  1 tablet Oral Daily PRN    senna-docusate sodium (SENOKOT S) 8.6-50 mg per tablet 2 tablet  2 tablet Oral HS    traZODone (DESYREL) tablet 50 mg  50 mg Oral HS PRN    white petrolatum-mineral oil (EUCERIN,HYDROCERIN) cream   Topical TID PRN   .    Risks, benefits and  possible side effects of Medications:   Risks, benefits, and possible side effects of medications explained to patient and patient verbalizes understanding.      Mental Status Evaluation:  Appearance:  age appropriate, casually dressed, and disheveled   Behavior:  psychomotor retardation, superficial and suspicious possessing intermittent eye contact   Speech:  soft and scant paucity of speech   Mood:  euthymic   Affect:  blunted and mood-incongruent   Language sparse   Thought Process:  concrete   Thought Content:  no overt obsessions or delusions although appears somewhat paranoid, negative thinking   Perceptual Disturbances: None although appears internally preoccupied and distracted   Risk Potential: Suicidal Ideations none, Homicidal Ideations none, and Potential for Aggression No   Sensorium:  person, place, and time/date   Cognition:  recent and remote memory grossly intact   Consciousness:  alert and awake    Attention: attention span appeared shorter than expected for age   Insight:  limited   Judgment: limited   Intellect Not assessed   Gait/Station: normal gait/station, normal balance, and slow   Motor Activity: no abnormal movements     Memory: Short and long term memory fair     Progress Toward Goals: Unchanged, as evidenced by their participation (or lack thereof) in individual, social and therapeutic milieu in addition to adherence to their medication regimen.    Recommended Treatment:   See above for assessment and plan.    Inpatient Psychiatric Certification: Based upon physical, mental and social evaluations, I certify that inpatient psychiatric services are medically necessary for this patient for a duration of greater than 2 midnights for the treatment of Schizoaffective disorder, bipolar type (HCC) including psychotropic medication management, participation in the therapeutic milieu and referrals as indicated. Available alternative community resources do not meet the patient's mental health care  needs. I further attest that an established written individualized plan of care has been implemented and is outlined in the patient's medical records.    Counseling/Coordination of Care    Total unit time spent today was greater than 15 minutes. Greater than 50% of total time was spent with the patient and/or patient's relatives and/or coordination of patient's care.     Patient's rights, confidentiality, exceptions to confidentiality, use of electronic medical record including appropriate staff access to medical record regarding behavioral health services and consent to treatment were reviewed.    Note Share:     This note was not shared with the patient due to reasonable likelihood of causing patient harm     This note has been constructed using a voice recognition system.    There may be translation, syntax, or grammatical errors. If you have any questions, please contact the dictating provider.    Jordan Christopher Holter, DO 08/25/24

## 2024-08-25 NOTE — NURSING NOTE
"Patient requesting miralax. Patient states he had a bowel movement yesterday however \"I still feel, you know.\" Patient denied constipation.  "

## 2024-08-26 PROCEDURE — 99232 SBSQ HOSP IP/OBS MODERATE 35: CPT | Performed by: PSYCHIATRY & NEUROLOGY

## 2024-08-26 RX ADMIN — GLYCOPYRROLATE 1 MG: 1 TABLET ORAL at 09:06

## 2024-08-26 RX ADMIN — FAMOTIDINE 20 MG: 20 TABLET ORAL at 09:04

## 2024-08-26 RX ADMIN — NICOTINE POLACRILEX 2 MG: 2 GUM, CHEWING ORAL at 09:07

## 2024-08-26 RX ADMIN — NICOTINE POLACRILEX 2 MG: 2 GUM, CHEWING ORAL at 13:14

## 2024-08-26 RX ADMIN — LORATADINE 10 MG: 10 TABLET ORAL at 09:04

## 2024-08-26 RX ADMIN — FAMOTIDINE 20 MG: 20 TABLET ORAL at 17:07

## 2024-08-26 RX ADMIN — GLYCOPYRROLATE 2 MG: 1 TABLET ORAL at 21:26

## 2024-08-26 RX ADMIN — CYANOCOBALAMIN TAB 1000 MCG 1000 MCG: 1000 TAB at 09:04

## 2024-08-26 RX ADMIN — SENNOSIDES AND DOCUSATE SODIUM 2 TABLET: 8.6; 5 TABLET ORAL at 21:26

## 2024-08-26 RX ADMIN — METFORMIN HYDROCHLORIDE 500 MG: 500 TABLET, FILM COATED ORAL at 09:05

## 2024-08-26 RX ADMIN — NICOTINE POLACRILEX 2 MG: 2 GUM, CHEWING ORAL at 21:27

## 2024-08-26 RX ADMIN — GLYCOPYRROLATE 1 MG: 1 TABLET ORAL at 13:15

## 2024-08-26 RX ADMIN — PANTOPRAZOLE SODIUM 20 MG: 20 TABLET, DELAYED RELEASE ORAL at 06:17

## 2024-08-26 RX ADMIN — METFORMIN HYDROCHLORIDE 500 MG: 500 TABLET, FILM COATED ORAL at 17:06

## 2024-08-26 RX ADMIN — ESCITALOPRAM OXALATE 20 MG: 10 TABLET, FILM COATED ORAL at 09:07

## 2024-08-26 RX ADMIN — NICOTINE POLACRILEX 2 MG: 2 GUM, CHEWING ORAL at 17:07

## 2024-08-26 RX ADMIN — CLOZAPINE 200 MG: 200 TABLET ORAL at 17:06

## 2024-08-26 RX ADMIN — PROPRANOLOL HYDROCHLORIDE 10 MG: 10 TABLET ORAL at 21:26

## 2024-08-26 RX ADMIN — ARIPIPRAZOLE 15 MG: 15 TABLET ORAL at 09:07

## 2024-08-26 RX ADMIN — PROPRANOLOL HYDROCHLORIDE 10 MG: 10 TABLET ORAL at 09:05

## 2024-08-26 RX ADMIN — CLOZAPINE 300 MG: 200 TABLET ORAL at 21:26

## 2024-08-26 RX ADMIN — HYDROCHLOROTHIAZIDE 12.5 MG: 12.5 TABLET ORAL at 09:05

## 2024-08-26 NOTE — NURSING NOTE
Pt is calm, cooperative and visible on unit. Pt reports AH, depression and anxiety; denies SI/HI/VH. Compliant with medications and meals. Visible in groups. No inappropriate behaviors noted. Will maintain q7 min checks.

## 2024-08-26 NOTE — SOCIAL WORK
SW checked in with pt.  Pt reports ongoing AH but reports he's managing it. Pt denied any other concerns.   Pt requested a virtual visit with his brother-in-law. BRANDY set up visit for 6pm 8/26/24 on [facility]

## 2024-08-26 NOTE — PROGRESS NOTES
08/26/24 0800   Team Meeting   Meeting Type Daily Rounds   Team Members Present   Team Members Present Physician;Nurse;;Other (Discipline and Name)   Patient/Family Present   Patient Present No   Patient's Family Present No     In attendance:  Dr. Jordan Holter, DO Daniel Teles, RN  Luisana Alvarado, [facility]    Groups: 7/9    Pt required redirection for behaviors with female peer. Pt reports ongoing AH and depression. No bx issues noted.

## 2024-08-26 NOTE — NURSING NOTE
Alberto maintained on ongoing SAFE precaution without incident on this shift.  Awake, alert , visible, pleasant, and cooperative upon approach.   Attended and participated in 4 out of 7 groups today.  Continues to be compliant with medication regimen and had snack.  No inappropriate contact between him and a female peer noted.  Denies all psych symptoms.

## 2024-08-26 NOTE — PROGRESS NOTES
Psychiatric Progress Note - Department of Behavioral Health   Alberto Berumen 28 y.o. male MRN: [id number]  Unit/Bed#: [facility] 108-02 Encounter: [id number]    ASSESSMENT & PLAN     Diagnoses:   Principal Problem:    Schizoaffective disorder, bipolar type (HCC)  Active Problems:    GERD (gastroesophageal reflux disease)    Medical clearance for psychiatric admission    Tobacco abuse    T wave inversion in EKG    Chronic idiopathic constipation    Confluent and reticulate papillomatosis    Primary hypertension    Elevated hemoglobin A1c    Bilateral lower extremity edema    Treatment Recommendations/Precautions:  Continue to promote patient participation in therapeutic milieu.  Continue medical management per medicine.  Continue previously prescribed psychotropic medication regimen; see below.  Continue behavioral health checks q.7 minutes.   Continue vitals per behavioral health unit protocol.  Discharge date per primary team; 201 commitment status.    SUBJECTIVE     Patient evaluated this a.m. for continuity of care. Patient was discussed at length with nursing and treatment team. Per nursing, patient remains calm, cooperative, appropriately interactive in the milieu, adherent to his medications without any acute adverse effects. No acute distress is noted throughout evaluation. Alberto Berumen denies suicidal/homicidal ideation in addition to thoughts of self-injury, receptive to crisis planning provided by this writer, contacting for safety in the inpatient setting, admitting to an ability to appropriately confide in staff including this writer.  Patient admits to dysphoric mood including depression, stating that this is related to intermittent instances of auditory hallucinations that are negative and derogatory in content regarding death of his relatives, receptive to supportive therapy provided by this writer, denying any additional psychiatric complaint/concerns    PSYCHIATRIC REVIEW OF SYSTEMS     Behavior  over the last 24 hours:  unchanged  Sleep: adequate  Appetite: adequate  Medication side effects: No    REVIEW OF SYSTEMS   Review of systems: no complaints    OBJECTIVE     Vital Signs in Past 24 Hours:  Temp:  [98 °F (36.7 °C)-98.3 °F (36.8 °C)] 98.3 °F (36.8 °C)  HR:  [] 87  Resp:  [18] 18  BP: (118-152)/(63-84) 118/63    Intake/Output in Past 24 hours:  No intake/output data recorded.  No intake/output data recorded.        Laboratory Results:  I have personally reviewed all pertinent laboratory/tests results.  Most Recent Labs:   Lab Results   Component Value Date    WBC 10.25 (H) 08/23/2024    RBC 5.66 (H) 08/23/2024    HGB 13.5 08/23/2024    HCT 44.3 08/23/2024     08/23/2024    RDW 14.2 08/23/2024    NEUTROABS 5.27 08/23/2024    SODIUM 136 06/03/2024    K 3.8 06/03/2024    CL 96 06/03/2024    CO2 29 06/03/2024    BUN 10 06/03/2024    CREATININE 1.00 06/03/2024    GLUC 97 06/03/2024    GLUF 103 (H) 05/29/2024    CALCIUM 10.1 06/03/2024    AST 33 06/03/2024    ALT 72 (H) 06/03/2024    ALKPHOS 80 06/03/2024    TP 8.5 (H) 06/03/2024    ALB 4.7 06/03/2024    TBILI 0.30 06/03/2024    CHOLESTEROL 156 03/14/2024    HDL 44 03/14/2024    TRIG 133 03/14/2024    LDLCALC 85 03/14/2024    NONHDLC 112 03/14/2024    LITHIUM 0.61 01/09/2024    HSQ7EVUPRIDS 1.062 11/15/2023    HGBA1C 5.0 04/01/2024    EAG 97 04/01/2024       Behavioral Health Medications: all current active meds have been reviewed and current meds:   Current Facility-Administered Medications   Medication Dose Route Frequency    acetaminophen (TYLENOL) tablet 650 mg  650 mg Oral Q4H PRN    acetaminophen (TYLENOL) tablet 650 mg  650 mg Oral Q6H PRN    aluminum-magnesium hydroxide-simethicone (MAALOX) oral suspension 30 mL  30 mL Oral Q4H PRN    amLODIPine (NORVASC) tablet 5 mg  5 mg Oral Daily    ARIPiprazole (ABILIFY) tablet 15 mg  15 mg Oral Daily    Artificial Tears ophthalmic solution 1 drop  1 drop Both Eyes Q3H PRN    atropine (ISOPTO  ATROPINE) 1 % ophthalmic solution 1 drop  1 drop Sublingual Daily PRN    haloperidol lactate (HALDOL) injection 2.5 mg  2.5 mg Intramuscular Q4H PRN Max 4/day    And    LORazepam (ATIVAN) injection 1 mg  1 mg Intramuscular Q4H PRN Max 4/day    And    benztropine (COGENTIN) injection 0.5 mg  0.5 mg Intramuscular Q4H PRN Max 4/day    benztropine (COGENTIN) injection 1 mg  1 mg Intramuscular Q4H PRN Max 6/day    haloperidol lactate (HALDOL) injection 5 mg  5 mg Intramuscular Q4H PRN Max 4/day    And    LORazepam (ATIVAN) injection 2 mg  2 mg Intramuscular Q4H PRN Max 4/day    And    benztropine (COGENTIN) injection 1 mg  1 mg Intramuscular Q4H PRN Max 4/day    benztropine (COGENTIN) tablet 1 mg  1 mg Oral Q4H PRN Max 6/day    benztropine (COGENTIN) tablet 1 mg  1 mg Oral Q4H PRN Max 6/day    bisacodyl (DULCOLAX) rectal suppository 10 mg  10 mg Rectal Daily PRN    calcium carbonate (TUMS) chewable tablet 500 mg  500 mg Oral BID PRN    clozapine (CLOZARIL) tablet 200 mg  200 mg Oral Before Dinner    cloZAPine (CLOZARIL) tablet 300 mg  300 mg Oral HS    cyanocobalamin (VITAMIN B-12) tablet 1,000 mcg  1,000 mcg Oral Daily    hydrOXYzine HCL (ATARAX) tablet 50 mg  50 mg Oral Q6H PRN Max 4/day    Or    diphenhydrAMINE (BENADRYL) injection 50 mg  50 mg Intramuscular Q6H PRN    hydrOXYzine HCL (ATARAX) tablet 50 mg  50 mg Oral Q6H PRN Max 4/day    Or    diphenhydrAMINE (BENADRYL) injection 50 mg  50 mg Intramuscular Q6H PRN    diphenhydrAMINE-zinc acetate (BENADRYL) 2-0.1 % cream   Topical BID PRN    escitalopram (LEXAPRO) tablet 20 mg  20 mg Oral Daily    famotidine (PEPCID) tablet 20 mg  20 mg Oral BID    fluticasone (FLONASE) 50 mcg/act nasal spray 1 spray  1 spray Each Nare Daily    glycopyrrolate (ROBINUL) tablet 1 mg  1 mg Oral BID (AM & Afternoon)    glycopyrrolate (ROBINUL) tablet 2 mg  2 mg Oral HS    haloperidol (HALDOL) tablet 1 mg  1 mg Oral Q6H PRN    haloperidol (HALDOL) tablet 2.5 mg  2.5 mg Oral Q4H PRN Max  4/day    haloperidol (HALDOL) tablet 5 mg  5 mg Oral Q4H PRN Max 4/day    hydroCHLOROthiazide tablet 12.5 mg  12.5 mg Oral Daily    hydrocortisone 1 % ointment   Topical 4x Daily PRN    hydrOXYzine HCL (ATARAX) tablet 100 mg  100 mg Oral Q6H PRN Max 4/day    Or    LORazepam (ATIVAN) injection 2 mg  2 mg Intramuscular Q6H PRN    hydrOXYzine HCL (ATARAX) tablet 100 mg  100 mg Oral Q6H PRN Max 4/day    Or    LORazepam (ATIVAN) injection 2 mg  2 mg Intramuscular Q6H PRN    hydrOXYzine HCL (ATARAX) tablet 25 mg  25 mg Oral Q6H PRN Max 4/day    ibuprofen (MOTRIN) tablet 600 mg  600 mg Oral Q8H PRN    lactated ringers infusion  50 mL/hr Intravenous Continuous    loratadine (CLARITIN) tablet 10 mg  10 mg Oral Daily    melatonin tablet 3 mg  3 mg Oral HS PRN    metFORMIN (GLUCOPHAGE) tablet 500 mg  500 mg Oral BID With Meals    methocarbamol (ROBAXIN) tablet 500 mg  500 mg Oral Q6H PRN    nicotine polacrilex (NICORETTE) gum 2 mg  2 mg Oral Q4H PRN    OLANZapine (ZyPREXA) tablet 5 mg  5 mg Oral Q4H PRN Max 3/day    Or    OLANZapine (ZyPREXA) IM injection 2.5 mg  2.5 mg Intramuscular Q4H PRN Max 3/day    OLANZapine (ZyPREXA) tablet 5 mg  5 mg Oral Q3H PRN Max 3/day    Or    OLANZapine (ZyPREXA) IM injection 5 mg  5 mg Intramuscular Q3H PRN Max 3/day    OLANZapine (ZyPREXA) tablet 2.5 mg  2.5 mg Oral Q4H PRN Max 6/day    ondansetron (ZOFRAN-ODT) dispersible tablet 4 mg  4 mg Oral Q6H PRN    pantoprazole (PROTONIX) EC tablet 20 mg  20 mg Oral Early Morning    polyethylene glycol (MIRALAX) packet 17 g  17 g Oral Daily PRN    propranolol (INDERAL) tablet 10 mg  10 mg Oral Q12H KAYLIE    senna-docusate sodium (SENOKOT S) 8.6-50 mg per tablet 1 tablet  1 tablet Oral Daily PRN    senna-docusate sodium (SENOKOT S) 8.6-50 mg per tablet 2 tablet  2 tablet Oral HS    traZODone (DESYREL) tablet 50 mg  50 mg Oral HS PRN    white petrolatum-mineral oil (EUCERIN,HYDROCERIN) cream   Topical TID PRN   .    Risks, benefits and possible side  effects of Medications:   Risks, benefits, and possible side effects of medications explained to patient and patient verbalizes understanding.      Mental Status Evaluation:  Appearance:  age appropriate, casually dressed, disheveled, and marginal grooming/hygiene   Behavior:  psychomotor retardation, superficial, suspicious possessing limited eye contact   Speech:  soft and paucity of speech   Mood:  euthymic   Affect:  blunted and mood-incongruent   Language sparse   Thought Process:  concrete   Thought Content:  Negative thinking   Perceptual Disturbances: Auditory hallucinations without commands appearing internally preoccupied   Risk Potential: Suicidal Ideations none, Homicidal Ideations none, and Potential for Aggression No   Sensorium:  person, place, and time/date   Cognition:  recent and remote memory grossly intact   Consciousness:  alert and awake    Attention: attention span appeared shorter than expected for age   Insight:  limited   Judgment: limited   Intellect Not assessed   Gait/Station: normal gait/station and normal balance   Motor Activity: no abnormal movements     Memory: Short and long term memory  fair     Progress Toward Goals: unchanged, as evidenced by their participation (or lack thereof) in individual, social and therapeutic milieu in addition to adherence to their medication regimen.    Recommended Treatment:   See above for assessment and plan.    Inpatient Psychiatric Certification: Based upon physical, mental and social evaluations, I certify that inpatient psychiatric services are medically necessary for this patient for a duration of greater than 2 midnights for the treatment of Schizoaffective disorder, bipolar type (HCC) including psychotropic medication management, participation in the therapeutic milieu and referrals as indicated. Available alternative community resources do not meet the patient's mental health care needs. I further attest that an established written  individualized plan of care has been implemented and is outlined in the patient's medical records.    Counseling/Coordination of Care    Total unit time spent today was greater than 15 minutes. Greater than 50% of total time was spent with the patient and/or patient's relatives and/or coordination of patient's care.     Patient's rights, confidentiality, exceptions to confidentiality, use of electronic medical record including appropriate staff access to medical record regarding behavioral health services and consent to treatment were reviewed.    Note Share:     This note was not shared with the patient due to reasonable likelihood of causing patient harm     This note has been constructed using a voice recognition system.    There may be translation, syntax, or grammatical errors. If you have any questions, please contact the dictating provider.    Jordan Christopher Holter, DO 08/26/24

## 2024-08-27 PROCEDURE — 99232 SBSQ HOSP IP/OBS MODERATE 35: CPT | Performed by: PSYCHIATRY & NEUROLOGY

## 2024-08-27 RX ADMIN — ARIPIPRAZOLE 15 MG: 15 TABLET ORAL at 08:28

## 2024-08-27 RX ADMIN — NICOTINE POLACRILEX 2 MG: 2 GUM, CHEWING ORAL at 21:37

## 2024-08-27 RX ADMIN — PROPRANOLOL HYDROCHLORIDE 10 MG: 10 TABLET ORAL at 08:32

## 2024-08-27 RX ADMIN — POLYETHYLENE GLYCOL 3350 17 G: 17 POWDER, FOR SOLUTION ORAL at 09:15

## 2024-08-27 RX ADMIN — METFORMIN HYDROCHLORIDE 500 MG: 500 TABLET, FILM COATED ORAL at 17:02

## 2024-08-27 RX ADMIN — GLYCOPYRROLATE 1 MG: 1 TABLET ORAL at 13:42

## 2024-08-27 RX ADMIN — AMLODIPINE BESYLATE 5 MG: 5 TABLET ORAL at 08:30

## 2024-08-27 RX ADMIN — GLYCOPYRROLATE 1 MG: 1 TABLET ORAL at 08:32

## 2024-08-27 RX ADMIN — NICOTINE POLACRILEX 2 MG: 2 GUM, CHEWING ORAL at 08:28

## 2024-08-27 RX ADMIN — CLOZAPINE 200 MG: 200 TABLET ORAL at 17:02

## 2024-08-27 RX ADMIN — CYANOCOBALAMIN TAB 1000 MCG 1000 MCG: 1000 TAB at 08:28

## 2024-08-27 RX ADMIN — FAMOTIDINE 20 MG: 20 TABLET ORAL at 08:30

## 2024-08-27 RX ADMIN — CLOZAPINE 300 MG: 200 TABLET ORAL at 21:13

## 2024-08-27 RX ADMIN — METFORMIN HYDROCHLORIDE 500 MG: 500 TABLET, FILM COATED ORAL at 08:31

## 2024-08-27 RX ADMIN — PANTOPRAZOLE SODIUM 20 MG: 20 TABLET, DELAYED RELEASE ORAL at 06:10

## 2024-08-27 RX ADMIN — FAMOTIDINE 20 MG: 20 TABLET ORAL at 17:02

## 2024-08-27 RX ADMIN — HYDROCHLOROTHIAZIDE 12.5 MG: 12.5 TABLET ORAL at 08:28

## 2024-08-27 RX ADMIN — NICOTINE POLACRILEX 2 MG: 2 GUM, CHEWING ORAL at 17:37

## 2024-08-27 RX ADMIN — LORATADINE 10 MG: 10 TABLET ORAL at 08:28

## 2024-08-27 RX ADMIN — GLYCOPYRROLATE 2 MG: 1 TABLET ORAL at 21:12

## 2024-08-27 RX ADMIN — NICOTINE POLACRILEX 2 MG: 2 GUM, CHEWING ORAL at 13:42

## 2024-08-27 RX ADMIN — ESCITALOPRAM OXALATE 20 MG: 10 TABLET, FILM COATED ORAL at 08:31

## 2024-08-27 RX ADMIN — SENNOSIDES AND DOCUSATE SODIUM 2 TABLET: 8.6; 5 TABLET ORAL at 21:12

## 2024-08-27 NOTE — PLAN OF CARE
Problem: Alteration in Thoughts and Perception  Goal: Verbalize thoughts and feelings  Description: Interventions:  - Promote a nonjudgmental and trusting relationship with the patient through active listening and therapeutic communication  - Assess patient's level of functioning, behavior and potential for risk  - Engage patient in 1 on 1 interactions  - Encourage patient to express fears, feelings, frustrations, and discuss symptoms    - [place] patient to reality, help patient recognize reality-based thinking   - Administer medications as ordered and assess for potential side effects  - Provide the patient education related to the signs and symptoms of the illness and desired effects of prescribed medications  Outcome: Progressing  Goal: Refrain from acting on delusional thinking/internal stimuli  Description: Interventions:  - Monitor patient closely, per order   - Utilize least restrictive measures   - Set reasonable limits, give positive feedback for acceptable   - Administer medications as ordered and monitor of potential side effects  Outcome: Progressing  Goal: Agree to be compliant with medication regime, as prescribed and report medication side effects  Description: Interventions:  - Offer appropriate PRN medication and supervise ingestion; conduct AIMS, as needed   Outcome: Progressing  Goal: Attend and participate in unit activities, including therapeutic, recreational, and educational groups  Description: Interventions:  -Encourage Visitation and family involvement in care  Outcome: Progressing     Problem: Ineffective Coping  Goal: Participates in unit activities  Description: Interventions:  - Provide therapeutic environment   - Provide required programming   - Redirect inappropriate behaviors   Outcome: Progressing     Problem: Depression  Goal: Refrain from isolation  Description: Interventions:  - Develop a trusting relationship   - Encourage socialization   Outcome: Progressing     Problem:  Anxiety  Goal: Anxiety is at manageable level  Description: Interventions:  - Assess and monitor patient's anxiety level.   - Monitor for signs and symptoms (heart palpitations, chest pain, shortness of breath, headaches, nausea, feeling jumpy, restlessness, irritable, apprehensive).   - Collaborate with interdisciplinary team and initiate plan and interventions as ordered.  - [place] patient to unit/surroundings  - Explain treatment plan  - Encourage participation in care  - Encourage verbalization of concerns/fears  - Identify coping mechanisms  - Assist in developing anxiety-reducing skills  - Administer/offer alternative therapies  - Limit or eliminate stimulants  Outcome: Progressing     Problem: Alteration in Orientation  Goal: Interact with staff daily  Description: Interventions:  - Assess and re-assess patient's level of orientation  - Engage patient in 1 on 1 interactions, daily, for a minimum of 15 minutes   - Establish rapport/trust with patient   Outcome: Progressing

## 2024-08-27 NOTE — PROGRESS NOTES
08/27/24 0754   Team Meeting   Meeting Type Daily Rounds   Team Members Present   Team Members Present Physician;Nurse;;Other (Discipline and Name)   Patient/Family Present   Patient Present No   Patient's Family Present No     In attendance:  Dr. Jordan Holter, DO Guy Taylor, RN  Luisana Alvarado, [facility]  Irais Mcdowell M.S.    Groups: 5/9    Pt had virtual visit with brother that went well; no bx issues noted. Pt appropriate with female peer; reports ongoing AH. Pt on CRR waitlist.

## 2024-08-27 NOTE — NURSING NOTE
Pt visible on unit. Pt reports AH, depression and anxiety. Med/meal compliant. No inappropriate behaviors. Will maintain q7 min checks.

## 2024-08-27 NOTE — NURSING NOTE
Alberto is visible on the unit. He is quiet and does not say much other than the voices are as they ususally are: threatening his family with harm. He has some limited anxiety.  He had a virtual visit with brother in law at 1830 and it went well  He was calm pleasant

## 2024-08-27 NOTE — NURSING NOTE
Pt is visible on the unit and social with select peers. Consumed 100% of dinner. Took medications without incidence, held Inderal for BP parameters. Pt is pleasant and cooperative. Attended 3/9 groups. Reports anxiety, depression, and AH that threaten to kill him and his family. Denies SI/HI/VH. No behavioral issues. Pt offers no complaints. Continuous safety checks maintained.

## 2024-08-27 NOTE — PROGRESS NOTES
Psychiatric Progress Note - Department of Behavioral Health   Alberto Berumen 28 y.o. male MRN: [id number]  Unit/Bed#: [facility] 108-02 Encounter: [id number]    ASSESSMENT & PLAN     Diagnoses:   Principal Problem:    Schizoaffective disorder, bipolar type (HCC)  Active Problems:    GERD (gastroesophageal reflux disease)    Medical clearance for psychiatric admission    Tobacco abuse    T wave inversion in EKG    Chronic idiopathic constipation    Confluent and reticulate papillomatosis    Primary hypertension    Elevated hemoglobin A1c    Bilateral lower extremity edema      Treatment Recommendations/Precautions:  Continue to promote patient participation in therapeutic milieu.  Continue medical management per medicine.  Continue previously prescribed psychotropic medication regimen; see below.  Continue behavioral health checks q.7 minutes.   Continue vitals per behavioral health unit protocol.  Discharge date per primary team    SUBJECTIVE     Patient evaluated this a.m. for continuity of care. Patient was discussed at length with nursing and treatment team. Per nursing, patient remains calm, cooperative, intermittently interactive in the milieu, adherent to his medications less any acute adverse effects. No acute distress is noted throughout evaluation. Alberto Berumen denies suicidal/homicidal ideation in addition to thoughts of self-injury, receptive to crisis planning provided by this writer, contacting for safety in the inpatient setting, admitting to an ability to appropriately confide in staff including this writer. Patient remains scant and sparse, admitting to depressed mood accompanied by auditory hallucinations that are negative and derogatory relating to his relatives, pending linkage with appropriate outpatient psychiatric resources    PSYCHIATRIC REVIEW OF SYSTEMS     Behavior over the last 24 hours:  unchanged  Sleep: adequate  Appetite: adequate  Medication side effects: No    REVIEW OF SYSTEMS    Review of systems: no complaints    OBJECTIVE     Vital Signs in Past 24 Hours:  Temp:  [97.5 °F (36.4 °C)-98.3 °F (36.8 °C)] 97.5 °F (36.4 °C)  HR:  [] 105  Resp:  [18] 18  BP: (118-143)/(63-90) 132/84    Intake/Output in Past 24 hours:  No intake/output data recorded.  No intake/output data recorded.        Laboratory Results:  I have personally reviewed all pertinent laboratory/tests results.  Most Recent Labs:   Lab Results   Component Value Date    WBC 10.25 (H) 08/23/2024    RBC 5.66 (H) 08/23/2024    HGB 13.5 08/23/2024    HCT 44.3 08/23/2024     08/23/2024    RDW 14.2 08/23/2024    NEUTROABS 5.27 08/23/2024    SODIUM 136 06/03/2024    K 3.8 06/03/2024    CL 96 06/03/2024    CO2 29 06/03/2024    BUN 10 06/03/2024    CREATININE 1.00 06/03/2024    GLUC 97 06/03/2024    GLUF 103 (H) 05/29/2024    CALCIUM 10.1 06/03/2024    AST 33 06/03/2024    ALT 72 (H) 06/03/2024    ALKPHOS 80 06/03/2024    TP 8.5 (H) 06/03/2024    ALB 4.7 06/03/2024    TBILI 0.30 06/03/2024    CHOLESTEROL 156 03/14/2024    HDL 44 03/14/2024    TRIG 133 03/14/2024    LDLCALC 85 03/14/2024    NONHDLC 112 03/14/2024    LITHIUM 0.61 01/09/2024    [id number] 1.062 11/15/2023    HGBA1C 5.0 04/01/2024    EAG 97 04/01/2024       Behavioral Health Medications: all current active meds have been reviewed and current meds:   Current Facility-Administered Medications   Medication Dose Route Frequency    acetaminophen (TYLENOL) tablet 650 mg  650 mg Oral Q4H PRN    acetaminophen (TYLENOL) tablet 650 mg  650 mg Oral Q6H PRN    aluminum-magnesium hydroxide-simethicone (MAALOX) oral suspension 30 mL  30 mL Oral Q4H PRN    amLODIPine (NORVASC) tablet 5 mg  5 mg Oral Daily    ARIPiprazole (ABILIFY) tablet 15 mg  15 mg Oral Daily    Artificial Tears ophthalmic solution 1 drop  1 drop Both Eyes Q3H PRN    atropine (ISOPTO ATROPINE) 1 % ophthalmic solution 1 drop  1 drop Sublingual Daily PRN    haloperidol lactate (HALDOL) injection 2.5 mg  2.5 mg  Intramuscular Q4H PRN Max 4/day    And    LORazepam (ATIVAN) injection 1 mg  1 mg Intramuscular Q4H PRN Max 4/day    And    benztropine (COGENTIN) injection 0.5 mg  0.5 mg Intramuscular Q4H PRN Max 4/day    benztropine (COGENTIN) injection 1 mg  1 mg Intramuscular Q4H PRN Max 6/day    haloperidol lactate (HALDOL) injection 5 mg  5 mg Intramuscular Q4H PRN Max 4/day    And    LORazepam (ATIVAN) injection 2 mg  2 mg Intramuscular Q4H PRN Max 4/day    And    benztropine (COGENTIN) injection 1 mg  1 mg Intramuscular Q4H PRN Max 4/day    benztropine (COGENTIN) tablet 1 mg  1 mg Oral Q4H PRN Max 6/day    benztropine (COGENTIN) tablet 1 mg  1 mg Oral Q4H PRN Max 6/day    bisacodyl (DULCOLAX) rectal suppository 10 mg  10 mg Rectal Daily PRN    calcium carbonate (TUMS) chewable tablet 500 mg  500 mg Oral BID PRN    clozapine (CLOZARIL) tablet 200 mg  200 mg Oral Before Dinner    cloZAPine (CLOZARIL) tablet 300 mg  300 mg Oral HS    cyanocobalamin (VITAMIN B-12) tablet 1,000 mcg  1,000 mcg Oral Daily    hydrOXYzine HCL (ATARAX) tablet 50 mg  50 mg Oral Q6H PRN Max 4/day    Or    diphenhydrAMINE (BENADRYL) injection 50 mg  50 mg Intramuscular Q6H PRN    hydrOXYzine HCL (ATARAX) tablet 50 mg  50 mg Oral Q6H PRN Max 4/day    Or    diphenhydrAMINE (BENADRYL) injection 50 mg  50 mg Intramuscular Q6H PRN    diphenhydrAMINE-zinc acetate (BENADRYL) 2-0.1 % cream   Topical BID PRN    escitalopram (LEXAPRO) tablet 20 mg  20 mg Oral Daily    famotidine (PEPCID) tablet 20 mg  20 mg Oral BID    fluticasone (FLONASE) 50 mcg/act nasal spray 1 spray  1 spray Each Nare Daily    glycopyrrolate (ROBINUL) tablet 1 mg  1 mg Oral BID (AM & Afternoon)    glycopyrrolate (ROBINUL) tablet 2 mg  2 mg Oral HS    haloperidol (HALDOL) tablet 1 mg  1 mg Oral Q6H PRN    haloperidol (HALDOL) tablet 2.5 mg  2.5 mg Oral Q4H PRN Max 4/day    haloperidol (HALDOL) tablet 5 mg  5 mg Oral Q4H PRN Max 4/day    hydroCHLOROthiazide tablet 12.5 mg  12.5 mg Oral Daily     hydrocortisone 1 % ointment   Topical 4x Daily PRN    hydrOXYzine HCL (ATARAX) tablet 100 mg  100 mg Oral Q6H PRN Max 4/day    Or    LORazepam (ATIVAN) injection 2 mg  2 mg Intramuscular Q6H PRN    hydrOXYzine HCL (ATARAX) tablet 100 mg  100 mg Oral Q6H PRN Max 4/day    Or    LORazepam (ATIVAN) injection 2 mg  2 mg Intramuscular Q6H PRN    hydrOXYzine HCL (ATARAX) tablet 25 mg  25 mg Oral Q6H PRN Max 4/day    ibuprofen (MOTRIN) tablet 600 mg  600 mg Oral Q8H PRN    lactated ringers infusion  50 mL/hr Intravenous Continuous    loratadine (CLARITIN) tablet 10 mg  10 mg Oral Daily    melatonin tablet 3 mg  3 mg Oral HS PRN    metFORMIN (GLUCOPHAGE) tablet 500 mg  500 mg Oral BID With Meals    methocarbamol (ROBAXIN) tablet 500 mg  500 mg Oral Q6H PRN    nicotine polacrilex (NICORETTE) gum 2 mg  2 mg Oral Q4H PRN    OLANZapine (ZyPREXA) tablet 5 mg  5 mg Oral Q4H PRN Max 3/day    Or    OLANZapine (ZyPREXA) IM injection 2.5 mg  2.5 mg Intramuscular Q4H PRN Max 3/day    OLANZapine (ZyPREXA) tablet 5 mg  5 mg Oral Q3H PRN Max 3/day    Or    OLANZapine (ZyPREXA) IM injection 5 mg  5 mg Intramuscular Q3H PRN Max 3/day    OLANZapine (ZyPREXA) tablet 2.5 mg  2.5 mg Oral Q4H PRN Max 6/day    ondansetron (ZOFRAN-ODT) dispersible tablet 4 mg  4 mg Oral Q6H PRN    pantoprazole (PROTONIX) EC tablet 20 mg  20 mg Oral Early Morning    polyethylene glycol (MIRALAX) packet 17 g  17 g Oral Daily PRN    propranolol (INDERAL) tablet 10 mg  10 mg Oral Q12H KAYLIE    senna-docusate sodium (SENOKOT S) 8.6-50 mg per tablet 1 tablet  1 tablet Oral Daily PRN    senna-docusate sodium (SENOKOT S) 8.6-50 mg per tablet 2 tablet  2 tablet Oral HS    traZODone (DESYREL) tablet 50 mg  50 mg Oral HS PRN    white petrolatum-mineral oil (EUCERIN,HYDROCERIN) cream   Topical TID PRN   .    Risks, benefits and possible side effects of Medications:   Risks, benefits, and possible side effects of medications explained to patient and patient verbalizes  understanding.      Mental Status Evaluation:  Appearance:  age appropriate, bearded, casually dressed, and disheveled   Behavior:  psychomotor retardation, superficial and suspicious   Speech:  soft and scant   Mood:  depressed and dysthymic   Affect:  flat and mood-congruent   Language sparse   Thought Process:  concrete   Thought Content:  Negative thinking, paranoid/persecutory delusions   Perceptual Disturbances: Auditory hallucinations without commands   Risk Potential: Suicidal Ideations none, Homicidal Ideations none, and Potential for Aggression No   Sensorium:  person, place, and time/date   Cognition:  recent and remote memory grossly intact   Consciousness:  alert and awake    Attention: attention span appeared shorter than expected for age   Insight:  limited   Judgment: limited   Intellect Not assessed   Gait/Station: normal gait/station and normal balance   Motor Activity: no abnormal movements     Memory: Short and long term memory  fair     Progress Toward Goals: unchanged, as evidenced by their participation (or lack thereof) in individual, social and therapeutic milieu in addition to adherence to their medication regimen.    Recommended Treatment:   See above for assessment and plan.    Inpatient Psychiatric Certification: Based upon physical, mental and social evaluations, I certify that inpatient psychiatric services are medically necessary for this patient for a duration of greater than 2 midnights for the treatment of Schizoaffective disorder, bipolar type (HCC) including psychotropic medication management, participation in the therapeutic milieu and referrals as indicated. Available alternative community resources do not meet the patient's mental health care needs. I further attest that an established written individualized plan of care has been implemented and is outlined in the patient's medical records.    Counseling/Coordination of Care    Total unit time spent today was greater than 15  minutes. Greater than 50% of total time was spent with the patient and/or patient's relatives and/or coordination of patient's care.     Patient's rights, confidentiality, exceptions to confidentiality, use of electronic medical record including appropriate staff access to medical record regarding behavioral health services and consent to treatment were reviewed.    Note Share:     This note was not shared with the patient due to reasonable likelihood of causing patient harm     This note has been constructed using a voice recognition system.    There may be translation, syntax, or grammatical errors. If you have any questions, please contact the dictating provider.    Jordan Christopher Holter, DO 08/27/24

## 2024-08-28 PROCEDURE — 99232 SBSQ HOSP IP/OBS MODERATE 35: CPT | Performed by: PSYCHIATRY & NEUROLOGY

## 2024-08-28 RX ORDER — PANTOPRAZOLE SODIUM 20 MG/1
20 TABLET, DELAYED RELEASE ORAL EVERY MORNING
Status: DISCONTINUED | OUTPATIENT
Start: 2024-08-29 | End: 2025-03-14 | Stop reason: HOSPADM

## 2024-08-28 RX ADMIN — METFORMIN HYDROCHLORIDE 500 MG: 500 TABLET, FILM COATED ORAL at 17:08

## 2024-08-28 RX ADMIN — GLYCOPYRROLATE 2 MG: 1 TABLET ORAL at 21:16

## 2024-08-28 RX ADMIN — CYANOCOBALAMIN TAB 1000 MCG 1000 MCG: 1000 TAB at 08:40

## 2024-08-28 RX ADMIN — NICOTINE POLACRILEX 2 MG: 2 GUM, CHEWING ORAL at 21:40

## 2024-08-28 RX ADMIN — METFORMIN HYDROCHLORIDE 500 MG: 500 TABLET, FILM COATED ORAL at 08:39

## 2024-08-28 RX ADMIN — GLYCOPYRROLATE 1 MG: 1 TABLET ORAL at 13:37

## 2024-08-28 RX ADMIN — GLYCOPYRROLATE 1 MG: 1 TABLET ORAL at 08:40

## 2024-08-28 RX ADMIN — FAMOTIDINE 20 MG: 20 TABLET ORAL at 17:08

## 2024-08-28 RX ADMIN — NICOTINE POLACRILEX 2 MG: 2 GUM, CHEWING ORAL at 17:37

## 2024-08-28 RX ADMIN — CLOZAPINE 300 MG: 200 TABLET ORAL at 21:16

## 2024-08-28 RX ADMIN — HYDROCHLOROTHIAZIDE 12.5 MG: 12.5 TABLET ORAL at 08:40

## 2024-08-28 RX ADMIN — ONDANSETRON 4 MG: 4 TABLET, ORALLY DISINTEGRATING ORAL at 15:48

## 2024-08-28 RX ADMIN — ARIPIPRAZOLE 15 MG: 15 TABLET ORAL at 08:40

## 2024-08-28 RX ADMIN — CLOZAPINE 200 MG: 200 TABLET ORAL at 17:08

## 2024-08-28 RX ADMIN — NICOTINE POLACRILEX 2 MG: 2 GUM, CHEWING ORAL at 08:39

## 2024-08-28 RX ADMIN — SENNOSIDES AND DOCUSATE SODIUM 2 TABLET: 8.6; 5 TABLET ORAL at 21:16

## 2024-08-28 RX ADMIN — AMLODIPINE BESYLATE 5 MG: 5 TABLET ORAL at 08:40

## 2024-08-28 RX ADMIN — NICOTINE POLACRILEX 2 MG: 2 GUM, CHEWING ORAL at 13:37

## 2024-08-28 RX ADMIN — PANTOPRAZOLE SODIUM 20 MG: 20 TABLET, DELAYED RELEASE ORAL at 06:40

## 2024-08-28 RX ADMIN — PROPRANOLOL HYDROCHLORIDE 10 MG: 10 TABLET ORAL at 08:39

## 2024-08-28 RX ADMIN — ESCITALOPRAM OXALATE 20 MG: 10 TABLET, FILM COATED ORAL at 08:40

## 2024-08-28 RX ADMIN — PROPRANOLOL HYDROCHLORIDE 10 MG: 10 TABLET ORAL at 21:16

## 2024-08-28 RX ADMIN — FAMOTIDINE 20 MG: 20 TABLET ORAL at 08:40

## 2024-08-28 RX ADMIN — LORATADINE 10 MG: 10 TABLET ORAL at 08:39

## 2024-08-28 NOTE — NURSING NOTE
"Pt is accepting of medications without incidence and meal compliant. Pt is visible in the milieu at times pacing, social with select few peers or resting in bed. Pt endorses  stating \" they're the same no better\". Reports depression and anxiety, but denies all other s/s. Attends groups and makes needs known. No new concerns.   "

## 2024-08-28 NOTE — PLAN OF CARE
Problem: Ineffective Coping  Goal: Identifies ineffective coping skills  Outcome: Not Progressing  Goal: Identifies healthy coping skills  Outcome: Progressing  Goal: Demonstrates healthy coping skills  Outcome: Not Progressing  Goal: Participates in unit activities  Description: Interventions:  - Provide therapeutic environment   - Provide required programming   - Redirect inappropriate behaviors   Outcome: Progressing

## 2024-08-28 NOTE — PLAN OF CARE
Problem: Alteration in Thoughts and Perception  Goal: Agree to be compliant with medication regime, as prescribed and report medication side effects  Description: Interventions:  - Offer appropriate PRN medication and supervise ingestion; conduct AIMS, as needed   Outcome: Progressing  Goal: Attend and participate in unit activities, including therapeutic, recreational, and educational groups  Description: Interventions:  -Encourage Visitation and family involvement in care  Outcome: Progressing  Goal: Complete daily ADLs, including personal hygiene independently, as able  Description: Interventions:  - Observe, teach, and assist patient with ADLS  - Monitor and promote a balance of rest/activity, with adequate nutrition and elimination   Outcome: Progressing     Problem: Ineffective Coping  Goal: Demonstrates healthy coping skills  Outcome: Progressing  Goal: Participates in unit activities  Description: Interventions:  - Provide therapeutic environment   - Provide required programming   - Redirect inappropriate behaviors   Outcome: Progressing     Problem: Depression  Goal: Refrain from isolation  Description: Interventions:  - Develop a trusting relationship   - Encourage socialization   Outcome: Progressing  Goal: Refrain from self-neglect  Outcome: Progressing     Problem: Anxiety  Goal: Anxiety is at manageable level  Description: Interventions:  - Assess and monitor patient's anxiety level.   - Monitor for signs and symptoms (heart palpitations, chest pain, shortness of breath, headaches, nausea, feeling jumpy, restlessness, irritable, apprehensive).   - Collaborate with interdisciplinary team and initiate plan and interventions as ordered.  - [place] patient to unit/surroundings  - Explain treatment plan  - Encourage participation in care  - Encourage verbalization of concerns/fears  - Identify coping mechanisms  - Assist in developing anxiety-reducing skills  - Administer/offer alternative therapies  -  Limit or eliminate stimulants  Outcome: Progressing     Problem: Alteration in Orientation  Goal: Interact with staff daily  Description: Interventions:  - Assess and re-assess patient's level of orientation  - Engage patient in 1 on 1 interactions, daily, for a minimum of 15 minutes   - Establish rapport/trust with patient   Outcome: Progressing

## 2024-08-28 NOTE — PLAN OF CARE
Problem: Alteration in Thoughts and Perception  Goal: Verbalize thoughts and feelings  Description: Interventions:  - Promote a nonjudgmental and trusting relationship with the patient through active listening and therapeutic communication  - Assess patient's level of functioning, behavior and potential for risk  - Engage patient in 1 on 1 interactions  - Encourage patient to express fears, feelings, frustrations, and discuss symptoms    - [place] patient to reality, help patient recognize reality-based thinking   - Administer medications as ordered and assess for potential side effects  - Provide the patient education related to the signs and symptoms of the illness and desired effects of prescribed medications  Outcome: Progressing  Goal: Refrain from acting on delusional thinking/internal stimuli  Description: Interventions:  - Monitor patient closely, per order   - Utilize least restrictive measures   - Set reasonable limits, give positive feedback for acceptable   - Administer medications as ordered and monitor of potential side effects  Outcome: Progressing  Goal: Agree to be compliant with medication regime, as prescribed and report medication side effects  Description: Interventions:  - Offer appropriate PRN medication and supervise ingestion; conduct AIMS, as needed   8/28/2024 0056 by Carlie Hadley RN  Outcome: Progressing  8/28/2024 0055 by Carlie Hadley RN  Outcome: Progressing  Goal: Attend and participate in unit activities, including therapeutic, recreational, and educational groups  Description: Interventions:  -Encourage Visitation and family involvement in care  Outcome: Progressing  Goal: Complete daily ADLs, including personal hygiene independently, as able  Description: Interventions:  - Observe, teach, and assist patient with ADLS  - Monitor and promote a balance of rest/activity, with adequate nutrition and elimination   Outcome: Progressing     Problem: Ineffective Coping  Goal:  Identifies healthy coping skills  Outcome: Progressing  Goal: Demonstrates healthy coping skills  Outcome: Progressing  Goal: Participates in unit activities  Description: Interventions:  - Provide therapeutic environment   - Provide required programming   - Redirect inappropriate behaviors   Outcome: Progressing     Problem: Depression  Goal: Verbalize thoughts and feelings  Description: Interventions:  - Assess and re-assess patient's level of risk   - Engage patient in 1:1 interactions, daily, for a minimum of 15 minutes   - Encourage patient to express feelings, fears, frustrations, hopes   Outcome: Progressing  Goal: Refrain from harming self  Description: Interventions:  - Monitor patient closely, per order   - Supervise medication ingestion, monitor effects and side effects   Outcome: Progressing  Goal: Refrain from isolation  Description: Interventions:  - Develop a trusting relationship   - Encourage socialization   Outcome: Progressing  Goal: Refrain from self-neglect  Outcome: Progressing     Problem: Anxiety  Goal: Anxiety is at manageable level  Description: Interventions:  - Assess and monitor patient's anxiety level.   - Monitor for signs and symptoms (heart palpitations, chest pain, shortness of breath, headaches, nausea, feeling jumpy, restlessness, irritable, apprehensive).   - Collaborate with interdisciplinary team and initiate plan and interventions as ordered.  - [place] patient to unit/surroundings  - Explain treatment plan  - Encourage participation in care  - Encourage verbalization of concerns/fears  - Identify coping mechanisms  - Assist in developing anxiety-reducing skills  - Administer/offer alternative therapies  - Limit or eliminate stimulants  Outcome: Progressing     Problem: Alteration in Orientation  Goal: Interact with staff daily  Description: Interventions:  - Assess and re-assess patient's level of orientation  - Engage patient in 1 on 1 interactions, daily, for a minimum of 15  minutes   - Establish rapport/trust with patient   Outcome: Progressing

## 2024-08-28 NOTE — PROGRESS NOTES
Psychiatric Progress Note - Department of Behavioral Health   Alberto Berumen 28 y.o. male MRN: [id number]  Unit/Bed#: [facility] 108-02 Encounter: [id number]    ASSESSMENT & PLAN     Diagnoses:   Principal Problem:    Schizoaffective disorder, bipolar type (HCC)  Active Problems:    GERD (gastroesophageal reflux disease)    Medical clearance for psychiatric admission    Tobacco abuse    T wave inversion in EKG    Chronic idiopathic constipation    Confluent and reticulate papillomatosis    Primary hypertension    Elevated hemoglobin A1c    Bilateral lower extremity edema      Treatment Recommendations/Precautions:  Continue to promote patient participation in therapeutic milieu.  Continue medical management per medicine.  Continue previously prescribed psychotropic medication regimen; see below.  Continue behavioral health checks q.7 minutes.   Continue vitals per behavioral health unit protocol.  Discharge date per primary team; 201 commitment status.    SUBJECTIVE     Patient evaluated this a.m. for continuity of care. Patient was discussed at length with nursing and treatment team. Per nursing, patient means calm, cooperative, intermittently interactive in the milieu, inherent to his medications without any acute adverse effects. No acute distress is noted throughout evaluation. Alberto Berumen denies suicidal/homicidal ideation in addition to thoughts of self-injury, receptive to crisis planning provided by this writer, contacting for safety in the inpatient setting, admitting to an ability to appropriately confide in staff including this writer.  Patient admits to dysphoric mood including depression in addition to intermittent instances of negative and derogatory auditory hallucinations relating to his relatives, appearing receptive to supportive therapy provided by this writer, remaining somewhat scant and sparse in interaction, awaiting appropriate outpatient psychiatric resources to assure appropriate  management in the outpatient setting    PSYCHIATRIC REVIEW OF SYSTEMS     Behavior over the last 24 hours: unchanged  Sleep: adequate  Appetite: adequate  Medication side effects: No    REVIEW OF SYSTEMS   Review of systems: no complaints    OBJECTIVE     Vital Signs in Past 24 Hours:  Temp:  [97.5 °F (36.4 °C)-97.6 °F (36.4 °C)] 97.6 °F (36.4 °C)  HR:  [98] 98  Resp:  [18] 18  BP: ()/(56-82) 125/80    Intake/Output in Past 24 hours:  No intake/output data recorded.  No intake/output data recorded.        Laboratory Results:  I have personally reviewed all pertinent laboratory/tests results.  Most Recent Labs:   Lab Results   Component Value Date    WBC 10.25 (H) 08/23/2024    RBC 5.66 (H) 08/23/2024    HGB 13.5 08/23/2024    HCT 44.3 08/23/2024     08/23/2024    RDW 14.2 08/23/2024    NEUTROABS 5.27 08/23/2024    SODIUM 136 06/03/2024    K 3.8 06/03/2024    CL 96 06/03/2024    CO2 29 06/03/2024    BUN 10 06/03/2024    CREATININE 1.00 06/03/2024    GLUC 97 06/03/2024    GLUF 103 (H) 05/29/2024    CALCIUM 10.1 06/03/2024    AST 33 06/03/2024    ALT 72 (H) 06/03/2024    ALKPHOS 80 06/03/2024    TP 8.5 (H) 06/03/2024    ALB 4.7 06/03/2024    TBILI 0.30 06/03/2024    CHOLESTEROL 156 03/14/2024    HDL 44 03/14/2024    TRIG 133 03/14/2024    LDLCALC 85 03/14/2024    NONHDLC 112 03/14/2024    LITHIUM 0.61 01/09/2024    [id number] 1.062 11/15/2023    HGBA1C 5.0 04/01/2024    EAG 97 04/01/2024       Behavioral Health Medications: all current active meds have been reviewed and current meds:   Current Facility-Administered Medications   Medication Dose Route Frequency    acetaminophen (TYLENOL) tablet 650 mg  650 mg Oral Q4H PRN    acetaminophen (TYLENOL) tablet 650 mg  650 mg Oral Q6H PRN    aluminum-magnesium hydroxide-simethicone (MAALOX) oral suspension 30 mL  30 mL Oral Q4H PRN    amLODIPine (NORVASC) tablet 5 mg  5 mg Oral Daily    ARIPiprazole (ABILIFY) tablet 15 mg  15 mg Oral Daily    Artificial Tears  ophthalmic solution 1 drop  1 drop Both Eyes Q3H PRN    atropine (ISOPTO ATROPINE) 1 % ophthalmic solution 1 drop  1 drop Sublingual Daily PRN    haloperidol lactate (HALDOL) injection 2.5 mg  2.5 mg Intramuscular Q4H PRN Max 4/day    And    LORazepam (ATIVAN) injection 1 mg  1 mg Intramuscular Q4H PRN Max 4/day    And    benztropine (COGENTIN) injection 0.5 mg  0.5 mg Intramuscular Q4H PRN Max 4/day    benztropine (COGENTIN) injection 1 mg  1 mg Intramuscular Q4H PRN Max 6/day    haloperidol lactate (HALDOL) injection 5 mg  5 mg Intramuscular Q4H PRN Max 4/day    And    LORazepam (ATIVAN) injection 2 mg  2 mg Intramuscular Q4H PRN Max 4/day    And    benztropine (COGENTIN) injection 1 mg  1 mg Intramuscular Q4H PRN Max 4/day    benztropine (COGENTIN) tablet 1 mg  1 mg Oral Q4H PRN Max 6/day    benztropine (COGENTIN) tablet 1 mg  1 mg Oral Q4H PRN Max 6/day    bisacodyl (DULCOLAX) rectal suppository 10 mg  10 mg Rectal Daily PRN    calcium carbonate (TUMS) chewable tablet 500 mg  500 mg Oral BID PRN    clozapine (CLOZARIL) tablet 200 mg  200 mg Oral Before Dinner    cloZAPine (CLOZARIL) tablet 300 mg  300 mg Oral HS    cyanocobalamin (VITAMIN B-12) tablet 1,000 mcg  1,000 mcg Oral Daily    hydrOXYzine HCL (ATARAX) tablet 50 mg  50 mg Oral Q6H PRN Max 4/day    Or    diphenhydrAMINE (BENADRYL) injection 50 mg  50 mg Intramuscular Q6H PRN    hydrOXYzine HCL (ATARAX) tablet 50 mg  50 mg Oral Q6H PRN Max 4/day    Or    diphenhydrAMINE (BENADRYL) injection 50 mg  50 mg Intramuscular Q6H PRN    diphenhydrAMINE-zinc acetate (BENADRYL) 2-0.1 % cream   Topical BID PRN    escitalopram (LEXAPRO) tablet 20 mg  20 mg Oral Daily    famotidine (PEPCID) tablet 20 mg  20 mg Oral BID    fluticasone (FLONASE) 50 mcg/act nasal spray 1 spray  1 spray Each Nare Daily    glycopyrrolate (ROBINUL) tablet 1 mg  1 mg Oral BID (AM & Afternoon)    glycopyrrolate (ROBINUL) tablet 2 mg  2 mg Oral HS    haloperidol (HALDOL) tablet 1 mg  1 mg Oral  Q6H PRN    haloperidol (HALDOL) tablet 2.5 mg  2.5 mg Oral Q4H PRN Max 4/day    haloperidol (HALDOL) tablet 5 mg  5 mg Oral Q4H PRN Max 4/day    hydroCHLOROthiazide tablet 12.5 mg  12.5 mg Oral Daily    hydrocortisone 1 % ointment   Topical 4x Daily PRN    hydrOXYzine HCL (ATARAX) tablet 100 mg  100 mg Oral Q6H PRN Max 4/day    Or    LORazepam (ATIVAN) injection 2 mg  2 mg Intramuscular Q6H PRN    hydrOXYzine HCL (ATARAX) tablet 100 mg  100 mg Oral Q6H PRN Max 4/day    Or    LORazepam (ATIVAN) injection 2 mg  2 mg Intramuscular Q6H PRN    hydrOXYzine HCL (ATARAX) tablet 25 mg  25 mg Oral Q6H PRN Max 4/day    ibuprofen (MOTRIN) tablet 600 mg  600 mg Oral Q8H PRN    lactated ringers infusion  50 mL/hr Intravenous Continuous    loratadine (CLARITIN) tablet 10 mg  10 mg Oral Daily    melatonin tablet 3 mg  3 mg Oral HS PRN    metFORMIN (GLUCOPHAGE) tablet 500 mg  500 mg Oral BID With Meals    methocarbamol (ROBAXIN) tablet 500 mg  500 mg Oral Q6H PRN    nicotine polacrilex (NICORETTE) gum 2 mg  2 mg Oral Q4H PRN    OLANZapine (ZyPREXA) tablet 5 mg  5 mg Oral Q4H PRN Max 3/day    Or    OLANZapine (ZyPREXA) IM injection 2.5 mg  2.5 mg Intramuscular Q4H PRN Max 3/day    OLANZapine (ZyPREXA) tablet 5 mg  5 mg Oral Q3H PRN Max 3/day    Or    OLANZapine (ZyPREXA) IM injection 5 mg  5 mg Intramuscular Q3H PRN Max 3/day    OLANZapine (ZyPREXA) tablet 2.5 mg  2.5 mg Oral Q4H PRN Max 6/day    ondansetron (ZOFRAN-ODT) dispersible tablet 4 mg  4 mg Oral Q6H PRN    pantoprazole (PROTONIX) EC tablet 20 mg  20 mg Oral QAM    polyethylene glycol (MIRALAX) packet 17 g  17 g Oral Daily PRN    propranolol (INDERAL) tablet 10 mg  10 mg Oral Q12H KAYLIE    senna-docusate sodium (SENOKOT S) 8.6-50 mg per tablet 1 tablet  1 tablet Oral Daily PRN    senna-docusate sodium (SENOKOT S) 8.6-50 mg per tablet 2 tablet  2 tablet Oral HS    traZODone (DESYREL) tablet 50 mg  50 mg Oral HS PRN    white petrolatum-mineral oil (EUCERIN,HYDROCERIN) cream    Topical TID PRN   .    Risks, benefits and possible side effects of Medications:   Risks, benefits, and possible side effects of medications explained to patient and patient verbalizes understanding.      Mental Status Evaluation:  Appearance:  age appropriate, bearded, casually dressed, and somewhat disheveled   Behavior:  psychomotor retardation, superficial, somewhat suspicious   Speech:  soft and scant possessing slight paucity of speech   Mood:  depressed and dysthymic   Affect:  flat and mood-congruent   Language sparse   Thought Process:  concrete   Thought Content:  delusions  persecutory, negative thinking   Perceptual Disturbances: Auditory hallucinations without commands remaining internally preoccupied and distracted at times   Risk Potential: Suicidal Ideations none, Homicidal Ideations none, and Potential for Aggression No   Sensorium:  person, place, and time/date   Cognition:  recent and remote memory grossly intact   Consciousness:  alert and awake    Attention: attention span appeared shorter than expected for age   Insight:  limited   Judgment: limited   Intellect Not assessed   Gait/Station: normal gait/station and normal balance   Motor Activity: no abnormal movements     Memory: Short and long term memory fair     Progress Toward Goals: Unchanged, as evidenced by their participation (or lack thereof) in individual, social and therapeutic milieu in addition to adherence to their medication regimen.    Recommended Treatment:   See above for assessment and plan.    Inpatient Psychiatric Certification: Based upon physical, mental and social evaluations, I certify that inpatient psychiatric services are medically necessary for this patient for a duration of greater than 2 midnights for the treatment of Schizoaffective disorder, bipolar type (HCC) including psychotropic medication management, participation in the therapeutic milieu and referrals as indicated. Available alternative community resources  do not meet the patient's mental health care needs. I further attest that an established written individualized plan of care has been implemented and is outlined in the patient's medical records.    Counseling/Coordination of Care    Total unit time spent today was greater than 15 minutes. Greater than 50% of total time was spent with the patient and/or patient's relatives and/or coordination of patient's care.     Patient's rights, confidentiality, exceptions to confidentiality, use of electronic medical record including appropriate staff access to medical record regarding behavioral health services and consent to treatment were reviewed.    Note Share:     This note was not shared with the patient due to reasonable likelihood of causing patient harm     This note has been constructed using a voice recognition system.    There may be translation, syntax, or grammatical errors. If you have any questions, please contact the dictating provider.    Jordan Christopher Holter, DO 08/28/24

## 2024-08-28 NOTE — PROGRESS NOTES
08/28/24 0801   Team Meeting   Meeting Type Daily Rounds   Team Members Present   Team Members Present Physician;Nurse;;Other (Discipline and Name)   Patient/Family Present   Patient Present No   Patient's Family Present No     In attendance:  Dr. Jordan Holter, DO Daniel Teles, RN  Luisana Alvarado, [facility]  Irais Mcdowell M.S.    Groups: 3/9    Pt social with select peers; appropriate with female peer. Pt reported some depression and anxiety; reports ongoing AH. Pt waits on CRR availability.

## 2024-08-28 NOTE — NURSING NOTE
Alberto reported feeling depressed today, but denied feeling anxious.  Reported that he hears voices everyday.  They are still telling him that they are going to kill him and his family.   Pt c/o feeling nauseous.  Ginger ale and Zofran were administered 5 min apart at 1548.  Pt reported that he had some relief.     Pt is visible, social, pleasant and cooperative.  Meal and med compliant, consumed 25% of dinner.  Requested gum throughout the shift.  No unmet needs.  No behavioral issues noted or reported.

## 2024-08-29 LAB
BASOPHILS # BLD AUTO: 0.05 THOUSANDS/ÂΜL (ref 0–0.1)
BASOPHILS NFR BLD AUTO: 0 % (ref 0–1)
BNP SERPL-MCNC: 17 PG/ML (ref 0–100)
CK SERPL-CCNC: 154 U/L (ref 39–308)
CLOZAPINE SERPL-MCNC: 663 NG/ML (ref 350–900)
CRP SERPL QL: 8.8 MG/L
EOSINOPHIL # BLD AUTO: 0.71 THOUSAND/ÂΜL (ref 0–0.61)
EOSINOPHIL NFR BLD AUTO: 6 % (ref 0–6)
ERYTHROCYTE [DISTWIDTH] IN BLOOD BY AUTOMATED COUNT: 14.3 % (ref 11.6–15.1)
HCT VFR BLD AUTO: 42 % (ref 36.5–49.3)
HGB BLD-MCNC: 12.9 G/DL (ref 12–17)
IMM GRANULOCYTES # BLD AUTO: 0.05 THOUSAND/UL (ref 0–0.2)
IMM GRANULOCYTES NFR BLD AUTO: 0 % (ref 0–2)
LYMPHOCYTES # BLD AUTO: 3.49 THOUSANDS/ÂΜL (ref 0.6–4.47)
LYMPHOCYTES NFR BLD AUTO: 30 % (ref 14–44)
MCH RBC QN AUTO: 24.2 PG (ref 26.8–34.3)
MCHC RBC AUTO-ENTMCNC: 30.7 G/DL (ref 31.4–37.4)
MCV RBC AUTO: 79 FL (ref 82–98)
MONOCYTES # BLD AUTO: 0.89 THOUSAND/ÂΜL (ref 0.17–1.22)
MONOCYTES NFR BLD AUTO: 8 % (ref 4–12)
NEUTROPHILS # BLD AUTO: 6.36 THOUSANDS/ÂΜL (ref 1.85–7.62)
NEUTS SEG NFR BLD AUTO: 56 % (ref 43–75)
NRBC BLD AUTO-RTO: 0 /100 WBCS
PLATELET # BLD AUTO: 246 THOUSANDS/UL (ref 149–390)
PMV BLD AUTO: 10.5 FL (ref 8.9–12.7)
RBC # BLD AUTO: 5.32 MILLION/UL (ref 3.88–5.62)
WBC # BLD AUTO: 11.55 THOUSAND/UL (ref 4.31–10.16)

## 2024-08-29 PROCEDURE — 82550 ASSAY OF CK (CPK): CPT

## 2024-08-29 PROCEDURE — 80159 DRUG ASSAY CLOZAPINE: CPT | Performed by: PSYCHIATRY & NEUROLOGY

## 2024-08-29 PROCEDURE — 83880 ASSAY OF NATRIURETIC PEPTIDE: CPT

## 2024-08-29 PROCEDURE — 99232 SBSQ HOSP IP/OBS MODERATE 35: CPT | Performed by: PSYCHIATRY & NEUROLOGY

## 2024-08-29 PROCEDURE — 85025 COMPLETE CBC W/AUTO DIFF WBC: CPT

## 2024-08-29 PROCEDURE — 86140 C-REACTIVE PROTEIN: CPT

## 2024-08-29 RX ADMIN — FLUTICASONE PROPIONATE 1 SPRAY: 50 SPRAY, METERED NASAL at 08:30

## 2024-08-29 RX ADMIN — NICOTINE POLACRILEX 2 MG: 2 GUM, CHEWING ORAL at 08:30

## 2024-08-29 RX ADMIN — CLOZAPINE 300 MG: 200 TABLET ORAL at 21:16

## 2024-08-29 RX ADMIN — CLOZAPINE 200 MG: 200 TABLET ORAL at 16:25

## 2024-08-29 RX ADMIN — FAMOTIDINE 20 MG: 20 TABLET ORAL at 17:36

## 2024-08-29 RX ADMIN — NICOTINE POLACRILEX 2 MG: 2 GUM, CHEWING ORAL at 21:30

## 2024-08-29 RX ADMIN — PROPRANOLOL HYDROCHLORIDE 10 MG: 10 TABLET ORAL at 21:17

## 2024-08-29 RX ADMIN — LORATADINE 10 MG: 10 TABLET ORAL at 08:29

## 2024-08-29 RX ADMIN — PANTOPRAZOLE SODIUM 20 MG: 20 TABLET, DELAYED RELEASE ORAL at 08:30

## 2024-08-29 RX ADMIN — GLYCOPYRROLATE 2 MG: 1 TABLET ORAL at 21:17

## 2024-08-29 RX ADMIN — METFORMIN HYDROCHLORIDE 500 MG: 500 TABLET, FILM COATED ORAL at 08:29

## 2024-08-29 RX ADMIN — FAMOTIDINE 20 MG: 20 TABLET ORAL at 08:30

## 2024-08-29 RX ADMIN — NICOTINE POLACRILEX 2 MG: 2 GUM, CHEWING ORAL at 17:36

## 2024-08-29 RX ADMIN — ESCITALOPRAM OXALATE 20 MG: 10 TABLET, FILM COATED ORAL at 08:29

## 2024-08-29 RX ADMIN — ARIPIPRAZOLE 15 MG: 15 TABLET ORAL at 08:29

## 2024-08-29 RX ADMIN — NICOTINE POLACRILEX 2 MG: 2 GUM, CHEWING ORAL at 13:58

## 2024-08-29 RX ADMIN — GLYCOPYRROLATE 1 MG: 1 TABLET ORAL at 08:30

## 2024-08-29 RX ADMIN — CYANOCOBALAMIN TAB 1000 MCG 1000 MCG: 1000 TAB at 08:30

## 2024-08-29 RX ADMIN — AMLODIPINE BESYLATE 5 MG: 5 TABLET ORAL at 08:30

## 2024-08-29 RX ADMIN — PROPRANOLOL HYDROCHLORIDE 10 MG: 10 TABLET ORAL at 08:29

## 2024-08-29 RX ADMIN — GLYCOPYRROLATE 1 MG: 1 TABLET ORAL at 13:58

## 2024-08-29 RX ADMIN — SENNOSIDES AND DOCUSATE SODIUM 2 TABLET: 8.6; 5 TABLET ORAL at 21:16

## 2024-08-29 RX ADMIN — METFORMIN HYDROCHLORIDE 500 MG: 500 TABLET, FILM COATED ORAL at 16:25

## 2024-08-29 RX ADMIN — HYDROCHLOROTHIAZIDE 12.5 MG: 12.5 TABLET ORAL at 08:29

## 2024-08-29 NOTE — NURSING NOTE
"Alberto was calm and cooperative during assessment. He reported medium depression and anxiety due to AH stating they would, \"kill me and my whole family.\" He states that when the AH is bothersome, he walks as a distraction coping skill. He reports that walking helps alleviate his stress and depressive mood effectively. This RN discussed when to ask for as needed medications if AH become unmanageable with coping skills.  He denies SI, SH, and VH at time of assessment. He reports adequate, uninterrupted sleep devoid of nightmares last night. Alberto did not attend group programming this shift and laid in bed in his room. He is accepting of medications and attended and ate meals. He denies having a goal today and denies new concerns.   "

## 2024-08-29 NOTE — PROGRESS NOTES
08/29/24 0751   Team Meeting   Meeting Type Daily Rounds   Team Members Present   Team Members Present Physician;Nurse;;Other (Discipline and Name)   Patient/Family Present   Patient Present No   Patient's Family Present No     In attendance:  Dr. Jordan Holter, DO Guy Taylor, RN  Luisana Alvarado, [facility]  Irais Mcdowell M.S.    Groups: 4/9    Pt visible, social, denies symptoms other than ongoing AH that he reports are unchanged. Pt reported nausea in the afternoon, received Zofran and nausea.

## 2024-08-29 NOTE — SOCIAL WORK
SW and pt met 1:1  Pt requested additional virtual visit with his brother in law. SW reviewed pt's current symptoms and assessed presentation. Pt reports no changes. Pt visible but disengaged overall today. Pt denied other concerns at this time. Pt still is not fully on board with CRR referral but understands there are not other options if returning to family is not available.

## 2024-08-29 NOTE — PLAN OF CARE
Problem: Alteration in Thoughts and Perception  Goal: Refrain from acting on delusional thinking/internal stimuli  Description: Interventions:  - Monitor patient closely, per order   - Utilize least restrictive measures   - Set reasonable limits, give positive feedback for acceptable   - Administer medications as ordered and monitor of potential side effects  Outcome: Progressing  Goal: Agree to be compliant with medication regime, as prescribed and report medication side effects  Description: Interventions:  - Offer appropriate PRN medication and supervise ingestion; conduct AIMS, as needed   Outcome: Progressing  Goal: Attend and participate in unit activities, including therapeutic, recreational, and educational groups  Description: Interventions:  -Encourage Visitation and family involvement in care  Outcome: Progressing  Goal: Recognize dysfunctional thoughts, communicate reality-based thoughts at the time of discharge  Description: Interventions:  - Provide medication and psycho-education to assist patient in compliance and developing insight into his/her illness   Outcome: Progressing     Problem: Ineffective Coping  Goal: Identifies ineffective coping skills  Outcome: Progressing  Goal: Identifies healthy coping skills  Outcome: Progressing     Problem: Anxiety  Goal: Anxiety is at manageable level  Description: Interventions:  - Assess and monitor patient's anxiety level.   - Monitor for signs and symptoms (heart palpitations, chest pain, shortness of breath, headaches, nausea, feeling jumpy, restlessness, irritable, apprehensive).   - Collaborate with interdisciplinary team and initiate plan and interventions as ordered.  - [place] patient to unit/surroundings  - Explain treatment plan  - Encourage participation in care  - Encourage verbalization of concerns/fears  - Identify coping mechanisms  - Assist in developing anxiety-reducing skills  - Administer/offer alternative therapies  - Limit or eliminate  stimulants  Outcome: Progressing     Problem: Alteration in Thoughts and Perception  Goal: Treatment Goal: Gain control of psychotic behaviors/thinking, reduce/eliminate presenting symptoms and demonstrate improved reality functioning upon discharge  Outcome: Not Progressing     Problem: Depression  Goal: Treatment Goal: Demonstrate behavioral control of depressive symptoms, verbalize feelings of improved mood/affect, and adopt new coping skills prior to discharge  Outcome: Not Progressing

## 2024-08-29 NOTE — PROGRESS NOTES
Psychiatric Progress Note - Department of Behavioral Health   Alberto Berumen 28 y.o. male MRN: [id number]  Unit/Bed#: [facility] 108-02 Encounter: [id number]    ASSESSMENT & PLAN     Diagnoses:   Principal Problem:    Schizoaffective disorder, bipolar type (HCC)  Active Problems:    GERD (gastroesophageal reflux disease)    Medical clearance for psychiatric admission    Tobacco abuse    T wave inversion in EKG    Chronic idiopathic constipation    Confluent and reticulate papillomatosis    Primary hypertension    Elevated hemoglobin A1c    Bilateral lower extremity edema      Treatment Recommendations/Precautions:  Continue to promote patient participation in therapeutic milieu.  Continue medical management per medicine.  Continue previously prescribed psychotropic medication regimen; see below.  Continue behavioral health checks q.7 minutes.   Continue vitals per behavioral health unit protocol.  Discharge date per primary team; 201 commitment status.    SUBJECTIVE     Patient evaluated this a.m. for continuity of care. Patient was discussed at length with nursing and treatment team. Per nursing, patient remains calm and cooperative, intermittently interactive in the milieu, adherent to his medications without any acute adverse effects. No acute distress is noted throughout evaluation. Alberto Berumen denies suicidal/homicidal ideation in addition to thoughts of self-injury, receptive to crisis planning provided by this writer, contacting for safety in the inpatient setting, admitting to an ability to appropriately confide in staff including this writer.  Patient admits to depressed mood, stating that previous auditory hallucinations that are negative and derogatory in content are unchanged in comparison to previous evaluation, pending linkage with outpatient psychiatric resources    PSYCHIATRIC REVIEW OF SYSTEMS     Behavior over the last 24 hours: Unchanged  Sleep: Adequate  Appetite: Adequate  Medication side  effects: None    REVIEW OF SYSTEMS   Review of systems: No complaints    OBJECTIVE     Vital Signs in Past 24 Hours:  Temp:  [96.8 °F (36 °C)-98 °F (36.7 °C)] 96.8 °F (36 °C)  HR:  [] 99  Resp:  [18] 18  BP: (141-150)/(74-89) 145/75    Intake/Output in Past 24 hours:  No intake/output data recorded.  No intake/output data recorded.        Laboratory Results:  I have personally reviewed all pertinent laboratory/tests results.  Most Recent Labs:   Lab Results   Component Value Date    WBC 11.55 (H) 08/29/2024    RBC 5.32 08/29/2024    HGB 12.9 08/29/2024    HCT 42.0 08/29/2024     08/29/2024    RDW 14.3 08/29/2024    NEUTROABS 6.36 08/29/2024    SODIUM 136 06/03/2024    K 3.8 06/03/2024    CL 96 06/03/2024    CO2 29 06/03/2024    BUN 10 06/03/2024    CREATININE 1.00 06/03/2024    GLUC 97 06/03/2024    GLUF 103 (H) 05/29/2024    CALCIUM 10.1 06/03/2024    AST 33 06/03/2024    ALT 72 (H) 06/03/2024    ALKPHOS 80 06/03/2024    TP 8.5 (H) 06/03/2024    ALB 4.7 06/03/2024    TBILI 0.30 06/03/2024    CHOLESTEROL 156 03/14/2024    HDL 44 03/14/2024    TRIG 133 03/14/2024    LDLCALC 85 03/14/2024    NONHDLC 112 03/14/2024    LITHIUM 0.61 01/09/2024    [id number] 1.062 11/15/2023    HGBA1C 5.0 04/01/2024    EAG 97 04/01/2024       Behavioral Health Medications: all current active meds have been reviewed and current meds:   Current Facility-Administered Medications   Medication Dose Route Frequency    acetaminophen (TYLENOL) tablet 650 mg  650 mg Oral Q4H PRN    acetaminophen (TYLENOL) tablet 650 mg  650 mg Oral Q6H PRN    aluminum-magnesium hydroxide-simethicone (MAALOX) oral suspension 30 mL  30 mL Oral Q4H PRN    amLODIPine (NORVASC) tablet 5 mg  5 mg Oral Daily    ARIPiprazole (ABILIFY) tablet 15 mg  15 mg Oral Daily    Artificial Tears ophthalmic solution 1 drop  1 drop Both Eyes Q3H PRN    atropine (ISOPTO ATROPINE) 1 % ophthalmic solution 1 drop  1 drop Sublingual Daily PRN    haloperidol lactate (HALDOL)  injection 2.5 mg  2.5 mg Intramuscular Q4H PRN Max 4/day    And    LORazepam (ATIVAN) injection 1 mg  1 mg Intramuscular Q4H PRN Max 4/day    And    benztropine (COGENTIN) injection 0.5 mg  0.5 mg Intramuscular Q4H PRN Max 4/day    benztropine (COGENTIN) injection 1 mg  1 mg Intramuscular Q4H PRN Max 6/day    haloperidol lactate (HALDOL) injection 5 mg  5 mg Intramuscular Q4H PRN Max 4/day    And    LORazepam (ATIVAN) injection 2 mg  2 mg Intramuscular Q4H PRN Max 4/day    And    benztropine (COGENTIN) injection 1 mg  1 mg Intramuscular Q4H PRN Max 4/day    benztropine (COGENTIN) tablet 1 mg  1 mg Oral Q4H PRN Max 6/day    benztropine (COGENTIN) tablet 1 mg  1 mg Oral Q4H PRN Max 6/day    bisacodyl (DULCOLAX) rectal suppository 10 mg  10 mg Rectal Daily PRN    calcium carbonate (TUMS) chewable tablet 500 mg  500 mg Oral BID PRN    clozapine (CLOZARIL) tablet 200 mg  200 mg Oral Before Dinner    cloZAPine (CLOZARIL) tablet 300 mg  300 mg Oral HS    cyanocobalamin (VITAMIN B-12) tablet 1,000 mcg  1,000 mcg Oral Daily    hydrOXYzine HCL (ATARAX) tablet 50 mg  50 mg Oral Q6H PRN Max 4/day    Or    diphenhydrAMINE (BENADRYL) injection 50 mg  50 mg Intramuscular Q6H PRN    hydrOXYzine HCL (ATARAX) tablet 50 mg  50 mg Oral Q6H PRN Max 4/day    Or    diphenhydrAMINE (BENADRYL) injection 50 mg  50 mg Intramuscular Q6H PRN    diphenhydrAMINE-zinc acetate (BENADRYL) 2-0.1 % cream   Topical BID PRN    escitalopram (LEXAPRO) tablet 20 mg  20 mg Oral Daily    famotidine (PEPCID) tablet 20 mg  20 mg Oral BID    fluticasone (FLONASE) 50 mcg/act nasal spray 1 spray  1 spray Each Nare Daily    glycopyrrolate (ROBINUL) tablet 1 mg  1 mg Oral BID (AM & Afternoon)    glycopyrrolate (ROBINUL) tablet 2 mg  2 mg Oral HS    haloperidol (HALDOL) tablet 1 mg  1 mg Oral Q6H PRN    haloperidol (HALDOL) tablet 2.5 mg  2.5 mg Oral Q4H PRN Max 4/day    haloperidol (HALDOL) tablet 5 mg  5 mg Oral Q4H PRN Max 4/day    hydroCHLOROthiazide tablet 12.5  mg  12.5 mg Oral Daily    hydrocortisone 1 % ointment   Topical 4x Daily PRN    hydrOXYzine HCL (ATARAX) tablet 100 mg  100 mg Oral Q6H PRN Max 4/day    Or    LORazepam (ATIVAN) injection 2 mg  2 mg Intramuscular Q6H PRN    hydrOXYzine HCL (ATARAX) tablet 100 mg  100 mg Oral Q6H PRN Max 4/day    Or    LORazepam (ATIVAN) injection 2 mg  2 mg Intramuscular Q6H PRN    hydrOXYzine HCL (ATARAX) tablet 25 mg  25 mg Oral Q6H PRN Max 4/day    ibuprofen (MOTRIN) tablet 600 mg  600 mg Oral Q8H PRN    lactated ringers infusion  50 mL/hr Intravenous Continuous    loratadine (CLARITIN) tablet 10 mg  10 mg Oral Daily    melatonin tablet 3 mg  3 mg Oral HS PRN    metFORMIN (GLUCOPHAGE) tablet 500 mg  500 mg Oral BID With Meals    methocarbamol (ROBAXIN) tablet 500 mg  500 mg Oral Q6H PRN    nicotine polacrilex (NICORETTE) gum 2 mg  2 mg Oral Q4H PRN    OLANZapine (ZyPREXA) tablet 5 mg  5 mg Oral Q4H PRN Max 3/day    Or    OLANZapine (ZyPREXA) IM injection 2.5 mg  2.5 mg Intramuscular Q4H PRN Max 3/day    OLANZapine (ZyPREXA) tablet 5 mg  5 mg Oral Q3H PRN Max 3/day    Or    OLANZapine (ZyPREXA) IM injection 5 mg  5 mg Intramuscular Q3H PRN Max 3/day    OLANZapine (ZyPREXA) tablet 2.5 mg  2.5 mg Oral Q4H PRN Max 6/day    ondansetron (ZOFRAN-ODT) dispersible tablet 4 mg  4 mg Oral Q6H PRN    pantoprazole (PROTONIX) EC tablet 20 mg  20 mg Oral QAM    polyethylene glycol (MIRALAX) packet 17 g  17 g Oral Daily PRN    propranolol (INDERAL) tablet 10 mg  10 mg Oral Q12H KAYLIE    senna-docusate sodium (SENOKOT S) 8.6-50 mg per tablet 1 tablet  1 tablet Oral Daily PRN    senna-docusate sodium (SENOKOT S) 8.6-50 mg per tablet 2 tablet  2 tablet Oral HS    traZODone (DESYREL) tablet 50 mg  50 mg Oral HS PRN    white petrolatum-mineral oil (EUCERIN,HYDROCERIN) cream   Topical TID PRN   .    Risks, benefits and possible side effects of Medications:   Risks, benefits, and possible side effects of medications explained to patient and patient  verbalizes understanding.      Mental Status Evaluation:  Appearance:  age appropriate, casually dressed, and disheveled   Behavior:  psychomotor retardation, superficial, somewhat suspicious   Speech:  soft and scant   Mood:  depressed and dysthymic   Affect:  flat and mood-congruent   Language sparse   Thought Process:  concrete   Thought Content:  Negative thinking   Perceptual Disturbances: Auditory hallucinations without commands remaining internally preoccupied and distracted   Risk Potential: Suicidal Ideations none, Homicidal Ideations none, and Potential for Aggression No   Sensorium:  person, place, and time/date   Cognition:  recent and remote memory grossly intact   Consciousness:  alert and awake    Attention: attention span appeared shorter than expected for age   Insight:  limited   Judgment: limited   Intellect Not assessed   Gait/Station: normal gait/station and normal balance   Motor Activity: no abnormal movements     Memory: Short and long term memory fair     Progress Toward Goals: Unchanged, as evidenced by their participation (or lack thereof) in individual, social and therapeutic milieu in addition to adherence to their medication regimen.    Recommended Treatment:   See above for assessment and plan.    Inpatient Psychiatric Certification: Based upon physical, mental and social evaluations, I certify that inpatient psychiatric services are medically necessary for this patient for a duration of greater than 2 midnights for the treatment of Schizoaffective disorder, bipolar type (HCC) including psychotropic medication management, participation in the therapeutic milieu and referrals as indicated. Available alternative community resources do not meet the patient's mental health care needs. I further attest that an established written individualized plan of care has been implemented and is outlined in the patient's medical records.    Counseling/Coordination of Care    Total unit time spent  today was greater than 15 minutes. Greater than 50% of total time was spent with the patient and/or patient's relatives and/or coordination of patient's care.     Patient's rights, confidentiality, exceptions to confidentiality, use of electronic medical record including appropriate staff access to medical record regarding behavioral health services and consent to treatment were reviewed.    Note Share:     This note was not shared with the patient due to reasonable likelihood of causing patient harm     This note has been constructed using a voice recognition system.    There may be translation, syntax, or grammatical errors. If you have any questions, please contact the dictating provider.    Jordan Christopher Holter, DO 08/29/24

## 2024-08-30 ENCOUNTER — ANESTHESIA EVENT (INPATIENT)
Dept: PREOP/PACU | Facility: HOSPITAL | Age: 29
DRG: 750 | End: 2024-08-30
Payer: COMMERCIAL

## 2024-08-30 ENCOUNTER — ANESTHESIA (INPATIENT)
Dept: PREOP/PACU | Facility: HOSPITAL | Age: 29
DRG: 750 | End: 2024-08-30
Payer: COMMERCIAL

## 2024-08-30 ENCOUNTER — APPOINTMENT (INPATIENT)
Dept: PREOP/PACU | Facility: HOSPITAL | Age: 29
DRG: 750 | End: 2024-08-30
Attending: PSYCHIATRY & NEUROLOGY
Payer: COMMERCIAL

## 2024-08-30 PROCEDURE — 99232 SBSQ HOSP IP/OBS MODERATE 35: CPT | Performed by: PSYCHIATRY & NEUROLOGY

## 2024-08-30 PROCEDURE — 90870 ELECTROCONVULSIVE THERAPY: CPT

## 2024-08-30 PROCEDURE — GZB2ZZZ ELECTROCONVULSIVE THERAPY, BILATERAL-SINGLE SEIZURE: ICD-10-PCS | Performed by: PSYCHIATRY & NEUROLOGY

## 2024-08-30 PROCEDURE — 90870 ELECTROCONVULSIVE THERAPY: CPT | Performed by: PSYCHIATRY & NEUROLOGY

## 2024-08-30 RX ORDER — SODIUM CHLORIDE, SODIUM LACTATE, POTASSIUM CHLORIDE, CALCIUM CHLORIDE 600; 310; 30; 20 MG/100ML; MG/100ML; MG/100ML; MG/100ML
INJECTION, SOLUTION INTRAVENOUS CONTINUOUS PRN
Status: DISCONTINUED | OUTPATIENT
Start: 2024-08-30 | End: 2024-08-30

## 2024-08-30 RX ORDER — SUCCINYLCHOLINE/SOD CL,ISO/PF 100 MG/5ML
SYRINGE (ML) INTRAVENOUS AS NEEDED
Status: DISCONTINUED | OUTPATIENT
Start: 2024-08-30 | End: 2024-08-30

## 2024-08-30 RX ORDER — KETOROLAC TROMETHAMINE 30 MG/ML
INJECTION, SOLUTION INTRAMUSCULAR; INTRAVENOUS AS NEEDED
Status: DISCONTINUED | OUTPATIENT
Start: 2024-08-30 | End: 2024-08-30

## 2024-08-30 RX ORDER — HYDRALAZINE HYDROCHLORIDE 20 MG/ML
INJECTION INTRAMUSCULAR; INTRAVENOUS AS NEEDED
Status: DISCONTINUED | OUTPATIENT
Start: 2024-08-30 | End: 2024-08-30

## 2024-08-30 RX ORDER — LABETALOL HYDROCHLORIDE 5 MG/ML
INJECTION, SOLUTION INTRAVENOUS AS NEEDED
Status: DISCONTINUED | OUTPATIENT
Start: 2024-08-30 | End: 2024-08-30

## 2024-08-30 RX ORDER — GLYCOPYRROLATE 0.2 MG/ML
INJECTION INTRAMUSCULAR; INTRAVENOUS AS NEEDED
Status: DISCONTINUED | OUTPATIENT
Start: 2024-08-30 | End: 2024-08-30

## 2024-08-30 RX ORDER — ETOMIDATE 2 MG/ML
INJECTION INTRAVENOUS AS NEEDED
Status: DISCONTINUED | OUTPATIENT
Start: 2024-08-30 | End: 2024-08-30

## 2024-08-30 RX ADMIN — PROPRANOLOL HYDROCHLORIDE 10 MG: 10 TABLET ORAL at 21:02

## 2024-08-30 RX ADMIN — GLYCOPYRROLATE 1 MG: 1 TABLET ORAL at 15:17

## 2024-08-30 RX ADMIN — FAMOTIDINE 20 MG: 20 TABLET ORAL at 09:05

## 2024-08-30 RX ADMIN — ETOMIDATE INJECTION 20 MG: 2 SOLUTION INTRAVENOUS at 07:07

## 2024-08-30 RX ADMIN — ARIPIPRAZOLE 15 MG: 15 TABLET ORAL at 09:05

## 2024-08-30 RX ADMIN — SENNOSIDES AND DOCUSATE SODIUM 2 TABLET: 8.6; 5 TABLET ORAL at 21:01

## 2024-08-30 RX ADMIN — LORATADINE 10 MG: 10 TABLET ORAL at 09:05

## 2024-08-30 RX ADMIN — Medication 140 MG: at 07:07

## 2024-08-30 RX ADMIN — HYDROCHLOROTHIAZIDE 12.5 MG: 12.5 TABLET ORAL at 09:04

## 2024-08-30 RX ADMIN — CLOZAPINE 200 MG: 200 TABLET ORAL at 17:01

## 2024-08-30 RX ADMIN — NICOTINE POLACRILEX 2 MG: 2 GUM, CHEWING ORAL at 16:54

## 2024-08-30 RX ADMIN — FLUTICASONE PROPIONATE 1 SPRAY: 50 SPRAY, METERED NASAL at 09:08

## 2024-08-30 RX ADMIN — CLOZAPINE 300 MG: 200 TABLET ORAL at 21:02

## 2024-08-30 RX ADMIN — CYANOCOBALAMIN TAB 1000 MCG 1000 MCG: 1000 TAB at 09:05

## 2024-08-30 RX ADMIN — GLYCOPYRROLATE 0.4 MG: 0.2 INJECTION INTRAMUSCULAR; INTRAVENOUS at 07:03

## 2024-08-30 RX ADMIN — KETOROLAC TROMETHAMINE 30 MG: 30 INJECTION, SOLUTION INTRAMUSCULAR; INTRAVENOUS at 07:14

## 2024-08-30 RX ADMIN — PROPRANOLOL HYDROCHLORIDE 10 MG: 10 TABLET ORAL at 09:11

## 2024-08-30 RX ADMIN — PANTOPRAZOLE SODIUM 20 MG: 20 TABLET, DELAYED RELEASE ORAL at 09:05

## 2024-08-30 RX ADMIN — METFORMIN HYDROCHLORIDE 500 MG: 500 TABLET, FILM COATED ORAL at 17:01

## 2024-08-30 RX ADMIN — ESCITALOPRAM OXALATE 20 MG: 10 TABLET, FILM COATED ORAL at 09:05

## 2024-08-30 RX ADMIN — LABETALOL HYDROCHLORIDE 10 MG: 5 INJECTION, SOLUTION INTRAVENOUS at 07:07

## 2024-08-30 RX ADMIN — NICOTINE POLACRILEX 2 MG: 2 GUM, CHEWING ORAL at 12:43

## 2024-08-30 RX ADMIN — SODIUM CHLORIDE, SODIUM LACTATE, POTASSIUM CHLORIDE, AND CALCIUM CHLORIDE: .6; .31; .03; .02 INJECTION, SOLUTION INTRAVENOUS at 06:18

## 2024-08-30 RX ADMIN — METFORMIN HYDROCHLORIDE 500 MG: 500 TABLET, FILM COATED ORAL at 09:05

## 2024-08-30 RX ADMIN — HYDRALAZINE HYDROCHLORIDE 20 MG: 20 INJECTION INTRAMUSCULAR; INTRAVENOUS at 07:08

## 2024-08-30 RX ADMIN — GLYCOPYRROLATE 2 MG: 1 TABLET ORAL at 21:02

## 2024-08-30 RX ADMIN — NICOTINE POLACRILEX 2 MG: 2 GUM, CHEWING ORAL at 21:02

## 2024-08-30 RX ADMIN — FAMOTIDINE 20 MG: 20 TABLET ORAL at 17:01

## 2024-08-30 RX ADMIN — GLYCOPYRROLATE 1 MG: 1 TABLET ORAL at 09:06

## 2024-08-30 NOTE — ANESTHESIA PREPROCEDURE EVALUATION
Procedure:  ELECTROCONVULSIVE THERAPY (ECT)    Relevant Problems   CARDIO   (+) Primary hypertension      GI/HEPATIC   (+) GERD (gastroesophageal reflux disease)        Physical Exam    Airway    Mallampati score: III  TM Distance: >3 FB  Neck ROM: full     Dental   No notable dental hx     Cardiovascular      Pulmonary      Other Findings        Anesthesia Plan  ASA Score- 3     Anesthesia Type- general with ASA Monitors.         Additional Monitors:     Airway Plan:            Plan Factors-Exercise tolerance (METS): >4 METS.    Chart reviewed.    Patient summary reviewed.    Patient is not a current smoker.  Patient did not smoke on day of surgery.            Induction- intravenous.    Postoperative Plan-     Perioperative Resuscitation Plan - Level 1 - Full Code.       Informed Consent- Anesthetic plan and risks discussed with patient.  I personally reviewed this patient with the CRNA. Discussed and agreed on the Anesthesia Plan with the CRNA..

## 2024-08-30 NOTE — PROGRESS NOTES
Psychiatric Progress Note - Department of Behavioral Health   Alberto Berumen 28 y.o. male MRN: [id number]  Unit/Bed#: [facility] 108-02 Encounter: [id number]    ASSESSMENT & PLAN     Diagnoses:   Principal Problem:    Schizoaffective disorder, bipolar type (HCC)  Active Problems:    GERD (gastroesophageal reflux disease)    Medical clearance for psychiatric admission    Tobacco abuse    T wave inversion in EKG    Chronic idiopathic constipation    Confluent and reticulate papillomatosis    Primary hypertension    Elevated hemoglobin A1c    Bilateral lower extremity edema      Treatment Recommendations/Precautions:  Continue to promote patient participation in therapeutic milieu.  Continue medical management per medicine.  Continue previously prescribed psychotropic medication regimen; see below.  Continue behavioral health checks q.7 minutes.   Continue vitals per behavioral health unit protocol.  Discharge date per primary team; 201 commitment status.    SUBJECTIVE     Patient evaluated this a.m. for continuity of care. Patient was discussed at length with nursing and treatment team. Per nursing, patient remains calm, cooperative, although isolative in the milieu, adherent to his medications less any acute adverse effects, appearing to have tolerated ECT without incident . No acute distress is noted throughout evaluation. Alberto Berumen denies suicidal/homicidal ideation in addition to thoughts of self-injury, receptive to crisis planning provided by this writer, contacting for safety in the inpatient setting, admitting to an ability to appropriately confide in staff including this writer. Patient admits to depressed mood and negative auditory hallucinations, scant, sparse, somewhat suspicious on approach, pending residential placement    PSYCHIATRIC REVIEW OF SYSTEMS     Behavior over the last 24 hours:  unchanged  Sleep: adequate  Appetite: adequate  Medication side effects: No    REVIEW OF SYSTEMS   Review of  systems: no complaints    OBJECTIVE     Vital Signs in Past 24 Hours:  Temp:  [96.7 °F (35.9 °C)-97.5 °F (36.4 °C)] 97.2 °F (36.2 °C)  HR:  [] 96  Resp:  [11-23] 18  BP: (106-187)/(61-99) 112/61    Intake/Output in Past 24 hours:  No intake/output data recorded.  I/O this shift:  In: 500 [I.V.:500]  Out: -         Laboratory Results:  I have personally reviewed all pertinent laboratory/tests results.  Most Recent Labs:   Lab Results   Component Value Date    WBC 11.55 (H) 08/29/2024    RBC 5.32 08/29/2024    HGB 12.9 08/29/2024    HCT 42.0 08/29/2024     08/29/2024    RDW 14.3 08/29/2024    NEUTROABS 6.36 08/29/2024    SODIUM 136 06/03/2024    K 3.8 06/03/2024    CL 96 06/03/2024    CO2 29 06/03/2024    BUN 10 06/03/2024    CREATININE 1.00 06/03/2024    GLUC 97 06/03/2024    GLUF 103 (H) 05/29/2024    CALCIUM 10.1 06/03/2024    AST 33 06/03/2024    ALT 72 (H) 06/03/2024    ALKPHOS 80 06/03/2024    TP 8.5 (H) 06/03/2024    ALB 4.7 06/03/2024    TBILI 0.30 06/03/2024    CHOLESTEROL 156 03/14/2024    HDL 44 03/14/2024    TRIG 133 03/14/2024    LDLCALC 85 03/14/2024    NONHDLC 112 03/14/2024    LITHIUM 0.61 01/09/2024    [id number] 1.062 11/15/2023    HGBA1C 5.0 04/01/2024    EAG 97 04/01/2024       Behavioral Health Medications: all current active meds have been reviewed and current meds:   Current Facility-Administered Medications   Medication Dose Route Frequency    acetaminophen (TYLENOL) tablet 650 mg  650 mg Oral Q4H PRN    acetaminophen (TYLENOL) tablet 650 mg  650 mg Oral Q6H PRN    aluminum-magnesium hydroxide-simethicone (MAALOX) oral suspension 30 mL  30 mL Oral Q4H PRN    amLODIPine (NORVASC) tablet 5 mg  5 mg Oral Daily    ARIPiprazole (ABILIFY) tablet 15 mg  15 mg Oral Daily    Artificial Tears ophthalmic solution 1 drop  1 drop Both Eyes Q3H PRN    atropine (ISOPTO ATROPINE) 1 % ophthalmic solution 1 drop  1 drop Sublingual Daily PRN    haloperidol lactate (HALDOL) injection 2.5 mg  2.5 mg  Intramuscular Q4H PRN Max 4/day    And    LORazepam (ATIVAN) injection 1 mg  1 mg Intramuscular Q4H PRN Max 4/day    And    benztropine (COGENTIN) injection 0.5 mg  0.5 mg Intramuscular Q4H PRN Max 4/day    benztropine (COGENTIN) injection 1 mg  1 mg Intramuscular Q4H PRN Max 6/day    haloperidol lactate (HALDOL) injection 5 mg  5 mg Intramuscular Q4H PRN Max 4/day    And    LORazepam (ATIVAN) injection 2 mg  2 mg Intramuscular Q4H PRN Max 4/day    And    benztropine (COGENTIN) injection 1 mg  1 mg Intramuscular Q4H PRN Max 4/day    benztropine (COGENTIN) tablet 1 mg  1 mg Oral Q4H PRN Max 6/day    benztropine (COGENTIN) tablet 1 mg  1 mg Oral Q4H PRN Max 6/day    bisacodyl (DULCOLAX) rectal suppository 10 mg  10 mg Rectal Daily PRN    calcium carbonate (TUMS) chewable tablet 500 mg  500 mg Oral BID PRN    clozapine (CLOZARIL) tablet 200 mg  200 mg Oral Before Dinner    cloZAPine (CLOZARIL) tablet 300 mg  300 mg Oral HS    cyanocobalamin (VITAMIN B-12) tablet 1,000 mcg  1,000 mcg Oral Daily    hydrOXYzine HCL (ATARAX) tablet 50 mg  50 mg Oral Q6H PRN Max 4/day    Or    diphenhydrAMINE (BENADRYL) injection 50 mg  50 mg Intramuscular Q6H PRN    hydrOXYzine HCL (ATARAX) tablet 50 mg  50 mg Oral Q6H PRN Max 4/day    Or    diphenhydrAMINE (BENADRYL) injection 50 mg  50 mg Intramuscular Q6H PRN    diphenhydrAMINE-zinc acetate (BENADRYL) 2-0.1 % cream   Topical BID PRN    escitalopram (LEXAPRO) tablet 20 mg  20 mg Oral Daily    famotidine (PEPCID) tablet 20 mg  20 mg Oral BID    fluticasone (FLONASE) 50 mcg/act nasal spray 1 spray  1 spray Each Nare Daily    glycopyrrolate (ROBINUL) tablet 1 mg  1 mg Oral BID (AM & Afternoon)    glycopyrrolate (ROBINUL) tablet 2 mg  2 mg Oral HS    haloperidol (HALDOL) tablet 1 mg  1 mg Oral Q6H PRN    haloperidol (HALDOL) tablet 2.5 mg  2.5 mg Oral Q4H PRN Max 4/day    haloperidol (HALDOL) tablet 5 mg  5 mg Oral Q4H PRN Max 4/day    hydroCHLOROthiazide tablet 12.5 mg  12.5 mg Oral Daily     hydrocortisone 1 % ointment   Topical 4x Daily PRN    hydrOXYzine HCL (ATARAX) tablet 100 mg  100 mg Oral Q6H PRN Max 4/day    Or    LORazepam (ATIVAN) injection 2 mg  2 mg Intramuscular Q6H PRN    hydrOXYzine HCL (ATARAX) tablet 100 mg  100 mg Oral Q6H PRN Max 4/day    Or    LORazepam (ATIVAN) injection 2 mg  2 mg Intramuscular Q6H PRN    hydrOXYzine HCL (ATARAX) tablet 25 mg  25 mg Oral Q6H PRN Max 4/day    ibuprofen (MOTRIN) tablet 600 mg  600 mg Oral Q8H PRN    lactated ringers infusion  50 mL/hr Intravenous Continuous    loratadine (CLARITIN) tablet 10 mg  10 mg Oral Daily    melatonin tablet 3 mg  3 mg Oral HS PRN    metFORMIN (GLUCOPHAGE) tablet 500 mg  500 mg Oral BID With Meals    methocarbamol (ROBAXIN) tablet 500 mg  500 mg Oral Q6H PRN    nicotine polacrilex (NICORETTE) gum 2 mg  2 mg Oral Q4H PRN    OLANZapine (ZyPREXA) tablet 5 mg  5 mg Oral Q4H PRN Max 3/day    Or    OLANZapine (ZyPREXA) IM injection 2.5 mg  2.5 mg Intramuscular Q4H PRN Max 3/day    OLANZapine (ZyPREXA) tablet 5 mg  5 mg Oral Q3H PRN Max 3/day    Or    OLANZapine (ZyPREXA) IM injection 5 mg  5 mg Intramuscular Q3H PRN Max 3/day    OLANZapine (ZyPREXA) tablet 2.5 mg  2.5 mg Oral Q4H PRN Max 6/day    ondansetron (ZOFRAN-ODT) dispersible tablet 4 mg  4 mg Oral Q6H PRN    pantoprazole (PROTONIX) EC tablet 20 mg  20 mg Oral QAM    polyethylene glycol (MIRALAX) packet 17 g  17 g Oral Daily PRN    propranolol (INDERAL) tablet 10 mg  10 mg Oral Q12H KAYLIE    senna-docusate sodium (SENOKOT S) 8.6-50 mg per tablet 1 tablet  1 tablet Oral Daily PRN    senna-docusate sodium (SENOKOT S) 8.6-50 mg per tablet 2 tablet  2 tablet Oral HS    traZODone (DESYREL) tablet 50 mg  50 mg Oral HS PRN    white petrolatum-mineral oil (EUCERIN,HYDROCERIN) cream   Topical TID PRN   .    Risks, benefits and possible side effects of Medications:   Risks, benefits, and possible side effects of medications explained to patient and patient verbalizes understanding.       Mental Status Evaluation:  Appearance:  age appropriate, casually dressed, and disheveled   Behavior:  psychomotor retardation, suspicious   Speech:  soft and scant   Mood:  Depressed, dysphoric   Affect:  flat and mood-congruent   Language sparse   Thought Process:  concrete   Thought Content:  Negative thinking, appearing paranoid   Perceptual Disturbances: Auditory hallucinations without commands   Risk Potential: Suicidal Ideations none, Homicidal Ideations none, and Potential for Aggression No   Sensorium:  person, place, and time/date   Cognition:  recent and remote memory grossly intact   Consciousness:  alert and awake    Attention: attention span appeared shorter than expected for age   Insight:  limited   Judgment: limited   Intellect Not assessed   Gait/Station: normal gait/station and normal balance   Motor Activity: no abnormal movements     Memory: Short and long term memory  fair     Progress Toward Goals: unchanged, as evidenced by their participation (or lack thereof) in individual, social and therapeutic milieu in addition to adherence to their medication regimen.    Recommended Treatment:   See above for assessment and plan.    Inpatient Psychiatric Certification: Based upon physical, mental and social evaluations, I certify that inpatient psychiatric services are medically necessary for this patient for a duration of greater than 2 midnights for the treatment of Schizoaffective disorder, bipolar type (HCC) including psychotropic medication management, participation in the therapeutic milieu and referrals as indicated. Available alternative community resources do not meet the patient's mental health care needs. I further attest that an established written individualized plan of care has been implemented and is outlined in the patient's medical records.    Counseling/Coordination of Care    Total unit time spent today was greater than 15 minutes. Greater than 50% of total time was spent with  the patient and/or patient's relatives and/or coordination of patient's care.     Patient's rights, confidentiality, exceptions to confidentiality, use of electronic medical record including appropriate staff access to medical record regarding behavioral health services and consent to treatment were reviewed.    Note Share:     This note was not shared with the patient due to reasonable likelihood of causing patient harm     This note has been constructed using a voice recognition system.    There may be translation, syntax, or grammatical errors. If you have any questions, please contact the dictating provider.    Jordan Christopher Holter, DO 08/30/24

## 2024-08-30 NOTE — CMS CERTIFICATION NOTE
Recertification: Based upon physical, mental and social evaluations, I certify that inpatient psychiatric services continue to be medically necessary for this patient for a duration of 30 midnights for the treatment of  Schizoaffective disorder, bipolar type (HCC) Available alternative community resources still do not meet the patient's mental health care needs. I further attest that an established written individualized plan of care has been updated and is outlined in the patient's medical records.

## 2024-08-30 NOTE — PLAN OF CARE
Problem: Alteration in Thoughts and Perception  Goal: Refrain from acting on delusional thinking/internal stimuli  Description: Interventions:  - Monitor patient closely, per order   - Utilize least restrictive measures   - Set reasonable limits, give positive feedback for acceptable   - Administer medications as ordered and monitor of potential side effects  Outcome: Progressing  Goal: Agree to be compliant with medication regime, as prescribed and report medication side effects  Description: Interventions:  - Offer appropriate PRN medication and supervise ingestion; conduct AIMS, as needed   Outcome: Progressing     Problem: Ineffective Coping  Goal: Identifies ineffective coping skills  Outcome: Progressing  Goal: Identifies healthy coping skills  Outcome: Progressing  Goal: Demonstrates healthy coping skills  Outcome: Progressing     Problem: Depression  Goal: Refrain from isolation  Description: Interventions:  - Develop a trusting relationship   - Encourage socialization   Outcome: Progressing     Problem: Anxiety  Goal: Anxiety is at manageable level  Description: Interventions:  - Assess and monitor patient's anxiety level.   - Monitor for signs and symptoms (heart palpitations, chest pain, shortness of breath, headaches, nausea, feeling jumpy, restlessness, irritable, apprehensive).   - Collaborate with interdisciplinary team and initiate plan and interventions as ordered.  - [place] patient to unit/surroundings  - Explain treatment plan  - Encourage participation in care  - Encourage verbalization of concerns/fears  - Identify coping mechanisms  - Assist in developing anxiety-reducing skills  - Administer/offer alternative therapies  - Limit or eliminate stimulants  Outcome: Progressing     Problem: Alteration in Thoughts and Perception  Goal: Treatment Goal: Gain control of psychotic behaviors/thinking, reduce/eliminate presenting symptoms and demonstrate improved reality functioning upon  discharge  Outcome: Not Progressing  Goal: Attend and participate in unit activities, including therapeutic, recreational, and educational groups  Description: Interventions:  -Encourage Visitation and family involvement in care  Outcome: Not Progressing  Goal: Recognize dysfunctional thoughts, communicate reality-based thoughts at the time of discharge  Description: Interventions:  - Provide medication and psycho-education to assist patient in compliance and developing insight into his/her illness   Outcome: Not Progressing     Problem: Depression  Goal: Treatment Goal: Demonstrate behavioral control of depressive symptoms, verbalize feelings of improved mood/affect, and adopt new coping skills prior to discharge  Outcome: Not Progressing

## 2024-08-30 NOTE — ANESTHESIA POSTPROCEDURE EVALUATION
Post-Op Assessment Note    CV Status:  Stable  Pain Score: 0    Pain management: adequate       Mental Status:  Awake and arousable   Hydration Status:  Stable   PONV Controlled:  None   Airway Patency:  Patent     Post Op Vitals Reviewed: Yes    No anethesia notable event occurred.    Staff: CRNA           BP      Temp      Pulse     Resp      SpO2

## 2024-08-30 NOTE — PROGRESS NOTES
08/30/24 0748   Team Meeting   Meeting Type Daily Rounds   Team Members Present   Team Members Present Physician;Nurse;;Other (Discipline and Name)   Patient/Family Present   Patient Present No   Patient's Family Present No     In attendance:  Dr. Jordan Holter, DO Daniel Teles, RN  Luisana Alvarado, \[facility] [place] [facility]\""[facility]    Groups: 0/8    Pt reports ongoing AH at the same level; reports using coping skills. Pt had labs completed. ECT this morning. On CRR waitlist. No bx issues noted.

## 2024-08-30 NOTE — NURSING NOTE
"Pt visible in milieu. Pleasant and cooperative. Denies psychiatric  symptoms. Compliant with meds and meals. Alberto had a virtual visit with \"brother-in-law\" this PM, he stated this went well. Given Nicorette gum at 1736 & 2130 . Labs drawn tonight - tolerated well.  "

## 2024-08-30 NOTE — PROCEDURES
Procedure Note - ECT  Alberto Berumen 28 y.o. male MRN: [id number]    Time out was taken with staff to confirm correct patient and correct procedure to be performed. Patient acknowledges slight improvement in his clinical signs/symptoms, denying any adverse effects    Session Number: maintenance    Diagnosis: Principal Problem:    Schizoaffective disorder, bipolar type ([facility])  Active Problems:    GERD (gastroesophageal reflux disease)    Medical clearance for psychiatric admission    Tobacco abuse    T wave inversion in EKG    Chronic idiopathic constipation    Confluent and reticulate papillomatosis    Primary hypertension    Elevated hemoglobin A1c    Bilateral lower extremity edema      ECT Type: Inpatient    Anesthesia: Etomidate    Electrode Placement: bilateral    Energy level: 100%    Seizure Duration     EE sec  EMG: 15 sec    PSI not available     Results:Clinical seizure was satisfactory, Patient tolerated ECT well    Vitals:    24 0620   BP: 137/99   Pulse: 86   Resp: 20   Temp:    SpO2: 97%        Medication Administration - last 24 hours from 2024 0724 to 2024 0724         Date/Time Order Dose Route Action Action by     2024 0830 EDT amLODIPine (NORVASC) tablet 5 mg 5 mg Oral Given Eveline Connolly RN     2024 0829 EDT escitalopram (LEXAPRO) tablet 20 mg 20 mg Oral Given Eveline Connolly RN     2024 2117 EDT propranolol (INDERAL) tablet 10 mg 10 mg Oral Given Aviva Siddiqui RN     2024 0829 EDT propranolol (INDERAL) tablet 10 mg 10 mg Oral Given Eveline Connolly RN     2024 0830 EDT cyanocobalamin (VITAMIN B-12) tablet 1,000 mcg 1,000 mcg Oral Given Eveline Connolly RN     2024 2130 EDT nicotine polacrilex (NICORETTE) gum 2 mg 2 mg Oral Given Aviva Siddiqui RN     2024 1736 EDT nicotine polacrilex (NICORETTE) gum 2 mg 2 mg Oral Given Aviva Siddiqui RN     2024 1358 EDT nicotine polacrilex (NICORETTE) gum 2 mg 2 mg Oral Given Eveline Connolly RN     2024 0830  EDT nicotine polacrilex (NICORETTE) gum 2 mg 2 mg Oral Given Eveline Connolly RN     08/29/2024 0830 EDT fluticasone (FLONASE) 50 mcg/act nasal spray 1 spray 1 spray Each Nare Given Eveline Connolly RN     08/29/2024 0829 EDT loratadine (CLARITIN) tablet 10 mg 10 mg Oral Given Eveline Connolly RN     08/29/2024 0829 EDT hydroCHLOROthiazide tablet 12.5 mg 12.5 mg Oral Given Eveline Connolly RN     08/29/2024 1625 EDT metFORMIN (GLUCOPHAGE) tablet 500 mg 500 mg Oral Given Aviva Siddiqui RN     08/29/2024 0829 EDT metFORMIN (GLUCOPHAGE) tablet 500 mg 500 mg Oral Given Eveline Connolly RN     08/29/2024 0829 EDT ARIPiprazole (ABILIFY) tablet 15 mg 15 mg Oral Given Eveline Connolly RN     08/29/2024 1736 EDT famotidine (PEPCID) tablet 20 mg 20 mg Oral Given Aviva Siddiqui RN     08/29/2024 0830 EDT famotidine (PEPCID) tablet 20 mg 20 mg Oral Given Eveline Connolly RN     08/29/2024 1625 EDT clozapine (CLOZARIL) tablet 200 mg 200 mg Oral Given Aviva Siddiqui RN     08/29/2024 2116 EDT cloZAPine (CLOZARIL) tablet 300 mg 300 mg Oral Given Aviva Siddiqui RN     08/29/2024 2116 EDT senna-docusate sodium (SENOKOT S) 8.6-50 mg per tablet 2 tablet 2 tablet Oral Given Aviva Siddiqui RN     08/29/2024 2117 EDT glycopyrrolate (ROBINUL) tablet 2 mg 2 mg Oral Given Aviva Siddiqui RN     08/29/2024 1358 EDT glycopyrrolate (ROBINUL) tablet 1 mg 1 mg Oral Given Eveline Connolly RN     08/29/2024 0830 EDT glycopyrrolate (ROBINUL) tablet 1 mg 1 mg Oral Given Eveline Connolly RN     08/29/2024 0830 EDT pantoprazole (PROTONIX) EC tablet 20 mg 20 mg Oral Given Emily Bryk, RN Jordan Christopher Holter, DO

## 2024-08-30 NOTE — PLAN OF CARE
Problem: Ineffective Coping  Goal: Identifies ineffective coping skills  Outcome: Not Progressing  Goal: Identifies healthy coping skills  Outcome: Progressing  Goal: Demonstrates healthy coping skills  Outcome: Not Progressing  Goal: Participates in unit activities  Description: Interventions:  - Provide therapeutic environment   - Provide required programming   - Redirect inappropriate behaviors   Outcome: Not Progressing

## 2024-08-30 NOTE — NURSING NOTE
Pt is isolative to self and room today after having ECT this morning. Refused breakfast, ate 50% lunch. Took medications without incidence, held Norvasc for BP parameters. Pt is pleasant and cooperative. Reports AH that threaten him and his family and denies other psych symptoms. No behavioral issues. Pt offers no complaints. Continuous safety checks maintained.

## 2024-08-31 PROCEDURE — 99232 SBSQ HOSP IP/OBS MODERATE 35: CPT | Performed by: NURSE PRACTITIONER

## 2024-08-31 RX ADMIN — NICOTINE POLACRILEX 2 MG: 2 GUM, CHEWING ORAL at 08:56

## 2024-08-31 RX ADMIN — NICOTINE POLACRILEX 2 MG: 2 GUM, CHEWING ORAL at 13:06

## 2024-08-31 RX ADMIN — ESCITALOPRAM OXALATE 20 MG: 10 TABLET, FILM COATED ORAL at 08:56

## 2024-08-31 RX ADMIN — PANTOPRAZOLE SODIUM 20 MG: 20 TABLET, DELAYED RELEASE ORAL at 08:56

## 2024-08-31 RX ADMIN — NICOTINE POLACRILEX 2 MG: 2 GUM, CHEWING ORAL at 21:39

## 2024-08-31 RX ADMIN — SENNOSIDES AND DOCUSATE SODIUM 2 TABLET: 8.6; 5 TABLET ORAL at 21:37

## 2024-08-31 RX ADMIN — PROPRANOLOL HYDROCHLORIDE 10 MG: 10 TABLET ORAL at 21:41

## 2024-08-31 RX ADMIN — CLOZAPINE 300 MG: 200 TABLET ORAL at 21:39

## 2024-08-31 RX ADMIN — METFORMIN HYDROCHLORIDE 500 MG: 500 TABLET, FILM COATED ORAL at 17:07

## 2024-08-31 RX ADMIN — ARIPIPRAZOLE 15 MG: 15 TABLET ORAL at 08:56

## 2024-08-31 RX ADMIN — METFORMIN HYDROCHLORIDE 500 MG: 500 TABLET, FILM COATED ORAL at 08:56

## 2024-08-31 RX ADMIN — GLYCOPYRROLATE 2 MG: 1 TABLET ORAL at 21:39

## 2024-08-31 RX ADMIN — FAMOTIDINE 20 MG: 20 TABLET ORAL at 17:07

## 2024-08-31 RX ADMIN — GLYCOPYRROLATE 1 MG: 1 TABLET ORAL at 13:08

## 2024-08-31 RX ADMIN — NICOTINE POLACRILEX 2 MG: 2 GUM, CHEWING ORAL at 17:07

## 2024-08-31 RX ADMIN — CYANOCOBALAMIN TAB 1000 MCG 1000 MCG: 1000 TAB at 08:57

## 2024-08-31 RX ADMIN — CLOZAPINE 200 MG: 200 TABLET ORAL at 17:07

## 2024-08-31 RX ADMIN — GLYCOPYRROLATE 1 MG: 1 TABLET ORAL at 08:56

## 2024-08-31 RX ADMIN — LORATADINE 10 MG: 10 TABLET ORAL at 08:57

## 2024-08-31 RX ADMIN — FAMOTIDINE 20 MG: 20 TABLET ORAL at 08:56

## 2024-08-31 NOTE — NURSING NOTE
Alberto reports feeling a little depressed.  Denied anxiety and pain.  Positive auditory hallucination, voices are still telling him that they will kill him and his family.  Med and meal compliant, consumed, 100% of dinner.  No behavioral issues.

## 2024-08-31 NOTE — PROGRESS NOTES
"Progress Note - Behavioral Health   Alberto Berumen 28 y.o. male MRN: [id number]  [facility] Encounter: [id number]      Subjective:     Documentation, nursing notes, medication reconciliation, labs, and vitals reviewed. Patient was seen for continuing care and reviewed with treatment team.  No acute events over the past 24 hours. Per nursing report, patient tolerated ECT treatment well yesterday with more energy following treatment; attended 6/10 groups yesterday. No medication changes over the past 24 hours.     Clozaril monitorin24  CBC/diff: WBC 11.5 (H) increase from 10.25 on 24 (H)- VSS, no s/s of infection  ANC WNL  CRP 8.8 (H) decreased from 10.3 (H) on 24  CK and BNP on 24: WNL  Clozapine level 663 ng/Ml on 24  On Colace to prevent clozaril induced constipation.  Last BM 24    On evaluation today, Alberto is resting in bed.  Minimally participates in interview, often snoring in between questions, though he requests to continue engagement with this provider.  Reports that he feels \"tired\" with depression symptoms unchanged.  Currently rates symptoms 8/10 (10 being worst) and adamantly denies any thoughts of self-harm or suicide.  Reports that auditory hallucinations have improved somewhat as they are \"not as loud\".  He is looking forward to speaking with his mother, though notes that his mother is handicapped and is not in possession of a telephone.  Therefore, communication occurs only when his sister is present to assist with the call. Continues to tolerate current medications with no adverse effects. No thoughts to harm others.  No agitation or aggression noted. Does not appear overtly manic. Offers no further complaints.       Psychiatric ROS:  Behavior over the last 24 hours: unchanged  Sleep: normal  Appetite: normal  Medication side effects: No   ROS: all other systems are negative      Mental Status Evaluation:    Appearance:  disheveled   Behavior:  " psychomotor retardation   Speech:  scant, soft, decreased volume   Mood:  depressed, dysphoric   Affect:  flat, mood-congruent   Thought Process:  concrete   Associations: concrete associations   Thought Content:  paranoid ideation   Perceptual Disturbances: auditory hallucinations, not observed   Risk Potential: Suicidal ideation - None  Homicidal ideation - None  Potential for aggression - No   Sensorium:  oriented to person, place, and time/date   Memory:  recent and remote memory grossly intact   Consciousness:  alert and awake   Attention/Concentration: attention span and concentration appear shorter than expected for age   Insight:  limited   Judgment: limited   Gait/Station: in bed   Motor Activity: no abnormal movements       Vital signs in last 24 hours:    Temp:  [98 °F (36.7 °C)-98.2 °F (36.8 °C)] 98.2 °F (36.8 °C)  HR:  [] 90  Resp:  [18] 18  BP: (109-131)/(55-86) 131/85    Laboratory results: I have personally reviewed all pertinent laboratory/tests results  Labs in last 72 hours:   Recent Labs     08/29/24 2022   WBC 11.55*   RBC 5.32   HGB 12.9   HCT 42.0      RDW 14.3   NEUTROABS 6.36       Progress Toward Goals: no progress today    Suicide/Homicide Risk Assessment:    Risk of Harm to Self:   Nursing Suicide Risk Assessment Last 24 hours: C-SSRS Risk (Since Last Contact)  Calculated C-SSRS Risk Score (Since Last Contact): No Risk Indicated    Risk of Harm to Others:  Nursing Homicide Risk Assessment: Violence Risk to Others: Denies within past 6 months    Assessment & Plan   Principal Problem:    Schizoaffective disorder, bipolar type (HCC)  Active Problems:    GERD (gastroesophageal reflux disease)    Medical clearance for psychiatric admission    Tobacco abuse    T wave inversion in EKG    Chronic idiopathic constipation    Confluent and reticulate papillomatosis    Primary hypertension    Elevated hemoglobin A1c    Bilateral lower extremity edema      Recommended Treatment:      Planned medication and treatment changes:    All current active medications have been reviewed  Encourage group therapy, milieu therapy and occupational therapy  Behavioral Health checks every 7 minutes  Continue with SLIM medical management as indicated  Disposition planning ongoing per primary team; 201 commitment status    Continue current medications:    Current Facility-Administered Medications   Medication Dose Route Frequency Provider Last Rate    acetaminophen  650 mg Oral Q4H PRN Jordan C Holter, DO      acetaminophen  650 mg Oral Q6H PRN HOLLI Lion      aluminum-magnesium hydroxide-simethicone  30 mL Oral Q4H PRN Jordan C Holter, DO      amLODIPine  5 mg Oral Daily STEVE LionNP      ARIPiprazole  15 mg Oral Daily Bora Rosario MD      Artificial Tears  1 drop Both Eyes Q3H PRN Jordan C Holter, DO      atropine  1 drop Sublingual Daily PRN HOLLI Lion      haloperidol lactate  2.5 mg Intramuscular Q4H PRN Max 4/day STEVE LionNP      And    LORazepam  1 mg Intramuscular Q4H PRN Max 4/day HOLLI Lion      And    benztropine  0.5 mg Intramuscular Q4H PRN Max 4/day HOLLI Lion      benztropine  1 mg Intramuscular Q4H PRN Max 6/day Jordan C Holter, DO      haloperidol lactate  5 mg Intramuscular Q4H PRN Max 4/day HOLLI Lion      And    LORazepam  2 mg Intramuscular Q4H PRN Max 4/day HOLLI Lion      And    benztropine  1 mg Intramuscular Q4H PRN Max 4/day HOLLI Lion      benztropine  1 mg Oral Q4H PRN Max 6/day HOLLI Lion      benztropine  1 mg Oral Q4H PRN Max 6/day Jordan C Holter, DO      bisacodyl  10 mg Rectal Daily PRN HOLLI Lion      calcium carbonate  500 mg Oral BID PRN HOLLI Monge      cloZAPine  200 mg Oral Before Dinner Bora Rosario MD      cloZAPine  300 mg Oral HS Bora Rosario MD      cyanocobalamin  1,000 mcg Oral Daily Pia Mantilla, HOLLI      hydrOXYzine HCL  50 mg Oral Q6H PRN Max  4/day Jordan C Holter, DO      Or    diphenhydrAMINE  50 mg Intramuscular Q6H PRN Jordan C Holter, DO      hydrOXYzine HCL  50 mg Oral Q6H PRN Max 4/day HOLLI Lion      Or    diphenhydrAMINE  50 mg Intramuscular Q6H PRN HOLLI Lion      diphenhydrAMINE-zinc acetate   Topical BID PRN HOLLI Lion      escitalopram  20 mg Oral Daily HOLLI Lion      famotidine  20 mg Oral BID Eileen CherryHOLLI Jimenez      fluticasone  1 spray Each Nare Daily Brina Guillen MD      glycopyrrolate  1 mg Oral BID (AM & Afternoon) Bora Rosario MD      glycopyrrolate  2 mg Oral HS Bora Rosario MD      haloperidol  1 mg Oral Q6H PRN HOLLI Lion      haloperidol  2.5 mg Oral Q4H PRN Max 4/day HOLLI Lion      haloperidol  5 mg Oral Q4H PRN Max 4/day HOLLI Lion      hydroCHLOROthiazide  12.5 mg Oral Daily HOLLI Galvan      hydrocortisone   Topical 4x Daily PRN HOLLI Lion      hydrOXYzine HCL  100 mg Oral Q6H PRN Max 4/day Jordan C Holter, DO      Or    LORazepam  2 mg Intramuscular Q6H PRN Jordan C Holter, DO      hydrOXYzine HCL  100 mg Oral Q6H PRN Max 4/day HOLLI Lion      Or    LORazepam  2 mg Intramuscular Q6H PRN HOLLI Lion      hydrOXYzine HCL  25 mg Oral Q6H PRN Max 4/day Jordan C Holter, DO      ibuprofen  600 mg Oral Q8H PRN HOLLI Lion      lactated ringers  50 mL/hr Intravenous Continuous Antwan Dong MD      loratadine  10 mg Oral Daily Brina Guillen MD      melatonin  3 mg Oral HS PRN Bora Rosario MD      metFORMIN  500 mg Oral BID With Meals HOLLI Galvan      methocarbamol  500 mg Oral Q6H PRN HOLLI Lion      nicotine polacrilex  2 mg Oral Q4H PRN Bora Rosario MD      OLANZapine  5 mg Oral Q4H PRN Max 3/day Jordan C Holter, DO      Or    OLANZapine  2.5 mg Intramuscular Q4H PRN Max 3/day Ion FISCHER Holter, DO      OLANZapine  5 mg Oral Q3H PRN Max 3/day Ion FISCHER Holter, DO      Or    OLANZapine  5 mg  Intramuscular Q3H PRN Max 3/day Jordan C Holter, DO      OLANZapine  2.5 mg Oral Q4H PRN Max 6/day Jordan C Holter, DO      ondansetron  4 mg Oral Q6H PRN HOLLI Lion      pantoprazole  20 mg Oral QAM Ion FISCHER Holter, DO      polyethylene glycol  17 g Oral Daily PRN Jordan C Holter, DO      propranolol  10 mg Oral Q12H KAYLIE HOLLI Lion      senna-docusate sodium  1 tablet Oral Daily PRN Jordan C Holter, DO      senna-docusate sodium  2 tablet Oral HS Melvina Ambron, DO      traZODone  50 mg Oral HS PRN HOLLI Lion      white petrolatum-mineral oil   Topical TID PRN HOLLI Lion           Risks / Benefits of Treatment:    Risks, benefits, and possible side effects of medications explained to patient. Patient does not verbalize understanding of risks and benefits of treatment at this time and will require further explanation.      Counseling / Coordination of Care:    Patient's progress discussed with staff in treatment team meeting.  Medications, treatment progress and treatment plan reviewed with patient.    Note Share    This note was not shared with the patient due to reasonable likelihood of causing patient harm    HOLLI Ghotra 08/31/24

## 2024-08-31 NOTE — NURSING NOTE
"Pt is visible on the milieu and social with select peers. Pt is polite, pleasant, and cooperative. Took HS medications without incidence. Pt offers no complaints. Pt reports \"the same\" AH. Denies all psych symptoms. No behavioral issues.   "

## 2024-08-31 NOTE — PLAN OF CARE
Problem: Alteration in Thoughts and Perception  Goal: Verbalize thoughts and feelings  Description: Interventions:  - Promote a nonjudgmental and trusting relationship with the patient through active listening and therapeutic communication  - Assess patient's level of functioning, behavior and potential for risk  - Engage patient in 1 on 1 interactions  - Encourage patient to express fears, feelings, frustrations, and discuss symptoms    - [place] patient to reality, help patient recognize reality-based thinking   - Administer medications as ordered and assess for potential side effects  - Provide the patient education related to the signs and symptoms of the illness and desired effects of prescribed medications  Outcome: Progressing  Goal: Refrain from acting on delusional thinking/internal stimuli  Description: Interventions:  - Monitor patient closely, per order   - Utilize least restrictive measures   - Set reasonable limits, give positive feedback for acceptable   - Administer medications as ordered and monitor of potential side effects  Outcome: Progressing  Goal: Agree to be compliant with medication regime, as prescribed and report medication side effects  Description: Interventions:  - Offer appropriate PRN medication and supervise ingestion; conduct AIMS, as needed   Outcome: Progressing  Goal: Attend and participate in unit activities, including therapeutic, recreational, and educational groups  Description: Interventions:  -Encourage Visitation and family involvement in care  Outcome: Progressing  Goal: Recognize dysfunctional thoughts, communicate reality-based thoughts at the time of discharge  Description: Interventions:  - Provide medication and psycho-education to assist patient in compliance and developing insight into his/her illness   Outcome: Progressing  Goal: Complete daily ADLs, including personal hygiene independently, as able  Description: Interventions:  - Observe, teach, and assist  patient with ADLS  - Monitor and promote a balance of rest/activity, with adequate nutrition and elimination   Outcome: Progressing     Problem: Depression  Goal: Verbalize thoughts and feelings  Description: Interventions:  - Assess and re-assess patient's level of risk   - Engage patient in 1:1 interactions, daily, for a minimum of 15 minutes   - Encourage patient to express feelings, fears, frustrations, hopes   Outcome: Progressing  Goal: Refrain from harming self  Description: Interventions:  - Monitor patient closely, per order   - Supervise medication ingestion, monitor effects and side effects   Outcome: Progressing  Goal: Refrain from isolation  Description: Interventions:  - Develop a trusting relationship   - Encourage socialization   Outcome: Progressing  Goal: Refrain from self-neglect  Outcome: Progressing

## 2024-08-31 NOTE — NURSING NOTE
Alert, cooperative and visible intermittently. No SI or HI noted. Pt noted with depression. Pt states he continues to have auditory halluciantions. Denies anxiety and pain. Attended coping skills, and nursing skills. Refused breakfast and consumed 100% of lunch. Took all medication without prompting. Except norvasc, and propanolol held secondary to parameters. Maintained on safe precautions without incident. Will continue to monitor progress and recovery program.

## 2024-09-01 PROCEDURE — 99232 SBSQ HOSP IP/OBS MODERATE 35: CPT | Performed by: NURSE PRACTITIONER

## 2024-09-01 RX ORDER — POLYETHYLENE GLYCOL 3350 17 G/17G
17 POWDER, FOR SOLUTION ORAL DAILY PRN
Status: DISCONTINUED | OUTPATIENT
Start: 2024-09-01 | End: 2025-03-14 | Stop reason: HOSPADM

## 2024-09-01 RX ADMIN — METFORMIN HYDROCHLORIDE 500 MG: 500 TABLET, FILM COATED ORAL at 16:30

## 2024-09-01 RX ADMIN — PANTOPRAZOLE SODIUM 20 MG: 20 TABLET, DELAYED RELEASE ORAL at 08:29

## 2024-09-01 RX ADMIN — ARIPIPRAZOLE 15 MG: 15 TABLET ORAL at 08:31

## 2024-09-01 RX ADMIN — FLUTICASONE PROPIONATE 1 SPRAY: 50 SPRAY, METERED NASAL at 08:34

## 2024-09-01 RX ADMIN — AMLODIPINE BESYLATE 5 MG: 5 TABLET ORAL at 08:31

## 2024-09-01 RX ADMIN — NICOTINE POLACRILEX 2 MG: 2 GUM, CHEWING ORAL at 17:42

## 2024-09-01 RX ADMIN — CYANOCOBALAMIN TAB 1000 MCG 1000 MCG: 1000 TAB at 08:31

## 2024-09-01 RX ADMIN — FAMOTIDINE 20 MG: 20 TABLET ORAL at 17:43

## 2024-09-01 RX ADMIN — NICOTINE POLACRILEX 2 MG: 2 GUM, CHEWING ORAL at 21:26

## 2024-09-01 RX ADMIN — CLOZAPINE 300 MG: 200 TABLET ORAL at 21:27

## 2024-09-01 RX ADMIN — POLYETHYLENE GLYCOL 3350 17 G: 17 POWDER, FOR SOLUTION ORAL at 11:29

## 2024-09-01 RX ADMIN — ESCITALOPRAM OXALATE 20 MG: 10 TABLET, FILM COATED ORAL at 08:32

## 2024-09-01 RX ADMIN — PROPRANOLOL HYDROCHLORIDE 10 MG: 10 TABLET ORAL at 21:27

## 2024-09-01 RX ADMIN — PROPRANOLOL HYDROCHLORIDE 10 MG: 10 TABLET ORAL at 08:30

## 2024-09-01 RX ADMIN — SENNOSIDES AND DOCUSATE SODIUM 2 TABLET: 8.6; 5 TABLET ORAL at 21:26

## 2024-09-01 RX ADMIN — CLOZAPINE 200 MG: 200 TABLET ORAL at 16:30

## 2024-09-01 RX ADMIN — GLYCOPYRROLATE 1 MG: 1 TABLET ORAL at 14:08

## 2024-09-01 RX ADMIN — GLYCOPYRROLATE 2 MG: 1 TABLET ORAL at 21:27

## 2024-09-01 RX ADMIN — HYDROCHLOROTHIAZIDE 12.5 MG: 12.5 TABLET ORAL at 08:32

## 2024-09-01 RX ADMIN — FAMOTIDINE 20 MG: 20 TABLET ORAL at 08:31

## 2024-09-01 RX ADMIN — LORATADINE 10 MG: 10 TABLET ORAL at 08:30

## 2024-09-01 RX ADMIN — METFORMIN HYDROCHLORIDE 500 MG: 500 TABLET, FILM COATED ORAL at 08:30

## 2024-09-01 RX ADMIN — NICOTINE POLACRILEX 2 MG: 2 GUM, CHEWING ORAL at 08:29

## 2024-09-01 RX ADMIN — GLYCOPYRROLATE 1 MG: 1 TABLET ORAL at 08:31

## 2024-09-01 NOTE — PROGRESS NOTES
"Progress Note - Behavioral Health   Alberto Berumen 28 y.o. male MRN: [id number]  Unit/Bed#: [facility] 108-02 Encounter: [id number]      Subjective:     Documentation, nursing notes, medication reconciliation, labs, and vitals reviewed. Patient was seen for continuing care and reviewed with treatment team.  No acute events over the past 24 hours. Per nursing report, patient reports ongoing depression with constipation, last BM 8/31/2024, requests MiraLAX as needed daily. No medication changes over the past 24 hours.     On evaluation today, Alberto reports mood as \"'nikolas down\" rating symptoms 5/10 (10 being worst).  Adamantly denies thoughts of self-harm or suicide and contracts for safety on the unit.  Auditory hallucinations are unchanged and ongoing.  Appears internally preoccupied.  Denies visual hallucinations.  No delusional  content elicited during time of encounter.  No agitation or aggression noted.  No manic symptoms.  No other questions or concerns.    Psychiatric ROS:  Behavior over the last 24 hours: unchanged  Sleep: normal  Appetite: normal  Medication side effects: Yes - constipation; as needed MiraLAX ordered  ROS: all other systems are negative      Mental Status Evaluation:    Appearance:  disheveled   Behavior:  cooperative, calm, psychomotor retardation, slow responses   Speech:  slow, scant, increased latency of response, soft, decreased volume   Mood:  depressed   Affect:  flat, mood-congruent   Thought Process:  concrete   Associations: concrete associations   Thought Content:  paranoid ideation   Perceptual Disturbances: auditory hallucinations, chronic, no visual hallucinations, appears preoccupied   Risk Potential: Suicidal ideation - None, contracts for safety on the unit  Homicidal ideation - None  Potential for aggression - No   Sensorium:  oriented to person, place, and time/date   Memory:  recent and remote memory grossly intact   Consciousness:  alert and awake "   Attention/Concentration: attention span and concentration appear shorter than expected for age   Insight:  limited   Judgment: limited   Gait/Station: normal gait/station, normal balance   Motor Activity: no abnormal movements       Vital signs in last 24 hours:    Temp:  [97.8 °F (36.6 °C)-98.2 °F (36.8 °C)] 97.8 °F (36.6 °C)  HR:  [] 88  Resp:  [18] 18  BP: (117-140)/(82-85) 140/85    Laboratory results: I have personally reviewed all pertinent laboratory/tests results    Results from the past 24 hours: No results found for this or any previous visit (from the past 24 hour(s)).      Progress Toward Goals: no significant progress today    Suicide/Homicide Risk Assessment:    Risk of Harm to Self:   Nursing Suicide Risk Assessment Last 24 hours: C-SSRS Risk (Since Last Contact)  Calculated C-SSRS Risk Score (Since Last Contact): No Risk Indicated    Risk of Harm to Others:  Nursing Homicide Risk Assessment: Violence Risk to Others: Denies within past 6 months    Assessment & Plan   Principal Problem:    Schizoaffective disorder, bipolar type (HCC)  Active Problems:    GERD (gastroesophageal reflux disease)    Medical clearance for psychiatric admission    Tobacco abuse    T wave inversion in EKG    Chronic idiopathic constipation    Confluent and reticulate papillomatosis    Primary hypertension    Elevated hemoglobin A1c    Bilateral lower extremity edema      Recommended Treatment:     Planned medication and treatment changes:    All current active medications have been reviewed  Encourage group therapy, milieu therapy and occupational therapy  Behavioral Health checks every 7 minutes  Continue with SLIM medical management as indicated  Disposition planning ongoing for primary team; 201 commitment status    Continue current medications:    Current Facility-Administered Medications   Medication Dose Route Frequency Provider Last Rate    acetaminophen  650 mg Oral Q4H PRN Jordan C Holter, DO       acetaminophen  650 mg Oral Q6H PRN HOLLI Lion      aluminum-magnesium hydroxide-simethicone  30 mL Oral Q4H PRN Jordan C Holter, DO      amLODIPine  5 mg Oral Daily HOLLI Lion      ARIPiprazole  15 mg Oral Daily Bora Rosario MD      Artificial Tears  1 drop Both Eyes Q3H PRN Jordan C Holter, DO      atropine  1 drop Sublingual Daily PRN STEVE LionNP      haloperidol lactate  2.5 mg Intramuscular Q4H PRN Max 4/day STEVE LionNP      And    LORazepam  1 mg Intramuscular Q4H PRN Max 4/day STEVE LionNP      And    benztropine  0.5 mg Intramuscular Q4H PRN Max 4/day HOLLI Lion      benztropine  1 mg Intramuscular Q4H PRN Max 6/day Jordan C Holter, DO      haloperidol lactate  5 mg Intramuscular Q4H PRN Max 4/day STEVE LionNP      And    LORazepam  2 mg Intramuscular Q4H PRN Max 4/day HOLLI Lion      And    benztropine  1 mg Intramuscular Q4H PRN Max 4/day HOLLI Lion      benztropine  1 mg Oral Q4H PRN Max 6/day HOLLI Lion      benztropine  1 mg Oral Q4H PRN Max 6/day Jordan C Holter, DO      bisacodyl  10 mg Rectal Daily PRN HOLLI Lion      calcium carbonate  500 mg Oral BID PRN HOLLI Monge      cloZAPine  200 mg Oral Before Dinner Bora Rosario MD      cloZAPine  300 mg Oral HS Bora Rosario MD      cyanocobalamin  1,000 mcg Oral Daily HOLLI Galvan      hydrOXYzine HCL  50 mg Oral Q6H PRN Max 4/day Jordan C Holter, DO      Or    diphenhydrAMINE  50 mg Intramuscular Q6H PRN Jordan C Holter, DO      hydrOXYzine HCL  50 mg Oral Q6H PRN Max 4/day HOLLI Lion      Or    diphenhydrAMINE  50 mg Intramuscular Q6H PRN HOLLI Lion      diphenhydrAMINE-zinc acetate   Topical BID PRN HOLLI Lion      escitalopram  20 mg Oral Daily HOLLI Lion      famotidine  20 mg Oral BID HOLLI Monge      fluticasone  1 spray Each Nare Daily Brina Guillen MD      glycopyrrolate  1 mg Oral BID  (AM & Afternoon) Bora Rosario MD      glycopyrrolate  2 mg Oral HS Bora Rosario MD      haloperidol  1 mg Oral Q6H PRN HOLLI Lion      haloperidol  2.5 mg Oral Q4H PRN Max 4/day HOLLI Lion      haloperidol  5 mg Oral Q4H PRN Max 4/day HOLLI Lion      hydroCHLOROthiazide  12.5 mg Oral Daily HOLLI Galvan      hydrocortisone   Topical 4x Daily PRN HOLLI Lion      hydrOXYzine HCL  100 mg Oral Q6H PRN Max 4/day [facility] Holter, DO      Or    LORazepam  2 mg Intramuscular Q6H PRN [facility] Holter, DO      hydrOXYzine HCL  100 mg Oral Q6H PRN Max 4/day HOLLI Lion      Or    LORazepam  2 mg Intramuscular Q6H PRN HOLLI Lion      hydrOXYzine HCL  25 mg Oral Q6H PRN Max 4/day [facility] Holter, DO      ibuprofen  600 mg Oral Q8H PRN HOLLI Lion      lactated ringers  50 mL/hr Intravenous Continuous Antwan Dong MD      loratadine  10 mg Oral Daily Brina Guillen MD      melatonin  3 mg Oral HS PRN Bora Rosario MD      metFORMIN  500 mg Oral BID With Meals HOLLI Galvan      methocarbamol  500 mg Oral Q6H PRN HOLLI Lion      nicotine polacrilex  2 mg Oral Q4H PRN Bora Rosario MD      OLANZapine  5 mg Oral Q4H PRN Max 3/day [facility] Holter, DO      Or    OLANZapine  2.5 mg Intramuscular Q4H PRN Max 3/day [facility] Holter, DO      OLANZapine  5 mg Oral Q3H PRN Max 3/day [facility] Holter, DO      Or    OLANZapine  5 mg Intramuscular Q3H PRN Max 3/day [facility] Holter, DO      OLANZapine  2.5 mg Oral Q4H PRN Max 6/day [facility] Holter, DO      ondansetron  4 mg Oral Q6H PRN HOLLI Lion      pantoprazole  20 mg Oral QAM [facility] Holter, DO      polyethylene glycol  17 g Oral Daily PRN [facility] Holter, DO      polyethylene glycol  17 g Oral Daily PRN HOLLI Ghotra      propranolol  10 mg Oral Q12H KAYLIE HOLLI Lion      senna-docusate sodium  1 tablet Oral Daily MISHELN Jordan C Holter, DO      senna-docusate sodium  2 tablet Oral  HS Melvina De Jesus DO      traZODone  50 mg Oral HS PRN HOLLI Lion      white petrolatum-mineral oil   Topical TID PRN HOLLI Lion           Risks / Benefits of Treatment:    Risks, benefits, and possible side effects of medications explained to patient. Patient does not verbalize understanding of risks and benefits of treatment at this time and will require further explanation.      Counseling / Coordination of Care:    Patient's progress discussed with staff in treatment team meeting.  Medications, treatment progress and treatment plan reviewed with patient.    Note Share    This note was not shared with the patient due to reasonable likelihood of causing patient harm    HOLLI Ghotra 09/01/24

## 2024-09-01 NOTE — NURSING NOTE
"Pt visible on unit. Pt reports AH and \"a little\" depression. Denies all other psych symptoms. Compliant with medications and weekly assessment. Visible in groups. No behaviors. Will maintain q7 min checks.   "

## 2024-09-01 NOTE — PLAN OF CARE
Problem: Alteration in Thoughts and Perception  Goal: Treatment Goal: Gain control of psychotic behaviors/thinking, reduce/eliminate presenting symptoms and demonstrate improved reality functioning upon discharge  Outcome: Progressing  Goal: Verbalize thoughts and feelings  Description: Interventions:  - Promote a nonjudgmental and trusting relationship with the patient through active listening and therapeutic communication  - Assess patient's level of functioning, behavior and potential for risk  - Engage patient in 1 on 1 interactions  - Encourage patient to express fears, feelings, frustrations, and discuss symptoms    - [place] patient to reality, help patient recognize reality-based thinking   - Administer medications as ordered and assess for potential side effects  - Provide the patient education related to the signs and symptoms of the illness and desired effects of prescribed medications  Outcome: Progressing  Goal: Refrain from acting on delusional thinking/internal stimuli  Description: Interventions:  - Monitor patient closely, per order   - Utilize least restrictive measures   - Set reasonable limits, give positive feedback for acceptable   - Administer medications as ordered and monitor of potential side effects  Outcome: Progressing  Goal: Agree to be compliant with medication regime, as prescribed and report medication side effects  Description: Interventions:  - Offer appropriate PRN medication and supervise ingestion; conduct AIMS, as needed   Outcome: Progressing  Goal: Attend and participate in unit activities, including therapeutic, recreational, and educational groups  Description: Interventions:  -Encourage Visitation and family involvement in care  Outcome: Progressing     Problem: Ineffective Coping  Goal: Identifies ineffective coping skills  Outcome: Progressing  Goal: Patient/Family verbalizes awareness of resources  Outcome: Progressing

## 2024-09-02 PROCEDURE — 99232 SBSQ HOSP IP/OBS MODERATE 35: CPT | Performed by: PSYCHIATRY & NEUROLOGY

## 2024-09-02 RX ADMIN — GLYCOPYRROLATE 2 MG: 1 TABLET ORAL at 21:08

## 2024-09-02 RX ADMIN — LORATADINE 10 MG: 10 TABLET ORAL at 09:26

## 2024-09-02 RX ADMIN — FAMOTIDINE 20 MG: 20 TABLET ORAL at 17:17

## 2024-09-02 RX ADMIN — PROPRANOLOL HYDROCHLORIDE 10 MG: 10 TABLET ORAL at 21:09

## 2024-09-02 RX ADMIN — HYDROCHLOROTHIAZIDE 12.5 MG: 12.5 TABLET ORAL at 09:26

## 2024-09-02 RX ADMIN — FAMOTIDINE 20 MG: 20 TABLET ORAL at 09:26

## 2024-09-02 RX ADMIN — PANTOPRAZOLE SODIUM 20 MG: 20 TABLET, DELAYED RELEASE ORAL at 09:26

## 2024-09-02 RX ADMIN — NICOTINE POLACRILEX 2 MG: 2 GUM, CHEWING ORAL at 21:09

## 2024-09-02 RX ADMIN — SENNOSIDES AND DOCUSATE SODIUM 2 TABLET: 8.6; 5 TABLET ORAL at 21:09

## 2024-09-02 RX ADMIN — NICOTINE POLACRILEX 2 MG: 2 GUM, CHEWING ORAL at 17:26

## 2024-09-02 RX ADMIN — CLOZAPINE 300 MG: 200 TABLET ORAL at 21:09

## 2024-09-02 RX ADMIN — PROPRANOLOL HYDROCHLORIDE 10 MG: 10 TABLET ORAL at 09:26

## 2024-09-02 RX ADMIN — NICOTINE POLACRILEX 2 MG: 2 GUM, CHEWING ORAL at 13:08

## 2024-09-02 RX ADMIN — CLOZAPINE 200 MG: 200 TABLET ORAL at 15:05

## 2024-09-02 RX ADMIN — METFORMIN HYDROCHLORIDE 500 MG: 500 TABLET, FILM COATED ORAL at 17:17

## 2024-09-02 RX ADMIN — GLYCOPYRROLATE 1 MG: 1 TABLET ORAL at 15:08

## 2024-09-02 RX ADMIN — AMLODIPINE BESYLATE 5 MG: 5 TABLET ORAL at 09:26

## 2024-09-02 RX ADMIN — METFORMIN HYDROCHLORIDE 500 MG: 500 TABLET, FILM COATED ORAL at 09:26

## 2024-09-02 RX ADMIN — CYANOCOBALAMIN TAB 1000 MCG 1000 MCG: 1000 TAB at 09:26

## 2024-09-02 RX ADMIN — NICOTINE POLACRILEX 2 MG: 2 GUM, CHEWING ORAL at 09:33

## 2024-09-02 RX ADMIN — ESCITALOPRAM OXALATE 20 MG: 10 TABLET, FILM COATED ORAL at 09:26

## 2024-09-02 RX ADMIN — GLYCOPYRROLATE 1 MG: 1 TABLET ORAL at 09:26

## 2024-09-02 RX ADMIN — ARIPIPRAZOLE 15 MG: 15 TABLET ORAL at 09:26

## 2024-09-02 NOTE — NURSING NOTE
Visible in milieu. Pleasant & cooperative. Hanging out with select peer most of the evening. No behavior issues. Nicorette gum at [id number] &[id number]. Compliant with meals and meds.Denies psychiatric symptoms No behavior issues.

## 2024-09-02 NOTE — NURSING NOTE
Patient slept through breakfast even with several attempts to wake him. He is quiet has good eye contact and interacts with write.  Patient was compliant with medications. He is social with select peers. No behavior issues.

## 2024-09-02 NOTE — PLAN OF CARE
Problem: Depression  Goal: Refrain from harming self  Description: Interventions:  - Monitor patient closely, per order   - Supervise medication ingestion, monitor effects and side effects   Outcome: Progressing  Goal: Refrain from isolation  Description: Interventions:  - Develop a trusting relationship   - Encourage socialization   Outcome: Progressing  Goal: Refrain from self-neglect  Outcome: Progressing     Problem: Anxiety  Goal: Anxiety is at manageable level  Description: Interventions:  - Assess and monitor patient's anxiety level.   - Monitor for signs and symptoms (heart palpitations, chest pain, shortness of breath, headaches, nausea, feeling jumpy, restlessness, irritable, apprehensive).   - Collaborate with interdisciplinary team and initiate plan and interventions as ordered.  - [place] patient to unit/surroundings  - Explain treatment plan  - Encourage participation in care  - Encourage verbalization of concerns/fears  - Identify coping mechanisms  - Assist in developing anxiety-reducing skills  - Administer/offer alternative therapies  - Limit or eliminate stimulants  Outcome: Progressing     Problem: Alteration in Orientation  Goal: Interact with staff daily  Description: Interventions:  - Assess and re-assess patient's level of orientation  - Engage patient in 1 on 1 interactions, daily, for a minimum of 15 minutes   - Establish rapport/trust with patient   Outcome: Progressing  Goal: Allow medical examinations, as recommended  Description: Interventions:  - Provide physical/neurological exams and/or referrals, per provider   Outcome: Progressing     Problem: Electroconvulsive therapy (ECT)  Goal: Treatment Goal: Demonstrate a reduction of confusion and improved orientation to person, place, time and/or situation upon discharge, according to optimum baseline/ability  Outcome: Progressing  Goal: Achieves stable or improved neurological status  Description: INTERVENTIONS  - Monitor and report  changes in neurological status  - Monitor vital signs such as temperature, blood pressure, glucose, and any other labs ordered   - Initiate measures to prevent increased intracranial pressure  - Monitor for seizure activity and implement precautions if appropriate      Outcome: Progressing  Goal: Maintain or return mobility to safest level of function  Description: INTERVENTIONS:  - Assess patient's ability to carry out ADLs; assess patient's baseline for ADL function and identify physical deficits which impact ability to perform ADLs (bathing, care of mouth/teeth, toileting, grooming, dressing, etc.)  - Assess/evaluate cause of self-care deficits   - Assess range of motion  - Assess patient's mobility  - Assess patient's need for assistive devices and provide as appropriate  - Encourage maximum independence but intervene and supervise when necessary  - Involve family in performance of ADLs  - Assess for home care needs following discharge   - Consider OT consult to assist with ADL evaluation and planning for discharge  - Provide patient education as appropriate  Outcome: Progressing  Goal: Absence of urinary retention  Description: INTERVENTIONS:  - Assess patient’s ability to void and empty bladder  - Monitor I/O  - Bladder scan as needed  - Discuss with physician/AP medications to alleviate retention as needed  - Discuss catheterization for long term situations as appropriate  Outcome: Progressing  Goal: Minimal or absence of nausea and/or vomiting  Description: INTERVENTIONS:  - Administer IV fluids if ordered to ensure adequate hydration  - Maintain NPO status until nausea and vomiting are resolved  - Nasogastric tube if ordered  - Administer ordered antiemetic medications as needed  - Provide nonpharmacologic comfort measures as appropriate  - Advance diet as tolerated, if ordered  - Consider nutrition services referral to assist patient with adequate nutrition and appropriate food choices  Outcome:  Progressing  Goal: Maintains adequate nutritional intake  Description: INTERVENTIONS:  - Monitor percentage of each meal consumed  - Identify factors contributing to decreased intake, treat as appropriate  - Assist with meals as needed  - Monitor I&O, weight, and lab values if indicated  - Obtain nutrition services referral as needed  Outcome: Progressing

## 2024-09-02 NOTE — PROGRESS NOTES
"Progress Note - Behavioral Health   Alberto Berumen 28 y.o. male MRN: [id number]  Unit/Bed#: [facility] 108-02 Encounter: [id number]      Subjective:     Documentation, nursing notes, medication reconciliation, labs, and vitals reviewed. Patient was seen for continuing care and reviewed with treatment team.  No acute events over the past 24 hours. Per nursing report, patient remains in behavioral control.  When not sleeping, patient is observed often spending time with select peer on the unit, walking halls when they walk, standing outside of their door requesting to spend time together; also, remains social with other peers on unit and staff. No medication changes over the past 24 hours.  Last BM-9/1/2024    On evaluation today, Alberto is laying in bed resting.  Currently rates depressive symptoms 6/10 (10 being worst) adamantly denying any thoughts of self-harm or suicide.  Reports that he enjoys spending time with peers on the unit and walking around.  Auditory  hallucinations \"come and go\" with mild improvement in symptoms.  He does not currently appear to be responding to internal stimuli, however staff has observed this.  He does not appear overtly anxious.  Not agitated or aggressive.  No thoughts to harm others.  Tolerating medications well with fatigue noted as a side effect.  No other questions or concerns.    Psychiatric ROS:  Behavior over the last 24 hours: some improvement  Sleep: hypersomnia  Appetite: normal  Medication side effects: Yes-tiredness  ROS: all other systems are negative      Mental Status Evaluation:    Appearance:  disheveled   Behavior:  cooperative, calm   Speech:  normal rate, soft   Mood:  depressed   Affect:  constricted   Thought Process:  concrete   Associations: concrete associations   Thought Content:  paranoid ideation   Perceptual Disturbances: auditory hallucinations, appears preoccupied   Risk Potential: Suicidal ideation - None  Homicidal ideation - None  Potential for " aggression - No   Sensorium:  oriented to person, place, and time/date   Memory:  recent and remote memory grossly intact   Consciousness:  awake   Attention/Concentration: attention span and concentration appear shorter than expected for age   Insight:  limited   Judgment: limited   Gait/Station: in bed   Motor Activity: no abnormal movements       Vital signs in last 24 hours:    Temp:  [97.6 °F (36.4 °C)-97.9 °F (36.6 °C)] 97.6 °F (36.4 °C)  HR:  [90-97] 90  Resp:  [18] 18  BP: (123-148)/(63-97) 123/63    Laboratory results: I have personally reviewed all pertinent laboratory/tests results    Results from the past 24 hours: No results found for this or any previous visit (from the past 24 hour(s)).      Progress Toward Goals: making slow progress    Suicide/Homicide Risk Assessment:    Risk of Harm to Self:   Nursing Suicide Risk Assessment Last 24 hours: C-SSRS Risk (Since Last Contact)  Calculated C-SSRS Risk Score (Since Last Contact): No Risk Indicated    Risk of Harm to Others:  Nursing Homicide Risk Assessment: Violence Risk to Others: Denies within past 6 months    Assessment & Plan   Principal Problem:    Schizoaffective disorder, bipolar type (HCC)  Active Problems:    GERD (gastroesophageal reflux disease)    Medical clearance for psychiatric admission    Tobacco abuse    T wave inversion in EKG    Chronic idiopathic constipation    Confluent and reticulate papillomatosis    Primary hypertension    Elevated hemoglobin A1c    Bilateral lower extremity edema      Recommended Treatment:     Planned medication and treatment changes:    All current active medications have been reviewed  Encourage group therapy, milieu therapy and occupational therapy  Behavioral Health checks every 7 minutes  Continue with SLIM medical management as indicated  Disposition planning ongoing per primary team; 201 commitment status      Continue current medications:    Current Facility-Administered Medications   Medication Dose  Route Frequency Provider Last Rate    acetaminophen  650 mg Oral Q4H PRN Jordan C Holter, DO      acetaminophen  650 mg Oral Q6H PRN HOLLI Lion      aluminum-magnesium hydroxide-simethicone  30 mL Oral Q4H PRN Jordan C Holter, DO      amLODIPine  5 mg Oral Daily HOLLI Lion      ARIPiprazole  15 mg Oral Daily Bora Rosario MD      Artificial Tears  1 drop Both Eyes Q3H PRN Jordan C Holter, DO      atropine  1 drop Sublingual Daily PRN HOLLI Lion      haloperidol lactate  2.5 mg Intramuscular Q4H PRN Max 4/day STEVE LionNP      And    LORazepam  1 mg Intramuscular Q4H PRN Max 4/day HOLLI Lion      And    benztropine  0.5 mg Intramuscular Q4H PRN Max 4/day HOLLI Lion      benztropine  1 mg Intramuscular Q4H PRN Max 6/day Jordan C Holter, DO      haloperidol lactate  5 mg Intramuscular Q4H PRN Max 4/day HOLLI Lion      And    LORazepam  2 mg Intramuscular Q4H PRN Max 4/day HOLLI Lion      And    benztropine  1 mg Intramuscular Q4H PRN Max 4/day HOLLI Lion      benztropine  1 mg Oral Q4H PRN Max 6/day HOLLI Lion      benztropine  1 mg Oral Q4H PRN Max 6/day Jordan C Holter, DO      bisacodyl  10 mg Rectal Daily PRN HOLLI Lion      calcium carbonate  500 mg Oral BID PRN HOLLI Monge      cloZAPine  200 mg Oral Before Dinner Bora Rosario MD      cloZAPine  300 mg Oral HS Bora Rosario MD      cyanocobalamin  1,000 mcg Oral Daily HOLLI Galvan      hydrOXYzine HCL  50 mg Oral Q6H PRN Max 4/day Jordan C Holter, DO      Or    diphenhydrAMINE  50 mg Intramuscular Q6H PRN Jordan C Holter, DO      hydrOXYzine HCL  50 mg Oral Q6H PRN Max 4/day HOLLI Lion      Or    diphenhydrAMINE  50 mg Intramuscular Q6H PRN HOLLI Lion      diphenhydrAMINE-zinc acetate   Topical BID PRN Eveline Hangey, CRNP      escitalopram  20 mg Oral Daily Eveline Hunt, CRNP      famotidine  20 mg Oral BID Eileen Jensen,  CRNP      fluticasone  1 spray Each Nare Daily Brina Guillen MD      glycopyrrolate  1 mg Oral BID (AM & Afternoon) Bora Rosario MD      glycopyrrolate  2 mg Oral HS Bora Rosario MD      haloperidol  1 mg Oral Q6H PRN HOLLI Lion      haloperidol  2.5 mg Oral Q4H PRN Max 4/day HOLLI Lion      haloperidol  5 mg Oral Q4H PRN Max 4/day HOLLI Lion      hydroCHLOROthiazide  12.5 mg Oral Daily HOLLI Galvan      hydrocortisone   Topical 4x Daily PRN HOLLI Lion      hydrOXYzine HCL  100 mg Oral Q6H PRN Max 4/day [facility] Holter, DO      Or    LORazepam  2 mg Intramuscular Q6H PRN [facility] Holter, DO      hydrOXYzine HCL  100 mg Oral Q6H PRN Max 4/day HOLLI Lion      Or    LORazepam  2 mg Intramuscular Q6H PRN HOLLI Lion      hydrOXYzine HCL  25 mg Oral Q6H PRN Max 4/day [facility] Holter, DO      ibuprofen  600 mg Oral Q8H PRN HOLLI Lion      lactated ringers  50 mL/hr Intravenous Continuous Antwan Dong MD      loratadine  10 mg Oral Daily Brina Guillen MD      melatonin  3 mg Oral HS PRN Bora Rosario MD      metFORMIN  500 mg Oral BID With Meals HOLLI Galvan      methocarbamol  500 mg Oral Q6H PRN HOLLI Lion      nicotine polacrilex  2 mg Oral Q4H PRN Bora Rosario MD      OLANZapine  5 mg Oral Q4H PRN Max 3/day [facility] Holter, DO      Or    OLANZapine  2.5 mg Intramuscular Q4H PRN Max 3/day [facility] Holter, DO      OLANZapine  5 mg Oral Q3H PRN Max 3/day [facility] Holter, DO      Or    OLANZapine  5 mg Intramuscular Q3H PRN Max 3/day [facility] Holter, DO      OLANZapine  2.5 mg Oral Q4H PRN Max 6/day [facility] Holter, DO      ondansetron  4 mg Oral Q6H PRN HOLLI Lion      pantoprazole  [facility]      polyethylene glycol  17 g Oral Daily PRN HOLLI Ghotra      propranolol  10 mg Oral Q12H [facility] HOLLI Lion-docusate sodium  1 tablet Oral Daily PRN Jordan C Holter, DO       senna-docusate sodium  2 tablet Oral HS Melvina De Jesus DO      traZODone  50 mg Oral HS PRN HOLLI Lion      white petrolatum-mineral oil   Topical TID PRN HOLLI Lion           Risks / Benefits of Treatment:    Risks, benefits, and possible side effects of medications explained to patient. Patient does not verbalize understanding of risks and benefits of treatment at this time and will require further explanation.      Counseling / Coordination of Care:    Patient's progress discussed with staff in treatment team meeting.  Medications, treatment progress and treatment plan reviewed with patient.    Note Share    This note was not shared with the patient due to reasonable likelihood of causing patient harm    HOLLI Ghotra 09/02/24

## 2024-09-02 NOTE — NURSING NOTE
"Pt is calm and cooperative, visible on the unit doing a puzzle with peer and staff. Pt reports feeling depressed, reports AH states \"voices are telling me they are going to kill me when I get out the hospital\". Support provided. Pt denies SI/HI/VH. Received Nicorette gum at 1726. Pt ate 50% of dinner, medication complaint, safety checks ongoing.   "

## 2024-09-03 PROCEDURE — 99232 SBSQ HOSP IP/OBS MODERATE 35: CPT | Performed by: PSYCHIATRY & NEUROLOGY

## 2024-09-03 RX ADMIN — GLYCOPYRROLATE 2 MG: 1 TABLET ORAL at 21:20

## 2024-09-03 RX ADMIN — METFORMIN HYDROCHLORIDE 500 MG: 500 TABLET, FILM COATED ORAL at 08:45

## 2024-09-03 RX ADMIN — CLOZAPINE 300 MG: 200 TABLET ORAL at 21:20

## 2024-09-03 RX ADMIN — NICOTINE POLACRILEX 2 MG: 2 GUM, CHEWING ORAL at 09:19

## 2024-09-03 RX ADMIN — PROPRANOLOL HYDROCHLORIDE 10 MG: 10 TABLET ORAL at 08:56

## 2024-09-03 RX ADMIN — ESCITALOPRAM OXALATE 20 MG: 10 TABLET, FILM COATED ORAL at 08:46

## 2024-09-03 RX ADMIN — POLYETHYLENE GLYCOL 3350 17 G: 17 POWDER, FOR SOLUTION ORAL at 10:22

## 2024-09-03 RX ADMIN — SENNOSIDES AND DOCUSATE SODIUM 2 TABLET: 8.6; 5 TABLET ORAL at 21:20

## 2024-09-03 RX ADMIN — NICOTINE POLACRILEX 2 MG: 2 GUM, CHEWING ORAL at 17:43

## 2024-09-03 RX ADMIN — PANTOPRAZOLE SODIUM 20 MG: 20 TABLET, DELAYED RELEASE ORAL at 08:45

## 2024-09-03 RX ADMIN — NICOTINE POLACRILEX 2 MG: 2 GUM, CHEWING ORAL at 21:43

## 2024-09-03 RX ADMIN — METFORMIN HYDROCHLORIDE 500 MG: 500 TABLET, FILM COATED ORAL at 16:13

## 2024-09-03 RX ADMIN — FAMOTIDINE 20 MG: 20 TABLET ORAL at 08:45

## 2024-09-03 RX ADMIN — PROPRANOLOL HYDROCHLORIDE 10 MG: 10 TABLET ORAL at 21:20

## 2024-09-03 RX ADMIN — HYDROCHLOROTHIAZIDE 12.5 MG: 12.5 TABLET ORAL at 08:45

## 2024-09-03 RX ADMIN — FAMOTIDINE 20 MG: 20 TABLET ORAL at 17:35

## 2024-09-03 RX ADMIN — CLOZAPINE 200 MG: 200 TABLET ORAL at 16:13

## 2024-09-03 RX ADMIN — LORATADINE 10 MG: 10 TABLET ORAL at 08:45

## 2024-09-03 RX ADMIN — GLYCOPYRROLATE 1 MG: 1 TABLET ORAL at 14:48

## 2024-09-03 RX ADMIN — ARIPIPRAZOLE 15 MG: 15 TABLET ORAL at 08:46

## 2024-09-03 RX ADMIN — CYANOCOBALAMIN TAB 1000 MCG 1000 MCG: 1000 TAB at 08:46

## 2024-09-03 RX ADMIN — GLYCOPYRROLATE 1 MG: 1 TABLET ORAL at 08:45

## 2024-09-03 RX ADMIN — AMLODIPINE BESYLATE 5 MG: 5 TABLET ORAL at 08:56

## 2024-09-03 NOTE — NURSING NOTE
"Alberto was calm and cooperative during assessment. He reports moderate depression related to AH stating that \"it'll kill me and my entire family.\" He reports that he is able to focus on coping skills and peer interactions during group programming. He denies anxiety, SI, SH, HI, and VH. He is medication compliant. He requested PRN miralax to help move his bowels. Alberto stated that his goal for today was to \"stay out of his head,\" and stated that he was doing so by using his coping skills including listening to music and talking to peers and also friends on the phone. He denies new concerns at this time.   "

## 2024-09-03 NOTE — PROGRESS NOTES
09/03/24 1003   Team Meeting   Meeting Type Tx Team Meeting   Initial Conference Date 09/03/24   Next Conference Date 09/16/24   Team Members Present   Team Members Present Physician;Nurse;;Other (Discipline and Name)   Physician Team Member Abraham   Nursing Team Member Claudia   Social Work Team Member Jenny   Other (Discipline and Name) Sharlene Griffin CMPMHDS   Patient/Family Present   Patient Present Yes   Patient's Family Present No   OTHER   Team Meeting - Additional Comments Pt attended tx team meeting. Pt reports ongoing AH; denies other concerns. Pt has one more ECT next Friday 9/13. Pt still determining if he wants to push for family to take him in or wait on CRR availability.

## 2024-09-03 NOTE — NURSING NOTE
Alberto requested Miralax PRN for BM difficulty; stated it was helpful before. No other concerns at this time.

## 2024-09-03 NOTE — NURSING NOTE
Alberto maintained on ongoing SAFE precaution without incident on this shift.  Awake, alert , visible, withdrawn, and cooperative upon approach.   Attended and participated in 5 out of 8 groups today.  Continues to be compliant with medication regimen and had snack.  No inappropriate contact between him and a female peer noted.  Denies all psych symptoms

## 2024-09-03 NOTE — PLAN OF CARE
Problem: Alteration in Thoughts and Perception  Goal: Treatment Goal: Gain control of psychotic behaviors/thinking, reduce/eliminate presenting symptoms and demonstrate improved reality functioning upon discharge  Outcome: Progressing  Goal: Verbalize thoughts and feelings  Description: Interventions:  - Promote a nonjudgmental and trusting relationship with the patient through active listening and therapeutic communication  - Assess patient's level of functioning, behavior and potential for risk  - Engage patient in 1 on 1 interactions  - Encourage patient to express fears, feelings, frustrations, and discuss symptoms    - [place] patient to reality, help patient recognize reality-based thinking   - Administer medications as ordered and assess for potential side effects  - Provide the patient education related to the signs and symptoms of the illness and desired effects of prescribed medications  Outcome: Progressing  Goal: Refrain from acting on delusional thinking/internal stimuli  Description: Interventions:  - Monitor patient closely, per order   - Utilize least restrictive measures   - Set reasonable limits, give positive feedback for acceptable   - Administer medications as ordered and monitor of potential side effects  Outcome: Progressing  Goal: Agree to be compliant with medication regime, as prescribed and report medication side effects  Description: Interventions:  - Offer appropriate PRN medication and supervise ingestion; conduct AIMS, as needed   Outcome: Progressing  Goal: Attend and participate in unit activities, including therapeutic, recreational, and educational groups  Description: Interventions:  -Encourage Visitation and family involvement in care  Outcome: Progressing  Goal: Recognize dysfunctional thoughts, communicate reality-based thoughts at the time of discharge  Description: Interventions:  - Provide medication and psycho-education to assist patient in compliance and developing  insight into his/her illness   Outcome: Progressing     Problem: Ineffective Coping  Goal: Identifies ineffective coping skills  Outcome: Progressing  Goal: Identifies healthy coping skills  Outcome: Progressing     Problem: Depression  Goal: Treatment Goal: Demonstrate behavioral control of depressive symptoms, verbalize feelings of improved mood/affect, and adopt new coping skills prior to discharge  Outcome: Progressing     Problem: Anxiety  Goal: Anxiety is at manageable level  Description: Interventions:  - Assess and monitor patient's anxiety level.   - Monitor for signs and symptoms (heart palpitations, chest pain, shortness of breath, headaches, nausea, feeling jumpy, restlessness, irritable, apprehensive).   - Collaborate with interdisciplinary team and initiate plan and interventions as ordered.  - [place] patient to unit/surroundings  - Explain treatment plan  - Encourage participation in care  - Encourage verbalization of concerns/fears  - Identify coping mechanisms  - Assist in developing anxiety-reducing skills  - Administer/offer alternative therapies  - Limit or eliminate stimulants  Outcome: Progressing

## 2024-09-03 NOTE — SOCIAL WORK
SW placed call to pt's aunt Hanh; left voicemail with update on pt and regarding unit phones, requested call back.

## 2024-09-03 NOTE — PROGRESS NOTES
"Psychiatry Progress Note [facility]    Alberto Berumen 28 y.o. male MRN: [id number]  Unit/Bed#: [facility] 108-02 Encounter: [id number]  Code Status: Level 1 - Full Code    PCP: Joce Juan MD    Date of Admission:  3/29/2024 2008   Date of Service:  09/03/24    Patient Active Problem List   Diagnosis    GERD (gastroesophageal reflux disease)    Medical clearance for psychiatric admission    Schizoaffective disorder, bipolar type ([facility])    Tobacco abuse    T wave inversion in EKG    Syringoma    Chronic idiopathic constipation    Vitamin B 12 deficiency    Vitamin D deficiency    Confluent and reticulate papillomatosis    Class 2 obesity in adult    Primary hypertension    Elevated hemoglobin A1c    Bilateral lower extremity edema         Review of systems: Patient endorsing continued auditory hallucinations, denies overt issues from ECT, believes this to be helping, acknowledges he needs to contact aunt to discuss discharge.  Psychiatric Diagnosis: schizoaffective disorder, bipolar type     Assessment  Overall Status: considering group home vs living with aunt. Continues to hear threatening voices more than 10 times per day. Continues to receive ECT, last treatment was Friday 8/30.    Certification Statement: The patient will continue to require additional inpatient hospital stay due to continued threatening voices and minimal response to ECT and medications        Medications:   Continue abilify 15mg po daily  Continue Clozaril 200mg po at dinner and 300mg po HS  Continine B12 1000mcg po daily  Continue lexapro 20mg po daily  Continue glycopyrrolate 1mg BID po and 2mg HS  Continue propanolol 10mg po BID  Continue senna docusate sodium 2 tablet HS  Side effects from treatment: endorses drooling and \"twitching\" intermittently (not observed)   Medication changes   None, reminded patient he has PRN access to mouth drops for drooling   Medication education   Risks side effects benefits and " "precautions of medications discussed with patient and he did verbalize an understanding about risks for metabolic syndrome from being on neuroleptics and is form tardive dyskinesia etc.  All medications reviewed and I recommend that they be continued for symptom management   Understanding of medications: verbalized understanding    Justification for dual anti-psychotics: NA     Non-pharmacological treatments  Continue with individual, group, milieu and occupational therapy using recovery principles and psycho-education about accepting illness and the need for treatment.  Continue maintenance ECT Q2 weekly x 6mo  Continue exploring dispo    Safety  Safety and communication plan established to target dynamic risk factors discussed above.    Discharge Plan   Group home or with aunt    Interval Progress   Discusses today phones were down so unable to contact aunt, otherwise continues to hear voices telling him that they will \"kill me when I leave here\" at least 10 times per day. States that he is also \"doing better but trying to stay out of my head\". Denies depression, denies SI, acknowledges that he needs to call aunt for dispo.   Acceptance by patient: accepting    Hopefulness in recovery: hoping can live with aunt   Involved in reintegration process: aunt   Trusting in relationship with psychiatrist: yes  Sleep: WNL  Appetite: WNL  Compliance with Medications: compliant  Group attendance: about half of groups 5/10  Significant events: none noted      Mental Status Exam  Appearance: casually dressed, marginal hygiene, overweight  Behavior: cooperative, calm, guarded  Speech: normal rate, normal volume, normal pitch  Mood: euthymic  Affect: blunted  Thought Process: concrete  Thought Content: no overt delusions, mild paranoia, denies SI, HI.  Perceptual Disturbances: no visual hallucinations, auditory hallucinations of voices threatening to hurt him when he leaves the hospital, appears distracted  Hx Risk Factors: " chronic psychiatric problems, chronic anxiety symptoms, history of psychosis  Sensorium: oriented to person, place, and situation  Cognition: recent and remote memory grossly intact  Consciousness: alert and awake  Attention: attention span and concentration are age appropriate  Intellect: appears to be of average intelligence  Insight: limited  Judgement: limited  Motor Activity: no abnormal movements     Vitals  Temp:  [97.6 °F (36.4 °C)] 97.6 °F (36.4 °C)  HR:  [] 107  Resp:  [18] 18  BP: (134-142)/(74-83) 135/74  SpO2:  [99 %] 99 %  No intake or output data in the 24 hours ending 09/03/24 1311    Lab Results: All Labs For Current Hospital Admission Reviewed  Results from last 7 days   Lab Units 08/29/24 2022   WBC Thousand/uL 11.55*   RBC Million/uL 5.32   HEMOGLOBIN g/dL 12.9   HEMATOCRIT % 42.0   MCV fL 79*   PLATELETS Thousands/uL 246   TOTAL NEUT ABS Thousands/µL 6.36   CLOZAPINE LVL ng/mL 663       Current Facility-Administered Medications   Medication Dose Route Frequency Provider Last Rate    acetaminophen  650 mg Oral Q4H PRN Jordan C Holter,       acetaminophen  650 mg Oral Q6H PRN HOLLI Lion      aluminum-magnesium hydroxide-simethicone  30 mL Oral Q4H PRN Jordan C Holter,       amLODIPine  5 mg Oral Daily HOLLI Lion      ARIPiprazole  15 mg Oral Daily Bora Rosario MD      Artificial Tears  1 drop Both Eyes Q3H PRN Jordan C Holter, DO      atropine  1 drop Sublingual Daily PRN HLOLI Lion      haloperidol lactate  2.5 mg Intramuscular Q4H PRN Max 4/day HOLLI Lion      And    LORazepam  1 mg Intramuscular Q4H PRN Max 4/day HOLLI Lion      And    benztropine  0.5 mg Intramuscular Q4H PRN Max 4/day HOLLI Lion      benztropine  1 mg Intramuscular Q4H PRN Max 6/day Jordan C Holter,       haloperidol lactate  5 mg Intramuscular Q4H PRN Max 4/day HOLLI Lion      And    LORazepam  2 mg Intramuscular Q4H PRN Max 4/day HOLLI Lion       And    benztropine  1 mg Intramuscular Q4H PRN Max 4/day HOLLI Lion      benztropine  1 mg Oral Q4H PRN Max 6/day HOLLI Lion      benztropine  1 mg Oral Q4H PRN Max 6/day Ion FISCHER Holter, DO      bisacodyl  10 mg Rectal Daily PRN HOLLI Lion      calcium carbonate  500 mg Oral BID PRN HOLLI Monge      cloZAPine  200 mg Oral Before Dinner Bora Rosario MD      cloZAPine  300 mg Oral HS Bora Rosario MD      cyanocobalamin  1,000 mcg Oral Daily HOLLI Galvan      hydrOXYzine HCL  50 mg Oral Q6H PRN Max 4/day Jordan C Holter, DO      Or    diphenhydrAMINE  50 mg Intramuscular Q6H PRN Jordan C Holter, DO      hydrOXYzine HCL  50 mg Oral Q6H PRN Max 4/day HOLLI Lion      Or    diphenhydrAMINE  50 mg Intramuscular Q6H PRN HOLLI Lion      diphenhydrAMINE-zinc acetate   Topical BID PRN HOLLI Lion      escitalopram  20 mg Oral Daily HOLLI Lion      famotidine  20 mg Oral BID HOLLI Monge      fluticasone  1 spray Each Nare Daily Brina Guillen MD      glycopyrrolate  1 mg Oral BID (AM & Afternoon) Bora Rosario MD      glycopyrrolate  2 mg Oral HS Bora Rosario MD      haloperidol  1 mg Oral Q6H PRN HOLLI Lion      haloperidol  2.5 mg Oral Q4H PRN Max 4/day HOLLI Lion      haloperidol  5 mg Oral Q4H PRN Max 4/day HOLLI Lion      hydroCHLOROthiazide  12.5 mg Oral Daily HOLLI Galvan      hydrocortisone   Topical 4x Daily PRN HOLLI Lion      hydrOXYzine HCL  100 mg Oral Q6H PRN Max 4/day Ion FISCHER Holter, DO      Or    LORazepam  2 mg Intramuscular Q6H PRN Ion FISCHER Holter, DO      hydrOXYzine HCL  100 mg Oral Q6H PRN Max 4/day HOLLI Lion      Or    LORazepam  2 mg Intramuscular Q6H PRN HOLLI Lion      hydrOXYzine HCL  25 mg Oral Q6H PRN Max 4/day Jordan C Holter,       ibuprofen  600 mg Oral Q8H PRN HOLLI Lion      lactated ringers  50 mL/hr Intravenous  Continuous Antwan Dong MD      loratadine  10 mg Oral Daily Brina Guillen MD      melatonin  3 mg Oral HS PRN Bora Rosario MD      metFORMIN  500 mg Oral BID With Meals HOLLI Galvan      methocarbamol  500 mg Oral Q6H PRN HOLLI Lion      nicotine polacrilex  2 mg Oral Q4H PRN Bora Rosario MD      OLANZapine  5 mg Oral Q4H PRN Max 3/day [facility] Holter, DO      Or    OLANZapine  2.5 mg Intramuscular Q4H PRN Max 3/day [facility] Holter, DO      OLANZapine  5 mg Oral Q3H PRN Max 3/day [facility] Holter, DO      Or    OLANZapine  5 mg Intramuscular Q3H PRN Max 3/day [facility] Holter, DO      OLANZapine  2.5 mg Oral Q4H PRN Max 6/day [facility] Holter, DO      ondansetron  4 mg Oral Q6H PRN HOLLI Lion      pantoprazole  20 mg Oral QAM [facility] Holter, DO      polyethylene glycol  17 g Oral Daily PRN HOLLI Ghotra      propranolol  10 mg Oral Q12H KAYLIE HOLLI Lion      senna-docusate sodium  1 tablet Oral Daily PRN [facility] Holter, DO      senna-docusate sodium  2 tablet Oral HS Melvina Ambron, DO      traZODone  50 mg Oral HS PRN HOLLI Lion      white petrolatum-mineral oil   Topical TID PRN HOLLI Lion         Counseling / Coordination of Care: Total floor / unit time spent today 15 minutes. Greater than 50% of total time was spent with the patient and / or family counseling and / or somewhat receptive to supportive listening and teaching positive coping skills to deal with symptom mangement.     Patient's Rights, confidentiality and exceptions to confidentiality, use of automated medical record, Behavioral [facility] Services staff access to medical record, and consent to treatment reviewed.    This note has been dictated and hence there may be problems with punctuation, spelling and formatting and if anyone has any concerns please address them to the writer.   This note is not shared with patient due to potential for making patient's condition worse by knowing the  content of the note.    Harjeet Torres, DO

## 2024-09-03 NOTE — PROGRESS NOTES
09/03/24 0748   Team Meeting   Meeting Type Daily Rounds   Team Members Present   Team Members Present Physician;Nurse;;Other (Discipline and Name)   Patient/Family Present   Patient Present No   Patient's Family Present No     In attendance:  Dr. Alex Thomas, MD Dr. Jordan Holter, DO Guy Taylor, RN  Luisana Alvarado, [facility]  Irais Mcdowell M.S.    Groups: 5/10    Pt denies symptoms other than ongoing AH that have not changed. Pt on waitlist for CRR placement. Pt received Maalox for indigestion yesterday. No bx issues noted.

## 2024-09-03 NOTE — SOCIAL WORK
BRANDY placed call to pt's sister Marleny and notified her of the issue with the unit phones. Virtual visit set up for tonight, 9/3 at 6pm.

## 2024-09-04 PROCEDURE — 99232 SBSQ HOSP IP/OBS MODERATE 35: CPT | Performed by: PSYCHIATRY & NEUROLOGY

## 2024-09-04 RX ADMIN — SENNOSIDES AND DOCUSATE SODIUM 2 TABLET: 8.6; 5 TABLET ORAL at 21:13

## 2024-09-04 RX ADMIN — NICOTINE POLACRILEX 2 MG: 2 GUM, CHEWING ORAL at 08:40

## 2024-09-04 RX ADMIN — FAMOTIDINE 20 MG: 20 TABLET ORAL at 08:40

## 2024-09-04 RX ADMIN — AMLODIPINE BESYLATE 5 MG: 5 TABLET ORAL at 08:40

## 2024-09-04 RX ADMIN — METFORMIN HYDROCHLORIDE 500 MG: 500 TABLET, FILM COATED ORAL at 17:05

## 2024-09-04 RX ADMIN — FLUTICASONE PROPIONATE 1 SPRAY: 50 SPRAY, METERED NASAL at 08:42

## 2024-09-04 RX ADMIN — ARIPIPRAZOLE 15 MG: 15 TABLET ORAL at 08:40

## 2024-09-04 RX ADMIN — GLYCOPYRROLATE 1 MG: 1 TABLET ORAL at 15:35

## 2024-09-04 RX ADMIN — PROPRANOLOL HYDROCHLORIDE 10 MG: 10 TABLET ORAL at 08:39

## 2024-09-04 RX ADMIN — HYDROCHLOROTHIAZIDE 12.5 MG: 12.5 TABLET ORAL at 08:40

## 2024-09-04 RX ADMIN — GLYCOPYRROLATE 2 MG: 1 TABLET ORAL at 21:14

## 2024-09-04 RX ADMIN — PANTOPRAZOLE SODIUM 20 MG: 20 TABLET, DELAYED RELEASE ORAL at 08:40

## 2024-09-04 RX ADMIN — CYANOCOBALAMIN TAB 1000 MCG 1000 MCG: 1000 TAB at 08:40

## 2024-09-04 RX ADMIN — GLYCOPYRROLATE 1 MG: 1 TABLET ORAL at 08:39

## 2024-09-04 RX ADMIN — ESCITALOPRAM OXALATE 20 MG: 10 TABLET, FILM COATED ORAL at 08:39

## 2024-09-04 RX ADMIN — CLOZAPINE 200 MG: 200 TABLET ORAL at 17:06

## 2024-09-04 RX ADMIN — NICOTINE POLACRILEX 2 MG: 2 GUM, CHEWING ORAL at 17:05

## 2024-09-04 RX ADMIN — LORATADINE 10 MG: 10 TABLET ORAL at 08:40

## 2024-09-04 RX ADMIN — FAMOTIDINE 20 MG: 20 TABLET ORAL at 17:05

## 2024-09-04 RX ADMIN — PROPRANOLOL HYDROCHLORIDE 10 MG: 10 TABLET ORAL at 21:13

## 2024-09-04 RX ADMIN — NICOTINE POLACRILEX 2 MG: 2 GUM, CHEWING ORAL at 21:14

## 2024-09-04 RX ADMIN — CLOZAPINE 300 MG: 200 TABLET ORAL at 21:14

## 2024-09-04 RX ADMIN — METFORMIN HYDROCHLORIDE 500 MG: 500 TABLET, FILM COATED ORAL at 08:40

## 2024-09-04 NOTE — NURSING NOTE
"Alberto was calm and cooperative during assessment. He endorses mild depression and reports AH saying \"it will kill me and my family.\" He denies anxiety, SI, SH, HI, and VH. Alberto stated that he is able to distract himself from his AH by listening to music, using coping skills, and interacting with peers during group programming and free time. Alberto is social with peers in the milieu. He states for his goal today, \"I want to stay out of my head.\" He denies new concerns at this time.   "

## 2024-09-04 NOTE — PLAN OF CARE
Problem: Alteration in Thoughts and Perception  Goal: Treatment Goal: Gain control of psychotic behaviors/thinking, reduce/eliminate presenting symptoms and demonstrate improved reality functioning upon discharge  Outcome: Progressing  Goal: Verbalize thoughts and feelings  Description: Interventions:  - Promote a nonjudgmental and trusting relationship with the patient through active listening and therapeutic communication  - Assess patient's level of functioning, behavior and potential for risk  - Engage patient in 1 on 1 interactions  - Encourage patient to express fears, feelings, frustrations, and discuss symptoms    - [place] patient to reality, help patient recognize reality-based thinking   - Administer medications as ordered and assess for potential side effects  - Provide the patient education related to the signs and symptoms of the illness and desired effects of prescribed medications  Outcome: Progressing  Goal: Refrain from acting on delusional thinking/internal stimuli  Description: Interventions:  - Monitor patient closely, per order   - Utilize least restrictive measures   - Set reasonable limits, give positive feedback for acceptable   - Administer medications as ordered and monitor of potential side effects  Outcome: Progressing  Goal: Agree to be compliant with medication regime, as prescribed and report medication side effects  Description: Interventions:  - Offer appropriate PRN medication and supervise ingestion; conduct AIMS, as needed   Outcome: Progressing  Goal: Attend and participate in unit activities, including therapeutic, recreational, and educational groups  Description: Interventions:  -Encourage Visitation and family involvement in care  Outcome: Progressing  Goal: Recognize dysfunctional thoughts, communicate reality-based thoughts at the time of discharge  Description: Interventions:  - Provide medication and psycho-education to assist patient in compliance and developing  insight into his/her illness   Outcome: Progressing  Goal: Complete daily ADLs, including personal hygiene independently, as able  Description: Interventions:  - Observe, teach, and assist patient with ADLS  - Monitor and promote a balance of rest/activity, with adequate nutrition and elimination   Outcome: Progressing     Problem: Ineffective Coping  Goal: Participates in unit activities  Description: Interventions:  - Provide therapeutic environment   - Provide required programming   - Redirect inappropriate behaviors   Outcome: Progressing  Goal: Patient/Family participate in treatment and DC plans  Description: Interventions:  - Provide therapeutic environment  Outcome: Progressing  Goal: Patient/Family verbalizes awareness of resources  Outcome: Progressing     Problem: Depression  Goal: Verbalize thoughts and feelings  Description: Interventions:  - Assess and re-assess patient's level of risk   - Engage patient in 1:1 interactions, daily, for a minimum of 15 minutes   - Encourage patient to express feelings, fears, frustrations, hopes   Outcome: Progressing  Goal: Refrain from self-neglect  Outcome: Progressing  Goal: Attend and participate in unit activities, including therapeutic, recreational, and educational groups  Description: Interventions:  - Provide therapeutic and educational activities daily, encourage attendance and participation, and document same in the medical record   Outcome: Progressing     Problem: Anxiety  Goal: Anxiety is at manageable level  Description: Interventions:  - Assess and monitor patient's anxiety level.   - Monitor for signs and symptoms (heart palpitations, chest pain, shortness of breath, headaches, nausea, feeling jumpy, restlessness, irritable, apprehensive).   - Collaborate with interdisciplinary team and initiate plan and interventions as ordered.  - [place] patient to unit/surroundings  - Explain treatment plan  - Encourage participation in care  - Encourage  verbalization of concerns/fears  - Identify coping mechanisms  - Assist in developing anxiety-reducing skills  - Administer/offer alternative therapies  - Limit or eliminate stimulants  Outcome: Progressing     Problem: Alteration in Orientation  Goal: Treatment Goal: Demonstrate a reduction of confusion and improved orientation to person, place, time and/or situation upon discharge, according to optimum baseline/ability  Outcome: Progressing  Goal: Attend and participate in unit activities, including therapeutic, recreational, and educational groups  Description: Interventions:  - Provide therapeutic and educational activities daily, encourage attendance and participation, and document same in the medical record   - Provide appropriate opportunities for reminiscence   - Provide a consistent daily routine   - Encourage family contact/visitation   Outcome: Progressing  Goal: Complete daily ADLs, including personal hygiene independently, as able  Description: Interventions:  - Observe, teach, and assist patient with ADLS  Outcome: Progressing     Problem: DISCHARGE PLANNING - CARE MANAGEMENT  Goal: Discharge to post-acute care or home with appropriate resources  Description: INTERVENTIONS:  - Conduct assessment to determine patient/family and health care team treatment goals, and need for post-acute services based on payer coverage, community resources, and patient preferences, and barriers to discharge  - Address psychosocial, clinical, and financial barriers to discharge as identified in assessment in conjunction with the patient/family and health care team  - Arrange appropriate level of post-acute services according to patient's   needs and preference and payer coverage in collaboration with the physician and health care team  - Communicate with and update the patient/family, physician, and health care team regarding progress on the discharge plan  - Arrange appropriate transportation to post-acute  venues  Outcome: Progressing

## 2024-09-04 NOTE — NURSING NOTE
Received pt in bed at change of shift with eyes closed; chest movement noted.  Continues the same thus this far as per q 7 min room checks.   Will continue to monitor behavior, sleeping pattern and any medical issues that may arise.    0613:  Sleeping 7+ hrs thus this far

## 2024-09-04 NOTE — PROGRESS NOTES
09/04/24 0800   Team Meeting   Meeting Type Daily Rounds   Team Members Present   Team Members Present Physician;Nurse;;Other (Discipline and Name)   Patient/Family Present   Patient Present No   Patient's Family Present No     In attendance:  Dr. Alex Thomas, MD Dr. Jordan Holter, DO Guy Taylor, RN  Luisana Alvarado, [facility]  Irais Mcdowell M.S.    Groups: 9/11    Pt more visible, awake, engaged. Pt reports ongoing AH that are the same. Pt next ECT 9/13. No bx issues. Did virtual visit with family yesterday.

## 2024-09-04 NOTE — SOCIAL WORK
SW placed additional call to pt's aunt Hanh; left voicemail notifying of virtual appt set up for tonight at 6:30 due to issues with the phones on the unit and pt being unable to reach his family this week.

## 2024-09-04 NOTE — NURSING NOTE
Pt is present on the milieu and social with select peers. Pt reports the same AH and denies all other S/S. Pt is polite and cooperative. Brightens on approach. Took HS medications without incidence. No behavioral issues.

## 2024-09-04 NOTE — PROGRESS NOTES
Psychiatry Progress Note [facility]    Alberto Berumen 28 y.o. male MRN: [id number]  Unit/Bed#: [facility] 108-02 Encounter: [id number]  Code Status: Level 1 - Full Code    PCP: Joce Juan MD    Date of Admission:  3/29/2024 2008   Date of Service:  09/04/24    Patient Active Problem List   Diagnosis    GERD (gastroesophageal reflux disease)    Medical clearance for psychiatric admission    Schizoaffective disorder, bipolar type (HCC)    Tobacco abuse    T wave inversion in EKG    Syringoma    Chronic idiopathic constipation    Vitamin B 12 deficiency    Vitamin D deficiency    Confluent and reticulate papillomatosis    Class 2 obesity in adult    Primary hypertension    Elevated hemoglobin A1c    Bilateral lower extremity edema         Review of systems: other systems reviewed unremarkable  Psychiatric Diagnosis: schizoaffective disorder bipolar type     Assessment  Overall Status: considering group home vs living with aunt continues to hear threatening voices but is not as bothered by this, continues to receive maintenance ECT   Certification Statement: The patient will continue to require additional inpatient hospital stay due to continued auditory hallucinations even in the presence of substantial medications and ECT treatment        Medications:   Continue abilify 15mg po daily  Continue Clozaril 200mg po at dinner and 300mg po HS  Continine B12 1000mcg po daily  Continue lexapro 20mg po daily  Continue glycopyrrolate 1mg BID po and 2mg HS  Continue propanolol 10mg po BID  Continue senna docusate sodium 2 tablet HS    Side effects from treatment: continues to endorse drooling, denies others   Medication changes   none   Medication education   Risks side effects benefits and precautions of medications discussed with patient and he did verbalize an understanding about risks for metabolic syndrome from being on neuroleptics and is form tardive dyskinesia etc.  All medications reviewed and I  recommend that they be continued for symptom management   Understanding of medications: verbalizes    Justification for dual anti-psychotics: NA     Non-pharmacological treatments  Continue with individual, group, milieu and occupational therapy using recovery principles and psycho-education about accepting illness and the need for treatment.  Continue maintenance ECT Q2 weekly 6mo  Continue exploring dispo    Safety  Safety and communication plan established to target dynamic risk factors discussed above.    Discharge Plan   Group home vs aunt    Interval Progress   Discussed voices and threats, minimal insight into triggers, recalled an episode after lunch but before dinner yesterday hwere he was hearing the voices, currently hearing mumbling. States that he might hear them more often when he feels stressed, sometimes the voices sound like someone he used to know, but does not sound like replaying traumatic events. He decided he will write down when he hears the voices and hopes this will help give him more insight an understanding to his triggers and circumstances around the voices. Sleep and appetite is adequate.   Acceptance by patient: yes   Hopefulness in recovery: yes   Involved in reintegration process: aunt   Trusting in relationship with psychiatrist: yes  Sleep: WNL  Appetite: WNL  Compliance with Medications: compliant  Group attendance: about half of groups  Significant events: none noted      Mental Status Exam  Appearance: dressed appropriately, marginal hygiene, looks older than stated age, overweight, good eye contact  Behavior: pleasant, cooperative, calm  Speech: normal rate, normal volume, normal pitch  Mood: improved  Affect: reactive but bordering on blunted  Thought Process: organized, goal directed  Thought Content: no overt delusions  Perceptual Disturbances: no visual hallucinations, does not appear responding to internal stimuli, auditory hallucinations of mumbling currently but otherwise  is hearing frequent threats.  Hx Risk Factors: chronic psychiatric problems, chronic psychotic symptoms, history of psychosis  Sensorium: alert and oriented to person, place, time and situation  Cognition: recent and remote memory grossly intact  Consciousness: alert and awake  Attention: attention span and concentration are age appropriate  Intellect: appears to be of average intelligence  Insight: improving  Judgement: intact and improving  Motor Activity: no abnormal movements     Vitals  Temp:  [97.5 °F (36.4 °C)-98.1 °F (36.7 °C)] 97.5 °F (36.4 °C)  HR:  [] 102  Resp:  [18] 18  BP: (117-136)/(65-79) 133/79  SpO2:  [96 %-99 %] 99 %  No intake or output data in the 24 hours ending 09/04/24 1800    Lab Results: All Labs For Current Hospital Admission Reviewed  Results from last 7 days   Lab Units 08/29/24 2022   WBC Thousand/uL 11.55*   RBC Million/uL 5.32   HEMOGLOBIN g/dL 12.9   HEMATOCRIT % 42.0   MCV fL 79*   PLATELETS Thousands/uL 246   TOTAL NEUT ABS Thousands/µL 6.36   CLOZAPINE LVL ng/mL 663       Current Facility-Administered Medications   Medication Dose Route Frequency Provider Last Rate    acetaminophen  650 mg Oral Q4H PRN Jordan C Holter, DO      acetaminophen  650 mg Oral Q6H PRN HOLLI Lion      aluminum-magnesium hydroxide-simethicone  30 mL Oral Q4H PRN Jordan C Holter, DO      amLODIPine  5 mg Oral Daily HOLLI Lion      ARIPiprazole  15 mg Oral Daily Bora Rosario MD      Artificial Tears  1 drop Both Eyes Q3H PRN Jordan C Holter, DO      atropine  1 drop Sublingual Daily PRN HOLLI Lion      haloperidol lactate  2.5 mg Intramuscular Q4H PRN Max 4/day HOLLI Lion      And    LORazepam  1 mg Intramuscular Q4H PRN Max 4/day HOLLI Lion      And    benztropine  0.5 mg Intramuscular Q4H PRN Max 4/day HOLLI Lion      benztropine  1 mg Intramuscular Q4H PRN Max 6/day Jordan C Holter, DO      haloperidol lactate  5 mg Intramuscular Q4H PRN Max 4/day  HOLLI Lion      And    LORazepam  2 mg Intramuscular Q4H PRN Max 4/day HOLLI Lion      And    benztropine  1 mg Intramuscular Q4H PRN Max 4/day HOLLI Lion      benztropine  1 mg Oral Q4H PRN Max 6/day HOLLI Lion      benztropine  1 mg Oral Q4H PRN Max 6/day Ion Dupontter, DO      bisacodyl  10 mg Rectal Daily PRN HOLLI Lion      calcium carbonate  500 mg Oral BID PRN HOLLI Monge      cloZAPine  200 mg Oral Before Dinner Bora Rosario MD      cloZAPine  300 mg Oral HS Bora Rosario MD      cyanocobalamin  1,000 mcg Oral Daily HOLLI Galvan      hydrOXYzine HCL  50 mg Oral Q6H PRN Max 4/day Jordan C Holter, DO      Or    diphenhydrAMINE  50 mg Intramuscular Q6H PRN Jordan C Holter, DO      hydrOXYzine HCL  50 mg Oral Q6H PRN Max 4/day HOLLI Lion      Or    diphenhydrAMINE  50 mg Intramuscular Q6H PRN HOLLI Lion      diphenhydrAMINE-zinc acetate   Topical BID PRN HOLLI Lion      escitalopram  20 mg Oral Daily HOLLI Lion      famotidine  20 mg Oral BID HOLLI Monge      fluticasone  1 spray Each Nare Daily Brina Guillen MD      glycopyrrolate  1 mg Oral BID (AM & Afternoon) Bora Rosario MD      glycopyrrolate  2 mg Oral HS Bora Rosario MD      haloperidol  1 mg Oral Q6H PRN HOLLI Lion      haloperidol  2.5 mg Oral Q4H PRN Max 4/day HOLLI Lion      haloperidol  5 mg Oral Q4H PRN Max 4/day HOLLI Lion      hydroCHLOROthiazide  12.5 mg Oral Daily HOLLI Galvan      hydrocortisone   Topical 4x Daily PRN HOLLI Lion      hydrOXYzine HCL  100 mg Oral Q6H PRN Max 4/day Ion Dupontter, DO      Or    LORazepam  2 mg Intramuscular Q6H PRN Ion Dupontter, DO      hydrOXYzine HCL  100 mg Oral Q6H PRN Max 4/day HOLLI Lion      Or    LORazepam  2 mg Intramuscular Q6H PRN HOLLI Lion      hydrOXYzine HCL  25 mg Oral Q6H PRN Max 4/day Jordan C Holter, DO       ibuprofen  600 mg Oral Q8H PRN HOLLI Lion      lactated ringers  50 mL/hr Intravenous Continuous Antwan Dong MD      loratadine  10 mg Oral Daily Brina Guillen MD      melatonin  3 mg Oral HS PRN Bora Rosario MD      metFORMIN  500 mg Oral BID With Meals HOLLI Galvan      methocarbamol  500 mg Oral Q6H PRN HOLLI Lion      nicotine polacrilex  2 mg Oral Q4H PRN Bora Rosario MD      OLANZapine  5 mg Oral Q4H PRN Max 3/day [facility] Holter, DO      Or    OLANZapine  2.5 mg Intramuscular Q4H PRN Max 3/day [facility] Holter, DO      OLANZapine  5 mg Oral Q3H PRN Max 3/day [facility] Holter, DO      Or    OLANZapine  5 mg Intramuscular Q3H PRN Max 3/day [facility] Holter, DO      OLANZapine  2.5 mg Oral Q4H PRN Max 6/day [facility] Holter, DO      ondansetron  4 mg Oral Q6H PRN HOLLI Lion      pantoprazole  20 mg Oral QAM [facility] Holter, DO      polyethylene glycol  17 g Oral Daily PRN HOLLI Ghotra      propranolol  10 mg Oral Q12H KAYLIE HOLLI Lion      senna-docusate sodium  1 tablet Oral Daily PRN [facility] Holter, DO      senna-docusate sodium  2 tablet Oral HS Melvina Ambron, DO      traZODone  50 mg Oral HS PRN HOLLI Lion      white petrolatum-mineral oil   Topical TID PRN HOLLI Lion         Counseling / Coordination of Care: Total floor / unit time spent today 15 minutes. Greater than 50% of total time was spent with the patient and / or family counseling and / or somewhat receptive to supportive listening and teaching positive coping skills to deal with symptom mangement.     Patient's Rights, confidentiality and exceptions to confidentiality, use of automated medical record, Behavioral Health Services staff access to medical record, and consent to treatment reviewed.    This note has been dictated and hence there may be problems with punctuation, spelling and formatting and if anyone has any concerns please address them to the writer.   This note  is not shared with patient due to potential for making patient's condition worse by knowing the content of the note.    Harjeet Torres, DO

## 2024-09-05 LAB
BASOPHILS # BLD AUTO: 0.04 THOUSANDS/ÂΜL (ref 0–0.1)
BASOPHILS NFR BLD AUTO: 0 % (ref 0–1)
BNP SERPL-MCNC: 21 PG/ML (ref 0–100)
CK SERPL-CCNC: 259 U/L (ref 39–308)
CLOZAPINE SERPL-MCNC: 820 NG/ML (ref 350–900)
CRP SERPL QL: 11.4 MG/L
EOSINOPHIL # BLD AUTO: 1.2 THOUSAND/ÂΜL (ref 0–0.61)
EOSINOPHIL NFR BLD AUTO: 10 % (ref 0–6)
ERYTHROCYTE [DISTWIDTH] IN BLOOD BY AUTOMATED COUNT: 14.4 % (ref 11.6–15.1)
HCT VFR BLD AUTO: 46.5 % (ref 36.5–49.3)
HGB BLD-MCNC: 13.8 G/DL (ref 12–17)
IMM GRANULOCYTES # BLD AUTO: 0.04 THOUSAND/UL (ref 0–0.2)
IMM GRANULOCYTES NFR BLD AUTO: 0 % (ref 0–2)
LYMPHOCYTES # BLD AUTO: 3.58 THOUSANDS/ÂΜL (ref 0.6–4.47)
LYMPHOCYTES NFR BLD AUTO: 30 % (ref 14–44)
MCH RBC QN AUTO: 23.3 PG (ref 26.8–34.3)
MCHC RBC AUTO-ENTMCNC: 29.7 G/DL (ref 31.4–37.4)
MCV RBC AUTO: 79 FL (ref 82–98)
MONOCYTES # BLD AUTO: 0.81 THOUSAND/ÂΜL (ref 0.17–1.22)
MONOCYTES NFR BLD AUTO: 7 % (ref 4–12)
NEUTROPHILS # BLD AUTO: 6.33 THOUSANDS/ÂΜL (ref 1.85–7.62)
NEUTS SEG NFR BLD AUTO: 53 % (ref 43–75)
NRBC BLD AUTO-RTO: 0 /100 WBCS
PLATELET # BLD AUTO: 270 THOUSANDS/UL (ref 149–390)
PMV BLD AUTO: 11.3 FL (ref 8.9–12.7)
RBC # BLD AUTO: 5.92 MILLION/UL (ref 3.88–5.62)
WBC # BLD AUTO: 12 THOUSAND/UL (ref 4.31–10.16)

## 2024-09-05 PROCEDURE — 80159 DRUG ASSAY CLOZAPINE: CPT | Performed by: PSYCHIATRY & NEUROLOGY

## 2024-09-05 PROCEDURE — 85025 COMPLETE CBC W/AUTO DIFF WBC: CPT

## 2024-09-05 PROCEDURE — 86140 C-REACTIVE PROTEIN: CPT

## 2024-09-05 PROCEDURE — 82550 ASSAY OF CK (CPK): CPT

## 2024-09-05 PROCEDURE — 99232 SBSQ HOSP IP/OBS MODERATE 35: CPT | Performed by: PSYCHIATRY & NEUROLOGY

## 2024-09-05 PROCEDURE — 83880 ASSAY OF NATRIURETIC PEPTIDE: CPT

## 2024-09-05 RX ADMIN — NICOTINE POLACRILEX 2 MG: 2 GUM, CHEWING ORAL at 12:56

## 2024-09-05 RX ADMIN — NICOTINE POLACRILEX 2 MG: 2 GUM, CHEWING ORAL at 22:00

## 2024-09-05 RX ADMIN — NICOTINE POLACRILEX 2 MG: 2 GUM, CHEWING ORAL at 08:38

## 2024-09-05 RX ADMIN — GLYCOPYRROLATE 2 MG: 1 TABLET ORAL at 21:59

## 2024-09-05 RX ADMIN — FLUTICASONE PROPIONATE 1 SPRAY: 50 SPRAY, METERED NASAL at 08:38

## 2024-09-05 RX ADMIN — CLOZAPINE 300 MG: 200 TABLET ORAL at 22:00

## 2024-09-05 RX ADMIN — PANTOPRAZOLE SODIUM 20 MG: 20 TABLET, DELAYED RELEASE ORAL at 08:38

## 2024-09-05 RX ADMIN — ESCITALOPRAM OXALATE 20 MG: 10 TABLET, FILM COATED ORAL at 08:37

## 2024-09-05 RX ADMIN — ARIPIPRAZOLE 15 MG: 15 TABLET ORAL at 08:38

## 2024-09-05 RX ADMIN — GLYCOPYRROLATE 1 MG: 1 TABLET ORAL at 13:00

## 2024-09-05 RX ADMIN — METFORMIN HYDROCHLORIDE 500 MG: 500 TABLET, FILM COATED ORAL at 08:37

## 2024-09-05 RX ADMIN — AMLODIPINE BESYLATE 5 MG: 5 TABLET ORAL at 08:38

## 2024-09-05 RX ADMIN — METFORMIN HYDROCHLORIDE 500 MG: 500 TABLET, FILM COATED ORAL at 17:00

## 2024-09-05 RX ADMIN — HYDROCHLOROTHIAZIDE 12.5 MG: 12.5 TABLET ORAL at 08:37

## 2024-09-05 RX ADMIN — PROPRANOLOL HYDROCHLORIDE 10 MG: 10 TABLET ORAL at 21:59

## 2024-09-05 RX ADMIN — SENNOSIDES AND DOCUSATE SODIUM 2 TABLET: 8.6; 5 TABLET ORAL at 21:59

## 2024-09-05 RX ADMIN — LORATADINE 10 MG: 10 TABLET ORAL at 08:38

## 2024-09-05 RX ADMIN — CYANOCOBALAMIN TAB 1000 MCG 1000 MCG: 1000 TAB at 08:37

## 2024-09-05 RX ADMIN — FAMOTIDINE 20 MG: 20 TABLET ORAL at 17:15

## 2024-09-05 RX ADMIN — PROPRANOLOL HYDROCHLORIDE 10 MG: 10 TABLET ORAL at 08:38

## 2024-09-05 RX ADMIN — NICOTINE POLACRILEX 2 MG: 2 GUM, CHEWING ORAL at 17:00

## 2024-09-05 RX ADMIN — FAMOTIDINE 20 MG: 20 TABLET ORAL at 08:38

## 2024-09-05 RX ADMIN — CLOZAPINE 200 MG: 200 TABLET ORAL at 17:00

## 2024-09-05 RX ADMIN — GLYCOPYRROLATE 1 MG: 1 TABLET ORAL at 08:37

## 2024-09-05 NOTE — PROGRESS NOTES
Psychiatry Progress Note [facility]    Alberto Berumen 28 y.o. male MRN: [id number]  Unit/Bed#: [facility] 108-02 Encounter: [id number]  Code Status: Level 1 - Full Code    PCP: Joce Juan MD    Date of Admission:  3/29/2024 2008   Date of Service:  09/05/24    Patient Active Problem List   Diagnosis    GERD (gastroesophageal reflux disease)    Medical clearance for psychiatric admission    Schizoaffective disorder, bipolar type ([facility])    Tobacco abuse    T wave inversion in EKG    Syringoma    Chronic idiopathic constipation    Vitamin B 12 deficiency    Vitamin D deficiency    Confluent and reticulate papillomatosis    Class 2 obesity in adult    Primary hypertension    Elevated hemoglobin A1c    Bilateral lower extremity edema         Review of systems: other systems unremarkable  Psychiatric Diagnosis: schizoaffective disorder, bipolar type     Assessment  Overall Status: overall mildly improved distress over psychotic symptoms, continued depressive symptoms, receiving last ECT treatments upcoming. Continues to hear threatening voices.   Certification Statement: The patient will continue to require additional inpatient hospital stay due to due to continued psychotic and severe neurovegetative symptoms of depression (anhedonia, memory - which can be a factor of ECT but unlikely)        Medications:   Continue abilify 15mg po daily  Continue clozaril 200mg po at dinner and 300mg po HS  Continine B12 1000mcg po daily  Continue lexapro 20mg po daily  Continue glycopyrrolate 1mg BID po and 2mg HS  Continue propanolol 10mg po BID  Continue senna docusate sodium 2 tablet HS    Side effects from treatment: continue drooling, encouraged to ask for drops again   Medication changes   none   Medication education   Risks side effects benefits and precautions of medications discussed with patient and he did verbalize an understanding about risks for metabolic syndrome from being on neuroleptics and is form  "tardive dyskinesia etc.  All medications reviewed and I recommend that they be continued for symptom management   Understanding of medications: verbalizes    Justification for dual anti-psychotics: inability to control psychotic symptoms      Non-pharmacological treatments  Continue with individual, group, milieu and occupational therapy using recovery principles and psycho-education about accepting illness and the need for treatment.  Continue working on dipo  Last session of ECT on 9/13    Safety  Safety and communication plan established to target dynamic risk factors discussed above.    Discharge Plan   Group home vs aunt    Interval Progress   Patient stated he slept well but slept in an missed breakfast so was disappointed about that otherwise he states he is feeling ok, spoke with his aunt who he did not \"get a chance to speak with her about\" living with her but it appears that \"my other aunts moved in with her\" and he is unsure about the group home since he doesn't feel that there was anything challenging or difficult living with family and he prefers that. Denies acute issues of SI, HI, still stating he is hearing those threatening voices frequently, but did not write down details of those episodes from yesterday.   Acceptance by patient: yes   Hopefulness in recovery: hoping to live with aunt   Involved in reintegration process: aunt   Trusting in relationship with psychiatrist: yes  Sleep: adequate  Appetite: adequate  Compliance with Medications: good  Group attendance: less than 50%  Significant events: none noted      Mental Status Exam  Appearance: dressed appropriately, marginal hygiene, looks older than stated age, overweight, good eye contact, distant gaze  Behavior: cooperative, calm  Speech: normal rate, normal volume, normal pitch  Mood: dysphoric  Affect: blunted  Thought Process: organized, goal directed, impaired abstract reasoning  Thought Content: no overt delusions  Perceptual " Disturbances: no visual hallucinations, auditory hallucinations, appears preoccupied  Hx Risk Factors: chronic psychiatric problems, chronic depressive symptoms, chronic psychotic symptoms  Sensorium: alert and oriented to person, place, time and situation  Cognition: recent and remote memory grossly intact  Consciousness: alert and awake  Attention: attention span and concentration are age appropriate  Intellect: appears to be of average intelligence  Insight: limited  Judgement: limited  Motor Activity: no abnormal movements     Vitals  Temp:  [96.8 °F (36 °C)-98 °F (36.7 °C)] 96.8 °F (36 °C)  HR:  [] 91  Resp:  [16-18] 16  BP: (121-139)/(69-94) 121/69  SpO2:  [99 %-100 %] 100 %  No intake or output data in the 24 hours ending 09/05/24 1046    Lab Results: All Labs For Current Hospital Admission Reviewed  Results from last 7 days   Lab Units 08/29/24 2022   WBC Thousand/uL 11.55*   RBC Million/uL 5.32   HEMOGLOBIN g/dL 12.9   HEMATOCRIT % 42.0   MCV fL 79*   PLATELETS Thousands/uL 246   TOTAL NEUT ABS Thousands/µL 6.36   CLOZAPINE LVL ng/mL 663       Current Facility-Administered Medications   Medication Dose Route Frequency Provider Last Rate    acetaminophen  650 mg Oral Q4H PRN Jordan C Holter, DO      acetaminophen  650 mg Oral Q6H PRN HOLLI Lion      aluminum-magnesium hydroxide-simethicone  30 mL Oral Q4H PRN Jordan C Holter, DO      amLODIPine  5 mg Oral Daily HOLLI Lion      ARIPiprazole  15 mg Oral Daily Bora Rosario MD      Artificial Tears  1 drop Both Eyes Q3H PRN Jordan C Holter, DO      atropine  1 drop Sublingual Daily PRN HOLLI Lion      haloperidol lactate  2.5 mg Intramuscular Q4H PRN Max 4/day HOLLI Lion      And    LORazepam  1 mg Intramuscular Q4H PRN Max 4/day HOLLI Lion      And    benztropine  0.5 mg Intramuscular Q4H PRN Max 4/day HOLLI Lion      benztropine  1 mg Intramuscular Q4H PRN Max 6/day Jordan C Holter, DO      haloperidol  lactate  5 mg Intramuscular Q4H PRN Max 4/day HOLLI Lion      And    LORazepam  2 mg Intramuscular Q4H PRN Max 4/day HOLLI Lion      And    benztropine  1 mg Intramuscular Q4H PRN Max 4/day HOLLI Lion      benztropine  1 mg Oral Q4H PRN Max 6/day Eveline Hunt CRJENNIE      benztropine  1 mg Oral Q4H PRN Max 6/day Ion Dupontter, DO      bisacodyl  10 mg Rectal Daily PRN HOLLI Lion      calcium carbonate  500 mg Oral BID PRN HOLLI Monge      cloZAPine  200 mg Oral Before Dinner Bora Rosario MD      cloZAPine  300 mg Oral HS Bora Rosario MD      cyanocobalamin  1,000 mcg Oral Daily Pia Mantilla, HOLLI      hydrOXYzine HCL  50 mg Oral Q6H PRN Max 4/day Jordan C Holter, DO      Or    diphenhydrAMINE  50 mg Intramuscular Q6H PRN Jordan C Holter, DO      hydrOXYzine HCL  50 mg Oral Q6H PRN Max 4/day HOLLI Lion      Or    diphenhydrAMINE  50 mg Intramuscular Q6H PRN HOLLI Lion      diphenhydrAMINE-zinc acetate   Topical BID PRN HOLLI Lion      escitalopram  20 mg Oral Daily HOLLI Lion      famotidine  20 mg Oral BID EileenHOLLI Cummins      fluticasone  1 spray Each Nare Daily Brina Guillen MD      glycopyrrolate  1 mg Oral BID (AM & Afternoon) Bora Rosario MD      glycopyrrolate  2 mg Oral HS Bora Rosario MD      haloperidol  1 mg Oral Q6H PRN HOLLI Lion      haloperidol  2.5 mg Oral Q4H PRN Max 4/day HOLLI Lion      haloperidol  5 mg Oral Q4H PRN Max 4/day HOLLI Lion      hydroCHLOROthiazide  12.5 mg Oral Daily Pia Mantilla, HOLLI      hydrocortisone   Topical 4x Daily PRN Eveline Hunt, HOLLI      hydrOXYzine HCL  100 mg Oral Q6H PRN Max 4/day Ion Dupontter, DO      Or    LORazepam  2 mg Intramuscular Q6H PRN Ion Dupontter, DO      hydrOXYzine HCL  100 mg Oral Q6H PRN Max 4/day HOLLI Lion      Or    LORazepam  2 mg Intramuscular Q6H PRN HOLLI Lion      hydrOXYzine HCL  25 mg  Oral Q6H PRN Max 4/day [facility] Holter, DO      ibuprofen  600 mg Oral Q8H PRN HOLLI Lion      lactated ringers  50 mL/hr Intravenous Continuous Antwan Dong MD      loratadine  10 mg Oral Daily Brina Guillen MD      melatonin  3 mg Oral HS PRN Bora Rosario MD      metFORMIN  500 mg Oral BID With Meals HOLLI Galvan      methocarbamol  500 mg Oral Q6H PRN HOLLI Lion      nicotine polacrilex  2 mg Oral Q4H PRN Bora Rosario MD      OLANZapine  5 mg Oral Q4H PRN Max 3/day [facility] Holter, DO      Or    OLANZapine  2.5 mg Intramuscular Q4H PRN Max 3/day [facility] Holter, DO      OLANZapine  5 mg Oral Q3H PRN Max 3/day [facility] Holter, DO      Or    OLANZapine  5 mg Intramuscular Q3H PRN Max 3/day [facility] Holter, DO      OLANZapine  2.5 mg Oral Q4H PRN Max 6/day [facility] Holter, DO      ondansetron  4 mg Oral Q6H PRN HOLLI Lion      pantoprazole  20 mg Oral QAM [facility] Holter, DO      polyethylene glycol  17 g Oral Daily PRN HOLLI Ghotra      propranolol  10 mg Oral Q12H KAYLIE HOLLI Lion      senna-docusate sodium  1 tablet Oral Daily PRN [facility] Holter, DO      senna-docusate sodium  2 tablet Oral HS Melvina Ambron, DO      traZODone  50 mg Oral HS PRN HOLLI Lion      white petrolatum-mineral oil   Topical TID PRN HOLLI Lion         Counseling / Coordination of Care: Total floor / unit time spent today 15 minutes. Greater than 50% of total time was spent with the patient and / or family counseling and / or somewhat receptive to supportive listening and teaching positive coping skills to deal with symptom mangement.     Patient's Rights, confidentiality and exceptions to confidentiality, use of automated medical record, Behavioral Health Services staff access to medical record, and consent to treatment reviewed.    This note has been dictated and hence there may be problems with punctuation, spelling and formatting and if anyone has any concerns  please address them to the writer.   This note is not shared with patient due to potential for making patient's condition worse by knowing the content of the note.    Harjeet Torres, DO

## 2024-09-05 NOTE — PLAN OF CARE
Problem: Alteration in Thoughts and Perception  Goal: Verbalize thoughts and feelings  Description: Interventions:  - Promote a nonjudgmental and trusting relationship with the patient through active listening and therapeutic communication  - Assess patient's level of functioning, behavior and potential for risk  - Engage patient in 1 on 1 interactions  - Encourage patient to express fears, feelings, frustrations, and discuss symptoms    - [place] patient to reality, help patient recognize reality-based thinking   - Administer medications as ordered and assess for potential side effects  - Provide the patient education related to the signs and symptoms of the illness and desired effects of prescribed medications  Outcome: Progressing  Goal: Refrain from acting on delusional thinking/internal stimuli  Description: Interventions:  - Monitor patient closely, per order   - Utilize least restrictive measures   - Set reasonable limits, give positive feedback for acceptable   - Administer medications as ordered and monitor of potential side effects  Outcome: Progressing  Goal: Agree to be compliant with medication regime, as prescribed and report medication side effects  Description: Interventions:  - Offer appropriate PRN medication and supervise ingestion; conduct AIMS, as needed   Outcome: Progressing  Goal: Attend and participate in unit activities, including therapeutic, recreational, and educational groups  Description: Interventions:  -Encourage Visitation and family involvement in care  Outcome: Progressing     Problem: Ineffective Coping  Goal: Identifies healthy coping skills  Outcome: Progressing  Goal: Participates in unit activities  Description: Interventions:  - Provide therapeutic environment   - Provide required programming   - Redirect inappropriate behaviors   Outcome: Progressing     Problem: Depression  Goal: Refrain from isolation  Description: Interventions:  - Develop a trusting relationship   -  Encourage socialization   Outcome: Progressing  Goal: Refrain from self-neglect  Outcome: Progressing  Goal: Attend and participate in unit activities, including therapeutic, recreational, and educational groups  Description: Interventions:  - Provide therapeutic and educational activities daily, encourage attendance and participation, and document same in the medical record   Outcome: Progressing     Problem: Anxiety  Goal: Anxiety is at manageable level  Description: Interventions:  - Assess and monitor patient's anxiety level.   - Monitor for signs and symptoms (heart palpitations, chest pain, shortness of breath, headaches, nausea, feeling jumpy, restlessness, irritable, apprehensive).   - Collaborate with interdisciplinary team and initiate plan and interventions as ordered.  - Felt patient to unit/surroundings  - Explain treatment plan  - Encourage participation in care  - Encourage verbalization of concerns/fears  - Identify coping mechanisms  - Assist in developing anxiety-reducing skills  - Administer/offer alternative therapies  - Limit or eliminate stimulants  Outcome: Progressing     Problem: Alteration in Orientation  Goal: Interact with staff daily  Description: Interventions:  - Assess and re-assess patient's level of orientation  - Engage patient in 1 on 1 interactions, daily, for a minimum of 15 minutes   - Establish rapport/trust with patient   Outcome: Progressing

## 2024-09-05 NOTE — NURSING NOTE
Received pt in bed at change of shift. Observed eyes closed, and chest movement noted. Continuous safety checks maintained. No unmet needs at this time. Will continue to monitor patient needs, sleep pattern, and behaviors.     0615: Patient has slept all night, 7+ hours of uninterrupted sleep thus far.

## 2024-09-05 NOTE — SOCIAL WORK
BRANDY checked in with pt. Pt reports his aunt is requesting an in person visit this weekend. SW encouraged pt to solidify details with her. Reviewed current functioning and processed thoughts related to potential CRR discharge plan.    BRANDY then placed call to aunt, left voicemail explaining any weekend visit needs to be approved by tx team tomorrow and requested a call back with details.   Aunt requesting in person visit with 2 of pt's other aunts with arrival Sunday between 3-4 pm for 30 minute visit.   Virtual visit rescheduled with aunt for tonight at 6:30 due to aunt not receiving previous link. SW attempted call to confirm link was received, left vm.

## 2024-09-05 NOTE — NURSING NOTE
Alberto is alert, awake, and visible on the milieu and social with select peers. Pt is pleasant, polite, and cooperative. Reports the same AH of people going to kill him and his family and denies all other psychiatric symptoms. Took HS medications without incidence. Pt offered no concerns. No behavioral issues.

## 2024-09-05 NOTE — PROGRESS NOTES
09/05/24 0748   Team Meeting   Meeting Type Daily Rounds   Team Members Present   Team Members Present Physician;Nurse;;Other (Discipline and Name)   Patient/Family Present   Patient Present No   Patient's Family Present No     In attendance:  MD Guy Gamino, RN  Luisana Alvarado, CHRISW  Irais Mcdowell M.S.    Groups: 8/10    Pt reports ongoing AH that is unchanged. Pt has last ECT 9/13. No bx issues noted. Pt still unsure if he wants to go home or to CRR but family remains unwilling to take pt home at this time.

## 2024-09-06 PROCEDURE — 99232 SBSQ HOSP IP/OBS MODERATE 35: CPT | Performed by: PSYCHIATRY & NEUROLOGY

## 2024-09-06 RX ADMIN — SENNOSIDES AND DOCUSATE SODIUM 2 TABLET: 8.6; 5 TABLET ORAL at 21:12

## 2024-09-06 RX ADMIN — GLYCOPYRROLATE 1 MG: 1 TABLET ORAL at 13:00

## 2024-09-06 RX ADMIN — GLYCOPYRROLATE 1 MG: 1 TABLET ORAL at 08:44

## 2024-09-06 RX ADMIN — CYANOCOBALAMIN TAB 1000 MCG 1000 MCG: 1000 TAB at 08:45

## 2024-09-06 RX ADMIN — METFORMIN HYDROCHLORIDE 500 MG: 500 TABLET, FILM COATED ORAL at 08:52

## 2024-09-06 RX ADMIN — FAMOTIDINE 20 MG: 20 TABLET ORAL at 17:20

## 2024-09-06 RX ADMIN — PROPRANOLOL HYDROCHLORIDE 10 MG: 10 TABLET ORAL at 21:13

## 2024-09-06 RX ADMIN — METFORMIN HYDROCHLORIDE 500 MG: 500 TABLET, FILM COATED ORAL at 17:20

## 2024-09-06 RX ADMIN — NICOTINE POLACRILEX 2 MG: 2 GUM, CHEWING ORAL at 12:57

## 2024-09-06 RX ADMIN — NICOTINE POLACRILEX 2 MG: 2 GUM, CHEWING ORAL at 08:45

## 2024-09-06 RX ADMIN — NICOTINE POLACRILEX 2 MG: 2 GUM, CHEWING ORAL at 17:20

## 2024-09-06 RX ADMIN — ESCITALOPRAM OXALATE 20 MG: 10 TABLET, FILM COATED ORAL at 08:44

## 2024-09-06 RX ADMIN — CLOZAPINE 200 MG: 200 TABLET ORAL at 17:20

## 2024-09-06 RX ADMIN — PROPRANOLOL HYDROCHLORIDE 10 MG: 10 TABLET ORAL at 08:44

## 2024-09-06 RX ADMIN — FAMOTIDINE 20 MG: 20 TABLET ORAL at 08:45

## 2024-09-06 RX ADMIN — CLOZAPINE 300 MG: 200 TABLET ORAL at 21:13

## 2024-09-06 RX ADMIN — GLYCOPYRROLATE 2 MG: 1 TABLET ORAL at 21:12

## 2024-09-06 RX ADMIN — ARIPIPRAZOLE 15 MG: 15 TABLET ORAL at 08:45

## 2024-09-06 RX ADMIN — NICOTINE POLACRILEX 2 MG: 2 GUM, CHEWING ORAL at 21:13

## 2024-09-06 RX ADMIN — LORATADINE 10 MG: 10 TABLET ORAL at 08:45

## 2024-09-06 RX ADMIN — HYDROCHLOROTHIAZIDE 12.5 MG: 12.5 TABLET ORAL at 08:45

## 2024-09-06 RX ADMIN — FLUTICASONE PROPIONATE 1 SPRAY: 50 SPRAY, METERED NASAL at 08:44

## 2024-09-06 RX ADMIN — PANTOPRAZOLE SODIUM 20 MG: 20 TABLET, DELAYED RELEASE ORAL at 08:44

## 2024-09-06 NOTE — PROGRESS NOTES
Psychiatry Progress Note [facility]    Alberto Berumen 28 y.o. male MRN: [id number]  Unit/Bed#: [facility] 108-02 Encounter: [id number]  Code Status: Level 1 - Full Code    PCP: Joce Juan MD    Date of Admission:  3/29/2024 2008   Date of Service:  09/06/24    Patient Active Problem List   Diagnosis    GERD (gastroesophageal reflux disease)    Medical clearance for psychiatric admission    Schizoaffective disorder, bipolar type (HCC)    Tobacco abuse    T wave inversion in EKG    Syringoma    Chronic idiopathic constipation    Vitamin B 12 deficiency    Vitamin D deficiency    Confluent and reticulate papillomatosis    Class 2 obesity in adult    Primary hypertension    Elevated hemoglobin A1c    Bilateral lower extremity edema     Review of systems: Unremarkable   assessment  Overall Status: Continues to have same voices threatening to kill him but feels ECTs have helped and anticipating to get 1 more ECTs scheduled next week.  Now hoping to have interpersonal visit with aunt when he will ask if he could live with her again and still questioning if he should move into the group home instead.  Not aggressive or agitated or compliant with medications staying to himself   certification Statement: The patient will continue to require additional inpatient hospital stay due to ongoing voices that are threatening to mixing lack of response to medications and ECT so far.     Medications: Clozapine 300 mg at bedtime and 200 mg at 4 PM, propranolol 10 mg every 12 hours as as needed for anxiety, Abilify 15 mg mg at bedtime Lexapro 20 mg once a day, atropine eyedrops sublingual for drooling of saliva and senna 2 tablets twice a day for constipation  All medications reviewed with patient and recommend they be continued for symptom management   side effects from treatment: None reported other than drooling at night  Medication changes   Increase Robinul at HS  Medication education   Risks side effects  benefits and precautions of medications discussed with patient and he did verbalize an understanding about risks for metabolic syndrome from being on neuroleptics and is form tardive dyskinesia etc. and special precautions about being on clozapine   Understanding of medications: Has some understanding   Justification for dual anti-psychotics: Not applicable    Non-pharmacological treatments  Continue with individual, group, milieu and occupational therapy using recovery principles and psycho-education about accepting illness and the need for treatment.   to contact aunt and explore ACT team referral which he never had  ECT to be continued  q 2 weekly for maintenance x 6  Refuses to see a dietician and agrees to do more exercises as he is about 100 lbs overweight   Prolactin level high so Risperdal replaced with Abilify which he has tolerated well so far with prolactin level coming back to normal    Safety  Safety and communication plan established to target dynamic risk factors discussed above.    Discharge Plan   Back to his aunt with an ACT team once ECT is completed but the aunt is now having second thoughts so he may need a referral to a CRR    Interval Progress   Still questioning the need to go into a group home and is hoping to convince his aunt to take him back who is going to visit with him in person this week along with 2 of his other aunts.  He still hears voices threatening to kill him and his family which he has been hearing for over 2 years.  Claims to feel sad with passive death wishes over the voices but able to contract for safety.  Voices are never commanding but only threatening.  No incidents of crossing boundaries with her identified female peer.  His behavior is under control with no need for PRNs and no need for security to be called on his behalf and attending some of the groups  acceptance by patient: Continues to hope to live with his aunt again but accepting need for  hospitalization  Hopefulness in recovery: Hoping his aunt will take him back  Involved in reintegration process: Talking and meeting with aunt and his siblings from [place]  trusting in relationship with psychiatrist: Trusting  Sleep: Good  Appetite: Good  Compliance with Medications: Good  Group attendance: Most of the groups   Significant events and progress towards goals: Status quo    Mental Status Exam  Appearance: age appropriate, improved grooming, looks older than stated age, overweight, with hair in dreadlocks casually dressed with a clean shaven face, fairly groomed with good eye contact up this morning walking around the unit casually dressed in hospital clothing as usual having eating his breakfast and looking forward to inpatient visit with his 2 aunts this weekend on the unit   behavior: Friendly cooperative better groomed  speech: normal rate, normal volume, normal pitch  Mood: dysphoric, anxious, continues to report feeling good with the ECT   affect: constricted, inappropriate, mood-congruent.  Somewhat constricted with minimal reactivity thought Process: organized, logical, coherent, goal directed, linear, decreased rate of thoughts, slowing of thoughts, negative thinking, impaired abstract reasoning, concrete  Thought Content: paranoid ideation, some paranoia, grandiose ideas, intrusive thoughts, preoccupied, chronic, continues to report paranoia about people threatening to kill him and his family because of the voices.  He believes ECT has helped so that he is not much in his head.  No other delusions elicited.  No current suicidal or homicidal thoughts and no plans verbalized but today reports having passive death wishes because of voices with no plans and able to CFS and it has not changed yet  .  No phobias obsessions compulsions or distorted body perceptions reported.  Preoccupied with wanting to get ECTs more often despite reminding him that it may impair his memory and finally he agreed  to such as it is  every 2 weeks  Perceptual Disturbances: Still reports same voices that threaten to kill him and his family and he believes they are real and does not believe it is part of his illness  Hx Risk Factors: chronic psychiatric problems, chronic anxiety symptoms, chronic psychotic symptoms,    Sensorium: Oriented x 3 spheres and situation   cognition: recent and remote memory grossly intact  Consciousness: alert and awake  Attention: Attention and concentration good  Intellect: appears to be of average intelligence  Insight: intact  Judgement: intact  Motor Activity: no abnormal movements     Vitals  Temp:  [96.8 °F (36 °C)-97.9 °F (36.6 °C)] 97.9 °F (36.6 °C)  HR:  [] 99  Resp:  [16-18] 18  BP: (121-124)/(69-89) 121/89  SpO2:  [100 %] 100 %    Intake/Output Summary (Last 24 hours) at 9/6/2024 0458  Last data filed at 9/5/2024 1835  Gross per 24 hour   Intake 0 ml   Output --   Net 0 ml       Lab Results: All labs reviewed and prolactin level came down to normal on 8/1/2024, clozapine level 820 on 9/5/24  Current Facility-Administered Medications   Medication Dose Route Frequency Provider Last Rate    acetaminophen  650 mg Oral Q4H PRN Jordan C Holter, DO      acetaminophen  650 mg Oral Q6H PRN HOLLI Lion      aluminum-magnesium hydroxide-simethicone  30 mL Oral Q4H PRN Jordan C Holter, DO      amLODIPine  5 mg Oral Daily HOLLI Lion      ARIPiprazole  15 mg Oral Daily Bora Rosario MD      Artificial Tears  1 drop Both Eyes Q3H PRN Jordan C Holter, DO      atropine  1 drop Sublingual Daily PRN HOLLI Lion      haloperidol lactate  2.5 mg Intramuscular Q4H PRN Max 4/day HOLLI Lion      And    LORazepam  1 mg Intramuscular Q4H PRN Max 4/day HOLLI Lion      And    benztropine  0.5 mg Intramuscular Q4H PRN Max 4/day HOLLI Lion      benztropine  1 mg Intramuscular Q4H PRN Max 6/day Jordan C Holter, DO      haloperidol lactate  5 mg Intramuscular Q4H PRN  Max 4/day HOLLI Lion      And    LORazepam  2 mg Intramuscular Q4H PRN Max 4/day HOLLI Lion      And    benztropine  1 mg Intramuscular Q4H PRN Max 4/day HOLLI Lion      benztropine  1 mg Oral Q4H PRN Max 6/day HOLLI Lion      benztropine  1 mg Oral Q4H PRN Max 6/day Ion Dupontter, DO      bisacodyl  10 mg Rectal Daily PRN HOLLI Lion      calcium carbonate  500 mg Oral BID PRN HOLLI Monge      cloZAPine  200 mg Oral Before Dinner Bora Rosario MD      cloZAPine  300 mg Oral HS Bora Rosario MD      cyanocobalamin  1,000 mcg Oral Daily Pia Mantilla, HOLLI      hydrOXYzine HCL  50 mg Oral Q6H PRN Max 4/day Jordan C Holter, DO      Or    diphenhydrAMINE  50 mg Intramuscular Q6H PRN Jordan C Holter, DO      hydrOXYzine HCL  50 mg Oral Q6H PRN Max 4/day HOLLI Lion      Or    diphenhydrAMINE  50 mg Intramuscular Q6H PRN HOLLI Lion      diphenhydrAMINE-zinc acetate   Topical BID PRN HOLLI Lion      escitalopram  20 mg Oral Daily HOLLI Lion      famotidine  20 mg Oral BID HOLLI Monge      fluticasone  1 spray Each Nare Daily Brina Guillen MD      glycopyrrolate  1 mg Oral BID (AM & Afternoon) Bora Rosario MD      glycopyrrolate  2 mg Oral HS Bora Rosario MD      haloperidol  1 mg Oral Q6H PRN HOLLI Lion      haloperidol  2.5 mg Oral Q4H PRN Max 4/day HOLLI Lion      haloperidol  5 mg Oral Q4H PRN Max 4/day HOLLI Lion      hydroCHLOROthiazide  12.5 mg Oral Daily HOLLI Galvan      hydrocortisone   Topical 4x Daily PRN Eveline Hunt, HOLLI      hydrOXYzine HCL  100 mg Oral Q6H PRN Max 4/day Ion Dupontter, DO      Or    LORazepam  2 mg Intramuscular Q6H PRN Ion FISCHER Holter, DO      hydrOXYzine HCL  100 mg Oral Q6H PRN Max 4/day HOLLI Lion      Or    LORazepam  2 mg Intramuscular Q6H PRN HOLLI Lion      hydrOXYzine HCL  25 mg Oral Q6H PRN Max 4/day Ion FISCHER  Cresencioter, DO      ibuprofen  600 mg Oral Q8H PRN HOLLI Lion      lactated ringers  50 mL/hr Intravenous Continuous Antwan Dong MD      loratadine  10 mg Oral Daily Brina Guillen MD      melatonin  3 mg Oral HS PRN Bora Rosario MD      metFORMIN  500 mg Oral BID With Meals HOLLI Galvan      methocarbamol  500 mg Oral Q6H PRN HOLLI Lion      nicotine polacrilex  2 mg Oral Q4H PRN Bora Rosario MD      OLANZapine  5 mg Oral Q4H PRN Max 3/day [facility] Holter, DO      Or    OLANZapine  2.5 mg Intramuscular Q4H PRN Max 3/day [facility] Holter, DO      OLANZapine  5 mg Oral Q3H PRN Max 3/day [facility] Holter, DO      Or    OLANZapine  5 mg Intramuscular Q3H PRN Max 3/day [facility] Holter, DO      OLANZapine  2.5 mg Oral Q4H PRN Max 6/day [facility] Holter, DO      ondansetron  4 mg Oral Q6H PRN HOLLI Lion      pantoprazole  20 mg Oral QAM [facility] Holter, DO      polyethylene glycol  17 g Oral Daily PRN HOLLI Ghotra      propranolol  10 mg Oral Q12H KAYLIE HOLLI Lion      senna-docusate sodium  1 tablet Oral Daily PRN [facility] Cresencioter, DO      senna-docusate sodium  2 tablet Oral HS Melvina Ambron, DO      traZODone  50 mg Oral HS PRN HOLLI Lion      white petrolatum-mineral oil   Topical TID PRN HOLLI Lion         Counseling / Coordination of Care: Total floor / unit time spent today 15 minutes. Greater than 50% of total time was spent with the patient and / or family counseling and / or somewhat receptive to supportive listening and teaching positive coping skills to deal with symptom mangement.     Patient's Rights, confidentiality and exceptions to confidentiality, use of automated medical record, [facility] staff access to medical record, and consent to treatment reviewed.    This note has been dictated and hence there may be problems with punctuation, spelling and formatting and if anyone has any concerns please address them to Dr. Rosario    This note is not shared with patient due to potential for making patient's condition worse by knowing the content of the note.

## 2024-09-06 NOTE — PROGRESS NOTES
09/06/24 0732   Team Meeting   Meeting Type Daily Rounds   Team Members Present   Team Members Present Physician;Nurse;;Other (Discipline and Name)   Patient/Family Present   Patient Present No   Patient's Family Present No     In attendance:  Dr. Alex Thomas, MD Dr. Jordan Holter, DO Eveline Connolly, RN  Luisana Alvarado, [facility]  Carla Lux, [facility]  Irais Mcdowell, M.S.    Groups:  7/9    Pt approved for in person visit with aunts on Sunday. Pt reports ongoing AH. Pt exhibiting moderate depression. Abnormal lab results. No bx issues.

## 2024-09-06 NOTE — NURSING NOTE
Patient has been visible on the unit most of the shift ambulating in hallways by himself. Affect flat. Isolative to himself. No group attendance this shift. Encouraged to attend groups. Unkept appearance. Medication compliant. Denies suicidal ideations. Continues to admit having auditory hallucinations of voices that want to harm him and his family.

## 2024-09-06 NOTE — SOCIAL WORK
SW placed call to pt's aunt Hanh; left voicemail notifying her that tx team approved in person visit with his aunts on Sunday 9/8 with estimated arrival sometime between 3-4pm for a 30 minute visit.

## 2024-09-06 NOTE — SOCIAL WORK
SW checked in with pt. Pt reports feeling ok today. SW notified pt of approved in person visit with family Sunday.

## 2024-09-06 NOTE — NURSING NOTE
Patient calm, visible on unit, social with selective peers. Patient cooperative upon approach, behaviors controlled, offered no complications. Patient med and meal compliant and positive group attendance, will continue to monitor.

## 2024-09-06 NOTE — PLAN OF CARE
Problem: Alteration in Thoughts and Perception  Goal: Treatment Goal: Gain control of psychotic behaviors/thinking, reduce/eliminate presenting symptoms and demonstrate improved reality functioning upon discharge  Outcome: Progressing  Goal: Verbalize thoughts and feelings  Description: Interventions:  - Promote a nonjudgmental and trusting relationship with the patient through active listening and therapeutic communication  - Assess patient's level of functioning, behavior and potential for risk  - Engage patient in 1 on 1 interactions  - Encourage patient to express fears, feelings, frustrations, and discuss symptoms    - [place] patient to reality, help patient recognize reality-based thinking   - Administer medications as ordered and assess for potential side effects  - Provide the patient education related to the signs and symptoms of the illness and desired effects of prescribed medications  Outcome: Progressing  Goal: Agree to be compliant with medication regime, as prescribed and report medication side effects  Description: Interventions:  - Offer appropriate PRN medication and supervise ingestion; conduct AIMS, as needed   Outcome: Progressing  Goal: Attend and participate in unit activities, including therapeutic, recreational, and educational groups  Description: Interventions:  -Encourage Visitation and family involvement in care  Outcome: Progressing  Goal: Recognize dysfunctional thoughts, communicate reality-based thoughts at the time of discharge  Description: Interventions:  - Provide medication and psycho-education to assist patient in compliance and developing insight into his/her illness   Outcome: Progressing  Goal: Complete daily ADLs, including personal hygiene independently, as able  Description: Interventions:  - Observe, teach, and assist patient with ADLS  - Monitor and promote a balance of rest/activity, with adequate nutrition and elimination   Outcome: Progressing     Problem:  Ineffective Coping  Goal: Demonstrates healthy coping skills  Outcome: Progressing  Goal: Participates in unit activities  Description: Interventions:  - Provide therapeutic environment   - Provide required programming   - Redirect inappropriate behaviors   Outcome: Progressing  Goal: Patient/Family participate in treatment and DC plans  Description: Interventions:  - Provide therapeutic environment  Outcome: Progressing  Goal: Patient/Family verbalizes awareness of resources  Outcome: Progressing     Problem: Depression  Goal: Treatment Goal: Demonstrate behavioral control of depressive symptoms, verbalize feelings of improved mood/affect, and adopt new coping skills prior to discharge  Outcome: Progressing  Goal: Verbalize thoughts and feelings  Description: Interventions:  - Assess and re-assess patient's level of risk   - Engage patient in 1:1 interactions, daily, for a minimum of 15 minutes   - Encourage patient to express feelings, fears, frustrations, hopes   Outcome: Progressing  Goal: Refrain from harming self  Description: Interventions:  - Monitor patient closely, per order   - Supervise medication ingestion, monitor effects and side effects   Outcome: Progressing  Goal: Refrain from isolation  Description: Interventions:  - Develop a trusting relationship   - Encourage socialization   Outcome: Progressing  Goal: Refrain from self-neglect  Outcome: Progressing  Goal: Attend and participate in unit activities, including therapeutic, recreational, and educational groups  Description: Interventions:  - Provide therapeutic and educational activities daily, encourage attendance and participation, and document same in the medical record   Outcome: Progressing  Goal: Complete daily ADLs, including personal hygiene independently, as able  Description: Interventions:  - Observe, teach, and assist patient with ADLS  -  Monitor and promote a balance of rest/activity, with adequate nutrition and elimination    Outcome: Progressing     Problem: Anxiety  Goal: Anxiety is at manageable level  Description: Interventions:  - Assess and monitor patient's anxiety level.   - Monitor for signs and symptoms (heart palpitations, chest pain, shortness of breath, headaches, nausea, feeling jumpy, restlessness, irritable, apprehensive).   - Collaborate with interdisciplinary team and initiate plan and interventions as ordered.  - [place] patient to unit/surroundings  - Explain treatment plan  - Encourage participation in care  - Encourage verbalization of concerns/fears  - Identify coping mechanisms  - Assist in developing anxiety-reducing skills  - Administer/offer alternative therapies  - Limit or eliminate stimulants  Outcome: Progressing     Problem: Alteration in Orientation  Goal: Treatment Goal: Demonstrate a reduction of confusion and improved orientation to person, place, time and/or situation upon discharge, according to optimum baseline/ability  Outcome: Progressing  Goal: Interact with staff daily  Description: Interventions:  - Assess and re-assess patient's level of orientation  - Engage patient in 1 on 1 interactions, daily, for a minimum of 15 minutes   - Establish rapport/trust with patient   Outcome: Progressing  Goal: Attend and participate in unit activities, including therapeutic, recreational, and educational groups  Description: Interventions:  - Provide therapeutic and educational activities daily, encourage attendance and participation, and document same in the medical record   - Provide appropriate opportunities for reminiscence   - Provide a consistent daily routine   - Encourage family contact/visitation   Outcome: Progressing  Goal: Complete daily ADLs, including personal hygiene independently, as able  Description: Interventions:  - Observe, teach, and assist patient with ADLS  Outcome: Progressing     Problem: DISCHARGE PLANNING - CARE MANAGEMENT  Goal: Discharge to post-acute care or home with  appropriate resources  Description: INTERVENTIONS:  - Conduct assessment to determine patient/family and health care team treatment goals, and need for post-acute services based on payer coverage, community resources, and patient preferences, and barriers to discharge  - Address psychosocial, clinical, and financial barriers to discharge as identified in assessment in conjunction with the patient/family and health care team  - Arrange appropriate level of post-acute services according to patient's   needs and preference and payer coverage in collaboration with the physician and health care team  - Communicate with and update the patient/family, physician, and health care team regarding progress on the discharge plan  - Arrange appropriate transportation to post-acute venues  Outcome: Progressing

## 2024-09-06 NOTE — NURSING NOTE
Received pt awake sitting in the lounge conversing with a female peer.  Retired to his bed at 0115 and appears to be sleeping thus this far as per q 7 min safety checks.     0537:  Sleeping 3.5  hrs thus this far

## 2024-09-07 PROCEDURE — 99232 SBSQ HOSP IP/OBS MODERATE 35: CPT | Performed by: NURSE PRACTITIONER

## 2024-09-07 RX ORDER — GLYCOPYRROLATE 1 MG/1
1 TABLET ORAL ONCE
Status: COMPLETED | OUTPATIENT
Start: 2024-09-07 | End: 2024-09-07

## 2024-09-07 RX ADMIN — FAMOTIDINE 20 MG: 20 TABLET ORAL at 08:54

## 2024-09-07 RX ADMIN — ARIPIPRAZOLE 15 MG: 15 TABLET ORAL at 08:55

## 2024-09-07 RX ADMIN — GLYCOPYRROLATE 1 MG: 1 TABLET ORAL at 17:45

## 2024-09-07 RX ADMIN — NICOTINE POLACRILEX 2 MG: 2 GUM, CHEWING ORAL at 21:17

## 2024-09-07 RX ADMIN — PROPRANOLOL HYDROCHLORIDE 10 MG: 10 TABLET ORAL at 08:55

## 2024-09-07 RX ADMIN — SENNOSIDES AND DOCUSATE SODIUM 2 TABLET: 8.6; 5 TABLET ORAL at 21:17

## 2024-09-07 RX ADMIN — AMLODIPINE BESYLATE 5 MG: 5 TABLET ORAL at 08:55

## 2024-09-07 RX ADMIN — LORATADINE 10 MG: 10 TABLET ORAL at 08:54

## 2024-09-07 RX ADMIN — GLYCOPYRROLATE 1 MG: 1 TABLET ORAL at 08:54

## 2024-09-07 RX ADMIN — FAMOTIDINE 20 MG: 20 TABLET ORAL at 17:28

## 2024-09-07 RX ADMIN — METFORMIN HYDROCHLORIDE 500 MG: 500 TABLET, FILM COATED ORAL at 08:54

## 2024-09-07 RX ADMIN — HYDROCHLOROTHIAZIDE 12.5 MG: 12.5 TABLET ORAL at 08:54

## 2024-09-07 RX ADMIN — ESCITALOPRAM OXALATE 20 MG: 10 TABLET, FILM COATED ORAL at 08:54

## 2024-09-07 RX ADMIN — PROPRANOLOL HYDROCHLORIDE 10 MG: 10 TABLET ORAL at 21:19

## 2024-09-07 RX ADMIN — CYANOCOBALAMIN TAB 1000 MCG 1000 MCG: 1000 TAB at 08:54

## 2024-09-07 RX ADMIN — METFORMIN HYDROCHLORIDE 500 MG: 500 TABLET, FILM COATED ORAL at 17:28

## 2024-09-07 RX ADMIN — GLYCOPYRROLATE 2 MG: 1 TABLET ORAL at 21:17

## 2024-09-07 RX ADMIN — CLOZAPINE 300 MG: 200 TABLET ORAL at 21:17

## 2024-09-07 RX ADMIN — CLOZAPINE 200 MG: 200 TABLET ORAL at 17:28

## 2024-09-07 RX ADMIN — NICOTINE POLACRILEX 2 MG: 2 GUM, CHEWING ORAL at 17:29

## 2024-09-07 RX ADMIN — PANTOPRAZOLE SODIUM 20 MG: 20 TABLET, DELAYED RELEASE ORAL at 08:54

## 2024-09-07 RX ADMIN — NICOTINE POLACRILEX 2 MG: 2 GUM, CHEWING ORAL at 08:54

## 2024-09-07 NOTE — PLAN OF CARE
Problem: Alteration in Thoughts and Perception  Goal: Treatment Goal: Gain control of psychotic behaviors/thinking, reduce/eliminate presenting symptoms and demonstrate improved reality functioning upon discharge  Outcome: Progressing  Goal: Verbalize thoughts and feelings  Description: Interventions:  - Promote a nonjudgmental and trusting relationship with the patient through active listening and therapeutic communication  - Assess patient's level of functioning, behavior and potential for risk  - Engage patient in 1 on 1 interactions  - Encourage patient to express fears, feelings, frustrations, and discuss symptoms    - [place] patient to reality, help patient recognize reality-based thinking   - Administer medications as ordered and assess for potential side effects  - Provide the patient education related to the signs and symptoms of the illness and desired effects of prescribed medications  Outcome: Progressing  Goal: Refrain from acting on delusional thinking/internal stimuli  Description: Interventions:  - Monitor patient closely, per order   - Utilize least restrictive measures   - Set reasonable limits, give positive feedback for acceptable   - Administer medications as ordered and monitor of potential side effects  Outcome: Progressing  Goal: Agree to be compliant with medication regime, as prescribed and report medication side effects  Description: Interventions:  - Offer appropriate PRN medication and supervise ingestion; conduct AIMS, as needed   Outcome: Progressing  Goal: Attend and participate in unit activities, including therapeutic, recreational, and educational groups  Description: Interventions:  -Encourage Visitation and family involvement in care  Outcome: Progressing  Goal: Recognize dysfunctional thoughts, communicate reality-based thoughts at the time of discharge  Description: Interventions:  - Provide medication and psycho-education to assist patient in compliance and developing  insight into his/her illness   Outcome: Progressing  Goal: Complete daily ADLs, including personal hygiene independently, as able  Description: Interventions:  - Observe, teach, and assist patient with ADLS  - Monitor and promote a balance of rest/activity, with adequate nutrition and elimination   Outcome: Progressing     Problem: Ineffective Coping  Goal: Identifies ineffective coping skills  Outcome: Progressing  Goal: Identifies healthy coping skills  Outcome: Progressing  Goal: Demonstrates healthy coping skills  Outcome: Progressing  Goal: Participates in unit activities  Description: Interventions:  - Provide therapeutic environment   - Provide required programming   - Redirect inappropriate behaviors   Outcome: Progressing  Goal: Patient/Family participate in treatment and DC plans  Description: Interventions:  - Provide therapeutic environment  Outcome: Progressing  Goal: Patient/Family verbalizes awareness of resources  Outcome: Progressing     Problem: Depression  Goal: Treatment Goal: Demonstrate behavioral control of depressive symptoms, verbalize feelings of improved mood/affect, and adopt new coping skills prior to discharge  Outcome: Progressing  Goal: Verbalize thoughts and feelings  Description: Interventions:  - Assess and re-assess patient's level of risk   - Engage patient in 1:1 interactions, daily, for a minimum of 15 minutes   - Encourage patient to express feelings, fears, frustrations, hopes   Outcome: Progressing  Goal: Refrain from harming self  Description: Interventions:  - Monitor patient closely, per order   - Supervise medication ingestion, monitor effects and side effects   Outcome: Progressing  Goal: Refrain from isolation  Description: Interventions:  - Develop a trusting relationship   - Encourage socialization   Outcome: Progressing  Goal: Refrain from self-neglect  Outcome: Progressing  Goal: Attend and participate in unit activities, including therapeutic, recreational, and  educational groups  Description: Interventions:  - Provide therapeutic and educational activities daily, encourage attendance and participation, and document same in the medical record   Outcome: Progressing  Goal: Complete daily ADLs, including personal hygiene independently, as able  Description: Interventions:  - Observe, teach, and assist patient with ADLS  -  Monitor and promote a balance of rest/activity, with adequate nutrition and elimination   Outcome: Progressing     Problem: Anxiety  Goal: Anxiety is at manageable level  Description: Interventions:  - Assess and monitor patient's anxiety level.   - Monitor for signs and symptoms (heart palpitations, chest pain, shortness of breath, headaches, nausea, feeling jumpy, restlessness, irritable, apprehensive).   - Collaborate with interdisciplinary team and initiate plan and interventions as ordered.  - [place] patient to unit/surroundings  - Explain treatment plan  - Encourage participation in care  - Encourage verbalization of concerns/fears  - Identify coping mechanisms  - Assist in developing anxiety-reducing skills  - Administer/offer alternative therapies  - Limit or eliminate stimulants  Outcome: Progressing     Problem: Alteration in Orientation  Goal: Treatment Goal: Demonstrate a reduction of confusion and improved orientation to person, place, time and/or situation upon discharge, according to optimum baseline/ability  Outcome: Progressing  Goal: Interact with staff daily  Description: Interventions:  - Assess and re-assess patient's level of orientation  - Engage patient in 1 on 1 interactions, daily, for a minimum of 15 minutes   - Establish rapport/trust with patient   Outcome: Progressing  Goal: Express concerns related to confused thinking related to:  Description: Interventions:  - Encourage patient to express feelings, fears, frustrations, hopes  - Assign consistent caregivers   - [place]/re-orient patient as needed  - Allow comfort items, as  appropriate  - Provide visual cues, signs, etc.   Outcome: Progressing  Goal: Allow medical examinations, as recommended  Description: Interventions:  - Provide physical/neurological exams and/or referrals, per provider   Outcome: Progressing  Goal: Cooperate with recommended testing/procedures  Description: Interventions:  - Determine need for ancillary testing  - Observe for mental status changes  - Implement falls/precaution protocol   Outcome: Progressing  Goal: Attend and participate in unit activities, including therapeutic, recreational, and educational groups  Description: Interventions:  - Provide therapeutic and educational activities daily, encourage attendance and participation, and document same in the medical record   - Provide appropriate opportunities for reminiscence   - Provide a consistent daily routine   - Encourage family contact/visitation   Outcome: Progressing  Goal: Complete daily ADLs, including personal hygiene independently, as able  Description: Interventions:  - Observe, teach, and assist patient with ADLS  Outcome: Progressing     Problem: DISCHARGE PLANNING - CARE MANAGEMENT  Goal: Discharge to post-acute care or home with appropriate resources  Description: INTERVENTIONS:  - Conduct assessment to determine patient/family and health care team treatment goals, and need for post-acute services based on payer coverage, community resources, and patient preferences, and barriers to discharge  - Address psychosocial, clinical, and financial barriers to discharge as identified in assessment in conjunction with the patient/family and health care team  - Arrange appropriate level of post-acute services according to patient's   needs and preference and payer coverage in collaboration with the physician and health care team  - Communicate with and update the patient/family, physician, and health care team regarding progress on the discharge plan  - Arrange appropriate transportation to  post-acute venues  Outcome: Progressing     Problem: Electroconvulsive therapy (ECT)  Goal: Treatment Goal: Demonstrate a reduction of confusion and improved orientation to person, place, time and/or situation upon discharge, according to optimum baseline/ability  Outcome: Progressing  Goal: Verbalizes/displays adequate comfort level or baseline comfort level  Description: Interventions:  - Encourage patient to monitor pain and request assistance  - Assess pain using appropriate pain scale  - Administer analgesics based on type and severity of pain and evaluate response  - Implement non-pharmacological measures as appropriate and evaluate response  - Consider cultural and social influences on pain and pain management  - Notify physician/advanced practitioner if interventions unsuccessful or patient reports new pain  Outcome: Progressing  Goal: Achieves stable or improved neurological status  Description: INTERVENTIONS  - Monitor and report changes in neurological status  - Monitor vital signs such as temperature, blood pressure, glucose, and any other labs ordered   - Initiate measures to prevent increased intracranial pressure  - Monitor for seizure activity and implement precautions if appropriate      Outcome: Progressing  Goal: Achieves maximal functionality and self care  Description: INTERVENTIONS  - Monitor swallowing and airway patency with patient fatigue and changes in neurological status  - Encourage and assist patient to increase activity and self care.   - Encourage visually impaired, hearing impaired and aphasic patients to use assistive/communication devices  Outcome: Progressing  Goal: Maintain or return mobility to safest level of function  Description: INTERVENTIONS:  - Assess patient's ability to carry out ADLs; assess patient's baseline for ADL function and identify physical deficits which impact ability to perform ADLs (bathing, care of mouth/teeth, toileting, grooming, dressing, etc.)  -  Assess/evaluate cause of self-care deficits   - Assess range of motion  - Assess patient's mobility  - Assess patient's need for assistive devices and provide as appropriate  - Encourage maximum independence but intervene and supervise when necessary  - Involve family in performance of ADLs  - Assess for home care needs following discharge   - Consider OT consult to assist with ADL evaluation and planning for discharge  - Provide patient education as appropriate  Outcome: Progressing  Goal: Absence of urinary retention  Description: INTERVENTIONS:  - Assess patient’s ability to void and empty bladder  - Monitor I/O  - Bladder scan as needed  - Discuss with physician/AP medications to alleviate retention as needed  - Discuss catheterization for long term situations as appropriate  Outcome: Progressing  Goal: Minimal or absence of nausea and/or vomiting  Description: INTERVENTIONS:  - Administer IV fluids if ordered to ensure adequate hydration  - Maintain NPO status until nausea and vomiting are resolved  - Nasogastric tube if ordered  - Administer ordered antiemetic medications as needed  - Provide nonpharmacologic comfort measures as appropriate  - Advance diet as tolerated, if ordered  - Consider nutrition services referral to assist patient with adequate nutrition and appropriate food choices  Outcome: Progressing  Goal: Maintains adequate nutritional intake  Description: INTERVENTIONS:  - Monitor percentage of each meal consumed  - Identify factors contributing to decreased intake, treat as appropriate  - Assist with meals as needed  - Monitor I&O, weight, and lab values if indicated  - Obtain nutrition services referral as needed  Outcome: Progressing

## 2024-09-07 NOTE — NURSING NOTE
Patient visible but only social with select female peer. Cooperative with staff and medication compliant as well. Behaviors controlled and appropriate. Offers no complaints of any kind.  No prn medication requested or required.

## 2024-09-07 NOTE — NURSING NOTE
Alert, cooperative and visible intermittently. No SI or HI noted. Denies depression, anxiety and pain. Did not attend any groups. Consumed 100% of breakfast and Refused lunch. Took all medication without prompting. Nicotine gum administered as ordered. Maintained on safe precautions without incident. Will continue to monitor progress and recovery program.

## 2024-09-07 NOTE — PROGRESS NOTES
Progress Note - Behavioral Health   Alberto Berumen 28 y.o. male MRN: [id number]  Unit/Bed#: [facility] 108-02 Encounter: [id number]    Assessment & Plan   Principal Problem:    Schizoaffective disorder, bipolar type (HCC)  Active Problems:    GERD (gastroesophageal reflux disease)    Medical clearance for psychiatric admission    Tobacco abuse    T wave inversion in EKG    Chronic idiopathic constipation    Confluent and reticulate papillomatosis    Primary hypertension    Elevated hemoglobin A1c    Bilateral lower extremity edema    Patient was seen for continuing care and treatment.  Case was discussed with the nursing staff.  On examination today, the patient was seen lying in his bed.  I call his name and he does not open his eyes.  Per staff report, the patient remains isolated to his room, he slept well throughout the night.  When awake, he has been observed to be social with 1 female peer on the unit.  He has been cooperative with care and treatment.  Current clozapine level is 820.  That was drawn on 5 September.  CBC has a consistent slightly elevated WBC of 12.0.  No other new clinical issues or concerns were expressed by patient or staff.  Continue current meds and treatment.  Discharge planning and disposition is ongoing.  Behavior over the last 24 hours has been unchanged.  The patient is sleeping well, eating well.  Reporting no side effects to medications and no new medical concerns.    Mental Status Evaluation:  Appearance:  age appropriate, casually dressed, and disheveled   Behavior:  evasive and guarded   Speech:  Poorly verbal.   Mood:  anxious and depressed   Affect:  flat   Thought Process:  circumstantial   Associations: circumstantial associations   Thought Content:  No overt delusions   Perceptual Disturbances: Auditory hallucinations without commands   Risk Potential: Suicidal Ideations none  Homicidal Ideations none  Potential for Aggression No   Sensorium:  person, place, and time/date    Memory:  recent and remote memory grossly intact   Consciousness:  alert and awake    Attention: attention span appeared shorter than expected for age   Insight:  fair   Judgment: fair   Gait/Station: normal gait/station   Motor Activity: no abnormal movements     Progress Toward Goals: Continues to report some improvement with ECT.    Recommended Treatment: Continue with group therapy, milieu therapy and occupational therapy.      Risks, benefits and possible side effects of Medications:   Risks, benefits, and possible side effects of medications explained to patient and patient verbalizes understanding.      Medications: current meds:   Current Facility-Administered Medications   Medication Dose Route Frequency    acetaminophen (TYLENOL) tablet 650 mg  650 mg Oral Q4H PRN    acetaminophen (TYLENOL) tablet 650 mg  650 mg Oral Q6H PRN    aluminum-magnesium hydroxide-simethicone (MAALOX) oral suspension 30 mL  30 mL Oral Q4H PRN    amLODIPine (NORVASC) tablet 5 mg  5 mg Oral Daily    ARIPiprazole (ABILIFY) tablet 15 mg  15 mg Oral Daily    Artificial Tears ophthalmic solution 1 drop  1 drop Both Eyes Q3H PRN    atropine (ISOPTO ATROPINE) 1 % ophthalmic solution 1 drop  1 drop Sublingual Daily PRN    haloperidol lactate (HALDOL) injection 2.5 mg  2.5 mg Intramuscular Q4H PRN Max 4/day    And    LORazepam (ATIVAN) injection 1 mg  1 mg Intramuscular Q4H PRN Max 4/day    And    benztropine (COGENTIN) injection 0.5 mg  0.5 mg Intramuscular Q4H PRN Max 4/day    benztropine (COGENTIN) injection 1 mg  1 mg Intramuscular Q4H PRN Max 6/day    haloperidol lactate (HALDOL) injection 5 mg  5 mg Intramuscular Q4H PRN Max 4/day    And    LORazepam (ATIVAN) injection 2 mg  2 mg Intramuscular Q4H PRN Max 4/day    And    benztropine (COGENTIN) injection 1 mg  1 mg Intramuscular Q4H PRN Max 4/day    benztropine (COGENTIN) tablet 1 mg  1 mg Oral Q4H PRN Max 6/day    benztropine (COGENTIN) tablet 1 mg  1 mg Oral Q4H PRN Max 6/day     bisacodyl (DULCOLAX) rectal suppository 10 mg  10 mg Rectal Daily PRN    calcium carbonate (TUMS) chewable tablet 500 mg  500 mg Oral BID PRN    clozapine (CLOZARIL) tablet 200 mg  200 mg Oral Before Dinner    cloZAPine (CLOZARIL) tablet 300 mg  300 mg Oral HS    cyanocobalamin (VITAMIN B-12) tablet 1,000 mcg  1,000 mcg Oral Daily    hydrOXYzine HCL (ATARAX) tablet 50 mg  50 mg Oral Q6H PRN Max 4/day    Or    diphenhydrAMINE (BENADRYL) injection 50 mg  50 mg Intramuscular Q6H PRN    hydrOXYzine HCL (ATARAX) tablet 50 mg  50 mg Oral Q6H PRN Max 4/day    Or    diphenhydrAMINE (BENADRYL) injection 50 mg  50 mg Intramuscular Q6H PRN    diphenhydrAMINE-zinc acetate (BENADRYL) 2-0.1 % cream   Topical BID PRN    escitalopram (LEXAPRO) tablet 20 mg  20 mg Oral Daily    famotidine (PEPCID) tablet 20 mg  20 mg Oral BID    fluticasone (FLONASE) 50 mcg/act nasal spray 1 spray  1 spray Each Nare Daily    glycopyrrolate (ROBINUL) tablet 1 mg  1 mg Oral BID (AM & Afternoon)    glycopyrrolate (ROBINUL) tablet 2 mg  2 mg Oral HS    haloperidol (HALDOL) tablet 1 mg  1 mg Oral Q6H PRN    haloperidol (HALDOL) tablet 2.5 mg  2.5 mg Oral Q4H PRN Max 4/day    haloperidol (HALDOL) tablet 5 mg  5 mg Oral Q4H PRN Max 4/day    hydroCHLOROthiazide tablet 12.5 mg  12.5 mg Oral Daily    hydrocortisone 1 % ointment   Topical 4x Daily PRN    hydrOXYzine HCL (ATARAX) tablet 100 mg  100 mg Oral Q6H PRN Max 4/day    Or    LORazepam (ATIVAN) injection 2 mg  2 mg Intramuscular Q6H PRN    hydrOXYzine HCL (ATARAX) tablet 100 mg  100 mg Oral Q6H PRN Max 4/day    Or    LORazepam (ATIVAN) injection 2 mg  2 mg Intramuscular Q6H PRN    hydrOXYzine HCL (ATARAX) tablet 25 mg  25 mg Oral Q6H PRN Max 4/day    ibuprofen (MOTRIN) tablet 600 mg  600 mg Oral Q8H PRN    lactated ringers infusion  50 mL/hr Intravenous Continuous    loratadine (CLARITIN) tablet 10 mg  10 mg Oral Daily    melatonin tablet 3 mg  3 mg Oral HS PRN    metFORMIN (GLUCOPHAGE) tablet 500 mg   500 mg Oral BID With Meals    methocarbamol (ROBAXIN) tablet 500 mg  500 mg Oral Q6H PRN    nicotine polacrilex (NICORETTE) gum 2 mg  2 mg Oral Q4H PRN    OLANZapine (ZyPREXA) tablet 5 mg  5 mg Oral Q4H PRN Max 3/day    Or    OLANZapine (ZyPREXA) IM injection 2.5 mg  2.5 mg Intramuscular Q4H PRN Max 3/day    OLANZapine (ZyPREXA) tablet 5 mg  5 mg Oral Q3H PRN Max 3/day    Or    OLANZapine (ZyPREXA) IM injection 5 mg  5 mg Intramuscular Q3H PRN Max 3/day    OLANZapine (ZyPREXA) tablet 2.5 mg  2.5 mg Oral Q4H PRN Max 6/day    ondansetron (ZOFRAN-ODT) dispersible tablet 4 mg  4 mg Oral Q6H PRN    pantoprazole (PROTONIX) EC tablet 20 mg  20 mg Oral QAM    polyethylene glycol (MIRALAX) packet 17 g  17 g Oral Daily PRN    propranolol (INDERAL) tablet 10 mg  10 mg Oral Q12H KAYLIE    senna-docusate sodium (SENOKOT S) 8.6-50 mg per tablet 1 tablet  1 tablet Oral Daily PRN    senna-docusate sodium (SENOKOT S) 8.6-50 mg per tablet 2 tablet  2 tablet Oral HS    traZODone (DESYREL) tablet 50 mg  50 mg Oral HS PRN    white petrolatum-mineral oil (EUCERIN,HYDROCERIN) cream   Topical TID PRN   .    Labs: I have personally reviewed all pertinent laboratory/tests results. Most Recent Labs:   Lab Results   Component Value Date    WBC 12.00 (H) 09/05/2024    RBC 5.92 (H) 09/05/2024    HGB 13.8 09/05/2024    HCT 46.5 09/05/2024     09/05/2024    RDW 14.4 09/05/2024    NEUTROABS 6.33 09/05/2024    SODIUM 136 06/03/2024    K 3.8 06/03/2024    CL 96 06/03/2024    CO2 29 06/03/2024    BUN 10 06/03/2024    CREATININE 1.00 06/03/2024    GLUC 97 06/03/2024    GLUF 103 (H) 05/29/2024    CALCIUM 10.1 06/03/2024    AST 33 06/03/2024    ALT 72 (H) 06/03/2024    ALKPHOS 80 06/03/2024    TP 8.5 (H) 06/03/2024    ALB 4.7 06/03/2024    TBILI 0.30 06/03/2024    CHOLESTEROL 156 03/14/2024    HDL 44 03/14/2024    TRIG 133 03/14/2024    LDLCALC 85 03/14/2024    NONHDLC 112 03/14/2024    LITHIUM 0.61 01/09/2024    [id number] 1.062 11/15/2023     HGBA1C 5.0 04/01/2024    EAG 97 04/01/2024       Counseling / Coordination of Care  Total floor / unit time spent today 30 minutes. Greater than 50% of total time was spent with the patient and / or family counseling and / or coordination of care. A description of the counseling / coordination of care: Medication management, treatment and chart review

## 2024-09-07 NOTE — NURSING NOTE
Alert, cooperative and visible intermittently pacing the unit with female peer. Consumed 75% of dinner. Took all medication without prompting. Maintained on safe precautions without incident.

## 2024-09-07 NOTE — NURSING NOTE
Slept well throughout the night. No signs of distress noted this shift.  Eyes closed and chest movements noted

## 2024-09-08 VITALS
TEMPERATURE: 98.3 F | DIASTOLIC BLOOD PRESSURE: 89 MMHG | HEIGHT: 66 IN | SYSTOLIC BLOOD PRESSURE: 158 MMHG | WEIGHT: 249 LBS | HEART RATE: 107 BPM | OXYGEN SATURATION: 99 % | RESPIRATION RATE: 18 BRPM | BODY MASS INDEX: 40.02 KG/M2

## 2024-09-08 PROCEDURE — 99232 SBSQ HOSP IP/OBS MODERATE 35: CPT | Performed by: NURSE PRACTITIONER

## 2024-09-08 RX ADMIN — FAMOTIDINE 20 MG: 20 TABLET ORAL at 17:25

## 2024-09-08 RX ADMIN — SENNOSIDES AND DOCUSATE SODIUM 2 TABLET: 8.6; 5 TABLET ORAL at 21:10

## 2024-09-08 RX ADMIN — CLOZAPINE 300 MG: 200 TABLET ORAL at 21:11

## 2024-09-08 RX ADMIN — GLYCOPYRROLATE 2 MG: 1 TABLET ORAL at 21:11

## 2024-09-08 RX ADMIN — NICOTINE POLACRILEX 2 MG: 2 GUM, CHEWING ORAL at 21:11

## 2024-09-08 RX ADMIN — ARIPIPRAZOLE 15 MG: 15 TABLET ORAL at 08:36

## 2024-09-08 RX ADMIN — METFORMIN HYDROCHLORIDE 500 MG: 500 TABLET, FILM COATED ORAL at 17:25

## 2024-09-08 RX ADMIN — HYDROCHLOROTHIAZIDE 12.5 MG: 12.5 TABLET ORAL at 08:36

## 2024-09-08 RX ADMIN — LORATADINE 10 MG: 10 TABLET ORAL at 08:36

## 2024-09-08 RX ADMIN — CYANOCOBALAMIN TAB 1000 MCG 1000 MCG: 1000 TAB at 08:37

## 2024-09-08 RX ADMIN — PROPRANOLOL HYDROCHLORIDE 10 MG: 10 TABLET ORAL at 21:11

## 2024-09-08 RX ADMIN — GLYCOPYRROLATE 1 MG: 1 TABLET ORAL at 14:41

## 2024-09-08 RX ADMIN — NICOTINE POLACRILEX 2 MG: 2 GUM, CHEWING ORAL at 17:33

## 2024-09-08 RX ADMIN — PANTOPRAZOLE SODIUM 20 MG: 20 TABLET, DELAYED RELEASE ORAL at 08:37

## 2024-09-08 RX ADMIN — GLYCOPYRROLATE 1 MG: 1 TABLET ORAL at 08:36

## 2024-09-08 RX ADMIN — METFORMIN HYDROCHLORIDE 500 MG: 500 TABLET, FILM COATED ORAL at 08:36

## 2024-09-08 RX ADMIN — FAMOTIDINE 20 MG: 20 TABLET ORAL at 08:37

## 2024-09-08 RX ADMIN — AMLODIPINE BESYLATE 5 MG: 5 TABLET ORAL at 08:36

## 2024-09-08 RX ADMIN — CLOZAPINE 200 MG: 200 TABLET ORAL at 17:25

## 2024-09-08 RX ADMIN — ESCITALOPRAM OXALATE 20 MG: 10 TABLET, FILM COATED ORAL at 08:36

## 2024-09-08 RX ADMIN — PROPRANOLOL HYDROCHLORIDE 10 MG: 10 TABLET ORAL at 08:36

## 2024-09-08 RX ADMIN — NICOTINE POLACRILEX 2 MG: 2 GUM, CHEWING ORAL at 12:33

## 2024-09-08 NOTE — NURSING NOTE
Patient visible all evening with select peer watching a movie in the dining room.  Patient cooperative with staff and offers no complaints. Behaviors controlled and appropriate. No prn medication requested or required.

## 2024-09-08 NOTE — PROGRESS NOTES
Progress Note - Behavioral Health   Alberto Berumen 28 y.o. male MRN: [id number]  Unit/Bed#: [facility] 108-02 Encounter: [id number]    Assessment & Plan   Principal Problem:    Schizoaffective disorder, bipolar type (HCC)  Active Problems:    GERD (gastroesophageal reflux disease)    Medical clearance for psychiatric admission    Tobacco abuse    T wave inversion in EKG    Chronic idiopathic constipation    Confluent and reticulate papillomatosis    Primary hypertension    Elevated hemoglobin A1c    Bilateral lower extremity edema    Patient was seen for continuing care and treatment.  Case was discussed with nursing staff.  Patient is scheduled to receive his last ECT treatment on September 13.  He is seen sitting in the hallway isolative to self.  He does respond hello on approach.  He offers no complaints.  Out of his room most of last evening.  Behavior over the last 24 hours has been unchanged.  The patient is sleeping well and eating well.  No complaints of any medication side effects and no new medical concerns.    Mental Status Evaluation:  Appearance:  age appropriate, casually dressed, and disheveled   Behavior:  evasive and guarded   Speech:  soft   Mood:  depressed   Affect:  flat   Thought Process:  circumstantial   Associations: circumstantial associations   Thought Content:  No overt delusions   Perceptual Disturbances: Auditory hallucinations without commands   Risk Potential: Suicidal Ideations none  Homicidal Ideations none  Potential for Aggression No   Sensorium:  person, place, time/date, and situation   Memory:  recent and remote memory grossly intact   Consciousness:  alert and awake    Attention: attention span appeared shorter than expected for age   Insight:  fair   Judgment: fair   Gait/Station: normal gait/station   Motor Activity: no abnormal movements     Progress Toward Goals: Remains depressed and anxious.  ECT to resume tomorrow.    Recommended Treatment: Continue with group therapy,  milieu therapy and occupational therapy.      Risks, benefits and possible side effects of Medications:   Risks, benefits, and possible side effects of medications explained to patient and patient verbalizes understanding.      Medications: current meds:   Current Facility-Administered Medications   Medication Dose Route Frequency    acetaminophen (TYLENOL) tablet 650 mg  650 mg Oral Q4H PRN    acetaminophen (TYLENOL) tablet 650 mg  650 mg Oral Q6H PRN    aluminum-magnesium hydroxide-simethicone (MAALOX) oral suspension 30 mL  30 mL Oral Q4H PRN    amLODIPine (NORVASC) tablet 5 mg  5 mg Oral Daily    ARIPiprazole (ABILIFY) tablet 15 mg  15 mg Oral Daily    Artificial Tears ophthalmic solution 1 drop  1 drop Both Eyes Q3H PRN    atropine (ISOPTO ATROPINE) 1 % ophthalmic solution 1 drop  1 drop Sublingual Daily PRN    haloperidol lactate (HALDOL) injection 2.5 mg  2.5 mg Intramuscular Q4H PRN Max 4/day    And    LORazepam (ATIVAN) injection 1 mg  1 mg Intramuscular Q4H PRN Max 4/day    And    benztropine (COGENTIN) injection 0.5 mg  0.5 mg Intramuscular Q4H PRN Max 4/day    benztropine (COGENTIN) injection 1 mg  1 mg Intramuscular Q4H PRN Max 6/day    haloperidol lactate (HALDOL) injection 5 mg  5 mg Intramuscular Q4H PRN Max 4/day    And    LORazepam (ATIVAN) injection 2 mg  2 mg Intramuscular Q4H PRN Max 4/day    And    benztropine (COGENTIN) injection 1 mg  1 mg Intramuscular Q4H PRN Max 4/day    benztropine (COGENTIN) tablet 1 mg  1 mg Oral Q4H PRN Max 6/day    benztropine (COGENTIN) tablet 1 mg  1 mg Oral Q4H PRN Max 6/day    bisacodyl (DULCOLAX) rectal suppository 10 mg  10 mg Rectal Daily PRN    calcium carbonate (TUMS) chewable tablet 500 mg  500 mg Oral BID PRN    clozapine (CLOZARIL) tablet 200 mg  200 mg Oral Before Dinner    cloZAPine (CLOZARIL) tablet 300 mg  300 mg Oral HS    cyanocobalamin (VITAMIN B-12) tablet 1,000 mcg  1,000 mcg Oral Daily    hydrOXYzine HCL (ATARAX) tablet 50 mg  50 mg Oral Q6H PRN  Max 4/day    Or    diphenhydrAMINE (BENADRYL) injection 50 mg  50 mg Intramuscular Q6H PRN    hydrOXYzine HCL (ATARAX) tablet 50 mg  50 mg Oral Q6H PRN Max 4/day    Or    diphenhydrAMINE (BENADRYL) injection 50 mg  50 mg Intramuscular Q6H PRN    diphenhydrAMINE-zinc acetate (BENADRYL) 2-0.1 % cream   Topical BID PRN    escitalopram (LEXAPRO) tablet 20 mg  20 mg Oral Daily    famotidine (PEPCID) tablet 20 mg  20 mg Oral BID    fluticasone (FLONASE) 50 mcg/act nasal spray 1 spray  1 spray Each Nare Daily    glycopyrrolate (ROBINUL) tablet 1 mg  1 mg Oral BID (AM & Afternoon)    glycopyrrolate (ROBINUL) tablet 2 mg  2 mg Oral HS    haloperidol (HALDOL) tablet 1 mg  1 mg Oral Q6H PRN    haloperidol (HALDOL) tablet 2.5 mg  2.5 mg Oral Q4H PRN Max 4/day    haloperidol (HALDOL) tablet 5 mg  5 mg Oral Q4H PRN Max 4/day    hydroCHLOROthiazide tablet 12.5 mg  12.5 mg Oral Daily    hydrocortisone 1 % ointment   Topical 4x Daily PRN    hydrOXYzine HCL (ATARAX) tablet 100 mg  100 mg Oral Q6H PRN Max 4/day    Or    LORazepam (ATIVAN) injection 2 mg  2 mg Intramuscular Q6H PRN    hydrOXYzine HCL (ATARAX) tablet 100 mg  100 mg Oral Q6H PRN Max 4/day    Or    LORazepam (ATIVAN) injection 2 mg  2 mg Intramuscular Q6H PRN    hydrOXYzine HCL (ATARAX) tablet 25 mg  25 mg Oral Q6H PRN Max 4/day    ibuprofen (MOTRIN) tablet 600 mg  600 mg Oral Q8H PRN    lactated ringers infusion  50 mL/hr Intravenous Continuous    loratadine (CLARITIN) tablet 10 mg  10 mg Oral Daily    melatonin tablet 3 mg  3 mg Oral HS PRN    metFORMIN (GLUCOPHAGE) tablet 500 mg  500 mg Oral BID With Meals    methocarbamol (ROBAXIN) tablet 500 mg  500 mg Oral Q6H PRN    nicotine polacrilex (NICORETTE) gum 2 mg  2 mg Oral Q4H PRN    OLANZapine (ZyPREXA) tablet 5 mg  5 mg Oral Q4H PRN Max 3/day    Or    OLANZapine (ZyPREXA) IM injection 2.5 mg  2.5 mg Intramuscular Q4H PRN Max 3/day    OLANZapine (ZyPREXA) tablet 5 mg  5 mg Oral Q3H PRN Max 3/day    Or    OLANZapine  (ZyPREXA) IM injection 5 mg  5 mg Intramuscular Q3H PRN Max 3/day    OLANZapine (ZyPREXA) tablet 2.5 mg  2.5 mg Oral Q4H PRN Max 6/day    ondansetron (ZOFRAN-ODT) dispersible tablet 4 mg  4 mg Oral Q6H PRN    pantoprazole (PROTONIX) EC tablet 20 mg  20 mg Oral QAM    polyethylene glycol (MIRALAX) packet 17 g  17 g Oral Daily PRN    propranolol (INDERAL) tablet 10 mg  10 mg Oral Q12H KAYLEI    senna-docusate sodium (SENOKOT S) 8.6-50 mg per tablet 1 tablet  1 tablet Oral Daily PRN    senna-docusate sodium (SENOKOT S) 8.6-50 mg per tablet 2 tablet  2 tablet Oral HS    traZODone (DESYREL) tablet 50 mg  50 mg Oral HS PRN    white petrolatum-mineral oil (EUCERIN,HYDROCERIN) cream   Topical TID PRN   .    Labs: I have personally reviewed all pertinent laboratory/tests results. Most Recent Labs:   Lab Results   Component Value Date    WBC 12.00 (H) 09/05/2024    RBC 5.92 (H) 09/05/2024    HGB 13.8 09/05/2024    HCT 46.5 09/05/2024     09/05/2024    RDW 14.4 09/05/2024    NEUTROABS 6.33 09/05/2024    SODIUM 136 06/03/2024    K 3.8 06/03/2024    CL 96 06/03/2024    CO2 29 06/03/2024    BUN 10 06/03/2024    CREATININE 1.00 06/03/2024    GLUC 97 06/03/2024    GLUF 103 (H) 05/29/2024    CALCIUM 10.1 06/03/2024    AST 33 06/03/2024    ALT 72 (H) 06/03/2024    ALKPHOS 80 06/03/2024    TP 8.5 (H) 06/03/2024    ALB 4.7 06/03/2024    TBILI 0.30 06/03/2024    CHOLESTEROL 156 03/14/2024    HDL 44 03/14/2024    TRIG 133 03/14/2024    LDLCALC 85 03/14/2024    NONHDLC 112 03/14/2024    LITHIUM 0.61 01/09/2024    [id number] 1.062 11/15/2023    HGBA1C 5.0 04/01/2024    EAG 97 04/01/2024       Counseling / Coordination of Care  Total floor / unit time spent today 30 minutes. Greater than 50% of total time was spent with the patient and / or family counseling and / or coordination of care. A description of the counseling / coordination of care: Medication management, treatment and chart review

## 2024-09-08 NOTE — NURSING NOTE
Pt is calm and cooperative, visible on the unit. Pt was happy, says he had a great visit with his family. Pt ate 50% of dinner. Pt is medication compliant, safety checks ongoing.

## 2024-09-08 NOTE — NURSING NOTE
Pt has been visible on the unit, social with peers and staff. Pt is pleasant and cooperative with assessments. Pt denies anxiety and depression, denies pain. Pt denies SI/HI/VH, reports AH voices telling him they are going to kill him when he gets out the hospital. Pt is meal, medication,and group complaint. Safety checks ongoing.

## 2024-09-09 PROCEDURE — 99232 SBSQ HOSP IP/OBS MODERATE 35: CPT | Performed by: PSYCHIATRY & NEUROLOGY

## 2024-09-09 RX ADMIN — FLUTICASONE PROPIONATE 1 SPRAY: 50 SPRAY, METERED NASAL at 08:14

## 2024-09-09 RX ADMIN — CLOZAPINE 200 MG: 200 TABLET ORAL at 17:08

## 2024-09-09 RX ADMIN — CYANOCOBALAMIN TAB 1000 MCG 1000 MCG: 1000 TAB at 08:15

## 2024-09-09 RX ADMIN — NICOTINE POLACRILEX 2 MG: 2 GUM, CHEWING ORAL at 21:29

## 2024-09-09 RX ADMIN — GLYCOPYRROLATE 1 MG: 1 TABLET ORAL at 13:03

## 2024-09-09 RX ADMIN — PROPRANOLOL HYDROCHLORIDE 10 MG: 10 TABLET ORAL at 21:29

## 2024-09-09 RX ADMIN — METFORMIN HYDROCHLORIDE 500 MG: 500 TABLET, FILM COATED ORAL at 17:09

## 2024-09-09 RX ADMIN — NICOTINE POLACRILEX 2 MG: 2 GUM, CHEWING ORAL at 13:03

## 2024-09-09 RX ADMIN — PANTOPRAZOLE SODIUM 20 MG: 20 TABLET, DELAYED RELEASE ORAL at 08:26

## 2024-09-09 RX ADMIN — NICOTINE POLACRILEX 2 MG: 2 GUM, CHEWING ORAL at 08:14

## 2024-09-09 RX ADMIN — ESCITALOPRAM OXALATE 20 MG: 10 TABLET, FILM COATED ORAL at 08:23

## 2024-09-09 RX ADMIN — METFORMIN HYDROCHLORIDE 500 MG: 500 TABLET, FILM COATED ORAL at 08:26

## 2024-09-09 RX ADMIN — PROPRANOLOL HYDROCHLORIDE 10 MG: 10 TABLET ORAL at 08:15

## 2024-09-09 RX ADMIN — ARIPIPRAZOLE 15 MG: 15 TABLET ORAL at 08:15

## 2024-09-09 RX ADMIN — GLYCOPYRROLATE 1 MG: 1 TABLET ORAL at 08:22

## 2024-09-09 RX ADMIN — GLYCOPYRROLATE 2 MG: 1 TABLET ORAL at 21:29

## 2024-09-09 RX ADMIN — CLOZAPINE 300 MG: 200 TABLET ORAL at 21:29

## 2024-09-09 RX ADMIN — HYDROCHLOROTHIAZIDE 12.5 MG: 12.5 TABLET ORAL at 08:23

## 2024-09-09 RX ADMIN — NICOTINE POLACRILEX 2 MG: 2 GUM, CHEWING ORAL at 17:08

## 2024-09-09 RX ADMIN — FAMOTIDINE 20 MG: 20 TABLET ORAL at 17:08

## 2024-09-09 RX ADMIN — AMLODIPINE BESYLATE 5 MG: 5 TABLET ORAL at 08:15

## 2024-09-09 RX ADMIN — SENNOSIDES AND DOCUSATE SODIUM 2 TABLET: 8.6; 5 TABLET ORAL at 21:29

## 2024-09-09 RX ADMIN — LORATADINE 10 MG: 10 TABLET ORAL at 08:15

## 2024-09-09 RX ADMIN — FAMOTIDINE 20 MG: 20 TABLET ORAL at 08:23

## 2024-09-09 NOTE — NURSING NOTE
Patient has been visible on the unit at intervals ambulating hallways with female peer JASKARAN Ann flat. He continues to have auditory hallucinations of people telling him they are going to harm him and his family.  Support offered. He offers no other complaints or suicidal ideations. Medication compliant. Attends some groups. Upcoming ECT treatment.

## 2024-09-09 NOTE — PROGRESS NOTES
"Psychiatry Progress Note [facility]    Alberto Berumen 28 y.o. male MRN: [id number]  Unit/Bed#: [facility] 108-02 Encounter: [id number]  Code Status: Level 1 - Full Code    PCP: Joce Juan MD    Date of Admission:  3/29/2024 2008   Date of Service:  09/09/24    Patient Active Problem List   Diagnosis    GERD (gastroesophageal reflux disease)    Medical clearance for psychiatric admission    Schizoaffective disorder, bipolar type ([facility])    Tobacco abuse    T wave inversion in EKG    Syringoma    Chronic idiopathic constipation    Vitamin B 12 deficiency    Vitamin D deficiency    Confluent and reticulate papillomatosis    Class 2 obesity in adult    Primary hypertension    Elevated hemoglobin A1c    Bilateral lower extremity edema         Review of systems: No symptoms reported.  Psychiatric Diagnosis: Schizoaffective Disorder, Bipolar type     Assessment  Overall Status: Reported that overall he was feeling less tired and that he had a good weekend. He reported feeling \"good\" but continues to complain of depressive symptoms. He reported that he continues to hear threatening voices.  Certification Statement: The patient will continue to require additional inpatient hospital stay due to continued psychotic and severe symptoms of depression.        Medications: Abilify 15 mg po daily, clozaril 200 mg po at dinner and 300 mg po HS, B12 1000 mcg po daily, lexapro 20 mg po daily, glycopyrrolate 1 mg BID po and 2 mg HS, propranolol 10 mg po BID, senna docusate sodium 2 tablet HS  Side effects from treatment: Continued drooling, stated he continues to forget to ask for drops.  Medication changes   None    Medication education   Risks side effects benefits and precautions of medications discussed with patient and he did verbalize an understanding about risks for metabolic syndrome from being on neuroleptics and is form tardive dyskinesia etc.  All medications reviewed and I recommend that they be " continued for symptom management   Understanding of medications: Verbalized     Justification for dual anti-psychotics: Inability to control psychotic symptoms     Non-pharmacological treatments  Continue with individual, group, milieu and occupational therapy using recovery principles and psycho-education about accepting illness and the need for treatment.  Continue working on dipo   Last session of ECT on 9/13    Safety  Safety and communication plan established to target dynamic risk factors discussed above.    Discharge Plan   Group home vs aunt's home    Interval Progress   Patient states that he slept well last night and believes he sleeps from about 23:30 to 08:00 and feels rested when he awakens. He reported having a good appetite. He denied side effects from medications other than continued drooling which we encouraged the patient to try to remember to ask for drops to help. He denied any active feelings of suicidal thoughts, intents, or plans, but states that he continues to hear threatening voices frequently. He denied any self injurious thoughts since about a month ago.  Acceptance by patient: Yes   Hopefulness in recovery: Hoping to live with aunt   Involved in reintegration process: Aunt   Trusting in relationship with psychiatrist: Yes  Sleep: Adequate, approximately 8 hours per night  Appetite: Good, eating three meals per day  Compliance with Medications: Good  Group attendance: 4/9  Significant events: Patient reported that he feels safe in the hospital and reported that his depression today was about a 6.5/10.       Mental Status Exam  Appearance: marginal hygiene, dressed in hospital attire, looks stated age, overweight, good eye contact.  Behavior: cooperative, calm, walking around the unit  Speech: normal rate, normal volume, normal pitch  Mood: depressed  Affect:  depressed, blunted, mood congruent  Thought Process: organized, logical  Thought Content: paranoid ideation, stating that he feels  the voices in his head are out to get him. He denies any active suicidal or homicidal thoughts, plans, or intents. He denies any obsessions, compulsions, or distorted body perceptions. He denies any other experiences that would be categorized as grandiose, somatic, or bizarre delusions.    Perceptual Disturbances: no visual hallucinations, auditory hallucinations telling him that they cannot wait for him to get out of the hospital so they can kill him. He stated that sometimes the voices are sometimes his ex girlfriend, but other times it is a stranger. He stated that they planned to shoot him and his family but does not say when. He says the voices occur all the time and that they sometimes occur when he is sleeping. , does not appear responding to internal stimuli  Hx Risk Factors: chronic psychiatric problems, chronic depressive symptoms, chronic psychotic symptoms  Sensorium: alert and oriented to person, place, time and situation  Cognition: recent and remote memory grossly intact  Consciousness: alert and awake  Attention: attention span and concentration are age appropriate  Intellect: appears to be of average intelligence  Insight: limited  Judgement: impaired  Motor Activity: no abnormal movements     Vitals  Temp:  [97.6 °F (36.4 °C)-98.3 °F (36.8 °C)] 97.6 °F (36.4 °C)  HR:  [] 92  Resp:  [18] 18  BP: (124-158)/(62-89) 124/62  SpO2:  [99 %-100 %] 100 %  No intake or output data in the 24 hours ending 09/09/24 1049    Lab Results: All Labs For Current Hospital Admission Reviewed    Current Facility-Administered Medications   Medication Dose Route Frequency Provider Last Rate    acetaminophen  650 mg Oral Q4H PRN Jordan C Holter, DO      acetaminophen  650 mg Oral Q6H PRN HOLLI Lion      aluminum-magnesium hydroxide-simethicone  30 mL Oral Q4H PRN Jordan C Holter, DO      amLODIPine  5 mg Oral Daily HOLLI Lion      ARIPiprazole  15 mg Oral Daily Bora Rosario MD      Artificial Tears   1 drop Both Eyes Q3H PRN Jordan C Holter, DO      atropine  1 drop Sublingual Daily PRN Eveline Hunt, CRNP      haloperidol lactate  2.5 mg Intramuscular Q4H PRN Max 4/day Eveline Hunt, CRNP      And    LORazepam  1 mg Intramuscular Q4H PRN Max 4/day Evelinevipul Hunt, CRNP      And    benztropine  0.5 mg Intramuscular Q4H PRN Max 4/day Evelinevipul Hunt, CRNP      benztropine  1 mg Intramuscular Q4H PRN Max 6/day Ion Dupontter, DO      haloperidol lactate  5 mg Intramuscular Q4H PRN Max 4/day Eveline Hunt, CRNP      And    LORazepam  2 mg Intramuscular Q4H PRN Max 4/day Eveline Hunt, CRNP      And    benztropine  1 mg Intramuscular Q4H PRN Max 4/day Evelinevipul Hunt, CRNP      benztropine  1 mg Oral Q4H PRN Max 6/day Eveline Hunt, CRNP      benztropine  1 mg Oral Q4H PRN Max 6/day Jordan C Holter, DO      bisacodyl  10 mg Rectal Daily PRN Eveline Hunt, CRNP      calcium carbonate  500 mg Oral BID PRN HOLLI Monge      cloZAPine  200 mg Oral Before Dinner Bora Rosario MD      cloZAPine  300 mg Oral HS Bora Rosario MD      cyanocobalamin  1,000 mcg Oral Daily HOLLI Galvan      hydrOXYzine HCL  50 mg Oral Q6H PRN Max 4/day Jordan C Holter, DO      Or    diphenhydrAMINE  50 mg Intramuscular Q6H PRN Jordan C Holter, DO      hydrOXYzine HCL  50 mg Oral Q6H PRN Max 4/day HOLLI Lion      Or    diphenhydrAMINE  50 mg Intramuscular Q6H PRN HOLLI Lion      diphenhydrAMINE-zinc acetate   Topical BID PRN HOLLI Lion      escitalopram  20 mg Oral Daily Eveline Hunt, HOLLI      famotidine  20 mg Oral BID HOLLI Monge      fluticasone  1 spray Each Nare Daily Brina Guillen MD      glycopyrrolate  1 mg Oral BID (AM & Afternoon) Bora Rosario MD      glycopyrrolate  2 mg Oral HS Bora Rosario MD      haloperidol  1 mg Oral Q6H PRN HOLLI Lion      haloperidol  2.5 mg Oral Q4H PRN Max 4/day HOLLI Lion      haloperidol  5 mg Oral Q4H PRN Max 4/day Eveline Hunt,  HOLLI      hydroCHLOROthiazide  12.5 mg Oral Daily HOLLI Galvan      hydrocortisone   Topical 4x Daily PRN HOLLI Lion      hydrOXYzine HCL  100 mg Oral Q6H PRN Max 4/day [facility] Holter, DO      Or    LORazepam  2 mg Intramuscular Q6H PRN [facility] Holter, DO      hydrOXYzine HCL  100 mg Oral Q6H PRN Max 4/day HOLLI Lion      Or    LORazepam  2 mg Intramuscular Q6H PRN HOLLI Lion      hydrOXYzine HCL  25 mg Oral Q6H PRN Max 4/day [facility] Holter, DO      ibuprofen  600 mg Oral Q8H PRN HOLLI Lion      lactated ringers  50 mL/hr Intravenous Continuous Antwan Dong MD      loratadine  10 mg Oral Daily Brina Guillen MD      melatonin  3 mg Oral HS PRN Bora Rosario MD      metFORMIN  500 mg Oral BID With Meals HOLLI Galvan      methocarbamol  500 mg Oral Q6H PRN HOLLI Lion      nicotine polacrilex  2 mg Oral Q4H PRN Bora Rosario MD      OLANZapine  5 mg Oral Q4H PRN Max 3/day [facility] Holter, DO      Or    OLANZapine  2.5 mg Intramuscular Q4H PRN Max 3/day [facility] Holter, DO      OLANZapine  5 mg Oral Q3H PRN Max 3/day [facility] Holter, DO      Or    OLANZapine  5 mg Intramuscular Q3H PRN Max 3/day [facility] Holter, DO      OLANZapine  2.5 mg Oral Q4H PRN Max 6/day [facility] Holter, DO      ondansetron  4 mg Oral Q6H PRN HOLLI Lion      pantoprazole  20 mg Oral QAM [facility] Holter, DO      polyethylene glycol  17 g Oral Daily PRN HOLLI Ghotra      propranolol  10 mg Oral Q12H KAYLIE HLOLI Lion      senna-docusate sodium  1 tablet Oral Daily PRN [facility] Holter, DO      senna-docusate sodium  2 tablet Oral HS Melvina Ambron, DO      traZODone  50 mg Oral HS PRN HOLLI Lino      white petrolatum-mineral oil   Topical TID PRN HOLLI Lion         Counseling / Coordination of Care: Total floor / unit time spent today 15 minutes. Greater than 50% of total time was spent with the patient and / or family counseling and / or  somewhat receptive to supportive listening and teaching positive coping skills to deal with symptom mangement.     Patient's Rights, confidentiality and exceptions to confidentiality, use of automated medical record, Behavioral Health Services staff access to medical record, and consent to treatment reviewed.    This note has been dictated and hence there may be problems with punctuation, spelling and formatting and if anyone has any concerns please address them to the writer.  This note is not shared with patient due to potential for making patient's condition worse by knowing the content of the note.     Eveline WALLACE

## 2024-09-09 NOTE — SOCIAL WORK
SW and pt met 1:1  Pt reports his visit with family went well. Pt denied any current concerns. SW and pt processed additional thoughts related to discharge planning.

## 2024-09-09 NOTE — PROGRESS NOTES
09/09/24 0732   Team Meeting   Meeting Type Daily Rounds   Team Members Present   Team Members Present Physician;Nurse;;Other (Discipline and Name)   Patient/Family Present   Patient Present No   Patient's Family Present No     In attendance:  Dr. Alex Thomas, MD Dr. Jordan Holter, DO Guy Taylor, RN  Luisana Alvarado, [facility]  Carla Lux, [facility]  Irais Mcdowell, M.S.    Groups: 4/9    Pt exhibiting positive bx, more alert, reports less tired. Pt had positive in person visit with family. Pt's last ECT will be 9/13. Pt appropriate with female peer.

## 2024-09-09 NOTE — PLAN OF CARE
Problem: Alteration in Thoughts and Perception  Goal: Treatment Goal: Gain control of psychotic behaviors/thinking, reduce/eliminate presenting symptoms and demonstrate improved reality functioning upon discharge  Outcome: Progressing  Goal: Verbalize thoughts and feelings  Description: Interventions:  - Promote a nonjudgmental and trusting relationship with the patient through active listening and therapeutic communication  - Assess patient's level of functioning, behavior and potential for risk  - Engage patient in 1 on 1 interactions  - Encourage patient to express fears, feelings, frustrations, and discuss symptoms    - [place] patient to reality, help patient recognize reality-based thinking   - Administer medications as ordered and assess for potential side effects  - Provide the patient education related to the signs and symptoms of the illness and desired effects of prescribed medications  Outcome: Progressing  Goal: Refrain from acting on delusional thinking/internal stimuli  Description: Interventions:  - Monitor patient closely, per order   - Utilize least restrictive measures   - Set reasonable limits, give positive feedback for acceptable   - Administer medications as ordered and monitor of potential side effects  Outcome: Progressing  Goal: Agree to be compliant with medication regime, as prescribed and report medication side effects  Description: Interventions:  - Offer appropriate PRN medication and supervise ingestion; conduct AIMS, as needed   Outcome: Progressing  Goal: Attend and participate in unit activities, including therapeutic, recreational, and educational groups  Description: Interventions:  -Encourage Visitation and family involvement in care  Outcome: Progressing  Goal: Recognize dysfunctional thoughts, communicate reality-based thoughts at the time of discharge  Description: Interventions:  - Provide medication and psycho-education to assist patient in compliance and developing  insight into his/her illness   Outcome: Progressing  Goal: Complete daily ADLs, including personal hygiene independently, as able  Description: Interventions:  - Observe, teach, and assist patient with ADLS  - Monitor and promote a balance of rest/activity, with adequate nutrition and elimination   Outcome: Progressing     Problem: Ineffective Coping  Goal: Identifies ineffective coping skills  Outcome: Progressing  Goal: Identifies healthy coping skills  Outcome: Progressing  Goal: Demonstrates healthy coping skills  Outcome: Progressing  Goal: Participates in unit activities  Description: Interventions:  - Provide therapeutic environment   - Provide required programming   - Redirect inappropriate behaviors   Outcome: Progressing  Goal: Patient/Family participate in treatment and DC plans  Description: Interventions:  - Provide therapeutic environment  Outcome: Progressing  Goal: Patient/Family verbalizes awareness of resources  Outcome: Progressing     Problem: Depression  Goal: Treatment Goal: Demonstrate behavioral control of depressive symptoms, verbalize feelings of improved mood/affect, and adopt new coping skills prior to discharge  Outcome: Progressing  Goal: Verbalize thoughts and feelings  Description: Interventions:  - Assess and re-assess patient's level of risk   - Engage patient in 1:1 interactions, daily, for a minimum of 15 minutes   - Encourage patient to express feelings, fears, frustrations, hopes   Outcome: Progressing  Goal: Refrain from harming self  Description: Interventions:  - Monitor patient closely, per order   - Supervise medication ingestion, monitor effects and side effects   Outcome: Progressing  Goal: Refrain from isolation  Description: Interventions:  - Develop a trusting relationship   - Encourage socialization   Outcome: Progressing  Goal: Refrain from self-neglect  Outcome: Progressing  Goal: Attend and participate in unit activities, including therapeutic, recreational, and  educational groups  Description: Interventions:  - Provide therapeutic and educational activities daily, encourage attendance and participation, and document same in the medical record   Outcome: Progressing  Goal: Complete daily ADLs, including personal hygiene independently, as able  Description: Interventions:  - Observe, teach, and assist patient with ADLS  -  Monitor and promote a balance of rest/activity, with adequate nutrition and elimination   Outcome: Progressing     Problem: Anxiety  Goal: Anxiety is at manageable level  Description: Interventions:  - Assess and monitor patient's anxiety level.   - Monitor for signs and symptoms (heart palpitations, chest pain, shortness of breath, headaches, nausea, feeling jumpy, restlessness, irritable, apprehensive).   - Collaborate with interdisciplinary team and initiate plan and interventions as ordered.  - [place] patient to unit/surroundings  - Explain treatment plan  - Encourage participation in care  - Encourage verbalization of concerns/fears  - Identify coping mechanisms  - Assist in developing anxiety-reducing skills  - Administer/offer alternative therapies  - Limit or eliminate stimulants  Outcome: Progressing     Problem: Alteration in Orientation  Goal: Treatment Goal: Demonstrate a reduction of confusion and improved orientation to person, place, time and/or situation upon discharge, according to optimum baseline/ability  Outcome: Progressing  Goal: Interact with staff daily  Description: Interventions:  - Assess and re-assess patient's level of orientation  - Engage patient in 1 on 1 interactions, daily, for a minimum of 15 minutes   - Establish rapport/trust with patient   Outcome: Progressing  Goal: Express concerns related to confused thinking related to:  Description: Interventions:  - Encourage patient to express feelings, fears, frustrations, hopes  - Assign consistent caregivers   - [place]/re-orient patient as needed  - Allow comfort items, as  appropriate  - Provide visual cues, signs, etc.   Outcome: Progressing  Goal: Allow medical examinations, as recommended  Description: Interventions:  - Provide physical/neurological exams and/or referrals, per provider   Outcome: Progressing  Goal: Cooperate with recommended testing/procedures  Description: Interventions:  - Determine need for ancillary testing  - Observe for mental status changes  - Implement falls/precaution protocol   Outcome: Progressing  Goal: Attend and participate in unit activities, including therapeutic, recreational, and educational groups  Description: Interventions:  - Provide therapeutic and educational activities daily, encourage attendance and participation, and document same in the medical record   - Provide appropriate opportunities for reminiscence   - Provide a consistent daily routine   - Encourage family contact/visitation   Outcome: Progressing  Goal: Complete daily ADLs, including personal hygiene independently, as able  Description: Interventions:  - Observe, teach, and assist patient with ADLS  Outcome: Progressing     Problem: Electroconvulsive therapy (ECT)  Goal: Treatment Goal: Demonstrate a reduction of confusion and improved orientation to person, place, time and/or situation upon discharge, according to optimum baseline/ability  Outcome: Progressing  Goal: Verbalizes/displays adequate comfort level or baseline comfort level  Description: Interventions:  - Encourage patient to monitor pain and request assistance  - Assess pain using appropriate pain scale  - Administer analgesics based on type and severity of pain and evaluate response  - Implement non-pharmacological measures as appropriate and evaluate response  - Consider cultural and social influences on pain and pain management  - Notify physician/advanced practitioner if interventions unsuccessful or patient reports new pain  Outcome: Progressing  Goal: Achieves stable or improved neurological  status  Description: INTERVENTIONS  - Monitor and report changes in neurological status  - Monitor vital signs such as temperature, blood pressure, glucose, and any other labs ordered   - Initiate measures to prevent increased intracranial pressure  - Monitor for seizure activity and implement precautions if appropriate      Outcome: Progressing  Goal: Achieves maximal functionality and self care  Description: INTERVENTIONS  - Monitor swallowing and airway patency with patient fatigue and changes in neurological status  - Encourage and assist patient to increase activity and self care.   - Encourage visually impaired, hearing impaired and aphasic patients to use assistive/communication devices  Outcome: Progressing  Goal: Maintain or return mobility to safest level of function  Description: INTERVENTIONS:  - Assess patient's ability to carry out ADLs; assess patient's baseline for ADL function and identify physical deficits which impact ability to perform ADLs (bathing, care of mouth/teeth, toileting, grooming, dressing, etc.)  - Assess/evaluate cause of self-care deficits   - Assess range of motion  - Assess patient's mobility  - Assess patient's need for assistive devices and provide as appropriate  - Encourage maximum independence but intervene and supervise when necessary  - Involve family in performance of ADLs  - Assess for home care needs following discharge   - Consider OT consult to assist with ADL evaluation and planning for discharge  - Provide patient education as appropriate  Outcome: Progressing  Goal: Absence of urinary retention  Description: INTERVENTIONS:  - Assess patient’s ability to void and empty bladder  - Monitor I/O  - Bladder scan as needed  - Discuss with physician/AP medications to alleviate retention as needed  - Discuss catheterization for long term situations as appropriate  Outcome: Progressing  Goal: Minimal or absence of nausea and/or vomiting  Description: INTERVENTIONS:  -  Administer IV fluids if ordered to ensure adequate hydration  - Maintain NPO status until nausea and vomiting are resolved  - Nasogastric tube if ordered  - Administer ordered antiemetic medications as needed  - Provide nonpharmacologic comfort measures as appropriate  - Advance diet as tolerated, if ordered  - Consider nutrition services referral to assist patient with adequate nutrition and appropriate food choices  Outcome: Progressing  Goal: Maintains adequate nutritional intake  Description: INTERVENTIONS:  - Monitor percentage of each meal consumed  - Identify factors contributing to decreased intake, treat as appropriate  - Assist with meals as needed  - Monitor I&O, weight, and lab values if indicated  - Obtain nutrition services referral as needed  Outcome: Progressing

## 2024-09-09 NOTE — NURSING NOTE
Patient visible and cooperative all evening. Behaviors controlled and appropriate as well. Offered no complaints. Medication compliant. Uneventful shift noted. No prn medication requested or required

## 2024-09-10 PROCEDURE — 99232 SBSQ HOSP IP/OBS MODERATE 35: CPT | Performed by: PSYCHIATRY & NEUROLOGY

## 2024-09-10 RX ADMIN — NICOTINE POLACRILEX 2 MG: 2 GUM, CHEWING ORAL at 20:59

## 2024-09-10 RX ADMIN — NICOTINE POLACRILEX 2 MG: 2 GUM, CHEWING ORAL at 08:53

## 2024-09-10 RX ADMIN — GLYCOPYRROLATE 2 MG: 1 TABLET ORAL at 21:01

## 2024-09-10 RX ADMIN — FAMOTIDINE 20 MG: 20 TABLET ORAL at 08:39

## 2024-09-10 RX ADMIN — CYANOCOBALAMIN TAB 1000 MCG 1000 MCG: 1000 TAB at 08:39

## 2024-09-10 RX ADMIN — PANTOPRAZOLE SODIUM 20 MG: 20 TABLET, DELAYED RELEASE ORAL at 08:38

## 2024-09-10 RX ADMIN — FAMOTIDINE 20 MG: 20 TABLET ORAL at 17:03

## 2024-09-10 RX ADMIN — HYDROCHLOROTHIAZIDE 12.5 MG: 12.5 TABLET ORAL at 08:38

## 2024-09-10 RX ADMIN — NICOTINE POLACRILEX 2 MG: 2 GUM, CHEWING ORAL at 17:03

## 2024-09-10 RX ADMIN — GLYCOPYRROLATE 1 MG: 1 TABLET ORAL at 15:27

## 2024-09-10 RX ADMIN — ARIPIPRAZOLE 15 MG: 15 TABLET ORAL at 08:39

## 2024-09-10 RX ADMIN — LORATADINE 10 MG: 10 TABLET ORAL at 08:39

## 2024-09-10 RX ADMIN — ESCITALOPRAM OXALATE 20 MG: 10 TABLET, FILM COATED ORAL at 08:38

## 2024-09-10 RX ADMIN — PROPRANOLOL HYDROCHLORIDE 10 MG: 10 TABLET ORAL at 08:39

## 2024-09-10 RX ADMIN — SENNOSIDES AND DOCUSATE SODIUM 2 TABLET: 8.6; 5 TABLET ORAL at 21:00

## 2024-09-10 RX ADMIN — PROPRANOLOL HYDROCHLORIDE 10 MG: 10 TABLET ORAL at 20:58

## 2024-09-10 RX ADMIN — AMLODIPINE BESYLATE 5 MG: 5 TABLET ORAL at 08:39

## 2024-09-10 RX ADMIN — CLOZAPINE 200 MG: 200 TABLET ORAL at 17:03

## 2024-09-10 RX ADMIN — CLOZAPINE 300 MG: 200 TABLET ORAL at 21:00

## 2024-09-10 RX ADMIN — METFORMIN HYDROCHLORIDE 500 MG: 500 TABLET, FILM COATED ORAL at 08:38

## 2024-09-10 RX ADMIN — GLYCOPYRROLATE 1 MG: 1 TABLET ORAL at 08:38

## 2024-09-10 RX ADMIN — METFORMIN HYDROCHLORIDE 500 MG: 500 TABLET, FILM COATED ORAL at 17:03

## 2024-09-10 NOTE — PLAN OF CARE
Problem: Alteration in Thoughts and Perception  Goal: Treatment Goal: Gain control of psychotic behaviors/thinking, reduce/eliminate presenting symptoms and demonstrate improved reality functioning upon discharge  Outcome: Progressing  Goal: Verbalize thoughts and feelings  Description: Interventions:  - Promote a nonjudgmental and trusting relationship with the patient through active listening and therapeutic communication  - Assess patient's level of functioning, behavior and potential for risk  - Engage patient in 1 on 1 interactions  - Encourage patient to express fears, feelings, frustrations, and discuss symptoms    - [place] patient to reality, help patient recognize reality-based thinking   - Administer medications as ordered and assess for potential side effects  - Provide the patient education related to the signs and symptoms of the illness and desired effects of prescribed medications  Outcome: Progressing  Goal: Refrain from acting on delusional thinking/internal stimuli  Description: Interventions:  - Monitor patient closely, per order   - Utilize least restrictive measures   - Set reasonable limits, give positive feedback for acceptable   - Administer medications as ordered and monitor of potential side effects  Outcome: Progressing  Goal: Agree to be compliant with medication regime, as prescribed and report medication side effects  Description: Interventions:  - Offer appropriate PRN medication and supervise ingestion; conduct AIMS, as needed   Outcome: Progressing  Goal: Recognize dysfunctional thoughts, communicate reality-based thoughts at the time of discharge  Description: Interventions:  - Provide medication and psycho-education to assist patient in compliance and developing insight into his/her illness   Outcome: Progressing     Problem: Ineffective Coping  Goal: Identifies ineffective coping skills  Outcome: Progressing  Goal: Participates in unit activities  Description: Interventions:  -  Provide therapeutic environment   - Provide required programming   - Redirect inappropriate behaviors   Outcome: Progressing  Goal: Patient/Family verbalizes awareness of resources  Outcome: Progressing     Problem: Depression  Goal: Treatment Goal: Demonstrate behavioral control of depressive symptoms, verbalize feelings of improved mood/affect, and adopt new coping skills prior to discharge  Outcome: Progressing  Goal: Refrain from harming self  Description: Interventions:  - Monitor patient closely, per order   - Supervise medication ingestion, monitor effects and side effects   Outcome: Progressing  Goal: Refrain from isolation  Description: Interventions:  - Develop a trusting relationship   - Encourage socialization   Outcome: Progressing  Goal: Refrain from self-neglect  Outcome: Progressing  Goal: Attend and participate in unit activities, including therapeutic, recreational, and educational groups  Description: Interventions:  - Provide therapeutic and educational activities daily, encourage attendance and participation, and document same in the medical record   Outcome: Progressing     Problem: Anxiety  Goal: Anxiety is at manageable level  Description: Interventions:  - Assess and monitor patient's anxiety level.   - Monitor for signs and symptoms (heart palpitations, chest pain, shortness of breath, headaches, nausea, feeling jumpy, restlessness, irritable, apprehensive).   - Collaborate with interdisciplinary team and initiate plan and interventions as ordered.  - [place] patient to unit/surroundings  - Explain treatment plan  - Encourage participation in care  - Encourage verbalization of concerns/fears  - Identify coping mechanisms  - Assist in developing anxiety-reducing skills  - Administer/offer alternative therapies  - Limit or eliminate stimulants  Outcome: Progressing     Problem: Alteration in Orientation  Goal: Treatment Goal: Demonstrate a reduction of confusion and improved orientation to  person, place, time and/or situation upon discharge, according to optimum baseline/ability  Outcome: Progressing  Goal: Interact with staff daily  Description: Interventions:  - Assess and re-assess patient's level of orientation  - Engage patient in 1 on 1 interactions, daily, for a minimum of 15 minutes   - Establish rapport/trust with patient   Outcome: Progressing     Problem: DISCHARGE PLANNING - CARE MANAGEMENT  Goal: Discharge to post-acute care or home with appropriate resources  Description: INTERVENTIONS:  - Conduct assessment to determine patient/family and health care team treatment goals, and need for post-acute services based on payer coverage, community resources, and patient preferences, and barriers to discharge  - Address psychosocial, clinical, and financial barriers to discharge as identified in assessment in conjunction with the patient/family and health care team  - Arrange appropriate level of post-acute services according to patient's   needs and preference and payer coverage in collaboration with the physician and health care team  - Communicate with and update the patient/family, physician, and health care team regarding progress on the discharge plan  - Arrange appropriate transportation to post-acute venues  Outcome: Progressing     Problem: Alteration in Thoughts and Perception  Goal: Complete daily ADLs, including personal hygiene independently, as able  Description: Interventions:  - Observe, teach, and assist patient with ADLS  - Monitor and promote a balance of rest/activity, with adequate nutrition and elimination   Outcome: Not Progressing     Problem: Depression  Goal: Complete daily ADLs, including personal hygiene independently, as able  Description: Interventions:  - Observe, teach, and assist patient with ADLS  -  Monitor and promote a balance of rest/activity, with adequate nutrition and elimination   Outcome: Not Progressing

## 2024-09-10 NOTE — PROGRESS NOTES
09/10/24 0736   Team Meeting   Meeting Type Daily Rounds   Team Members Present   Team Members Present Physician;Nurse;;Other (Discipline and Name)   Patient/Family Present   Patient Present No   Patient's Family Present No     In attendance:  Dr. Alex Thomas, MD Dr. Jordan Holter, DO Guy Taylor, RN  Luisana Alvarado, [facility]  Carla Lux, [facility]  Irais Mcdowell, M.S.    Groups: 3/9    Pt exhibits no changes. Pt cooperative, reports ongoing AH. Pt social and appropriate with select female peer. Pt's last ECT will be Friday 9/13.

## 2024-09-10 NOTE — PROGRESS NOTES
"Psychiatry Progress Note [facility]    Alberto Berumen 28 y.o. male MRN: [id number]  Unit/Bed#: [facility] 108-02 Encounter: [id number]  Code Status: Level 1 - Full Code    PCP: Joce Juan MD    Date of Admission:  3/29/2024 2008   Date of Service:  09/10/24    Patient Active Problem List   Diagnosis    GERD (gastroesophageal reflux disease)    Medical clearance for psychiatric admission    Schizoaffective disorder, bipolar type (HCC)    Tobacco abuse    T wave inversion in EKG    Syringoma    Chronic idiopathic constipation    Vitamin B 12 deficiency    Vitamin D deficiency    Confluent and reticulate papillomatosis    Class 2 obesity in adult    Primary hypertension    Elevated hemoglobin A1c    Bilateral lower extremity edema         Review of systems: No symptoms reported  Psychiatric Diagnosis: Schizoaffective Disorder, Bipolar Type     Assessment  Overall Status: Reported that overall he is feeling rested and had a good day yesterday. He reported feeling \"stable\" but continues to complain of depressive symptoms and rated his depression a 7/10 today. Continues to hear threatening voices at all times throughout the day.   Certification Statement: The patient will continue to require additional inpatient hospital stay due to continued psychotic and severe symptoms of depression.      Assessment & Plan  Schizoaffective disorder, bipolar type (HCC)  Schizoaffective Disorder, Bipolar Type  Continues to have depressive symptoms as well as frequent, threatening auditory hallucinations. He continues to have paranoid delusions and a blunted affect.   Appears to be making small improvements with his symptoms.    Continue aripiprazole 15 mg tablet daily  Continue clozapine 200 mg tablet before dinner  Continue clozapine 200 mg tablet HS  Continue cyanocobalamin 1,000 mg tablet daily  Continue escitalopram 20 mg tablet  Continue senna-docusate sodium two 50 mg tablet HS  Tobacco abuse     "       Medications:   Abilify 15 mg po daily, clozaril 200 mg po at dinner and 300 mg po HS, B12 1000 mcg po daily, lexapro 20 mg po daily, glycopyrrolate 1 mg BID po and 2 mg HS, propranolol 10 mg po BID, senna docusate sodium 2 tablet HS.   Side effects from treatment: Continues to drool at night and forgets to ask for drops.   Medication changes   None   Medication education  Risks side effects benefits and precautions of medications discussed with patient and he did verbalize an understanding about risks for metabolic syndrome from being on neuroleptics and is form tardive dyskinesia etc.  All medications reviewed and I recommend that they be continued for symptom management   Understanding of medications: Verbalized    Justification for dual anti-psychotics: Inability to control psychotic symptoms.     Non-pharmacological treatments  Continue with individual, group, milieu and occupational therapy using recovery principles and psycho-education about accepting illness and the need for treatment.  Last session of ECT on 9/13     Safety  Safety and communication plan established to target dynamic risk factors discussed above.    Discharge Plan   Group home vs aunt's house    Interval Progress   Patient reported that he slept well last night and believes he slept for approximately 8 hours. He reported feeling rested but also discussed waking up to drool on his pillow and that he continues to forget to ask for drops before bed. He denied any other side effects from medications.He denied any active feelings of suicidal thoughts, intents, or plans, but states that he continues to hear threatening voices. He stated he would rate his depression at about a 7/10, but that it has improved since arrival on the unit and stated that talking with her peers and family has helped improve those symptoms. He reported some twitching throughout his body, but no signs was observed during the interview.   Acceptance by patient: Yes     Hopefulness in recovery: Hoping to live with aunt   Involved in reintegration process: Aunt   Trusting in relationship with psychiatrist: Yes  Sleep: Good, approximately 8 hours  Appetite: Good, eating 3 meals per day  Compliance with Medications: Good  Group attendance: 3/9  Significant events: Patient reported he feels safe in the hospital but that the threatening voices continue to be frequent.      Mental Status Exam  Appearance: marginal hygiene, dressed in hospital attire, looks stated age, overweight, good eye contact with occasional distant gaze  Behavior: cooperative, calm  Speech: normal rate, normal volume, normal pitch, scant  Mood: depressed  Affect: depressed, blunted, mood-congruent  Thought Process: organized, linear  Thought Content: paranoid ideation, stating that he feels the voices in his head are out to get him. He denies any active suicidal or homicidal thoughts, plans, or intents. He denies any obsessions, compulsions, or distorted body perceptions. He denies any other experiences that would be categorized as grandiose, somatic, or bizarre.  Perceptual Disturbances: no visual hallucinations, auditory hallucinations telling him they cannot wait for him to get out of the hospital so they can shoot him and his family. He reported that sometimes the voices sound like his ex girlfriend and other times they sound like a stranger. He says they still occur all the time but he does not appear to be responding to internal stimuli.  Hx Risk Factors: chronic psychiatric problems, chronic depressive symptoms, chronic psychotic symptoms  Sensorium: alert and oriented to person, place, time and situation  Cognition: recent and remote memory grossly intact  Consciousness: alert and awake  Attention: attention span and concentration are age appropriate  Intellect: appears to be of average intelligence  Insight: limited  Judgement: impaired  Motor Activity: no abnormal movements     Vitals  Temp:  [96.8 °F  (36 °C)-97.8 °F (36.6 °C)] 96.8 °F (36 °C)  HR:  [] 79  Resp:  [16-18] 16  BP: (103-132)/(62-77) 103/62  SpO2:  [98 %-100 %] 98 %    Intake/Output Summary (Last 24 hours) at 9/10/2024 1350  Last data filed at 9/9/2024 1937  Gross per 24 hour   Intake 0 ml   Output --   Net 0 ml       Lab Results: All Labs For Current Hospital Admission Reviewed    Current Facility-Administered Medications   Medication Dose Route Frequency Provider Last Rate    acetaminophen  650 mg Oral Q4H PRN Jordan C Holter,       acetaminophen  650 mg Oral Q6H PRN HOLLI Lion      aluminum-magnesium hydroxide-simethicone  30 mL Oral Q4H PRN Jordan C Holter,       amLODIPine  5 mg Oral Daily HOLLI Lion      ARIPiprazole  15 mg Oral Daily Bora Rosario MD      Artificial Tears  1 drop Both Eyes Q3H PRN Jordan C Holter,       atropine  1 drop Sublingual Daily PRN HOLLI Lion      haloperidol lactate  2.5 mg Intramuscular Q4H PRN Max 4/day STEVE LionNP      And    LORazepam  1 mg Intramuscular Q4H PRN Max 4/day HOLLI Lion      And    benztropine  0.5 mg Intramuscular Q4H PRN Max 4/day STEVE LionNP      benztropine  1 mg Intramuscular Q4H PRN Max 6/day Jordan C Holter,       haloperidol lactate  5 mg Intramuscular Q4H PRN Max 4/day STEVE LionNP      And    LORazepam  2 mg Intramuscular Q4H PRN Max 4/day STEVE LionNP      And    benztropine  1 mg Intramuscular Q4H PRN Max 4/day HOLLI Lion      benztropine  1 mg Oral Q4H PRN Max 6/day HOLLI Lion      benztropine  1 mg Oral Q4H PRN Max 6/day Jordan C Holter,       bisacodyl  10 mg Rectal Daily PRN HOLLI Lion      calcium carbonate  500 mg Oral BID PRN HOLLI Monge      cloZAPine  200 mg Oral Before Dinner Bora Rosario MD      cloZAPine  300 mg Oral HS Bora Rosario MD      cyanocobalamin  1,000 mcg Oral Daily HOLLI Galvan      hydrOXYzine HCL  50 mg Oral Q6H PRN Max 4/day Ion  AALIYAH Dupontter, DO      Or    diphenhydrAMINE  50 mg Intramuscular Q6H PRN Jordan C Holter, DO      hydrOXYzine HCL  50 mg Oral Q6H PRN Max 4/day HOLLI Lion      Or    diphenhydrAMINE  50 mg Intramuscular Q6H PRN HOLLI Lion      diphenhydrAMINE-zinc acetate   Topical BID PRN HOLLI Lion      escitalopram  20 mg Oral Daily HOLLI Lion      famotidine  20 mg Oral BID Eileen CherryHOLLI Jimenez      fluticasone  1 spray Each Nare Daily Brina Guillen MD      glycopyrrolate  1 mg Oral BID (AM & Afternoon) Bora Rosario MD      glycopyrrolate  2 mg Oral HS Bora Rosario MD      haloperidol  1 mg Oral Q6H PRN HOLLI Lion      haloperidol  2.5 mg Oral Q4H PRN Max 4/day HOLLI Lion      haloperidol  5 mg Oral Q4H PRN Max 4/day HOLLI Lion      hydroCHLOROthiazide  12.5 mg Oral Daily HOLLI Galvan      hydrocortisone   Topical 4x Daily PRN HOLLI Lion      hydrOXYzine HCL  100 mg Oral Q6H PRN Max 4/day Jordan C Holter, DO      Or    LORazepam  2 mg Intramuscular Q6H PRN Jordan C Holter, DO      hydrOXYzine HCL  100 mg Oral Q6H PRN Max 4/day HOLLI Lion      Or    LORazepam  2 mg Intramuscular Q6H PRN HOLLI Lion      hydrOXYzine HCL  25 mg Oral Q6H PRN Max 4/day Jordan C Holter, DO      ibuprofen  600 mg Oral Q8H PRN HOLLI Lion      lactated ringers  50 mL/hr Intravenous Continuous Antwan Dong MD      loratadine  10 mg Oral Daily Brina Guillen MD      melatonin  3 mg Oral HS PRN Bora Rosario MD      metFORMIN  500 mg Oral BID With Meals HOLLI Galvan      methocarbamol  500 mg Oral Q6H PRN HOLLI Lion      nicotine polacrilex  2 mg Oral Q4H PRN Bora Rosario MD      OLANZapine  5 mg Oral Q4H PRN Max 3/day Jordan C Holter, DO      Or    OLANZapine  2.5 mg Intramuscular Q4H PRN Max 3/day Jordan C Holter, DO      OLANZapine  5 mg Oral Q3H PRN Max 3/day Ion FISCHER Holter, DO      Or    OLANZapine  5 mg Intramuscular Q3H  PRN Max 3/day Jordan C Holter, DO      OLANZapine  2.5 mg Oral Q4H PRN Max 6/day Jordan C Holter, DO      ondansetron  4 mg Oral Q6H PRN HOLLI Lion      pantoprazole  20 mg Oral QAM Ion Dupontter, DO      polyethylene glycol  17 g Oral Daily PRN HOLLI Ghotra      propranolol  10 mg Oral Q12H [facility] HOLLI Lion      senna-docusate sodium  1 tablet Oral Daily PRN Jordan C Holter, DO      senna-docusate sodium  2 tablet Oral HS Melvina Ambron, DO      traZODone  50 mg Oral HS PRN HOLLI Lion      white petrolatum-mineral oil   Topical TID PRN HOLLI Lion         Counseling / Coordination of Care: Total floor / unit time spent today 15 minutes. Greater than 50% of total time was spent with the patient and / or family counseling and / or somewhat receptive to supportive listening and teaching positive coping skills to deal with symptom mangement.     Patient's Rights, confidentiality and exceptions to confidentiality, use of automated medical record, Behavioral Health Services staff access to medical record, and consent to treatment reviewed.    This note has been dictated and hence there may be problems with punctuation, spelling and formatting and if anyone has any concerns please address them to the writer.  This note is not shared with patient due to potential for making patient's condition worse by knowing the content of the note.    Eveline WALLACE

## 2024-09-10 NOTE — PROGRESS NOTES
"Psychiatry Progress Note [facility]    Alberto Berumen 28 y.o. male MRN: [id number]  Unit/Bed#: [facility] 108-02 Encounter: [id number]  Code Status: Level 1 - Full Code    PCP: Joce Juan MD    Date of Admission:  3/29/2024 2008   Date of Service:  09/10/24    Patient Active Problem List   Diagnosis    GERD (gastroesophageal reflux disease)    Medical clearance for psychiatric admission    Schizoaffective disorder, bipolar type (HCC)    Tobacco abuse    T wave inversion in EKG    Syringoma    Chronic idiopathic constipation    Vitamin B 12 deficiency    Vitamin D deficiency    Confluent and reticulate papillomatosis    Class 2 obesity in adult    Primary hypertension    Elevated hemoglobin A1c    Bilateral lower extremity edema         Review of systems: No symptoms reported  Psychiatric Diagnosis: Schizoaffective Disorder, Bipolar Type     Assessment  Overall Status: Reported that overall he is feeling rested and had a good day yesterday. He reported feeling \"stable\" but continues to complain of depressive symptoms and rated his depression a 7/10 today. Continues to hear threatening voices at all times throughout the day.   Certification Statement: The patient will continue to require additional inpatient hospital stay due to continued psychotic and severe symptoms of depression.      Assessment & Plan  Schizoaffective disorder, bipolar type (HCC)  Schizoaffective Disorder, Bipolar Type  Continues to have depressive symptoms as well as frequent, threatening auditory hallucinations. He continues to have paranoid delusions and a blunted affect.   Appears to be making small improvements with his symptoms.    Continue aripiprazole 15 mg tablet daily  Continue clozapine 200 mg tablet before dinner  Continue clozapine 200 mg tablet HS  Continue cyanocobalamin 1,000 mg tablet daily  Continue escitalopram 20 mg tablet  Continue senna-docusate sodium two 50 mg tablet HS  Tobacco abuse     "       Medications:   Abilify 15 mg po daily, clozaril 200 mg po at dinner and 300 mg po HS, B12 1000 mcg po daily, lexapro 20 mg po daily, glycopyrrolate 1 mg BID po and 2 mg HS, propranolol 10 mg po BID, senna docusate sodium 2 tablet HS.   Side effects from treatment: Continues to drool at night and forgets to ask for drops.   Medication changes   None   Medication education  Risks side effects benefits and precautions of medications discussed with patient and he did verbalize an understanding about risks for metabolic syndrome from being on neuroleptics and is form tardive dyskinesia etc.  All medications reviewed and I recommend that they be continued for symptom management   Understanding of medications: Verbalized    Justification for dual anti-psychotics: Inability to control psychotic symptoms.     Non-pharmacological treatments  Continue with individual, group, milieu and occupational therapy using recovery principles and psycho-education about accepting illness and the need for treatment.  Last session of ECT on 9/13     Safety  Safety and communication plan established to target dynamic risk factors discussed above.    Discharge Plan   Group home vs aunt's house    Interval Progress   Patient reported that he slept well last night and believes he slept for approximately 8 hours. He reported feeling rested but also discussed waking up to drool on his pillow and that he continues to forget to ask for drops before bed. He denied any other side effects from medications.He denied any active feelings of suicidal thoughts, intents, or plans, but states that he continues to hear threatening voices. He stated he would rate his depression at about a 7/10, but that it has improved since arrival on the unit and stated that talking with her peers and family has helped improve those symptoms. He reported some twitching throughout his body, but no signs was observed during the interview.   Acceptance by patient: Yes     Hopefulness in recovery: Hoping to live with aunt   Involved in reintegration process: Aunt   Trusting in relationship with psychiatrist: Yes  Sleep: Good, approximately 8 hours  Appetite: Good, eating 3 meals per day  Compliance with Medications: Good  Group attendance: 3/9  Significant events: Patient reported he feels safe in the hospital but that the threatening voices continue to be frequent.      Mental Status Exam  Appearance: marginal hygiene, dressed in hospital attire, looks stated age, overweight, good eye contact with occasional distant gaze  Behavior: cooperative, calm  Speech: normal rate, normal volume, normal pitch, scant  Mood: depressed  Affect: depressed, blunted, mood-congruent  Thought Process: organized, linear  Thought Content: paranoid ideation, stating that he feels the voices in his head are out to get him. He denies any active suicidal or homicidal thoughts, plans, or intents. He denies any obsessions, compulsions, or distorted body perceptions. He denies any other experiences that would be categorized as grandiose, somatic, or bizarre.  Perceptual Disturbances: no visual hallucinations, auditory hallucinations telling him they cannot wait for him to get out of the hospital so they can shoot him and his family. He reported that sometimes the voices sound like his ex girlfriend and other times they sound like a stranger. He says they still occur all the time but he does not appear to be responding to internal stimuli.  Hx Risk Factors: chronic psychiatric problems, chronic depressive symptoms, chronic psychotic symptoms  Sensorium: alert and oriented to person, place, time and situation  Cognition: recent and remote memory grossly intact  Consciousness: alert and awake  Attention: attention span and concentration are age appropriate  Intellect: appears to be of average intelligence  Insight: limited  Judgement: impaired  Motor Activity: no abnormal movements     Vitals  Temp:  [96.8 °F  (36 °C)-98 °F (36.7 °C)] 97.6 °F (36.4 °C)  HR:  [79-97] 97  Resp:  [16] 16  BP: (103-133)/(62-78) 133/78  SpO2:  [98 %-99 %] 99 %    Intake/Output Summary (Last 24 hours) at 9/10/2024 1711  Last data filed at 9/9/2024 1937  Gross per 24 hour   Intake 0 ml   Output --   Net 0 ml       Lab Results: All Labs For Current Hospital Admission Reviewed    Current Facility-Administered Medications   Medication Dose Route Frequency Provider Last Rate    acetaminophen  650 mg Oral Q4H PRN Jordan C Holter, DO      acetaminophen  650 mg Oral Q6H PRN HOLLI Lion      aluminum-magnesium hydroxide-simethicone  30 mL Oral Q4H PRN Jordan C Holter,       amLODIPine  5 mg Oral Daily HOLLI Lion      ARIPiprazole  15 mg Oral Daily Bora Rosario MD      Artificial Tears  1 drop Both Eyes Q3H PRN Jordan C Holter, DO      atropine  1 drop Sublingual Daily PRN HOLLI Lion      haloperidol lactate  2.5 mg Intramuscular Q4H PRN Max 4/day STEVE LionNP      And    LORazepam  1 mg Intramuscular Q4H PRN Max 4/day HOLLI Lion      And    benztropine  0.5 mg Intramuscular Q4H PRN Max 4/day HOLLI Lion      benztropine  1 mg Intramuscular Q4H PRN Max 6/day Jordan C Holter, DO      haloperidol lactate  5 mg Intramuscular Q4H PRN Max 4/day STEVE LionNP      And    LORazepam  2 mg Intramuscular Q4H PRN Max 4/day STEVE LionNP      And    benztropine  1 mg Intramuscular Q4H PRN Max 4/day HOLLI Lion      benztropine  1 mg Oral Q4H PRN Max 6/day HOLLI Lion      benztropine  1 mg Oral Q4H PRN Max 6/day Jordan C Holter,       bisacodyl  10 mg Rectal Daily PRN HOLLI Lion      calcium carbonate  500 mg Oral BID PRN HOLLI Monge      cloZAPine  200 mg Oral Before Dinner Bora Rosario MD      cloZAPine  300 mg Oral HS Bora Rosario MD      cyanocobalamin  1,000 mcg Oral Daily HOLLI Galvan      hydrOXYzine HCL  50 mg Oral Q6H PRN Max 4/day Ion FISCHER  Holter, DO      Or    diphenhydrAMINE  50 mg Intramuscular Q6H PRN Jordan C Holter, DO      hydrOXYzine HCL  50 mg Oral Q6H PRN Max 4/day HOLLI Lion      Or    diphenhydrAMINE  50 mg Intramuscular Q6H PRN HOLLI Lion      diphenhydrAMINE-zinc acetate   Topical BID PRN HOLLI Lion      escitalopram  20 mg Oral Daily HOLLI Lion      famotidine  20 mg Oral BID Eileen CherryHOLLI Jimenez      fluticasone  1 spray Each Nare Daily Brina Guillen MD      glycopyrrolate  1 mg Oral BID (AM & Afternoon) Bora Rosario MD      glycopyrrolate  2 mg Oral HS Bora Rosario MD      haloperidol  1 mg Oral Q6H PRN HOLLI Lion      haloperidol  2.5 mg Oral Q4H PRN Max 4/day HOLLI Lion      haloperidol  5 mg Oral Q4H PRN Max 4/day HOLLI Lion      hydroCHLOROthiazide  12.5 mg Oral Daily HOLLI Galvan      hydrocortisone   Topical 4x Daily PRN HOLLI Lion      hydrOXYzine HCL  100 mg Oral Q6H PRN Max 4/day Jordan C Holter, DO      Or    LORazepam  2 mg Intramuscular Q6H PRN Jordan C Holter, DO      hydrOXYzine HCL  100 mg Oral Q6H PRN Max 4/day HOLLI Lion      Or    LORazepam  2 mg Intramuscular Q6H PRN HOLLI Lion      hydrOXYzine HCL  25 mg Oral Q6H PRN Max 4/day Jordan C Holter, DO      ibuprofen  600 mg Oral Q8H PRN HOLLI Lion      lactated ringers  50 mL/hr Intravenous Continuous Antwan Dong MD      loratadine  10 mg Oral Daily Brina Guillen MD      melatonin  3 mg Oral HS PRN Bora Rosario MD      metFORMIN  500 mg Oral BID With Meals HOLLI Galvan      methocarbamol  500 mg Oral Q6H PRN HOLLI Lion      nicotine polacrilex  2 mg Oral Q4H PRN Bora Rosario MD      OLANZapine  5 mg Oral Q4H PRN Max 3/day Jordan C Holter, DO      Or    OLANZapine  2.5 mg Intramuscular Q4H PRN Max 3/day Jordan C Holter, DO      OLANZapine  5 mg Oral Q3H PRN Max 3/day Ion FISCHER Holter, DO      Or    OLANZapine  5 mg Intramuscular Q3H  PRN Max 3/day Jordan C Holter, DO      OLANZapine  2.5 mg Oral Q4H PRN Max 6/day Jordan C Holter, DO      ondansetron  4 mg Oral Q6H PRN HOLLI Lion      pantoprazole  20 mg Oral QAM Ion Dupontter, DO      polyethylene glycol  17 g Oral Daily PRN HOLLI Ghotra      propranolol  10 mg Oral Q12H [facility] HOLLI Lion      senna-docusate sodium  1 tablet Oral Daily PRN Jordan C Holter, DO      senna-docusate sodium  2 tablet Oral HS Melvina Ambron, DO      traZODone  50 mg Oral HS PRN HOLLI Lion      white petrolatum-mineral oil   Topical TID PRN HOLLI Lion         Counseling / Coordination of Care: Total floor / unit time spent today 15 minutes. Greater than 50% of total time was spent with the patient and / or family counseling and / or somewhat receptive to supportive listening and teaching positive coping skills to deal with symptom mangement.     Patient's Rights, confidentiality and exceptions to confidentiality, use of automated medical record, Behavioral Health Services staff access to medical record, and consent to treatment reviewed.    This note has been dictated and hence there may be problems with punctuation, spelling and formatting and if anyone has any concerns please address them to the writer.  This note is not shared with patient due to potential for making patient's condition worse by knowing the content of the note.    Eveline WALLACE

## 2024-09-10 NOTE — NURSING NOTE
"Alberto was calm and cooperative during assessment. He reports mild depression related to his AH stating it would \"kill me and my family.\" He denies SI, SH, HI, and VH. Alberto states that his goal today is to \"be patient,\" and stated he would use coping skills of talking to peers and walking to accomplish this. He is medication compliant and requested PRN nicotine gum. He denies new concerns at this time.   "

## 2024-09-10 NOTE — SOCIAL WORK
SW checked in with pt. Pt reports feeling ok today. Pt requested virtual visit with brother-in-law. Scheduled for 6pm tonight on [employer] [employer]. Pt reports no major concerns other than AH remain unchanged.

## 2024-09-10 NOTE — NURSING NOTE
Visible on the unit. Social with female [place] Affect flat. Quiet. Appetite excellent. Medication compliant. Admits having auditory hallucinations of voices telling him they are going to harm him and his family. Denies suicidal ideations. Group attendance today 3/9. Unkept appearance.

## 2024-09-10 NOTE — ASSESSMENT & PLAN NOTE
Schizoaffective Disorder, Bipolar Type  Continues to have depressive symptoms as well as frequent, threatening auditory hallucinations. He continues to have paranoid delusions and a blunted affect.   Appears to be making small improvements with his symptoms.    Continue aripiprazole 15 mg tablet daily  Continue clozapine 200 mg tablet before dinner  Continue clozapine 200 mg tablet HS  Continue cyanocobalamin 1,000 mg tablet daily  Continue escitalopram 20 mg tablet  Continue senna-docusate sodium two 50 mg tablet HS

## 2024-09-11 PROCEDURE — 99232 SBSQ HOSP IP/OBS MODERATE 35: CPT | Performed by: PSYCHIATRY & NEUROLOGY

## 2024-09-11 RX ORDER — ATROPINE SULFATE 10 MG/ML
1 SOLUTION/ DROPS OPHTHALMIC
Status: DISCONTINUED | OUTPATIENT
Start: 2024-09-11 | End: 2025-01-13

## 2024-09-11 RX ADMIN — SENNOSIDES AND DOCUSATE SODIUM 2 TABLET: 8.6; 5 TABLET ORAL at 21:07

## 2024-09-11 RX ADMIN — CLOZAPINE 200 MG: 200 TABLET ORAL at 16:42

## 2024-09-11 RX ADMIN — NICOTINE POLACRILEX 2 MG: 2 GUM, CHEWING ORAL at 12:27

## 2024-09-11 RX ADMIN — FAMOTIDINE 20 MG: 20 TABLET ORAL at 17:04

## 2024-09-11 RX ADMIN — CYANOCOBALAMIN TAB 1000 MCG 1000 MCG: 1000 TAB at 08:33

## 2024-09-11 RX ADMIN — NICOTINE POLACRILEX 2 MG: 2 GUM, CHEWING ORAL at 21:07

## 2024-09-11 RX ADMIN — PROPRANOLOL HYDROCHLORIDE 10 MG: 10 TABLET ORAL at 21:08

## 2024-09-11 RX ADMIN — PROPRANOLOL HYDROCHLORIDE 10 MG: 10 TABLET ORAL at 08:35

## 2024-09-11 RX ADMIN — LORATADINE 10 MG: 10 TABLET ORAL at 08:33

## 2024-09-11 RX ADMIN — ATROPINE SULFATE 1 DROP: 10 SOLUTION/ DROPS OPHTHALMIC at 21:09

## 2024-09-11 RX ADMIN — PANTOPRAZOLE SODIUM 20 MG: 20 TABLET, DELAYED RELEASE ORAL at 09:02

## 2024-09-11 RX ADMIN — NICOTINE POLACRILEX 2 MG: 2 GUM, CHEWING ORAL at 08:39

## 2024-09-11 RX ADMIN — FAMOTIDINE 20 MG: 20 TABLET ORAL at 08:34

## 2024-09-11 RX ADMIN — GLYCOPYRROLATE 1 MG: 1 TABLET ORAL at 08:34

## 2024-09-11 RX ADMIN — GLYCOPYRROLATE 2 MG: 1 TABLET ORAL at 21:07

## 2024-09-11 RX ADMIN — ARIPIPRAZOLE 15 MG: 15 TABLET ORAL at 08:35

## 2024-09-11 RX ADMIN — NICOTINE POLACRILEX 2 MG: 2 GUM, CHEWING ORAL at 16:42

## 2024-09-11 RX ADMIN — METFORMIN HYDROCHLORIDE 500 MG: 500 TABLET, FILM COATED ORAL at 16:42

## 2024-09-11 RX ADMIN — METFORMIN HYDROCHLORIDE 500 MG: 500 TABLET, FILM COATED ORAL at 08:34

## 2024-09-11 RX ADMIN — GLYCOPYRROLATE 1 MG: 1 TABLET ORAL at 14:42

## 2024-09-11 RX ADMIN — ESCITALOPRAM OXALATE 20 MG: 10 TABLET, FILM COATED ORAL at 08:34

## 2024-09-11 RX ADMIN — CLOZAPINE 300 MG: 200 TABLET ORAL at 21:07

## 2024-09-11 RX ADMIN — HYDROCHLOROTHIAZIDE 12.5 MG: 12.5 TABLET ORAL at 08:33

## 2024-09-11 NOTE — SOCIAL WORK
Pt requesting virtual visit with family because today is his niece's birthday. Visit scheduled for tonight at 6pm. Pt notified he has 3 maximum virtual visits per week and he has used 2. Pt encouraged to reach out to aunt and sister to further discuss discharge planning options.

## 2024-09-11 NOTE — PLAN OF CARE
Problem: Anxiety  Goal: Anxiety is at manageable level  Description: Interventions:  - Assess and monitor patient's anxiety level.   - Monitor for signs and symptoms (heart palpitations, chest pain, shortness of breath, headaches, nausea, feeling jumpy, restlessness, irritable, apprehensive).   - Collaborate with interdisciplinary team and initiate plan and interventions as ordered.  - [place] patient to unit/surroundings  - Explain treatment plan  - Encourage participation in care  - Encourage verbalization of concerns/fears  - Identify coping mechanisms  - Assist in developing anxiety-reducing skills  - Administer/offer alternative therapies  - Limit or eliminate stimulants  Outcome: Progressing

## 2024-09-11 NOTE — NURSING NOTE
Alberto is present on the milieu and social with select peers. He consumed 100 % of dinner. Took his medications without incidence. Pt endorses AH of threatening voices that will kill him and his family. Pt is pleasant, polite, and cooperative. Brightens on approach. Virtual visit with brother in law went well. Pt offers no complaints. No behavioral issues.

## 2024-09-11 NOTE — PROGRESS NOTES
"Psychiatry Progress Note [facility]    Alberto Berumen 28 y.o. male MRN: [id number]  Unit/Bed#: [facility] 108-02 Encounter: [id number]  Code Status: Level 1 - Full Code    PCP: Joce Juan MD    Date of Admission:  3/29/2024 2008   Date of Service:  09/11/24    Patient Active Problem List   Diagnosis    GERD (gastroesophageal reflux disease)    Medical clearance for psychiatric admission    Schizoaffective disorder, bipolar type (HCC)    Tobacco abuse    T wave inversion in EKG    Syringoma    Chronic idiopathic constipation    Vitamin B 12 deficiency    Vitamin D deficiency    Confluent and reticulate papillomatosis    Class 2 obesity in adult    Primary hypertension    Elevated hemoglobin A1c    Bilateral lower extremity edema         Review of systems: No symptoms reported  Psychiatric Diagnosis: Schizoaffective Disorder, bipolar type     Assessment  Overall Status: Reported feeling rested and slept more than usual last night, but could not remember the number of hours slept for. He reported feeling \"stable\" but continues to complain of depressive symptoms and rated them 7/10 today. Continues to hear threatening voices at all times throughout the day.   Certification Statement: The patient will continue to require additional inpatient hospital stay due to continued psychotic and severe symptoms of depression.      Assessment & Plan  Schizoaffective disorder, bipolar type (HCC)  Schizoaffective disorder, bipolar type  Continues to have depressive symptoms as well as hearing frequent, threatening auditory hallucinations.   Continues to have paranoid delusions and a blunted affect.  Appears to be making small improvements with his symptoms    Continue aripiprazole 15 mg tablet PO daily  Continue clozapine 200 mg tablet PO before dinner  Continue clozapine 300 mg tablet PO HS  Continue escitalopram 20 mg tablet PO daily  Continue senna-docusate sodium two 50 mg tablets PO HS  Start atropine 1% " solution SL HS  Tobacco abuse           Medications: Abilify 15 mg po daily, clozaril 200 mg po at dinner and 300 mg po HS, B12 1000 mg po daily, lexapro 20 mg po daily, glycopyrrolate 1 mg BID po and 2 mg HS, propranolol 10 mg po BID, senna docusate sodium 2 tablets po HS.  Side effects from treatment: continues to drool at night and forgets to ask for drops   Medication changes   Scheduled atropine drops for his drooling so he does not need to remember to ask for them    Medication education  Risks side effects benefits and precautions of medications discussed with patient and he did verbalize an understanding about risks for metabolic syndrome from being on neuroleptics and is form tardive dyskinesia etc.  All medications reviewed and I recommend that they be continued for symptom management   Understanding of medications: Verbalized    Justification for dual anti-psychotics: Inability to control psychotic symptoms     Non-pharmacological treatments  Continue with individual, group, milieu and occupational therapy using recovery principles and psycho-education about accepting illness and the need for treatment.  Last session of ECT on 9/13    Safety  Safety and communication plan established to target dynamic risk factors discussed above.    Discharge Plan   Group home vs aunt's house    Interval Progress   Patient reported that he slept well and more than usual last night and reports feeling rested. He reported that he woke up with drool on his pillow again this morning so we discussed changing his drops to be scheduled to improve his symptom. He denied any other side effects from medications. He denied any active feelings of suicidal or homicidal thoughts, intents, or plans. He reported the last time he thought about hurting himself was a few months ago but could not remember exactly when, only that it was after he was admitted to the hospital. He continues to hear threatening auditory hallucinations. He rated  his depressive symptoms a 7/10 today and stated it was due to being in his head too much. We discussed plans after discharge and the patient believes that his aunt will let him come back home after discharge and feels unsure about a group home. He reported that he had just finished changing his sheets and planned to take a shower and go to groups today.   Acceptance by patient: Yes   Hopefulness in recovery: Hoping to live with aunt   Involved in reintegration process: Aunt   Trusting in relationship with psychiatrist: Yes  Sleep: Good, estimated 10 hours  Appetite: good, eating three meals per day and drinking 3-4 cups of water per day  Compliance with Medications: Good  Group attendance: 10/11  Significant events: Discussed discharge plans and patient believes that his aunt has approved him moving back in with her. He reported trying to decide between living with her or living in the group home and seemed unsure about the group home.       Mental Status Exam  Appearance: marginal hygiene, dressed in hospital attire, looks stated age, overweight, good eye contact  Behavior: cooperative, calm  Speech: normal rate, normal volume, normal pitch, scant  Mood: depressed  Affect:  depressed, blunted, mood congruent  Thought Process: organized, linear  Thought Content: paranoid ideation, stating that he feels the voices in his head are out to get him and that they are planning to kill him and his family. He denies any active suicidal or homicidal thoughts, plans, or intent. He denies any obsessions, compulsions, or distorted body perceptions. He denies any other experiences that would be categorized as grandiose, somatic, or bizarre.   Perceptual Disturbances: no visual hallucinations, auditory hallucinations telling him that they cannot wait to get out of the hospital so they can shoot him and his family. He reports sometimes the voice sounds like his ex girlfriend and other times they sound like a stranger. He says they  occur all the time but, does not appear responding to internal stimuli  Hx Risk Factors: chronic psychiatric problems, chronic depressive symptoms, chronic psychotic symptoms  Sensorium: alert and oriented to person, place, time and situation  Cognition: recent and remote memory grossly intact  Consciousness: alert and awake  Attention: attention span and concentration are age appropriate  Intellect: appears to be of average intelligence  Insight: limited  Judgement: impaired  Motor Activity: no abnormal movements     Vitals  Temp:  [97.6 °F (36.4 °C)-98 °F (36.7 °C)] 97.8 °F (36.6 °C)  HR:  [92-97] 94  Resp:  [16-18] 18  BP: (114-134)/(72-89) 114/72  SpO2:  [98 %-99 %] 98 %  No intake or output data in the 24 hours ending 09/11/24 1038    Lab Results: All Labs For Current Hospital Admission Reviewed    Current Facility-Administered Medications   Medication Dose Route Frequency Provider Last Rate    acetaminophen  650 mg Oral Q4H PRN Jordan C Holter, DO      acetaminophen  650 mg Oral Q6H PRN HOLLI Lion      aluminum-magnesium hydroxide-simethicone  30 mL Oral Q4H PRN Jordan C Holter, DO      amLODIPine  5 mg Oral Daily HOLLI Lion      ARIPiprazole  15 mg Oral Daily Bora Rosario MD      Artificial Tears  1 drop Both Eyes Q3H PRN Jordan C Holter, DO      atropine  1 drop Sublingual Daily PRN HOLLI Lion      haloperidol lactate  2.5 mg Intramuscular Q4H PRN Max 4/day HOLLI Lion      And    LORazepam  1 mg Intramuscular Q4H PRN Max 4/day HOLLI Lion      And    benztropine  0.5 mg Intramuscular Q4H PRN Max 4/day HOLLI Lion      benztropine  1 mg Intramuscular Q4H PRN Max 6/day Jordan C Holter,       haloperidol lactate  5 mg Intramuscular Q4H PRN Max 4/day HOLLI Lion      And    LORazepam  2 mg Intramuscular Q4H PRN Max 4/day HOLLI Lion      And    benztropine  1 mg Intramuscular Q4H PRN Max 4/day HOLLI Lion      benztropine  1 mg Oral Q4H PRN  Max 6/day HOLLI Lion      benztropine  1 mg Oral Q4H PRN Max 6/day Ion Dupontter, DO      bisacodyl  10 mg Rectal Daily PRN HOLLI Lion      calcium carbonate  500 mg Oral BID PRN HOLLI Monge      cloZAPine  200 mg Oral Before Dinner Bora Rosario MD      cloZAPine  300 mg Oral HS Bora Rosario MD      cyanocobalamin  1,000 mcg Oral Daily Pia Mantilla, HOLLI      hydrOXYzine HCL  50 mg Oral Q6H PRN Max 4/day Ion C Holter, DO      Or    diphenhydrAMINE  50 mg Intramuscular Q6H PRN Ion C Holter, DO      hydrOXYzine HCL  50 mg Oral Q6H PRN Max 4/day HOLLI Lion      Or    diphenhydrAMINE  50 mg Intramuscular Q6H PRN HOLLI Lion      diphenhydrAMINE-zinc acetate   Topical BID PRN HOLLI Lion      escitalopram  20 mg Oral Daily HOLLI Lion      famotidine  20 mg Oral BID HOLLI Monge      fluticasone  1 spray Each Nare Daily Brina Guillen MD      glycopyrrolate  1 mg Oral BID (AM & Afternoon) Bora Rosario MD      glycopyrrolate  2 mg Oral HS Bora Rosario MD      haloperidol  1 mg Oral Q6H PRN HOLLI Lion      haloperidol  2.5 mg Oral Q4H PRN Max 4/day HOLLI Lion      haloperidol  5 mg Oral Q4H PRN Max 4/day HOLLI Lion      hydroCHLOROthiazide  12.5 mg Oral Daily HOLLI Galvan      hydrocortisone   Topical 4x Daily PRN HOLLI Lion      hydrOXYzine HCL  100 mg Oral Q6H PRN Max 4/day Ion FISCHER Holter, DO      Or    LORazepam  2 mg Intramuscular Q6H PRN Ion FISCHER Holter, DO      hydrOXYzine HCL  100 mg Oral Q6H PRN Max 4/day HOLLI Lion      Or    LORazepam  2 mg Intramuscular Q6H PRN HOLLI Lion      hydrOXYzine HCL  25 mg Oral Q6H PRN Max 4/day Ion FISCHER Holter, DO      ibuprofen  600 mg Oral Q8H PRN Eveline Hangey, CRNP      lactated ringers  50 mL/hr Intravenous Continuous Antwan Dong MD      loratadine  10 mg Oral Daily Brina Guillen MD      melatonin  3 mg Oral HS PRN Bora  GAB Rosario MD      metFORMIN  500 mg Oral BID With Meals HOLLI Galvan      methocarbamol  500 mg Oral Q6H PRN HOLLI Lion      nicotine polacrilex  2 mg Oral Q4H PRN Bora Rosario MD      OLANZapine  5 mg Oral Q4H PRN Max 3/day [facility] Holter, DO      Or    OLANZapine  2.5 mg Intramuscular Q4H PRN Max 3/day [facility] Holter, DO      OLANZapine  5 mg Oral Q3H PRN Max 3/day [facility] Holter, DO      Or    OLANZapine  5 mg Intramuscular Q3H PRN Max 3/day [facility] Holter, DO      OLANZapine  2.5 mg Oral Q4H PRN Max 6/day [facility] Holter, DO      ondansetron  4 mg Oral Q6H PRN HOLLI Lion      pantoprazole  20 mg Oral QAM [facility] Holter, DO      polyethylene glycol  17 g Oral Daily PRN HOLLI Ghotra      propranolol  10 mg Oral Q12H KAYLIE HOLLI Lion      senna-docusate sodium  1 tablet Oral Daily PRN [facility] Holter, DO      senna-docusate sodium  2 tablet Oral HS Melvina Ambron, DO      traZODone  50 mg Oral HS PRN HOLLI Lion      white petrolatum-mineral oil   Topical TID PRN HOLLI Lion         Counseling / Coordination of Care: Total floor / unit time spent today 15 minutes. Greater than 50% of total time was spent with the patient and / or family counseling and / or somewhat receptive to supportive listening and teaching positive coping skills to deal with symptom mangement.     Patient's Rights, confidentiality and exceptions to confidentiality, use of automated medical record, [facility] staff access to medical record, and consent to treatment reviewed.    This note has been dictated and hence there may be problems with punctuation, spelling and formatting and if anyone has any concerns please address them to the writer.  This note is not shared with patient due to potential for making patient's condition worse by knowing the content of the note.    Eveline WALLACE

## 2024-09-11 NOTE — PROGRESS NOTES
09/11/24 0726   Team Meeting   Meeting Type Daily Rounds   Team Members Present   Team Members Present Physician;Nurse;;Other (Discipline and Name)   Patient/Family Present   Patient Present No   Patient's Family Present No     In attendance:  Dr. Alex Thomas, MD Dr. Jordan Holter, DO Guy Taylor, RN  Luisana Alvarado, [facility]  Carla Lux, [facility]  Irais Mcdowell, M.S.    Groups: 10/11    Pt denies symptoms other than AH. Pt appropriate with female peer. Pt had virtual visit with REF[facility]IO. Final ECT 9/13. Waiting on CRR availability.

## 2024-09-11 NOTE — NURSING NOTE
"Alberto was calm and cooperative during assessment. He reports moderate depression and AH stating \"they will kill me and my family.\" He stated that they are more bothersome than normal and he was having a more difficult time distracting himself from the AH. He denies SI, SH, HI, and VH. He reports that he is trying to walk and talk to peers in group and the milieu to distract himself. He was compliant with medications and requested PRN nicotine gum. This RN offered PRN medication if IS worsened and made him more agitated or anxious, and Alberto agreed that he would ask nursing if he felt he was not able to cope with coping skills. He denies new concerns at this time.   "

## 2024-09-11 NOTE — ASSESSMENT & PLAN NOTE
Schizoaffective disorder, bipolar type  Continues to have depressive symptoms as well as hearing frequent, threatening auditory hallucinations.   Continues to have paranoid delusions and a blunted affect.  Appears to be making small improvements with his symptoms    Continue aripiprazole 15 mg tablet PO daily  Continue clozapine 200 mg tablet PO before dinner  Continue clozapine 300 mg tablet PO HS  Continue escitalopram 20 mg tablet PO daily  Continue senna-docusate sodium two 50 mg tablets PO HS  Continue atropine 1% solution SL HS

## 2024-09-12 PROCEDURE — 99232 SBSQ HOSP IP/OBS MODERATE 35: CPT | Performed by: PSYCHIATRY & NEUROLOGY

## 2024-09-12 RX ADMIN — CLOZAPINE 200 MG: 200 TABLET ORAL at 17:01

## 2024-09-12 RX ADMIN — FAMOTIDINE 20 MG: 20 TABLET ORAL at 17:00

## 2024-09-12 RX ADMIN — ESCITALOPRAM OXALATE 20 MG: 10 TABLET, FILM COATED ORAL at 08:33

## 2024-09-12 RX ADMIN — HYDROCHLOROTHIAZIDE 12.5 MG: 12.5 TABLET ORAL at 08:32

## 2024-09-12 RX ADMIN — ARIPIPRAZOLE 15 MG: 15 TABLET ORAL at 08:33

## 2024-09-12 RX ADMIN — NICOTINE POLACRILEX 2 MG: 2 GUM, CHEWING ORAL at 17:01

## 2024-09-12 RX ADMIN — METFORMIN HYDROCHLORIDE 500 MG: 500 TABLET, FILM COATED ORAL at 17:01

## 2024-09-12 RX ADMIN — NICOTINE POLACRILEX 2 MG: 2 GUM, CHEWING ORAL at 13:01

## 2024-09-12 RX ADMIN — CLOZAPINE 300 MG: 200 TABLET ORAL at 21:18

## 2024-09-12 RX ADMIN — PROPRANOLOL HYDROCHLORIDE 10 MG: 10 TABLET ORAL at 21:19

## 2024-09-12 RX ADMIN — LORATADINE 10 MG: 10 TABLET ORAL at 08:33

## 2024-09-12 RX ADMIN — CYANOCOBALAMIN TAB 1000 MCG 1000 MCG: 1000 TAB at 08:33

## 2024-09-12 RX ADMIN — PROPRANOLOL HYDROCHLORIDE 10 MG: 10 TABLET ORAL at 08:33

## 2024-09-12 RX ADMIN — SENNOSIDES AND DOCUSATE SODIUM 2 TABLET: 8.6; 5 TABLET ORAL at 21:18

## 2024-09-12 RX ADMIN — FAMOTIDINE 20 MG: 20 TABLET ORAL at 08:34

## 2024-09-12 RX ADMIN — METFORMIN HYDROCHLORIDE 500 MG: 500 TABLET, FILM COATED ORAL at 08:33

## 2024-09-12 RX ADMIN — NICOTINE POLACRILEX 2 MG: 2 GUM, CHEWING ORAL at 08:34

## 2024-09-12 RX ADMIN — PANTOPRAZOLE SODIUM 20 MG: 20 TABLET, DELAYED RELEASE ORAL at 08:33

## 2024-09-12 RX ADMIN — GLYCOPYRROLATE 1 MG: 1 TABLET ORAL at 08:32

## 2024-09-12 RX ADMIN — NICOTINE POLACRILEX 2 MG: 2 GUM, CHEWING ORAL at 21:19

## 2024-09-12 RX ADMIN — GLYCOPYRROLATE 1 MG: 1 TABLET ORAL at 13:01

## 2024-09-12 RX ADMIN — GLYCOPYRROLATE 2 MG: 1 TABLET ORAL at 21:18

## 2024-09-12 NOTE — SOCIAL WORK
BRANDY sent email to pt's aunt inquiring about status of her willingness to take pt home. SW notified aunt in writing that current CRR waiting lists are several months long.

## 2024-09-12 NOTE — PLAN OF CARE
Problem: Alteration in Thoughts and Perception  Goal: Verbalize thoughts and feelings  Description: Interventions:  - Promote a nonjudgmental and trusting relationship with the patient through active listening and therapeutic communication  - Assess patient's level of functioning, behavior and potential for risk  - Engage patient in 1 on 1 interactions  - Encourage patient to express fears, feelings, frustrations, and discuss symptoms    - [place] patient to reality, help patient recognize reality-based thinking   - Administer medications as ordered and assess for potential side effects  - Provide the patient education related to the signs and symptoms of the illness and desired effects of prescribed medications  Outcome: Progressing  Goal: Refrain from acting on delusional thinking/internal stimuli  Description: Interventions:  - Monitor patient closely, per order   - Utilize least restrictive measures   - Set reasonable limits, give positive feedback for acceptable   - Administer medications as ordered and monitor of potential side effects  Outcome: Progressing  Goal: Agree to be compliant with medication regime, as prescribed and report medication side effects  Description: Interventions:  - Offer appropriate PRN medication and supervise ingestion; conduct AIMS, as needed   Outcome: Progressing  Goal: Attend and participate in unit activities, including therapeutic, recreational, and educational groups  Description: Interventions:  -Encourage Visitation and family involvement in care  Outcome: Progressing  Goal: Recognize dysfunctional thoughts, communicate reality-based thoughts at the time of discharge  Description: Interventions:  - Provide medication and psycho-education to assist patient in compliance and developing insight into his/her illness   Outcome: Progressing  Goal: Complete daily ADLs, including personal hygiene independently, as able  Description: Interventions:  - Observe, teach, and assist  patient with ADLS  - Monitor and promote a balance of rest/activity, with adequate nutrition and elimination   Outcome: Progressing     Problem: Depression  Goal: Treatment Goal: Demonstrate behavioral control of depressive symptoms, verbalize feelings of improved mood/affect, and adopt new coping skills prior to discharge  Outcome: Progressing  Goal: Verbalize thoughts and feelings  Description: Interventions:  - Assess and re-assess patient's level of risk   - Engage patient in 1:1 interactions, daily, for a minimum of 15 minutes   - Encourage patient to express feelings, fears, frustrations, hopes   Outcome: Progressing  Goal: Refrain from harming self  Description: Interventions:  - Monitor patient closely, per order   - Supervise medication ingestion, monitor effects and side effects   Outcome: Progressing  Goal: Refrain from isolation  Description: Interventions:  - Develop a trusting relationship   - Encourage socialization   Outcome: Progressing  Goal: Refrain from self-neglect  Outcome: Progressing  Goal: Attend and participate in unit activities, including therapeutic, recreational, and educational groups  Description: Interventions:  - Provide therapeutic and educational activities daily, encourage attendance and participation, and document same in the medical record   Outcome: Progressing  Goal: Complete daily ADLs, including personal hygiene independently, as able  Description: Interventions:  - Observe, teach, and assist patient with ADLS  -  Monitor and promote a balance of rest/activity, with adequate nutrition and elimination   Outcome: Progressing     Problem: Anxiety  Goal: Anxiety is at manageable level  Description: Interventions:  - Assess and monitor patient's anxiety level.   - Monitor for signs and symptoms (heart palpitations, chest pain, shortness of breath, headaches, nausea, feeling jumpy, restlessness, irritable, apprehensive).   - Collaborate with interdisciplinary team and initiate  plan and interventions as ordered.  - [place] patient to unit/surroundings  - Explain treatment plan  - Encourage participation in care  - Encourage verbalization of concerns/fears  - Identify coping mechanisms  - Assist in developing anxiety-reducing skills  - Administer/offer alternative therapies  - Limit or eliminate stimulants  Outcome: Progressing     Problem: Alteration in Orientation  Goal: Treatment Goal: Demonstrate a reduction of confusion and improved orientation to person, place, time and/or situation upon discharge, according to optimum baseline/ability  Outcome: Progressing  Goal: Interact with staff daily  Description: Interventions:  - Assess and re-assess patient's level of orientation  - Engage patient in 1 on 1 interactions, daily, for a minimum of 15 minutes   - Establish rapport/trust with patient   Outcome: Progressing  Goal: Express concerns related to confused thinking related to:  Description: Interventions:  - Encourage patient to express feelings, fears, frustrations, hopes  - Assign consistent caregivers   - [place]/re-orient patient as needed  - Allow comfort items, as appropriate  - Provide visual cues, signs, etc.   Outcome: Progressing  Goal: Allow medical examinations, as recommended  Description: Interventions:  - Provide physical/neurological exams and/or referrals, per provider   Outcome: Progressing  Goal: Cooperate with recommended testing/procedures  Description: Interventions:  - Determine need for ancillary testing  - Observe for mental status changes  - Implement falls/precaution protocol   Outcome: Progressing  Goal: Attend and participate in unit activities, including therapeutic, recreational, and educational groups  Description: Interventions:  - Provide therapeutic and educational activities daily, encourage attendance and participation, and document same in the medical record   - Provide appropriate opportunities for reminiscence   - Provide a consistent daily routine    - Encourage family contact/visitation   Outcome: Progressing  Goal: Complete daily ADLs, including personal hygiene independently, as able  Description: Interventions:  - Observe, teach, and assist patient with ADLS  Outcome: Progressing     Problem: DISCHARGE PLANNING - CARE MANAGEMENT  Goal: Discharge to post-acute care or home with appropriate resources  Description: INTERVENTIONS:  - Conduct assessment to determine patient/family and health care team treatment goals, and need for post-acute services based on payer coverage, community resources, and patient preferences, and barriers to discharge  - Address psychosocial, clinical, and financial barriers to discharge as identified in assessment in conjunction with the patient/family and health care team  - Arrange appropriate level of post-acute services according to patient's   needs and preference and payer coverage in collaboration with the physician and health care team  - Communicate with and update the patient/family, physician, and health care team regarding progress on the discharge plan  - Arrange appropriate transportation to post-acute venues  Outcome: Progressing     Problem: Alteration in Thoughts and Perception  Goal: Treatment Goal: Gain control of psychotic behaviors/thinking, reduce/eliminate presenting symptoms and demonstrate improved reality functioning upon discharge  Outcome: Not Progressing

## 2024-09-12 NOTE — PROGRESS NOTES
09/12/24 0737   Team Meeting   Meeting Type Daily Rounds   Team Members Present   Team Members Present Physician;Nurse;;Other (Discipline and Name)   Patient/Family Present   Patient Present No   Patient's Family Present No     In attendance:  MD Guy Gamino, RN  Luisana lAvarado, [facility]  Carla Lux, [facility]  Irais Mcdowell, M.S.    Groups: 7/10    Pt last ECT 9/13. No symptoms reported other than ongoing AH. Pt visible, appropriate. CRR waitlist.

## 2024-09-12 NOTE — ASSESSMENT & PLAN NOTE
Continue amlodipine 5 mg tablet po daily per medical team  Continue hydrochlorothiazide 12.5 mg tablet po daily per medical team  Continue propranolol 10 mg tablet po Q 12 hours per medical team

## 2024-09-12 NOTE — ASSESSMENT & PLAN NOTE
Continue famotidine 20 mg tablet BID per medical team  Continue glycopyrrolate 1 mg tablet BID per medical team  Continue pantoprazole EC 10 gm table po daily in the morning per medical team.

## 2024-09-12 NOTE — NURSING NOTE
Received pt in bed at change of shift with eyes closed; chest movement noted.  Continues the same thus this far as per q 7 min room checks.   Will continue to monitor behavior, sleeping pattern and any medical issues that may arise.    0602:  Sleeping 7+ hrs thus this far

## 2024-09-12 NOTE — NURSING NOTE
"Alberto was calm and cooperative during assessment. He reports mild depression related to his AH saying \"they will kill me and my family.\" He denies anxiety, SI, SH, HI, and AVH. He states that he is able to distract himself from his AH by talking to peers in the milieu and walking. He was medication compliant and requested nicorette gum as a PRN. He stated his goal today was to \"stay out of my head.\" He denies new concerns at this time.   "

## 2024-09-12 NOTE — PROGRESS NOTES
"Psychiatry Progress Note [facility]    Alberto Berumen 28 y.o. male MRN: [id number]  Unit/Bed#: [facility] 108-02 Encounter: [id number]  Code Status: Level 1 - Full Code    PCP: Joce Juan MD    Date of Admission:  3/29/2024 2008   Date of Service:  09/12/24    Patient Active Problem List   Diagnosis    GERD (gastroesophageal reflux disease)    Medical clearance for psychiatric admission    Schizoaffective disorder, bipolar type (HCC)    Tobacco abuse    T wave inversion in EKG    Syringoma    Chronic idiopathic constipation    Vitamin B 12 deficiency    Vitamin D deficiency    Confluent and reticulate papillomatosis    Class 2 obesity in adult    Primary hypertension    Elevated hemoglobin A1c    Bilateral lower extremity edema         Review of systems: No symptoms reported  Psychiatric Diagnosis: Schizoaffective Disorder, Bipolar Type     Assessment  Overall Status: Reported feeling rested and slept for about 6-7 hours last night. He reported feeling \"stable\" but not really happy or sad.  He continues to complain of depressive symptoms and rated them a 6/10 today. Continues to hear threatening auditory hallucinations at all times throughout the day.  Certification Statement: The patient will continue to require additional inpatient hospital stay due to continued psychotic and severe symptoms of depression.      Assessment & Plan  Schizoaffective disorder, bipolar type (HCC)  Schizoaffective disorder, bipolar type  Continues to have depressive symptoms as well as hearing frequent, threatening auditory hallucinations.   Continues to have paranoid delusions and a blunted affect.  Appears to be making small improvements with his symptoms    Continue aripiprazole 15 mg tablet PO daily  Continue clozapine 200 mg tablet PO before dinner  Continue clozapine 300 mg tablet PO HS  Continue escitalopram 20 mg tablet PO daily  Continue senna-docusate sodium two 50 mg tablets PO HS  Continue atropine 1% " solution SL HS  Tobacco abuse    GERD (gastroesophageal reflux disease)  Continue famotidine 20 mg tablet BID per medical team  Continue glycopyrrolate 1 mg tablet BID per medical team  Continue pantoprazole EC 10 gm table po daily in the morning per medical team.    Primary hypertension  Continue amlodipine 5 mg tablet po daily per medical team  Continue hydrochlorothiazide 12.5 mg tablet po daily per medical team  Continue propranolol 10 mg tablet po Q 12 hours per medical team  Elevated hemoglobin A1c  Continue metformin 500 mg tablet po BID per medical team  Chronic idiopathic constipation  Continue senna docusate sodium two 50 mg tablet po HS          Medications:   Abilify 15 mg po daily, clozaril 200 mg po at dinner and 300 mg po HS, B12 1,000 mg po daily, lexapro 20 mg po daily, glycopyrrolate 1 mg BID po and 2 mg HS, propranolol 10 mg po BID, senna docusate sodium 2 tablets po HS  Side effects from treatment: None reported   Medication changes   None   Medication education  Risks side effects benefits and precautions of medications discussed with patient and he did verbalize an understanding about risks for metabolic syndrome from being on neuroleptics and is form tardive dyskinesia etc.  All medications reviewed and I recommend that they be continued for symptom management   Understanding of medications: Verbalized    Justification for dual anti-psychotics: Inability to control psychotic symptoms     Non-pharmacological treatments  Continue with individual, group, milieu and occupational therapy using recovery principles and psycho-education about accepting illness and the need for treatment.  Last session of ECT 9/13    Safety  Safety and communication plan established to target dynamic risk factors discussed above.    Discharge Plan   Group home vs aunt's house    Interval Progress   Patient reported that he slept well and feels rested after sleeping for approximately 6-7 hours last night. He reported  "that the atropine drops that were scheduled last night improved his drooling. He denied any other side effects from medications. He denied any active feelings of suicidal or homicidal thoughts, plans, or intents. He continues to hear threatening auditory hallucinations that he reports he hears \"pretty much all the time\". He rated his depressive symptoms a 6/10 today and stated nothing in particular improved them, just that he woke up feeling better. We briefly discussed discharge plans and he stated he was still looking into his options. He reported that he plans on attending groups today.   Acceptance by patient: Yes   Hopefulness in recovery: Hoping to live with aunt   Involved in reintegration process: Aunt   Trusting in relationship with psychiatrist: Yes  Sleep: Good, 6-7 hours, feels rested  Appetite: Good  Compliance with Medications: Good  Group attendance: 7/10  Significant events: Discussed improvement of drooling symptom since scheduling the atropine drops yesterday.       Mental Status Exam  Appearance: marginal hygiene, dressed in hospital attire, looks stated age, overweight, good eye contact  Behavior: cooperative, calm  Speech: normal rate, normal volume, normal pitch, scant  Mood: depressed, dysphoric  Affect:  depressed, blunted, mood congruent  Thought Process: organized, linear  Thought Content: paranoid ideation, stating that he feels the voices are going to kill him, however admitted that the voices have never made him do anything that he can remember. He denies any active suicidal or homicidal thoughts, plans, or intents. He denies any obsessions, compulsions, or distorted body perceptions. He denies any other experiences that would be categorized as grandiose, somatic, or bizarre.  Perceptual Disturbances: no visual hallucinations, auditory hallucinations telling him they cannot wait for him to get out of the hospital so they can shoot him and his family. Patient reported that they sometimes " sound like his ex girlfriend and that other times they sound like strangers. He reported that they occur all the time but, does not appear responding to internal stimuli  Hx Risk Factors: chronic psychiatric problems, chronic depressive symptoms, chronic psychotic symptoms  Sensorium: alert and oriented to person, place, time and situation  Cognition: recent and remote memory grossly intact  Consciousness: alert and awake  Attention: attention span and concentration are age appropriate  Intellect: appears to be of average intelligence  Insight: limited  Judgement: impaired  Motor Activity: no abnormal movements     Vitals  Temp:  [97.5 °F (36.4 °C)-97.6 °F (36.4 °C)] 97.6 °F (36.4 °C)  HR:  [97-99] 97  Resp:  [18] 18  BP: (113-150)/(73-93) 113/73  SpO2:  [97 %-99 %] 97 %  No intake or output data in the 24 hours ending 09/12/24 1029    Lab Results: All Labs For Current Hospital Admission Reviewed    Current Facility-Administered Medications   Medication Dose Route Frequency Provider Last Rate    acetaminophen  650 mg Oral Q4H PRN Jordan C Holter, DO      acetaminophen  650 mg Oral Q6H PRN HOLLI Lion      aluminum-magnesium hydroxide-simethicone  30 mL Oral Q4H PRN Jordan C Holter, DO      amLODIPine  5 mg Oral Daily HOLLI Lion      ARIPiprazole  15 mg Oral Daily Bora Rosario MD      Artificial Tears  1 drop Both Eyes Q3H PRN Jordan C Holter, DO      atropine  1 drop Sublingual HS Harjeet Torres,       haloperidol lactate  2.5 mg Intramuscular Q4H PRN Max 4/day HOLLI Lion      And    LORazepam  1 mg Intramuscular Q4H PRN Max 4/day HOLLI Lion      And    benztropine  0.5 mg Intramuscular Q4H PRN Max 4/day HOLLI Lion      benztropine  1 mg Intramuscular Q4H PRN Max 6/day Jordan C Holter, DO      haloperidol lactate  5 mg Intramuscular Q4H PRN Max 4/day HOLLI Lion      And    LORazepam  2 mg Intramuscular Q4H PRN Max 4/day HOLLI Lion      And    benztropine  1  mg Intramuscular Q4H PRN Max 4/day HOLLI Lion      benztropine  1 mg Oral Q4H PRN Max 6/day HOLLI Lion      benztropine  1 mg Oral Q4H PRN Max 6/day Ion FISCHER Holter, DO      bisacodyl  10 mg Rectal Daily PRN HOLLI Lion      calcium carbonate  500 mg Oral BID PRN HOLLI Monge      cloZAPine  200 mg Oral Before Dinner Bora Rosario MD      cloZAPine  300 mg Oral HS Bora Rosario MD      cyanocobalamin  1,000 mcg Oral Daily Pia Mantilla, HOLLI      hydrOXYzine HCL  50 mg Oral Q6H PRN Max 4/day Ion FISCHER Holter, DO      Or    diphenhydrAMINE  50 mg Intramuscular Q6H PRN Ion FISCHER Holter, DO      hydrOXYzine HCL  50 mg Oral Q6H PRN Max 4/day HOLLI Lion      Or    diphenhydrAMINE  50 mg Intramuscular Q6H PRN HOLLI Lion      diphenhydrAMINE-zinc acetate   Topical BID PRN HOLLI Lion      escitalopram  20 mg Oral Daily HOLLI Lion      famotidine  20 mg Oral BID HOLLI Monge      fluticasone  1 spray Each Nare Daily Brina Guillen MD      glycopyrrolate  1 mg Oral BID (AM & Afternoon) Bora Rosario MD      glycopyrrolate  2 mg Oral HS Bora Rosario MD      haloperidol  1 mg Oral Q6H PRN HOLLI Lion      haloperidol  2.5 mg Oral Q4H PRN Max 4/day HOLLI Lion      haloperidol  5 mg Oral Q4H PRN Max 4/day HOLLI Lion      hydroCHLOROthiazide  12.5 mg Oral Daily Pia Mantilla, HOLLI      hydrocortisone   Topical 4x Daily PRN HOLLI Lion      hydrOXYzine HCL  100 mg Oral Q6H PRN Max 4/day Ion FISCHER Holter, DO      Or    LORazepam  2 mg Intramuscular Q6H PRN Ion C Holter, DO      hydrOXYzine HCL  100 mg Oral Q6H PRN Max 4/day HOLLI Lion      Or    LORazepam  2 mg Intramuscular Q6H PRN HOLLI Lion      hydrOXYzine HCL  25 mg Oral Q6H PRN Max 4/day Ion C Holter, DO      ibuprofen  600 mg Oral Q8H PRN HOLLI Lion      lactated ringers  50 mL/hr Intravenous Continuous Antwan Dong,  MD      loratadine  10 mg Oral Daily Brina Guillen MD      melatonin  3 mg Oral HS PRN Bora Rosario MD      metFORMIN  500 mg Oral BID With Meals HOLLI Galvan      methocarbamol  500 mg Oral Q6H PRN HOLLI Lion      nicotine polacrilex  2 mg Oral Q4H PRN Bora Rosario MD      OLANZapine  5 mg Oral Q4H PRN Max 3/day [facility] Holter, DO      Or    OLANZapine  2.5 mg Intramuscular Q4H PRN Max 3/day [facility] Holter, DO      OLANZapine  5 mg Oral Q3H PRN Max 3/day [facility] Holter, DO      Or    OLANZapine  5 mg Intramuscular Q3H PRN Max 3/day [facility] Holter, DO      OLANZapine  2.5 mg Oral Q4H PRN Max 6/day [facility] Holter, DO      ondansetron  4 mg Oral Q6H PRN HOLLI Lion      pantoprazole  20 mg Oral QAM [facility] Holter, DO      polyethylene glycol  17 g Oral Daily PRN HOLLI Ghotra      propranolol  10 mg Oral Q12H KAYLIE HOLLI Lion      senna-docusate sodium  1 tablet Oral Daily PRN [facility] Holter, DO      senna-docusate sodium  2 tablet Oral HS Melvina Ambron, DO      traZODone  50 mg Oral HS PRN HOLLI Lion      white petrolatum-mineral oil   Topical TID PRN HOLLI Lion         Counseling / Coordination of Care: Total floor / unit time spent today 15 minutes. Greater than 50% of total time was spent with the patient and / or family counseling and / or somewhat receptive to supportive listening and teaching positive coping skills to deal with symptom mangement.     Patient's Rights, confidentiality and exceptions to confidentiality, use of automated medical record, [facility] staff access to medical record, and consent to treatment reviewed.    This note has been dictated and hence there may be problems with punctuation, spelling and formatting and if anyone has any concerns please address them to the writer.  This note is not shared with patient due to potential for making patient's condition worse by knowing the content of the note.    Eveline  Jennifer WALLACE

## 2024-09-12 NOTE — SOCIAL WORK
"SW and pt met 1:1  SW inquired about pt's current status - \"hanging in there\".   SW inquired about pt's thoughts about discharge and where he wants to go. Pt reports that during her last visit, his aunt said that he can return home to her house. SW encouraged pt to continue to communicate with her and notified pt SW will continue to reach out to her as well.   SW again discussed CRR timeline with pt. Pt reports that he does not feel ready to discharge right away. SW validated concerns but reminded pt that CRR wait lists are several months long. Pt expressed not wanting to remain in the hospital for several additional months and feeling closer to discharge readiness than a few weeks ago. SW assisted with processing and exploring options.   "

## 2024-09-13 PROCEDURE — 99232 SBSQ HOSP IP/OBS MODERATE 35: CPT | Performed by: PSYCHIATRY & NEUROLOGY

## 2024-09-13 RX ADMIN — FAMOTIDINE 20 MG: 20 TABLET ORAL at 17:15

## 2024-09-13 RX ADMIN — GLYCOPYRROLATE 2 MG: 1 TABLET ORAL at 21:26

## 2024-09-13 RX ADMIN — METFORMIN HYDROCHLORIDE 500 MG: 500 TABLET, FILM COATED ORAL at 15:50

## 2024-09-13 RX ADMIN — NICOTINE POLACRILEX 2 MG: 2 GUM, CHEWING ORAL at 17:15

## 2024-09-13 RX ADMIN — GLYCOPYRROLATE 1 MG: 1 TABLET ORAL at 15:49

## 2024-09-13 RX ADMIN — NICOTINE POLACRILEX 2 MG: 2 GUM, CHEWING ORAL at 08:43

## 2024-09-13 RX ADMIN — PROPRANOLOL HYDROCHLORIDE 10 MG: 10 TABLET ORAL at 21:26

## 2024-09-13 RX ADMIN — ATROPINE SULFATE 1 DROP: 10 SOLUTION/ DROPS OPHTHALMIC at 21:41

## 2024-09-13 RX ADMIN — PANTOPRAZOLE SODIUM 20 MG: 20 TABLET, DELAYED RELEASE ORAL at 08:42

## 2024-09-13 RX ADMIN — LORATADINE 10 MG: 10 TABLET ORAL at 08:43

## 2024-09-13 RX ADMIN — ARIPIPRAZOLE 15 MG: 15 TABLET ORAL at 08:42

## 2024-09-13 RX ADMIN — NICOTINE POLACRILEX 2 MG: 2 GUM, CHEWING ORAL at 21:27

## 2024-09-13 RX ADMIN — FAMOTIDINE 20 MG: 20 TABLET ORAL at 08:42

## 2024-09-13 RX ADMIN — SENNOSIDES AND DOCUSATE SODIUM 2 TABLET: 8.6; 5 TABLET ORAL at 21:26

## 2024-09-13 RX ADMIN — CLOZAPINE 300 MG: 200 TABLET ORAL at 21:26

## 2024-09-13 RX ADMIN — GLYCOPYRROLATE 1 MG: 1 TABLET ORAL at 08:42

## 2024-09-13 RX ADMIN — ESCITALOPRAM OXALATE 20 MG: 10 TABLET, FILM COATED ORAL at 08:42

## 2024-09-13 RX ADMIN — CLOZAPINE 200 MG: 200 TABLET ORAL at 15:50

## 2024-09-13 RX ADMIN — CYANOCOBALAMIN TAB 1000 MCG 1000 MCG: 1000 TAB at 08:42

## 2024-09-13 RX ADMIN — METFORMIN HYDROCHLORIDE 500 MG: 500 TABLET, FILM COATED ORAL at 08:43

## 2024-09-13 NOTE — NURSING NOTE
Alberto was due for his ECT this am as per order.  [facility] notified his nurse that pt is not in their schedule.  Dr. Rosario notified via secure chat and stated he will reschedule.

## 2024-09-13 NOTE — PROGRESS NOTES
"Psychiatry Progress Note [facility]    Alberto Berumen 28 y.o. male MRN: [id number]  Unit/Bed#: -02 Encounter: [id number]  Code Status: Level 1 - Full Code    PCP: Joce Juan MD    Date of Admission:  3/29/2024 2008   Date of Service:  09/13/24    Patient Active Problem List   Diagnosis    GERD (gastroesophageal reflux disease)    Medical clearance for psychiatric admission    Schizoaffective disorder, bipolar type (HCC)    Tobacco abuse    T wave inversion in EKG    Syringoma    Chronic idiopathic constipation    Vitamin B 12 deficiency    Vitamin D deficiency    Confluent and reticulate papillomatosis    Class 2 obesity in adult    Primary hypertension    Elevated hemoglobin A1c    Bilateral lower extremity edema         Review of systems: No symptoms reported  Psychiatric Diagnosis: Schizoaffective Disorder, Bipolar Type     Assessment  Overall Status: Reported feeling a little bit rested but was laying in bed during the interview. He reported that his mood was good and his depressive symptoms he rated at a 4/10 today due to reports of hearing the auditory hallucinations less. He could not report how frequently he heard them and only said he hears them \"not a lot\", but still all day.   Certification Statement: The patient will continue to require additional inpatient hospital stay due to continued psychotic and severe symptoms of depression.      Assessment & Plan  Schizoaffective disorder, bipolar type (HCC)  Schizoaffective disorder, bipolar type  Continues to have depressive symptoms as well as hearing frequent, threatening auditory hallucinations.   Continues to have paranoid delusions and a blunted affect.  Appears to be making small improvements with his symptoms    Continue aripiprazole 15 mg tablet PO daily  Continue clozapine 200 mg tablet PO before dinner  Continue clozapine 300 mg tablet PO HS  Continue escitalopram 20 mg tablet PO daily  Continue senna-docusate " sodium two 50 mg tablets PO HS  Continue atropine 1% solution SL HS  Tobacco abuse    GERD (gastroesophageal reflux disease)  Continue famotidine 20 mg tablet BID per medical team  Continue glycopyrrolate 1 mg tablet BID per medical team  Continue pantoprazole EC 10 gm table po daily in the morning per medical team.    Primary hypertension  Continue amlodipine 5 mg tablet po daily per medical team  Continue hydrochlorothiazide 12.5 mg tablet po daily per medical team  Continue propranolol 10 mg tablet po Q 12 hours per medical team  Elevated hemoglobin A1c  Continue metformin 500 mg tablet po BID per medical team  Chronic idiopathic constipation  Continue senna docusate sodium two 50 mg tablet po HS   Class 2 obesity in adult           Medications:   Abilify 15 mg po daily, clozaril 200 mg po at dinner and 300 mg po HS, B12 1,000 mg po daily, lexapro 20 mg po daily, glycopyrrolate 1 mg BID and 2 mg po HS, propranolol 10 mg po BID, senna docusate sodium, 2 tablets po HS.  Side effects from treatment: Reported drooling at night again and stated that he did not receive his atropine drops that were scheduled.   Medication changes   None   Medication education  Risks side effects benefits and precautions of medications discussed with patient and he did verbalize an understanding about risks for metabolic syndrome from being on neuroleptics and is form tardive dyskinesia etc.  All medications reviewed and I recommend that they be continued for symptom management   Understanding of medications: Verbalized    Justification for dual anti-psychotics: Inability to control psychotic symptoms.     Non-pharmacological treatments  Continue with individual, group, milieu and occupational therapy using recovery principles and psycho-education about accepting illness and the need for treatment.  Last session of ECT rescheduled for next week.    Safety  Safety and communication plan established to target dynamic risk factors discussed  above.    Discharge Plan   Group home vs aunt's house    Interval Progress   Patient reported that he slept well last night, approximately 7 hours, and feels slightly rested, but was laying in bed during the interview. He reported that he drooled a lot last night and was not given his atropine drops that were scheduled. He denied any other side effects from the medications. He denied any active feeling of suicidal or homicidal thoughts, plans, or intents. He continues to hear threatening auditory hallucinations, but stated that he is hearing them less frequently. He reported hearing them not as often, but still throughout the entire day. Because of the less frequent auditory hallucinations, he rated his depression slightly lower today at a 4/10. We briefly discussed his discharge plans and he stated he has thought more about his options but is still unsure about what he wants to do.  Acceptance by patient: Yes   Hopefulness in recovery: Hoping to live with aunt   Involved in reintegration process: Aunt    Trusting in relationship with psychiatrist: Yes  Sleep: Good, approximately 7 hours, feels rested  Appetite: Good  Compliance with Medications: Good  Group attendance: 5/10 groups  Significant events: Reported that he is hearing auditory hallucinations less and since then has seen an improvement in his depressive symptoms.       Mental Status Exam  Appearance: marginal hygiene, dressed in hospital attire, looks stated age, overweight, good eye contact  Behavior: cooperative, calm, laying in bed  Speech: normal rate, normal volume, normal pitch, scant  Mood: depressed, dysphoric  Affect:  depressed, blunted, mood congruent  Thought Process: organized, linear  Thought Content: paranoid ideation, stating that he feels the voices are out to get him although reported that he is hearing them less today. He denies any active suicidal or homicidal thoughts, plans, or intents . He denies any obsessions, compulsions, or  distorted body perceptions. He denies any other experiences that would be categorized as grandiose, somatic, or bizarre.   Perceptual Disturbances: no visual hallucinations, auditory hallucinations telling him they cannot wait for him to get out of the hospital so they can shoot him and his family. He reported they sometimes sound like his ex girlfriend and at other times sound like strangers. He reported they are occurring less frequently, but still at all times throughout the day but, does not appear responding to internal stimuli  Hx Risk Factors: chronic psychiatric problems, chronic depressive symptoms, chronic psychotic symptoms  Sensorium: alert and oriented to person, place, time and situation  Cognition: recent and remote memory grossly intact  Consciousness: alert and awake  Attention: attention span and concentration are age appropriate  Intellect: appears to be of average intelligence  Insight: limited  Judgement: impaired  Motor Activity: no abnormal movements     Vitals  Temp:  [97.5 °F (36.4 °C)-99.1 °F (37.3 °C)] 99.1 °F (37.3 °C)  HR:  [69-99] 70  Resp:  [18] 18  BP: (109-121)/(70-79) 109/70  SpO2:  [100 %] 100 %  No intake or output data in the 24 hours ending 09/13/24 0927    Lab Results: All Labs For Current Hospital Admission Reviewed    Current Facility-Administered Medications   Medication Dose Route Frequency Provider Last Rate    acetaminophen  650 mg Oral Q4H PRN Jordan C Holter, DO      acetaminophen  650 mg Oral Q6H PRN HOLLI Lion      aluminum-magnesium hydroxide-simethicone  30 mL Oral Q4H PRN Jordan C Holter, DO      amLODIPine  5 mg Oral Daily HOLLI Lion      ARIPiprazole  15 mg Oral Daily Bora Rosario MD      Artificial Tears  1 drop Both Eyes Q3H PRN Jordan C Holter, DO      atropine  1 drop Sublingual HS Harjeet Torres,       haloperidol lactate  2.5 mg Intramuscular Q4H PRN Max 4/day HOLLI Lion      And    LORazepam  1 mg Intramuscular Q4H PRN Max 4/day  STEVE LionNP      And    benztropine  0.5 mg Intramuscular Q4H PRN Max 4/day HOLLI Lion      benztropine  1 mg Intramuscular Q4H PRN Max 6/day Ion FISCHER Holter, DO      haloperidol lactate  5 mg Intramuscular Q4H PRN Max 4/day HOLLI Lion      And    LORazepam  2 mg Intramuscular Q4H PRN Max 4/day HOLLI Lion      And    benztropine  1 mg Intramuscular Q4H PRN Max 4/day HOLLI Lion      benztropine  1 mg Oral Q4H PRN Max 6/day HOLLI Lion      benztropine  1 mg Oral Q4H PRN Max 6/day Ion FISCHER Holter, DO      bisacodyl  10 mg Rectal Daily PRN HOLLI Lion      calcium carbonate  500 mg Oral BID PRN HOLLI Monge      cloZAPine  200 mg Oral Before Dinner Bora Rosario MD      cloZAPine  300 mg Oral HS Broa Rosario MD      cyanocobalamin  1,000 mcg Oral Daily HOLLI Galvan      hydrOXYzine HCL  50 mg Oral Q6H PRN Max 4/day Ion FISCHER Holter, DO      Or    diphenhydrAMINE  50 mg Intramuscular Q6H PRN Ion FISCHER Holter, DO      hydrOXYzine HCL  50 mg Oral Q6H PRN Max 4/day HOLLI Lion      Or    diphenhydrAMINE  50 mg Intramuscular Q6H PRN HOLLI Lion      diphenhydrAMINE-zinc acetate   Topical BID PRN HOLLI Lion      escitalopram  20 mg Oral Daily HOLLI Lion      famotidine  20 mg Oral BID HOLLI Monge      fluticasone  1 spray Each Nare Daily Brina Guillen MD      glycopyrrolate  1 mg Oral BID (AM & Afternoon) Bora Rosario MD      glycopyrrolate  2 mg Oral HS Bora Rosario MD      haloperidol  1 mg Oral Q6H PRN HOLLI Lion      haloperidol  2.5 mg Oral Q4H PRN Max 4/day HOLLI Lion      haloperidol  5 mg Oral Q4H PRN Max 4/day HOLLI Lion      hydroCHLOROthiazide  12.5 mg Oral Daily Pia Gerow-Morrissey, CRNP      hydrocortisone   Topical 4x Daily PRN HOLLI Lion      hydrOXYzine HCL  100 mg Oral Q6H PRN Max 4/day Ion FISCHER Holter,       Or    LORazepam  2 mg Intramuscular  Q6H PRN [facility] Holter, DO      hydrOXYzine HCL  100 mg Oral Q6H PRN Max 4/day HOLLI Lion      Or    LORazepam  2 mg Intramuscular Q6H PRN HOLLI Lion      hydrOXYzine HCL  25 mg Oral Q6H PRN Max 4/day [facility] Holter, DO      ibuprofen  600 mg Oral Q8H PRN HOLLI Lion      lactated ringers  50 mL/hr Intravenous Continuous Antwan Dong MD      loratadine  10 mg Oral Daily Brina Guillen MD      melatonin  3 mg Oral HS PRN Bora Rosario MD      metFORMIN  500 mg Oral BID With Meals HOLLI Galvan      methocarbamol  500 mg Oral Q6H PRN HOLLI Lion      nicotine polacrilex  2 mg Oral Q4H PRN Bora Rosario MD      OLANZapine  5 mg Oral Q4H PRN Max 3/day [facility] Holter, DO      Or    OLANZapine  2.5 mg Intramuscular Q4H PRN Max 3/day [facility] Holter, DO      OLANZapine  5 mg Oral Q3H PRN Max 3/day [facility] Holter, DO      Or    OLANZapine  5 mg Intramuscular Q3H PRN Max 3/day [facility] Holter, DO      OLANZapine  2.5 mg Oral Q4H PRN Max 6/day [facility] Holter, DO      ondansetron  4 mg Oral Q6H PRN HOLLI Lion      pantoprazole  20 mg Oral QAM [facility] Holter, DO      polyethylene glycol  17 g Oral Daily PRN HOLLI Ghotra      propranolol  10 mg Oral Q12H KAYLIE HOLLI Lion      senna-docusate sodium  1 tablet Oral Daily PRN [facility] Cresencioter, DO      senna-docusate sodium  2 tablet Oral HS Melvina Ambron, DO      traZODone  50 mg Oral HS PRN HOLLI Lion      white petrolatum-mineral oil   Topical TID PRN HOLLI Lion         Counseling / Coordination of Care: Total floor / unit time spent today 15 minutes. Greater than 50% of total time was spent with the patient and / or family counseling and / or somewhat receptive to supportive listening and teaching positive coping skills to deal with symptom mangement.     Patient's Rights, confidentiality and exceptions to confidentiality, use of automated medical record, Behavioral Health Services staff  access to medical record, and consent to treatment reviewed.    This note has been dictated and hence there may be problems with punctuation, spelling and formatting and if anyone has any concerns please address them to the writer.   This note is not shared with patient due to potential for making patient's condition worse by knowing the content of the note.    Eveline WALLACE

## 2024-09-13 NOTE — NURSING NOTE
VS 99.1  70  18  109/70  100%STEPH Dominguez maintain on ongoing SAFE precaution without incident on this shift. Observed resting in bed, respiration even and unlabored. Continuous Q 7 minutes check implemented thru the night.  No behavioral noted

## 2024-09-13 NOTE — NURSING NOTE
Alberto is visible on the unit. He ambulates about the unit with a female peer (SC)   He states he still hears the same tjhings beings said bu the voices threatening him and his family but he is able to tolerate the things being said  He is calm and cooperative and compliant with medications

## 2024-09-13 NOTE — PROGRESS NOTES
09/13/24 0731   Team Meeting   Meeting Type Daily Rounds   Team Members Present   Team Members Present Physician;Nurse;;Other (Discipline and Name)   Patient/Family Present   Patient Present No   Patient's Family Present No     In attendance:  Dr. Alex Thomas, MD Dr. Jordan Holter, DO Guy Taylor, RN  Luisana Alvarado, [facility]  Carla Lux, [facility]  Irais Mcdowell, M.S.    Groups: 5/10    Pt missed ECT today due to scheduling. Pt reports some AH, mild depression. Pt visible, pleasant, no bx issues. Appropriate with female peer. CRR referral pending

## 2024-09-13 NOTE — PLAN OF CARE
Problem: Alteration in Thoughts and Perception  Goal: Verbalize thoughts and feelings  Description: Interventions:  - Promote a nonjudgmental and trusting relationship with the patient through active listening and therapeutic communication  - Assess patient's level of functioning, behavior and potential for risk  - Engage patient in 1 on 1 interactions  - Encourage patient to express fears, feelings, frustrations, and discuss symptoms    - Olga patient to reality, help patient recognize reality-based thinking   - Administer medications as ordered and assess for potential side effects  - Provide the patient education related to the signs and symptoms of the illness and desired effects of prescribed medications  Outcome: Progressing  Goal: Refrain from acting on delusional thinking/internal stimuli  Description: Interventions:  - Monitor patient closely, per order   - Utilize least restrictive measures   - Set reasonable limits, give positive feedback for acceptable   - Administer medications as ordered and monitor of potential side effects  Outcome: Progressing  Goal: Agree to be compliant with medication regime, as prescribed and report medication side effects  Description: Interventions:  - Offer appropriate PRN medication and supervise ingestion; conduct AIMS, as needed   Outcome: Progressing  Goal: Attend and participate in unit activities, including therapeutic, recreational, and educational groups  Description: Interventions:  -Encourage Visitation and family involvement in care  Outcome: Progressing  Goal: Complete daily ADLs, including personal hygiene independently, as able  Description: Interventions:  - Observe, teach, and assist patient with ADLS  - Monitor and promote a balance of rest/activity, with adequate nutrition and elimination   Outcome: Progressing     Problem: Ineffective Coping  Goal: Identifies ineffective coping skills  Outcome: Progressing  Goal: Identifies healthy coping  skills  Outcome: Progressing  Goal: Demonstrates healthy coping skills  Outcome: Progressing  Goal: Participates in unit activities  Description: Interventions:  - Provide therapeutic environment   - Provide required programming   - Redirect inappropriate behaviors   Outcome: Progressing  Goal: Patient/Family participate in treatment and DC plans  Description: Interventions:  - Provide therapeutic environment  Outcome: Progressing     Problem: Depression  Goal: Verbalize thoughts and feelings  Description: Interventions:  - Assess and re-assess patient's level of risk   - Engage patient in 1:1 interactions, daily, for a minimum of 15 minutes   - Encourage patient to express feelings, fears, frustrations, hopes   Outcome: Progressing  Goal: Refrain from harming self  Description: Interventions:  - Monitor patient closely, per order   - Supervise medication ingestion, monitor effects and side effects   Outcome: Progressing  Goal: Refrain from isolation  Description: Interventions:  - Develop a trusting relationship   - Encourage socialization   Outcome: Progressing  Goal: Refrain from self-neglect  Outcome: Progressing  Goal: Attend and participate in unit activities, including therapeutic, recreational, and educational groups  Description: Interventions:  - Provide therapeutic and educational activities daily, encourage attendance and participation, and document same in the medical record   Outcome: Progressing  Goal: Complete daily ADLs, including personal hygiene independently, as able  Description: Interventions:  - Observe, teach, and assist patient with ADLS  -  Monitor and promote a balance of rest/activity, with adequate nutrition and elimination   Outcome: Progressing     Problem: Anxiety  Goal: Anxiety is at manageable level  Description: Interventions:  - Assess and monitor patient's anxiety level.   - Monitor for signs and symptoms (heart palpitations, chest pain, shortness of breath, headaches, nausea,  feeling jumpy, restlessness, irritable, apprehensive).   - Collaborate with interdisciplinary team and initiate plan and interventions as ordered.  - [place] patient to unit/surroundings  - Explain treatment plan  - Encourage participation in care  - Encourage verbalization of concerns/fears  - Identify coping mechanisms  - Assist in developing anxiety-reducing skills  - Administer/offer alternative therapies  - Limit or eliminate stimulants  Outcome: Progressing     Problem: Alteration in Orientation  Goal: Interact with staff daily  Description: Interventions:  - Assess and re-assess patient's level of orientation  - Engage patient in 1 on 1 interactions, daily, for a minimum of 15 minutes   - Establish rapport/trust with patient   Outcome: Progressing

## 2024-09-13 NOTE — NURSING NOTE
"Alberto was calm and cooperative during assessment. He reports moderate depression related to his AH continuing to tell him that \"it will kill me and my family.\" He denies anxiety, SI, SH, HI, and AVH. He was medication compliant and requested PRN nicorette gum. He said his goal today was to \"stay out of my head.\" He denies new concerns at this time.   "

## 2024-09-13 NOTE — PLAN OF CARE
Problem: Ineffective Coping  Goal: Identifies ineffective coping skills  Outcome: Progressing  Goal: Identifies healthy coping skills  Outcome: Progressing  Goal: Demonstrates healthy coping skills  Outcome: Not Progressing  Goal: Participates in unit activities  Description: Interventions:  - Provide therapeutic environment   - Provide required programming   - Redirect inappropriate behaviors   Outcome: Progressing

## 2024-09-14 PROCEDURE — 99232 SBSQ HOSP IP/OBS MODERATE 35: CPT

## 2024-09-14 RX ADMIN — FAMOTIDINE 20 MG: 20 TABLET ORAL at 08:56

## 2024-09-14 RX ADMIN — METFORMIN HYDROCHLORIDE 500 MG: 500 TABLET, FILM COATED ORAL at 17:13

## 2024-09-14 RX ADMIN — AMLODIPINE BESYLATE 5 MG: 5 TABLET ORAL at 08:55

## 2024-09-14 RX ADMIN — ESCITALOPRAM OXALATE 20 MG: 10 TABLET, FILM COATED ORAL at 08:56

## 2024-09-14 RX ADMIN — ARIPIPRAZOLE 15 MG: 15 TABLET ORAL at 08:55

## 2024-09-14 RX ADMIN — CYANOCOBALAMIN TAB 1000 MCG 1000 MCG: 1000 TAB at 08:56

## 2024-09-14 RX ADMIN — PANTOPRAZOLE SODIUM 20 MG: 20 TABLET, DELAYED RELEASE ORAL at 08:56

## 2024-09-14 RX ADMIN — CLOZAPINE 200 MG: 200 TABLET ORAL at 17:13

## 2024-09-14 RX ADMIN — PROPRANOLOL HYDROCHLORIDE 10 MG: 10 TABLET ORAL at 08:55

## 2024-09-14 RX ADMIN — GLYCOPYRROLATE 1 MG: 1 TABLET ORAL at 08:56

## 2024-09-14 RX ADMIN — GLYCOPYRROLATE 1 MG: 1 TABLET ORAL at 13:01

## 2024-09-14 RX ADMIN — HYDROCHLOROTHIAZIDE 12.5 MG: 12.5 TABLET ORAL at 08:56

## 2024-09-14 RX ADMIN — NICOTINE POLACRILEX 2 MG: 2 GUM, CHEWING ORAL at 17:14

## 2024-09-14 RX ADMIN — ATROPINE SULFATE 1 DROP: 10 SOLUTION/ DROPS OPHTHALMIC at 21:43

## 2024-09-14 RX ADMIN — FAMOTIDINE 20 MG: 20 TABLET ORAL at 17:13

## 2024-09-14 RX ADMIN — GLYCOPYRROLATE 2 MG: 1 TABLET ORAL at 21:13

## 2024-09-14 RX ADMIN — NICOTINE POLACRILEX 2 MG: 2 GUM, CHEWING ORAL at 13:01

## 2024-09-14 RX ADMIN — SENNOSIDES AND DOCUSATE SODIUM 2 TABLET: 8.6; 5 TABLET ORAL at 21:13

## 2024-09-14 RX ADMIN — CLOZAPINE 300 MG: 200 TABLET ORAL at 21:13

## 2024-09-14 RX ADMIN — NICOTINE POLACRILEX 2 MG: 2 GUM, CHEWING ORAL at 21:14

## 2024-09-14 RX ADMIN — LORATADINE 10 MG: 10 TABLET ORAL at 08:55

## 2024-09-14 RX ADMIN — PROPRANOLOL HYDROCHLORIDE 10 MG: 10 TABLET ORAL at 21:14

## 2024-09-14 RX ADMIN — NICOTINE POLACRILEX 2 MG: 2 GUM, CHEWING ORAL at 08:56

## 2024-09-14 RX ADMIN — METFORMIN HYDROCHLORIDE 500 MG: 500 TABLET, FILM COATED ORAL at 08:56

## 2024-09-14 NOTE — NURSING NOTE
Compliant with meds and meals. Reports to continue to hear voices. Not as loud but always there. Visible in milieu. Denies HI/ SI. Nicorette gum at 1715, & 2127 Social with select peer,

## 2024-09-14 NOTE — PROGRESS NOTES
09/14/24 1000   Activity/Group Checklist   Group Other (Comment)  ([facility] [employer] [facility], Music)   Attendance Attended   Attendance Duration (min) 46-60  (60 min group)   Interactions Interacted appropriately   Affect/Mood Appropriate   Goals Achieved Able to listen to others;Able to engage in interactions     Patient was able to complete a goal he began weeks ago he was apprehensive about starting. Through risk, trial/error, repetition and exercising choice, patient was able to push through uncertainty and fear of failure to ultimate experience success, improved confidence and ability to gauge his own success independently.

## 2024-09-14 NOTE — PLAN OF CARE
Problem: Alteration in Thoughts and Perception  Goal: Treatment Goal: Gain control of psychotic behaviors/thinking, reduce/eliminate presenting symptoms and demonstrate improved reality functioning upon discharge  Outcome: Progressing  Goal: Verbalize thoughts and feelings  Description: Interventions:  - Promote a nonjudgmental and trusting relationship with the patient through active listening and therapeutic communication  - Assess patient's level of functioning, behavior and potential for risk  - Engage patient in 1 on 1 interactions  - Encourage patient to express fears, feelings, frustrations, and discuss symptoms    - Mack patient to reality, help patient recognize reality-based thinking   - Administer medications as ordered and assess for potential side effects  - Provide the patient education related to the signs and symptoms of the illness and desired effects of prescribed medications  Outcome: Progressing  Goal: Agree to be compliant with medication regime, as prescribed and report medication side effects  Description: Interventions:  - Offer appropriate PRN medication and supervise ingestion; conduct AIMS, as needed   Outcome: Progressing  Goal: Recognize dysfunctional thoughts, communicate reality-based thoughts at the time of discharge  Description: Interventions:  - Provide medication and psycho-education to assist patient in compliance and developing insight into his/her illness   Outcome: Progressing  Goal: Complete daily ADLs, including personal hygiene independently, as able  Description: Interventions:  - Observe, teach, and assist patient with ADLS  - Monitor and promote a balance of rest/activity, with adequate nutrition and elimination   Outcome: Progressing     Problem: Ineffective Coping  Goal: Participates in unit activities  Description: Interventions:  - Provide therapeutic environment   - Provide required programming   - Redirect inappropriate behaviors   Outcome: Progressing      Problem: Depression  Goal: Treatment Goal: Demonstrate behavioral control of depressive symptoms, verbalize feelings of improved mood/affect, and adopt new coping skills prior to discharge  Outcome: Progressing  Goal: Refrain from harming self  Description: Interventions:  - Monitor patient closely, per order   - Supervise medication ingestion, monitor effects and side effects   Outcome: Progressing  Goal: Refrain from isolation  Description: Interventions:  - Develop a trusting relationship   - Encourage socialization   Outcome: Progressing  Goal: Refrain from self-neglect  Outcome: Progressing  Goal: Attend and participate in unit activities, including therapeutic, recreational, and educational groups  Description: Interventions:  - Provide therapeutic and educational activities daily, encourage attendance and participation, and document same in the medical record   Outcome: Progressing  Goal: Complete daily ADLs, including personal hygiene independently, as able  Description: Interventions:  - Observe, teach, and assist patient with ADLS  -  Monitor and promote a balance of rest/activity, with adequate nutrition and elimination   Outcome: Progressing     Problem: Anxiety  Goal: Anxiety is at manageable level  Description: Interventions:  - Assess and monitor patient's anxiety level.   - Monitor for signs and symptoms (heart palpitations, chest pain, shortness of breath, headaches, nausea, feeling jumpy, restlessness, irritable, apprehensive).   - Collaborate with interdisciplinary team and initiate plan and interventions as ordered.  - [place] patient to unit/surroundings  - Explain treatment plan  - Encourage participation in care  - Encourage verbalization of concerns/fears  - Identify coping mechanisms  - Assist in developing anxiety-reducing skills  - Administer/offer alternative therapies  - Limit or eliminate stimulants  Outcome: Progressing     Problem: Alteration in Orientation  Goal: Interact with staff  daily  Description: Interventions:  - Assess and re-assess patient's level of orientation  - Engage patient in 1 on 1 interactions, daily, for a minimum of 15 minutes   - Establish rapport/trust with patient   Outcome: Progressing  Goal: Cooperate with recommended testing/procedures  Description: Interventions:  - Determine need for ancillary testing  - Observe for mental status changes  - Implement falls/precaution protocol   Outcome: Progressing  Goal: Attend and participate in unit activities, including therapeutic, recreational, and educational groups  Description: Interventions:  - Provide therapeutic and educational activities daily, encourage attendance and participation, and document same in the medical record   - Provide appropriate opportunities for reminiscence   - Provide a consistent daily routine   - Encourage family contact/visitation   Outcome: Progressing  Goal: Complete daily ADLs, including personal hygiene independently, as able  Description: Interventions:  - Observe, teach, and assist patient with ADLS  Outcome: Progressing     Problem: DISCHARGE PLANNING - CARE MANAGEMENT  Goal: Discharge to post-acute care or home with appropriate resources  Description: INTERVENTIONS:  - Conduct assessment to determine patient/family and health care team treatment goals, and need for post-acute services based on payer coverage, community resources, and patient preferences, and barriers to discharge  - Address psychosocial, clinical, and financial barriers to discharge as identified in assessment in conjunction with the patient/family and health care team  - Arrange appropriate level of post-acute services according to patient's   needs and preference and payer coverage in collaboration with the physician and health care team  - Communicate with and update the patient/family, physician, and health care team regarding progress on the discharge plan  - Arrange appropriate transportation to post-acute  venues  Outcome: Progressing

## 2024-09-14 NOTE — NURSING NOTE
Alert, cooperative and visible intermittently. No SI or HI noted. Pt states he still have auditory hallucinations. Denies depression, anxiety and pain. Attended exercise, open art, coping, nursing education, and mindful chin. Consumed 100% of breakfast and 100% of lunch. Took all medication without prompting. Maintained on safe precautions without incident. Will continue to monitor progress and recovery program.

## 2024-09-14 NOTE — NURSING NOTE
Alert, cooperative and visible intermittently. Socializing with selective peers. Consumed 100% of dinner. Took all medication without prompting. Nicotine gum administered as ordered. Maintained on safe precautions without incident.

## 2024-09-15 VITALS
TEMPERATURE: 97.8 F | OXYGEN SATURATION: 98 % | HEART RATE: 97 BPM | BODY MASS INDEX: 40.02 KG/M2 | HEIGHT: 66 IN | WEIGHT: 249 LBS | SYSTOLIC BLOOD PRESSURE: 126 MMHG | DIASTOLIC BLOOD PRESSURE: 84 MMHG | RESPIRATION RATE: 18 BRPM

## 2024-09-15 PROCEDURE — 99232 SBSQ HOSP IP/OBS MODERATE 35: CPT

## 2024-09-15 RX ADMIN — HYDROCHLOROTHIAZIDE 12.5 MG: 12.5 TABLET ORAL at 08:58

## 2024-09-15 RX ADMIN — PANTOPRAZOLE SODIUM 20 MG: 20 TABLET, DELAYED RELEASE ORAL at 08:57

## 2024-09-15 RX ADMIN — LORATADINE 10 MG: 10 TABLET ORAL at 08:57

## 2024-09-15 RX ADMIN — ARIPIPRAZOLE 15 MG: 15 TABLET ORAL at 08:57

## 2024-09-15 RX ADMIN — NICOTINE POLACRILEX 2 MG: 2 GUM, CHEWING ORAL at 21:20

## 2024-09-15 RX ADMIN — CLOZAPINE 300 MG: 200 TABLET ORAL at 21:19

## 2024-09-15 RX ADMIN — METFORMIN HYDROCHLORIDE 500 MG: 500 TABLET, FILM COATED ORAL at 08:58

## 2024-09-15 RX ADMIN — NICOTINE POLACRILEX 2 MG: 2 GUM, CHEWING ORAL at 12:58

## 2024-09-15 RX ADMIN — NICOTINE POLACRILEX 2 MG: 2 GUM, CHEWING ORAL at 17:13

## 2024-09-15 RX ADMIN — METFORMIN HYDROCHLORIDE 500 MG: 500 TABLET, FILM COATED ORAL at 17:13

## 2024-09-15 RX ADMIN — FAMOTIDINE 20 MG: 20 TABLET ORAL at 08:58

## 2024-09-15 RX ADMIN — NICOTINE POLACRILEX 2 MG: 2 GUM, CHEWING ORAL at 08:58

## 2024-09-15 RX ADMIN — ESCITALOPRAM OXALATE 20 MG: 10 TABLET, FILM COATED ORAL at 08:57

## 2024-09-15 RX ADMIN — CYANOCOBALAMIN TAB 1000 MCG 1000 MCG: 1000 TAB at 08:58

## 2024-09-15 RX ADMIN — PROPRANOLOL HYDROCHLORIDE 10 MG: 10 TABLET ORAL at 21:20

## 2024-09-15 RX ADMIN — GLYCOPYRROLATE 2 MG: 1 TABLET ORAL at 21:20

## 2024-09-15 RX ADMIN — SENNOSIDES AND DOCUSATE SODIUM 2 TABLET: 8.6; 5 TABLET ORAL at 21:20

## 2024-09-15 RX ADMIN — CLOZAPINE 200 MG: 200 TABLET ORAL at 17:12

## 2024-09-15 RX ADMIN — ATROPINE SULFATE 1 DROP: 10 SOLUTION/ DROPS OPHTHALMIC at 21:41

## 2024-09-15 RX ADMIN — FAMOTIDINE 20 MG: 20 TABLET ORAL at 17:12

## 2024-09-15 RX ADMIN — GLYCOPYRROLATE 1 MG: 1 TABLET ORAL at 14:13

## 2024-09-15 RX ADMIN — GLYCOPYRROLATE 1 MG: 1 TABLET ORAL at 08:57

## 2024-09-15 NOTE — PROGRESS NOTES
Psychiatry Progress Note [facility]    Alberto Berumen 28 y.o. male MRN: [id number]  Unit/Bed#: [facility] 108-02 Encounter: [id number]  Code Status: Level 1 - Full Code    PCP: Joce Juan MD    Date of Admission:  3/29/2024 2008   Date of Service:  09/15/24    Patient Active Problem List   Diagnosis    GERD (gastroesophageal reflux disease)    Medical clearance for psychiatric admission    Schizoaffective disorder, bipolar type (HCC)    Tobacco abuse    T wave inversion in EKG    Syringoma    Chronic idiopathic constipation    Vitamin B 12 deficiency    Vitamin D deficiency    Confluent and reticulate papillomatosis    Class 2 obesity in adult    Primary hypertension    Elevated hemoglobin A1c    Bilateral lower extremity edema         Review of systems: No symptoms reported  Psychiatric Diagnosis: Schizoaffective Disorder, Bipolar Type     Assessment  Overall Status:   Patient reports stable mood with less auditory command hallucinations.   Certification Statement: The patient will continue to require additional inpatient hospital stay due to continued psychotic and severe symptoms of depression.      Assessment & Plan  Schizoaffective disorder, bipolar type (HCC)  Schizoaffective disorder, bipolar type  Continues to have depressive symptoms as well as hearing frequent, threatening auditory hallucinations.   Continues to have paranoid delusions and a blunted affect.  Appears to be making small improvements with his symptoms    Continue aripiprazole 15 mg tablet PO daily  Continue clozapine 200 mg tablet PO before dinner  Continue clozapine 300 mg tablet PO HS  Continue escitalopram 20 mg tablet PO daily  Continue senna-docusate sodium two 50 mg tablets PO HS  Continue atropine 1% solution SL HS  Tobacco abuse    GERD (gastroesophageal reflux disease)  Continue famotidine 20 mg tablet BID per medical team  Continue glycopyrrolate 1 mg tablet BID per medical team  Continue pantoprazole EC 10 gm  table po daily in the morning per medical team.    Primary hypertension  Continue amlodipine 5 mg tablet po daily per medical team  Continue hydrochlorothiazide 12.5 mg tablet po daily per medical team  Continue propranolol 10 mg tablet po Q 12 hours per medical team  Elevated hemoglobin A1c  Continue metformin 500 mg tablet po BID per medical team  Chronic idiopathic constipation  Continue senna docusate sodium two 50 mg tablet po HS   Class 2 obesity in adult           Medications:   Abilify 15 mg po daily, clozaril 200 mg po at dinner and 300 mg po HS, B12 1,000 mg po daily, lexapro 20 mg po daily, glycopyrrolate 1 mg BID and 2 mg po HS, propranolol 10 mg po BID, senna docusate sodium, 2 tablets po HS.  Side effects from treatment: Reported drooling at night again and stated that he did not receive his atropine drops that were scheduled.   Medication changes   None   Medication education  Risks side effects benefits and precautions of medications discussed with patient and he did verbalize an understanding about risks for metabolic syndrome from being on neuroleptics and is form tardive dyskinesia etc.  All medications reviewed and I recommend that they be continued for symptom management   Understanding of medications: Verbalized    Justification for dual anti-psychotics: Inability to control psychotic symptoms.     Non-pharmacological treatments  Continue with individual, group, milieu and occupational therapy using recovery principles and psycho-education about accepting illness and the need for treatment.  Last session of ECT rescheduled for next week.    Safety  Safety and communication plan established to target dynamic risk factors discussed above.    Discharge Plan   Group home vs aunt's house    Interval Progress   Chart reviewed with morning treatment team. Per nursing, patient was seen kissing another peer and was educated on appropriate behaviors on unit, patient was agreeable, visible in the milieu  intermittently, attended 9 out of 10 groups, compliant with medications and meals. Upon approach, patient was sitting alone in the dining room watching TV, he was pleasant, calm and cooperative. Patient reports no change since yesterday, mood stable, denies vh, endorses AH but denies command hallucinations, continues to complain about side effect of shoulder twitch but writer did not observe. Patient denies si/hi.       Acceptance by patient: Yes   Hopefulness in recovery: Hoping to live with aunt   Involved in reintegration process: Aunt    Trusting in relationship with psychiatrist: Yes  Sleep: Good, approximately 7 hours, feels rested  Appetite: Good  Compliance with Medications: Good  Group attendance: 5/10 groups  Significant events: Reported that he is hearing auditory hallucinations less and since then has seen an improvement in his depressive symptoms.       Mental Status Exam  Appearance: marginal hygiene, dressed in hospital attire, looks stated age, overweight, good eye contact  Behavior: cooperative, calm, laying in bed  Speech: normal rate, normal volume, normal pitch, scant  Mood: depressed, dysphoric  Affect:  depressed, blunted, mood congruent  Thought Process: organized, linear  Thought Content: paranoid ideation, stating that he feels the voices are out to get him although reported that he is hearing them less today. He denies any active suicidal or homicidal thoughts, plans, or intents . He denies any obsessions, compulsions, or distorted body perceptions. He denies any other experiences that would be categorized as grandiose, somatic, or bizarre.   Perceptual Disturbances: no visual hallucinations, auditory hallucinations telling him they cannot wait for him to get out of the hospital so they can shoot him and his family. He reported they sometimes sound like his ex girlfriend and at other times sound like strangers. He reported they are occurring less frequently, but still at all times  throughout the day but, does not appear responding to internal stimuli  Hx Risk Factors: chronic psychiatric problems, chronic depressive symptoms, chronic psychotic symptoms  Sensorium: alert and oriented to person, place, time and situation  Cognition: recent and remote memory grossly intact  Consciousness: alert and awake  Attention: attention span and concentration are age appropriate  Intellect: appears to be of average intelligence  Insight: limited  Judgement: impaired  Motor Activity: no abnormal movements     Vitals  Temp:  [97.8 °F (36.6 °C)-97.9 °F (36.6 °C)] 97.8 °F (36.6 °C)  HR:  [] 111  Resp:  [16-18] 18  BP: (119-135)/(58-89) 127/87  SpO2:  [98 %-99 %] 98 %  No intake or output data in the 24 hours ending 09/15/24 1539    Lab Results: All Labs For Current Hospital Admission Reviewed    Current Facility-Administered Medications   Medication Dose Route Frequency Provider Last Rate    acetaminophen  650 mg Oral Q4H PRN Jordan C Holter, DO      acetaminophen  650 mg Oral Q6H PRN HOLLI Lion      aluminum-magnesium hydroxide-simethicone  30 mL Oral Q4H PRN Jordan C Holter, DO      amLODIPine  5 mg Oral Daily HOLLI Lion      ARIPiprazole  15 mg Oral Daily Bora Rosario MD      Artificial Tears  1 drop Both Eyes Q3H PRN Jordan C Holter, DO      atropine  1 drop Sublingual  Harjeet Torres, DO      haloperidol lactate  2.5 mg Intramuscular Q4H PRN Max 4/day HOLLI Lion      And    LORazepam  1 mg Intramuscular Q4H PRN Max 4/day HOLLI Lion      And    benztropine  0.5 mg Intramuscular Q4H PRN Max 4/day HOLLI Lion      benztropine  1 mg Intramuscular Q4H PRN Max 6/day Jordan C Holter, DO      haloperidol lactate  5 mg Intramuscular Q4H PRN Max 4/day HOLLI Lion      And    LORazepam  2 mg Intramuscular Q4H PRN Max 4/day HOLLI Lion      And    benztropine  1 mg Intramuscular Q4H PRN Max 4/day HOLLI Lion      benztropine  1 mg Oral Q4H PRN Max  6/day HOLLI Lion      benztropine  1 mg Oral Q4H PRN Max 6/day Ion Dupontter, DO      bisacodyl  10 mg Rectal Daily PRN HOLLI Lion      calcium carbonate  500 mg Oral BID PRN HOLLI Monge      cloZAPine  200 mg Oral Before Dinner Bora Rosario MD      cloZAPine  300 mg Oral HS Bora Rosario MD      cyanocobalamin  1,000 mcg Oral Daily Pia Mantilla, HOLLI      hydrOXYzine HCL  50 mg Oral Q6H PRN Max 4/day Ion FISCHER Holter, DO      Or    diphenhydrAMINE  50 mg Intramuscular Q6H PRN Ion Dupontter, DO      hydrOXYzine HCL  50 mg Oral Q6H PRN Max 4/day HOLLI Lion      Or    diphenhydrAMINE  50 mg Intramuscular Q6H PRN HOLLI Lion      diphenhydrAMINE-zinc acetate   Topical BID PRN HOLLI Lion      escitalopram  20 mg Oral Daily HOLLI Lion      famotidine  20 mg Oral BID HOLLI Monge      fluticasone  1 spray Each Nare Daily Brina Guillen MD      glycopyrrolate  1 mg Oral BID (AM & Afternoon) Bora Rosario MD      glycopyrrolate  2 mg Oral HS Bora Rosario MD      haloperidol  1 mg Oral Q6H PRN HOLLI Lion      haloperidol  2.5 mg Oral Q4H PRN Max 4/day HOLLI Lion      haloperidol  5 mg Oral Q4H PRN Max 4/day HOLLI Lion      hydroCHLOROthiazide  12.5 mg Oral Daily HOLLI Galvan      hydrocortisone   Topical 4x Daily PRN HOLLI Lion      hydrOXYzine HCL  100 mg Oral Q6H PRN Max 4/day Ion FISCHER Holter, DO      Or    LORazepam  2 mg Intramuscular Q6H PRN Ion FISCHER Holter, DO      hydrOXYzine HCL  100 mg Oral Q6H PRN Max 4/day HOLLI Lion      Or    LORazepam  2 mg Intramuscular Q6H PRN HOLLI Lion      hydrOXYzine HCL  25 mg Oral Q6H PRN Max 4/day Ion FISCHER Holter, DO      ibuprofen  600 mg Oral Q8H PRN HOLLI Lion      lactated ringers  50 mL/hr Intravenous Continuous Antwan Dong MD      loratadine  10 mg Oral Daily Brina Guillen MD      melatonin  3 mg Oral HS PRN Bora DE GUZMAN  MD Abraham      metFORMIN  500 mg Oral BID With Meals HOLLI Galvan      methocarbamol  500 mg Oral Q6H PRN HOLLI Lion      nicotine polacrilex  2 mg Oral Q4H PRN Bora Rosario MD      OLANZapine  5 mg Oral Q4H PRN Max 3/day [facility] Holter, DO      Or    OLANZapine  2.5 mg Intramuscular Q4H PRN Max 3/day [facility] Holter, DO      OLANZapine  5 mg Oral Q3H PRN Max 3/day [facility] Holter, DO      Or    OLANZapine  5 mg Intramuscular Q3H PRN Max 3/day [facility] Holter, DO      OLANZapine  2.5 mg Oral Q4H PRN Max 6/day [facility] Holter, DO      ondansetron  4 mg Oral Q6H PRN HOLLI Lion      pantoprazole  20 mg Oral QAM [facility] Holter, DO      polyethylene glycol  17 g Oral Daily PRN HOLLI Ghotra      propranolol  10 mg Oral Q12H KAYLIE HOLLI Lion      senna-docusate sodium  1 tablet Oral Daily PRN [facility] Holter, DO      senna-docusate sodium  2 tablet Oral HS Melvina Ambron, DO      traZODone  50 mg Oral HS PRN HOLLI Lion      white petrolatum-mineral oil   Topical TID PRN HOLLI Lion         Counseling / Coordination of Care: Total floor / unit time spent today 15 minutes. Greater than 50% of total time was spent with the patient and / or family counseling and / or somewhat receptive to supportive listening and teaching positive coping skills to deal with symptom mangement.     Patient's Rights, confidentiality and exceptions to confidentiality, use of automated medical record, [facility] staff access to medical record, and consent to treatment reviewed.      HOLLI Hilario

## 2024-09-15 NOTE — NURSING NOTE
Alert, cooperative and visible intermittently. Pt socializing with selective peers. Consumed 100% of dinner. Took all medication without prompting. Maintained on safe precautions without incident.

## 2024-09-15 NOTE — NURSING NOTE
Alert, cooperative and visible intermittently. Socializing with selective peers. No SI or HI noted. Denies depression, anxiety and pain. Pt continues t have auditory hallucinations. Attended exercise and spirituality. Consumed 100% of breakfast and  Refused lunch. Took all medication without prompting. Maintained on safe precautions without incident. Will continue to monitor progress and recovery program.

## 2024-09-15 NOTE — NURSING NOTE
Alberto maintained on ongoing SAFE precaution without incident on this shift.  Awake, alert , visible, withdrawn, and cooperative upon approach.   Attended and participated in 9 out of 10 groups today.  Continues to be compliant with medication regimen and had snack.  No inappropriate contact between him and a female peer noted.  Denies all psych symptoms

## 2024-09-15 NOTE — PLAN OF CARE
Problem: Alteration in Thoughts and Perception  Goal: Treatment Goal: Gain control of psychotic behaviors/thinking, reduce/eliminate presenting symptoms and demonstrate improved reality functioning upon discharge  Outcome: Progressing  Goal: Verbalize thoughts and feelings  Description: Interventions:  - Promote a nonjudgmental and trusting relationship with the patient through active listening and therapeutic communication  - Assess patient's level of functioning, behavior and potential for risk  - Engage patient in 1 on 1 interactions  - Encourage patient to express fears, feelings, frustrations, and discuss symptoms    - [place] patient to reality, help patient recognize reality-based thinking   - Administer medications as ordered and assess for potential side effects  - Provide the patient education related to the signs and symptoms of the illness and desired effects of prescribed medications  Outcome: Progressing  Goal: Refrain from acting on delusional thinking/internal stimuli  Description: Interventions:  - Monitor patient closely, per order   - Utilize least restrictive measures   - Set reasonable limits, give positive feedback for acceptable   - Administer medications as ordered and monitor of potential side effects  Outcome: Progressing  Goal: Agree to be compliant with medication regime, as prescribed and report medication side effects  Description: Interventions:  - Offer appropriate PRN medication and supervise ingestion; conduct AIMS, as needed   Outcome: Progressing  Goal: Attend and participate in unit activities, including therapeutic, recreational, and educational groups  Description: Interventions:  -Encourage Visitation and family involvement in care  Outcome: Progressing  Goal: Recognize dysfunctional thoughts, communicate reality-based thoughts at the time of discharge  Description: Interventions:  - Provide medication and psycho-education to assist patient in compliance and developing  insight into his/her illness   Outcome: Progressing  Goal: Complete daily ADLs, including personal hygiene independently, as able  Description: Interventions:  - Observe, teach, and assist patient with ADLS  - Monitor and promote a balance of rest/activity, with adequate nutrition and elimination   Outcome: Progressing     Problem: Ineffective Coping  Goal: Identifies ineffective coping skills  Outcome: Progressing  Goal: Identifies healthy coping skills  Outcome: Progressing  Goal: Demonstrates healthy coping skills  Outcome: Progressing  Goal: Participates in unit activities  Description: Interventions:  - Provide therapeutic environment   - Provide required programming   - Redirect inappropriate behaviors   Outcome: Progressing  Goal: Patient/Family participate in treatment and DC plans  Description: Interventions:  - Provide therapeutic environment  Outcome: Progressing  Goal: Patient/Family verbalizes awareness of resources  Outcome: Progressing     Problem: Depression  Goal: Treatment Goal: Demonstrate behavioral control of depressive symptoms, verbalize feelings of improved mood/affect, and adopt new coping skills prior to discharge  Outcome: Progressing  Goal: Verbalize thoughts and feelings  Description: Interventions:  - Assess and re-assess patient's level of risk   - Engage patient in 1:1 interactions, daily, for a minimum of 15 minutes   - Encourage patient to express feelings, fears, frustrations, hopes   Outcome: Progressing  Goal: Refrain from harming self  Description: Interventions:  - Monitor patient closely, per order   - Supervise medication ingestion, monitor effects and side effects   Outcome: Progressing  Goal: Refrain from isolation  Description: Interventions:  - Develop a trusting relationship   - Encourage socialization   Outcome: Progressing  Goal: Refrain from self-neglect  Outcome: Progressing  Goal: Attend and participate in unit activities, including therapeutic, recreational, and  educational groups  Description: Interventions:  - Provide therapeutic and educational activities daily, encourage attendance and participation, and document same in the medical record   Outcome: Progressing  Goal: Complete daily ADLs, including personal hygiene independently, as able  Description: Interventions:  - Observe, teach, and assist patient with ADLS  -  Monitor and promote a balance of rest/activity, with adequate nutrition and elimination   Outcome: Progressing     Problem: Anxiety  Goal: Anxiety is at manageable level  Description: Interventions:  - Assess and monitor patient's anxiety level.   - Monitor for signs and symptoms (heart palpitations, chest pain, shortness of breath, headaches, nausea, feeling jumpy, restlessness, irritable, apprehensive).   - Collaborate with interdisciplinary team and initiate plan and interventions as ordered.  - [place] patient to unit/surroundings  - Explain treatment plan  - Encourage participation in care  - Encourage verbalization of concerns/fears  - Identify coping mechanisms  - Assist in developing anxiety-reducing skills  - Administer/offer alternative therapies  - Limit or eliminate stimulants  Outcome: Progressing     Problem: Alteration in Orientation  Goal: Treatment Goal: Demonstrate a reduction of confusion and improved orientation to person, place, time and/or situation upon discharge, according to optimum baseline/ability  Outcome: Progressing  Goal: Interact with staff daily  Description: Interventions:  - Assess and re-assess patient's level of orientation  - Engage patient in 1 on 1 interactions, daily, for a minimum of 15 minutes   - Establish rapport/trust with patient   Outcome: Progressing  Goal: Express concerns related to confused thinking related to:  Description: Interventions:  - Encourage patient to express feelings, fears, frustrations, hopes  - Assign consistent caregivers   - [place]/re-orient patient as needed  - Allow comfort items, as  appropriate  - Provide visual cues, signs, etc.   Outcome: Progressing  Goal: Allow medical examinations, as recommended  Description: Interventions:  - Provide physical/neurological exams and/or referrals, per provider   Outcome: Progressing  Goal: Cooperate with recommended testing/procedures  Description: Interventions:  - Determine need for ancillary testing  - Observe for mental status changes  - Implement falls/precaution protocol   Outcome: Progressing  Goal: Attend and participate in unit activities, including therapeutic, recreational, and educational groups  Description: Interventions:  - Provide therapeutic and educational activities daily, encourage attendance and participation, and document same in the medical record   - Provide appropriate opportunities for reminiscence   - Provide a consistent daily routine   - Encourage family contact/visitation   Outcome: Progressing  Goal: Complete daily ADLs, including personal hygiene independently, as able  Description: Interventions:  - Observe, teach, and assist patient with ADLS  Outcome: Progressing     Problem: DISCHARGE PLANNING - CARE MANAGEMENT  Goal: Discharge to post-acute care or home with appropriate resources  Description: INTERVENTIONS:  - Conduct assessment to determine patient/family and health care team treatment goals, and need for post-acute services based on payer coverage, community resources, and patient preferences, and barriers to discharge  - Address psychosocial, clinical, and financial barriers to discharge as identified in assessment in conjunction with the patient/family and health care team  - Arrange appropriate level of post-acute services according to patient's   needs and preference and payer coverage in collaboration with the physician and health care team  - Communicate with and update the patient/family, physician, and health care team regarding progress on the discharge plan  - Arrange appropriate transportation to  post-acute venues  Outcome: Progressing     Problem: Electroconvulsive therapy (ECT)  Goal: Treatment Goal: Demonstrate a reduction of confusion and improved orientation to person, place, time and/or situation upon discharge, according to optimum baseline/ability  Outcome: Progressing  Goal: Verbalizes/displays adequate comfort level or baseline comfort level  Description: Interventions:  - Encourage patient to monitor pain and request assistance  - Assess pain using appropriate pain scale  - Administer analgesics based on type and severity of pain and evaluate response  - Implement non-pharmacological measures as appropriate and evaluate response  - Consider cultural and social influences on pain and pain management  - Notify physician/advanced practitioner if interventions unsuccessful or patient reports new pain  Outcome: Progressing  Goal: Achieves stable or improved neurological status  Description: INTERVENTIONS  - Monitor and report changes in neurological status  - Monitor vital signs such as temperature, blood pressure, glucose, and any other labs ordered   - Initiate measures to prevent increased intracranial pressure  - Monitor for seizure activity and implement precautions if appropriate      Outcome: Progressing  Goal: Achieves maximal functionality and self care  Description: INTERVENTIONS  - Monitor swallowing and airway patency with patient fatigue and changes in neurological status  - Encourage and assist patient to increase activity and self care.   - Encourage visually impaired, hearing impaired and aphasic patients to use assistive/communication devices  Outcome: Progressing  Goal: Maintain or return mobility to safest level of function  Description: INTERVENTIONS:  - Assess patient's ability to carry out ADLs; assess patient's baseline for ADL function and identify physical deficits which impact ability to perform ADLs (bathing, care of mouth/teeth, toileting, grooming, dressing, etc.)  -  Assess/evaluate cause of self-care deficits   - Assess range of motion  - Assess patient's mobility  - Assess patient's need for assistive devices and provide as appropriate  - Encourage maximum independence but intervene and supervise when necessary  - Involve family in performance of ADLs  - Assess for home care needs following discharge   - Consider OT consult to assist with ADL evaluation and planning for discharge  - Provide patient education as appropriate  Outcome: Progressing  Goal: Absence of urinary retention  Description: INTERVENTIONS:  - Assess patient’s ability to void and empty bladder  - Monitor I/O  - Bladder scan as needed  - Discuss with physician/AP medications to alleviate retention as needed  - Discuss catheterization for long term situations as appropriate  Outcome: Progressing  Goal: Minimal or absence of nausea and/or vomiting  Description: INTERVENTIONS:  - Administer IV fluids if ordered to ensure adequate hydration  - Maintain NPO status until nausea and vomiting are resolved  - Nasogastric tube if ordered  - Administer ordered antiemetic medications as needed  - Provide nonpharmacologic comfort measures as appropriate  - Advance diet as tolerated, if ordered  - Consider nutrition services referral to assist patient with adequate nutrition and appropriate food choices  Outcome: Progressing  Goal: Maintains adequate nutritional intake  Description: INTERVENTIONS:  - Monitor percentage of each meal consumed  - Identify factors contributing to decreased intake, treat as appropriate  - Assist with meals as needed  - Monitor I&O, weight, and lab values if indicated  - Obtain nutrition services referral as needed  Outcome: Progressing

## 2024-09-15 NOTE — PROGRESS NOTES
Psychiatry Progress Note [facility]    Alberto Berumen 28 y.o. male MRN: [id number]  Unit/Bed#: [facility] 108-02 Encounter: [id number]  Code Status: Level 1 - Full Code    PCP: Joce Juan MD    Date of Admission:  3/29/2024 2008   Date of Service:  09/14/24    Patient Active Problem List   Diagnosis    GERD (gastroesophageal reflux disease)    Medical clearance for psychiatric admission    Schizoaffective disorder, bipolar type (HCC)    Tobacco abuse    T wave inversion in EKG    Syringoma    Chronic idiopathic constipation    Vitamin B 12 deficiency    Vitamin D deficiency    Confluent and reticulate papillomatosis    Class 2 obesity in adult    Primary hypertension    Elevated hemoglobin A1c    Bilateral lower extremity edema         Review of systems: No symptoms reported  Psychiatric Diagnosis: Schizoaffective Disorder, Bipolar Type     Assessment  Overall Status:   Patient reports stable mood with less auditory command hallucinations.   Certification Statement: The patient will continue to require additional inpatient hospital stay due to continued psychotic and severe symptoms of depression.      Assessment & Plan  Schizoaffective disorder, bipolar type (HCC)  Schizoaffective disorder, bipolar type  Continues to have depressive symptoms as well as hearing frequent, threatening auditory hallucinations.   Continues to have paranoid delusions and a blunted affect.  Appears to be making small improvements with his symptoms    Continue aripiprazole 15 mg tablet PO daily  Continue clozapine 200 mg tablet PO before dinner  Continue clozapine 300 mg tablet PO HS  Continue escitalopram 20 mg tablet PO daily  Continue senna-docusate sodium two 50 mg tablets PO HS  Continue atropine 1% solution SL HS  Tobacco abuse    GERD (gastroesophageal reflux disease)  Continue famotidine 20 mg tablet BID per medical team  Continue glycopyrrolate 1 mg tablet BID per medical team  Continue pantoprazole EC 10 gm  "table po daily in the morning per medical team.    Primary hypertension  Continue amlodipine 5 mg tablet po daily per medical team  Continue hydrochlorothiazide 12.5 mg tablet po daily per medical team  Continue propranolol 10 mg tablet po Q 12 hours per medical team  Elevated hemoglobin A1c  Continue metformin 500 mg tablet po BID per medical team  Chronic idiopathic constipation  Continue senna docusate sodium two 50 mg tablet po HS   Class 2 obesity in adult           Medications:   Abilify 15 mg po daily, clozaril 200 mg po at dinner and 300 mg po HS, B12 1,000 mg po daily, lexapro 20 mg po daily, glycopyrrolate 1 mg BID and 2 mg po HS, propranolol 10 mg po BID, senna docusate sodium, 2 tablets po HS.  Side effects from treatment: Reported drooling at night again and stated that he did not receive his atropine drops that were scheduled.   Medication changes   None   Medication education  Risks side effects benefits and precautions of medications discussed with patient and he did verbalize an understanding about risks for metabolic syndrome from being on neuroleptics and is form tardive dyskinesia etc.  All medications reviewed and I recommend that they be continued for symptom management   Understanding of medications: Verbalized    Justification for dual anti-psychotics: Inability to control psychotic symptoms.     Non-pharmacological treatments  Continue with individual, group, milieu and occupational therapy using recovery principles and psycho-education about accepting illness and the need for treatment.  Last session of ECT rescheduled for next week.    Safety  Safety and communication plan established to target dynamic risk factors discussed above.    Discharge Plan   Group home vs aunt's house    Interval Progress     Per nursing, patient has been compliant and medications and meals. He is visible in the milieu and interacts appropriately with staff and select peers. Patient reports AH to \"kill himself " "and his family.\"  Upon approach, patient was socializing with peers playing [employer]. He was calm and cooperative. Patient he slept well and appetite is good. He endorses AH but denies command hallucinations to hurt self or others, \"not lately.\" Patient denies VH. Mood is stable. He reports side effects of should twitching. Patient denies si/hi.     Acceptance by patient: Yes   Hopefulness in recovery: Hoping to live with aunt   Involved in reintegration process: Aunt    Trusting in relationship with psychiatrist: Yes  Sleep: Good, approximately 7 hours, feels rested  Appetite: Good  Compliance with Medications: Good  Group attendance: 5/10 groups  Significant events: Reported that he is hearing auditory hallucinations less and since then has seen an improvement in his depressive symptoms.       Mental Status Exam  Appearance: marginal hygiene, dressed in hospital attire, looks stated age, overweight, good eye contact  Behavior: cooperative, calm, laying in bed  Speech: normal rate, normal volume, normal pitch, scant  Mood: depressed, dysphoric  Affect:  depressed, blunted, mood congruent  Thought Process: organized, linear  Thought Content: paranoid ideation, stating that he feels the voices are out to get him although reported that he is hearing them less today. He denies any active suicidal or homicidal thoughts, plans, or intents . He denies any obsessions, compulsions, or distorted body perceptions. He denies any other experiences that would be categorized as grandiose, somatic, or bizarre.   Perceptual Disturbances: no visual hallucinations, auditory hallucinations telling him they cannot wait for him to get out of the hospital so they can shoot him and his family. He reported they sometimes sound like his ex girlfriend and at other times sound like strangers. He reported they are occurring less frequently, but still at all times throughout the day but, does not appear responding to internal stimuli  Hx Risk " Factors: chronic psychiatric problems, chronic depressive symptoms, chronic psychotic symptoms  Sensorium: alert and oriented to person, place, time and situation  Cognition: recent and remote memory grossly intact  Consciousness: alert and awake  Attention: attention span and concentration are age appropriate  Intellect: appears to be of average intelligence  Insight: limited  Judgement: impaired  Motor Activity: no abnormal movements     Vitals  Temp:  [97.6 °F (36.4 °C)-97.8 °F (36.6 °C)] 97.8 °F (36.6 °C)  HR:  [77-98] 91  Resp:  [18-19] 19  BP: (122-136)/(72-89) 135/89  SpO2:  [98 %-99 %] 99 %  No intake or output data in the 24 hours ending 09/14/24 5481    Lab Results: All Labs For Current Hospital Admission Reviewed    Current Facility-Administered Medications   Medication Dose Route Frequency Provider Last Rate    acetaminophen  650 mg Oral Q4H PRN Jordan C Holter,       acetaminophen  650 mg Oral Q6H PRN HOLLI Lion      aluminum-magnesium hydroxide-simethicone  30 mL Oral Q4H PRN Jordan C Holter, DO      amLODIPine  5 mg Oral Daily HOLLI Lion      ARIPiprazole  15 mg Oral Daily Bora Rosario MD      Artificial Tears  1 drop Both Eyes Q3H PRN Jordan C Holter, DO      atropine  1 drop Sublingual  Harjeet Torres,       haloperidol lactate  2.5 mg Intramuscular Q4H PRN Max 4/day STEVE LionNP      And    LORazepam  1 mg Intramuscular Q4H PRN Max 4/day HOLLI Lion      And    benztropine  0.5 mg Intramuscular Q4H PRN Max 4/day HOLLI Lion      benztropine  1 mg Intramuscular Q4H PRN Max 6/day Jordan C Holter, DO      haloperidol lactate  5 mg Intramuscular Q4H PRN Max 4/day HOLLI Lion      And    LORazepam  2 mg Intramuscular Q4H PRN Max 4/day HOLLI Lion      And    benztropine  1 mg Intramuscular Q4H PRN Max 4/day HOLLI Lion      benztropine  1 mg Oral Q4H PRN Max 6/day HOLLI Lion      benztropine  1 mg Oral Q4H PRN Max 6/day Ion FISCHER  Holter, DO      bisacodyl  10 mg Rectal Daily PRN HOLLI Lion      calcium carbonate  500 mg Oral BID PRN HOLLI Monge      cloZAPine  200 mg Oral Before Dinner Bora Rosario MD      cloZAPine  300 mg Oral HS Bora Rosario MD      cyanocobalamin  1,000 mcg Oral Daily Pia Mantilla, HOLLI      hydrOXYzine HCL  50 mg Oral Q6H PRN Max 4/day [facility] Holter, DO      Or    diphenhydrAMINE  50 mg Intramuscular Q6H PRN [facility] Holter, DO      hydrOXYzine HCL  50 mg Oral Q6H PRN Max 4/day HOLLI Lion      Or    diphenhydrAMINE  50 mg Intramuscular Q6H PRN HOLLI Lion      diphenhydrAMINE-zinc acetate   Topical BID PRN HOLLI Lion      escitalopram  20 mg Oral Daily HOLLI Lion      famotidine  20 mg Oral BID HOLLI Monge      fluticasone  1 spray Each Nare Daily Brina Guillen MD      glycopyrrolate  1 mg Oral BID (AM & Afternoon) Bora Rosario MD      glycopyrrolate  2 mg Oral HS Bora Rosario MD      haloperidol  1 mg Oral Q6H PRN HOLLI Lion      haloperidol  2.5 mg Oral Q4H PRN Max 4/day HOLLI Lion      haloperidol  5 mg Oral Q4H PRN Max 4/day HOLLI Lion      hydroCHLOROthiazide  12.5 mg Oral Daily Pia Mantilla, HOLLI      hydrocortisone   Topical 4x Daily PRN HOLLI Lion      hydrOXYzine HCL  100 mg Oral Q6H PRN Max 4/day [facility] Holter, DO      Or    LORazepam  2 mg Intramuscular Q6H PRN [facility] Holter, DO      hydrOXYzine HCL  100 mg Oral Q6H PRN Max 4/day HOLLI Lion      Or    LORazepam  2 mg Intramuscular Q6H PRN HOLLI Lion      hydrOXYzine HCL  25 mg Oral Q6H PRN Max 4/day [facility] Holter, DO      ibuprofen  600 mg Oral Q8H PRN HOLLI Lion      lactated ringers  50 mL/hr Intravenous Continuous Antwan Dong MD      loratadine  10 mg Oral Daily Brina Guillen MD      melatonin  3 mg Oral HS PRN Bora Rosario MD      metFORMIN  500 mg Oral BID With Meals HOLLI Galvan       methocarbamol  500 mg Oral Q6H PRN HOLLI Lion      nicotine polacrilex  2 mg Oral Q4H PRN Bora Rosario MD      OLANZapine  5 mg Oral Q4H PRN Max 3/day [facility] Holter, DO      Or    OLANZapine  2.5 mg Intramuscular Q4H PRN Max 3/day [facility] Holter, DO      OLANZapine  5 mg Oral Q3H PRN Max 3/day [facility] Holter, DO      Or    OLANZapine  5 mg Intramuscular Q3H PRN Max 3/day [facility] Holter, DO      OLANZapine  2.5 mg Oral Q4H PRN Max 6/day [facility] Holter, DO      ondansetron  4 mg Oral Q6H PRN HOLLI Lion      pantoprazole  20 mg Oral QAM [facility] Holter, DO      polyethylene glycol  17 g Oral Daily PRN HOLLI Ghotra      propranolol  10 mg Oral Q12H KAYLIE HOLLI Lion      senna-docusate sodium  1 tablet Oral Daily PRN [facility] Holter, DO      senna-docusate sodium  2 tablet Oral HS Melvina Ambron, DO      traZODone  50 mg Oral HS PRN HOLLI Lion      white petrolatum-mineral oil   Topical TID PRN HOLLI Lion         Counseling / Coordination of Care: Total floor / unit time spent today 15 minutes. Greater than 50% of total time was spent with the patient and / or family counseling and / or somewhat receptive to supportive listening and teaching positive coping skills to deal with symptom mangement.     Patient's Rights, confidentiality and exceptions to confidentiality, use of automated medical record, [facility] staff access to medical record, and consent to treatment reviewed.      HOLLI Hilario

## 2024-09-16 PROCEDURE — 99232 SBSQ HOSP IP/OBS MODERATE 35: CPT | Performed by: PSYCHIATRY & NEUROLOGY

## 2024-09-16 RX ADMIN — FAMOTIDINE 20 MG: 20 TABLET ORAL at 17:19

## 2024-09-16 RX ADMIN — HYDROCHLOROTHIAZIDE 12.5 MG: 12.5 TABLET ORAL at 08:32

## 2024-09-16 RX ADMIN — GLYCOPYRROLATE 2 MG: 1 TABLET ORAL at 21:09

## 2024-09-16 RX ADMIN — FAMOTIDINE 20 MG: 20 TABLET ORAL at 08:31

## 2024-09-16 RX ADMIN — ATROPINE SULFATE 1 DROP: 10 SOLUTION/ DROPS OPHTHALMIC at 21:11

## 2024-09-16 RX ADMIN — ESCITALOPRAM OXALATE 20 MG: 10 TABLET, FILM COATED ORAL at 08:31

## 2024-09-16 RX ADMIN — CLOZAPINE 200 MG: 200 TABLET ORAL at 17:19

## 2024-09-16 RX ADMIN — PANTOPRAZOLE SODIUM 20 MG: 20 TABLET, DELAYED RELEASE ORAL at 08:30

## 2024-09-16 RX ADMIN — METFORMIN HYDROCHLORIDE 500 MG: 500 TABLET, FILM COATED ORAL at 17:19

## 2024-09-16 RX ADMIN — PROPRANOLOL HYDROCHLORIDE 10 MG: 10 TABLET ORAL at 08:32

## 2024-09-16 RX ADMIN — PROPRANOLOL HYDROCHLORIDE 10 MG: 10 TABLET ORAL at 21:09

## 2024-09-16 RX ADMIN — CLOZAPINE 300 MG: 200 TABLET ORAL at 21:09

## 2024-09-16 RX ADMIN — ARIPIPRAZOLE 15 MG: 15 TABLET ORAL at 08:32

## 2024-09-16 RX ADMIN — NICOTINE POLACRILEX 2 MG: 2 GUM, CHEWING ORAL at 08:33

## 2024-09-16 RX ADMIN — GLYCOPYRROLATE 1 MG: 1 TABLET ORAL at 14:42

## 2024-09-16 RX ADMIN — CYANOCOBALAMIN TAB 1000 MCG 1000 MCG: 1000 TAB at 08:31

## 2024-09-16 RX ADMIN — GLYCOPYRROLATE 1 MG: 1 TABLET ORAL at 08:31

## 2024-09-16 RX ADMIN — NICOTINE POLACRILEX 2 MG: 2 GUM, CHEWING ORAL at 17:33

## 2024-09-16 RX ADMIN — SENNOSIDES AND DOCUSATE SODIUM 2 TABLET: 8.6; 5 TABLET ORAL at 21:09

## 2024-09-16 RX ADMIN — NICOTINE POLACRILEX 2 MG: 2 GUM, CHEWING ORAL at 21:09

## 2024-09-16 RX ADMIN — METFORMIN HYDROCHLORIDE 500 MG: 500 TABLET, FILM COATED ORAL at 08:31

## 2024-09-16 RX ADMIN — LORATADINE 10 MG: 10 TABLET ORAL at 08:31
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
## 2024-09-16 NOTE — NURSING NOTE
Alberto is calm with a depressed and blunted affect, appears apathetic. Reports ongoing AH of voices saying he and his family are going to die. Expresses desire to leave hospital but remains fearful of the paranoia related to the voices. Says he wants to make sure he feels ready when he leaves so that he does not have to return to a hospitalized setting. He denies SI/HI/VH. Withdrawn to room after breakfast, no unmet needs currently.

## 2024-09-16 NOTE — PLAN OF CARE
Problem: Alteration in Thoughts and Perception  Goal: Treatment Goal: Gain control of psychotic behaviors/thinking, reduce/eliminate presenting symptoms and demonstrate improved reality functioning upon discharge  Outcome: Progressing  Goal: Verbalize thoughts and feelings  Description: Interventions:  - Promote a nonjudgmental and trusting relationship with the patient through active listening and therapeutic communication  - Assess patient's level of functioning, behavior and potential for risk  - Engage patient in 1 on 1 interactions  - Encourage patient to express fears, feelings, frustrations, and discuss symptoms    - [place] patient to reality, help patient recognize reality-based thinking   - Administer medications as ordered and assess for potential side effects  - Provide the patient education related to the signs and symptoms of the illness and desired effects of prescribed medications  Outcome: Progressing  Goal: Refrain from acting on delusional thinking/internal stimuli  Description: Interventions:  - Monitor patient closely, per order   - Utilize least restrictive measures   - Set reasonable limits, give positive feedback for acceptable   - Administer medications as ordered and monitor of potential side effects  Outcome: Progressing  Goal: Agree to be compliant with medication regime, as prescribed and report medication side effects  Description: Interventions:  - Offer appropriate PRN medication and supervise ingestion; conduct AIMS, as needed   Outcome: Progressing  Goal: Attend and participate in unit activities, including therapeutic, recreational, and educational groups  Description: Interventions:  -Encourage Visitation and family involvement in care  Outcome: Progressing  Goal: Recognize dysfunctional thoughts, communicate reality-based thoughts at the time of discharge  Description: Interventions:  - Provide medication and psycho-education to assist patient in compliance and developing  insight into his/her illness   Outcome: Progressing  Goal: Complete daily ADLs, including personal hygiene independently, as able  Description: Interventions:  - Observe, teach, and assist patient with ADLS  - Monitor and promote a balance of rest/activity, with adequate nutrition and elimination   Outcome: Progressing     Problem: Ineffective Coping  Goal: Identifies ineffective coping skills  Outcome: Progressing  Goal: Identifies healthy coping skills  Outcome: Progressing  Goal: Demonstrates healthy coping skills  Outcome: Progressing  Goal: Participates in unit activities  Description: Interventions:  - Provide therapeutic environment   - Provide required programming   - Redirect inappropriate behaviors   Outcome: Progressing  Goal: Patient/Family participate in treatment and DC plans  Description: Interventions:  - Provide therapeutic environment  Outcome: Progressing  Goal: Patient/Family verbalizes awareness of resources  Outcome: Progressing     Problem: Depression  Goal: Treatment Goal: Demonstrate behavioral control of depressive symptoms, verbalize feelings of improved mood/affect, and adopt new coping skills prior to discharge  Outcome: Progressing  Goal: Verbalize thoughts and feelings  Description: Interventions:  - Assess and re-assess patient's level of risk   - Engage patient in 1:1 interactions, daily, for a minimum of 15 minutes   - Encourage patient to express feelings, fears, frustrations, hopes   Outcome: Progressing  Goal: Refrain from harming self  Description: Interventions:  - Monitor patient closely, per order   - Supervise medication ingestion, monitor effects and side effects   Outcome: Progressing  Goal: Refrain from isolation  Description: Interventions:  - Develop a trusting relationship   - Encourage socialization   Outcome: Progressing  Goal: Refrain from self-neglect  Outcome: Progressing  Goal: Attend and participate in unit activities, including therapeutic, recreational, and  educational groups  Description: Interventions:  - Provide therapeutic and educational activities daily, encourage attendance and participation, and document same in the medical record   Outcome: Progressing  Goal: Complete daily ADLs, including personal hygiene independently, as able  Description: Interventions:  - Observe, teach, and assist patient with ADLS  -  Monitor and promote a balance of rest/activity, with adequate nutrition and elimination   Outcome: Progressing     Problem: Anxiety  Goal: Anxiety is at manageable level  Description: Interventions:  - Assess and monitor patient's anxiety level.   - Monitor for signs and symptoms (heart palpitations, chest pain, shortness of breath, headaches, nausea, feeling jumpy, restlessness, irritable, apprehensive).   - Collaborate with interdisciplinary team and initiate plan and interventions as ordered.  - [place] patient to unit/surroundings  - Explain treatment plan  - Encourage participation in care  - Encourage verbalization of concerns/fears  - Identify coping mechanisms  - Assist in developing anxiety-reducing skills  - Administer/offer alternative therapies  - Limit or eliminate stimulants  Outcome: Progressing     Problem: Alteration in Orientation  Goal: Treatment Goal: Demonstrate a reduction of confusion and improved orientation to person, place, time and/or situation upon discharge, according to optimum baseline/ability  Outcome: Progressing  Goal: Interact with staff daily  Description: Interventions:  - Assess and re-assess patient's level of orientation  - Engage patient in 1 on 1 interactions, daily, for a minimum of 15 minutes   - Establish rapport/trust with patient   Outcome: Progressing  Goal: Express concerns related to confused thinking related to:  Description: Interventions:  - Encourage patient to express feelings, fears, frustrations, hopes  - Assign consistent caregivers   - [place]/re-orient patient as needed  - Allow comfort items, as  appropriate  - Provide visual cues, signs, etc.   Outcome: Progressing  Goal: Allow medical examinations, as recommended  Description: Interventions:  - Provide physical/neurological exams and/or referrals, per provider   Outcome: Progressing  Goal: Cooperate with recommended testing/procedures  Description: Interventions:  - Determine need for ancillary testing  - Observe for mental status changes  - Implement falls/precaution protocol   Outcome: Progressing  Goal: Attend and participate in unit activities, including therapeutic, recreational, and educational groups  Description: Interventions:  - Provide therapeutic and educational activities daily, encourage attendance and participation, and document same in the medical record   - Provide appropriate opportunities for reminiscence   - Provide a consistent daily routine   - Encourage family contact/visitation   Outcome: Progressing  Goal: Complete daily ADLs, including personal hygiene independently, as able  Description: Interventions:  - Observe, teach, and assist patient with ADLS  Outcome: Progressing     Problem: DISCHARGE PLANNING - CARE MANAGEMENT  Goal: Discharge to post-acute care or home with appropriate resources  Description: INTERVENTIONS:  - Conduct assessment to determine patient/family and health care team treatment goals, and need for post-acute services based on payer coverage, community resources, and patient preferences, and barriers to discharge  - Address psychosocial, clinical, and financial barriers to discharge as identified in assessment in conjunction with the patient/family and health care team  - Arrange appropriate level of post-acute services according to patient's   needs and preference and payer coverage in collaboration with the physician and health care team  - Communicate with and update the patient/family, physician, and health care team regarding progress on the discharge plan  - Arrange appropriate transportation to  post-acute venues  Outcome: Progressing     Problem: Electroconvulsive therapy (ECT)  Goal: Treatment Goal: Demonstrate a reduction of confusion and improved orientation to person, place, time and/or situation upon discharge, according to optimum baseline/ability  Outcome: Progressing  Goal: Verbalizes/displays adequate comfort level or baseline comfort level  Description: Interventions:  - Encourage patient to monitor pain and request assistance  - Assess pain using appropriate pain scale  - Administer analgesics based on type and severity of pain and evaluate response  - Implement non-pharmacological measures as appropriate and evaluate response  - Consider cultural and social influences on pain and pain management  - Notify physician/advanced practitioner if interventions unsuccessful or patient reports new pain  Outcome: Progressing  Goal: Achieves stable or improved neurological status  Description: INTERVENTIONS  - Monitor and report changes in neurological status  - Monitor vital signs such as temperature, blood pressure, glucose, and any other labs ordered   - Initiate measures to prevent increased intracranial pressure  - Monitor for seizure activity and implement precautions if appropriate      Outcome: Progressing  Goal: Achieves maximal functionality and self care  Description: INTERVENTIONS  - Monitor swallowing and airway patency with patient fatigue and changes in neurological status  - Encourage and assist patient to increase activity and self care.   - Encourage visually impaired, hearing impaired and aphasic patients to use assistive/communication devices  Outcome: Progressing  Goal: Maintain or return mobility to safest level of function  Description: INTERVENTIONS:  - Assess patient's ability to carry out ADLs; assess patient's baseline for ADL function and identify physical deficits which impact ability to perform ADLs (bathing, care of mouth/teeth, toileting, grooming, dressing, etc.)  -  Assess/evaluate cause of self-care deficits   - Assess range of motion  - Assess patient's mobility  - Assess patient's need for assistive devices and provide as appropriate  - Encourage maximum independence but intervene and supervise when necessary  - Involve family in performance of ADLs  - Assess for home care needs following discharge   - Consider OT consult to assist with ADL evaluation and planning for discharge  - Provide patient education as appropriate  Outcome: Progressing  Goal: Absence of urinary retention  Description: INTERVENTIONS:  - Assess patient’s ability to void and empty bladder  - Monitor I/O  - Bladder scan as needed  - Discuss with physician/AP medications to alleviate retention as needed  - Discuss catheterization for long term situations as appropriate  Outcome: Progressing  Goal: Minimal or absence of nausea and/or vomiting  Description: INTERVENTIONS:  - Administer IV fluids if ordered to ensure adequate hydration  - Maintain NPO status until nausea and vomiting are resolved  - Nasogastric tube if ordered  - Administer ordered antiemetic medications as needed  - Provide nonpharmacologic comfort measures as appropriate  - Advance diet as tolerated, if ordered  - Consider nutrition services referral to assist patient with adequate nutrition and appropriate food choices  Outcome: Progressing  Goal: Maintains adequate nutritional intake  Description: INTERVENTIONS:  - Monitor percentage of each meal consumed  - Identify factors contributing to decreased intake, treat as appropriate  - Assist with meals as needed  - Monitor I&O, weight, and lab values if indicated  - Obtain nutrition services referral as needed  Outcome: Progressing

## 2024-09-16 NOTE — PROGRESS NOTES
09/16/24 1212   Team Meeting   Meeting Type Tx Team Meeting   Initial Conference Date 09/16/24   Next Conference Date 10/16/24   Team Members Present   Team Members Present Physician;Nurse;;Other (Discipline and Name)   Physician Team Member Abraham   Nursing Team Member Claudia   Social Work Team Member Jenny   Other (Discipline and Name) Gaby Law - CMPDSGeronimo - [facility]   Patient/Family Present   Patient Present Yes   Patient's Family Present No   OTHER   Team Meeting - Additional Comments  --      Pt completed review of tx plan. Pt reported no questions or concerns. Pt and tx team signed updated plan, copy placed in pt's chart.

## 2024-09-16 NOTE — PLAN OF CARE
Problem: Ineffective Coping  Goal: Identifies ineffective coping skills  Outcome: Not Progressing  Goal: Identifies healthy coping skills  Outcome: Progressing  Goal: Demonstrates healthy coping skills  Outcome: Not Progressing  Goal: Participates in unit activities  Description: Interventions:  - Provide therapeutic environment   - Provide required programming   - Redirect inappropriate behaviors   Outcome: Not Progressing      No

## 2024-09-17 PROCEDURE — 99232 SBSQ HOSP IP/OBS MODERATE 35: CPT | Performed by: PSYCHIATRY & NEUROLOGY

## 2024-09-17 RX ADMIN — ESCITALOPRAM OXALATE 20 MG: 10 TABLET, FILM COATED ORAL at 08:34

## 2024-09-17 RX ADMIN — METFORMIN HYDROCHLORIDE 500 MG: 500 TABLET, FILM COATED ORAL at 17:06

## 2024-09-17 RX ADMIN — PROPRANOLOL HYDROCHLORIDE 10 MG: 10 TABLET ORAL at 08:30

## 2024-09-17 RX ADMIN — CYANOCOBALAMIN TAB 1000 MCG 1000 MCG: 1000 TAB at 08:31

## 2024-09-17 RX ADMIN — FAMOTIDINE 20 MG: 20 TABLET ORAL at 08:32

## 2024-09-17 RX ADMIN — CLOZAPINE 200 MG: 200 TABLET ORAL at 17:05

## 2024-09-17 RX ADMIN — GLYCOPYRROLATE 1 MG: 1 TABLET ORAL at 08:33

## 2024-09-17 RX ADMIN — GLYCOPYRROLATE 2 MG: 1 TABLET ORAL at 21:03

## 2024-09-17 RX ADMIN — ARIPIPRAZOLE 15 MG: 15 TABLET ORAL at 08:33

## 2024-09-17 RX ADMIN — PANTOPRAZOLE SODIUM 20 MG: 20 TABLET, DELAYED RELEASE ORAL at 08:32

## 2024-09-17 RX ADMIN — METFORMIN HYDROCHLORIDE 500 MG: 500 TABLET, FILM COATED ORAL at 08:31

## 2024-09-17 RX ADMIN — NICOTINE POLACRILEX 2 MG: 2 GUM, CHEWING ORAL at 12:46

## 2024-09-17 RX ADMIN — NICOTINE POLACRILEX 2 MG: 2 GUM, CHEWING ORAL at 21:04

## 2024-09-17 RX ADMIN — PROPRANOLOL HYDROCHLORIDE 10 MG: 10 TABLET ORAL at 21:03

## 2024-09-17 RX ADMIN — AMLODIPINE BESYLATE 5 MG: 5 TABLET ORAL at 08:33

## 2024-09-17 RX ADMIN — SENNOSIDES AND DOCUSATE SODIUM 2 TABLET: 8.6; 5 TABLET ORAL at 22:13

## 2024-09-17 RX ADMIN — FAMOTIDINE 20 MG: 20 TABLET ORAL at 17:06

## 2024-09-17 RX ADMIN — LORATADINE 10 MG: 10 TABLET ORAL at 08:33

## 2024-09-17 RX ADMIN — NICOTINE POLACRILEX 2 MG: 2 GUM, CHEWING ORAL at 17:06

## 2024-09-17 RX ADMIN — CLOZAPINE 300 MG: 200 TABLET ORAL at 21:04

## 2024-09-17 RX ADMIN — HYDROCHLOROTHIAZIDE 12.5 MG: 12.5 TABLET ORAL at 08:32

## 2024-09-17 RX ADMIN — GLYCOPYRROLATE 1 MG: 1 TABLET ORAL at 14:24

## 2024-09-17 RX ADMIN — NICOTINE POLACRILEX 2 MG: 2 GUM, CHEWING ORAL at 08:32

## 2024-09-17 NOTE — SOCIAL WORK
"SW observed pt sitting in the back lounge area for an extended period of time staring at the wall. SW checked in with pt. Pt reports he's \"doing ok\". Pt denied any immediate concerns. SW encouraged pt to keep busy, go to groups, take care of his tasks for the day, etc to reduce sitting in one space listening to AH. Pt in agreement.   "

## 2024-09-17 NOTE — NURSING NOTE
Pt is calm, cooperative and visible on unit. Pt reports ongoing AH, depression and anxiety; denies SI/HI/VH. Compliant with medications and meals. Social with staff and peers. No inappropriately behaviors during shift. Q 7 min checks maintained.

## 2024-09-17 NOTE — PROGRESS NOTES
"Psychiatry Progress Note [facility]    Alberto Berumen 28 y.o. male MRN: [id number]  Unit/Bed#: [facility]erg[facility] with [facility] 108-02 Encounter: [id number]  Code Status: Level 1 - Full Code    PCP: Joce Juan MD    Date of Admission:  3/29/2024 2008   Date of Service:  09/17/24    Patient Active Problem List   Diagnosis    GERD (gastroesophageal reflux disease)    Medical clearance for psychiatric admission    Schizoaffective disorder, bipolar type (HCC)    Tobacco abuse    T wave inversion in EKG    Syringoma    Chronic idiopathic constipation    Vitamin B 12 deficiency    Vitamin D deficiency    Confluent and reticulate papillomatosis    Class 2 obesity in adult    Primary hypertension    Elevated hemoglobin A1c    Bilateral lower extremity edema         Review of systems: No symptoms reported  Psychiatric Diagnosis: Schizoaffective Disorder, Bipolar Type     Assessment  Overall Status: Reported suicidal ideations last night while he was trying to sleep. He did not report any plan and denies have any thoughts about suicide now. He reported that the voices did not tell him to and that these thoughts were his own. He reported difficulty falling asleep last night and stated he fell asleep around 1 am and slept until around breakfast. He reported that his mood was \"iffy\" because of the voices. He continues to state that the voices come and go.  Certification Statement: The patient will continue to require additional inpatient hospital stay due to continued psychotic and severe symptoms of depression.      Assessment & Plan  Schizoaffective disorder, bipolar type (HCC)  Schizoaffective disorder, bipolar type  Continues to have depressive symptoms as well as hearing frequent, threatening auditory hallucinations.   Continues to have paranoid delusions and a blunted affect.  Appears to be making small improvements with his symptoms    Continue aripiprazole 15 mg tablet PO daily  Continue clozapine 200 mg tablet PO before " dinner  Continue clozapine 300 mg tablet PO HS  Continue escitalopram 20 mg tablet PO daily  Continue senna-docusate sodium two 50 mg tablets PO HS  Continue atropine 1% solution SL HS  Tobacco abuse    GERD (gastroesophageal reflux disease)  Continue famotidine 20 mg tablet BID per medical team  Continue glycopyrrolate 1 mg tablet BID per medical team  Continue pantoprazole EC 10 gm table po daily in the morning per medical team.    Primary hypertension  Continue amlodipine 5 mg tablet po daily per medical team  Continue hydrochlorothiazide 12.5 mg tablet po daily per medical team  Continue propranolol 10 mg tablet po Q 12 hours per medical team  Elevated hemoglobin A1c  Continue metformin 500 mg tablet po BID per medical team  Chronic idiopathic constipation  Continue senna docusate sodium two 50 mg tablet po HS   Class 2 obesity in adult           Medications:   Abilify 15 mg po daily, clozaril 200 mg po at dinner and 300 mg po HS, B12 1,000 mg po daily, lexapro 20 mg po daily, glycopyrrolate 1 mg BID and 2 mg po HS, propranolol 10 mg po BID, senna docusate sodium, 2 tablets po HS  Side effects from treatment: Reported continued drooling despite atropine drops at night, but stated that drooling has improved   Medication changes   None  Medication education  Risks side effects benefits and precautions of medications discussed with patient and he did verbalize an understanding about risks for metabolic syndrome from being on neuroleptics and is form tardive dyskinesia etc.  All medications reviewed and I recommend that they be continued for symptom management  Understanding of medications: Verbalized   Justification for dual anti-psychotics: Inability to control psychotic symptoms     Non-pharmacological treatments  Continue with individual, group, milieu and occupational therapy using recovery principles and psycho-education about accepting illness and the need for treatment.  Last session of ECT Wednesday  "9/18    Safety  Safety and communication plan established to target dynamic risk factors discussed above.    Discharge Plan   Aunt's house vs group home    Interval Progress   Patient reported having suicidal ideations when he was trying to sleep last night. He denied any plan or intent. He stated that nothing happened prior to the thoughts and said they just occurred. He denied any voices telling him to hurt himself and stated he is no longer having these thoughts. When asked about how he could improve his mood and help avoid these thoughts he replied \"I don't know\" He denied any homicidal thoughts, intents, or plan. He reported he felt \"iffy\" because of the voices and stated he was feeling a little bit sad today. He reported having issues falling asleep last night and was not able to sleep until 1 am. He stated that he planned on attending some groups today. He denied any side effects from his medications. He continues to state the threatening voices \"come and go\".  Acceptance by patient: Yes   Hopefulness in recovery: Hoping to live with aunt   Involved in reintegration process: Aunt   Trusting in relationship with psychiatrist: Yes  Sleep: Okay, approximately 7 hours but stated he had trouble falling asleep  Appetite: Good  Compliance with Medications: Good  Group attendance: 5/8  Significant events: Admitting to having suicidal ideation without a plan before bed last night. Denies having these thoughts today.       Mental Status Exam  Appearance: age appropriate, overweight, wearing a restrepo, wearing a personal sweatshirt and hospital scrub pants. Good eye contact  Behavior: cooperative, calm  Speech: normal rate, normal pitch, scant, decreased volume  Mood: depressed  Affect:  depressed, flat, mood congruent  Thought Process: organized, linear  Thought Content: paranoid ideation, stating that he feels the voices are out to get him although he reports feeling safe in the hospital. He noted that the auditory " "hallucinations are improving since admission but that they still \"come and go\". He reported suicidal thoughts last night but denied a plan or intent. He denied any homicidal thoughts, plans, or intents. No obsessions, compulsions, or distorted perceptions verbalized. He denies any other experiences that would be categorized as grandiose, somatic, or bizarre delusions.   Perceptual Disturbances: auditory hallucinations that tell him they are going to kill him and his family. He reports that they \"come and go\" and that sometimes they sound like his ex girlfriend while other times they sound like strangers to him., denies visual hallucinations when asked, does not appear responding to internal stimuli  Hx Risk Factors: chronic psychiatric problems, chronic depressive symptoms, chronic psychotic symptoms  Sensorium: alert and oriented to person, place, time and situation  Cognition: recent and remote memory grossly intact  Consciousness: alert and awake  Attention: attention span and concentration are age appropriate  Intellect: appears to be of average intelligence  Insight: limited  Judgement: impaired  Motor Activity: no abnormal movements     Vitals  Temp:  [97.8 °F (36.6 °C)-98.2 °F (36.8 °C)] 98.2 °F (36.8 °C)  HR:  [101-103] 103  Resp:  [16-18] 18  BP: (122-138)/(65-78) 122/78  SpO2:  [98 %-100 %] 98 %  No intake or output data in the 24 hours ending 09/17/24 0923    Lab Results: All Labs For Current Hospital Admission Reviewed    Current Facility-Administered Medications   Medication Dose Route Frequency Provider Last Rate    acetaminophen  650 mg Oral Q4H PRN Jordan C Holter, DO      acetaminophen  650 mg Oral Q6H PRN HOLLI Lion      aluminum-magnesium hydroxide-simethicone  30 mL Oral Q4H PRN Jordan C Holter, DO      amLODIPine  5 mg Oral Daily HOLLI Lion      ARIPiprazole  15 mg Oral Daily Bora Rosario MD      Artificial Tears  1 drop Both Eyes Q3H PRN Jordan C Holter, DO      atropine  1 " drop Sublingual HS Harjeet Torres, DO      haloperidol lactate  2.5 mg Intramuscular Q4H PRN Max 4/day STEVE LionNP      And    LORazepam  1 mg Intramuscular Q4H PRN Max 4/day STEVE LionNP      And    benztropine  0.5 mg Intramuscular Q4H PRN Max 4/day Evelinevipul Hunt CRNP      benztropine  1 mg Intramuscular Q4H PRN Max 6/day Jordan C Holter, DO      haloperidol lactate  5 mg Intramuscular Q4H PRN Max 4/day Evelinevipul Hunt CRNP      And    LORazepam  2 mg Intramuscular Q4H PRN Max 4/day Evelinevipul Hunt, CRNP      And    benztropine  1 mg Intramuscular Q4H PRN Max 4/day HOLLI Lion      benztropine  1 mg Oral Q4H PRN Max 6/day Eveline Hunt CRJENNIE      benztropine  1 mg Oral Q4H PRN Max 6/day Jordan C Holter, DO      bisacodyl  10 mg Rectal Daily PRN HOLLI Lion      calcium carbonate  500 mg Oral BID PRN HOLLI Monge      cloZAPine  200 mg Oral Before Dinner Bora Rosario MD      cloZAPine  300 mg Oral HS Bora Rosario MD      cyanocobalamin  1,000 mcg Oral Daily HOLLI Galvan      hydrOXYzine HCL  50 mg Oral Q6H PRN Max 4/day Jordan C Holter, DO      Or    diphenhydrAMINE  50 mg Intramuscular Q6H PRN Jordan C Holter, DO      hydrOXYzine HCL  50 mg Oral Q6H PRN Max 4/day HOLLI Lion      Or    diphenhydrAMINE  50 mg Intramuscular Q6H PRN HOLLI Lion      diphenhydrAMINE-zinc acetate   Topical BID PRN HOLLI Lion      escitalopram  20 mg Oral Daily HOLLI Lion      famotidine  20 mg Oral BID HOLLI Monge      fluticasone  1 spray Each Nare Daily Brina Guillen MD      glycopyrrolate  1 mg Oral BID (AM & Afternoon) Bora Rosario MD      glycopyrrolate  2 mg Oral HS Bora Rosario MD      haloperidol  1 mg Oral Q6H PRN HOLLI Lion      haloperidol  2.5 mg Oral Q4H PRN Max 4/day HOLLI Lion      haloperidol  5 mg Oral Q4H PRN Max 4/day HOLLI Lion      hydroCHLOROthiazide  12.5 mg Oral Daily Pia Mantilla,  HOLLI      hydrocortisone   Topical 4x Daily PRN HOLLI Lion      hydrOXYzine HCL  100 mg Oral Q6H PRN Max 4/day [facility] Holter, DO      Or    LORazepam  2 mg Intramuscular Q6H PRN [facility] Holter, DO      hydrOXYzine HCL  100 mg Oral Q6H PRN Max 4/day HOLLI Lion      Or    LORazepam  2 mg Intramuscular Q6H PRN HOLLI Lion      hydrOXYzine HCL  25 mg Oral Q6H PRN Max 4/day [facility] Holter, DO      ibuprofen  600 mg Oral Q8H PRN HOLLI Lion      lactated ringers  50 mL/hr Intravenous Continuous Antwan Dong MD      loratadine  10 mg Oral Daily Brina Guillen MD      melatonin  3 mg Oral HS PRN Bora Rosario MD      metFORMIN  500 mg Oral BID With Meals HOLLI Galvan      methocarbamol  500 mg Oral Q6H PRN HOLLI Lion      nicotine polacrilex  2 mg Oral Q4H PRN Bora Rosario MD      OLANZapine  5 mg Oral Q4H PRN Max 3/day [facility] Holter, DO      Or    OLANZapine  2.5 mg Intramuscular Q4H PRN Max 3/day [facility] Holter, DO      OLANZapine  5 mg Oral Q3H PRN Max 3/day [facility] Holter, DO      Or    OLANZapine  5 mg Intramuscular Q3H PRN Max 3/day [facility] Holter, DO      OLANZapine  2.5 mg Oral Q4H PRN Max 6/day [facility] Holter, DO      ondansetron  4 mg Oral Q6H PRN HOLLI Lion      pantoprazole  [facility]      polyethylene glycol  17 g Oral Daily PRN HOLLI Ghotra      propranolol  10 mg Oral Q12H KAYLIE HOLLI Lion      senna-docusate sodium  1 tablet Oral Daily PRN [facility] Holter, DO      senna-docusate sodium  2 tablet Oral HS Melvina Ambron, DO      traZODone  50 mg Oral HS PRN HOLLI Lion      white petrolatum-mineral oil   Topical TID PRN HOLLI Lion         Counseling / Coordination of Care: Total floor / unit time spent today 15 minutes. Greater than 50% of total time was spent with the patient and / or family counseling and / or somewhat receptive to supportive listening and teaching positive coping  skills to deal with symptom mangement.     Patient's Rights, confidentiality and exceptions to confidentiality, use of automated medical record, [facility] Services staff access to medical record, and consent to treatment reviewed.    This note has been dictated and hence there may be problems with punctuation, spelling and formatting and if anyone has any concerns please address them to the writer.  This note is not shared with patient due to potential for making patient's condition worse by knowing the content of the note.    Eveline WALLACE

## 2024-09-17 NOTE — NURSING NOTE
Alberto has his last ECT tomorrow. He acknowledged this.He ambulated about the unit with female peer (SC) He admits to continued voices which threaten him and his family but he states they do not bother him like they used to.  His behaviors were in control and and appropriate  He was unable to receive his Propranolol at 2100 due to hypotensive parameters

## 2024-09-17 NOTE — PLAN OF CARE
Problem: Alteration in Thoughts and Perception  Goal: Verbalize thoughts and feelings  Description: Interventions:  - Promote a nonjudgmental and trusting relationship with the patient through active listening and therapeutic communication  - Assess patient's level of functioning, behavior and potential for risk  - Engage patient in 1 on 1 interactions  - Encourage patient to express fears, feelings, frustrations, and discuss symptoms    - Adrian patient to reality, help patient recognize reality-based thinking   - Administer medications as ordered and assess for potential side effects  - Provide the patient education related to the signs and symptoms of the illness and desired effects of prescribed medications  Outcome: Progressing  Goal: Refrain from acting on delusional thinking/internal stimuli  Description: Interventions:  - Monitor patient closely, per order   - Utilize least restrictive measures   - Set reasonable limits, give positive feedback for acceptable   - Administer medications as ordered and monitor of potential side effects  Outcome: Progressing  Goal: Agree to be compliant with medication regime, as prescribed and report medication side effects  Description: Interventions:  - Offer appropriate PRN medication and supervise ingestion; conduct AIMS, as needed   Outcome: Progressing  Goal: Attend and participate in unit activities, including therapeutic, recreational, and educational groups  Description: Interventions:  -Encourage Visitation and family involvement in care  Outcome: Progressing  Goal: Recognize dysfunctional thoughts, communicate reality-based thoughts at the time of discharge  Description: Interventions:  - Provide medication and psycho-education to assist patient in compliance and developing insight into his/her illness   Outcome: Progressing  Goal: Complete daily ADLs, including personal hygiene independently, as able  Description: Interventions:  - Observe, teach, and assist  patient with ADLS  - Monitor and promote a balance of rest/activity, with adequate nutrition and elimination   Outcome: Progressing     Problem: Ineffective Coping  Goal: Identifies ineffective coping skills  Outcome: Progressing  Goal: Identifies healthy coping skills  Outcome: Progressing  Goal: Demonstrates healthy coping skills  Outcome: Progressing  Goal: Participates in unit activities  Description: Interventions:  - Provide therapeutic environment   - Provide required programming   - Redirect inappropriate behaviors   Outcome: Progressing     Problem: Depression  Goal: Treatment Goal: Demonstrate behavioral control of depressive symptoms, verbalize feelings of improved mood/affect, and adopt new coping skills prior to discharge  Outcome: Progressing  Goal: Refrain from isolation  Description: Interventions:  - Develop a trusting relationship   - Encourage socialization   Outcome: Progressing     Problem: Anxiety  Goal: Anxiety is at manageable level  Description: Interventions:  - Assess and monitor patient's anxiety level.   - Monitor for signs and symptoms (heart palpitations, chest pain, shortness of breath, headaches, nausea, feeling jumpy, restlessness, irritable, apprehensive).   - Collaborate with interdisciplinary team and initiate plan and interventions as ordered.  - [place] patient to unit/surroundings  - Explain treatment plan  - Encourage participation in care  - Encourage verbalization of concerns/fears  - Identify coping mechanisms  - Assist in developing anxiety-reducing skills  - Administer/offer alternative therapies  - Limit or eliminate stimulants  Outcome: Progressing     Problem: Alteration in Orientation  Goal: Interact with staff daily  Description: Interventions:  - Assess and re-assess patient's level of orientation  - Engage patient in 1 on 1 interactions, daily, for a minimum of 15 minutes   - Establish rapport/trust with patient   Outcome: Progressing     Problem: Alteration in  Thoughts and Perception  Goal: Treatment Goal: Gain control of psychotic behaviors/thinking, reduce/eliminate presenting symptoms and demonstrate improved reality functioning upon discharge  Outcome: Not Progressing

## 2024-09-17 NOTE — PROGRESS NOTES
09/17/24 0736   Team Meeting   Meeting Type Daily Rounds   Team Members Present   Team Members Present Physician;Nurse;;Other (Discipline and Name)   Patient/Family Present   Patient Present No   Patient's Family Present No     In attendance:  Dr. Alex Thomas, MD Dr. Jordan Holter, DO Guy Taylor, RN  Luisana Alvarado, [facility]  Carla Lux, [facility]  Irais Mcdowell, M.S.    Groups: 5/8    Pt last ECT scheduled for tomorrow. Pt boundaries reinforced with female peer by staff. Pt reports ongoing AH.

## 2024-09-18 ENCOUNTER — ANESTHESIA (INPATIENT)
Dept: PREOP/PACU | Facility: HOSPITAL | Age: 29
DRG: 750 | End: 2024-09-18
Payer: COMMERCIAL

## 2024-09-18 ENCOUNTER — ANESTHESIA EVENT (INPATIENT)
Dept: PREOP/PACU | Facility: HOSPITAL | Age: 29
DRG: 750 | End: 2024-09-18
Payer: COMMERCIAL

## 2024-09-18 ENCOUNTER — APPOINTMENT (INPATIENT)
Dept: PREOP/PACU | Facility: HOSPITAL | Age: 29
DRG: 750 | End: 2024-09-18
Attending: PSYCHIATRY & NEUROLOGY
Payer: COMMERCIAL

## 2024-09-18 PROCEDURE — 90870 ELECTROCONVULSIVE THERAPY: CPT

## 2024-09-18 PROCEDURE — 99232 SBSQ HOSP IP/OBS MODERATE 35: CPT | Performed by: PSYCHIATRY & NEUROLOGY

## 2024-09-18 PROCEDURE — 90870 ELECTROCONVULSIVE THERAPY: CPT | Performed by: PSYCHIATRY & NEUROLOGY

## 2024-09-18 PROCEDURE — GZB2ZZZ ELECTROCONVULSIVE THERAPY, BILATERAL-SINGLE SEIZURE: ICD-10-PCS | Performed by: PSYCHIATRY & NEUROLOGY

## 2024-09-18 RX ORDER — SODIUM CHLORIDE, SODIUM LACTATE, POTASSIUM CHLORIDE, CALCIUM CHLORIDE 600; 310; 30; 20 MG/100ML; MG/100ML; MG/100ML; MG/100ML
INJECTION, SOLUTION INTRAVENOUS CONTINUOUS PRN
Status: DISCONTINUED | OUTPATIENT
Start: 2024-09-18 | End: 2024-09-18

## 2024-09-18 RX ORDER — ALBUTEROL SULFATE 0.83 MG/ML
2.5 SOLUTION RESPIRATORY (INHALATION) ONCE AS NEEDED
Status: DISCONTINUED | OUTPATIENT
Start: 2024-09-18 | End: 2024-09-18 | Stop reason: HOSPADM

## 2024-09-18 RX ORDER — SODIUM CHLORIDE, SODIUM LACTATE, POTASSIUM CHLORIDE, CALCIUM CHLORIDE 600; 310; 30; 20 MG/100ML; MG/100ML; MG/100ML; MG/100ML
100 INJECTION, SOLUTION INTRAVENOUS CONTINUOUS
Status: CANCELLED | OUTPATIENT
Start: 2024-09-18

## 2024-09-18 RX ORDER — GLYCOPYRROLATE 0.2 MG/ML
INJECTION INTRAMUSCULAR; INTRAVENOUS AS NEEDED
Status: DISCONTINUED | OUTPATIENT
Start: 2024-09-18 | End: 2024-09-18

## 2024-09-18 RX ORDER — ETOMIDATE 2 MG/ML
INJECTION INTRAVENOUS AS NEEDED
Status: DISCONTINUED | OUTPATIENT
Start: 2024-09-18 | End: 2024-09-18

## 2024-09-18 RX ORDER — LABETALOL HYDROCHLORIDE 5 MG/ML
INJECTION, SOLUTION INTRAVENOUS AS NEEDED
Status: DISCONTINUED | OUTPATIENT
Start: 2024-09-18 | End: 2024-09-18

## 2024-09-18 RX ORDER — SUCCINYLCHOLINE/SOD CL,ISO/PF 100 MG/5ML
SYRINGE (ML) INTRAVENOUS AS NEEDED
Status: DISCONTINUED | OUTPATIENT
Start: 2024-09-18 | End: 2024-09-18

## 2024-09-18 RX ORDER — ONDANSETRON 2 MG/ML
4 INJECTION INTRAMUSCULAR; INTRAVENOUS ONCE AS NEEDED
Status: DISCONTINUED | OUTPATIENT
Start: 2024-09-18 | End: 2024-09-18 | Stop reason: HOSPADM

## 2024-09-18 RX ORDER — ALBUTEROL SULFATE 0.83 MG/ML
2.5 SOLUTION RESPIRATORY (INHALATION) ONCE AS NEEDED
Status: CANCELLED | OUTPATIENT
Start: 2024-09-18

## 2024-09-18 RX ORDER — ONDANSETRON 2 MG/ML
4 INJECTION INTRAMUSCULAR; INTRAVENOUS ONCE AS NEEDED
Status: CANCELLED | OUTPATIENT
Start: 2024-09-18

## 2024-09-18 RX ORDER — HYDRALAZINE HYDROCHLORIDE 20 MG/ML
INJECTION INTRAMUSCULAR; INTRAVENOUS AS NEEDED
Status: DISCONTINUED | OUTPATIENT
Start: 2024-09-18 | End: 2024-09-18

## 2024-09-18 RX ORDER — SODIUM CHLORIDE, SODIUM LACTATE, POTASSIUM CHLORIDE, CALCIUM CHLORIDE 600; 310; 30; 20 MG/100ML; MG/100ML; MG/100ML; MG/100ML
100 INJECTION, SOLUTION INTRAVENOUS CONTINUOUS
Status: DISCONTINUED | OUTPATIENT
Start: 2024-09-18 | End: 2024-09-18

## 2024-09-18 RX ORDER — KETOROLAC TROMETHAMINE 30 MG/ML
INJECTION, SOLUTION INTRAMUSCULAR; INTRAVENOUS AS NEEDED
Status: DISCONTINUED | OUTPATIENT
Start: 2024-09-18 | End: 2024-09-18

## 2024-09-18 RX ADMIN — FAMOTIDINE 20 MG: 20 TABLET ORAL at 17:18

## 2024-09-18 RX ADMIN — GLYCOPYRROLATE 1 MG: 1 TABLET ORAL at 13:02

## 2024-09-18 RX ADMIN — GLYCOPYRROLATE 2 MG: 1 TABLET ORAL at 21:08

## 2024-09-18 RX ADMIN — CLOZAPINE 300 MG: 200 TABLET ORAL at 21:08

## 2024-09-18 RX ADMIN — METFORMIN HYDROCHLORIDE 500 MG: 500 TABLET, FILM COATED ORAL at 17:18

## 2024-09-18 RX ADMIN — LABETALOL HYDROCHLORIDE 20 MG: 5 INJECTION, SOLUTION INTRAVENOUS at 06:50

## 2024-09-18 RX ADMIN — SODIUM CHLORIDE, SODIUM LACTATE, POTASSIUM CHLORIDE, AND CALCIUM CHLORIDE 100 ML/HR: .6; .31; .03; .02 INJECTION, SOLUTION INTRAVENOUS at 06:12

## 2024-09-18 RX ADMIN — NICOTINE POLACRILEX 2 MG: 2 GUM, CHEWING ORAL at 17:18

## 2024-09-18 RX ADMIN — GLYCOPYRROLATE 0.4 MG: 0.2 INJECTION INTRAMUSCULAR; INTRAVENOUS at 06:50

## 2024-09-18 RX ADMIN — FAMOTIDINE 20 MG: 20 TABLET ORAL at 08:58

## 2024-09-18 RX ADMIN — KETOROLAC TROMETHAMINE 30 MG: 30 INJECTION, SOLUTION INTRAMUSCULAR; INTRAVENOUS at 06:50

## 2024-09-18 RX ADMIN — PANTOPRAZOLE SODIUM 20 MG: 20 TABLET, DELAYED RELEASE ORAL at 08:58

## 2024-09-18 RX ADMIN — ARIPIPRAZOLE 15 MG: 15 TABLET ORAL at 08:58

## 2024-09-18 RX ADMIN — PROPRANOLOL HYDROCHLORIDE 10 MG: 10 TABLET ORAL at 05:56

## 2024-09-18 RX ADMIN — ATROPINE SULFATE 1 DROP: 10 SOLUTION/ DROPS OPHTHALMIC at 21:40

## 2024-09-18 RX ADMIN — ESCITALOPRAM OXALATE 20 MG: 10 TABLET, FILM COATED ORAL at 08:58

## 2024-09-18 RX ADMIN — SODIUM CHLORIDE, SODIUM LACTATE, POTASSIUM CHLORIDE, AND CALCIUM CHLORIDE: .6; .31; .03; .02 INJECTION, SOLUTION INTRAVENOUS at 06:43

## 2024-09-18 RX ADMIN — SENNOSIDES AND DOCUSATE SODIUM 2 TABLET: 8.6; 5 TABLET ORAL at 21:08

## 2024-09-18 RX ADMIN — GLYCOPYRROLATE 1 MG: 1 TABLET ORAL at 08:58

## 2024-09-18 RX ADMIN — NICOTINE POLACRILEX 2 MG: 2 GUM, CHEWING ORAL at 21:08

## 2024-09-18 RX ADMIN — LORATADINE 10 MG: 10 TABLET ORAL at 08:58

## 2024-09-18 RX ADMIN — HYDRALAZINE HYDROCHLORIDE 20 MG: 20 INJECTION INTRAMUSCULAR; INTRAVENOUS at 06:50

## 2024-09-18 RX ADMIN — ACETAMINOPHEN 650 MG: 325 TABLET, FILM COATED ORAL at 14:16

## 2024-09-18 RX ADMIN — FLUTICASONE PROPIONATE 1 SPRAY: 50 SPRAY, METERED NASAL at 08:59

## 2024-09-18 RX ADMIN — HYDROCHLOROTHIAZIDE 12.5 MG: 12.5 TABLET ORAL at 05:56

## 2024-09-18 RX ADMIN — HYDRALAZINE HYDROCHLORIDE 10 MG: 20 INJECTION INTRAMUSCULAR; INTRAVENOUS at 07:10

## 2024-09-18 RX ADMIN — NICOTINE POLACRILEX 2 MG: 2 GUM, CHEWING ORAL at 13:03

## 2024-09-18 RX ADMIN — CLOZAPINE 200 MG: 200 TABLET ORAL at 17:18

## 2024-09-18 RX ADMIN — CYANOCOBALAMIN TAB 1000 MCG 1000 MCG: 1000 TAB at 08:58

## 2024-09-18 RX ADMIN — Medication 140 MG: at 06:50

## 2024-09-18 RX ADMIN — METFORMIN HYDROCHLORIDE 500 MG: 500 TABLET, FILM COATED ORAL at 08:58

## 2024-09-18 RX ADMIN — ETOMIDATE INJECTION 20 MG: 2 SOLUTION INTRAVENOUS at 06:50

## 2024-09-18 NOTE — NURSING NOTE
Tylenol follow up-    Patient is presently walking in the hallways on the unit.  Denies pain and offers no current complaints.

## 2024-09-18 NOTE — PLAN OF CARE
Problem: Ineffective Coping  Goal: Identifies ineffective coping skills  Outcome: Not Progressing  Goal: Identifies healthy coping skills  Outcome: Not Progressing  Goal: Demonstrates healthy coping skills  Outcome: Not Progressing  Goal: Participates in unit activities  Description: Interventions:  - Provide therapeutic environment   - Provide required programming   - Redirect inappropriate behaviors   Outcome: Progressing

## 2024-09-18 NOTE — PROCEDURES
"Procedure Note - ECT  Alberto Berumen 28 y.o. male MRN: [id number]    Time out was taken with staff to confirm correct patient and correct procedure to be performed. Alberto states that he is tired and states \" I have good days and bad days.\" Pt denies any adverse reactions to treatment. Any questions or concerns were addressed.     Session Number: Maintenance    Diagnosis: Principal Problem:    Schizoaffective disorder, bipolar type ([facility])  Active Problems:    GERD (gastroesophageal reflux disease)    Medical clearance for psychiatric admission    Tobacco abuse    T wave inversion in EKG    Chronic idiopathic constipation    Confluent and reticulate papillomatosis    Class 2 obesity in adult    Primary hypertension    Elevated hemoglobin A1c    Bilateral lower extremity edema      ECT Type: Inpatient    Anesthesia: Etomidate :    Electrode Placement: Bilateral    Energy level:  100 %      Seizure Duration     EEG: Not recorded    EMG : 13 Sec per visual    Post-ictal Suppression Index:NA    Results: Due to error in machine printout there was no recorded EEG results. Per visualization patient did have a seizure. Patient tolerated ECT well but took several minutes to wake up from anesthesia.     Vitals:    09/18/24 0745   BP: 122/65   Pulse: 102   Resp: 18   Temp: 97.5 °F (36.4 °C)   SpO2: 100%        Medication Administration - last 24 hours from 09/17/2024 0750 to 09/18/2024 0750         Date/Time Order Dose Route Action Action by     09/17/2024 0833 EDT amLODIPine (NORVASC) tablet 5 mg 5 mg Oral Given Brooke Sneed LPN     09/17/2024 0834 EDT escitalopram (LEXAPRO) tablet 20 mg 20 mg Oral Given Brooke Sneed LPN     09/18/2024 0556 EDT propranolol (INDERAL) tablet 10 mg 10 mg Oral Given Arin Travis RN     09/17/2024 2103 EDT propranolol (INDERAL) tablet 10 mg 10 mg Oral Given Kristyn Heredia RN     09/17/2024 0830 EDT propranolol (INDERAL) tablet 10 mg 10 mg Oral Given Brooke Sneed LPN     09/17/2024 0831 EDT " cyanocobalamin (VITAMIN B-12) tablet 1,000 mcg 1,000 mcg Oral Given Brooke Sneed LPN     09/17/2024 2104 EDT nicotine polacrilex (NICORETTE) gum 2 mg 2 mg Oral Given Kristyn Heredia RN     09/17/2024 1706 EDT nicotine polacrilex (NICORETTE) gum 2 mg 2 mg Oral Given Kristyn Heredia RN     09/17/2024 1246 EDT nicotine polacrilex (NICORETTE) gum 2 mg 2 mg Oral Given Arin Travis RN     09/17/2024 0832 EDT nicotine polacrilex (NICORETTE) gum 2 mg 2 mg Oral Given Brooke Sneed LPN     09/17/2024 0834 EDT fluticasone (FLONASE) 50 mcg/act nasal spray 1 spray 1 spray Each Nare Not Given Brooke Sneed LPN     09/17/2024 0833 EDT loratadine (CLARITIN) tablet 10 mg 10 mg Oral Given Brooke Sneed LPN     09/18/2024 0556 EDT hydroCHLOROthiazide tablet 12.5 mg 12.5 mg Oral Given Arin Travis RN     09/17/2024 0832 EDT hydroCHLOROthiazide tablet 12.5 mg 12.5 mg Oral Given Brooke Sneed LPN     09/17/2024 1706 EDT metFORMIN (GLUCOPHAGE) tablet 500 mg 500 mg Oral Given Kristyn Heredia RN     09/17/2024 0831 EDT metFORMIN (GLUCOPHAGE) tablet 500 mg 500 mg Oral Given Brooke Sneed LPN     09/17/2024 0833 EDT ARIPiprazole (ABILIFY) tablet 15 mg 15 mg Oral Given Brooke Sneed LPN     09/17/2024 1706 EDT famotidine (PEPCID) tablet 20 mg 20 mg Oral Given Kristyn Heredia RN     09/17/2024 0832 EDT famotidine (PEPCID) tablet 20 mg 20 mg Oral Given Brooke Sneed LPN     09/17/2024 1705 EDT clozapine (CLOZARIL) tablet 200 mg 200 mg Oral Given Kristyn Heredia RN     09/17/2024 2104 EDT cloZAPine (CLOZARIL) tablet 300 mg 300 mg Oral Given Kristyn Heredia RN     09/17/2024 2213 EDT senna-docusate sodium (SENOKOT S) 8.6-50 mg per tablet 2 tablet 2 tablet Oral Given Kristyn Heredia RN     09/17/2024 2103 EDT glycopyrrolate (ROBINUL) tablet 2 mg 2 mg Oral Given Kristyn Heredia RN     09/17/2024 1424 EDT glycopyrrolate (ROBINUL) tablet 1 mg 1 mg Oral Given Brooke Sneed LPN     09/17/2024 0833 EDT glycopyrrolate (ROBINUL) tablet 1 mg 1 mg Oral Given  Brooke Sneed LPN     09/17/2024 0832 EDT pantoprazole (PROTONIX) EC tablet 20 mg 20 mg Oral Given Brooke Sneed LPN     09/17/2024 2213 EDT atropine (ISOPTO ATROPINE) 1 % ophthalmic solution 1 drop 1 drop Sublingual Not Given Kristyn Heredia RN     09/18/2024 0732 EDT lactated ringers infusion 0 mL/hr Intravenous Stopped Cynthia Montenegro RN     09/18/2024 0612 EDT lactated ringers infusion 100 mL/hr Intravenous New Bag Cynthia Montenegro RN

## 2024-09-18 NOTE — PLAN OF CARE
Problem: Alteration in Thoughts and Perception  Goal: Verbalize thoughts and feelings  Description: Interventions:  - Promote a nonjudgmental and trusting relationship with the patient through active listening and therapeutic communication  - Assess patient's level of functioning, behavior and potential for risk  - Engage patient in 1 on 1 interactions  - Encourage patient to express fears, feelings, frustrations, and discuss symptoms    - [place] patient to reality, help patient recognize reality-based thinking   - Administer medications as ordered and assess for potential side effects  - Provide the patient education related to the signs and symptoms of the illness and desired effects of prescribed medications  Outcome: Progressing  Goal: Refrain from acting on delusional thinking/internal stimuli  Description: Interventions:  - Monitor patient closely, per order   - Utilize least restrictive measures   - Set reasonable limits, give positive feedback for acceptable   - Administer medications as ordered and monitor of potential side effects  Outcome: Progressing  Goal: Agree to be compliant with medication regime, as prescribed and report medication side effects  Description: Interventions:  - Offer appropriate PRN medication and supervise ingestion; conduct AIMS, as needed   Outcome: Progressing  Goal: Attend and participate in unit activities, including therapeutic, recreational, and educational groups  Description: Interventions:  -Encourage Visitation and family involvement in care  Outcome: Progressing  Goal: Recognize dysfunctional thoughts, communicate reality-based thoughts at the time of discharge  Description: Interventions:  - Provide medication and psycho-education to assist patient in compliance and developing insight into his/her illness   Outcome: Progressing  Goal: Complete daily ADLs, including personal hygiene independently, as able  Description: Interventions:  - Observe, teach, and assist  patient with ADLS  - Monitor and promote a balance of rest/activity, with adequate nutrition and elimination   Outcome: Progressing     Problem: Ineffective Coping  Goal: Identifies ineffective coping skills  Outcome: Progressing  Goal: Identifies healthy coping skills  Outcome: Progressing  Goal: Demonstrates healthy coping skills  Outcome: Progressing  Goal: Participates in unit activities  Description: Interventions:  - Provide therapeutic environment   - Provide required programming   - Redirect inappropriate behaviors   Outcome: Progressing     Problem: Depression  Goal: Refrain from isolation  Description: Interventions:  - Develop a trusting relationship   - Encourage socialization   Outcome: Progressing     Problem: Anxiety  Goal: Anxiety is at manageable level  Description: Interventions:  - Assess and monitor patient's anxiety level.   - Monitor for signs and symptoms (heart palpitations, chest pain, shortness of breath, headaches, nausea, feeling jumpy, restlessness, irritable, apprehensive).   - Collaborate with interdisciplinary team and initiate plan and interventions as ordered.  - [place] patient to unit/surroundings  - Explain treatment plan  - Encourage participation in care  - Encourage verbalization of concerns/fears  - Identify coping mechanisms  - Assist in developing anxiety-reducing skills  - Administer/offer alternative therapies  - Limit or eliminate stimulants  Outcome: Progressing     Problem: Alteration in Thoughts and Perception  Goal: Treatment Goal: Gain control of psychotic behaviors/thinking, reduce/eliminate presenting symptoms and demonstrate improved reality functioning upon discharge  Outcome: Not Progressing     Problem: Depression  Goal: Treatment Goal: Demonstrate behavioral control of depressive symptoms, verbalize feelings of improved mood/affect, and adopt new coping skills prior to discharge  Outcome: Not Progressing

## 2024-09-18 NOTE — ANESTHESIA PREPROCEDURE EVALUATION
Procedure:  ELECTROCONVULSIVE THERAPY (ECT)  ECG: NSR with possible inferior infarct and lateral ischemia     Denies the following: CP/SOB with exertion, asthma, COPD, EULALIA, stroke/TIA, seizure    Relevant Problems   CARDIO   (+) Primary hypertension      GI/HEPATIC   (+) GERD (gastroesophageal reflux disease)             Anesthesia Plan  ASA Score- 3     Anesthesia Type- general with ASA Monitors.         Additional Monitors:     Airway Plan:     Comment: MV.       Plan Factors-    Chart reviewed. EKG reviewed.  Existing labs reviewed. Patient summary reviewed.    Patient is not a current smoker.              Induction- intravenous.    Postoperative Plan-     Perioperative Resuscitation Plan - Level 1 - Full Code.       Informed Consent- Anesthetic plan and risks discussed with patient.  I personally reviewed this patient with the CRNA. Discussed and agreed on the Anesthesia Plan with the CRNA..

## 2024-09-18 NOTE — NURSING NOTE
Patient has been visible on the unit for meals only. ECT completed today. C/O tiredness afterwards. AOx3. No swallowing difficulties. Consumed applesauce. Frequent vital signs completed post ECT. Stable. Affect flat. Appetite good. Admits feeling depressed but did not rate it.  Medication compliant. Continues to verbalize having auditory hallucinations telling him they want to hurt him and his family. Support offered. Social with female peer S.C. Denies suicidal ideations.

## 2024-09-18 NOTE — PROGRESS NOTES
09/18/24 0740   Team Meeting   Meeting Type Daily Rounds   Team Members Present   Team Members Present Physician;Nurse;;Other (Discipline and Name)   Patient/Family Present   Patient Present No   Patient's Family Present No     In attendance:  Dr. Alex Thomas, MD Dr. Jordan Holter, DO Guy Taylor, RN  Lu[facility]varado, [facility]  Carla Lux, [facility]  Irais Mcdowell, M.S.    Groups: 10/11    Pt appropriate with female peer. Reports ongoing AH. Pt denies other symptoms. Last ECT today.

## 2024-09-18 NOTE — PROGRESS NOTES
"Psychiatry Progress Note [facility]    Alberto Berumen 28 y.o. male MRN: [id number]  Unit/Bed#: -02 Encounter: [id number]  Code Status: Level 1 - Full Code    PCP: Joce Juan MD    Date of Admission:  3/29/2024 2008   Date of Service:  09/18/24    Patient Active Problem List   Diagnosis    GERD (gastroesophageal reflux disease)    Medical clearance for psychiatric admission    Schizoaffective disorder, bipolar type ([facility])    Tobacco abuse    T wave inversion in EKG    Syringoma    Chronic idiopathic constipation    Vitamin B 12 deficiency    Vitamin D deficiency    Confluent and reticulate papillomatosis    Class 2 obesity in adult    Primary hypertension    Elevated hemoglobin A1c    Bilateral lower extremity edema         Review of systems: No symptoms reported  Psychiatric Diagnosis: Schizoaffective Disorder, Bipolar Type.     Assessment  Overall Status: Appeared fatigued and found patient sleeping following this morning's ECT session. He stated that he slept well last night but was feeling tired after the treatment. He reported that his mood was \"fine\" and that his depression symptoms were a 4.5/10 today. He denied any suicidal thoughts, plans, or intents. He stated the threatening auditory hallucinations continue to be an issue.    Certification Statement: The patient will continue to require additional inpatient hospital stay due to continued psychotic and severe symptoms of depression.      Assessment & Plan  Schizoaffective disorder, bipolar type (HCC)  Schizoaffective disorder, bipolar type  Continues to have depressive symptoms as well as hearing frequent, threatening auditory hallucinations.   Continues to have paranoid delusions and a blunted affect.  Appears to be making small improvements with his symptoms    Continue aripiprazole 15 mg tablet PO daily  Continue clozapine 200 mg tablet PO before dinner  Continue clozapine 300 mg tablet PO HS  Continue escitalopram 20 " mg tablet PO daily  Continue senna-docusate sodium two 50 mg tablets PO HS  Continue atropine 1% solution SL HS  Tobacco abuse    GERD (gastroesophageal reflux disease)  Continue famotidine 20 mg tablet BID per medical team  Continue glycopyrrolate 1 mg tablet BID per medical team  Continue pantoprazole EC 10 gm table po daily in the morning per medical team.    Primary hypertension  Continue amlodipine 5 mg tablet po daily per medical team  Continue hydrochlorothiazide 12.5 mg tablet po daily per medical team  Continue propranolol 10 mg tablet po Q 12 hours per medical team  Elevated hemoglobin A1c  Continue metformin 500 mg tablet po BID per medical team  Chronic idiopathic constipation  Continue senna docusate sodium two 50 mg tablet po HS   Class 2 obesity in adult           Medications:   Abilify 15 mg po daily, clozaril 200 mg po at dinner and 300 mg po HS, B12 1,000 mg po daily, lexapro 20 mg po daily, glycopyrrolate 1 mg BID and 2 mg po HS, propranolol 10 mg po BID, senna docusate sodium, 2 tables po HS  Side effects from treatment: Reported drooling previously but stated that he did not drool last night after being given atropine drops.   Medication changes   None   Medication education  Risks side effects benefits and precautions of medications discussed with patient and he did verbalize an understanding about risks for metabolic syndrome from being on neuroleptics and is form tardive dyskinesia etc.  All medications reviewed and I recommend that they be continued for symptom management  Understanding of medications: Verbalized   Justification for dual anti-psychotics: Inability to control psychotic symptoms.      Non-pharmacological treatments  Continue with individual, group, milieu and occupational therapy using recovery principles and psycho-education about accepting illness and the need for treatment.  Last session of ECT was this morning.    Safety  Safety and communication plan established to target  "dynamic risk factors discussed above.    Discharge Plan   Aunt's house vs group home    Interval Progress   Patient appeared and reported feeling very tired following his ECT session earlier this morning. He was drowsy and continued to fall asleep during the interview and had to be awakened several times to complete the interview. He reported that he slept well last night, only a little bit less than usual. He reported that his mood was \"fine\" and that his depression symptoms were approximately a 4.5/10 today. He confirmed that his appetite is still good and he denied any side effects from his medications apart from drooling. He reported that he did not drool last night after he received his atropine drops but he has the past few days. He denied any active suicidal or homicidal thoughts, plans, or intents. He continues to report that the threatening voices still occur.   Acceptance by patient: Yes   Hopefulness in recovery: Hoping to live with aunt   Involved in reintegration process: Aunt   Trusting in relationship with psychiatrist: Yes  Sleep: Good, slightly less than usual   Appetite: Good  Compliance with Medications: Good  Group attendance: 10/11 groups  Significant events: Last ECT session occurred this morning.      Mental Status Exam  Appearance: age appropriate, overweight, wearing a personal sweatshirt and scrub pants.  Behavior: calm, laying in bed sleeping. Required stimulation several times to be awakened during the interview.   Speech: normal pitch, slow, scant, soft  Mood: depressed  Affect:  depressed, flat, mood congruent  Thought Process: organized, linear  Thought Content: paranoid ideation, stating that he feels the voices are out to get him although he reported feeling safe in the hospital. He noted that the threatening auditory hallucinations continue. He denied any suicidal or homicidal thoughts, plans, or intents. No obsessions, compulsions, or distorted body perceptions verbalized. He " "denies any other experiences that would be categorized as grandiose, somatic, or bizarre delusions.   Perceptual Disturbances: auditory hallucinations that tell him they cannot wait for him to get out of the hospital so they can shoot him and his family. Patient reported that the voices sometimes sound like his ex girlfriend while other times they sound like strangers to him. He states that they continue and \"come and go\"., denies visual hallucinations when asked, does not appear responding to internal stimuli  Hx Risk Factors: chronic psychiatric problems, chronic depressive symptoms, chronic psychotic symptoms  Sensorium: alert and oriented to person, place, time and situation  Cognition: recent and remote memory grossly intact  Consciousness: alert and awake  Attention: attention span and concentration are age appropriate  Intellect: appears to be of average intelligence  Insight: limited  Judgement: impaired  Motor Activity: no abnormal movements     Vitals  Temp:  [97.5 °F (36.4 °C)-97.9 °F (36.6 °C)] 97.6 °F (36.4 °C)  HR:  [] 76  Resp:  [11-34] 18  BP: ()/(52-79) 120/59  SpO2:  [95 %-100 %] 99 %    Intake/Output Summary (Last 24 hours) at 9/18/2024 0948  Last data filed at 9/18/2024 0732  Gross per 24 hour   Intake 1300 ml   Output --   Net 1300 ml       Lab Results: All Labs For Current Hospital Admission Reviewed    Current Facility-Administered Medications   Medication Dose Route Frequency Provider Last Rate    acetaminophen  650 mg Oral Q4H PRN Jordan C Holter, DO      acetaminophen  650 mg Oral Q6H PRN HOLLI Lion      aluminum-magnesium hydroxide-simethicone  30 mL Oral Q4H PRN Jordan C Holter, DO      amLODIPine  5 mg Oral Daily HOLLI Lion      ARIPiprazole  15 mg Oral Daily Bora Rosario MD      Artificial Tears  1 drop Both Eyes Q3H PRN Jordan C Holter, DO      atropine  1 drop Sublingual HS Harjeet Torres, DO      haloperidol lactate  2.5 mg Intramuscular Q4H PRN Max " 4/day Evelinevipul Hunt, CRNP      And    LORazepam  1 mg Intramuscular Q4H PRN Max 4/day Eveline Gilbert, CRNP      And    benztropine  0.5 mg Intramuscular Q4H PRN Max 4/day Eveline Miguelitoey, CRNP      benztropine  1 mg Intramuscular Q4H PRN Max 6/day Ion Dupontter, DO      haloperidol lactate  5 mg Intramuscular Q4H PRN Max 4/day Evelinevipul Hunt, CRNP      And    LORazepam  2 mg Intramuscular Q4H PRN Max 4/day Evelinevipul Hunt, CRNP      And    benztropine  1 mg Intramuscular Q4H PRN Max 4/day Eveline Miguelitoey, CRNP      benztropine  1 mg Oral Q4H PRN Max 6/day Eveline Gilbert, CRNP      benztropine  1 mg Oral Q4H PRN Max 6/day Ion Dupontter, DO      bisacodyl  10 mg Rectal Daily PRN Eveline Hunt, CRNP      calcium carbonate  500 mg Oral BID PRN HOLLI Monge      cloZAPine  200 mg Oral Before Dinner Bora Rosario MD      cloZAPine  300 mg Oral HS Bora Rosario MD      cyanocobalamin  1,000 mcg Oral Daily HOLLI Galvan      hydrOXYzine HCL  50 mg Oral Q6H PRN Max 4/day Jordan C Holter, DO      Or    diphenhydrAMINE  50 mg Intramuscular Q6H PRN Ion Dupontter, DO      hydrOXYzine HCL  50 mg Oral Q6H PRN Max 4/day HOLLI Lion      Or    diphenhydrAMINE  50 mg Intramuscular Q6H PRN HOLLI Lion      diphenhydrAMINE-zinc acetate   Topical BID PRN Eveline Hunt, CRNP      escitalopram  20 mg Oral Daily Evelinevipul Hunt, CRJENNIE      famotidine  20 mg Oral BID HOLLI Monge      fluticasone  1 spray Each Nare Daily Brina Guillen MD      glycopyrrolate  1 mg Oral BID (AM & Afternoon) Bora Rosario MD      glycopyrrolate  2 mg Oral HS Bora Rosario MD      haloperidol  1 mg Oral Q6H PRN Eveline Hunt, HOLLI      haloperidol  2.5 mg Oral Q4H PRN Max 4/day Evelinevipul Hunt, HOLLI      haloperidol  5 mg Oral Q4H PRN Max 4/day Evelinevipul Hunt, HOLLI      hydroCHLOROthiazide  12.5 mg Oral Daily HOLLI Galvan      hydrocortisone   Topical 4x Daily PRN HOLLI Lion      hydrOXYzine HCL  100 mg  Oral Q6H PRN Max 4/day [facility] Holter, DO      Or    LORazepam  2 mg Intramuscular Q6H PRN [facility] Cresencioter, DO      hydrOXYzine HCL  100 mg Oral Q6H PRN Max 4/day HOLLI Lion      Or    LORazepam  2 mg Intramuscular Q6H PRN HOLLI Lion      hydrOXYzine HCL  25 mg Oral Q6H PRN Max 4/day [facility] Holter, DO      ibuprofen  600 mg Oral Q8H PRN HOLLI Lion      loratadine  10 mg Oral Daily Brina Guillen MD      melatonin  3 mg Oral HS PRN Bora Rosario MD      metFORMIN  500 mg Oral BID With Meals HOLLI Galvan      methocarbamol  500 mg Oral Q6H PRN HOLLI Lion      nicotine polacrilex  2 mg Oral Q4H PRN Bora Rosario MD      OLANZapine  5 mg Oral Q4H PRN Max 3/day [facility] Holter, DO      Or    OLANZapine  2.5 mg Intramuscular Q4H PRN Max 3/day [facility] Holter, DO      OLANZapine  5 mg Oral Q3H PRN Max 3/day [facility] Holter, DO      Or    OLANZapine  5 mg Intramuscular Q3H PRN Max 3/day [facility] Holter, DO      OLANZapine  2.5 mg Oral Q4H PRN Max 6/day [facility] Cresencioter, DO      ondansetron  4 mg Oral Q6H PRN HOLLI Lion      pantoprazole  20 mg Oral QAM [facility] Holter, DO      polyethylene glycol  17 g Oral Daily PRN HOLLI Ghotra      propranolol  10 mg Oral Q12H KAYLIE HOLLI Lion      senna-docusate sodium  1 tablet Oral Daily PRN [facility] Cresencioter, DO      senna-docusate sodium  2 tablet Oral HS Melvina Ambron, DO      traZODone  50 mg Oral HS PRN HOLLI Lion      white petrolatum-mineral oil   Topical TID PRN HOLLI Lion         Counseling / Coordination of Care: Total floor / unit time spent today 15 minutes. Greater than 50% of total time was spent with the patient and / or family counseling and / or somewhat receptive to supportive listening and teaching positive coping skills to deal with symptom mangement.     Patient's Rights, confidentiality and exceptions to confidentiality, use of automated medical record, [facility]  staff access to medical record, and consent to treatment reviewed.    This note has been dictated and hence there may be problems with punctuation, spelling and formatting and if anyone has any concerns please address them to the writer.  This note is not shared with patient due to potential for making patient's condition worse by knowing the content of the note.    Eveline WALLACE

## 2024-09-18 NOTE — NURSING NOTE
Prescribed 0900 blood pressure medications given as ordered prior to ECT with exception of amlodipine due to parameter.

## 2024-09-18 NOTE — ANESTHESIA POSTPROCEDURE EVALUATION
Post-Op Assessment Note    CV Status:  Stable    Pain management: adequate       Mental Status:  Arousable   Hydration Status:  Euvolemic   PONV Controlled:  Controlled   Airway Patency:  Patent     Post Op Vitals Reviewed: Yes    No anethesia notable event occurred.    Staff: CRNA               /66 (09/18/24 0716)    Temp      Pulse (!) 116 (09/18/24 0716)   Resp 15 (09/18/24 0716)    SpO2 97 % (09/18/24 0716)

## 2024-09-18 NOTE — NURSING NOTE
Patient complains of having a headache rating it a 6/10 scale. Alternate measures completed and ineffective. Tylenol 650 mg PO given per patient request. Offers no other complaints.

## 2024-09-19 LAB
BASOPHILS # BLD AUTO: 0.04 THOUSANDS/ÂΜL (ref 0–0.1)
BASOPHILS NFR BLD AUTO: 0 % (ref 0–1)
BNP SERPL-MCNC: 11 PG/ML (ref 0–100)
CK SERPL-CCNC: 241 U/L (ref 39–308)
CLOZAPINE SERPL-MCNC: 580 NG/ML (ref 350–900)
CRP SERPL QL: 8.9 MG/L
EOSINOPHIL # BLD AUTO: 1.39 THOUSAND/ÂΜL (ref 0–0.61)
EOSINOPHIL NFR BLD AUTO: 12 % (ref 0–6)
ERYTHROCYTE [DISTWIDTH] IN BLOOD BY AUTOMATED COUNT: 14.6 % (ref 11.6–15.1)
HCT VFR BLD AUTO: 42 % (ref 36.5–49.3)
HGB BLD-MCNC: 12.3 G/DL (ref 12–17)
IMM GRANULOCYTES # BLD AUTO: 0.06 THOUSAND/UL (ref 0–0.2)
IMM GRANULOCYTES NFR BLD AUTO: 1 % (ref 0–2)
LYMPHOCYTES # BLD AUTO: 3.19 THOUSANDS/ÂΜL (ref 0.6–4.47)
LYMPHOCYTES NFR BLD AUTO: 28 % (ref 14–44)
MCH RBC QN AUTO: 23 PG (ref 26.8–34.3)
MCHC RBC AUTO-ENTMCNC: 29.3 G/DL (ref 31.4–37.4)
MCV RBC AUTO: 79 FL (ref 82–98)
MONOCYTES # BLD AUTO: 1.11 THOUSAND/ÂΜL (ref 0.17–1.22)
MONOCYTES NFR BLD AUTO: 10 % (ref 4–12)
NEUTROPHILS # BLD AUTO: 5.72 THOUSANDS/ÂΜL (ref 1.85–7.62)
NEUTS SEG NFR BLD AUTO: 49 % (ref 43–75)
NRBC BLD AUTO-RTO: 0 /100 WBCS
PLATELET # BLD AUTO: 267 THOUSANDS/UL (ref 149–390)
PMV BLD AUTO: 11.3 FL (ref 8.9–12.7)
RBC # BLD AUTO: 5.34 MILLION/UL (ref 3.88–5.62)
WBC # BLD AUTO: 11.51 THOUSAND/UL (ref 4.31–10.16)

## 2024-09-19 PROCEDURE — 80159 DRUG ASSAY CLOZAPINE: CPT

## 2024-09-19 PROCEDURE — 99232 SBSQ HOSP IP/OBS MODERATE 35: CPT | Performed by: PSYCHIATRY & NEUROLOGY

## 2024-09-19 PROCEDURE — 85025 COMPLETE CBC W/AUTO DIFF WBC: CPT

## 2024-09-19 PROCEDURE — 86140 C-REACTIVE PROTEIN: CPT

## 2024-09-19 PROCEDURE — 83880 ASSAY OF NATRIURETIC PEPTIDE: CPT

## 2024-09-19 PROCEDURE — 82550 ASSAY OF CK (CPK): CPT

## 2024-09-19 RX ADMIN — ESCITALOPRAM OXALATE 20 MG: 10 TABLET, FILM COATED ORAL at 08:11

## 2024-09-19 RX ADMIN — ATROPINE SULFATE 1 DROP: 10 SOLUTION/ DROPS OPHTHALMIC at 21:16

## 2024-09-19 RX ADMIN — PANTOPRAZOLE SODIUM 20 MG: 20 TABLET, DELAYED RELEASE ORAL at 08:10

## 2024-09-19 RX ADMIN — CLOZAPINE 200 MG: 200 TABLET ORAL at 17:12

## 2024-09-19 RX ADMIN — NICOTINE POLACRILEX 2 MG: 2 GUM, CHEWING ORAL at 21:16

## 2024-09-19 RX ADMIN — AMLODIPINE BESYLATE 5 MG: 5 TABLET ORAL at 08:13

## 2024-09-19 RX ADMIN — LORATADINE 10 MG: 10 TABLET ORAL at 08:13

## 2024-09-19 RX ADMIN — HYDROCHLOROTHIAZIDE 12.5 MG: 12.5 TABLET ORAL at 08:11

## 2024-09-19 RX ADMIN — PROPRANOLOL HYDROCHLORIDE 10 MG: 10 TABLET ORAL at 21:16

## 2024-09-19 RX ADMIN — SENNOSIDES AND DOCUSATE SODIUM 2 TABLET: 8.6; 5 TABLET ORAL at 21:16

## 2024-09-19 RX ADMIN — FAMOTIDINE 20 MG: 20 TABLET ORAL at 08:11

## 2024-09-19 RX ADMIN — GLYCOPYRROLATE 1 MG: 1 TABLET ORAL at 13:00

## 2024-09-19 RX ADMIN — GLYCOPYRROLATE 1 MG: 1 TABLET ORAL at 08:12

## 2024-09-19 RX ADMIN — FLUTICASONE PROPIONATE 1 SPRAY: 50 SPRAY, METERED NASAL at 08:14

## 2024-09-19 RX ADMIN — METFORMIN HYDROCHLORIDE 500 MG: 500 TABLET, FILM COATED ORAL at 17:12

## 2024-09-19 RX ADMIN — PROPRANOLOL HYDROCHLORIDE 10 MG: 10 TABLET ORAL at 08:10

## 2024-09-19 RX ADMIN — CLOZAPINE 300 MG: 200 TABLET ORAL at 21:16

## 2024-09-19 RX ADMIN — GLYCOPYRROLATE 2 MG: 1 TABLET ORAL at 21:16

## 2024-09-19 RX ADMIN — METFORMIN HYDROCHLORIDE 500 MG: 500 TABLET, FILM COATED ORAL at 08:13

## 2024-09-19 RX ADMIN — ARIPIPRAZOLE 15 MG: 15 TABLET ORAL at 08:12

## 2024-09-19 RX ADMIN — NICOTINE POLACRILEX 2 MG: 2 GUM, CHEWING ORAL at 17:12

## 2024-09-19 RX ADMIN — CYANOCOBALAMIN TAB 1000 MCG 1000 MCG: 1000 TAB at 08:12

## 2024-09-19 RX ADMIN — NICOTINE POLACRILEX 2 MG: 2 GUM, CHEWING ORAL at 08:12

## 2024-09-19 RX ADMIN — FAMOTIDINE 20 MG: 20 TABLET ORAL at 17:11

## 2024-09-19 RX ADMIN — NICOTINE POLACRILEX 2 MG: 2 GUM, CHEWING ORAL at 12:57

## 2024-09-19 NOTE — NURSING NOTE
Patient has been more visible on the unit during 2nd shift. Observed ambulating in the hallways with select female peer S.C. Affect flat. Pleasant and polite. Soft spoken. Disheveled appearance. Medication compliant. Denies suicidal ideations. Continues to say he has auditory hallucination telling him they are going to hurt him and his family. Support offered. Group attendance today- 3/9. Encouraged to attend more groups. Appetite 0%, 100%, 100%. Guarded

## 2024-09-19 NOTE — PROGRESS NOTES
09/19/24 0743   Team Meeting   Meeting Type Daily Rounds   Team Members Present   Team Members Present Physician;Nurse;;Other (Discipline and Name)   Patient/Family Present   Patient Present No   Patient's Family Present No     In attendance:  MD Guy Gamino, RN  Luisana Alvarado, \[facility] [place] [facility]\""W  Carla Lux, \[facility] [place] [facility]\""W  Irais Mcdowell M.S.    Groups: 3/9    Pt completed final ECT yesterday. Pt missed breakfast and napped after ECT. Pt reports some depression, ongoing AH. Received Tylenol for headache. Pt appropriate with female peer. CRR referral pending.

## 2024-09-19 NOTE — NURSING NOTE
"Pt visible on unit. Pt reports the voices are \"low\" and depression/anxiety are \"okay.\" Denies SI/HI/VH. Compliant with medications and meals. Social with select peer. No inappropriate behaviors noted. Q 7 min checks maintained.   "

## 2024-09-19 NOTE — PLAN OF CARE
Problem: Alteration in Thoughts and Perception  Goal: Treatment Goal: Gain control of psychotic behaviors/thinking, reduce/eliminate presenting symptoms and demonstrate improved reality functioning upon discharge  Outcome: Progressing  Goal: Verbalize thoughts and feelings  Description: Interventions:  - Promote a nonjudgmental and trusting relationship with the patient through active listening and therapeutic communication  - Assess patient's level of functioning, behavior and potential for risk  - Engage patient in 1 on 1 interactions  - Encourage patient to express fears, feelings, frustrations, and discuss symptoms    - [place] patient to reality, help patient recognize reality-based thinking   - Administer medications as ordered and assess for potential side effects  - Provide the patient education related to the signs and symptoms of the illness and desired effects of prescribed medications  Outcome: Progressing  Goal: Refrain from acting on delusional thinking/internal stimuli  Description: Interventions:  - Monitor patient closely, per order   - Utilize least restrictive measures   - Set reasonable limits, give positive feedback for acceptable   - Administer medications as ordered and monitor of potential side effects  Outcome: Progressing  Goal: Agree to be compliant with medication regime, as prescribed and report medication side effects  Description: Interventions:  - Offer appropriate PRN medication and supervise ingestion; conduct AIMS, as needed   Outcome: Progressing  Goal: Complete daily ADLs, including personal hygiene independently, as able  Description: Interventions:  - Observe, teach, and assist patient with ADLS  - Monitor and promote a balance of rest/activity, with adequate nutrition and elimination   Outcome: Progressing     Problem: Ineffective Coping  Goal: Identifies ineffective coping skills  Outcome: Progressing  Goal: Identifies healthy coping skills  Outcome: Progressing  Goal:  Demonstrates healthy coping skills  Outcome: Progressing     Problem: Depression  Goal: Verbalize thoughts and feelings  Description: Interventions:  - Assess and re-assess patient's level of risk   - Engage patient in 1:1 interactions, daily, for a minimum of 15 minutes   - Encourage patient to express feelings, fears, frustrations, hopes   Outcome: Progressing  Goal: Refrain from harming self  Description: Interventions:  - Monitor patient closely, per order   - Supervise medication ingestion, monitor effects and side effects   Outcome: Progressing  Goal: Refrain from isolation  Description: Interventions:  - Develop a trusting relationship   - Encourage socialization   Outcome: Progressing  Goal: Refrain from self-neglect  Outcome: Progressing  Goal: Complete daily ADLs, including personal hygiene independently, as able  Description: Interventions:  - Observe, teach, and assist patient with ADLS  -  Monitor and promote a balance of rest/activity, with adequate nutrition and elimination   Outcome: Progressing     Problem: Anxiety  Goal: Anxiety is at manageable level  Description: Interventions:  - Assess and monitor patient's anxiety level.   - Monitor for signs and symptoms (heart palpitations, chest pain, shortness of breath, headaches, nausea, feeling jumpy, restlessness, irritable, apprehensive).   - Collaborate with interdisciplinary team and initiate plan and interventions as ordered.  - [place] patient to unit/surroundings  - Explain treatment plan  - Encourage participation in care  - Encourage verbalization of concerns/fears  - Identify coping mechanisms  - Assist in developing anxiety-reducing skills  - Administer/offer alternative therapies  - Limit or eliminate stimulants  Outcome: Progressing

## 2024-09-19 NOTE — PROGRESS NOTES
"Psychiatry Progress Note [facility]    Alberto Berumen 28 y.o. male MRN: [id number]  Unit/Bed#: [facility] 108-02 Encounter: [id number]  Code Status: Level 1 - Full Code    PCP: Joce Juan MD    Date of Admission:  3/29/2024 2008   Date of Service:  09/19/24    Patient Active Problem List   Diagnosis    GERD (gastroesophageal reflux disease)    Medical clearance for psychiatric admission    Schizoaffective disorder, bipolar type (HCC)    Tobacco abuse    T wave inversion in EKG    Syringoma    Chronic idiopathic constipation    Vitamin B 12 deficiency    Vitamin D deficiency    Confluent and reticulate papillomatosis    Class 2 obesity in adult    Primary hypertension    Elevated hemoglobin A1c    Bilateral lower extremity edema         Review of systems: No symptoms reported  Psychiatric Diagnosis: Schizoaffective Disorder, Bipolar Type     Assessment  Overall Status: Patient reported feeling much better today and was noted to be walking around the unit with two of his peers. He reported that he feels much more rested today after sleeping early last night and after his ECT session yesterday morning. He reported that his depression symptoms were a 5/10 today. He stated that he continues to hear the threatening auditory hallucinations \"every day\" but that they seem to be quieter now.   Certification Statement: The patient will continue to require additional inpatient hospital stay due to continued psychotic and severe symptoms of depression.       Assessment & Plan  Schizoaffective disorder, bipolar type (HCC)  Schizoaffective disorder, bipolar type  Continues to have depressive symptoms as well as hearing frequent, threatening auditory hallucinations.   Continues to have paranoid delusions and a blunted affect.  Appears to be making small improvements with his symptoms    Continue aripiprazole 15 mg tablet PO daily  Continue clozapine 200 mg tablet PO before dinner  Continue clozapine 300 mg " tablet PO HS  Continue escitalopram 20 mg tablet PO daily  Continue senna-docusate sodium two 50 mg tablets PO HS  Continue atropine 1% solution SL HS  Tobacco abuse    GERD (gastroesophageal reflux disease)  Continue famotidine 20 mg tablet BID per medical team  Continue glycopyrrolate 1 mg tablet BID per medical team  Continue pantoprazole EC 10 gm table po daily in the morning per medical team.    Primary hypertension  Continue amlodipine 5 mg tablet po daily per medical team  Continue hydrochlorothiazide 12.5 mg tablet po daily per medical team  Continue propranolol 10 mg tablet po Q 12 hours per medical team  Elevated hemoglobin A1c  Continue metformin 500 mg tablet po BID per medical team  Chronic idiopathic constipation  Continue senna docusate sodium two 50 mg tablet po HS   Class 2 obesity in adult           Medications:   Abilify 15 mg po daily, clozaril 200 mg po at dinner and 300 mg po HS, B12 1,000 mg po daily, lexapro 20 mg po daily, glycopyrrolate 1 mg BID and 2 mg po HS, propranolol 10 mg po BID, senna docusate, 2 tablets po HS  Side effects from treatment: Reported drooling against last night despite getting his atropine drops. He reported that he feels he is drooling less than he was prior to receiving the drops.    Medication changes   None   Medication education  Risks side effects benefits and precautions of medications discussed with patient and he did verbalize an understanding about risks for metabolic syndrome from being on neuroleptics and is form tardive dyskinesia etc.  All medications reviewed and I recommend that they be continued for symptom management  Understanding of medications: Verbalized   Justification for dual anti-psychotics: Inability to control psychotic symptoms.     Non-pharmacological treatments  Continue with individual, group, milieu and occupational therapy using recovery principles and psycho-education about accepting illness and the need for  treatment.    Safety  Safety and communication plan established to target dynamic risk factors discussed above.    Discharge Plan   Group home vs aunt's house    Interval Progress   Patient appeared and reported feeling well rested today. He stated that he was very tired after his ECT treatment and slept most of the morning and missed breakfast, but that it was relatively normal for him after sessions of ECT. He reported that he also went to bed earlier than usual last night and slept well throughout the night. He received 650 mg of acetaminophen at 14:16 yesterday for a 6/10 headache. He believes the headache was caused by his ECT session earlier that morning and reported that this has happened to him in the past. He stated his symptoms slightly improved after receiving the PRN acetaminophen. He reported that his auditory hallucinations are still present and he hears them everyday, but that they are quieter now. He confirmed that his appetite is still good and that he only missed breakfast yesterday because he was too tired from ECT. He stated that his depression symptoms were a 5/10 but denied any other reason besides from the voices. He reported drooling last night but stated that he feels it has occurred less since he started receiving the atropine drops. We discussed further his plans for after discharge and he once again endorsed that he spoke with his aunt yesterday and that she is willing to have him come live with her after he is discharged from the hospital. He denied any active suicidal or homicidal thoughts, plans, or intents.   Acceptance by patient: Yes   Hopefulness in recovery: Hoping to live with aunt   Involved in reintegration process: Aunt   Trusting in relationship with psychiatrist: Yes   Sleep: Good, fell asleep earlier than usual last night and slept well throughout the night  Appetite: Good  Compliance with Medications: Good  Group attendance: 3/9  Significant events: Reported that he spoke  with his aunt yesterday and that she is willing to have him come live with her after he is discharged from the hospital.      Mental Status Exam  Appearance: age appropriate, overweight, wearing a personal sweatshirt and hospital scrub pants.  Behavior: cooperative, calm  Speech: normal rate, normal volume, normal pitch, scant  Mood: depressed  Affect:  depressed, flat, mood congruent  Thought Process: organized, linear  Thought Content: paranoid ideation, stating that he feels the voices are out to get him although he stated that he does feel safe in the hospital. He noted that the threatening auditory hallucinations continue but that they seem quieter. He denied any suicidal or hospital thoughts, plans, or intents. No obsessions, compulsions, or distorted body perceptions verbalized. He denies any other experiences that would be categorized as grandiose, somatic, or bizarre delusions.    Perceptual Disturbances: auditory hallucinations that tell him they are going to kill him and his family. He stated that sometimes the voices sound like his ex girlfriend while at other times they sound like strangers to him. He reported hearing them every day and that they come and go but that they appeared to be quieter recently., denies visual hallucinations when asked, does not appear responding to internal stimuli  Hx Risk Factors: chronic psychiatric problems, chronic depressive symptoms, chronic psychotic symptoms  Sensorium: alert and oriented to person, place, time and situation  Cognition: recent and remote memory grossly intact  Consciousness: alert and awake  Attention: attention span and concentration are age appropriate  Intellect: appears to be of average intelligence  Insight: limited  Judgement: impaired  Motor Activity: no abnormal movements     Vitals  Temp:  [97.5 °F (36.4 °C)-97.6 °F (36.4 °C)] 97.6 °F (36.4 °C)  HR:  [] 99  Resp:  [18] 18  BP: (105-135)/(60-87) 133/73  SpO2:  [97 %-98 %] 97  %    Intake/Output Summary (Last 24 hours) at 9/19/2024 0920  Last data filed at 9/18/2024 1640  Gross per 24 hour   Intake 0 ml   Output --   Net 0 ml       Lab Results: All Labs For Current Hospital Admission Reviewed    Current Facility-Administered Medications   Medication Dose Route Frequency Provider Last Rate    acetaminophen  650 mg Oral Q4H PRN Jordan C Holter, DO      acetaminophen  650 mg Oral Q6H PRN HOLLI Lion      aluminum-magnesium hydroxide-simethicone  30 mL Oral Q4H PRN Jordan C Holter, DO      amLODIPine  5 mg Oral Daily STEVE LionNP      ARIPiprazole  15 mg Oral Daily Bora Rosario MD      Artificial Tears  1 drop Both Eyes Q3H PRN Jordan C Holter, DO      atropine  1 drop Sublingual HS Harjeet BalderramaDO jose carlos      haloperidol lactate  2.5 mg Intramuscular Q4H PRN Max 4/day HOLLI Lion      And    LORazepam  1 mg Intramuscular Q4H PRN Max 4/day HOLLI Lion      And    benztropine  0.5 mg Intramuscular Q4H PRN Max 4/day HOLLI Lion      benztropine  1 mg Intramuscular Q4H PRN Max 6/day Jordan C Holter,       haloperidol lactate  5 mg Intramuscular Q4H PRN Max 4/day HOLLI Lion      And    LORazepam  2 mg Intramuscular Q4H PRN Max 4/day HOLLI Lion      And    benztropine  1 mg Intramuscular Q4H PRN Max 4/day HOLLI Lion      benztropine  1 mg Oral Q4H PRN Max 6/day HOLLI Lion      benztropine  1 mg Oral Q4H PRN Max 6/day Jordan C Holter, DO      bisacodyl  10 mg Rectal Daily PRN HOLLI Lion      calcium carbonate  500 mg Oral BID PRN HOLLI Monge      cloZAPine  200 mg Oral Before Dinner Bora Rosario MD      cloZAPine  300 mg Oral HS Bora Rosario MD      cyanocobalamin  1,000 mcg Oral Daily Pia Mantilla, HOLLI      hydrOXYzine HCL  50 mg Oral Q6H PRN Max 4/day Jordan C Holter, DO      Or    diphenhydrAMINE  50 mg Intramuscular Q6H PRN Jordan C Holter, DO      hydrOXYzine HCL  50 mg Oral Q6H PRN Max 4/day  HOLLI Lion      Or    diphenhydrAMINE  50 mg Intramuscular Q6H PRN HOLLI Lion      diphenhydrAMINE-zinc acetate   Topical BID PRN HOLLI Lion      escitalopram  20 mg Oral Daily HOLLI Lion      famotidine  20 mg Oral BID Eileen GonzalezHOLLI evans      fluticasone  1 spray Each Nare Daily Brina Guillen MD      glycopyrrolate  1 mg Oral BID (AM & Afternoon) Bora Rosario MD      glycopyrrolate  2 mg Oral HS Bora Rosario MD      haloperidol  1 mg Oral Q6H PRN HOLLI Lion      haloperidol  2.5 mg Oral Q4H PRN Max 4/day HOLLI Lion      haloperidol  5 mg Oral Q4H PRN Max 4/day HOLLI Lion      hydroCHLOROthiazide  12.5 mg Oral Daily HOLLI Galvan      hydrocortisone   Topical 4x Daily PRN HOLLI Lion      hydrOXYzine HCL  100 mg Oral Q6H PRN Max 4/day Ion C Holter, DO      Or    LORazepam  2 mg Intramuscular Q6H PRN Ion C Holter, DO      hydrOXYzine HCL  100 mg Oral Q6H PRN Max 4/day HOLLI Lion      Or    LORazepam  2 mg Intramuscular Q6H PRN HOLLI Lion      hydrOXYzine HCL  25 mg Oral Q6H PRN Max 4/day Ion C Holter, DO      ibuprofen  600 mg Oral Q8H PRN HOLLI Lion      loratadine  10 mg Oral Daily Brina Guillen MD      melatonin  3 mg Oral HS PRN Bora Rosario MD      metFORMIN  500 mg Oral BID With Meals HOLLI Galvan      methocarbamol  500 mg Oral Q6H PRN HOLLI Lion      nicotine polacrilex  2 mg Oral Q4H PRN Bora Rosario MD      OLANZapine  5 mg Oral Q4H PRN Max 3/day Ion C Holter, DO      Or    OLANZapine  2.5 mg Intramuscular Q4H PRN Max 3/day Ion C Holter, DO      OLANZapine  5 mg Oral Q3H PRN Max 3/day Ion C Holter, DO      Or    OLANZapine  5 mg Intramuscular Q3H PRN Max 3/day Ion C Holter, DO      OLANZapine  2.5 mg Oral Q4H PRN Max 6/day Ion C Holter, DO      ondansetron  4 mg Oral Q6H PRN HOLLI Lion      pantoprazole  20 mg Oral QAM Jordan C Holter, DO       polyethylene glycol  17 g Oral Daily PRN HOLLI Ghotra      propranolol  10 mg Oral Q12H On license of [facility] HOLLI Lion      senna-docusate sodium  1 tablet Oral Daily PRN Jordan C Holter, DO      senna-docusate sodium  2 tablet Oral HS Melvina Keineelam,       traZODone  50 mg Oral HS PRN HOLLI Lion      white petrolatum-mineral oil   Topical TID PRN HOLLI Lion         Counseling / Coordination of Care: Total floor / unit time spent today 15 minutes. Greater than 50% of total time was spent with the patient and / or family counseling and / or somewhat receptive to supportive listening and teaching positive coping skills to deal with symptom mangement.     Patient's Rights, confidentiality and exceptions to confidentiality, use of automated medical record, [facility] staff access to medical record, and consent to treatment reviewed.    This note has been dictated and hence there may be problems with punctuation, spelling and formatting and if anyone has any concerns please address them to the writer.  This note is not shared with patient due to potential for making patient's condition worse by knowing the content of the note.    Eveline WALLACE

## 2024-09-20 PROCEDURE — 99232 SBSQ HOSP IP/OBS MODERATE 35: CPT | Performed by: PSYCHIATRY & NEUROLOGY

## 2024-09-20 RX ADMIN — NICOTINE POLACRILEX 2 MG: 2 GUM, CHEWING ORAL at 08:30

## 2024-09-20 RX ADMIN — PANTOPRAZOLE SODIUM 20 MG: 20 TABLET, DELAYED RELEASE ORAL at 08:41

## 2024-09-20 RX ADMIN — CYANOCOBALAMIN TAB 1000 MCG 1000 MCG: 1000 TAB at 08:41

## 2024-09-20 RX ADMIN — SENNOSIDES AND DOCUSATE SODIUM 2 TABLET: 8.6; 5 TABLET ORAL at 21:08

## 2024-09-20 RX ADMIN — CLOZAPINE 300 MG: 200 TABLET ORAL at 21:07

## 2024-09-20 RX ADMIN — ESCITALOPRAM OXALATE 20 MG: 10 TABLET, FILM COATED ORAL at 08:40

## 2024-09-20 RX ADMIN — NICOTINE POLACRILEX 2 MG: 2 GUM, CHEWING ORAL at 21:27

## 2024-09-20 RX ADMIN — METFORMIN HYDROCHLORIDE 500 MG: 500 TABLET, FILM COATED ORAL at 08:41

## 2024-09-20 RX ADMIN — CLOZAPINE 200 MG: 200 TABLET ORAL at 17:48

## 2024-09-20 RX ADMIN — PROPRANOLOL HYDROCHLORIDE 10 MG: 10 TABLET ORAL at 08:41

## 2024-09-20 RX ADMIN — METFORMIN HYDROCHLORIDE 500 MG: 500 TABLET, FILM COATED ORAL at 17:47

## 2024-09-20 RX ADMIN — GLYCOPYRROLATE 2 MG: 1 TABLET ORAL at 21:08

## 2024-09-20 RX ADMIN — GLYCOPYRROLATE 1 MG: 1 TABLET ORAL at 14:11

## 2024-09-20 RX ADMIN — ATROPINE SULFATE 1 DROP: 10 SOLUTION/ DROPS OPHTHALMIC at 21:08

## 2024-09-20 RX ADMIN — PROPRANOLOL HYDROCHLORIDE 10 MG: 10 TABLET ORAL at 21:08

## 2024-09-20 RX ADMIN — NICOTINE POLACRILEX 2 MG: 2 GUM, CHEWING ORAL at 17:48

## 2024-09-20 RX ADMIN — GLYCOPYRROLATE 1 MG: 1 TABLET ORAL at 08:41

## 2024-09-20 RX ADMIN — FAMOTIDINE 20 MG: 20 TABLET ORAL at 17:48

## 2024-09-20 RX ADMIN — NICOTINE POLACRILEX 2 MG: 2 GUM, CHEWING ORAL at 12:50

## 2024-09-20 RX ADMIN — LORATADINE 10 MG: 10 TABLET ORAL at 08:40

## 2024-09-20 RX ADMIN — FAMOTIDINE 20 MG: 20 TABLET ORAL at 08:41

## 2024-09-20 RX ADMIN — ARIPIPRAZOLE 15 MG: 15 TABLET ORAL at 08:40

## 2024-09-20 RX ADMIN — HYDROCHLOROTHIAZIDE 12.5 MG: 12.5 TABLET ORAL at 08:40

## 2024-09-20 NOTE — NURSING NOTE
Received pt in bed at change of shift with eyes closed; chest movement noted.  Continues the same thus this far as per q 7 min room checks.   Will continue to monitor behavior, sleeping pattern and any medical issues that may arise.    0542:  Sleeping 7+ hrs thus this far

## 2024-09-20 NOTE — PROGRESS NOTES
"Psychiatry Progress Note [facility]    Alberto Berumen 28 y.o. male MRN: [id number]  Unit/Bed#: -02 Encounter: [id number]  Code Status: Level 1 - Full Code    PCP: Joce Juan MD    Date of Admission:  3/29/2024 2008   Date of Service:  09/20/24    Patient Active Problem List   Diagnosis    GERD (gastroesophageal reflux disease)    Medical clearance for psychiatric admission    Schizoaffective disorder, bipolar type ([facility])    Tobacco abuse    T wave inversion in EKG    Syringoma    Chronic idiopathic constipation    Vitamin B 12 deficiency    Vitamin D deficiency    Confluent and reticulate papillomatosis    Class 2 obesity in adult    Primary hypertension    Elevated hemoglobin A1c    Bilateral lower extremity edema         Review of systems: No symptoms reported.  Psychiatric Diagnosis: Schizoaffective Disorder, Bipolar Type.    Assessment  Overall Status: Patient reported feeling tired again today and was found laying in bed during the interview. He reported sleeping well last night but still felt tired this morning after consuming breakfast. He reported that his mood was \"stable\" and rated his depression symptoms a 4-5/10 today. He continues to hear the threatening auditory hallucinations but reported that they have been quieter recently.  Certification Statement: The patient will continue to require additional inpatient hospital stay due to continued psychotic and severe symptoms of depression.      Assessment & Plan  Schizoaffective disorder, bipolar type (HCC)  Schizoaffective disorder, bipolar type  Continues to have depressive symptoms as well as hearing frequent, threatening auditory hallucinations.   Continues to have paranoid delusions and a blunted affect.  Appears to be making small improvements with his symptoms    Continue aripiprazole 15 mg tablet PO daily  Continue clozapine 200 mg tablet PO before dinner  Continue clozapine 300 mg tablet PO HS  Continue " escitalopram 20 mg tablet PO daily  Continue senna-docusate sodium two 50 mg tablets PO HS  Continue atropine 1% solution SL HS  Tobacco abuse    GERD (gastroesophageal reflux disease)  Continue famotidine 20 mg tablet BID per medical team  Continue glycopyrrolate 1 mg tablet BID per medical team  Continue pantoprazole EC 10 gm table po daily in the morning per medical team.    Primary hypertension  Continue amlodipine 5 mg tablet po daily per medical team  Continue hydrochlorothiazide 12.5 mg tablet po daily per medical team  Continue propranolol 10 mg tablet po Q 12 hours per medical team  Elevated hemoglobin A1c  Continue metformin 500 mg tablet po BID per medical team  Chronic idiopathic constipation  Continue senna docusate sodium two 50 mg tablet po HS   Class 2 obesity in adult           Medications:   Abilify 15 mg po daily, clozaril 200 mg po at dinner and 300 mg po HS, B12 1,000 mg po daily, lexapro 20 mg po daily, glycopyrrolate 1 mg BID and 2 mg po HS, propranolol 10 mg po BID, senna docusate sodium, 2 tablets po HS.  Side effects from treatment: Reported continued drooling overnight despite receiving atropine drops, but stated that the drooling has improved   Medication changes   None   Medication education  Risks side effects benefits and precautions of medications discussed with patient and he did verbalize an understanding about risks for metabolic syndrome from being on neuroleptics and is form tardive dyskinesia etc.  All medications reviewed and I recommend that they be continued for symptom management  Understanding of medications: Verbalized   Justification for dual anti-psychotics: Inability to control psychotic symptoms     Non-pharmacological treatments  Continue with individual, group, milieu and occupational therapy using recovery principles and psycho-education about accepting illness and the need for treatment.    Safety  Safety and communication plan established to target dynamic risk  "factors discussed above.    Discharge Plan   Aunt's house vs group home    Interval Progress   Patient reported sleeping well last night and not sleeping for less hours. He reported still feeling tired this morning and was found sleeping in his bed prior to the interview. He reported that he has a good appetite and had breakfast this morning and that his mood has been \"stable\". He reported that his depression is about a 4-5/10 today and he feels that way due to the voices he hears. He reported that he continues to hear the threatening auditory hallucinations but that they seem to be quieter than in the past. He denies any other changes to the voices. He denied any suicidal or homicidal thoughts, plans, or intents. He reported that he feels safe in the hospital despite feeling like the voices are out to get him. He stated he planned on attending some groups today.    Acceptance by patient: Yes   Hopefulness in recovery: Hoping to live with aunt   Involved in reintegration process: Aunt   Trusting in relationship with psychiatrist: Yes  Sleep: Good  Appetite: Good  Compliance with Medications: Good  Group attendance: 10/11 groups  Significant events: None reported      Mental Status Exam  Appearance: age appropriate, underweight, wearing hospital gown and scrub pants. Good eye contact  Behavior: cooperative, calm  Speech: normal rate, normal pitch, scant, soft  Mood: depressed  Affect:  depressed, flat, mood congruent  Thought Process: organized, linear  Thought Content: paranoid ideation, stating that the voices he hears are out to get him although he reports feeling safe in the hospital. He noted that the auditory hallucinations are improving since admission and that they are quieter than they previously were. He denied any suicidal or homicidal thoughts, plans, or intents. No obsessions, compulsions, or distorted perceptions verbalized. He denies any other experiences that would be categorized as grandiose, " "somatic, or bizarre delusions.   Perceptual Disturbances: auditory hallucinations that he hears telling him that they are going to shoot him and his family. He reported that recently the voices have sounded quieter to him but that they continue to \"come and go\" everyday. He continues to report that they sound like a stranger or his ex girlfriend., denies visual hallucinations when asked, does not appear responding to internal stimuli  Hx Risk Factors: chronic psychiatric problems, chronic depressive symptoms, chronic psychotic symptoms  Sensorium: alert and oriented to person, place, time and situation  Cognition: recent and remote memory grossly intact  Consciousness: alert and awake  Attention: attention span and concentration are age appropriate  Intellect: appears to be of average intelligence  Insight: limited  Judgement: impaired  Motor Activity: no abnormal movements     Vitals  Temp:  [97.5 °F (36.4 °C)-98.2 °F (36.8 °C)] 98.2 °F (36.8 °C)  HR:  [] 89  Resp:  [18] 18  BP: (114-134)/(72-87) 114/75  SpO2:  [99 %-100 %] 99 %  No intake or output data in the 24 hours ending 09/20/24 1002    Lab Results: All Labs For Current Hospital Admission Reviewed    Current Facility-Administered Medications   Medication Dose Route Frequency Provider Last Rate    acetaminophen  650 mg Oral Q4H PRN Jordan C Holter, DO      acetaminophen  650 mg Oral Q6H PRN HOLLI Lion      aluminum-magnesium hydroxide-simethicone  30 mL Oral Q4H PRN Jordan C Holter, DO      amLODIPine  5 mg Oral Daily HOLLI Lion      ARIPiprazole  15 mg Oral Daily Bora Rosario MD      Artificial Tears  1 drop Both Eyes Q3H PRN Jordan C Holter, DO      atropine  1 drop Sublingual HS Harjeet Torres,       haloperidol lactate  2.5 mg Intramuscular Q4H PRN Max 4/day HOLLI Lion      And    LORazepam  1 mg Intramuscular Q4H PRN Max 4/day HOLLI Lion      And    benztropine  0.5 mg Intramuscular Q4H PRN Max 4/day Eevline" Gilbert, HOLLI      benztropine  1 mg Intramuscular Q4H PRN Max 6/day Ion FISCHER Holter, DO      haloperidol lactate  5 mg Intramuscular Q4H PRN Max 4/day HOLLI Lion      And    LORazepam  2 mg Intramuscular Q4H PRN Max 4/day STEVE LionNP      And    benztropine  1 mg Intramuscular Q4H PRN Max 4/day HOLLI Lion      benztropine  1 mg Oral Q4H PRN Max 6/day HOLLI Lion      benztropine  1 mg Oral Q4H PRN Max 6/day Ion FISCHER Holter, DO      bisacodyl  10 mg Rectal Daily PRN HOLLI Lion      calcium carbonate  500 mg Oral BID PRN HOLLI Monge      cloZAPine  200 mg Oral Before Dinner Bora Rosario MD      cloZAPine  300 mg Oral HS Bora Rosario MD      cyanocobalamin  1,000 mcg Oral Daily Pia Mantilla, HOLLI      hydrOXYzine HCL  50 mg Oral Q6H PRN Max 4/day Jordan C Holter, DO      Or    diphenhydrAMINE  50 mg Intramuscular Q6H PRN Jordan C Holter, DO      hydrOXYzine HCL  50 mg Oral Q6H PRN Max 4/day HOLLI Lion      Or    diphenhydrAMINE  50 mg Intramuscular Q6H PRN HOLLI Lion      diphenhydrAMINE-zinc acetate   Topical BID PRN HOLLI Lion      escitalopram  20 mg Oral Daily HOLLI Lion      famotidine  20 mg Oral BID HOLLI Monge      fluticasone  1 spray Each Nare Daily Brina Guillen MD      glycopyrrolate  1 mg Oral BID (AM & Afternoon) Bora Rosario MD      glycopyrrolate  2 mg Oral HS Bora Rosario MD      haloperidol  1 mg Oral Q6H PRN HOLLI Lion      haloperidol  2.5 mg Oral Q4H PRN Max 4/day HOLLI Lion      haloperidol  5 mg Oral Q4H PRN Max 4/day HOLLI Lion      hydroCHLOROthiazide  12.5 mg Oral Daily HOLLI Galvan      hydrocortisone   Topical 4x Daily PRN HOLLI Lion      hydrOXYzine HCL  100 mg Oral Q6H PRN Max 4/day Jordan C Holter,       Or    LORazepam  2 mg Intramuscular Q6H PRN Jordan C Holter, DO      hydrOXYzine HCL  100 mg Oral Q6H PRN Max 4/day Eveline  HOLLI Hunt      Or    LORazepam  2 mg Intramuscular Q6H PRN HOLLI Lion      hydrOXYzine HCL  25 mg Oral Q6H PRN Max 4/day [facility] Holter, DO      ibuprofen  600 mg Oral Q8H PRN HOLLI Lion      loratadine  10 mg Oral Daily Brina Guillen MD      melatonin  3 mg Oral HS PRN Bora Rosario MD      metFORMIN  500 mg Oral BID With Meals HOLLI Galvan      methocarbamol  500 mg Oral Q6H PRN HOLLI Lion      nicotine polacrilex  2 mg Oral Q4H PRN Bora Rosario MD      OLANZapine  5 mg Oral Q4H PRN Max 3/day [facility] Holter, DO      Or    OLANZapine  2.5 mg Intramuscular Q4H PRN Max 3/day [facility] Holter, DO      OLANZapine  5 mg Oral Q3H PRN Max 3/day [facility] Holter, DO      Or    OLANZapine  5 mg Intramuscular Q3H PRN Max 3/day [facility] Holter, DO      OLANZapine  2.5 mg Oral Q4H PRN Max 6/day [facility] Holter, DO      ondansetron  4 mg Oral Q6H PRN HOLLI Lion      pantoprazole  20 mg Oral QAM [facility] Holter, DO      polyethylene glycol  17 g Oral Daily PRN HOLLI Ghotra      propranolol  10 mg Oral Q12H KAYLIE HOLLI Lion      senna-docusate sodium  1 tablet Oral Daily PRN [facility] Holter, DO      senna-docusate sodium  2 tablet Oral HS Melvina Ambron, DO      traZODone  50 mg Oral HS PRN HOLLI Lion      white petrolatum-mineral oil   Topical TID PRN HOLLI Lion         Counseling / Coordination of Care: Total floor / unit time spent today 15 minutes. Greater than 50% of total time was spent with the patient and / or family counseling and / or somewhat receptive to supportive listening and teaching positive coping skills to deal with symptom mangement.     Patient's Rights, confidentiality and exceptions to confidentiality, use of automated medical record, [facility] Services staff access to medical record, and consent to treatment reviewed.    This note has been dictated and hence there may be problems with punctuation, spelling and formatting  and if anyone has any concerns please address them to the writer.  This note is not shared with patient due to potential for making patient's condition worse by knowing the content of the note.    Eveline WALLACE

## 2024-09-20 NOTE — SOCIAL WORK
SW met 1:1 with pt  Pt reports his aunt continues to assert that he is able to go home with her. SW communicated being unable to connect with aunt despite multiple attempts. SW encouraged pt to ask aunt to call SW if they speak.   Pt remains flat, blunted. Pt reports not fully being on board with CRR due to not wanting to wait for months. SW validated concerns.

## 2024-09-20 NOTE — NURSING NOTE
"Patient is calm with a blunted and depressed affect, less blunted with bright affect during conversation, says he feels the last ECT session \"helped a little.\" Visible on the milieu walking halls during the evening. Reports ongoing depression and AH of multiple voices saying \"they're going to kill me when I get out, and my family.\" Says the voices are less intense at this time. Denies SI/HI/VH. No unmet needs at this time.   "

## 2024-09-20 NOTE — PROGRESS NOTES
09/20/24 0749   Team Meeting   Meeting Type Daily Rounds   Team Members Present   Team Members Present Physician;Nurse;;Other (Discipline and Name)   Patient/Family Present   Patient Present No   Patient's Family Present No     In attendance:  Dr. Alex Thomas, MD Dr. Jordan Holter, DO Guy Taylor, RN  Luisana Alvarado, [facility]  [facility]    Groups: 10/11    Pt had labs completed yesterday. No bx issues noted. Pt visible, appropriate. Pt cooperative. CRR waitlist.

## 2024-09-20 NOTE — NURSING NOTE
"Writer attempted several times to wake patient for lunch. Patient stated \" I am coming.\" Patient woke  "

## 2024-09-20 NOTE — NURSING NOTE
Patient has been visible on the unit most of the shift.  Affect flat. Depressed.  Quiet and calm. Unmotivated. Social with select female peer [place]. Medication compliant. Denies suicidal ideations. Admits having auditory hallucinations telling him they are going to kill him and his family. Support offered. Appetite excellent. Attends all the groups today. Pleasant and polite. Lab work obtained this evening.

## 2024-09-20 NOTE — PLAN OF CARE
Problem: Alteration in Thoughts and Perception  Goal: Treatment Goal: Gain control of psychotic behaviors/thinking, reduce/eliminate presenting symptoms and demonstrate improved reality functioning upon discharge  Outcome: Progressing  Goal: Verbalize thoughts and feelings  Description: Interventions:  - Promote a nonjudgmental and trusting relationship with the patient through active listening and therapeutic communication  - Assess patient's level of functioning, behavior and potential for risk  - Engage patient in 1 on 1 interactions  - Encourage patient to express fears, feelings, frustrations, and discuss symptoms    - [place] patient to reality, help patient recognize reality-based thinking   - Administer medications as ordered and assess for potential side effects  - Provide the patient education related to the signs and symptoms of the illness and desired effects of prescribed medications  Outcome: Progressing  Goal: Refrain from acting on delusional thinking/internal stimuli  Description: Interventions:  - Monitor patient closely, per order   - Utilize least restrictive measures   - Set reasonable limits, give positive feedback for acceptable   - Administer medications as ordered and monitor of potential side effects  Outcome: Progressing  Goal: Agree to be compliant with medication regime, as prescribed and report medication side effects  Description: Interventions:  - Offer appropriate PRN medication and supervise ingestion; conduct AIMS, as needed   Outcome: Progressing  Goal: Attend and participate in unit activities, including therapeutic, recreational, and educational groups  Description: Interventions:  -Encourage Visitation and family involvement in care  Outcome: Progressing  Goal: Complete daily ADLs, including personal hygiene independently, as able  Description: Interventions:  - Observe, teach, and assist patient with ADLS  - Monitor and promote a balance of rest/activity, with adequate  nutrition and elimination   Outcome: Progressing     Problem: Ineffective Coping  Goal: Identifies ineffective coping skills  Outcome: Progressing  Goal: Identifies healthy coping skills  Outcome: Progressing  Goal: Participates in unit activities  Description: Interventions:  - Provide therapeutic environment   - Provide required programming   - Redirect inappropriate behaviors   Outcome: Progressing  Goal: Patient/Family participate in treatment and DC plans  Description: Interventions:  - Provide therapeutic environment  Outcome: Progressing  Goal: Patient/Family verbalizes awareness of resources  Outcome: Progressing     Problem: Depression  Goal: Verbalize thoughts and feelings  Description: Interventions:  - Assess and re-assess patient's level of risk   - Engage patient in 1:1 interactions, daily, for a minimum of 15 minutes   - Encourage patient to express feelings, fears, frustrations, hopes   Outcome: Progressing  Goal: Refrain from harming self  Description: Interventions:  - Monitor patient closely, per order   - Supervise medication ingestion, monitor effects and side effects   Outcome: Progressing  Goal: Refrain from isolation  Description: Interventions:  - Develop a trusting relationship   - Encourage socialization   Outcome: Progressing  Goal: Attend and participate in unit activities, including therapeutic, recreational, and educational groups  Description: Interventions:  - Provide therapeutic and educational activities daily, encourage attendance and participation, and document same in the medical record   Outcome: Progressing  Goal: Complete daily ADLs, including personal hygiene independently, as able  Description: Interventions:  - Observe, teach, and assist patient with ADLS  -  Monitor and promote a balance of rest/activity, with adequate nutrition and elimination   Outcome: Progressing     Problem: Anxiety  Goal: Anxiety is at manageable level  Description: Interventions:  - Assess and  monitor patient's anxiety level.   - Monitor for signs and symptoms (heart palpitations, chest pain, shortness of breath, headaches, nausea, feeling jumpy, restlessness, irritable, apprehensive).   - Collaborate with interdisciplinary team and initiate plan and interventions as ordered.  - Todd patient to unit/surroundings  - Explain treatment plan  - Encourage participation in care  - Encourage verbalization of concerns/fears  - Identify coping mechanisms  - Assist in developing anxiety-reducing skills  - Administer/offer alternative therapies  - Limit or eliminate stimulants  Outcome: Progressing     Problem: Alteration in Orientation  Goal: Treatment Goal: Demonstrate a reduction of confusion and improved orientation to person, place, time and/or situation upon discharge, according to optimum baseline/ability  Outcome: Progressing  Goal: Interact with staff daily  Description: Interventions:  - Assess and re-assess patient's level of orientation  - Engage patient in 1 on 1 interactions, daily, for a minimum of 15 minutes   - Establish rapport/trust with patient   Outcome: Progressing  Goal: Attend and participate in unit activities, including therapeutic, recreational, and educational groups  Description: Interventions:  - Provide therapeutic and educational activities daily, encourage attendance and participation, and document same in the medical record   - Provide appropriate opportunities for reminiscence   - Provide a consistent daily routine   - Encourage family contact/visitation   Outcome: Progressing  Goal: Complete daily ADLs, including personal hygiene independently, as able  Description: Interventions:  - Observe, teach, and assist patient with ADLS  Outcome: Progressing     Problem: DISCHARGE PLANNING - CARE MANAGEMENT  Goal: Discharge to post-acute care or home with appropriate resources  Description: INTERVENTIONS:  - Conduct assessment to determine patient/family and health care team treatment  goals, and need for post-acute services based on payer coverage, community resources, and patient preferences, and barriers to discharge  - Address psychosocial, clinical, and financial barriers to discharge as identified in assessment in conjunction with the patient/family and health care team  - Arrange appropriate level of post-acute services according to patient's   needs and preference and payer coverage in collaboration with the physician and health care team  - Communicate with and update the patient/family, physician, and health care team regarding progress on the discharge plan  - Arrange appropriate transportation to post-acute venues  Outcome: Progressing

## 2024-09-21 PROCEDURE — 99232 SBSQ HOSP IP/OBS MODERATE 35: CPT

## 2024-09-21 RX ADMIN — METFORMIN HYDROCHLORIDE 500 MG: 500 TABLET, FILM COATED ORAL at 17:28

## 2024-09-21 RX ADMIN — CLOZAPINE 300 MG: 200 TABLET ORAL at 21:16

## 2024-09-21 RX ADMIN — NICOTINE POLACRILEX 2 MG: 2 GUM, CHEWING ORAL at 17:28

## 2024-09-21 RX ADMIN — CYANOCOBALAMIN TAB 1000 MCG 1000 MCG: 1000 TAB at 08:51

## 2024-09-21 RX ADMIN — FAMOTIDINE 20 MG: 20 TABLET ORAL at 08:51

## 2024-09-21 RX ADMIN — ESCITALOPRAM OXALATE 20 MG: 10 TABLET, FILM COATED ORAL at 08:51

## 2024-09-21 RX ADMIN — PANTOPRAZOLE SODIUM 20 MG: 20 TABLET, DELAYED RELEASE ORAL at 08:51

## 2024-09-21 RX ADMIN — ARIPIPRAZOLE 15 MG: 15 TABLET ORAL at 08:51

## 2024-09-21 RX ADMIN — LORATADINE 10 MG: 10 TABLET ORAL at 08:51

## 2024-09-21 RX ADMIN — GLYCOPYRROLATE 1 MG: 1 TABLET ORAL at 08:51

## 2024-09-21 RX ADMIN — AMLODIPINE BESYLATE 5 MG: 5 TABLET ORAL at 08:51

## 2024-09-21 RX ADMIN — NICOTINE POLACRILEX 2 MG: 2 GUM, CHEWING ORAL at 13:28

## 2024-09-21 RX ADMIN — METFORMIN HYDROCHLORIDE 500 MG: 500 TABLET, FILM COATED ORAL at 08:51

## 2024-09-21 RX ADMIN — GLYCOPYRROLATE 1 MG: 1 TABLET ORAL at 13:28

## 2024-09-21 RX ADMIN — GLYCOPYRROLATE 2 MG: 1 TABLET ORAL at 21:17

## 2024-09-21 RX ADMIN — FLUTICASONE PROPIONATE 1 SPRAY: 50 SPRAY, METERED NASAL at 09:15

## 2024-09-21 RX ADMIN — SENNOSIDES AND DOCUSATE SODIUM 2 TABLET: 8.6; 5 TABLET ORAL at 21:16

## 2024-09-21 RX ADMIN — NICOTINE POLACRILEX 2 MG: 2 GUM, CHEWING ORAL at 08:51

## 2024-09-21 RX ADMIN — FAMOTIDINE 20 MG: 20 TABLET ORAL at 17:28

## 2024-09-21 RX ADMIN — CLOZAPINE 200 MG: 200 TABLET ORAL at 16:42

## 2024-09-21 RX ADMIN — PROPRANOLOL HYDROCHLORIDE 10 MG: 10 TABLET ORAL at 21:17

## 2024-09-21 RX ADMIN — ATROPINE SULFATE 1 DROP: 10 SOLUTION/ DROPS OPHTHALMIC at 21:50

## 2024-09-21 RX ADMIN — NICOTINE POLACRILEX 2 MG: 2 GUM, CHEWING ORAL at 21:18

## 2024-09-21 NOTE — NURSING NOTE
Alert, cooperative and visible intermittently. No SI or HI noted. Denies depression, anxiety and pain. Attended exercise, coping skills and nursing group. Consumed 100% of breakfast and 25% of lunch. Took all medication without prompting. Nicotine gum administered as as ordered. Maintained on safe precautions without incident. Will continue to monitor progress and recovery program.

## 2024-09-21 NOTE — NURSING NOTE
Received pt in bed at change of shift with eyes closed; chest movement noted.  Continues the same thus this far as per q 7 min room checks.   Will continue to monitor behavior, sleeping pattern and any medical issues that may arise.    0543;  Sleeping 7+ hrs thus this far

## 2024-09-21 NOTE — NURSING NOTE
Patient gave peer his dessert to which he admitted he did. Explained peer is on special diet and this is not allowed. Patient verbalized understanding. BHT explained he could no longer sit with this peer during meals.

## 2024-09-21 NOTE — PROGRESS NOTES
Psychiatric Progress Note - Department of Behavioral Health   Alberto Berumen 28 y.o. male MRN: [id number]  Unit/Bed#: [facility] 108-02 Encounter: [id number]    ASSESSMENT & PLAN     Principal Problem:    Schizoaffective disorder, bipolar type (HCC)  Active Problems:    GERD (gastroesophageal reflux disease)    Medical clearance for psychiatric admission    Tobacco abuse    T wave inversion in EKG    Chronic idiopathic constipation    Confluent and reticulate papillomatosis    Class 2 obesity in adult    Primary hypertension    Elevated hemoglobin A1c    Bilateral lower extremity edema      Continue to promote patient participation in therapeutic milieu.  Continue medical management per medicine.  Continue previously prescribed psychotropic medication regimen; see below.  Continue behavioral health checks q.7 minutes.   Continue vitals per behavioral health unit protocol.  Discharge date per primary team    SUBJECTIVE     Patient evaluated this a.m. for continuity of care. Patient was discussed at length with nursing and treatment team.  Per nursing report Alberto has been visible, however yesterday he slept most of the day.  He had his last ECT treatment yesterday.  PRNs administered: 9/21 Nicorette gum at 0851    No acute distress is noted throughout evaluation.  Alberto was sitting in the back lounge on approach.  He states his last ECT went well and has no complaints.  He denies anxiety this morning but does report depression which he rates a 5/10 on the severity scale with 10 being the most severe.  He reports auditory hallucinations of voices that he states are not commanding in nature and are typically negative statements towards him.  Alberto Berumen denies suicidal/homicidal ideation; contracts for safety.  He does not endorse any pain.  His appetite is adequate and he states he had trouble falling asleep last night but he did eventually sleep.    PSYCHIATRIC REVIEW OF SYSTEMS     Behavior over the last 24  hours:  unchanged  Sleep: normal  Appetite: normal  Medication side effects: No    REVIEW OF SYSTEMS   Review of systems: no complaints    OBJECTIVE     Vital Signs in Past 24 Hours:  Temp:  [97.2 °F (36.2 °C)-97.6 °F (36.4 °C)] 97.2 °F (36.2 °C)  HR:  [] 111  Resp:  [16-18] 18  BP: (103-158)/(56-89) 103/56    Intake/Output in Past 24 hours:  No intake/output data recorded.  No intake/output data recorded.        Laboratory Results:  I have personally reviewed all pertinent laboratory/tests results.    Behavioral Health Medications: all current active meds have been reviewed and continue current psychiatric medications.    Current Facility-Administered Medications   Medication Dose Route Frequency Provider Last Rate    acetaminophen  650 mg Oral Q4H PRN Jordan C Holter,       acetaminophen  650 mg Oral Q6H PRN HOLLI Lion      aluminum-magnesium hydroxide-simethicone  30 mL Oral Q4H PRN Jordan C Holter,       amLODIPine  5 mg Oral Daily HOLLI Lion      ARIPiprazole  15 mg Oral Daily Bora Rosario MD      Artificial Tears  1 drop Both Eyes Q3H PRN Jordan C Holter, DO      atropine  1 drop Sublingual HS Harjeet Torres,       haloperidol lactate  2.5 mg Intramuscular Q4H PRN Max 4/day HOLLI Lion      And    LORazepam  1 mg Intramuscular Q4H PRN Max 4/day HOLLI Lion      And    benztropine  0.5 mg Intramuscular Q4H PRN Max 4/day HOLLI Lion      benztropine  1 mg Intramuscular Q4H PRN Max 6/day Jordan C Holter, DO      haloperidol lactate  5 mg Intramuscular Q4H PRN Max 4/day HOLLI Lion      And    LORazepam  2 mg Intramuscular Q4H PRN Max 4/day HOLLI Lion      And    benztropine  1 mg Intramuscular Q4H PRN Max 4/day HOLLI Lion      benztropine  1 mg Oral Q4H PRN Max 6/day HOLLI Lion      benztropine  1 mg Oral Q4H PRN Max 6/day Jordan C Holter,       bisacodyl  10 mg Rectal Daily PRN HOLLI Lion      calcium carbonate  500 mg  Oral BID PRN HOLLI Monge      cloZAPine  200 mg Oral Before Dinner Bora Rosario MD      cloZAPine  300 mg Oral HS Bora Rosario MD      cyanocobalamin  1,000 mcg Oral Daily HOLLI Galvan      hydrOXYzine HCL  50 mg Oral Q6H PRN Max 4/day Ion C Holter, DO      Or    diphenhydrAMINE  50 mg Intramuscular Q6H PRN Ion C Holter, DO      hydrOXYzine HCL  50 mg Oral Q6H PRN Max 4/day HOLLI Lion      Or    diphenhydrAMINE  50 mg Intramuscular Q6H PRN HOLLI Lion      diphenhydrAMINE-zinc acetate   Topical BID PRN HOLLI Lion      escitalopram  20 mg Oral Daily HOLLI Lion      famotidine  20 mg Oral BID HOLLI Monge      fluticasone  1 spray Each Nare Daily Brina Guillen MD      glycopyrrolate  1 mg Oral BID (AM & Afternoon) Bora Rosario MD      glycopyrrolate  2 mg Oral HS Bora Rosario MD      haloperidol  1 mg Oral Q6H PRN HOLLI Lion      haloperidol  2.5 mg Oral Q4H PRN Max 4/day HOLLI Lion      haloperidol  5 mg Oral Q4H PRN Max 4/day HOLLI Lion      hydroCHLOROthiazide  12.5 mg Oral Daily HOLLI Galvan      hydrocortisone   Topical 4x Daily PRN HOLLI Lion      hydrOXYzine HCL  100 mg Oral Q6H PRN Max 4/day Ion FISCHER Holter, DO      Or    LORazepam  2 mg Intramuscular Q6H PRN Ion C Holter, DO      hydrOXYzine HCL  100 mg Oral Q6H PRN Max 4/day HOLLI Lion      Or    LORazepam  2 mg Intramuscular Q6H PRN HOLLI Lion      hydrOXYzine HCL  25 mg Oral Q6H PRN Max 4/day Ion C Holter, DO      ibuprofen  600 mg Oral Q8H PRN HOLLI Lion      loratadine  10 mg Oral Daily Brina Guillen MD      melatonin  3 mg Oral HS PRN Bora Rosario MD      metFORMIN  500 mg Oral BID With Meals HOLLI Galvan      methocarbamol  500 mg Oral Q6H PRN HOLLI Lion      nicotine polacrilex  2 mg Oral Q4H PRN Bora Rosario MD      OLANZapine  5 mg Oral Q4H PRN Max 3/day Ion FISCHER  Holter, DO      Or    OLANZapine  2.5 mg Intramuscular Q4H PRN Max 3/day [facility] Holter, DO      OLANZapine  5 mg Oral Q3H PRN Max 3/day [facility] Holter, DO      Or    OLANZapine  5 mg Intramuscular Q3H PRN Max 3/day [facility] Holter, DO      OLANZapine  2.5 mg Oral Q4H PRN Max 6/day [facility] Holter, DO      ondansetron  4 mg Oral Q6H PRN HOLLI Lion      pantoprazole  20 mg Oral QAM [facility] Holter, DO      polyethylene glycol  17 g Oral Daily PRN HOLLI Ghotra      propranolol  10 mg Oral Q12H KAYLIE HOLLI Lion      senna-docusate sodium  1 tablet Oral Daily PRN [facility] Holter, DO      senna-docusate sodium  2 tablet Oral HS Melvina Ambron, DO      traZODone  50 mg Oral HS PRN HOLLI Lion      white petrolatum-mineral oil   Topical TID PRN HOLLI Lion         Risks, benefits and possible side effects of Medications:   Risks, benefits, and possible side effects of medications explained to patient and patient verbalizes understanding.          Mental Status Evaluation:  Appearance:  age appropriate and wearing hospital gown and a sweatshirt   Behavior:  Calm and cooperative   Speech:  normal pitch and normal volume   Mood:  depressed   Affect:  flat and mood-congruent   Language sparse   Thought Process:  Organized   Thought Content:  paranoid ideation, stating that the voices he hears are typically negative statements directed towards Alberto.. He denied any suicidal or homicidal thoughts, plans, or intents. No obsessions, compulsions, or distorted perceptions verbalized. He denies any other experiences that would be categorized as grandiose, somatic, or bizarre delusions.    Perceptual Disturbances: Auditory hallucinations without commands   Risk Potential: Suicidal Ideations none, Homicidal Ideations none, and Potential for Aggression No   Sensorium:  person, place, and time/date   Cognition:  recent and remote memory grossly intact   Consciousness:  alert and awake    Attention:  attention span appeared shorter than expected for age   Insight:  limited   Judgment: limited   Intellect Not assessed   Gait/Station: normal gait/station and normal balance   Motor Activity: no abnormal movements     Memory: Short and long term memory fair     Progress Toward Goals: Progressing, as evidenced by their participation (or lack thereof) in individual, social and therapeutic milieu in addition to adherence to their medication regimen.  Patient Acceptance: Progressing  Patient Understanding: Progressing  Patient Involvement in Reintegration Process: Progressing  Patient's Therapeutic Relationship with Psychiatrist: Somewhat guarded  Patient's Optimism Regarding Recovery: Progressing    Recommended Treatment:   See above for assessment and plan.    Inpatient Psychiatric Certification: Based upon physical, mental and social evaluations, I certify that inpatient psychiatric services are medically necessary for this patient for a duration of 2 midnights for the treatment of Schizoaffective disorder, bipolar type (HCC)    Counseling/Coordination of Care    Total unit time spent today ws greater than 15 minutes. Greater than 50% of total time was spent with the patient and/or patient's relatives and/or coordination of patient's care.     Patient's rights, confidentiality, exceptions to confidentiality, use of electronic medical record including appropriate staff access to medical record regarding behavioral health services and consent to treatment were reviewed.    Note Share:     This note was not shared with the patient due to reasonable likelihood of causing patient harm     This note has been constructed using a voice recognition system.    There may be translation, syntax, or grammatical errors. If you have any questions, please contact the dictating provider.    HOLLI Stoddard

## 2024-09-21 NOTE — PLAN OF CARE
Problem: Alteration in Thoughts and Perception  Goal: Treatment Goal: Gain control of psychotic behaviors/thinking, reduce/eliminate presenting symptoms and demonstrate improved reality functioning upon discharge  Outcome: Progressing  Goal: Verbalize thoughts and feelings  Description: Interventions:  - Promote a nonjudgmental and trusting relationship with the patient through active listening and therapeutic communication  - Assess patient's level of functioning, behavior and potential for risk  - Engage patient in 1 on 1 interactions  - Encourage patient to express fears, feelings, frustrations, and discuss symptoms    - [place] patient to reality, help patient recognize reality-based thinking   - Administer medications as ordered and assess for potential side effects  - Provide the patient education related to the signs and symptoms of the illness and desired effects of prescribed medications  Outcome: Progressing  Goal: Agree to be compliant with medication regime, as prescribed and report medication side effects  Description: Interventions:  - Offer appropriate PRN medication and supervise ingestion; conduct AIMS, as needed   Outcome: Progressing  Goal: Attend and participate in unit activities, including therapeutic, recreational, and educational groups  Description: Interventions:  -Encourage Visitation and family involvement in care  Outcome: Progressing  Goal: Complete daily ADLs, including personal hygiene independently, as able  Description: Interventions:  - Observe, teach, and assist patient with ADLS  - Monitor and promote a balance of rest/activity, with adequate nutrition and elimination   Outcome: Progressing     Problem: Ineffective Coping  Goal: Participates in unit activities  Description: Interventions:  - Provide therapeutic environment   - Provide required programming   - Redirect inappropriate behaviors   Outcome: Progressing  Goal: Patient/Family verbalizes awareness of resources  Outcome:  Progressing     Problem: Depression  Goal: Treatment Goal: Demonstrate behavioral control of depressive symptoms, verbalize feelings of improved mood/affect, and adopt new coping skills prior to discharge  Outcome: Progressing  Goal: Verbalize thoughts and feelings  Description: Interventions:  - Assess and re-assess patient's level of risk   - Engage patient in 1:1 interactions, daily, for a minimum of 15 minutes   - Encourage patient to express feelings, fears, frustrations, hopes   Outcome: Progressing  Goal: Refrain from harming self  Description: Interventions:  - Monitor patient closely, per order   - Supervise medication ingestion, monitor effects and side effects   Outcome: Progressing  Goal: Refrain from isolation  Description: Interventions:  - Develop a trusting relationship   - Encourage socialization   Outcome: Progressing  Goal: Refrain from self-neglect  Outcome: Progressing  Goal: Attend and participate in unit activities, including therapeutic, recreational, and educational groups  Description: Interventions:  - Provide therapeutic and educational activities daily, encourage attendance and participation, and document same in the medical record   Outcome: Progressing  Goal: Complete daily ADLs, including personal hygiene independently, as able  Description: Interventions:  - Observe, teach, and assist patient with ADLS  -  Monitor and promote a balance of rest/activity, with adequate nutrition and elimination   Outcome: Progressing     Problem: Anxiety  Goal: Anxiety is at manageable level  Description: Interventions:  - Assess and monitor patient's anxiety level.   - Monitor for signs and symptoms (heart palpitations, chest pain, shortness of breath, headaches, nausea, feeling jumpy, restlessness, irritable, apprehensive).   - Collaborate with interdisciplinary team and initiate plan and interventions as ordered.  - [place] patient to unit/surroundings  - Explain treatment plan  - Encourage  participation in care  - Encourage verbalization of concerns/fears  - Identify coping mechanisms  - Assist in developing anxiety-reducing skills  - Administer/offer alternative therapies  - Limit or eliminate stimulants  Outcome: Progressing     Problem: Alteration in Orientation  Goal: Treatment Goal: Demonstrate a reduction of confusion and improved orientation to person, place, time and/or situation upon discharge, according to optimum baseline/ability  Outcome: Progressing  Goal: Interact with staff daily  Description: Interventions:  - Assess and re-assess patient's level of orientation  - Engage patient in 1 on 1 interactions, daily, for a minimum of 15 minutes   - Establish rapport/trust with patient   Outcome: Progressing  Goal: Allow medical examinations, as recommended  Description: Interventions:  - Provide physical/neurological exams and/or referrals, per provider   Outcome: Progressing  Goal: Cooperate with recommended testing/procedures  Description: Interventions:  - Determine need for ancillary testing  - Observe for mental status changes  - Implement falls/precaution protocol   Outcome: Progressing  Goal: Attend and participate in unit activities, including therapeutic, recreational, and educational groups  Description: Interventions:  - Provide therapeutic and educational activities daily, encourage attendance and participation, and document same in the medical record   - Provide appropriate opportunities for reminiscence   - Provide a consistent daily routine   - Encourage family contact/visitation   Outcome: Progressing  Goal: Complete daily ADLs, including personal hygiene independently, as able  Description: Interventions:  - Observe, teach, and assist patient with ADLS  Outcome: Progressing     Problem: DISCHARGE PLANNING - CARE MANAGEMENT  Goal: Discharge to post-acute care or home with appropriate resources  Description: INTERVENTIONS:  - Conduct assessment to determine patient/family and  health care team treatment goals, and need for post-acute services based on payer coverage, community resources, and patient preferences, and barriers to discharge  - Address psychosocial, clinical, and financial barriers to discharge as identified in assessment in conjunction with the patient/family and health care team  - Arrange appropriate level of post-acute services according to patient's   needs and preference and payer coverage in collaboration with the physician and health care team  - Communicate with and update the patient/family, physician, and health care team regarding progress on the discharge plan  - Arrange appropriate transportation to post-acute venues  Outcome: Progressing

## 2024-09-22 VITALS
WEIGHT: 249 LBS | RESPIRATION RATE: 18 BRPM | HEIGHT: 66 IN | BODY MASS INDEX: 40.02 KG/M2 | DIASTOLIC BLOOD PRESSURE: 85 MMHG | OXYGEN SATURATION: 100 % | HEART RATE: 100 BPM | SYSTOLIC BLOOD PRESSURE: 129 MMHG | TEMPERATURE: 97.8 F

## 2024-09-22 PROCEDURE — 99232 SBSQ HOSP IP/OBS MODERATE 35: CPT

## 2024-09-22 RX ADMIN — ARIPIPRAZOLE 15 MG: 15 TABLET ORAL at 09:16

## 2024-09-22 RX ADMIN — METFORMIN HYDROCHLORIDE 500 MG: 500 TABLET, FILM COATED ORAL at 17:32

## 2024-09-22 RX ADMIN — FAMOTIDINE 20 MG: 20 TABLET ORAL at 09:15

## 2024-09-22 RX ADMIN — SENNOSIDES AND DOCUSATE SODIUM 2 TABLET: 8.6; 5 TABLET ORAL at 21:14

## 2024-09-22 RX ADMIN — ATROPINE SULFATE 1 DROP: 10 SOLUTION/ DROPS OPHTHALMIC at 22:04

## 2024-09-22 RX ADMIN — ESCITALOPRAM OXALATE 20 MG: 10 TABLET, FILM COATED ORAL at 09:17

## 2024-09-22 RX ADMIN — PROPRANOLOL HYDROCHLORIDE 10 MG: 10 TABLET ORAL at 21:14

## 2024-09-22 RX ADMIN — GLYCOPYRROLATE 1 MG: 1 TABLET ORAL at 09:17

## 2024-09-22 RX ADMIN — NICOTINE POLACRILEX 2 MG: 2 GUM, CHEWING ORAL at 09:17

## 2024-09-22 RX ADMIN — HYDROCHLOROTHIAZIDE 12.5 MG: 12.5 TABLET ORAL at 09:16

## 2024-09-22 RX ADMIN — LORATADINE 10 MG: 10 TABLET ORAL at 09:15

## 2024-09-22 RX ADMIN — PROPRANOLOL HYDROCHLORIDE 10 MG: 10 TABLET ORAL at 09:15

## 2024-09-22 RX ADMIN — METFORMIN HYDROCHLORIDE 500 MG: 500 TABLET, FILM COATED ORAL at 09:20

## 2024-09-22 RX ADMIN — CYANOCOBALAMIN TAB 1000 MCG 1000 MCG: 1000 TAB at 09:16

## 2024-09-22 RX ADMIN — CLOZAPINE 200 MG: 200 TABLET ORAL at 17:32

## 2024-09-22 RX ADMIN — CLOZAPINE 300 MG: 200 TABLET ORAL at 21:14

## 2024-09-22 RX ADMIN — NICOTINE POLACRILEX 2 MG: 2 GUM, CHEWING ORAL at 14:25

## 2024-09-22 RX ADMIN — NICOTINE POLACRILEX 2 MG: 2 GUM, CHEWING ORAL at 21:14

## 2024-09-22 RX ADMIN — GLYCOPYRROLATE 1 MG: 1 TABLET ORAL at 14:24

## 2024-09-22 RX ADMIN — FAMOTIDINE 20 MG: 20 TABLET ORAL at 17:40

## 2024-09-22 RX ADMIN — PANTOPRAZOLE SODIUM 20 MG: 20 TABLET, DELAYED RELEASE ORAL at 09:18

## 2024-09-22 RX ADMIN — GLYCOPYRROLATE 2 MG: 1 TABLET ORAL at 21:14

## 2024-09-22 NOTE — PLAN OF CARE
Problem: Alteration in Thoughts and Perception  Goal: Treatment Goal: Gain control of psychotic behaviors/thinking, reduce/eliminate presenting symptoms and demonstrate improved reality functioning upon discharge  Outcome: Progressing  Goal: Verbalize thoughts and feelings  Description: Interventions:  - Promote a nonjudgmental and trusting relationship with the patient through active listening and therapeutic communication  - Assess patient's level of functioning, behavior and potential for risk  - Engage patient in 1 on 1 interactions  - Encourage patient to express fears, feelings, frustrations, and discuss symptoms    - [place] patient to reality, help patient recognize reality-based thinking   - Administer medications as ordered and assess for potential side effects  - Provide the patient education related to the signs and symptoms of the illness and desired effects of prescribed medications  Outcome: Progressing  Goal: Refrain from acting on delusional thinking/internal stimuli  Description: Interventions:  - Monitor patient closely, per order   - Utilize least restrictive measures   - Set reasonable limits, give positive feedback for acceptable   - Administer medications as ordered and monitor of potential side effects  Outcome: Progressing  Goal: Agree to be compliant with medication regime, as prescribed and report medication side effects  Description: Interventions:  - Offer appropriate PRN medication and supervise ingestion; conduct AIMS, as needed   Outcome: Progressing  Goal: Recognize dysfunctional thoughts, communicate reality-based thoughts at the time of discharge  Description: Interventions:  - Provide medication and psycho-education to assist patient in compliance and developing insight into his/her illness   Outcome: Progressing  Goal: Complete daily ADLs, including personal hygiene independently, as able  Description: Interventions:  - Observe, teach, and assist patient with ADLS  - Monitor and  promote a balance of rest/activity, with adequate nutrition and elimination   Outcome: Progressing     Problem: Ineffective Coping  Goal: Identifies healthy coping skills  Outcome: Progressing  Goal: Demonstrates healthy coping skills  Outcome: Progressing  Goal: Participates in unit activities  Description: Interventions:  - Provide therapeutic environment   - Provide required programming   - Redirect inappropriate behaviors   Outcome: Progressing  Goal: Patient/Family participate in treatment and DC plans  Description: Interventions:  - Provide therapeutic environment  Outcome: Progressing  Goal: Patient/Family verbalizes awareness of resources  Outcome: Progressing     Problem: Depression  Goal: Treatment Goal: Demonstrate behavioral control of depressive symptoms, verbalize feelings of improved mood/affect, and adopt new coping skills prior to discharge  Outcome: Progressing  Goal: Verbalize thoughts and feelings  Description: Interventions:  - Assess and re-assess patient's level of risk   - Engage patient in 1:1 interactions, daily, for a minimum of 15 minutes   - Encourage patient to express feelings, fears, frustrations, hopes   Outcome: Progressing  Goal: Refrain from harming self  Description: Interventions:  - Monitor patient closely, per order   - Supervise medication ingestion, monitor effects and side effects   Outcome: Progressing  Goal: Refrain from isolation  Description: Interventions:  - Develop a trusting relationship   - Encourage socialization   Outcome: Progressing  Goal: Refrain from self-neglect  Outcome: Progressing  Goal: Attend and participate in unit activities, including therapeutic, recreational, and educational groups  Description: Interventions:  - Provide therapeutic and educational activities daily, encourage attendance and participation, and document same in the medical record   Outcome: Progressing  Goal: Complete daily ADLs, including personal hygiene independently, as  able  Description: Interventions:  - Observe, teach, and assist patient with ADLS  -  Monitor and promote a balance of rest/activity, with adequate nutrition and elimination   Outcome: Progressing     Problem: Anxiety  Goal: Anxiety is at manageable level  Description: Interventions:  - Assess and monitor patient's anxiety level.   - Monitor for signs and symptoms (heart palpitations, chest pain, shortness of breath, headaches, nausea, feeling jumpy, restlessness, irritable, apprehensive).   - Collaborate with interdisciplinary team and initiate plan and interventions as ordered.  - [place] patient to unit/surroundings  - Explain treatment plan  - Encourage participation in care  - Encourage verbalization of concerns/fears  - Identify coping mechanisms  - Assist in developing anxiety-reducing skills  - Administer/offer alternative therapies  - Limit or eliminate stimulants  Outcome: Progressing     Problem: Alteration in Orientation  Goal: Treatment Goal: Demonstrate a reduction of confusion and improved orientation to person, place, time and/or situation upon discharge, according to optimum baseline/ability  Outcome: Progressing  Goal: Interact with staff daily  Description: Interventions:  - Assess and re-assess patient's level of orientation  - Engage patient in 1 on 1 interactions, daily, for a minimum of 15 minutes   - Establish rapport/trust with patient   Outcome: Progressing  Goal: Express concerns related to confused thinking related to:  Description: Interventions:  - Encourage patient to express feelings, fears, frustrations, hopes  - Assign consistent caregivers   - [place]/re-orient patient as needed  - Allow comfort items, as appropriate  - Provide visual cues, signs, etc.   Outcome: Progressing  Goal: Allow medical examinations, as recommended  Description: Interventions:  - Provide physical/neurological exams and/or referrals, per provider   Outcome: Progressing  Goal: Cooperate with recommended  testing/procedures  Description: Interventions:  - Determine need for ancillary testing  - Observe for mental status changes  - Implement falls/precaution protocol   Outcome: Progressing  Goal: Attend and participate in unit activities, including therapeutic, recreational, and educational groups  Description: Interventions:  - Provide therapeutic and educational activities daily, encourage attendance and participation, and document same in the medical record   - Provide appropriate opportunities for reminiscence   - Provide a consistent daily routine   - Encourage family contact/visitation   Outcome: Progressing  Goal: Complete daily ADLs, including personal hygiene independently, as able  Description: Interventions:  - Observe, teach, and assist patient with ADLS  Outcome: Progressing     Problem: DISCHARGE PLANNING - CARE MANAGEMENT  Goal: Discharge to post-acute care or home with appropriate resources  Description: INTERVENTIONS:  - Conduct assessment to determine patient/family and health care team treatment goals, and need for post-acute services based on payer coverage, community resources, and patient preferences, and barriers to discharge  - Address psychosocial, clinical, and financial barriers to discharge as identified in assessment in conjunction with the patient/family and health care team  - Arrange appropriate level of post-acute services according to patient's   needs and preference and payer coverage in collaboration with the physician and health care team  - Communicate with and update the patient/family, physician, and health care team regarding progress on the discharge plan  - Arrange appropriate transportation to post-acute venues  Outcome: Progressing

## 2024-09-22 NOTE — PROGRESS NOTES
"    Psychiatric Progress Note - Department of Behavioral Health   Alberto Berumen 28 y.o. male MRN: [id number]  Unit/Bed#: [facility] 108-02 Encounter: [id number]    ASSESSMENT & PLAN     Principal Problem:    Schizoaffective disorder, bipolar type (HCC)  Active Problems:    GERD (gastroesophageal reflux disease)    Medical clearance for psychiatric admission    Tobacco abuse    T wave inversion in EKG    Chronic idiopathic constipation    Confluent and reticulate papillomatosis    Class 2 obesity in adult    Primary hypertension    Elevated hemoglobin A1c    Bilateral lower extremity edema      Continue to promote patient participation in therapeutic milieu.  Continue medical management per medicine.  Continue previously prescribed psychotropic medication regimen; see below.  Continue behavioral health checks q.7 minutes.   Continue vitals per behavioral health unit protocol.  Discharge date per primary team    SUBJECTIVE     Patient evaluated this a.m. for continuity of care. Patient was discussed at length with nursing and treatment team. Per nursing report, Alberto had his last ECT treatment on Friday. His heart rate was slightly elevated yesterday at 111. When rechecked it was down to 102.  He was observed sharing his dessert with Ana.  Last bowel movement and shower was the 21st. PRNs administered: 9/22 Nicorette gum at 0917.    No acute distress is noted throughout evaluation. Leroy presents walking in the hallway during walking group. He is brighter upon approach and participates more throughout assessment.  Alberto states he is \"stable\". He denies any anxiety today but does report depression that he rates a 5/10 on the severity scale with 10 being the most severe. Alberto Berumen denies suicidal/homicidal ideation; contracts for safety. He continues to report hearing voices that say negative things about him.  He denies they are ever commanding in nature. Alberto denies pain at this time.  Appetite and " "sleep remain adequate.  Alberto reports \"twitching that is off and on\", however this was not observed during the assessment.  Denies any other symptoms.    PSYCHIATRIC REVIEW OF SYSTEMS     Behavior over the last 24 hours:  unchanged  Sleep: normal  Appetite: normal  Medication side effects: No    REVIEW OF SYSTEMS   Review of systems: no complaints    OBJECTIVE     Vital Signs in Past 24 Hours:  Temp:  [97.6 °F (36.4 °C)-97.8 °F (36.6 °C)] 97.8 °F (36.6 °C)  HR:  [] 92  Resp:  [16-18] 18  BP: (112-130)/(71-81) 112/71    Intake/Output in Past 24 hours:  No intake/output data recorded.  No intake/output data recorded.    Laboratory Results:  I have personally reviewed all pertinent laboratory/tests results.    Behavioral Health Medications: all current active meds have been reviewed and continue current psychiatric medications.    Current Facility-Administered Medications   Medication Dose Route Frequency Provider Last Rate    acetaminophen  650 mg Oral Q4H PRN Jordan C Holter, DO      acetaminophen  650 mg Oral Q6H PRN HOLLI Lion      aluminum-magnesium hydroxide-simethicone  30 mL Oral Q4H PRN Jordan C Holter,       amLODIPine  5 mg Oral Daily HOLLI Lion      ARIPiprazole  15 mg Oral Daily Bora Rosario MD      Artificial Tears  1 drop Both Eyes Q3H PRN Jordan C Holter, DO      atropine  1 drop Sublingual HS Harjeet Torres,       haloperidol lactate  2.5 mg Intramuscular Q4H PRN Max 4/day HOLLI Lion      And    LORazepam  1 mg Intramuscular Q4H PRN Max 4/day HOLLI Lion      And    benztropine  0.5 mg Intramuscular Q4H PRN Max 4/day HOLLI Lion      benztropine  1 mg Intramuscular Q4H PRN Max 6/day Jordan C Holter, DO      haloperidol lactate  5 mg Intramuscular Q4H PRN Max 4/day HOLLI Lion      And    LORazepam  2 mg Intramuscular Q4H PRN Max 4/day HOLLI Lion      And    benztropine  1 mg Intramuscular Q4H PRN Max 4/day HOLLI Lion      " benztropine  1 mg Oral Q4H PRN Max 6/day HOLLI Lion      benztropine  1 mg Oral Q4H PRN Max 6/day Ion C Holter, DO      bisacodyl  10 mg Rectal Daily PRN HOLLI Lion      calcium carbonate  500 mg Oral BID PRN Eileen Jensen, HOLLI      cloZAPine  200 mg Oral Before Dinner Bora Rosario MD      cloZAPine  300 mg Oral HS Bora Rosario MD      cyanocobalamin  1,000 mcg Oral Daily Pia Mantilla, STEVENP      hydrOXYzine HCL  50 mg Oral Q6H PRN Max 4/day Ion C Holter, DO      Or    diphenhydrAMINE  50 mg Intramuscular Q6H PRN Ion FISCHER Holter, DO      hydrOXYzine HCL  50 mg Oral Q6H PRN Max 4/day HOLLI Lion      Or    diphenhydrAMINE  50 mg Intramuscular Q6H PRN HOLLI Lion      diphenhydrAMINE-zinc acetate   Topical BID PRN HOLLI Lion      escitalopram  20 mg Oral Daily HOLLI Lion      famotidine  20 mg Oral BID HOLLI Monge      fluticasone  1 spray Each Nare Daily Brina Guillen MD      glycopyrrolate  1 mg Oral BID (AM & Afternoon) Bora Rosario MD      glycopyrrolate  2 mg Oral HS Bora Rosario MD      haloperidol  1 mg Oral Q6H PRN HOLLI Lion      haloperidol  2.5 mg Oral Q4H PRN Max 4/day HOLLI Lion      haloperidol  5 mg Oral Q4H PRN Max 4/day HOLLI Lion      hydroCHLOROthiazide  12.5 mg Oral Daily Pia Mantilla, HOLLI      hydrocortisone   Topical 4x Daily PRN HOLLI Lion      hydrOXYzine HCL  100 mg Oral Q6H PRN Max 4/day Ion FISCHER Holter, DO      Or    LORazepam  2 mg Intramuscular Q6H PRN Ion FISCHER Holter, DO      hydrOXYzine HCL  100 mg Oral Q6H PRN Max 4/day HOLLI Lion      Or    LORazepam  2 mg Intramuscular Q6H PRN HOLLI Lion      hydrOXYzine HCL  25 mg Oral Q6H PRN Max 4/day Ion FISCHER Holter, DO      ibuprofen  600 mg Oral Q8H PRN HOLLI Lion      loratadine  10 mg Oral Daily Brina Guillen MD      melatonin  3 mg Oral HS PRN Bora Rosario MD      metFORMIN  500 mg Oral BID  With Meals HOLLI Galvan      methocarbamol  500 mg Oral Q6H PRN HOLLI Lion      nicotine polacrilex  2 mg Oral Q4H PRN Bora Rosario MD      OLANZapine  5 mg Oral Q4H PRN Max 3/day [facility] Holter, DO      Or    OLANZapine  2.5 mg Intramuscular Q4H PRN Max 3/day [facility] Holter, DO      OLANZapine  5 mg Oral Q3H PRN Max 3/day [facility] Holter, DO      Or    OLANZapine  5 mg Intramuscular Q3H PRN Max 3/day [facility] Holter, DO      OLANZapine  2.5 mg Oral Q4H PRN Max 6/day [facility] Holter, DO      ondansetron  4 mg Oral Q6H PRN HOLLI Lion      pantoprazole  20 mg Oral QAM [facility] Holter, DO      polyethylene glycol  17 g Oral Daily PRN HOLLI Ghotra      propranolol  10 mg Oral Q12H KAYLIE HOLLI Lion      senna-docusate sodium  1 tablet Oral Daily PRN [facility] Cresencioter, DO      senna-docusate sodium  2 tablet Oral HS Melvina Ambron, DO      traZODone  50 mg Oral HS PRN HOLLI Lion      white petrolatum-mineral oil   Topical TID PRN HOLLI Lion         Risks, benefits and possible side effects of Medications:   Risks, benefits, and possible side effects of medications explained to patient and patient verbalizes understanding.        Mental Status Evaluation:  Appearance:  age appropriate and wearing hospital gown and a sweatshirt   Behavior:  Calm and cooperative   Speech:  normal pitch and normal volume   Mood:  depressed   Affect:  flat and mood-congruent   Language sparse   Thought Process:  Organized   Thought Content:  paranoid ideation, stating that the voices he hears are typically negative statements directed towards Alberto.. He denied any suicidal or homicidal thoughts, plans, or intents. No obsessions, compulsions, or distorted perceptions verbalized. He denies any other experiences that would be categorized as grandiose, somatic, or bizarre delusions.    Perceptual Disturbances: Auditory hallucinations without commands   Risk Potential: Suicidal Ideations  none, Homicidal Ideations none, and Potential for Aggression No   Sensorium:  person, place, and time/date   Cognition:  recent and remote memory grossly intact   Consciousness:  alert and awake    Attention: attention span appeared shorter than expected for age   Insight:  limited   Judgment: limited   Intellect Not assessed   Gait/Station: normal gait/station and normal balance   Motor Activity: no abnormal movements     Memory: Short and long term memory fair     Progress Toward Goals: Progressing, as evidenced by their participation (or lack thereof) in individual, social and therapeutic milieu in addition to adherence to their medication regimen.  Patient Acceptance: Progressing  Patient Understanding: Progressing  Patient Involvement in Reintegration Process: Progressing  Patient's Therapeutic Relationship with Psychiatrist: Somewhat guarded but more participation with assessment  Patient's Optimism Regarding Recovery: Progressing    Recommended Treatment:   See above for assessment and plan.    Inpatient Psychiatric Certification: Based upon physical, mental and social evaluations, I certify that inpatient psychiatric services are medically necessary for this patient for a duration of 2 midnights for the treatment of Schizoaffective disorder, bipolar type (HCC)    Counseling/Coordination of Care    Total unit time spent today ws greater than 15 minutes. Greater than 50% of total time was spent with the patient and/or patient's relatives and/or coordination of patient's care.     Patient's rights, confidentiality, exceptions to confidentiality, use of electronic medical record including appropriate staff access to medical record regarding behavioral health services and consent to treatment were reviewed.    Note Share:     This note was not shared with the patient due to reasonable likelihood of causing patient harm     This note has been constructed using a voice recognition system.    There may be  translation, syntax, or grammatical errors. If you have any questions, please contact the dictating provider.    HOLLI Stoddard

## 2024-09-22 NOTE — NURSING NOTE
Patient has been out of his room most of the shift. Social with select peers. Affect flat. Appetite good. Smiles upon approach. Denies suicidal ideations. Continues to have auditory hallucinations telling him they are going to harm his family. Medication compliant.

## 2024-09-23 PROCEDURE — 99232 SBSQ HOSP IP/OBS MODERATE 35: CPT | Performed by: PSYCHIATRY & NEUROLOGY

## 2024-09-23 RX ADMIN — PANTOPRAZOLE SODIUM 20 MG: 20 TABLET, DELAYED RELEASE ORAL at 08:54

## 2024-09-23 RX ADMIN — FLUTICASONE PROPIONATE 1 SPRAY: 50 SPRAY, METERED NASAL at 08:54

## 2024-09-23 RX ADMIN — ESCITALOPRAM OXALATE 20 MG: 10 TABLET, FILM COATED ORAL at 08:54

## 2024-09-23 RX ADMIN — ARIPIPRAZOLE 15 MG: 15 TABLET ORAL at 08:54

## 2024-09-23 RX ADMIN — NICOTINE POLACRILEX 2 MG: 2 GUM, CHEWING ORAL at 13:05

## 2024-09-23 RX ADMIN — AMLODIPINE BESYLATE 5 MG: 5 TABLET ORAL at 08:54

## 2024-09-23 RX ADMIN — CYANOCOBALAMIN TAB 1000 MCG 1000 MCG: 1000 TAB at 08:54

## 2024-09-23 RX ADMIN — HYDROCHLOROTHIAZIDE 12.5 MG: 12.5 TABLET ORAL at 08:54

## 2024-09-23 RX ADMIN — LORATADINE 10 MG: 10 TABLET ORAL at 08:54

## 2024-09-23 RX ADMIN — GLYCOPYRROLATE 1 MG: 1 TABLET ORAL at 13:05

## 2024-09-23 RX ADMIN — GLYCOPYRROLATE 1 MG: 1 TABLET ORAL at 08:54

## 2024-09-23 RX ADMIN — FAMOTIDINE 20 MG: 20 TABLET ORAL at 17:07

## 2024-09-23 RX ADMIN — CLOZAPINE 300 MG: 200 TABLET ORAL at 21:31

## 2024-09-23 RX ADMIN — PROPRANOLOL HYDROCHLORIDE 10 MG: 10 TABLET ORAL at 21:32

## 2024-09-23 RX ADMIN — NICOTINE POLACRILEX 2 MG: 2 GUM, CHEWING ORAL at 17:07

## 2024-09-23 RX ADMIN — METFORMIN HYDROCHLORIDE 500 MG: 500 TABLET, FILM COATED ORAL at 17:07

## 2024-09-23 RX ADMIN — NICOTINE POLACRILEX 2 MG: 2 GUM, CHEWING ORAL at 08:54

## 2024-09-23 RX ADMIN — METFORMIN HYDROCHLORIDE 500 MG: 500 TABLET, FILM COATED ORAL at 08:54

## 2024-09-23 RX ADMIN — FAMOTIDINE 20 MG: 20 TABLET ORAL at 08:54

## 2024-09-23 RX ADMIN — ATROPINE SULFATE 1 DROP: 10 SOLUTION/ DROPS OPHTHALMIC at 21:32

## 2024-09-23 RX ADMIN — CLOZAPINE 200 MG: 200 TABLET ORAL at 17:07

## 2024-09-23 RX ADMIN — SENNOSIDES AND DOCUSATE SODIUM 2 TABLET: 8.6; 5 TABLET ORAL at 21:31

## 2024-09-23 RX ADMIN — GLYCOPYRROLATE 2 MG: 1 TABLET ORAL at 21:32

## 2024-09-23 RX ADMIN — PROPRANOLOL HYDROCHLORIDE 10 MG: 10 TABLET ORAL at 08:53

## 2024-09-23 NOTE — NURSING NOTE
Alberto maintained on ongoing SAFE precaution without incident on this shift. Awake, alert , visible, withdrawn, and cooperative upon approach. Attended and participated in 5 out of 7 groups today. Continues to be compliant with medication regimen and had snack. No inappropriate contact between him and a female peer noted. Denies all psych symptoms

## 2024-09-23 NOTE — ASSESSMENT & PLAN NOTE
Continue famotidine 20 mg tablet BID per medical team  Continue glycopyrrolate 1 mg tablet BID AM and afternoon per medical team  Continue glycopyrrolate 2 mg tablet before bed per medical team  Continue pantoprazole EC 20 mg tablet every morning per medical team

## 2024-09-23 NOTE — ASSESSMENT & PLAN NOTE
Schizoaffective Disorder, Bipolar Type  Ongoing depressive symptoms with c/o AH of threatening voices..   Continues to have paranoid delusions.  Status of diagnosis: slowly improving  Continue CRP, BNP, CBC with diff every 2 weeks, and weekly clozapine and CK in setting of Clozaril use (9/19/2024 CBC ANC 5.72). Clozaril level 9/26/24: 944 (pt asymptomatic)    Continue aripiprazole 15 mg tablet daily  Continue clozapine 200 mg tablet daily before dinner  Continue clozapine 300 gm tablet daily at bedtime  Continue cyanocobalamin 1000 mcg tablet daily  Continue escitalopram 20 mg tablet daily  Continue senna-docusate sodium two 50 mg tablets daily before bed

## 2024-09-23 NOTE — NURSING NOTE
Alberto reported feeling depressed today, but denied feeling anxious.  Reported that he hears voices today.  They are still telling him that they are going to kill him and his family.      Pt is visible, social, pleasant and cooperative.  Meal and med compliant, consumed 100% of dinner.  Requested gum throughout the shift.  No unmet needs.  No behavioral issues noted or reported.

## 2024-09-23 NOTE — SOCIAL WORK
SW informed by [facility] pt's Medicaid coverage is ending 9/30. Pt receives SSDI and is likely making too much per month and/or has too much in assets/resources now to qualify for MA. Pt has active Medicare.

## 2024-09-23 NOTE — PROGRESS NOTES
09/23/24 1000 09/23/24 1100 09/23/24 1300   Activity/Group Checklist   Group Other (Comment)  (self care) Self Esteem  (Positive strengths and qualities) Personal control   Attendance Attended Attended Attended   Attendance Duration (min) 46-60 46-60 46-60   Interactions Interacted appropriately Interacted appropriately Interacted appropriately   Affect/Mood Appropriate Appropriate Appropriate   Goals Achieved Able to engage in interactions;Able to listen to others;Able to self-disclose;Able to recieve feedback;Able to give feedback to another;Identified feelings Able to engage in interactions;Able to listen to others;Able to self-disclose;Able to give feedback to another;Able to recieve feedback;Identified feelings Able to engage in interactions;Able to listen to others;Able to self-disclose;Able to recieve feedback;Able to give feedback to another      09/23/24 1400 09/23/24 1600   Activity/Group Checklist   Group Other (Comment) Community meeting   Attendance Attended Attended   Attendance Duration (min) 31-45 31-45   Interactions Interacted appropriately Interacted appropriately   Affect/Mood Appropriate Appropriate   Goals Achieved Able to engage in interactions;Able to listen to others;Able to self-disclose;Able to recieve feedback;Able to give feedback to another Able to listen to others;Able to engage in interactions;Able to self-disclose;Able to recieve feedback;Able to give feedback to another;Identified feelings

## 2024-09-23 NOTE — NURSING NOTE
Alberto is calm with a blunted affect, depressed mood, and AH telling him they are going to kill him and his family. Visible on the milieu throughout the morning attending groups. Socializes with peers. No unmet needs currently.

## 2024-09-23 NOTE — ASSESSMENT & PLAN NOTE
Continue amlodipine 5 mg tablet daily per medical team   Continue hydrochlorothiazide 12.5 mg tablet daily per medical team   immune

## 2024-09-23 NOTE — PROGRESS NOTES
"Psychiatry Progress Note [facility]    Alberto Berumen 28 y.o. male MRN: [id number]  Unit/Bed#: [facility] 108-02 Encounter: [id number]  Code Status: Level 1 - Full Code    PCP: Joce Juan MD    Date of Admission:  3/29/2024 2008   Date of Service:  09/23/24    Patient Active Problem List   Diagnosis    GERD (gastroesophageal reflux disease)    Medical clearance for psychiatric admission    Schizoaffective disorder, bipolar type ([facility])    Tobacco abuse    T wave inversion in EKG    Syringoma    Chronic idiopathic constipation    Vitamin B 12 deficiency    Vitamin D deficiency    Confluent and reticulate papillomatosis    Class 2 obesity in adult    Primary hypertension    Elevated hemoglobin A1c    Bilateral lower extremity edema         Review of systems: No symptoms reported   Psychiatric Diagnosis: Schizoaffective Disorder, Bipolar Type     Assessment  Overall Status: Patient reported feeling well rested today and reported sleeping over 8 hours last night. He reported drooling a little bit last night, but that it was better than it was before he received his drops. He reported feeling \"stable\" and rated his depression symptoms at a 4/10 today with his anxiety at a 5/10. He continues to hear the threatening auditory hallucinations but reported that they have been quieter recently.    Certification Statement: The patient will continue to require additional inpatient hospital stay due to continued psychotic and severe symptoms of depression       Assessment & Plan  Schizoaffective disorder, bipolar type ([facility])  Schizoaffective Disorder, Bipolar Type  Continues to have depression symptoms and frequent, threatening hallucinations.   Continues to have paranoid delusions with a blunted affect  Appears to be making small improvements with his symptoms    Continue aripiprazole 15 mg tablet daily  Continue clozapine 200 mg tablet daily before dinner  Continue clozapine 300 gm tablet daily before " bed  Continue cyanocobalamin 1000 mcg tablet daily  Continue escitalopram 20 mg tablet daily  Continue senna-docusate sodium two 50 mg tablets daily before bed  Tobacco abuse    GERD (gastroesophageal reflux disease)  Continue famotidine 20 mg tablet BID per medical team  Continue glycopyrrolate 1 mg tablet BID AM and afternoon per medical team  Continue glycopyrrolate 2 mg tablet before bed per medical team  Continue pantoprazole EC 20 mg tablet every morning per medical team  Primary hypertension  Continue amlodipine 5 mg tablet daily per medical team   Continue hydrochlorothiazide 12.5 mg tablet daily per medical team  Elevated hemoglobin A1c  Continue metformin 500 mg tablet BID with meals per medical team  Chronic idiopathic constipation  Continue senna-docusate sodium two 50 mg tablets before bed per medical team  Class 2 obesity in adult           Medications:   Abilify 15 mg po daily, clozaril 200 mg po at dinner and 300 mg po HS, B12 1,000 mg po daily, lexapro 20 mg po daily, glycopyrrolate 1 mg BID and 2 mg HS, propranolol 10 mg po BID, senna docusate sodium 2 tablets po HS  Side effects from treatment: Reported continued drooling that has improved since beginning to receive atropine drops before bed.    Medication changes   None  Medication education  Risks side effects benefits and precautions of medications discussed with patient and he did verbalize an understanding about risks for metabolic syndrome from being on neuroleptics and is form tardive dyskinesia etc.  All medications reviewed and I recommend that they be continued for symptom management  Understanding of medications: Verbalized   Justification for dual anti-psychotics: Inability to control psychotic symptoms      Non-pharmacological treatments  Continue with individual, group, milieu and occupational therapy using recovery principles and psycho-education about accepting illness and the need for treatment.    Safety  Safety and communication  "plan established to target dynamic risk factors discussed above.    Discharge Plan   Group home vs Aunt's house    Interval Progress   Patient reported sleeping well last night and sleeping for more than 8 hours. He reported feeling rested but did report some drooling over night that he stated was better than it was prior to receiving the atropine drops, but that it continues. He reported having a good appetite and feeling \"stable\" today. He reported that his depression symptoms were about a 4/10 today and that the only he could think of that was contributing to this was the voices he hears. He reported that his threatening auditory hallucinations continue but they have been quieter recently but other than that remain unchanged. He continues to report that he feels the voices are out to get him and that they are messing with his head, but reported feeling safe in the hospital. He reported that approximately every other day, he feels 10/10 anxiety but could not mention anything that contributed to him feeling this way. At the time of the interview he reported that his anxiety symptoms were a 5/10. He denied any suicidal or homicidal thoughts, plans, or intents. He stated that he talked with his aunt about 2 days ago and mentioned again that she would be okay with him living with her, and thinks he wants to go there, but is not positive yet. He reported he was still looking into his options but confirmed he has all the resources he feels he needs to make a decision about where he wants to go.   Acceptance by patient: Yes   Hopefulness in recovery: Hoping to live with aunt   Involved in reintegration process: Aunt   Trusting in relationship with psychiatrist: Yes  Sleep: Good, 8+ hours  Appetite: Good, 75/100/100 for lunch yesterday, dinner yesterday, and breakfast this morning  Compliance with Medications: Good  Group attendance: 4/9  Significant events: None reported.      Mental Status Exam  Appearance: age " "appropriate, overweight, wearing personal sweatshirt with restrepo up and hospital scrub pants. Good eye contact   Behavior: cooperative, calm  Speech: normal rate, normal pitch, scant, soft  Mood: depressed  Affect:  depressed, flat, mood congruent  Thought Process: organized, linear  Thought Content: paranoid ideation, stating that the voices he hears are out to get him although he confirmed he feels safe in the hospital. He noted that his auditory hallucinations have been improving and seem to be softer than they have been in the past. He denied any suicidal or homicidal thoughts, plans, or intent. No obsession, compulsions, or distorted perceptions verbalized. He denied any other experiences that would be categorized as grandiose, somatic, or bizarre delusions.   Perceptual Disturbances: auditory hallucinations that he reports tell him that they are going to kill him and his family when he leaves the hospital. He reports that they have been getting quieter but he still hears them every day and they \"come and go\". He continues to endorse that occasionally they sound like his ex girlfriend and other times they sound like strangers to him. , denies visual hallucinations when asked, does not appear responding to internal stimuli  Hx Risk Factors: chronic psychiatric problems, chronic depressive symptoms, chronic psychotic symptoms  Sensorium: alert and oriented to person, place, time and situation  Cognition: recent and remote memory grossly intact  Consciousness: alert and awake  Attention: attention span and concentration are age appropriate  Intellect: appears to be of average intelligence  Insight: limited  Judgement: impaired  Motor Activity: no abnormal movements     Vitals  Temp:  [97.6 °F (36.4 °C)-97.8 °F (36.6 °C)] 97.6 °F (36.4 °C)  HR:  [] 86  Resp:  [16-18] 16  BP: (129-167)/(79-88) 167/79  SpO2:  [99 %-100 %] 99 %  No intake or output data in the 24 hours ending 09/23/24 0947    Lab Results: All Labs " For Current Hospital Admission Reviewed    Current Facility-Administered Medications   Medication Dose Route Frequency Provider Last Rate    acetaminophen  650 mg Oral Q4H PRN Jordan C Holter, DO      acetaminophen  650 mg Oral Q6H PRN HOLLI Lion      aluminum-magnesium hydroxide-simethicone  30 mL Oral Q4H PRN Jordan C Holter,       amLODIPine  5 mg Oral Daily HOLLI Lion      ARIPiprazole  15 mg Oral Daily Bora Rosario MD      Artificial Tears  1 drop Both Eyes Q3H PRN Jordan C Holter,       atropine  1 drop Sublingual HS Harjeet TorresDO      haloperidol lactate  2.5 mg Intramuscular Q4H PRN Max 4/day HOLLI Lion      And    LORazepam  1 mg Intramuscular Q4H PRN Max 4/day HOLLI Lion      And    benztropine  0.5 mg Intramuscular Q4H PRN Max 4/day HOLLI Lion      benztropine  1 mg Intramuscular Q4H PRN Max 6/day Jordan C Holter, DO      haloperidol lactate  5 mg Intramuscular Q4H PRN Max 4/day HOLLI Lion      And    LORazepam  2 mg Intramuscular Q4H PRN Max 4/day HOLLI Lion      And    benztropine  1 mg Intramuscular Q4H PRN Max 4/day HOLLI Lion      benztropine  1 mg Oral Q4H PRN Max 6/day HOLLI Lion      benztropine  1 mg Oral Q4H PRN Max 6/day Jordan C Holter, DO      bisacodyl  10 mg Rectal Daily PRN HOLLI Lion      calcium carbonate  500 mg Oral BID PRN HOLLI Monge      cloZAPine  200 mg Oral Before Dinner Bora Rosario MD      cloZAPine  300 mg Oral HS Bora Rosario MD      cyanocobalamin  1,000 mcg Oral Daily HOLLI Galvan      hydrOXYzine HCL  50 mg Oral Q6H PRN Max 4/day Jordan C Holter, DO      Or    diphenhydrAMINE  50 mg Intramuscular Q6H PRN Jordan C Holter,       hydrOXYzine HCL  50 mg Oral Q6H PRN Max 4/day HOLLI Lion      Or    diphenhydrAMINE  50 mg Intramuscular Q6H PRN HOLLI Lion      diphenhydrAMINE-zinc acetate   Topical BID PRN HOLLI Lion       escitalopram  20 mg Oral Daily HOLLI Lion      famotidine  20 mg Oral BID Eileen GonzalezmiryamjaylenHOLLI      fluticasone  1 spray Each Nare Daily Brina Guillen MD      glycopyrrolate  1 mg Oral BID (AM & Afternoon) Bora Rosario MD      glycopyrrolate  2 mg Oral HS Bora Rosario MD      haloperidol  1 mg Oral Q6H PRN HOLLI Lion      haloperidol  2.5 mg Oral Q4H PRN Max 4/day HOLLI Lion      haloperidol  5 mg Oral Q4H PRN Max 4/day HOLLI Lion      hydroCHLOROthiazide  12.5 mg Oral Daily HOLLI Galvan      hydrocortisone   Topical 4x Daily PRN HOLLI Lion      hydrOXYzine HCL  100 mg Oral Q6H PRN Max 4/day [facility] Holter, DO      Or    LORazepam  2 mg Intramuscular Q6H PRN [facility] Holter, DO      hydrOXYzine HCL  100 mg Oral Q6H PRN Max 4/day HOLLI Lion      Or    LORazepam  2 mg Intramuscular Q6H PRN HOLLI Lion      hydrOXYzine HCL  25 mg Oral Q6H PRN Max 4/day [facility] Holter, DO      ibuprofen  600 mg Oral Q8H PRN HOLLI Lion      loratadine  10 mg Oral Daily Brina Guillen MD      melatonin  3 mg Oral HS PRN Bora Rosario MD      metFORMIN  500 mg Oral BID With Meals HOLLI Galvan      methocarbamol  500 mg Oral Q6H PRN HOLLI Lion      nicotine polacrilex  2 mg Oral Q4H PRN Bora Rosario MD      OLANZapine  5 mg Oral Q4H PRN Max 3/day [facility] Holter, DO      Or    OLANZapine  2.5 mg Intramuscular Q4H PRN Max 3/day [facility] Holter, DO      OLANZapine  5 mg Oral Q3H PRN Max 3/day [facility] Holter, DO      Or    OLANZapine  5 mg Intramuscular Q3H PRN Max 3/day [facility] Holter, DO      OLANZapine  2.5 mg Oral Q4H PRN Max 6/day [facility] Holter, DO      ondansetron  4 mg Oral Q6H PRN HOLLI Lion      pantoprazole  [facility] Hol[facility] DO      polyethylene glycol  17 g Oral Daily PRN HOLLI Ghotra      propranolol  10 mg Oral Q12H [facility] HOLLI Lion      senna-docusate sodium  1 tablet Oral Daily PRN  Jordan C Holter, DO      senna-docusate sodium  2 tablet Oral HS Melvina De Jesus DO      traZODone  50 mg Oral HS PRN HOLLI Lion      white petrolatum-mineral oil   Topical TID PRN HOLLI Lion         Counseling / Coordination of Care: Total floor / unit time spent today 15 minutes. Greater than 50% of total time was spent with the patient and / or family counseling and / or somewhat receptive to supportive listening and teaching positive coping skills to deal with symptom mangement.     Patient's Rights, confidentiality and exceptions to confidentiality, use of automated medical record, [facility] staff access to medical record, and consent to treatment reviewed.    This note has been dictated and hence there may be problems with punctuation, spelling and formatting and if anyone has any concerns please address them to the writer.  This note is not shared with patient due to potential for making patient's condition worse by knowing the content of the note.    Eveline WALLACE

## 2024-09-23 NOTE — SOCIAL WORK
SW placed call to pt's sister Ginger at [phone]. [employer] is pt's rep payee. Left  requesting return call.

## 2024-09-23 NOTE — PROGRESS NOTES
09/23/24 0759   Team Meeting   Meeting Type Daily Rounds   Team Members Present   Team Members Present Physician;Nurse;;Other (Discipline and Name)   Patient/Family Present   Patient Present No   Patient's Family Present No     In attendance:  MD Guy Gamino, RN  Luisana Alvarado, [facility]  Carla Lux, CHRISW    Groups: 4/9    Pt reportedly sharing food with peer. Pt AH the same. Pt visible, pleasant. No major bx issues. CRR waitlist.

## 2024-09-24 PROCEDURE — 99232 SBSQ HOSP IP/OBS MODERATE 35: CPT | Performed by: PSYCHIATRY & NEUROLOGY

## 2024-09-24 RX ADMIN — LORATADINE 10 MG: 10 TABLET ORAL at 08:46

## 2024-09-24 RX ADMIN — NICOTINE POLACRILEX 2 MG: 2 GUM, CHEWING ORAL at 17:13

## 2024-09-24 RX ADMIN — CYANOCOBALAMIN TAB 1000 MCG 1000 MCG: 1000 TAB at 08:45

## 2024-09-24 RX ADMIN — METFORMIN HYDROCHLORIDE 500 MG: 500 TABLET, FILM COATED ORAL at 17:13

## 2024-09-24 RX ADMIN — GLYCOPYRROLATE 1 MG: 1 TABLET ORAL at 08:45

## 2024-09-24 RX ADMIN — FAMOTIDINE 20 MG: 20 TABLET ORAL at 08:45

## 2024-09-24 RX ADMIN — SENNOSIDES AND DOCUSATE SODIUM 2 TABLET: 8.6; 5 TABLET ORAL at 21:15

## 2024-09-24 RX ADMIN — ARIPIPRAZOLE 15 MG: 15 TABLET ORAL at 08:46

## 2024-09-24 RX ADMIN — FAMOTIDINE 20 MG: 20 TABLET ORAL at 17:13

## 2024-09-24 RX ADMIN — METFORMIN HYDROCHLORIDE 500 MG: 500 TABLET, FILM COATED ORAL at 08:45

## 2024-09-24 RX ADMIN — NICOTINE POLACRILEX 2 MG: 2 GUM, CHEWING ORAL at 21:16

## 2024-09-24 RX ADMIN — CLOZAPINE 300 MG: 200 TABLET ORAL at 21:16

## 2024-09-24 RX ADMIN — GLYCOPYRROLATE 1 MG: 1 TABLET ORAL at 13:01

## 2024-09-24 RX ADMIN — GLYCOPYRROLATE 2 MG: 1 TABLET ORAL at 21:16

## 2024-09-24 RX ADMIN — AMLODIPINE BESYLATE 5 MG: 5 TABLET ORAL at 08:46

## 2024-09-24 RX ADMIN — PROPRANOLOL HYDROCHLORIDE 10 MG: 10 TABLET ORAL at 08:45

## 2024-09-24 RX ADMIN — PROPRANOLOL HYDROCHLORIDE 10 MG: 10 TABLET ORAL at 21:16

## 2024-09-24 RX ADMIN — HYDROCHLOROTHIAZIDE 12.5 MG: 12.5 TABLET ORAL at 08:46

## 2024-09-24 RX ADMIN — ESCITALOPRAM OXALATE 20 MG: 10 TABLET, FILM COATED ORAL at 08:45

## 2024-09-24 RX ADMIN — NICOTINE POLACRILEX 2 MG: 2 GUM, CHEWING ORAL at 08:45

## 2024-09-24 RX ADMIN — PANTOPRAZOLE SODIUM 20 MG: 20 TABLET, DELAYED RELEASE ORAL at 08:45

## 2024-09-24 RX ADMIN — NICOTINE POLACRILEX 2 MG: 2 GUM, CHEWING ORAL at 13:01

## 2024-09-24 RX ADMIN — CLOZAPINE 200 MG: 200 TABLET ORAL at 17:13

## 2024-09-24 RX ADMIN — FLUTICASONE PROPIONATE 1 SPRAY: 50 SPRAY, METERED NASAL at 08:46

## 2024-09-24 NOTE — PROGRESS NOTES
"Psychiatry Progress Note [facility]    Alberto Berumen 28 y.o. male MRN: [id number]  Unit/Bed#: [facility] 108-02 Encounter: [id number]  Code Status: Level 1 - Full Code    PCP: Joce Juan MD    Date of Admission:  3/29/2024 2008   Date of Service:  09/24/24    Patient Active Problem List   Diagnosis    GERD (gastroesophageal reflux disease)    Medical clearance for psychiatric admission    Schizoaffective disorder, bipolar type (HCC)    Tobacco abuse    T wave inversion in EKG    Syringoma    Chronic idiopathic constipation    Vitamin B 12 deficiency    Vitamin D deficiency    Confluent and reticulate papillomatosis    Class 2 obesity in adult    Primary hypertension    Elevated hemoglobin A1c    Bilateral lower extremity edema         Review of systems: No symptoms reported  Psychiatric Diagnosis: Schizoaffective Disorder, Bipolar Type     Assessment  Overall Status: Patient reported feeling well rested despite being in bed during the interview. He stated he went to bed early last night but also woke up earlier than usual. He reported feeling \"stable\" and rated his depression symptoms a 5/10 today but denied any anxiety. He continues to hear the threatening auditory hallucinations but confirmed that they still remain quieter than in the past.   Certification Statement: The patient will continue to require additional inpatient hospital stay due to continued psychotic and severe symptoms of depression.      Assessment & Plan  Schizoaffective disorder, bipolar type (HCC)  Schizoaffective Disorder, Bipolar Type  Continues to have depression symptoms and frequent, threatening hallucinations.   Continues to have paranoid delusions with a blunted affect  Appears to be making small improvements with his symptoms    Continue aripiprazole 15 mg tablet daily  Continue clozapine 200 mg tablet daily before dinner  Continue clozapine 300 gm tablet daily before bed  Continue cyanocobalamin 1000 mcg tablet " daily  Continue escitalopram 20 mg tablet daily  Continue senna-docusate sodium two 50 mg tablets daily before bed  Tobacco abuse    GERD (gastroesophageal reflux disease)  Continue famotidine 20 mg tablet BID per medical team  Continue glycopyrrolate 1 mg tablet BID AM and afternoon per medical team  Continue glycopyrrolate 2 mg tablet before bed per medical team  Continue pantoprazole EC 20 mg tablet every morning per medical team  Primary hypertension  Continue amlodipine 5 mg tablet daily per medical team   Continue hydrochlorothiazide 12.5 mg tablet daily per medical team  Elevated hemoglobin A1c  Continue metformin 500 mg tablet BID with meals per medical team  Chronic idiopathic constipation  Continue senna-docusate sodium two 50 mg tablets before bed per medical team  Class 2 obesity in adult      Medications:   Abilify 15 mg po daily, clozaril 200 mg po at dinner and 300 mg po HS, B12 1000 mg po daily, lexapro 20 mg po daily, glycopyrrolate 1 mg BID and 2 mg HS, propranolol 10 mg po BID, senna docusate sodium 2 tablets po HS  Side effects from treatment: Reported continued drooling that has improved since beginning to receive atropine drops.   Medication changes   None  Medication education  Risks side effects benefits and precautions of medications discussed with patient and he did verbalize an understanding about risks for metabolic syndrome from being on neuroleptics and is form tardive dyskinesia etc.  All medications reviewed and I recommend that they be continued for symptom management  Understanding of medications: Verbalized   Justification for dual anti-psychotics: Inability to control psychotic symptoms      Non-pharmacological treatments  Continue with individual, group, milieu and occupational therapy using recovery principles and psycho-education about accepting illness and the need for treatment.    Safety  Safety and communication plan established to target dynamic risk factors discussed  "above.    Discharge Plan   Aunt's house vs group home    Interval Progress   Patient reported going to bed early last night and sleeping well. He reported feeling rested today and endorsed continued drooling but confirms that it has improved since receiving the atropine drops. He reported having a good appetite and feeling \"stable\" today. He reported that his depression symptoms are about a 5/10 but denied any anxiety. He denied any event occurring that made his depression symptoms worse, he just attributed them to the voices he hears. He continues to hear the frequent threatening hallucinations and denies any changes to them regarding who the voices are or how frequently he hears them. He does endorse that they continue to be quieter than they have been in the past. He continues to report that he feels like the voices are out to get him but confirms that he feels safe in the hospital. He denied any suicidal or homicidal thoughts, plans, or intents. He mentioned that he would prefer to live with his aunt after discharge because he believes if he goes to a group home he will be in the hospital for a longer time. He is aware that his aunt needs to confirm her willingness to let him live with her with the treatment team.  Acceptance by patient: Yes   Hopefulness in recovery: Hoping to live with aunt   Involved in reintegration process: Aunt   Trusting in relationship with psychiatrist: Yes  Sleep: Good, 8+ hours  Appetite: Good, 100%/25%/100% yesterday  Compliance with Medications: Good  Group attendance: 8/10  Significant events: None reported      Mental Status Exam  Appearance: age appropriate, overweight, wearing personal sweatshirt with restrepo up and hospital scrub pants. Good eye contact.  Behavior: cooperative, calm  Speech: normal rate, normal pitch, scant, soft  Mood: depressed  Affect:  depressed, flat, mood congruent  Thought Process: organized, linear  Thought Content: paranoid ideation, stating that he feels " "the voices that he hears are out to get him but he confirms that he feels safe in the hospital. He noted that his auditory hallucinations continue to \"come and go\" and are threatening him. He does note that the voices appear to be quieter in recent days. He denied any suicidal or homicidal thoughts, plans, or intents. No obsessions, compulsions, or distorted body perceptions verbalized. He denied any other experiences that would be categorized as grandiose, somatic, or bizarre delusions.   Perceptual Disturbances: auditory hallucinations that tell him they are going to shoot him and his family when he gets out of the hospital. He confirms that they continue to either sound like his ex girlfriend or he does not recognize the voice. He continues to report that they \"come and go\" but that he hears them everyday but states they have sounded quieter recently., denies visual hallucinations when asked, does not appear responding to internal stimuli  Hx Risk Factors: chronic psychiatric problems, chronic depressive symptoms, chronic psychotic symptoms  Sensorium: alert and oriented to person, place, time and situation  Cognition: recent and remote memory grossly intact  Consciousness: alert and awake  Attention: attention span and concentration are age appropriate  Intellect: appears to be of average intelligence  Insight: limited  Judgement: impaired  Motor Activity: no abnormal movements     Vitals  Temp:  [97.9 °F (36.6 °C)] 97.9 °F (36.6 °C)  HR:  [] 98  Resp:  [16-18] 18  BP: (124-134)/(72-77) 133/76  SpO2:  [96 %-99 %] 96 %  No intake or output data in the 24 hours ending 09/24/24 0922    Lab Results: All Labs For Current Hospital Admission Reviewed    Current Facility-Administered Medications   Medication Dose Route Frequency Provider Last Rate    acetaminophen  650 mg Oral Q4H PRN Jordan C Holter,       acetaminophen  650 mg Oral Q6H PRN HOLLI Lion      aluminum-magnesium hydroxide-simethicone  30 mL " Oral Q4H PRN Jordan C Holter, DO      amLODIPine  5 mg Oral Daily STEVE LionNP      ARIPiprazole  15 mg Oral Daily Bora Rosario MD      Artificial Tears  1 drop Both Eyes Q3H PRN Jordan C Holter, DO      atropine  1 drop Sublingual HS Harjeet Torres, DO      haloperidol lactate  2.5 mg Intramuscular Q4H PRN Max 4/day STEVE LionNP      And    LORazepam  1 mg Intramuscular Q4H PRN Max 4/day STEVE LionNP      And    benztropine  0.5 mg Intramuscular Q4H PRN Max 4/day HOLLI Lion      benztropine  1 mg Intramuscular Q4H PRN Max 6/day Jordan C Holter, DO      haloperidol lactate  5 mg Intramuscular Q4H PRN Max 4/day HOLLI Lion      And    LORazepam  2 mg Intramuscular Q4H PRN Max 4/day HOLLI Lion      And    benztropine  1 mg Intramuscular Q4H PRN Max 4/day HOLLI Lion      benztropine  1 mg Oral Q4H PRN Max 6/day HOLLI Lion      benztropine  1 mg Oral Q4H PRN Max 6/day Jordan C Holter, DO      bisacodyl  10 mg Rectal Daily PRN HOLLI Lion      calcium carbonate  500 mg Oral BID PRN HOLLI Monge      cloZAPine  200 mg Oral Before Dinner Bora Rosario MD      cloZAPine  300 mg Oral HS Bora Rosario MD      cyanocobalamin  1,000 mcg Oral Daily HOLLI Galvan      hydrOXYzine HCL  50 mg Oral Q6H PRN Max 4/day Jordan C Holter,       Or    diphenhydrAMINE  50 mg Intramuscular Q6H PRN Jordan C Holter, DO      hydrOXYzine HCL  50 mg Oral Q6H PRN Max 4/day HOLLI Lion      Or    diphenhydrAMINE  50 mg Intramuscular Q6H PRN HOLLI Lion      diphenhydrAMINE-zinc acetate   Topical BID PRN HOLLI Lion      escitalopram  20 mg Oral Daily HOLLI Lion      famotidine  20 mg Oral BID HOLLI Monge      fluticasone  1 spray Each Nare Daily Brina Guillen MD      glycopyrrolate  1 mg Oral BID (AM & Afternoon) Bora Rosario MD      glycopyrrolate  2 mg Oral HS Bora Rosario MD      haloperidol  1 mg Oral  Q6H PRN HOLLI Lion      haloperidol  2.5 mg Oral Q4H PRN Max 4/day HOLLI Lion      haloperidol  5 mg Oral Q4H PRN Max 4/day HOLLI Lion      hydroCHLOROthiazide  12.5 mg Oral Daily HOLLI Galvan      hydrocortisone   Topical 4x Daily PRN HOLLI Lion      hydrOXYzine HCL  100 mg Oral Q6H PRN Max 4/day [facility] Holter, DO      Or    LORazepam  2 mg Intramuscular Q6H PRN [facility] Holter, DO      hydrOXYzine HCL  100 mg Oral Q6H PRN Max 4/day HOLLI Lion      Or    LORazepam  2 mg Intramuscular Q6H PRN HOLLI Lion      hydrOXYzine HCL  25 mg Oral Q6H PRN Max 4/day [facility] Holter, DO      ibuprofen  600 mg Oral Q8H PRN HOLLI Lion      loratadine  10 mg Oral Daily Brina Guillen MD      melatonin  3 mg Oral HS PRN Bora Rosario MD      metFORMIN  500 mg Oral BID With Meals HOLLI Galvan      methocarbamol  500 mg Oral Q6H PRN HOLLI Lion      nicotine polacrilex  2 mg Oral Q4H PRN Bora Rosario MD      OLANZapine  5 mg Oral Q4H PRN Max 3/day [facility] Holter, DO      Or    OLANZapine  2.5 mg Intramuscular Q4H PRN Max 3/day [facility] Holter, DO      OLANZapine  5 mg Oral Q3H PRN Max 3/day [facility] Holter, DO      Or    OLANZapine  5 mg Intramuscular Q3H PRN Max 3/day [facility] Holter, DO      OLANZapine  2.5 mg Oral Q4H PRN Max 6/day [facility] Holter, DO      ondansetron  4 mg Oral Q6H PRN HOLLI Lion      [facility]oprazole  [facility]      polyethylene glycol  17 g Oral Daily PRN HOLLI Ghotra      propranolol  10 mg Oral Q12H KAYLIE HOLLI Lion      senna-docusate sodium  1 tablet Oral Daily PRN [facility] Holter, DO      senna-docusate sodium  2 tablet Oral HS Melvina Ambron, DO      traZODone  50 mg Oral HS PRN HOLLI Lion      white petrolatum-mineral oil   Topical TID PRN HOLLI Lion         Counseling / Coordination of Care: Total floor / unit time spent today 15 minutes. Greater than 50% of  total time was spent with the patient and / or family counseling and / or somewhat receptive to supportive listening and teaching positive coping skills to deal with symptom mangement.     Patient's Rights, confidentiality and exceptions to confidentiality, use of automated medical record, Behavioral [facility] Services staff access to medical record, and consent to treatment reviewed.    This note has been dictated and hence there may be problems with punctuation, spelling and formatting and if anyone has any concerns please address them to the writer.  This note is not shared with patient due to potential for making patient's condition worse by knowing the content of the note.    Eveline WALLACE

## 2024-09-24 NOTE — PROGRESS NOTES
09/24/24 0735   Team Meeting   Meeting Type Daily Rounds   Team Members Present   Team Members Present Physician;Nurse;;Other (Discipline and Name)   Patient/Family Present   Patient Present No   Patient's Family Present No     In attendance:  MD Guy Gamino, RN  Luisana Alvarado, [facility]  Carla Lux LCSW    Groups: 8/10    Pt losing [facility] coverage 9/30 and will have primary Medicare. Pt appropriate with female peer. Flat/blunted affect. Pt reports ongoing AH. Pt went to sleep early and slept well. CRR wait list.

## 2024-09-24 NOTE — SOCIAL WORK
SW checked in with pt.   Pt denied any current concerns or needs. Pt reports he has not heard from his aunt lately. SW shared ongoing attempts to connect with his family.

## 2024-09-24 NOTE — PLAN OF CARE
Problem: Alteration in Thoughts and Perception  Goal: Treatment Goal: Gain control of psychotic behaviors/thinking, reduce/eliminate presenting symptoms and demonstrate improved reality functioning upon discharge  Outcome: Progressing  Goal: Verbalize thoughts and feelings  Description: Interventions:  - Promote a nonjudgmental and trusting relationship with the patient through active listening and therapeutic communication  - Assess patient's level of functioning, behavior and potential for risk  - Engage patient in 1 on 1 interactions  - Encourage patient to express fears, feelings, frustrations, and discuss symptoms    - [place] patient to reality, help patient recognize reality-based thinking   - Administer medications as ordered and assess for potential side effects  - Provide the patient education related to the signs and symptoms of the illness and desired effects of prescribed medications  Outcome: Progressing  Goal: Refrain from acting on delusional thinking/internal stimuli  Description: Interventions:  - Monitor patient closely, per order   - Utilize least restrictive measures   - Set reasonable limits, give positive feedback for acceptable   - Administer medications as ordered and monitor of potential side effects  Outcome: Progressing  Goal: Agree to be compliant with medication regime, as prescribed and report medication side effects  Description: Interventions:  - Offer appropriate PRN medication and supervise ingestion; conduct AIMS, as needed   Outcome: Progressing  Goal: Attend and participate in unit activities, including therapeutic, recreational, and educational groups  Description: Interventions:  -Encourage Visitation and family involvement in care  Outcome: Progressing  Goal: Recognize dysfunctional thoughts, communicate reality-based thoughts at the time of discharge  Description: Interventions:  - Provide medication and psycho-education to assist patient in compliance and developing  insight into his/her illness   Outcome: Progressing  Goal: Complete daily ADLs, including personal hygiene independently, as able  Description: Interventions:  - Observe, teach, and assist patient with ADLS  - Monitor and promote a balance of rest/activity, with adequate nutrition and elimination   Outcome: Progressing     Problem: Ineffective Coping  Goal: Participates in unit activities  Description: Interventions:  - Provide therapeutic environment   - Provide required programming   - Redirect inappropriate behaviors   Outcome: Progressing  Goal: Patient/Family participate in treatment and DC plans  Description: Interventions:  - Provide therapeutic environment  Outcome: Progressing  Goal: Patient/Family verbalizes awareness of resources  Outcome: Progressing     Problem: Depression  Goal: Treatment Goal: Demonstrate behavioral control of depressive symptoms, verbalize feelings of improved mood/affect, and adopt new coping skills prior to discharge  Outcome: Progressing  Goal: Verbalize thoughts and feelings  Description: Interventions:  - Assess and re-assess patient's level of risk   - Engage patient in 1:1 interactions, daily, for a minimum of 15 minutes   - Encourage patient to express feelings, fears, frustrations, hopes   Outcome: Progressing  Goal: Refrain from isolation  Description: Interventions:  - Develop a trusting relationship   - Encourage socialization   Outcome: Progressing  Goal: Attend and participate in unit activities, including therapeutic, recreational, and educational groups  Description: Interventions:  - Provide therapeutic and educational activities daily, encourage attendance and participation, and document same in the medical record   Outcome: Progressing  Goal: Complete daily ADLs, including personal hygiene independently, as able  Description: Interventions:  - Observe, teach, and assist patient with ADLS  -  Monitor and promote a balance of rest/activity, with adequate nutrition  and elimination   Outcome: Progressing     Problem: Anxiety  Goal: Anxiety is at manageable level  Description: Interventions:  - Assess and monitor patient's anxiety level.   - Monitor for signs and symptoms (heart palpitations, chest pain, shortness of breath, headaches, nausea, feeling jumpy, restlessness, irritable, apprehensive).   - Collaborate with interdisciplinary team and initiate plan and interventions as ordered.  - [place] patient to unit/surroundings  - Explain treatment plan  - Encourage participation in care  - Encourage verbalization of concerns/fears  - Identify coping mechanisms  - Assist in developing anxiety-reducing skills  - Administer/offer alternative therapies  - Limit or eliminate stimulants  Outcome: Progressing     Problem: Alteration in Orientation  Goal: Treatment Goal: Demonstrate a reduction of confusion and improved orientation to person, place, time and/or situation upon discharge, according to optimum baseline/ability  Outcome: Progressing  Goal: Interact with staff daily  Description: Interventions:  - Assess and re-assess patient's level of orientation  - Engage patient in 1 on 1 interactions, daily, for a minimum of 15 minutes   - Establish rapport/trust with patient   Outcome: Progressing  Goal: Complete daily ADLs, including personal hygiene independently, as able  Description: Interventions:  - Observe, teach, and assist patient with ADLS  Outcome: Progressing     Problem: DISCHARGE PLANNING - CARE MANAGEMENT  Goal: Discharge to post-acute care or home with appropriate resources  Description: INTERVENTIONS:  - Conduct assessment to determine patient/family and health care team treatment goals, and need for post-acute services based on payer coverage, community resources, and patient preferences, and barriers to discharge  - Address psychosocial, clinical, and financial barriers to discharge as identified in assessment in conjunction with the patient/family and health care  team  - Arrange appropriate level of post-acute services according to patient's   needs and preference and payer coverage in collaboration with the physician and health care team  - Communicate with and update the patient/family, physician, and health care team regarding progress on the discharge plan  - Arrange appropriate transportation to post-acute venues  Outcome: Progressing

## 2024-09-24 NOTE — PROGRESS NOTES
09/24/24 1000 09/24/24 1100 09/24/24 1300   Activity/Group Checklist   Group Wellness Life Skills  (anxiety-grounding techniques) Personal control  (getting to know you)   Attendance Attended Attended Attended   Attendance Duration (min) 46-60 46-60 46-60   Interactions Interacted appropriately Interacted appropriately Interacted appropriately   Affect/Mood Appropriate Appropriate Appropriate   Goals Achieved Able to engage in interactions;Able to listen to others Identified feelings;Identified triggers;Discussed coping strategies;Discussed discharge plans;Able to listen to others;Able to engage in interactions;Able to self-disclose Able to engage in interactions;Able to listen to others

## 2024-09-24 NOTE — NURSING NOTE
"Pt is accepting of medications without incidence and meal compliant. Pt is social with select peers, depressed and reports \"the same\" AH, but states they're \" doing a little better today\". Pt otherwise offers no new concerns.   "

## 2024-09-25 PROCEDURE — 99232 SBSQ HOSP IP/OBS MODERATE 35: CPT | Performed by: PSYCHIATRY & NEUROLOGY

## 2024-09-25 RX ADMIN — NICOTINE POLACRILEX 2 MG: 2 GUM, CHEWING ORAL at 17:12

## 2024-09-25 RX ADMIN — ATROPINE SULFATE 1 DROP: 10 SOLUTION/ DROPS OPHTHALMIC at 21:17

## 2024-09-25 RX ADMIN — PANTOPRAZOLE SODIUM 20 MG: 20 TABLET, DELAYED RELEASE ORAL at 09:08

## 2024-09-25 RX ADMIN — CLOZAPINE 200 MG: 200 TABLET ORAL at 17:00

## 2024-09-25 RX ADMIN — PROPRANOLOL HYDROCHLORIDE 10 MG: 10 TABLET ORAL at 21:16

## 2024-09-25 RX ADMIN — HYDROCHLOROTHIAZIDE 12.5 MG: 12.5 TABLET ORAL at 09:08

## 2024-09-25 RX ADMIN — ESCITALOPRAM OXALATE 20 MG: 10 TABLET, FILM COATED ORAL at 09:07

## 2024-09-25 RX ADMIN — NICOTINE POLACRILEX 2 MG: 2 GUM, CHEWING ORAL at 21:16

## 2024-09-25 RX ADMIN — ARIPIPRAZOLE 15 MG: 15 TABLET ORAL at 09:09

## 2024-09-25 RX ADMIN — METFORMIN HYDROCHLORIDE 500 MG: 500 TABLET, FILM COATED ORAL at 17:11

## 2024-09-25 RX ADMIN — GLYCOPYRROLATE 1 MG: 1 TABLET ORAL at 13:05

## 2024-09-25 RX ADMIN — FAMOTIDINE 20 MG: 20 TABLET ORAL at 17:11

## 2024-09-25 RX ADMIN — AMLODIPINE BESYLATE 5 MG: 5 TABLET ORAL at 09:08

## 2024-09-25 RX ADMIN — PROPRANOLOL HYDROCHLORIDE 10 MG: 10 TABLET ORAL at 09:08

## 2024-09-25 RX ADMIN — SENNOSIDES AND DOCUSATE SODIUM 2 TABLET: 8.6; 5 TABLET ORAL at 21:16

## 2024-09-25 RX ADMIN — NICOTINE POLACRILEX 2 MG: 2 GUM, CHEWING ORAL at 09:08

## 2024-09-25 RX ADMIN — CLOZAPINE 300 MG: 200 TABLET ORAL at 21:16

## 2024-09-25 RX ADMIN — METFORMIN HYDROCHLORIDE 500 MG: 500 TABLET, FILM COATED ORAL at 09:07

## 2024-09-25 RX ADMIN — NICOTINE POLACRILEX 2 MG: 2 GUM, CHEWING ORAL at 12:26

## 2024-09-25 RX ADMIN — CYANOCOBALAMIN TAB 1000 MCG 1000 MCG: 1000 TAB at 09:08

## 2024-09-25 RX ADMIN — GLYCOPYRROLATE 1 MG: 1 TABLET ORAL at 09:08

## 2024-09-25 RX ADMIN — FAMOTIDINE 20 MG: 20 TABLET ORAL at 09:08

## 2024-09-25 RX ADMIN — LORATADINE 10 MG: 10 TABLET ORAL at 09:08

## 2024-09-25 RX ADMIN — GLYCOPYRROLATE 2 MG: 1 TABLET ORAL at 21:17

## 2024-09-25 NOTE — PROGRESS NOTES
09/25/24 1000 09/25/24 1100   Activity/Group Checklist   Group Wellness Life Skills  (music and mindfulness activity)   Attendance Did not attend Attended   Attendance Duration (min)  --  46-60   Interactions  --  Interacted appropriately   Affect/Mood  --  Appropriate   Goals Achieved  --  Identified feelings;Discussed coping strategies;Able to listen to others;Able to engage in interactions;Able to self-disclose

## 2024-09-25 NOTE — NURSING NOTE
Received Pt at 1100. Pt calm and cooperative. Visible and social w/ peers; playing games and pacing calmly in the halls. Appears depressed, but brightens on approach. Attends partial groups. +meals and meds. Good appetite. Compliant w/ mouth check. On assessment denies SI/HI. Endorses fluctuating mild to moderate depression which he describes as his baseline. Pt also endorses A/bautista that threaten to hurt him or his family which are currently tolerable and he also describes as his baseline. Staff support provided. Pt denies any issues or concerns at this time. Q7's maintained. Will cont to provide support as needed.

## 2024-09-25 NOTE — PROGRESS NOTES
09/25/24 9564   Team Meeting   Meeting Type Daily Rounds   Team Members Present   Team Members Present Physician;Nurse;;Other (Discipline and Name)   Patient/Family Present   Patient Present No   Patient's Family Present No     In attendance:  Dr. Alex Thomas, MD Dr. Jordan Holter, DO Daniel Teles, RN  Luisana Alvarado, [facility]  Carla Lux, [facility]    Groups: 9/10    Pt denies symptoms other than ongoing AH. No bx issues noted. Pt remains engaged and cooperative. CRR waitlist.

## 2024-09-25 NOTE — PROGRESS NOTES
"Psychiatry Progress Note [facility]    Alberto Berumen 28 y.o. male MRN: [id number]  Unit/Bed#: [facility] 108-02 Encounter: [id number]  Code Status: Level 1 - Full Code    PCP: Joce Juan MD    Date of Admission:  3/29/2024 2008   Date of Service:  09/25/24    Patient Active Problem List   Diagnosis    GERD (gastroesophageal reflux disease)    Medical clearance for psychiatric admission    Schizoaffective disorder, bipolar type (HCC)    Tobacco abuse    T wave inversion in EKG    Syringoma    Chronic idiopathic constipation    Vitamin B 12 deficiency    Vitamin D deficiency    Confluent and reticulate papillomatosis    Class 2 obesity in adult    Primary hypertension    Elevated hemoglobin A1c    Bilateral lower extremity edema         Review of systems: No symptoms reported  Psychiatric Diagnosis: Schizoaffective Disorder, Bipolar Type     Assessment  Overall Status: Patient reported feeling well rested and slept well last night. He reported feeling \"stable\" and rated his depression symptoms 5/10 today. He continues to hear the threatening auditory hallucinations but confirmed that they still remain quieter today than they have been in the past.    Certification Statement: The patient will continue to require additional inpatient hospital stay due to continued psychotic and severe symptoms of depression      Assessment & Plan  Schizoaffective disorder, bipolar type (HCC)  Schizoaffective Disorder, Bipolar Type  Continues to have depression symptoms and frequent, threatening hallucinations.   Continues to have paranoid delusions with a blunted affect  Appears to be making small improvements with his symptoms    Continue aripiprazole 15 mg tablet daily  Continue clozapine 200 mg tablet daily before dinner  Continue clozapine 300 gm tablet daily before bed  Continue cyanocobalamin 1000 mcg tablet daily  Continue escitalopram 20 mg tablet daily  Continue senna-docusate sodium two 50 mg tablets " "daily before bed  Tobacco abuse    GERD (gastroesophageal reflux disease)  Continue famotidine 20 mg tablet BID per medical team  Continue glycopyrrolate 1 mg tablet BID AM and afternoon per medical team  Continue glycopyrrolate 2 mg tablet before bed per medical team  Continue pantoprazole EC 20 mg tablet every morning per medical team  Primary hypertension  Continue amlodipine 5 mg tablet daily per medical team   Continue hydrochlorothiazide 12.5 mg tablet daily per medical team  Elevated hemoglobin A1c  Continue metformin 500 mg tablet BID with meals per medical team  Chronic idiopathic constipation  Continue senna-docusate sodium two 50 mg tablets before bed per medical team  Class 2 obesity in adult           Medications:   Abilify 15 mg po daily, clozaril 200 mg po at dinner and 300 mg po HS, B12 1000 mg po daily, lexapro 20 mg po daily, glycopyrrolate 1 mg BID and 2 mg HS, propranolol 10 mg po BID, senna docusate sodium 2 tablets po HS  Side effects from treatment: Reported \"a little\" drooling last night but less than normal    Medication changes   None   Medication education   Risks side effects benefits and precautions of medications discussed with patient and he did verbalize an understanding about risks for metabolic syndrome from being on neuroleptics and is form tardive dyskinesia etc.  All medications reviewed and I recommend that they be continued for symptom management   Understanding of medications: Verbalized    Justification for dual anti-psychotics: Inability to control psychotic symptoms      Non-pharmacological treatments  Continue with individual, group, milieu and occupational therapy using recovery principles and psycho-education about accepting illness and the need for treatment.    Safety  Safety and communication plan established to target dynamic risk factors discussed above.    Discharge Plan   Aunt's house vs group home    Interval Progress   Patient reported that he slept well last " "night and feels rested. He stated that he felt like he did not drool a lot last night and was pleased with that. He reported that his mood is \"stable\" but rated his depression symptoms 5/10 today. He denied any other reason besides the voices in his head making him feel this way. He reported his appetite has been good. He continues to hear threatening auditory hallucinations and reported that some days are better than others. He stated that today he has been having a better day but yesterday was a bad day for him. He could not report how frequently he feels he has a really good or really bad day. He continues to report he feels the voices are out to get him but that he feels safe in the hospital. He denied any suicidal or homicidal thoughts, plans, or intents. He mentioned that he would prefer to live with his aunt after discharge, but has not been able to get a hold of her and he said he hasn't talked to her in several days. He expressed some worry about how he will feel after discharge and discussed concerns that he will not be able to feel safe from the voices. He discussed how he feels safe in the hospital but is fearful that he will not feel safe in his aunt's house or in a group home because of the voices.   Acceptance by patient: Yes   Hopefulness in recovery: Hoping to live with aunt   Involved in reintegration process: Aunt   Trusting in relationship with psychiatrist: Yes  Sleep: Good, 8+ hours  Appetite: Good, 100%/100%/100%  Compliance with Medications: Good  Group attendance: 9/10  Significant events: None reported      Mental Status Exam  Appearance: age appropriate, overweight, wearing personal sweatshirt with restrepo up and hospital scrub pants  Behavior: cooperative, calm, good  eye contact  Speech: normal rate, normal pitch, scant, soft  Mood: depressed  Affect:  depressed, flat, mood congruent   Thought Process: organized, linear  Thought Content: paranoid ideation, stating that he feels the voices " "he hears are out to get him. He stated he feels safe in the hospital. He denied any suicidal or homicidal thoughts, plans, or intents. No obsessions, compulsions, or distorted body perceptions verbalized. He denied any other experiences that would be categorized as grandiose, somatic, or bizarre delusions.    Perceptual Disturbances: auditory hallucinations stating that they are going to kill him and his family. He states that the voices \"come and go\" and that some days are better than others, but that he hears them every day. He reported that today they are quieter than usual. He reports that sometimes the voices sound like his ex girlfriend and that sometimes they sound like strangers to him., denies visual hallucinations when asked, does not appear responding to internal stimuli  Hx Risk Factors: chronic psychiatric problems, chronic depressive symptoms, chronic psychotic symptoms  Sensorium: alert and oriented to person, place, time and situation  Cognition: recent and remote memory grossly intact  Consciousness: alert and awake  Attention: attention span and concentration are age appropriate  Intellect: appears to be of average intelligence  Insight: limited  Judgement: impaired  Motor Activity: no abnormal movements     Vitals  Temp:  [97.5 °F (36.4 °C)] 97.5 °F (36.4 °C)  HR:  [] 103  Resp:  [18] 18  BP: (121-130)/(76-83) 121/76  SpO2:  [97 %] 97 %  No intake or output data in the 24 hours ending 09/25/24 1010    Lab Results: All Labs For Current Hospital Admission Reviewed    Current Facility-Administered Medications   Medication Dose Route Frequency Provider Last Rate    acetaminophen  650 mg Oral Q4H PRN Jordan C Holter, DO      acetaminophen  650 mg Oral Q6H PRN HOLLI Lion      aluminum-magnesium hydroxide-simethicone  30 mL Oral Q4H PRN Jordan C Holter, DO      amLODIPine  5 mg Oral Daily HOLLI Lion      ARIPiprazole  15 mg Oral Daily Bora Rosario MD      Artificial Tears  1 drop " Both Eyes Q3H PRN Ion FISCHER Holter, DO      atropine  1 drop Sublingual HS Harjeet Torres, DO      haloperidol lactate  2.5 mg Intramuscular Q4H PRN Max 4/day Eveline Hunt CRNP      And    LORazepam  1 mg Intramuscular Q4H PRN Max 4/day Evelinevipul Hunt, CRNP      And    benztropine  0.5 mg Intramuscular Q4H PRN Max 4/day Evelinevipul Hunt, CRNP      benztropine  1 mg Intramuscular Q4H PRN Max 6/day Ion Dupontter, DO      haloperidol lactate  5 mg Intramuscular Q4H PRN Max 4/day Evelinevipul Hunt, CRNP      And    LORazepam  2 mg Intramuscular Q4H PRN Max 4/day Eveline Hunt CRNP      And    benztropine  1 mg Intramuscular Q4H PRN Max 4/day Eveline Hunt CRNP      benztropine  1 mg Oral Q4H PRN Max 6/day Eveline Hunt CRNP      benztropine  1 mg Oral Q4H PRN Max 6/day Jordan C Holter, DO      bisacodyl  10 mg Rectal Daily PRN STEVE LionNP      calcium carbonate  500 mg Oral BID PRN HOLLI Monge      cloZAPine  200 mg Oral Before Dinner Bora Rosario MD      cloZAPine  300 mg Oral HS Bora Rosario MD      cyanocobalamin  1,000 mcg Oral Daily HOLLI Galvan      hydrOXYzine HCL  50 mg Oral Q6H PRN Max 4/day Jordan C Holter, DO      Or    diphenhydrAMINE  50 mg Intramuscular Q6H PRN Jordan C Holter, DO      hydrOXYzine HCL  50 mg Oral Q6H PRN Max 4/day HOLLI Lion      Or    diphenhydrAMINE  50 mg Intramuscular Q6H PRN HOLLI Lion      diphenhydrAMINE-zinc acetate   Topical BID PRN HOLLI Lion      escitalopram  20 mg Oral Daily HOLLI Lion      famotidine  20 mg Oral BID HOLLI Monge      fluticasone  1 spray Each Nare Daily Brina Guillen MD      glycopyrrolate  1 mg Oral BID (AM & Afternoon) Bora Rosario MD      glycopyrrolate  2 mg Oral HS Bora Rosario MD      haloperidol  1 mg Oral Q6H PRN HOLLI Lion      haloperidol  2.5 mg Oral Q4H PRN Max 4/day HOLLI Lion      haloperidol  5 mg Oral Q4H PRN Max 4/day HOLLI Lion       hydroCHLOROthiazide  12.5 mg Oral Daily HOLLI Galvan      hydrocortisone   Topical 4x Daily PRN HOLLI Lion      hydrOXYzine HCL  100 mg Oral Q6H PRN Max 4/day [facility] Holter, DO      Or    LORazepam  2 mg Intramuscular Q6H PRN [facility] Holter, DO      hydrOXYzine HCL  100 mg Oral Q6H PRN Max 4/day HOLLI Lion      Or    LORazepam  2 mg Intramuscular Q6H PRN HOLLI Lion      hydrOXYzine HCL  25 mg Oral Q6H PRN Max 4/day [facility] Holter, DO      ibuprofen  600 mg Oral Q8H PRN HOLLI Lion      loratadine  10 mg Oral Daily Brina Guillen MD      melatonin  3 mg Oral HS PRN Bora Rosario MD      metFORMIN  500 mg Oral BID With Meals HOLLI Galvan      methocarbamol  500 mg Oral Q6H PRN HOLLI Lion      nicotine polacrilex  2 mg Oral Q4H PRN Bora Rosario MD      OLANZapine  5 mg Oral Q4H PRN Max 3/day [facility] Holter, DO      Or    OLANZapine  2.5 mg Intramuscular Q4H PRN Max 3/day [facility] Holter, DO      OLANZapine  5 mg Oral Q3H PRN Max 3/day [facility] Holter, DO      Or    OLANZapine  5 mg Intramuscular Q3H PRN Max 3/day [facility] Holter, DO      OLANZapine  2.5 mg Oral Q4H PRN Max 6/day [facility] Holter, DO      ondansetron  4 mg Oral Q6H PRN HOLLI Lion      [facility]oprazole  [facility]      polyethylene glycol  17 g Oral Daily PRN HOLLI Ghotra      propranolol  10 mg Oral Q12H KAYLIE HOLLI Lion      senna-docusate sodium  1 tablet Oral Daily PRN [facility] Holter, DO      senna-docusate sodium  2 tablet Oral HS Melvina Ambron, DO      traZODone  50 mg Oral HS PRN HOLLI Lion      white petrolatum-mineral oil   Topical TID PRN HOLLI Lion         Counseling / Coordination of Care: Total floor / unit time spent today 15 minutes. Greater than 50% of total time was spent with the patient and / or family counseling and / or somewhat receptive to supportive listening and teaching positive coping skills to deal with  symptom mangement.     Patient's Rights, confidentiality and exceptions to confidentiality, use of automated medical record, [facility] staff access to medical record, and consent to treatment reviewed.    This note has been dictated and hence there may be problems with punctuation, spelling and formatting and if anyone has any concerns please address them to the writer.  This note is not shared with patient due to potential for making patient's condition worse by knowing the content of the note.    Eveline WALLACE

## 2024-09-25 NOTE — PLAN OF CARE
Problem: Alteration in Thoughts and Perception  Goal: Treatment Goal: Gain control of psychotic behaviors/thinking, reduce/eliminate presenting symptoms and demonstrate improved reality functioning upon discharge  Outcome: Progressing  Goal: Verbalize thoughts and feelings  Description: Interventions:  - Promote a nonjudgmental and trusting relationship with the patient through active listening and therapeutic communication  - Assess patient's level of functioning, behavior and potential for risk  - Engage patient in 1 on 1 interactions  - Encourage patient to express fears, feelings, frustrations, and discuss symptoms    - [place] patient to reality, help patient recognize reality-based thinking   - Administer medications as ordered and assess for potential side effects  - Provide the patient education related to the signs and symptoms of the illness and desired effects of prescribed medications  Outcome: Progressing  Goal: Refrain from acting on delusional thinking/internal stimuli  Description: Interventions:  - Monitor patient closely, per order   - Utilize least restrictive measures   - Set reasonable limits, give positive feedback for acceptable   - Administer medications as ordered and monitor of potential side effects  Outcome: Progressing  Goal: Agree to be compliant with medication regime, as prescribed and report medication side effects  Description: Interventions:  - Offer appropriate PRN medication and supervise ingestion; conduct AIMS, as needed   Outcome: Progressing  Goal: Attend and participate in unit activities, including therapeutic, recreational, and educational groups  Description: Interventions:  -Encourage Visitation and family involvement in care  Outcome: Progressing  Goal: Recognize dysfunctional thoughts, communicate reality-based thoughts at the time of discharge  Description: Interventions:  - Provide medication and psycho-education to assist patient in compliance and developing  insight into his/her illness   Outcome: Progressing  Goal: Complete daily ADLs, including personal hygiene independently, as able  Description: Interventions:  - Observe, teach, and assist patient with ADLS  - Monitor and promote a balance of rest/activity, with adequate nutrition and elimination   Outcome: Progressing     Problem: Ineffective Coping  Goal: Participates in unit activities  Description: Interventions:  - Provide therapeutic environment   - Provide required programming   - Redirect inappropriate behaviors   Outcome: Progressing  Goal: Patient/Family participate in treatment and DC plans  Description: Interventions:  - Provide therapeutic environment  Outcome: Progressing  Goal: Patient/Family verbalizes awareness of resources  Outcome: Progressing     Problem: Depression  Goal: Verbalize thoughts and feelings  Description: Interventions:  - Assess and re-assess patient's level of risk   - Engage patient in 1:1 interactions, daily, for a minimum of 15 minutes   - Encourage patient to express feelings, fears, frustrations, hopes   Outcome: Progressing  Goal: Refrain from harming self  Description: Interventions:  - Monitor patient closely, per order   - Supervise medication ingestion, monitor effects and side effects   Outcome: Progressing  Goal: Refrain from isolation  Description: Interventions:  - Develop a trusting relationship   - Encourage socialization   Outcome: Progressing     Problem: Anxiety  Goal: Anxiety is at manageable level  Description: Interventions:  - Assess and monitor patient's anxiety level.   - Monitor for signs and symptoms (heart palpitations, chest pain, shortness of breath, headaches, nausea, feeling jumpy, restlessness, irritable, apprehensive).   - Collaborate with interdisciplinary team and initiate plan and interventions as ordered.  - [place] patient to unit/surroundings  - Explain treatment plan  - Encourage participation in care  - Encourage verbalization of  concerns/fears  - Identify coping mechanisms  - Assist in developing anxiety-reducing skills  - Administer/offer alternative therapies  - Limit or eliminate stimulants  Outcome: Progressing     Problem: Alteration in Orientation  Goal: Treatment Goal: Demonstrate a reduction of confusion and improved orientation to person, place, time and/or situation upon discharge, according to optimum baseline/ability  Outcome: Progressing  Goal: Interact with staff daily  Description: Interventions:  - Assess and re-assess patient's level of orientation  - Engage patient in 1 on 1 interactions, daily, for a minimum of 15 minutes   - Establish rapport/trust with patient   Outcome: Progressing  Goal: Attend and participate in unit activities, including therapeutic, recreational, and educational groups  Description: Interventions:  - Provide therapeutic and educational activities daily, encourage attendance and participation, and document same in the medical record   - Provide appropriate opportunities for reminiscence   - Provide a consistent daily routine   - Encourage family contact/visitation   Outcome: Progressing  Goal: Complete daily ADLs, including personal hygiene independently, as able  Description: Interventions:  - Observe, teach, and assist patient with ADLS  Outcome: Progressing     Problem: DISCHARGE PLANNING - CARE MANAGEMENT  Goal: Discharge to post-acute care or home with appropriate resources  Description: INTERVENTIONS:  - Conduct assessment to determine patient/family and health care team treatment goals, and need for post-acute services based on payer coverage, community resources, and patient preferences, and barriers to discharge  - Address psychosocial, clinical, and financial barriers to discharge as identified in assessment in conjunction with the patient/family and health care team  - Arrange appropriate level of post-acute services according to patient's   needs and preference and payer coverage in  collaboration with the physician and health care team  - Communicate with and update the patient/family, physician, and health care team regarding progress on the discharge plan  - Arrange appropriate transportation to post-acute venues  Outcome: Progressing

## 2024-09-25 NOTE — NURSING NOTE
"Alberto was visible this evening. He ambulated in the bautista with female peer ( [place]) He attended the Nursing Group at 1930 and said he still has the AH and they contribute to his anxiety and depression. He said he \"has it under control. He did not say too much about it though. His affect was somewhat flat  "

## 2024-09-26 LAB
CK SERPL-CCNC: 264 U/L (ref 39–308)
CLOZAPINE SERPL-MCNC: 944 NG/ML (ref 350–900)

## 2024-09-26 PROCEDURE — 99232 SBSQ HOSP IP/OBS MODERATE 35: CPT | Performed by: PSYCHIATRY & NEUROLOGY

## 2024-09-26 PROCEDURE — 82550 ASSAY OF CK (CPK): CPT

## 2024-09-26 PROCEDURE — 80159 DRUG ASSAY CLOZAPINE: CPT

## 2024-09-26 RX ADMIN — LORATADINE 10 MG: 10 TABLET ORAL at 08:59

## 2024-09-26 RX ADMIN — FAMOTIDINE 20 MG: 20 TABLET ORAL at 17:45

## 2024-09-26 RX ADMIN — METFORMIN HYDROCHLORIDE 500 MG: 500 TABLET, FILM COATED ORAL at 17:45

## 2024-09-26 RX ADMIN — PROPRANOLOL HYDROCHLORIDE 10 MG: 10 TABLET ORAL at 20:59

## 2024-09-26 RX ADMIN — ESCITALOPRAM OXALATE 20 MG: 10 TABLET, FILM COATED ORAL at 08:59

## 2024-09-26 RX ADMIN — NICOTINE POLACRILEX 2 MG: 2 GUM, CHEWING ORAL at 13:17

## 2024-09-26 RX ADMIN — CLOZAPINE 200 MG: 200 TABLET ORAL at 17:45

## 2024-09-26 RX ADMIN — PANTOPRAZOLE SODIUM 20 MG: 20 TABLET, DELAYED RELEASE ORAL at 08:58

## 2024-09-26 RX ADMIN — NICOTINE POLACRILEX 2 MG: 2 GUM, CHEWING ORAL at 22:00

## 2024-09-26 RX ADMIN — ATROPINE SULFATE 1 DROP: 10 SOLUTION/ DROPS OPHTHALMIC at 21:05

## 2024-09-26 RX ADMIN — GLYCOPYRROLATE 1 MG: 1 TABLET ORAL at 08:59

## 2024-09-26 RX ADMIN — NICOTINE POLACRILEX 2 MG: 2 GUM, CHEWING ORAL at 18:00

## 2024-09-26 RX ADMIN — ARIPIPRAZOLE 15 MG: 15 TABLET ORAL at 08:59

## 2024-09-26 RX ADMIN — GLYCOPYRROLATE 2 MG: 1 TABLET ORAL at 21:00

## 2024-09-26 RX ADMIN — CYANOCOBALAMIN TAB 1000 MCG 1000 MCG: 1000 TAB at 08:59

## 2024-09-26 RX ADMIN — GLYCOPYRROLATE 1 MG: 1 TABLET ORAL at 15:11

## 2024-09-26 RX ADMIN — NICOTINE POLACRILEX 2 MG: 2 GUM, CHEWING ORAL at 09:13

## 2024-09-26 RX ADMIN — SENNOSIDES AND DOCUSATE SODIUM 2 TABLET: 8.6; 5 TABLET ORAL at 21:00

## 2024-09-26 RX ADMIN — METFORMIN HYDROCHLORIDE 500 MG: 500 TABLET, FILM COATED ORAL at 08:59

## 2024-09-26 RX ADMIN — FAMOTIDINE 20 MG: 20 TABLET ORAL at 08:59

## 2024-09-26 RX ADMIN — CLOZAPINE 300 MG: 200 TABLET ORAL at 21:00

## 2024-09-26 NOTE — NURSING NOTE
"Visible on unit with select peer. Pleasant, polite  and cooperative. States \" still voices\" 'they want to kill me\" \" telling me I'm going to hell\" Emotional support given  Given Nicorette gum at 1712 & 2116. No behavior issues. Denies SI/HI. Compliant with meds and meals.  "

## 2024-09-26 NOTE — SOCIAL WORK
Attempted phone call to pt's aunt Hanh [phone]. Call rings but does not go to voicemail.   SW typed letter to aunt requesting communication and collaboration on pt's discharge planning. Sent via postal mail.

## 2024-09-26 NOTE — NURSING NOTE
Patient visible and social with select peers on the milieu. He is pleasant and cooperative. He had to be woken up for breakfast. He attended groups. Appetite is good. No behaviors.

## 2024-09-26 NOTE — PROGRESS NOTES
"Psychiatry Progress Note [facility]    Alberto Berumen 28 y.o. male MRN: [id number]  Unit/Bed#: [facility] 108-02 Encounter: [id number]  Code Status: Level 1 - Full Code    PCP: Joce Juan MD    Date of Admission:  3/29/2024 2008   Date of Service:  09/26/24    Patient Active Problem List   Diagnosis    GERD (gastroesophageal reflux disease)    Medical clearance for psychiatric admission    Schizoaffective disorder, bipolar type (HCC)    Tobacco abuse    T wave inversion in EKG    Syringoma    Chronic idiopathic constipation    Vitamin B 12 deficiency    Vitamin D deficiency    Confluent and reticulate papillomatosis    Class 2 obesity in adult    Primary hypertension    Elevated hemoglobin A1c    Bilateral lower extremity edema         Review of systems: No symptoms reported  Psychiatric Diagnosis: Schizoaffective Disorder, Bipolar Type    Assessment  Overall Status: Patient reported sleeping about 8 hours last night but that he did not feel rested. He reported feeling \"stable\" and rated his depression symptoms 5/10 today. He continues to hear the threatening auditory hallucinations but stated that they are about baseline for him today.    Certification Statement: The patient will continue to require additional inpatient hospital stay due to continued psychotic and severe symptoms of depression.      Assessment & Plan  Schizoaffective disorder, bipolar type (HCC)  Schizoaffective Disorder, Bipolar Type  Continues to have depression symptoms and frequent, threatening hallucinations.   Continues to have paranoid delusions with a blunted affect  Appears to be making small improvements with his symptoms    Continue aripiprazole 15 mg tablet daily  Continue clozapine 200 mg tablet daily before dinner  Continue clozapine 300 gm tablet daily before bed  Continue cyanocobalamin 1000 mcg tablet daily  Continue escitalopram 20 mg tablet daily  Continue senna-docusate sodium two 50 mg tablets daily before " bed  Tobacco abuse    GERD (gastroesophageal reflux disease)  Continue famotidine 20 mg tablet BID per medical team  Continue glycopyrrolate 1 mg tablet BID AM and afternoon per medical team  Continue glycopyrrolate 2 mg tablet before bed per medical team  Continue pantoprazole EC 20 mg tablet every morning per medical team  Primary hypertension  Continue amlodipine 5 mg tablet daily per medical team   Continue hydrochlorothiazide 12.5 mg tablet daily per medical team  Elevated hemoglobin A1c  Continue metformin 500 mg tablet BID with meals per medical team  Chronic idiopathic constipation  Continue senna-docusate sodium two 50 mg tablets before bed per medical team  Class 2 obesity in adult           Medications:   Abilify 15 mg po daily, clozaril 200 mg po at dinner and 300 mg po HS, B12 1000 mg po daily, lexapro 20 mg po daily, glycopyrrolate 1 mg BID and 2 mg HS, propranolol 10 mg po BID, senna docusate sodium 2 tablets po HS.  Side effects from treatment: Reported drooling slightly last night but confirmed it is better than before he started receiving the atropine drops.    Medication changes   None   Medication education   Risks side effects benefits and precautions of medications discussed with patient and he did verbalize an understanding about risks for metabolic syndrome from being on neuroleptics and is form tardive dyskinesia etc.  All medications reviewed and I recommend that they be continued for symptom management   Understanding of medications: Verbalized    Justification for dual anti-psychotics: Inability to control psychotic symptoms     Non-pharmacological treatments  Continue with individual, group, milieu and occupational therapy using recovery principles and psycho-education about accepting illness and the need for treatment.    Safety  Safety and communication plan established to target dynamic risk factors discussed above.    Discharge Plan   Aunt's house vs group home    Interval Progress  "  Patient reported that he slept well last night and feels rested. He stated that he felt like he did not drool much last night but confirms that he did a bit. He reported that his mood is \"stable\" and that his depression symptoms are a 5/10. He stated that he feels his depression is due mostly to the voices he hears. He continues to report hearing the threatening auditory hallucinations but that today they were at baseline for him. He continues to feel like the voices are out to get him but that he feels safe in the hospital. He denied any suicidal or homicidal thoughts, plans, or intents. He mentioned that he would prefer to live with his aunt after discharge but reported not thinking much about it today. He reported that he has been unable to get in contact with his aunt and that she has not been answering his phone calls. He reported going to groups yesterday and that he plans on attending them today as well. He stated that his goal for today was to \"stay out of my head as much as I can\".  Acceptance by patient: Yes   Hopefulness in recovery: Hoping to live with aunt   Involved in reintegration process: Aunt   Trusting in relationship with psychiatrist: Yes  Sleep: Good, approximately 8 hours  Appetite: Good, 100%/100%/100%  Compliance with Medications: Good  Group attendance: 6/10  Significant events: None reported      Mental Status Exam  Appearance: age appropriate, overweight, wearing personal sweatshirt with restrepo up and hospital scrub pants.   Behavior: cooperative, calm  Speech: normal rate, normal pitch, scant, soft  Mood: depressed  Affect:  depressed, flat, mood congruent  Thought Process: organized, linear  Thought Content: paranoid ideation, feeling like the voices in his head are out to get him. He states that he feels safe in the hospital despite feeling threatened by the voices. He denied any suicidal or homicidal thoughts, plans, or intents. No obsessions, compulsions, or distorted body " perceptions verbalized. He denied any other experiences that would be categorized as grandiose, somatic, or bizarre delusions.   Perceptual Disturbances: auditory hallucinations that tell him they are going to hurt him and his family. He states they come and go and some days they are louder and more frequent than others. Today, they are at baseline. He continues to report they either sound like his ex girlfriend or they sound like strangers to him., denies visual hallucinations when asked, does not appear responding to internal stimuli  Hx Risk Factors: chronic psychiatric problems, chronic depressive symptoms, chronic psychotic symptoms  Sensorium: alert and oriented to person, place, time and situation  Cognition: recent and remote memory grossly intact  Consciousness: alert and awake  Attention: attention span and concentration are age appropriate  Intellect: appears to be of average intelligence  Insight: limited  Judgement: impaired  Motor Activity: no abnormal movements     Vitals  Temp:  [97.6 °F (36.4 °C)-97.7 °F (36.5 °C)] 97.7 °F (36.5 °C)  HR:  [94-98] 94  Resp:  [18] 18  BP: (108-132)/(74-83) 108/74  SpO2:  [99 %] 99 %  No intake or output data in the 24 hours ending 09/26/24 0948    Lab Results: All Labs For Current Hospital Admission Reviewed    Current Facility-Administered Medications   Medication Dose Route Frequency Provider Last Rate    acetaminophen  650 mg Oral Q4H PRN Jordan C Holter, DO      acetaminophen  650 mg Oral Q6H PRN HOLLI Lion      aluminum-magnesium hydroxide-simethicone  30 mL Oral Q4H PRN Jordan C Holter, DO      amLODIPine  5 mg Oral Daily HOLLI Lion      ARIPiprazole  15 mg Oral Daily Bora Rosario MD      Artificial Tears  1 drop Both Eyes Q3H PRN Jordan C Holter, DO      atropine  1 drop Sublingual  Harjeet Torres,       haloperidol lactate  2.5 mg Intramuscular Q4H PRN Max 4/day HOLLI Lion      And    LORazepam  1 mg Intramuscular Q4H PRN Max  4/day STEVE LionNP      And    benztropine  0.5 mg Intramuscular Q4H PRN Max 4/day STEVE LionNP      benztropine  1 mg Intramuscular Q4H PRN Max 6/day Ion Dupontter, DO      haloperidol lactate  5 mg Intramuscular Q4H PRN Max 4/day HOLLI Lion      And    LORazepam  2 mg Intramuscular Q4H PRN Max 4/day HOLLI Lion      And    benztropine  1 mg Intramuscular Q4H PRN Max 4/day STEVE LionNP      benztropine  1 mg Oral Q4H PRN Max 6/day Eveline Hunt, CRJENNIE      benztropine  1 mg Oral Q4H PRN Max 6/day Jordan C Holter, DO      bisacodyl  10 mg Rectal Daily PRN HOLLI Lion      calcium carbonate  500 mg Oral BID PRN HOLLI Monge      cloZAPine  200 mg Oral Before Dinner Bora Rosario MD      cloZAPine  300 mg Oral HS Bora Rosario MD      cyanocobalamin  1,000 mcg Oral Daily HOLLI Galvan      hydrOXYzine HCL  50 mg Oral Q6H PRN Max 4/day Jordan C Holter, DO      Or    diphenhydrAMINE  50 mg Intramuscular Q6H PRN Ion Dupontter, DO      hydrOXYzine HCL  50 mg Oral Q6H PRN Max 4/day HOLLI Lion      Or    diphenhydrAMINE  50 mg Intramuscular Q6H PRN HOLLI Lion      diphenhydrAMINE-zinc acetate   Topical BID PRN HOLLI Lion      escitalopram  20 mg Oral Daily HOLLI Lion      famotidine  20 mg Oral BID HOLLI Monge      fluticasone  1 spray Each Nare Daily Brina Guillen MD      glycopyrrolate  1 mg Oral BID (AM & Afternoon) Bora Rosario MD      glycopyrrolate  2 mg Oral HS Bora Rosario MD      haloperidol  1 mg Oral Q6H PRN HOLLI Lion      haloperidol  2.5 mg Oral Q4H PRN Max 4/day HOLLI Lion      haloperidol  5 mg Oral Q4H PRN Max 4/day HOLLI Lion      hydroCHLOROthiazide  12.5 mg Oral Daily HOLLI Galvan      hydrocortisone   Topical 4x Daily PRN HOLLI Lion      hydrOXYzine HCL  100 mg Oral Q6H PRN Max 4/day Jordan C Holter, DO      Or    LORazepam  2 mg  Intramuscular Q6H PRN Jordan C Holter, DO      hydrOXYzine HCL  100 mg Oral Q6H PRN Max 4/day HOLLI Lion      Or    LORazepam  2 mg Intramuscular Q6H PRN HOLLI Lion      hydrOXYzine HCL  25 mg Oral Q6H PRN Max 4/day Ion AALIYAH Dupontter, DO      ibuprofen  600 mg Oral Q8H PRN HOLLI Lion      loratadine  10 mg Oral Daily Brina Guillen MD      melatonin  3 mg Oral HS PRN Bora Rosario MD      metFORMIN  500 mg Oral BID With Meals HOLLI Galvan      methocarbamol  500 mg Oral Q6H PRN HOLLI Lion      nicotine polacrilex  2 mg Oral Q4H PRN Bora Rosario MD      OLANZapine  5 mg Oral Q4H PRN Max 3/day [facility] Cresencioter, DO      Or    OLANZapine  2.5 mg Intramuscular Q4H PRN Max 3/day [facility] Cresencioter, DO      OLANZapine  5 mg Oral Q3H PRN Max 3/day [facility] Cresencioter, DO      Or    OLANZapine  5 mg Intramuscular Q3H PRN Max 3/day [facility] Cresencioter, DO      OLANZapine  2.5 mg Oral Q4H PRN Max 6/day [facility] Holter, DO      ondansetron  4 mg Oral Q6H PRN HOLLI Lion      pantoprazole  20 mg Oral QAM Ion  Holter, DO      polyethylene glycol  17 g Oral Daily PRN HOLLI Ghotra      propranolol  10 mg Oral Q12H KAYLIE HOLLI Lion      senna-docusate sodium  1 tablet Oral Daily PRN Jordan C Holter,       senna-docusate sodium  2 tablet Oral HS Melvina Ambron, DO      traZODone  50 mg Oral HS PRN HOLLI Lion      white petrolatum-mineral oil   Topical TID PRN HOLLI Lion         Counseling / Coordination of Care: Total floor / unit time spent today 15 minutes. Greater than 50% of total time was spent with the patient and / or family counseling and / or somewhat receptive to supportive listening and teaching positive coping skills to deal with symptom mangement.     Patient's Rights, confidentiality and exceptions to confidentiality, use of automated medical record, [facility] staff access to medical record, and consent to treatment reviewed.    This  note has been dictated and hence there may be problems with punctuation, spelling and formatting and if anyone has any concerns please address them to the writer.  This note is not shared with patient due to potential for making patient's condition worse by knowing the content of the note.    Eveline WALLACE

## 2024-09-26 NOTE — NURSING NOTE
Received pt in bed at change of shift with eyes closed; chest movement noted.  Continues the same thus this far as per q 7 min room checks.   Will continue to monitor behavior, sleeping pattern and any medical issues that may arise.    0541:  Sleeping 7+ hrs thus this far

## 2024-09-26 NOTE — PLAN OF CARE
Problem: Alteration in Thoughts and Perception  Goal: Verbalize thoughts and feelings  Description: Interventions:  - Promote a nonjudgmental and trusting relationship with the patient through active listening and therapeutic communication  - Assess patient's level of functioning, behavior and potential for risk  - Engage patient in 1 on 1 interactions  - Encourage patient to express fears, feelings, frustrations, and discuss symptoms    - [place] patient to reality, help patient recognize reality-based thinking   - Administer medications as ordered and assess for potential side effects  - Provide the patient education related to the signs and symptoms of the illness and desired effects of prescribed medications  Outcome: Progressing  Goal: Refrain from acting on delusional thinking/internal stimuli  Description: Interventions:  - Monitor patient closely, per order   - Utilize least restrictive measures   - Set reasonable limits, give positive feedback for acceptable   - Administer medications as ordered and monitor of potential side effects  Outcome: Progressing  Goal: Agree to be compliant with medication regime, as prescribed and report medication side effects  Description: Interventions:  - Offer appropriate PRN medication and supervise ingestion; conduct AIMS, as needed   Outcome: Progressing  Goal: Attend and participate in unit activities, including therapeutic, recreational, and educational groups  Description: Interventions:  -Encourage Visitation and family involvement in care  Outcome: Progressing  Goal: Recognize dysfunctional thoughts, communicate reality-based thoughts at the time of discharge  Description: Interventions:  - Provide medication and psycho-education to assist patient in compliance and developing insight into his/her illness   Outcome: Progressing  Goal: Complete daily ADLs, including personal hygiene independently, as able  Description: Interventions:  - Observe, teach, and assist  patient with ADLS  - Monitor and promote a balance of rest/activity, with adequate nutrition and elimination   Outcome: Progressing     Problem: Ineffective Coping  Goal: Participates in unit activities  Description: Interventions:  - Provide therapeutic environment   - Provide required programming   - Redirect inappropriate behaviors   Outcome: Progressing  Goal: Patient/Family participate in treatment and DC plans  Description: Interventions:  - Provide therapeutic environment  Outcome: Progressing  Goal: Patient/Family verbalizes awareness of resources  Outcome: Progressing     Problem: Depression  Goal: Treatment Goal: Demonstrate behavioral control of depressive symptoms, verbalize feelings of improved mood/affect, and adopt new coping skills prior to discharge  Outcome: Progressing  Goal: Verbalize thoughts and feelings  Description: Interventions:  - Assess and re-assess patient's level of risk   - Engage patient in 1:1 interactions, daily, for a minimum of 15 minutes   - Encourage patient to express feelings, fears, frustrations, hopes   Outcome: Progressing  Goal: Refrain from harming self  Description: Interventions:  - Monitor patient closely, per order   - Supervise medication ingestion, monitor effects and side effects   Outcome: Progressing  Goal: Refrain from isolation  Description: Interventions:  - Develop a trusting relationship   - Encourage socialization   Outcome: Progressing  Goal: Refrain from self-neglect  Outcome: Progressing  Goal: Complete daily ADLs, including personal hygiene independently, as able  Description: Interventions:  - Observe, teach, and assist patient with ADLS  -  Monitor and promote a balance of rest/activity, with adequate nutrition and elimination   Outcome: Progressing     Problem: Anxiety  Goal: Anxiety is at manageable level  Description: Interventions:  - Assess and monitor patient's anxiety level.   - Monitor for signs and symptoms (heart palpitations, chest pain,  shortness of breath, headaches, nausea, feeling jumpy, restlessness, irritable, apprehensive).   - Collaborate with interdisciplinary team and initiate plan and interventions as ordered.  - [place] patient to unit/surroundings  - Explain treatment plan  - Encourage participation in care  - Encourage verbalization of concerns/fears  - Identify coping mechanisms  - Assist in developing anxiety-reducing skills  - Administer/offer alternative therapies  - Limit or eliminate stimulants  Outcome: Progressing     Problem: Alteration in Orientation  Goal: Treatment Goal: Demonstrate a reduction of confusion and improved orientation to person, place, time and/or situation upon discharge, according to optimum baseline/ability  Outcome: Progressing     Problem: DISCHARGE PLANNING - CARE MANAGEMENT  Goal: Discharge to post-acute care or home with appropriate resources  Description: INTERVENTIONS:  - Conduct assessment to determine patient/family and health care team treatment goals, and need for post-acute services based on payer coverage, community resources, and patient preferences, and barriers to discharge  - Address psychosocial, clinical, and financial barriers to discharge as identified in assessment in conjunction with the patient/family and health care team  - Arrange appropriate level of post-acute services according to patient's   needs and preference and payer coverage in collaboration with the physician and health care team  - Communicate with and update the patient/family, physician, and health care team regarding progress on the discharge plan  - Arrange appropriate transportation to post-acute venues  Outcome: Progressing     Problem: Alteration in Thoughts and Perception  Goal: Treatment Goal: Gain control of psychotic behaviors/thinking, reduce/eliminate presenting symptoms and demonstrate improved reality functioning upon discharge  Outcome: Not Progressing

## 2024-09-26 NOTE — SOCIAL WORK
"SW and pt met 1:1  Pt in bed reports that he feels \"fine\" and is just trying to stay out of his own head. SW encouraged pt to be up, moving, attending groups, around constructive people. Pt expressed understanding.   BRANDY notified no contact has been established with aunt still.   "

## 2024-09-26 NOTE — PROGRESS NOTES
09/26/24 0809   Team Meeting   Meeting Type Daily Rounds   Team Members Present   Team Members Present Physician;Nurse;;Other (Discipline and Name)   Patient/Family Present   Patient Present No   Patient's Family Present No     In attendance:  MD Guy Gamino, RN  Luisana Alvarado, [facility]  Carla Lux, JORDAN    Groups: 6/10    Pt due for labs tonight. Pt social with select peers; pleasant demeanor. No bx issues noted. Pt reports feeling safe in the hospital but fearful/nervous regarding potential discharge in the future. AH ongoing. [facility] wait list.

## 2024-09-27 PROCEDURE — 99232 SBSQ HOSP IP/OBS MODERATE 35: CPT | Performed by: PSYCHIATRY & NEUROLOGY

## 2024-09-27 RX ADMIN — ARIPIPRAZOLE 15 MG: 15 TABLET ORAL at 08:17

## 2024-09-27 RX ADMIN — PANTOPRAZOLE SODIUM 20 MG: 20 TABLET, DELAYED RELEASE ORAL at 08:16

## 2024-09-27 RX ADMIN — CLOZAPINE 200 MG: 200 TABLET ORAL at 17:24

## 2024-09-27 RX ADMIN — CLOZAPINE 300 MG: 200 TABLET ORAL at 21:06

## 2024-09-27 RX ADMIN — GLYCOPYRROLATE 1 MG: 1 TABLET ORAL at 08:16

## 2024-09-27 RX ADMIN — GLYCOPYRROLATE 2 MG: 1 TABLET ORAL at 21:05

## 2024-09-27 RX ADMIN — HYDROCHLOROTHIAZIDE 12.5 MG: 12.5 TABLET ORAL at 08:18

## 2024-09-27 RX ADMIN — PROPRANOLOL HYDROCHLORIDE 10 MG: 10 TABLET ORAL at 21:06

## 2024-09-27 RX ADMIN — NICOTINE POLACRILEX 2 MG: 2 GUM, CHEWING ORAL at 13:08

## 2024-09-27 RX ADMIN — GLYCOPYRROLATE 1 MG: 1 TABLET ORAL at 13:08

## 2024-09-27 RX ADMIN — ATROPINE SULFATE 1 DROP: 10 SOLUTION/ DROPS OPHTHALMIC at 21:07

## 2024-09-27 RX ADMIN — FAMOTIDINE 20 MG: 20 TABLET ORAL at 08:17

## 2024-09-27 RX ADMIN — METFORMIN HYDROCHLORIDE 500 MG: 500 TABLET, FILM COATED ORAL at 17:24

## 2024-09-27 RX ADMIN — METFORMIN HYDROCHLORIDE 500 MG: 500 TABLET, FILM COATED ORAL at 08:17

## 2024-09-27 RX ADMIN — PROPRANOLOL HYDROCHLORIDE 10 MG: 10 TABLET ORAL at 08:16

## 2024-09-27 RX ADMIN — AMLODIPINE BESYLATE 5 MG: 5 TABLET ORAL at 08:17

## 2024-09-27 RX ADMIN — FLUTICASONE PROPIONATE 1 SPRAY: 50 SPRAY, METERED NASAL at 08:17

## 2024-09-27 RX ADMIN — LORATADINE 10 MG: 10 TABLET ORAL at 08:16

## 2024-09-27 RX ADMIN — CYANOCOBALAMIN TAB 1000 MCG 1000 MCG: 1000 TAB at 08:17

## 2024-09-27 RX ADMIN — FAMOTIDINE 20 MG: 20 TABLET ORAL at 17:24

## 2024-09-27 RX ADMIN — NICOTINE POLACRILEX 2 MG: 2 GUM, CHEWING ORAL at 21:27

## 2024-09-27 RX ADMIN — NICOTINE POLACRILEX 2 MG: 2 GUM, CHEWING ORAL at 17:24

## 2024-09-27 RX ADMIN — NICOTINE POLACRILEX 2 MG: 2 GUM, CHEWING ORAL at 08:16

## 2024-09-27 RX ADMIN — SENNOSIDES AND DOCUSATE SODIUM 2 TABLET: 8.6; 5 TABLET ORAL at 21:06

## 2024-09-27 RX ADMIN — ESCITALOPRAM OXALATE 20 MG: 10 TABLET, FILM COATED ORAL at 08:16

## 2024-09-27 NOTE — PROGRESS NOTES
"Psychiatry Progress Note [facility]    Alberto Berumen 28 y.o. male MRN: [id number]  Unit/Bed#: [facility] 108-02 Encounter: [id number]  Code Status: Level 1 - Full Code    PCP: Joce Juan MD    Date of Admission:  3/29/2024 2008   Date of Service:  09/27/24    Patient Active Problem List   Diagnosis    GERD (gastroesophageal reflux disease)    Medical clearance for psychiatric admission    Schizoaffective disorder, bipolar type (HCC)    Tobacco abuse    T wave inversion in EKG    Syringoma    Chronic idiopathic constipation    Vitamin B 12 deficiency    Vitamin D deficiency    Confluent and reticulate papillomatosis    Class 2 obesity in adult    Primary hypertension    Elevated hemoglobin A1c    Bilateral lower extremity edema         Review of systems: No symptoms reported  Psychiatric Diagnosis: Schizoaffective Disorder, Bipolar Type     Assessment  Overall Status: Patient reported sleeping over 8 hours last night but denied feeling rested. He reported his mood is \"the same\" and that his depression symptoms are a 4.5-5/10.  He continues to hear the threatening auditory hallucinations but stated they are at baseline for him today.   Certification Statement: The patient will continue to require additional inpatient hospital stay due to continued psychotic and severe symptoms.      Assessment & Plan  Schizoaffective disorder, bipolar type (HCC)  Schizoaffective Disorder, Bipolar Type  Continues to have depression symptoms and frequent, threatening hallucinations.   Continues to have paranoid delusions with a blunted affect  Appears to be making small improvements with his symptoms    Continue aripiprazole 15 mg tablet daily  Continue clozapine 200 mg tablet daily before dinner  Continue clozapine 300 gm tablet daily before bed  Continue cyanocobalamin 1000 mcg tablet daily  Continue escitalopram 20 mg tablet daily  Continue senna-docusate sodium two 50 mg tablets daily before bed  Tobacco " abuse    GERD (gastroesophageal reflux disease)  Continue famotidine 20 mg tablet BID per medical team  Continue glycopyrrolate 1 mg tablet BID AM and afternoon per medical team  Continue glycopyrrolate 2 mg tablet before bed per medical team  Continue pantoprazole EC 20 mg tablet every morning per medical team  Primary hypertension  Continue amlodipine 5 mg tablet daily per medical team   Continue hydrochlorothiazide 12.5 mg tablet daily per medical team  Elevated hemoglobin A1c  Continue metformin 500 mg tablet BID with meals per medical team  Chronic idiopathic constipation  Continue senna-docusate sodium two 50 mg tablets before bed per medical team  Class 2 obesity in adult           Medications:   Abilify 15 mg po daily, clozaril 200 mg po at dinner and 300 mg po HS, B12 1000 mg po daily, lexapro 20 mg po daily, glycopyrrolate 1 mg BID, and 2 mg HS, propranolol 10 mg po BID, senna docusate sodium 2 tablets po HS.   Side effects from treatment: Reported continued drooling that has improved since receiving the atropine drops   Medication changes   None   Medication education   Risks side effects benefits and precautions of medications discussed with patient and he did verbalize an understanding about risks for metabolic syndrome from being on neuroleptics and is form tardive dyskinesia etc.  All medications reviewed and I recommend that they be continued for symptom management   Understanding of medications: Verbalized    Justification for dual anti-psychotics: Inability to control psychotic symptoms.     Non-pharmacological treatments  Continue with individual, group, milieu and occupational therapy using recovery principles and psycho-education about accepting illness and the need for treatment.    Safety  Safety and communication plan established to target dynamic risk factors discussed above.    Discharge Plan   Aunt's house vs group home    Interval Progress   Patient reported that he slept for  "approximately 8 hours but does not feel rested this morning. He stated he feels more tired than usual today but that some days he is able to feel rested when he wakes up in the morning. He reported drooling a little bit last night but confirms he gets his atropine drops every night. He reported his appetite remains good and his mood is \"the same\". He rated his depression symptoms a 4.5-5/10 today and denied any anxiety. He states his depression is largely due to the voices he hears. He continues to hear the threatening auditory hallucinations but reported they are at baseline today. He denied any suicidal or homicidal thoughts, plans, or intents. He mentioned feeling annoyed that his aunt has not been answering his phone calls and feels she is \"dodging him\". He reported going to groups yesterday and confirms he will attend more today.   Acceptance by patient: Yes   Hopefulness in recovery: Hoping to live with aunt   Involved in reintegration process: Aunt   Trusting in relationship with psychiatrist: Yes  Sleep: Good, approximately 8 hours  Appetite: Good, 100%/75%/100%  Compliance with Medications: Good  Group attendance: 5/10  Significant events: None reported      Mental Status Exam  Appearance: age appropriate, overweight, wearing personal sweatshirt with restrepo up and hospital scrub pants  Behavior: cooperative, calm  Speech: normal rate, normal pitch, scant, soft  Mood: depressed  Affect:  depressed, flat, mood congruent  Thought Process: organized, linear  Thought Content: paranoid ideation, stating that he thinks the voices in his head are out to get him. He states that he feels safe in the hospital despite threatening voices in his head. He denied any suicidal or homicidal thoughts, plans, or intents. No obsessions, compulsions, or distorted body perceptions verbalized. He denied any other experiences that would be categorized as grandiose, somatic, or bizarre delusions.    Perceptual Disturbances: auditory " hallucinations that tell him they want to shoot him and his family when he leaves the hospital. He continues to endorse that they come and go but that he hears them everyday. Today they are at baseline. Continues to state that they either sound like strangers or his ex girlfriend. , denies visual hallucinations when asked, does not appear responding to internal stimuli  Hx Risk Factors: chronic psychiatric problems, chronic depressive symptoms, chronic psychotic symptoms  Sensorium: alert and oriented to person, place, time and situation  Cognition: recent and remote memory grossly intact  Consciousness: alert and awake  Attention: attention span and concentration are age appropriate  Intellect: appears to be of average intelligence  Insight: limited  Judgement: impaired  Motor Activity: no abnormal movements     Vitals  Temp:  [97.5 °F (36.4 °C)-97.8 °F (36.6 °C)] 97.8 °F (36.6 °C)  HR:  [] 99  Resp:  [18] 18  BP: (116-126)/(78-87) 121/87  SpO2:  [98 %-99 %] 98 %  No intake or output data in the 24 hours ending 09/27/24 0919    Lab Results: All Labs For Current Hospital Admission Reviewed    Current Facility-Administered Medications   Medication Dose Route Frequency Provider Last Rate    acetaminophen  650 mg Oral Q4H PRN Jordan C Holter, DO      acetaminophen  650 mg Oral Q6H PRN HOLLI Lion      aluminum-magnesium hydroxide-simethicone  30 mL Oral Q4H PRN Jordan C Holter, DO      amLODIPine  5 mg Oral Daily HOLLI Lion      ARIPiprazole  15 mg Oral Daily Bora Rosario MD      Artificial Tears  1 drop Both Eyes Q3H PRN Jordan C Holter, DO      atropine  1 drop Sublingual  Harjeet Torres, DO      haloperidol lactate  2.5 mg Intramuscular Q4H PRN Max 4/day HOLLI Lion      And    LORazepam  1 mg Intramuscular Q4H PRN Max 4/day HOLLI Lion      And    benztropine  0.5 mg Intramuscular Q4H PRN Max 4/day HOLLI Lion      benztropine  1 mg Intramuscular Q4H PRN Max 6/day  Ion Dupontter, DO      haloperidol lactate  5 mg Intramuscular Q4H PRN Max 4/day Eveline Hunt CRNP      And    LORazepam  2 mg Intramuscular Q4H PRN Max 4/day Eveline Hunt, CRNP      And    benztropine  1 mg Intramuscular Q4H PRN Max 4/day Evelinevipul Hunt, CRNP      benztropine  1 mg Oral Q4H PRN Max 6/day Eveline Hunt, CRNP      benztropine  1 mg Oral Q4H PRN Max 6/day Jordan C Holter, DO      bisacodyl  10 mg Rectal Daily PRN Eveline Hunt, CRNP      calcium carbonate  500 mg Oral BID PRN HOLLI Monge      cloZAPine  200 mg Oral Before Dinner Bora Rosario MD      cloZAPine  300 mg Oral HS Bora Rosario MD      cyanocobalamin  1,000 mcg Oral Daily Pia Mantilla, HOLLI      hydrOXYzine HCL  50 mg Oral Q6H PRN Max 4/day Jordan C Holter, DO      Or    diphenhydrAMINE  50 mg Intramuscular Q6H PRN Jordan C Holter, DO      hydrOXYzine HCL  50 mg Oral Q6H PRN Max 4/day HOLLI Lion      Or    diphenhydrAMINE  50 mg Intramuscular Q6H PRN HOLLI Lion      diphenhydrAMINE-zinc acetate   Topical BID PRN Eveline Hunt, HOLLI      escitalopram  20 mg Oral Daily Eveline Hunt, CRNP      famotidine  20 mg Oral BID HOLLI Monge      fluticasone  1 spray Each Nare Daily Brina Guillen MD      glycopyrrolate  1 mg Oral BID (AM & Afternoon) Bora Rosario MD      glycopyrrolate  2 mg Oral HS Bora Rosario MD      haloperidol  1 mg Oral Q6H PRN Eveline Hunt, HOLLI      haloperidol  2.5 mg Oral Q4H PRN Max 4/day Eveline Hunt, HOLLI      haloperidol  5 mg Oral Q4H PRN Max 4/day Eveline Hunt, CRJENNIE      hydroCHLOROthiazide  12.5 mg Oral Daily Pia Mantilla, HOLLI      hydrocortisone   Topical 4x Daily PRN Eveline Hunt, CRNP      hydrOXYzine HCL  100 mg Oral Q6H PRN Max 4/day Jordan C Holter, DO      Or    LORazepam  2 mg Intramuscular Q6H PRN Jordan C Holter, DO      hydrOXYzine HCL  100 mg Oral Q6H PRN Max 4/day HOLLI Lion      Or    LORazepam  2 mg Intramuscular Q6H PRN Eveline  HOLLI Hunt      hydrOXYzine HCL  25 mg Oral Q6H PRN Max 4/day [facility] Holter, DO      ibuprofen  600 mg Oral Q8H PRN HOLLI Lion      loratadine  10 mg Oral Daily Brina Guillen MD      melatonin  3 mg Oral HS PRN Bora Rosario MD      metFORMIN  500 mg Oral BID With Meals HOLLI Galvan      methocarbamol  500 mg Oral Q6H PRN HOLLI Lion      nicotine polacrilex  2 mg Oral Q4H PRN Bora Rosario MD      OLANZapine  5 mg Oral Q4H PRN Max 3/day [facility] Holter, DO      Or    OLANZapine  2.5 mg Intramuscular Q4H PRN Max 3/day [facility] Holter, DO      OLANZapine  5 mg Oral Q3H PRN Max 3/day [facility] Holter, DO      Or    OLANZapine  5 mg Intramuscular Q3H PRN Max 3/day [facility] Holter, DO      OLANZapine  2.5 mg Oral Q4H PRN Max 6/day [facility] Holter, DO      ondansetron  4 mg Oral Q6H PRN HOLLI Lion      pantoprazole  20 mg Oral QAM [facility] Holter, DO      polyethylene glycol  17 g Oral Daily PRN HOLLI Ghotra      propranolol  10 mg Oral Q12H KAYLIE HOLLI Lion      senna-docusate sodium  1 tablet Oral Daily PRN [facility] Holter, DO      senna-docusate sodium  2 tablet Oral HS Melvina Ambron, DO      traZODone  50 mg Oral HS PRN HOLLI Lion      white petrolatum-mineral oil   Topical TID PRN HOLLI Lion         Counseling / Coordination of Care: Total floor / unit time spent today 15 minutes. Greater than 50% of total time was spent with the patient and / or family counseling and / or somewhat receptive to supportive listening and teaching positive coping skills to deal with symptom mangement.     Patient's Rights, confidentiality and exceptions to confidentiality, use of automated medical record, Behavioral Health Services staff access to medical record, and consent to treatment reviewed.    This note has been dictated and hence there may be problems with punctuation, spelling and formatting and if anyone has any concerns please address them to the  writer.  This note is not shared with patient due to potential for making patient's condition worse by knowing the content of the note.    Eveline WALLACE

## 2024-09-27 NOTE — SOCIAL WORK
SW checked in with pt. Pt reports he has been trying to call his aunt but has not been able to connect with her. SW encouraged continued attempts and notified pt that a letter was sent.   SW checked in regarding mood/affect as pt seemed flat/blunted/depressive and possibly sad. Pt reports feeling ok just tired, likely from the dreary weather. SW and pt processed recent feelings/thoughts regarding discharge. Pt identified he does not feel ready to discharge right this moment but also does not want to be waiting for months in order to leave.

## 2024-09-27 NOTE — NURSING NOTE
Pt in bed asleep, eyes closed, chest movement noted. Continuous safety checks maintained. No unmet needs at this time. Will continue to monitor patient needs, sleep pattern, and behaviors.     0626: Patient has slept all night, 7+ hours of uninterrupted sleep

## 2024-09-27 NOTE — PROGRESS NOTES
09/27/24 0741   Team Meeting   Meeting Type Daily Rounds   Team Members Present   Team Members Present Physician;Nurse;;Other (Discipline and Name)   Patient/Family Present   Patient Present No   Patient's Family Present No     In attendance:  Dr. Alex Thomas, MD Dr. Jordan Holter, DO Guy Taylor, RN  Luisana Alvarado, [facility]  Carla Lux, [facility]    Groups: 5/10    Pt Clozaril level drawn. Pt and tx team continue trying to contact his aunt. Pt reports ongoing AH. Pt denies depression/anxiety. Cooperative and appropriate.

## 2024-09-27 NOTE — NURSING NOTE
Pt is visible on the unit and social with select peers. Took medications without incidence. Pt is pleasant and cooperative. Attended 5/10 groups. Reports AH that threaten to kill him and his family, denies SI/HI/VH. No behavioral issues. No unmet needs at this time. Continuous safety checks maintained.

## 2024-09-27 NOTE — PROGRESS NOTES
09/27/24 1400 09/27/24 1600   Activity/Group Checklist   Group Other (Comment)  (Mindfulness coping) Community meeting   Attendance Attended Attended   Attendance Duration (min) 31-45 31-45   Interactions Interacted appropriately Interacted appropriately   Affect/Mood Appropriate Appropriate   Goals Achieved Able to engage in interactions;Able to listen to others;Able to self-disclose;Able to recieve feedback;Able to give feedback to another Able to engage in interactions;Able to listen to others;Able to self-disclose;Able to recieve feedback;Able to give feedback to another

## 2024-09-27 NOTE — PLAN OF CARE
Problem: Alteration in Thoughts and Perception  Goal: Verbalize thoughts and feelings  Description: Interventions:  - Promote a nonjudgmental and trusting relationship with the patient through active listening and therapeutic communication  - Assess patient's level of functioning, behavior and potential for risk  - Engage patient in 1 on 1 interactions  - Encourage patient to express fears, feelings, frustrations, and discuss symptoms    - [place] patient to reality, help patient recognize reality-based thinking   - Administer medications as ordered and assess for potential side effects  - Provide the patient education related to the signs and symptoms of the illness and desired effects of prescribed medications  Outcome: Progressing  Goal: Refrain from acting on delusional thinking/internal stimuli  Description: Interventions:  - Monitor patient closely, per order   - Utilize least restrictive measures   - Set reasonable limits, give positive feedback for acceptable   - Administer medications as ordered and monitor of potential side effects  Outcome: Progressing  Goal: Agree to be compliant with medication regime, as prescribed and report medication side effects  Description: Interventions:  - Offer appropriate PRN medication and supervise ingestion; conduct AIMS, as needed   Outcome: Progressing  Goal: Complete daily ADLs, including personal hygiene independently, as able  Description: Interventions:  - Observe, teach, and assist patient with ADLS  - Monitor and promote a balance of rest/activity, with adequate nutrition and elimination   Outcome: Progressing     Problem: Ineffective Coping  Goal: Identifies ineffective coping skills  Outcome: Progressing  Goal: Identifies healthy coping skills  Outcome: Progressing  Goal: Demonstrates healthy coping skills  Outcome: Progressing     Problem: Depression  Goal: Verbalize thoughts and feelings  Description: Interventions:  - Assess and re-assess patient's level of  risk   - Engage patient in 1:1 interactions, daily, for a minimum of 15 minutes   - Encourage patient to express feelings, fears, frustrations, hopes   Outcome: Progressing  Goal: Refrain from harming self  Description: Interventions:  - Monitor patient closely, per order   - Supervise medication ingestion, monitor effects and side effects   Outcome: Progressing  Goal: Refrain from isolation  Description: Interventions:  - Develop a trusting relationship   - Encourage socialization   Outcome: Progressing  Goal: Refrain from self-neglect  Outcome: Progressing     Problem: Anxiety  Goal: Anxiety is at manageable level  Description: Interventions:  - Assess and monitor patient's anxiety level.   - Monitor for signs and symptoms (heart palpitations, chest pain, shortness of breath, headaches, nausea, feeling jumpy, restlessness, irritable, apprehensive).   - Collaborate with interdisciplinary team and initiate plan and interventions as ordered.  - [place] patient to unit/surroundings  - Explain treatment plan  - Encourage participation in care  - Encourage verbalization of concerns/fears  - Identify coping mechanisms  - Assist in developing anxiety-reducing skills  - Administer/offer alternative therapies  - Limit or eliminate stimulants  Outcome: Progressing

## 2024-09-28 PROCEDURE — 99232 SBSQ HOSP IP/OBS MODERATE 35: CPT

## 2024-09-28 RX ADMIN — GLYCOPYRROLATE 1 MG: 1 TABLET ORAL at 08:32

## 2024-09-28 RX ADMIN — LORATADINE 10 MG: 10 TABLET ORAL at 08:32

## 2024-09-28 RX ADMIN — PROPRANOLOL HYDROCHLORIDE 10 MG: 10 TABLET ORAL at 08:32

## 2024-09-28 RX ADMIN — PANTOPRAZOLE SODIUM 20 MG: 20 TABLET, DELAYED RELEASE ORAL at 08:32

## 2024-09-28 RX ADMIN — FAMOTIDINE 20 MG: 20 TABLET ORAL at 17:09

## 2024-09-28 RX ADMIN — NICOTINE POLACRILEX 2 MG: 2 GUM, CHEWING ORAL at 17:00

## 2024-09-28 RX ADMIN — AMLODIPINE BESYLATE 5 MG: 5 TABLET ORAL at 08:32

## 2024-09-28 RX ADMIN — PROPRANOLOL HYDROCHLORIDE 10 MG: 10 TABLET ORAL at 21:26

## 2024-09-28 RX ADMIN — HYDROCHLOROTHIAZIDE 12.5 MG: 12.5 TABLET ORAL at 08:32

## 2024-09-28 RX ADMIN — FLUTICASONE PROPIONATE 1 SPRAY: 50 SPRAY, METERED NASAL at 08:36

## 2024-09-28 RX ADMIN — CLOZAPINE 200 MG: 200 TABLET ORAL at 15:02

## 2024-09-28 RX ADMIN — CLOZAPINE 300 MG: 200 TABLET ORAL at 21:27

## 2024-09-28 RX ADMIN — ESCITALOPRAM OXALATE 20 MG: 10 TABLET, FILM COATED ORAL at 08:32

## 2024-09-28 RX ADMIN — NICOTINE POLACRILEX 2 MG: 2 GUM, CHEWING ORAL at 08:37

## 2024-09-28 RX ADMIN — ARIPIPRAZOLE 15 MG: 15 TABLET ORAL at 08:32

## 2024-09-28 RX ADMIN — FAMOTIDINE 20 MG: 20 TABLET ORAL at 08:32

## 2024-09-28 RX ADMIN — GLYCOPYRROLATE 2 MG: 1 TABLET ORAL at 21:26

## 2024-09-28 RX ADMIN — CYANOCOBALAMIN TAB 1000 MCG 1000 MCG: 1000 TAB at 08:32

## 2024-09-28 RX ADMIN — GLYCOPYRROLATE 1 MG: 1 TABLET ORAL at 15:01

## 2024-09-28 RX ADMIN — NICOTINE POLACRILEX 2 MG: 2 GUM, CHEWING ORAL at 21:26

## 2024-09-28 RX ADMIN — METFORMIN HYDROCHLORIDE 500 MG: 500 TABLET, FILM COATED ORAL at 17:09

## 2024-09-28 RX ADMIN — METFORMIN HYDROCHLORIDE 500 MG: 500 TABLET, FILM COATED ORAL at 08:32

## 2024-09-28 RX ADMIN — SENNOSIDES AND DOCUSATE SODIUM 2 TABLET: 8.6; 5 TABLET ORAL at 21:26

## 2024-09-28 NOTE — NURSING NOTE
Alberto reported feeling depressed and anxious today.  Reported that he heard voices today.  They are still telling him that they are going to kill him and his family.    Pt was visible, social, pleasant and cooperative.  Meal and med compliant, consumed 100% of dinner.  Requested gum throughout the shift.  No unmet needs.  No behavioral issues noted or reported.

## 2024-09-28 NOTE — NURSING NOTE
Pt is calm and cooperative with care, visible on the unit. Pt is social with peers and staff. Pt reports feeling a little depressed, reports AH voices saying they are going to kill him and his family. Denies SI/HI/VH. Pt is meal and medication complaint, safety checks ongoing.

## 2024-09-28 NOTE — NURSING NOTE
Pt is calm and cooperative, visible on the unit, social with peers and staff. Pt ate 75% of dinner. Pt is medication compliant, safety checks ongoing.

## 2024-09-28 NOTE — NURSING NOTE
Alberto has been asleep. Respirations easy and non labored. No issues or behaviors. Continue to monitor. Precautions maintained.

## 2024-09-28 NOTE — NURSING NOTE
Resting in bed , no behaviors, will monitor for change in behavior/assessment, with every 7 min checks made thru the night,.

## 2024-09-28 NOTE — PROGRESS NOTES
Progress Note - Behavioral Health   Name: Alberto Berumen 28 y.o. male I MRN: [id number]  Unit/Bed#: [facility] 108-02 I Date of Admission: 3/29/2024   Date of Service: 9/28/2024 I Hospital Day: 183        Assessment & Plan  Schizoaffective disorder, bipolar type (HCC)  Schizoaffective Disorder, Bipolar Type  Ongoing depressive symptoms with c/o AH of threatening voices..   Continues to have paranoid delusions.  Status of diagnosis: slowly improving  Continue CRP, BNP, CBC with diff every 2 weeks, and weekly clozapine and CK in setting of Clozaril use (9/19/2024 CBC ANC 5.72). Clozaril level 9/26/24: 944 (pt asymptomatic)    Continue aripiprazole 15 mg tablet daily  Continue clozapine 200 mg tablet daily before dinner  Continue clozapine 300 gm tablet daily at bedtime  Continue cyanocobalamin 1000 mcg tablet daily  Continue escitalopram 20 mg tablet daily  Continue senna-docusate sodium two 50 mg tablets daily before bed  Chronic idiopathic constipation  Continue senna-docusate sodium two 50 mg tablets before bed per medical team      Planned medication and treatment changes:    All current active medications have been reviewed  Encourage group therapy, milieu therapy and occupational therapy  Behavioral Health checks every 7 minutes  Continue with SLIM medical management as indicated  Disposition planning ongoing      Continue current medications:     Current medications:  Current Facility-Administered Medications   Medication Dose Route Frequency Provider Last Rate    acetaminophen  650 mg Oral Q4H PRN Jordan C Holter, DO      acetaminophen  650 mg Oral Q6H PRN HOLLI Lion      aluminum-magnesium hydroxide-simethicone  30 mL Oral Q4H PRN Jordan C Holter, DO      amLODIPine  5 mg Oral Daily HOLLI Lion      ARIPiprazole  15 mg Oral Daily Bora Rosario MD      Artificial Tears  1 drop Both Eyes Q3H PRN Jordan C Holter, DO      atropine  1 drop Sublingual HS Harjeet Torres DO      haloperidol lactate  2.5  mg Intramuscular Q4H PRN Max 4/day Eveline Hunt, CRNP      And    LORazepam  1 mg Intramuscular Q4H PRN Max 4/day Eveline Gilbert, CRNP      And    benztropine  0.5 mg Intramuscular Q4H PRN Max 4/day Eveline Gilbert, CRNP      benztropine  1 mg Intramuscular Q4H PRN Max 6/day Ion FISCHER Holter, DO      haloperidol lactate  5 mg Intramuscular Q4H PRN Max 4/day Eveline Hunt, CRNP      And    LORazepam  2 mg Intramuscular Q4H PRN Max 4/day Eveline Gilbert, CRNP      And    benztropine  1 mg Intramuscular Q4H PRN Max 4/day Eveline Gilbert, CRNP      benztropine  1 mg Oral Q4H PRN Max 6/day Eveline Gilbert, CRJENNIE      benztropine  1 mg Oral Q4H PRN Max 6/day Jordan C Holter, DO      bisacodyl  10 mg Rectal Daily PRN Evelinevipul Hunt, CRNP      calcium carbonate  500 mg Oral BID PRN HOLLI Monge      cloZAPine  200 mg Oral Before Dinner Bora Rosario MD      cloZAPine  300 mg Oral HS Bora Rosario MD      cyanocobalamin  1,000 mcg Oral Daily HOLLI Galvan      hydrOXYzine HCL  50 mg Oral Q6H PRN Max 4/day Jordan C Holter, DO      Or    diphenhydrAMINE  50 mg Intramuscular Q6H PRN Jordan C Holter, DO      hydrOXYzine HCL  50 mg Oral Q6H PRN Max 4/day HOLLI Lion      Or    diphenhydrAMINE  50 mg Intramuscular Q6H PRN HOLLI Lion      diphenhydrAMINE-zinc acetate   Topical BID PRN Eveline Hunt, CRNP      escitalopram  20 mg Oral Daily Eveline Hunt, CRJENNIE      famotidine  20 mg Oral BID HOLLI Monge      fluticasone  1 spray Each Nare Daily Brina Guillen MD      glycopyrrolate  1 mg Oral BID (AM & Afternoon) Bora Rosario MD      glycopyrrolate  2 mg Oral HS Bora Rosario MD      haloperidol  1 mg Oral Q6H PRN Eveline Hunt, HOLLI      haloperidol  2.5 mg Oral Q4H PRN Max 4/day HOLLI Lion      haloperidol  5 mg Oral Q4H PRN Max 4/day HOLLI Lion      hydroCHLOROthiazide  12.5 mg Oral Daily HOLLI Galvan      hydrocortisone   Topical 4x Daily PRN HOLLI Lion       hydrOXYzine HCL  100 mg Oral Q6H PRN Max 4/day Ion C Holter, DO      Or    LORazepam  2 mg Intramuscular Q6H PRN Ion  Holter, DO      hydrOXYzine HCL  100 mg Oral Q6H PRN Max 4/day HOLLI Lion      Or    LORazepam  2 mg Intramuscular Q6H PRN HOLLI Lion      hydrOXYzine HCL  25 mg Oral Q6H PRN Max 4/day [facility] Cresencioter, DO      ibuprofen  600 mg Oral Q8H PRN HOLLI Lion      loratadine  10 mg Oral Daily Brina Guillen MD      melatonin  3 mg Oral HS PRN Bora Rosario MD      metFORMIN  500 mg Oral BID With Meals HOLLI Galvan      methocarbamol  500 mg Oral Q6H PRN HOLLI Lion      nicotine polacrilex  2 mg Oral Q4H PRN Bora Rosario MD      OLANZapine  5 mg Oral Q4H PRN Max 3/day [facility] Cresencioter, DO      Or    OLANZapine  2.5 mg Intramuscular Q4H PRN Max 3/day [facility] Cresencioter, DO      OLANZapine  5 mg Oral Q3H PRN Max 3/day [facility] Cresencioter, DO      Or    OLANZapine  5 mg Intramuscular Q3H PRN Max 3/day [facility] Cresencioter, DO      OLANZapine  2.5 mg Oral Q4H PRN Max 6/day [facility] Holter, DO      ondansetron  4 mg Oral Q6H PRN HOLLI Lion      pantoprazole  20 mg Oral QAM [facility] Cresencioter, DO      polyethylene glycol  17 g Oral Daily PRN HOLLI Ghotra      propranolol  10 mg Oral Q12H KAYLIE HOLLI Lion      senna-docusate sodium  1 tablet Oral Daily PRN Jordan C Holter,       senna-docusate sodium  2 tablet Oral HS Melvina Ambron, DO      traZODone  50 mg Oral HS PRN HOLLI Lion      white petrolatum-mineral oil   Topical TID PRN HOLLI Lion         Risks / Benefits of Treatment:    Risks, benefits, and possible side effects of medications explained to patient and patient verbalizes understanding and agreement for treatment.    Subjective:    Behavior over the last 24 hours: unchanged.     Pt appears mildly depressed and is sitting by self on unit.  He reports ongoing depression symptoms and denies SI/HI with plan or intent. He reports  ongoing AH of threatening voices.  He denies VH.  Pt does not appear to be overtly responding to IS. Some paranoid delusions due to AV although he is pleasant and does not appear suspicious of staff.  He is social with select peers.  He reports he is eating and sleeping well.     As per nursing, he continue to report AH of voices.  He has been pleasant and participated in milieu.  No behavioral concerns reported.      Sleep: normal  Appetite: normal  Medication side effects: No.   ROS: no complaints    Mental Status Evaluation:    Appearance:  age appropriate, casually dressed   Behavior:  pleasant, cooperative   Speech:  normal rate, normal volume, normal pitch   Mood:  mildly depressed   Affect:  blunted   Thought Process:  coherent, goal directed   Associations: concrete associations   Thought Content:  some paranoia   Perceptual Disturbances: does not appear responding to internal stimuli, denies visual hallucinations when asked, auditory hallucinations of threatening voices    Risk Potential: Suicidal ideation - None at present  Homicidal ideation - None at present  Potential for aggression - No   Sensorium:  oriented to person, place, and time/date   Memory:  recent and remote memory grossly intact   Consciousness:  alert and awake   Attention/Concentration: attention span and concentration are age appropriate   Insight:  limited   Judgment: limited   Gait/Station: normal gait/station   Motor Activity: no abnormal movements     Vital signs in last 24 hours:    Temp:  [97.7 °F (36.5 °C)-97.8 °F (36.6 °C)] 97.8 °F (36.6 °C)  HR:  [] 99  Resp:  [16-18] 16  BP: (115-149)/(74-98) 131/74         Laboratory results: I have personally reviewed all pertinent laboratory/tests results    Results from the past 24 hours: No results found for this or any previous visit (from the past 24 hour(s)).    Progress Toward Goals: no significant improvement today    Counseling / Coordination of Care:    Total floor / unit time  spent today 15 minutes. Greater than 50% of total time was spent with the patient and / or family counseling and / or coordination of care. A description of counseling / coordination of care:  Patient's progress reviewed with nursing staff.  Reassurance and supportive therapy provided.  Encouraged participation in milieu and group therapy on the unit.      HOLLI Torres 09/28/24

## 2024-09-29 VITALS
HEIGHT: 66 IN | SYSTOLIC BLOOD PRESSURE: 120 MMHG | OXYGEN SATURATION: 97 % | TEMPERATURE: 97.5 F | RESPIRATION RATE: 18 BRPM | HEART RATE: 99 BPM | DIASTOLIC BLOOD PRESSURE: 90 MMHG | BODY MASS INDEX: 40.02 KG/M2 | WEIGHT: 249 LBS

## 2024-09-29 LAB
ANION GAP SERPL CALCULATED.3IONS-SCNC: 8 MMOL/L (ref 4–13)
BASOPHILS # BLD AUTO: 0.05 THOUSANDS/ÂΜL (ref 0–0.1)
BASOPHILS NFR BLD AUTO: 0 % (ref 0–1)
BUN SERPL-MCNC: 11 MG/DL (ref 5–25)
CALCIUM SERPL-MCNC: 10.3 MG/DL (ref 8.4–10.2)
CARDIAC TROPONIN I PNL SERPL HS: <2 NG/L (ref 8–18)
CHLORIDE SERPL-SCNC: 101 MMOL/L (ref 96–108)
CK SERPL-CCNC: 2278 U/L (ref 39–308)
CLOZAPINE SERPL-MCNC: 709 NG/ML (ref 350–900)
CO2 SERPL-SCNC: 31 MMOL/L (ref 21–32)
CREAT SERPL-MCNC: 1.08 MG/DL (ref 0.6–1.3)
EOSINOPHIL # BLD AUTO: 1.26 THOUSAND/ÂΜL (ref 0–0.61)
EOSINOPHIL NFR BLD AUTO: 11 % (ref 0–6)
ERYTHROCYTE [DISTWIDTH] IN BLOOD BY AUTOMATED COUNT: 14.6 % (ref 11.6–15.1)
GFR SERPL CREATININE-BSD FRML MDRD: 93 ML/MIN/1.73SQ M
GLUCOSE SERPL-MCNC: 126 MG/DL (ref 65–140)
HCT VFR BLD AUTO: 47.8 % (ref 36.5–49.3)
HGB BLD-MCNC: 14.1 G/DL (ref 12–17)
IMM GRANULOCYTES # BLD AUTO: 0.05 THOUSAND/UL (ref 0–0.2)
IMM GRANULOCYTES NFR BLD AUTO: 0 % (ref 0–2)
LYMPHOCYTES # BLD AUTO: 2.56 THOUSANDS/ÂΜL (ref 0.6–4.47)
LYMPHOCYTES NFR BLD AUTO: 21 % (ref 14–44)
MCH RBC QN AUTO: 23.5 PG (ref 26.8–34.3)
MCHC RBC AUTO-ENTMCNC: 29.5 G/DL (ref 31.4–37.4)
MCV RBC AUTO: 80 FL (ref 82–98)
MONOCYTES # BLD AUTO: 0.83 THOUSAND/ÂΜL (ref 0.17–1.22)
MONOCYTES NFR BLD AUTO: 7 % (ref 4–12)
NEUTROPHILS # BLD AUTO: 7.25 THOUSANDS/ÂΜL (ref 1.85–7.62)
NEUTS SEG NFR BLD AUTO: 61 % (ref 43–75)
NRBC BLD AUTO-RTO: 0 /100 WBCS
PLATELET # BLD AUTO: 293 THOUSANDS/UL (ref 149–390)
PMV BLD AUTO: 10.5 FL (ref 8.9–12.7)
POTASSIUM SERPL-SCNC: 3.9 MMOL/L (ref 3.5–5.3)
RBC # BLD AUTO: 6 MILLION/UL (ref 3.88–5.62)
SODIUM SERPL-SCNC: 140 MMOL/L (ref 135–147)
WBC # BLD AUTO: 12 THOUSAND/UL (ref 4.31–10.16)

## 2024-09-29 PROCEDURE — 82550 ASSAY OF CK (CPK): CPT

## 2024-09-29 PROCEDURE — NC001 PR NO CHARGE: Performed by: PSYCHIATRY & NEUROLOGY

## 2024-09-29 PROCEDURE — 84484 ASSAY OF TROPONIN QUANT: CPT

## 2024-09-29 PROCEDURE — 80048 BASIC METABOLIC PNL TOTAL CA: CPT

## 2024-09-29 PROCEDURE — 93005 ELECTROCARDIOGRAM TRACING: CPT

## 2024-09-29 PROCEDURE — 85025 COMPLETE CBC W/AUTO DIFF WBC: CPT

## 2024-09-29 PROCEDURE — 80159 DRUG ASSAY CLOZAPINE: CPT

## 2024-09-29 PROCEDURE — 99232 SBSQ HOSP IP/OBS MODERATE 35: CPT | Performed by: PSYCHIATRY & NEUROLOGY

## 2024-09-29 RX ORDER — CLOZAPINE 100 MG/1
100 TABLET ORAL
Status: DISCONTINUED | OUTPATIENT
Start: 2024-09-30 | End: 2024-09-29

## 2024-09-29 RX ORDER — CLOZAPINE 200 MG/1
200 TABLET ORAL
Status: DISCONTINUED | OUTPATIENT
Start: 2024-09-29 | End: 2024-09-29

## 2024-09-29 RX ORDER — CLOZAPINE 200 MG/1
200 TABLET ORAL
Status: DISCONTINUED | OUTPATIENT
Start: 2024-09-30 | End: 2024-09-30

## 2024-09-29 RX ORDER — SODIUM CHLORIDE 9 MG/ML
150 INJECTION, SOLUTION INTRAVENOUS CONTINUOUS
Status: DISCONTINUED | OUTPATIENT
Start: 2024-09-29 | End: 2024-09-29

## 2024-09-29 RX ADMIN — PROPRANOLOL HYDROCHLORIDE 10 MG: 10 TABLET ORAL at 21:19

## 2024-09-29 RX ADMIN — GLYCOPYRROLATE 1 MG: 1 TABLET ORAL at 14:21

## 2024-09-29 RX ADMIN — METFORMIN HYDROCHLORIDE 500 MG: 500 TABLET, FILM COATED ORAL at 17:25

## 2024-09-29 RX ADMIN — GLYCOPYRROLATE 2 MG: 1 TABLET ORAL at 21:32

## 2024-09-29 RX ADMIN — PROPRANOLOL HYDROCHLORIDE 10 MG: 10 TABLET ORAL at 08:41

## 2024-09-29 RX ADMIN — GLYCOPYRROLATE 1 MG: 1 TABLET ORAL at 08:40

## 2024-09-29 RX ADMIN — AMLODIPINE BESYLATE 5 MG: 5 TABLET ORAL at 08:41

## 2024-09-29 RX ADMIN — LORATADINE 10 MG: 10 TABLET ORAL at 08:40

## 2024-09-29 RX ADMIN — NICOTINE POLACRILEX 2 MG: 2 GUM, CHEWING ORAL at 17:32

## 2024-09-29 RX ADMIN — NICOTINE POLACRILEX 2 MG: 2 GUM, CHEWING ORAL at 21:39

## 2024-09-29 RX ADMIN — METFORMIN HYDROCHLORIDE 500 MG: 500 TABLET, FILM COATED ORAL at 08:41

## 2024-09-29 RX ADMIN — FAMOTIDINE 20 MG: 20 TABLET ORAL at 08:40

## 2024-09-29 RX ADMIN — ARIPIPRAZOLE 15 MG: 15 TABLET ORAL at 08:40

## 2024-09-29 RX ADMIN — ESCITALOPRAM OXALATE 20 MG: 10 TABLET, FILM COATED ORAL at 08:41

## 2024-09-29 RX ADMIN — NICOTINE POLACRILEX 2 MG: 2 GUM, CHEWING ORAL at 09:40

## 2024-09-29 RX ADMIN — PANTOPRAZOLE SODIUM 20 MG: 20 TABLET, DELAYED RELEASE ORAL at 08:40

## 2024-09-29 RX ADMIN — HYDROCHLOROTHIAZIDE 12.5 MG: 12.5 TABLET ORAL at 08:40

## 2024-09-29 RX ADMIN — ATROPINE SULFATE 1 DROP: 10 SOLUTION/ DROPS OPHTHALMIC at 22:08

## 2024-09-29 RX ADMIN — SENNOSIDES AND DOCUSATE SODIUM 2 TABLET: 8.6; 5 TABLET ORAL at 21:32

## 2024-09-29 RX ADMIN — CYANOCOBALAMIN TAB 1000 MCG 1000 MCG: 1000 TAB at 08:41

## 2024-09-29 RX ADMIN — FAMOTIDINE 20 MG: 20 TABLET ORAL at 17:25

## 2024-09-29 RX ADMIN — SODIUM CHLORIDE 1000 ML: 0.9 INJECTION, SOLUTION INTRAVENOUS at 20:50

## 2024-09-29 RX ADMIN — ACETAMINOPHEN 650 MG: 325 TABLET, FILM COATED ORAL at 09:41

## 2024-09-29 NOTE — PROGRESS NOTES
"Progress Note - Behavioral Health   Name: Alberto Berumen 28 y.o. male I MRN: [id number]  Unit/Bed#: [facility] 108-02 I Date of Admission: 3/29/2024   Date of Service: 9/29/2024 I Hospital Day: 184        Assessment & Plan  Schizoaffective disorder, bipolar type (HCC)  Schizoaffective Disorder, Bipolar Type  Ongoing depressive symptoms with c/o AH of threatening voices that \"come and go\".   Continues to have paranoid delusions.  Status of diagnosis: slowly improving  Continue CRP, BNP, CBC with diff every 2 weeks, and weekly clozapine and CK in setting of Clozaril use (9/19/2024 CBC ANC 5.72). Clozaril level 9/26/24: 944   Due to Clozaril level of 944 with increased sedation on evening of 9/28 with mod-severe joint pain reported today, placed labs: Clozaril, CPK, CBC diff, BMP, high-sensitivity troponin.  Will obtain EKG.  Will continue or adjust Clozaril level after lab results reviewed.     Continue aripiprazole 15 mg tablet daily  Continue clozapine 200 mg tablet daily before dinner  Continue clozapine 300 gm tablet daily at bedtime  Continue cyanocobalamin 1000 mcg tablet daily  Continue escitalopram 20 mg tablet daily  Continue senna-docusate sodium two 50 mg tablets daily before bed  Chronic idiopathic constipation  Continue senna-docusate sodium two 50 mg tablets before bed per medical team      Planned medication and treatment changes:    All current active medications have been reviewed  Encourage group therapy, milieu therapy and occupational therapy  Behavioral Health checks every 7 minutes  Continue with SLIM medical management as indicated  Disposition planning ongoing      Check clozaril level, BMP, CBC w/diff, high-sensitivity troponin, CPK due to Clozaril level 944 with increased sedation and generalized joint pain.  Will consider adjusting Clozaril level after review of labs and EKG.      Current medications:  Current Facility-Administered Medications   Medication Dose Route Frequency Provider Last Rate "    acetaminophen  650 mg Oral Q4H PRN Jordan C Holter, DO      acetaminophen  650 mg Oral Q6H PRN HOLLI Lion      aluminum-magnesium hydroxide-simethicone  30 mL Oral Q4H PRN Jordan C Holter, DO      amLODIPine  5 mg Oral Daily HOLLI Lion      ARIPiprazole  15 mg Oral Daily Bora Rosario MD      Artificial Tears  1 drop Both Eyes Q3H PRN Jordan C Holter, DO      atropine  1 drop Sublingual HS Harjeet Torres, DO      haloperidol lactate  2.5 mg Intramuscular Q4H PRN Max 4/day STEVE LionNP      And    LORazepam  1 mg Intramuscular Q4H PRN Max 4/day STEVE LionNP      And    benztropine  0.5 mg Intramuscular Q4H PRN Max 4/day HOLLI Lion      benztropine  1 mg Intramuscular Q4H PRN Max 6/day Jordan C Holter, DO      haloperidol lactate  5 mg Intramuscular Q4H PRN Max 4/day HOLLI Lion      And    LORazepam  2 mg Intramuscular Q4H PRN Max 4/day HOLLI Lion      And    benztropine  1 mg Intramuscular Q4H PRN Max 4/day HOLLI Lion      benztropine  1 mg Oral Q4H PRN Max 6/day HOLLI Lion      benztropine  1 mg Oral Q4H PRN Max 6/day Jordan C Holter, DO      bisacodyl  10 mg Rectal Daily PRN HOLLI Lion      calcium carbonate  500 mg Oral BID PRN HOLLI Monge      cloZAPine  200 mg Oral Before Dinner Bora Rosario MD      cloZAPine  300 mg Oral HS Bora Rosario MD      cyanocobalamin  1,000 mcg Oral Daily HOLLI Galvan      hydrOXYzine HCL  50 mg Oral Q6H PRN Max 4/day Jordan C Holter, DO      Or    diphenhydrAMINE  50 mg Intramuscular Q6H PRN Jordan C Holter, DO      hydrOXYzine HCL  50 mg Oral Q6H PRN Max 4/day HOLLI Lion      Or    diphenhydrAMINE  50 mg Intramuscular Q6H PRN HOLLI Lion      diphenhydrAMINE-zinc acetate   Topical BID PRN HOLLI Lion      escitalopram  20 mg Oral Daily HOLLI Lion      famotidine  20 mg Oral BID HOLLI Monge      fluticasone  1 spray Each Nare  Daily Brina Guillen MD      glycopyrrolate  1 mg Oral BID (AM & Afternoon) Bora Rosario MD      glycopyrrolate  2 mg Oral HS Bora Rosario MD      haloperidol  1 mg Oral Q6H PRN HOLLI Lion      haloperidol  2.5 mg Oral Q4H PRN Max 4/day HOLLI Lion      haloperidol  5 mg Oral Q4H PRN Max 4/day HOLLI Lion      hydroCHLOROthiazide  12.5 mg Oral Daily HOLLI Galvan      hydrocortisone   Topical 4x Daily PRN HOLLI Lion      hydrOXYzine HCL  100 mg Oral Q6H PRN Max 4/day [facility] Holter, DO      Or    LORazepam  2 mg Intramuscular Q6H PRN [facility] Holter, DO      hydrOXYzine HCL  100 mg Oral Q6H PRN Max 4/day HOLLI Lion      Or    LORazepam  2 mg Intramuscular Q6H PRN HOLLI Lion      hydrOXYzine HCL  25 mg Oral Q6H PRN Max 4/day [facility] Holter, DO      ibuprofen  600 mg Oral Q8H PRN HOLLI Lion      loratadine  10 mg Oral Daily Brina Guillen MD      melatonin  3 mg Oral HS PRN Bora Rosario MD      metFORMIN  500 mg Oral BID With Meals HOLLI Galvan      methocarbamol  500 mg Oral Q6H PRN HOLLI Lion      nicotine polacrilex  2 mg Oral Q4H PRN Bora Rosario MD      OLANZapine  5 mg Oral Q4H PRN Max 3/day [facility] Holter, DO      Or    OLANZapine  2.5 mg Intramuscular Q4H PRN Max 3/day [facility] Holter, DO      OLANZapine  5 mg Oral Q3H PRN Max 3/day [facility] Holter, DO      Or    OLANZapine  5 mg Intramuscular Q3H PRN Max 3/day [facility] Holter, DO      OLANZapine  2.5 mg Oral Q4H PRN Max 6/day [facility] Holter, DO      ondansetron  4 mg Oral Q6H PRN HOLLI Lion      pantoprazole  20 mg Oral QAM [facility] Holter, DO      polyethylene glycol  17 g Oral Daily PRN HOLLI Ghotra      propranolol  10 mg Oral Q12H KAYLIE HOLLI Lion      senna-docusate sodium  1 tablet Oral Daily PRN Ion C Holter, DO      senna-docusate sodium  2 tablet Oral HS Melvina De Jesus DO      traZODone  50 mg Oral HS PRN HOLLI Lion  "petrolatum-mineral oil   Topical TID PRN HOLLI Lion         Risks / Benefits of Treatment:    Risks, benefits, and possible side effects of medications explained to patient and patient verbalizes understanding and agreement for treatment.    Subjective:    Behavior over the last 24 hours: unchanged.     Nursing reported pt was difficulty to arouse for HS medication last night, and generalized joint pain this morning, and pt c/o 4/10 joint pain and was given acetaminophen 650mg at 09:41.  No behavioral concerns reported.      On assessment today, pt is laying in bed awake and alert.  He reports generalized joint pain started this morning, which patient rates as 9.5 on a 0-10 scale with 10 being most severe.  He reports that there is pain in his elbows, knees, hips.  He denies any decrease in ROM aside from pain.  He reports some drooling although not outside of recent baseline.  He denies any stomach upset, diarrhea or constipation.  Most recent BM 9/28.  He is eating well and sleeping well.  He reports adequate fluid intake. He appears alert and oriented x 4.  He walks with steady gait.  He denies dizziness.      He reports ongoing AH of voices that \"come and go\".  He reports today they are \"not too bad\".  He reports depression and anxiety are \"ok\".  He denies passive or active SI/HI with plan or intent.  He denies panic symptoms.  He appears mildly depressed on approach although is pleasant and cooperative.  Pt does not appear to be responding to IS and no overt delusional content elicited during conversation.     Sleep: normal  Appetite: normal  Medication side effects: reports some drooling, joint pain   ROS: reports joint pain    Mental Status Evaluation:    Appearance:  age appropriate, casually dressed, dressed appropriately   Behavior:  pleasant, cooperative, calm   Speech:  normal rate, normal volume, normal pitch   Mood:  mildly depressed   Affect:  blunted   Thought Process:  organized, coherent, " goal directed   Associations: intact associations   Thought Content:  some paranoia   Perceptual Disturbances: does not appear responding to internal stimuli, reports AH of threatening voices   Risk Potential: Suicidal ideation - None  Homicidal ideation - None  Potential for aggression - No   Sensorium:  oriented to person, place, and time/date   Memory:  recent and remote memory grossly intact   Consciousness:  alert and awake   Attention/Concentration: attention span and concentration are age appropriate   Insight:  fair   Judgment: fair   Gait/Station: normal gait/station   Motor Activity: no abnormal movements     Vital signs in last 24 hours:    Temp:  [97.6 °F (36.4 °C)-98 °F (36.7 °C)] 97.6 °F (36.4 °C)  HR:  [88-97] 88  Resp:  [16-18] 16  BP: (116-162)/(57-99) 162/99         Laboratory results: I have personally reviewed all pertinent laboratory/tests results    Results from the past 24 hours: No results found for this or any previous visit (from the past 24 hour(s)).    Progress Toward Goals: no significant improvement today    Counseling / Coordination of Care:    Total floor / unit time spent today 15 minutes. Greater than 50% of total time was spent with the patient and / or family counseling and / or coordination of care. A description of counseling / coordination of care:  Patient's progress reviewed with nursing staff.  Reassurance and supportive therapy provided.  Encouraged participation in milieu and group therapy on the unit.        HOLLI Torres 09/29/24

## 2024-09-29 NOTE — PLAN OF CARE
Problem: Alteration in Thoughts and Perception  Goal: Treatment Goal: Gain control of psychotic behaviors/thinking, reduce/eliminate presenting symptoms and demonstrate improved reality functioning upon discharge  Outcome: Progressing  Goal: Verbalize thoughts and feelings  Description: Interventions:  - Promote a nonjudgmental and trusting relationship with the patient through active listening and therapeutic communication  - Assess patient's level of functioning, behavior and potential for risk  - Engage patient in 1 on 1 interactions  - Encourage patient to express fears, feelings, frustrations, and discuss symptoms    - [place] patient to reality, help patient recognize reality-based thinking   - Administer medications as ordered and assess for potential side effects  - Provide the patient education related to the signs and symptoms of the illness and desired effects of prescribed medications  Outcome: Progressing  Goal: Refrain from acting on delusional thinking/internal stimuli  Description: Interventions:  - Monitor patient closely, per order   - Utilize least restrictive measures   - Set reasonable limits, give positive feedback for acceptable   - Administer medications as ordered and monitor of potential side effects  Outcome: Progressing  Goal: Agree to be compliant with medication regime, as prescribed and report medication side effects  Description: Interventions:  - Offer appropriate PRN medication and supervise ingestion; conduct AIMS, as needed   Outcome: Progressing  Goal: Attend and participate in unit activities, including therapeutic, recreational, and educational groups  Description: Interventions:  -Encourage Visitation and family involvement in care  Outcome: Progressing  Goal: Complete daily ADLs, including personal hygiene independently, as able  Description: Interventions:  - Observe, teach, and assist patient with ADLS  - Monitor and promote a balance of rest/activity, with adequate  nutrition and elimination   Outcome: Progressing     Problem: Ineffective Coping  Goal: Participates in unit activities  Description: Interventions:  - Provide therapeutic environment   - Provide required programming   - Redirect inappropriate behaviors   Outcome: Progressing     Problem: Depression  Goal: Refrain from isolation  Description: Interventions:  - Develop a trusting relationship   - Encourage socialization   Outcome: Progressing  Goal: Refrain from self-neglect  Outcome: Progressing

## 2024-09-29 NOTE — NURSING NOTE
Pt came to nurse's station and c/o of generalized discomfort a 4/10. Tylenol administered @ 0941. Results pending.

## 2024-09-29 NOTE — NURSING NOTE
Pt c/o of Generalized joint pain, says he has never felt this kind of pain before. CRNP made aware, Clozapine level 944. Upon review pt has new orders for labs, EKG, and Clozaril on hold until results reviewed. Labs completed.

## 2024-09-29 NOTE — ASSESSMENT & PLAN NOTE
"Schizoaffective Disorder, Bipolar Type  Ongoing depressive symptoms with c/o AH of threatening voices that \"come and go\".   Continues to have paranoid delusions.  Status of diagnosis: slowly improving  Continue CRP, BNP, CBC with diff every 2 weeks, and weekly clozapine and CK in setting of Clozaril use (9/19/2024 CBC ANC 5.72). Clozaril level 9/26/24: 944   Due to Clozaril level of 944 with increased sedation on evening of 9/28 with mod-severe joint pain reported today, placed labs: Clozaril, CPK, CBC diff, BMP, high-sensitivity troponin.  Will obtain EKG.  Will continue or adjust Clozaril level after lab results reviewed.     Continue aripiprazole 15 mg tablet daily  Continue clozapine 200 mg tablet daily before dinner  Continue clozapine 300 gm tablet daily at bedtime  Continue cyanocobalamin 1000 mcg tablet daily  Continue escitalopram 20 mg tablet daily  Continue senna-docusate sodium two 50 mg tablets daily before bed  "

## 2024-09-29 NOTE — NURSING NOTE
The writer called the ED and spoke to nurse to give the charge nurse a message that Patient needs an IV insertion so a bolus can be hung.  ED nurse said she would let her charge nurse know immediately.

## 2024-09-29 NOTE — NURSING NOTE
CK results in and reviewed by Psych Provider and Medical Provider. N.O for sodium chloride 0.9% 1000ml bolus, Hold 1600 and 2200 dose of clozaril, CMP and CK level on 9/30/24 @ 0600.

## 2024-09-29 NOTE — NURSING NOTE
Patient sleeping most of the evening.  Isolative to room. Appears very tired and hard to wake up for his scheduled HS medications. Offered no complaints and went back to sleep immediately afterwards.

## 2024-09-29 NOTE — NURSING NOTE
Lungs CTA, normoactive bowl sounds in all quadrants, abdomin soft, non distended, no tenderness, or guarding. Dry feet/heels, encouraged to apply lotion to feet daily. No edema noted at this time.       Pt is pleasant and cooperative, visible on the unit, social with peers and staff. Pt reports depression, reports AH voices saying they are going to kill him and his family. Pt denies SI/HI/AVH. Pt is meal and medication complaint, safety checks ongoing.

## 2024-09-29 NOTE — PROGRESS NOTES
Labs reviewed.     Due to increased CK, will hold both 17:00 and HS clozapine doses until primary team can review and follow-up labs are reviewed.  Consulted with collaborating physician/ on call, who is in agreement with medication hold.  Through nursing, SLIM made aware of labs and EKG abnormality.  SLIM placed follow-up labs for morning draw (CK, CMP). Discussed changes with nursing, and encouraged nursing to page on-call provider with any worsening of symptoms or for precipitous changes throughout the night.

## 2024-09-30 LAB
ALBUMIN SERPL BCG-MCNC: 3.8 G/DL (ref 3.5–5)
ALP SERPL-CCNC: 64 U/L (ref 34–104)
ALT SERPL W P-5'-P-CCNC: 42 U/L (ref 7–52)
ANION GAP SERPL CALCULATED.3IONS-SCNC: 7 MMOL/L (ref 4–13)
AST SERPL W P-5'-P-CCNC: 26 U/L (ref 13–39)
ATRIAL RATE: 88 BPM
ATRIAL RATE: 90 BPM
BILIRUB SERPL-MCNC: 0.47 MG/DL (ref 0.2–1)
BUN SERPL-MCNC: 9 MG/DL (ref 5–25)
CALCIUM SERPL-MCNC: 9.1 MG/DL (ref 8.4–10.2)
CHLORIDE SERPL-SCNC: 103 MMOL/L (ref 96–108)
CK SERPL-CCNC: 1653 U/L (ref 39–308)
CO2 SERPL-SCNC: 29 MMOL/L (ref 21–32)
CREAT SERPL-MCNC: 0.91 MG/DL (ref 0.6–1.3)
GFR SERPL CREATININE-BSD FRML MDRD: 115 ML/MIN/1.73SQ M
GLUCOSE P FAST SERPL-MCNC: 83 MG/DL (ref 65–99)
GLUCOSE SERPL-MCNC: 83 MG/DL (ref 65–140)
P AXIS: 44 DEGREES
P AXIS: 46 DEGREES
POTASSIUM SERPL-SCNC: 3.8 MMOL/L (ref 3.5–5.3)
PR INTERVAL: 144 MS
PR INTERVAL: 150 MS
PROT SERPL-MCNC: 6.6 G/DL (ref 6.4–8.4)
QRS AXIS: 38 DEGREES
QRS AXIS: 39 DEGREES
QRSD INTERVAL: 80 MS
QRSD INTERVAL: 82 MS
QT INTERVAL: 346 MS
QT INTERVAL: 362 MS
QTC INTERVAL: 418 MS
QTC INTERVAL: 442 MS
SODIUM SERPL-SCNC: 139 MMOL/L (ref 135–147)
T WAVE AXIS: 16 DEGREES
T WAVE AXIS: 18 DEGREES
VENTRICULAR RATE: 88 BPM
VENTRICULAR RATE: 90 BPM

## 2024-09-30 PROCEDURE — 93010 ELECTROCARDIOGRAM REPORT: CPT | Performed by: INTERNAL MEDICINE

## 2024-09-30 PROCEDURE — 82550 ASSAY OF CK (CPK): CPT | Performed by: FAMILY MEDICINE

## 2024-09-30 PROCEDURE — 80053 COMPREHEN METABOLIC PANEL: CPT | Performed by: FAMILY MEDICINE

## 2024-09-30 PROCEDURE — 99232 SBSQ HOSP IP/OBS MODERATE 35: CPT | Performed by: PSYCHIATRY & NEUROLOGY

## 2024-09-30 RX ADMIN — NICOTINE POLACRILEX 2 MG: 2 GUM, CHEWING ORAL at 21:14

## 2024-09-30 RX ADMIN — FAMOTIDINE 20 MG: 20 TABLET ORAL at 17:26

## 2024-09-30 RX ADMIN — NICOTINE POLACRILEX 2 MG: 2 GUM, CHEWING ORAL at 13:38

## 2024-09-30 RX ADMIN — ATROPINE SULFATE 1 DROP: 10 SOLUTION/ DROPS OPHTHALMIC at 21:13

## 2024-09-30 RX ADMIN — ARIPIPRAZOLE 15 MG: 15 TABLET ORAL at 08:37

## 2024-09-30 RX ADMIN — FAMOTIDINE 20 MG: 20 TABLET ORAL at 08:37

## 2024-09-30 RX ADMIN — NICOTINE POLACRILEX 2 MG: 2 GUM, CHEWING ORAL at 09:42

## 2024-09-30 RX ADMIN — AMLODIPINE BESYLATE 5 MG: 5 TABLET ORAL at 08:37

## 2024-09-30 RX ADMIN — LORATADINE 10 MG: 10 TABLET ORAL at 08:37

## 2024-09-30 RX ADMIN — CYANOCOBALAMIN TAB 1000 MCG 1000 MCG: 1000 TAB at 08:37

## 2024-09-30 RX ADMIN — GLYCOPYRROLATE 1 MG: 1 TABLET ORAL at 08:37

## 2024-09-30 RX ADMIN — PROPRANOLOL HYDROCHLORIDE 10 MG: 10 TABLET ORAL at 21:14

## 2024-09-30 RX ADMIN — GLYCOPYRROLATE 2 MG: 1 TABLET ORAL at 21:13

## 2024-09-30 RX ADMIN — PROPRANOLOL HYDROCHLORIDE 10 MG: 10 TABLET ORAL at 08:37

## 2024-09-30 RX ADMIN — PANTOPRAZOLE SODIUM 20 MG: 20 TABLET, DELAYED RELEASE ORAL at 08:37

## 2024-09-30 RX ADMIN — HYDROCHLOROTHIAZIDE 12.5 MG: 12.5 TABLET ORAL at 08:37

## 2024-09-30 RX ADMIN — GLYCOPYRROLATE 1 MG: 1 TABLET ORAL at 13:38

## 2024-09-30 RX ADMIN — METFORMIN HYDROCHLORIDE 500 MG: 500 TABLET, FILM COATED ORAL at 08:37

## 2024-09-30 RX ADMIN — ESCITALOPRAM OXALATE 20 MG: 10 TABLET, FILM COATED ORAL at 08:37

## 2024-09-30 RX ADMIN — METFORMIN HYDROCHLORIDE 500 MG: 500 TABLET, FILM COATED ORAL at 17:26

## 2024-09-30 RX ADMIN — SENNOSIDES AND DOCUSATE SODIUM 2 TABLET: 8.6; 5 TABLET ORAL at 21:14

## 2024-09-30 NOTE — PLAN OF CARE
Problem: Alteration in Thoughts and Perception  Goal: Treatment Goal: Gain control of psychotic behaviors/thinking, reduce/eliminate presenting symptoms and demonstrate improved reality functioning upon discharge  Outcome: Progressing  Goal: Verbalize thoughts and feelings  Description: Interventions:  - Promote a nonjudgmental and trusting relationship with the patient through active listening and therapeutic communication  - Assess patient's level of functioning, behavior and potential for risk  - Engage patient in 1 on 1 interactions  - Encourage patient to express fears, feelings, frustrations, and discuss symptoms    - [place] patient to reality, help patient recognize reality-based thinking   - Administer medications as ordered and assess for potential side effects  - Provide the patient education related to the signs and symptoms of the illness and desired effects of prescribed medications  Outcome: Progressing  Goal: Refrain from acting on delusional thinking/internal stimuli  Description: Interventions:  - Monitor patient closely, per order   - Utilize least restrictive measures   - Set reasonable limits, give positive feedback for acceptable   - Administer medications as ordered and monitor of potential side effects  Outcome: Progressing  Goal: Agree to be compliant with medication regime, as prescribed and report medication side effects  Description: Interventions:  - Offer appropriate PRN medication and supervise ingestion; conduct AIMS, as needed   Outcome: Progressing  Goal: Attend and participate in unit activities, including therapeutic, recreational, and educational groups  Description: Interventions:  -Encourage Visitation and family involvement in care  Outcome: Progressing  Goal: Recognize dysfunctional thoughts, communicate reality-based thoughts at the time of discharge  Description: Interventions:  - Provide medication and psycho-education to assist patient in compliance and developing  insight into his/her illness   Outcome: Progressing  Goal: Complete daily ADLs, including personal hygiene independently, as able  Description: Interventions:  - Observe, teach, and assist patient with ADLS  - Monitor and promote a balance of rest/activity, with adequate nutrition and elimination   Outcome: Progressing     Problem: Ineffective Coping  Goal: Identifies ineffective coping skills  Outcome: Progressing  Goal: Identifies healthy coping skills  Outcome: Progressing  Goal: Demonstrates healthy coping skills  Outcome: Progressing  Goal: Participates in unit activities  Description: Interventions:  - Provide therapeutic environment   - Provide required programming   - Redirect inappropriate behaviors   Outcome: Progressing  Goal: Patient/Family participate in treatment and DC plans  Description: Interventions:  - Provide therapeutic environment  Outcome: Progressing  Goal: Patient/Family verbalizes awareness of resources  Outcome: Progressing     Problem: Depression  Goal: Treatment Goal: Demonstrate behavioral control of depressive symptoms, verbalize feelings of improved mood/affect, and adopt new coping skills prior to discharge  Outcome: Progressing  Goal: Verbalize thoughts and feelings  Description: Interventions:  - Assess and re-assess patient's level of risk   - Engage patient in 1:1 interactions, daily, for a minimum of 15 minutes   - Encourage patient to express feelings, fears, frustrations, hopes   Outcome: Progressing  Goal: Refrain from harming self  Description: Interventions:  - Monitor patient closely, per order   - Supervise medication ingestion, monitor effects and side effects   Outcome: Progressing  Goal: Refrain from isolation  Description: Interventions:  - Develop a trusting relationship   - Encourage socialization   Outcome: Progressing  Goal: Refrain from self-neglect  Outcome: Progressing  Goal: Attend and participate in unit activities, including therapeutic, recreational, and  educational groups  Description: Interventions:  - Provide therapeutic and educational activities daily, encourage attendance and participation, and document same in the medical record   Outcome: Progressing  Goal: Complete daily ADLs, including personal hygiene independently, as able  Description: Interventions:  - Observe, teach, and assist patient with ADLS  -  Monitor and promote a balance of rest/activity, with adequate nutrition and elimination   Outcome: Progressing     Problem: Anxiety  Goal: Anxiety is at manageable level  Description: Interventions:  - Assess and monitor patient's anxiety level.   - Monitor for signs and symptoms (heart palpitations, chest pain, shortness of breath, headaches, nausea, feeling jumpy, restlessness, irritable, apprehensive).   - Collaborate with interdisciplinary team and initiate plan and interventions as ordered.  - [place] patient to unit/surroundings  - Explain treatment plan  - Encourage participation in care  - Encourage verbalization of concerns/fears  - Identify coping mechanisms  - Assist in developing anxiety-reducing skills  - Administer/offer alternative therapies  - Limit or eliminate stimulants  Outcome: Progressing     Problem: Alteration in Orientation  Goal: Treatment Goal: Demonstrate a reduction of confusion and improved orientation to person, place, time and/or situation upon discharge, according to optimum baseline/ability  Outcome: Progressing  Goal: Interact with staff daily  Description: Interventions:  - Assess and re-assess patient's level of orientation  - Engage patient in 1 on 1 interactions, daily, for a minimum of 15 minutes   - Establish rapport/trust with patient   Outcome: Progressing  Goal: Express concerns related to confused thinking related to:  Description: Interventions:  - Encourage patient to express feelings, fears, frustrations, hopes  - Assign consistent caregivers   - [place]/re-orient patient as needed  - Allow comfort items, as  appropriate  - Provide visual cues, signs, etc.   Outcome: Progressing  Goal: Allow medical examinations, as recommended  Description: Interventions:  - Provide physical/neurological exams and/or referrals, per provider   Outcome: Progressing  Goal: Cooperate with recommended testing/procedures  Description: Interventions:  - Determine need for ancillary testing  - Observe for mental status changes  - Implement falls/precaution protocol   Outcome: Progressing  Goal: Attend and participate in unit activities, including therapeutic, recreational, and educational groups  Description: Interventions:  - Provide therapeutic and educational activities daily, encourage attendance and participation, and document same in the medical record   - Provide appropriate opportunities for reminiscence   - Provide a consistent daily routine   - Encourage family contact/visitation   Outcome: Progressing  Goal: Complete daily ADLs, including personal hygiene independently, as able  Description: Interventions:  - Observe, teach, and assist patient with ADLS  Outcome: Progressing

## 2024-09-30 NOTE — NURSING NOTE
ED nurse arrive and inserted an IV catheter at this time using the vein finder.  Patient was cooperative and reported he will remain in bed until his bolus is finished. Patient ate his snack in his room.  Pleasant

## 2024-09-30 NOTE — PROGRESS NOTES
"Psychiatry Progress Note [facility]    Alberto Berumen 28 y.o. male MRN: [id number]  Unit/Bed#: [facility] 108-02 Encounter: [id number]  Code Status: Level 1 - Full Code    PCP: Joce Juan MD    Date of Admission:  3/29/2024 2008   Date of Service:  09/30/24    Patient Active Problem List   Diagnosis    GERD (gastroesophageal reflux disease)    Medical clearance for psychiatric admission    Schizoaffective disorder, bipolar type (HCC)    Tobacco abuse    T wave inversion in EKG    Syringoma    Chronic idiopathic constipation    Vitamin B 12 deficiency    Vitamin D deficiency    Confluent and reticulate papillomatosis    Class 2 obesity in adult    Primary hypertension    Elevated hemoglobin A1c    Bilateral lower extremity edema         Review of systems: No symptoms reported  Psychiatric Diagnosis: Schizoaffective Disorder, Bipolar Type      Assessment  Overall Status: Patient reported feeling better since the onset of his joint pain yesterday. He reported sleeping well and feeling rested today, although he reported his depression symptoms were a 6/10 today. He continues to hear the threatening auditory hallucinations but stated they are at baseline for him today.   Certification Statement: The patient will continue to require additional inpatient hospital stay due to continued psychotic and severe symptoms of depression.     Assessment & Plan  Schizoaffective disorder, bipolar type (HCC)  Schizoaffective Disorder, Bipolar Type  Ongoing depressive symptoms with c/o AH of threatening voices that \"come and go\".   Continues to have paranoid delusions.  Status of diagnosis: slowly improving  Continue CRP, BNP, CBC with diff every 2 weeks, and weekly clozapine and CK in setting of Clozaril use.  Clozaril level of 944 on 9/26. CK level 2,278 with increased sedation and moderate generalized joint pain. CK level 1,653 on 9/30. Medical team aware. Clozaril continuing to be held until CK returns to " [place] INPATIENT ENCOUNTER  ENT DAILY PROGRESS NOTE    ADMISSION DATE:  9/24/2018  DATE:  9/26/2018  CURRENT HOSPITAL DAY:  Hospital Day: 3  ATTENDING PHYSICIAN:  Chata Myles MD  CODE STATUS:  No Order    CHIEF COMPLAINT:  Aphonia    ACTIVE PROBLEMS:  [unfilled]    INTERVAL HISTORY:  Nick Rubin is a 45 year old female patient admitted with ACUTE ON CHRONIC RESP FAILURE, PULM EDEMA.  Patient received her CT neck.  No symptomatic changes.    MEDICATIONS:  The medication list was reviewed today.    HISTORIES:  I have reviewed the past medical history, family history, social history, medications and allergies listed in the medical record as obtained by my nursing staff and support staff and agree with their documentation    OBJECTIVE:  VITAL SIGNS:  Vital Last Value 24 Hour Range   Temperature   No Data Recorded   Pulse   No Data Recorded   Respiratory   No Data Recorded   Non-Invasive  Blood Pressure   No Data Recorded   Pulse Oximetry   No Data Recorded     Vital Today Admitted   Weight       Height N/A     BMI N/A       INTAKE/OUTPUT:  No intake or output data in the 24 hours ending 09/26/18 1403    PHYSICAL EXAM:  Constitutional: Patient is a morbidly obese 45 year old female in no distress with fluent speech, strong voice, no stridor, and unlabored respirations. Affect is calm and patient is alert.  Respiratory: No accessory muscle use.  Neck: Neck supple. Trach tube in place, patent.  Face/Head: Normocephalic. No masses. Face is strong and symmetric. Light reflexes are symmetric.  Pupils equal, round, reactive to light.  Irides normal.  Other cranial nerves III-XII grossly intact.    EARS:  Left auricle: Normally-formed. No lesions.  Right auricle: Normally-formed. No lesions.    ORAL CAVITY:  Lips: Mobile. No lesions.  Teeth: Dentition in good repair.  Gingivae: Without lesions.    NOSE:  External: No lesion.    IMAGING STUDIES:  9/25/18 CT neck soft tissues w/o contrast report:  1.  A tracheostomy tube is  "normal levels.    Continue aripiprazole 15 mg tablet daily  Hold clozapine 200 mg tablet daily before dinner until CK returns to normal levels  Hold clozapine 300 gm tablet daily at bedtime until CK returns to normal levels  Continue cyanocobalamin 1000 mcg tablet daily  Continue escitalopram 20 mg tablet daily  Continue senna-docusate sodium two 50 mg tablets daily before bed  Chronic idiopathic constipation  Continue senna-docusate sodium two 50 mg tablets before bed per medical team         Medications:   Abilify 15 mg po daily, B12 1000 mg po daily, lexapro 20 mg po daily, glycopyrrolate 1 mg BID and 2 mg HS, propranolol 10 mg po BID, senna docusate sodium 2 tablets po HS.   Side effects from treatment: Reported drooling slightly last night but no more than baseline.    Medication changes   Holding Clozaril due to elevated CK levels   Medication education   Risks side effects benefits and precautions of medications discussed with patient and he did verbalize an understanding about risks for metabolic syndrome from being on neuroleptics and is form tardive dyskinesia etc.  All medications reviewed and I recommend that they be continued for symptom management   Understanding of medications: Verbalized    Justification for dual anti-psychotics: Inability to control psychotic symptoms     Non-pharmacological treatments  Continue with individual, group, milieu and occupational therapy using recovery principles and psycho-education about accepting illness and the need for treatment.    Safety  Safety and communication plan established to target dynamic risk factors discussed above.    Discharge Plan   Aunt's house vs group home    Interval Progress   Patient reported that he slept well last night and feels rested. He stated he did not drool much last night, and that it was about the same as usual. He reported still having a good appetite. He reported that his mood was \"stable\" and that his depression symptoms were a " patent and well-positioned. However, fluid  completely fills the subglottic larynx immediately proximal to the  tracheostomy insertion site. The pharynx and larynx are otherwise patent.  2.  Numerous enlarged superior mediastinal and mildly prominent lower  cervical lymph nodes are noted, partially imaged.  3.  Moderate pericardial effusion is partially imaged.  4.  Suggestion of fluid overload in the lung apices.                           ASSESSMENT:  Aphonia.  There may be some subglottic swelling.  Neck CT scan not especially helpful.  However, it did detect mediastinal lymphadenopathy.  It's possible the lymphadenopathy may be connected with pt's recent aphonia.    PLAN:  Patient will need workup of her lymphadenopathy.  Will defer to Dr. Myles regarding this.  OR endoscopy may be beneficial for exam of larynx and subglottic region.  Could also do trach change to a Portex 7.0 cuffed trach tube--this has slightly smaller outside diameter than current trach tube and could allow for speech.  Will make arrangements for OR direct laryngoscopy & trach tube change next week.  Discussed risks/benefits/alternatives of procedure with patient, she appears to understand & wishes to proceed.  Spent ~35 minutes w/pt and family, discussing CT findings & proposed E&M going forward.  > 1/2 of time spent in counselling.     6/10 today. He stated that he still believes that the auditory hallucinations that he hears contributes significantly to the depression he experiences. He continues to feel like the voices are out to get him but endorses that he feels safe in the hospital. He denied any suicidal or homicidal thoughts, plans, or intents. He mentioned that he spoke with his aunt yesterday after she called him and that he felt happy about that. He stated that she is fine with him moving back in with her after his discharge from the hospital. He also stated that she was expecting a call from a member of the treatment team sometime today before noon when she goes to work.   He had labs drawn yesterday that were abnormal and showed a high CK. He explained that he is feeling better since yesterday when the joint pain started. He received acetaminophen 650 mg yesterday at 0941 for generalized pain. He then received a sodium chloride 0.9% 1,000 mL bolus at 2050. His clozapine is being held for the time being.   Acceptance by patient: Yes   Hopefulness in recovery: Hoping to live with aunt   Involved in reintegration process: Aunt   Trusting in relationship with psychiatrist: Yes  Sleep: Good, 8+ hours  Appetite: Good  Compliance with Medications: Good  Group attendance: 7/10 groups  Significant events: Elevated CK level on labs yesterday. Generalized joint pain leading to PRN acetaminophen being given. Bolus given. Clozapine being held.      Mental Status Exam  Appearance: age appropriate, overweight, NAD, wearing hospital gown with hospital scrub pants  Behavior: cooperative, calm  Speech: normal rate, normal pitch, scant, soft  Mood: depressed  Affect:  depressed, flat, mood congruent  Thought Process: organized, linear  Thought Content: paranoid ideation, stating that he thinks the voices are out to get him and that they are going to kill him and his family despite endorsing that he feels safe in the hospital. He denied any suicidal or  homicidal thoughts, plans, or intents. No obsessions, compulsions, or distorted body perceptions verbalized. He denied any other experiences that would be categorized as grandiose, somatic or bizarre delusions.   Perceptual Disturbances: auditory hallucinations that threaten him and say that they are going to shoot him and his family. He states that they have not changed recently and that they continue to come and go. He states they are at baseline today and that they continue to either sound like his ex girlfriend or they sound like strangers to him., denies visual hallucinations when asked, does not appear responding to internal stimuli  Hx Risk Factors: chronic psychiatric problems, chronic depressive symptoms, chronic psychotic symptoms  Sensorium: alert and oriented to person, place, time and situation  Cognition: recent and remote memory grossly intact  Consciousness: alert and awake  Attention: attention span and concentration are age appropriate  Intellect: appears to be of average intelligence  Insight: limited  Judgement: impaired  Motor Activity: no abnormal movements     Vitals  Temp:  [97.5 °F (36.4 °C)-98 °F (36.7 °C)] 98 °F (36.7 °C)  HR:  [] 79  Resp:  [16-19] 16  BP: (103-129)/(65-90) 123/71  SpO2:  [97 %] 97 %    Intake/Output Summary (Last 24 hours) at 9/30/2024 0936  Last data filed at 9/29/2024 2050  Gross per 24 hour   Intake 1000 ml   Output --   Net 1000 ml       Lab Results: All Labs For Current Hospital Admission Reviewed    Current Facility-Administered Medications   Medication Dose Route Frequency Provider Last Rate    acetaminophen  650 mg Oral Q4H PRN Jordan C Holter, DO      acetaminophen  650 mg Oral Q6H PRN HOLLI Lion      aluminum-magnesium hydroxide-simethicone  30 mL Oral Q4H PRN Jordan C Holter, DO      amLODIPine  5 mg Oral Daily HOLLI Lion      ARIPiprazole  15 mg Oral Daily Bora Rosario MD      Artificial Tears  1 drop Both Eyes Q3H PRN Jordan C Holter,  DO      atropine  1 drop Sublingual HS Harjeet Torres, DO      haloperidol lactate  2.5 mg Intramuscular Q4H PRN Max 4/day HOLLI Lion      And    LORazepam  1 mg Intramuscular Q4H PRN Max 4/day HOLLI Lion      And    benztropine  0.5 mg Intramuscular Q4H PRN Max 4/day HOLLI Lion      benztropine  1 mg Intramuscular Q4H PRN Max 6/day Jordan C Holter, DO      haloperidol lactate  5 mg Intramuscular Q4H PRN Max 4/day HOLLI Lion      And    LORazepam  2 mg Intramuscular Q4H PRN Max 4/day HOLLI Lion      And    benztropine  1 mg Intramuscular Q4H PRN Max 4/day HOLLI Lion      benztropine  1 mg Oral Q4H PRN Max 6/day HOLLI Lion      benztropine  1 mg Oral Q4H PRN Max 6/day Jordan C Holter, DO      bisacodyl  10 mg Rectal Daily PRN HOLLI Lion      calcium carbonate  500 mg Oral BID PRN HOLLI Monge      [Held by provider] cloZAPine  200 mg Oral Before Dinner HOLLI Torres      [Held by provider] cloZAPine  300 mg Oral HS HOLLI Torres      cyanocobalamin  1,000 mcg Oral Daily HOLLI Galvan      hydrOXYzine HCL  50 mg Oral Q6H PRN Max 4/day Jordan C Holter,       Or    diphenhydrAMINE  50 mg Intramuscular Q6H PRN Jordan C Holter, DO      hydrOXYzine HCL  50 mg Oral Q6H PRN Max 4/day HOLLI Lion      Or    diphenhydrAMINE  50 mg Intramuscular Q6H PRN HOLLI Lion      diphenhydrAMINE-zinc acetate   Topical BID PRN HOLLI Lion      escitalopram  20 mg Oral Daily HOLLI Lion      famotidine  20 mg Oral BID HOLLI Monge      fluticasone  1 spray Each Nare Daily Brina Guillen MD      glycopyrrolate  1 mg Oral BID (AM & Afternoon) Bora Rosario MD      glycopyrrolate  2 mg Oral HS Bora Rosario MD      haloperidol  1 mg Oral Q6H PRN HOLLI Lion      haloperidol  2.5 mg Oral Q4H PRN Max 4/day HOLLI Lion      haloperidol  5 mg Oral Q4H PRN Max 4/day HOLLI Lion       hydroCHLOROthiazide  12.5 mg Oral Daily HOLLI Galvan      hydrocortisone   Topical 4x Daily PRN HOLLI Lion      hydrOXYzine HCL  100 mg Oral Q6H PRN Max 4/day [facility] Holter, DO      Or    LORazepam  2 mg Intramuscular Q6H PRN [facility] Holter, DO      hydrOXYzine HCL  100 mg Oral Q6H PRN Max 4/day HOLLI Lion      Or    LORazepam  2 mg Intramuscular Q6H PRN HOLLI Lion      hydrOXYzine HCL  25 mg Oral Q6H PRN Max 4/day [facility] Holter, DO      ibuprofen  600 mg Oral Q8H PRN HOLLI Lion      loratadine  10 mg Oral Daily Brina Guillen MD      melatonin  3 mg Oral HS PRN Bora Rosario MD      metFORMIN  500 mg Oral BID With Meals HOLLI Galvan      methocarbamol  500 mg Oral Q6H PRN HOLLI Lion      nicotine polacrilex  2 mg Oral Q4H PRN Bora Rosario MD      OLANZapine  5 mg Oral Q4H PRN Max 3/day [facility] Holter, DO      Or    OLANZapine  2.5 mg Intramuscular Q4H PRN Max 3/day [facility] Holter, DO      OLANZapine  5 mg Oral Q3H PRN Max 3/day [facility] Holter, DO      Or    OLANZapine  5 mg Intramuscular Q3H PRN Max 3/day [facility] Holter, DO      OLANZapine  2.5 mg Oral Q4H PRN Max 6/day [facility] Holter, DO      ondansetron  4 mg Oral Q6H PRN HOLLI Lion      pantoprazole  20 [facility]      polyethylene glycol  17 g Oral Daily PRN HOLLI Ghotra      propranolol  10 mg Oral Q12H KAYLIE HOLLI Lion      senna-docusate sodium  1 tablet Oral Daily PRN [facility] Holter, DO      senna-docusate sodium  2 tablet Oral HS Melvina Ambron, DO      traZODone  50 mg Oral HS PRN HOLLI Lion      white petrolatum-mineral oil   Topical TID PRN HOLLI Lion         Counseling / Coordination of Care: Total floor / unit time spent today 15 minutes. Greater than 50% of total time was spent with the patient and / or family counseling and / or somewhat receptive to supportive listening and teaching positive coping skills to deal with  symptom mangement.     Patient's Rights, confidentiality and exceptions to confidentiality, use of automated medical record, [facility] staff access to medical record, and consent to treatment reviewed.    This note has been dictated and hence there may be problems with punctuation, spelling and formatting and if anyone has any concerns please address them to the writer.  This note is not shared with patient due to potential for making patient's condition worse by knowing the content of the note.    Eveline WALLACE

## 2024-09-30 NOTE — SOCIAL WORK
BRANDY placed call to pt's aunt.   Aunt reports pt can now come home and live with her. Aunt inquired about programs available to him; BRANDY explained ACT team again. Aunt reports that she feels it would be beneficial to him and identified that she feels comfortable with him returning home.     BRANDY provided information on upcoming staff change and contact numbers for ongoing care coordination.

## 2024-09-30 NOTE — NURSING NOTE
"IV bolus completed and iv catheter was flushed with no issues. Patient received his scheduled HS medications.  Patient reported feeling \"OK\" and began ambulating the unit with select peer. Patient encouraged to drink two large cups of water and did cooperate with no issues. No further complaints of joint pain noted. No other signs or symptoms noted either.   "

## 2024-09-30 NOTE — NURSING NOTE
Patient agreed to leave his IV catheter intact after reporting he does not want to be stuck again tomorrow if he would need another bolus. B/P taken and was 120/90.  Pulse 99.

## 2024-09-30 NOTE — NURSING NOTE
ED nurse arrive and inserted an IV catheter at this time using the vein finder.  Patient was cooperative and reported he will remain in bed until his bolus is finished. Patient ate his snack in his room.  Supervisor came to see patient earlier, but was unable to find any veins without the vein finder.

## 2024-09-30 NOTE — SOCIAL WORK
SW sent email to [facility] inquiring about next steps for ACT services for pt as his MA ends 9/30 and he will have primary Medicare due to exceeding income limits for MA.

## 2024-09-30 NOTE — SOCIAL WORK
SW and pt met 1:1  Reviewed pt's thoughts regarding going home vs waiting on CRR availability. Pt presented and calm and forthcoming. SW and pt processed past instances of difficulties living at home. Reviewed medical complications and symptoms from the weekend.     SW informed pt of upcoming staff change. Pt exhibited appropriate response.

## 2024-09-30 NOTE — NURSING NOTE
Pt is visible on the unit, social with peers and staff. Pt reports feeling depressed, continues having AH, voices saying they are going to kill him and his family. Pt denies SI/HI/AVH. Pt is meal and medication complaint, Safety checks ongoing.

## 2024-09-30 NOTE — NURSING NOTE
Pt is calm and cooperative, visible on the unit. Pt ate 25% of dinner. Pt is medication compliant, safety checks ongoing.

## 2024-09-30 NOTE — PROGRESS NOTES
09/30/24 0749   Team Meeting   Meeting Type Daily Rounds   Team Members Present   Team Members Present Physician;Nurse;;Other (Discipline and Name)   Patient/Family Present   Patient Present No   Patient's Family Present No     In attendance:  Dr. Alex Thomas, MD Dr. Jordan Holter, DO Guy Taylor, RN  Luis[facility], [facility]  Carla Lux, [facility]    Groups: 7/10    Pt had high Clozaril level and elevated CK with joint pain symptoms. Pt given bolus of fluids, encouraged to drink. Labs improved but remain elevated. Pt pleasant, cooperative, denies ongoing pain.

## 2024-09-30 NOTE — PROGRESS NOTES
09/30/24 1239   Team Meeting   Meeting Type Tx Team Meeting   Initial Conference Date 09/30/24   Next Conference Date 10/14/24   Team Members Present   Team Members Present Physician;Nurse;;Other (Discipline and Name)   Physician Team Member Abraham   Nursing Team Member Claudia   Social Work Team Member Jenny   Other (Discipline and Name) Geronimo GUTIERREZ; Gaby MHDS   Patient/Family Present   Patient Present No   Patient's Family Present No   OTHER   Team Meeting - Additional Comments Pt politely declined to attend tx team meeting due to reportedly feeling too tired. Pt indicated it was a long weekend and he was not feeling well and it took a toll. SW and pt reviewed his recent communication with his aunt; SW will call again today. Pt did not attend but SW provided update to remaining tx team members.

## 2024-10-01 LAB
ANION GAP SERPL CALCULATED.3IONS-SCNC: 11 MMOL/L (ref 4–13)
BASOPHILS # BLD AUTO: 0.05 THOUSANDS/ÂΜL (ref 0–0.1)
BASOPHILS NFR BLD AUTO: 1 % (ref 0–1)
BUN SERPL-MCNC: 10 MG/DL (ref 5–25)
CALCIUM SERPL-MCNC: 10.3 MG/DL (ref 8.4–10.2)
CHLORIDE SERPL-SCNC: 100 MMOL/L (ref 96–108)
CK SERPL-CCNC: 1004 U/L (ref 39–308)
CO2 SERPL-SCNC: 28 MMOL/L (ref 21–32)
CREAT SERPL-MCNC: 0.98 MG/DL (ref 0.6–1.3)
EOSINOPHIL # BLD AUTO: 0.97 THOUSAND/ÂΜL (ref 0–0.61)
EOSINOPHIL NFR BLD AUTO: 10 % (ref 0–6)
ERYTHROCYTE [DISTWIDTH] IN BLOOD BY AUTOMATED COUNT: 14.7 % (ref 11.6–15.1)
GFR SERPL CREATININE-BSD FRML MDRD: 105 ML/MIN/1.73SQ M
GLUCOSE SERPL-MCNC: 111 MG/DL (ref 65–140)
HCT VFR BLD AUTO: 46.9 % (ref 36.5–49.3)
HGB BLD-MCNC: 14.2 G/DL (ref 12–17)
IMM GRANULOCYTES # BLD AUTO: 0.03 THOUSAND/UL (ref 0–0.2)
IMM GRANULOCYTES NFR BLD AUTO: 0 % (ref 0–2)
LYMPHOCYTES # BLD AUTO: 2.55 THOUSANDS/ÂΜL (ref 0.6–4.47)
LYMPHOCYTES NFR BLD AUTO: 25 % (ref 14–44)
MCH RBC QN AUTO: 23.8 PG (ref 26.8–34.3)
MCHC RBC AUTO-ENTMCNC: 30.3 G/DL (ref 31.4–37.4)
MCV RBC AUTO: 79 FL (ref 82–98)
MONOCYTES # BLD AUTO: 0.77 THOUSAND/ÂΜL (ref 0.17–1.22)
MONOCYTES NFR BLD AUTO: 8 % (ref 4–12)
NEUTROPHILS # BLD AUTO: 5.86 THOUSANDS/ÂΜL (ref 1.85–7.62)
NEUTS SEG NFR BLD AUTO: 56 % (ref 43–75)
NRBC BLD AUTO-RTO: 0 /100 WBCS
PLATELET # BLD AUTO: 263 THOUSANDS/UL (ref 149–390)
PMV BLD AUTO: 10.6 FL (ref 8.9–12.7)
POTASSIUM SERPL-SCNC: 3.7 MMOL/L (ref 3.5–5.3)
RBC # BLD AUTO: 5.96 MILLION/UL (ref 3.88–5.62)
SODIUM SERPL-SCNC: 139 MMOL/L (ref 135–147)
WBC # BLD AUTO: 10.23 THOUSAND/UL (ref 4.31–10.16)

## 2024-10-01 PROCEDURE — 85025 COMPLETE CBC W/AUTO DIFF WBC: CPT | Performed by: PSYCHIATRY & NEUROLOGY

## 2024-10-01 PROCEDURE — 82550 ASSAY OF CK (CPK): CPT | Performed by: PSYCHIATRY & NEUROLOGY

## 2024-10-01 PROCEDURE — 80048 BASIC METABOLIC PNL TOTAL CA: CPT | Performed by: PSYCHIATRY & NEUROLOGY

## 2024-10-01 PROCEDURE — 99232 SBSQ HOSP IP/OBS MODERATE 35: CPT | Performed by: PSYCHIATRY & NEUROLOGY

## 2024-10-01 RX ORDER — ARIPIPRAZOLE 10 MG/1
20 TABLET ORAL DAILY
Status: DISCONTINUED | OUTPATIENT
Start: 2024-10-02 | End: 2024-10-01

## 2024-10-01 RX ORDER — ARIPIPRAZOLE 15 MG/1
30 TABLET ORAL DAILY
Status: DISCONTINUED | OUTPATIENT
Start: 2024-10-02 | End: 2025-01-09

## 2024-10-01 RX ADMIN — NICOTINE POLACRILEX 2 MG: 2 GUM, CHEWING ORAL at 08:37

## 2024-10-01 RX ADMIN — METFORMIN HYDROCHLORIDE 500 MG: 500 TABLET, FILM COATED ORAL at 17:21

## 2024-10-01 RX ADMIN — PANTOPRAZOLE SODIUM 20 MG: 20 TABLET, DELAYED RELEASE ORAL at 08:34

## 2024-10-01 RX ADMIN — AMLODIPINE BESYLATE 5 MG: 5 TABLET ORAL at 08:33

## 2024-10-01 RX ADMIN — FAMOTIDINE 20 MG: 20 TABLET ORAL at 08:33

## 2024-10-01 RX ADMIN — PROPRANOLOL HYDROCHLORIDE 10 MG: 10 TABLET ORAL at 21:09

## 2024-10-01 RX ADMIN — ARIPIPRAZOLE 15 MG: 15 TABLET ORAL at 08:34

## 2024-10-01 RX ADMIN — CYANOCOBALAMIN TAB 1000 MCG 1000 MCG: 1000 TAB at 08:33

## 2024-10-01 RX ADMIN — LORATADINE 10 MG: 10 TABLET ORAL at 08:34

## 2024-10-01 RX ADMIN — ATROPINE SULFATE 1 DROP: 10 SOLUTION/ DROPS OPHTHALMIC at 21:09

## 2024-10-01 RX ADMIN — PROPRANOLOL HYDROCHLORIDE 10 MG: 10 TABLET ORAL at 08:33

## 2024-10-01 RX ADMIN — NICOTINE POLACRILEX 2 MG: 2 GUM, CHEWING ORAL at 17:22

## 2024-10-01 RX ADMIN — ESCITALOPRAM OXALATE 20 MG: 10 TABLET, FILM COATED ORAL at 08:33

## 2024-10-01 RX ADMIN — NICOTINE POLACRILEX 2 MG: 2 GUM, CHEWING ORAL at 13:19

## 2024-10-01 RX ADMIN — GLYCOPYRROLATE 1 MG: 1 TABLET ORAL at 14:10

## 2024-10-01 RX ADMIN — GLYCOPYRROLATE 1 MG: 1 TABLET ORAL at 08:34

## 2024-10-01 RX ADMIN — NICOTINE POLACRILEX 2 MG: 2 GUM, CHEWING ORAL at 21:09

## 2024-10-01 RX ADMIN — METFORMIN HYDROCHLORIDE 500 MG: 500 TABLET, FILM COATED ORAL at 08:33

## 2024-10-01 RX ADMIN — SENNOSIDES AND DOCUSATE SODIUM 2 TABLET: 8.6; 5 TABLET ORAL at 21:09

## 2024-10-01 RX ADMIN — GLYCOPYRROLATE 2 MG: 1 TABLET ORAL at 21:09

## 2024-10-01 RX ADMIN — HYDROCHLOROTHIAZIDE 12.5 MG: 12.5 TABLET ORAL at 08:32

## 2024-10-01 RX ADMIN — FAMOTIDINE 20 MG: 20 TABLET ORAL at 17:21

## 2024-10-01 NOTE — PROGRESS NOTES
"Psychiatry Progress Note [facility]    Alberto Berumen 28 y.o. male MRN: [id number]  Unit/Bed#: -02 Encounter: [id number]  Code Status: Level 1 - Full Code    PCP: Joce Juan MD    Date of Admission:  3/29/2024 2008   Date of Service:  10/01/24    Patient Active Problem List   Diagnosis    GERD (gastroesophageal reflux disease)    Medical clearance for psychiatric admission    Schizoaffective disorder, bipolar type (HCC)    Tobacco abuse    T wave inversion in EKG    Syringoma    Chronic idiopathic constipation    Vitamin B 12 deficiency    Vitamin D deficiency    Confluent and reticulate papillomatosis    Class 2 obesity in adult    Primary hypertension    Elevated hemoglobin A1c    Bilateral lower extremity edema       Assessment & Plan  Schizoaffective disorder, bipolar type (HCC)  Schizoaffective Disorder, Bipolar Type  Ongoing depressive symptoms with c/o AH of threatening voices that \"come and go\".   Continues to have paranoid delusions.  Status of diagnosis: slowly improving  Continue CRP, BNP, CBC with diff every 2 weeks, and weekly clozapine and CK in setting of Clozaril use (9/19/2024 CBC ANC 5.72). Clozaril level 9/26/24: 944   CK level 2,278 with increased sedation and moderate generalized joint pain on 9/29. CK level 1,653 on 9/30. CK level ordered for today.     Increase aripiprazole to 30 mg tablet daily  Hold clozapine 200 mg tablet daily before dinner  Hold clozapine 300 gm tablet daily at bedtime  Continue cyanocobalamin 1000 mcg tablet daily  Continue escitalopram 20 mg tablet daily  Continue senna-docusate sodium two 50 mg tablets daily before bed    No associated orders from this encounter found during lookback period of 72 hours.    Chronic idiopathic constipation  Continue senna-docusate sodium two 50 mg tablets before bed per medical team    No associated orders from this encounter found during lookback period of 72 hours.     Review of Systems: No symptoms " reported  Psychiatric Diagnosis: Schizoaffective Disorder, Bipolar Type     Assessment  Overall Status: Patient reported feeling relieved that he feels better physically today. He reported feeling well rested after sleeping approximately 8 hours last night. He continues to endorse feeling depressed with his symptoms at a 5/10 today. He continues to hear threatening auditory hallucinations that are at baseline today   Certification Statement: The patient will continue to require additional inpatient hospital stay due to continued psychotic and severe symptoms of depression.         Medications: Abilify 30 mg po daily, B12 1000 mg po daily, Lexapro 20 mg po daily, glycopyrrolate 1 mg BID and 2 mg HS, propranolol 10 mg po BID, senna docusate sodium 2 tablets po HS.  Side effects from treatment: None reported   Medication changes   Increase Abilify from 15 mg to 30 mg and continue holding clozapine    Medication education  Risks side effects benefits and precautions of medications discussed with patient and he did verbalize an understanding about risks for metabolic syndrome from being on neuroleptics and is form tardive dyskinesia etc.  All medications reviewed and I recommend that they be continued for symptom management  Understanding of medications: Verbalized   Justification for dual anti-psychotics: Inability to control psychotic symptoms      Non-pharmacological treatments  Continue with individual, group, milieu and occupational therapy using recovery principles and psycho-education about accepting illness and the need for treatment.    Safety  Safety and communication plan established to target dynamic risk factors discussed above.    Discharge Plan   Aunt's house    Interval Progress   Patient reported sleeping for approximately 8 hours last night and reported feeling rested today despite feeling like he was unable to get into a deep sleep last night. He reported having a good appetite and stated that he only  "ate a little bit of his dinner last night due to not liking the taste of the majority of it. He reported that his mood is \"stable\" and that his depression symptoms are at a 5/10 today. He stated he continues to hear the auditory hallucinations that he has been hearing which is contributing to his depression. He continues to feel like the voices are out to get him and want to kill him and his family however he endorses feeling safe in the hospital. He denied any suicidal or homicidal thoughts, plans, or intents. He mentioned feeling relieved that the treatment team was able to get a hold of his aunt and that she told the team she is willing to have Alberto come live with her again. He reported feeling excited but nervous about this since he is worried he will not be able to control the voices once he is discharged from the hospital. He denied feeling like he learned any coping skills from his time so far on the unit to help control the voices once he is discharged.   He will have labs drawn this morning to check his CK level but reported feeling much better physically and is very relieved. He stated the incidents on Sunday were scary for him but he is glad that he feels better. He had his IV removed yesterday and reported no pain today. He was reminded to continue to drink water and to let nursing know if he begins to experience any pain again.   Acceptance by patient: Yes   Hopefulness in recovery: Hoping to live with aunt   Involved in reintegration process: Aunt   Trusting in relationship with psychiatrist: Yes  Sleep: Good, approximately 8 hours  Appetite: Good  Compliance with Medications: Good  Group attendance: 7/9 groups  Significant events: None reported      Mental Status Exam  Appearance: age appropriate, overweight, wearing personal sweatshirt with restrepo up and hospital gown underneath with hospital scrub pants.   Behavior: cooperative, calm  Speech: normal rate, normal pitch, scant, soft  Mood: " depressed  Affect:  depressed, flat, mood congruent  Thought Process: organized, linear  Thought Content: paranoid ideation, stating that he feels that the voices are out to get him and that they are going to kill him and his family when he leaves the hospital. He reported feeling safe currently in the hospital. He denied any suicidal or homicidal thoughts, plans, or intents. No obsessions, compulsions, or distorted body perceptions verbalized. He denied any other experiences that would be categorized as grandiose, somatic, or bizarre delusions.   Perceptual Disturbances: auditory hallucinations telling him they are going to kill him and his family when he gets discharged from the hospital. He states they have not changed recently and that they continue to come and go but he hears them every day. He continues to state that they either sound like his ex girlfriend or they sound like strangers to him. , denies visual hallucinations when asked, does not appear responding to internal stimuli  Hx Risk Factors: chronic psychiatric problems, chronic depressive symptoms, chronic psychotic symptoms  Sensorium: alert and oriented to person, place, time and situation  Cognition: recent and remote memory grossly intact  Consciousness: alert and awake  Attention: attention span and concentration are age appropriate  Intellect: appears to be of average intelligence  Insight: limited  Judgement: impaired  Motor Activity: no abnormal movements     Vitals  Temp:  [97.5 °F (36.4 °C)-98.3 °F (36.8 °C)] 98.3 °F (36.8 °C)  HR:  [69-99] 69  Resp:  [17-18] 17  BP: (109-142)/(53-99) 109/53  SpO2:  [99 %] 99 %  No intake or output data in the 24 hours ending 10/01/24 0946    Lab Results: All Labs For Current Hospital Admission Reviewed    Current Facility-Administered Medications   Medication Dose Route Frequency Provider Last Rate    acetaminophen  650 mg Oral Q4H PRN Jordan C Holter,       acetaminophen  650 mg Oral Q6H PRN Eveline  HOLLI Hunt      aluminum-magnesium hydroxide-simethicone  30 mL Oral Q4H PRN Jordan C Holter, DO      amLODIPine  5 mg Oral Daily STEVE LionNP      ARIPiprazole  15 mg Oral Daily Bora Rosario MD      Artificial Tears  1 drop Both Eyes Q3H PRN Jordan C Holter, DO      atropine  1 drop Sublingual HS Harjeet Torres, DO      haloperidol lactate  2.5 mg Intramuscular Q4H PRN Max 4/day STEVE LionNP      And    LORazepam  1 mg Intramuscular Q4H PRN Max 4/day STEVE LionNP      And    benztropine  0.5 mg Intramuscular Q4H PRN Max 4/day STEVE LionNP      benztropine  1 mg Intramuscular Q4H PRN Max 6/day Jordan C Holter, DO      haloperidol lactate  5 mg Intramuscular Q4H PRN Max 4/day STEVE LionNP      And    LORazepam  2 mg Intramuscular Q4H PRN Max 4/day STEVE LionNP      And    benztropine  1 mg Intramuscular Q4H PRN Max 4/day HOLLI Lion      benztropine  1 mg Oral Q4H PRN Max 6/day HOLLI Lion      benztropine  1 mg Oral Q4H PRN Max 6/day Jordan C Holter, DO      bisacodyl  10 mg Rectal Daily PRN HOLLI Lion      calcium carbonate  500 mg Oral BID PRN HOLLI Monge      cyanocobalamin  1,000 mcg Oral Daily HOLLI Galvan      hydrOXYzine HCL  50 mg Oral Q6H PRN Max 4/day Jordan C Holter, DO      Or    diphenhydrAMINE  50 mg Intramuscular Q6H PRN Jordan C Holter, DO      hydrOXYzine HCL  50 mg Oral Q6H PRN Max 4/day HOLLI Lion      Or    diphenhydrAMINE  50 mg Intramuscular Q6H PRN HOLLI Lion      diphenhydrAMINE-zinc acetate   Topical BID PRN HOLLI Lion      escitalopram  20 mg Oral Daily HOLLI Lion      famotidine  20 mg Oral BID HOLLI Monge      fluticasone  1 spray Each Nare Daily Brina Guillen MD      glycopyrrolate  1 mg Oral BID (AM & Afternoon) Bora Rosario MD      glycopyrrolate  2 mg Oral HS Bora Rosario MD      haloperidol  1 mg Oral Q6H PRN HOLLI Lion      haloperidol   2.5 mg Oral Q4H PRN Max 4/day HOLLI Lion      haloperidol  5 mg Oral Q4H PRN Max 4/day HOLLI Lion      hydroCHLOROthiazide  12.5 mg Oral Daily HOLLI Galvan      hydrocortisone   Topical 4x Daily PRN HOLLI Lion      hydrOXYzine HCL  100 mg Oral Q6H PRN Max 4/day [facility] Holter, DO      Or    LORazepam  2 mg Intramuscular Q6H PRN [facility] Holter, DO      hydrOXYzine HCL  100 mg Oral Q6H PRN Max 4/day HOLLI Lion      Or    LORazepam  2 mg Intramuscular Q6H PRN HOLLI Lion      hydrOXYzine HCL  25 mg Oral Q6H PRN Max 4/day [facility] Holter, DO      ibuprofen  600 mg Oral Q8H PRN HOLLI Lion      loratadine  10 mg Oral Daily Brina Guillen MD      melatonin  3 mg Oral HS PRN Bora Rosario MD      metFORMIN  500 mg Oral BID With Meals HOLLI Galvan      methocarbamol  500 mg Oral Q6H PRN HOLLI Lion      nicotine polacrilex  2 mg Oral Q4H PRN Bora Rosario MD      OLANZapine  5 mg Oral Q4H PRN Max 3/day [facility] Holter, DO      Or    OLANZapine  2.5 mg Intramuscular Q4H PRN Max 3/day [facility] Holter, DO      OLANZapine  5 mg Oral Q3H PRN Max 3/day [facility] Holter, DO      Or    OLANZapine  5 mg Intramuscular Q3H PRN Max 3/day [facility] Holter, DO      OLANZapine  2.5 mg Oral Q4H PRN Max 6/day [facility] Holter, DO      ondansetron  4 mg Oral Q6H PRN HOLLI Lion      pantoprazole  20 mg Oral QAM [facility] Holter, DO      polyethylene glycol  17 g Oral Daily PRN HOLLI Ghotra      propranolol  10 mg Oral Q12H KAYLIE HOLLI Lion      senna-docusate sodium  1 tablet Oral Daily PRN Ion  Holter, DO      senna-docusate sodium  2 tablet Oral HS Melvina Ambron, DO      traZODone  50 mg Oral HS PRN HOLLI Lion      white petrolatum-mineral oil   Topical TID PRN HOLLI Lion         Counseling / Coordination of Care: Total floor / unit time spent today 15 minutes. Greater than 50% of total time was spent with the patient and /  or family counseling and / or somewhat receptive to supportive listening and teaching positive coping skills to deal with symptom mangement.     Patient's Rights, confidentiality and exceptions to confidentiality, use of automated medical record, Behavioral Health Services staff access to medical record, and consent to treatment reviewed.    This note has been dictated and hence there may be problems with punctuation, spelling and formatting and if anyone has any concerns please address them to the writer.  This note is not shared with patient due to potential for making patient's condition worse by knowing the content of the note.    Eveline WALLACE

## 2024-10-01 NOTE — NURSING NOTE
Alberto maintained on ongoing SAFE precaution without incident on this shift. Awake, alert , visible, withdrawn, and cooperative upon approach. Attended and participated in 7 out of 9 groups today. Continues to be compliant with medication regimen and had snack. Denies any pain or discomfort.  No inappropriate contact between him and a female peer noted. Denies all psych symptoms

## 2024-10-01 NOTE — PROGRESS NOTES
10/01/24 0742   Team Meeting   Meeting Type Daily Rounds   Team Members Present   Team Members Present Physician;Nurse;;Other (Discipline and Name)   Patient/Family Present   Patient Present No   Patient's Family Present No     In attendance:  Dr. Alex Thomas, MD Dr. Jordan Holter, DO Guy Taylor, RN  Luisana Alvarado, [facility]  Carla Lux, [facility]  MATILDA Daniel.S.    Groups: 7/9    Pt reports physically feeling better. Clozaril temporarily discontinued as levels are monitored. IV removed; needs labs drawn. Aunt accepting of pt to return home; waiting on [facility] timeline for funding for ACT services.

## 2024-10-01 NOTE — PLAN OF CARE
Problem: Alteration in Thoughts and Perception  Goal: Verbalize thoughts and feelings  Description: Interventions:  - Promote a nonjudgmental and trusting relationship with the patient through active listening and therapeutic communication  - Assess patient's level of functioning, behavior and potential for risk  - Engage patient in 1 on 1 interactions  - Encourage patient to express fears, feelings, frustrations, and discuss symptoms    - [place] patient to reality, help patient recognize reality-based thinking   - Administer medications as ordered and assess for potential side effects  - Provide the patient education related to the signs and symptoms of the illness and desired effects of prescribed medications  Outcome: Progressing  Goal: Agree to be compliant with medication regime, as prescribed and report medication side effects  Description: Interventions:  - Offer appropriate PRN medication and supervise ingestion; conduct AIMS, as needed   Outcome: Progressing     Problem: Ineffective Coping  Goal: Demonstrates healthy coping skills  Outcome: Progressing  Goal: Participates in unit activities  Description: Interventions:  - Provide therapeutic environment   - Provide required programming   - Redirect inappropriate behaviors   Outcome: Progressing     Problem: Depression  Goal: Refrain from harming self  Description: Interventions:  - Monitor patient closely, per order   - Supervise medication ingestion, monitor effects and side effects   Outcome: Progressing  Goal: Refrain from isolation  Description: Interventions:  - Develop a trusting relationship   - Encourage socialization   Outcome: Progressing  Goal: Refrain from self-neglect  Outcome: Progressing

## 2024-10-01 NOTE — NURSING NOTE
"Patient is visible and social on the milieu. He states \"I am hanging in there\" when asked how he is doing. \"I am trying to stay positive.\" Patient attended groups. His appetite is good. He is compliant with medications.   "

## 2024-10-02 PROCEDURE — 99232 SBSQ HOSP IP/OBS MODERATE 35: CPT | Performed by: PSYCHIATRY & NEUROLOGY

## 2024-10-02 RX ADMIN — FAMOTIDINE 20 MG: 20 TABLET ORAL at 17:00

## 2024-10-02 RX ADMIN — GLYCOPYRROLATE 1 MG: 1 TABLET ORAL at 08:36

## 2024-10-02 RX ADMIN — FAMOTIDINE 20 MG: 20 TABLET ORAL at 08:35

## 2024-10-02 RX ADMIN — METFORMIN HYDROCHLORIDE 500 MG: 500 TABLET, FILM COATED ORAL at 08:38

## 2024-10-02 RX ADMIN — NICOTINE POLACRILEX 2 MG: 2 GUM, CHEWING ORAL at 08:36

## 2024-10-02 RX ADMIN — NICOTINE POLACRILEX 2 MG: 2 GUM, CHEWING ORAL at 12:48

## 2024-10-02 RX ADMIN — METFORMIN HYDROCHLORIDE 500 MG: 500 TABLET, FILM COATED ORAL at 16:58

## 2024-10-02 RX ADMIN — SENNOSIDES AND DOCUSATE SODIUM 2 TABLET: 8.6; 5 TABLET ORAL at 21:08

## 2024-10-02 RX ADMIN — NICOTINE POLACRILEX 2 MG: 2 GUM, CHEWING ORAL at 21:09

## 2024-10-02 RX ADMIN — PANTOPRAZOLE SODIUM 20 MG: 20 TABLET, DELAYED RELEASE ORAL at 08:35

## 2024-10-02 RX ADMIN — ESCITALOPRAM OXALATE 20 MG: 10 TABLET, FILM COATED ORAL at 08:38

## 2024-10-02 RX ADMIN — TRAZODONE HYDROCHLORIDE 50 MG: 50 TABLET ORAL at 21:30

## 2024-10-02 RX ADMIN — AMLODIPINE BESYLATE 5 MG: 5 TABLET ORAL at 08:37

## 2024-10-02 RX ADMIN — GLYCOPYRROLATE 1 MG: 1 TABLET ORAL at 14:22

## 2024-10-02 RX ADMIN — HYDROCHLOROTHIAZIDE 12.5 MG: 12.5 TABLET ORAL at 08:39

## 2024-10-02 RX ADMIN — PROPRANOLOL HYDROCHLORIDE 10 MG: 10 TABLET ORAL at 21:29

## 2024-10-02 RX ADMIN — CYANOCOBALAMIN TAB 1000 MCG 1000 MCG: 1000 TAB at 08:35

## 2024-10-02 RX ADMIN — NICOTINE POLACRILEX 2 MG: 2 GUM, CHEWING ORAL at 16:58

## 2024-10-02 RX ADMIN — LORATADINE 10 MG: 10 TABLET ORAL at 08:38

## 2024-10-02 RX ADMIN — GLYCOPYRROLATE 2 MG: 1 TABLET ORAL at 21:08

## 2024-10-02 RX ADMIN — PROPRANOLOL HYDROCHLORIDE 10 MG: 10 TABLET ORAL at 08:36

## 2024-10-02 RX ADMIN — ATROPINE SULFATE 1 DROP: 10 SOLUTION/ DROPS OPHTHALMIC at 21:08

## 2024-10-02 RX ADMIN — ARIPIPRAZOLE 30 MG: 15 TABLET ORAL at 08:37

## 2024-10-02 NOTE — PLAN OF CARE
Problem: Alteration in Thoughts and Perception  Goal: Verbalize thoughts and feelings  Description: Interventions:  - Promote a nonjudgmental and trusting relationship with the patient through active listening and therapeutic communication  - Assess patient's level of functioning, behavior and potential for risk  - Engage patient in 1 on 1 interactions  - Encourage patient to express fears, feelings, frustrations, and discuss symptoms    - [place] patient to reality, help patient recognize reality-based thinking   - Administer medications as ordered and assess for potential side effects  - Provide the patient education related to the signs and symptoms of the illness and desired effects of prescribed medications  Outcome: Progressing  Goal: Refrain from acting on delusional thinking/internal stimuli  Description: Interventions:  - Monitor patient closely, per order   - Utilize least restrictive measures   - Set reasonable limits, give positive feedback for acceptable   - Administer medications as ordered and monitor of potential side effects  Outcome: Progressing  Goal: Agree to be compliant with medication regime, as prescribed and report medication side effects  Description: Interventions:  - Offer appropriate PRN medication and supervise ingestion; conduct AIMS, as needed   Outcome: Progressing  Goal: Complete daily ADLs, including personal hygiene independently, as able  Description: Interventions:  - Observe, teach, and assist patient with ADLS  - Monitor and promote a balance of rest/activity, with adequate nutrition and elimination   Outcome: Progressing     Problem: Ineffective Coping  Goal: Demonstrates healthy coping skills  Outcome: Progressing  Goal: Participates in unit activities  Description: Interventions:  - Provide therapeutic environment   - Provide required programming   - Redirect inappropriate behaviors   Outcome: Progressing     Problem: Depression  Goal: Refrain from harming  self  Description: Interventions:  - Monitor patient closely, per order   - Supervise medication ingestion, monitor effects and side effects   Outcome: Progressing  Goal: Refrain from isolation  Description: Interventions:  - Develop a trusting relationship   - Encourage socialization   Outcome: Progressing  Goal: Refrain from self-neglect  Outcome: Progressing     Problem: Anxiety  Goal: Anxiety is at manageable level  Description: Interventions:  - Assess and monitor patient's anxiety level.   - Monitor for signs and symptoms (heart palpitations, chest pain, shortness of breath, headaches, nausea, feeling jumpy, restlessness, irritable, apprehensive).   - Collaborate with interdisciplinary team and initiate plan and interventions as ordered.  - [place] patient to unit/surroundings  - Explain treatment plan  - Encourage participation in care  - Encourage verbalization of concerns/fears  - Identify coping mechanisms  - Assist in developing anxiety-reducing skills  - Administer/offer alternative therapies  - Limit or eliminate stimulants  Outcome: Progressing

## 2024-10-02 NOTE — PROGRESS NOTES
10/02/24 0736   Team Meeting   Meeting Type Daily Rounds   Team Members Present   Team Members Present Physician;Nurse;;Other (Discipline and Name)   Patient/Family Present   Patient Present No   Patient's Family Present No     In attendance:  Dr. Alex Thomas, MD Dr. Jordan Holter, DO Guy Taylor, RN  [facility] Alvarado, [facility]  Carla Lux, [facility]    Groups: 8/10    Pt labs drawn and are improving. Refused flu shot. Abilify increased to 30mg daily; Clozaril stopped for now. Pt reports some depression associated with ongoing AH. Plan is now to return home with aunt once [facility] can secure ACT funding.

## 2024-10-02 NOTE — NURSING NOTE
Pt is visible on the unit and social with select peers. Took medications without incidence. Pt is pleasant and cooperative. Attended 8/10 groups. Reports depression related to AH that threaten to kill him and his family. Denies anxiety and SI/HI/VH. No behavioral issues. Pt offers no complaints. Continuous safety checks maintained.

## 2024-10-02 NOTE — PROGRESS NOTES
"Psychiatry Progress Note [facility]    Alberto Berumen 28 y.o. male MRN: [id number]  Unit/Bed#: -02 Encounter: [id number]  Code Status: Level 1 - Full Code    PCP: Joce Juan MD    Date of Admission:  3/29/2024 2008   Date of Service:  10/02/24    Patient Active Problem List   Diagnosis    GERD (gastroesophageal reflux disease)    Medical clearance for psychiatric admission    Schizoaffective disorder, bipolar type (HCC)    Tobacco abuse    T wave inversion in EKG    Syringoma    Chronic idiopathic constipation    Vitamin B 12 deficiency    Vitamin D deficiency    Confluent and reticulate papillomatosis    Class 2 obesity in adult    Primary hypertension    Elevated hemoglobin A1c    Bilateral lower extremity edema       Assessment & Plan  Schizoaffective disorder, bipolar type (HCC)  Schizoaffective Disorder, Bipolar Type  Ongoing depressive symptoms with c/o AH of threatening voices that \"come and go\".   Continues to have paranoid delusions.  Status of diagnosis: slowly improving  Continue CRP, BNP, CBC with diff every 2 weeks.   Discontinued Clozaril due to increased CK levels.     Continue aripiprazole 30 mg tablet daily  Discontinued clozapine 200 mg tablet daily before dinner  Discontinued clozapine 300 gm tablet daily at bedtime  Continue cyanocobalamin 1000 mcg tablet daily  Continue escitalopram 20 mg tablet daily  Continue senna-docusate sodium two 50 mg tablets daily before bed    No associated orders from this encounter found during lookback period of 72 hours.    Chronic idiopathic constipation  Continue senna-docusate sodium two 50 mg tablets before bed per medical team    No associated orders from this encounter found during lookback period of 72 hours.     Review of Systems: No symptoms reported  Psychiatric Diagnosis: Schizoaffective Disorder, Bipolar Type     Assessment  Overall Status: Patient denied any physical symptoms but reported feeling like he did not " "sleep well last night due to the voices he hears being bothersome. He continues to endorse feeling depressed and stated that his symptoms were at a 6/10 today.   Certification Statement: The patient will continue to require additional inpatient hospital stay due to continued psychotic and severe symptoms of depression        Medications:   Abilify 30 mg po daily, B12 1000 mg po daily, Lexapro 20 mg po daily, glycopyrrolate 1 mg BID and 2 mg HS, propranolol 10 mg po BID, senna docusate sodium 2 tablets po HS  Side effects from treatment: None reported   Medication changes   None  Medication education  Risks side effects benefits and precautions of medications discussed with patient and he did verbalize an understanding about risks for metabolic syndrome from being on neuroleptics and is form tardive dyskinesia etc.  All medications reviewed and I recommend that they be continued for symptom management  Understanding of medications: Verbalized   Justification for dual anti-psychotics: Inability to control psychotic symptoms     Non-pharmacological treatments  Continue with individual, group, milieu and occupational therapy using recovery principles and psycho-education about accepting illness and the need for treatment.    Safety  Safety and communication plan established to target dynamic risk factors discussed above.    Discharge Plan   Aunt's house    Interval Progress   Patient reported sleeping from approximately 1 am to 8 am last night and stated he feels like he did not get good sleep. He again felt that he was unable to get into a deep sleep and felt as if the voices he hears were keeping him awake. He reported that the voices are not louder or more frequent, rather that they are just more bothersome today. He said they are unchanged specifically in volume or frequency from last week. He reported his mood is \"stable\" and that his depression symptoms are a 6/10 today due to the inability to get quality sleep " due to the threatening auditory hallucinations. He stated that sometimes walking around the unit will improve his symptoms because he is not in his head as much. He denied any anxiety. He continues to endorse a good appetite and was encouraged to continue drinking enough water. He reported once again feeling safe in the hospital but feeling nervous about leaving to go live with his aunt due to his fear of not being able to control the voices appropriately. He does report that he continues to believe this is the best option for him long term and that he would prefer to live with her instead of in a group home. He denied any suicidal or homicidal thoughts, plans, or intents. He stated that his goals for today are to stay out of his head as much as he can.  He feels much better physically apart from being tired and denied any joint or muscle pain. He reported feeling relieved that his CK levels continue to trend downward.   Acceptance by patient: Yes   Hopefulness in recovery: Hoping to live with aunt   Involved in reintegration process: Aunt   Trusting in relationship with psychiatrist: Yes  Sleep: Fair, 7 hours but reported not sleeping deeply  Appetite: Good, 100%/100%/100%  Compliance with Medications: Good  Group attendance: 8/10 groups  Significant events: None reported      Mental Status Exam  Appearance: age appropriate, overweight, wearing personal sweatshirt with restrepo up and hospital gown underneath with hospital scrub pants  Behavior: cooperative, calm, good  eye contact  Speech: normal rate, normal pitch, scant, soft  Mood: depressed  Affect:  depressed, flat, mood congruent  Thought Process: organized, linear  Thought Content: some paranoia, stating that he feels that the threatening voices are out to get him and that they are going to kill his family as well as himself when he is discharged from the hospital. He reports feeling safe in the hospital. He denied any suicidal or homicidal thoughts, plans, or  intents. No obsessions, compulsions, or distorted body perceptions verbalized. He denied any other experiences that would be categorized as grandiose, somatic, or bizarre delusions.   Perceptual Disturbances: auditory hallucinations that sound either like his ex girlfriend or a stranger to him that tell him they are going to shoot him and his family when he is discharged from the hospital. He reported they were very bothersome today but denied them being louder or more frequent than usual. He reported that he hears them every day but that they come and go throughout the day. , denies visual hallucinations when asked, does not appear responding to internal stimuli  Hx Risk Factors: chronic psychiatric problems, chronic depressive symptoms, chronic psychotic symptoms  Sensorium: alert and oriented to person, place, time and situation  Cognition: recent and remote memory grossly intact  Consciousness: alert and awake  Attention: attention span and concentration are age appropriate  Intellect: appears to be of average intelligence  Insight: limited  Judgement: impaired  Motor Activity: no abnormal movements     Vitals  Temp:  [97.4 °F (36.3 °C)-97.7 °F (36.5 °C)] 97.4 °F (36.3 °C)  HR:  [91-95] 91  Resp:  [17-18] 18  BP: (128-147)/(73-90) 147/90  SpO2:  [99 %-100 %] 99 %  No intake or output data in the 24 hours ending 10/02/24 0947    Lab Results: All Labs For Current Hospital Admission Reviewed    Current Facility-Administered Medications   Medication Dose Route Frequency Provider Last Rate    acetaminophen  650 mg Oral Q4H PRN Jordan C Holter, DO      acetaminophen  650 mg Oral Q6H PRN HOLLI Lion      aluminum-magnesium hydroxide-simethicone  30 mL Oral Q4H PRN Jordan C Holter, DO      amLODIPine  5 mg Oral Daily HOLLI Lion      ARIPiprazole  30 mg Oral Daily Bora Rosario MD      Artificial Tears  1 drop Both Eyes Q3H PRN Jordan C Holter, DO      atropine  1 drop Sublingual HS Harjeet Torres DO       haloperidol lactate  2.5 mg Intramuscular Q4H PRN Max 4/day STEVE LionNP      And    LORazepam  1 mg Intramuscular Q4H PRN Max 4/day HOLLI Lion      And    benztropine  0.5 mg Intramuscular Q4H PRN Max 4/day HOLLI Lion      benztropine  1 mg Intramuscular Q4H PRN Max 6/day Jordan C Holter, DO      haloperidol lactate  5 mg Intramuscular Q4H PRN Max 4/day HOLLI Lion      And    LORazepam  2 mg Intramuscular Q4H PRN Max 4/day HOLLI Lion      And    benztropine  1 mg Intramuscular Q4H PRN Max 4/day HOLLI Lion      benztropine  1 mg Oral Q4H PRN Max 6/day HOLLI Lion      benztropine  1 mg Oral Q4H PRN Max 6/day Jordan C Holter, DO      bisacodyl  10 mg Rectal Daily PRN HOLLI Lion      calcium carbonate  500 mg Oral BID PRN HOLLI Monge      cyanocobalamin  1,000 mcg Oral Daily HOLLI Galvan      hydrOXYzine HCL  50 mg Oral Q6H PRN Max 4/day Jordan C Holter, DO      Or    diphenhydrAMINE  50 mg Intramuscular Q6H PRN Jordan C Holter, DO      hydrOXYzine HCL  50 mg Oral Q6H PRN Max 4/day HOLLI Lion      Or    diphenhydrAMINE  50 mg Intramuscular Q6H PRN HOLLI Lion      diphenhydrAMINE-zinc acetate   Topical BID PRN HOLLI Lion      escitalopram  20 mg Oral Daily HOLLI Lion      famotidine  20 mg Oral BID HOLLI Monge      fluticasone  1 spray Each Nare Daily Brina Guillen MD      glycopyrrolate  1 mg Oral BID (AM & Afternoon) Bora Rosario MD      glycopyrrolate  2 mg Oral HS Bora Rosario MD      haloperidol  1 mg Oral Q6H PRN HOLLI Lion      haloperidol  2.5 mg Oral Q4H PRN Max 4/day HOLLI Lion      haloperidol  5 mg Oral Q4H PRN Max 4/day HOLLI Lion      hydroCHLOROthiazide  12.5 mg Oral Daily Pia Gerow-Morrissey, CRNP      hydrocortisone   Topical 4x Daily PRN HOLLI Lion      hydrOXYzine HCL  100 mg Oral Q6H PRN Max 4/day Jordan C Holter, DO      Or     LORazepam  2 mg Intramuscular Q6H PRN [facility] Holter, DO      hydrOXYzine HCL  100 mg Oral Q6H PRN Max 4/day HOLLI Lion      Or    LORazepam  2 mg Intramuscular Q6H PRN HOLLI Lion      hydrOXYzine HCL  25 mg Oral Q6H PRN Max 4/day [facility] Holter, DO      ibuprofen  600 mg Oral Q8H PRN HOLLI Lion      influenza vaccine  0.5 mL Intramuscular Once Bora Rosario MD      loratadine  10 mg Oral Daily Brina Guillen MD      melatonin  3 mg Oral HS PRN Bora Rosario MD      metFORMIN  500 mg Oral BID With Meals HOLLI Galvan      methocarbamol  500 mg Oral Q6H PRN HOLLI Lion      nicotine polacrilex  2 mg Oral Q4H PRN Bora Rosario MD      OLANZapine  5 mg Oral Q4H PRN Max 3/day [facility] Holter, DO      Or    OLANZapine  2.5 mg Intramuscular Q4H PRN Max 3/day [facility] Holter, DO      OLANZapine  5 mg Oral Q3H PRN Max 3/day [facility] Holter, DO      Or    OLANZapine  5 mg Intramuscular Q3H PRN Max 3/day [facility] Holter, DO      OLANZapine  2.5 mg Oral Q4H PRN Max 6/day [facility] Holter, DO      ondansetron  4 mg Oral Q6H PRN HOLLI Lion      pantoprazole  20 mg Oral QAM [facility] Holter, DO      polyethylene glycol  17 g Oral Daily PRN HOLLI Ghotra      propranolol  10 mg Oral Q12H KAYLIE HOLLI Lion      senna-docusate sodium  1 tablet Oral Daily PRN [facility] Holter, DO      senna-docusate sodium  2 tablet Oral HS Melvina Ambron, DO      traZODone  50 mg Oral HS PRN HOLLI Lion      white petrolatum-mineral oil   Topical TID PRN HOLLI Lion         Counseling / Coordination of Care: Total floor / unit time spent today 15 minutes. Greater than 50% of total time was spent with the patient and / or family counseling and / or somewhat receptive to supportive listening and teaching positive coping skills to deal with symptom mangement.     Patient's Rights, confidentiality and exceptions to confidentiality, use of automated medical record, Behavioral  Health Services staff access to medical record, and consent to treatment reviewed.    This note has been dictated and hence there may be problems with punctuation, spelling and formatting and if anyone has any concerns please address them to Dr. Rosario  This note is not shared with patient due to potential for making patient's condition worse by knowing the content of the note.    Eveline WALLACE

## 2024-10-02 NOTE — NURSING NOTE
"Pt is calm, cooperative and visible on unit. Pt reports depression and AH \"24/7.\" Reports voices being worse at night. Denies all other psych symptoms. Visible for meals and groups. Pt interacts appropriately with peers and reports being tired from being up late. No behavioral issues. Q 7 min checks maintained.  "

## 2024-10-03 LAB
BASOPHILS # BLD AUTO: 0.07 THOUSANDS/ÂΜL (ref 0–0.1)
BASOPHILS NFR BLD AUTO: 1 % (ref 0–1)
BNP SERPL-MCNC: 33 PG/ML (ref 0–100)
CK SERPL-CCNC: 430 U/L (ref 39–308)
CRP SERPL QL: 10.6 MG/L
EOSINOPHIL # BLD AUTO: 0.74 THOUSAND/ÂΜL (ref 0–0.61)
EOSINOPHIL NFR BLD AUTO: 7 % (ref 0–6)
ERYTHROCYTE [DISTWIDTH] IN BLOOD BY AUTOMATED COUNT: 14.3 % (ref 11.6–15.1)
HCT VFR BLD AUTO: 41.9 % (ref 36.5–49.3)
HGB BLD-MCNC: 12.6 G/DL (ref 12–17)
IMM GRANULOCYTES # BLD AUTO: 0.03 THOUSAND/UL (ref 0–0.2)
IMM GRANULOCYTES NFR BLD AUTO: 0 % (ref 0–2)
LYMPHOCYTES # BLD AUTO: 3.44 THOUSANDS/ÂΜL (ref 0.6–4.47)
LYMPHOCYTES NFR BLD AUTO: 33 % (ref 14–44)
MCH RBC QN AUTO: 23.7 PG (ref 26.8–34.3)
MCHC RBC AUTO-ENTMCNC: 30.1 G/DL (ref 31.4–37.4)
MCV RBC AUTO: 79 FL (ref 82–98)
MONOCYTES # BLD AUTO: 0.84 THOUSAND/ÂΜL (ref 0.17–1.22)
MONOCYTES NFR BLD AUTO: 8 % (ref 4–12)
NEUTROPHILS # BLD AUTO: 5.28 THOUSANDS/ÂΜL (ref 1.85–7.62)
NEUTS SEG NFR BLD AUTO: 51 % (ref 43–75)
NRBC BLD AUTO-RTO: 0 /100 WBCS
PLATELET # BLD AUTO: 241 THOUSANDS/UL (ref 149–390)
PMV BLD AUTO: 10.4 FL (ref 8.9–12.7)
RBC # BLD AUTO: 5.31 MILLION/UL (ref 3.88–5.62)
WBC # BLD AUTO: 10.4 THOUSAND/UL (ref 4.31–10.16)

## 2024-10-03 PROCEDURE — 86140 C-REACTIVE PROTEIN: CPT

## 2024-10-03 PROCEDURE — 85025 COMPLETE CBC W/AUTO DIFF WBC: CPT

## 2024-10-03 PROCEDURE — 99232 SBSQ HOSP IP/OBS MODERATE 35: CPT | Performed by: PSYCHIATRY & NEUROLOGY

## 2024-10-03 PROCEDURE — 83880 ASSAY OF NATRIURETIC PEPTIDE: CPT

## 2024-10-03 PROCEDURE — 82550 ASSAY OF CK (CPK): CPT

## 2024-10-03 RX ADMIN — GLYCOPYRROLATE 1 MG: 1 TABLET ORAL at 13:09

## 2024-10-03 RX ADMIN — GLYCOPYRROLATE 1 MG: 1 TABLET ORAL at 08:36

## 2024-10-03 RX ADMIN — PROPRANOLOL HYDROCHLORIDE 10 MG: 10 TABLET ORAL at 08:34

## 2024-10-03 RX ADMIN — METFORMIN HYDROCHLORIDE 500 MG: 500 TABLET, FILM COATED ORAL at 08:36

## 2024-10-03 RX ADMIN — METFORMIN HYDROCHLORIDE 500 MG: 500 TABLET, FILM COATED ORAL at 17:04

## 2024-10-03 RX ADMIN — ATROPINE SULFATE 1 DROP: 10 SOLUTION/ DROPS OPHTHALMIC at 21:09

## 2024-10-03 RX ADMIN — NICOTINE POLACRILEX 2 MG: 2 GUM, CHEWING ORAL at 17:04

## 2024-10-03 RX ADMIN — ESCITALOPRAM OXALATE 20 MG: 10 TABLET, FILM COATED ORAL at 08:36

## 2024-10-03 RX ADMIN — PANTOPRAZOLE SODIUM 20 MG: 20 TABLET, DELAYED RELEASE ORAL at 08:35

## 2024-10-03 RX ADMIN — AMLODIPINE BESYLATE 5 MG: 5 TABLET ORAL at 08:35

## 2024-10-03 RX ADMIN — NICOTINE POLACRILEX 2 MG: 2 GUM, CHEWING ORAL at 13:09

## 2024-10-03 RX ADMIN — CYANOCOBALAMIN TAB 1000 MCG 1000 MCG: 1000 TAB at 08:34

## 2024-10-03 RX ADMIN — ARIPIPRAZOLE 30 MG: 15 TABLET ORAL at 08:37

## 2024-10-03 RX ADMIN — SENNOSIDES AND DOCUSATE SODIUM 2 TABLET: 8.6; 5 TABLET ORAL at 21:09

## 2024-10-03 RX ADMIN — NICOTINE POLACRILEX 2 MG: 2 GUM, CHEWING ORAL at 08:34

## 2024-10-03 RX ADMIN — FAMOTIDINE 20 MG: 20 TABLET ORAL at 08:35

## 2024-10-03 RX ADMIN — TRAZODONE HYDROCHLORIDE 50 MG: 50 TABLET ORAL at 21:11

## 2024-10-03 RX ADMIN — PROPRANOLOL HYDROCHLORIDE 10 MG: 10 TABLET ORAL at 21:10

## 2024-10-03 RX ADMIN — GLYCOPYRROLATE 2 MG: 1 TABLET ORAL at 21:10

## 2024-10-03 RX ADMIN — HYDROCHLOROTHIAZIDE 12.5 MG: 12.5 TABLET ORAL at 08:35

## 2024-10-03 RX ADMIN — FAMOTIDINE 20 MG: 20 TABLET ORAL at 17:04

## 2024-10-03 RX ADMIN — MELATONIN TAB 3 MG 9 MG: 3 TAB at 21:11

## 2024-10-03 RX ADMIN — LORATADINE 10 MG: 10 TABLET ORAL at 08:35

## 2024-10-03 NOTE — PROGRESS NOTES
10/03/24 0741   Team Meeting   Meeting Type Daily Rounds   Team Members Present   Team Members Present Physician;Nurse;;Other (Discipline and Name)   Patient/Family Present   Patient Present No   Patient's Family Present No     In attendance:  Dr. Alex Thomas, MD Dr. Jordan Holter, DO Guy Taylor, RN  Luisana Alvarado, [facility]  Carla Lux, [facility]    Groups: 10/11    Pt reports some depression related to AH. Trazodone given for sleep. Pt reports AH are worse at night. No bx issues noted. Continues to wait on clarification of [facility] funding for ACT services.

## 2024-10-03 NOTE — PLAN OF CARE
Problem: Alteration in Thoughts and Perception  Goal: Verbalize thoughts and feelings  Description: Interventions:  - Promote a nonjudgmental and trusting relationship with the patient through active listening and therapeutic communication  - Assess patient's level of functioning, behavior and potential for risk  - Engage patient in 1 on 1 interactions  - Encourage patient to express fears, feelings, frustrations, and discuss symptoms    - [place] patient to reality, help patient recognize reality-based thinking   - Administer medications as ordered and assess for potential side effects  - Provide the patient education related to the signs and symptoms of the illness and desired effects of prescribed medications  Outcome: Progressing  Goal: Refrain from acting on delusional thinking/internal stimuli  Description: Interventions:  - Monitor patient closely, per order   - Utilize least restrictive measures   - Set reasonable limits, give positive feedback for acceptable   - Administer medications as ordered and monitor of potential side effects  Outcome: Progressing  Goal: Agree to be compliant with medication regime, as prescribed and report medication side effects  Description: Interventions:  - Offer appropriate PRN medication and supervise ingestion; conduct AIMS, as needed   Outcome: Progressing  Goal: Attend and participate in unit activities, including therapeutic, recreational, and educational groups  Description: Interventions:  -Encourage Visitation and family involvement in care  Outcome: Progressing  Goal: Recognize dysfunctional thoughts, communicate reality-based thoughts at the time of discharge  Description: Interventions:  - Provide medication and psycho-education to assist patient in compliance and developing insight into his/her illness   Outcome: Progressing  Goal: Complete daily ADLs, including personal hygiene independently, as able  Description: Interventions:  - Observe, teach, and assist  patient with ADLS  - Monitor and promote a balance of rest/activity, with adequate nutrition and elimination   Outcome: Progressing     Problem: Ineffective Coping  Goal: Identifies ineffective coping skills  Outcome: Progressing  Goal: Identifies healthy coping skills  Outcome: Progressing  Goal: Demonstrates healthy coping skills  Outcome: Progressing  Goal: Participates in unit activities  Description: Interventions:  - Provide therapeutic environment   - Provide required programming   - Redirect inappropriate behaviors   Outcome: Progressing     Problem: Depression  Goal: Treatment Goal: Demonstrate behavioral control of depressive symptoms, verbalize feelings of improved mood/affect, and adopt new coping skills prior to discharge  Outcome: Progressing  Goal: Verbalize thoughts and feelings  Description: Interventions:  - Assess and re-assess patient's level of risk   - Engage patient in 1:1 interactions, daily, for a minimum of 15 minutes   - Encourage patient to express feelings, fears, frustrations, hopes   Outcome: Progressing  Goal: Refrain from isolation  Description: Interventions:  - Develop a trusting relationship   - Encourage socialization   Outcome: Progressing  Goal: Refrain from self-neglect  Outcome: Progressing  Goal: Attend and participate in unit activities, including therapeutic, recreational, and educational groups  Description: Interventions:  - Provide therapeutic and educational activities daily, encourage attendance and participation, and document same in the medical record   Outcome: Progressing  Goal: Complete daily ADLs, including personal hygiene independently, as able  Description: Interventions:  - Observe, teach, and assist patient with ADLS  -  Monitor and promote a balance of rest/activity, with adequate nutrition and elimination   Outcome: Progressing     Problem: Anxiety  Goal: Anxiety is at manageable level  Description: Interventions:  - Assess and monitor patient's anxiety  level.   - Monitor for signs and symptoms (heart palpitations, chest pain, shortness of breath, headaches, nausea, feeling jumpy, restlessness, irritable, apprehensive).   - Collaborate with interdisciplinary team and initiate plan and interventions as ordered.  - [place] patient to unit/surroundings  - Explain treatment plan  - Encourage participation in care  - Encourage verbalization of concerns/fears  - Identify coping mechanisms  - Assist in developing anxiety-reducing skills  - Administer/offer alternative therapies  - Limit or eliminate stimulants  Outcome: Progressing     Problem: Alteration in Orientation  Goal: Interact with staff daily  Description: Interventions:  - Assess and re-assess patient's level of orientation  - Engage patient in 1 on 1 interactions, daily, for a minimum of 15 minutes   - Establish rapport/trust with patient   Outcome: Progressing     Problem: DISCHARGE PLANNING - CARE MANAGEMENT  Goal: Discharge to post-acute care or home with appropriate resources  Description: INTERVENTIONS:  - Conduct assessment to determine patient/family and health care team treatment goals, and need for post-acute services based on payer coverage, community resources, and patient preferences, and barriers to discharge  - Address psychosocial, clinical, and financial barriers to discharge as identified in assessment in conjunction with the patient/family and health care team  - Arrange appropriate level of post-acute services according to patient's   needs and preference and payer coverage in collaboration with the physician and health care team  - Communicate with and update the patient/family, physician, and health care team regarding progress on the discharge plan  - Arrange appropriate transportation to post-acute venues  Outcome: Progressing

## 2024-10-03 NOTE — NURSING NOTE
"Pt is visible on the milieu and social with select peers and staff. He consumed 100 % of dinner. Took his medications without incidence. Pt is pleasant, polite, and cooperative.  Flat affect, but does brighten on approach. Pt endorses \"the same\" AH and reports that he is really tired today because he had trouble sleeping for a while now. Denied all other psychiatric symptoms. Attended Community, Exercise, Coping skills, and Wrap up groups. No behavioral issues.   "

## 2024-10-03 NOTE — PROGRESS NOTES
"Progress Note - Behavioral Health   Name: Alberto Berumen 28 y.o. male I MRN: [id number]  Unit/Bed#: [facility] 108-02 I Date of Admission: 3/29/2024   Date of Service: 10/3/2024 I Hospital Day: 188     Assessment & Plan  Schizoaffective disorder, bipolar type (HCC)  Patient remains with ongoing depressive symptoms and ongoing auditory hallucinations of \"loud, angry, irritating voices.\" Remains with paranoia surrounding these voices and fears that someone is out to harm him.    At this time Clozapine has been discontinued in the setting of elevated CK. Will continue with CK monitoring and plan to draw CK level 10/3/24.    Continue Abilify 30 mg daily for psychosis  Continue escitalopram 20 mg tablet daily for depression and anxiety  Continue senna-docusate sodium two 50 mg tablets daily before bed for constipation  Chronic idiopathic constipation  Continue senna-docusate sodium two tablets before bed per medical team    Plan:  Continue previously prescribed psychotropic medication regimen:  Currently on 201 commitment status  Continue to promote patient participation in therapeutic milieu.  Continue medical management per medicine.  Continue behavioral health checks q.7 minutes.   Continue vitals per behavioral health unit protocol.  Safety: Safety and communication plan established to target dynamic risk factors discussed above.   Discharge and disposition: At this time the plan is for the patient to be discharged to his aunt's house following clinical stabilization.    SUBJECTIVE     Patient evaluated this a.m. for continuity of care. Patient was discussed at length with nursing and treatment team. Per nursing, on the inpatient unit Alberto has been making slow progress.  On the inpatient unit he continues to remain with ongoing symptoms of psychosis.  Yesterday he was visible in the milieu, social with select peers and staff.  He was noted to be quiet and cooperative, with a blunted affect.  On evening nursing " "assessment he reported his ongoing struggles with auditory hallucinations and reported the hallucinations were \"the same.\"  He reported ongoing difficulties with sleep.  He received as needed trazodone 50 mg at around 930 PM.  Per nursing report patient has been medication and meal compliant.    Today, Alberto reports feeling \"frustrated.\"  He states that he continues to experience auditory hallucinations \"all the time\" on the inpatient unit.  He reports ongoing auditory hallucinations of \"loud, angry, irritating\" voices.  He remains with paranoia surrounding these voices and states that they have threatened to kill him. He feels safe on the inpatient unit.    At the time of interview, Alberto reports that his biggest struggle at the present moment is his sleep.  He states that he did not sleep well at all last night and states that he got less than 3 hours of sleep.  He reports in this context he feels tired today and he reports the voices are worse in severity today.    At the time of interview, the plan was discussed for ongoing CK monitoring and he is in agreement with this plan.    Patient reports he has been spending his time on the unit attending groups and walking in the halls during his free time.    At this time, patient reports he has been eating well.  He denies medication side effects.    Patient denies suicidal ideation, homicidal ideation.  He denies visual hallucinations. He appears to have some internal preoccupation.    PSYCHIATRIC REVIEW OF SYSTEMS     Behavior over the last 24 hours:  unchanged  Sleep: insomnia  Appetite: normal  Medication side effects: No    Progress Toward Goals: Slow progress, as evidenced by their participation in individual, social and therapeutic milieu in addition to adherence to their medication regimen.  Patient Acceptance: Fair  Patient Understanding: Limited  Patient Involvement in Reintegration Process: Patient is in contact with his aunt   Patient's Therapeutic " Relationship with Psychiatrist: Trusting  Patient's Optimism Regarding Recovery: Hopeful for discharge to his aunt's house following the coordination of ACT services  Group Attendance: 10 out of 11    REVIEW OF SYSTEMS   Review of systems: patient reports his generalized muscle pains have been improving over the past day    OBJECTIVE     Vital Signs in Past 24 Hours:  Temp:  [97.5 °F (36.4 °C)-97.6 °F (36.4 °C)] 97.6 °F (36.4 °C)  HR:  [78-94] 84  BP: ()/(51-81) 126/75  Resp:  [18] 18  SpO2:  [97 %-100 %] 98 %  O2 Device: None (Room air)    Intake/Output in Past 24 hours:  No intake/output data recorded.  No intake/output data recorded.        Laboratory Results:  I have personally reviewed all pertinent laboratory/tests results.  Most Recent Labs:   Lab Results   Component Value Date    WBC 10.23 (H) 10/01/2024    RBC 5.96 (H) 10/01/2024    HGB 14.2 10/01/2024    HCT 46.9 10/01/2024     10/01/2024    RDW 14.7 10/01/2024    NEUTROABS 5.86 10/01/2024    SODIUM 139 10/01/2024    K 3.7 10/01/2024     10/01/2024    CO2 28 10/01/2024    BUN 10 10/01/2024    CREATININE 0.98 10/01/2024    GLUC 111 10/01/2024    GLUF 83 09/30/2024    CALCIUM 10.3 (H) 10/01/2024    AST 26 09/30/2024    ALT 42 09/30/2024    ALKPHOS 64 09/30/2024    TP 6.6 09/30/2024    ALB 3.8 09/30/2024    TBILI 0.47 09/30/2024    CHOLESTEROL 156 03/14/2024    HDL 44 03/14/2024    TRIG 133 03/14/2024    LDLCALC 85 03/14/2024    NONHDLC 112 03/14/2024    LITHIUM 0.61 01/09/2024    [id number] 1.062 11/15/2023    HGBA1C 5.0 04/01/2024    EAG 97 04/01/2024       Behavioral Health Medications: all current active meds have been reviewed and continue current psychiatric medications.    Current Facility-Administered Medications   Medication Dose Route Frequency Provider Last Rate    acetaminophen  650 mg Oral Q4H PRN Jordan C Holter, DO      acetaminophen  650 mg Oral Q6H PRN HOLLI Lion      aluminum-magnesium hydroxide-simethicone  30  mL Oral Q4H PRN Jordan C Holter, DO      amLODIPine  5 mg Oral Daily Eveline Hunt CRNP      ARIPiprazole  30 mg Oral Daily Bora Rosario MD      Artificial Tears  1 drop Both Eyes Q3H PRN Jordan C Holter, DO      atropine  1 drop Sublingual HS Harjeet Torres, DO      haloperidol lactate  2.5 mg Intramuscular Q4H PRN Max 4/day STEVE LionNP      And    LORazepam  1 mg Intramuscular Q4H PRN Max 4/day STEVE LionNP      And    benztropine  0.5 mg Intramuscular Q4H PRN Max 4/day Eveline Hunt CRNP      benztropine  1 mg Intramuscular Q4H PRN Max 6/day Jordan C Holter, DO      haloperidol lactate  5 mg Intramuscular Q4H PRN Max 4/day STEVE LionNP      And    LORazepam  2 mg Intramuscular Q4H PRN Max 4/day STEVE LionNP      And    benztropine  1 mg Intramuscular Q4H PRN Max 4/day HOLLI Lion      benztropine  1 mg Oral Q4H PRN Max 6/day HOLLI Lion      benztropine  1 mg Oral Q4H PRN Max 6/day Jordan C Holter, DO      bisacodyl  10 mg Rectal Daily PRN HOLLI Lion      calcium carbonate  500 mg Oral BID PRN HOLLI Monge      cyanocobalamin  1,000 mcg Oral Daily HOLLI Galvan      hydrOXYzine HCL  50 mg Oral Q6H PRN Max 4/day Jordan C Holter, DO      Or    diphenhydrAMINE  50 mg Intramuscular Q6H PRN Jordan C Holter, DO      hydrOXYzine HCL  50 mg Oral Q6H PRN Max 4/day HOLLI Lion      Or    diphenhydrAMINE  50 mg Intramuscular Q6H PRN HOLLI Lion      diphenhydrAMINE-zinc acetate   Topical BID PRN HOLLI Lion      escitalopram  20 mg Oral Daily HOLLI Lion      famotidine  20 mg Oral BID HOLLI Monge      fluticasone  1 spray Each Nare Daily Brina Guillen MD      glycopyrrolate  1 mg Oral BID (AM & Afternoon) Bora Rosario MD      glycopyrrolate  2 mg Oral HS Bora Rosario MD      haloperidol  1 mg Oral Q6H PRN HOLLI Lion      haloperidol  2.5 mg Oral Q4H PRN Max 4/day HOLLI Lion       haloperidol  5 mg Oral Q4H PRN Max 4/day HOLLI Lion      hydroCHLOROthiazide  12.5 mg Oral Daily HOLLI Galvan      hydrocortisone   Topical 4x Daily PRN HOLLI Lion      hydrOXYzine HCL  100 mg Oral Q6H PRN Max 4/day [facility] Holter, DO      Or    LORazepam  2 mg Intramuscular Q6H PRN [facility] Holter, DO      hydrOXYzine HCL  100 mg Oral Q6H PRN Max 4/day HOLLI Lion      Or    LORazepam  2 mg Intramuscular Q6H PRN HOLLI Lion      hydrOXYzine HCL  25 mg Oral Q6H PRN Max 4/day [facility] Holter, DO      ibuprofen  600 mg Oral Q8H PRN HOLLI Lion      influenza vaccine  0.5 mL Intramuscular Once Bora Rosario MD      loratadine  10 mg Oral Daily Brina Guillen MD      melatonin  3 mg Oral HS PRN Bora Rosario MD      metFORMIN  500 mg Oral BID With Meals HOLLI Galvan      methocarbamol  500 mg Oral Q6H PRN HOLLI Lion      nicotine polacrilex  2 mg Oral Q4H PRN Bora Rosario MD      OLANZapine  5 mg Oral Q4H PRN Max 3/day [facility] Holter, DO      Or    OLANZapine  2.5 mg Intramuscular Q4H PRN Max 3/day [facility] Holter, DO      OLANZapine  5 mg Oral Q3H PRN Max 3/day [facility] Holter, DO      Or    OLANZapine  5 mg Intramuscular Q3H PRN Max 3/day [facility] Holter, DO      OLANZapine  2.5 mg Oral Q4H PRN Max 6/day [facility] Holter, DO      ondansetron  4 mg Oral Q6H PRN HOLLI Lion      pantoprazole  20 mg Oral QAM [facility] Holter, DO      polyethylene glycol  17 g Oral Daily PRN HOLLI Ghotra      propranolol  10 mg Oral Q12H KAYLIE HOLLI Lion      senna-docusate sodium  1 tablet Oral Daily PRN [facility] Holter, DO      senna-docusate sodium  2 tablet Oral HS Melvina Ambron, DO      traZODone  50 mg Oral HS PRN HOLLI Lion      white petrolatum-mineral oil   Topical TID PRN HOLLI Lion         Risks, benefits and possible side effects of Medications:   Risks, benefits, and possible side effects of medications explained to  "patient and patient verbalizes understanding.      Dual Antipsychotic Rationale: Not applicable at present moment    Mental Status Evaluation:  Appearance:  Age-appropriate, overweight, casually dressed, fair grooming and hygiene, limited eye contact, no acute distress   Behavior:  Polite, cooperative   Speech:  Normal rate, soft, fluent, clear, coherent   Mood:  \"Frustrated\"   Affect:  Blunted, depressed   Thought Process:  [place], linear, goal directed   Associations: [place] associations   Thought Content:  Remains with some paranoia in the context of ongoing struggles with auditory hallucinations, stating these voices are out to kill him   Perceptual Disturbances: Reports auditory hallucinations that threaten violence against him.  He describes the voices as \"loud, angry, irritating,\" denies visual hallucinations, appears internally preoccupied   Risk Potential: Suicidal ideation - denies  Homicidal ideation - denies  Potential for aggression - No   Sensorium:  oriented to person, place, and time/date   Memory:  recent and remote memory grossly intact   Consciousness:  alert and awake   Attention/Concentration: attention span and concentration are age appropriate   Insight:  limited   Judgment: limited   Gait/Station: normal gait/station, normal balance   Motor Activity: no abnormal movements     Recommended Treatment:   See above for assessment and plan.    Inpatient Psychiatric Certification:  Patient is not at goal. They are not yet ready for discharge. The patient's condition currently requires active psychopharmacological medication management, interdisciplinary coordination with case management, and the utilization of adjunctive milieu and group therapy to augment psychopharmacological efficacy. The patient's risk of morbidity, and progression or decompensation of psychiatric disease, is higher without this current treatment. Based upon physical, mental and social evaluations, I certify that inpatient " psychiatric services are medically necessary for this patient for a duration of greater than 2 midnights for the treatment of Schizoaffective disorder, bipolar type (HCC)  Available alternative community resources do not meet the patient's mental health care needs.  I further attest that an established written individualized plan of care has been implemented and is outlined in the patient's medical records.    Counseling/Coordination of Care    Total unit time spent today was greater than 15 minutes. Greater than 50% of total time was spent with the patient and/or patient's relatives and/or coordination of patient's care.     Patient's rights, confidentiality, exceptions to confidentiality, use of electronic medical record including appropriate staff access to medical record regarding behavioral health services and consent to treatment were reviewed.    Note Share:     This note was not shared with the patient due to reasonable likelihood of causing patient harm     Hardik So MD   Psychiatry, PGY-4

## 2024-10-03 NOTE — NURSING NOTE
Pt is calm, cooperative and visible on unit. Pt reports depression and AH that keeps him up at night; denies SI/HI/VH. Compliant with medications and meals. Social with staff and select peers. Encouraged pt to discuss sleep concerns with provider. Q 7 min checks maintained.

## 2024-10-03 NOTE — ASSESSMENT & PLAN NOTE
"Patient remains with ongoing depressive symptoms and ongoing auditory hallucinations of \"loud, angry, irritating voices.\" Remains with paranoia surrounding these voices and fears that someone is out to harm him.    At this time Clozapine has been discontinued in the setting of elevated CK. Will continue with CK monitoring and plan to draw CK level 10/3/24.    Continue Abilify 30 mg daily for psychosis  Continue escitalopram 20 mg tablet daily for depression and anxiety  Continue senna-docusate sodium two 50 mg tablets daily before bed for constipation  "

## 2024-10-04 PROCEDURE — 99232 SBSQ HOSP IP/OBS MODERATE 35: CPT | Performed by: PSYCHIATRY & NEUROLOGY

## 2024-10-04 RX ADMIN — HYDROCHLOROTHIAZIDE 12.5 MG: 12.5 TABLET ORAL at 08:32

## 2024-10-04 RX ADMIN — PANTOPRAZOLE SODIUM 20 MG: 20 TABLET, DELAYED RELEASE ORAL at 08:34

## 2024-10-04 RX ADMIN — GLYCOPYRROLATE 2 MG: 1 TABLET ORAL at 21:48

## 2024-10-04 RX ADMIN — FLUTICASONE PROPIONATE 1 SPRAY: 50 SPRAY, METERED NASAL at 08:36

## 2024-10-04 RX ADMIN — NICOTINE POLACRILEX 2 MG: 2 GUM, CHEWING ORAL at 16:51

## 2024-10-04 RX ADMIN — LORATADINE 10 MG: 10 TABLET ORAL at 08:32

## 2024-10-04 RX ADMIN — METFORMIN HYDROCHLORIDE 500 MG: 500 TABLET, FILM COATED ORAL at 08:34

## 2024-10-04 RX ADMIN — SENNOSIDES AND DOCUSATE SODIUM 2 TABLET: 8.6; 5 TABLET ORAL at 21:48

## 2024-10-04 RX ADMIN — GLYCOPYRROLATE 1 MG: 1 TABLET ORAL at 08:35

## 2024-10-04 RX ADMIN — FAMOTIDINE 20 MG: 20 TABLET ORAL at 17:09

## 2024-10-04 RX ADMIN — CYANOCOBALAMIN TAB 1000 MCG 1000 MCG: 1000 TAB at 08:34

## 2024-10-04 RX ADMIN — NICOTINE POLACRILEX 2 MG: 2 GUM, CHEWING ORAL at 12:28

## 2024-10-04 RX ADMIN — FAMOTIDINE 20 MG: 20 TABLET ORAL at 08:34

## 2024-10-04 RX ADMIN — PROPRANOLOL HYDROCHLORIDE 10 MG: 10 TABLET ORAL at 21:48

## 2024-10-04 RX ADMIN — MELATONIN TAB 3 MG 9 MG: 3 TAB at 21:47

## 2024-10-04 RX ADMIN — ESCITALOPRAM OXALATE 20 MG: 10 TABLET, FILM COATED ORAL at 08:33

## 2024-10-04 RX ADMIN — METFORMIN HYDROCHLORIDE 500 MG: 500 TABLET, FILM COATED ORAL at 16:51

## 2024-10-04 RX ADMIN — PROPRANOLOL HYDROCHLORIDE 10 MG: 10 TABLET ORAL at 08:34

## 2024-10-04 RX ADMIN — GLYCOPYRROLATE 1 MG: 1 TABLET ORAL at 13:00

## 2024-10-04 RX ADMIN — NICOTINE POLACRILEX 2 MG: 2 GUM, CHEWING ORAL at 08:36

## 2024-10-04 RX ADMIN — ARIPIPRAZOLE 30 MG: 15 TABLET ORAL at 08:35

## 2024-10-04 NOTE — PROGRESS NOTES
10/04/24 0745   Team Meeting   Meeting Type Daily Rounds   Team Members Present   Team Members Present Physician;Nurse;;Other (Discipline and Name)   Patient/Family Present   Patient Present No   Patient's Family Present No     In attendance:  Dr. Alex Thomas, MD Dr. Jordan Holter, DO Guy Taylor, RN  Luisana Alvarado, [facility]  Carla Lux, [facility]  MATILDA Daniel.S.    Groups: 7/10    Pt had labs completed, CK now almost WNL. Pt reports difficulty sleeping. Melatonin added as scheduled. Pt took Melatonin and Trazodone in order to sleep and reportedly wants to stop the Clozaril altogether. Pt waiting on [facility] funding for ACT determination.

## 2024-10-04 NOTE — PLAN OF CARE
Problem: Alteration in Thoughts and Perception  Goal: Treatment Goal: Gain control of psychotic behaviors/thinking, reduce/eliminate presenting symptoms and demonstrate improved reality functioning upon discharge  Outcome: Progressing  Goal: Refrain from acting on delusional thinking/internal stimuli  Description: Interventions:  - Monitor patient closely, per order   - Utilize least restrictive measures   - Set reasonable limits, give positive feedback for acceptable   - Administer medications as ordered and monitor of potential side effects  Outcome: Progressing  Goal: Agree to be compliant with medication regime, as prescribed and report medication side effects  Description: Interventions:  - Offer appropriate PRN medication and supervise ingestion; conduct AIMS, as needed   Outcome: Progressing  Goal: Attend and participate in unit activities, including therapeutic, recreational, and educational groups  Description: Interventions:  -Encourage Visitation and family involvement in care  Outcome: Progressing  Goal: Recognize dysfunctional thoughts, communicate reality-based thoughts at the time of discharge  Description: Interventions:  - Provide medication and psycho-education to assist patient in compliance and developing insight into his/her illness   Outcome: Progressing  Goal: Complete daily ADLs, including personal hygiene independently, as able  Description: Interventions:  - Observe, teach, and assist patient with ADLS  - Monitor and promote a balance of rest/activity, with adequate nutrition and elimination   Outcome: Progressing     Problem: Ineffective Coping  Goal: Identifies ineffective coping skills  Outcome: Progressing  Goal: Identifies healthy coping skills  Outcome: Progressing  Goal: Demonstrates healthy coping skills  Outcome: Progressing     Problem: Depression  Goal: Treatment Goal: Demonstrate behavioral control of depressive symptoms, verbalize feelings of improved mood/affect, and adopt  new coping skills prior to discharge  Outcome: Progressing  Goal: Refrain from isolation  Description: Interventions:  - Develop a trusting relationship   - Encourage socialization   Outcome: Progressing     Problem: Anxiety  Goal: Anxiety is at manageable level  Description: Interventions:  - Assess and monitor patient's anxiety level.   - Monitor for signs and symptoms (heart palpitations, chest pain, shortness of breath, headaches, nausea, feeling jumpy, restlessness, irritable, apprehensive).   - Collaborate with interdisciplinary team and initiate plan and interventions as ordered.  - [place] patient to unit/surroundings  - Explain treatment plan  - Encourage participation in care  - Encourage verbalization of concerns/fears  - Identify coping mechanisms  - Assist in developing anxiety-reducing skills  - Administer/offer alternative therapies  - Limit or eliminate stimulants  Outcome: Progressing     Problem: Alteration in Orientation  Goal: Interact with staff daily  Description: Interventions:  - Assess and re-assess patient's level of orientation  - Engage patient in 1 on 1 interactions, daily, for a minimum of 15 minutes   - Establish rapport/trust with patient   Outcome: Progressing     Problem: DISCHARGE PLANNING - CARE MANAGEMENT  Goal: Discharge to post-acute care or home with appropriate resources  Description: INTERVENTIONS:  - Conduct assessment to determine patient/family and health care team treatment goals, and need for post-acute services based on payer coverage, community resources, and patient preferences, and barriers to discharge  - Address psychosocial, clinical, and financial barriers to discharge as identified in assessment in conjunction with the patient/family and health care team  - Arrange appropriate level of post-acute services according to patient's   needs and preference and payer coverage in collaboration with the physician and health care team  - Communicate with and update the  patient/family, physician, and health care team regarding progress on the discharge plan  - Arrange appropriate transportation to post-acute venues  Outcome: Progressing

## 2024-10-04 NOTE — SOCIAL WORK
"SW met 1:1 with pt  Pt reports feeling \"the worst\" that he has felt since arriving to the [facility]. Pt reports ongoing voices that are intensifying in volume and frequency, as well as significantly increased depression and sadness. SW and pt processed and discussed strategies for occupying his mind as well as the importance of continued communication with his tx team. SW offered to set up a virtual visit with a family member; pt declined, stating he is too sad to want to talk to anyone.   Pt able to smile when joked with but otherwise presents as moderately depressive.   "

## 2024-10-04 NOTE — NURSING NOTE
Pt is calm, cooperative and visible on unit. Pt reports AH, depression and anxiety; denies SI/HI/VH. Pt reports worsening depression since Clozapine was discontinued. Encouraged pt to discuss symptoms with provider. Compliant with medications and meals. Social with staff and peers. Q 7 min checks maintained.

## 2024-10-04 NOTE — PROGRESS NOTES
Progress Note - Behavioral Health   Name: Alberto Berumen 28 y.o. male I MRN: [id number]  Unit/Bed#: [facility] 108-02 I Date of Admission: 3/29/2024   Date of Service: 10/4/2024 I Hospital Day: 189     Assessment & Plan  Schizoaffective disorder, bipolar type (HCC)  Continues to hear the same threatening voices which have not gotten any better or worse.  Clozapine has been discontinued since CK was elevated toward 2000 which has now come down to 430 as of yesterday and he chooses not to go back on Ativan 1 to stay only on the Abilify.    Continue Abilify 30 mg daily for psychosis  Continue escitalopram 20 mg tablet daily for depression and anxiety  Continue senna-docusate sodium two 50 mg tablets daily before bed for constipation  Chronic idiopathic constipation  Continue senna-docusate sodium two tablets before bed per medical team      Patient Active Problem List   Diagnosis    GERD (gastroesophageal reflux disease)    Medical clearance for psychiatric admission    Schizoaffective disorder, bipolar type (HCC)    Tobacco abuse    T wave inversion in EKG    Syringoma    Chronic idiopathic constipation    Vitamin B 12 deficiency    Vitamin D deficiency    Confluent and reticulate papillomatosis    Class 2 obesity in adult    Primary hypertension    Elevated hemoglobin A1c    Bilateral lower extremity edema     Review of systems: Unremarkable CK has come down to 430 and being encouraged to drink a lot of fluids   assessment  Overall Status: Reports same threatening voices to kill him but feels he is ready to be discharged home to his aunt but since he lost his medical assistance he may not be eligible for ACT team and currently has to look into other programs that may be available for him.  Not aggressive or agitated or threatening or self-abusive with no need for behavioral PRNs  certification Statement: The patient will continue to require additional inpatient hospital stay due to ongoing voices that are threatening  to mixing lack of response to medications and ECT so far.     Medications:  propranolol 10 mg every 12 hours as as needed for anxiety, Abilify 30 mg mg at bedtime Lexapro 20 mg once a day,  and senna 2 tablets twice a day for constipation  All medications reviewed with patient and recommend they be continued for symptom management  side effects from treatment: None reported   Medication changes   None today  Medication education   Risks side effects benefits and precautions of medications discussed with patient and he did verbalize an understanding about risks for metabolic syndrome from being on neuroleptics and risk for tardive dyskinesia etc.    Understanding of medications: Has some understanding   Justification for dual anti-psychotics: Not applicable    Non-pharmacological treatments  Continue with individual, group, milieu and occupational therapy using recovery principles and psycho-education about accepting illness and the need for treatment.   to contact aunt and explore ACT team referral which he never had  ECT to be continued  q 2 weekly for maintenance x 6  Refuses to see a dietician and agrees to do more exercises as he is about 100 lbs overweight   Prolactin level high so Risperdal replaced with Abilify which he has tolerated well so far with prolactin level coming back to normal    Safety  Safety and communication plan established to target dynamic risk factors discussed above.    Discharge Plan back to his aunt but may not be eligible for ACT team since he lost his MA benefits  Interval Progress   Note planned to go back to his aunt's house who has agreed to take him back.  Continues to report same threatening voices to kill him and his family which he has been hearing over 2 years which has not gotten any worse or better.  This has caused him some sadness but not suicidality and voices have not been commanding.  He is not observed to cross boundaries with a female peer.  He has not  needed any behavioral PRNs and is eating well and sleeping well and is more alert since taken off the clozapine because of increasing CK which has come down to 430 now and he is being encouraged to drink plenty of fluids.  Continues to stay to himself but verbalizations are increased and not sleepy or withdrawn.  No current suicidal or homicidal thoughts and no plans verbalized.  No behavioral PRNs needed.  Does not endorse any self-abusive or suicidal thoughts    acceptance by patient: Hoping to live with his aunt again but accepting need for hospitalization  Hopefulness in recovery: Living with his aunt  Involved in reintegration process: Talking and meeting with his aunt and his sister is from [place]  trusting in relationship with psychiatrist: Trusting  Sleep: better with melatonin and trazodone   Appetite: Good  Compliance with Medications: Good  Group attendance: Most of the groups  7/10  Significant events and progress towards goals: Status quo    Mental Status Exam  Appearance: age appropriate, improved grooming, looks older than stated age, overweight, with hair in dreadlocks casually dressed with a clean shaven face, fairly groomed with good eye contact casually dressed walking around the unit eating his breakfast looking forward to getting out soon but does not feel emotionally well since Clozapine is discontinued and does not wish to go back on it as he is afraid   behavior: Friendly cooperative better groomed  speech: normal rate, normal volume, normal pitch  Mood: dysphoric, anxious, continues to report feeling good with the ECT   affect: constricted, inappropriate, mood-congruent.  Somewhat constricted with minimal mood reactivity   thought Process: organized, logical, coherent, goal directed, linear, decreased rate of thoughts, slowing of thoughts, negative thinking, impaired abstract reasoning, concrete  Thought Content: paranoid ideation, some paranoia, grandiose ideas, intrusive thoughts,  preoccupied, chronic, continues to report paranoia about people threatening to kill him and his family because of the voices.  He believes ECT has helped so that he is not much in his head.  No other delusions elicited.  No current suicidal or homicidal thoughts and no plans verbalized but today reports having passive death wishes because of voices with no plans and able to CFS and it has not changed yet  .  No phobias obsessions compulsions or distorted body perceptions reported.  Preoccupied with wanting to get ECTs more often despite reminding him that it may impair his memory and finally he agreed to such as it is  every 2 weeks  Perceptual Disturbances: Reports same voices that threaten to kill him and his family and he believes they are real and does not believe it is part of his illness and does not appear responding  Hx Risk Factors: chronic psychiatric problems, chronic anxiety symptoms, chronic psychotic symptoms,    Sensorium: Oriented x 3 spheres and situation  cognition: recent and remote memory grossly intact  Consciousness: alert and awake  Attention: Attention and concentration good  Intellect: appears to be of average intelligence  Insight: intact  Judgement: intact  Motor Activity: no abnormal movements     Vitals  Temp:  [97.5 °F (36.4 °C)-97.8 °F (36.6 °C)] 97.5 °F (36.4 °C)  HR:  [70-94] 94  Resp:  [18] 18  BP: (116-133)/(58-88) 116/75  SpO2:  [99 %-100 %] 100 %  No intake or output data in the 24 hours ending 10/04/24 0750      Lab Results: All labs reviewed and prolactin level came down to normal on 8/1/2024, clozapine level 820 on 9/5/24  Current Facility-Administered Medications   Medication Dose Route Frequency Provider Last Rate    acetaminophen  650 mg Oral Q4H PRN Jordan C Holter, DO      acetaminophen  650 mg Oral Q6H PRN HOLLI Lion      aluminum-magnesium hydroxide-simethicone  30 mL Oral Q4H PRN Jordan C Holter, DO      amLODIPine  5 mg Oral Daily HOLLI Lion       ARIPiprazole  30 mg Oral Daily Bora Rosario MD      Artificial Tears  1 drop Both Eyes Q3H PRN Jordan C Holter, DO      atropine  1 drop Sublingual HS Harjeet Torres,       haloperidol lactate  2.5 mg Intramuscular Q4H PRN Max 4/day STEVE LionNP      And    LORazepam  1 mg Intramuscular Q4H PRN Max 4/day STEVE LionNP      And    benztropine  0.5 mg Intramuscular Q4H PRN Max 4/day STEVE LionNP      benztropine  1 mg Intramuscular Q4H PRN Max 6/day Jordan C Holter, DO      haloperidol lactate  5 mg Intramuscular Q4H PRN Max 4/day STEVE LionNP      And    LORazepam  2 mg Intramuscular Q4H PRN Max 4/day HOLLI Lion      And    benztropine  1 mg Intramuscular Q4H PRN Max 4/day HOLLI Lion      benztropine  1 mg Oral Q4H PRN Max 6/day HOLLI Lion      benztropine  1 mg Oral Q4H PRN Max 6/day Jordan C Holter, DO      bisacodyl  10 mg Rectal Daily PRN HOLLI Lion      calcium carbonate  500 mg Oral BID PRN HOLLI Monge      cyanocobalamin  1,000 mcg Oral Daily HOLLI Galvan      hydrOXYzine HCL  50 mg Oral Q6H PRN Max 4/day Jordan C Holter, DO      Or    diphenhydrAMINE  50 mg Intramuscular Q6H PRN Jordan C Holter, DO      hydrOXYzine HCL  50 mg Oral Q6H PRN Max 4/day HOLLI Lion      Or    diphenhydrAMINE  50 mg Intramuscular Q6H PRN HOLLI Lion      diphenhydrAMINE-zinc acetate   Topical BID PRN HOLLI Lion      escitalopram  20 mg Oral Daily HOLLI Lion      famotidine  20 mg Oral BID HOLLI Monge      fluticasone  1 spray Each Nare Daily Brina Guillen MD      glycopyrrolate  1 mg Oral BID (AM & Afternoon) Bora Rosario MD      glycopyrrolate  2 mg Oral HS Bora Rosario MD      haloperidol  1 mg Oral Q6H PRN HOLLI Lion      haloperidol  2.5 mg Oral Q4H PRN Max 4/day HOLLI Lion      haloperidol  5 mg Oral Q4H PRN Max 4/day HOLLI Lion      hydroCHLOROthiazide  12.5 mg Oral  Daily HOLLI Galvan      hydrocortisone   Topical 4x Daily PRN HOLLI Lion      hydrOXYzine HCL  100 mg Oral Q6H PRN Max 4/day [facility] Holter, DO      Or    LORazepam  2 mg Intramuscular Q6H PRN [facility] Holter, DO      hydrOXYzine HCL  100 mg Oral Q6H PRN Max 4/day HOLLI Lion      Or    LORazepam  2 mg Intramuscular Q6H PRN HOLLI Lion      hydrOXYzine HCL  25 mg Oral Q6H PRN Max 4/day [facility] Holter, DO      ibuprofen  600 mg Oral Q8H PRN HOLLI Lion      influenza vaccine  0.5 mL Intramuscular Once Bora Rosario MD      loratadine  10 mg Oral Daily Brina Guillen MD      melatonin  3 mg Oral HS PRN Bora Rosario MD      melatonin  9 mg Oral HS Bora Rosario MD      metFORMIN  500 mg Oral BID With Meals HOLLI Galvan      methocarbamol  500 mg Oral Q6H PRN HOLLI Lion      nicotine polacrilex  2 mg Oral Q4H PRN Bora Rosario MD      OLANZapine  5 mg Oral Q4H PRN Max 3/day [facility] Holter, DO      Or    OLANZapine  2.5 mg Intramuscular Q4H PRN Max 3/day [facility] Holter, DO      OLANZapine  5 mg Oral Q3H PRN Max 3/day [facility] Holter, DO      Or    OLANZapine  5 mg Intramuscular Q3H PRN Max 3/day [facility] Holter, DO      OLANZapine  2.5 mg Oral Q4H PRN Max 6/day [facility] Holter, DO      ondansetron  4 mg Oral Q6H PRN HOLLI Lion      pantoprazole  20 mg Oral QAM [facility] Holter, DO      polyethylene glycol  17 g Oral Daily PRN HOLLI Ghotra      propranolol  10 mg Oral Q12H KAYLIE HOLLI Lion      senna-docusate sodium  1 tablet Oral Daily PRN [facility] Holter, DO      senna-docusate sodium  2 tablet Oral HS Melvina Ambron, DO      traZODone  50 mg Oral HS PRN HOLLI Lion      white petrolatum-mineral oil   Topical TID PRN HOLLI Lion         Counseling / Coordination of Care: Total floor / unit time spent today 15 minutes. Greater than 50% of total time was spent with the patient and / or family counseling and / or somewhat  receptive to supportive listening and teaching positive coping skills to deal with symptom mangement.     Patient's Rights, confidentiality and exceptions to confidentiality, use of automated medical record, Behavioral Health Services staff access to medical record, and consent to treatment reviewed.    This note has been dictated and hence there may be problems with punctuation, spelling and formatting and if anyone has any concerns please address them to Dr. Rosario   This note is not shared with patient due to potential for making patient's condition worse by knowing the content of the note.

## 2024-10-04 NOTE — NURSING NOTE
Alberto was in bed more this evening than usual. His roommate was in bed and I'm not sure if that had anything to do with it. I asked him if he was okay and he said yes  He did report AH, anxiety and depression and he had endorsed the same on the day shift as well Again, I mentioned the importance of distraction for coping with AH and anxiety but I have mentioned this at least five times so he is probably tired of hearing about it and I don't believe he uses this technique  He was in bed since after snack; he was asleep and had to be awakened for HS medications

## 2024-10-04 NOTE — NURSING NOTE
Pt is present on the milieu and social with select peer. He consumed 100 % of dinner. Took his medications without incidence. Pt endorse AH of voices saying they will hurt him and his family. Pt c/o insomnia the last 3 nights. Pt has N.O. for melatonin 9 mg @ bedtime. Pt is calm, polite, and cooperative. Attended Community and coping skills group. No unmet needs. No behavioral issues.

## 2024-10-05 PROCEDURE — 99232 SBSQ HOSP IP/OBS MODERATE 35: CPT | Performed by: PHYSICIAN ASSISTANT

## 2024-10-05 RX ADMIN — CYANOCOBALAMIN TAB 1000 MCG 1000 MCG: 1000 TAB at 08:53

## 2024-10-05 RX ADMIN — PANTOPRAZOLE SODIUM 20 MG: 20 TABLET, DELAYED RELEASE ORAL at 08:53

## 2024-10-05 RX ADMIN — MELATONIN TAB 3 MG 9 MG: 3 TAB at 21:19

## 2024-10-05 RX ADMIN — FAMOTIDINE 20 MG: 20 TABLET ORAL at 08:53

## 2024-10-05 RX ADMIN — PROPRANOLOL HYDROCHLORIDE 10 MG: 10 TABLET ORAL at 21:19

## 2024-10-05 RX ADMIN — SENNOSIDES AND DOCUSATE SODIUM 2 TABLET: 8.6; 5 TABLET ORAL at 21:19

## 2024-10-05 RX ADMIN — FAMOTIDINE 20 MG: 20 TABLET ORAL at 17:18

## 2024-10-05 RX ADMIN — LORATADINE 10 MG: 10 TABLET ORAL at 09:00

## 2024-10-05 RX ADMIN — GLYCOPYRROLATE 1 MG: 1 TABLET ORAL at 08:53

## 2024-10-05 RX ADMIN — HYDROXYZINE HYDROCHLORIDE 100 MG: 50 TABLET, FILM COATED ORAL at 09:53

## 2024-10-05 RX ADMIN — FLUTICASONE PROPIONATE 1 SPRAY: 50 SPRAY, METERED NASAL at 09:00

## 2024-10-05 RX ADMIN — METFORMIN HYDROCHLORIDE 500 MG: 500 TABLET, FILM COATED ORAL at 08:53

## 2024-10-05 RX ADMIN — ESCITALOPRAM OXALATE 20 MG: 10 TABLET, FILM COATED ORAL at 08:52

## 2024-10-05 RX ADMIN — GLYCOPYRROLATE 2 MG: 1 TABLET ORAL at 21:19

## 2024-10-05 RX ADMIN — ARIPIPRAZOLE 30 MG: 15 TABLET ORAL at 08:53

## 2024-10-05 RX ADMIN — HYDROCHLOROTHIAZIDE 12.5 MG: 12.5 TABLET ORAL at 08:52

## 2024-10-05 RX ADMIN — AMLODIPINE BESYLATE 5 MG: 5 TABLET ORAL at 08:53

## 2024-10-05 RX ADMIN — NICOTINE POLACRILEX 2 MG: 2 GUM, CHEWING ORAL at 08:52

## 2024-10-05 RX ADMIN — METFORMIN HYDROCHLORIDE 500 MG: 500 TABLET, FILM COATED ORAL at 17:18

## 2024-10-05 RX ADMIN — NICOTINE POLACRILEX 2 MG: 2 GUM, CHEWING ORAL at 12:58

## 2024-10-05 RX ADMIN — PROPRANOLOL HYDROCHLORIDE 10 MG: 10 TABLET ORAL at 08:52

## 2024-10-05 RX ADMIN — ATROPINE SULFATE 1 DROP: 10 SOLUTION/ DROPS OPHTHALMIC at 21:25

## 2024-10-05 RX ADMIN — NICOTINE POLACRILEX 2 MG: 2 GUM, CHEWING ORAL at 17:18

## 2024-10-05 RX ADMIN — GLYCOPYRROLATE 1 MG: 1 TABLET ORAL at 13:05

## 2024-10-05 NOTE — NURSING NOTE
Alert, cooperative and visible intermittently pacing the unit with fellow female peer after lunch. Denies pain. Did not attend any groups. Consumed 100% of breakfast and refused lunch. Took all medication without prompting. Maintained on safe precautions without incident. Will continue to monitor progress and recovery program.

## 2024-10-05 NOTE — ASSESSMENT & PLAN NOTE
"Patient remains with ongoing depressive symptoms and ongoing auditory hallucinations of \"loud, angry, irritating voices.\" Remains with paranoia surrounding these voices and fears that someone is out to harm him.    At this time Clozapine has been discontinued in the setting of elevated CK. Will continue with CK monitoring and plan to draw CK level 10/3/24.    Continue Abilify 30 mg daily for psychosis  Continue escitalopram 20 mg tablet daily for depression and anxiety  Continue senna-docusate sodium two 50 mg tablets daily before bed for constipation    No associated orders from this encounter found during lookback period of 72 hours.    "

## 2024-10-05 NOTE — NURSING NOTE
Alert, cooperative and visible intermittently pacing the unit. Consumed 100% of dinner. Took all medication without prompting. Maintained on safe precautions without incident.

## 2024-10-05 NOTE — NURSING NOTE
Pt this am c/o of depression a 8/10 and anxiety was 2/4 and he had thoughts of passive SI although pt is chuckie for safety. Pt noted tearful and anxious.Daniels anxiety scale a #26. PRN atarax 100 mg administered PO @ 0953. Results pending.

## 2024-10-05 NOTE — PLAN OF CARE
Problem: Alteration in Thoughts and Perception  Goal: Refrain from acting on delusional thinking/internal stimuli  Description: Interventions:  - Monitor patient closely, per order   - Utilize least restrictive measures   - Set reasonable limits, give positive feedback for acceptable   - Administer medications as ordered and monitor of potential side effects  Outcome: Progressing  Goal: Agree to be compliant with medication regime, as prescribed and report medication side effects  Description: Interventions:  - Offer appropriate PRN medication and supervise ingestion; conduct AIMS, as needed   Outcome: Progressing  Goal: Complete daily ADLs, including personal hygiene independently, as able  Description: Interventions:  - Observe, teach, and assist patient with ADLS  - Monitor and promote a balance of rest/activity, with adequate nutrition and elimination   Outcome: Progressing     Problem: Ineffective Coping  Goal: Participates in unit activities  Description: Interventions:  - Provide therapeutic environment   - Provide required programming   - Redirect inappropriate behaviors   Outcome: Progressing     Problem: Depression  Goal: Verbalize thoughts and feelings  Description: Interventions:  - Assess and re-assess patient's level of risk   - Engage patient in 1:1 interactions, daily, for a minimum of 15 minutes   - Encourage patient to express feelings, fears, frustrations, hopes   Outcome: Progressing  Goal: Refrain from harming self  Description: Interventions:  - Monitor patient closely, per order   - Supervise medication ingestion, monitor effects and side effects   Outcome: Progressing  Goal: Refrain from self-neglect  Outcome: Progressing  Goal: Attend and participate in unit activities, including therapeutic, recreational, and educational groups  Description: Interventions:  - Provide therapeutic and educational activities daily, encourage attendance and participation, and document same in the medical  record   Outcome: Progressing     Problem: Anxiety  Goal: Anxiety is at manageable level  Description: Interventions:  - Assess and monitor patient's anxiety level.   - Monitor for signs and symptoms (heart palpitations, chest pain, shortness of breath, headaches, nausea, feeling jumpy, restlessness, irritable, apprehensive).   - Collaborate with interdisciplinary team and initiate plan and interventions as ordered.  - [place] patient to unit/surroundings  - Explain treatment plan  - Encourage participation in care  - Encourage verbalization of concerns/fears  - Identify coping mechanisms  - Assist in developing anxiety-reducing skills  - Administer/offer alternative therapies  - Limit or eliminate stimulants  Outcome: Progressing     Problem: Alteration in Orientation  Goal: Treatment Goal: Demonstrate a reduction of confusion and improved orientation to person, place, time and/or situation upon discharge, according to optimum baseline/ability  Outcome: Progressing  Goal: Interact with staff daily  Description: Interventions:  - Assess and re-assess patient's level of orientation  - Engage patient in 1 on 1 interactions, daily, for a minimum of 15 minutes   - Establish rapport/trust with patient   Outcome: Progressing  Goal: Express concerns related to confused thinking related to:  Description: Interventions:  - Encourage patient to express feelings, fears, frustrations, hopes  - Assign consistent caregivers   - [place]/re-orient patient as needed  - Allow comfort items, as appropriate  - Provide visual cues, signs, etc.   Outcome: Progressing

## 2024-10-05 NOTE — PROGRESS NOTES
"    Psychiatric Progress Note - Department of Behavioral Health   Alberto Berumen 28 y.o. male MRN: [id number]  Unit/Bed#: [facility] 108-02 Encounter: [id number]    ASSESSMENT & PLAN     Assessment & Plan  Schizoaffective disorder, bipolar type (HCC)  Patient remains with ongoing depressive symptoms and ongoing auditory hallucinations of \"loud, angry, irritating voices.\" Remains with paranoia surrounding these voices and fears that someone is out to harm him.    At this time Clozapine has been discontinued in the setting of elevated CK. Will continue with CK monitoring and plan to draw CK level 10/3/24.    Continue Abilify 30 mg daily for psychosis  Continue escitalopram 20 mg tablet daily for depression and anxiety  Continue senna-docusate sodium two 50 mg tablets daily before bed for constipation    No associated orders from this encounter found during lookback period of 72 hours.    Chronic idiopathic constipation  Continue senna-docusate sodium two tablets before bed per medical team    No associated orders from this encounter found during lookback period of 72 hours.    Treatment Recommendations/Precautions:  Continue to promote patient participation in therapeutic milieu.  Continue medical management per medicine.  Continue previously prescribed psychotropic medication regimen; see below.  Continue behavioral health checks q.7 minutes.   Continue vitals per behavioral health unit protocol.  Discharge date per primary team; 201 commitment status.    SUBJECTIVE     Patient evaluated this a.m. for continuity of care. Patient was discussed at length with charge RN. Per nursing, patient was recently treated for Clozaril toxicity. He has been attending groups and attended most yesterday. He has been visible in the milieu. No acute distress is noted throughout evaluation. Alberto Berumen denies suicidal/homicidal ideation in addition to thoughts of self-injury, receptive to crisis planning provided by this writer, " "contacting for safety in the inpatient setting, admitting to an ability to appropriately confide in staff. He is currently reporting auditory hallucinations that are derogatory in context. He shares hearing voices that he is \"worthless.\" He has been attempting to use coping skills. He has not required any behavioral PRN's and has not displayed any aggressive or self abusive behavior.     PSYCHIATRIC REVIEW OF SYSTEMS     Behavior over the last 24 hours:  unchanged  Sleep: normal  Appetite: normal  Medication side effects: No    REVIEW OF SYSTEMS   Review of systems: no complaints    OBJECTIVE     Vital Signs in Past 24 Hours:  Temp:  [97.3 °F (36.3 °C)-97.8 °F (36.6 °C)] 97.3 °F (36.3 °C)  HR:  [75-82] 82  BP: (122-127)/(68-76) 122/69  Resp:  [18] 18  SpO2:  [98 %-99 %] 98 %    Intake/Output in Past 24 hours:  No intake/output data recorded.  No intake/output data recorded.        Laboratory Results:  I have personally reviewed all pertinent laboratory/tests results.  Most Recent Labs:   Lab Results   Component Value Date    WBC 10.40 (H) 10/03/2024    RBC 5.31 10/03/2024    HGB 12.6 10/03/2024    HCT 41.9 10/03/2024     10/03/2024    RDW 14.3 10/03/2024    NEUTROABS 5.28 10/03/2024    SODIUM 139 10/01/2024    K 3.7 10/01/2024     10/01/2024    CO2 28 10/01/2024    BUN 10 10/01/2024    CREATININE 0.98 10/01/2024    GLUC 111 10/01/2024    GLUF 83 09/30/2024    CALCIUM 10.3 (H) 10/01/2024    AST 26 09/30/2024    ALT 42 09/30/2024    ALKPHOS 64 09/30/2024    TP 6.6 09/30/2024    ALB 3.8 09/30/2024    TBILI 0.47 09/30/2024    CHOLESTEROL 156 03/14/2024    HDL 44 03/14/2024    TRIG 133 03/14/2024    LDLCALC 85 03/14/2024    NONHDLC 112 03/14/2024    LITHIUM 0.61 01/09/2024    [id number] 1.062 11/15/2023    HGBA1C 5.0 04/01/2024    EAG 97 04/01/2024       Behavioral Health Medications: all current active meds have been reviewed, continue current psychiatric medications, and current meds:   Current " Facility-Administered Medications:     acetaminophen (TYLENOL) tablet 650 mg, Q4H PRN    acetaminophen (TYLENOL) tablet 650 mg, Q6H PRN    aluminum-magnesium hydroxide-simethicone (MAALOX) oral suspension 30 mL, Q4H PRN    amLODIPine (NORVASC) tablet 5 mg, Daily    ARIPiprazole (ABILIFY) tablet 30 mg, Daily    Artificial Tears ophthalmic solution 1 drop, Q3H PRN    atropine (ISOPTO ATROPINE) 1 % ophthalmic solution 1 drop, HS    haloperidol lactate (HALDOL) injection 2.5 mg, Q4H PRN Max 4/day **AND** LORazepam (ATIVAN) injection 1 mg, Q4H PRN Max 4/day **AND** benztropine (COGENTIN) injection 0.5 mg, Q4H PRN Max 4/day    benztropine (COGENTIN) injection 1 mg, Q4H PRN Max 6/day    haloperidol lactate (HALDOL) injection 5 mg, Q4H PRN Max 4/day **AND** LORazepam (ATIVAN) injection 2 mg, Q4H PRN Max 4/day **AND** benztropine (COGENTIN) injection 1 mg, Q4H PRN Max 4/day    benztropine (COGENTIN) tablet 1 mg, Q4H PRN Max 6/day    benztropine (COGENTIN) tablet 1 mg, Q4H PRN Max 6/day    bisacodyl (DULCOLAX) rectal suppository 10 mg, Daily PRN    calcium carbonate (TUMS) chewable tablet 500 mg, BID PRN    cyanocobalamin (VITAMIN B-12) tablet 1,000 mcg, Daily    hydrOXYzine HCL (ATARAX) tablet 50 mg, Q6H PRN Max 4/day **OR** diphenhydrAMINE (BENADRYL) injection 50 mg, Q6H PRN    hydrOXYzine HCL (ATARAX) tablet 50 mg, Q6H PRN Max 4/day **OR** diphenhydrAMINE (BENADRYL) injection 50 mg, Q6H PRN    diphenhydrAMINE-zinc acetate (BENADRYL) 2-0.1 % cream, BID PRN    escitalopram (LEXAPRO) tablet 20 mg, Daily    famotidine (PEPCID) tablet 20 mg, BID    fluticasone (FLONASE) 50 mcg/act nasal spray 1 spray, Daily    glycopyrrolate (ROBINUL) tablet 1 mg, BID (AM & Afternoon)    glycopyrrolate (ROBINUL) tablet 2 mg, HS    haloperidol (HALDOL) tablet 1 mg, Q6H PRN    haloperidol (HALDOL) tablet 2.5 mg, Q4H PRN Max 4/day    haloperidol (HALDOL) tablet 5 mg, Q4H PRN Max 4/day    hydroCHLOROthiazide tablet 12.5 mg, Daily    hydrocortisone  1 % ointment, 4x Daily PRN    hydrOXYzine HCL (ATARAX) tablet 100 mg, Q6H PRN Max 4/day **OR** LORazepam (ATIVAN) injection 2 mg, Q6H PRN    hydrOXYzine HCL (ATARAX) tablet 100 mg, Q6H PRN Max 4/day **OR** LORazepam (ATIVAN) injection 2 mg, Q6H PRN    hydrOXYzine HCL (ATARAX) tablet 25 mg, Q6H PRN Max 4/day    ibuprofen (MOTRIN) tablet 600 mg, Q8H PRN    influenza vaccine (PF) (Fluarix) IM injection 0.5 mL, Once    loratadine (CLARITIN) tablet 10 mg, Daily    melatonin tablet 3 mg, HS PRN    melatonin tablet 9 mg, HS    metFORMIN (GLUCOPHAGE) tablet 500 mg, BID With Meals    methocarbamol (ROBAXIN) tablet 500 mg, Q6H PRN    nicotine polacrilex (NICORETTE) gum 2 mg, Q4H PRN    OLANZapine (ZyPREXA) tablet 5 mg, Q4H PRN Max 3/day **OR** OLANZapine (ZyPREXA) IM injection 2.5 mg, Q4H PRN Max 3/day    OLANZapine (ZyPREXA) tablet 5 mg, Q3H PRN Max 3/day **OR** OLANZapine (ZyPREXA) IM injection 5 mg, Q3H PRN Max 3/day    OLANZapine (ZyPREXA) tablet 2.5 mg, Q4H PRN Max 6/day    ondansetron (ZOFRAN-ODT) dispersible tablet 4 mg, Q6H PRN    pantoprazole (PROTONIX) EC tablet 20 mg, QAM    polyethylene glycol (MIRALAX) packet 17 g, Daily PRN    propranolol (INDERAL) tablet 10 mg, Q12H KAYLIE    senna-docusate sodium (SENOKOT S) 8.6-50 mg per tablet 1 tablet, Daily PRN    senna-docusate sodium (SENOKOT S) 8.6-50 mg per tablet 2 tablet, HS    traZODone (DESYREL) tablet 50 mg, HS PRN    white petrolatum-mineral oil (EUCERIN,HYDROCERIN) cream, TID PRN.    Risks, benefits and possible side effects of Medications:   Risks, benefits, and possible side effects of medications explained to patient and patient verbalizes understanding.          Mental Status Evaluation:  Appearance:  age appropriate, casually dressed, and older than stated age   Behavior:  evasive   Speech:  normal pitch, normal volume, and normal rate   Mood:  anxious and depressed   Affect:  constricted   Language naming objects   Thought Process:  goal directed and logical    Thought Content:  Some paranoia    Perceptual Disturbances: Reports AH of voices telling him he is worthless.   Risk Potential: Suicidal Ideations none, Homicidal Ideations none, and Potential for Aggression No   Sensorium:  person, place, time/date, and situation   Cognition:  recent and remote memory grossly intact   Consciousness:  alert and awake    Attention: attention span and concentration were age appropriate   Insight:  intact   Judgment: intact   Intellect Not assessed   Gait/Station: normal gait/station   Motor Activity: no abnormal movements     Memory: Short and long term memory  intact     Progress Toward Goals: Unchanged, as evidenced by their participation (or lack thereof) in individual, social and therapeutic milieu in addition to adherence to their medication regimen.      Suicide/Homicide Risk Assessment:  Risk of Harm to Self:   Nursing Suicide Risk Assessment Last 24 hours: C-SSRS Risk (Since Last Contact)  Calculated C-SSRS Risk Score (Since Last Contact): No Risk Indicated    Risk of Harm to Others:  Nursing Homicide Risk Assessment: Violence Risk to Others: Denies within past 6 months      Recommended Treatment:   See above for assessment and plan.    Counseling/Coordination of Care    I have expended greater than 15 minutes in which more than 50% of this time was expended in counseling/coordination of patient care relating to diagnostic results, prognosis, potential risks and benefits of management options, instructions for appropriate management, patient and/or collateral education, importance of adherence to management and/or risk factor reductions. Patient's rights, confidentiality, exceptions to confidentiality, use of electronic medical record including appropriate staff access to medical record regarding behavioral health services and consent to treatment were reviewed.    Note Share:     This note was not shared with the patient due to reasonable likelihood of causing patient harm      This note has been constructed using a voice recognition system. There may be translation, syntax,  or grammatical errors. If you have any questions, please contact the dictating provider.    Tracy Hernandez PA-C 10/05/24

## 2024-10-05 NOTE — ASSESSMENT & PLAN NOTE
Continue senna-docusate sodium two tablets before bed per medical team    No associated orders from this encounter found during lookback period of 72 hours.

## 2024-10-06 VITALS
BODY MASS INDEX: 39.05 KG/M2 | DIASTOLIC BLOOD PRESSURE: 59 MMHG | HEIGHT: 66 IN | OXYGEN SATURATION: 96 % | SYSTOLIC BLOOD PRESSURE: 113 MMHG | RESPIRATION RATE: 18 BRPM | HEART RATE: 67 BPM | TEMPERATURE: 97.8 F | WEIGHT: 243 LBS

## 2024-10-06 PROCEDURE — 99232 SBSQ HOSP IP/OBS MODERATE 35: CPT | Performed by: PHYSICIAN ASSISTANT

## 2024-10-06 RX ADMIN — GLYCOPYRROLATE 2 MG: 1 TABLET ORAL at 21:21

## 2024-10-06 RX ADMIN — MELATONIN TAB 3 MG 9 MG: 3 TAB at 21:21

## 2024-10-06 RX ADMIN — PROPRANOLOL HYDROCHLORIDE 10 MG: 10 TABLET ORAL at 08:26

## 2024-10-06 RX ADMIN — FAMOTIDINE 20 MG: 20 TABLET ORAL at 17:13

## 2024-10-06 RX ADMIN — NICOTINE POLACRILEX 2 MG: 2 GUM, CHEWING ORAL at 13:24

## 2024-10-06 RX ADMIN — METFORMIN HYDROCHLORIDE 500 MG: 500 TABLET, FILM COATED ORAL at 08:27

## 2024-10-06 RX ADMIN — NICOTINE POLACRILEX 2 MG: 2 GUM, CHEWING ORAL at 17:13

## 2024-10-06 RX ADMIN — CYANOCOBALAMIN TAB 1000 MCG 1000 MCG: 1000 TAB at 08:27

## 2024-10-06 RX ADMIN — ARIPIPRAZOLE 30 MG: 15 TABLET ORAL at 08:27

## 2024-10-06 RX ADMIN — PROPRANOLOL HYDROCHLORIDE 10 MG: 10 TABLET ORAL at 21:21

## 2024-10-06 RX ADMIN — LORATADINE 10 MG: 10 TABLET ORAL at 08:27

## 2024-10-06 RX ADMIN — AMLODIPINE BESYLATE 5 MG: 5 TABLET ORAL at 08:26

## 2024-10-06 RX ADMIN — FAMOTIDINE 20 MG: 20 TABLET ORAL at 08:27

## 2024-10-06 RX ADMIN — FLUTICASONE PROPIONATE 1 SPRAY: 50 SPRAY, METERED NASAL at 08:29

## 2024-10-06 RX ADMIN — ESCITALOPRAM OXALATE 20 MG: 10 TABLET, FILM COATED ORAL at 08:27

## 2024-10-06 RX ADMIN — PANTOPRAZOLE SODIUM 20 MG: 20 TABLET, DELAYED RELEASE ORAL at 08:27

## 2024-10-06 RX ADMIN — NICOTINE POLACRILEX 2 MG: 2 GUM, CHEWING ORAL at 21:22

## 2024-10-06 RX ADMIN — ATROPINE SULFATE 1 DROP: 10 SOLUTION/ DROPS OPHTHALMIC at 21:21

## 2024-10-06 RX ADMIN — GLYCOPYRROLATE 1 MG: 1 TABLET ORAL at 08:27

## 2024-10-06 RX ADMIN — NICOTINE POLACRILEX 2 MG: 2 GUM, CHEWING ORAL at 08:27

## 2024-10-06 RX ADMIN — GLYCOPYRROLATE 1 MG: 1 TABLET ORAL at 13:23

## 2024-10-06 RX ADMIN — HYDROCHLOROTHIAZIDE 12.5 MG: 12.5 TABLET ORAL at 08:27

## 2024-10-06 RX ADMIN — METFORMIN HYDROCHLORIDE 500 MG: 500 TABLET, FILM COATED ORAL at 17:13

## 2024-10-06 RX ADMIN — SENNOSIDES AND DOCUSATE SODIUM 2 TABLET: 8.6; 5 TABLET ORAL at 21:21

## 2024-10-06 NOTE — PROGRESS NOTES
"    Psychiatric Progress Note - Department of Behavioral Health   Alberto Berumen 28 y.o. male MRN: [id number]  Unit/Bed#: [facility] 108-02 Encounter: [id number]    ASSESSMENT & PLAN     Assessment & Plan  Schizoaffective disorder, bipolar type (HCC)  Patient remains with ongoing depressive symptoms and ongoing auditory hallucinations of \"loud, angry, irritating voices.\" Remains with paranoia surrounding these voices and fears that someone is out to harm him.    At this time Clozapine has been discontinued in the setting of elevated CK. Will continue with CK monitoring and plan to draw CK level 10/3/24.    Continue Abilify 30 mg daily for psychosis  Continue escitalopram 20 mg tablet daily for depression and anxiety  Continue senna-docusate sodium two 50 mg tablets daily before bed for constipation    No associated orders from this encounter found during lookback period of 72 hours.    Chronic idiopathic constipation  Continue senna-docusate sodium two tablets before bed per medical team    No associated orders from this encounter found during lookback period of 72 hours.    Treatment Recommendations/Precautions:  Continue to promote patient participation in therapeutic milieu.  Continue medical management per medicine.  Continue previously prescribed psychotropic medication regimen; see below.  Continue behavioral health checks q.7 minutes.   Continue vitals per behavioral health unit protocol.  Discharge date per primary team; 201 commitment status.    SUBJECTIVE     Patient evaluated this a.m. for continuity of care. Patient was discussed at length with charge RN. Per nursing, he has been visible in the milieu. He has been cooperative and compliant. Yesterday he consumed 100% of breakfast, refused lunch, and ate 100% of dinner. Yesterday he was noted to be tearful and anxious, he was given PRN atarax (100 mg) at 0953 which was effective. Today he was found seated in the milieu. He describes his mood as \"depressed.\" " "No acute distress is noted throughout evaluation. Alberto Berumen notes some passive SI, but denies any current plan or intent of suicide, contacting for safety in the inpatient setting, admitting to an ability to appropriately confide in staff. He is currently reporting auditory hallucinations that are derogatory in context. He shares that he is still hearing voices intermittently. He is able to voice \"today is a little better.\" He has not displayed any aggressive of self injurious behavior.   PSYCHIATRIC REVIEW OF SYSTEMS     Behavior over the last 24 hours:  unchanged  Sleep: normal  Appetite: normal  Medication side effects: No    REVIEW OF SYSTEMS   Review of systems: no complaints    OBJECTIVE     Vital Signs in Past 24 Hours:  Temp:  [97.5 °F (36.4 °C)-97.7 °F (36.5 °C)] 97.7 °F (36.5 °C)  HR:  [78-95] 95  BP: (107-133)/(63-77) 121/77  Resp:  [18] 18  SpO2:  [97 %-98 %] 97 %    Intake/Output in Past 24 hours:  No intake/output data recorded.  No intake/output data recorded.        Laboratory Results:  I have personally reviewed all pertinent laboratory/tests results.  Most Recent Labs:   Lab Results   Component Value Date    WBC 10.40 (H) 10/03/2024    RBC 5.31 10/03/2024    HGB 12.6 10/03/2024    HCT 41.9 10/03/2024     10/03/2024    RDW 14.3 10/03/2024    NEUTROABS 5.28 10/03/2024    SODIUM 139 10/01/2024    K 3.7 10/01/2024     10/01/2024    CO2 28 10/01/2024    BUN 10 10/01/2024    CREATININE 0.98 10/01/2024    GLUC 111 10/01/2024    GLUF 83 09/30/2024    CALCIUM 10.3 (H) 10/01/2024    AST 26 09/30/2024    ALT 42 09/30/2024    ALKPHOS 64 09/30/2024    TP 6.6 09/30/2024    ALB 3.8 09/30/2024    TBILI 0.47 09/30/2024    CHOLESTEROL 156 03/14/2024    HDL 44 03/14/2024    TRIG 133 03/14/2024    LDLCALC 85 03/14/2024    NONHDLC 112 03/14/2024    LITHIUM 0.61 01/09/2024    [id number] 1.062 11/15/2023    HGBA1C 5.0 04/01/2024    EAG 97 04/01/2024       Behavioral Health Medications: all current " active meds have been reviewed, continue current psychiatric medications, and current meds:   Current Facility-Administered Medications:     acetaminophen (TYLENOL) tablet 650 mg, Q4H PRN    acetaminophen (TYLENOL) tablet 650 mg, Q6H PRN    aluminum-magnesium hydroxide-simethicone (MAALOX) oral suspension 30 mL, Q4H PRN    amLODIPine (NORVASC) tablet 5 mg, Daily    ARIPiprazole (ABILIFY) tablet 30 mg, Daily    Artificial Tears ophthalmic solution 1 drop, Q3H PRN    atropine (ISOPTO ATROPINE) 1 % ophthalmic solution 1 drop, HS    haloperidol lactate (HALDOL) injection 2.5 mg, Q4H PRN Max 4/day **AND** LORazepam (ATIVAN) injection 1 mg, Q4H PRN Max 4/day **AND** benztropine (COGENTIN) injection 0.5 mg, Q4H PRN Max 4/day    benztropine (COGENTIN) injection 1 mg, Q4H PRN Max 6/day    haloperidol lactate (HALDOL) injection 5 mg, Q4H PRN Max 4/day **AND** LORazepam (ATIVAN) injection 2 mg, Q4H PRN Max 4/day **AND** benztropine (COGENTIN) injection 1 mg, Q4H PRN Max 4/day    benztropine (COGENTIN) tablet 1 mg, Q4H PRN Max 6/day    benztropine (COGENTIN) tablet 1 mg, Q4H PRN Max 6/day    bisacodyl (DULCOLAX) rectal suppository 10 mg, Daily PRN    calcium carbonate (TUMS) chewable tablet 500 mg, BID PRN    cyanocobalamin (VITAMIN B-12) tablet 1,000 mcg, Daily    hydrOXYzine HCL (ATARAX) tablet 50 mg, Q6H PRN Max 4/day **OR** diphenhydrAMINE (BENADRYL) injection 50 mg, Q6H PRN    hydrOXYzine HCL (ATARAX) tablet 50 mg, Q6H PRN Max 4/day **OR** diphenhydrAMINE (BENADRYL) injection 50 mg, Q6H PRN    diphenhydrAMINE-zinc acetate (BENADRYL) 2-0.1 % cream, BID PRN    escitalopram (LEXAPRO) tablet 20 mg, Daily    famotidine (PEPCID) tablet 20 mg, BID    fluticasone (FLONASE) 50 mcg/act nasal spray 1 spray, Daily    glycopyrrolate (ROBINUL) tablet 1 mg, BID (AM & Afternoon)    glycopyrrolate (ROBINUL) tablet 2 mg, HS    haloperidol (HALDOL) tablet 1 mg, Q6H PRN    haloperidol (HALDOL) tablet 2.5 mg, Q4H PRN Max 4/day    haloperidol  (HALDOL) tablet 5 mg, Q4H PRN Max 4/day    hydroCHLOROthiazide tablet 12.5 mg, Daily    hydrocortisone 1 % ointment, 4x Daily PRN    hydrOXYzine HCL (ATARAX) tablet 100 mg, Q6H PRN Max 4/day **OR** LORazepam (ATIVAN) injection 2 mg, Q6H PRN    hydrOXYzine HCL (ATARAX) tablet 100 mg, Q6H PRN Max 4/day **OR** LORazepam (ATIVAN) injection 2 mg, Q6H PRN    hydrOXYzine HCL (ATARAX) tablet 25 mg, Q6H PRN Max 4/day    ibuprofen (MOTRIN) tablet 600 mg, Q8H PRN    influenza vaccine (PF) (Fluarix) IM injection 0.5 mL, Once    loratadine (CLARITIN) tablet 10 mg, Daily    melatonin tablet 3 mg, HS PRN    melatonin tablet 9 mg, HS    metFORMIN (GLUCOPHAGE) tablet 500 mg, BID With Meals    methocarbamol (ROBAXIN) tablet 500 mg, Q6H PRN    nicotine polacrilex (NICORETTE) gum 2 mg, Q4H PRN    OLANZapine (ZyPREXA) tablet 5 mg, Q4H PRN Max 3/day **OR** OLANZapine (ZyPREXA) IM injection 2.5 mg, Q4H PRN Max 3/day    OLANZapine (ZyPREXA) tablet 5 mg, Q3H PRN Max 3/day **OR** OLANZapine (ZyPREXA) IM injection 5 mg, Q3H PRN Max 3/day    OLANZapine (ZyPREXA) tablet 2.5 mg, Q4H PRN Max 6/day    ondansetron (ZOFRAN-ODT) dispersible tablet 4 mg, Q6H PRN    pantoprazole (PROTONIX) EC tablet 20 mg, QAM    polyethylene glycol (MIRALAX) packet 17 g, Daily PRN    propranolol (INDERAL) tablet 10 mg, Q12H KAYLIE    senna-docusate sodium (SENOKOT S) 8.6-50 mg per tablet 1 tablet, Daily PRN    senna-docusate sodium (SENOKOT S) 8.6-50 mg per tablet 2 tablet, HS    traZODone (DESYREL) tablet 50 mg, HS PRN    white petrolatum-mineral oil (EUCERIN,HYDROCERIN) cream, TID PRN.    Risks, benefits and possible side effects of Medications:   Risks, benefits, and possible side effects of medications explained to patient and patient verbalizes understanding.          Mental Status Evaluation:  Appearance:  age appropriate, casually dressed, and older than stated age wearing hooded sweatshirt    Behavior:  guarded, but cooperative, polite    Speech:  normal pitch,  normal volume, and normal rate   Mood:  depressed   Affect:  constricted   Language Not pressured    Thought Process:  goal directed and logical   Thought Content:  Some paranoia    Perceptual Disturbances: Reports AH of voices telling him he is worthless.   Risk Potential: Notes passive SI, denies any current plan or intent of suicide. Denies any homicidal ideation. No violence risk at this time.    Sensorium:  person, place, time/date, and situation   Cognition:  recent and remote memory grossly intact   Consciousness:  alert and awake    Attention: attention span and concentration were age appropriate   Insight:  intact   Judgment: intact   Intellect Not assessed   Gait/Station: normal gait/station   Motor Activity: no abnormal movements     Memory: Short and long term memory  intact     Progress Toward Goals: Unchanged, as evidenced by their participation (or lack thereof) in individual, social and therapeutic milieu in addition to adherence to their medication regimen.      Suicide/Homicide Risk Assessment:  Risk of Harm to Self:   Nursing Suicide Risk Assessment Last 24 hours: C-SSRS Risk (Since Last Contact)  Calculated C-SSRS Risk Score (Since Last Contact): No Risk Indicated    Risk of Harm to Others:  Nursing Homicide Risk Assessment: Violence Risk to Others: Denies within past 6 months      Recommended Treatment:   See above for assessment and plan.    Counseling/Coordination of Care    I have expended greater than 15 minutes in which more than 50% of this time was expended in counseling/coordination of patient care relating to diagnostic results, prognosis, potential risks and benefits of management options, instructions for appropriate management, patient and/or collateral education, importance of adherence to management and/or risk factor reductions. Patient's rights, confidentiality, exceptions to confidentiality, use of electronic medical record including appropriate staff access to medical record  regarding behavioral health services and consent to treatment were reviewed.    Note Share:     This note was not shared with the patient due to reasonable likelihood of causing patient harm     This note has been constructed using a voice recognition system. There may be translation, syntax,  or grammatical errors. If you have any questions, please contact the dictating provider.    Tracy Hernandez PA-C 10/06/24

## 2024-10-06 NOTE — NURSING NOTE
Regarding: [place] 75 F both feet and ankles are swollen with pain of level of 10. Mobile only assistance.  ----- Message from Rosie Quiñonez sent at 11/27/2023  4:10 PM CST -----  Patient Name: Willow Haque    Specialist or PCP Name: Yahaira Tate,     Symptoms: both feet and ankles are swollen with pain of level of 10. Mobile only assistance.     Pregnant (females aged 13-60. If Yes, how long?) :     Call Back # : [phone]    Which State are you currently located in?: [place]    Name of Clinic Site / Acct# : [facility]     Use following scripting for patients waiting for a callback:   \"Nurse callback times vary based on call volumes; please be aware the return phone call may come from an unidentified or out of state phone number. If your symptoms worsen or become life threatening while waiting, you should seek immediate assistance by calling 911 or going to the ER for evaluation.\"     Weekly wellness assessment completed. Pt lung sounds clear in all lung fields. Trace edema noted to b/l lower ext. Bowel sounds +x4. B/l pedal pulses papable. Skin intact and dryness noted to b/l feet. Skin warm and color within normal limits for patient.

## 2024-10-06 NOTE — PLAN OF CARE
Problem: Alteration in Thoughts and Perception  Goal: Agree to be compliant with medication regime, as prescribed and report medication side effects  Description: Interventions:  - Offer appropriate PRN medication and supervise ingestion; conduct AIMS, as needed   Outcome: Progressing     Problem: Depression  Goal: Refrain from harming self  Description: Interventions:  - Monitor patient closely, per order   - Supervise medication ingestion, monitor effects and side effects   Outcome: Progressing  Goal: Refrain from self-neglect  Outcome: Progressing  Goal: Attend and participate in unit activities, including therapeutic, recreational, and educational groups  Description: Interventions:  - Provide therapeutic and educational activities daily, encourage attendance and participation, and document same in the medical record   Outcome: Progressing     Problem: Anxiety  Goal: Anxiety is at manageable level  Description: Interventions:  - Assess and monitor patient's anxiety level.   - Monitor for signs and symptoms (heart palpitations, chest pain, shortness of breath, headaches, nausea, feeling jumpy, restlessness, irritable, apprehensive).   - Collaborate with interdisciplinary team and initiate plan and interventions as ordered.  - [place] patient to unit/surroundings  - Explain treatment plan  - Encourage participation in care  - Encourage verbalization of concerns/fears  - Identify coping mechanisms  - Assist in developing anxiety-reducing skills  - Administer/offer alternative therapies  - Limit or eliminate stimulants  Outcome: Progressing     Problem: Alteration in Orientation  Goal: Interact with staff daily  Description: Interventions:  - Assess and re-assess patient's level of orientation  - Engage patient in 1 on 1 interactions, daily, for a minimum of 15 minutes   - Establish rapport/trust with patient   Outcome: Progressing  Goal: Express concerns related to confused thinking related to:  Description:  Interventions:  - Encourage patient to express feelings, fears, frustrations, hopes  - Assign consistent caregivers   - [place]/re-orient patient as needed  - Allow comfort items, as appropriate  - Provide visual cues, signs, etc.   Outcome: Progressing  Goal: Cooperate with recommended testing/procedures  Description: Interventions:  - Determine need for ancillary testing  - Observe for mental status changes  - Implement falls/precaution protocol   Outcome: Progressing     Problem: Alteration in Thoughts and Perception  Goal: Verbalize thoughts and feelings  Description: Interventions:  - Promote a nonjudgmental and trusting relationship with the patient through active listening and therapeutic communication  - Assess patient's level of functioning, behavior and potential for risk  - Engage patient in 1 on 1 interactions  - Encourage patient to express fears, feelings, frustrations, and discuss symptoms    - [place] patient to reality, help patient recognize reality-based thinking   - Administer medications as ordered and assess for potential side effects  - Provide the patient education related to the signs and symptoms of the illness and desired effects of prescribed medications  Outcome: Not Progressing  Goal: Refrain from acting on delusional thinking/internal stimuli  Description: Interventions:  - Monitor patient closely, per order   - Utilize least restrictive measures   - Set reasonable limits, give positive feedback for acceptable   - Administer medications as ordered and monitor of potential side effects  Outcome: Not Progressing     Problem: Depression  Goal: Refrain from isolation  Description: Interventions:  - Develop a trusting relationship   - Encourage socialization   Outcome: Not Progressing     Problem: Alteration in Orientation  Goal: Attend and participate in unit activities, including therapeutic, recreational, and educational groups  Description: Interventions:  - Provide therapeutic and  educational activities daily, encourage attendance and participation, and document same in the medical record   - Provide appropriate opportunities for reminiscence   - Provide a consistent daily routine   - Encourage family contact/visitation   Outcome: Not Progressing

## 2024-10-06 NOTE — NURSING NOTE
Alert, cooperative and visible intermittently. No SI or HI noted. Pt c/o of 8/10 depression, and 2/4 anxiety. Routine dose of lexapro administered as ordered. Pt states he has passive SI and is chuckie for safety. Denies pain. Attended exercise and self wellness. Consumed 100% of breakfast and refused lunch. Took all medication without prompting. Nicotine gum administered as ordered. Maintained on safe precautions without incident. Will continue to monitor progress and recovery program.

## 2024-10-06 NOTE — NURSING NOTE
Alberto maintained on ongoing SAFE precaution without incident on this shift. Receive him in bed with eyes closed, respiration even and unlabored.  Stated he did not have a good night rest last night. (Possibly due to roommate) Attended and participated in 1 out of 8 groups today. Continues to be compliant with medication regimen . Denies any pain or discomfort.   Denies all psych symptoms

## 2024-10-07 PROCEDURE — 99232 SBSQ HOSP IP/OBS MODERATE 35: CPT | Performed by: PSYCHIATRY & NEUROLOGY

## 2024-10-07 RX ORDER — CLOZAPINE 25 MG/1
25 TABLET ORAL
Status: DISCONTINUED | OUTPATIENT
Start: 2024-10-07 | End: 2024-10-08

## 2024-10-07 RX ADMIN — GLYCOPYRROLATE 1 MG: 1 TABLET ORAL at 14:22

## 2024-10-07 RX ADMIN — CYANOCOBALAMIN TAB 1000 MCG 1000 MCG: 1000 TAB at 08:35

## 2024-10-07 RX ADMIN — METFORMIN HYDROCHLORIDE 500 MG: 500 TABLET, FILM COATED ORAL at 08:35

## 2024-10-07 RX ADMIN — PANTOPRAZOLE SODIUM 20 MG: 20 TABLET, DELAYED RELEASE ORAL at 08:36

## 2024-10-07 RX ADMIN — PROPRANOLOL HYDROCHLORIDE 10 MG: 10 TABLET ORAL at 08:37

## 2024-10-07 RX ADMIN — GLYCOPYRROLATE 1 MG: 1 TABLET ORAL at 08:36

## 2024-10-07 RX ADMIN — NICOTINE POLACRILEX 2 MG: 2 GUM, CHEWING ORAL at 08:38

## 2024-10-07 RX ADMIN — ESCITALOPRAM OXALATE 20 MG: 10 TABLET, FILM COATED ORAL at 08:37

## 2024-10-07 RX ADMIN — PROPRANOLOL HYDROCHLORIDE 10 MG: 10 TABLET ORAL at 21:21

## 2024-10-07 RX ADMIN — FAMOTIDINE 20 MG: 20 TABLET ORAL at 08:35

## 2024-10-07 RX ADMIN — ARIPIPRAZOLE 30 MG: 15 TABLET ORAL at 08:38

## 2024-10-07 RX ADMIN — METFORMIN HYDROCHLORIDE 500 MG: 500 TABLET, FILM COATED ORAL at 16:56

## 2024-10-07 RX ADMIN — FAMOTIDINE 20 MG: 20 TABLET ORAL at 17:00

## 2024-10-07 RX ADMIN — GLYCOPYRROLATE 2 MG: 1 TABLET ORAL at 21:20

## 2024-10-07 RX ADMIN — NICOTINE POLACRILEX 2 MG: 2 GUM, CHEWING ORAL at 12:40

## 2024-10-07 RX ADMIN — NICOTINE POLACRILEX 2 MG: 2 GUM, CHEWING ORAL at 16:56

## 2024-10-07 RX ADMIN — ALUMINUM HYDROXIDE, MAGNESIUM HYDROXIDE, AND DIMETHICONE 30 ML: 200; 20; 200 SUSPENSION ORAL at 17:07

## 2024-10-07 RX ADMIN — CLOZAPINE 25 MG: 25 TABLET ORAL at 21:21

## 2024-10-07 RX ADMIN — MELATONIN TAB 3 MG 9 MG: 3 TAB at 21:20

## 2024-10-07 RX ADMIN — FLUTICASONE PROPIONATE 1 SPRAY: 50 SPRAY, METERED NASAL at 08:38

## 2024-10-07 RX ADMIN — HYDROCHLOROTHIAZIDE 12.5 MG: 12.5 TABLET ORAL at 08:35

## 2024-10-07 RX ADMIN — LORATADINE 10 MG: 10 TABLET ORAL at 08:37

## 2024-10-07 NOTE — NURSING NOTE
Pt visible on unit. Pt reports being depressed because of the voices. Appetite is poor. Pt refused breakfast and consumed 100% of lunch. Compliant with scheduled medications. Social with staff and peers. Q 7 min checks maintained.

## 2024-10-07 NOTE — PROGRESS NOTES
"Progress Note - Behavioral Health   Name: Alberto Berumen 28 y.o. male I MRN: [id number]  Unit/Bed#: [facility] 108-02 I Date of Admission: 3/29/2024   Date of Service: 10/7/2024 I Hospital Day: 192     Assessment & Plan  Schizoaffective disorder, bipolar type (HCC)  Patient remains with ongoing depressive symptoms and ongoing auditory hallucinations of \"loud, angry, irritating voices.\" Remains with paranoia surrounding these voices and fears that someone is out to harm him.    Restarted Clozapine 25 mg at bedtime for psychosis  Plan to obtain CBC (weekly), BNP (every 2 weeks), CK (weekly) for ongoing clozapine monitoring    Continue Abilify 30 mg daily for psychosis  Continue escitalopram 20 mg tablet daily for depression and anxiety  Continue senna-docusate sodium two 50 mg tablets daily before bed for constipation    No associated orders from this encounter found during lookback period of 72 hours.    Chronic idiopathic constipation  Continue senna-docusate sodium two tablets before bed per medical team    No associated orders from this encounter found during lookback period of 72 hours.    Plan:  Continue previously prescribed psychotropic medication regimen:  Currently on 201 commitment status  Continue to promote patient participation in therapeutic milieu.  Continue medical management per medicine.  Continue behavioral health checks q.7 minutes.   Continue vitals per behavioral health unit protocol.  Safety: Safety and communication plan established to target dynamic risk factors discussed above.   Discharge and disposition: At this time plans are being made for patient to discharge to his aunt's house following clinical stabilization    SUBJECTIVE     Patient evaluated this a.m. for continuity of care. Patient was discussed at length with nursing and treatment team. Per nursing, Alberto continues to make slow progress on the inpatient unit. He continues to remain with symptoms of psychosis and continues to " "express auditory hallucinations that are derogatory in terms of content. Yesterday he described his mood as \"depressed.\" He was noted to be visible in the milieu and he attended 4 out of 8 groups yesterday. On evening nursing assessment he was noted to be socializing with his peers. On the inpatient unit he has been medication and meal compliant.    During evaluation, no acute distress is noted. Alberto Berumen reports feeling \"stressed.\"    At this time, on the inpatient psychiatric unit, Alberto reports that he continues to struggle with ongoing symptoms of depression and anxiety in the setting of ongoing psychosis.  He rates his depression today as a 9 out of 10 and his anxiety as a 7 out of 10 (with 10 being the most severe symptomatology).  He reports that he has been doing his best to continue to stay active and attend groups.    On the inpatient unit, Alberto reports that this time he continues to experience derogatory auditory hallucinations \"all the time.\"  He reports that these voices have not been command in nature over the past 24 hours.  He reports that they continue to say mean and negative comments towards him.    At this time, Alberto reports on the inpatient unit he continues to struggle with going to sleep.  He reports a decreased appetite as well.  In discussion with nursing, the patient has lost approximately 6 pounds in the past week.    Alberto reports he has been adherent to his psychiatric medications.  He denies medication side effects.    Patient reports ongoing passive suicidal ideation with no active plan or intent to harm himself or others.  He remains with ongoing derogatory auditory hallucinations.  He denies visual hallucinations.  He denies homicidal ideation.      PSYCHIATRIC REVIEW OF SYSTEMS     Behavior over the last 24 hours:  unchanged  Sleep: decreased, difficulty falling asleep  Appetite: decreased  Medication side effects: No    Progress Toward Goals: Slow progress, as " evidenced by their participation in individual, social and therapeutic milieu in addition to adherence to their medication regimen.  Patient Acceptance: Fair  Patient Understanding: Limited  Patient Involvement in Reintegration Process: Patient remains in contact with his aunt  Patient's Therapeutic Relationship with Psychiatrist: Trusting  Patient's Optimism Regarding Recovery: Hopeful to discharge to his aunt's house following the coordination of ACT services  Group Attendance: 4 of 8    REVIEW OF SYSTEMS   Review of systems: no complaints    OBJECTIVE     Vital Signs in Past 24 Hours:  Temp:  [97.8 °F (36.6 °C)-98 °F (36.7 °C)] 98 °F (36.7 °C)  HR:  [67-84] 84  BP: (113-120)/(59-81) 117/81  Resp:  [17-18] 17  SpO2:  [96 %-100 %] 100 %  O2 Device: None (Room air)    Intake/Output in Past 24 hours:  No intake/output data recorded.  No intake/output data recorded.        Laboratory Results:  I have personally reviewed all pertinent laboratory/tests results.  Most Recent Labs:   Lab Results   Component Value Date    WBC 10.40 (H) 10/03/2024    RBC 5.31 10/03/2024    HGB 12.6 10/03/2024    HCT 41.9 10/03/2024     10/03/2024    RDW 14.3 10/03/2024    NEUTROABS 5.28 10/03/2024    SODIUM 139 10/01/2024    K 3.7 10/01/2024     10/01/2024    CO2 28 10/01/2024    BUN 10 10/01/2024    CREATININE 0.98 10/01/2024    GLUC 111 10/01/2024    GLUF 83 09/30/2024    CALCIUM 10.3 (H) 10/01/2024    AST 26 09/30/2024    ALT 42 09/30/2024    ALKPHOS 64 09/30/2024    TP 6.6 09/30/2024    ALB 3.8 09/30/2024    TBILI 0.47 09/30/2024    CHOLESTEROL 156 03/14/2024    HDL 44 03/14/2024    TRIG 133 03/14/2024    LDLCALC 85 03/14/2024    NONHDLC 112 03/14/2024    LITHIUM 0.61 01/09/2024    [id number] 1.062 11/15/2023    HGBA1C 5.0 04/01/2024    EAG 97 04/01/2024       Behavioral Health Medications: all current active meds have been reviewed and continue current psychiatric medications.    Current Facility-Administered Medications    Medication Dose Route Frequency Provider Last Rate    acetaminophen  650 mg Oral Q4H PRN Jordan C Holter, DO      acetaminophen  650 mg Oral Q6H PRN HOLLI Lion      aluminum-magnesium hydroxide-simethicone  30 mL Oral Q4H PRN Jordan C Holter, DO      amLODIPine  5 mg Oral Daily HOLLI Lion      ARIPiprazole  30 mg Oral Daily Bora Rosario MD      Artificial Tears  1 drop Both Eyes Q3H PRN Jordan C Holter, DO      atropine  1 drop Sublingual HS Harjeet TorresDO      haloperidol lactate  2.5 mg Intramuscular Q4H PRN Max 4/day HOLLI Lion      And    LORazepam  1 mg Intramuscular Q4H PRN Max 4/day HOLLI Lion      And    benztropine  0.5 mg Intramuscular Q4H PRN Max 4/day HOLLI Lion      benztropine  1 mg Intramuscular Q4H PRN Max 6/day Jordan C Holter, DO      haloperidol lactate  5 mg Intramuscular Q4H PRN Max 4/day HOLLI Lion      And    LORazepam  2 mg Intramuscular Q4H PRN Max 4/day HOLLI Lion      And    benztropine  1 mg Intramuscular Q4H PRN Max 4/day HOLLI Lion      benztropine  1 mg Oral Q4H PRN Max 6/day HOLLI Lion      benztropine  1 mg Oral Q4H PRN Max 6/day Jordan C Holter, DO      bisacodyl  10 mg Rectal Daily PRN HOLLI Lion      calcium carbonate  500 mg Oral BID PRN HOLLI Monge      cyanocobalamin  1,000 mcg Oral Daily HOLLI Galvan      hydrOXYzine HCL  50 mg Oral Q6H PRN Max 4/day Jordan C Holter, DO      Or    diphenhydrAMINE  50 mg Intramuscular Q6H PRN Jordan C Holter, DO      hydrOXYzine HCL  50 mg Oral Q6H PRN Max 4/day HOLLI Lion      Or    diphenhydrAMINE  50 mg Intramuscular Q6H PRN HOLLI Lion      diphenhydrAMINE-zinc acetate   Topical BID PRN HOLLI Lion      escitalopram  20 mg Oral Daily HOLLI Lion      famotidine  20 mg Oral BID HOLLI Monge      fluticasone  1 spray Each Nare Daily Brina Guillen MD      glycopyrrolate  1 mg Oral BID  (AM & Afternoon) Bora Rosario MD      glycopyrrolate  2 mg Oral HS Bora Rosario MD      haloperidol  1 mg Oral Q6H PRN HOLLI Lion      haloperidol  2.5 mg Oral Q4H PRN Max 4/day HOLLI Lion      haloperidol  5 mg Oral Q4H PRN Max 4/day HOLLI Lion      hydroCHLOROthiazide  12.5 mg Oral Daily HOLLI Galvan      hydrocortisone   Topical 4x Daily PRN HOLLI Lion      hydrOXYzine HCL  100 mg Oral Q6H PRN Max 4/day [facility] Holter, DO      Or    LORazepam  2 mg Intramuscular Q6H PRN [facility] Holter, DO      hydrOXYzine HCL  100 mg Oral Q6H PRN Max 4/day HOLLI Lion      Or    LORazepam  2 mg Intramuscular Q6H PRN HOLLI Lion      hydrOXYzine HCL  25 mg Oral Q6H PRN Max 4/day [facility] Holter, DO      ibuprofen  600 mg Oral Q8H PRN HOLLI Lion      influenza vaccine  0.5 mL Intramuscular Once Bora Rosario MD      loratadine  10 mg Oral Daily Brina Guillen MD      melatonin  3 mg Oral HS PRN Bora Rosario MD      melatonin  9 mg Oral HS Bora Rosario MD      metFORMIN  500 mg Oral BID With Meals HOLLI Galvan      methocarbamol  500 mg Oral Q6H PRN HOLLI Lion      nicotine polacrilex  2 mg Oral Q4H PRN Bora Rosario MD      OLANZapine  5 mg Oral Q4H PRN Max 3/day [facility] Holter, DO      Or    OLANZapine  2.5 mg Intramuscular Q4H PRN Max 3/day [facility] Holter, DO      OLANZapine  5 mg Oral Q3H PRN Max 3/day [facility] Holter, DO      Or    OLANZapine  5 mg Intramuscular Q3H PRN Max 3/day [facility] Holter, DO      OLANZapine  2.5 mg Oral Q4H PRN Max 6/day [facility] Holter, DO      ondansetron  4 mg Oral Q6H PRN HOLLI Lion      [facility]      polyethylene glycol  17 g Oral Daily PRN HOLLI Ghotra      propranolol  10 mg Oral Q12H KAYLIE HOLLI Lion      senna-docusate sodium  1 tablet Oral Daily PRN Jordan C Holter, DO senna-docusate sodium  2 tablet Oral HS Melvina De Jesus DO       "traZODone  50 mg Oral HS PRN HOLLI Lion      white petrolatum-mineral oil   Topical TID PRN HOLLI Lion         Risks, benefits and possible side effects of Medications:   Risks, benefits, and possible side effects of medications explained to patient and patient verbalizes understanding.      Dual Antipsychotic Rationale: Not applicable     Mental Status Evaluation:  Appearance:  Age-appropriate, casually dressed, overweight, fair grooming and hygiene, limited eye contact, no acute distress   Behavior:  Polite, cooperative   Speech:  Normal rate, soft, fluent, clear, coherent   Mood:  \"Depressed\"   Affect:  blunted, depressed   Thought Process:  [place], linear, goal directed   Associations: concrete associations   Thought Content:  Remains with some paranoia in the context of ongoing struggles with auditory hallucinations   Perceptual Disturbances: Reports auditory hallucinations that make derogatory comments and states that these occur \"all the time.\"  He denies visual hallucinations.  He appears internally preoccupied.   Risk Potential: Suicidal ideation - None  Homicidal ideation - None  Potential for aggression - No   Sensorium:  oriented to person, place, and time/date   Memory:  recent and remote memory grossly intact   Consciousness:  alert and awake   Attention/Concentration: attention span and concentration are age appropriate   Insight:  limited   Judgment: limited   Gait/Station: normal gait/station, normal balance   Motor Activity: no abnormal movements     Recommended Treatment:   See above for assessment and plan.    Inpatient Psychiatric Certification:  Patient is not at goal. They are not yet ready for discharge. The patient's condition currently requires active psychopharmacological medication management, interdisciplinary coordination with case management, and the utilization of adjunctive milieu and group therapy to augment psychopharmacological efficacy. The patient's risk of " morbidity, and progression or decompensation of psychiatric disease, is higher without this current treatment. Based upon physical, mental and social evaluations, I certify that inpatient psychiatric services are medically necessary for this patient for a duration of >2 midnights for the treatment of Schizoaffective disorder, bipolar type (HCC)  Available alternative community resources do not meet the patient's mental health care needs.  I further attest that an established written individualized plan of care has been implemented and is outlined in the patient's medical records.    Counseling/Coordination of Care    Total unit time spent today was greater than 15 minutes. Greater than 50% of total time was spent with the patient and/or patient's relatives and/or coordination of patient's care.     Patient's rights, confidentiality, exceptions to confidentiality, use of electronic medical record including appropriate staff access to medical record regarding behavioral health services and consent to treatment were reviewed.    Note Share:     This note was not shared with the patient due to reasonable likelihood of causing patient harm     Hardik So MD   Psychiatry, PGY-4

## 2024-10-07 NOTE — NURSING NOTE
Alberto maintained on ongoing SAFE precaution without incident on this shift. Receive him in bed with eyes closed, respiration even and unlabored.  Mostly in bed for this shift.  Denies pain or discomfort.  c/o feeling tired.   Attended and participated in 4 out of 7 groups today. Continues to be compliant with medication regimen.  Denies all psych symptoms

## 2024-10-07 NOTE — PROGRESS NOTES
10/07/24 0809   Team Meeting   Meeting Type Daily Rounds   Team Members Present   Team Members Present Physician;Nurse;;Other (Discipline and Name)   Physician Team Member Holter, Thomas   Nursing Team Member Claudia   Social Work Team Member Jose J OTREGA   Patient/Family Present   Patient Present No   Patient's Family Present No     Wang PCM   Groups Participation  4/8.   Patient's compliant with medications. He's engaged in his treatment. Awaiting funding for ACT with plan to d/c to home with family. Patient reports increased depression, tearful Atarax 100mg. Reports passive SI denies intent.

## 2024-10-07 NOTE — ASSESSMENT & PLAN NOTE
"Patient remains with ongoing depressive symptoms and ongoing auditory hallucinations of \"loud, angry, irritating voices.\" Remains with paranoia surrounding these voices and fears that someone is out to harm him.    Restarted Clozapine 25 mg at bedtime for psychosis  Plan to obtain CBC (weekly), BNP (every 2 weeks), CK (weekly) for ongoing clozapine monitoring    Continue Abilify 30 mg daily for psychosis  Continue escitalopram 20 mg tablet daily for depression and anxiety  Continue senna-docusate sodium two 50 mg tablets daily before bed for constipation    No associated orders from this encounter found during lookback period of 72 hours.    "

## 2024-10-07 NOTE — PLAN OF CARE
Problem: Alteration in Thoughts and Perception  Goal: Agree to be compliant with medication regime, as prescribed and report medication side effects  Description: Interventions:  - Offer appropriate PRN medication and supervise ingestion; conduct AIMS, as needed   Outcome: Progressing     Problem: Ineffective Coping  Goal: Participates in unit activities  Description: Interventions:  - Provide therapeutic environment   - Provide required programming   - Redirect inappropriate behaviors   Outcome: Progressing     Problem: Depression  Goal: Refrain from harming self  Description: Interventions:  - Monitor patient closely, per order   - Supervise medication ingestion, monitor effects and side effects   Outcome: Progressing  Goal: Refrain from isolation  Description: Interventions:  - Develop a trusting relationship   - Encourage socialization   Outcome: Progressing  Goal: Refrain from self-neglect  Outcome: Progressing  Goal: Attend and participate in unit activities, including therapeutic, recreational, and educational groups  Description: Interventions:  - Provide therapeutic and educational activities daily, encourage attendance and participation, and document same in the medical record   Outcome: Progressing     Problem: Anxiety  Goal: Anxiety is at manageable level  Description: Interventions:  - Assess and monitor patient's anxiety level.   - Monitor for signs and symptoms (heart palpitations, chest pain, shortness of breath, headaches, nausea, feeling jumpy, restlessness, irritable, apprehensive).   - Collaborate with interdisciplinary team and initiate plan and interventions as ordered.  - [place] patient to unit/surroundings  - Explain treatment plan  - Encourage participation in care  - Encourage verbalization of concerns/fears  - Identify coping mechanisms  - Assist in developing anxiety-reducing skills  - Administer/offer alternative therapies  - Limit or eliminate stimulants  Outcome: Progressing      Problem: Alteration in Orientation  Goal: Interact with staff daily  Description: Interventions:  - Assess and re-assess patient's level of orientation  - Engage patient in 1 on 1 interactions, daily, for a minimum of 15 minutes   - Establish rapport/trust with patient   Outcome: Progressing  Goal: Allow medical examinations, as recommended  Description: Interventions:  - Provide physical/neurological exams and/or referrals, per provider   Outcome: Progressing  Goal: Cooperate with recommended testing/procedures  Description: Interventions:  - Determine need for ancillary testing  - Observe for mental status changes  - Implement falls/precaution protocol   Outcome: Progressing  Goal: Attend and participate in unit activities, including therapeutic, recreational, and educational groups  Description: Interventions:  - Provide therapeutic and educational activities daily, encourage attendance and participation, and document same in the medical record   - Provide appropriate opportunities for reminiscence   - Provide a consistent daily routine   - Encourage family contact/visitation   Outcome: Progressing  Goal: Complete daily ADLs, including personal hygiene independently, as able  Description: Interventions:  - Observe, teach, and assist patient with ADLS  Outcome: Progressing     Problem: Ineffective Coping  Goal: Identifies healthy coping skills  Outcome: Not Progressing  Goal: Demonstrates healthy coping skills  Outcome: Not Progressing

## 2024-10-07 NOTE — NURSING NOTE
Alberto is visible on the unit> He is quiet and states he is depressed. He states he is a bit anxious as well. His affect  is somewhat flat as well. He talks softly and has minimal interaction with peers.   He was c/o slight nausea after dinner. He received Maalox at 1707  He went to bed early and states he is tired and derpressed

## 2024-10-08 LAB
BASOPHILS # BLD AUTO: 0.07 THOUSANDS/ÂΜL (ref 0–0.1)
BASOPHILS NFR BLD AUTO: 1 % (ref 0–1)
CRP SERPL QL: 10.1 MG/L
EOSINOPHIL # BLD AUTO: 0.35 THOUSAND/ÂΜL (ref 0–0.61)
EOSINOPHIL NFR BLD AUTO: 3 % (ref 0–6)
ERYTHROCYTE [DISTWIDTH] IN BLOOD BY AUTOMATED COUNT: 14.3 % (ref 11.6–15.1)
HCT VFR BLD AUTO: 42.1 % (ref 36.5–49.3)
HGB BLD-MCNC: 13.6 G/DL (ref 12–17)
IMM GRANULOCYTES # BLD AUTO: 0.02 THOUSAND/UL (ref 0–0.2)
IMM GRANULOCYTES NFR BLD AUTO: 0 % (ref 0–2)
LYMPHOCYTES # BLD AUTO: 3.45 THOUSANDS/ÂΜL (ref 0.6–4.47)
LYMPHOCYTES NFR BLD AUTO: 34 % (ref 14–44)
MCH RBC QN AUTO: 24.1 PG (ref 26.8–34.3)
MCHC RBC AUTO-ENTMCNC: 32.3 G/DL (ref 31.4–37.4)
MCV RBC AUTO: 75 FL (ref 82–98)
MONOCYTES # BLD AUTO: 1.01 THOUSAND/ÂΜL (ref 0.17–1.22)
MONOCYTES NFR BLD AUTO: 10 % (ref 4–12)
NEUTROPHILS # BLD AUTO: 5.38 THOUSANDS/ÂΜL (ref 1.85–7.62)
NEUTS SEG NFR BLD AUTO: 52 % (ref 43–75)
NRBC BLD AUTO-RTO: 0 /100 WBCS
PLATELET # BLD AUTO: 271 THOUSANDS/UL (ref 149–390)
PMV BLD AUTO: 10.4 FL (ref 8.9–12.7)
RBC # BLD AUTO: 5.65 MILLION/UL (ref 3.88–5.62)
WBC # BLD AUTO: 10.28 THOUSAND/UL (ref 4.31–10.16)

## 2024-10-08 PROCEDURE — 82550 ASSAY OF CK (CPK): CPT

## 2024-10-08 PROCEDURE — 82553 CREATINE MB FRACTION: CPT

## 2024-10-08 PROCEDURE — 99232 SBSQ HOSP IP/OBS MODERATE 35: CPT | Performed by: PSYCHIATRY & NEUROLOGY

## 2024-10-08 PROCEDURE — 86140 C-REACTIVE PROTEIN: CPT

## 2024-10-08 PROCEDURE — 85025 COMPLETE CBC W/AUTO DIFF WBC: CPT

## 2024-10-08 RX ORDER — CLOZAPINE 25 MG/1
50 TABLET ORAL
Status: DISCONTINUED | OUTPATIENT
Start: 2024-10-08 | End: 2024-10-09

## 2024-10-08 RX ADMIN — CLOZAPINE 50 MG: 25 TABLET ORAL at 21:32

## 2024-10-08 RX ADMIN — METFORMIN HYDROCHLORIDE 500 MG: 500 TABLET, FILM COATED ORAL at 17:24

## 2024-10-08 RX ADMIN — CYANOCOBALAMIN TAB 1000 MCG 1000 MCG: 1000 TAB at 08:32

## 2024-10-08 RX ADMIN — ATROPINE SULFATE 1 DROP: 10 SOLUTION/ DROPS OPHTHALMIC at 21:33

## 2024-10-08 RX ADMIN — GLYCOPYRROLATE 2 MG: 1 TABLET ORAL at 21:33

## 2024-10-08 RX ADMIN — GLYCOPYRROLATE 1 MG: 1 TABLET ORAL at 08:31

## 2024-10-08 RX ADMIN — HYDROCHLOROTHIAZIDE 12.5 MG: 12.5 TABLET ORAL at 08:31

## 2024-10-08 RX ADMIN — PROPRANOLOL HYDROCHLORIDE 10 MG: 10 TABLET ORAL at 08:31

## 2024-10-08 RX ADMIN — PANTOPRAZOLE SODIUM 20 MG: 20 TABLET, DELAYED RELEASE ORAL at 08:31

## 2024-10-08 RX ADMIN — PROPRANOLOL HYDROCHLORIDE 10 MG: 10 TABLET ORAL at 21:33

## 2024-10-08 RX ADMIN — NICOTINE POLACRILEX 2 MG: 2 GUM, CHEWING ORAL at 17:24

## 2024-10-08 RX ADMIN — ESCITALOPRAM OXALATE 20 MG: 10 TABLET, FILM COATED ORAL at 08:31

## 2024-10-08 RX ADMIN — GLYCOPYRROLATE 1 MG: 1 TABLET ORAL at 14:45

## 2024-10-08 RX ADMIN — FAMOTIDINE 20 MG: 20 TABLET ORAL at 08:32

## 2024-10-08 RX ADMIN — NICOTINE POLACRILEX 2 MG: 2 GUM, CHEWING ORAL at 13:02

## 2024-10-08 RX ADMIN — ARIPIPRAZOLE 30 MG: 15 TABLET ORAL at 08:31

## 2024-10-08 RX ADMIN — NICOTINE POLACRILEX 2 MG: 2 GUM, CHEWING ORAL at 08:32

## 2024-10-08 RX ADMIN — MELATONIN TAB 3 MG 9 MG: 3 TAB at 21:33

## 2024-10-08 RX ADMIN — METFORMIN HYDROCHLORIDE 500 MG: 500 TABLET, FILM COATED ORAL at 08:32

## 2024-10-08 RX ADMIN — AMLODIPINE BESYLATE 5 MG: 5 TABLET ORAL at 08:32

## 2024-10-08 RX ADMIN — FAMOTIDINE 20 MG: 20 TABLET ORAL at 17:25

## 2024-10-08 RX ADMIN — LORATADINE 10 MG: 10 TABLET ORAL at 08:32

## 2024-10-08 NOTE — PROGRESS NOTES
Progress Note - Behavioral Health   Name: Alberto Berumen 28 y.o. male I MRN: [id number]  Unit/Bed#: [facility] 108-02 I Date of Admission: 3/29/2024   Date of Service: 10/8/2024 I Hospital Day: 193     Assessment & Plan  Schizoaffective disorder, bipolar type (HCC)  Continues to complain of threatening voices that has gotten louder and irritating since clozapine was discontinued and was apprehensive about going back on it but did anyway with 25 mg at bedtime which he has tolerated which will now be increased to 50 mg as of today and titrated upwards      Plan to obtain CBC (weekly), BNP (every 2 weeks), CK (weekly) for ongoing clozapine monitoring    Continue Abilify 30 mg daily for psychosis  Continue escitalopram 20 mg tablet daily for depression and anxiety  Continue senna-docusate sodium two 50 mg tablets daily before bed for constipation    No associated orders from this encounter found during lookback period of 72 hours.    Chronic idiopathic constipation  Continue senna-docusate sodium two tablets before bed per medical team being monitored for by nursing    No associated orders from this encounter found during lookback period of 72 hours.  GERD (gastroesophageal reflux disease)  Continue famotidine 20 mg tablet BID per medical team  Continue glycopyrrolate 1 mg tablet BID AM and afternoon per medical team  Continue glycopyrrolate 2 mg tablet before bed per medical team  Continue pantoprazole EC 20 mg tablet every morning per medical team  Medical clearance for psychiatric admission  Ongoing by medical  Tobacco abuse  On nicotine gum with ongoing counseling  Class 2 obesity in adult  Being monitored  Primary hypertension  Continue amlodipine 5 mg tablet daily per medical team   Continue hydrochlorothiazide 12.5 mg tablet daily per medical team        Patient Active Problem List   Diagnosis    GERD (gastroesophageal reflux disease)    Medical clearance for psychiatric admission    Schizoaffective disorder,  bipolar type (HCC)    Tobacco abuse    T wave inversion in EKG    Syringoma    Chronic idiopathic constipation    Vitamin B 12 deficiency    Vitamin D deficiency    Confluent and reticulate papillomatosis    Class 2 obesity in adult    Primary hypertension    Elevated hemoglobin A1c    Bilateral lower extremity edema     Review of systems: Unremarkable and clozapine restarted as 25 mg twice daily as he felt more depressed with increasing voices since it was added and still with same threatening voices and being encouraged to drink fluids   assessment  Overall Status: Placed back on clozapine which is being titrated upwards at his request due to voices returning and getting worse and becoming more depressed with voices   certification Statement: The patient will continue to require additional inpatient hospital stay due to ongoing voices that are threatening to mixing lack of response to medications and ECT so far.     Medications:  propranolol 10 mg every 12 hours as as needed for anxiety, Abilify 30 mg mg at bedtime Lexapro 20 mg once a day,  and senna 2 tablets twice a day for constipation clozapine 25 mg at bedtime  All medications reviewed with patient including increasing clozapine to 50 mg and titration upwards  side effects from treatment: None reported   Medication changes   Clozapine increases 50 mg at bedtime and titrated upwards  Medication education   Risks side effects benefits and precautions of medications discussed with patient and he did verbalize an understanding about risks for metabolic syndrome from being on neuroleptics and risk for tardive dyskinesia etc.    Understanding of medications: Has some understanding   Justification for dual anti-psychotics: Not applicable    Non-pharmacological treatments  Continue with individual, group, milieu and occupational therapy using recovery principles and psycho-education about accepting illness and the need for treatment.   to contact aunt  and explore ACT team referral which he never had  ECT to be continued  q 2 weekly for maintenance x 6  Refuses to see a dietician and agrees to do more exercises as he is about 100 lbs overweight   Prolactin level high so Risperdal replaced with Abilify which he has tolerated well so far with prolactin level coming back to normal    Safety  Safety and communication plan established to target dynamic risk factors discussed above.    Discharge Plan back to his aunt but may not be eligible for ACT team since he lost his MA benefits and [facility] to figure out alternatives in place for acting  Interval Progress   Happy to find out that he can return to his aunt's house who has agreed to take him back but his eligibility for ACT team is in question as he lost his MA benefits and the [facility] is trying to see if they can found his ACT team.  Continues to report same threatening voices to kill him and his family which he has been hearing over 2 years which has gotten worse and making him more depressed ever since clozapine was discontinued so that he was resumed as 25 mg at bedtime yesterday.  We will continue to monitor the CK and encouraged to drink plenty of fluids.  Staying to himself still reports some passive death wishes but no active thoughts intent or plans and able to contract for safety.  No behavioral PRNs needed lately.  Still hangs around with a female peer but no inappropriate boundary intrusions reported and staff is keeping close watch.  Has not been aggressive or agitated or threatening or self-abusive    acceptance by patient: Hoping to live with his aunt  Hopefulness in recovery: Living with his son  Involved in reintegration process: Talking with his aunt and his sisters who visit with him from [facility]  trusting in relationship with psychiatrist: Appears to trust  Sleep: good  Appetite: Good  Compliance with Medications: Good  Group attendance: Most of the groups  6/9  Significant events and progress towards goals: Accepting clozapine rechallenge because of worsening voices that makes him more depressed that are still threatening to kill him and his family    Mental Status Exam  Appearance: age appropriate, improved grooming, looks older than stated age, overweight, with hair in dreadlocks casually dressed with a clean shaven face, fairly groomed with good eye contact casually dressed in hospital gown as usual verbal and friendly pleasant still feeling depressed over the voices but happy that he was placed back on clozapine   behavior: Friendly cooperative with good eye contact  speech: normal rate, normal volume, normal pitch  Mood: dysphoric, anxious, continues to report feeling good with the ECT but reports depression is worse since taken off the clozapine   affect: constricted, inappropriate, mood-congruent.  Somewhat constricted with minimal mood reactivity   thought Process: organized, logical, coherent, goal directed, linear, decreased rate of thoughts, slowing of thoughts, negative thinking, impaired abstract reasoning, concrete  Thought Content: paranoid ideation, some paranoia, grandiose ideas, intrusive thoughts, preoccupied, chronic, continues to report paranoia about people threatening to kill him and his family because of the voices.  He believes ECT has helped so that he is not much in his head.  No other delusions elicited.  No current suicidal or homicidal thoughts and no plans verbalized but today reports having passive death wishes because of voices with no plans and able to CFS and it has not changed yet  .  No phobias obsessions compulsions or distorted body perceptions reported.  Preoccupied with wanting to get ECTs more often despite reminding him that it may impair his memory and finally he agreed to such as it is  every 2 weeks  Perceptual Disturbances: Reports same threatening voices but does not appear responding when interviewed but continues to  report having heard the same voices for more than 2 years   Hx Risk Factors: chronic psychiatric problems, chronic anxiety symptoms, chronic psychotic symptoms,    Sensorium: Oriented x 3 spheres and situation  cognition: recent and remote memory grossly intact  Consciousness: alert and awake  Attention: Attention and concentration good  Intellect: appears to be of average intelligence  Insight: intact  Judgement: intact  Motor Activity: no abnormal movements     Vitals  Temp:  [97.9 °F (36.6 °C)-98 °F (36.7 °C)] 97.9 °F (36.6 °C)  HR:  [75-84] 75  Resp:  [17-18] 18  BP: (109-121)/(64-81) 121/75  SpO2:  [97 %-100 %] 97 %  No intake or output data in the 24 hours ending 10/08/24 0550      Lab Results: All labs reviewed and prolactin level came down to normal on 8/1/2024, clozapine level 820 on 9/5/24  Current Facility-Administered Medications   Medication Dose Route Frequency Provider Last Rate    acetaminophen  650 mg Oral Q4H PRN Jordan C Holter, DO      acetaminophen  650 mg Oral Q6H PRN HOLLI Lion      aluminum-magnesium hydroxide-simethicone  30 mL Oral Q4H PRN Jordan C Holter, DO      amLODIPine  5 mg Oral Daily HOLLI Lion      ARIPiprazole  30 mg Oral Daily Bora Rosario MD      Artificial Tears  1 drop Both Eyes Q3H PRN Jordan C Holter, DO      atropine  1 drop Sublingual HS Harjeet Torres, DO      haloperidol lactate  2.5 mg Intramuscular Q4H PRN Max 4/day HOLLI Lion      And    LORazepam  1 mg Intramuscular Q4H PRN Max 4/day HOLLI Lion      And    benztropine  0.5 mg Intramuscular Q4H PRN Max 4/day HOLLI Lion      benztropine  1 mg Intramuscular Q4H PRN Max 6/day Jordan C Holter, DO      haloperidol lactate  5 mg Intramuscular Q4H PRN Max 4/day HOLLI Lion      And    LORazepam  2 mg Intramuscular Q4H PRN Max 4/day HOLLI Lion      And    benztropine  1 mg Intramuscular Q4H PRN Max 4/day HOLLI Lion      benztropine  1 mg Oral Q4H PRN Max 6/day  HOLLI Lion      benztropine  1 mg Oral Q4H PRN Max 6/day Ion C Holter, DO      bisacodyl  10 mg Rectal Daily PRN HOLLI Lion      calcium carbonate  500 mg Oral BID PRN HOLLI Monge      cloZAPine  50 mg Oral HS Bora Rosario MD      cyanocobalamin  1,000 mcg Oral Daily HOLLI Galvan      hydrOXYzine HCL  50 mg Oral Q6H PRN Max 4/day Ion C Holter, DO      Or    diphenhydrAMINE  50 mg Intramuscular Q6H PRN Ion C Holter, DO      hydrOXYzine HCL  50 mg Oral Q6H PRN Max 4/day HOLLI Lion      Or    diphenhydrAMINE  50 mg Intramuscular Q6H PRN HOLLI Lion      diphenhydrAMINE-zinc acetate   Topical BID PRN HOLLI Lion      escitalopram  20 mg Oral Daily HOLLI Lion      famotidine  20 mg Oral BID HOLLI Monge      fluticasone  1 spray Each Nare Daily Brina Guillne MD      glycopyrrolate  1 mg Oral BID (AM & Afternoon) Bora Rosario MD      glycopyrrolate  2 mg Oral HS Bora Rosario MD      haloperidol  1 mg Oral Q6H PRN HOLLI Lion      haloperidol  2.5 mg Oral Q4H PRN Max 4/day HOLLI Lion      haloperidol  5 mg Oral Q4H PRN Max 4/day HOLLI Lion      hydroCHLOROthiazide  12.5 mg Oral Daily HOLLI Galvan      hydrocortisone   Topical 4x Daily PRN HOLLI Lion      hydrOXYzine HCL  100 mg Oral Q6H PRN Max 4/day Ion C Holter, DO      Or    LORazepam  2 mg Intramuscular Q6H PRN Ion C Holter, DO      hydrOXYzine HCL  100 mg Oral Q6H PRN Max 4/day HOLLI Lion      Or    LORazepam  2 mg Intramuscular Q6H PRN HOLLI Lion      hydrOXYzine HCL  25 mg Oral Q6H PRN Max 4/day Ion C Holter, DO      ibuprofen  600 mg Oral Q8H PRN HOLLI Lion      influenza vaccine  0.5 mL Intramuscular Once Bora Rosario MD      loratadine  10 mg Oral Daily Brina Guillen MD      melatonin  3 mg Oral HS PRN Bora Rosario MD      melatonin  9 mg Oral HS Bora Rosario MD      metFORMIN  500  mg Oral BID With Meals HOLLI Galvan      methocarbamol  500 mg Oral Q6H PRN HOLLI Lion      nicotine polacrilex  2 mg Oral Q4H PRN Bora Rosario MD      OLANZapine  5 mg Oral Q4H PRN Max 3/day [facility] Holter, DO      Or    OLANZapine  2.5 mg Intramuscular Q4H PRN Max 3/day [facility] Holter, DO      OLANZapine  5 mg Oral Q3H PRN Max 3/day [facility] Holter, DO      Or    OLANZapine  5 mg Intramuscular Q3H PRN Max 3/day [facility] Holter, DO      OLANZapine  2.5 mg Oral Q4H PRN Max 6/day [facility] Holter, DO      ondansetron  4 mg Oral Q6H PRN HOLLI Lion      pantoprazole  20 mg Oral QAM [facility] Holter, DO      polyethylene glycol  17 g Oral Daily PRN HOLLI Ghotra      propranolol  10 mg Oral Q12H KAYLIE HOLLI Loin      senna-docusate sodium  1 tablet Oral Daily PRN Jordan C Holter, DO      senna-docusate sodium  2 tablet Oral HS Melvina Ambron, DO      traZODone  50 mg Oral HS PRN HOLLI Lion      white petrolatum-mineral oil   Topical TID PRN HOLLI Lion         Counseling / Coordination of Care: Total floor / unit time spent today 15 minutes. Greater than 50% of total time was spent with the patient and / or family counseling and / or somewhat receptive to supportive listening and teaching positive coping skills to deal with symptom mangement.     Patient's Rights, confidentiality and exceptions to confidentiality, use of automated medical record, Behavioral Health Services staff access to medical record, and consent to treatment reviewed.    This note has been dictated and hence there may be problems with punctuation, spelling and formatting and if anyone has any concerns please address them to Dr. Rosario   This note is not shared with patient due to potential for making patient's condition worse by knowing the content of the note.

## 2024-10-08 NOTE — NURSING NOTE
"Patient is pleasant and cooperative. He states he did not sleep well. He is complaint with medications. He states he has auditory hallucinations. He states \"I haven't been very hungry.' His appetite is decreased. He did not eat breakfast however ate lunch.   "

## 2024-10-08 NOTE — PLAN OF CARE
Problem: Alteration in Thoughts and Perception  Goal: Treatment Goal: Gain control of psychotic behaviors/thinking, reduce/eliminate presenting symptoms and demonstrate improved reality functioning upon discharge  Outcome: Progressing  Goal: Verbalize thoughts and feelings  Description: Interventions:  - Promote a nonjudgmental and trusting relationship with the patient through active listening and therapeutic communication  - Assess patient's level of functioning, behavior and potential for risk  - Engage patient in 1 on 1 interactions  - Encourage patient to express fears, feelings, frustrations, and discuss symptoms    - [place] patient to reality, help patient recognize reality-based thinking   - Administer medications as ordered and assess for potential side effects  - Provide the patient education related to the signs and symptoms of the illness and desired effects of prescribed medications  Outcome: Not Progressing  Goal: Refrain from acting on delusional thinking/internal stimuli  Description: Interventions:  - Monitor patient closely, per order   - Utilize least restrictive measures   - Set reasonable limits, give positive feedback for acceptable   - Administer medications as ordered and monitor of potential side effects  Outcome: Progressing  Goal: Agree to be compliant with medication regime, as prescribed and report medication side effects  Description: Interventions:  - Offer appropriate PRN medication and supervise ingestion; conduct AIMS, as needed   Outcome: Progressing  Goal: Attend and participate in unit activities, including therapeutic, recreational, and educational groups  Description: Interventions:  -Encourage Visitation and family involvement in care  Outcome: Not Progressing  Goal: Recognize dysfunctional thoughts, communicate reality-based thoughts at the time of discharge  Description: Interventions:  - Provide medication and psycho-education to assist patient in compliance and  developing insight into his/her illness   Outcome: Progressing  Goal: Complete daily ADLs, including personal hygiene independently, as able  Description: Interventions:  - Observe, teach, and assist patient with ADLS  - Monitor and promote a balance of rest/activity, with adequate nutrition and elimination   Outcome: Progressing     Problem: Ineffective Coping  Goal: Identifies ineffective coping skills  Outcome: Progressing  Goal: Demonstrates healthy coping skills  Outcome: Progressing  Goal: Participates in unit activities  Description: Interventions:  - Provide therapeutic environment   - Provide required programming   - Redirect inappropriate behaviors   Outcome: Not Progressing     Problem: Depression  Goal: Verbalize thoughts and feelings  Description: Interventions:  - Assess and re-assess patient's level of risk   - Engage patient in 1:1 interactions, daily, for a minimum of 15 minutes   - Encourage patient to express feelings, fears, frustrations, hopes   Outcome: Not Progressing  Goal: Refrain from harming self  Description: Interventions:  - Monitor patient closely, per order   - Supervise medication ingestion, monitor effects and side effects   Outcome: Progressing  Goal: Refrain from self-neglect  Outcome: Progressing  Goal: Complete daily ADLs, including personal hygiene independently, as able  Description: Interventions:  - Observe, teach, and assist patient with ADLS  -  Monitor and promote a balance of rest/activity, with adequate nutrition and elimination   Outcome: Progressing     Problem: Anxiety  Goal: Anxiety is at manageable level  Description: Interventions:  - Assess and monitor patient's anxiety level.   - Monitor for signs and symptoms (heart palpitations, chest pain, shortness of breath, headaches, nausea, feeling jumpy, restlessness, irritable, apprehensive).   - Collaborate with interdisciplinary team and initiate plan and interventions as ordered.  - [place] patient to  unit/surroundings  - Explain treatment plan  - Encourage participation in care  - Encourage verbalization of concerns/fears  - Identify coping mechanisms  - Assist in developing anxiety-reducing skills  - Administer/offer alternative therapies  - Limit or eliminate stimulants  Outcome: Not Progressing     Problem: Alteration in Orientation  Goal: Treatment Goal: Demonstrate a reduction of confusion and improved orientation to person, place, time and/or situation upon discharge, according to optimum baseline/ability  Outcome: Progressing  Goal: Interact with staff daily  Description: Interventions:  - Assess and re-assess patient's level of orientation  - Engage patient in 1 on 1 interactions, daily, for a minimum of 15 minutes   - Establish rapport/trust with patient   Outcome: Progressing  Goal: Cooperate with recommended testing/procedures  Description: Interventions:  - Determine need for ancillary testing  - Observe for mental status changes  - Implement falls/precaution protocol   Outcome: Progressing  Goal: Attend and participate in unit activities, including therapeutic, recreational, and educational groups  Description: Interventions:  - Provide therapeutic and educational activities daily, encourage attendance and participation, and document same in the medical record   - Provide appropriate opportunities for reminiscence   - Provide a consistent daily routine   - Encourage family contact/visitation   Outcome: Progressing  Goal: Complete daily ADLs, including personal hygiene independently, as able  Description: Interventions:  - Observe, teach, and assist patient with ADLS  Outcome: Progressing

## 2024-10-08 NOTE — PROGRESS NOTES
10/08/24 0748   Team Meeting   Meeting Type Daily Rounds   Team Members Present   Team Members Present Physician;Nurse;;Other (Discipline and Name)   Physician Team Member Holter, Thomas   Nursing Team Member Claudia   Social Work Team Member Jose J ORTEGA   Patient/Family Present   Patient Present No   Patient's Family Present No     Mehul Pharm D  Groups Participation  6/9.   Patient's compliant with medications. He's engaged in his treatment. Plan to return to live with family when ACT services are established. Patient reports depression at times. Reports  Clozaril restarted, increased to 50mg at HS.

## 2024-10-09 PROCEDURE — 99232 SBSQ HOSP IP/OBS MODERATE 35: CPT | Performed by: PSYCHIATRY & NEUROLOGY

## 2024-10-09 RX ORDER — CLOZAPINE 25 MG/1
75 TABLET ORAL
Status: DISCONTINUED | OUTPATIENT
Start: 2024-10-09 | End: 2024-10-10

## 2024-10-09 RX ADMIN — NICOTINE POLACRILEX 2 MG: 2 GUM, CHEWING ORAL at 17:13

## 2024-10-09 RX ADMIN — FLUTICASONE PROPIONATE 1 SPRAY: 50 SPRAY, METERED NASAL at 08:56

## 2024-10-09 RX ADMIN — SENNOSIDES AND DOCUSATE SODIUM 2 TABLET: 8.6; 5 TABLET ORAL at 21:21

## 2024-10-09 RX ADMIN — GLYCOPYRROLATE 1 MG: 1 TABLET ORAL at 14:02

## 2024-10-09 RX ADMIN — OLANZAPINE 5 MG: 5 TABLET, FILM COATED ORAL at 19:43

## 2024-10-09 RX ADMIN — GLYCOPYRROLATE 2 MG: 1 TABLET ORAL at 21:22

## 2024-10-09 RX ADMIN — ESCITALOPRAM OXALATE 20 MG: 10 TABLET, FILM COATED ORAL at 08:56

## 2024-10-09 RX ADMIN — METFORMIN HYDROCHLORIDE 500 MG: 500 TABLET, FILM COATED ORAL at 08:56

## 2024-10-09 RX ADMIN — MELATONIN TAB 3 MG 9 MG: 3 TAB at 21:21

## 2024-10-09 RX ADMIN — GLYCOPYRROLATE 1 MG: 1 TABLET ORAL at 08:56

## 2024-10-09 RX ADMIN — PROPRANOLOL HYDROCHLORIDE 10 MG: 10 TABLET ORAL at 08:56

## 2024-10-09 RX ADMIN — ARIPIPRAZOLE 30 MG: 15 TABLET ORAL at 08:56

## 2024-10-09 RX ADMIN — METFORMIN HYDROCHLORIDE 500 MG: 500 TABLET, FILM COATED ORAL at 17:09

## 2024-10-09 RX ADMIN — AMLODIPINE BESYLATE 5 MG: 5 TABLET ORAL at 08:56

## 2024-10-09 RX ADMIN — CLOZAPINE 75 MG: 25 TABLET ORAL at 21:21

## 2024-10-09 RX ADMIN — HYDROCHLOROTHIAZIDE 12.5 MG: 12.5 TABLET ORAL at 08:56

## 2024-10-09 RX ADMIN — FAMOTIDINE 20 MG: 20 TABLET ORAL at 08:56

## 2024-10-09 RX ADMIN — NICOTINE POLACRILEX 2 MG: 2 GUM, CHEWING ORAL at 08:56

## 2024-10-09 RX ADMIN — CYANOCOBALAMIN TAB 1000 MCG 1000 MCG: 1000 TAB at 08:56

## 2024-10-09 RX ADMIN — LORATADINE 10 MG: 10 TABLET ORAL at 08:56

## 2024-10-09 RX ADMIN — FAMOTIDINE 20 MG: 20 TABLET ORAL at 17:09

## 2024-10-09 RX ADMIN — NICOTINE POLACRILEX 2 MG: 2 GUM, CHEWING ORAL at 12:47

## 2024-10-09 RX ADMIN — PANTOPRAZOLE SODIUM 20 MG: 20 TABLET, DELAYED RELEASE ORAL at 08:56

## 2024-10-09 NOTE — NURSING NOTE
Pt received PRN Zyprexa 5 mg at 1943 as ordered for moderate agitation. Pt reports increased frequency and volume of AH. Pt observed responding to IS in the hallway by T. Broset score 2. Will monitor for effectiveness.

## 2024-10-09 NOTE — SOCIAL WORK
This writer met with patient for an individual therapy session. Patient reports he had increased depression related to the hallucinations. He reports they tell him, he will die, he's not a good person, he's in danger etc. He denies they are commanding in nature. He reports feeling fearful of his eventual discharge of the hospital because of the voices. He's denied prior OP therapy but is in agreement with a referral for this service. He's in agreement with individual therapy with this writer during his hospital stay. He reports he's close with his family and they are supportive.

## 2024-10-09 NOTE — ASSESSMENT & PLAN NOTE
Reviewed 10/9/24    Continue senna-docusate sodium two tablets before bed per medical team    No associated orders from this encounter found during lookback period of 72 hours.

## 2024-10-09 NOTE — PLAN OF CARE
Problem: Alteration in Thoughts and Perception  Goal: Verbalize thoughts and feelings  Description: Interventions:  - Promote a nonjudgmental and trusting relationship with the patient through active listening and therapeutic communication  - Assess patient's level of functioning, behavior and potential for risk  - Engage patient in 1 on 1 interactions  - Encourage patient to express fears, feelings, frustrations, and discuss symptoms    - [place] patient to reality, help patient recognize reality-based thinking   - Administer medications as ordered and assess for potential side effects  - Provide the patient education related to the signs and symptoms of the illness and desired effects of prescribed medications  Outcome: Progressing  Goal: Refrain from acting on delusional thinking/internal stimuli  Description: Interventions:  - Monitor patient closely, per order   - Utilize least restrictive measures   - Set reasonable limits, give positive feedback for acceptable   - Administer medications as ordered and monitor of potential side effects  Outcome: Progressing  Goal: Agree to be compliant with medication regime, as prescribed and report medication side effects  Description: Interventions:  - Offer appropriate PRN medication and supervise ingestion; conduct AIMS, as needed   Outcome: Progressing  Goal: Attend and participate in unit activities, including therapeutic, recreational, and educational groups  Description: Interventions:  -Encourage Visitation and family involvement in care  Outcome: Progressing  Goal: Complete daily ADLs, including personal hygiene independently, as able  Description: Interventions:  - Observe, teach, and assist patient with ADLS  - Monitor and promote a balance of rest/activity, with adequate nutrition and elimination   Outcome: Progressing     Problem: Ineffective Coping  Goal: Demonstrates healthy coping skills  Outcome: Progressing  Goal: Participates in unit  activities  Description: Interventions:  - Provide therapeutic environment   - Provide required programming   - Redirect inappropriate behaviors   Outcome: Progressing     Problem: Depression  Goal: Refrain from harming self  Description: Interventions:  - Monitor patient closely, per order   - Supervise medication ingestion, monitor effects and side effects   Outcome: Progressing     Problem: Anxiety  Goal: Anxiety is at manageable level  Description: Interventions:  - Assess and monitor patient's anxiety level.   - Monitor for signs and symptoms (heart palpitations, chest pain, shortness of breath, headaches, nausea, feeling jumpy, restlessness, irritable, apprehensive).   - Collaborate with interdisciplinary team and initiate plan and interventions as ordered.  - [place] patient to unit/surroundings  - Explain treatment plan  - Encourage participation in care  - Encourage verbalization of concerns/fears  - Identify coping mechanisms  - Assist in developing anxiety-reducing skills  - Administer/offer alternative therapies  - Limit or eliminate stimulants  Outcome: Progressing

## 2024-10-09 NOTE — ASSESSMENT & PLAN NOTE
"Reviewed 10/9/24    Patient remains with ongoing depressive symptoms and ongoing auditory hallucinations of \"loud, angry, irritating voices.\" Remains with paranoia surrounding these voices and fears that someone is out to harm him.    Increased Clozapine to 75 mg at bedtime for psychosis  Continue to obtain CBC (weekly), BNP (every 2 weeks), CK (weekly) for ongoing clozapine monitoring    Continue Abilify 30 mg daily for psychosis  Continue escitalopram 20 mg tablet daily for depression and anxiety  Continue senna-docusate sodium two 50 mg tablets daily before bed for constipation    No associated orders from this encounter found during lookback period of 72 hours.    "

## 2024-10-09 NOTE — NURSING NOTE
Alberto was on the phone in the beginning of the shift. Visible on the unit ambulating with male peer.  Social at times with female peer. Pleasant and cooperative upon approach. Admits to anxiety 3/4 and depression 9/10. Depression worse due to hearing voices.  He was seen by T's gesturing and talking to himself when ambulating in the hallway. Flat affect. He mentioned that he does not have much of an appetite. Took medication without difficulty. Participated in PM Walking group. Behavior controlled.  Continue to monitor. Precautions maintained.

## 2024-10-09 NOTE — PROGRESS NOTES
"  Progress Note - Behavioral Health   Name: Alberto Berumen 28 y.o. male I MRN: [id number]  Unit/Bed#: [facility] 108-02 I Date of Admission: 3/29/2024   Date of Service: 10/9/2024 I Hospital Day: 194     Assessment & Plan  Schizoaffective disorder, bipolar type (HCC)  Reviewed 10/9/24    Patient remains with ongoing depressive symptoms and ongoing auditory hallucinations of \"loud, angry, irritating voices.\" Remains with paranoia surrounding these voices and fears that someone is out to harm him.    Increased Clozapine to 75 mg at bedtime for psychosis  Continue to obtain CBC (weekly), BNP (every 2 weeks), CK (weekly) for ongoing clozapine monitoring    Continue Abilify 30 mg daily for psychosis  Continue escitalopram 20 mg tablet daily for depression and anxiety  Continue senna-docusate sodium two 50 mg tablets daily before bed for constipation    No associated orders from this encounter found during lookback period of 72 hours.    Chronic idiopathic constipation  Reviewed 10/9/24    Continue senna-docusate sodium two tablets before bed per medical team    No associated orders from this encounter found during lookback period of 72 hours.  GERD (gastroesophageal reflux disease)  Reviewed 10/9/24    Continue famotidine 20 mg tablet BID per medical team  Continue glycopyrrolate 1 mg tablet BID AM and afternoon per medical team  Continue glycopyrrolate 2 mg tablet before bed per medical team  Continue pantoprazole EC 20 mg tablet every morning per medical team  Medical clearance for psychiatric admission  Reviewed 10/9/24    Ongoing by medical  Tobacco abuse  Reviewed 10/9/24    Continue to encourage cessation upon discharge      Plan:  As noted above  Currently on 201 commitment status  Continue to promote patient participation in therapeutic milieu.  Continue medical management per medicine.  Continue behavioral health checks q.7 minutes.   Continue vitals per behavioral health unit protocol.  Safety: Safety and " "communication plan established to target dynamic risk factors discussed above.   Discharge and disposition: At this time plans are being made for the patient to discharge to his aunt's house following clinical stabilization    SUBJECTIVE     Patient evaluated this a.m. for continuity of care. Patient was discussed at length with nursing and treatment team. Per nursing, on the inpatient psychiatric unit patient continues to make slow progress.  He continues to remain with symptoms of psychosis and continues to endorse auditory hallucinations derogatory in terms of content.  Yesterday afternoon the patient was noted to be visible in the milieu, polite and cooperative with staff and peers.  In the evening he was noted to be more isolative, stating that he was feeling very bothered by voices.  On the inpatient unit patient has remained medication and meal compliant.  Per nursing report he attended 7 out of 10 groups.    Today, Alberto reports feeling \"the same.\"  He reports that he continues to struggle with ongoing symptoms of depression and anxiety in the setting of ongoing psychosis.  He continues to rate his depression as a 9 out of 10 and his anxiety as a 7 out of 10 (with 10 being the most severe symptomatology).  He reports that he has been doing his best to continue to stay active and attend groups.  On the inpatient unit Alberto reports at this time he continues to experience derogatory auditory hallucinations \"all the time\" and he states these are unchanged in severity in the last 24 hours.  He reports they continue to say mean and negative comments towards him.  He denies command auditory hallucinations.    At the present moment, Alberto reports that he slept better last night overall.  He continues to feel tired today.    At the present moment, Alberto remains with passive suicidal ideation with no active plan or intent to harm himself or others.  He denies homicidal ideation.  He remains with ongoing " derogatory auditory hallucinations.  He denies visual hallucinations.      PSYCHIATRIC REVIEW OF SYSTEMS     Behavior over the last 24 hours:  unchanged  Sleep: improving, remains with difficulty falling asleep  Appetite: remains decreased  Medication side effects: denies, reports he has been going to the bathroom and having regular bowel movements    Progress Toward Goals: Slow progress, as evidenced by their participation in individual, social and therapeutic milieu in addition to adherence to their medication regimen.  Patient Acceptance: Fair  Patient Understanding: Limited  Patient Involvement in Reintegration Process: Patient remains in contact with his aunt  Patient's Therapeutic Relationship with Psychiatrist: Trusting  Patient's Optimism Regarding Recovery: Hopeful to discharge to his aunt's house following the coordination of ACT services  Group Attendance: 7/10    REVIEW OF SYSTEMS   Review of systems: no complaints    OBJECTIVE     Vital Signs in Past 24 Hours:  Temp:  [95.4 °F (35.2 °C)-97.5 °F (36.4 °C)] 95.4 °F (35.2 °C)  HR:  [58-81] 76  BP: (120-144)/(58-75) 144/75  Resp:  [16-18] 16  SpO2:  [100 %] 100 %  O2 Device: None (Room air)    Intake/Output in Past 24 hours:  No intake/output data recorded.  No intake/output data recorded.        Laboratory Results:  I have personally reviewed all pertinent laboratory/tests results.  Most Recent Labs:   Lab Results   Component Value Date    WBC 10.28 (H) 10/08/2024    RBC 5.65 (H) 10/08/2024    HGB 13.6 10/08/2024    HCT 42.1 10/08/2024     10/08/2024    RDW 14.3 10/08/2024    NEUTROABS 5.38 10/08/2024    SODIUM 139 10/01/2024    K 3.7 10/01/2024     10/01/2024    CO2 28 10/01/2024    BUN 10 10/01/2024    CREATININE 0.98 10/01/2024    GLUC 111 10/01/2024    GLUF 83 09/30/2024    CALCIUM 10.3 (H) 10/01/2024    AST 26 09/30/2024    ALT 42 09/30/2024    ALKPHOS 64 09/30/2024    TP 6.6 09/30/2024    ALB 3.8 09/30/2024    TBILI 0.47 09/30/2024     CHOLESTEROL 156 03/14/2024    HDL 44 03/14/2024    TRIG 133 03/14/2024    LDLCALC 85 03/14/2024    NONHDLC 112 03/14/2024    LITHIUM 0.61 01/09/2024    DBS9SSZQZYBE 1.062 11/15/2023    HGBA1C 5.0 04/01/2024    EAG 97 04/01/2024       Behavioral Health Medications: all current active meds have been reviewed.    Current Facility-Administered Medications   Medication Dose Route Frequency Provider Last Rate    acetaminophen  650 mg Oral Q4H PRN Jordan C Holter, DO      acetaminophen  650 mg Oral Q6H PRN HOLLI Lion      aluminum-magnesium hydroxide-simethicone  30 mL Oral Q4H PRN Jordan C Holter,       amLODIPine  5 mg Oral Daily HOLLI Lion      ARIPiprazole  30 mg Oral Daily Bora Rosario MD      Artificial Tears  1 drop Both Eyes Q3H PRN Jordan C Holter, DO      atropine  1 drop Sublingual HS Harjeet Torres,       haloperidol lactate  2.5 mg Intramuscular Q4H PRN Max 4/day HOLLI Lion      And    LORazepam  1 mg Intramuscular Q4H PRN Max 4/day HOLLI Lion      And    benztropine  0.5 mg Intramuscular Q4H PRN Max 4/day HOLLI Lion      benztropine  1 mg Intramuscular Q4H PRN Max 6/day Jordan C Holter, DO      haloperidol lactate  5 mg Intramuscular Q4H PRN Max 4/day HOLLI Lion      And    LORazepam  2 mg Intramuscular Q4H PRN Max 4/day HOLLI Lion      And    benztropine  1 mg Intramuscular Q4H PRN Max 4/day HOLLI Lion      benztropine  1 mg Oral Q4H PRN Max 6/day HOLLI Lion      benztropine  1 mg Oral Q4H PRN Max 6/day Jordan C Holter,       bisacodyl  10 mg Rectal Daily PRN HOLLI Lion      calcium carbonate  500 mg Oral BID PRN HOLLI Monge      cloZAPine  50 mg Oral HS Bora Rosario MD      cyanocobalamin  1,000 mcg Oral Daily HOLLI Galvan      hydrOXYzine HCL  50 mg Oral Q6H PRN Max 4/day Ion C Holter, DO      Or    diphenhydrAMINE  50 mg Intramuscular Q6H PRN Ion FISCHER Holter, DO      hydrOXYzine HCL   50 mg Oral Q6H PRN Max 4/day HOLLI Lion      Or    diphenhydrAMINE  50 mg Intramuscular Q6H PRN HOLLI Lion      diphenhydrAMINE-zinc acetate   Topical BID PRN HOLLI Lion      escitalopram  20 mg Oral Daily HOLLI Lion      famotidine  20 mg Oral BID Eileen Holliday HOLLI Jensen      fluticasone  1 spray Each Nare Daily Brina Guillen MD      glycopyrrolate  1 mg Oral BID (AM & Afternoon) Bora Rosario MD      glycopyrrolate  2 mg Oral HS Bora Rosario MD      haloperidol  1 mg Oral Q6H PRN HOLLI Lion      haloperidol  2.5 mg Oral Q4H PRN Max 4/day HOLLI Lion      haloperidol  5 mg Oral Q4H PRN Max 4/day HOLLI Lion      hydroCHLOROthiazide  12.5 mg Oral Daily HOLLI Galvan      hydrocortisone   Topical 4x Daily PRN HOLLI Lion      hydrOXYzine HCL  100 mg Oral Q6H PRN Max 4/day Ion  Holter, DO      Or    LORazepam  2 mg Intramuscular Q6H PRN Ion C Holter, DO      hydrOXYzine HCL  100 mg Oral Q6H PRN Max 4/day HOLLI Lion      Or    LORazepam  2 mg Intramuscular Q6H PRN HOLLI Lion      hydrOXYzine HCL  25 mg Oral Q6H PRN Max 4/day Ion C Holter, DO      ibuprofen  600 mg Oral Q8H PRN HOLLI Lion      influenza vaccine  0.5 mL Intramuscular Once Bora Rosario MD      loratadine  10 mg Oral Daily Brina Guillen MD      melatonin  3 mg Oral HS PRN Bora Rosario MD      melatonin  9 mg Oral HS Bora Rosario MD      metFORMIN  500 mg Oral BID With Meals HOLLI Galvan      methocarbamol  500 mg Oral Q6H PRN HOLLI Lion      nicotine polacrilex  2 mg Oral Q4H PRN Bora Rosario MD      OLANZapine  5 mg Oral Q4H PRN Max 3/day Ion C Holter, DO      Or    OLANZapine  2.5 mg Intramuscular Q4H PRN Max 3/day Ion C Holter, DO      OLANZapine  5 mg Oral Q3H PRN Max 3/day Ion C Holter, DO      Or    OLANZapine  5 mg Intramuscular Q3H PRN Max 3/day Ion FISCHER Holter, DO      OLANZapine  2.5 mg Oral Q4H PRN  "Max 6/day Jordan C Holter, DO      ondansetron  4 mg Oral Q6H PRN HOLLI Lion      pantoprazole  20 mg Oral QAM Jordan C Holter, DO      polyethylene glycol  17 g Oral Daily PRN HOLLI Ghotra      propranolol  10 mg Oral Q12H [facility] HOLLI Lion      senna-docusate sodium  1 tablet Oral Daily PRN Jordan C Holter, DO      senna-docusate sodium  2 tablet Oral HS Melvina Ambron, DO      traZODone  50 mg Oral HS PRN HOLLI Lion      white petrolatum-mineral oil   Topical TID PRN HOLLI Lion         Risks, benefits and possible side effects of Medications:   Risks, benefits, and possible side effects of medications explained to patient and patient verbalizes understanding.      Dual Antipsychotic Rationale: At this time the patient is currently on 2 antipsychotics due to inability to control symptomatology on a singular antipsychotic agent and persistent psychosis that has been refractory to multiple treatment modalities    Mental Status Evaluation:  Appearance:  Age-appropriate, casually dressed, overweight, fair grooming and hygiene, limited eye contact, no acute distress   Behavior:  Polite, cooperative   Speech:  Normal rate, soft, fluent, clear, coherent   Mood:  \"The same\"   Affect:  Blunted, depressed   Thought Process:  [place], linear, goal directed   Associations: [place] associations   Thought Content:  Remains with some paranoia in the context of ongoing struggles with auditory hallucinations   Perceptual Disturbances: Continues to experience auditory hallucinations that the [place] derogatory comments \"all the time.\"  He denies visual hallucinations.  He appears internally preoccupied.   Risk Potential: Suicidal ideation -patient remains with passive suicidal ideations without any plan or intent to harm himself  Homicidal ideation -patient denies  Potential for aggression -no   Sensorium:  Oriented to person, place, time and date   Memory:  recent and remote memory grossly " intact   Consciousness:  alert and awake   Attention/Concentration: attention span and concentration are age appropriate   Insight:  limited   Judgment: limited   Gait/Station: normal gait/station, normal balance   Motor Activity: no abnormal movements     Recommended Treatment:   See above for assessment and plan.    Inpatient Psychiatric Certification:  Patient is not at goal. They are not yet ready for discharge. The patient's condition currently requires active psychopharmacological medication management, interdisciplinary coordination with case management, and the utilization of adjunctive milieu and group therapy to augment psychopharmacological efficacy. The patient's risk of morbidity, and progression or decompensation of psychiatric disease, is higher without this current treatment. Based upon physical, mental and social evaluations, I certify that inpatient psychiatric services are medically necessary for this patient for a duration of >2 midnights for the treatment of Schizoaffective disorder, bipolar type (HCC)  Available alternative community resources do not meet the patient's mental health care needs.  I further attest that an established written individualized plan of care has been implemented and is outlined in the patient's medical records.    Counseling/Coordination of Care    Total unit time spent today was greater than 15 minutes. Greater than 50% of total time was spent with the patient and/or patient's relatives and/or coordination of patient's care.     Patient's rights, confidentiality, exceptions to confidentiality, use of electronic medical record including appropriate staff access to medical record regarding behavioral health services and consent to treatment were reviewed.    Note Share:     This note was not shared with the patient due to reasonable likelihood of causing patient harm     Hardik So MD   Psychiatry, PGY-4

## 2024-10-09 NOTE — PROGRESS NOTES
10/09/24 0755   Team Meeting   Meeting Type Daily Rounds   Team Members Present   Team Members Present Physician;Nurse;;Other (Discipline and Name)   Physician Team Member Abraham   Nursing Team Member Claudia   Social Work Team Member Jose J ORTEGA   Other (Discipline and Name) Wang PCM   Patient/Family Present   Patient Present No   Patient's Family Present No     Groups Participation  7/10.   Patient's compliant with medications. Patient's engaged in treatment. Patient's to be discharged to family once ACT services is available. Patient reports AH. Increasing Clozaril 75mg QHS.

## 2024-10-09 NOTE — NURSING NOTE
More isolative tonight. In bed sleeping. Reports he does not feel well -- the voices are really bothering him. Compliant with meds and meals. Cooperative appears sad / depressed. Denies HI/SI.Emotional support given.

## 2024-10-09 NOTE — NURSING NOTE
"Pt is accepting of medications without incidence and meal compliant. Pt is visible intermittently, social with select few peers and attends select groups. Pt reports depression and AH, but denies all other s/s currently. Pt has a flat affect, but at times smiles in conversation and jokes with staff. Pt states \" I'm just trying to do what I have to\". No new concerns.   "

## 2024-10-10 PROCEDURE — 99232 SBSQ HOSP IP/OBS MODERATE 35: CPT | Performed by: PSYCHIATRY & NEUROLOGY

## 2024-10-10 RX ORDER — FAMOTIDINE 20 MG/1
20 TABLET, FILM COATED ORAL 2 TIMES DAILY PRN
Status: DISCONTINUED | OUTPATIENT
Start: 2024-10-10 | End: 2025-03-14 | Stop reason: HOSPADM

## 2024-10-10 RX ORDER — CLOZAPINE 100 MG/1
100 TABLET ORAL
Status: DISCONTINUED | OUTPATIENT
Start: 2024-10-10 | End: 2024-10-11

## 2024-10-10 RX ORDER — FLUTICASONE PROPIONATE 50 MCG
1 SPRAY, SUSPENSION (ML) NASAL DAILY PRN
Status: DISCONTINUED | OUTPATIENT
Start: 2024-10-10 | End: 2025-03-14 | Stop reason: HOSPADM

## 2024-10-10 RX ADMIN — ARIPIPRAZOLE 30 MG: 15 TABLET ORAL at 09:11

## 2024-10-10 RX ADMIN — NICOTINE POLACRILEX 2 MG: 2 GUM, CHEWING ORAL at 17:27

## 2024-10-10 RX ADMIN — SENNOSIDES AND DOCUSATE SODIUM 2 TABLET: 8.6; 5 TABLET ORAL at 21:11

## 2024-10-10 RX ADMIN — GLYCOPYRROLATE 1 MG: 1 TABLET ORAL at 15:38

## 2024-10-10 RX ADMIN — PROPRANOLOL HYDROCHLORIDE 10 MG: 10 TABLET ORAL at 09:13

## 2024-10-10 RX ADMIN — ATROPINE SULFATE 1 DROP: 10 SOLUTION/ DROPS OPHTHALMIC at 21:12

## 2024-10-10 RX ADMIN — CLOZAPINE 100 MG: 100 TABLET ORAL at 21:11

## 2024-10-10 RX ADMIN — PROPRANOLOL HYDROCHLORIDE 10 MG: 10 TABLET ORAL at 21:11

## 2024-10-10 RX ADMIN — FAMOTIDINE 20 MG: 20 TABLET, FILM COATED ORAL at 09:12

## 2024-10-10 RX ADMIN — AMLODIPINE BESYLATE 5 MG: 5 TABLET ORAL at 09:11

## 2024-10-10 RX ADMIN — MELATONIN TAB 3 MG 9 MG: 3 TAB at 21:11

## 2024-10-10 RX ADMIN — ESCITALOPRAM OXALATE 20 MG: 10 TABLET, FILM COATED ORAL at 09:11

## 2024-10-10 RX ADMIN — NICOTINE POLACRILEX 2 MG: 2 GUM, CHEWING ORAL at 12:39

## 2024-10-10 RX ADMIN — LORATADINE 10 MG: 10 TABLET ORAL at 09:13

## 2024-10-10 RX ADMIN — GLYCOPYRROLATE 1 MG: 1 TABLET ORAL at 09:12

## 2024-10-10 RX ADMIN — OLANZAPINE 5 MG: 5 TABLET, FILM COATED ORAL at 10:46

## 2024-10-10 RX ADMIN — GLYCOPYRROLATE 2 MG: 1 TABLET ORAL at 21:11

## 2024-10-10 RX ADMIN — PANTOPRAZOLE SODIUM 20 MG: 20 TABLET, DELAYED RELEASE ORAL at 09:12

## 2024-10-10 NOTE — NURSING NOTE
Pt in bed asleep, observed eyes closed, and chest movement noted. Continuous safety checks maintained. No unmet needs at this time. Will continue to monitor patient needs, sleep pattern, and behaviors.     0627: Patient has slept all night, 7+ hours of uninterrupted sleep

## 2024-10-10 NOTE — NURSING NOTE
Pt reports PRN Zyprexa 5 mg was partially effective for agitation. Pt is now resting in bed at this time.

## 2024-10-10 NOTE — ASSESSMENT & PLAN NOTE
Reviewed 10/10/24    Continue famotidine 20 mg tablet BID per medical team  Continue glycopyrrolate 1 mg tablet BID AM and afternoon per medical team  Continue glycopyrrolate 2 mg tablet before bed per medical team  Continue pantoprazole EC 20 mg tablet every morning per medical team

## 2024-10-10 NOTE — NURSING NOTE
Pt is isolative to self and room. Poor appetite today. Took medications without incidence. Pt is polite and cooperative, but quiet and withdrawn. Attended 4/11 groups. Reports increased depression due to increased volume and frequency of AH that threaten to kill him and his family. Emotional support provided. Denies SI/HI. No behavioral issues. Continuous safety checks maintained.

## 2024-10-10 NOTE — PROGRESS NOTES
10/10/24 0744   Team Meeting   Meeting Type Daily Rounds   Team Members Present   Team Members Present Physician;Nurse;;Other (Discipline and Name)   Physician Team Member Abraham   Nursing Team Member Claudia   Social Work Team Member Jose J ORTEGA   Other (Discipline and Name) Jeremias Grace MS    Patient/Family Present   Patient Present No   Patient's Family Present No     Groups Participation  3/9.   Patient's compliant with medications. Patient's depressed, reports AH. Discharge plan to return to family with ACT services. Decreased sleep. Appears to respond to internal stimuli. PRN Zyprexa PO for AH. Clozaril increasing to 100mg HS.

## 2024-10-10 NOTE — NURSING NOTE
"Patient is quiet and withdrawn. He stated \"I am stressed.I am trying\" He states he is having auditory hallucinations. Patient is visible intermittently on the unit. Patient encouraged to attend groups. Keep his time occupied. He attended one group.   "

## 2024-10-10 NOTE — NURSING NOTE
Pt reports PRN Zyprexa 5 mg had minimal effect. Pt reports he is walking around unit praying to manage stress. Pt offers no complaints at this time.

## 2024-10-10 NOTE — PLAN OF CARE
Problem: Alteration in Thoughts and Perception  Goal: Verbalize thoughts and feelings  Description: Interventions:  - Promote a nonjudgmental and trusting relationship with the patient through active listening and therapeutic communication  - Assess patient's level of functioning, behavior and potential for risk  - Engage patient in 1 on 1 interactions  - Encourage patient to express fears, feelings, frustrations, and discuss symptoms    - [place] patient to reality, help patient recognize reality-based thinking   - Administer medications as ordered and assess for potential side effects  - Provide the patient education related to the signs and symptoms of the illness and desired effects of prescribed medications  Outcome: Progressing  Goal: Refrain from acting on delusional thinking/internal stimuli  Description: Interventions:  - Monitor patient closely, per order   - Utilize least restrictive measures   - Set reasonable limits, give positive feedback for acceptable   - Administer medications as ordered and monitor of potential side effects  Outcome: Progressing  Goal: Agree to be compliant with medication regime, as prescribed and report medication side effects  Description: Interventions:  - Offer appropriate PRN medication and supervise ingestion; conduct AIMS, as needed   Outcome: Progressing  Goal: Complete daily ADLs, including personal hygiene independently, as able  Description: Interventions:  - Observe, teach, and assist patient with ADLS  - Monitor and promote a balance of rest/activity, with adequate nutrition and elimination   Outcome: Progressing     Problem: Ineffective Coping  Goal: Identifies ineffective coping skills  Outcome: Progressing     Problem: Depression  Goal: Refrain from harming self  Description: Interventions:  - Monitor patient closely, per order   - Supervise medication ingestion, monitor effects and side effects   Outcome: Progressing     Problem: Anxiety  Goal: Anxiety is at  manageable level  Description: Interventions:  - Assess and monitor patient's anxiety level.   - Monitor for signs and symptoms (heart palpitations, chest pain, shortness of breath, headaches, nausea, feeling jumpy, restlessness, irritable, apprehensive).   - Collaborate with interdisciplinary team and initiate plan and interventions as ordered.  - [place] patient to unit/surroundings  - Explain treatment plan  - Encourage participation in care  - Encourage verbalization of concerns/fears  - Identify coping mechanisms  - Assist in developing anxiety-reducing skills  - Administer/offer alternative therapies  - Limit or eliminate stimulants  Outcome: Progressing     Problem: Alteration in Orientation  Goal: Interact with staff daily  Description: Interventions:  - Assess and re-assess patient's level of orientation  - Engage patient in 1 on 1 interactions, daily, for a minimum of 15 minutes   - Establish rapport/trust with patient   Outcome: Progressing

## 2024-10-10 NOTE — ASSESSMENT & PLAN NOTE
Reviewed 10/10/24    Continue senna-docusate sodium two tablets before bed per medical team    No associated orders from this encounter found during lookback period of 72 hours.

## 2024-10-10 NOTE — NURSING NOTE
Patient laying in bed. Writer encouraged patient to go and try some breakfast. Patient states he has no appetite. He appears depressed and preoccupied. He states that he is having auditory hallucinations and depression. Medical made aware of patient's  appetite. Medications reviewed. New orders given. Patient encouraged to increase oral intake.

## 2024-10-10 NOTE — PROGRESS NOTES
"  Progress Note - Behavioral Health   Name: Alberto Berumen 28 y.o. male I MRN: [id number]  Unit/Bed#: [facility] 108-02 I Date of Admission: 3/29/2024   Date of Service: 10/10/2024 I Hospital Day: 195     Assessment & Plan  Schizoaffective disorder, bipolar type (HCC)  Reviewed 10/10/24    Patient remains with ongoing depressive symptoms and ongoing auditory hallucinations of \"loud, angry, irritating voices.\" Remains with paranoia surrounding these voices and fears that someone is out to harm him.    Increased Clozapine to 100 mg at bedtime for psychosis  Continue to obtain CBC (weekly), BNP (every 2 weeks), CK (weekly) for ongoing clozapine monitoring    Continue Abilify 30 mg daily for psychosis  Continue escitalopram 20 mg tablet daily for depression and anxiety  Continue senna-docusate sodium two 50 mg tablets daily before bed for constipation    No associated orders from this encounter found during lookback period of 72 hours.    Chronic idiopathic constipation  Reviewed 10/10/24    Continue senna-docusate sodium two tablets before bed per medical team    No associated orders from this encounter found during lookback period of 72 hours.  GERD (gastroesophageal reflux disease)  Reviewed 10/10/24    Continue famotidine 20 mg tablet BID per medical team  Continue glycopyrrolate 1 mg tablet BID AM and afternoon per medical team  Continue glycopyrrolate 2 mg tablet before bed per medical team  Continue pantoprazole EC 20 mg tablet every morning per medical team  Medical clearance for psychiatric admission  Reviewed 10/10/24    Ongoing by medical  Tobacco abuse  Reviewed 10/10/24    Continue to encourage cessation upon discharge    Plan:  Continue prescribed psychotropic medication regimen  Currently on 201 commitment status  Continue to promote patient participation in therapeutic milieu.  Continue medical management per medicine.  Continue behavioral health checks q.7 minutes.   Continue vitals per behavioral health " "unit protocol.  Safety: Safety and communication plan established to target dynamic risk factors discussed above.   Discharge and disposition: At this time plans are being made to discharge patient to his aunt's house following clinical stabilization    SUBJECTIVE     Patient evaluated this a.m. for continuity of care. Patient was discussed at length with nursing and treatment team. Per nursing, on the inpatient psychiatric unit patient continues to make slow progress.  He continues to report ongoing struggles with psychosis.  Yesterday afternoon he reported increased depression related to auditory hallucinations derogatory in nature (stating the voices tell him that he will die, is not a good person, and he is in danger).  On evening nursing assessment he rated his depression as a 9 out of 10 and anxiety as a 3 out of 4.  He received a as needed dose of Zyprexa 5 mg at 1943 for moderate agitation, which was partially effective.  On the inpatient unit he has been medication noncompliant.  He attended 3 out of 9 groups yesterday.    Today, Alberto reports feeling \"the same.\"  Unfortunately, he continues to remain with ongoing struggles with psychosis.  He endorses ongoing derogatory auditory hallucinations that are persecutory in nature, stating \"they say they are going to kill me.\"  In this context he reports ongoing struggles with depression and anxiety.  He continues to rate his depression as a 9 out of 10 and his anxiety as a 7 out of 10.    At this time, Alberto reports that he remains with a poor appetite and states he has not been eating much at all on the inpatient unit.  He was agreeable to starting Ensure supplementation.  He reports that he slept well last night.    At the present moment, Alberto remains with passive suicidal ideation with no active plan or intent to harm himself.  He denies homicidal ideation.  He remains with ongoing derogatory auditory hallucinations.  He denies visual " hallucinations.    PSYCHIATRIC REVIEW OF SYSTEMS     Behavior over the last 24 hours:  unchanged  Sleep: slept well last night  Appetite: poor  Medication side effects: No, continues to report he has been going to the bathroom and having regular bowel movements    Progress Toward Goals: Slow progress, as evidenced by their participation in individual, social and therapeutic milieu in addition to adherence to their medication regimen.  Patient Acceptance: Fair  Patient Understanding: Limited  Patient Involvement in Reintegration Process: Patient remains in contact with his aunt  Patient's Therapeutic Relationship with Psychiatrist: Trusting  Patient's Optimism Regarding Recovery: Patient is hopeful for discharge to his aunt's house following the coordination of ACT services  Group Attendance: 3/9 groups    REVIEW OF SYSTEMS   Review of systems: no complaints    OBJECTIVE     Vital Signs in Past 24 Hours:  Temp:  [97.5 °F (36.4 °C)-98.3 °F (36.8 °C)] 98.3 °F (36.8 °C)  HR:  [68-96] 96  BP: ()/(53-69) 127/69  Resp:  [18] 18  SpO2:  [99 %-100 %] 99 %  O2 Device: None (Room air)    Intake/Output in Past 24 hours:  No intake/output data recorded.  No intake/output data recorded.        Laboratory Results:  I have personally reviewed all pertinent laboratory/tests results.  Most Recent Labs:   Lab Results   Component Value Date    WBC 10.28 (H) 10/08/2024    RBC 5.65 (H) 10/08/2024    HGB 13.6 10/08/2024    HCT 42.1 10/08/2024     10/08/2024    RDW 14.3 10/08/2024    NEUTROABS 5.38 10/08/2024    SODIUM 139 10/01/2024    K 3.7 10/01/2024     10/01/2024    CO2 28 10/01/2024    BUN 10 10/01/2024    CREATININE 0.98 10/01/2024    GLUC 111 10/01/2024    GLUF 83 09/30/2024    CALCIUM 10.3 (H) 10/01/2024    AST 26 09/30/2024    ALT 42 09/30/2024    ALKPHOS 64 09/30/2024    TP 6.6 09/30/2024    ALB 3.8 09/30/2024    TBILI 0.47 09/30/2024    CHOLESTEROL 156 03/14/2024    HDL 44 03/14/2024    TRIG 133 03/14/2024     LDLCALC 85 03/14/2024    NONHDLC 112 03/14/2024    LITHIUM 0.61 01/09/2024    [id number] 1.062 11/15/2023    HGBA1C 5.0 04/01/2024    EAG 97 04/01/2024       Behavioral Health Medications: all current active meds have been reviewed and continue current psychiatric medications.    Current Facility-Administered Medications   Medication Dose Route Frequency Provider Last Rate    acetaminophen  650 mg Oral Q4H PRN Jordan C Holter, DO      acetaminophen  650 mg Oral Q6H PRN HOLLI Lion      aluminum-magnesium hydroxide-simethicone  30 mL Oral Q4H PRN Jordan C Holter,       amLODIPine  5 mg Oral Daily HOLLI Lion      ARIPiprazole  30 mg Oral Daily Bora Rosario MD      Artificial Tears  1 drop Both Eyes Q3H PRN Jordan C Holter, DO      atropine  1 drop Sublingual HS Harjeet Torres DO      haloperidol lactate  2.5 mg Intramuscular Q4H PRN Max 4/day HOLLI iLon      And    LORazepam  1 mg Intramuscular Q4H PRN Max 4/day HOLLI Lion      And    benztropine  0.5 mg Intramuscular Q4H PRN Max 4/day HOLLI Lion      benztropine  1 mg Intramuscular Q4H PRN Max 6/day Jordan C Holter, DO      haloperidol lactate  5 mg Intramuscular Q4H PRN Max 4/day HOLLI Lion      And    LORazepam  2 mg Intramuscular Q4H PRN Max 4/day HOLLI Lion      And    benztropine  1 mg Intramuscular Q4H PRN Max 4/day HOLLI Lion      benztropine  1 mg Oral Q4H PRN Max 6/day HOLLI Lion      benztropine  1 mg Oral Q4H PRN Max 6/day Jordan C Holter, DO      bisacodyl  10 mg Rectal Daily PRN HOLLI Lion      calcium carbonate  500 mg Oral BID PRN HOLLI Monge      cloZAPine  75 mg Oral HS Hardik So MD      cyanocobalamin  1,000 mcg Oral Daily HOLLI Galvan      hydrOXYzine HCL  50 mg Oral Q6H PRN Max 4/day Jordan C Holter, DO      Or    diphenhydrAMINE  50 mg Intramuscular Q6H PRN Jordan C Holter, DO      hydrOXYzine HCL  50 mg Oral Q6H  PRN Max 4/day HOLLI Lion      Or    diphenhydrAMINE  50 mg Intramuscular Q6H PRN Eveline HOLLI Hunt      diphenhydrAMINE-zinc acetate   Topical BID PRN HOLLI Lion      escitalopram  20 mg Oral Daily HOLLI Lion      famotidine  20 mg Oral BID Eileen GonzalezHOLLI evans      fluticasone  1 spray Each Nare Daily Brina Guillen MD      glycopyrrolate  1 mg Oral BID (AM & Afternoon) Bora Rosario MD      glycopyrrolate  2 mg Oral HS Bora Rosario MD      haloperidol  1 mg Oral Q6H PRN HOLLI Lion      haloperidol  2.5 mg Oral Q4H PRN Max 4/day HOLLI Lion      haloperidol  5 mg Oral Q4H PRN Max 4/day HOLLI Lion      hydroCHLOROthiazide  12.5 mg Oral Daily HOLLI Galvan      hydrocortisone   Topical 4x Daily PRN HOLLI Lion      hydrOXYzine HCL  100 mg Oral Q6H PRN Max 4/day Ion C Holter, DO      Or    LORazepam  2 mg Intramuscular Q6H PRN Ion C Holter, DO      hydrOXYzine HCL  100 mg Oral Q6H PRN Max 4/day HOLLI Lion      Or    LORazepam  2 mg Intramuscular Q6H PRN HOLLI Lion      hydrOXYzine HCL  25 mg Oral Q6H PRN Max 4/day Ion C Holter, DO      ibuprofen  600 mg Oral Q8H PRN HOLLI Lion      influenza vaccine  0.5 mL Intramuscular Once oBra Rosario MD      loratadine  10 mg Oral Daily Brina Guillen MD      melatonin  3 mg Oral HS PRN Bora Rosario MD      melatonin  9 mg Oral HS Bora Rosario MD      metFORMIN  500 mg Oral BID With Meals HOLLI Galvan      methocarbamol  500 mg Oral Q6H PRN HOLLI Lion      nicotine polacrilex  2 mg Oral Q4H PRN Bora Rosario MD      OLANZapine  5 mg Oral Q4H PRN Max 3/day Ion C Holter, DO      Or    OLANZapine  2.5 mg Intramuscular Q4H PRN Max 3/day Ion C Holter, DO      OLANZapine  5 mg Oral Q3H PRN Max 3/day Ion C Holter, DO      Or    OLANZapine  5 mg Intramuscular Q3H PRN Max 3/day Jordan C Holter, DO      OLANZapine  2.5 mg Oral Q4H PRN Max 6/day Ion  "C Holter, DO      ondansetron  4 mg Oral Q6H PRN HOLLI Lion      pantoprazole  20 mg Oral QAM Jordan C Holter, DO      polyethylene glycol  17 g Oral Daily PRN HOLLI Ghotra      propranolol  10 mg Oral Q12H [facility] HOLLI Lion      senna-docusate sodium  1 tablet Oral Daily PRN Jordan C Holter, DO      senna-docusate sodium  2 tablet Oral HS Melvina Ambron,       traZODone  50 mg Oral HS PRN HOLLI Lion      white petrolatum-mineral oil   Topical TID PRN HOLLI Lion         Risks, benefits and possible side effects of Medications:   Risks, benefits, and possible side effects of medications explained to patient and patient verbalizes understanding.      Dual Antipsychotic Rationale: Patient is currently on 2 antipsychotics due to inability to control symptomatology on a singular antipsychotic agent and persistent psychosis that has been refractory to multiple treatment modalities    Mental Status Evaluation:  Appearance:  Age appropriate, casually dressed, overweight, fair grooming and hygiene, limited eye contact, no acute distress   Behavior:  Polite, cooperative   Speech:  Normal rate, soft, fluent, clear, coherent   Mood:  \"The same\"   Affect:  Blunted, depressed   Thought Process:  [place], linear, goal directed   Associations: [place] associations   Thought Content:  Remains with some paranoia in the context of ongoing struggles with auditory hallucinations   Perceptual Disturbances: Continues to experience auditory hallucinations that make derogatory comments \"they are going to kill me.\"  He denies visual hallucinations.  He appears internally preoccupied.   Risk Potential: Suicidal ideation - Reports ongoing passive suicidal ideation with no active plan to harm himself  Homicidal ideation - None  Potential for aggression - No   Sensorium:  oriented to person, place, and time/date   Memory:  recent and remote memory grossly intact   Consciousness:  alert and awake "   Attention/Concentration: attention span and concentration are age appropriate   Insight:  limited   Judgment: limited   Gait/Station: normal gait/station, normal balance   Motor Activity: no abnormal movements     Recommended Treatment:   See above for assessment and plan.    Inpatient Psychiatric Certification:  Patient is not at goal. They are not yet ready for discharge. The patient's condition currently requires active psychopharmacological medication management, interdisciplinary coordination with case management, and the utilization of adjunctive milieu and group therapy to augment psychopharmacological efficacy. The patient's risk of morbidity, and progression or decompensation of psychiatric disease, is higher without this current treatment. Based upon physical, mental and social evaluations, I certify that inpatient psychiatric services are medically necessary for this patient for a duration of >2 midnights for the treatment of Schizoaffective disorder, bipolar type (HCC)  Available alternative community resources do not meet the patient's mental health care needs.  I further attest that an established written individualized plan of care has been implemented and is outlined in the patient's medical records.    Counseling/Coordination of Care    Total unit time spent today was greater than 15 minutes. Greater than 50% of total time was spent with the patient and/or patient's relatives and/or coordination of patient's care.     Patient's rights, confidentiality, exceptions to confidentiality, use of electronic medical record including appropriate staff access to medical record regarding behavioral health services and consent to treatment were reviewed.    Note Share:     This note was not shared with the patient due to reasonable likelihood of causing patient harm     Hardik So MD   Psychiatry, PGY-4

## 2024-10-10 NOTE — ASSESSMENT & PLAN NOTE
"Reviewed 10/10/24    Patient remains with ongoing depressive symptoms and ongoing auditory hallucinations of \"loud, angry, irritating voices.\" Remains with paranoia surrounding these voices and fears that someone is out to harm him.    Increased Clozapine to 100 mg at bedtime for psychosis  Continue to obtain CBC (weekly), BNP (every 2 weeks), CK (weekly) for ongoing clozapine monitoring    Continue Abilify 30 mg daily for psychosis  Continue escitalopram 20 mg tablet daily for depression and anxiety  Continue senna-docusate sodium two 50 mg tablets daily before bed for constipation    No associated orders from this encounter found during lookback period of 72 hours.    "

## 2024-10-11 PROCEDURE — 99232 SBSQ HOSP IP/OBS MODERATE 35: CPT | Performed by: PSYCHIATRY & NEUROLOGY

## 2024-10-11 RX ADMIN — OLANZAPINE 5 MG: 5 TABLET, FILM COATED ORAL at 19:32

## 2024-10-11 RX ADMIN — ESCITALOPRAM OXALATE 20 MG: 10 TABLET, FILM COATED ORAL at 08:29

## 2024-10-11 RX ADMIN — PANTOPRAZOLE SODIUM 20 MG: 20 TABLET, DELAYED RELEASE ORAL at 08:29

## 2024-10-11 RX ADMIN — GLYCOPYRROLATE 1 MG: 1 TABLET ORAL at 14:09

## 2024-10-11 RX ADMIN — MULTIPLE VITAMINS W/ MINERALS TAB 1 TABLET: TAB ORAL at 08:29

## 2024-10-11 RX ADMIN — NICOTINE POLACRILEX 2 MG: 2 GUM, CHEWING ORAL at 17:13

## 2024-10-11 RX ADMIN — MELATONIN TAB 3 MG 9 MG: 3 TAB at 21:13

## 2024-10-11 RX ADMIN — ARIPIPRAZOLE 30 MG: 15 TABLET ORAL at 08:29

## 2024-10-11 RX ADMIN — PROPRANOLOL HYDROCHLORIDE 10 MG: 10 TABLET ORAL at 21:13

## 2024-10-11 RX ADMIN — PROPRANOLOL HYDROCHLORIDE 10 MG: 10 TABLET ORAL at 08:29

## 2024-10-11 RX ADMIN — LORATADINE 10 MG: 10 TABLET ORAL at 08:29

## 2024-10-11 RX ADMIN — GLYCOPYRROLATE 2 MG: 1 TABLET ORAL at 21:13

## 2024-10-11 RX ADMIN — ATROPINE SULFATE 1 DROP: 10 SOLUTION/ DROPS OPHTHALMIC at 22:13

## 2024-10-11 RX ADMIN — GLYCOPYRROLATE 1 MG: 1 TABLET ORAL at 08:29

## 2024-10-11 RX ADMIN — AMLODIPINE BESYLATE 5 MG: 5 TABLET ORAL at 08:29

## 2024-10-11 RX ADMIN — SENNOSIDES AND DOCUSATE SODIUM 2 TABLET: 8.6; 5 TABLET ORAL at 21:13

## 2024-10-11 RX ADMIN — NICOTINE POLACRILEX 2 MG: 2 GUM, CHEWING ORAL at 21:13

## 2024-10-11 RX ADMIN — NICOTINE POLACRILEX 2 MG: 2 GUM, CHEWING ORAL at 12:45

## 2024-10-11 NOTE — PLAN OF CARE
Problem: Alteration in Thoughts and Perception  Goal: Verbalize thoughts and feelings  Description: Interventions:  - Promote a nonjudgmental and trusting relationship with the patient through active listening and therapeutic communication  - Assess patient's level of functioning, behavior and potential for risk  - Engage patient in 1 on 1 interactions  - Encourage patient to express fears, feelings, frustrations, and discuss symptoms    - [place] patient to reality, help patient recognize reality-based thinking   - Administer medications as ordered and assess for potential side effects  - Provide the patient education related to the signs and symptoms of the illness and desired effects of prescribed medications  Outcome: Progressing  Goal: Refrain from acting on delusional thinking/internal stimuli  Description: Interventions:  - Monitor patient closely, per order   - Utilize least restrictive measures   - Set reasonable limits, give positive feedback for acceptable   - Administer medications as ordered and monitor of potential side effects  Outcome: Progressing  Goal: Agree to be compliant with medication regime, as prescribed and report medication side effects  Description: Interventions:  - Offer appropriate PRN medication and supervise ingestion; conduct AIMS, as needed   Outcome: Progressing  Goal: Complete daily ADLs, including personal hygiene independently, as able  Description: Interventions:  - Observe, teach, and assist patient with ADLS  - Monitor and promote a balance of rest/activity, with adequate nutrition and elimination   Outcome: Progressing     Problem: Ineffective Coping  Goal: Identifies ineffective coping skills  Outcome: Progressing     Problem: Depression  Goal: Refrain from harming self  Description: Interventions:  - Monitor patient closely, per order   - Supervise medication ingestion, monitor effects and side effects   Outcome: Progressing     Problem: Anxiety  Goal: Anxiety is at  manageable level  Description: Interventions:  - Assess and monitor patient's anxiety level.   - Monitor for signs and symptoms (heart palpitations, chest pain, shortness of breath, headaches, nausea, feeling jumpy, restlessness, irritable, apprehensive).   - Collaborate with interdisciplinary team and initiate plan and interventions as ordered.  - [place] patient to unit/surroundings  - Explain treatment plan  - Encourage participation in care  - Encourage verbalization of concerns/fears  - Identify coping mechanisms  - Assist in developing anxiety-reducing skills  - Administer/offer alternative therapies  - Limit or eliminate stimulants  Outcome: Progressing

## 2024-10-11 NOTE — PROGRESS NOTES
10/11/24 0826   Team Meeting   Meeting Type Daily Rounds   Team Members Present   Team Members Present Physician;Nurse;;Other (Discipline and Name)   Physician Team Member  Abraham   Nursing Team Member Claudia   Social Work Team Member Jose J ORTEGA   Other (Discipline and Name)  --    Patient/Family Present   Patient Present No   Patient's Family Present No     Groups Participation 4/11.   Patient's compliant with medications. Continues to be depressed. Decreased appetite. Discharge plan to family with ACT. Increased AH, PRN Zyprexa administrated. Clozaril increased to 125mg QHS.

## 2024-10-11 NOTE — SOCIAL WORK
This writer spoke with patient's sister Glory([phone]) and discussed [employer]. This writer discussed patient's MA benefits were discontinued due to his SSDI amount. This writer briefly explained the [employer] program and provided sister with the contact information to discuss further with the organization.   This writer discussed plan to discharge patient home with his Aunt with ACT services, however patient cannot receive ACT until his MA services are reinstated.

## 2024-10-11 NOTE — PROGRESS NOTES
Progress Note - Behavioral Health   Name: Alberto Berumen 28 y.o. male I MRN: [id number]  Unit/Bed#: [facility] 108-02 I Date of Admission: 3/29/2024   Date of Service: 10/11/2024 I Hospital Day: 196     Assessment & Plan  Schizoaffective disorder, bipolar type (HCC)  Reviewed on 10/11/2024    Needed as needed Zyprexa yesterday for feeling stressed with increase in voices that are threatening to kill him which he has tolerated by increasing the clozapine titrating it up to 125 mg at bedtime as of today  Counseled to increase fluid intake because of increased CK  Continue to obtain CBC (weekly), BNP (every 2 weeks), CK (weekly) for ongoing clozapine monitoring    Continue Abilify 30 mg daily for psychosis  Continue escitalopram 20 mg tablet daily for depression and anxiety  Continue senna-docusate sodium two 50 mg tablets daily before bed for constipation    No associated orders from this encounter found during lookback period of 72 hours.    Chronic idiopathic constipation  Reviewed on 10/11/2024    Continue senna-docusate sodium two tablets before bed per medical team    No associated orders from this encounter found during lookback period of 72 hours.  GERD (gastroesophageal reflux disease)  I reviewed until 1124    Continue famotidine 20 mg tablet BID per medical team  Continue glycopyrrolate 1 mg tablet BID AM and afternoon per medical team  Continue glycopyrrolate 2 mg tablet before bed per medical team  Continue pantoprazole EC 20 mg tablet every morning per medical team  Medical clearance for psychiatric admission  Reviewed on 10/11/2024    Ongoing by medical  Tobacco abuse  Reviewed on 10/11/2024    Continue to encourage cessation upon discharge  Class 2 obesity in adult  On Ensure supplements for the time being due to refusal to eat some of the meals  Primary hypertension  Reviewed on 10/11/2024 hydrochlorothiazide on hold by medical due to refusal to eat all meals  Elevated hemoglobin A1c  Reviewed on  10/11/2024  Continue metformin 500 mg tablet BID with meals per medical team      Patient Active Problem List   Diagnosis    GERD (gastroesophageal reflux disease)    Medical clearance for psychiatric admission    Schizoaffective disorder, bipolar type (HCC)    Tobacco abuse    T wave inversion in EKG    Syringoma    Chronic idiopathic constipation    Vitamin B 12 deficiency    Vitamin D deficiency    Confluent and reticulate papillomatosis    Class 2 obesity in adult    Primary hypertension    Elevated hemoglobin A1c    Bilateral lower extremity edema     Review of systems: Needed Zyprexa 5 mg po  yesterday about increasing voices and feeling stressed and clozapine increased 100 mg at bedtime which is being titrated upwards and he was not eating and Ensure supplements all cleared as he ate only 50% of plans and 100% of dinner but no breakfast yesterday and medical provider and has held his hydrochlorothiazide at this time   assessment  Overall Status: Tolerating increasing clozapine which will be titrated to 125 mg at bedtime certification Statement: The patient will continue to require additional inpatient hospital stay due to ongoing voices that are threatening to mixing lack of response to medications and ECT so far.     Medications:  propranolol 10 mg every 12 hours as as needed for anxiety, Abilify 30 mg mg at bedtime Lexapro 20 mg once a day,  and senna 2 tablets twice a day for constipation clozapine 100 mg at bedtime  All medications reviewed with patient including increase clozapine to 100 mg and titrating efforts  side effects from treatment: None reported   Medication changes   Clozapine increased to 125 mg at bedtime and titrated upwards  Medication education  Risks side effects benefits and precautions of medications discussed with patient and he did verbalize an understanding about risks for metabolic syndrome from being on neuroleptics and risk for tardive dyskinesia etc.   Understanding of  medications: Has some understanding  Justification for dual anti-psychotics: Not applicable    Non-pharmacological treatments  Continue with individual, group, milieu and occupational therapy using recovery principles and psycho-education about accepting illness and the need for treatment.   to contact aunt and explore ACT team referral which he never had  ECT to be continued  q 2 weekly for maintenance x 6  Refuses to see a dietician and agrees to do more exercises as he is about 100 lbs overweight   Prolactin level high so Risperdal replaced with Abilify which he has tolerated well so far with prolactin level coming back to normal    Safety  Safety and communication plan established to target dynamic risk factors discussed above.    Discharge Plan back to his aunt but may not be eligible for ACT team since he lost his MA benefits and [facility] to figure out alternatives in place for acting  Interval Progress   Note skipping some meals so that Ensure was added.  Tolerating the reintroduction of Clozapine at 200 mg at bedtime as she still hears threatening voices and encouraged to drink fluids because of increased CK.  Voices still threatening to kill him when he gets out but he has been hearing them for over 2 years and he continues to insist that they are real.  Staying to himself and still with some passive death wishes depression or the increase in voices but able to contract for safety.  No behavioral PRNs needed lately.No inappropriate boundary intrusions reported between female peer and patient and staff  is keeping a close watch on both.  Was skipping some meals and Ensure added.  Skipped breakfast and had only 50% less than 100% interested and reminded to eat and except Ensure.  Not aggressive or agitated or threatening and self-abusive but needed Zyprexa 5 mg p.o. yesterday afternoon for agitation secondary to increasing voices  acceptance by patient: Accepting  Hopefulness in  recovery: Living with his aunt  Involved in reintegration process: Talking with his aunt from [facility] and his sisters from [place] who also visit with him periodically  trusting in relationship with psychiatrist: Appears to trust  Sleep: Good  Appetite: Poor skipping some meals placed on Ensure supplements  Compliance with Medications: Good  Group attendance: Some of the groups 4/11  Significant events and progress towards goals: Needed Zyprexa as needed yesterday for increasing voices tolerating reintroduction of Clozapine and still with same threatening voices which upsets him and not eating all meals with Ensure supplements    Mental Status Exam  Appearance: age appropriate, improved grooming, looks older than stated age, overweight, with hair in dreadlocks casually dressed with a clean shaven face, fairly groomed with good eye contact laying on bed dressed in hospital gown easily aroused but sleeping in the morning behavior: Friendly cooperative with good eye contact  speech: normal rate, normal volume, normal pitch  Mood: dysphoric, anxious, continues to report feeling good with the ECT continues to report feeling depressed and sad   affect: constricted, inappropriate, mood-congruent.  Constricted with minimal mood reactivity thought Process: organized, logical, coherent, goal directed, linear, decreased rate of thoughts, slowing of thoughts, negative thinking, impaired abstract reasoning, concrete  Thought Content: paranoid ideation, some paranoia, grandiose ideas, intrusive thoughts, preoccupied, chronic, continues to report paranoia about people threatening to kill him and his family because of the voices.  He believes ECT has helped so that he is not much in his head.  No other delusions elicited.  No current suicidal or homicidal thoughts and no plans verbalized but today reports having passive death wishes because of voices with no plans and able to CFS and it has not changed yet  .  No phobias  obsessions compulsions or distorted body perceptions reported.   Perceptual Disturbances: Reports same threatening voices   Hx Risk Factors: chronic psychiatric problems, chronic anxiety symptoms, chronic psychotic symptoms,    Sensorium: Oriented x 3 spheres and situation  cognition: recent and remote memory grossly intact  Consciousness: alert and awake  Attention: Attention and concentration span good  Intellect: appears to be of average intelligence  Insight: intact  Judgement: intact  Motor Activity: no abnormal movements     Vitals  Temp:  [97.5 °F (36.4 °C)-98.3 °F (36.8 °C)] 97.5 °F (36.4 °C)  HR:  [77-96] 83  Resp:  [18] 18  BP: (117-128)/(59-80) 128/80  SpO2:  [99 %] 99 %  No intake or output data in the 24 hours ending 10/11/24 0427      Lab Results: All labs reviewed and prolactin level came down to normal on 8/1/2024, clozapine level 820 on 9/5/24  Current Facility-Administered Medications   Medication Dose Route Frequency Provider Last Rate    acetaminophen  650 mg Oral Q4H PRN Jordan C Holter, DO      acetaminophen  650 mg Oral Q6H PRN HOLLI Lion      aluminum-magnesium hydroxide-simethicone  30 mL Oral Q4H PRN Jordan C Holter, DO      amLODIPine  5 mg Oral Daily HOLLI Lion      ARIPiprazole  30 mg Oral Daily Bora Rosario MD      Artificial Tears  1 drop Both Eyes Q3H PRN Jordan C Holter, DO      atropine  1 drop Sublingual HS Harjeet Torres, DO      haloperidol lactate  2.5 mg Intramuscular Q4H PRN Max 4/day HOLLI Lion      And    LORazepam  1 mg Intramuscular Q4H PRN Max 4/day HOLLI Lion      And    benztropine  0.5 mg Intramuscular Q4H PRN Max 4/day HOLLI Lion      benztropine  1 mg Intramuscular Q4H PRN Max 6/day Jordan C Holter,       haloperidol lactate  5 mg Intramuscular Q4H PRN Max 4/day HOLLI Lion      And    LORazepam  2 mg Intramuscular Q4H PRN Max 4/day HOLLI Lion      And    benztropine  1 mg Intramuscular Q4H PRN Max 4/day  HOLLI Lion      benztropine  1 mg Oral Q4H PRN Max 6/day HOLLI Lion      benztropine  1 mg Oral Q4H PRN Max 6/day Ion FISCHER Holter, DO      bisacodyl  10 mg Rectal Daily PRN HOLLI Lion      calcium carbonate  500 mg Oral BID PRN HOLLI Monge      cloZAPine  125 mg Oral HS Bora Rosario MD      hydrOXYzine HCL  50 mg Oral Q6H PRN Max 4/day Ion FISCHER Holter, DO      Or    diphenhydrAMINE  50 mg Intramuscular Q6H PRN Ion FISCHER Holter, DO      hydrOXYzine HCL  50 mg Oral Q6H PRN Max 4/day HOLLI Lion      Or    diphenhydrAMINE  50 mg Intramuscular Q6H PRN HOLLI Lion      diphenhydrAMINE-zinc acetate   Topical BID PRN HOLLI Lion      escitalopram  20 mg Oral Daily HOLLI Lion      famotidine  20 mg Oral BID PRN HOLLI Monge      fluticasone  1 spray Each Nare Daily PRN HOLLI Monge      glycopyrrolate  1 mg Oral BID (AM & Afternoon) Bora Rosario MD      glycopyrrolate  2 mg Oral HS Bora Rosario MD      haloperidol  1 mg Oral Q6H PRN HOLLI Lion      haloperidol  2.5 mg Oral Q4H PRN Max 4/day HOLLI Lion      haloperidol  5 mg Oral Q4H PRN Max 4/day HOLLI Lion      [Held by provider] hydroCHLOROthiazide  12.5 mg Oral Daily HOLLI Galvan      hydrocortisone   Topical 4x Daily PRN HOLLI Lion      hydrOXYzine HCL  100 mg Oral Q6H PRN Max 4/day Ion FISCHER Holter, DO      Or    LORazepam  2 mg Intramuscular Q6H PRN Ion FISCHER Holter, DO      hydrOXYzine HCL  100 mg Oral Q6H PRN Max 4/day HOLLI Lion      Or    LORazepam  2 mg Intramuscular Q6H PRN HOLLI Lion      hydrOXYzine HCL  25 mg Oral Q6H PRN Max 4/day Ion FISCHER Holter, DO      ibuprofen  600 mg Oral Q8H PRN Eveline Hangey, CRNP      influenza vaccine  0.5 mL Intramuscular Once Bora Rosario MD      loratadine  10 mg Oral Daily Brina Guillen MD      melatonin  3 mg Oral HS PRN Bora Rosario MD      melatonin  9 mg Oral  HS Bora Rosario MD      methocarbamol  500 mg Oral Q6H PRN HOLLI Lion      multivitamin-minerals  1 tablet Oral Daily Eileen GonzalezmiryamjaylenHOLLI      nicotine polacrilex  2 mg Oral Q4H PRN Bora Rosario MD      OLANZapine  5 mg Oral Q4H PRN Max 3/day [facility] [address] Holter, DO      Or    OLANZapine  2.5 mg Intramuscular Q4H PRN Max 3/day [facility] [address] Holter, DO      OLANZapine  5 mg Oral Q3H PRN Max 3/day [facility] [address] Holter, DO      Or    OLANZapine  5 mg Intramuscular Q3H PRN Max 3/day [facility] [address] Holter, DO      OLANZapine  2.5 mg Oral Q4H PRN Max 6/day [facility] [address] Holter, DO      ondansetron  4 mg Oral Q6H PRN HOLLI Lion      pantoprazole  20 mg Oral QAM [facility] [address] Holter, DO      polyethylene glycol  17 g Oral Daily PRN HOLLI Ghotra      propranolol  10 mg Oral Q12H KAYLIE HOLLI Lion      senna-docusate sodium  1 tablet Oral Daily PRN [facility] [address] Holter, DO      senna-docusate sodium  2 tablet Oral HS Melvina Ambron, DO      traZODone  50 mg Oral HS PRN HOLLI Lion      white petrolatum-mineral oil   Topical TID PRN HOLLI Lion         Counseling / Coordination of Care: Total floor / unit time spent today 15 minutes. Greater than 50% of total time was spent with the patient and / or family counseling and / or somewhat receptive to supportive listening and teaching positive coping skills to deal with symptom mangement.     Patient's Rights, confidentiality and exceptions to confidentiality, use of automated medical record, Behavioral Health Services staff access to medical record, and consent to treatment reviewed.    This note has been dictated and hence there may be problems with punctuation, spelling and formatting and if anyone has any concerns please address them to Dr. Rosario   This note is not shared with patient due to potential for making patient's condition worse by knowing the content of the note.

## 2024-10-11 NOTE — NURSING NOTE
"Patient has been quiet and withdrawn. He states \"I still hear voices.\" Patient has minimal eye contact. Poor appetite. He did not attend groups. He spent most of the day in his room. Patient needs encouragement to eat and drink fluids.   "

## 2024-10-12 PROCEDURE — 99232 SBSQ HOSP IP/OBS MODERATE 35: CPT | Performed by: PSYCHIATRY & NEUROLOGY

## 2024-10-12 RX ADMIN — ARIPIPRAZOLE 30 MG: 15 TABLET ORAL at 09:03

## 2024-10-12 RX ADMIN — PANTOPRAZOLE SODIUM 20 MG: 20 TABLET, DELAYED RELEASE ORAL at 09:03

## 2024-10-12 RX ADMIN — HYDROCHLOROTHIAZIDE 12.5 MG: 12.5 TABLET ORAL at 09:03

## 2024-10-12 RX ADMIN — PROPRANOLOL HYDROCHLORIDE 10 MG: 10 TABLET ORAL at 09:03

## 2024-10-12 RX ADMIN — GLYCOPYRROLATE 2 MG: 1 TABLET ORAL at 21:09

## 2024-10-12 RX ADMIN — SENNOSIDES AND DOCUSATE SODIUM 2 TABLET: 8.6; 5 TABLET ORAL at 21:09

## 2024-10-12 RX ADMIN — MULTIPLE VITAMINS W/ MINERALS TAB 1 TABLET: TAB ORAL at 09:02

## 2024-10-12 RX ADMIN — PROPRANOLOL HYDROCHLORIDE 10 MG: 10 TABLET ORAL at 21:11

## 2024-10-12 RX ADMIN — GLYCOPYRROLATE 1 MG: 1 TABLET ORAL at 09:02

## 2024-10-12 RX ADMIN — ATROPINE SULFATE 1 DROP: 10 SOLUTION/ DROPS OPHTHALMIC at 21:13

## 2024-10-12 RX ADMIN — NICOTINE POLACRILEX 2 MG: 2 GUM, CHEWING ORAL at 17:11

## 2024-10-12 RX ADMIN — CLOZAPINE 150 MG: 25 TABLET ORAL at 21:09

## 2024-10-12 RX ADMIN — NICOTINE POLACRILEX 2 MG: 2 GUM, CHEWING ORAL at 12:15

## 2024-10-12 RX ADMIN — LORATADINE 10 MG: 10 TABLET ORAL at 09:03

## 2024-10-12 RX ADMIN — NICOTINE POLACRILEX 2 MG: 2 GUM, CHEWING ORAL at 21:11

## 2024-10-12 RX ADMIN — AMLODIPINE BESYLATE 5 MG: 5 TABLET ORAL at 09:03

## 2024-10-12 RX ADMIN — ESCITALOPRAM OXALATE 20 MG: 10 TABLET, FILM COATED ORAL at 09:02

## 2024-10-12 RX ADMIN — MELATONIN TAB 3 MG 9 MG: 3 TAB at 21:09

## 2024-10-12 RX ADMIN — GLYCOPYRROLATE 1 MG: 1 TABLET ORAL at 14:39

## 2024-10-12 NOTE — NURSING NOTE
Pt is calm and cooperative, visible on the unit. Pt ate 75% of dinner. Pt is sad and depressed, say he is thinking a lot. Pt is medication compliant, safety checks ongoing.

## 2024-10-12 NOTE — PROGRESS NOTES
Progress Note - Behavioral Health   Name: Alberto Berumen 28 y.o. male I MRN: [id number]  Unit/Bed#: [facility] 108-02 I Date of Admission: 3/29/2024   Date of Service: 10/12/2024 I Hospital Day: 197     Assessment & Plan  Schizoaffective disorder, bipolar type (HCC)  Reviewed on 10/12/2020    Still with same threatening voices that makes him depressed but happy that he is now back on clozapine but somewhat apprehensive about being on it and last CK was 430 on 10/3/24 Ate all 3 meals yesterday    Increased Clozapine to 125 mg at bedtime for psychosis  Continue to obtain CBC (weekly), BNP (every 2 weeks), CK (weekly) for ongoing clozapine monitoring    Continue Abilify 30 mg daily for psychosis  Continue escitalopram 20 mg tablet daily for depression and anxiety  Continue senna-docusate sodium two 50 mg tablets daily before bed for constipation    No associated orders from this encounter found during lookback period of 72 hours.    Chronic idiopathic constipation  Reviewed on 10/12/2024    Continue senna-docusate sodium two tablets before bed per medical team    No associated orders from this encounter found during lookback period of 72 hours.  GERD (gastroesophageal reflux disease)  Reviewed on 10/12/2024    Continue famotidine 20 mg tablet BID per medical team    Continue pantoprazole EC 20 mg tablet every morning per medical team  Medical clearance for psychiatric admission  Reviewed on 10/12/2024    Ongoing by medical  Tobacco abuse  Reviewed on [place] 24    Continue to encourage cessation upon discharge  Class 2 obesity in adult  Reviewed on 10/12/2024 with ongoing counseling about watching diet  Primary hypertension  Reviewed on 10/12/2024  Elevated hemoglobin A1c  Reviewed on 10/12/2024   continue metformin 500 mg tablet BID with meals per medical team        Patient Active Problem List   Diagnosis    GERD (gastroesophageal reflux disease)    Medical clearance for psychiatric admission    Schizoaffective  disorder, bipolar type (HCC)    Tobacco abuse    T wave inversion in EKG    Syringoma    Chronic idiopathic constipation    Vitamin B 12 deficiency    Vitamin D deficiency    Confluent and reticulate papillomatosis    Class 2 obesity in adult    Primary hypertension    Elevated hemoglobin A1c    Bilateral lower extremity edema     Review of systems: No eating all 3 meals a day but still with same threatening voices that makes him feel sad and depressed but not to the point of suicide and still apprehensive about clozapine titration as it caused his joints to wake but he has tolerated it well so far and he is encouraged to drink fluids because of increasing CK last week   assessment  Overall Status: Status quo with same threatening voices and close (to be increased as 150 mg at bedtime, eating all 3 meals and needed Zyprexa 5 mg around 7:30 PM last night for increasing voices   certification Statement: The patient will continue to require additional inpatient hospital stay due to ongoing voices that are threatening to mixing lack of response to medications and ECT so far.     Medications:  propranolol 10 mg every 12 hours as as needed for anxiety, Abilify 30 mg mg at bedtime Lexapro 20 mg once a day,  and senna 2 tablets twice a day for constipation clozapine 100 mg at bedtime  All medications reviewed and clozapine increased as 150 at bedtime   side effects from treatment: None reported   Medication changes   Clozapine increased to 150 mg at bedtime and titrated upwards  Medication education  Risks side effects benefits and precautions of medications discussed with patient and he did verbalize an understanding about risks for metabolic syndrome from being on neuroleptics and risk for tardive dyskinesia etc.   Understanding of medications: Has some understanding  Justification for dual anti-psychotics: Not applicable    Non-pharmacological treatments  Continue with individual, group, milieu and occupational therapy  using recovery principles and psycho-education about accepting illness and the need for treatment.   to contact aunt and explore ACT team referral which he never had  ECT to be continued  q 2 weekly for maintenance x 6  Refuses to see a dietician and agrees to do more exercises as he is about 100 lbs overweight   Prolactin level high so Risperdal replaced with Abilify which he has tolerated well so far with prolactin level coming back to normal    Safety  Safety and communication plan established to target dynamic risk factors discussed above.    Discharge Plan back to his aunt but may not be eligible for ACT team since he lost his MA benefits and [facility] to figure out alternatives in place for acting and family is trying to put him in the PA able program to see if he could get the ACT services back to be eligible  Interval Progress   Accepting all 3 meals 100% of the time, tolerating reintroduction of Clozapine as 125 mg at bedtime which is being increased as 150 mg at bedtime today.  Still hearing same threatening voices and understands the need to drink enough fluids to decrease the CK that was increased last week.  Voices are still threatening to kill him and he gets out but he understands he has been hearing them for more than 2 years but he continues to insist they are still earlier.  Staying to himself with verbalization of passive death wishes and some sadness but able to contract for safety.  No behavioral PRNs needed.  No appropriate boundary violations reported between a female peer he hangs out with and staff is keeping a close watch between the 2.  Not aggressive or agitated or threatening or self-abusive with no need for behavioral PRNs yesterday except for Zyprexa 5 mg around 7:30 PM for increasing voices  acceptance by patient: Accepting  Hopefulness in recovery: Living with his aunt  Involved in reintegration process: Talking with his aunt from the [facility] and his sister is  from [place] who also visit with him periodically   trusting in relationship with psychiatrist: Appears to trust  Sleep: Good  Appetite: Improving now eating all 3 meals  Compliance with Medications: Good  Group attendance: Attending some  Significant events and progress towards goals: No need for behavioral PRNs yesterday and has tolerated increasing clozapine so for with same threatening voices lately.     Mental Status Exam  Appearance: age appropriate, improved grooming, looks older than stated age, overweight, with hair in dreadlocks casually dressed with a clean shaven face, fairly groomed with good eye contact again found laying on but easily aroused wearing hospital clothing casually dressed fairly groomed with no good eye contact   behavior: Friendly cooperative with no good eye contact   speech: normal rate, normal volume, normal pitch  Mood: dysphoric, anxious, continues to report feeling good with the ECT continues to report feeling depressed and sad   affect: constricted, inappropriate, mood-congruent.  Constricted with minimal mood reactivity   thought Process: organized, logical, coherent, goal directed, linear, decreased rate of thoughts, slowing of thoughts, negative thinking, impaired abstract reasoning, concrete  Thought Content: paranoid ideation, some paranoia, grandiose ideas, intrusive thoughts, preoccupied, chronic, continues to report paranoia about people threatening to kill him and his family because of the voices.  He believes ECT has helped so that he is not much in his head.  No other delusions elicited.  No current suicidal or homicidal thoughts and no plans verbalized but today reports having passive death wishes because of voices with no plans and able to CFS and it has not changed yet  .  No phobias obsessions compulsions or distorted body perceptions reported.   Perceptual Disturbances: Continues to report voices that they are threatening to kill him and his family but does not  appear responding  Hx Risk Factors: chronic psychiatric problems, chronic anxiety symptoms, chronic psychotic symptoms,    Sensorium: Oriented x 3 spheres and situation  cognition: recent and remote memory grossly intact  Consciousness: alert and awake  Attention: Attention and concentration span good  Intellect: appears to be of average intelligence  Insight: intact  Judgement: intact  Motor Activity: no abnormal movements     Vitals  Temp:  [97.6 °F (36.4 °C)-98 °F (36.7 °C)] 97.6 °F (36.4 °C)  HR:  [70-91] 85  Resp:  [18] 18  BP: (105-120)/(55-77) 110/70  SpO2:  [98 %-100 %] 98 %  No intake or output data in the 24 hours ending 10/12/24 0921      Lab Results: All labs reviewed and prolactin level came down to normal on 8/1/2024, clozapine level 820 on 9/5/24  Current Facility-Administered Medications   Medication Dose Route Frequency Provider Last Rate    acetaminophen  650 mg Oral Q4H PRN Jordan C Holter, DO      acetaminophen  650 mg Oral Q6H PRN HOLLI Lion      aluminum-magnesium hydroxide-simethicone  30 mL Oral Q4H PRN Jordan C Holter, DO      amLODIPine  5 mg Oral Daily HOLLI Lion      ARIPiprazole  30 mg Oral Daily Bora Rosario MD      Artificial Tears  1 drop Both Eyes Q3H PRN Jordan C Holter, DO      atropine  1 drop Sublingual  Harjeet Torres, DO      haloperidol lactate  2.5 mg Intramuscular Q4H PRN Max 4/day HOLLI Lion      And    LORazepam  1 mg Intramuscular Q4H PRN Max 4/day HOLLI Lion      And    benztropine  0.5 mg Intramuscular Q4H PRN Max 4/day HOLLI Lion      benztropine  1 mg Intramuscular Q4H PRN Max 6/day Jordan C Holter, DO      haloperidol lactate  5 mg Intramuscular Q4H PRN Max 4/day HOLLI Lion      And    LORazepam  2 mg Intramuscular Q4H PRN Max 4/day HOLLI Lion      And    benztropine  1 mg Intramuscular Q4H PRN Max 4/day HOLLI Lion      benztropine  1 mg Oral Q4H PRN Max 6/day HOLLI Lion      benztropine  1  mg Oral Q4H PRN Max 6/day [facility] Holter, DO      bisacodyl  10 mg Rectal Daily PRN OHLLI Lion      calcium carbonate  500 mg Oral BID PRN HOLLI Monge      cloZAPine  125 mg Oral HS Bora Rosario MD      hydrOXYzine HCL  50 mg Oral Q6H PRN Max 4/day [facility] Holter, DO      Or    diphenhydrAMINE  50 mg Intramuscular Q6H PRN [facility] Holter, DO      hydrOXYzine HCL  50 mg Oral Q6H PRN Max 4/day HOLLI Lion      Or    diphenhydrAMINE  50 mg Intramuscular Q6H PRN HOLLI Lion      diphenhydrAMINE-zinc acetate   Topical BID PRN HOLLI Lion      escitalopram  20 mg Oral Daily HOLLI Lion      famotidine  20 mg Oral BID PRN HOLLI Monge      fluticasone  1 spray Each Nare Daily PRN HOLLI Monge      glycopyrrolate  1 mg Oral BID (AM & Afternoon) Bora Rosario MD      glycopyrrolate  2 mg Oral HS Bora Rosario MD      haloperidol  1 mg Oral Q6H PRN HOLLI Lion      haloperidol  2.5 mg Oral Q4H PRN Max 4/day HOLLI Lion      haloperidol  5 mg Oral Q4H PRN Max 4/day HOLLI Lion      hydroCHLOROthiazide  12.5 mg Oral Daily HOLLI Galvan      hydrocortisone   Topical 4x Daily PRN HOLLI Lion      hydrOXYzine HCL  100 mg Oral Q6H PRN Max 4/day [facility] Holter, DO      Or    LORazepam  2 mg Intramuscular Q6H PRN [facility] Holter, DO      hydrOXYzine HCL  100 mg Oral Q6H PRN Max 4/day HOLLI Lion      Or    LORazepam  2 mg Intramuscular Q6H PRN HOLLI Lion      hydrOXYzine HCL  25 mg Oral Q6H PRN Max 4/day [facility] Holter, DO      ibuprofen  600 mg Oral Q8H PRN HOLLI Lion      influenza vaccine  0.5 mL Intramuscular Once Bora Rosario MD      loratadine  10 mg Oral Daily Brina Guillen MD      melatonin  3 mg Oral HS PRN Bora Rosario MD      melatonin  9 mg Oral HS Bora Rosario MD      methocarbamol  500 mg Oral Q6H PRN HOLLI Lion      multivitamin-minerals  1 tablet Oral  Daily HOLLI Monge      nicotine polacrilex  2 mg Oral Q4H PRN Bora Rosario MD      OLANZapine  5 mg Oral Q4H PRN Max 3/day [facility] Holter, DO      Or    OLANZapine  2.5 mg Intramuscular Q4H PRN Max 3/day [facility] Holter, DO      OLANZapine  5 mg Oral Q3H PRN Max 3/day [facility] Holter, DO      Or    OLANZapine  5 mg Intramuscular Q3H PRN Max 3/day [facility] Holter, DO      OLANZapine  2.5 mg Oral Q4H PRN Max 6/day [facility] Holter, DO      ondansetron  4 mg Oral Q6H PRN HOLLI Lion      pantoprazole  20 mg Oral QAM [facility] Holter, DO      polyethylene glycol  17 g Oral Daily PRN HOLLI Ghotra      propranolol  10 mg Oral Q12H KAYLIE HOLLI Lion      senna-docusate sodium  1 tablet Oral Daily PRN [facility] Holter, DO      senna-docusate sodium  2 tablet Oral HS Melvina Ambron, DO      traZODone  50 mg Oral HS PRN HOLLI Lion      white petrolatum-mineral oil   Topical TID PRN HOLLI Lion         Counseling / Coordination of Care: Total floor / unit time spent today 15 minutes. Greater than 50% of total time was spent with the patient and / or family counseling and / or somewhat receptive to supportive listening and teaching positive coping skills to deal with symptom mangement.     Patient's Rights, confidentiality and exceptions to confidentiality, use of automated medical record, Behavioral Health Services staff access to medical record, and consent to treatment reviewed.    This note has been dictated and hence there may be problems with punctuation, spelling and formatting and if anyone has any concerns please address them to Dr. Rosario   This note is not shared with patient due to potential for making patient's condition worse by knowing the content of the note.

## 2024-10-12 NOTE — NURSING NOTE
Patient reported feeling agitated due to the voices.  Patient received Zyprexa 5 mg po prn at this time for moderate anxiety.  Patient holding his head between his legs at this time.

## 2024-10-12 NOTE — NURSING NOTE
"Zyprexa 5 mg, given an hour ago was effective. No further complaints of 'voices\".  Patient on the phone with a better affect discussing his birthday plans. Appears to be brighter once he got the news his family is allowed to bring him food in on his birthday. (As per [facility]).  "

## 2024-10-12 NOTE — NURSING NOTE
Patient in bed at this time and appears to be resting comfortably. Eyes closed and chest movements noted. Slept all night through. Uneventful night

## 2024-10-12 NOTE — PLAN OF CARE
Problem: Alteration in Thoughts and Perception  Goal: Agree to be compliant with medication regime, as prescribed and report medication side effects  Description: Interventions:  - Offer appropriate PRN medication and supervise ingestion; conduct AIMS, as needed   Outcome: Progressing     Problem: Ineffective Coping  Goal: Participates in unit activities  Description: Interventions:  - Provide therapeutic environment   - Provide required programming   - Redirect inappropriate behaviors   Outcome: Progressing     Problem: Depression  Goal: Refrain from harming self  Description: Interventions:  - Monitor patient closely, per order   - Supervise medication ingestion, monitor effects and side effects   Outcome: Progressing  Goal: Refrain from isolation  Description: Interventions:  - Develop a trusting relationship   - Encourage socialization   Outcome: Progressing  Goal: Refrain from self-neglect  Outcome: Progressing     Problem: Depression  Goal: Treatment Goal: Demonstrate behavioral control of depressive symptoms, verbalize feelings of improved mood/affect, and adopt new coping skills prior to discharge  Outcome: Not Progressing

## 2024-10-13 VITALS
WEIGHT: 239.4 LBS | DIASTOLIC BLOOD PRESSURE: 80 MMHG | TEMPERATURE: 98.1 F | HEIGHT: 66 IN | SYSTOLIC BLOOD PRESSURE: 122 MMHG | RESPIRATION RATE: 18 BRPM | HEART RATE: 94 BPM | BODY MASS INDEX: 38.47 KG/M2 | OXYGEN SATURATION: 99 %

## 2024-10-13 PROCEDURE — 99232 SBSQ HOSP IP/OBS MODERATE 35: CPT | Performed by: PSYCHIATRY & NEUROLOGY

## 2024-10-13 RX ADMIN — LORATADINE 10 MG: 10 TABLET ORAL at 08:44

## 2024-10-13 RX ADMIN — SENNOSIDES AND DOCUSATE SODIUM 2 TABLET: 8.6; 5 TABLET ORAL at 21:16

## 2024-10-13 RX ADMIN — PROPRANOLOL HYDROCHLORIDE 10 MG: 10 TABLET ORAL at 21:16

## 2024-10-13 RX ADMIN — PANTOPRAZOLE SODIUM 20 MG: 20 TABLET, DELAYED RELEASE ORAL at 08:44

## 2024-10-13 RX ADMIN — MULTIPLE VITAMINS W/ MINERALS TAB 1 TABLET: TAB ORAL at 08:43

## 2024-10-13 RX ADMIN — GLYCOPYRROLATE 1 MG: 1 TABLET ORAL at 08:44

## 2024-10-13 RX ADMIN — MELATONIN TAB 3 MG 9 MG: 3 TAB at 21:16

## 2024-10-13 RX ADMIN — ARIPIPRAZOLE 30 MG: 15 TABLET ORAL at 08:43

## 2024-10-13 RX ADMIN — NICOTINE POLACRILEX 2 MG: 2 GUM, CHEWING ORAL at 17:13

## 2024-10-13 RX ADMIN — ATROPINE SULFATE 1 DROP: 10 SOLUTION/ DROPS OPHTHALMIC at 21:23

## 2024-10-13 RX ADMIN — ESCITALOPRAM OXALATE 20 MG: 10 TABLET, FILM COATED ORAL at 08:43

## 2024-10-13 RX ADMIN — PROPRANOLOL HYDROCHLORIDE 10 MG: 10 TABLET ORAL at 08:43

## 2024-10-13 RX ADMIN — HYDROCHLOROTHIAZIDE 12.5 MG: 12.5 TABLET ORAL at 08:44

## 2024-10-13 RX ADMIN — GLYCOPYRROLATE 2 MG: 1 TABLET ORAL at 21:16

## 2024-10-13 RX ADMIN — NICOTINE POLACRILEX 2 MG: 2 GUM, CHEWING ORAL at 21:15

## 2024-10-13 RX ADMIN — GLYCOPYRROLATE 1 MG: 1 TABLET ORAL at 14:15

## 2024-10-13 RX ADMIN — CLOZAPINE 175 MG: 100 TABLET ORAL at 21:15

## 2024-10-13 NOTE — QUICK NOTE
Notified by nursing this afternoon that patient had a 3.4 pound weight loss in 1 week.  Given the fact that patient's BMI is 38.64.  I am not concerned about this weight loss.

## 2024-10-13 NOTE — NURSING NOTE
Pt is pleasant, calm, and cooperative. Pt is social with peers and staff. Pt refused breakfast ate 75% for lunch. Pt reports feeling depressed, reports AH voices saying they are going to kill maurizio when he gets out the hospital. Pt denies SI/HI/AVH. Pt is medication complaint, safety checks ongoing.

## 2024-10-13 NOTE — NURSING NOTE
Patient isolative to room and self most of the evening.  Patient continues claudio appear depressed.  Quiet and soft spoken when speaking to the writer. Behaviors controlled. Did not feel like having a conversation. No prn medication requested or required..

## 2024-10-13 NOTE — NURSING NOTE
Pt is calm and cooperative, visible on the unit, social with peers and staff. Pt ate 75% of dinner. Safety checks ongoing.

## 2024-10-13 NOTE — PLAN OF CARE
Problem: Alteration in Thoughts and Perception  Goal: Verbalize thoughts and feelings  Description: Interventions:  - Promote a nonjudgmental and trusting relationship with the patient through active listening and therapeutic communication  - Assess patient's level of functioning, behavior and potential for risk  - Engage patient in 1 on 1 interactions  - Encourage patient to express fears, feelings, frustrations, and discuss symptoms    - [place] patient to reality, help patient recognize reality-based thinking   - Administer medications as ordered and assess for potential side effects  - Provide the patient education related to the signs and symptoms of the illness and desired effects of prescribed medications  Outcome: Progressing  Goal: Agree to be compliant with medication regime, as prescribed and report medication side effects  Description: Interventions:  - Offer appropriate PRN medication and supervise ingestion; conduct AIMS, as needed   Outcome: Progressing     Problem: Alteration in Orientation  Goal: Interact with staff daily  Description: Interventions:  - Assess and re-assess patient's level of orientation  - Engage patient in 1 on 1 interactions, daily, for a minimum of 15 minutes   - Establish rapport/trust with patient   Outcome: Progressing  Goal: Allow medical examinations, as recommended  Description: Interventions:  - Provide physical/neurological exams and/or referrals, per provider   Outcome: Progressing  Goal: Cooperate with recommended testing/procedures  Description: Interventions:  - Determine need for ancillary testing  - Observe for mental status changes  - Implement falls/precaution protocol   Outcome: Progressing     Problem: Alteration in Thoughts and Perception  Goal: Treatment Goal: Gain control of psychotic behaviors/thinking, reduce/eliminate presenting symptoms and demonstrate improved reality functioning upon discharge  Outcome: Not Progressing  Goal: Attend and participate in  unit activities, including therapeutic, recreational, and educational groups  Description: Interventions:  -Encourage Visitation and family involvement in care  Outcome: Not Progressing

## 2024-10-13 NOTE — PROGRESS NOTES
Progress Note - Behavioral Health   Name: Alberto Berumen 28 y.o. male I MRN: [id number]  Unit/Bed#: [facility] 108-02 I Date of Admission: 3/29/2024   Date of Service: 10/13/2024 I Hospital Day: 198     Assessment & Plan  Schizoaffective disorder, bipolar type (HCC)  Reviewed on 10/13/2024 again skipping some meals but accepting Ensure  Still feels depressed but not to the point of suicide and continues to have same threatening voices  Clozapine 150 mg at bedtime  Continue to obtain CBC (weekly), BNP (every 2 weeks), CK (weekly) for ongoing clozapine monitoring    Continue Abilify 30 mg daily for psychosis  Continue escitalopram 20 mg tablet daily for depression and anxiety  Continue senna-docusate sodium two 50 mg tablets daily before bed for constipation    No associated orders from this encounter found during lookback period of 72 hours.    Chronic idiopathic constipation  Reviewed on 10/13/2024    Continue senna-docusate sodium two tablets before bed per medical team    No associated orders from this encounter found during lookback period of 72 hours.  GERD (gastroesophageal reflux disease)  Were reviewed on 10/13/2024    Continue famotidine 20 mg tablet BID per medical team  Continue glycopyrrolate 1 mg tablet BID AM and afternoon per medical team  Continue glycopyrrolate 2 mg tablet before bed per medical team  Continue pantoprazole EC 20 mg tablet every morning per medical team  Medical clearance for psychiatric admission  Reviewed on 10/13/2024    Ongoing by medical  Tobacco abuse  Reviewed on 10/13/2024    Continue to encourage cessation upon discharge  Class 2 obesity in adult  Reviewed on 10/13/2024 with dietary counseling  Primary hypertension  Reviewed on 10/13/2024 followed up by medical  Elevated hemoglobin A1c  Reviewed on 10/13/2024 followed up by medical  Continue metformin 500 mg tablet BID with meals per medical team        Patient Active Problem List   Diagnosis    GERD (gastroesophageal reflux  disease)    Medical clearance for psychiatric admission    Schizoaffective disorder, bipolar type (HCC)    Tobacco abuse    T wave inversion in EKG    Syringoma    Chronic idiopathic constipation    Vitamin B 12 deficiency    Vitamin D deficiency    Confluent and reticulate papillomatosis    Class 2 obesity in adult    Primary hypertension    Elevated hemoglobin A1c    Bilateral lower extremity edema     Review of systems: Refused breakfast and lunch and had 75% of dinner and no breakfast this morning but had Ensure yesterday at lunchtime and dinnertime with otherwise unremarkable   assessment  Overall Status: With same threatening voices as before and tolerating increasing clozapine   certification Statement: The patient will continue to require additional inpatient hospital stay due to ongoing voices that are threatening to mixing lack of response to medications and ECT until eligible for an ACT team to live with his aunt.     Medications:  propranolol 10 mg every 12 hours as as needed for anxiety, Abilify 30 mg mg at bedtime Lexapro 20 mg once a day,  and senna 2 tablets twice a day for constipation clozapine 100 mg at bedtime  All medications reviewed and clozapine increased as 150 at bedtime   side effects from treatment: None reported   Medication changes   Clozapine increased to 175 mg at bedtime and titrated upwards  Medication education  Risks side effects benefits and precautions of medications discussed with patient and he did verbalize an understanding about risks for metabolic syndrome from being on neuroleptics and risk for tardive dyskinesia etc.   Understanding of medications: Has some understanding  Justification for dual anti-psychotics: Not applicable    Non-pharmacological treatments  Continue with individual, group, milieu and occupational therapy using recovery principles and psycho-education about accepting illness and the need for treatment.   to contact aunt and explore ACT team  referral which he never had  ECT to be continued  q 2 weekly for maintenance x 6  Refuses to see a dietician and agrees to do more exercises as he is about 100 lbs overweight   Prolactin level high so Risperdal replaced with Abilify which he has tolerated well so far with prolactin level coming back to normal    Safety  Safety and communication plan established to target dynamic risk factors discussed above.    Discharge Plan back to his aunt but may not be eligible for ACT team since he lost his MA benefits and [facility] to figure out alternatives in place for acting and family is trying to put him in the PA able program to see if he could get the ACT services back to be eligible  Interval Progress   Again skipping some meals but tolerating the reintroduction of Clozapine as 150 mg at bedtime which will now be increased dose of 175 mg a day but he feels no difference with the voices that are still threatening.  He has been accepting the Ensure and reminded to drink plenty of fluids.  Continues to report some depression and sadness over the voices but able to contract for safety with no thoughts of suicide.  Has not been aggressive or agitated with no need for behavioral PRNs.  No inappropriate boundary violations reported between a female peer that he hangs out with.  No bouts of aggression or agitation or threatening or self-abusive behaviors with no need for behavioral PRNs in the last 24 hours  acceptance by patient: Accepting  Hopefulness in recovery: Living with his aunt  Involved in reintegration process: Talking with his aunt in the [place] and meeting with aunt and sisters on the unit off and on   trusting in relationship with psychiatrist: Trusting  Sleep: Good  Appetite: Improving and eating all 3 meals  Compliance with Medications: Good  Group attendance: Attending some  Significant events and progress towards goals: No need for behavioral PRNs and tolerating the increase in clozapine so far  but still not eating well    Mental Status Exam  Appearance: age appropriate, improved grooming, looks older than stated age, overweight, with hair in dreadlocks casually dressed with a clean shaven face, fairly groomed with good eye contact found laying on bed sleeping but easily aroused wearing hospital gown stating he has no appetite but plans to eat lunch today   behavior: Friendly cooperative with no good eye contact  speech: normal rate, normal volume, normal pitch  Mood: dysphoric, anxious, continues to report feeling good with the ECT continues to report feeling sad and depressed   affect: constricted, inappropriate, mood-congruent.  Constricted with minimal mood reactivity   thought Process: organized, logical, coherent, goal directed, linear, decreased rate of thoughts, slowing of thoughts, negative thinking, impaired abstract reasoning, concrete  Thought Content: paranoid ideation, some paranoia, grandiose ideas, intrusive thoughts, preoccupied, chronic, continues to report paranoia about people threatening to kill him and his family because of the voices.  He believes ECT has helped so that he is not much in his head.  No other delusions elicited.  No current suicidal or homicidal thoughts and no plans verbalized but today reports having passive death wishes because of voices with no plans and able to CFS and it has not changed yet  .  No phobias obsessions compulsions or distorted body perceptions reported.   Perceptual Disturbances: Continues to report voices that they are threatening to kill him and his family but does not appear responding  Hx Risk Factors: chronic psychiatric problems, chronic anxiety symptoms, chronic psychotic symptoms,    Sensorium: Oriented x 3 spheres and situation  cognition: recent and remote memory grossly intact  Consciousness: alert and awake  Attention: Attention and concentration span good  Intellect: appears to be of average intelligence  Insight: intact  Judgement:  intact  Motor Activity: no abnormal movements     Vitals  Temp:  [97.1 °F (36.2 °C)-98.1 °F (36.7 °C)] 98.1 °F (36.7 °C)  HR:  [70-92] 92  Resp:  [18] 18  BP: (111-131)/(61-79) 112/73  SpO2:  [98 %-99 %] 99 %  No intake or output data in the 24 hours ending 10/13/24 0950      Lab Results: All labs reviewed and prolactin level came down to normal on 8/1/2024, clozapine level 820 on 9/5/24  Current Facility-Administered Medications   Medication Dose Route Frequency Provider Last Rate    acetaminophen  650 mg Oral Q4H PRN Jordan C Holter, DO      acetaminophen  650 mg Oral Q6H PRN HOLLI Lion      aluminum-magnesium hydroxide-simethicone  30 mL Oral Q4H PRN Jordan C Holter, DO      amLODIPine  5 mg Oral Daily HOLLI Lion      ARIPiprazole  30 mg Oral Daily Bora Rosario MD      Artificial Tears  1 drop Both Eyes Q3H PRN Jordan C Holter, DO      atropine  1 drop Sublingual HS Harjeet Torres,       haloperidol lactate  2.5 mg Intramuscular Q4H PRN Max 4/day STEVE LionNP      And    LORazepam  1 mg Intramuscular Q4H PRN Max 4/day HOLLI Lion      And    benztropine  0.5 mg Intramuscular Q4H PRN Max 4/day HOLLI Lion      benztropine  1 mg Intramuscular Q4H PRN Max 6/day Jordan C Holter, DO      haloperidol lactate  5 mg Intramuscular Q4H PRN Max 4/day HOLLI Lion      And    LORazepam  2 mg Intramuscular Q4H PRN Max 4/day HOLLI Lion      And    benztropine  1 mg Intramuscular Q4H PRN Max 4/day HOLLI Lion      benztropine  1 mg Oral Q4H PRN Max 6/day HOLLI Lion      benztropine  1 mg Oral Q4H PRN Max 6/day Jordan C Holter, DO      bisacodyl  10 mg Rectal Daily PRN HOLLI Lion      calcium carbonate  500 mg Oral BID PRN HOLLI Monge      cloZAPine  150 mg Oral HS Bora Rosario MD      hydrOXYzine HCL  50 mg Oral Q6H PRN Max 4/day Jordan C Holter, DO      Or    diphenhydrAMINE  50 mg Intramuscular Q6H PRN Jordan C Holter, DO       hydrOXYzine HCL  50 mg Oral Q6H PRN Max 4/day HOLLI Lion      Or    diphenhydrAMINE  50 mg Intramuscular Q6H PRN HOLLI Lion      diphenhydrAMINE-zinc acetate   Topical BID PRN HOLLI Lion      escitalopram  20 mg Oral Daily HOLLI Lion      famotidine  20 mg Oral BID PRN HOLLI Monge      fluticasone  1 spray Each Nare Daily PRN HOLLI Monge      glycopyrrolate  1 mg Oral BID (AM & Afternoon) Bora Rosario MD      glycopyrrolate  2 mg Oral HS Bora Rosario MD      haloperidol  1 mg Oral Q6H PRN HOLLI Lion      haloperidol  2.5 mg Oral Q4H PRN Max 4/day HOLLI Lion      haloperidol  5 mg Oral Q4H PRN Max 4/day HOLLI Lion      hydroCHLOROthiazide  12.5 mg Oral Daily HOLLI Galvan      hydrocortisone   Topical 4x Daily PRN HOLLI Lion      hydrOXYzine HCL  100 mg Oral Q6H PRN Max 4/day [facility] Holter, DO      Or    LORazepam  2 mg Intramuscular Q6H PRN [facility] Holter, DO      hydrOXYzine HCL  100 mg Oral Q6H PRN Max 4/day HOLLI Lion      Or    LORazepam  2 mg Intramuscular Q6H PRN HOLLI Lion      hydrOXYzine HCL  25 mg Oral Q6H PRN Max 4/day Ion  Holter, DO      ibuprofen  600 mg Oral Q8H PRN HOLLI Lion      influenza vaccine  0.5 mL Intramuscular Once Bora Rosario MD      loratadine  10 mg Oral Daily Brina Guillen MD      melatonin  3 mg Oral HS PRN Bora Rosario MD      melatonin  9 mg Oral HS Bora Rosario MD      methocarbamol  500 mg Oral Q6H PRN HOLLI Lion      multivitamin-minerals  1 tablet Oral Daily HOLLI Monge      nicotine polacrilex  2 mg Oral Q4H PRN Bora Rosario MD      OLANZapine  5 mg Oral Q4H PRN Max 3/day Ion C Holter, DO      Or    OLANZapine  2.5 mg Intramuscular Q4H PRN Max 3/day Ion C Holter, DO      OLANZapine  5 mg Oral Q3H PRN Max 3/day Ion Dupontter, DO      Or    OLANZapine  5 mg Intramuscular Q3H PRN Max 3/day Ion FISCHER  Cresencioter, DO      OLANZapine  2.5 mg Oral Q4H PRN Max 6/day Jordan C Holter, DO      ondansetron  4 mg Oral Q6H PRN HOLLI Lion      pantoprazole  20 mg Oral QAM Jordan C Holter, DO      polyethylene glycol  17 g Oral Daily PRN HOLLI Ghotra      propranolol  10 mg Oral Q12H KAYLIE HOLLI Lion      senna-docusate sodium  1 tablet Oral Daily PRN Jordan C Holter, DO      senna-docusate sodium  2 tablet Oral HS Melvina Ambron, DO      traZODone  50 mg Oral HS PRN HOLLI Lion      white petrolatum-mineral oil   Topical TID PRN HOLLI Lion         Counseling / Coordination of Care: Total floor / unit time spent today 15 minutes. Greater than 50% of total time was spent with the patient and / or family counseling and / or somewhat receptive to supportive listening and teaching positive coping skills to deal with symptom mangement.     Patient's Rights, confidentiality and exceptions to confidentiality, use of automated medical record, Behavioral Health Services staff access to medical record, and consent to treatment reviewed.    This note has been dictated and hence there may be problems with punctuation, spelling and formatting and if anyone has any concerns please address them to Dr. Rosario   This note is not shared with patient due to potential for making patient's condition worse by knowing the content of the note.

## 2024-10-14 PROCEDURE — 99232 SBSQ HOSP IP/OBS MODERATE 35: CPT | Performed by: PSYCHIATRY & NEUROLOGY

## 2024-10-14 RX ORDER — CLOZAPINE 200 MG/1
200 TABLET ORAL
Status: DISCONTINUED | OUTPATIENT
Start: 2024-10-14 | End: 2024-10-16

## 2024-10-14 RX ADMIN — MULTIPLE VITAMINS W/ MINERALS TAB 1 TABLET: TAB ORAL at 08:36

## 2024-10-14 RX ADMIN — GLYCOPYRROLATE 2 MG: 1 TABLET ORAL at 21:24

## 2024-10-14 RX ADMIN — HYDROCHLOROTHIAZIDE 12.5 MG: 12.5 TABLET ORAL at 08:35

## 2024-10-14 RX ADMIN — AMLODIPINE BESYLATE 5 MG: 5 TABLET ORAL at 08:35

## 2024-10-14 RX ADMIN — SENNOSIDES AND DOCUSATE SODIUM 2 TABLET: 8.6; 5 TABLET ORAL at 21:26

## 2024-10-14 RX ADMIN — CLOZAPINE 200 MG: 200 TABLET ORAL at 21:26

## 2024-10-14 RX ADMIN — NICOTINE POLACRILEX 2 MG: 2 GUM, CHEWING ORAL at 17:13

## 2024-10-14 RX ADMIN — NICOTINE POLACRILEX 2 MG: 2 GUM, CHEWING ORAL at 12:49

## 2024-10-14 RX ADMIN — PANTOPRAZOLE SODIUM 20 MG: 20 TABLET, DELAYED RELEASE ORAL at 08:35

## 2024-10-14 RX ADMIN — ARIPIPRAZOLE 30 MG: 15 TABLET ORAL at 08:35

## 2024-10-14 RX ADMIN — ESCITALOPRAM OXALATE 20 MG: 10 TABLET, FILM COATED ORAL at 08:35

## 2024-10-14 RX ADMIN — NICOTINE POLACRILEX 2 MG: 2 GUM, CHEWING ORAL at 08:35

## 2024-10-14 RX ADMIN — LORATADINE 10 MG: 10 TABLET ORAL at 08:35

## 2024-10-14 RX ADMIN — GLYCOPYRROLATE 1 MG: 1 TABLET ORAL at 01:00

## 2024-10-14 RX ADMIN — PROPRANOLOL HYDROCHLORIDE 10 MG: 10 TABLET ORAL at 08:35

## 2024-10-14 RX ADMIN — GLYCOPYRROLATE 1 MG: 1 TABLET ORAL at 08:36

## 2024-10-14 RX ADMIN — MELATONIN TAB 3 MG 9 MG: 3 TAB at 21:25

## 2024-10-14 NOTE — PROGRESS NOTES
"  Progress Note - Behavioral Health   Name: Alberto Berumen 28 y.o. male I MRN: [id number]  Unit/Bed#: [facility] 108-02 I Date of Admission: 3/29/2024   Date of Service: 10/14/2024 I Hospital Day: 199     Assessment & Plan  Schizoaffective disorder, bipolar type (HCC)  Reviewed 10/14/24    Patient remains with ongoing depressive symptoms and ongoing auditory hallucinations of \"loud, angry, irritating voices.\" Remains with paranoia surrounding these voices and fears that someone is out to harm him.    Continue Clozapine 175 mg at bedtime for psychosis  Continue to obtain CBC (weekly), BNP (every 2 weeks), CK (weekly) for ongoing clozapine monitoring    Continue Abilify 30 mg daily for psychosis  Continue escitalopram 20 mg tablet daily for depression and anxiety  Continue senna-docusate sodium two 50 mg tablets daily before bed for constipation    Continue propanolol 10 mg twice daily for anxiety     No associated orders from this encounter found during lookback period of 72 hours.    Chronic idiopathic constipation  Reviewed 10/14/24    Continue senna-docusate sodium two tablets before bed per medical team    No associated orders from this encounter found during lookback period of 72 hours.  GERD (gastroesophageal reflux disease)  Reviewed 10/14/24    Continue famotidine 20 mg tablet BID per medical team  Continue glycopyrrolate 1 mg tablet BID AM and afternoon per medical team  Continue glycopyrrolate 2 mg tablet before bed per medical team  Continue pantoprazole EC 20 mg tablet every morning per medical team  Medical clearance for psychiatric admission  Reviewed 10/14/24    Ongoing by medical  Tobacco abuse  Reviewed 10/14/24    Continue to encourage cessation upon discharge  Elevated hemoglobin A1c  Reviewed 10/14/24    Continue metformin 500 mg tablet BID with meals per medical team    Plan:  Continue prescribed psychotropic medication regimen  Currently on 201 commitment status  Continue to promote patient " "participation in therapeutic milieu.  Continue medical management per medicine.  Continue behavioral health checks q.15 minutes.   Continue vitals per behavioral health unit protocol.  Safety: Safety and communication plan established to target dynamic risk factors discussed above.   Discharge and disposition: At this time plans are being made to discharge patient to his aunt's house following clinical stabilization    SUBJECTIVE     Patient evaluated this a.m. for continuity of care. Patient was discussed at length with nursing and treatment team. Per nursing, patient continues to make slow progress. Unfortunately, he continues to struggle with ongoing auditory hallucinations that are derogatory in nature. Yesterday afternoon he continued to endorse to nursing continued auditory hallucinations that say they are going to kill him when he leaves the hospital. On evening nursing assessment he remained visible on the unit, social with peers and staff. On the inpatient unit Alberto has been medication and meal compliant. He attended 4/9 groups yesterday.    During evaluation, no acute distress is noted. Alberto Berumen reports feeling \"the same.\"    At the time of interview today, Alberto appeared notably more sedated than previous interviews and he was noted to be scant in speech, with poor eye contact. Alberto reports that he feels increasingly tired today.    Unfortunately, Alberto reports that he continues to remain with ongoing psychosis. He continues to endorse derogatory auditory hallucinations that are persecutory in nature, stating \"the voices are the same, I feel the same.\"     At this time, Alberto continues to struggle with his appetite and states he has not been eating much on the inpatient unit. He continues to rate his depression as a 9/10 and anxiety as a 7/10 (with 10 being the most severe symptomatology).     Alberto remains with a decreased appetite. His sleep continues to improve and he slept well " last night. He reports that he has been going to the bathroom without difficulty.    At the present moment Alberto remains with passive suicidal ideation with no plan or intent to harm himself. He denies homicidal ideation. He remains with ongoing derogatory auditory hallucinations. He denies visual hallucinations.      PSYCHIATRIC REVIEW OF SYSTEMS     Behavior over the last 24 hours:  improved  Sleep: Improving, slept well last night  Appetite: Remains with poor appetite  Medication side effects: Fatigue    Progress Toward Goals: Slow progress, as evidenced by their participation in individual, social and therapeutic milieu in addition to adherence to their medication regimen.  Patient Acceptance: Fair  Patient Understanding: Limited  Patient Involvement in Reintegration Process: Patient remains in contact with his aunt on the phone  Patient's Therapeutic Relationship with Psychiatrist: Trusting  Patient's Optimism Regarding Recovery: Patient is hopeful to discharge to his aunt's house following the coordination of ACT services  Group Attendance: 4/9    REVIEW OF SYSTEMS   Review of systems: sedation    OBJECTIVE     Vital Signs in Past 24 Hours:  Temp:  [97.3 °F (36.3 °C)] 97.3 °F (36.3 °C)  HR:  [89-94] 89  BP: (122-128)/(62-80) 128/62  Resp:  [18] 18  SpO2:  [98 %] 98 %    Intake/Output in Past 24 hours:  No intake/output data recorded.  No intake/output data recorded.        Laboratory Results:  I have personally reviewed all pertinent laboratory/tests results.  Most Recent Labs:   Lab Results   Component Value Date    WBC 10.28 (H) 10/08/2024    RBC 5.65 (H) 10/08/2024    HGB 13.6 10/08/2024    HCT 42.1 10/08/2024     10/08/2024    RDW 14.3 10/08/2024    NEUTROABS 5.38 10/08/2024    SODIUM 139 10/01/2024    K 3.7 10/01/2024     10/01/2024    CO2 28 10/01/2024    BUN 10 10/01/2024    CREATININE 0.98 10/01/2024    GLUC 111 10/01/2024    GLUF 83 09/30/2024    CALCIUM 10.3 (H) 10/01/2024    AST 26  09/30/2024    ALT 42 09/30/2024    ALKPHOS 64 09/30/2024    TP 6.6 09/30/2024    ALB 3.8 09/30/2024    TBILI 0.47 09/30/2024    CHOLESTEROL 156 03/14/2024    HDL 44 03/14/2024    TRIG 133 03/14/2024    LDLCALC 85 03/14/2024    NONHDLC 112 03/14/2024    LITHIUM 0.61 01/09/2024    [id number] 1.062 11/15/2023    HGBA1C 5.0 04/01/2024    EAG 97 04/01/2024       Behavioral Health Medications: all current active meds have been reviewed and continue current psychiatric medications.    Current Facility-Administered Medications   Medication Dose Route Frequency Provider Last Rate    acetaminophen  650 mg Oral Q4H PRN Jordan C Holter, DO      acetaminophen  650 mg Oral Q6H PRN HOLLI Lion      aluminum-magnesium hydroxide-simethicone  30 mL Oral Q4H PRN Jordan C Holter, DO      amLODIPine  5 mg Oral Daily HOLLI Lion      ARIPiprazole  30 mg Oral Daily Bora Rosario MD      Artificial Tears  1 drop Both Eyes Q3H PRN Jordan C Holter, DO      atropine  1 drop Sublingual HS Harjeet Torres DO      haloperidol lactate  2.5 mg Intramuscular Q4H PRN Max 4/day HOLLI Lion      And    LORazepam  1 mg Intramuscular Q4H PRN Max 4/day HOLLI Lion      And    benztropine  0.5 mg Intramuscular Q4H PRN Max 4/day HOLLI Lion      benztropine  1 mg Intramuscular Q4H PRN Max 6/day Jordan C Holter, DO      haloperidol lactate  5 mg Intramuscular Q4H PRN Max 4/day HOLLI Lion      And    LORazepam  2 mg Intramuscular Q4H PRN Max 4/day HOLLI Lion      And    benztropine  1 mg Intramuscular Q4H PRN Max 4/day HOLLI Lion      benztropine  1 mg Oral Q4H PRN Max 6/day HOLLI Lion      benztropine  1 mg Oral Q4H PRN Max 6/day Jordan C Holter, DO      bisacodyl  10 mg Rectal Daily PRN Eveline Hangey, CRNP      calcium carbonate  500 mg Oral BID PRN HOLLI Monge      cloZAPine  175 mg Oral HS Bora Rosario MD      hydrOXYzine HCL  50 mg Oral Q6H PRN Max 4/day Ion FISCHER  Holter, DO      Or    diphenhydrAMINE  50 mg Intramuscular Q6H PRN Jordan C Holter, DO      hydrOXYzine HCL  50 mg Oral Q6H PRN Max 4/day HOLLI Lion      Or    diphenhydrAMINE  50 mg Intramuscular Q6H PRN HOLLI Lion      diphenhydrAMINE-zinc acetate   Topical BID PRN HOLLI Lion      escitalopram  20 mg Oral Daily HOLLI Lion      famotidine  20 mg Oral BID PRN HOLLI Monge      fluticasone  1 spray Each Nare Daily PRN HOLLI Monge      glycopyrrolate  1 mg Oral BID (AM & Afternoon) Bora Rosario MD      glycopyrrolate  2 mg Oral HS Bora Rosario MD      haloperidol  1 mg Oral Q6H PRN HOLLI Lion      haloperidol  2.5 mg Oral Q4H PRN Max 4/day HOLLI Lion      haloperidol  5 mg Oral Q4H PRN Max 4/day HOLLI Lion      hydroCHLOROthiazide  12.5 mg Oral Daily Pia Mantilla, HOLLI      hydrocortisone   Topical 4x Daily PRN HOLLI Lion      hydrOXYzine HCL  100 mg Oral Q6H PRN Max 4/day Jordan C Holter, DO      Or    LORazepam  2 mg Intramuscular Q6H PRN Jordan C Holter, DO      hydrOXYzine HCL  100 mg Oral Q6H PRN Max 4/day HOLLI Lion      Or    LORazepam  2 mg Intramuscular Q6H PRN HOLLI Lion      hydrOXYzine HCL  25 mg Oral Q6H PRN Max 4/day Jordan C Holter, DO      ibuprofen  600 mg Oral Q8H PRN HOLLI Lion      influenza vaccine  0.5 mL Intramuscular Once Bora Rosario MD      loratadine  10 mg Oral Daily Brina Guillen MD      melatonin  3 mg Oral HS PRN Bora Rosario MD      melatonin  9 mg Oral HS Bora Rosario MD      methocarbamol  500 mg Oral Q6H PRN HOLLI Lion      multivitamin-minerals  1 tablet Oral Daily HOLLI Monge      nicotine polacrilex  2 mg Oral Q4H PRN Bora Rosario MD      OLANZapine  5 mg Oral Q4H PRN Max 3/day Ion C Holter, DO      Or    OLANZapine  2.5 mg Intramuscular Q4H PRN Max 3/day Ion FISCHER Holter, DO      OLANZapine  5 mg Oral Q3H PRN Max 3/day  "Jordan C Holter, DO      Or    OLANZapine  5 mg Intramuscular Q3H PRN Max 3/day Jordan C Holter, DO      OLANZapine  2.5 mg Oral Q4H PRN Max 6/day Jordan C Holter, DO      ondansetron  4 mg Oral Q6H PRN HOLLI Lion      pantoprazole  20 mg Oral QAM Jordan C Holter, DO      polyethylene glycol  17 g Oral Daily PRN HOLLI hGotra      propranolol  10 mg Oral Q12H KAYLIE HOLLI Lion      senna-docusate sodium  1 tablet Oral Daily PRN Jordan C Holter,       senna-docusate sodium  2 tablet Oral HS Melvina Ambron, DO      traZODone  50 mg Oral HS PRN HOLLI Lion      white petrolatum-mineral oil   Topical TID PRN HOLLI Lion         Risks, benefits and possible side effects of Medications:   Risks, benefits, and possible side effects of medications explained to patient and patient verbalizes understanding.      Dual Antipsychotic Rationale: At this time patient is on dual antipsychotics due to inability to control symptomatology with a singular antipsychotic agent    Mental Status Evaluation:  Appearance:  Age-appropriate, casually dressed, overweight, marginal grooming and hygiene, poor eye contact, no acute distress   Behavior:  Polite, limited interview cooperation   Speech:  Scant, soft   Mood:  \"The same\"   Affect:  Blunted, depressed   Thought Process:  [place], linear, goal directed   Associations: [place] associations   Thought Content:  Remains with some paranoia in the context of ongoing struggles with auditory hallucinations   Perceptual Disturbances: Continues to report ongoing auditory hallucinations that make derogatory comments that they are going to kill him.  He denies visual hallucinations.  He continues to appear internally preoccupied.   Risk Potential: Suicidal ideation -reports ongoing passive suicidal ideation with no active plan to harm himself  Homicidal ideation - None  Potential for aggression - No   Sensorium:  oriented to person, place, and time/date   Memory:  " recent and remote memory grossly intact   Consciousness:  alert and awake   Attention/Concentration: attention span and concentration are age appropriate   Insight:  limited   Judgment: limited   Gait/Station: in bed   Motor Activity: no abnormal movements     Recommended Treatment:   See above for assessment and plan.    Inpatient Psychiatric Certification:  Patient is not at goal. They are not yet ready for discharge. The patient's condition currently requires active psychopharmacological medication management, interdisciplinary coordination with case management, and the utilization of adjunctive milieu and group therapy to augment psychopharmacological efficacy. The patient's risk of morbidity, and progression or decompensation of psychiatric disease, is higher without this current treatment. Based upon physical, mental and social evaluations, I certify that inpatient psychiatric services are medically necessary for this patient for a duration of >2 midnights for the treatment of Schizoaffective disorder, bipolar type (HCC)  Available alternative community resources do not meet the patient's mental health care needs.  I further attest that an established written individualized plan of care has been implemented and is outlined in the patient's medical records.    Counseling/Coordination of Care    Total unit time spent today was greater than 15 minutes. Greater than 50% of total time was spent with the patient and/or patient's relatives and/or coordination of patient's care.     Patient's rights, confidentiality, exceptions to confidentiality, use of electronic medical record including appropriate staff access to medical record regarding behavioral health services and consent to treatment were reviewed.    Note Share:     This note was not shared with the patient due to reasonable likelihood of causing patient harm     Hardik So MD   Psychiatry, PGY-4

## 2024-10-14 NOTE — PLAN OF CARE
Problem: Ineffective Coping  Goal: Identifies ineffective coping skills  Outcome: Progressing  Goal: Identifies healthy coping skills  Outcome: Progressing  Goal: Demonstrates healthy coping skills  Outcome: Progressing  Goal: Participates in unit activities  Description: Interventions:  - Provide therapeutic environment   - Provide required programming   - Redirect inappropriate behaviors   Outcome: Not Progressing

## 2024-10-14 NOTE — ASSESSMENT & PLAN NOTE
Reviewed 10/14/24    Continue senna-docusate sodium two tablets before bed per medical team    No associated orders from this encounter found during lookback period of 72 hours.

## 2024-10-14 NOTE — PROGRESS NOTES
10/14/24 0822   Team Meeting   Meeting Type Daily Rounds   Team Members Present   Team Members Present Physician;Nurse;;Other (Discipline and Name)   Physician Team Member Holter, Thomas   Nursing Team Member Claudia   Social Work Team Member Jose J ORTEGA   Patient/Family Present   Patient Present No   Patient's Family Present No     Groups Participation  4/9.   Patient's compliant with medications. He's engaged in his treatment. He appears depressed at times and AH. Decreased appetite. Increased anxiety. Clozaril increased to 175,mg QHS.

## 2024-10-14 NOTE — NURSING NOTE
Alberto is visible and brightens on approach. He states he is starting to begin to feel a bit better. He said he knows it is going to take time. He walks the halls during walking group. He stays visible and says he remains depressed and anxious but it is improving  He went to bed before snack and stayed then and had to be awakened for HS medications. Prpranolol was held due to hypotension (but he was in bed for several hours)  Fluids encouraged and taking them well

## 2024-10-14 NOTE — ASSESSMENT & PLAN NOTE
Reviewed 10/14/24    Continue famotidine 20 mg tablet BID per medical team  Continue glycopyrrolate 1 mg tablet BID AM and afternoon per medical team  Continue glycopyrrolate 2 mg tablet before bed per medical team  Continue pantoprazole EC 20 mg tablet every morning per medical team

## 2024-10-14 NOTE — ASSESSMENT & PLAN NOTE
"Reviewed 10/14/24    Patient remains with ongoing depressive symptoms and ongoing auditory hallucinations of \"loud, angry, irritating voices.\" Remains with paranoia surrounding these voices and fears that someone is out to harm him.    Continue Clozapine 175 mg at bedtime for psychosis  Continue to obtain CBC (weekly), BNP (every 2 weeks), CK (weekly) for ongoing clozapine monitoring    Continue Abilify 30 mg daily for psychosis  Continue escitalopram 20 mg tablet daily for depression and anxiety  Continue senna-docusate sodium two 50 mg tablets daily before bed for constipation    Continue propanolol 10 mg twice daily for anxiety     No associated orders from this encounter found during lookback period of 72 hours.    "

## 2024-10-14 NOTE — PLAN OF CARE
Problem: Alteration in Thoughts and Perception  Goal: Verbalize thoughts and feelings  Description: Interventions:  - Promote a nonjudgmental and trusting relationship with the patient through active listening and therapeutic communication  - Assess patient's level of functioning, behavior and potential for risk  - Engage patient in 1 on 1 interactions  - Encourage patient to express fears, feelings, frustrations, and discuss symptoms    - [place] patient to reality, help patient recognize reality-based thinking   - Administer medications as ordered and assess for potential side effects  - Provide the patient education related to the signs and symptoms of the illness and desired effects of prescribed medications  Outcome: Progressing  Goal: Agree to be compliant with medication regime, as prescribed and report medication side effects  Description: Interventions:  - Offer appropriate PRN medication and supervise ingestion; conduct AIMS, as needed   Outcome: Progressing  Goal: Complete daily ADLs, including personal hygiene independently, as able  Description: Interventions:  - Observe, teach, and assist patient with ADLS  - Monitor and promote a balance of rest/activity, with adequate nutrition and elimination   Outcome: Progressing     Problem: Depression  Goal: Refrain from harming self  Description: Interventions:  - Monitor patient closely, per order   - Supervise medication ingestion, monitor effects and side effects   Outcome: Progressing  Goal: Refrain from isolation  Description: Interventions:  - Develop a trusting relationship   - Encourage socialization   Outcome: Progressing  Goal: Refrain from self-neglect  Outcome: Progressing     Problem: Anxiety  Goal: Anxiety is at manageable level  Description: Interventions:  - Assess and monitor patient's anxiety level.   - Monitor for signs and symptoms (heart palpitations, chest pain, shortness of breath, headaches, nausea, feeling jumpy, restlessness,  irritable, apprehensive).   - Collaborate with interdisciplinary team and initiate plan and interventions as ordered.  - [place] patient to unit/surroundings  - Explain treatment plan  - Encourage participation in care  - Encourage verbalization of concerns/fears  - Identify coping mechanisms  - Assist in developing anxiety-reducing skills  - Administer/offer alternative therapies  - Limit or eliminate stimulants  Outcome: Progressing     Problem: Alteration in Orientation  Goal: Interact with staff daily  Description: Interventions:  - Assess and re-assess patient's level of orientation  - Engage patient in 1 on 1 interactions, daily, for a minimum of 15 minutes   - Establish rapport/trust with patient   Outcome: Progressing     Problem: Alteration in Thoughts and Perception  Goal: Treatment Goal: Gain control of psychotic behaviors/thinking, reduce/eliminate presenting symptoms and demonstrate improved reality functioning upon discharge  Outcome: Not Progressing  Goal: Refrain from acting on delusional thinking/internal stimuli  Description: Interventions:  - Monitor patient closely, per order   - Utilize least restrictive measures   - Set reasonable limits, give positive feedback for acceptable   - Administer medications as ordered and monitor of potential side effects  Outcome: Not Progressing

## 2024-10-14 NOTE — NURSING NOTE
Patient is with flat affect, does brighten with interactions. Pt doesn't eat breakfast but does have good appetite at lunch with 100%. Pt takes medications without issue.

## 2024-10-15 PROCEDURE — 99232 SBSQ HOSP IP/OBS MODERATE 35: CPT | Performed by: PSYCHIATRY & NEUROLOGY

## 2024-10-15 RX ADMIN — PANTOPRAZOLE SODIUM 20 MG: 20 TABLET, DELAYED RELEASE ORAL at 08:30

## 2024-10-15 RX ADMIN — CLOZAPINE 200 MG: 200 TABLET ORAL at 21:06

## 2024-10-15 RX ADMIN — GLYCOPYRROLATE 2 MG: 1 TABLET ORAL at 21:06

## 2024-10-15 RX ADMIN — MULTIPLE VITAMINS W/ MINERALS TAB 1 TABLET: TAB ORAL at 08:32

## 2024-10-15 RX ADMIN — PROPRANOLOL HYDROCHLORIDE 10 MG: 10 TABLET ORAL at 21:06

## 2024-10-15 RX ADMIN — SENNOSIDES AND DOCUSATE SODIUM 2 TABLET: 8.6; 5 TABLET ORAL at 21:06

## 2024-10-15 RX ADMIN — ARIPIPRAZOLE 30 MG: 15 TABLET ORAL at 08:33

## 2024-10-15 RX ADMIN — NICOTINE POLACRILEX 2 MG: 2 GUM, CHEWING ORAL at 12:54

## 2024-10-15 RX ADMIN — GLYCOPYRROLATE 1 MG: 1 TABLET ORAL at 08:31

## 2024-10-15 RX ADMIN — LORATADINE 10 MG: 10 TABLET ORAL at 08:32

## 2024-10-15 RX ADMIN — GLYCOPYRROLATE 1 MG: 1 TABLET ORAL at 13:04

## 2024-10-15 RX ADMIN — HYDROCHLOROTHIAZIDE 12.5 MG: 12.5 TABLET ORAL at 08:32

## 2024-10-15 RX ADMIN — ESCITALOPRAM OXALATE 20 MG: 10 TABLET, FILM COATED ORAL at 08:31

## 2024-10-15 RX ADMIN — PROPRANOLOL HYDROCHLORIDE 10 MG: 10 TABLET ORAL at 08:30

## 2024-10-15 RX ADMIN — NICOTINE POLACRILEX 2 MG: 2 GUM, CHEWING ORAL at 20:04

## 2024-10-15 RX ADMIN — MELATONIN TAB 3 MG 9 MG: 3 TAB at 21:06

## 2024-10-15 RX ADMIN — HALOPERIDOL 5 MG: 5 TABLET ORAL at 17:00

## 2024-10-15 NOTE — NURSING NOTE
Alberto received a prn of Haldol po for agitation and it was effective and after that he fell asleep for a shirt time. Then he got up and attended the 1930 Nursing Group   The Haldol was very effective. He was calm and in much better spirits for the rest of the entire evening. He was walking the halls at 2130 with a peer (RB) talking kindly to him and laughing; he was doing so much better

## 2024-10-15 NOTE — PLAN OF CARE
Problem: Alteration in Thoughts and Perception  Goal: Verbalize thoughts and feelings  Description: Interventions:  - Promote a nonjudgmental and trusting relationship with the patient through active listening and therapeutic communication  - Assess patient's level of functioning, behavior and potential for risk  - Engage patient in 1 on 1 interactions  - Encourage patient to express fears, feelings, frustrations, and discuss symptoms    - [place] patient to reality, help patient recognize reality-based thinking   - Administer medications as ordered and assess for potential side effects  - Provide the patient education related to the signs and symptoms of the illness and desired effects of prescribed medications  Outcome: Progressing  Goal: Refrain from acting on delusional thinking/internal stimuli  Description: Interventions:  - Monitor patient closely, per order   - Utilize least restrictive measures   - Set reasonable limits, give positive feedback for acceptable   - Administer medications as ordered and monitor of potential side effects  Outcome: Progressing  Goal: Agree to be compliant with medication regime, as prescribed and report medication side effects  Description: Interventions:  - Offer appropriate PRN medication and supervise ingestion; conduct AIMS, as needed   Outcome: Progressing  Goal: Complete daily ADLs, including personal hygiene independently, as able  Description: Interventions:  - Observe, teach, and assist patient with ADLS  - Monitor and promote a balance of rest/activity, with adequate nutrition and elimination   Outcome: Progressing     Problem: Depression  Goal: Refrain from harming self  Description: Interventions:  - Monitor patient closely, per order   - Supervise medication ingestion, monitor effects and side effects   Outcome: Progressing     Problem: Anxiety  Goal: Anxiety is at manageable level  Description: Interventions:  - Assess and monitor patient's anxiety level.   -  Monitor for signs and symptoms (heart palpitations, chest pain, shortness of breath, headaches, nausea, feeling jumpy, restlessness, irritable, apprehensive).   - Collaborate with interdisciplinary team and initiate plan and interventions as ordered.  - [place] patient to unit/surroundings  - Explain treatment plan  - Encourage participation in care  - Encourage verbalization of concerns/fears  - Identify coping mechanisms  - Assist in developing anxiety-reducing skills  - Administer/offer alternative therapies  - Limit or eliminate stimulants  Outcome: Progressing

## 2024-10-15 NOTE — ASSESSMENT & PLAN NOTE
"Reviewed 10/14/24    Patient remains with ongoing depressive symptoms and ongoing auditory hallucinations of \"loud, angry, irritating voices.\" Remains with paranoia surrounding these voices and fears that someone is out to harm him.    Continue Clozapine 200 mg at bedtime for psychosis  Continue to obtain CBC (weekly), BNP (every 2 weeks), CK (weekly) for ongoing clozapine monitoring    Continue Abilify 30 mg daily for psychosis  Continue escitalopram 20 mg tablet daily for depression and anxiety  Continue senna-docusate sodium two 50 mg tablets daily before bed for constipation    Continue propanolol 10 mg twice daily for anxiety     No associated orders from this encounter found during lookback period of 72 hours.    "

## 2024-10-15 NOTE — PROGRESS NOTES
10/15/24 0902   Team Meeting   Meeting Type Daily Rounds   Team Members Present   Team Members Present Physician;Nurse;   Physician Team Member Holter, Thomas   Nursing Team Member Claudia   Care Management Team Member Sebastien   Patient/Family Present   Patient Present No   Patient's Family Present No     Appears depressed. Refused breakfast yesterday but ate 100% of lunch and dinner. Bloodwork unable to be drawn this morning, reattempt will be made this afternoon. Reporting increased anxiety and depression. Reports continued AH. Attended 2/10 groups. Propanolol held at bedtime d/t parameters

## 2024-10-15 NOTE — NURSING NOTE
Initially Alberto was calm when I came on duty, but around 1700 he was sitting in the patient lounge and he was becoming increasingly agitated:He was unlike himself saying derogatory negative comments to the techs and was hostile and his behavior had changed. His agitation score was 5  He was given Haldol 5 mg po at 1700

## 2024-10-15 NOTE — NURSING NOTE
Pt is calm, cooperative and visible on unit. Pt reports AH, depression and anxiety; denies SI/HI/VH. Compliant with medications, scheduled Norvasc held d/t parameters. Refused breakfast and consumed 75% of lunch. Pt noted to be brighter after phone call with his sister. Q 15 min checks maintained.

## 2024-10-15 NOTE — NURSING NOTE
One hour after receiving the po Danielle Dominguez's agitation score was 1 and forty minutes after that he was in bed asleep

## 2024-10-15 NOTE — PROGRESS NOTES
"Progress Note - Behavioral Health     Alberto Berumen 28 y.o. male MRN: [id number]   Unit/Bed#: [facility] 108-02 Encounter: [id number]  Assessment & Plan  Schizoaffective disorder, bipolar type (HCC)  Reviewed 10/14/24    Patient remains with ongoing depressive symptoms and ongoing auditory hallucinations of \"loud, angry, irritating voices.\" Remains with paranoia surrounding these voices and fears that someone is out to harm him.    Continue Clozapine 200 mg at bedtime for psychosis  Continue to obtain CBC (weekly), BNP (every 2 weeks), CK (weekly) for ongoing clozapine monitoring    Continue Abilify 30 mg daily for psychosis  Continue escitalopram 20 mg tablet daily for depression and anxiety  Continue senna-docusate sodium two 50 mg tablets daily before bed for constipation    Continue propanolol 10 mg twice daily for anxiety     No associated orders from this encounter found during lookback period of 72 hours.    Chronic idiopathic constipation  Reviewed 10/14/24    Continue senna-docusate sodium two tablets before bed per medical team    No associated orders from this encounter found during lookback period of 72 hours.  GERD (gastroesophageal reflux disease)  Reviewed 10/14/24    Continue famotidine 20 mg tablet BID per medical team  Continue glycopyrrolate 1 mg tablet BID AM and afternoon per medical team  Continue glycopyrrolate 2 mg tablet before bed per medical team  Continue pantoprazole EC 20 mg tablet every morning per medical team  Medical clearance for psychiatric admission  Reviewed 10/14/24    Ongoing by medical  Tobacco abuse  Reviewed 10/14/24    Continue to encourage cessation upon discharge  Elevated hemoglobin A1c  Reviewed 10/14/24    Continue metformin 500 mg tablet BID with meals per medical team       Recommended Treatment:     Planned medication and treatment changes:    All current active medications have been reviewed  Encourage group therapy, milieu therapy and occupational " therapy  Behavioral Health checks for safety monitoring      Current medications:  Current Facility-Administered Medications   Medication Dose Route Frequency Provider Last Rate    acetaminophen  650 mg Oral Q4H PRN Jordan C Holter,       acetaminophen  650 mg Oral Q6H PRN HOLLI Lion      aluminum-magnesium hydroxide-simethicone  30 mL Oral Q4H PRN Jordan C Holter, DO      amLODIPine  5 mg Oral Daily HOLLI Lion      ARIPiprazole  30 mg Oral Daily Bora Rosario MD      Artificial Tears  1 drop Both Eyes Q3H PRN Jordan C Holter, DO      atropine  1 drop Sublingual HS Harjeet Torres,       haloperidol lactate  2.5 mg Intramuscular Q4H PRN Max 4/day HOLLI Lion      And    LORazepam  1 mg Intramuscular Q4H PRN Max 4/day HOLLI Lion      And    benztropine  0.5 mg Intramuscular Q4H PRN Max 4/day HOLLI Lion      benztropine  1 mg Intramuscular Q4H PRN Max 6/day Jordan C Holter,       haloperidol lactate  5 mg Intramuscular Q4H PRN Max 4/day HLOLI Lion      And    LORazepam  2 mg Intramuscular Q4H PRN Max 4/day HOLLI Lion      And    benztropine  1 mg Intramuscular Q4H PRN Max 4/day HOLLI Lion      benztropine  1 mg Oral Q4H PRN Max 6/day HOLLI Lion      benztropine  1 mg Oral Q4H PRN Max 6/day Jordan C Holter, DO      bisacodyl  10 mg Rectal Daily PRN HOLLI Lion      calcium carbonate  500 mg Oral BID PRN HOLLI Monge      cloZAPine  200 mg Oral HS Bora Rosario MD      hydrOXYzine HCL  50 mg Oral Q6H PRN Max 4/day Jordan C Holter, DO      Or    diphenhydrAMINE  50 mg Intramuscular Q6H PRN Jordan C Holter, DO      hydrOXYzine HCL  50 mg Oral Q6H PRN Max 4/day HOLLI Lion      Or    diphenhydrAMINE  50 mg Intramuscular Q6H PRN HOLLI Lion      diphenhydrAMINE-zinc acetate   Topical BID PRN HOLLI Lion      escitalopram  20 mg Oral Daily HOLLI Lion      famotidine  20 mg Oral BID PRN Eileen  HOLLI Higuera      fluticasone  1 spray Each Nare Daily PRN HOLLI Monge      glycopyrrolate  1 mg Oral BID (AM & Afternoon) Bora Rosario MD      glycopyrrolate  2 mg Oral HS Bora Rosario MD      haloperidol  1 mg Oral Q6H PRN HOLLI Lion      haloperidol  2.5 mg Oral Q4H PRN Max 4/day HOLLI Lion      haloperidol  5 mg Oral Q4H PRN Max 4/day HOLLI Lion      hydroCHLOROthiazide  12.5 mg Oral Daily HOLLI Galvan      hydrocortisone   Topical 4x Daily PRN HOLLI Lion      hydrOXYzine HCL  100 mg Oral Q6H PRN Max 4/day [facility] Holter, DO      Or    LORazepam  2 mg Intramuscular Q6H PRN [facility] Holter, DO      hydrOXYzine HCL  100 mg Oral Q6H PRN Max 4/day HOLLI Lion      Or    LORazepam  2 mg Intramuscular Q6H PRN HOLLI Lion      hydrOXYzine HCL  25 mg Oral Q6H PRN Max 4/day [facility] Holter, DO      ibuprofen  600 mg Oral Q8H PRN HOLLI Lion      influenza vaccine  0.5 mL Intramuscular Once Bora Rosario MD      loratadine  10 mg Oral Daily Brina Guillen MD      melatonin  3 mg Oral HS PRN Bora Rosario MD      melatonin  9 mg Oral HS Bora Rosario MD      methocarbamol  500 mg Oral Q6H PRN HOLLI Lion      multivitamin-minerals  1 tablet Oral Daily HOLLI Monge      nicotine polacrilex  2 mg Oral Q4H PRN Bora Rosario MD      OLANZapine  5 mg Oral Q4H PRN Max 3/day [facility] Holter, DO      Or    OLANZapine  2.5 mg Intramuscular Q4H PRN Max 3/day [facility] Holter, DO      OLANZapine  5 mg Oral Q3H PRN Max 3/day [facility] Holter, DO      Or    OLANZapine  5 mg Intramuscular Q3H PRN Max 3/day [facility] Holter, DO      OLANZapine  2.5 mg Oral Q4H PRN Max 6/day [facility] Holter, DO      ondansetron  4 mg Oral Q6H PRN HOLLI Lion      pantoprazole  20 mg Oral [facility] Holter, DO      polyethylene glycol  17 g Oral Daily PRN HOLLI Ghotra      propranolol  10 mg Oral Q12H KAYLIE HOLLI Lion       senna-docusate sodium  1 tablet Oral Daily PRN Ion Dupontjorden       senna-docusate sodium  2 tablet Oral HS Melvina De Jesus DO      traZODone  50 mg Oral HS PRN HOLLI Lion      white petrolatum-mineral oil   Topical TID PRN HOLLI Lion         Risks / Benefits of Treatment:    Risks, benefits, and possible side effects of medications explained to patient and patient verbalizes understanding and agreement for treatment.    Subjective:    Behavior over the last 24 hours: unchanged.     Alberto was seen today in follow-up for continuation of care. Per staff, has been seclusive to room and depressed appearing. This morning, attempts were made to obtain lab work however were unsuccessful. Will need to re-try later today. Alberto states he has been feeling increasingly more depressed, unmotivated with low energy. Mostly spending long periods of time in bed with limited interactions in the milieu.  He reports to passive suicidal thoughts but feels safe in on the unit. Alberto presently is contacting for safety on the unit and feels comfortable to confide in staff if thoughts arise. At time of interview, he continues to endorse chronic AH of voices telling him they are going to kill his family.  Alberto has been complaint with medications and tolerating without any side effects.       Sleep: normal  Appetite:  off and on  Medication side effects: Yes - some sedation    ROS: no complaints    Mental Status Evaluation:    Appearance:  age appropriate, casually dressed, marginal hygiene, laying in bed   Behavior:  guarded, superficially cooperative, minimally engaged   Speech:  scant, soft   Mood:  depressed   Affect:  blunted   Thought Process:  goal directed, linear   Associations: concrete associations   Thought Content:  negative thinking   Perceptual Disturbances: auditory hallucinations   Risk Potential: Suicidal ideation - Yes, without plan  Homicidal ideation - None at present  Potential for  aggression - No   Sensorium:  oriented to person, place, and time/date   Memory:  recent and remote memory grossly intact   Consciousness:  alert and awake   Attention/Concentration: attention span and concentration appear shorter than expected for age   Insight:  limited   Judgment: limited   Gait/Station: in bed   Motor Activity: no abnormal movements     Vital signs in last 24 hours:    Temp:  [97.6 °F (36.4 °C)-97.7 °F (36.5 °C)] 97.6 °F (36.4 °C)  HR:  [79-87] 87  BP: ()/(53-89) 114/85  Resp:  [16-18] 16  SpO2:  [97 %-100 %] 100 %  O2 Device: None (Room air)    Laboratory results: I have personally reviewed all pertinent laboratory/tests results    Results from the past 24 hours: No results found for this or any previous visit (from the past 24 hour(s)).  Most Recent Labs:   Lab Results   Component Value Date    WBC 10.28 (H) 10/08/2024    RBC 5.65 (H) 10/08/2024    HGB 13.6 10/08/2024    HCT 42.1 10/08/2024     10/08/2024    RDW 14.3 10/08/2024    NEUTROABS 5.38 10/08/2024    SODIUM 139 10/01/2024    K 3.7 10/01/2024     10/01/2024    CO2 28 10/01/2024    BUN 10 10/01/2024    CREATININE 0.98 10/01/2024    GLUC 111 10/01/2024    CALCIUM 10.3 (H) 10/01/2024    AST 26 09/30/2024    ALT 42 09/30/2024    ALKPHOS 64 09/30/2024    TP 6.6 09/30/2024    ALB 3.8 09/30/2024    TBILI 0.47 09/30/2024    CHOLESTEROL 156 03/14/2024    HDL 44 03/14/2024    TRIG 133 03/14/2024    LDLCALC 85 03/14/2024    NONHDLC 112 03/14/2024    LITHIUM 0.61 01/09/2024    SUH4YGPTXCDY 1.062 11/15/2023    SYPHILISAB Non-reactive 11/18/2023    HGBA1C 5.0 04/01/2024    EAG 97 04/01/2024       Suicide/Homicide Risk Assessment:    Risk of Harm to Self:   Nursing Suicide Risk Assessment Last 24 hours: C-SSRS Risk (Since Last Contact)  Calculated C-SSRS Risk Score (Since Last Contact): No Risk Indicated    Risk of Harm to Others:  Nursing Homicide Risk Assessment: Violence Risk to Others: Denies within past 6 months    The  following interventions are recommended: Behavioral Health checks for safety monitoring, continued hospitalization on locked unit    Progress Toward Goals: progressing    Counseling / Coordination of Care:    Total floor / unit time spent today 36 minutes. Greater than 50% of total time was spent with the patient and / or family counseling and / or coordination of care. A description of counseling / coordination of care:    Verónica Jo PA-C 10/15/24

## 2024-10-16 LAB
ANION GAP SERPL CALCULATED.3IONS-SCNC: 9 MMOL/L (ref 4–13)
BASOPHILS # BLD AUTO: 0.06 THOUSANDS/ÂΜL (ref 0–0.1)
BASOPHILS NFR BLD AUTO: 1 % (ref 0–1)
BNP SERPL-MCNC: 12 PG/ML (ref 0–100)
BUN SERPL-MCNC: 9 MG/DL (ref 5–25)
CALCIUM SERPL-MCNC: 9.5 MG/DL (ref 8.4–10.2)
CHLORIDE SERPL-SCNC: 102 MMOL/L (ref 96–108)
CK SERPL-CCNC: 131 U/L (ref 39–308)
CLOZAPINE SERPL-MCNC: 267 NG/ML (ref 350–900)
CO2 SERPL-SCNC: 29 MMOL/L (ref 21–32)
CREAT SERPL-MCNC: 0.91 MG/DL (ref 0.6–1.3)
CRP SERPL QL: 8 MG/L
EOSINOPHIL # BLD AUTO: 0.4 THOUSAND/ÂΜL (ref 0–0.61)
EOSINOPHIL NFR BLD AUTO: 5 % (ref 0–6)
ERYTHROCYTE [DISTWIDTH] IN BLOOD BY AUTOMATED COUNT: 14.3 % (ref 11.6–15.1)
GFR SERPL CREATININE-BSD FRML MDRD: 115 ML/MIN/1.73SQ M
GLUCOSE P FAST SERPL-MCNC: 94 MG/DL (ref 65–99)
GLUCOSE SERPL-MCNC: 94 MG/DL (ref 65–140)
HCT VFR BLD AUTO: 42.5 % (ref 36.5–49.3)
HGB BLD-MCNC: 13.5 G/DL (ref 12–17)
IMM GRANULOCYTES # BLD AUTO: 0.01 THOUSAND/UL (ref 0–0.2)
IMM GRANULOCYTES NFR BLD AUTO: 0 % (ref 0–2)
LYMPHOCYTES # BLD AUTO: 3 THOUSANDS/ÂΜL (ref 0.6–4.47)
LYMPHOCYTES NFR BLD AUTO: 38 % (ref 14–44)
MCH RBC QN AUTO: 23.9 PG (ref 26.8–34.3)
MCHC RBC AUTO-ENTMCNC: 31.8 G/DL (ref 31.4–37.4)
MCV RBC AUTO: 75 FL (ref 82–98)
MONOCYTES # BLD AUTO: 0.71 THOUSAND/ÂΜL (ref 0.17–1.22)
MONOCYTES NFR BLD AUTO: 9 % (ref 4–12)
NEUTROPHILS # BLD AUTO: 3.83 THOUSANDS/ÂΜL (ref 1.85–7.62)
NEUTS SEG NFR BLD AUTO: 47 % (ref 43–75)
NRBC BLD AUTO-RTO: 0 /100 WBCS
PLATELET # BLD AUTO: 274 THOUSANDS/UL (ref 149–390)
PMV BLD AUTO: 10.4 FL (ref 8.9–12.7)
POTASSIUM SERPL-SCNC: 3.8 MMOL/L (ref 3.5–5.3)
RBC # BLD AUTO: 5.66 MILLION/UL (ref 3.88–5.62)
SODIUM SERPL-SCNC: 140 MMOL/L (ref 135–147)
WBC # BLD AUTO: 8.01 THOUSAND/UL (ref 4.31–10.16)

## 2024-10-16 PROCEDURE — 82553 CREATINE MB FRACTION: CPT

## 2024-10-16 PROCEDURE — 86140 C-REACTIVE PROTEIN: CPT

## 2024-10-16 PROCEDURE — 85025 COMPLETE CBC W/AUTO DIFF WBC: CPT

## 2024-10-16 PROCEDURE — 82550 ASSAY OF CK (CPK): CPT

## 2024-10-16 PROCEDURE — 80159 DRUG ASSAY CLOZAPINE: CPT | Performed by: PSYCHIATRY & NEUROLOGY

## 2024-10-16 PROCEDURE — 82550 ASSAY OF CK (CPK): CPT | Performed by: PSYCHIATRY & NEUROLOGY

## 2024-10-16 PROCEDURE — 83880 ASSAY OF NATRIURETIC PEPTIDE: CPT

## 2024-10-16 PROCEDURE — 80048 BASIC METABOLIC PNL TOTAL CA: CPT | Performed by: PSYCHIATRY & NEUROLOGY

## 2024-10-16 RX ADMIN — SENNOSIDES AND DOCUSATE SODIUM 2 TABLET: 8.6; 5 TABLET ORAL at 21:01

## 2024-10-16 RX ADMIN — NICOTINE POLACRILEX 2 MG: 2 GUM, CHEWING ORAL at 08:56

## 2024-10-16 RX ADMIN — CLOZAPINE 225 MG: 25 TABLET ORAL at 21:02

## 2024-10-16 RX ADMIN — NICOTINE POLACRILEX 2 MG: 2 GUM, CHEWING ORAL at 17:36

## 2024-10-16 RX ADMIN — MULTIPLE VITAMINS W/ MINERALS TAB 1 TABLET: TAB ORAL at 08:55

## 2024-10-16 RX ADMIN — PROPRANOLOL HYDROCHLORIDE 10 MG: 10 TABLET ORAL at 21:01

## 2024-10-16 RX ADMIN — ESCITALOPRAM OXALATE 20 MG: 10 TABLET, FILM COATED ORAL at 08:55

## 2024-10-16 RX ADMIN — GLYCOPYRROLATE 2 MG: 1 TABLET ORAL at 21:01

## 2024-10-16 RX ADMIN — PANTOPRAZOLE SODIUM 20 MG: 20 TABLET, DELAYED RELEASE ORAL at 08:56

## 2024-10-16 RX ADMIN — NICOTINE POLACRILEX 2 MG: 2 GUM, CHEWING ORAL at 21:36

## 2024-10-16 RX ADMIN — GLYCOPYRROLATE 1 MG: 1 TABLET ORAL at 08:55

## 2024-10-16 RX ADMIN — NICOTINE POLACRILEX 2 MG: 2 GUM, CHEWING ORAL at 12:53

## 2024-10-16 RX ADMIN — ATROPINE SULFATE 1 DROP: 10 SOLUTION/ DROPS OPHTHALMIC at 21:17

## 2024-10-16 RX ADMIN — PROPRANOLOL HYDROCHLORIDE 10 MG: 10 TABLET ORAL at 08:55

## 2024-10-16 RX ADMIN — GLYCOPYRROLATE 1 MG: 1 TABLET ORAL at 13:00

## 2024-10-16 RX ADMIN — LORATADINE 10 MG: 10 TABLET ORAL at 08:56

## 2024-10-16 RX ADMIN — ARIPIPRAZOLE 30 MG: 15 TABLET ORAL at 08:51

## 2024-10-16 RX ADMIN — MELATONIN TAB 3 MG 9 MG: 3 TAB at 21:01

## 2024-10-16 RX ADMIN — HYDROCHLOROTHIAZIDE 12.5 MG: 12.5 TABLET ORAL at 08:55

## 2024-10-16 NOTE — ASSESSMENT & PLAN NOTE
Reviewed on 10/16/2024    Continue senna-docusate sodium two tablets before bed per medical team    No associated orders from this encounter found during lookback period of 72 hours.

## 2024-10-16 NOTE — PROGRESS NOTES
"Progress Note - Behavioral Health   Name: Alberto Berumen 28 y.o. male I MRN: [id number]  Unit/Bed#: [facility] 108-02 I Date of Admission: 3/29/2024   Date of Service: 10/16/2024 I Hospital Day: 201     Assessment & Plan  Schizoaffective disorder, bipolar type (HCC)  Reviewed on 10/16/2024    Patient remains with ongoing depressive symptoms and ongoing auditory hallucinations of \"loud, angry, irritating voices.\" Remains with paranoia surrounding these voices and fears that someone is out to harm him.    Increase Clozapine to 225 mg at bedtime for psychosis  Continue to obtain CBC (weekly), BNP (every 2 weeks), CK (weekly) for ongoing clozapine monitoring    Continue Abilify 30 mg daily for psychosis  Continue escitalopram 20 mg tablet daily for depression and anxiety  Continue senna-docusate sodium two 50 mg tablets daily before bed for constipation    Continue propanolol 10 mg twice daily for anxiety     No associated orders from this encounter found during lookback period of 72 hours.    Chronic idiopathic constipation  Reviewed on 10/16/2024    Continue senna-docusate sodium two tablets before bed per medical team    No associated orders from this encounter found during lookback period of 72 hours.  GERD (gastroesophageal reflux disease)  Reviewed on 10/16/2024    Continue famotidine 20 mg tablet BID per medical team  Continue glycopyrrolate 1 mg tablet BID AM and afternoon per medical team  Continue glycopyrrolate 2 mg tablet before bed per medical team  Continue pantoprazole EC 20 mg tablet every morning per medical team  Medical clearance for psychiatric admission  Reviewed on 10/16/2024    Ongoing by medical  Tobacco abuse  Reviewed on 10/16/2024    Continue to encourage cessation upon discharge  Elevated hemoglobin A1c  Reviewed on 10/16/2024    Continue metformin 500 mg tablet BID with meals per medical team    Plan:  Continue prescribed psychotropic medication regimen  Currently on 201 commitment " "status  Continue to promote patient participation in therapeutic milieu.  Continue medical management per medicine.  Continue behavioral health checks q.15 minutes.   Continue vitals per behavioral health unit protocol.  Safety: Safety and communication plan established to target dynamic risk factors discussed above.   Discharge and disposition: At this time plans are being made to discharge patient to his aunt's following clinical stabilization.  At this time patient is awaiting [facility] [place] for ACT services    SUBJECTIVE     Patient evaluated this a.m. for continuity of care. Patient was discussed at length with nursing and treatment team. Per nursing, on the inpatient psychiatric unit Alberto remains with ongoing depressive symptoms and ongoing auditory hallucinations.  He remains with paranoia surrounding these voices.  Yesterday afternoon he continued to endorse ongoing auditory hallucinations that are derogatory in nature.  On afternoon nursing assessment he denied suicidal ideation, homicidal ideation, visual hallucinations.  Per nursing report he continues to have decreased appetite and he did not eat breakfast.  At around 5:00 PM he was noted to become increasingly agitated and was given as needed oral Haldol 5 mg, which was effective.  On the inpatient unit he has been medication and meal compliant.    During evaluation, no acute distress is noted. Alberto Berumen reports feeling \"the same.\"     At the time of interview, Alberto reports he continues to struggle with ongoing psychosis.  He continues to endorse ongoing derogatory auditory hallucinations that are persecutory in nature.  He states that the voices are \"the same\" today.  He denies command auditory hallucinations.  He denies visual hallucinations.    In the context of ongoing psychosis, Alberto reports that he continues to remain with symptoms of depression and anxiety.  He reports some mild improvements in his depression today and at the time " of interview states he does not have any suicidal ideations.    At the time of interview, Alberto reports that he has been finding himself less active in the mornings and he has not been going for a morning walk, a pass time that he was previously participating in consistently.    At the time of interview Alberto reports that h he would like to see his family and he is asking if arrangements can be made for his mother and his sister to visit him.    At this time, Alberto reports that he has been sleeping well.  He reports that his appetite continues to remain poor and largely unchanged since yesterday.  He has been taking his medications as directed and he denies medication side effects.  He reports that he has been going to the bathroom without difficulty.      At the time of interview Alberto denies suicidal ideation and homicidal ideation.  He remains with auditory hallucinations.  He denies visual hallucinations.    PSYCHIATRIC REVIEW OF SYSTEMS     Behavior over the last 24 hours:  unchanged  Sleep: normal  Appetite: poor  Medication side effects: No    Progress Toward Goals: Slow progress, as evidenced by their participation (or lack thereof) in individual, social and therapeutic milieu in addition to adherence to their medication regimen.  Patient Acceptance: Fair  Patient Understanding: Limited  Patient Involvement in Reintegration Process: Patient remains in contact with his family on the phone (including his aunt)  Patient's Therapeutic Relationship with Psychiatrist: Trusting  Patient's Optimism Regarding Recovery: Patient is hopeful to discharge to his aunt's house following coordination of ACT services  Group Attendance: Patient attended 6 out of 12 groups yesterday    REVIEW OF SYSTEMS   Review of systems: no complaints    OBJECTIVE     Vital Signs in Past 24 Hours:  Temp:  [97.8 °F (36.6 °C)] 97.8 °F (36.6 °C)  HR:  [100-104] 100  BP: (126-142)/(86-87) 126/87  Resp:  [18] 18  SpO2:  [98 %] 98  %    Intake/Output in Past 24 hours:  I/O last 3 completed shifts:  In: 240 [P.O.:240]  Out: -   No intake/output data recorded.        Laboratory Results:  I have personally reviewed all pertinent laboratory/tests results.  Most Recent Labs:   Lab Results   Component Value Date    WBC 8.01 10/16/2024    RBC 5.66 (H) 10/16/2024    HGB 13.5 10/16/2024    HCT 42.5 10/16/2024     10/16/2024    RDW 14.3 10/16/2024    NEUTROABS 3.83 10/16/2024    SODIUM 139 10/01/2024    K 3.7 10/01/2024     10/01/2024    CO2 28 10/01/2024    BUN 10 10/01/2024    CREATININE 0.98 10/01/2024    GLUC 111 10/01/2024    GLUF 83 09/30/2024    CALCIUM 10.3 (H) 10/01/2024    AST 26 09/30/2024    ALT 42 09/30/2024    ALKPHOS 64 09/30/2024    TP 6.6 09/30/2024    ALB 3.8 09/30/2024    TBILI 0.47 09/30/2024    CHOLESTEROL 156 03/14/2024    HDL 44 03/14/2024    TRIG 133 03/14/2024    LDLCALC 85 03/14/2024    NONHDLC 112 03/14/2024    LITHIUM 0.61 01/09/2024    [id number] 1.062 11/15/2023    HGBA1C 5.0 04/01/2024    EAG 97 04/01/2024       Behavioral Health Medications: all current active meds have been reviewed and continue current psychiatric medications.    Current Facility-Administered Medications   Medication Dose Route Frequency Provider Last Rate    acetaminophen  650 mg Oral Q4H PRN Jordan C Holter, DO      acetaminophen  650 mg Oral Q6H PRN HOLLI Lion      aluminum-magnesium hydroxide-simethicone  30 mL Oral Q4H PRN Jordan C Holter, DO      amLODIPine  5 mg Oral Daily HOLLI Lion      ARIPiprazole  30 mg Oral Daily Bora Rosario MD      Artificial Tears  1 drop Both Eyes Q3H PRN Jordan C Holter, DO      atropine  1 drop Sublingual  Harjeet Torres DO      haloperidol lactate  2.5 mg Intramuscular Q4H PRN Max 4/day HOLLI Lion      And    LORazepam  1 mg Intramuscular Q4H PRN Max 4/day HOLLI Lion      And    benztropine  0.5 mg Intramuscular Q4H PRN Max 4/day HOLLI Lion      benztropine   1 mg Intramuscular Q4H PRN Max 6/day Ion  Holter, DO      haloperidol lactate  5 mg Intramuscular Q4H PRN Max 4/day HOLLI Lion      And    LORazepam  2 mg Intramuscular Q4H PRN Max 4/day HOLLI Lion      And    benztropine  1 mg Intramuscular Q4H PRN Max 4/day HOLLI Lion      benztropine  1 mg Oral Q4H PRN Max 6/day HOLLI Lion      benztropine  1 mg Oral Q4H PRN Max 6/day [facility] Holter, DO      bisacodyl  10 mg Rectal Daily PRN HOLLI Lion      calcium carbonate  500 mg Oral BID PRN HOLLI Monge      cloZAPine  200 mg Oral HS Bora Rosario MD      hydrOXYzine HCL  50 mg Oral Q6H PRN Max 4/day [facility] Holter, DO      Or    diphenhydrAMINE  50 mg Intramuscular Q6H PRN [facility] Cresencioter, DO      hydrOXYzine HCL  50 mg Oral Q6H PRN Max 4/day HOLLI Lion      Or    diphenhydrAMINE  50 mg Intramuscular Q6H PRN HOLLI Lion      diphenhydrAMINE-zinc acetate   Topical BID PRN HOLLI Lion      escitalopram  20 mg Oral Daily HOLLI Lion      famotidine  20 mg Oral BID PRN HOLLI Monge      fluticasone  1 spray Each Nare Daily PRN HOLLI Monge      glycopyrrolate  1 mg Oral BID (AM & Afternoon) Bora Rosario MD      glycopyrrolate  2 mg Oral HS Bora Rosario MD      haloperidol  1 mg Oral Q6H PRN HOLLI Lion      haloperidol  2.5 mg Oral Q4H PRN Max 4/day HOLLI Lion      haloperidol  5 mg Oral Q4H PRN Max 4/day HOLLI Lion      hydroCHLOROthiazide  12.5 mg Oral Daily HOLLI Galvan      hydrocortisone   Topical 4x Daily PRN HOLLI Lion      hydrOXYzine HCL  100 mg Oral Q6H PRN Max 4/day [facility] Holter, DO      Or    LORazepam  2 mg Intramuscular Q6H PRN [facility] Holter, DO      hydrOXYzine HCL  100 mg Oral Q6H PRN Max 4/day HOLLI Lion      Or    LORazepam  2 mg Intramuscular Q6H PRN HOLLI Lion      hydrOXYzine HCL  25 mg Oral Q6H PRN Max 4/day Ion FISCHER  Holter, DO      ibuprofen  600 mg Oral Q8H PRN HOLLI Lion      influenza vaccine  0.5 mL Intramuscular Once Bora Rosario MD      loratadine  10 mg Oral Daily Brina Guillen MD      melatonin  3 mg Oral HS PRN Bora Rosario MD      melatonin  9 mg Oral HS Bora Rosario MD      methocarbamol  500 mg Oral Q6H PRN HOLLI Lion      multivitamin-minerals  1 tablet Oral Daily Eileen Gonzalezmiryamjaylen, HOLLI      nicotine polacrilex  2 mg Oral Q4H PRN Bora Rosario MD      OLANZapine  5 mg Oral Q4H PRN Max 3/day [facility] Holter, DO      Or    OLANZapine  2.5 mg Intramuscular Q4H PRN Max 3/day [facility] Holter, DO      OLANZapine  5 mg Oral Q3H PRN Max 3/day [facility] Holter, DO      Or    OLANZapine  5 mg Intramuscular Q3H PRN Max 3/day [facility] Holter, DO      OLANZapine  2.5 mg Oral Q4H PRN Max 6/day [facility] Holter, DO      ondansetron  4 mg Oral Q6H PRN HOLLI Lion      [facility]oprazole  [facility]      polyethylene glycol  17 g Oral Daily PRN Sofi Yoo, HOLLI      propranolol  10 mg Oral Q12H KAYLIE HOLLI Lion      senna-docusate sodium  1 tablet Oral Daily PRN [facility] Holter, DO      senna-docusate sodium  2 tablet Oral HS Melvina Ambron, DO      traZODone  50 mg Oral HS PRN HOLLI Lion      white petrolatum-mineral oil   Topical TID PRN HOLLI Lion         Risks, benefits and possible side effects of Medications:   Risks, benefits, and possible side effects of medications explained to patient and patient verbalizes understanding.      Dual Antipsychotic Rationale: At this time the patient is on dual antipsychotic treatments due to inability to control symptoms on a singular antipsychotic agent and multiple failed past antipsychotic trials    Mental Status Evaluation:  Appearance:  Age appropriate, casually dressed, overweight, marginal grooming hygiene, limited eye contact, no acute distress   Behavior:  Polite, limited interview cooperation   Speech:  Soft,  "normal rate, clear, coherent   Mood:  \"The same\"   Affect:  Blunted, depressed   Thought Process:  Harrison, linear, goal directed   Associations: Harrison associations   Thought Content:  Remains with paranoia in the context of ongoing struggles with auditory hallucinations   Perceptual Disturbances: Continues to report ongoing auditory hallucinations that make derogatory comments that they are going to kill him.  He denies command auditory hallucinations.  He denies visual hallucinations.  He continues to appear internally preoccupied.   Risk Potential: Suicidal ideation - None  Homicidal ideation - None  Potential for aggression - No   Sensorium:  oriented to person, place, and time/date   Memory:  recent and remote memory grossly intact   Consciousness:  alert and awake   Attention/Concentration: attention span and concentration are age appropriate   Insight:  limited   Judgment: limited   Gait/Station: in bed   Motor Activity: no abnormal movements     Recommended Treatment:   See above for assessment and plan.    Inpatient Psychiatric Certification:  Patient is not at goal. They are not yet ready for discharge. The patient's condition currently requires active psychopharmacological medication management, interdisciplinary coordination with case management, and the utilization of adjunctive milieu and group therapy to augment psychopharmacological efficacy. The patient's risk of morbidity, and progression or decompensation of psychiatric disease, is higher without this current treatment. Based upon physical, mental and social evaluations, I certify that inpatient psychiatric services are medically necessary for this patient for a duration of >2 midnights for the treatment of Schizoaffective disorder, bipolar type (HCC)  Available alternative community resources do not meet the patient's mental health care needs.  I further attest that an established written individualized plan of care has been implemented and " is outlined in the patient's medical records.    Counseling/Coordination of Care    Total unit time spent today was greater than 15 minutes. Greater than 50% of total time was spent with the patient and/or patient's relatives and/or coordination of patient's care.     Patient's rights, confidentiality, exceptions to confidentiality, use of electronic medical record including appropriate staff access to medical record regarding behavioral health services and consent to treatment were reviewed.    Note Share:     This note was not shared with the patient due to reasonable likelihood of causing patient harm     Hardik So MD   Psychiatry, PGY-4

## 2024-10-16 NOTE — PROGRESS NOTES
10/16/24 0956   Team Meeting   Meeting Type Tx Team Meeting   Initial Conference Date 10/16/24   Next Conference Date 11/16/24   Team Members Present   Team Members Present Physician;Nurse;   Physician Team Member Judah   Nursing Team Member Claudia   Social Work Team Member Jose J ORTEGA   Patient/Family Present   Patient Present Yes   Patient's Family Present No     Treatment team reviewed patient's 30 day treatment plan with patient. Patient signed his treatment plan.

## 2024-10-16 NOTE — PROGRESS NOTES
10/16/24 0809   Team Meeting   Meeting Type Daily Rounds   Team Members Present   Team Members Present Physician;Nurse;;Other (Discipline and Name)   Physician Team Member Giovanny So PA-C    Nursing Team Member Claudia   Social Work Team Member Jose J ORTEGA   Patient/Family Present   Patient Present No   Patient's Family Present No     Groups Participation  6/12.   Patient's compliant with medications. He's engaged in his treatment. Currently does not have medicaid services and awaiting [facility] funding for ACT services. Will discharge home once ACT services are established. Reports depression and agitation due to the voices.

## 2024-10-16 NOTE — NURSING NOTE
Pt is visible in the milieu and social with select peers. He consumed 50 % of dinner and Ensure. Took medications without incidence. Pt endorses AH of the same voices that are trying to hurt him and his family. Pt states that he is doing better than yesterday. Attended Exercise, Coping skills, Nursing, and Wrap up groups. No unmet needs. No behavioral issues.

## 2024-10-16 NOTE — PLAN OF CARE
Problem: Alteration in Thoughts and Perception  Goal: Refrain from acting on delusional thinking/internal stimuli  Description: Interventions:  - Monitor patient closely, per order   - Utilize least restrictive measures   - Set reasonable limits, give positive feedback for acceptable   - Administer medications as ordered and monitor of potential side effects  Outcome: Progressing  Goal: Agree to be compliant with medication regime, as prescribed and report medication side effects  Description: Interventions:  - Offer appropriate PRN medication and supervise ingestion; conduct AIMS, as needed   Outcome: Progressing  Goal: Complete daily ADLs, including personal hygiene independently, as able  Description: Interventions:  - Observe, teach, and assist patient with ADLS  - Monitor and promote a balance of rest/activity, with adequate nutrition and elimination   Outcome: Progressing     Problem: Ineffective Coping  Goal: Identifies ineffective coping skills  Outcome: Progressing     Problem: Depression  Goal: Refrain from harming self  Description: Interventions:  - Monitor patient closely, per order   - Supervise medication ingestion, monitor effects and side effects   Outcome: Progressing  Goal: Refrain from isolation  Description: Interventions:  - Develop a trusting relationship   - Encourage socialization   Outcome: Progressing  Goal: Refrain from self-neglect  Outcome: Progressing     Problem: Anxiety  Goal: Anxiety is at manageable level  Description: Interventions:  - Assess and monitor patient's anxiety level.   - Monitor for signs and symptoms (heart palpitations, chest pain, shortness of breath, headaches, nausea, feeling jumpy, restlessness, irritable, apprehensive).   - Collaborate with interdisciplinary team and initiate plan and interventions as ordered.  - [place] patient to unit/surroundings  - Explain treatment plan  - Encourage participation in care  - Encourage verbalization of concerns/fears  -  Identify coping mechanisms  - Assist in developing anxiety-reducing skills  - Administer/offer alternative therapies  - Limit or eliminate stimulants  Outcome: Progressing

## 2024-10-16 NOTE — ASSESSMENT & PLAN NOTE
"Reviewed on 10/16/2024    Patient remains with ongoing depressive symptoms and ongoing auditory hallucinations of \"loud, angry, irritating voices.\" Remains with paranoia surrounding these voices and fears that someone is out to harm him.    Increase Clozapine to 225 mg at bedtime for psychosis  Continue to obtain CBC (weekly), BNP (every 2 weeks), CK (weekly) for ongoing clozapine monitoring    Continue Abilify 30 mg daily for psychosis  Continue escitalopram 20 mg tablet daily for depression and anxiety  Continue senna-docusate sodium two 50 mg tablets daily before bed for constipation    Continue propanolol 10 mg twice daily for anxiety     No associated orders from this encounter found during lookback period of 72 hours.    "

## 2024-10-16 NOTE — PROGRESS NOTES
10/16/24 1011   Team Meeting   Meeting Type Tx Team Meeting   Initial Conference Date 10/16/24   Next Conference Date 10/28/24   Team Members Present   Team Members Present Physician;Nurse;;Other (Discipline and Name)   Physician Team Member Jduah   Nursing Team Member Claudia   Social Work Team Member Jose J ORTEGA   Other (Discipline and Name) Gaby CMP MHDS, Gary CMP MHDS   Patient/Family Present   Patient Present Yes   Patient's Family Present No   OTHER   Team Meeting - Additional Comments Patient was excused from his tRteatment team meeting as he had complications with lab draw. This writer discussed with [facility] we are awaiting his [facility] funding for ACT services as the patient's MA was discontinued due to exceeding financial paramaters to qualify for MA. This writer discussed patient's sister Tere was provided the information for [employer]EN[employer] montanez writer will f/u w/her during her in person visist with the patient. [facility] reports he's currently on the wait list for [facility] funding, however this is an extensive wait list and he could wait for months before he's approved for funding. This writer reiterarted that patient is ready for discharge however his sister request he's discharged with ACT services to support her borther's recovery.

## 2024-10-16 NOTE — NURSING NOTE
Pt in bed asleep, observed eyes closed, and chest movement noted. Continuous safety checks maintained. No unmet needs at this time. Will continue to monitor patient needs, sleep pattern, and behaviors.     0611: Patient has slept all night, 7+ hours of uninterrupted sleep

## 2024-10-16 NOTE — ASSESSMENT & PLAN NOTE
Reviewed on 10/16/2024    Continue famotidine 20 mg tablet BID per medical team  Continue glycopyrrolate 1 mg tablet BID AM and afternoon per medical team  Continue glycopyrrolate 2 mg tablet before bed per medical team  Continue pantoprazole EC 20 mg tablet every morning per medical team

## 2024-10-16 NOTE — NURSING NOTE
"Pt is calm, cooperative and visible on unit. Pt reports AH, depression and anxiety; denies SI/HI/VH. Pt states, \"I'm forcing myself to be happy.\" Support provided. Compliant with medications. Appetite remains poor. Q 15 min checks maintained.   "

## 2024-10-17 PROCEDURE — 99232 SBSQ HOSP IP/OBS MODERATE 35: CPT | Performed by: PSYCHIATRY & NEUROLOGY

## 2024-10-17 RX ADMIN — NICOTINE POLACRILEX 2 MG: 2 GUM, CHEWING ORAL at 21:57

## 2024-10-17 RX ADMIN — MELATONIN TAB 3 MG 9 MG: 3 TAB at 21:45

## 2024-10-17 RX ADMIN — NICOTINE POLACRILEX 2 MG: 2 GUM, CHEWING ORAL at 12:52

## 2024-10-17 RX ADMIN — NICOTINE POLACRILEX 2 MG: 2 GUM, CHEWING ORAL at 17:30

## 2024-10-17 RX ADMIN — GLYCOPYRROLATE 2 MG: 1 TABLET ORAL at 21:46

## 2024-10-17 RX ADMIN — HYDROCHLOROTHIAZIDE 12.5 MG: 12.5 TABLET ORAL at 08:27

## 2024-10-17 RX ADMIN — ESCITALOPRAM OXALATE 20 MG: 10 TABLET, FILM COATED ORAL at 08:27

## 2024-10-17 RX ADMIN — ATROPINE SULFATE 1 DROP: 10 SOLUTION/ DROPS OPHTHALMIC at 21:57

## 2024-10-17 RX ADMIN — PROPRANOLOL HYDROCHLORIDE 10 MG: 10 TABLET ORAL at 08:27

## 2024-10-17 RX ADMIN — CLOZAPINE 225 MG: 25 TABLET ORAL at 21:46

## 2024-10-17 RX ADMIN — PROPRANOLOL HYDROCHLORIDE 10 MG: 10 TABLET ORAL at 21:46

## 2024-10-17 RX ADMIN — AMLODIPINE BESYLATE 5 MG: 5 TABLET ORAL at 08:27

## 2024-10-17 RX ADMIN — LORATADINE 10 MG: 10 TABLET ORAL at 08:27

## 2024-10-17 RX ADMIN — ARIPIPRAZOLE 30 MG: 15 TABLET ORAL at 09:08

## 2024-10-17 RX ADMIN — SENNOSIDES AND DOCUSATE SODIUM 2 TABLET: 8.6; 5 TABLET ORAL at 21:46

## 2024-10-17 RX ADMIN — GLYCOPYRROLATE 1 MG: 1 TABLET ORAL at 08:27

## 2024-10-17 RX ADMIN — NICOTINE POLACRILEX 2 MG: 2 GUM, CHEWING ORAL at 08:27

## 2024-10-17 RX ADMIN — GLYCOPYRROLATE 1 MG: 1 TABLET ORAL at 14:10

## 2024-10-17 RX ADMIN — PANTOPRAZOLE SODIUM 20 MG: 20 TABLET, DELAYED RELEASE ORAL at 08:27

## 2024-10-17 RX ADMIN — MULTIPLE VITAMINS W/ MINERALS TAB 1 TABLET: TAB ORAL at 08:27

## 2024-10-17 NOTE — PROGRESS NOTES
10/17/24 0813   Team Meeting   Meeting Type Daily Rounds   Team Members Present   Team Members Present Physician;Nurse;;Other (Discipline and Name)   Physician Team Member Holter, Thomas   Nursing Team Member Claudia   Social Work Team Member Jose J ORTEGA   Other (Discipline and Name) Kathleen PCM   Patient/Family Present   Patient Present No   Patient's Family Present No   OTHER   Team Meeting - Additional Comments Wang PCM     Groups Participation  6/11.   Patient's compliant with medications. He's engaged in some treatment. He reports depression and AH. Pending discharge home with ACT services. Labs completed. Clozaril increased to 225. His family will occur this weekend for his birthday.

## 2024-10-17 NOTE — PLAN OF CARE
Problem: Alteration in Thoughts and Perception  Goal: Verbalize thoughts and feelings  Description: Interventions:  - Promote a nonjudgmental and trusting relationship with the patient through active listening and therapeutic communication  - Assess patient's level of functioning, behavior and potential for risk  - Engage patient in 1 on 1 interactions  - Encourage patient to express fears, feelings, frustrations, and discuss symptoms    - [place] patient to reality, help patient recognize reality-based thinking   - Administer medications as ordered and assess for potential side effects  - Provide the patient education related to the signs and symptoms of the illness and desired effects of prescribed medications  Outcome: Progressing  Goal: Refrain from acting on delusional thinking/internal stimuli  Description: Interventions:  - Monitor patient closely, per order   - Utilize least restrictive measures   - Set reasonable limits, give positive feedback for acceptable   - Administer medications as ordered and monitor of potential side effects  Outcome: Progressing  Goal: Agree to be compliant with medication regime, as prescribed and report medication side effects  Description: Interventions:  - Offer appropriate PRN medication and supervise ingestion; conduct AIMS, as needed   Outcome: Progressing  Goal: Attend and participate in unit activities, including therapeutic, recreational, and educational groups  Description: Interventions:  -Encourage Visitation and family involvement in care  Outcome: Progressing     Problem: Ineffective Coping  Goal: Identifies ineffective coping skills  Outcome: Progressing  Goal: Identifies healthy coping skills  Outcome: Progressing  Goal: Demonstrates healthy coping skills  Outcome: Progressing     Problem: Depression  Goal: Treatment Goal: Demonstrate behavioral control of depressive symptoms, verbalize feelings of improved mood/affect, and adopt new coping skills prior to  discharge  Outcome: Progressing  Goal: Refrain from isolation  Description: Interventions:  - Develop a trusting relationship   - Encourage socialization   Outcome: Progressing     Problem: Anxiety  Goal: Anxiety is at manageable level  Description: Interventions:  - Assess and monitor patient's anxiety level.   - Monitor for signs and symptoms (heart palpitations, chest pain, shortness of breath, headaches, nausea, feeling jumpy, restlessness, irritable, apprehensive).   - Collaborate with interdisciplinary team and initiate plan and interventions as ordered.  - [place] patient to unit/surroundings  - Explain treatment plan  - Encourage participation in care  - Encourage verbalization of concerns/fears  - Identify coping mechanisms  - Assist in developing anxiety-reducing skills  - Administer/offer alternative therapies  - Limit or eliminate stimulants  Outcome: Progressing     Problem: Alteration in Orientation  Goal: Interact with staff daily  Description: Interventions:  - Assess and re-assess patient's level of orientation  - Engage patient in 1 on 1 interactions, daily, for a minimum of 15 minutes   - Establish rapport/trust with patient   Outcome: Progressing     Problem: DISCHARGE PLANNING - CARE MANAGEMENT  Goal: Discharge to post-acute care or home with appropriate resources  Description: INTERVENTIONS:  - Conduct assessment to determine patient/family and health care team treatment goals, and need for post-acute services based on payer coverage, community resources, and patient preferences, and barriers to discharge  - Address psychosocial, clinical, and financial barriers to discharge as identified in assessment in conjunction with the patient/family and health care team  - Arrange appropriate level of post-acute services according to patient's   needs and preference and payer coverage in collaboration with the physician and health care team  - Communicate with and update the patient/family, physician,  and health care team regarding progress on the discharge plan  - Arrange appropriate transportation to post-acute venues  Outcome: Progressing     Problem: Alteration in Thoughts and Perception  Goal: Treatment Goal: Gain control of psychotic behaviors/thinking, reduce/eliminate presenting symptoms and demonstrate improved reality functioning upon discharge  Outcome: Not Progressing

## 2024-10-17 NOTE — PROGRESS NOTES
"Progress Note - Behavioral Health   Name: Albetro Berumen 29 y.o. male I MRN: [id number]  Unit/Bed#: [facility] 108-02 I Date of Admission: 3/29/2024   Date of Service: 10/17/2024 I Hospital Day: 202     Assessment & Plan  Schizoaffective disorder, bipolar type (HCC)  Reviewed on 10/17/24    Patient remains with ongoing depressive symptoms and ongoing auditory hallucinations of \"loud, angry, irritating voices.\" Remains with paranoia surrounding these voices and fears that someone is out to harm him.    Continue Clozapine 225 mg at bedtime for psychosis  Continue to obtain CBC (weekly), BNP (every 2 weeks), CK (weekly) for ongoing clozapine monitoring    Continue Abilify 30 mg daily for psychosis  Continue escitalopram 20 mg tablet daily for depression and anxiety  Continue senna-docusate sodium two 50 mg tablets daily before bed for constipation    Continue propanolol 10 mg twice daily for anxiety     No associated orders from this encounter found during lookback period of 72 hours.    Chronic idiopathic constipation  Reviewed on 10/17/24    Continue senna-docusate sodium two tablets before bed per medical team    No associated orders from this encounter found during lookback period of 72 hours.  GERD (gastroesophageal reflux disease)  Reviewed on 10/17/24    Continue famotidine 20 mg tablet BID per medical team  Continue glycopyrrolate 1 mg tablet BID AM and afternoon per medical team  Continue glycopyrrolate 2 mg tablet before bed per medical team  Continue pantoprazole EC 20 mg tablet every morning per medical team  Medical clearance for psychiatric admission  Reviewed on 10/17/24    Ongoing by medical  Tobacco abuse  Reviewed on 10/17/24    Continue to encourage cessation upon discharge  Elevated hemoglobin A1c  Reviewed on 10/17/24    Continue metformin 500 mg tablet BID with meals per medical team    Plan:  Continue prescribed psychotropic medication regimen  Currently on 201 commitment status  Continue to promote " "patient participation in therapeutic milieu.  Continue medical management per medicine.  Continue behavioral health checks q.15 minutes.   Continue vitals per behavioral health unit protocol.  Safety: Safety and communication plan established to target dynamic risk factors discussed above.   Discharge and disposition: At this time plans are being made for patient for discharge to the patient's aunt's house following ACT services coordination. Patient is awaiting [facility] funding for ACT.    SUBJECTIVE     Patient evaluated this a.m. for continuity of care. Patient was discussed at length with nursing and treatment team. Per nursing, on the inpatient unit Alberto continues to make slow progress. On evening nursing assessment he reported that he had a better day overall. On evening nursing assessment he continued to endorse auditory hallucinations of the same voices that are trying to hurt him and his family. Per nursing report he remained calm and cooperative, medication and meal compliant. He attended 6 of 11 groups yesterday.    During evaluation, no acute distress is noted. Alberto Berumen reports feeling \"okay.\"    At this time, Alberto reports he continues to struggle with ongoing psychosis.  He continues to endorse ongoing derogatory auditory hallucinations that are persecutory in nature.  Unfortunately, he states the voices are \"the same\" today.  He denies command auditory hallucinations.  He denies visual hallucinations.    Alberto reports that, in the context of ongoing psychosis, he continues to remain with symptoms of depression and anxiety.  He continues to make slow improvements in his depression and at the time of interview states he does not have any suicidal ideations.    Unfortunately, Alberto reports he continues to struggle with morning fatigue.  He continues to remain isolative to his room in the mornings and he has been less engaged in activities (including morning walk) in the morning.    At the " time of interview Alberto reports that he is looking forward to seeing his family.  His aunt is coming to visit him on Saturday and his sister is coming to visit him on Monday.    Today is Alberto's birthday.  At the time of interview he was wished a happy birthday.  He reports no particular plans for the day.    At this time, Alberto denies suicidal ideation and homicidal ideation.  He denies visual hallucinations.  He remains with derogatory auditory hallucinations not command in nature.    PSYCHIATRIC REVIEW OF SYSTEMS     Behavior over the last 24 hours:  unchanged  Sleep: hypersomnia  Appetite: remains with decreased appetite, he drank 2 ensures and ate 50% of dinner yesterday  Medication side effects: fatigue    Progress Toward Goals: Slow progress, as evidenced by their participation in individual, social and therapeutic milieu in addition to adherence to their medication regimen.  Patient Acceptance: Fair  Patient Understanding: Limited  Patient Involvement in Reintegration Process: Patient remains in contact with his family on the phone (including his aunt)  Patient's Therapeutic Relationship with Psychiatrist: Trusting  Patient's Optimism Regarding Recovery: Patient is hopeful for discharge to his aunt's house following ACT services  Group Attendance: Patient attended 6 of 11 groups yesterday    REVIEW OF SYSTEMS   Review of systems: no complaints    OBJECTIVE     Vital Signs in Past 24 Hours:  Temp:  [97.2 °F (36.2 °C)-97.6 °F (36.4 °C)] 97.3 °F (36.3 °C)  HR:  [] 95  BP: (116-142)/(70-86) 142/86  Resp:  [16-18] 18  SpO2:  [97 %-99 %] 98 %  O2 Device: None (Room air)    Intake/Output in Past 24 hours:  No intake/output data recorded.  No intake/output data recorded.        Laboratory Results:  I have personally reviewed all pertinent laboratory/tests results.  Most Recent Labs:   Lab Results   Component Value Date    WBC 8.01 10/16/2024    RBC 5.66 (H) 10/16/2024    HGB 13.5 10/16/2024    HCT 42.5  10/16/2024     10/16/2024    RDW 14.3 10/16/2024    NEUTROABS 3.83 10/16/2024    SODIUM 140 10/16/2024    K 3.8 10/16/2024     10/16/2024    CO2 29 10/16/2024    BUN 9 10/16/2024    CREATININE 0.91 10/16/2024    GLUC 94 10/16/2024    GLUF 94 10/16/2024    CALCIUM 9.5 10/16/2024    AST 26 09/30/2024    ALT 42 09/30/2024    ALKPHOS 64 09/30/2024    TP 6.6 09/30/2024    ALB 3.8 09/30/2024    TBILI 0.47 09/30/2024    CHOLESTEROL 156 03/14/2024    HDL 44 03/14/2024    TRIG 133 03/14/2024    LDLCALC 85 03/14/2024    NONHDLC 112 03/14/2024    LITHIUM 0.61 01/09/2024    USA4OBLIUKJW 1.062 11/15/2023    HGBA1C 5.0 04/01/2024    EAG 97 04/01/2024       Behavioral Health Medications: all current active meds have been reviewed and continue current psychiatric medications.    Current Facility-Administered Medications   Medication Dose Route Frequency Provider Last Rate    acetaminophen  650 mg Oral Q4H PRN Jordan C Holter, DO      acetaminophen  650 mg Oral Q6H PRN HOLLI Lion      aluminum-magnesium hydroxide-simethicone  30 mL Oral Q4H PRN Jordan C Holter, DO      amLODIPine  5 mg Oral Daily HOLLI Lion      ARIPiprazole  30 mg Oral Daily Bora Rosario MD      Artificial Tears  1 drop Both Eyes Q3H PRN Jordan C Holter, DO      atropine  1 drop Sublingual HS Harjeet Torres,       haloperidol lactate  2.5 mg Intramuscular Q4H PRN Max 4/day HOLLI Lion      And    LORazepam  1 mg Intramuscular Q4H PRN Max 4/day HOLLI Lion      And    benztropine  0.5 mg Intramuscular Q4H PRN Max 4/day HOLLI Lion      benztropine  1 mg Intramuscular Q4H PRN Max 6/day Jordan C Holter, DO      haloperidol lactate  5 mg Intramuscular Q4H PRN Max 4/day HOLLI Lion      And    LORazepam  2 mg Intramuscular Q4H PRN Max 4/day HOLLI Lion      And    benztropine  1 mg Intramuscular Q4H PRN Max 4/day HOLLI Lion      benztropine  1 mg Oral Q4H PRN Max 6/day HOLLI Lion       benztropine  1 mg Oral Q4H PRN Max 6/day Ion C Holter, DO      bisacodyl  10 mg Rectal Daily PRN HOLLI Lion      calcium carbonate  500 mg Oral BID PRN HOLLI Monge      cloZAPine  225 mg Oral HS Hardik So MD      hydrOXYzine HCL  50 mg Oral Q6H PRN Max 4/day Ion C Holter, DO      Or    diphenhydrAMINE  50 mg Intramuscular Q6H PRN Ion C Holter, DO      hydrOXYzine HCL  50 mg Oral Q6H PRN Max 4/day HOLLI Lion      Or    diphenhydrAMINE  50 mg Intramuscular Q6H PRN HOLLI Lion      diphenhydrAMINE-zinc acetate   Topical BID PRN HOLLI Lion      escitalopram  20 mg Oral Daily HOLLI Lion      famotidine  20 mg Oral BID PRN HOLLI Monge      fluticasone  1 spray Each Nare Daily PRN HOLLI Monge      glycopyrrolate  1 mg Oral BID (AM & Afternoon) Bora Rosario MD      glycopyrrolate  2 mg Oral HS Bora Rosario MD      haloperidol  1 mg Oral Q6H PRN HOLLI Lion      haloperidol  2.5 mg Oral Q4H PRN Max 4/day HOLLI Lion      haloperidol  5 mg Oral Q4H PRN Max 4/day HOLLI Lion      hydroCHLOROthiazide  12.5 mg Oral Daily HOLLI Galvan      hydrocortisone   Topical 4x Daily PRN HOLLI Lion      hydrOXYzine HCL  100 mg Oral Q6H PRN Max 4/day Ion C Holter, DO      Or    LORazepam  2 mg Intramuscular Q6H PRN Ion C Holter, DO      hydrOXYzine HCL  100 mg Oral Q6H PRN Max 4/day HOLLI Lion      Or    LORazepam  2 mg Intramuscular Q6H PRN HOLLI Lion      hydrOXYzine HCL  25 mg Oral Q6H PRN Max 4/day Ion C Holter, DO      ibuprofen  600 mg Oral Q8H PRN HOLLI Lion      influenza vaccine  0.5 mL Intramuscular Once Bora Rosario MD      loratadine  10 mg Oral Daily Brina Guillen MD      melatonin  3 mg Oral HS PRN Bora Rosario MD      melatonin  9 mg Oral HS Bora Rosario MD      methocarbamol  500 mg Oral Q6H PRN HOLLI Lion       "multivitamin-minerals  1 tablet Oral Daily Eileen Holliday Mikey, HOLLI      nicotine polacrilex  2 mg Oral Q4H PRN Bora Rosario MD      OLANZapine  5 mg Oral Q4H PRN Max 3/day [facility] Holter, DO      Or    OLANZapine  2.5 mg Intramuscular Q4H PRN Max 3/day [facility] Holter, DO      OLANZapine  5 mg Oral Q3H PRN Max 3/day [facility] Holter, DO      Or    OLANZapine  5 mg Intramuscular Q3H PRN Max 3/day [facility] Holter, DO      OLANZapine  2.5 mg Oral Q4H PRN Max 6/day [facility] Holter, DO      ondansetron  4 mg Oral Q6H PRN HOLLI Lion      pantoprazole  20 mg Oral QAM [facility] Holter, DO      polyethylene glycol  17 g Oral Daily PRN HOLLI Ghotra      propranolol  10 mg Oral Q12H KAYLIE HOLLI Lion      senna-docusate sodium  1 tablet Oral Daily PRN [facility] Cresencioter, DO      senna-docusate sodium  2 tablet Oral HS Melvina Ambron, DO      traZODone  50 mg Oral HS PRN HOLLI Lion      white petrolatum-mineral oil   Topical TID PRN HOLLI Lion         Risks, benefits and possible side effects of Medications:   Risks, benefits, and possible side effects of medications explained to patient and patient verbalizes understanding.      Dual Antipsychotic Rationale: At this time the patient is on dual antipsychotic treatments due to inability to control symptoms on a singular antipsychotic agent and multiple past failed medication trials    Mental Status Evaluation:  Appearance:  Age-appropriate, casually dressed, overweight, marginal grooming and hygiene, limited eye contact, no acute distress   Behavior:  Polite, limited interview cooperation   Speech:  Soft, normal rate, clear, coherent   Mood:  \"Okay\"   Affect:  Blunted, depressed   Thought Process:  [place], linear, goal directed   Associations: [place] associations   Thought Content:  Remains with paranoia in the context of ongoing struggles with auditory hallucinations.  Continues to state that the voices he experiences tell him they are " going to harm him.   Perceptual Disturbances: Continues to report ongoing auditory hallucinations that make derogatory comments that they are going to kill him.  He denies command auditory hallucinations.  He denies visual hallucinations.  He continues to appear internally preoccupied.   Risk Potential: Suicidal ideation - None  Homicidal ideation - None  Potential for aggression - No   Sensorium:  oriented to person, place, and time/date   Memory:  recent and remote memory grossly intact   Consciousness:  alert and awake   Attention/Concentration: attention span and concentration appear shorter than expected for age   Insight:  limited   Judgment: limited   Gait/Station: in bed   Motor Activity: no abnormal movements     Recommended Treatment:   See above for assessment and plan.    Inpatient Psychiatric Certification:  Patient is not at goal. They are not yet ready for discharge. The patient's condition currently requires active psychopharmacological medication management, interdisciplinary coordination with case management, and the utilization of adjunctive milieu and group therapy to augment psychopharmacological efficacy. The patient's risk of morbidity, and progression or decompensation of psychiatric disease, is higher without this current treatment. Based upon physical, mental and social evaluations, I certify that inpatient psychiatric services are medically necessary for this patient for a duration of >2 midnights for the treatment of Schizoaffective disorder, bipolar type (HCC)  Available alternative community resources do not meet the patient's mental health care needs.  I further attest that an established written individualized plan of care has been implemented and is outlined in the patient's medical records.    Counseling/Coordination of Care    Total unit time spent today was greater than 15 minutes. Greater than 50% of total time was spent with the patient and/or patient's relatives and/or  coordination of patient's care.     Patient's rights, confidentiality, exceptions to confidentiality, use of electronic medical record including appropriate staff access to medical record regarding behavioral health services and consent to treatment were reviewed.    Note Share:     This note was not shared with the patient due to reasonable likelihood of causing patient harm     Hardik So MD   Psychiatry, PGY-4

## 2024-10-17 NOTE — ASSESSMENT & PLAN NOTE
Reviewed on 10/17/24    Continue famotidine 20 mg tablet BID per medical team  Continue glycopyrrolate 1 mg tablet BID AM and afternoon per medical team  Continue glycopyrrolate 2 mg tablet before bed per medical team  Continue pantoprazole EC 20 mg tablet every morning per medical team

## 2024-10-17 NOTE — SOCIAL WORK
This writer spoke with patient's Aunt regarding her family visit. Aunt confirms visit with patient on 10/19/24 to celebrate his birthday. Family will arrive between [id number]. Family has permission to bring in Chinese Food and cake to celebrate patient's birthday. Family is aware this must be store bought and can only be shared with the patient. This writer confirmed plan is for the patient t be discharged to his Aunt's home with ACT services. Patient's sister Tere will visit with patient on Monday. This writer will obtain an update on the [employer] program from the sister.

## 2024-10-17 NOTE — ASSESSMENT & PLAN NOTE
Reviewed on 10/17/24    Continue senna-docusate sodium two tablets before bed per medical team    No associated orders from this encounter found during lookback period of 72 hours.

## 2024-10-17 NOTE — NURSING NOTE
Pt is visible on the unit and social with select peers. Consumed 100% of lunch and dinner. Took medications without incidence. Pt is pleasant and cooperative. Attended 3/10 groups. Reports depression related to increased AH. Denies anxiety and SI/HI/VH. No behavioral issues. Pt offers no complaints.

## 2024-10-17 NOTE — NURSING NOTE
Pt is accepting of medication without incidence and meal compliant. Pt is polite, pleasant, but remains depressed and with AH. Pt is social with select peers and smiles when he was sang happy birthday at lunch with peers. Pt uses pt phone and appears to have good conversation with . Otherwise no new concerns.

## 2024-10-17 NOTE — ASSESSMENT & PLAN NOTE
"Reviewed on 10/17/24    Patient remains with ongoing depressive symptoms and ongoing auditory hallucinations of \"loud, angry, irritating voices.\" Remains with paranoia surrounding these voices and fears that someone is out to harm him.    Continue Clozapine 225 mg at bedtime for psychosis  Continue to obtain CBC (weekly), BNP (every 2 weeks), CK (weekly) for ongoing clozapine monitoring    Continue Abilify 30 mg daily for psychosis  Continue escitalopram 20 mg tablet daily for depression and anxiety  Continue senna-docusate sodium two 50 mg tablets daily before bed for constipation    Continue propanolol 10 mg twice daily for anxiety     No associated orders from this encounter found during lookback period of 72 hours.    "

## 2024-10-18 PROCEDURE — 99232 SBSQ HOSP IP/OBS MODERATE 35: CPT | Performed by: PSYCHIATRY & NEUROLOGY

## 2024-10-18 RX ADMIN — ESCITALOPRAM OXALATE 20 MG: 10 TABLET, FILM COATED ORAL at 09:04

## 2024-10-18 RX ADMIN — CLOZAPINE 225 MG: 25 TABLET ORAL at 21:02

## 2024-10-18 RX ADMIN — GLYCOPYRROLATE 1 MG: 1 TABLET ORAL at 13:01

## 2024-10-18 RX ADMIN — PROPRANOLOL HYDROCHLORIDE 10 MG: 10 TABLET ORAL at 21:03

## 2024-10-18 RX ADMIN — NICOTINE POLACRILEX 2 MG: 2 GUM, CHEWING ORAL at 21:03

## 2024-10-18 RX ADMIN — PROPRANOLOL HYDROCHLORIDE 10 MG: 10 TABLET ORAL at 09:03

## 2024-10-18 RX ADMIN — NICOTINE POLACRILEX 2 MG: 2 GUM, CHEWING ORAL at 17:07

## 2024-10-18 RX ADMIN — NICOTINE POLACRILEX 2 MG: 2 GUM, CHEWING ORAL at 12:52

## 2024-10-18 RX ADMIN — SENNOSIDES AND DOCUSATE SODIUM 2 TABLET: 8.6; 5 TABLET ORAL at 21:03

## 2024-10-18 RX ADMIN — GLYCOPYRROLATE 2 MG: 1 TABLET ORAL at 21:02

## 2024-10-18 RX ADMIN — ATROPINE SULFATE 1 DROP: 10 SOLUTION/ DROPS OPHTHALMIC at 21:02

## 2024-10-18 RX ADMIN — PANTOPRAZOLE SODIUM 20 MG: 20 TABLET, DELAYED RELEASE ORAL at 09:02

## 2024-10-18 RX ADMIN — ARIPIPRAZOLE 30 MG: 15 TABLET ORAL at 09:05

## 2024-10-18 RX ADMIN — LORATADINE 10 MG: 10 TABLET ORAL at 09:03

## 2024-10-18 RX ADMIN — MELATONIN TAB 3 MG 9 MG: 3 TAB at 21:02

## 2024-10-18 RX ADMIN — GLYCOPYRROLATE 1 MG: 1 TABLET ORAL at 09:04

## 2024-10-18 RX ADMIN — NICOTINE POLACRILEX 2 MG: 2 GUM, CHEWING ORAL at 09:05

## 2024-10-18 RX ADMIN — MULTIPLE VITAMINS W/ MINERALS TAB 1 TABLET: TAB ORAL at 09:03

## 2024-10-18 RX ADMIN — AMLODIPINE BESYLATE 5 MG: 5 TABLET ORAL at 09:03

## 2024-10-18 RX ADMIN — HYDROCHLOROTHIAZIDE 12.5 MG: 12.5 TABLET ORAL at 09:02

## 2024-10-18 NOTE — ASSESSMENT & PLAN NOTE
Reviewed on 10/18/24    Continue senna-docusate sodium two tablets before bed per medical team    No associated orders from this encounter found during lookback period of 72 hours.

## 2024-10-18 NOTE — NURSING NOTE
Pt is calm, cooperative and visible on unit. Pt reports AH, depression and   anxiety; denies SI/HI/VH. Compliant with scheduled medications. Appetite remains poor. Pt interacts appropriately with staff and peers. No behavioral issues. Q 15 min checks maintained.

## 2024-10-18 NOTE — SOCIAL WORK
This writer spoke with patient's sister Tere. She reports she spoke with RENETTA Kang, however patient's medicaid benefits will still be affected because patient receives over the limit for SSDI as this is his primary source of income. Patient receives $1780 as SSDI. This writer discussed county has placed patient on the [facility] funding wait list for ACT services. Sister reports patient needs ACT services for him to recover in the community. Patient sisters will visit with patient on 10/22 to celebrate his birthday.

## 2024-10-18 NOTE — PLAN OF CARE
Problem: Alteration in Thoughts and Perception  Goal: Agree to be compliant with medication regime, as prescribed and report medication side effects  Description: Interventions:  - Offer appropriate PRN medication and supervise ingestion; conduct AIMS, as needed   Outcome: Progressing  Goal: Attend and participate in unit activities, including therapeutic, recreational, and educational groups  Description: Interventions:  -Encourage Visitation and family involvement in care  Outcome: Progressing     Problem: Depression  Goal: Refrain from isolation  Description: Interventions:  - Develop a trusting relationship   - Encourage socialization   Outcome: Progressing  Goal: Refrain from self-neglect  Outcome: Progressing     Problem: Anxiety  Goal: Anxiety is at manageable level  Description: Interventions:  - Assess and monitor patient's anxiety level.   - Monitor for signs and symptoms (heart palpitations, chest pain, shortness of breath, headaches, nausea, feeling jumpy, restlessness, irritable, apprehensive).   - Collaborate with interdisciplinary team and initiate plan and interventions as ordered.  - [place] patient to unit/surroundings  - Explain treatment plan  - Encourage participation in care  - Encourage verbalization of concerns/fears  - Identify coping mechanisms  - Assist in developing anxiety-reducing skills  - Administer/offer alternative therapies  - Limit or eliminate stimulants  Outcome: Progressing     Problem: Alteration in Orientation  Goal: Interact with staff daily  Description: Interventions:  - Assess and re-assess patient's level of orientation  - Engage patient in 1 on 1 interactions, daily, for a minimum of 15 minutes   - Establish rapport/trust with patient   Outcome: Progressing  Goal: Cooperate with recommended testing/procedures  Description: Interventions:  - Determine need for ancillary testing  - Observe for mental status changes  - Implement falls/precaution protocol   Outcome:  Progressing  Goal: Complete daily ADLs, including personal hygiene independently, as able  Description: Interventions:  - Observe, teach, and assist patient with ADLS  Outcome: Progressing     Problem: Alteration in Thoughts and Perception  Goal: Treatment Goal: Gain control of psychotic behaviors/thinking, reduce/eliminate presenting symptoms and demonstrate improved reality functioning upon discharge  Outcome: Not Progressing

## 2024-10-18 NOTE — PROGRESS NOTES
10/18/24 0814   Team Meeting   Meeting Type Daily Rounds   Team Members Present   Team Members Present Physician;Nurse;;Other (Discipline and Name)   Physician Team Member Abraham   Nursing Team Member Claudia   Social Work Team Member Jose J ORTEGA   Other (Discipline and Name) Kathleen PCM   Patient/Family Present   Patient Present No   Patient's Family Present No   OTHER   Team Meeting - Additional Comments Wang PCM     Groups Participation  3/10.   Patient's compliant with his medications. He's depressed, reports AH. Family will visit this weekend for his birthday. He has limited engagement in his treatment. Pending discharge to home with ACT services.

## 2024-10-18 NOTE — PROGRESS NOTES
"Progress Note - Behavioral Health   Name: Alberto Berumen 29 y.o. male I MRN: [id number]  Unit/Bed#: [facility] 108-02 I Date of Admission: 3/29/2024   Date of Service: 10/18/2024 I Hospital Day: 203     Assessment & Plan  Schizoaffective disorder, bipolar type (HCC)  Reviewed on 10/18/24    Patient continues to experience ongoing depressive and anxious symptoms, as well as auditory hallucinations, noting that the voices say \"I hope I die\". Patient states that he does not currently have a desire to harm himself or others. Patient denies symptoms consistent with visual hallucinations at this time.     Continue Clozapine 225 mg at bedtime for psychosis  Continue to obtain CBC (weekly), BNP (every 2 weeks), CK (weekly) for ongoing clozapine monitoring    Continue Abilify 30 mg daily for psychosis  Continue escitalopram 20 mg tablet daily for depression and anxiety  Continue senna-docusate sodium two 50 mg tablets daily before bed for constipation    Continue propanolol 10 mg twice daily for anxiety     No associated orders from this encounter found during lookback period of 72 hours.    Chronic idiopathic constipation  Reviewed on 10/18/24    Continue senna-docusate sodium two tablets before bed per medical team    No associated orders from this encounter found during lookback period of 72 hours.  GERD (gastroesophageal reflux disease)  Reviewed on 10/18/24    Continue famotidine 20 mg tablet BID per medical team   Continue glycopyrrolate 1 mg tablet BID AM and afternoon per medical team  Continue glycopyrrolate 2 mg tablet before bed per medical team  Continue pantoprazole EC 20 mg tablet every morning per medical team  Medical clearance for psychiatric admission  Reviewed on 10/18/24    Ongoing by medical  Tobacco abuse  Reviewed on 10/18/24    Continue to encourage cessation upon discharge  Elevated hemoglobin A1c  Reviewed on 10/18/24    Continue metformin 500 mg tablet BID with meals per medical team      [facility] " "[facility]  Psychiatric Progress Note - Department of Behavioral Health   Alberto Berumen 29 y.o. male MRN: [id number]  Unit/Bed#: [facility] 108-02 Encounter: [id number]    SUBJECTIVE     Patient evaluated this a.m. for continuity of care. Patient was discussed at length with nursing and treatment team. Per nursing, patient was bright and visible yesterday as the unit celebrated his birthday. Nursing reports that patient has otherwise expressed feelings of depression and anxiety, and that he states that he continues to hear voices. He attended 3 out of 10 groups. He has a planned visit from his aunt on Saturday and his sister on Tuesday.     During evaluation, no acute distress is noted. Alberto Berumen remained in bed with his eyes closed at the time of this interview. He was otherwise cooperative. He states that he is feeling \"okay\" this morning. He notes that he slept well, but did not get up for breakfast this morning. He says that he is \"excited\" for the upcoming visits from his aunt on Saturday and his sister on Tuesday. He further notes feelings of anxiety and a depressed mood. Patient endorses symptoms consistent with auditory hallucinations, stating that he hears voices that say \"I hope I die\". He states that he has thoughts of dying, but does not currently have any desire to commit self-harm or suicide. He further denies symptoms consistent with visual hallucinations, and denies HI. He does not appear to be responding to any internal stimuli at this time, and does not appear to be having overt delusions.     PSYCHIATRIC REVIEW OF SYSTEMS     Behavior over the last 24 hours:  unchanged  Sleep: normal  Appetite: normal  Medication side effects: No    Progress Toward Goals: Patient has been adherent with his medication regimen, and was visible and social yesterday for his birthday. He attended 3 out of 10 social groups.  Patient Acceptance: Patient is adherent to his treatment regimen.    Patient " Understanding: Patient acknowledges that he hears voices and that he is experiencing a depressed mood.   Patient Involvement in Reintegration Process: Patient is in contact with his family, and he looks forward to a visit from his aunt and sister in the upcoming week.   Patient's Therapeutic Relationship with Psychiatrist: Trusting  Patient's Optimism Regarding Recovery: Patient is waiting for further information regarding discharge with ACT services.   Group Attendance: Patient attended 3 out of 10 groups yesterday.     REVIEW OF SYSTEMS   Review of systems: no complaints    OBJECTIVE     Vital Signs in Past 24 Hours:  Temp:  [97.3 °F (36.3 °C)-97.8 °F (36.6 °C)] 97.3 °F (36.3 °C)  HR:  [72-98] 72  BP: (120-146)/(70-75) 120/70  Resp:  [18] 18  SpO2:  [97 %-98 %] 97 %  O2 Device: None (Room air)    Intake/Output in Past 24 hours:  No intake/output data recorded.  No intake/output data recorded.        Laboratory Results:  I have personally reviewed all pertinent laboratory/tests results.  Labs in last 72 hours:   Recent Labs     10/16/24  0914   WBC 8.01   RBC 5.66*   HGB 13.5   HCT 42.5      RDW 14.3   NEUTROABS 3.83   SODIUM 140   K 3.8      CO2 29   BUN 9   CREATININE 0.91   GLUC 94   GLUF 94   CALCIUM 9.5       Behavioral Health Medications: all current active meds have been reviewed.    Current Facility-Administered Medications   Medication Dose Route Frequency Provider Last Rate    acetaminophen  650 mg Oral Q4H PRN Jordan C Holter, DO      acetaminophen  650 mg Oral Q6H PRN HOLLI Lion      aluminum-magnesium hydroxide-simethicone  30 mL Oral Q4H PRN Jordan C Holter, DO      amLODIPine  5 mg Oral Daily HOLLI Lion      ARIPiprazole  30 mg Oral Daily Bora Rosario MD      Artificial Tears  1 drop Both Eyes Q3H PRN Jordan C Holter, DO      atropine  1 drop Sublingual HS Harjeet Torres DO      haloperidol lactate  2.5 mg Intramuscular Q4H PRN Max 4/day HOLLI Lion      And     LORazepam  1 mg Intramuscular Q4H PRN Max 4/day HOLLI Lion      And    benztropine  0.5 mg Intramuscular Q4H PRN Max 4/day HOLLI Lion      benztropine  1 mg Intramuscular Q4H PRN Max 6/day Ion Dupontter, DO      haloperidol lactate  5 mg Intramuscular Q4H PRN Max 4/day HOLLI Lion      And    LORazepam  2 mg Intramuscular Q4H PRN Max 4/day HOLLI Lion      And    benztropine  1 mg Intramuscular Q4H PRN Max 4/day HOLLI Lion      benztropine  1 mg Oral Q4H PRN Max 6/day HOLLI Lion      benztropine  1 mg Oral Q4H PRN Max 6/day Jordan C Holter, DO      bisacodyl  10 mg Rectal Daily PRN HOLLI Lion      calcium carbonate  500 mg Oral BID PRN HOLLI Monge      cloZAPine  225 mg Oral HS Hardik So MD      hydrOXYzine HCL  50 mg Oral Q6H PRN Max 4/day Jordan C Holter, DO      Or    diphenhydrAMINE  50 mg Intramuscular Q6H PRN Jordan C Holter, DO      hydrOXYzine HCL  50 mg Oral Q6H PRN Max 4/day HOLLI Lion      Or    diphenhydrAMINE  50 mg Intramuscular Q6H PRN HOLLI Lion      diphenhydrAMINE-zinc acetate   Topical BID PRN HOLLI Lion      escitalopram  20 mg Oral Daily HOLLI Lion      famotidine  20 mg Oral BID PRN HOLLI Monge      fluticasone  1 spray Each Nare Daily PRN HOLLI Monge      glycopyrrolate  1 mg Oral BID (AM & Afternoon) Bora Rosario MD      glycopyrrolate  2 mg Oral HS Bora Rosario MD      haloperidol  1 mg Oral Q6H PRN HOLLI Lion      haloperidol  2.5 mg Oral Q4H PRN Max 4/day HOLLI Lion      haloperidol  5 mg Oral Q4H PRN Max 4/day HOLLI Lion      hydroCHLOROthiazide  12.5 mg Oral Daily HOLLI Galvan      hydrocortisone   Topical 4x Daily PRN HOLLI Lion      hydrOXYzine HCL  100 mg Oral Q6H PRN Max 4/day Jordan C Holter, DO      Or    LORazepam  2 mg Intramuscular Q6H PRN Jordan C Holter, DO      hydrOXYzine  HCL  100 mg Oral Q6H PRN Max 4/day HOLLI Lion      Or    LORazepam  2 mg Intramuscular Q6H PRN HOLLI Lion      hydrOXYzine HCL  25 mg Oral Q6H PRN Max 4/day [facility] Holter, DO      ibuprofen  600 mg Oral Q8H PRN HOLLI Lion      influenza vaccine  0.5 mL Intramuscular Once Bora Rosario MD      loratadine  10 mg Oral Daily Brina Guillen MD      melatonin  3 mg Oral HS PRN Bora Rosario MD      melatonin  9 mg Oral HS Bora Rosario MD      methocarbamol  500 mg Oral Q6H PRN HOLLI Lion      multivitamin-minerals  1 tablet Oral Daily Eileen Cherrysathish Jensen, OHLLI      nicotine polacrilex  2 mg Oral Q4H PRN Bora Rosario MD      OLANZapine  5 mg Oral Q4H PRN Max 3/day [facility] Holter, DO      Or    OLANZapine  2.5 mg Intramuscular Q4H PRN Max 3/day [facility] Holter, DO      OLANZapine  5 mg Oral Q3H PRN Max 3/day [facility] Holter, DO      Or    OLANZapine  5 mg Intramuscular Q3H PRN Max 3/day [facility] Holter, DO      OLANZapine  2.5 mg Oral Q4H PRN Max 6/day [facility] Holter, DO      ondansetron  4 mg Oral Q6H PRN HOLLI Lion      [facility]oprazole  [facility]      polyethylene glycol  17 g Oral Daily PRN HOLLI Ghotra      propranolol  10 mg Oral Q12H [facility] HOLLI Lion      senna-docusate sodium  1 tablet Oral Daily PRN [facility] Cresencioter, DO      senna-docusate sodium  2 tablet Oral HS Melvina Ambron, DO      traZODone  50 mg Oral HS PRN HOLLI Lion      white petrolatum-mineral oil   Topical TID PRN HOLLI Lion         Risks, benefits and possible side effects of Medications:   Risks, benefits, and possible side effects of medications explained to patient and patient verbalizes understanding.      Dual Antipsychotic Rationale: Due to inability to control symptoms on a single antipsychotic agent, as well as multiple past failed medication trials.      Mental Status Evaluation:  Appearance:  Patient remained laying in bed, wrapped in a blanket,  "with a sheet wrapped over his eyes.    Behavior:  Patient was laying in bed and refused to open his eyes or get up to talk. No abnormal movements were noted.    Speech:  Speech was scant and quiet, as patient refused to fully wake up for the interview.    Mood:  \"Okay\"   Affect:  Mood-congruent, blunted, appropriate to thought content and stable throughout the interview.    Thought Process:  logical, goal directed, linear   Associations: intact associations   Thought Content:  Patient denies symptoms consistent with persecutory, grandiose, or bizarre delusions, and does not appear to be having overt delusions.    Perceptual Disturbances: Patient reports hearing voices that say \"I hope I die\", but denies symptoms consistent with visual hallucinations. Patient does not appear to be responding to internal stimuli at this time.    Risk Potential: Suicidal ideation - notes that he has thought about it, but does not have a desire currently  Homicidal ideation - denies  Potential for aggression - Not at present   Sensorium:  oriented to person, place, and time/date   Memory:  recent and remote memory grossly intact   Consciousness:  sedated   Attention/Concentration: attention span and concentration appear shorter than expected for age   Insight:  limited   Judgment: Unable to assess at this time   Gait/Station: unable to assess   Motor Activity: no abnormal movements     Recommended Treatment:   See above for assessment and plan.    Counseling/Coordination of Care    Total unit time spent today was greater than 15 minutes. Greater than 50% of total time was spent with the patient and/or patient's relatives and/or coordination of patient's care.     Patient's rights, confidentiality, exceptions to confidentiality, use of electronic medical record including appropriate staff access to medical record regarding behavioral health services and consent to treatment were reviewed.    Note Share:     This note was not shared with " the patient due to reasonable likelihood of causing patient harm     Yamila Lopes   Psychiatry, PA-S

## 2024-10-18 NOTE — ASSESSMENT & PLAN NOTE
Reviewed on 10/18/24    Continue famotidine 20 mg tablet BID per medical team   Continue glycopyrrolate 1 mg tablet BID AM and afternoon per medical team  Continue glycopyrrolate 2 mg tablet before bed per medical team  Continue pantoprazole EC 20 mg tablet every morning per medical team

## 2024-10-18 NOTE — ASSESSMENT & PLAN NOTE
"Reviewed on 10/18/24    Patient continues to experience ongoing depressive and anxious symptoms, as well as auditory hallucinations, noting that the voices say \"I hope I die\". Patient states that he does not currently have a desire to harm himself or others. Patient denies symptoms consistent with visual hallucinations at this time.     Continue Clozapine 225 mg at bedtime for psychosis  Continue to obtain CBC (weekly), BNP (every 2 weeks), CK (weekly) for ongoing clozapine monitoring    Continue Abilify 30 mg daily for psychosis  Continue escitalopram 20 mg tablet daily for depression and anxiety  Continue senna-docusate sodium two 50 mg tablets daily before bed for constipation    Continue propanolol 10 mg twice daily for anxiety     No associated orders from this encounter found during lookback period of 72 hours.    "

## 2024-10-19 PROCEDURE — 99232 SBSQ HOSP IP/OBS MODERATE 35: CPT | Performed by: PHYSICIAN ASSISTANT

## 2024-10-19 RX ADMIN — SENNOSIDES AND DOCUSATE SODIUM 2 TABLET: 8.6; 5 TABLET ORAL at 21:13

## 2024-10-19 RX ADMIN — HYDROCHLOROTHIAZIDE 12.5 MG: 12.5 TABLET ORAL at 08:41

## 2024-10-19 RX ADMIN — GLYCOPYRROLATE 2 MG: 1 TABLET ORAL at 21:13

## 2024-10-19 RX ADMIN — GLYCOPYRROLATE 1 MG: 1 TABLET ORAL at 08:41

## 2024-10-19 RX ADMIN — PROPRANOLOL HYDROCHLORIDE 10 MG: 10 TABLET ORAL at 21:13

## 2024-10-19 RX ADMIN — PROPRANOLOL HYDROCHLORIDE 10 MG: 10 TABLET ORAL at 08:41

## 2024-10-19 RX ADMIN — NICOTINE POLACRILEX 2 MG: 2 GUM, CHEWING ORAL at 13:49

## 2024-10-19 RX ADMIN — AMLODIPINE BESYLATE 5 MG: 5 TABLET ORAL at 08:42

## 2024-10-19 RX ADMIN — ATROPINE SULFATE 1 DROP: 10 SOLUTION/ DROPS OPHTHALMIC at 21:29

## 2024-10-19 RX ADMIN — GLYCOPYRROLATE 1 MG: 1 TABLET ORAL at 13:49

## 2024-10-19 RX ADMIN — NICOTINE POLACRILEX 2 MG: 2 GUM, CHEWING ORAL at 09:03

## 2024-10-19 RX ADMIN — MULTIPLE VITAMINS W/ MINERALS TAB 1 TABLET: TAB ORAL at 08:41

## 2024-10-19 RX ADMIN — CLOZAPINE 225 MG: 25 TABLET ORAL at 21:13

## 2024-10-19 RX ADMIN — LORATADINE 10 MG: 10 TABLET ORAL at 08:41

## 2024-10-19 RX ADMIN — ESCITALOPRAM OXALATE 20 MG: 10 TABLET, FILM COATED ORAL at 08:41

## 2024-10-19 RX ADMIN — PANTOPRAZOLE SODIUM 20 MG: 20 TABLET, DELAYED RELEASE ORAL at 08:41

## 2024-10-19 RX ADMIN — NICOTINE POLACRILEX 2 MG: 2 GUM, CHEWING ORAL at 18:01

## 2024-10-19 RX ADMIN — ARIPIPRAZOLE 30 MG: 15 TABLET ORAL at 08:41

## 2024-10-19 RX ADMIN — MELATONIN TAB 3 MG 9 MG: 3 TAB at 21:12

## 2024-10-19 NOTE — ASSESSMENT & PLAN NOTE
Reviewed on 10/19/24    Continue famotidine 20 mg tablet BID per medical team   Continue glycopyrrolate 1 mg tablet BID AM and afternoon per medical team  Continue glycopyrrolate 2 mg tablet before bed per medical team  Continue pantoprazole EC 20 mg tablet every morning per medical team    No associated orders from this encounter found during lookback period of 72 hours.

## 2024-10-19 NOTE — NURSING NOTE
Pt is visible on the unit, social with peers and staff. Pt is pleasant, calm, and cooperative. Pt reports feeling better with his mood. Pt reports he is looking forward to his visit with his family today. Pt denies SI/HI/VH. Pt refused breakfast. Pt is medication complaint, safety checks ongoing.

## 2024-10-19 NOTE — PROGRESS NOTES
Assessment & Plan  Schizoaffective disorder, bipolar type (HCC)  Reviewed on 10/19/24  Pt is having ongoing AH of the same quality but increased frequency which is increasing his depression and anxiety over the last 2 weeks per his report.    Continue Clozapine 225 mg at bedtime for psychosis -- to consider further careful titration for Sxs depending on response  Continue to obtain CBC with diff, CRP, and CK (weekly on Tues), and a Clozapine level and BNP (every 2 weeks on Tues)    Continue Abilify 30 mg daily for psychosis  Continue escitalopram 20 mg tablet daily for depression and anxiety  Continue senna-docusate sodium two 50 mg tablets daily before bed for constipation  Continue propanolol 10 mg twice daily for anxiety     Pt remains on dual antipsychotic Tx due to failure on monotherapy      ACT team referral was made    No associated orders from this encounter found during lookback period of 72 hours.    Chronic idiopathic constipation  Reviewed on 10/19/24    Continue senna-docusate sodium two tablets before bed per medical team    No associated orders from this encounter found during lookback period of 72 hours.  GERD (gastroesophageal reflux disease)  Reviewed on 10/19/24    Continue famotidine 20 mg tablet BID per medical team   Continue glycopyrrolate 1 mg tablet BID AM and afternoon per medical team  Continue glycopyrrolate 2 mg tablet before bed per medical team  Continue pantoprazole EC 20 mg tablet every morning per medical team    No associated orders from this encounter found during lookback period of 72 hours.  Medical clearance for psychiatric admission  Reviewed on 10/19/24    Ongoing by medical    No associated orders from this encounter found during lookback period of 72 hours.    Tobacco abuse  Reviewed on 10/19/24    Continue to encourage cessation upon discharge    No associated orders from this encounter found during lookback period of 72 hours.  Elevated hemoglobin A1c  Reviewed on  "10/19/24      No associated orders from this encounter found during lookback period of 72 hours.         Progress Note - Behavioral Health     Alberto Berumen 29 y.o. male MRN: [id number]  Unit/Bed#: [facility] 108-02 Encounter: [id number]      Alberto Berumen was seen for continuing care and reviewed with treatment team.  Pt was agreeable to interview on approach earlier, though quiet.  He reported a c/o \"The voices\" -- continues to hear the AH of voices telling him he or his family will be killed.  The AH have increased in frequency over the past 2 weeks, which, in turn, has caused an increase in his depression and anxiety.  He also reported paranoia (thoughts that people are talking badly about him).  He presently denies SI, HI, VH, TH, or OH.  He stated his sleep and appetite are adequate overall, though per nursing flow sheet, the appetite can vary, and was not very good today.      Per EMR and floor team, the Pt has been calm, cooperative and medication compliant.  He used a punching bag to cope with his Sxs last night.  Generally he has been visible and social in the milieu.  He is attending most groups with appropriate behavior among peers.  No report of any self-harm behavior or aggression toward others.    Behavior over the last 24 hours: unchanged.   Sleep:Adequate  Appetite:  Varies, adequate usually, but not so good today  Medication side effects: None per Pt    ROS: No complaints per Pt, (All ROS Negative)    Labs/Tests: Reviewed    Mental Status Evaluation:  Appearance:  Dressed in his shirt and hospital bottoms, partial eye contact,   Behavior:  Calm, cooperative, at the most basic level, withdrawn   Speech:  Soft, clear with normal rate  when he did speak but scant   Mood:  Anxious, Depressed   Affect:  Constricted   Thought Process:  Organized, Goal directed   Associations: Intact associations   Thought Content:  No verbalized delusions   Perceptual Disturbances: Pt denies any hallucinations or " paranoia and does not appear to be responding to internal stimuli   Risk Potential: Pt presently denies SI or HI    Sensorium:  Self, birthday, Place, Day of the week, Month, Year   Memory:  short term memory grossly intact   Consciousness:  alert, awake   Attention: Attention span appeared shorter than expected for age   Insight:  Limited   Judgment: Fair   Gait/Station: Normal gait/station   Motor Activity: No abnormal movements     Vitals:    10/18/24 0811 10/18/24 1526 10/18/24 2030 10/19/24 0756   BP: 120/70 135/90 127/75 126/72   BP Location: Left arm Right arm Left arm Right arm   Pulse: 72 98 99 79   Resp: 18 18 18 18   Temp: (!) 97.3 °F (36.3 °C) 97.5 °F (36.4 °C)  97.6 °F (36.4 °C)   TempSrc: Temporal Temporal  Temporal   SpO2: 97% 98% 100% 100%   Weight:       Height:           Labwork: I have personally reviewed all pertinent laboratory/tests results.  Recent Results (from the past 160 hour(s))   CBC and differential    Collection Time: 10/16/24  9:14 AM   Result Value Ref Range    WBC 8.01 4.31 - 10.16 Thousand/uL    RBC 5.66 (H) 3.88 - 5.62 Million/uL    Hemoglobin 13.5 12.0 - 17.0 g/dL    Hematocrit 42.5 36.5 - 49.3 %    MCV 75 (L) 82 - 98 fL    MCH 23.9 (L) 26.8 - 34.3 pg    MCHC 31.8 31.4 - 37.4 g/dL    RDW 14.3 11.6 - 15.1 %    MPV 10.4 8.9 - 12.7 fL    Platelets 274 149 - 390 Thousands/uL    nRBC 0 /100 WBCs    Segmented % 47 43 - 75 %    Immature Grans % 0 0 - 2 %    Lymphocytes % 38 14 - 44 %    Monocytes % 9 4 - 12 %    Eosinophils Relative 5 0 - 6 %    Basophils Relative 1 0 - 1 %    Absolute Neutrophils 3.83 1.85 - 7.62 Thousands/µL    Absolute Immature Grans 0.01 0.00 - 0.20 Thousand/uL    Absolute Lymphocytes 3.00 0.60 - 4.47 Thousands/µL    Absolute Monocytes 0.71 0.17 - 1.22 Thousand/µL    Eosinophils Absolute 0.40 0.00 - 0.61 Thousand/µL    Basophils Absolute 0.06 0.00 - 0.10 Thousands/µL   C-reactive protein    Collection Time: 10/16/24  9:14 AM   Result Value Ref Range    CRP 8.0 (H)  <3.0 mg/L   B-Type Natriuretic Peptide(BNP)    Collection Time: 10/16/24  9:14 AM   Result Value Ref Range    BNP 12 0 - 100 pg/mL   Clozapine-[facility]    Collection Time: 10/16/24  9:14 AM   Result Value Ref Range    Clozapine Lvl 267 (L) 350 - 900 ng/mL   CK    Collection Time: 10/16/24  9:14 AM   Result Value Ref Range    Total  39 - 308 U/L   Basic metabolic panel    Collection Time: 10/16/24  9:14 AM   Result Value Ref Range    Sodium 140 135 - 147 mmol/L    Potassium 3.8 3.5 - 5.3 mmol/L    Chloride 102 96 - 108 mmol/L    CO2 29 21 - 32 mmol/L    ANION GAP 9 4 - 13 mmol/L    BUN 9 5 - 25 mg/dL    Creatinine 0.91 0.60 - 1.30 mg/dL    Glucose 94 65 - 140 mg/dL    Glucose, Fasting 94 65 - 99 mg/dL    Calcium 9.5 8.4 - 10.2 mg/dL    eGFR 115 ml/min/1.73sq m          Progress Toward Goals:  Based on today's interview and review of prior notes, Pt has been stable in the past 24hrs.     Assessment & Plan   Principal Problem:    Schizoaffective disorder, bipolar type (HCC)  Active Problems:    GERD (gastroesophageal reflux disease)    Medical clearance for psychiatric admission    Tobacco abuse    T wave inversion in EKG    Chronic idiopathic constipation    Confluent and reticulate papillomatosis    Class 2 obesity in adult    Primary hypertension    Elevated hemoglobin A1c    Bilateral lower extremity edema      Recommended Treatment: Continue with pharmacotherapy, group therapy, milieu therapy and occupational therapy.  The patient will be maintained on the following medications:  Current Facility-Administered Medications   Medication Dose Route Frequency Provider Last Rate    acetaminophen  650 mg Oral Q4H PRN Jordan C Holter, DO      acetaminophen  650 mg Oral Q6H PRN HOLLI Lion      aluminum-magnesium hydroxide-simethicone  30 mL Oral Q4H PRN Jordan C Holter, DO      amLODIPine  5 mg Oral Daily HOLLI Lion      ARIPiprazole  30 mg Oral Daily Bora Rosario MD      Artificial Tears  1 drop Both  Eyes Q3H PRN Jordan C Holter, DO      atropine  1 drop Sublingual HS Harjeet Torres, DO      haloperidol lactate  2.5 mg Intramuscular Q4H PRN Max 4/day HOLLI Lion      And    LORazepam  1 mg Intramuscular Q4H PRN Max 4/day STEVE LionNP      And    benztropine  0.5 mg Intramuscular Q4H PRN Max 4/day STEVE LionNP      benztropine  1 mg Intramuscular Q4H PRN Max 6/day Jordan C Holter, DO      haloperidol lactate  5 mg Intramuscular Q4H PRN Max 4/day STEVE LionNP      And    LORazepam  2 mg Intramuscular Q4H PRN Max 4/day HOLLI Lion      And    benztropine  1 mg Intramuscular Q4H PRN Max 4/day HOLLI Lion      benztropine  1 mg Oral Q4H PRN Max 6/day HOLLI Lion      benztropine  1 mg Oral Q4H PRN Max 6/day Jordan C Holter, DO      bisacodyl  10 mg Rectal Daily PRN HOLLI Lion      calcium carbonate  500 mg Oral BID PRN HOLLI Monge      cloZAPine  225 mg Oral HS Hardik oS MD      hydrOXYzine HCL  50 mg Oral Q6H PRN Max 4/day Jordan C Holter, DO      Or    diphenhydrAMINE  50 mg Intramuscular Q6H PRN Jordan C Holter, DO      hydrOXYzine HCL  50 mg Oral Q6H PRN Max 4/day HOLLI Lion      Or    diphenhydrAMINE  50 mg Intramuscular Q6H PRN HOLLI Lion      diphenhydrAMINE-zinc acetate   Topical BID PRN HOLLI Lion      escitalopram  20 mg Oral Daily HOLLI Lion      famotidine  20 mg Oral BID PRN HOLLI Monge      fluticasone  1 spray Each Nare Daily PRN HOLLI Monge      glycopyrrolate  1 mg Oral BID (AM & Afternoon) Bora Rosario MD      glycopyrrolate  2 mg Oral HS Bora Rosario MD      haloperidol  1 mg Oral Q6H PRN HOLLI Lion      haloperidol  2.5 mg Oral Q4H PRN Max 4/day HOLLI Lion      haloperidol  5 mg Oral Q4H PRN Max 4/day HOLLI Lion      hydroCHLOROthiazide  12.5 mg Oral Daily HOLLI Galvan      hydrocortisone   Topical 4x  Daily PRN HOLLI Lion      hydrOXYzine HCL  100 mg Oral Q6H PRN Max 4/day [facility] Holter, DO      Or    LORazepam  2 mg Intramuscular Q6H PRN [facility] Holter, DO      hydrOXYzine HCL  100 mg Oral Q6H PRN Max 4/day HOLLI Lion      Or    LORazepam  2 mg Intramuscular Q6H PRN HOLLI Lion      hydrOXYzine HCL  25 mg Oral Q6H PRN Max 4/day [facility] Holter, DO      ibuprofen  600 mg Oral Q8H PRN HOLLI Lion      influenza vaccine  0.5 mL Intramuscular Once Bora Rosario MD      loratadine  10 mg Oral Daily Brina Guillen MD      melatonin  3 mg Oral HS PRN Bora Rosario MD      melatonin  9 mg Oral HS Bora Rosario MD      methocarbamol  500 mg Oral Q6H PRN HOLLI Lion      multivitamin-minerals  1 tablet Oral Daily Eileen Jensen, HOLLI      nicotine polacrilex  2 mg Oral Q4H PRN Bora Rosario MD      OLANZapine  5 mg Oral Q4H PRN Max 3/day [facility] Holter, DO      Or    OLANZapine  2.5 mg Intramuscular Q4H PRN Max 3/day [facility] Holter, DO      OLANZapine  5 mg Oral Q3H PRN Max 3/day [facility] Holter, DO      Or    OLANZapine  5 mg Intramuscular Q3H PRN Max 3/day [facility] Holter, DO      OLANZapine  2.5 mg Oral Q4H PRN Max 6/day [facility] Holter, DO      ondansetron  4 mg Oral Q6H PRN HOLLI Lion      pantoprazole  20 mg Oral [facility] Cresencioter, DO      polyethylene glycol  17 g Oral Daily PRN HOLLI Ghotra      propranolol  10 mg Oral Q12H KAYLIE HOLLI Lion      senna-docusate sodium  1 tablet Oral Daily PRN [facility] Cresencioter, DO      senna-docusate sodium  2 tablet Oral HS Melvina Ambron, DO      traZODone  50 mg Oral HS PRN HOLLI Lion      white petrolatum-mineral oil   Topical TID PRN HOLLI Lion         Risks, benefits and possible side effects of Medications:   Risks, benefits, and possible side effects of medications explained to patient and patient verbalizes understanding       I have spent a total time of 30 minutes in caring for this  patient on the day of the visit/encounter including Risks and benefits of tx options, Importance of tx compliance, Counseling / Coordination of care, Documenting in the medical record, Reviewing / ordering tests, medicine, procedures  , and Obtaining or reviewing history  .

## 2024-10-19 NOTE — PLAN OF CARE
Problem: Alteration in Thoughts and Perception  Goal: Verbalize thoughts and feelings  Description: Interventions:  - Promote a nonjudgmental and trusting relationship with the patient through active listening and therapeutic communication  - Assess patient's level of functioning, behavior and potential for risk  - Engage patient in 1 on 1 interactions  - Encourage patient to express fears, feelings, frustrations, and discuss symptoms    - [place] patient to reality, help patient recognize reality-based thinking   - Administer medications as ordered and assess for potential side effects  - Provide the patient education related to the signs and symptoms of the illness and desired effects of prescribed medications  Outcome: Progressing  Goal: Agree to be compliant with medication regime, as prescribed and report medication side effects  Description: Interventions:  - Offer appropriate PRN medication and supervise ingestion; conduct AIMS, as needed   Outcome: Progressing  Goal: Attend and participate in unit activities, including therapeutic, recreational, and educational groups  Description: Interventions:  -Encourage Visitation and family involvement in care  Outcome: Progressing  Goal: Complete daily ADLs, including personal hygiene independently, as able  Description: Interventions:  - Observe, teach, and assist patient with ADLS  - Monitor and promote a balance of rest/activity, with adequate nutrition and elimination   Outcome: Progressing     Problem: Ineffective Coping  Goal: Participates in unit activities  Description: Interventions:  - Provide therapeutic environment   - Provide required programming   - Redirect inappropriate behaviors   Outcome: Progressing     Problem: Depression  Goal: Refrain from harming self  Description: Interventions:  - Monitor patient closely, per order   - Supervise medication ingestion, monitor effects and side effects   Outcome: Progressing  Goal: Refrain from  isolation  Description: Interventions:  - Develop a trusting relationship   - Encourage socialization   Outcome: Progressing  Goal: Refrain from self-neglect  Outcome: Progressing     Problem: Alteration in Thoughts and Perception  Goal: Treatment Goal: Gain control of psychotic behaviors/thinking, reduce/eliminate presenting symptoms and demonstrate improved reality functioning upon discharge  Outcome: Not Progressing

## 2024-10-19 NOTE — ASSESSMENT & PLAN NOTE
Reviewed on 10/19/24      No associated orders from this encounter found during lookback period of 72 hours.

## 2024-10-19 NOTE — ASSESSMENT & PLAN NOTE
Reviewed on 10/19/24    Continue senna-docusate sodium two tablets before bed per medical team    No associated orders from this encounter found during lookback period of 72 hours.

## 2024-10-19 NOTE — NURSING NOTE
Pt is visible on the unit and social with select peers. Consumed 100% of dinner, reports appetite is improving. Took medications without incidence. Pt is pleasant and cooperative. Attended 6/11 groups. Reports anxiety, depression, and AH. Pt was using the punching bag as a coping mechanism. Denies SI/HI/VH. No behavioral issues. Pt offers no complaints. Pt is looking forward to seeing his aunt tomorrow for a visit.

## 2024-10-19 NOTE — ASSESSMENT & PLAN NOTE
Reviewed on 10/19/24    Ongoing by medical    No associated orders from this encounter found during lookback period of 72 hours.

## 2024-10-19 NOTE — ASSESSMENT & PLAN NOTE
Reviewed on 10/19/24  Pt is having ongoing AH of the same quality but increased frequency which is increasing his depression and anxiety over the last 2 weeks per his report.    Continue Clozapine 225 mg at bedtime for psychosis -- to consider further careful titration for Sxs depending on response  Continue to obtain CBC with diff, CRP, and CK (weekly on Tues), and a Clozapine level and BNP (every 2 weeks on Tues)    Continue Abilify 30 mg daily for psychosis  Continue escitalopram 20 mg tablet daily for depression and anxiety  Continue senna-docusate sodium two 50 mg tablets daily before bed for constipation  Continue propanolol 10 mg twice daily for anxiety     Pt remains on dual antipsychotic Tx due to failure on monotherapy      ACT team referral was made    No associated orders from this encounter found during lookback period of 72 hours.

## 2024-10-19 NOTE — NURSING NOTE
Patient is pleasant and cooperative , with poor concentration. His behaviors and attitude is suspicious, paranoid and warm and friendly. Patient has bizarre appearance with worried affect. Good medication compliance. Impulsive behaviors not  observed. Reported depression 5/10 and Anxiety 2/4. Denies Si,HI. Patient stated he hears voices's but didn't want to elaborate .

## 2024-10-19 NOTE — NURSING NOTE
Patient had family visit today. Two female family members came to the unit and brought food to patient and spent time with him.

## 2024-10-19 NOTE — ASSESSMENT & PLAN NOTE
Reviewed on 10/19/24    Continue to encourage cessation upon discharge    No associated orders from this encounter found during lookback period of 72 hours.

## 2024-10-20 VITALS
HEART RATE: 92 BPM | SYSTOLIC BLOOD PRESSURE: 145 MMHG | WEIGHT: 239.4 LBS | BODY MASS INDEX: 38.47 KG/M2 | DIASTOLIC BLOOD PRESSURE: 88 MMHG | TEMPERATURE: 98 F | HEIGHT: 66 IN | OXYGEN SATURATION: 99 % | RESPIRATION RATE: 18 BRPM

## 2024-10-20 PROCEDURE — 99232 SBSQ HOSP IP/OBS MODERATE 35: CPT | Performed by: PHYSICIAN ASSISTANT

## 2024-10-20 RX ADMIN — Medication 5 DROP: at 13:18

## 2024-10-20 RX ADMIN — HYDROCHLOROTHIAZIDE 12.5 MG: 12.5 TABLET ORAL at 08:42

## 2024-10-20 RX ADMIN — LORATADINE 10 MG: 10 TABLET ORAL at 08:42

## 2024-10-20 RX ADMIN — MULTIPLE VITAMINS W/ MINERALS TAB 1 TABLET: TAB ORAL at 08:41

## 2024-10-20 RX ADMIN — PANTOPRAZOLE SODIUM 20 MG: 20 TABLET, DELAYED RELEASE ORAL at 08:41

## 2024-10-20 RX ADMIN — NICOTINE POLACRILEX 2 MG: 2 GUM, CHEWING ORAL at 12:59

## 2024-10-20 RX ADMIN — GLYCOPYRROLATE 1 MG: 1 TABLET ORAL at 08:42

## 2024-10-20 RX ADMIN — GLYCOPYRROLATE 1 MG: 1 TABLET ORAL at 14:03

## 2024-10-20 RX ADMIN — GLYCOPYRROLATE 2 MG: 1 TABLET ORAL at 21:05

## 2024-10-20 RX ADMIN — PROPRANOLOL HYDROCHLORIDE 10 MG: 10 TABLET ORAL at 21:04

## 2024-10-20 RX ADMIN — ARIPIPRAZOLE 30 MG: 15 TABLET ORAL at 08:41

## 2024-10-20 RX ADMIN — AMLODIPINE BESYLATE 5 MG: 5 TABLET ORAL at 08:41

## 2024-10-20 RX ADMIN — ESCITALOPRAM OXALATE 20 MG: 10 TABLET, FILM COATED ORAL at 08:42

## 2024-10-20 RX ADMIN — NICOTINE POLACRILEX 2 MG: 2 GUM, CHEWING ORAL at 17:18

## 2024-10-20 RX ADMIN — SENNOSIDES AND DOCUSATE SODIUM 2 TABLET: 8.6; 5 TABLET ORAL at 21:05

## 2024-10-20 RX ADMIN — NICOTINE POLACRILEX 2 MG: 2 GUM, CHEWING ORAL at 21:05

## 2024-10-20 RX ADMIN — POLYETHYLENE GLYCOL 3350 17 G: 17 POWDER, FOR SOLUTION ORAL at 12:59

## 2024-10-20 RX ADMIN — ATROPINE SULFATE 1 DROP: 10 SOLUTION/ DROPS OPHTHALMIC at 21:29

## 2024-10-20 RX ADMIN — CLOZAPINE 225 MG: 25 TABLET ORAL at 21:05

## 2024-10-20 RX ADMIN — MELATONIN TAB 3 MG 9 MG: 3 TAB at 21:05

## 2024-10-20 RX ADMIN — PROPRANOLOL HYDROCHLORIDE 10 MG: 10 TABLET ORAL at 08:42

## 2024-10-20 NOTE — ASSESSMENT & PLAN NOTE
Reviewed on 10/20/24    Continue famotidine 20 mg tablet BID per medical team   Continue glycopyrrolate 1 mg tablet BID AM and afternoon per medical team  Continue glycopyrrolate 2 mg tablet before bed per medical team  Continue pantoprazole EC 20 mg tablet every morning per medical team    No associated orders from this encounter found during lookback period of 72 hours.

## 2024-10-20 NOTE — ASSESSMENT & PLAN NOTE
Reviewed on 10/20/24    Ongoing by medical    No associated orders from this encounter found during lookback period of 72 hours.

## 2024-10-20 NOTE — NURSING NOTE
"Patient approached writer asking for \"something to go to the bathroom. I haven't had one for two days.\" Patient given prn miralax and prune juice.  "

## 2024-10-20 NOTE — ASSESSMENT & PLAN NOTE
Reviewed on 10/20/24      No associated orders from this encounter found during lookback period of 72 hours.

## 2024-10-20 NOTE — PROGRESS NOTES
Progress Note - Behavioral Health   Name: Alberto Berumen 29 y.o. male I MRN: [id number]  Unit/Bed#: [facility] 108-02 I Date of Admission: 3/29/2024   Date of Service: 10/20/2024 I Hospital Day: 205     Assessment & Plan  Schizoaffective disorder, bipolar type (HCC)  Reviewed on 10/20/24  Pt is having ongoing AH of the same quality but increased frequency over the last 2 weeks, which had increasing his depression and anxiety.  NO change in the last 24 hours    Continue Clozapine 225 mg qhs for psychosis -- to consider further careful titration for Sxs depending on response  Continue to obtain CBC with diff, CRP, and CK (weekly on Tues), and a Clozapine level and BNP (every 2 weeks on Tues)    Continue Abilify 30 mg qd for psychosis  Continue Escitalopram 20 mg qd for depression and anxiety  Continue Senna-Docusate sodium two 50 mg tablets daily before bed for constipation  Continue propanolol 10 mg bid for anxiety     Pt remains on dual antipsychotic Tx due to failure on monotherapy      ACT team referral was made    No associated orders from this encounter found during lookback period of 72 hours.    Chronic idiopathic constipation  Reviewed on 10/20/24    Continue senna-docusate sodium two tablets before bed per medical team    No associated orders from this encounter found during lookback period of 72 hours.  GERD (gastroesophageal reflux disease)  Reviewed on 10/20/24    Continue famotidine 20 mg tablet BID per medical team   Continue glycopyrrolate 1 mg tablet BID AM and afternoon per medical team  Continue glycopyrrolate 2 mg tablet before bed per medical team  Continue pantoprazole EC 20 mg tablet every morning per medical team    No associated orders from this encounter found during lookback period of 72 hours.  Medical clearance for psychiatric admission  Reviewed on 10/20/24    Ongoing by medical    No associated orders from this encounter found during lookback period of 72 hours.    Tobacco abuse  Reviewed on  "10/20/24    Continue to encourage cessation upon discharge    No associated orders from this encounter found during lookback period of 72 hours.  Elevated hemoglobin A1c  Reviewed on 10/20/24      No associated orders from this encounter found during lookback period of 72 hours.    Progress Note - Behavioral Health     Alberto Berumen 29 y.o. male MRN: [id number]  Unit/Bed#: [facility] 108-02 Encounter: [id number]      Alberto Berumen was seen for continuing care and reviewed with treatment team.  Pt was in bed on my approach earlier, scant, unengaged with interview and reported feeling \"Alright.\"  On further exploration, he then reported \"Everything's the same\" in terms of his depression, anxiety and hallucinations.  Pt presently denies self-injurious thoughts/behaviors, SI, HI, hallucinations or paranoia.  He reported his sleep and appetite are \"all good\"--to which nursing concurred.       Per EMR and floor team, the Pt has been calm, cooperative, and medication compliant but suspicious.  He ate very well yesterday when he had a visit from his family who brought him Chinese food.  He continues to be visible and social in the milieu, and attends most therapeutic groups (6/9 yesterday) with behavior among peers.      Behavior over the last 24 hours: unchanged.   Sleep:Good  Appetite: Good   Medication side effects: None per Pt    ROS: No complaints per Pt, (All ROS Negative)    Labs/Tests: Reviewed    Mental Status Evaluation:  Appearance:  Dressed in his shirt and hospital bottoms, minimal eye contact   Behavior:  Calm, cooperative at the most minimal level, withdrawn   Speech:  Scant overall but soft with normal rate when he did speak   Mood:  Anxious, Depressed   Affect:  Blunted   Thought Process:  Organized, Goal directed   Associations: Intact associations   Thought Content:  Paranoid delusions   Perceptual Disturbances: +AH, and paranoia, but he denies any other hallucinations.  He does not appear to be RIS " during interview   Risk Potential: Pt presently denies SI or HI    Sensorium:  Self, birthday, Place, Day of the week, Month, Year   Memory:  short term memory grossly intact   Consciousness:  alert, awake   Attention: Attention span appeared shorter than expected for age   Insight:  Limited   Judgment: Fair   Gait/Station: Normal gait/station   Motor Activity: No abnormal movements     Vitals:    10/18/24 2030 10/19/24 0756 10/20/24 0815 10/20/24 1518   BP: 127/75 126/72 126/68 148/88   BP Location: Left arm Right arm Left arm Right arm   Pulse: 99 79 78 95   Resp: 18 18 16 18   Temp:  97.6 °F (36.4 °C) 97.8 °F (36.6 °C) 98 °F (36.7 °C)   TempSrc:  Temporal Temporal Temporal   SpO2: 100% 100% 98% 99%   Weight:       Height:         Labwork: I have personally reviewed all pertinent laboratory/tests results.     Progress Toward Goals:  Based on today's interview and review of prior notes, no change through the weekend    Assessment & Plan   Principal Problem:    Schizoaffective disorder, bipolar type (HCC)  Active Problems:    GERD (gastroesophageal reflux disease)    Medical clearance for psychiatric admission    Tobacco abuse    T wave inversion in EKG    Chronic idiopathic constipation    Confluent and reticulate papillomatosis    Class 2 obesity in adult    Primary hypertension    Elevated hemoglobin A1c    Bilateral lower extremity edema      Recommended Treatment: Continue with pharmacotherapy, group therapy, milieu therapy and occupational therapy.  The patient will be maintained on the following medications:  Current Facility-Administered Medications   Medication Dose Route Frequency Provider Last Rate    acetaminophen  650 mg Oral Q4H PRN Jordan C Holter,       acetaminophen  650 mg Oral Q6H PRN HOLLI Lion      aluminum-magnesium hydroxide-simethicone  30 mL Oral Q4H PRN Jordan C Holter,       amLODIPine  5 mg Oral Daily HOLLI Lion      ARIPiprazole  30 mg Oral Daily Bora Rosario MD       Artificial Tears  1 drop Both Eyes Q3H PRN Jordan C Holter, DO      atropine  1 drop Sublingual HS Harjeet Torres, DO      haloperidol lactate  2.5 mg Intramuscular Q4H PRN Max 4/day HOLLI Lion      And    LORazepam  1 mg Intramuscular Q4H PRN Max 4/day STEVE LionNP      And    benztropine  0.5 mg Intramuscular Q4H PRN Max 4/day HOLLI Lion      benztropine  1 mg Intramuscular Q4H PRN Max 6/day Jordan C Holter, DO      haloperidol lactate  5 mg Intramuscular Q4H PRN Max 4/day STEVE LionNP      And    LORazepam  2 mg Intramuscular Q4H PRN Max 4/day HOLLI Lion      And    benztropine  1 mg Intramuscular Q4H PRN Max 4/day HOLLI Lion      benztropine  1 mg Oral Q4H PRN Max 6/day HOLLI Lion      benztropine  1 mg Oral Q4H PRN Max 6/day Jordan C Holter, DO      bisacodyl  10 mg Rectal Daily PRN HOLLI Lion      calcium carbonate  500 mg Oral BID PRN HOLLI Monge      carbamide peroxide  5 drop Both Ears BID HOLLI Monge      cloZAPine  225 mg Oral HS Hardik So MD      hydrOXYzine HCL  50 mg Oral Q6H PRN Max 4/day Jordan C Holter, DO      Or    diphenhydrAMINE  50 mg Intramuscular Q6H PRN Jordan C Holter, DO      hydrOXYzine HCL  50 mg Oral Q6H PRN Max 4/day HOLLI Lion      Or    diphenhydrAMINE  50 mg Intramuscular Q6H PRN HOLLI Lion      diphenhydrAMINE-zinc acetate   Topical BID PRN HOLLI Lion      escitalopram  20 mg Oral Daily HOLLI Lion      famotidine  20 mg Oral BID PRN HOLLI Monge      fluticasone  1 spray Each Nare Daily PRN HOLLI Monge      glycopyrrolate  1 mg Oral BID (AM & Afternoon) Bora Rosario MD      glycopyrrolate  2 mg Oral HS Bora Rosario MD      haloperidol  1 mg Oral Q6H PRN HOLLI Lion      haloperidol  2.5 mg Oral Q4H PRN Max 4/day HOLLI Lion      haloperidol  5 mg Oral Q4H PRN Max 4/day HOLLI Lion       hydroCHLOROthiazide  12.5 mg Oral Daily HOLLI Galvan      hydrocortisone   Topical 4x Daily PRN HOLLI Lion      hydrOXYzine HCL  100 mg Oral Q6H PRN Max 4/day [facility] Holter, DO      Or    LORazepam  2 mg Intramuscular Q6H PRN [facility] Holter, DO      hydrOXYzine HCL  100 mg Oral Q6H PRN Max 4/day HOLLI Lion      Or    LORazepam  2 mg Intramuscular Q6H PRN HOLLI Lion      hydrOXYzine HCL  25 mg Oral Q6H PRN Max 4/day [facility] Holter, DO      ibuprofen  600 mg Oral Q8H PRN HOLLI Lion      influenza vaccine  0.5 mL Intramuscular Once Bora Rosario MD      loratadine  10 mg Oral Daily Brina Guillen MD      melatonin  3 mg Oral HS PRN Bora Rosario MD      melatonin  9 mg Oral HS Bora Rosario MD      methocarbamol  500 mg Oral Q6H PRN HOLLI Lion      multivitamin-minerals  1 tablet Oral Daily HOLLI Monge      nicotine polacrilex  2 mg Oral Q4H PRN Bora Rosario MD      OLANZapine  5 mg Oral Q4H PRN Max 3/day [facility] Holter, DO      Or    OLANZapine  2.5 mg Intramuscular Q4H PRN Max 3/day [facility] Holter, DO      OLANZapine  5 mg Oral Q3H PRN Max 3/day [facility] Holter, DO      Or    OLANZapine  5 mg Intramuscular Q3H PRN Max 3/day [facility] Holter, DO      OLANZapine  2.5 mg Oral Q4H PRN Max 6/day [facility] Holter, DO      ondansetron  4 mg Oral Q6H PRN HOLLI Lion      pantoprazole  20 mg Oral QAM [facility] Holter, DO      polyethylene glycol  17 g Oral Daily PRN HOLLI Ghotra      propranolol  10 mg Oral Q12H KAYLIE HOLLI Lion      senna-docusate sodium  1 tablet Oral Daily PRN [facility] Holter, DO      senna-docusate sodium  2 tablet Oral HS Melvina Ambron, DO      traZODone  50 mg Oral HS PRN HOLLI Lion      white petrolatum-mineral oil   Topical TID PRN HOLLI Lion         Risks, benefits and possible side effects of Medications:   Risks, benefits, and possible side effects of medications explained to patient  and patient verbalizes understanding       I have spent a total time of 25 minutes in caring for this patient on the day of the visit/encounter including Risks and benefits of tx options, Importance of tx compliance, Documenting in the medical record, Reviewing / ordering tests, medicine, procedures  , Obtaining or reviewing history  , and Communicating with other healthcare professionals .

## 2024-10-20 NOTE — ASSESSMENT & PLAN NOTE
Reviewed on 10/20/24    Continue senna-docusate sodium two tablets before bed per medical team    No associated orders from this encounter found during lookback period of 72 hours.

## 2024-10-20 NOTE — PLAN OF CARE
Problem: Alteration in Thoughts and Perception  Goal: Verbalize thoughts and feelings  Description: Interventions:  - Promote a nonjudgmental and trusting relationship with the patient through active listening and therapeutic communication  - Assess patient's level of functioning, behavior and potential for risk  - Engage patient in 1 on 1 interactions  - Encourage patient to express fears, feelings, frustrations, and discuss symptoms    - [place] patient to reality, help patient recognize reality-based thinking   - Administer medications as ordered and assess for potential side effects  - Provide the patient education related to the signs and symptoms of the illness and desired effects of prescribed medications  Outcome: Progressing  Goal: Agree to be compliant with medication regime, as prescribed and report medication side effects  Description: Interventions:  - Offer appropriate PRN medication and supervise ingestion; conduct AIMS, as needed   Outcome: Progressing  Goal: Attend and participate in unit activities, including therapeutic, recreational, and educational groups  Description: Interventions:  -Encourage Visitation and family involvement in care  Outcome: Progressing     Problem: Ineffective Coping  Goal: Identifies ineffective coping skills  Outcome: Progressing  Goal: Identifies healthy coping skills  Outcome: Progressing  Goal: Demonstrates healthy coping skills  Outcome: Progressing     Problem: Alteration in Orientation  Goal: Interact with staff daily  Description: Interventions:  - Assess and re-assess patient's level of orientation  - Engage patient in 1 on 1 interactions, daily, for a minimum of 15 minutes   - Establish rapport/trust with patient   Outcome: Progressing  Goal: Express concerns related to confused thinking related to:  Description: Interventions:  - Encourage patient to express feelings, fears, frustrations, hopes  - Assign consistent caregivers   - [place]/re-orient patient as  needed  - Allow comfort items, as appropriate  - Provide visual cues, signs, etc.   Outcome: Progressing  Goal: Allow medical examinations, as recommended  Description: Interventions:  - Provide physical/neurological exams and/or referrals, per provider   Outcome: Progressing

## 2024-10-20 NOTE — ASSESSMENT & PLAN NOTE
Reviewed on 10/20/24  Pt is having ongoing AH of the same quality but increased frequency over the last 2 weeks, which had increasing his depression and anxiety.  NO change in the last 24 hours    Continue Clozapine 225 mg qhs for psychosis -- to consider further careful titration for Sxs depending on response  Continue to obtain CBC with diff, CRP, and CK (weekly on Tues), and a Clozapine level and BNP (every 2 weeks on Tues)    Continue Abilify 30 mg qd for psychosis  Continue Escitalopram 20 mg qd for depression and anxiety  Continue Senna-Docusate sodium two 50 mg tablets daily before bed for constipation  Continue propanolol 10 mg bid for anxiety     Pt remains on dual antipsychotic Tx due to failure on monotherapy      ACT team referral was made    No associated orders from this encounter found during lookback period of 72 hours.

## 2024-10-20 NOTE — NURSING NOTE
Alberto maintained on ongoing SAFE precaution without incident on this shift. Receive him in bed with eyes closed, respiration even and unlabored.   Attended and participated in 6 out of 9 groups today. Continues to be compliant with medication regimen.  Denies all psych symptoms

## 2024-10-20 NOTE — NURSING NOTE
Patient is pleasant and cooperative , with poor concentration. His behaviors and attitude is suspicious, warm and friendly. Patient has bizarre appearance with worried affect. Good medication compliance. Impulsive behaviors not  observed. Reported depression 7/10 and Anxiety 2/4. Denies Si,HI. A,V/H. Patient is visible on the unit and social with selected peers.

## 2024-10-20 NOTE — ASSESSMENT & PLAN NOTE
Reviewed on 10/20/24    Continue to encourage cessation upon discharge    No associated orders from this encounter found during lookback period of 72 hours.

## 2024-10-21 PROCEDURE — 99232 SBSQ HOSP IP/OBS MODERATE 35: CPT | Performed by: PSYCHIATRY & NEUROLOGY

## 2024-10-21 RX ADMIN — NICOTINE POLACRILEX 2 MG: 2 GUM, CHEWING ORAL at 12:57

## 2024-10-21 RX ADMIN — PROPRANOLOL HYDROCHLORIDE 10 MG: 10 TABLET ORAL at 08:51

## 2024-10-21 RX ADMIN — MULTIPLE VITAMINS W/ MINERALS TAB 1 TABLET: TAB ORAL at 09:02

## 2024-10-21 RX ADMIN — LORATADINE 10 MG: 10 TABLET ORAL at 08:52

## 2024-10-21 RX ADMIN — PROPRANOLOL HYDROCHLORIDE 10 MG: 10 TABLET ORAL at 21:25

## 2024-10-21 RX ADMIN — SENNOSIDES AND DOCUSATE SODIUM 2 TABLET: 8.6; 5 TABLET ORAL at 21:25

## 2024-10-21 RX ADMIN — NICOTINE POLACRILEX 2 MG: 2 GUM, CHEWING ORAL at 17:09

## 2024-10-21 RX ADMIN — NICOTINE POLACRILEX 2 MG: 2 GUM, CHEWING ORAL at 21:26

## 2024-10-21 RX ADMIN — ARIPIPRAZOLE 30 MG: 15 TABLET ORAL at 08:52

## 2024-10-21 RX ADMIN — Medication 5 DROP: at 09:39

## 2024-10-21 RX ADMIN — PANTOPRAZOLE SODIUM 20 MG: 20 TABLET, DELAYED RELEASE ORAL at 08:51

## 2024-10-21 RX ADMIN — GLYCOPYRROLATE 2 MG: 1 TABLET ORAL at 21:25

## 2024-10-21 RX ADMIN — GLYCOPYRROLATE 1 MG: 1 TABLET ORAL at 08:50

## 2024-10-21 RX ADMIN — ESCITALOPRAM OXALATE 20 MG: 10 TABLET, FILM COATED ORAL at 08:51

## 2024-10-21 RX ADMIN — ATROPINE SULFATE 1 DROP: 10 SOLUTION/ DROPS OPHTHALMIC at 21:24

## 2024-10-21 RX ADMIN — Medication 1 SPRAY: at 08:52

## 2024-10-21 RX ADMIN — HYDROCHLOROTHIAZIDE 12.5 MG: 12.5 TABLET ORAL at 08:51

## 2024-10-21 RX ADMIN — ONDANSETRON 4 MG: 4 TABLET, ORALLY DISINTEGRATING ORAL at 12:56

## 2024-10-21 RX ADMIN — CLOZAPINE 225 MG: 25 TABLET ORAL at 21:25

## 2024-10-21 RX ADMIN — NICOTINE POLACRILEX 2 MG: 2 GUM, CHEWING ORAL at 08:52

## 2024-10-21 RX ADMIN — GLYCOPYRROLATE 1 MG: 1 TABLET ORAL at 15:09

## 2024-10-21 RX ADMIN — MELATONIN TAB 3 MG 9 MG: 3 TAB at 21:25

## 2024-10-21 NOTE — ASSESSMENT & PLAN NOTE
Reviewed on 10/21/24    Continue famotidine 20 mg tablet BID per medical team   Continue glycopyrrolate 1 mg tablet BID AM and afternoon per medical team  Continue glycopyrrolate 2 mg tablet before bed per medical team  Continue pantoprazole EC 20 mg tablet every morning per medical team    No associated orders from this encounter found during lookback period of 72 hours.

## 2024-10-21 NOTE — ASSESSMENT & PLAN NOTE
Reviewed on 10/21/24    Pt continues to endorse AH that tell him to harm himself. He denies symptoms consistent with VH at this time. He states that he is feeling depressed, sad, anxious, and fearful, particularly in regards to the quality of voices that he is hearing. He denies symptoms consistent with grandiose delusions. He does not appear to be responding to internal stimuli at the time of this interview.     Continue Clozapine 225 mg qhs for psychosis -- to consider further careful titration for Sxs depending on response  Continue to obtain CBC with diff, CRP, and CK (weekly on Tues), and a Clozapine level and BNP (every 2 weeks on Tues)    Continue Abilify 30 mg qd for psychosis  Continue Escitalopram 20 mg qd for depression and anxiety  Continue Senna-Docusate sodium two 50 mg tablets daily before bed for constipation  Continue propanolol 10 mg bid for anxiety     Pt remains on dual antipsychotic Tx due to failure on monotherapy      ACT team referral was made    No associated orders from this encounter found during lookback period of 72 hours.

## 2024-10-21 NOTE — ASSESSMENT & PLAN NOTE
Reviewed on 10/21/24    Ongoing by medical    No associated orders from this encounter found during lookback period of 72 hours.

## 2024-10-21 NOTE — NURSING NOTE
"Alberto was calm and cooperative during assessment. He denies SH, SH, HI, and VH, and states he continues to hear AH stating it will \"kill him and his family.\" He states he walks and talks to peers to distract himself. He is social with select peers in the milieu and attends group programming and meals in the dining room. He was compliant with medications. He denies new concerns at this time.   "

## 2024-10-21 NOTE — PLAN OF CARE
Problem: Alteration in Thoughts and Perception  Goal: Treatment Goal: Gain control of psychotic behaviors/thinking, reduce/eliminate presenting symptoms and demonstrate improved reality functioning upon discharge  Outcome: Not Progressing  Goal: Recognize dysfunctional thoughts, communicate reality-based thoughts at the time of discharge  Description: Interventions:  - Provide medication and psycho-education to assist patient in compliance and developing insight into his/her illness   Outcome: Not Progressing     Problem: Alteration in Thoughts and Perception  Goal: Verbalize thoughts and feelings  Description: Interventions:  - Promote a nonjudgmental and trusting relationship with the patient through active listening and therapeutic communication  - Assess patient's level of functioning, behavior and potential for risk  - Engage patient in 1 on 1 interactions  - Encourage patient to express fears, feelings, frustrations, and discuss symptoms    - [place] patient to reality, help patient recognize reality-based thinking   - Administer medications as ordered and assess for potential side effects  - Provide the patient education related to the signs and symptoms of the illness and desired effects of prescribed medications  Outcome: Progressing  Goal: Agree to be compliant with medication regime, as prescribed and report medication side effects  Description: Interventions:  - Offer appropriate PRN medication and supervise ingestion; conduct AIMS, as needed   Outcome: Progressing  Goal: Attend and participate in unit activities, including therapeutic, recreational, and educational groups  Description: Interventions:  -Encourage Visitation and family involvement in care  Outcome: Progressing  Goal: Complete daily ADLs, including personal hygiene independently, as able  Description: Interventions:  - Observe, teach, and assist patient with ADLS  - Monitor and promote a balance of rest/activity, with adequate nutrition  and elimination   Outcome: Progressing     Problem: Ineffective Coping  Goal: Identifies ineffective coping skills  Outcome: Progressing

## 2024-10-21 NOTE — NURSING NOTE
Alberto maintained on ongoing SAFE precaution without incident on this shift. Receive him in bed with eyes closed, respiration even and unlabored.   Attended and participated in 3 out of 7 groups today. Continues to be compliant with medication regimen.  Denies all psych symptoms

## 2024-10-21 NOTE — ASSESSMENT & PLAN NOTE
Reviewed on 10/21/24    Continue to encourage cessation upon discharge    No associated orders from this encounter found during lookback period of 72 hours.

## 2024-10-21 NOTE — NURSING NOTE
Alberto maintain on ongoing SAFE precaution without incident on this shift. Observed resting in bed, respiration even and unlabored. Continuous Q 15 minutes check implemented thru the night. No behavioral noted

## 2024-10-21 NOTE — ASSESSMENT & PLAN NOTE
Reviewed on 10/21/24    Continue senna-docusate sodium two tablets before bed per medical team    No associated orders from this encounter found during lookback period of 72 hours.

## 2024-10-21 NOTE — ASSESSMENT & PLAN NOTE
Reviewed on 10/21/24      No associated orders from this encounter found during lookback period of 72 hours.

## 2024-10-21 NOTE — PROGRESS NOTES
Progress Note - Behavioral Health   Name: Alberto Berumen 29 y.o. male I MRN: [id number]  Unit/Bed#: [facility] 108-02 I Date of Admission: 3/29/2024   Date of Service: 10/21/2024 I Hospital Day: 206     Assessment & Plan  Schizoaffective disorder, bipolar type (HCC)  Reviewed on 10/21/24    Pt continues to endorse AH that tell him to harm himself. He denies symptoms consistent with VH at this time. He states that he is feeling depressed, sad, anxious, and fearful, particularly in regards to the quality of voices that he is hearing. He denies symptoms consistent with grandiose delusions. He does not appear to be responding to internal stimuli at the time of this interview.     Continue Clozapine 225 mg qhs for psychosis -- to consider further careful titration for Sxs depending on response  Continue to obtain CBC with diff, CRP, and CK (weekly on Tues), and a Clozapine level and BNP (every 2 weeks on Tues)    Continue Abilify 30 mg qd for psychosis  Continue Escitalopram 20 mg qd for depression and anxiety  Continue Senna-Docusate sodium two 50 mg tablets daily before bed for constipation  Continue propanolol 10 mg bid for anxiety     Pt remains on dual antipsychotic Tx due to failure on monotherapy      [facility] team referral was made    No associated orders from this encounter found during lookback period of 72 hours.    Chronic idiopathic constipation  Reviewed on 10/21/24    Continue senna-docusate sodium two tablets before bed per medical team    No associated orders from this encounter found during lookback period of 72 hours.  GERD (gastroesophageal reflux disease)  Reviewed on 10/21/24    Continue famotidine 20 mg tablet BID per medical team   Continue glycopyrrolate 1 mg tablet BID AM and afternoon per medical team  Continue glycopyrrolate 2 mg tablet before bed per medical team  Continue pantoprazole EC 20 mg tablet every morning per medical team    No associated orders from this encounter found during lookback  "period of 72 hours.  Medical clearance for psychiatric admission  Reviewed on 10/21/24    Ongoing by medical    No associated orders from this encounter found during lookback period of 72 hours.    Tobacco abuse  Reviewed on 10/21/24    Continue to encourage cessation upon discharge    No associated orders from this encounter found during lookback period of 72 hours.  Elevated hemoglobin A1c  Reviewed on 10/21/24      No associated orders from this encounter found during lookback period of 72 hours.      [facility]  Psychiatric Progress Note - Department of Behavioral Health   Alberto Berumen 29 y.o. male MRN: [id number]  Unit/Bed#: [facility] 108-02 Encounter: [id number]    SUBJECTIVE     Patient evaluated this a.m. for continuity of care. Patient was discussed at length with nursing and treatment team. Per nursing, Alberto's appetite has improved throughout the weekend. They note that he had a positive visit with his aunt on Saturday, and has a visit from his sister planned for Tuesday. He received Miralax 17g at 12:59 on 10/20, which was noted to have been effective. He attended 6 out of 11 groups on Friday.      During evaluation, no acute distress is noted. Alberto Berumen notes that he continues to feel depressed, sad, and anxious, but notes that he is feeling slightly improved this morning than he was yesterday. He brightens when asked about his aunt's visit over the weekend, commenting that it was \"dope\" and that he enjoyed the chinese food that she brought. He further expresses excitement when mentioning that his sister will be visiting Tuesday. Patient endorses auditory hallucinations that tell him to kill himself, noting associated fear and anxiety. He states that he has had suicidal ideations before, but denies any desire to harm himself at the time of interview. He denies symptoms consistent with visual hallucinations. He notes that he has been eating well and that he was able to " "sleep well last night. He does not appear to be responding to internal stimuli at the time of the interview.      PSYCHIATRIC REVIEW OF SYSTEMS     Behavior over the last 24 hours:  unchanged  Sleep: normal  Appetite: normal  Medication side effects: No    Progress Toward Goals: Patient continues to cooperate with treatment regimen, as evidenced by their participation in individual, social and therapeutic milieu (6 out of 11 groups on Friday) in addition to adherence to his medication regimen.  Patient Acceptance: Patient is accepting and cooperative with treatment regimen and plan.  Patient Understanding: Patient is understanding of his current treatment plan.   Patient Involvement in Reintegration Process: Patient continues to be in contact with his family, receiving a visit from his aunt on Saturday and his sister tomorrow. He is cooperative with his treatment regimen.   Patient's Therapeutic Relationship with Psychiatrist: Trusting  Patient's Optimism Regarding Recovery: Patient continues to adhere to plan, as well as communicate with his family.  Group Attendance: Patient attended 6 out of 11 groups yesterday.     REVIEW OF SYSTEMS   Review of systems: no complaints    OBJECTIVE     Vital Signs in Past 24 Hours:  Temp:  [96.7 °F (35.9 °C)-98 °F (36.7 °C)] 96.7 °F (35.9 °C)  HR:  [70-95] 70  BP: (115-148)/(72-88) 115/72  Resp:  [16-18] 16  SpO2:  [98 %-99 %] 98 %  O2 Device: None (Room air)    Intake/Output in Past 24 hours:  No intake/output data recorded.  No intake/output data recorded.        Laboratory Results:  I have personally reviewed all pertinent laboratory/tests results.  Labs in last 72 hours: No results for input(s): \"WBC\", \"RBC\", \"HGB\", \"HCT\", \"PLT\", \"RDW\", \"TOTANEUTABS\", \"NEUTROABS\", \"SODIUM\", \"K\", \"CL\", \"CO2\", \"BUN\", \"CREATININE\", \"GLUC\", \"GLUF\", \"CALCIUM\", \"AST\", \"ALT\", \"ALKPHOS\", \"TP\", \"ALB\", \"TBILI\", \"CHOLESTEROL\", \"HDL\", \"TRIG\", \"LDLCALC\", \"VALPROICTOT\", \"CARBAMAZEPIN\", \"LITHIUM\", \"AMMONIA\", " "\"[id number]\", \"FREET4\", \"T3FREE\", \"PREGTESTUR\", \"PREGSERUM\", \"HCG\", \"HCGQUANT\", \"RPR\" in the last 72 hours.    Behavioral Health Medications: all current active meds have been reviewed.    Current Facility-Administered Medications   Medication Dose Route Frequency Provider Last Rate    acetaminophen  650 mg Oral Q4H PRN Jordan C Holter, DO      acetaminophen  650 mg Oral Q6H PRN HOLLI Lion      aluminum-magnesium hydroxide-simethicone  30 mL Oral Q4H PRN Jordan C Holter, DO      amLODIPine  5 mg Oral Daily HOLLI Lion      ARIPiprazole  30 mg Oral Daily Bora Rosario MD      Artificial Tears  1 drop Both Eyes Q3H PRN Jordan C Holter,       atropine  1 drop Sublingual HS Harjeet Torres DO      haloperidol lactate  2.5 mg Intramuscular Q4H PRN Max 4/day HOLLI Lion      And    LORazepam  1 mg Intramuscular Q4H PRN Max 4/day HOLLI Lion      And    benztropine  0.5 mg Intramuscular Q4H PRN Max 4/day HOLLI Lion      benztropine  1 mg Intramuscular Q4H PRN Max 6/day Jordan C Holter, DO      haloperidol lactate  5 mg Intramuscular Q4H PRN Max 4/day HOLLI Lion      And    LORazepam  2 mg Intramuscular Q4H PRN Max 4/day HOLLI Lion      And    benztropine  1 mg Intramuscular Q4H PRN Max 4/day HOLLI Lion      benztropine  1 mg Oral Q4H PRN Max 6/day HOLLI Lion      benztropine  1 mg Oral Q4H PRN Max 6/day Jordan C Holter, DO      bisacodyl  10 mg Rectal Daily PRN HOLLI Lion      calcium carbonate  500 mg Oral BID PRN HOLLI Monge      carbamide peroxide  5 drop Both Ears BID HOLLI Monge      cloZAPine  225 mg Oral HS Hardik So MD      hydrOXYzine HCL  50 mg Oral Q6H PRN Max 4/day Jordan C Holter, DO      Or    diphenhydrAMINE  50 mg Intramuscular Q6H PRN Jordan C Holter, DO      hydrOXYzine HCL  50 mg Oral Q6H PRN Max 4/day HOLLI Lion      Or    diphenhydrAMINE  50 mg Intramuscular Q6H " PRN Eveline Hunt, HOLLI      diphenhydrAMINE-zinc acetate   Topical BID PRN Eveline Gilbert, HOLLI      escitalopram  20 mg Oral Daily HOLLI Lion      famotidine  20 mg Oral BID PRN Eileen HOLLI Higuera      fluticasone  1 spray Each Nare Daily PRN EileenHOLLI Cummins      glycopyrrolate  1 mg Oral BID (AM & Afternoon) Bora Rosario MD      glycopyrrolate  2 mg Oral HS Bora Rosario MD      haloperidol  1 mg Oral Q6H PRN HOLLI Lion      haloperidol  2.5 mg Oral Q4H PRN Max 4/day Eveline Hunt, HOLLI      haloperidol  5 mg Oral Q4H PRN Max 4/day HOLLI Lion      hydroCHLOROthiazide  12.5 mg Oral Daily Pia Mantilla, HOLLI      hydrocortisone   Topical 4x Daily PRN HOLLI Lion      hydrOXYzine HCL  100 mg Oral Q6H PRN Max 4/day Ion C Holter, DO      Or    LORazepam  2 mg Intramuscular Q6H PRN Ion C Holter, DO      hydrOXYzine HCL  100 mg Oral Q6H PRN Max 4/day HOLLI Lion      Or    LORazepam  2 mg Intramuscular Q6H PRN HOLLI Lion      hydrOXYzine HCL  25 mg Oral Q6H PRN Max 4/day Ion C Holter, DO      ibuprofen  600 mg Oral Q8H PRN HOLLI Lion      influenza vaccine  0.5 mL Intramuscular Once Bora Rosario MD      loratadine  10 mg Oral Daily Brina Guillen MD      melatonin  3 mg Oral HS PRN Bora Rosario MD      melatonin  9 mg Oral HS Bora Rosario MD      methocarbamol  500 mg Oral Q6H PRN HOLLI Lion      multivitamin-minerals  1 tablet Oral Daily Eileenaddie Jensen, HOLLI      nicotine polacrilex  2 mg Oral Q4H PRN Bora Rosario MD      OLANZapine  5 mg Oral Q4H PRN Max 3/day Ion C Holter, DO      Or    OLANZapine  2.5 mg Intramuscular Q4H PRN Max 3/day Ion C Holter, DO      OLANZapine  5 mg Oral Q3H PRN Max 3/day Ion C Holter, DO      Or    OLANZapine  5 mg Intramuscular Q3H PRN Max 3/day Ion C Holter, DO      OLANZapine  2.5 mg Oral Q4H PRN Max 6/day Jordan C Holter, DO      ondansetron  4 mg Oral Q6H PRN Eveline  "HOLLI Hunt      pantoprazole  20 mg Oral QAM Jordan C Holter, DO      polyethylene glycol  17 g Oral Daily PRN HOLLI Ghotra      propranolol  10 mg Oral Q12H Count includes the [facility] HOLLI Lion      senna-docusate sodium  1 tablet Oral Daily PRN Jordan C Holter,       senna-docusate sodium  2 tablet Oral HS Melvina De Jesus, DO      traZODone  50 mg Oral HS PRN HOLLI Lion      white petrolatum-mineral oil   Topical TID PRN HOLLI Lion         Risks, benefits and possible side effects of Medications:   Risks, benefits, and possible side effects of medications explained to patient and patient verbalizes understanding.      Dual Antipsychotic Rationale: Patient on clozapine and aripiprazole for proper control of symptoms.      Mental Status Evaluation:  Appearance:  Patient is a dark-skinned male with long dark hair. He appears appropriate, appears stated age, and in no acute distress. Patient is wearing a gray hospital gown with light green hospital pants.   Behavior:  Patient is calm, pleasant, and willing to converse. He does not maintain consistent eye contact, but there are no stereotyped behaviors noted.    Speech:  normal rate, normal volume, normal pitch, fluent, clear, coherent   Mood:  \"Depressed, sad, anxious\"   Affect:  Affect is sad, mood-congruent, constricted, and stable throughout interview.    Thought Process:  organized, logical, coherent, goal directed, linear, normal rate of thoughts   Associations: intact associations   Thought Content:  Patient denies SI and HI. Patient expresses paranoia and fear over the voices that he hears that tell him to kill himself. He denies symptoms consistent with grandiose delusions.    Perceptual Disturbances: Patient endorses auditory hallucinations, but denies symptoms consistent with visual hallucinations. He does not appear to be responding to internal stimuli at this time.    Risk Potential: Suicidal ideation - denies at time of interview  Homicidal " ideation - denies  Potential for aggression - Not at present   Sensorium:  oriented to person, place, and time/date   Memory:  recent and remote memory grossly intact   Consciousness:  alert and awake   Attention/Concentration: attention span and concentration are age appropriate   Insight:  limited   Judgment: fair   Gait/Station: normal gait/station   Motor Activity: no abnormal movements     Recommended Treatment:   See above for assessment and plan.    Counseling/Coordination of Care    Total unit time spent today was greater than 15 minutes. Greater than 50% of total time was spent with the patient and/or patient's relatives and/or coordination of patient's care.     Patient's rights, confidentiality, exceptions to confidentiality, use of electronic medical record including appropriate staff access to medical record regarding behavioral health services and consent to treatment were reviewed.    Note Share:     This note was not shared with the patient due to reasonable likelihood of causing patient harm     Yamila Lopes   Psychiatry, PA-S

## 2024-10-21 NOTE — PROGRESS NOTES
10/21/24 0818   Team Meeting   Meeting Type Daily Rounds   Team Members Present   Team Members Present Physician;Nurse;;Other (Discipline and Name)   Physician Team Member Holter, Thomas   Nursing Team Member Claudia   Social Work Team Member Jose J ORTEGA   Other (Discipline and Name) [facility]   Patient/Family Present   Patient Present No   Patient's Family Present No   OTHER   Team Meeting - Additional Comments [facility]     Groups Participation  6/11.   Patient's compliant with medications. He's pleasant and cooperative. He's engaged in his treatment. Sisters visit on 10/22. Pending discharge home when [facility] opens his case. Had birthday visit with his Aunt this weekend. Improved appetite.

## 2024-10-22 LAB
BASOPHILS # BLD AUTO: 0.05 THOUSANDS/ÂΜL (ref 0–0.1)
BASOPHILS NFR BLD AUTO: 1 % (ref 0–1)
CLOZAPINE SERPL-MCNC: 368 NG/ML (ref 350–900)
CRP SERPL QL: 7.9 MG/L
EOSINOPHIL # BLD AUTO: 0.32 THOUSAND/ÂΜL (ref 0–0.61)
EOSINOPHIL NFR BLD AUTO: 4 % (ref 0–6)
ERYTHROCYTE [DISTWIDTH] IN BLOOD BY AUTOMATED COUNT: 14.3 % (ref 11.6–15.1)
HCT VFR BLD AUTO: 43.3 % (ref 36.5–49.3)
HGB BLD-MCNC: 13.5 G/DL (ref 12–17)
IMM GRANULOCYTES # BLD AUTO: 0.02 THOUSAND/UL (ref 0–0.2)
IMM GRANULOCYTES NFR BLD AUTO: 0 % (ref 0–2)
LYMPHOCYTES # BLD AUTO: 2.57 THOUSANDS/ÂΜL (ref 0.6–4.47)
LYMPHOCYTES NFR BLD AUTO: 32 % (ref 14–44)
MCH RBC QN AUTO: 23.6 PG (ref 26.8–34.3)
MCHC RBC AUTO-ENTMCNC: 31.2 G/DL (ref 31.4–37.4)
MCV RBC AUTO: 76 FL (ref 82–98)
MONOCYTES # BLD AUTO: 0.67 THOUSAND/ÂΜL (ref 0.17–1.22)
MONOCYTES NFR BLD AUTO: 8 % (ref 4–12)
NEUTROPHILS # BLD AUTO: 4.33 THOUSANDS/ÂΜL (ref 1.85–7.62)
NEUTS SEG NFR BLD AUTO: 55 % (ref 43–75)
NRBC BLD AUTO-RTO: 0 /100 WBCS
PLATELET # BLD AUTO: 285 THOUSANDS/UL (ref 149–390)
PMV BLD AUTO: 10.3 FL (ref 8.9–12.7)
RBC # BLD AUTO: 5.71 MILLION/UL (ref 3.88–5.62)
WBC # BLD AUTO: 7.96 THOUSAND/UL (ref 4.31–10.16)

## 2024-10-22 PROCEDURE — 82553 CREATINE MB FRACTION: CPT

## 2024-10-22 PROCEDURE — 86140 C-REACTIVE PROTEIN: CPT

## 2024-10-22 PROCEDURE — 80159 DRUG ASSAY CLOZAPINE: CPT | Performed by: PSYCHIATRY & NEUROLOGY

## 2024-10-22 PROCEDURE — 99232 SBSQ HOSP IP/OBS MODERATE 35: CPT | Performed by: PSYCHIATRY & NEUROLOGY

## 2024-10-22 PROCEDURE — 85025 COMPLETE CBC W/AUTO DIFF WBC: CPT

## 2024-10-22 PROCEDURE — 82550 ASSAY OF CK (CPK): CPT

## 2024-10-22 RX ADMIN — PANTOPRAZOLE SODIUM 20 MG: 20 TABLET, DELAYED RELEASE ORAL at 08:47

## 2024-10-22 RX ADMIN — Medication 1 SPRAY: at 08:46

## 2024-10-22 RX ADMIN — NICOTINE POLACRILEX 2 MG: 2 GUM, CHEWING ORAL at 21:04

## 2024-10-22 RX ADMIN — CLOZAPINE 225 MG: 25 TABLET ORAL at 21:04

## 2024-10-22 RX ADMIN — MELATONIN TAB 3 MG 9 MG: 3 TAB at 21:03

## 2024-10-22 RX ADMIN — SENNOSIDES AND DOCUSATE SODIUM 2 TABLET: 8.6; 5 TABLET ORAL at 21:03

## 2024-10-22 RX ADMIN — GLYCOPYRROLATE 1 MG: 1 TABLET ORAL at 08:47

## 2024-10-22 RX ADMIN — MULTIPLE VITAMINS W/ MINERALS TAB 1 TABLET: TAB ORAL at 08:47

## 2024-10-22 RX ADMIN — Medication: at 21:03

## 2024-10-22 RX ADMIN — LORATADINE 10 MG: 10 TABLET ORAL at 08:48

## 2024-10-22 RX ADMIN — Medication: at 08:46

## 2024-10-22 RX ADMIN — NICOTINE POLACRILEX 2 MG: 2 GUM, CHEWING ORAL at 13:13

## 2024-10-22 RX ADMIN — ESCITALOPRAM OXALATE 20 MG: 10 TABLET, FILM COATED ORAL at 08:48

## 2024-10-22 RX ADMIN — Medication 5 DROP: at 17:20

## 2024-10-22 RX ADMIN — PROPRANOLOL HYDROCHLORIDE 10 MG: 10 TABLET ORAL at 08:48

## 2024-10-22 RX ADMIN — AMLODIPINE BESYLATE 5 MG: 5 TABLET ORAL at 08:47

## 2024-10-22 RX ADMIN — NICOTINE POLACRILEX 2 MG: 2 GUM, CHEWING ORAL at 17:13

## 2024-10-22 RX ADMIN — NICOTINE POLACRILEX 2 MG: 2 GUM, CHEWING ORAL at 08:47

## 2024-10-22 RX ADMIN — HYDROCHLOROTHIAZIDE 12.5 MG: 12.5 TABLET ORAL at 08:47

## 2024-10-22 RX ADMIN — GLYCOPYRROLATE 1 MG: 1 TABLET ORAL at 13:13

## 2024-10-22 RX ADMIN — ARIPIPRAZOLE 30 MG: 15 TABLET ORAL at 08:47

## 2024-10-22 RX ADMIN — GLYCOPYRROLATE 2 MG: 1 TABLET ORAL at 21:04

## 2024-10-22 RX ADMIN — PROPRANOLOL HYDROCHLORIDE 10 MG: 10 TABLET ORAL at 21:04

## 2024-10-22 RX ADMIN — ATROPINE SULFATE 1 DROP: 10 SOLUTION/ DROPS OPHTHALMIC at 21:03

## 2024-10-22 NOTE — PROGRESS NOTES
10/22/24 0808   Team Meeting   Meeting Type Daily Rounds   Team Members Present   Team Members Present Physician;Nurse;;Other (Discipline and Name)   Physician Team Member Holter, Thomas   Nursing Team Member Claudia   Social Work Team Member Jose J ORTEGA   Other (Discipline and Name) [facility]   Patient/Family Present   Patient Present No   Patient's Family Present No   OTHER   Team Meeting - Additional Comments [facility]     Groups Participation  6/10.   Patient's compliant with medications. Patient's appropriate ane engaged in his treatment. He's depressed at times. Patient's sister will be here for a visit with patient today.

## 2024-10-22 NOTE — SOCIAL WORK
This writer met with patient for an individual session. Patient had a family visit with his siblings, which he states went well.   Patient discussed his concern regarding his weight. This writer discussed health eating, portion control and exercise.    Spine appears normal, range of motion is not limited, no muscle or joint tenderness

## 2024-10-22 NOTE — NURSING NOTE
"Pt is accepting of medications without incidence and meal compliant. Pt has been visible in the milieu most of shift, social and bright on approach. Pt remains reporting depression and anxiety, but states they're \" okay at the moment\". AH remains \"the same\", but denies all other s/s. Pt attends groups and offers no new concerns.   "

## 2024-10-22 NOTE — NURSING NOTE
Alberto reported feeling depressed and anxious today.  Reported that he heard voices today.  They are still telling him that they are going to kill him and his family.    Pt is visible, social, pleasant and cooperative.  Meal and med compliant, consumed 100% of lunch.  No breakfast or dinner.  Drank Ensure for dinner.  Pt refused the Debrox ear drops.  He mentioned that he only wants them one time a day because they affect his hearing.    Requested gum throughout the shift.  No unmet needs.  No behavioral issues noted or reported.

## 2024-10-22 NOTE — PROGRESS NOTES
"Progress Note - Behavioral Health   Name: Alberto Berumen 29 y.o. male I MRN: [id number]  Unit/Bed#: [facility] 108-02 I Date of Admission: 3/29/2024   Date of Service: 10/22/2024 I Hospital Day: 207     Assessment & Plan  Schizoaffective disorder, bipolar type (HCC)  Reviewed on 10/22/24    Pt continues to endorse AH that tell him to harm himself. He denies symptoms consistent with VH at this time. He notes that his feelings of depression and anxiety are slightly worse today. He notes a poor appetite, but \"decent\" sleep, stating that he woke up twice throughout the night. He denies symptoms consistent with grandiose delusions. He does not appear to be responding to internal stimuli at the time of this interview.     Continue Clozapine 225 mg qhs for psychosis -- to consider further careful titration for Sxs depending on response  Continue to obtain CBC with diff, CRP, and CK (weekly on Tues), and a Clozapine level and BNP (every 2 weeks on Tues)    Continue Abilify 30 mg qd for psychosis  Continue Escitalopram 20 mg qd for depression and anxiety  Continue Senna-Docusate sodium two 50 mg tablets daily before bed for constipation  Continue propanolol 10 mg bid for anxiety     Pt remains on dual antipsychotic Tx due to failure on monotherapy      ACT team referral was made    No associated orders from this encounter found during lookback period of 72 hours.    Chronic idiopathic constipation  Reviewed on 10/22/24    Continue senna-docusate sodium two tablets before bed per medical team    No associated orders from this encounter found during lookback period of 72 hours.  GERD (gastroesophageal reflux disease)  Reviewed on 10/22/24    Continue famotidine 20 mg tablet BID per medical team   Continue glycopyrrolate 1 mg tablet BID AM and afternoon per medical team  Continue glycopyrrolate 2 mg tablet before bed per medical team  Continue pantoprazole EC 20 mg tablet every morning per medical team    No associated orders from " "this encounter found during lookback period of 72 hours.  Medical clearance for psychiatric admission  Reviewed on 10/22/24    Ongoing by medical    No associated orders from this encounter found during lookback period of 72 hours.    Tobacco abuse  Reviewed on 10/22/24    Continue to encourage cessation upon discharge    No associated orders from this encounter found during lookback period of 72 hours.  Elevated hemoglobin A1c  Reviewed on 10/22/24      No associated orders from this encounter found during lookback period of 72 hours.      [facility]  Psychiatric Progress Note - Department of Behavioral Health   Alberto Berumen 29 y.o. male MRN: [id number]  Unit/Bed#: [facility] 108-02 Encounter: [id number]    SUBJECTIVE     Patient evaluated this a.m. for continuity of care. Patient was discussed at length with nursing and treatment team. Per nursing, patient has had a poor appetite, eating only an ensure yesterday. Nursing reports persistent auditory hallucinations. His sisters are coming for a birthday visit later this morning, and he will be getting labs drawn this afternoon. He attended 6 out of 10 groups yesterday.     During evaluation, no acute distress is noted. Alberto Berumen is calm and pleasant this morning. He notes that his depression and anxiety are \"slightly worse\" today than yesterday. He continues to endorse auditory hallucinations, noting that they are voices that encourage self-harm. Patient denies desire for self-harm at the time of this interview. He otherwise denies symptoms consistent with visual hallucinations, or grandiose delusions. Patient notes that he has not been hungry recently, but that he is looking forward to [place] food that his sisters are bringing to their visit this afternoon. He states that he got \"decent\" sleep last night, during which he awoke twice. He does not appear to be responding to internal stimuli at the time of this interview. "       PSYCHIATRIC REVIEW OF SYSTEMS     Behavior over the last 24 hours:  unchanged  Sleep: normal  Appetite: normal  Medication side effects: No    Progress Toward Goals: Patient is working positively towards goals, as evidenced by his participation in individual, social and therapeutic milieu in addition to adherence to his medication regimen.  Patient Acceptance: Patient is accepting of his treatment regimen, and continues to cooperate.   Patient Understanding: Patient continues to cooperate with his treatment regimen, and is honest about the hallucination symptoms that he is experiencing.   Patient Involvement in Reintegration Process: Patient continues to be in positive contact with his family, and will have a visit from his sisters today.   Patient's Therapeutic Relationship with Psychiatrist: Trusting  Patient's Optimism Regarding Recovery: Patient continues to adhere to treatment regimen and attend groups, in hopes for eventual recovery.   Group Attendance: Patient attended 6 out of 10 groups yesterday.     REVIEW OF SYSTEMS   Review of systems: no complaints    OBJECTIVE     Vital Signs in Past 24 Hours:  Temp:  [97.4 °F (36.3 °C)-98.3 °F (36.8 °C)] 97.4 °F (36.3 °C)  HR:  [] 96  BP: (121-148)/(71-75) 127/75  Resp:  [18] 18  SpO2:  [97 %-99 %] 99 %  O2 Device: None (Room air)    Intake/Output in Past 24 hours:  No intake/output data recorded.  No intake/output data recorded.        Laboratory Results:  I have personally reviewed all pertinent laboratory/tests results.  Labs in last 72 hours:   Recent Labs     10/22/24  0933   WBC 7.96   RBC 5.71*   HGB 13.5   HCT 43.3      RDW 14.3   NEUTROABS 4.33       Behavioral Health Medications: all current active meds have been reviewed.    Current Facility-Administered Medications   Medication Dose Route Frequency Provider Last Rate    acetaminophen  650 mg Oral Q4H PRN Jordan C Holter, DO      acetaminophen  650 mg Oral Q6H PRN HOLLI Lion       aluminum-magnesium hydroxide-simethicone  30 mL Oral Q4H PRN Jordan C Holter, DO      amLODIPine  5 mg Oral Daily HOLLI Lion      ARIPiprazole  30 mg Oral Daily Bora Rosario MD      Artificial Tears  1 drop Both Eyes Q3H PRN Jordan C Holter, DO      atropine  1 drop Sublingual HS Harjeet Torres,       haloperidol lactate  2.5 mg Intramuscular Q4H PRN Max 4/day HOLLI Lion      And    LORazepam  1 mg Intramuscular Q4H PRN Max 4/day HOLLI Lion      And    benztropine  0.5 mg Intramuscular Q4H PRN Max 4/day HOLLI Lion      benztropine  1 mg Intramuscular Q4H PRN Max 6/day Jordan C Holter, DO      haloperidol lactate  5 mg Intramuscular Q4H PRN Max 4/day HOLLI Lion      And    LORazepam  2 mg Intramuscular Q4H PRN Max 4/day HOLLI Lion      And    benztropine  1 mg Intramuscular Q4H PRN Max 4/day HOLLI Lion      benztropine  1 mg Oral Q4H PRN Max 6/day HOLLI Lion      benztropine  1 mg Oral Q4H PRN Max 6/day Jordan C Holter, DO      bisacodyl  10 mg Rectal Daily PRN HOLLI Lion      calcium carbonate  500 mg Oral BID PRN HOLLI Monge      carbamide peroxide  5 drop Both Ears BID HOLLI Monge      cloZAPine  225 mg Oral HS Hardik So MD      hydrOXYzine HCL  50 mg Oral Q6H PRN Max 4/day Jordan C Holter, DO      Or    diphenhydrAMINE  50 mg Intramuscular Q6H PRN Jordan C Holter, DO      hydrOXYzine HCL  50 mg Oral Q6H PRN Max 4/day HOLLI Lion      Or    diphenhydrAMINE  50 mg Intramuscular Q6H PRN HOLLI Lion      diphenhydrAMINE-zinc acetate   Topical BID PRN HOLLI Lion      escitalopram  20 mg Oral Daily HOLLI Lion      famotidine  20 mg Oral BID PRN HOLLI Monge      fluticasone  1 spray Each Nare Daily PRN HOLLI Monge      glycopyrrolate  1 mg Oral BID (AM & Afternoon) Bora Rosario MD      glycopyrrolate  2 mg Oral HS Bora Rosario,  MD      haloperidol  1 mg Oral Q6H PRN HOLLI Lion      haloperidol  2.5 mg Oral Q4H PRN Max 4/day HOLLI Lion      haloperidol  5 mg Oral Q4H PRN Max 4/day HOLLI Lion      hydroCHLOROthiazide  12.5 mg Oral Daily HOLLI Galvan      hydrocortisone   Topical 4x Daily PRN HOLLI Lion      hydrOXYzine HCL  100 mg Oral Q6H PRN Max 4/day [facility] Holter, DO      Or    LORazepam  2 mg Intramuscular Q6H PRN [facility] Holter, DO      hydrOXYzine HCL  100 mg Oral Q6H PRN Max 4/day HOLLI Lion      Or    LORazepam  2 mg Intramuscular Q6H PRN HOLLI Lion      hydrOXYzine HCL  25 mg Oral Q6H PRN Max 4/day [facility] Holter, DO      ibuprofen  600 mg Oral Q8H PRN HOLLI Lion      influenza vaccine  0.5 mL Intramuscular Once Bora Rosario MD      loratadine  10 mg Oral Daily Brina uGillen MD      melatonin  3 mg Oral HS PRN Bora Rosario MD      melatonin  9 mg Oral HS Bora Rosario MD      methocarbamol  500 mg Oral Q6H PRN HOLLI Lion      multivitamin-minerals  1 tablet Oral Daily HOLLI Monge      nicotine polacrilex  2 mg Oral Q4H PRN Bora Rosario MD      OLANZapine  5 mg Oral Q4H PRN Max 3/day [facility] Holter, DO      Or    OLANZapine  2.5 mg Intramuscular Q4H PRN Max 3/day [facility] Holter, DO      OLANZapine  5 mg Oral Q3H PRN Max 3/day [facility] Holter, DO      Or    OLANZapine  5 mg Intramuscular Q3H PRN Max 3/day [facility] Holter, DO      OLANZapine  2.5 mg Oral Q4H PRN Max 6/day [facility] Holter, DO      ondansetron  4 mg Oral Q6H PRN HOLLI Lion      pantoprazole  20 mg Oral QAM [facility] Holter, DO      polyethylene glycol  17 g Oral Daily PRN HOLLI Ghotra      propranolol  10 mg Oral Q12H KAYLIE HOLLI Lion      senna-docusate sodium  1 tablet Oral Daily PRN [facility] Holter, DO      senna-docusate sodium  2 tablet Oral HS Melvina De Jesus DO      traZODone  50 mg Oral HS PRN HOLLI Lion      white petrolatum-mineral  "oil   Topical TID PRN HOLLI Lion         Risks, benefits and possible side effects of Medications:   Risks, benefits, and possible side effects of medications explained to patient and patient verbalizes understanding.      Dual Antipsychotic Rationale: Patient is on dual-antipsychotics due to previous treatment failure.      Mental Status Evaluation:  Appearance:  Patient is wearing hospital gown, and light green hospital pants. Patient appears slightly disheveled but in no acute distress.    Behavior:  Patient is calm, cooperative, and pleasant this morning. He is walking the halls at the time of this interview. No stereotyped behavior is noted.    Speech:  Speech is of normal volume, rate, and pitch. Speech is linear, coherent, and logical.    Mood:  \"Depressed\"    Affect:  Sad, mood-congruent, appropriate to thought content, and stable throughout interview.    Thought Process:  organized, logical, coherent, goal directed, linear, normal rate of thoughts   Associations: intact associations   Thought Content:  Pt denies SI and HI at time of this interview. Pt notes symptoms consistent with persecutory delusions in regards to the voices that he hears.    Perceptual Disturbances: Patient endorses AH, but denies symptoms consistent with VH. Patient does not appear to be responding to internal stimuli at the time of this interview.    Risk Potential: Suicidal ideation - denies  Homicidal ideation - denies  Potential for aggression - Not at present   Sensorium:  oriented to person, place, and time/date   Memory:  recent and remote memory grossly intact   Consciousness:  alert and awake   Attention/Concentration: attention span and concentration are age appropriate   Insight:  partial   Judgment: fair   Gait/Station: normal gait/station   Motor Activity: no abnormal movements     Recommended Treatment:   See above for assessment and plan.    Counseling/Coordination of Care    Total unit time spent today was " greater than 15 minutes. Greater than 50% of total time was spent with the patient and/or patient's relatives and/or coordination of patient's care.     Patient's rights, confidentiality, exceptions to confidentiality, use of electronic medical record including appropriate staff access to medical record regarding behavioral health services and consent to treatment were reviewed.    Note Share:     This note was not shared with the patient due to reasonable likelihood of causing patient harm     Yamila Lopes   Psychiatry, PA-S

## 2024-10-22 NOTE — ASSESSMENT & PLAN NOTE
Reviewed on 10/22/24    Continue famotidine 20 mg tablet BID per medical team   Continue glycopyrrolate 1 mg tablet BID AM and afternoon per medical team  Continue glycopyrrolate 2 mg tablet before bed per medical team  Continue pantoprazole EC 20 mg tablet every morning per medical team    No associated orders from this encounter found during lookback period of 72 hours.

## 2024-10-22 NOTE — ASSESSMENT & PLAN NOTE
Reviewed on 10/22/24    Ongoing by medical    No associated orders from this encounter found during lookback period of 72 hours.

## 2024-10-22 NOTE — ASSESSMENT & PLAN NOTE
Reviewed on 10/22/24    Continue senna-docusate sodium two tablets before bed per medical team    No associated orders from this encounter found during lookback period of 72 hours.

## 2024-10-22 NOTE — PLAN OF CARE
Problem: Alteration in Thoughts and Perception  Goal: Treatment Goal: Gain control of psychotic behaviors/thinking, reduce/eliminate presenting symptoms and demonstrate improved reality functioning upon discharge  Outcome: Progressing  Goal: Verbalize thoughts and feelings  Description: Interventions:  - Promote a nonjudgmental and trusting relationship with the patient through active listening and therapeutic communication  - Assess patient's level of functioning, behavior and potential for risk  - Engage patient in 1 on 1 interactions  - Encourage patient to express fears, feelings, frustrations, and discuss symptoms    - [place] patient to reality, help patient recognize reality-based thinking   - Administer medications as ordered and assess for potential side effects  - Provide the patient education related to the signs and symptoms of the illness and desired effects of prescribed medications  Outcome: Progressing  Goal: Refrain from acting on delusional thinking/internal stimuli  Description: Interventions:  - Monitor patient closely, per order   - Utilize least restrictive measures   - Set reasonable limits, give positive feedback for acceptable   - Administer medications as ordered and monitor of potential side effects  Outcome: Progressing  Goal: Agree to be compliant with medication regime, as prescribed and report medication side effects  Description: Interventions:  - Offer appropriate PRN medication and supervise ingestion; conduct AIMS, as needed   Outcome: Progressing  Goal: Attend and participate in unit activities, including therapeutic, recreational, and educational groups  Description: Interventions:  -Encourage Visitation and family involvement in care  Outcome: Progressing  Goal: Recognize dysfunctional thoughts, communicate reality-based thoughts at the time of discharge  Description: Interventions:  - Provide medication and psycho-education to assist patient in compliance and developing  insight into his/her illness   Outcome: Progressing  Goal: Complete daily ADLs, including personal hygiene independently, as able  Description: Interventions:  - Observe, teach, and assist patient with ADLS  - Monitor and promote a balance of rest/activity, with adequate nutrition and elimination   Outcome: Progressing     Problem: Ineffective Coping  Goal: Identifies ineffective coping skills  Outcome: Progressing  Goal: Identifies healthy coping skills  Outcome: Progressing  Goal: Demonstrates healthy coping skills  Outcome: Progressing  Goal: Participates in unit activities  Description: Interventions:  - Provide therapeutic environment   - Provide required programming   - Redirect inappropriate behaviors   Outcome: Progressing  Goal: Patient/Family participate in treatment and DC plans  Description: Interventions:  - Provide therapeutic environment  Outcome: Progressing  Goal: Patient/Family verbalizes awareness of resources  Outcome: Progressing     Problem: Depression  Goal: Treatment Goal: Demonstrate behavioral control of depressive symptoms, verbalize feelings of improved mood/affect, and adopt new coping skills prior to discharge  Outcome: Progressing  Goal: Verbalize thoughts and feelings  Description: Interventions:  - Assess and re-assess patient's level of risk   - Engage patient in 1:1 interactions, daily, for a minimum of 15 minutes   - Encourage patient to express feelings, fears, frustrations, hopes   Outcome: Progressing  Goal: Refrain from harming self  Description: Interventions:  - Monitor patient closely, per order   - Supervise medication ingestion, monitor effects and side effects   Outcome: Progressing  Goal: Refrain from isolation  Description: Interventions:  - Develop a trusting relationship   - Encourage socialization   Outcome: Progressing  Goal: Refrain from self-neglect  Outcome: Progressing  Goal: Attend and participate in unit activities, including therapeutic, recreational, and  educational groups  Description: Interventions:  - Provide therapeutic and educational activities daily, encourage attendance and participation, and document same in the medical record   Outcome: Progressing  Goal: Complete daily ADLs, including personal hygiene independently, as able  Description: Interventions:  - Observe, teach, and assist patient with ADLS  -  Monitor and promote a balance of rest/activity, with adequate nutrition and elimination   Outcome: Progressing     Problem: Anxiety  Goal: Anxiety is at manageable level  Description: Interventions:  - Assess and monitor patient's anxiety level.   - Monitor for signs and symptoms (heart palpitations, chest pain, shortness of breath, headaches, nausea, feeling jumpy, restlessness, irritable, apprehensive).   - Collaborate with interdisciplinary team and initiate plan and interventions as ordered.  - [place] patient to unit/surroundings  - Explain treatment plan  - Encourage participation in care  - Encourage verbalization of concerns/fears  - Identify coping mechanisms  - Assist in developing anxiety-reducing skills  - Administer/offer alternative therapies  - Limit or eliminate stimulants  Outcome: Progressing     Problem: Alteration in Orientation  Goal: Treatment Goal: Demonstrate a reduction of confusion and improved orientation to person, place, time and/or situation upon discharge, according to optimum baseline/ability  Outcome: Progressing  Goal: Interact with staff daily  Description: Interventions:  - Assess and re-assess patient's level of orientation  - Engage patient in 1 on 1 interactions, daily, for a minimum of 15 minutes   - Establish rapport/trust with patient   Outcome: Progressing  Goal: Express concerns related to confused thinking related to:  Description: Interventions:  - Encourage patient to express feelings, fears, frustrations, hopes  - Assign consistent caregivers   - [place]/re-orient patient as needed  - Allow comfort items, as  appropriate  - Provide visual cues, signs, etc.   Outcome: Progressing  Goal: Allow medical examinations, as recommended  Description: Interventions:  - Provide physical/neurological exams and/or referrals, per provider   Outcome: Progressing  Goal: Cooperate with recommended testing/procedures  Description: Interventions:  - Determine need for ancillary testing  - Observe for mental status changes  - Implement falls/precaution protocol   Outcome: Progressing  Goal: Attend and participate in unit activities, including therapeutic, recreational, and educational groups  Description: Interventions:  - Provide therapeutic and educational activities daily, encourage attendance and participation, and document same in the medical record   - Provide appropriate opportunities for reminiscence   - Provide a consistent daily routine   - Encourage family contact/visitation   Outcome: Progressing  Goal: Complete daily ADLs, including personal hygiene independently, as able  Description: Interventions:  - Observe, teach, and assist patient with ADLS  Outcome: Progressing     Problem: DISCHARGE PLANNING - CARE MANAGEMENT  Goal: Discharge to post-acute care or home with appropriate resources  Description: INTERVENTIONS:  - Conduct assessment to determine patient/family and health care team treatment goals, and need for post-acute services based on payer coverage, community resources, and patient preferences, and barriers to discharge  - Address psychosocial, clinical, and financial barriers to discharge as identified in assessment in conjunction with the patient/family and health care team  - Arrange appropriate level of post-acute services according to patient's   needs and preference and payer coverage in collaboration with the physician and health care team  - Communicate with and update the patient/family, physician, and health care team regarding progress on the discharge plan  - Arrange appropriate transportation to  post-acute venues  Outcome: Progressing

## 2024-10-22 NOTE — ASSESSMENT & PLAN NOTE
Reviewed on 10/22/24      No associated orders from this encounter found during lookback period of 72 hours.

## 2024-10-22 NOTE — ASSESSMENT & PLAN NOTE
"Reviewed on 10/22/24    Pt continues to endorse AH that tell him to harm himself. He denies symptoms consistent with VH at this time. He notes that his feelings of depression and anxiety are slightly worse today. He notes a poor appetite, but \"decent\" sleep, stating that he woke up twice throughout the night. He denies symptoms consistent with grandiose delusions. He does not appear to be responding to internal stimuli at the time of this interview.     Continue Clozapine 225 mg qhs for psychosis -- to consider further careful titration for Sxs depending on response  Continue to obtain CBC with diff, CRP, and CK (weekly on Tues), and a Clozapine level and BNP (every 2 weeks on Tues)    Continue Abilify 30 mg qd for psychosis  Continue Escitalopram 20 mg qd for depression and anxiety  Continue Senna-Docusate sodium two 50 mg tablets daily before bed for constipation  Continue propanolol 10 mg bid for anxiety     Pt remains on dual antipsychotic Tx due to failure on monotherapy      ACT team referral was made    No associated orders from this encounter found during lookback period of 72 hours.    "

## 2024-10-23 PROCEDURE — 99232 SBSQ HOSP IP/OBS MODERATE 35: CPT | Performed by: PSYCHIATRY & NEUROLOGY

## 2024-10-23 RX ADMIN — NICOTINE POLACRILEX 2 MG: 2 GUM, CHEWING ORAL at 12:58

## 2024-10-23 RX ADMIN — ESCITALOPRAM OXALATE 20 MG: 10 TABLET, FILM COATED ORAL at 08:44

## 2024-10-23 RX ADMIN — LORATADINE 10 MG: 10 TABLET ORAL at 08:45

## 2024-10-23 RX ADMIN — MULTIPLE VITAMINS W/ MINERALS TAB 1 TABLET: TAB ORAL at 08:45

## 2024-10-23 RX ADMIN — GLYCOPYRROLATE 1 MG: 1 TABLET ORAL at 08:45

## 2024-10-23 RX ADMIN — PANTOPRAZOLE SODIUM 20 MG: 20 TABLET, DELAYED RELEASE ORAL at 08:44

## 2024-10-23 RX ADMIN — NICOTINE POLACRILEX 2 MG: 2 GUM, CHEWING ORAL at 08:46

## 2024-10-23 RX ADMIN — PROPRANOLOL HYDROCHLORIDE 10 MG: 10 TABLET ORAL at 21:48

## 2024-10-23 RX ADMIN — ATROPINE SULFATE 1 DROP: 10 SOLUTION/ DROPS OPHTHALMIC at 21:17

## 2024-10-23 RX ADMIN — MELATONIN TAB 3 MG 9 MG: 3 TAB at 21:17

## 2024-10-23 RX ADMIN — GLYCOPYRROLATE 1 MG: 1 TABLET ORAL at 13:00

## 2024-10-23 RX ADMIN — PROPRANOLOL HYDROCHLORIDE 10 MG: 10 TABLET ORAL at 08:43

## 2024-10-23 RX ADMIN — HYDROCHLOROTHIAZIDE 12.5 MG: 12.5 TABLET ORAL at 08:45

## 2024-10-23 RX ADMIN — NICOTINE POLACRILEX 2 MG: 2 GUM, CHEWING ORAL at 21:17

## 2024-10-23 RX ADMIN — SENNOSIDES AND DOCUSATE SODIUM 2 TABLET: 8.6; 5 TABLET ORAL at 21:17

## 2024-10-23 RX ADMIN — ARIPIPRAZOLE 30 MG: 15 TABLET ORAL at 08:45

## 2024-10-23 RX ADMIN — NICOTINE POLACRILEX 2 MG: 2 GUM, CHEWING ORAL at 17:16

## 2024-10-23 RX ADMIN — AMLODIPINE BESYLATE 5 MG: 5 TABLET ORAL at 08:44

## 2024-10-23 RX ADMIN — CLOZAPINE 225 MG: 25 TABLET ORAL at 21:17

## 2024-10-23 RX ADMIN — GLYCOPYRROLATE 2 MG: 1 TABLET ORAL at 21:17

## 2024-10-23 NOTE — ASSESSMENT & PLAN NOTE
Reviewed on 10/23/24    Pt continues to endorse AH that tell him to harm himself. He denies symptoms consistent with VH at this time. He denies SI and HI, and notes that he feels safe with the care team on this unit. He notes persistent feelings of depression and anxiety, as well as fear regarding the voices. He notes a poor appetite, but ate a full meal of [place] food yesterday that was brought by his sisters. He notes good sleep. He denies symptoms consistent with grandiose delusions. He does not appear to be responding to internal stimuli at the time of this interview.     Continue Clozapine 225 mg qhs for psychosis -- to consider further careful titration for Sxs depending on response  Continue to obtain CBC with diff, CRP, and CK (weekly on Tues), and a Clozapine level and BNP (every 2 weeks on Tues)    Continue Abilify 30 mg qd for psychosis  Continue Escitalopram 20 mg qd for depression and anxiety  Continue Senna-Docusate sodium two 50 mg tablets daily before bed for constipation  Continue propanolol 10 mg bid for anxiety     Pt remains on dual antipsychotic Tx due to failure on monotherapy      ACT team referral was made    No associated orders from this encounter found during lookback period of 72 hours.

## 2024-10-23 NOTE — PROGRESS NOTES
Progress Note - Behavioral Health   Name: Alberto Berumen 29 y.o. male I MRN: [id number]  Unit/Bed#: [facility] 112-02 I Date of Admission: 3/29/2024   Date of Service: 10/23/2024 I Hospital Day: 208     Assessment & Plan  Schizoaffective disorder, bipolar type (HCC)  Reviewed on 10/23/24    Pt continues to endorse AH that tell him to harm himself. He denies symptoms consistent with VH at this time. He denies SI and HI, and notes that he feels safe with the care team on this unit. He notes persistent feelings of depression and anxiety, as well as fear regarding the voices. He notes a poor appetite, but ate a full meal of [place] food yesterday that was brought by his sisters. He notes good sleep. He denies symptoms consistent with grandiose delusions. He does not appear to be responding to internal stimuli at the time of this interview.     Continue Clozapine 225 mg qhs for psychosis -- to consider further careful titration for Sxs depending on response  Continue to obtain CBC with diff, CRP, and CK (weekly on Tues), and a Clozapine level and BNP (every 2 weeks on Tues)    Continue Abilify 30 mg qd for psychosis  Continue Escitalopram 20 mg qd for depression and anxiety  Continue Senna-Docusate sodium two 50 mg tablets daily before bed for constipation  Continue propanolol 10 mg bid for anxiety     Pt remains on dual antipsychotic Tx due to failure on monotherapy      ACT team referral was made    No associated orders from this encounter found during lookback period of 72 hours.    Chronic idiopathic constipation  Reviewed on 10/23/24    Continue senna-docusate sodium two tablets before bed per medical team    No associated orders from this encounter found during lookback period of 72 hours.  GERD (gastroesophageal reflux disease)  Reviewed on 10/23/24    Continue famotidine 20 mg tablet BID per medical team   Continue glycopyrrolate 1 mg tablet BID AM and afternoon per medical team  Continue glycopyrrolate 2 mg tablet  "before bed per medical team  Continue pantoprazole EC 20 mg tablet every morning per medical team    No associated orders from this encounter found during lookback period of 72 hours.  Medical clearance for psychiatric admission  Reviewed on 10/23/24    Ongoing by medical    No associated orders from this encounter found during lookback period of 72 hours.    Tobacco abuse  Reviewed on 10/23/24    Continue to encourage cessation upon discharge    No associated orders from this encounter found during lookback period of 72 hours.  Elevated hemoglobin A1c  Reviewed on 10/23/24      No associated orders from this encounter found during lookback period of 72 hours.      [facility]  Psychiatric Progress Note - Department of Behavioral Health   Alberto Berumen 29 y.o. male MRN: [id number]  Unit/Bed#: [facility] 112-02 Encounter: [id number]    SUBJECTIVE     Patient evaluated this a.m. for continuity of care. Patient was discussed at length with nursing and treatment team. Per nursing, patient has been social with peers, but continues to have poor appetite, eating 0% of breakfast yesterday, 100% of lunch, and 25% of dinner. His sisters visited yesterday, which nursing reports went well. He attended 10 out of 12 groups yesterday.     During evaluation, no acute distress is noted. Alberto Berumen noted that he is \"okay\" today, but he continues to feel depressed and anxious. He was calm and willing to engage, but was very short in his answers. He continues to endorse symptoms consistent with auditory hallucinations, which are telling him to harm himself as they have been before. He notes fear and anxiety regarding these voices. When asked, he notes that he feels safe on the unit and that he trusts the care team. He denies symptoms consistent with visual hallucinations. Patient states that he enjoyed his visit with his two sisters yesterday, and that he ate 100% of the [employer] food that they brought him. " Patient denies SI or HI, and does not appear to be responding to internal stimuli at the time of interview.       PSYCHIATRIC REVIEW OF SYSTEMS     Behavior over the last 24 hours:  unchanged  Sleep: normal  Appetite: poor  Medication side effects: No    Progress Toward Goals: Patient is progressing towards goals by continuing to attend individual, social and therapeutic milieu, and adhering to his medication regimen.  Patient Acceptance: Patient is accepting of his treatment regimen, and cooperative.   Patient Understanding: Patient understands his need for treatment and continues to adhere.   Patient Involvement in Reintegration Process: Patient remains in positive contact with his family, enjoying a visit from his sisters yesterday. He continues to attend groups and adhere to regimen.   Patient's Therapeutic Relationship with Psychiatrist: Trusting  Patient's Optimism Regarding Recovery: Patient continues to adhere to regimen and attend groups, as well as honestly express his hallucinations.   Group Attendance: Patient attended 10 out of 12 groups yesterday.     REVIEW OF SYSTEMS   Review of systems: no complaints    OBJECTIVE     Vital Signs in Past 24 Hours:  Temp:  [97.5 °F (36.4 °C)-97.9 °F (36.6 °C)] 97.9 °F (36.6 °C)  HR:  [89-97] 89  BP: (123-141)/(65-94) 133/65  Resp:  [18] 18  SpO2:  [98 %-99 %] 98 %  O2 Device: None (Room air)    Intake/Output in Past 24 hours:  No intake/output data recorded.  No intake/output data recorded.        Laboratory Results:  I have personally reviewed all pertinent laboratory/tests results.  Labs in last 72 hours:   Recent Labs     10/22/24  0933   WBC 7.96   RBC 5.71*   HGB 13.5   HCT 43.3      RDW 14.3   NEUTROABS 4.33       Behavioral Health Medications: all current active meds have been reviewed.    Current Facility-Administered Medications   Medication Dose Route Frequency Provider Last Rate    acetaminophen  650 mg Oral Q4H PRN Jordan C Holter, DO       acetaminophen  650 mg Oral Q6H PRN HOLLI Lion      aluminum-magnesium hydroxide-simethicone  30 mL Oral Q4H PRN Jordan C Holter, DO      amLODIPine  5 mg Oral Daily HOLLI Lion      ARIPiprazole  30 mg Oral Daily Bora Rosario MD      Artificial Tears  1 drop Both Eyes Q3H PRN Jordan C Holter, DO      atropine  1 drop Sublingual HS Harjeet TorresDO      haloperidol lactate  2.5 mg Intramuscular Q4H PRN Max 4/day HOLLI Lino      And    LORazepam  1 mg Intramuscular Q4H PRN Max 4/day HOLLI Lion      And    benztropine  0.5 mg Intramuscular Q4H PRN Max 4/day HOLLI Lion      benztropine  1 mg Intramuscular Q4H PRN Max 6/day Jordan C Holter, DO      haloperidol lactate  5 mg Intramuscular Q4H PRN Max 4/day HOLLI Lion      And    LORazepam  2 mg Intramuscular Q4H PRN Max 4/day HOLLI Lion      And    benztropine  1 mg Intramuscular Q4H PRN Max 4/day HOLLI Lion      benztropine  1 mg Oral Q4H PRN Max 6/day HOLLI Lion      benztropine  1 mg Oral Q4H PRN Max 6/day Jordan C Holter, DO      bisacodyl  10 mg Rectal Daily PRN HOLLI Lion      calcium carbonate  500 mg Oral BID PRN HOLLI Monge      carbamide peroxide  5 drop Both Ears BID HOLLI Monge      cloZAPine  225 mg Oral HS Hardik So MD      hydrOXYzine HCL  50 mg Oral Q6H PRN Max 4/day Jordan C Holter, DO      Or    diphenhydrAMINE  50 mg Intramuscular Q6H PRN Jordan C Holter, DO      hydrOXYzine HCL  50 mg Oral Q6H PRN Max 4/day HOLLI Lion      Or    diphenhydrAMINE  50 mg Intramuscular Q6H PRN HOLLI Lion      diphenhydrAMINE-zinc acetate   Topical BID PRN HOLLI Lion      escitalopram  20 mg Oral Daily HOLLI Lion      famotidine  20 mg Oral BID PRN Eileen Miya Kormandy, CRNP      fluticasone  1 spray Each Nare Daily PRN HOLLI Monge      glycopyrrolate  1 mg Oral BID (AM & Afternoon) Bora DE GUZMAN  MD Abraham      glycopyrrolate  2 mg Oral HS Bora Rosario MD      haloperidol  1 mg Oral Q6H PRN HOLLI Lion      haloperidol  2.5 mg Oral Q4H PRN Max 4/day HOLLI Lion      haloperidol  5 mg Oral Q4H PRN Max 4/day HOLLI Lion      hydroCHLOROthiazide  12.5 mg Oral Daily Pia Mantilla, HOLLI      hydrocortisone   Topical 4x Daily PRN HOLLI Lion      hydrOXYzine HCL  100 mg Oral Q6H PRN Max 4/day [facility] Holter, DO      Or    LORazepam  2 mg Intramuscular Q6H PRN [facility] Holter, DO      hydrOXYzine HCL  100 mg Oral Q6H PRN Max 4/day HOLLI Lion      Or    LORazepam  2 mg Intramuscular Q6H PRN HOLLI Lion      hydrOXYzine HCL  25 mg Oral Q6H PRN Max 4/day [facility] Holter, DO      ibuprofen  600 mg Oral Q8H PRN HOLLI Lion      influenza vaccine  0.5 mL Intramuscular Once Bora Rosario MD      loratadine  10 mg Oral Daily Brina Guillen MD      melatonin  3 mg Oral HS PRN Bora Rosario MD      melatonin  9 mg Oral HS Bora Rosario MD      methocarbamol  500 mg Oral Q6H PRN HOLLI Lion      multivitamin-minerals  1 tablet Oral Daily Eileen Jensen, HOLLI      nicotine polacrilex  2 mg Oral Q4H PRN Bora Rosario MD      OLANZapine  5 mg Oral Q4H PRN Max 3/day [facility] Holter, DO      Or    OLANZapine  2.5 mg Intramuscular Q4H PRN Max 3/day [facility] Holter, DO      OLANZapine  5 mg Oral Q3H PRN Max 3/day [facility] Holter, DO      Or    OLANZapine  5 mg Intramuscular Q3H PRN Max 3/day [facility] Holter, DO      OLANZapine  2.5 mg Oral Q4H PRN Max 6/day [facility] Holter, DO      ondansetron  4 mg Oral Q6H PRN HOLLI Lion      [facility]      polyethylene glycol  17 g Oral Daily PRN HOLLI Ghotra      propranolol  10 mg Oral Q12H KAYLIE HOLLI Lion      senna-docusate sodium  1 tablet Oral Daily PRN Ion C Holter, DO      senna-docusate sodium  2 tablet Oral HS Melvina De Jesus DO      traZODone  50 mg  "Oral HS PRN HOLLI Lion      white petrolatum-mineral oil   Topical TID PRN HOLLI Lion         Risks, benefits and possible side effects of Medications:   Risks, benefits, and possible side effects of medications explained to patient and patient verbalizes understanding.      Dual Antipsychotic Rationale: Patient is on dual antipsychotics due to previous treatment failure.      Mental Status Evaluation:  Appearance:  Patient is a heavy-set dark skinned male, wearing a black sweatshirt and light green hospital scrub pants. He appears slightly disheveled, appears his stated age, and is in no acute distress.    Behavior:  Patient is calm and cooperative. Patient is walking the halls during this interview. No stereotyped behavior is noted.    Speech:  Speech is of normal volume, but answers were short. Speech was logical, linear, and goal-directed.    Mood:  \"Okay\"   Affect:  Depressed, stable throughout interview, constricted, appropriate to thought content   Thought Process:  organized, logical, coherent, goal directed, linear, normal rate of thoughts, negative thinking   Associations: intact associations   Thought Content:  Patient notes fear and paranoia in regards to the voices, but notes trust with the care team. Patient denies grandiose delusions, and does not appear to be having overt delusions at this time. Patient denies SI and HI.    Perceptual Disturbances: Patient endorses auditory hallucinations, but denies symptoms consistent with visual hallucinations. Patient does not appear to be responding to internal stimuli at this time.    Risk Potential: Suicidal ideation - denies  Homicidal ideation - denies  Potential for aggression - Not at present   Sensorium:  oriented to person, place, and time/date   Memory:  recent and remote memory grossly intact   Consciousness:  alert and awake   Attention/Concentration: attention span and concentration are age appropriate   Insight:  fair   Judgment: " fair   Gait/Station: normal gait/station   Motor Activity: no abnormal movements     Recommended Treatment:   See above for assessment and plan.    Counseling/Coordination of Care    Total unit time spent today was greater than 15 minutes. Greater than 50% of total time was spent with the patient and/or patient's relatives and/or coordination of patient's care.     Patient's rights, confidentiality, exceptions to confidentiality, use of electronic medical record including appropriate staff access to medical record regarding behavioral health services and consent to treatment were reviewed.    Note Share:     This note was not shared with the patient due to reasonable likelihood of causing patient harm     Yamila Lopes   Psychiatry, PA-S

## 2024-10-23 NOTE — PLAN OF CARE
Problem: Alteration in Thoughts and Perception  Goal: Verbalize thoughts and feelings  Description: Interventions:  - Promote a nonjudgmental and trusting relationship with the patient through active listening and therapeutic communication  - Assess patient's level of functioning, behavior and potential for risk  - Engage patient in 1 on 1 interactions  - Encourage patient to express fears, feelings, frustrations, and discuss symptoms    - [place] patient to reality, help patient recognize reality-based thinking   - Administer medications as ordered and assess for potential side effects  - Provide the patient education related to the signs and symptoms of the illness and desired effects of prescribed medications  Outcome: Progressing  Goal: Refrain from acting on delusional thinking/internal stimuli  Description: Interventions:  - Monitor patient closely, per order   - Utilize least restrictive measures   - Set reasonable limits, give positive feedback for acceptable   - Administer medications as ordered and monitor of potential side effects  Outcome: Progressing  Goal: Agree to be compliant with medication regime, as prescribed and report medication side effects  Description: Interventions:  - Offer appropriate PRN medication and supervise ingestion; conduct AIMS, as needed   Outcome: Progressing  Goal: Attend and participate in unit activities, including therapeutic, recreational, and educational groups  Description: Interventions:  -Encourage Visitation and family involvement in care  Outcome: Progressing  Goal: Recognize dysfunctional thoughts, communicate reality-based thoughts at the time of discharge  Description: Interventions:  - Provide medication and psycho-education to assist patient in compliance and developing insight into his/her illness   Outcome: Progressing  Goal: Complete daily ADLs, including personal hygiene independently, as able  Description: Interventions:  - Observe, teach, and assist  patient with ADLS  - Monitor and promote a balance of rest/activity, with adequate nutrition and elimination   Outcome: Progressing     Problem: Ineffective Coping  Goal: Identifies ineffective coping skills  Outcome: Progressing  Goal: Identifies healthy coping skills  Outcome: Progressing  Goal: Demonstrates healthy coping skills  Outcome: Progressing  Goal: Participates in unit activities  Description: Interventions:  - Provide therapeutic environment   - Provide required programming   - Redirect inappropriate behaviors   Outcome: Progressing     Problem: Depression  Goal: Treatment Goal: Demonstrate behavioral control of depressive symptoms, verbalize feelings of improved mood/affect, and adopt new coping skills prior to discharge  Outcome: Progressing  Goal: Verbalize thoughts and feelings  Description: Interventions:  - Assess and re-assess patient's level of risk   - Engage patient in 1:1 interactions, daily, for a minimum of 15 minutes   - Encourage patient to express feelings, fears, frustrations, hopes   Outcome: Progressing  Goal: Refrain from harming self  Description: Interventions:  - Monitor patient closely, per order   - Supervise medication ingestion, monitor effects and side effects   Outcome: Progressing  Goal: Refrain from isolation  Description: Interventions:  - Develop a trusting relationship   - Encourage socialization   Outcome: Progressing  Goal: Refrain from self-neglect  Outcome: Progressing  Goal: Attend and participate in unit activities, including therapeutic, recreational, and educational groups  Description: Interventions:  - Provide therapeutic and educational activities daily, encourage attendance and participation, and document same in the medical record   Outcome: Progressing  Goal: Complete daily ADLs, including personal hygiene independently, as able  Description: Interventions:  - Observe, teach, and assist patient with ADLS  -  Monitor and promote a balance of rest/activity,  with adequate nutrition and elimination   Outcome: Progressing     Problem: Anxiety  Goal: Anxiety is at manageable level  Description: Interventions:  - Assess and monitor patient's anxiety level.   - Monitor for signs and symptoms (heart palpitations, chest pain, shortness of breath, headaches, nausea, feeling jumpy, restlessness, irritable, apprehensive).   - Collaborate with interdisciplinary team and initiate plan and interventions as ordered.  - [place] patient to unit/surroundings  - Explain treatment plan  - Encourage participation in care  - Encourage verbalization of concerns/fears  - Identify coping mechanisms  - Assist in developing anxiety-reducing skills  - Administer/offer alternative therapies  - Limit or eliminate stimulants  Outcome: Progressing     Problem: DISCHARGE PLANNING - CARE MANAGEMENT  Goal: Discharge to post-acute care or home with appropriate resources  Description: INTERVENTIONS:  - Conduct assessment to determine patient/family and health care team treatment goals, and need for post-acute services based on payer coverage, community resources, and patient preferences, and barriers to discharge  - Address psychosocial, clinical, and financial barriers to discharge as identified in assessment in conjunction with the patient/family and health care team  - Arrange appropriate level of post-acute services according to patient's   needs and preference and payer coverage in collaboration with the physician and health care team  - Communicate with and update the patient/family, physician, and health care team regarding progress on the discharge plan  - Arrange appropriate transportation to post-acute venues  Outcome: Progressing     Problem: Alteration in Thoughts and Perception  Goal: Treatment Goal: Gain control of psychotic behaviors/thinking, reduce/eliminate presenting symptoms and demonstrate improved reality functioning upon discharge  Outcome: Not Progressing

## 2024-10-23 NOTE — ASSESSMENT & PLAN NOTE
Reviewed on 10/23/24      No associated orders from this encounter found during lookback period of 72 hours.

## 2024-10-23 NOTE — NURSING NOTE
Patient has been visible the unit most of the shift.  Social with other peers. Smiling at intervals. Cooperative and pleasant.  Medication compliant. Continues to have auditory hallucination of someone telling him they are going to hurt him and  his family Support offered. Appetite good. Denies suicidal ideations. Since female peer S.C. was discharged he is brighter and more verbal with others. Offers no physical complaints.

## 2024-10-23 NOTE — ASSESSMENT & PLAN NOTE
Reviewed on 10/23/24    Continue to encourage cessation upon discharge    No associated orders from this encounter found during lookback period of 72 hours.

## 2024-10-23 NOTE — ASSESSMENT & PLAN NOTE
Reviewed on 10/23/24    Ongoing by medical    No associated orders from this encounter found during lookback period of 72 hours.

## 2024-10-23 NOTE — ASSESSMENT & PLAN NOTE
Reviewed on 10/23/24    Continue famotidine 20 mg tablet BID per medical team   Continue glycopyrrolate 1 mg tablet BID AM and afternoon per medical team  Continue glycopyrrolate 2 mg tablet before bed per medical team  Continue pantoprazole EC 20 mg tablet every morning per medical team    No associated orders from this encounter found during lookback period of 72 hours.

## 2024-10-23 NOTE — NURSING NOTE
Pt is calm, cooperative and visible on unit. Pt reports AH, depression and anxiety; denies SI/HI/VH. Appetite remains poor. Pt has minimal interaction with peers and reports sleeping well. Pt is able to make needs known and is medication compliant. Q 15 min checks maintained.

## 2024-10-23 NOTE — NURSING NOTE
"Pt is visible in the milieu and social with select peers. He consumed 25 % of dinner. Took his medications without incidence. Pt is pleasant, polite, and cooperative. Brightens on approach. Pt endorses depression, but it better this shift and the \"same\" AH. Pt given PRN eucerin cream for dry patch on neck at 2103. Attended 10/12 groups. No unmet needs. No behavioral issues.   "

## 2024-10-23 NOTE — CMS CERTIFICATION NOTE
RECERTIFICATION Of Continued Inpatient Care. On or Before The 30th Day  Date Due: 10/23/24    I certify that inpatient psychiatric hospital services furnished since the previous certification or recertifcation were, and continue to be, medically necessary for either, treatment which could reasonably be expected to improve the patient's condition, diagnostic study and that the hospital records indicate that the services furnished were either intensive treatment services, admission and related services necessary for diagnostic study, or equivalent services. The available community resources are not yet able to support him at this time and further course of action is documented in the individualized treatment plan    I estimate that the additional period of inpatient care will be 30 days or 4 weeks    Bora Rosario MD  10/23/24

## 2024-10-23 NOTE — ASSESSMENT & PLAN NOTE
Reviewed on 10/23/24    Continue senna-docusate sodium two tablets before bed per medical team    No associated orders from this encounter found during lookback period of 72 hours.

## 2024-10-23 NOTE — PROGRESS NOTES
10/23/24 0813   Team Meeting   Meeting Type Daily Rounds   Team Members Present   Team Members Present Physician;Nurse;;Other (Discipline and Name)   Physician Team Member Holter, Thomas   Nursing Team Member Thaddeus   Social Work Team Member Jose J ORTEGA   Other (Discipline and Name) Kathleen PCM   Patient/Family Present   Patient Present No   Patient's Family Present No   OTHER   Team Meeting - Additional Comments Wang PCM     Groups Participation  10/11.   Patient's compliant with medications. He's appropriate and engaged in treatment. Had visit with his family which went well. Waiting for [facility] for ACT services to discharge to the

## 2024-10-24 LAB — MISCELLANEOUS LAB TEST RESULT: NORMAL

## 2024-10-24 PROCEDURE — 99232 SBSQ HOSP IP/OBS MODERATE 35: CPT | Performed by: PSYCHIATRY & NEUROLOGY

## 2024-10-24 RX ADMIN — PANTOPRAZOLE SODIUM 20 MG: 20 TABLET, DELAYED RELEASE ORAL at 09:02

## 2024-10-24 RX ADMIN — PROPRANOLOL HYDROCHLORIDE 10 MG: 10 TABLET ORAL at 09:02

## 2024-10-24 RX ADMIN — NICOTINE POLACRILEX 2 MG: 2 GUM, CHEWING ORAL at 17:10

## 2024-10-24 RX ADMIN — CLOZAPINE 225 MG: 25 TABLET ORAL at 21:08

## 2024-10-24 RX ADMIN — ATROPINE SULFATE 1 DROP: 10 SOLUTION/ DROPS OPHTHALMIC at 21:39

## 2024-10-24 RX ADMIN — AMLODIPINE BESYLATE 5 MG: 5 TABLET ORAL at 09:01

## 2024-10-24 RX ADMIN — LORATADINE 10 MG: 10 TABLET ORAL at 09:02

## 2024-10-24 RX ADMIN — NICOTINE POLACRILEX 2 MG: 2 GUM, CHEWING ORAL at 21:10

## 2024-10-24 RX ADMIN — HYDROCHLOROTHIAZIDE 12.5 MG: 12.5 TABLET ORAL at 09:01

## 2024-10-24 RX ADMIN — SENNOSIDES AND DOCUSATE SODIUM 2 TABLET: 8.6; 5 TABLET ORAL at 21:09

## 2024-10-24 RX ADMIN — GLYCOPYRROLATE 1 MG: 1 TABLET ORAL at 14:30

## 2024-10-24 RX ADMIN — NICOTINE POLACRILEX 2 MG: 2 GUM, CHEWING ORAL at 09:02

## 2024-10-24 RX ADMIN — GLYCOPYRROLATE 1 MG: 1 TABLET ORAL at 09:02

## 2024-10-24 RX ADMIN — GLYCOPYRROLATE 2 MG: 1 TABLET ORAL at 21:09

## 2024-10-24 RX ADMIN — PROPRANOLOL HYDROCHLORIDE 10 MG: 10 TABLET ORAL at 21:09

## 2024-10-24 RX ADMIN — POLYETHYLENE GLYCOL 3350 17 G: 17 POWDER, FOR SOLUTION ORAL at 09:02

## 2024-10-24 RX ADMIN — ESCITALOPRAM OXALATE 20 MG: 10 TABLET, FILM COATED ORAL at 09:01

## 2024-10-24 RX ADMIN — MULTIPLE VITAMINS W/ MINERALS TAB 1 TABLET: TAB ORAL at 09:01

## 2024-10-24 RX ADMIN — ARIPIPRAZOLE 30 MG: 15 TABLET ORAL at 09:01

## 2024-10-24 RX ADMIN — MELATONIN TAB 3 MG 9 MG: 3 TAB at 21:08

## 2024-10-24 NOTE — ASSESSMENT & PLAN NOTE
Reviewed on 10/24/24    Ongoing by medical    No associated orders from this encounter found during lookback period of 72 hours.

## 2024-10-24 NOTE — PROGRESS NOTES
10/24/24 0813   Team Meeting   Meeting Type Daily Rounds   Team Members Present   Team Members Present Physician;Nurse;;Other (Discipline and Name)   Physician Team Member Abraham Mora   Nursing Team Member Claudia   Social Work Team Member Jose J ORTEGA   Other (Discipline and Name) Kathleen PCM   Patient/Family Present   Patient Present No   Patient's Family Present No   OTHER   Team Meeting - Additional Comments Wang PCM     Groups Participation  6/10.   Patient's compliant with medications. He's appropriate and engaged. Pending discharge to home once ACT funded approved through the [facility].

## 2024-10-24 NOTE — ASSESSMENT & PLAN NOTE
Reviewed on 10/24/24    Continue to encourage cessation upon discharge    No associated orders from this encounter found during lookback period of 72 hours.

## 2024-10-24 NOTE — ASSESSMENT & PLAN NOTE
Reviewed on 10/24/24    Continue famotidine 20 mg tablet BID per medical team   Continue glycopyrrolate 1 mg tablet BID AM and afternoon per medical team  Continue glycopyrrolate 2 mg tablet before bed per medical team  Continue pantoprazole EC 20 mg tablet every morning per medical team    No associated orders from this encounter found during lookback period of 72 hours.

## 2024-10-24 NOTE — NURSING NOTE
Received pt in bed at change of shift with eyes closed; chest movement noted.  Continues the same thus this far as per q 15 min room checks.   Will continue to monitor behavior, sleeping pattern and any medical issues that may arise.    0655:  Sleeping 7+ hrs thus this far

## 2024-10-24 NOTE — PROGRESS NOTES
Progress Note - Behavioral Health   Name: Alberto Berumen 29 y.o. male I MRN: [id number]  Unit/Bed#: [facility] 112-02 I Date of Admission: 3/29/2024   Date of Service: 10/24/2024 I Hospital Day: 209     Assessment & Plan  Schizoaffective disorder, bipolar type (HCC)  Reviewed on 10/24/24    Pt continues to endorse AH that tell him to hill himself. He denies symptoms consistent with VH at this time. He denies SI and HI, and notes that he feels safe with the care team on this unit. He notes persistent feelings of depression and anxiety, as well as fear and paranoia regarding the voices. He has had a poor appetite, which nursing reports is associated with his fear of weight gain. He notes good sleep. He denies symptoms consistent with grandiose delusions. He does not appear to be responding to internal stimuli at the time of this interview.     Continue Clozapine 225 mg qhs for psychosis -- to consider further careful titration for Sxs depending on response  Continue to obtain CBC with diff, CRP, and CK (weekly on Tues), and a Clozapine level and BNP (every 2 weeks on Tues)    Continue Abilify 30 mg qd for psychosis  Continue Escitalopram 20 mg qd for depression and anxiety  Continue Senna-Docusate sodium two 50 mg tablets daily before bed for constipation  Continue propanolol 10 mg bid for anxiety   Continue amlodipine 5 mg PO daily     Pt remains on dual antipsychotic Tx due to failure on monotherapy      ACT team referral was made    No associated orders from this encounter found during lookback period of 72 hours.    Chronic idiopathic constipation  Reviewed on 10/24/24    Continue senna-docusate sodium two tablets before bed per medical team    No associated orders from this encounter found during lookback period of 72 hours.  GERD (gastroesophageal reflux disease)  Reviewed on 10/24/24    Continue famotidine 20 mg tablet BID per medical team   Continue glycopyrrolate 1 mg tablet BID AM and afternoon per medical  team  Continue glycopyrrolate 2 mg tablet before bed per medical team  Continue pantoprazole EC 20 mg tablet every morning per medical team    No associated orders from this encounter found during lookback period of 72 hours.  Medical clearance for psychiatric admission  Reviewed on 10/24/24    Ongoing by medical    No associated orders from this encounter found during lookback period of 72 hours.    Tobacco abuse  Reviewed on 10/24/24    Continue to encourage cessation upon discharge    No associated orders from this encounter found during lookback period of 72 hours.  Elevated hemoglobin A1c  Reviewed on 10/24/24      No associated orders from this encounter found during lookback period of 72 hours.      [facility]  Psychiatric Progress Note - Department of Behavioral Health   Alberto Berumen 29 y.o. male MRN: [id number]  Unit/Bed#: [facility] 112-02 Encounter: [id number]    SUBJECTIVE     Patient evaluated this a.m. for continuity of care. Patient was discussed at length with nursing and treatment team. Per nursing, patient has seemed brighter and more verbal over the past 24 hours. He attended 6 out of 10 groups yesterday. Nursing reports that patient continues to note experiences consistent with auditory hallucinations, as well as feelings of depression and anxiety. His appetite has been poor. He met with the  earlier this week to discuss healthy eating habits, and the treatment team is considering a nutritional consult for the patient.     During evaluation, no acute distress is noted. Alberto Berumen was calm, pleasant, and willing to converse this morning. He was wide awake and alert, but remained laying on his bed at the time of this interview. Patient continues to endorse auditory hallucinations, and notes that their messages ar unchanged- they tell him to kill himself. He denies symptoms consistent with visual hallucinations, and he denies any SI or HI at this time. He  notes persistent depression and anxiety that remain unchanged, and fear and paranoia in regards to the voices. When asked, patient notes that he feels safe on this unit. He says that listening to music and walking around the halls helps with his anxiety. He notes that he slept well last night, and that he talked with his sister yesterday. He was interested in working out yesterday but was unable to due to other commotion on the unit, so he hopes to complete a workout today. He continues to be preoccupied with concerns that he is gaining weight, and therefore has a poor appetite. He states that he has nio plans to attend breakfast this morning.       PSYCHIATRIC REVIEW OF SYSTEMS     Behavior over the last 24 hours:  improved  Sleep: normal  Appetite: poor  Medication side effects: No    Progress Toward Goals: Patient is working towards goals, as evidenced by his participation in individual, social and therapeutic milieu in addition to adherence to his medication regimen.  Patient Acceptance: Patient is accepting of his need for treatment and care.   Patient Understanding: Patient is understanding that he is having AH and other emotions that require treatment.   Patient Involvement in Reintegration Process: Patient continues to be in contact with family members, and is adherent to his treatment regimen.   Patient's Therapeutic Relationship with Psychiatrist: Trusting  Patient's Optimism Regarding Recovery: Patient is attending groups, adhering to treatment regimen, and continuing to remain in contact with family.   Group Attendance: Patient attended 6 out of 10 groups.     REVIEW OF SYSTEMS   Review of systems: no complaints    OBJECTIVE     Vital Signs in Past 24 Hours:  Temp:  [97 °F (36.1 °C)-97.8 °F (36.6 °C)] 97 °F (36.1 °C)  HR:  [100-101] 100  BP: (120-129)/(73-85) 123/85  Resp:  [18] 18  SpO2:  [96 %-99 %] 99 %  O2 Device: None (Room air)    Intake/Output in Past 24 hours:  No intake/output data recorded.  No  intake/output data recorded.        Laboratory Results:  I have personally reviewed all pertinent laboratory/tests results.  Labs in last 72 hours:   Recent Labs     10/22/24  0933   WBC 7.96   RBC 5.71*   HGB 13.5   HCT 43.3      RDW 14.3   NEUTROABS 4.33       Behavioral Health Medications: all current active meds have been reviewed.    Current Facility-Administered Medications   Medication Dose Route Frequency Provider Last Rate    acetaminophen  650 mg Oral Q4H PRN Jordan C Holter, DO      acetaminophen  650 mg Oral Q6H PRN HOLLI Lion      aluminum-magnesium hydroxide-simethicone  30 mL Oral Q4H PRN Jordan C Holter, DO      amLODIPine  5 mg Oral Daily HOLLI Lion      ARIPiprazole  30 mg Oral Daily Bora Rosario MD      Artificial Tears  1 drop Both Eyes Q3H PRN Jordan C Holter, DO      atropine  1 drop Sublingual HS Harjeet Torres,       haloperidol lactate  2.5 mg Intramuscular Q4H PRN Max 4/day HOLLI Lion      And    LORazepam  1 mg Intramuscular Q4H PRN Max 4/day HOLLI Lion      And    benztropine  0.5 mg Intramuscular Q4H PRN Max 4/day HOLLI Lion      benztropine  1 mg Intramuscular Q4H PRN Max 6/day Jordan C Holter, DO      haloperidol lactate  5 mg Intramuscular Q4H PRN Max 4/day HOLLI Lion      And    LORazepam  2 mg Intramuscular Q4H PRN Max 4/day HOLLI Lion      And    benztropine  1 mg Intramuscular Q4H PRN Max 4/day HOLLI Lion      benztropine  1 mg Oral Q4H PRN Max 6/day HOLLI Lion      benztropine  1 mg Oral Q4H PRN Max 6/day Jordan C Holter, DO      bisacodyl  10 mg Rectal Daily PRN HOLLI Lion      calcium carbonate  500 mg Oral BID PRN HOLLI Monge      cloZAPine  225 mg Oral HS Hardik So MD      hydrOXYzine HCL  50 mg Oral Q6H PRN Max 4/day Jordan C Holter,       Or    diphenhydrAMINE  50 mg Intramuscular Q6H PRN Jordan C Holter, DO      hydrOXYzine HCL  50 mg Oral Q6H PRN  Max 4/day HOLLI Lion      Or    diphenhydrAMINE  50 mg Intramuscular Q6H PRN HOLLI Lion      diphenhydrAMINE-zinc acetate   Topical BID PRN HOLLI Lion      escitalopram  20 mg Oral Daily HOLLI Lion      famotidine  20 mg Oral BID PRN EileenHOLLI Cummins      fluticasone  1 spray Each Nare Daily PRN HOLLI Monge      glycopyrrolate  1 mg Oral BID (AM & Afternoon) Bora Rosario MD      glycopyrrolate  2 mg Oral HS Bora Rosario MD      haloperidol  1 mg Oral Q6H PRN EvelineHOLLI Christy      haloperidol  2.5 mg Oral Q4H PRN Max 4/day HOLLI Lion      haloperidol  5 mg Oral Q4H PRN Max 4/day HOLLI Lion      hydroCHLOROthiazide  12.5 mg Oral Daily Pia Mantilla, HOLLI      hydrocortisone   Topical 4x Daily PRN HOLLI Lion      hydrOXYzine HCL  100 mg Oral Q6H PRN Max 4/day Ion C Holter, DO      Or    LORazepam  2 mg Intramuscular Q6H PRN Ion C Holter, DO      hydrOXYzine HCL  100 mg Oral Q6H PRN Max 4/day HOLLI Lion      Or    LORazepam  2 mg Intramuscular Q6H PRN HOLLI Lion      hydrOXYzine HCL  25 mg Oral Q6H PRN Max 4/day Ion C Holter, DO      ibuprofen  600 mg Oral Q8H PRN HOLLI Lion      influenza vaccine  0.5 mL Intramuscular Once Bora Rosario MD      loratadine  10 mg Oral Daily Brina Guillen MD      melatonin  3 mg Oral HS PRN Bora Rosario MD      melatonin  9 mg Oral HS Bora Rosario MD      methocarbamol  500 mg Oral Q6H PRN HOLLI Lion      multivitamin-minerals  1 tablet Oral Daily HOLLI Monge      nicotine polacrilex  2 mg Oral Q4H PRN Bora Rosario MD      OLANZapine  5 mg Oral Q4H PRN Max 3/day Ion C Holter, DO      Or    OLANZapine  2.5 mg Intramuscular Q4H PRN Max 3/day Ion C Holter, DO      OLANZapine  5 mg Oral Q3H PRN Max 3/day Ion C Holter, DO      Or    OLANZapine  5 mg Intramuscular Q3H PRN Max 3/day Jordan C Holter,       OLANZapine  2.5 mg  "Oral Q4H PRN Max 6/day Jordan C Holter, DO      ondansetron  4 mg Oral Q6H PRN HOLLI Lion      pantoprazole  20 mg Oral QAM Jordan C Holter, DO      polyethylene glycol  17 g Oral Daily PRN HOLLI Ghotra      propranolol  10 mg Oral Q12H KAYLIE HOLLI Lion      senna-docusate sodium  1 tablet Oral Daily PRN Jordan C Holter, DO      senna-docusate sodium  2 tablet Oral HS Melvina Ambron, DO      traZODone  50 mg Oral HS PRN HOLLI Lion      white petrolatum-mineral oil   Topical TID PRN HOLLI Lion         Risks, benefits and possible side effects of Medications:   Risks, benefits, and possible side effects of medications explained to patient and patient verbalizes understanding.      Dual Antipsychotic Rationale: Patient is currently using dual antipsychotics due to poor response to previous treatment.      Mental Status Evaluation:  Appearance:  Patient appears stated age, slightly disheveled, and in no acute distress this morning. He is wearing a gray hospital gown and light green hospital pants.    Behavior:  Patient is calm, cooperative, and pleasant this morning. He remains laying in bed at time of this interview, but is wide awake and alert. No stereotyped behaviors are noted.    Speech:  Speech is normal volume, rate, and pitch.    Mood:  \"Okay\"   Affect:  Sad, mood-congruent, appropriate to thought content, stable throughout interview   Thought Process:  organized, logical, coherent, goal directed, linear, normal rate of thoughts   Associations: intact associations   Thought Content:  Patient notes paranoia and fear regarding the voices he is hearing, but denies symptoms consistent with grandiose delusions. Pt denies SI and HI.    Perceptual Disturbances: Patient endorses symptoms consistent with auditory hallucinations, but denies symptoms consistent with visual hallucination. He  does not appear to be responding to internal stimuli at this time.    Risk Potential: Suicidal " ideation -denies  Homicidal ideation - denies  Potential for aggression - Not at present   Sensorium:  oriented to person, place, and time/date   Memory:  recent and remote memory grossly intact   Consciousness:  alert and awake   Attention/Concentration: attention span and concentration are age appropriate   Insight:  fair   Judgment: fair   Gait/Station: unable to assess   Motor Activity: no abnormal movements     Recommended Treatment:   See above for assessment and plan.    Counseling/Coordination of Care    Total unit time spent today was greater than 15 minutes. Greater than 50% of total time was spent with the patient and/or patient's relatives and/or coordination of patient's care.     Patient's rights, confidentiality, exceptions to confidentiality, use of electronic medical record including appropriate staff access to medical record regarding behavioral health services and consent to treatment were reviewed.    Note Share:     This note was not shared with the patient due to reasonable likelihood of causing patient harm     Yamila Lopes   Psychiatry, PA-S

## 2024-10-24 NOTE — ASSESSMENT & PLAN NOTE
Reviewed on 10/24/24      No associated orders from this encounter found during lookback period of 72 hours.

## 2024-10-24 NOTE — ASSESSMENT & PLAN NOTE
Reviewed on 10/24/24    Pt continues to endorse AH that tell him to hill himself. He denies symptoms consistent with VH at this time. He denies SI and HI, and notes that he feels safe with the care team on this unit. He notes persistent feelings of depression and anxiety, as well as fear and paranoia regarding the voices. He has had a poor appetite, which nursing reports is associated with his fear of weight gain. He notes good sleep. He denies symptoms consistent with grandiose delusions. He does not appear to be responding to internal stimuli at the time of this interview.     Continue Clozapine 225 mg qhs for psychosis -- to consider further careful titration for Sxs depending on response  Continue to obtain CBC with diff, CRP, and CK (weekly on Tues), and a Clozapine level and BNP (every 2 weeks on Tues)    Continue Abilify 30 mg qd for psychosis  Continue Escitalopram 20 mg qd for depression and anxiety  Continue Senna-Docusate sodium two 50 mg tablets daily before bed for constipation  Continue propanolol 10 mg bid for anxiety   Continue amlodipine 5 mg PO daily     Pt remains on dual antipsychotic Tx due to failure on monotherapy      ACT team referral was made    No associated orders from this encounter found during lookback period of 72 hours.

## 2024-10-24 NOTE — PLAN OF CARE
Problem: Alteration in Thoughts and Perception  Goal: Treatment Goal: Gain control of psychotic behaviors/thinking, reduce/eliminate presenting symptoms and demonstrate improved reality functioning upon discharge  Outcome: Progressing  Goal: Verbalize thoughts and feelings  Description: Interventions:  - Promote a nonjudgmental and trusting relationship with the patient through active listening and therapeutic communication  - Assess patient's level of functioning, behavior and potential for risk  - Engage patient in 1 on 1 interactions  - Encourage patient to express fears, feelings, frustrations, and discuss symptoms    - [place] patient to reality, help patient recognize reality-based thinking   - Administer medications as ordered and assess for potential side effects  - Provide the patient education related to the signs and symptoms of the illness and desired effects of prescribed medications  Outcome: Progressing  Goal: Agree to be compliant with medication regime, as prescribed and report medication side effects  Description: Interventions:  - Offer appropriate PRN medication and supervise ingestion; conduct AIMS, as needed   Outcome: Progressing  Goal: Attend and participate in unit activities, including therapeutic, recreational, and educational groups  Description: Interventions:  -Encourage Visitation and family involvement in care  Outcome: Progressing  Goal: Complete daily ADLs, including personal hygiene independently, as able  Description: Interventions:  - Observe, teach, and assist patient with ADLS  - Monitor and promote a balance of rest/activity, with adequate nutrition and elimination   Outcome: Progressing     Problem: Depression  Goal: Treatment Goal: Demonstrate behavioral control of depressive symptoms, verbalize feelings of improved mood/affect, and adopt new coping skills prior to discharge  Outcome: Progressing  Goal: Verbalize thoughts and feelings  Description: Interventions:  - Assess  and re-assess patient's level of risk   - Engage patient in 1:1 interactions, daily, for a minimum of 15 minutes   - Encourage patient to express feelings, fears, frustrations, hopes   Outcome: Progressing  Goal: Refrain from harming self  Description: Interventions:  - Monitor patient closely, per order   - Supervise medication ingestion, monitor effects and side effects   Outcome: Progressing  Goal: Attend and participate in unit activities, including therapeutic, recreational, and educational groups  Description: Interventions:  - Provide therapeutic and educational activities daily, encourage attendance and participation, and document same in the medical record   Outcome: Progressing  Goal: Complete daily ADLs, including personal hygiene independently, as able  Description: Interventions:  - Observe, teach, and assist patient with ADLS  -  Monitor and promote a balance of rest/activity, with adequate nutrition and elimination   Outcome: Progressing     Problem: Alteration in Orientation  Goal: Interact with staff daily  Description: Interventions:  - Assess and re-assess patient's level of orientation  - Engage patient in 1 on 1 interactions, daily, for a minimum of 15 minutes   - Establish rapport/trust with patient   Outcome: Progressing  Goal: Attend and participate in unit activities, including therapeutic, recreational, and educational groups  Description: Interventions:  - Provide therapeutic and educational activities daily, encourage attendance and participation, and document same in the medical record   - Provide appropriate opportunities for reminiscence   - Provide a consistent daily routine   - Encourage family contact/visitation   Outcome: Progressing  Goal: Complete daily ADLs, including personal hygiene independently, as able  Description: Interventions:  - Observe, teach, and assist patient with ADLS  Outcome: Progressing

## 2024-10-24 NOTE — ASSESSMENT & PLAN NOTE
Reviewed on 10/24/24    Continue senna-docusate sodium two tablets before bed per medical team    No associated orders from this encounter found during lookback period of 72 hours.

## 2024-10-25 PROCEDURE — 99232 SBSQ HOSP IP/OBS MODERATE 35: CPT | Performed by: PSYCHIATRY & NEUROLOGY

## 2024-10-25 RX ADMIN — MELATONIN TAB 3 MG 9 MG: 3 TAB at 21:20

## 2024-10-25 RX ADMIN — NICOTINE POLACRILEX 2 MG: 2 GUM, CHEWING ORAL at 17:23

## 2024-10-25 RX ADMIN — NICOTINE POLACRILEX 2 MG: 2 GUM, CHEWING ORAL at 21:23

## 2024-10-25 RX ADMIN — GLYCOPYRROLATE 2 MG: 1 TABLET ORAL at 21:20

## 2024-10-25 RX ADMIN — SENNOSIDES AND DOCUSATE SODIUM 2 TABLET: 8.6; 5 TABLET ORAL at 21:19

## 2024-10-25 RX ADMIN — NICOTINE POLACRILEX 2 MG: 2 GUM, CHEWING ORAL at 08:40

## 2024-10-25 RX ADMIN — PROPRANOLOL HYDROCHLORIDE 10 MG: 10 TABLET ORAL at 08:40

## 2024-10-25 RX ADMIN — HYDROCHLOROTHIAZIDE 12.5 MG: 12.5 TABLET ORAL at 08:39

## 2024-10-25 RX ADMIN — GLYCOPYRROLATE 1 MG: 1 TABLET ORAL at 14:49

## 2024-10-25 RX ADMIN — PANTOPRAZOLE SODIUM 20 MG: 20 TABLET, DELAYED RELEASE ORAL at 08:40

## 2024-10-25 RX ADMIN — LORATADINE 10 MG: 10 TABLET ORAL at 08:41

## 2024-10-25 RX ADMIN — PROPRANOLOL HYDROCHLORIDE 10 MG: 10 TABLET ORAL at 21:19

## 2024-10-25 RX ADMIN — ESCITALOPRAM OXALATE 20 MG: 10 TABLET, FILM COATED ORAL at 08:40

## 2024-10-25 RX ADMIN — ARIPIPRAZOLE 30 MG: 15 TABLET ORAL at 08:39

## 2024-10-25 RX ADMIN — CLOZAPINE 225 MG: 25 TABLET ORAL at 21:19

## 2024-10-25 RX ADMIN — GLYCOPYRROLATE 1 MG: 1 TABLET ORAL at 08:40

## 2024-10-25 RX ADMIN — DIPHENHYDRAMINE HYDROCHLORIDE, ZINC ACETATE: 2; .1 CREAM TOPICAL at 17:23

## 2024-10-25 RX ADMIN — ATROPINE SULFATE 1 DROP: 10 SOLUTION/ DROPS OPHTHALMIC at 21:20

## 2024-10-25 RX ADMIN — MULTIPLE VITAMINS W/ MINERALS TAB 1 TABLET: TAB ORAL at 08:40

## 2024-10-25 NOTE — PLAN OF CARE
Problem: Alteration in Thoughts and Perception  Goal: Verbalize thoughts and feelings  Description: Interventions:  - Promote a nonjudgmental and trusting relationship with the patient through active listening and therapeutic communication  - Assess patient's level of functioning, behavior and potential for risk  - Engage patient in 1 on 1 interactions  - Encourage patient to express fears, feelings, frustrations, and discuss symptoms    - [place] patient to reality, help patient recognize reality-based thinking   - Administer medications as ordered and assess for potential side effects  - Provide the patient education related to the signs and symptoms of the illness and desired effects of prescribed medications  Outcome: Progressing  Goal: Refrain from acting on delusional thinking/internal stimuli  Description: Interventions:  - Monitor patient closely, per order   - Utilize least restrictive measures   - Set reasonable limits, give positive feedback for acceptable   - Administer medications as ordered and monitor of potential side effects  Outcome: Progressing  Goal: Agree to be compliant with medication regime, as prescribed and report medication side effects  Description: Interventions:  - Offer appropriate PRN medication and supervise ingestion; conduct AIMS, as needed   Outcome: Progressing  Goal: Complete daily ADLs, including personal hygiene independently, as able  Description: Interventions:  - Observe, teach, and assist patient with ADLS  - Monitor and promote a balance of rest/activity, with adequate nutrition and elimination   Outcome: Progressing     Problem: Ineffective Coping  Goal: Identifies ineffective coping skills  Outcome: Progressing  Goal: Identifies healthy coping skills  Outcome: Progressing  Goal: Participates in unit activities  Description: Interventions:  - Provide therapeutic environment   - Provide required programming   - Redirect inappropriate behaviors   Outcome: Progressing      Problem: Depression  Goal: Refrain from harming self  Description: Interventions:  - Monitor patient closely, per order   - Supervise medication ingestion, monitor effects and side effects   Outcome: Progressing  Goal: Refrain from isolation  Description: Interventions:  - Develop a trusting relationship   - Encourage socialization   Outcome: Progressing  Goal: Refrain from self-neglect  Outcome: Progressing     Problem: Anxiety  Goal: Anxiety is at manageable level  Description: Interventions:  - Assess and monitor patient's anxiety level.   - Monitor for signs and symptoms (heart palpitations, chest pain, shortness of breath, headaches, nausea, feeling jumpy, restlessness, irritable, apprehensive).   - Collaborate with interdisciplinary team and initiate plan and interventions as ordered.  - [place] patient to unit/surroundings  - Explain treatment plan  - Encourage participation in care  - Encourage verbalization of concerns/fears  - Identify coping mechanisms  - Assist in developing anxiety-reducing skills  - Administer/offer alternative therapies  - Limit or eliminate stimulants  Outcome: Progressing

## 2024-10-25 NOTE — ASSESSMENT & PLAN NOTE
Reviewed on 10/25/24    Continue to encourage cessation upon discharge    No associated orders from this encounter found during lookback period of 72 hours.

## 2024-10-25 NOTE — NURSING NOTE
"Alberto was visible and brightens on approach. He continues to admit to .as well as anxiety and depression which are \" the same as usual\"   He made several phone calls; was on the phone quite a bit this evening. He had no complaints; he was social with several peers  "

## 2024-10-25 NOTE — ASSESSMENT & PLAN NOTE
Reviewed on 10/25/24    Ongoing by medical    No associated orders from this encounter found during lookback period of 72 hours.

## 2024-10-25 NOTE — NURSING NOTE
Pt in bed asleep, observed eyes closed, and chest movement noted. 15 minute safety checks maintained. No unmet needs at this time. Will continue to monitor patient needs, sleep pattern, and behaviors.     0623: Patient has slept all night, 7+ hours of uninterrupted sleep

## 2024-10-25 NOTE — ASSESSMENT & PLAN NOTE
Reviewed on 10/25/24    Continue famotidine 20 mg tablet BID per medical team   Continue glycopyrrolate 1 mg tablet BID AM and afternoon per medical team  Continue glycopyrrolate 2 mg tablet before bed per medical team  Continue pantoprazole EC 20 mg tablet every morning per medical team    No associated orders from this encounter found during lookback period of 72 hours.

## 2024-10-25 NOTE — SOCIAL WORK
This writer sent an email to Monica Griffin with [employer] requesting they expedite patient's [facility] funding assistance for ACT services as the patient's ready for discharge.

## 2024-10-25 NOTE — PROGRESS NOTES
Progress Note - Behavioral Health   Name: Alberto Berumen 29 y.o. male I MRN: [id number]  Unit/Bed#: [facility] 112-02 I Date of Admission: 3/29/2024   Date of Service: 10/25/2024 I Hospital Day: 210     Assessment & Plan  Schizoaffective disorder, bipolar type (HCC)  Reviewed on 10/25/24    Pt continues to endorse AH that tell him that they are going to kill him. He denies symptoms consistent with VH at this time. He denies SI and HI. He notes persistent feelings of depression and anxiety, as well as fear and paranoia regarding the voices. His appetite has improved in the last 24 hours and he continues to note good sleep. He denies symptoms consistent with grandiose delusions. He does not appear to be responding to internal stimuli at the time of this interview.     Continue Clozapine 225 mg qhs for psychosis -- to consider further careful titration for Sxs depending on response  Continue to obtain CBC with diff, CRP, and CK (weekly on Tues), and a Clozapine level and BNP (every 2 weeks on Tues)    Continue Abilify 30 mg qd for psychosis  Continue Escitalopram 20 mg qd for depression and anxiety  Continue Senna-Docusate sodium two 50 mg tablets daily before bed for constipation  Continue propanolol 10 mg bid for anxiety   Continue amlodipine 5 mg PO daily     Pt remains on dual antipsychotic Tx due to failure on monotherapy      ACT team referral was made    No associated orders from this encounter found during lookback period of 72 hours.    Chronic idiopathic constipation  Reviewed on 10/25/24    Continue senna-docusate sodium two tablets before bed per medical team    No associated orders from this encounter found during lookback period of 72 hours.  GERD (gastroesophageal reflux disease)  Reviewed on 10/25/24    Continue famotidine 20 mg tablet BID per medical team   Continue glycopyrrolate 1 mg tablet BID AM and afternoon per medical team  Continue glycopyrrolate 2 mg tablet before bed per medical team  Continue  pantoprazole EC 20 mg tablet every morning per medical team    No associated orders from this encounter found during lookback period of 72 hours.  Medical clearance for psychiatric admission  Reviewed on 10/25/24    Ongoing by medical    No associated orders from this encounter found during lookback period of 72 hours.    Tobacco abuse  Reviewed on 10/25/24    Continue to encourage cessation upon discharge    No associated orders from this encounter found during lookback period of 72 hours.  Elevated hemoglobin A1c  Reviewed on 10/25/24      No associated orders from this encounter found during lookback period of 72 hours.      [facility]  Psychiatric Progress Note - Department of Behavioral Health   Alberto Berumen 29 y.o. male MRN: [id number]  Unit/Bed#: [facility] 112-02 Encounter: [id number]    SUBJECTIVE     Patient evaluated this a.m. for continuity of care. Patient was discussed at length with nursing and treatment team. Per nursing, patient has been pleasant, with improved group attendance (8 out of 9) and improved appetite (meals yesterday: 0/100/100). Nursing reports that patient worked out yesterday. He continues to endorse AH, depression, and anxiety. Treatment team notes that they are waiting for [facility] for ACT services.     During evaluation, no acute distress is noted. Alberto Berumen remained in his bed and refused to fully wake up at the time of this interview. He was cooperative with responding to questions. He notes persistent and unchanged feelings of depression and anxiety, as well as fear in regards to the auditory hallucinations he is experiencing. He denies any changes to the voices, noting that they are telling him that they are going to kill him. He notes that he slept well yesterday and states that he did not wake up with any anxiety symptoms throughout the night. He notes that he was able to workout yesterday, and hopes to again today. He had an improved  "appetite yesterday, but did not wake up for breakfast this morning. He denies any symptoms consistent with visual hallucinations, and he does not appear to be responding to internal stimuli at the time of this interview. He denies any SI and HI at this time.       PSYCHIATRIC REVIEW OF SYSTEMS     Behavior over the last 24 hours:  improved  Sleep: normal  Appetite: normal  Medication side effects: No    Progress Toward Goals: Patient is making positive progress towards goals, as evidenced by his participation in individual, social and therapeutic milieu in addition to adherence to his medication regimen.  Patient Acceptance: Patient is accepting of and cooperative with his treatment regimen.   Patient Understanding: Patient continues to be honest about the symptoms he is experiencing.   Patient Involvement in Reintegration Process: Patient is in contact with his family, and continues to adhere to the treatment regimen.   Patient's Therapeutic Relationship with Psychiatrist: Trusting  Patient's Optimism Regarding Recovery: Patient remains active in social groups and adherent to treatment, as well as in contact with family.   Group Attendance: Patient attended 8 out of 9 groups yesterday.     REVIEW OF SYSTEMS   Review of systems: no complaints    OBJECTIVE     Vital Signs in Past 24 Hours:  Temp:  [98.1 °F (36.7 °C)] 98.1 °F (36.7 °C)  HR:  [] 88  BP: (117-129)/(58-83) 117/58  Resp:  [19] 19  SpO2:  [98 %] 98 %  O2 Device: None (Room air)    Intake/Output in Past 24 hours:  No intake/output data recorded.  No intake/output data recorded.        Laboratory Results:  I have personally reviewed all pertinent laboratory/tests results.  Labs in last 72 hours: No results for input(s): \"WBC\", \"RBC\", \"HGB\", \"HCT\", \"PLT\", \"RDW\", \"TOTANEUTABS\", \"NEUTROABS\", \"SODIUM\", \"K\", \"CL\", \"CO2\", \"BUN\", \"CREATININE\", \"GLUC\", \"GLUF\", \"CALCIUM\", \"AST\", \"ALT\", \"ALKPHOS\", \"TP\", \"ALB\", \"TBILI\", \"CHOLESTEROL\", \"HDL\", \"TRIG\", \"LDLCALC\", " "\"VALPROICTOT\", \"CARBAMAZEPIN\", \"LITHIUM\", \"AMMONIA\", \"MRP4AGUCRWWI\", \"FREET4\", \"T3FREE\", \"PREGTESTUR\", \"PREGSERUM\", \"HCG\", \"HCGQUANT\", \"RPR\" in the last 72 hours.    Behavioral Health Medications: all current active meds have been reviewed.    Current Facility-Administered Medications   Medication Dose Route Frequency Provider Last Rate    acetaminophen  650 mg Oral Q4H PRN Jordan C Holter, DO      acetaminophen  650 mg Oral Q6H PRN HOLLI Lion      aluminum-magnesium hydroxide-simethicone  30 mL Oral Q4H PRN Jordan C Holter, DO      amLODIPine  5 mg Oral Daily HOLLI Lion      ARIPiprazole  30 mg Oral Daily Bora Rosario MD      Artificial Tears  1 drop Both Eyes Q3H PRN Jordan C Holter, DO      atropine  1 drop Sublingual HS Harjeet Torres DO      haloperidol lactate  2.5 mg Intramuscular Q4H PRN Max 4/day OHLLI Lion      And    LORazepam  1 mg Intramuscular Q4H PRN Max 4/day HOLLI Lion      And    benztropine  0.5 mg Intramuscular Q4H PRN Max 4/day HOLLI Lion      benztropine  1 mg Intramuscular Q4H PRN Max 6/day Jordan C Holter, DO      haloperidol lactate  5 mg Intramuscular Q4H PRN Max 4/day HOLLI Lion      And    LORazepam  2 mg Intramuscular Q4H PRN Max 4/day HOLLI Lion      And    benztropine  1 mg Intramuscular Q4H PRN Max 4/day HOLLI Lion      benztropine  1 mg Oral Q4H PRN Max 6/day HOLLI Lion      benztropine  1 mg Oral Q4H PRN Max 6/day Jordan C Holter, DO      bisacodyl  10 mg Rectal Daily PRN HOLLI Lion      calcium carbonate  500 mg Oral BID PRN HOLLI Monge      cloZAPine  225 mg Oral HS Hardki So MD      hydrOXYzine HCL  50 mg Oral Q6H PRN Max 4/day Ion C Holter, DO      Or    diphenhydrAMINE  50 mg Intramuscular Q6H PRN Ion Dupontter,       hydrOXYzine HCL  50 mg Oral Q6H PRN Max 4/day HOLLI Lion      Or    diphenhydrAMINE  50 mg Intramuscular Q6H PRN HOLLI Lion  "     diphenhydrAMINE-zinc acetate   Topical BID PRN HOLLI Lion      escitalopram  20 mg Oral Daily HOLLI Lion      famotidine  20 mg Oral BID PRN HOLLI Monge      fluticasone  1 spray Each Nare Daily PRN HOLLI Monge      glycopyrrolate  1 mg Oral BID (AM & Afternoon) Bora Rosario MD      glycopyrrolate  2 mg Oral HS Bora Rosario MD      haloperidol  1 mg Oral Q6H PRN HOLLI Lion      haloperidol  2.5 mg Oral Q4H PRN Max 4/day HOLLI Lion      haloperidol  5 mg Oral Q4H PRN Max 4/day HOLLI Lion      hydroCHLOROthiazide  12.5 mg Oral Daily Pia Mantilla, HOLLI      hydrocortisone   Topical 4x Daily PRN HOLLI Lion      hydrOXYzine HCL  100 mg Oral Q6H PRN Max 4/day Ion C Holter, DO      Or    LORazepam  2 mg Intramuscular Q6H PRN [facility] Holter, DO      hydrOXYzine HCL  100 mg Oral Q6H PRN Max 4/day HOLLI Lion      Or    LORazepam  2 mg Intramuscular Q6H PRN HOLLI Lion      hydrOXYzine HCL  25 mg Oral Q6H PRN Max 4/day Ion C Holter, DO      ibuprofen  600 mg Oral Q8H PRN HOLLI Lion      influenza vaccine  0.5 mL Intramuscular Once Bora Rosario MD      loratadine  10 mg Oral Daily Brina Guillen MD      melatonin  3 mg Oral HS PRN Bora Rosario MD      melatonin  9 mg Oral HS Bora Rosario MD      methocarbamol  500 mg Oral Q6H PRN HOLLI Lion      multivitamin-minerals  1 tablet Oral Daily HOLLI Monge      nicotine polacrilex  2 mg Oral Q4H PRN Bora Rosario MD      OLANZapine  5 mg Oral Q4H PRN Max 3/day Ion C Holter, DO      Or    OLANZapine  2.5 mg Intramuscular Q4H PRN Max 3/day Ion C Holter, DO      OLANZapine  5 mg Oral Q3H PRN Max 3/day Ion C Holter, DO      Or    OLANZapine  5 mg Intramuscular Q3H PRN Max 3/day Ion C Holter, DO      OLANZapine  2.5 mg Oral Q4H PRN Max 6/day Ion C Holter,       ondansetron  4 mg Oral Q6H PRN HOLLI Lion       "pantoprazole  20 mg Oral QAM Jordan C Holter, DO      polyethylene glycol  17 g Oral Daily PRN HOLLI Ghotra      propranolol  10 mg Oral Q12H KAYLIE HOLLI Lion      senna-docusate sodium  1 tablet Oral Daily PRN Jordan C Holter, DO      senna-docusate sodium  2 tablet Oral HS Melvina De Jesus, DO      traZODone  50 mg Oral HS PRN HOLLI Lion      white petrolatum-mineral oil   Topical TID PRN HOLLI Lion         Risks, benefits and possible side effects of Medications:   Risks, benefits, and possible side effects of medications explained to patient and patient verbalizes understanding.      Dual Antipsychotic Rationale: Due to inefficacy of prior treatment.      Mental Status Evaluation:  Appearance:  Patient remains half-asleep throughout this interview, laying on his bed, wrapped in a white blanket with his eyes closed. He appears in no acute distress.    Behavior:  Patient remains laying in bed throughout this interview and refuses to fully wake up. He is cooperative in responding to questions.    Speech:  Speech is coherent, linear, and goal-directed, but short in answers and soft.    Mood:  \"Tired\"    Affect:  Affect is constricted, mood-congruent, appropriate to thought content, stable throughout interview.    Thought Process:  organized, logical, coherent, goal directed, linear   Associations: intact associations   Thought Content:  Patient notes paranoia and feelings that the voices are out to get him, but denies symptoms consistent with any grandiose delusions. He denies SI and HI.    Perceptual Disturbances: Patient endorses symptoms consistent with auditory hallucinations, but denies VH at this time. He does not appear to be responding to internal stimuli at the time of this interview.    Risk Potential: Suicidal ideation - denies  Homicidal ideation - denies  Potential for aggression - Not at present   Sensorium:  oriented to person, place, and time/date   Memory:  recent and " remote memory grossly intact   Consciousness:  sedated, still sleepy   Attention/Concentration: attention span and concentration are age appropriate   Insight:  fair   Judgment: fair   Gait/Station: unable to assess   Motor Activity: no abnormal movements     Recommended Treatment:   See above for assessment and plan.    Counseling/Coordination of Care    Total unit time spent today was greater than 15 minutes. Greater than 50% of total time was spent with the patient and/or patient's relatives and/or coordination of patient's care.     Patient's rights, confidentiality, exceptions to confidentiality, use of electronic medical record including appropriate staff access to medical record regarding behavioral health services and consent to treatment were reviewed.    Note Share:     This note was not shared with the patient due to reasonable likelihood of causing patient harm     Yamila Lopes   Psychiatry, PA-S

## 2024-10-25 NOTE — ASSESSMENT & PLAN NOTE
Reviewed on 10/25/24    Continue senna-docusate sodium two tablets before bed per medical team    No associated orders from this encounter found during lookback period of 72 hours.   yes

## 2024-10-25 NOTE — PROGRESS NOTES
10/25/24 0806   Team Meeting   Meeting Type Daily Rounds   Team Members Present   Team Members Present Physician;Nurse;;Other (Discipline and Name)   Physician Team Member Holter, Thomas   Nursing Team Member Claudia   Social Work Team Member Jose J ORTEGA   Other (Discipline and Name) Kathleen PCM   Patient/Family Present   Patient Present No   Patient's Family Present No   OTHER   Team Meeting - Additional Comments Wang PCM     Groups Participation  6/9.   He's compliant with medications. He's pleasant. He reports depression and some AH telling him he will die. D/C home with [facility] services. Improved appetite. Utilizes coping skills.

## 2024-10-25 NOTE — NURSING NOTE
"Alberto was calm and cooperative during assessment. He denies SI, SH, HI, and VH, and reports AH of a voice \"telling me it will kill me and my family.\" He reports walking, talking to peers, and listening to music helps him ignore the AH. He was social in the milieu and attended group programming. He attended meals in the milieu. He was medication compliant and requested PRN nicorette gum and medicated cream applied to an itchy dry patch on his neck. He denies new concerns at this time.  "

## 2024-10-25 NOTE — ASSESSMENT & PLAN NOTE
Reviewed on 10/25/24    Pt continues to endorse AH that tell him that they are going to kill him. He denies symptoms consistent with VH at this time. He denies SI and HI. He notes persistent feelings of depression and anxiety, as well as fear and paranoia regarding the voices. His appetite has improved in the last 24 hours and he continues to note good sleep. He denies symptoms consistent with grandiose delusions. He does not appear to be responding to internal stimuli at the time of this interview.     Continue Clozapine 225 mg qhs for psychosis -- to consider further careful titration for Sxs depending on response  Continue to obtain CBC with diff, CRP, and CK (weekly on Tues), and a Clozapine level and BNP (every 2 weeks on Tues)    Continue Abilify 30 mg qd for psychosis  Continue Escitalopram 20 mg qd for depression and anxiety  Continue Senna-Docusate sodium two 50 mg tablets daily before bed for constipation  Continue propanolol 10 mg bid for anxiety   Continue amlodipine 5 mg PO daily     Pt remains on dual antipsychotic Tx due to failure on monotherapy      ACT team referral was made    No associated orders from this encounter found during lookback period of 72 hours.

## 2024-10-25 NOTE — ASSESSMENT & PLAN NOTE
Reviewed on 10/25/24      No associated orders from this encounter found during lookback period of 72 hours.

## 2024-10-26 PROCEDURE — 99232 SBSQ HOSP IP/OBS MODERATE 35: CPT | Performed by: PSYCHIATRY & NEUROLOGY

## 2024-10-26 RX ADMIN — NICOTINE POLACRILEX 2 MG: 2 GUM, CHEWING ORAL at 13:16

## 2024-10-26 RX ADMIN — SENNOSIDES AND DOCUSATE SODIUM 2 TABLET: 8.6; 5 TABLET ORAL at 21:10

## 2024-10-26 RX ADMIN — MULTIPLE VITAMINS W/ MINERALS TAB 1 TABLET: TAB ORAL at 09:02

## 2024-10-26 RX ADMIN — ESCITALOPRAM OXALATE 20 MG: 10 TABLET, FILM COATED ORAL at 09:00

## 2024-10-26 RX ADMIN — GLYCOPYRROLATE 1 MG: 1 TABLET ORAL at 13:13

## 2024-10-26 RX ADMIN — MELATONIN TAB 3 MG 9 MG: 3 TAB at 21:07

## 2024-10-26 RX ADMIN — HYDROCHLOROTHIAZIDE 12.5 MG: 12.5 TABLET ORAL at 09:02

## 2024-10-26 RX ADMIN — GLYCOPYRROLATE 1 MG: 1 TABLET ORAL at 09:03

## 2024-10-26 RX ADMIN — PANTOPRAZOLE SODIUM 20 MG: 20 TABLET, DELAYED RELEASE ORAL at 09:02

## 2024-10-26 RX ADMIN — NICOTINE POLACRILEX 2 MG: 2 GUM, CHEWING ORAL at 09:16

## 2024-10-26 RX ADMIN — PROPRANOLOL HYDROCHLORIDE 10 MG: 10 TABLET ORAL at 21:12

## 2024-10-26 RX ADMIN — NICOTINE POLACRILEX 2 MG: 2 GUM, CHEWING ORAL at 21:28

## 2024-10-26 RX ADMIN — ATROPINE SULFATE 1 DROP: 10 SOLUTION/ DROPS OPHTHALMIC at 21:06

## 2024-10-26 RX ADMIN — NICOTINE POLACRILEX 2 MG: 2 GUM, CHEWING ORAL at 17:25

## 2024-10-26 RX ADMIN — GLYCOPYRROLATE 2 MG: 1 TABLET ORAL at 21:10

## 2024-10-26 RX ADMIN — CLOZAPINE 225 MG: 25 TABLET ORAL at 21:06

## 2024-10-26 RX ADMIN — Medication: at 09:16

## 2024-10-26 RX ADMIN — AMLODIPINE BESYLATE 5 MG: 5 TABLET ORAL at 09:01

## 2024-10-26 RX ADMIN — PROPRANOLOL HYDROCHLORIDE 10 MG: 10 TABLET ORAL at 09:02

## 2024-10-26 RX ADMIN — LORATADINE 10 MG: 10 TABLET ORAL at 09:02

## 2024-10-26 RX ADMIN — ARIPIPRAZOLE 30 MG: 15 TABLET ORAL at 09:01

## 2024-10-26 NOTE — ASSESSMENT & PLAN NOTE
Reviewed on 10/26/24  Follows with medical  No associated orders from this encounter found during lookback period of 72 hours.

## 2024-10-26 NOTE — ASSESSMENT & PLAN NOTE
Reviewed on 10/25/24  Follows with medical  Continue senna-docusate sodium two tablets before bed per medical team  No associated orders from this encounter found during lookback period of 72 hours.

## 2024-10-26 NOTE — NURSING NOTE
"Pt is calm, cooperative and visible on unit. Pt reports depression, anxiety and reports AH are \"getting better.\" denies SI/HI/AH/VH. Compliant with scheduled medications. Appetite improved, pt consumed 50% of lunch. Social with staff and peers. Q 15 min checks maintained.   "

## 2024-10-26 NOTE — ASSESSMENT & PLAN NOTE
Reviewed on 10/26/24  Follows with medical  Continue famotidine 20 mg tablet BID per medical team   Continue glycopyrrolate 1 mg tablet BID AM and afternoon per medical team  Continue glycopyrrolate 2 mg tablet before bed per medical team  Continue pantoprazole EC 20 mg tablet every morning per medical team  No associated orders from this encounter found during lookback period of 72 hours.

## 2024-10-26 NOTE — NURSING NOTE
Alberto has been visible out and about on the unit. Social with staff and select peers. Admits to anxiety, depression and voices. Pleasant and cooperative upon approach. Attended Coping Skills and Wrap Up group. Took medication without difficulty. No issues or behaviors. Continue to monitor. Precautions maintained.

## 2024-10-26 NOTE — ASSESSMENT & PLAN NOTE
Reviewed on 10/26/24  Continue to encourage cessation upon discharge  No associated orders from this encounter found during lookback period of 72 hours.

## 2024-10-26 NOTE — NURSING NOTE
Slept well throughout the night. (8 hours). Appears to be resting comfortably.  Eyes closed and chest movements noted.

## 2024-10-26 NOTE — ASSESSMENT & PLAN NOTE
Reviewed on 10/26/24  Ongoing by medical  No associated orders from this encounter found during lookback period of 72 hours.

## 2024-10-26 NOTE — PROGRESS NOTES
This note was not shared with the patient due to reasonable likelihood of causing patient harm    Progress Note - Behavioral Health   Name: Alberto Bright y.o. male I MRN: [id number]  Unit/Bed#: [facility] 112-02 I Date of Admission: 3/29/2024   Date of Service: 10/26/2024 I Hospital Day: 211    Alberto Bright y.o. male MRN: [id number]   Unit/Bed#: [facility] 112-02 Encounter: [id number]        Assessment & Plan  Schizoaffective disorder, bipolar type (HCC)  Reviewed on 10/26/24    Pt reports depression and mild anxiety today, continues to endorse AH with commands to hurt self and others but states they are easier to ignore. Contracts for safety on unit. Denies paranoia today. Denies SI/HI/VH. Nursing reports reduced appetite with average 1 meal per day.    Continue Clozapine 225 mg qhs for psychosis -- to consider further careful titration for Sxs depending on response  Continue to obtain CBC with diff, CRP, and CK (weekly on Tues), and a Clozapine level and BNP (every 2 weeks on Tues)    Continue Abilify 30 mg qd for psychosis  Continue Escitalopram 20 mg qd for depression and anxiety  Continue Senna-Docusate sodium two 50 mg tablets daily before bed for constipation  Continue propanolol 10 mg bid for anxiety   Continue amlodipine 5 mg PO daily     Pt remains on dual antipsychotic Tx due to failure on monotherapy      ACT team referral was made  No associated orders from this encounter found during lookback period of 72 hours.    Chronic idiopathic constipation  Reviewed on 10/25/24  Follows with medical  Continue senna-docusate sodium two tablets before bed per medical team  No associated orders from this encounter found during lookback period of 72 hours.  GERD (gastroesophageal reflux disease)  Reviewed on 10/26/24  Follows with medical  Continue famotidine 20 mg tablet BID per medical team   Continue glycopyrrolate 1 mg tablet BID AM and afternoon per medical team  Continue glycopyrrolate 2 mg tablet before bed  per medical team  Continue pantoprazole EC 20 mg tablet every morning per medical team  No associated orders from this encounter found during lookback period of 72 hours.  Medical clearance for psychiatric admission  Reviewed on 10/26/24  Ongoing by medical  No associated orders from this encounter found during lookback period of 72 hours.    Tobacco abuse  Reviewed on 10/26/24  Continue to encourage cessation upon discharge  No associated orders from this encounter found during lookback period of 72 hours.  Elevated hemoglobin A1c  Reviewed on 10/26/24  Follows with medical  No associated orders from this encounter found during lookback period of 72 hours.     Current medications:  Current Facility-Administered Medications   Medication Dose Route Frequency Provider Last Rate    acetaminophen  650 mg Oral Q4H PRN Jordan C Holter, DO      acetaminophen  650 mg Oral Q6H PRN HOLLI Lion      aluminum-magnesium hydroxide-simethicone  30 mL Oral Q4H PRN Jordan C Holter,       amLODIPine  5 mg Oral Daily HOLLI Lion      ARIPiprazole  30 mg Oral Daily Bora Rosario MD      Artificial Tears  1 drop Both Eyes Q3H PRN Jordan C Holter, DO      atropine  1 drop Sublingual HS Harjeet Torres, DO      haloperidol lactate  2.5 mg Intramuscular Q4H PRN Max 4/day HOLLI Lion      And    LORazepam  1 mg Intramuscular Q4H PRN Max 4/day HOLLI Lion      And    benztropine  0.5 mg Intramuscular Q4H PRN Max 4/day HOLLI Lion      benztropine  1 mg Intramuscular Q4H PRN Max 6/day Jordan C Holter, DO      haloperidol lactate  5 mg Intramuscular Q4H PRN Max 4/day HOLLI Lion      And    LORazepam  2 mg Intramuscular Q4H PRN Max 4/day HOLLI Lion      And    benztropine  1 mg Intramuscular Q4H PRN Max 4/day HOLLI Lion      benztropine  1 mg Oral Q4H PRN Max 6/day HOLLI Lion      benztropine  1 mg Oral Q4H PRN Max 6/day Jordan C Holter, DO      bisacodyl  10 mg Rectal Daily  PRN HOLLI Lion      calcium carbonate  500 mg Oral BID PRN HOLLI Monge      cloZAPine  225 mg Oral HS Hardik So MD      hydrOXYzine HCL  50 mg Oral Q6H PRN Max 4/day Ion Dupontter, DO      Or    diphenhydrAMINE  50 mg Intramuscular Q6H PRN Jordan C Holter, DO      hydrOXYzine HCL  50 mg Oral Q6H PRN Max 4/day HOLLI Lion      Or    diphenhydrAMINE  50 mg Intramuscular Q6H PRN HOLLI Lion      diphenhydrAMINE-zinc acetate   Topical BID PRN HOLLI Lion      escitalopram  20 mg Oral Daily HOLLI Lion      famotidine  20 mg Oral BID PRN HOLLI Monge      fluticasone  1 spray Each Nare Daily PRN HOLLI Monge      glycopyrrolate  1 mg Oral BID (AM & Afternoon) Bora Rosario MD      glycopyrrolate  2 mg Oral HS Bora Rosario MD      haloperidol  1 mg Oral Q6H PRN HOLLI Lion      haloperidol  2.5 mg Oral Q4H PRN Max 4/day HOLLI Lion      haloperidol  5 mg Oral Q4H PRN Max 4/day HOLLI Lion      hydroCHLOROthiazide  12.5 mg Oral Daily HOLLI Galvan      hydrocortisone   Topical 4x Daily PRN HOLLI Lion      hydrOXYzine HCL  100 mg Oral Q6H PRN Max 4/day Jordan C Holter, DO      Or    LORazepam  2 mg Intramuscular Q6H PRN Jordan C Holter, DO      hydrOXYzine HCL  100 mg Oral Q6H PRN Max 4/day HOLLI Lion      Or    LORazepam  2 mg Intramuscular Q6H PRN HOLLI Lion      hydrOXYzine HCL  25 mg Oral Q6H PRN Max 4/day Jordan C Holter, DO      ibuprofen  600 mg Oral Q8H PRN HOLLI Lion      influenza vaccine  0.5 mL Intramuscular Once Bora Rosario MD      loratadine  10 mg Oral Daily Brina Guillen MD      melatonin  3 mg Oral HS PRN Boar Rosario MD      melatonin  9 mg Oral HS Bora Rosario MD      methocarbamol  500 mg Oral Q6H PRN HOLLI Lion      multivitamin-minerals  1 tablet Oral Daily HOLLI Monge      nicotine polacrilex  2 mg Oral  Q4H PRN Bora Rosario MD      OLANZapine  5 mg Oral Q4H PRN Max 3/day [facility] Holter, DO      Or    OLANZapine  2.5 mg Intramuscular Q4H PRN Max 3/day [facility] Holter, DO      OLANZapine  5 mg Oral Q3H PRN Max 3/day [facility] Holter, DO      Or    OLANZapine  5 mg Intramuscular Q3H PRN Max 3/day [facility] Holter, DO      OLANZapine  2.5 mg Oral Q4H PRN Max 6/day [facility] Holter, DO      ondansetron  4 mg Oral Q6H PRN HOLLI Lion      pantoprazole  20 mg Oral QAM [facility] Holter, DO      polyethylene glycol  17 g Oral Daily PRN HOLLI Ghotra      propranolol  10 mg Oral Q12H KAYLIE HOLLI Lion      senna-docusate sodium  1 tablet Oral Daily PRN [facility] Cresencioter, DO      senna-docusate sodium  2 tablet Oral HS Melvina Ambron, DO      traZODone  50 mg Oral HS PRN HOLLI Lion      white petrolatum-mineral oil   Topical TID PRN HOLLI Lion         Risks / Benefits of Treatment:    Risks, benefits, and possible side effects of medications explained to patient and patient verbalizes understanding and agreement for treatment.    Patient was seen today for continuation of care, records reviewed and patient was discussed with the charge nurse.  No behavioral outbursts or acute events noted overnight and No significant changes in behaviors or clinical status over the last 24 hours.      Alberto was seen today while on the unit. Pt is known to provider from previous shifts on unit. Pt reports depression and mild anxiety today. Pt endorses continue AH with commands to harm self and family. Pt contracts for safety on unit. He reports voices are easier to ignore. He reports that he slept well and never eats breakfast but usually eats lunch and dinner. Nursing reports his appetite has been decreased and he has been eating on average 1 meal per day. He denies SI/HI/VH.       Alberto does appear overtly anxious or depressed.    He denies any suicidal ideation, intent or plan at present; denies any  homicidal ideation, intent or plan at present.  He endorses auditory hallucinations with commands to harm self and with commands to harm others, denies any visual hallucinations, denies any delusions.  He denies any side effects from current psychiatric medications.  No medication changes indicated at this time, continue current medication regimen.    Psychiatric Review of Systems:  Behavior over the last 24 hours: unchanged.   Sleep: sleeping okay throughout the night  Appetite: reduced  Medication side effects: none reported  ROS:no complaints    Mental Status Evaluation:    Appearance:  disheveled, looks stated age, wearing pajamas   Behavior:  cooperative, calm, interacts appropriately with this writer   Speech:  normal rate and volume   Mood:  depressed, anxious   Affect:  blunted   Thought Process:  goal directed, linear   Associations: intact associations   Thought Content:  no overt delusions, no paranoia noted on exam   Perceptual Disturbances: auditory hallucinations with commands to harm self and with commands to harm others, denies visual hallucinations when asked, appears distracted, appears preoccupied   Risk Potential: Suicidal ideation - None at present  Homicidal ideation - None at present  Potential for aggression - No   Sensorium:  oriented to person, place, and time/date   Memory:  recent and remote memory grossly intact   Consciousness:  alert and awake   Attention/Concentration: attention span and concentration appear shorter than expected for age   Insight:  fair   Judgment: fair   Gait/Station: normal gait/station   Motor Activity: no abnormal movements     Vital signs in last 24 hours:    Temp:  [97.2 °F (36.2 °C)-97.5 °F (36.4 °C)] 97.2 °F (36.2 °C)  HR:  [] 78  BP: (128-135)/(65-77) 128/65  Resp:  [18-20] 20  SpO2:  [98 %] 98 %  O2 Device: None (Room air)    Laboratory results: I have personally reviewed all pertinent laboratory/tests results    Discharge Disposition: Discharge  disposition and planning remain ongoing    Counseling / Coordination of Care:    Medication changes reviewed with nursing staff.  Patient's progress reviewed with nursing staff.  Reassurance and supportive therapy provided.  Group attendance encouraged.  Encouraged participation in milieu and group therapy on the unit.    Axel Bowens 10/26/24

## 2024-10-26 NOTE — ASSESSMENT & PLAN NOTE
Reviewed on 10/26/24    Pt reports depression and mild anxiety today, continues to endorse AH with commands to hurt self and others but states they are easier to ignore. Contracts for safety on unit. Denies paranoia today. Denies SI/HI/VH. Nursing reports reduced appetite with average 1 meal per day.    Continue Clozapine 225 mg qhs for psychosis -- to consider further careful titration for Sxs depending on response  Continue to obtain CBC with diff, CRP, and CK (weekly on Tues), and a Clozapine level and BNP (every 2 weeks on Tues)    Continue Abilify 30 mg qd for psychosis  Continue Escitalopram 20 mg qd for depression and anxiety  Continue Senna-Docusate sodium two 50 mg tablets daily before bed for constipation  Continue propanolol 10 mg bid for anxiety   Continue amlodipine 5 mg PO daily     Pt remains on dual antipsychotic Tx due to failure on monotherapy      ACT team referral was made  No associated orders from this encounter found during lookback period of 72 hours.

## 2024-10-26 NOTE — PLAN OF CARE
Problem: Alteration in Thoughts and Perception  Goal: Verbalize thoughts and feelings  Description: Interventions:  - Promote a nonjudgmental and trusting relationship with the patient through active listening and therapeutic communication  - Assess patient's level of functioning, behavior and potential for risk  - Engage patient in 1 on 1 interactions  - Encourage patient to express fears, feelings, frustrations, and discuss symptoms    - [place] patient to reality, help patient recognize reality-based thinking   - Administer medications as ordered and assess for potential side effects  - Provide the patient education related to the signs and symptoms of the illness and desired effects of prescribed medications  Outcome: Progressing  Goal: Agree to be compliant with medication regime, as prescribed and report medication side effects  Description: Interventions:  - Offer appropriate PRN medication and supervise ingestion; conduct AIMS, as needed   Outcome: Progressing  Goal: Attend and participate in unit activities, including therapeutic, recreational, and educational groups  Description: Interventions:  -Encourage Visitation and family involvement in care  Outcome: Progressing  Goal: Complete daily ADLs, including personal hygiene independently, as able  Description: Interventions:  - Observe, teach, and assist patient with ADLS  - Monitor and promote a balance of rest/activity, with adequate nutrition and elimination   Outcome: Progressing     Problem: Depression  Goal: Refrain from harming self  Description: Interventions:  - Monitor patient closely, per order   - Supervise medication ingestion, monitor effects and side effects   Outcome: Progressing  Goal: Refrain from isolation  Description: Interventions:  - Develop a trusting relationship   - Encourage socialization   Outcome: Progressing

## 2024-10-27 PROCEDURE — 99232 SBSQ HOSP IP/OBS MODERATE 35: CPT | Performed by: PHYSICIAN ASSISTANT

## 2024-10-27 RX ADMIN — GLYCOPYRROLATE 2 MG: 1 TABLET ORAL at 21:17

## 2024-10-27 RX ADMIN — HYDROCHLOROTHIAZIDE 12.5 MG: 12.5 TABLET ORAL at 08:58

## 2024-10-27 RX ADMIN — MELATONIN TAB 3 MG 9 MG: 3 TAB at 21:16

## 2024-10-27 RX ADMIN — GLYCOPYRROLATE 1 MG: 1 TABLET ORAL at 08:57

## 2024-10-27 RX ADMIN — GLYCOPYRROLATE 1 MG: 1 TABLET ORAL at 13:00

## 2024-10-27 RX ADMIN — CLOZAPINE 225 MG: 25 TABLET ORAL at 21:18

## 2024-10-27 RX ADMIN — PANTOPRAZOLE SODIUM 20 MG: 20 TABLET, DELAYED RELEASE ORAL at 08:56

## 2024-10-27 RX ADMIN — ATROPINE SULFATE 1 DROP: 10 SOLUTION/ DROPS OPHTHALMIC at 21:18

## 2024-10-27 RX ADMIN — NICOTINE POLACRILEX 2 MG: 2 GUM, CHEWING ORAL at 17:09

## 2024-10-27 RX ADMIN — NICOTINE POLACRILEX 2 MG: 2 GUM, CHEWING ORAL at 09:01

## 2024-10-27 RX ADMIN — MULTIPLE VITAMINS W/ MINERALS TAB 1 TABLET: TAB ORAL at 08:59

## 2024-10-27 RX ADMIN — LORATADINE 10 MG: 10 TABLET ORAL at 08:56

## 2024-10-27 RX ADMIN — ARIPIPRAZOLE 30 MG: 15 TABLET ORAL at 08:59

## 2024-10-27 RX ADMIN — NICOTINE POLACRILEX 2 MG: 2 GUM, CHEWING ORAL at 12:57

## 2024-10-27 RX ADMIN — ESCITALOPRAM OXALATE 20 MG: 10 TABLET, FILM COATED ORAL at 08:58

## 2024-10-27 RX ADMIN — PROPRANOLOL HYDROCHLORIDE 10 MG: 10 TABLET ORAL at 21:16

## 2024-10-27 RX ADMIN — NICOTINE POLACRILEX 2 MG: 2 GUM, CHEWING ORAL at 21:16

## 2024-10-27 RX ADMIN — SENNOSIDES AND DOCUSATE SODIUM 2 TABLET: 8.6; 5 TABLET ORAL at 21:17

## 2024-10-27 NOTE — PLAN OF CARE
Problem: Alteration in Thoughts and Perception  Goal: Verbalize thoughts and feelings  Description: Interventions:  - Promote a nonjudgmental and trusting relationship with the patient through active listening and therapeutic communication  - Assess patient's level of functioning, behavior and potential for risk  - Engage patient in 1 on 1 interactions  - Encourage patient to express fears, feelings, frustrations, and discuss symptoms    - [place] patient to reality, help patient recognize reality-based thinking   - Administer medications as ordered and assess for potential side effects  - Provide the patient education related to the signs and symptoms of the illness and desired effects of prescribed medications  Outcome: Progressing  Goal: Agree to be compliant with medication regime, as prescribed and report medication side effects  Description: Interventions:  - Offer appropriate PRN medication and supervise ingestion; conduct AIMS, as needed   Outcome: Progressing  Goal: Attend and participate in unit activities, including therapeutic, recreational, and educational groups  Description: Interventions:  -Encourage Visitation and family involvement in care  Outcome: Progressing  Goal: Complete daily ADLs, including personal hygiene independently, as able  Description: Interventions:  - Observe, teach, and assist patient with ADLS  - Monitor and promote a balance of rest/activity, with adequate nutrition and elimination   Outcome: Progressing     Problem: Ineffective Coping  Goal: Identifies ineffective coping skills  Outcome: Progressing  Goal: Patient/Family participate in treatment and DC plans  Description: Interventions:  - Provide therapeutic environment  Outcome: Progressing  Goal: Patient/Family verbalizes awareness of resources  Outcome: Progressing     Problem: Depression  Goal: Verbalize thoughts and feelings  Description: Interventions:  - Assess and re-assess patient's level of risk   - Engage patient  in 1:1 interactions, daily, for a minimum of 15 minutes   - Encourage patient to express feelings, fears, frustrations, hopes   Outcome: Progressing  Goal: Refrain from harming self  Description: Interventions:  - Monitor patient closely, per order   - Supervise medication ingestion, monitor effects and side effects   Outcome: Progressing  Goal: Refrain from isolation  Description: Interventions:  - Develop a trusting relationship   - Encourage socialization   Outcome: Progressing  Goal: Refrain from self-neglect  Outcome: Progressing  Goal: Attend and participate in unit activities, including therapeutic, recreational, and educational groups  Description: Interventions:  - Provide therapeutic and educational activities daily, encourage attendance and participation, and document same in the medical record   Outcome: Progressing  Goal: Complete daily ADLs, including personal hygiene independently, as able  Description: Interventions:  - Observe, teach, and assist patient with ADLS  -  Monitor and promote a balance of rest/activity, with adequate nutrition and elimination   Outcome: Progressing     Problem: Anxiety  Goal: Anxiety is at manageable level  Description: Interventions:  - Assess and monitor patient's anxiety level.   - Monitor for signs and symptoms (heart palpitations, chest pain, shortness of breath, headaches, nausea, feeling jumpy, restlessness, irritable, apprehensive).   - Collaborate with interdisciplinary team and initiate plan and interventions as ordered.  - [place] patient to unit/surroundings  - Explain treatment plan  - Encourage participation in care  - Encourage verbalization of concerns/fears  - Identify coping mechanisms  - Assist in developing anxiety-reducing skills  - Administer/offer alternative therapies  - Limit or eliminate stimulants  Outcome: Progressing     Problem: Alteration in Orientation  Goal: Interact with staff daily  Description: Interventions:  - Assess and re-assess  patient's level of orientation  - Engage patient in 1 on 1 interactions, daily, for a minimum of 15 minutes   - Establish rapport/trust with patient   Outcome: Progressing     Problem: Alteration in Thoughts and Perception  Goal: Treatment Goal: Gain control of psychotic behaviors/thinking, reduce/eliminate presenting symptoms and demonstrate improved reality functioning upon discharge  Outcome: Not Progressing  Goal: Refrain from acting on delusional thinking/internal stimuli  Description: Interventions:  - Monitor patient closely, per order   - Utilize least restrictive measures   - Set reasonable limits, give positive feedback for acceptable   - Administer medications as ordered and monitor of potential side effects  Outcome: Not Progressing  Goal: Recognize dysfunctional thoughts, communicate reality-based thoughts at the time of discharge  Description: Interventions:  - Provide medication and psycho-education to assist patient in compliance and developing insight into his/her illness   Outcome: Not Progressing

## 2024-10-27 NOTE — ASSESSMENT & PLAN NOTE
Reviewed on 10/27/24  Follows with medical  Continue senna-docusate sodium two tablets before bed per medical team  No associated orders from this encounter found during lookback period of 72 hours.

## 2024-10-27 NOTE — ASSESSMENT & PLAN NOTE
Reviewed on 10/27/24  Ongoing by medical  No associated orders from this encounter found during lookback period of 72 hours.

## 2024-10-27 NOTE — ASSESSMENT & PLAN NOTE
Reviewed on 10/27/24  Follows with medical  No associated orders from this encounter found during lookback period of 72 hours.

## 2024-10-27 NOTE — PROGRESS NOTES
This note was not shared with the patient due to reasonable likelihood of causing patient harm    Progress Note - Behavioral Health   Name: Alberto Bright y.o. male I MRN: [id number]  Unit/Bed#: [facility] 112-02 I Date of Admission: 3/29/2024   Date of Service: 10/27/2024 I Hospital Day: 212    Alberto Bright y.o. male MRN: [id number]   Unit/Bed#: [facility] 112-02 Encounter: [id number]        Assessment & Plan  Schizoaffective disorder, bipolar type (HCC)  Reviewed on 10/27/24    Pt reports continued depression and anxiety and worsening AH with voices threatening to hurt him and family which are now harder to ignore. He denies SI/HI/VH. Nursing reports he had been more social and brighter as of this AM but pt more withdrawn on interview.     Continue Clozapine 225 mg qhs for psychosis -- to consider further careful titration for Sxs depending on response  Continue to obtain CBC with diff, CRP, and CK (weekly on Tues), and a Clozapine level and BNP (every 2 weeks on Tues)    Continue Abilify 30 mg qd for psychosis  Continue Escitalopram 20 mg qd for depression and anxiety  Continue Senna-Docusate sodium two 50 mg tablets daily before bed for constipation  Continue propanolol 10 mg bid for anxiety   Continue amlodipine 5 mg PO daily     Pt remains on dual antipsychotic Tx due to failure on monotherapy      ACT team referral was made  No associated orders from this encounter found during lookback period of 72 hours.    Chronic idiopathic constipation  Reviewed on 10/27/24  Follows with medical  Continue senna-docusate sodium two tablets before bed per medical team  No associated orders from this encounter found during lookback period of 72 hours.  GERD (gastroesophageal reflux disease)  Reviewed on 10/27/24  Follows with medical  Continue famotidine 20 mg tablet BID per medical team   Continue glycopyrrolate 1 mg tablet BID AM and afternoon per medical team  Continue glycopyrrolate 2 mg tablet before bed per medical  team  Continue pantoprazole EC 20 mg tablet every morning per medical team  No associated orders from this encounter found during lookback period of 72 hours.  Medical clearance for psychiatric admission  Reviewed on 10/27/24  Ongoing by medical  No associated orders from this encounter found during lookback period of 72 hours.    Tobacco abuse  Reviewed on 10/27/24  Continue to encourage cessation upon discharge  No associated orders from this encounter found during lookback period of 72 hours.  Elevated hemoglobin A1c  Reviewed on 10/27/24  Follows with medical  No associated orders from this encounter found during lookback period of 72 hours.     Current medications:  Current Facility-Administered Medications   Medication Dose Route Frequency Provider Last Rate    acetaminophen  650 mg Oral Q4H PRN Jordan C Holter, DO      acetaminophen  650 mg Oral Q6H PRN HOLLI Lion      aluminum-magnesium hydroxide-simethicone  30 mL Oral Q4H PRN Jordan C Holter, DO      amLODIPine  5 mg Oral Daily HOLLI Lion      ARIPiprazole  30 mg Oral Daily Bora Rosario MD      Artificial Tears  1 drop Both Eyes Q3H PRN Jordan C Holter, DO      atropine  1 drop Sublingual HS Harjeet Torres, DO      haloperidol lactate  2.5 mg Intramuscular Q4H PRN Max 4/day STEVE LionNP      And    LORazepam  1 mg Intramuscular Q4H PRN Max 4/day HOLLI Lion      And    benztropine  0.5 mg Intramuscular Q4H PRN Max 4/day HOLLI Lion      benztropine  1 mg Intramuscular Q4H PRN Max 6/day Jordan C Holter, DO      haloperidol lactate  5 mg Intramuscular Q4H PRN Max 4/day HOLLI Lion      And    LORazepam  2 mg Intramuscular Q4H PRN Max 4/day HOLLI Lion      And    benztropine  1 mg Intramuscular Q4H PRN Max 4/day HOLLI Lion      benztropine  1 mg Oral Q4H PRN Max 6/day HOLLI Lion      benztropine  1 mg Oral Q4H PRN Max 6/day Jordan C Holter, DO      bisacodyl  10 mg Rectal Daily PRN Eveline  HOLLI Hunt      calcium carbonate  500 mg Oral BID PRN HOLLI Monge      cloZAPine  225 mg Oral HS Hardik So MD      hydrOXYzine HCL  50 mg Oral Q6H PRN Max 4/day Jordan C Holter, DO      Or    diphenhydrAMINE  50 mg Intramuscular Q6H PRN Jordan C Holter, DO      hydrOXYzine HCL  50 mg Oral Q6H PRN Max 4/day HOLLI Lion      Or    diphenhydrAMINE  50 mg Intramuscular Q6H PRN HOLLI Lion      diphenhydrAMINE-zinc acetate   Topical BID PRN HOLLI Lion      escitalopram  20 mg Oral Daily HOLLI Lion      famotidine  20 mg Oral BID PRN HOLLI Monge      fluticasone  1 spray Each Nare Daily PRN HOLLI Monge      glycopyrrolate  1 mg Oral BID (AM & Afternoon) Bora Rosario MD      glycopyrrolate  2 mg Oral HS Bora Rosario MD      haloperidol  1 mg Oral Q6H PRN HOLLI Lion      haloperidol  2.5 mg Oral Q4H PRN Max 4/day HOLLI Lion      haloperidol  5 mg Oral Q4H PRN Max 4/day HOLLI Lion      hydroCHLOROthiazide  12.5 mg Oral Daily HOLLI Galvan      hydrocortisone   Topical 4x Daily PRN HOLLI Lion      hydrOXYzine HCL  100 mg Oral Q6H PRN Max 4/day Jordan C Holter, DO      Or    LORazepam  2 mg Intramuscular Q6H PRN Jordan C Holter, DO      hydrOXYzine HCL  100 mg Oral Q6H PRN Max 4/day HOLLI Lion      Or    LORazepam  2 mg Intramuscular Q6H PRN HOLLI Lion      hydrOXYzine HCL  25 mg Oral Q6H PRN Max 4/day Ion Dupontter, DO      ibuprofen  600 mg Oral Q8H PRN HOLLI Lion      influenza vaccine  0.5 mL Intramuscular Once Bora Rosario MD      loratadine  10 mg Oral Daily Brina Guillen MD      melatonin  3 mg Oral HS PRN Bora Rosario MD      melatonin  9 mg Oral HS Bora Rosario MD      methocarbamol  500 mg Oral Q6H PRN Eveline Hangey, CRNP      multivitamin-minerals  1 tablet Oral Daily HOLLI Monge      nicotine polacrilex  2 mg Oral Q4H PRN  "Bora Rosario MD      OLANZapine  5 mg Oral Q4H PRN Max 3/day [facility] Holter, DO      Or    OLANZapine  2.5 mg Intramuscular Q4H PRN Max 3/day [facility] Holter, DO      OLANZapine  5 mg Oral Q3H PRN Max 3/day [facility] Holter, DO      Or    OLANZapine  5 mg Intramuscular Q3H PRN Max 3/day [facility] Holter, DO      OLANZapine  2.5 mg Oral Q4H PRN Max 6/day [facility] Holter, DO      ondansetron  4 mg Oral Q6H PRN HOLLI Lion      pantoprazole  20 mg Oral QAM [facility] Holter, DO      polyethylene glycol  17 g Oral Daily PRN HOLLI Ghotra      propranolol  10 mg Oral Q12H KAYLIE HOLLI Lion      senna-docusate sodium  1 tablet Oral Daily PRN [facility] Holter, DO      senna-docusate sodium  2 tablet Oral HS Melvina Ambron, DO      traZODone  50 mg Oral HS PRN HOLLI Lion      white petrolatum-mineral oil   Topical TID PRN HOLLI Lion         Risks / Benefits of Treatment:    Risks, benefits, and possible side effects of medications explained to patient and patient verbalizes understanding and agreement for treatment.    Patient was seen today for continuation of care, records reviewed and patient was discussed with the charge nurse.  No behavioral outbursts or acute events noted overnight and No significant changes in behaviors or clinical status over the last 24 hours.      Alberto was seen today while on the unit. Pt more withdrawn and less verbal today. Pt reports continued depression and anxiety as well as AH of voices threatening him and family. Pt reports these are getting hard to ignore again. Pt denies VH/SI/HI. He reports he slept well but still endorses reduced appetite. He reports being \"tired\". Nursing reports pt has been brighter and more social over last several days and that he is reporting AH but was saying they were more manageable.     Alberto does appear overtly anxious or depressed.    He denies any suicidal ideation, intent or plan at present; denies any homicidal ideation, " "intent or plan at present.  He endorses auditory hallucinations with commands to harm self and with commands to harm others, denies any visual hallucinations, denies any delusions.  He denies any side effects from current psychiatric medications.  No medication changes indicated at this time, continue current medication regimen.    Psychiatric Review of Systems:  Behavior over the last 24 hours: unchanged.   Sleep: sleeping okay throughout the night  Appetite: reduced  Medication side effects: none reported  ROS:no complaints    Mental Status Evaluation:    Appearance:  disheveled, looks stated age, wearing pajamas   Behavior:  cooperative, calm, interacts appropriately with this writer   Speech:  decreased rate, soft   Mood:  depressed, anxious   Affect:  blunted   Thought Process:  goal directed, linear   Associations: intact associations   Thought Content:  no overt delusions, no paranoia noted on exam   Perceptual Disturbances: auditory hallucinations of \"voices\" and voices threatening him and family, denies visual hallucinations when asked, appears distracted, appears preoccupied   Risk Potential: Suicidal ideation - None at present  Homicidal ideation - None at present  Potential for aggression - Not at present   Sensorium:  oriented to person, place, and time/date   Memory:  recent and remote memory grossly intact   Consciousness:  alert and awake   Attention/Concentration: attention span and concentration appear shorter than expected for age   Insight:  fair   Judgment: fair   Gait/Station: normal gait/station   Motor Activity: no abnormal movements     Vital signs in last 24 hours:    Temp:  [97.8 °F (36.6 °C)-98 °F (36.7 °C)] 97.8 °F (36.6 °C)  HR:  [] 94  BP: (114-117)/(77-82) 114/77  Resp:  [18] 18  SpO2:  [96 %-99 %] 99 %  O2 Device: None (Room air)    Laboratory results: I have personally reviewed all pertinent laboratory/tests results    Discharge Disposition: Discharge disposition and planning " remain ongoing    Counseling / Coordination of Care:    Medication changes reviewed with nursing staff.  Patient's progress reviewed with nursing staff.  Reassurance and supportive therapy provided.  Group attendance encouraged.  Encouraged participation in milieu and group therapy on the unit.    Axel Bowens 10/27/24

## 2024-10-27 NOTE — ASSESSMENT & PLAN NOTE
Reviewed on 10/27/24  Continue to encourage cessation upon discharge  No associated orders from this encounter found during lookback period of 72 hours.

## 2024-10-27 NOTE — ASSESSMENT & PLAN NOTE
Reviewed on 10/27/24    Pt reports continued depression and anxiety and worsening AH with voices threatening to hurt him and family which are now harder to ignore. He denies SI/HI/VH. Nursing reports he had been more social and brighter as of this AM but pt more withdrawn on interview.     Continue Clozapine 225 mg qhs for psychosis -- to consider further careful titration for Sxs depending on response  Continue to obtain CBC with diff, CRP, and CK (weekly on Tues), and a Clozapine level and BNP (every 2 weeks on Tues)    Continue Abilify 30 mg qd for psychosis  Continue Escitalopram 20 mg qd for depression and anxiety  Continue Senna-Docusate sodium two 50 mg tablets daily before bed for constipation  Continue propanolol 10 mg bid for anxiety   Continue amlodipine 5 mg PO daily     Pt remains on dual antipsychotic Tx due to failure on monotherapy      ACT team referral was made  No associated orders from this encounter found during lookback period of 72 hours.

## 2024-10-27 NOTE — NURSING NOTE
Alberto reported feeling depressed  and anxious today.  Reported that he hears voices everyday.  They are still telling him that they are going to kill him and his family.   Pt is visible, social, pleasant and cooperative.  Meal and med compliant.  25%  for dinner.  Requested gum throughout the shift.  No unmet needs.  No behavioral issues noted or reported.

## 2024-10-27 NOTE — ASSESSMENT & PLAN NOTE
Reviewed on 10/27/24  Follows with medical  Continue famotidine 20 mg tablet BID per medical team   Continue glycopyrrolate 1 mg tablet BID AM and afternoon per medical team  Continue glycopyrrolate 2 mg tablet before bed per medical team  Continue pantoprazole EC 20 mg tablet every morning per medical team  No associated orders from this encounter found during lookback period of 72 hours.

## 2024-10-28 PROCEDURE — 99232 SBSQ HOSP IP/OBS MODERATE 35: CPT | Performed by: PSYCHIATRY & NEUROLOGY

## 2024-10-28 RX ADMIN — MELATONIN TAB 3 MG 9 MG: 3 TAB at 21:23

## 2024-10-28 RX ADMIN — GLYCOPYRROLATE 2 MG: 1 TABLET ORAL at 21:23

## 2024-10-28 RX ADMIN — NICOTINE POLACRILEX 2 MG: 2 GUM, CHEWING ORAL at 08:56

## 2024-10-28 RX ADMIN — GLYCOPYRROLATE 1 MG: 1 TABLET ORAL at 08:56

## 2024-10-28 RX ADMIN — GLYCOPYRROLATE 1 MG: 1 TABLET ORAL at 13:48

## 2024-10-28 RX ADMIN — NICOTINE POLACRILEX 2 MG: 2 GUM, CHEWING ORAL at 13:48

## 2024-10-28 RX ADMIN — PROPRANOLOL HYDROCHLORIDE 10 MG: 10 TABLET ORAL at 21:23

## 2024-10-28 RX ADMIN — MULTIPLE VITAMINS W/ MINERALS TAB 1 TABLET: TAB ORAL at 08:56

## 2024-10-28 RX ADMIN — ARIPIPRAZOLE 30 MG: 15 TABLET ORAL at 08:56

## 2024-10-28 RX ADMIN — CALCIUM CARBONATE (ANTACID) CHEW TAB 500 MG 500 MG: 500 CHEW TAB at 14:25

## 2024-10-28 RX ADMIN — ATROPINE SULFATE 1 DROP: 10 SOLUTION/ DROPS OPHTHALMIC at 21:22

## 2024-10-28 RX ADMIN — PANTOPRAZOLE SODIUM 20 MG: 20 TABLET, DELAYED RELEASE ORAL at 08:56

## 2024-10-28 RX ADMIN — HYDROCHLOROTHIAZIDE 12.5 MG: 12.5 TABLET ORAL at 08:56

## 2024-10-28 RX ADMIN — NICOTINE POLACRILEX 2 MG: 2 GUM, CHEWING ORAL at 17:48

## 2024-10-28 RX ADMIN — ESCITALOPRAM OXALATE 20 MG: 10 TABLET, FILM COATED ORAL at 08:56

## 2024-10-28 RX ADMIN — NICOTINE POLACRILEX 2 MG: 2 GUM, CHEWING ORAL at 21:23

## 2024-10-28 RX ADMIN — AMLODIPINE BESYLATE 5 MG: 5 TABLET ORAL at 08:57

## 2024-10-28 RX ADMIN — CLOZAPINE 225 MG: 25 TABLET ORAL at 21:23

## 2024-10-28 RX ADMIN — SENNOSIDES AND DOCUSATE SODIUM 2 TABLET: 8.6; 5 TABLET ORAL at 21:23

## 2024-10-28 RX ADMIN — PROPRANOLOL HYDROCHLORIDE 10 MG: 10 TABLET ORAL at 08:56

## 2024-10-28 RX ADMIN — LORATADINE 10 MG: 10 TABLET ORAL at 08:56

## 2024-10-28 NOTE — NURSING NOTE
SUBJECTIVE:   CC: Eve Byrd is an 49 year old woman who presents for preventive health visit.     Answers for HPI/ROS submitted by the patient on 12/6/2017   Annual Exam:  Getting at least 3 servings of Calcium per day:: NO  Bi-annual eye exam:: Yes  Dental care twice a year:: Yes  Sleep apnea or symptoms of sleep apnea:: None  Diet:: Regular (no restrictions)  Frequency of exercise:: None  Taking medications regularly:: Yes  Medication side effects:: None  Additional concerns today:: No  PHQ-2 Score: 0      Today's PHQ-2 Score:   PHQ-2 ( 1999 [employer]) 8/2/2017 2/27/2017   Q1: Little interest or pleasure in doing things 0 0   Q2: Feeling down, depressed or hopeless 0 0   PHQ-2 Score 0 0         Abuse: Current or Past(Physical, Sexual or Emotional)- No  Do you feel safe in your environment - Yes  Social History   Substance Use Topics     Smoking status: Former Smoker     Years: 30.00     Types: Cigarettes     Smokeless tobacco: Former User     Quit date: 3/31/2017     Alcohol use 0.0 oz/week     0 Standard drinks or equivalent per week      Comment: Socially      The patient does not drink >3 drinks per day nor >7 drinks per week.    Reviewed orders with patient.  Reviewed health maintenance and updated orders accordingly - Yes    Patient over age 50, mutual decision to screen reflected in health maintenance.      Pertinent mammograms are reviewed under the imaging tab.  History of abnormal Pap smear: NO - age 30-65 PAP every 5 years with negative HPV co-testing recommended    Reviewed and updated as needed this visit by clinical staffTobacco  Allergies  Med Hx  Surg Hx  Fam Hx  Soc Hx        Reviewed and updated as needed this visit by Provider       ROS:  C: NEGATIVE for fever, chills, change in weight  I: NEGATIVE for worrisome rashes, moles or lesions  ENT: NEGATIVE for ear, mouth and throat problems  R: NEGATIVE for significant cough or SOB  CV: NEGATIVE for chest pain, palpitations or peripheral  Alberto reported feeling depressed today, but denied feeling anxious.  Reported that he hears voices everyday.    Pt is visible, social, pleasant and cooperative.  Meal and med compliant.  Pt refused breakfast, but consumed 100% of lunch and dinner.  Requested gum throughout the shift.  No unmet needs.  No behavioral issues noted or reported.     "edema  GI: NEGATIVE for nausea, abdominal pain, heartburn, or change in bowel habits  : NEGATIVE for unusual urinary or vaginal symptoms. Periods are irregular.  M: NEGATIVE for significant arthralgias or myalgia  N: NEGATIVE for weakness, dizziness or paresthesias  P: NEGATIVE for changes in mood or affect    OBJECTIVE:   /70  Pulse 82  Temp 98.2  F (36.8  C) (Oral)  Ht 5' 3.5\" (1.613 m)  Wt 204 lb (92.5 kg)  SpO2 92%  BMI 35.57 kg/m2  EXAM:  GENERAL: healthy, alert and no distress  EYES: Eyes grossly normal to inspection, PERRL and conjunctivae and sclerae normal  HENT: ear canals and TM's normal, nose and mouth without ulcers or lesions  NECK: no adenopathy, no asymmetry, masses, or scars and thyroid normal to palpation  RESP: lungs clear to auscultation - no rales, rhonchi or wheezes  BREAST: DECLINED PER PATIENT  CV: regular rate and rhythm, normal S1 S2, no S3 or S4, no murmur, click or rub, no peripheral edema and peripheral pulses strong  ABDOMEN: soft, nontender, no hepatosplenomegaly, no masses and bowel sounds normal  MS: no gross musculoskeletal defects noted  NEURO: Normal strength and tone, mentation intact and speech normal  PSYCH: mentation appears normal, affect normal/bright    ASSESSMENT/PLAN:   (Z00.00) Encounter for routine adult health examination without abnormal findings  (primary encounter diagnosis)  Comment: stable and doing well  Plan: Hemoglobin, Comprehensive metabolic panel        Discussed weight loss    (E03.9) Acquired hypothyroidism  Comment:   TSH   Date Value Ref Range Status   08/02/2017 1.90 0.40 - 4.00 mU/L Final   ]  Plan: stable on therapy sordered    (F31.70) Bipolar affective disorder in remission (H)  Comment: stable and followed per Mental health provider  Plan:     (J45.20) Mild intermittent asthma, unspecified whether complicated  Comment: stable on ACT, stopped Dulera on own, rarely uses albuterol, holding steroid inhaler at present  Plan:     (Z13.6) " "CARDIOVASCULAR SCREENING; LDL GOAL LESS THAN 160  Comment: labs as fasting  Plan: Lipid Profile            (Z72.0) Tobacco abuse  Comment:   Plan: Continued smoking cessation was advised and the risks of continued smoking in regards to this patients health history was reiterated. Options of smoking cessation were also discussed. This time extended beyond the routine exam.        COUNSELING:   Reviewed preventive health counseling, as reflected in patient instructions       Regular exercise       Healthy diet/nutrition         reports that she has quit smoking. Her smoking use included Cigarettes. She quit after 30.00 years of use. She quit smokeless tobacco use about 8 months ago.    Estimated body mass index is 33.65 kg/(m^2) as calculated from the following:    Height as of 8/2/17: 5' 3.5\" (1.613 m).    Weight as of 8/2/17: 193 lb (87.5 kg).   Weight management plan: Discussed healthy diet and exercise guidelines and patient will follow up in 12 months in clinic to re-evaluate.    Counseling Resources:  ATP IV Guidelines  Pooled Cohorts Equation Calculator  Breast Cancer Risk Calculator  FRAX Risk Assessment  ICSI Preventive Guidelines  Dietary Guidelines for Americans, 2010  USDA's MyPlate  ASA Prophylaxis  Lung CA Screening    Prieto Vieira MD  [facility]  "

## 2024-10-28 NOTE — ASSESSMENT & PLAN NOTE
Reviewed on 10/28/24  Follows with medical  Continue senna-docusate sodium two tablets before bed per medical team  No associated orders from this encounter found during lookback period of 72 hours.

## 2024-10-28 NOTE — PROGRESS NOTES
Progress Note - Behavioral Health   Name: Alberto Berumen 29 y.o. male I MRN: [id number]  Unit/Bed#: [facility] 112-02 I Date of Admission: 3/29/2024   Date of Service: 10/28/2024 I Hospital Day: 213     Assessment & Plan  Schizoaffective disorder, bipolar type (HCC)  Reviewed on 10/28/24    Pt reports continued depression, anxiety, and feeling overwhelmed. He reports symptoms consistent with AH, noting voices that are threatening to hurt him, but denies symptoms consistent with VH. He denies SI and HI at this time. Nursing reports he has been more social and brighter throughout weekend.     Continue Clozapine 225 mg qhs for psychosis -- to consider further careful titration for Sxs depending on response  Continue to obtain CBC with diff, CRP, and CK (weekly on Tues), and a Clozapine level and BNP (every 2 weeks on Tues)    Continue Abilify 30 mg qd for psychosis  Continue Escitalopram 20 mg qd for depression and anxiety  Continue Senna-Docusate sodium two 50 mg tablets daily before bed for constipation  Continue propanolol 10 mg bid for anxiety   Continue amlodipine 5 mg PO daily     Pt remains on dual antipsychotic Tx due to failure on monotherapy      ACT team referral was made  No associated orders from this encounter found during lookback period of 72 hours.    Chronic idiopathic constipation  Reviewed on 10/28/24  Follows with medical  Continue senna-docusate sodium two tablets before bed per medical team  No associated orders from this encounter found during lookback period of 72 hours.  GERD (gastroesophageal reflux disease)  Reviewed on 10/28/24  Follows with medical  Continue famotidine 20 mg tablet BID per medical team   Continue glycopyrrolate 1 mg tablet BID AM and afternoon per medical team  Continue glycopyrrolate 2 mg tablet before bed per medical team  Continue pantoprazole EC 20 mg tablet every morning per medical team  No associated orders from this encounter found during lookback period of 72  "hours.  Medical clearance for psychiatric admission  Reviewed on 10/28/24  Ongoing by medical  No associated orders from this encounter found during lookback period of 72 hours.    Tobacco abuse  Reviewed on 10/28/24  Continue to encourage cessation upon discharge  No associated orders from this encounter found during lookback period of 72 hours.  Elevated hemoglobin A1c  Reviewed on 10/28/24  Follows with medical  No associated orders from this encounter found during lookback period of 72 hours.      [facility]  Psychiatric Progress Note - Department of Behavioral Health   Alberto Berumen 29 y.o. male MRN: [id number]  Unit/Bed#: [facility] 112-02 Encounter: [id number]    SUBJECTIVE     Patient evaluated this a.m. for continuity of care. Patient was discussed at length with nursing and treatment team. Per nursing, patient was brighter and more visible on the unit this past weekend. He has reported feelings of depression and anxiety, as well as persistent but manageable auditory hallucinations. He attended 6 out of 9 groups on Friday.    During evaluation, no acute distress is noted. Alberto Berumen was pleasant, calm, and cooperative this morning, and notes that he is \"okay but tired\". He reports unchanged symptoms of depression and anxiety. He continues to endorse AH that tell him that they are going to harm him. He denies symptoms consistent with VH, and denies SI and HI at this time. Patient does not appear to be responding to internal stimuli at the time of this interview. He notes that he slept well yesterday. He has a brother-in-law visit planned for Tuesday, which he is looking forward to. Patient notes that he skipped breakfast this morning due to lack of appetite, but plans to go to lunch. He also hopes to workout later today.     Patient participated in treatment team meeting this morning, which went well. Treatment team notes that he is currently on the waiting list for ACT services. " "    PSYCHIATRIC REVIEW OF SYSTEMS     Behavior over the last 24 hours:  unchanged  Sleep: normal  Appetite: normal  Medication side effects: No    Progress Toward Goals: Patient is working towards goals, as evidenced by his participation in 6 out of 9 groups on Friday, participation in his treatment team meeting, and his  adherence to his medication regimen.  Patient Acceptance: Patient is cooperative with his medication regimen and treatment plan, and acknowledges his symptoms.   Patient Understanding: Patient is understanding of the reason for his treatment.   Patient Involvement in Reintegration Process: Patient continues to be in positive contact with family members, and has a brother-in-law coming to visit on Tuesday.   Patient's Therapeutic Relationship with Psychiatrist: Trusting.   Patient's Optimism Regarding Recovery: Patient is continuing to be cooperative with treatment plan and medication regimen.   Group Attendance: Patient attended 6 out of 9 groups on Friday.     REVIEW OF SYSTEMS   Review of systems: no complaints    OBJECTIVE     Vital Signs in Past 24 Hours:  Temp:  [97.7 °F (36.5 °C)-98.2 °F (36.8 °C)] 97.7 °F (36.5 °C)  HR:  [88-96] 94  BP: (115-123)/(66-82) 122/66  Resp:  [18-19] 18  SpO2:  [96 %] 96 %  O2 Device: None (Room air)    Intake/Output in Past 24 hours:  No intake/output data recorded.  No intake/output data recorded.        Laboratory Results:  I have personally reviewed all pertinent laboratory/tests results.  Labs in last 72 hours: No results for input(s): \"WBC\", \"RBC\", \"HGB\", \"HCT\", \"PLT\", \"RDW\", \"TOTANEUTABS\", \"NEUTROABS\", \"SODIUM\", \"K\", \"CL\", \"CO2\", \"BUN\", \"CREATININE\", \"GLUC\", \"GLUF\", \"CALCIUM\", \"AST\", \"ALT\", \"ALKPHOS\", \"TP\", \"ALB\", \"TBILI\", \"CHOLESTEROL\", \"HDL\", \"TRIG\", \"LDLCALC\", \"VALPROICTOT\", \"CARBAMAZEPIN\", \"LITHIUM\", \"AMMONIA\", \"KWB5ZUEOCAAG\", \"FREET4\", \"T3FREE\", \"PREGTESTUR\", \"PREGSERUM\", \"HCG\", \"HCGQUANT\", \"RPR\" in the last 72 hours.    Behavioral Health Medications: " all current active meds have been reviewed.    Current Facility-Administered Medications   Medication Dose Route Frequency Provider Last Rate    acetaminophen  650 mg Oral Q4H PRN Jordan C Holter, DO      acetaminophen  650 mg Oral Q6H PRN HOLLI Lion      aluminum-magnesium hydroxide-simethicone  30 mL Oral Q4H PRN Jordan C Holter, DO      amLODIPine  5 mg Oral Daily HOLLI Lion      ARIPiprazole  30 mg Oral Daily Bora Rosario MD      Artificial Tears  1 drop Both Eyes Q3H PRN Jordan C Holter,       atropine  1 drop Sublingual HS Harjeet Torres,       haloperidol lactate  2.5 mg Intramuscular Q4H PRN Max 4/day HOLLI Lion      And    LORazepam  1 mg Intramuscular Q4H PRN Max 4/day HOLLI Lion      And    benztropine  0.5 mg Intramuscular Q4H PRN Max 4/day HOLLI Lion      benztropine  1 mg Intramuscular Q4H PRN Max 6/day Jordan C Holter, DO      haloperidol lactate  5 mg Intramuscular Q4H PRN Max 4/day HOLLI Lion      And    LORazepam  2 mg Intramuscular Q4H PRN Max 4/day HOLLI Lion      And    benztropine  1 mg Intramuscular Q4H PRN Max 4/day HOLLI Lion      benztropine  1 mg Oral Q4H PRN Max 6/day HOLLI Lion      benztropine  1 mg Oral Q4H PRN Max 6/day Jordan C Holter, DO      bisacodyl  10 mg Rectal Daily PRN HOLLI Lion      calcium carbonate  500 mg Oral BID PRN HOLLI Monge      cloZAPine  225 mg Oral HS Hardik So MD      hydrOXYzine HCL  50 mg Oral Q6H PRN Max 4/day Jordan C Holter, DO      Or    diphenhydrAMINE  50 mg Intramuscular Q6H PRN Jordan C Holter, DO      hydrOXYzine HCL  50 mg Oral Q6H PRN Max 4/day HOLLI Lion      Or    diphenhydrAMINE  50 mg Intramuscular Q6H PRN HOLLI Lion      diphenhydrAMINE-zinc acetate   Topical BID PRN HOLLI Lion      escitalopram  20 mg Oral Daily HOLLI Lion      famotidine  20 mg Oral BID PRN Eileen Jensen, STEVENP       fluticasone  1 spray Each Nare Daily PRN HOLLI Monge      glycopyrrolate  1 mg Oral BID (AM & Afternoon) Bora Rosario MD      glycopyrrolate  2 mg Oral HS Bora Rosario MD      haloperidol  1 mg Oral Q6H PRN HOLLI Lion      haloperidol  2.5 mg Oral Q4H PRN Max 4/day HOLLI Lion      haloperidol  5 mg Oral Q4H PRN Max 4/day HOLLI Lion      hydroCHLOROthiazide  12.5 mg Oral Daily Pia Mantilla, HOLLI      hydrocortisone   Topical 4x Daily PRN HOLLI Lion      hydrOXYzine HCL  100 mg Oral Q6H PRN Max 4/day [facility] Holter, DO      Or    LORazepam  2 mg Intramuscular Q6H PRN [facility] Holter, DO      hydrOXYzine HCL  100 mg Oral Q6H PRN Max 4/day HOLLI Lion      Or    LORazepam  2 mg Intramuscular Q6H PRN HOLLI Lion      hydrOXYzine HCL  25 mg Oral Q6H PRN Max 4/day [facility] Holter, DO      ibuprofen  600 mg Oral Q8H PRN HOLLI Lion      influenza vaccine  0.5 mL Intramuscular Once Bora Rosario MD      loratadine  10 mg Oral Daily Brina Guillen MD      melatonin  3 mg Oral HS PRN Bora Rosario MD      melatonin  9 mg Oral HS Bora Rosario MD      methocarbamol  500 mg Oral Q6H PRN HOLLI Lion      multivitamin-minerals  1 tablet Oral Daily HOLLI Monge      nicotine polacrilex  2 mg Oral Q4H PRN Bora Rosario MD      OLANZapine  5 mg Oral Q4H PRN Max 3/day [facility] Holter, DO      Or    OLANZapine  2.5 mg Intramuscular Q4H PRN Max 3/day [facility] Holter, DO      OLANZapine  5 mg Oral Q3H PRN Max 3/day [facility] Holter, DO      Or    OLANZapine  5 mg Intramuscular Q3H PRN Max 3/day [facility] Holter, DO      OLANZapine  2.5 mg Oral Q4H PRN Max 6/day [facility] Holter, DO      ondansetron  4 mg Oral Q6H PRN HOLLI Lion      pantoprazole  20 mg Oral [facility] Holter, DO      polyethylene glycol  17 g Oral Daily PRN HOLLI Ghotra      propranolol  10 mg Oral Q12H KAYLIE HOLLI Lion      senna-docusate sodium  1  "tablet Oral Daily PRN Jordan C Holter, DO      senna-docusate sodium  2 tablet Oral HS Melvina De Jesus DO      traZODone  50 mg Oral HS PRN HOLLI Lion      white petrolatum-mineral oil   Topical TID PRN HOLLI Lion         Risks, benefits and possible side effects of Medications:   Risks, benefits, and possible side effects of medications explained to patient and patient verbalizes understanding.      Dual Antipsychotic Rationale: Patient is on dual antipsychotic therapy due to failure on previous medications.      Mental Status Evaluation:  Appearance:  Patient is awake, wearing a black sweatshirt, gray hospital gown, and light green hospital pants. He appears in no acute distress, and is seated on the edge of his bed.    Behavior:  Patient is calm, cooperative, and pleasant this morning. He is seated on the corner of his bed at time of this interview. No stereotyped behaviors are noted during interview.    Speech:  Speech is of normal volume, logical, coherent, linear, and goal-directed.    Mood:  \"Depressed\"   Affect:  Depressed, mood-congruent, stable throughout interview, and appropriate to thought content.    Thought Process:  organized, logical, coherent, goal directed, linear, normal rate of thoughts   Associations: intact associations   Thought Content:  Patient notes that the voices he hears threaten to harm him, but he denies symptoms consistent with referential or grandiose delusions. He denies SI and HI at time of this interview.    Perceptual Disturbances: Patient endorses symptoms consistent with AH, but denies VH. He does not appear to be responding to internal stimuli at the time of this interview.    Risk Potential: Suicidal ideation - denies  Homicidal ideation - denies  Potential for aggression - Not at present   Sensorium:  oriented to person, place, and time/date   Memory:  recent and remote memory grossly intact   Consciousness:  alert and awake   Attention/Concentration: " attention span and concentration are age appropriate   Insight:  fair   Judgment: good   Gait/Station: normal gait/station   Motor Activity: no abnormal movements     Recommended Treatment:   See above for assessment and plan.    Counseling/Coordination of Care    Total unit time spent today was greater than 15 minutes. Greater than 50% of total time was spent with the patient and/or patient's relatives and/or coordination of patient's care.     Patient's rights, confidentiality, exceptions to confidentiality, use of electronic medical record including appropriate staff access to medical record regarding behavioral health services and consent to treatment were reviewed.    Note Share:     This note was not shared with the patient due to reasonable likelihood of causing patient harm     Yamila Lopes   Psychiatry, PA-S

## 2024-10-28 NOTE — SOCIAL WORK
This writer received voice message from patient's brother in law Moncada([phone]), pertaining his visit with patient tomorrow. This writer returned called and left voice message.

## 2024-10-28 NOTE — PROGRESS NOTES
10/28/24 0951   Team Meeting   Meeting Type Tx Team Meeting   Initial Conference Date 10/28/24   Next Conference Date 11/11/24   Team Members Present   Team Members Present Physician;Nurse;;Other (Discipline and Name)   Physician Team Member Morgan Rosario   Nursing Team Member Claudia   Social Work Team Member Jose J ORTEGA   Other (Discipline and Name) Gaby CMP MHDS   Patient/Family Present   Patient Present Yes   Patient's Family Present No   OTHER   Team Meeting - Additional Comments Patient attened his treatment team meeting with the [facility]. Patient shared his completed self assessment form. The patient discussed continued AH and depression. Patient currently awaiting [facility] funding for ACT services prior to discharge. [facility] reports they have attempted t cntact his Rep Payee but they have not been successful. The [facility] discussed hey are working on discharging clients who are receiving [facility] funding.

## 2024-10-28 NOTE — ASSESSMENT & PLAN NOTE
Reviewed on 10/28/24  Follows with medical  No associated orders from this encounter found during lookback period of 72 hours.

## 2024-10-28 NOTE — NURSING NOTE
"Alberto was calm and cooperative during assessment. He reports mild anxiety and depression and denies SI, SH, HI, and VH. He reports AH of a voice saying \"it will kill me and my family,\" but states he is able to ignore the AH easily by doing distraction activities. He was medication compliant and requested PRN tums and nicorette gum this shift. He attended meals in the milieu and was social with peers. He denies new concerns at this time.   "

## 2024-10-28 NOTE — PLAN OF CARE
Problem: Alteration in Thoughts and Perception  Goal: Verbalize thoughts and feelings  Description: Interventions:  - Promote a nonjudgmental and trusting relationship with the patient through active listening and therapeutic communication  - Assess patient's level of functioning, behavior and potential for risk  - Engage patient in 1 on 1 interactions  - Encourage patient to express fears, feelings, frustrations, and discuss symptoms    - [place] patient to reality, help patient recognize reality-based thinking   - Administer medications as ordered and assess for potential side effects  - Provide the patient education related to the signs and symptoms of the illness and desired effects of prescribed medications  Outcome: Progressing  Goal: Refrain from acting on delusional thinking/internal stimuli  Description: Interventions:  - Monitor patient closely, per order   - Utilize least restrictive measures   - Set reasonable limits, give positive feedback for acceptable   - Administer medications as ordered and monitor of potential side effects  Outcome: Progressing  Goal: Agree to be compliant with medication regime, as prescribed and report medication side effects  Description: Interventions:  - Offer appropriate PRN medication and supervise ingestion; conduct AIMS, as needed   Outcome: Progressing  Goal: Attend and participate in unit activities, including therapeutic, recreational, and educational groups  Description: Interventions:  -Encourage Visitation and family involvement in care  Outcome: Progressing  Goal: Recognize dysfunctional thoughts, communicate reality-based thoughts at the time of discharge  Description: Interventions:  - Provide medication and psycho-education to assist patient in compliance and developing insight into his/her illness   Outcome: Progressing  Goal: Complete daily ADLs, including personal hygiene independently, as able  Description: Interventions:  - Observe, teach, and assist  patient with ADLS  - Monitor and promote a balance of rest/activity, with adequate nutrition and elimination   Outcome: Progressing     Problem: Alteration in Thoughts and Perception  Goal: Treatment Goal: Gain control of psychotic behaviors/thinking, reduce/eliminate presenting symptoms and demonstrate improved reality functioning upon discharge  Outcome: Not Progressing

## 2024-10-28 NOTE — ASSESSMENT & PLAN NOTE
Reviewed on 10/28/24  Follows with medical  Continue famotidine 20 mg tablet BID per medical team   Continue glycopyrrolate 1 mg tablet BID AM and afternoon per medical team  Continue glycopyrrolate 2 mg tablet before bed per medical team  Continue pantoprazole EC 20 mg tablet every morning per medical team  No associated orders from this encounter found during lookback period of 72 hours.

## 2024-10-28 NOTE — NURSING NOTE
Pt is visible on the unit and social with select peers. Consumed 50% of dinner. Took medications without incidence. Pt is pleasant and cooperative, bright and smiling. Attended 9/10 groups. Reports anxiety and depression related to AH that threaten to kill him and his family. Pt uses the punching bag to cope and reports having a goal of exercising more often. Denies SI/HI/VH. No behavioral issues. Pt offers no complaints.

## 2024-10-28 NOTE — PROGRESS NOTES
10/28/24 0887   Team Meeting   Meeting Type Daily Rounds   Team Members Present   Team Members Present Physician;Nurse;;Other (Discipline and Name)   Physician Team Member Holter, Thomas, Spoleti   Nursing Team Member Claudia   Social Work Team Member Jose J ORTEGA   Other (Discipline and Name) Wang PCM   Patient/Family Present   Patient Present No   Patient's Family Present No     Groups Participation  /9.

## 2024-10-28 NOTE — ASSESSMENT & PLAN NOTE
Reviewed on 10/28/24  Ongoing by medical  No associated orders from this encounter found during lookback period of 72 hours.

## 2024-10-28 NOTE — PROGRESS NOTES
10/28/24 4759   Team Meeting   Meeting Type Daily Rounds   Team Members Present   Team Members Present Physician;Nurse;;Other (Discipline and Name)   Physician Team Member Holter, Thomas, Spoleti   Nursing Team Member Claudia   Social Work Team Member Jose J ORTEGA   Other (Discipline and Name) Wang PCM   Patient/Family Present   Patient Present No   Patient's Family Present No     Groups Participation  6/9.   Patient's compliant with medications.He's pleasant and cooperative. He reports depression and AH. Pending ACT services with [facility] funding.

## 2024-10-28 NOTE — ASSESSMENT & PLAN NOTE
Reviewed on 10/28/24  Continue to encourage cessation upon discharge  No associated orders from this encounter found during lookback period of 72 hours.

## 2024-10-28 NOTE — ASSESSMENT & PLAN NOTE
Reviewed on 10/28/24    Pt reports continued depression, anxiety, and feeling overwhelmed. He reports symptoms consistent with AH, noting voices that are threatening to hurt him, but denies symptoms consistent with VH. He denies SI and HI at this time. Nursing reports he has been more social and brighter throughout weekend.     Continue Clozapine 225 mg qhs for psychosis -- to consider further careful titration for Sxs depending on response  Continue to obtain CBC with diff, CRP, and CK (weekly on Tues), and a Clozapine level and BNP (every 2 weeks on Tues)    Continue Abilify 30 mg qd for psychosis  Continue Escitalopram 20 mg qd for depression and anxiety  Continue Senna-Docusate sodium two 50 mg tablets daily before bed for constipation  Continue propanolol 10 mg bid for anxiety   Continue amlodipine 5 mg PO daily     Pt remains on dual antipsychotic Tx due to failure on monotherapy      ACT team referral was made  No associated orders from this encounter found during lookback period of 72 hours.

## 2024-10-29 LAB
BASOPHILS # BLD AUTO: 0.07 THOUSANDS/ÂΜL (ref 0–0.1)
BASOPHILS NFR BLD AUTO: 1 % (ref 0–1)
BNP SERPL-MCNC: 8 PG/ML (ref 0–100)
CRP SERPL QL: 6.4 MG/L
EOSINOPHIL # BLD AUTO: 0.32 THOUSAND/ÂΜL (ref 0–0.61)
EOSINOPHIL NFR BLD AUTO: 4 % (ref 0–6)
ERYTHROCYTE [DISTWIDTH] IN BLOOD BY AUTOMATED COUNT: 14.3 % (ref 11.6–15.1)
HCT VFR BLD AUTO: 42.6 % (ref 36.5–49.3)
HGB BLD-MCNC: 13.5 G/DL (ref 12–17)
IMM GRANULOCYTES # BLD AUTO: 0.02 THOUSAND/UL (ref 0–0.2)
IMM GRANULOCYTES NFR BLD AUTO: 0 % (ref 0–2)
LYMPHOCYTES # BLD AUTO: 3.25 THOUSANDS/ÂΜL (ref 0.6–4.47)
LYMPHOCYTES NFR BLD AUTO: 40 % (ref 14–44)
MCH RBC QN AUTO: 23.9 PG (ref 26.8–34.3)
MCHC RBC AUTO-ENTMCNC: 31.7 G/DL (ref 31.4–37.4)
MCV RBC AUTO: 75 FL (ref 82–98)
MONOCYTES # BLD AUTO: 0.73 THOUSAND/ÂΜL (ref 0.17–1.22)
MONOCYTES NFR BLD AUTO: 9 % (ref 4–12)
NEUTROPHILS # BLD AUTO: 3.8 THOUSANDS/ÂΜL (ref 1.85–7.62)
NEUTS SEG NFR BLD AUTO: 46 % (ref 43–75)
NRBC BLD AUTO-RTO: 0 /100 WBCS
PLATELET # BLD AUTO: 261 THOUSANDS/UL (ref 149–390)
PMV BLD AUTO: 10.9 FL (ref 8.9–12.7)
RBC # BLD AUTO: 5.65 MILLION/UL (ref 3.88–5.62)
WBC # BLD AUTO: 8.19 THOUSAND/UL (ref 4.31–10.16)

## 2024-10-29 PROCEDURE — 99232 SBSQ HOSP IP/OBS MODERATE 35: CPT | Performed by: PSYCHIATRY & NEUROLOGY

## 2024-10-29 PROCEDURE — 82553 CREATINE MB FRACTION: CPT

## 2024-10-29 PROCEDURE — 85025 COMPLETE CBC W/AUTO DIFF WBC: CPT

## 2024-10-29 PROCEDURE — 83880 ASSAY OF NATRIURETIC PEPTIDE: CPT

## 2024-10-29 PROCEDURE — 82550 ASSAY OF CK (CPK): CPT

## 2024-10-29 PROCEDURE — 86140 C-REACTIVE PROTEIN: CPT

## 2024-10-29 RX ADMIN — GLYCOPYRROLATE 2 MG: 1 TABLET ORAL at 21:10

## 2024-10-29 RX ADMIN — GLYCOPYRROLATE 1 MG: 1 TABLET ORAL at 08:45

## 2024-10-29 RX ADMIN — NICOTINE POLACRILEX 2 MG: 2 GUM, CHEWING ORAL at 21:11

## 2024-10-29 RX ADMIN — PROPRANOLOL HYDROCHLORIDE 10 MG: 10 TABLET ORAL at 21:10

## 2024-10-29 RX ADMIN — NICOTINE POLACRILEX 2 MG: 2 GUM, CHEWING ORAL at 09:00

## 2024-10-29 RX ADMIN — SENNOSIDES AND DOCUSATE SODIUM 2 TABLET: 8.6; 5 TABLET ORAL at 21:10

## 2024-10-29 RX ADMIN — PROPRANOLOL HYDROCHLORIDE 10 MG: 10 TABLET ORAL at 08:44

## 2024-10-29 RX ADMIN — NICOTINE POLACRILEX 2 MG: 2 GUM, CHEWING ORAL at 17:37

## 2024-10-29 RX ADMIN — LORATADINE 10 MG: 10 TABLET ORAL at 08:45

## 2024-10-29 RX ADMIN — NICOTINE POLACRILEX 2 MG: 2 GUM, CHEWING ORAL at 13:37

## 2024-10-29 RX ADMIN — CLOZAPINE 225 MG: 25 TABLET ORAL at 21:10

## 2024-10-29 RX ADMIN — MULTIPLE VITAMINS W/ MINERALS TAB 1 TABLET: TAB ORAL at 08:45

## 2024-10-29 RX ADMIN — GLYCOPYRROLATE 1 MG: 1 TABLET ORAL at 15:09

## 2024-10-29 RX ADMIN — MELATONIN TAB 3 MG 9 MG: 3 TAB at 21:10

## 2024-10-29 RX ADMIN — ARIPIPRAZOLE 30 MG: 15 TABLET ORAL at 08:44

## 2024-10-29 RX ADMIN — ESCITALOPRAM OXALATE 20 MG: 10 TABLET, FILM COATED ORAL at 08:44

## 2024-10-29 RX ADMIN — ATROPINE SULFATE 1 DROP: 10 SOLUTION/ DROPS OPHTHALMIC at 21:10

## 2024-10-29 RX ADMIN — PANTOPRAZOLE SODIUM 20 MG: 20 TABLET, DELAYED RELEASE ORAL at 08:45

## 2024-10-29 NOTE — ASSESSMENT & PLAN NOTE
Reviewed on 10/29/24  Follows with medical  Continue famotidine 20 mg tablet BID per medical team   Continue glycopyrrolate 1 mg tablet BID AM and afternoon per medical team  Continue glycopyrrolate 2 mg tablet before bed per medical team  Continue pantoprazole EC 20 mg tablet every morning per medical team  No associated orders from this encounter found during lookback period of 72 hours.

## 2024-10-29 NOTE — NURSING NOTE
Alberto maintain on ongoing SAFE precaution without incident on this shift. Observed resting in bed, respiration even and unlabored. Continuous Q 15 minutes check implemented thru the night. Schedule lab to be obtain this morning, BNP, C-reactive protein, CBC, Clozapine, Creatine (CK) MB and total.  No behavioral noted

## 2024-10-29 NOTE — ASSESSMENT & PLAN NOTE
Reviewed on 10/29/24  Ongoing by medical  No associated orders from this encounter found during lookback period of 72 hours.

## 2024-10-29 NOTE — PROGRESS NOTES
Progress Note - Behavioral Health   Name: Alberto Berumen 29 y.o. male I MRN: [id number]  Unit/Bed#: [facility] 112-02 I Date of Admission: 3/29/2024   Date of Service: 10/29/2024 I Hospital Day: 214     Assessment & Plan  Schizoaffective disorder, bipolar type (HCC)  Reviewed on 10/29/24    Pt reports continued depression and anxiety. He reports symptoms consistent with AH, noting voices that are threatening to hurt him, but denies symptoms consistent with VH. He denies SI and HI at this time. Nursing reports he has been brighter and more social.      Continue Clozapine 225 mg qhs for psychosis -- to consider further careful titration for Sxs depending on response  Continue to obtain CBC with diff, CRP, and CK (weekly on Tues), and a Clozapine level and BNP (every 2 weeks on Tues)    Continue Abilify 30 mg qd for psychosis  Continue Escitalopram 20 mg qd for depression and anxiety  Continue Senna-Docusate sodium two 50 mg tablets daily before bed for constipation  Continue propanolol 10 mg bid for anxiety   Continue amlodipine 5 mg PO daily     Pt remains on dual antipsychotic Tx due to failure on monotherapy      ACT team referral was made  No associated orders from this encounter found during lookback period of 72 hours.    Chronic idiopathic constipation  Reviewed on 10/29/24  Follows with medical  Continue senna-docusate sodium two tablets before bed per medical team  No associated orders from this encounter found during lookback period of 72 hours.  GERD (gastroesophageal reflux disease)  Reviewed on 10/29/24  Follows with medical  Continue famotidine 20 mg tablet BID per medical team   Continue glycopyrrolate 1 mg tablet BID AM and afternoon per medical team  Continue glycopyrrolate 2 mg tablet before bed per medical team  Continue pantoprazole EC 20 mg tablet every morning per medical team  No associated orders from this encounter found during lookback period of 72 hours.  Medical clearance for psychiatric  "admission  Reviewed on 10/29/24  Ongoing by medical  No associated orders from this encounter found during lookback period of 72 hours.    Tobacco abuse  Reviewed on 10/29/24  Continue to encourage cessation upon discharge  No associated orders from this encounter found during lookback period of 72 hours.  Elevated hemoglobin A1c  Reviewed on 10/28/24  Follows with medical  No associated orders from this encounter found during lookback period of 72 hours.      [facility]  Psychiatric Progress Note - Department of Behavioral Health   Alberto Berumen 29 y.o. male MRN: [id number]  Unit/Bed#: [facility] 112-02 Encounter: [id number]    SUBJECTIVE     Patient evaluated this a.m. for continuity of care. Patient was discussed at length with nursing and treatment team. Per nursing, patient has been bright, social, and pleasant. He continues to endorse symptoms consistent with AH, but denies VH. Patient has a visit with his brother in law at noon today. He will also be getting labs completed later today. He attended 9 out of 9 groups yesterday.      During evaluation, no acute distress is noted. Alberto Berumen is calm and cooperative this morning. He states that he is feeling \"okay but tired\" this morning, but notes that he got good sleep last night. He reports that his symptoms of depression and anxiety remain unchanged, and that he continues to have symptoms consistent with AH. He notes that the voices threaten to hurt him. He denies symptoms consistent with VH, and denies SI and HI at this time. He does not appear to be responding to internal stimuli at the time of this interview.     Patient is excited for a visit from his brother-in-law later today. He did not get up for breakfast due to lack of appetite, but was on his way to attend the morning walking group. He hopes to workout later today.      PSYCHIATRIC REVIEW OF SYSTEMS     Behavior over the last 24 hours:  improved  Sleep: normal  Appetite: " "normal  Medication side effects: No    Progress Toward Goals: Patient is progressing positively towards goals, as evidenced by his participation in individual, social and therapeutic milieu in addition to adherence to his medication regimen.  Patient Acceptance: Patient is accepting of and cooperative with his treatment regimen.  Patient Understanding: Patient is understanding of his treatment plan, and acknowledges his symptoms that require treatment.   Patient Involvement in Reintegration Process: Patient is in frequent contact with family, and has a visit from his brother-in-law planned for later this afternoon.   Patient's Therapeutic Relationship with Psychiatrist: Trusting  Patient's Optimism Regarding Recovery: Patient continues to adhere to treatment regimen, and is in contact with his family.   Group Attendance: Patient attended 9 out of 9 groups yesterday.     REVIEW OF SYSTEMS   Review of systems: no complaints    OBJECTIVE     Vital Signs in Past 24 Hours:  Temp:  [97.5 °F (36.4 °C)-98 °F (36.7 °C)] 97.5 °F (36.4 °C)  HR:  [75-99] 75  BP: (108-137)/(66-84) 108/66  Resp:  [18] 18  SpO2:  [99 %] 99 %    Intake/Output in Past 24 hours:  I/O last 3 completed shifts:  In: 240 [P.O.:240]  Out: -   No intake/output data recorded.        Laboratory Results:  I have personally reviewed all pertinent laboratory/tests results.  Labs in last 72 hours: No results for input(s): \"WBC\", \"RBC\", \"HGB\", \"HCT\", \"PLT\", \"RDW\", \"TOTANEUTABS\", \"NEUTROABS\", \"SODIUM\", \"K\", \"CL\", \"CO2\", \"BUN\", \"CREATININE\", \"GLUC\", \"GLUF\", \"CALCIUM\", \"AST\", \"ALT\", \"ALKPHOS\", \"TP\", \"ALB\", \"TBILI\", \"CHOLESTEROL\", \"HDL\", \"TRIG\", \"LDLCALC\", \"VALPROICTOT\", \"CARBAMAZEPIN\", \"LITHIUM\", \"AMMONIA\", \"[id number]\", \"FREET4\", \"T3FREE\", \"PREGTESTUR\", \"PREGSERUM\", \"HCG\", \"HCGQUANT\", \"RPR\" in the last 72 hours.    Behavioral Health Medications: all current active meds have been reviewed.    Current Facility-Administered Medications   Medication Dose Route " Frequency Provider Last Rate    acetaminophen  650 mg Oral Q4H PRN Jordan C Holter, DO      acetaminophen  650 mg Oral Q6H PRN HOLLI Lion      aluminum-magnesium hydroxide-simethicone  30 mL Oral Q4H PRN Jordan C Holter, DO      amLODIPine  5 mg Oral Daily HOLLI Lion      ARIPiprazole  30 mg Oral Daily Bora Rosario MD      Artificial Tears  1 drop Both Eyes Q3H PRN Jordan C Holter, DO      atropine  1 drop Sublingual HS Harjeet TorresDO      haloperidol lactate  2.5 mg Intramuscular Q4H PRN Max 4/day HOLLI Lion      And    LORazepam  1 mg Intramuscular Q4H PRN Max 4/day HOLLI Lion      And    benztropine  0.5 mg Intramuscular Q4H PRN Max 4/day HOLLI Lion      benztropine  1 mg Intramuscular Q4H PRN Max 6/day Jordan C Holter, DO      haloperidol lactate  5 mg Intramuscular Q4H PRN Max 4/day HOLLI Lion      And    LORazepam  2 mg Intramuscular Q4H PRN Max 4/day HOLLI Lion      And    benztropine  1 mg Intramuscular Q4H PRN Max 4/day HOLLI Lion      benztropine  1 mg Oral Q4H PRN Max 6/day HOLLI Lion      benztropine  1 mg Oral Q4H PRN Max 6/day Jordan C Holter, DO      bisacodyl  10 mg Rectal Daily PRN HOLLI Lion      calcium carbonate  500 mg Oral BID PRN HOLLI Monge      cloZAPine  225 mg Oral HS Hardik So MD      hydrOXYzine HCL  50 mg Oral Q6H PRN Max 4/day Jordan C Holter, DO      Or    diphenhydrAMINE  50 mg Intramuscular Q6H PRN Jordan C Holter, DO      hydrOXYzine HCL  50 mg Oral Q6H PRN Max 4/day HOLLI Lion      Or    diphenhydrAMINE  50 mg Intramuscular Q6H PRN HOLLI Lion      diphenhydrAMINE-zinc acetate   Topical BID PRN HOLLI Lion      escitalopram  20 mg Oral Daily HOLLI Lion      famotidine  20 mg Oral BID PRN HOLLI Monge      fluticasone  1 spray Each Nare Daily PRN HOLLI Monge      glycopyrrolate  1 mg Oral BID (AM &  Afternoon) Bora Rosario MD      glycopyrrolate  2 mg Oral HS Bora Rosario MD      haloperidol  1 mg Oral Q6H PRN HOLLI Lion      haloperidol  2.5 mg Oral Q4H PRN Max 4/day HOLLI Lion      haloperidol  5 mg Oral Q4H PRN Max 4/day HOLLI Lion      hydroCHLOROthiazide  12.5 mg Oral Daily HOLLI Galvan      hydrocortisone   Topical 4x Daily PRN HOLLI Lion      hydrOXYzine HCL  100 mg Oral Q6H PRN Max 4/day [facility] Holter, DO      Or    LORazepam  2 mg Intramuscular Q6H PRN [facility] Holter, DO      hydrOXYzine HCL  100 mg Oral Q6H PRN Max 4/day HOLLI Lion      Or    LORazepam  2 mg Intramuscular Q6H PRN HOLLI Lion      hydrOXYzine HCL  25 mg Oral Q6H PRN Max 4/day [facility] Holter, DO      ibuprofen  600 mg Oral Q8H PRN HOLLI Lion      influenza vaccine  0.5 mL Intramuscular Once Bora Rosario MD      loratadine  10 mg Oral Daily Brina Guillen MD      melatonin  3 mg Oral HS PRN Bora Rosario MD      melatonin  9 mg Oral HS Broa Rosario MD      methocarbamol  500 mg Oral Q6H PRN HOLLI Lion      multivitamin-minerals  1 tablet Oral Daily HOLLI Monge      nicotine polacrilex  2 mg Oral Q4H PRN Bora Rosario MD      OLANZapine  5 mg Oral Q4H PRN Max 3/day [facility] Holter, DO      Or    OLANZapine  2.5 mg Intramuscular Q4H PRN Max 3/day [facility] Holter, DO      OLANZapine  5 mg Oral Q3H PRN Max 3/day [facility] Holter, DO      Or    OLANZapine  5 mg Intramuscular Q3H PRN Max 3/day [facility] Holter, DO      OLANZapine  2.5 mg Oral Q4H PRN Max 6/day [facility] Holter, DO      ondansetron  4 mg Oral Q6H PRN HOLLI Lion      [facility]antoprazole  [facility]      polyethylene glycol  17 g Oral Daily PRN HOLLI Ghotra      propranolol  10 mg Oral Q12H KAYLIE HOLLI Lion      senna-docusate sodium  1 tablet Oral Daily PRN Jordan C Holter, DO senna-docusate sodium  2 tablet Oral HS Melvina De Jesus DO       "traZODone  50 mg Oral HS PRN HOLLI Lion      white petrolatum-mineral oil   Topical TID PRN HOLLI Lion         Risks, benefits and possible side effects of Medications:   Risks, benefits, and possible side effects of medications explained to patient and patient verbalizes understanding.      Dual Antipsychotic Rationale: Currently on dual antipsychotics due to inefficacy of prior treatment.      Mental Status Evaluation:  Appearance:  Patient appears slightly disheveled, but in no acute distress. He is wearing a black sweatshirt and light green hospital pants.    Behavior:  Patient was laying on his bed at the time of the interview. He is calm, pleasant, and cooperative. No stereotyped behaviors are noted.    Speech:  normal rate, normal volume, normal pitch, fluent, clear, coherent   Mood:  \"Okay but tired\"   Affect:  Depressed, mood-congruent, stable throughout interview, appropriate to thought content    Thought Process:  organized, logical, coherent, goal directed, linear, normal rate of thoughts   Associations: intact associations   Thought Content:  Patient notes persecutory delusions in regards to his voices threatening to harm and kill him. He denies symptoms consistent with grandiose delusions. He denies SI and HI.    Perceptual Disturbances: Patient endorses symptoms consistent with AH, but denies VH. He does not appear to be responding to internal stimuli at the time of this interview.    Risk Potential: Suicidal ideation - denies  Homicidal ideation - denies  Potential for aggression - Not at present   Sensorium:  oriented to person, place, and time/date   Memory:  recent and remote memory grossly intact   Consciousness:  alert and awake   Attention/Concentration: attention span and concentration are age appropriate   Insight:  good   Judgment: good   Gait/Station: normal gait/station   Motor Activity: no abnormal movements     Recommended Treatment:   See above for assessment and " plan.    Counseling/Coordination of Care    Total unit time spent today was greater than 15 minutes. Greater than 50% of total time was spent with the patient and/or patient's relatives and/or coordination of patient's care.     Patient's rights, confidentiality, exceptions to confidentiality, use of electronic medical record including appropriate staff access to medical record regarding behavioral health services and consent to treatment were reviewed.    Note Share:     This note was not shared with the patient due to reasonable likelihood of causing patient harm     Yamila Lopes   Psychiatry, PA-S

## 2024-10-29 NOTE — PROGRESS NOTES
10/29/24 0900   Team Meeting   Meeting Type Daily Rounds   Team Members Present   Team Members Present Physician;Nurse;;Other (Discipline and Name)   Physician Team Member Abraham   Nursing Team Member Claudia   Social Work Team Member Jose J ORTEGA   Other (Discipline and Name) Wang PCM   Patient/Family Present   Patient Present No   Patient's Family Present No     Groups Participation  8/9.   Patient's compliant with medications. He's engaged in his treatment. He's appropriate and pleasant. Awaiting funding from [facility] services to be discharged home. Visit with family today.

## 2024-10-29 NOTE — ASSESSMENT & PLAN NOTE
Reviewed on 10/29/24  Continue to encourage cessation upon discharge  No associated orders from this encounter found during lookback period of 72 hours.

## 2024-10-29 NOTE — ASSESSMENT & PLAN NOTE
Reviewed on 10/29/24    Pt reports continued depression and anxiety. He reports symptoms consistent with AH, noting voices that are threatening to hurt him, but denies symptoms consistent with VH. He denies SI and HI at this time. Nursing reports he has been brighter and more social.      Continue Clozapine 225 mg qhs for psychosis -- to consider further careful titration for Sxs depending on response  Continue to obtain CBC with diff, CRP, and CK (weekly on Tues), and a Clozapine level and BNP (every 2 weeks on Tues)    Continue Abilify 30 mg qd for psychosis  Continue Escitalopram 20 mg qd for depression and anxiety  Continue Senna-Docusate sodium two 50 mg tablets daily before bed for constipation  Continue propanolol 10 mg bid for anxiety   Continue amlodipine 5 mg PO daily     Pt remains on dual antipsychotic Tx due to failure on monotherapy      ACT team referral was made  No associated orders from this encounter found during lookback period of 72 hours.

## 2024-10-29 NOTE — PLAN OF CARE
Problem: Alteration in Thoughts and Perception  Goal: Treatment Goal: Gain control of psychotic behaviors/thinking, reduce/eliminate presenting symptoms and demonstrate improved reality functioning upon discharge  Outcome: Progressing  Goal: Verbalize thoughts and feelings  Description: Interventions:  - Promote a nonjudgmental and trusting relationship with the patient through active listening and therapeutic communication  - Assess patient's level of functioning, behavior and potential for risk  - Engage patient in 1 on 1 interactions  - Encourage patient to express fears, feelings, frustrations, and discuss symptoms    - [place] patient to reality, help patient recognize reality-based thinking   - Administer medications as ordered and assess for potential side effects  - Provide the patient education related to the signs and symptoms of the illness and desired effects of prescribed medications  Outcome: Progressing  Goal: Refrain from acting on delusional thinking/internal stimuli  Description: Interventions:  - Monitor patient closely, per order   - Utilize least restrictive measures   - Set reasonable limits, give positive feedback for acceptable   - Administer medications as ordered and monitor of potential side effects  Outcome: Progressing  Goal: Agree to be compliant with medication regime, as prescribed and report medication side effects  Description: Interventions:  - Offer appropriate PRN medication and supervise ingestion; conduct AIMS, as needed   Outcome: Progressing  Goal: Attend and participate in unit activities, including therapeutic, recreational, and educational groups  Description: Interventions:  -Encourage Visitation and family involvement in care  Outcome: Progressing  Goal: Recognize dysfunctional thoughts, communicate reality-based thoughts at the time of discharge  Description: Interventions:  - Provide medication and psycho-education to assist patient in compliance and developing  insight into his/her illness   Outcome: Progressing  Goal: Complete daily ADLs, including personal hygiene independently, as able  Description: Interventions:  - Observe, teach, and assist patient with ADLS  - Monitor and promote a balance of rest/activity, with adequate nutrition and elimination   Outcome: Progressing     Problem: Ineffective Coping  Goal: Demonstrates healthy coping skills  Outcome: Progressing  Goal: Participates in unit activities  Description: Interventions:  - Provide therapeutic environment   - Provide required programming   - Redirect inappropriate behaviors   Outcome: Progressing  Goal: Patient/Family participate in treatment and DC plans  Description: Interventions:  - Provide therapeutic environment  Outcome: Progressing  Goal: Patient/Family verbalizes awareness of resources  Outcome: Progressing     Problem: Depression  Goal: Refrain from harming self  Description: Interventions:  - Monitor patient closely, per order   - Supervise medication ingestion, monitor effects and side effects   Outcome: Progressing  Goal: Refrain from isolation  Description: Interventions:  - Develop a trusting relationship   - Encourage socialization   Outcome: Progressing  Goal: Refrain from self-neglect  Outcome: Progressing  Goal: Attend and participate in unit activities, including therapeutic, recreational, and educational groups  Description: Interventions:  - Provide therapeutic and educational activities daily, encourage attendance and participation, and document same in the medical record   Outcome: Progressing  Goal: Complete daily ADLs, including personal hygiene independently, as able  Description: Interventions:  - Observe, teach, and assist patient with ADLS  -  Monitor and promote a balance of rest/activity, with adequate nutrition and elimination   Outcome: Progressing     Problem: Anxiety  Goal: Anxiety is at manageable level  Description: Interventions:  - Assess and monitor patient's anxiety  level.   - Monitor for signs and symptoms (heart palpitations, chest pain, shortness of breath, headaches, nausea, feeling jumpy, restlessness, irritable, apprehensive).   - Collaborate with interdisciplinary team and initiate plan and interventions as ordered.  - [place] patient to unit/surroundings  - Explain treatment plan  - Encourage participation in care  - Encourage verbalization of concerns/fears  - Identify coping mechanisms  - Assist in developing anxiety-reducing skills  - Administer/offer alternative therapies  - Limit or eliminate stimulants  Outcome: Progressing     Problem: Alteration in Orientation  Goal: Treatment Goal: Demonstrate a reduction of confusion and improved orientation to person, place, time and/or situation upon discharge, according to optimum baseline/ability  Outcome: Progressing  Goal: Interact with staff daily  Description: Interventions:  - Assess and re-assess patient's level of orientation  - Engage patient in 1 on 1 interactions, daily, for a minimum of 15 minutes   - Establish rapport/trust with patient   Outcome: Progressing  Goal: Express concerns related to confused thinking related to:  Description: Interventions:  - Encourage patient to express feelings, fears, frustrations, hopes  - Assign consistent caregivers   - [place]/re-orient patient as needed  - Allow comfort items, as appropriate  - Provide visual cues, signs, etc.   Outcome: Progressing  Goal: Cooperate with recommended testing/procedures  Description: Interventions:  - Determine need for ancillary testing  - Observe for mental status changes  - Implement falls/precaution protocol   Outcome: Progressing  Goal: Attend and participate in unit activities, including therapeutic, recreational, and educational groups  Description: Interventions:  - Provide therapeutic and educational activities daily, encourage attendance and participation, and document same in the medical record   - Provide appropriate opportunities  for reminiscence   - Provide a consistent daily routine   - Encourage family contact/visitation   Outcome: Progressing  Goal: Complete daily ADLs, including personal hygiene independently, as able  Description: Interventions:  - Observe, teach, and assist patient with ADLS  Outcome: Progressing     Problem: DISCHARGE PLANNING - CARE MANAGEMENT  Goal: Discharge to post-acute care or home with appropriate resources  Description: INTERVENTIONS:  - Conduct assessment to determine patient/family and health care team treatment goals, and need for post-acute services based on payer coverage, community resources, and patient preferences, and barriers to discharge  - Address psychosocial, clinical, and financial barriers to discharge as identified in assessment in conjunction with the patient/family and health care team  - Arrange appropriate level of post-acute services according to patient's   needs and preference and payer coverage in collaboration with the physician and health care team  - Communicate with and update the patient/family, physician, and health care team regarding progress on the discharge plan  - Arrange appropriate transportation to post-acute venues  Outcome: Progressing

## 2024-10-29 NOTE — NURSING NOTE
"Alberto was calm and cooperative during assessment. He denies SI, SH, HI, and VH, and reports stable AH of a voice saying \"it wants to kill me and my family.\" He states that he is able to distract himself by walking and interacting with peers. He is social with peers and attended group programming and meals in the milieu. He was compliant with medications and requested PRN nicorette gum. He had a family visit with his brother-in-law this afternoon and he stated this went well. He denies new concerns at this time.   "

## 2024-10-29 NOTE — ASSESSMENT & PLAN NOTE
Reviewed on 10/29/24  Follows with medical  Continue senna-docusate sodium two tablets before bed per medical team  No associated orders from this encounter found during lookback period of 72 hours.

## 2024-10-30 LAB — MISCELLANEOUS LAB TEST RESULT: NORMAL

## 2024-10-30 PROCEDURE — 99232 SBSQ HOSP IP/OBS MODERATE 35: CPT | Performed by: PSYCHIATRY & NEUROLOGY

## 2024-10-30 RX ADMIN — MELATONIN TAB 3 MG 9 MG: 3 TAB at 21:10

## 2024-10-30 RX ADMIN — GLYCOPYRROLATE 1 MG: 1 TABLET ORAL at 08:49

## 2024-10-30 RX ADMIN — LORATADINE 10 MG: 10 TABLET ORAL at 08:49

## 2024-10-30 RX ADMIN — NICOTINE POLACRILEX 2 MG: 2 GUM, CHEWING ORAL at 08:48

## 2024-10-30 RX ADMIN — MULTIPLE VITAMINS W/ MINERALS TAB 1 TABLET: TAB ORAL at 08:49

## 2024-10-30 RX ADMIN — HYDROCHLOROTHIAZIDE 12.5 MG: 12.5 TABLET ORAL at 08:49

## 2024-10-30 RX ADMIN — AMLODIPINE BESYLATE 5 MG: 5 TABLET ORAL at 08:48

## 2024-10-30 RX ADMIN — ESCITALOPRAM OXALATE 20 MG: 10 TABLET, FILM COATED ORAL at 08:49

## 2024-10-30 RX ADMIN — SENNOSIDES AND DOCUSATE SODIUM 2 TABLET: 8.6; 5 TABLET ORAL at 21:10

## 2024-10-30 RX ADMIN — GLYCOPYRROLATE 2 MG: 1 TABLET ORAL at 21:10

## 2024-10-30 RX ADMIN — CLOZAPINE 225 MG: 25 TABLET ORAL at 21:11

## 2024-10-30 RX ADMIN — ATROPINE SULFATE 1 DROP: 10 SOLUTION/ DROPS OPHTHALMIC at 21:10

## 2024-10-30 RX ADMIN — NICOTINE POLACRILEX 2 MG: 2 GUM, CHEWING ORAL at 13:01

## 2024-10-30 RX ADMIN — PANTOPRAZOLE SODIUM 20 MG: 20 TABLET, DELAYED RELEASE ORAL at 08:49

## 2024-10-30 RX ADMIN — PROPRANOLOL HYDROCHLORIDE 10 MG: 10 TABLET ORAL at 08:49

## 2024-10-30 RX ADMIN — NICOTINE POLACRILEX 2 MG: 2 GUM, CHEWING ORAL at 17:18

## 2024-10-30 RX ADMIN — NICOTINE POLACRILEX 2 MG: 2 GUM, CHEWING ORAL at 21:10

## 2024-10-30 RX ADMIN — PROPRANOLOL HYDROCHLORIDE 10 MG: 10 TABLET ORAL at 21:11

## 2024-10-30 RX ADMIN — GLYCOPYRROLATE 1 MG: 1 TABLET ORAL at 13:01

## 2024-10-30 RX ADMIN — ARIPIPRAZOLE 30 MG: 15 TABLET ORAL at 08:49

## 2024-10-30 NOTE — NURSING NOTE
"Patient is pleasant and cooperative. He is visible and social on the unit. He seems brighter and is smiling more. He is compliant with medications. He attends groups. Patient did not eat breakfast. Patient states \"they give too much food.\" Patient did eat lunch and dinner. He states he continues with auditory hallucinations.NO SI.   "

## 2024-10-30 NOTE — ASSESSMENT & PLAN NOTE
Reviewed on 10/30/24  Follows with medical  Continue famotidine 20 mg tablet BID per medical team   Continue glycopyrrolate 1 mg tablet BID AM and afternoon per medical team  Continue glycopyrrolate 2 mg tablet before bed per medical team  Continue pantoprazole EC 20 mg tablet every morning per medical team  No associated orders from this encounter found during lookback period of 72 hours.

## 2024-10-30 NOTE — ASSESSMENT & PLAN NOTE
Reviewed on 10/30/24  Ongoing by medical  No associated orders from this encounter found during lookback period of 72 hours.

## 2024-10-30 NOTE — NURSING NOTE
Pt is present in the milieu and social with select peers. He consumed 100 % of dinner. Took his medications without incidence. Pt endorses AH of the same voice that is going to kill him and his family. Denies SI/HI/VH. Pt utilizes coping skills and interacts with peers to distract himself. Pt is pleasant and cooperative. Brightens on approach. Pt utilized exercise room. Attended all evening groups. No unmet needs. No behavioral issues.

## 2024-10-30 NOTE — PROGRESS NOTES
10/30/24 0828   Team Meeting   Meeting Type Daily Rounds   Team Members Present   Team Members Present Physician;Nurse;;Other (Discipline and Name)   Physician Team Member Holter, Thomas   Nursing Team Member Thaddeus   Social Work Team Member Jose J ORTEGA   Other (Discipline and Name) Wang PCM   Patient/Family Present   Patient Present No   Patient's Family Present No     Groups Participation 11 /12.   Expected D/C Date:12/19624    Patient's compliant and engaged in his treatment. He's in need of [facility] funding for ACT services to discharge home to his family. He's appropriate,

## 2024-10-30 NOTE — NURSING NOTE
Pt is visible on the unit and social with select peers. Took medications without incidence. Pt is pleasant, cooperative, brightens on approach. Attended 9/11 groups. Reports continued AH that cause depression and anxiety, but pt has been coping using the punching bag to exercise. Denies SI/HI/VH. Pt's insight is improving. No behavioral issues. Pt offers no complaints.

## 2024-10-30 NOTE — ASSESSMENT & PLAN NOTE
Reviewed on 10/30/24  Follows with medical  Continue senna-docusate sodium two tablets before bed per medical team  No associated orders from this encounter found during lookback period of 72 hours.

## 2024-10-30 NOTE — ASSESSMENT & PLAN NOTE
Reviewed on 10/30/24  Continue to encourage cessation upon discharge  No associated orders from this encounter found during lookback period of 72 hours.

## 2024-10-30 NOTE — ASSESSMENT & PLAN NOTE
Reviewed on 10/30/24  Follows with medical  No associated orders from this encounter found during lookback period of 72 hours.

## 2024-10-30 NOTE — ASSESSMENT & PLAN NOTE
Reviewed on 10/30/24    Pt reports continued depression and anxiety. He reports symptoms consistent with AH, continuing to note voices that are threatening to hurt him. He denies symptoms consistent with VH. He denies SI and HI at this time. Nursing reports he has been much brighter and social in the past week.     Continue Clozapine 225 mg qhs for psychosis -- to consider further careful titration for Sxs depending on response  Continue to obtain CBC with diff, CRP, and CK (weekly on Tues), and a Clozapine level and BNP (every 2 weeks on Tues)    Continue Abilify 30 mg qd for psychosis  Continue Escitalopram 20 mg qd for depression and anxiety  Continue Senna-Docusate sodium two 50 mg tablets daily before bed for constipation  Continue propanolol 10 mg bid for anxiety   Continue amlodipine 5 mg PO daily     Pt remains on dual antipsychotic Tx due to failure on monotherapy      ACT team referral was made  No associated orders from this encounter found during lookback period of 72 hours.

## 2024-10-30 NOTE — PROGRESS NOTES
Progress Note - Behavioral Health   Name: Alberto Berumen 29 y.o. male I MRN: [id number]  Unit/Bed#: [facility] 112-02 I Date of Admission: 3/29/2024   Date of Service: 10/30/2024 I Hospital Day: 215     Assessment & Plan  Schizoaffective disorder, bipolar type (HCC)  Reviewed on 10/30/24    Pt reports continued depression and anxiety. He reports symptoms consistent with AH, continuing to note voices that are threatening to hurt him. He denies symptoms consistent with VH. He denies SI and HI at this time. Nursing reports he has been much brighter and social in the past week.     Continue Clozapine 225 mg qhs for psychosis -- to consider further careful titration for Sxs depending on response  Continue to obtain CBC with diff, CRP, and CK (weekly on Tues), and a Clozapine level and BNP (every 2 weeks on Tues)    Continue Abilify 30 mg qd for psychosis  Continue Escitalopram 20 mg qd for depression and anxiety  Continue Senna-Docusate sodium two 50 mg tablets daily before bed for constipation  Continue propanolol 10 mg bid for anxiety   Continue amlodipine 5 mg PO daily     Pt remains on dual antipsychotic Tx due to failure on monotherapy      ACT team referral was made  No associated orders from this encounter found during lookback period of 72 hours.    Chronic idiopathic constipation  Reviewed on 10/30/24  Follows with medical  Continue senna-docusate sodium two tablets before bed per medical team  No associated orders from this encounter found during lookback period of 72 hours.  GERD (gastroesophageal reflux disease)  Reviewed on 10/30/24  Follows with medical  Continue famotidine 20 mg tablet BID per medical team   Continue glycopyrrolate 1 mg tablet BID AM and afternoon per medical team  Continue glycopyrrolate 2 mg tablet before bed per medical team  Continue pantoprazole EC 20 mg tablet every morning per medical team  No associated orders from this encounter found during lookback period of 72 hours.  Medical  "clearance for psychiatric admission  Reviewed on 10/30/24  Ongoing by medical  No associated orders from this encounter found during lookback period of 72 hours.    Tobacco abuse  Reviewed on 10/30/24  Continue to encourage cessation upon discharge  No associated orders from this encounter found during lookback period of 72 hours.  Elevated hemoglobin A1c  Reviewed on 10/30/24  Follows with medical  No associated orders from this encounter found during lookback period of 72 hours.      [facility]  Psychiatric Progress Note - Department of Behavioral Health   Alberto Berumen 29 y.o. male MRN: [id number]  Unit/Bed#: [facility] 112-02 Encounter: [id number]    SUBJECTIVE     Patient evaluated this a.m. for continuity of care. Patient was discussed at length with nursing and treatment team. Per nursing, patient has been much more social and bright over the past week. He reports depression, anxiety, and AH. Nursing notes that patient has had low appetite, with meal consumption yesterday of 0%, 25% + ensure, and 100%. Patient attended 11 out of 12 groups yesterday.     During evaluation, no acute distress is noted. Alberto Berumen is feeling \"okay\" this morning, but tired. He is calm and cooperative at time of interview, and preparing to get out of bed for the morning walk. He notes unchanged feelings of depression and anxiety, and reports symptoms consistent with auditory hallucinations that threaten to harm him. When asked, he notes feeling otherwise safe on the unit. He denies symptoms consistent with VH, and denies SI and HI. He does not appear to be responding to internal stimuli at this time.      He states that his brother in-law visit yesterday was \"dope\". He does not have any more family visits in the immediate future. He did not get up for breakfast this morning, with continued reports of low appetite. He intends to work out later today.   PSYCHIATRIC REVIEW OF SYSTEMS     Behavior over the " last 24 hours:  improved  Sleep: normal  Appetite: poor  Medication side effects: No    Progress Toward Goals: Patient has been progressing positively towards goals, as evidenced by his participation in individual, social and therapeutic milieu in addition to adherence to his medication regimen.  Patient Acceptance: Patient is accepting of and cooperative with his treatment regimen.  Patient Understanding: Patient is understanding of his need for treatment, and adherent to treatment plan.   Patient Involvement in Reintegration Process: Patient continues to be in contact with his family, and adhere to treatment regimen.  Patient's Therapeutic Relationship with Psychiatrist: Trusting  Patient's Optimism Regarding Recovery: Patient continues to cooperate with regimen and actively attend groups. He is in contact with family.   Group Attendance: Patient attended 11 out of 12 groups yesterday.     REVIEW OF SYSTEMS   Review of systems: no complaints    OBJECTIVE     Vital Signs in Past 24 Hours:  Temp:  [97.4 °F (36.3 °C)-97.5 °F (36.4 °C)] 97.4 °F (36.3 °C)  HR:  [] 57  BP: (116-130)/(60-82) 128/82  Resp:  [18] 18  SpO2:  [97 %-100 %] 100 %  O2 Device: None (Room air)    Intake/Output in Past 24 hours:  I/O last 3 completed shifts:  In: 240 [P.O.:240]  Out: -   No intake/output data recorded.        Laboratory Results:  I have personally reviewed all pertinent laboratory/tests results.  Labs in last 72 hours:   Recent Labs     10/29/24  0901   WBC 8.19   RBC 5.65*   HGB 13.5   HCT 42.6      RDW 14.3   NEUTROABS 3.80       Behavioral Health Medications: all current active meds have been reviewed.    Current Facility-Administered Medications   Medication Dose Route Frequency Provider Last Rate    acetaminophen  650 mg Oral Q4H PRN Jordan C Holter, DO      acetaminophen  650 mg Oral Q6H PRN HOLLI Lion      aluminum-magnesium hydroxide-simethicone  30 mL Oral Q4H PRN Jordan C Holter, DO      amLODIPine   5 mg Oral Daily HOLLI Lion      ARIPiprazole  30 mg Oral Daily Bora Rosario MD      Artificial Tears  1 drop Both Eyes Q3H PRN Jordan C Holter, DO      atropine  1 drop Sublingual HS Harjeet TorresDO      haloperidol lactate  2.5 mg Intramuscular Q4H PRN Max 4/day HOLLI Lion      And    LORazepam  1 mg Intramuscular Q4H PRN Max 4/day STEVE LionNP      And    benztropine  0.5 mg Intramuscular Q4H PRN Max 4/day HOLLI Lion      benztropine  1 mg Intramuscular Q4H PRN Max 6/day Jordan C Holter, DO      haloperidol lactate  5 mg Intramuscular Q4H PRN Max 4/day HOLLI Lion      And    LORazepam  2 mg Intramuscular Q4H PRN Max 4/day HOLLI Lion      And    benztropine  1 mg Intramuscular Q4H PRN Max 4/day HOLLI Lion      benztropine  1 mg Oral Q4H PRN Max 6/day HOLLI Lion      benztropine  1 mg Oral Q4H PRN Max 6/day Jordan C Holter, DO      bisacodyl  10 mg Rectal Daily PRN HOLLI Lion      calcium carbonate  500 mg Oral BID PRN HOLLI Monge      cloZAPine  225 mg Oral HS Hardik So MD      hydrOXYzine HCL  50 mg Oral Q6H PRN Max 4/day Jordan C Holter, DO      Or    diphenhydrAMINE  50 mg Intramuscular Q6H PRN Jordan C Holter, DO      hydrOXYzine HCL  50 mg Oral Q6H PRN Max 4/day HOLLI Lion      Or    diphenhydrAMINE  50 mg Intramuscular Q6H PRN HOLLI Lion      diphenhydrAMINE-zinc acetate   Topical BID PRN HOLLI Lion      escitalopram  20 mg Oral Daily HOLLI Lion      famotidine  20 mg Oral BID PRN HOLLI Monge      fluticasone  1 spray Each Nare Daily PRN HOLLI Monge      glycopyrrolate  1 mg Oral BID (AM & Afternoon) Bora Rosario MD      glycopyrrolate  2 mg Oral HS Bora Rosario MD      haloperidol  1 mg Oral Q6H PRN HOLLI Lion      haloperidol  2.5 mg Oral Q4H PRN Max 4/day HOLLI Lion      haloperidol  5 mg Oral Q4H PRN Max 4/day  HOLLI Lion      hydroCHLOROthiazide  12.5 mg Oral Daily HOLLI Galvan      hydrocortisone   Topical 4x Daily PRN HOLLI Lion      hydrOXYzine HCL  100 mg Oral Q6H PRN Max 4/day [facility] Holter, DO      Or    LORazepam  2 mg Intramuscular Q6H PRN [facility] Holter, DO      hydrOXYzine HCL  100 mg Oral Q6H PRN Max 4/day HOLLI Lion      Or    LORazepam  2 mg Intramuscular Q6H PRN HOLLI Lion      hydrOXYzine HCL  25 mg Oral Q6H PRN Max 4/day [facility] Holter, DO      ibuprofen  600 mg Oral Q8H PRN HOLLI Lion      influenza vaccine  0.5 mL Intramuscular Once Bora Rosario MD      loratadine  10 mg Oral Daily Brina Guillen MD      melatonin  3 mg Oral HS PRN Bora Rosario MD      melatonin  9 mg Oral HS Bora Rosario MD      methocarbamol  500 mg Oral Q6H PRN HOLLI Lion      multivitamin-minerals  1 tablet Oral Daily HOLLI Monge      nicotine polacrilex  2 mg Oral Q4H PRN Bora Rosario MD      OLANZapine  5 mg Oral Q4H PRN Max 3/day [facility] Holter, DO      Or    OLANZapine  2.5 mg Intramuscular Q4H PRN Max 3/day [facility] Holter, DO      OLANZapine  5 mg Oral Q3H PRN Max 3/day [facility] Holter, DO      Or    OLANZapine  5 mg Intramuscular Q3H PRN Max 3/day [facility] Holter, DO      OLANZapine  2.5 mg Oral Q4H PRN Max 6/day [facility] Holter, DO      ondansetron  4 mg Oral Q6H PRN HOLLI Lion      p[facility]oprazole  [facility]      polyethylene glycol  17 g Oral Daily PRN HOLLI Ghotra      propranolol  10 mg Oral Q12H KAYLIE HOLLI Lion      senna-docusate sodium  1 tablet Oral Daily PRN [facility] Holter, DO      senna-docusate sodium  2 tablet Oral HS Melvina Ambron, DO      traZODone  50 mg Oral HS PRN HOLLI Lion      white petrolatum-mineral oil   Topical TID PRN HOLLI Lion         Risks, benefits and possible side effects of Medications:   Risks, benefits, and possible side effects of medications  "explained to patient and patient verbalizes understanding.      Dual Antipsychotic Rationale: Currently on dual antipsychotic therapy due to previous treatment inefficacy.      Mental Status Evaluation:  Appearance:  Patient is a heavy-set male, laying in bed and covered in blankets at the time of this interview. He appears tired, but in no acute distress.    Behavior:  Patient is laying in bed, but is calm and cooperative. No stereotyped behaviors are noted.    Speech:  normal rate, normal pitch, fluent, clear, coherent, soft, responses are short   Mood:  \"Okay\"   Affect:  Depressed, mood-congruent, stable throughout interview, appropriate to thought content    Thought Process:  organized, logical, coherent, goal directed, linear, normal rate of thoughts, normal abstract reasoning   Associations: intact associations   Thought Content:  Patient expresses symptoms consistent with persecutory delusions from the voices that threaten to harm him. He otherwise notes feeling safe on the unit. He denies any SI or HI.    Perceptual Disturbances: Patient endorses symptoms consistent with AH, but denies VH. He does not appear to be responding to internal stimuli at time of interview.    Risk Potential: Suicidal ideation - denies  Homicidal ideation - denies  Potential for aggression - Not at present   Sensorium:  oriented to person, place, and time/date   Memory:  recent and remote memory grossly intact   Consciousness:  alert and awake   Attention/Concentration: attention span and concentration are age appropriate   Insight:  Fair    Judgment: good   Gait/Station: unable to assess   Motor Activity: no abnormal movements     Recommended Treatment:   See above for assessment and plan.    Counseling/Coordination of Care    Total unit time spent today was greater than 15 minutes. Greater than 50% of total time was spent with the patient and/or patient's relatives and/or coordination of patient's care.     Patient's rights, " confidentiality, exceptions to confidentiality, use of electronic medical record including appropriate staff access to medical record regarding behavioral health services and consent to treatment were reviewed.    Note Share:     This note was not shared with the patient due to reasonable likelihood of causing patient harm     Yamila Lilly, PA-S

## 2024-10-30 NOTE — PLAN OF CARE
Problem: Alteration in Thoughts and Perception  Goal: Verbalize thoughts and feelings  Description: Interventions:  - Promote a nonjudgmental and trusting relationship with the patient through active listening and therapeutic communication  - Assess patient's level of functioning, behavior and potential for risk  - Engage patient in 1 on 1 interactions  - Encourage patient to express fears, feelings, frustrations, and discuss symptoms    - [place] patient to reality, help patient recognize reality-based thinking   - Administer medications as ordered and assess for potential side effects  - Provide the patient education related to the signs and symptoms of the illness and desired effects of prescribed medications  Outcome: Progressing  Goal: Refrain from acting on delusional thinking/internal stimuli  Description: Interventions:  - Monitor patient closely, per order   - Utilize least restrictive measures   - Set reasonable limits, give positive feedback for acceptable   - Administer medications as ordered and monitor of potential side effects  Outcome: Progressing  Goal: Agree to be compliant with medication regime, as prescribed and report medication side effects  Description: Interventions:  - Offer appropriate PRN medication and supervise ingestion; conduct AIMS, as needed   Outcome: Progressing  Goal: Attend and participate in unit activities, including therapeutic, recreational, and educational groups  Description: Interventions:  -Encourage Visitation and family involvement in care  Outcome: Progressing  Goal: Recognize dysfunctional thoughts, communicate reality-based thoughts at the time of discharge  Description: Interventions:  - Provide medication and psycho-education to assist patient in compliance and developing insight into his/her illness   Outcome: Progressing  Goal: Complete daily ADLs, including personal hygiene independently, as able  Description: Interventions:  - Observe, teach, and assist  patient with ADLS  - Monitor and promote a balance of rest/activity, with adequate nutrition and elimination   Outcome: Progressing     Problem: Ineffective Coping  Goal: Identifies ineffective coping skills  Outcome: Progressing  Goal: Identifies healthy coping skills  Outcome: Progressing  Goal: Demonstrates healthy coping skills  Outcome: Progressing  Goal: Participates in unit activities  Description: Interventions:  - Provide therapeutic environment   - Provide required programming   - Redirect inappropriate behaviors   Outcome: Progressing     Problem: Depression  Goal: Verbalize thoughts and feelings  Description: Interventions:  - Assess and re-assess patient's level of risk   - Engage patient in 1:1 interactions, daily, for a minimum of 15 minutes   - Encourage patient to express feelings, fears, frustrations, hopes   Outcome: Progressing  Goal: Refrain from harming self  Description: Interventions:  - Monitor patient closely, per order   - Supervise medication ingestion, monitor effects and side effects   Outcome: Progressing  Goal: Refrain from isolation  Description: Interventions:  - Develop a trusting relationship   - Encourage socialization   Outcome: Progressing  Goal: Attend and participate in unit activities, including therapeutic, recreational, and educational groups  Description: Interventions:  - Provide therapeutic and educational activities daily, encourage attendance and participation, and document same in the medical record   Outcome: Progressing  Goal: Complete daily ADLs, including personal hygiene independently, as able  Description: Interventions:  - Observe, teach, and assist patient with ADLS  -  Monitor and promote a balance of rest/activity, with adequate nutrition and elimination   Outcome: Progressing     Problem: Anxiety  Goal: Anxiety is at manageable level  Description: Interventions:  - Assess and monitor patient's anxiety level.   - Monitor for signs and symptoms (heart  palpitations, chest pain, shortness of breath, headaches, nausea, feeling jumpy, restlessness, irritable, apprehensive).   - Collaborate with interdisciplinary team and initiate plan and interventions as ordered.  - [place] patient to unit/surroundings  - Explain treatment plan  - Encourage participation in care  - Encourage verbalization of concerns/fears  - Identify coping mechanisms  - Assist in developing anxiety-reducing skills  - Administer/offer alternative therapies  - Limit or eliminate stimulants  Outcome: Progressing     Problem: DISCHARGE PLANNING - CARE MANAGEMENT  Goal: Discharge to post-acute care or home with appropriate resources  Description: INTERVENTIONS:  - Conduct assessment to determine patient/family and health care team treatment goals, and need for post-acute services based on payer coverage, community resources, and patient preferences, and barriers to discharge  - Address psychosocial, clinical, and financial barriers to discharge as identified in assessment in conjunction with the patient/family and health care team  - Arrange appropriate level of post-acute services according to patient's   needs and preference and payer coverage in collaboration with the physician and health care team  - Communicate with and update the patient/family, physician, and health care team regarding progress on the discharge plan  - Arrange appropriate transportation to post-acute venues  Outcome: Progressing     Problem: Alteration in Thoughts and Perception  Goal: Treatment Goal: Gain control of psychotic behaviors/thinking, reduce/eliminate presenting symptoms and demonstrate improved reality functioning upon discharge  Outcome: Not Progressing     Problem: Depression  Goal: Treatment Goal: Demonstrate behavioral control of depressive symptoms, verbalize feelings of improved mood/affect, and adopt new coping skills prior to discharge  Outcome: Not Progressing

## 2024-10-30 NOTE — PLAN OF CARE
Problem: Ineffective Coping  Goal: Identifies ineffective coping skills  Outcome: Not Progressing  Goal: Identifies healthy coping skills  Outcome: Progressing  Goal: Demonstrates healthy coping skills  Outcome: Progressing  Goal: Participates in unit activities  Description: Interventions:  - Provide therapeutic environment   - Provide required programming   - Redirect inappropriate behaviors   Outcome: Progressing

## 2024-10-31 PROCEDURE — 99232 SBSQ HOSP IP/OBS MODERATE 35: CPT | Performed by: PSYCHIATRY & NEUROLOGY

## 2024-10-31 RX ADMIN — CLOZAPINE 225 MG: 25 TABLET ORAL at 21:08

## 2024-10-31 RX ADMIN — NICOTINE POLACRILEX 2 MG: 2 GUM, CHEWING ORAL at 13:37

## 2024-10-31 RX ADMIN — ATROPINE SULFATE 1 DROP: 10 SOLUTION/ DROPS OPHTHALMIC at 21:08

## 2024-10-31 RX ADMIN — ACETAMINOPHEN 650 MG: 325 TABLET, FILM COATED ORAL at 21:49

## 2024-10-31 RX ADMIN — AMLODIPINE BESYLATE 5 MG: 5 TABLET ORAL at 08:05

## 2024-10-31 RX ADMIN — GLYCOPYRROLATE 2 MG: 1 TABLET ORAL at 21:08

## 2024-10-31 RX ADMIN — NICOTINE POLACRILEX 2 MG: 2 GUM, CHEWING ORAL at 21:08

## 2024-10-31 RX ADMIN — PROPRANOLOL HYDROCHLORIDE 10 MG: 10 TABLET ORAL at 08:06

## 2024-10-31 RX ADMIN — PANTOPRAZOLE SODIUM 20 MG: 20 TABLET, DELAYED RELEASE ORAL at 08:05

## 2024-10-31 RX ADMIN — GLYCOPYRROLATE 1 MG: 1 TABLET ORAL at 13:37

## 2024-10-31 RX ADMIN — LORATADINE 10 MG: 10 TABLET ORAL at 08:05

## 2024-10-31 RX ADMIN — GLYCOPYRROLATE 1 MG: 1 TABLET ORAL at 08:05

## 2024-10-31 RX ADMIN — NICOTINE POLACRILEX 2 MG: 2 GUM, CHEWING ORAL at 09:31

## 2024-10-31 RX ADMIN — ARIPIPRAZOLE 30 MG: 15 TABLET ORAL at 08:05

## 2024-10-31 RX ADMIN — SENNOSIDES AND DOCUSATE SODIUM 2 TABLET: 8.6; 5 TABLET ORAL at 21:08

## 2024-10-31 RX ADMIN — PROPRANOLOL HYDROCHLORIDE 10 MG: 10 TABLET ORAL at 21:08

## 2024-10-31 RX ADMIN — HYDROCHLOROTHIAZIDE 12.5 MG: 12.5 TABLET ORAL at 08:05

## 2024-10-31 RX ADMIN — MELATONIN TAB 3 MG 9 MG: 3 TAB at 21:08

## 2024-10-31 RX ADMIN — ESCITALOPRAM OXALATE 20 MG: 10 TABLET, FILM COATED ORAL at 08:05

## 2024-10-31 RX ADMIN — NICOTINE POLACRILEX 2 MG: 2 GUM, CHEWING ORAL at 17:48

## 2024-10-31 RX ADMIN — MULTIPLE VITAMINS W/ MINERALS TAB 1 TABLET: TAB ORAL at 08:05

## 2024-10-31 NOTE — ASSESSMENT & PLAN NOTE
Reviewed on 10/31/24  Follows with medical  Continue famotidine 20 mg tablet BID per medical team   Continue glycopyrrolate 1 mg tablet BID AM and afternoon per medical team  Continue glycopyrrolate 2 mg tablet before bed per medical team  Continue pantoprazole EC 20 mg tablet every morning per medical team  No associated orders from this encounter found during lookback period of 72 hours.

## 2024-10-31 NOTE — ASSESSMENT & PLAN NOTE
Reviewed on 10/31/24  Follows with medical  No associated orders from this encounter found during lookback period of 72 hours.

## 2024-10-31 NOTE — PLAN OF CARE
Problem: Alteration in Thoughts and Perception  Goal: Treatment Goal: Gain control of psychotic behaviors/thinking, reduce/eliminate presenting symptoms and demonstrate improved reality functioning upon discharge  Outcome: Progressing  Goal: Verbalize thoughts and feelings  Description: Interventions:  - Promote a nonjudgmental and trusting relationship with the patient through active listening and therapeutic communication  - Assess patient's level of functioning, behavior and potential for risk  - Engage patient in 1 on 1 interactions  - Encourage patient to express fears, feelings, frustrations, and discuss symptoms    - [place] patient to reality, help patient recognize reality-based thinking   - Administer medications as ordered and assess for potential side effects  - Provide the patient education related to the signs and symptoms of the illness and desired effects of prescribed medications  Outcome: Progressing  Goal: Agree to be compliant with medication regime, as prescribed and report medication side effects  Description: Interventions:  - Offer appropriate PRN medication and supervise ingestion; conduct AIMS, as needed   Outcome: Progressing  Goal: Complete daily ADLs, including personal hygiene independently, as able  Description: Interventions:  - Observe, teach, and assist patient with ADLS  - Monitor and promote a balance of rest/activity, with adequate nutrition and elimination   Outcome: Progressing     Problem: Ineffective Coping  Goal: Demonstrates healthy coping skills  Outcome: Progressing  Goal: Participates in unit activities  Description: Interventions:  - Provide therapeutic environment   - Provide required programming   - Redirect inappropriate behaviors   Outcome: Progressing     Problem: Depression  Goal: Refrain from isolation  Description: Interventions:  - Develop a trusting relationship   - Encourage socialization   Outcome: Progressing  Goal: Refrain from self-neglect  Outcome:  Progressing     Problem: Anxiety  Goal: Anxiety is at manageable level  Description: Interventions:  - Assess and monitor patient's anxiety level.   - Monitor for signs and symptoms (heart palpitations, chest pain, shortness of breath, headaches, nausea, feeling jumpy, restlessness, irritable, apprehensive).   - Collaborate with interdisciplinary team and initiate plan and interventions as ordered.  - [place] patient to unit/surroundings  - Explain treatment plan  - Encourage participation in care  - Encourage verbalization of concerns/fears  - Identify coping mechanisms  - Assist in developing anxiety-reducing skills  - Administer/offer alternative therapies  - Limit or eliminate stimulants  Outcome: Progressing

## 2024-10-31 NOTE — PROGRESS NOTES
10/31/24 0820   Team Meeting   Meeting Type Daily Rounds   Team Members Present   Team Members Present Physician;Nurse;;Other (Discipline and Name)   Physician Team Member Abraham Mora   Nursing Team Member Shai   Social Work Team Member Jose J ORTEGA   Other (Discipline and Name) Wang PCM   Patient/Family Present   Patient Present No   Patient's Family Present No     Groups Participation  9/11.   Expected D/C Date:12/19    Patient's compliant with medications. He's engaged in his treatment. He's appropriate. D/C home with ACT services funded by the [facility].

## 2024-10-31 NOTE — ASSESSMENT & PLAN NOTE
Reviewed on 10/31/24  Follows with medical  Continue senna-docusate sodium two tablets before bed per medical team  No associated orders from this encounter found during lookback period of 72 hours.

## 2024-10-31 NOTE — ASSESSMENT & PLAN NOTE
Reviewed on 10/31/24  Ongoing by medical  No associated orders from this encounter found during lookback period of 72 hours.

## 2024-10-31 NOTE — NURSING NOTE
Patient is pleasant and cooperative. He is social and interactive with peers and staff.  Laughing and smiling. He attended groups. Appetite is fair. Zero for breakfast. 100 %for lunch.

## 2024-10-31 NOTE — SOCIAL WORK
This writer met with patient for an individual session. The patient reports his recent family visit went well. However he has been unable to reach his aunt, with whom he plans to stay upon discharge. The patient inquired about the status of his discharge.  The patient is prepared for discharge but requires confirmation of services and funding to support a successful transition to the community.   This writer discussed the current status of his discharge, explaining that we are awaiting [facility] funding approval for ACT services. This writer emphasized the importance of securing appropriate community services to support his continued recovery and stability post-discharge. This writer agreed to contact the [facility] representative to reiterate the urgency of prioritizing the patient's case, given his readiness for discharge. This writer will continue to update the patient on any developments regarding discharge arrangements.

## 2024-10-31 NOTE — ASSESSMENT & PLAN NOTE
Reviewed on 10/31/24    Pt reports continued depression and anxiety, as well as symptoms consistent with AH. He notes voices that are threatening to kill him. He denies symptoms consistent with VH, and denies SI and HI at this time. Patient has been active in groups, and had improved appetite yesterday, eating 100% at lunch and dinner.      Continue Clozapine 225 mg qhs for psychosis -- to consider further careful titration for Sxs depending on response  Continue to obtain CBC with diff, CRP, and CK (weekly on Tues), and a Clozapine level and BNP (every 2 weeks on Tues)    Continue Abilify 30 mg qd for psychosis  Continue Escitalopram 20 mg qd for depression and anxiety  Continue Senna-Docusate sodium two 50 mg tablets daily before bed for constipation  Continue propanolol 10 mg bid for anxiety   Continue amlodipine 5 mg PO daily     Pt remains on dual antipsychotic Tx due to failure on monotherapy      ACT team referral was made  No associated orders from this encounter found during lookback period of 72 hours.

## 2024-10-31 NOTE — PROGRESS NOTES
Progress Note - Behavioral Health   Name: Alberto Berumen 29 y.o. male I MRN: [id number]  Unit/Bed#: [facility] 112-02 I Date of Admission: 3/29/2024   Date of Service: 10/31/2024 I Hospital Day: 216     Assessment & Plan  Schizoaffective disorder, bipolar type (HCC)  Reviewed on 10/31/24    Pt reports continued depression and anxiety, as well as symptoms consistent with AH. He notes voices that are threatening to kill him. He denies symptoms consistent with VH, and denies SI and HI at this time. Patient has been active in groups, and had improved appetite yesterday, eating 100% at lunch and dinner.      Continue Clozapine 225 mg qhs for psychosis -- to consider further careful titration for Sxs depending on response  Continue to obtain CBC with diff, CRP, and CK (weekly on Tues), and a Clozapine level and BNP (every 2 weeks on Tues)    Continue Abilify 30 mg qd for psychosis  Continue Escitalopram 20 mg qd for depression and anxiety  Continue Senna-Docusate sodium two 50 mg tablets daily before bed for constipation  Continue propanolol 10 mg bid for anxiety   Continue amlodipine 5 mg PO daily     Pt remains on dual antipsychotic Tx due to failure on monotherapy      ACT team referral was made  No associated orders from this encounter found during lookback period of 72 hours.    Chronic idiopathic constipation  Reviewed on 10/31/24  Follows with medical  Continue senna-docusate sodium two tablets before bed per medical team  No associated orders from this encounter found during lookback period of 72 hours.  GERD (gastroesophageal reflux disease)  Reviewed on 10/31/24  Follows with medical  Continue famotidine 20 mg tablet BID per medical team   Continue glycopyrrolate 1 mg tablet BID AM and afternoon per medical team  Continue glycopyrrolate 2 mg tablet before bed per medical team  Continue pantoprazole EC 20 mg tablet every morning per medical team  No associated orders from this encounter found during lookback period  "of 72 hours.  Medical clearance for psychiatric admission  Reviewed on 10/31/24  Ongoing by medical  No associated orders from this encounter found during lookback period of 72 hours.    Tobacco abuse  Reviewed on 10/31/24  Continue to encourage cessation upon discharge  No associated orders from this encounter found during lookback period of 72 hours.  Elevated hemoglobin A1c  Reviewed on 10/31/24  Follows with medical  No associated orders from this encounter found during lookback period of 72 hours.      [facility]  Psychiatric Progress Note - Department of Behavioral Health   Alberto Berumen 29 y.o. male MRN: [id number]  Unit/Bed#: [facility] 112-02 Encounter: [id number]      SUBJECTIVE     Patient evaluated this a.m. for continuity of care. Patient was discussed at length with nursing and treatment team. Per nursing, patient skipped breakfast yesterday but otherwise ate 100% of lunch and dinner. Patient continues to report symptoms consistent with AH, as well as minimal anxiety and depression. Nursing reports that patient slept all night, and he attended 9 out of 11 groups yesterday. Patient received no PRN medications last night.     During evaluation, no acute distress is noted. Alberto Berumen is feeling \"tired and depressed\" today, and provides short and groggy responses due to just waking up. He notes that he slept well last night. He continues to have unchanged symptoms of depression and anxiety, as well as symptoms consistent with AH. He comments that the messages of the voices are unchanged- they threaten to kill him. He otherwise denies symptoms consistent with VH, and denies SI and HI.      He skipped breakfast, but was preparing to get up for morning walk this morning. He was unable to work out yesterday due to floor repairs on the unit, but is hopeful that he will be able to today. He presents with no further concerns.     PSYCHIATRIC REVIEW OF SYSTEMS     Behavior over the " last 24 hours:  unchanged  Sleep: normal  Appetite: improved  Medication side effects: No    Progress Toward Goals: Patient Is progressing positively towards goals, as evidenced by his participation in individual, social and therapeutic milieu in addition to adherence to his medication regimen.  Patient Acceptance: Patient is accepting of and cooperative with his treatment regimen.   Patient Understanding: Patient acknowledges his symptoms, and is understanding of his need for treatment.   Patient Involvement in Reintegration Process: Patient is participatory in group activities, and is in contact with family members.   Patient's Therapeutic Relationship with Psychiatrist: Trusting  Patient's Optimism Regarding Recovery: Patient continues to adhere to treatment regimen and participatory in group activities, with goals of forward progress.   Group Attendance: Patient attended 9 out of 11 groups yesterday.     REVIEW OF SYSTEMS   Review of systems: no complaints    OBJECTIVE     Vital Signs in Past 24 Hours:  Temp:  [97.6 °F (36.4 °C)-97.7 °F (36.5 °C)] 97.7 °F (36.5 °C)  HR:  [] 89  BP: (119-133)/(64-75) 133/75  Resp:  [18] 18  SpO2:  [97 %-100 %] 100 %  O2 Device: None (Room air)    Intake/Output in Past 24 hours:  No intake/output data recorded.  No intake/output data recorded.        Laboratory Results:  I have personally reviewed all pertinent laboratory/tests results.  Labs in last 72 hours:   Recent Labs     10/29/24  0901   WBC 8.19   RBC 5.65*   HGB 13.5   HCT 42.6      RDW 14.3   NEUTROABS 3.80       Behavioral Health Medications: all current active meds have been reviewed.    Current Facility-Administered Medications   Medication Dose Route Frequency Provider Last Rate    acetaminophen  650 mg Oral Q4H PRN Jordan C Holter, DO      acetaminophen  650 mg Oral Q6H PRN HOLLI Lion      aluminum-magnesium hydroxide-simethicone  30 mL Oral Q4H PRN Jordan C Holter, DO      amLODIPine  5 mg Oral  Daily HOLLI Lion      ARIPiprazole  30 mg Oral Daily Bora Rosario MD      Artificial Tears  1 drop Both Eyes Q3H PRN Jordan C Holter, DO      atropine  1 drop Sublingual HS Harjeet Torres, DO      haloperidol lactate  2.5 mg Intramuscular Q4H PRN Max 4/day HOLLI Lion      And    LORazepam  1 mg Intramuscular Q4H PRN Max 4/day STEVE LionNP      And    benztropine  0.5 mg Intramuscular Q4H PRN Max 4/day HOLLI Lion      benztropine  1 mg Intramuscular Q4H PRN Max 6/day Jordan C Holter, DO      haloperidol lactate  5 mg Intramuscular Q4H PRN Max 4/day HOLLI Lion      And    LORazepam  2 mg Intramuscular Q4H PRN Max 4/day HOLLI Lion      And    benztropine  1 mg Intramuscular Q4H PRN Max 4/day HOLLI Lion      benztropine  1 mg Oral Q4H PRN Max 6/day HOLLI Lion      benztropine  1 mg Oral Q4H PRN Max 6/day Jordan C Holter, DO      bisacodyl  10 mg Rectal Daily PRN HOLLI Lion      calcium carbonate  500 mg Oral BID PRN HOLLI Monge      cloZAPine  225 mg Oral HS Hardik So MD      hydrOXYzine HCL  50 mg Oral Q6H PRN Max 4/day Jordan C Holter, DO      Or    diphenhydrAMINE  50 mg Intramuscular Q6H PRN Jordan C Holter, DO      hydrOXYzine HCL  50 mg Oral Q6H PRN Max 4/day HOLLI Lion      Or    diphenhydrAMINE  50 mg Intramuscular Q6H PRN HOLLI Lion      diphenhydrAMINE-zinc acetate   Topical BID PRN HOLLI Lion      escitalopram  20 mg Oral Daily HOLLI Lion      famotidine  20 mg Oral BID PRN HOLLI Monge      fluticasone  1 spray Each Nare Daily PRN HOLLI Monge      glycopyrrolate  1 mg Oral BID (AM & Afternoon) Bora Rosario MD      glycopyrrolate  2 mg Oral HS Bora Rosario MD      haloperidol  1 mg Oral Q6H PRN HOLLI Lion      haloperidol  2.5 mg Oral Q4H PRN Max 4/day HOLLI Lion      haloperidol  5 mg Oral Q4H PRN Max 4/day Eveline Hunt,  HOLLI      hydroCHLOROthiazide  12.5 mg Oral Daily HOLLI Galvan      hydrocortisone   Topical 4x Daily PRN HOLLI Lion      hydrOXYzine HCL  100 mg Oral Q6H PRN Max 4/day [facility] Holter, DO      Or    LORazepam  2 mg Intramuscular Q6H PRN [facility] Holter, DO      hydrOXYzine HCL  100 mg Oral Q6H PRN Max 4/day HOLLI Lion      Or    LORazepam  2 mg Intramuscular Q6H PRN HOLLI Lion      hydrOXYzine HCL  25 mg Oral Q6H PRN Max 4/day [facility] Holter, DO      ibuprofen  600 mg Oral Q8H PRN HOLLI Lion      influenza vaccine  0.5 mL Intramuscular Once Bora Rosario MD      loratadine  10 mg Oral Daily Brina Guillen MD      melatonin  3 mg Oral HS PRN Bora Rosario MD      melatonin  9 mg Oral HS Bora Rosario MD      methocarbamol  500 mg Oral Q6H PRN HOLLI Lion      multivitamin-minerals  1 tablet Oral Daily HOLLI Monge      nicotine polacrilex  2 mg Oral Q4H PRN Bora Rosario MD      OLANZapine  5 mg Oral Q4H PRN Max 3/day [facility] Holter, DO      Or    OLANZapine  2.5 mg Intramuscular Q4H PRN Max 3/day [facility] Holter, DO      OLANZapine  5 mg Oral Q3H PRN Max 3/day [facility] Holter, DO      Or    OLANZapine  5 mg Intramuscular Q3H PRN Max 3/day [facility] Holter, DO      OLANZapine  2.5 mg Oral Q4H PRN Max 6/day [facility] Holter, DO      ondansetron  4 mg Oral Q6H PRN HOLLI Lion      [facility]antoprazole  [facility]      polyethylene glycol  17 g Oral Daily PRN HOLLI Ghotra      propranolol  10 mg Oral Q12H KAYLIE HOLLI Lion      senna-docusate sodium  1 tablet Oral Daily PRN [facility] Holter, DO      senna-docusate sodium  2 tablet Oral HS Melvina Ambron, DO      traZODone  50 mg Oral HS PRN HOLLI Lion      white petrolatum-mineral oil   Topical TID PRN HOLLI Lion         Risks, benefits and possible side effects of Medications:   Risks, benefits, and possible side effects of medications explained to  "patient and patient verbalizes understanding.      Dual Antipsychotic Rationale: Currently on dual antipsychotic therapy due to previous treatment ineffectiveness.      Mental Status Evaluation:  Appearance:  Patient appears disheveled, tired, but in no acute distress. Patient is covered in blankets and laying in bed at time of interview.    Behavior:  Patient remains laying in bed, but is calm and cooperative. No stereotyped behavior is observed.    Speech:  Speech is of normal volume, but slow and short in responses.    Mood:  \"depressed\"   Affect:  Depressed, mood congruent, stable throughout interview, appropriate to thought content    Thought Process:  organized, logical, linear   Associations: intact associations   Thought Content:  Patient expresses symptoms of paranoia and persecutory delusions due to the voices he hears, but he denies delusions of grandiosity.    Perceptual Disturbances: Patient endorses symptoms consistent with AH, but denies those consistent with VH. He does not appear to be responding to internal stimuli at this time.    Risk Potential: Suicidal ideation - denies  Homicidal ideation - denies  Potential for aggression - Not at present   Sensorium:  oriented to person, place, and time/date   Memory:  recent and remote memory grossly intact   Consciousness:  awake, groggy   Attention/Concentration: attention span and concentration are age appropriate   Insight:  good   Judgment: age appropriate   Gait/Station: normal gait/station, normal balance   Motor Activity: no abnormal movements     Recommended Treatment:   See above for assessment and plan.    Counseling/Coordination of Care    Total unit time spent today was greater than 15 minutes. Greater than 50% of total time was spent with the patient and/or patient's relatives and/or coordination of patient's care.     Patient's rights, confidentiality, exceptions to confidentiality, use of electronic medical record including appropriate staff " access to medical record regarding behavioral health services and consent to treatment were reviewed.    Note Share:     This note was not shared with the patient due to reasonable likelihood of causing patient harm     Yamila Lopes   Psychiatry, PA-S

## 2024-10-31 NOTE — ASSESSMENT & PLAN NOTE
Reviewed on 10/31/24  Continue to encourage cessation upon discharge  No associated orders from this encounter found during lookback period of 72 hours.

## 2024-11-01 PROCEDURE — 99232 SBSQ HOSP IP/OBS MODERATE 35: CPT | Performed by: PSYCHIATRY & NEUROLOGY

## 2024-11-01 RX ADMIN — GLYCOPYRROLATE 1 MG: 1 TABLET ORAL at 08:31

## 2024-11-01 RX ADMIN — GLYCOPYRROLATE 2 MG: 1 TABLET ORAL at 21:05

## 2024-11-01 RX ADMIN — PANTOPRAZOLE SODIUM 20 MG: 20 TABLET, DELAYED RELEASE ORAL at 08:31

## 2024-11-01 RX ADMIN — NICOTINE POLACRILEX 2 MG: 2 GUM, CHEWING ORAL at 17:46

## 2024-11-01 RX ADMIN — ARIPIPRAZOLE 30 MG: 15 TABLET ORAL at 08:31

## 2024-11-01 RX ADMIN — ESCITALOPRAM OXALATE 20 MG: 10 TABLET, FILM COATED ORAL at 08:30

## 2024-11-01 RX ADMIN — NICOTINE POLACRILEX 2 MG: 2 GUM, CHEWING ORAL at 12:57

## 2024-11-01 RX ADMIN — HYDROCHLOROTHIAZIDE 12.5 MG: 12.5 TABLET ORAL at 08:30

## 2024-11-01 RX ADMIN — SENNOSIDES AND DOCUSATE SODIUM 2 TABLET: 8.6; 5 TABLET ORAL at 21:05

## 2024-11-01 RX ADMIN — MELATONIN TAB 3 MG 9 MG: 3 TAB at 21:05

## 2024-11-01 RX ADMIN — CLOZAPINE 225 MG: 25 TABLET ORAL at 21:05

## 2024-11-01 RX ADMIN — PROPRANOLOL HYDROCHLORIDE 10 MG: 10 TABLET ORAL at 08:30

## 2024-11-01 RX ADMIN — GLYCOPYRROLATE 1 MG: 1 TABLET ORAL at 14:17

## 2024-11-01 RX ADMIN — AMLODIPINE BESYLATE 5 MG: 5 TABLET ORAL at 08:30

## 2024-11-01 RX ADMIN — PROPRANOLOL HYDROCHLORIDE 10 MG: 10 TABLET ORAL at 21:05

## 2024-11-01 RX ADMIN — MULTIPLE VITAMINS W/ MINERALS TAB 1 TABLET: TAB ORAL at 08:30

## 2024-11-01 RX ADMIN — LORATADINE 10 MG: 10 TABLET ORAL at 08:30

## 2024-11-01 RX ADMIN — ATROPINE SULFATE 1 DROP: 10 SOLUTION/ DROPS OPHTHALMIC at 21:31

## 2024-11-01 NOTE — NURSING NOTE
Patient is visible and social with select peers. He is cooperative. He stated that he was tired today. His appetite is fair. Not eating breakfast to lose weight. Patient did not attend groups.

## 2024-11-01 NOTE — ASSESSMENT & PLAN NOTE
Reviewed on 11/1/24  Ongoing by medical  No associated orders from this encounter found during lookback period of 72 hours.

## 2024-11-01 NOTE — ASSESSMENT & PLAN NOTE
Reviewed on 11/1/24  Follows with medical  No associated orders from this encounter found during lookback period of 72 hours.

## 2024-11-01 NOTE — PLAN OF CARE
Problem: Alteration in Thoughts and Perception  Goal: Verbalize thoughts and feelings  Description: Interventions:  - Promote a nonjudgmental and trusting relationship with the patient through active listening and therapeutic communication  - Assess patient's level of functioning, behavior and potential for risk  - Engage patient in 1 on 1 interactions  - Encourage patient to express fears, feelings, frustrations, and discuss symptoms    - [place] patient to reality, help patient recognize reality-based thinking   - Administer medications as ordered and assess for potential side effects  - Provide the patient education related to the signs and symptoms of the illness and desired effects of prescribed medications  Outcome: Progressing  Goal: Refrain from acting on delusional thinking/internal stimuli  Description: Interventions:  - Monitor patient closely, per order   - Utilize least restrictive measures   - Set reasonable limits, give positive feedback for acceptable   - Administer medications as ordered and monitor of potential side effects  Outcome: Progressing  Goal: Agree to be compliant with medication regime, as prescribed and report medication side effects  Description: Interventions:  - Offer appropriate PRN medication and supervise ingestion; conduct AIMS, as needed   Outcome: Progressing  Goal: Complete daily ADLs, including personal hygiene independently, as able  Description: Interventions:  - Observe, teach, and assist patient with ADLS  - Monitor and promote a balance of rest/activity, with adequate nutrition and elimination   Outcome: Progressing     Problem: Ineffective Coping  Goal: Identifies ineffective coping skills  Outcome: Progressing  Goal: Participates in unit activities  Description: Interventions:  - Provide therapeutic environment   - Provide required programming   - Redirect inappropriate behaviors   Outcome: Progressing     Problem: Depression  Goal: Refrain from  isolation  Description: Interventions:  - Develop a trusting relationship   - Encourage socialization   Outcome: Progressing  Goal: Refrain from self-neglect  Outcome: Progressing     Problem: Anxiety  Goal: Anxiety is at manageable level  Description: Interventions:  - Assess and monitor patient's anxiety level.   - Monitor for signs and symptoms (heart palpitations, chest pain, shortness of breath, headaches, nausea, feeling jumpy, restlessness, irritable, apprehensive).   - Collaborate with interdisciplinary team and initiate plan and interventions as ordered.  - [place] patient to unit/surroundings  - Explain treatment plan  - Encourage participation in care  - Encourage verbalization of concerns/fears  - Identify coping mechanisms  - Assist in developing anxiety-reducing skills  - Administer/offer alternative therapies  - Limit or eliminate stimulants  Outcome: Progressing

## 2024-11-01 NOTE — ASSESSMENT & PLAN NOTE
Reviewed on 11/1/24    Pt reports continued depression and anxiety, as well as symptoms consistent with AH. He notes that the voices are threatening to kill him. He denies symptoms consistent with VH, and denies SI and HI at this time. Patient has been active in attending groups, and has been brighter and more social, per nursing.       Continue Clozapine 225 mg qhs for psychosis -- to consider further careful titration for Sxs depending on response  Continue to obtain CBC with diff, CRP, and CK (weekly on Tues), and a Clozapine level and BNP (every 2 weeks on Tues)    Continue Abilify 30 mg qd for psychosis  Continue Escitalopram 20 mg qd for depression and anxiety  Continue Senna-Docusate sodium two 50 mg tablets daily before bed for constipation  Continue propanolol 10 mg bid for anxiety   Continue amlodipine 5 mg PO daily     Pt remains on dual antipsychotic Tx due to failure on monotherapy      ACT team referral was made  No associated orders from this encounter found during lookback period of 72 hours.

## 2024-11-01 NOTE — ASSESSMENT & PLAN NOTE
Reviewed on 11/1/24  Follows with medical  Continue famotidine 20 mg tablet BID per medical team   Continue glycopyrrolate 1 mg tablet BID AM and afternoon per medical team  Continue glycopyrrolate 2 mg tablet before bed per medical team  Continue pantoprazole EC 20 mg tablet every morning per medical team  No associated orders from this encounter found during lookback period of 72 hours.

## 2024-11-01 NOTE — PROGRESS NOTES
Progress Note - Behavioral Health   Name: Alberto Berumen 29 y.o. male I MRN: [id number]  Unit/Bed#: [facility] 112-02 I Date of Admission: 3/29/2024   Date of Service: 11/1/2024 I Hospital Day: 217     Assessment & Plan  Schizoaffective disorder, bipolar type (HCC)  Reviewed on 11/1/24    Pt reports continued depression and anxiety, as well as symptoms consistent with AH. He notes that the voices are threatening to kill him. He denies symptoms consistent with VH, and denies SI and HI at this time. Patient has been active in attending groups, and has been brighter and more social, per nursing.       Continue Clozapine 225 mg qhs for psychosis -- to consider further careful titration for Sxs depending on response  Continue to obtain CBC with diff, CRP, and CK (weekly on Tues), and a Clozapine level and BNP (every 2 weeks on Tues)    Continue Abilify 30 mg qd for psychosis  Continue Escitalopram 20 mg qd for depression and anxiety  Continue Senna-Docusate sodium two 50 mg tablets daily before bed for constipation  Continue propanolol 10 mg bid for anxiety   Continue amlodipine 5 mg PO daily     Pt remains on dual antipsychotic Tx due to failure on monotherapy      ACT team referral was made  No associated orders from this encounter found during lookback period of 72 hours.    Chronic idiopathic constipation  Reviewed on 11/1/24  Follows with medical  Continue senna-docusate sodium two tablets before bed per medical team  No associated orders from this encounter found during lookback period of 72 hours.  GERD (gastroesophageal reflux disease)  Reviewed on 11/1/24  Follows with medical  Continue famotidine 20 mg tablet BID per medical team   Continue glycopyrrolate 1 mg tablet BID AM and afternoon per medical team  Continue glycopyrrolate 2 mg tablet before bed per medical team  Continue pantoprazole EC 20 mg tablet every morning per medical team  No associated orders from this encounter found during lookback period of 72  "hours.  Medical clearance for psychiatric admission  Reviewed on 11/1/24  Ongoing by medical  No associated orders from this encounter found during lookback period of 72 hours.    Tobacco abuse  Reviewed on 11/1/24  Continue to encourage cessation upon discharge  No associated orders from this encounter found during lookback period of 72 hours.  Elevated hemoglobin A1c  Reviewed on 11/1/24  Follows with medical  No associated orders from this encounter found during lookback period of 72 hours.      [facility]  Psychiatric Progress Note - Department of Behavioral Health   Alberto Berumen 29 y.o. male MRN: [id number]  Unit/Bed#: -02 Encounter: [id number]    SUBJECTIVE     Patient evaluated this a.m. for continuity of care. Patient was discussed at length with nursing and treatment team. Per nursing, patient has been increasingly social and in good spirits, although he continues to report symptoms of depression, anxiety, and AH. Patient received PRN Tylenol 650 mg yesterday evening at 21:49 for a headache, for which he denies any lingering pain this morning. Patient attended 7 out of 11 groups yesterday.      During evaluation, no acute distress is noted. Alberto Berumen notes that he is \"tired\" and remains laying in bed throughout interview, but is otherwise calm and cooperative. His responses are short but goal-directed. He notes that he has been more fatigued throughout the day than usual lately, even though he feels that he is getting good sleep. Nursing suggests the possibility of sleep apnea, noting that patient snores heavily throughout the night. Otherwise, patient notes persistent feelings of depression and anxiety, as well as symptoms consistent with AH with voices that threaten to kill him. He denies symptoms consistent with VH, or delusions of grandiose or referential nature. He denies SI and HI.     Patient attended the halloween celebrations yesterday \"for a bit\". He " "skipped breakfast this morning, but was preparing to get up for the morning walking group. He was once again not able to workout yesterday due to floor repairs on the unit, and is looking forward to when he can workout again.       PSYCHIATRIC REVIEW OF SYSTEMS     Behavior over the last 24 hours:  improved  Sleep: normal  Appetite: normal  Medication side effects: No    Progress Toward Goals: Patient is positively progressing towards goals, as evidenced by his participation in individual, social and therapeutic milieu in addition to adherence to his medication regimen.  Patient Acceptance: Patient is accepting of and cooperative with his treatment regimen.   Patient Understanding: Patient is understanding of the need for his treatment.   Patient Involvement in Reintegration Process: Patient is visible on unit, participatory in groups, and cooperative with regimen. He is in contact with his family.   Patient's Therapeutic Relationship with Psychiatrist: Trusting  Patient's Optimism Regarding Recovery: Patient continues to adhere to treatment regimen, and is on the waitlist for ACT services.   Group Attendance: Patient attended 7 out of 11 groups yesterday.     REVIEW OF SYSTEMS   Review of systems: sedation    OBJECTIVE     Vital Signs in Past 24 Hours:  Temp:  [97.5 °F (36.4 °C)-97.9 °F (36.6 °C)] 97.9 °F (36.6 °C)  HR:  [] 86  BP: (113-138)/(55-82) 138/78  Resp:  [18] 18  SpO2:  [96 %-98 %] 96 %  O2 Device: None (Room air)    Intake/Output in Past 24 hours:  No intake/output data recorded.  No intake/output data recorded.        Laboratory Results:  I have personally reviewed all pertinent laboratory/tests results.  Labs in last 72 hours: No results for input(s): \"WBC\", \"RBC\", \"HGB\", \"HCT\", \"PLT\", \"RDW\", \"TOTANEUTABS\", \"NEUTROABS\", \"SODIUM\", \"K\", \"CL\", \"CO2\", \"BUN\", \"CREATININE\", \"GLUC\", \"GLUF\", \"CALCIUM\", \"AST\", \"ALT\", \"ALKPHOS\", \"TP\", \"ALB\", \"TBILI\", \"CHOLESTEROL\", \"HDL\", \"TRIG\", \"LDLCALC\", \"VALPROICTOT\", " "\"CARBAMAZEPIN\", \"LITHIUM\", \"AMMONIA\", \"KPC2PTHSXUBB\", \"FREET4\", \"T3FREE\", \"PREGTESTUR\", \"PREGSERUM\", \"HCG\", \"HCGQUANT\", \"RPR\" in the last 72 hours.    Behavioral Health Medications: all current active meds have been reviewed.    Current Facility-Administered Medications   Medication Dose Route Frequency Provider Last Rate    acetaminophen  650 mg Oral Q4H PRN Jordan C Holter, DO      acetaminophen  650 mg Oral Q6H PRN HOLLI Lion      aluminum-magnesium hydroxide-simethicone  30 mL Oral Q4H PRN Jordan C Holter,       amLODIPine  5 mg Oral Daily HOLLI Lion      ARIPiprazole  30 mg Oral Daily Bora Rosario MD      Artificial Tears  1 drop Both Eyes Q3H PRN Jordan C Holter, DO      atropine  1 drop Sublingual HS Harjeet Torres DO      haloperidol lactate  2.5 mg Intramuscular Q4H PRN Max 4/day STEVE LionNP      And    LORazepam  1 mg Intramuscular Q4H PRN Max 4/day HOLLI Lion      And    benztropine  0.5 mg Intramuscular Q4H PRN Max 4/day HOLLI Lion      benztropine  1 mg Intramuscular Q4H PRN Max 6/day Jordan C Holter, DO      haloperidol lactate  5 mg Intramuscular Q4H PRN Max 4/day HOLLI Lion      And    LORazepam  2 mg Intramuscular Q4H PRN Max 4/day HOLLI Lion      And    benztropine  1 mg Intramuscular Q4H PRN Max 4/day HOLLI Lion      benztropine  1 mg Oral Q4H PRN Max 6/day HOLLI Lion      benztropine  1 mg Oral Q4H PRN Max 6/day Jordan C Holter, DO      bisacodyl  10 mg Rectal Daily PRN HOLLI Lion      calcium carbonate  500 mg Oral BID PRN HOLLI Monge      cloZAPine  225 mg Oral HS Hardik So MD      hydrOXYzine HCL  50 mg Oral Q6H PRN Max 4/day Jordan C Holter,       Or    diphenhydrAMINE  50 mg Intramuscular Q6H PRN Jordan C Holter, DO      hydrOXYzine HCL  50 mg Oral Q6H PRN Max 4/day HOLLI Lion      Or    diphenhydrAMINE  50 mg Intramuscular Q6H PRN HOLLI Lion      " diphenhydrAMINE-zinc acetate   Topical BID PRN HOLLI Lion      escitalopram  20 mg Oral Daily HOLLI Lion      famotidine  20 mg Oral BID PRN HOLLI Monge      fluticasone  1 spray Each Nare Daily PRN HOLLI Monge      glycopyrrolate  1 mg Oral BID (AM & Afternoon) Bora Rosario MD      glycopyrrolate  2 mg Oral HS Bora Rosario MD      haloperidol  1 mg Oral Q6H PRN HOLLI Lion      haloperidol  2.5 mg Oral Q4H PRN Max 4/day HOLLI Lion      haloperidol  5 mg Oral Q4H PRN Max 4/day HOLLI Lion      hydroCHLOROthiazide  12.5 mg Oral Daily Pia Mantilla, HOLLI      hydrocortisone   Topical 4x Daily PRN HOLLI Lion      hydrOXYzine HCL  100 mg Oral Q6H PRN Max 4/day Ion C Holter, DO      Or    LORazepam  2 mg Intramuscular Q6H PRN [facility] Holter, DO      hydrOXYzine HCL  100 mg Oral Q6H PRN Max 4/day HOLLI Lion      Or    LORazepam  2 mg Intramuscular Q6H PRN HOLLI Lion      hydrOXYzine HCL  25 mg Oral Q6H PRN Max 4/day Ion C Holter, DO      ibuprofen  600 mg Oral Q8H PRN HOLLI Lion      influenza vaccine  0.5 mL Intramuscular Once Bora Rosario MD      loratadine  10 mg Oral Daily Brina Guillen MD      melatonin  3 mg Oral HS PRN Bora Rosario MD      melatonin  9 mg Oral HS Bora Rosario MD      methocarbamol  500 mg Oral Q6H PRN HOLLI Lion      multivitamin-minerals  1 tablet Oral Daily HOLLI Monge      nicotine polacrilex  2 mg Oral Q4H PRN Bora Rosario MD      OLANZapine  5 mg Oral Q4H PRN Max 3/day Ion C Holter, DO      Or    OLANZapine  2.5 mg Intramuscular Q4H PRN Max 3/day Ion C Holter, DO      OLANZapine  5 mg Oral Q3H PRN Max 3/day Ion C Holter, DO      Or    OLANZapine  5 mg Intramuscular Q3H PRN Max 3/day Ion C Holter, DO      OLANZapine  2.5 mg Oral Q4H PRN Max 6/day Ion C Holter,       ondansetron  4 mg Oral Q6H PRN HOLLI Lion       "pantoprazole  20 mg Oral QAM Jordan C Holter, DO      polyethylene glycol  17 g Oral Daily PRN HOLLI Ghotra      propranolol  10 mg Oral Q12H KAYLIE HOLLI Lion      senna-docusate sodium  1 tablet Oral Daily PRN Jordan C Holter, DO      senna-docusate sodium  2 tablet Oral HS Melvina De Jesus, DO      traZODone  50 mg Oral HS PRN HOLLI Lion      white petrolatum-mineral oil   Topical TID PRN HOLLI Lion         Risks, benefits and possible side effects of Medications:   Risks, benefits, and possible side effects of medications explained to patient and patient verbalizes understanding.      Dual Antipsychotic Rationale: Due to previous treatment inefficacy.      Mental Status Evaluation:  Appearance:  Patient is laying in bed with body and face covered in blankets during interview this morning. No acute distress is noted.    Behavior:  Patient remains laying in bed, but is calm and cooperative. No stereotyped behaviors are noted.    Speech:  clear, slow, scant   Mood:  \"Tired\"   Affect:  Depressed, mood-congruent, constricted, stable throughout interview, appropriate to thought content   Thought Process:  goal directed, linear   Associations: intact associations   Thought Content:  Patient notes persecutory delusions in regards to voices that threaten to harm him, but denies symptoms consistent with grandiose or referential delusions. Patient denies SI and HI.    Perceptual Disturbances: Patient endorses symptoms consistent with AH, but denies VH. He does not appear to be responding to internal stimuli at time of interview.    Risk Potential: Suicidal ideation - denies  Homicidal ideation - denies  Potential for aggression - Not at present   Sensorium:  oriented to person, place, and time/date   Memory:  recent and remote memory grossly intact   Consciousness:  sedated   Attention/Concentration: attention span and concentration are age appropriate   Insight:  good   Judgment: good "   Gait/Station: unable to assess   Motor Activity: no abnormal movements     Recommended Treatment:   See above for assessment and plan.    Counseling/Coordination of Care    Total unit time spent today was greater than 15 minutes. Greater than 50% of total time was spent with the patient and/or patient's relatives and/or coordination of patient's care.     Patient's rights, confidentiality, exceptions to confidentiality, use of electronic medical record including appropriate staff access to medical record regarding behavioral health services and consent to treatment were reviewed.    Note Share:     This note was not shared with the patient due to reasonable likelihood of causing patient harm     Yamila Lopes   Psychiatry, PA-S

## 2024-11-01 NOTE — NURSING NOTE
Pt in bed asleep, observed eyes closed, and chest movement noted. 15 minute safety checks maintained. No unmet needs at this time. Will continue to monitor patient needs, sleep pattern, and behaviors.     0707: Patient has slept all night, 7+ hours of uninterrupted sleep

## 2024-11-01 NOTE — ASSESSMENT & PLAN NOTE
Reviewed on 11/1/24  Continue to encourage cessation upon discharge  No associated orders from this encounter found during lookback period of 72 hours.

## 2024-11-01 NOTE — ASSESSMENT & PLAN NOTE
Reviewed on 11/1/24  Follows with medical  Continue senna-docusate sodium two tablets before bed per medical team  No associated orders from this encounter found during lookback period of 72 hours.

## 2024-11-01 NOTE — NURSING NOTE
Pt is visible on the unit and social with select peers. Took medications without incidence. Pt is bright,  pleasant, and cooperative. Attended 7/11 groups. Reports anxiety and depression due to AH that threaten him and his family. Denies SI/HI/VH. No behavioral issues. Pt offers no complaints.

## 2024-11-02 PROCEDURE — 99232 SBSQ HOSP IP/OBS MODERATE 35: CPT | Performed by: NURSE PRACTITIONER

## 2024-11-02 RX ADMIN — CLOZAPINE 225 MG: 25 TABLET ORAL at 21:17

## 2024-11-02 RX ADMIN — NICOTINE POLACRILEX 2 MG: 2 GUM, CHEWING ORAL at 17:42

## 2024-11-02 RX ADMIN — NICOTINE POLACRILEX 2 MG: 2 GUM, CHEWING ORAL at 13:36

## 2024-11-02 RX ADMIN — GLYCOPYRROLATE 1 MG: 1 TABLET ORAL at 09:32

## 2024-11-02 RX ADMIN — NICOTINE POLACRILEX 2 MG: 2 GUM, CHEWING ORAL at 21:13

## 2024-11-02 RX ADMIN — GLYCOPYRROLATE 2 MG: 1 TABLET ORAL at 21:13

## 2024-11-02 RX ADMIN — ESCITALOPRAM OXALATE 20 MG: 10 TABLET, FILM COATED ORAL at 09:32

## 2024-11-02 RX ADMIN — ATROPINE SULFATE 1 DROP: 10 SOLUTION/ DROPS OPHTHALMIC at 21:13

## 2024-11-02 RX ADMIN — GLYCOPYRROLATE 1 MG: 1 TABLET ORAL at 13:33

## 2024-11-02 RX ADMIN — SENNOSIDES AND DOCUSATE SODIUM 2 TABLET: 8.6; 5 TABLET ORAL at 21:13

## 2024-11-02 RX ADMIN — MULTIPLE VITAMINS W/ MINERALS TAB 1 TABLET: TAB ORAL at 09:32

## 2024-11-02 RX ADMIN — NICOTINE POLACRILEX 2 MG: 2 GUM, CHEWING ORAL at 09:32

## 2024-11-02 RX ADMIN — MELATONIN TAB 3 MG 9 MG: 3 TAB at 21:13

## 2024-11-02 RX ADMIN — HALOPERIDOL 1 MG: 1 TABLET ORAL at 20:28

## 2024-11-02 RX ADMIN — AMLODIPINE BESYLATE 5 MG: 5 TABLET ORAL at 09:32

## 2024-11-02 RX ADMIN — PROPRANOLOL HYDROCHLORIDE 10 MG: 10 TABLET ORAL at 21:15

## 2024-11-02 RX ADMIN — LORATADINE 10 MG: 10 TABLET ORAL at 09:32

## 2024-11-02 RX ADMIN — ARIPIPRAZOLE 30 MG: 15 TABLET ORAL at 09:32

## 2024-11-02 RX ADMIN — PANTOPRAZOLE SODIUM 20 MG: 20 TABLET, DELAYED RELEASE ORAL at 09:32

## 2024-11-02 RX ADMIN — HYDROCHLOROTHIAZIDE 12.5 MG: 12.5 TABLET ORAL at 09:32

## 2024-11-02 RX ADMIN — PROPRANOLOL HYDROCHLORIDE 10 MG: 10 TABLET ORAL at 09:32

## 2024-11-02 NOTE — ASSESSMENT & PLAN NOTE
Reviewed on 11/2/24  Ongoing by medical  No associated orders from this encounter found during lookback period of 72 hours.

## 2024-11-02 NOTE — ASSESSMENT & PLAN NOTE
Reviewed on 11/2/24  Follows with medical  No associated orders from this encounter found during lookback period of 72 hours.

## 2024-11-02 NOTE — PROGRESS NOTES
This note was not shared with the patient due to reasonable likelihood of causing patient harm    Progress Note - Behavioral Health   Name: Alberto Bright y.o. male I MRN: [id number]  Unit/Bed#: [facility] 112-02 I Date of Admission: 3/29/2024   Date of Service: 11/2/2024 I Hospital Day: 218    Alberto Bright y.o. male MRN: [id number]   Unit/Bed#: [facility] 112-02 Encounter: [id number]        Assessment & Plan  Schizoaffective disorder, bipolar type (HCC)  Reviewed on 11/2/24    Reports continued depression and anxiety, as well as symptoms consistent with AH. He notes that the voices are threatening to kill him. He denies symptoms consistent with VH, and denies SI and HI at this time. Patient has been active in attending groups, and has been brighter and more social, per nursing.       Continue Clozapine 225 mg qhs for psychosis -- to consider further careful titration for Sxs depending on response  Continue to obtain CBC with diff, CRP, and CK (weekly on Tues), and a Clozapine level and BNP (every 2 weeks on Tues)    Continue Abilify 30 mg qd for psychosis  Continue Escitalopram 20 mg qd for depression and anxiety  Continue Senna-Docusate sodium two 50 mg tablets daily before bed for constipation  Continue propanolol 10 mg bid for anxiety   Continue amlodipine 5 mg PO daily     Pt remains on dual antipsychotic Tx due to failure on monotherapy      ACT team referral was made  No associated orders from this encounter found during lookback period of 72 hours.    Chronic idiopathic constipation  Reviewed on 11/2/24  Follows with medical  Continue senna-docusate sodium two tablets before bed per medical team  No associated orders from this encounter found during lookback period of 72 hours.  GERD (gastroesophageal reflux disease)  Reviewed on 11/2/24  Follows with medical  Continue famotidine 20 mg tablet BID per medical team   Continue glycopyrrolate 1 mg tablet BID AM and afternoon per medical team  Continue  glycopyrrolate 2 mg tablet before bed per medical team  Continue pantoprazole EC 20 mg tablet every morning per medical team  No associated orders from this encounter found during lookback period of 72 hours.  Medical clearance for psychiatric admission  Reviewed on 11/2/24  Ongoing by medical  No associated orders from this encounter found during lookback period of 72 hours.    Tobacco abuse  Reviewed on 11/2/24  Continue to encourage cessation upon discharge  No associated orders from this encounter found during lookback period of 72 hours.  Elevated hemoglobin A1c  Reviewed on 11/2/24  Follows with medical  No associated orders from this encounter found during lookback period of 72 hours.     Current medications:  Current Facility-Administered Medications   Medication Dose Route Frequency Provider Last Rate    acetaminophen  650 mg Oral Q4H PRN Jordan C Holter, DO      acetaminophen  650 mg Oral Q6H PRN HOLLI Lion      aluminum-magnesium hydroxide-simethicone  30 mL Oral Q4H PRN Jordan C Holter,       amLODIPine  5 mg Oral Daily HOLLI Lion      ARIPiprazole  30 mg Oral Daily Bora Rosario MD      Artificial Tears  1 drop Both Eyes Q3H PRN Jordan C Holter, DO      atropine  1 drop Sublingual HS Harjeet Torres,       haloperidol lactate  2.5 mg Intramuscular Q4H PRN Max 4/day HOLLI Lion      And    LORazepam  1 mg Intramuscular Q4H PRN Max 4/day HOLLI Lion      And    benztropine  0.5 mg Intramuscular Q4H PRN Max 4/day HOLLI Lion      benztropine  1 mg Intramuscular Q4H PRN Max 6/day Jordan C Holter,       haloperidol lactate  5 mg Intramuscular Q4H PRN Max 4/day HOLLI Lion      And    LORazepam  2 mg Intramuscular Q4H PRN Max 4/day HOLLI Lion      And    benztropine  1 mg Intramuscular Q4H PRN Max 4/day HOLLI Lion      benztropine  1 mg Oral Q4H PRN Max 6/day HOLLI Lion      benztropine  1 mg Oral Q4H PRN Max 6/day Jordan C Holter, DO       bisacodyl  10 mg Rectal Daily PRN HOLLI Lion      calcium carbonate  500 mg Oral BID PRN HLOLI Monge      cloZAPine  225 mg Oral HS Hardik So MD      hydrOXYzine HCL  50 mg Oral Q6H PRN Max 4/day Ion FISCHER Holter, DO      Or    diphenhydrAMINE  50 mg Intramuscular Q6H PRN Ion Dupontter, DO      hydrOXYzine HCL  50 mg Oral Q6H PRN Max 4/day HOLLI Lion      Or    diphenhydrAMINE  50 mg Intramuscular Q6H PRN HOLLI Lion      diphenhydrAMINE-zinc acetate   Topical BID PRN HOLLI Lion      escitalopram  20 mg Oral Daily HOLLI Lion      famotidine  20 mg Oral BID PRN HOLLI Monge      fluticasone  1 spray Each Nare Daily PRN HOLLI Monge      glycopyrrolate  1 mg Oral BID (AM & Afternoon) Bora Rosario MD      glycopyrrolate  2 mg Oral HS Bora Rosario MD      haloperidol  1 mg Oral Q6H PRN HOLLI Lion      haloperidol  2.5 mg Oral Q4H PRN Max 4/day HOLLI Lion      haloperidol  5 mg Oral Q4H PRN Max 4/day HOLLI Lion      hydroCHLOROthiazide  12.5 mg Oral Daily Pia Mantilla, HOLLI      hydrocortisone   Topical 4x Daily PRN HOLLI Lion      hydrOXYzine HCL  100 mg Oral Q6H PRN Max 4/day Jordan C Holter, DO      Or    LORazepam  2 mg Intramuscular Q6H PRN Ion Dupontter, DO      hydrOXYzine HCL  100 mg Oral Q6H PRN Max 4/day HOLLI Lion      Or    LORazepam  2 mg Intramuscular Q6H PRN HOLLI Lion      hydrOXYzine HCL  25 mg Oral Q6H PRN Max 4/day Ion FISCHER Holter, DO      ibuprofen  600 mg Oral Q8H PRN HOLLI Lion      influenza vaccine  0.5 mL Intramuscular Once Bora Rosario MD      loratadine  10 mg Oral Daily Brina Guillen MD      melatonin  3 mg Oral HS PRN Bora Rosario MD      melatonin  9 mg Oral HS Bora Rosario MD      methocarbamol  500 mg Oral Q6H PRN HOLLI Lion      multivitamin-minerals  1 tablet Oral Daily HOLLI Monge    "   nicotine polacrilex  2 mg Oral Q4H PRN Bora Rosario MD      OLANZapine  5 mg Oral Q4H PRN Max 3/day [facility] Holter, DO      Or    OLANZapine  2.5 mg Intramuscular Q4H PRN Max 3/day [facility] Holter, DO      OLANZapine  5 mg Oral Q3H PRN Max 3/day [facility] Holter, DO      Or    OLANZapine  5 mg Intramuscular Q3H PRN Max 3/day [facility] Holter, DO      OLANZapine  2.5 mg Oral Q4H PRN Max 6/day [facility] Holter, DO      ondansetron  4 mg Oral Q6H PRN HOLLI Lion      pantoprazole  20 mg Oral QAM [facility] Holter, DO      polyethylene glycol  17 g Oral Daily PRN HOLLI Ghotra      propranolol  10 mg Oral Q12H KAYLIE HOLLI Lion      senna-docusate sodium  1 tablet Oral Daily PRN [facility] Holter, DO      senna-docusate sodium  2 tablet Oral HS Melvina Ambron, DO      traZODone  50 mg Oral HS PRN HOLLI Lion      white petrolatum-mineral oil   Topical TID PRN HOLLI Lion         Risks / Benefits of Treatment:    Risks, benefits, and possible side effects of medications explained to patient and patient verbalizes understanding and agreement for treatment.    Patient was seen today for continuation of care, records reviewed and patient was discussed with the charge nurse.  No behavioral outbursts or acute events noted overnight and No significant changes in behaviors or clinical status over the last 24 hours.      Alberto was seen today while participating in our therapy.  Agrees to speak with writer outside of therapy room.  Reports that he feels \"a little depressed\" due to auditory hallucinations, chronic.  Reports that perceptual disturbances fluctuate in intensity throughout the day.  He adamantly denies any thoughts of self-harm or suicide.  He has been enjoying unit groups/programming.  Rates anxiety as 6/10 (10 being worst).  Does not appear to be responding to internal stimuli during time of encounter.  Denies side effects to psychotropic medications.    No medication changes " indicated at this time, continue current medication regimen.    Psychiatric Review of Systems:  Behavior over the last 24 hours: unchanged.   Sleep: sleeping okay throughout the night  Appetite: adequate  Medication side effects: none reported  ROS:no complaints    Mental Status Evaluation:    Appearance:  age appropriate, casually dressed, dressed appropriately, adequate grooming   Behavior:  cooperative, calm, interacts appropriately with this writer   Speech:  normal rate, normal volume, normal pitch   Mood:  depressed, anxious   Affect:  normal range and intensity, appropriate   Thought Process:  organized, logical, coherent, goal directed   Associations: intact associations   Thought Content:  no overt delusions, no paranoia noted on exam   Perceptual Disturbances: auditory hallucinations, chronic, no visual hallucinations, does not appear responding to internal stimuli   Risk Potential: Suicidal ideation - None  Homicidal ideation - None  Potential for aggression - No   Sensorium:  oriented to person, place, and time/date   Memory:  recent and remote memory grossly intact   Consciousness:  alert and awake   Attention/Concentration: attention span and concentration are age appropriate   Insight:  good   Judgment: age appropriate   Gait/Station: normal gait/station   Motor Activity: no abnormal movements     Vital signs in last 24 hours:    Temp:  [97.6 °F (36.4 °C)-97.8 °F (36.6 °C)] 97.8 °F (36.6 °C)  HR:  [81-92] 92  BP: (123-134)/(77-81) 134/78  Resp:  [18] 18  SpO2:  [96 %-97 %] 97 %  O2 Device: None (Room air)    Laboratory results: I have personally reviewed all pertinent laboratory/tests results    Results from the past 24 hours: No results found for this or any previous visit (from the past 24 hour(s)).    Discharge Disposition: Discharge disposition and planning remain ongoing    Counseling / Coordination of Care:    Total floor / unit time spent today 15 minutes. Greater than 50% of total time was  spent with the patient and / or family counseling and / or coordination of care. A description of counseling / coordination of care: diagnostic results, potential risks and benefits of management options, patient education, importance of adherence to management and/or risk factor reductions. Patient's rights, confidentiality, exceptions to confidentiality, use of electronic medical record including appropriate staff access to medical record regarding behavioral health services and consent to treatment were reviewed.    HOLLI Ghotra 11/02/24

## 2024-11-02 NOTE — ASSESSMENT & PLAN NOTE
Reviewed on 11/2/24  Follows with medical  Continue senna-docusate sodium two tablets before bed per medical team  No associated orders from this encounter found during lookback period of 72 hours.

## 2024-11-02 NOTE — ASSESSMENT & PLAN NOTE
Reviewed on 11/2/24  Continue to encourage cessation upon discharge  No associated orders from this encounter found during lookback period of 72 hours.

## 2024-11-02 NOTE — NURSING NOTE
Alert, cooperative and visible intermittently. Consumed 100% of dinner. Nicotine gum administered as ordered. Maintained on safe precautions without incident.

## 2024-11-02 NOTE — ASSESSMENT & PLAN NOTE
Reviewed on 11/2/24  Follows with medical  Continue famotidine 20 mg tablet BID per medical team   Continue glycopyrrolate 1 mg tablet BID AM and afternoon per medical team  Continue glycopyrrolate 2 mg tablet before bed per medical team  Continue pantoprazole EC 20 mg tablet every morning per medical team  No associated orders from this encounter found during lookback period of 72 hours.

## 2024-11-02 NOTE — ASSESSMENT & PLAN NOTE
Reviewed on 11/2/24    Reports continued depression and anxiety, as well as symptoms consistent with AH. He notes that the voices are threatening to kill him. He denies symptoms consistent with VH, and denies SI and HI at this time. Patient has been active in attending groups, and has been brighter and more social, per nursing.       Continue Clozapine 225 mg qhs for psychosis -- to consider further careful titration for Sxs depending on response  Continue to obtain CBC with diff, CRP, and CK (weekly on Tues), and a Clozapine level and BNP (every 2 weeks on Tues)    Continue Abilify 30 mg qd for psychosis  Continue Escitalopram 20 mg qd for depression and anxiety  Continue Senna-Docusate sodium two 50 mg tablets daily before bed for constipation  Continue propanolol 10 mg bid for anxiety   Continue amlodipine 5 mg PO daily     Pt remains on dual antipsychotic Tx due to failure on monotherapy      ACT team referral was made  No associated orders from this encounter found during lookback period of 72 hours.

## 2024-11-02 NOTE — NURSING NOTE
Patient visible and cooperative with staff this evening. Social with select peers. Comes to staff to have his needs met. Behaviors controlled and appropriate. Offered no complaints. Medication compliant as well. No prn medication requested or required.

## 2024-11-02 NOTE — NURSING NOTE
Alert, cooperative and visible intermittently. No SI or HI noted. Pt states he has a little  depression, anxiety and auditory hallucination. Pt did not elaborate. Routine dose of lexapro administered as ordered. Denies pain. Attended art therapy, coping skills, and nursing education. Refused breakfast and 50% of lunch. Took all medication without prompting. Maintained on safe precautions without incident. Will continue to monitor progress and recovery program.

## 2024-11-02 NOTE — PLAN OF CARE
Problem: Alteration in Thoughts and Perception  Goal: Agree to be compliant with medication regime, as prescribed and report medication side effects  Description: Interventions:  - Offer appropriate PRN medication and supervise ingestion; conduct AIMS, as needed   Outcome: Progressing     Problem: Ineffective Coping  Goal: Participates in unit activities  Description: Interventions:  - Provide therapeutic environment   - Provide required programming   - Redirect inappropriate behaviors   Outcome: Progressing     Problem: Depression  Goal: Verbalize thoughts and feelings  Description: Interventions:  - Assess and re-assess patient's level of risk   - Engage patient in 1:1 interactions, daily, for a minimum of 15 minutes   - Encourage patient to express feelings, fears, frustrations, hopes   Outcome: Progressing  Goal: Refrain from harming self  Description: Interventions:  - Monitor patient closely, per order   - Supervise medication ingestion, monitor effects and side effects   Outcome: Progressing  Goal: Refrain from isolation  Description: Interventions:  - Develop a trusting relationship   - Encourage socialization   Outcome: Progressing  Goal: Refrain from self-neglect  Outcome: Progressing  Goal: Attend and participate in unit activities, including therapeutic, recreational, and educational groups  Description: Interventions:  - Provide therapeutic and educational activities daily, encourage attendance and participation, and document same in the medical record   Outcome: Progressing     Problem: Alteration in Orientation  Goal: Treatment Goal: Demonstrate a reduction of confusion and improved orientation to person, place, time and/or situation upon discharge, according to optimum baseline/ability  Outcome: Progressing

## 2024-11-03 PROCEDURE — 99232 SBSQ HOSP IP/OBS MODERATE 35: CPT | Performed by: NURSE PRACTITIONER

## 2024-11-03 RX ORDER — AMOXICILLIN 250 MG
1 CAPSULE ORAL
Status: DISCONTINUED | OUTPATIENT
Start: 2024-11-03 | End: 2024-11-10

## 2024-11-03 RX ADMIN — NICOTINE POLACRILEX 2 MG: 2 GUM, CHEWING ORAL at 17:17

## 2024-11-03 RX ADMIN — PANTOPRAZOLE SODIUM 20 MG: 20 TABLET, DELAYED RELEASE ORAL at 08:36

## 2024-11-03 RX ADMIN — NICOTINE POLACRILEX 2 MG: 2 GUM, CHEWING ORAL at 21:14

## 2024-11-03 RX ADMIN — GLYCOPYRROLATE 2 MG: 1 TABLET ORAL at 21:12

## 2024-11-03 RX ADMIN — GLYCOPYRROLATE 1 MG: 1 TABLET ORAL at 15:00

## 2024-11-03 RX ADMIN — ARIPIPRAZOLE 30 MG: 15 TABLET ORAL at 08:35

## 2024-11-03 RX ADMIN — AMLODIPINE BESYLATE 5 MG: 5 TABLET ORAL at 08:36

## 2024-11-03 RX ADMIN — NICOTINE POLACRILEX 2 MG: 2 GUM, CHEWING ORAL at 08:35

## 2024-11-03 RX ADMIN — PROPRANOLOL HYDROCHLORIDE 10 MG: 10 TABLET ORAL at 08:36

## 2024-11-03 RX ADMIN — PROPRANOLOL HYDROCHLORIDE 10 MG: 10 TABLET ORAL at 21:34

## 2024-11-03 RX ADMIN — ESCITALOPRAM OXALATE 20 MG: 10 TABLET, FILM COATED ORAL at 08:35

## 2024-11-03 RX ADMIN — GLYCOPYRROLATE 1 MG: 1 TABLET ORAL at 08:36

## 2024-11-03 RX ADMIN — MELATONIN TAB 3 MG 9 MG: 3 TAB at 21:12

## 2024-11-03 RX ADMIN — ATROPINE SULFATE 1 DROP: 10 SOLUTION/ DROPS OPHTHALMIC at 21:17

## 2024-11-03 RX ADMIN — CLOZAPINE 225 MG: 25 TABLET ORAL at 21:13

## 2024-11-03 RX ADMIN — HYDROCHLOROTHIAZIDE 12.5 MG: 12.5 TABLET ORAL at 08:35

## 2024-11-03 RX ADMIN — LORATADINE 10 MG: 10 TABLET ORAL at 08:36

## 2024-11-03 RX ADMIN — MULTIPLE VITAMINS W/ MINERALS TAB 1 TABLET: TAB ORAL at 08:36

## 2024-11-03 RX ADMIN — SENNOSIDES AND DOCUSATE SODIUM 1 TABLET: 50; 8.6 TABLET ORAL at 21:13

## 2024-11-03 NOTE — NURSING NOTE
Haldol 1 mg given an hour ago was minimally effective. Patient had no further complaints of increased agitation but did continue to report voices. He appeared less preoccupied. Patient was seen sitting in the dining room watching television eating his hs snack.`

## 2024-11-03 NOTE — ASSESSMENT & PLAN NOTE
Reviewed on 11/3/24  Follows with medical  Continue famotidine 20 mg tablet BID per medical team   Continue glycopyrrolate 1 mg tablet BID AM and afternoon per medical team  Continue glycopyrrolate 2 mg tablet before bed per medical team  Continue pantoprazole EC 20 mg tablet every morning per medical team  No associated orders from this encounter found during lookback period of 72 hours.

## 2024-11-03 NOTE — ASSESSMENT & PLAN NOTE
Reviewed on 11/3/24  Ongoing by medical  No associated orders from this encounter found during lookback period of 72 hours.

## 2024-11-03 NOTE — ASSESSMENT & PLAN NOTE
Reviewed on 11/3/24  Follows with medical  Continue senna-docusate sodium two tablets before bed per medical team  No associated orders from this encounter found during lookback period of 72 hours.

## 2024-11-03 NOTE — NURSING NOTE
Weekly wellness assessment completed. Pt lung sounds clear in all lung fields. No edema noted to b/l lower ext. Bowel sounds +x4. B/l pedal pulses papable. Skin intact except b/l feet are dry and toenails are thick and pt states they are to hard to cut with the toenail clippers and requesting podiatry. Skin warm and color within normal limits for patient. Pt

## 2024-11-03 NOTE — NURSING NOTE
Patient reported he is becoming mildly agitated due to voices. Patient requested medication and received Haldol 1 mg po prn at this time. Patient holding his head as he sits quietly in the dining room.

## 2024-11-03 NOTE — NURSING NOTE
Pt slept in this am and refused breakfast. Pt is currently alert, cooperative and visible intermittently. No SI or HI noted. Continues with auditory hallucinations. Pt continues to c/o of depression, and anxiety. Denies pain. Attended coping skills. Refused breakfast and consumed 100% of lunch. Took all medication without prompting. Nicotine gum administered as ordered. Maintained on safe precautions without incident. Will continue to monitor progress and recovery program.

## 2024-11-03 NOTE — PROGRESS NOTES
This note was not shared with the patient due to reasonable likelihood of causing patient harm    Progress Note - Behavioral Health   Name: Alberto Bright y.o. male I MRN: [id number]  Unit/Bed#: [facility] 112-02 I Date of Admission: 3/29/2024   Date of Service: 11/3/2024 I Hospital Day: 219    Alberto Bright y.o. male MRN: [id number]   Unit/Bed#: [facility] 112-02 Encounter: [id number]        Assessment & Plan  Schizoaffective disorder, bipolar type (HCC)  Reviewed on 11/3/24    Reports continued depression and anxiety, as well as symptoms consistent with AH, per nursing staff.  Declines participation in interview today due to sleeping.  Requested as needed Haldol 1 mg p.o. last evening due to increased auditory hallucinations and agitation.  Though as needed was ineffective, at bedtime medications alleviated symptoms      Continue Clozapine 225 mg qhs for psychosis -- to consider further careful titration for Sxs depending on response  Continue to obtain CBC with diff, CRP, and CK (weekly on Tues), and a Clozapine level and BNP (every 2 weeks on Tues)    Continue Abilify 30 mg qd for psychosis  Continue Escitalopram 20 mg qd for depression and anxiety  Continue Senna-Docusate sodium two 50 mg tablets daily before bed for constipation  Continue propanolol 10 mg bid for anxiety   Continue amlodipine 5 mg PO daily     Pt remains on dual antipsychotic Tx due to failure on monotherapy      ACT team referral was made  No associated orders from this encounter found during lookback period of 72 hours.    Chronic idiopathic constipation  Reviewed on 11/3/24  Follows with medical  Continue senna-docusate sodium two tablets before bed per medical team  No associated orders from this encounter found during lookback period of 72 hours.  GERD (gastroesophageal reflux disease)  Reviewed on 11/3/24  Follows with medical  Continue famotidine 20 mg tablet BID per medical team   Continue glycopyrrolate 1 mg tablet BID AM and afternoon  per medical team  Continue glycopyrrolate 2 mg tablet before bed per medical team  Continue pantoprazole EC 20 mg tablet every morning per medical team  No associated orders from this encounter found during lookback period of 72 hours.  Medical clearance for psychiatric admission  Reviewed on 11/3/24  Ongoing by medical  No associated orders from this encounter found during lookback period of 72 hours.    Tobacco abuse  Reviewed on 11/3/24  Continue to encourage cessation upon discharge  No associated orders from this encounter found during lookback period of 72 hours.  Elevated hemoglobin A1c  Reviewed on 11/3/24  Follows with medical  No associated orders from this encounter found during lookback period of 72 hours.     Current medications:  Current Facility-Administered Medications   Medication Dose Route Frequency Provider Last Rate    acetaminophen  650 mg Oral Q4H PRN Jordan C Holter, DO      acetaminophen  650 mg Oral Q6H PRN HOLLI Lion      aluminum-magnesium hydroxide-simethicone  30 mL Oral Q4H PRN Jordan C Holter,       amLODIPine  5 mg Oral Daily HOLLI Lion      ARIPiprazole  30 mg Oral Daily Bora Rosario MD      Artificial Tears  1 drop Both Eyes Q3H PRN Jordan C Holter,       atropine  1 drop Sublingual HS Harjeet Torres,       haloperidol lactate  2.5 mg Intramuscular Q4H PRN Max 4/day HOLLI Lion      And    LORazepam  1 mg Intramuscular Q4H PRN Max 4/day HOLLI Lion      And    benztropine  0.5 mg Intramuscular Q4H PRN Max 4/day HOLLI Lion      benztropine  1 mg Intramuscular Q4H PRN Max 6/day Jordan C Holter, DO      haloperidol lactate  5 mg Intramuscular Q4H PRN Max 4/day HOLLI Lion      And    LORazepam  2 mg Intramuscular Q4H PRN Max 4/day HOLLI Lion      And    benztropine  1 mg Intramuscular Q4H PRN Max 4/day HOLLI Lion      benztropine  1 mg Oral Q4H PRN Max 6/day HOLLI Lion      benztropine  1 mg Oral Q4H PRN Max  6/day Ion FISCHER Holter, DO      bisacodyl  10 mg Rectal Daily PRN HOLLI Loin      calcium carbonate  500 mg Oral BID PRN HOLLI Monge      cloZAPine  225 mg Oral HS Hardik So MD      hydrOXYzine HCL  50 mg Oral Q6H PRN Max 4/day Ion FISCHER Holter, DO      Or    diphenhydrAMINE  50 mg Intramuscular Q6H PRN Ion FISCHER Holter, DO      hydrOXYzine HCL  50 mg Oral Q6H PRN Max 4/day HOLLI Lion      Or    diphenhydrAMINE  50 mg Intramuscular Q6H PRN HOLLI Lion      diphenhydrAMINE-zinc acetate   Topical BID PRN HOLLI Lion      escitalopram  20 mg Oral Daily HOLLI Lion      famotidine  20 mg Oral BID PRN HOLLI Monge      fluticasone  1 spray Each Nare Daily PRN HOLLI Monge      glycopyrrolate  1 mg Oral BID (AM & Afternoon) Bora Rosario MD      glycopyrrolate  2 mg Oral HS Bora Rosario MD      haloperidol  1 mg Oral Q6H PRN HOLLI Lion      haloperidol  2.5 mg Oral Q4H PRN Max 4/day HOLLI Lion      haloperidol  5 mg Oral Q4H PRN Max 4/day HOLLI Lion      hydroCHLOROthiazide  12.5 mg Oral Daily HOLLI Galvan      hydrocortisone   Topical 4x Daily PRN HOLLI Lion      hydrOXYzine HCL  100 mg Oral Q6H PRN Max 4/day Ion Dupontter, DO      Or    LORazepam  2 mg Intramuscular Q6H PRN Ion FISCHER Holter, DO      hydrOXYzine HCL  100 mg Oral Q6H PRN Max 4/day HOLLI Lion      Or    LORazepam  2 mg Intramuscular Q6H PRN HOLLI Lion      hydrOXYzine HCL  25 mg Oral Q6H PRN Max 4/day Ion FISCHER Holter, DO      ibuprofen  600 mg Oral Q8H PRN HOLLI Lion      influenza vaccine  0.5 mL Intramuscular Once Bora Rosario MD      loratadine  10 mg Oral Daily Brina Guillen MD      melatonin  3 mg Oral HS PRN Bora Rosario MD      melatonin  9 mg Oral HS Bora Rosario MD      methocarbamol  500 mg Oral Q6H PRN HOLLI Lion      multivitamin-minerals  1 tablet Oral Daily  HOLLI Monge      nicotine polacrilex  2 mg Oral Q4H PRN Bora Rosario MD      OLANZapine  5 mg Oral Q4H PRN Max 3/day [facility] Holter, DO      Or    OLANZapine  2.5 mg Intramuscular Q4H PRN Max 3/day [facility] Holter, DO      OLANZapine  5 mg Oral Q3H PRN Max 3/day [facility] Holter, DO      Or    OLANZapine  5 mg Intramuscular Q3H PRN Max 3/day [facility] Holter, DO      OLANZapine  2.5 mg Oral Q4H PRN Max 6/day [facility] Holter, DO      ondansetron  4 mg Oral Q6H PRN HOLLI Lion      pantoprazole  20 mg Oral QAM [facility] Holter, DO      polyethylene glycol  17 g Oral Daily PRN HOLLI Ghotra      propranolol  10 mg Oral Q12H KAYLIE HOLLI Lion      senna-docusate sodium  1 tablet Oral Daily PRN [facility] Holter, DO      senna-docusate sodium  2 tablet Oral HS Melvina Ambron, DO      traZODone  50 mg Oral HS PRN HOLLI Lion      white petrolatum-mineral oil   Topical TID PRN HOLLI Lion         Risks / Benefits of Treatment:    Risks, benefits, and possible side effects of medications explained to patient and patient verbalizes understanding and agreement for treatment.    Patient was seen today for continuation of care, records reviewed and patient was discussed with the charge nurse.  No behavioral outbursts or acute events noted overnight; though patient did request Haldol 1 mg p.o. due to an increase of auditory hallucinations and feelings of agitation.  As needed medication was ineffective, however at bedtime medications given shortly thereafter which did provide alleviation of symptoms.      Alberto declined participation in evaluation today due to sleeping.  Per staff, patient continues to endorse auditory hallucinations, anxiety, and depression, but does note that symptoms appear to be improving.  No evidence to suggest any acute lethality concerns as patient denies suicidal ideation, thoughts of self-harm, or thoughts to harm others.    No medication changes  indicated at this time, continue current medication regimen.    Psychiatric Review of Systems:  Behavior over the last 24 hours: unchanged.   Sleep: sleeping okay throughout the night  Appetite: adequate  Medication side effects: none reported  ROS:no complaints    Mental Status Evaluation:    Appearance:  disheveled   Behavior:  Sleeping, declines participation   Speech:  does not want to talk   Mood:  Unable to assess   Affect:  Unable to assess   Thought Process:  unable to assess   Associations: unable to assess - does not answer   Thought Content:  Unable to assess   Perceptual Disturbances: auditory hallucinations, chronic-reported from staff.   Risk Potential: Suicidal ideation - unable to assess  Homicidal ideation - unable to assess  Potential for aggression - Not at present   Sensorium:  does not answer   Memory:  patient does not answer   Consciousness:  Sleeping   Attention/Concentration: attention span and concentration: unable to assess due to lack of cooperation   Insight:  Unable to assess , though appropriately requests as needed medication   Judgment: Unable to assess   Gait/Station: unable to assess   Motor Activity: no abnormal movements     Vital signs in last 24 hours:    Temp:  [97.8 °F (36.6 °C)-98 °F (36.7 °C)] 98 °F (36.7 °C)  HR:  [87-93] 87  BP: (117-134)/(72-78) 122/78  Resp:  [17-18] 17  SpO2:  [97 %-99 %] 99 %  O2 Device: None (Room air)    Laboratory results: I have personally reviewed all pertinent laboratory/tests results    Results from the past 24 hours: No results found for this or any previous visit (from the past 24 hour(s)).    Discharge Disposition: Discharge disposition and planning remain ongoing    Counseling / Coordination of Care:    Patient's progress discussed with staff in treatment team meeting.   HOLLI Ghotra 11/03/24

## 2024-11-03 NOTE — ASSESSMENT & PLAN NOTE
Reviewed on 11/3/24  Follows with medical  No associated orders from this encounter found during lookback period of 72 hours.

## 2024-11-03 NOTE — ASSESSMENT & PLAN NOTE
Reviewed on 11/3/24  Continue to encourage cessation upon discharge  No associated orders from this encounter found during lookback period of 72 hours.

## 2024-11-03 NOTE — ASSESSMENT & PLAN NOTE
Reviewed on 11/3/24    Reports continued depression and anxiety, as well as symptoms consistent with AH, per nursing staff.  Declines participation in interview today due to sleeping.  Requested as needed Haldol 1 mg p.o. last evening due to increased auditory hallucinations and agitation.  Though as needed was ineffective, at bedtime medications alleviated symptoms      Continue Clozapine 225 mg qhs for psychosis -- to consider further careful titration for Sxs depending on response  Continue to obtain CBC with diff, CRP, and CK (weekly on Tues), and a Clozapine level and BNP (every 2 weeks on Tues)    Continue Abilify 30 mg qd for psychosis  Continue Escitalopram 20 mg qd for depression and anxiety  Continue Senna-Docusate sodium two 50 mg tablets daily before bed for constipation  Continue propanolol 10 mg bid for anxiety   Continue amlodipine 5 mg PO daily     Pt remains on dual antipsychotic Tx due to failure on monotherapy      ACT team referral was made  No associated orders from this encounter found during lookback period of 72 hours.

## 2024-11-03 NOTE — PLAN OF CARE
Problem: Alteration in Thoughts and Perception  Goal: Agree to be compliant with medication regime, as prescribed and report medication side effects  Description: Interventions:  - Offer appropriate PRN medication and supervise ingestion; conduct AIMS, as needed   Outcome: Progressing     Problem: Anxiety  Goal: Anxiety is at manageable level  Description: Interventions:  - Assess and monitor patient's anxiety level.   - Monitor for signs and symptoms (heart palpitations, chest pain, shortness of breath, headaches, nausea, feeling jumpy, restlessness, irritable, apprehensive).   - Collaborate with interdisciplinary team and initiate plan and interventions as ordered.  - Morrison patient to unit/surroundings  - Explain treatment plan  - Encourage participation in care  - Encourage verbalization of concerns/fears  - Identify coping mechanisms  - Assist in developing anxiety-reducing skills  - Administer/offer alternative therapies  - Limit or eliminate stimulants  Outcome: Progressing     Problem: Alteration in Orientation  Goal: Treatment Goal: Demonstrate a reduction of confusion and improved orientation to person, place, time and/or situation upon discharge, according to optimum baseline/ability  Outcome: Progressing  Goal: Interact with staff daily  Description: Interventions:  - Assess and re-assess patient's level of orientation  - Engage patient in 1 on 1 interactions, daily, for a minimum of 15 minutes   - Establish rapport/trust with patient   Outcome: Progressing  Goal: Express concerns related to confused thinking related to:  Description: Interventions:  - Encourage patient to express feelings, fears, frustrations, hopes  - Assign consistent caregivers   - Morrison/re-orient patient as needed  - Allow comfort items, as appropriate  - Provide visual cues, signs, etc.   Outcome: Progressing  Goal: Allow medical examinations, as recommended  Description: Interventions:  - Provide physical/neurological exams  and/or referrals, per provider   Outcome: Progressing  Goal: Cooperate with recommended testing/procedures  Description: Interventions:  - Determine need for ancillary testing  - Observe for mental status changes  - Implement falls/precaution protocol   Outcome: Progressing  Goal: Attend and participate in unit activities, including therapeutic, recreational, and educational groups  Description: Interventions:  - Provide therapeutic and educational activities daily, encourage attendance and participation, and document same in the medical record   - Provide appropriate opportunities for reminiscence   - Provide a consistent daily routine   - Encourage family contact/visitation   Outcome: Progressing  Goal: Complete daily ADLs, including personal hygiene independently, as able  Description: Interventions:  - Observe, teach, and assist patient with ADLS  Outcome: Progressing     Problem: Alteration in Thoughts and Perception  Goal: Treatment Goal: Gain control of psychotic behaviors/thinking, reduce/eliminate presenting symptoms and demonstrate improved reality functioning upon discharge  Outcome: Not Progressing  Goal: Refrain from acting on delusional thinking/internal stimuli  Description: Interventions:  - Monitor patient closely, per order   - Utilize least restrictive measures   - Set reasonable limits, give positive feedback for acceptable   - Administer medications as ordered and monitor of potential side effects  Outcome: Not Progressing  Goal: Recognize dysfunctional thoughts, communicate reality-based thoughts at the time of discharge  Description: Interventions:  - Provide medication and psycho-education to assist patient in compliance and developing insight into his/her illness   Outcome: Not Progressing

## 2024-11-03 NOTE — NURSING NOTE
Pt having c/o of loose stool x3 unwitnessed by staff. Medical Provider made aware and modified senna from 2 tablets to one tablet daily @ bedtime.

## 2024-11-04 PROCEDURE — 99232 SBSQ HOSP IP/OBS MODERATE 35: CPT | Performed by: PSYCHIATRY & NEUROLOGY

## 2024-11-04 RX ADMIN — ESCITALOPRAM OXALATE 20 MG: 10 TABLET, FILM COATED ORAL at 08:49

## 2024-11-04 RX ADMIN — MELATONIN TAB 3 MG 9 MG: 3 TAB at 21:14

## 2024-11-04 RX ADMIN — LORATADINE 10 MG: 10 TABLET ORAL at 08:47

## 2024-11-04 RX ADMIN — SENNOSIDES AND DOCUSATE SODIUM 1 TABLET: 50; 8.6 TABLET ORAL at 21:13

## 2024-11-04 RX ADMIN — PROPRANOLOL HYDROCHLORIDE 10 MG: 10 TABLET ORAL at 08:48

## 2024-11-04 RX ADMIN — AMLODIPINE BESYLATE 5 MG: 5 TABLET ORAL at 08:48

## 2024-11-04 RX ADMIN — NICOTINE POLACRILEX 2 MG: 2 GUM, CHEWING ORAL at 17:25

## 2024-11-04 RX ADMIN — MULTIPLE VITAMINS W/ MINERALS TAB 1 TABLET: TAB ORAL at 08:50

## 2024-11-04 RX ADMIN — ATROPINE SULFATE 1 DROP: 10 SOLUTION/ DROPS OPHTHALMIC at 21:19

## 2024-11-04 RX ADMIN — PANTOPRAZOLE SODIUM 20 MG: 20 TABLET, DELAYED RELEASE ORAL at 08:48

## 2024-11-04 RX ADMIN — NICOTINE POLACRILEX 2 MG: 2 GUM, CHEWING ORAL at 08:50

## 2024-11-04 RX ADMIN — NICOTINE POLACRILEX 2 MG: 2 GUM, CHEWING ORAL at 12:53

## 2024-11-04 RX ADMIN — GLYCOPYRROLATE 2 MG: 1 TABLET ORAL at 21:13

## 2024-11-04 RX ADMIN — NICOTINE POLACRILEX 2 MG: 2 GUM, CHEWING ORAL at 21:14

## 2024-11-04 RX ADMIN — CLOZAPINE 225 MG: 25 TABLET ORAL at 21:13

## 2024-11-04 RX ADMIN — ARIPIPRAZOLE 30 MG: 15 TABLET ORAL at 08:50

## 2024-11-04 RX ADMIN — HYDROCHLOROTHIAZIDE 12.5 MG: 12.5 TABLET ORAL at 08:50

## 2024-11-04 RX ADMIN — PROPRANOLOL HYDROCHLORIDE 10 MG: 10 TABLET ORAL at 21:13

## 2024-11-04 RX ADMIN — GLYCOPYRROLATE 1 MG: 1 TABLET ORAL at 13:00

## 2024-11-04 RX ADMIN — GLYCOPYRROLATE 1 MG: 1 TABLET ORAL at 08:49

## 2024-11-04 NOTE — ASSESSMENT & PLAN NOTE
Reviewed on 11/4/24  Follows with medical  No associated orders from this encounter found during lookback period of 72 hours.

## 2024-11-04 NOTE — ASSESSMENT & PLAN NOTE
Reviewed on 11/4/24  Follows with medical  Continue famotidine 20 mg tablet BID per medical team   Continue glycopyrrolate 1 mg tablet BID AM and afternoon per medical team  Continue glycopyrrolate 2 mg tablet before bed per medical team  Continue pantoprazole EC 20 mg tablet every morning per medical team  No associated orders from this encounter found during lookback period of 72 hours.

## 2024-11-04 NOTE — ASSESSMENT & PLAN NOTE
Reviewed on 11/4/24  Follows with medical  Continue senna-docusate sodium two tablets before bed per medical team  No associated orders from this encounter found during lookback period of 72 hours.

## 2024-11-04 NOTE — ASSESSMENT & PLAN NOTE
Reviewed on 11/4/24    Reports continued depression and anxiety, as well as symptoms consistent with AH. He notes that the voices threaten to harm him. Patient denies any SI and HI, and denies symptoms consistent with VH. Patient received Haldol 1 mg PO on 11/1 for worsening AH, which appeared to help with symptoms. Today, he reports his usual AH.     Continue Clozapine 225 mg qhs for psychosis -- to consider further careful titration for Sxs depending on response  Continue to obtain CBC with diff, CRP, and CK (weekly on Tues), and a Clozapine level and BNP (every 2 weeks on Tues)    Continue Abilify 30 mg qd for psychosis  Continue Escitalopram 20 mg qd for depression and anxiety  Continue Senna-Docusate sodium one 50 mg tablet daily before bed for constipation  - decreased from two tablets daily on 11/3 due to reported loose stools  Continue propanolol 10 mg bid for anxiety   Continue amlodipine 5 mg PO daily     Pt remains on dual antipsychotic Tx due to failure on monotherapy      ACT team referral was made  No associated orders from this encounter found during lookback period of 72 hours.

## 2024-11-04 NOTE — PROGRESS NOTES
11/04/24 0901   Team Meeting   Meeting Type Daily Rounds   Team Members Present   Team Members Present Physician;Nurse;;Other (Discipline and Name)   Physician Team Member Holter, Thomas   Nursing Team Member Mohsen   Social Work Team Member Jose J ORTEGA   Other (Discipline and Name) Jeremias Grace MS   Patient/Family Present   Patient Present No   Patient's Family Present No     Expected D/C Date:12/19  Groups Participation  /.   Patient's compliant with medications. He's engaged in his treatment. He's pending discharge to home with ACT services.

## 2024-11-04 NOTE — ASSESSMENT & PLAN NOTE
Reviewed on 11/4/24  Continue to encourage cessation upon discharge  No associated orders from this encounter found during lookback period of 72 hours.

## 2024-11-04 NOTE — ASSESSMENT & PLAN NOTE
Reviewed on 11/4/24  Ongoing by medical  No associated orders from this encounter found during lookback period of 72 hours.

## 2024-11-04 NOTE — NURSING NOTE
Pt is calm, cooperative and visible on milieu. Pt reports AH, depression and anxiety; denies SI/HI/VH. Compliant with medications and meals. Pt interacts appropriately with staff and peers. No behavioral issues. Q 15 min checks maintained.

## 2024-11-04 NOTE — NURSING NOTE
Patient requested Nicorette gum at this time. Patient isolative to room and self all evening.  Patient not seen interacting with any peers but his roommate. Very supportive of his roommate as well. Patient pleasant upon approach. Medication compliant and did not offer any complaints. Behaviors controlled and appropriate all shift.

## 2024-11-04 NOTE — PROGRESS NOTES
Progress Note - Behavioral Health   Name: Alberto Berumen 29 y.o. male I MRN: [id number]  Unit/Bed#: [facility] 112-02 I Date of Admission: 3/29/2024   Date of Service: 11/4/2024 I Hospital Day: 220     Assessment & Plan  Schizoaffective disorder, bipolar type (HCC)  Reviewed on 11/4/24    Reports continued depression and anxiety, as well as symptoms consistent with AH. He notes that the voices threaten to harm him. Patient denies any SI and HI, and denies symptoms consistent with VH. Patient received Haldol 1 mg PO on 11/1 for worsening AH, which appeared to help with symptoms. Today, he reports his usual AH.     Continue Clozapine 225 mg qhs for psychosis -- to consider further careful titration for Sxs depending on response  Continue to obtain CBC with diff, CRP, and CK (weekly on Tues), and a Clozapine level and BNP (every 2 weeks on Tues)    Continue Abilify 30 mg qd for psychosis  Continue Escitalopram 20 mg qd for depression and anxiety  Continue Senna-Docusate sodium one 50 mg tablet daily before bed for constipation  - decreased from two tablets daily on 11/3 due to reported loose stools  Continue propanolol 10 mg bid for anxiety   Continue amlodipine 5 mg PO daily     Pt remains on dual antipsychotic Tx due to failure on monotherapy      ACT team referral was made  No associated orders from this encounter found during lookback period of 72 hours.    Chronic idiopathic constipation  Reviewed on 11/4/24  Follows with medical  Continue senna-docusate sodium two tablets before bed per medical team  No associated orders from this encounter found during lookback period of 72 hours.  GERD (gastroesophageal reflux disease)  Reviewed on 11/4/24  Follows with medical  Continue famotidine 20 mg tablet BID per medical team   Continue glycopyrrolate 1 mg tablet BID AM and afternoon per medical team  Continue glycopyrrolate 2 mg tablet before bed per medical team  Continue pantoprazole EC 20 mg tablet every morning per  "medical team  No associated orders from this encounter found during lookback period of 72 hours.  Medical clearance for psychiatric admission  Reviewed on 11/4/24  Ongoing by medical  No associated orders from this encounter found during lookback period of 72 hours.    Tobacco abuse  Reviewed on 11/4/24  Continue to encourage cessation upon discharge  No associated orders from this encounter found during lookback period of 72 hours.  Elevated hemoglobin A1c  Reviewed on 11/4/24  Follows with medical  No associated orders from this encounter found during lookback period of 72 hours.      [facility]  Psychiatric Progress Note - Department of Behavioral Health   Alberto Berumen 29 y.o. male MRN: [id number]  Unit/Bed#: [facility] 112-02 Encounter: [id number]    SUBJECTIVE     Patient evaluated this a.m. for continuity of care. Patient was discussed at length with nursing and treatment team. Per nursing, patient received Haldol 1 mg PO on 11/2 for worsened AH, after which patient appeared less agitated but continued to report voices. Patient reported unwitnessed loose stools x2 days over the weekend, for which his senna-docusate 8.6-50mg PO bedtime dosage was decreased on 11/3 from two tablets to one tablet. Nursing requested a podiatry referral for the patient, as well as noted that his eyes appear more blood shot than normal, noting a concern of sleep apnea. Patient attended 3 out of 10 groups on Friday, and has had good appetite, with meal consumption yesterday of 0%/100%/100%.     During evaluation, no acute distress is noted. Alberto Berumen is calm and cooperative this morning. Patient reports \"unchanged\" auditory hallucinations with voices that threaten to harm him. He denies SI and HI. He further reports \"unchanged\" symptoms of depression and anxiety, as well as fatigue, despite noting good sleep throughout the past couple of nights. He denies symptoms consistent with visual hallucinations. " Patient notes that he has not had a recent bowel movement to assess if his loose bowels have improved. He requested nicorette gum at times over the weekend, and notes that he always chews it daily every 4 hours to help alleviate any tobacco cravings. When interviewer asked about why nursing may have requested a podiatry referral, patient notes that he needs his nails clipped.     Over the weekend, he was able to talk over the phone with his mother and sister, but no family visits are currently planned. He was unable to workout due to floor repairs. He skipped breakfast this morning but intends to attend lunch.       PSYCHIATRIC REVIEW OF SYSTEMS     Behavior over the last 24 hours:  unchanged  Sleep: normal  Appetite: normal  Medication side effects: No  Always  Progress Toward Goals: Patient is progressing positively towards goals, as evidenced by his participation in individual, social and therapeutic milieu in addition to adherence to his medication regimen.  Patient Acceptance: Patient is accepting of and adherent to his medication regimen.   Patient Understanding: Patient is understanding of his need for treatment and acknowledges his persistent symptoms.   Patient Involvement in Reintegration Process: Patient is adherent to treatment regimen, and is in positive contact with his family.   Patient's Therapeutic Relationship with Psychiatrist: Trusting.   Patient's Optimism Regarding Recovery: Patient continues to attend groups, adhere to regimen, and remain in contact with family, in hopes for progress towards discharge.   Group Attendance: Patient attended 3 out of 10 groups on Friday.     REVIEW OF SYSTEMS   Review of systems: Fatigue     OBJECTIVE     Vital Signs in Past 24 Hours:  Temp:  [98.4 °F (36.9 °C)] 98.4 °F (36.9 °C)  HR:  [85-96] 85  BP: (111-145)/(65-72) 111/65  Resp:  [18] 18  SpO2:  [97 %] 97 %    Intake/Output in Past 24 hours:  No intake/output data recorded.  No intake/output data  "recorded.        Laboratory Results:  I have personally reviewed all pertinent laboratory/tests results.  Labs in last 72 hours: No results for input(s): \"WBC\", \"RBC\", \"HGB\", \"HCT\", \"PLT\", \"RDW\", \"TOTANEUTABS\", \"NEUTROABS\", \"SODIUM\", \"K\", \"CL\", \"CO2\", \"BUN\", \"CREATININE\", \"GLUC\", \"GLUF\", \"CALCIUM\", \"AST\", \"ALT\", \"ALKPHOS\", \"TP\", \"ALB\", \"TBILI\", \"CHOLESTEROL\", \"HDL\", \"TRIG\", \"LDLCALC\", \"VALPROICTOT\", \"CARBAMAZEPIN\", \"LITHIUM\", \"AMMONIA\", \"[id number]\", \"FREET4\", \"T3FREE\", \"PREGTESTUR\", \"PREGSERUM\", \"HCG\", \"HCGQUANT\", \"RPR\" in the last 72 hours.    Behavioral Health Medications: all current active meds have been reviewed.    Current Facility-Administered Medications   Medication Dose Route Frequency Provider Last Rate    acetaminophen  650 mg Oral Q4H PRN Jordan C Holter,       acetaminophen  650 mg Oral Q6H PRN HOLLI Lion      aluminum-magnesium hydroxide-simethicone  30 mL Oral Q4H PRN Jordan C Holter, DO      amLODIPine  5 mg Oral Daily HOLLI Lion      ARIPiprazole  30 mg Oral Daily Bora Rosario MD      Artificial Tears  1 drop Both Eyes Q3H PRN Jordan C Holter, DO      atropine  1 drop Sublingual  Harjeet Torres DO      haloperidol lactate  2.5 mg Intramuscular Q4H PRN Max 4/day HOLLI Lion      And    LORazepam  1 mg Intramuscular Q4H PRN Max 4/day HOLLI Lion      And    benztropine  0.5 mg Intramuscular Q4H PRN Max 4/day HOLLI Lion      benztropine  1 mg Intramuscular Q4H PRN Max 6/day Jordan C Holter, DO      haloperidol lactate  5 mg Intramuscular Q4H PRN Max 4/day HOLLI Lion      And    LORazepam  2 mg Intramuscular Q4H PRN Max 4/day HOLLI Lion      And    benztropine  1 mg Intramuscular Q4H PRN Max 4/day HOLLI Lion      benztropine  1 mg Oral Q4H PRN Max 6/day HOLLI Lion      benztropine  1 mg Oral Q4H PRN Max 6/day Jordan C Holter,       bisacodyl  10 mg Rectal Daily PRN HOLLI Lion      calcium carbonate  500 mg Oral " BID PRN HOLLI Monge      cloZAPine  225 mg Oral HS Hardik So MD      hydrOXYzine HCL  50 mg Oral Q6H PRN Max 4/day Ion FISCHER Holter, DO      Or    diphenhydrAMINE  50 mg Intramuscular Q6H PRN Ion FISCHER Holter, DO      hydrOXYzine HCL  50 mg Oral Q6H PRN Max 4/day HOLLI Lion      Or    diphenhydrAMINE  50 mg Intramuscular Q6H PRN HOLLI Lion      diphenhydrAMINE-zinc acetate   Topical BID PRN HOLLI Lion      escitalopram  20 mg Oral Daily HOLLI Lion      famotidine  20 mg Oral BID PRN HOLLI Monge      fluticasone  1 spray Each Nare Daily PRN HOLLI Monge      glycopyrrolate  1 mg Oral BID (AM & Afternoon) Bora Rosario MD      glycopyrrolate  2 mg Oral HS Bora Rosario MD      haloperidol  1 mg Oral Q6H PRN HOLLI Lion      haloperidol  2.5 mg Oral Q4H PRN Max 4/day HOLLI Lion      haloperidol  5 mg Oral Q4H PRN Max 4/day HOLLI Lion      hydroCHLOROthiazide  12.5 mg Oral Daily HOLLI Galvan      hydrocortisone   Topical 4x Daily PRN HOLLI Lion      hydrOXYzine HCL  100 mg Oral Q6H PRN Max 4/day Ion FISCHER Holter, DO      Or    LORazepam  2 mg Intramuscular Q6H PRN Ion Dupontter, DO      hydrOXYzine HCL  100 mg Oral Q6H PRN Max 4/day HOLLI Lion      Or    LORazepam  2 mg Intramuscular Q6H PRN HOLLI Lion      hydrOXYzine HCL  25 mg Oral Q6H PRN Max 4/day Ion FISCHER Holter, DO      ibuprofen  600 mg Oral Q8H PRN HOLLI Lion      influenza vaccine  0.5 mL Intramuscular Once Bora Rosario MD      loratadine  10 mg Oral Daily Brina Guillen MD      melatonin  3 mg Oral HS PRN Bora Rosario MD      melatonin  9 mg Oral HS Bora Rosario MD      methocarbamol  500 mg Oral Q6H PRN HOLLI Lion      multivitamin-minerals  1 tablet Oral Daily HOLLI Monge      nicotine polacrilex  2 mg Oral Q4H PRN Bora Rosario MD      OLANZapine  5 mg Oral Q4H  "PRN Max 3/day Ion C Holter, DO      Or    OLANZapine  2.5 mg Intramuscular Q4H PRN Max 3/day [facility] Holter, DO      OLANZapine  5 mg Oral Q3H PRN Max 3/day [facility] Holter, DO      Or    OLANZapine  5 mg Intramuscular Q3H PRN Max 3/day Ion C Holter, DO      OLANZapine  2.5 mg Oral Q4H PRN Max 6/day [facility] Holter, DO      ondansetron  4 mg Oral Q6H PRN HOLLI Lion      pantoprazole  20 mg Oral QAM [facility] Holter, DO      polyethylene glycol  17 g Oral Daily PRN Sofi Yoo, HOLLI      propranolol  10 mg Oral Q12H KAYLIE HOLLI Lion      senna-docusate sodium  1 tablet Oral Daily PRN [facility] Cresencioter, DO      senna-docusate sodium  1 tablet Oral HS Eileen Jensen, HOLLI      traZODone  50 mg Oral HS PRN HOLLI Lion      white petrolatum-mineral oil   Topical TID PRN HOLLI Lion         Risks, benefits and possible side effects of Medications:   Risks, benefits, and possible side effects of medications explained to patient and patient verbalizes understanding.      Dual Antipsychotic Rationale: Due to inefficacy of previous treatment regimen.      Mental Status Evaluation:  Appearance:  Patient appears tired but in no acute distress. He is wearing a black sweatshirt over his gray hospital gown and light green hospital pants.    Behavior:  Patient is seated at the table, avoiding eye contact by looking at the ground throughout the interview. No stereotyped behaviors are noted.    Speech:  normal rate, normal volume, normal pitch, fluent, clear, coherent   Mood:  \"Tired\"   Affect:  constricted, depressed, mood-congruent, stable throughout interview, appropriate to thought content   Thought Process:  organized, logical, coherent, goal directed, linear, normal rate of thoughts, normal abstract reasoning   Associations: intact associations   Thought Content:  Patient expresses symptoms of persecutory delusions and paranoia in regards to the voices that he is hearing. He denies SI " and HI.    Perceptual Disturbances: Patient endorses symptoms consistent with AH but denies VH. He does not appear to be responding to internal stimuli at time of interview.   Risk Potential: Suicidal ideation - denies  Homicidal ideation - denies  Potential for aggression - Not at present   Sensorium:  oriented to person, place, and time/date   Memory:  recent and remote memory grossly intact   Consciousness:  alert and awake   Attention/Concentration: attention span and concentration are age appropriate   Insight:  age appropriate   Judgment: age appropriate   Gait/Station: normal gait/station, normal balance   Motor Activity: no abnormal movements     Recommended Treatment:   See above for assessment and plan.    Counseling/Coordination of Care    Total unit time spent today was greater than 15 minutes. Greater than 50% of total time was spent with the patient and/or patient's relatives and/or coordination of patient's care.     Patient's rights, confidentiality, exceptions to confidentiality, use of electronic medical record including appropriate staff access to medical record regarding behavioral health services and consent to treatment were reviewed.    Note Share:     This note was not shared with the patient due to reasonable likelihood of causing patient harm     Yamila Lopes   Psychiatry, PA-S

## 2024-11-04 NOTE — PLAN OF CARE
Problem: Alteration in Thoughts and Perception  Goal: Agree to be compliant with medication regime, as prescribed and report medication side effects  Description: Interventions:  - Offer appropriate PRN medication and supervise ingestion; conduct AIMS, as needed   Outcome: Progressing  Goal: Attend and participate in unit activities, including therapeutic, recreational, and educational groups  Description: Interventions:  -Encourage Visitation and family involvement in care  Outcome: Progressing  Goal: Complete daily ADLs, including personal hygiene independently, as able  Description: Interventions:  - Observe, teach, and assist patient with ADLS  - Monitor and promote a balance of rest/activity, with adequate nutrition and elimination   Outcome: Progressing     Problem: Depression  Goal: Refrain from harming self  Description: Interventions:  - Monitor patient closely, per order   - Supervise medication ingestion, monitor effects and side effects   Outcome: Progressing  Goal: Refrain from self-neglect  Outcome: Progressing     Problem: Alteration in Thoughts and Perception  Goal: Treatment Goal: Gain control of psychotic behaviors/thinking, reduce/eliminate presenting symptoms and demonstrate improved reality functioning upon discharge  Outcome: Not Progressing     Problem: Depression  Goal: Refrain from isolation  Description: Interventions:  - Develop a trusting relationship   - Encourage socialization   Outcome: Not Progressing

## 2024-11-05 LAB
BASOPHILS # BLD AUTO: 0.06 THOUSANDS/ÂΜL (ref 0–0.1)
BASOPHILS NFR BLD AUTO: 1 % (ref 0–1)
CLOZAPINE SERPL-MCNC: 252 NG/ML (ref 350–900)
CRP SERPL QL: 10 MG/L
EOSINOPHIL # BLD AUTO: 0.46 THOUSAND/ÂΜL (ref 0–0.61)
EOSINOPHIL NFR BLD AUTO: 5 % (ref 0–6)
ERYTHROCYTE [DISTWIDTH] IN BLOOD BY AUTOMATED COUNT: 14.4 % (ref 11.6–15.1)
HCT VFR BLD AUTO: 43.2 % (ref 36.5–49.3)
HGB BLD-MCNC: 13.3 G/DL (ref 12–17)
IMM GRANULOCYTES # BLD AUTO: 0.03 THOUSAND/UL (ref 0–0.2)
IMM GRANULOCYTES NFR BLD AUTO: 0 % (ref 0–2)
LYMPHOCYTES # BLD AUTO: 2.56 THOUSANDS/ÂΜL (ref 0.6–4.47)
LYMPHOCYTES NFR BLD AUTO: 29 % (ref 14–44)
MCH RBC QN AUTO: 23.5 PG (ref 26.8–34.3)
MCHC RBC AUTO-ENTMCNC: 30.8 G/DL (ref 31.4–37.4)
MCV RBC AUTO: 76 FL (ref 82–98)
MISCELLANEOUS LAB TEST RESULT: NORMAL
MONOCYTES # BLD AUTO: 0.87 THOUSAND/ÂΜL (ref 0.17–1.22)
MONOCYTES NFR BLD AUTO: 10 % (ref 4–12)
NEUTROPHILS # BLD AUTO: 4.74 THOUSANDS/ÂΜL (ref 1.85–7.62)
NEUTS SEG NFR BLD AUTO: 55 % (ref 43–75)
NRBC BLD AUTO-RTO: 0 /100 WBCS
PLATELET # BLD AUTO: 292 THOUSANDS/UL (ref 149–390)
PMV BLD AUTO: 10.5 FL (ref 8.9–12.7)
RBC # BLD AUTO: 5.66 MILLION/UL (ref 3.88–5.62)
WBC # BLD AUTO: 8.72 THOUSAND/UL (ref 4.31–10.16)

## 2024-11-05 PROCEDURE — 99232 SBSQ HOSP IP/OBS MODERATE 35: CPT | Performed by: PSYCHIATRY & NEUROLOGY

## 2024-11-05 PROCEDURE — 82550 ASSAY OF CK (CPK): CPT

## 2024-11-05 PROCEDURE — 85025 COMPLETE CBC W/AUTO DIFF WBC: CPT

## 2024-11-05 PROCEDURE — 86140 C-REACTIVE PROTEIN: CPT

## 2024-11-05 PROCEDURE — 82553 CREATINE MB FRACTION: CPT

## 2024-11-05 PROCEDURE — 80159 DRUG ASSAY CLOZAPINE: CPT | Performed by: PSYCHIATRY & NEUROLOGY

## 2024-11-05 RX ADMIN — NICOTINE POLACRILEX 2 MG: 2 GUM, CHEWING ORAL at 17:00

## 2024-11-05 RX ADMIN — ATROPINE SULFATE 1 DROP: 10 SOLUTION/ DROPS OPHTHALMIC at 21:12

## 2024-11-05 RX ADMIN — NICOTINE POLACRILEX 2 MG: 2 GUM, CHEWING ORAL at 08:17

## 2024-11-05 RX ADMIN — GLYCOPYRROLATE 1 MG: 1 TABLET ORAL at 13:00

## 2024-11-05 RX ADMIN — MULTIPLE VITAMINS W/ MINERALS TAB 1 TABLET: TAB ORAL at 08:14

## 2024-11-05 RX ADMIN — NICOTINE POLACRILEX 2 MG: 2 GUM, CHEWING ORAL at 21:13

## 2024-11-05 RX ADMIN — ESCITALOPRAM OXALATE 20 MG: 10 TABLET, FILM COATED ORAL at 08:17

## 2024-11-05 RX ADMIN — SENNOSIDES AND DOCUSATE SODIUM 1 TABLET: 50; 8.6 TABLET ORAL at 21:12

## 2024-11-05 RX ADMIN — PROPRANOLOL HYDROCHLORIDE 10 MG: 10 TABLET ORAL at 08:17

## 2024-11-05 RX ADMIN — GLYCOPYRROLATE 2 MG: 1 TABLET ORAL at 21:13

## 2024-11-05 RX ADMIN — HYDROCHLOROTHIAZIDE 12.5 MG: 12.5 TABLET ORAL at 08:16

## 2024-11-05 RX ADMIN — PANTOPRAZOLE SODIUM 20 MG: 20 TABLET, DELAYED RELEASE ORAL at 08:16

## 2024-11-05 RX ADMIN — LORATADINE 10 MG: 10 TABLET ORAL at 08:14

## 2024-11-05 RX ADMIN — GLYCOPYRROLATE 1 MG: 1 TABLET ORAL at 08:15

## 2024-11-05 RX ADMIN — MELATONIN TAB 3 MG 9 MG: 3 TAB at 21:13

## 2024-11-05 RX ADMIN — CLOZAPINE 225 MG: 25 TABLET ORAL at 21:12

## 2024-11-05 RX ADMIN — NICOTINE POLACRILEX 2 MG: 2 GUM, CHEWING ORAL at 12:51

## 2024-11-05 RX ADMIN — ARIPIPRAZOLE 30 MG: 15 TABLET ORAL at 08:18

## 2024-11-05 RX ADMIN — PROPRANOLOL HYDROCHLORIDE 10 MG: 10 TABLET ORAL at 21:12

## 2024-11-05 RX ADMIN — AMLODIPINE BESYLATE 5 MG: 5 TABLET ORAL at 08:16

## 2024-11-05 NOTE — ASSESSMENT & PLAN NOTE
Reviewed on 11/5/24  Follows with medical  Continue senna-docusate sodium dose two tablets before bed per medical team.   - Patient dose was decreased to one tablet nightly on 11/3 due to reports of loose stools. Patient intends to discuss changing this back to two tablets today (11/5) with nursing staff.   No associated orders from this encounter found during lookback period of 72 hours.

## 2024-11-05 NOTE — ASSESSMENT & PLAN NOTE
Reviewed on 11/5/24  Follows with medical  No associated orders from this encounter found during lookback period of 72 hours.

## 2024-11-05 NOTE — PROGRESS NOTES
Progress Note - Behavioral Health   Name: Alberto Berumen 29 y.o. male I MRN: [id number]  Unit/Bed#: [facility] 112-02 I Date of Admission: 3/29/2024   Date of Service: 11/5/2024 I Hospital Day: 221     Assessment & Plan  Schizoaffective disorder, bipolar type (HCC)  Reviewed on 11/5/24    Patient reports continued depression and anxiety, as well as symptoms consistent with AH. He notes that the voices threaten to harm him and his family. Patient denies any SI and HI, and denies symptoms consistent with VH. Patient notes persecutory delusions in regards to the voices he is hearing, but he denies delusions of grandiose, referential, or bizarre nature, and does not appear to be responding to internal stimuli.     Continue Clozapine 225 mg qhs for psychosis -- to consider further careful titration for Sxs depending on response  Continue to obtain CBC with diff, CRP, and CK (weekly on Tues), and a Clozapine level and BNP (every 2 weeks on Tues)    Continue Abilify 30 mg qd for psychosis  Continue Escitalopram 20 mg qd for depression and anxiety  Continue Senna-Docusate sodium one 50 mg tablet daily before bed for constipation  - decreased from two tablets daily on 11/3 due to reported loose stools  Continue propanolol 10 mg bid for anxiety   Continue amlodipine 5 mg PO daily     Pt remains on dual antipsychotic Tx due to failure on monotherapy      ACT team referral was made  No associated orders from this encounter found during lookback period of 72 hours.    Chronic idiopathic constipation  Reviewed on 11/5/24  Follows with medical  Continue senna-docusate sodium dose two tablets before bed per medical team.   - Patient dose was decreased to one tablet nightly on 11/3 due to reports of loose stools. Patient intends to discuss changing this back to two tablets today (11/5) with nursing staff.   No associated orders from this encounter found during lookback period of 72 hours.  GERD (gastroesophageal reflux  "disease)  Reviewed on 11/5/24  Follows with medical  Continue famotidine 20 mg tablet BID per medical team   Continue glycopyrrolate 1 mg tablet BID AM and afternoon per medical team  Continue glycopyrrolate 2 mg tablet before bed per medical team  Continue pantoprazole EC 20 mg tablet every morning per medical team  No associated orders from this encounter found during lookback period of 72 hours.  Medical clearance for psychiatric admission  Reviewed on 11/5/24  Ongoing by medical  No associated orders from this encounter found during lookback period of 72 hours.    Tobacco abuse  Reviewed on 11/5/24  Continue to encourage cessation upon discharge  No associated orders from this encounter found during lookback period of 72 hours.  Elevated hemoglobin A1c  Reviewed on 11/5/24  Follows with medical  No associated orders from this encounter found during lookback period of 72 hours.      [facility]  Psychiatric Progress Note - Department of Behavioral Health   Alberto Berumen 29 y.o. male MRN: [id number]  Unit/Bed#: [facility] 112-02 Encounter: [id number]    SUBJECTIVE     Patient evaluated this a.m. for continuity of care. Patient was discussed at length with nursing and treatment team. Per nursing, patient continues to reports symptoms of depression, anxiety, and auditory hallucinations. His meal consumption was poor yesterday (0%/25%/25%) with reports from the patient that he is trying to lose weight. He also requested nicotine gum from nursing staff yesterday evening. He is otherwise interactive with peers and attended 8 out of 10 groups yesterday.     During evaluation, no acute distress is noted. Alberto Berumen is calm and cooperative this morning. He notes that he is feeling \"tired\" today, but reports good sleep. He otherwise notes unchanged symptoms of depression and anxiety, and endorses symptoms consistent with AH. He reports symptoms consistent with persecutory delusions, as the " voices he hears are threatening to harm him and his family, but denies delusions of grandiose or referential nature. He denies symptoms consistent with VH. When asked if his loose bowels have improved, he notes that he is now experiencing mild constipation and plans to inform the nursing staff today.    Patient states that he missed a call from his sister yesterday, and hopes to call her back today. He attended breakfast this morning, and has filled out his menu for lunch.     PSYCHIATRIC REVIEW OF SYSTEMS     Behavior over the last 24 hours:  unchanged  Sleep: normal  Appetite: poor  Medication side effects: No    Progress Toward Goals: Patient is progressing positively towards goals, as evidenced by his participation in individual, social and therapeutic milieu in addition to adherence to his medication regimen.  Patient Acceptance: Patient is accepting of and cooperative with his medication regimen.   Patient Understanding: Patient is understanding of his need for treatment and acknowledges his symptoms.   Patient Involvement in Reintegration Process: Patient is in frequent contact with family, is cooperative with regimen, and is involved in groups.   Patient's Therapeutic Relationship with Psychiatrist: Trusting.   Patient's Optimism Regarding Recovery: Patient continues to work towards discharge-ready status.   Group Attendance: Patient attended 8 out of 10 groups yesterday.     REVIEW OF SYSTEMS   Review of systems: fatigue     OBJECTIVE     Vital Signs in Past 24 Hours:  Temp:  [97.5 °F (36.4 °C)-97.6 °F (36.4 °C)] 97.6 °F (36.4 °C)  HR:  [79-91] 84  BP: (126-134)/(61-82) 129/68  Resp:  [18-20] 20  SpO2:  [98 %-99 %] 99 %  O2 Device: None (Room air)    Intake/Output in Past 24 hours:  I/O last 3 completed shifts:  In: 240 [P.O.:240]  Out: -   No intake/output data recorded.        Laboratory Results:  I have personally reviewed all pertinent laboratory/tests results.  Labs in last 72 hours: No results for  "input(s): \"WBC\", \"RBC\", \"HGB\", \"HCT\", \"PLT\", \"RDW\", \"TOTANEUTABS\", \"NEUTROABS\", \"SODIUM\", \"K\", \"CL\", \"CO2\", \"BUN\", \"CREATININE\", \"GLUC\", \"GLUF\", \"CALCIUM\", \"AST\", \"ALT\", \"ALKPHOS\", \"TP\", \"ALB\", \"TBILI\", \"CHOLESTEROL\", \"HDL\", \"TRIG\", \"LDLCALC\", \"VALPROICTOT\", \"CARBAMAZEPIN\", \"LITHIUM\", \"AMMONIA\", \"CWS1MBROPVHA\", \"FREET4\", \"T3FREE\", \"PREGTESTUR\", \"PREGSERUM\", \"HCG\", \"HCGQUANT\", \"RPR\" in the last 72 hours.    Behavioral Health Medications: all current active meds have been reviewed.    Current Facility-Administered Medications   Medication Dose Route Frequency Provider Last Rate    acetaminophen  650 mg Oral Q4H PRN Jordan C Holter, DO      acetaminophen  650 mg Oral Q6H PRN HOLLI Lion      aluminum-magnesium hydroxide-simethicone  30 mL Oral Q4H PRN Jordan C Holter,       amLODIPine  5 mg Oral Daily HOLLI Lion      ARIPiprazole  30 mg Oral Daily Bora Rosario MD      Artificial Tears  1 drop Both Eyes Q3H PRN Jordan C Holter, DO      atropine  1 drop Sublingual  Harjeet Torres,       haloperidol lactate  2.5 mg Intramuscular Q4H PRN Max 4/day HOLLI Lion      And    LORazepam  1 mg Intramuscular Q4H PRN Max 4/day HOLLI Lion      And    benztropine  0.5 mg Intramuscular Q4H PRN Max 4/day HOLLI Lion      benztropine  1 mg Intramuscular Q4H PRN Max 6/day Jordan C Holter,       haloperidol lactate  5 mg Intramuscular Q4H PRN Max 4/day HOLLI Lion      And    LORazepam  2 mg Intramuscular Q4H PRN Max 4/day HOLLI Lion      And    benztropine  1 mg Intramuscular Q4H PRN Max 4/day HOLLI Lion      benztropine  1 mg Oral Q4H PRN Max 6/day HOLLI Lion      benztropine  1 mg Oral Q4H PRN Max 6/day Jordan C Holter, DO      bisacodyl  10 mg Rectal Daily PRN HOLLI Lion      calcium carbonate  500 mg Oral BID PRN HOLLI Monge      cloZAPine  225 mg Oral HS Hardik So MD      hydrOXYzine HCL  50 mg Oral Q6H PRN Max " 4/day Ion Dupontter, DO      Or    diphenhydrAMINE  50 mg Intramuscular Q6H PRN Jordan C Holter, DO      hydrOXYzine HCL  50 mg Oral Q6H PRN Max 4/day HOLLI Loin      Or    diphenhydrAMINE  50 mg Intramuscular Q6H PRN HOLLI Lion      diphenhydrAMINE-zinc acetate   Topical BID PRN HOLLI Lion      escitalopram  20 mg Oral Daily HOLLI Lion      famotidine  20 mg Oral BID PRN HOLLI Monge      fluticasone  1 spray Each Nare Daily PRN HOLLI Monge      glycopyrrolate  1 mg Oral BID (AM & Afternoon) Bora Rosario MD      glycopyrrolate  2 mg Oral HS Bora Rosario MD      haloperidol  1 mg Oral Q6H PRN HOLLI Lion      haloperidol  2.5 mg Oral Q4H PRN Max 4/day HOLLI Lion      haloperidol  5 mg Oral Q4H PRN Max 4/day HOLLI Lion      hydroCHLOROthiazide  12.5 mg Oral Daily Pia Mantilla, HOLLI      hydrocortisone   Topical 4x Daily PRN HOLLI Lion      hydrOXYzine HCL  100 mg Oral Q6H PRN Max 4/day Jordan C Holter, DO      Or    LORazepam  2 mg Intramuscular Q6H PRN Jordan C Holter, DO      hydrOXYzine HCL  100 mg Oral Q6H PRN Max 4/day HOLLI Lion      Or    LORazepam  2 mg Intramuscular Q6H PRN HOLLI Lion      hydrOXYzine HCL  25 mg Oral Q6H PRN Max 4/day Ion Dupontter, DO      ibuprofen  600 mg Oral Q8H PRN HOLLI Lion      influenza vaccine  0.5 mL Intramuscular Once Bora Rosario MD      loratadine  10 mg Oral Daily Brina Guillen MD      melatonin  3 mg Oral HS PRN Bora Rosario MD      melatonin  9 mg Oral HS Bora Rosario MD      methocarbamol  500 mg Oral Q6H PRN HOLLI Lion      multivitamin-minerals  1 tablet Oral Daily Eileen Jensen, HOLLI      nicotine polacrilex  2 mg Oral Q4H PRN Bora Rosario MD      OLANZapine  5 mg Oral Q4H PRN Max 3/day Jordan C Holter, DO      Or    OLANZapine  2.5 mg Intramuscular Q4H PRN Max 3/day Jordan C Holter,       OLANZapine  5 mg Oral  "Q3H PRN Max 3/day Ion C Holter, DO      Or    OLANZapine  5 mg Intramuscular Q3H PRN Max 3/day Ion C Holter, DO      OLANZapine  2.5 mg Oral Q4H PRN Max 6/day Ion C Holter, DO      ondansetron  4 mg Oral Q6H PRN HOLLI Lion      pantoprazole  20 mg Oral QAM Ion C Holter, DO      polyethylene glycol  17 g Oral Daily PRN HOLLI Ghotra      propranolol  10 mg Oral Q12H KAYLIE HOLLI Lion      senna-docusate sodium  1 tablet Oral Daily PRN Ion C Holter, DO      senna-docusate sodium  1 tablet Oral HS Eileen GonzalezHOLLI evans      traZODone  50 mg Oral HS PRN HOLLI Lion      white petrolatum-mineral oil   Topical TID PRN HOLLI Lion         Risks, benefits and possible side effects of Medications:   Risks, benefits, and possible side effects of medications explained to patient and patient verbalizes understanding.      Dual Antipsychotic Rationale: Due to previous treatment inefficacy.      Mental Status Evaluation:  Appearance:  Patient appears slightly disheveled and tired, but in no acute distress. Patient is wearing a black sweatshirt over a gray hospital gown and light green hospital pants.    Behavior:  Patient is calm and cooperative this morning. He is walking the halls at the time of this interview. He is looking at the ground for the majority of the interview, but no stereotyped behavior is noted.    Speech:  normal volume, normal pitch, fluent, clear, coherent, with short responses   Mood:  \"Tired\"   Affect:  Depressed, mood-congruent, constricted, appropriate to thought content, stable throughout interview   Thought Process:  organized, logical, coherent, goal directed, linear, normal rate of thoughts   Associations: intact associations   Thought Content:  Patient notes symptoms of persecutory delusions in regards to the voices he is hearing, but he denies delusions of grandiose, bizarre, or referential nature. He denies SI and HI.    Perceptual Disturbances: " Patient endorses symptoms consistent with AH, but denies VH. He does not appear to be responding to internal stimuli at this time.    Risk Potential: Suicidal ideation - denies  Homicidal ideation - denies  Potential for aggression - Not at present   Sensorium:  oriented to person, place, and time/date   Memory:  recent and remote memory grossly intact   Consciousness:  alert and awake   Attention/Concentration: attention span and concentration are age appropriate   Insight:  good   Judgment: good   Gait/Station: normal gait/station   Motor Activity: no abnormal movements     Recommended Treatment:   See above for assessment and plan.    Counseling/Coordination of Care    Total unit time spent today was greater than 15 minutes. Greater than 50% of total time was spent with the patient and/or patient's relatives and/or coordination of patient's care.     Patient's rights, confidentiality, exceptions to confidentiality, use of electronic medical record including appropriate staff access to medical record regarding behavioral health services and consent to treatment were reviewed.    Note Share:     This note was not shared with the patient due to reasonable likelihood of causing patient harm     Yamila Lopes   Psychiatry, PA-S

## 2024-11-05 NOTE — ASSESSMENT & PLAN NOTE
Reviewed on 11/5/24  Follows with medical  Continue famotidine 20 mg tablet BID per medical team   Continue glycopyrrolate 1 mg tablet BID AM and afternoon per medical team  Continue glycopyrrolate 2 mg tablet before bed per medical team  Continue pantoprazole EC 20 mg tablet every morning per medical team  No associated orders from this encounter found during lookback period of 72 hours.

## 2024-11-05 NOTE — PLAN OF CARE
Problem: Alteration in Thoughts and Perception  Goal: Treatment Goal: Gain control of psychotic behaviors/thinking, reduce/eliminate presenting symptoms and demonstrate improved reality functioning upon discharge  Outcome: Progressing  Goal: Verbalize thoughts and feelings  Description: Interventions:  - Promote a nonjudgmental and trusting relationship with the patient through active listening and therapeutic communication  - Assess patient's level of functioning, behavior and potential for risk  - Engage patient in 1 on 1 interactions  - Encourage patient to express fears, feelings, frustrations, and discuss symptoms    - [place] patient to reality, help patient recognize reality-based thinking   - Administer medications as ordered and assess for potential side effects  - Provide the patient education related to the signs and symptoms of the illness and desired effects of prescribed medications  Outcome: Progressing  Goal: Agree to be compliant with medication regime, as prescribed and report medication side effects  Description: Interventions:  - Offer appropriate PRN medication and supervise ingestion; conduct AIMS, as needed   Outcome: Progressing  Goal: Attend and participate in unit activities, including therapeutic, recreational, and educational groups  Description: Interventions:  -Encourage Visitation and family involvement in care  Outcome: Progressing  Goal: Complete daily ADLs, including personal hygiene independently, as able  Description: Interventions:  - Observe, teach, and assist patient with ADLS  - Monitor and promote a balance of rest/activity, with adequate nutrition and elimination   Outcome: Progressing     Problem: Depression  Goal: Treatment Goal: Demonstrate behavioral control of depressive symptoms, verbalize feelings of improved mood/affect, and adopt new coping skills prior to discharge  Outcome: Progressing  Goal: Verbalize thoughts and feelings  Description: Interventions:  - Assess  and re-assess patient's level of risk   - Engage patient in 1:1 interactions, daily, for a minimum of 15 minutes   - Encourage patient to express feelings, fears, frustrations, hopes   Outcome: Progressing  Goal: Refrain from harming self  Description: Interventions:  - Monitor patient closely, per order   - Supervise medication ingestion, monitor effects and side effects   Outcome: Progressing  Goal: Refrain from isolation  Description: Interventions:  - Develop a trusting relationship   - Encourage socialization   Outcome: Progressing  Goal: Attend and participate in unit activities, including therapeutic, recreational, and educational groups  Description: Interventions:  - Provide therapeutic and educational activities daily, encourage attendance and participation, and document same in the medical record   Outcome: Progressing  Goal: Complete daily ADLs, including personal hygiene independently, as able  Description: Interventions:  - Observe, teach, and assist patient with ADLS  -  Monitor and promote a balance of rest/activity, with adequate nutrition and elimination   Outcome: Progressing     Problem: Anxiety  Goal: Anxiety is at manageable level  Description: Interventions:  - Assess and monitor patient's anxiety level.   - Monitor for signs and symptoms (heart palpitations, chest pain, shortness of breath, headaches, nausea, feeling jumpy, restlessness, irritable, apprehensive).   - Collaborate with interdisciplinary team and initiate plan and interventions as ordered.  - [place] patient to unit/surroundings  - Explain treatment plan  - Encourage participation in care  - Encourage verbalization of concerns/fears  - Identify coping mechanisms  - Assist in developing anxiety-reducing skills  - Administer/offer alternative therapies  - Limit or eliminate stimulants  Outcome: Progressing     Problem: Alteration in Orientation  Goal: Treatment Goal: Demonstrate a reduction of confusion and improved orientation to  person, place, time and/or situation upon discharge, according to optimum baseline/ability  Outcome: Progressing  Goal: Interact with staff daily  Description: Interventions:  - Assess and re-assess patient's level of orientation  - Engage patient in 1 on 1 interactions, daily, for a minimum of 15 minutes   - Establish rapport/trust with patient   Outcome: Progressing  Goal: Attend and participate in unit activities, including therapeutic, recreational, and educational groups  Description: Interventions:  - Provide therapeutic and educational activities daily, encourage attendance and participation, and document same in the medical record   - Provide appropriate opportunities for reminiscence   - Provide a consistent daily routine   - Encourage family contact/visitation   Outcome: Progressing  Goal: Complete daily ADLs, including personal hygiene independently, as able  Description: Interventions:  - Observe, teach, and assist patient with ADLS  Outcome: Progressing     Problem: DISCHARGE PLANNING - CARE MANAGEMENT  Goal: Discharge to post-acute care or home with appropriate resources  Description: INTERVENTIONS:  - Conduct assessment to determine patient/family and health care team treatment goals, and need for post-acute services based on payer coverage, community resources, and patient preferences, and barriers to discharge  - Address psychosocial, clinical, and financial barriers to discharge as identified in assessment in conjunction with the patient/family and health care team  - Arrange appropriate level of post-acute services according to patient's   needs and preference and payer coverage in collaboration with the physician and health care team  - Communicate with and update the patient/family, physician, and health care team regarding progress on the discharge plan  - Arrange appropriate transportation to post-acute venues  Outcome: Progressing

## 2024-11-05 NOTE — NURSING NOTE
Pt is calm, cooperative and visible on milieu. Pt reports AH, depression and anxiety; denies SI/HI/VH. Pt interacts appropriately with peers and reports sleeping well. Pt is able to make needs known and is medication compliant. Q 15 min checks maintained.

## 2024-11-05 NOTE — PROGRESS NOTES
11/05/24 0902   Team Meeting   Meeting Type Daily Rounds   Team Members Present   Team Members Present Physician;Nurse;;Other (Discipline and Name)   Physician Team Member Abraham   Nursing Team Member Mohsen   Social Work Team Member Jose J ORTEGA   Other (Discipline and Name) Jeremias Grace MS   Patient/Family Present   Patient Present No   Patient's Family Present No     Groups Participation  9/10.   Expected D/C Date:12/19  Patient's compliant with medications. He's engaged in his treatment. Pending [facility] funding for ACT services to discharge patient home with his family.

## 2024-11-05 NOTE — ASSESSMENT & PLAN NOTE
Reviewed on 11/5/24    Patient reports continued depression and anxiety, as well as symptoms consistent with AH. He notes that the voices threaten to harm him and his family. Patient denies any SI and HI, and denies symptoms consistent with VH. Patient notes persecutory delusions in regards to the voices he is hearing, but he denies delusions of grandiose, referential, or bizarre nature, and does not appear to be responding to internal stimuli.     Continue Clozapine 225 mg qhs for psychosis -- to consider further careful titration for Sxs depending on response  Continue to obtain CBC with diff, CRP, and CK (weekly on Tues), and a Clozapine level and BNP (every 2 weeks on Tues)    Continue Abilify 30 mg qd for psychosis  Continue Escitalopram 20 mg qd for depression and anxiety  Continue Senna-Docusate sodium one 50 mg tablet daily before bed for constipation  - decreased from two tablets daily on 11/3 due to reported loose stools  Continue propanolol 10 mg bid for anxiety   Continue amlodipine 5 mg PO daily     Pt remains on dual antipsychotic Tx due to failure on monotherapy      ACT team referral was made  No associated orders from this encounter found during lookback period of 72 hours.

## 2024-11-05 NOTE — ASSESSMENT & PLAN NOTE
Reviewed on 11/5/24  Ongoing by medical  No associated orders from this encounter found during lookback period of 72 hours.

## 2024-11-05 NOTE — NURSING NOTE
Received pt in bed at change of shift with eyes closed; chest movement noted.  Continues the same thus this far as per q 15 min room checks.   Will continue to monitor behavior, sleeping pattern and any medical issues that may arise.     0542  Sleeping 7+ hrs thus this far      Attempted to obtained his lab scheduled for this am and was unsuccessful.  Quantity not sufficient.

## 2024-11-05 NOTE — NURSING NOTE
Alberto is visible on unit through out the evening. Social with select peers. Compliant with meds and meals. Denies psychiatric symptoms. Q15 min checks maintained. No behavior issues. Nicotine gum at 1725 & 2114

## 2024-11-05 NOTE — ASSESSMENT & PLAN NOTE
Reviewed on 11/5/24  Continue to encourage cessation upon discharge  No associated orders from this encounter found during lookback period of 72 hours.

## 2024-11-06 PROCEDURE — 99232 SBSQ HOSP IP/OBS MODERATE 35: CPT | Performed by: PSYCHIATRY & NEUROLOGY

## 2024-11-06 RX ADMIN — GLYCOPYRROLATE 2 MG: 1 TABLET ORAL at 21:14

## 2024-11-06 RX ADMIN — NICOTINE POLACRILEX 2 MG: 2 GUM, CHEWING ORAL at 21:13

## 2024-11-06 RX ADMIN — AMLODIPINE BESYLATE 5 MG: 5 TABLET ORAL at 08:41

## 2024-11-06 RX ADMIN — ESCITALOPRAM OXALATE 20 MG: 10 TABLET, FILM COATED ORAL at 08:41

## 2024-11-06 RX ADMIN — MULTIPLE VITAMINS W/ MINERALS TAB 1 TABLET: TAB ORAL at 08:42

## 2024-11-06 RX ADMIN — NICOTINE POLACRILEX 2 MG: 2 GUM, CHEWING ORAL at 12:47

## 2024-11-06 RX ADMIN — PROPRANOLOL HYDROCHLORIDE 10 MG: 10 TABLET ORAL at 21:14

## 2024-11-06 RX ADMIN — NICOTINE POLACRILEX 2 MG: 2 GUM, CHEWING ORAL at 08:48

## 2024-11-06 RX ADMIN — MELATONIN TAB 3 MG 9 MG: 3 TAB at 21:14

## 2024-11-06 RX ADMIN — CLOZAPINE 225 MG: 25 TABLET ORAL at 21:14

## 2024-11-06 RX ADMIN — ATROPINE SULFATE 1 DROP: 10 SOLUTION/ DROPS OPHTHALMIC at 21:15

## 2024-11-06 RX ADMIN — PANTOPRAZOLE SODIUM 20 MG: 20 TABLET, DELAYED RELEASE ORAL at 08:41

## 2024-11-06 RX ADMIN — NICOTINE POLACRILEX 2 MG: 2 GUM, CHEWING ORAL at 17:11

## 2024-11-06 RX ADMIN — LORATADINE 10 MG: 10 TABLET ORAL at 08:41

## 2024-11-06 RX ADMIN — ARIPIPRAZOLE 30 MG: 15 TABLET ORAL at 08:39

## 2024-11-06 RX ADMIN — HYDROCHLOROTHIAZIDE 12.5 MG: 12.5 TABLET ORAL at 08:41

## 2024-11-06 RX ADMIN — PROPRANOLOL HYDROCHLORIDE 10 MG: 10 TABLET ORAL at 08:40

## 2024-11-06 RX ADMIN — GLYCOPYRROLATE 1 MG: 1 TABLET ORAL at 08:40

## 2024-11-06 RX ADMIN — GLYCOPYRROLATE 1 MG: 1 TABLET ORAL at 13:18

## 2024-11-06 RX ADMIN — SENNOSIDES AND DOCUSATE SODIUM 1 TABLET: 50; 8.6 TABLET ORAL at 21:14

## 2024-11-06 NOTE — NURSING NOTE
Pt is visible in the milieu and social with select peers. He consumed 100 % of dinner. Took his medications without incidence. Pt is pleasant, polite, and cooperative. Brightens on approach. Pt endorses anxiety and depression and AH of voices telling him they are going to kill him and his family. Denies SI/HI/VH. No unmet needs. Pt attended 9/10 groups. No behavioral issues.

## 2024-11-06 NOTE — CMS CERTIFICATION NOTE
RECERTIFICATION Of Continued Inpatient Care. On or Before The 30th Day  Date Due: 11/06/2024    I certify that inpatient psychiatric hospital services furnished since the previous certification or recertifcation were, and continue to be, medically necessary for either, treatment which could reasonably be expected to improve the patient's condition, diagnostic study and that the hospital records indicate that the services furnished were either intensive treatment services, admission and related services necessary for diagnostic study, or equivalent services. The available community resources are not yet able to support him at this time and further course of action is documented in the individualized treatment plan    I estimate that the additional period of inpatient care will be 30 days or 4 weeks    Bora Rosario MD  11/06/24

## 2024-11-06 NOTE — PROGRESS NOTES
11/06/24 0811   Team Meeting   Meeting Type Daily Rounds   Team Members Present   Team Members Present Physician;Nurse;;Other (Discipline and Name)   Physician Team Member Holter, Thomas   Nursing Team Member Thaddeus   Social Work Team Member Jose J ORTEGA   Other (Discipline and Name) Wang PCM   Patient/Family Present   Patient Present No   Patient's Family Present No     Expected D/C Date:12/19  Groups Participation  8/10.   Patient's compliant with medications. He's engaged in his treatment. He's appropriate. We are waiting for his [facility] funding approval for ACT services, to be discharged home.

## 2024-11-06 NOTE — PLAN OF CARE
Problem: Alteration in Thoughts and Perception  Goal: Treatment Goal: Gain control of psychotic behaviors/thinking, reduce/eliminate presenting symptoms and demonstrate improved reality functioning upon discharge  Outcome: Progressing  Goal: Verbalize thoughts and feelings  Description: Interventions:  - Promote a nonjudgmental and trusting relationship with the patient through active listening and therapeutic communication  - Assess patient's level of functioning, behavior and potential for risk  - Engage patient in 1 on 1 interactions  - Encourage patient to express fears, feelings, frustrations, and discuss symptoms    - [place] patient to reality, help patient recognize reality-based thinking   - Administer medications as ordered and assess for potential side effects  - Provide the patient education related to the signs and symptoms of the illness and desired effects of prescribed medications  Outcome: Progressing  Goal: Refrain from acting on delusional thinking/internal stimuli  Description: Interventions:  - Monitor patient closely, per order   - Utilize least restrictive measures   - Set reasonable limits, give positive feedback for acceptable   - Administer medications as ordered and monitor of potential side effects  Outcome: Progressing  Goal: Agree to be compliant with medication regime, as prescribed and report medication side effects  Description: Interventions:  - Offer appropriate PRN medication and supervise ingestion; conduct AIMS, as needed   Outcome: Progressing  Goal: Attend and participate in unit activities, including therapeutic, recreational, and educational groups  Description: Interventions:  -Encourage Visitation and family involvement in care  Outcome: Progressing  Goal: Recognize dysfunctional thoughts, communicate reality-based thoughts at the time of discharge  Description: Interventions:  - Provide medication and psycho-education to assist patient in compliance and developing  insight into his/her illness   Outcome: Progressing  Goal: Complete daily ADLs, including personal hygiene independently, as able  Description: Interventions:  - Observe, teach, and assist patient with ADLS  - Monitor and promote a balance of rest/activity, with adequate nutrition and elimination   Outcome: Progressing     Problem: Ineffective Coping  Goal: Identifies ineffective coping skills  Outcome: Progressing  Goal: Identifies healthy coping skills  Outcome: Progressing  Goal: Demonstrates healthy coping skills  Outcome: Progressing  Goal: Participates in unit activities  Description: Interventions:  - Provide therapeutic environment   - Provide required programming   - Redirect inappropriate behaviors   Outcome: Progressing     Problem: Depression  Goal: Verbalize thoughts and feelings  Description: Interventions:  - Assess and re-assess patient's level of risk   - Engage patient in 1:1 interactions, daily, for a minimum of 15 minutes   - Encourage patient to express feelings, fears, frustrations, hopes   Outcome: Progressing  Goal: Refrain from harming self  Description: Interventions:  - Monitor patient closely, per order   - Supervise medication ingestion, monitor effects and side effects   Outcome: Progressing  Goal: Refrain from isolation  Description: Interventions:  - Develop a trusting relationship   - Encourage socialization   Outcome: Progressing  Goal: Refrain from self-neglect  Outcome: Progressing  Goal: Attend and participate in unit activities, including therapeutic, recreational, and educational groups  Description: Interventions:  - Provide therapeutic and educational activities daily, encourage attendance and participation, and document same in the medical record   Outcome: Progressing  Goal: Complete daily ADLs, including personal hygiene independently, as able  Description: Interventions:  - Observe, teach, and assist patient with ADLS  -  Monitor and promote a balance of rest/activity,  with adequate nutrition and elimination   Outcome: Progressing     Problem: Anxiety  Goal: Anxiety is at manageable level  Description: Interventions:  - Assess and monitor patient's anxiety level.   - Monitor for signs and symptoms (heart palpitations, chest pain, shortness of breath, headaches, nausea, feeling jumpy, restlessness, irritable, apprehensive).   - Collaborate with interdisciplinary team and initiate plan and interventions as ordered.  - [place] patient to unit/surroundings  - Explain treatment plan  - Encourage participation in care  - Encourage verbalization of concerns/fears  - Identify coping mechanisms  - Assist in developing anxiety-reducing skills  - Administer/offer alternative therapies  - Limit or eliminate stimulants  Outcome: Progressing     Problem: Alteration in Orientation  Goal: Interact with staff daily  Description: Interventions:  - Assess and re-assess patient's level of orientation  - Engage patient in 1 on 1 interactions, daily, for a minimum of 15 minutes   - Establish rapport/trust with patient   Outcome: Progressing  Goal: Allow medical examinations, as recommended  Description: Interventions:  - Provide physical/neurological exams and/or referrals, per provider   Outcome: Progressing  Goal: Cooperate with recommended testing/procedures  Description: Interventions:  - Determine need for ancillary testing  - Observe for mental status changes  - Implement falls/precaution protocol   Outcome: Progressing     Problem: DISCHARGE PLANNING - CARE MANAGEMENT  Goal: Discharge to post-acute care or home with appropriate resources  Description: INTERVENTIONS:  - Conduct assessment to determine patient/family and health care team treatment goals, and need for post-acute services based on payer coverage, community resources, and patient preferences, and barriers to discharge  - Address psychosocial, clinical, and financial barriers to discharge as identified in assessment in conjunction with  the patient/family and health care team  - Arrange appropriate level of post-acute services according to patient's   needs and preference and payer coverage in collaboration with the physician and health care team  - Communicate with and update the patient/family, physician, and health care team regarding progress on the discharge plan  - Arrange appropriate transportation to post-acute venues  Outcome: Progressing     Problem: Depression  Goal: Treatment Goal: Demonstrate behavioral control of depressive symptoms, verbalize feelings of improved mood/affect, and adopt new coping skills prior to discharge  Outcome: Not Progressing

## 2024-11-06 NOTE — PROGRESS NOTES
Progress Note - Behavioral Health   Name: Alberto Berumen 29 y.o. male I MRN: [id number]  Unit/Bed#: [facility] 112-02 I Date of Admission: 3/29/2024   Date of Service: 11/6/2024 I Hospital Day: 222     Assessment & Plan  Schizoaffective disorder, bipolar type (HCC)  Reviewed on 11/624  Continues to present with same voices as before that are threatening to kill him and his family when he gets out but he has been hearing them for more than 2 years and he is trying to learn to live with them.  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  Continue Escitalopram 20 mg qd for depression and anxiety  Continue Senna-Docusate sodium one 50 mg tablet daily before bed for constipation  - decreased from two tablets daily on 11/3 due to reported loose stools  Continue propanolol 10 mg bid for anxiety   Individual group milieu treatment  Pt remains on dual antipsychotic Tx due to failure on monotherapy      ACT team referral was made but waiting for [facility] to support ACT team support as his medical assistance benefits are no longer available  No associated orders from this encounter found during lookback period of 72 hours.    Chronic idiopathic constipation  Reviewed on 11/6/24  Follows with medical  Continue senna-docusate sodium dose two tablets before bed per medical team.   - Patient dose was decreased to one tablet nightly on 11/3 due to reports of loose stools. Patient intends to discuss changing this back to two tablets today (11/5) with nursing staff.    No associated orders from this encounter found during lookback period of 72 hours.  GERD (gastroesophageal reflux disease)  Reviewed on 11/6/24  Follows with medical    Continue pantoprazole EC 20 mg tablet every morning per medical team  No associated orders from this encounter found during lookback period of 72 hours.  Medical clearance for psychiatric admission  Reviewed on 11/5/24  Ongoing by medical  No associated orders from this encounter found during lookback period of 72 hours.    Tobacco abuse  Reviewed on 11/5/24  Continue to encourage cessation upon discharge  No associated orders from this encounter found during lookback period of 72 hours.  Elevated hemoglobin A1c  Reviewed on 11/5/24  Follows with medical  No associated orders from this encounter found during lookback period of 72 hours.    Tobacco abuse  Reviewed on 11/6/2024    Continue to encourage cessation upon discharge  Class 2 obesity in adult  Reviewed on 11/6/2024 with dietary counseling  BMI 38.66  Primary hypertension  Reviewed on 11/6/2024 followed up by medical  On Norvasc 5 mg once a day and hydrochlorothiazide 12.5 mg once a day      Patient Active Problem List   Diagnosis    GERD (gastroesophageal reflux disease)    Medical clearance for psychiatric admission    Schizoaffective disorder, bipolar type (HCC)    Tobacco abuse    T wave inversion in EKG    Syringoma    Chronic idiopathic constipation    Vitamin B 12 deficiency    Vitamin D deficiency    Confluent and reticulate papillomatosis    Class 2 obesity in adult    Primary hypertension    Elevated hemoglobin A1c    Bilateral lower extremity edema     Review of systems: Unremarkable   assessment  Overall Status: Same threatening voices as before   certification Statement: The patient will continue to require additional inpatient hospital stay due to ongoing voices that are threatening to mixing lack of response to medications and ECT until eligible for an ACT team to live with  his aunt.     Medications:  propranolol 10 mg every 12 hours as as needed for anxiety, Abilify 30 mg mg at bedtime Lexapro 20 mg once a day,  and senna 2 tablets twice a day for constipation clozapine 225 mg at bedtime  All medications reviewed  side effects from treatment: None reported   Medication changes   None today  Medication education  Risks side effects benefits and precautions of medications discussed with patient and he did verbalize an understanding about risks for metabolic syndrome from being on neuroleptics and risk for tardive dyskinesia etc.   Understanding of medications: Has some understanding  Justification for dual anti-psychotics: Not applicable    Non-pharmacological treatments  Continue with individual, group, milieu and occupational therapy using recovery principles and psycho-education about accepting illness and the need for treatment.   to contact aunt and explore ACT team referral which he never had  ECT to be continued  q 2 weekly for maintenance x 6  Refuses to see a dietician and agrees to do more exercises as he is about 100 lbs overweight   Prolactin level high so Risperdal replaced with Abilify which he has tolerated well so far with prolactin level coming back to normal    Safety  Safety and communication plan established to target dynamic risk factors discussed above.    Discharge Plan back to his aunt when [facility] starts funding for ACT program as his medical assistance benefits are no longer available since he is on Medicare  Interval Progress   Doing fairly better tolerating the reintroduction of Clozapine up to 225 mg a day without any increase in CK so far.  Still reporting voices that are threatening at times and is drinking fluids.  Still reports some sadness over the voices that are not going away but not reporting any thoughts of suicide.  Not aggressive or agitated with no need for behavioral PRNs.  Has tolerated discharge of a female peer that he used to  hang around with from over 2 weeks ago.  No bouts of aggression or agitation or threatening behaviors or self-abusive behaviors with no need for behavioral PRNs in the last 24 hours.  He skip some meals as he wants to lose weight    acceptance by patient: Accepting  Hopefulness in recovery: Living with his aunt  Involved in reintegration process: Talking with his aunt in the [place] and meeting with aunt and sister and other relatives on the unit  trusting in relationship with psychiatrist: Trusting  Sleep: Good  Appetite: Improving  Compliance with Medications: Good  Group attendance: Attending some groups 9/10  Significant events and progress towards goals: No need for behavioral PRNs and tolerating the increase in clozapine rechallenge    Mental Status Exam  Appearance: age appropriate, improved grooming, looks older than stated age, overweight, with hair in dreadlocks casually dressed with a clean shaven face, fairly groomed with good eye contact pacing the hallway wide awake and alert again dressed in hospital gown  behavior: Friendly cooperative with no good eye contact  speech: normal rate, normal volume, normal pitch  Mood: dysphoric, anxious, continues to report feeling good  continues to report some sadness over ongoing presence of voices that are threatening   affect: constricted, inappropriate, mood-congruent.  Constricted with minimal mood reactivity   thought Process: organized, logical, coherent, goal directed, linear, decreased rate of thoughts, slowing of thoughts, negative thinking, impaired abstract reasoning, concrete  Thought Content: paranoid ideation, some paranoia, grandiose ideas, intrusive thoughts, preoccupied, chronic, continues to report paranoia about people threatening to kill him and his family because of the voices.  He believes ECT has helped so that he is not much in his head.  No other delusions elicited.  No current suicidal or homicidal thoughts and no plans verbalized but today  reports having passive death wishes because of voices with no plans and able to CFS and it has not changed yet  .  No phobias obsessions compulsions or distorted body perceptions reported.   Perceptual Disturbances: Reporting voices that are threatening to kill him and his family but does not appear responding  Hx Risk Factors: chronic psychiatric problems, chronic anxiety symptoms, chronic psychotic symptoms,    Sensorium: Oriented x 3 spheres and situation  cognition: recent and remote memory grossly intact  Consciousness: alert and awake  Attention: Attention and concentration span good  Intellect: appears to be of average intelligence  Insight: intact  Judgement: intact  Motor Activity: no abnormal movements     Vitals  Temp:  [97.5 °F (36.4 °C)-97.6 °F (36.4 °C)] 97.5 °F (36.4 °C)  HR:  [84-92] 90  Resp:  [20] 20  BP: (129-148)/(68-88) 148/88  SpO2:  [99 %] 99 %  No intake or output data in the 24 hours ending 11/06/24 0509      Lab Results: All labs reviewed and prolactin level came down to normal on 8/1/2024, clozapine level 820 on 9/5/24  Current Facility-Administered Medications   Medication Dose Route Frequency Provider Last Rate    acetaminophen  650 mg Oral Q4H PRN Jordan C Holter, DO      acetaminophen  650 mg Oral Q6H PRN HOLLI Lion      aluminum-magnesium hydroxide-simethicone  30 mL Oral Q4H PRN Jordan C Holter,       amLODIPine  5 mg Oral Daily HOLLI Lion      ARIPiprazole  30 mg Oral Daily Bora Rosario MD      Artificial Tears  1 drop Both Eyes Q3H PRN Jordan C Holter, DO      atropine  1 drop Sublingual  Harjeet Torres,       haloperidol lactate  2.5 mg Intramuscular Q4H PRN Max 4/day HOLLI Lion      And    LORazepam  1 mg Intramuscular Q4H PRN Max 4/day HOLLI iLon      And    benztropine  0.5 mg Intramuscular Q4H PRN Max 4/day HOLLI Lion      benztropine  1 mg Intramuscular Q4H PRN Max 6/day Jordan C Holter, DO      haloperidol lactate  5 mg  Intramuscular Q4H PRN Max 4/day HOLLI Lion      And    LORazepam  2 mg Intramuscular Q4H PRN Max 4/day HOLLI Lion      And    benztropine  1 mg Intramuscular Q4H PRN Max 4/day HOLLI Lion      benztropine  1 mg Oral Q4H PRN Max 6/day HOLLI Lion      benztropine  1 mg Oral Q4H PRN Max 6/day Ion FISCHER Holter, DO      bisacodyl  10 mg Rectal Daily PRN HOLLI Lion      calcium carbonate  500 mg Oral BID PRN HOLLI Monge      cloZAPine  225 mg Oral HS Hardik So MD      hydrOXYzine HCL  50 mg Oral Q6H PRN Max 4/day [facility] Holter, DO      Or    diphenhydrAMINE  50 mg Intramuscular Q6H PRN Ion  Holter, DO      hydrOXYzine HCL  50 mg Oral Q6H PRN Max 4/day HOLLI Lion      Or    diphenhydrAMINE  50 mg Intramuscular Q6H PRN HOLLI Lion      diphenhydrAMINE-zinc acetate   Topical BID PRN HOLLI Lion      escitalopram  20 mg Oral Daily HOLLI Lion      famotidine  20 mg Oral BID PRN HOLLI Monge      fluticasone  1 spray Each Nare Daily PRN HOLLI Monge      glycopyrrolate  1 mg Oral BID (AM & Afternoon) Bora Rosario MD      glycopyrrolate  2 mg Oral HS Bora Rosario MD      haloperidol  1 mg Oral Q6H PRN HOLLI Lion      haloperidol  2.5 mg Oral Q4H PRN Max 4/day HOLLI Lion      haloperidol  5 mg Oral Q4H PRN Max 4/day HOLLI Lion      hydroCHLOROthiazide  12.5 mg Oral Daily HOLLI Galvan      hydrocortisone   Topical 4x Daily PRN HOLLI Lion      hydrOXYzine HCL  100 mg Oral Q6H PRN Max 4/day Ion Dupontter, DO      Or    LORazepam  2 mg Intramuscular Q6H PRN Ion Dupontter, DO      hydrOXYzine HCL  100 mg Oral Q6H PRN Max 4/day HOLLI Lion      Or    LORazepam  2 mg Intramuscular Q6H PRN HOLLI Lion      hydrOXYzine HCL  25 mg Oral Q6H PRN Max 4/day Jordan C Holter, DO      ibuprofen  600 mg Oral Q8H PRN HOLLI Lion       influenza vaccine  0.5 mL Intramuscular Once Bora Rosario MD      loratadine  10 mg Oral Daily Brina Guillen MD      melatonin  3 mg Oral HS PRN Bora Rosario MD      melatonin  9 mg Oral HS Bora Rosario MD      methocarbamol  500 mg Oral Q6H PRN HOLLI Lion      multivitamin-minerals  1 tablet Oral Daily Eileen CherryHOLLI Jimenez      nicotine polacrilex  2 mg Oral Q4H PRN Bora Rosario MD      OLANZapine  5 mg Oral Q4H PRN Max 3/day [facility] Holter, DO      Or    OLANZapine  2.5 mg Intramuscular Q4H PRN Max 3/day [facility] Holter, DO      OLANZapine  5 mg Oral Q3H PRN Max 3/day [facility] Holter, DO      Or    OLANZapine  5 mg Intramuscular Q3H PRN Max 3/day [facility] Holter, DO      OLANZapine  2.5 mg Oral Q4H PRN Max 6/day [facility] Holter, DO      ondansetron  4 mg Oral Q6H PRN HOLLI Lion      pantoprazole  20 mg Oral QAM [facility] Holter, DO      polyethylene glycol  17 g Oral Daily PRN HOLLI Ghotra      propranolol  10 mg Oral Q12H KAYLIE HOLLI Lion      senna-docusate sodium  1 tablet Oral Daily PRN [facility] Holter, DO      senna-docusate sodium  1 tablet Oral HS HOLLI Monge      traZODone  50 mg Oral HS PRN HOLLI Lion      white petrolatum-mineral oil   Topical TID PRN HOLLI Lion         Counseling / Coordination of Care: Total floor / unit time spent today 15 minutes. Greater than 50% of total time was spent with the patient and / or family counseling and / or somewhat receptive to supportive listening and teaching positive coping skills to deal with symptom mangement.     Patient's Rights, confidentiality and exceptions to confidentiality, use of automated medical record, Behavioral Health Services staff access to medical record, and consent to treatment reviewed.    This note has been dictated and hence there may be problems with punctuation, spelling and formatting and if anyone has any concerns please address them to Dr. Rosario   This note is  not shared with patient due to potential for making patient's condition worse by knowing the content of the note.

## 2024-11-07 PROCEDURE — 99232 SBSQ HOSP IP/OBS MODERATE 35: CPT | Performed by: PSYCHIATRY & NEUROLOGY

## 2024-11-07 RX ADMIN — NICOTINE POLACRILEX 2 MG: 2 GUM, CHEWING ORAL at 21:13

## 2024-11-07 RX ADMIN — GLYCOPYRROLATE 1 MG: 1 TABLET ORAL at 08:23

## 2024-11-07 RX ADMIN — HYDROCHLOROTHIAZIDE 12.5 MG: 12.5 TABLET ORAL at 08:23

## 2024-11-07 RX ADMIN — GLYCOPYRROLATE 1 MG: 1 TABLET ORAL at 13:12

## 2024-11-07 RX ADMIN — PROPRANOLOL HYDROCHLORIDE 10 MG: 10 TABLET ORAL at 08:23

## 2024-11-07 RX ADMIN — SENNOSIDES AND DOCUSATE SODIUM 1 TABLET: 50; 8.6 TABLET ORAL at 21:13

## 2024-11-07 RX ADMIN — ESCITALOPRAM OXALATE 20 MG: 10 TABLET, FILM COATED ORAL at 08:24

## 2024-11-07 RX ADMIN — NICOTINE POLACRILEX 2 MG: 2 GUM, CHEWING ORAL at 08:25

## 2024-11-07 RX ADMIN — LORATADINE 10 MG: 10 TABLET ORAL at 08:23

## 2024-11-07 RX ADMIN — ARIPIPRAZOLE 30 MG: 15 TABLET ORAL at 08:25

## 2024-11-07 RX ADMIN — PANTOPRAZOLE SODIUM 20 MG: 20 TABLET, DELAYED RELEASE ORAL at 08:23

## 2024-11-07 RX ADMIN — GLYCOPYRROLATE 2 MG: 1 TABLET ORAL at 21:10

## 2024-11-07 RX ADMIN — PROPRANOLOL HYDROCHLORIDE 10 MG: 10 TABLET ORAL at 21:13

## 2024-11-07 RX ADMIN — MULTIPLE VITAMINS W/ MINERALS TAB 1 TABLET: TAB ORAL at 08:24

## 2024-11-07 RX ADMIN — MELATONIN TAB 3 MG 9 MG: 3 TAB at 21:11

## 2024-11-07 RX ADMIN — ATROPINE SULFATE 1 DROP: 10 SOLUTION/ DROPS OPHTHALMIC at 21:14

## 2024-11-07 RX ADMIN — AMLODIPINE BESYLATE 5 MG: 5 TABLET ORAL at 08:22

## 2024-11-07 RX ADMIN — NICOTINE POLACRILEX 2 MG: 2 GUM, CHEWING ORAL at 17:34

## 2024-11-07 RX ADMIN — CLOZAPINE 225 MG: 25 TABLET ORAL at 21:10

## 2024-11-07 RX ADMIN — NICOTINE POLACRILEX 2 MG: 2 GUM, CHEWING ORAL at 12:29

## 2024-11-07 NOTE — NURSING NOTE
Pt is calm, cooperative and visible on milieu. Pt reports AH, depression and anxiety; denies SI/HI/VH. Compliant with medications and meals. Social with staff and peers. No behavioral issues. Q 15 min checks maintained.

## 2024-11-07 NOTE — NURSING NOTE
"Alberto is visible on unit,  Pleasant & cooperative. Social with select peers. Compliant with meds and meals. Denies SI/HI. Q15 min checks maintained. Nicorette gum at 1711.   Continues to \"hear voices\" telling him \"to kill himself\"  Nicorette gum at 2113  "

## 2024-11-07 NOTE — PLAN OF CARE
Problem: Alteration in Thoughts and Perception  Goal: Verbalize thoughts and feelings  Description: Interventions:  - Promote a nonjudgmental and trusting relationship with the patient through active listening and therapeutic communication  - Assess patient's level of functioning, behavior and potential for risk  - Engage patient in 1 on 1 interactions  - Encourage patient to express fears, feelings, frustrations, and discuss symptoms    - [place] patient to reality, help patient recognize reality-based thinking   - Administer medications as ordered and assess for potential side effects  - Provide the patient education related to the signs and symptoms of the illness and desired effects of prescribed medications  Outcome: Progressing  Goal: Refrain from acting on delusional thinking/internal stimuli  Description: Interventions:  - Monitor patient closely, per order   - Utilize least restrictive measures   - Set reasonable limits, give positive feedback for acceptable   - Administer medications as ordered and monitor of potential side effects  Outcome: Progressing  Goal: Agree to be compliant with medication regime, as prescribed and report medication side effects  Description: Interventions:  - Offer appropriate PRN medication and supervise ingestion; conduct AIMS, as needed   Outcome: Progressing  Goal: Attend and participate in unit activities, including therapeutic, recreational, and educational groups  Description: Interventions:  -Encourage Visitation and family involvement in care  Outcome: Progressing  Goal: Recognize dysfunctional thoughts, communicate reality-based thoughts at the time of discharge  Description: Interventions:  - Provide medication and psycho-education to assist patient in compliance and developing insight into his/her illness   Outcome: Progressing  Goal: Complete daily ADLs, including personal hygiene independently, as able  Description: Interventions:  - Observe, teach, and assist  patient with ADLS  - Monitor and promote a balance of rest/activity, with adequate nutrition and elimination   Outcome: Progressing     Problem: Ineffective Coping  Goal: Identifies ineffective coping skills  Outcome: Progressing  Goal: Identifies healthy coping skills  Outcome: Progressing  Goal: Demonstrates healthy coping skills  Outcome: Progressing     Problem: Depression  Goal: Treatment Goal: Demonstrate behavioral control of depressive symptoms, verbalize feelings of improved mood/affect, and adopt new coping skills prior to discharge  Outcome: Progressing  Goal: Refrain from isolation  Description: Interventions:  - Develop a trusting relationship   - Encourage socialization   Outcome: Progressing     Problem: Anxiety  Goal: Anxiety is at manageable level  Description: Interventions:  - Assess and monitor patient's anxiety level.   - Monitor for signs and symptoms (heart palpitations, chest pain, shortness of breath, headaches, nausea, feeling jumpy, restlessness, irritable, apprehensive).   - Collaborate with interdisciplinary team and initiate plan and interventions as ordered.  - [place] patient to unit/surroundings  - Explain treatment plan  - Encourage participation in care  - Encourage verbalization of concerns/fears  - Identify coping mechanisms  - Assist in developing anxiety-reducing skills  - Administer/offer alternative therapies  - Limit or eliminate stimulants  Outcome: Progressing     Problem: Alteration in Orientation  Goal: Interact with staff daily  Description: Interventions:  - Assess and re-assess patient's level of orientation  - Engage patient in 1 on 1 interactions, daily, for a minimum of 15 minutes   - Establish rapport/trust with patient   Outcome: Progressing     Problem: DISCHARGE PLANNING - CARE MANAGEMENT  Goal: Discharge to post-acute care or home with appropriate resources  Description: INTERVENTIONS:  - Conduct assessment to determine patient/family and health care team  treatment goals, and need for post-acute services based on payer coverage, community resources, and patient preferences, and barriers to discharge  - Address psychosocial, clinical, and financial barriers to discharge as identified in assessment in conjunction with the patient/family and health care team  - Arrange appropriate level of post-acute services according to patient's   needs and preference and payer coverage in collaboration with the physician and health care team  - Communicate with and update the patient/family, physician, and health care team regarding progress on the discharge plan  - Arrange appropriate transportation to post-acute venues  Outcome: Progressing     Problem: Alteration in Thoughts and Perception  Goal: Treatment Goal: Gain control of psychotic behaviors/thinking, reduce/eliminate presenting symptoms and demonstrate improved reality functioning upon discharge  Outcome: Not Progressing

## 2024-11-07 NOTE — PROGRESS NOTES
11/07/24 0816   Team Meeting   Meeting Type Daily Rounds   Team Members Present   Team Members Present Physician;Nurse;;Other (Discipline and Name)   Physician Team Member Holter, Spoleti   Nursing Team Member Claudia   Social Work Team Member Jose J ORTEGA   Other (Discipline and Name) Jeremias Grace   Patient/Family Present   Patient Present No   Patient's Family Present No     Groups Participation  9/11.   Expected D/C Date:12/14  Patient's compliant with medications. He's appropriate and engaged in his treatment. D/C to home when [facility] funding received for ACT services.

## 2024-11-08 PROCEDURE — 99232 SBSQ HOSP IP/OBS MODERATE 35: CPT | Performed by: PSYCHIATRY & NEUROLOGY

## 2024-11-08 RX ADMIN — MELATONIN TAB 3 MG 9 MG: 3 TAB at 21:02

## 2024-11-08 RX ADMIN — HYDROCHLOROTHIAZIDE 12.5 MG: 12.5 TABLET ORAL at 08:00

## 2024-11-08 RX ADMIN — PROPRANOLOL HYDROCHLORIDE 10 MG: 10 TABLET ORAL at 08:01

## 2024-11-08 RX ADMIN — NICOTINE POLACRILEX 2 MG: 2 GUM, CHEWING ORAL at 08:02

## 2024-11-08 RX ADMIN — PROPRANOLOL HYDROCHLORIDE 10 MG: 10 TABLET ORAL at 21:02

## 2024-11-08 RX ADMIN — ESCITALOPRAM OXALATE 20 MG: 10 TABLET, FILM COATED ORAL at 08:01

## 2024-11-08 RX ADMIN — AMLODIPINE BESYLATE 5 MG: 5 TABLET ORAL at 08:00

## 2024-11-08 RX ADMIN — MULTIPLE VITAMINS W/ MINERALS TAB 1 TABLET: TAB ORAL at 08:01

## 2024-11-08 RX ADMIN — GLYCOPYRROLATE 2 MG: 1 TABLET ORAL at 21:03

## 2024-11-08 RX ADMIN — ARIPIPRAZOLE 30 MG: 15 TABLET ORAL at 08:01

## 2024-11-08 RX ADMIN — PANTOPRAZOLE SODIUM 20 MG: 20 TABLET, DELAYED RELEASE ORAL at 08:01

## 2024-11-08 RX ADMIN — NICOTINE POLACRILEX 2 MG: 2 GUM, CHEWING ORAL at 12:49

## 2024-11-08 RX ADMIN — NICOTINE POLACRILEX 2 MG: 2 GUM, CHEWING ORAL at 21:03

## 2024-11-08 RX ADMIN — ATROPINE SULFATE 1 DROP: 10 SOLUTION/ DROPS OPHTHALMIC at 21:04

## 2024-11-08 RX ADMIN — CLOZAPINE 225 MG: 25 TABLET ORAL at 21:03

## 2024-11-08 RX ADMIN — GLYCOPYRROLATE 1 MG: 1 TABLET ORAL at 08:02

## 2024-11-08 RX ADMIN — GLYCOPYRROLATE 1 MG: 1 TABLET ORAL at 13:24

## 2024-11-08 RX ADMIN — LORATADINE 10 MG: 10 TABLET ORAL at 08:01

## 2024-11-08 RX ADMIN — NICOTINE POLACRILEX 2 MG: 2 GUM, CHEWING ORAL at 17:07

## 2024-11-08 NOTE — PLAN OF CARE
Problem: Ineffective Coping  Goal: Identifies ineffective coping skills  Outcome: Progressing  Goal: Identifies healthy coping skills  Outcome: Progressing  Goal: Demonstrates healthy coping skills  Outcome: Progressing  Goal: Participates in unit activities  Description: Interventions:  - Provide therapeutic environment   - Provide required programming   - Redirect inappropriate behaviors   Outcome: Progressing  Alberto has made progressed towards the goals as evidenced by attending groups and actively engaging in them.

## 2024-11-08 NOTE — PLAN OF CARE
Problem: Alteration in Thoughts and Perception  Goal: Treatment Goal: Gain control of psychotic behaviors/thinking, reduce/eliminate presenting symptoms and demonstrate improved reality functioning upon discharge  Outcome: Progressing  Goal: Verbalize thoughts and feelings  Description: Interventions:  - Promote a nonjudgmental and trusting relationship with the patient through active listening and therapeutic communication  - Assess patient's level of functioning, behavior and potential for risk  - Engage patient in 1 on 1 interactions  - Encourage patient to express fears, feelings, frustrations, and discuss symptoms    - [place] patient to reality, help patient recognize reality-based thinking   - Administer medications as ordered and assess for potential side effects  - Provide the patient education related to the signs and symptoms of the illness and desired effects of prescribed medications  Outcome: Progressing  Goal: Refrain from acting on delusional thinking/internal stimuli  Description: Interventions:  - Monitor patient closely, per order   - Utilize least restrictive measures   - Set reasonable limits, give positive feedback for acceptable   - Administer medications as ordered and monitor of potential side effects  Outcome: Progressing  Goal: Agree to be compliant with medication regime, as prescribed and report medication side effects  Description: Interventions:  - Offer appropriate PRN medication and supervise ingestion; conduct AIMS, as needed   Outcome: Progressing  Goal: Attend and participate in unit activities, including therapeutic, recreational, and educational groups  Description: Interventions:  -Encourage Visitation and family involvement in care  Outcome: Progressing  Goal: Recognize dysfunctional thoughts, communicate reality-based thoughts at the time of discharge  Description: Interventions:  - Provide medication and psycho-education to assist patient in compliance and developing  insight into his/her illness   Outcome: Progressing  Goal: Complete daily ADLs, including personal hygiene independently, as able  Description: Interventions:  - Observe, teach, and assist patient with ADLS  - Monitor and promote a balance of rest/activity, with adequate nutrition and elimination   Outcome: Progressing     Problem: Ineffective Coping  Goal: Participates in unit activities  Description: Interventions:  - Provide therapeutic environment   - Provide required programming   - Redirect inappropriate behaviors   Outcome: Progressing  Goal: Patient/Family participate in treatment and DC plans  Description: Interventions:  - Provide therapeutic environment  Outcome: Progressing  Goal: Patient/Family verbalizes awareness of resources  Outcome: Progressing     Problem: Depression  Goal: Treatment Goal: Demonstrate behavioral control of depressive symptoms, verbalize feelings of improved mood/affect, and adopt new coping skills prior to discharge  Outcome: Progressing  Goal: Verbalize thoughts and feelings  Description: Interventions:  - Assess and re-assess patient's level of risk   - Engage patient in 1:1 interactions, daily, for a minimum of 15 minutes   - Encourage patient to express feelings, fears, frustrations, hopes   Outcome: Progressing  Goal: Refrain from harming self  Description: Interventions:  - Monitor patient closely, per order   - Supervise medication ingestion, monitor effects and side effects   Outcome: Progressing  Goal: Refrain from isolation  Description: Interventions:  - Develop a trusting relationship   - Encourage socialization   Outcome: Progressing  Goal: Refrain from self-neglect  Outcome: Progressing  Goal: Attend and participate in unit activities, including therapeutic, recreational, and educational groups  Description: Interventions:  - Provide therapeutic and educational activities daily, encourage attendance and participation, and document same in the medical record    Outcome: Progressing  Goal: Complete daily ADLs, including personal hygiene independently, as able  Description: Interventions:  - Observe, teach, and assist patient with ADLS  -  Monitor and promote a balance of rest/activity, with adequate nutrition and elimination   Outcome: Progressing     Problem: Anxiety  Goal: Anxiety is at manageable level  Description: Interventions:  - Assess and monitor patient's anxiety level.   - Monitor for signs and symptoms (heart palpitations, chest pain, shortness of breath, headaches, nausea, feeling jumpy, restlessness, irritable, apprehensive).   - Collaborate with interdisciplinary team and initiate plan and interventions as ordered.  - [place] patient to unit/surroundings  - Explain treatment plan  - Encourage participation in care  - Encourage verbalization of concerns/fears  - Identify coping mechanisms  - Assist in developing anxiety-reducing skills  - Administer/offer alternative therapies  - Limit or eliminate stimulants  Outcome: Progressing     Problem: Alteration in Orientation  Goal: Treatment Goal: Demonstrate a reduction of confusion and improved orientation to person, place, time and/or situation upon discharge, according to optimum baseline/ability  Outcome: Progressing  Goal: Interact with staff daily  Description: Interventions:  - Assess and re-assess patient's level of orientation  - Engage patient in 1 on 1 interactions, daily, for a minimum of 15 minutes   - Establish rapport/trust with patient   Outcome: Progressing  Goal: Attend and participate in unit activities, including therapeutic, recreational, and educational groups  Description: Interventions:  - Provide therapeutic and educational activities daily, encourage attendance and participation, and document same in the medical record   - Provide appropriate opportunities for reminiscence   - Provide a consistent daily routine   - Encourage family contact/visitation   Outcome: Progressing  Goal: Complete  daily ADLs, including personal hygiene independently, as able  Description: Interventions:  - Observe, teach, and assist patient with ADLS  Outcome: Progressing     Problem: DISCHARGE PLANNING - CARE MANAGEMENT  Goal: Discharge to post-acute care or home with appropriate resources  Description: INTERVENTIONS:  - Conduct assessment to determine patient/family and health care team treatment goals, and need for post-acute services based on payer coverage, community resources, and patient preferences, and barriers to discharge  - Address psychosocial, clinical, and financial barriers to discharge as identified in assessment in conjunction with the patient/family and health care team  - Arrange appropriate level of post-acute services according to patient's   needs and preference and payer coverage in collaboration with the physician and health care team  - Communicate with and update the patient/family, physician, and health care team regarding progress on the discharge plan  - Arrange appropriate transportation to post-acute venues  Outcome: Progressing

## 2024-11-08 NOTE — PROGRESS NOTES
Inpatient Behavioral Health Safety/Relapse Prevention Plan with the following results:    My strengths and things worth living for include: mom, family, myself    Triggers, stressors, and situations that place me at risk for a crisis situation are:  []Things people say to me  []Things people do  []Losses  [x]My own negative thoughts  []My own behaviors  [x]Being alone  []Loneliness   [x]Preoccupation with the past  []Perceived failure  []Sleep problems  []Work or School  [x]Things I am worried about  []Substance abuse  []Financial problems  []Physical problems    Warning Signs (thoughts, image, feelings and behaviors) that indicate I may need help: Loud Noises    Coping Skills, I can use/things that help me calm down and keep me safe  [x]Taking my medication appropriately  [x]Deep Breathing  [x]Exercise  [x]Journaling  []Keeping my mental health appointments  [x]Talking with supports  []Crafts/Arts  [x]Reading  [x]Taking a walk break  [x]Helping others  [x]Music  [x]Spirituality  []Volunteering   [x]Mindfulness   []Keeping a daily schedule for structure/purpose  [x]Hot shower or bath   []Support groups    Family, friends and organizations I can call for support: Sister, Aunt, Brother

## 2024-11-08 NOTE — PROGRESS NOTES
11/08/24 0801   Team Meeting   Meeting Type Daily Rounds   Team Members Present   Team Members Present Physician;Nurse;;Other (Discipline and Name)   Physician Team Member Holter, Thomas   Nursing Team Member Claudia   Social Work Team Member Jose J ORTEGA   Other (Discipline and Name) [facility] PCM   Patient/Family Present   Patient Present No   Patient's Family Present No     Groups Participation  /10.   Expected D/C Date:12/19    He's compliant with medications. He's engaged in his treatment. He's appropriate and socializes with select peers. Pending discharge home with ACT services once patient is approved for [facility] funding.

## 2024-11-08 NOTE — PROGRESS NOTES
Progress Note - Behavioral Health   Name: Alberto Berumen 29 y.o. male I MRN: [id number]  Unit/Bed#: [facility] 112-02 I Date of Admission: 3/29/2024   Date of Service: 11/9/2024 I Hospital Day: 225     Assessment & Plan  Schizoaffective disorder, bipolar type (HCC)  Reviewed on 11/5/24    Patient reports continued depression and anxiety, as well as symptoms consistent with AH. He notes that the voices threaten to harm him and his family. Patient denies any SI and HI, and denies symptoms consistent with VH. Patient notes persecutory delusions in regards to the voices he is hearing, but he denies delusions of grandiose, referential, or bizarre nature, and does not appear to be responding to internal stimuli.     Continue Clozapine 225 mg qhs for psychosis -- to consider further careful titration for Sxs depending on response  Continue to obtain CBC with diff, CRP, and CK (weekly on Tues), and a Clozapine level and BNP (every 2 weeks on Tues)    Continue Abilify 30 mg qd for psychosis  Continue Escitalopram 20 mg qd for depression and anxiety  Continue Senna-Docusate sodium one 50 mg tablet daily before bed for constipation  - decreased from two tablets daily on 11/3 due to reported loose stools  Continue propanolol 10 mg bid for anxiety   Continue amlodipine 5 mg PO daily     Pt remains on dual antipsychotic Tx due to failure on monotherapy      ACT team referral was made  No associated orders from this encounter found during lookback period of 72 hours.    Chronic idiopathic constipation  Reviewed on 11/5/24  Follows with medical  Continue senna-docusate sodium dose two tablets before bed per medical team.   - Patient dose was decreased to one tablet nightly on 11/3 due to reports of loose stools. Patient intends to discuss changing this back to two tablets today (11/5) with nursing staff.   No associated orders from this encounter found during lookback period of 72 hours.  GERD (gastroesophageal reflux  disease)  Reviewed on 11/5/24  Follows with medical  Continue famotidine 20 mg tablet BID per medical team   Continue glycopyrrolate 1 mg tablet BID AM and afternoon per medical team  Continue glycopyrrolate 2 mg tablet before bed per medical team  Continue pantoprazole EC 20 mg tablet every morning per medical team  No associated orders from this encounter found during lookback period of 72 hours.  Medical clearance for psychiatric admission  Reviewed on 11/5/24  Ongoing by medical  No associated orders from this encounter found during lookback period of 72 hours.    Tobacco abuse  Reviewed on 11/5/24  Continue to encourage cessation upon discharge  No associated orders from this encounter found during lookback period of 72 hours.  Elevated hemoglobin A1c  Reviewed on 11/5/24  Follows with medical  No associated orders from this encounter found during lookback period of 72 hours.  Class 2 obesity in adult    No associated orders from this encounter found during lookback period of 72 hours.    Primary hypertension    No associated orders from this encounter found during lookback period of 72 hours.    Progress Toward Goals: progressing    Recommended Treatment: Continue with group therapy, milieu therapy and occupational therapy.      Risks, benefits and possible side effects of Medications:   Risks, benefits, and possible side effects of medications explained to patient and patient verbalizes understanding.      History of Present Illness   Behavior over the last 24 hours:  unchanged  Sleep: normal  Appetite: normal  Medication side effects: Yes some tiredness  ROS: no complaints    Subjective: Per nursing, patient visible in milieu, reporting AH, minimal peer interactions, medication compliant. On evaluation today, Alberto describes mood as tired and depressed. He slept well overnight. He denies any acute issues. He denies SI/HI. He reports ongoing AH. He denies command hallucinations. No VH. He reports some  tiredness from medications but otherwise denies medication side effects.     Objective   Mental Status Evaluation:  Appearance:  casually dressed and older than stated age   Behavior:  Cooperative, in bed, poor eye contact   Speech:  normal pitch and normal volume   Mood:  depressed   Affect:  mood-congruent   Thought Process:  goal directed   Associations: intact associations   Thought Content:  delusions  persecutory   Perceptual Disturbances: Auditory hallucinations without commands   Risk Potential: Suicidal Ideations none  Homicidal Ideations none  Potential for Aggression No   Sensorium:  person, place, time/date, and situation   Memory:  recent and remote memory grossly intact   Consciousness:  alert and awake    Attention: attention span appeared shorter than expected for age   Insight:  limited   Judgment: limited   Gait/Station: In bed, not observed   Motor Activity: no abnormal movements     Medications: all current active meds have been reviewed, continue current psychiatric medications, and current meds:   Current Facility-Administered Medications:     acetaminophen (TYLENOL) tablet 650 mg, Q4H PRN    acetaminophen (TYLENOL) tablet 650 mg, Q6H PRN    aluminum-magnesium hydroxide-simethicone (MAALOX) oral suspension 30 mL, Q4H PRN    amLODIPine (NORVASC) tablet 5 mg, Daily    ARIPiprazole (ABILIFY) tablet 30 mg, Daily    Artificial Tears ophthalmic solution 1 drop, Q3H PRN    atropine (ISOPTO ATROPINE) 1 % ophthalmic solution 1 drop, HS    haloperidol lactate (HALDOL) injection 2.5 mg, Q4H PRN Max 4/day **AND** LORazepam (ATIVAN) injection 1 mg, Q4H PRN Max 4/day **AND** benztropine (COGENTIN) injection 0.5 mg, Q4H PRN Max 4/day    benztropine (COGENTIN) injection 1 mg, Q4H PRN Max 6/day    haloperidol lactate (HALDOL) injection 5 mg, Q4H PRN Max 4/day **AND** LORazepam (ATIVAN) injection 2 mg, Q4H PRN Max 4/day **AND** benztropine (COGENTIN) injection 1 mg, Q4H PRN Max 4/day    benztropine (COGENTIN)  tablet 1 mg, Q4H PRN Max 6/day    benztropine (COGENTIN) tablet 1 mg, Q4H PRN Max 6/day    bisacodyl (DULCOLAX) rectal suppository 10 mg, Daily PRN    calcium carbonate (TUMS) chewable tablet 500 mg, BID PRN    cloZAPine (CLOZARIL) tablet 225 mg, HS    hydrOXYzine HCL (ATARAX) tablet 50 mg, Q6H PRN Max 4/day **OR** diphenhydrAMINE (BENADRYL) injection 50 mg, Q6H PRN    hydrOXYzine HCL (ATARAX) tablet 50 mg, Q6H PRN Max 4/day **OR** diphenhydrAMINE (BENADRYL) injection 50 mg, Q6H PRN    diphenhydrAMINE-zinc acetate (BENADRYL) 2-0.1 % cream, BID PRN    escitalopram (LEXAPRO) tablet 20 mg, Daily    famotidine (PEPCID) tablet 20 mg, BID PRN    fluticasone (FLONASE) 50 mcg/act nasal spray 1 spray, Daily PRN    glycopyrrolate (ROBINUL) tablet 1 mg, BID (AM & Afternoon)    glycopyrrolate (ROBINUL) tablet 2 mg, HS    haloperidol (HALDOL) tablet 1 mg, Q6H PRN    haloperidol (HALDOL) tablet 2.5 mg, Q4H PRN Max 4/day    haloperidol (HALDOL) tablet 5 mg, Q4H PRN Max 4/day    hydroCHLOROthiazide tablet 12.5 mg, Daily    hydrocortisone 1 % ointment, 4x Daily PRN    hydrOXYzine HCL (ATARAX) tablet 100 mg, Q6H PRN Max 4/day **OR** LORazepam (ATIVAN) injection 2 mg, Q6H PRN    hydrOXYzine HCL (ATARAX) tablet 100 mg, Q6H PRN Max 4/day **OR** LORazepam (ATIVAN) injection 2 mg, Q6H PRN    hydrOXYzine HCL (ATARAX) tablet 25 mg, Q6H PRN Max 4/day    ibuprofen (MOTRIN) tablet 600 mg, Q8H PRN    influenza vaccine (PF) (Fluarix) IM injection 0.5 mL, Once    loratadine (CLARITIN) tablet 10 mg, Daily    melatonin tablet 3 mg, HS PRN    melatonin tablet 9 mg, HS    methocarbamol (ROBAXIN) tablet 500 mg, Q6H PRN    multivitamin-minerals (CENTRUM) tablet 1 tablet, Daily    nicotine polacrilex (NICORETTE) gum 2 mg, Q4H PRN    OLANZapine (ZyPREXA) tablet 5 mg, Q4H PRN Max 3/day **OR** OLANZapine (ZyPREXA) IM injection 2.5 mg, Q4H PRN Max 3/day    OLANZapine (ZyPREXA) tablet 5 mg, Q3H PRN Max 3/day **OR** OLANZapine (ZyPREXA) IM injection 5 mg, Q3H  PRN Max 3/day    OLANZapine (ZyPREXA) tablet 2.5 mg, Q4H PRN Max 6/day    ondansetron (ZOFRAN-ODT) dispersible tablet 4 mg, Q6H PRN    pantoprazole (PROTONIX) EC tablet 20 mg, QAM    polyethylene glycol (MIRALAX) packet 17 g, Daily PRN    propranolol (INDERAL) tablet 10 mg, Q12H KAYLIE    senna-docusate sodium (SENOKOT S) 8.6-50 mg per tablet 1 tablet, Daily PRN    senna-docusate sodium (SENOKOT S) 8.6-50 mg per tablet 1 tablet, HS    traZODone (DESYREL) tablet 50 mg, HS PRN    white petrolatum-mineral oil (EUCERIN,HYDROCERIN) cream, TID PRN.      Lab Results: I have reviewed the following results:  Most Recent Labs:   Lab Results   Component Value Date    WBC 8.72 11/05/2024    RBC 5.66 (H) 11/05/2024    HGB 13.3 11/05/2024    HCT 43.2 11/05/2024     11/05/2024    RDW 14.4 11/05/2024    NEUTROABS 4.74 11/05/2024    SODIUM 140 10/16/2024    K 3.8 10/16/2024     10/16/2024    CO2 29 10/16/2024    BUN 9 10/16/2024    CREATININE 0.91 10/16/2024    GLUC 94 10/16/2024    GLUF 94 10/16/2024    CALCIUM 9.5 10/16/2024    AST 26 09/30/2024    ALT 42 09/30/2024    ALKPHOS 64 09/30/2024    TP 6.6 09/30/2024    ALB 3.8 09/30/2024    TBILI 0.47 09/30/2024    CHOLESTEROL 156 03/14/2024    HDL 44 03/14/2024    TRIG 133 03/14/2024    LDLCALC 85 03/14/2024    NONHDLC 112 03/14/2024    LITHIUM 0.61 01/09/2024    [id number] 1.062 11/15/2023    HGBA1C 5.0 04/01/2024    EAG 97 04/01/2024

## 2024-11-08 NOTE — SOCIAL WORK
"This writer spoke with patient's sister Tere ([phone]) regarding his benefits. She reports she has attempted to contact Monica Griffin with the [facility], however she has not received a return phone call. This writer will provide to Monica Holder's number to discuss his MA benefits and reinstating. Tere requested an in person visist with patient. She reports patient's mother will visit but she has requested patient not informed \"as it's a surprise\". This writer passed on information to nursing staff.   In person visit 11/9 at 1100.  "

## 2024-11-08 NOTE — NURSING NOTE
"Alberto is very visible in the milieu. He is social with select peers. He brightens on approach and reports nothing new; \"same AH; I am handling them\"   He reports minimal anxiety relating to these AH  No prn's required  He speaks on the phone with a former female patient (SC) despite the fact that she sometimes gives him anxiety related to \"being in a group home with a lot of guys\"  He is calm and polite and pleasant  "

## 2024-11-08 NOTE — NURSING NOTE
"Alberto spends a lot of time walking the halls with his peers. He is very visible. He brrightens quite a bit on approach. He states things are \"the same with his AH and mild anxiety relating to that but he can handle it\"  He states there are no changes  "

## 2024-11-08 NOTE — PROGRESS NOTES
Progress Note - Behavioral Health   Name: Alberto Berumen 29 y.o. male I MRN: [id number]  Unit/Bed#: [facility] 112-02 I Date of Admission: 3/29/2024   Date of Service: 11/8/2024 I Hospital Day: 224     Assessment & Plan  Schizoaffective disorder, bipolar type (HCC)  Reviewed on 11/8/2024    Compliant with medications appropriate still with same voices threatening to kill him and his family when he gets angry he has been hearing them for almost 3 years and is learning coping skills like punching the punching bag and keep himself busy waiting for ACT team to find him so he can be discharged to his aunt.  Not aggressive or agitated or threatening on the unit but with some sadness and vague paranoi    Continue Clozapine 225 mg qhs for psychosis -- to consider further careful titration for Sxs depending on response  Continue to obtain CBC with diff, CRP, and CK (weekly on Tues), and a Clozapine level and BNP (every 2 weeks on Tues)    Continue Abilify 30 mg qd for psychosis  Continue Escitalopram 20 mg qd for depression and anxiety    Continue propanolol 10 mg bid for anxiety   Pt remains on dual antipsychotic Tx due to failure on monotherapy      ACT team referral was made but waiting for [facility] funding to support ACT services  No associated orders from this encounter found during lookback period of 72 hours.    Chronic idiopathic constipation  Reviewed on 11/8/24  Follows with medical  Continue senna-docusate sodium dose 1 tablets before bed per medical team.     No associated orders from this encounter found during lookback period of 72 hours.  GERD (gastroesophageal reflux disease)  Reviewed on 11/8/24  Follows with medical  Continue pantoprazole 20 mg once a day    No associated orders from this encounter found during lookback period of 72 hours.  Medical clearance for psychiatric admission  Reviewed on 11/8/24  Ongoing by medical  No associated orders from this encounter found during lookback period of 72  hours.    Tobacco abuse  Reviewed on 11/8/24  Continue to encourage cessation upon discharge  No associated orders from this encounter found during lookback period of 72 hours.  Elevated hemoglobin A1c  Reviewed on 11/8/24  Follows with medical  No associated orders from this encounter found during lookback period of 72 hours.  Class 2 obesity in adult  Reviewed on 11/8/2020  BMI 38.66 diet counseling given  No associated orders from this encounter found during lookback period of 72 hours.    Primary hypertension  Reviewed on 11/8/2024 on Norvasc and hydrochlorothiazide followed up by medical  No associated orders from this encounter found during lookback period of 72 hours.    Patient Active Problem List   Diagnosis    GERD (gastroesophageal reflux disease)    Medical clearance for psychiatric admission    Schizoaffective disorder, bipolar type (HCC)    Tobacco abuse    T wave inversion in EKG    Syringoma    Chronic idiopathic constipation    Vitamin B 12 deficiency    Vitamin D deficiency    Confluent and reticulate papillomatosis    Class 2 obesity in adult    Primary hypertension    Elevated hemoglobin A1c    Bilateral lower extremity edema     Review of systems: Unremarkable   assessment  Overall Status: Still with same threatening voices in his usual  certification Statement: The patient will continue to require additional inpatient hospital stay due to ongoing voices that are threatening to mixing lack of response to medications and ECT until eligible for an ACT team to live with his aunt.     Medications:  propranolol 10 mg every 12 hours  for anxiety, Abilify 30 mg mg at bedtime Lexapro 20 mg once a day,  and senna once a day day for constipation clozapine 225 mg at bedtime  All medications reviewed  side effects from treatment: None reported   Medication changes   None today  Medication education  Risks side effects benefits and precautions of medications discussed with patient and he did verbalize an  understanding about risks for metabolic syndrome from being on neuroleptics and risk for tardive dyskinesia etc.   Understanding of medications: Has some understanding  Justification for dual anti-psychotics: Not applicable    Non-pharmacological treatments  Continue with individual, group, milieu and occupational therapy using recovery principles and psycho-education about accepting illness and the need for treatment.  Waiting for [facility] to found an ACT team referral and live with aunt  Maintenance ECTs however completed  Refuses to see a dietician and agrees to do more exercises as he is about 100 lbs overweight   Prolactin level high so Risperdal replaced with Abilify which he has tolerated well so far with prolactin level back to normal    Safety  Safety and communication plan established to target dynamic risk factors discussed above.    Discharge Plan back to his aunt when [facility] starts funding for ACT program as his medical assistance benefits are no longer available since he is on Medicare  Interval Progress   Note significant changes tolerating reintroduction of Clozapine up to 225 mg a day without any further increase in CK.  Still hearing same threatening voices to kill him and his family but he has been hearing them for more than 3 years and he is now learning positive coping skills like using the punching bag and plans to use headphones to distract himself when he gets out.  Still reports some sadness over the voices not leaving him alone but without any suicidal thoughts.  Has not been aggressive or agitated with no need for behavioral PRNs and attending some groups.  No bouts of aggression or agitation or threatening behaviors or self-abusive behaviors reported with no need for behavioral PRNs in the last 24 hours.  Walking around the unit and skipping some meals to lose weight  acceptance by patient: Accepting  Hopefulness in recovery: Living with his aunt  Involved in reintegration process: Talking  with aunt in the [place] and meeting with aunt and sister and other relatives on the unit  trusting in relationship with psychiatrist: Trusting  Sleep: Good  Appetite: Improving but occasionally skips meals to lose weight  Compliance with Medications: Good  Group attendance: Attending most of the groups 6/10  Significant events and progress towards goals: Status: Improving but with same voices waiting for funding for the ACT team    Mental Status Exam  Appearance: age appropriate, improved grooming, looks older than stated age, overweight, with hair in dreadlocks casually dressed with a clean shaven face, fairly groomed with good eye contact found pacing the hallway during morning exercise group   behavior: Friendly and cooperative with no good eye contact  speech: normal rate, normal volume, normal pitch  Mood: dysphoric, anxious, continues to report feeling good  continues to report some sadness over ongoing presence of voices that are threatening   affect: constricted, inappropriate, mood-congruent.  Constricted with some mood reactivity   thought Process: organized, logical, coherent, goal directed, linear, decreased rate of thoughts, slowing of thoughts, negative thinking, impaired abstract reasoning, concrete  Thought Content: paranoid ideation, some paranoia, grandiose ideas, intrusive thoughts, preoccupied, chronic, continues to report paranoia about people threatening to kill him and his family because of the voices.  He believes ECT has helped so that he is not much in his head.  No other delusions elicited.  No current suicidal or homicidal thoughts and no plans verbalized but today reports having passive death wishes because of voices with no plans and able to CFS and it has not changed yet  .  No phobias obsessions compulsions or distorted body perceptions reported.   Perceptual Disturbances: Continues to report same voices threatening to kill him and his family but not appearing as if responding  Hx  Risk Factors: chronic psychiatric problems, chronic anxiety symptoms, chronic psychotic symptoms,    Sensorium: Oriented x 3 spheres and situation  cognition: recent and remote memory grossly intact  Consciousness: alert and awake  Attention: Attention and concentration span good  Intellect: appears to be of average intelligence  Insight: intact  Judgement: intact  Motor Activity: no abnormal movements     Vitals  Temp:  [97.7 °F (36.5 °C)-98.1 °F (36.7 °C)] 97.7 °F (36.5 °C)  HR:  [88-95] 95  Resp:  [18] 18  BP: (117-131)/(69-79) 121/79  SpO2:  [98 %] 98 %  No intake or output data in the 24 hours ending 11/08/24 0401      Lab Results: All labs reviewed and prolactin level came down to normal on 8/1/2024, clozapine level 820 on 9/5/24  Current Facility-Administered Medications   Medication Dose Route Frequency Provider Last Rate    acetaminophen  650 mg Oral Q4H PRN Jordan C Holter, DO      acetaminophen  650 mg Oral Q6H PRN HOLLI Lion      aluminum-magnesium hydroxide-simethicone  30 mL Oral Q4H PRN Jordan C Holter, DO      amLODIPine  5 mg Oral Daily HOLLI Lion      ARIPiprazole  30 mg Oral Daily Bora Rosario MD      Artificial Tears  1 drop Both Eyes Q3H PRN Jordan C Holter, DO      atropine  1 drop Sublingual  Harjeet Torres, DO      haloperidol lactate  2.5 mg Intramuscular Q4H PRN Max 4/day HOLLI Lion      And    LORazepam  1 mg Intramuscular Q4H PRN Max 4/day HOLLI Lion      And    benztropine  0.5 mg Intramuscular Q4H PRN Max 4/day HOLLI Lion      benztropine  1 mg Intramuscular Q4H PRN Max 6/day Jordan C Holter,       haloperidol lactate  5 mg Intramuscular Q4H PRN Max 4/day HOLLI Lion      And    LORazepam  2 mg Intramuscular Q4H PRN Max 4/day HOLLI Lion      And    benztropine  1 mg Intramuscular Q4H PRN Max 4/day HOLLI Lion      benztropine  1 mg Oral Q4H PRN Max 6/day HOLLI Lion      benztropine  1 mg Oral Q4H PRN Max 6/day  Ion Dupontter, DO      bisacodyl  10 mg Rectal Daily PRN Eveline Hunt, HOLLI      calcium carbonate  500 mg Oral BID PRN HOLLI Monge      cloZAPine  225 mg Oral HS Hardik So MD      hydrOXYzine HCL  50 mg Oral Q6H PRN Max 4/day Jordan C Holter, DO      Or    diphenhydrAMINE  50 mg Intramuscular Q6H PRN Jordan C Holter, DO      hydrOXYzine HCL  50 mg Oral Q6H PRN Max 4/day HOLLI Lion      Or    diphenhydrAMINE  50 mg Intramuscular Q6H PRN Eveline Hunt, HOLLI      diphenhydrAMINE-zinc acetate   Topical BID PRN HOLLI Lion      escitalopram  20 mg Oral Daily HOLLI Lion      famotidine  20 mg Oral BID PRN HOLLI Monge      fluticasone  1 spray Each Nare Daily PRN HOLLI Monge      glycopyrrolate  1 mg Oral BID (AM & Afternoon) Bora Rosario MD      glycopyrrolate  2 mg Oral HS Bora Rosario MD      haloperidol  1 mg Oral Q6H PRN HOLLI Lion      haloperidol  2.5 mg Oral Q4H PRN Max 4/day HOLLI Lion      haloperidol  5 mg Oral Q4H PRN Max 4/day HOLLI Lion      hydroCHLOROthiazide  12.5 mg Oral Daily HOLLI Galvan      hydrocortisone   Topical 4x Daily PRN HOLLI Lion      hydrOXYzine HCL  100 mg Oral Q6H PRN Max 4/day Jordan C Holter, DO      Or    LORazepam  2 mg Intramuscular Q6H PRN Jordan C Holter, DO      hydrOXYzine HCL  100 mg Oral Q6H PRN Max 4/day HOLLI Lion      Or    LORazepam  2 mg Intramuscular Q6H PRN HOLLI Lion      hydrOXYzine HCL  25 mg Oral Q6H PRN Max 4/day Jordan C Holter, DO      ibuprofen  600 mg Oral Q8H PRN HOLLI Lion      influenza vaccine  0.5 mL Intramuscular Once Bora Rosario MD      loratadine  10 mg Oral Daily Brina Guillen MD      melatonin  3 mg Oral HS PRN Bora Rosario MD      melatonin  9 mg Oral HS Bora Rosario MD      methocarbamol  500 mg Oral Q6H PRN HOLLI Lion      multivitamin-minerals  1 tablet Oral Daily Eileen  HOLLI Higuera      nicotine polacrilex  2 mg Oral Q4H PRN Bora Rosario MD      OLANZapine  5 mg Oral Q4H PRN Max 3/day [facility] Holter, DO      Or    OLANZapine  2.5 mg Intramuscular Q4H PRN Max 3/day [facility] Holter, DO      OLANZapine  5 mg Oral Q3H PRN Max 3/day [facility] Holter, DO      Or    OLANZapine  5 mg Intramuscular Q3H PRN Max 3/day [facility] Holter, DO      OLANZapine  2.5 mg Oral Q4H PRN Max 6/day [facility] Holter, DO      ondansetron  4 mg Oral Q6H PRN HOLLI Lion      pantoprazole  20 mg Oral QAM [facility] Holter, DO      polyethylene glycol  17 g Oral Daily PRN HOLLI Ghotra      propranolol  10 mg Oral Q12H KAYLIE HOLLI Lion      senna-docusate sodium  1 tablet Oral Daily PRN [facility] Holter, DO      senna-docusate sodium  1 tablet Oral HS Eileen HOLLI Higuera      traZODone  50 mg Oral HS PRN HOLLI Lion      white petrolatum-mineral oil   Topical TID PRN HOLLI Lion         Counseling / Coordination of Care: Total floor / unit time spent today 15 minutes. Greater than 50% of total time was spent with the patient and / or family counseling and / or somewhat receptive to supportive listening and teaching positive coping skills to deal with symptom mangement.     Patient's Rights, confidentiality and exceptions to confidentiality, use of automated medical record, Behavioral Health Services staff access to medical record, and consent to treatment reviewed.    This note has been dictated and hence there may be problems with punctuation, spelling and formatting and if anyone has any concerns please address them to Dr. Rosario   This note is not shared with patient due to potential for making patient's condition worse by knowing the content of the note.

## 2024-11-08 NOTE — NURSING NOTE
Received pt in bed at change of shift with eyes closed; chest movement noted.  Continues the same thus this far as per q 15 min room checks.   Will continue to monitor behavior, sleeping pattern and any medical issues that may arise.    0546: Sleeping 7+ hrs thus this far

## 2024-11-08 NOTE — PROGRESS NOTES
Psychiatric Progress Note - Department of Behavioral Health   Alberto Berumen 29 y.o. male MRN: [id number]  Unit/Bed#: [facility] 112-02 Encounter: [id number]    ASSESSMENT & PLAN     Assessment & Plan  Schizoaffective disorder, bipolar type (HCC)  Reviewed on 11/5/24    Patient reports continued depression and anxiety, as well as symptoms consistent with AH. He notes that the voices threaten to harm him and his family. Patient denies any SI and HI, and denies symptoms consistent with VH. Patient notes persecutory delusions in regards to the voices he is hearing, but he denies delusions of grandiose, referential, or bizarre nature, and does not appear to be responding to internal stimuli.     Continue Clozapine 225 mg qhs for psychosis -- to consider further careful titration for Sxs depending on response  Continue to obtain CBC with diff, CRP, and CK (weekly on Tues), and a Clozapine level and BNP (every 2 weeks on Tues)    Continue Abilify 30 mg qd for psychosis  Continue Escitalopram 20 mg qd for depression and anxiety  Continue Senna-Docusate sodium one 50 mg tablet daily before bed for constipation  - decreased from two tablets daily on 11/3 due to reported loose stools  Continue propanolol 10 mg bid for anxiety   Continue amlodipine 5 mg PO daily     Pt remains on dual antipsychotic Tx due to failure on monotherapy      ACT team referral was made  No associated orders from this encounter found during lookback period of 72 hours.    Chronic idiopathic constipation  Reviewed on 11/5/24  Follows with medical  Continue senna-docusate sodium dose two tablets before bed per medical team.   - Patient dose was decreased to one tablet nightly on 11/3 due to reports of loose stools. Patient intends to discuss changing this back to two tablets today (11/5) with nursing staff.   No associated orders from this encounter found during lookback period of 72 hours.  GERD (gastroesophageal reflux disease)  Reviewed on  11/5/24  Follows with medical  Continue famotidine 20 mg tablet BID per medical team   Continue glycopyrrolate 1 mg tablet BID AM and afternoon per medical team  Continue glycopyrrolate 2 mg tablet before bed per medical team  Continue pantoprazole EC 20 mg tablet every morning per medical team  No associated orders from this encounter found during lookback period of 72 hours.  Medical clearance for psychiatric admission  Reviewed on 11/5/24  Ongoing by medical  No associated orders from this encounter found during lookback period of 72 hours.    Tobacco abuse  Reviewed on 11/5/24  Continue to encourage cessation upon discharge  No associated orders from this encounter found during lookback period of 72 hours.  Elevated hemoglobin A1c  Reviewed on 11/5/24  Follows with medical  No associated orders from this encounter found during lookback period of 72 hours.  Class 2 obesity in adult    No associated orders from this encounter found during lookback period of 72 hours.    Primary hypertension    No associated orders from this encounter found during lookback period of 72 hours.    Treatment Recommendations/Precautions:  Continue to promote patient participation in therapeutic milieu.  Continue medical management per medicine.  Continue previously prescribed psychotropic medication regimen; see below.  Continue behavioral health checks q.15 minutes.   Continue vitals per behavioral health unit protocol.  Discharge date per primary team; 201 commitment status.    SUBJECTIVE     Patient evaluated this a.m. for continuity of care. Patient was discussed at length with nursing and treatment team. Per nursing, patient remains calm, cooperative, present in the milieu, adherent to his medications less any acute adverse effects. No acute distress is noted throughout evaluation. Alberto Berumen denies suicidal/homicidal ideation in addition to thoughts of self-injury, receptive to crisis planning provided by this writer,  contacting for safety in the inpatient setting, admitting to an ability to appropriately confide in staff including this writer. Patient appears scant, sparse, suspicious, paranoid, superficial, denying any/all psychiatric complaints/concerns despite previous complaints pertaining to auditory hallucinations that are negative and derogatory    PSYCHIATRIC REVIEW OF SYSTEMS     Behavior over the last 24 hours:  unchanged  Sleep: adequate  Appetite: adequate  Medication side effects: No    REVIEW OF SYSTEMS   Review of systems: no complaints    OBJECTIVE     Vital Signs in Past 24 Hours:  Temp:  [97.7 °F (36.5 °C)-98.1 °F (36.7 °C)] 97.7 °F (36.5 °C)  HR:  [88-95] 95  BP: (117-131)/(69-79) 121/79  Resp:  [18] 18  SpO2:  [98 %] 98 %  O2 Device: None (Room air)    Intake/Output in Past 24 hours:  No intake/output data recorded.  No intake/output data recorded.        Laboratory Results:  I have personally reviewed all pertinent laboratory/tests results.  Most Recent Labs:   Lab Results   Component Value Date    WBC 8.72 11/05/2024    RBC 5.66 (H) 11/05/2024    HGB 13.3 11/05/2024    HCT 43.2 11/05/2024     11/05/2024    RDW 14.4 11/05/2024    NEUTROABS 4.74 11/05/2024    SODIUM 140 10/16/2024    K 3.8 10/16/2024     10/16/2024    CO2 29 10/16/2024    BUN 9 10/16/2024    CREATININE 0.91 10/16/2024    GLUC 94 10/16/2024    GLUF 94 10/16/2024    CALCIUM 9.5 10/16/2024    AST 26 09/30/2024    ALT 42 09/30/2024    ALKPHOS 64 09/30/2024    TP 6.6 09/30/2024    ALB 3.8 09/30/2024    TBILI 0.47 09/30/2024    CHOLESTEROL 156 03/14/2024    HDL 44 03/14/2024    TRIG 133 03/14/2024    LDLCALC 85 03/14/2024    NONHDLC 112 03/14/2024    LITHIUM 0.61 01/09/2024    [id number] 1.062 11/15/2023    HGBA1C 5.0 04/01/2024    EAG 97 04/01/2024       Behavioral Health Medications: all current active meds have been reviewed and current meds:   Current Facility-Administered Medications:     acetaminophen (TYLENOL) tablet 650 mg,  Q4H PRN    acetaminophen (TYLENOL) tablet 650 mg, Q6H PRN    aluminum-magnesium hydroxide-simethicone (MAALOX) oral suspension 30 mL, Q4H PRN    amLODIPine (NORVASC) tablet 5 mg, Daily    ARIPiprazole (ABILIFY) tablet 30 mg, Daily    Artificial Tears ophthalmic solution 1 drop, Q3H PRN    atropine (ISOPTO ATROPINE) 1 % ophthalmic solution 1 drop, HS    haloperidol lactate (HALDOL) injection 2.5 mg, Q4H PRN Max 4/day **AND** LORazepam (ATIVAN) injection 1 mg, Q4H PRN Max 4/day **AND** benztropine (COGENTIN) injection 0.5 mg, Q4H PRN Max 4/day    benztropine (COGENTIN) injection 1 mg, Q4H PRN Max 6/day    haloperidol lactate (HALDOL) injection 5 mg, Q4H PRN Max 4/day **AND** LORazepam (ATIVAN) injection 2 mg, Q4H PRN Max 4/day **AND** benztropine (COGENTIN) injection 1 mg, Q4H PRN Max 4/day    benztropine (COGENTIN) tablet 1 mg, Q4H PRN Max 6/day    benztropine (COGENTIN) tablet 1 mg, Q4H PRN Max 6/day    bisacodyl (DULCOLAX) rectal suppository 10 mg, Daily PRN    calcium carbonate (TUMS) chewable tablet 500 mg, BID PRN    cloZAPine (CLOZARIL) tablet 225 mg, HS    hydrOXYzine HCL (ATARAX) tablet 50 mg, Q6H PRN Max 4/day **OR** diphenhydrAMINE (BENADRYL) injection 50 mg, Q6H PRN    hydrOXYzine HCL (ATARAX) tablet 50 mg, Q6H PRN Max 4/day **OR** diphenhydrAMINE (BENADRYL) injection 50 mg, Q6H PRN    diphenhydrAMINE-zinc acetate (BENADRYL) 2-0.1 % cream, BID PRN    escitalopram (LEXAPRO) tablet 20 mg, Daily    famotidine (PEPCID) tablet 20 mg, BID PRN    fluticasone (FLONASE) 50 mcg/act nasal spray 1 spray, Daily PRN    glycopyrrolate (ROBINUL) tablet 1 mg, BID (AM & Afternoon)    glycopyrrolate (ROBINUL) tablet 2 mg, HS    haloperidol (HALDOL) tablet 1 mg, Q6H PRN    haloperidol (HALDOL) tablet 2.5 mg, Q4H PRN Max 4/day    haloperidol (HALDOL) tablet 5 mg, Q4H PRN Max 4/day    hydroCHLOROthiazide tablet 12.5 mg, Daily    hydrocortisone 1 % ointment, 4x Daily PRN    hydrOXYzine HCL (ATARAX) tablet 100 mg, Q6H PRN Max  4/day **OR** LORazepam (ATIVAN) injection 2 mg, Q6H PRN    hydrOXYzine HCL (ATARAX) tablet 100 mg, Q6H PRN Max 4/day **OR** LORazepam (ATIVAN) injection 2 mg, Q6H PRN    hydrOXYzine HCL (ATARAX) tablet 25 mg, Q6H PRN Max 4/day    ibuprofen (MOTRIN) tablet 600 mg, Q8H PRN    influenza vaccine (PF) (Fluarix) IM injection 0.5 mL, Once    loratadine (CLARITIN) tablet 10 mg, Daily    melatonin tablet 3 mg, HS PRN    melatonin tablet 9 mg, HS    methocarbamol (ROBAXIN) tablet 500 mg, Q6H PRN    multivitamin-minerals (CENTRUM) tablet 1 tablet, Daily    nicotine polacrilex (NICORETTE) gum 2 mg, Q4H PRN    OLANZapine (ZyPREXA) tablet 5 mg, Q4H PRN Max 3/day **OR** OLANZapine (ZyPREXA) IM injection 2.5 mg, Q4H PRN Max 3/day    OLANZapine (ZyPREXA) tablet 5 mg, Q3H PRN Max 3/day **OR** OLANZapine (ZyPREXA) IM injection 5 mg, Q3H PRN Max 3/day    OLANZapine (ZyPREXA) tablet 2.5 mg, Q4H PRN Max 6/day    ondansetron (ZOFRAN-ODT) dispersible tablet 4 mg, Q6H PRN    pantoprazole (PROTONIX) EC tablet 20 mg, QAM    polyethylene glycol (MIRALAX) packet 17 g, Daily PRN    propranolol (INDERAL) tablet 10 mg, Q12H KAYLIE    senna-docusate sodium (SENOKOT S) 8.6-50 mg per tablet 1 tablet, Daily PRN    senna-docusate sodium (SENOKOT S) 8.6-50 mg per tablet 1 tablet, HS    traZODone (DESYREL) tablet 50 mg, HS PRN    white petrolatum-mineral oil (EUCERIN,HYDROCERIN) cream, TID PRN.    Risks, benefits and possible side effects of Medications:   Risks, benefits, and possible side effects of medications explained to patient and patient verbalizes understanding.        Mental Status Evaluation:  Appearance:  age appropriate, casually dressed, and disheveled   Behavior:  psychomotor retardation , superficial with limited eye contact   Speech:  soft and scant, paucity of speech   Mood:  euthymic   Affect:  blunted and mood-incongruent   Language sparse   Thought Process:  concrete, possible thought blocking   Thought Content:  Appears paranoid    Perceptual Disturbances: None appearing internally preoccupied    Risk Potential: Suicidal Ideations none, Homicidal Ideations none, and Potential for Aggression No   Sensorium:  person, place, and time/date   Cognition:  recent and remote memory grossly intact   Consciousness:  alert and awake    Attention: attention span appeared shorter than expected for age   Insight:  limited   Judgment: limited   Intellect Not assessed   Gait/Station: normal gait/station and normal balance   Motor Activity: no abnormal movements     Memory: Short and long term memory  fair     Progress Toward Goals: unchanged, as evidenced by their participation (or lack thereof) in individual, social and therapeutic milieu in addition to adherence to their medication regimen.    Recommended Treatment:   See above for assessment and plan.    Inpatient Psychiatric Certification: Based upon physical, mental and social evaluations, I certify that inpatient psychiatric services are medically necessary for this patient for a duration of greater than 2 midnights for the treatment of Schizoaffective disorder, bipolar type (HCC) including psychotropic medication management, participation in the therapeutic milieu and referrals as indicated. Available alternative community resources do not meet the patient's mental health care needs. I further attest that an established written individualized plan of care has been implemented and is outlined in the patient's medical records.    Counseling/Coordination of Care    I have expended greater than 15 minutes in which more than 50% of this time was expended in counseling/coordination of patient care relating to diagnostic results, prognosis, potential risks and benefits of management options, instructions for appropriate management, patient and/or collateral education, importance of adherence to management and/or risk factor reductions. Patient's rights, confidentiality, exceptions to confidentiality, use of  electronic medical record including appropriate staff access to medical record regarding behavioral health services and consent to treatment were reviewed.    Note Share:     This note was not shared with the patient due to reasonable likelihood of causing patient harm     This note has been constructed using a voice recognition system. There may be translation, syntax,  or grammatical errors. If you have any questions, please contact the dictating provider.    Jordan Christopher Holter, DO 11/07/24

## 2024-11-09 PROCEDURE — 99232 SBSQ HOSP IP/OBS MODERATE 35: CPT

## 2024-11-09 RX ADMIN — LORATADINE 10 MG: 10 TABLET ORAL at 08:47

## 2024-11-09 RX ADMIN — ARIPIPRAZOLE 30 MG: 15 TABLET ORAL at 08:47

## 2024-11-09 RX ADMIN — NICOTINE POLACRILEX 2 MG: 2 GUM, CHEWING ORAL at 13:02

## 2024-11-09 RX ADMIN — NICOTINE POLACRILEX 2 MG: 2 GUM, CHEWING ORAL at 21:10

## 2024-11-09 RX ADMIN — MELATONIN TAB 3 MG 9 MG: 3 TAB at 21:10

## 2024-11-09 RX ADMIN — NICOTINE POLACRILEX 2 MG: 2 GUM, CHEWING ORAL at 17:29

## 2024-11-09 RX ADMIN — PROPRANOLOL HYDROCHLORIDE 10 MG: 10 TABLET ORAL at 08:48

## 2024-11-09 RX ADMIN — GLYCOPYRROLATE 1 MG: 1 TABLET ORAL at 08:47

## 2024-11-09 RX ADMIN — GLYCOPYRROLATE 1 MG: 1 TABLET ORAL at 13:02

## 2024-11-09 RX ADMIN — ATROPINE SULFATE 1 DROP: 10 SOLUTION/ DROPS OPHTHALMIC at 21:27

## 2024-11-09 RX ADMIN — PROPRANOLOL HYDROCHLORIDE 10 MG: 10 TABLET ORAL at 21:10

## 2024-11-09 RX ADMIN — GLYCOPYRROLATE 2 MG: 1 TABLET ORAL at 21:10

## 2024-11-09 RX ADMIN — PANTOPRAZOLE SODIUM 20 MG: 20 TABLET, DELAYED RELEASE ORAL at 08:47

## 2024-11-09 RX ADMIN — MULTIPLE VITAMINS W/ MINERALS TAB 1 TABLET: TAB ORAL at 08:47

## 2024-11-09 RX ADMIN — HYDROCHLOROTHIAZIDE 12.5 MG: 12.5 TABLET ORAL at 08:47

## 2024-11-09 RX ADMIN — SENNOSIDES AND DOCUSATE SODIUM 1 TABLET: 50; 8.6 TABLET ORAL at 21:11

## 2024-11-09 RX ADMIN — CLOZAPINE 225 MG: 25 TABLET ORAL at 21:10

## 2024-11-09 RX ADMIN — ESCITALOPRAM OXALATE 20 MG: 10 TABLET, FILM COATED ORAL at 08:49

## 2024-11-09 NOTE — NURSING NOTE
Alert, cooperative and visible intermittently. Consumed 75% of dinner. Took all medication without prompting. Pt had an in person visit from his sister and mother in beginning of shift. In person visit went well. Maintained on safe precautions without incident.

## 2024-11-09 NOTE — NURSING NOTE
Pt slept in this am and did not eat breakfast. Pt is currently awake, alert, cooperative and visible intermittently socializing with selected peers. No SI or HI noted. Pt states that he has depression a 9/10, and anxiety a 4/10. Pt states he has auditory hallucination telling him they are going to kill him when he leaves this unit. Denies pain. Attended art therapy, coping skills, and nursing education. Refused breakfast and consumed 100% of lunch. Took all medication without prompting. Maintained on safe precautions without incident. Will continue to monitor progress and recovery program.

## 2024-11-09 NOTE — PLAN OF CARE
Problem: Alteration in Thoughts and Perception  Goal: Treatment Goal: Gain control of psychotic behaviors/thinking, reduce/eliminate presenting symptoms and demonstrate improved reality functioning upon discharge  Outcome: Progressing  Goal: Verbalize thoughts and feelings  Description: Interventions:  - Promote a nonjudgmental and trusting relationship with the patient through active listening and therapeutic communication  - Assess patient's level of functioning, behavior and potential for risk  - Engage patient in 1 on 1 interactions  - Encourage patient to express fears, feelings, frustrations, and discuss symptoms    - [place] patient to reality, help patient recognize reality-based thinking   - Administer medications as ordered and assess for potential side effects  - Provide the patient education related to the signs and symptoms of the illness and desired effects of prescribed medications  Outcome: Progressing  Goal: Refrain from acting on delusional thinking/internal stimuli  Description: Interventions:  - Monitor patient closely, per order   - Utilize least restrictive measures   - Set reasonable limits, give positive feedback for acceptable   - Administer medications as ordered and monitor of potential side effects  Outcome: Progressing  Goal: Agree to be compliant with medication regime, as prescribed and report medication side effects  Description: Interventions:  - Offer appropriate PRN medication and supervise ingestion; conduct AIMS, as needed   Outcome: Progressing  Goal: Attend and participate in unit activities, including therapeutic, recreational, and educational groups  Description: Interventions:  -Encourage Visitation and family involvement in care  Outcome: Progressing  Goal: Complete daily ADLs, including personal hygiene independently, as able  Description: Interventions:  - Observe, teach, and assist patient with ADLS  - Monitor and promote a balance of rest/activity, with adequate  nutrition and elimination   Outcome: Progressing     Problem: Ineffective Coping  Goal: Participates in unit activities  Description: Interventions:  - Provide therapeutic environment   - Provide required programming   - Redirect inappropriate behaviors   Outcome: Progressing  Goal: Patient/Family participate in treatment and DC plans  Description: Interventions:  - Provide therapeutic environment  Outcome: Progressing  Goal: Patient/Family verbalizes awareness of resources  Outcome: Progressing     Problem: Depression  Goal: Treatment Goal: Demonstrate behavioral control of depressive symptoms, verbalize feelings of improved mood/affect, and adopt new coping skills prior to discharge  Outcome: Progressing  Goal: Verbalize thoughts and feelings  Description: Interventions:  - Assess and re-assess patient's level of risk   - Engage patient in 1:1 interactions, daily, for a minimum of 15 minutes   - Encourage patient to express feelings, fears, frustrations, hopes   Outcome: Progressing  Goal: Refrain from harming self  Description: Interventions:  - Monitor patient closely, per order   - Supervise medication ingestion, monitor effects and side effects   Outcome: Progressing  Goal: Refrain from isolation  Description: Interventions:  - Develop a trusting relationship   - Encourage socialization   Outcome: Progressing  Goal: Complete daily ADLs, including personal hygiene independently, as able  Description: Interventions:  - Observe, teach, and assist patient with ADLS  -  Monitor and promote a balance of rest/activity, with adequate nutrition and elimination   Outcome: Progressing     Problem: Anxiety  Goal: Anxiety is at manageable level  Description: Interventions:  - Assess and monitor patient's anxiety level.   - Monitor for signs and symptoms (heart palpitations, chest pain, shortness of breath, headaches, nausea, feeling jumpy, restlessness, irritable, apprehensive).   - Collaborate with interdisciplinary team  and initiate plan and interventions as ordered.  - [place] patient to unit/surroundings  - Explain treatment plan  - Encourage participation in care  - Encourage verbalization of concerns/fears  - Identify coping mechanisms  - Assist in developing anxiety-reducing skills  - Administer/offer alternative therapies  - Limit or eliminate stimulants  Outcome: Progressing     Problem: Alteration in Orientation  Goal: Interact with staff daily  Description: Interventions:  - Assess and re-assess patient's level of orientation  - Engage patient in 1 on 1 interactions, daily, for a minimum of 15 minutes   - Establish rapport/trust with patient   Outcome: Progressing  Goal: Attend and participate in unit activities, including therapeutic, recreational, and educational groups  Description: Interventions:  - Provide therapeutic and educational activities daily, encourage attendance and participation, and document same in the medical record   - Provide appropriate opportunities for reminiscence   - Provide a consistent daily routine   - Encourage family contact/visitation   Outcome: Progressing  Goal: Complete daily ADLs, including personal hygiene independently, as able  Description: Interventions:  - Observe, teach, and assist patient with ADLS  Outcome: Progressing     Problem: DISCHARGE PLANNING - CARE MANAGEMENT  Goal: Discharge to post-acute care or home with appropriate resources  Description: INTERVENTIONS:  - Conduct assessment to determine patient/family and health care team treatment goals, and need for post-acute services based on payer coverage, community resources, and patient preferences, and barriers to discharge  - Address psychosocial, clinical, and financial barriers to discharge as identified in assessment in conjunction with the patient/family and health care team  - Arrange appropriate level of post-acute services according to patient's   needs and preference and payer coverage in collaboration with the  physician and health care team  - Communicate with and update the patient/family, physician, and health care team regarding progress on the discharge plan  - Arrange appropriate transportation to post-acute venues  Outcome: Progressing

## 2024-11-10 VITALS
HEART RATE: 103 BPM | DIASTOLIC BLOOD PRESSURE: 79 MMHG | SYSTOLIC BLOOD PRESSURE: 120 MMHG | BODY MASS INDEX: 38.86 KG/M2 | TEMPERATURE: 97.8 F | RESPIRATION RATE: 18 BRPM | HEIGHT: 66 IN | OXYGEN SATURATION: 98 % | WEIGHT: 241.8 LBS

## 2024-11-10 LAB
MISCELLANEOUS LAB TEST RESULT: NORMAL
MISCELLANEOUS LAB TEST RESULT: NORMAL

## 2024-11-10 PROCEDURE — 99232 SBSQ HOSP IP/OBS MODERATE 35: CPT | Performed by: PSYCHIATRY & NEUROLOGY

## 2024-11-10 RX ORDER — DOCUSATE SODIUM 100 MG/1
100 CAPSULE, LIQUID FILLED ORAL 2 TIMES DAILY
Status: DISCONTINUED | OUTPATIENT
Start: 2024-11-10 | End: 2025-03-14 | Stop reason: HOSPADM

## 2024-11-10 RX ORDER — POLYETHYLENE GLYCOL 3350 17 G/17G
17 POWDER, FOR SOLUTION ORAL DAILY
Status: DISCONTINUED | OUTPATIENT
Start: 2024-11-11 | End: 2025-03-14 | Stop reason: HOSPADM

## 2024-11-10 RX ORDER — AMOXICILLIN 250 MG
2 CAPSULE ORAL
Status: DISCONTINUED | OUTPATIENT
Start: 2024-11-10 | End: 2025-03-14 | Stop reason: HOSPADM

## 2024-11-10 RX ADMIN — PROPRANOLOL HYDROCHLORIDE 10 MG: 10 TABLET ORAL at 08:44

## 2024-11-10 RX ADMIN — ATROPINE SULFATE 1 DROP: 10 SOLUTION/ DROPS OPHTHALMIC at 21:08

## 2024-11-10 RX ADMIN — NICOTINE POLACRILEX 2 MG: 2 GUM, CHEWING ORAL at 18:31

## 2024-11-10 RX ADMIN — ESCITALOPRAM OXALATE 20 MG: 10 TABLET, FILM COATED ORAL at 08:44

## 2024-11-10 RX ADMIN — PANTOPRAZOLE SODIUM 20 MG: 20 TABLET, DELAYED RELEASE ORAL at 08:44

## 2024-11-10 RX ADMIN — NICOTINE POLACRILEX 2 MG: 2 GUM, CHEWING ORAL at 14:11

## 2024-11-10 RX ADMIN — HYDROCHLOROTHIAZIDE 12.5 MG: 12.5 TABLET ORAL at 08:44

## 2024-11-10 RX ADMIN — GLYCOPYRROLATE 2 MG: 1 TABLET ORAL at 21:08

## 2024-11-10 RX ADMIN — MULTIPLE VITAMINS W/ MINERALS TAB 1 TABLET: TAB ORAL at 08:44

## 2024-11-10 RX ADMIN — SENNOSIDES AND DOCUSATE SODIUM 2 TABLET: 50; 8.6 TABLET ORAL at 21:08

## 2024-11-10 RX ADMIN — PROPRANOLOL HYDROCHLORIDE 10 MG: 10 TABLET ORAL at 21:08

## 2024-11-10 RX ADMIN — CLOZAPINE 225 MG: 25 TABLET ORAL at 21:08

## 2024-11-10 RX ADMIN — GLYCOPYRROLATE 1 MG: 1 TABLET ORAL at 08:44

## 2024-11-10 RX ADMIN — GLYCOPYRROLATE 1 MG: 1 TABLET ORAL at 14:11

## 2024-11-10 RX ADMIN — NICOTINE POLACRILEX 2 MG: 2 GUM, CHEWING ORAL at 08:46

## 2024-11-10 RX ADMIN — ARIPIPRAZOLE 30 MG: 15 TABLET ORAL at 08:44

## 2024-11-10 RX ADMIN — MELATONIN TAB 3 MG 9 MG: 3 TAB at 21:08

## 2024-11-10 RX ADMIN — LORATADINE 10 MG: 10 TABLET ORAL at 08:45

## 2024-11-10 NOTE — NURSING NOTE
This Nurse spoke with Yenifer in  the Lab downstairs regarding pending ckmb labs from 10/29 and 11/5/24.  She stated they are being processed at [facility]. Processing turn around time states 1-3 days. She was asked to call [facility] to inquire about the delay. She was asked to place me on hold while she notifies them. [facility] told her they will fax her the results and she will enter them into the computer.

## 2024-11-10 NOTE — NURSING NOTE
Weekly wellness assessment completed. Pt lung sounds clear in all lung fields. No edema noted. Bowel sounds +x4. B/l pedal pulses papable. Skin intact except dryness to feet. Skin warm and color within normal limits for patient.

## 2024-11-10 NOTE — NURSING NOTE
Most recent CKMB/CK result is under media and scanned. Visible on unit. Offers no complaints or suicidal ideations.  Appetite ok .

## 2024-11-10 NOTE — NURSING NOTE
Pt having complaints of constipation. Slim admitting medical provider notified and gave N.O senokot 2 tablets daily at bedtime, miralax daily and PRN MOM, and colace twice daily.

## 2024-11-10 NOTE — PROGRESS NOTES
Progress Note - Behavioral Health   Name: Alberto Berumen 29 y.o. male I MRN: [id number]  Unit/Bed#: [facility] 112-02 I Date of Admission: 3/29/2024   Date of Service: 11/10/2024 I Hospital Day: 226     Assessment & Plan  Schizoaffective disorder, bipolar type (HCC)  Reviewed on 11/5/24    Patient reports continued depression and anxiety, as well as symptoms consistent with AH. He notes that the voices threaten to harm him and his family. Patient denies any SI and HI, and denies symptoms consistent with VH. Patient notes persecutory delusions in regards to the voices he is hearing, but he denies delusions of grandiose, referential, or bizarre nature, and does not appear to be responding to internal stimuli.     Continue Clozapine 225 mg qhs for psychosis -- to consider further careful titration for Sxs depending on response  Continue to obtain CBC with diff, CRP, and CK (weekly on Tues), and a Clozapine level and BNP (every 2 weeks on Tues)    Continue Abilify 30 mg qd for psychosis  Continue Escitalopram 20 mg qd for depression and anxiety  Continue Senna-Docusate sodium one 50 mg tablet daily before bed for constipation  - decreased from two tablets daily on 11/3 due to reported loose stools  Continue propanolol 10 mg bid for anxiety   Continue amlodipine 5 mg PO daily     Pt remains on dual antipsychotic Tx due to failure on monotherapy      ACT team referral was made  No associated orders from this encounter found during lookback period of 72 hours.    Chronic idiopathic constipation  Reviewed on 11/5/24  Follows with medical  Continue senna-docusate sodium dose two tablets before bed per medical team.   - Patient dose was decreased to one tablet nightly on 11/3 due to reports of loose stools. Patient intends to discuss changing this back to two tablets today (11/5) with nursing staff.   No associated orders from this encounter found during lookback period of 72 hours.  GERD (gastroesophageal reflux  disease)  Reviewed on 11/5/24  Follows with medical  Continue famotidine 20 mg tablet BID per medical team   Continue glycopyrrolate 1 mg tablet BID AM and afternoon per medical team  Continue glycopyrrolate 2 mg tablet before bed per medical team  Continue pantoprazole EC 20 mg tablet every morning per medical team  No associated orders from this encounter found during lookback period of 72 hours.  Medical clearance for psychiatric admission  Reviewed on 11/5/24  Ongoing by medical  No associated orders from this encounter found during lookback period of 72 hours.    Tobacco abuse  Reviewed on 11/5/24  Continue to encourage cessation upon discharge  No associated orders from this encounter found during lookback period of 72 hours.  Elevated hemoglobin A1c  Reviewed on 11/5/24  Follows with medical  No associated orders from this encounter found during lookback period of 72 hours.  Class 2 obesity in adult    No associated orders from this encounter found during lookback period of 72 hours.    Primary hypertension    No associated orders from this encounter found during lookback period of 72 hours.    Progress Toward Goals: progressing    Recommended Treatment: Continue with group therapy, milieu therapy and occupational therapy.      Risks, benefits and possible side effects of Medications:   Risks, benefits, and possible side effects of medications explained to patient and patient verbalizes understanding.      History of Present Illness   Behavior over the last 24 hours:  unchanged  Sleep: normal  Appetite: normal  Medication side effects: No  ROS: no complaints    Subjective: Per nursing, Alberto has been cooperative, intermittently visible in milieu, depressed mood, AH telling him they are going to kill him when he leaves the hospital, some milieu participation, medication compliant. On evaluation today, patient is resting in bed. He reports having  a positive family visit yesterday. He continues to report  "depressed mood. Reports AH occur \"all day everyday.\" No command hallucinations. He denies SI/HI or medication side effects.     Objective   Mental Status Evaluation:  Appearance:  casually dressed   Behavior:  guarded and cooperative, in bed, poor eye contact    Speech:  soft and scant   Mood:  depressed   Affect:  flat   Thought Process:  goal directed   Associations: intact associations   Thought Content:  delusions  persecutory   Perceptual Disturbances: Auditory hallucinations without commands   Risk Potential: Suicidal Ideations none  Homicidal Ideations none  Potential for Aggression No   Sensorium:  person, place, time/date, and situation   Memory:  recent and remote memory grossly intact   Consciousness:  alert and awake    Attention: attention span appeared shorter than expected for age   Insight:  limited   Judgment: limited   Gait/Station: In bed, not observed    Motor Activity: no abnormal movements     Medications: all current active meds have been reviewed, continue current psychiatric medications, and current meds:   Current Facility-Administered Medications:     acetaminophen (TYLENOL) tablet 650 mg, Q4H PRN    acetaminophen (TYLENOL) tablet 650 mg, Q6H PRN    aluminum-magnesium hydroxide-simethicone (MAALOX) oral suspension 30 mL, Q4H PRN    amLODIPine (NORVASC) tablet 5 mg, Daily    ARIPiprazole (ABILIFY) tablet 30 mg, Daily    Artificial Tears ophthalmic solution 1 drop, Q3H PRN    atropine (ISOPTO ATROPINE) 1 % ophthalmic solution 1 drop, HS    haloperidol lactate (HALDOL) injection 2.5 mg, Q4H PRN Max 4/day **AND** LORazepam (ATIVAN) injection 1 mg, Q4H PRN Max 4/day **AND** benztropine (COGENTIN) injection 0.5 mg, Q4H PRN Max 4/day    benztropine (COGENTIN) injection 1 mg, Q4H PRN Max 6/day    haloperidol lactate (HALDOL) injection 5 mg, Q4H PRN Max 4/day **AND** LORazepam (ATIVAN) injection 2 mg, Q4H PRN Max 4/day **AND** benztropine (COGENTIN) injection 1 mg, Q4H PRN Max 4/day    benztropine " (COGENTIN) tablet 1 mg, Q4H PRN Max 6/day    benztropine (COGENTIN) tablet 1 mg, Q4H PRN Max 6/day    bisacodyl (DULCOLAX) rectal suppository 10 mg, Daily PRN    calcium carbonate (TUMS) chewable tablet 500 mg, BID PRN    cloZAPine (CLOZARIL) tablet 225 mg, HS    hydrOXYzine HCL (ATARAX) tablet 50 mg, Q6H PRN Max 4/day **OR** diphenhydrAMINE (BENADRYL) injection 50 mg, Q6H PRN    hydrOXYzine HCL (ATARAX) tablet 50 mg, Q6H PRN Max 4/day **OR** diphenhydrAMINE (BENADRYL) injection 50 mg, Q6H PRN    diphenhydrAMINE-zinc acetate (BENADRYL) 2-0.1 % cream, BID PRN    escitalopram (LEXAPRO) tablet 20 mg, Daily    famotidine (PEPCID) tablet 20 mg, BID PRN    fluticasone (FLONASE) 50 mcg/act nasal spray 1 spray, Daily PRN    glycopyrrolate (ROBINUL) tablet 1 mg, BID (AM & Afternoon)    glycopyrrolate (ROBINUL) tablet 2 mg, HS    haloperidol (HALDOL) tablet 1 mg, Q6H PRN    haloperidol (HALDOL) tablet 2.5 mg, Q4H PRN Max 4/day    haloperidol (HALDOL) tablet 5 mg, Q4H PRN Max 4/day    hydroCHLOROthiazide tablet 12.5 mg, Daily    hydrocortisone 1 % ointment, 4x Daily PRN    hydrOXYzine HCL (ATARAX) tablet 100 mg, Q6H PRN Max 4/day **OR** LORazepam (ATIVAN) injection 2 mg, Q6H PRN    hydrOXYzine HCL (ATARAX) tablet 100 mg, Q6H PRN Max 4/day **OR** LORazepam (ATIVAN) injection 2 mg, Q6H PRN    hydrOXYzine HCL (ATARAX) tablet 25 mg, Q6H PRN Max 4/day    ibuprofen (MOTRIN) tablet 600 mg, Q8H PRN    influenza vaccine (PF) (Fluarix) IM injection 0.5 mL, Once    loratadine (CLARITIN) tablet 10 mg, Daily    melatonin tablet 3 mg, HS PRN    melatonin tablet 9 mg, HS    methocarbamol (ROBAXIN) tablet 500 mg, Q6H PRN    multivitamin-minerals (CENTRUM) tablet 1 tablet, Daily    nicotine polacrilex (NICORETTE) gum 2 mg, Q4H PRN    OLANZapine (ZyPREXA) tablet 5 mg, Q4H PRN Max 3/day **OR** OLANZapine (ZyPREXA) IM injection 2.5 mg, Q4H PRN Max 3/day    OLANZapine (ZyPREXA) tablet 5 mg, Q3H PRN Max 3/day **OR** OLANZapine (ZyPREXA) IM  injection 5 mg, Q3H PRN Max 3/day    OLANZapine (ZyPREXA) tablet 2.5 mg, Q4H PRN Max 6/day    ondansetron (ZOFRAN-ODT) dispersible tablet 4 mg, Q6H PRN    pantoprazole (PROTONIX) EC tablet 20 mg, QAM    polyethylene glycol (MIRALAX) packet 17 g, Daily PRN    propranolol (INDERAL) tablet 10 mg, Q12H KAYLIE    senna-docusate sodium (SENOKOT S) 8.6-50 mg per tablet 1 tablet, Daily PRN    senna-docusate sodium (SENOKOT S) 8.6-50 mg per tablet 1 tablet, HS    traZODone (DESYREL) tablet 50 mg, HS PRN    white petrolatum-mineral oil (EUCERIN,HYDROCERIN) cream, TID PRN.      Lab Results: I have reviewed the following results:  Most Recent Labs:   Lab Results   Component Value Date    WBC 8.72 11/05/2024    RBC 5.66 (H) 11/05/2024    HGB 13.3 11/05/2024    HCT 43.2 11/05/2024     11/05/2024    RDW 14.4 11/05/2024    NEUTROABS 4.74 11/05/2024    SODIUM 140 10/16/2024    K 3.8 10/16/2024     10/16/2024    CO2 29 10/16/2024    BUN 9 10/16/2024    CREATININE 0.91 10/16/2024    GLUC 94 10/16/2024    GLUF 94 10/16/2024    CALCIUM 9.5 10/16/2024    AST 26 09/30/2024    ALT 42 09/30/2024    ALKPHOS 64 09/30/2024    TP 6.6 09/30/2024    ALB 3.8 09/30/2024    TBILI 0.47 09/30/2024    CHOLESTEROL 156 03/14/2024    HDL 44 03/14/2024    TRIG 133 03/14/2024    LDLCALC 85 03/14/2024    NONHDLC 112 03/14/2024    LITHIUM 0.61 01/09/2024    [id number] 1.062 11/15/2023    HGBA1C 5.0 04/01/2024    EAG 97 04/01/2024

## 2024-11-11 PROCEDURE — 99232 SBSQ HOSP IP/OBS MODERATE 35: CPT | Performed by: PSYCHIATRY & NEUROLOGY

## 2024-11-11 RX ADMIN — NICOTINE POLACRILEX 2 MG: 2 GUM, CHEWING ORAL at 09:17

## 2024-11-11 RX ADMIN — ESCITALOPRAM OXALATE 20 MG: 10 TABLET, FILM COATED ORAL at 09:07

## 2024-11-11 RX ADMIN — HYDROCHLOROTHIAZIDE 12.5 MG: 12.5 TABLET ORAL at 09:07

## 2024-11-11 RX ADMIN — GLYCOPYRROLATE 1 MG: 1 TABLET ORAL at 09:07

## 2024-11-11 RX ADMIN — PANTOPRAZOLE SODIUM 20 MG: 20 TABLET, DELAYED RELEASE ORAL at 09:07

## 2024-11-11 RX ADMIN — ARIPIPRAZOLE 30 MG: 15 TABLET ORAL at 09:07

## 2024-11-11 RX ADMIN — AMLODIPINE BESYLATE 5 MG: 5 TABLET ORAL at 09:07

## 2024-11-11 RX ADMIN — ATROPINE SULFATE 1 DROP: 10 SOLUTION/ DROPS OPHTHALMIC at 21:05

## 2024-11-11 RX ADMIN — GLYCOPYRROLATE 2 MG: 1 TABLET ORAL at 21:05

## 2024-11-11 RX ADMIN — DOCUSATE SODIUM 100 MG: 100 CAPSULE, LIQUID FILLED ORAL at 17:09

## 2024-11-11 RX ADMIN — POLYETHYLENE GLYCOL 3350 17 G: 17 POWDER, FOR SOLUTION ORAL at 09:07

## 2024-11-11 RX ADMIN — PROPRANOLOL HYDROCHLORIDE 10 MG: 10 TABLET ORAL at 21:06

## 2024-11-11 RX ADMIN — PROPRANOLOL HYDROCHLORIDE 10 MG: 10 TABLET ORAL at 09:07

## 2024-11-11 RX ADMIN — MELATONIN TAB 3 MG 9 MG: 3 TAB at 21:05

## 2024-11-11 RX ADMIN — SENNOSIDES AND DOCUSATE SODIUM 2 TABLET: 50; 8.6 TABLET ORAL at 21:05

## 2024-11-11 RX ADMIN — LORATADINE 10 MG: 10 TABLET ORAL at 09:07

## 2024-11-11 RX ADMIN — DOCUSATE SODIUM 100 MG: 100 CAPSULE, LIQUID FILLED ORAL at 09:07

## 2024-11-11 RX ADMIN — NICOTINE POLACRILEX 2 MG: 2 GUM, CHEWING ORAL at 13:31

## 2024-11-11 RX ADMIN — NICOTINE POLACRILEX 2 MG: 2 GUM, CHEWING ORAL at 21:06

## 2024-11-11 RX ADMIN — NICOTINE POLACRILEX 2 MG: 2 GUM, CHEWING ORAL at 17:24

## 2024-11-11 RX ADMIN — CLOZAPINE 225 MG: 25 TABLET ORAL at 21:06

## 2024-11-11 RX ADMIN — GLYCOPYRROLATE 1 MG: 1 TABLET ORAL at 13:31

## 2024-11-11 RX ADMIN — MULTIPLE VITAMINS W/ MINERALS TAB 1 TABLET: TAB ORAL at 09:07

## 2024-11-11 NOTE — PLAN OF CARE
Problem: Ineffective Coping  Goal: Identifies ineffective coping skills  Outcome: Progressing  Goal: Identifies healthy coping skills  Outcome: Progressing  Goal: Demonstrates healthy coping skills  Outcome: Progressing  Goal: Participates in unit activities  Description: Interventions:  - Provide therapeutic environment   - Provide required programming   - Redirect inappropriate behaviors   Outcome: Progressing   Alberto continues to make progress towards the goal as evidenced by actively engaging and attending groups.

## 2024-11-11 NOTE — NURSING NOTE
Patient is visible on unit, social with peers. Pt visibly upset over passing of former peer. Pt is comforted and knows to inform us if any needs arise. No distress noted. Pt reports no s/s, takes medications without issue.

## 2024-11-11 NOTE — NURSING NOTE
Alberto maintained on ongoing SAFE precaution without incident on this shift. Awake, alert,,pleasant and cooperative.  Engage with selective peers.  Attended and participated in 3 out of 7 groups today. Continues to be compliant with medication regimen, held off on taken schedule MOM and colace 100mg because he had a bowel movement earlier.  MOM and colace returned to [employer] for credit.  Denies all psych symptoms.

## 2024-11-11 NOTE — PROGRESS NOTES
11/11/24 0853   Team Meeting   Meeting Type Daily Rounds   Team Members Present   Team Members Present Physician;Nurse;;Other (Discipline and Name)   Physician Team Member Holter, Thomas   Nursing Team Member Claudia   Social Work Team Member Jose J ORTEGA   Other (Discipline and Name) [facility]   Patient/Family Present   Patient Present No   Patient's Family Present No     Groups Participation  8/9.   Patient's compliant with medications. He's engaged with treatment. He's appropriate and visit with family.

## 2024-11-11 NOTE — PROGRESS NOTES
Found laying on bed initially later found pacing the hallway during morning exercise group progress Note - Behavioral Health   Name: Alberto Berumen 29 y.o. male I MRN: [id number]  Unit/Bed#: [facility] 112-02 I Date of Admission: 3/29/2024   Date of Service: 11/11/2024 I Hospital Day: 227     Assessment & Plan  Schizoaffective disorder, bipolar type (HCC)  Reviewed on 11/11/2024  No significant changes complaints of same threatening voices which makes him paranoid at times and makes him sad but not to the point of suicide and tells me that he is trying to practice positive coping skills like punching the punching bag and walking the hallways and learning ways to live with the voices that he has been hearing for over 2 years  Continue Clozapine 225 mg qhs for psychosis -- to consider further careful titration for Sxs depending on response because of a history of increased CK  Continue to obtain CBC with diff, CRP, and CK (weekly on Tues), and a Clozapine level and BNP (every 2 weeks on Tues)  Continue Abilify 30 mg qd for psychosis  Continue Escitalopram 20 mg qd for depression and anxiety  Continue propanolol 10 mg bid for anxiet  Pt remains on dual antipsychotic Tx due to failure on monotherapy      ACT team referral was made for discharge plan on hold as he lost his MA benefits and waiting for the [facility] to fund his ACT team, to live with his aunt again  Individual group milieu treatment and psychoeducation  No associated orders from this encounter found during lookback period of 72 hours.    Chronic idiopathic constipation  Reviewed on 11/11/2024  Follows with medical  Continue senna-docusate sodium dose two tablets before bed per medical team.   - Patient dose was decreased to one tablet nightly on 11/3 due to reports of loose stools.  Refused MiraLAX and senna reinstated by medical, as he had a bowel movement   No associated orders from this encounter found during lookback period of 72 hours.  GERD  (gastroesophageal reflux disease)  Reviewed on 11/11/2024  Follows with medical  Continue pantoprazole EC 40 mg tablet every morning per medical team  No associated orders from this encounter found during lookback period of 72 hours.  Medical clearance for psychiatric admission  Reviewed on 11/11/2024  Ongoing by medical  No associated orders from this encounter found during lookback period of 72 hours.    Tobacco abuse  Reviewed on 11/11/2024  Continue to encourage cessation upon discharge  No associated orders from this encounter found during lookback period of 72 hours.  Elevated hemoglobin A1c  Reviewed on 11/11/2024  Follows with medical  No associated orders from this encounter found during lookback period of 72 hours.  Class 2 obesity in adult  Reviewed on 11/11/2024 dietary counseling given  No associated orders from this encounter found during lookback period of 72 hours.    Primary hypertension  Reviewed on 11/11/2024 onContinue amlodipine 5 mg PO daily and  hydrochlorothiazide 12.5 mg a day, followed up by medical  No associated orders from this encounter found during lookback period of 72 hours.      Patient Active Problem List   Diagnosis    GERD (gastroesophageal reflux disease)    Medical clearance for psychiatric admission    Schizoaffective disorder, bipolar type ([facility])    Tobacco abuse    T wave inversion in EKG    Syringoma    Chronic idiopathic constipation    Vitamin B 12 deficiency    Vitamin D deficiency    Confluent and reticulate papillomatosis    Class 2 obesity in adult    Primary hypertension    Elevated hemoglobin A1c    Bilateral lower extremity edema     Review of systems: Refused MiraLAX and Senokot as he had a bowel movement the other day   assessment  Overall Status: Status quo with no significant changes  certification Statement: The patient will continue to require additional inpatient hospital stay due to ongoing voices that are threatening to mixing lack of response to  medications and ECT until eligible for an ACT team to live with his aunt.     Medications:  propranolol 10 mg every 12 hours  for anxiety, Abilify 30 mg mg at bedtime Lexapro 20 mg once a day,  and senna once a day day for constipation clozapine 225 mg at bedtime  All medications reviewed  side effects from treatment: None reported   Medication changes   None today  Medication education  Risks side effects benefits and precautions of medications discussed with patient and he did verbalize an understanding about risks for metabolic syndrome from being on neuroleptics and risk for tardive dyskinesia etc.   Understanding of medications: Has some understanding  Justification for dual anti-psychotics: Not applicable    Non-pharmacological treatments  Continue with individual, group, milieu and occupational therapy using recovery principles and psycho-education about accepting illness and the need for treatment.  Waiting for [facility] to fund an ACT team as he lost his MA benefits and to live with aunt in the [facility]  Maintenance ECTs  completed  Refuses to see a dietician and agrees to do more exercises as he is about 100 lbs overweight   Prolactin level high so Risperdal replaced with Abilify which he has tolerated well so far with prolactin level back to normal  Counseled to take the stool softeners and laxatives regularly    Safety  Safety and communication plan established to target dynamic risk factors discussed above.    Discharge Plan back to his aunt when [facility] starts funding for ACT program as his medical assistance benefits are no longer available since he is on Medicare  Interval Progress   No significant changes tolerating clozapine as 225 mg.  No further increase in CK.  Still hearing same threatening voices to kill him and his family but he has been hearing them for almost 3 years he tells me he is using the punching bag and plans to use headphones to distract himself and paces the hallways as positive coping  skills to learn to live with the voices and not aggressive or agitated or threatening or self-abusive and no need for behavioral PRNs in the last 24 hours.  Tells me he is walking around the unit loses weight and skipping some meals.  Not too sleepy or tired in the mornings and is usually up and moving around the unit when approached. He visted with his sister who brought along his mother as well.   acceptance by patient: Accepting  Hopefulness in recovery: Living with his aunt  Involved in reintegration process: Talking with his siblings from [place] and with his aunt in the [facility]  trusting in relationship with psychiatrist: Trusting  Sleep: Good   Appetite: improving but occasionally skipping meals to lose weight  Compliance with Medications: Good  Group attendance: Attending most of the groups 9/11  Significant events and progress towards goals: Improving but with same voices waiting for funding from the [facility] for an ACT team    Mental Status Exam  Appearance: age appropriate, improved grooming, looks older than stated age, overweight, with hair in dreadlocks casually dressed with a clean shaven face, fairly groomed with good eye contact found initially laying on bed but hardly woken up and later found taking part in the morning exercise group walking around the unit  Behavior: Friendly cooperative and pleasant continues to report some sadness,  speech: normal rate, normal volume, normal pitch  Mood: dysphoric, anxious, continues to report feeling good  ongoing presence of voices that are threatening as usual constricted with   Affect: constricted, inappropriate, mood-congruent.  No good mood reactivity thought Process: organized, logical, coherent, goal directed, linear, decreased rate of thoughts, slowing of thoughts, negative thinking, impaired abstract reasoning, concrete  Thought Content: paranoid ideation, some paranoia, grandiose ideas, intrusive thoughts, preoccupied, chronic, continues to report  paranoia about people threatening to kill him and his family because of the voices.  He believes ECT has helped so that he is not much in his head.  No other delusions elicited.  No current suicidal or homicidal thoughts and no plans verbalized but today reports having passive death wishes because of voices with no plans and able to CFS and it has not changed yet  .  No phobias obsessions compulsions or distorted body perceptions reported.   Perceptual Disturbances: Continues to report same threatening voices as before but not observed to respond  Hx Risk Factors: chronic psychiatric problems, chronic anxiety symptoms, chronic psychotic symptoms,    Sensorium:  oriented x 3 spheres and situationAttention and attention span  Cognition: recent and remote memory grossly intact  Consciousness: alert and awake  Attention: attention and concentration span intact  Intellect: appears to be of average intelligence  Insight: intact  Judgement: intact  Motor Activity: no abnormal movements     Vitals  Temp:  [95.8 °F (35.4 °C)-97.8 °F (36.6 °C)] 97.8 °F (36.6 °C)  HR:  [] 103  Resp:  [18] 18  BP: (119-130)/(71-82) 120/79  SpO2:  [98 %] 98 %    Intake/Output Summary (Last 24 hours) at 11/11/2024 0544  Last data filed at 11/10/2024 1254  Gross per 24 hour   Intake 0 ml   Output --   Net 0 ml         Lab Results: All labs reviewed and prolactin level came down to normal on 8/1/2024, clozapine level 820 on 9/5/24  Current Facility-Administered Medications   Medication Dose Route Frequency Provider Last Rate    acetaminophen  650 mg Oral Q4H PRN Jordan C Holter, DO      acetaminophen  650 mg Oral Q6H PRN HOLLI Lion      aluminum-magnesium hydroxide-simethicone  30 mL Oral Q4H PRN Jordan C Holter, DO      amLODIPine  5 mg Oral Daily HOLLI Lion      ARIPiprazole  30 mg Oral Daily Bora Rosario MD      Artificial Tears  1 drop Both Eyes Q3H PRN Jordan C Holter, DO      atropine  1 drop Sublingual HS Harjeet  Melissa, DO      haloperidol lactate  2.5 mg Intramuscular Q4H PRN Max 4/day STEVE LionNP      And    LORazepam  1 mg Intramuscular Q4H PRN Max 4/day STEVE LionNP      And    benztropine  0.5 mg Intramuscular Q4H PRN Max 4/day HOLLI Lion      benztropine  1 mg Intramuscular Q4H PRN Max 6/day Jordan C Holter, DO      haloperidol lactate  5 mg Intramuscular Q4H PRN Max 4/day STEVE LionNP      And    LORazepam  2 mg Intramuscular Q4H PRN Max 4/day STEVE LionNP      And    benztropine  1 mg Intramuscular Q4H PRN Max 4/day HOLLI Lion      benztropine  1 mg Oral Q4H PRN Max 6/day HOLLI Lion      benztropine  1 mg Oral Q4H PRN Max 6/day Jordan C Holter, DO      bisacodyl  10 mg Rectal Daily PRN HOLLI Lion      calcium carbonate  500 mg Oral BID PRN HOLLI Monge      cloZAPine  225 mg Oral HS Hardik So MD      hydrOXYzine HCL  50 mg Oral Q6H PRN Max 4/day Jordan C Holter, DO      Or    diphenhydrAMINE  50 mg Intramuscular Q6H PRN Jordan C Holter, DO      hydrOXYzine HCL  50 mg Oral Q6H PRN Max 4/day HOLLI Lion      Or    diphenhydrAMINE  50 mg Intramuscular Q6H PRN HOLLI Lion      diphenhydrAMINE-zinc acetate   Topical BID PRN HOLLI Lion      docusate sodium  100 mg Oral BID Jourdan Dickens, DO      escitalopram  20 mg Oral Daily HOLLI Lion      famotidine  20 mg Oral BID PRN HOLLI Monge      fluticasone  1 spray Each Nare Daily PRN HOLLI Monge      glycopyrrolate  1 mg Oral BID (AM & Afternoon) Bora Rosario MD      glycopyrrolate  2 mg Oral HS Bora Rosario MD      haloperidol  1 mg Oral Q6H PRN HOLLI Lion      haloperidol  2.5 mg Oral Q4H PRN Max 4/day Eveline Hangey, CRNP      haloperidol  5 mg Oral Q4H PRN Max 4/day HOLLI Lion      hydroCHLOROthiazide  12.5 mg Oral Daily HOLLI Galvan      hydrocortisone   Topical 4x Daily PRN Eveline  HOLLI Hunt      hydrOXYzine HCL  100 mg Oral Q6H PRN Max 4/day [facility] Cresencioter, DO      Or    LORazepam  2 mg Intramuscular Q6H PRN [facility] Holter, DO      hydrOXYzine HCL  100 mg Oral Q6H PRN Max 4/day HOLLI Lion      Or    LORazepam  2 mg Intramuscular Q6H PRN HOLLI Lion      hydrOXYzine HCL  25 mg Oral Q6H PRN Max 4/day [facility] Cresencioter, DO      ibuprofen  600 mg Oral Q8H PRN HOLLI Lion      influenza vaccine  0.5 mL Intramuscular Once Bora Rosario MD      loratadine  10 mg Oral Daily Brina Guillen MD      magnesium hydroxide  30 mL Oral Once Jourdan Dickens, DO      melatonin  3 mg Oral HS PRN Bora Rosario MD      melatonin  9 mg Oral HS Bora Rosario MD      methocarbamol  500 mg Oral Q6H PRN HOLLI Lion      multivitamin-minerals  1 tablet Oral Daily Eileen CherryHOLLI Jimenez      nicotine polacrilex  2 mg Oral Q4H PRN Bora Rosario MD      OLANZapine  5 mg Oral Q4H PRN Max 3/day [facility] Holter, DO      Or    OLANZapine  2.5 mg Intramuscular Q4H PRN Max 3/day [facility] Holter, DO      OLANZapine  5 mg Oral Q3H PRN Max 3/day [facility] Holter, DO      Or    OLANZapine  5 mg Intramuscular Q3H PRN Max 3/day [facility] Holter, DO      OLANZapine  2.5 mg Oral Q4H PRN Max 6/day [facility] Holter, DO      ondansetron  4 mg Oral Q6H PRN HOLLI Lion      pantoprazole  20 mg Oral [facility] Cresencioter, DO      polyethylene glycol  17 g Oral Daily PRN HOLLI Ghotra      polyethylene glycol  17 g Oral Daily Jourdan Dickens, DO      propranolol  10 mg Oral Q12H KAYLIE HOLLI Lion      senna-docusate sodium  1 tablet Oral Daily PRN Ion  Holter, DO      senna-docusate sodium  2 tablet Oral HS Jourdan Dickens, DO      traZODone  50 mg Oral HS PRN HOLLI Lion      white petrolatum-mineral oil   Topical TID PRN HOLLI Lion         Counseling / Coordination of Care: Total floor / unit time spent today 15 minutes. Greater than 50% of total time was spent with the  patient and / or family counseling and / or somewhat receptive to supportive listening and teaching positive coping skills to deal with symptom mangement.     Patient's Rights, confidentiality and exceptions to confidentiality, use of automated medical record, Behavioral Health Services staff access to medical record, and consent to treatment reviewed.    This note has been dictated and hence there may be problems with punctuation, spelling and formatting and if anyone has any concerns please address them to Dr. Rosario   This note is not shared with patient due to potential for making patient's condition worse by knowing the content of the note.

## 2024-11-11 NOTE — SOCIAL WORK
The patient attended the session and shared that he had a positive weekend due to  surprise visit from family members, including his mother, whom he had not seen in several years. The patient disclosed that his mother [facility] resides [facility] and her presence was particularly meaningful and unexpected. Alongside these family related updates, the patient expressed ongoing distress from persistent AH.   We discussed the significance of his family visit and his emotional response to reconnecting with his mother, focusing on how positive family interactions can support his well-being.   This writer and patient reviewed coping strategies he has been employing to manage his AH. These strategies were reinforced and additional skills for managing distressing symptoms were introduced as needed.   This writer provided education on discharge plan, detailing the services and supports that will be arranged for him upon return to his family.   He expressed understanding and willingness to follow the discharge plan, acknowledging the importance of a successful discharge with appropriate services. In place.

## 2024-11-12 LAB
BASOPHILS # BLD AUTO: 0.06 THOUSANDS/ÂΜL (ref 0–0.1)
BASOPHILS NFR BLD AUTO: 1 % (ref 0–1)
BNP SERPL-MCNC: 12 PG/ML (ref 0–100)
CRP SERPL QL: 13.2 MG/L
EOSINOPHIL # BLD AUTO: 0.41 THOUSAND/ÂΜL (ref 0–0.61)
EOSINOPHIL NFR BLD AUTO: 5 % (ref 0–6)
ERYTHROCYTE [DISTWIDTH] IN BLOOD BY AUTOMATED COUNT: 14 % (ref 11.6–15.1)
HCT VFR BLD AUTO: 43.5 % (ref 36.5–49.3)
HGB BLD-MCNC: 13.2 G/DL (ref 12–17)
IMM GRANULOCYTES # BLD AUTO: 0.03 THOUSAND/UL (ref 0–0.2)
IMM GRANULOCYTES NFR BLD AUTO: 0 % (ref 0–2)
LYMPHOCYTES # BLD AUTO: 3.08 THOUSANDS/ÂΜL (ref 0.6–4.47)
LYMPHOCYTES NFR BLD AUTO: 35 % (ref 14–44)
MCH RBC QN AUTO: 24 PG (ref 26.8–34.3)
MCHC RBC AUTO-ENTMCNC: 30.3 G/DL (ref 31.4–37.4)
MCV RBC AUTO: 79 FL (ref 82–98)
MONOCYTES # BLD AUTO: 0.73 THOUSAND/ÂΜL (ref 0.17–1.22)
MONOCYTES NFR BLD AUTO: 8 % (ref 4–12)
NEUTROPHILS # BLD AUTO: 4.58 THOUSANDS/ÂΜL (ref 1.85–7.62)
NEUTS SEG NFR BLD AUTO: 51 % (ref 43–75)
NRBC BLD AUTO-RTO: 0 /100 WBCS
PLATELET # BLD AUTO: 238 THOUSANDS/UL (ref 149–390)
PMV BLD AUTO: 10.2 FL (ref 8.9–12.7)
RBC # BLD AUTO: 5.51 MILLION/UL (ref 3.88–5.62)
WBC # BLD AUTO: 8.89 THOUSAND/UL (ref 4.31–10.16)

## 2024-11-12 PROCEDURE — 86140 C-REACTIVE PROTEIN: CPT

## 2024-11-12 PROCEDURE — 83880 ASSAY OF NATRIURETIC PEPTIDE: CPT

## 2024-11-12 PROCEDURE — 99232 SBSQ HOSP IP/OBS MODERATE 35: CPT | Performed by: PSYCHIATRY & NEUROLOGY

## 2024-11-12 PROCEDURE — 85025 COMPLETE CBC W/AUTO DIFF WBC: CPT

## 2024-11-12 RX ADMIN — MELATONIN TAB 3 MG 9 MG: 3 TAB at 21:06

## 2024-11-12 RX ADMIN — GLYCOPYRROLATE 1 MG: 1 TABLET ORAL at 14:02

## 2024-11-12 RX ADMIN — LORATADINE 10 MG: 10 TABLET ORAL at 08:37

## 2024-11-12 RX ADMIN — PROPRANOLOL HYDROCHLORIDE 10 MG: 10 TABLET ORAL at 21:06

## 2024-11-12 RX ADMIN — NICOTINE POLACRILEX 2 MG: 2 GUM, CHEWING ORAL at 21:55

## 2024-11-12 RX ADMIN — ATROPINE SULFATE 1 DROP: 10 SOLUTION/ DROPS OPHTHALMIC at 21:07

## 2024-11-12 RX ADMIN — GLYCOPYRROLATE 1 MG: 1 TABLET ORAL at 08:38

## 2024-11-12 RX ADMIN — DOCUSATE SODIUM 100 MG: 100 CAPSULE, LIQUID FILLED ORAL at 17:18

## 2024-11-12 RX ADMIN — POLYETHYLENE GLYCOL 3350 17 G: 17 POWDER, FOR SOLUTION ORAL at 08:37

## 2024-11-12 RX ADMIN — AMLODIPINE BESYLATE 5 MG: 5 TABLET ORAL at 08:37

## 2024-11-12 RX ADMIN — NICOTINE POLACRILEX 2 MG: 2 GUM, CHEWING ORAL at 17:51

## 2024-11-12 RX ADMIN — ESCITALOPRAM OXALATE 20 MG: 10 TABLET, FILM COATED ORAL at 08:37

## 2024-11-12 RX ADMIN — ARIPIPRAZOLE 30 MG: 15 TABLET ORAL at 08:38

## 2024-11-12 RX ADMIN — MULTIPLE VITAMINS W/ MINERALS TAB 1 TABLET: TAB ORAL at 08:38

## 2024-11-12 RX ADMIN — GLYCOPYRROLATE 2 MG: 1 TABLET ORAL at 21:06

## 2024-11-12 RX ADMIN — DOCUSATE SODIUM 100 MG: 100 CAPSULE, LIQUID FILLED ORAL at 08:37

## 2024-11-12 RX ADMIN — CLOZAPINE 225 MG: 25 TABLET ORAL at 21:06

## 2024-11-12 RX ADMIN — SENNOSIDES AND DOCUSATE SODIUM 2 TABLET: 50; 8.6 TABLET ORAL at 21:06

## 2024-11-12 RX ADMIN — NICOTINE POLACRILEX 2 MG: 2 GUM, CHEWING ORAL at 13:45

## 2024-11-12 RX ADMIN — PANTOPRAZOLE SODIUM 20 MG: 20 TABLET, DELAYED RELEASE ORAL at 08:39

## 2024-11-12 RX ADMIN — NICOTINE POLACRILEX 2 MG: 2 GUM, CHEWING ORAL at 09:30

## 2024-11-12 NOTE — PLAN OF CARE
Problem: Alteration in Thoughts and Perception  Goal: Treatment Goal: Gain control of psychotic behaviors/thinking, reduce/eliminate presenting symptoms and demonstrate improved reality functioning upon discharge  Outcome: Progressing  Goal: Verbalize thoughts and feelings  Description: Interventions:  - Promote a nonjudgmental and trusting relationship with the patient through active listening and therapeutic communication  - Assess patient's level of functioning, behavior and potential for risk  - Engage patient in 1 on 1 interactions  - Encourage patient to express fears, feelings, frustrations, and discuss symptoms    - [place] patient to reality, help patient recognize reality-based thinking   - Administer medications as ordered and assess for potential side effects  - Provide the patient education related to the signs and symptoms of the illness and desired effects of prescribed medications  Outcome: Progressing  Goal: Refrain from acting on delusional thinking/internal stimuli  Description: Interventions:  - Monitor patient closely, per order   - Utilize least restrictive measures   - Set reasonable limits, give positive feedback for acceptable   - Administer medications as ordered and monitor of potential side effects  Outcome: Progressing  Goal: Agree to be compliant with medication regime, as prescribed and report medication side effects  Description: Interventions:  - Offer appropriate PRN medication and supervise ingestion; conduct AIMS, as needed   Outcome: Progressing  Goal: Attend and participate in unit activities, including therapeutic, recreational, and educational groups  Description: Interventions:  -Encourage Visitation and family involvement in care  Outcome: Progressing  Goal: Recognize dysfunctional thoughts, communicate reality-based thoughts at the time of discharge  Description: Interventions:  - Provide medication and psycho-education to assist patient in compliance and developing  insight into his/her illness   Outcome: Progressing  Goal: Complete daily ADLs, including personal hygiene independently, as able  Description: Interventions:  - Observe, teach, and assist patient with ADLS  - Monitor and promote a balance of rest/activity, with adequate nutrition and elimination   Outcome: Progressing     Problem: Ineffective Coping  Goal: Identifies ineffective coping skills  Outcome: Progressing     Problem: Depression  Goal: Treatment Goal: Demonstrate behavioral control of depressive symptoms, verbalize feelings of improved mood/affect, and adopt new coping skills prior to discharge  Outcome: Progressing  Goal: Verbalize thoughts and feelings  Description: Interventions:  - Assess and re-assess patient's level of risk   - Engage patient in 1:1 interactions, daily, for a minimum of 15 minutes   - Encourage patient to express feelings, fears, frustrations, hopes   Outcome: Progressing  Goal: Refrain from harming self  Description: Interventions:  - Monitor patient closely, per order   - Supervise medication ingestion, monitor effects and side effects   Outcome: Progressing  Goal: Refrain from isolation  Description: Interventions:  - Develop a trusting relationship   - Encourage socialization   Outcome: Progressing  Goal: Refrain from self-neglect  Outcome: Progressing  Goal: Attend and participate in unit activities, including therapeutic, recreational, and educational groups  Description: Interventions:  - Provide therapeutic and educational activities daily, encourage attendance and participation, and document same in the medical record   Outcome: Progressing  Goal: Complete daily ADLs, including personal hygiene independently, as able  Description: Interventions:  - Observe, teach, and assist patient with ADLS  -  Monitor and promote a balance of rest/activity, with adequate nutrition and elimination   Outcome: Progressing     Problem: Anxiety  Goal: Anxiety is at manageable  level  Description: Interventions:  - Assess and monitor patient's anxiety level.   - Monitor for signs and symptoms (heart palpitations, chest pain, shortness of breath, headaches, nausea, feeling jumpy, restlessness, irritable, apprehensive).   - Collaborate with interdisciplinary team and initiate plan and interventions as ordered.  - [place] patient to unit/surroundings  - Explain treatment plan  - Encourage participation in care  - Encourage verbalization of concerns/fears  - Identify coping mechanisms  - Assist in developing anxiety-reducing skills  - Administer/offer alternative therapies  - Limit or eliminate stimulants  Outcome: Progressing     Problem: Alteration in Orientation  Goal: Treatment Goal: Demonstrate a reduction of confusion and improved orientation to person, place, time and/or situation upon discharge, according to optimum baseline/ability  Outcome: Progressing  Goal: Interact with staff daily  Description: Interventions:  - Assess and re-assess patient's level of orientation  - Engage patient in 1 on 1 interactions, daily, for a minimum of 15 minutes   - Establish rapport/trust with patient   Outcome: Progressing  Goal: Allow medical examinations, as recommended  Description: Interventions:  - Provide physical/neurological exams and/or referrals, per provider   Outcome: Progressing  Goal: Cooperate with recommended testing/procedures  Description: Interventions:  - Determine need for ancillary testing  - Observe for mental status changes  - Implement falls/precaution protocol   Outcome: Progressing  Goal: Attend and participate in unit activities, including therapeutic, recreational, and educational groups  Description: Interventions:  - Provide therapeutic and educational activities daily, encourage attendance and participation, and document same in the medical record   - Provide appropriate opportunities for reminiscence   - Provide a consistent daily routine   - Encourage family  contact/visitation   Outcome: Progressing  Goal: Complete daily ADLs, including personal hygiene independently, as able  Description: Interventions:  - Observe, teach, and assist patient with ADLS  Outcome: Progressing     Problem: DISCHARGE PLANNING - CARE MANAGEMENT  Goal: Discharge to post-acute care or home with appropriate resources  Description: INTERVENTIONS:  - Conduct assessment to determine patient/family and health care team treatment goals, and need for post-acute services based on payer coverage, community resources, and patient preferences, and barriers to discharge  - Address psychosocial, clinical, and financial barriers to discharge as identified in assessment in conjunction with the patient/family and health care team  - Arrange appropriate level of post-acute services according to patient's   needs and preference and payer coverage in collaboration with the physician and health care team  - Communicate with and update the patient/family, physician, and health care team regarding progress on the discharge plan  - Arrange appropriate transportation to post-acute venues  Outcome: Progressing

## 2024-11-12 NOTE — PROGRESS NOTES
Progress Note - Behavioral Health   Name: Alberto Berumen 29 y.o. male I MRN: [id number]  Unit/Bed#: [facility] 112-02 I Date of Admission: 3/29/2024   Date of Service: 11/12/2024 I Hospital Day: 228     Assessment & Plan  Schizoaffective disorder, bipolar type (HCC)  Reviewed on 11/12/24    Patient reports continued depression and anxiety, as well as symptoms consistent with AH. He notes that the voices threaten to harm him and his family. Patient denies any SI and HI, and denies symptoms consistent with VH. Patient notes persecutory delusions in regards to the voices he is hearing, but he denies delusions of grandiose, referential, or bizarre nature, and does not appear to be responding to internal stimuli.     Continue Clozapine 225 mg qhs for psychosis -- to consider further careful titration for Sxs depending on response  Continue to obtain CBC with diff, CRP, and CK (weekly on Tues), and a Clozapine level and BNP (every 2 weeks on Tues)    Continue Abilify 30 mg qd for psychosis  Continue Escitalopram 20 mg qd for depression and anxiety  Continue Senna-Docusate sodium one 50 mg tablet daily before bed for constipation  - decreased from two tablets daily on 11/3 due to reported loose stools  Continue propanolol 10 mg bid for anxiety   Continue amlodipine 5 mg PO daily     Pt remains on dual antipsychotic Tx due to failure on monotherapy      [facility] team referral was made  No associated orders from this encounter found during lookback period of 72 hours.    Chronic idiopathic constipation  Reviewed on 11/12/24  Follows with medical  Continue senna-docusate sodium dose two tablets before bed per medical team.   - Patient dose was decreased to one tablet nightly on 11/3 due to reports of loose stools. Patient intends to discuss changing this back to two tablets today (11/5) with nursing staff.   No associated orders from this encounter found during lookback period of 72 hours.  GERD (gastroesophageal reflux  "disease)  Reviewed on 11/12/24  Follows with medical  Continue famotidine 20 mg tablet BID per medical team   Continue glycopyrrolate 1 mg tablet BID AM and afternoon per medical team  Continue glycopyrrolate 2 mg tablet before bed per medical team  Continue pantoprazole EC 20 mg tablet every morning per medical team  No associated orders from this encounter found during lookback period of 72 hours.  Medical clearance for psychiatric admission  Reviewed on 11/12/24  Ongoing by medical  No associated orders from this encounter found during lookback period of 72 hours.    Tobacco abuse  Reviewed on 11/12/24  Continue to encourage cessation upon discharge  No associated orders from this encounter found during lookback period of 72 hours.  Elevated hemoglobin A1c  Reviewed on 11/12/24  Follows with medical  No associated orders from this encounter found during lookback period of 72 hours.  Class 2 obesity in adult    No associated orders from this encounter found during lookback period of 72 hours.    Primary hypertension    No associated orders from this encounter found during lookback period of 72 hours.                [facility]  Psychiatric Progress Note - Department of Behavioral Health   Alberto Berumen 29 y.o. male MRN: [id number]  Unit/Bed#: [facility] 112-02 Encounter: [id number]    SUBJECTIVE     Patient evaluated this a.m. for continuity of care. Patient was discussed at length with nursing and treatment team. Per nursing, pt has been pleasant and cooperative over the past 24 hours. He had attended 9/10 groups yesterday. Pt to have labs drawn today. Patient doing well on the unit.      During evaluation, no acute distress is noted. This morning the patient reports feeling \"sad and depressed.\" He states that he is sad and depressed about the voices that he is hearing. He states that they are telling him that \"he deserves to die,\" \"that the voices are gonna kill me,\" and \"that I am going to " "hell.\" He states that he has been hearing the voices every minute of the day for the past three years, and that going to the gym and wearing headphones typically make the voices go away.  He states that there are a few different voices that talk to him, and he does recognize some of them. He endorses some paranoia and concern about the voices harming him and his family. Pt also reports anxiety about his living situation after discharge, and where he will get money after discharge. He has been talking with both his sister and his aunt, and last spoke with them yesterday. He denies any visual hallucinations. He denies having any suicidal ideation or intent over the past 24 hours. He denies any homicidal ideation or intent over the past 24 hours. He states his anxiety and depression are around a 7/10, which is where they typically are. He attended 9/10 groups yesterday, and he likes participating in them. He also likes speaking with other patients on the floor. He reports eating and sleeping well, but he has not been eating breakfast. He denies any new symptoms or side effects over the last 24 hours.       PSYCHIATRIC REVIEW OF SYSTEMS     Behavior over the last 24 hours:  stable  Sleep: normal  Appetite: normal  Medication side effects: No    Progress Toward Goals: Yes, as evidenced by their participation (or lack thereof) in individual, social and therapeutic milieu in addition to adherence to their medication regimen.  Patient Acceptance: Pt accepting that treatment is needed.   Patient Understanding: Pt understanding of his need for inpatient treatment and medication management.   Patient Involvement in Reintegration Process: Patient has been talking with his aunt and his sister about discharge plans.   Patient's Therapeutic Relationship with Psychiatrist: Pt pleasant and cooperative with the psychiatry team.   Patient's Optimism Regarding Recovery: Patient not very optimistic regarding recovery.   Group " Attendance: Pt attended 9/10 groups yesterday. Also present on the floor and engaged with peers.     REVIEW OF SYSTEMS   Review of systems:  Full ROS performed, no positive findings.    OBJECTIVE     Vital Signs in Past 24 Hours:  Temp:  [95.5 °F (35.3 °C)-98.4 °F (36.9 °C)] 95.5 °F (35.3 °C)  HR:  [90-93] 93  BP: (112-142)/(71-83) 112/71  Resp:  [18] 18  SpO2:  [99 %] 99 %  O2 Device: None (Room air)    Intake/Output in Past 24 hours:  No intake/output data recorded.  No intake/output data recorded.    Laboratory Results:  I have personally reviewed all pertinent laboratory/tests results.    Behavioral Health Medications: all current active meds have been reviewed.    Current Facility-Administered Medications   Medication Dose Route Frequency Provider Last Rate    acetaminophen  650 mg Oral Q4H PRN Jordan C Holter, DO      acetaminophen  650 mg Oral Q6H PRN HOLLI Lion      aluminum-magnesium hydroxide-simethicone  30 mL Oral Q4H PRN Jordan C Holter, DO      amLODIPine  5 mg Oral Daily HOLLI Lion      ARIPiprazole  30 mg Oral Daily Bora Rosario MD      Artificial Tears  1 drop Both Eyes Q3H PRN Jordan C Holter, DO      atropine  1 drop Sublingual HS Harjeet Torrse, DO      haloperidol lactate  2.5 mg Intramuscular Q4H PRN Max 4/day STEVE LionNP      And    LORazepam  1 mg Intramuscular Q4H PRN Max 4/day HOLLI Lion      And    benztropine  0.5 mg Intramuscular Q4H PRN Max 4/day HOLLI Lion      benztropine  1 mg Intramuscular Q4H PRN Max 6/day Jordan C Holter, DO      haloperidol lactate  5 mg Intramuscular Q4H PRN Max 4/day HOLLI Lion      And    LORazepam  2 mg Intramuscular Q4H PRN Max 4/day HOLLI Lion      And    benztropine  1 mg Intramuscular Q4H PRN Max 4/day HOLLI Lion      benztropine  1 mg Oral Q4H PRN Max 6/day HOLLI Lion      benztropine  1 mg Oral Q4H PRN Max 6/day Jordan C Holter,       bisacodyl  10 mg Rectal Daily PRN Eveline  Gilbert, HOLLI      calcium carbonate  500 mg Oral BID PRN Eileenaddie Jensen, STEVENP      cloZAPine  225 mg Oral HS Hardik So MD      hydrOXYzine HCL  50 mg Oral Q6H PRN Max 4/day Ion FISCHER Holter, DO      Or    diphenhydrAMINE  50 mg Intramuscular Q6H PRN Ion FISCHER Holter, DO      hydrOXYzine HCL  50 mg Oral Q6H PRN Max 4/day HOLLI Lion      Or    diphenhydrAMINE  50 mg Intramuscular Q6H PRN STEVE LionNP      diphenhydrAMINE-zinc acetate   Topical BID PRN Eveline Hunt, HOLLI      docusate sodium  100 mg Oral BID Jourdan Dickens, DO      escitalopram  20 mg Oral Daily HOLLI Lion      famotidine  20 mg Oral BID PRN EileenHOLLI Cummins      fluticasone  1 spray Each Nare Daily PRN HOLLI Monge      glycopyrrolate  1 mg Oral BID (AM & Afternoon) Bora Rosario MD      glycopyrrolate  2 mg Oral HS Bora Rosario MD      haloperidol  1 mg Oral Q6H PRN HOLLI Lion      haloperidol  2.5 mg Oral Q4H PRN Max 4/day HOLLI Lion      haloperidol  5 mg Oral Q4H PRN Max 4/day HOLLI Lion      hydroCHLOROthiazide  12.5 mg Oral Daily Pia Mantilla, HOLLI      hydrocortisone   Topical 4x Daily PRN HOLLI Lion      hydrOXYzine HCL  100 mg Oral Q6H PRN Max 4/day Ion Dupontter, DO      Or    LORazepam  2 mg Intramuscular Q6H PRN Ion FISCHER Holter, DO      hydrOXYzine HCL  100 mg Oral Q6H PRN Max 4/day HOLLI Lion      Or    LORazepam  2 mg Intramuscular Q6H PRN HOLLI Lion      hydrOXYzine HCL  25 mg Oral Q6H PRN Max 4/day Ion FISCHER Holter, DO      ibuprofen  600 mg Oral Q8H PRN HOLLI Lion      influenza vaccine  0.5 mL Intramuscular Once Bora Rosario MD      loratadine  10 mg Oral Daily Brina Guillen MD      magnesium hydroxide  30 mL Oral Once Jourdan Dickens, DO      melatonin  3 mg Oral HS PRN Bora Rosario MD      melatonin  9 mg Oral HS Bora Rosario MD      methocarbamol  500 mg Oral Q6H PRN HOLLI Lion    "   multivitamin-minerals  1 tablet Oral Daily Eileen Holliday Mikey, CRJENNIE      nicotine polacrilex  2 mg Oral Q4H PRN Bora Rosario MD      OLANZapine  5 mg Oral Q4H PRN Max 3/day [facility] Cresencioter, DO      Or    OLANZapine  2.5 mg Intramuscular Q4H PRN Max 3/day [facility] Holter, DO      OLANZapine  5 mg Oral Q3H PRN Max 3/day [facility] Holter, DO      Or    OLANZapine  5 mg Intramuscular Q3H PRN Max 3/day [facility] Holter, DO      OLANZapine  2.5 mg Oral Q4H PRN Max 6/day [facility] Holter, DO      ondansetron  4 mg Oral Q6H PRN Eveline Hunt, HOLLI      pantoprazole  20 mg Oral QAM [facility] Cresencioter, DO      polyethylene glycol  17 g Oral Daily PRN Sofi Yoo, STEVENP      polyethylene glycol  17 g Oral Daily Jourdan Dickens, DO      propranolol  10 mg Oral Q12H KAYLIE HOLLI Lion      senna-docusate sodium  1 tablet Oral Daily PRN [facility] Holter, DO      senna-docusate sodium  2 tablet Oral HS Jourdan Dickens, DO      traZODone  50 mg Oral HS PRN HOLLI Lion      white petrolatum-mineral oil   Topical TID PRN HOLLI Lion         Risks, benefits and possible side effects of Medications:   Risks, benefits, and possible side effects of medications explained to patient and patient verbalizes understanding.        Mental Status Evaluation:  Appearance:  Overtly appearing, , overweight manage appropriate, dreadlock hair down to shoulders, clean shaving, wearing sweat pants and a sweatshirt. Pt was in the cafeteria during duration of interview.    Behavior:  normal, pleasant, cooperative   Speech:  normal rate, normal volume, normal pitch   Mood:  \"Sad and depressed.\"   Affect:  Constricted-depressed   Thought Process:  Paranoid   Associations: intact associations   Thought Content:  Paranoia   Perceptual Disturbances: Auditory hallucinations heard consistently, denies visual hallucinations. No response to internal stimuli during interview.    Risk Potential: Suicidal ideation -  denies " suicidal ideation over the past 24 hours  Homicidal ideation - None, denies homicidal ideation over the past 24 hours  Potential for aggression - No   Sensorium:  oriented to person, place, and time/date   Memory:  recent and remote memory grossly intact   Consciousness:  alert and awake   Attention/Concentration: attention span and concentration are age appropriate   Insight:  fair   Judgment: fair   Gait/Station: Not assessed   Motor Activity: no abnormal movements     Recommended Treatment:   See above for assessment and plan.    Inpatient Psychiatric Certification:  Patient is not at goal. They are not yet ready for discharge. The patient's condition currently requires active psychopharmacological medication management, interdisciplinary coordination with case management, and the utilization of adjunctive milieu and group therapy to augment psychopharmacological efficacy. The patient's risk of morbidity, and progression or decompensation of psychiatric disease, is higher without this current treatment. Based upon physical, mental and social evaluations, I certify that inpatient psychiatric services are medically necessary for this patient for the treatment of Schizoaffective disorder, bipolar type (HCC)    Counseling/Coordination of Care    Total unit time spent today was greater than 15 minutes. Greater than 50% of total time was spent with the patient and/or patient's relatives and/or coordination of patient's care.     Patient's rights, confidentiality, exceptions to confidentiality, use of electronic medical record including appropriate staff access to medical record regarding behavioral health services and consent to treatment were reviewed.    Note Share:     This note was not shared with the patient due to reasonable likelihood of causing patient harm     Khoa Cortez   Psychiatry, PGY-4

## 2024-11-12 NOTE — NURSING NOTE
Attempt was made to obtain labs:C-reactive protein, CBC, BNP, were unsuccessful at this time.  Will try again later.

## 2024-11-12 NOTE — NURSING NOTE
Alberto maintained on ongoing SAFE precaution without incident on this shift. Awake, alert,,pleasant and cooperative.  Engage with selective peers.  Attended and participated in 9 out of 10 groups today.  Continues to be compliant with medication regimen,.  Denies all psych symptoms.

## 2024-11-12 NOTE — PROGRESS NOTES
11/12/24 1046   Team Meeting   Meeting Type Tx Team Meeting   Initial Conference Date 11/12/24   Next Conference Date 11/25/24   Team Members Present   Team Members Present Physician;Nurse;;Other (Discipline and Name)   Physician Team Member Abraham   Nursing Team Member Claudia   Social Work Team Member Jose J ORTEGA   Other (Discipline and Name) Gaby CMP MHDS, Yadira CMP Housing, Geronimo CC   Patient/Family Present   Patient Present Yes   Patient's Family Present No   OTHER   Team Meeting - Additional Comments Patient attened his treatment team meeting. Patient did not complete his self assessment. Patient discussed continued AH which are distressing. Psychiatrist discussed patient will continue to use coping skills to manage the voices. Psychiatrist acknowledged voices are distressing for the patient but he's safe to be discharged home. [facility] reports they are awaiting [facility] to state when they had an opening on their [facility] funded wait list. This writer will contact [facility] and obtain additional information regarding their list.

## 2024-11-12 NOTE — ASSESSMENT & PLAN NOTE
Reviewed on 11/12/24  Ongoing by medical  No associated orders from this encounter found during lookback period of 72 hours.

## 2024-11-12 NOTE — ASSESSMENT & PLAN NOTE
Reviewed on 11/12/24  Follows with medical  No associated orders from this encounter found during lookback period of 72 hours.

## 2024-11-12 NOTE — PROGRESS NOTES
11/12/24 1049   Team Meeting   Meeting Type Tx Team Meeting   Initial Conference Date 11/12/24   Next Conference Date 12/12/24   Team Members Present   Team Members Present Physician;Nurse;;Other (Discipline and Name)   Physician Team Member Abraham   Nursing Team Member Claudia   Social Work Team Member Jose J ORTEGA   Patient/Family Present   Patient Present Yes   Patient's Family Present No     Treatment team and patient reviewed his updated treatment plan. Patient signed treatment plan.

## 2024-11-12 NOTE — ASSESSMENT & PLAN NOTE
Reviewed on 11/12/24    Patient reports continued depression and anxiety, as well as symptoms consistent with AH. He notes that the voices threaten to harm him and his family. Patient denies any SI and HI, and denies symptoms consistent with VH. Patient notes persecutory delusions in regards to the voices he is hearing, but he denies delusions of grandiose, referential, or bizarre nature, and does not appear to be responding to internal stimuli.     Continue Clozapine 225 mg qhs for psychosis -- to consider further careful titration for Sxs depending on response  Continue to obtain CBC with diff, CRP, and CK (weekly on Tues), and a Clozapine level and BNP (every 2 weeks on Tues)    Continue Abilify 30 mg qd for psychosis  Continue Escitalopram 20 mg qd for depression and anxiety  Continue Senna-Docusate sodium one 50 mg tablet daily before bed for constipation  - decreased from two tablets daily on 11/3 due to reported loose stools  Continue propanolol 10 mg bid for anxiety   Continue amlodipine 5 mg PO daily     Pt remains on dual antipsychotic Tx due to failure on monotherapy      [facility] team referral was made  No associated orders from this encounter found during lookback period of 72 hours.

## 2024-11-12 NOTE — PROGRESS NOTES
11/12/24 0828   Team Meeting   Meeting Type Daily Rounds   Team Members Present   Team Members Present Physician;Nurse;;Other (Discipline and Name)   Physician Team Member Holter, Thomas   Nursing Team Member Claudia   Social Work Team Member Jose J ORTEGA   Other (Discipline and Name) Wang PCM   Patient/Family Present   Patient Present No   Patient's Family Present No     Groups Participation  9/10.   Expected D/C Date:  He's compliant with medications. He's engaged in his treatment. Socializes with his peers. He was pleasant and cooperative. Sad regarding his peers passing.

## 2024-11-12 NOTE — NURSING NOTE
Patient is visible and interacts with peers and staff in milieu. He is cooperative. He states he has anxiety and depression along with auditory hallucinations. He is compliant with medications. He attended groups. His appetite is decreased.

## 2024-11-12 NOTE — ASSESSMENT & PLAN NOTE
Reviewed on 11/12/24  Continue to encourage cessation upon discharge  No associated orders from this encounter found during lookback period of 72 hours.

## 2024-11-12 NOTE — ASSESSMENT & PLAN NOTE
Reviewed on 11/12/24  Follows with medical  Continue famotidine 20 mg tablet BID per medical team   Continue glycopyrrolate 1 mg tablet BID AM and afternoon per medical team  Continue glycopyrrolate 2 mg tablet before bed per medical team  Continue pantoprazole EC 20 mg tablet every morning per medical team  No associated orders from this encounter found during lookback period of 72 hours.

## 2024-11-12 NOTE — ASSESSMENT & PLAN NOTE
Reviewed on 11/12/24  Follows with medical  Continue senna-docusate sodium dose two tablets before bed per medical team.   - Patient dose was decreased to one tablet nightly on 11/3 due to reports of loose stools. Patient intends to discuss changing this back to two tablets today (11/5) with nursing staff.   No associated orders from this encounter found during lookback period of 72 hours.

## 2024-11-13 PROCEDURE — 99232 SBSQ HOSP IP/OBS MODERATE 35: CPT | Performed by: PSYCHIATRY & NEUROLOGY

## 2024-11-13 RX ADMIN — CLOZAPINE 225 MG: 25 TABLET ORAL at 21:10

## 2024-11-13 RX ADMIN — ARIPIPRAZOLE 30 MG: 15 TABLET ORAL at 08:04

## 2024-11-13 RX ADMIN — DOCUSATE SODIUM 100 MG: 100 CAPSULE, LIQUID FILLED ORAL at 17:16

## 2024-11-13 RX ADMIN — NICOTINE POLACRILEX 2 MG: 2 GUM, CHEWING ORAL at 21:11

## 2024-11-13 RX ADMIN — MELATONIN TAB 3 MG 9 MG: 3 TAB at 21:11

## 2024-11-13 RX ADMIN — GLYCOPYRROLATE 1 MG: 1 TABLET ORAL at 08:03

## 2024-11-13 RX ADMIN — ESCITALOPRAM OXALATE 20 MG: 10 TABLET, FILM COATED ORAL at 08:05

## 2024-11-13 RX ADMIN — PANTOPRAZOLE SODIUM 20 MG: 20 TABLET, DELAYED RELEASE ORAL at 08:05

## 2024-11-13 RX ADMIN — PROPRANOLOL HYDROCHLORIDE 10 MG: 10 TABLET ORAL at 21:10

## 2024-11-13 RX ADMIN — HYDROCHLOROTHIAZIDE 12.5 MG: 12.5 TABLET ORAL at 08:04

## 2024-11-13 RX ADMIN — NICOTINE POLACRILEX 2 MG: 2 GUM, CHEWING ORAL at 12:39

## 2024-11-13 RX ADMIN — GLYCOPYRROLATE 2 MG: 1 TABLET ORAL at 21:10

## 2024-11-13 RX ADMIN — DOCUSATE SODIUM 100 MG: 100 CAPSULE, LIQUID FILLED ORAL at 08:05

## 2024-11-13 RX ADMIN — NICOTINE POLACRILEX 2 MG: 2 GUM, CHEWING ORAL at 17:00

## 2024-11-13 RX ADMIN — PROPRANOLOL HYDROCHLORIDE 10 MG: 10 TABLET ORAL at 08:05

## 2024-11-13 RX ADMIN — MULTIPLE VITAMINS W/ MINERALS TAB 1 TABLET: TAB ORAL at 08:06

## 2024-11-13 RX ADMIN — NICOTINE POLACRILEX 2 MG: 2 GUM, CHEWING ORAL at 08:04

## 2024-11-13 RX ADMIN — ATROPINE SULFATE 1 DROP: 10 SOLUTION/ DROPS OPHTHALMIC at 21:12

## 2024-11-13 RX ADMIN — LORATADINE 10 MG: 10 TABLET ORAL at 08:03

## 2024-11-13 RX ADMIN — SENNOSIDES AND DOCUSATE SODIUM 2 TABLET: 50; 8.6 TABLET ORAL at 21:11

## 2024-11-13 NOTE — ASSESSMENT & PLAN NOTE
Reviewed on 11/13/24    Patient reports continued depression and anxiety, as well as symptoms consistent with AH. He notes that the voices threaten to harm him and his family. Patient denies any SI and HI, and denies symptoms consistent with VH. Patient notes persecutory delusions in regards to the voices he is hearing, but he denies delusions of grandiose, referential, or bizarre nature, and does not appear to be responding to internal stimuli.     Continue Clozapine 225 mg qhs for psychosis -- to consider further careful titration for Sxs depending on response  Continue to obtain CBC with diff, CRP, and CK (weekly on Tues), and a Clozapine level and BNP (every 2 weeks on Tues)    Continue Abilify 30 mg qd for psychosis  Continue Escitalopram 20 mg qd for depression and anxiety  Continue Senna-Docusate sodium one 50 mg tablet daily before bed for constipation  - decreased from two tablets daily on 11/3 due to reported loose stools  Continue propanolol 10 mg bid for anxiety   Continue amlodipine 5 mg PO daily     Pt remains on dual antipsychotic Tx due to failure on monotherapy      [facility] team referral was made  No associated orders from this encounter found during lookback period of 72 hours.

## 2024-11-13 NOTE — PLAN OF CARE
Problem: Ineffective Coping  Goal: Identifies ineffective coping skills  Outcome: Progressing  Goal: Identifies healthy coping skills  Outcome: Progressing  Goal: Demonstrates healthy coping skills  Outcome: Progressing  Goal: Participates in unit activities  Description: Interventions:  - Provide therapeutic environment   - Provide required programming   - Redirect inappropriate behaviors   Outcome: Progressing   Alberto has continue making progressed towards the goals by attending groups and actively engaging.

## 2024-11-13 NOTE — ASSESSMENT & PLAN NOTE
Reviewed on 11/13/24  Follows with medical  No associated orders from this encounter found during lookback period of 72 hours.

## 2024-11-13 NOTE — NURSING NOTE
Patient is visible on unit, pleasant and polite with staff and peers. Pt asks for nicotine gum otherwise minimal needs required. Pt reports no s/s,takes medications without issue.

## 2024-11-13 NOTE — NURSING NOTE
Alberto has been visible in the milieu. Social with staff and select peers. Admits to anxiety, depression and voices. Participated in most groups this evening. Ate snack. Took HS medication without difficulty. No issues or behaviors. Continue to monitor. Precautions maintained.

## 2024-11-13 NOTE — ASSESSMENT & PLAN NOTE
Reviewed on 11/13/24  Ongoing by medical  No associated orders from this encounter found during lookback period of 72 hours.

## 2024-11-13 NOTE — PLAN OF CARE
Problem: Alteration in Thoughts and Perception  Goal: Treatment Goal: Gain control of psychotic behaviors/thinking, reduce/eliminate presenting symptoms and demonstrate improved reality functioning upon discharge  Outcome: Progressing  Goal: Refrain from acting on delusional thinking/internal stimuli  Description: Interventions:  - Monitor patient closely, per order   - Utilize least restrictive measures   - Set reasonable limits, give positive feedback for acceptable   - Administer medications as ordered and monitor of potential side effects  Outcome: Progressing  Goal: Agree to be compliant with medication regime, as prescribed and report medication side effects  Description: Interventions:  - Offer appropriate PRN medication and supervise ingestion; conduct AIMS, as needed   Outcome: Progressing  Goal: Attend and participate in unit activities, including therapeutic, recreational, and educational groups  Description: Interventions:  -Encourage Visitation and family involvement in care  Outcome: Progressing  Goal: Recognize dysfunctional thoughts, communicate reality-based thoughts at the time of discharge  Description: Interventions:  - Provide medication and psycho-education to assist patient in compliance and developing insight into his/her illness   Outcome: Progressing  Goal: Complete daily ADLs, including personal hygiene independently, as able  Description: Interventions:  - Observe, teach, and assist patient with ADLS  - Monitor and promote a balance of rest/activity, with adequate nutrition and elimination   Outcome: Progressing     Problem: Ineffective Coping  Goal: Identifies ineffective coping skills  Outcome: Progressing  Goal: Identifies healthy coping skills  Outcome: Progressing  Goal: Demonstrates healthy coping skills  Outcome: Progressing  Goal: Participates in unit activities  Description: Interventions:  - Provide therapeutic environment   - Provide required programming   - Redirect  inappropriate behaviors   Outcome: Progressing     Problem: Depression  Goal: Treatment Goal: Demonstrate behavioral control of depressive symptoms, verbalize feelings of improved mood/affect, and adopt new coping skills prior to discharge  Outcome: Progressing  Goal: Refrain from isolation  Description: Interventions:  - Develop a trusting relationship   - Encourage socialization   Outcome: Progressing  Goal: Refrain from self-neglect  Outcome: Progressing  Goal: Attend and participate in unit activities, including therapeutic, recreational, and educational groups  Description: Interventions:  - Provide therapeutic and educational activities daily, encourage attendance and participation, and document same in the medical record   Outcome: Progressing     Problem: Anxiety  Goal: Anxiety is at manageable level  Description: Interventions:  - Assess and monitor patient's anxiety level.   - Monitor for signs and symptoms (heart palpitations, chest pain, shortness of breath, headaches, nausea, feeling jumpy, restlessness, irritable, apprehensive).   - Collaborate with interdisciplinary team and initiate plan and interventions as ordered.  - [place] patient to unit/surroundings  - Explain treatment plan  - Encourage participation in care  - Encourage verbalization of concerns/fears  - Identify coping mechanisms  - Assist in developing anxiety-reducing skills  - Administer/offer alternative therapies  - Limit or eliminate stimulants  Outcome: Progressing     Problem: DISCHARGE PLANNING - CARE MANAGEMENT  Goal: Discharge to post-acute care or home with appropriate resources  Description: INTERVENTIONS:  - Conduct assessment to determine patient/family and health care team treatment goals, and need for post-acute services based on payer coverage, community resources, and patient preferences, and barriers to discharge  - Address psychosocial, clinical, and financial barriers to discharge as identified in assessment in  conjunction with the patient/family and health care team  - Arrange appropriate level of post-acute services according to patient's   needs and preference and payer coverage in collaboration with the physician and health care team  - Communicate with and update the patient/family, physician, and health care team regarding progress on the discharge plan  - Arrange appropriate transportation to post-acute venues  Outcome: Progressing

## 2024-11-13 NOTE — ASSESSMENT & PLAN NOTE
Reviewed on 11/13/24  Continue to encourage cessation upon discharge  No associated orders from this encounter found during lookback period of 72 hours.

## 2024-11-13 NOTE — PROGRESS NOTES
11/13/24 0804   Team Meeting   Meeting Type Daily Rounds   Team Members Present   Team Members Present Physician;Nurse;;Other (Discipline and Name)   Physician Team Member Holter, Thomas   Nursing Team Member Claudia   Social Work Team Member Jose J ORTEGA   Other (Discipline and Name) Wang PCM   Patient/Family Present   Patient Present No   Patient's Family Present No     Groups Participation  9/11.   He's engaged in his treatment. He's appropriate and socializes with his peers. ACT services at discharge. Pending [facility] funding for ACT. D/C home when funding approved.    No faxes for pt in provider bin yet. Called patient to inform she will need a pre-op appointment to be cleared for surgery per [facility]. Patient states that they accepted her latest blood work and EKG and she would only need clearance from provider. I told her I would send message to phone room to try and get her in on Monday but I am not certain that anyone would be available or that it would be good enough for a clearance. Pt verbalized understanding.

## 2024-11-13 NOTE — PROGRESS NOTES
Progress Note - Behavioral Health   Name: Alberto Berumen 29 y.o. male I MRN: [id number]  Unit/Bed#: [facility] 112-02 I Date of Admission: 3/29/2024   Date of Service: 11/13/2024 I Hospital Day: 229     Assessment & Plan  Schizoaffective disorder, bipolar type (HCC)  Reviewed on 11/13/24    Patient reports continued depression and anxiety, as well as symptoms consistent with AH. He notes that the voices threaten to harm him and his family. Patient denies any SI and HI, and denies symptoms consistent with VH. Patient notes persecutory delusions in regards to the voices he is hearing, but he denies delusions of grandiose, referential, or bizarre nature, and does not appear to be responding to internal stimuli.     Continue Clozapine 225 mg qhs for psychosis -- to consider further careful titration for Sxs depending on response  Continue to obtain CBC with diff, CRP, and CK (weekly on Tues), and a Clozapine level and BNP (every 2 weeks on Tues)    Continue Abilify 30 mg qd for psychosis  Continue Escitalopram 20 mg qd for depression and anxiety  Continue Senna-Docusate sodium one 50 mg tablet daily before bed for constipation  - decreased from two tablets daily on 11/3 due to reported loose stools  Continue propanolol 10 mg bid for anxiety   Continue amlodipine 5 mg PO daily     Pt remains on dual antipsychotic Tx due to failure on monotherapy      ACT team referral was made  No associated orders from this encounter found during lookback period of 72 hours.    Chronic idiopathic constipation  Reviewed on 11/13/24  Follows with medical  Continue senna-docusate sodium dose two tablets before bed per medical team.   - Patient dose was decreased to one tablet nightly on 11/3 due to reports of loose stools. Patient intends to discuss changing this back to two tablets today (11/5) with nursing staff.   No associated orders from this encounter found during lookback period of 72 hours.  GERD (gastroesophageal reflux  "disease)  Reviewed on 11/13/24  Follows with medical  Continue famotidine 20 mg tablet BID per medical team   Continue glycopyrrolate 1 mg tablet BID AM and afternoon per medical team  Continue glycopyrrolate 2 mg tablet before bed per medical team  Continue pantoprazole EC 20 mg tablet every morning per medical team  No associated orders from this encounter found during lookback period of 72 hours.  Medical clearance for psychiatric admission  Reviewed on 11/13/24  Ongoing by medical  No associated orders from this encounter found during lookback period of 72 hours.    Tobacco abuse  Reviewed on 11/13/24  Continue to encourage cessation upon discharge  No associated orders from this encounter found during lookback period of 72 hours.  Elevated hemoglobin A1c  Reviewed on 11/13/24  Follows with medical  No associated orders from this encounter found during lookback period of 72 hours.  Class 2 obesity in adult    No associated orders from this encounter found during lookback period of 72 hours.    Primary hypertension    No associated orders from this encounter found during lookback period of 72 hours.          [facility]  Psychiatric Progress Note - Department of Behavioral Health   Alberto Berumen 29 y.o. male MRN: [id number]  Unit/Bed#: [facility] 112-02 Encounter: [id number]    SUBJECTIVE     Patient evaluated this a.m. for continuity of care. Patient was discussed at length with nursing and treatment team. Per nursing, his is pleasant, cooperative, and engaged on the floor. He got his labs yesterday, and his ANC was 4.58, and his CRP was 13.2. He attended 7/11 groups yesterday. He did not require any prn medications over the past 24 hours.     During evaluation, no acute distress is noted. This morning, the patient reports feeling \"alright, but stressed.\" He reports the voices he is hearing, and his living and financial situation after discharge are causing him to be stressed. Since " yesterday, voices are still saying the same thing, and he is still consistently hearing them, and he is anxious that they can harm him or his family. He states that his depression and anxiety are still at a 7/10 and stable. He denies any suicidal or homicidal ideation over the past 24 hours. He denies seeing any visual hallucinations. He denies any agitation or aggression. He reports getting a good sleep yesterday, and he endorses having a good appetite. He is not eating breakfast to try to lose weight. He enjoys communicating with the other patients on the floor. He was talking with other patients when approached for interview this morning. He got to watch basketball on the television last night, and he enjoyed watching the game. He has been in communication with both his aunt and his sister about living situation after discharge. He denies any new symptoms or side effects over the past 24 hours.      PSYCHIATRIC REVIEW OF SYSTEMS     Behavior over the last 24 hours:  stable  Sleep: normal  Appetite: normal  Medication side effects: No    Progress Toward Goals: Yes, as evidenced by their participation (or lack thereof) in individual, social and therapeutic milieu in addition to adherence to their medication regimen.  Patient Acceptance: Pt accepting that he needs treatment, he is also working towards developing good coping skills to handle the voices.   Patient Understanding: Pt understanding of his need for inpatient treatment and medication management.   Patient Involvement in Reintegration Process: Pt has been communicating with his aunt and his sister about living situation after discharge.   Patient's Therapeutic Relationship with Psychiatrist: Pt pleasant and cooperative with the psychiatry team.   Patient's Optimism Regarding Recovery: Pt not optimistic regarding recovry, but understands that he needs to develop better coping mechanisms to handle the constant voices.   Group Attendance: 7/11    REVIEW OF  SYSTEMS   Review of systems: Full ROS performed with no positive findings.     OBJECTIVE     Vital Signs in Past 24 Hours:  Temp:  [97.5 °F (36.4 °C)-97.9 °F (36.6 °C)] 97.9 °F (36.6 °C)  HR:  [] 88  BP: (117-129)/(59-74) 117/59  Resp:  [18] 18  SpO2:  [97 %-98 %] 98 %  O2 Device: None (Room air)    Intake/Output in Past 24 hours:  No intake/output data recorded.  No intake/output data recorded.        Laboratory Results:  I have personally reviewed all pertinent laboratory/tests results.    Behavioral Health Medications: all current active meds have been reviewed.    Current Facility-Administered Medications   Medication Dose Route Frequency Provider Last Rate    acetaminophen  650 mg Oral Q4H PRN Jordan C Holter,       acetaminophen  650 mg Oral Q6H PRN HOLLI Lion      aluminum-magnesium hydroxide-simethicone  30 mL Oral Q4H PRN Jordan C Holter,       amLODIPine  5 mg Oral Daily HOLLI Lion      ARIPiprazole  30 mg Oral Daily Bora Rosario MD      Artificial Tears  1 drop Both Eyes Q3H PRN Jordan C Holter, DO      atropine  1 drop Sublingual  Harjeet Torres, DO      haloperidol lactate  2.5 mg Intramuscular Q4H PRN Max 4/day HOLLI Lion      And    LORazepam  1 mg Intramuscular Q4H PRN Max 4/day HOLLI Lion      And    benztropine  0.5 mg Intramuscular Q4H PRN Max 4/day HOLLI Lion      benztropine  1 mg Intramuscular Q4H PRN Max 6/day Jordan C Holter, DO      haloperidol lactate  5 mg Intramuscular Q4H PRN Max 4/day HOLLI Lion      And    LORazepam  2 mg Intramuscular Q4H PRN Max 4/day HOLLI Lion      And    benztropine  1 mg Intramuscular Q4H PRN Max 4/day HOLLI Lion      benztropine  1 mg Oral Q4H PRN Max 6/day HOLLI Lion      benztropine  1 mg Oral Q4H PRN Max 6/day Jordan C Holter,       bisacodyl  10 mg Rectal Daily PRN HOLLI Lion      calcium carbonate  500 mg Oral BID PRN HOLLI Monge      cloZAPine   225 mg Oral HS Hardik So MD      hydrOXYzine HCL  50 mg Oral Q6H PRN Max 4/day Ion FISCHER Holter, DO      Or    diphenhydrAMINE  50 mg Intramuscular Q6H PRN Ion Dupontter, DO      hydrOXYzine HCL  50 mg Oral Q6H PRN Max 4/day HOLLI Lion      Or    diphenhydrAMINE  50 mg Intramuscular Q6H PRN HOLLI Lion      diphenhydrAMINE-zinc acetate   Topical BID PRN HOLLI Lion      docusate sodium  100 mg Oral BID Jourdan Dickens, DO      escitalopram  20 mg Oral Daily HOLLI Lion      famotidine  20 mg Oral BID PRN HOLLI Monge      fluticasone  1 spray Each Nare Daily PRN HOLLI Monge      glycopyrrolate  1 mg Oral BID (AM & Afternoon) Bora Rosario MD      glycopyrrolate  2 mg Oral HS Bora Rosario MD      haloperidol  1 mg Oral Q6H PRN HOLLI Lion      haloperidol  2.5 mg Oral Q4H PRN Max 4/day HOLLI Lion      haloperidol  5 mg Oral Q4H PRN Max 4/day HOLLI Lion      hydroCHLOROthiazide  12.5 mg Oral Daily HOLLI Galvan      hydrocortisone   Topical 4x Daily PRN HOLLI Lion      hydrOXYzine HCL  100 mg Oral Q6H PRN Max 4/day Ion FISCHER Holter, DO      Or    LORazepam  2 mg Intramuscular Q6H PRN Jordan C Holter, DO      hydrOXYzine HCL  100 mg Oral Q6H PRN Max 4/day HOLLI Lion      Or    LORazepam  2 mg Intramuscular Q6H PRN HOLLI Lion      hydrOXYzine HCL  25 mg Oral Q6H PRN Max 4/day Ion FISCHER Holter, DO      ibuprofen  600 mg Oral Q8H PRN HOLLI Lion      influenza vaccine  0.5 mL Intramuscular Once Bora Rosario MD      loratadine  10 mg Oral Daily Brina Guillen MD      magnesium hydroxide  30 mL Oral Once Jourdan Dickens, DO      melatonin  3 mg Oral HS PRN Bora Rosario MD      melatonin  9 mg Oral HS Bora Rosario MD      methocarbamol  500 mg Oral Q6H PRN HOLLI Lion      multivitamin-minerals  1 tablet Oral Daily HOLLI Monge      nicotine polacrilex   "2 mg Oral Q4H PRN Bora Rosario MD      OLANZapine  5 mg Oral Q4H PRN Max 3/day [facility] Holter, DO      Or    OLANZapine  2.5 mg Intramuscular Q4H PRN Max 3/day [facility] Holter, DO      OLANZapine  5 mg Oral Q3H PRN Max 3/day [facility] Holter, DO      Or    OLANZapine  5 mg Intramuscular Q3H PRN Max 3/day [facility] Holter, DO      OLANZapine  2.5 mg Oral Q4H PRN Max 6/day [facility] Holter, DO      ondansetron  4 mg Oral Q6H PRN HOLLI Lion      pantoprazole  20 mg Oral QAM [facility] Holter, DO      polyethylene glycol  17 g Oral Daily PRN Sofi Yoo, CRNP      polyethylene glycol  17 g Oral Daily Jourdan Dickens, DO      propranolol  10 mg Oral Q12H KAYLIE HOLLI Lion      senna-docusate sodium  1 tablet Oral Daily PRN [facility] Holter, DO      senna-docusate sodium  2 tablet Oral HS Jourdan Dickens, DO      traZODone  50 mg Oral HS PRN HOLLI Lion      white petrolatum-mineral oil   Topical TID PRN HOLLI Lion         Risks, benefits and possible side effects of Medications:   Risks, benefits, and possible side effects of medications explained to patient and patient verbalizes understanding.        Mental Status Evaluation:  Appearance:  Overtly appearing  overweight male, looks stated age, has dreadlock black hair, clean shaving, wearing causal clothes. Pt was in the cafeteria, talking with peers during initiation of interview.   Behavior:  normal, pleasant, cooperative   Speech:  normal rate, normal volume, normal pitch   Mood:  \"Alright.\"   Affect:  Constricted-depressed   Thought Process:  paranoid   Associations: intact associations   Thought Content:  mild paranoia   Perceptual Disturbances: Auditory hallucinations heard consistently, denies visual hallucinations. No response to internal stimuli throughout interview.    Risk Potential: Suicidal ideation -  denies suicidal ideation over the past 24 hours  Homicidal ideation -  denies homicidal ideation over the last 24 " hours  Potential for aggression - No   Sensorium:  oriented to person, place, and time/date   Memory:  recent and remote memory grossly intact   Consciousness:  alert and awake   Attention/Concentration: attention span and concentration are age appropriate   Insight:  fair   Judgment: fair   Gait/Station: normal gait/station   Motor Activity: no abnormal movements     Recommended Treatment:   See above for assessment and plan.    Inpatient Psychiatric Certification:  Patient is not at goal. They are not yet ready for discharge. The patient's condition currently requires active psychopharmacological medication management, interdisciplinary coordination with case management, and the utilization of adjunctive milieu and group therapy to augment psychopharmacological efficacy. The patient's risk of morbidity, and progression or decompensation of psychiatric disease, is higher without this current treatment. Based upon physical, mental and social evaluations, I certify that inpatient psychiatric services are medically necessary for this patient for the treatment of Schizoaffective disorder, bipolar type (HCC)    Counseling/Coordination of Care    Total unit time spent today was greater than 15 minutes. Greater than 50% of total time was spent with the patient and/or patient's relatives and/or coordination of patient's care.     Patient's rights, confidentiality, exceptions to confidentiality, use of electronic medical record including appropriate staff access to medical record regarding behavioral health services and consent to treatment were reviewed.    Note Share:     This note was not shared with the patient due to reasonable likelihood of causing patient harm     Khoa Cortez   [facility]

## 2024-11-13 NOTE — ASSESSMENT & PLAN NOTE
Reviewed on 11/13/24  Follows with medical  Continue senna-docusate sodium dose two tablets before bed per medical team.   - Patient dose was decreased to one tablet nightly on 11/3 due to reports of loose stools. Patient intends to discuss changing this back to two tablets today (11/5) with nursing staff.   No associated orders from this encounter found during lookback period of 72 hours.

## 2024-11-13 NOTE — ASSESSMENT & PLAN NOTE
Reviewed on 11/13/24  Follows with medical  Continue famotidine 20 mg tablet BID per medical team   Continue glycopyrrolate 1 mg tablet BID AM and afternoon per medical team  Continue glycopyrrolate 2 mg tablet before bed per medical team  Continue pantoprazole EC 20 mg tablet every morning per medical team  No associated orders from this encounter found during lookback period of 72 hours.

## 2024-11-14 PROCEDURE — 99232 SBSQ HOSP IP/OBS MODERATE 35: CPT | Performed by: PSYCHIATRY & NEUROLOGY

## 2024-11-14 RX ADMIN — GLYCOPYRROLATE 1 MG: 1 TABLET ORAL at 14:37

## 2024-11-14 RX ADMIN — LORATADINE 10 MG: 10 TABLET ORAL at 08:49

## 2024-11-14 RX ADMIN — PROPRANOLOL HYDROCHLORIDE 10 MG: 10 TABLET ORAL at 21:10

## 2024-11-14 RX ADMIN — GLYCOPYRROLATE 2 MG: 1 TABLET ORAL at 21:09

## 2024-11-14 RX ADMIN — NICOTINE POLACRILEX 2 MG: 2 GUM, CHEWING ORAL at 17:10

## 2024-11-14 RX ADMIN — MULTIPLE VITAMINS W/ MINERALS TAB 1 TABLET: TAB ORAL at 08:49

## 2024-11-14 RX ADMIN — PROPRANOLOL HYDROCHLORIDE 10 MG: 10 TABLET ORAL at 08:49

## 2024-11-14 RX ADMIN — NICOTINE POLACRILEX 2 MG: 2 GUM, CHEWING ORAL at 08:49

## 2024-11-14 RX ADMIN — NICOTINE POLACRILEX 2 MG: 2 GUM, CHEWING ORAL at 12:45

## 2024-11-14 RX ADMIN — GLYCOPYRROLATE 1 MG: 1 TABLET ORAL at 08:49

## 2024-11-14 RX ADMIN — PANTOPRAZOLE SODIUM 20 MG: 20 TABLET, DELAYED RELEASE ORAL at 08:49

## 2024-11-14 RX ADMIN — MELATONIN TAB 3 MG 9 MG: 3 TAB at 21:09

## 2024-11-14 RX ADMIN — ATROPINE SULFATE 1 DROP: 10 SOLUTION/ DROPS OPHTHALMIC at 21:12

## 2024-11-14 RX ADMIN — SENNOSIDES AND DOCUSATE SODIUM 2 TABLET: 50; 8.6 TABLET ORAL at 21:09

## 2024-11-14 RX ADMIN — DOCUSATE SODIUM 100 MG: 100 CAPSULE, LIQUID FILLED ORAL at 17:10

## 2024-11-14 RX ADMIN — HYDROCHLOROTHIAZIDE 12.5 MG: 12.5 TABLET ORAL at 08:49

## 2024-11-14 RX ADMIN — CLOZAPINE 225 MG: 25 TABLET ORAL at 21:11

## 2024-11-14 RX ADMIN — ESCITALOPRAM OXALATE 20 MG: 10 TABLET, FILM COATED ORAL at 08:49

## 2024-11-14 RX ADMIN — DOCUSATE SODIUM 100 MG: 100 CAPSULE, LIQUID FILLED ORAL at 08:49

## 2024-11-14 RX ADMIN — NICOTINE POLACRILEX 2 MG: 2 GUM, CHEWING ORAL at 21:11

## 2024-11-14 RX ADMIN — AMLODIPINE BESYLATE 5 MG: 5 TABLET ORAL at 08:49

## 2024-11-14 RX ADMIN — ARIPIPRAZOLE 30 MG: 15 TABLET ORAL at 08:49

## 2024-11-14 NOTE — NURSING NOTE
"Alberto was very visible this evening. He did not feel well earlier in the day (on report) but his mood was somewhat upbeat; brightened on approach. He still c/o the continued AH as he has and they have not changed stating \"I can handle them\"  He is social with peers; takes meds as prescribed; needs no prn's except Nicotine Gum  He spoke on the phone to the female peer who used to be here who is now in the Community  He is social with several peers, sitting with them and bonding  "

## 2024-11-14 NOTE — ASSESSMENT & PLAN NOTE
Reviewed on 11/14/24  Follows with medical  Continue senna-docusate sodium dose two tablets before bed per medical team.   - Patient dose was decreased to one tablet nightly on 11/3 due to reports of loose stools. Patient intends to discuss changing this back to two tablets today (11/5) with nursing staff.   No associated orders from this encounter found during lookback period of 72 hours.

## 2024-11-14 NOTE — NURSING NOTE
Visible on the unit most of the shift. Affect flat . No motivation. Admits feeling sad about a peer passing away. Support offered. Refused breakfast, 25% of lunch with an [employer]. Attended 5/6 groups today. Disheveled appearance with poor hygiene. Continues to have auditory hallucinations of them saying they want to harm him and his family. Denies suicidal ideations. Support offered. Depressed but did not rate it on a 1-10 scale. Medication compliant.

## 2024-11-14 NOTE — ASSESSMENT & PLAN NOTE
Reviewed on 11/14/24  Follows with medical  No associated orders from this encounter found during lookback period of 72 hours.

## 2024-11-14 NOTE — PLAN OF CARE
Problem: Alteration in Thoughts and Perception  Goal: Treatment Goal: Gain control of psychotic behaviors/thinking, reduce/eliminate presenting symptoms and demonstrate improved reality functioning upon discharge  Outcome: Progressing  Goal: Refrain from acting on delusional thinking/internal stimuli  Description: Interventions:  - Monitor patient closely, per order   - Utilize least restrictive measures   - Set reasonable limits, give positive feedback for acceptable   - Administer medications as ordered and monitor of potential side effects  Outcome: Progressing  Goal: Agree to be compliant with medication regime, as prescribed and report medication side effects  Description: Interventions:  - Offer appropriate PRN medication and supervise ingestion; conduct AIMS, as needed   Outcome: Progressing  Goal: Attend and participate in unit activities, including therapeutic, recreational, and educational groups  Description: Interventions:  -Encourage Visitation and family involvement in care  Outcome: Progressing  Goal: Recognize dysfunctional thoughts, communicate reality-based thoughts at the time of discharge  Description: Interventions:  - Provide medication and psycho-education to assist patient in compliance and developing insight into his/her illness   Outcome: Progressing  Goal: Complete daily ADLs, including personal hygiene independently, as able  Description: Interventions:  - Observe, teach, and assist patient with ADLS  - Monitor and promote a balance of rest/activity, with adequate nutrition and elimination   Outcome: Progressing     Problem: Ineffective Coping  Goal: Identifies ineffective coping skills  Outcome: Progressing  Goal: Identifies healthy coping skills  Outcome: Progressing  Goal: Demonstrates healthy coping skills  Outcome: Progressing  Goal: Participates in unit activities  Description: Interventions:  - Provide therapeutic environment   - Provide required programming   - Redirect  inappropriate behaviors   Outcome: Progressing     Problem: Depression  Goal: Treatment Goal: Demonstrate behavioral control of depressive symptoms, verbalize feelings of improved mood/affect, and adopt new coping skills prior to discharge  Outcome: Progressing  Goal: Refrain from isolation  Description: Interventions:  - Develop a trusting relationship   - Encourage socialization   Outcome: Progressing     Problem: Anxiety  Goal: Anxiety is at manageable level  Description: Interventions:  - Assess and monitor patient's anxiety level.   - Monitor for signs and symptoms (heart palpitations, chest pain, shortness of breath, headaches, nausea, feeling jumpy, restlessness, irritable, apprehensive).   - Collaborate with interdisciplinary team and initiate plan and interventions as ordered.  - [place] patient to unit/surroundings  - Explain treatment plan  - Encourage participation in care  - Encourage verbalization of concerns/fears  - Identify coping mechanisms  - Assist in developing anxiety-reducing skills  - Administer/offer alternative therapies  - Limit or eliminate stimulants  Outcome: Progressing     Problem: Alteration in Orientation  Goal: Interact with staff daily  Description: Interventions:  - Assess and re-assess patient's level of orientation  - Engage patient in 1 on 1 interactions, daily, for a minimum of 15 minutes   - Establish rapport/trust with patient   Outcome: Progressing  Goal: Allow medical examinations, as recommended  Description: Interventions:  - Provide physical/neurological exams and/or referrals, per provider   Outcome: Progressing  Goal: Cooperate with recommended testing/procedures  Description: Interventions:  - Determine need for ancillary testing  - Observe for mental status changes  - Implement falls/precaution protocol   Outcome: Progressing     Problem: DISCHARGE PLANNING - CARE MANAGEMENT  Goal: Discharge to post-acute care or home with appropriate resources  Description:  INTERVENTIONS:  - Conduct assessment to determine patient/family and health care team treatment goals, and need for post-acute services based on payer coverage, community resources, and patient preferences, and barriers to discharge  - Address psychosocial, clinical, and financial barriers to discharge as identified in assessment in conjunction with the patient/family and health care team  - Arrange appropriate level of post-acute services according to patient's   needs and preference and payer coverage in collaboration with the physician and health care team  - Communicate with and update the patient/family, physician, and health care team regarding progress on the discharge plan  - Arrange appropriate transportation to post-acute venues  Outcome: Progressing

## 2024-11-14 NOTE — PROGRESS NOTES
From: Deni Glaser  To: Tara Sanchez MD  Sent: 12/12/2017 11:24 AM EST  Subject: Visit Follow-Up Question    hello, what are the hours that you do blood draws please? Progress Note - Behavioral Health   Name: Alberto Berumen 29 y.o. male I MRN: [id number]  Unit/Bed#: [facility] 112-02 I Date of Admission: 3/29/2024   Date of Service: 11/14/2024 I Hospital Day: 230     Assessment & Plan  Schizoaffective disorder, bipolar type (HCC)  Reviewed on 11/14/24    Patient reports continued depression and anxiety, as well as symptoms consistent with AH. He notes that the voices threaten to harm him and his family. Patient denies any SI and HI, and denies symptoms consistent with VH. Patient notes persecutory delusions in regards to the voices he is hearing, but he denies delusions of grandiose, referential, or bizarre nature, and does not appear to be responding to internal stimuli.     Continue Clozapine 225 mg qhs for psychosis -- to consider further careful titration for Sxs depending on response  Continue to obtain CBC with diff, CRP, and CK (weekly on Tues), and a Clozapine level and BNP (every 2 weeks on Tues)    Continue Abilify 30 mg qd for psychosis  Continue Escitalopram 20 mg qd for depression and anxiety  Continue Senna-Docusate sodium one 50 mg tablet daily before bed for constipation  - decreased from two tablets daily on 11/3 due to reported loose stools  Continue propanolol 10 mg bid for anxiety   Continue amlodipine 5 mg PO daily     Pt remains on dual antipsychotic Tx due to failure on monotherapy      ACT team referral was made  No associated orders from this encounter found during lookback period of 72 hours.    Chronic idiopathic constipation  Reviewed on 11/14/24  Follows with medical  Continue senna-docusate sodium dose two tablets before bed per medical team.   - Patient dose was decreased to one tablet nightly on 11/3 due to reports of loose stools. Patient intends to discuss changing this back to two tablets today (11/5) with nursing staff.   No associated orders from this encounter found during lookback period of 72 hours.  GERD (gastroesophageal reflux  disease)  Reviewed on 11/14/24  Follows with medical  Continue famotidine 20 mg tablet BID per medical team   Continue glycopyrrolate 1 mg tablet BID AM and afternoon per medical team  Continue glycopyrrolate 2 mg tablet before bed per medical team  Continue pantoprazole EC 20 mg tablet every morning per medical team  No associated orders from this encounter found during lookback period of 72 hours.  Medical clearance for psychiatric admission  Reviewed on 11/14/24  Ongoing by medical  No associated orders from this encounter found during lookback period of 72 hours.    Tobacco abuse  Reviewed on 11/14/24  Continue to encourage cessation upon discharge  No associated orders from this encounter found during lookback period of 72 hours.  Elevated hemoglobin A1c  Reviewed on 11/14/24  Follows with medical  No associated orders from this encounter found during lookback period of 72 hours.  Class 2 obesity in adult    No associated orders from this encounter found during lookback period of 72 hours.    Primary hypertension    No associated orders from this encounter found during lookback period of 72 hours.        [facility]  Psychiatric Progress Note - Department of Behavioral Health   Alberto Berumen 29 y.o. male MRN: [id number]  Unit/Bed#: [facility] 112-02 Encounter: [id number]      SUBJECTIVE     Patient evaluated this a.m. for continuity of care. Patient was discussed at length with nursing and treatment team. Per nursing, pt is still sad about the passing of peerMontsety. He was able to use the punching bag yesterday in the workout room to relieve stress. He attended 8/10 groups yesterday, and has been pleasant and cooperative, and active on the floor. He did not require any prn medications overnight.     During evaluation, no acute distress is noted. Pt had just woken up during initiation of the interview. Interview was conducted while walking laps in the hallway. The morning the pt reported  "feeling \"okay but depressed.\" He states that he is still hearing the voices, and they are still saying things like \"he deserves to die,\" and \"your family will be killed.\" He states he is anxious about the voices, and afraid they will cause harm to his family. He is also beginning to recognize that the voices will always be there, and that he needs to develop coping skills to live with them. He reports yesterday that he did the punching bag in the workout area, which allowed him to tune out the voices and de-stress. He also reports that listening to music is another way to block the voices. He is anxious about \"life in general,\" and states things like where he will live after discharge, and his finances are the main causes of his anxiety. He states that he was able to talk to his sister on the phone yesterday, and they had a good conversation together. He denies any visual hallucinations. He denies having any homicidal or suicidal ideation over the past 24 hours. He states that his anxiety and depression are around a 7/10, consistent with yesterday. He went to 8/10 groups yesterday. He was also able to watch his favorite basketball team, the [id number] last night, and enjoyed watching the game. He appropriately interacts with other patients on the floor. He is eating well, but he always skips breakfast in an attempt to lose weight. He is sleeping well, but endorses feeling very tired this morning despite the good, full sleep. He denies any new symptoms or side effects over the past 24 hours.       PSYCHIATRIC REVIEW OF SYSTEMS     Behavior over the last 24 hours:  unchanged  Sleep: normal  Appetite: normal  Medication side effects: No    Progress Toward Goals: Yes, as evidenced by their participation (or lack thereof) in individual, social and therapeutic milieu in addition to adherence to their medication regimen.  Patient Acceptance: Pt accepting that treatment is needed.  Patient Understanding: Pt is understanding of " his illness, and understanding that he needs to develop better coping skills.   Patient Involvement in Reintegration Process: Yes, pt has been talking to his sister and aunt almost daily.   Patient's Therapeutic Relationship with Psychiatrist: Pt is pleasant and cooperative during interactions with the psychiatric team.   Patient's Optimism Regarding Recovery: Pt is not optimistic in regards to his recovery.   Group Attendance: Pt attended 8/10 groups. He is also present on the floor, and interacts appropriately with peers.     REVIEW OF SYSTEMS   Review of systems:  Full ROS was completed, with no positive findings.     OBJECTIVE     Vital Signs in Past 24 Hours:  Temp:  [97.6 °F (36.4 °C)-98 °F (36.7 °C)] 97.6 °F (36.4 °C)  HR:  [] 91  BP: (119-157)/(69-76) 157/69  Resp:  [17-18] 17  SpO2:  [96 %-99 %] 96 %  O2 Device: None (Room air)    Intake/Output in Past 24 hours:  I/O last 3 completed shifts:  In: 330 [P.O.:330]  Out: -   No intake/output data recorded.        Laboratory Results:  I have personally reviewed all pertinent laboratory/tests results.    Behavioral Health Medications: all current active meds have been reviewed.    Current Facility-Administered Medications   Medication Dose Route Frequency Provider Last Rate    acetaminophen  650 mg Oral Q4H PRN Jordan C Holter,       acetaminophen  650 mg Oral Q6H PRN HOLLI Lion      aluminum-magnesium hydroxide-simethicone  30 mL Oral Q4H PRN Jordan C Holter, DO      amLODIPine  5 mg Oral Daily HOLLI Lion      ARIPiprazole  30 mg Oral Daily Bora Rosario MD      Artificial Tears  1 drop Both Eyes Q3H PRN Jordan C Holter, DO      atropine  1 drop Sublingual HS Harjeet Torres, DO      haloperidol lactate  2.5 mg Intramuscular Q4H PRN Max 4/day HOLLI Lion      And    LORazepam  1 mg Intramuscular Q4H PRN Max 4/day HOLLI Lion      And    benztropine  0.5 mg Intramuscular Q4H PRN Max 4/day HOLLI Lion      benztropine   1 mg Intramuscular Q4H PRN Max 6/day Ion  Holter, DO      haloperidol lactate  5 mg Intramuscular Q4H PRN Max 4/day HOLLI Lion      And    LORazepam  2 mg Intramuscular Q4H PRN Max 4/day HOLLI Lion      And    benztropine  1 mg Intramuscular Q4H PRN Max 4/day HOLLI Lion      benztropine  1 mg Oral Q4H PRN Max 6/day HOLLI Lion      benztropine  1 mg Oral Q4H PRN Max 6/day [facility] Holter, DO      bisacodyl  10 mg Rectal Daily PRN HOLLI Lion      calcium carbonate  500 mg Oral BID PRN HOLLI Monge      cloZAPine  225 mg Oral HS Hardik So MD      hydrOXYzine HCL  50 mg Oral Q6H PRN Max 4/day [facility] Cresencioter, DO      Or    diphenhydrAMINE  50 mg Intramuscular Q6H PRN [facility] Holter, DO      hydrOXYzine HCL  50 mg Oral Q6H PRN Max 4/day HOLLI Lion      Or    diphenhydrAMINE  50 mg Intramuscular Q6H PRN HOLLI Lion      diphenhydrAMINE-zinc acetate   Topical BID PRN HOLLI Lion      docusate sodium  100 mg Oral BID Jourdan Dickens DO      escitalopram  20 mg Oral Daily HOLLI Lion      famotidine  20 mg Oral BID PRN HOLLI Monge      fluticasone  1 spray Each Nare Daily PRN HOLLI Monge      glycopyrrolate  1 mg Oral BID (AM & Afternoon) Bora Rosario MD      glycopyrrolate  2 mg Oral HS Bora Rosario MD      haloperidol  1 mg Oral Q6H PRN HOLLI Lion      haloperidol  2.5 mg Oral Q4H PRN Max 4/day HOLLI Lion      haloperidol  5 mg Oral Q4H PRN Max 4/day HOLLI Lion      hydroCHLOROthiazide  12.5 mg Oral Daily HOLLI Galvan      hydrocortisone   Topical 4x Daily PRN HOLLI Lion      hydrOXYzine HCL  100 mg Oral Q6H PRN Max 4/day [facility] Cresencioter, DO      Or    LORazepam  2 mg Intramuscular Q6H PRN Jordan C Holter, DO      hydrOXYzine HCL  100 mg Oral Q6H PRN Max 4/day HOLLI Lion      Or    LORazepam  2 mg Intramuscular Q6H PRN Eveline  HOLLI Hunt      hydrOXYzine HCL  25 mg Oral Q6H PRN Max 4/day [facility] Cresencioter, DO      ibuprofen  600 mg Oral Q8H PRN HOLLI Lion      influenza vaccine  0.5 mL Intramuscular Once Bora Rosario MD      loratadine  10 mg Oral Daily Brina Guillen MD      magnesium hydroxide  30 mL Oral Once Jourdan Dickens, DO      melatonin  3 mg Oral HS PRN Bora Rosario MD      melatonin  9 mg Oral HS Bora Rosario MD      methocarbamol  500 mg Oral Q6H PRN HOLLI Lion      multivitamin-minerals  1 tablet Oral Daily Eileen Jensen, HOLLI      nicotine polacrilex  2 mg Oral Q4H PRN Bora Rosario MD      OLANZapine  5 mg Oral Q4H PRN Max 3/day [facility] Holter, DO      Or    OLANZapine  2.5 mg Intramuscular Q4H PRN Max 3/day [facility] Holter, DO      OLANZapine  5 mg Oral Q3H PRN Max 3/day [facility] Holter, DO      Or    OLANZapine  5 mg Intramuscular Q3H PRN Max 3/day [facility] Holter, DO      OLANZapine  2.5 mg Oral Q4H PRN Max 6/day [facility] Holter, DO      ondansetron  4 mg Oral Q6H PRN HOLLI Lion      pantoprazole  20 mg Oral [facility] Cresencioter, DO      polyethylene glycol  17 g Oral Daily PRN HOLLI Ghotra      polyethylene glycol  17 g Oral Daily Jourdan Dickens, DO      propranolol  10 mg Oral Q12H KAYLIE HOLLI Lion      senna-docusate sodium  1 tablet Oral Daily PRN [facility] Holter, DO      senna-docusate sodium  2 tablet Oral HS Jourdan Dickens, DO      traZODone  50 mg Oral HS PRN HOLLI Lion      white petrolatum-mineral oil   Topical TID PRN HOLLI Lion         Risks, benefits and possible side effects of Medications:   Risks, benefits, and possible side effects of medications explained to patient and patient verbalizes understanding.      Mental Status Evaluation:  Appearance:  Overtly appearing, , overweight male, looks stated age, dreadlock hair down to shoulders, clean shaving,  wearing sweat pants and a sweatshirt, and croc shoes. Pt was in bed  "during initiation of the interview.    Behavior:  pleasant, cooperative   Speech:  normal rate, normal volume, normal pitch   Mood:  \"Okay but depressed.\"   Affect:  Constricted-depressed   Thought Process:  Paranoid, logical, goal-directed   Associations: intact associations   Thought Content:  mild paranoia, concerned of threatening voices   Perceptual Disturbances: Pt reports auditory hallucinations, denies visual hallucinations, not responding to internal stimuli throughout interview.    Risk Potential: Suicidal ideation -  pt denies suicidal ideation over the past 24 hours  Homicidal ideation -  pt denies homicidal ideation over the past 24 hours  Potential for aggression - No   Sensorium:  oriented to person, place, and time/date   Memory:  recent and remote memory grossly intact   Consciousness:  alert and awake   Attention/Concentration: attention span and concentration are age appropriate   Insight:  fair   Judgment: fair   Gait/Station: normal gait/station   Motor Activity: no abnormal movements     Recommended Treatment:   See above for assessment and plan.    Inpatient Psychiatric Certification:  Patient is not at goal. They are not yet ready for discharge. The patient's condition currently requires active psychopharmacological medication management, interdisciplinary coordination with case management, and the utilization of adjunctive milieu and group therapy to augment psychopharmacological efficacy. The patient's risk of morbidity, and progression or decompensation of psychiatric disease, is higher without this current treatment. Based upon physical, mental and social evaluations, I certify that inpatient psychiatric services are medically necessary for this patient for the treatment of Schizoaffective disorder, bipolar type (HCC)    Counseling/Coordination of Care    Total unit time spent today was greater than 15 minutes. Greater than 50% of total time was spent with the patient and/or patient's " relatives and/or coordination of patient's care.     Patient's rights, confidentiality, exceptions to confidentiality, use of electronic medical record including appropriate staff access to medical record regarding behavioral health services and consent to treatment were reviewed.    Note Share:     This note was not shared with the patient due to reasonable likelihood of causing patient harm     Khoa Cortez   [facility]

## 2024-11-14 NOTE — ASSESSMENT & PLAN NOTE
Reviewed on 11/14/24  Ongoing by medical  No associated orders from this encounter found during lookback period of 72 hours.

## 2024-11-14 NOTE — PROGRESS NOTES
11/14/24 0757   Team Meeting   Meeting Type Daily Rounds   Team Members Present   Team Members Present Physician;Nurse;;Other (Discipline and Name)   Physician Team Member Abraham   Nursing Team Member Claudia   Social Work Team Member Jose J ORTEGA   Other (Discipline and Name) Wang PCM   Patient/Family Present   Patient Present No   Patient's Family Present No     Groups Participation  9/11.   He's engaged in his treatment. He socializes with his peers. He's appropriate and compliant with his medications.

## 2024-11-14 NOTE — ASSESSMENT & PLAN NOTE
Reviewed on 11/14/24    Patient reports continued depression and anxiety, as well as symptoms consistent with AH. He notes that the voices threaten to harm him and his family. Patient denies any SI and HI, and denies symptoms consistent with VH. Patient notes persecutory delusions in regards to the voices he is hearing, but he denies delusions of grandiose, referential, or bizarre nature, and does not appear to be responding to internal stimuli.     Continue Clozapine 225 mg qhs for psychosis -- to consider further careful titration for Sxs depending on response  Continue to obtain CBC with diff, CRP, and CK (weekly on Tues), and a Clozapine level and BNP (every 2 weeks on Tues)    Continue Abilify 30 mg qd for psychosis  Continue Escitalopram 20 mg qd for depression and anxiety  Continue Senna-Docusate sodium one 50 mg tablet daily before bed for constipation  - decreased from two tablets daily on 11/3 due to reported loose stools  Continue propanolol 10 mg bid for anxiety   Continue amlodipine 5 mg PO daily     Pt remains on dual antipsychotic Tx due to failure on monotherapy      ACT team referral was made  No associated orders from this encounter found during lookback period of 72 hours.

## 2024-11-14 NOTE — NURSING NOTE
Pt is visible in the milieu and social with select peers. He consumed 100 % of dinner. Took his medications without incidence. Pt reports anxiety and depression and AH of voices telling him that they will harm him and his family. Pt is polite, pleasant, and cooperative. Brightens on approach. Attended Community and Wrap up groups. No unmet needs. No behavioral issues.

## 2024-11-14 NOTE — ASSESSMENT & PLAN NOTE
Reviewed on 11/14/24  Continue to encourage cessation upon discharge  No associated orders from this encounter found during lookback period of 72 hours.

## 2024-11-14 NOTE — ASSESSMENT & PLAN NOTE
Reviewed on 11/14/24  Follows with medical  Continue famotidine 20 mg tablet BID per medical team   Continue glycopyrrolate 1 mg tablet BID AM and afternoon per medical team  Continue glycopyrrolate 2 mg tablet before bed per medical team  Continue pantoprazole EC 20 mg tablet every morning per medical team  No associated orders from this encounter found during lookback period of 72 hours.

## 2024-11-15 PROCEDURE — 99232 SBSQ HOSP IP/OBS MODERATE 35: CPT | Performed by: PSYCHIATRY & NEUROLOGY

## 2024-11-15 RX ADMIN — CLOZAPINE 225 MG: 25 TABLET ORAL at 21:25

## 2024-11-15 RX ADMIN — SENNOSIDES AND DOCUSATE SODIUM 2 TABLET: 50; 8.6 TABLET ORAL at 21:26

## 2024-11-15 RX ADMIN — PROPRANOLOL HYDROCHLORIDE 10 MG: 10 TABLET ORAL at 21:24

## 2024-11-15 RX ADMIN — GLYCOPYRROLATE 2 MG: 1 TABLET ORAL at 21:26

## 2024-11-15 RX ADMIN — NICOTINE POLACRILEX 2 MG: 2 GUM, CHEWING ORAL at 12:35

## 2024-11-15 RX ADMIN — DOCUSATE SODIUM 100 MG: 100 CAPSULE, LIQUID FILLED ORAL at 08:32

## 2024-11-15 RX ADMIN — ATROPINE SULFATE 1 DROP: 10 SOLUTION/ DROPS OPHTHALMIC at 21:22

## 2024-11-15 RX ADMIN — GLYCOPYRROLATE 1 MG: 1 TABLET ORAL at 08:33

## 2024-11-15 RX ADMIN — LORATADINE 10 MG: 10 TABLET ORAL at 08:33

## 2024-11-15 RX ADMIN — NICOTINE POLACRILEX 2 MG: 2 GUM, CHEWING ORAL at 17:09

## 2024-11-15 RX ADMIN — MULTIPLE VITAMINS W/ MINERALS TAB 1 TABLET: TAB ORAL at 08:34

## 2024-11-15 RX ADMIN — AMLODIPINE BESYLATE 5 MG: 5 TABLET ORAL at 08:32

## 2024-11-15 RX ADMIN — GLYCOPYRROLATE 1 MG: 1 TABLET ORAL at 13:46

## 2024-11-15 RX ADMIN — PROPRANOLOL HYDROCHLORIDE 10 MG: 10 TABLET ORAL at 08:33

## 2024-11-15 RX ADMIN — ESCITALOPRAM OXALATE 20 MG: 10 TABLET, FILM COATED ORAL at 08:34

## 2024-11-15 RX ADMIN — NICOTINE POLACRILEX 2 MG: 2 GUM, CHEWING ORAL at 21:26

## 2024-11-15 RX ADMIN — HYDROCHLOROTHIAZIDE 12.5 MG: 12.5 TABLET ORAL at 08:32

## 2024-11-15 RX ADMIN — PANTOPRAZOLE SODIUM 20 MG: 20 TABLET, DELAYED RELEASE ORAL at 08:34

## 2024-11-15 RX ADMIN — MELATONIN TAB 3 MG 9 MG: 3 TAB at 21:24

## 2024-11-15 RX ADMIN — DOCUSATE SODIUM 100 MG: 100 CAPSULE, LIQUID FILLED ORAL at 17:08

## 2024-11-15 RX ADMIN — ARIPIPRAZOLE 30 MG: 15 TABLET ORAL at 08:34

## 2024-11-15 RX ADMIN — NICOTINE POLACRILEX 2 MG: 2 GUM, CHEWING ORAL at 08:33

## 2024-11-15 NOTE — NURSING NOTE
Patient has been visible all shift sitting with peers socializing. Disheveled appearance. Utilizing punching bag when anxious. No PRN medication requested but was also offered, refused. Pleasant. Admits having auditory hallucinations telling him they will hurt him and his family. Support offered. Denies suicidal ideations.

## 2024-11-15 NOTE — ASSESSMENT & PLAN NOTE
Reviewed on 11/15/24  Follows with medical  Continue senna-docusate sodium dose two tablets before bed per medical team.   - Patient dose was decreased to one tablet nightly on 11/3 due to reports of loose stools. Patient intends to discuss changing this back to two tablets today (11/5) with nursing staff.   No associated orders from this encounter found during lookback period of 72 hours.

## 2024-11-15 NOTE — NURSING NOTE
Patient has been visible most of the shift.  Social with select peers. Smiles when approached.  Medication compliant. Unkept appearance.  Preoccupied with  peer. Denies suicidal ideations. Continues to admit having auditory halllucinations telling him they are going to hurt him and his family. Support offered. Offers no other complaints. Attended 4/5 groups today.

## 2024-11-15 NOTE — PLAN OF CARE
Problem: Alteration in Thoughts and Perception  Goal: Treatment Goal: Gain control of psychotic behaviors/thinking, reduce/eliminate presenting symptoms and demonstrate improved reality functioning upon discharge  Outcome: Progressing  Goal: Refrain from acting on delusional thinking/internal stimuli  Description: Interventions:  - Monitor patient closely, per order   - Utilize least restrictive measures   - Set reasonable limits, give positive feedback for acceptable   - Administer medications as ordered and monitor of potential side effects  Outcome: Progressing  Goal: Agree to be compliant with medication regime, as prescribed and report medication side effects  Description: Interventions:  - Offer appropriate PRN medication and supervise ingestion; conduct AIMS, as needed   Outcome: Progressing  Goal: Attend and participate in unit activities, including therapeutic, recreational, and educational groups  Description: Interventions:  -Encourage Visitation and family involvement in care  Outcome: Progressing     Problem: Ineffective Coping  Goal: Demonstrates healthy coping skills  Outcome: Progressing  Goal: Participates in unit activities  Description: Interventions:  - Provide therapeutic environment   - Provide required programming   - Redirect inappropriate behaviors   Outcome: Progressing     Problem: Depression  Goal: Refrain from harming self  Description: Interventions:  - Monitor patient closely, per order   - Supervise medication ingestion, monitor effects and side effects   Outcome: Progressing  Goal: Refrain from isolation  Description: Interventions:  - Develop a trusting relationship   - Encourage socialization   Outcome: Progressing     Problem: Anxiety  Goal: Anxiety is at manageable level  Description: Interventions:  - Assess and monitor patient's anxiety level.   - Monitor for signs and symptoms (heart palpitations, chest pain, shortness of breath, headaches, nausea, feeling jumpy, restlessness,  irritable, apprehensive).   - Collaborate with interdisciplinary team and initiate plan and interventions as ordered.  - [place] patient to unit/surroundings  - Explain treatment plan  - Encourage participation in care  - Encourage verbalization of concerns/fears  - Identify coping mechanisms  - Assist in developing anxiety-reducing skills  - Administer/offer alternative therapies  - Limit or eliminate stimulants  Outcome: Progressing

## 2024-11-15 NOTE — PROGRESS NOTES
11/15/24 0809   Team Meeting   Meeting Type Daily Rounds   Team Members Present   Team Members Present Physician;Nurse;;Other (Discipline and Name)   Physician Team Member  Abraham   Nursing Team Member Claudia   Social Work Team Member Jose J ORTEGA   Other (Discipline and Name) Jeremias Grace MS    Patient/Family Present   Patient Present No   Patient's Family Present No     Groups Participation  9/11.   Patient's compliant with medications. He's engaged in his treatment. He's appropriate and interacts with his peers. Pending discharge home with [facility] services.

## 2024-11-15 NOTE — ASSESSMENT & PLAN NOTE
Reviewed on 11/15/24  Follows with medical  Continue famotidine 20 mg tablet BID per medical team   Continue glycopyrrolate 1 mg tablet BID AM and afternoon per medical team  Continue glycopyrrolate 2 mg tablet before bed per medical team  Continue pantoprazole EC 20 mg tablet every morning per medical team  No associated orders from this encounter found during lookback period of 72 hours.

## 2024-11-15 NOTE — ASSESSMENT & PLAN NOTE
Reviewed on 11/15/24    Patient reports continued depression and anxiety, as well as symptoms consistent with AH. He notes that the voices threaten to harm him and his family. Patient denies any SI and HI, and denies symptoms consistent with VH. Patient notes persecutory delusions in regards to the voices he is hearing, but he denies delusions of grandiose, referential, or bizarre nature, and does not appear to be responding to internal stimuli.     Continue Clozapine 225 mg qhs for psychosis -- to consider further careful titration for Sxs depending on response  Continue to obtain CBC with diff, CRP, and CK (weekly on Tues), and a Clozapine level and BNP (every 2 weeks on Tues)    Continue Abilify 30 mg qd for psychosis  Continue Escitalopram 20 mg qd for depression and anxiety  Continue Senna-Docusate sodium one 50 mg tablet daily before bed for constipation  - decreased from two tablets daily on 11/3 due to reported loose stools  Continue propanolol 10 mg bid for anxiety   Continue amlodipine 5 mg PO daily     Pt remains on dual antipsychotic Tx due to failure on monotherapy      ACT team referral was made  No associated orders from this encounter found during lookback period of 72 hours.

## 2024-11-15 NOTE — PLAN OF CARE
Problem: Ineffective Coping  Goal: Identifies ineffective coping skills  Outcome: Progressing  Goal: Identifies healthy coping skills  Outcome: Progressing  Goal: Demonstrates healthy coping skills  Outcome: Progressing  Goal: Participates in unit activities  Description: Interventions:  - Provide therapeutic environment   - Provide required programming   - Redirect inappropriate behaviors   Outcome: Progressing   Continues to make progressed towards the goals by attending groups and actively engaging.

## 2024-11-15 NOTE — PROGRESS NOTES
Progress Note - Behavioral Health   Name: Alberto Berumen 29 y.o. male I MRN: [id number]  Unit/Bed#: [facility] 112-02 I Date of Admission: 3/29/2024   Date of Service: 11/15/2024 I Hospital Day: 231     Assessment & Plan  Schizoaffective disorder, bipolar type (HCC)  Reviewed on 11/15/24    Patient reports continued depression and anxiety, as well as symptoms consistent with AH. He notes that the voices threaten to harm him and his family. Patient denies any SI and HI, and denies symptoms consistent with VH. Patient notes persecutory delusions in regards to the voices he is hearing, but he denies delusions of grandiose, referential, or bizarre nature, and does not appear to be responding to internal stimuli.     Continue Clozapine 225 mg qhs for psychosis -- to consider further careful titration for Sxs depending on response  Continue to obtain CBC with diff, CRP, and CK (weekly on Tues), and a Clozapine level and BNP (every 2 weeks on Tues)    Continue Abilify 30 mg qd for psychosis  Continue Escitalopram 20 mg qd for depression and anxiety  Continue Senna-Docusate sodium one 50 mg tablet daily before bed for constipation  - decreased from two tablets daily on 11/3 due to reported loose stools  Continue propanolol 10 mg bid for anxiety   Continue amlodipine 5 mg PO daily     Pt remains on dual antipsychotic Tx due to failure on monotherapy      ACT team referral was made  No associated orders from this encounter found during lookback period of 72 hours.    Chronic idiopathic constipation  Reviewed on 11/15/24  Follows with medical  Continue senna-docusate sodium dose two tablets before bed per medical team.   - Patient dose was decreased to one tablet nightly on 11/3 due to reports of loose stools. Patient intends to discuss changing this back to two tablets today (11/5) with nursing staff.   No associated orders from this encounter found during lookback period of 72 hours.  GERD (gastroesophageal reflux  disease)  Reviewed on 11/15/24  Follows with medical  Continue famotidine 20 mg tablet BID per medical team   Continue glycopyrrolate 1 mg tablet BID AM and afternoon per medical team  Continue glycopyrrolate 2 mg tablet before bed per medical team  Continue pantoprazole EC 20 mg tablet every morning per medical team  No associated orders from this encounter found during lookback period of 72 hours.  Medical clearance for psychiatric admission  Reviewed on 11/15/24  Ongoing by medical  No associated orders from this encounter found during lookback period of 72 hours.    Tobacco abuse  Reviewed on 11/15/24  Continue to encourage cessation upon discharge  No associated orders from this encounter found during lookback period of 72 hours.  Elevated hemoglobin A1c  Reviewed on 11/15/24  Follows with medical  No associated orders from this encounter found during lookback period of 72 hours.  Class 2 obesity in adult    No associated orders from this encounter found during lookback period of 72 hours.    Primary hypertension    No associated orders from this encounter found during lookback period of 72 hours.        [facility]  Psychiatric Progress Note - Department of Behavioral Health   Alberto Berumen 29 y.o. male MRN: [id number]  Unit/Bed#: [facility] 112-02 Encounter: [id number]      SUBJECTIVE     Patient evaluated this a.m. for continuity of care. Patient was discussed at length with nursing and treatment team. Per nursing, over the past 24 hours pt has been depressed about multiple different things, one of which being that passing of a peer, Mari. He brightens approprietly when talked to, and has been pleasant and cooperative with the staff. He is also very sociable with the other patients. He attended 9/11 groups yesterday. No prn medications needed over the past 24 hours.     During evaluation, no acute distress is noted. On initiation of interview, pt was doing laps around the unit with  "peers, and interview was conducted in the hallway. He reports feeling \"fine, but depressed,\" this morning. He says that multiple different things have him feeling this way including the passing away of peer, Mari. He also endorses that \"life in general,\" has him depressed and he is particularly concerned with his living situation and finances after discharge from the unit. He is still hearing the voices, telling him things like \"you deserve to die,\" and \"your family will die.\" Pt hears the voices throughout the whole day, but states that things like talking with peers, working out, and listening to music stop the voices. He recognizes that they will always be present, and is working on coping skills to handle them. He did use the punching bag yesterday, which he typically enjoys doing, because he was too tired. He also reports feeling anxious, with ruminating thoughts about how he will handle everything in life after discharge. He denies any suicidal or homicidal ideations. He endorses mild concern that voices will hurt him or his family, but no other fixed, false beliefs. He denies any visual hallucinations. He denies any aggression or agitation over the past 24 hours. He attended 9/11 groups yesterday, and enjoys attending. He has been having a good appetite, but endorses skipping breakfast everyday in an attempt to lose weight. He reports getting a good sleep last night, but still  feels very tired today. He denies any new or worsening symptoms or side effects over the past 24 hours.     PSYCHIATRIC REVIEW OF SYSTEMS     Behavior over the last 24 hours:  unchanged  Sleep: normal  Appetite: normal, not eating breakfast to lose weight.   Medication side effects: No    Progress Toward Goals: Yes, as evidenced by their participation (or lack thereof) in individual, social and therapeutic milieu in addition to adherence to their medication regimen.  Patient Acceptance: Patient accepting that treatment is " needed  Patient Understanding: Patient understanding that treatment is needed  Patient Involvement in Reintegration Process: Patient is involved in the reintegration process, with daily phone calls to either his sister or aunt, who he will be living with after discharge.   Patient's Therapeutic Relationship with Psychiatrist: Patient very pleasant and cooperative with the psychiatry team.   Patient's Optimism Regarding Recovery: Patient is not optimistic regarding his recovery. He understands that he needs to develop and use better coping skills to tune out the voices. He is also anxious about discharge and life after discharge.   Group Attendance: 9/11 groups attended yesterday. Patient also present on the unit. Pleasant and cooperative, and very engaged with his peers.     REVIEW OF SYSTEMS   Review of systems: Full ROS was performed with no positive findings.     OBJECTIVE     Vital Signs in Past 24 Hours:  Temp:  [97.8 °F (36.6 °C)] 97.8 °F (36.6 °C)  HR:  [] 82  BP: (116-128)/(70-81) 128/70  Resp:  [18] 18  SpO2:  [98 %] 98 %  O2 Device: None (Room air)    Intake/Output in Past 24 hours:  I/O last 3 completed shifts:  In: 330 [P.O.:330]  Out: -   No intake/output data recorded.        Laboratory Results:  I have personally reviewed all pertinent laboratory/tests results.    Behavioral Health Medications: all current active meds have been reviewed.    Current Facility-Administered Medications   Medication Dose Route Frequency Provider Last Rate    acetaminophen  650 mg Oral Q4H PRN Jordan C Holter, DO      acetaminophen  650 mg Oral Q6H PRN HOLLI Lion      aluminum-magnesium hydroxide-simethicone  30 mL Oral Q4H PRN Jordan C Holter, DO      amLODIPine  5 mg Oral Daily HOLLI Lion      ARIPiprazole  30 mg Oral Daily Bora Rosario MD      Artificial Tears  1 drop Both Eyes Q3H PRN Jordan C Holter, DO      atropine  1 drop Sublingual HS Harjeet Torres DO      haloperidol lactate  2.5 mg  Intramuscular Q4H PRN Max 4/day HOLLI Lion      And    LORazepam  1 mg Intramuscular Q4H PRN Max 4/day OHLLI Lion      And    benztropine  0.5 mg Intramuscular Q4H PRN Max 4/day HOLLI Lion      benztropine  1 mg Intramuscular Q4H PRN Max 6/day Ion Dupontter, DO      haloperidol lactate  5 mg Intramuscular Q4H PRN Max 4/day HOLLI Lion      And    LORazepam  2 mg Intramuscular Q4H PRN Max 4/day STEVE LionNP      And    benztropine  1 mg Intramuscular Q4H PRN Max 4/day HOLLI Lion      benztropine  1 mg Oral Q4H PRN Max 6/day HOLLI Lion      benztropine  1 mg Oral Q4H PRN Max 6/day Jordan C Holter, DO      bisacodyl  10 mg Rectal Daily PRN HOLLI Lion      calcium carbonate  500 mg Oral BID PRN HOLLI Monge      cloZAPine  225 mg Oral HS Hardik So MD      hydrOXYzine HCL  50 mg Oral Q6H PRN Max 4/day Jordan C Holter, DO      Or    diphenhydrAMINE  50 mg Intramuscular Q6H PRN Jordan C Holter, DO      hydrOXYzine HCL  50 mg Oral Q6H PRN Max 4/day HOLLI Lion      Or    diphenhydrAMINE  50 mg Intramuscular Q6H PRN HOLLI Lion      diphenhydrAMINE-zinc acetate   Topical BID PRN HOLLI Lion      docusate sodium  100 mg Oral BID Jourdan Dickens, DO      escitalopram  20 mg Oral Daily HOLLI Lion      famotidine  20 mg Oral BID PRN HOLLI Monge      fluticasone  1 spray Each Nare Daily PRN HOLLI Monge      glycopyrrolate  1 mg Oral BID (AM & Afternoon) Bora Rosario MD      glycopyrrolate  2 mg Oral HS Bora Rosario MD      haloperidol  1 mg Oral Q6H PRN HOLLI Lion      haloperidol  2.5 mg Oral Q4H PRN Max 4/day HOLLI Lion      haloperidol  5 mg Oral Q4H PRN Max 4/day HOLLI Lion      hydroCHLOROthiazide  12.5 mg Oral Daily HOLLI Galvan      hydrocortisone   Topical 4x Daily PRN HOLLI Lion      hydrOXYzine HCL  100 mg Oral Q6H  PRN Max 4/day [facility] Holter, DO      Or    LORazepam  2 mg Intramuscular Q6H PRN [facility] Cresencioter, DO      hydrOXYzine HCL  100 mg Oral Q6H PRN Max 4/day HOLLI Lion      Or    LORazepam  2 mg Intramuscular Q6H PRN HOLLI Lion      hydrOXYzine HCL  25 mg Oral Q6H PRN Max 4/day [facility] Holter, DO      ibuprofen  600 mg Oral Q8H PRN HOLLI Lion      influenza vaccine  0.5 mL Intramuscular Once Bora Rosario MD      loratadine  10 mg Oral Daily Brina Guillen MD      magnesium hydroxide  30 mL Oral Once Jourdan Dickens, DO      melatonin  3 mg Oral HS PRN Bora Rosario MD      melatonin  9 mg Oral HS Bora Rosario MD      methocarbamol  500 mg Oral Q6H PRN HOLLI Lion      multivitamin-minerals  1 tablet Oral Daily Eileen GonzalezHOLLI evans      nicotine polacrilex  2 mg Oral Q4H PRN Bora Rosario MD      OLANZapine  5 mg Oral Q4H PRN Max 3/day [facility] Holter, DO      Or    OLANZapine  2.5 mg Intramuscular Q4H PRN Max 3/day [facility] Holter, DO      OLANZapine  5 mg Oral Q3H PRN Max 3/day [facility] Holter, DO      Or    OLANZapine  5 mg Intramuscular Q3H PRN Max 3/day [facility] Holter, DO      OLANZapine  2.5 mg Oral Q4H PRN Max 6/day [facility] Holter, DO      ondansetron  4 mg Oral Q6H PRN HOLLI Lion      pantoprazole  20 mg Oral [facility] Holter, DO      polyethylene glycol  17 g Oral Daily PRN HOLLI Ghotra      polyethylene glycol  17 g Oral Daily Jourdan Dickens, DO      propranolol  10 mg Oral Q12H KAYLIE HOLLI Lion      senna-docusate sodium  1 tablet Oral Daily PRN Ion  Holter, DO      senna-docusate sodium  2 tablet Oral HS Jourdan Dickens, DO      traZODone  50 mg Oral HS PRN HOLLI Lion      white petrolatum-mineral oil   Topical TID PRN HOLLI Lion         Risks, benefits and possible side effects of Medications:   Risks, benefits, and possible side effects of medications explained to patient and patient verbalizes understanding.   "    Mental Status Evaluation:  Appearance:  Overtly appearing  male, looks stated age, with shoulder length, black, dreadlock hair, clean shaving, wearing a sweatshirt and sweat pants, with long t shirt, wearing croc shoes, pt was walking laps on initiation of interview.    Behavior:  pleasant, cooperative   Speech:  normal rate, normal pitch, low volume   Mood:  \"Fine, but depressed.\"   Affect:  Constricted-depressed   Thought Process:  Paranoid, logical, goal-oriented   Associations: intact associations   Thought Content:  Mild paranoia regarding threatening auditory hallucinations, denies visual hallucinations, not responding to internal stimuli throughout interview.    Perceptual Disturbances: Auditory hallucinations, denies visual hallucinations, not responding to internal stimuli during interview.    Risk Potential: Suicidal ideation -  Denies suicidal ideation over the past 24 hours  Homicidal ideation -  denies homicidal ideation over the past 24 hours  Potential for aggression - No   Sensorium:  oriented to person, place, and time/date   Memory:  recent and remote memory grossly intact   Consciousness:  alert and awake   Attention/Concentration: attention span and concentration are age appropriate   Insight:  fair   Judgment: fair   Gait/Station: normal gait/station   Motor Activity: no abnormal movements     Recommended Treatment:   See above for assessment and plan.    Inpatient Psychiatric Certification:  Patient is not at goal. They are not yet ready for discharge. The patient's condition currently requires active psychopharmacological medication management, interdisciplinary coordination with case management, and the utilization of adjunctive milieu and group therapy to augment psychopharmacological efficacy. The patient's risk of morbidity, and progression or decompensation of psychiatric disease, is higher without this current treatment. Based upon physical, mental and social " evaluations, I certify that inpatient psychiatric services are medically necessary for this patient for the treatment of Schizoaffective disorder, bipolar type (HCC)    Counseling/Coordination of Care    Total unit time spent today was greater than 15 minutes. Greater than 50% of total time was spent with the patient and/or patient's relatives and/or coordination of patient's care.     Patient's rights, confidentiality, exceptions to confidentiality, use of electronic medical record including appropriate staff access to medical record regarding behavioral health services and consent to treatment were reviewed.    Note Share:     This note was not shared with the patient due to reasonable likelihood of causing patient harm     Khoa Cortez   [facility]

## 2024-11-15 NOTE — ASSESSMENT & PLAN NOTE
Reviewed on 11/15/24  Continue to encourage cessation upon discharge  No associated orders from this encounter found during lookback period of 72 hours.

## 2024-11-15 NOTE — ASSESSMENT & PLAN NOTE
Reviewed on 11/15/24  Follows with medical  No associated orders from this encounter found during lookback period of 72 hours.

## 2024-11-15 NOTE — ASSESSMENT & PLAN NOTE
Reviewed on 11/15/24  Ongoing by medical  No associated orders from this encounter found during lookback period of 72 hours.

## 2024-11-15 NOTE — NURSING NOTE
Pt in bed asleep, observed eyes closed, and chest movement noted. 15 minute safety checks maintained. No unmet needs at this time. Will continue to monitor patient needs, sleep pattern, and behaviors.     0650: Patient has slept all night, 7+ hours of uninterrupted sleep

## 2024-11-16 PROCEDURE — 99232 SBSQ HOSP IP/OBS MODERATE 35: CPT | Performed by: PHYSICIAN ASSISTANT

## 2024-11-16 RX ADMIN — LORATADINE 10 MG: 10 TABLET ORAL at 08:40

## 2024-11-16 RX ADMIN — POLYETHYLENE GLYCOL 3350 17 G: 17 POWDER, FOR SOLUTION ORAL at 08:45

## 2024-11-16 RX ADMIN — CLOZAPINE 225 MG: 25 TABLET ORAL at 21:10

## 2024-11-16 RX ADMIN — DOCUSATE SODIUM 100 MG: 100 CAPSULE, LIQUID FILLED ORAL at 17:00

## 2024-11-16 RX ADMIN — NICOTINE POLACRILEX 2 MG: 2 GUM, CHEWING ORAL at 09:01

## 2024-11-16 RX ADMIN — GLYCOPYRROLATE 1 MG: 1 TABLET ORAL at 08:40

## 2024-11-16 RX ADMIN — ESCITALOPRAM OXALATE 20 MG: 10 TABLET, FILM COATED ORAL at 08:39

## 2024-11-16 RX ADMIN — NICOTINE POLACRILEX 2 MG: 2 GUM, CHEWING ORAL at 16:59

## 2024-11-16 RX ADMIN — MULTIPLE VITAMINS W/ MINERALS TAB 1 TABLET: TAB ORAL at 08:40

## 2024-11-16 RX ADMIN — NICOTINE POLACRILEX 2 MG: 2 GUM, CHEWING ORAL at 13:02

## 2024-11-16 RX ADMIN — PANTOPRAZOLE SODIUM 20 MG: 20 TABLET, DELAYED RELEASE ORAL at 08:40

## 2024-11-16 RX ADMIN — PROPRANOLOL HYDROCHLORIDE 10 MG: 10 TABLET ORAL at 21:10

## 2024-11-16 RX ADMIN — MELATONIN TAB 3 MG 9 MG: 3 TAB at 21:09

## 2024-11-16 RX ADMIN — GLYCOPYRROLATE 2 MG: 1 TABLET ORAL at 21:09

## 2024-11-16 RX ADMIN — DOCUSATE SODIUM 100 MG: 100 CAPSULE, LIQUID FILLED ORAL at 08:40

## 2024-11-16 RX ADMIN — NICOTINE POLACRILEX 2 MG: 2 GUM, CHEWING ORAL at 21:10

## 2024-11-16 RX ADMIN — SENNOSIDES AND DOCUSATE SODIUM 2 TABLET: 50; 8.6 TABLET ORAL at 21:09

## 2024-11-16 RX ADMIN — ATROPINE SULFATE 1 DROP: 10 SOLUTION/ DROPS OPHTHALMIC at 21:09

## 2024-11-16 RX ADMIN — GLYCOPYRROLATE 1 MG: 1 TABLET ORAL at 14:30

## 2024-11-16 RX ADMIN — ARIPIPRAZOLE 30 MG: 15 TABLET ORAL at 08:39

## 2024-11-16 NOTE — PLAN OF CARE
Problem: Alteration in Thoughts and Perception  Goal: Treatment Goal: Gain control of psychotic behaviors/thinking, reduce/eliminate presenting symptoms and demonstrate improved reality functioning upon discharge  Outcome: Progressing  Goal: Refrain from acting on delusional thinking/internal stimuli  Description: Interventions:  - Monitor patient closely, per order   - Utilize least restrictive measures   - Set reasonable limits, give positive feedback for acceptable   - Administer medications as ordered and monitor of potential side effects  Outcome: Progressing  Goal: Agree to be compliant with medication regime, as prescribed and report medication side effects  Description: Interventions:  - Offer appropriate PRN medication and supervise ingestion; conduct AIMS, as needed   Outcome: Progressing  Goal: Attend and participate in unit activities, including therapeutic, recreational, and educational groups  Description: Interventions:  -Encourage Visitation and family involvement in care  Outcome: Progressing  Goal: Complete daily ADLs, including personal hygiene independently, as able  Description: Interventions:  - Observe, teach, and assist patient with ADLS  - Monitor and promote a balance of rest/activity, with adequate nutrition and elimination   Outcome: Progressing     Problem: Ineffective Coping  Goal: Participates in unit activities  Description: Interventions:  - Provide therapeutic environment   - Provide required programming   - Redirect inappropriate behaviors   Outcome: Progressing  Goal: Patient/Family participate in treatment and DC plans  Description: Interventions:  - Provide therapeutic environment  Outcome: Progressing     Problem: Depression  Goal: Treatment Goal: Demonstrate behavioral control of depressive symptoms, verbalize feelings of improved mood/affect, and adopt new coping skills prior to discharge  Outcome: Progressing  Goal: Verbalize thoughts and feelings  Description:  Interventions:  - Assess and re-assess patient's level of risk   - Engage patient in 1:1 interactions, daily, for a minimum of 15 minutes   - Encourage patient to express feelings, fears, frustrations, hopes   Outcome: Progressing  Goal: Refrain from harming self  Description: Interventions:  - Monitor patient closely, per order   - Supervise medication ingestion, monitor effects and side effects   Outcome: Progressing  Goal: Refrain from isolation  Description: Interventions:  - Develop a trusting relationship   - Encourage socialization   Outcome: Progressing  Goal: Refrain from self-neglect  Outcome: Progressing  Goal: Attend and participate in unit activities, including therapeutic, recreational, and educational groups  Description: Interventions:  - Provide therapeutic and educational activities daily, encourage attendance and participation, and document same in the medical record   Outcome: Progressing     Problem: Anxiety  Goal: Anxiety is at manageable level  Description: Interventions:  - Assess and monitor patient's anxiety level.   - Monitor for signs and symptoms (heart palpitations, chest pain, shortness of breath, headaches, nausea, feeling jumpy, restlessness, irritable, apprehensive).   - Collaborate with interdisciplinary team and initiate plan and interventions as ordered.  - [place] patient to unit/surroundings  - Explain treatment plan  - Encourage participation in care  - Encourage verbalization of concerns/fears  - Identify coping mechanisms  - Assist in developing anxiety-reducing skills  - Administer/offer alternative therapies  - Limit or eliminate stimulants  Outcome: Progressing     Problem: Alteration in Orientation  Goal: Treatment Goal: Demonstrate a reduction of confusion and improved orientation to person, place, time and/or situation upon discharge, according to optimum baseline/ability  Outcome: Progressing  Goal: Interact with staff daily  Description: Interventions:  - Assess and  re-assess patient's level of orientation  - Engage patient in 1 on 1 interactions, daily, for a minimum of 15 minutes   - Establish rapport/trust with patient   Outcome: Progressing  Goal: Complete daily ADLs, including personal hygiene independently, as able  Description: Interventions:  - Observe, teach, and assist patient with ADLS  Outcome: Progressing     Problem: DISCHARGE PLANNING - CARE MANAGEMENT  Goal: Discharge to post-acute care or home with appropriate resources  Description: INTERVENTIONS:  - Conduct assessment to determine patient/family and health care team treatment goals, and need for post-acute services based on payer coverage, community resources, and patient preferences, and barriers to discharge  - Address psychosocial, clinical, and financial barriers to discharge as identified in assessment in conjunction with the patient/family and health care team  - Arrange appropriate level of post-acute services according to patient's   needs and preference and payer coverage in collaboration with the physician and health care team  - Communicate with and update the patient/family, physician, and health care team regarding progress on the discharge plan  - Arrange appropriate transportation to post-acute venues  Outcome: Progressing

## 2024-11-16 NOTE — ASSESSMENT & PLAN NOTE
Reviewed on 11/16/24  Follows with medical  Continue senna-docusate sodium dose per medical team.   No associated orders from this encounter found during lookback period of 72 hours.

## 2024-11-16 NOTE — NURSING NOTE
Pt is calm and cooperative, visible on the unit intermittently, social with peers. Pt continues to report AH, denies SI/HI/AVH. Pt is medication complaint, safety checks ongoing.

## 2024-11-16 NOTE — ASSESSMENT & PLAN NOTE
Reviewed on 11/16/24  Continue to encourage cessation upon discharge  No associated orders from this encounter found during lookback period of 72 hours.

## 2024-11-16 NOTE — PROGRESS NOTES
"This note was not shared with the patient due to reasonable likelihood of causing patient harm    Progress Note - Behavioral Health   Name: Alberto [age] y.o. male I MRN: [id number]  Unit/Bed#: [facility] 112-02 I Date of Admission: 3/29/2024   Date of Service: 11/16/2024 I Hospital Day: 232    Alberto Berumen 29 y.o. male MRN: [id number]   Unit/Bed#: [facility] 112-02 Encounter: [id number]        Assessment & Plan  Schizoaffective disorder, bipolar type (HCC)  Reviewed on 11/16/24    Pt reports continued depression and \"a little\" anxiety as well as continued AH of voices threatening him. He reports he is \"sometimes\" able to ignore these but not always. Reports appetite as been good as has sleep but complaining of some tiredness. Nursing reports HR of 113 yesterday 11/15/24 but manual recheck showed 89. Nursing denies acute behaviors.     Continue Clozapine 225 mg qhs for psychosis -- to consider further careful titration for Sxs depending on response  Continue to obtain CBC with diff, CRP, and CK (weekly on Tues), and a Clozapine level and BNP (every 2 weeks on Tues)    Continue Abilify 30 mg qd for psychosis  Continue Escitalopram 20 mg qd for depression and anxiety  Continue Senna-Docusate sodium one 50 mg tablet daily before bed for constipation  - decreased from two tablets daily on 11/3 due to reported loose stools  Continue propanolol 10 mg bid for anxiety   Continue amlodipine 5 mg PO daily     Pt remains on dual antipsychotic Tx due to failure on monotherapy      ACT team referral was made  No associated orders from this encounter found during lookback period of 72 hours.    Chronic idiopathic constipation  Reviewed on 11/16/24  Follows with medical  Continue senna-docusate sodium dose per medical team.   No associated orders from this encounter found during lookback period of 72 hours.  GERD (gastroesophageal reflux disease)  Reviewed on 11/16/24  Follows with medical  Continue famotidine 20 mg tablet BID per " medical team   Continue glycopyrrolate 1 mg tablet BID AM and afternoon per medical team  Continue glycopyrrolate 2 mg tablet before bed per medical team  Continue pantoprazole EC 20 mg tablet every morning per medical team  No associated orders from this encounter found during lookback period of 72 hours.  Medical clearance for psychiatric admission  Reviewed on 11/16/24  Ongoing by medical  No associated orders from this encounter found during lookback period of 72 hours.    Tobacco abuse  Reviewed on 11/16/24  Continue to encourage cessation upon discharge  No associated orders from this encounter found during lookback period of 72 hours.  Elevated hemoglobin A1c  Reviewed on 11/16/24  Follows with medical  No associated orders from this encounter found during lookback period of 72 hours.  Class 2 obesity in adult  Reviewed on 11/16/24  Follows with medical  No associated orders from this encounter found during lookback period of 72 hours.    Primary hypertension  Reviewed on 11/16/24  Follows with medical  No associated orders from this encounter found during lookback period of 72 hours.     Current medications:  Current Facility-Administered Medications   Medication Dose Route Frequency Provider Last Rate    acetaminophen  650 mg Oral Q4H PRN Jordan C Holter, DO      acetaminophen  650 mg Oral Q6H PRN HOLLI Lion      aluminum-magnesium hydroxide-simethicone  30 mL Oral Q4H PRN Jordan C Holter, DO      amLODIPine  5 mg Oral Daily HOLLI Lion      ARIPiprazole  30 mg Oral Daily Bora Rosario MD      Artificial Tears  1 drop Both Eyes Q3H PRN Jordan C Holter, DO      atropine  1 drop Sublingual HS Harjeet Torres, DO      haloperidol lactate  2.5 mg Intramuscular Q4H PRN Max 4/day HOLLI Lion      And    LORazepam  1 mg Intramuscular Q4H PRN Max 4/day HOLLI Lion      And    benztropine  0.5 mg Intramuscular Q4H PRN Max 4/day HOLLI Lion      benztropine  1 mg Intramuscular Q4H  PRN Max 6/day Ion FISCHER Holter, DO      haloperidol lactate  5 mg Intramuscular Q4H PRN Max 4/day HOLLI Lion      And    LORazepam  2 mg Intramuscular Q4H PRN Max 4/day HOLLI Lion      And    benztropine  1 mg Intramuscular Q4H PRN Max 4/day HOLLI Lion      benztropine  1 mg Oral Q4H PRN Max 6/day HOLLI Lion      benztropine  1 mg Oral Q4H PRN Max 6/day Ion  Cresencioter, DO      bisacodyl  10 mg Rectal Daily PRN HOLLI Lion      calcium carbonate  500 mg Oral BID PRN HOLLI Monge      cloZAPine  225 mg Oral HS Hardik So MD      hydrOXYzine HCL  50 mg Oral Q6H PRN Max 4/day [facility] Holter, DO      Or    diphenhydrAMINE  50 mg Intramuscular Q6H PRN [facility] Holter, DO      hydrOXYzine HCL  50 mg Oral Q6H PRN Max 4/day HOLLI Lion      Or    diphenhydrAMINE  50 mg Intramuscular Q6H PRN HOLLI Lion      diphenhydrAMINE-zinc acetate   Topical BID PRN HOLLI Lion      docusate sodium  100 mg Oral BID Jourdan Dickens DO      escitalopram  20 mg Oral Daily HOLLI Lion      famotidine  20 mg Oral BID PRN HOLLI Monge      fluticasone  1 spray Each Nare Daily PRN HOLLI Monge      glycopyrrolate  1 mg Oral BID (AM & Afternoon) Bora Rosario MD      glycopyrrolate  2 mg Oral HS Bora Rosario MD      haloperidol  1 mg Oral Q6H PRN HOLLI Lion      haloperidol  2.5 mg Oral Q4H PRN Max 4/day HOLLI Lion      haloperidol  5 mg Oral Q4H PRN Max 4/day HOLLI Lion      hydroCHLOROthiazide  12.5 mg Oral Daily Pia Mantilla, STEVENP      hydrocortisone   Topical 4x Daily PRN HOLLI Lion      hydrOXYzine HCL  100 mg Oral Q6H PRN Max 4/day [facility] Holter, DO      Or    LORazepam  2 mg Intramuscular Q6H PRN [facility] Cresencioter, DO      hydrOXYzine HCL  100 mg Oral Q6H PRN Max 4/day HOLLI Lion      Or    LORazepam  2 mg Intramuscular Q6H PRN HOLLI Lion       hydrOXYzine HCL  25 mg Oral Q6H PRN Max 4/day [facility] Holter, DO      ibuprofen  600 mg Oral Q8H PRN HOLLI Lion      influenza vaccine  0.5 mL Intramuscular Once Bora Rosario MD      loratadine  10 mg Oral Daily Brina Guillen MD      magnesium hydroxide  30 mL Oral Once Jourdan Dickens, DO      melatonin  3 mg Oral HS PRN Bora Rosario MD      melatonin  9 mg Oral HS Bora Rosario MD      methocarbamol  500 mg Oral Q6H PRN HOLLI Lion      multivitamin-minerals  1 tablet Oral Daily Eileen Gonzalezmiryamjalyen, HOLLI      nicotine polacrilex  2 mg Oral Q4H PRN Bora Rosario MD      OLANZapine  5 mg Oral Q4H PRN Max 3/day [facility] Holter, DO      Or    OLANZapine  2.5 mg Intramuscular Q4H PRN Max 3/day [facility] Holter, DO      OLANZapine  5 mg Oral Q3H PRN Max 3/day [facility] Holter, DO      Or    OLANZapine  5 mg Intramuscular Q3H PRN Max 3/day [facility] Holter, DO      OLANZapine  2.5 mg Oral Q4H PRN Max 6/day [facility] Holter, DO      ondansetron  4 mg Oral Q6H PRN HOLLI Lion      [facility]oprazole  [facility]      polyethylene glycol  17 g Oral Daily PRN HOLLI Ghotra      polyethylene glycol  17 g Oral Daily Jourdan Dickens, DO      propranolol  10 mg Oral Q12H KAYLIE HOLLI Lion      senna-docusate sodium  1 tablet Oral Daily PRN [facility] Holter, DO      senna-docusate sodium  2 tablet Oral HS Jourdan Dickens, DO      traZODone  50 mg Oral HS PRN HOLLI Lion      white petrolatum-mineral oil   Topical TID PRN HOLLI Lion         Risks / Benefits of Treatment:    Risks, benefits, and possible side effects of medications explained to patient and patient verbalizes understanding and agreement for treatment.    Patient was seen today for continuation of care, records reviewed and patient was discussed with the charge nurse.  No behavioral outbursts or acute events noted overnight and No significant changes in behaviors or clinical status over the last 24  "hours.      Alberto was seen today while in the lunch room. He is known to provider from previous shifts. He reports continued depression but only \"a little\" anxiety. Admits to continued AH of voices threatening him. He feels that sometimes he can ignore them but sometimes it is difficult. He reports feeling tired but that his appetite has come back. States he slept and ate well. Nursing reports that he had HR of 113 yesterday 11/15/24 but upon manual recheck was 89. Nursing denies any acute behaviors.       Alberto does appear overtly anxious or depressed.    He denies any suicidal ideation, intent or plan at present; denies any homicidal ideation, intent or plan at present.  He continues to experience auditory hallucinations of \"voices\", denies any visual hallucinations, denies any delusions.  He denies any side effects from current psychiatric medications.  No medication changes indicated at this time, continue current medication regimen.    Psychiatric Review of Systems:  Behavior over the last 24 hours: unchanged.   Sleep: sleeping okay throughout the night  Appetite: adequate  Medication side effects: none reported  ROS:reports tiredness    Mental Status Evaluation:    Appearance:  disheveled, looks stated age, wearing pajamas   Behavior:  pleasant, cooperative, calm, interacts appropriately with this writer   Speech:  normal rate and volume   Mood:  depressed, anxious   Affect:  constricted   Thought Process:  goal directed, linear   Associations: intact associations   Thought Content:  no overt delusions, no paranoia noted on exam   Perceptual Disturbances: auditory hallucinations of \"voices\", no visual hallucinations   Risk Potential: Suicidal ideation - None at present  Homicidal ideation - None at present  Potential for aggression - Not at present   Sensorium:  oriented to person, place, and time/date   Memory:  recent and remote memory grossly intact   Consciousness:  alert and awake "   Attention/Concentration: attention span and concentration appear shorter than expected for age   Insight:  fair   Judgment: fair   Gait/Station: normal gait/station   Motor Activity: no abnormal movements     Vital signs in last 24 hours:    Temp:  [97.5 °F (36.4 °C)-97.7 °F (36.5 °C)] 97.7 °F (36.5 °C)  HR:  [] 91  BP: (102-126)/(58-71) 102/71  Resp:  [18] 18  SpO2:  [97 %-99 %] 99 %    Laboratory results: I have personally reviewed all pertinent laboratory/tests results    Discharge Disposition: Discharge disposition and planning remain ongoing    Counseling / Coordination of Care:    Medication changes reviewed with nursing staff.  Patient's progress reviewed with nursing staff.  Reassurance and supportive therapy provided.  Group attendance encouraged.  Encouraged participation in milieu and group therapy on the unit.    Axel Bowens 11/16/24

## 2024-11-16 NOTE — ASSESSMENT & PLAN NOTE
Reviewed on 11/16/24  Follows with medical  Continue famotidine 20 mg tablet BID per medical team   Continue glycopyrrolate 1 mg tablet BID AM and afternoon per medical team  Continue glycopyrrolate 2 mg tablet before bed per medical team  Continue pantoprazole EC 20 mg tablet every morning per medical team  No associated orders from this encounter found during lookback period of 72 hours.

## 2024-11-16 NOTE — ASSESSMENT & PLAN NOTE
"Reviewed on 11/16/24    Pt reports continued depression and \"a little\" anxiety as well as continued AH of voices threatening him. He reports he is \"sometimes\" able to ignore these but not always. Reports appetite as been good as has sleep but complaining of some tiredness. Nursing reports HR of 113 yesterday 11/15/24 but manual recheck showed 89. Nursing denies acute behaviors.     Continue Clozapine 225 mg qhs for psychosis -- to consider further careful titration for Sxs depending on response  Continue to obtain CBC with diff, CRP, and CK (weekly on Tues), and a Clozapine level and BNP (every 2 weeks on Tues)    Continue Abilify 30 mg qd for psychosis  Continue Escitalopram 20 mg qd for depression and anxiety  Continue Senna-Docusate sodium one 50 mg tablet daily before bed for constipation  - decreased from two tablets daily on 11/3 due to reported loose stools  Continue propanolol 10 mg bid for anxiety   Continue amlodipine 5 mg PO daily     Pt remains on dual antipsychotic Tx due to failure on monotherapy      ACT team referral was made  No associated orders from this encounter found during lookback period of 72 hours.    "

## 2024-11-16 NOTE — ASSESSMENT & PLAN NOTE
Reviewed on 11/16/24  Follows with medical  No associated orders from this encounter found during lookback period of 72 hours.

## 2024-11-16 NOTE — ASSESSMENT & PLAN NOTE
Reviewed on 11/16/24  Ongoing by medical  No associated orders from this encounter found during lookback period of 72 hours.

## 2024-11-17 VITALS
DIASTOLIC BLOOD PRESSURE: 68 MMHG | TEMPERATURE: 97.5 F | RESPIRATION RATE: 18 BRPM | HEIGHT: 66 IN | SYSTOLIC BLOOD PRESSURE: 131 MMHG | WEIGHT: 245.4 LBS | BODY MASS INDEX: 39.44 KG/M2 | HEART RATE: 90 BPM | OXYGEN SATURATION: 99 %

## 2024-11-17 PROCEDURE — 99232 SBSQ HOSP IP/OBS MODERATE 35: CPT | Performed by: PHYSICIAN ASSISTANT

## 2024-11-17 RX ADMIN — LORATADINE 10 MG: 10 TABLET ORAL at 08:30

## 2024-11-17 RX ADMIN — DOCUSATE SODIUM 100 MG: 100 CAPSULE, LIQUID FILLED ORAL at 17:02

## 2024-11-17 RX ADMIN — PROPRANOLOL HYDROCHLORIDE 10 MG: 10 TABLET ORAL at 21:12

## 2024-11-17 RX ADMIN — CLOZAPINE 225 MG: 25 TABLET ORAL at 21:12

## 2024-11-17 RX ADMIN — NICOTINE POLACRILEX 2 MG: 2 GUM, CHEWING ORAL at 17:02

## 2024-11-17 RX ADMIN — ATROPINE SULFATE 1 DROP: 10 SOLUTION/ DROPS OPHTHALMIC at 21:12

## 2024-11-17 RX ADMIN — SENNOSIDES AND DOCUSATE SODIUM 2 TABLET: 50; 8.6 TABLET ORAL at 22:00

## 2024-11-17 RX ADMIN — ESCITALOPRAM OXALATE 20 MG: 10 TABLET, FILM COATED ORAL at 08:29

## 2024-11-17 RX ADMIN — PANTOPRAZOLE SODIUM 20 MG: 20 TABLET, DELAYED RELEASE ORAL at 08:29

## 2024-11-17 RX ADMIN — NICOTINE POLACRILEX 2 MG: 2 GUM, CHEWING ORAL at 21:12

## 2024-11-17 RX ADMIN — GLYCOPYRROLATE 1 MG: 1 TABLET ORAL at 14:56

## 2024-11-17 RX ADMIN — GLYCOPYRROLATE 1 MG: 1 TABLET ORAL at 08:29

## 2024-11-17 RX ADMIN — NICOTINE POLACRILEX 2 MG: 2 GUM, CHEWING ORAL at 13:00

## 2024-11-17 RX ADMIN — ARIPIPRAZOLE 30 MG: 15 TABLET ORAL at 08:28

## 2024-11-17 RX ADMIN — DOCUSATE SODIUM 100 MG: 100 CAPSULE, LIQUID FILLED ORAL at 08:30

## 2024-11-17 RX ADMIN — PROPRANOLOL HYDROCHLORIDE 10 MG: 10 TABLET ORAL at 08:29

## 2024-11-17 RX ADMIN — GLYCOPYRROLATE 2 MG: 1 TABLET ORAL at 21:12

## 2024-11-17 RX ADMIN — MULTIPLE VITAMINS W/ MINERALS TAB 1 TABLET: TAB ORAL at 08:28

## 2024-11-17 RX ADMIN — HYDROCHLOROTHIAZIDE 12.5 MG: 12.5 TABLET ORAL at 08:28

## 2024-11-17 RX ADMIN — MELATONIN TAB 3 MG 9 MG: 3 TAB at 21:12

## 2024-11-17 NOTE — ASSESSMENT & PLAN NOTE
Reviewed on 11/17/24  Follows with medical  No associated orders from this encounter found during lookback period of 72 hours.

## 2024-11-17 NOTE — ASSESSMENT & PLAN NOTE
Reviewed on 11/17/24  Ongoing by medical  No associated orders from this encounter found during lookback period of 72 hours.

## 2024-11-17 NOTE — ASSESSMENT & PLAN NOTE
Reviewed on 11/17/24  Follows with medical  Continue famotidine 20 mg tablet BID per medical team   Continue glycopyrrolate 1 mg tablet BID AM and afternoon per medical team  Continue glycopyrrolate 2 mg tablet before bed per medical team  Continue pantoprazole EC 20 mg tablet every morning per medical team  No associated orders from this encounter found during lookback period of 72 hours.

## 2024-11-17 NOTE — PROGRESS NOTES
"This note was not shared with the patient due to reasonable likelihood of causing patient harm    Progress Note - Behavioral Health   Name: Alberto Bright y.o. male I MRN: [id number]  Unit/Bed#: [facility] 112-02 I Date of Admission: 3/29/2024   Date of Service: 11/17/2024 I Hospital Day: 233    Alberto Berumen 29 y.o. male MRN: [id number]   Unit/Bed#: [facility] 112-02 Encounter: [id number]        Assessment & Plan  Schizoaffective disorder, bipolar type (HCC)  Reviewed on 11/17/24    Pt continues to report some anxiety and depression, \"same\" as yesterday. Admits to continued AH of voices threatening to hurt him. Denies SI/HI/VH. States eating and sleeping well. Nursing denies acute behaviors. Nursing does report BP meds held yesterday 11/16/24 2/2 low BP.     Continue Clozapine 225 mg qhs for psychosis -- to consider further careful titration for Sxs depending on response  Continue to obtain CBC with diff, CRP, and CK (weekly on Tues), and a Clozapine level and BNP (every 2 weeks on Tues)    Continue Abilify 30 mg qd for psychosis  Continue Escitalopram 20 mg qd for depression and anxiety  Continue Senna-Docusate sodium one 50 mg tablet daily before bed for constipation  - decreased from two tablets daily on 11/3 due to reported loose stools  Continue propanolol 10 mg bid for anxiety   Continue amlodipine 5 mg PO daily     Pt remains on dual antipsychotic Tx due to failure on monotherapy      ACT team referral was made  No associated orders from this encounter found during lookback period of 72 hours.    Chronic idiopathic constipation  Reviewed on 11/17/24  Follows with medical  Continue senna-docusate sodium dose per medical team.   No associated orders from this encounter found during lookback period of 72 hours.  GERD (gastroesophageal reflux disease)  Reviewed on 11/17/24  Follows with medical  Continue famotidine 20 mg tablet BID per medical team   Continue glycopyrrolate 1 mg tablet BID AM and afternoon per " medical team  Continue glycopyrrolate 2 mg tablet before bed per medical team  Continue pantoprazole EC 20 mg tablet every morning per medical team  No associated orders from this encounter found during lookback period of 72 hours.  Medical clearance for psychiatric admission  Reviewed on 11/17/24  Ongoing by medical  No associated orders from this encounter found during lookback period of 72 hours.    Tobacco abuse  Reviewed on 11/17/24  Continue to encourage cessation upon discharge  No associated orders from this encounter found during lookback period of 72 hours.  Elevated hemoglobin A1c  Reviewed on 11/17/24  Follows with medical  No associated orders from this encounter found during lookback period of 72 hours.  Class 2 obesity in adult  Reviewed on 11/17/24  Follows with medical  No associated orders from this encounter found during lookback period of 72 hours.    Primary hypertension  Reviewed on 11/17/24  Follows with medical  No associated orders from this encounter found during lookback period of 72 hours.     Current medications:  Current Facility-Administered Medications   Medication Dose Route Frequency Provider Last Rate    acetaminophen  650 mg Oral Q4H PRN Jordan C Holter, DO      acetaminophen  650 mg Oral Q6H PRN HOLLI Lion      aluminum-magnesium hydroxide-simethicone  30 mL Oral Q4H PRN Jordan C Holter, DO      amLODIPine  5 mg Oral Daily HOLLI Lion      ARIPiprazole  30 mg Oral Daily Bora Rosario MD      Artificial Tears  1 drop Both Eyes Q3H PRN Jordan C Holter, DO      atropine  1 drop Sublingual HS Harjeet Torres, DO      haloperidol lactate  2.5 mg Intramuscular Q4H PRN Max 4/day HOLLI Lion      And    LORazepam  1 mg Intramuscular Q4H PRN Max 4/day HOLLI Lion      And    benztropine  0.5 mg Intramuscular Q4H PRN Max 4/day HOLLI Lion      benztropine  1 mg Intramuscular Q4H PRN Max 6/day Jordan C Holter, DO      haloperidol lactate  5 mg  Intramuscular Q4H PRN Max 4/day HOLLI Lion      And    LORazepam  2 mg Intramuscular Q4H PRN Max 4/day HOLLI Lion      And    benztropine  1 mg Intramuscular Q4H PRN Max 4/day HOLLI Lion      benztropine  1 mg Oral Q4H PRN Max 6/day HOLLI Lion      benztropine  1 mg Oral Q4H PRN Max 6/day Ion Dupontter, DO      bisacodyl  10 mg Rectal Daily PRN HOLLI Lion      calcium carbonate  500 mg Oral BID PRN HOLLI Monge      cloZAPine  225 mg Oral HS Hardik So MD      hydrOXYzine HCL  50 mg Oral Q6H PRN Max 4/day Jordan C Holter, DO      Or    diphenhydrAMINE  50 mg Intramuscular Q6H PRN Jordan C Holter, DO      hydrOXYzine HCL  50 mg Oral Q6H PRN Max 4/day HOLLI Lion      Or    diphenhydrAMINE  50 mg Intramuscular Q6H PRN HOLLI Lion      diphenhydrAMINE-zinc acetate   Topical BID PRN HOLLI Lion      docusate sodium  100 mg Oral BID Jourdan Dickens,       escitalopram  20 mg Oral Daily HOLLI Lion      famotidine  20 mg Oral BID PRN HOLLI Monge      fluticasone  1 spray Each Nare Daily PRN HOLLI Monge      glycopyrrolate  1 mg Oral BID (AM & Afternoon) Bora Rosario MD      glycopyrrolate  2 mg Oral HS Bora Rosario MD      haloperidol  1 mg Oral Q6H PRN HOLLI Lion      haloperidol  2.5 mg Oral Q4H PRN Max 4/day HOLLI Lion      haloperidol  5 mg Oral Q4H PRN Max 4/day HOLLI Lion      hydroCHLOROthiazide  12.5 mg Oral Daily Pia Mantilla, HOLLI      hydrocortisone   Topical 4x Daily PRN HOLLI Lion      hydrOXYzine HCL  100 mg Oral Q6H PRN Max 4/day Jordan C Holter, DO      Or    LORazepam  2 mg Intramuscular Q6H PRN Ion Dupontter, DO      hydrOXYzine HCL  100 mg Oral Q6H PRN Max 4/day HOLLI Lion      Or    LORazepam  2 mg Intramuscular Q6H PRN HOLLI Lion      hydrOXYzine HCL  25 mg Oral Q6H PRN Max 4/day Jordan C Holter,        ibuprofen  600 mg Oral Q8H PRN HOLLI Lion      influenza vaccine  0.5 mL Intramuscular Once Bora Rosario MD      loratadine  10 mg Oral Daily Brina Guillen MD      magnesium hydroxide  30 mL Oral Once Jourdan Dickens, DO      melatonin  3 mg Oral HS PRN Bora Rosario MD      melatonin  9 mg Oral HS Bora Rosario MD      methocarbamol  500 mg Oral Q6H PRN HOLLI Lion      multivitamin-minerals  1 tablet Oral Daily Eileen CherryHOLLI Jimenez      nicotine polacrilex  2 mg Oral Q4H PRN Bora Rosario MD      OLANZapine  5 mg Oral Q4H PRN Max 3/day [facility] Holter, DO      Or    OLANZapine  2.5 mg Intramuscular Q4H PRN Max 3/day [facility] Holter, DO      OLANZapine  5 mg Oral Q3H PRN Max 3/day [facility] Holter, DO      Or    OLANZapine  5 mg Intramuscular Q3H PRN Max 3/day [facility] Holter, DO      OLANZapine  2.5 mg Oral Q4H PRN Max 6/day [facility] Holter, DO      ondansetron  4 mg Oral Q6H PRN HOLLI Lion      pantoprazole  20 mg Oral QAM [facility] Holter, DO      polyethylene glycol  17 g Oral Daily PRN Sofi Yoo, HOLLI      polyethylene glycol  17 g Oral Daily Jourdan Dickens, DO      propranolol  10 mg Oral Q12H KAYLIE HOLLI Lion      senna-docusate sodium  1 tablet Oral Daily PRN [facility] Holter, DO      senna-docusate sodium  2 tablet Oral HS Jourdan Dickens, DO      traZODone  50 mg Oral HS PRN HOLLI Lion      white petrolatum-mineral oil   Topical TID PRN HOLLI Lion         Risks / Benefits of Treatment:    Risks, benefits, and possible side effects of medications explained to patient and patient verbalizes understanding and agreement for treatment.    Patient was seen today for continuation of care, records reviewed and patient was discussed with the charge nurse.  No behavioral outbursts or acute events noted overnight and No significant changes in behaviors or clinical status over the last 24 hours.      Alberto was seen today while on the unit. He remains  "pleasant and cooperative. He admits to continued depression and anxiety as well as continued AH of voices threatening to hurt him. Still admits to tiredness as well. Reports eating and sleeping well. Denies SI/HI/VH. Nursing denies acute behaviors. BP meds held yesterday 2/2 low BP.     Alberto does appear overtly anxious or depressed.    He denies any suicidal ideation, intent or plan at present; denies any homicidal ideation, intent or plan at present.  He denies any, continues to experience auditory hallucinations of \"voices\", denies any visual hallucinations, denies any delusions.  He denies any side effects from current psychiatric medications.  No medication changes indicated at this time, continue current medication regimen.    Psychiatric Review of Systems:  Behavior over the last 24 hours: unchanged.   Sleep: sleeping okay throughout the night  Appetite: adequate  Medication side effects: none reported  ROS:reports tiredness    Mental Status Evaluation:    Appearance:  adequate grooming, looks stated age, wearing pajamas   Behavior:  pleasant, cooperative, calm, interacts appropriately with this writer   Speech:  normal rate and volume   Mood:  depressed, anxious   Affect:  constricted   Thought Process:  goal directed, linear   Associations: intact associations   Thought Content:  no overt delusions, no paranoia noted on exam   Perceptual Disturbances: denies auditory or visual hallucinations when asked, does not appear responding to internal stimuli   Risk Potential: Suicidal ideation - None at present  Homicidal ideation - None at present  Potential for aggression - Not at present   Sensorium:  oriented to person, place, and time/date   Memory:  recent and remote memory grossly intact   Consciousness:  alert and awake   Attention/Concentration: attention span and concentration appear shorter than expected for age   Insight:  fair   Judgment: fair   Gait/Station: normal gait/station   Motor Activity: no " abnormal movements     Vital signs in last 24 hours:    Temp:  [97.5 °F (36.4 °C)-97.9 °F (36.6 °C)] 97.9 °F (36.6 °C)  HR:  [88-97] 88  BP: (109-118)/(55-80) 118/80  Resp:  [18] 18  SpO2:  [98 %] 98 %  O2 Device: None (Room air)    Laboratory results: I have personally reviewed all pertinent laboratory/tests results    Discharge Disposition: Discharge disposition and planning remain ongoing    Counseling / Coordination of Care:    Medication changes reviewed with nursing staff.  Patient's progress reviewed with nursing staff.  Reassurance and supportive therapy provided.  Group attendance encouraged.  Encouraged participation in milieu and group therapy on the unit.    Axel Bowens 11/17/24

## 2024-11-17 NOTE — PLAN OF CARE
Problem: Alteration in Thoughts and Perception  Goal: Agree to be compliant with medication regime, as prescribed and report medication side effects  Description: Interventions:  - Offer appropriate PRN medication and supervise ingestion; conduct AIMS, as needed   Outcome: Progressing  Goal: Attend and participate in unit activities, including therapeutic, recreational, and educational groups  Description: Interventions:  -Encourage Visitation and family involvement in care  Outcome: Progressing  Goal: Complete daily ADLs, including personal hygiene independently, as able  Description: Interventions:  - Observe, teach, and assist patient with ADLS  - Monitor and promote a balance of rest/activity, with adequate nutrition and elimination   Outcome: Progressing     Problem: Depression  Goal: Refrain from harming self  Description: Interventions:  - Monitor patient closely, per order   - Supervise medication ingestion, monitor effects and side effects   Outcome: Progressing  Goal: Refrain from isolation  Description: Interventions:  - Develop a trusting relationship   - Encourage socialization   Outcome: Progressing  Goal: Refrain from self-neglect  Outcome: Progressing  Goal: Attend and participate in unit activities, including therapeutic, recreational, and educational groups  Description: Interventions:  - Provide therapeutic and educational activities daily, encourage attendance and participation, and document same in the medical record   Outcome: Progressing     Problem: Anxiety  Goal: Anxiety is at manageable level  Description: Interventions:  - Assess and monitor patient's anxiety level.   - Monitor for signs and symptoms (heart palpitations, chest pain, shortness of breath, headaches, nausea, feeling jumpy, restlessness, irritable, apprehensive).   - Collaborate with interdisciplinary team and initiate plan and interventions as ordered.  - Alfred patient to unit/surroundings  - Explain treatment plan  -  Encourage participation in care  - Encourage verbalization of concerns/fears  - Identify coping mechanisms  - Assist in developing anxiety-reducing skills  - Administer/offer alternative therapies  - Limit or eliminate stimulants  Outcome: Progressing     Problem: Alteration in Orientation  Goal: Interact with staff daily  Description: Interventions:  - Assess and re-assess patient's level of orientation  - Engage patient in 1 on 1 interactions, daily, for a minimum of 15 minutes   - Establish rapport/trust with patient   Outcome: Progressing  Goal: Allow medical examinations, as recommended  Description: Interventions:  - Provide physical/neurological exams and/or referrals, per provider   Outcome: Progressing  Goal: Cooperate with recommended testing/procedures  Description: Interventions:  - Determine need for ancillary testing  - Observe for mental status changes  - Implement falls/precaution protocol   Outcome: Progressing

## 2024-11-17 NOTE — NURSING NOTE
Patient requested PRN Nicorette gum at this time.  Patient visible and social with select peers. Pleasant upon approach as well. Medication compliant. Behaviors controlled and appropriate. Offered no complaints of any kind.

## 2024-11-17 NOTE — ASSESSMENT & PLAN NOTE
"Reviewed on 11/17/24    Pt continues to report some anxiety and depression, \"same\" as yesterday. Admits to continued AH of voices threatening to hurt him. Denies SI/HI/VH. States eating and sleeping well. Nursing denies acute behaviors. Nursing does report BP meds held yesterday 11/16/24 2/2 low BP.     Continue Clozapine 225 mg qhs for psychosis -- to consider further careful titration for Sxs depending on response  Continue to obtain CBC with diff, CRP, and CK (weekly on Tues), and a Clozapine level and BNP (every 2 weeks on Tues)    Continue Abilify 30 mg qd for psychosis  Continue Escitalopram 20 mg qd for depression and anxiety  Continue Senna-Docusate sodium one 50 mg tablet daily before bed for constipation  - decreased from two tablets daily on 11/3 due to reported loose stools  Continue propanolol 10 mg bid for anxiety   Continue amlodipine 5 mg PO daily     Pt remains on dual antipsychotic Tx due to failure on monotherapy      ACT team referral was made  No associated orders from this encounter found during lookback period of 72 hours.    "

## 2024-11-17 NOTE — NURSING NOTE
ANTICOAGULATION MANAGEMENT     Akila Monte 49 year old female is on warfarin with supratherapeutic INR result. (Goal INR 2.0-3.0)    Recent labs: (last 7 days)     03/05/24  1220   INR 3.31*       ASSESSMENT     Source(s): Chart Review and Patient/Caregiver Call     Warfarin doses taken: Warfarin taken as instructed  Diet: No new diet changes identified  Medication/supplement changes: None noted  New illness, injury, or hospitalization: No  Signs or symptoms of bleeding or clotting: No  Previous result: Therapeutic last 2(+) visits  Additional findings: None       PLAN     Recommended plan for no diet, medication or health factor changes affecting INR     Dosing Instructions: Continue your current warfarin dose with next INR in 1 week       Summary  As of 3/5/2024      Full warfarin instructions:  5 mg every Mon, Wed, Sat; 4 mg all other days   Next INR check:  3/12/2024               Telephone call with Zuleika who verbalizes understanding and agrees to plan    Check at provider office visit    Education provided:   Please call back if any changes to your diet, medications or how you've been taking warfarin    Plan made per [facility] anticoagulation protocol    Zhanna Ng RN  Anticoagulation Clinic  3/5/2024    _______________________________________________________________________     Anticoagulation Episode Summary       Current INR goal:  2.0-3.0   TTR:  45.5% (11.2 mo)   Target end date:  Indefinite   Send INR reminders to:  [facility]    Indications    Long-term (current) use of anticoagulants [Z79.01] [Z79.01]  Deep vein thrombosis (DVT) (H) [I82.409] [I82.409]             Comments:               Anticoagulation Care Providers       Provider Role Specialty Phone number    Issac Campbell MD Referring Pulmonary Disease [phone]             Pt is pleasant, calm, and cooperative, visible on the unit, social with peers and staff. Pt continues to report AH, denies SI/HI/AVH. Pt is meal and medication complaint, safety checks ongoing.

## 2024-11-17 NOTE — ASSESSMENT & PLAN NOTE
Reviewed on 11/17/24  Continue to encourage cessation upon discharge  No associated orders from this encounter found during lookback period of 72 hours.

## 2024-11-17 NOTE — ASSESSMENT & PLAN NOTE
Reviewed on 11/17/24  Follows with medical  Continue senna-docusate sodium dose per medical team.   No associated orders from this encounter found during lookback period of 72 hours.

## 2024-11-18 PROCEDURE — 99232 SBSQ HOSP IP/OBS MODERATE 35: CPT | Performed by: PSYCHIATRY & NEUROLOGY

## 2024-11-18 RX ADMIN — MULTIPLE VITAMINS W/ MINERALS TAB 1 TABLET: TAB ORAL at 08:53

## 2024-11-18 RX ADMIN — GLYCOPYRROLATE 2 MG: 1 TABLET ORAL at 21:17

## 2024-11-18 RX ADMIN — ARIPIPRAZOLE 30 MG: 15 TABLET ORAL at 08:53

## 2024-11-18 RX ADMIN — GLYCOPYRROLATE 1 MG: 1 TABLET ORAL at 08:53

## 2024-11-18 RX ADMIN — NICOTINE POLACRILEX 2 MG: 2 GUM, CHEWING ORAL at 21:17

## 2024-11-18 RX ADMIN — PANTOPRAZOLE SODIUM 20 MG: 20 TABLET, DELAYED RELEASE ORAL at 08:53

## 2024-11-18 RX ADMIN — AMLODIPINE BESYLATE 5 MG: 5 TABLET ORAL at 08:53

## 2024-11-18 RX ADMIN — CLOZAPINE 225 MG: 25 TABLET ORAL at 21:17

## 2024-11-18 RX ADMIN — ACETAMINOPHEN 650 MG: 325 TABLET, FILM COATED ORAL at 19:27

## 2024-11-18 RX ADMIN — POLYETHYLENE GLYCOL 3350 17 G: 17 POWDER, FOR SOLUTION ORAL at 08:56

## 2024-11-18 RX ADMIN — SENNOSIDES AND DOCUSATE SODIUM 2 TABLET: 50; 8.6 TABLET ORAL at 21:16

## 2024-11-18 RX ADMIN — NICOTINE POLACRILEX 2 MG: 2 GUM, CHEWING ORAL at 17:14

## 2024-11-18 RX ADMIN — ESCITALOPRAM OXALATE 20 MG: 10 TABLET, FILM COATED ORAL at 08:53

## 2024-11-18 RX ADMIN — GLYCOPYRROLATE 1 MG: 1 TABLET ORAL at 14:30

## 2024-11-18 RX ADMIN — ATROPINE SULFATE 1 DROP: 10 SOLUTION/ DROPS OPHTHALMIC at 21:16

## 2024-11-18 RX ADMIN — HYDROCHLOROTHIAZIDE 12.5 MG: 12.5 TABLET ORAL at 08:53

## 2024-11-18 RX ADMIN — PROPRANOLOL HYDROCHLORIDE 10 MG: 10 TABLET ORAL at 08:53

## 2024-11-18 RX ADMIN — MELATONIN TAB 3 MG 9 MG: 3 TAB at 21:16

## 2024-11-18 RX ADMIN — NICOTINE POLACRILEX 2 MG: 2 GUM, CHEWING ORAL at 09:19

## 2024-11-18 RX ADMIN — NICOTINE POLACRILEX 2 MG: 2 GUM, CHEWING ORAL at 13:10

## 2024-11-18 RX ADMIN — LORATADINE 10 MG: 10 TABLET ORAL at 08:53

## 2024-11-18 RX ADMIN — PROPRANOLOL HYDROCHLORIDE 10 MG: 10 TABLET ORAL at 21:17

## 2024-11-18 RX ADMIN — DOCUSATE SODIUM 100 MG: 100 CAPSULE, LIQUID FILLED ORAL at 17:14

## 2024-11-18 RX ADMIN — DOCUSATE SODIUM 100 MG: 100 CAPSULE, LIQUID FILLED ORAL at 08:53

## 2024-11-18 NOTE — PROGRESS NOTES
11/18/24 0822   Team Meeting   Meeting Type Daily Rounds   Team Members Present   Team Members Present Physician;Nurse;;Other (Discipline and Name)   Physician Team Member Holter, Thomas   Nursing Team Member Claudia   Social Work Team Member Jose J ORTEGA   Other (Discipline and Name) Wang PCM   Patient/Family Present   Patient Present No   Patient's Family Present No     Groups Participation  9/10.   Patient's compliant with medications. He's engaged in his treatment. He's pleasant and cooperative.

## 2024-11-18 NOTE — ASSESSMENT & PLAN NOTE
Reviewed on 11/18/24  Follows with medical  Continue senna-docusate sodium dose per medical team.   No associated orders from this encounter found during lookback period of 72 hours.

## 2024-11-18 NOTE — NURSING NOTE
Pt is pleasant, calm, and cooperative, visible on the unit, social with peers and staff. Pt continues to reports AH, voices saying they are going to kill his family. Pt denies SI/HI/VH. Pt is medication complaint, safety checks ongoing.

## 2024-11-18 NOTE — NURSING NOTE
Pt is visible on the unit and social with select peers. Consumed 100% of dinner. Took medications without incidence. Pt is pleasant and cooperative. Attended 8/9 groups. Reports AH that threaten him and his family. He was using the punching bag this shift as a coping mechanism. Denies SI/HI/VH. No behavioral issues. Pt offers no complaints.

## 2024-11-18 NOTE — ASSESSMENT & PLAN NOTE
Reviewed on 11/18/24  Ongoing by medical  No associated orders from this encounter found during lookback period of 72 hours.

## 2024-11-18 NOTE — ASSESSMENT & PLAN NOTE
Reviewed on 11/18/24  Continue to encourage cessation upon discharge  No associated orders from this encounter found during lookback period of 72 hours.

## 2024-11-18 NOTE — ASSESSMENT & PLAN NOTE
Reviewed on 11/18/24  Follows with medical  Continue famotidine 20 mg tablet BID per medical team   Continue glycopyrrolate 1 mg tablet BID AM and afternoon per medical team  Continue glycopyrrolate 2 mg tablet before bed per medical team  Continue pantoprazole EC 20 mg tablet every morning per medical team  No associated orders from this encounter found during lookback period of 72 hours.

## 2024-11-18 NOTE — NURSING NOTE
Patient appears depressed and more isolative than usual. Patient did not come to nursing group and did not play BINGO either. Patient scant with conversation as well. Patient did come out for his scheduled HS medications without any prompting and did not stay out of his room too long. Behaviors controlled.

## 2024-11-18 NOTE — ASSESSMENT & PLAN NOTE
"Reviewed on 11/18/24    Patient reports feelings of depression and anxiety, racing thoughts, hopelessness, low-self esteem, and low energy. He denies mood swings. He states he slept \"okay\" last night but is feeling tired today. He states he is eating well. He states his mood is \"depressed\". He states his depression is a 10/10 this morning. He states he is depressed and anxious due to the voices in his head constantly telling him they are going to kill him and that he is going to hell. He states the voices are random people and not his own voice. He states he has been hearing this voices for over 2 years. He denies any visual hallucinations. He reports persecutory delusions in regards to the voices he is hearing but denies grandiose, somatic, or bizarre delusions. He reports passive death wishes but denies thoughts of harm to self and others, SI, or HI.    Continue Clozapine 225 mg qhs for psychosis -- to consider further careful titration for Sxs depending on response  Continue to obtain CBC with diff, CRP, and CK (weekly on Tues), and a Clozapine level and BNP (every 2 weeks on Tues)    Continue Abilify 30 mg once daily for psychosis  Continue Escitalopram 20 mg once daily for depression and anxiety  Continue Senna-Docusate sodium one 50 mg tablet daily before bed for constipation  - decreased from two tablets daily on 11/3 due to reported loose stools  Continue propanolol 10 mg twice daily for anxiety   Continue amlodipine 5 mg PO daily     Pt remains on dual antipsychotic Tx due to failure on monotherapy      ACT team referral was made  No associated orders from this encounter found during lookback period of 72 hours.    "

## 2024-11-18 NOTE — ASSESSMENT & PLAN NOTE
Reviewed on 11/18/24  Follows with medical  No associated orders from this encounter found during lookback period of 72 hours.

## 2024-11-18 NOTE — NURSING NOTE
Pt is calm, cooperative and visible on unit. Pt reports AH, depression and anxiety; denies SI/HI/VH. Compliant with medications and meals. Pt interacts appropriately with staff and peers. Weekly wellness assessment completed and WNL. Q 15 min checks maintained.    You can access the FollowMyHealth Patient Portal offered by [facility] by registering at the following website: [email or web address]. By joining [employer]’s FollowMyHealth portal, you will also be able to view your health information using other applications (apps) compatible with our system.

## 2024-11-18 NOTE — PROGRESS NOTES
"Progress Note - Behavioral Health   Name: Alberto Berumen 29 y.o. male I MRN: [id number]  Unit/Bed#: [facility] 112-02 I Date of Admission: 3/29/2024   Date of Service: 11/18/2024 I Hospital Day: 234     Assessment & Plan  Schizoaffective disorder, bipolar type (HCC)  Reviewed on 11/18/24    Patient reports feelings of depression and anxiety, racing thoughts, hopelessness, low-self esteem, and low energy. He denies mood swings. He states he slept \"okay\" last night but is feeling tired today. He states he is eating well. He states his mood is \"depressed\". He states his depression is a 10/10 this morning. He states he is depressed and anxious due to the voices in his head constantly telling him they are going to kill him and that he is going to hell. He states the voices are random people and not his own voice. He states he has been hearing this voices for over 2 years. He denies any visual hallucinations. He reports persecutory delusions in regards to the voices he is hearing but denies grandiose, somatic, or bizarre delusions. He reports passive death wishes but denies thoughts of harm to self and others, SI, or HI.    Continue Clozapine 225 mg qhs for psychosis -- to consider further careful titration for Sxs depending on response  Continue to obtain CBC with diff, CRP, and CK (weekly on Tues), and a Clozapine level and BNP (every 2 weeks on Tues)    Continue Abilify 30 mg once daily for psychosis  Continue Escitalopram 20 mg once daily for depression and anxiety  Continue Senna-Docusate sodium one 50 mg tablet daily before bed for constipation  - decreased from two tablets daily on 11/3 due to reported loose stools  Continue propanolol 10 mg twice daily for anxiety   Continue amlodipine 5 mg PO daily     Pt remains on dual antipsychotic Tx due to failure on monotherapy      ACT team referral was made  No associated orders from this encounter found during lookback period of 72 hours.    Chronic idiopathic " constipation  Reviewed on 11/18/24  Follows with medical  Continue senna-docusate sodium dose per medical team.   No associated orders from this encounter found during lookback period of 72 hours.  GERD (gastroesophageal reflux disease)  Reviewed on 11/18/24  Follows with medical  Continue famotidine 20 mg tablet BID per medical team   Continue glycopyrrolate 1 mg tablet BID AM and afternoon per medical team  Continue glycopyrrolate 2 mg tablet before bed per medical team  Continue pantoprazole EC 20 mg tablet every morning per medical team  No associated orders from this encounter found during lookback period of 72 hours.  Medical clearance for psychiatric admission  Reviewed on 11/18/24  Ongoing by medical  No associated orders from this encounter found during lookback period of 72 hours.    Tobacco abuse  Reviewed on 11/18/24  Continue to encourage cessation upon discharge  No associated orders from this encounter found during lookback period of 72 hours.  Elevated hemoglobin A1c  Reviewed on 11/18/24  Follows with medical  No associated orders from this encounter found during lookback period of 72 hours.  Class 2 obesity in adult  Reviewed on 11/18/24  Follows with medical  No associated orders from this encounter found during lookback period of 72 hours.    Primary hypertension  Reviewed on 11/18/24  Follows with medical  No associated orders from this encounter found during lookback period of 72 hours.    Psychiatry Progress Note [facility]    Progress towards goals: Progressing    Review of systems: Unremarkable    Psychiatric Diagnosis: Schizoaffective disorder, bipolar type      Assessment  Overall Status:  Improving  Certification Statement: The patient will continue to require additional inpatient hospital stay due to schizoaffective disorder bipolar type and related symptoms       Medications: as above  Side effects from treatment: Denied  Medication changes: as above  Medication  "education: Risks side effects benefits and precautions of medications discussed with patient and they did verbalize an understanding about risks for metabolic syndrome from being on neuroleptics and tardive dyskinesia etc.  All medications reviewed and I recommend that they be continued for symptom management  Understanding of medications: Risks side effects benefits and precautions of medications discussed with patient and they did verbalize an understanding about risks for metabolic syndrome from being on neuroleptics and tardive dyskinesia etc., and patient appeared to have understanding.  Justification for dual anti-psychotics: Due to failure on monotherapy    Non-pharmacological treatments  Continue with individual, group, milieu and occupational therapy using recovery principles and psycho-education about accepting illness and the need for treatment.  Behavioral health checks every 7 minutes    Safety  Safety and communication plan established to target dynamic risk factors discussed above.    Discharge Plan   Waiting for [facility] to fund an ACT team as he lost his MA benefits and to live with aunt.    Interval Progress: Per treatment team, patient is compliant with meals and medications. Team notes patient has improved appetite and has gained 4.5 lbs in the past week and requests the ensure drinks are stopped. Patient did not need any PRN psychotropic medications yesterday or this morning. Patient attended 9/10 groups on Friday.     Today, patient evaluated by this writer and Dr. Rosario in [facility]. He states his mood is \"depressed\" due to the voices he hears constantly telling him they are going to kill him and that he is going to hell. He states his depression is a 10/10 this morning. He states the voices are random people and not his own voice. He denies any visual hallucinations. He reports persecutory delusions in regards to the voices he is hearing but denies grandiose, somatic, or bizarre delusions. " "Patient reports feelings of depression and anxiety, racing thoughts, hopelessness, low-self esteem, and low energy. He denies mood swings. He states he slept \"okay\" last night but is feeling tired today. He states he is eating well. He reports passive death wishes but denies thoughts of harm to self and others, SI, or HI. Patient denies any adverse medication effects.     Acceptance by patient: Accepting of medications and treatment  Hopefulness in recovery: Living with his aunt  Involved in reintegration process: Patient communication with sister and aunt on phone.  Trusting in relationship with psychiatrist: Present  Sleep: Fair  Appetite: Improved  Compliance with Medications: Compliant  Group attendance: 9/10 groups on Friday  Significant events: None in the past 24 hours    Mental Status Exam  Appearance: age appropriate, casually dressed, looks stated age, disheveled  Behavior: cooperative, calm, fair  eye contact  Speech: normal rate, normal pitch, low volume   Mood: \"depressed\"  Affect: constricted, mood-congruent  Thought Process: organized, logical, coherent, goal directed  Thought Content: mild paranoia regarding threatening voices  Perceptual Disturbances: auditory hallucinations of voices telling him they are going to kill him and that he is going to hell , Denies visual hallucinations. Does not appear to be responding to internal stimuli  Sensorium: alert and oriented to person, place, time and situation  Cognition: recent and remote memory grossly intact  Consciousness: alert and awake  Attention: attention span and concentration are age appropriate  Intellect: appears to be of average intelligence  Insight: fair  Judgement: fair  Motor Activity: no abnormal movements     Vitals  Temp:  [97.5 °F (36.4 °C)-98 °F (36.7 °C)] 98 °F (36.7 °C)  HR:  [90-91] 91  Resp:  [18] 18  BP: (131-140)/(68-86) 140/69  SpO2:  [98 %-99 %] 98 %  No intake or output data in the 24 hours ending 11/18/24 1212    Lab Results: " All Labs For Current Hospital Admission Reviewed  Results from last 7 days   Lab Units 11/12/24  0907   WBC Thousand/uL 8.89   RBC Million/uL 5.51   HEMOGLOBIN g/dL 13.2   HEMATOCRIT % 43.5   MCV fL 79*   PLATELETS Thousands/uL 238   TOTAL NEUT ABS Thousands/µL 4.58       Current Facility-Administered Medications   Medication Dose Route Frequency Provider Last Rate    acetaminophen  650 mg Oral Q4H PRN Jordan C Holter,       acetaminophen  650 mg Oral Q6H PRN HOLLI Lion      aluminum-magnesium hydroxide-simethicone  30 mL Oral Q4H PRN Jordan C Holter, DO      amLODIPine  5 mg Oral Daily HOLLI Lion      ARIPiprazole  30 mg Oral Daily Bora Rosario MD      Artificial Tears  1 drop Both Eyes Q3H PRN Jordan C Holter,       atropine  1 drop Sublingual HS Harjeet Torres DO      haloperidol lactate  2.5 mg Intramuscular Q4H PRN Max 4/day HOLLI Lion      And    LORazepam  1 mg Intramuscular Q4H PRN Max 4/day HOLLI Lion      And    benztropine  0.5 mg Intramuscular Q4H PRN Max 4/day HOLLI Lion      benztropine  1 mg Intramuscular Q4H PRN Max 6/day Jordan C Holter, DO      haloperidol lactate  5 mg Intramuscular Q4H PRN Max 4/day HOLLI Lion      And    LORazepam  2 mg Intramuscular Q4H PRN Max 4/day HOLLI Lion      And    benztropine  1 mg Intramuscular Q4H PRN Max 4/day HOLLI Lion      benztropine  1 mg Oral Q4H PRN Max 6/day HOLLI Lion      benztropine  1 mg Oral Q4H PRN Max 6/day Jordan C Holter, DO      bisacodyl  10 mg Rectal Daily PRN HOLLI Lion      calcium carbonate  500 mg Oral BID PRN HOLLI Monge      cloZAPine  225 mg Oral HS Hardik So MD      hydrOXYzine HCL  50 mg Oral Q6H PRN Max 4/day Jordan C Holter, DO      Or    diphenhydrAMINE  50 mg Intramuscular Q6H PRN Jordan C Holter, DO      hydrOXYzine HCL  50 mg Oral Q6H PRN Max 4/day Eveline Hangey, CRNP      Or    diphenhydrAMINE  50 mg  Intramuscular Q6H PRN HOLLI Lion      diphenhydrAMINE-zinc acetate   Topical BID PRN Eveline HOLLI Hunt      docusate sodium  100 mg Oral BID Jourdan Dickens, DO      escitalopram  20 mg Oral Daily HOLLI Lion      famotidine  20 mg Oral BID PRN Eileen HOLLI Higuera      fluticasone  1 spray Each Nare Daily PRN EileenHOLLI Cummins      glycopyrrolate  1 mg Oral BID (AM & Afternoon) Bora Rosario MD      glycopyrrolate  2 mg Oral HS Bora Rosario MD      haloperidol  1 mg Oral Q6H PRN EvelineHOLLI Christy      haloperidol  2.5 mg Oral Q4H PRN Max 4/day HOLLI Lion      haloperidol  5 mg Oral Q4H PRN Max 4/day HOLLI Lion      hydroCHLOROthiazide  12.5 mg Oral Daily Pia Mantilla, HOLLI      hydrocortisone   Topical 4x Daily PRN HOLLI Lion      hydrOXYzine HCL  100 mg Oral Q6H PRN Max 4/day [facility] Holter, DO      Or    LORazepam  2 mg Intramuscular Q6H PRN [facility] Holter, DO      hydrOXYzine HCL  100 mg Oral Q6H PRN Max 4/day HOLLI Lion      Or    LORazepam  2 mg Intramuscular Q6H PRN HOLLI Lion      hydrOXYzine HCL  25 mg Oral Q6H PRN Max 4/day Ion  Holter, DO      ibuprofen  600 mg Oral Q8H PRN HOLLI Lion      influenza vaccine  0.5 mL Intramuscular Once Bora Rosario MD      loratadine  10 mg Oral Daily Brina Guillen MD      magnesium hydroxide  30 mL Oral Once Jourdan Dickens, DO      melatonin  3 mg Oral HS PRN Bora Rosario MD      melatonin  9 mg Oral HS Bora Rosario MD      methocarbamol  500 mg Oral Q6H PRN HOLLI Lion      multivitamin-minerals  1 tablet Oral Daily HOLLI Monge      nicotine polacrilex  2 mg Oral Q4H PRN Bora Rosario MD      OLANZapine  5 mg Oral Q4H PRN Max 3/day Ion C Holter, DO      Or    OLANZapine  2.5 mg Intramuscular Q4H PRN Max 3/day Ion C Holter, DO      OLANZapine  5 mg Oral Q3H PRN Max 3/day Ion FISCHER Holter, DO      Or    OLANZapine  5 mg Intramuscular Q3H  PRN Max 3/day Jordan C Holter, DO      OLANZapine  2.5 mg Oral Q4H PRN Max 6/day Jordan C Holter, DO      ondansetron  4 mg Oral Q6H PRN HOLLI Lion      pantoprazole  20 mg Oral QAM Ion FISCHER Holter, DO      polyethylene glycol  17 g Oral Daily PRN HOLLI Ghotra      polyethylene glycol  17 g Oral Daily Jourdan Dickens, DO      propranolol  10 mg Oral Q12H KAYLIE HOLLI Lion      senna-docusate sodium  1 tablet Oral Daily PRN Jordan C Holter, DO      senna-docusate sodium  2 tablet Oral HS Jourdan Dickens, DO      traZODone  50 mg Oral HS PRN HOLLI Lion      white petrolatum-mineral oil   Topical TID PRN HOLLI iLon         Counseling / Coordination of Care: Total floor / unit time spent today 15 minutes. Greater than 50% of total time was spent with the patient and / or family counseling and / or somewhat receptive to supportive listening and teaching positive coping skills to deal with symptom mangement.     Patient's Rights, confidentiality and exceptions to confidentiality, use of automated medical record, Behavioral Health Services staff access to medical record, and consent to treatment reviewed.    This note has been dictated and hence there may be problems with punctuation, spelling and formatting and if anyone has any concerns please address them to Dr. Mora.  This note is not shared with patient due to potential for making patient's condition worse by knowing the content of the note.    RODRIGO Flores

## 2024-11-18 NOTE — PLAN OF CARE
Problem: Ineffective Coping  Goal: Identifies ineffective coping skills  Outcome: Progressing  Goal: Identifies healthy coping skills  Outcome: Progressing  Goal: Demonstrates healthy coping skills  Outcome: Progressing  Goal: Participates in unit activities  Description: Interventions:  - Provide therapeutic environment   - Provide required programming   - Redirect inappropriate behaviors   Outcome: Progressing   Alberto continues to make progress towards the goals by attending groups and actively engaging.

## 2024-11-19 LAB
BASOPHILS # BLD AUTO: 0.05 THOUSANDS/ÂΜL (ref 0–0.1)
BASOPHILS NFR BLD AUTO: 1 % (ref 0–1)
CLOZAPINE SERPL-MCNC: 288 NG/ML (ref 350–900)
CRP SERPL QL: 11.2 MG/L
EOSINOPHIL # BLD AUTO: 0.44 THOUSAND/ÂΜL (ref 0–0.61)
EOSINOPHIL NFR BLD AUTO: 4 % (ref 0–6)
ERYTHROCYTE [DISTWIDTH] IN BLOOD BY AUTOMATED COUNT: 14.2 % (ref 11.6–15.1)
HCT VFR BLD AUTO: 42.5 % (ref 36.5–49.3)
HGB BLD-MCNC: 12.6 G/DL (ref 12–17)
IMM GRANULOCYTES # BLD AUTO: 0.02 THOUSAND/UL (ref 0–0.2)
IMM GRANULOCYTES NFR BLD AUTO: 0 % (ref 0–2)
LYMPHOCYTES # BLD AUTO: 4.06 THOUSANDS/ÂΜL (ref 0.6–4.47)
LYMPHOCYTES NFR BLD AUTO: 39 % (ref 14–44)
MCH RBC QN AUTO: 23.2 PG (ref 26.8–34.3)
MCHC RBC AUTO-ENTMCNC: 29.6 G/DL (ref 31.4–37.4)
MCV RBC AUTO: 78 FL (ref 82–98)
MONOCYTES # BLD AUTO: 0.79 THOUSAND/ÂΜL (ref 0.17–1.22)
MONOCYTES NFR BLD AUTO: 8 % (ref 4–12)
NEUTROPHILS # BLD AUTO: 4.94 THOUSANDS/ÂΜL (ref 1.85–7.62)
NEUTS SEG NFR BLD AUTO: 48 % (ref 43–75)
NRBC BLD AUTO-RTO: 0 /100 WBCS
PLATELET # BLD AUTO: 253 THOUSANDS/UL (ref 149–390)
PMV BLD AUTO: 9.9 FL (ref 8.9–12.7)
RBC # BLD AUTO: 5.42 MILLION/UL (ref 3.88–5.62)
WBC # BLD AUTO: 10.3 THOUSAND/UL (ref 4.31–10.16)

## 2024-11-19 PROCEDURE — 99232 SBSQ HOSP IP/OBS MODERATE 35: CPT | Performed by: PSYCHIATRY & NEUROLOGY

## 2024-11-19 PROCEDURE — 80159 DRUG ASSAY CLOZAPINE: CPT | Performed by: PSYCHIATRY & NEUROLOGY

## 2024-11-19 PROCEDURE — 86140 C-REACTIVE PROTEIN: CPT

## 2024-11-19 PROCEDURE — 85025 COMPLETE CBC W/AUTO DIFF WBC: CPT

## 2024-11-19 RX ADMIN — DOCUSATE SODIUM 100 MG: 100 CAPSULE, LIQUID FILLED ORAL at 17:23

## 2024-11-19 RX ADMIN — GLYCOPYRROLATE 1 MG: 1 TABLET ORAL at 13:00

## 2024-11-19 RX ADMIN — GLYCOPYRROLATE 2 MG: 1 TABLET ORAL at 21:14

## 2024-11-19 RX ADMIN — AMLODIPINE BESYLATE 5 MG: 5 TABLET ORAL at 08:59

## 2024-11-19 RX ADMIN — NICOTINE POLACRILEX 2 MG: 2 GUM, CHEWING ORAL at 17:46

## 2024-11-19 RX ADMIN — GLYCOPYRROLATE 1 MG: 1 TABLET ORAL at 08:59

## 2024-11-19 RX ADMIN — PROPRANOLOL HYDROCHLORIDE 10 MG: 10 TABLET ORAL at 21:13

## 2024-11-19 RX ADMIN — ARIPIPRAZOLE 30 MG: 15 TABLET ORAL at 08:59

## 2024-11-19 RX ADMIN — ATROPINE SULFATE 1 DROP: 10 SOLUTION/ DROPS OPHTHALMIC at 21:13

## 2024-11-19 RX ADMIN — NICOTINE POLACRILEX 2 MG: 2 GUM, CHEWING ORAL at 08:59

## 2024-11-19 RX ADMIN — NICOTINE POLACRILEX 2 MG: 2 GUM, CHEWING ORAL at 12:57

## 2024-11-19 RX ADMIN — MULTIPLE VITAMINS W/ MINERALS TAB 1 TABLET: TAB ORAL at 08:59

## 2024-11-19 RX ADMIN — PROPRANOLOL HYDROCHLORIDE 10 MG: 10 TABLET ORAL at 08:59

## 2024-11-19 RX ADMIN — NICOTINE POLACRILEX 2 MG: 2 GUM, CHEWING ORAL at 21:13

## 2024-11-19 RX ADMIN — LORATADINE 10 MG: 10 TABLET ORAL at 08:59

## 2024-11-19 RX ADMIN — CLOZAPINE 225 MG: 25 TABLET ORAL at 21:13

## 2024-11-19 RX ADMIN — SENNOSIDES AND DOCUSATE SODIUM 2 TABLET: 50; 8.6 TABLET ORAL at 21:13

## 2024-11-19 RX ADMIN — MELATONIN TAB 3 MG 9 MG: 3 TAB at 21:13

## 2024-11-19 RX ADMIN — ESCITALOPRAM OXALATE 20 MG: 10 TABLET, FILM COATED ORAL at 08:59

## 2024-11-19 RX ADMIN — PANTOPRAZOLE SODIUM 20 MG: 20 TABLET, DELAYED RELEASE ORAL at 08:59

## 2024-11-19 RX ADMIN — DOCUSATE SODIUM 100 MG: 100 CAPSULE, LIQUID FILLED ORAL at 08:59

## 2024-11-19 RX ADMIN — HYDROCHLOROTHIAZIDE 12.5 MG: 12.5 TABLET ORAL at 08:59

## 2024-11-19 NOTE — PROGRESS NOTES
11/19/24 6530   Team Meeting   Meeting Type Daily Rounds   Team Members Present   Team Members Present Physician;Nurse;;Other (Discipline and Name)   Physician Team Member Holter, Thomas   Nursing Team Member Claudia   Social Work Team Member Jose J ORTEGA   Other (Discipline and Name) Serg Grace PharmD   Patient/Family Present   Patient Present No   Patient's Family Present No     Groups Participation  8/9.   Patient's compliant with medications. He's appropriate and engaged in his treatment.[facility] ACT referral submitted and need [facility] funding. He's social with his peers.

## 2024-11-19 NOTE — ASSESSMENT & PLAN NOTE
Reviewed on 11/19/24  Follows with medical  Continue famotidine 20 mg tablet BID per medical team   Continue glycopyrrolate 1 mg tablet BID AM and afternoon per medical team  Continue glycopyrrolate 2 mg tablet before bed per medical team  Continue pantoprazole EC 20 mg tablet every morning per medical team  No associated orders from this encounter found during lookback period of 72 hours.

## 2024-11-19 NOTE — ASSESSMENT & PLAN NOTE
Reviewed on 11/19/24  Follows with medical  Continue senna-docusate sodium dose per medical team.   No associated orders from this encounter found during lookback period of 72 hours.

## 2024-11-19 NOTE — ASSESSMENT & PLAN NOTE
Reviewed on 11/19/24  Follows with medical  No associated orders from this encounter found during lookback period of 72 hours.

## 2024-11-19 NOTE — ASSESSMENT & PLAN NOTE
Reviewed on 11/19/24  Ongoing by medical  No associated orders from this encounter found during lookback period of 72 hours.

## 2024-11-19 NOTE — ASSESSMENT & PLAN NOTE
"Reviewed on 11/19/24    Patient reports feelings of depression and anxiety, racing thoughts, hopelessness, low-self esteem, and low energy. He states he slept \"okay\" last night- it took him a few hours to fall asleep and he is feeling tired today. He states he is eating well. He states his mood is \"depressed\". He states his depression is a 8/10 this morning. He states he is depressed and anxious due to the voices in his head constantly telling him they are going to kill him and his family and that he is going to hell. He states the voices are random people and not his own voice. He states he has been hearing this voices for over 2 years. He states working out helps him cope with the voices. He denies any visual hallucinations. He reports persecutory delusions in regards to the voices he is hearing but denies grandiose, somatic, or bizarre delusions. He reports passive death wishes \"sometimes\" but denies thoughts of harm to self and others, SI, or HI.     Continue Clozapine 225 mg qhs for psychosis -- to consider further careful titration for Sxs depending on response  Continue to obtain CBC with diff, CRP, and CK (weekly on Tues), and a Clozapine level and BNP (every 2 weeks on Tues)- labs taken today 11/19 and are still pending     Continue Abilify 30 mg once daily for psychosis  Continue Escitalopram 20 mg once daily for depression and anxiety  Continue Senna-Docusate sodium one 50 mg tablet daily before bed for constipation  - decreased from two tablets daily on 11/3 due to reported loose stools  Continue propanolol 10 mg twice daily for anxiety   Continue amlodipine 5 mg PO daily     Pt remains on dual antipsychotic Tx due to failure on monotherapy      ACT team referral was made  No associated orders from this encounter found during lookback period of 72 hours.    "

## 2024-11-19 NOTE — NURSING NOTE
Received pt in bed at change of shift with eyes closed; chest movement noted.  Continues the same thus this far as per q 15 min room checks.   Will continue to monitor behavior, sleeping pattern and any medical issues that may arise.    0557:  Sleeping 7+ hrs thus this far    Lab obtained and delivered to our lab

## 2024-11-19 NOTE — PROGRESS NOTES
"Progress Note - Behavioral Health   Name: Alberto Berumen 29 y.o. male I MRN: [id number]  Unit/Bed#: [facility] 112-02 I Date of Admission: 3/29/2024   Date of Service: 11/19/2024 I Hospital Day: 235     Assessment & Plan  Schizoaffective disorder, bipolar type (HCC)  Reviewed on 11/19/24    Patient reports feelings of depression and anxiety, racing thoughts, hopelessness, low-self esteem, and low energy. He states he slept \"okay\" last night- it took him a few hours to fall asleep and he is feeling tired today. He states he is eating well. He states his mood is \"depressed\". He states his depression is a 8/10 this morning. He states he is depressed and anxious due to the voices in his head constantly telling him they are going to kill him and his family and that he is going to hell. He states the voices are random people and not his own voice. He states he has been hearing this voices for over 2 years. He states working out helps him cope with the voices. He denies any visual hallucinations. He reports persecutory delusions in regards to the voices he is hearing but denies grandiose, somatic, or bizarre delusions. He reports passive death wishes \"sometimes\" but denies thoughts of harm to self and others, SI, or HI.     Continue Clozapine 225 mg qhs for psychosis -- to consider further careful titration for Sxs depending on response  Continue to obtain CBC with diff, CRP, and CK (weekly on Tues), and a Clozapine level and BNP (every 2 weeks on Tues)- labs taken today 11/19 and are still pending     Continue Abilify 30 mg once daily for psychosis  Continue Escitalopram 20 mg once daily for depression and anxiety  Continue Senna-Docusate sodium one 50 mg tablet daily before bed for constipation  - decreased from two tablets daily on 11/3 due to reported loose stools  Continue propanolol 10 mg twice daily for anxiety   Continue amlodipine 5 mg PO daily     Pt remains on dual antipsychotic Tx due to failure on monotherapy    "   ACT team referral was made  No associated orders from this encounter found during lookback period of 72 hours.    Chronic idiopathic constipation  Reviewed on 11/19/24  Follows with medical  Continue senna-docusate sodium dose per medical team.   No associated orders from this encounter found during lookback period of 72 hours.  GERD (gastroesophageal reflux disease)  Reviewed on 11/19/24  Follows with medical  Continue famotidine 20 mg tablet BID per medical team   Continue glycopyrrolate 1 mg tablet BID AM and afternoon per medical team  Continue glycopyrrolate 2 mg tablet before bed per medical team  Continue pantoprazole EC 20 mg tablet every morning per medical team  No associated orders from this encounter found during lookback period of 72 hours.  Medical clearance for psychiatric admission  Reviewed on 11/19/24  Ongoing by medical  No associated orders from this encounter found during lookback period of 72 hours.    Tobacco abuse  Reviewed on 11/19/24  Continue to encourage cessation upon discharge  No associated orders from this encounter found during lookback period of 72 hours.  Elevated hemoglobin A1c  Reviewed on 11/19/24  Follows with medical  No associated orders from this encounter found during lookback period of 72 hours.  Class 2 obesity in adult  Reviewed on 11/19/24  Follows with medical  No associated orders from this encounter found during lookback period of 72 hours.    Primary hypertension  Reviewed on 11/19/24  Follows with medical  No associated orders from this encounter found during lookback period of 72 hours.    Psychiatry Progress Note [facility]    Progress towards goals: Progressing    Review of systems: Unremarkable    Psychiatric Diagnosis: Schizoaffective disorder, bipolar type    Assessment  Overall Status:  Improving  Certification Statement: The patient will continue to require additional inpatient hospital stay due to Schizoaffective disorder bipolar  "type and related symptoms       Medications: as above  Side effects from treatment: Denied  Medication changes: as above  Medication education: Risks side effects benefits and precautions of medications discussed with patient and they did verbalize an understanding about risks for metabolic syndrome from being on neuroleptics and tardive dyskinesia etc.  All medications reviewed and I recommend that they be continued for symptom management  Understanding of medications: Risks side effects benefits and precautions of medications discussed with patient and they did verbalize an understanding about risks for metabolic syndrome from being on neuroleptics and tardive dyskinesia etc., and patient appeared to have understanding.  Justification for dual anti-psychotics: Due to failure on monotherapy    Non-pharmacological treatments  Continue with individual, group, milieu and occupational therapy using recovery principles and psycho-education about accepting illness and the need for treatment.  Behavioral health checks every 7 minutes    Safety  Safety and communication plan established to target dynamic risk factors discussed above.    Discharge Plan   Discharge to live with aunt once [facility] approves funding an ACT team      Interval Progress: Per treatment team, patient is compliant with meals and medications. Team notes patient is pleasant and social on unit. Patient used punching bag yesterday to help cope with the voices he is hearing. Patient requested tylenol at 1927 yesterday for headache and it was effective. Patient did not require any PRN psychotropic medications yesterday or this morning. He attended 8/9 groups yesterday.   Today, patient evaluated by this writer and Dr. Rosario in the hallway. He states his mood is \"depressed\" due to the constant threatening voices he hears. He states his depression is an 8/10 this morning. He states the voices are constant and he has been hearing them for the past 2 years. He " "states they are telling him they are going to kill him and his family and that he is going to hell. He denies any visual hallucinations. He states he \"works out\" to cope with the voices and used the punching bag yesterday. He reports persecutory delusions in regards to the voices he is hearing but denies grandiose, somatic, or bizarre delusions. Patient reports feelings of hopelessness, low self-esteem, low energy, and racing thoughts. He states he slept \"okay\" as it took him a while to fall asleep last night and he is feeling tired today. He reports passive death wishes \"sometimes\" but denies thoughts to harm self or others, SI, and HI. Patient states he had a headache yesterday and requested tylenol which was effective and denies any headache today. Patient denies any adverse medication effects.     Acceptance by patient: Accepting of medications and treatments  Hopefulness in recovery: Living with his aunt   Involved in reintegration process: Patient communication with sister and aunt on phone.   Trusting in relationship with psychiatrist: Present  Sleep: Fair  Appetite: Good  Compliance with Medications: Compliant  Group attendance: 8/9 groups  Significant events: None in the past 24 hours    Mental Status Exam  Appearance: age appropriate, casually dressed, looks stated age, disheveled  Behavior: cooperative, calm, fair  eye contact  Speech: normal rate, normal pitch, low volume  Mood: \"depressed\"  Affect: constricted, mood-congruent  Thought Process: organized, logical, coherent, goal directed  Thought Content: mild paranoia regarding threatening voices  Perceptual Disturbances: auditory hallucinations of voices telling him they are going to kill him and his family. Denies visual hallucinations when asked. Does not appear to be responding to internal stimuli  Sensorium: alert and oriented to person, place, time and situation  Cognition: recent and remote memory grossly intact  Consciousness: alert and " awake  Attention: attention span and concentration are age appropriate  Intellect: appears to be of average intelligence  Insight: fair  Judgement: fair  Motor Activity: no abnormal movements     Vitals  Temp:  [97.5 °F (36.4 °C)] 97.5 °F (36.4 °C)  HR:  [] 88  Resp:  [18] 18  BP: (111-150)/(63-80) 130/80  SpO2:  [97 %-98 %] 98 %  No intake or output data in the 24 hours ending 11/19/24 0930    Lab Results: All Labs For Current Hospital Admission Reviewed  Results from last 7 days   Lab Units 11/19/24  0526   WBC Thousand/uL 10.30*   RBC Million/uL 5.42   HEMOGLOBIN g/dL 12.6   HEMATOCRIT % 42.5   MCV fL 78*   PLATELETS Thousands/uL 253   TOTAL NEUT ABS Thousands/µL 4.94   CLOZAPINE LVL ng/mL 288*       Current Facility-Administered Medications   Medication Dose Route Frequency Provider Last Rate    acetaminophen  650 mg Oral Q4H PRN Jordan C Holter, DO      acetaminophen  650 mg Oral Q6H PRN HOLLI Lion      aluminum-magnesium hydroxide-simethicone  30 mL Oral Q4H PRN Jordan C Holter, DO      amLODIPine  5 mg Oral Daily HOLLI Lion      ARIPiprazole  30 mg Oral Daily Bora Rosario MD      Artificial Tears  1 drop Both Eyes Q3H PRN Jordan C Holter, DO      atropine  1 drop Sublingual  Harjeet Torres, DO      haloperidol lactate  2.5 mg Intramuscular Q4H PRN Max 4/day HOLLI Lion      And    LORazepam  1 mg Intramuscular Q4H PRN Max 4/day HOLLI Lion      And    benztropine  0.5 mg Intramuscular Q4H PRN Max 4/day HOLLI Lion      benztropine  1 mg Intramuscular Q4H PRN Max 6/day Jordan C Holter, DO      haloperidol lactate  5 mg Intramuscular Q4H PRN Max 4/day HOLLI Lion      And    LORazepam  2 mg Intramuscular Q4H PRN Max 4/day HOLLI Lion      And    benztropine  1 mg Intramuscular Q4H PRN Max 4/day HOLLI Lion      benztropine  1 mg Oral Q4H PRN Max 6/day HOLLI Lion      benztropine  1 mg Oral Q4H PRN Max 6/day Jordan C Holter, DO       bisacodyl  10 mg Rectal Daily PRN HOLLI Lion      calcium carbonate  500 mg Oral BID PRN HOLLI Monge      cloZAPine  225 mg Oral HS Hardik So MD      hydrOXYzine HCL  50 mg Oral Q6H PRN Max 4/day Ion FISCHER Holter, DO      Or    diphenhydrAMINE  50 mg Intramuscular Q6H PRN Ion FISCHER Holter, DO      hydrOXYzine HCL  50 mg Oral Q6H PRN Max 4/day HOLLI Lion      Or    diphenhydrAMINE  50 mg Intramuscular Q6H PRN HOLLI Lion      diphenhydrAMINE-zinc acetate   Topical BID PRN HOLLI Lion      docusate sodium  100 mg Oral BID Jourdan Dickens, DO      escitalopram  20 mg Oral Daily HOLLI Lion      famotidine  20 mg Oral BID PRN HOLLI Monge      fluticasone  1 spray Each Nare Daily PRN HOLLI Monge      glycopyrrolate  1 mg Oral BID (AM & Afternoon) Bora Rosario MD      glycopyrrolate  2 mg Oral HS Bora Rosario MD      haloperidol  1 mg Oral Q6H PRN HOLLI Lion      haloperidol  2.5 mg Oral Q4H PRN Max 4/day HOLLI Lion      haloperidol  5 mg Oral Q4H PRN Max 4/day HOLLI Lion      hydroCHLOROthiazide  12.5 mg Oral Daily HOLLI Galvan      hydrocortisone   Topical 4x Daily PRN HOLLI Lion      hydrOXYzine HCL  100 mg Oral Q6H PRN Max 4/day Ion Dupontter, DO      Or    LORazepam  2 mg Intramuscular Q6H PRN Ion FISCHER Holter, DO      hydrOXYzine HCL  100 mg Oral Q6H PRN Max 4/day HOLLI Lion      Or    LORazepam  2 mg Intramuscular Q6H PRN HOLLI Lion      hydrOXYzine HCL  25 mg Oral Q6H PRN Max 4/day Ion FISCHER Holter, DO      ibuprofen  600 mg Oral Q8H PRN HOLLI Lion      influenza vaccine  0.5 mL Intramuscular Once Bora Rosario MD      loratadine  10 mg Oral Daily Brina Guillen MD      magnesium hydroxide  30 mL Oral Once Jourdan Dickens, DO      melatonin  3 mg Oral HS PRN Bora Rosario MD      melatonin  9 mg Oral HS Bora Rosario MD      methocarbamol   500 mg Oral Q6H PRN HOLLI Lion      multivitamin-minerals  1 tablet Oral Daily Eileen CherryHOLLI Jimenez      nicotine polacrilex  2 mg Oral Q4H PRN Bora Rosario MD      OLANZapine  5 mg Oral Q4H PRN Max 3/day [facility] Holter, DO      Or    OLANZapine  2.5 mg Intramuscular Q4H PRN Max 3/day [facility] Holter, DO      OLANZapine  5 mg Oral Q3H PRN Max 3/day [facility] Holter, DO      Or    OLANZapine  5 mg Intramuscular Q3H PRN Max 3/day [facility] Holter, DO      OLANZapine  2.5 mg Oral Q4H PRN Max 6/day [facility] Holter, DO      ondansetron  4 mg Oral Q6H PRN HOLLI Lion      [facility]antoprazole  [facility]      polyethylene glycol  17 g Oral Daily PRN Sofi Yoo, HOLLI      polyethylene glycol  17 g Oral Daily Jourdan Dickens, DO      propranolol  10 mg Oral Q12H KAYLIE HOLLI Lion      senna-docusate sodium  1 tablet [facility]      senna-docusate sodium  2 tablet Oral HS Jourdan Dickens, DO      traZODone  50 mg Oral HS PRN HOLLI Lion      white petrolatum-mineral oil   Topical TID PRN HOLLI Lion         Counseling / Coordination of Care: Total floor / unit time spent today 15 minutes. Greater than 50% of total time was spent with the patient and / or family counseling and / or somewhat receptive to supportive listening and teaching positive coping skills to deal with symptom mangement.     Patient's Rights, confidentiality and exceptions to confidentiality, use of automated medical record, Behavioral Health Services staff access to medical record, and consent to treatment reviewed.    This note has been dictated and hence there may be problems with punctuation, spelling and formatting and if anyone has any concerns please address them to Dr. Mora.  This note is not shared with patient due to potential for making patient's condition worse by knowing the content of the note.    VERONICA FloresS

## 2024-11-19 NOTE — PLAN OF CARE
Problem: Alteration in Thoughts and Perception  Goal: Treatment Goal: Gain control of psychotic behaviors/thinking, reduce/eliminate presenting symptoms and demonstrate improved reality functioning upon discharge  Outcome: Progressing  Goal: Refrain from acting on delusional thinking/internal stimuli  Description: Interventions:  - Monitor patient closely, per order   - Utilize least restrictive measures   - Set reasonable limits, give positive feedback for acceptable   - Administer medications as ordered and monitor of potential side effects  Outcome: Progressing  Goal: Agree to be compliant with medication regime, as prescribed and report medication side effects  Description: Interventions:  - Offer appropriate PRN medication and supervise ingestion; conduct AIMS, as needed   Outcome: Progressing  Goal: Recognize dysfunctional thoughts, communicate reality-based thoughts at the time of discharge  Description: Interventions:  - Provide medication and psycho-education to assist patient in compliance and developing insight into his/her illness   Outcome: Progressing  Goal: Complete daily ADLs, including personal hygiene independently, as able  Description: Interventions:  - Observe, teach, and assist patient with ADLS  - Monitor and promote a balance of rest/activity, with adequate nutrition and elimination   Outcome: Progressing     Problem: Ineffective Coping  Goal: Identifies ineffective coping skills  Outcome: Progressing  Goal: Participates in unit activities  Description: Interventions:  - Provide therapeutic environment   - Provide required programming   - Redirect inappropriate behaviors   Outcome: Progressing  Goal: Patient/Family participate in treatment and DC plans  Description: Interventions:  - Provide therapeutic environment  Outcome: Progressing  Goal: Patient/Family verbalizes awareness of resources  Outcome: Progressing     Problem: Depression  Goal: Treatment Goal: Demonstrate behavioral control  of depressive symptoms, verbalize feelings of improved mood/affect, and adopt new coping skills prior to discharge  Outcome: Progressing  Goal: Verbalize thoughts and feelings  Description: Interventions:  - Assess and re-assess patient's level of risk   - Engage patient in 1:1 interactions, daily, for a minimum of 15 minutes   - Encourage patient to express feelings, fears, frustrations, hopes   Outcome: Progressing  Goal: Refrain from harming self  Description: Interventions:  - Monitor patient closely, per order   - Supervise medication ingestion, monitor effects and side effects   Outcome: Progressing  Goal: Refrain from isolation  Description: Interventions:  - Develop a trusting relationship   - Encourage socialization   Outcome: Progressing  Goal: Refrain from self-neglect  Outcome: Progressing  Goal: Attend and participate in unit activities, including therapeutic, recreational, and educational groups  Description: Interventions:  - Provide therapeutic and educational activities daily, encourage attendance and participation, and document same in the medical record   Outcome: Progressing     Problem: Anxiety  Goal: Anxiety is at manageable level  Description: Interventions:  - Assess and monitor patient's anxiety level.   - Monitor for signs and symptoms (heart palpitations, chest pain, shortness of breath, headaches, nausea, feeling jumpy, restlessness, irritable, apprehensive).   - Collaborate with interdisciplinary team and initiate plan and interventions as ordered.  - [place] patient to unit/surroundings  - Explain treatment plan  - Encourage participation in care  - Encourage verbalization of concerns/fears  - Identify coping mechanisms  - Assist in developing anxiety-reducing skills  - Administer/offer alternative therapies  - Limit or eliminate stimulants  Outcome: Progressing     Problem: Alteration in Orientation  Goal: Treatment Goal: Demonstrate a reduction of confusion and improved orientation to  person, place, time and/or situation upon discharge, according to optimum baseline/ability  Outcome: Progressing  Goal: Interact with staff daily  Description: Interventions:  - Assess and re-assess patient's level of orientation  - Engage patient in 1 on 1 interactions, daily, for a minimum of 15 minutes   - Establish rapport/trust with patient   Outcome: Progressing  Goal: Express concerns related to confused thinking related to:  Description: Interventions:  - Encourage patient to express feelings, fears, frustrations, hopes  - Assign consistent caregivers   - [place]/re-orient patient as needed  - Allow comfort items, as appropriate  - Provide visual cues, signs, etc.   Outcome: Progressing  Goal: Allow medical examinations, as recommended  Description: Interventions:  - Provide physical/neurological exams and/or referrals, per provider   Outcome: Progressing  Goal: Cooperate with recommended testing/procedures  Description: Interventions:  - Determine need for ancillary testing  - Observe for mental status changes  - Implement falls/precaution protocol   Outcome: Progressing  Goal: Complete daily ADLs, including personal hygiene independently, as able  Description: Interventions:  - Observe, teach, and assist patient with ADLS  Outcome: Progressing     Problem: DISCHARGE PLANNING - CARE MANAGEMENT  Goal: Discharge to post-acute care or home with appropriate resources  Description: INTERVENTIONS:  - Conduct assessment to determine patient/family and health care team treatment goals, and need for post-acute services based on payer coverage, community resources, and patient preferences, and barriers to discharge  - Address psychosocial, clinical, and financial barriers to discharge as identified in assessment in conjunction with the patient/family and health care team  - Arrange appropriate level of post-acute services according to patient's   needs and preference and payer coverage in collaboration with the  physician and health care team  - Communicate with and update the patient/family, physician, and health care team regarding progress on the discharge plan  - Arrange appropriate transportation to post-acute venues  Outcome: Progressing      W Plasty Text: The lesion was extirpated to the level of the fat with a #15 scalpel blade.  Given the location of the defect, shape of the defect and the proximity to free margins a W-plasty was deemed most appropriate for repair.  Using a sterile surgical marker, the appropriate transposition arms of the W-plasty were drawn incorporating the defect and placing the expected incisions within the relaxed skin tension lines where possible.    The area thus outlined was incised deep to adipose tissue with a #15 scalpel blade.  The skin margins were undermined to an appropriate distance in all directions utilizing iris scissors.  The opposing transposition arms were then transposed into place in opposite direction and anchored with interrupted buried subcutaneous sutures.

## 2024-11-19 NOTE — NURSING NOTE
"Alberto was calm and cooperative during assessment. He reports AH of a voice stating it will, \"harm him and his family,\" but denies any SI or SH feelings and states he is able to walk and talk to peers to distract himself from this IS. He denies SI, SH, HI, and VH. He reports mild anxiety and depression. He was social with peers in the milieu and attended lunch and dinner. He was medication compliant and asked for nicorette gum. He attended some group programming. He denies new concerns at this time.   "

## 2024-11-19 NOTE — ASSESSMENT & PLAN NOTE
Reviewed on 11/19/24  Continue to encourage cessation upon discharge  No associated orders from this encounter found during lookback period of 72 hours.

## 2024-11-20 PROCEDURE — 99232 SBSQ HOSP IP/OBS MODERATE 35: CPT | Performed by: PSYCHIATRY & NEUROLOGY

## 2024-11-20 RX ADMIN — ESCITALOPRAM OXALATE 20 MG: 10 TABLET, FILM COATED ORAL at 08:50

## 2024-11-20 RX ADMIN — GLYCOPYRROLATE 1 MG: 1 TABLET ORAL at 15:07

## 2024-11-20 RX ADMIN — ATROPINE SULFATE 1 DROP: 10 SOLUTION/ DROPS OPHTHALMIC at 21:12

## 2024-11-20 RX ADMIN — NICOTINE POLACRILEX 2 MG: 2 GUM, CHEWING ORAL at 12:47

## 2024-11-20 RX ADMIN — GLYCOPYRROLATE 1 MG: 1 TABLET ORAL at 08:50

## 2024-11-20 RX ADMIN — NICOTINE POLACRILEX 2 MG: 2 GUM, CHEWING ORAL at 08:50

## 2024-11-20 RX ADMIN — ARIPIPRAZOLE 30 MG: 15 TABLET ORAL at 08:50

## 2024-11-20 RX ADMIN — LORATADINE 10 MG: 10 TABLET ORAL at 08:50

## 2024-11-20 RX ADMIN — MELATONIN TAB 3 MG 9 MG: 3 TAB at 21:13

## 2024-11-20 RX ADMIN — AMLODIPINE BESYLATE 5 MG: 5 TABLET ORAL at 08:50

## 2024-11-20 RX ADMIN — NICOTINE POLACRILEX 2 MG: 2 GUM, CHEWING ORAL at 17:17

## 2024-11-20 RX ADMIN — CLOZAPINE 225 MG: 25 TABLET ORAL at 21:13

## 2024-11-20 RX ADMIN — PANTOPRAZOLE SODIUM 20 MG: 20 TABLET, DELAYED RELEASE ORAL at 08:50

## 2024-11-20 RX ADMIN — DOCUSATE SODIUM 100 MG: 100 CAPSULE, LIQUID FILLED ORAL at 08:50

## 2024-11-20 RX ADMIN — PROPRANOLOL HYDROCHLORIDE 10 MG: 10 TABLET ORAL at 08:50

## 2024-11-20 RX ADMIN — HYDROCHLOROTHIAZIDE 12.5 MG: 12.5 TABLET ORAL at 08:50

## 2024-11-20 RX ADMIN — PROPRANOLOL HYDROCHLORIDE 10 MG: 10 TABLET ORAL at 21:13

## 2024-11-20 RX ADMIN — DOCUSATE SODIUM 100 MG: 100 CAPSULE, LIQUID FILLED ORAL at 17:17

## 2024-11-20 RX ADMIN — NICOTINE POLACRILEX 2 MG: 2 GUM, CHEWING ORAL at 21:13

## 2024-11-20 RX ADMIN — MULTIPLE VITAMINS W/ MINERALS TAB 1 TABLET: TAB ORAL at 08:50

## 2024-11-20 RX ADMIN — GLYCOPYRROLATE 2 MG: 1 TABLET ORAL at 21:13

## 2024-11-20 RX ADMIN — SENNOSIDES AND DOCUSATE SODIUM 2 TABLET: 50; 8.6 TABLET ORAL at 21:13

## 2024-11-20 NOTE — PROGRESS NOTES
"Progress Note - Behavioral Health   Name: Alberto Berumen 29 y.o. male I MRN: [id number]  Unit/Bed#: [facility] 112-02 I Date of Admission: 3/29/2024   Date of Service: 11/20/2024 I Hospital Day: 236     Assessment & Plan  Schizoaffective disorder, bipolar type (HCC)  Reviewed on 11/20/24    Patient reports feelings of depression and anxiety, racing thoughts, hopelessness, low-self esteem, and low energy. He states he slept \"well\" last night and slept in until around 9 am. He states he is still feeling tired this morning. He states he is eating well and has a good appetite. He states his mood is \"depressed\". He states his depression is a 8/10 this morning. He states he is depressed and anxious due to the voices in his head constantly telling him they are going to kill him and his family and that he is going to hell. He states his anxiety is an 8/10 this morning as well. He states the voices are random people and not his own voice. He states he has been hearing this voices constantly for over 2 years. He states the voices have not said anything new recently. He states \"working out\" and \"walking and talking\" with peers helps him cope with the voices. He denies any visual hallucinations. He reports persecutory delusions in regards to the voices he is hearing but states he does not think anyone here on the unit is going to hurt him. He denies grandiose, somatic, or bizarre delusions. He reports passive death wishes \"sometimes\" but states he does not have them currently. He denies thoughts of harm to self and others, SI, or HI.     Continue Clozapine 225 mg daily at bedtime for psychosis -- to consider further careful titration for Sxs depending on response  Continue to obtain CBC with diff, CRP, and CK (weekly on Tues), and a Clozapine level and BNP (every 2 weeks on Tues)- labs taken yesterday 11/19 and are still pending     Continue Abilify 30 mg once daily for psychosis  Continue Escitalopram 20 mg once daily for " depression and anxiety  Continue Senna-Docusate sodium one 50 mg tablet daily before bed for constipation  - decreased from two tablets daily on 11/3 due to reported loose stools  Continue propanolol 10 mg twice daily for anxiety   Continue amlodipine 5 mg PO daily     Pt remains on dual antipsychotic Tx due to failure on monotherapy      ACT team referral was made  No associated orders from this encounter found during lookback period of 72 hours.    Chronic idiopathic constipation  Reviewed on 11/20/24  Follows with medical  Continue senna-docusate sodium dose per medical team.   No associated orders from this encounter found during lookback period of 72 hours.  GERD (gastroesophageal reflux disease)  Reviewed on 11/20/24  Follows with medical  Continue famotidine 20 mg tablet BID per medical team   Continue glycopyrrolate 1 mg tablet BID AM and afternoon per medical team  Continue glycopyrrolate 2 mg tablet before bed per medical team  Continue pantoprazole EC 20 mg tablet every morning per medical team  No associated orders from this encounter found during lookback period of 72 hours.  Medical clearance for psychiatric admission  Reviewed on 11/20/24  Ongoing by medical  No associated orders from this encounter found during lookback period of 72 hours.    Tobacco abuse  Reviewed on 11/20/24  Continue to encourage cessation upon discharge  No associated orders from this encounter found during lookback period of 72 hours.  Elevated hemoglobin A1c  Reviewed on 11/20/24  Follows with medical  No associated orders from this encounter found during lookback period of 72 hours.  Class 2 obesity in adult  Reviewed on 11/20/24  Follows with medical  No associated orders from this encounter found during lookback period of 72 hours.    Primary hypertension  Reviewed on 11/20/24  Follows with medical  No associated orders from this encounter found during lookback period of 72 hours.    Psychiatry Progress Note [facility]  [facility]    Progress towards goals: Progressing    Review of systems: Unremarkable    Psychiatric Diagnosis: Schizoaffective disorder, bipolar type    Assessment  Overall Status:  Status quo- continues to feel anxious and depressed regarding threatening voices  Certification Statement: The patient will continue to require additional inpatient hospital stay due to Schizoaffective disorder and related symptoms       Medications: as above  Side effects from treatment: Denied  Medication changes: as above  Medication education: Risks side effects benefits and precautions of medications discussed with patient and they did verbalize an understanding about risks for metabolic syndrome from being on neuroleptics and tardive dyskinesia etc.  All medications reviewed and I recommend that they be continued for symptom management  Understanding of medications: Risks side effects benefits and precautions of medications discussed with patient and they did verbalize an understanding about risks for metabolic syndrome from being on neuroleptics and tardive dyskinesia etc., and patient appeared to have understanding.  Justification for dual anti-psychotics: Due to failure on monotherapy    Non-pharmacological treatments  Continue with individual, group, milieu and occupational therapy using recovery principles and psycho-education about accepting illness and the need for treatment.  Behavioral health checks every 7 minutes    Safety  Safety and communication plan established to target dynamic risk factors discussed above.    Discharge Plan   Discharge to live with aunt once [facility] approves funding an ACT team    Interval Progress: Per treatment team, patient is compliant with meals and medications. He is visible, cooperative, pleasant and social with peers on unit. Patient did not require any PRN psychotropic medications yesterday or this morning. He attended 10/11 groups yesterday.   Today, patient evaluated by this  "writer and Dr. Rosario in the hallway. He states his mood is \"depressed\" due to the constant voices he hears telling him they are going to kill him and his family and that he is going to hell. He states his depression is a 8/10 this morning. He states the voices are constant and he has been hearing them for the past 2 years. He states they are random voices that he does not recognize and it is never his own voice. He states he knows he needs to continue to find ways to help cope with the voices. Currently he states \"working out\" and \"walking and talking\" with peers helps him cope with the voices. He denies visual hallucinations. He reports persecutory delusions in regards to the voices he is hearing but states he does not believe anyone here on the unit is going to hurt him. He denies grandiose, somatic, or bizarre delusions. Patient reports feelings of depression, anxiety, hopelessness, racing thoughts, low self-esteem, and low energy. He states his anxiety is an 8/10 this morning. He states he slept \"well\" last night and slept until around 9 am, however, he states he is still feeling tired this morning. He reports passive death wishes \"sometimes\" but states he does not have them currently. He denies thoughts of harm to self or others, SI, or HI. Patient denies any adverse medication effects.     Acceptance by patient: Accepting of medications and treatments  Hopefulness in recovery: Living with his aunt  Involved in reintegration process: Patient communication with his sister and aunt on phone  Trusting in relationship with psychiatrist: Present  Sleep: Good  Appetite: Good  Compliance with Medications: Compliant  Group attendance: 10/11 groups yesterday  Significant events: None in the past 24 hours    Mental Status Exam  Appearance: age appropriate, casually dressed, looks stated age, disheveled  Behavior: cooperative, calm, good  eye contact  Speech: normal rate, normal volume, normal pitch, not pressured  Mood: " "\"depressed\"  Affect: constricted, mood-congruent  Thought Process: organized, logical, coherent, goal directed  Thought Content: Mild paranoia regarding threatening voices  Perceptual Disturbances: auditory hallucinations of voices telling him they are going to kill him and his family. Denies visual hallucinations when asked. Does not appear to be responding to internal stimuli during interview   Sensorium: alert and oriented to person, place, time and situation  Cognition: recent and remote memory grossly intact  Consciousness: alert and awake  Attention: attention span and concentration are age appropriate  Intellect: appears to be of average intelligence  Insight: fair  Judgement: fair  Motor Activity: no abnormal movements     Vitals  Temp:  [97.5 °F (36.4 °C)-97.8 °F (36.6 °C)] 97.8 °F (36.6 °C)  HR:  [87-99] 87  Resp:  [18-19] 19  BP: (115-129)/(68-76) 127/76  SpO2:  [98 %] 98 %  No intake or output data in the 24 hours ending 11/20/24 1004    Lab Results: All Labs For Current Hospital Admission Reviewed  Results from last 7 days   Lab Units 11/19/24  0526   WBC Thousand/uL 10.30*   RBC Million/uL 5.42   HEMOGLOBIN g/dL 12.6   HEMATOCRIT % 42.5   MCV fL 78*   PLATELETS Thousands/uL 253   TOTAL NEUT ABS Thousands/µL 4.94   CLOZAPINE LVL ng/mL 288*       Current Facility-Administered Medications   Medication Dose Route Frequency Provider Last Rate    acetaminophen  650 mg Oral Q4H PRN Jordan C Holter, DO      acetaminophen  650 mg Oral Q6H PRN HOLLI Lion      aluminum-magnesium hydroxide-simethicone  30 mL Oral Q4H PRN Jordan C Holter, DO      amLODIPine  5 mg Oral Daily HOLLI Lion      ARIPiprazole  30 mg Oral Daily Bora Rosario MD      Artificial Tears  1 drop Both Eyes Q3H PRN Jordan C Holter, DO      atropine  1 drop Sublingual GAEL Torres DO      haloperidol lactate  2.5 mg Intramuscular Q4H PRN Max 4/day HOLLI Lion      And    LORazepam  1 mg Intramuscular Q4H PRN Max 4/day " HOLLI Lion      And    benztropine  0.5 mg Intramuscular Q4H PRN Max 4/day HOLLI Lion      benztropine  1 mg Intramuscular Q4H PRN Max 6/day Jordan C Holter, DO      haloperidol lactate  5 mg Intramuscular Q4H PRN Max 4/day HOLLI Lion      And    LORazepam  2 mg Intramuscular Q4H PRN Max 4/day HOLLI Lion      And    benztropine  1 mg Intramuscular Q4H PRN Max 4/day HOLLI Lion      benztropine  1 mg Oral Q4H PRN Max 6/day HOLLI Lion      benztropine  1 mg Oral Q4H PRN Max 6/day Jordan C Holter, DO      bisacodyl  10 mg Rectal Daily PRN HOLLI Lion      calcium carbonate  500 mg Oral BID PRN HOLLI Monge      cloZAPine  225 mg Oral HS Hardik So MD      hydrOXYzine HCL  50 mg Oral Q6H PRN Max 4/day Jordan C Holter, DO      Or    diphenhydrAMINE  50 mg Intramuscular Q6H PRN Jordan C Holter, DO      hydrOXYzine HCL  50 mg Oral Q6H PRN Max 4/day HOLLI Lion      Or    diphenhydrAMINE  50 mg Intramuscular Q6H PRN HOLLI Lion      diphenhydrAMINE-zinc acetate   Topical BID PRN HOLLI Lion      docusate sodium  100 mg Oral BID Jourdan Dickens, DO      escitalopram  20 mg Oral Daily HOLLI Lion      famotidine  20 mg Oral BID PRN HOLLI Monge      fluticasone  1 spray Each Nare Daily PRN HOLLI Monge      glycopyrrolate  1 mg Oral BID (AM & Afternoon) Bora Rosario MD      glycopyrrolate  2 mg Oral HS Bora Rosario MD      haloperidol  1 mg Oral Q6H PRN HOLLI Lion      haloperidol  2.5 mg Oral Q4H PRN Max 4/day HOLLI Lion      haloperidol  5 mg Oral Q4H PRN Max 4/day HOLLI Lion      hydroCHLOROthiazide  12.5 mg Oral Daily HOLLI Galvan      hydrocortisone   Topical 4x Daily PRN HOLLI Lion      hydrOXYzine HCL  100 mg Oral Q6H PRN Max 4/day Jordan C Holter, DO      Or    LORazepam  2 mg Intramuscular Q6H PRN Jordan C Holter, DO       hydrOXYzine HCL  100 mg Oral Q6H PRN Max 4/day HOLLI Lion      Or    LORazepam  2 mg Intramuscular Q6H PRN HOLLI Lion      hydrOXYzine HCL  25 mg Oral Q6H PRN Max 4/day [facility] Holter, DO      ibuprofen  600 mg Oral Q8H PRN HOLLI Lion      influenza vaccine  0.5 mL Intramuscular Once Bora Rosario MD      loratadine  10 mg Oral Daily Brina Guillen MD      magnesium hydroxide  30 mL Oral Once Jourdan Dickens, DO      melatonin  3 mg Oral HS PRN Bora Rosario MD      melatonin  9 mg Oral HS Bora Rosario MD      methocarbamol  500 mg Oral Q6H PRN HOLLI Lion      multivitamin-minerals  1 tablet Oral Daily Eileen MiyaHOLLI Jimenez      nicotine polacrilex  2 mg Oral Q4H PRN Bora Rosario MD      OLANZapine  5 mg Oral Q4H PRN Max 3/day [facility] Holter, DO      Or    OLANZapine  2.5 mg Intramuscular Q4H PRN Max 3/day [facility] Holter, DO      OLANZapine  5 mg Oral Q3H PRN Max 3/day [facility] Holter, DO      Or    OLANZapine  5 mg Intramuscular Q3H PRN Max 3/day [facility] Holter, DO      OLANZapine  2.5 mg Oral Q4H PRN Max 6/day [facility] Holter, DO      ondansetron  4 mg Oral Q6H PRN HOLLI Lion      pantoprazole  20 mg Oral QAM [facility] Holter, DO      polyethylene glycol  17 g Oral Daily PRN HOLLI Ghotra      polyethylene glycol  17 g Oral Daily Jourdan Dickens, DO      propranolol  10 mg Oral Q12H KAYLIE HOLLI Lion      senna-docusate sodium  1 tablet Oral Daily PRN [facility] Holter, DO      senna-docusate sodium  2 tablet Oral HS Jourdan Dickens, DO      traZODone  50 mg Oral HS PRN HOLLI Lion      white petrolatum-mineral oil   Topical TID PRN HOLLI Lion         Counseling / Coordination of Care: Total floor / unit time spent today 15 minutes. Greater than 50% of total time was spent with the patient and / or family counseling and / or somewhat receptive to supportive listening and teaching positive coping skills to deal with symptom mangement.      Patient's Rights, confidentiality and exceptions to confidentiality, use of automated medical record, Behavioral Health Services staff access to medical record, and consent to treatment reviewed.    This note has been dictated and hence there may be problems with punctuation, spelling and formatting and if anyone has any concerns please address them to Dr. Mora.  This note is not shared with patient due to potential for making patient's condition worse by knowing the content of the note.    VERONICA FloresS

## 2024-11-20 NOTE — PLAN OF CARE
Problem: Ineffective Coping  Goal: Identifies ineffective coping skills  Outcome: Progressing  Goal: Identifies healthy coping skills  Outcome: Progressing  Goal: Demonstrates healthy coping skills  Outcome: Progressing  Goal: Participates in unit activities  Description: Interventions:  - Provide therapeutic environment   - Provide required programming   - Redirect inappropriate behaviors   Outcome: Progressing   Alberto continues to work towards the goals by attending and actively participating in the groups activities.

## 2024-11-20 NOTE — NURSING NOTE
Pt is visible on the unit and social with select peers. Consumed 100% of dinner. Took medications without incidence. Pt is pleasant and cooperative. Attended 8/10 groups. Reports mild anxiety and depression, and AH that continue to threaten to hurt him and his family. Denies SI/HI/VH. No behavioral issues. Pt offers no complaints.

## 2024-11-20 NOTE — ASSESSMENT & PLAN NOTE
Reviewed on 11/20/24  Ongoing by medical  No associated orders from this encounter found during lookback period of 72 hours.

## 2024-11-20 NOTE — PROGRESS NOTES
11/20/24 0819   Team Meeting   Meeting Type Daily Rounds   Team Members Present   Team Members Present Physician;Nurse;;Other (Discipline and Name)   Physician Team Member Holter, Thomas   Nursing Team Member Claudia   Social Work Team Member Jose J ORTEGA   Other (Discipline and Name) Jeremias Grace MS     Patient/Family Present   Patient Present No   Patient's Family Present No     Groups Participation  10/11.   He's compliant with medications. He's appropriate and engaged in his treatment. AH, depressed and anxious at times. He's social with his peers.

## 2024-11-20 NOTE — ASSESSMENT & PLAN NOTE
Reviewed on 11/20/24  Continue to encourage cessation upon discharge  No associated orders from this encounter found during lookback period of 72 hours.

## 2024-11-20 NOTE — ASSESSMENT & PLAN NOTE
Reviewed on 11/20/24  Follows with medical  Continue senna-docusate sodium dose per medical team.   No associated orders from this encounter found during lookback period of 72 hours.

## 2024-11-20 NOTE — NURSING NOTE
"Alberto was calm and cooperative during assessment. He reports AH of a voice stating it will \"harm him and his family,\" but will distract himself by talking to peers or watching TV. He denies SI, SH, HI, and VH. He reports mild generalized anxiety this AM. He attended meals and group programming in the milieu and was social with peers and staff. He denies new concerns at this time.  "

## 2024-11-20 NOTE — NURSING NOTE
Pt is visible on the unit and social with select peers. Consumed 100% of dinner. Took medications without incidence. Pt is pleasant and cooperative. Attended 10/11 groups. Reports AH that threaten to harm him and his family. Denies SI/HI/VH. No behavioral issues. Pt offers no complaints.

## 2024-11-20 NOTE — ASSESSMENT & PLAN NOTE
Reviewed on 11/20/24  Follows with medical  No associated orders from this encounter found during lookback period of 72 hours.

## 2024-11-20 NOTE — ASSESSMENT & PLAN NOTE
"Reviewed on 11/20/24    Patient reports feelings of depression and anxiety, racing thoughts, hopelessness, low-self esteem, and low energy. He states he slept \"well\" last night and slept in until around 9 am. He states he is still feeling tired this morning. He states he is eating well and has a good appetite. He states his mood is \"depressed\". He states his depression is a 8/10 this morning. He states he is depressed and anxious due to the voices in his head constantly telling him they are going to kill him and his family and that he is going to hell. He states his anxiety is an 8/10 this morning as well. He states the voices are random people and not his own voice. He states he has been hearing this voices constantly for over 2 years. He states the voices have not said anything new recently. He states \"working out\" and \"walking and talking\" with peers helps him cope with the voices. He denies any visual hallucinations. He reports persecutory delusions in regards to the voices he is hearing but states he does not think anyone here on the unit is going to hurt him. He denies grandiose, somatic, or bizarre delusions. He reports passive death wishes \"sometimes\" but states he does not have them currently. He denies thoughts of harm to self and others, SI, or HI.     Continue Clozapine 225 mg daily at bedtime for psychosis -- to consider further careful titration for Sxs depending on response  Continue to obtain CBC with diff, CRP, and CK (weekly on Tues), and a Clozapine level and BNP (every 2 weeks on Tues)- labs taken yesterday 11/19 and are still pending     Continue Abilify 30 mg once daily for psychosis  Continue Escitalopram 20 mg once daily for depression and anxiety  Continue Senna-Docusate sodium one 50 mg tablet daily before bed for constipation  - decreased from two tablets daily on 11/3 due to reported loose stools  Continue propanolol 10 mg twice daily for anxiety   Continue amlodipine 5 mg PO daily     Pt " remains on dual antipsychotic Tx due to failure on monotherapy      ACT team referral was made  No associated orders from this encounter found during lookback period of 72 hours.

## 2024-11-20 NOTE — PLAN OF CARE
Problem: Alteration in Thoughts and Perception  Goal: Treatment Goal: Gain control of psychotic behaviors/thinking, reduce/eliminate presenting symptoms and demonstrate improved reality functioning upon discharge  Outcome: Progressing  Goal: Refrain from acting on delusional thinking/internal stimuli  Description: Interventions:  - Monitor patient closely, per order   - Utilize least restrictive measures   - Set reasonable limits, give positive feedback for acceptable   - Administer medications as ordered and monitor of potential side effects  Outcome: Progressing  Goal: Agree to be compliant with medication regime, as prescribed and report medication side effects  Description: Interventions:  - Offer appropriate PRN medication and supervise ingestion; conduct AIMS, as needed   Outcome: Progressing  Goal: Attend and participate in unit activities, including therapeutic, recreational, and educational groups  Description: Interventions:  -Encourage Visitation and family involvement in care  Outcome: Progressing  Goal: Complete daily ADLs, including personal hygiene independently, as able  Description: Interventions:  - Observe, teach, and assist patient with ADLS  - Monitor and promote a balance of rest/activity, with adequate nutrition and elimination   Outcome: Progressing     Problem: Ineffective Coping  Goal: Participates in unit activities  Description: Interventions:  - Provide therapeutic environment   - Provide required programming   - Redirect inappropriate behaviors   Outcome: Progressing  Goal: Patient/Family verbalizes awareness of resources  Outcome: Progressing     Problem: Depression  Goal: Treatment Goal: Demonstrate behavioral control of depressive symptoms, verbalize feelings of improved mood/affect, and adopt new coping skills prior to discharge  Outcome: Progressing  Goal: Refrain from harming self  Description: Interventions:  - Monitor patient closely, per order   - Supervise medication  ingestion, monitor effects and side effects   Outcome: Progressing  Goal: Refrain from isolation  Description: Interventions:  - Develop a trusting relationship   - Encourage socialization   Outcome: Progressing  Goal: Refrain from self-neglect  Outcome: Progressing  Goal: Attend and participate in unit activities, including therapeutic, recreational, and educational groups  Description: Interventions:  - Provide therapeutic and educational activities daily, encourage attendance and participation, and document same in the medical record   Outcome: Progressing     Problem: Anxiety  Goal: Anxiety is at manageable level  Description: Interventions:  - Assess and monitor patient's anxiety level.   - Monitor for signs and symptoms (heart palpitations, chest pain, shortness of breath, headaches, nausea, feeling jumpy, restlessness, irritable, apprehensive).   - Collaborate with interdisciplinary team and initiate plan and interventions as ordered.  - [place] patient to unit/surroundings  - Explain treatment plan  - Encourage participation in care  - Encourage verbalization of concerns/fears  - Identify coping mechanisms  - Assist in developing anxiety-reducing skills  - Administer/offer alternative therapies  - Limit or eliminate stimulants  Outcome: Progressing     Problem: Alteration in Orientation  Goal: Treatment Goal: Demonstrate a reduction of confusion and improved orientation to person, place, time and/or situation upon discharge, according to optimum baseline/ability  Outcome: Progressing  Goal: Interact with staff daily  Description: Interventions:  - Assess and re-assess patient's level of orientation  - Engage patient in 1 on 1 interactions, daily, for a minimum of 15 minutes   - Establish rapport/trust with patient   Outcome: Progressing  Goal: Express concerns related to confused thinking related to:  Description: Interventions:  - Encourage patient to express feelings, fears, frustrations, hopes  - Assign  consistent caregivers   - [place]/re-orient patient as needed  - Allow comfort items, as appropriate  - Provide visual cues, signs, etc.   Outcome: Progressing  Goal: Allow medical examinations, as recommended  Description: Interventions:  - Provide physical/neurological exams and/or referrals, per provider   Outcome: Progressing  Goal: Cooperate with recommended testing/procedures  Description: Interventions:  - Determine need for ancillary testing  - Observe for mental status changes  - Implement falls/precaution protocol   Outcome: Progressing  Goal: Complete daily ADLs, including personal hygiene independently, as able  Description: Interventions:  - Observe, teach, and assist patient with ADLS  Outcome: Progressing     Problem: DISCHARGE PLANNING - CARE MANAGEMENT  Goal: Discharge to post-acute care or home with appropriate resources  Description: INTERVENTIONS:  - Conduct assessment to determine patient/family and health care team treatment goals, and need for post-acute services based on payer coverage, community resources, and patient preferences, and barriers to discharge  - Address psychosocial, clinical, and financial barriers to discharge as identified in assessment in conjunction with the patient/family and health care team  - Arrange appropriate level of post-acute services according to patient's   needs and preference and payer coverage in collaboration with the physician and health care team  - Communicate with and update the patient/family, physician, and health care team regarding progress on the discharge plan  - Arrange appropriate transportation to post-acute venues  Outcome: Progressing

## 2024-11-21 PROCEDURE — 99232 SBSQ HOSP IP/OBS MODERATE 35: CPT | Performed by: PSYCHIATRY & NEUROLOGY

## 2024-11-21 RX ADMIN — NICOTINE POLACRILEX 2 MG: 2 GUM, CHEWING ORAL at 21:30

## 2024-11-21 RX ADMIN — ARIPIPRAZOLE 30 MG: 15 TABLET ORAL at 08:51

## 2024-11-21 RX ADMIN — CLOZAPINE 225 MG: 25 TABLET ORAL at 21:19

## 2024-11-21 RX ADMIN — SENNOSIDES AND DOCUSATE SODIUM 2 TABLET: 50; 8.6 TABLET ORAL at 21:19

## 2024-11-21 RX ADMIN — PANTOPRAZOLE SODIUM 20 MG: 20 TABLET, DELAYED RELEASE ORAL at 08:51

## 2024-11-21 RX ADMIN — NICOTINE POLACRILEX 2 MG: 2 GUM, CHEWING ORAL at 08:51

## 2024-11-21 RX ADMIN — MULTIPLE VITAMINS W/ MINERALS TAB 1 TABLET: TAB ORAL at 08:51

## 2024-11-21 RX ADMIN — HYDROCHLOROTHIAZIDE 12.5 MG: 12.5 TABLET ORAL at 08:51

## 2024-11-21 RX ADMIN — MELATONIN TAB 3 MG 9 MG: 3 TAB at 21:18

## 2024-11-21 RX ADMIN — ESCITALOPRAM OXALATE 20 MG: 10 TABLET, FILM COATED ORAL at 08:51

## 2024-11-21 RX ADMIN — DOCUSATE SODIUM 100 MG: 100 CAPSULE, LIQUID FILLED ORAL at 17:04

## 2024-11-21 RX ADMIN — PROPRANOLOL HYDROCHLORIDE 10 MG: 10 TABLET ORAL at 08:51

## 2024-11-21 RX ADMIN — NICOTINE POLACRILEX 2 MG: 2 GUM, CHEWING ORAL at 12:54

## 2024-11-21 RX ADMIN — GLYCOPYRROLATE 2 MG: 1 TABLET ORAL at 21:19

## 2024-11-21 RX ADMIN — NICOTINE POLACRILEX 2 MG: 2 GUM, CHEWING ORAL at 17:08

## 2024-11-21 RX ADMIN — GLYCOPYRROLATE 1 MG: 1 TABLET ORAL at 08:51

## 2024-11-21 RX ADMIN — LORATADINE 10 MG: 10 TABLET ORAL at 08:51

## 2024-11-21 RX ADMIN — ATROPINE SULFATE 1 DROP: 10 SOLUTION/ DROPS OPHTHALMIC at 21:42

## 2024-11-21 RX ADMIN — AMLODIPINE BESYLATE 5 MG: 5 TABLET ORAL at 08:52

## 2024-11-21 RX ADMIN — DOCUSATE SODIUM 100 MG: 100 CAPSULE, LIQUID FILLED ORAL at 08:51

## 2024-11-21 RX ADMIN — GLYCOPYRROLATE 1 MG: 1 TABLET ORAL at 14:15

## 2024-11-21 NOTE — NURSING NOTE
Received pt in bed at change of shift with eyes closed; chest movement noted.  Continues the same thus this far as per q 15 min room checks.   Will continue to monitor behavior, sleeping pattern and any medical issues that may arise.    0556:  Sleeping 7+ hrs thus this far

## 2024-11-21 NOTE — NURSING NOTE
"Pt is calm, cooperative and visible on milieu. Pt reports depression, anxiety and AH as \"the same,\" and denies SI/HI/AH/VH. Pt interacts appropriately with peers and reports sleeping well. Pt is able to make needs known and is medication compliant. Q 15 min checks maintained.   "

## 2024-11-21 NOTE — ASSESSMENT & PLAN NOTE
Reviewed on 11/21/24  Ongoing by medical  No associated orders from this encounter found during lookback period of 72 hours.

## 2024-11-21 NOTE — SOCIAL WORK
[place] e-mailed this writer requesting patient's ACT referral submitted to [facility] supervisor. This writer forwarded ACT referral to Khoa Casper with [facility].

## 2024-11-21 NOTE — ASSESSMENT & PLAN NOTE
Reviewed on 11/21/24  Follows with medical  No associated orders from this encounter found during lookback period of 72 hours.

## 2024-11-21 NOTE — PROGRESS NOTES
"Progress Note - Behavioral Health   Name: Alberto Berumen 29 y.o. male I MRN: [id number]  Unit/Bed#: [facility] 112-02 I Date of Admission: 3/29/2024   Date of Service: 11/21/2024 I Hospital Day: 237     Assessment & Plan  Schizoaffective disorder, bipolar type (HCC)  Reviewed on 11/21/24    Patient reports feelings of depression and anxiety, racing thoughts, hopelessness \"sometimes\", low-self esteem, and low energy. He states he slept \"well\" last night. He states his mood is \"stressed and depressed\". He states his depression is a 8/10 this morning. He states he is depressed and anxious due to the voices in his head constantly telling him they are going to kill him and his family and that he is going to hell. He states his anxiety is an 7/10 this morning. He states the voices are random people and not his own voice. He states he has been hearing this voices constantly for over 2 years. He states the voices have not said anything new recently. He states \"working out\" helps him cope with the voices but he could not work out yesterday because the room was not open. He denies any visual hallucinations. He reports persecutory delusions in regards to the voices he is hearing but states he does not think anyone here on the unit is going to hurt him. He denies grandiose, somatic, or bizarre delusions. He reports passive death wishes \"sometimes\" but states he does not have them currently. He denies thoughts of harm to self and others, SI, or HI.     Continue Clozapine 225 mg daily at bedtime for psychosis -- to consider further careful titration for Sxs depending on response  Continue to obtain CBC with diff, CRP, and CK (weekly on Tues), and a Clozapine level and BNP (every 2 weeks on Tues)- labs taken 11/19 and are still pending     Continue Abilify 30 mg once daily for psychosis  Continue Escitalopram 20 mg once daily for depression and anxiety  Continue Senna-Docusate sodium one 50 mg tablet daily before bed for " constipation  - decreased from two tablets daily on 11/3 due to reported loose stools  Continue propanolol 10 mg twice daily for anxiety   Continue amlodipine 5 mg PO daily     Pt remains on dual antipsychotic Tx due to failure on monotherapy      ACT team referral was made  No associated orders from this encounter found during lookback period of 72 hours.    Chronic idiopathic constipation  Reviewed on 11/21/24  Follows with medical  Continue senna-docusate sodium dose per medical team.   No associated orders from this encounter found during lookback period of 72 hours.  GERD (gastroesophageal reflux disease)  Reviewed on 11/21/24  Follows with medical  Continue famotidine 20 mg tablet BID per medical team   Continue glycopyrrolate 1 mg tablet BID AM and afternoon per medical team  Continue glycopyrrolate 2 mg tablet before bed per medical team  Continue pantoprazole EC 20 mg tablet every morning per medical team  No associated orders from this encounter found during lookback period of 72 hours.  Medical clearance for psychiatric admission  Reviewed on 11/21/24  Ongoing by medical  No associated orders from this encounter found during lookback period of 72 hours.    Tobacco abuse  Reviewed on 11/21/24  Continue to encourage cessation upon discharge  No associated orders from this encounter found during lookback period of 72 hours.  Elevated hemoglobin A1c  Reviewed on 11/21/24  Follows with medical  No associated orders from this encounter found during lookback period of 72 hours.  Class 2 obesity in adult  Reviewed on 11/21/24  Follows with medical  No associated orders from this encounter found during lookback period of 72 hours.    Primary hypertension  Reviewed on 11/21/24  Follows with medical  No associated orders from this encounter found during lookback period of 72 hours.    Psychiatry Progress Note [facility]    Progress towards goals: Progressing    Review of systems:  Unremarkable    Psychiatric Diagnosis: Schizoaffective disorder, bipolar type    Assessment  Overall Status:  Status quo- continues to feel anxious and depressed regarding threatening voices   Certification Statement: The patient will continue to require additional inpatient hospital stay due to Schizoaffective disorder and related symptoms        Medications: as above  Side effects from treatment: Denied  Medication changes: as above  Medication education: Risks side effects benefits and precautions of medications discussed with patient and they did verbalize an understanding about risks for metabolic syndrome from being on neuroleptics and tardive dyskinesia etc.  All medications reviewed and I recommend that they be continued for symptom management  Understanding of medications: Risks side effects benefits and precautions of medications discussed with patient and they did verbalize an understanding about risks for metabolic syndrome from being on neuroleptics and tardive dyskinesia etc., and patient appeared to have understanding.  Justification for dual anti-psychotics: Due to failure on monotherapy     Non-pharmacological treatments  Continue with individual, group, milieu and occupational therapy using recovery principles and psycho-education about accepting illness and the need for treatment.  Behavioral health checks every 7 minutes    Safety  Safety and communication plan established to target dynamic risk factors discussed above.    Discharge Plan   To live with aunt once [facility] approves funding on ACT team    Interval Progress: Per treatment team, patient is compliant with meals and medications. He is pleasant, cooperative, and social with peers on unit. He expressed he was upset about gaining weight and ate 0%, 25%, and 100% for meals yesterday. He did not require any PRN psychotropic medications yesterday or this morning. He attended 8/10 groups yesterday.   Today, patient evaluated by this writer and  "Dr. Rosario at bedside. He states his mood is \"stressed and depressed\" due to the constant voices he hears telling him they are going to kill him and his family an that he is going to hell. He states his depression is 8/10 this morning. He states the voices are constant and he has been hearing them for the past 2 years. He states the voices are random people and it is never his own voice. He states he will continue to try to find ways to cope with the voices as they are not going to go away. He states he \"works out\" to help cope with the voices but was unable to do so yesterday because the room was not open. He denies visual hallucinations. He reports persecutory delusions in regards to the threatening voices he is hearing but states he does not believe anyone here on the unit is going to hurt him and he feels safe here. He denies grandiose, somatic, or bizarre delusions. Patient reports feelings of depression, anxiety, racing thoughts, low energy, and low self-esteem. He states he feels hopelessness \"sometimes\". He states his anxiety is a 7/10 this morning. He reports passive death wishes \"sometimes\" but states he does not feel them currently. He denies any thoughts to harm self or others, SI, or HI. He states he slept \"well\" last night. He states he is eating well and has a good appetite but he is a little upset about gaining weight. He states he is trying to exercise through \"working out\" and not eat as much bad food. He denies any adverse medication side effects.     Acceptance by patient: Accepting of medications and treatments  Hopefulness in recovery: Hopeful about living with his aunt   Involved in reintegration process: Patient communication with his sister and aunt on phone  Trusting in relationship with psychiatrist: Present   Sleep: Good   Appetite: Good  Compliance with Medications: Compliant  Group attendance: 8/10 groups yesterday  Significant events: None in the past 24 hours    Mental Status " "Exam  Appearance: age appropriate, casually dressed, looks stated age, disheveled  Behavior: cooperative, calm, fair  eye contact  Speech: normal rate, normal volume, normal pitch, not pressured  Mood: \"stressed and depressed\"   Affect: constricted, mood-congruent  Thought Process: organized, logical, coherent, goal directed, linear  Thought Content: Mild paranoia regarding threatening voices  Perceptual Disturbances: Auditory hallucinations of voices telling him they are going to kill him and his family and he is going to hell. Denies visual hallucinations when asked. Does not appear to be responding to internal stimuli   Sensorium: alert and oriented to person, place, time and situation  Cognition: recent and remote memory grossly intact  Consciousness: alert and awake  Attention: attention span and concentration are age appropriate  Intellect: appears to be of average intelligence  Insight: fair  Judgement: fair  Motor Activity: no abnormal movements     Vitals  Temp:  [97.5 °F (36.4 °C)-97.6 °F (36.4 °C)] 97.6 °F (36.4 °C)  HR:  [89-99] 90  Resp:  [18] 18  BP: (115-122)/(55-77) 122/77  SpO2:  [98 %-99 %] 99 %    Intake/Output Summary (Last 24 hours) at 11/21/2024 1049  Last data filed at 11/20/2024 1254  Gross per 24 hour   Intake 330 ml   Output --   Net 330 ml       Lab Results: All Labs For Current Hospital Admission Reviewed  Results from last 7 days   Lab Units 11/19/24  0526   WBC Thousand/uL 10.30*   RBC Million/uL 5.42   HEMOGLOBIN g/dL 12.6   HEMATOCRIT % 42.5   MCV fL 78*   PLATELETS Thousands/uL 253   TOTAL NEUT ABS Thousands/µL 4.94   CLOZAPINE LVL ng/mL 288*       Current Facility-Administered Medications   Medication Dose Route Frequency Provider Last Rate    acetaminophen  650 mg Oral Q4H PRN Jordan C Holter,       acetaminophen  650 mg Oral Q6H PRN HOLLI Lion      aluminum-magnesium hydroxide-simethicone  30 mL Oral Q4H PRN Jordan C Holter, DO      amLODIPine  5 mg Oral Daily Eveline " HOLLI Hunt      ARIPiprazole  30 mg Oral Daily Bora Rosario MD      Artificial Tears  1 drop Both Eyes Q3H PRN Jordan C Holter, DO      atropine  1 drop Sublingual HS Harjeet Torres, DO      haloperidol lactate  2.5 mg Intramuscular Q4H PRN Max 4/day STEVE LionNP      And    LORazepam  1 mg Intramuscular Q4H PRN Max 4/day STEVE LionNP      And    benztropine  0.5 mg Intramuscular Q4H PRN Max 4/day HOLLI Lion      benztropine  1 mg Intramuscular Q4H PRN Max 6/day Jordan C Holter, DO      haloperidol lactate  5 mg Intramuscular Q4H PRN Max 4/day HOLLI Lion      And    LORazepam  2 mg Intramuscular Q4H PRN Max 4/day HOLLI Lion      And    benztropine  1 mg Intramuscular Q4H PRN Max 4/day HOLLI Lion      benztropine  1 mg Oral Q4H PRN Max 6/day HOLLI Lion      benztropine  1 mg Oral Q4H PRN Max 6/day Jordan C Holter, DO      bisacodyl  10 mg Rectal Daily PRN HOLLI Lion      calcium carbonate  500 mg Oral BID PRN HOLLI Monge      cloZAPine  225 mg Oral HS Hardik So MD      hydrOXYzine HCL  50 mg Oral Q6H PRN Max 4/day Jordan C Holter, DO      Or    diphenhydrAMINE  50 mg Intramuscular Q6H PRN Jordan C Holter, DO      hydrOXYzine HCL  50 mg Oral Q6H PRN Max 4/day HOLLI Lion      Or    diphenhydrAMINE  50 mg Intramuscular Q6H PRN HOLLI Lion      diphenhydrAMINE-zinc acetate   Topical BID PRN HOLLI Lion      docusate sodium  100 mg Oral BID Jourdan Dickens, DO      escitalopram  20 mg Oral Daily HOLLI Lion      famotidine  20 mg Oral BID PRN HOLLI Monge      fluticasone  1 spray Each Nare Daily PRN HOLLI Monge      glycopyrrolate  1 mg Oral BID (AM & Afternoon) Bora Rosario MD      glycopyrrolate  2 mg Oral HS Bora Rosario MD      haloperidol  1 mg Oral Q6H PRN HOLLI Lion      haloperidol  2.5 mg Oral Q4H PRN Max 4/day HOLLI Lion       haloperidol  5 mg Oral Q4H PRN Max 4/day HOLLI Lion      hydroCHLOROthiazide  12.5 mg Oral Daily HOLLI Galvan      hydrocortisone   Topical 4x Daily PRN HOLLI Lion      hydrOXYzine HCL  100 mg Oral Q6H PRN Max 4/day [facility] Holter, DO      Or    LORazepam  2 mg Intramuscular Q6H PRN [facility] Holter, DO      hydrOXYzine HCL  100 mg Oral Q6H PRN Max 4/day HOLLI Lion      Or    LORazepam  2 mg Intramuscular Q6H PRN HOLLI Lion      hydrOXYzine HCL  25 mg Oral Q6H PRN Max 4/day [facility] Holter, DO      ibuprofen  600 mg Oral Q8H PRN HOLLI Lion      influenza vaccine  0.5 mL Intramuscular Once Bora Rosario MD      loratadine  10 mg Oral Daily Brina Guillen MD      magnesium hydroxide  30 mL Oral Once Jourdan Dickens, DO      melatonin  3 mg Oral HS PRN Bora Rosario MD      melatonin  9 mg Oral HS Bora Rosario MD      methocarbamol  500 mg Oral Q6H PRN HOLLI Lion      multivitamin-minerals  1 tablet Oral Daily Eileen Cherrysathish Jensen, HOLLI      nicotine polacrilex  2 mg Oral Q4H PRN Bora Rosario MD      OLANZapine  5 mg Oral Q4H PRN Max 3/day [facility] Holter, DO      Or    OLANZapine  2.5 mg Intramuscular Q4H PRN Max 3/day [facility] Holter, DO      OLANZapine  5 mg Oral Q3H PRN Max 3/day [facility] Holter, DO      Or    OLANZapine  5 mg Intramuscular Q3H PRN Max 3/day [facility] Holter, DO      OLANZapine  2.5 mg Oral Q4H PRN Max 6/day [facility] Holter, DO      ondansetron  4 mg Oral Q6H PRN HOLLI Lion      pantoprazole  [facility]      polyethylene glycol  17 g Oral Daily PRN HOLLI Ghotra      polyethylene glycol  17 g Oral Daily Jourdan Dickens, DO      propranolol  10 mg Oral Q12H KAYLIE HOLLI Lion      senna-docusate sodium  1 tablet Oral Daily PRN [facility] Holter, DO      senna-docusate sodium  2 tablet Oral HS Jourdan Dickens, DO      traZODone  50 mg Oral HS PRN HOLLI Lion      white petrolatum-mineral  oil   Topical TID MISHELN HOLLI Lion         Counseling / Coordination of Care: Total floor / unit time spent today 15 minutes. Greater than 50% of total time was spent with the patient and / or family counseling and / or somewhat receptive to supportive listening and teaching positive coping skills to deal with symptom mangement.     Patient's Rights, confidentiality and exceptions to confidentiality, use of automated medical record, Behavioral Health Services staff access to medical record, and consent to treatment reviewed.    This note has been dictated and hence there may be problems with punctuation, spelling and formatting and if anyone has any concerns please address them to Dr. Mora.  This note is not shared with patient due to potential for making patient's condition worse by knowing the content of the note.    VERONICA FloresS

## 2024-11-21 NOTE — PROGRESS NOTES
"   11/21/24 1600   Activity/Group Checklist   Group Community meeting   Attendance Attended   Attendance Duration (min) 16-30   Interactions Interacted appropriately   Affect/Mood Appropriate   Goals Achieved Identified feelings;Able to engage in interactions;Able to listen to others     We talked after group due to him expressing feeling \"depressed\". He said that the recent lost has made him reflect on his mortality and how fragile life is. TW reminded him that, that's part of life and we have to do are best to still enjoy it and not be stuck in the unknown length of it. He was told to continue using his coping skills and that when needed he can always talk to staff.  "

## 2024-11-21 NOTE — ASSESSMENT & PLAN NOTE
Reviewed on 11/21/24  Follows with medical  Continue senna-docusate sodium dose per medical team.   No associated orders from this encounter found during lookback period of 72 hours.

## 2024-11-21 NOTE — PLAN OF CARE
Problem: Alteration in Thoughts and Perception  Goal: Treatment Goal: Gain control of psychotic behaviors/thinking, reduce/eliminate presenting symptoms and demonstrate improved reality functioning upon discharge  Outcome: Progressing  Goal: Refrain from acting on delusional thinking/internal stimuli  Description: Interventions:  - Monitor patient closely, per order   - Utilize least restrictive measures   - Set reasonable limits, give positive feedback for acceptable   - Administer medications as ordered and monitor of potential side effects  Outcome: Progressing  Goal: Agree to be compliant with medication regime, as prescribed and report medication side effects  Description: Interventions:  - Offer appropriate PRN medication and supervise ingestion; conduct AIMS, as needed   Outcome: Progressing  Goal: Attend and participate in unit activities, including therapeutic, recreational, and educational groups  Description: Interventions:  -Encourage Visitation and family involvement in care  Outcome: Progressing  Goal: Recognize dysfunctional thoughts, communicate reality-based thoughts at the time of discharge  Description: Interventions:  - Provide medication and psycho-education to assist patient in compliance and developing insight into his/her illness   Outcome: Progressing  Goal: Complete daily ADLs, including personal hygiene independently, as able  Description: Interventions:  - Observe, teach, and assist patient with ADLS  - Monitor and promote a balance of rest/activity, with adequate nutrition and elimination   Outcome: Progressing     Problem: Ineffective Coping  Goal: Participates in unit activities  Description: Interventions:  - Provide therapeutic environment   - Provide required programming   - Redirect inappropriate behaviors   Outcome: Progressing  Goal: Patient/Family participate in treatment and DC plans  Description: Interventions:  - Provide therapeutic environment  Outcome: Progressing  Goal:  Patient/Family verbalizes awareness of resources  Outcome: Progressing     Problem: Depression  Goal: Treatment Goal: Demonstrate behavioral control of depressive symptoms, verbalize feelings of improved mood/affect, and adopt new coping skills prior to discharge  Outcome: Progressing  Goal: Verbalize thoughts and feelings  Description: Interventions:  - Assess and re-assess patient's level of risk   - Engage patient in 1:1 interactions, daily, for a minimum of 15 minutes   - Encourage patient to express feelings, fears, frustrations, hopes   Outcome: Progressing  Goal: Refrain from harming self  Description: Interventions:  - Monitor patient closely, per order   - Supervise medication ingestion, monitor effects and side effects   Outcome: Progressing  Goal: Refrain from isolation  Description: Interventions:  - Develop a trusting relationship   - Encourage socialization   Outcome: Progressing  Goal: Refrain from self-neglect  Outcome: Progressing     Problem: Anxiety  Goal: Anxiety is at manageable level  Description: Interventions:  - Assess and monitor patient's anxiety level.   - Monitor for signs and symptoms (heart palpitations, chest pain, shortness of breath, headaches, nausea, feeling jumpy, restlessness, irritable, apprehensive).   - Collaborate with interdisciplinary team and initiate plan and interventions as ordered.  - [place] patient to unit/surroundings  - Explain treatment plan  - Encourage participation in care  - Encourage verbalization of concerns/fears  - Identify coping mechanisms  - Assist in developing anxiety-reducing skills  - Administer/offer alternative therapies  - Limit or eliminate stimulants  Outcome: Progressing     Problem: Alteration in Orientation  Goal: Treatment Goal: Demonstrate a reduction of confusion and improved orientation to person, place, time and/or situation upon discharge, according to optimum baseline/ability  Outcome: Progressing  Goal: Interact with staff  daily  Description: Interventions:  - Assess and re-assess patient's level of orientation  - Engage patient in 1 on 1 interactions, daily, for a minimum of 15 minutes   - Establish rapport/trust with patient   Outcome: Progressing  Goal: Allow medical examinations, as recommended  Description: Interventions:  - Provide physical/neurological exams and/or referrals, per provider   Outcome: Progressing  Goal: Complete daily ADLs, including personal hygiene independently, as able  Description: Interventions:  - Observe, teach, and assist patient with ADLS  Outcome: Progressing     Problem: DISCHARGE PLANNING - CARE MANAGEMENT  Goal: Discharge to post-acute care or home with appropriate resources  Description: INTERVENTIONS:  - Conduct assessment to determine patient/family and health care team treatment goals, and need for post-acute services based on payer coverage, community resources, and patient preferences, and barriers to discharge  - Address psychosocial, clinical, and financial barriers to discharge as identified in assessment in conjunction with the patient/family and health care team  - Arrange appropriate level of post-acute services according to patient's   needs and preference and payer coverage in collaboration with the physician and health care team  - Communicate with and update the patient/family, physician, and health care team regarding progress on the discharge plan  - Arrange appropriate transportation to post-acute venues  Outcome: Progressing

## 2024-11-21 NOTE — PROGRESS NOTES
11/21/24 0727   Team Meeting   Meeting Type Daily Rounds   Team Members Present   Team Members Present Physician;Nurse;;Other (Discipline and Name)   Physician Team Member Abraham   Nursing Team Member Claudia   Social Work Team Member Jose J ORTEGA   Other (Discipline and Name) Wang PCM   Patient/Family Present   Patient Present No   Patient's Family Present No     Groups Participation  9/11.   Patient's compliant and engages in his treatment. He's appropriate and interacts with his peers. He reports some depression and continued AH.

## 2024-11-21 NOTE — SOCIAL WORK
This writer met with patient for an individual session to discuss their ongoing mental health concerns and current stressors.  The patient reported continued symptoms of depression and AH but noted that these symptoms have been less stressful recently. The patient states they have been using coping skills to manage symptoms effectively. The patient expressed concerns about financial stressors, specifically not having access to their bank account.   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 Discussed the possibility of the patient visiting the bank in person to address the lack of access to financial records. Proposed forwarding a referral to another ACT team to determine whether they have availability for [facility] to provide additional support. Patient was receptive to the plans discussed and expressed willingness to follow through with the proposed actions.

## 2024-11-21 NOTE — ASSESSMENT & PLAN NOTE
"Reviewed on 11/21/24    Patient reports feelings of depression and anxiety, racing thoughts, hopelessness \"sometimes\", low-self esteem, and low energy. He states he slept \"well\" last night. He states his mood is \"stressed and depressed\". He states his depression is a 8/10 this morning. He states he is depressed and anxious due to the voices in his head constantly telling him they are going to kill him and his family and that he is going to hell. He states his anxiety is an 7/10 this morning. He states the voices are random people and not his own voice. He states he has been hearing this voices constantly for over 2 years. He states the voices have not said anything new recently. He states \"working out\" helps him cope with the voices but he could not work out yesterday because the room was not open. He denies any visual hallucinations. He reports persecutory delusions in regards to the voices he is hearing but states he does not think anyone here on the unit is going to hurt him. He denies grandiose, somatic, or bizarre delusions. He reports passive death wishes \"sometimes\" but states he does not have them currently. He denies thoughts of harm to self and others, SI, or HI.     Continue Clozapine 225 mg daily at bedtime for psychosis -- to consider further careful titration for Sxs depending on response  Continue to obtain CBC with diff, CRP, and CK (weekly on Tues), and a Clozapine level and BNP (every 2 weeks on Tues)- labs taken 11/19 and are still pending     Continue Abilify 30 mg once daily for psychosis  Continue Escitalopram 20 mg once daily for depression and anxiety  Continue Senna-Docusate sodium one 50 mg tablet daily before bed for constipation  - decreased from two tablets daily on 11/3 due to reported loose stools  Continue propanolol 10 mg twice daily for anxiety   Continue amlodipine 5 mg PO daily     Pt remains on dual antipsychotic Tx due to failure on monotherapy      ACT team referral was " made  No associated orders from this encounter found during lookback period of 72 hours.

## 2024-11-21 NOTE — ASSESSMENT & PLAN NOTE
Reviewed on 11/21/24  Follows with medical  Continue famotidine 20 mg tablet BID per medical team   Continue glycopyrrolate 1 mg tablet BID AM and afternoon per medical team  Continue glycopyrrolate 2 mg tablet before bed per medical team  Continue pantoprazole EC 20 mg tablet every morning per medical team  No associated orders from this encounter found during lookback period of 72 hours.

## 2024-11-21 NOTE — ASSESSMENT & PLAN NOTE
Reviewed on 11/21/24  Continue to encourage cessation upon discharge  No associated orders from this encounter found during lookback period of 72 hours.

## 2024-11-21 NOTE — NURSING NOTE
"Alberto was calm and cooperative during assessment. He reports AH stating it will \"harm me and my family,\" and denies SI, SH, HI, and VH. He is social in the milieu with peers and staff. He attended meals in the dining room. He requested PRN nicorette gum and was medication compliant. He utilized the gym room's punching bag as a coping skill for stress relief, which he stated was effective. He denies new concerns at this time.   "

## 2024-11-22 PROCEDURE — 99232 SBSQ HOSP IP/OBS MODERATE 35: CPT | Performed by: PSYCHIATRY & NEUROLOGY

## 2024-11-22 RX ADMIN — DOCUSATE SODIUM 100 MG: 100 CAPSULE, LIQUID FILLED ORAL at 09:00

## 2024-11-22 RX ADMIN — DOCUSATE SODIUM 100 MG: 100 CAPSULE, LIQUID FILLED ORAL at 17:08

## 2024-11-22 RX ADMIN — ARIPIPRAZOLE 30 MG: 15 TABLET ORAL at 09:00

## 2024-11-22 RX ADMIN — NICOTINE POLACRILEX 2 MG: 2 GUM, CHEWING ORAL at 21:08

## 2024-11-22 RX ADMIN — PANTOPRAZOLE SODIUM 20 MG: 20 TABLET, DELAYED RELEASE ORAL at 09:00

## 2024-11-22 RX ADMIN — SENNOSIDES AND DOCUSATE SODIUM 2 TABLET: 50; 8.6 TABLET ORAL at 21:08

## 2024-11-22 RX ADMIN — CLOZAPINE 225 MG: 25 TABLET ORAL at 21:08

## 2024-11-22 RX ADMIN — NICOTINE POLACRILEX 2 MG: 2 GUM, CHEWING ORAL at 09:00

## 2024-11-22 RX ADMIN — AMLODIPINE BESYLATE 5 MG: 5 TABLET ORAL at 09:01

## 2024-11-22 RX ADMIN — PROPRANOLOL HYDROCHLORIDE 10 MG: 10 TABLET ORAL at 21:08

## 2024-11-22 RX ADMIN — LORATADINE 10 MG: 10 TABLET ORAL at 09:00

## 2024-11-22 RX ADMIN — NICOTINE POLACRILEX 2 MG: 2 GUM, CHEWING ORAL at 17:37

## 2024-11-22 RX ADMIN — PROPRANOLOL HYDROCHLORIDE 10 MG: 10 TABLET ORAL at 09:00

## 2024-11-22 RX ADMIN — GLYCOPYRROLATE 2 MG: 1 TABLET ORAL at 21:08

## 2024-11-22 RX ADMIN — GLYCOPYRROLATE 1 MG: 1 TABLET ORAL at 09:00

## 2024-11-22 RX ADMIN — GLYCOPYRROLATE 1 MG: 1 TABLET ORAL at 13:31

## 2024-11-22 RX ADMIN — MELATONIN TAB 3 MG 9 MG: 3 TAB at 21:08

## 2024-11-22 RX ADMIN — ESCITALOPRAM OXALATE 20 MG: 10 TABLET, FILM COATED ORAL at 09:00

## 2024-11-22 RX ADMIN — ATROPINE SULFATE 1 DROP: 10 SOLUTION/ DROPS OPHTHALMIC at 21:43

## 2024-11-22 RX ADMIN — HYDROCHLOROTHIAZIDE 12.5 MG: 12.5 TABLET ORAL at 09:00

## 2024-11-22 RX ADMIN — MULTIPLE VITAMINS W/ MINERALS TAB 1 TABLET: TAB ORAL at 09:00

## 2024-11-22 RX ADMIN — NICOTINE POLACRILEX 2 MG: 2 GUM, CHEWING ORAL at 13:31

## 2024-11-22 NOTE — PROGRESS NOTES
11/22/24 0733   Team Meeting   Meeting Type Daily Rounds   Team Members Present   Team Members Present Physician;Nurse;;Other (Discipline and Name)   Physician Team Member Holter, Thomas   Nursing Team Member Claudia   Social Work Team Member Jose J ORTEGA   Other (Discipline and Name) Serg Grace PharmD   Patient/Family Present   Patient Present No   Patient's Family Present No     Groups Participation  8/10.   Positive interactions with staff and peers. He's engaged in his treatment. Pleasant and cooperative.

## 2024-11-22 NOTE — ASSESSMENT & PLAN NOTE
Reviewed on 11/22/24  Ongoing by medical  No associated orders from this encounter found during lookback period of 72 hours.

## 2024-11-22 NOTE — ASSESSMENT & PLAN NOTE
Reviewed on 11/22/24  Follows with medical  Continue senna-docusate sodium dose per medical team.   No associated orders from this encounter found during lookback period of 72 hours.

## 2024-11-22 NOTE — ASSESSMENT & PLAN NOTE
"Reviewed on 11/22/24    Patient reports feelings of depression and anxiety, racing thoughts, hopelessness, low-self esteem, and low energy. He states he slept \"well\" last night. He states his mood is \"depressed\". He states his depression is a 7.5/10 this morning. He states he is depressed and anxious due to the voices in his head constantly telling him they are going to kill him and his family and that he is going to hell. He states his anxiety is an 7/10 this morning. He states the voices are random people and not his own voice. He states he has been hearing this voices constantly for over 2 years. He states the voices have not said anything new recently. He states \"working out\" helps him cope with the voices and he used the punching bag yesterday which helped. He denies any visual hallucinations. He reports persecutory delusions in regards to the voices he is hearing but states he does not think anyone here on the unit is going to hurt him and he feels safe here. He denies grandiose, somatic, or bizarre delusions. He reports passive death wishes \"sometimes\" but states he does not have them currently. He denies thoughts of harm to self and others, SI, or HI.     Continue Clozapine 225 mg daily at bedtime for psychosis -- to consider further careful titration for Sxs depending on response  Continue to obtain CBC with diff, CRP, and CK (weekly on Tues), and a Clozapine level and BNP (every 2 weeks on Tues)- labs taken 11/19- WBC 10.3, CRP 11.2, Clozapine level 288    Continue Abilify 30 mg once daily for psychosis  Continue Escitalopram 20 mg once daily for depression and anxiety  Continue Senna-Docusate sodium one 50 mg tablet daily before bed for constipation  - decreased from two tablets daily on 11/3 due to reported loose stools  Continue propanolol 10 mg twice daily for anxiety   Continue amlodipine 5 mg PO daily     Pt remains on dual antipsychotic Tx due to failure on monotherapy      ACT team referral was " made  No associated orders from this encounter found during lookback period of 72 hours.

## 2024-11-22 NOTE — PLAN OF CARE
Problem: Alteration in Thoughts and Perception  Goal: Refrain from acting on delusional thinking/internal stimuli  Description: Interventions:  - Monitor patient closely, per order   - Utilize least restrictive measures   - Set reasonable limits, give positive feedback for acceptable   - Administer medications as ordered and monitor of potential side effects  Outcome: Progressing  Goal: Agree to be compliant with medication regime, as prescribed and report medication side effects  Description: Interventions:  - Offer appropriate PRN medication and supervise ingestion; conduct AIMS, as needed   Outcome: Progressing  Goal: Attend and participate in unit activities, including therapeutic, recreational, and educational groups  Description: Interventions:  -Encourage Visitation and family involvement in care  Outcome: Progressing  Goal: Complete daily ADLs, including personal hygiene independently, as able  Description: Interventions:  - Observe, teach, and assist patient with ADLS  - Monitor and promote a balance of rest/activity, with adequate nutrition and elimination   Outcome: Progressing     Problem: Ineffective Coping  Goal: Identifies healthy coping skills  Outcome: Progressing  Goal: Demonstrates healthy coping skills  Outcome: Progressing  Goal: Participates in unit activities  Description: Interventions:  - Provide therapeutic environment   - Provide required programming   - Redirect inappropriate behaviors   Outcome: Progressing     Problem: Depression  Goal: Refrain from harming self  Description: Interventions:  - Monitor patient closely, per order   - Supervise medication ingestion, monitor effects and side effects   Outcome: Progressing  Goal: Refrain from isolation  Description: Interventions:  - Develop a trusting relationship   - Encourage socialization   Outcome: Progressing  Goal: Refrain from self-neglect  Outcome: Progressing  Goal: Attend and participate in unit activities, including  therapeutic, recreational, and educational groups  Description: Interventions:  - Provide therapeutic and educational activities daily, encourage attendance and participation, and document same in the medical record   Outcome: Progressing     Problem: Anxiety  Goal: Anxiety is at manageable level  Description: Interventions:  - Assess and monitor patient's anxiety level.   - Monitor for signs and symptoms (heart palpitations, chest pain, shortness of breath, headaches, nausea, feeling jumpy, restlessness, irritable, apprehensive).   - Collaborate with interdisciplinary team and initiate plan and interventions as ordered.  - [place] patient to unit/surroundings  - Explain treatment plan  - Encourage participation in care  - Encourage verbalization of concerns/fears  - Identify coping mechanisms  - Assist in developing anxiety-reducing skills  - Administer/offer alternative therapies  - Limit or eliminate stimulants  Outcome: Progressing

## 2024-11-22 NOTE — NURSING NOTE
Pt in bed asleep, observed eyes closed, and chest movement noted. 15 minute safety checks maintained. No unmet needs at this time. Will continue to monitor patient needs, sleep pattern, and behaviors.     0606: Patient has slept all night, 7+ hours of uninterrupted sleep

## 2024-11-22 NOTE — NURSING NOTE
Patient visible on unit all evening, appeared internally preoccupied, communicated to tech hears voices, that he is afraid of death when gets discharged. Nursing extended self to patient and provided support; patient thankful, however did not want to open up to nurse, preferred expressing thoughts to staff tech. Patient believes staying here will cure his voices. Patient behaviors controlled and compliant with bedtime meds. Will continue to monitor.

## 2024-11-22 NOTE — ASSESSMENT & PLAN NOTE
Reviewed on 11/22/24  Continue to encourage cessation upon discharge  No associated orders from this encounter found during lookback period of 72 hours.

## 2024-11-22 NOTE — PLAN OF CARE
Problem: Ineffective Coping  Goal: Identifies ineffective coping skills  Outcome: Progressing  Goal: Identifies healthy coping skills  Outcome: Progressing  Goal: Demonstrates healthy coping skills  Outcome: Progressing  Goal: Participates in unit activities  Description: Interventions:  - Provide therapeutic environment   - Provide required programming   - Redirect inappropriate behaviors   Outcome: Progressing   He continues to work towards the goal by attending groups and actively use his coping skills.

## 2024-11-22 NOTE — ASSESSMENT & PLAN NOTE
Reviewed on 11/22/24  Follows with medical  Continue famotidine 20 mg tablet BID per medical team   Continue glycopyrrolate 1 mg tablet BID AM and afternoon per medical team  Continue glycopyrrolate 2 mg tablet before bed per medical team  Continue pantoprazole EC 20 mg tablet every morning per medical team  No associated orders from this encounter found during lookback period of 72 hours.

## 2024-11-22 NOTE — PROGRESS NOTES
"Progress Note - Behavioral Health   Name: Alberto Berumen 29 y.o. male I MRN: [id number]  Unit/Bed#: [facility] 112-02 I Date of Admission: 3/29/2024   Date of Service: 11/22/2024 I Hospital Day: 238     Assessment & Plan  Schizoaffective disorder, bipolar type (HCC)  Reviewed on 11/22/24    Patient reports feelings of depression and anxiety, racing thoughts, hopelessness, low-self esteem, and low energy. He states he slept \"well\" last night. He states his mood is \"depressed\". He states his depression is a 7.5/10 this morning. He states he is depressed and anxious due to the voices in his head constantly telling him they are going to kill him and his family and that he is going to hell. He states his anxiety is an 7/10 this morning. He states the voices are random people and not his own voice. He states he has been hearing this voices constantly for over 2 years. He states the voices have not said anything new recently. He states \"working out\" helps him cope with the voices and he used the punching bag yesterday which helped. He denies any visual hallucinations. He reports persecutory delusions in regards to the voices he is hearing but states he does not think anyone here on the unit is going to hurt him and he feels safe here. He denies grandiose, somatic, or bizarre delusions. He reports passive death wishes \"sometimes\" but states he does not have them currently. He denies thoughts of harm to self and others, SI, or HI.     Continue Clozapine 225 mg daily at bedtime for psychosis -- to consider further careful titration for Sxs depending on response  Continue to obtain CBC with diff, CRP, and CK (weekly on Tues), and a Clozapine level and BNP (every 2 weeks on Tues)- labs taken 11/19- WBC 10.3, CRP 11.2, Clozapine level 288    Continue Abilify 30 mg once daily for psychosis  Continue Escitalopram 20 mg once daily for depression and anxiety  Continue Senna-Docusate sodium one 50 mg tablet daily before bed for " constipation  - decreased from two tablets daily on 11/3 due to reported loose stools  Continue propanolol 10 mg twice daily for anxiety   Continue amlodipine 5 mg PO daily     Pt remains on dual antipsychotic Tx due to failure on monotherapy      ACT team referral was made  No associated orders from this encounter found during lookback period of 72 hours.    Chronic idiopathic constipation  Reviewed on 11/22/24  Follows with medical  Continue senna-docusate sodium dose per medical team.   No associated orders from this encounter found during lookback period of 72 hours.  GERD (gastroesophageal reflux disease)  Reviewed on 11/22/24  Follows with medical  Continue famotidine 20 mg tablet BID per medical team   Continue glycopyrrolate 1 mg tablet BID AM and afternoon per medical team  Continue glycopyrrolate 2 mg tablet before bed per medical team  Continue pantoprazole EC 20 mg tablet every morning per medical team  No associated orders from this encounter found during lookback period of 72 hours.  Medical clearance for psychiatric admission  Reviewed on 11/22/24  Ongoing by medical  No associated orders from this encounter found during lookback period of 72 hours.    Tobacco abuse  Reviewed on 11/22/24  Continue to encourage cessation upon discharge  No associated orders from this encounter found during lookback period of 72 hours.  Elevated hemoglobin A1c  Reviewed on 11/22/24  Follows with medical  No associated orders from this encounter found during lookback period of 72 hours.  Class 2 obesity in adult  Reviewed on 11/22/24  Follows with medical  No associated orders from this encounter found during lookback period of 72 hours.    Primary hypertension  Reviewed on 11/22/24  Follows with medical  No associated orders from this encounter found during lookback period of 72 hours.    Psychiatry Progress Note [facility]    Progress towards goals: Progressing    Review of systems:  Unremarkable    Psychiatric Diagnosis: Schizoaffective disorder, bipolar type    Assessment  Overall Status:  Status quo- continues to feel anxious and depressed regarding threatening voices  Certification Statement: The patient will continue to require additional inpatient hospital stay due to Schizoaffective disorder, bipolar type and related symptoms       Medications: as above  Side effects from treatment: Denied  Medication changes: as above  Medication education: Risks side effects benefits and precautions of medications discussed with patient and they did verbalize an understanding about risks for metabolic syndrome from being on neuroleptics and tardive dyskinesia etc.  All medications reviewed and I recommend that they be continued for symptom management  Understanding of medications: Risks side effects benefits and precautions of medications discussed with patient and they did verbalize an understanding about risks for metabolic syndrome from being on neuroleptics and tardive dyskinesia etc., and patient appeared to have understanding.  Justification for dual anti-psychotics: Due to failure on monotherapy    Non-pharmacological treatments  Continue with individual, group, milieu and occupational therapy using recovery principles and psycho-education about accepting illness and the need for treatment.  Behavioral health checks every 7 minutes    Safety  Safety and communication plan established to target dynamic risk factors discussed above.    Discharge Plan   To live with aunt once [facility] approves funding on ACT team    Interval Progress: Per treatment team, patient is compliant with meals and medications. He is pleasant, cooperative, and social with peers on unit. He reported to team yesterday that he feels safe here and thinks the voices will be cured here. He used the punching bag yesterday to help cope with the voices. He did not require any PRN psychotropic medications yesterday or this morning. He  "attended 8/10 groups yesterday.   Today, patient evaluated by this writer and Dr. Rosario in [facility]. Patient states his mood is \"depressed\" due to the threatening voices he constantly hears telling him they are going to kill him and his family and that he is going to hell. He states his depression is a 7.5/10 today. He states he has been hearing the voices constantly for the past 2 years and he knows he needs to continue to find ways to cope with them. He states \"working out\" helps him cope and he used the punching bag yesterday which helped. He denies visual hallucinations. He reports persecutory delusions regarding the threatening voices he hears but states he does not think anyone here on the unit is going to hurt him and he feels safe here. He denies grandiose, somatic, or bizarre delusions. He reports feelings of depression, anxiety, hopelessness, racing thoughts, low energy, and low self-esteem. He states his anxiety is a 7/10 this morning. He reports passive death wishes \"sometimes\" but denies any currently. He denies any thoughts to harm self or others, SI, or HI. He states he slept \"well\" last night but is still feeling tired today. He states he is eating well and has a good appetite. He is trying to lose weight through working out. He denies any adverse medication side effects.    Acceptance by patient: Accepting of medications and treatments  Hopefulness in recovery: Hopeful about living with his aunt  Involved in reintegration process: Patient communication with his sister and aunt on phone  Trusting in relationship with psychiatrist: Present  Sleep: Good  Appetite: Good  Compliance with Medications: Compliant  Group attendance: 8/10 groups yesterday  Significant events: None in the past 24 hours    Mental Status Exam  Appearance: age appropriate, casually dressed, dressed appropriately, little disheveled  Behavior: cooperative, calm, good  eye contact  Speech: normal rate, normal volume, normal pitch, " "not pressured  Mood: \"depressed\"  Affect: constricted, mood-congruent  Thought Process: organized, logical, coherent, goal directed, linear  Thought Content: Mild paranoia regarding threatening voices  Perceptual Disturbances: Auditory hallucinations of voices telling him they are going to kill him and his family and that he is going to hell. Denies visual hallucinations when asked. Does not appear to be responding to internal stimuli  Sensorium: alert and oriented to person, place, time and situation  Cognition: recent and remote memory grossly intact  Consciousness: alert and awake  Attention: attention span and concentration are age appropriate  Intellect: appears to be of average intelligence  Insight: fair  Judgement: fair  Motor Activity: no abnormal movements     Vitals  Temp:  [97.7 °F (36.5 °C)-97.9 °F (36.6 °C)] 97.9 °F (36.6 °C)  HR:  [] 93  Resp:  [18] 18  BP: (110-120)/(65-69) 120/66  SpO2:  [98 %-100 %] 98 %  No intake or output data in the 24 hours ending 11/22/24 1107    Lab Results: All Labs For Current Hospital Admission Reviewed  Results from last 7 days   Lab Units 11/19/24  0526   WBC Thousand/uL 10.30*   RBC Million/uL 5.42   HEMOGLOBIN g/dL 12.6   HEMATOCRIT % 42.5   MCV fL 78*   PLATELETS Thousands/uL 253   TOTAL NEUT ABS Thousands/µL 4.94   CLOZAPINE LVL ng/mL 288*       Current Facility-Administered Medications   Medication Dose Route Frequency Provider Last Rate    acetaminophen  650 mg Oral Q4H PRN Jordan C Holter, DO      acetaminophen  650 mg Oral Q6H PRN HOLLI Lion      aluminum-magnesium hydroxide-simethicone  30 mL Oral Q4H PRN Jordan C Holter, DO      amLODIPine  5 mg Oral Daily HOLLI Lion      ARIPiprazole  30 mg Oral Daily Bora Rosario MD      Artificial Tears  1 drop Both Eyes Q3H PRN Jordan C Holter, DO      atropine  1 drop Sublingual HS Harjeet Torres, DO      haloperidol lactate  2.5 mg Intramuscular Q4H PRN Max 4/day HOLLI Lion      And    " LORazepam  1 mg Intramuscular Q4H PRN Max 4/day HOLLI Lion      And    benztropine  0.5 mg Intramuscular Q4H PRN Max 4/day HOLLI Lion      benztropine  1 mg Intramuscular Q4H PRN Max 6/day Jordan C Holter, DO      haloperidol lactate  5 mg Intramuscular Q4H PRN Max 4/day HOLLI Lion      And    LORazepam  2 mg Intramuscular Q4H PRN Max 4/day HOLLI Lion      And    benztropine  1 mg Intramuscular Q4H PRN Max 4/day HOLLI Lion      benztropine  1 mg Oral Q4H PRN Max 6/day HOLLI Lion      benztropine  1 mg Oral Q4H PRN Max 6/day Jordan C Holter, DO      bisacodyl  10 mg Rectal Daily PRN HOLLI Lion      calcium carbonate  500 mg Oral BID PRN HOLLI Monge      cloZAPine  225 mg Oral HS Hardik So MD      hydrOXYzine HCL  50 mg Oral Q6H PRN Max 4/day Jordan C Holter, DO      Or    diphenhydrAMINE  50 mg Intramuscular Q6H PRN Jordan C Holter, DO      hydrOXYzine HCL  50 mg Oral Q6H PRN Max 4/day HOLLI Lion      Or    diphenhydrAMINE  50 mg Intramuscular Q6H PRN HOLLI Lion      diphenhydrAMINE-zinc acetate   Topical BID PRN HOLLI Lion      docusate sodium  100 mg Oral BID Jourdan Dickens,       escitalopram  20 mg Oral Daily HOLLI Lion      famotidine  20 mg Oral BID PRN HOLLI Monge      fluticasone  1 spray Each Nare Daily PRN HOLLI Monge      glycopyrrolate  1 mg Oral BID (AM & Afternoon) Bora Rosario MD      glycopyrrolate  2 mg Oral HS Bora Rosario MD      haloperidol  1 mg Oral Q6H PRN HOLLI Lion      haloperidol  2.5 mg Oral Q4H PRN Max 4/day HOLLI Lion      haloperidol  5 mg Oral Q4H PRN Max 4/day HOLLI Lion      hydroCHLOROthiazide  12.5 mg Oral Daily HOLLI Galvan      hydrocortisone   Topical 4x Daily PRN HOLLI Lion      hydrOXYzine HCL  100 mg Oral Q6H PRN Max 4/day Jordan C Holter, DO      Or    LORazepam  2 mg  Intramuscular Q6H PRN [facility] Holter, DO      hydrOXYzine HCL  100 mg Oral Q6H PRN Max 4/day HOLLI Lion      Or    LORazepam  2 mg Intramuscular Q6H PRN HOLLI Lion      hydrOXYzine HCL  25 mg Oral Q6H PRN Max 4/day [facility] Holter, DO      ibuprofen  600 mg Oral Q8H PRN HOLLI Lion      influenza vaccine  0.5 mL Intramuscular Once Bora Rosario MD      loratadine  10 mg Oral Daily Brina Guillen MD      magnesium hydroxide  30 mL Oral Once Jourdan Dickens, DO      melatonin  3 mg Oral HS PRN Bora Rosario MD      melatonin  9 mg Oral HS Bora Rosario MD      methocarbamol  500 mg Oral Q6H PRN HOLLI Lion      multivitamin-minerals  1 tablet Oral Daily Eileen CherryHOLLI Jimenez      nicotine polacrilex  2 mg Oral Q4H PRN Bora Rosario MD      OLANZapine  5 mg Oral Q4H PRN Max 3/day [facility] Holter, DO      Or    OLANZapine  2.5 mg Intramuscular Q4H PRN Max 3/day [facility] Holter, DO      OLANZapine  5 mg Oral Q3H PRN Max 3/day [facility] Holter, DO      Or    OLANZapine  5 mg Intramuscular Q3H PRN Max 3/day [facility] Holter, DO      OLANZapine  2.5 mg Oral Q4H PRN Max 6/day [facility] Holter, DO      ondansetron  4 mg Oral Q6H PRN HOLLI Lion      [facility]oprazole  [facility]      polyethylene glycol  17 g Oral Daily PRN HOLLI Ghotra      polyethylene glycol  17 g Oral Daily Jourdan Dickens, DO      propranolol  10 mg Oral Q12H KAYLIE HOLLI Lion      senna-docusate sodium  1 tablet Oral Daily PRN [facility] Cresencioter, DO      senna-docusate sodium  2 tablet Oral HS Jourdan Dickens, DO      traZODone  50 mg Oral HS PRN HOLLI Lion      white petrolatum-mineral oil   Topical TID PRN HOLLI Lion         Counseling / Coordination of Care: Total floor / unit time spent today 15 minutes. Greater than 50% of total time was spent with the patient and / or family counseling and / or somewhat receptive to supportive listening and teaching positive  coping skills to deal with symptom mangement.     Patient's Rights, confidentiality and exceptions to confidentiality, use of automated medical record, Behavioral Health Services staff access to medical record, and consent to treatment reviewed.    This note has been dictated and hence there may be problems with punctuation, spelling and formatting and if anyone has any concerns please address them to Dr. Mora.  This note is not shared with patient due to potential for making patient's condition worse by knowing the content of the note.    VERONICA FloresS

## 2024-11-22 NOTE — NURSING NOTE
Alberto reported feeling depressed and anxious on this shift.  Reported that he hears voices today.  They are still telling him that they are going to kill him and his family.    Pt is visible, social, pleasant and cooperative.  Requested gum throughout the shift.  No unmet needs.  No behavioral issues noted or reported.

## 2024-11-22 NOTE — ASSESSMENT & PLAN NOTE
Reviewed on 11/22/24  Follows with medical  No associated orders from this encounter found during lookback period of 72 hours.

## 2024-11-23 PROCEDURE — 99232 SBSQ HOSP IP/OBS MODERATE 35: CPT

## 2024-11-23 RX ADMIN — LORATADINE 10 MG: 10 TABLET ORAL at 08:18

## 2024-11-23 RX ADMIN — ESCITALOPRAM OXALATE 20 MG: 10 TABLET, FILM COATED ORAL at 08:18

## 2024-11-23 RX ADMIN — DOCUSATE SODIUM 100 MG: 100 CAPSULE, LIQUID FILLED ORAL at 08:18

## 2024-11-23 RX ADMIN — DOCUSATE SODIUM 100 MG: 100 CAPSULE, LIQUID FILLED ORAL at 17:15

## 2024-11-23 RX ADMIN — NICOTINE POLACRILEX 2 MG: 2 GUM, CHEWING ORAL at 17:15

## 2024-11-23 RX ADMIN — GLYCOPYRROLATE 1 MG: 1 TABLET ORAL at 13:16

## 2024-11-23 RX ADMIN — NICOTINE POLACRILEX 2 MG: 2 GUM, CHEWING ORAL at 13:16

## 2024-11-23 RX ADMIN — PROPRANOLOL HYDROCHLORIDE 10 MG: 10 TABLET ORAL at 21:19

## 2024-11-23 RX ADMIN — PANTOPRAZOLE SODIUM 20 MG: 20 TABLET, DELAYED RELEASE ORAL at 08:18

## 2024-11-23 RX ADMIN — PROPRANOLOL HYDROCHLORIDE 10 MG: 10 TABLET ORAL at 08:18

## 2024-11-23 RX ADMIN — HYDROCHLOROTHIAZIDE 12.5 MG: 12.5 TABLET ORAL at 08:17

## 2024-11-23 RX ADMIN — CLOZAPINE 225 MG: 25 TABLET ORAL at 21:19

## 2024-11-23 RX ADMIN — GLYCOPYRROLATE 2 MG: 1 TABLET ORAL at 21:18

## 2024-11-23 RX ADMIN — AMLODIPINE BESYLATE 5 MG: 5 TABLET ORAL at 08:17

## 2024-11-23 RX ADMIN — MELATONIN TAB 3 MG 9 MG: 3 TAB at 21:18

## 2024-11-23 RX ADMIN — MULTIPLE VITAMINS W/ MINERALS TAB 1 TABLET: TAB ORAL at 08:18

## 2024-11-23 RX ADMIN — ARIPIPRAZOLE 30 MG: 15 TABLET ORAL at 08:17

## 2024-11-23 RX ADMIN — SENNOSIDES AND DOCUSATE SODIUM 2 TABLET: 50; 8.6 TABLET ORAL at 21:18

## 2024-11-23 RX ADMIN — ATROPINE SULFATE 1 DROP: 10 SOLUTION/ DROPS OPHTHALMIC at 22:03

## 2024-11-23 RX ADMIN — NICOTINE POLACRILEX 2 MG: 2 GUM, CHEWING ORAL at 08:20

## 2024-11-23 RX ADMIN — GLYCOPYRROLATE 1 MG: 1 TABLET ORAL at 08:18

## 2024-11-23 RX ADMIN — NICOTINE POLACRILEX 2 MG: 2 GUM, CHEWING ORAL at 21:19

## 2024-11-23 NOTE — ASSESSMENT & PLAN NOTE
Reviewed on 11/23/24  Ongoing by medical  No associated orders from this encounter found during lookback period of 72 hours.

## 2024-11-23 NOTE — ASSESSMENT & PLAN NOTE
Reviewed on 11/23/24  Follows with medical  No associated orders from this encounter found during lookback period of 72 hours.

## 2024-11-23 NOTE — PLAN OF CARE
Problem: Alteration in Thoughts and Perception  Goal: Agree to be compliant with medication regime, as prescribed and report medication side effects  Description: Interventions:  - Offer appropriate PRN medication and supervise ingestion; conduct AIMS, as needed   Outcome: Progressing  Goal: Complete daily ADLs, including personal hygiene independently, as able  Description: Interventions:  - Observe, teach, and assist patient with ADLS  - Monitor and promote a balance of rest/activity, with adequate nutrition and elimination   Outcome: Progressing     Problem: Depression  Goal: Refrain from isolation  Description: Interventions:  - Develop a trusting relationship   - Encourage socialization   Outcome: Progressing  Goal: Refrain from self-neglect  Outcome: Progressing  Goal: Attend and participate in unit activities, including therapeutic, recreational, and educational groups  Description: Interventions:  - Provide therapeutic and educational activities daily, encourage attendance and participation, and document same in the medical record   Outcome: Progressing     Problem: Anxiety  Goal: Anxiety is at manageable level  Description: Interventions:  - Assess and monitor patient's anxiety level.   - Monitor for signs and symptoms (heart palpitations, chest pain, shortness of breath, headaches, nausea, feeling jumpy, restlessness, irritable, apprehensive).   - Collaborate with interdisciplinary team and initiate plan and interventions as ordered.  - [place] patient to unit/surroundings  - Explain treatment plan  - Encourage participation in care  - Encourage verbalization of concerns/fears  - Identify coping mechanisms  - Assist in developing anxiety-reducing skills  - Administer/offer alternative therapies  - Limit or eliminate stimulants  Outcome: Progressing     Problem: Alteration in Orientation  Goal: Treatment Goal: Demonstrate a reduction of confusion and improved orientation to person, place, time and/or  situation upon discharge, according to optimum baseline/ability  Outcome: Progressing  Goal: Interact with staff daily  Description: Interventions:  - Assess and re-assess patient's level of orientation  - Engage patient in 1 on 1 interactions, daily, for a minimum of 15 minutes   - Establish rapport/trust with patient   Outcome: Progressing  Goal: Express concerns related to confused thinking related to:  Description: Interventions:  - Encourage patient to express feelings, fears, frustrations, hopes  - Assign consistent caregivers   - [place]/re-orient patient as needed  - Allow comfort items, as appropriate  - Provide visual cues, signs, etc.   Outcome: Progressing  Goal: Allow medical examinations, as recommended  Description: Interventions:  - Provide physical/neurological exams and/or referrals, per provider   Outcome: Progressing     Problem: Alteration in Thoughts and Perception  Goal: Treatment Goal: Gain control of psychotic behaviors/thinking, reduce/eliminate presenting symptoms and demonstrate improved reality functioning upon discharge  Outcome: Not Progressing

## 2024-11-23 NOTE — ASSESSMENT & PLAN NOTE
Reviewed on 11/23/24  Follows with medical  Continue famotidine 20 mg tablet BID per medical team   Continue glycopyrrolate 1 mg tablet BID AM and afternoon per medical team  Continue glycopyrrolate 2 mg tablet before bed per medical team  Continue pantoprazole EC 20 mg tablet every morning per medical team  No associated orders from this encounter found during lookback period of 72 hours.

## 2024-11-23 NOTE — PROGRESS NOTES
Progress Note - Behavioral Health     Alberto Berumen 29 y.o. male MRN: [id number]   Unit/Bed#: [facility] 112-02 Encounter: [id number]  Assessment & Plan  Schizoaffective disorder, bipolar type (HCC)  Reviewed on 11/23/24    Alberto reports anxiety that he rates a 9/10 on the severity scale with 10 being the most severe.  He also reports depression that he rates a 10/10 on the severity scale with 10 being the most severe.  He states he has the increase in anxiety and depression because of the voices.  He reports auditory hallucinations of voices telling him to go to kill him.  He denies that they are commanding in nature.  Denies VH, SI, or HI.  No behaviors.    Continue Clozapine 225 mg daily at bedtime for psychosis -- to consider further careful titration for Sxs depending on response  Continue to obtain CBC with diff, CRP, and CK (weekly on Tues), and a Clozapine level and BNP (every 2 weeks on Tues)- labs taken 11/19- WBC 10.3, CRP 11.2, Clozapine level 288    Continue Abilify 30 mg once daily for psychosis  Continue Escitalopram 20 mg once daily for depression and anxiety  Continue Senna-Docusate sodium one 50 mg tablet daily before bed for constipation  - decreased from two tablets daily on 11/3 due to reported loose stools  Continue propanolol 10 mg twice daily for anxiety   Continue amlodipine 5 mg PO daily     Pt remains on dual antipsychotic Tx due to failure on monotherapy      ACT team referral was made  No associated orders from this encounter found during lookback period of 72 hours.    Chronic idiopathic constipation  Reviewed on 11/23/24  Follows with medical  Continue senna-docusate sodium dose per medical team.   No associated orders from this encounter found during lookback period of 72 hours.  GERD (gastroesophageal reflux disease)  Reviewed on 11/23/24  Follows with medical  Continue famotidine 20 mg tablet BID per medical team   Continue glycopyrrolate 1 mg tablet BID AM and afternoon per medical  team  Continue glycopyrrolate 2 mg tablet before bed per medical team  Continue pantoprazole EC 20 mg tablet every morning per medical team  No associated orders from this encounter found during lookback period of 72 hours.  Medical clearance for psychiatric admission  Reviewed on 11/23/24  Ongoing by medical  No associated orders from this encounter found during lookback period of 72 hours.    Tobacco abuse  Reviewed on 11/23/24  Continue to encourage cessation upon discharge  No associated orders from this encounter found during lookback period of 72 hours.  Elevated hemoglobin A1c  Reviewed on 11/23/24  Follows with medical  No associated orders from this encounter found during lookback period of 72 hours.  Class 2 obesity in adult  Reviewed on 11/23/24  Follows with medical  No associated orders from this encounter found during lookback period of 72 hours.    Primary hypertension  Reviewed on 11/23/24  Follows with medical  No associated orders from this encounter found during lookback period of 72 hours.       Recommended Treatment:     Planned medication and treatment changes:    All current active medications have been reviewed  Encourage group therapy, milieu therapy and occupational therapy  Behavioral Health checks for safety monitoring  Continue current medications:    Current medications:  Current Facility-Administered Medications   Medication Dose Route Frequency Provider Last Rate    acetaminophen  650 mg Oral Q4H PRN Jordan C Holter, DO      acetaminophen  650 mg Oral Q6H PRN HOLLI Lion      aluminum-magnesium hydroxide-simethicone  30 mL Oral Q4H PRN Jordan C Holter, DO      amLODIPine  5 mg Oral Daily HOLLI Lion      ARIPiprazole  30 mg Oral Daily Bora Rosario MD      Artificial Tears  1 drop Both Eyes Q3H PRN Jordan C Holter, DO      atropine  1 drop Sublingual HS Harjeet Torres DO      haloperidol lactate  2.5 mg Intramuscular Q4H PRN Max 4/day HOLLI Lion      And     LORazepam  1 mg Intramuscular Q4H PRN Max 4/day HOLLI Lion      And    benztropine  0.5 mg Intramuscular Q4H PRN Max 4/day HOLLI Lion      benztropine  1 mg Intramuscular Q4H PRN Max 6/day Jordan C Holter, DO      haloperidol lactate  5 mg Intramuscular Q4H PRN Max 4/day HOLLI Lion      And    LORazepam  2 mg Intramuscular Q4H PRN Max 4/day HOLLI Lion      And    benztropine  1 mg Intramuscular Q4H PRN Max 4/day HOLLI Lion      benztropine  1 mg Oral Q4H PRN Max 6/day HOLLI Lion      benztropine  1 mg Oral Q4H PRN Max 6/day Jordan C Holter, DO      bisacodyl  10 mg Rectal Daily PRN HOLLI Lion      calcium carbonate  500 mg Oral BID PRN HOLLI Monge      cloZAPine  225 mg Oral HS Hardik So MD      hydrOXYzine HCL  50 mg Oral Q6H PRN Max 4/day Jordan C Holter, DO      Or    diphenhydrAMINE  50 mg Intramuscular Q6H PRN Jordan C Holter, DO      hydrOXYzine HCL  50 mg Oral Q6H PRN Max 4/day HOLLI Lion      Or    diphenhydrAMINE  50 mg Intramuscular Q6H PRN HOLLI Lion      diphenhydrAMINE-zinc acetate   Topical BID PRN HOLLI Lion      docusate sodium  100 mg Oral BID Jourdan Dickens,       escitalopram  20 mg Oral Daily HOLLI Lion      famotidine  20 mg Oral BID PRN HOLLI Monge      fluticasone  1 spray Each Nare Daily PRN HOLLI Monge      glycopyrrolate  1 mg Oral BID (AM & Afternoon) Bora Rosario MD      glycopyrrolate  2 mg Oral HS Bora Rosario MD      haloperidol  1 mg Oral Q6H PRN HOLLI Lion      haloperidol  2.5 mg Oral Q4H PRN Max 4/day HOLLI Lion      haloperidol  5 mg Oral Q4H PRN Max 4/day HOLLI Lion      hydroCHLOROthiazide  12.5 mg Oral Daily OHLLI Galvan      hydrocortisone   Topical 4x Daily PRN HOLLI Lion      hydrOXYzine HCL  100 mg Oral Q6H PRN Max 4/day Jordan C Holter, DO      Or    LORazepam  2 mg  Intramuscular Q6H PRN Ion C Holter, DO      hydrOXYzine HCL  100 mg Oral Q6H PRN Max 4/day HOLLI Lion      Or    LORazepam  2 mg Intramuscular Q6H PRN HOLLI Lion      hydrOXYzine HCL  25 mg Oral Q6H PRN Max 4/day [facility] Holter, DO      ibuprofen  600 mg Oral Q8H PRN HOLLI Lion      influenza vaccine  0.5 mL Intramuscular Once Bora Rosario MD      loratadine  10 mg Oral Daily Brina Guillen MD      magnesium hydroxide  30 mL Oral Once Jourdan Dickens, DO      melatonin  3 mg Oral HS PRN Bora Rosario MD      melatonin  9 mg Oral HS Bora Rosario MD      methocarbamol  500 mg Oral Q6H PRN HOLLI Lion      multivitamin-minerals  1 tablet Oral Daily Eileen CherryHOLLI Jimenez      nicotine polacrilex  2 mg Oral Q4H PRN Bora Rosario MD      OLANZapine  5 mg Oral Q4H PRN Max 3/day [facility] Holter, DO      Or    OLANZapine  2.5 mg Intramuscular Q4H PRN Max 3/day [facility] Holter, DO      OLANZapine  5 mg Oral Q3H PRN Max 3/day [facility] Holter, DO      Or    OLANZapine  5 mg Intramuscular Q3H PRN Max 3/day [facility] Holter, DO      OLANZapine  2.5 mg Oral Q4H PRN Max 6/day [facility] Holter, DO      ondansetron  4 mg Oral Q6H PRN HOLLI Lion      pantoprazole  [facility] Hol[facility]      polyethylene glycol  17 g Oral Daily PRN HOLLI Ghotra      polyethylene glycol  17 g Oral Daily Jourdan Dickens, DO      propranolol  10 mg Oral Q12H KAYLIE HOLLI Loin      senna-docusate sodium  1 tablet Oral Daily PRN [facility] Cresencioter, DO      senna-docusate sodium  2 tablet Oral HS Jourdan Dickens, DO      traZODone  50 mg Oral HS PRN HOLLI Lion      white petrolatum-mineral oil   Topical TID PRN HOLLI Lion         Risks / Benefits of Treatment:    Risks, benefits, and possible side effects of medications explained to patient and patient verbalizes understanding and agreement for treatment.    Subjective:    Patient was seen today for continuation of  care, records reviewed and patient was discussed with the morning case review team.  Per nursing report, Alberto was sad about a peer on the unit that passed away and utilize the punching bag to cope.  Continues to have chronic voices that state they are going to kill him.  Participated in 6/8 of the groups.  Last BM and shower reported on 11/22/2024.  CBC with differential ordered for 11/26/2024.  No behaviors.  No PRNs administered    Alberto was seen today for psychiatric follow-up. On assessment today, Alberto was lying in bed.  He appeared slightly guarded but was cooperative with assessment.  Alberto reports anxiety which he rates a 9/10 on the severity scale with 10 being the most severe and depression a 10/10 on the severity scale with 10 being the most severe.  He states the increase in anxiety and depression are due to the voices.  Alberto denies acute suicidal/self-harm ideation/intent/plan upon direct inquiry at this time.  Alberto remains behaviorally appropriate, no agitation or aggression noted on exam or in report.  Alberto also denies HI/VH, and continues to report auditory hallucinations of voices telling him they are going to kill him.  He denies that they are commanding in nature.  Alberto does not appear overtly manic.  No overt delusions or paranoia are verbalized. Alberto remains adherent to his current psychotropic medication regimen and denies any side effects from medications, as well as none noted on exam.  Sleep and appetite remain adequate.  He does not report any pain at this time.    Behavior over the last 24 hours: unchanged.     Sleep: normal  Appetite: normal  Medication side effects: No   ROS: no complaints    Mental Status Evaluation:    Appearance:  disheveled   Behavior:  cooperative, calm, guarded   Speech:  scant, soft   Mood:  depressed, anxious   Affect:  blunted   Thought Process:  coherent, linear   Associations: intact associations   Thought Content:  no overt delusions    Perceptual Disturbances: no visual hallucinations, does not appear responding to internal stimuli, auditory hallucinations voices stating will kill him, Denies commanding in nature   Risk Potential: Suicidal ideation - None at present  Homicidal ideation - None at present  Potential for aggression - Not at present   Sensorium:  oriented to person, place, and time/date   Memory:  recent and remote memory grossly intact   Consciousness:  alert and awake   Attention/Concentration: attention span and concentration appear shorter than expected for age   Insight:  fair   Judgment: fair   Gait/Station: in bed   Motor Activity: no abnormal movements     Vital signs in last 24 hours:    Temp:  [97.8 °F (36.6 °C)] 97.8 °F (36.6 °C)  HR:  [] 87  BP: (117-128)/(69-77) 125/77  Resp:  [18] 18  SpO2:  [96 %-98 %] 98 %  O2 Device: None (Room air)    Laboratory results: I have personally reviewed all pertinent laboratory/tests results    Results from the past 24 hours: No results found for this or any previous visit (from the past 24 hours).    Suicide/Homicide Risk Assessment:    Risk of Harm to Self:   Nursing Suicide Risk Assessment Last 24 hours: C-SSRS Risk (Since Last Contact)  Calculated C-SSRS Risk Score (Since Last Contact): No Risk Indicated    Risk of Harm to Others:  Nursing Homicide Risk Assessment: Violence Risk to Others: Denies within past 6 months    The following interventions are recommended: Behavioral Health checks for safety monitoring    Progress Toward Goals: progressing    Counseling / Coordination of Care:    Total floor / unit time spent today 15 minutes. Greater than 50% of total time was spent with the patient and / or family counseling and / or coordination of care. A description of counseling / coordination of care:    HOLLI Stoddard 11/23/24     This note was not shared with the patient due to reasonable likelihood of causing patient harm

## 2024-11-23 NOTE — NURSING NOTE
Patient requested Nicorette gum at this time. Patient visible most of the evening. Social with select female peer. Cooperative and pleasant upon approach.  Behaviors controlled and appropriate. Polite when interacting with staff. Comes to staff to help make his needs known.

## 2024-11-23 NOTE — ASSESSMENT & PLAN NOTE
Reviewed on 11/23/24  Continue to encourage cessation upon discharge  No associated orders from this encounter found during lookback period of 72 hours.

## 2024-11-23 NOTE — NURSING NOTE
Pt is calm and cooperative, visible on the unit, social with peers and staff. Pt refused breakfast, had 100% for lunch. Pt continues to report AH, denies SI/HI/VH. Pt is medication compliant, safety checks ongoing.

## 2024-11-23 NOTE — ASSESSMENT & PLAN NOTE
Reviewed on 11/23/24    Alberto reports anxiety that he rates a 9/10 on the severity scale with 10 being the most severe.  He also reports depression that he rates a 10/10 on the severity scale with 10 being the most severe.  He states he has the increase in anxiety and depression because of the voices.  He reports auditory hallucinations of voices telling him to go to kill him.  He denies that they are commanding in nature.  Denies VH, SI, or HI.  No behaviors.    Continue Clozapine 225 mg daily at bedtime for psychosis -- to consider further careful titration for Sxs depending on response  Continue to obtain CBC with diff, CRP, and CK (weekly on Tues), and a Clozapine level and BNP (every 2 weeks on Tues)- labs taken 11/19- WBC 10.3, CRP 11.2, Clozapine level 288    Continue Abilify 30 mg once daily for psychosis  Continue Escitalopram 20 mg once daily for depression and anxiety  Continue Senna-Docusate sodium one 50 mg tablet daily before bed for constipation  - decreased from two tablets daily on 11/3 due to reported loose stools  Continue propanolol 10 mg twice daily for anxiety   Continue amlodipine 5 mg PO daily     Pt remains on dual antipsychotic Tx due to failure on monotherapy      ACT team referral was made  No associated orders from this encounter found during lookback period of 72 hours.

## 2024-11-23 NOTE — ASSESSMENT & PLAN NOTE
Reviewed on 11/23/24  Follows with medical  Continue senna-docusate sodium dose per medical team.   No associated orders from this encounter found during lookback period of 72 hours.

## 2024-11-24 PROCEDURE — 99232 SBSQ HOSP IP/OBS MODERATE 35: CPT

## 2024-11-24 RX ADMIN — NICOTINE POLACRILEX 2 MG: 2 GUM, CHEWING ORAL at 17:22

## 2024-11-24 RX ADMIN — DOCUSATE SODIUM 100 MG: 100 CAPSULE, LIQUID FILLED ORAL at 17:03

## 2024-11-24 RX ADMIN — NICOTINE POLACRILEX 2 MG: 2 GUM, CHEWING ORAL at 08:41

## 2024-11-24 RX ADMIN — GLYCOPYRROLATE 2 MG: 1 TABLET ORAL at 21:26

## 2024-11-24 RX ADMIN — NICOTINE POLACRILEX 2 MG: 2 GUM, CHEWING ORAL at 12:47

## 2024-11-24 RX ADMIN — SENNOSIDES AND DOCUSATE SODIUM 2 TABLET: 50; 8.6 TABLET ORAL at 21:27

## 2024-11-24 RX ADMIN — PANTOPRAZOLE SODIUM 20 MG: 20 TABLET, DELAYED RELEASE ORAL at 08:39

## 2024-11-24 RX ADMIN — MULTIPLE VITAMINS W/ MINERALS TAB 1 TABLET: TAB ORAL at 08:39

## 2024-11-24 RX ADMIN — ARIPIPRAZOLE 30 MG: 15 TABLET ORAL at 08:39

## 2024-11-24 RX ADMIN — LORATADINE 10 MG: 10 TABLET ORAL at 08:38

## 2024-11-24 RX ADMIN — GLYCOPYRROLATE 1 MG: 1 TABLET ORAL at 08:39

## 2024-11-24 RX ADMIN — PROPRANOLOL HYDROCHLORIDE 10 MG: 10 TABLET ORAL at 21:26

## 2024-11-24 RX ADMIN — MELATONIN TAB 3 MG 9 MG: 3 TAB at 21:26

## 2024-11-24 RX ADMIN — CLOZAPINE 225 MG: 25 TABLET ORAL at 21:27

## 2024-11-24 RX ADMIN — DOCUSATE SODIUM 100 MG: 100 CAPSULE, LIQUID FILLED ORAL at 08:40

## 2024-11-24 RX ADMIN — GLYCOPYRROLATE 1 MG: 1 TABLET ORAL at 15:22

## 2024-11-24 RX ADMIN — NICOTINE POLACRILEX 2 MG: 2 GUM, CHEWING ORAL at 21:26

## 2024-11-24 RX ADMIN — ATROPINE SULFATE 1 DROP: 10 SOLUTION/ DROPS OPHTHALMIC at 21:27

## 2024-11-24 RX ADMIN — ESCITALOPRAM OXALATE 20 MG: 10 TABLET, FILM COATED ORAL at 08:39

## 2024-11-24 NOTE — PLAN OF CARE
Problem: Alteration in Thoughts and Perception  Goal: Agree to be compliant with medication regime, as prescribed and report medication side effects  Description: Interventions:  - Offer appropriate PRN medication and supervise ingestion; conduct AIMS, as needed   Outcome: Progressing  Goal: Attend and participate in unit activities, including therapeutic, recreational, and educational groups  Description: Interventions:  -Encourage Visitation and family involvement in care  Outcome: Progressing  Goal: Recognize dysfunctional thoughts, communicate reality-based thoughts at the time of discharge  Description: Interventions:  - Provide medication and psycho-education to assist patient in compliance and developing insight into his/her illness   Outcome: Progressing  Goal: Complete daily ADLs, including personal hygiene independently, as able  Description: Interventions:  - Observe, teach, and assist patient with ADLS  - Monitor and promote a balance of rest/activity, with adequate nutrition and elimination   Outcome: Progressing     Problem: Ineffective Coping  Goal: Identifies ineffective coping skills  Outcome: Progressing     Problem: Alteration in Thoughts and Perception  Goal: Treatment Goal: Gain control of psychotic behaviors/thinking, reduce/eliminate presenting symptoms and demonstrate improved reality functioning upon discharge  Outcome: Not Progressing

## 2024-11-24 NOTE — ASSESSMENT & PLAN NOTE
Reviewed on 11/24/24  Follows with medical  No associated orders from this encounter found during lookback period of 72 hours.

## 2024-11-24 NOTE — ASSESSMENT & PLAN NOTE
Reviewed on 11/24/24    Alberto is cooperative with assessment. He continues to report anxiety and depression.He reports auditory hallucinations of voices telling him they are going to kill him. He denies that they are commanding in nature. Denies VH, SI,or HI. No behaviors reported.    Continue Clozapine 225 mg daily at bedtime for psychosis -- to consider further careful titration for Sxs depending on response  Continue to obtain CBC with diff, CRP, and CK (weekly on Tues), and a Clozapine level and BNP (every 2 weeks on Tues)- labs taken 11/19- WBC 10.3, CRP 11.2, Clozapine level 288    Continue Abilify 30 mg once daily for psychosis  Continue Escitalopram 20 mg once daily for depression and anxiety  Continue Senna-Docusate sodium one 50 mg tablet daily before bed for constipation  - decreased from two tablets daily on 11/3 due to reported loose stools  Continue propanolol 10 mg twice daily for anxiety   Continue amlodipine 5 mg PO daily     Pt remains on dual antipsychotic Tx due to failure on monotherapy      ACT team referral was made  No associated orders from this encounter found during lookback period of 72 hours.

## 2024-11-24 NOTE — ASSESSMENT & PLAN NOTE
Reviewed on 11/24/24  Ongoing by medical  No associated orders from this encounter found during lookback period of 72 hours.

## 2024-11-24 NOTE — ASSESSMENT & PLAN NOTE
Reviewed on 11/24/24  Continue to encourage cessation upon discharge  No associated orders from this encounter found during lookback period of 72 hours.

## 2024-11-24 NOTE — ASSESSMENT & PLAN NOTE
Reviewed on 11/24/24  Follows with medical  Continue senna-docusate sodium dose per medical team.   No associated orders from this encounter found during lookback period of 72 hours.

## 2024-11-24 NOTE — PROGRESS NOTES
Progress Note - Behavioral Health     Alberto Berumen 29 y.o. male MRN: [id number]   Unit/Bed#: [facility] 112-02 Encounter: [id number]  Assessment & Plan  Schizoaffective disorder, bipolar type (HCC)  Reviewed on 11/24/24    Alberto is cooperative with assessment. He continues to report anxiety and depression.He reports auditory hallucinations of voices telling him they are going to kill him. He denies that they are commanding in nature. Denies VH, SI,or HI. No behaviors reported.    Continue Clozapine 225 mg daily at bedtime for psychosis -- to consider further careful titration for Sxs depending on response  Continue to obtain CBC with diff, CRP, and CK (weekly on Tues), and a Clozapine level and BNP (every 2 weeks on Tues)- labs taken 11/19- WBC 10.3, CRP 11.2, Clozapine level 288    Continue Abilify 30 mg once daily for psychosis  Continue Escitalopram 20 mg once daily for depression and anxiety  Continue Senna-Docusate sodium one 50 mg tablet daily before bed for constipation  - decreased from two tablets daily on 11/3 due to reported loose stools  Continue propanolol 10 mg twice daily for anxiety   Continue amlodipine 5 mg PO daily     Pt remains on dual antipsychotic Tx due to failure on monotherapy      ACT team referral was made  No associated orders from this encounter found during lookback period of 72 hours.    Chronic idiopathic constipation  Reviewed on 11/24/24  Follows with medical  Continue senna-docusate sodium dose per medical team.   No associated orders from this encounter found during lookback period of 72 hours.  GERD (gastroesophageal reflux disease)  Reviewed on 11/24/24  Follows with medical  Continue famotidine 20 mg tablet BID per medical team   Continue glycopyrrolate 1 mg tablet BID AM and afternoon per medical team  Continue glycopyrrolate 2 mg tablet before bed per medical team  Continue pantoprazole EC 20 mg tablet every morning per medical team  No associated orders from this encounter  found during lookback period of 72 hours.  Medical clearance for psychiatric admission  Reviewed on 11/24/24  Ongoing by medical  No associated orders from this encounter found during lookback period of 72 hours.    Tobacco abuse  Reviewed on 11/24/24  Continue to encourage cessation upon discharge  No associated orders from this encounter found during lookback period of 72 hours.  Elevated hemoglobin A1c  Reviewed on 11/24/24  Follows with medical  No associated orders from this encounter found during lookback period of 72 hours.  Class 2 obesity in adult  Reviewed on 11/24/24  Follows with medical  No associated orders from this encounter found during lookback period of 72 hours.    Primary hypertension  Reviewed on 11/24/24  Follows with medical  No associated orders from this encounter found during lookback period of 72 hours.       Recommended Treatment:     Planned medication and treatment changes:    All current active medications have been reviewed  Encourage group therapy, milieu therapy and occupational therapy  Behavioral Health checks for safety monitoring  Continue current medications:    Current medications:  Current Facility-Administered Medications   Medication Dose Route Frequency Provider Last Rate    acetaminophen  650 mg Oral Q4H PRN Jordan C Holter, DO      acetaminophen  650 mg Oral Q6H PRN HOLLI Lion      aluminum-magnesium hydroxide-simethicone  30 mL Oral Q4H PRN Jordan C Holter, DO      amLODIPine  5 mg Oral Daily HOLLI Lion      ARIPiprazole  30 mg Oral Daily Bora Rosario MD      Artificial Tears  1 drop Both Eyes Q3H PRN Jordan C Holter,       atropine  1 drop Sublingual HS Harjeet Torres, DO      haloperidol lactate  2.5 mg Intramuscular Q4H PRN Max 4/day HOLLI Lion      And    LORazepam  1 mg Intramuscular Q4H PRN Max 4/day HOLLI Lion      And    benztropine  0.5 mg Intramuscular Q4H PRN Max 4/day HOLLI Lion      benztropine  1 mg  Intramuscular Q4H PRN Max 6/day [facility] Holter, DO      haloperidol lactate  5 mg Intramuscular Q4H PRN Max 4/day HOLLI Lion      And    LORazepam  2 mg Intramuscular Q4H PRN Max 4/day HOLLI Lion      And    benztropine  1 mg Intramuscular Q4H PRN Max 4/day HOLLI Lion      benztropine  1 mg Oral Q4H PRN Max 6/day HOLLI Lion      benztropine  1 mg Oral Q4H PRN Max 6/day [facility] Holter, DO      bisacodyl  10 mg Rectal Daily PRN HOLLI Lion      calcium carbonate  500 mg Oral BID PRN HOLLI Monge      cloZAPine  225 mg Oral HS Hardik So MD      hydrOXYzine HCL  50 mg Oral Q6H PRN Max 4/day [facility] Holter, DO      Or    diphenhydrAMINE  50 mg Intramuscular Q6H PRN [facility] Holter, DO      hydrOXYzine HCL  50 mg Oral Q6H PRN Max 4/day HOLLI Lion      Or    diphenhydrAMINE  50 mg Intramuscular Q6H PRN HOLLI Lion      diphenhydrAMINE-zinc acetate   Topical BID PRN HOLLI Lion      docusate sodium  100 mg Oral BID Jourdan Dickens,       escitalopram  20 mg Oral Daily HOLLI Lion      famotidine  20 mg Oral BID PRN HOLLI Monge      fluticasone  1 spray Each Nare Daily PRN HOLLI Monge      glycopyrrolate  1 mg Oral BID (AM & Afternoon) Bora Rosario MD      glycopyrrolate  2 mg Oral HS Bora Rosario MD      haloperidol  1 mg Oral Q6H PRN HOLLI Lion      haloperidol  2.5 mg Oral Q4H PRN Max 4/day HOLLI Lion      haloperidol  5 mg Oral Q4H PRN Max 4/day HOLLI Lion      hydroCHLOROthiazide  12.5 mg Oral Daily Pia Mantilla, HOLLI      hydrocortisone   Topical 4x Daily PRN HOLLI Lion      hydrOXYzine HCL  100 mg Oral Q6H PRN Max 4/day [facility] Holter, DO      Or    LORazepam  2 mg Intramuscular Q6H PRN Jordan C Holter, DO      hydrOXYzine HCL  100 mg Oral Q6H PRN Max 4/day HOLLI Lion      Or    LORazepam  2 mg Intramuscular Q6H PRN Eveline  HOLLI Hunt      hydrOXYzine HCL  25 mg Oral Q6H PRN Max 4/day [facility] Cresencioter, DO      ibuprofen  600 mg Oral Q8H PRN HOLLI Lion      influenza vaccine  0.5 mL Intramuscular Once Bora Rosario MD      loratadine  10 mg Oral Daily Brina Guillen MD      magnesium hydroxide  30 mL Oral Once Jourdan Dickens, DO      melatonin  3 mg Oral HS PRN Bora Rosario MD      melatonin  9 mg Oral HS Bora Rosario MD      methocarbamol  500 mg Oral Q6H PRN HOLLI Lion      multivitamin-minerals  1 tablet Oral Daily Eileen Jensen, HOLLI      nicotine polacrilex  2 mg Oral Q4H PRN Bora Rosario MD      OLANZapine  5 mg Oral Q4H PRN Max 3/day [facility] Holter, DO      Or    OLANZapine  2.5 mg Intramuscular Q4H PRN Max 3/day [facility] Holter, DO      OLANZapine  5 mg Oral Q3H PRN Max 3/day [facility] Holter, DO      Or    OLANZapine  5 mg Intramuscular Q3H PRN Max 3/day [facility] Holter, DO      OLANZapine  2.5 mg Oral Q4H PRN Max 6/day [facility] Holter, DO      ondansetron  4 mg Oral Q6H PRN HOLLI Lion      [facility]      polyethylene glycol  17 g Oral Daily PRN HOLLI Ghotra      polyethylene glycol  17 g Oral Daily Jourdan Dickens, DO      propranolol  10 mg Oral Q12H KAYLIE HOLLI Lion      senna-docusate sodium  1 tablet Oral Daily PRN [facility] Cresencioter, DO      senna-docusate sodium  2 tablet Oral HS Jourdan Dickens, DO      traZODone  50 mg Oral HS PRN HOLLI Lion      white petrolatum-mineral oil   Topical TID PRN HOLLI Lion         Risks / Benefits of Treatment:    Risks, benefits, and possible side effects of medications explained to patient and patient verbalizes understanding and agreement for treatment.    Subjective:    Patient was seen today for continuation of care, records reviewed and patient was discussed with the morning case review team.  Per nursing report, Alberto is due for a CBC with differential on 11/26/2024.  He is  "pending discharge to his aunt with ACT services.  No PRNs administered. No behaviors reported.    Alberto was seen today for psychiatric follow-up. On assessment today, Alberto was lying in bed.  Alberto continues to report anxiety and depression.  Alberto denies acute suicidal/self-harm ideation/intent/plan upon direct inquiry at this time.  Alberto remains behaviorally appropriate, no agitation or aggression noted on exam or in report.  Alberto also denies HI/VH, and does not appear overtly manic.  He continues to report auditory hallucinations of voices that tell him that they are going to kill him.  No overt delusions or paranoia are verbalized. Alberto remains adherent to his current psychotropic medication regimen and denies any side effects from medications, as well as none noted on exam.  Appetite and sleep remain adequate.  Alberto does not endorse any pain at this time.    Behavior over the last 24 hours: unchanged.     Sleep: normal  Appetite: normal  Medication side effects: No   ROS: no complaints    Mental Status Evaluation:    Appearance:  age appropriate, wearing hospital clothes, looks stated age   Behavior:  cooperative, calm   Speech:  scant, soft   Mood:  depressed, anxious   Affect:  flat   Thought Process:  coherent, linear, concrete   Associations: concrete associations   Thought Content:  no overt delusions   Perceptual Disturbances: no visual hallucinations, does not appear responding to internal stimuli, auditory hallucinations of \"voices telling him they are going to kill him\"   Risk Potential: Suicidal ideation - None at present  Homicidal ideation - None at present  Potential for aggression - Not at present   Sensorium:  oriented to person, place, and time/date   Memory:  recent and remote memory grossly intact   Consciousness:  alert and awake   Attention/Concentration: attention span and concentration appear shorter than expected for age   Insight:  fair   Judgment: fair "   Gait/Station: in bed   Motor Activity: no abnormal movements     Vital signs in last 24 hours:    Temp:  [97.8 °F (36.6 °C)-98.1 °F (36.7 °C)] 97.8 °F (36.6 °C)  HR:  [92-98] 92  BP: (109-226)/(7-80) 109/68  Resp:  [18] 18  SpO2:  [97 %-98 %] 98 %  O2 Device: None (Room air)    Laboratory results: I have personally reviewed all pertinent laboratory/tests results    Results from the past 24 hours: No results found for this or any previous visit (from the past 24 hours).    Suicide/Homicide Risk Assessment:    Risk of Harm to Self:   Nursing Suicide Risk Assessment Last 24 hours: C-SSRS Risk (Since Last Contact)  Calculated C-SSRS Risk Score (Since Last Contact): No Risk Indicated    Risk of Harm to Others:  Nursing Homicide Risk Assessment: Violence Risk to Others: Denies within past 6 months    The following interventions are recommended: Behavioral Health checks for safety monitoring    Progress Toward Goals: progressing    Counseling / Coordination of Care:    Total floor / unit time spent today 15 minutes. Greater than 50% of total time was spent with the patient and / or family counseling and / or coordination of care. A description of counseling / coordination of care:    HOLLI Stoddard 11/24/24    This note was not shared with the patient due to reasonable likelihood of causing patient harm

## 2024-11-24 NOTE — ASSESSMENT & PLAN NOTE
Reviewed on 11/24/24  Follows with medical  Continue famotidine 20 mg tablet BID per medical team   Continue glycopyrrolate 1 mg tablet BID AM and afternoon per medical team  Continue glycopyrrolate 2 mg tablet before bed per medical team  Continue pantoprazole EC 20 mg tablet every morning per medical team  No associated orders from this encounter found during lookback period of 72 hours.

## 2024-11-25 PROCEDURE — 99232 SBSQ HOSP IP/OBS MODERATE 35: CPT | Performed by: PSYCHIATRY & NEUROLOGY

## 2024-11-25 RX ADMIN — NICOTINE POLACRILEX 2 MG: 2 GUM, CHEWING ORAL at 17:02

## 2024-11-25 RX ADMIN — DOCUSATE SODIUM 100 MG: 100 CAPSULE, LIQUID FILLED ORAL at 17:02

## 2024-11-25 RX ADMIN — NICOTINE POLACRILEX 2 MG: 2 GUM, CHEWING ORAL at 12:36

## 2024-11-25 RX ADMIN — PANTOPRAZOLE SODIUM 20 MG: 20 TABLET, DELAYED RELEASE ORAL at 08:42

## 2024-11-25 RX ADMIN — GLYCOPYRROLATE 1 MG: 1 TABLET ORAL at 14:54

## 2024-11-25 RX ADMIN — LORATADINE 10 MG: 10 TABLET ORAL at 08:42

## 2024-11-25 RX ADMIN — DOCUSATE SODIUM 100 MG: 100 CAPSULE, LIQUID FILLED ORAL at 08:42

## 2024-11-25 RX ADMIN — ARIPIPRAZOLE 30 MG: 15 TABLET ORAL at 08:42

## 2024-11-25 RX ADMIN — CLOZAPINE 225 MG: 25 TABLET ORAL at 21:21

## 2024-11-25 RX ADMIN — GLYCOPYRROLATE 2 MG: 1 TABLET ORAL at 21:21

## 2024-11-25 RX ADMIN — ATROPINE SULFATE 1 DROP: 10 SOLUTION/ DROPS OPHTHALMIC at 21:21

## 2024-11-25 RX ADMIN — SENNOSIDES AND DOCUSATE SODIUM 2 TABLET: 50; 8.6 TABLET ORAL at 21:21

## 2024-11-25 RX ADMIN — HYDROCHLOROTHIAZIDE 12.5 MG: 12.5 TABLET ORAL at 08:42

## 2024-11-25 RX ADMIN — NICOTINE POLACRILEX 2 MG: 2 GUM, CHEWING ORAL at 21:21

## 2024-11-25 RX ADMIN — NICOTINE POLACRILEX 2 MG: 2 GUM, CHEWING ORAL at 08:55

## 2024-11-25 RX ADMIN — MULTIPLE VITAMINS W/ MINERALS TAB 1 TABLET: TAB ORAL at 08:42

## 2024-11-25 RX ADMIN — AMLODIPINE BESYLATE 5 MG: 5 TABLET ORAL at 08:42

## 2024-11-25 RX ADMIN — GLYCOPYRROLATE 1 MG: 1 TABLET ORAL at 08:42

## 2024-11-25 RX ADMIN — PROPRANOLOL HYDROCHLORIDE 10 MG: 10 TABLET ORAL at 08:42

## 2024-11-25 RX ADMIN — MELATONIN TAB 3 MG 9 MG: 3 TAB at 21:21

## 2024-11-25 RX ADMIN — PROPRANOLOL HYDROCHLORIDE 10 MG: 10 TABLET ORAL at 21:21

## 2024-11-25 RX ADMIN — ESCITALOPRAM OXALATE 20 MG: 10 TABLET, FILM COATED ORAL at 08:42

## 2024-11-25 NOTE — PLAN OF CARE
Problem: Alteration in Thoughts and Perception  Goal: Treatment Goal: Gain control of psychotic behaviors/thinking, reduce/eliminate presenting symptoms and demonstrate improved reality functioning upon discharge  Outcome: Progressing  Goal: Refrain from acting on delusional thinking/internal stimuli  Description: Interventions:  - Monitor patient closely, per order   - Utilize least restrictive measures   - Set reasonable limits, give positive feedback for acceptable   - Administer medications as ordered and monitor of potential side effects  Outcome: Progressing  Goal: Agree to be compliant with medication regime, as prescribed and report medication side effects  Description: Interventions:  - Offer appropriate PRN medication and supervise ingestion; conduct AIMS, as needed   Outcome: Progressing  Goal: Attend and participate in unit activities, including therapeutic, recreational, and educational groups  Description: Interventions:  -Encourage Visitation and family involvement in care  Outcome: Progressing  Goal: Recognize dysfunctional thoughts, communicate reality-based thoughts at the time of discharge  Description: Interventions:  - Provide medication and psycho-education to assist patient in compliance and developing insight into his/her illness   Outcome: Progressing  Goal: Complete daily ADLs, including personal hygiene independently, as able  Description: Interventions:  - Observe, teach, and assist patient with ADLS  - Monitor and promote a balance of rest/activity, with adequate nutrition and elimination   Outcome: Progressing     Problem: Alteration in Orientation  Goal: Interact with staff daily  Description: Interventions:  - Assess and re-assess patient's level of orientation  - Engage patient in 1 on 1 interactions, daily, for a minimum of 15 minutes   - Establish rapport/trust with patient   Outcome: Progressing  Goal: Express concerns related to confused thinking related to:  Description:  Interventions:  - Encourage patient to express feelings, fears, frustrations, hopes  - Assign consistent caregivers   - Supai/re-orient patient as needed  - Allow comfort items, as appropriate  - Provide visual cues, signs, etc.   Outcome: Progressing

## 2024-11-25 NOTE — PROGRESS NOTES
11/25/24 0758   Team Meeting   Meeting Type Daily Rounds   Team Members Present   Team Members Present Physician;Nurse;;Other (Discipline and Name)   Physician Team Member Holter, Sharma   Nursing Team Member Claudia   Social Work Team Member Jose J ORTEGA   Other (Discipline and Name) Wang PCM   Patient/Family Present   Patient Present No   Patient's Family Present No     Groups Participation 7/9.   He's compliant with medications. He's engaged in his treatment. Family visit on 11/27. Reports AH and depression,

## 2024-11-25 NOTE — NURSING NOTE
Patient  visible and social.  Patient did not report any voices or depression this shift. Social with select peer. Patient cooperative and pleasant upon approach. Patient medication compliant as well. No complaints noted. No prn medications requested or required.

## 2024-11-25 NOTE — PROGRESS NOTES
11/25/24 1450   Team Meeting   Meeting Type Tx Team Meeting   Initial Conference Date 11/25/24   Next Conference Date 12/09/24   Team Members Present   Team Members Present Physician;Nurse;;Other (Discipline and Name)   Physician Team Member Holter, Sharma   Nursing Team Member Claudia   Social Work Team Member Jose J ORTEGA   Other (Discipline and Name) Gaby CMP MHDS, Yadira CMP MHDS   Patient/Family Present   Patient Present Yes   Patient's Family Present No   OTHER   Team Meeting - Additional Comments Patient attened his treatment team meeting. Patient did not complete his self assessment. Patient reports continued AH, but he's using his coping skills to manage the symptoms. This writer discussed his referral was submitted to [facility] Act team. [facility] representative confirmed they received. She reports both ACT teams are looking at their [facility] census and which patient's who are receiving [facility] funding can be transferred to another program.

## 2024-11-25 NOTE — NURSING NOTE
Pt is calm and cooperative, visible on the unit after breakfast. Pt refused breakfast ate 100% of lunch. Pt continue to report AH, denies SI/HI/AVH. Pt is medication compliant, safety checks ongoing.

## 2024-11-25 NOTE — ASSESSMENT & PLAN NOTE
Reviewed on 11/25/24  Follows with medical  Continue senna-docusate sodium dose per medical team.   No associated orders from this encounter found during lookback period of 72 hours.

## 2024-11-25 NOTE — ASSESSMENT & PLAN NOTE
Reviewed on 11/25/24  Follows with medical  No associated orders from this encounter found during lookback period of 72 hours.

## 2024-11-25 NOTE — ASSESSMENT & PLAN NOTE
Reviewed on 11/25/24  Follows with medical  Continue famotidine 20 mg tablet BID per medical team   Continue glycopyrrolate 1 mg tablet BID AM and afternoon per medical team  Continue glycopyrrolate 2 mg tablet before bed per medical team  Continue pantoprazole EC 20 mg tablet every morning per medical team  No associated orders from this encounter found during lookback period of 72 hours.

## 2024-11-25 NOTE — NURSING NOTE
Pt is calm and cooperative, visible on the unit, social with peers and staff. Pt ate 100% of dinner. Pt is medication compliant, safety checks ongoing.

## 2024-11-25 NOTE — ASSESSMENT & PLAN NOTE
Reviewed on 11/25/24  Ongoing by medical  No associated orders from this encounter found during lookback period of 72 hours.

## 2024-11-25 NOTE — PROGRESS NOTES
"Progress Note - Behavioral Health   Name: Alberto Berumen 29 y.o. male I MRN: [id number]  Unit/Bed#: [facility] 112-02 I Date of Admission: 3/29/2024   Date of Service: 11/25/2024 I Hospital Day: 241     Assessment & Plan  Schizoaffective disorder, bipolar type (HCC)  Reviewed on 11/25/24    Patient reports feelings of depression and anxiety, racing thoughts, hopelessness, low-self esteem, and low energy. He states he slept \"well\" last night. He states his mood is \"depressed\". He states his depression is a 7/10 this morning. He states he is depressed and anxious due to the voices in his head constantly telling him they are going to kill him and his family and that he is going to hell. He states his anxiety is a 6/10 this morning. He states the voices are random people and not his own voice. He states he has been hearing this voices constantly for over 2 years. He states the voices have not said anything new recently. He states \"working out\" helps him cope with the voices and he used the punching bag on Saturday which helped. He denies any visual hallucinations. He reports persecutory delusions in regards to the voices he is hearing but states he does not think anyone here on the unit is going to hurt him and he feels safe here. He denies grandiose, somatic, or bizarre delusions. He reports passive death wishes \"sometimes\" but states he does not have them currently. He denies thoughts of harm to self and others, SI, or HI.     Continue Clozapine 225 mg daily at bedtime for psychosis -- to consider further careful titration for Sxs depending on response  Continue to obtain CBC with diff, CRP, and CK (weekly on Tues), and a Clozapine level and BNP (every 2 weeks on Tues)    Continue Abilify 30 mg once daily for psychosis  Continue Escitalopram 20 mg once daily for depression and anxiety  Continue Senna-Docusate sodium one 50 mg tablet daily before bed for constipation  - decreased from two tablets daily on 11/3 due to " reported loose stools  Continue propanolol 10 mg twice daily for anxiety   Continue amlodipine 5 mg PO daily     Pt remains on dual antipsychotic Tx due to failure on monotherapy      ACT team referral was made  No associated orders from this encounter found during lookback period of 72 hours.    Chronic idiopathic constipation  Reviewed on 11/25/24  Follows with medical  Continue senna-docusate sodium dose per medical team.   No associated orders from this encounter found during lookback period of 72 hours.  GERD (gastroesophageal reflux disease)  Reviewed on 11/25/24  Follows with medical  Continue famotidine 20 mg tablet BID per medical team   Continue glycopyrrolate 1 mg tablet BID AM and afternoon per medical team  Continue glycopyrrolate 2 mg tablet before bed per medical team  Continue pantoprazole EC 20 mg tablet every morning per medical team  No associated orders from this encounter found during lookback period of 72 hours.  Medical clearance for psychiatric admission  Reviewed on 11/25/24  Ongoing by medical  No associated orders from this encounter found during lookback period of 72 hours.    Tobacco abuse  Reviewed on 11/25/24  Continue to encourage cessation upon discharge  No associated orders from this encounter found during lookback period of 72 hours.  Elevated hemoglobin A1c  Reviewed on 11/25/24  Follows with medical  No associated orders from this encounter found during lookback period of 72 hours.  Class 2 obesity in adult  Reviewed on 11/25/24  Follows with medical  No associated orders from this encounter found during lookback period of 72 hours.    Primary hypertension  Reviewed on 11/25/24  Follows with medical  No associated orders from this encounter found during lookback period of 72 hours.    Psychiatry Progress Note [facility]    Progress towards goals: Progressing    Review of systems: Unremarkable    Psychiatric Diagnosis: Schizoaffective disorder, bipolar  "type    Assessment  Overall Status:  Improving   Certification Statement: The patient will continue to require additional inpatient hospital stay due to schizoaffective disorder, bipolar type and related symptoms       Medications: as above  Side effects from treatment: Denied  Medication changes: as above  Medication education: Risks side effects benefits and precautions of medications discussed with patient and they did verbalize an understanding about risks for metabolic syndrome from being on neuroleptics and tardive dyskinesia etc.  All medications reviewed and I recommend that they be continued for symptom management  Understanding of medications: Risks side effects benefits and precautions of medications discussed with patient and they did verbalize an understanding about risks for metabolic syndrome from being on neuroleptics and tardive dyskinesia etc., and patient appeared to have understanding.  Justification for dual anti-psychotics: Due to failure on monotherapy     Non-pharmacological treatments  Continue with individual, group, milieu and occupational therapy using recovery principles and psycho-education about accepting illness and the need for treatment.  Behavioral health checks every 7 minutes    Safety  Safety and communication plan established to target dynamic risk factors discussed above.    Discharge Plan   To live with aunt once [facility] approves funding on ACT team    Interval Progress: Per treatment team, patient is compliant with meals and medications. He is pleasant, cooperative and social with peers on unit. He did not require any PRN psychotropic medications over the weekend. He used the punching bag over the weekend to help cope with the voices. Team states patient needs a podiatry consult for toenails. He attended 6/9 groups on Friday.   Today, patient evaluated [facility] this writer and [facility]. Patient states his mood is \"depressed\" due to the constant threatening voices he " "hears telling him they are going to kill him and his family. He states his depression is a 7/10 this morning. He states he has been hearing the voices constantly for the past 2 years and he knows he needs to find positive ways to cope with them. He states working out helps him cope with the voices and he used the punching bag on Saturday. He denies visual hallucinations. He reports persecutory delusions regarding the threatening voices he hears but states he does not think anyone here is going to hurt him and he feels safe here. He denies grandiose, somatic, or bizarre delusions. He reports feelings of depression, anxiety, hopelessness, racing thoughts, low energy, and low self-esteem. He rates his anxiety as a 6/10 this morning. He reports passive death wishes \"sometimes\" but denies any currently. He states he cannot remember the most recent time he had passive death wishes. He denies any thoughts to harm self or others, SI, or HI. He states he slept \"well\" last night. He states he is eating well and has a good appetite. He states he is planning to have a visit with his aunt and cousin in person on the unit on Wednesday which he is looking forward to. He denies any adverse medication side effects.     Acceptance by patient: Accepting of medications and treatments   Hopefulness in recovery: Hopeful about living with his aunt upon discharge  Involved in reintegration process: Patient communication with his sister and aunt on phone- plan for visit with aunt and cousin on unit this Wednesday.  Trusting in relationship with psychiatrist: Present  Sleep: Good  Appetite: Good  Compliance with Medications: Compliant  Group attendance: 6/9 groups on Friday  Significant events: None in the past 24 hours    Mental Status Exam  Appearance: age appropriate, casually dressed, dressed appropriately  Behavior: cooperative, calm, fair  eye contact  Speech: normal rate, normal volume, normal pitch, not pressured  Mood: " "\"depressed\"  Affect: constricted, mood-congruent  Thought Process: organized, logical, coherent, goal directed, linear  Thought Content: Mild paranoia regarding threatening voices  Perceptual Disturbances: Auditory hallucinations of voices telling him they are going to kill him and his family and that he is going to hell. Denies visual hallucinations when asked. Does not appear to be responding to internal stimuli  Sensorium: alert and oriented to person, place, time and situation  Cognition: recent and remote memory grossly intact  Consciousness: alert and awake  Attention: attention span and concentration are age appropriate  Intellect: appears to be of average intelligence  Insight: fair  Judgement: fair  Motor Activity: no abnormal movements     Vitals  Temp:  [97.4 °F (36.3 °C)-97.8 °F (36.6 °C)] 97.4 °F (36.3 °C)  HR:  [] 76  Resp:  [18] 18  BP: (124-125)/(75-86) 124/86  SpO2:  [99 %] 99 %  No intake or output data in the 24 hours ending 11/25/24 1100    Lab Results: All Labs For Current Hospital Admission Reviewed  Results from last 7 days   Lab Units 11/19/24  0526   WBC Thousand/uL 10.30*   RBC Million/uL 5.42   HEMOGLOBIN g/dL 12.6   HEMATOCRIT % 42.5   MCV fL 78*   PLATELETS Thousands/uL 253   TOTAL NEUT ABS Thousands/µL 4.94   CLOZAPINE LVL ng/mL 288*       Current Facility-Administered Medications   Medication Dose Route Frequency Provider Last Rate    acetaminophen  650 mg Oral Q4H PRN Jordan C Holter, DO      acetaminophen  650 mg Oral Q6H PRN HOLLI Lion      aluminum-magnesium hydroxide-simethicone  30 mL Oral Q4H PRN Jordan C Holter, DO      amLODIPine  5 mg Oral Daily HOLLI Lion      ARIPiprazole  30 mg Oral Daily Bora Rosario MD      Artificial Tears  1 drop Both Eyes Q3H PRN Jordan C Holter, DO      atropine  1 drop Sublingual  Harjeet Torres DO      haloperidol lactate  2.5 mg Intramuscular Q4H PRN Max 4/day HOLLI Lion      And    LORazepam  1 mg Intramuscular " Q4H PRN Max 4/day HOLLI Lion      And    benztropine  0.5 mg Intramuscular Q4H PRN Max 4/day HOLLI Lion      benztropine  1 mg Intramuscular Q4H PRN Max 6/day Jordan C Holter, DO      haloperidol lactate  5 mg Intramuscular Q4H PRN Max 4/day HOLLI Lion      And    LORazepam  2 mg Intramuscular Q4H PRN Max 4/day HOLLI Lion      And    benztropine  1 mg Intramuscular Q4H PRN Max 4/day HOLLI Lion      benztropine  1 mg Oral Q4H PRN Max 6/day HOLLI Lion      benztropine  1 mg Oral Q4H PRN Max 6/day Jordan C Holter, DO      bisacodyl  10 mg Rectal Daily PRN HOLLI Lion      calcium carbonate  500 mg Oral BID PRN HOLLI Monge      cloZAPine  225 mg Oral HS Hardik So MD      hydrOXYzine HCL  50 mg Oral Q6H PRN Max 4/day Jordan C Holter, DO      Or    diphenhydrAMINE  50 mg Intramuscular Q6H PRN Jordan C Holter, DO      hydrOXYzine HCL  50 mg Oral Q6H PRN Max 4/day HOLLI Lion      Or    diphenhydrAMINE  50 mg Intramuscular Q6H PRN HLOLI Lion      diphenhydrAMINE-zinc acetate   Topical BID PRN HOLLI Lion      docusate sodium  100 mg Oral BID Jourdan Dickens, DO      escitalopram  20 mg Oral Daily HOLLI Lion      famotidine  20 mg Oral BID PRN HOLLI Monge      fluticasone  1 spray Each Nare Daily PRN HOLLI Monge      glycopyrrolate  1 mg Oral BID (AM & Afternoon) Bora Rosario MD      glycopyrrolate  2 mg Oral HS Bora Rosario MD      haloperidol  1 mg Oral Q6H PRN HOLLI Lion      haloperidol  2.5 mg Oral Q4H PRN Max 4/day HOLLI Lion      haloperidol  5 mg Oral Q4H PRN Max 4/day HOLLI Lion      hydroCHLOROthiazide  12.5 mg Oral Daily HOLLI Galvan      hydrocortisone   Topical 4x Daily PRN HOLLI Lion      hydrOXYzine HCL  100 mg Oral Q6H PRN Max 4/day Jordan C Holter, DO      Or    LORazepam  2 mg Intramuscular Q6H PRN Ion FISCHER  Holter, DO      hydrOXYzine HCL  100 mg Oral Q6H PRN Max 4/day HOLLI Lion      Or    LORazepam  2 mg Intramuscular Q6H PRN HOLLI Lion      hydrOXYzine HCL  25 mg Oral Q6H PRN Max 4/day [facility] Holter, DO      ibuprofen  600 mg Oral Q8H PRN HOLLI Lion      influenza vaccine  0.5 mL Intramuscular Once Bora Rosario MD      loratadine  10 mg Oral Daily Brina Guillen MD      magnesium hydroxide  30 mL Oral Once Jourdan Dickens, DO      melatonin  3 mg Oral HS PRN Bora Rosario MD      melatonin  9 mg Oral HS Bora Rosario MD      methocarbamol  500 mg Oral Q6H PRN HOLLI Lion      multivitamin-minerals  1 tablet Oral Daily Eileen MiyaHOLLI Jimenez      nicotine polacrilex  2 mg Oral Q4H PRN Bora Rosario MD      OLANZapine  5 mg Oral Q4H PRN Max 3/day [facility] Holter, DO      Or    OLANZapine  2.5 mg Intramuscular Q4H PRN Max 3/day [facility] Holter, DO      OLANZapine  5 mg Oral Q3H PRN Max 3/day [facility] Holter, DO      Or    OLANZapine  5 mg Intramuscular Q3H PRN Max 3/day [facility] Holter, DO      OLANZapine  2.5 mg Oral Q4H PRN Max 6/day [facility] Holter, DO      ondansetron  4 mg Oral Q6H PRN HOLLI Lion      [facility]antopraz[facility]      polyethylene glycol  17 g Oral Daily PRN HOLLI Ghotra      polyethylene glycol  17 g Oral Daily Jourdan Dickens, DO      propranolol  10 mg Oral Q12H KAYLIE HOLLI Lion      senna-docusate sodium  1 tablet Oral Daily PRN [facility] Holter, DO      senna-docusate sodium  2 tablet Oral HS Jourdan Dickens, DO      traZODone  50 mg Oral HS PRN HOLLI Lion      white petrolatum-mineral oil   Topical TID PRN HOLLI Lion         Counseling / Coordination of Care: Total floor / unit time spent today 15 minutes. Greater than 50% of total time was spent with the patient and / or family counseling and / or somewhat receptive to supportive listening and teaching positive coping skills to deal with  symptom mangement.     Patient's Rights, confidentiality and exceptions to confidentiality, use of automated medical record, Behavioral Health Services staff access to medical record, and consent to treatment reviewed.    This note has been dictated and hence there may be problems with punctuation, spelling and formatting and if anyone has any concerns please address them to Dr. Mora.  This note is not shared with patient due to potential for making patient's condition worse by knowing the content of the note.    VERONICA FloresS

## 2024-11-25 NOTE — SOCIAL WORK
This writer spoke with patient's Aunt and discussed his progress. This writer discussed patient continues to report AH and depression, however he's used his coping skills to manage his depression. This writer discussed we have submitted his ACT referral to [facility]. This writer discussed he will have a county meeting on Monday/. Aunt confirms he will return to live with her. She requested a family visit on 11/27.   This writer left voice message for patient's sister Tere, requesting a f/u call.

## 2024-11-25 NOTE — ASSESSMENT & PLAN NOTE
Reviewed on 11/25/24  Continue to encourage cessation upon discharge  No associated orders from this encounter found during lookback period of 72 hours.

## 2024-11-25 NOTE — ASSESSMENT & PLAN NOTE
"Reviewed on 11/25/24    Patient reports feelings of depression and anxiety, racing thoughts, hopelessness, low-self esteem, and low energy. He states he slept \"well\" last night. He states his mood is \"depressed\". He states his depression is a 7/10 this morning. He states he is depressed and anxious due to the voices in his head constantly telling him they are going to kill him and his family and that he is going to hell. He states his anxiety is a 6/10 this morning. He states the voices are random people and not his own voice. He states he has been hearing this voices constantly for over 2 years. He states the voices have not said anything new recently. He states \"working out\" helps him cope with the voices and he used the punching bag on Saturday which helped. He denies any visual hallucinations. He reports persecutory delusions in regards to the voices he is hearing but states he does not think anyone here on the unit is going to hurt him and he feels safe here. He denies grandiose, somatic, or bizarre delusions. He reports passive death wishes \"sometimes\" but states he does not have them currently. He denies thoughts of harm to self and others, SI, or HI.     Continue Clozapine 225 mg daily at bedtime for psychosis -- to consider further careful titration for Sxs depending on response  Continue to obtain CBC with diff, CRP, and CK (weekly on Tues), and a Clozapine level and BNP (every 2 weeks on Tues)    Continue Abilify 30 mg once daily for psychosis  Continue Escitalopram 20 mg once daily for depression and anxiety  Continue Senna-Docusate sodium one 50 mg tablet daily before bed for constipation  - decreased from two tablets daily on 11/3 due to reported loose stools  Continue propanolol 10 mg twice daily for anxiety   Continue amlodipine 5 mg PO daily     Pt remains on dual antipsychotic Tx due to failure on monotherapy      ACT team referral was made  No associated orders from this encounter found during " lookback period of 72 hours.

## 2024-11-26 LAB
BASOPHILS # BLD AUTO: 0.05 THOUSANDS/ÂΜL (ref 0–0.1)
BASOPHILS NFR BLD AUTO: 1 % (ref 0–1)
EOSINOPHIL # BLD AUTO: 0.38 THOUSAND/ÂΜL (ref 0–0.61)
EOSINOPHIL NFR BLD AUTO: 4 % (ref 0–6)
ERYTHROCYTE [DISTWIDTH] IN BLOOD BY AUTOMATED COUNT: 14.4 % (ref 11.6–15.1)
HCT VFR BLD AUTO: 43.4 % (ref 36.5–49.3)
HGB BLD-MCNC: 13 G/DL (ref 12–17)
IMM GRANULOCYTES # BLD AUTO: 0.03 THOUSAND/UL (ref 0–0.2)
IMM GRANULOCYTES NFR BLD AUTO: 0 % (ref 0–2)
LYMPHOCYTES # BLD AUTO: 2.98 THOUSANDS/ÂΜL (ref 0.6–4.47)
LYMPHOCYTES NFR BLD AUTO: 34 % (ref 14–44)
MCH RBC QN AUTO: 23.5 PG (ref 26.8–34.3)
MCHC RBC AUTO-ENTMCNC: 30 G/DL (ref 31.4–37.4)
MCV RBC AUTO: 78 FL (ref 82–98)
MONOCYTES # BLD AUTO: 0.73 THOUSAND/ÂΜL (ref 0.17–1.22)
MONOCYTES NFR BLD AUTO: 8 % (ref 4–12)
NEUTROPHILS # BLD AUTO: 4.71 THOUSANDS/ÂΜL (ref 1.85–7.62)
NEUTS SEG NFR BLD AUTO: 53 % (ref 43–75)
NRBC BLD AUTO-RTO: 0 /100 WBCS
PLATELET # BLD AUTO: 272 THOUSANDS/UL (ref 149–390)
PMV BLD AUTO: 10.1 FL (ref 8.9–12.7)
RBC # BLD AUTO: 5.54 MILLION/UL (ref 3.88–5.62)
WBC # BLD AUTO: 8.88 THOUSAND/UL (ref 4.31–10.16)

## 2024-11-26 PROCEDURE — 99232 SBSQ HOSP IP/OBS MODERATE 35: CPT | Performed by: PSYCHIATRY & NEUROLOGY

## 2024-11-26 PROCEDURE — 0HBRXZZ EXCISION OF TOE NAIL, EXTERNAL APPROACH: ICD-10-PCS | Performed by: PODIATRIST

## 2024-11-26 PROCEDURE — 85025 COMPLETE CBC W/AUTO DIFF WBC: CPT | Performed by: PSYCHIATRY & NEUROLOGY

## 2024-11-26 RX ADMIN — PROPRANOLOL HYDROCHLORIDE 10 MG: 10 TABLET ORAL at 21:15

## 2024-11-26 RX ADMIN — PANTOPRAZOLE SODIUM 20 MG: 20 TABLET, DELAYED RELEASE ORAL at 08:59

## 2024-11-26 RX ADMIN — GLYCOPYRROLATE 2 MG: 1 TABLET ORAL at 21:16

## 2024-11-26 RX ADMIN — ATROPINE SULFATE 1 DROP: 10 SOLUTION/ DROPS OPHTHALMIC at 21:15

## 2024-11-26 RX ADMIN — NICOTINE POLACRILEX 2 MG: 2 GUM, CHEWING ORAL at 21:15

## 2024-11-26 RX ADMIN — DOCUSATE SODIUM 100 MG: 100 CAPSULE, LIQUID FILLED ORAL at 17:00

## 2024-11-26 RX ADMIN — ARIPIPRAZOLE 30 MG: 15 TABLET ORAL at 09:00

## 2024-11-26 RX ADMIN — GLYCOPYRROLATE 1 MG: 1 TABLET ORAL at 08:59

## 2024-11-26 RX ADMIN — DOCUSATE SODIUM 100 MG: 100 CAPSULE, LIQUID FILLED ORAL at 08:58

## 2024-11-26 RX ADMIN — GLYCOPYRROLATE 1 MG: 1 TABLET ORAL at 13:03

## 2024-11-26 RX ADMIN — HYDROCHLOROTHIAZIDE 12.5 MG: 12.5 TABLET ORAL at 08:58

## 2024-11-26 RX ADMIN — ESCITALOPRAM OXALATE 20 MG: 10 TABLET, FILM COATED ORAL at 09:00

## 2024-11-26 RX ADMIN — SENNOSIDES AND DOCUSATE SODIUM 2 TABLET: 50; 8.6 TABLET ORAL at 21:15

## 2024-11-26 RX ADMIN — NICOTINE POLACRILEX 2 MG: 2 GUM, CHEWING ORAL at 09:00

## 2024-11-26 RX ADMIN — MELATONIN TAB 3 MG 9 MG: 3 TAB at 21:16

## 2024-11-26 RX ADMIN — LORATADINE 10 MG: 10 TABLET ORAL at 08:59

## 2024-11-26 RX ADMIN — CLOZAPINE 225 MG: 25 TABLET ORAL at 21:15

## 2024-11-26 RX ADMIN — PROPRANOLOL HYDROCHLORIDE 10 MG: 10 TABLET ORAL at 08:57

## 2024-11-26 RX ADMIN — NICOTINE POLACRILEX 2 MG: 2 GUM, CHEWING ORAL at 16:59

## 2024-11-26 RX ADMIN — MULTIPLE VITAMINS W/ MINERALS TAB 1 TABLET: TAB ORAL at 08:59

## 2024-11-26 RX ADMIN — NICOTINE POLACRILEX 2 MG: 2 GUM, CHEWING ORAL at 13:03

## 2024-11-26 NOTE — CONSULTS
Consult Note- Podiatry   Alberto Berumen 29 y.o. male MRN: [id number]  Unit/Bed#: [facility] 112-02 Encounter: [id number]    Assessment & Plan     Assessment:  In-grown toenail  2. Pain in toe     Plan:  - -pt eval and managed    - Number and complexity of problems addressed:  1 undiagnosed new problem with uncertain prognosis   as shown    - Amount/complexity of data reviewed and analyzed:     Category 1: prior patient notes were analyzed today before evaluating and managing patient. All PMH were discussed with pt today.       - Risk of complications: moderate risk of morbidity from additional testing or treatment involved with this patient, which includes but not limited to:    - discussed anatomy, condition, treatment plan and options. They were instructed on proper foot care. The patient was seen today for greater than total of  45-59 minutes   . This is total time spent today involving both face-to-face time and non face-to-face time. This time spent includes  reviewing their past medical history  , performing a medically appropriate examination and evaluation of the patient, counseling and educating the patient,  documenting all findings in EMR, and independently interpreting results and communicating results with  patient     and discussing their condition and treatment options, risks, and potential complications. I have discussed the findings of this examination with the patient. The discussion included a complete verbal explanation of the examination results, diagnosis and planned treatment(s). A schedule for future care needs was explained. The patient has verbalized the understanding of these instructions at this time. If any questions should arise after returning home I have encouraged the patient to feel free to call the office.    - d/w pt that discomfort is secondary to toe nail thickening  - Right great toe slant back performed to relieve pain from in-growing nail.    - All questions and concerns  addressed.    - Podiatry signing off, thank you for the consult.     History of Present Illness     HPI:  Alberto Berumen is a 29 y.o. male who presents with painful, elongated toenails and in-grown. They have difficulty applying their socks and shoes due to the elongation of the nails. The pressure within their shoe gear is painful and they have been unable to cut their nails adequately. Patient states pain is 1/10 in shoe gear. Pain with pressure.     Inpatient consult to Podiatry  Consult performed by: Kayleigh Parisi DPM  Consult ordered by: Jordan C Holter, DO        Review of Systems   Constitutional: Negative.    HENT: Negative.    Eyes: Negative.    Respiratory: Negative.    Cardiovascular: Negative.    Gastrointestinal: Negative.    Musculoskeletal: Negative   Skin: elongated thickened toenails, 1   Neurological: Negative.        Historical Information   Past Medical History:   Diagnosis Date    Anemia     Chronic idiopathic constipation     GERD (gastroesophageal reflux disease)     Schizoaffective disorder (HCC)     Syringoma     T wave inversion in EKG      Past Surgical History:   Procedure Laterality Date    COLONOSCOPY       Social History   Social History     Substance and Sexual Activity   Alcohol Use Not Currently    Comment: pt reports he does not drink     Social History     Substance and Sexual Activity   Drug Use Not Currently     Social History     Tobacco Use   Smoking Status Every Day    Current packs/day: 0.25    Types: Cigarettes   Smokeless Tobacco Never   Tobacco Comments    Pt reports he does not want to quit and does not want information. He declines Quitline information     Family History: History reviewed. No pertinent family history.    Meds/Allergies     Medications Prior to Admission:     benztropine (COGENTIN) 0.5 mg tablet    escitalopram (LEXAPRO) 10 mg tablet    haloperidol (HALDOL) 10 mg tablet    lithium carbonate (LITHOBID) 300 mg CR tablet    mirtazapine (REMERON) 30 mg  "tablet    OLANZapine (ZyPREXA) 20 MG tablet    traZODone (DESYREL) 50 mg tablet  Allergies   Allergen Reactions    Pollen Extract Sneezing       Objective   First Vitals:   Blood Pressure: 122/76 (03/29/24 2000)  Pulse: 100 (03/29/24 2000)  Temperature: 97.6 °F (36.4 °C) (03/29/24 2000)  Temp Source: Tympanic (03/29/24 2000)  Respirations: 18 (03/29/24 2000)  Height: 5' 6\" (167.6 cm) (03/29/24 2000)  Weight - Scale: 116 kg (256 lb 6.4 oz) (03/29/24 2000)  SpO2: 99 % (03/29/24 2000)    Current Vitals:   Blood Pressure: 116/76 (11/26/24 0753)  Pulse: 87 (11/26/24 0753)  Temperature: (!) 96.2 °F (35.7 °C) (11/26/24 0753)  Temp Source: Temporal (11/26/24 0753)  Respirations: 18 (11/26/24 0753)  Height: 5' 6\" (167.6 cm) (03/29/24 2225)  Weight - Scale: 110 kg (242 lb) (11/24/24 0741)  SpO2: 96 % (11/26/24 0753)    /76 (BP Location: Left arm)   Pulse 87   Temp (!) 96.2 °F (35.7 °C) (Temporal)   Resp 18   Ht 5' 6\" (1.676 m)   Wt 110 kg (242 lb)   SpO2 96%   BMI 39.06 kg/m²     General Appearance:    Alert, cooperative, no distress   Head:    Normocephalic, without obvious abnormality, atraumatic   Eyes:    PERRL, conjunctiva/corneas clear, EOM's intact            Nose:   Moist mucous membranes, no drainage or sinus tenderness   Throat:   No tenderness, no exudates   Neck:   Supple, symmetrical, trachea midline, no JVD   Back:     Symmetric, no CVA tenderness   Lungs:     Clear to auscultation bilaterally, respirations unlabored   Chest wall:    No tenderness or deformity   Heart:    Regular rate and rhythm, S1 and S2 normal, no murmur, rub   or gallop   Abdomen:     Soft, non-tender, bowel sounds active all four quadrants,     no masses, no organomegaly     Extremities:   MMT is 5/5 to all compartments of the LE, +0/4 edema B/L, Digital ROM is intact,    Pulses:   R DP is +4/4, R PT is +4/4, L DP is +4/4, L PT is +4/4, CFT< 3sec to all digits. Thin/shiny skin noted to the B/L LE, pigmentary changes to B/L LE. " Absent digital hair growth b/l. Nail thickening b/l.    Skin:   Nails are yellow discolored, thickened, elongated, with notable subungual debris and > 2 mm thickness noted to toenails 1-5 B/L. Right great toe appears to be in-grown on the medial side. Pain on palpation. No signs of infection. No open Lesions.          Neurologic:   CNII-XII intact. Normal strength, sensation and reflexes       Throughout. Gross sensation is intact. Protective sensation is intact.       Lab Results:   No results displayed because visit has over 200 results.        Imaging: I have personally reviewed pertinent films in PACS  EKG, Pathology, and Other Studies: I have personally reviewed pertinent reports.      Code Status: Level 1 - Full Code  Advance Directive and Living Will:      Power of :    POLST:

## 2024-11-26 NOTE — PLAN OF CARE
Problem: Alteration in Thoughts and Perception  Goal: Refrain from acting on delusional thinking/internal stimuli  Description: Interventions:  - Monitor patient closely, per order   - Utilize least restrictive measures   - Set reasonable limits, give positive feedback for acceptable   - Administer medications as ordered and monitor of potential side effects  Outcome: Progressing  Goal: Agree to be compliant with medication regime, as prescribed and report medication side effects  Description: Interventions:  - Offer appropriate PRN medication and supervise ingestion; conduct AIMS, as needed   Outcome: Progressing  Goal: Attend and participate in unit activities, including therapeutic, recreational, and educational groups  Description: Interventions:  -Encourage Visitation and family involvement in care  Outcome: Progressing  Goal: Complete daily ADLs, including personal hygiene independently, as able  Description: Interventions:  - Observe, teach, and assist patient with ADLS  - Monitor and promote a balance of rest/activity, with adequate nutrition and elimination   Outcome: Progressing     Problem: Ineffective Coping  Goal: Identifies healthy coping skills  Outcome: Progressing  Goal: Demonstrates healthy coping skills  Outcome: Progressing  Goal: Participates in unit activities  Description: Interventions:  - Provide therapeutic environment   - Provide required programming   - Redirect inappropriate behaviors   Outcome: Progressing     Problem: Depression  Goal: Verbalize thoughts and feelings  Description: Interventions:  - Assess and re-assess patient's level of risk   - Engage patient in 1:1 interactions, daily, for a minimum of 15 minutes   - Encourage patient to express feelings, fears, frustrations, hopes   Outcome: Progressing  Goal: Refrain from isolation  Description: Interventions:  - Develop a trusting relationship   - Encourage socialization   Outcome: Progressing  Goal: Refrain from  self-neglect  Outcome: Progressing     Problem: Anxiety  Goal: Anxiety is at manageable level  Description: Interventions:  - Assess and monitor patient's anxiety level.   - Monitor for signs and symptoms (heart palpitations, chest pain, shortness of breath, headaches, nausea, feeling jumpy, restlessness, irritable, apprehensive).   - Collaborate with interdisciplinary team and initiate plan and interventions as ordered.  - [place] patient to unit/surroundings  - Explain treatment plan  - Encourage participation in care  - Encourage verbalization of concerns/fears  - Identify coping mechanisms  - Assist in developing anxiety-reducing skills  - Administer/offer alternative therapies  - Limit or eliminate stimulants  Outcome: Progressing

## 2024-11-26 NOTE — NURSING NOTE
Pt is in bed asleep, observed eyes closed, and chest movement noted. 15 minute safety checks maintained. No unmet needs at this time. Will continue to monitor patient needs, sleep pattern, and behaviors.     0558: Patient has slept all night, 7+ hours of uninterrupted sleep

## 2024-11-26 NOTE — ASSESSMENT & PLAN NOTE
"Reviewed on 11/26/24    Patient reports feelings of depression and anxiety, racing thoughts, hopelessness, low-self esteem, and low energy. He states he slept \"well\" last night. He states his mood is \"depressed\". He states his depression is a 6/10 this morning and it is slightly improved. He states he is depressed and anxious due to the voices in his head constantly telling him they are going to kill him and his family and that he is going to hell. He states his anxiety is a 4/10 this morning which is slightly improved. He states the voices are random people and not his own voice. He states he has been hearing this voices constantly for over 2 years. He states the voices have not said anything new recently. He states \"working out\", walking and talking with peers and listening to music helps him cope with the voices and he used the punching bag yesterday which helped. He denies any visual hallucinations. He reports persecutory delusions in regards to the voices he is hearing but states he does not think anyone here on the unit is going to hurt him and he feels safe here. He denies grandiose, somatic, or bizarre delusions. He reports passive death wishes. He denies thoughts of harm to self and others, SI, or HI.     Continue Clozapine 225 mg daily at bedtime for psychosis -- to consider further careful titration for Sxs depending on response  Continue to obtain CBC with diff, CRP, and CK (weekly on Tues), and a Clozapine level and BNP (every 2 weeks on Tues)    Continue Abilify 30 mg once daily for psychosis  Continue Escitalopram 20 mg once daily for depression and anxiety  Continue Senna-Docusate sodium one 50 mg tablet daily before bed for constipation  - decreased from two tablets daily on 11/3 due to reported loose stools  Continue propanolol 10 mg twice daily for anxiety   Continue amlodipine 5 mg PO daily     Pt remains on dual antipsychotic Tx due to failure on monotherapy      ACT team referral was made  No " associated orders from this encounter found during lookback period of 72 hours.

## 2024-11-26 NOTE — PLAN OF CARE
Problem: Ineffective Coping  Goal: Identifies ineffective coping skills  Outcome: Progressing  Goal: Identifies healthy coping skills  Outcome: Progressing  Goal: Demonstrates healthy coping skills  Outcome: Progressing  Goal: Participates in unit activities  Description: Interventions:  - Provide therapeutic environment   - Provide required programming   - Redirect inappropriate behaviors   Outcome: Progressing   He continues to make progress towards the goals by actively participate and attending groups.

## 2024-11-26 NOTE — NURSING NOTE
Pt is visible on the unit and social with select peers. Consumed 100% of dinner. Took medications without incidence. Pt is pleasant and cooperative. Attended 6/7 groups. Reports the same AH that threaten to kill him and his family. Pt uses appropriate coping skills. Denies SI/HI/VH. No behavioral issues. Pt offers no complaints.

## 2024-11-26 NOTE — PROGRESS NOTES
"Progress Note - Behavioral Health   Name: Alberto Berumen 29 y.o. male I MRN: [id number]  Unit/Bed#: [facility] 112-02 I Date of Admission: 3/29/2024   Date of Service: 11/26/2024 I Hospital Day: 242     Assessment & Plan  Schizoaffective disorder, bipolar type (HCC)  Reviewed on 11/26/24    Patient reports feelings of depression and anxiety, racing thoughts, hopelessness, low-self esteem, and low energy. He states he slept \"well\" last night. He states his mood is \"depressed\". He states his depression is a 6/10 this morning and it is slightly improved. He states he is depressed and anxious due to the voices in his head constantly telling him they are going to kill him and his family and that he is going to hell. He states his anxiety is a 4/10 this morning which is slightly improved. He states the voices are random people and not his own voice. He states he has been hearing this voices constantly for over 2 years. He states the voices have not said anything new recently. He states \"working out\", walking and talking with peers and listening to music helps him cope with the voices and he used the punching bag yesterday which helped. He denies any visual hallucinations. He reports persecutory delusions in regards to the voices he is hearing but states he does not think anyone here on the unit is going to hurt him and he feels safe here. He denies grandiose, somatic, or bizarre delusions. He reports passive death wishes. He denies thoughts of harm to self and others, SI, or HI.     Continue Clozapine 225 mg daily at bedtime for psychosis -- to consider further careful titration for Sxs depending on response  Continue to obtain CBC with diff, CRP, and CK (weekly on Tues), and a Clozapine level and BNP (every 2 weeks on Tues)    Continue Abilify 30 mg once daily for psychosis  Continue Escitalopram 20 mg once daily for depression and anxiety  Continue Senna-Docusate sodium one 50 mg tablet daily before bed for constipation  - " decreased from two tablets daily on 11/3 due to reported loose stools  Continue propanolol 10 mg twice daily for anxiety   Continue amlodipine 5 mg PO daily     Pt remains on dual antipsychotic Tx due to failure on monotherapy      ACT team referral was made  No associated orders from this encounter found during lookback period of 72 hours.    Chronic idiopathic constipation  Reviewed on 11/26/24  Follows with medical  Continue senna-docusate sodium dose per medical team.   No associated orders from this encounter found during lookback period of 72 hours.  GERD (gastroesophageal reflux disease)  Reviewed on 11/26/24  Follows with medical  Continue famotidine 20 mg tablet BID per medical team   Continue glycopyrrolate 1 mg tablet BID AM and afternoon per medical team  Continue glycopyrrolate 2 mg tablet before bed per medical team  Continue pantoprazole EC 20 mg tablet every morning per medical team  No associated orders from this encounter found during lookback period of 72 hours.  Medical clearance for psychiatric admission  Reviewed on 11/26/24  Ongoing by medical  No associated orders from this encounter found during lookback period of 72 hours.    Tobacco abuse  Reviewed on 11/26/24  Continue to encourage cessation upon discharge  No associated orders from this encounter found during lookback period of 72 hours.  Elevated hemoglobin A1c  Reviewed on 11/26/24  Follows with medical  No associated orders from this encounter found during lookback period of 72 hours.  Class 2 obesity in adult  Reviewed on 11/26/24  Follows with medical  No associated orders from this encounter found during lookback period of 72 hours.    Primary hypertension  Reviewed on 11/26/24  Follows with medical  No associated orders from this encounter found during lookback period of 72 hours.    Psychiatry Progress Note [facility]    Progress towards goals: Progressing    Review of systems: Unremarkable    Psychiatric  Diagnosis: Schizoaffective disorder, bipolar type    Assessment  Overall Status:  Improving  Certification Statement: The patient will continue to require additional inpatient hospital stay due to schizoaffective disorder, bipolar type and related symptoms       Medications: as above  Side effects from treatment: Denied  Medication changes: as above  Medication education: Risks side effects benefits and precautions of medications discussed with patient and they did verbalize an understanding about risks for metabolic syndrome from being on neuroleptics and tardive dyskinesia etc.  All medications reviewed and I recommend that they be continued for symptom management  Understanding of medications: Risks side effects benefits and precautions of medications discussed with patient and they did verbalize an understanding about risks for metabolic syndrome from being on neuroleptics and tardive dyskinesia etc., and patient appeared to have understanding.  Justification for dual anti-psychotics: Due to failure on monotherapy    Non-pharmacological treatments  Continue with individual, group, milieu and occupational therapy using recovery principles and psycho-education about accepting illness and the need for treatment.  Behavioral health checks every 7 minutes    Safety  Safety and communication plan established to target dynamic risk factors discussed above.    Discharge Plan   To live with aunt once [facility] approves funding on ACT team    Interval Progress: Per treatment team, patient is compliant with meals and medications. He is pleasant, cooperative, and social with peers on unit. He did not require any PRN psychotropic medications yesterday or this morning. He used the punching bag yesterday to help cope with the voices he hears. He was seen by podiatrist yesterday for toenails- tolerated well. He attended 6/7 groups yesterday.     Today, patient evaluated by this writer and Dr. Holter in [facility]. He states his mood  "is \"depressed\" due to the constant voices telling him they are going to kill him and his family and that he is going to hell. He rates his depression as 6/10 this morning and it is slightly improved. He states the voices have been constant for the past 2 years but he is trying to find positive ways to cope with them. He states working out, walking and talking with friends, and listening to music helps him cope with the voices. He states he used the punching bag yesterday which helped. He denies visual hallucinations. He reports persecutory delusions regarding the threatening voices but states he does not think anyone here on the unit is going to hurt him and he feels safe here. He denies any grandiose, somatic, or bizarre delusions. He reports feelings of depression, anxiety, hopelessness, racing thoughts, low energy, and low self-esteem. He rates his anxiety as 4/10 this morning and it is slightly improved. He reports passive death wishes. He denies thoughts of harm to self or others, SI, or HI. He states he slept \"well\" last night but is still tired. He states he is eating well and has a good appetite. He is looking forward to in-person visit with his aunt and cousin tomorrow. He denies any medication side effects.    Acceptance by patient: Accepting of medications and treatments  Hopefulness in recovery: Hopeful about living with his aunt upon discharge  Involved in reintegration process: Patient communication with aunt and sister on phone- plan for visit with aunt and cousin tomorrow.  Trusting in relationship with psychiatrist: Present  Sleep: Good  Appetite: Good  Compliance with Medications: Compliant  Group attendance: 6/7 groups yesterday  Significant events: None in the past 24 hours    Mental Status Exam  Appearance: age appropriate, casually dressed, dressed appropriately  Behavior: cooperative, calm, fair  eye contact  Speech: normal rate, normal pitch, not pressured, low volume  Mood: " "\"depressed\"  Affect: constricted, mood-congruent  Thought Process: organized, logical, coherent, goal directed  Thought Content: Mild paranoia regarding threatening voices  Perceptual Disturbances: Auditory hallucinations of voices saying they are going to kill him and his family and that he is going to hell. Denies visual hallucinations when asked. Does not appear to be responding to internal stimuli  Sensorium: alert and oriented to person, place, time and situation  Cognition: recent and remote memory grossly intact  Consciousness: alert and awake  Attention: attention span and concentration are age appropriate  Intellect: appears to be of average intelligence  Insight: fair  Judgement: fair  Motor Activity: no abnormal movements     Vitals  Temp:  [96.2 °F (35.7 °C)-97.6 °F (36.4 °C)] 96.2 °F (35.7 °C)  HR:  [86-91] 87  Resp:  [18] 18  BP: (116-136)/(66-79) 116/76  SpO2:  [96 %-100 %] 96 %  No intake or output data in the 24 hours ending 11/26/24 1049    Lab Results: All Labs For Current Hospital Admission Reviewed  Results from last 7 days   Lab Units 11/26/24  0948   WBC Thousand/uL 8.88   RBC Million/uL 5.54   HEMOGLOBIN g/dL 13.0   HEMATOCRIT % 43.4   MCV fL 78*   PLATELETS Thousands/uL 272   TOTAL NEUT ABS Thousands/µL 4.71       Current Facility-Administered Medications   Medication Dose Route Frequency Provider Last Rate    acetaminophen  650 mg Oral Q4H PRN Jordan C Holter, DO      acetaminophen  650 mg Oral Q6H PRN HOLLI Lion      aluminum-magnesium hydroxide-simethicone  30 mL Oral Q4H PRN Jordan C Holter, DO      amLODIPine  5 mg Oral Daily HOLLI Lion      ARIPiprazole  30 mg Oral Daily Bora Rosario MD      Artificial Tears  1 drop Both Eyes Q3H PRN Jordan C Holter, DO      atropine  1 drop Sublingual GAEL Torres DO      haloperidol lactate  2.5 mg Intramuscular Q4H PRN Max 4/day HOLLI Lion      And    LORazepam  1 mg Intramuscular Q4H PRN Max 4/day HOLLI Lion  "     And    benztropine  0.5 mg Intramuscular Q4H PRN Max 4/day HOLLI Lion      benztropine  1 mg Intramuscular Q4H PRN Max 6/day Jordan C Holter, DO      haloperidol lactate  5 mg Intramuscular Q4H PRN Max 4/day HOLLI Lion      And    LORazepam  2 mg Intramuscular Q4H PRN Max 4/day HOLLI Lion      And    benztropine  1 mg Intramuscular Q4H PRN Max 4/day HOLLI Lion      benztropine  1 mg Oral Q4H PRN Max 6/day HOLLI Lion      benztropine  1 mg Oral Q4H PRN Max 6/day Jordan C Holter, DO      bisacodyl  10 mg Rectal Daily PRN HOLLI Lion      calcium carbonate  500 mg Oral BID PRN HOLLI Monge      cloZAPine  225 mg Oral HS Hardik So MD      hydrOXYzine HCL  50 mg Oral Q6H PRN Max 4/day Jordan C Holter, DO      Or    diphenhydrAMINE  50 mg Intramuscular Q6H PRN Jordan C Holter, DO      hydrOXYzine HCL  50 mg Oral Q6H PRN Max 4/day HOLLI Lion      Or    diphenhydrAMINE  50 mg Intramuscular Q6H PRN HOLLI Lion      diphenhydrAMINE-zinc acetate   Topical BID PRN HOLLI Lion      docusate sodium  100 mg Oral BID Jourdan Dickens, DO      escitalopram  20 mg Oral Daily HOLLI Lion      famotidine  20 mg Oral BID PRN HOLLI Monge      fluticasone  1 spray Each Nare Daily PRN HOLLI Monge      glycopyrrolate  1 mg Oral BID (AM & Afternoon) Bora Rosario MD      glycopyrrolate  2 mg Oral HS Bora Rosario MD      haloperidol  1 mg Oral Q6H PRN HOLLI Lion      haloperidol  2.5 mg Oral Q4H PRN Max 4/day HOLLI Lion      haloperidol  5 mg Oral Q4H PRN Max 4/day HOLLI Lion      hydroCHLOROthiazide  12.5 mg Oral Daily HOLLI Galvan      hydrocortisone   Topical 4x Daily PRN HOLLI Lion      hydrOXYzine HCL  100 mg Oral Q6H PRN Max 4/day Jordan C Holter, DO      Or    LORazepam  2 mg Intramuscular Q6H PRN Jordan C Holter, DO      hydrOXYzine HCL  100  mg Oral Q6H PRN Max 4/day HOLLI Lion      Or    LORazepam  2 mg Intramuscular Q6H PRN HOLLI Lion      hydrOXYzine HCL  25 mg Oral Q6H PRN Max 4/day [facility] Holter, DO      ibuprofen  600 mg Oral Q8H PRN HOLLI Lion      influenza vaccine  0.5 mL Intramuscular Once Bora Rosario MD      loratadine  10 mg Oral Daily Brina Guillen MD      magnesium hydroxide  30 mL Oral Once Jourdan Dickens, DO      melatonin  3 mg Oral HS PRN Bora Rosario MD      melatonin  9 mg Oral HS Bora Rosario MD      methocarbamol  500 mg Oral Q6H PRN HOLLI Lion      multivitamin-minerals  1 tablet Oral Daily Eileenaddie CherryHOLLI Jimenez      nicotine polacrilex  2 mg Oral Q4H PRN Bora Rosario MD      OLANZapine  5 mg Oral Q4H PRN Max 3/day [facility] Holter, DO      Or    OLANZapine  2.5 mg Intramuscular Q4H PRN Max 3/day [facility] Holter, DO      OLANZapine  5 mg Oral Q3H PRN Max 3/day [facility] Holter, DO      Or    OLANZapine  5 mg Intramuscular Q3H PRN Max 3/day [facility] Holter, DO      OLANZapine  2.5 mg Oral Q4H PRN Max 6/day [facility] Holter, DO      ondansetron  4 mg Oral Q6H PRN HOLLI Lion      pantoprazole  20 mg Oral QAM [facility] Holter, DO      polyethylene glycol  17 g Oral Daily PRN HOLLI Ghotra      polyethylene glycol  17 g Oral Daily Jourdan Dickens, DO      propranolol  10 mg Oral Q12H KAYLIE HOLLI Lion      senna-docusate sodium  1 tablet Oral Daily PRN [facility] Holter, DO      senna-docusate sodium  2 tablet Oral HS Jourdan Dickens, DO      traZODone  50 mg Oral HS PRN HOLLI Lion      white petrolatum-mineral oil   Topical TID PRN HOLLI Lion         Counseling / Coordination of Care: Total floor / unit time spent today 15 minutes. Greater than 50% of total time was spent with the patient and / or family counseling and / or somewhat receptive to supportive listening and teaching positive coping skills to deal with symptom mangement.     Patient's Rights,  confidentiality and exceptions to confidentiality, use of automated medical record, Behavioral Health Services staff access to medical record, and consent to treatment reviewed.    This note has been dictated and hence there may be problems with punctuation, spelling and formatting and if anyone has any concerns please address them to Dr. Mora.  This note is not shared with patient due to potential for making patient's condition worse by knowing the content of the note.    VERONICA FloresS

## 2024-11-26 NOTE — NURSING NOTE
Pt is calm, cooperative and visible on milieu. Pt reports AH, depression and anxiety; denies SI/HI/VH. Pt social with select peers and reports sleeping well. Pt is able to make needs known and is medication compliant. Q 15 min checks maintained.

## 2024-11-26 NOTE — ASSESSMENT & PLAN NOTE
Reviewed on 11/26/24  Ongoing by medical  No associated orders from this encounter found during lookback period of 72 hours.

## 2024-11-26 NOTE — ASSESSMENT & PLAN NOTE
Reviewed on 11/26/24  Continue to encourage cessation upon discharge  No associated orders from this encounter found during lookback period of 72 hours.

## 2024-11-26 NOTE — ASSESSMENT & PLAN NOTE
Reviewed on 11/26/24  Follows with medical  Continue famotidine 20 mg tablet BID per medical team   Continue glycopyrrolate 1 mg tablet BID AM and afternoon per medical team  Continue glycopyrrolate 2 mg tablet before bed per medical team  Continue pantoprazole EC 20 mg tablet every morning per medical team  No associated orders from this encounter found during lookback period of 72 hours.

## 2024-11-26 NOTE — PROGRESS NOTES
11/26/24 0815   Team Meeting   Meeting Type Daily Rounds   Team Members Present   Team Members Present Physician;Nurse;;Other (Discipline and Name)   Physician Team Member Holter, Sharma   Nursing Team Member Claudia   Social Work Team Member Jose J ORTEGA   Other (Discipline and Name) Wang PCM   Patient/Family Present   Patient Present No   Patient's Family Present No     Groups Participation  7/8.   Patient's compliant with medications. He's engaged and interacts with his peers. Family visit on 11/27. Podiatry consult completed. Reports AH and uses his coping skills.

## 2024-11-26 NOTE — ASSESSMENT & PLAN NOTE
Reviewed on 11/26/24  Follows with medical  No associated orders from this encounter found during lookback period of 72 hours.

## 2024-11-26 NOTE — ASSESSMENT & PLAN NOTE
Reviewed on 11/26/24  Follows with medical  Continue senna-docusate sodium dose per medical team.   No associated orders from this encounter found during lookback period of 72 hours.

## 2024-11-26 NOTE — SOCIAL WORK
Patient is transferred to [facility] after being inpatient at [facility] from 11/14/2023-3/29/2024. Patient presents with dysphoric mood including depression, depressive signs/symptoms that include suicidal ideation in addition to signs/symptoms of psychosis including auditory hallucinations that tell him that he and his relatives are going to die. Patient has a significant history of psychiatric illness including several previous inpatient behavioral health admissions. Patient has been transferred to the [facility] to continue his ECT treatments and stabilize before returning to the community.    6/24: Maintenance ECT treatments   7/23: ACT referral submitted for [place]    8/14: BRANDY facilitated a family session with pt's aunt and sister (via phone call) when aunt came in person. Family reported to pt during this session that they feel more comfortable if he goes to a group home and do not feel they can support him at home   8/15: CRR referral completed and submitted to [facility].   9/12: BRANDY sent email to pt's aunt inquiring about status of her willingness to take pt home. BRANDY notified aunt in writing that current CRR waiting lists are several months long.   9/26: Attempted phone call to pt's aunt Hanh [phone]. Call rings but does not go to [facility]. BRANDY typed letter to aunt requesting communication and collaboration on pt's discharge planning. Sent via postal mail.  9/30: BRANDY placed call to pt's aunt. Aunt reports pt can now come home and live with her. Lost [place] benefits, only has Medicare  10/11: Discussed RENETTA Khan with sister/Rep Payee Tere ([phone])  10/18:  SW spoke with patient's sister Tere. She reports she spoke with RENETTA Khan, however patient's Medicaid benefits will still be affected because patient receives over the limit for SSDI as this is his primary source of income. Patient receives $1780 as SSDI. This writer discussed county has placed patient on the [facility] funding  wait list for ACT services. Sister reports patient needs ACT services for him to recover in the community.   10/25: Email to [facility] Representative Monica Griffin, requesting an [facility] on [facility] for [facility] services.  11/18:  forwarded ACT referral to Khoa Casper with [facility].

## 2024-11-26 NOTE — NURSING NOTE
Pt is visible on the unit and social with select peers. Consumed 100% of dinner. Took medications without incidence. Pt is pleasant and cooperative. Attended 11/12 groups. Reports continued AH that threaten to kill him and his family, he also reports anxiety and depression but exercising and using the punching bag have been effective coping skills. Denies SI/HI/VH. No behavioral issues. Pt offers no complaints.

## 2024-11-27 PROCEDURE — 99232 SBSQ HOSP IP/OBS MODERATE 35: CPT | Performed by: PSYCHIATRY & NEUROLOGY

## 2024-11-27 RX ADMIN — DOCUSATE SODIUM 100 MG: 100 CAPSULE, LIQUID FILLED ORAL at 08:55

## 2024-11-27 RX ADMIN — NICOTINE POLACRILEX 2 MG: 2 GUM, CHEWING ORAL at 21:24

## 2024-11-27 RX ADMIN — MELATONIN TAB 3 MG 9 MG: 3 TAB at 21:24

## 2024-11-27 RX ADMIN — ATROPINE SULFATE 1 DROP: 10 SOLUTION/ DROPS OPHTHALMIC at 21:27

## 2024-11-27 RX ADMIN — SENNOSIDES AND DOCUSATE SODIUM 2 TABLET: 50; 8.6 TABLET ORAL at 21:25

## 2024-11-27 RX ADMIN — NICOTINE POLACRILEX 2 MG: 2 GUM, CHEWING ORAL at 08:55

## 2024-11-27 RX ADMIN — PROPRANOLOL HYDROCHLORIDE 10 MG: 10 TABLET ORAL at 21:26

## 2024-11-27 RX ADMIN — GLYCOPYRROLATE 2 MG: 1 TABLET ORAL at 21:25

## 2024-11-27 RX ADMIN — LORATADINE 10 MG: 10 TABLET ORAL at 08:55

## 2024-11-27 RX ADMIN — NICOTINE POLACRILEX 2 MG: 2 GUM, CHEWING ORAL at 17:08

## 2024-11-27 RX ADMIN — GLYCOPYRROLATE 1 MG: 1 TABLET ORAL at 08:55

## 2024-11-27 RX ADMIN — MULTIPLE VITAMINS W/ MINERALS TAB 1 TABLET: TAB ORAL at 08:56

## 2024-11-27 RX ADMIN — ARIPIPRAZOLE 30 MG: 15 TABLET ORAL at 08:57

## 2024-11-27 RX ADMIN — PROPRANOLOL HYDROCHLORIDE 10 MG: 10 TABLET ORAL at 08:55

## 2024-11-27 RX ADMIN — DOCUSATE SODIUM 100 MG: 100 CAPSULE, LIQUID FILLED ORAL at 17:08

## 2024-11-27 RX ADMIN — GLYCOPYRROLATE 1 MG: 1 TABLET ORAL at 14:49

## 2024-11-27 RX ADMIN — HYDROCHLOROTHIAZIDE 12.5 MG: 12.5 TABLET ORAL at 08:56

## 2024-11-27 RX ADMIN — ESCITALOPRAM OXALATE 20 MG: 10 TABLET, FILM COATED ORAL at 08:56

## 2024-11-27 RX ADMIN — AMLODIPINE BESYLATE 5 MG: 5 TABLET ORAL at 08:55

## 2024-11-27 RX ADMIN — CLOZAPINE 225 MG: 25 TABLET ORAL at 21:25

## 2024-11-27 RX ADMIN — PANTOPRAZOLE SODIUM 20 MG: 20 TABLET, DELAYED RELEASE ORAL at 08:56

## 2024-11-27 NOTE — SOCIAL WORK
This writer met with the patient for an individual session. Patient reports he's doing well. He discussed his continued AH and depression. He discussed the coping strategies he is using here and his intention to use these skills and other in the community. This writer requested patient obtain his Aunt Mala's home address in order to file for his State ID renewal. This writer will assist patient to order to his State ID.

## 2024-11-27 NOTE — PROGRESS NOTES
11/27/24 0816   Team Meeting   Meeting Type Daily Rounds   Team Members Present   Team Members Present Physician;Nurse;;Other (Discipline and Name)   Physician Team Member Holter, Sharma   Nursing Team Member Claudia   Social Work Team Member Jose J ORTEGA   Other (Discipline and Name) Wang PCM   Patient/Family Present   Patient Present No   Patient's Family Present No     Groups Participation  /12.   Patient's compliant with medications. He's engaged in his treatment. Family visit today. He's pleasant and cooperative. Awaiting [facility] funding for [facility] services.

## 2024-11-27 NOTE — ASSESSMENT & PLAN NOTE
Reviewed on 11/27/24  Follows with medical  Continue senna-docusate sodium dose per medical team.   No associated orders from this encounter found during lookback period of 72 hours.

## 2024-11-27 NOTE — PLAN OF CARE
Problem: Alteration in Thoughts and Perception  Goal: Refrain from acting on delusional thinking/internal stimuli  Description: Interventions:  - Monitor patient closely, per order   - Utilize least restrictive measures   - Set reasonable limits, give positive feedback for acceptable   - Administer medications as ordered and monitor of potential side effects  Outcome: Progressing  Goal: Agree to be compliant with medication regime, as prescribed and report medication side effects  Description: Interventions:  - Offer appropriate PRN medication and supervise ingestion; conduct AIMS, as needed   Outcome: Progressing  Goal: Recognize dysfunctional thoughts, communicate reality-based thoughts at the time of discharge  Description: Interventions:  - Provide medication and psycho-education to assist patient in compliance and developing insight into his/her illness   Outcome: Progressing  Goal: Complete daily ADLs, including personal hygiene independently, as able  Description: Interventions:  - Observe, teach, and assist patient with ADLS  - Monitor and promote a balance of rest/activity, with adequate nutrition and elimination   Outcome: Progressing     Problem: Ineffective Coping  Goal: Identifies healthy coping skills  Outcome: Progressing  Goal: Demonstrates healthy coping skills  Outcome: Progressing  Goal: Participates in unit activities  Description: Interventions:  - Provide therapeutic environment   - Provide required programming   - Redirect inappropriate behaviors   Outcome: Progressing     Problem: Depression  Goal: Verbalize thoughts and feelings  Description: Interventions:  - Assess and re-assess patient's level of risk   - Engage patient in 1:1 interactions, daily, for a minimum of 15 minutes   - Encourage patient to express feelings, fears, frustrations, hopes   Outcome: Progressing  Goal: Refrain from harming self  Description: Interventions:  - Monitor patient closely, per order   - Supervise  medication ingestion, monitor effects and side effects   Outcome: Progressing  Goal: Refrain from isolation  Description: Interventions:  - Develop a trusting relationship   - Encourage socialization   Outcome: Progressing     Problem: Anxiety  Goal: Anxiety is at manageable level  Description: Interventions:  - Assess and monitor patient's anxiety level.   - Monitor for signs and symptoms (heart palpitations, chest pain, shortness of breath, headaches, nausea, feeling jumpy, restlessness, irritable, apprehensive).   - Collaborate with interdisciplinary team and initiate plan and interventions as ordered.  - [place] patient to unit/surroundings  - Explain treatment plan  - Encourage participation in care  - Encourage verbalization of concerns/fears  - Identify coping mechanisms  - Assist in developing anxiety-reducing skills  - Administer/offer alternative therapies  - Limit or eliminate stimulants  Outcome: Progressing     Problem: Alteration in Orientation  Goal: Complete daily ADLs, including personal hygiene independently, as able  Description: Interventions:  - Observe, teach, and assist patient with ADLS  Outcome: Progressing

## 2024-11-27 NOTE — NURSING NOTE
Pt is calm, cooperative and visible on milieu. Pt reports AH, depression and anxiety; denies SI/HI/VH. Pt states he is looking forward to his family visiting today. Pt interacts appropriately with peers and reports sleeping well. Pt is able to make needs known and is medication compliant. Q 15 min checks maintained.

## 2024-11-27 NOTE — ASSESSMENT & PLAN NOTE
Reviewed on 11/27/24  Ongoing by medical  No associated orders from this encounter found during lookback period of 72 hours.

## 2024-11-27 NOTE — ASSESSMENT & PLAN NOTE
Reviewed on 11/27/24  Follows with medical  Continue famotidine 20 mg tablet BID per medical team   Continue glycopyrrolate 1 mg tablet BID AM and afternoon per medical team  Continue glycopyrrolate 2 mg tablet before bed per medical team  Continue pantoprazole EC 20 mg tablet every morning per medical team  No associated orders from this encounter found during lookback period of 72 hours.

## 2024-11-27 NOTE — PROGRESS NOTES
"Progress Note - Behavioral Health   Name: Alberto Berumen 29 y.o. male I MRN: [id number]  Unit/Bed#: [facility] 112-02 I Date of Admission: 3/29/2024   Date of Service: 11/27/2024 I Hospital Day: 243     Assessment & Plan  Schizoaffective disorder, bipolar type (HCC)  Reviewed on 11/27/24    Patient reports feelings of depression and anxiety, racing thoughts, hopelessness, low-self esteem, and low energy. He states his motivation is \"medium\". He states he slept \"well\" last night. He states his mood is \"tired and depressed\". He states his depression is a 7/10 this morning. He states he is depressed and anxious due to the voices in his head constantly telling him they are going to kill him and his family and that he is going to hell. He states his anxiety is a 6/10 this morning. He states the voices are random people and not his own voice. He states he has been hearing this voices constantly for over 2 years. He states the voices have not said anything new recently. He states \"working out\", walking and talking with peers and listening to music helps him cope with the voices and he used the punching bag yesterday which helped. He denies any visual hallucinations. He reports persecutory delusions in regards to the voices he is hearing but states he does not think anyone here on the unit is going to hurt him and he feels safe here. He denies grandiose, somatic, or bizarre delusions. He reports passive death wishes \"sometimes\" and when asked if he has them currently he said \"I don't know\". He denies thoughts of harm to self and others, SI, or HI.     Continue Clozapine 225 mg daily at bedtime for psychosis -- to consider further careful titration for Sxs depending on response  Continue to obtain CBC with diff, CRP, and CK (weekly on Tues), and a Clozapine level and BNP (every 2 weeks on Tues)    Continue Abilify 30 mg once daily for psychosis  Continue Escitalopram 20 mg once daily for depression and anxiety  Continue " Senna-Docusate sodium one 50 mg tablet daily before bed for constipation  - decreased from two tablets daily on 11/3 due to reported loose stools  Continue propanolol 10 mg twice daily for anxiety   Continue amlodipine 5 mg PO daily     Pt remains on dual antipsychotic Tx due to failure on monotherapy      ACT team referral was made  No associated orders from this encounter found during lookback period of 72 hours.    Chronic idiopathic constipation  Reviewed on 11/27/24  Follows with medical  Continue senna-docusate sodium dose per medical team.   No associated orders from this encounter found during lookback period of 72 hours.  GERD (gastroesophageal reflux disease)  Reviewed on 11/27/24  Follows with medical  Continue famotidine 20 mg tablet BID per medical team   Continue glycopyrrolate 1 mg tablet BID AM and afternoon per medical team  Continue glycopyrrolate 2 mg tablet before bed per medical team  Continue pantoprazole EC 20 mg tablet every morning per medical team  No associated orders from this encounter found during lookback period of 72 hours.  Medical clearance for psychiatric admission  Reviewed on 11/27/24  Ongoing by medical  No associated orders from this encounter found during lookback period of 72 hours.    Tobacco abuse  Reviewed on 11/27/24  Continue to encourage cessation upon discharge  No associated orders from this encounter found during lookback period of 72 hours.  Elevated hemoglobin A1c  Reviewed on 11/27/24  Follows with medical  No associated orders from this encounter found during lookback period of 72 hours.  Class 2 obesity in adult  Reviewed on 11/27/24  Follows with medical  No associated orders from this encounter found during lookback period of 72 hours.    Primary hypertension  Reviewed on 11/27/24  Follows with medical  No associated orders from this encounter found during lookback period of 72 hours.    Psychiatry Progress Note [facility]    Progress  towards goals: Progressing    Review of systems: Unremarkable    Psychiatric Diagnosis: Schizoaffective disorder, bipolar type    Assessment  Overall Status:  Status quo- continues to feel anxious and depressed regarding threatening voices  Certification Statement: The patient will continue to require additional inpatient hospital stay due to schizoaffective disorder, bipolar type and related symptoms       Medications: as above  Side effects from treatment: Denied  Medication changes: as above  Medication education: Risks side effects benefits and precautions of medications discussed with patient and they did verbalize an understanding about risks for metabolic syndrome from being on neuroleptics and tardive dyskinesia etc.  All medications reviewed and I recommend that they be continued for symptom management  Understanding of medications: Risks side effects benefits and precautions of medications discussed with patient and they did verbalize an understanding about risks for metabolic syndrome from being on neuroleptics and tardive dyskinesia etc., and patient appeared to have understanding.  Justification for dual anti-psychotics: Due to failure on monotherapy    Non-pharmacological treatments  Continue with individual, group, milieu and occupational therapy using recovery principles and psycho-education about accepting illness and the need for treatment.  Behavioral health checks every 7 minutes    Safety  Safety and communication plan established to target dynamic risk factors discussed above.    Discharge Plan   To live with aunt once [facility] approves funding on ACT team    Interval Progress: Per treatment team, patient is compliant with meals and medications. He is pleasant, cooperative and social with peers on unit. He did not require any PRN psychotropic medications yesterday or this morning. He used the punching bag yesterday to help cope with the voices he hears. He attended 11/12 groups  "yesterday.    Today, patient evaluated by this writer and Dr. Holter in [facility]. He states his mood is \"tired and depressed\" due to the constant voices he hears telling him they are going to kill him and his family and that he is going to hell. He rates his depression as 7/10 this morning. He states the voices have been constant for the past 2 years but he is trying to find positive ways to cope with them. He states working out, walking and talking with friends, and listening to music helps him cope with the voices. He states he used the punching bag yesterday which helped. He denies visual hallucinations. He reports persecutory delusions regarding the threatening voices but states he does not think anyone here on the unit is going to hurt him and he feels safe here. He denies any grandiose, somatic, or bizarre delusions. He reports feelings of depression, anxiety, hopelessness, racing thoughts, low energy, and low self-esteem. He states his motivation today is \"medium\". He rates his anxiety as 6/10 this morning. He reports passive death wishes \"sometimes\" and when asked if he has them currently he said \"I don't know\". He denies thoughts to harm self or others, SI or HI. He states he slept \"well\" but is still tired today. He states he is eating well and has a good appetite. He is looking forward to his in-person visit with his aunt and cousin today. He denies any medication side effects.     Acceptance by patient: Accepting of medications and treatments  Hopefulness in recovery: Hopeful about living with aunt upon discharge  Involved in reintegration process: Patient communication with aunt and sister on phone- plan for in-person visit today with aunt and cousin.  Trusting in relationship with psychiatrist: Present  Sleep: Good  Appetite: Good  Compliance with Medications: Compliant  Group attendance: 11/12 groups yesterday  Significant events: None in the past 24 hours    Mental Status Exam  Appearance: age " "appropriate, casually dressed, dressed appropriately  Behavior: cooperative, calm, good  eye contact  Speech: normal rate, normal pitch, decreased volume, not pressured  Mood: \"tired and depressed\"  Affect: constricted, mood-congruent  Thought Process: organized, logical, coherent, goal directed, linear  Thought Content: Mild paranoia regarding threatening voices he hears  Perceptual Disturbances: Auditory hallucinations of voices saying they are going to kill him and his family. Denies visual hallucinations when asked. Does not appear to be responding to internal stimuli  Sensorium: alert and oriented to person, place, time and situation  Cognition: recent and remote memory grossly intact  Consciousness: alert and awake  Attention: attention span and concentration are age appropriate  Intellect: appears to be of average intelligence  Insight: fair  Judgement: fair  Motor Activity: no abnormal movements     Vitals  Temp:  [97 °F (36.1 °C)-97.8 °F (36.6 °C)] 97 °F (36.1 °C)  HR:  [88-92] 88  Resp:  [18] 18  BP: (120-132)/(61-72) 120/72  SpO2:  [94 %-98 %] 94 %  No intake or output data in the 24 hours ending 11/27/24 1017    Lab Results: All Labs For Current Hospital Admission Reviewed  Results from last 7 days   Lab Units 11/26/24  0948   WBC Thousand/uL 8.88   RBC Million/uL 5.54   HEMOGLOBIN g/dL 13.0   HEMATOCRIT % 43.4   MCV fL 78*   PLATELETS Thousands/uL 272   TOTAL NEUT ABS Thousands/µL 4.71       Current Facility-Administered Medications   Medication Dose Route Frequency Provider Last Rate    acetaminophen  650 mg Oral Q4H PRN Jordan C Holter, DO      acetaminophen  650 mg Oral Q6H PRN HOLLI Lion      aluminum-magnesium hydroxide-simethicone  30 mL Oral Q4H PRN Jordan C Holter, DO      amLODIPine  5 mg Oral Daily HOLLI Lion      ARIPiprazole  30 mg Oral Daily Bora Rosario MD      Artificial Tears  1 drop Both Eyes Q3H PRN Jordan C Holter, DO      atropine  1 drop Sublingual HS Harjeet " Melissa, DO      haloperidol lactate  2.5 mg Intramuscular Q4H PRN Max 4/day STEVE LionNP      And    LORazepam  1 mg Intramuscular Q4H PRN Max 4/day STEVE LionNP      And    benztropine  0.5 mg Intramuscular Q4H PRN Max 4/day HOLLI Lion      benztropine  1 mg Intramuscular Q4H PRN Max 6/day Jordan C Holter, DO      haloperidol lactate  5 mg Intramuscular Q4H PRN Max 4/day STEVE LionNP      And    LORazepam  2 mg Intramuscular Q4H PRN Max 4/day STEVE LionNP      And    benztropine  1 mg Intramuscular Q4H PRN Max 4/day HOLLI Lion      benztropine  1 mg Oral Q4H PRN Max 6/day HOLLI Lion      benztropine  1 mg Oral Q4H PRN Max 6/day Jordan C Holter, DO      bisacodyl  10 mg Rectal Daily PRN HOLLI Lion      calcium carbonate  500 mg Oral BID PRN HOLLI Monge      cloZAPine  225 mg Oral HS Hardik So MD      hydrOXYzine HCL  50 mg Oral Q6H PRN Max 4/day Jordan C Holter, DO      Or    diphenhydrAMINE  50 mg Intramuscular Q6H PRN Jordan C Holter, DO      hydrOXYzine HCL  50 mg Oral Q6H PRN Max 4/day HOLLI Lion      Or    diphenhydrAMINE  50 mg Intramuscular Q6H PRN HOLLI Lion      diphenhydrAMINE-zinc acetate   Topical BID PRN HOLLI Lion      docusate sodium  100 mg Oral BID Jourdan Dickens, DO      escitalopram  20 mg Oral Daily HOLLI Lion      famotidine  20 mg Oral BID PRN HOLLI Monge      fluticasone  1 spray Each Nare Daily PRN HOLLI Monge      glycopyrrolate  1 mg Oral BID (AM & Afternoon) Bora Rosario MD      glycopyrrolate  2 mg Oral HS Bora Rosario MD      haloperidol  1 mg Oral Q6H PRN HOLLI Lion      haloperidol  2.5 mg Oral Q4H PRN Max 4/day Eveline Hangey, CRNP      haloperidol  5 mg Oral Q4H PRN Max 4/day HOLLI Lion      hydroCHLOROthiazide  12.5 mg Oral Daily HOLLI Galvan      hydrocortisone   Topical 4x Daily PRN Eveline  HOLLI Hunt      hydrOXYzine HCL  100 mg Oral Q6H PRN Max 4/day [facility] Cresencioter, DO      Or    LORazepam  2 mg Intramuscular Q6H PRN [facility] Holter, DO      hydrOXYzine HCL  100 mg Oral Q6H PRN Max 4/day HOLLI Lion      Or    LORazepam  2 mg Intramuscular Q6H PRN HOLLI Lion      hydrOXYzine HCL  25 mg Oral Q6H PRN Max 4/day [facility] Cresencioter, DO      ibuprofen  600 mg Oral Q8H PRN HOLLI Lion      influenza vaccine  0.5 mL Intramuscular Once Bora Rosario MD      loratadine  10 mg Oral Daily Brina Guillen MD      magnesium hydroxide  30 mL Oral Once Jourdan Dickens, DO      melatonin  3 mg Oral HS PRN Bora Rosario MD      melatonin  9 mg Oral HS Bora Rosario MD      methocarbamol  500 mg Oral Q6H PRN HOLLI Lion      multivitamin-minerals  1 tablet Oral Daily Eileen CherryHOLLI Jimenez      nicotine polacrilex  2 mg Oral Q4H PRN Bora Rosario MD      OLANZapine  5 mg Oral Q4H PRN Max 3/day [facility] Holter, DO      Or    OLANZapine  2.5 mg Intramuscular Q4H PRN Max 3/day [facility] Holter, DO      OLANZapine  5 mg Oral Q3H PRN Max 3/day [facility] Holter, DO      Or    OLANZapine  5 mg Intramuscular Q3H PRN Max 3/day [facility] Holter, DO      OLANZapine  2.5 mg Oral Q4H PRN Max 6/day [facility] Holter, DO      ondansetron  4 mg Oral Q6H PRN HOLLI Lion      pantoprazole  20 mg Oral [facility] Cresencioter, DO      polyethylene glycol  17 g Oral Daily PRN HOLLI Ghotra      polyethylene glycol  17 g Oral Daily Jourdan Dickens, DO      propranolol  10 mg Oral Q12H KAYLIE HOLLI Lion      senna-docusate sodium  1 tablet Oral Daily PRN Ion  Holter, DO      senna-docusate sodium  2 tablet Oral HS Jourdan Dickens, DO      traZODone  50 mg Oral HS PRN HOLLI Lion      white petrolatum-mineral oil   Topical TID PRN HOLLI Lion         Counseling / Coordination of Care: Total floor / unit time spent today 15 minutes. Greater than 50% of total time was spent with the  patient and / or family counseling and / or somewhat receptive to supportive listening and teaching positive coping skills to deal with symptom mangement.     Patient's Rights, confidentiality and exceptions to confidentiality, use of automated medical record, Behavioral Health Services staff access to medical record, and consent to treatment reviewed.    This note has been dictated and hence there may be problems with punctuation, spelling and formatting and if anyone has any concerns please address them to Dr. Mora.  This note is not shared with patient due to potential for making patient's condition worse by knowing the content of the note.    VERONICA FloresS

## 2024-11-27 NOTE — ASSESSMENT & PLAN NOTE
Reviewed on 11/27/24  Continue to encourage cessation upon discharge  No associated orders from this encounter found during lookback period of 72 hours.

## 2024-11-27 NOTE — ASSESSMENT & PLAN NOTE
Reviewed on 11/27/24  Follows with medical  No associated orders from this encounter found during lookback period of 72 hours.

## 2024-11-27 NOTE — PROGRESS NOTES
11/27/24 1400   Activity/Group Checklist   Group Other (Comment)  (Mindfulness coping)   Attendance Attended   Attendance Duration (min) 31-45   Interactions Interacted appropriately   Affect/Mood Appropriate   Goals Achieved Able to listen to others;Able to engage in interactions;Other (Comment);Verbalized increased hopefulness  (mentions that he excited for his visit with family members and that he will shower after working out.)

## 2024-11-27 NOTE — ASSESSMENT & PLAN NOTE
"Reviewed on 11/27/24    Patient reports feelings of depression and anxiety, racing thoughts, hopelessness, low-self esteem, and low energy. He states his motivation is \"medium\". He states he slept \"well\" last night. He states his mood is \"tired and depressed\". He states his depression is a 7/10 this morning. He states he is depressed and anxious due to the voices in his head constantly telling him they are going to kill him and his family and that he is going to hell. He states his anxiety is a 6/10 this morning. He states the voices are random people and not his own voice. He states he has been hearing this voices constantly for over 2 years. He states the voices have not said anything new recently. He states \"working out\", walking and talking with peers and listening to music helps him cope with the voices and he used the punching bag yesterday which helped. He denies any visual hallucinations. He reports persecutory delusions in regards to the voices he is hearing but states he does not think anyone here on the unit is going to hurt him and he feels safe here. He denies grandiose, somatic, or bizarre delusions. He reports passive death wishes \"sometimes\" and when asked if he has them currently he said \"I don't know\". He denies thoughts of harm to self and others, SI, or HI.     Continue Clozapine 225 mg daily at bedtime for psychosis -- to consider further careful titration for Sxs depending on response  Continue to obtain CBC with diff, CRP, and CK (weekly on Tues), and a Clozapine level and BNP (every 2 weeks on Tues)    Continue Abilify 30 mg once daily for psychosis  Continue Escitalopram 20 mg once daily for depression and anxiety  Continue Senna-Docusate sodium one 50 mg tablet daily before bed for constipation  - decreased from two tablets daily on 11/3 due to reported loose stools  Continue propanolol 10 mg twice daily for anxiety   Continue amlodipine 5 mg PO daily     Pt remains on dual antipsychotic Tx " due to failure on monotherapy      ACT team referral was made  No associated orders from this encounter found during lookback period of 72 hours.

## 2024-11-28 PROCEDURE — 99232 SBSQ HOSP IP/OBS MODERATE 35: CPT | Performed by: PHYSICIAN ASSISTANT

## 2024-11-28 RX ADMIN — DOCUSATE SODIUM 100 MG: 100 CAPSULE, LIQUID FILLED ORAL at 17:14

## 2024-11-28 RX ADMIN — NICOTINE POLACRILEX 2 MG: 2 GUM, CHEWING ORAL at 13:44

## 2024-11-28 RX ADMIN — CLOZAPINE 225 MG: 25 TABLET ORAL at 21:02

## 2024-11-28 RX ADMIN — NICOTINE POLACRILEX 2 MG: 2 GUM, CHEWING ORAL at 21:45

## 2024-11-28 RX ADMIN — PROPRANOLOL HYDROCHLORIDE 10 MG: 10 TABLET ORAL at 21:02

## 2024-11-28 RX ADMIN — MULTIPLE VITAMINS W/ MINERALS TAB 1 TABLET: TAB ORAL at 09:10

## 2024-11-28 RX ADMIN — LORATADINE 10 MG: 10 TABLET ORAL at 09:11

## 2024-11-28 RX ADMIN — PANTOPRAZOLE SODIUM 20 MG: 20 TABLET, DELAYED RELEASE ORAL at 09:10

## 2024-11-28 RX ADMIN — ATROPINE SULFATE 1 DROP: 10 SOLUTION/ DROPS OPHTHALMIC at 21:03

## 2024-11-28 RX ADMIN — GLYCOPYRROLATE 1 MG: 1 TABLET ORAL at 09:11

## 2024-11-28 RX ADMIN — GLYCOPYRROLATE 1 MG: 1 TABLET ORAL at 13:44

## 2024-11-28 RX ADMIN — GLYCOPYRROLATE 2 MG: 1 TABLET ORAL at 21:02

## 2024-11-28 RX ADMIN — NICOTINE POLACRILEX 2 MG: 2 GUM, CHEWING ORAL at 09:16

## 2024-11-28 RX ADMIN — SENNOSIDES AND DOCUSATE SODIUM 2 TABLET: 50; 8.6 TABLET ORAL at 21:02

## 2024-11-28 RX ADMIN — MELATONIN TAB 3 MG 9 MG: 3 TAB at 21:02

## 2024-11-28 RX ADMIN — ESCITALOPRAM OXALATE 20 MG: 10 TABLET, FILM COATED ORAL at 09:09

## 2024-11-28 RX ADMIN — ARIPIPRAZOLE 30 MG: 15 TABLET ORAL at 09:11

## 2024-11-28 RX ADMIN — DOCUSATE SODIUM 100 MG: 100 CAPSULE, LIQUID FILLED ORAL at 09:10

## 2024-11-28 RX ADMIN — NICOTINE POLACRILEX 2 MG: 2 GUM, CHEWING ORAL at 17:44

## 2024-11-28 NOTE — NURSING NOTE
0546:  Received pt in bed at change of shift with eyes closed; chest movement noted.  Continues the same thus this far as per q 15 min room checks.   Will continue to monitor behavior, sleeping pattern and any medical issues that may arise.     Sleeping 7+ hrs thus this far

## 2024-11-28 NOTE — PLAN OF CARE
Problem: Alteration in Thoughts and Perception  Goal: Treatment Goal: Gain control of psychotic behaviors/thinking, reduce/eliminate presenting symptoms and demonstrate improved reality functioning upon discharge  Outcome: Progressing  Goal: Refrain from acting on delusional thinking/internal stimuli  Description: Interventions:  - Monitor patient closely, per order   - Utilize least restrictive measures   - Set reasonable limits, give positive feedback for acceptable   - Administer medications as ordered and monitor of potential side effects  Outcome: Progressing  Goal: Agree to be compliant with medication regime, as prescribed and report medication side effects  Description: Interventions:  - Offer appropriate PRN medication and supervise ingestion; conduct AIMS, as needed   Outcome: Progressing  Goal: Recognize dysfunctional thoughts, communicate reality-based thoughts at the time of discharge  Description: Interventions:  - Provide medication and psycho-education to assist patient in compliance and developing insight into his/her illness   Outcome: Progressing  Goal: Complete daily ADLs, including personal hygiene independently, as able  Description: Interventions:  - Observe, teach, and assist patient with ADLS  - Monitor and promote a balance of rest/activity, with adequate nutrition and elimination   Outcome: Progressing     Problem: Ineffective Coping  Goal: Participates in unit activities  Description: Interventions:  - Provide therapeutic environment   - Provide required programming   - Redirect inappropriate behaviors   Outcome: Progressing  Goal: Patient/Family participate in treatment and DC plans  Description: Interventions:  - Provide therapeutic environment  Outcome: Progressing  Goal: Patient/Family verbalizes awareness of resources  Outcome: Progressing     Problem: Depression  Goal: Treatment Goal: Demonstrate behavioral control of depressive symptoms, verbalize feelings of improved  mood/affect, and adopt new coping skills prior to discharge  Outcome: Progressing  Goal: Verbalize thoughts and feelings  Description: Interventions:  - Assess and re-assess patient's level of risk   - Engage patient in 1:1 interactions, daily, for a minimum of 15 minutes   - Encourage patient to express feelings, fears, frustrations, hopes   Outcome: Progressing  Goal: Refrain from harming self  Description: Interventions:  - Monitor patient closely, per order   - Supervise medication ingestion, monitor effects and side effects   Outcome: Progressing  Goal: Refrain from isolation  Description: Interventions:  - Develop a trusting relationship   - Encourage socialization   Outcome: Progressing  Goal: Refrain from self-neglect  Outcome: Progressing  Goal: Attend and participate in unit activities, including therapeutic, recreational, and educational groups  Description: Interventions:  - Provide therapeutic and educational activities daily, encourage attendance and participation, and document same in the medical record   Outcome: Progressing     Problem: Anxiety  Goal: Anxiety is at manageable level  Description: Interventions:  - Assess and monitor patient's anxiety level.   - Monitor for signs and symptoms (heart palpitations, chest pain, shortness of breath, headaches, nausea, feeling jumpy, restlessness, irritable, apprehensive).   - Collaborate with interdisciplinary team and initiate plan and interventions as ordered.  - Sylvia patient to unit/surroundings  - Explain treatment plan  - Encourage participation in care  - Encourage verbalization of concerns/fears  - Identify coping mechanisms  - Assist in developing anxiety-reducing skills  - Administer/offer alternative therapies  - Limit or eliminate stimulants  Outcome: Progressing     Problem: Alteration in Orientation  Goal: Treatment Goal: Demonstrate a reduction of confusion and improved orientation to person, place, time and/or situation upon discharge,  according to optimum baseline/ability  Outcome: Progressing  Goal: Interact with staff daily  Description: Interventions:  - Assess and re-assess patient's level of orientation  - Engage patient in 1 on 1 interactions, daily, for a minimum of 15 minutes   - Establish rapport/trust with patient   Outcome: Progressing  Goal: Express concerns related to confused thinking related to:  Description: Interventions:  - Encourage patient to express feelings, fears, frustrations, hopes  - Assign consistent caregivers   - [place]/re-orient patient as needed  - Allow comfort items, as appropriate  - Provide visual cues, signs, etc.   Outcome: Progressing  Goal: Allow medical examinations, as recommended  Description: Interventions:  - Provide physical/neurological exams and/or referrals, per provider   Outcome: Progressing  Goal: Cooperate with recommended testing/procedures  Description: Interventions:  - Determine need for ancillary testing  - Observe for mental status changes  - Implement falls/precaution protocol   Outcome: Progressing  Goal: Complete daily ADLs, including personal hygiene independently, as able  Description: Interventions:  - Observe, teach, and assist patient with ADLS  Outcome: Progressing     Problem: DISCHARGE PLANNING - CARE MANAGEMENT  Goal: Discharge to post-acute care or home with appropriate resources  Description: INTERVENTIONS:  - Conduct assessment to determine patient/family and health care team treatment goals, and need for post-acute services based on payer coverage, community resources, and patient preferences, and barriers to discharge  - Address psychosocial, clinical, and financial barriers to discharge as identified in assessment in conjunction with the patient/family and health care team  - Arrange appropriate level of post-acute services according to patient's   needs and preference and payer coverage in collaboration with the physician and health care team  - Communicate with and  update the patient/family, physician, and health care team regarding progress on the discharge plan  - Arrange appropriate transportation to post-acute venues  Outcome: Progressing

## 2024-11-28 NOTE — PROGRESS NOTES
"Progress Note - Behavioral Health   Name: Alberto Berumen 29 y.o. male I MRN: [id number]  Unit/Bed#: [facility] 112-02 I Date of Admission: 3/29/2024   Date of Service: 11/28/2024 I Hospital Day: 244     Assessment & Plan  Schizoaffective disorder, bipolar type (HCC)  Reviewed on 11/27/24    Patient reports feelings of depression and anxiety, racing thoughts, hopelessness, low-self esteem, and low energy. He states his motivation is \"medium\". He states he slept \"well\" last night. He states his mood is \"tired and depressed\". He states his depression is a 7/10 this morning. He states he is depressed and anxious due to the voices in his head constantly telling him they are going to kill him and his family and that he is going to hell. He states his anxiety is a 6/10 this morning. He states the voices are random people and not his own voice. He states he has been hearing this voices constantly for over 2 years. He states the voices have not said anything new recently. He states \"working out\", walking and talking with peers and listening to music helps him cope with the voices and he used the punching bag yesterday which helped. He denies any visual hallucinations. He reports persecutory delusions in regards to the voices he is hearing but states he does not think anyone here on the unit is going to hurt him and he feels safe here. He denies grandiose, somatic, or bizarre delusions. He reports passive death wishes \"sometimes\" and when asked if he has them currently he said \"I don't know\". He denies thoughts of harm to self and others, SI, or HI.     Continue Clozapine 225 mg daily at bedtime for psychosis -- to consider further careful titration for Sxs depending on response  Continue to obtain CBC with diff, CRP, and CK (weekly on Tues), and a Clozapine level and BNP (every 2 weeks on Tues). ANC 4.71 11/26/24    Continue Abilify 30 mg once daily for psychosis  Continue Escitalopram 20 mg once daily for depression and " anxiety  Continue Senna-Docusate sodium one 50 mg tablet daily before bed for constipation  - decreased from two tablets daily on 11/3 due to reported loose stools  Continue propanolol 10 mg twice daily for anxiety   Continue amlodipine 5 mg PO daily     Pt remains on dual antipsychotic Tx due to failure on monotherapy      ACT team referral was made  No associated orders from this encounter found during lookback period of 72 hours.    Chronic idiopathic constipation  Reviewed on 11/27/24  Follows with medical  Continue senna-docusate sodium dose per medical team.   No associated orders from this encounter found during lookback period of 72 hours.  GERD (gastroesophageal reflux disease)  Reviewed on 11/27/24  Follows with medical  Continue famotidine 20 mg tablet BID per medical team   Continue glycopyrrolate 1 mg tablet BID AM and afternoon per medical team  Continue glycopyrrolate 2 mg tablet before bed per medical team  Continue pantoprazole EC 20 mg tablet every morning per medical team  No associated orders from this encounter found during lookback period of 72 hours.  Medical clearance for psychiatric admission  Reviewed on 11/27/24  Ongoing by medical  No associated orders from this encounter found during lookback period of 72 hours.    Tobacco abuse  Reviewed on 11/27/24  Continue to encourage cessation upon discharge  No associated orders from this encounter found during lookback period of 72 hours.  Elevated hemoglobin A1c  Reviewed on 11/27/24  Follows with medical  No associated orders from this encounter found during lookback period of 72 hours.  Class 2 obesity in adult  Reviewed on 11/27/24  Follows with medical  No associated orders from this encounter found during lookback period of 72 hours.    Primary hypertension  Reviewed on 11/27/24  Follows with medical  No associated orders from this encounter found during lookback period of 72 hours.  Progress Note - Behavioral Health     Alberto Bright  "y.o. male MRN: [id number]   Unit/Bed#: [facility] 112-02 Encounter: [id number]    Behavior over the last 24 hours: unchanged.     Alberto was seen and evaluated today. Per nursing, patient t is calm, cooperative and visible on milieu. Pt reports AH, depression and anxiety; denies SI/HI/VH. Pt states he is looking forward to his family visiting today. Pt interacts appropriately with peers and reports sleeping well. Patient visible and cooperative all evening. Medication compliant as well.     Today patient states his mood is \"low\". He is found resting in bed. He states that he was admitted to the hospital for depression and anxiety. He continues to endorse depression 8/10 with 10 being the worst and anxiety 5/10 with 10 being the worst. He feels that overall his medication regimen is helpful. He states that he makes attempts to attend groups, will get out of bed today.  In regard to medication tolerance, reports daytime sedation. Patient denies SI/HI/VH. He endorses AH, but denies CAH. In regard to sleep and appetite, denies disturbances.  No acute events over the past 24H.         ROS: no complaints    Mental Status Evaluation:    Appearance: age appropriate, casually dressed, dressed appropriately, laying in bed  Behavior: cooperative, calm, intermittent eye contact  Speech: normal rate, normal pitch, soft volume  Mood: \"low\"  Affect: constricted, mood-congruent  Thought Process: organized, logical, coherent, goal directed, linear  Thought Content: Mild paranoia regarding threatening voices he hears, negative thinking  Perceptual Disturbances: Auditory hallucinations of voices saying they are going to kill him and his family. Denies visual hallucinations when asked. Does not appear to be responding to internal stimuli. Denies CAH.   Sensorium: alert and oriented to person, place, time and situation  Cognition: recent and remote memory grossly intact  Consciousness: alert and awake  Attention: attention span and " concentration are age appropriate  Intellect: appears to be of average intelligence  Insight: fair  Judgement: fair  Motor Activity: no abnormal movements  Vital signs in last 24 hours:    Temp:  [97.8 °F (36.6 °C)] 97.8 °F (36.6 °C)  HR:  [] 99  BP: (118-139)/(67-86) 132/67  Resp:  [18] 18  SpO2:  [97 %-98 %] 97 %  O2 Device: None (Room air)    Laboratory results: I have personally reviewed all pertinent laboratory/tests results    Results from the past 24 hours: No results found for this or any previous visit (from the past 24 hours).    Progress Toward Goals: progressing    Assessment & Plan   Principal Problem:    Schizoaffective disorder, bipolar type (HCC)  Active Problems:    GERD (gastroesophageal reflux disease)    Medical clearance for psychiatric admission    Tobacco abuse    T wave inversion in EKG    Chronic idiopathic constipation    Confluent and reticulate papillomatosis    Class 2 obesity in adult    Primary hypertension    Elevated hemoglobin A1c    Bilateral lower extremity edema      Recommended Treatment:     Planned medication and treatment changes:    All current active medications have been reviewed  Encourage group therapy, milieu therapy and occupational therapy  Behavioral Health checks every 7 minutes  Continue current medications:    Current Facility-Administered Medications   Medication Dose Route Frequency Provider Last Rate    acetaminophen  650 mg Oral Q4H PRN Jordan C Holter, DO      acetaminophen  650 mg Oral Q6H PRN HOLLI Lion      aluminum-magnesium hydroxide-simethicone  30 mL Oral Q4H PRN Jordan C Holter, DO      amLODIPine  5 mg Oral Daily HOLLI Lion      ARIPiprazole  30 mg Oral Daily Bora Rosario MD      Artificial Tears  1 drop Both Eyes Q3H PRN Jordan C Holter, DO      atropine  1 drop Sublingual  Harjeet Torres DO      haloperidol lactate  2.5 mg Intramuscular Q4H PRN Max 4/day HOLLI Lion      And    LORazepam  1 mg Intramuscular Q4H PRN  Max 4/day HOLLI Lion      And    benztropine  0.5 mg Intramuscular Q4H PRN Max 4/day HOLLI Lion      benztropine  1 mg Intramuscular Q4H PRN Max 6/day Jordan C Holter, DO      haloperidol lactate  5 mg Intramuscular Q4H PRN Max 4/day HOLLI Lion      And    LORazepam  2 mg Intramuscular Q4H PRN Max 4/day HOLLI Lion      And    benztropine  1 mg Intramuscular Q4H PRN Max 4/day HOLLI Lion      benztropine  1 mg Oral Q4H PRN Max 6/day HOLLI Lion      benztropine  1 mg Oral Q4H PRN Max 6/day Jordan C Holter, DO      bisacodyl  10 mg Rectal Daily PRN HOLLI Lion      calcium carbonate  500 mg Oral BID PRN HOLLI Monge      cloZAPine  225 mg Oral HS Hardik So MD      hydrOXYzine HCL  50 mg Oral Q6H PRN Max 4/day Jordan C Holter, DO      Or    diphenhydrAMINE  50 mg Intramuscular Q6H PRN Jordan C Holter, DO      hydrOXYzine HCL  50 mg Oral Q6H PRN Max 4/day HOLLI Lion      Or    diphenhydrAMINE  50 mg Intramuscular Q6H PRN HOLLI Lion      diphenhydrAMINE-zinc acetate   Topical BID PRN HOLLI Lion      docusate sodium  100 mg Oral BID Jourdan Dickens, DO      escitalopram  20 mg Oral Daily HOLLI Lion      famotidine  20 mg Oral BID PRN HOLLI Monge      fluticasone  1 spray Each Nare Daily PRN HOLLI Monge      glycopyrrolate  1 mg Oral BID (AM & Afternoon) Bora Rosario MD      glycopyrrolate  2 mg Oral HS Bora Rosario MD      haloperidol  1 mg Oral Q6H PRN HOLLI Lion      haloperidol  2.5 mg Oral Q4H PRN Max 4/day HOLLI Lion      haloperidol  5 mg Oral Q4H PRN Max 4/day HOLLI Lion      hydroCHLOROthiazide  12.5 mg Oral Daily Pia Mantilla, CRNP      hydrocortisone   Topical 4x Daily PRN HOLLI Lion      hydrOXYzine HCL  100 mg Oral Q6H PRN Max 4/day Jordan C Holter, DO      Or    LORazepam  2 mg Intramuscular Q6H PRN Jordan C Holter,  DO      hydrOXYzine HCL  100 mg Oral Q6H PRN Max 4/day HOLLI Lion      Or    LORazepam  2 mg Intramuscular Q6H PRN HOLLI Lion      hydrOXYzine HCL  25 mg Oral Q6H PRN Max 4/day [facility] Holter, DO      ibuprofen  600 mg Oral Q8H PRN HOLLI Lion      influenza vaccine  0.5 mL Intramuscular Once Bora Rosario MD      loratadine  10 mg Oral Daily Brina Guillen MD      magnesium hydroxide  30 mL Oral Once Jourdan Dickens, DO      melatonin  3 mg Oral HS PRN Bora Rosario MD      melatonin  9 mg Oral HS Bora Rosario MD      methocarbamol  500 mg Oral Q6H PRN HOLLI Lion      multivitamin-minerals  1 tablet Oral Daily Eileen MiyaHOLLI Jimenez      nicotine polacrilex  2 mg Oral Q4H PRN Bora Rosario MD      OLANZapine  5 mg Oral Q4H PRN Max 3/day [facility] Holter, DO      Or    OLANZapine  2.5 mg Intramuscular Q4H PRN Max 3/day [facility] Holter, DO      OLANZapine  5 mg Oral Q3H PRN Max 3/day [facility] Holter, DO      Or    OLANZapine  5 mg Intramuscular Q3H PRN Max 3/day [facility] Holter, DO      OLANZapine  2.5 mg Oral Q4H PRN Max 6/day [facility] Holter, DO      ondansetron  4 mg Oral Q6H PRN HOLLI Lion      pantoprazole  [facility]      polyethylene glycol  17 g Oral Daily PRN HOLLI Ghotra      polyethylene glycol  17 g Oral Daily Jourdan Dickens, DO      propranolol  10 mg Oral Q12H KAYLIE HOLLI Lion      senna-docusate sodium  1 tablet Oral Daily PRN [facility] Holter, DO      senna-docusate sodium  2 tablet Oral HS Jourdan Dickens, DO      traZODone  50 mg Oral HS PRN HOLLI Lion      white petrolatum-mineral oil   Topical TID PRN HOLLI Lion       Risks / Benefits of Treatment:    Risks, benefits, and possible side effects of medications explained to patient and patient verbalizes understanding and agreement for treatment.    Counseling / Coordination of Care:    Patient's progress discussed with staff in treatment team  meeting.  Medications, treatment progress and treatment plan reviewed with patient.    Mary Jo Reich PA-C 11/28/24

## 2024-11-28 NOTE — NURSING NOTE
Alert, cooperative and visible intermittently. No SI or HI noted. Socializing with selective peers. Pt has c/o of a little depression, and anxiety. Denies pain. Pt continues to have auditory hallucinations. Attended exercise, and nursing education. Refused breakfast and consumed 100% of lunch. Took all medication without prompting. Maintained on safe precautions without incident. Will continue to monitor progress and recovery program.

## 2024-11-28 NOTE — ASSESSMENT & PLAN NOTE
"Reviewed on 11/27/24    Patient reports feelings of depression and anxiety, racing thoughts, hopelessness, low-self esteem, and low energy. He states his motivation is \"medium\". He states he slept \"well\" last night. He states his mood is \"tired and depressed\". He states his depression is a 7/10 this morning. He states he is depressed and anxious due to the voices in his head constantly telling him they are going to kill him and his family and that he is going to hell. He states his anxiety is a 6/10 this morning. He states the voices are random people and not his own voice. He states he has been hearing this voices constantly for over 2 years. He states the voices have not said anything new recently. He states \"working out\", walking and talking with peers and listening to music helps him cope with the voices and he used the punching bag yesterday which helped. He denies any visual hallucinations. He reports persecutory delusions in regards to the voices he is hearing but states he does not think anyone here on the unit is going to hurt him and he feels safe here. He denies grandiose, somatic, or bizarre delusions. He reports passive death wishes \"sometimes\" and when asked if he has them currently he said \"I don't know\". He denies thoughts of harm to self and others, SI, or HI.     Continue Clozapine 225 mg daily at bedtime for psychosis -- to consider further careful titration for Sxs depending on response  Continue to obtain CBC with diff, CRP, and CK (weekly on Tues), and a Clozapine level and BNP (every 2 weeks on Tues). ANC 4.71 11/26/24    Continue Abilify 30 mg once daily for psychosis  Continue Escitalopram 20 mg once daily for depression and anxiety  Continue Senna-Docusate sodium one 50 mg tablet daily before bed for constipation  - decreased from two tablets daily on 11/3 due to reported loose stools  Continue propanolol 10 mg twice daily for anxiety   Continue amlodipine 5 mg PO daily     Pt remains on " dual antipsychotic Tx due to failure on monotherapy      ACT team referral was made  No associated orders from this encounter found during lookback period of 72 hours.

## 2024-11-29 PROCEDURE — 99232 SBSQ HOSP IP/OBS MODERATE 35: CPT | Performed by: PSYCHIATRY & NEUROLOGY

## 2024-11-29 RX ADMIN — PROPRANOLOL HYDROCHLORIDE 10 MG: 10 TABLET ORAL at 08:58

## 2024-11-29 RX ADMIN — LORATADINE 10 MG: 10 TABLET ORAL at 08:58

## 2024-11-29 RX ADMIN — CLOZAPINE 225 MG: 25 TABLET ORAL at 21:06

## 2024-11-29 RX ADMIN — GLYCOPYRROLATE 2 MG: 1 TABLET ORAL at 21:06

## 2024-11-29 RX ADMIN — NICOTINE POLACRILEX 2 MG: 2 GUM, CHEWING ORAL at 08:57

## 2024-11-29 RX ADMIN — ESCITALOPRAM OXALATE 20 MG: 10 TABLET, FILM COATED ORAL at 08:59

## 2024-11-29 RX ADMIN — DOCUSATE SODIUM 100 MG: 100 CAPSULE, LIQUID FILLED ORAL at 17:09

## 2024-11-29 RX ADMIN — HYDROCHLOROTHIAZIDE 12.5 MG: 12.5 TABLET ORAL at 09:00

## 2024-11-29 RX ADMIN — MELATONIN TAB 3 MG 9 MG: 3 TAB at 21:06

## 2024-11-29 RX ADMIN — GLYCOPYRROLATE 1 MG: 1 TABLET ORAL at 13:00

## 2024-11-29 RX ADMIN — GLYCOPYRROLATE 1 MG: 1 TABLET ORAL at 08:59

## 2024-11-29 RX ADMIN — ATROPINE SULFATE 1 DROP: 10 SOLUTION/ DROPS OPHTHALMIC at 21:06

## 2024-11-29 RX ADMIN — NICOTINE POLACRILEX 2 MG: 2 GUM, CHEWING ORAL at 17:09

## 2024-11-29 RX ADMIN — PROPRANOLOL HYDROCHLORIDE 10 MG: 10 TABLET ORAL at 21:06

## 2024-11-29 RX ADMIN — NICOTINE POLACRILEX 2 MG: 2 GUM, CHEWING ORAL at 21:08

## 2024-11-29 RX ADMIN — MULTIPLE VITAMINS W/ MINERALS TAB 1 TABLET: TAB ORAL at 08:58

## 2024-11-29 RX ADMIN — DOCUSATE SODIUM 100 MG: 100 CAPSULE, LIQUID FILLED ORAL at 08:57

## 2024-11-29 RX ADMIN — AMLODIPINE BESYLATE 5 MG: 5 TABLET ORAL at 08:57

## 2024-11-29 RX ADMIN — SENNOSIDES AND DOCUSATE SODIUM 2 TABLET: 50; 8.6 TABLET ORAL at 21:06

## 2024-11-29 RX ADMIN — ARIPIPRAZOLE 30 MG: 15 TABLET ORAL at 08:58

## 2024-11-29 RX ADMIN — NICOTINE POLACRILEX 2 MG: 2 GUM, CHEWING ORAL at 12:58

## 2024-11-29 RX ADMIN — PANTOPRAZOLE SODIUM 20 MG: 20 TABLET, DELAYED RELEASE ORAL at 08:58

## 2024-11-29 NOTE — ASSESSMENT & PLAN NOTE
Follows with medical  No associated orders from this encounter found during lookback period of 72 hours.

## 2024-11-29 NOTE — PROGRESS NOTES
11/29/24 0733   Team Meeting   Meeting Type Daily Rounds   Team Members Present   Team Members Present Physician;Nurse;;Other (Discipline and Name)   Physician Team Member Holter, Noonan CRNP   Nursing Team Member Claudia   Social Work Team Member Jose J ORTEGA   Patient/Family Present   Patient Present No   Patient's Family Present No     Groups Participation  9/11.   Patient's compliant with medications. He's engaged in his treatment. He's appropriate. Had family visit on Wednesday.

## 2024-11-29 NOTE — ASSESSMENT & PLAN NOTE
Ongoing by medical  No associated orders from this encounter found during lookback period of 72 hours.

## 2024-11-29 NOTE — PLAN OF CARE
Problem: Alteration in Thoughts and Perception  Goal: Treatment Goal: Gain control of psychotic behaviors/thinking, reduce/eliminate presenting symptoms and demonstrate improved reality functioning upon discharge  Outcome: Progressing  Goal: Refrain from acting on delusional thinking/internal stimuli  Description: Interventions:  - Monitor patient closely, per order   - Utilize least restrictive measures   - Set reasonable limits, give positive feedback for acceptable   - Administer medications as ordered and monitor of potential side effects  Outcome: Progressing  Goal: Agree to be compliant with medication regime, as prescribed and report medication side effects  Description: Interventions:  - Offer appropriate PRN medication and supervise ingestion; conduct AIMS, as needed   Outcome: Progressing  Goal: Attend and participate in unit activities, including therapeutic, recreational, and educational groups  Description: Interventions:  -Encourage Visitation and family involvement in care  Outcome: Progressing  Goal: Recognize dysfunctional thoughts, communicate reality-based thoughts at the time of discharge  Description: Interventions:  - Provide medication and psycho-education to assist patient in compliance and developing insight into his/her illness   Outcome: Progressing  Goal: Complete daily ADLs, including personal hygiene independently, as able  Description: Interventions:  - Observe, teach, and assist patient with ADLS  - Monitor and promote a balance of rest/activity, with adequate nutrition and elimination   Outcome: Progressing     Problem: Ineffective Coping  Goal: Demonstrates healthy coping skills  Outcome: Progressing  Goal: Participates in unit activities  Description: Interventions:  - Provide therapeutic environment   - Provide required programming   - Redirect inappropriate behaviors   Outcome: Progressing  Goal: Patient/Family participate in treatment and DC plans  Description:  Interventions:  - Provide therapeutic environment  Outcome: Progressing  Goal: Patient/Family verbalizes awareness of resources  Outcome: Progressing     Problem: Depression  Goal: Verbalize thoughts and feelings  Description: Interventions:  - Assess and re-assess patient's level of risk   - Engage patient in 1:1 interactions, daily, for a minimum of 15 minutes   - Encourage patient to express feelings, fears, frustrations, hopes   Outcome: Progressing  Goal: Refrain from harming self  Description: Interventions:  - Monitor patient closely, per order   - Supervise medication ingestion, monitor effects and side effects   Outcome: Progressing  Goal: Refrain from isolation  Description: Interventions:  - Develop a trusting relationship   - Encourage socialization   Outcome: Progressing  Goal: Refrain from self-neglect  Outcome: Progressing  Goal: Attend and participate in unit activities, including therapeutic, recreational, and educational groups  Description: Interventions:  - Provide therapeutic and educational activities daily, encourage attendance and participation, and document same in the medical record   Outcome: Progressing     Problem: Anxiety  Goal: Anxiety is at manageable level  Description: Interventions:  - Assess and monitor patient's anxiety level.   - Monitor for signs and symptoms (heart palpitations, chest pain, shortness of breath, headaches, nausea, feeling jumpy, restlessness, irritable, apprehensive).   - Collaborate with interdisciplinary team and initiate plan and interventions as ordered.  - [place] patient to unit/surroundings  - Explain treatment plan  - Encourage participation in care  - Encourage verbalization of concerns/fears  - Identify coping mechanisms  - Assist in developing anxiety-reducing skills  - Administer/offer alternative therapies  - Limit or eliminate stimulants  Outcome: Progressing     Problem: Alteration in Orientation  Goal: Treatment Goal: Demonstrate a reduction of  confusion and improved orientation to person, place, time and/or situation upon discharge, according to optimum baseline/ability  Outcome: Progressing  Goal: Interact with staff daily  Description: Interventions:  - Assess and re-assess patient's level of orientation  - Engage patient in 1 on 1 interactions, daily, for a minimum of 15 minutes   - Establish rapport/trust with patient   Outcome: Progressing  Goal: Cooperate with recommended testing/procedures  Description: Interventions:  - Determine need for ancillary testing  - Observe for mental status changes  - Implement falls/precaution protocol   Outcome: Progressing  Goal: Complete daily ADLs, including personal hygiene independently, as able  Description: Interventions:  - Observe, teach, and assist patient with ADLS  Outcome: Progressing     Problem: DISCHARGE PLANNING - CARE MANAGEMENT  Goal: Discharge to post-acute care or home with appropriate resources  Description: INTERVENTIONS:  - Conduct assessment to determine patient/family and health care team treatment goals, and need for post-acute services based on payer coverage, community resources, and patient preferences, and barriers to discharge  - Address psychosocial, clinical, and financial barriers to discharge as identified in assessment in conjunction with the patient/family and health care team  - Arrange appropriate level of post-acute services according to patient's   needs and preference and payer coverage in collaboration with the physician and health care team  - Communicate with and update the patient/family, physician, and health care team regarding progress on the discharge plan  - Arrange appropriate transportation to post-acute venues  Outcome: Progressing

## 2024-11-29 NOTE — ASSESSMENT & PLAN NOTE
Follows with medical  Continue famotidine 20 mg tablet BID per medical team   Continue glycopyrrolate 1 mg tablet BID AM and afternoon per medical team  Continue glycopyrrolate 2 mg tablet before bed per medical team  Continue pantoprazole EC 20 mg tablet every morning per medical team  No associated orders from this encounter found during lookback period of 72 hours.

## 2024-11-29 NOTE — ASSESSMENT & PLAN NOTE
Continue to encourage cessation upon discharge  No associated orders from this encounter found during lookback period of 72 hours.

## 2024-11-29 NOTE — ASSESSMENT & PLAN NOTE
Follows with medical  Continue senna-docusate sodium dose per medical team.   No associated orders from this encounter found during lookback period of 72 hours.

## 2024-11-29 NOTE — ASSESSMENT & PLAN NOTE
"    Patient reports feelings of depression and anxiety, racing thoughts, hopelessness, low-self esteem, and low energy. He states his motivation is \"medium\". He states he slept \"well\" last night. He states his mood is \"tired and depressed\". He states his depression is a 7/10 this morning. He states he is depressed and anxious due to the voices in his head constantly telling him they are going to kill him and his family and that he is going to hell. He states his anxiety is a 6/10 this morning. He states the voices are random people and not his own voice. He states he has been hearing this voices constantly for over 2 years. He states the voices have not said anything new recently. He states \"working out\", walking and talking with peers and listening to music helps him cope with the voices and he used the punching bag yesterday which helped. He denies any visual hallucinations. He reports persecutory delusions in regards to the voices he is hearing but states he does not think anyone here on the unit is going to hurt him and he feels safe here. He denies grandiose, somatic, or bizarre delusions. He reports passive death wishes \"sometimes\" and when asked if he has them currently he said \"I don't know\". He denies thoughts of harm to self and others, SI, or HI.     Continue Clozapine 225 mg daily at bedtime for psychosis -- to consider further careful titration for Sxs depending on response  Continue to obtain CBC with diff, CRP, and CK (weekly on Tues), and a Clozapine level and BNP (every 2 weeks on Tues). ANC 4.71 11/26/24    Continue Abilify 30 mg once daily for psychosis  Continue Escitalopram 20 mg once daily for depression and anxiety  Continue Senna-Docusate sodium one 50 mg tablet daily before bed for constipation  - decreased from two tablets daily on 11/3 due to reported loose stools  Continue propanolol 10 mg twice daily for anxiety   Continue amlodipine 5 mg PO daily     Pt remains on dual antipsychotic Tx " due to failure on monotherapy      ACT team referral was made  No associated orders from this encounter found during lookback period of 72 hours.

## 2024-11-29 NOTE — PROGRESS NOTES
"    Psychiatric Progress Note - Department of Behavioral Health   Alberto Berumen 29 y.o. male MRN: [id number]  Unit/Bed#: [facility] 112-02 Encounter: [id number]    ASSESSMENT & PLAN     Assessment & Plan  Schizoaffective disorder, bipolar type (HCC)      Patient reports feelings of depression and anxiety, racing thoughts, hopelessness, low-self esteem, and low energy. He states his motivation is \"medium\". He states he slept \"well\" last night. He states his mood is \"tired and depressed\". He states his depression is a 7/10 this morning. He states he is depressed and anxious due to the voices in his head constantly telling him they are going to kill him and his family and that he is going to hell. He states his anxiety is a 6/10 this morning. He states the voices are random people and not his own voice. He states he has been hearing this voices constantly for over 2 years. He states the voices have not said anything new recently. He states \"working out\", walking and talking with peers and listening to music helps him cope with the voices and he used the punching bag yesterday which helped. He denies any visual hallucinations. He reports persecutory delusions in regards to the voices he is hearing but states he does not think anyone here on the unit is going to hurt him and he feels safe here. He denies grandiose, somatic, or bizarre delusions. He reports passive death wishes \"sometimes\" and when asked if he has them currently he said \"I don't know\". He denies thoughts of harm to self and others, SI, or HI.     Continue Clozapine 225 mg daily at bedtime for psychosis -- to consider further careful titration for Sxs depending on response  Continue to obtain CBC with diff, CRP, and CK (weekly on Tues), and a Clozapine level and BNP (every 2 weeks on Tues). ANC 4.71 11/26/24    Continue Abilify 30 mg once daily for psychosis  Continue Escitalopram 20 mg once daily for depression and anxiety  Continue Senna-Docusate sodium one " 50 mg tablet daily before bed for constipation  - decreased from two tablets daily on 11/3 due to reported loose stools  Continue propanolol 10 mg twice daily for anxiety   Continue amlodipine 5 mg PO daily     Pt remains on dual antipsychotic Tx due to failure on monotherapy      ACT team referral was made  No associated orders from this encounter found during lookback period of 72 hours.    Chronic idiopathic constipation    Follows with medical  Continue senna-docusate sodium dose per medical team.   No associated orders from this encounter found during lookback period of 72 hours.  GERD (gastroesophageal reflux disease)    Follows with medical  Continue famotidine 20 mg tablet BID per medical team   Continue glycopyrrolate 1 mg tablet BID AM and afternoon per medical team  Continue glycopyrrolate 2 mg tablet before bed per medical team  Continue pantoprazole EC 20 mg tablet every morning per medical team  No associated orders from this encounter found during lookback period of 72 hours.  Medical clearance for psychiatric admission    Ongoing by medical  No associated orders from this encounter found during lookback period of 72 hours.    Tobacco abuse    Continue to encourage cessation upon discharge  No associated orders from this encounter found during lookback period of 72 hours.  Elevated hemoglobin A1c    Follows with medical  No associated orders from this encounter found during lookback period of 72 hours.  Class 2 obesity in adult    Follows with medical  No associated orders from this encounter found during lookback period of 72 hours.    Primary hypertension    Follows with medical  No associated orders from this encounter found during lookback period of 72 hours.    Treatment Recommendations/Precautions:  Continue to promote patient participation in therapeutic milieu.  Continue medical management per medicine.  Continue previously prescribed psychotropic medication regimen; see below.  Continue  behavioral health checks q.15 minutes.   Continue vitals per behavioral health unit protocol.  Discharge date per primary team; 201 commitment status.    SUBJECTIVE     Patient evaluated this a.m. for continuity of care. Patient was discussed at length with nursing and treatment team. Per nursing, patient remains calm, cooperative, present in the milieu, adherent to his medications less any acute adverse effects. No acute distress is noted throughout evaluation. Alberto Berumen denies suicidal/homicidal ideation in addition to thoughts of self-injury, receptive to crisis planning provided by this writer, contacting for safety in the inpatient setting, admitting to an ability to appropriately confide in staff including this writer. Patient admits to depression accompanied by intermittent instances of auditory hallucinations that are negative and derogatory in content, somewhat suspicious, paranoid, denying any additional psychiatric complaints/concerns.     PSYCHIATRIC REVIEW OF SYSTEMS     Behavior over the last 24 hours:  unchanged  Sleep: adequate  Appetite: adequate  Medication side effects: none    REVIEW OF SYSTEMS   Review of systems: no complaints    OBJECTIVE     Vital Signs in Past 24 Hours:  Temp:  [97.3 °F (36.3 °C)-97.8 °F (36.6 °C)] 97.3 °F (36.3 °C)  HR:  [] 80  BP: (132-143)/(67-85) 135/83  Resp:  [18] 18  SpO2:  [97 %-100 %] 100 %  O2 Device: None (Room air)    Intake/Output in Past 24 hours:  No intake/output data recorded.  No intake/output data recorded.        Laboratory Results:  I have personally reviewed all pertinent laboratory/tests results.  Most Recent Labs:   Lab Results   Component Value Date    WBC 8.88 11/26/2024    RBC 5.54 11/26/2024    HGB 13.0 11/26/2024    HCT 43.4 11/26/2024     11/26/2024    RDW 14.4 11/26/2024    NEUTROABS 4.71 11/26/2024    SODIUM 140 10/16/2024    K 3.8 10/16/2024     10/16/2024    CO2 29 10/16/2024    BUN 9 10/16/2024    CREATININE 0.91  10/16/2024    GLUC 94 10/16/2024    GLUF 94 10/16/2024    CALCIUM 9.5 10/16/2024    AST 26 09/30/2024    ALT 42 09/30/2024    ALKPHOS 64 09/30/2024    TP 6.6 09/30/2024    ALB 3.8 09/30/2024    TBILI 0.47 09/30/2024    CHOLESTEROL 156 03/14/2024    HDL 44 03/14/2024    TRIG 133 03/14/2024    LDLCALC 85 03/14/2024    NONHDLC 112 03/14/2024    LITHIUM 0.61 01/09/2024    [id number] 1.062 11/15/2023    HGBA1C 5.0 04/01/2024    EAG 97 04/01/2024       Behavioral Health Medications: all current active meds have been reviewed and current meds:   Current Facility-Administered Medications:     acetaminophen (TYLENOL) tablet 650 mg, Q4H PRN    acetaminophen (TYLENOL) tablet 650 mg, Q6H PRN    aluminum-magnesium hydroxide-simethicone (MAALOX) oral suspension 30 mL, Q4H PRN    amLODIPine (NORVASC) tablet 5 mg, Daily    ARIPiprazole (ABILIFY) tablet 30 mg, Daily    Artificial Tears ophthalmic solution 1 drop, Q3H PRN    atropine (ISOPTO ATROPINE) 1 % ophthalmic solution 1 drop, HS    haloperidol lactate (HALDOL) injection 2.5 mg, Q4H PRN Max 4/day **AND** LORazepam (ATIVAN) injection 1 mg, Q4H PRN Max 4/day **AND** benztropine (COGENTIN) injection 0.5 mg, Q4H PRN Max 4/day    benztropine (COGENTIN) injection 1 mg, Q4H PRN Max 6/day    haloperidol lactate (HALDOL) injection 5 mg, Q4H PRN Max 4/day **AND** LORazepam (ATIVAN) injection 2 mg, Q4H PRN Max 4/day **AND** benztropine (COGENTIN) injection 1 mg, Q4H PRN Max 4/day    benztropine (COGENTIN) tablet 1 mg, Q4H PRN Max 6/day    benztropine (COGENTIN) tablet 1 mg, Q4H PRN Max 6/day    bisacodyl (DULCOLAX) rectal suppository 10 mg, Daily PRN    calcium carbonate (TUMS) chewable tablet 500 mg, BID PRN    cloZAPine (CLOZARIL) tablet 225 mg, HS    hydrOXYzine HCL (ATARAX) tablet 50 mg, Q6H PRN Max 4/day **OR** diphenhydrAMINE (BENADRYL) injection 50 mg, Q6H PRN    hydrOXYzine HCL (ATARAX) tablet 50 mg, Q6H PRN Max 4/day **OR** diphenhydrAMINE (BENADRYL) injection 50 mg, Q6H PRN     diphenhydrAMINE-zinc acetate (BENADRYL) 2-0.1 % cream, BID PRN    docusate sodium (COLACE) capsule 100 mg, BID    escitalopram (LEXAPRO) tablet 20 mg, Daily    famotidine (PEPCID) tablet 20 mg, BID PRN    fluticasone (FLONASE) 50 mcg/act nasal spray 1 spray, Daily PRN    glycopyrrolate (ROBINUL) tablet 1 mg, BID (AM & Afternoon)    glycopyrrolate (ROBINUL) tablet 2 mg, HS    haloperidol (HALDOL) tablet 1 mg, Q6H PRN    haloperidol (HALDOL) tablet 2.5 mg, Q4H PRN Max 4/day    haloperidol (HALDOL) tablet 5 mg, Q4H PRN Max 4/day    hydroCHLOROthiazide tablet 12.5 mg, Daily    hydrocortisone 1 % ointment, 4x Daily PRN    hydrOXYzine HCL (ATARAX) tablet 100 mg, Q6H PRN Max 4/day **OR** LORazepam (ATIVAN) injection 2 mg, Q6H PRN    hydrOXYzine HCL (ATARAX) tablet 100 mg, Q6H PRN Max 4/day **OR** LORazepam (ATIVAN) injection 2 mg, Q6H PRN    hydrOXYzine HCL (ATARAX) tablet 25 mg, Q6H PRN Max 4/day    ibuprofen (MOTRIN) tablet 600 mg, Q8H PRN    influenza vaccine (PF) (Fluarix) IM injection 0.5 mL, Once    loratadine (CLARITIN) tablet 10 mg, Daily    magnesium hydroxide (MILK OF MAGNESIA) oral suspension 30 mL, Once    melatonin tablet 3 mg, HS PRN    melatonin tablet 9 mg, HS    methocarbamol (ROBAXIN) tablet 500 mg, Q6H PRN    multivitamin-minerals (CENTRUM) tablet 1 tablet, Daily    nicotine polacrilex (NICORETTE) gum 2 mg, Q4H PRN    OLANZapine (ZyPREXA) tablet 5 mg, Q4H PRN Max 3/day **OR** OLANZapine (ZyPREXA) IM injection 2.5 mg, Q4H PRN Max 3/day    OLANZapine (ZyPREXA) tablet 5 mg, Q3H PRN Max 3/day **OR** OLANZapine (ZyPREXA) IM injection 5 mg, Q3H PRN Max 3/day    OLANZapine (ZyPREXA) tablet 2.5 mg, Q4H PRN Max 6/day    ondansetron (ZOFRAN-ODT) dispersible tablet 4 mg, Q6H PRN    pantoprazole (PROTONIX) EC tablet 20 mg, QAM    polyethylene glycol (MIRALAX) packet 17 g, Daily PRN    polyethylene glycol (MIRALAX) packet 17 g, Daily    propranolol (INDERAL) tablet 10 mg, Q12H KAYLIE    senna-docusate sodium (SENOKOT S)  8.6-50 mg per tablet 1 tablet, Daily PRN    senna-docusate sodium (SENOKOT S) 8.6-50 mg per tablet 2 tablet, HS    traZODone (DESYREL) tablet 50 mg, HS PRN    white petrolatum-mineral oil (EUCERIN,HYDROCERIN) cream, TID PRN.    Risks, benefits and possible side effects of Medications:   Risks, benefits, and possible side effects of medications explained to patient and patient verbalizes understanding.      Mental Status Evaluation:  Appearance:  age appropriate, casually dressed, and somewhat disheveled   Behavior:  psychomotor retardation, calm, cooperative    Speech:  normal pitch and normal volume   Mood:  depressed and dysthymic   Affect:  blunted   Language naming objects and repeating phrases   Thought Process:  goal directed   Thought Content:  Negative thinking   Perceptual Disturbances: Auditory hallucinations without commands   Risk Potential: Suicidal Ideations none, Homicidal Ideations none, and Potential for Aggression No   Sensorium:  person, place, and time/date   Cognition:  recent and remote memory grossly intact   Consciousness:  alert and awake    Attention: attention span appeared shorter than expected for age   Insight:  improving   Judgment: improving   Intellect Not assessed   Gait/Station: normal gait/station and normal balance   Motor Activity: no abnormal movements     Memory: Short and long term memory  fair     Progress Toward Goals: unchanged, as evidenced by their participation (or lack thereof) in individual, social and therapeutic milieu in addition to adherence to their medication regimen.    Recommended Treatment:   See above for assessment and plan.    Inpatient Psychiatric Certification: Based upon physical, mental and social evaluations, I certify that inpatient psychiatric services are medically necessary for this patient for a duration of greater than 2 midnights for the treatment of Schizoaffective disorder, bipolar type (HCC) including psychotropic medication management,  participation in the therapeutic milieu and referrals as indicated. Available alternative community resources do not meet the patient's mental health care needs. I further attest that an established written individualized plan of care has been implemented and is outlined in the patient's medical records.    Counseling/Coordination of Care    I have expended greater than 15 minutes in which more than 50% of this time was expended in counseling/coordination of patient care relating to diagnostic results, prognosis, potential risks and benefits of management options, instructions for appropriate management, patient and/or collateral education, importance of adherence to management and/or risk factor reductions. Patient's rights, confidentiality, exceptions to confidentiality, use of electronic medical record including appropriate staff access to medical record regarding behavioral health services and consent to treatment were reviewed.    Note Share:     This note was not shared with the patient due to reasonable likelihood of causing patient harm     This note has been constructed using a voice recognition system. There may be translation, syntax,  or grammatical errors. If you have any questions, please contact the dictating provider.    Jordan Christopher Holter, DO 11/29/24

## 2024-11-29 NOTE — PLAN OF CARE
Problem: Ineffective Coping  Goal: Identifies ineffective coping skills  Outcome: Progressing  Goal: Identifies healthy coping skills  Outcome: Progressing  Goal: Demonstrates healthy coping skills  Outcome: Progressing  Goal: Participates in unit activities  Description: Interventions:  - Provide therapeutic environment   - Provide required programming   - Redirect inappropriate behaviors   Outcome: Progressing   He continues to work towards the goals by being engaged and actively participating in the groups activities.

## 2024-11-30 PROCEDURE — 99232 SBSQ HOSP IP/OBS MODERATE 35: CPT

## 2024-11-30 RX ADMIN — NICOTINE POLACRILEX 2 MG: 2 GUM, CHEWING ORAL at 08:26

## 2024-11-30 RX ADMIN — ATROPINE SULFATE 1 DROP: 10 SOLUTION/ DROPS OPHTHALMIC at 21:29

## 2024-11-30 RX ADMIN — NICOTINE POLACRILEX 2 MG: 2 GUM, CHEWING ORAL at 12:34

## 2024-11-30 RX ADMIN — SENNOSIDES AND DOCUSATE SODIUM 2 TABLET: 50; 8.6 TABLET ORAL at 21:29

## 2024-11-30 RX ADMIN — GLYCOPYRROLATE 1 MG: 1 TABLET ORAL at 08:23

## 2024-11-30 RX ADMIN — DOCUSATE SODIUM 100 MG: 100 CAPSULE, LIQUID FILLED ORAL at 08:23

## 2024-11-30 RX ADMIN — PROPRANOLOL HYDROCHLORIDE 10 MG: 10 TABLET ORAL at 08:24

## 2024-11-30 RX ADMIN — NICOTINE POLACRILEX 2 MG: 2 GUM, CHEWING ORAL at 17:32

## 2024-11-30 RX ADMIN — MULTIPLE VITAMINS W/ MINERALS TAB 1 TABLET: TAB ORAL at 08:23

## 2024-11-30 RX ADMIN — DOCUSATE SODIUM 100 MG: 100 CAPSULE, LIQUID FILLED ORAL at 17:32

## 2024-11-30 RX ADMIN — ARIPIPRAZOLE 30 MG: 15 TABLET ORAL at 08:24

## 2024-11-30 RX ADMIN — CLOZAPINE 225 MG: 25 TABLET ORAL at 21:29

## 2024-11-30 RX ADMIN — LORATADINE 10 MG: 10 TABLET ORAL at 08:24

## 2024-11-30 RX ADMIN — GLYCOPYRROLATE 1 MG: 1 TABLET ORAL at 13:09

## 2024-11-30 RX ADMIN — HYDROCHLOROTHIAZIDE 12.5 MG: 12.5 TABLET ORAL at 08:23

## 2024-11-30 RX ADMIN — MELATONIN TAB 3 MG 9 MG: 3 TAB at 21:29

## 2024-11-30 RX ADMIN — GLYCOPYRROLATE 2 MG: 1 TABLET ORAL at 21:28

## 2024-11-30 RX ADMIN — NICOTINE POLACRILEX 2 MG: 2 GUM, CHEWING ORAL at 21:30

## 2024-11-30 RX ADMIN — ESCITALOPRAM OXALATE 20 MG: 10 TABLET, FILM COATED ORAL at 08:23

## 2024-11-30 RX ADMIN — PROPRANOLOL HYDROCHLORIDE 10 MG: 10 TABLET ORAL at 21:29

## 2024-11-30 RX ADMIN — PANTOPRAZOLE SODIUM 20 MG: 20 TABLET, DELAYED RELEASE ORAL at 08:23

## 2024-11-30 NOTE — PLAN OF CARE
Problem: Alteration in Thoughts and Perception  Goal: Treatment Goal: Gain control of psychotic behaviors/thinking, reduce/eliminate presenting symptoms and demonstrate improved reality functioning upon discharge  Outcome: Progressing  Goal: Refrain from acting on delusional thinking/internal stimuli  Description: Interventions:  - Monitor patient closely, per order   - Utilize least restrictive measures   - Set reasonable limits, give positive feedback for acceptable   - Administer medications as ordered and monitor of potential side effects  Outcome: Progressing  Goal: Agree to be compliant with medication regime, as prescribed and report medication side effects  Description: Interventions:  - Offer appropriate PRN medication and supervise ingestion; conduct AIMS, as needed   Outcome: Progressing  Goal: Attend and participate in unit activities, including therapeutic, recreational, and educational groups  Description: Interventions:  -Encourage Visitation and family involvement in care  Outcome: Progressing  Goal: Recognize dysfunctional thoughts, communicate reality-based thoughts at the time of discharge  Description: Interventions:  - Provide medication and psycho-education to assist patient in compliance and developing insight into his/her illness   Outcome: Progressing  Goal: Complete daily ADLs, including personal hygiene independently, as able  Description: Interventions:  - Observe, teach, and assist patient with ADLS  - Monitor and promote a balance of rest/activity, with adequate nutrition and elimination   Outcome: Progressing     Problem: Ineffective Coping  Goal: Identifies ineffective coping skills  Outcome: Progressing  Goal: Identifies healthy coping skills  Outcome: Progressing  Goal: Demonstrates healthy coping skills  Outcome: Progressing  Goal: Participates in unit activities  Description: Interventions:  - Provide therapeutic environment   - Provide required programming   - Redirect  inappropriate behaviors   Outcome: Progressing     Problem: Depression  Goal: Verbalize thoughts and feelings  Description: Interventions:  - Assess and re-assess patient's level of risk   - Engage patient in 1:1 interactions, daily, for a minimum of 15 minutes   - Encourage patient to express feelings, fears, frustrations, hopes   Outcome: Progressing  Goal: Refrain from isolation  Description: Interventions:  - Develop a trusting relationship   - Encourage socialization   Outcome: Progressing     Problem: Alteration in Orientation  Goal: Interact with staff daily  Description: Interventions:  - Assess and re-assess patient's level of orientation  - Engage patient in 1 on 1 interactions, daily, for a minimum of 15 minutes   - Establish rapport/trust with patient   Outcome: Progressing     Problem: DISCHARGE PLANNING - CARE MANAGEMENT  Goal: Discharge to post-acute care or home with appropriate resources  Description: INTERVENTIONS:  - Conduct assessment to determine patient/family and health care team treatment goals, and need for post-acute services based on payer coverage, community resources, and patient preferences, and barriers to discharge  - Address psychosocial, clinical, and financial barriers to discharge as identified in assessment in conjunction with the patient/family and health care team  - Arrange appropriate level of post-acute services according to patient's   needs and preference and payer coverage in collaboration with the physician and health care team  - Communicate with and update the patient/family, physician, and health care team regarding progress on the discharge plan  - Arrange appropriate transportation to post-acute venues  Outcome: Progressing     Problem: Depression  Goal: Treatment Goal: Demonstrate behavioral control of depressive symptoms, verbalize feelings of improved mood/affect, and adopt new coping skills prior to discharge  Outcome: Not Progressing     Problem: Anxiety  Goal:  Anxiety is at manageable level  Description: Interventions:  - Assess and monitor patient's anxiety level.   - Monitor for signs and symptoms (heart palpitations, chest pain, shortness of breath, headaches, nausea, feeling jumpy, restlessness, irritable, apprehensive).   - Collaborate with interdisciplinary team and initiate plan and interventions as ordered.  - [place] patient to unit/surroundings  - Explain treatment plan  - Encourage participation in care  - Encourage verbalization of concerns/fears  - Identify coping mechanisms  - Assist in developing anxiety-reducing skills  - Administer/offer alternative therapies  - Limit or eliminate stimulants  Outcome: Not Progressing

## 2024-11-30 NOTE — NURSING NOTE
Alberto is in dining room, participating in a unit activity. Pleasant & cooperative. Social with peers.

## 2024-11-30 NOTE — ASSESSMENT & PLAN NOTE
Reviewed on 11/30/2024.    Continue Clozapine 225 mg daily at bedtime for psychosis -- to consider further careful titration for Sxs depending on response  Continue to obtain CBC with diff, CRP, and CK (weekly on Tues), and a Clozapine level and BNP (every 2 weeks on Tues). ANC 4.71 11/26/24    Continue Abilify 30 mg once daily for psychosis  Continue Escitalopram 20 mg once daily for depression and anxiety  Continue Senna-Docusate sodium one 50 mg tablet daily before bed for constipation  - decreased from two tablets daily on 11/3 due to reported loose stools  Continue propanolol 10 mg twice daily for anxiety   Continue amlodipine 5 mg PO daily     Pt remains on dual antipsychotic Tx due to failure on monotherapy      ACT team referral was made  No associated orders from this encounter found during lookback period of 72 hours.

## 2024-11-30 NOTE — PROGRESS NOTES
Progress Note - Behavioral Health   Name: Alberto Berumen 29 y.o. male I MRN: [id number]   Unit/Bed#: [facility] 112-02 I Date of Admission: 3/29/2024   Date of Service: 11/30/2024 I Hospital Day: 246         Assessment & Plan  Schizoaffective disorder, bipolar type (HCC)    Reviewed on 11/30/2024.    Continue Clozapine 225 mg daily at bedtime for psychosis -- to consider further careful titration for Sxs depending on response  Continue to obtain CBC with diff, CRP, and CK (weekly on Tues), and a Clozapine level and BNP (every 2 weeks on Tues). ANC 4.71 11/26/24    Continue Abilify 30 mg once daily for psychosis  Continue Escitalopram 20 mg once daily for depression and anxiety  Continue Senna-Docusate sodium one 50 mg tablet daily before bed for constipation  - decreased from two tablets daily on 11/3 due to reported loose stools  Continue propanolol 10 mg twice daily for anxiety   Continue amlodipine 5 mg PO daily     Pt remains on dual antipsychotic Tx due to failure on monotherapy      ACT team referral was made  No associated orders from this encounter found during lookback period of 72 hours.    Chronic idiopathic constipation    Follows with medical  Continue senna-docusate sodium dose per medical team.   No associated orders from this encounter found during lookback period of 72 hours.  GERD (gastroesophageal reflux disease)    Follows with medical  Continue famotidine 20 mg tablet BID per medical team   Continue glycopyrrolate 1 mg tablet BID AM and afternoon per medical team  Continue glycopyrrolate 2 mg tablet before bed per medical team  Continue pantoprazole EC 20 mg tablet every morning per medical team  No associated orders from this encounter found during lookback period of 72 hours.  Medical clearance for psychiatric admission    Ongoing by medical  No associated orders from this encounter found during lookback period of 72 hours.    Tobacco abuse    Continue to encourage cessation upon discharge  No  associated orders from this encounter found during lookback period of 72 hours.  Elevated hemoglobin A1c    Follows with medical  No associated orders from this encounter found during lookback period of 72 hours.  Class 2 obesity in adult    Follows with medical  No associated orders from this encounter found during lookback period of 72 hours.    Primary hypertension    Follows with medical  No associated orders from this encounter found during lookback period of 72 hours.       Current medications:    Current Facility-Administered Medications   Medication Dose Route Frequency Provider Last Rate    acetaminophen  650 mg Oral Q4H PRN Jordan C Holter, DO      acetaminophen  650 mg Oral Q6H PRN HOLLI Lion      aluminum-magnesium hydroxide-simethicone  30 mL Oral Q4H PRN Jordan C Holter, DO      amLODIPine  5 mg Oral Daily HOLLI Lion      ARIPiprazole  30 mg Oral Daily Bora Rosario MD      Artificial Tears  1 drop Both Eyes Q3H PRN Jordan C Holter, DO      atropine  1 drop Sublingual HS Harjeet Torres, DO      haloperidol lactate  2.5 mg Intramuscular Q4H PRN Max 4/day HOLLI Lion      And    LORazepam  1 mg Intramuscular Q4H PRN Max 4/day HOLLI Lion      And    benztropine  0.5 mg Intramuscular Q4H PRN Max 4/day HOLLI Lion      benztropine  1 mg Intramuscular Q4H PRN Max 6/day Jordan C Holter, DO      haloperidol lactate  5 mg Intramuscular Q4H PRN Max 4/day HOLLI Lion      And    LORazepam  2 mg Intramuscular Q4H PRN Max 4/day HOLLI Lion      And    benztropine  1 mg Intramuscular Q4H PRN Max 4/day HOLLI Lion      benztropine  1 mg Oral Q4H PRN Max 6/day HOLLI Lion      benztropine  1 mg Oral Q4H PRN Max 6/day Jordan C Holter, DO      bisacodyl  10 mg Rectal Daily PRN HOLLI Lion      calcium carbonate  500 mg Oral BID PRN HOLLI Monge      cloZAPine  225 mg Oral HS Hardik So MD      hydrOXYzine HCL  50 mg  Oral Q6H PRN Max 4/day Ion FISCHER Holter, DO      Or    diphenhydrAMINE  50 mg Intramuscular Q6H PRN Ion FISCHER Holter, DO      hydrOXYzine HCL  50 mg Oral Q6H PRN Max 4/day HOLLI Lion      Or    diphenhydrAMINE  50 mg Intramuscular Q6H PRN HOLLI Lion      diphenhydrAMINE-zinc acetate   Topical BID PRN HOLLI Lion      docusate sodium  100 mg Oral BID Jourdan Dickens DO      escitalopram  20 mg Oral Daily HOLLI Lion      famotidine  20 mg Oral BID PRN HOLLI Monge      fluticasone  1 spray Each Nare Daily PRN HOLLI Monge      glycopyrrolate  1 mg Oral BID (AM & Afternoon) Bora Rosario MD      glycopyrrolate  2 mg Oral HS Bora Rosario MD      haloperidol  1 mg Oral Q6H PRN HOLLI Lion      haloperidol  2.5 mg Oral Q4H PRN Max 4/day HOLLI Lion      haloperidol  5 mg Oral Q4H PRN Max 4/day HOLLI Lion      hydroCHLOROthiazide  12.5 mg Oral Daily HOLLI Galvan      hydrocortisone   Topical 4x Daily PRN HOLLI Lion      hydrOXYzine HCL  100 mg Oral Q6H PRN Max 4/day Ion FISCHER Holter, DO      Or    LORazepam  2 mg Intramuscular Q6H PRN Ion FISCHER Holter, DO      hydrOXYzine HCL  100 mg Oral Q6H PRN Max 4/day HOLLI Lion      Or    LORazepam  2 mg Intramuscular Q6H PRN HOLLI Lion      hydrOXYzine HCL  25 mg Oral Q6H PRN Max 4/day Ion FISCHER Holter, DO      ibuprofen  600 mg Oral Q8H PRN HOLLI Lion      influenza vaccine  0.5 mL Intramuscular Once Bora Rosario MD      loratadine  10 mg Oral Daily Brina Guillen MD      magnesium hydroxide  30 mL Oral Once Jourdan Dickens, DO      melatonin  3 mg Oral HS PRN Bora Rosario MD      melatonin  9 mg Oral HS Bora Rosario MD      methocarbamol  500 mg Oral Q6H PRN HOLLI Lion      multivitamin-minerals  1 tablet Oral Daily Eileen Miya Kormandy, CRNP      nicotine polacrilex  2 mg Oral Q4H PRN Bora Rosario MD      OLANZapine  5 mg Oral Q4H PRN  "Max 3/day [facility] Holter, DO      Or    OLANZapine  2.5 mg Intramuscular Q4H PRN Max 3/day [facility] Holter, DO      OLANZapine  5 mg Oral Q3H PRN Max 3/day [facility] Holter, DO      Or    OLANZapine  5 mg Intramuscular Q3H PRN Max 3/day [facility] Holter, DO      OLANZapine  2.5 mg Oral Q4H PRN Max 6/day [facility] Holter, DO      ondansetron  4 mg Oral Q6H PRN HOLLI Lion      pantoprazole  20 mg Oral QAM [facility] Holter, DO      polyethylene glycol  17 g Oral Daily PRN Sofitayla Yoo, HOLLI      polyethylene glycol  17 g Oral Daily Jourdan Dickens, DO      propranolol  10 mg Oral Q12H KAYLIE HOLLI Lion      senna-docusate sodium  1 tablet Oral Daily PRN [facility] Cresencioter, DO      senna-docusate sodium  2 tablet Oral HS Jourdan Dickens, DO      traZODone  50 mg Oral HS PRN HOLLI Lion      white petrolatum-mineral oil   Topical TID PRN HOLLI Lion         Risks / Benefits of Treatment:    Risks, benefits, and possible side effects of medications explained to patient and patient verbalizes understanding and agreement for treatment.    Treatment Planning:     All current active medications have been reviewed  Encourage group therapy, milieu therapy and occupational therapy  Behavioral Health checks for safety monitoring  Discharge planning  Continue current medications:  Estimated Discharge Date: 12/19/2024       Subjective:    Behavior over the last 24 hours: unchanged.     As per nursing, he reports ongoing depression and anxiety.  He reports ongoing AVH of voices that want to kill him and his family although feels safe on unit.  He has been engaged in groups and is social with peers and uses good humor with staff.  He has been using exercise room.      On assessment today, pt reports no significant changes to symptoms over past 24 hours.  He reports mood is \"fine\" and he is pleasant and presents with flat affect.  He denies any passive or active SI/HI with plan or intent. He does endorse ongoing " "auditory hallucinations of people that want to hurt him and his family, although reports he has been tolerating the voices and there has been no recent change in frequency or intensity of symptoms. He reports he is eating well and sleeping well.  He participates in unit acitivities.     Sleep: normal  Appetite: normal  Medication side effects: No   ROS: no complaints    Mental Status Evaluation:    Appearance:  age appropriate, casually dressed, adequate grooming   Behavior:  pleasant, cooperative, calm   Speech:  normal rate, normal volume, normal pitch   Mood:  \"Fine\"   Affect:  flat   Thought Process:  coherent, goal directed   Associations: intact associations   Thought Content:  some paranoia   Perceptual Disturbances: auditory hallucinations voices threatening self/family, denies VH, does not appear to be responding to IS   Risk Potential: Suicidal ideation - None  Homicidal ideation - None  Potential for aggression - No   Sensorium:  oriented to person, place, and time/date   Memory:  recent and remote memory grossly intact   Consciousness:  alert and awake   Attention/Concentration: attention span and concentration are age appropriate   Insight:  fair   Judgment: fair   Gait/Station: normal gait/station   Motor Activity: no abnormal movements     Vital signs in last 24 hours:    Temp:  [97.1 °F (36.2 °C)-97.8 °F (36.6 °C)] 97.8 °F (36.6 °C)  HR:  [87-95] 87  BP: (116-144)/(64-80) 116/64  Resp:  [18] 18  SpO2:  [98 %] 98 %  O2 Device: None (Room air)         Laboratory results: I have personally reviewed all pertinent laboratory/tests results    Results from the past 24 hours: No results found for this or any previous visit (from the past 24 hours).    Suicide/Homicide Risk Assessment:    Risk of Harm to Self:   Nursing Suicide Risk Assessment Last 24 hours: C-SSRS Risk (Since Last Contact)  Calculated C-SSRS Risk Score (Since Last Contact): No Risk Indicated    Risk of Harm to Others:  Nursing Homicide Risk " Assessment: Violence Risk to Others: Denies within past 6 months

## 2024-11-30 NOTE — NURSING NOTE
Pt is calm and cooperative, visible on the unit, social with peers and staff. Pt reports anxiety and depression, reports AH voices saying they are going to kill him. Pt denies SI/HI/VH. Amlodipine held d/t parameters. Pt is medication complaint, safety checks ongoing.

## 2024-11-30 NOTE — NURSING NOTE
Pt is visible in the milieu and social with select peers. He consumed 100 % of dinner. Took his medications without incidence. Pt is polite, pleasant, and cooperative. Pt reports the same AH and anxiety and depression. Pt has flat affect, but brightens on approach. Pt observed exercising in exercise room with male peers utilizing the punching bag. No unmet needs. Attended all evening groups. No behavioral issues.

## 2024-12-01 PROCEDURE — 99232 SBSQ HOSP IP/OBS MODERATE 35: CPT

## 2024-12-01 RX ADMIN — LORATADINE 10 MG: 10 TABLET ORAL at 08:48

## 2024-12-01 RX ADMIN — ESCITALOPRAM OXALATE 20 MG: 10 TABLET, FILM COATED ORAL at 08:49

## 2024-12-01 RX ADMIN — MELATONIN TAB 3 MG 9 MG: 3 TAB at 21:13

## 2024-12-01 RX ADMIN — GLYCOPYRROLATE 1 MG: 1 TABLET ORAL at 08:49

## 2024-12-01 RX ADMIN — HYDROCHLOROTHIAZIDE 12.5 MG: 12.5 TABLET ORAL at 08:50

## 2024-12-01 RX ADMIN — CLOZAPINE 225 MG: 25 TABLET ORAL at 21:14

## 2024-12-01 RX ADMIN — NICOTINE POLACRILEX 2 MG: 2 GUM, CHEWING ORAL at 08:52

## 2024-12-01 RX ADMIN — NICOTINE POLACRILEX 2 MG: 2 GUM, CHEWING ORAL at 21:14

## 2024-12-01 RX ADMIN — GLYCOPYRROLATE 2 MG: 1 TABLET ORAL at 21:13

## 2024-12-01 RX ADMIN — NICOTINE POLACRILEX 2 MG: 2 GUM, CHEWING ORAL at 17:12

## 2024-12-01 RX ADMIN — NICOTINE POLACRILEX 2 MG: 2 GUM, CHEWING ORAL at 12:56

## 2024-12-01 RX ADMIN — PROPRANOLOL HYDROCHLORIDE 10 MG: 10 TABLET ORAL at 21:14

## 2024-12-01 RX ADMIN — MULTIPLE VITAMINS W/ MINERALS TAB 1 TABLET: TAB ORAL at 08:49

## 2024-12-01 RX ADMIN — PROPRANOLOL HYDROCHLORIDE 10 MG: 10 TABLET ORAL at 08:50

## 2024-12-01 RX ADMIN — PANTOPRAZOLE SODIUM 20 MG: 20 TABLET, DELAYED RELEASE ORAL at 08:49

## 2024-12-01 RX ADMIN — DOCUSATE SODIUM 100 MG: 100 CAPSULE, LIQUID FILLED ORAL at 08:48

## 2024-12-01 RX ADMIN — SENNOSIDES AND DOCUSATE SODIUM 2 TABLET: 50; 8.6 TABLET ORAL at 21:14

## 2024-12-01 RX ADMIN — DOCUSATE SODIUM 100 MG: 100 CAPSULE, LIQUID FILLED ORAL at 17:12

## 2024-12-01 RX ADMIN — ATROPINE SULFATE 1 DROP: 10 SOLUTION/ DROPS OPHTHALMIC at 21:14

## 2024-12-01 RX ADMIN — GLYCOPYRROLATE 1 MG: 1 TABLET ORAL at 17:16

## 2024-12-01 RX ADMIN — ARIPIPRAZOLE 30 MG: 15 TABLET ORAL at 08:48

## 2024-12-01 NOTE — PLAN OF CARE
Problem: Alteration in Thoughts and Perception  Goal: Treatment Goal: Gain control of psychotic behaviors/thinking, reduce/eliminate presenting symptoms and demonstrate improved reality functioning upon discharge  Outcome: Progressing  Goal: Refrain from acting on delusional thinking/internal stimuli  Description: Interventions:  - Monitor patient closely, per order   - Utilize least restrictive measures   - Set reasonable limits, give positive feedback for acceptable   - Administer medications as ordered and monitor of potential side effects  Outcome: Progressing  Goal: Agree to be compliant with medication regime, as prescribed and report medication side effects  Description: Interventions:  - Offer appropriate PRN medication and supervise ingestion; conduct AIMS, as needed   Outcome: Progressing  Goal: Attend and participate in unit activities, including therapeutic, recreational, and educational groups  Description: Interventions:  -Encourage Visitation and family involvement in care  Outcome: Progressing  Goal: Recognize dysfunctional thoughts, communicate reality-based thoughts at the time of discharge  Description: Interventions:  - Provide medication and psycho-education to assist patient in compliance and developing insight into his/her illness   Outcome: Progressing     Problem: Ineffective Coping  Goal: Identifies ineffective coping skills  Outcome: Progressing  Goal: Identifies healthy coping skills  Outcome: Progressing  Goal: Demonstrates healthy coping skills  Outcome: Progressing     Problem: Depression  Goal: Treatment Goal: Demonstrate behavioral control of depressive symptoms, verbalize feelings of improved mood/affect, and adopt new coping skills prior to discharge  Outcome: Progressing  Goal: Verbalize thoughts and feelings  Description: Interventions:  - Assess and re-assess patient's level of risk   - Engage patient in 1:1 interactions, daily, for a minimum of 15 minutes   - Encourage  patient to express feelings, fears, frustrations, hopes   Outcome: Progressing  Goal: Refrain from harming self  Description: Interventions:  - Monitor patient closely, per order   - Supervise medication ingestion, monitor effects and side effects   Outcome: Progressing  Goal: Refrain from isolation  Description: Interventions:  - Develop a trusting relationship   - Encourage socialization   Outcome: Progressing     Problem: Anxiety  Goal: Anxiety is at manageable level  Description: Interventions:  - Assess and monitor patient's anxiety level.   - Monitor for signs and symptoms (heart palpitations, chest pain, shortness of breath, headaches, nausea, feeling jumpy, restlessness, irritable, apprehensive).   - Collaborate with interdisciplinary team and initiate plan and interventions as ordered.  - [place] patient to unit/surroundings  - Explain treatment plan  - Encourage participation in care  - Encourage verbalization of concerns/fears  - Identify coping mechanisms  - Assist in developing anxiety-reducing skills  - Administer/offer alternative therapies  - Limit or eliminate stimulants  Outcome: Progressing     Problem: Alteration in Orientation  Goal: Interact with staff daily  Description: Interventions:  - Assess and re-assess patient's level of orientation  - Engage patient in 1 on 1 interactions, daily, for a minimum of 15 minutes   - Establish rapport/trust with patient   Outcome: Progressing  Goal: Allow medical examinations, as recommended  Description: Interventions:  - Provide physical/neurological exams and/or referrals, per provider   Outcome: Progressing  Goal: Cooperate with recommended testing/procedures  Description: Interventions:  - Determine need for ancillary testing  - Observe for mental status changes  - Implement falls/precaution protocol   Outcome: Progressing     Problem: DISCHARGE PLANNING - CARE MANAGEMENT  Goal: Discharge to post-acute care or home with appropriate  resources  Description: INTERVENTIONS:  - Conduct assessment to determine patient/family and health care team treatment goals, and need for post-acute services based on payer coverage, community resources, and patient preferences, and barriers to discharge  - Address psychosocial, clinical, and financial barriers to discharge as identified in assessment in conjunction with the patient/family and health care team  - Arrange appropriate level of post-acute services according to patient's   needs and preference and payer coverage in collaboration with the physician and health care team  - Communicate with and update the patient/family, physician, and health care team regarding progress on the discharge plan  - Arrange appropriate transportation to post-acute venues  Outcome: Progressing

## 2024-12-01 NOTE — NURSING NOTE
Pt is calm and cooperative, visible on the unit, social with peers and staff.  Pt refused breakfast and ate 100% for lunch. Pt continues to report AH voices saying they are going to kill him. Pt is medication compliant, safety checks ongoing.

## 2024-12-01 NOTE — NURSING NOTE
"Visible on unit. Social with select peers. Compliant with meds and meals. Continues to hear voices \"they tell me to kill myself\"  Nicorette gum given at 1234 & 1732.&  2130. Denies SI/HI. No behavior issues.  "

## 2024-12-01 NOTE — ASSESSMENT & PLAN NOTE
Reviewed on 12/1/2024.    Continue Clozapine 225 mg daily at bedtime for psychosis -- to consider further careful titration for Sxs depending on response  Continue to obtain CBC with diff, CRP, and CK (weekly on Tues), and a Clozapine level and BNP (every 2 weeks on Tues). ANC 4.71 11/26/24    Continue Abilify 30 mg once daily for psychosis  Continue Escitalopram 20 mg once daily for depression and anxiety  Continue Senna-Docusate sodium one 50 mg tablet daily before bed for constipation  - decreased from two tablets daily on 11/3 due to reported loose stools  Continue propanolol 10 mg twice daily for anxiety   Continue amlodipine 5 mg PO daily     Pt remains on dual antipsychotic Tx due to failure on monotherapy      ACT team referral was made  No associated orders from this encounter found during lookback period of 72 hours.

## 2024-12-02 PROCEDURE — 99232 SBSQ HOSP IP/OBS MODERATE 35: CPT | Performed by: PSYCHIATRY & NEUROLOGY

## 2024-12-02 RX ADMIN — NICOTINE POLACRILEX 2 MG: 2 GUM, CHEWING ORAL at 12:43

## 2024-12-02 RX ADMIN — GLYCOPYRROLATE 1 MG: 1 TABLET ORAL at 14:02

## 2024-12-02 RX ADMIN — GLYCOPYRROLATE 1 MG: 1 TABLET ORAL at 08:05

## 2024-12-02 RX ADMIN — CLOZAPINE 225 MG: 25 TABLET ORAL at 21:10

## 2024-12-02 RX ADMIN — AMLODIPINE BESYLATE 5 MG: 5 TABLET ORAL at 08:05

## 2024-12-02 RX ADMIN — ATROPINE SULFATE 1 DROP: 10 SOLUTION/ DROPS OPHTHALMIC at 21:27

## 2024-12-02 RX ADMIN — NICOTINE POLACRILEX 2 MG: 2 GUM, CHEWING ORAL at 08:08

## 2024-12-02 RX ADMIN — GLYCOPYRROLATE 2 MG: 1 TABLET ORAL at 21:10

## 2024-12-02 RX ADMIN — DOCUSATE SODIUM 100 MG: 100 CAPSULE, LIQUID FILLED ORAL at 17:09

## 2024-12-02 RX ADMIN — NICOTINE POLACRILEX 2 MG: 2 GUM, CHEWING ORAL at 17:09

## 2024-12-02 RX ADMIN — MULTIPLE VITAMINS W/ MINERALS TAB 1 TABLET: TAB ORAL at 08:06

## 2024-12-02 RX ADMIN — LORATADINE 10 MG: 10 TABLET ORAL at 08:04

## 2024-12-02 RX ADMIN — DOCUSATE SODIUM 100 MG: 100 CAPSULE, LIQUID FILLED ORAL at 08:06

## 2024-12-02 RX ADMIN — ARIPIPRAZOLE 30 MG: 15 TABLET ORAL at 08:05

## 2024-12-02 RX ADMIN — ESCITALOPRAM OXALATE 20 MG: 10 TABLET, FILM COATED ORAL at 08:05

## 2024-12-02 RX ADMIN — MELATONIN TAB 3 MG 9 MG: 3 TAB at 21:10

## 2024-12-02 RX ADMIN — NICOTINE POLACRILEX 2 MG: 2 GUM, CHEWING ORAL at 21:10

## 2024-12-02 RX ADMIN — HYDROCHLOROTHIAZIDE 12.5 MG: 12.5 TABLET ORAL at 08:06

## 2024-12-02 RX ADMIN — SENNOSIDES AND DOCUSATE SODIUM 2 TABLET: 50; 8.6 TABLET ORAL at 21:10

## 2024-12-02 RX ADMIN — PROPRANOLOL HYDROCHLORIDE 10 MG: 10 TABLET ORAL at 08:06

## 2024-12-02 RX ADMIN — PANTOPRAZOLE SODIUM 20 MG: 20 TABLET, DELAYED RELEASE ORAL at 08:06

## 2024-12-02 RX ADMIN — PROPRANOLOL HYDROCHLORIDE 10 MG: 10 TABLET ORAL at 21:10

## 2024-12-02 NOTE — PLAN OF CARE
Problem: Ineffective Coping  Goal: Identifies ineffective coping skills  Outcome: Progressing  Goal: Identifies healthy coping skills  Outcome: Progressing  Goal: Demonstrates healthy coping skills  Outcome: Progressing  Goal: Participates in unit activities  Description: Interventions:  - Provide therapeutic environment   - Provide required programming   - Redirect inappropriate behaviors   Outcome: Progressing    He continues to work towards the goals by attending and engaging in the groups.

## 2024-12-02 NOTE — ASSESSMENT & PLAN NOTE
Reviewed 12/2/24  Continue to encourage cessation upon discharge  No associated orders from this encounter found during lookback period of 72 hours.

## 2024-12-02 NOTE — ASSESSMENT & PLAN NOTE
"  Reviewed on 12/2/2024.    Patient reports feelings of depression and anxiety, racing thoughts, hopelessness, low self-esteem, low motivation and low energy. He states he experiences mood swings \"sometimes\". He states he slept \"well\" last night. He states his mood is \"depressed\". He rates his depression as 7/10 this morning. He states he is depressed and anxious due to the voices in his head constantly telling him they are going to kill him and his family and that he is going to hell. He states his anxiety is 5/10 this morning. He states the voices are random people and not his own voice. He states he has been hearing the voices constantly for the past 2 years. He states the voices have not changed or said anything new recently. He states working out and walking/talking with peers helps him cope with the voices. He states he used the punching bag this weekend. He denies any visual hallucinations. He reports persecutory delusions regarding the threatening voices he is hearing but states he feels safe here. He denies grandiose, somatic or bizarre delusions. He reports passive death wishes \"sometimes\" but denies having them currently. He denies thoughts to harm self or others, SI, or HI.     Continue Clozapine 225 mg daily at bedtime for psychosis -- to consider further careful titration for Sxs depending on response  Continue to obtain CBC with diff, CRP, and CK (weekly on Tues), and a Clozapine level and BNP (every 2 weeks on Tues). ANC 4.71 11/26/24    Continue Abilify 30 mg once daily for psychosis  Continue Escitalopram 20 mg once daily for depression and anxiety  Continue Senna-Docusate sodium one 50 mg tablet daily before bed for constipation  - decreased from two tablets daily on 11/3 due to reported loose stools  Continue propanolol 10 mg twice daily for anxiety   Continue amlodipine 5 mg PO daily     Pt remains on dual antipsychotic Tx due to failure on monotherapy      ACT team referral was made  No " associated orders from this encounter found during lookback period of 72 hours.

## 2024-12-02 NOTE — ASSESSMENT & PLAN NOTE
Reviewed 12/2/24  Ongoing by medical  No associated orders from this encounter found during lookback period of 72 hours.

## 2024-12-02 NOTE — ASSESSMENT & PLAN NOTE
Reviewed 12/2/24  Follows with medical  Continue senna-docusate sodium dose per medical team.   No associated orders from this encounter found during lookback period of 72 hours.

## 2024-12-02 NOTE — ASSESSMENT & PLAN NOTE
Reviewed 12/2/24  Follows with medical  No associated orders from this encounter found during lookback period of 72 hours.

## 2024-12-02 NOTE — PROGRESS NOTES
"Progress Note - Behavioral Health   Name: Alberto Berumen 29 y.o. male I MRN: [id number]  Unit/Bed#: [facility] 112-02 I Date of Admission: 3/29/2024   Date of Service: 12/2/2024 I Hospital Day: 248     Assessment & Plan  Schizoaffective disorder, bipolar type (HCC)    Reviewed on 12/2/2024.    Patient reports feelings of depression and anxiety, racing thoughts, hopelessness, low self-esteem, low motivation and low energy. He states he experiences mood swings \"sometimes\". He states he slept \"well\" last night. He states his mood is \"depressed\". He rates his depression as 7/10 this morning. He states he is depressed and anxious due to the voices in his head constantly telling him they are going to kill him and his family and that he is going to hell. He states his anxiety is 5/10 this morning. He states the voices are random people and not his own voice. He states he has been hearing the voices constantly for the past 2 years. He states the voices have not changed or said anything new recently. He states working out and walking/talking with peers helps him cope with the voices. He states he used the punching bag this weekend. He denies any visual hallucinations. He reports persecutory delusions regarding the threatening voices he is hearing but states he feels safe here. He denies grandiose, somatic or bizarre delusions. He reports passive death wishes \"sometimes\" but denies having them currently. He denies thoughts to harm self or others, SI, or HI.     Continue Clozapine 225 mg daily at bedtime for psychosis -- to consider further careful titration for Sxs depending on response  Continue to obtain CBC with diff, CRP, and CK (weekly on Tues), and a Clozapine level and BNP (every 2 weeks on Tues). ANC 4.71 11/26/24    Continue Abilify 30 mg once daily for psychosis  Continue Escitalopram 20 mg once daily for depression and anxiety  Continue Senna-Docusate sodium one 50 mg tablet daily before bed for constipation  - " decreased from two tablets daily on 11/3 due to reported loose stools  Continue propanolol 10 mg twice daily for anxiety   Continue amlodipine 5 mg PO daily     Pt remains on dual antipsychotic Tx due to failure on monotherapy      ACT team referral was made  No associated orders from this encounter found during lookback period of 72 hours.    Chronic idiopathic constipation  Reviewed 12/2/24  Follows with medical  Continue senna-docusate sodium dose per medical team.   No associated orders from this encounter found during lookback period of 72 hours.  GERD (gastroesophageal reflux disease)  Reviewed 12/2/24  Follows with medical  Continue famotidine 20 mg tablet BID per medical team   Continue glycopyrrolate 1 mg tablet BID AM and afternoon per medical team  Continue glycopyrrolate 2 mg tablet before bed per medical team  Continue pantoprazole EC 20 mg tablet every morning per medical team  No associated orders from this encounter found during lookback period of 72 hours.  Medical clearance for psychiatric admission  Reviewed 12/2/24  Ongoing by medical  No associated orders from this encounter found during lookback period of 72 hours.    Tobacco abuse  Reviewed 12/2/24  Continue to encourage cessation upon discharge  No associated orders from this encounter found during lookback period of 72 hours.  Elevated hemoglobin A1c  Reviewed 12/2/24  Follows with medical  No associated orders from this encounter found during lookback period of 72 hours.  Class 2 obesity in adult  Reviewed 12/2/24  Follows with medical  No associated orders from this encounter found during lookback period of 72 hours.    Primary hypertension  Reviewed 12/2/24  Follows with medical  No associated orders from this encounter found during lookback period of 72 hours.    Psychiatry Progress Note [facility]    Progress towards goals: Progressing    Review of systems: Unremarkable    Psychiatric Diagnosis: Schizoaffective  "disorder, bipolar type    Assessment  Overall Status:  Status quo- continues to feel anxious and depressed regarding voices  Certification Statement: The patient will continue to require additional inpatient hospital stay due to schizoaffective disorder, bipolar type and related symptoms       Medications: as above  Side effects from treatment: Denied  Medication changes: as above  Medication education: Risks side effects benefits and precautions of medications discussed with patient and they did verbalize an understanding about risks for metabolic syndrome from being on neuroleptics and tardive dyskinesia etc.  All medications reviewed and I recommend that they be continued for symptom management  Understanding of medications: Risks side effects benefits and precautions of medications discussed with patient and they did verbalize an understanding about risks for metabolic syndrome from being on neuroleptics and tardive dyskinesia etc., and patient appeared to have understanding.  Justification for dual anti-psychotics: Due to failure on monotherapy    Non-pharmacological treatments  Continue with individual, group, milieu and occupational therapy using recovery principles and psycho-education about accepting illness and the need for treatment.  Behavioral health checks every 7 minutes    Safety  Safety and communication plan established to target dynamic risk factors discussed above.    Discharge Plan   To live with aunt once [facility] approves funding on ACT team    Interval Progress: Pert treatment team, patient is compliant with meals and medications. He is pleasant and social on unit. He did not require any PRN psychotropic medications over the weekend or this morning. He used the punching bag this weekend to help cope with the voices. He had a 2.6 lbs increase. He attended 9/10 groups on Friday.   Today, patient evaluated by this writer and Dr. Rosario in [facility]. He states his mood is \"depressed\" due to the " "constant voices he hears telling him they are going to kill him and his family and that he is going to hell. He rates his depression as 7/10 this morning. He states he has heard the voices constantly the past 2 years but is trying to find positive ways to cope with them. He states working out and walking/talking with peers helps him cope and he used the punching bag over the weekend. He denies visual hallucinations. He reports persecutory delusions regarding the threatening voices but states he feels safe here on the unit. He denies any grandiose, somatic, or bizarre delusions. He reports feelings of depression, anxiety, racing thoughts, hopelessness, low motivation, low energy, and low self-esteem. He rates his anxiety as 5/10 this morning. He reports passive death wishes \"sometimes\" but denies any currently. He denies thoughts of harm to self or others, SI, or HI. He states he slept \"well\" last night and has a good appetite. He denies any medication side effects.     Acceptance by patient: Accepting of medications and treatments  Hopefulness in recovery: Hopeful about living with aunt upon discharge  Involved in reintegration process: Patient communication with aunt and sister on phone- patient had in-person visit with aunt and cousin on unit last Wednesday.  Trusting in relationship with psychiatrist: Present  Sleep: Good  Appetite: Good  Compliance with Medications: Compliant  Group attendance: 9/10 groups on Friday  Significant events: None in the past 24 hours    Mental Status Exam  Appearance: age appropriate, casually dressed, dressed appropriately  Behavior: cooperative, calm, good  eye contact  Speech: normal rate, normal volume, normal pitch, not pressured  Mood: \"depressed\"  Affect: constricted, mood-congruent  Thought Process: organized, logical, coherent, goal directed  Thought Content: Mild paranoia regarding threatening voices he hears  Perceptual Disturbances: Auditory hallucinations of voices " saying they are going to kill him and his family. Denies visual hallucinations when asked. Does not appear to be responding to internal stimuli  Sensorium: alert and oriented to person, place, time and situation  Cognition: recent and remote memory grossly intact  Consciousness: alert and awake  Attention: attention span and concentration are age appropriate  Intellect: appears to be of average intelligence  Insight: fair  Judgement: fair  Motor Activity: no abnormal movements     Vitals  Temp:  [97.6 °F (36.4 °C)-97.8 °F (36.6 °C)] 97.8 °F (36.6 °C)  HR:  [84-96] 84  Resp:  [18] 18  BP: (121-128)/(68-71) 128/68  SpO2:  [99 %] 99 %  No intake or output data in the 24 hours ending 12/02/24 1030    Lab Results: All Labs For Current Hospital Admission Reviewed  Results from last 7 days   Lab Units 11/26/24  0948   WBC Thousand/uL 8.88   RBC Million/uL 5.54   HEMOGLOBIN g/dL 13.0   HEMATOCRIT % 43.4   MCV fL 78*   PLATELETS Thousands/uL 272   TOTAL NEUT ABS Thousands/µL 4.71       Current Facility-Administered Medications   Medication Dose Route Frequency Provider Last Rate    acetaminophen  650 mg Oral Q4H PRN Jordan C Holter, DO      acetaminophen  650 mg Oral Q6H PRN HOLLI Lion      aluminum-magnesium hydroxide-simethicone  30 mL Oral Q4H PRN Jordan C Holter,       amLODIPine  5 mg Oral Daily HOLLI Lion      ARIPiprazole  30 mg Oral Daily Bora Rosario MD      Artificial Tears  1 drop Both Eyes Q3H PRN Jordan C Holter, DO      atropine  1 drop Sublingual  Harjeet Torres, DO      haloperidol lactate  2.5 mg Intramuscular Q4H PRN Max 4/day HOLLI Lion      And    LORazepam  1 mg Intramuscular Q4H PRN Max 4/day HOLLI Lion      And    benztropine  0.5 mg Intramuscular Q4H PRN Max 4/day HOLLI Lion      benztropine  1 mg Intramuscular Q4H PRN Max 6/day Jordan C Holter,       haloperidol lactate  5 mg Intramuscular Q4H PRN Max 4/day HOLLI Lion      And    LORazepam  2 mg  Intramuscular Q4H PRN Max 4/day HOLLI Lion      And    benztropine  1 mg Intramuscular Q4H PRN Max 4/day HOLLI Lion      benztropine  1 mg Oral Q4H PRN Max 6/day HOLLI Lion      benztropine  1 mg Oral Q4H PRN Max 6/day Ion FISCHER Holter, DO      bisacodyl  10 mg Rectal Daily PRN HOLLI Lion      calcium carbonate  500 mg Oral BID PRN HOLLI Monge      cloZAPine  225 mg Oral HS Hardik So MD      hydrOXYzine HCL  50 mg Oral Q6H PRN Max 4/day Ion  Holter, DO      Or    diphenhydrAMINE  50 mg Intramuscular Q6H PRN Ion FISCHER Holter, DO      hydrOXYzine HCL  50 mg Oral Q6H PRN Max 4/day HOLLI Lion      Or    diphenhydrAMINE  50 mg Intramuscular Q6H PRN HOLLI Lion      diphenhydrAMINE-zinc acetate   Topical BID PRN HOLLI Lion      docusate sodium  100 mg Oral BID Jourdan Dickens,       escitalopram  20 mg Oral Daily HOLLI Lion      famotidine  20 mg Oral BID PRN HOLLI Monge      fluticasone  1 spray Each Nare Daily PRN HOLLI Monge      glycopyrrolate  1 mg Oral BID (AM & Afternoon) Bora Rosario MD      glycopyrrolate  2 mg Oral HS Bora Rosario MD      haloperidol  1 mg Oral Q6H PRN HOLLI Lion      haloperidol  2.5 mg Oral Q4H PRN Max 4/day HOLLI Lion      haloperidol  5 mg Oral Q4H PRN Max 4/day HOLLI Lion      hydroCHLOROthiazide  12.5 mg Oral Daily HOLLI Galvan      hydrocortisone   Topical 4x Daily PRN HOLLI Lion      hydrOXYzine HCL  100 mg Oral Q6H PRN Max 4/day Ion FISCHER Holter, DO      Or    LORazepam  2 mg Intramuscular Q6H PRN Ion FISCHER Holter, DO      hydrOXYzine HCL  100 mg Oral Q6H PRN Max 4/day HOLLI Lion      Or    LORazepam  2 mg Intramuscular Q6H PRN HOLLI Lion      hydrOXYzine HCL  25 mg Oral Q6H PRN Max 4/day Jordan C Holter,       ibuprofen  600 mg Oral Q8H PRN HOLLI Lion      influenza vaccine  0.5  mL Intramuscular Once Bora Rosario MD      loratadine  10 mg Oral Daily Brina Guillen MD      magnesium hydroxide  30 mL Oral Once Jourdan Dickens, DO      melatonin  3 mg Oral HS PRN Bora Rosario MD      melatonin  9 mg Oral HS Bora Rosario MD      methocarbamol  500 mg Oral Q6H PRN HOLLI Lion      multivitamin-minerals  1 tablet Oral Daily Eileen Gonzaleznathan, HOLLI      nicotine polacrilex  2 mg Oral Q4H PRN Bora Rosario MD      OLANZapine  5 mg Oral Q4H PRN Max 3/day [facility] Holter, DO      Or    OLANZapine  2.5 mg Intramuscular Q4H PRN Max 3/day [facility] Holter, DO      OLANZapine  5 mg Oral Q3H PRN Max 3/day [facility] Holter, DO      Or    OLANZapine  5 mg Intramuscular Q3H PRN Max 3/day [facility] Holter, DO      OLANZapine  2.5 mg Oral Q4H PRN Max 6/day [facility] Holter, DO      ondansetron  4 mg Oral Q6H PRN HOLLI Lion      [facility]oprazole  [facility]      polyethylene glycol  17 g Oral Daily PRN HOLLI Ghotra      polyethylene glycol  17 g Oral Daily Jourdan Dickens, DO      propranolol  10 mg Oral Q12H KAYLIE HOLLI Lion      senna-docusate sodium  1 tablet Oral Daily PRN [facility] Holter, DO      senna-docusate sodium  2 tablet Oral HS Jourdan Dickens, DO      traZODone  50 mg Oral HS PRN HOLLI Lion      white petrolatum-mineral oil   Topical TID PRN HOLLI Lion         Counseling / Coordination of Care: Total floor / unit time spent today 15 minutes. Greater than 50% of total time was spent with the patient and / or family counseling and / or somewhat receptive to supportive listening and teaching positive coping skills to deal with symptom mangement.     Patient's Rights, confidentiality and exceptions to confidentiality, use of automated medical record, Behavioral Health Services staff access to medical record, and consent to treatment reviewed.    This note has been dictated and hence there may be problems with punctuation, spelling  and formatting and if anyone has any concerns please address them to Dr. Mora.  This note is not shared with patient due to potential for making patient's condition worse by knowing the content of the note.    RODRIGO Flores

## 2024-12-02 NOTE — PLAN OF CARE
Problem: Alteration in Thoughts and Perception  Goal: Agree to be compliant with medication regime, as prescribed and report medication side effects  Description: Interventions:  - Offer appropriate PRN medication and supervise ingestion; conduct AIMS, as needed   Outcome: Progressing  Goal: Attend and participate in unit activities, including therapeutic, recreational, and educational groups  Description: Interventions:  -Encourage Visitation and family involvement in care  Outcome: Progressing  Goal: Recognize dysfunctional thoughts, communicate reality-based thoughts at the time of discharge  Description: Interventions:  - Provide medication and psycho-education to assist patient in compliance and developing insight into his/her illness   Outcome: Progressing  Goal: Complete daily ADLs, including personal hygiene independently, as able  Description: Interventions:  - Observe, teach, and assist patient with ADLS  - Monitor and promote a balance of rest/activity, with adequate nutrition and elimination   Outcome: Progressing     Problem: Ineffective Coping  Goal: Identifies ineffective coping skills  Outcome: Progressing  Goal: Demonstrates healthy coping skills  Outcome: Progressing  Goal: Participates in unit activities  Description: Interventions:  - Provide therapeutic environment   - Provide required programming   - Redirect inappropriate behaviors   Outcome: Progressing     Problem: Depression  Goal: Refrain from harming self  Description: Interventions:  - Monitor patient closely, per order   - Supervise medication ingestion, monitor effects and side effects   Outcome: Progressing  Goal: Refrain from isolation  Description: Interventions:  - Develop a trusting relationship   - Encourage socialization   Outcome: Progressing  Goal: Attend and participate in unit activities, including therapeutic, recreational, and educational groups  Description: Interventions:  - Provide therapeutic and educational  activities daily, encourage attendance and participation, and document same in the medical record   Outcome: Progressing     Problem: Alteration in Thoughts and Perception  Goal: Agree to be compliant with medication regime, as prescribed and report medication side effects  Description: Interventions:  - Offer appropriate PRN medication and supervise ingestion; conduct AIMS, as needed   Outcome: Progressing  Goal: Attend and participate in unit activities, including therapeutic, recreational, and educational groups  Description: Interventions:  -Encourage Visitation and family involvement in care  Outcome: Progressing  Goal: Recognize dysfunctional thoughts, communicate reality-based thoughts at the time of discharge  Description: Interventions:  - Provide medication and psycho-education to assist patient in compliance and developing insight into his/her illness   Outcome: Progressing  Goal: Complete daily ADLs, including personal hygiene independently, as able  Description: Interventions:  - Observe, teach, and assist patient with ADLS  - Monitor and promote a balance of rest/activity, with adequate nutrition and elimination   Outcome: Progressing     Problem: Ineffective Coping  Goal: Identifies ineffective coping skills  Outcome: Progressing  Goal: Demonstrates healthy coping skills  Outcome: Progressing  Goal: Participates in unit activities  Description: Interventions:  - Provide therapeutic environment   - Provide required programming   - Redirect inappropriate behaviors   Outcome: Progressing     Problem: Depression  Goal: Refrain from harming self  Description: Interventions:  - Monitor patient closely, per order   - Supervise medication ingestion, monitor effects and side effects   Outcome: Progressing  Goal: Refrain from isolation  Description: Interventions:  - Develop a trusting relationship   - Encourage socialization   Outcome: Progressing  Goal: Attend and participate in unit activities, including  therapeutic, recreational, and educational groups  Description: Interventions:  - Provide therapeutic and educational activities daily, encourage attendance and participation, and document same in the medical record   Outcome: Progressing

## 2024-12-02 NOTE — NURSING NOTE
Pt is calm and cooperative, visible on the unit, social with peers and staff. Pt continues to report AH. Pt refused breakfast and ate 100% of lunch. Pt is medication compliant, safety checks ongoing.

## 2024-12-02 NOTE — ASSESSMENT & PLAN NOTE
Reviewed 12/2/24  Follows with medical  Continue famotidine 20 mg tablet BID per medical team   Continue glycopyrrolate 1 mg tablet BID AM and afternoon per medical team  Continue glycopyrrolate 2 mg tablet before bed per medical team  Continue pantoprazole EC 20 mg tablet every morning per medical team  No associated orders from this encounter found during lookback period of 72 hours.

## 2024-12-02 NOTE — NURSING NOTE
Patient requested Nicorette gum at this time.  Patient visible in the dining room most of this shift. Patient interacting with staff and peers. Behaviors controlled and appropriate.  Patient calm and cooperative. Offered no complaints and did not report any auditory hallucinations.

## 2024-12-02 NOTE — PROGRESS NOTES
12/02/24 0808   Team Meeting   Meeting Type Daily Rounds   Team Members Present   Team Members Present Physician;Nurse;;Other (Discipline and Name)   Physician Team Member Joaquina Rosario PA-C   Nursing Team Member Claudia   Social Work Team Member Jose J ORTEGA   Patient/Family Present   Patient Present No   Patient's Family Present No     Groups Participation 9/10.   Patient's compliant with medications. [facility] funding to open and will d/c home to Aunt with their services.

## 2024-12-02 NOTE — NURSING NOTE
Patient slept well throughout the night.(At least 8 hours)  Appears to be resting comfortably at this time. Eyes closed and chest movements noted

## 2024-12-03 PROCEDURE — 99232 SBSQ HOSP IP/OBS MODERATE 35: CPT | Performed by: PSYCHIATRY & NEUROLOGY

## 2024-12-03 RX ADMIN — ESCITALOPRAM OXALATE 20 MG: 10 TABLET, FILM COATED ORAL at 09:24

## 2024-12-03 RX ADMIN — NICOTINE POLACRILEX 2 MG: 2 GUM, CHEWING ORAL at 21:19

## 2024-12-03 RX ADMIN — MULTIPLE VITAMINS W/ MINERALS TAB 1 TABLET: TAB ORAL at 09:24

## 2024-12-03 RX ADMIN — MELATONIN TAB 3 MG 9 MG: 3 TAB at 21:20

## 2024-12-03 RX ADMIN — DOCUSATE SODIUM 100 MG: 100 CAPSULE, LIQUID FILLED ORAL at 17:07

## 2024-12-03 RX ADMIN — SENNOSIDES AND DOCUSATE SODIUM 2 TABLET: 50; 8.6 TABLET ORAL at 21:20

## 2024-12-03 RX ADMIN — PROPRANOLOL HYDROCHLORIDE 10 MG: 10 TABLET ORAL at 09:24

## 2024-12-03 RX ADMIN — ARIPIPRAZOLE 30 MG: 15 TABLET ORAL at 09:24

## 2024-12-03 RX ADMIN — ATROPINE SULFATE 1 DROP: 10 SOLUTION/ DROPS OPHTHALMIC at 21:19

## 2024-12-03 RX ADMIN — HYDROCHLOROTHIAZIDE 12.5 MG: 12.5 TABLET ORAL at 09:24

## 2024-12-03 RX ADMIN — PANTOPRAZOLE SODIUM 20 MG: 20 TABLET, DELAYED RELEASE ORAL at 09:23

## 2024-12-03 RX ADMIN — NICOTINE POLACRILEX 2 MG: 2 GUM, CHEWING ORAL at 17:07

## 2024-12-03 RX ADMIN — GLYCOPYRROLATE 1 MG: 1 TABLET ORAL at 09:26

## 2024-12-03 RX ADMIN — NICOTINE POLACRILEX 2 MG: 2 GUM, CHEWING ORAL at 09:23

## 2024-12-03 RX ADMIN — GLYCOPYRROLATE 2 MG: 1 TABLET ORAL at 21:19

## 2024-12-03 RX ADMIN — PROPRANOLOL HYDROCHLORIDE 10 MG: 10 TABLET ORAL at 21:20

## 2024-12-03 RX ADMIN — LORATADINE 10 MG: 10 TABLET ORAL at 09:24

## 2024-12-03 RX ADMIN — CLOZAPINE 225 MG: 25 TABLET ORAL at 21:20

## 2024-12-03 RX ADMIN — GLYCOPYRROLATE 1 MG: 1 TABLET ORAL at 14:54

## 2024-12-03 RX ADMIN — DOCUSATE SODIUM 100 MG: 100 CAPSULE, LIQUID FILLED ORAL at 09:23

## 2024-12-03 NOTE — ASSESSMENT & PLAN NOTE
Reviewed 12/3/24  Continue to encourage cessation upon discharge  No associated orders from this encounter found during lookback period of 72 hours.

## 2024-12-03 NOTE — ASSESSMENT & PLAN NOTE
Reviewed 12/3/24  Ongoing by medical  No associated orders from this encounter found during lookback period of 72 hours.

## 2024-12-03 NOTE — SOCIAL WORK
This writer met with patient for a patient check in meeting. Patient reports he is doing well, but continues to experience AH and depression. He states he's using his coping skills to manage these symptoms. This writer discussed obtaining his picture ID.

## 2024-12-03 NOTE — ASSESSMENT & PLAN NOTE
Reviewed 12/3/24  Follows with medical  No associated orders from this encounter found during lookback period of 72 hours.

## 2024-12-03 NOTE — PROGRESS NOTES
12/03/24 0807   Team Meeting   Meeting Type Daily Rounds   Team Members Present   Team Members Present Physician;Nurse;;Other (Discipline and Name)   Physician Team Member Desyi VALDEZ   Nursing Team Member Claudia   Social Work Team Member Jose J ORTEGA   Patient/Family Present   Patient Present No   Patient's Family Present No     Groups Participation  9/9.   Patient's compliant with medications. He's engaged in his treatment. [facility] to discharge home with family.  Continues to experience AH.

## 2024-12-03 NOTE — ASSESSMENT & PLAN NOTE
Reviewed 12/3/24  Follows with medical  Continue senna-docusate sodium dose per medical team.   No associated orders from this encounter found during lookback period of 72 hours.

## 2024-12-03 NOTE — NURSING NOTE
Alberto maintained on ongoing SAFE precaution without incident on this shift. Awake, alert,,pleasant and cooperative.   Attended and participated in 9 out of 9 groups today.  Spend most of his time writing what appears to be poem or donis to music during coping skills.  He is social with a female peer, no inappropriate behavior noted. . Continues to be compliant with medication regimen,. Admit continues to have he same auditory hallucination about someone is going to hurt him and his family. He is coping with the voices by writing and sometimes using the pushing bag in the gym.

## 2024-12-03 NOTE — ASSESSMENT & PLAN NOTE
Reviewed 12/3/24  Follows with medical  Continue famotidine 20 mg tablet BID per medical team   Continue glycopyrrolate 1 mg tablet BID AM and afternoon per medical team  Continue glycopyrrolate 2 mg tablet before bed per medical team  Continue pantoprazole EC 20 mg tablet every morning per medical team  No associated orders from this encounter found during lookback period of 72 hours.

## 2024-12-03 NOTE — PLAN OF CARE
Problem: Alteration in Thoughts and Perception  Goal: Treatment Goal: Gain control of psychotic behaviors/thinking, reduce/eliminate presenting symptoms and demonstrate improved reality functioning upon discharge  Outcome: Progressing  Goal: Refrain from acting on delusional thinking/internal stimuli  Description: Interventions:  - Monitor patient closely, per order   - Utilize least restrictive measures   - Set reasonable limits, give positive feedback for acceptable   - Administer medications as ordered and monitor of potential side effects  Outcome: Progressing  Goal: Agree to be compliant with medication regime, as prescribed and report medication side effects  Description: Interventions:  - Offer appropriate PRN medication and supervise ingestion; conduct AIMS, as needed   Outcome: Progressing  Goal: Attend and participate in unit activities, including therapeutic, recreational, and educational groups  Description: Interventions:  -Encourage Visitation and family involvement in care  Outcome: Progressing  Goal: Recognize dysfunctional thoughts, communicate reality-based thoughts at the time of discharge  Description: Interventions:  - Provide medication and psycho-education to assist patient in compliance and developing insight into his/her illness   Outcome: Progressing  Goal: Complete daily ADLs, including personal hygiene independently, as able  Description: Interventions:  - Observe, teach, and assist patient with ADLS  - Monitor and promote a balance of rest/activity, with adequate nutrition and elimination   Outcome: Progressing     Problem: Ineffective Coping  Goal: Patient/Family participate in treatment and DC plans  Description: Interventions:  - Provide therapeutic environment  Outcome: Progressing     Problem: Depression  Goal: Treatment Goal: Demonstrate behavioral control of depressive symptoms, verbalize feelings of improved mood/affect, and adopt new coping skills prior to discharge  Outcome:  Progressing  Goal: Verbalize thoughts and feelings  Description: Interventions:  - Assess and re-assess patient's level of risk   - Engage patient in 1:1 interactions, daily, for a minimum of 15 minutes   - Encourage patient to express feelings, fears, frustrations, hopes   Outcome: Progressing  Goal: Refrain from harming self  Description: Interventions:  - Monitor patient closely, per order   - Supervise medication ingestion, monitor effects and side effects   Outcome: Progressing  Goal: Refrain from isolation  Description: Interventions:  - Develop a trusting relationship   - Encourage socialization   Outcome: Progressing  Goal: Refrain from self-neglect  Outcome: Progressing  Goal: Attend and participate in unit activities, including therapeutic, recreational, and educational groups  Description: Interventions:  - Provide therapeutic and educational activities daily, encourage attendance and participation, and document same in the medical record   Outcome: Progressing     Problem: Anxiety  Goal: Anxiety is at manageable level  Description: Interventions:  - Assess and monitor patient's anxiety level.   - Monitor for signs and symptoms (heart palpitations, chest pain, shortness of breath, headaches, nausea, feeling jumpy, restlessness, irritable, apprehensive).   - Collaborate with interdisciplinary team and initiate plan and interventions as ordered.  - [place] patient to unit/surroundings  - Explain treatment plan  - Encourage participation in care  - Encourage verbalization of concerns/fears  - Identify coping mechanisms  - Assist in developing anxiety-reducing skills  - Administer/offer alternative therapies  - Limit or eliminate stimulants  Outcome: Progressing     Problem: Alteration in Orientation  Goal: Treatment Goal: Demonstrate a reduction of confusion and improved orientation to person, place, time and/or situation upon discharge, according to optimum baseline/ability  Outcome: Progressing  Goal:  Interact with staff daily  Description: Interventions:  - Assess and re-assess patient's level of orientation  - Engage patient in 1 on 1 interactions, daily, for a minimum of 15 minutes   - Establish rapport/trust with patient   Outcome: Progressing  Goal: Cooperate with recommended testing/procedures  Description: Interventions:  - Determine need for ancillary testing  - Observe for mental status changes  - Implement falls/precaution protocol   Outcome: Progressing  Goal: Complete daily ADLs, including personal hygiene independently, as able  Description: Interventions:  - Observe, teach, and assist patient with ADLS  Outcome: Progressing

## 2024-12-03 NOTE — ASSESSMENT & PLAN NOTE
"  Reviewed on 12/3/2024.    Patient reports feelings of depression and anxiety, racing thoughts, hopelessness, low self-esteem, and low energy. He states he experiences mood swings \"sometimes but rarely\". He states his motivation today is \"medium\". He states he slept \"well\" last night. He states his mood is \"depressed\". He rates his depression as 7/10 this morning. He states he is depressed and anxious due to the voices in his head constantly telling him they are going to kill him and his family and that he is going to hell. He states his anxiety is 5/10 this morning. He states the voices are random people, some friends voices he recognizes and not his own voice. He states he has been hearing the voices constantly for the past 2-3 years. He states the voices have not changed or said anything new recently. He states working out using the punching bag, listening to music and walking/talking with peers helps him cope with the voices. He states he used the punching bag yesterday with other peers. He denies any visual hallucinations. He reports persecutory delusions regarding the threatening voices he is hearing but states he feels safe here and trusts that his family is safe. He denies grandiose, somatic or bizarre delusions. He reports passive death wishes \"sometimes\" but denies having them currently. He denies thoughts to harm self or others, SI, or HI.     Continue Clozapine 225 mg daily at bedtime for psychosis -- to consider further careful titration for Sxs depending on response  Continue to obtain CBC with diff, CRP, and CK (weekly on Tues), and a Clozapine level and BNP (every 2 weeks on Tues). ANC 4.71 11/26/24    Continue Abilify 30 mg once daily for psychosis  Continue Escitalopram 20 mg once daily for depression and anxiety  Continue Senna-Docusate sodium one 50 mg tablet daily before bed for constipation  - decreased from two tablets daily on 11/3 due to reported loose stools  Continue propanolol 10 mg " twice daily for anxiety   Continue amlodipine 5 mg PO daily     Pt remains on dual antipsychotic Tx due to failure on monotherapy      ACT team referral was made  No associated orders from this encounter found during lookback period of 72 hours.

## 2024-12-03 NOTE — PROGRESS NOTES
"Progress Note - Behavioral Health   Name: Alberto Berumen 29 y.o. male I MRN: [id number]  Unit/Bed#: [facility] 112-02 I Date of Admission: 3/29/2024   Date of Service: 12/3/2024 I Hospital Day: 249     Assessment & Plan  Schizoaffective disorder, bipolar type (HCC)    Reviewed on 12/3/2024.    Patient reports feelings of depression and anxiety, racing thoughts, hopelessness, low self-esteem, and low energy. He states he experiences mood swings \"sometimes but rarely\". He states his motivation today is \"medium\". He states he slept \"well\" last night. He states his mood is \"depressed\". He rates his depression as 7/10 this morning. He states he is depressed and anxious due to the voices in his head constantly telling him they are going to kill him and his family and that he is going to hell. He states his anxiety is 5/10 this morning. He states the voices are random people, some friends voices he recognizes and not his own voice. He states he has been hearing the voices constantly for the past 2-3 years. He states the voices have not changed or said anything new recently. He states working out using the punching bag, listening to music and walking/talking with peers helps him cope with the voices. He states he used the punching bag yesterday with other peers. He denies any visual hallucinations. He reports persecutory delusions regarding the threatening voices he is hearing but states he feels safe here and trusts that his family is safe. He denies grandiose, somatic or bizarre delusions. He reports passive death wishes \"sometimes\" but denies having them currently. He denies thoughts to harm self or others, SI, or HI.     Continue Clozapine 225 mg daily at bedtime for psychosis -- to consider further careful titration for Sxs depending on response  Continue to obtain CBC with diff, CRP, and CK (weekly on Tues), and a Clozapine level and BNP (every 2 weeks on Tues). ANC 4.71 11/26/24    Continue Abilify 30 mg once daily for " psychosis  Continue Escitalopram 20 mg once daily for depression and anxiety  Continue Senna-Docusate sodium one 50 mg tablet daily before bed for constipation  - decreased from two tablets daily on 11/3 due to reported loose stools  Continue propanolol 10 mg twice daily for anxiety   Continue amlodipine 5 mg PO daily     Pt remains on dual antipsychotic Tx due to failure on monotherapy      ACT team referral was made  No associated orders from this encounter found during lookback period of 72 hours.    Chronic idiopathic constipation  Reviewed 12/3/24  Follows with medical  Continue senna-docusate sodium dose per medical team.   No associated orders from this encounter found during lookback period of 72 hours.  GERD (gastroesophageal reflux disease)  Reviewed 12/3/24  Follows with medical  Continue famotidine 20 mg tablet BID per medical team   Continue glycopyrrolate 1 mg tablet BID AM and afternoon per medical team  Continue glycopyrrolate 2 mg tablet before bed per medical team  Continue pantoprazole EC 20 mg tablet every morning per medical team  No associated orders from this encounter found during lookback period of 72 hours.  Medical clearance for psychiatric admission  Reviewed 12/3/24  Ongoing by medical  No associated orders from this encounter found during lookback period of 72 hours.    Tobacco abuse  Reviewed 12/3/24  Continue to encourage cessation upon discharge  No associated orders from this encounter found during lookback period of 72 hours.  Elevated hemoglobin A1c  Reviewed 12/3/24  Follows with medical  No associated orders from this encounter found during lookback period of 72 hours.  Class 2 obesity in adult  Reviewed 12/3/24  Follows with medical  No associated orders from this encounter found during lookback period of 72 hours.    Primary hypertension  Reviewed 12/3/24  Follows with medical  No associated orders from this encounter found during lookback period of 72 hours.    Psychiatry  Progress Note [facility]    Progress towards goals: Progressing    Review of systems: Unremarkable    Psychiatric Diagnosis: Schizoaffective disorder, bipolar type    Assessment  Overall Status:  Status quo- continues to feel anxious and depressed regarding voices  Certification Statement: The patient will continue to require additional inpatient hospital stay due to schizoaffective disorder, bipolar type and related symptoms       Medications: as above  Side effects from treatment: Denied  Medication changes: as above  Medication education: Risks side effects benefits and precautions of medications discussed with patient and they did verbalize an understanding about risks for metabolic syndrome from being on neuroleptics and tardive dyskinesia etc.  All medications reviewed and I recommend that they be continued for symptom management  Understanding of medications: Risks side effects benefits and precautions of medications discussed with patient and they did verbalize an understanding about risks for metabolic syndrome from being on neuroleptics and tardive dyskinesia etc., and patient appeared to have understanding.  Justification for dual anti-psychotics: Due to failure on monotherapy    Non-pharmacological treatments  Continue with individual, group, milieu and occupational therapy using recovery principles and psycho-education about accepting illness and the need for treatment.  Behavioral health checks every 7 minutes    Safety  Safety and communication plan established to target dynamic risk factors discussed above.    Discharge Plan   To live with aunt once [facility] approves funding on ACT team    Interval Progress: Per treatment team, patient is compliant with meals and medications. He is pleasant and cooperative on unit. He did not require any PRN psychotropic medications yesterday or this morning. He used the punching bag yesterday with peers to help cope with the voices. Team reports  "the patient knows the voices he hears are not real and that his family is safe. He attended 9/9 groups yesterday.     Today, patient evaluated by this writer and Dr. Holter in [facility]. He states his mood is \"depressed\" due to the constant voices he hears saying they are going to kill him and his family. He rates his depression as 7/10 this morning. He states he has heard the voices constantly for the past 2 or 3 years but is trying to find positive ways to cope with them such as listening to music and working out. He denies visual hallucinations. He reports persecutory delusions regarding the threatening voices but states he feels safe here and believes his family is safe. He denies any grandiose, somatic, or bizarre delusions. He reports feelings of depression, anxiety, racing thoughts, low energy, and low self-esteem. He states his motivation today is \"medium\" and he has mood swings \"rarely\". He reports passive death wishes \"sometimes\" but denies them currently. He denies thoughts of harm to self of others, SI or HI. He states he slept \"well\" last night and has a good appetite. He states he is trying to eat healthier and work out to stop gaining weight. He denies any medication side effects.     Acceptance by patient: Accepting of medications and treatments  Hopefulness in recovery: Hopeful about living with aunt upon discharge.  Involved in reintegration process: Patient communication with aunt and sister on phone- patient had in-person visit with aunt and cousin last week.   Trusting in relationship with psychiatrist: Present  Sleep: Good  Appetite: Good  Compliance with Medications: Compliant  Group attendance: 9/9 groups yesterday  Significant events: None in the past 24 hours    Mental Status Exam  Appearance: age appropriate, casually dressed, dressed appropriately  Behavior: pleasant, cooperative, calm, good  eye contact  Speech: normal rate, normal volume, normal pitch, not pressured  Mood: " "\"depressed\"  Affect: constricted, mood-congruent  Thought Process: organized, logical, coherent, goal directed, linear  Thought Content: Mild paranoia regarding threatening voices he hears  Perceptual Disturbances: Auditory hallucinations of voices saying they are going to kill him and his family. Denies visual hallucinations when asked. Does not appear to be responding to internal stimuli.  Sensorium: alert and oriented to person, place, time and situation  Cognition: recent and remote memory grossly intact  Consciousness: alert and awake  Attention: attention span and concentration are age appropriate  Intellect: appears to be of average intelligence  Insight: fair  Judgement: fair  Motor Activity: no abnormal movements     Vitals  Temp:  [97.7 °F (36.5 °C)] 97.7 °F (36.5 °C)  HR:  [89-94] 89  Resp:  [18] 18  BP: (117-131)/(77-78) 117/77  SpO2:  [98 %] 98 %  No intake or output data in the 24 hours ending 12/03/24 1009    Lab Results: All Labs For Current Hospital Admission Reviewed        Current Facility-Administered Medications   Medication Dose Route Frequency Provider Last Rate    acetaminophen  650 mg Oral Q4H PRN Jordan C Holter, DO      acetaminophen  650 mg Oral Q6H PRN HOLLI Lion      aluminum-magnesium hydroxide-simethicone  30 mL Oral Q4H PRN Jordan C Holter, DO      amLODIPine  5 mg Oral Daily HOLLI Lion      ARIPiprazole  30 mg Oral Daily Bora Rosario MD      Artificial Tears  1 drop Both Eyes Q3H PRN Jordan C Holter, DO      atropine  1 drop Sublingual  Harjeet Torres, DO      haloperidol lactate  2.5 mg Intramuscular Q4H PRN Max 4/day HOLLI Lion      And    LORazepam  1 mg Intramuscular Q4H PRN Max 4/day HOLLI Lion      And    benztropine  0.5 mg Intramuscular Q4H PRN Max 4/day HOLLI Lion      benztropine  1 mg Intramuscular Q4H PRN Max 6/day Jordan C Holter, DO      haloperidol lactate  5 mg Intramuscular Q4H PRN Max 4/day HOLLI Lion      And    " LORazepam  2 mg Intramuscular Q4H PRN Max 4/day HOLLI Lion      And    benztropine  1 mg Intramuscular Q4H PRN Max 4/day HOLLI Lion      benztropine  1 mg Oral Q4H PRN Max 6/day HOLLI Lion      benztropine  1 mg Oral Q4H PRN Max 6/day Ion  Holter, DO      bisacodyl  10 mg Rectal Daily PRN HOLLI Lion      calcium carbonate  500 mg Oral BID PRN HOLLI Monge      cloZAPine  225 mg Oral HS Hardik So MD      hydrOXYzine HCL  50 mg Oral Q6H PRN Max 4/day [facility] Holter, DO      Or    diphenhydrAMINE  50 mg Intramuscular Q6H PRN [facility] Holter, DO      hydrOXYzine HCL  50 mg Oral Q6H PRN Max 4/day HOLLI Lion      Or    diphenhydrAMINE  50 mg Intramuscular Q6H PRN HOLLI Lion      diphenhydrAMINE-zinc acetate   Topical BID PRN HOLLI Lion      docusate sodium  100 mg Oral BID Jourdan Dickens,       escitalopram  20 mg Oral Daily HOLLI Lion      famotidine  20 mg Oral BID PRN HOLLI Monge      fluticasone  1 spray Each Nare Daily PRN HOLLI Monge      glycopyrrolate  1 mg Oral BID (AM & Afternoon) Bora Rosario MD      glycopyrrolate  2 mg Oral HS Bora Rosario MD      haloperidol  1 mg Oral Q6H PRN HOLLI Lion      haloperidol  2.5 mg Oral Q4H PRN Max 4/day HOLLI Lion      haloperidol  5 mg Oral Q4H PRN Max 4/day HOLLI Lion      hydroCHLOROthiazide  12.5 mg Oral Daily HOLLI Galvan      hydrocortisone   Topical 4x Daily PRN HOLLI Lion      hydrOXYzine HCL  100 mg Oral Q6H PRN Max 4/day [facility] Holter, DO      Or    LORazepam  2 mg Intramuscular Q6H PRN Ion  Holter, DO      hydrOXYzine HCL  100 mg Oral Q6H PRN Max 4/day HOLLI Lion      Or    LORazepam  2 mg Intramuscular Q6H PRN HOLLI Lion      hydrOXYzine HCL  25 mg Oral Q6H PRN Max 4/day Jordan C Holter, DO      ibuprofen  600 mg Oral Q8H PRN HOLLI Lion       influenza vaccine  0.5 mL Intramuscular Once Bora Rosario MD      loratadine  10 mg Oral Daily Brina Guillen MD      magnesium hydroxide  30 mL Oral Once Jourdan Dickens, DO      melatonin  3 mg Oral HS PRN Bora Rosario MD      melatonin  9 mg Oral HS Bora Rosario MD      methocarbamol  500 mg Oral Q6H PRN HOLLI Lion      multivitamin-minerals  1 tablet Oral Daily Eileen Gonzaleznathan, HOLLI      nicotine polacrilex  2 mg Oral Q4H PRN Bora Rosario MD      OLANZapine  5 mg Oral Q4H PRN Max 3/day [facility] Holter, DO      Or    OLANZapine  2.5 mg Intramuscular Q4H PRN Max 3/day [facility] Holter, DO      OLANZapine  5 mg Oral Q3H PRN Max 3/day [facility] Holter, DO      Or    OLANZapine  5 mg Intramuscular Q3H PRN Max 3/day [facility] Holter, DO      OLANZapine  2.5 mg Oral Q4H PRN Max 6/day [facility] Holter, DO      ondansetron  4 mg Oral Q6H PRN HOLLI Lion      [facility]razole  [facility]      polyethylene glycol  17 g Oral Daily PRN HOLLI Ghotra      polyethylene glycol  17 g Oral Daily Jourdan Dickens, DO      propranolol  10 mg Oral Q12H KAYLIE HOLLI Lion      senna-docusate sodium  1 tablet Oral Daily PRN [facility] Holter, DO      senna-docusate sodium  2 tablet Oral HS Jourdan Dickens, DO      traZODone  50 mg Oral HS PRN HOLLI Lion      white petrolatum-mineral oil   Topical TID PRN HOLLI Lion         Counseling / Coordination of Care: Total floor / unit time spent today 15 minutes. Greater than 50% of total time was spent with the patient and / or family counseling and / or somewhat receptive to supportive listening and teaching positive coping skills to deal with symptom mangement.     Patient's Rights, confidentiality and exceptions to confidentiality, use of automated medical record, Behavioral Health Services staff access to medical record, and consent to treatment reviewed.    This note has been dictated and hence there may be problems with  punctuation, spelling and formatting and if anyone has any concerns please address them to Dr. Mora.  This note is not shared with patient due to potential for making patient's condition worse by knowing the content of the note.    RODRIGO Flores

## 2024-12-04 PROCEDURE — 99232 SBSQ HOSP IP/OBS MODERATE 35: CPT | Performed by: PSYCHIATRY & NEUROLOGY

## 2024-12-04 RX ADMIN — GLYCOPYRROLATE 2 MG: 1 TABLET ORAL at 21:09

## 2024-12-04 RX ADMIN — MELATONIN TAB 3 MG 9 MG: 3 TAB at 21:09

## 2024-12-04 RX ADMIN — NICOTINE POLACRILEX 2 MG: 2 GUM, CHEWING ORAL at 12:42

## 2024-12-04 RX ADMIN — DOCUSATE SODIUM 100 MG: 100 CAPSULE, LIQUID FILLED ORAL at 08:35

## 2024-12-04 RX ADMIN — ARIPIPRAZOLE 30 MG: 15 TABLET ORAL at 08:35

## 2024-12-04 RX ADMIN — NICOTINE POLACRILEX 2 MG: 2 GUM, CHEWING ORAL at 08:35

## 2024-12-04 RX ADMIN — GLYCOPYRROLATE 1 MG: 1 TABLET ORAL at 15:17

## 2024-12-04 RX ADMIN — MULTIPLE VITAMINS W/ MINERALS TAB 1 TABLET: TAB ORAL at 08:36

## 2024-12-04 RX ADMIN — GLYCOPYRROLATE 1 MG: 1 TABLET ORAL at 08:35

## 2024-12-04 RX ADMIN — NICOTINE POLACRILEX 2 MG: 2 GUM, CHEWING ORAL at 17:38

## 2024-12-04 RX ADMIN — AMLODIPINE BESYLATE 5 MG: 5 TABLET ORAL at 08:36

## 2024-12-04 RX ADMIN — PROPRANOLOL HYDROCHLORIDE 10 MG: 10 TABLET ORAL at 21:09

## 2024-12-04 RX ADMIN — ESCITALOPRAM OXALATE 20 MG: 10 TABLET, FILM COATED ORAL at 08:35

## 2024-12-04 RX ADMIN — ATROPINE SULFATE 1 DROP: 10 SOLUTION/ DROPS OPHTHALMIC at 21:10

## 2024-12-04 RX ADMIN — CLOZAPINE 225 MG: 25 TABLET ORAL at 21:09

## 2024-12-04 RX ADMIN — DOCUSATE SODIUM 100 MG: 100 CAPSULE, LIQUID FILLED ORAL at 17:16

## 2024-12-04 RX ADMIN — SENNOSIDES AND DOCUSATE SODIUM 2 TABLET: 50; 8.6 TABLET ORAL at 21:09

## 2024-12-04 RX ADMIN — HYDROCHLOROTHIAZIDE 12.5 MG: 12.5 TABLET ORAL at 08:35

## 2024-12-04 RX ADMIN — PANTOPRAZOLE SODIUM 20 MG: 20 TABLET, DELAYED RELEASE ORAL at 08:35

## 2024-12-04 RX ADMIN — PROPRANOLOL HYDROCHLORIDE 10 MG: 10 TABLET ORAL at 08:35

## 2024-12-04 RX ADMIN — LORATADINE 10 MG: 10 TABLET ORAL at 08:36

## 2024-12-04 NOTE — NURSING NOTE
"Alberto was calm and cooperative during assessment. He denies current SI, SH, HI, and VH. He reports mild generalized anxiety and depression and AH that state \"it would hurt me and my family.\" He states that he feels safe in the hospital and does not believe the AH can actually harm him or his family members. He was medication compliant and requested nicorette gum as a PRN. He attended meals and group programming in the milieu. He is social with peers and staff. He denies new concerns at this time.   "

## 2024-12-04 NOTE — ASSESSMENT & PLAN NOTE
Reviewed 12/4/24  Follows with medical  No associated orders from this encounter found during lookback period of 72 hours.

## 2024-12-04 NOTE — PROGRESS NOTES
"Progress Note - Behavioral Health   Name: Alberto Berumen 29 y.o. male I MRN: [id number]  Unit/Bed#: [facility] 112-02 I Date of Admission: 3/29/2024   Date of Service: 12/4/2024 I Hospital Day: 250     Assessment & Plan  Schizoaffective disorder, bipolar type (HCC)    Reviewed on 12/4/2024.    Patient reports feelings of depression and anxiety, racing thoughts, hopelessness, low self-esteem, and low energy. He states he experiences mood swings \"sometimes but rarely\". He states his motivation today is \"decent\" today. He states he slept \"well\" last night. He states his mood is \"depressed and tired\". He rates his depression as 6/10 this morning. He states he is depressed and anxious due to the voices in his head constantly telling him they are going to kill him and his family and that he is going to hell. He states his anxiety is 6/10 this morning. He states the voices are random people, some friends voices he recognizes, and never his own voice. He states he has been hearing the voices constantly for the past 2-3 years. He states the voices have not changed or said anything new recently. He states working out using the punching bag, listening to music and walking/talking with peers helps him cope with the voices. He states he did not use the punching bag yesterday due to being too \"full\" from lunch but he plans to use it today. He denies any visual hallucinations. He reports persecutory delusions regarding the threatening voices he is hearing but states he feels safe here. He is worried about the safety of his family but states speaking to them on the phone daily reassures him they are safe. He denies grandiose, somatic or bizarre delusions. He reports passive death wishes \"sometimes\" but denies having them currently. He denies thoughts to harm self or others, SI, or HI.     Continue Clozapine 225 mg daily at bedtime for psychosis -- to consider further careful titration for Sxs depending on response  Continue to obtain " CBC with diff, CRP, and CK (weekly on Tues), and a Clozapine level and BNP (every 2 weeks on Tues). ANC 4.71 11/26/24    Continue Abilify 30 mg once daily for psychosis  Continue Escitalopram 20 mg once daily for depression and anxiety  Continue Senna-Docusate sodium one 50 mg tablet daily before bed for constipation  - decreased from two tablets daily on 11/3 due to reported loose stools  Continue propanolol 10 mg twice daily for anxiety   Continue amlodipine 5 mg PO daily     Pt remains on dual antipsychotic Tx due to failure on monotherapy      ACT team referral was made  No associated orders from this encounter found during lookback period of 72 hours.    Chronic idiopathic constipation  Reviewed 12/4/24  Follows with medical  Continue senna-docusate sodium dose per medical team.   No associated orders from this encounter found during lookback period of 72 hours.  GERD (gastroesophageal reflux disease)  Reviewed 12/4/24  Follows with medical  Continue famotidine 20 mg tablet BID per medical team   Continue glycopyrrolate 1 mg tablet BID AM and afternoon per medical team  Continue glycopyrrolate 2 mg tablet before bed per medical team  Continue pantoprazole EC 20 mg tablet every morning per medical team  No associated orders from this encounter found during lookback period of 72 hours.  Medical clearance for psychiatric admission  Reviewed 12/4/24  Ongoing by medical  No associated orders from this encounter found during lookback period of 72 hours.    Tobacco abuse  Reviewed 12/4/24  Continue to encourage cessation upon discharge  No associated orders from this encounter found during lookback period of 72 hours.  Elevated hemoglobin A1c  Reviewed 12/4/24  Follows with medical  No associated orders from this encounter found during lookback period of 72 hours.  Class 2 obesity in adult  Reviewed 12/4/24  Follows with medical  No associated orders from this encounter found during lookback period of 72  hours.    Primary hypertension  Reviewed 12/4/24  Follows with medical  No associated orders from this encounter found during lookback period of 72 hours.    Psychiatry Progress Note [facility]    Progress towards goals: Progressing    Review of systems: Unremarkable    Psychiatric Diagnosis: Schizoaffective disorder, bipolar type    Assessment  Overall Status:  Status quo- continues to feel anxious and depressed regarding voices but is trying to find ways to cope with them  Certification Statement: The patient will continue to require additional inpatient hospital stay due to schizoaffective disorder, bipolar type and related symptoms        Medications: as above  Side effects from treatment: Denied  Medication changes: as above  Medication education: Risks side effects benefits and precautions of medications discussed with patient and they did verbalize an understanding about risks for metabolic syndrome from being on neuroleptics and tardive dyskinesia etc.  All medications reviewed and I recommend that they be continued for symptom management  Understanding of medications: Risks side effects benefits and precautions of medications discussed with patient and they did verbalize an understanding about risks for metabolic syndrome from being on neuroleptics and tardive dyskinesia etc., and patient appeared to have understanding.  Justification for dual anti-psychotics: Due to failure on monotherapy    Non-pharmacological treatments  Continue with individual, group, milieu and occupational therapy using recovery principles and psycho-education about accepting illness and the need for treatment.  Behavioral health checks every 7 minutes    Safety  Safety and communication plan established to target dynamic risk factors discussed above.    Discharge Plan   To live with aunt once [facility] approves funding on ACT team    Interval Progress: Per treatment team, patient is compliant with meals and  "medications. He is pleasant, cooperative, and social with peers on unit. He did not require any PRN psychotropic medications yesterday or this morning. He attended 9/11 groups yesterday.    Today, patient evaluated by this writer and Dr. Rosario in [facility]. He states his mood is \"depressed and tired\". He states he is anxious and depressed due to the constant voices he hears saying they are going to kill him and his family. He rates his depression as 6/10 this morning. He rates his anxiety as 6/10 this morning. He states the voices have been constant for the past 2 or 3 years but he is trying to find ways to cope with the voices by working out, listening to music, and talking with peers. He denies visual hallucinations. He reports persecutory delusions regarding the threatening voices he is hearing but states he feels safe here on the unit. He states he \"hopes\" his family is safe and that talking to them on the phone daily helps reassure him. He denies any grandiose, somatic, or bizarre delusions. He reports feelings of depression, anxiety, racing thoughts, low energy and low self-esteem. He states his motivation today is \"decent\" and that he has mood swings \"sometimes but rarely\". He reports passive death wishes \"sometimes\" but denies having them currently. He denies thoughts of harm to self or others, SI, or HI. He states he slept \"well\" last night and has a good appetite. He states he is trying to eat healthier and work out to lose weight. He denies any medication side effects.    Acceptance by patient: Accepting of medications and treatments  Hopefulness in recovery: Hopeful about living with aunt upon discharge  Involved in reintegration process: Patient communication with his aunt and sister on phone- had visit in person on unit with aunt and cousin last week.  Trusting in relationship with psychiatrist: Present  Sleep: Good  Appetite: Good  Compliance with Medications: Compliant  Group attendance: 9/11 groups " "yesterday  Significant events: None in the past 24 hours    Mental Status Exam  Appearance: age appropriate, casually dressed, dressed appropriately  Behavior: cooperative, calm, fair  eye contact  Speech: normal rate, normal pitch, decreased volume, not pressured  Mood: \"depressed and tired\"  Affect: constricted, mood-congruent  Thought Process: organized, logical, coherent, goal directed, linear  Thought Content: Mild paranoia regarding threatening voices he hears   Perceptual Disturbances: Auditory hallucinations of voices saying they are going to kill him and his family. Denies visual hallucinations when asked. Does not appear to be responding to internal stimuli  Sensorium: alert and oriented to person, place, time and situation  Cognition: recent and remote memory grossly intact  Consciousness: alert and awake  Attention: attention span and concentration are age appropriate  Intellect: appears to be of average intelligence  Insight: fair  Judgement: fair  Motor Activity: no abnormal movements     Vitals  Temp:  [97.5 °F (36.4 °C)-98.1 °F (36.7 °C)] 98.1 °F (36.7 °C)  HR:  [78-91] 78  Resp:  [17-18] 17  BP: (114-122)/(58-60) 122/59  SpO2:  [98 %-100 %] 100 %  No intake or output data in the 24 hours ending 12/04/24 1055    Lab Results: All Labs For Current Hospital Admission Reviewed        Current Facility-Administered Medications   Medication Dose Route Frequency Provider Last Rate    acetaminophen  650 mg Oral Q4H PRN Jordan C Holter, DO      acetaminophen  650 mg Oral Q6H PRN HOLLI Lion      aluminum-magnesium hydroxide-simethicone  30 mL Oral Q4H PRN Jordan C Holter, DO      amLODIPine  5 mg Oral Daily HOLLI Lion      ARIPiprazole  30 mg Oral Daily Bora Rosario MD      Artificial Tears  1 drop Both Eyes Q3H PRN Jordan C Holter, DO      atropine  1 drop Sublingual  Harjeet Torres DO      haloperidol lactate  2.5 mg Intramuscular Q4H PRN Max 4/day HOLLI Lion      And    LORazepam "  1 mg Intramuscular Q4H PRN Max 4/day HOLLI Lion      And    benztropine  0.5 mg Intramuscular Q4H PRN Max 4/day HOLLI Lion      benztropine  1 mg Intramuscular Q4H PRN Max 6/day Jordan C Holter, DO      haloperidol lactate  5 mg Intramuscular Q4H PRN Max 4/day HOLLI Lion      And    LORazepam  2 mg Intramuscular Q4H PRN Max 4/day HOLLI Lion      And    benztropine  1 mg Intramuscular Q4H PRN Max 4/day HOLLI Lion      benztropine  1 mg Oral Q4H PRN Max 6/day HOLLI Lion      benztropine  1 mg Oral Q4H PRN Max 6/day Jordan C Holter, DO      bisacodyl  10 mg Rectal Daily PRN HOLLI Lion      calcium carbonate  500 mg Oral BID PRN HOLLI Monge      cloZAPine  225 mg Oral HS Hardik So MD      hydrOXYzine HCL  50 mg Oral Q6H PRN Max 4/day Jordan C Holter, DO      Or    diphenhydrAMINE  50 mg Intramuscular Q6H PRN Jordan C Holter, DO      hydrOXYzine HCL  50 mg Oral Q6H PRN Max 4/day HOLLI Lion      Or    diphenhydrAMINE  50 mg Intramuscular Q6H PRN HOLLI Lion      diphenhydrAMINE-zinc acetate   Topical BID PRN HOLLI Lion      docusate sodium  100 mg Oral BID Jourdan Dickens,       escitalopram  20 mg Oral Daily HOLLI Lion      famotidine  20 mg Oral BID PRN HOLLI Monge      fluticasone  1 spray Each Nare Daily PRN HOLLI Monge      glycopyrrolate  1 mg Oral BID (AM & Afternoon) Bora Rosario MD      glycopyrrolate  2 mg Oral HS Bora Rosario MD      haloperidol  1 mg Oral Q6H PRN HOLLI Lion      haloperidol  2.5 mg Oral Q4H PRN Max 4/day HOLLI Lion      haloperidol  5 mg Oral Q4H PRN Max 4/day HOLLI Lion      hydroCHLOROthiazide  12.5 mg Oral Daily HOLLI Galvan      hydrocortisone   Topical 4x Daily PRN Eveline Hunt, HOLLI      hydrOXYzine HCL  100 mg Oral Q6H PRN Max 4/day Jordan C Holter, DO      Or    LORazepam  2 mg  Intramuscular Q6H PRN [facility] Holter, DO      hydrOXYzine HCL  100 mg Oral Q6H PRN Max 4/day HOLLI Lion      Or    LORazepam  2 mg Intramuscular Q6H PRN HOLLI Lion      hydrOXYzine HCL  25 mg Oral Q6H PRN Max 4/day [facility] Holter, DO      ibuprofen  600 mg Oral Q8H PRN HOLLI Lion      influenza vaccine  0.5 mL Intramuscular Once Bora Rosario MD      loratadine  10 mg Oral Daily Brina Guillen MD      magnesium hydroxide  30 mL Oral Once Jourdan Dickens, DO      melatonin  3 mg Oral HS PRN Bora Rosario MD      melatonin  9 mg Oral HS Bora Rosario MD      methocarbamol  500 mg Oral Q6H PRN HOLLI Lion      multivitamin-minerals  1 tablet Oral Daily Eileen CherryHOLLI Jimenez      nicotine polacrilex  2 mg Oral Q4H PRN Bora Rosario MD      OLANZapine  5 mg Oral Q4H PRN Max 3/day [facility] Holter, DO      Or    OLANZapine  2.5 mg Intramuscular Q4H PRN Max 3/day [facility] Holter, DO      OLANZapine  5 mg Oral Q3H PRN Max 3/day [facility] Holter, DO      Or    OLANZapine  5 mg Intramuscular Q3H PRN Max 3/day [facility] Holter, DO      OLANZapine  2.5 mg Oral Q4H PRN Max 6/day [facility] Holter, DO      ondansetron  4 mg Oral Q6H PRN HOLLI Lion      pantoprazole  [facility]      polyethylene glycol  17 g Oral Daily PRN HOLLI Ghotra      polyethylene glycol  17 g Oral Daily Jourdan Dickens, DO      propranolol  10 mg Oral Q12H KAYLIE HOLLI Lion      senna-docusate sodium  1 tablet Oral Daily PRN [facility] Cresencioter, DO      senna-docusate sodium  2 tablet Oral HS Jourdan Dickens, DO      traZODone  50 mg Oral HS PRN HOLLI Lion      white petrolatum-mineral oil   Topical TID PRN HOLLI Lion         Counseling / Coordination of Care: Total floor / unit time spent today 15 minutes. Greater than 50% of total time was spent with the patient and / or family counseling and / or somewhat receptive to supportive listening and teaching positive  coping skills to deal with symptom mangement.     Patient's Rights, confidentiality and exceptions to confidentiality, use of automated medical record, Behavioral Health Services staff access to medical record, and consent to treatment reviewed.    This note has been dictated and hence there may be problems with punctuation, spelling and formatting and if anyone has any concerns please address them to Dr. Mora.  This note is not shared with patient due to potential for making patient's condition worse by knowing the content of the note.    VERONICA FloresS

## 2024-12-04 NOTE — ASSESSMENT & PLAN NOTE
Reviewed 12/4/24  Ongoing by medical  No associated orders from this encounter found during lookback period of 72 hours.

## 2024-12-04 NOTE — ASSESSMENT & PLAN NOTE
"  Reviewed on 12/4/2024.    Patient reports feelings of depression and anxiety, racing thoughts, hopelessness, low self-esteem, and low energy. He states he experiences mood swings \"sometimes but rarely\". He states his motivation today is \"decent\" today. He states he slept \"well\" last night. He states his mood is \"depressed and tired\". He rates his depression as 6/10 this morning. He states he is depressed and anxious due to the voices in his head constantly telling him they are going to kill him and his family and that he is going to hell. He states his anxiety is 6/10 this morning. He states the voices are random people, some friends voices he recognizes, and never his own voice. He states he has been hearing the voices constantly for the past 2-3 years. He states the voices have not changed or said anything new recently. He states working out using the punching bag, listening to music and walking/talking with peers helps him cope with the voices. He states he did not use the punching bag yesterday due to being too \"full\" from lunch but he plans to use it today. He denies any visual hallucinations. He reports persecutory delusions regarding the threatening voices he is hearing but states he feels safe here. He is worried about the safety of his family but states speaking to them on the phone daily reassures him they are safe. He denies grandiose, somatic or bizarre delusions. He reports passive death wishes \"sometimes\" but denies having them currently. He denies thoughts to harm self or others, SI, or HI.     Continue Clozapine 225 mg daily at bedtime for psychosis -- to consider further careful titration for Sxs depending on response  Continue to obtain CBC with diff, CRP, and CK (weekly on Tues), and a Clozapine level and BNP (every 2 weeks on Tues). ANC 4.71 11/26/24    Continue Abilify 30 mg once daily for psychosis  Continue Escitalopram 20 mg once daily for depression and anxiety  Continue Senna-Docusate " sodium one 50 mg tablet daily before bed for constipation  - decreased from two tablets daily on 11/3 due to reported loose stools  Continue propanolol 10 mg twice daily for anxiety   Continue amlodipine 5 mg PO daily     Pt remains on dual antipsychotic Tx due to failure on monotherapy      ACT team referral was made  No associated orders from this encounter found during lookback period of 72 hours.

## 2024-12-04 NOTE — PLAN OF CARE
Problem: Ineffective Coping  Goal: Identifies ineffective coping skills  Outcome: Progressing  Goal: Identifies healthy coping skills  Outcome: Progressing  Goal: Demonstrates healthy coping skills  Outcome: Progressing  Goal: Participates in unit activities  Description: Interventions:  - Provide therapeutic environment   - Provide required programming   - Redirect inappropriate behaviors   Outcome: Progressing    He continues to progress towards the goals by attending and participating in the groups.

## 2024-12-04 NOTE — CASE MANAGEMENT
Spoke to patient who asked for virtual family visit with sister today at 1630. Spoke to attending RN who is in support of client's family visit. Virtual visit set up on [employer] 1 tablet for 1630. Patient informed

## 2024-12-04 NOTE — ASSESSMENT & PLAN NOTE
Reviewed 12/4/24  Continue to encourage cessation upon discharge  No associated orders from this encounter found during lookback period of 72 hours.

## 2024-12-04 NOTE — ASSESSMENT & PLAN NOTE
Reviewed 12/4/24  Follows with medical  Continue famotidine 20 mg tablet BID per medical team   Continue glycopyrrolate 1 mg tablet BID AM and afternoon per medical team  Continue glycopyrrolate 2 mg tablet before bed per medical team  Continue pantoprazole EC 20 mg tablet every morning per medical team  No associated orders from this encounter found during lookback period of 72 hours.

## 2024-12-04 NOTE — ASSESSMENT & PLAN NOTE
Reviewed 12/4/24  Follows with medical  Continue senna-docusate sodium dose per medical team.   No associated orders from this encounter found during lookback period of 72 hours.

## 2024-12-04 NOTE — PLAN OF CARE
Problem: Alteration in Thoughts and Perception  Goal: Treatment Goal: Gain control of psychotic behaviors/thinking, reduce/eliminate presenting symptoms and demonstrate improved reality functioning upon discharge  Outcome: Progressing  Goal: Refrain from acting on delusional thinking/internal stimuli  Description: Interventions:  - Monitor patient closely, per order   - Utilize least restrictive measures   - Set reasonable limits, give positive feedback for acceptable   - Administer medications as ordered and monitor of potential side effects  Outcome: Progressing  Goal: Agree to be compliant with medication regime, as prescribed and report medication side effects  Description: Interventions:  - Offer appropriate PRN medication and supervise ingestion; conduct AIMS, as needed   Outcome: Progressing  Goal: Attend and participate in unit activities, including therapeutic, recreational, and educational groups  Description: Interventions:  -Encourage Visitation and family involvement in care  Outcome: Progressing  Goal: Recognize dysfunctional thoughts, communicate reality-based thoughts at the time of discharge  Description: Interventions:  - Provide medication and psycho-education to assist patient in compliance and developing insight into his/her illness   Outcome: Progressing  Goal: Complete daily ADLs, including personal hygiene independently, as able  Description: Interventions:  - Observe, teach, and assist patient with ADLS  - Monitor and promote a balance of rest/activity, with adequate nutrition and elimination   Outcome: Progressing     Problem: Ineffective Coping  Goal: Demonstrates healthy coping skills  Outcome: Progressing  Goal: Participates in unit activities  Description: Interventions:  - Provide therapeutic environment   - Provide required programming   - Redirect inappropriate behaviors   Outcome: Progressing  Goal: Patient/Family participate in treatment and DC plans  Description:  Interventions:  - Provide therapeutic environment  Outcome: Progressing     Problem: Depression  Goal: Verbalize thoughts and feelings  Description: Interventions:  - Assess and re-assess patient's level of risk   - Engage patient in 1:1 interactions, daily, for a minimum of 15 minutes   - Encourage patient to express feelings, fears, frustrations, hopes   Outcome: Progressing  Goal: Refrain from harming self  Description: Interventions:  - Monitor patient closely, per order   - Supervise medication ingestion, monitor effects and side effects   Outcome: Progressing  Goal: Refrain from isolation  Description: Interventions:  - Develop a trusting relationship   - Encourage socialization   Outcome: Progressing  Goal: Refrain from self-neglect  Outcome: Progressing     Problem: Anxiety  Goal: Anxiety is at manageable level  Description: Interventions:  - Assess and monitor patient's anxiety level.   - Monitor for signs and symptoms (heart palpitations, chest pain, shortness of breath, headaches, nausea, feeling jumpy, restlessness, irritable, apprehensive).   - Collaborate with interdisciplinary team and initiate plan and interventions as ordered.  - [place] patient to unit/surroundings  - Explain treatment plan  - Encourage participation in care  - Encourage verbalization of concerns/fears  - Identify coping mechanisms  - Assist in developing anxiety-reducing skills  - Administer/offer alternative therapies  - Limit or eliminate stimulants  Outcome: Progressing

## 2024-12-04 NOTE — NURSING NOTE
Pt is visible on the unit and social with select peers. Consumed 100% of dinner. Took medications without incidence. Pt is pleasant and cooperative. Attended 9/11 groups. Reports continued depression and AH that threaten to kill him and his family but reports he feels safe on the unit. Denies SI/HI/VH. No behavioral issues. Pt offers no complaints.

## 2024-12-04 NOTE — PROGRESS NOTES
12/04/24 0757   Team Meeting   Meeting Type Daily Rounds   Team Members Present   Team Members Present Physician;Nurse;;Other (Discipline and Name)   Physician Team Member Thomas, Holter   Nursing Team Member Claudia   Social Work Team Member Jose J ORTEGA   Patient/Family Present   Patient Present No   Patient's Family Present No     Groups Participation  9/11.   Patient's compliant with medications. He's engaged in his treatment. He uses his coping skills to manage his AH and depression.

## 2024-12-05 PROCEDURE — 99232 SBSQ HOSP IP/OBS MODERATE 35: CPT | Performed by: PSYCHIATRY & NEUROLOGY

## 2024-12-05 RX ADMIN — ATROPINE SULFATE 1 DROP: 10 SOLUTION/ DROPS OPHTHALMIC at 21:17

## 2024-12-05 RX ADMIN — GLYCOPYRROLATE 1 MG: 1 TABLET ORAL at 09:09

## 2024-12-05 RX ADMIN — LORATADINE 10 MG: 10 TABLET ORAL at 09:08

## 2024-12-05 RX ADMIN — NICOTINE POLACRILEX 2 MG: 2 GUM, CHEWING ORAL at 17:08

## 2024-12-05 RX ADMIN — MELATONIN TAB 3 MG 9 MG: 3 TAB at 21:18

## 2024-12-05 RX ADMIN — PANTOPRAZOLE SODIUM 20 MG: 20 TABLET, DELAYED RELEASE ORAL at 09:09

## 2024-12-05 RX ADMIN — PROPRANOLOL HYDROCHLORIDE 10 MG: 10 TABLET ORAL at 09:09

## 2024-12-05 RX ADMIN — ARIPIPRAZOLE 30 MG: 15 TABLET ORAL at 09:08

## 2024-12-05 RX ADMIN — DOCUSATE SODIUM 100 MG: 100 CAPSULE, LIQUID FILLED ORAL at 17:08

## 2024-12-05 RX ADMIN — DOCUSATE SODIUM 100 MG: 100 CAPSULE, LIQUID FILLED ORAL at 09:09

## 2024-12-05 RX ADMIN — GLYCOPYRROLATE 1 MG: 1 TABLET ORAL at 14:15

## 2024-12-05 RX ADMIN — GLYCOPYRROLATE 2 MG: 1 TABLET ORAL at 21:18

## 2024-12-05 RX ADMIN — CLOZAPINE 225 MG: 25 TABLET ORAL at 21:18

## 2024-12-05 RX ADMIN — NICOTINE POLACRILEX 2 MG: 2 GUM, CHEWING ORAL at 09:09

## 2024-12-05 RX ADMIN — SENNOSIDES AND DOCUSATE SODIUM 2 TABLET: 50; 8.6 TABLET ORAL at 21:18

## 2024-12-05 RX ADMIN — ESCITALOPRAM OXALATE 20 MG: 10 TABLET, FILM COATED ORAL at 09:08

## 2024-12-05 RX ADMIN — NICOTINE POLACRILEX 2 MG: 2 GUM, CHEWING ORAL at 21:19

## 2024-12-05 RX ADMIN — HYDROCHLOROTHIAZIDE 12.5 MG: 12.5 TABLET ORAL at 09:08

## 2024-12-05 RX ADMIN — AMLODIPINE BESYLATE 5 MG: 5 TABLET ORAL at 09:08

## 2024-12-05 RX ADMIN — MULTIPLE VITAMINS W/ MINERALS TAB 1 TABLET: TAB ORAL at 09:09

## 2024-12-05 RX ADMIN — PROPRANOLOL HYDROCHLORIDE 10 MG: 10 TABLET ORAL at 21:19

## 2024-12-05 NOTE — ASSESSMENT & PLAN NOTE
"  Reviewed on 12/5/2024.    Patient reports feelings of depression and anxiety, racing thoughts, hopelessness, low self-esteem, and low energy. He states he experiences mood swings \"sometimes but rarely\", but states he had mood swing last night. He states his motivation today is \"medium\" today. He states he slept \"well\" last night. He states his mood is \"tired and depressed\". He rates his depression as 6/10 this morning. He states he is depressed and anxious due to the voices in his head constantly telling him they are going to kill him and his family and that he is going to hell. He states his anxiety is 4/10 this morning which is improved from yesterday. He states the voices are random people, some friends voices he recognizes, and never his own voice. He states he has been hearing the voices constantly for the past 2-3 years. He states the voices have not changed or said anything new recently. He states working out using the punching bag, listening to music and talking with peers helps him cope with the voices. He states he used the punching bag yesterday and he plans to use it today. He denies any visual hallucinations. He reports persecutory delusions regarding the threatening voices he is hearing but states he feels safe here. He states he believes his family is safe. He denies grandiose, somatic or bizarre delusions. He reports passive death wishes \"sometimes\" but denies having them currently. He denies thoughts to harm self or others, SI, or HI.     Continue Clozapine 225 mg daily at bedtime for psychosis -- to consider further careful titration for Sxs depending on response  Continue to obtain CBC with diff, CRP, and CK (weekly on Tues), and a Clozapine level and BNP (every 2 weeks on Tues). ANC 4.71 11/26/24    Continue Abilify 30 mg once daily for psychosis  Continue Escitalopram 20 mg once daily for depression and anxiety  Continue Senna-Docusate sodium one 50 mg tablet daily before bed for " constipation  - decreased from two tablets daily on 11/3 due to reported loose stools  Continue propanolol 10 mg twice daily for anxiety   Continue amlodipine 5 mg PO daily     Pt remains on dual antipsychotic Tx due to failure on monotherapy      ACT team referral was made  No associated orders from this encounter found during lookback period of 72 hours.

## 2024-12-05 NOTE — NURSING NOTE
Alberto has been visible in the milieu, but mostly in the dining room with male peers. Pleasant and cooperative upon approach. Admits to anxiety, depression and auditory hallucinations. Attended groups tonight. Ate snack and took HS medication without difficulty. No issues or behaviors. Continue to monitor. Precautions maintained.

## 2024-12-05 NOTE — PLAN OF CARE
Problem: Alteration in Thoughts and Perception  Goal: Treatment Goal: Gain control of psychotic behaviors/thinking, reduce/eliminate presenting symptoms and demonstrate improved reality functioning upon discharge  Outcome: Progressing  Goal: Refrain from acting on delusional thinking/internal stimuli  Description: Interventions:  - Monitor patient closely, per order   - Utilize least restrictive measures   - Set reasonable limits, give positive feedback for acceptable   - Administer medications as ordered and monitor of potential side effects  Outcome: Progressing  Goal: Agree to be compliant with medication regime, as prescribed and report medication side effects  Description: Interventions:  - Offer appropriate PRN medication and supervise ingestion; conduct AIMS, as needed   Outcome: Progressing  Goal: Attend and participate in unit activities, including therapeutic, recreational, and educational groups  Description: Interventions:  -Encourage Visitation and family involvement in care  Outcome: Progressing  Goal: Complete daily ADLs, including personal hygiene independently, as able  Description: Interventions:  - Observe, teach, and assist patient with ADLS  - Monitor and promote a balance of rest/activity, with adequate nutrition and elimination   Outcome: Progressing     Problem: Ineffective Coping  Goal: Patient/Family verbalizes awareness of resources  Outcome: Progressing     Problem: Depression  Goal: Treatment Goal: Demonstrate behavioral control of depressive symptoms, verbalize feelings of improved mood/affect, and adopt new coping skills prior to discharge  Outcome: Progressing  Goal: Verbalize thoughts and feelings  Description: Interventions:  - Assess and re-assess patient's level of risk   - Engage patient in 1:1 interactions, daily, for a minimum of 15 minutes   - Encourage patient to express feelings, fears, frustrations, hopes   Outcome: Progressing  Goal: Refrain from harming  self  Description: Interventions:  - Monitor patient closely, per order   - Supervise medication ingestion, monitor effects and side effects   Outcome: Progressing  Goal: Refrain from isolation  Description: Interventions:  - Develop a trusting relationship   - Encourage socialization   Outcome: Progressing  Goal: Refrain from self-neglect  Outcome: Progressing  Goal: Attend and participate in unit activities, including therapeutic, recreational, and educational groups  Description: Interventions:  - Provide therapeutic and educational activities daily, encourage attendance and participation, and document same in the medical record   Outcome: Progressing     Problem: Anxiety  Goal: Anxiety is at manageable level  Description: Interventions:  - Assess and monitor patient's anxiety level.   - Monitor for signs and symptoms (heart palpitations, chest pain, shortness of breath, headaches, nausea, feeling jumpy, restlessness, irritable, apprehensive).   - Collaborate with interdisciplinary team and initiate plan and interventions as ordered.  - [place] patient to unit/surroundings  - Explain treatment plan  - Encourage participation in care  - Encourage verbalization of concerns/fears  - Identify coping mechanisms  - Assist in developing anxiety-reducing skills  - Administer/offer alternative therapies  - Limit or eliminate stimulants  Outcome: Progressing     Problem: Alteration in Orientation  Goal: Interact with staff daily  Description: Interventions:  - Assess and re-assess patient's level of orientation  - Engage patient in 1 on 1 interactions, daily, for a minimum of 15 minutes   - Establish rapport/trust with patient   Outcome: Progressing  Goal: Express concerns related to confused thinking related to:  Description: Interventions:  - Encourage patient to express feelings, fears, frustrations, hopes  - Assign consistent caregivers   - [place]/re-orient patient as needed  - Allow comfort items, as appropriate  -  Provide visual cues, signs, etc.   Outcome: Progressing  Goal: Allow medical examinations, as recommended  Description: Interventions:  - Provide physical/neurological exams and/or referrals, per provider   Outcome: Progressing  Goal: Cooperate with recommended testing/procedures  Description: Interventions:  - Determine need for ancillary testing  - Observe for mental status changes  - Implement falls/precaution protocol   Outcome: Progressing  Goal: Complete daily ADLs, including personal hygiene independently, as able  Description: Interventions:  - Observe, teach, and assist patient with ADLS  Outcome: Progressing     Problem: DISCHARGE PLANNING - CARE MANAGEMENT  Goal: Discharge to post-acute care or home with appropriate resources  Description: INTERVENTIONS:  - Conduct assessment to determine patient/family and health care team treatment goals, and need for post-acute services based on payer coverage, community resources, and patient preferences, and barriers to discharge  - Address psychosocial, clinical, and financial barriers to discharge as identified in assessment in conjunction with the patient/family and health care team  - Arrange appropriate level of post-acute services according to patient's   needs and preference and payer coverage in collaboration with the physician and health care team  - Communicate with and update the patient/family, physician, and health care team regarding progress on the discharge plan  - Arrange appropriate transportation to post-acute venues  Outcome: Progressing

## 2024-12-05 NOTE — ASSESSMENT & PLAN NOTE
Reviewed 12/5/24  Follows with medical  No associated orders from this encounter found during lookback period of 72 hours.

## 2024-12-05 NOTE — ASSESSMENT & PLAN NOTE
Reviewed 12/5/24  Continue to encourage cessation upon discharge  No associated orders from this encounter found during lookback period of 72 hours.

## 2024-12-05 NOTE — SOCIAL WORK
Patient completed his 's Licensee renewal. This writer spoke with sister Ginger and requested a money order payable to [employer] for $39.50.  will bring money order on Saturday and visit with patient at 1100. This writer provided link to participate in his Treatment team meeting on Monday.

## 2024-12-05 NOTE — ASSESSMENT & PLAN NOTE
Reviewed 12/5/24  Ongoing by medical  No associated orders from this encounter found during lookback period of 72 hours.

## 2024-12-05 NOTE — NURSING NOTE
"Pt is accepting of medications without incidence, but refused miralax. Pt is meal compliant and attends select groups. Pt reports anxiety and depression, and \"the same AH\". Pt is social with select peers and staff. Offers no new concerns otherwise.   " Statement Selected

## 2024-12-05 NOTE — ASSESSMENT & PLAN NOTE
Reviewed 12/5/24  Follows with medical  Continue famotidine 20 mg tablet BID per medical team   Continue glycopyrrolate 1 mg tablet BID AM and afternoon per medical team  Continue glycopyrrolate 2 mg tablet before bed per medical team  Continue pantoprazole EC 20 mg tablet every morning per medical team  No associated orders from this encounter found during lookback period of 72 hours.

## 2024-12-05 NOTE — PROGRESS NOTES
12/05/24 0824   Team Meeting   Meeting Type Daily Rounds   Team Members Present   Team Members Present Physician;Nurse;;Other (Discipline and Name)   Physician Team Member Thomas, Holter   Nursing Team Member Claudia   Social Work Team Member Jose J ORTEGA   Patient/Family Present   Patient Present No   Patient's Family Present No     Groups Participation  7/10.   Patient's compliant with medications. He's appropriate and engaged in his treatment. He socializes with his peers.

## 2024-12-05 NOTE — ASSESSMENT & PLAN NOTE
Reviewed 12/5/24  Follows with medical  Continue senna-docusate sodium dose per medical team.   No associated orders from this encounter found during lookback period of 72 hours.

## 2024-12-05 NOTE — PROGRESS NOTES
"Progress Note - Behavioral Health   Name: Alberto Berumen 29 y.o. male I MRN: [id number]  Unit/Bed#: [facility] 112-02 I Date of Admission: 3/29/2024   Date of Service: 12/5/2024 I Hospital Day: 251     Assessment & Plan  Schizoaffective disorder, bipolar type (HCC)    Reviewed on 12/5/2024.    Patient reports feelings of depression and anxiety, racing thoughts, hopelessness, low self-esteem, and low energy. He states he experiences mood swings \"sometimes but rarely\", but states he had mood swing last night. He states his motivation today is \"medium\" today. He states he slept \"well\" last night. He states his mood is \"tired and depressed\". He rates his depression as 6/10 this morning. He states he is depressed and anxious due to the voices in his head constantly telling him they are going to kill him and his family and that he is going to hell. He states his anxiety is 4/10 this morning which is improved from yesterday. He states the voices are random people, some friends voices he recognizes, and never his own voice. He states he has been hearing the voices constantly for the past 2-3 years. He states the voices have not changed or said anything new recently. He states working out using the punching bag, listening to music and talking with peers helps him cope with the voices. He states he used the punching bag yesterday and he plans to use it today. He denies any visual hallucinations. He reports persecutory delusions regarding the threatening voices he is hearing but states he feels safe here. He states he believes his family is safe. He denies grandiose, somatic or bizarre delusions. He reports passive death wishes \"sometimes\" but denies having them currently. He denies thoughts to harm self or others, SI, or HI.     Continue Clozapine 225 mg daily at bedtime for psychosis -- to consider further careful titration for Sxs depending on response  Continue to obtain CBC with diff, CRP, and CK (weekly on Tues), and a " Clozapine level and BNP (every 2 weeks on Tues). ANC 4.71 11/26/24    Continue Abilify 30 mg once daily for psychosis  Continue Escitalopram 20 mg once daily for depression and anxiety  Continue Senna-Docusate sodium one 50 mg tablet daily before bed for constipation  - decreased from two tablets daily on 11/3 due to reported loose stools  Continue propanolol 10 mg twice daily for anxiety   Continue amlodipine 5 mg PO daily     Pt remains on dual antipsychotic Tx due to failure on monotherapy      ACT team referral was made  No associated orders from this encounter found during lookback period of 72 hours.    Chronic idiopathic constipation  Reviewed 12/5/24  Follows with medical  Continue senna-docusate sodium dose per medical team.   No associated orders from this encounter found during lookback period of 72 hours.  GERD (gastroesophageal reflux disease)  Reviewed 12/5/24  Follows with medical  Continue famotidine 20 mg tablet BID per medical team   Continue glycopyrrolate 1 mg tablet BID AM and afternoon per medical team  Continue glycopyrrolate 2 mg tablet before bed per medical team  Continue pantoprazole EC 20 mg tablet every morning per medical team  No associated orders from this encounter found during lookback period of 72 hours.  Medical clearance for psychiatric admission  Reviewed 12/5/24  Ongoing by medical  No associated orders from this encounter found during lookback period of 72 hours.    Tobacco abuse  Reviewed 12/5/24  Continue to encourage cessation upon discharge  No associated orders from this encounter found during lookback period of 72 hours.  Elevated hemoglobin A1c  Reviewed 12/5/24  Follows with medical  No associated orders from this encounter found during lookback period of 72 hours.  Class 2 obesity in adult  Reviewed 12/5/24  Follows with medical  No associated orders from this encounter found during lookback period of 72 hours.    Primary hypertension  Reviewed 12/5/24  Follows with  medical  No associated orders from this encounter found during lookback period of 72 hours.    Psychiatry Progress Note [facility]    Progress towards goals: Progressing    Review of systems: Unremarkable    Psychiatric Diagnosis: Schizoaffective disorder, bipolar type    Assessment  Overall Status:  Status quo- continues to feel depressed and anxious about voices he hears but is trying to find positive ways to cope with them  Certification Statement: The patient will continue to require additional inpatient hospital stay due to schizoaffective disorder, bipolar type and related symptoms       Medications: as above  Side effects from treatment: Denied  Medication changes: as above  Medication education: Risks side effects benefits and precautions of medications discussed with patient and they did verbalize an understanding about risks for metabolic syndrome from being on neuroleptics and tardive dyskinesia etc.  All medications reviewed and I recommend that they be continued for symptom management  Understanding of medications: Risks side effects benefits and precautions of medications discussed with patient and they did verbalize an understanding about risks for metabolic syndrome from being on neuroleptics and tardive dyskinesia etc., and patient appeared to have understanding.  Justification for dual anti-psychotics: Due to failure on monotherapy    Non-pharmacological treatments  Continue with individual, group, milieu and occupational therapy using recovery principles and psycho-education about accepting illness and the need for treatment.  Behavioral health checks every 7 minutes    Safety  Safety and communication plan established to target dynamic risk factors discussed above.    Discharge Plan   To live with aunt once [facility] approves funding on ACT team    Interval Progress: Per treatment team, patient is compliant with meals and medications. He is pleasant, cooperative, visible, and  "social with peers on unit. He did not require any PRN psychotropic medications yesterday or this morning. He had a virtual visit with his sister yesterday that went well. He attended 7/10 groups yesterday.    Today, patient evaluated by this writer and Dr. Rosario in [facility]. He states his mood is \"tired and depressed\". He states he is depressed and anxious regarding the constant voices he hears telling him they are going to kill him and his family and that he is going to hell. He rates his depression as 6/10 this morning. He rates his anxiety as 4/10 this morning which is slightly improved from yesterday. He states the voices have been constant for the past 2-3 years and he is trying to find ways to cope with the voices. He states working out using the punching bag, listening to music, and talking with peers helps him cope with the voices. He denies any visual hallucinations. He reports persecutory delusions regarding the threatening voices but states he feels safe her and believes his family is safe. He denies any grandiose, somatic, or bizarre delusions. He reports feelings of depression, anxiety, hopelessness, racing thoughts, low energy, and low self-esteem. He states his motivation today is \"medium\". He states he has mood swings \"sometimes but rarely\" but states he had a mood swing last night where he was happy and then sad. He reports passive death wishes \"sometimes\" but denies any currently. He denies any thoughts to harm self or others, SI or HI. He states he slept \"well\" last night. He states he is eating well and has a good appetite but is trying to eat healthier to lose weight. He states he had a virtual visit with his sister yesterday that went well and he talks to her on the phone every day. He denies any medication side effects.    Acceptance by patient: Accepting of medications and treatments  Hopefulness in recovery: Hopeful to live with aunt upon discharge  Involved in reintegration process: Patient " "communication with his aunt and sister on phone- had virtual visit with sister yesterday and in-person visit last week with aunt and cousin on unit.  Trusting in relationship with psychiatrist: Present  Sleep: Good  Appetite: Good  Compliance with Medications: Compliant  Group attendance: 7/10 groups yesterday  Significant events: None in the past 24 hours    Mental Status Exam  Appearance: age appropriate, casually dressed, dressed appropriately  Behavior: cooperative, calm, fair  eye contact  Speech: normal rate, normal volume, normal pitch, not pressured  Mood: \"tired and depressed\"  Affect: constricted, mood-congruent  Thought Process: organized, logical, coherent, goal directed, linear  Thought Content: Mild paranoia regarding threatening voices he hears  Perceptual Disturbances: Auditory hallucinations of voices constantly saying they are going to kill him and his family and that he is going to hell. Denies visual hallucinations when asked. Does not appear to be responding to internal stimuli  Sensorium: alert and oriented to person, place, time and situation  Cognition: recent and remote memory grossly intact  Consciousness: alert and awake  Attention: attention span and concentration are age appropriate  Intellect: appears to be of average intelligence  Insight: fair  Judgement: fair  Motor Activity: no abnormal movements     Vitals  Temp:  [97.5 °F (36.4 °C)-97.8 °F (36.6 °C)] 97.8 °F (36.6 °C)  HR:  [85-91] 85  Resp:  [18] 18  BP: (128-134)/(63-76) 128/75  SpO2:  [98 %] 98 %  No intake or output data in the 24 hours ending 12/05/24 1030    Lab Results: All Labs For Current Hospital Admission Reviewed        Current Facility-Administered Medications   Medication Dose Route Frequency Provider Last Rate    acetaminophen  650 mg Oral Q4H PRN Jordan C Holter, DO      acetaminophen  650 mg Oral Q6H PRN HOLLI Lion      aluminum-magnesium hydroxide-simethicone  30 mL Oral Q4H PRN Jordan C Holter, DO      " amLODIPine  5 mg Oral Daily HOLLI Lion      ARIPiprazole  30 mg Oral Daily Bora Rosario MD      Artificial Tears  1 drop Both Eyes Q3H PRN Jordan C Holter, DO      atropine  1 drop Sublingual HS Harjeet Torres DO      haloperidol lactate  2.5 mg Intramuscular Q4H PRN Max 4/day HOLLI Lion      And    LORazepam  1 mg Intramuscular Q4H PRN Max 4/day STEVE LionNP      And    benztropine  0.5 mg Intramuscular Q4H PRN Max 4/day STEVE LionNP      benztropine  1 mg Intramuscular Q4H PRN Max 6/day Jordan C Holter, DO      haloperidol lactate  5 mg Intramuscular Q4H PRN Max 4/day HOLLI Lino      And    LORazepam  2 mg Intramuscular Q4H PRN Max 4/day HOLLI Lion      And    benztropine  1 mg Intramuscular Q4H PRN Max 4/day HOLLI Lion      benztropine  1 mg Oral Q4H PRN Max 6/day HOLLI Lion      benztropine  1 mg Oral Q4H PRN Max 6/day Jordan C Holter, DO      bisacodyl  10 mg Rectal Daily PRN HOLLI Lion      calcium carbonate  500 mg Oral BID PRN HOLLI Monge      cloZAPine  225 mg Oral HS Hardik So MD      hydrOXYzine HCL  50 mg Oral Q6H PRN Max 4/day Jordan C Holter, DO      Or    diphenhydrAMINE  50 mg Intramuscular Q6H PRN Jordan C Holter, DO      hydrOXYzine HCL  50 mg Oral Q6H PRN Max 4/day HOLLI Lion      Or    diphenhydrAMINE  50 mg Intramuscular Q6H PRN HOLLI Lion      diphenhydrAMINE-zinc acetate   Topical BID PRN HOLLI Lion      docusate sodium  100 mg Oral BID Jourdan Dickens,       escitalopram  20 mg Oral Daily HOLLI Lion      famotidine  20 mg Oral BID PRN HOLLI Monge      fluticasone  1 spray Each Nare Daily PRN HOLLI Monge      glycopyrrolate  1 mg Oral BID (AM & Afternoon) Bora Rosario MD      glycopyrrolate  2 mg Oral HS Bora Rosario MD      haloperidol  1 mg Oral Q6H PRN HOLLI Lion      haloperidol  2.5 mg Oral Q4H PRN Max  4/day HOLLI Lion      haloperidol  5 mg Oral Q4H PRN Max 4/day HOLLI Lion      hydroCHLOROthiazide  12.5 mg Oral Daily HOLLI Galvan      hydrocortisone   Topical 4x Daily PRN HOLLI Lion      hydrOXYzine HCL  100 mg Oral Q6H PRN Max 4/day [facility] Holter, DO      Or    LORazepam  2 mg Intramuscular Q6H PRN [facility] Holter, DO      hydrOXYzine HCL  100 mg Oral Q6H PRN Max 4/day HOLLI Lion      Or    LORazepam  2 mg Intramuscular Q6H PRN HOLLI Lino      hydrOXYzine HCL  25 mg Oral Q6H PRN Max 4/day [facility] Holter, DO      ibuprofen  600 mg Oral Q8H PRN HOLLI Lion      influenza vaccine  0.5 mL Intramuscular Once Bora Rosario MD      loratadine  10 mg Oral Daily Brina Guillen MD      magnesium hydroxide  30 mL Oral Once Jourdan Dickens, DO      melatonin  3 mg Oral HS PRN Bora Rosario MD      melatonin  9 mg Oral HS Bora Rosario MD      methocarbamol  500 mg Oral Q6H PRN HOLLI Lion      multivitamin-minerals  1 tablet Oral Daily Eileen CherryHOLLI Jimenez      nicotine polacrilex  2 mg Oral Q4H PRN Bora Rosario MD      OLANZapine  5 mg Oral Q4H PRN Max 3/day [facility] Holter, DO      Or    OLANZapine  2.5 mg Intramuscular Q4H PRN Max 3/day [facility] Holter, DO      OLANZapine  5 mg Oral Q3H PRN Max 3/day [facility] Holter, DO      Or    OLANZapine  5 mg Intramuscular Q3H PRN Max 3/day [facility] Holter, DO      OLANZapine  2.5 mg Oral Q4H PRN Max 6/day [facility] Holter, DO      ondansetron  4 mg Oral Q6H PRN HOLLI Lion      pantoprazole  20 mg Oral [facility] Holter, DO      polyethylene glycol  17 g Oral Daily PRN HOLLI Ghotra      polyethylene glycol  17 g Oral Daily Jourdan Dickens, DO      propranolol  10 mg Oral Q12H KAYLIE HOLLI Lion      senna-docusate sodium  1 tablet Oral Daily PRN [facility] Cresencioter, DO      senna-docusate sodium  2 tablet Oral HS Jourdan Dickens, DO      traZODone  50 mg Oral HS PRN STEVE LionNP       white petrolatum-mineral oil   Topical TID PRN HOLLI Lion         Counseling / Coordination of Care: Total floor / unit time spent today 15 minutes. Greater than 50% of total time was spent with the patient and / or family counseling and / or somewhat receptive to supportive listening and teaching positive coping skills to deal with symptom mangement.     Patient's Rights, confidentiality and exceptions to confidentiality, use of automated medical record, Behavioral Health Services staff access to medical record, and consent to treatment reviewed.    This note has been dictated and hence there may be problems with punctuation, spelling and formatting and if anyone has any concerns please address them to Dr. Mora.  This note is not shared with patient due to potential for making patient's condition worse by knowing the content of the note.    VERONICA FloresS

## 2024-12-05 NOTE — NURSING NOTE
Pt is visible on the unit and social with select peers. Consumed 100% of dinner. Took medications without incidence. Pt is pleasant and cooperative. Attended 9/10 groups. Reports anxiety, depression, and continued AH. Pt using the punching bag and exercise room this shift as a coping skill. No behavioral issues. Pt offers no complaints.

## 2024-12-06 PROCEDURE — 99232 SBSQ HOSP IP/OBS MODERATE 35: CPT | Performed by: PSYCHIATRY & NEUROLOGY

## 2024-12-06 RX ADMIN — NICOTINE POLACRILEX 2 MG: 2 GUM, CHEWING ORAL at 21:05

## 2024-12-06 RX ADMIN — NICOTINE POLACRILEX 2 MG: 2 GUM, CHEWING ORAL at 08:52

## 2024-12-06 RX ADMIN — DOCUSATE SODIUM 100 MG: 100 CAPSULE, LIQUID FILLED ORAL at 17:13

## 2024-12-06 RX ADMIN — CLOZAPINE 225 MG: 25 TABLET ORAL at 21:04

## 2024-12-06 RX ADMIN — ARIPIPRAZOLE 30 MG: 15 TABLET ORAL at 08:52

## 2024-12-06 RX ADMIN — NICOTINE POLACRILEX 2 MG: 2 GUM, CHEWING ORAL at 16:15

## 2024-12-06 RX ADMIN — SENNOSIDES AND DOCUSATE SODIUM 2 TABLET: 50; 8.6 TABLET ORAL at 21:04

## 2024-12-06 RX ADMIN — HYDROCHLOROTHIAZIDE 12.5 MG: 12.5 TABLET ORAL at 08:52

## 2024-12-06 RX ADMIN — ATROPINE SULFATE 1 DROP: 10 SOLUTION/ DROPS OPHTHALMIC at 21:04

## 2024-12-06 RX ADMIN — PROPRANOLOL HYDROCHLORIDE 10 MG: 10 TABLET ORAL at 21:04

## 2024-12-06 RX ADMIN — GLYCOPYRROLATE 1 MG: 1 TABLET ORAL at 14:35

## 2024-12-06 RX ADMIN — MELATONIN TAB 3 MG 9 MG: 3 TAB at 21:04

## 2024-12-06 RX ADMIN — LORATADINE 10 MG: 10 TABLET ORAL at 08:52

## 2024-12-06 RX ADMIN — DOCUSATE SODIUM 100 MG: 100 CAPSULE, LIQUID FILLED ORAL at 08:52

## 2024-12-06 RX ADMIN — PROPRANOLOL HYDROCHLORIDE 10 MG: 10 TABLET ORAL at 08:52

## 2024-12-06 RX ADMIN — PANTOPRAZOLE SODIUM 20 MG: 20 TABLET, DELAYED RELEASE ORAL at 08:52

## 2024-12-06 RX ADMIN — AMLODIPINE BESYLATE 5 MG: 5 TABLET ORAL at 08:52

## 2024-12-06 RX ADMIN — GLYCOPYRROLATE 2 MG: 1 TABLET ORAL at 21:04

## 2024-12-06 RX ADMIN — MULTIPLE VITAMINS W/ MINERALS TAB 1 TABLET: TAB ORAL at 08:52

## 2024-12-06 RX ADMIN — GLYCOPYRROLATE 1 MG: 1 TABLET ORAL at 08:52

## 2024-12-06 RX ADMIN — ESCITALOPRAM OXALATE 20 MG: 10 TABLET, FILM COATED ORAL at 08:52

## 2024-12-06 NOTE — PROGRESS NOTES
"Progress Note - Behavioral Health   Name: Alberto Berumen 29 y.o. male I MRN: [id number]  Unit/Bed#: [facility] 112-02 I Date of Admission: 3/29/2024   Date of Service: 12/6/2024 I Hospital Day: 252     Assessment & Plan  Schizoaffective disorder, bipolar type (HCC)    Reviewed on 12/6/2024.    Patient reports feelings of depression and anxiety, racing thoughts, hopelessness, low self-esteem, low motivation, and low energy. He states he experiences mood swings \"sometimes but rarely\", but states he had mood swing the other night but none yesterday or this morning. He states he slept \"well\" last night. He states his mood is \"sad\". He rates his depression as 6/10 this morning. He states he is depressed and anxious due to the voices in his head constantly telling him they are going to kill him and his family and that he is going to hell. He states his anxiety is 4/10 this morning which is slightly improved. He states the voices are random people, some friends voices he recognizes, and never his own voice. He states he has been hearing the voices constantly for the past 2-3 years. He states the voices have not changed or said anything new recently. He states working out using the punching bag, listening to music and talking with peers helps him cope with the voices. He states he used the punching bag yesterday which helped. He denies any visual hallucinations. He reports persecutory delusions regarding the threatening voices he is hearing but states he feels safe here. He states he believes his family is safe and speaking to them on the phone gives him reassurance. He denies grandiose, somatic or bizarre delusions. He reports passive death wishes \"sometimes\" but denies having them currently. He denies thoughts to harm self or others, SI, or HI.     Continue Clozapine 225 mg daily at bedtime for psychosis -- to consider further careful titration for Sxs depending on response  Continue to obtain CBC with diff, CRP, and CK " (weekly on Tues), and a Clozapine level and BNP (every 2 weeks on Tues). ANC 4.71 11/26/24    Continue Abilify 30 mg once daily for psychosis  Continue Escitalopram 20 mg once daily for depression and anxiety  Continue Senna-Docusate sodium one 50 mg tablet daily before bed for constipation  - decreased from two tablets daily on 11/3 due to reported loose stools  Continue propanolol 10 mg twice daily for anxiety   Continue amlodipine 5 mg PO daily     Pt remains on dual antipsychotic Tx due to failure on monotherapy      ACT team referral was made  No associated orders from this encounter found during lookback period of 72 hours.    Chronic idiopathic constipation  Reviewed 12/6/24  Follows with medical  Continue senna-docusate sodium dose per medical team.   No associated orders from this encounter found during lookback period of 72 hours.  GERD (gastroesophageal reflux disease)  Reviewed 12/6/24  Follows with medical  Continue famotidine 20 mg tablet BID per medical team   Continue glycopyrrolate 1 mg tablet BID AM and afternoon per medical team  Continue glycopyrrolate 2 mg tablet before bed per medical team  Continue pantoprazole EC 20 mg tablet every morning per medical team  No associated orders from this encounter found during lookback period of 72 hours.  Medical clearance for psychiatric admission  Reviewed 12/6/24  Ongoing by medical  No associated orders from this encounter found during lookback period of 72 hours.    Tobacco abuse  Reviewed 12/6/24  Continue to encourage cessation upon discharge  No associated orders from this encounter found during lookback period of 72 hours.  Elevated hemoglobin A1c  Reviewed 12/6/24  Follows with medical  No associated orders from this encounter found during lookback period of 72 hours.  Class 2 obesity in adult  Reviewed 12/6/24  Follows with medical  No associated orders from this encounter found during lookback period of 72 hours.    Primary hypertension  Reviewed  12/6/24  Follows with medical  No associated orders from this encounter found during lookback period of 72 hours.    Psychiatry Progress Note [facility]    Progress towards goals: Progressing    Review of systems: Unremarkable    Psychiatric Diagnosis: Schizoaffective disorder, bipolar type    Assessment  Overall Status:  Status quo- continues to feel depressed and anxious regarding the voices he is hearing but finding ways to help cope with them  Certification Statement: The patient will continue to require additional inpatient hospital stay due to schizoaffective disorder and related symptoms       Medications: as above  Side effects from treatment: Denied  Medication changes: as above  Medication education: Risks side effects benefits and precautions of medications discussed with patient and they did verbalize an understanding about risks for metabolic syndrome from being on neuroleptics and tardive dyskinesia etc.  All medications reviewed and I recommend that they be continued for symptom management  Understanding of medications: Risks side effects benefits and precautions of medications discussed with patient and they did verbalize an understanding about risks for metabolic syndrome from being on neuroleptics and tardive dyskinesia etc., and patient appeared to have understanding.  Justification for dual anti-psychotics: Due to failure on monotherapy    Non-pharmacological treatments  Continue with individual, group, milieu and occupational therapy using recovery principles and psycho-education about accepting illness and the need for treatment.  Behavioral health checks every 7 minutes    Safety  Safety and communication plan established to target dynamic risk factors discussed above.    Discharge Plan   To live with aunt once [facility] approves funding on ACT team    Interval Progress: Per treatment team, patient is compliant with meals and medications. He is cooperative, pleasant, and  "visible on unit. He did not require any PRN psychotropic medications yesterday or this morning. He completed his drivers license renewal yesterday. Plan for sister to bring money order for license and visit with patient tomorrow. He used to punching bag yesterday to help cope with voices. He attended 9/10 groups yesterday.    Today, patient evaluated by this writer and Dr. Rosario in [facility]. He states his mood is \"sad\". He states he is depressed and has anxiety about the voices he constantly hears telling him they are going to kill him and his family and that he is going to hell. He rates his depression as 6/10 this morning. He rates his anxiety as 4/10 this morning which is slightly improved from earlier this week. He states the voices have been constant for the past 2-3 years and he has been trying to find positive ways to cope with the voices through using the punching bag, listening to music, and walking/talking with peers. He states he used to punching bag yesterday which helped. He denies any visual hallucinations. He reports persecutory delusions regarding the threatening voices but states he feels safe here on the unit and believes his family is safe. He states talking to them daily on the phone gives him reassurance that they are safe. He reports feelings of anxiety, depression, hopelessness, racing thoughts, low motivation, low energy, and low self-esteem. He states he has mood swings \"rarely\" but had one the other night. He did not have any mood swings yesterday or this morning. He reports passive death wishes \"sometimes\" but denies any currently or this morning. He denies any thoughts to hurt self or others, SI or HI. He states he slept \"well\" last night but is still tired. He states he is eating well and has a good appetite but he is trying to eat healthier and work out to lose weight. He states he completed his drivers license renewal yesterday but there is still more to do. He denies any medication " "side effects.     Acceptance by patient: Accepting of medications and treatments  Hopefulness in recovery: Hopeful to live with aunt upon discharge  Involved in reintegration process: Patient communication with his aunt and sister on phone, had a virtual visit with sister this week and an in-person visit with aunt and cousin on unit last week.  Trusting in relationship with psychiatrist: Present  Sleep: Good  Appetite: Good  Compliance with Medications: Compliant  Group attendance: 9/10 groups yesterday  Significant events: None in the past 24 hours    Mental Status Exam  Appearance: age appropriate, casually dressed, dressed appropriately  Behavior: cooperative, calm, good  eye contact  Speech: normal rate, normal volume, normal pitch, not pressured  Mood: \"sad\"  Affect: constricted, mood-congruent  Thought Process: organized, logical, coherent, goal directed, linear  Thought Content: Mild paranoid regarding the threatening voices he hears  Perceptual Disturbances: Auditory hallucinations of voices constantly telling him they are going to kill him and his family and that he is going to hell for the past 2-3 years. Denies visual hallucinations when asked. Does not appear to be responding to internal stimuli  Sensorium: alert and oriented to person, place, time and situation  Cognition: recent and remote memory grossly intact  Consciousness: alert and awake  Attention: attention span and concentration are age appropriate  Intellect: appears to be of average intelligence  Insight: fair  Judgement: fair  Motor Activity: no abnormal movements     Vitals  Temp:  [97.2 °F (36.2 °C)-97.5 °F (36.4 °C)] 97.2 °F (36.2 °C)  HR:  [83-95] 83  Resp:  [18] 18  BP: (124-137)/(58-88) 124/58  SpO2:  [98 %-99 %] 99 %  No intake or output data in the 24 hours ending 12/06/24 1016    Lab Results: All Labs For Current Hospital Admission Reviewed        Current Facility-Administered Medications   Medication Dose Route Frequency Provider " Last Rate    acetaminophen  650 mg Oral Q4H PRN Jordan C Holter, DO      acetaminophen  650 mg Oral Q6H PRN HOLLI Lion      aluminum-magnesium hydroxide-simethicone  30 mL Oral Q4H PRN Jordan C Holter, DO      amLODIPine  5 mg Oral Daily HOLLI Lion      ARIPiprazole  30 mg Oral Daily Bora Rosario MD      Artificial Tears  1 drop Both Eyes Q3H PRN Jordan C Holter, DO      atropine  1 drop Sublingual HS Harjeet Torres, DO      haloperidol lactate  2.5 mg Intramuscular Q4H PRN Max 4/day HOLLI Lion      And    LORazepam  1 mg Intramuscular Q4H PRN Max 4/day HOLLI Lion      And    benztropine  0.5 mg Intramuscular Q4H PRN Max 4/day HOLLI Lion      benztropine  1 mg Intramuscular Q4H PRN Max 6/day Jordan C Holter, DO      haloperidol lactate  5 mg Intramuscular Q4H PRN Max 4/day HOLLI Lion      And    LORazepam  2 mg Intramuscular Q4H PRN Max 4/day HOLLI Lion      And    benztropine  1 mg Intramuscular Q4H PRN Max 4/day HOLLI Lion      benztropine  1 mg Oral Q4H PRN Max 6/day HOLLI Lion      benztropine  1 mg Oral Q4H PRN Max 6/day Jordan C Holter, DO      bisacodyl  10 mg Rectal Daily PRN HOLLI Lion      calcium carbonate  500 mg Oral BID PRN HOLLI Monge      cloZAPine  225 mg Oral HS Hardik So MD      hydrOXYzine HCL  50 mg Oral Q6H PRN Max 4/day Jordan C Holter, DO      Or    diphenhydrAMINE  50 mg Intramuscular Q6H PRN Jordan C Holter, DO      hydrOXYzine HCL  50 mg Oral Q6H PRN Max 4/day HOLLI Lion      Or    diphenhydrAMINE  50 mg Intramuscular Q6H PRN HOLLI Lion      diphenhydrAMINE-zinc acetate   Topical BID PRN HOLLI Lion      docusate sodium  100 mg Oral BID Jourdan Dickens, DO      escitalopram  20 mg Oral Daily HOLLI Lion      famotidine  20 mg Oral BID PRN HOLLI Monge      fluticasone  1 spray Each Nare Daily PRN HOLLI Monge       glycopyrrolate  1 mg Oral BID (AM & Afternoon) Bora Rosario MD      glycopyrrolate  2 mg Oral HS Bora Rosario MD      haloperidol  1 mg Oral Q6H PRN HOLLI Lion      haloperidol  2.5 mg Oral Q4H PRN Max 4/day HOLLI Lion      haloperidol  5 mg Oral Q4H PRN Max 4/day HOLLI Lion      hydroCHLOROthiazide  12.5 mg Oral Daily Pia Mantilla, HOLLI      hydrocortisone   Topical 4x Daily PRN HOLLI Lion      hydrOXYzine HCL  100 mg Oral Q6H PRN Max 4/day [facility] Holter, DO      Or    LORazepam  2 mg Intramuscular Q6H PRN [facility] Holter, DO      hydrOXYzine HCL  100 mg Oral Q6H PRN Max 4/day HOLLI Lion      Or    LORazepam  2 mg Intramuscular Q6H PRN HOLLI Lion      hydrOXYzine HCL  25 mg Oral Q6H PRN Max 4/day [facility] Holter, DO      ibuprofen  600 mg Oral Q8H PRN HOLLI Lion      influenza vaccine  0.5 mL Intramuscular Once Bora Rosario MD      loratadine  10 mg Oral Daily Brina Guillen MD      magnesium hydroxide  30 mL Oral Once Jourdan Dickens, DO      melatonin  3 mg Oral HS PRN Bora Rosario MD      melatonin  9 mg Oral HS Bora Rosario MD      methocarbamol  500 mg Oral Q6H PRN HOLLI Lion      multivitamin-minerals  1 tablet Oral Daily Eileen Jensen, HOLLI      nicotine polacrilex  2 mg Oral Q4H PRN Bora Rosario MD      OLANZapine  5 mg Oral Q4H PRN Max 3/day [facility] Holter, DO      Or    OLANZapine  2.5 mg Intramuscular Q4H PRN Max 3/day [facility] Holter, DO      OLANZapine  5 mg Oral Q3H PRN Max 3/day [facility] Holter, DO      Or    OLANZapine  5 mg Intramuscular Q3H PRN Max 3/day [facility] Holter, DO      OLANZapine  2.5 mg Oral Q4H PRN Max 6/day [facility] Holter, DO      ondansetron  4 mg Oral Q6H PRN HOLLI Lion      [facility]oprazole  [facility]      polyethylene glycol  17 g Oral Daily PRN Sofi Yoo, CRNP      polyethylene glycol  17 g Oral Daily Jourdan Dickens,       propranolol  10 mg Oral Q12H  KAYLIE HOLLI Lion      senna-docusate sodium  1 tablet Oral Daily PRN Jordan C Holter, DO      senna-docusate sodium  2 tablet Oral HS Jourdan Dickens, DO      traZODone  50 mg Oral HS PRN HOLLI Lion      white petrolatum-mineral oil   Topical TID PRN HOLLI Lion         Counseling / Coordination of Care: Total floor / unit time spent today 15 minutes. Greater than 50% of total time was spent with the patient and / or family counseling and / or somewhat receptive to supportive listening and teaching positive coping skills to deal with symptom mangement.     Patient's Rights, confidentiality and exceptions to confidentiality, use of automated medical record, Behavioral Health Services staff access to medical record, and consent to treatment reviewed.    This note has been dictated and hence there may be problems with punctuation, spelling and formatting and if anyone has any concerns please address them to Dr. Mora.  This note is not shared with patient due to potential for making patient's condition worse by knowing the content of the note.    RODRIGO Flores

## 2024-12-06 NOTE — ASSESSMENT & PLAN NOTE
Reviewed 12/6/24  Follows with medical  Continue senna-docusate sodium dose per medical team.   No associated orders from this encounter found during lookback period of 72 hours.

## 2024-12-06 NOTE — NURSING NOTE
Received pt in bed at change of shift with eyes closed; chest movement noted.  Continues the same thus this far as per q 15 min room checks.   Will continue to monitor behavior, sleeping pattern and any medical issues that may arise.    0628 Sleeping 7+ hrs thus this far

## 2024-12-06 NOTE — ASSESSMENT & PLAN NOTE
Reviewed 12/6/24  Follows with medical  No associated orders from this encounter found during lookback period of 72 hours.

## 2024-12-06 NOTE — ASSESSMENT & PLAN NOTE
Reviewed 12/6/24  Follows with medical  Continue famotidine 20 mg tablet BID per medical team   Continue glycopyrrolate 1 mg tablet BID AM and afternoon per medical team  Continue glycopyrrolate 2 mg tablet before bed per medical team  Continue pantoprazole EC 20 mg tablet every morning per medical team  No associated orders from this encounter found during lookback period of 72 hours.

## 2024-12-06 NOTE — ASSESSMENT & PLAN NOTE
"  Reviewed on 12/6/2024.    Patient reports feelings of depression and anxiety, racing thoughts, hopelessness, low self-esteem, low motivation, and low energy. He states he experiences mood swings \"sometimes but rarely\", but states he had mood swing the other night but none yesterday or this morning. He states he slept \"well\" last night. He states his mood is \"sad\". He rates his depression as 6/10 this morning. He states he is depressed and anxious due to the voices in his head constantly telling him they are going to kill him and his family and that he is going to hell. He states his anxiety is 4/10 this morning which is slightly improved. He states the voices are random people, some friends voices he recognizes, and never his own voice. He states he has been hearing the voices constantly for the past 2-3 years. He states the voices have not changed or said anything new recently. He states working out using the punching bag, listening to music and talking with peers helps him cope with the voices. He states he used the punching bag yesterday which helped. He denies any visual hallucinations. He reports persecutory delusions regarding the threatening voices he is hearing but states he feels safe here. He states he believes his family is safe and speaking to them on the phone gives him reassurance. He denies grandiose, somatic or bizarre delusions. He reports passive death wishes \"sometimes\" but denies having them currently. He denies thoughts to harm self or others, SI, or HI.     Continue Clozapine 225 mg daily at bedtime for psychosis -- to consider further careful titration for Sxs depending on response  Continue to obtain CBC with diff, CRP, and CK (weekly on Tues), and a Clozapine level and BNP (every 2 weeks on Tues). ANC 4.71 11/26/24    Continue Abilify 30 mg once daily for psychosis  Continue Escitalopram 20 mg once daily for depression and anxiety  Continue Senna-Docusate sodium one 50 mg tablet daily " before bed for constipation  - decreased from two tablets daily on 11/3 due to reported loose stools  Continue propanolol 10 mg twice daily for anxiety   Continue amlodipine 5 mg PO daily     Pt remains on dual antipsychotic Tx due to failure on monotherapy      ACT team referral was made  No associated orders from this encounter found during lookback period of 72 hours.

## 2024-12-06 NOTE — ASSESSMENT & PLAN NOTE
Reviewed 12/6/24  Continue to encourage cessation upon discharge  No associated orders from this encounter found during lookback period of 72 hours.   The patient is a 71y Female complaining of The patient is a 71y Female complaining of r hip pain

## 2024-12-06 NOTE — NURSING NOTE
"Pt is accepting of medications without incidence and meal compliant. Pt is visible in the milieu and social with select peers. Pt reports AH are the \"same as always\" and endorses anxiety and depression at times. Pt is otherwise bright on approach, smiling and jokes with staff and select peers. No new concerns at this time.   "

## 2024-12-06 NOTE — NURSING NOTE
Pt is visible on the unit and social with select peers. Consumed 100% of dinner. Took medications without incidence. Pt is pleasant and cooperative. Attended 9/10 groups. Reports anxiety, depression, and threatening AH. Denies SI/HI/VH. Pt uses the exercise room as a coping skill. No behavioral issues. Pt offers no complaints.

## 2024-12-06 NOTE — ASSESSMENT & PLAN NOTE
Reviewed 12/6/24  Ongoing by medical  No associated orders from this encounter found during lookback period of 72 hours.

## 2024-12-06 NOTE — SOCIAL WORK
This writer e-mailed [facility] and [facility] ACT services requesting an update on county funding availability for the patient.

## 2024-12-06 NOTE — PLAN OF CARE
Problem: Alteration in Thoughts and Perception  Goal: Treatment Goal: Gain control of psychotic behaviors/thinking, reduce/eliminate presenting symptoms and demonstrate improved reality functioning upon discharge  Outcome: Progressing  Goal: Refrain from acting on delusional thinking/internal stimuli  Description: Interventions:  - Monitor patient closely, per order   - Utilize least restrictive measures   - Set reasonable limits, give positive feedback for acceptable   - Administer medications as ordered and monitor of potential side effects  Outcome: Progressing  Goal: Agree to be compliant with medication regime, as prescribed and report medication side effects  Description: Interventions:  - Offer appropriate PRN medication and supervise ingestion; conduct AIMS, as needed   Outcome: Progressing  Goal: Attend and participate in unit activities, including therapeutic, recreational, and educational groups  Description: Interventions:  -Encourage Visitation and family involvement in care  Outcome: Progressing  Goal: Complete daily ADLs, including personal hygiene independently, as able  Description: Interventions:  - Observe, teach, and assist patient with ADLS  - Monitor and promote a balance of rest/activity, with adequate nutrition and elimination   Outcome: Progressing     Problem: Ineffective Coping  Goal: Patient/Family verbalizes awareness of resources  Outcome: Progressing     Problem: Depression  Goal: Verbalize thoughts and feelings  Description: Interventions:  - Assess and re-assess patient's level of risk   - Engage patient in 1:1 interactions, daily, for a minimum of 15 minutes   - Encourage patient to express feelings, fears, frustrations, hopes   Outcome: Progressing  Goal: Refrain from harming self  Description: Interventions:  - Monitor patient closely, per order   - Supervise medication ingestion, monitor effects and side effects   Outcome: Progressing  Goal: Refrain from isolation  Description:  Interventions:  - Develop a trusting relationship   - Encourage socialization   Outcome: Progressing  Goal: Refrain from self-neglect  Outcome: Progressing  Goal: Attend and participate in unit activities, including therapeutic, recreational, and educational groups  Description: Interventions:  - Provide therapeutic and educational activities daily, encourage attendance and participation, and document same in the medical record   Outcome: Progressing     Problem: Anxiety  Goal: Anxiety is at manageable level  Description: Interventions:  - Assess and monitor patient's anxiety level.   - Monitor for signs and symptoms (heart palpitations, chest pain, shortness of breath, headaches, nausea, feeling jumpy, restlessness, irritable, apprehensive).   - Collaborate with interdisciplinary team and initiate plan and interventions as ordered.  - [place] patient to unit/surroundings  - Explain treatment plan  - Encourage participation in care  - Encourage verbalization of concerns/fears  - Identify coping mechanisms  - Assist in developing anxiety-reducing skills  - Administer/offer alternative therapies  - Limit or eliminate stimulants  Outcome: Progressing     Problem: Alteration in Orientation  Goal: Treatment Goal: Demonstrate a reduction of confusion and improved orientation to person, place, time and/or situation upon discharge, according to optimum baseline/ability  Outcome: Progressing  Goal: Complete daily ADLs, including personal hygiene independently, as able  Description: Interventions:  - Observe, teach, and assist patient with ADLS  Outcome: Progressing     Problem: DISCHARGE PLANNING - CARE MANAGEMENT  Goal: Discharge to post-acute care or home with appropriate resources  Description: INTERVENTIONS:  - Conduct assessment to determine patient/family and health care team treatment goals, and need for post-acute services based on payer coverage, community resources, and patient preferences, and barriers to  discharge  - Address psychosocial, clinical, and financial barriers to discharge as identified in assessment in conjunction with the patient/family and health care team  - Arrange appropriate level of post-acute services according to patient's   needs and preference and payer coverage in collaboration with the physician and health care team  - Communicate with and update the patient/family, physician, and health care team regarding progress on the discharge plan  - Arrange appropriate transportation to post-acute venues  Outcome: Progressing

## 2024-12-07 PROCEDURE — 99232 SBSQ HOSP IP/OBS MODERATE 35: CPT | Performed by: PHYSICIAN ASSISTANT

## 2024-12-07 RX ADMIN — DOCUSATE SODIUM 100 MG: 100 CAPSULE, LIQUID FILLED ORAL at 08:40

## 2024-12-07 RX ADMIN — NICOTINE POLACRILEX 2 MG: 2 GUM, CHEWING ORAL at 12:39

## 2024-12-07 RX ADMIN — PROPRANOLOL HYDROCHLORIDE 10 MG: 10 TABLET ORAL at 21:17

## 2024-12-07 RX ADMIN — HYDROCHLOROTHIAZIDE 12.5 MG: 12.5 TABLET ORAL at 08:38

## 2024-12-07 RX ADMIN — NICOTINE POLACRILEX 2 MG: 2 GUM, CHEWING ORAL at 17:17

## 2024-12-07 RX ADMIN — PANTOPRAZOLE SODIUM 20 MG: 20 TABLET, DELAYED RELEASE ORAL at 08:38

## 2024-12-07 RX ADMIN — SENNOSIDES AND DOCUSATE SODIUM 2 TABLET: 50; 8.6 TABLET ORAL at 21:16

## 2024-12-07 RX ADMIN — ARIPIPRAZOLE 30 MG: 15 TABLET ORAL at 08:38

## 2024-12-07 RX ADMIN — ESCITALOPRAM OXALATE 20 MG: 10 TABLET, FILM COATED ORAL at 08:39

## 2024-12-07 RX ADMIN — NICOTINE POLACRILEX 2 MG: 2 GUM, CHEWING ORAL at 08:38

## 2024-12-07 RX ADMIN — AMLODIPINE BESYLATE 5 MG: 5 TABLET ORAL at 08:38

## 2024-12-07 RX ADMIN — MULTIPLE VITAMINS W/ MINERALS TAB 1 TABLET: TAB ORAL at 08:38

## 2024-12-07 RX ADMIN — PROPRANOLOL HYDROCHLORIDE 10 MG: 10 TABLET ORAL at 08:38

## 2024-12-07 RX ADMIN — CLOZAPINE 225 MG: 25 TABLET ORAL at 21:16

## 2024-12-07 RX ADMIN — GLYCOPYRROLATE 2 MG: 1 TABLET ORAL at 21:16

## 2024-12-07 RX ADMIN — MELATONIN TAB 3 MG 9 MG: 3 TAB at 21:16

## 2024-12-07 RX ADMIN — LORATADINE 10 MG: 10 TABLET ORAL at 08:38

## 2024-12-07 RX ADMIN — ATROPINE SULFATE 1 DROP: 10 SOLUTION/ DROPS OPHTHALMIC at 21:29

## 2024-12-07 RX ADMIN — GLYCOPYRROLATE 1 MG: 1 TABLET ORAL at 08:38

## 2024-12-07 RX ADMIN — NICOTINE POLACRILEX 2 MG: 2 GUM, CHEWING ORAL at 21:17

## 2024-12-07 RX ADMIN — GLYCOPYRROLATE 1 MG: 1 TABLET ORAL at 14:05

## 2024-12-07 NOTE — PROGRESS NOTES
Psychiatric Progress Note - Department of Behavioral Health   Alberto Berumen 29 y.o. male MRN: [id number]  Unit/Bed#: [facility] 112-02 Encounter: [id number]    ASSESSMENT & PLAN     Assessment & Plan  Schizoaffective disorder, bipolar type (HCC)    Reviewed on 12/7/2024.    Patient was found in his room sleeping. He did engage in encounter, however appears dysphoric and anxious today. He continues to have AH telling him to kill himself and his family. Today he is denying any self harm/ suicidal plan or intent.     Treatment plan as per primary team:    Continue Clozapine 225 mg daily at bedtime for psychosis -- to consider further careful titration for Sx's depending on response  Continue to obtain CBC with diff, CRP, and CK (weekly on Tues), and a Clozapine level and BNP (every 2 weeks on Tuesdays). ANC 4.71 11/26/24    Continue Abilify 30 mg once daily for psychosis  Continue Escitalopram 20 mg once daily for depression and anxiety  Continue Senna-Docusate sodium one 50 mg tablet daily before bed for constipation  - Decreased from two tablets daily on 11/3 due to reported loose stools  Continue Propanolol 10 mg twice daily for anxiety   Continue Amlodipine 5 mg PO daily     Pt remains on dual antipsychotic Tx due to failure on monotherapy      Dispo as per primary team: ACT team referral was made  No associated orders from this encounter found during lookback period of 72 hours.    Chronic idiopathic constipation  Reviewed 12/7/24  Follows with medical team  Continue senna-docusate sodium dose per medical team.   No associated orders from this encounter found during lookback period of 72 hours.  GERD (gastroesophageal reflux disease)  Reviewed 12/7/24  Follows with medical  Continue famotidine 20 mg tablet BID per medical team   Continue glycopyrrolate 1 mg tablet BID AM and afternoon per medical team  Continue glycopyrrolate 2 mg tablet before bed per medical team  Continue pantoprazole EC 20 mg tablet every  morning per medical team  No associated orders from this encounter found during lookback period of 72 hours.  Medical clearance for psychiatric admission  Reviewed 12/7/24  Ongoing evaluation by medical team  No associated orders from this encounter found during lookback period of 72 hours.    Tobacco abuse  Reviewed 12/7/24  Continue to encourage cessation upon discharge  No associated orders from this encounter found during lookback period of 72 hours.  Elevated hemoglobin A1c  Reviewed 12/7/24  Follows with medical team  No associated orders from this encounter found during lookback period of 72 hours.  Class 2 obesity in adult  Reviewed 12/7/24  Follows with medical team  No associated orders from this encounter found during lookback period of 72 hours.    Primary hypertension  Reviewed 12/7/24  Follows with medical team  No associated orders from this encounter found during lookback period of 72 hours.    Treatment Recommendations/Precautions:  Continue to promote patient participation in therapeutic milieu.  Continue medical management per medicine.  Continue previously prescribed psychotropic medication regimen; see below.  Continue behavioral health checks q.15 minutes.   Continue vitals per behavioral health unit protocol.  Discharge date per primary team    SUBJECTIVE     Patient evaluated this a.m. for continuity of care. Patient was discussed at length with charge RN. Per nursing, he is often visible and social in the milieu. He did refuse his miralax this morning. He refused breakfast yesterday, but ate 100% of lunch and dinner. He had a bowel movement yesterday and showered yesterday. He continues to have intermittent AH - voices telling him to kill himself and his family. Today he is seen in his bedroom. He engages in encounter. He appears dysphoric and anxious today. He denies any concerns with his psychiatric medications. He reports adequate sleep and appetite. No acute distress is noted throughout  evaluation. Alberto Berumen denies suicidal/homicidal ideation in addition to thoughts of self-injury, receptive to crisis planning provided by this writer, chuckie for safety in the inpatient setting.  PSYCHIATRIC REVIEW OF SYSTEMS     Behavior over the last 24 hours:  unchanged  Sleep: normal  Appetite: normal  Medication side effects: No    REVIEW OF SYSTEMS   Review of systems: no complaints    OBJECTIVE     Vital Signs in Past 24 Hours:  Temp:  [97.8 °F (36.6 °C)-98 °F (36.7 °C)] 97.8 °F (36.6 °C)  HR:  [89-98] 89  BP: (118-133)/(64-68) 133/64  Resp:  [16-18] 18  SpO2:  [98 %] 98 %  O2 Device: None (Room air)    Intake/Output in Past 24 hours:  No intake/output data recorded.  No intake/output data recorded.        Laboratory Results:  I have personally reviewed all pertinent laboratory/tests results.  Most Recent Labs:   Lab Results   Component Value Date    WBC 8.88 11/26/2024    RBC 5.54 11/26/2024    HGB 13.0 11/26/2024    HCT 43.4 11/26/2024     11/26/2024    RDW 14.4 11/26/2024    NEUTROABS 4.71 11/26/2024    SODIUM 140 10/16/2024    K 3.8 10/16/2024     10/16/2024    CO2 29 10/16/2024    BUN 9 10/16/2024    CREATININE 0.91 10/16/2024    GLUC 94 10/16/2024    GLUF 94 10/16/2024    CALCIUM 9.5 10/16/2024    AST 26 09/30/2024    ALT 42 09/30/2024    ALKPHOS 64 09/30/2024    TP 6.6 09/30/2024    ALB 3.8 09/30/2024    TBILI 0.47 09/30/2024    CHOLESTEROL 156 03/14/2024    HDL 44 03/14/2024    TRIG 133 03/14/2024    LDLCALC 85 03/14/2024    NONHDLC 112 03/14/2024    LITHIUM 0.61 01/09/2024    VQB2MDDSAUCC 1.062 11/15/2023    HGBA1C 5.0 04/01/2024    EAG 97 04/01/2024       Behavioral Health Medications: all current active meds have been reviewed and current meds:   Current Facility-Administered Medications:     acetaminophen (TYLENOL) tablet 650 mg, Q4H PRN    acetaminophen (TYLENOL) tablet 650 mg, Q6H PRN    aluminum-magnesium hydroxide-simethicone (MAALOX) oral suspension 30 mL, Q4H PRN     amLODIPine (NORVASC) tablet 5 mg, Daily    ARIPiprazole (ABILIFY) tablet 30 mg, Daily    Artificial Tears ophthalmic solution 1 drop, Q3H PRN    atropine (ISOPTO ATROPINE) 1 % ophthalmic solution 1 drop, HS    haloperidol lactate (HALDOL) injection 2.5 mg, Q4H PRN Max 4/day **AND** LORazepam (ATIVAN) injection 1 mg, Q4H PRN Max 4/day **AND** benztropine (COGENTIN) injection 0.5 mg, Q4H PRN Max 4/day    benztropine (COGENTIN) injection 1 mg, Q4H PRN Max 6/day    haloperidol lactate (HALDOL) injection 5 mg, Q4H PRN Max 4/day **AND** LORazepam (ATIVAN) injection 2 mg, Q4H PRN Max 4/day **AND** benztropine (COGENTIN) injection 1 mg, Q4H PRN Max 4/day    benztropine (COGENTIN) tablet 1 mg, Q4H PRN Max 6/day    benztropine (COGENTIN) tablet 1 mg, Q4H PRN Max 6/day    bisacodyl (DULCOLAX) rectal suppository 10 mg, Daily PRN    calcium carbonate (TUMS) chewable tablet 500 mg, BID PRN    cloZAPine (CLOZARIL) tablet 225 mg, HS    hydrOXYzine HCL (ATARAX) tablet 50 mg, Q6H PRN Max 4/day **OR** diphenhydrAMINE (BENADRYL) injection 50 mg, Q6H PRN    hydrOXYzine HCL (ATARAX) tablet 50 mg, Q6H PRN Max 4/day **OR** diphenhydrAMINE (BENADRYL) injection 50 mg, Q6H PRN    diphenhydrAMINE-zinc acetate (BENADRYL) 2-0.1 % cream, BID PRN    docusate sodium (COLACE) capsule 100 mg, BID    escitalopram (LEXAPRO) tablet 20 mg, Daily    famotidine (PEPCID) tablet 20 mg, BID PRN    fluticasone (FLONASE) 50 mcg/act nasal spray 1 spray, Daily PRN    glycopyrrolate (ROBINUL) tablet 1 mg, BID (AM & Afternoon)    glycopyrrolate (ROBINUL) tablet 2 mg, HS    haloperidol (HALDOL) tablet 1 mg, Q6H PRN    haloperidol (HALDOL) tablet 2.5 mg, Q4H PRN Max 4/day    haloperidol (HALDOL) tablet 5 mg, Q4H PRN Max 4/day    hydroCHLOROthiazide tablet 12.5 mg, Daily    hydrocortisone 1 % ointment, 4x Daily PRN    hydrOXYzine HCL (ATARAX) tablet 100 mg, Q6H PRN Max 4/day **OR** LORazepam (ATIVAN) injection 2 mg, Q6H PRN    hydrOXYzine HCL (ATARAX) tablet 100 mg,  Q6H PRN Max 4/day **OR** LORazepam (ATIVAN) injection 2 mg, Q6H PRN    hydrOXYzine HCL (ATARAX) tablet 25 mg, Q6H PRN Max 4/day    ibuprofen (MOTRIN) tablet 600 mg, Q8H PRN    influenza vaccine (PF) (Fluarix) IM injection 0.5 mL, Once    loratadine (CLARITIN) tablet 10 mg, Daily    magnesium hydroxide (MILK OF MAGNESIA) oral suspension 30 mL, Once    melatonin tablet 3 mg, HS PRN    melatonin tablet 9 mg, HS    methocarbamol (ROBAXIN) tablet 500 mg, Q6H PRN    multivitamin-minerals (CENTRUM) tablet 1 tablet, Daily    nicotine polacrilex (NICORETTE) gum 2 mg, Q4H PRN    OLANZapine (ZyPREXA) tablet 5 mg, Q4H PRN Max 3/day **OR** OLANZapine (ZyPREXA) IM injection 2.5 mg, Q4H PRN Max 3/day    OLANZapine (ZyPREXA) tablet 5 mg, Q3H PRN Max 3/day **OR** OLANZapine (ZyPREXA) IM injection 5 mg, Q3H PRN Max 3/day    OLANZapine (ZyPREXA) tablet 2.5 mg, Q4H PRN Max 6/day    ondansetron (ZOFRAN-ODT) dispersible tablet 4 mg, Q6H PRN    pantoprazole (PROTONIX) EC tablet 20 mg, QAM    polyethylene glycol (MIRALAX) packet 17 g, Daily PRN    polyethylene glycol (MIRALAX) packet 17 g, Daily    propranolol (INDERAL) tablet 10 mg, Q12H KAYLIE    senna-docusate sodium (SENOKOT S) 8.6-50 mg per tablet 1 tablet, Daily PRN    senna-docusate sodium (SENOKOT S) 8.6-50 mg per tablet 2 tablet, HS    traZODone (DESYREL) tablet 50 mg, HS PRN    white petrolatum-mineral oil (EUCERIN,HYDROCERIN) cream, TID PRN.    Risks, benefits and possible side effects of Medications:   Risks, benefits, and possible side effects of medications explained to patient and patient verbalizes understanding.          Mental Status Evaluation:  Appearance:  age appropriate, casually dressed, and appears stated age   Behavior:  Calm and cooperative, intermittent eye contact    Speech:  normal volume and normal rate   Mood:  anxious and depressed   Affect:  constricted and mood-congruent   Language Coherent    Thought Process:  goal directed and logical   Thought Content:   Some paranoia    Perceptual Disturbances: Auditory hallucinations telling him to kill himself and his family, does not appear to be responding to internal stimuli   Risk Potential: Suicidal Ideations none, Homicidal Ideations none, and Potential for Aggression No   Sensorium:  person, place, and time/date   Cognition:  recent and remote memory grossly intact   Consciousness:  alert and awake    Attention: attention span and concentration were age appropriate   Insight:  fair   Judgment: fair   Intellect Not assessed   Gait/Station: Not assessed- laying in bed   Motor Activity: no abnormal movements     Memory: Short and long term memory  intact      Progress Toward Goals: unchanged, as evidenced by their participation (or lack thereof) in individual, social and therapeutic milieu in addition to adherence to their medication regimen.    Suicide/Homicide Risk Assessment:  Risk of Harm to Self:   Nursing Suicide Risk Assessment Last 24 hours: C-SSRS Risk (Since Last Contact)  Calculated C-SSRS Risk Score (Since Last Contact): No Risk Indicated    Risk of Harm to Others:  Nursing Homicide Risk Assessment: Violence Risk to Others: Denies within past 6 months      Recommended Treatment:   See above for assessment and plan.      Counseling/Coordination of Care    I have expended greater than 15 minutes in which more than 50% of this time was expended in counseling/coordination of patient care relating to diagnostic results, prognosis, potential risks and benefits of management options, instructions for appropriate management, patient and/or collateral education, importance of adherence to management and/or risk factor reductions. Patient's rights, confidentiality, exceptions to confidentiality, use of electronic medical record including appropriate staff access to medical record regarding behavioral health services and consent to treatment were reviewed.    Note Share:     This note was not shared with the patient due to  reasonable likelihood of causing patient harm     This note has been constructed using a voice recognition system. There may be translation, syntax,  or grammatical errors. If you have any questions, please contact the dictating provider.    Tracy Hernandez PA-C 12/07/24

## 2024-12-07 NOTE — ASSESSMENT & PLAN NOTE
Reviewed on 12/7/2024.    Patient was found in his room sleeping. He did engage in encounter, however appears dysphoric and anxious today. He continues to have AH telling him to kill himself and his family. Today he is denying any self harm/ suicidal plan or intent.     Treatment plan as per primary team:    Continue Clozapine 225 mg daily at bedtime for psychosis -- to consider further careful titration for Sx's depending on response  Continue to obtain CBC with diff, CRP, and CK (weekly on Tues), and a Clozapine level and BNP (every 2 weeks on Tuesdays). ANC 4.71 11/26/24    Continue Abilify 30 mg once daily for psychosis  Continue Escitalopram 20 mg once daily for depression and anxiety  Continue Senna-Docusate sodium one 50 mg tablet daily before bed for constipation  - Decreased from two tablets daily on 11/3 due to reported loose stools  Continue Propanolol 10 mg twice daily for anxiety   Continue Amlodipine 5 mg PO daily     Pt remains on dual antipsychotic Tx due to failure on monotherapy      Dispo as per primary team: ACT team referral was made  No associated orders from this encounter found during lookback period of 72 hours.

## 2024-12-07 NOTE — PLAN OF CARE
Problem: Alteration in Thoughts and Perception  Goal: Treatment Goal: Gain control of psychotic behaviors/thinking, reduce/eliminate presenting symptoms and demonstrate improved reality functioning upon discharge  Outcome: Progressing  Goal: Refrain from acting on delusional thinking/internal stimuli  Description: Interventions:  - Monitor patient closely, per order   - Utilize least restrictive measures   - Set reasonable limits, give positive feedback for acceptable   - Administer medications as ordered and monitor of potential side effects  Outcome: Progressing  Goal: Agree to be compliant with medication regime, as prescribed and report medication side effects  Description: Interventions:  - Offer appropriate PRN medication and supervise ingestion; conduct AIMS, as needed   Outcome: Progressing  Goal: Recognize dysfunctional thoughts, communicate reality-based thoughts at the time of discharge  Description: Interventions:  - Provide medication and psycho-education to assist patient in compliance and developing insight into his/her illness   Outcome: Progressing  Goal: Complete daily ADLs, including personal hygiene independently, as able  Description: Interventions:  - Observe, teach, and assist patient with ADLS  - Monitor and promote a balance of rest/activity, with adequate nutrition and elimination   Outcome: Progressing     Problem: Ineffective Coping  Goal: Demonstrates healthy coping skills  Outcome: Progressing  Goal: Participates in unit activities  Description: Interventions:  - Provide therapeutic environment   - Provide required programming   - Redirect inappropriate behaviors   Outcome: Progressing  Goal: Patient/Family participate in treatment and DC plans  Description: Interventions:  - Provide therapeutic environment  Outcome: Progressing  Goal: Patient/Family verbalizes awareness of resources  Outcome: Progressing     Problem: Depression  Goal: Treatment Goal: Demonstrate behavioral control of  depressive symptoms, verbalize feelings of improved mood/affect, and adopt new coping skills prior to discharge  Outcome: Progressing  Goal: Verbalize thoughts and feelings  Description: Interventions:  - Assess and re-assess patient's level of risk   - Engage patient in 1:1 interactions, daily, for a minimum of 15 minutes   - Encourage patient to express feelings, fears, frustrations, hopes   Outcome: Progressing  Goal: Refrain from harming self  Description: Interventions:  - Monitor patient closely, per order   - Supervise medication ingestion, monitor effects and side effects   Outcome: Progressing  Goal: Refrain from isolation  Description: Interventions:  - Develop a trusting relationship   - Encourage socialization   Outcome: Progressing  Goal: Refrain from self-neglect  Outcome: Progressing  Goal: Attend and participate in unit activities, including therapeutic, recreational, and educational groups  Description: Interventions:  - Provide therapeutic and educational activities daily, encourage attendance and participation, and document same in the medical record   Outcome: Progressing     Problem: Anxiety  Goal: Anxiety is at manageable level  Description: Interventions:  - Assess and monitor patient's anxiety level.   - Monitor for signs and symptoms (heart palpitations, chest pain, shortness of breath, headaches, nausea, feeling jumpy, restlessness, irritable, apprehensive).   - Collaborate with interdisciplinary team and initiate plan and interventions as ordered.  - [place] patient to unit/surroundings  - Explain treatment plan  - Encourage participation in care  - Encourage verbalization of concerns/fears  - Identify coping mechanisms  - Assist in developing anxiety-reducing skills  - Administer/offer alternative therapies  - Limit or eliminate stimulants  Outcome: Progressing     Problem: Alteration in Orientation  Goal: Treatment Goal: Demonstrate a reduction of confusion and improved orientation to  person, place, time and/or situation upon discharge, according to optimum baseline/ability  Outcome: Progressing  Goal: Interact with staff daily  Description: Interventions:  - Assess and re-assess patient's level of orientation  - Engage patient in 1 on 1 interactions, daily, for a minimum of 15 minutes   - Establish rapport/trust with patient   Outcome: Progressing  Goal: Allow medical examinations, as recommended  Description: Interventions:  - Provide physical/neurological exams and/or referrals, per provider   Outcome: Progressing  Goal: Cooperate with recommended testing/procedures  Description: Interventions:  - Determine need for ancillary testing  - Observe for mental status changes  - Implement falls/precaution protocol   Outcome: Progressing  Goal: Complete daily ADLs, including personal hygiene independently, as able  Description: Interventions:  - Observe, teach, and assist patient with ADLS  Outcome: Progressing

## 2024-12-07 NOTE — ASSESSMENT & PLAN NOTE
Reviewed 12/7/24  Continue to encourage cessation upon discharge  No associated orders from this encounter found during lookback period of 72 hours.

## 2024-12-07 NOTE — ASSESSMENT & PLAN NOTE
Reviewed 12/7/24  Follows with medical  Continue famotidine 20 mg tablet BID per medical team   Continue glycopyrrolate 1 mg tablet BID AM and afternoon per medical team  Continue glycopyrrolate 2 mg tablet before bed per medical team  Continue pantoprazole EC 20 mg tablet every morning per medical team  No associated orders from this encounter found during lookback period of 72 hours.

## 2024-12-07 NOTE — ASSESSMENT & PLAN NOTE
Reviewed 12/7/24  Ongoing evaluation by medical team  No associated orders from this encounter found during lookback period of 72 hours.

## 2024-12-07 NOTE — ASSESSMENT & PLAN NOTE
Reviewed 12/7/24  Follows with medical team  No associated orders from this encounter found during lookback period of 72 hours.

## 2024-12-07 NOTE — NURSING NOTE
Received pt in bed at change of shift with eyes closed; chest movement noted.  Continues the same thus this far as per q 15 min room checks.   Will continue to monitor behavior, sleeping pattern and any medical issues that may arise.    0551:  Sleeping 7+ hrs thus this far

## 2024-12-07 NOTE — ASSESSMENT & PLAN NOTE
Reviewed 12/7/24  Follows with medical team  Continue senna-docusate sodium dose per medical team.   No associated orders from this encounter found during lookback period of 72 hours.

## 2024-12-08 PROCEDURE — 99232 SBSQ HOSP IP/OBS MODERATE 35: CPT | Performed by: PHYSICIAN ASSISTANT

## 2024-12-08 RX ADMIN — DOCUSATE SODIUM 100 MG: 100 CAPSULE, LIQUID FILLED ORAL at 17:21

## 2024-12-08 RX ADMIN — ATROPINE SULFATE 1 DROP: 10 SOLUTION/ DROPS OPHTHALMIC at 21:15

## 2024-12-08 RX ADMIN — HYDROCHLOROTHIAZIDE 12.5 MG: 12.5 TABLET ORAL at 08:43

## 2024-12-08 RX ADMIN — PROPRANOLOL HYDROCHLORIDE 10 MG: 10 TABLET ORAL at 21:15

## 2024-12-08 RX ADMIN — GLYCOPYRROLATE 1 MG: 1 TABLET ORAL at 08:44

## 2024-12-08 RX ADMIN — SENNOSIDES AND DOCUSATE SODIUM 2 TABLET: 50; 8.6 TABLET ORAL at 21:14

## 2024-12-08 RX ADMIN — LORATADINE 10 MG: 10 TABLET ORAL at 08:44

## 2024-12-08 RX ADMIN — NICOTINE POLACRILEX 2 MG: 2 GUM, CHEWING ORAL at 12:44

## 2024-12-08 RX ADMIN — NICOTINE POLACRILEX 2 MG: 2 GUM, CHEWING ORAL at 21:15

## 2024-12-08 RX ADMIN — ARIPIPRAZOLE 30 MG: 15 TABLET ORAL at 08:44

## 2024-12-08 RX ADMIN — ESCITALOPRAM OXALATE 20 MG: 10 TABLET, FILM COATED ORAL at 08:44

## 2024-12-08 RX ADMIN — GLYCOPYRROLATE 1 MG: 1 TABLET ORAL at 13:25

## 2024-12-08 RX ADMIN — PANTOPRAZOLE SODIUM 20 MG: 20 TABLET, DELAYED RELEASE ORAL at 08:44

## 2024-12-08 RX ADMIN — MELATONIN TAB 3 MG 9 MG: 3 TAB at 21:15

## 2024-12-08 RX ADMIN — MULTIPLE VITAMINS W/ MINERALS TAB 1 TABLET: TAB ORAL at 08:44

## 2024-12-08 RX ADMIN — GLYCOPYRROLATE 2 MG: 1 TABLET ORAL at 21:14

## 2024-12-08 RX ADMIN — NICOTINE POLACRILEX 2 MG: 2 GUM, CHEWING ORAL at 17:21

## 2024-12-08 RX ADMIN — CLOZAPINE 225 MG: 25 TABLET ORAL at 21:14

## 2024-12-08 RX ADMIN — PROPRANOLOL HYDROCHLORIDE 10 MG: 10 TABLET ORAL at 08:44

## 2024-12-08 RX ADMIN — DOCUSATE SODIUM 100 MG: 100 CAPSULE, LIQUID FILLED ORAL at 08:44

## 2024-12-08 RX ADMIN — NICOTINE POLACRILEX 2 MG: 2 GUM, CHEWING ORAL at 08:44

## 2024-12-08 NOTE — PLAN OF CARE
Problem: Alteration in Thoughts and Perception  Goal: Refrain from acting on delusional thinking/internal stimuli  Description: Interventions:  - Monitor patient closely, per order   - Utilize least restrictive measures   - Set reasonable limits, give positive feedback for acceptable   - Administer medications as ordered and monitor of potential side effects  Outcome: Progressing  Goal: Agree to be compliant with medication regime, as prescribed and report medication side effects  Description: Interventions:  - Offer appropriate PRN medication and supervise ingestion; conduct AIMS, as needed   Outcome: Progressing  Goal: Attend and participate in unit activities, including therapeutic, recreational, and educational groups  Description: Interventions:  -Encourage Visitation and family involvement in care  Outcome: Progressing  Goal: Complete daily ADLs, including personal hygiene independently, as able  Description: Interventions:  - Observe, teach, and assist patient with ADLS  - Monitor and promote a balance of rest/activity, with adequate nutrition and elimination   Outcome: Progressing     Problem: Ineffective Coping  Goal: Identifies healthy coping skills  Outcome: Progressing     Problem: Depression  Goal: Refrain from harming self  Description: Interventions:  - Monitor patient closely, per order   - Supervise medication ingestion, monitor effects and side effects   Outcome: Progressing  Goal: Refrain from isolation  Description: Interventions:  - Develop a trusting relationship   - Encourage socialization   Outcome: Progressing  Goal: Refrain from self-neglect  Outcome: Progressing     Problem: Alteration in Thoughts and Perception  Goal: Treatment Goal: Gain control of psychotic behaviors/thinking, reduce/eliminate presenting symptoms and demonstrate improved reality functioning upon discharge  Outcome: Not Progressing

## 2024-12-08 NOTE — ASSESSMENT & PLAN NOTE
Reviewed 12/8/24  Follows with medical  Continue famotidine 20 mg tablet BID per medical team   Continue glycopyrrolate 1 mg tablet BID AM and afternoon per medical team  Continue glycopyrrolate 2 mg tablet before bed per medical team  Continue pantoprazole EC 20 mg tablet every morning per medical team  No associated orders from this encounter found during lookback period of 72 hours.

## 2024-12-08 NOTE — ASSESSMENT & PLAN NOTE
Reviewed 12/8/24  Follows with medical team  Continue senna-docusate sodium dose per medical team.   No associated orders from this encounter found during lookback period of 72 hours.

## 2024-12-08 NOTE — NURSING NOTE
Weekly wellness assessment completed. Pt lung sounds clear in all lung fields. No edema noted. Bowel sounds +x4. B/l pedal pulses papable. Skin intact, except b/l feet is dry. Skin warm and color within normal limits for patient.

## 2024-12-08 NOTE — ASSESSMENT & PLAN NOTE
Reviewed 12/8/24  Continue to encourage cessation upon discharge  No associated orders from this encounter found during lookback period of 72 hours.

## 2024-12-08 NOTE — NURSING NOTE
Alberto maintained on ongoing SAFE precaution without incident on this shift. Awake, alert,pleasant and cooperative.   Attended and participated in 2 out of 7 groups today. Continues to be compliant with medication regimen,. Admit continues to have he same auditory hallucination about someone is going to hurt him and his family. He is coping with the voices, that is holding him back from becoming the man he wants to be.  He talks to his peers and share his struggle with some of the staff he trust.

## 2024-12-08 NOTE — NURSING NOTE
Alert, cooperative and visible intermittently. No SI or HI noted. Pt states he some depression, and anxiety. Denies pain. Attended coping skilled. Refused breakfast and consumed 100% of lunch. Took all medication without prompting. Maintained on safe precautions without incident. Will continue to monitor progress and recovery program.

## 2024-12-08 NOTE — ASSESSMENT & PLAN NOTE
Reviewed on 12/8/2024.    Patient was found in his room sleeping. He continues to note some depressive and anxiety symptoms. He continues to have AH telling him to kill himself and his family. Today he is denying any self harm/ suicidal plan or intent.     Treatment plan as per primary team:    Continue Clozapine 225 mg daily at bedtime for psychosis -- to consider further careful titration for Sx's depending on response  Continue to obtain CBC with diff, CRP, and CK (weekly on Tues), and a Clozapine level and BNP (every 2 weeks on Tuesdays). ANC 4.71 11/26/24    Continue Abilify 30 mg once daily for psychosis  Continue Escitalopram 20 mg once daily for depression and anxiety  Continue Senna-Docusate sodium one 50 mg tablet daily before bed for constipation  - Decreased from two tablets daily on 11/3 due to reported loose stools  Continue Propanolol 10 mg twice daily for anxiety   Continue Amlodipine 5 mg PO daily     Pt remains on dual antipsychotic Tx due to failure on monotherapy      Dispo as per primary team: ACT team referral was made  No associated orders from this encounter found during lookback period of 72 hours.

## 2024-12-08 NOTE — PROGRESS NOTES
Psychiatric Progress Note - Department of Behavioral Health   Alberto Berumen 29 y.o. male MRN: [id number]  Unit/Bed#: [facility] 112-02 Encounter: [id number]    ASSESSMENT & PLAN     Assessment & Plan  Schizoaffective disorder, bipolar type (HCC)    Reviewed on 12/8/2024.    Patient was found in his room sleeping. He continues to note some depressive and anxiety symptoms. He continues to have AH telling him to kill himself and his family. Today he is denying any self harm/ suicidal plan or intent.     Treatment plan as per primary team:    Continue Clozapine 225 mg daily at bedtime for psychosis -- to consider further careful titration for Sx's depending on response  Continue to obtain CBC with diff, CRP, and CK (weekly on Tues), and a Clozapine level and BNP (every 2 weeks on Tuesdays). ANC 4.71 11/26/24    Continue Abilify 30 mg once daily for psychosis  Continue Escitalopram 20 mg once daily for depression and anxiety  Continue Senna-Docusate sodium one 50 mg tablet daily before bed for constipation  - Decreased from two tablets daily on 11/3 due to reported loose stools  Continue Propanolol 10 mg twice daily for anxiety   Continue Amlodipine 5 mg PO daily     Pt remains on dual antipsychotic Tx due to failure on monotherapy      Dispo as per primary team: ACT team referral was made  No associated orders from this encounter found during lookback period of 72 hours.    Chronic idiopathic constipation  Reviewed 12/8/24  Follows with medical team  Continue senna-docusate sodium dose per medical team.   No associated orders from this encounter found during lookback period of 72 hours.  GERD (gastroesophageal reflux disease)  Reviewed 12/8/24  Follows with medical  Continue famotidine 20 mg tablet BID per medical team   Continue glycopyrrolate 1 mg tablet BID AM and afternoon per medical team  Continue glycopyrrolate 2 mg tablet before bed per medical team  Continue pantoprazole EC 20 mg tablet every morning per  medical team  No associated orders from this encounter found during lookback period of 72 hours.  Medical clearance for psychiatric admission  Reviewed 12/8/24  Ongoing evaluation by medical team  No associated orders from this encounter found during lookback period of 72 hours.    Tobacco abuse  Reviewed 12/8/24  Continue to encourage cessation upon discharge  No associated orders from this encounter found during lookback period of 72 hours.  Elevated hemoglobin A1c  Reviewed 12/8/24  Follows with medical team  No associated orders from this encounter found during lookback period of 72 hours.  Class 2 obesity in adult  Reviewed 12/8/24  Follows with medical team  No associated orders from this encounter found during lookback period of 72 hours.    Primary hypertension  Reviewed 12/8/24  Follows with medical team  No associated orders from this encounter found during lookback period of 72 hours.    Treatment Recommendations/Precautions:  Continue to promote patient participation in therapeutic milieu.  Continue medical management per medicine.  Continue previously prescribed psychotropic medication regimen; see below.  Continue behavioral health checks q.15 minutes.   Continue vitals per behavioral health unit protocol.  Discharge date per primary team    SUBJECTIVE     Patient evaluated this a.m. for continuity of care. Patient was discussed at length with charge RN. Per nursing, he has been visible in the milieu. He has been cooperative with staff. Yesterday he refused breakfast and ate 75% of lunch and dinner. He attended 2/7 groups yesterday. He had a bowel movement yesterday and showered yesterday. Alberto had an in person visit with his sister and niece yesterday. He does continue to have AH - voices telling him to kill himself and his family. Today he is seen in his bedroom sleeping. He briefly awoke for encounter. He endorses some depressive and anxiety symptoms today. He denies any concerns with his  psychiatric medications. No acute distress is noted throughout evaluation. Alberto Berumen denies suicidal/homicidal ideation in addition to thoughts of self-injury, receptive to crisis planning provided by this writer, chuckie for safety in the inpatient setting.  PSYCHIATRIC REVIEW OF SYSTEMS     Behavior over the last 24 hours:  unchanged  Sleep: normal  Appetite: normal  Medication side effects: No    REVIEW OF SYSTEMS   Review of systems: no complaints    OBJECTIVE     Vital Signs in Past 24 Hours:  Temp:  [95.1 °F (35.1 °C)-97.6 °F (36.4 °C)] 95.1 °F (35.1 °C)  HR:  [] 78  BP: (112-154)/(60-82) 112/75  Resp:  [18] 18  SpO2:  [100 %] 100 %  O2 Device: None (Room air)    Intake/Output in Past 24 hours:  No intake/output data recorded.  No intake/output data recorded.        Laboratory Results:  I have personally reviewed all pertinent laboratory/tests results.  Most Recent Labs:   Lab Results   Component Value Date    WBC 8.88 11/26/2024    RBC 5.54 11/26/2024    HGB 13.0 11/26/2024    HCT 43.4 11/26/2024     11/26/2024    RDW 14.4 11/26/2024    NEUTROABS 4.71 11/26/2024    SODIUM 140 10/16/2024    K 3.8 10/16/2024     10/16/2024    CO2 29 10/16/2024    BUN 9 10/16/2024    CREATININE 0.91 10/16/2024    GLUC 94 10/16/2024    GLUF 94 10/16/2024    CALCIUM 9.5 10/16/2024    AST 26 09/30/2024    ALT 42 09/30/2024    ALKPHOS 64 09/30/2024    TP 6.6 09/30/2024    ALB 3.8 09/30/2024    TBILI 0.47 09/30/2024    CHOLESTEROL 156 03/14/2024    HDL 44 03/14/2024    TRIG 133 03/14/2024    LDLCALC 85 03/14/2024    NONHDLC 112 03/14/2024    LITHIUM 0.61 01/09/2024    UZE3FMTPNBAL 1.062 11/15/2023    HGBA1C 5.0 04/01/2024    EAG 97 04/01/2024       Behavioral Health Medications: all current active meds have been reviewed and current meds:   Current Facility-Administered Medications:     acetaminophen (TYLENOL) tablet 650 mg, Q4H PRN    acetaminophen (TYLENOL) tablet 650 mg, Q6H PRN    aluminum-magnesium  hydroxide-simethicone (MAALOX) oral suspension 30 mL, Q4H PRN    amLODIPine (NORVASC) tablet 5 mg, Daily    ARIPiprazole (ABILIFY) tablet 30 mg, Daily    Artificial Tears ophthalmic solution 1 drop, Q3H PRN    atropine (ISOPTO ATROPINE) 1 % ophthalmic solution 1 drop, HS    haloperidol lactate (HALDOL) injection 2.5 mg, Q4H PRN Max 4/day **AND** LORazepam (ATIVAN) injection 1 mg, Q4H PRN Max 4/day **AND** benztropine (COGENTIN) injection 0.5 mg, Q4H PRN Max 4/day    benztropine (COGENTIN) injection 1 mg, Q4H PRN Max 6/day    haloperidol lactate (HALDOL) injection 5 mg, Q4H PRN Max 4/day **AND** LORazepam (ATIVAN) injection 2 mg, Q4H PRN Max 4/day **AND** benztropine (COGENTIN) injection 1 mg, Q4H PRN Max 4/day    benztropine (COGENTIN) tablet 1 mg, Q4H PRN Max 6/day    benztropine (COGENTIN) tablet 1 mg, Q4H PRN Max 6/day    bisacodyl (DULCOLAX) rectal suppository 10 mg, Daily PRN    calcium carbonate (TUMS) chewable tablet 500 mg, BID PRN    cloZAPine (CLOZARIL) tablet 225 mg, HS    hydrOXYzine HCL (ATARAX) tablet 50 mg, Q6H PRN Max 4/day **OR** diphenhydrAMINE (BENADRYL) injection 50 mg, Q6H PRN    hydrOXYzine HCL (ATARAX) tablet 50 mg, Q6H PRN Max 4/day **OR** diphenhydrAMINE (BENADRYL) injection 50 mg, Q6H PRN    diphenhydrAMINE-zinc acetate (BENADRYL) 2-0.1 % cream, BID PRN    docusate sodium (COLACE) capsule 100 mg, BID    escitalopram (LEXAPRO) tablet 20 mg, Daily    famotidine (PEPCID) tablet 20 mg, BID PRN    fluticasone (FLONASE) 50 mcg/act nasal spray 1 spray, Daily PRN    glycopyrrolate (ROBINUL) tablet 1 mg, BID (AM & Afternoon)    glycopyrrolate (ROBINUL) tablet 2 mg, HS    haloperidol (HALDOL) tablet 1 mg, Q6H PRN    haloperidol (HALDOL) tablet 2.5 mg, Q4H PRN Max 4/day    haloperidol (HALDOL) tablet 5 mg, Q4H PRN Max 4/day    hydroCHLOROthiazide tablet 12.5 mg, Daily    hydrocortisone 1 % ointment, 4x Daily PRN    hydrOXYzine HCL (ATARAX) tablet 100 mg, Q6H PRN Max 4/day **OR** LORazepam (ATIVAN)  injection 2 mg, Q6H PRN    hydrOXYzine HCL (ATARAX) tablet 100 mg, Q6H PRN Max 4/day **OR** LORazepam (ATIVAN) injection 2 mg, Q6H PRN    hydrOXYzine HCL (ATARAX) tablet 25 mg, Q6H PRN Max 4/day    ibuprofen (MOTRIN) tablet 600 mg, Q8H PRN    influenza vaccine (PF) (Fluarix) IM injection 0.5 mL, Once    loratadine (CLARITIN) tablet 10 mg, Daily    magnesium hydroxide (MILK OF MAGNESIA) oral suspension 30 mL, Once    melatonin tablet 3 mg, HS PRN    melatonin tablet 9 mg, HS    methocarbamol (ROBAXIN) tablet 500 mg, Q6H PRN    multivitamin-minerals (CENTRUM) tablet 1 tablet, Daily    nicotine polacrilex (NICORETTE) gum 2 mg, Q4H PRN    OLANZapine (ZyPREXA) tablet 5 mg, Q4H PRN Max 3/day **OR** OLANZapine (ZyPREXA) IM injection 2.5 mg, Q4H PRN Max 3/day    OLANZapine (ZyPREXA) tablet 5 mg, Q3H PRN Max 3/day **OR** OLANZapine (ZyPREXA) IM injection 5 mg, Q3H PRN Max 3/day    OLANZapine (ZyPREXA) tablet 2.5 mg, Q4H PRN Max 6/day    ondansetron (ZOFRAN-ODT) dispersible tablet 4 mg, Q6H PRN    pantoprazole (PROTONIX) EC tablet 20 mg, QAM    polyethylene glycol (MIRALAX) packet 17 g, Daily PRN    polyethylene glycol (MIRALAX) packet 17 g, Daily    propranolol (INDERAL) tablet 10 mg, Q12H KAYLIE    senna-docusate sodium (SENOKOT S) 8.6-50 mg per tablet 1 tablet, Daily PRN    senna-docusate sodium (SENOKOT S) 8.6-50 mg per tablet 2 tablet, HS    traZODone (DESYREL) tablet 50 mg, HS PRN    white petrolatum-mineral oil (EUCERIN,HYDROCERIN) cream, TID PRN.    Risks, benefits and possible side effects of Medications:   Risks, benefits, and possible side effects of medications explained to patient and patient verbalizes understanding.          Mental Status Evaluation:  Appearance:  age appropriate, casually dressed, and appears stated age   Behavior:  Slightly guarded, intermittent eye contact    Speech:  normal volume and normal rate   Mood:  anxious and dysphoric   Affect:  constricted and mood-congruent   Language Coherent     Thought Process:  goal directed and logical   Thought Content:  Mild paranoia    Perceptual Disturbances: Auditory hallucinations telling him to kill himself and his family, does not appear to be responding to internal stimuli   Risk Potential: Suicidal Ideations none, Homicidal Ideations none, and Potential for Aggression No   Sensorium:  person, place, and time/date   Cognition:  recent and remote memory grossly intact   Consciousness:  alert and awake    Attention: attention span and concentration were age appropriate   Insight:  fair   Judgment: fair   Intellect Not assessed   Gait/Station: Not assessed- laying in bed   Motor Activity: no abnormal movements     Memory: Short and long term memory  intact      Progress Toward Goals: unchanged, as evidenced by their participation (or lack thereof) in individual, social and therapeutic milieu in addition to adherence to their medication regimen.    Suicide/Homicide Risk Assessment:  Risk of Harm to Self:   Nursing Suicide Risk Assessment Last 24 hours: C-SSRS Risk (Since Last Contact)  Calculated C-SSRS Risk Score (Since Last Contact): No Risk Indicated    Risk of Harm to Others:  Nursing Homicide Risk Assessment: Violence Risk to Others: Denies within past 6 months      Recommended Treatment:   See above for assessment and plan.      Counseling/Coordination of Care    I have expended greater than 15 minutes in which more than 50% of this time was expended in counseling/coordination of patient care relating to diagnostic results, prognosis, potential risks and benefits of management options, instructions for appropriate management, patient and/or collateral education, importance of adherence to management and/or risk factor reductions. Patient's rights, confidentiality, exceptions to confidentiality, use of electronic medical record including appropriate staff access to medical record regarding behavioral health services and consent to treatment were  reviewed.    Note Share:     This note was not shared with the patient due to reasonable likelihood of causing patient harm     This note has been constructed using a voice recognition system. There may be translation, syntax,  or grammatical errors. If you have any questions, please contact the dictating provider.    Tracy Hernandez PA-C 12/08/24

## 2024-12-08 NOTE — ASSESSMENT & PLAN NOTE
Reviewed 12/8/24  Follows with medical team  No associated orders from this encounter found during lookback period of 72 hours.

## 2024-12-08 NOTE — ASSESSMENT & PLAN NOTE
Reviewed 12/8/24  Ongoing evaluation by medical team  No associated orders from this encounter found during lookback period of 72 hours.

## 2024-12-09 PROCEDURE — 99232 SBSQ HOSP IP/OBS MODERATE 35: CPT | Performed by: PSYCHIATRY & NEUROLOGY

## 2024-12-09 RX ADMIN — NICOTINE POLACRILEX 2 MG: 2 GUM, CHEWING ORAL at 12:57

## 2024-12-09 RX ADMIN — PROPRANOLOL HYDROCHLORIDE 10 MG: 10 TABLET ORAL at 21:05

## 2024-12-09 RX ADMIN — PROPRANOLOL HYDROCHLORIDE 10 MG: 10 TABLET ORAL at 08:42

## 2024-12-09 RX ADMIN — PANTOPRAZOLE SODIUM 20 MG: 20 TABLET, DELAYED RELEASE ORAL at 08:42

## 2024-12-09 RX ADMIN — ESCITALOPRAM OXALATE 20 MG: 10 TABLET, FILM COATED ORAL at 08:41

## 2024-12-09 RX ADMIN — HYDROCHLOROTHIAZIDE 12.5 MG: 12.5 TABLET ORAL at 08:42

## 2024-12-09 RX ADMIN — MELATONIN TAB 3 MG 9 MG: 3 TAB at 21:04

## 2024-12-09 RX ADMIN — CLOZAPINE 225 MG: 25 TABLET ORAL at 21:04

## 2024-12-09 RX ADMIN — SENNOSIDES AND DOCUSATE SODIUM 2 TABLET: 50; 8.6 TABLET ORAL at 21:04

## 2024-12-09 RX ADMIN — NICOTINE POLACRILEX 2 MG: 2 GUM, CHEWING ORAL at 08:43

## 2024-12-09 RX ADMIN — AMLODIPINE BESYLATE 5 MG: 5 TABLET ORAL at 08:41

## 2024-12-09 RX ADMIN — NICOTINE POLACRILEX 2 MG: 2 GUM, CHEWING ORAL at 21:06

## 2024-12-09 RX ADMIN — GLYCOPYRROLATE 1 MG: 1 TABLET ORAL at 08:42

## 2024-12-09 RX ADMIN — ARIPIPRAZOLE 30 MG: 15 TABLET ORAL at 08:43

## 2024-12-09 RX ADMIN — ATROPINE SULFATE 1 DROP: 10 SOLUTION/ DROPS OPHTHALMIC at 21:03

## 2024-12-09 RX ADMIN — GLYCOPYRROLATE 1 MG: 1 TABLET ORAL at 13:03

## 2024-12-09 RX ADMIN — MULTIPLE VITAMINS W/ MINERALS TAB 1 TABLET: TAB ORAL at 08:41

## 2024-12-09 RX ADMIN — DOCUSATE SODIUM 100 MG: 100 CAPSULE, LIQUID FILLED ORAL at 08:44

## 2024-12-09 RX ADMIN — DOCUSATE SODIUM 100 MG: 100 CAPSULE, LIQUID FILLED ORAL at 17:09

## 2024-12-09 RX ADMIN — LORATADINE 10 MG: 10 TABLET ORAL at 08:41

## 2024-12-09 RX ADMIN — NICOTINE POLACRILEX 2 MG: 2 GUM, CHEWING ORAL at 17:09

## 2024-12-09 RX ADMIN — GLYCOPYRROLATE 2 MG: 1 TABLET ORAL at 21:04

## 2024-12-09 NOTE — ASSESSMENT & PLAN NOTE
Reviewed 12/9/24  Follows with medical team  No associated orders from this encounter found during lookback period of 72 hours.

## 2024-12-09 NOTE — ASSESSMENT & PLAN NOTE
Reviewed 12/9/24  Follows with medical  Continue famotidine 20 mg tablet BID per medical team   Continue glycopyrrolate 1 mg tablet BID AM and afternoon per medical team  Continue glycopyrrolate 2 mg tablet before bed per medical team  Continue pantoprazole EC 20 mg tablet every morning per medical team  No associated orders from this encounter found during lookback period of 72 hours.

## 2024-12-09 NOTE — PROGRESS NOTES
12/09/24 0803   Team Meeting   Meeting Type Daily Rounds   Team Members Present   Team Members Present Physician;Nurse;;Other (Discipline and Name)   Physician Team Member Deysi VALDEZ    Nursing Team Member Mohsen   Social Work Team Member Jose J ORTEGA   Patient/Family Present   Patient Present No   Patient's Family Present No     Groups Participation  9/10.   Patient's compliant with medications, he's engage din his treatment. He socializes with his peers. D/C to home with [facility], [facility].

## 2024-12-09 NOTE — ASSESSMENT & PLAN NOTE
Reviewed 12/9/24  Ongoing evaluation by medical team  No associated orders from this encounter found during lookback period of 72 hours.

## 2024-12-09 NOTE — PLAN OF CARE
Problem: Alteration in Thoughts and Perception  Goal: Refrain from acting on delusional thinking/internal stimuli  Description: Interventions:  - Monitor patient closely, per order   - Utilize least restrictive measures   - Set reasonable limits, give positive feedback for acceptable   - Administer medications as ordered and monitor of potential side effects  Outcome: Progressing  Goal: Agree to be compliant with medication regime, as prescribed and report medication side effects  Description: Interventions:  - Offer appropriate PRN medication and supervise ingestion; conduct AIMS, as needed   Outcome: Progressing  Goal: Complete daily ADLs, including personal hygiene independently, as able  Description: Interventions:  - Observe, teach, and assist patient with ADLS  - Monitor and promote a balance of rest/activity, with adequate nutrition and elimination   Outcome: Progressing     Problem: Depression  Goal: Refrain from harming self  Description: Interventions:  - Monitor patient closely, per order   - Supervise medication ingestion, monitor effects and side effects   Outcome: Progressing  Goal: Refrain from isolation  Description: Interventions:  - Develop a trusting relationship   - Encourage socialization   Outcome: Progressing  Goal: Refrain from self-neglect  Outcome: Progressing     Problem: Alteration in Orientation  Goal: Interact with staff daily  Description: Interventions:  - Assess and re-assess patient's level of orientation  - Engage patient in 1 on 1 interactions, daily, for a minimum of 15 minutes   - Establish rapport/trust with patient   Outcome: Progressing  Goal: Express concerns related to confused thinking related to:  Description: Interventions:  - Encourage patient to express feelings, fears, frustrations, hopes  - Assign consistent caregivers   - Casco/re-orient patient as needed  - Allow comfort items, as appropriate  - Provide visual cues, signs, etc.   Outcome: Progressing  Goal:  Allow medical examinations, as recommended  Description: Interventions:  - Provide physical/neurological exams and/or referrals, per provider   Outcome: Progressing  Goal: Cooperate with recommended testing/procedures  Description: Interventions:  - Determine need for ancillary testing  - Observe for mental status changes  - Implement falls/precaution protocol   Outcome: Progressing  Goal: Complete daily ADLs, including personal hygiene independently, as able  Description: Interventions:  - Observe, teach, and assist patient with ADLS  Outcome: Progressing

## 2024-12-09 NOTE — NURSING NOTE
Alberto maintained on ongoing SAFE precaution without incident on this shift. Awake, alert,pleasant and cooperative.   Attended and participated in 4 out of 6 groups today. Continues to be compliant with medication regimen,. Admit continues to have he same auditory hallucination about someone is going to hurt him and his family. He is coping with the voices, that is holding him back from becoming the man he wants to be.  He talks to his peers and share his struggle with some of the staff he trust.  Continues to denies  any symtoms.

## 2024-12-09 NOTE — ASSESSMENT & PLAN NOTE
Reviewed 12/9/24  Follows with medical team  Continue senna-docusate sodium dose per medical team.   No associated orders from this encounter found during lookback period of 72 hours.

## 2024-12-09 NOTE — PROGRESS NOTES
12/09/24 1027   Team Meeting   Meeting Type Tx Team Meeting   Initial Conference Date 12/09/24   Next Conference Date 12/23/24   Team Members Present   Team Members Present Physician;Nurse;;Other (Discipline and Name)   Physician Team Member Holter   Nursing Team Member Mohsen   Social Work Team Member Jose J ORTEGA   Other (Discipline and Name) Yadira [facility], Gaby [facility]   Patient/Family Present   Patient Present Yes   Patient's Family Present No   OTHER   Team Meeting - Additional Comments Patient attened his treatment team meeting. Patient did not complete his self assessment. Patient reports continued AH, he's using his coping skills to manage the symptoms. This writer discussed his ACT referral and provided an update, which both ACT trams are not expecting [facility] funding openings until January/February. This writer encouraged the [facility] to prioritize the patient's case for [facility] funding for ACT services.

## 2024-12-09 NOTE — PLAN OF CARE
Problem: Ineffective Coping  Goal: Identifies ineffective coping skills  Outcome: Progressing  Goal: Identifies healthy coping skills  Outcome: Progressing  Goal: Demonstrates healthy coping skills  Outcome: Progressing  Goal: Participates in unit activities  Description: Interventions:  - Provide therapeutic environment   - Provide required programming   - Redirect inappropriate behaviors   Outcome: Progressing    He continues to attend and actively engage in groups.

## 2024-12-09 NOTE — PROGRESS NOTES
"Progress Note - Behavioral Health   Name: Alberto Berumen 29 y.o. male I MRN: [id number]  Unit/Bed#: [facility] 112-02 I Date of Admission: 3/29/2024   Date of Service: 12/9/2024 I Hospital Day: 255     Assessment & Plan  Schizoaffective disorder, bipolar type (HCC)    Reviewed on 12/9/2024.    Patient reports feelings of depression and anxiety, racing thoughts, hopelessness, low self-esteem, low motivation, and low energy. He states the feelings of hopelessness have improved a little. He states he experiences mood swings \"sometimes but rarely\", and denies any other the weekend. He states he slept \"well\" last night. He states his mood is \"depressed\". He rates his depression as 5/10 this morning which is slightly improved. He states he is depressed and anxious due to the voices in his head constantly telling him they are going to kill him and his family and that he is going to hell. He states his anxiety is 4/10 this morning which is slightly improved. He states the voices are random people, some friends voices he recognizes, and never his own voice. He states he has been hearing the voices constantly for the past 2-3 years. He states the voices have not changed or said anything new recently. He states working out using the punching bag, listening to music and talking with peers helps him cope with the voices. He states he used the punching bag on Saturday and plans to use it again today. He denies any visual hallucinations. He reports persecutory delusions regarding the threatening voices he is hearing but states he feels safe here. He states he believes his family is safe and speaking to them on the phone gives him reassurance. He denies grandiose, somatic or bizarre delusions. He reports passive death wishes \"sometimes\" but denies having them currently. He denies thoughts to harm self or others, SI, or HI.        Continue Clozapine 225 mg daily at bedtime for psychosis -- to consider further careful titration for Sx's " depending on response  Continue to obtain CBC with diff, CRP, and CK (weekly on Tues), and a Clozapine level and BNP (every 2 weeks on Tuesdays). ANC 4.71 11/26/24    Continue Abilify 30 mg once daily for psychosis  Continue Escitalopram 20 mg once daily for depression and anxiety  Continue Senna-Docusate sodium one 50 mg tablet daily before bed for constipation  - Decreased from two tablets daily on 11/3 due to reported loose stools  Continue Propanolol 10 mg twice daily for anxiety   Continue Amlodipine 5 mg PO daily     Pt remains on dual antipsychotic Tx due to failure on monotherapy      Dispo as per primary team: ACT team referral was made  No associated orders from this encounter found during lookback period of 72 hours.    Chronic idiopathic constipation  Reviewed 12/9/24  Follows with medical team  Continue senna-docusate sodium dose per medical team.   No associated orders from this encounter found during lookback period of 72 hours.  GERD (gastroesophageal reflux disease)  Reviewed 12/9/24  Follows with medical  Continue famotidine 20 mg tablet BID per medical team   Continue glycopyrrolate 1 mg tablet BID AM and afternoon per medical team  Continue glycopyrrolate 2 mg tablet before bed per medical team  Continue pantoprazole EC 20 mg tablet every morning per medical team  No associated orders from this encounter found during lookback period of 72 hours.  Medical clearance for psychiatric admission  Reviewed 12/9/24  Ongoing evaluation by medical team  No associated orders from this encounter found during lookback period of 72 hours.    Tobacco abuse  Reviewed 12/9/24  Continue to encourage cessation upon discharge  No associated orders from this encounter found during lookback period of 72 hours.  Elevated hemoglobin A1c  Reviewed 12/9/24  Follows with medical team  No associated orders from this encounter found during lookback period of 72 hours.  Class 2 obesity in adult  Reviewed 12/9/24  Follows with  medical team  No associated orders from this encounter found during lookback period of 72 hours.    Primary hypertension  Reviewed 12/9/24  Follows with medical team  No associated orders from this encounter found during lookback period of 72 hours.    Psychiatry Progress Note [facility]    Progress towards goals: Progressing    Review of systems: Unremarkable    Psychiatric Diagnosis: Schizoaffective disorder, bipolar type    Assessment  Overall Status:  Status quo- continues to feel depressed and anxious regarding voices he hears but trying to find ways to cope with them and stay positive  Certification Statement: The patient will continue to require additional inpatient hospital stay due to schizoaffective disorder, bipolar type and related symptoms       Medications: as above  Side effects from treatment: Denied  Medication changes: as above  Medication education: Risks side effects benefits and precautions of medications discussed with patient and they did verbalize an understanding about risks for metabolic syndrome from being on neuroleptics and tardive dyskinesia etc.  All medications reviewed and I recommend that they be continued for symptom management  Understanding of medications: Risks side effects benefits and precautions of medications discussed with patient and they did verbalize an understanding about risks for metabolic syndrome from being on neuroleptics and tardive dyskinesia etc., and patient appeared to have understanding.  Justification for dual anti-psychotics: Due to failure on monotherapy    Non-pharmacological treatments  Continue with individual, group, milieu and occupational therapy using recovery principles and psycho-education about accepting illness and the need for treatment.  Behavioral health checks every 7 minutes    Safety  Safety and communication plan established to target dynamic risk factors discussed above.    Discharge Plan   To live with aunt once  "[facility] approves funding on ACT team- ACT team most likely not available until January or February    Interval Progress: Per treatment team, patient is compliant with meals and medications He is cooperative, pleasant, and visible on unit. He did not require any PRN psychotropic medications yesterday or this morning. His sister and niece visited over the weekend. He used to punching bag Saturday to help cope with voices. He attended 9/10 groups on Friday.     Today, patient evaluated by this writer and Dr. Holter in [facility]. He states his mood is \"depressed\". He states he is depressed and has anxiety about the voices he constantly hears telling him they are going to kill him and his family and that he is going to hell. He rates his depression as 5/10 this morning which is slightly improved. He rates his anxiety as 4/10 this morning which is slightly improved. He states the voices have been constant for the past 2-3 years and he has been trying to find positive ways to cope with the voices through using the punching bag, listening to music, and walking/talking with peers. He states he used to punching bag Saturday which helped. He denies any visual hallucinations. He reports persecutory delusions regarding the threatening voices but states he feels safe here on the unit and believes his family is safe. He states talking to them daily on the phone gives him reassurance that they are safe. He reports feelings of anxiety, depression, hopelessness, racing thoughts, low motivation, low energy, and low self-esteem. He states he has mood swings \"rarely\" and did not have any over the weekend. He reports passive death wishes \"sometimes\" but denies any currently or this morning. He denies any thoughts to hurt self or others, SI or HI. He states he slept \"well\" last night. He states he is eating well and has a good appetite but he is trying to eat healthier and work out to lose weight. He states he had a visit with his sister and " "niece over the weekend that went well. He states one of the other patient's family member prayed for him over the phone yesterday which gave him strength. He denies any medication side effects.     Acceptance by patient: Accepting of medications and treatments  Hopefulness in recovery: Hopeful to live with aunt upon discharge  Involved in reintegration process: Patient communication with aunt and sister on phone, had in- person visit on unit this weekend with sister and niece.   Trusting in relationship with psychiatrist: Present  Sleep: Good  Appetite: Good  Compliance with Medications: Compliant  Group attendance: 9/10 groups on Friday  Significant events: None in the past 24 hours    Mental Status Exam  Appearance: age appropriate, casually dressed, dressed appropriately, adequate grooming  Behavior: cooperative, calm, good  eye contact  Speech: normal rate, normal volume, normal pitch, not pressured  Mood: \"depressed\"  Affect: constricted, mood-congruent  Thought Process: organized, logical, coherent, goal directed, linear  Thought Content: Mild paranoia regarding threatening voices he hears  Perceptual Disturbances: Auditory hallucinations of voices constantly telling him they are going to kill him and his family and that he is going to hell. Denies visual hallucinations when asked. Does not appear to be responding to internal stimuli   Sensorium: alert and oriented to person, place, time and situation  Cognition: recent and remote memory grossly intact  Consciousness: alert and awake  Attention: attention span and concentration are age appropriate  Intellect: appears to be of average intelligence  Insight: fair  Judgement: fair  Motor Activity: no abnormal movements     Vitals  Temp:  [97.5 °F (36.4 °C)-97.8 °F (36.6 °C)] 97.5 °F (36.4 °C)  HR:  [] 85  Resp:  [17-18] 17  BP: (119-127)/(70-79) 120/70  SpO2:  [96 %-98 %] 98 %  No intake or output data in the 24 hours ending 12/09/24 1021    Lab Results: " All Labs For Current Hospital Admission Reviewed        Current Facility-Administered Medications   Medication Dose Route Frequency Provider Last Rate    acetaminophen  650 mg Oral Q4H PRN Jordan C Holter, DO      acetaminophen  650 mg Oral Q6H PRN HOLLI Lion      aluminum-magnesium hydroxide-simethicone  30 mL Oral Q4H PRN Jordan C Holter, DO      amLODIPine  5 mg Oral Daily HOLLI Lion      ARIPiprazole  30 mg Oral Daily Bora Rosario MD      Artificial Tears  1 drop Both Eyes Q3H PRN Jordan C Holter, DO      atropine  1 drop Sublingual HS Harjeet BalderramaDO jose carlos      haloperidol lactate  2.5 mg Intramuscular Q4H PRN Max 4/day HOLLI Lion      And    LORazepam  1 mg Intramuscular Q4H PRN Max 4/day HOLLI Lion      And    benztropine  0.5 mg Intramuscular Q4H PRN Max 4/day HOLLI Lion      benztropine  1 mg Intramuscular Q4H PRN Max 6/day Jordan C Holter, DO      haloperidol lactate  5 mg Intramuscular Q4H PRN Max 4/day HOLLI Lion      And    LORazepam  2 mg Intramuscular Q4H PRN Max 4/day HOLLI Lion      And    benztropine  1 mg Intramuscular Q4H PRN Max 4/day HOLLI Lion      benztropine  1 mg Oral Q4H PRN Max 6/day HOLLI Lion      benztropine  1 mg Oral Q4H PRN Max 6/day Jordan C Holter, DO      bisacodyl  10 mg Rectal Daily PRN HOLLI Lion      calcium carbonate  500 mg Oral BID PRN HOLLI Monge      cloZAPine  225 mg Oral HS Hardik So MD      hydrOXYzine HCL  50 mg Oral Q6H PRN Max 4/day Jordan C Holter, DO      Or    diphenhydrAMINE  50 mg Intramuscular Q6H PRN Jordan C Holter, DO      hydrOXYzine HCL  50 mg Oral Q6H PRN Max 4/day HOLLI Lion      Or    diphenhydrAMINE  50 mg Intramuscular Q6H PRN HOLLI Lion      diphenhydrAMINE-zinc acetate   Topical BID PRN HOLLI Lion      docusate sodium  100 mg Oral BID Jourdan Dickens DO      escitalopram  20 mg Oral Daily HOLLI Lion       famotidine  20 mg Oral BID PRN HOLLI Monge      fluticasone  1 spray Each Nare Daily PRN HOLLI Monge      glycopyrrolate  1 mg Oral BID (AM & Afternoon) Bora Rosario MD      glycopyrrolate  2 mg Oral HS Bora Rosario MD      haloperidol  1 mg Oral Q6H PRN HOLLI Lion      haloperidol  2.5 mg Oral Q4H PRN Max 4/day HOLLI Lion      haloperidol  5 mg Oral Q4H PRN Max 4/day HOLLI Lion      hydroCHLOROthiazide  12.5 mg Oral Daily Pia Mantilla, HOLLI      hydrocortisone   Topical 4x Daily PRN HOLLI Lion      hydrOXYzine HCL  100 mg Oral Q6H PRN Max 4/day Ion C Holter, DO      Or    LORazepam  2 mg Intramuscular Q6H PRN Ion C Holter, DO      hydrOXYzine HCL  100 mg Oral Q6H PRN Max 4/day HOLLI Lion      Or    LORazepam  2 mg Intramuscular Q6H PRN HOLLI Lion      hydrOXYzine HCL  25 mg Oral Q6H PRN Max 4/day Ion C Holter, DO      ibuprofen  600 mg Oral Q8H PRN HOLLI Lion      influenza vaccine  0.5 mL Intramuscular Once Bora Rosario MD      loratadine  10 mg Oral Daily Brina Guillen MD      magnesium hydroxide  30 mL Oral Once Jourdan Dickens, DO      melatonin  3 mg Oral HS PRN Bora Rosario MD      melatonin  9 mg Oral HS Bora Rosario MD      methocarbamol  500 mg Oral Q6H PRN HOLLI Lion      multivitamin-minerals  1 tablet Oral Daily HOLLI Monge      nicotine polacrilex  2 mg Oral Q4H PRN Bora Rosario MD      OLANZapine  5 mg Oral Q4H PRN Max 3/day Ion C Holter, DO      Or    OLANZapine  2.5 mg Intramuscular Q4H PRN Max 3/day Ion C Holter, DO      OLANZapine  5 mg Oral Q3H PRN Max 3/day Ion C Holter, DO      Or    OLANZapine  5 mg Intramuscular Q3H PRN Max 3/day Ion C Holter, DO      OLANZapine  2.5 mg Oral Q4H PRN Max 6/day Ion C Holter, DO      ondansetron  4 mg Oral Q6H PRN HOLLI Lion      pantoprazole  20 mg Oral QAM Jordan C Holter, DO      polyethylene  glycol  17 g Oral Daily PRN HOLLI Ghotra      polyethylene glycol  17 g Oral Daily Jourdan Dickens, DO      propranolol  10 mg Oral Q12H KAYLIE HOLLI Lion      senna-docusate sodium  1 tablet Oral Daily PRN Jordan C Holter, DO      senna-docusate sodium  2 tablet Oral HS Jourdan Dickens, DO      traZODone  50 mg Oral HS PRN HOLLI Lion      white petrolatum-mineral oil   Topical TID PRN HOLLI Lion         Counseling / Coordination of Care: Total floor / unit time spent today 15 minutes. Greater than 50% of total time was spent with the patient and / or family counseling and / or somewhat receptive to supportive listening and teaching positive coping skills to deal with symptom mangement.     Patient's Rights, confidentiality and exceptions to confidentiality, use of automated medical record, Behavioral Health Services staff access to medical record, and consent to treatment reviewed.    This note has been dictated and hence there may be problems with punctuation, spelling and formatting and if anyone has any concerns please address them to Dr. Mora.  This note is not shared with patient due to potential for making patient's condition worse by knowing the content of the note.    RODRIGO Flores

## 2024-12-09 NOTE — PROGRESS NOTES
12/09/24 1022   Team Meeting   Meeting Type Tx Team Meeting   Initial Conference Date 12/09/24   Next Conference Date 01/09/25   Team Members Present   Team Members Present Physician;Nurse;   Physician Team Member Holter   Nursing Team Member Mohsen   Social Work Team Member Jose J ORTEGA   Patient/Family Present   Patient Present Yes   Patient's Family Present No     Treatment team reviewed patient's updated treatment plan. Patient signed updated treatment plan.

## 2024-12-09 NOTE — ASSESSMENT & PLAN NOTE
Reviewed 12/9/24  Continue to encourage cessation upon discharge  No associated orders from this encounter found during lookback period of 72 hours.

## 2024-12-09 NOTE — ASSESSMENT & PLAN NOTE
"  Reviewed on 12/9/2024.    Patient reports feelings of depression and anxiety, racing thoughts, hopelessness, low self-esteem, low motivation, and low energy. He states the feelings of hopelessness have improved a little. He states he experiences mood swings \"sometimes but rarely\", and denies any other the weekend. He states he slept \"well\" last night. He states his mood is \"depressed\". He rates his depression as 5/10 this morning which is slightly improved. He states he is depressed and anxious due to the voices in his head constantly telling him they are going to kill him and his family and that he is going to hell. He states his anxiety is 4/10 this morning which is slightly improved. He states the voices are random people, some friends voices he recognizes, and never his own voice. He states he has been hearing the voices constantly for the past 2-3 years. He states the voices have not changed or said anything new recently. He states working out using the punching bag, listening to music and talking with peers helps him cope with the voices. He states he used the punching bag on Saturday and plans to use it again today. He denies any visual hallucinations. He reports persecutory delusions regarding the threatening voices he is hearing but states he feels safe here. He states he believes his family is safe and speaking to them on the phone gives him reassurance. He denies grandiose, somatic or bizarre delusions. He reports passive death wishes \"sometimes\" but denies having them currently. He denies thoughts to harm self or others, SI, or HI.        Continue Clozapine 225 mg daily at bedtime for psychosis -- to consider further careful titration for Sx's depending on response  Continue to obtain CBC with diff, CRP, and CK (weekly on Tues), and a Clozapine level and BNP (every 2 weeks on Tuesdays). ANC 4.71 11/26/24    Continue Abilify 30 mg once daily for psychosis  Continue Escitalopram 20 mg once daily for " depression and anxiety  Continue Senna-Docusate sodium one 50 mg tablet daily before bed for constipation  - Decreased from two tablets daily on 11/3 due to reported loose stools  Continue Propanolol 10 mg twice daily for anxiety   Continue Amlodipine 5 mg PO daily     Pt remains on dual antipsychotic Tx due to failure on monotherapy      Dispo as per primary team: ACT team referral was made  No associated orders from this encounter found during lookback period of 72 hours.

## 2024-12-10 LAB
BASOPHILS # BLD AUTO: 0.05 THOUSANDS/ÂΜL (ref 0–0.1)
BASOPHILS NFR BLD AUTO: 1 % (ref 0–1)
EOSINOPHIL # BLD AUTO: 0.29 THOUSAND/ÂΜL (ref 0–0.61)
EOSINOPHIL NFR BLD AUTO: 4 % (ref 0–6)
ERYTHROCYTE [DISTWIDTH] IN BLOOD BY AUTOMATED COUNT: 14.3 % (ref 11.6–15.1)
HCT VFR BLD AUTO: 40.6 % (ref 36.5–49.3)
HGB BLD-MCNC: 12.6 G/DL (ref 12–17)
IMM GRANULOCYTES # BLD AUTO: 0.02 THOUSAND/UL (ref 0–0.2)
IMM GRANULOCYTES NFR BLD AUTO: 0 % (ref 0–2)
LYMPHOCYTES # BLD AUTO: 2.63 THOUSANDS/ÂΜL (ref 0.6–4.47)
LYMPHOCYTES NFR BLD AUTO: 32 % (ref 14–44)
MCH RBC QN AUTO: 24.2 PG (ref 26.8–34.3)
MCHC RBC AUTO-ENTMCNC: 31 G/DL (ref 31.4–37.4)
MCV RBC AUTO: 78 FL (ref 82–98)
MONOCYTES # BLD AUTO: 0.91 THOUSAND/ÂΜL (ref 0.17–1.22)
MONOCYTES NFR BLD AUTO: 11 % (ref 4–12)
NEUTROPHILS # BLD AUTO: 4.38 THOUSANDS/ÂΜL (ref 1.85–7.62)
NEUTS SEG NFR BLD AUTO: 52 % (ref 43–75)
NRBC BLD AUTO-RTO: 0 /100 WBCS
PLATELET # BLD AUTO: 234 THOUSANDS/UL (ref 149–390)
PMV BLD AUTO: 10.5 FL (ref 8.9–12.7)
RBC # BLD AUTO: 5.2 MILLION/UL (ref 3.88–5.62)
WBC # BLD AUTO: 8.28 THOUSAND/UL (ref 4.31–10.16)

## 2024-12-10 PROCEDURE — 85025 COMPLETE CBC W/AUTO DIFF WBC: CPT | Performed by: PSYCHIATRY & NEUROLOGY

## 2024-12-10 PROCEDURE — 99232 SBSQ HOSP IP/OBS MODERATE 35: CPT | Performed by: PSYCHIATRY & NEUROLOGY

## 2024-12-10 RX ADMIN — PANTOPRAZOLE SODIUM 20 MG: 20 TABLET, DELAYED RELEASE ORAL at 08:32

## 2024-12-10 RX ADMIN — ATROPINE SULFATE 1 DROP: 10 SOLUTION/ DROPS OPHTHALMIC at 21:21

## 2024-12-10 RX ADMIN — LORATADINE 10 MG: 10 TABLET ORAL at 08:31

## 2024-12-10 RX ADMIN — NICOTINE POLACRILEX 2 MG: 2 GUM, CHEWING ORAL at 12:46

## 2024-12-10 RX ADMIN — NICOTINE POLACRILEX 2 MG: 2 GUM, CHEWING ORAL at 08:31

## 2024-12-10 RX ADMIN — MULTIPLE VITAMINS W/ MINERALS TAB 1 TABLET: TAB ORAL at 08:32

## 2024-12-10 RX ADMIN — PROPRANOLOL HYDROCHLORIDE 10 MG: 10 TABLET ORAL at 21:21

## 2024-12-10 RX ADMIN — DOCUSATE SODIUM 100 MG: 100 CAPSULE, LIQUID FILLED ORAL at 17:10

## 2024-12-10 RX ADMIN — MELATONIN TAB 3 MG 9 MG: 3 TAB at 21:24

## 2024-12-10 RX ADMIN — PROPRANOLOL HYDROCHLORIDE 10 MG: 10 TABLET ORAL at 08:31

## 2024-12-10 RX ADMIN — DOCUSATE SODIUM 100 MG: 100 CAPSULE, LIQUID FILLED ORAL at 08:32

## 2024-12-10 RX ADMIN — HYDROCHLOROTHIAZIDE 12.5 MG: 12.5 TABLET ORAL at 08:31

## 2024-12-10 RX ADMIN — NICOTINE POLACRILEX 2 MG: 2 GUM, CHEWING ORAL at 17:10

## 2024-12-10 RX ADMIN — GLYCOPYRROLATE 1 MG: 1 TABLET ORAL at 13:03

## 2024-12-10 RX ADMIN — GLYCOPYRROLATE 2 MG: 1 TABLET ORAL at 21:23

## 2024-12-10 RX ADMIN — NICOTINE POLACRILEX 2 MG: 2 GUM, CHEWING ORAL at 21:10

## 2024-12-10 RX ADMIN — GLYCOPYRROLATE 1 MG: 1 TABLET ORAL at 08:32

## 2024-12-10 RX ADMIN — CLOZAPINE 225 MG: 25 TABLET ORAL at 21:23

## 2024-12-10 RX ADMIN — ARIPIPRAZOLE 30 MG: 15 TABLET ORAL at 08:33

## 2024-12-10 RX ADMIN — SENNOSIDES AND DOCUSATE SODIUM 2 TABLET: 50; 8.6 TABLET ORAL at 21:21

## 2024-12-10 RX ADMIN — ESCITALOPRAM OXALATE 20 MG: 10 TABLET, FILM COATED ORAL at 08:32

## 2024-12-10 NOTE — NURSING NOTE
Pt is visible on the unit and social with select peers. Consumed 100% of dinner. Took medications without incidence. Pt is pleasant and cooperative, he was participating in a bible study with his peers this evening. Attended 8/9 groups. Reports continued threatening AH, with depression and anxiety but he uses the exercise room as a coping skill, as well as talking to his peers. Denies SI/HI/VH. No behavioral issues. Pt offers no new complaints.

## 2024-12-10 NOTE — PROGRESS NOTES
"Progress Note - Behavioral Health   Name: Alberto Berumen 29 y.o. male I MRN: [id number]  Unit/Bed#: [facility] 112-02 I Date of Admission: 3/29/2024   Date of Service: 12/10/2024 I Hospital Day: 256     Assessment & Plan  Schizoaffective disorder, bipolar type (HCC)    Reviewed on 12/10/2024.    Patient reports feelings of depression and anxiety, racing thoughts, hopelessness, and low energy. He states his self-esteem and motivation is \"medium\" today. He states he experiences mood swings \"sometimes but rarely\", and denies any the past few days. He states he slept \"okay\" last night and is still tired this morning. He states his mood is \"depressed\". He rates his depression as 6/10 this morning. He states he is depressed and anxious due to the voices in his head constantly telling him they are going to kill him and his family and that he is going to hell. He states his anxiety is 3/10 this morning which is slightly improved. He states the voices are random people, some friends voices he recognizes, and never his own voice. He states he has been hearing the voices constantly for the past 2-3 years. He states the voices have not changed or said anything new recently. He states working out using the punching bag, listening to music and talking with peers helps him cope with the voices. He states he used the punching bag yesterday which helped and plans to use it again today. He denies any visual hallucinations. He reports persecutory delusions regarding the threatening voices he is hearing but states he feels safe here. When asked if he believes his family is safe he states \"I hope so\", and states that talking to them daily on the phone reassures him. He denies grandiose, somatic or bizarre delusions. He reports passive death wishes \"sometimes\" but denies having them currently or this morning. He denies thoughts to harm self or others, SI, or HI.        Continue Clozapine 225 mg daily at bedtime for psychosis -- to consider " further careful titration for Sx's depending on response  Continue to obtain CBC with diff, CRP, and CK (weekly on Tues), and a Clozapine level and BNP (every 2 weeks on Tuesdays). Today 12/10/24- WBC 8.28, Hgb 12.6, platelets 234, ANC 4.38.    Continue Abilify 30 mg once daily for psychosis  Continue Escitalopram 20 mg once daily for depression and anxiety  Continue Senna-Docusate sodium one 50 mg tablet daily before bed for constipation  - Decreased from two tablets daily on 11/3 due to reported loose stools  Continue Propanolol 10 mg twice daily for anxiety   Continue Amlodipine 5 mg PO daily     Pt remains on dual antipsychotic Tx due to failure on monotherapy      Dispo as per primary team: ACT team referral was made  No associated orders from this encounter found during lookback period of 72 hours.    Chronic idiopathic constipation  Reviewed 12/10/24  Follows with medical team  Continue senna-docusate sodium dose per medical team.   No associated orders from this encounter found during lookback period of 72 hours.  GERD (gastroesophageal reflux disease)  Reviewed 12/10/24  Follows with medical  Continue famotidine 20 mg tablet BID per medical team   Continue glycopyrrolate 1 mg tablet BID AM and afternoon per medical team  Continue glycopyrrolate 2 mg tablet before bed per medical team  Continue pantoprazole EC 20 mg tablet every morning per medical team  No associated orders from this encounter found during lookback period of 72 hours.  Medical clearance for psychiatric admission  Reviewed 12/10/24  Ongoing evaluation by medical team  No associated orders from this encounter found during lookback period of 72 hours.    Tobacco abuse  Reviewed 12/10/24  Continue to encourage cessation upon discharge  No associated orders from this encounter found during lookback period of 72 hours.  Elevated hemoglobin A1c  Reviewed 12/10/24  Follows with medical team  No associated orders from this encounter found during  lookback period of 72 hours.  Class 2 obesity in adult  Reviewed 12/10/24  Follows with medical team  No associated orders from this encounter found during lookback period of 72 hours.    Primary hypertension  Reviewed 12/10/24  Follows with medical team  No associated orders from this encounter found during lookback period of 72 hours.    Psychiatry Progress Note [facility]    Progress towards goals: Progressing     Review of systems: Unremarkable    Psychiatric Diagnosis: Schizoaffective disorder, bipolar type    Assessment  Overall Status:  Status-quo- continues to have anxiety and depression regarding the threatening voices he hears but is trying to find ways to cope with them  Certification Statement: The patient will continue to require additional inpatient hospital stay due to schizoaffective disorder, bipolar type and related symptoms       Medications: as above  Side effects from treatment: Denied  Medication changes: as above  Medication education: Risks side effects benefits and precautions of medications discussed with patient and they did verbalize an understanding about risks for metabolic syndrome from being on neuroleptics and tardive dyskinesia etc.  All medications reviewed and I recommend that they be continued for symptom management  Understanding of medications: Risks side effects benefits and precautions of medications discussed with patient and they did verbalize an understanding about risks for metabolic syndrome from being on neuroleptics and tardive dyskinesia etc., and patient appeared to have understanding.  Justification for dual anti-psychotics: Due to failure on monotherapy    Non-pharmacological treatments  Continue with individual, group, milieu and occupational therapy using recovery principles and psycho-education about accepting illness and the need for treatment.  Behavioral health checks every 7 minutes    Safety  Safety and communication plan established  "to target dynamic risk factors discussed above.    Discharge Plan   To live with aunt once [facility] approves funding on ACT team- ACT team most likely not available until January or February    Interval Progress: Per treatment team, patient is compliant with meals and medications. He is cooperative, pleasant, and visible on unit. He did not require any PRN pain medications yesterday or this morning. He used punching bag yesterday to help cope with the voices. Team reports that a peer told Alberto that praying helps get rid of auditory hallucinations so they are going to monitor that. He attended 7/9 groups yesterday.     Today, patient evaluated by this writer and Dr. Rosario at bedside. Patient states his mood is \"depressed\". He states he is depressed and anxious about the voices he constantly hears telling him they are going to kill him and his family and that he is going to hell. He rates his depression as 6/10 this morning. He rates his anxiety as 3/10 this morning which is slightly improved. He states the voices have been constant for the past 2-3 years and he has been trying to find positive ways to cope with them. He states that working out using the punching bag, listening to music, and talking with peers helps him cope. He states he used the punching bag yesterday and plans to use it today. He denies any visual hallucinations. He reports persecutory delusions regarding the threatening voices hurting him and his family. He states he feels safe here but when asked if he believed his family was safe he stated \"I hope so\". He states he is still worried about his family but talking to them on the phone daily helps reassure him they are still safe. He reports depression, anxiety, racing thoughts, hopelessness, and low energy. He states his self-esteem and motivation are \"medium\" today. He states he has mood swings \"sometimes but rarely\" and has not had one the past few days. He states he slept \"okay\" last night and " "is feeling tired this morning. He states he has a good appetite and is eating well but he is trying to eat healthier and work out more to lose weight. He denies any medication side effects.     Acceptance by patient: Accepting of medications and treatments  Hopefulness in recovery: Hopeful to live with aunt upon discharge  Involved in reintegration process: Patient communication with aunt and sister on phone, had in-person visit on unit with sister and niece this past Saturday.   Trusting in relationship with psychiatrist: Present  Sleep: Good  Appetite: Good  Compliance with Medications: Compliant  Group attendance: 7/9 groups yesterday  Significant events: None in the past 24 hours    Mental Status Exam  Appearance: age appropriate, casually dressed, dressed appropriately, adequate grooming  Behavior: cooperative, calm, good  eye contact  Speech: normal rate, normal volume, normal pitch, not pressured  Mood: \"depressed\"  Affect: constricted, mood-congruent  Thought Process: organized, logical, coherent, goal directed, linear  Thought Content: Mild paranoia regarding threatening voices he is hearing  Perceptual Disturbances: Auditory hallucinations of voices constantly telling him they are going to kill him and his family and that he is going to hell. Denies any visual hallucinations when asked. Does not appear to be responding to internal stimuli.  Sensorium: alert and oriented to person, place, time and situation  Cognition: recent and remote memory grossly intact  Consciousness: alert and awake  Attention: attention span and concentration are age appropriate  Intellect: appears to be of average intelligence  Insight: fair  Judgement: fair  Motor Activity: no abnormal movements     Vitals  Temp:  [96.1 °F (35.6 °C)] 96.1 °F (35.6 °C)  HR:  [] 84  Resp:  [15-18] 15  BP: (112-119)/(68-77) 119/77  SpO2:  [96 %-100 %] 100 %  No intake or output data in the 24 hours ending 12/10/24 0954    Lab Results: All Labs " For Current Hospital Admission Reviewed  Results from last 7 days   Lab Units 12/10/24  0914   WBC Thousand/uL 8.28   RBC Million/uL 5.20   HEMOGLOBIN g/dL 12.6   HEMATOCRIT % 40.6   MCV fL 78*   PLATELETS Thousands/uL 234   TOTAL NEUT ABS Thousands/µL 4.38       Current Facility-Administered Medications   Medication Dose Route Frequency Provider Last Rate    acetaminophen  650 mg Oral Q4H PRN Jordan C Holter,       acetaminophen  650 mg Oral Q6H PRN HOLLI Lion      aluminum-magnesium hydroxide-simethicone  30 mL Oral Q4H PRN Jordan C Holter, DO      amLODIPine  5 mg Oral Daily HOLLI Lion      ARIPiprazole  30 mg Oral Daily Bora Rosario MD      Artificial Tears  1 drop Both Eyes Q3H PRN Jordan C Holter, DO      atropine  1 drop Sublingual HS Harjeet Torres DO      haloperidol lactate  2.5 mg Intramuscular Q4H PRN Max 4/day HOLLI Lion      And    LORazepam  1 mg Intramuscular Q4H PRN Max 4/day HOLLI Lion      And    benztropine  0.5 mg Intramuscular Q4H PRN Max 4/day HOLLI Lion      benztropine  1 mg Intramuscular Q4H PRN Max 6/day Jordan C Holter, DO      haloperidol lactate  5 mg Intramuscular Q4H PRN Max 4/day HOLLI Lion      And    LORazepam  2 mg Intramuscular Q4H PRN Max 4/day HOLLI Lion      And    benztropine  1 mg Intramuscular Q4H PRN Max 4/day HOLLI Lion      benztropine  1 mg Oral Q4H PRN Max 6/day HOLLI Lion      benztropine  1 mg Oral Q4H PRN Max 6/day Jordan C Holter, DO      bisacodyl  10 mg Rectal Daily PRN HOLLI Lion      calcium carbonate  500 mg Oral BID PRN HOLLI Monge      cloZAPine  225 mg Oral HS Hardik So MD      hydrOXYzine HCL  50 mg Oral Q6H PRN Max 4/day Jordan C Holter, DO      Or    diphenhydrAMINE  50 mg Intramuscular Q6H PRN Jordan C Holter, DO      hydrOXYzine HCL  50 mg Oral Q6H PRN Max 4/day Eveline Hangey, CRNP      Or    diphenhydrAMINE  50 mg Intramuscular Q6H PRN  HOLLI Lion      diphenhydrAMINE-zinc acetate   Topical BID PRN HOLLI Lion      docusate sodium  100 mg Oral BID Jourdan Dickens, DO      escitalopram  20 mg Oral Daily HOLLI Lion      famotidine  20 mg Oral BID PRN HOLLI Monge      fluticasone  1 spray Each Nare Daily PRN HOLLI Monge      glycopyrrolate  1 mg Oral BID (AM & Afternoon) Bora Rosario MD      glycopyrrolate  2 mg Oral HS Bora Rosario MD      haloperidol  1 mg Oral Q6H PRN HOLLI Lion      haloperidol  2.5 mg Oral Q4H PRN Max 4/day HOLLI Lion      haloperidol  5 mg Oral Q4H PRN Max 4/day HOLLI Lion      hydroCHLOROthiazide  12.5 mg Oral Daily HOLLI Galvan      hydrocortisone   Topical 4x Daily PRN HOLLI Lion      hydrOXYzine HCL  100 mg Oral Q6H PRN Max 4/day Ion C Holter, DO      Or    LORazepam  2 mg Intramuscular Q6H PRN Ion C Holter, DO      hydrOXYzine HCL  100 mg Oral Q6H PRN Max 4/day HOLLI Lion      Or    LORazepam  2 mg Intramuscular Q6H PRN HOLLI Lion      hydrOXYzine HCL  25 mg Oral Q6H PRN Max 4/day Ion C Holter, DO      ibuprofen  600 mg Oral Q8H PRN HOLLI Lion      influenza vaccine  0.5 mL Intramuscular Once Bora Rosario MD      loratadine  10 mg Oral Daily Brina Guillen MD      magnesium hydroxide  30 mL Oral Once Jourdan Dickens, DO      melatonin  3 mg Oral HS PRN Bora Rosario MD      melatonin  9 mg Oral HS Bora Rosario MD      methocarbamol  500 mg Oral Q6H PRN HOLLI Lion      multivitamin-minerals  1 tablet Oral Daily HOLLI Monge      nicotine polacrilex  2 mg Oral Q4H PRN Bora Rosario MD      OLANZapine  5 mg Oral Q4H PRN Max 3/day Ion C Holter, DO      Or    OLANZapine  2.5 mg Intramuscular Q4H PRN Max 3/day Ion C Holter, DO      OLANZapine  5 mg Oral Q3H PRN Max 3/day Ion C Holter, DO      Or    OLANZapine  5 mg Intramuscular Q3H PRN Max 3/day Ion FISCHER  Holter, DO      OLANZapine  2.5 mg Oral Q4H PRN Max 6/day Jordan C Holter, DO      ondansetron  4 mg Oral Q6H PRN HOLLI Lion      pantoprazole  20 mg Oral QAM Ion Dupontter, DO      polyethylene glycol  17 g Oral Daily PRN HOLLI Ghotra      polyethylene glycol  17 g Oral Daily Jourdan Dickens, DO      propranolol  10 mg Oral Q12H KAYLIE HOLLI Lion      senna-docusate sodium  1 tablet Oral Daily PRN Jordan C Holter, DO      senna-docusate sodium  2 tablet Oral HS Jourdan Dickens, DO      traZODone  50 mg Oral HS PRN HOLLI Lion      white petrolatum-mineral oil   Topical TID PRN HOLLI Lion         Counseling / Coordination of Care: Total floor / unit time spent today 15 minutes. Greater than 50% of total time was spent with the patient and / or family counseling and / or somewhat receptive to supportive listening and teaching positive coping skills to deal with symptom mangement.     Patient's Rights, confidentiality and exceptions to confidentiality, use of automated medical record, Behavioral Health Services staff access to medical record, and consent to treatment reviewed.    This note has been dictated and hence there may be problems with punctuation, spelling and formatting and if anyone has any concerns please address them to Dr. Mora.  This note is not shared with patient due to potential for making patient's condition worse by knowing the content of the note.    RODRIGO Flores

## 2024-12-10 NOTE — ASSESSMENT & PLAN NOTE
"  Reviewed on 12/10/2024.    Patient reports feelings of depression and anxiety, racing thoughts, hopelessness, and low energy. He states his self-esteem and motivation is \"medium\" today. He states he experiences mood swings \"sometimes but rarely\", and denies any the past few days. He states he slept \"okay\" last night and is still tired this morning. He states his mood is \"depressed\". He rates his depression as 6/10 this morning. He states he is depressed and anxious due to the voices in his head constantly telling him they are going to kill him and his family and that he is going to hell. He states his anxiety is 3/10 this morning which is slightly improved. He states the voices are random people, some friends voices he recognizes, and never his own voice. He states he has been hearing the voices constantly for the past 2-3 years. He states the voices have not changed or said anything new recently. He states working out using the punching bag, listening to music and talking with peers helps him cope with the voices. He states he used the punching bag yesterday which helped and plans to use it again today. He denies any visual hallucinations. He reports persecutory delusions regarding the threatening voices he is hearing but states he feels safe here. When asked if he believes his family is safe he states \"I hope so\", and states that talking to them daily on the phone reassures him. He denies grandiose, somatic or bizarre delusions. He reports passive death wishes \"sometimes\" but denies having them currently or this morning. He denies thoughts to harm self or others, SI, or HI.        Continue Clozapine 225 mg daily at bedtime for psychosis -- to consider further careful titration for Sx's depending on response  Continue to obtain CBC with diff, CRP, and CK (weekly on Tues), and a Clozapine level and BNP (every 2 weeks on Tuesdays). Today 12/10/24- WBC 8.28, Hgb 12.6, platelets 234, ANC 4.38.    Continue Abilify 30 " mg once daily for psychosis  Continue Escitalopram 20 mg once daily for depression and anxiety  Continue Senna-Docusate sodium one 50 mg tablet daily before bed for constipation  - Decreased from two tablets daily on 11/3 due to reported loose stools  Continue Propanolol 10 mg twice daily for anxiety   Continue Amlodipine 5 mg PO daily     Pt remains on dual antipsychotic Tx due to failure on monotherapy      Dispo as per primary team: ACT team referral was made  No associated orders from this encounter found during lookback period of 72 hours.

## 2024-12-10 NOTE — ASSESSMENT & PLAN NOTE
Reviewed 12/10/24  Follows with medical  Continue famotidine 20 mg tablet BID per medical team   Continue glycopyrrolate 1 mg tablet BID AM and afternoon per medical team  Continue glycopyrrolate 2 mg tablet before bed per medical team  Continue pantoprazole EC 20 mg tablet every morning per medical team  No associated orders from this encounter found during lookback period of 72 hours.

## 2024-12-10 NOTE — PLAN OF CARE
Problem: Alteration in Thoughts and Perception  Goal: Treatment Goal: Gain control of psychotic behaviors/thinking, reduce/eliminate presenting symptoms and demonstrate improved reality functioning upon discharge  Outcome: Progressing  Goal: Refrain from acting on delusional thinking/internal stimuli  Description: Interventions:  - Monitor patient closely, per order   - Utilize least restrictive measures   - Set reasonable limits, give positive feedback for acceptable   - Administer medications as ordered and monitor of potential side effects  Outcome: Progressing  Goal: Agree to be compliant with medication regime, as prescribed and report medication side effects  Description: Interventions:  - Offer appropriate PRN medication and supervise ingestion; conduct AIMS, as needed   Outcome: Progressing  Goal: Attend and participate in unit activities, including therapeutic, recreational, and educational groups  Description: Interventions:  -Encourage Visitation and family involvement in care  Outcome: Progressing  Goal: Recognize dysfunctional thoughts, communicate reality-based thoughts at the time of discharge  Description: Interventions:  - Provide medication and psycho-education to assist patient in compliance and developing insight into his/her illness   Outcome: Progressing  Goal: Complete daily ADLs, including personal hygiene independently, as able  Description: Interventions:  - Observe, teach, and assist patient with ADLS  - Monitor and promote a balance of rest/activity, with adequate nutrition and elimination   Outcome: Progressing     Problem: Ineffective Coping  Goal: Participates in unit activities  Description: Interventions:  - Provide therapeutic environment   - Provide required programming   - Redirect inappropriate behaviors   Outcome: Progressing  Goal: Patient/Family participate in treatment and DC plans  Description: Interventions:  - Provide therapeutic environment  Outcome: Progressing      Problem: Depression  Goal: Treatment Goal: Demonstrate behavioral control of depressive symptoms, verbalize feelings of improved mood/affect, and adopt new coping skills prior to discharge  Outcome: Progressing  Goal: Refrain from harming self  Description: Interventions:  - Monitor patient closely, per order   - Supervise medication ingestion, monitor effects and side effects   Outcome: Progressing  Goal: Refrain from isolation  Description: Interventions:  - Develop a trusting relationship   - Encourage socialization   Outcome: Progressing  Goal: Refrain from self-neglect  Outcome: Progressing     Problem: Anxiety  Goal: Anxiety is at manageable level  Description: Interventions:  - Assess and monitor patient's anxiety level.   - Monitor for signs and symptoms (heart palpitations, chest pain, shortness of breath, headaches, nausea, feeling jumpy, restlessness, irritable, apprehensive).   - Collaborate with interdisciplinary team and initiate plan and interventions as ordered.  - [place] patient to unit/surroundings  - Explain treatment plan  - Encourage participation in care  - Encourage verbalization of concerns/fears  - Identify coping mechanisms  - Assist in developing anxiety-reducing skills  - Administer/offer alternative therapies  - Limit or eliminate stimulants  Outcome: Progressing     Problem: Alteration in Orientation  Goal: Treatment Goal: Demonstrate a reduction of confusion and improved orientation to person, place, time and/or situation upon discharge, according to optimum baseline/ability  Outcome: Progressing  Goal: Interact with staff daily  Description: Interventions:  - Assess and re-assess patient's level of orientation  - Engage patient in 1 on 1 interactions, daily, for a minimum of 15 minutes   - Establish rapport/trust with patient   Outcome: Progressing  Goal: Allow medical examinations, as recommended  Description: Interventions:  - Provide physical/neurological exams and/or referrals,  per provider   Outcome: Progressing  Goal: Complete daily ADLs, including personal hygiene independently, as able  Description: Interventions:  - Observe, teach, and assist patient with ADLS  Outcome: Progressing     Problem: DISCHARGE PLANNING - CARE MANAGEMENT  Goal: Discharge to post-acute care or home with appropriate resources  Description: INTERVENTIONS:  - Conduct assessment to determine patient/family and health care team treatment goals, and need for post-acute services based on payer coverage, community resources, and patient preferences, and barriers to discharge  - Address psychosocial, clinical, and financial barriers to discharge as identified in assessment in conjunction with the patient/family and health care team  - Arrange appropriate level of post-acute services according to patient's   needs and preference and payer coverage in collaboration with the physician and health care team  - Communicate with and update the patient/family, physician, and health care team regarding progress on the discharge plan  - Arrange appropriate transportation to post-acute venues  Outcome: Progressing

## 2024-12-10 NOTE — NURSING NOTE
"Pt is calm, cooperative and visible on milieu. Pt reports \"on and off\" AH and depression; denies SI/HI/VH and anxiety. Pt interacts appropriately with peers and reports sleeping well. Pt is able to make needs known and is medication compliant. Q 15 min checks maintained.   "

## 2024-12-10 NOTE — NURSING NOTE
Alberto is visible in unit, social with select peers. Compliant with meds and meals. Given Nicorette gum at 1709.  Presently denies SI/HI.  No behavior issues.

## 2024-12-10 NOTE — ASSESSMENT & PLAN NOTE
Reviewed 12/10/24  Ongoing evaluation by medical team  No associated orders from this encounter found during lookback period of 72 hours.

## 2024-12-10 NOTE — ASSESSMENT & PLAN NOTE
Reviewed 12/10/24  Follows with medical team  Continue senna-docusate sodium dose per medical team.   No associated orders from this encounter found during lookback period of 72 hours.

## 2024-12-10 NOTE — ASSESSMENT & PLAN NOTE
Reviewed 12/10/24  Continue to encourage cessation upon discharge  No associated orders from this encounter found during lookback period of 72 hours.   No

## 2024-12-10 NOTE — PROGRESS NOTES
12/10/24 0819   Team Meeting   Meeting Type Daily Rounds   Team Members Present   Team Members Present Physician;Nurse;;Other (Discipline and Name)   Physician Team Member Abraham,   Nursing Team Member Mohsen   Social Work Team Member Jose J ORTEGA   Other (Discipline and Name) Kathleen Ulrich Pharm D   Patient/Family Present   Patient Present No   Patient's Family Present No     Groups Participation  7/9.   He's appropriate and engaged in his treatment. Continues to report AH and mild depression. He interacts with his peers and is appropriate. Waiting for [facility] to be discharged home.

## 2024-12-10 NOTE — NURSING NOTE
Received pt in bed at change of shift with eyes closed; chest movement noted.  Continues the same thus this far as per q 15 min room checks.   Will continue to monitor behavior, sleeping pattern and any medical issues that may arise.    0600:  Sleeping 7+ hrs thus this far

## 2024-12-10 NOTE — ASSESSMENT & PLAN NOTE
Reviewed 12/10/24  Follows with medical team  No associated orders from this encounter found during lookback period of 72 hours.

## 2024-12-11 PROCEDURE — 99232 SBSQ HOSP IP/OBS MODERATE 35: CPT | Performed by: PSYCHIATRY & NEUROLOGY

## 2024-12-11 RX ADMIN — DOCUSATE SODIUM 100 MG: 100 CAPSULE, LIQUID FILLED ORAL at 08:44

## 2024-12-11 RX ADMIN — DOCUSATE SODIUM 100 MG: 100 CAPSULE, LIQUID FILLED ORAL at 17:08

## 2024-12-11 RX ADMIN — CLOZAPINE 250 MG: 25 TABLET ORAL at 21:25

## 2024-12-11 RX ADMIN — ARIPIPRAZOLE 30 MG: 15 TABLET ORAL at 08:44

## 2024-12-11 RX ADMIN — GLYCOPYRROLATE 1 MG: 1 TABLET ORAL at 13:18

## 2024-12-11 RX ADMIN — ESCITALOPRAM OXALATE 20 MG: 10 TABLET, FILM COATED ORAL at 08:42

## 2024-12-11 RX ADMIN — NICOTINE POLACRILEX 2 MG: 2 GUM, CHEWING ORAL at 21:25

## 2024-12-11 RX ADMIN — LORATADINE 10 MG: 10 TABLET ORAL at 08:42

## 2024-12-11 RX ADMIN — MULTIPLE VITAMINS W/ MINERALS TAB 1 TABLET: TAB ORAL at 08:43

## 2024-12-11 RX ADMIN — GLYCOPYRROLATE 2 MG: 1 TABLET ORAL at 21:25

## 2024-12-11 RX ADMIN — AMLODIPINE BESYLATE 5 MG: 5 TABLET ORAL at 08:43

## 2024-12-11 RX ADMIN — ATROPINE SULFATE 1 DROP: 10 SOLUTION/ DROPS OPHTHALMIC at 21:25

## 2024-12-11 RX ADMIN — PROPRANOLOL HYDROCHLORIDE 10 MG: 10 TABLET ORAL at 08:43

## 2024-12-11 RX ADMIN — PROPRANOLOL HYDROCHLORIDE 10 MG: 10 TABLET ORAL at 21:25

## 2024-12-11 RX ADMIN — NICOTINE POLACRILEX 2 MG: 2 GUM, CHEWING ORAL at 17:08

## 2024-12-11 RX ADMIN — NICOTINE POLACRILEX 2 MG: 2 GUM, CHEWING ORAL at 13:19

## 2024-12-11 RX ADMIN — GLYCOPYRROLATE 1 MG: 1 TABLET ORAL at 08:42

## 2024-12-11 RX ADMIN — PANTOPRAZOLE SODIUM 20 MG: 20 TABLET, DELAYED RELEASE ORAL at 08:43

## 2024-12-11 RX ADMIN — MELATONIN TAB 3 MG 9 MG: 3 TAB at 21:25

## 2024-12-11 RX ADMIN — SENNOSIDES AND DOCUSATE SODIUM 2 TABLET: 50; 8.6 TABLET ORAL at 21:25

## 2024-12-11 RX ADMIN — NICOTINE POLACRILEX 2 MG: 2 GUM, CHEWING ORAL at 08:44

## 2024-12-11 RX ADMIN — HYDROCHLOROTHIAZIDE 12.5 MG: 12.5 TABLET ORAL at 08:43

## 2024-12-11 NOTE — ASSESSMENT & PLAN NOTE
Reviewed 12/11/24  Follows with medical team  Continue senna-docusate sodium dose per medical team.   No associated orders from this encounter found during lookback period of 72 hours.

## 2024-12-11 NOTE — PLAN OF CARE
Problem: Ineffective Coping  Goal: Identifies ineffective coping skills  Outcome: Progressing  Goal: Identifies healthy coping skills  Outcome: Progressing  Goal: Demonstrates healthy coping skills  Outcome: Progressing  Goal: Participates in unit activities  Description: Interventions:  - Provide therapeutic environment   - Provide required programming   - Redirect inappropriate behaviors   Outcome: Progressing   He continues to make progress towards the goals.

## 2024-12-11 NOTE — ASSESSMENT & PLAN NOTE
Reviewed 12/11/24  Follows with medical team  No associated orders from this encounter found during lookback period of 72 hours.

## 2024-12-11 NOTE — PROGRESS NOTES
12/11/24 0809   Team Meeting   Meeting Type Daily Rounds   Team Members Present   Team Members Present Physician;Nurse;;Other (Discipline and Name)   Physician Team Member Thomas, Holter   Nursing Team Member Claudia   Social Work Team Member Jose J ORTEGA   Other (Discipline and Name) Wang [facility]   Patient/Family Present   Patient Present No   Patient's Family Present No     Groups Participation  8/9.   Patient's compliant with medications. He's engaged in his treatment. He socializes with his peers.

## 2024-12-11 NOTE — SOCIAL WORK
This writer mailed completed DL Renewal and money order to [employer].  Per [facility] and [facility] patient is still on their wait list for [facility] services and they will not expect to have an opening until January/ February.

## 2024-12-11 NOTE — NURSING NOTE
"Pt is visible on the unit and social with select peers. Consumed 100% of dinner. Took medications without incidence. Pt is pleasant and cooperative. Attended 9/10 groups. Reports \"the same\" AH, depression, and anxiety. Denies SI/HI/VH. He utilized the exercise room as a coping skill. No behavioral issues. Pt offers no complaints.   "

## 2024-12-11 NOTE — ASSESSMENT & PLAN NOTE
Reviewed 12/11/24  Follows with medical  Continue famotidine 20 mg tablet BID per medical team   Continue glycopyrrolate 1 mg tablet BID AM and afternoon per medical team  Continue glycopyrrolate 2 mg tablet before bed per medical team  Continue pantoprazole EC 20 mg tablet every morning per medical team  No associated orders from this encounter found during lookback period of 72 hours.

## 2024-12-11 NOTE — PLAN OF CARE
Problem: Alteration in Thoughts and Perception  Goal: Treatment Goal: Gain control of psychotic behaviors/thinking, reduce/eliminate presenting symptoms and demonstrate improved reality functioning upon discharge  Outcome: Progressing  Goal: Refrain from acting on delusional thinking/internal stimuli  Description: Interventions:  - Monitor patient closely, per order   - Utilize least restrictive measures   - Set reasonable limits, give positive feedback for acceptable   - Administer medications as ordered and monitor of potential side effects  Outcome: Progressing  Goal: Agree to be compliant with medication regime, as prescribed and report medication side effects  Description: Interventions:  - Offer appropriate PRN medication and supervise ingestion; conduct AIMS, as needed   Outcome: Progressing  Goal: Attend and participate in unit activities, including therapeutic, recreational, and educational groups  Description: Interventions:  -Encourage Visitation and family involvement in care  Outcome: Progressing  Goal: Complete daily ADLs, including personal hygiene independently, as able  Description: Interventions:  - Observe, teach, and assist patient with ADLS  - Monitor and promote a balance of rest/activity, with adequate nutrition and elimination   Outcome: Progressing     Problem: Ineffective Coping  Goal: Identifies healthy coping skills  Outcome: Progressing  Goal: Demonstrates healthy coping skills  Outcome: Progressing  Goal: Participates in unit activities  Description: Interventions:  - Provide therapeutic environment   - Provide required programming   - Redirect inappropriate behaviors   Outcome: Progressing     Problem: Depression  Goal: Refrain from harming self  Description: Interventions:  - Monitor patient closely, per order   - Supervise medication ingestion, monitor effects and side effects   Outcome: Progressing  Goal: Refrain from isolation  Description: Interventions:  - Develop a trusting  relationship   - Encourage socialization   Outcome: Progressing  Goal: Refrain from self-neglect  Outcome: Progressing     Problem: Anxiety  Goal: Anxiety is at manageable level  Description: Interventions:  - Assess and monitor patient's anxiety level.   - Monitor for signs and symptoms (heart palpitations, chest pain, shortness of breath, headaches, nausea, feeling jumpy, restlessness, irritable, apprehensive).   - Collaborate with interdisciplinary team and initiate plan and interventions as ordered.  - [place] patient to unit/surroundings  - Explain treatment plan  - Encourage participation in care  - Encourage verbalization of concerns/fears  - Identify coping mechanisms  - Assist in developing anxiety-reducing skills  - Administer/offer alternative therapies  - Limit or eliminate stimulants  Outcome: Progressing

## 2024-12-11 NOTE — ASSESSMENT & PLAN NOTE
Reviewed 12/11/24  Continue to encourage cessation upon discharge  No associated orders from this encounter found during lookback period of 72 hours.

## 2024-12-11 NOTE — PROGRESS NOTES
"Progress Note - Behavioral Health   Name: Alberto Berumen 29 y.o. male I MRN: [id number]  Unit/Bed#: [facility] 112-02 I Date of Admission: 3/29/2024   Date of Service: 12/11/2024 I Hospital Day: 257     Assessment & Plan  Schizoaffective disorder, bipolar type (HCC)    Reviewed on 12/11/2024.    Patient reports feelings of depression and anxiety, racing thoughts, hopelessness, low energy, and low self-esteem. He states his motivation is \"a little low\" today. He states he experiences mood swings \"sometimes but rarely\", and denies any the past few days. He states he slept \"well\" last night. He states his mood is \"stable\". He rates his depression as 5/10 this morning. He states he is depressed and anxious due to the voices in his head constantly telling him they are going to kill him and his family and that he is going to hell. He states his anxiety is 4/10 this morning. He states the voices are random people, some friends voices he recognizes, and never his own voice. He states he has been hearing the voices constantly for the past 2-3 years. He states the voices have not changed or said anything new recently. He states working out using the punching bag, listening to music and talking with peers helps him cope with the voices. He states he did not use the punching bag yesterday because he was tired but he plans to use it again today. He denies any visual hallucinations. He reports persecutory delusions regarding the threatening voices he is hearing but states he feels safe here. When asked if he believes his family is safe he states \"I hope so\", and states that talking to them daily on the phone reassures him. He denies grandiose, somatic or bizarre delusions. He reports passive death wishes \"sometimes\" but denies having them currently or this morning. He denies thoughts to harm self or others, SI, or HI. Clozapine increased from 225 mg to 250 mg daily at bedtime for psychosis starting today (12/11/14).        Starting " today (12/11/24)- Increase Clozapine 225 mg to 250 mg daily at bedtime for psychosis -- to consider further careful titration for Sx's depending on response  Continue to obtain CBC with diff, CRP, and CK (weekly on Tues), and a Clozapine level and BNP (every 2 weeks on Tuesdays). 12/10/24- WBC 8.28, Hgb 12.6, platelets 234, ANC 4.38.    Continue Abilify 30 mg once daily for psychosis  Continue Escitalopram 20 mg once daily for depression and anxiety  Continue Senna-Docusate sodium one 50 mg tablet daily before bed for constipation  - Decreased from two tablets daily on 11/3 due to reported loose stools  Continue Propanolol 10 mg twice daily for anxiety   Continue Amlodipine 5 mg PO daily     Pt remains on dual antipsychotic Tx due to failure on monotherapy      Dispo as per primary team: ACT team referral was made  No associated orders from this encounter found during lookback period of 72 hours.    Chronic idiopathic constipation  Reviewed 12/11/24  Follows with medical team  Continue senna-docusate sodium dose per medical team.   No associated orders from this encounter found during lookback period of 72 hours.  GERD (gastroesophageal reflux disease)  Reviewed 12/11/24  Follows with medical  Continue famotidine 20 mg tablet BID per medical team   Continue glycopyrrolate 1 mg tablet BID AM and afternoon per medical team  Continue glycopyrrolate 2 mg tablet before bed per medical team  Continue pantoprazole EC 20 mg tablet every morning per medical team  No associated orders from this encounter found during lookback period of 72 hours.  Medical clearance for psychiatric admission  Reviewed 12/11/24  Ongoing evaluation by medical team  No associated orders from this encounter found during lookback period of 72 hours.    Tobacco abuse  Reviewed 12/11/24  Continue to encourage cessation upon discharge  No associated orders from this encounter found during lookback period of 72 hours.  Elevated hemoglobin A1c  Reviewed  12/11/24  Follows with medical team  No associated orders from this encounter found during lookback period of 72 hours.  Class 2 obesity in adult  Reviewed 12/11/24  Follows with medical team  No associated orders from this encounter found during lookback period of 72 hours.    Primary hypertension  Reviewed 12/11/24  Follows with medical team  No associated orders from this encounter found during lookback period of 72 hours.    Psychiatry Progress Note [facility]    Progress towards goals: Progressing    Review of systems: Unremarkable    Psychiatric Diagnosis: Schizoaffective disorder, bipolar type    Assessment  Overall Status:  Status quo- continues to experience anxiety and depression regarding threatening voices he hears but is trying to find positive ways to cope with them  Certification Statement: The patient will continue to require additional inpatient hospital stay due to schizoaffective disorder and related symptoms       Medications: as above  Side effects from treatment: Denied  Medication changes: as above  Medication education: Risks side effects benefits and precautions of medications discussed with patient and they did verbalize an understanding about risks for metabolic syndrome from being on neuroleptics and tardive dyskinesia etc.  All medications reviewed and I recommend that they be continued for symptom management  Understanding of medications: Risks side effects benefits and precautions of medications discussed with patient and they did verbalize an understanding about risks for metabolic syndrome from being on neuroleptics and tardive dyskinesia etc., and patient appeared to have understanding.  Justification for dual anti-psychotics: Due to failure on monotherapy    Non-pharmacological treatments  Continue with individual, group, milieu and occupational therapy using recovery principles and psycho-education about accepting illness and the need for  "treatment.  Behavioral health checks every 7 minutes    Safety  Safety and communication plan established to target dynamic risk factors discussed above.    Discharge Plan   To live with aunt once [facility] approves funding for ACT team- ACT team most likely not available until January or February    Interval Progress: Per treatment team, patient is compliant with meals and medications. He is visible, cooperative, and social on unit. He did not require any PRN psychotropic medications yesterday or this morning. Team states he has been reading the bible with a peer on unit. He attended 8/9 groups yesterday.    Today, patient evaluated by this writer and Dr. Rosario in [facility]. He states his mood is \"stable\". He states still feels anxiety and depression regarding the threatening voices he is hearing telling him they are going to kill him and his family and that he is going to hell. He states he has been hearing these voices constantly for the past 2-3 years and is trying to find positive ways to cope with them. He states working out using the punching bag, listening to music, and talking with peers helps him cope. He rates his depression as 5/10 this morning and his anxiety as 4/10 this morning. He denies any visual hallucinations. He reports persecutory delusions regarding the voices he hears but states he feels safe here on the unit and hopes his family is safe. He states talking to his family on the phone helps reassure him that they are safe. He reports racing thoughts, feelings of hopelessness, low energy, and low self-esteem. He states his motivation today is \"a little low\". He states he has mood swings \"rarely\" and has not had one the past few days. He reports passive death wishes \"sometimes\" but denies any currently or this morning. He denies any thoughts of harm to self or others, SI or HI. He states he slept \"well\" last night. He states he has a good appetite and is eating well but that he is trying to eat " "healthier and work out more to lose weight. He denies any medication side effects.    Acceptance by patient: Accepting of medications and treatments  Hopefulness in recovery: Hopeful to live with aunt upon discharge  Involved in reintegration process: Patient communication with aunt and sister on phone, had in- person visit with sister and niece on unit this past Saturday.  Trusting in relationship with psychiatrist: Present  Sleep: Good  Appetite: Good  Compliance with Medications: Compliant  Group attendance: 8/9 groups yesterday  Significant events: None in the past 24 hours    Mental Status Exam  Appearance: age appropriate, casually dressed, dressed appropriately, adequate grooming  Behavior: cooperative, calm, good  eye contact  Speech: normal rate, normal volume, normal pitch, not pressured  Mood: \"stable\"  Affect: constricted, mood-congruent  Thought Process: organized, logical, coherent, goal directed, linear  Thought Content: Mild paranoia regarding the threatening voices he hears  Perceptual Disturbances: Auditory hallucinations of voices telling him they are going to kill him and his family. Denies any visual hallucinations when asked. Does not appear to be responding to internal stimuli  Sensorium: alert and oriented to person, place, time and situation  Cognition: recent and remote memory grossly intact  Consciousness: alert and awake  Attention: attention span and concentration are age appropriate  Intellect: appears to be of average intelligence  Insight: fair  Judgement: fair  Motor Activity: no abnormal movements     Vitals  Temp:  [96.7 °F (35.9 °C)-97.7 °F (36.5 °C)] 96.7 °F (35.9 °C)  HR:  [71-96] 84  Resp:  [16-18] 18  BP: ()/(58-90) 135/66  SpO2:  [99 %] 99 %  No intake or output data in the 24 hours ending 12/11/24 1009    Lab Results: All Labs For Current Hospital Admission Reviewed  Results from last 7 days   Lab Units 12/10/24  0914   WBC Thousand/uL 8.28   RBC Million/uL 5.20 "   HEMOGLOBIN g/dL 12.6   HEMATOCRIT % 40.6   MCV fL 78*   PLATELETS Thousands/uL 234   TOTAL NEUT ABS Thousands/µL 4.38       Current Facility-Administered Medications   Medication Dose Route Frequency Provider Last Rate    acetaminophen  650 mg Oral Q4H PRN Jordan C Holter, DO      acetaminophen  650 mg Oral Q6H PRN HOLLI Lion      aluminum-magnesium hydroxide-simethicone  30 mL Oral Q4H PRN Jordan C Holter, DO      amLODIPine  5 mg Oral Daily HOLLI Lion      ARIPiprazole  30 mg Oral Daily Bora Rosario MD      Artificial Tears  1 drop Both Eyes Q3H PRN Jordan C Holter, DO      atropine  1 drop Sublingual HS Harjeet TorresDO      haloperidol lactate  2.5 mg Intramuscular Q4H PRN Max 4/day HOLLI Lion      And    LORazepam  1 mg Intramuscular Q4H PRN Max 4/day HOLLI Lion      And    benztropine  0.5 mg Intramuscular Q4H PRN Max 4/day HOLLI Lion      benztropine  1 mg Intramuscular Q4H PRN Max 6/day Jordan C Holter, DO      haloperidol lactate  5 mg Intramuscular Q4H PRN Max 4/day HOLLI Lion      And    LORazepam  2 mg Intramuscular Q4H PRN Max 4/day HOLLI Lion      And    benztropine  1 mg Intramuscular Q4H PRN Max 4/day HOLLI Lion      benztropine  1 mg Oral Q4H PRN Max 6/day HOLLI Lion      benztropine  1 mg Oral Q4H PRN Max 6/day Jordan C Holter, DO      bisacodyl  10 mg Rectal Daily PRN HOLLI Lion      calcium carbonate  500 mg Oral BID PRN HOLLI Monge      cloZAPine  250 mg Oral HS Bora Rosario MD      hydrOXYzine HCL  50 mg Oral Q6H PRN Max 4/day Jordan C Holter, DO      Or    diphenhydrAMINE  50 mg Intramuscular Q6H PRN Jordan C Holter, DO      hydrOXYzine HCL  50 mg Oral Q6H PRN Max 4/day HOLLI Lion      Or    diphenhydrAMINE  50 mg Intramuscular Q6H PRN HOLLI Lion      diphenhydrAMINE-zinc acetate   Topical BID PRN HOLLI Lion      docusate sodium  100 mg Oral BID Jourdan Dickens DO       escitalopram  20 mg Oral Daily HOLLI Lion      famotidine  20 mg Oral BID PRN HOLLI Monge      fluticasone  1 spray Each Nare Daily PRN HOLLI Monge      glycopyrrolate  1 mg Oral BID (AM & Afternoon) Bora Rosario MD      glycopyrrolate  2 mg Oral HS Bora Rosario MD      haloperidol  1 mg Oral Q6H PRN HOLLI Lion      haloperidol  2.5 mg Oral Q4H PRN Max 4/day HOLLI Lion      haloperidol  5 mg Oral Q4H PRN Max 4/day HOLLI Lino      hydroCHLOROthiazide  12.5 mg Oral Daily HOLLI Galvan      hydrocortisone   Topical 4x Daily PRN HOLLI Lion      hydrOXYzine HCL  100 mg Oral Q6H PRN Max 4/day Ion C Holter, DO      Or    LORazepam  2 mg Intramuscular Q6H PRN Ion C Holter, DO      hydrOXYzine HCL  100 mg Oral Q6H PRN Max 4/day HOLLI Lion      Or    LORazepam  2 mg Intramuscular Q6H PRN HOLLI Lion      hydrOXYzine HCL  25 mg Oral Q6H PRN Max 4/day Ion C Holter, DO      ibuprofen  600 mg Oral Q8H PRN HOLLI Lion      influenza vaccine  0.5 mL Intramuscular Once Bora Rosario MD      loratadine  10 mg Oral Daily Brina Guillen MD      magnesium hydroxide  30 mL Oral Once Jourdan Dickens, DO      melatonin  3 mg Oral HS PRN Bora Rosario MD      melatonin  9 mg Oral HS Bora Rosario MD      methocarbamol  500 mg Oral Q6H PRN HOLLI Lion      multivitamin-minerals  1 tablet Oral Daily HOLLI Monge      nicotine polacrilex  2 mg Oral Q4H PRN Bora Rosario MD      OLANZapine  5 mg Oral Q4H PRN Max 3/day Ion C Holter, DO      Or    OLANZapine  2.5 mg Intramuscular Q4H PRN Max 3/day Ion C Holter, DO      OLANZapine  5 mg Oral Q3H PRN Max 3/day Ion C Holter, DO      Or    OLANZapine  5 mg Intramuscular Q3H PRN Max 3/day Ion C Holter, DO      OLANZapine  2.5 mg Oral Q4H PRN Max 6/day Ion C Holter, DO      ondansetron  4 mg Oral Q6H PRN HOLLI Lion      pantoprazole   20 mg Oral QAM Jordan C Holter, DO      polyethylene glycol  17 g Oral Daily PRN HOLLI Ghotra      polyethylene glycol  17 g Oral Daily Jourdan Dickens, DO      propranolol  10 mg Oral Q12H [facility] HOLLI Lion      senna-docusate sodium  1 tablet Oral Daily PRN Jordan C Holter, DO      senna-docusate sodium  2 tablet Oral HS Jourdan Dickens, DO      traZODone  50 mg Oral HS PRN HOLLI Lion      white petrolatum-mineral oil   Topical TID PRN HOLLI Lion         Counseling / Coordination of Care: Total floor / unit time spent today 15 minutes. Greater than 50% of total time was spent with the patient and / or family counseling and / or somewhat receptive to supportive listening and teaching positive coping skills to deal with symptom mangement.     Patient's Rights, confidentiality and exceptions to confidentiality, use of automated medical record, Behavioral Health Services staff access to medical record, and consent to treatment reviewed.    This note has been dictated and hence there may be problems with punctuation, spelling and formatting and if anyone has any concerns please address them to Dr. Mora.  This note is not shared with patient due to potential for making patient's condition worse by knowing the content of the note.    RODRIGO Flores

## 2024-12-11 NOTE — ASSESSMENT & PLAN NOTE
"  Reviewed on 12/11/2024.    Patient reports feelings of depression and anxiety, racing thoughts, hopelessness, low energy, and low self-esteem. He states his motivation is \"a little low\" today. He states he experiences mood swings \"sometimes but rarely\", and denies any the past few days. He states he slept \"well\" last night. He states his mood is \"stable\". He rates his depression as 5/10 this morning. He states he is depressed and anxious due to the voices in his head constantly telling him they are going to kill him and his family and that he is going to hell. He states his anxiety is 4/10 this morning. He states the voices are random people, some friends voices he recognizes, and never his own voice. He states he has been hearing the voices constantly for the past 2-3 years. He states the voices have not changed or said anything new recently. He states working out using the punching bag, listening to music and talking with peers helps him cope with the voices. He states he did not use the punching bag yesterday because he was tired but he plans to use it again today. He denies any visual hallucinations. He reports persecutory delusions regarding the threatening voices he is hearing but states he feels safe here. When asked if he believes his family is safe he states \"I hope so\", and states that talking to them daily on the phone reassures him. He denies grandiose, somatic or bizarre delusions. He reports passive death wishes \"sometimes\" but denies having them currently or this morning. He denies thoughts to harm self or others, SI, or HI. Clozapine increased from 225 mg to 250 mg daily at bedtime for psychosis starting today (12/11/14).        Starting today (12/11/24)- Increase Clozapine 225 mg to 250 mg daily at bedtime for psychosis -- to consider further careful titration for Sx's depending on response  Continue to obtain CBC with diff, CRP, and CK (weekly on Tues), and a Clozapine level and BNP (every 2 " weeks on Tuesdays). 12/10/24- WBC 8.28, Hgb 12.6, platelets 234, ANC 4.38.    Continue Abilify 30 mg once daily for psychosis  Continue Escitalopram 20 mg once daily for depression and anxiety  Continue Senna-Docusate sodium one 50 mg tablet daily before bed for constipation  - Decreased from two tablets daily on 11/3 due to reported loose stools  Continue Propanolol 10 mg twice daily for anxiety   Continue Amlodipine 5 mg PO daily     Pt remains on dual antipsychotic Tx due to failure on monotherapy      Dispo as per primary team: ACT team referral was made  No associated orders from this encounter found during lookback period of 72 hours.

## 2024-12-11 NOTE — ASSESSMENT & PLAN NOTE
Reviewed 12/11/24  Ongoing evaluation by medical team  No associated orders from this encounter found during lookback period of 72 hours.

## 2024-12-12 LAB — CK SERPL-CCNC: 960 U/L (ref 39–308)

## 2024-12-12 PROCEDURE — 82550 ASSAY OF CK (CPK): CPT | Performed by: PSYCHIATRY & NEUROLOGY

## 2024-12-12 PROCEDURE — 99232 SBSQ HOSP IP/OBS MODERATE 35: CPT | Performed by: PSYCHIATRY & NEUROLOGY

## 2024-12-12 RX ADMIN — CLOZAPINE 250 MG: 25 TABLET ORAL at 21:26

## 2024-12-12 RX ADMIN — LORATADINE 10 MG: 10 TABLET ORAL at 08:52

## 2024-12-12 RX ADMIN — NICOTINE POLACRILEX 2 MG: 2 GUM, CHEWING ORAL at 13:05

## 2024-12-12 RX ADMIN — AMLODIPINE BESYLATE 5 MG: 5 TABLET ORAL at 08:53

## 2024-12-12 RX ADMIN — PROPRANOLOL HYDROCHLORIDE 10 MG: 10 TABLET ORAL at 21:27

## 2024-12-12 RX ADMIN — DOCUSATE SODIUM 100 MG: 100 CAPSULE, LIQUID FILLED ORAL at 08:52

## 2024-12-12 RX ADMIN — SENNOSIDES AND DOCUSATE SODIUM 2 TABLET: 50; 8.6 TABLET ORAL at 21:27

## 2024-12-12 RX ADMIN — GLYCOPYRROLATE 2 MG: 1 TABLET ORAL at 21:26

## 2024-12-12 RX ADMIN — ARIPIPRAZOLE 30 MG: 15 TABLET ORAL at 08:53

## 2024-12-12 RX ADMIN — ESCITALOPRAM OXALATE 20 MG: 10 TABLET, FILM COATED ORAL at 08:54

## 2024-12-12 RX ADMIN — NICOTINE POLACRILEX 2 MG: 2 GUM, CHEWING ORAL at 21:34

## 2024-12-12 RX ADMIN — MELATONIN TAB 3 MG 9 MG: 3 TAB at 21:26

## 2024-12-12 RX ADMIN — NICOTINE POLACRILEX 2 MG: 2 GUM, CHEWING ORAL at 17:16

## 2024-12-12 RX ADMIN — PANTOPRAZOLE SODIUM 20 MG: 20 TABLET, DELAYED RELEASE ORAL at 08:53

## 2024-12-12 RX ADMIN — NICOTINE POLACRILEX 2 MG: 2 GUM, CHEWING ORAL at 08:52

## 2024-12-12 RX ADMIN — GLYCOPYRROLATE 1 MG: 1 TABLET ORAL at 08:53

## 2024-12-12 RX ADMIN — HYDROCHLOROTHIAZIDE 12.5 MG: 12.5 TABLET ORAL at 08:52

## 2024-12-12 RX ADMIN — GLYCOPYRROLATE 1 MG: 1 TABLET ORAL at 13:05

## 2024-12-12 RX ADMIN — PROPRANOLOL HYDROCHLORIDE 10 MG: 10 TABLET ORAL at 08:53

## 2024-12-12 RX ADMIN — MULTIPLE VITAMINS W/ MINERALS TAB 1 TABLET: TAB ORAL at 08:52

## 2024-12-12 RX ADMIN — DOCUSATE SODIUM 100 MG: 100 CAPSULE, LIQUID FILLED ORAL at 17:10

## 2024-12-12 NOTE — NURSING NOTE
Received pt in bed at change of shift with eyes closed; chest movement noted.  Continues the same thus this far as per q 15 min room checks.   Will continue to monitor behavior, sleeping pattern and any medical issues that may arise.    0538:  Sleeping 7+ hrs thus this far    0605:  Lab obtained as ordered and to be delivered to [employer] lab.

## 2024-12-12 NOTE — ASSESSMENT & PLAN NOTE
Reviewed 12/12/24  Follows with medical team and given dietary counseling and need for exercise  No associated orders from this encounter found during lookback period of 72 hours.

## 2024-12-12 NOTE — PROGRESS NOTES
12/12/24 0851   Team Meeting   Meeting Type Daily Rounds   Team Members Present   Team Members Present Physician;Nurse;;Other (Discipline and Name)   Physician Team Member Abraham   Nursing Team Member Claudia   Social Work Team Member Jose J ORTEGA   Other (Discipline and Name) Jeremias Grace MS   Patient/Family Present   Patient Present No   Patient's Family Present No     Groups Participation  9/10.   Patient's compliant with medications. He's engaged in his treatment. He had an elevated CK level.

## 2024-12-12 NOTE — PROGRESS NOTES
Progress Note - Behavioral Health   Name: Alberto Berumen 29 y.o. male I MRN: [id number]  Unit/Bed#: [facility] 112-02 I Date of Admission: 3/29/2024   Date of Service: 12/12/2024 I Hospital Day: 258     Assessment & Plan  Schizoaffective disorder, bipolar type (HCC)    Reviewed on 12/12/2024.  Continues to hear the same threatening voices that tell him that they are going to kill him and his family and that he is going to go to hell but he has been hearing them for more than 3 years and they are never commanding.  This makes him feel somewhat depressed and now he is beginning to realize that he needs to learn coping skills to better learn to live with all.  Continues to feel safe and not acting up and he is engaging in walks around the unit and interacts with select peers and tolerating the recent increase in clozapine but has not responded to higher doses of clozapine even in the past      Clozapine 225 mg to 250 mg daily at bedtime for psychosis as of 12/11/2024-- to consider further careful titration for Sx's depending on response  Continue CBC with differential every 2 weeks with CK and another 2 weeks to check for any further increase in CK due to prior history of increase in CK from clozapine and CK was today 960 and medical encouraged him to drink fluids and he has agreed to force fluids    Continue Abilify 30 mg once daily for psychosis as he  is resistant to single antipsychotic  Continue Escitalopram 20 mg once daily for depression and anxiety  Continue Senna-Docusate sodium one 50 mg tablet daily before bed for constipation  Continue Robinul for drooling from Clozapine  Continue Propanolol 10 mg twice daily for anxiety     Pt remains on dual antipsychotic Tx due to failure on monotherapy      : ACT team referral was made and he is still living back with his aunt once MA funding comes through from the [facility]  No associated orders from this encounter found during lookback period of 72  hours.    Chronic idiopathic constipation  Reviewed 12/12/24  Follows with medical team  Continue senna-docusate sodium dose per medical team.   No associated orders from this encounter found during lookback period of 72 hours.  GERD (gastroesophageal reflux disease)  Reviewed 12/12/24  Follows with medical  Continue famotidine 20 mg tablet BID per medical team   Continue glycopyrrolate 1 mg tablet BID AM and afternoon per medical team  Continue glycopyrrolate 2 mg tablet before bed per medical team  Continue pantoprazole EC 20 mg tablet every morning per medical team  No associated orders from this encounter found during lookback period of 72 hours.  Medical clearance for psychiatric admission  Reviewed 12/12/24  Ongoing evaluation by medical team  No associated orders from this encounter found during lookback period of 72 hours.    Tobacco abuse  Reviewed 12/12/24, on nicotine gum as as needed and encouraged smoking cessation  Continue to encourage cessation upon discharge  No associated orders from this encounter found during lookback period of 72 hours.  Elevated hemoglobin A1c  Reviewed 12/12/24  Follows with medical team  No associated orders from this encounter found during lookback period of 72 hours.  Class 2 obesity in adult  Reviewed 12/12/24  Follows with medical team and given dietary counseling and need for exercise  No associated orders from this encounter found during lookback period of 72 hours.    Primary hypertension  Reviewed 12/12/24  Follows with medical team on hydrochlorothiazide and Norvasc  No associated orders from this encounter found during lookback period of 72 hours.    Patient Active Problem List   Diagnosis    GERD (gastroesophageal reflux disease)    Medical clearance for psychiatric admission    Schizoaffective disorder, bipolar type (HCC)    Tobacco abuse    T wave inversion in EKG    Syringoma    Chronic idiopathic constipation    Vitamin B 12 deficiency    Vitamin D deficiency     Confluent and reticulate papillomatosis    Class 2 obesity in adult    Primary hypertension    Elevated hemoglobin A1c    Bilateral lower extremity edema     Review of systems: Unremarkable   assessment  Overall Status: Status quo with no significant changes  certification Statement: The patient will continue to require additional inpatient hospital stay due to ongoing voices that are threatening to mixing lack of response to medications and ECT until eligible for an ACT team to live with his aunt.     Medications:  propranolol 10 mg every 12 hours  for anxiety, Abilify 30 mg mg at bedtime Lexapro 20 mg once a day,  and senna once a day day for constipation clozapine 250 mg at bedtime, Robinul 1 mg twice a day and 2 mg at bedtime for drooling from Clozapine, Colace 100 mg twice a day with MiraLAX daily for constipation from Clozapine  All medications were reviewed  side effects from treatment: None reported  Medication changes   Clozapine increased to 50 mg yesterday  Medication education  Risks side effects benefits and precautions of medications discussed with patient and he did verbalize an understanding about risks for metabolic syndrome from being on neuroleptics and risk for tardive dyskinesia etc.   Understanding of medications: Has some understanding  Justification for dual anti-psychotics: due to not responding to single antipsychotics    Non-pharmacological treatments  Continue with individual, group, milieu and occupational therapy using recovery principles and psycho-education about accepting illness and the need for treatment.  Waiting for [facility] to fund an ACT team as he lost his MA benefits and to live with aunt in the [facility]  Maintenance ECTs  completed several months ago  Refuses to see a dietician and agrees to do more exercises as he is about 100 lbs overweight   Prolactin level high so Risperdal replaced with Abilify which he has tolerated well so far with prolactin level back to  normal  Counseled to take the stool softeners and laxatives regularly    Safety  Safety and communication plan established to target dynamic risk factors discussed above.    Discharge Plan back to his aunt when [facility] starts funding for ACT program as his medical assistance benefits are no longer available since he is on Medicare  Interval Progress   Note significant changes, tolerating clozapine as 250 mg at bedtime and will follow up with an increase in CK.to 960 and medical asked him to drink fluids and he has agreed to do so.  Still with same threatening voices to kill him and his family and that he is going to go to hell and he gets out but he understands he has been hearing them for over 3 years and has learned to live with them and his voices have not gone away at all even with higher doses of clozapine in the past.  He has not been aggressive or agitated or threatening or self-abusive with no need for behavioral PRNs in the last 24 hours.  Walks around the unit and socializes with certain peers and using the punching bag as a coping mechanism and was reading the Bible with a few of the other day.  Does visit with his siblings and talks to his aunt and he is hoping to move in with her again with an ACT team once ACT team is funded by [facility].  Still reports some sadness over his chronic symptoms but not suicidal    acceptance by patient: Accepting  Hopefulness in recovery: Living with his aunt  Involved in reintegration process: Talking with and meeting with his siblings from [place] and with his aunt from [facility]   trusting in relationship with psychiatrist: Trusting  Sleep: Good  Appetite: Good  Compliance with Medications: Good  Group attendance: Attending most of the groups 9/10  Significant events and progress towards goals: status quo waiting for funding from [facility]    Mental Status Exam  Appearance: age appropriate, improved grooming, looks older than stated age, overweight,  with hair in dreadlocks casually dressed with a clean shaven face, fairly groomed with good eye contact laying on bed under the covers but easily aroused with good eye contact   behavior: Friendly, cooperative and pleasant continues to report some sadness  speech: normal rate, normal volume, normal pitch  Mood: dysphoric, anxious, continues to report feeling good  ongoing presence of voices that are threatening as usual   affect: constricted, inappropriate, mood-congruent.  With no good mood reactivity   thought Process: organized, logical, coherent, goal directed, linear, decreased rate of thoughts, slowing of thoughts, negative thinking, impaired abstract reasoning, concrete  Thought Content: paranoid ideation, some paranoia, grandiose ideas, intrusive thoughts, preoccupied, chronic, continues to report paranoia about people threatening to kill him and his family because of the voices.  He believes ECT has helped so that he is not much in his head.  No other delusions elicited.  No current suicidal or homicidal thoughts and no plans verbalized but today reports having passive death wishes because of voices with no plans and able to CFS and it has not changed yet  .  No phobias obsessions compulsions or distorted body perceptions reported.   Perceptual Disturbances: Continues to report same threatening voices as before not commanding   Hx Risk Factors: chronic psychiatric problems, chronic anxiety symptoms, chronic psychotic symptoms,    Sensorium: Oriented x 3 spheres and situation  Attention and concentration span: Intact  Cognition: recent and remote memory grossly intact  Consciousness: alert and awake  Intellect: appears to be of average intelligence  Insight: intact  Judgement: intact  Motor Activity: no abnormal movements     Vitals  Temp:  [96.7 °F (35.9 °C)-97.6 °F (36.4 °C)] 97.6 °F (36.4 °C)  HR:  [] 101  Resp:  [18] 18  BP: (122-135)/(66-81) 122/72  SpO2:  [99 %] 99 %  No intake or output data in  the 24 hours ending 12/12/24 7686        Lab Results: All labs reviewed and prolactin level came down to normal on 8/1/2024, clozapine level 820 on 9/5/24  Current Facility-Administered Medications   Medication Dose Route Frequency Provider Last Rate    acetaminophen  650 mg Oral Q4H PRN Jordan C Holter, DO      acetaminophen  650 mg Oral Q6H PRN HOLLI Lion      aluminum-magnesium hydroxide-simethicone  30 mL Oral Q4H PRN Jordan C Holter, DO      amLODIPine  5 mg Oral Daily HOLLI Lion      ARIPiprazole  30 mg Oral Daily Bora Rosario MD      Artificial Tears  1 drop Both Eyes Q3H PRN Jordan C Holter,       atropine  1 drop Sublingual  Harjeet Torres,       haloperidol lactate  2.5 mg Intramuscular Q4H PRN Max 4/day HOLLI Lion      And    LORazepam  1 mg Intramuscular Q4H PRN Max 4/day HOLLI Lion      And    benztropine  0.5 mg Intramuscular Q4H PRN Max 4/day HOLLI Lion      benztropine  1 mg Intramuscular Q4H PRN Max 6/day Jordan C Holter, DO      haloperidol lactate  5 mg Intramuscular Q4H PRN Max 4/day HOLLI Lion      And    LORazepam  2 mg Intramuscular Q4H PRN Max 4/day HOLLI Lion      And    benztropine  1 mg Intramuscular Q4H PRN Max 4/day HOLLI Lion      benztropine  1 mg Oral Q4H PRN Max 6/day HOLLI Lion      benztropine  1 mg Oral Q4H PRN Max 6/day Jordan C Holter, DO      bisacodyl  10 mg Rectal Daily PRN HOLLI Lion      calcium carbonate  500 mg Oral BID PRN HOLLI Monge      cloZAPine  250 mg Oral HS Bora Rosario MD      hydrOXYzine HCL  50 mg Oral Q6H PRN Max 4/day Jordan C Holter, DO      Or    diphenhydrAMINE  50 mg Intramuscular Q6H PRN Jordan C Holter, DO      hydrOXYzine HCL  50 mg Oral Q6H PRN Max 4/day HOLLI Lion      Or    diphenhydrAMINE  50 mg Intramuscular Q6H PRN HOLLI Lion      diphenhydrAMINE-zinc acetate   Topical BID PRN HOLLI Lion      docusate sodium  100 mg  Oral BID Jourdan Dickens, DO      escitalopram  20 mg Oral Daily HOLLI Lion      famotidine  20 mg Oral BID PRN HOLLI Monge      fluticasone  1 spray Each Nare Daily PRN HOLLI Monge      glycopyrrolate  1 mg Oral BID (AM & Afternoon) Bora Rosario MD      glycopyrrolate  2 mg Oral HS Bora Rosario MD      haloperidol  1 mg Oral Q6H PRN HOLLI Lion      haloperidol  2.5 mg Oral Q4H PRN Max 4/day HOLLI Lion      haloperidol  5 mg Oral Q4H PRN Max 4/day HOLLI Lion      hydroCHLOROthiazide  12.5 mg Oral Daily HOLLI Galvan      hydrocortisone   Topical 4x Daily PRN HOLLI Lion      hydrOXYzine HCL  100 mg Oral Q6H PRN Max 4/day Ion C Holter, DO      Or    LORazepam  2 mg Intramuscular Q6H PRN [facility] Holter, DO      hydrOXYzine HCL  100 mg Oral Q6H PRN Max 4/day HOLLI Lion      Or    LORazepam  2 mg Intramuscular Q6H PRN HOLLI Lion      hydrOXYzine HCL  25 mg Oral Q6H PRN Max 4/day Ion C Holter, DO      ibuprofen  600 mg Oral Q8H PRN HOLLI Lion      influenza vaccine  0.5 mL Intramuscular Once Bora Rosario MD      loratadine  10 mg Oral Daily Brina Guillen MD      magnesium hydroxide  30 mL Oral Once Jourdan Dickens, DO      melatonin  3 mg Oral HS PRN Bora Rosario MD      melatonin  9 mg Oral HS Bora Rosario MD      methocarbamol  500 mg Oral Q6H PRN HOLLI Lion      multivitamin-minerals  1 tablet Oral Daily HOLLI Monge      nicotine polacrilex  2 mg Oral Q4H PRN Bora Rosario MD      OLANZapine  5 mg Oral Q4H PRN Max 3/day Ion C Holter, DO      Or    OLANZapine  2.5 mg Intramuscular Q4H PRN Max 3/day Ion C Holter, DO      OLANZapine  5 mg Oral Q3H PRN Max 3/day Ion C Holter, DO      Or    OLANZapine  5 mg Intramuscular Q3H PRN Max 3/day Ion C Holter, DO      OLANZapine  2.5 mg Oral Q4H PRN Max 6/day Ion C Holter,       ondansetron  4 mg Oral Q6H PRN Eveline  HOLLI Hunt      pantoprazole  20 mg Oral QAM Jordan C Holter, DO      polyethylene glycol  17 g Oral Daily PRN HOLLI Ghotra      polyethylene glycol  17 g Oral Daily Jourdan Dickens, DO      propranolol  10 mg Oral Q12H [facility] HOLLI Lion      senna-docusate sodium  1 tablet Oral Daily PRN Jordan C Holter, DO      senna-docusate sodium  2 tablet Oral HS Jourdan Dickens, DO      traZODone  50 mg Oral HS PRN HOLLI Lion      white petrolatum-mineral oil   Topical TID PRN HOLLI Lion         Counseling / Coordination of Care: Total floor / unit time spent today 15 minutes. Greater than 50% of total time was spent with the patient and / or family counseling and / or somewhat receptive to supportive listening and teaching positive coping skills to deal with symptom mangement.     Patient's Rights, confidentiality and exceptions to confidentiality, use of automated medical record, Behavioral Health Services staff access to medical record, and consent to treatment reviewed.    This note has been dictated and hence there may be problems with punctuation, spelling and formatting and if anyone has any concerns please address them to Dr. Rosario   This note is not shared with patient due to potential for making patient's condition worse by knowing the content of the note.

## 2024-12-12 NOTE — ASSESSMENT & PLAN NOTE
Reviewed 12/12/24  Follows with medical  Continue famotidine 20 mg tablet BID per medical team   Continue glycopyrrolate 1 mg tablet BID AM and afternoon per medical team  Continue glycopyrrolate 2 mg tablet before bed per medical team  Continue pantoprazole EC 20 mg tablet every morning per medical team  No associated orders from this encounter found during lookback period of 72 hours.

## 2024-12-12 NOTE — ASSESSMENT & PLAN NOTE
Reviewed 12/12/24, on nicotine gum as as needed and encouraged smoking cessation  Continue to encourage cessation upon discharge  No associated orders from this encounter found during lookback period of 72 hours.

## 2024-12-12 NOTE — ASSESSMENT & PLAN NOTE
Reviewed on 12/12/2024.  Continues to hear the same threatening voices that tell him that they are going to kill him and his family and that he is going to go to hell but he has been hearing them for more than 3 years and they are never commanding.  This makes him feel somewhat depressed and now he is beginning to realize that he needs to learn coping skills to better learn to live with all.  Continues to feel safe and not acting up and he is engaging in walks around the unit and interacts with select peers and tolerating the recent increase in clozapine but has not responded to higher doses of clozapine even in the past      Clozapine 225 mg to 250 mg daily at bedtime for psychosis as of 12/11/2024-- to consider further careful titration for Sx's depending on response  Continue CBC with differential every 2 weeks with CK and another 2 weeks to check for any further increase in CK due to prior history of increase in CK from clozapine and CK was today 960 and medical encouraged him to drink fluids and he has agreed to force fluids    Continue Abilify 30 mg once daily for psychosis as he  is resistant to single antipsychotic  Continue Escitalopram 20 mg once daily for depression and anxiety  Continue Senna-Docusate sodium one 50 mg tablet daily before bed for constipation  Continue Robinul for drooling from Clozapine  Continue Propanolol 10 mg twice daily for anxiety     Pt remains on dual antipsychotic Tx due to failure on monotherapy      : ACT team referral was made and he is still living back with his aunt once MA funding comes through from the [facility]  No associated orders from this encounter found during lookback period of 72 hours.

## 2024-12-12 NOTE — NURSING NOTE
Pt is calm, cooperative and visible on milieu. Pt reports AH, depression and anxiety; denies SI/HI/VH. Compliant with medications and meals. Fluids encouraged due to elevated CK level. Social with staff and select peers. Q 15 min checks maintained.

## 2024-12-12 NOTE — ASSESSMENT & PLAN NOTE
Reviewed 12/12/24  Follows with medical team on hydrochlorothiazide and Norvasc  No associated orders from this encounter found during lookback period of 72 hours.

## 2024-12-12 NOTE — ASSESSMENT & PLAN NOTE
Reviewed 12/12/24  Follows with medical team  No associated orders from this encounter found during lookback period of 72 hours.

## 2024-12-12 NOTE — PLAN OF CARE
Problem: Alteration in Thoughts and Perception  Goal: Treatment Goal: Gain control of psychotic behaviors/thinking, reduce/eliminate presenting symptoms and demonstrate improved reality functioning upon discharge  Outcome: Progressing  Goal: Agree to be compliant with medication regime, as prescribed and report medication side effects  Description: Interventions:  - Offer appropriate PRN medication and supervise ingestion; conduct AIMS, as needed   Outcome: Progressing  Goal: Attend and participate in unit activities, including therapeutic, recreational, and educational groups  Description: Interventions:  -Encourage Visitation and family involvement in care  Outcome: Progressing  Goal: Recognize dysfunctional thoughts, communicate reality-based thoughts at the time of discharge  Description: Interventions:  - Provide medication and psycho-education to assist patient in compliance and developing insight into his/her illness   Outcome: Progressing  Goal: Complete daily ADLs, including personal hygiene independently, as able  Description: Interventions:  - Observe, teach, and assist patient with ADLS  - Monitor and promote a balance of rest/activity, with adequate nutrition and elimination   Outcome: Progressing     Problem: Ineffective Coping  Goal: Identifies ineffective coping skills  Outcome: Progressing  Goal: Patient/Family participate in treatment and DC plans  Description: Interventions:  - Provide therapeutic environment  Outcome: Progressing  Goal: Patient/Family verbalizes awareness of resources  Outcome: Progressing     Problem: Depression  Goal: Treatment Goal: Demonstrate behavioral control of depressive symptoms, verbalize feelings of improved mood/affect, and adopt new coping skills prior to discharge  Outcome: Progressing  Goal: Verbalize thoughts and feelings  Description: Interventions:  - Assess and re-assess patient's level of risk   - Engage patient in 1:1 interactions, daily, for a minimum of  15 minutes   - Encourage patient to express feelings, fears, frustrations, hopes   Outcome: Progressing  Goal: Refrain from harming self  Description: Interventions:  - Monitor patient closely, per order   - Supervise medication ingestion, monitor effects and side effects   Outcome: Progressing  Goal: Refrain from isolation  Description: Interventions:  - Develop a trusting relationship   - Encourage socialization   Outcome: Progressing  Goal: Refrain from self-neglect  Outcome: Progressing  Goal: Attend and participate in unit activities, including therapeutic, recreational, and educational groups  Description: Interventions:  - Provide therapeutic and educational activities daily, encourage attendance and participation, and document same in the medical record   Outcome: Progressing     Problem: Anxiety  Goal: Anxiety is at manageable level  Description: Interventions:  - Assess and monitor patient's anxiety level.   - Monitor for signs and symptoms (heart palpitations, chest pain, shortness of breath, headaches, nausea, feeling jumpy, restlessness, irritable, apprehensive).   - Collaborate with interdisciplinary team and initiate plan and interventions as ordered.  - [place] patient to unit/surroundings  - Explain treatment plan  - Encourage participation in care  - Encourage verbalization of concerns/fears  - Identify coping mechanisms  - Assist in developing anxiety-reducing skills  - Administer/offer alternative therapies  - Limit or eliminate stimulants  Outcome: Progressing     Problem: Alteration in Orientation  Goal: Treatment Goal: Demonstrate a reduction of confusion and improved orientation to person, place, time and/or situation upon discharge, according to optimum baseline/ability  Outcome: Progressing  Goal: Interact with staff daily  Description: Interventions:  - Assess and re-assess patient's level of orientation  - Engage patient in 1 on 1 interactions, daily, for a minimum of 15 minutes   -  Establish rapport/trust with patient   Outcome: Progressing  Goal: Complete daily ADLs, including personal hygiene independently, as able  Description: Interventions:  - Observe, teach, and assist patient with ADLS  Outcome: Progressing     Problem: DISCHARGE PLANNING - CARE MANAGEMENT  Goal: Discharge to post-acute care or home with appropriate resources  Description: INTERVENTIONS:  - Conduct assessment to determine patient/family and health care team treatment goals, and need for post-acute services based on payer coverage, community resources, and patient preferences, and barriers to discharge  - Address psychosocial, clinical, and financial barriers to discharge as identified in assessment in conjunction with the patient/family and health care team  - Arrange appropriate level of post-acute services according to patient's   needs and preference and payer coverage in collaboration with the physician and health care team  - Communicate with and update the patient/family, physician, and health care team regarding progress on the discharge plan  - Arrange appropriate transportation to post-acute venues  Outcome: Progressing     Problem: Alteration in Thoughts and Perception  Goal: Refrain from acting on delusional thinking/internal stimuli  Description: Interventions:  - Monitor patient closely, per order   - Utilize least restrictive measures   - Set reasonable limits, give positive feedback for acceptable   - Administer medications as ordered and monitor of potential side effects  Outcome: Not Progressing

## 2024-12-12 NOTE — TREATMENT TEAM
12/12/24 0808   Provider Notification   Reason for Communication Evaluate   Provider Name BRITNEY Jensen   Provider Role Hospitalist   Method of Communication Other (Comment)  (epic chat)   Response Waiting for response   Notification Time 0807     Provider made aware of pt's elevated CK level.

## 2024-12-12 NOTE — ASSESSMENT & PLAN NOTE
Reviewed 12/12/24  Ongoing evaluation by medical team  No associated orders from this encounter found during lookback period of 72 hours.

## 2024-12-12 NOTE — ASSESSMENT & PLAN NOTE
Reviewed 12/12/24  Follows with medical team  Continue senna-docusate sodium dose per medical team.   No associated orders from this encounter found during lookback period of 72 hours.

## 2024-12-13 LAB — CK SERPL-CCNC: 905 U/L (ref 39–308)

## 2024-12-13 PROCEDURE — 82550 ASSAY OF CK (CPK): CPT | Performed by: PSYCHIATRY & NEUROLOGY

## 2024-12-13 PROCEDURE — 99232 SBSQ HOSP IP/OBS MODERATE 35: CPT | Performed by: PSYCHIATRY & NEUROLOGY

## 2024-12-13 RX ADMIN — GLYCOPYRROLATE 2 MG: 1 TABLET ORAL at 21:13

## 2024-12-13 RX ADMIN — GLYCOPYRROLATE 1 MG: 1 TABLET ORAL at 14:06

## 2024-12-13 RX ADMIN — PROPRANOLOL HYDROCHLORIDE 10 MG: 10 TABLET ORAL at 08:47

## 2024-12-13 RX ADMIN — CLOZAPINE 250 MG: 25 TABLET ORAL at 21:14

## 2024-12-13 RX ADMIN — NICOTINE POLACRILEX 2 MG: 2 GUM, CHEWING ORAL at 21:14

## 2024-12-13 RX ADMIN — GLYCOPYRROLATE 1 MG: 1 TABLET ORAL at 08:47

## 2024-12-13 RX ADMIN — PANTOPRAZOLE SODIUM 20 MG: 20 TABLET, DELAYED RELEASE ORAL at 08:46

## 2024-12-13 RX ADMIN — AMLODIPINE BESYLATE 5 MG: 5 TABLET ORAL at 08:46

## 2024-12-13 RX ADMIN — ARIPIPRAZOLE 30 MG: 15 TABLET ORAL at 08:47

## 2024-12-13 RX ADMIN — LORATADINE 10 MG: 10 TABLET ORAL at 08:47

## 2024-12-13 RX ADMIN — DOCUSATE SODIUM 100 MG: 100 CAPSULE, LIQUID FILLED ORAL at 17:15

## 2024-12-13 RX ADMIN — DOCUSATE SODIUM 100 MG: 100 CAPSULE, LIQUID FILLED ORAL at 08:46

## 2024-12-13 RX ADMIN — SENNOSIDES AND DOCUSATE SODIUM 2 TABLET: 50; 8.6 TABLET ORAL at 21:13

## 2024-12-13 RX ADMIN — NICOTINE POLACRILEX 2 MG: 2 GUM, CHEWING ORAL at 17:15

## 2024-12-13 RX ADMIN — HYDROCHLOROTHIAZIDE 12.5 MG: 12.5 TABLET ORAL at 08:48

## 2024-12-13 RX ADMIN — ESCITALOPRAM OXALATE 20 MG: 10 TABLET, FILM COATED ORAL at 08:47

## 2024-12-13 RX ADMIN — ATROPINE SULFATE 1 DROP: 10 SOLUTION/ DROPS OPHTHALMIC at 21:14

## 2024-12-13 RX ADMIN — PROPRANOLOL HYDROCHLORIDE 10 MG: 10 TABLET ORAL at 21:13

## 2024-12-13 RX ADMIN — MULTIPLE VITAMINS W/ MINERALS TAB 1 TABLET: TAB ORAL at 08:48

## 2024-12-13 RX ADMIN — NICOTINE POLACRILEX 2 MG: 2 GUM, CHEWING ORAL at 08:46

## 2024-12-13 RX ADMIN — MELATONIN TAB 3 MG 9 MG: 3 TAB at 21:12

## 2024-12-13 NOTE — ASSESSMENT & PLAN NOTE
Reviewed 12/13/24  Follows with medical  Continue famotidine 20 mg tablet BID per medical team   Continue glycopyrrolate 1 mg tablet BID AM and afternoon per medical team  Continue glycopyrrolate 2 mg tablet before bed per medical team  Continue pantoprazole EC 20 mg tablet every morning per medical team  No associated orders from this encounter found during lookback period of 72 hours.

## 2024-12-13 NOTE — NURSING NOTE
Alberto reports feeling depressed, anxious and hearing voices.  They are non-commanding, telling him that they are going to kill him and his family.  Pt is aware that he is in a safe environment.  Pt is visible and social with peers and staff.  Med and meal compliant.  No behavioral issues.

## 2024-12-13 NOTE — PLAN OF CARE
Problem: Alteration in Thoughts and Perception  Goal: Treatment Goal: Gain control of psychotic behaviors/thinking, reduce/eliminate presenting symptoms and demonstrate improved reality functioning upon discharge  Outcome: Progressing  Goal: Agree to be compliant with medication regime, as prescribed and report medication side effects  Description: Interventions:  - Offer appropriate PRN medication and supervise ingestion; conduct AIMS, as needed   Outcome: Progressing  Goal: Attend and participate in unit activities, including therapeutic, recreational, and educational groups  Description: Interventions:  -Encourage Visitation and family involvement in care  Outcome: Progressing  Goal: Complete daily ADLs, including personal hygiene independently, as able  Description: Interventions:  - Observe, teach, and assist patient with ADLS  - Monitor and promote a balance of rest/activity, with adequate nutrition and elimination   Outcome: Progressing     Problem: Ineffective Coping  Goal: Participates in unit activities  Description: Interventions:  - Provide therapeutic environment   - Provide required programming   - Redirect inappropriate behaviors   Outcome: Progressing  Goal: Patient/Family participate in treatment and DC plans  Description: Interventions:  - Provide therapeutic environment  Outcome: Progressing     Problem: Depression  Goal: Treatment Goal: Demonstrate behavioral control of depressive symptoms, verbalize feelings of improved mood/affect, and adopt new coping skills prior to discharge  Outcome: Progressing     Problem: Anxiety  Goal: Anxiety is at manageable level  Description: Interventions:  - Assess and monitor patient's anxiety level.   - Monitor for signs and symptoms (heart palpitations, chest pain, shortness of breath, headaches, nausea, feeling jumpy, restlessness, irritable, apprehensive).   - Collaborate with interdisciplinary team and initiate plan and interventions as ordered.  - [place]  patient to unit/surroundings  - Explain treatment plan  - Encourage participation in care  - Encourage verbalization of concerns/fears  - Identify coping mechanisms  - Assist in developing anxiety-reducing skills  - Administer/offer alternative therapies  - Limit or eliminate stimulants  Outcome: Progressing     Problem: Alteration in Orientation  Goal: Interact with staff daily  Description: Interventions:  - Assess and re-assess patient's level of orientation  - Engage patient in 1 on 1 interactions, daily, for a minimum of 15 minutes   - Establish rapport/trust with patient   Outcome: Progressing  Goal: Express concerns related to confused thinking related to:  Description: Interventions:  - Encourage patient to express feelings, fears, frustrations, hopes  - Assign consistent caregivers   - [place]/re-orient patient as needed  - Allow comfort items, as appropriate  - Provide visual cues, signs, etc.   Outcome: Progressing  Goal: Allow medical examinations, as recommended  Description: Interventions:  - Provide physical/neurological exams and/or referrals, per provider   Outcome: Progressing  Goal: Cooperate with recommended testing/procedures  Description: Interventions:  - Determine need for ancillary testing  - Observe for mental status changes  - Implement falls/precaution protocol   Outcome: Progressing     Problem: DISCHARGE PLANNING - CARE MANAGEMENT  Goal: Discharge to post-acute care or home with appropriate resources  Description: INTERVENTIONS:  - Conduct assessment to determine patient/family and health care team treatment goals, and need for post-acute services based on payer coverage, community resources, and patient preferences, and barriers to discharge  - Address psychosocial, clinical, and financial barriers to discharge as identified in assessment in conjunction with the patient/family and health care team  - Arrange appropriate level of post-acute services according to patient's   needs and  preference and payer coverage in collaboration with the physician and health care team  - Communicate with and update the patient/family, physician, and health care team regarding progress on the discharge plan  - Arrange appropriate transportation to post-acute venues  Outcome: Progressing

## 2024-12-13 NOTE — ASSESSMENT & PLAN NOTE
Reviewed on 12/13/2024.  Continues to report hearing same voices threatening to kill him and sending him to hell when he gets out but not at all commanding.  CK was high yesterday and agrees to have a repeat level today..  Tolerating the increase in Clozapine 225 mg to 250 mg daily at bedtime for psychosis as of 12/11/2024-- to consider further careful titration for Sx's depending on response  Continue CBC with differential every 2 weeks with CK and another 2 weeks to check for any further increase in CK due to prior history of increase in CK from clozapine and CK was today 960 and medical encouraged him to drink fluids and he has agreed to force fluids    Continue Abilify 30 mg once daily for psychosis as he  is resistant to single antipsychotic  Continue Escitalopram 20 mg once daily for depression and anxiety  Continue Senna-Docusate sodium one 50 mg tablet daily before bed for constipation  Continue Robinul for drooling from Clozapine  Continue Propanolol 10 mg twice daily for anxiety     Pt remains on dual antipsychotic Tx due to failure on monotherapy      : ACT team referral was made for him living back with his aunt once MA funding comes through from the [facility]  No associated orders from this encounter found during lookback period of 72 hours.

## 2024-12-13 NOTE — ASSESSMENT & PLAN NOTE
Reviewed 12/13/24  Follows with medical team and given dietary counseling and need for exercise  No associated orders from this encounter found during lookback period of 72 hours.

## 2024-12-13 NOTE — NURSING NOTE
Received pt in bed at change of shift with eyes closed; chest movement noted.  Continues the same thus this far as per q 15 min room checks.   Will continue to monitor behavior, sleeping pattern and any medical issues that may arise.    0549:  Sleeping 7+ hrs thus this far

## 2024-12-13 NOTE — ASSESSMENT & PLAN NOTE
Reviewed 12/13/24  Follows with medical team  Continue senna-docusate sodium dose per medical team.   No associated orders from this encounter found during lookback period of 72 hours.

## 2024-12-13 NOTE — PROGRESS NOTES
Progress Note - Behavioral Health   Name: Alberto Berumen 29 y.o. male I MRN: [id number]  Unit/Bed#: [facility] 112-02 I Date of Admission: 3/29/2024   Date of Service: 12/13/2024 I Hospital Day: 259     Assessment & Plan  Schizoaffective disorder, bipolar type (HCC)    Reviewed on 12/13/2024.  Continues to report hearing same voices threatening to kill him and sending him to hell when he gets out but not at all commanding.  CK was high yesterday and agrees to have a repeat level today..  Tolerating the increase in Clozapine 225 mg to 250 mg daily at bedtime for psychosis as of 12/11/2024-- to consider further careful titration for Sx's depending on response  Continue CBC with differential every 2 weeks with CK and another 2 weeks to check for any further increase in CK due to prior history of increase in CK from clozapine and CK was today 960 and medical encouraged him to drink fluids and he has agreed to force fluids    Continue Abilify 30 mg once daily for psychosis as he  is resistant to single antipsychotic  Continue Escitalopram 20 mg once daily for depression and anxiety  Continue Senna-Docusate sodium one 50 mg tablet daily before bed for constipation  Continue Robinul for drooling from Clozapine  Continue Propanolol 10 mg twice daily for anxiety     Pt remains on dual antipsychotic Tx due to failure on monotherapy      : ACT team referral was made for him living back with his aunt once MA funding comes through from the [facility]  No associated orders from this encounter found during lookback period of 72 hours.    Chronic idiopathic constipation  Reviewed 12/13/24  Follows with medical team  Continue senna-docusate sodium dose per medical team.   No associated orders from this encounter found during lookback period of 72 hours.  GERD (gastroesophageal reflux disease)  Reviewed 12/13/24  Follows with medical  Continue famotidine 20 mg tablet BID per medical team   Continue glycopyrrolate 1 mg tablet  BID AM and afternoon per medical team  Continue glycopyrrolate 2 mg tablet before bed per medical team  Continue pantoprazole EC 20 mg tablet every morning per medical team  No associated orders from this encounter found during lookback period of 72 hours.  Medical clearance for psychiatric admission  Reviewed 12/13/24  Ongoing evaluation by medical team  No associated orders from this encounter found during lookback period of 72 hours.    Tobacco abuse  Reviewed 12/13/24, on nicotine gum as as needed and encouraged smoking cessation  Continue to encourage cessation upon discharge  No associated orders from this encounter found during lookback period of 72 hours.  Elevated hemoglobin A1c  Reviewed 12/13/24  Follows with medical team  No associated orders from this encounter found during lookback period of 72 hours.  Class 2 obesity in adult  Reviewed 12/13/24  Follows with medical team and given dietary counseling and need for exercise  No associated orders from this encounter found during lookback period of 72 hours.    Primary hypertension  Reviewed 12/13/24  Follows with medical team on hydrochlorothiazide and Norvasc  No associated orders from this encounter found during lookback period of 72 hours.    Patient Active Problem List   Diagnosis    GERD (gastroesophageal reflux disease)    Medical clearance for psychiatric admission    Schizoaffective disorder, bipolar type (HCC)    Tobacco abuse    T wave inversion in EKG    Syringoma    Chronic idiopathic constipation    Vitamin B 12 deficiency    Vitamin D deficiency    Confluent and reticulate papillomatosis    Class 2 obesity in adult    Primary hypertension    Elevated hemoglobin A1c    Bilateral lower extremity edema     Review of systems: Unremarkable   assessment  Overall Status: Status quo with no significant changes  certification Statement: The patient will continue to require additional inpatient hospital stay due to ongoing voices that are threatening  to mixing lack of response to medications and ECT until eligible for an ACT team to live with his aunt.     Medications:  propranolol 10 mg every 12 hours  for anxiety, Abilify 30 mg mg at bedtime Lexapro 20 mg once a day,  and senna once a day day for constipation clozapine 250 mg at bedtime, Robinul 1 mg twice a day and 2 mg at bedtime for drooling from Clozapine, Colace 100 mg twice a day with MiraLAX daily for constipation from Clozapine  All medications were reviewed  side effects from treatment: None reported  Medication changes   Clozapine increased to 50 mg yesterday  Medication education  Risks side effects benefits and precautions of medications discussed with patient and he did verbalize an understanding about risks for metabolic syndrome from being on neuroleptics and risk for tardive dyskinesia etc.   Understanding of medications: Has some understanding  Justification for dual anti-psychotics: due to not responding to single antipsychotics    Non-pharmacological treatments  Continue with individual, group, milieu and occupational therapy using recovery principles and psycho-education about accepting illness and the need for treatment.  Waiting for [facility] to fund an ACT team as he lost his MA benefits and to live with aunt in the [facility]  Maintenance ECTs  completed several months ago  Refuses to see a dietician and agrees to do more exercises as he is about 100 lbs overweight   Prolactin level high so Risperdal replaced with Abilify which he has tolerated well so far with prolactin level back to normal  Counseled to take the stool softeners and laxatives regularly    Safety  Safety and communication plan established to target dynamic risk factors discussed above.    Discharge Plan back to his aunt when [facility] starts funding for ACT program as his medical assistance benefits are no longer available since he is on Medicare  Interval Progress   Continues to report same voices as before that are threatening  never commanding and he is trying to learn to live with them as usual.  He claims he is drinking enough fluids and willing to get his CK repeated.  He has not been aggressive or agitated or threatening or self-abusive with no need for behavioral PRNs in the last 24 hours.  Socializes with select peers and using a punching bag.  Has not felt suicidal but still somewhat depressed over his symptoms but understands he has been having them for more than 3 years and has no choice other than to learn to learn to live with them      acceptance by patient: Accepting  Hopefulness in recovery: Living with his aunt  Involved in reintegration process: Talking and meeting with his siblings community [facility] and with his aunt from [facility]  trusting in relationship with psychiatrist: Trusting  Sleep: Good  Appetite: Good  Compliance with Medications: Good  Group attendance: Attending most of the groups 8/10  Significant events and progress towards goals: Status quo    Mental Status Exam  Appearance: age appropriate, improved grooming, looks older than stated age, overweight, with hair in dreadlocks casually dressed with a clean shaven face, fairly groomed with good eye contact found pacing the hallway with good eye contact well-groomed  behavior: Friendly cooperative pleasant continues to report some sadness as usual  speech: normal rate, normal volume, normal pitch  Mood: dysphoric, anxious, continues to report feeling very good because of the voices that are never going away  affect: constricted, inappropriate, mood-congruent, with no good mood reactivity .    thought Process: organized, logical, coherent, goal directed, linear, decreased rate of thoughts, slowing of thoughts, negative thinking, impaired abstract reasoning, concrete  Thought Content: paranoid ideation, some paranoia, grandiose ideas, intrusive thoughts, preoccupied, chronic, continues to report paranoia about people threatening to kill him and his family because of  the voices.  He believes ECT has helped so that he is not much in his head.  No other delusions elicited.  No current suicidal or homicidal thoughts and no plans verbalized but today reports having passive death wishes because of voices with no plans and able to CFS and it has not changed yet  .  No phobias obsessions compulsions or distorted body perceptions reported.   Perceptual Disturbances: Still reporting same threatening voices as before not commanding Hx Risk Factors: chronic psychiatric problems, chronic anxiety symptoms, chronic psychotic symptoms,    Sensorium: Oriented x 3 spheres and situation  Attention and concentration span: Intact  Cognition: recent and remote memory grossly intact  Consciousness: alert and awake  Intellect: appears to be of average intelligence  Insight: intact  Judgement: intact  Motor Activity: no abnormal movements     Vitals  Temp:  [97.7 °F (36.5 °C)-98.2 °F (36.8 °C)] 98.2 °F (36.8 °C)  HR:  [83-97] 83  Resp:  [18-19] 18  BP: (114-132)/(60-77) 132/60  SpO2:  [97 %-100 %] 97 %  No intake or output data in the 24 hours ending 12/13/24 0832        Lab Results: All labs reviewed and prolactin level came down to normal on 8/1/2024, clozapine level 820 on 9/5/24  Current Facility-Administered Medications   Medication Dose Route Frequency Provider Last Rate    acetaminophen  650 mg Oral Q4H PRN Jordan C Holter, DO      acetaminophen  650 mg Oral Q6H PRN HOLLI Lion      aluminum-magnesium hydroxide-simethicone  30 mL Oral Q4H PRN Jordan C Holter, DO      amLODIPine  5 mg Oral Daily HOLLI Lion      ARIPiprazole  30 mg Oral Daily Bora Rosario MD      Artificial Tears  1 drop Both Eyes Q3H PRN Jordan C Holter, DO      atropine  1 drop Sublingual HS Harjeet Torres,       haloperidol lactate  2.5 mg Intramuscular Q4H PRN Max 4/day HOLLI Lion      And    LORazepam  1 mg Intramuscular Q4H PRN Max 4/day HOLLI Lion      And    benztropine  0.5 mg  Intramuscular Q4H PRN Max 4/day HOLLI Lion      benztropine  1 mg Intramuscular Q4H PRN Max 6/day Ion  Holter, DO      haloperidol lactate  5 mg Intramuscular Q4H PRN Max 4/day HOLLI Lion      And    LORazepam  2 mg Intramuscular Q4H PRN Max 4/day STEVE LionNP      And    benztropine  1 mg Intramuscular Q4H PRN Max 4/day HOLLI Lion      benztropine  1 mg Oral Q4H PRN Max 6/day HOLLI Lion      benztropine  1 mg Oral Q4H PRN Max 6/day Ion  Holter, DO      bisacodyl  10 mg Rectal Daily PRN HOLLI Lion      calcium carbonate  500 mg Oral BID PRN HOLLI Monge      cloZAPine  250 mg Oral HS Bora Rosario MD      hydrOXYzine HCL  50 mg Oral Q6H PRN Max 4/day [facility] Holter, DO      Or    diphenhydrAMINE  50 mg Intramuscular Q6H PRN Ion  Holter, DO      hydrOXYzine HCL  50 mg Oral Q6H PRN Max 4/day HOLLI Lion      Or    diphenhydrAMINE  50 mg Intramuscular Q6H PRN HOLLI Lion      diphenhydrAMINE-zinc acetate   Topical BID PRN HOLLI Lion      docusate sodium  100 mg Oral BID Jourdan Dickens, DO      escitalopram  20 mg Oral Daily HOLLI Lion      famotidine  20 mg Oral BID PRN HOLLI Monge      fluticasone  1 spray Each Nare Daily PRN HOLLI Monge      glycopyrrolate  1 mg Oral BID (AM & Afternoon) Bora Rosario MD      glycopyrrolate  2 mg Oral HS Bora Rosario MD      haloperidol  1 mg Oral Q6H PRN HOLLI Lion      haloperidol  2.5 mg Oral Q4H PRN Max 4/day HOLLI Lion      haloperidol  5 mg Oral Q4H PRN Max 4/day HOLLI Lion      hydroCHLOROthiazide  12.5 mg Oral Daily Pia Mantilla, HOLLI      hydrocortisone   Topical 4x Daily PRN HOLLI Lion      hydrOXYzine HCL  100 mg Oral Q6H PRN Max 4/day Jordan C Holter, DO      Or    LORazepam  2 mg Intramuscular Q6H PRN Jordan C Holter, DO      hydrOXYzine HCL  100 mg Oral Q6H PRN Max 4/day HOLLI Lion       Or    LORazepam  2 mg Intramuscular Q6H PRN HOLLI Lion      hydrOXYzine HCL  25 mg Oral Q6H PRN Max 4/day [facility] Holter, DO      ibuprofen  600 mg Oral Q8H PRN HOLLI Lion      influenza vaccine  0.5 mL Intramuscular Once Bora Rosario MD      loratadine  10 mg Oral Daily Brina Guillen MD      magnesium hydroxide  30 mL Oral Once Jourdan Dickens, DO      melatonin  3 mg Oral HS PRN Bora Rosario MD      melatonin  9 mg Oral HS Bora Rosario MD      methocarbamol  500 mg Oral Q6H PRN HOLLI Lion      multivitamin-minerals  1 tablet Oral Daily Eileen Gonzaleznathan, HOLLI      nicotine polacrilex  2 mg Oral Q4H PRN Bora Rosario MD      OLANZapine  5 mg Oral Q4H PRN Max 3/day [facility] Holter, DO      Or    OLANZapine  2.5 mg Intramuscular Q4H PRN Max 3/day [facility] Holter, DO      OLANZapine  5 mg Oral Q3H PRN Max 3/day [facility] Holter, DO      Or    OLANZapine  5 mg Intramuscular Q3H PRN Max 3/day [facility] Holter, DO      OLANZapine  2.5 mg Oral Q4H PRN Max 6/day [facility] Holter, DO      ondansetron  4 mg Oral Q6H PRN HOLLI Lion      [facility]antoprazole  [facility]      polyethylene glycol  17 g Oral Daily PRN HOLLI Ghotra      polyethylene glycol  17 g Oral Daily Jourdan Dickens, DO      propranolol  10 mg Oral Q12H KAYLIE HOLLI Lion      senna-docusate sodium  1 tablet Oral Daily PRN [facility] Cresencioter, DO      senna-docusate sodium  2 tablet Oral HS Jourdan Dickens, DO      traZODone  50 mg Oral HS PRN HOLLI Lion      white petrolatum-mineral oil   Topical TID PRN HOLLI Lion         Counseling / Coordination of Care: Total floor / unit time spent today 15 minutes. Greater than 50% of total time was spent with the patient and / or family counseling and / or somewhat receptive to supportive listening and teaching positive coping skills to deal with symptom mangement.     Patient's Rights, confidentiality and exceptions to  confidentiality, use of automated medical record, Behavioral Health Services staff access to medical record, and consent to treatment reviewed.    This note has been dictated and hence there may be problems with punctuation, spelling and formatting and if anyone has any concerns please address them to Dr. Rosario   This note is not shared with patient due to potential for making patient's condition worse by knowing the content of the note.  Ms. Reese occasionally medications occasionally but that she is not happy

## 2024-12-13 NOTE — ASSESSMENT & PLAN NOTE
Reviewed 12/13/24, on nicotine gum as as needed and encouraged smoking cessation  Continue to encourage cessation upon discharge  No associated orders from this encounter found during lookback period of 72 hours.

## 2024-12-13 NOTE — NURSING NOTE
Pt is calm, cooperative and visible on milieu. Pt reports AH, depression    anxiety; denies SI/HI/VH. Pt interacts appropriately with select peers and reports sleeping well. Pt is able to make needs known and is medication compliant. Q 15 min checks maintained.

## 2024-12-13 NOTE — NURSING NOTE
Patient visible all evening, social with selective peers, calm,  polite and cooperative with nurse upon approach; behaviors controlled, offered no complaints. Patient positive group attendance, currently in own room sleeping, will continue to monitor.

## 2024-12-13 NOTE — ASSESSMENT & PLAN NOTE
Reviewed 12/13/24  Follows with medical team on hydrochlorothiazide and Norvasc  No associated orders from this encounter found during lookback period of 72 hours.

## 2024-12-13 NOTE — PROGRESS NOTES
12/13/24 0812   Team Meeting   Meeting Type Daily Rounds   Team Members Present   Team Members Present Physician;Nurse;;Other (Discipline and Name)   Physician Team Member Thomas, Holter   Nursing Team Member Claudia   Social Work Team Member Jose J ORTEGA   Patient/Family Present   Patient Present No   Patient's Family Present No     Groups Participation  8/10.   Patient's compliant with medications. He's engaged in his treatment. He's pleasant and cooperative. [facility] for [facility] services to discharge home to family.

## 2024-12-13 NOTE — ASSESSMENT & PLAN NOTE
Reviewed 12/13/24  Ongoing evaluation by medical team  No associated orders from this encounter found during lookback period of 72 hours.

## 2024-12-13 NOTE — PLAN OF CARE
Problem: Ineffective Coping  Goal: Identifies ineffective coping skills  Outcome: Progressing  Goal: Identifies healthy coping skills  Outcome: Progressing  Goal: Demonstrates healthy coping skills  Outcome: Progressing  Goal: Participates in unit activities  Description: Interventions:  - Provide therapeutic environment   - Provide required programming   - Redirect inappropriate behaviors   Outcome: Progressing    Continues to participate and attend most groups.

## 2024-12-13 NOTE — ASSESSMENT & PLAN NOTE
Reviewed 12/13/24  Follows with medical team  No associated orders from this encounter found during lookback period of 72 hours.

## 2024-12-14 PROCEDURE — 99232 SBSQ HOSP IP/OBS MODERATE 35: CPT

## 2024-12-14 RX ADMIN — AMLODIPINE BESYLATE 5 MG: 5 TABLET ORAL at 08:44

## 2024-12-14 RX ADMIN — LORATADINE 10 MG: 10 TABLET ORAL at 08:45

## 2024-12-14 RX ADMIN — SENNOSIDES AND DOCUSATE SODIUM 2 TABLET: 50; 8.6 TABLET ORAL at 21:20

## 2024-12-14 RX ADMIN — NICOTINE POLACRILEX 2 MG: 2 GUM, CHEWING ORAL at 21:22

## 2024-12-14 RX ADMIN — MELATONIN TAB 3 MG 9 MG: 3 TAB at 21:20

## 2024-12-14 RX ADMIN — ARIPIPRAZOLE 30 MG: 15 TABLET ORAL at 08:44

## 2024-12-14 RX ADMIN — NICOTINE POLACRILEX 2 MG: 2 GUM, CHEWING ORAL at 12:44

## 2024-12-14 RX ADMIN — PANTOPRAZOLE SODIUM 20 MG: 20 TABLET, DELAYED RELEASE ORAL at 08:44

## 2024-12-14 RX ADMIN — NICOTINE POLACRILEX 2 MG: 2 GUM, CHEWING ORAL at 08:44

## 2024-12-14 RX ADMIN — GLYCOPYRROLATE 1 MG: 1 TABLET ORAL at 14:04

## 2024-12-14 RX ADMIN — DOCUSATE SODIUM 100 MG: 100 CAPSULE, LIQUID FILLED ORAL at 17:04

## 2024-12-14 RX ADMIN — NICOTINE POLACRILEX 2 MG: 2 GUM, CHEWING ORAL at 17:03

## 2024-12-14 RX ADMIN — PROPRANOLOL HYDROCHLORIDE 10 MG: 10 TABLET ORAL at 21:20

## 2024-12-14 RX ADMIN — PROPRANOLOL HYDROCHLORIDE 10 MG: 10 TABLET ORAL at 08:44

## 2024-12-14 RX ADMIN — MULTIPLE VITAMINS W/ MINERALS TAB 1 TABLET: TAB ORAL at 08:44

## 2024-12-14 RX ADMIN — HYDROCHLOROTHIAZIDE 12.5 MG: 12.5 TABLET ORAL at 08:44

## 2024-12-14 RX ADMIN — GLYCOPYRROLATE 2 MG: 1 TABLET ORAL at 21:20

## 2024-12-14 RX ADMIN — ESCITALOPRAM OXALATE 20 MG: 10 TABLET, FILM COATED ORAL at 08:44

## 2024-12-14 RX ADMIN — DOCUSATE SODIUM 100 MG: 100 CAPSULE, LIQUID FILLED ORAL at 08:44

## 2024-12-14 RX ADMIN — CLOZAPINE 250 MG: 25 TABLET ORAL at 21:19

## 2024-12-14 RX ADMIN — GLYCOPYRROLATE 1 MG: 1 TABLET ORAL at 08:44

## 2024-12-14 NOTE — ASSESSMENT & PLAN NOTE
Reviewed 12/15/24  Follows with medical  Continue famotidine 20 mg tablet BID per medical team   Continue glycopyrrolate 1 mg tablet BID AM and afternoon per medical team  Continue glycopyrrolate 2 mg tablet before bed per medical team  Continue pantoprazole EC 20 mg tablet every morning per medical team  No associated orders from this encounter found during lookback period of 72 hours.

## 2024-12-14 NOTE — NURSING NOTE
Patient remained visible all evening.  Watching television and ambulating the hallways with select male peer.  Pleasant upon approach. Offered no complaints.  Behaviors controlled and appropriate. Did not report any active hallucinations this evening. No prn medications requested other than Nicorette gum at this time.

## 2024-12-14 NOTE — ASSESSMENT & PLAN NOTE
Reviewed 12/15/24  Ongoing evaluation by medical team  No associated orders from this encounter found during lookback period of 72 hours.

## 2024-12-14 NOTE — ASSESSMENT & PLAN NOTE
Reviewed 12/14/24  Follows with medical team and given dietary counseling and need for exercise  No associated orders from this encounter found during lookback period of 72 hours.

## 2024-12-14 NOTE — PLAN OF CARE
Problem: Alteration in Thoughts and Perception  Goal: Treatment Goal: Gain control of psychotic behaviors/thinking, reduce/eliminate presenting symptoms and demonstrate improved reality functioning upon discharge  Outcome: Progressing  Goal: Refrain from acting on delusional thinking/internal stimuli  Description: Interventions:  - Monitor patient closely, per order   - Utilize least restrictive measures   - Set reasonable limits, give positive feedback for acceptable   - Administer medications as ordered and monitor of potential side effects  Outcome: Progressing  Goal: Agree to be compliant with medication regime, as prescribed and report medication side effects  Description: Interventions:  - Offer appropriate PRN medication and supervise ingestion; conduct AIMS, as needed   Outcome: Progressing  Goal: Attend and participate in unit activities, including therapeutic, recreational, and educational groups  Description: Interventions:  -Encourage Visitation and family involvement in care  Outcome: Progressing  Goal: Recognize dysfunctional thoughts, communicate reality-based thoughts at the time of discharge  Description: Interventions:  - Provide medication and psycho-education to assist patient in compliance and developing insight into his/her illness   Outcome: Progressing  Goal: Complete daily ADLs, including personal hygiene independently, as able  Description: Interventions:  - Observe, teach, and assist patient with ADLS  - Monitor and promote a balance of rest/activity, with adequate nutrition and elimination   Outcome: Progressing     Problem: Depression  Goal: Refrain from isolation  Description: Interventions:  - Develop a trusting relationship   - Encourage socialization   Outcome: Progressing  Goal: Refrain from self-neglect  Outcome: Progressing  Goal: Attend and participate in unit activities, including therapeutic, recreational, and educational groups  Description: Interventions:  - Provide  therapeutic and educational activities daily, encourage attendance and participation, and document same in the medical record   Outcome: Progressing     Problem: Anxiety  Goal: Anxiety is at manageable level  Description: Interventions:  - Assess and monitor patient's anxiety level.   - Monitor for signs and symptoms (heart palpitations, chest pain, shortness of breath, headaches, nausea, feeling jumpy, restlessness, irritable, apprehensive).   - Collaborate with interdisciplinary team and initiate plan and interventions as ordered.  - [place] patient to unit/surroundings  - Explain treatment plan  - Encourage participation in care  - Encourage verbalization of concerns/fears  - Identify coping mechanisms  - Assist in developing anxiety-reducing skills  - Administer/offer alternative therapies  - Limit or eliminate stimulants  Outcome: Progressing     Problem: Alteration in Orientation  Goal: Interact with staff daily  Description: Interventions:  - Assess and re-assess patient's level of orientation  - Engage patient in 1 on 1 interactions, daily, for a minimum of 15 minutes   - Establish rapport/trust with patient   Outcome: Progressing

## 2024-12-14 NOTE — ASSESSMENT & PLAN NOTE
Reviewed 12/15/24, on nicotine gum as as needed and encouraged smoking cessation  Continue to encourage cessation upon discharge  No associated orders from this encounter found during lookback period of 72 hours.

## 2024-12-14 NOTE — ASSESSMENT & PLAN NOTE
Reviewed 12/15/24  Follows with medical team  No associated orders from this encounter found during lookback period of 72 hours.

## 2024-12-14 NOTE — NURSING NOTE
He slept through breakfast. Ate lunch and dinner. Patient is visible and social with select peers on the milieu. He is pleasant and cooperative. He is complaint with medications. Continues to have auditory hallucinations. He attended groups.

## 2024-12-14 NOTE — ASSESSMENT & PLAN NOTE
Reviewed on 12/15/2024.  Patient continues to report hearing vague AH. CK level has been high and patient is encouraged to drink more fluids. No behavioral concerns.    Tolerating the increase in Clozapine 225 mg to 250 mg daily at bedtime for psychosis as of 12/11/2024-- to consider further careful titration for Sx's depending on response  Continue CBC with differential every 2 weeks with CK and another 2 weeks to check for any further increase in CK due to prior history of increase in CK from clozapine and CK was 960 yesterday and medical encouraged him to drink fluids and he has agreed to force fluids    Continue Abilify 30 mg once daily for psychosis as he  is resistant to single antipsychotic  Continue Escitalopram 20 mg once daily for depression and anxiety  Continue Senna-Docusate sodium one 50 mg tablet daily before bed for constipation  Continue Robinul for drooling from Clozapine  Continue Propanolol 10 mg twice daily for anxiety     Pt remains on dual antipsychotic Tx due to failure on monotherapy      : ACT team referral was made for him living back with his aunt once MA funding comes through from the [facility]  No associated orders from this encounter found during lookback period of 72 hours.

## 2024-12-14 NOTE — PROGRESS NOTES
Progress Note - Behavioral Health   Name: Alberto Berumen 29 y.o. male I MRN: [id number]  Unit/Bed#: [facility] 112-02 I Date of Admission: 3/29/2024   Date of Service: 12/14/2024 I Hospital Day: 260     Assessment & Plan  Schizoaffective disorder, bipolar type (HCC)    Reviewed on 12/14/2024.  Continues to report hearing same voices threatening to kill him and sending him to hell when he gets out but not at all commanding.  CK was high yesterday and agrees to have a repeat level. Tolerating the increase in Clozapine 225 mg to 250 mg daily at bedtime for psychosis as of 12/11/2024-- to consider further careful titration for Sx's depending on response  Continue CBC with differential every 2 weeks with CK and another 2 weeks to check for any further increase in CK due to prior history of increase in CK from clozapine and CK was 960 yesterday and medical encouraged him to drink fluids and he has agreed to force fluids    Continue Abilify 30 mg once daily for psychosis as he  is resistant to single antipsychotic  Continue Escitalopram 20 mg once daily for depression and anxiety  Continue Senna-Docusate sodium one 50 mg tablet daily before bed for constipation  Continue Robinul for drooling from Clozapine  Continue Propanolol 10 mg twice daily for anxiety     Pt remains on dual antipsychotic Tx due to failure on monotherapy      : ACT team referral was made for him living back with his aunt once MA funding comes through from the [facility]  No associated orders from this encounter found during lookback period of 72 hours.    Chronic idiopathic constipation  Reviewed 12/14/24  Follows with medical team  Continue senna-docusate sodium dose per medical team.   No associated orders from this encounter found during lookback period of 72 hours.  GERD (gastroesophageal reflux disease)  Reviewed 12/14/24  Follows with medical  Continue famotidine 20 mg tablet BID per medical team   Continue glycopyrrolate 1 mg tablet BID AM  and afternoon per medical team  Continue glycopyrrolate 2 mg tablet before bed per medical team  Continue pantoprazole EC 20 mg tablet every morning per medical team  No associated orders from this encounter found during lookback period of 72 hours.  Medical clearance for psychiatric admission  Reviewed 12/14/24  Ongoing evaluation by medical team  No associated orders from this encounter found during lookback period of 72 hours.    Tobacco abuse  Reviewed 12/14/24, on nicotine gum as as needed and encouraged smoking cessation  Continue to encourage cessation upon discharge  No associated orders from this encounter found during lookback period of 72 hours.  Elevated hemoglobin A1c  Reviewed 12/14/24  Follows with medical team  No associated orders from this encounter found during lookback period of 72 hours.  Class 2 obesity in adult  Reviewed 12/14/24  Follows with medical team and given dietary counseling and need for exercise  No associated orders from this encounter found during lookback period of 72 hours.    Primary hypertension  Reviewed 12/14/24  Follows with medical team on hydrochlorothiazide and Norvasc  No associated orders from this encounter found during lookback period of 72 hours.    Recommended Treatment:     Planned medication and treatment changes:    All current active medications have been reviewed  Encourage group therapy, milieu therapy and occupational therapy  Behavioral Health checks for safety monitoring  Continue close observation for safety monitoring  Continue current medications:    Current medications:  Current Facility-Administered Medications   Medication Dose Route Frequency Provider Last Rate    acetaminophen  650 mg Oral Q4H PRN Jordan C Holter,       acetaminophen  650 mg Oral Q6H PRN HOLLI Lion      aluminum-magnesium hydroxide-simethicone  30 mL Oral Q4H PRN Jordan C Holter, DO      amLODIPine  5 mg Oral Daily HOLLI Lion      ARIPiprazole  30 mg Oral Daily  Bora Rosario MD      Artificial Tears  1 drop Both Eyes Q3H PRN Jordan C Holter, DO      atropine  1 drop Sublingual HS Harjete Torres, DO      haloperidol lactate  2.5 mg Intramuscular Q4H PRN Max 4/day STEVE LionNP      And    LORazepam  1 mg Intramuscular Q4H PRN Max 4/day Eveline Hunt CRNP      And    benztropine  0.5 mg Intramuscular Q4H PRN Max 4/day Eveline Hunt CRNP      benztropine  1 mg Intramuscular Q4H PRN Max 6/day Jordan C Holter, DO      haloperidol lactate  5 mg Intramuscular Q4H PRN Max 4/day Eveline Hunt, CRNP      And    LORazepam  2 mg Intramuscular Q4H PRN Max 4/day STEVE LionNP      And    benztropine  1 mg Intramuscular Q4H PRN Max 4/day STEVE LionNP      benztropine  1 mg Oral Q4H PRN Max 6/day Eveline Hunt CRNP      benztropine  1 mg Oral Q4H PRN Max 6/day Jordan C Holter, DO      bisacodyl  10 mg Rectal Daily PRN STEVE LionNP      calcium carbonate  500 mg Oral BID PRN HOLLI Monge      cloZAPine  250 mg Oral HS Bora Rosario MD      hydrOXYzine HCL  50 mg Oral Q6H PRN Max 4/day Jordan C Holter, DO      Or    diphenhydrAMINE  50 mg Intramuscular Q6H PRN Jordan C Holter, DO      hydrOXYzine HCL  50 mg Oral Q6H PRN Max 4/day HOLLI Lion      Or    diphenhydrAMINE  50 mg Intramuscular Q6H PRN HOLLI Lion      diphenhydrAMINE-zinc acetate   Topical BID PRN HOLLI Lion      docusate sodium  100 mg Oral BID Jourdan Dickens, DO      escitalopram  20 mg Oral Daily HOLLI Lion      famotidine  20 mg Oral BID PRN HOLLI Monge      fluticasone  1 spray Each Nare Daily PRN HOLLI Monge      glycopyrrolate  1 mg Oral BID (AM & Afternoon) Bora Rosario MD      glycopyrrolate  2 mg Oral HS Bora Rosario MD      haloperidol  1 mg Oral Q6H PRN HOLLI Lion      haloperidol  2.5 mg Oral Q4H PRN Max 4/day HOLLI Lion      haloperidol  5 mg Oral Q4H PRN Max 4/day HOLLI Lion       hydroCHLOROthiazide  12.5 mg Oral Daily Pia Mantilla, HOLLI      hydrocortisone   Topical 4x Daily PRN HOLLI Lion      hydrOXYzine HCL  100 mg Oral Q6H PRN Max 4/day [facility] Holter, DO      Or    LORazepam  2 mg Intramuscular Q6H PRN [facility] Holter, DO      hydrOXYzine HCL  100 mg Oral Q6H PRN Max 4/day HOLLI Lion      Or    LORazepam  2 mg Intramuscular Q6H PRN HOLLI Lion      hydrOXYzine HCL  25 mg Oral Q6H PRN Max 4/day [facility] Holter, DO      ibuprofen  600 mg Oral Q8H PRN HOLLI Lion      influenza vaccine  0.5 mL Intramuscular Once Bora Rosario MD      loratadine  10 mg Oral Daily Brina Guillen MD      magnesium hydroxide  30 mL Oral Once Jourdan Dickens, DO      melatonin  3 mg Oral HS PRN Bora Rosario MD      melatonin  9 mg Oral HS Bora Rosario MD      methocarbamol  500 mg Oral Q6H PRN HOLLI Lion      multivitamin-minerals  1 tablet Oral Daily HOLLI Monge      nicotine polacrilex  2 mg Oral Q4H PRN Bora Rosario MD      OLANZapine  5 mg Oral Q4H PRN Max 3/day [facility] Holter, DO      Or    OLANZapine  2.5 mg Intramuscular Q4H PRN Max 3/day [facility] Holter, DO      OLANZapine  5 mg Oral Q3H PRN Max 3/day [facility] Holter, DO      Or    OLANZapine  5 mg Intramuscular Q3H PRN Max 3/day [facility] Holter, DO      OLANZapine  2.5 mg Oral Q4H PRN Max 6/day [facility] Holter, DO      ondansetron  4 mg Oral Q6H PRN HOLLI Lion      [facility]oprazole  [facility]      polyethylene glycol  17 g Oral Daily PRN HOLLI Ghotra      polyethylene glycol  17 g Oral Daily Jourdan Dickens, DO      propranolol  10 mg Oral Q12H KAYLIE HOLLI Lion      senna-docusate sodium  1 tablet Oral Daily PRN [facility] Holter, DO      senna-docusate sodium  2 tablet Oral HS Jourdan Dickens, DO      traZODone  50 mg Oral HS PRN HOLLI Lion      white petrolatum-mineral oil   Topical TID PRN HOLLI Lion         Risks / Benefits  "of Treatment:    Risks, benefits, and possible side effects of medications explained to patient and patient verbalizes understanding and agreement for treatment.    Subjective:    Patient was seen today for continuation of care, records reviewed and patient was discussed with the morning case review team. Per nursing report, patient had an elevated CK at 906 yesterday and patient is encouraged to drink fluids. He has been medication/meal compliant. Last shower was recorded on 12/11. Last bowel movement recorded on 12/13. No behavioral concerns. No acute events in the past 24 hours.    Alberto was seen today for psychiatric follow-up. On assessment today, Alberto was approached while standing in the hallway. He states that his mood today is \"okay\". Sleep has been good. Denies any issues with appetite or energy levels. He states that he continues to experience anxiety and depression but they have been manageable. Patient states that he continues to hear voices but he is sometimes able to distract himself. He denies having any major questions or concerns to discuss with this writer at this time.    Alberto denies acute suicidal/self-harm ideation/intent/plan upon direct inquiry at this time. Alberto remains behaviorally appropriate, no agitation or aggression noted on exam or in report. Alberto also denies HI/VH, and does not appear overtly manic. No overt delusions or paranoia are verbalized. Alberto remains adherent to his current psychotropic medication regimen and denies any side effects from medications, as well as none noted on exam.    Behavior over the last 24 hours: unchanged.     Sleep: Adequate  Appetite: Adequate  Medication side effects: Denies  ROS: no complaints    Mental Status Evaluation:    Appearance:  age appropriate, casually dressed   Behavior:  cooperative, calm   Speech:  normal rate and volume   Mood:  depressed, anxious   Affect:  constricted   Thought Process:  goal directed, linear "   Associations: circumstantial associations   Thought Content:  paranoid ideation, intrusive thoughts   Perceptual Disturbances: no visual hallucinations, vague auditory hallucinations   Risk Potential: Suicidal ideation - None at present  Homicidal ideation - None at present  Potential for aggression - Not at present   Sensorium:  oriented to person, place, time/date, and situation   Memory:  recent and remote memory grossly intact   Consciousness:  alert and awake   Attention/Concentration: attention span and concentration are age appropriate   Insight:  age appropriate   Judgment: age appropriate   Gait/Station: normal gait/station   Motor Activity: no abnormal movements     Vital signs in last 24 hours:    Temp:  [97.7 °F (36.5 °C)-98.3 °F (36.8 °C)] 98.3 °F (36.8 °C)  HR:  [] 86  BP: (118-133)/(59-77) 133/77  Resp:  [18] 18  SpO2:  [97 %-100 %] 100 %    Laboratory results: I have personally reviewed all pertinent laboratory/tests results    Results from the past 24 hours: No results found for this or any previous visit (from the past 24 hours).    Suicide/Homicide Risk Assessment:    Risk of Harm to Self:   Nursing Suicide Risk Assessment Last 24 hours: C-SSRS Risk (Since Last Contact)  Calculated C-SSRS Risk Score (Since Last Contact): No Risk Indicated    Risk of Harm to Others:  Nursing Homicide Risk Assessment: Violence Risk to Others: Denies within past 6 months    The following interventions are recommended: Behavioral Health checks for safety monitoring    Progress Toward Goals: progressing    Counseling / Coordination of Care:    Total floor / unit time spent today 15 minutes. Greater than 50% of total time was spent with the patient and / or family counseling and / or coordination of care. A description of counseling / coordination of care:  Patient's progress reviewed with nursing staff.  Medication education provided to patient.    This note was not shared with the patient due to reasonable  likelihood of causing patient harm    Eveline Anglin PA-C 12/14/24

## 2024-12-14 NOTE — ASSESSMENT & PLAN NOTE
Reviewed 12/14/24  Follows with medical team  Continue senna-docusate sodium dose per medical team.   No associated orders from this encounter found during lookback period of 72 hours.

## 2024-12-14 NOTE — ASSESSMENT & PLAN NOTE
Reviewed 12/15/24  Follows with medical team  Continue senna-docusate sodium dose per medical team.   No associated orders from this encounter found during lookback period of 72 hours.

## 2024-12-14 NOTE — ASSESSMENT & PLAN NOTE
Reviewed 12/15/24  Follows with medical team and given dietary counseling and need for exercise  No associated orders from this encounter found during lookback period of 72 hours.

## 2024-12-14 NOTE — ASSESSMENT & PLAN NOTE
Reviewed 12/14/24  Ongoing evaluation by medical team  No associated orders from this encounter found during lookback period of 72 hours.

## 2024-12-14 NOTE — ASSESSMENT & PLAN NOTE
Reviewed 12/14/24  Follows with medical team  No associated orders from this encounter found during lookback period of 72 hours.

## 2024-12-14 NOTE — PROGRESS NOTES
Progress Note - Behavioral Health   Name: Alberto Berumen 29 y.o. male I MRN: [id number]  Unit/Bed#: [facility] 112-02 I Date of Admission: 3/29/2024   Date of Service: 12/15/2024 I Hospital Day: 261     Assessment & Plan  Schizoaffective disorder, bipolar type (HCC)  Reviewed on 12/15/2024.  Patient continues to report hearing vague AH. CK level has been high and patient is encouraged to drink more fluids. No behavioral concerns.    Tolerating the increase in Clozapine 225 mg to 250 mg daily at bedtime for psychosis as of 12/11/2024-- to consider further careful titration for Sx's depending on response  Continue CBC with differential every 2 weeks with CK and another 2 weeks to check for any further increase in CK due to prior history of increase in CK from clozapine and CK was 960 yesterday and medical encouraged him to drink fluids and he has agreed to force fluids    Continue Abilify 30 mg once daily for psychosis as he  is resistant to single antipsychotic  Continue Escitalopram 20 mg once daily for depression and anxiety  Continue Senna-Docusate sodium one 50 mg tablet daily before bed for constipation  Continue Robinul for drooling from Clozapine  Continue Propanolol 10 mg twice daily for anxiety     Pt remains on dual antipsychotic Tx due to failure on monotherapy      : ACT team referral was made for him living back with his aunt once MA funding comes through from the [facility]  No associated orders from this encounter found during lookback period of 72 hours.    Chronic idiopathic constipation  Reviewed 12/15/24  Follows with medical team  Continue senna-docusate sodium dose per medical team.   No associated orders from this encounter found during lookback period of 72 hours.  GERD (gastroesophageal reflux disease)  Reviewed 12/15/24  Follows with medical  Continue famotidine 20 mg tablet BID per medical team   Continue glycopyrrolate 1 mg tablet BID AM and afternoon per medical team  Continue  glycopyrrolate 2 mg tablet before bed per medical team  Continue pantoprazole EC 20 mg tablet every morning per medical team  No associated orders from this encounter found during lookback period of 72 hours.  Medical clearance for psychiatric admission  Reviewed 12/15/24  Ongoing evaluation by medical team  No associated orders from this encounter found during lookback period of 72 hours.    Tobacco abuse  Reviewed 12/15/24, on nicotine gum as as needed and encouraged smoking cessation  Continue to encourage cessation upon discharge  No associated orders from this encounter found during lookback period of 72 hours.  Elevated hemoglobin A1c  Reviewed 12/15/24  Follows with medical team  No associated orders from this encounter found during lookback period of 72 hours.  Class 2 obesity in adult  Reviewed 12/15/24  Follows with medical team and given dietary counseling and need for exercise  No associated orders from this encounter found during lookback period of 72 hours.    Primary hypertension  Reviewed 12/15/24  Follows with medical team on hydrochlorothiazide and Norvasc  No associated orders from this encounter found during lookback period of 72 hours.    Recommended Treatment:     Planned medication and treatment changes:    All current active medications have been reviewed  Encourage group therapy, milieu therapy and occupational therapy  Behavioral Health checks for safety monitoring  Continue close observation for safety monitoring  Continue current medications:    Current medications:  Current Facility-Administered Medications   Medication Dose Route Frequency Provider Last Rate    acetaminophen  650 mg Oral Q4H PRN Jordan C Holter,       acetaminophen  650 mg Oral Q6H PRN HOLLI Lion      aluminum-magnesium hydroxide-simethicone  30 mL Oral Q4H PRN Jordan C Holter,       amLODIPine  5 mg Oral Daily HOLLI Lion      ARIPiprazole  30 mg Oral Daily Bora Rosario MD      Artificial Tears  1  drop Both Eyes Q3H PRN Jordan C Holter, DO      atropine  1 drop Sublingual HS Harjeet Torres, DO      haloperidol lactate  2.5 mg Intramuscular Q4H PRN Max 4/day HOLLI Lion      And    LORazepam  1 mg Intramuscular Q4H PRN Max 4/day STEVE LionNP      And    benztropine  0.5 mg Intramuscular Q4H PRN Max 4/day STEVE LionNP      benztropine  1 mg Intramuscular Q4H PRN Max 6/day Jordan C Holter, DO      haloperidol lactate  5 mg Intramuscular Q4H PRN Max 4/day STEVE LionNP      And    LORazepam  2 mg Intramuscular Q4H PRN Max 4/day HOLLI Lion      And    benztropine  1 mg Intramuscular Q4H PRN Max 4/day HOLLI Lion      benztropine  1 mg Oral Q4H PRN Max 6/day HOLLI Lion      benztropine  1 mg Oral Q4H PRN Max 6/day Jordan C Holter, DO      bisacodyl  10 mg Rectal Daily PRN HOLLI Lion      calcium carbonate  500 mg Oral BID PRN HOLLI Monge      cloZAPine  250 mg Oral HS Bora Rosario MD      hydrOXYzine HCL  50 mg Oral Q6H PRN Max 4/day Jordan C Holter, DO      Or    diphenhydrAMINE  50 mg Intramuscular Q6H PRN Jordan C Holter, DO      hydrOXYzine HCL  50 mg Oral Q6H PRN Max 4/day HOLLI Lion      Or    diphenhydrAMINE  50 mg Intramuscular Q6H PRN HOLLI Lion      diphenhydrAMINE-zinc acetate   Topical BID PRN HOLLI Lion      docusate sodium  100 mg Oral BID Jourdan Dickens, DO      escitalopram  20 mg Oral Daily HOLLI Lion      famotidine  20 mg Oral BID PRN HOLLI Monge      fluticasone  1 spray Each Nare Daily PRN HOLLI Monge      glycopyrrolate  1 mg Oral BID (AM & Afternoon) Bora Rosario MD      glycopyrrolate  2 mg Oral HS Bora Rosario MD      haloperidol  1 mg Oral Q6H PRN HOLLI Lion      haloperidol  2.5 mg Oral Q4H PRN Max 4/day HOLLI Lion      haloperidol  5 mg Oral Q4H PRN Max 4/day HOLLI Lion      hydroCHLOROthiazide  12.5 mg Oral Daily  Pia Mantilla, HOLLI      hydrocortisone   Topical 4x Daily PRN HOLLI Lion      hydrOXYzine HCL  100 mg Oral Q6H PRN Max 4/day [facility] Holter, DO      Or    LORazepam  2 mg Intramuscular Q6H PRN [facility] Holter, DO      hydrOXYzine HCL  100 mg Oral Q6H PRN Max 4/day HOLLI Lion      Or    LORazepam  2 mg Intramuscular Q6H PRN HOLLI Lion      hydrOXYzine HCL  25 mg Oral Q6H PRN Max 4/day [facility] Holter, DO      ibuprofen  600 mg Oral Q8H PRN HOLLI Lion      influenza vaccine  0.5 mL Intramuscular Once Bora Rosario MD      loratadine  10 mg Oral Daily Brina Guillen MD      magnesium hydroxide  30 mL Oral Once Jourdan Dickens, DO      melatonin  3 mg Oral HS PRN Bora Rosario MD      melatonin  9 mg Oral HS Bora Rosario MD      methocarbamol  500 mg Oral Q6H PRN HOLLI Lion      multivitamin-minerals  1 tablet Oral Daily HOLLI Monge      nicotine polacrilex  2 mg Oral Q4H PRN Bora Rsoario MD      OLANZapine  5 mg Oral Q4H PRN Max 3/day [facility] Holter, DO      Or    OLANZapine  2.5 mg Intramuscular Q4H PRN Max 3/day [facility] Holter, DO      OLANZapine  5 mg Oral Q3H PRN Max 3/day [facility] Holter, DO      Or    OLANZapine  5 mg Intramuscular Q3H PRN Max 3/day [facility] Holter, DO      OLANZapine  2.5 mg Oral Q4H PRN Max 6/day [facility] Holter, DO      ondansetron  4 mg Oral Q6H PRN HOLLI Lion      pantoprazole  20 mg Oral [facility] Holter, DO      polyethylene glycol  17 g Oral Daily PRN HOLLI Ghotra      polyethylene glycol  17 g Oral Daily Jourdan Dickens, DO      propranolol  10 mg Oral Q12H KAYLIE HOLLI Lion      senna-docusate sodium  1 tablet Oral Daily PRN [facility] Cresencioter, DO      senna-docusate sodium  2 tablet Oral HS Jourdan Dickens, DO      traZODone  50 mg Oral HS PRN HOLLI Lion      white petrolatum-mineral oil   Topical TID PRN HOLLI Lion         Risks / Benefits of Treatment:    Risks, benefits, and  "possible side effects of medications explained to patient and patient verbalizes understanding and agreement for treatment.    Subjective:    Patient was seen today for continuation of care, records reviewed and patient was discussed with the morning case review team. Per nursing report, patient continues to report AH. No behavioral concerns. Patient has been medication/meal compliant. He has been compliant with drinking more fluids due to his elevated CK. No acute events in the past 24 hours.     Alberto was seen today for psychiatric follow-up. On assessment today, Alberto was approached while laying in bed. Patient woke up to engage in conversation with this writer. He states that his mood today is \"okay\". Reports that sleep was okay overall but he woke up a few times throughout the night. Energy levels have been lower. Appetite has been good. Patient continues to report some vague AH but does not wish to discuss this with this writer. He states that he has no concerns to discuss with this writer.     Alberto denies acute suicidal/self-harm ideation/intent/plan upon direct inquiry at this time. Alberto remains behaviorally appropriate, no agitation or aggression noted on exam or in report. Alberto also denies HI/VH, and does not appear overtly manic. No overt delusions or paranoia are verbalized. Alberto remains adherent to his current psychotropic medication regimen and denies any side effects from medications, as well as none noted on exam.    Behavior over the last 24 hours: unchanged.     Sleep:  Appropriate  Appetite: Appropriate  Medication side effects: Denies  ROS: no complaints    Mental Status Evaluation:    Appearance:  age appropriate, adequate grooming, overweight   Behavior:  cooperative, calm   Speech:  normal rate and volume   Mood:  depressed   Affect:  constricted, mood-congruent   Thought Process:  coherent, goal directed   Associations: circumstantial associations   Thought Content:  some " paranoia, negative thinking, intrusive thoughts   Perceptual Disturbances: vague auditory hallucinations, denies visual hallucinations when asked   Risk Potential: Suicidal ideation - None at present  Homicidal ideation - None at present  Potential for aggression - Not at present   Sensorium:  oriented to person, place, time/date, and situation   Memory:  recent and remote memory grossly intact   Consciousness:  alert and awake   Attention/Concentration: attention span and concentration are age appropriate   Insight:  age appropriate   Judgment: age appropriate   Gait/Station: in bed   Motor Activity: no abnormal movements     Vital signs in last 24 hours:    Temp:  [97.8 °F (36.6 °C)] 97.8 °F (36.6 °C)  HR:  [] 85  BP: (114-122)/(59-68) 114/68  Resp:  [18] 18  SpO2:  [98 %] 98 %  O2 Device: None (Room air)    Laboratory results: I have personally reviewed all pertinent laboratory/tests results    Results from the past 24 hours: No results found for this or any previous visit (from the past 24 hours).    Suicide/Homicide Risk Assessment:    Risk of Harm to Self:   Nursing Suicide Risk Assessment Last 24 hours: C-SSRS Risk (Since Last Contact)  Calculated C-SSRS Risk Score (Since Last Contact): No Risk Indicated    Risk of Harm to Others:  Nursing Homicide Risk Assessment: Violence Risk to Others: Denies within past 6 months    The following interventions are recommended: Behavioral Health checks for safety monitoring    Progress Toward Goals: progressing    Counseling / Coordination of Care:    Total floor / unit time spent today 15 minutes. Greater than 50% of total time was spent with the patient and / or family counseling and / or coordination of care. A description of counseling / coordination of care:  Patient's progress reviewed with nursing staff.  Medication education provided to patient.    This note was not shared with the patient due to reasonable likelihood of causing patient harm    Eveline  Luba Anglin PA-C 12/15/24

## 2024-12-14 NOTE — ASSESSMENT & PLAN NOTE
Reviewed 12/14/24  Follows with medical team on hydrochlorothiazide and Norvasc  No associated orders from this encounter found during lookback period of 72 hours.

## 2024-12-14 NOTE — ASSESSMENT & PLAN NOTE
Reviewed 12/14/24  Follows with medical  Continue famotidine 20 mg tablet BID per medical team   Continue glycopyrrolate 1 mg tablet BID AM and afternoon per medical team  Continue glycopyrrolate 2 mg tablet before bed per medical team  Continue pantoprazole EC 20 mg tablet every morning per medical team  No associated orders from this encounter found during lookback period of 72 hours.

## 2024-12-14 NOTE — ASSESSMENT & PLAN NOTE
Reviewed 12/15/24  Follows with medical team on hydrochlorothiazide and Norvasc  No associated orders from this encounter found during lookback period of 72 hours.

## 2024-12-14 NOTE — ASSESSMENT & PLAN NOTE
Reviewed 12/14/24, on nicotine gum as as needed and encouraged smoking cessation  Continue to encourage cessation upon discharge  No associated orders from this encounter found during lookback period of 72 hours.

## 2024-12-14 NOTE — ASSESSMENT & PLAN NOTE
Reviewed on 12/14/2024.  Continues to report hearing same voices threatening to kill him and sending him to hell when he gets out but not at all commanding.  CK was high yesterday and agrees to have a repeat level. Tolerating the increase in Clozapine 225 mg to 250 mg daily at bedtime for psychosis as of 12/11/2024-- to consider further careful titration for Sx's depending on response  Continue CBC with differential every 2 weeks with CK and another 2 weeks to check for any further increase in CK due to prior history of increase in CK from clozapine and CK was 960 yesterday and medical encouraged him to drink fluids and he has agreed to force fluids    Continue Abilify 30 mg once daily for psychosis as he  is resistant to single antipsychotic  Continue Escitalopram 20 mg once daily for depression and anxiety  Continue Senna-Docusate sodium one 50 mg tablet daily before bed for constipation  Continue Robinul for drooling from Clozapine  Continue Propanolol 10 mg twice daily for anxiety     Pt remains on dual antipsychotic Tx due to failure on monotherapy      : ACT team referral was made for him living back with his aunt once MA funding comes through from the [facility]  No associated orders from this encounter found during lookback period of 72 hours.

## 2024-12-15 VITALS
HEIGHT: 66 IN | RESPIRATION RATE: 18 BRPM | OXYGEN SATURATION: 98 % | TEMPERATURE: 97.9 F | HEART RATE: 95 BPM | WEIGHT: 243.2 LBS | BODY MASS INDEX: 39.08 KG/M2 | SYSTOLIC BLOOD PRESSURE: 126 MMHG | DIASTOLIC BLOOD PRESSURE: 74 MMHG

## 2024-12-15 PROCEDURE — 99232 SBSQ HOSP IP/OBS MODERATE 35: CPT

## 2024-12-15 RX ADMIN — DOCUSATE SODIUM 100 MG: 100 CAPSULE, LIQUID FILLED ORAL at 17:10

## 2024-12-15 RX ADMIN — NICOTINE POLACRILEX 2 MG: 2 GUM, CHEWING ORAL at 17:10

## 2024-12-15 RX ADMIN — HYDROCHLOROTHIAZIDE 12.5 MG: 12.5 TABLET ORAL at 08:36

## 2024-12-15 RX ADMIN — GLYCOPYRROLATE 1 MG: 1 TABLET ORAL at 14:18

## 2024-12-15 RX ADMIN — DOCUSATE SODIUM 100 MG: 100 CAPSULE, LIQUID FILLED ORAL at 08:36

## 2024-12-15 RX ADMIN — ESCITALOPRAM OXALATE 20 MG: 10 TABLET, FILM COATED ORAL at 08:35

## 2024-12-15 RX ADMIN — MELATONIN TAB 3 MG 9 MG: 3 TAB at 21:11

## 2024-12-15 RX ADMIN — PROPRANOLOL HYDROCHLORIDE 10 MG: 10 TABLET ORAL at 08:35

## 2024-12-15 RX ADMIN — CLOZAPINE 250 MG: 25 TABLET ORAL at 21:11

## 2024-12-15 RX ADMIN — SENNOSIDES AND DOCUSATE SODIUM 2 TABLET: 50; 8.6 TABLET ORAL at 21:11

## 2024-12-15 RX ADMIN — NICOTINE POLACRILEX 2 MG: 2 GUM, CHEWING ORAL at 08:35

## 2024-12-15 RX ADMIN — PANTOPRAZOLE SODIUM 20 MG: 20 TABLET, DELAYED RELEASE ORAL at 08:36

## 2024-12-15 RX ADMIN — ARIPIPRAZOLE 30 MG: 15 TABLET ORAL at 08:35

## 2024-12-15 RX ADMIN — AMLODIPINE BESYLATE 5 MG: 5 TABLET ORAL at 08:36

## 2024-12-15 RX ADMIN — PROPRANOLOL HYDROCHLORIDE 10 MG: 10 TABLET ORAL at 21:12

## 2024-12-15 RX ADMIN — MULTIPLE VITAMINS W/ MINERALS TAB 1 TABLET: TAB ORAL at 08:36

## 2024-12-15 RX ADMIN — GLYCOPYRROLATE 1 MG: 1 TABLET ORAL at 08:36

## 2024-12-15 RX ADMIN — NICOTINE POLACRILEX 2 MG: 2 GUM, CHEWING ORAL at 21:11

## 2024-12-15 RX ADMIN — LORATADINE 10 MG: 10 TABLET ORAL at 08:36

## 2024-12-15 RX ADMIN — GLYCOPYRROLATE 2 MG: 1 TABLET ORAL at 21:12

## 2024-12-15 NOTE — PLAN OF CARE
Problem: Alteration in Thoughts and Perception  Goal: Agree to be compliant with medication regime, as prescribed and report medication side effects  Description: Interventions:  - Offer appropriate PRN medication and supervise ingestion; conduct AIMS, as needed   Outcome: Progressing  Goal: Attend and participate in unit activities, including therapeutic, recreational, and educational groups  Description: Interventions:  -Encourage Visitation and family involvement in care  Outcome: Progressing  Goal: Recognize dysfunctional thoughts, communicate reality-based thoughts at the time of discharge  Description: Interventions:  - Provide medication and psycho-education to assist patient in compliance and developing insight into his/her illness   Outcome: Progressing  Goal: Complete daily ADLs, including personal hygiene independently, as able  Description: Interventions:  - Observe, teach, and assist patient with ADLS  - Monitor and promote a balance of rest/activity, with adequate nutrition and elimination   Outcome: Progressing     Problem: Ineffective Coping  Goal: Participates in unit activities  Description: Interventions:  - Provide therapeutic environment   - Provide required programming   - Redirect inappropriate behaviors   Outcome: Progressing  Goal: Patient/Family participate in treatment and DC plans  Description: Interventions:  - Provide therapeutic environment  Outcome: Progressing  Goal: Patient/Family verbalizes awareness of resources  Outcome: Progressing     Problem: Depression  Goal: Verbalize thoughts and feelings  Description: Interventions:  - Assess and re-assess patient's level of risk   - Engage patient in 1:1 interactions, daily, for a minimum of 15 minutes   - Encourage patient to express feelings, fears, frustrations, hopes   Outcome: Progressing  Goal: Refrain from harming self  Description: Interventions:  - Monitor patient closely, per order   - Supervise medication ingestion, monitor  effects and side effects   Outcome: Progressing  Goal: Refrain from isolation  Description: Interventions:  - Develop a trusting relationship   - Encourage socialization   Outcome: Progressing  Goal: Refrain from self-neglect  Outcome: Progressing     Problem: Alteration in Orientation  Goal: Interact with staff daily  Description: Interventions:  - Assess and re-assess patient's level of orientation  - Engage patient in 1 on 1 interactions, daily, for a minimum of 15 minutes   - Establish rapport/trust with patient   Outcome: Progressing  Goal: Express concerns related to confused thinking related to:  Description: Interventions:  - Encourage patient to express feelings, fears, frustrations, hopes  - Assign consistent caregivers   - [place]/re-orient patient as needed  - Allow comfort items, as appropriate  - Provide visual cues, signs, etc.   Outcome: Progressing  Goal: Allow medical examinations, as recommended  Description: Interventions:  - Provide physical/neurological exams and/or referrals, per provider   Outcome: Progressing  Goal: Cooperate with recommended testing/procedures  Description: Interventions:  - Determine need for ancillary testing  - Observe for mental status changes  - Implement falls/precaution protocol   Outcome: Progressing  Goal: Complete daily ADLs, including personal hygiene independently, as able  Description: Interventions:  - Observe, teach, and assist patient with ADLS  Outcome: Progressing     Problem: DISCHARGE PLANNING - CARE MANAGEMENT  Goal: Discharge to post-acute care or home with appropriate resources  Description: INTERVENTIONS:  - Conduct assessment to determine patient/family and health care team treatment goals, and need for post-acute services based on payer coverage, community resources, and patient preferences, and barriers to discharge  - Address psychosocial, clinical, and financial barriers to discharge as identified in assessment in conjunction with the  patient/family and health care team  - Arrange appropriate level of post-acute services according to patient's   needs and preference and payer coverage in collaboration with the physician and health care team  - Communicate with and update the patient/family, physician, and health care team regarding progress on the discharge plan  - Arrange appropriate transportation to post-acute venues  Outcome: Progressing     Problem: Alteration in Thoughts and Perception  Goal: Treatment Goal: Gain control of psychotic behaviors/thinking, reduce/eliminate presenting symptoms and demonstrate improved reality functioning upon discharge  Outcome: Not Progressing  Goal: Refrain from acting on delusional thinking/internal stimuli  Description: Interventions:  - Monitor patient closely, per order   - Utilize least restrictive measures   - Set reasonable limits, give positive feedback for acceptable   - Administer medications as ordered and monitor of potential side effects  Outcome: Not Progressing     Problem: Depression  Goal: Treatment Goal: Demonstrate behavioral control of depressive symptoms, verbalize feelings of improved mood/affect, and adopt new coping skills prior to discharge  Outcome: Not Progressing

## 2024-12-15 NOTE — NURSING NOTE
Patient requested Nicorette gum at this time. Patient visible all evening with peers in the dining room watching television. Patient cooperative with staff and medication compliant. Behaviors controlled and appropriate all evening. Offered no complaints. Uneventful shift noted.

## 2024-12-15 NOTE — NURSING NOTE
Patient was pleasant and cooperative. He slept through breakfast. He ate 100 of lunch and dinner. He is interactive with staff. He compliant with medications. He denied anxiety and depression. Patient cooperative with weekly assessment.

## 2024-12-16 PROBLEM — Z00.8 MEDICAL CLEARANCE FOR PSYCHIATRIC ADMISSION: Status: RESOLVED | Noted: 2023-06-29 | Resolved: 2024-12-16

## 2024-12-16 PROCEDURE — 99232 SBSQ HOSP IP/OBS MODERATE 35: CPT | Performed by: PSYCHIATRY & NEUROLOGY

## 2024-12-16 RX ADMIN — LORATADINE 10 MG: 10 TABLET ORAL at 08:30

## 2024-12-16 RX ADMIN — ESCITALOPRAM OXALATE 20 MG: 10 TABLET, FILM COATED ORAL at 08:31

## 2024-12-16 RX ADMIN — SENNOSIDES AND DOCUSATE SODIUM 2 TABLET: 50; 8.6 TABLET ORAL at 21:17

## 2024-12-16 RX ADMIN — ARIPIPRAZOLE 30 MG: 15 TABLET ORAL at 08:30

## 2024-12-16 RX ADMIN — PANTOPRAZOLE SODIUM 20 MG: 20 TABLET, DELAYED RELEASE ORAL at 08:31

## 2024-12-16 RX ADMIN — NICOTINE POLACRILEX 2 MG: 2 GUM, CHEWING ORAL at 17:32

## 2024-12-16 RX ADMIN — MELATONIN TAB 3 MG 9 MG: 3 TAB at 21:17

## 2024-12-16 RX ADMIN — DOCUSATE SODIUM 100 MG: 100 CAPSULE, LIQUID FILLED ORAL at 08:31

## 2024-12-16 RX ADMIN — GLYCOPYRROLATE 1 MG: 1 TABLET ORAL at 08:30

## 2024-12-16 RX ADMIN — NICOTINE POLACRILEX 2 MG: 2 GUM, CHEWING ORAL at 21:17

## 2024-12-16 RX ADMIN — PROPRANOLOL HYDROCHLORIDE 10 MG: 10 TABLET ORAL at 08:30

## 2024-12-16 RX ADMIN — AMLODIPINE BESYLATE 5 MG: 5 TABLET ORAL at 08:31

## 2024-12-16 RX ADMIN — HYDROCHLOROTHIAZIDE 12.5 MG: 12.5 TABLET ORAL at 08:31

## 2024-12-16 RX ADMIN — NICOTINE POLACRILEX 2 MG: 2 GUM, CHEWING ORAL at 12:45

## 2024-12-16 RX ADMIN — NICOTINE POLACRILEX 2 MG: 2 GUM, CHEWING ORAL at 08:31

## 2024-12-16 RX ADMIN — MULTIPLE VITAMINS W/ MINERALS TAB 1 TABLET: TAB ORAL at 08:31

## 2024-12-16 RX ADMIN — CLOZAPINE 250 MG: 25 TABLET ORAL at 21:17

## 2024-12-16 RX ADMIN — DOCUSATE SODIUM 100 MG: 100 CAPSULE, LIQUID FILLED ORAL at 17:12

## 2024-12-16 RX ADMIN — ATROPINE SULFATE 1 DROP: 10 SOLUTION/ DROPS OPHTHALMIC at 21:17

## 2024-12-16 RX ADMIN — PROPRANOLOL HYDROCHLORIDE 10 MG: 10 TABLET ORAL at 21:17

## 2024-12-16 RX ADMIN — GLYCOPYRROLATE 1 MG: 1 TABLET ORAL at 14:32

## 2024-12-16 RX ADMIN — GLYCOPYRROLATE 2 MG: 1 TABLET ORAL at 21:17

## 2024-12-16 NOTE — PROGRESS NOTES
12/16/24 0826   Team Meeting   Meeting Type Daily Rounds   Team Members Present   Team Members Present Physician;Nurse;;Other (Discipline and Name)   Physician Team Member Thomas, Holter   Nursing Team Member Claudia   Social Work Team Member Jsoe J ORTEGA   Patient/Family Present   Patient Present No   Patient's Family Present No     Groups Participation  9/10.   Patient's compliant with medications. He's engaged in his treatment. He's appropriate and socializes with his peers.

## 2024-12-16 NOTE — ASSESSMENT & PLAN NOTE
Reviewed on 12/16/2024.  Patient continues to report hearing vague AH.  CK level has been high and patient is encouraged to drink more fluids.  No behavioral concerns.    Continue medication as follows:  Clozapine 250 mg daily at bedtime for psychosis - increased 12/11/24  To consider further careful titration  CBC w/diff and CK every 2 weeks  CK was increased to 960 on 12/12 and oral hydration encouraged, CK improved to 905 on 12/13/24  Repeat CK tomorrow  Continue Abilify 30 mg once daily for psychosis as he is resistant to single antipsychotic  Continue Escitalopram 20 mg once daily for depression and anxiety  Continue Robinul 1 mg BID and 2 mg QHS for sialorrhea from Clozapine  Continue Atropine 1% solution 1 drop sublingual QHS for adverse effects of Clozapine  Continue Propanolol 10 mg BID for anxiety   Continue Melatonin 9 mg QHS for sleep  Continue Senna-Docusate sodium one 50 mg tablet daily before bed for constipation    Pt remains on dual antipsychotic Tx due to failure on monotherapy   .  S/p ECT treatments.  - ACT team referral was made for him living back with his aunt once MA funding comes through from the [facility]

## 2024-12-16 NOTE — PROGRESS NOTES
Progress Note - Behavioral Health   Name: Alberto Berumen 29 y.o. male I MRN: [id number]  Unit/Bed#: [facility] 112-02 I Date of Admission: 3/29/2024   Date of Service: 12/16/2024 I Hospital Day: 262     Assessment & Plan  Schizoaffective disorder, bipolar type (HCC)  Reviewed on 12/16/2024.  Patient continues to report hearing vague AH.  CK level has been high and patient is encouraged to drink more fluids.  No behavioral concerns.    Continue medication as follows:  Clozapine 250 mg daily at bedtime for psychosis - increased 12/11/24  To consider further careful titration  CBC w/diff and CK every 2 weeks  CK was increased to 960 on 12/12 and oral hydration encouraged, CK improved to 905 on 12/13/24  Repeat CK tomorrow  Continue Abilify 30 mg once daily for psychosis as he is resistant to single antipsychotic  Continue Escitalopram 20 mg once daily for depression and anxiety  Continue Robinul 1 mg BID and 2 mg QHS for sialorrhea from Clozapine  Continue Atropine 1% solution 1 drop sublingual QHS for adverse effects of Clozapine  Continue Propanolol 10 mg BID for anxiety   Continue Melatonin 9 mg QHS for sleep  Continue Senna-Docusate sodium one 50 mg tablet daily before bed for constipation    Pt remains on dual antipsychotic Tx due to failure on monotherapy   .  S/p ECT treatments.  - ACT team referral was made for him living back with his aunt once MA funding comes through from the [facility]  Chronic idiopathic constipation  Reviewed 12/16/24  Follows with medical team  Continue senna-docusate sodium dose per medical team.   GERD (gastroesophageal reflux disease)  Reviewed 12/16/24  Follows with medical  Continue famotidine 20 mg tablet BID per medical team   Continue glycopyrrolate 1 mg tablet BID AM and afternoon per medical team  Continue glycopyrrolate 2 mg tablet before bed per medical team  Continue pantoprazole EC 20 mg tablet every morning per medical team  This is a chronic condition, and is stable  unless otherwise noted.  Medical clearance for psychiatric admission (Resolved: 12/16/2024)  Reviewed 12/16/24  Ongoing evaluation by medical team      Tobacco abuse  Reviewed 12/16/24, on nicotine gum as as needed and encouraged smoking cessation  Continue to encourage cessation upon discharge    Elevated hemoglobin A1c  Reviewed 12/16/24  Follows with medical team    Class 2 obesity in adult  Reviewed 12/16/24  Follows with medical team and given dietary counseling and need for exercise    Primary hypertension  Reviewed 12/16/24  Follows with medical team on hydrochlorothiazide and Norvasc      Psychiatry Progress Note [facility]    Progress towards goals: progressing slowly    Review of systems: denied    Psychiatric Diagnosis: Schizoaffective disorder, bipolar type  Tobacco use disorder    Assessment  Overall Status:  progressing, no significant changes  Certification Statement: The patient will continue to require additional inpatient hospital stay due to psychotic related symptoms and limited response to medications and ECT.      Medications: as above  Side effects from treatment: denied  Medication changes: as above  Medication education: Risks side effects benefits and precautions of medications discussed with patient and they did verbalize an understanding about risks for metabolic syndrome from being on neuroleptics and tardive dyskinesia etc.  All medications reviewed and I recommend that they be continued for symptom management  Understanding of medications: Risks side effects benefits and precautions of medications discussed with patient and they did verbalize an understanding about risks for metabolic syndrome from being on neuroleptics and tardive dyskinesia etc., and patient appeared to have understanding.  Justification for dual anti-psychotics: due to not responding to single antipsychotics.    Non-pharmacological treatments  Continue with individual, group, milieu and  occupational therapy using recovery principles and psycho-education about accepting illness and the need for treatment.  Behavioral health checks every 7 minutes    Safety  Safety and communication plan established to target dynamic risk factors discussed above.    Discharge Plan   To his Aunt when [facility] funding for ACT received.    Interval Progress: Per treatment team, patient has been compliant with medications and treatment. He is cooperative with care and appropriate with staff and peers. He is observed socializing with select peers.  The patient did not require psychotropic PRN medications yesterday or over the weekend.    Today, the patient is cooperative with evaluation. He discusses good sleep and appetite, he endorses overall okay mood today. He denies visual hallucinations, and endorses auditory hallucinations of voices telling him to kill himself. The patient states they occur the majority of the day - thought he notices them quiet when he is working out and he was encouraged to do the same. He notes this is a goal for him today.  The patient denies SI, HI, passive death wishes, or thoughts to harm self or others at time of evaluation.  The patient denies adverse effects of medication, with exception of sialorrhea which is being managed by medication.    Acceptance by patient: accepting of inpatient treatment  Hopefulness in recovery: to live with Aunt  Involved in reintegration process: speaks with siblings and Aunt in community  Trusting in relationship with psychiatrist: trusting  Sleep: good  Appetite: good  Compliance with Medications: compliant  Group attendance: 9/10 groups attended  Significant events: none in the past 24 hours    Mental Status Exam  Appearance: age appropriate, casually dressed, adequate grooming, wearing a restrepo  Behavior: cooperative, calm, good eye contact  Speech: normal rate, normal volume, normal pitch, clear, coherent, not pressured, not hyperverbal  Mood:   "\"okay\"  Affect: constricted, mood-congruent  Thought Process:  organized, generally logical and linear, coherent, appears to have thought slowing  Thought Content: some paranoia, negative thoughts, intrusive thoughts  Perceptual Disturbances: auditory hallucinations of voices telling him to kill himself - unchanged compared to prior, denies visual hallucinations when asked, does not appear responding to internal stimuli  Risk Potential: Suicidal ideations: denies    Homicidal ideations: denies    Potential for Aggression: not at present  Sensorium: alert and oriented to person, place, time and situation  Cognition: recent and remote memory grossly intact  Consciousness: alert and awake  Attention: attention span and concentration are age appropriate  Intellect: appears to be of average intelligence  Insight: intact  Judgement: intact  Motor Activity: no abnormal movements     Vitals  Temp:  [97.3 °F (36.3 °C)-97.9 °F (36.6 °C)] 97.3 °F (36.3 °C)  HR:  [84-95] 84  Resp:  [17-18] 18  BP: (120-130)/(58-75) 130/75  SpO2:  [95 %-98 %] 98 %  No intake or output data in the 24 hours ending 12/16/24 0809    Lab Results: All Labs Within Last 24 Hours Reviewed  Results from last 7 days   Lab Units 12/10/24  0914   WBC Thousand/uL 8.28   RBC Million/uL 5.20   HEMOGLOBIN g/dL 12.6   HEMATOCRIT % 40.6   MCV fL 78*   PLATELETS Thousands/uL 234   TOTAL NEUT ABS Thousands/µL 4.38       Current Facility-Administered Medications   Medication Dose Route Frequency Provider Last Rate    acetaminophen  650 mg Oral Q4H PRN Jordan C Holter, DO      acetaminophen  650 mg Oral Q6H PRN HOLLI Lion      aluminum-magnesium hydroxide-simethicone  30 mL Oral Q4H PRN Jordan C Holter, DO      amLODIPine  5 mg Oral Daily HOLLI Lion      ARIPiprazole  30 mg Oral Daily Bora Rosario MD      Artificial Tears  1 drop Both Eyes Q3H PRN Jordan C Holter, DO      atropine  1 drop Sublingual GAEL Torres, DO      haloperidol lactate  2.5 " mg Intramuscular Q4H PRN Max 4/day STEVE LionNP      And    LORazepam  1 mg Intramuscular Q4H PRN Max 4/day HOLLI Lion      And    benztropine  0.5 mg Intramuscular Q4H PRN Max 4/day HOLLI Lion      benztropine  1 mg Intramuscular Q4H PRN Max 6/day Ion FISCHER Holter, DO      haloperidol lactate  5 mg Intramuscular Q4H PRN Max 4/day HOLLI Lion      And    LORazepam  2 mg Intramuscular Q4H PRN Max 4/day STEVE LionNP      And    benztropine  1 mg Intramuscular Q4H PRN Max 4/day HOLLI Lion      benztropine  1 mg Oral Q4H PRN Max 6/day HOLLI Lion      benztropine  1 mg Oral Q4H PRN Max 6/day Ion FISCHER Holter, DO      bisacodyl  10 mg Rectal Daily PRN HOLLI Lion      calcium carbonate  500 mg Oral BID PRN HOLLI Monge      cloZAPine  250 mg Oral HS Bora Rosario MD      hydrOXYzine HCL  50 mg Oral Q6H PRN Max 4/day Jordan C Holter, DO      Or    diphenhydrAMINE  50 mg Intramuscular Q6H PRN Jordan C Holter, DO      hydrOXYzine HCL  50 mg Oral Q6H PRN Max 4/day HOLLI Lion      Or    diphenhydrAMINE  50 mg Intramuscular Q6H PRN HOLLI Lion      diphenhydrAMINE-zinc acetate   Topical BID PRN HOLLI Lion      docusate sodium  100 mg Oral BID Jourdan Dickens, DO      escitalopram  20 mg Oral Daily HOLLI Lion      famotidine  20 mg Oral BID PRN HOLLI Monge      fluticasone  1 spray Each Nare Daily PRN HOLLI Monge      glycopyrrolate  1 mg Oral BID (AM & Afternoon) Bora Rosario MD      glycopyrrolate  2 mg Oral HS Bora Rosario MD      haloperidol  1 mg Oral Q6H PRN HOLLI Lion      haloperidol  2.5 mg Oral Q4H PRN Max 4/day HOLLI Lion      haloperidol  5 mg Oral Q4H PRN Max 4/day HOLLI Lion      hydroCHLOROthiazide  12.5 mg Oral Daily HOLLI Galvan      hydrocortisone   Topical 4x Daily PRN HOLLI Lion      hydrOXYzine HCL  100 mg Oral Q6H PRN Max  4/day [facility] Cresencioter, DO      Or    LORazepam  2 mg Intramuscular Q6H PRN [facility] Cresencioter, DO      hydrOXYzine HCL  100 mg Oral Q6H PRN Max 4/day HOLLI Lion      Or    LORazepam  2 mg Intramuscular Q6H PRN HOLLI Lion      hydrOXYzine HCL  25 mg Oral Q6H PRN Max 4/day [facility] Cresencioter, DO      ibuprofen  600 mg Oral Q8H PRN HOLLI Lion      influenza vaccine  0.5 mL Intramuscular Once Bora Rosario MD      loratadine  10 mg Oral Daily Brina Guillen MD      magnesium hydroxide  30 mL Oral Once Jourdan Dickens, DO      melatonin  3 mg Oral HS PRN Bora Rosario MD      melatonin  9 mg Oral HS Broa Rosario MD      methocarbamol  500 mg Oral Q6H PRN HOLLI Lion      multivitamin-minerals  1 tablet Oral Daily Eileen CherryHOLLI Jimenez      nicotine polacrilex  2 mg Oral Q4H PRN Bora Rosario MD      OLANZapine  5 mg Oral Q4H PRN Max 3/day [facility] Holter, DO      Or    OLANZapine  2.5 mg Intramuscular Q4H PRN Max 3/day [facility] Holter, DO      OLANZapine  5 mg Oral Q3H PRN Max 3/day [facility] Holter, DO      Or    OLANZapine  5 mg Intramuscular Q3H PRN Max 3/day [facility] Holter, DO      OLANZapine  2.5 mg Oral Q4H PRN Max 6/day [facility] Cresencioter, DO      ondansetron  4 mg Oral Q6H PRN HOLLI Lion      pantoprazole  20 [facility]      polyethylene glycol  17 g Oral Daily PRN HOLLI Ghotra      polyethylene glycol  17 g Oral Daily Jourdan Dickens, DO      propranolol  10 mg Oral Q12H KAYLIE HOLLI Lion      senna-docusate sodium  1 tablet Oral Daily PRN Ion  Holter, DO      senna-docusate sodium  2 tablet Oral HS Jourdan Dickens, DO      traZODone  50 mg Oral HS PRN HOLLI Lion      white petrolatum-mineral oil   Topical TID PRN HOLLI Lion         Counseling / Coordination of Care: Total floor / unit time spent today 15 minutes. Greater than 50% of total time was spent with the patient and / or family counseling and / or somewhat  receptive to supportive listening and teaching positive coping skills to deal with symptom mangement.     Patient's Rights, confidentiality and exceptions to confidentiality, use of automated medical record, Behavioral Health Services staff access to medical record, and consent to treatment reviewed.    This note has been dictated and hence there may be problems with punctuation, spelling and formatting and if anyone has any concerns please address them to Dr. Mora.  This note is not shared with patient due to potential for making patient's condition worse by knowing the content of the note.    Hollie Mora,   Psychiatry Resident, PGY-IV

## 2024-12-16 NOTE — NURSING NOTE
"Alberto was calm and cooperative during assessment. He denies current SI, SH, HI, and VH. He endorses stable AH of \"a voice saying it will harm me and my family.\" He states that he is able to distract himself by talking to peers or watching TV.\" He was pleasant and social with peers and staff in the milieu and participated in group programming. He attended meals in the milieu and walked with peers during free times. He was medication compliant and requested PRN nicorette gum. He denies new concerns at this time.   "

## 2024-12-16 NOTE — ASSESSMENT & PLAN NOTE
Reviewed 12/16/24, on nicotine gum as as needed and encouraged smoking cessation  Continue to encourage cessation upon discharge

## 2024-12-16 NOTE — PLAN OF CARE
Problem: Ineffective Coping  Goal: Identifies ineffective coping skills  Outcome: Progressing  Goal: Identifies healthy coping skills  Outcome: Progressing  Goal: Demonstrates healthy coping skills  Outcome: Progressing  Goal: Participates in unit activities  Description: Interventions:  - Provide therapeutic environment   - Provide required programming   - Redirect inappropriate behaviors   Outcome: Progressing    He continues to make progress towards the goals, he participates and engages in the groups.

## 2024-12-16 NOTE — ASSESSMENT & PLAN NOTE
Reviewed 12/16/24  Follows with medical  Continue famotidine 20 mg tablet BID per medical team   Continue glycopyrrolate 1 mg tablet BID AM and afternoon per medical team  Continue glycopyrrolate 2 mg tablet before bed per medical team  Continue pantoprazole EC 20 mg tablet every morning per medical team  This is a chronic condition, and is stable unless otherwise noted.

## 2024-12-16 NOTE — ASSESSMENT & PLAN NOTE
Reviewed 12/16/24  Follows with medical team  Continue senna-docusate sodium dose per medical team.

## 2024-12-16 NOTE — PLAN OF CARE
Problem: Alteration in Thoughts and Perception  Goal: Treatment Goal: Gain control of psychotic behaviors/thinking, reduce/eliminate presenting symptoms and demonstrate improved reality functioning upon discharge  Outcome: Progressing  Goal: Refrain from acting on delusional thinking/internal stimuli  Description: Interventions:  - Monitor patient closely, per order   - Utilize least restrictive measures   - Set reasonable limits, give positive feedback for acceptable   - Administer medications as ordered and monitor of potential side effects  Outcome: Progressing  Goal: Agree to be compliant with medication regime, as prescribed and report medication side effects  Description: Interventions:  - Offer appropriate PRN medication and supervise ingestion; conduct AIMS, as needed   Outcome: Progressing  Goal: Attend and participate in unit activities, including therapeutic, recreational, and educational groups  Description: Interventions:  -Encourage Visitation and family involvement in care  Outcome: Progressing  Goal: Recognize dysfunctional thoughts, communicate reality-based thoughts at the time of discharge  Description: Interventions:  - Provide medication and psycho-education to assist patient in compliance and developing insight into his/her illness   Outcome: Progressing  Goal: Complete daily ADLs, including personal hygiene independently, as able  Description: Interventions:  - Observe, teach, and assist patient with ADLS  - Monitor and promote a balance of rest/activity, with adequate nutrition and elimination   Outcome: Progressing     Problem: Ineffective Coping  Goal: Identifies ineffective coping skills  Outcome: Progressing  Goal: Identifies healthy coping skills  Outcome: Progressing  Goal: Demonstrates healthy coping skills  Outcome: Progressing  Goal: Participates in unit activities  Description: Interventions:  - Provide therapeutic environment   - Provide required programming   - Redirect  inappropriate behaviors   Outcome: Progressing  Goal: Patient/Family participate in treatment and DC plans  Description: Interventions:  - Provide therapeutic environment  Outcome: Progressing  Goal: Patient/Family verbalizes awareness of resources  Outcome: Progressing     Problem: Depression  Goal: Treatment Goal: Demonstrate behavioral control of depressive symptoms, verbalize feelings of improved mood/affect, and adopt new coping skills prior to discharge  Outcome: Progressing  Goal: Verbalize thoughts and feelings  Description: Interventions:  - Assess and re-assess patient's level of risk   - Engage patient in 1:1 interactions, daily, for a minimum of 15 minutes   - Encourage patient to express feelings, fears, frustrations, hopes   Outcome: Progressing  Goal: Refrain from harming self  Description: Interventions:  - Monitor patient closely, per order   - Supervise medication ingestion, monitor effects and side effects   Outcome: Progressing  Goal: Refrain from isolation  Description: Interventions:  - Develop a trusting relationship   - Encourage socialization   Outcome: Progressing  Goal: Refrain from self-neglect  Outcome: Progressing  Goal: Attend and participate in unit activities, including therapeutic, recreational, and educational groups  Description: Interventions:  - Provide therapeutic and educational activities daily, encourage attendance and participation, and document same in the medical record   Outcome: Progressing     Problem: Anxiety  Goal: Anxiety is at manageable level  Description: Interventions:  - Assess and monitor patient's anxiety level.   - Monitor for signs and symptoms (heart palpitations, chest pain, shortness of breath, headaches, nausea, feeling jumpy, restlessness, irritable, apprehensive).   - Collaborate with interdisciplinary team and initiate plan and interventions as ordered.  - [place] patient to unit/surroundings  - Explain treatment plan  - Encourage participation in  care  - Encourage verbalization of concerns/fears  - Identify coping mechanisms  - Assist in developing anxiety-reducing skills  - Administer/offer alternative therapies  - Limit or eliminate stimulants  Outcome: Progressing     Problem: Alteration in Orientation  Goal: Treatment Goal: Demonstrate a reduction of confusion and improved orientation to person, place, time and/or situation upon discharge, according to optimum baseline/ability  Outcome: Progressing  Goal: Interact with staff daily  Description: Interventions:  - Assess and re-assess patient's level of orientation  - Engage patient in 1 on 1 interactions, daily, for a minimum of 15 minutes   - Establish rapport/trust with patient   Outcome: Progressing  Goal: Express concerns related to confused thinking related to:  Description: Interventions:  - Encourage patient to express feelings, fears, frustrations, hopes  - Assign consistent caregivers   - Carmi/re-orient patient as needed  - Allow comfort items, as appropriate  - Provide visual cues, signs, etc.   Outcome: Progressing  Goal: Allow medical examinations, as recommended  Description: Interventions:  - Provide physical/neurological exams and/or referrals, per provider   Outcome: Progressing  Goal: Cooperate with recommended testing/procedures  Description: Interventions:  - Determine need for ancillary testing  - Observe for mental status changes  - Implement falls/precaution protocol   Outcome: Progressing  Goal: Complete daily ADLs, including personal hygiene independently, as able  Description: Interventions:  - Observe, teach, and assist patient with ADLS  Outcome: Progressing     Problem: DISCHARGE PLANNING - CARE MANAGEMENT  Goal: Discharge to post-acute care or home with appropriate resources  Description: INTERVENTIONS:  - Conduct assessment to determine patient/family and health care team treatment goals, and need for post-acute services based on payer coverage, community resources, and  patient preferences, and barriers to discharge  - Address psychosocial, clinical, and financial barriers to discharge as identified in assessment in conjunction with the patient/family and health care team  - Arrange appropriate level of post-acute services according to patient's   needs and preference and payer coverage in collaboration with the physician and health care team  - Communicate with and update the patient/family, physician, and health care team regarding progress on the discharge plan  - Arrange appropriate transportation to post-acute venues  Outcome: Progressing

## 2024-12-16 NOTE — NURSING NOTE
Alberto maintained on ongoing SAFE precaution without incident on this shift. Awake, alert,pleasant and cooperative.   Attended and participated in 3 out of 6 groups today. Continues to be compliant with medication regimen,. Continues to have the same auditory hallucination.  Behavior control..

## 2024-12-17 LAB — CK SERPL-CCNC: 674 U/L (ref 39–308)

## 2024-12-17 PROCEDURE — 99232 SBSQ HOSP IP/OBS MODERATE 35: CPT | Performed by: PSYCHIATRY & NEUROLOGY

## 2024-12-17 PROCEDURE — 82550 ASSAY OF CK (CPK): CPT

## 2024-12-17 RX ADMIN — ESCITALOPRAM OXALATE 20 MG: 10 TABLET, FILM COATED ORAL at 08:39

## 2024-12-17 RX ADMIN — SENNOSIDES AND DOCUSATE SODIUM 2 TABLET: 50; 8.6 TABLET ORAL at 21:10

## 2024-12-17 RX ADMIN — CLOZAPINE 250 MG: 25 TABLET ORAL at 21:10

## 2024-12-17 RX ADMIN — GLYCOPYRROLATE 2 MG: 1 TABLET ORAL at 21:10

## 2024-12-17 RX ADMIN — GLYCOPYRROLATE 1 MG: 1 TABLET ORAL at 15:25

## 2024-12-17 RX ADMIN — HYDROCHLOROTHIAZIDE 12.5 MG: 12.5 TABLET ORAL at 08:39

## 2024-12-17 RX ADMIN — LORATADINE 10 MG: 10 TABLET ORAL at 08:38

## 2024-12-17 RX ADMIN — PROPRANOLOL HYDROCHLORIDE 10 MG: 10 TABLET ORAL at 21:10

## 2024-12-17 RX ADMIN — NICOTINE POLACRILEX 2 MG: 2 GUM, CHEWING ORAL at 08:39

## 2024-12-17 RX ADMIN — GLYCOPYRROLATE 1 MG: 1 TABLET ORAL at 08:39

## 2024-12-17 RX ADMIN — MULTIPLE VITAMINS W/ MINERALS TAB 1 TABLET: TAB ORAL at 08:39

## 2024-12-17 RX ADMIN — PROPRANOLOL HYDROCHLORIDE 10 MG: 10 TABLET ORAL at 08:39

## 2024-12-17 RX ADMIN — AMLODIPINE BESYLATE 5 MG: 5 TABLET ORAL at 08:39

## 2024-12-17 RX ADMIN — DOCUSATE SODIUM 100 MG: 100 CAPSULE, LIQUID FILLED ORAL at 08:38

## 2024-12-17 RX ADMIN — DOCUSATE SODIUM 100 MG: 100 CAPSULE, LIQUID FILLED ORAL at 17:05

## 2024-12-17 RX ADMIN — NICOTINE POLACRILEX 2 MG: 2 GUM, CHEWING ORAL at 21:10

## 2024-12-17 RX ADMIN — NICOTINE POLACRILEX 2 MG: 2 GUM, CHEWING ORAL at 17:05

## 2024-12-17 RX ADMIN — MELATONIN TAB 3 MG 9 MG: 3 TAB at 21:10

## 2024-12-17 RX ADMIN — PANTOPRAZOLE SODIUM 20 MG: 20 TABLET, DELAYED RELEASE ORAL at 08:39

## 2024-12-17 RX ADMIN — ARIPIPRAZOLE 30 MG: 15 TABLET ORAL at 08:39

## 2024-12-17 NOTE — PLAN OF CARE
Problem: Alteration in Thoughts and Perception  Goal: Treatment Goal: Gain control of psychotic behaviors/thinking, reduce/eliminate presenting symptoms and demonstrate improved reality functioning upon discharge  Outcome: Progressing  Goal: Refrain from acting on delusional thinking/internal stimuli  Description: Interventions:  - Monitor patient closely, per order   - Utilize least restrictive measures   - Set reasonable limits, give positive feedback for acceptable   - Administer medications as ordered and monitor of potential side effects  Outcome: Progressing  Goal: Agree to be compliant with medication regime, as prescribed and report medication side effects  Description: Interventions:  - Offer appropriate PRN medication and supervise ingestion; conduct AIMS, as needed   Outcome: Progressing  Goal: Attend and participate in unit activities, including therapeutic, recreational, and educational groups  Description: Interventions:  -Encourage Visitation and family involvement in care  Outcome: Progressing  Goal: Recognize dysfunctional thoughts, communicate reality-based thoughts at the time of discharge  Description: Interventions:  - Provide medication and psycho-education to assist patient in compliance and developing insight into his/her illness   Outcome: Progressing  Goal: Complete daily ADLs, including personal hygiene independently, as able  Description: Interventions:  - Observe, teach, and assist patient with ADLS  - Monitor and promote a balance of rest/activity, with adequate nutrition and elimination   Outcome: Progressing     Problem: Ineffective Coping  Goal: Identifies ineffective coping skills  Outcome: Progressing  Goal: Identifies healthy coping skills  Outcome: Progressing  Goal: Demonstrates healthy coping skills  Outcome: Progressing  Goal: Participates in unit activities  Description: Interventions:  - Provide therapeutic environment   - Provide required programming   - Redirect  inappropriate behaviors   Outcome: Progressing     Problem: Depression  Goal: Treatment Goal: Demonstrate behavioral control of depressive symptoms, verbalize feelings of improved mood/affect, and adopt new coping skills prior to discharge  Outcome: Progressing  Goal: Verbalize thoughts and feelings  Description: Interventions:  - Assess and re-assess patient's level of risk   - Engage patient in 1:1 interactions, daily, for a minimum of 15 minutes   - Encourage patient to express feelings, fears, frustrations, hopes   Outcome: Progressing  Goal: Refrain from isolation  Description: Interventions:  - Develop a trusting relationship   - Encourage socialization   Outcome: Progressing     Problem: Anxiety  Goal: Anxiety is at manageable level  Description: Interventions:  - Assess and monitor patient's anxiety level.   - Monitor for signs and symptoms (heart palpitations, chest pain, shortness of breath, headaches, nausea, feeling jumpy, restlessness, irritable, apprehensive).   - Collaborate with interdisciplinary team and initiate plan and interventions as ordered.  - [place] patient to unit/surroundings  - Explain treatment plan  - Encourage participation in care  - Encourage verbalization of concerns/fears  - Identify coping mechanisms  - Assist in developing anxiety-reducing skills  - Administer/offer alternative therapies  - Limit or eliminate stimulants  Outcome: Progressing     Problem: Alteration in Orientation  Goal: Interact with staff daily  Description: Interventions:  - Assess and re-assess patient's level of orientation  - Engage patient in 1 on 1 interactions, daily, for a minimum of 15 minutes   - Establish rapport/trust with patient   Outcome: Progressing  Goal: Allow medical examinations, as recommended  Description: Interventions:  - Provide physical/neurological exams and/or referrals, per provider   Outcome: Progressing  Goal: Cooperate with recommended testing/procedures  Description:  Interventions:  - Determine need for ancillary testing  - Observe for mental status changes  - Implement falls/precaution protocol   Outcome: Progressing  Goal: Complete daily ADLs, including personal hygiene independently, as able  Description: Interventions:  - Observe, teach, and assist patient with ADLS  Outcome: Progressing     Problem: DISCHARGE PLANNING - CARE MANAGEMENT  Goal: Discharge to post-acute care or home with appropriate resources  Description: INTERVENTIONS:  - Conduct assessment to determine patient/family and health care team treatment goals, and need for post-acute services based on payer coverage, community resources, and patient preferences, and barriers to discharge  - Address psychosocial, clinical, and financial barriers to discharge as identified in assessment in conjunction with the patient/family and health care team  - Arrange appropriate level of post-acute services according to patient's   needs and preference and payer coverage in collaboration with the physician and health care team  - Communicate with and update the patient/family, physician, and health care team regarding progress on the discharge plan  - Arrange appropriate transportation to post-acute venues  Outcome: Progressing

## 2024-12-17 NOTE — NURSING NOTE
"Alberto was calm and cooperative during assessment. He reports mild anxiety and depression and AH of a voice stating \"it wants to harm me and my family.\" He denies current SI, SH, HI, and VH. He attended meals in the dining room and group programming in the milieu. He was social with peers while walking and during free time. He took a nap this afternoon. He was medication compliant and requested PRN nicorette gum. He denies new concerns at this time.   "

## 2024-12-17 NOTE — ASSESSMENT & PLAN NOTE
Reviewed 12/17/24, on nicotine gum as as needed and encouraged smoking cessation  Continue to encourage cessation upon discharge

## 2024-12-17 NOTE — NURSING NOTE
Alberto maintain on ongoing SAFE precaution without incident on this shift. Observed resting in bed, respiration even and unlabored. Continuous Q 15 minutes check implemented thru the night. Schedule lab: CK to be obtain this morning.. No behavioral noted

## 2024-12-17 NOTE — ASSESSMENT & PLAN NOTE
Reviewed 12/17/24  Follows with medical  Continue famotidine 20 mg tablet BID per medical team   Continue glycopyrrolate 1 mg tablet BID AM and afternoon per medical team  Continue glycopyrrolate 2 mg tablet before bed per medical team  Continue pantoprazole EC 20 mg tablet every morning per medical team  This is a chronic condition, and is stable unless otherwise noted.

## 2024-12-17 NOTE — PROGRESS NOTES
12/17/24 0822   Team Meeting   Meeting Type Daily Rounds   Team Members Present   Team Members Present Physician;Nurse;;Other (Discipline and Name)   Physician Team Member Thomas, Holter   Nursing Team Member Claudia   Social Work Team Member Jose J ORTEGA   Other (Discipline and Name) Serg Grace Pharm D    Patient/Family Present   Patient Present No   Patient's Family Present No     Groups Participation  9/9.    Patient's compliant with medications. He's engaged in his treatment. He's appropriate. He's social with peers. D/C pending [facility] funding services. Labs completed. Reports AH, depression but uses coping skills.

## 2024-12-17 NOTE — ASSESSMENT & PLAN NOTE
Reviewed on 12/17/2024.  No significant changes with same threatening voices but trying to cope by watching TV and talking to peers doing exercise and using the punching bag and tried to learn to live with them  CK level has been high and patient is encouraged to drink more fluids.  No behavioral concerns.    Continue medication as follows:  Clozapine 250 mg daily at bedtime for psychosis - increased 12/11/24  To consider further careful titration  CBC w/diff and CK every 2 weeks  CK was increased to 960 on 12/12 and oral hydration encouraged, CK improved to 905 on 12/13/24  Repeat CK today and came down to 674  Continue Abilify 30 mg once daily for psychosis as he is resistant to single antipsychotic  Continue Escitalopram 20 mg once daily for depression and anxiety  Continue Robinul 1 mg BID and 2 mg QHS for sialorrhea from Clozapine  Continue Atropine 1% solution 1 drop sublingual QHS for adverse effects of Clozapine  Continue Propanolol 10 mg BID for anxiety   Continue Melatonin 9 mg QHS for sleep  Continue Senna-Docusate sodium one 50 mg tablet daily before bed for constipation    Pt remains on dual antipsychotic Tx due to failure on monotherapy   .  S/p ECT treatments.  - ACT team referral was made for him living back with his aunt once MA funding comes through from the [facility]

## 2024-12-17 NOTE — PROGRESS NOTES
Progress Note - Behavioral Health   Name: Alberto Berumen 29 y.o. male I MRN: [id number]  Unit/Bed#: [facility] 112-02 I Date of Admission: 3/29/2024   Date of Service: 12/17/2024 I Hospital Day: 263     Assessment & Plan  Schizoaffective disorder, bipolar type (HCC)  Reviewed on 12/17/2024.  No significant changes with same threatening voices but trying to cope by watching TV and talking to peers doing exercise and using the punching bag and tried to learn to live with them  CK level has been high and patient is encouraged to drink more fluids.  No behavioral concerns.    Continue medication as follows:  Clozapine 250 mg daily at bedtime for psychosis - increased 12/11/24  To consider further careful titration  CBC w/diff and CK every 2 weeks  CK was increased to 960 on 12/12 and oral hydration encouraged, CK improved to 905 on 12/13/24  Repeat CK today and came down to 674  Continue Abilify 30 mg once daily for psychosis as he is resistant to single antipsychotic  Continue Escitalopram 20 mg once daily for depression and anxiety  Continue Robinul 1 mg BID and 2 mg QHS for sialorrhea from Clozapine  Continue Atropine 1% solution 1 drop sublingual QHS for adverse effects of Clozapine  Continue Propanolol 10 mg BID for anxiety   Continue Melatonin 9 mg QHS for sleep  Continue Senna-Docusate sodium one 50 mg tablet daily before bed for constipation    Pt remains on dual antipsychotic Tx due to failure on monotherapy   .  S/p ECT treatments.  - ACT team referral was made for him living back with his aunt once MA funding comes through from the [facility]  Chronic idiopathic constipation  Reviewed 12/17/24  Follows with medical team  Continue senna-docusate sodium dose per medical team.   GERD (gastroesophageal reflux disease)  Reviewed 12/17/24  Follows with medical  Continue famotidine 20 mg tablet BID per medical team   Continue glycopyrrolate 1 mg tablet BID AM and afternoon per medical team  Continue  glycopyrrolate 2 mg tablet before bed per medical team  Continue pantoprazole EC 20 mg tablet every morning per medical team  This is a chronic condition, and is stable unless otherwise noted.  Tobacco abuse  Reviewed 12/17/24, on nicotine gum as as needed and encouraged smoking cessation  Continue to encourage cessation upon discharge    Elevated hemoglobin A1c  Reviewed 12/17/24  Follows with medical team    Class 2 obesity in adult  Reviewed 12/17/24  Follows with medical team and given dietary counseling and need for exercise    Primary hypertension  Reviewed 12/17/24  Follows with medical team on hydrochlorothiazide and Norvasc      Patient Active Problem List   Diagnosis    GERD (gastroesophageal reflux disease)    Schizoaffective disorder, bipolar type (HCC)    Tobacco abuse    T wave inversion in EKG    Syringoma    Chronic idiopathic constipation    Vitamin B 12 deficiency    Vitamin D deficiency    Confluent and reticulate papillomatosis    Class 2 obesity in adult    Primary hypertension    Elevated hemoglobin A1c    Bilateral lower extremity edema     Review of systems: CK to be repeated today, came back as 674 compared to 905 on 12/13/24, encouraged to drink fluids   assessment  Overall Status: Status quo with no significant changes  certification Statement: The patient will continue to require additional inpatient hospital stay due to ongoing voices that are threatening to mixing lack of response to medications and ECT until eligible for an ACT team to live with his aunt.     Medications:  propranolol 10 mg every 12 hours  for anxiety, Abilify 30 mg mg at bedtime Lexapro 20 mg once a day,  and senna once a day day for constipation clozapine 250 mg at bedtime, Robinul 1 mg twice a day and 2 mg at bedtime for drooling from Clozapine, Colace 100 mg twice a day with MiraLAX daily for constipation from Clozapine  All medications reviewed d  side effects from treatment: None reported  Medication changes    Clozapine increased to 250 mg on 12/11/2024 from 220 mg  medication education  Risks side effects benefits and precautions of medications discussed with patient and he did verbalize an understanding about risks for metabolic syndrome from being on neuroleptics and risk for tardive dyskinesia etc.   Understanding of medications: Has some understanding  Justification for dual anti-psychotics: due to not responding to single antipsychotics    Non-pharmacological treatments  Continue with individual, group, milieu and occupational therapy using recovery principles and psycho-education about accepting illness and the need for treatment.  Waiting for [facility] to fund an ACT team as he lost his MA benefits and to live with aunt in the [facility]  Maintenance ECTs  completed several months ago  Refuses to see a dietician and agrees to do more exercises as he is about 100 lbs overweight   Prolactin level high so Risperdal replaced with Abilify which he has tolerated well so far with prolactin level back to normal  Counseled to take the stool softeners and laxatives regularly  Dietary counseling given to watch diet and to exercise    Safety  Safety and communication plan established to target dynamic risk factors discussed above.    Discharge Plan back to his aunt when [facility] starts funding for ACT program as his medical assistance benefits are no longer available since he is on Medicare and not eligible for medical assistance  Interval Progress   Still hearing same voices that are threatening and never commanding as usual and tells me he is trying to watch TV, talk to peers, to exercise by walking the hallways and using the punching bag as a coping mechanism.  He understands he had they have been around for more than 3 years and he has learned to live with.  Trying to drink fluids and will get his CK checked today.  Not aggressive or agitated or threatening or self-abusive with no need for behavioral PRNs in the last 24 hours.   Has not been excessively sad as well as and some sadness over symptoms not going away completely.  acceptance by patient: Accepting  Hopefulness in recovery: Living with his aunt  Involved in reintegration process: Talking to her and meeting with siblings on the [facility] and also talking with aunt from the [facility]  trusting in relationship with psychiatrist: Trusting  Sleep: Good  Appetite: Good  Compliance with Medications: Good  Group attendance: Most of the groups 9/9  Significant events and progress towards goals: Status quo    Mental Status Exam  Appearance: age appropriate, improved grooming, looks older than stated age, overweight, with hair in dreadlocks casually dressed with a clean shaven face, fairly groomed with good eye contact found pacing the hallway with good eye contact well-groomed  behavior: Pleasant and friendly cooperative and less anxious  speech: normal rate, normal volume, normal pitch  Mood: dysphoric, anxious, continues to report feeling very good because of the voices that are never going away  affect: constricted, inappropriate, mood-congruent, with no good mood reactivity .    thought Process: organized, logical, coherent, goal directed, linear, decreased rate of thoughts, slowing of thoughts, negative thinking, impaired abstract reasoning, concrete  Thought Content: paranoid ideation, some paranoia, grandiose ideas, intrusive thoughts, preoccupied, chronic, continues to report paranoia about people threatening to kill him and his family because of the voices.  He believes ECT has helped so that he is not much in his head.  No other delusions elicited.  No current suicidal or homicidal thoughts and no plans verbalized but today reports having passive death wishes because of voices with no plans and able to CFS and it has not changed yet  .  No phobias obsessions compulsions or distorted body perceptions reported.   Perceptual Disturbances: Still reporting same threatening voices as before not  commanding   Hx Risk Factors: chronic psychiatric problems, chronic anxiety symptoms, chronic psychotic symptoms,    Sensorium: Oriented x 3 spheres and situation  Attention and concentration span: Intact  Cognition: recent and remote memory grossly intact  Consciousness: alert and awake  Intellect: appears to be of average intelligence  Insight: intact  Judgement: intact  Motor Activity: no abnormal movements     Vitals  Temp:  [97.3 °F (36.3 °C)-97.5 °F (36.4 °C)] 97.5 °F (36.4 °C)  HR:  [82-98] 98  Resp:  [18] 18  BP: (115-132)/(63-75) 132/63  SpO2:  [98 %-99 %] 99 %  No intake or output data in the 24 hours ending 12/17/24 0343        Lab Results: All labs reviewed and prolactin level came down to normal on 8/1/2024, clozapine level 820 on 9/5/24  Current Facility-Administered Medications   Medication Dose Route Frequency Provider Last Rate    acetaminophen  650 mg Oral Q4H PRN Jordan C Holter, DO      acetaminophen  650 mg Oral Q6H PRN HOLLI Lion      aluminum-magnesium hydroxide-simethicone  30 mL Oral Q4H PRN Jordan C Holter, DO      amLODIPine  5 mg Oral Daily HOLLI Lion      ARIPiprazole  30 mg Oral Daily Bora Rosario MD      Artificial Tears  1 drop Both Eyes Q3H PRN Jordan C Holter, DO      atropine  1 drop Sublingual HS Harjeet Torres, DO      haloperidol lactate  2.5 mg Intramuscular Q4H PRN Max 4/day HOLLI Lion      And    LORazepam  1 mg Intramuscular Q4H PRN Max 4/day HOLLI Lion      And    benztropine  0.5 mg Intramuscular Q4H PRN Max 4/day HOLLI Lion      benztropine  1 mg Intramuscular Q4H PRN Max 6/day Jordan C Holter,       haloperidol lactate  5 mg Intramuscular Q4H PRN Max 4/day HOLLI Lion      And    LORazepam  2 mg Intramuscular Q4H PRN Max 4/day HOLLI Lion      And    benztropine  1 mg Intramuscular Q4H PRN Max 4/day HOLLI Lion      benztropine  1 mg Oral Q4H PRN Max 6/day HOLLI Lion      benztropine  1 mg Oral Q4H  PRN Max 6/day Ion C Holter, DO      bisacodyl  10 mg Rectal Daily PRN HOLLI Lion      calcium carbonate  500 mg Oral BID PRN HOLLI Monge      cloZAPine  250 mg Oral HS Bora Rosario MD      hydrOXYzine HCL  50 mg Oral Q6H PRN Max 4/day Ion C Holter, DO      Or    diphenhydrAMINE  50 mg Intramuscular Q6H PRN [facility] Holter, DO      hydrOXYzine HCL  50 mg Oral Q6H PRN Max 4/day HOLLI Lion      Or    diphenhydrAMINE  50 mg Intramuscular Q6H PRN HOLLI Loin      diphenhydrAMINE-zinc acetate   Topical BID PRN HOLLI Lion      docusate sodium  100 mg Oral BID Jourdan Dickens,       escitalopram  20 mg Oral Daily HOLLI Lion      famotidine  20 mg Oral BID PRN HOLLI Monge      fluticasone  1 spray Each Nare Daily PRN HOLLI Monge      glycopyrrolate  1 mg Oral BID (AM & Afternoon) Bora Rosario MD      glycopyrrolate  2 mg Oral HS Bora Rosario MD      haloperidol  1 mg Oral Q6H PRN HOLLI Lion      haloperidol  2.5 mg Oral Q4H PRN Max 4/day HOLLI Lion      haloperidol  5 mg Oral Q4H PRN Max 4/day HOLLI Lion      hydroCHLOROthiazide  12.5 mg Oral Daily HOLLI Galvan      hydrocortisone   Topical 4x Daily PRN HOLLI Lion      hydrOXYzine HCL  100 mg Oral Q6H PRN Max 4/day [facility] Holter, DO      Or    LORazepam  2 mg Intramuscular Q6H PRN [facility] Holter, DO      hydrOXYzine HCL  100 mg Oral Q6H PRN Max 4/day HOLLI Lion      Or    LORazepam  2 mg Intramuscular Q6H PRN HOLLI Lion      hydrOXYzine HCL  25 mg Oral Q6H PRN Max 4/day Ion C Holter, DO      ibuprofen  600 mg Oral Q8H PRN HOLLI Lion      influenza vaccine  0.5 mL Intramuscular Once Bora Rosario MD      loratadine  10 mg Oral Daily Brina Guillen MD      magnesium hydroxide  30 mL Oral Once Jourdan Dickens, DO      melatonin  3 mg Oral HS PRN Bora Rosario MD      melatonin  9 mg Oral HS Bora DE GUZMAN  MD Abraham      methocarbamol  500 mg Oral Q6H PRN HOLLI Lion      multivitamin-minerals  1 tablet Oral Daily Eileen Holliday HOLLI Jensen      nicotine polacrilex  2 mg Oral Q4H PRN Bora Rosario MD      OLANZapine  5 mg Oral Q4H PRN Max 3/day [facility] Holter, DO      Or    OLANZapine  2.5 mg Intramuscular Q4H PRN Max 3/day [facility] Holter, DO      OLANZapine  5 mg Oral Q3H PRN Max 3/day [facility] Holter, DO      Or    OLANZapine  5 mg Intramuscular Q3H PRN Max 3/day [facility] Holter, DO      OLANZapine  2.5 mg Oral Q4H PRN Max 6/day [facility] Holter, DO      ondansetron  4 mg Oral Q6H PRN HOLLI Lion      pantoprazole  20 mg Oral QAM [facility] Holter, DO      polyethylene glycol  17 g Oral Daily PRN Sofi Yoo, HOLLI      polyethylene glycol  17 g Oral Daily Jourdan Dickens, DO      propranolol  10 mg Oral Q12H KAYLIE HOLLI Lion      senna-docusate sodium  1 tablet Oral Daily PRN [facility] Holter, DO      senna-docusate sodium  2 tablet Oral HS Jourdan Dickens, DO      traZODone  50 mg Oral HS PRN HOLLI Lion      white petrolatum-mineral oil   Topical TID PRN HOLLI Lion         Counseling / Coordination of Care: Total floor / unit time spent today 15 minutes. Greater than 50% of total time was spent with the patient and / or family counseling and / or somewhat receptive to supportive listening and teaching positive coping skills to deal with symptom mangement.     Patient's Rights, confidentiality and exceptions to confidentiality, use of automated medical record, Behavioral Health Services staff access to medical record, and consent to treatment reviewed.    This note has been dictated and hence there may be problems with punctuation, spelling and formatting and if anyone has any concerns please address them to Dr. Rosario   This note is not shared with patient due to potential for making patient's condition worse by knowing the content of the note.  Ms. Hugh patel  medications occasionally but that she is not happy

## 2024-12-17 NOTE — ASSESSMENT & PLAN NOTE
Reviewed 12/17/24  Follows with medical team  Continue senna-docusate sodium dose per medical team.

## 2024-12-18 PROCEDURE — 99232 SBSQ HOSP IP/OBS MODERATE 35: CPT | Performed by: PSYCHIATRY & NEUROLOGY

## 2024-12-18 RX ADMIN — AMLODIPINE BESYLATE 5 MG: 5 TABLET ORAL at 09:28

## 2024-12-18 RX ADMIN — NICOTINE POLACRILEX 2 MG: 2 GUM, CHEWING ORAL at 17:06

## 2024-12-18 RX ADMIN — NICOTINE POLACRILEX 2 MG: 2 GUM, CHEWING ORAL at 12:58

## 2024-12-18 RX ADMIN — NICOTINE POLACRILEX 2 MG: 2 GUM, CHEWING ORAL at 09:28

## 2024-12-18 RX ADMIN — ARIPIPRAZOLE 30 MG: 15 TABLET ORAL at 09:27

## 2024-12-18 RX ADMIN — HYDROCHLOROTHIAZIDE 12.5 MG: 12.5 TABLET ORAL at 09:28

## 2024-12-18 RX ADMIN — MULTIPLE VITAMINS W/ MINERALS TAB 1 TABLET: TAB ORAL at 09:28

## 2024-12-18 RX ADMIN — PROPRANOLOL HYDROCHLORIDE 10 MG: 10 TABLET ORAL at 09:28

## 2024-12-18 RX ADMIN — LORATADINE 10 MG: 10 TABLET ORAL at 09:28

## 2024-12-18 RX ADMIN — NICOTINE POLACRILEX 2 MG: 2 GUM, CHEWING ORAL at 21:15

## 2024-12-18 RX ADMIN — GLYCOPYRROLATE 2 MG: 1 TABLET ORAL at 21:14

## 2024-12-18 RX ADMIN — SENNOSIDES AND DOCUSATE SODIUM 2 TABLET: 50; 8.6 TABLET ORAL at 21:14

## 2024-12-18 RX ADMIN — PROPRANOLOL HYDROCHLORIDE 10 MG: 10 TABLET ORAL at 21:15

## 2024-12-18 RX ADMIN — GLYCOPYRROLATE 1 MG: 1 TABLET ORAL at 09:28

## 2024-12-18 RX ADMIN — ATROPINE SULFATE 1 DROP: 10 SOLUTION/ DROPS OPHTHALMIC at 21:14

## 2024-12-18 RX ADMIN — ESCITALOPRAM OXALATE 20 MG: 10 TABLET, FILM COATED ORAL at 09:27

## 2024-12-18 RX ADMIN — MELATONIN TAB 3 MG 9 MG: 3 TAB at 21:15

## 2024-12-18 RX ADMIN — DOCUSATE SODIUM 100 MG: 100 CAPSULE, LIQUID FILLED ORAL at 09:28

## 2024-12-18 RX ADMIN — DOCUSATE SODIUM 100 MG: 100 CAPSULE, LIQUID FILLED ORAL at 17:06

## 2024-12-18 RX ADMIN — PANTOPRAZOLE SODIUM 20 MG: 20 TABLET, DELAYED RELEASE ORAL at 09:27

## 2024-12-18 RX ADMIN — CLOZAPINE 250 MG: 25 TABLET ORAL at 21:15

## 2024-12-18 RX ADMIN — GLYCOPYRROLATE 1 MG: 1 TABLET ORAL at 13:05

## 2024-12-18 NOTE — PLAN OF CARE
Problem: Ineffective Coping  Goal: Identifies ineffective coping skills  12/18/2024 0946 by Irais Echeverria  Outcome: Progressing  Goal: Identifies healthy coping skills  12/18/2024 0946 by Irais Echeverria  Outcome: Progressing  Goal: Demonstrates healthy coping skills  12/18/2024 0946 by Irais Echeverria  Outcome: Progressing  Goal: Participates in unit activities  Description: Interventions:  - Provide therapeutic environment   - Provide required programming   - Redirect inappropriate behaviors   12/18/2024 0946 by Irais Echeverria  Outcome: Progressing     He continues to attend and participate in groups.

## 2024-12-18 NOTE — NURSING NOTE
Pt is visible in the milieu and social with select peers. He consumed 100 % of dinner. Pt is pleasant, polite, and cooperative. Brightens on approach. Took medications without incidence. Refused HS atropine drops. Reports AH of voices wanting to harm him and his family and mild anxiety and depression. Denies SI/HI/VH. Pt observed responding to internal stimuli when administering HS medications. No unmet needs. No behavioral issues.

## 2024-12-18 NOTE — PROGRESS NOTES
12/18/24 1038   Team Meeting   Meeting Type Daily Rounds   Team Members Present   Team Members Present Physician;Nurse;;Other (Discipline and Name)   Physician Team Member Thomas, Holter, Spoleti   Nursing Team Member Claudia   Social Work Team Member Jose J ORTEGA   Other (Discipline and Name) Wang PCM   Patient/Family Present   Patient Present No   Patient's Family Present No     Groups Participation  8/11.   Patient's compliant with medications. He's engaged in his treatment. Depressed at times, but uses his coping skills. Waiting for CAT services to discharge to home with family.

## 2024-12-18 NOTE — ASSESSMENT & PLAN NOTE
Reviewed 12/18/24, on nicotine gum as as needed and encouraged smoking cessation  Continue to encourage cessation upon discharge

## 2024-12-18 NOTE — ASSESSMENT & PLAN NOTE
Reviewed 12/18/24  Follows with medical team  Continue senna-docusate sodium dose per medical team.

## 2024-12-18 NOTE — PROGRESS NOTES
Progress Note - Behavioral Health   Name: Alberto Berumen 29 y.o. male I MRN: [id number]  Unit/Bed#: [facility] 112-02 I Date of Admission: 3/29/2024   Date of Service: 12/18/2024 I Hospital Day: 264     Assessment & Plan  Schizoaffective disorder, bipolar type (HCC)  Reviewed on 12/18/2024.  No significant changes with same threatening voices, but trying to cope by working out, talking to peers, and using the punching bag.  CK level has been high and patient is encouraged to drink more fluids.  No behavioral concerns.    Continue medication as follows:  Clozapine 250 mg QHS for psychosis - increased 12/11/24  To consider further careful titration  CBC w/diff and CK every 2 weeks  CK was increased to 960 on 12/12 and oral hydration encouraged, CK improved to 905 on 12/13/24. Repeat on 12/17/24 decreased to 674.  Continue Abilify 30 mg daily for psychosis as he is resistant to single antipsychotic  Continue Escitalopram 20 mg daily for depression and anxiety  Continue Robinul 1 mg BID and 2 mg QHS for sialorrhea from Clozapine  Continue Atropine 1% solution 1 drop sublingually QHS for adverse effects of Clozapine  Continue Propanolol 10 mg BID for anxiety   Continue Melatonin 9 mg QHS for sleep  Continue Senna-Docusate sodium one 50 mg tablet QHS for constipation    Pt remains on dual antipsychotic Tx due to failure on monotherapy   .  S/p ECT treatments.  - ACT team referral was made for him living back with his aunt once MA funding comes through from the [facility]  Chronic idiopathic constipation  Reviewed 12/18/24  Follows with medical team  Continue senna-docusate sodium dose per medical team.   GERD (gastroesophageal reflux disease)  Reviewed 12/18/24  Follows with medical  Continue famotidine 20 mg tablet BID per medical team   Continue glycopyrrolate 1 mg tablet BID AM and afternoon per medical team  Continue glycopyrrolate 2 mg tablet before bed per medical team  Continue pantoprazole EC 20 mg tablet  every morning per medical team  This is a chronic condition, and is stable unless otherwise noted.  Tobacco abuse  Reviewed 12/18/24, on nicotine gum as as needed and encouraged smoking cessation  Continue to encourage cessation upon discharge    Elevated hemoglobin A1c  Reviewed 12/18/24  Follows with medical team    Class 2 obesity in adult  Reviewed 12/18/24  Follows with medical team and given dietary counseling and need for exercise    Primary hypertension  Reviewed 12/18/24  Follows with medical team on hydrochlorothiazide and Norvasc      Psychiatry Progress Note [facility]    Progress towards goals: progressing slowly     Review of systems: denied    Psychiatric Diagnosis: Schizoaffective disorder, bipolar type  Tobacco use disorder    Assessment  Overall Status:  progressing, no significant changes  Certification Statement: The patient will continue to require additional inpatient hospital stay due to psychotic related symptoms and limited response to medications and ECT.      Medications: as above  Side effects from treatment: denied  Medication changes: as above  Medication education: Risks side effects benefits and precautions of medications discussed with patient and they did verbalize an understanding about risks for metabolic syndrome from being on neuroleptics and tardive dyskinesia etc.  All medications reviewed and I recommend that they be continued for symptom management  Understanding of medications: Risks side effects benefits and precautions of medications discussed with patient and they did verbalize an understanding about risks for metabolic syndrome from being on neuroleptics and tardive dyskinesia etc., and patient appeared to have understanding.  Justification for dual anti-psychotics: due to not responding to single antipsychotics.    Non-pharmacological treatments  Continue with individual, group, milieu and occupational therapy using recovery principles and  "psycho-education about accepting illness and the need for treatment.  Behavioral health checks every 7 minutes    Safety  Safety and communication plan established to target dynamic risk factors discussed above.    Discharge Plan   To his Aunt when [facility] funding for ACT received.    Interval Progress: Per treatment team, patient has been compliant with psychiatric medications and treatment, cooperative with care, and social with select peers. Patient intermittently refuses Atropine sublingually and Miralax daily - does not refuse standing psychotropic medications.  The patient did not require psychotropic PRN medications yesterday, he does utilize PRN Nicorette gum daily.    Today, the patient endorses feeling, \"the same,\" as prior evaluation earlier this week. He endorses good sleep and appetite. Endorses a bowel movement prior to evaluation this morning. The patient reports AH of voices telling him they will kill him/threatening him continue. The patient denies VH. Discussed with patient coping strategies he can utilize to help manage psychotic related symptoms - he reports exercising does continue to help, and the voices have been less loud at night compared to prior.  The patient denies SI, HI, passive death wishes, or thoughts to harm self or others at time of evaluation.   Reviewed most recent CK level and continued decrease with patient - continued to encourage oral hydration and he expressed understanding. He denied adverse effects of medication at time of evaluation.    Acceptance by patient: accepting of inpatient treatment  Hopefulness in recovery: to live with Aunt  Involved in reintegration process: speaks with siblings and Aunt in community  Trusting in relationship with psychiatrist: trusting  Sleep: good  Appetite: good  Compliance with Medications: compliant  Group attendance: attended 8/10 groups  Significant events: none in the past 24 hours    Mental Status Exam  Appearance: age appropriate, " "casually dressed, adequate grooming, wearing a restrepo  Behavior: cooperative, calm, good eye contact  Speech: normal rate, normal volume, normal pitch, clear, coherent, not pressured, not hyperverbal  Mood:  \"the same\"  Affect: constricted, mood-congruent  Thought Process:  organized, generally logical and linear, coherent, appears to have decreased rate of thoughts   Thought Content: some paranoia, negative thoughts, intrusive thoughts  Perceptual Disturbances: auditory hallucinations of voices telling him they will kill him, denies visual hallucinations when asked, does not appear responding to internal stimuli  Risk Potential: Suicidal ideations: denies    Homicidal ideations: denies    Potential for Aggression: not at present  Sensorium: alert and oriented to person, place, time, and situation  Cognition: recent and remote memory grossly intact  Consciousness: alert and awake  Attention: attention span and concentration are age appropriate  Intellect: appears to be of average intelligence  Insight: intact  Judgement: intact  Motor Activity: no abnormal movements     Vitals  Temp:  [97.7 °F (36.5 °C)-98.2 °F (36.8 °C)] 97.7 °F (36.5 °C)  HR:  [] 83  Resp:  [17-18] 17  BP: (111-132)/(60-75) 130/73  SpO2:  [97 %-99 %] 99 %  No intake or output data in the 24 hours ending 12/18/24 0817    Lab Results: All Labs Within Last 24 Hours Reviewed          Current Facility-Administered Medications   Medication Dose Route Frequency Provider Last Rate    acetaminophen  650 mg Oral Q4H PRN Jordan C Holter, DO      acetaminophen  650 mg Oral Q6H PRN HOLLI Lion      aluminum-magnesium hydroxide-simethicone  30 mL Oral Q4H PRN Jordan C Holter, DO      amLODIPine  5 mg Oral Daily HOLLI Lion      ARIPiprazole  30 mg Oral Daily Bora Rosario MD      Artificial Tears  1 drop Both Eyes Q3H PRN Jordan C Holter, DO      atropine  1 drop Sublingual  Harjeet Torres DO      haloperidol lactate  2.5 mg Intramuscular " Q4H PRN Max 4/day STEVE LionNP      And    LORazepam  1 mg Intramuscular Q4H PRN Max 4/day HOLLI Lion      And    benztropine  0.5 mg Intramuscular Q4H PRN Max 4/day HOLLI Lion      benztropine  1 mg Intramuscular Q4H PRN Max 6/day [facility] Holter, DO      haloperidol lactate  5 mg Intramuscular Q4H PRN Max 4/day HOLLI Lion      And    LORazepam  2 mg Intramuscular Q4H PRN Max 4/day STEVE LionNP      And    benztropine  1 mg Intramuscular Q4H PRN Max 4/day HOLLI Lion      benztropine  1 mg Oral Q4H PRN Max 6/day HOLLI Lion      benztropine  1 mg Oral Q4H PRN Max 6/day [facility] Holter, DO      bisacodyl  10 mg Rectal Daily PRN HOLLI Lion      calcium carbonate  500 mg Oral BID PRN HOLLI Monge      cloZAPine  250 mg Oral HS Bora Rosario MD      hydrOXYzine HCL  50 mg Oral Q6H PRN Max 4/day [facility] Holter, DO      Or    diphenhydrAMINE  50 mg Intramuscular Q6H PRN [facility] Holter, DO      hydrOXYzine HCL  50 mg Oral Q6H PRN Max 4/day HOLLI Lion      Or    diphenhydrAMINE  50 mg Intramuscular Q6H PRN HOLLI Lion      diphenhydrAMINE-zinc acetate   Topical BID PRN HOLLI Lion      docusate sodium  100 mg Oral BID Jourdan Dickens, DO      escitalopram  20 mg Oral Daily HOLLI Lion      famotidine  20 mg Oral BID PRN HOLLI Monge      fluticasone  1 spray Each Nare Daily PRN HOLLI Monge      glycopyrrolate  1 mg Oral BID (AM & Afternoon) Bora Rosario MD      glycopyrrolate  2 mg Oral HS Bora Rosario MD      haloperidol  1 mg Oral Q6H PRN HOLLI Lion      haloperidol  2.5 mg Oral Q4H PRN Max 4/day HOLLI Lion      haloperidol  5 mg Oral Q4H PRN Max 4/day HOLLI Lion      hydroCHLOROthiazide  12.5 mg Oral Daily HOLLI Galvan      hydrocortisone   Topical 4x Daily PRN HOLLI Lion      hydrOXYzine HCL  100 mg Oral Q6H PRN Max 4/day Ion FISCHER  Holter, DO      Or    LORazepam  2 mg Intramuscular Q6H PRN [facility] Cresencioter, DO      hydrOXYzine HCL  100 mg Oral Q6H PRN Max 4/day HOLLI Lion      Or    LORazepam  2 mg Intramuscular Q6H PRN HOLLI Lion      hydrOXYzine HCL  25 mg Oral Q6H PRN Max 4/day [facility] Cresencioter, DO      ibuprofen  600 mg Oral Q8H PRN HOLLI Lion      influenza vaccine  0.5 mL Intramuscular Once Bora Rosario MD      loratadine  10 mg Oral Daily Brina Guillen MD      magnesium hydroxide  30 mL Oral Once Jourdan Dickens, DO      melatonin  3 mg Oral HS PRN Bora Rosario MD      melatonin  9 mg Oral HS Bora Rosario MD      methocarbamol  500 mg Oral Q6H PRN HOLLI Lion      multivitamin-minerals  1 tablet Oral Daily Eileen CherryHOLLI Jimenez      nicotine polacrilex  2 mg Oral Q4H PRN Bora Rosario MD      OLANZapine  5 mg Oral Q4H PRN Max 3/day [facility] Cresencioter, DO      Or    OLANZapine  2.5 mg Intramuscular Q4H PRN Max 3/day [facility] Holter, DO      OLANZapine  5 mg Oral Q3H PRN Max 3/day [facility] Holter, DO      Or    OLANZapine  5 mg Intramuscular Q3H PRN Max 3/day [facility] Cresencioter, DO      OLANZapine  2.5 mg Oral Q4H PRN Max 6/day [facility] Holter, DO      ondansetron  4 mg Oral Q6H PRN HOLLI Lion      pantoprazole  20 mg Oral [facility] Holter, DO      polyethylene glycol  17 g Oral Daily PRN HOLLI Ghotra      polyethylene glycol  17 g Oral Daily Jourdan Dickens, DO      propranolol  10 mg Oral Q12H KAYLEI HOLLI Lion      senna-docusate sodium  1 tablet Oral Daily PRN [facility] Holter, DO      senna-docusate sodium  2 tablet Oral HS Jourdan Dickens, DO      traZODone  50 mg Oral HS PRN HOLLI Lion      white petrolatum-mineral oil   Topical TID PRN HOLLI Lion         Counseling / Coordination of Care: Total floor / unit time spent today 15 minutes. Greater than 50% of total time was spent with the patient and / or family counseling and / or somewhat receptive to  supportive listening and teaching positive coping skills to deal with symptom mangement.     Patient's Rights, confidentiality and exceptions to confidentiality, use of automated medical record, Behavioral Health Services staff access to medical record, and consent to treatment reviewed.    This note has been dictated and hence there may be problems with punctuation, spelling and formatting and if anyone has any concerns please address them to Dr. Mora.  This note is not shared with patient due to potential for making patient's condition worse by knowing the content of the note.    Hollie Mora,   Psychiatry Resident, PGY-IV

## 2024-12-18 NOTE — ASSESSMENT & PLAN NOTE
Reviewed on 12/18/2024.  No significant changes with same threatening voices, but trying to cope by working out, talking to peers, and using the punching bag.  CK level has been high and patient is encouraged to drink more fluids.  No behavioral concerns.    Continue medication as follows:  Clozapine 250 mg QHS for psychosis - increased 12/11/24  To consider further careful titration  CBC w/diff and CK every 2 weeks  CK was increased to 960 on 12/12 and oral hydration encouraged, CK improved to 905 on 12/13/24. Repeat on 12/17/24 decreased to 674.  Continue Abilify 30 mg daily for psychosis as he is resistant to single antipsychotic  Continue Escitalopram 20 mg daily for depression and anxiety  Continue Robinul 1 mg BID and 2 mg QHS for sialorrhea from Clozapine  Continue Atropine 1% solution 1 drop sublingually QHS for adverse effects of Clozapine  Continue Propanolol 10 mg BID for anxiety   Continue Melatonin 9 mg QHS for sleep  Continue Senna-Docusate sodium one 50 mg tablet QHS for constipation    Pt remains on dual antipsychotic Tx due to failure on monotherapy   .  S/p ECT treatments.  - ACT team referral was made for him living back with his aunt once MA funding comes through from the [facility]

## 2024-12-18 NOTE — NURSING NOTE
"Alberto was calm and cooperative during assessment. He reports mild anxiety and depression and AH stating it will \"hurt me and my family.\" He denies current SI, SH, HI, and VH. He was social with peers in the milieu and attended group programming. He ate meals in the dining room but did decline breakfast this AM and stated he is trying to eat healthy and lose weight. He was medication compliant and requested nicorette gum as a PRN. He denies new concerns at this time.   "

## 2024-12-18 NOTE — SOCIAL WORK
This writer spoke with patient's sister Ginger regarding his MA benefits. Sister confirms patient receives $1600+ SSDI. She reports they received notification of his MA benefits in September and informed her his income was too high for MA benefits. This writer discussed reapplying for MA benefits as we wait for [facility] funding for ACT services. Ginger will meet with patient and this writer on 1/6@1030 to complete a MA application.   [place] requested an in person visit on 12/26@1100.

## 2024-12-18 NOTE — PLAN OF CARE
Problem: Alteration in Thoughts and Perception  Goal: Refrain from acting on delusional thinking/internal stimuli  Description: Interventions:  - Monitor patient closely, per order   - Utilize least restrictive measures   - Set reasonable limits, give positive feedback for acceptable   - Administer medications as ordered and monitor of potential side effects  Outcome: Progressing  Goal: Agree to be compliant with medication regime, as prescribed and report medication side effects  Description: Interventions:  - Offer appropriate PRN medication and supervise ingestion; conduct AIMS, as needed   Outcome: Progressing  Goal: Attend and participate in unit activities, including therapeutic, recreational, and educational groups  Description: Interventions:  -Encourage Visitation and family involvement in care  Outcome: Progressing  Goal: Recognize dysfunctional thoughts, communicate reality-based thoughts at the time of discharge  Description: Interventions:  - Provide medication and psycho-education to assist patient in compliance and developing insight into his/her illness   Outcome: Progressing  Goal: Complete daily ADLs, including personal hygiene independently, as able  Description: Interventions:  - Observe, teach, and assist patient with ADLS  - Monitor and promote a balance of rest/activity, with adequate nutrition and elimination   Outcome: Progressing     Problem: Ineffective Coping  Goal: Identifies ineffective coping skills  Outcome: Progressing  Goal: Identifies healthy coping skills  Outcome: Progressing  Goal: Demonstrates healthy coping skills  Outcome: Progressing  Goal: Participates in unit activities  Description: Interventions:  - Provide therapeutic environment   - Provide required programming   - Redirect inappropriate behaviors   Outcome: Progressing     Problem: Depression  Goal: Treatment Goal: Demonstrate behavioral control of depressive symptoms, verbalize feelings of improved mood/affect, and  adopt new coping skills prior to discharge  Outcome: Progressing  Goal: Refrain from isolation  Description: Interventions:  - Develop a trusting relationship   - Encourage socialization   Outcome: Progressing  Goal: Refrain from self-neglect  Outcome: Progressing     Problem: Anxiety  Goal: Anxiety is at manageable level  Description: Interventions:  - Assess and monitor patient's anxiety level.   - Monitor for signs and symptoms (heart palpitations, chest pain, shortness of breath, headaches, nausea, feeling jumpy, restlessness, irritable, apprehensive).   - Collaborate with interdisciplinary team and initiate plan and interventions as ordered.  - [place] patient to unit/surroundings  - Explain treatment plan  - Encourage participation in care  - Encourage verbalization of concerns/fears  - Identify coping mechanisms  - Assist in developing anxiety-reducing skills  - Administer/offer alternative therapies  - Limit or eliminate stimulants  Outcome: Progressing     Problem: Alteration in Orientation  Goal: Interact with staff daily  Description: Interventions:  - Assess and re-assess patient's level of orientation  - Engage patient in 1 on 1 interactions, daily, for a minimum of 15 minutes   - Establish rapport/trust with patient   Outcome: Progressing     Problem: Alteration in Thoughts and Perception  Goal: Treatment Goal: Gain control of psychotic behaviors/thinking, reduce/eliminate presenting symptoms and demonstrate improved reality functioning upon discharge  Outcome: Not Progressing

## 2024-12-18 NOTE — ASSESSMENT & PLAN NOTE
Reviewed 12/18/24  Follows with medical  Continue famotidine 20 mg tablet BID per medical team   Continue glycopyrrolate 1 mg tablet BID AM and afternoon per medical team  Continue glycopyrrolate 2 mg tablet before bed per medical team  Continue pantoprazole EC 20 mg tablet every morning per medical team  This is a chronic condition, and is stable unless otherwise noted.

## 2024-12-19 PROCEDURE — 99232 SBSQ HOSP IP/OBS MODERATE 35: CPT | Performed by: PSYCHIATRY & NEUROLOGY

## 2024-12-19 RX ORDER — CEPHALEXIN 500 MG/1
500 CAPSULE ORAL EVERY 6 HOURS SCHEDULED
Status: COMPLETED | OUTPATIENT
Start: 2024-12-19 | End: 2024-12-26

## 2024-12-19 RX ADMIN — PROPRANOLOL HYDROCHLORIDE 10 MG: 10 TABLET ORAL at 09:12

## 2024-12-19 RX ADMIN — NICOTINE POLACRILEX 2 MG: 2 GUM, CHEWING ORAL at 09:12

## 2024-12-19 RX ADMIN — CEPHALEXIN 500 MG: 500 CAPSULE ORAL at 17:14

## 2024-12-19 RX ADMIN — DOCUSATE SODIUM 100 MG: 100 CAPSULE, LIQUID FILLED ORAL at 09:12

## 2024-12-19 RX ADMIN — ATROPINE SULFATE 1 DROP: 10 SOLUTION/ DROPS OPHTHALMIC at 21:11

## 2024-12-19 RX ADMIN — DOCUSATE SODIUM 100 MG: 100 CAPSULE, LIQUID FILLED ORAL at 17:14

## 2024-12-19 RX ADMIN — ESCITALOPRAM OXALATE 20 MG: 10 TABLET, FILM COATED ORAL at 09:12

## 2024-12-19 RX ADMIN — NICOTINE POLACRILEX 2 MG: 2 GUM, CHEWING ORAL at 13:24

## 2024-12-19 RX ADMIN — CEPHALEXIN 500 MG: 500 CAPSULE ORAL at 11:05

## 2024-12-19 RX ADMIN — LORATADINE 10 MG: 10 TABLET ORAL at 09:12

## 2024-12-19 RX ADMIN — SENNOSIDES AND DOCUSATE SODIUM 2 TABLET: 50; 8.6 TABLET ORAL at 21:11

## 2024-12-19 RX ADMIN — MELATONIN TAB 3 MG 9 MG: 3 TAB at 21:10

## 2024-12-19 RX ADMIN — GLYCOPYRROLATE 1 MG: 1 TABLET ORAL at 09:12

## 2024-12-19 RX ADMIN — NICOTINE POLACRILEX 2 MG: 2 GUM, CHEWING ORAL at 21:11

## 2024-12-19 RX ADMIN — PANTOPRAZOLE SODIUM 20 MG: 20 TABLET, DELAYED RELEASE ORAL at 09:12

## 2024-12-19 RX ADMIN — GLYCOPYRROLATE 2 MG: 1 TABLET ORAL at 21:11

## 2024-12-19 RX ADMIN — PROPRANOLOL HYDROCHLORIDE 10 MG: 10 TABLET ORAL at 21:11

## 2024-12-19 RX ADMIN — MULTIPLE VITAMINS W/ MINERALS TAB 1 TABLET: TAB ORAL at 09:12

## 2024-12-19 RX ADMIN — CEPHALEXIN 500 MG: 500 CAPSULE ORAL at 23:00

## 2024-12-19 RX ADMIN — ARIPIPRAZOLE 30 MG: 15 TABLET ORAL at 09:12

## 2024-12-19 RX ADMIN — CLOZAPINE 250 MG: 25 TABLET ORAL at 21:11

## 2024-12-19 RX ADMIN — HYDROCHLOROTHIAZIDE 12.5 MG: 12.5 TABLET ORAL at 09:12

## 2024-12-19 RX ADMIN — NICOTINE POLACRILEX 2 MG: 2 GUM, CHEWING ORAL at 17:14

## 2024-12-19 RX ADMIN — AMLODIPINE BESYLATE 5 MG: 5 TABLET ORAL at 09:12

## 2024-12-19 RX ADMIN — GLYCOPYRROLATE 1 MG: 1 TABLET ORAL at 13:23

## 2024-12-19 NOTE — PLAN OF CARE
Problem: Alteration in Thoughts and Perception  Goal: Treatment Goal: Gain control of psychotic behaviors/thinking, reduce/eliminate presenting symptoms and demonstrate improved reality functioning upon discharge  Outcome: Progressing  Goal: Refrain from acting on delusional thinking/internal stimuli  Description: Interventions:  - Monitor patient closely, per order   - Utilize least restrictive measures   - Set reasonable limits, give positive feedback for acceptable   - Administer medications as ordered and monitor of potential side effects  Outcome: Progressing  Goal: Agree to be compliant with medication regime, as prescribed and report medication side effects  Description: Interventions:  - Offer appropriate PRN medication and supervise ingestion; conduct AIMS, as needed   Outcome: Progressing  Goal: Attend and participate in unit activities, including therapeutic, recreational, and educational groups  Description: Interventions:  -Encourage Visitation and family involvement in care  Outcome: Progressing  Goal: Complete daily ADLs, including personal hygiene independently, as able  Description: Interventions:  - Observe, teach, and assist patient with ADLS  - Monitor and promote a balance of rest/activity, with adequate nutrition and elimination   Outcome: Progressing     Problem: Ineffective Coping  Goal: Demonstrates healthy coping skills  Outcome: Progressing  Goal: Participates in unit activities  Description: Interventions:  - Provide therapeutic environment   - Provide required programming   - Redirect inappropriate behaviors   Outcome: Progressing     Problem: Depression  Goal: Verbalize thoughts and feelings  Description: Interventions:  - Assess and re-assess patient's level of risk   - Engage patient in 1:1 interactions, daily, for a minimum of 15 minutes   - Encourage patient to express feelings, fears, frustrations, hopes   Outcome: Progressing  Goal: Refrain from harming self  Description:  Interventions:  - Monitor patient closely, per order   - Supervise medication ingestion, monitor effects and side effects   Outcome: Progressing  Goal: Refrain from isolation  Description: Interventions:  - Develop a trusting relationship   - Encourage socialization   Outcome: Progressing  Goal: Refrain from self-neglect  Outcome: Progressing     Problem: Anxiety  Goal: Anxiety is at manageable level  Description: Interventions:  - Assess and monitor patient's anxiety level.   - Monitor for signs and symptoms (heart palpitations, chest pain, shortness of breath, headaches, nausea, feeling jumpy, restlessness, irritable, apprehensive).   - Collaborate with interdisciplinary team and initiate plan and interventions as ordered.  - [place] patient to unit/surroundings  - Explain treatment plan  - Encourage participation in care  - Encourage verbalization of concerns/fears  - Identify coping mechanisms  - Assist in developing anxiety-reducing skills  - Administer/offer alternative therapies  - Limit or eliminate stimulants  Outcome: Progressing

## 2024-12-19 NOTE — ASSESSMENT & PLAN NOTE
Reviewed 12/19/24, on nicotine gum as as needed and encouraged smoking cessation  Continue to encourage cessation upon discharge

## 2024-12-19 NOTE — ASSESSMENT & PLAN NOTE
Reviewed 12/19/24  Follows with medical team  Continue senna-docusate sodium dose per medical team.

## 2024-12-19 NOTE — PROGRESS NOTES
Progress Note - Behavioral Health   Name: Alberto Berumen 29 y.o. male I MRN: [id number]  Unit/Bed#: [facility] 112-02 I Date of Admission: 3/29/2024   Date of Service: 12/19/2024 I Hospital Day: 265     Assessment & Plan  Schizoaffective disorder, bipolar type (HCC)  Reviewed on 12/19/2024.  No significant changes with same threatening voices, but trying to cope by working out, talking to peers, and using the punching bag.  CK level has been high and patient is encouraged to drink more fluids - trending down.  No behavioral concerns.    Continue medication as follows:  Clozapine 250 mg QHS for psychosis - increased 12/11/24  To consider further careful titration  CBC w/diff and CK every 2 weeks  CK was increased to 960 on 12/12 and oral hydration encouraged, CK improved to 905 on 12/13/24. Repeat on 12/17/24 decreased to 674.  Continue Abilify 30 mg daily for psychosis as he is resistant to single antipsychotic  Continue Escitalopram 20 mg daily for depression and anxiety  Continue Robinul 1 mg BID and 2 mg QHS for sialorrhea from Clozapine  Continue Atropine 1% solution 1 drop sublingually QHS for adverse effects of Clozapine  Continue Propanolol 10 mg BID for anxiety   Continue Melatonin 9 mg QHS for sleep  Continue Senna-Docusate sodium one 50 mg tablet QHS for constipation    Pt remains on dual antipsychotic Tx due to failure on monotherapy   .  S/p ECT treatments.  - ACT team referral was made for him living back with his aunt once MA funding comes through from the [facility]  Chronic idiopathic constipation  Reviewed 12/19/24  Follows with medical team  Continue senna-docusate sodium dose per medical team.   GERD (gastroesophageal reflux disease)  Reviewed 12/19/24  Follows with medical  Continue famotidine 20 mg tablet BID per medical team   Continue glycopyrrolate 1 mg tablet BID AM and afternoon per medical team  Continue glycopyrrolate 2 mg tablet before bed per medical team  Continue pantoprazole EC  20 mg tablet every morning per medical team  This is a chronic condition, and is stable unless otherwise noted.  Tobacco abuse  Reviewed 12/19/24, on nicotine gum as as needed and encouraged smoking cessation  Continue to encourage cessation upon discharge    Elevated hemoglobin A1c  Reviewed 12/19/24  Follows with medical team    Class 2 obesity in adult  Reviewed 12/19/24  Follows with medical team and given dietary counseling and need for exercise    Primary hypertension  Reviewed 12/19/24  Follows with medical team on hydrochlorothiazide and Norvasc    Psychiatry Progress Note [facility]    Progress towards goals: slow progression    Review of systems: denied    Psychiatric Diagnosis: Schizoaffective disorder, bipolar type  Tobacco use disorder    Assessment  Overall Status:  progressing, no significant changes  Certification Statement: The patient will continue to require additional inpatient hospital stay due to psychotic related symptoms and limited response to medications and ECT.     Medications: as above  Side effects from treatment: denied  Medication changes: as above  Medication education: Risks side effects benefits and precautions of medications discussed with patient and they did verbalize an understanding about risks for metabolic syndrome from being on neuroleptics and tardive dyskinesia etc.  All medications reviewed and I recommend that they be continued for symptom management  Understanding of medications: Risks side effects benefits and precautions of medications discussed with patient and they did verbalize an understanding about risks for metabolic syndrome from being on neuroleptics and tardive dyskinesia etc., and patient appeared to have understanding.  Justification for dual anti-psychotics: due to not responding to single antipsychotic    Non-pharmacological treatments  Continue with individual, group, milieu and occupational therapy using recovery principles and  "psycho-education about accepting illness and the need for treatment.  Behavioral health checks every 7 minutes    Safety  Safety and communication plan established to target dynamic risk factors discussed above.    Discharge Plan   To his Aunt with [facility] funding for ACT is received    Interval Progress: Per treatment team, patient has been cooperative with care, visible on the unit, social with peers. The patient is appropriate with staff and peers and utilizing exercise room as coping strategy.  Patient with ingrown hair on thigh and medicine team has been notified.  The patient did not require PRN medications yesterday. He does utilize Nicorette gum PRN.    Today, the patient is evaluated at bedside and he remained in bed. He notes tiredness today, does not think it is related to medications and notes some mornings are slower for him. He denies adverse effects of medication at time of evaluation. The patient endorses his mood is, \"okay,\" and he reports good appetite though did not eat breakfast - noting he often skips and does not feel appetite is decreased from prior.  The patient continues to experience AH of voices telling him they will kill him and his family and notes their occurrence and volume are the same as compared to yesterday.  Encouraged patient to continue to use his coping strategies to help manage his symptoms.  The patient denies VH at time of evaluation. He denies SI, HI, passive death wishes, or thoughts to harm self or others at time of evaluation.    Acceptance by patient: accepting of inpatient treatment  Hopefulness in recovery: to live with his Aunt in community  Involved in reintegration process: communicating with siblings and Aunt  Trusting in relationship with psychiatrist: trusting  Sleep: good  Appetite: good  Compliance with Medications: compliant  Group attendance: 7/9 groups attended  Significant events: none in past 24 hours    Mental Status Exam  Appearance: age appropriate, " "casually dressed, adequate grooming  Behavior: cooperative, calm, lying in bed, good eye contact  Speech: normal rate, normal volume, normal pitch, clear, coherent, not hyperverbal, not pressured  Mood:  \"okay\"  Affect: constricted, mood-congruent  Thought Process: organized, logical, coherent, goal directed, decreased rate of thoughts  Thought Content: some paranoia, negative thoughts, intrusive thoughts  Perceptual Disturbances: auditory hallucinations of voices telling him they will kill him and his family, denies visual hallucinations when asked, does not appear responding to internal stimuli  Risk Potential: Suicidal Ideations none, Homicidal Ideations none, and Potential for Aggression Not at present  Sensorium: alert and oriented to person, place, time and situation  Cognition: recent and remote memory grossly intact  Consciousness: alert and awake  Attention: attention span and concentration are age appropriate  Intellect: appears to be of average intelligence  Insight: intact  Judgement: intact  Motor Activity: no abnormal movements     Vitals  Temp:  [97.5 °F (36.4 °C)-97.8 °F (36.6 °C)] 97.5 °F (36.4 °C)  HR:  [81-97] 81  Resp:  [18] 18  BP: (121-131)/(60-83) 131/80  SpO2:  [100 %] 100 %  No intake or output data in the 24 hours ending 12/19/24 0758    Lab Results: All Labs For Current Hospital Admission Reviewed        Current Facility-Administered Medications   Medication Dose Route Frequency Provider Last Rate    acetaminophen  650 mg Oral Q4H PRN Jordan C Holter, DO      acetaminophen  650 mg Oral Q6H PRN HOLLI Lion      aluminum-magnesium hydroxide-simethicone  30 mL Oral Q4H PRN Jordan C Holter, DO      amLODIPine  5 mg Oral Daily HOLLI Lion      ARIPiprazole  30 mg Oral Daily Bora Rosario MD      Artificial Tears  1 drop Both Eyes Q3H PRN Jordan C Holter, DO      atropine  1 drop Sublingual GAEL Torres DO      haloperidol lactate  2.5 mg Intramuscular Q4H PRN Max 4/day " STEVE LionNP      And    LORazepam  1 mg Intramuscular Q4H PRN Max 4/day STEVE LionNP      And    benztropine  0.5 mg Intramuscular Q4H PRN Max 4/day HOLLI Lion      benztropine  1 mg Intramuscular Q4H PRN Max 6/day Ion FISCHER Holter, DO      haloperidol lactate  5 mg Intramuscular Q4H PRN Max 4/day HOLLI Lion      And    LORazepam  2 mg Intramuscular Q4H PRN Max 4/day HOLLI Lion      And    benztropine  1 mg Intramuscular Q4H PRN Max 4/day HOLLI Lion      benztropine  1 mg Oral Q4H PRN Max 6/day HOLLI Lion      benztropine  1 mg Oral Q4H PRN Max 6/day Ion FISCHER Holter, DO      bisacodyl  10 mg Rectal Daily PRN HOLLI Lion      calcium carbonate  500 mg Oral BID PRN HOLLI Monge      cloZAPine  250 mg Oral HS Bora Rosario MD      hydrOXYzine HCL  50 mg Oral Q6H PRN Max 4/day Ion Dupontter, DO      Or    diphenhydrAMINE  50 mg Intramuscular Q6H PRN Jordan C Holter, DO      hydrOXYzine HCL  50 mg Oral Q6H PRN Max 4/day HOLLI Lion      Or    diphenhydrAMINE  50 mg Intramuscular Q6H PRN HOLLI Lion      diphenhydrAMINE-zinc acetate   Topical BID PRN HOLLI Lion      docusate sodium  100 mg Oral BID Jourdan Dickens, DO      escitalopram  20 mg Oral Daily HOLLI Lion      famotidine  20 mg Oral BID PRN HOLLI Monge      fluticasone  1 spray Each Nare Daily PRN HOLLI Monge      glycopyrrolate  1 mg Oral BID (AM & Afternoon) Bora Rosario MD      glycopyrrolate  2 mg Oral HS Bora Rosario MD      haloperidol  1 mg Oral Q6H PRN HOLLI Lion      haloperidol  2.5 mg Oral Q4H PRN Max 4/day HOLLI Lion      haloperidol  5 mg Oral Q4H PRN Max 4/day HOLLI Lion      hydroCHLOROthiazide  12.5 mg Oral Daily HOLLI Galvan      hydrocortisone   Topical 4x Daily PRN HOLLI Lion      hydrOXYzine HCL  100 mg Oral Q6H PRN Max 4/day Jordan C Holter, DO      Or     LORazepam  2 mg Intramuscular Q6H PRN [facility] Holter, DO      hydrOXYzine HCL  100 mg Oral Q6H PRN Max 4/day HOLLI Lion      Or    LORazepam  2 mg Intramuscular Q6H PRN HOLLI Lion      hydrOXYzine HCL  25 mg Oral Q6H PRN Max 4/day [facility] Holter, DO      ibuprofen  600 mg Oral Q8H PRN HOLLI Lion      influenza vaccine  0.5 mL Intramuscular Once Bora Rosario MD      loratadine  10 mg Oral Daily Brina Guillen MD      magnesium hydroxide  30 mL Oral Once Jourdan Dickens, DO      melatonin  3 mg Oral HS PRN Bora Rosario MD      melatonin  9 mg Oral HS Bora Rosario MD      methocarbamol  500 mg Oral Q6H PRN HOLLI Lion      multivitamin-minerals  1 tablet Oral Daily Eileen MiyaHOLLI Jimenez      nicotine polacrilex  2 mg Oral Q4H PRN Bora Rosario MD      OLANZapine  5 mg Oral Q4H PRN Max 3/day [facility] Holter, DO      Or    OLANZapine  2.5 mg Intramuscular Q4H PRN Max 3/day [facility] Holter, DO      OLANZapine  5 mg Oral Q3H PRN Max 3/day [facility] Holter, DO      Or    OLANZapine  5 mg Intramuscular Q3H PRN Max 3/day [facility] Holter, DO      OLANZapine  2.5 mg Oral Q4H PRN Max 6/day [facility] Holter, DO      ondansetron  4 mg Oral Q6H PRN HOLLI Lion      [facility]antoprazole  [facility]      polyethylene glycol  17 g Oral Daily PRN HOLLI Ghotra      polyethylene glycol  17 g Oral Daily Jourdan Dickens, DO      propranolol  10 mg Oral Q12H KAYLIE HOLLI Lion      senna-docusate sodium  1 tablet Oral Daily PRN [facility] Holter, DO      senna-docusate sodium  2 tablet Oral HS Jourdan Dickens, DO      traZODone  50 mg Oral HS PRN HOLLI Lion      white petrolatum-mineral oil   Topical TID PRN HOLLI Lion         Counseling / Coordination of Care: Total floor / unit time spent today 15 minutes. Greater than 50% of total time was spent with the patient and / or family counseling and / or somewhat receptive to supportive listening and  teaching positive coping skills to deal with symptom mangement.     Patient's Rights, confidentiality and exceptions to confidentiality, use of automated medical record, Behavioral Health Services staff access to medical record, and consent to treatment reviewed.    This note has been dictated and hence there may be problems with punctuation, spelling and formatting and if anyone has any concerns please address them to Dr. Mora.  This note is not shared with patient due to potential for making patient's condition worse by knowing the content of the note.    Hollie Mora,   Psychiatry Resident, PGY-IV

## 2024-12-19 NOTE — QUICK NOTE
Called by nursing patient appears to have a left groin infected hair follicle.  Started on Keflex 500 mg p.o. every 6 hours x 7 days.  Will continue to follow.

## 2024-12-19 NOTE — ASSESSMENT & PLAN NOTE
Reviewed on 12/19/2024.  No significant changes with same threatening voices, but trying to cope by working out, talking to peers, and using the punching bag.  CK level has been high and patient is encouraged to drink more fluids - trending down.  No behavioral concerns.    Continue medication as follows:  Clozapine 250 mg QHS for psychosis - increased 12/11/24  To consider further careful titration  CBC w/diff and CK every 2 weeks  CK was increased to 960 on 12/12 and oral hydration encouraged, CK improved to 905 on 12/13/24. Repeat on 12/17/24 decreased to 674.  Continue Abilify 30 mg daily for psychosis as he is resistant to single antipsychotic  Continue Escitalopram 20 mg daily for depression and anxiety  Continue Robinul 1 mg BID and 2 mg QHS for sialorrhea from Clozapine  Continue Atropine 1% solution 1 drop sublingually QHS for adverse effects of Clozapine  Continue Propanolol 10 mg BID for anxiety   Continue Melatonin 9 mg QHS for sleep  Continue Senna-Docusate sodium one 50 mg tablet QHS for constipation    Pt remains on dual antipsychotic Tx due to failure on monotherapy   .  S/p ECT treatments.  - ACT team referral was made for him living back with his aunt once MA funding comes through from the [facility]

## 2024-12-19 NOTE — ASSESSMENT & PLAN NOTE
Reviewed 12/19/24  Follows with medical  Continue famotidine 20 mg tablet BID per medical team   Continue glycopyrrolate 1 mg tablet BID AM and afternoon per medical team  Continue glycopyrrolate 2 mg tablet before bed per medical team  Continue pantoprazole EC 20 mg tablet every morning per medical team  This is a chronic condition, and is stable unless otherwise noted.

## 2024-12-19 NOTE — NURSING NOTE
Pt is visible on the unit and social with select peers. Consumed 100% of dinner. Took medications without incidence. Pt is pleasant and cooperative. Attended 9/11 groups. Reports depression and anxiety with the threatening AH but he jessica using the exercise room and talking to his peers. Denies SI/HI/VH. No behavioral issues. Pt offers no new complaints.

## 2024-12-19 NOTE — NURSING NOTE
Pt in bed asleep, observed eyes closed, and chest movement noted. 15 minute safety checks maintained. No unmet needs at this time. Will continue to monitor patient needs, sleep pattern, and behaviors.     0624: Patient has slept all night, 7+ hours of uninterrupted sleep

## 2024-12-19 NOTE — NURSING NOTE
"Alberto was calm and cooperative during assessment. He denies current SI, SH, HI, and VH. He reports stable AH of \"a voice threatening to harm him and his family.\" He reports mild anxiety and depression about the voices. He was social with peers in the milieu and attended group programming. He attended meals in the dining room. He was medication compliant and requested PRN nicorette gum. Medical saw him for a complaint of an ingrown hair and was started on Keflex and recommended to sleep with an ABD pad applied to the region at . He denies new concerns at this time.   "

## 2024-12-20 LAB — CK SERPL-CCNC: 448 U/L (ref 39–308)

## 2024-12-20 PROCEDURE — 82550 ASSAY OF CK (CPK): CPT | Performed by: PSYCHIATRY & NEUROLOGY

## 2024-12-20 PROCEDURE — 99232 SBSQ HOSP IP/OBS MODERATE 35: CPT | Performed by: PSYCHIATRY & NEUROLOGY

## 2024-12-20 RX ADMIN — GLYCOPYRROLATE 1 MG: 1 TABLET ORAL at 09:04

## 2024-12-20 RX ADMIN — CLOZAPINE 250 MG: 25 TABLET ORAL at 21:26

## 2024-12-20 RX ADMIN — PANTOPRAZOLE SODIUM 20 MG: 20 TABLET, DELAYED RELEASE ORAL at 09:04

## 2024-12-20 RX ADMIN — ARIPIPRAZOLE 30 MG: 15 TABLET ORAL at 09:04

## 2024-12-20 RX ADMIN — DOCUSATE SODIUM 100 MG: 100 CAPSULE, LIQUID FILLED ORAL at 09:04

## 2024-12-20 RX ADMIN — ATROPINE SULFATE 1 DROP: 10 SOLUTION/ DROPS OPHTHALMIC at 21:26

## 2024-12-20 RX ADMIN — NICOTINE POLACRILEX 2 MG: 2 GUM, CHEWING ORAL at 09:43

## 2024-12-20 RX ADMIN — MULTIPLE VITAMINS W/ MINERALS TAB 1 TABLET: TAB ORAL at 09:04

## 2024-12-20 RX ADMIN — CEPHALEXIN 500 MG: 500 CAPSULE ORAL at 12:36

## 2024-12-20 RX ADMIN — DOCUSATE SODIUM 100 MG: 100 CAPSULE, LIQUID FILLED ORAL at 17:21

## 2024-12-20 RX ADMIN — HYDROCHLOROTHIAZIDE 12.5 MG: 12.5 TABLET ORAL at 09:04

## 2024-12-20 RX ADMIN — GLYCOPYRROLATE 1 MG: 1 TABLET ORAL at 14:30

## 2024-12-20 RX ADMIN — PROPRANOLOL HYDROCHLORIDE 10 MG: 10 TABLET ORAL at 21:25

## 2024-12-20 RX ADMIN — CEPHALEXIN 500 MG: 500 CAPSULE ORAL at 23:12

## 2024-12-20 RX ADMIN — GLYCOPYRROLATE 2 MG: 1 TABLET ORAL at 21:25

## 2024-12-20 RX ADMIN — AMLODIPINE BESYLATE 5 MG: 5 TABLET ORAL at 09:03

## 2024-12-20 RX ADMIN — SENNOSIDES AND DOCUSATE SODIUM 2 TABLET: 50; 8.6 TABLET ORAL at 21:26

## 2024-12-20 RX ADMIN — ESCITALOPRAM OXALATE 20 MG: 10 TABLET, FILM COATED ORAL at 09:05

## 2024-12-20 RX ADMIN — POLYETHYLENE GLYCOL 3350 17 G: 17 POWDER, FOR SOLUTION ORAL at 09:05

## 2024-12-20 RX ADMIN — CEPHALEXIN 500 MG: 500 CAPSULE ORAL at 17:24

## 2024-12-20 RX ADMIN — MELATONIN TAB 3 MG 9 MG: 3 TAB at 21:25

## 2024-12-20 RX ADMIN — NICOTINE POLACRILEX 2 MG: 2 GUM, CHEWING ORAL at 21:26

## 2024-12-20 RX ADMIN — LORATADINE 10 MG: 10 TABLET ORAL at 09:05

## 2024-12-20 RX ADMIN — NICOTINE POLACRILEX 2 MG: 2 GUM, CHEWING ORAL at 17:21

## 2024-12-20 RX ADMIN — PROPRANOLOL HYDROCHLORIDE 10 MG: 10 TABLET ORAL at 09:04

## 2024-12-20 RX ADMIN — CEPHALEXIN 500 MG: 500 CAPSULE ORAL at 06:17

## 2024-12-20 NOTE — PROGRESS NOTES
12/20/24 0914   Team Meeting   Meeting Type Daily Rounds   Team Members Present   Team Members Present Physician;Nurse;;Other (Discipline and Name)   Physician Team Member Thomas, Holter,   Nursing Team Member Claudia   Social Work Team Member Jose J ORTEGA   Other (Discipline and Name) Jeremias Grace MS   Patient/Family Present   Patient Present No   Patient's Family Present No     Groups Participation  9/11.   Patient's compliant with medications. He's engaged in his treatment. ACT referral pending [facility] funding for services.

## 2024-12-20 NOTE — ASSESSMENT & PLAN NOTE
Reviewed 12/20/24, on nicotine gum as as needed and encouraged smoking cessation  Continue to encourage cessation upon discharge

## 2024-12-20 NOTE — ASSESSMENT & PLAN NOTE
Reviewed on 12//2024.  No significant chnages  Continue medication as follows:  Clozapine 250 mg QHS for psychosis - increased 12/11/24  To consider further careful titration  CBC w/diff and CK every 2 weeks  CK was increased to 960 on 12/12 and oral hydration encouraged, CK improved to 905 on 12/13/24. Repeat on 12/17/24 decreased to 674.and sofia repeat today  Continue Abilify 30 mg daily for psychosis as he is resistant to single antipsychotic  Continue Escitalopram 20 mg daily for depression and anxiety  Continue Robinul 1 mg BID and 2 mg QHS for sialorrhea from Clozapine  Continue Atropine 1% solution 1 drop sublingually QHS for adverse effects of Clozapine  Continue Propanolol 10 mg BID for anxiety   Continue Melatonin 9 mg QHS for sleep  Continue Senna-Docusate sodium one 50 mg tablet QHS for constipation    Pt remains on dual antipsychotic Tx due to failure on monotherapy   .  S/p ECT treatments.  - ACT team referral was made for him living back with his aunt once MA funding comes through from the [facility]

## 2024-12-20 NOTE — ASSESSMENT & PLAN NOTE
Reviewed 12/20/24  Follows with medical  Continue famotidine 20 mg tablet BID per medical team   Continue glycopyrrolate 1 mg tablet BID AM and afternoon per medical team  Continue glycopyrrolate 2 mg tablet before bed per medical team  Continue pantoprazole EC 20 mg tablet every morning per medical team  This is a chronic condition, and is stable unless otherwise noted.

## 2024-12-20 NOTE — ASSESSMENT & PLAN NOTE
Reviewed 12/20/24  Follows with medical team  Continue senna-docusate sodium dose per medical team.

## 2024-12-20 NOTE — SOCIAL WORK
This writer met with patient for an individual session. The patient reported that he is doing better overall but continues to experience symptoms of hallucinations. This writer discussed coping skills that the patient can utilize to manage these symptoms. The session also emphasized the importance of the patient focusing on his recovery and adhering to the rules of the unit.   Additionally, this writer and the patient discussed plans for the patient, this writer and the patient's sister (Ginger) to complete the reapplication for medical assistance in January.   This writer will continue working with the patient n coping strategies to manage hallucinations.

## 2024-12-20 NOTE — NURSING NOTE
"Alberto is calm and cooperative this shift.  Flat affect noted.  He currently denies SI/HI and VH.  Patient continues to report AH of \"a voice threatening to hurt him and his family\".  He does endorse mild depression and anxiety regarding the voice.  Alberto is medication compliant.  He did not eat breakfast but did eat 100% of lunch.  Social with select peers.  Compliant with having his blood work done which was collected and sent to the lab.  Attending most groups.  Continue with q 15 min checks.  "

## 2024-12-20 NOTE — PLAN OF CARE
Problem: Alteration in Thoughts and Perception  Goal: Treatment Goal: Gain control of psychotic behaviors/thinking, reduce/eliminate presenting symptoms and demonstrate improved reality functioning upon discharge  Outcome: Progressing  Goal: Refrain from acting on delusional thinking/internal stimuli  Description: Interventions:  - Monitor patient closely, per order   - Utilize least restrictive measures   - Set reasonable limits, give positive feedback for acceptable   - Administer medications as ordered and monitor of potential side effects  Outcome: Progressing  Goal: Agree to be compliant with medication regime, as prescribed and report medication side effects  Description: Interventions:  - Offer appropriate PRN medication and supervise ingestion; conduct AIMS, as needed   Outcome: Progressing  Goal: Attend and participate in unit activities, including therapeutic, recreational, and educational groups  Description: Interventions:  -Encourage Visitation and family involvement in care  Outcome: Progressing  Goal: Complete daily ADLs, including personal hygiene independently, as able  Description: Interventions:  - Observe, teach, and assist patient with ADLS  - Monitor and promote a balance of rest/activity, with adequate nutrition and elimination   Outcome: Progressing     Problem: Ineffective Coping  Goal: Demonstrates healthy coping skills  Outcome: Progressing  Goal: Participates in unit activities  Description: Interventions:  - Provide therapeutic environment   - Provide required programming   - Redirect inappropriate behaviors   Outcome: Progressing     Problem: Depression  Goal: Refrain from isolation  Description: Interventions:  - Develop a trusting relationship   - Encourage socialization   Outcome: Progressing  Goal: Refrain from self-neglect  Outcome: Progressing     Problem: Anxiety  Goal: Anxiety is at manageable level  Description: Interventions:  - Assess and monitor patient's anxiety level.    - Monitor for signs and symptoms (heart palpitations, chest pain, shortness of breath, headaches, nausea, feeling jumpy, restlessness, irritable, apprehensive).   - Collaborate with interdisciplinary team and initiate plan and interventions as ordered.  - [place] patient to unit/surroundings  - Explain treatment plan  - Encourage participation in care  - Encourage verbalization of concerns/fears  - Identify coping mechanisms  - Assist in developing anxiety-reducing skills  - Administer/offer alternative therapies  - Limit or eliminate stimulants  Outcome: Progressing

## 2024-12-20 NOTE — PROGRESS NOTES
Progress Note - Behavioral Health   Name: Alberto Berumen 29 y.o. male I MRN: [id number]  Unit/Bed#: [facility] 112-02 I Date of Admission: 3/29/2024   Date of Service: 12/20/2024 I Hospital Day: 266     Assessment & Plan  Schizoaffective disorder, bipolar type (HCC)  Reviewed on 12//2024.  No significant chnages  Continue medication as follows:  Clozapine 250 mg QHS for psychosis - increased 12/11/24  To consider further careful titration  CBC w/diff and CK every 2 weeks  CK was increased to 960 on 12/12 and oral hydration encouraged, CK improved to 905 on 12/13/24. Repeat on 12/17/24 decreased to 674.and sofia repeat today  Continue Abilify 30 mg daily for psychosis as he is resistant to single antipsychotic  Continue Escitalopram 20 mg daily for depression and anxiety  Continue Robinul 1 mg BID and 2 mg QHS for sialorrhea from Clozapine  Continue Atropine 1% solution 1 drop sublingually QHS for adverse effects of Clozapine  Continue Propanolol 10 mg BID for anxiety   Continue Melatonin 9 mg QHS for sleep  Continue Senna-Docusate sodium one 50 mg tablet QHS for constipation    Pt remains on dual antipsychotic Tx due to failure on monotherapy   .  S/p ECT treatments.  - ACT team referral was made for him living back with his aunt once MA funding comes through from the [facility]  Chronic idiopathic constipation  Reviewed 12/20/24  Follows with medical team  Continue senna-docusate sodium dose per medical team.   GERD (gastroesophageal reflux disease)  Reviewed 12/20/24  Follows with medical  Continue famotidine 20 mg tablet BID per medical team   Continue glycopyrrolate 1 mg tablet BID AM and afternoon per medical team  Continue glycopyrrolate 2 mg tablet before bed per medical team  Continue pantoprazole EC 20 mg tablet every morning per medical team  This is a chronic condition, and is stable unless otherwise noted.  Tobacco abuse  Reviewed 12/20/24, on nicotine gum as as needed and encouraged smoking  cessation  Continue to encourage cessation upon discharge    Elevated hemoglobin A1c  Reviewed 12/20/24  Follows with medical team    Class 2 obesity in adult  Reviewed 12/20/24  Follows with medical team and given dietary counseling and need for exercise    Primary hypertension  Reviewed 12/20/24  Follows with medical team on hydrochlorothiazide and Norvasc      Patient Active Problem List   Diagnosis    GERD (gastroesophageal reflux disease)    Schizoaffective disorder, bipolar type (HCC)    Tobacco abuse    T wave inversion in EKG    Syringoma    Chronic idiopathic constipation    Vitamin B 12 deficiency    Vitamin D deficiency    Confluent and reticulate papillomatosis    Class 2 obesity in adult    Primary hypertension    Elevated hemoglobin A1c    Bilateral lower extremity edema     Review of systems: CK to be repeated today.  He was started on Keflex for ingrown hair in his groin by medical otherwise unremarkable   assessment  Overall Status: No significant changes  certification Statement: The patient will continue to require additional inpatient hospital stay due to ongoing voices that are threatening to mixing lack of response to medications and ECT until eligible for an ACT team to live with his aunt.     Medications:  propranolol 10 mg every 12 hours  for anxiety, Abilify 30 mg mg at bedtime Lexapro 20 mg once a day,  and senna once a day day for constipation clozapine 250 mg at bedtime, Robinul 1 mg twice a day and 2 mg at bedtime for drooling from Clozapine, Colace 100 mg twice a day with MiraLAX daily for constipation from Clozapine  All medications reviewed d  side effects from treatment: None reported  Medication changes   Clozapine increased to 250 mg on 12/11/2024 from 225 mg  medication education  Risks side effects benefits and precautions of medications discussed with patient and he did verbalize an understanding about risks for metabolic syndrome from being on neuroleptics and risk for tardive  dyskinesia etc.   Understanding of medications: Has some understanding  Justification for dual anti-psychotics: due to not responding to single antipsychotics    Non-pharmacological treatments  Continue with individual, group, milieu and occupational therapy using recovery principles and psycho-education about accepting illness and the need for treatment.  Waiting for [facility] to fund an ACT team as he lost his MA benefits and to live with aunt in the [facility]  Maintenance ECTs  completed several months ago  Refuses to see a dietician and agrees to do more exercises as he is about 100 lbs overweight   Prolactin level high so Risperdal replaced with Abilify which he has tolerated well so far with prolactin level back to normal  Counseled to take the stool softeners and laxatives regularly  Dietary counseling given to watch diet and to exercise    Safety  Safety and communication plan established to target dynamic risk factors discussed above.    Discharge Plan back to his aunt when [facility] starts funding for ACT program as his medical assistance benefits are no longer available since he is on Medicare and not eligible for medical assistance but his sister is going to help him reapply for MA benefits per request from the [facility]  Interval Progress   Still hearing same threatening voices as usual fo rover 3 years, still uses punching bag, waiting for labs to recheck his CK. Trying to drink fluids per him. Not agressive or agitated still with some sadness over his chronic sxs but not suicidal, interacts with some peers, attending most of the groups.      Reporting same threatening voices to kill him is not family when he gets out which has been ongoing for over 3 years and he is trying to accept them and using coping skills like plan using the punching bag and talking with friends and trying to distract himself now realizes he has learned to live with.  Trying to drink fluids to bring down his CK.  Not aggressive or agitated  or threatening or self-abusive with no need for behavioral PRNs in the last 24 hours.  Does feel some sadness over his chronic condition but not to the point of suicide.  Paces back and forth interacts with peers attending groups and talking with family and not aggressive or agitated or threatening with no need for behavioral PRNs in the last 24 hours, sister set to visit on 12/26/24    acceptance by patient: Accepting  Hopefulness in recovery: Living back with his aunt  Involved in reintegration process: Talking to siblings and aunt and meeting with them on [facility]  trusting in relationship with psychiatrist: Trusting  Sleep: Good  Appetite: Good  Compliance with Medications: Good  Group attendance: Most of the groups 9/11  Significant events and progress towards goals: Status quo    Mental Status Exam  Appearance: age appropriate, improved grooming, looks older than stated age, overweight, with hair in dreadlocks casually dressed with a clean shaven face, fairly groomed with good eye contact found pacing the hallway with good eye contact well-groomed found laying on bed under the covers pleasant when approached  behavior: Pleasant friendly cooperative less anxious  speech: normal rate, normal volume, normal pitch  Mood: dysphoric, anxious, continues to report feeling very good because of the voices that are never going away  affect: constricted, inappropriate, mood-congruent, with no good mood reactivity .    thought Process: organized, logical, coherent, goal directed, linear, decreased rate of thoughts, slowing of thoughts, negative thinking, impaired abstract reasoning, concrete  Thought Content: paranoid ideation, some paranoia, grandiose ideas, intrusive thoughts, preoccupied, chronic, continues to report paranoia about people threatening to kill him and his family because of the voices.  He believes ECT has helped so that he is not much in his head.  No other delusions elicited.  No current suicidal or homicidal  thoughts and no plans verbalized but today reports having passive death wishes because of voices with no plans and able to CFS and it has not changed yet  .  No phobias obsessions compulsions or distorted body perceptions reported.   Perceptual Disturbances: Still reporting same threatening voices as before not commanding   Hx Risk Factors: chronic psychiatric problems, chronic anxiety symptoms, chronic psychotic symptoms,    Sensorium: Oriented x 3 spheres and situation  Attention and concentration span: Intact  Cognition: recent and remote memory grossly intact  Consciousness: alert and awake  Intellect: appears to be of average intelligence  Insight: intact  Judgement: intact  Motor Activity: no abnormal movements     Vitals  Temp:  [97.5 °F (36.4 °C)-98 °F (36.7 °C)] 98 °F (36.7 °C)  HR:  [81-99] 99  Resp:  [18] 18  BP: (131-140)/(60-80) 140/63  SpO2:  [98 %-100 %] 99 %  No intake or output data in the 24 hours ending 12/20/24 8601        Lab Results: All labs reviewed and prolactin level came down to normal on 8/1/2024, clozapine level 820 on 9/5/24  Current Facility-Administered Medications   Medication Dose Route Frequency Provider Last Rate    acetaminophen  650 mg Oral Q4H PRN Jordan C Holter, DO      acetaminophen  650 mg Oral Q6H PRN HOLLI Lion      aluminum-magnesium hydroxide-simethicone  30 mL Oral Q4H PRN Jordan C Holter, DO      amLODIPine  5 mg Oral Daily HOLLI Lion      ARIPiprazole  30 mg Oral Daily Bora Rosario MD      Artificial Tears  1 drop Both Eyes Q3H PRN Jordan C Holter, DO      atropine  1 drop Sublingual GAEL Torres DO      haloperidol lactate  2.5 mg Intramuscular Q4H PRN Max 4/day HOLLI Lion      And    LORazepam  1 mg Intramuscular Q4H PRN Max 4/day HOLLI Loin      And    benztropine  0.5 mg Intramuscular Q4H PRN Max 4/day HOLLI Lion      benztropine  1 mg Intramuscular Q4H PRN Max 6/day Jordan C Holter, DO      haloperidol lactate  5  mg Intramuscular Q4H PRN Max 4/day HOLLI Lion      And    LORazepam  2 mg Intramuscular Q4H PRN Max 4/day HOLLI Lion      And    benztropine  1 mg Intramuscular Q4H PRN Max 4/day Eveline Hunt, CRNP      benztropine  1 mg Oral Q4H PRN Max 6/day Eveline Gilbert, CRNP      benztropine  1 mg Oral Q4H PRN Max 6/day Ion Dupontter, DO      bisacodyl  10 mg Rectal Daily PRN Eveline Hunt, STEVENP      calcium carbonate  500 mg Oral BID PRN Eileenaddie Jensen, STEVENP      cephalexin  500 mg Oral Q6H KAYLIE Pia Mantilla, CRJENNIE      cloZAPine  250 mg Oral HS Bora Rosario MD      hydrOXYzine HCL  50 mg Oral Q6H PRN Max 4/day Jordan C Holter, DO      Or    diphenhydrAMINE  50 mg Intramuscular Q6H PRN Jordan C Holter, DO      hydrOXYzine HCL  50 mg Oral Q6H PRN Max 4/day HOLLI Lion      Or    diphenhydrAMINE  50 mg Intramuscular Q6H PRN HOLLI Lion      diphenhydrAMINE-zinc acetate   Topical BID PRN HOLLI Lion      docusate sodium  100 mg Oral BID Jourdan Dickens,       escitalopram  20 mg Oral Daily HOLLI Lion      famotidine  20 mg Oral BID PRN EileenHOLLI Cummins      fluticasone  1 spray Each Nare Daily PRN EileenHOLLI Cummins      glycopyrrolate  1 mg Oral BID (AM & Afternoon) Bora Rosario MD      glycopyrrolate  2 mg Oral HS Bora Rosario MD      haloperidol  1 mg Oral Q6H PRN HOLLI Lion      haloperidol  2.5 mg Oral Q4H PRN Max 4/day HOLLI Lion      haloperidol  5 mg Oral Q4H PRN Max 4/day HOLLI Lion      hydroCHLOROthiazide  12.5 mg Oral Daily Pia Mantilla, CRNP      hydrocortisone   Topical 4x Daily PRN Eveline Hunt, STEVENP      hydrOXYzine HCL  100 mg Oral Q6H PRN Max 4/day Jordan C Holter, DO      Or    LORazepam  2 mg Intramuscular Q6H PRN Jordan C Holter, DO      hydrOXYzine HCL  100 mg Oral Q6H PRN Max 4/day HOLLI Lion      Or    LORazepam  2 mg Intramuscular Q6H PRN HOLLI Lion      hydrOXYzine HCL   25 mg Oral Q6H PRN Max 4/day [facility] Holter, DO      ibuprofen  600 mg Oral Q8H PRN HOLLI Lion      influenza vaccine  0.5 mL Intramuscular Once Bora Rosario MD      loratadine  10 mg Oral Daily Brina Guillen MD      magnesium hydroxide  30 mL Oral Once Jourdan Dickens, DO      melatonin  3 mg Oral HS PRN Bora Rosario MD      melatonin  9 mg Oral HS Bora Rosario MD      methocarbamol  500 mg Oral Q6H PRN HOLLI Lion      multivitamin-minerals  1 tablet Oral Daily Eileen Gonzalezmiryamjaylen, HOLLI      nicotine polacrilex  2 mg Oral Q4H PRN Bora Rosario MD      OLANZapine  5 mg Oral Q4H PRN Max 3/day [facility] Holter, DO      Or    OLANZapine  2.5 mg Intramuscular Q4H PRN Max 3/day [facility] Holter, DO      OLANZapine  5 mg Oral Q3H PRN Max 3/day [facility] Holter, DO      Or    OLANZapine  5 mg Intramuscular Q3H PRN Max 3/day [facility] Holter, DO      OLANZapine  2.5 mg Oral Q4H PRN Max 6/day [facility] Holter, DO      ondansetron  4 mg Oral Q6H PRN HOLIL Lion      [facility]oprazole  [facility]      polyethylene glycol  17 g Oral Daily PRN HOLLI Ghotra      polyethylene glycol  17 g Oral Daily Jourdan Dickens, DO      propranolol  10 mg Oral Q12H KAYLIE HOLLI Lion      senna-docusate sodium  1 tablet Oral Daily PRN [facility] Holter, DO      senna-docusate sodium  2 tablet Oral HS Jourdan Dickens, DO      traZODone  50 mg Oral HS PRN HOLLI Lion      white petrolatum-mineral oil   Topical TID PRN HOLLI Lion         Counseling / Coordination of Care: Total floor / unit time spent today 15 minutes. Greater than 50% of total time was spent with the patient and / or family counseling and / or somewhat receptive to supportive listening and teaching positive coping skills to deal with symptom mangement.     Patient's Rights, confidentiality and exceptions to confidentiality, use of automated medical record, Behavioral Health Services staff access to medical  record, and consent to treatment reviewed.    This note has been dictated and hence there may be problems with punctuation, spelling and formatting and if anyone has any concerns please address them to Dr. Rosario   This note is not shared with patient due to potential for making patient's condition worse by knowing the content of the note.  Ms. Reese occasionally medications occasionally but that she is not happy

## 2024-12-21 PROCEDURE — 99232 SBSQ HOSP IP/OBS MODERATE 35: CPT | Performed by: PSYCHIATRY & NEUROLOGY

## 2024-12-21 RX ADMIN — DOCUSATE SODIUM 100 MG: 100 CAPSULE, LIQUID FILLED ORAL at 17:08

## 2024-12-21 RX ADMIN — ARIPIPRAZOLE 30 MG: 15 TABLET ORAL at 08:31

## 2024-12-21 RX ADMIN — PANTOPRAZOLE SODIUM 20 MG: 20 TABLET, DELAYED RELEASE ORAL at 08:31

## 2024-12-21 RX ADMIN — GLYCOPYRROLATE 1 MG: 1 TABLET ORAL at 13:15

## 2024-12-21 RX ADMIN — CEPHALEXIN 500 MG: 500 CAPSULE ORAL at 13:15

## 2024-12-21 RX ADMIN — CLOZAPINE 250 MG: 25 TABLET ORAL at 21:08

## 2024-12-21 RX ADMIN — GLYCOPYRROLATE 1 MG: 1 TABLET ORAL at 08:32

## 2024-12-21 RX ADMIN — NICOTINE POLACRILEX 2 MG: 2 GUM, CHEWING ORAL at 08:32

## 2024-12-21 RX ADMIN — GLYCOPYRROLATE 2 MG: 1 TABLET ORAL at 21:08

## 2024-12-21 RX ADMIN — CEPHALEXIN 500 MG: 500 CAPSULE ORAL at 17:09

## 2024-12-21 RX ADMIN — NICOTINE POLACRILEX 2 MG: 2 GUM, CHEWING ORAL at 17:08

## 2024-12-21 RX ADMIN — LORATADINE 10 MG: 10 TABLET ORAL at 08:31

## 2024-12-21 RX ADMIN — PROPRANOLOL HYDROCHLORIDE 10 MG: 10 TABLET ORAL at 08:43

## 2024-12-21 RX ADMIN — SENNOSIDES AND DOCUSATE SODIUM 2 TABLET: 50; 8.6 TABLET ORAL at 21:08

## 2024-12-21 RX ADMIN — DOCUSATE SODIUM 100 MG: 100 CAPSULE, LIQUID FILLED ORAL at 08:32

## 2024-12-21 RX ADMIN — NICOTINE POLACRILEX 2 MG: 2 GUM, CHEWING ORAL at 13:15

## 2024-12-21 RX ADMIN — MULTIPLE VITAMINS W/ MINERALS TAB 1 TABLET: TAB ORAL at 08:31

## 2024-12-21 RX ADMIN — ESCITALOPRAM OXALATE 20 MG: 10 TABLET, FILM COATED ORAL at 08:31

## 2024-12-21 RX ADMIN — NICOTINE POLACRILEX 2 MG: 2 GUM, CHEWING ORAL at 21:08

## 2024-12-21 RX ADMIN — CEPHALEXIN 500 MG: 500 CAPSULE ORAL at 05:38

## 2024-12-21 RX ADMIN — PROPRANOLOL HYDROCHLORIDE 10 MG: 10 TABLET ORAL at 21:08

## 2024-12-21 RX ADMIN — ATROPINE SULFATE 1 DROP: 10 SOLUTION/ DROPS OPHTHALMIC at 21:10

## 2024-12-21 RX ADMIN — HYDROCHLOROTHIAZIDE 12.5 MG: 12.5 TABLET ORAL at 08:44

## 2024-12-21 RX ADMIN — MELATONIN TAB 3 MG 9 MG: 3 TAB at 21:08

## 2024-12-21 NOTE — NURSING NOTE
Patient has been visible on the unit most of the shift. Social with peers. Offers no current complaints. Denies suicidal ideations. Pleasant and cooperative. Smiling when approached. Continues to have auditory hallucinations telling him they will harm him and his family. Support offered.

## 2024-12-21 NOTE — PLAN OF CARE
Problem: Alteration in Thoughts and Perception  Goal: Treatment Goal: Gain control of psychotic behaviors/thinking, reduce/eliminate presenting symptoms and demonstrate improved reality functioning upon discharge  Outcome: Progressing  Goal: Refrain from acting on delusional thinking/internal stimuli  Description: Interventions:  - Monitor patient closely, per order   - Utilize least restrictive measures   - Set reasonable limits, give positive feedback for acceptable   - Administer medications as ordered and monitor of potential side effects  Outcome: Progressing  Goal: Agree to be compliant with medication regime, as prescribed and report medication side effects  Description: Interventions:  - Offer appropriate PRN medication and supervise ingestion; conduct AIMS, as needed   Outcome: Progressing  Goal: Attend and participate in unit activities, including therapeutic, recreational, and educational groups  Description: Interventions:  -Encourage Visitation and family involvement in care  Outcome: Progressing  Goal: Recognize dysfunctional thoughts, communicate reality-based thoughts at the time of discharge  Description: Interventions:  - Provide medication and psycho-education to assist patient in compliance and developing insight into his/her illness   Outcome: Progressing  Goal: Complete daily ADLs, including personal hygiene independently, as able  Description: Interventions:  - Observe, teach, and assist patient with ADLS  - Monitor and promote a balance of rest/activity, with adequate nutrition and elimination   Outcome: Progressing     Problem: Ineffective Coping  Goal: Participates in unit activities  Description: Interventions:  - Provide therapeutic environment   - Provide required programming   - Redirect inappropriate behaviors   Outcome: Progressing  Goal: Patient/Family participate in treatment and DC plans  Description: Interventions:  - Provide therapeutic environment  Outcome: Progressing  Goal:  Patient/Family verbalizes awareness of resources  Outcome: Progressing     Problem: Depression  Goal: Treatment Goal: Demonstrate behavioral control of depressive symptoms, verbalize feelings of improved mood/affect, and adopt new coping skills prior to discharge  Outcome: Progressing  Goal: Verbalize thoughts and feelings  Description: Interventions:  - Assess and re-assess patient's level of risk   - Engage patient in 1:1 interactions, daily, for a minimum of 15 minutes   - Encourage patient to express feelings, fears, frustrations, hopes   Outcome: Progressing  Goal: Refrain from harming self  Description: Interventions:  - Monitor patient closely, per order   - Supervise medication ingestion, monitor effects and side effects   Outcome: Progressing  Goal: Refrain from isolation  Description: Interventions:  - Develop a trusting relationship   - Encourage socialization   Outcome: Progressing  Goal: Refrain from self-neglect  Outcome: Progressing  Goal: Attend and participate in unit activities, including therapeutic, recreational, and educational groups  Description: Interventions:  - Provide therapeutic and educational activities daily, encourage attendance and participation, and document same in the medical record   Outcome: Progressing     Problem: Anxiety  Goal: Anxiety is at manageable level  Description: Interventions:  - Assess and monitor patient's anxiety level.   - Monitor for signs and symptoms (heart palpitations, chest pain, shortness of breath, headaches, nausea, feeling jumpy, restlessness, irritable, apprehensive).   - Collaborate with interdisciplinary team and initiate plan and interventions as ordered.  - [place] patient to unit/surroundings  - Explain treatment plan  - Encourage participation in care  - Encourage verbalization of concerns/fears  - Identify coping mechanisms  - Assist in developing anxiety-reducing skills  - Administer/offer alternative therapies  - Limit or eliminate  stimulants  Outcome: Progressing     Problem: Alteration in Orientation  Goal: Treatment Goal: Demonstrate a reduction of confusion and improved orientation to person, place, time and/or situation upon discharge, according to optimum baseline/ability  Outcome: Progressing  Goal: Interact with staff daily  Description: Interventions:  - Assess and re-assess patient's level of orientation  - Engage patient in 1 on 1 interactions, daily, for a minimum of 15 minutes   - Establish rapport/trust with patient   Outcome: Progressing  Goal: Allow medical examinations, as recommended  Description: Interventions:  - Provide physical/neurological exams and/or referrals, per provider   Outcome: Progressing  Goal: Cooperate with recommended testing/procedures  Description: Interventions:  - Determine need for ancillary testing  - Observe for mental status changes  - Implement falls/precaution protocol   Outcome: Progressing  Goal: Complete daily ADLs, including personal hygiene independently, as able  Description: Interventions:  - Observe, teach, and assist patient with ADLS  Outcome: Progressing     Problem: DISCHARGE PLANNING - CARE MANAGEMENT  Goal: Discharge to post-acute care or home with appropriate resources  Description: INTERVENTIONS:  - Conduct assessment to determine patient/family and health care team treatment goals, and need for post-acute services based on payer coverage, community resources, and patient preferences, and barriers to discharge  - Address psychosocial, clinical, and financial barriers to discharge as identified in assessment in conjunction with the patient/family and health care team  - Arrange appropriate level of post-acute services according to patient's   needs and preference and payer coverage in collaboration with the physician and health care team  - Communicate with and update the patient/family, physician, and health care team regarding progress on the discharge plan  - Arrange appropriate  transportation to post-acute venues  Outcome: Progressing

## 2024-12-21 NOTE — NURSING NOTE
Alberto has been sleeping since the beginning of the shift. He woke for VS to be done. Took medication, at bedside, without difficulty. Amlodipine was held due to not meeting parameters (111/62). Nicotine gum given at 0832. He did not eat breakfast due to being asleep. Continue to monitor. Precautions maintained.

## 2024-12-21 NOTE — ASSESSMENT & PLAN NOTE
Reviewed 12/21/24  Follows with medical team  Continue senna-docusate sodium dose per medical team.

## 2024-12-21 NOTE — PROGRESS NOTES
Progress Note - Behavioral Health   Name: Alberto Berumen 29 y.o. male I MRN: [id number]  Unit/Bed#: [facility] 112-02 I Date of Admission: 3/29/2024   Date of Service: 12/21/2024 I Hospital Day: 267     Assessment & Plan  Schizoaffective disorder, bipolar type (HCC)  Reviewed on 12/21/2024.  No significant chnages  Continue medication as follows:  Clozapine 250 mg QHS for psychosis - increased 12/11/24  To consider further careful titration  CBC w/diff and CK every 2 weeks  CK was increased to 960 on 12/12 and oral hydration encouraged, CK improved to 905 on 12/13/24. Repeat on 12/17/24 decreased to 674.and I repeated on 12/20/2024 which came back as 448 and again reminded to drink excess fluids  Continue Abilify 30 mg daily for psychosis as he is resistant to single antipsychotic  Continue Escitalopram 20 mg daily for depression and anxiety  Continue Robinul 1 mg BID and 2 mg QHS for sialorrhea from Clozapine  Continue Atropine 1% solution 1 drop sublingually QHS for adverse effects of Clozapine  Continue Propanolol 10 mg BID for anxiety   Continue Melatonin 9 mg QHS for sleep  Continue Senna-Docusate sodium one 50 mg tablet QHS for constipation    Pt remains on dual antipsychotic Tx due to failure on monotherapy   .  S/p ECT treatments.  - ACT team referral was made for him living back with his aunt once MA funding comes through from the [facility]  Chronic idiopathic constipation  Reviewed 12/21/24  Follows with medical team  Continue senna-docusate sodium dose per medical team.   GERD (gastroesophageal reflux disease)  Reviewed 12/21/24  Follows with medical  Continue famotidine 20 mg tablet BID per medical team   Continue glycopyrrolate 1 mg tablet BID AM and afternoon per medical team  Continue glycopyrrolate 2 mg tablet before bed per medical team  Continue pantoprazole EC 20 mg tablet every morning per medical team  This is a chronic condition, and is stable unless otherwise noted.  Tobacco  abuse  Reviewed 12/20/24, on nicotine gum as as needed and encouraged smoking cessation  Continue to encourage cessation upon discharge    Elevated hemoglobin A1c  Reviewed 12/20/24  Follows with medical team    Class 2 obesity in adult  Reviewed 12/21/24  Follows with medical team and given dietary counseling and need for exercise    Primary hypertension  Reviewed 12/21/24  Follows with medical team on hydrochlorothiazide and Norvasc      Patient Active Problem List   Diagnosis    GERD (gastroesophageal reflux disease)    Schizoaffective disorder, bipolar type (HCC)    Tobacco abuse    T wave inversion in EKG    Syringoma    Chronic idiopathic constipation    Vitamin B 12 deficiency    Vitamin D deficiency    Confluent and reticulate papillomatosis    Class 2 obesity in adult    Primary hypertension    Elevated hemoglobin A1c    Bilateral lower extremity edema     Review of systems: CK came down to 448 and reminded to drink excess fluids   assessment  Overall Status: Status quo  certification Statement: The patient will continue to require additional inpatient hospital stay due to ongoing voices that are threatening to mixing lack of response to medications and ECT until eligible for an ACT team to live with his aunt.     Medications:  propranolol 10 mg every 12 hours  for anxiety, Abilify 30 mg mg at bedtime Lexapro 20 mg once a day,  and senna once a day day for constipation clozapine 250 mg at bedtime, Robinul 1 mg twice a day and 2 mg at bedtime for drooling from Clozapine, Colace 100 mg twice a day with MiraLAX daily for constipation from Clozapine    side effects from treatment: None reported  Medication changes   Clozapine increased to 250 mg on 12/11/2024 from 225 mg  medication education  Risks side effects benefits and precautions of medications discussed with patient and he did verbalize an understanding about risks for metabolic syndrome from being on neuroleptics and risk for tardive dyskinesia etc.    All medications reviewed  Understanding of medications: Has some understanding  Justification for dual anti-psychotics: due to not responding to single antipsychotics    Non-pharmacological treatments  Continue with individual, group, milieu and occupational therapy using recovery principles and psycho-education about accepting illness and the need for treatment.  Waiting for [facility] to fund an ACT team as he lost his MA benefits and to live with aunt in the [facility]  Maintenance ECTs  completed several months ago  Refuses to see a dietician and agrees to do more exercises as he is about 100 lbs overweight   Prolactin level high so Risperdal replaced with Abilify which he has tolerated well so far with prolactin level back to normal  Counseled to take the stool softeners and laxatives regularly  Dietary counseling given to watch diet and to exercise    Safety  Safety and communication plan established to target dynamic risk factors discussed above.    Discharge Plan back to his aunt when [facility] starts funding for ACT program as his medical assistance benefits are no longer available since he is on Medicare and not eligible for medical assistance but his sister is going to help him reapply for MA benefits per request from the [facility]  Interval Progress   Continues to his same threatening voices as before for over 3 years to kill him and his family when he gets out and now realizing and accepting the fact that he needs to learn to live with them and learn distraction techniques like using the punching bag or doing activities and talking with others.  Does report some dysphoria over this but not suicidal and has not been aggressive or agitated or threatening or self-abusive with no need for behavioral PRNs.  Paces back and forth interacting with select peers talking and meeting with siblings and family.  Sister to visit with him on 12/26/2024.  Repeat CK came back as 448 and again recommended to drink fluids to bring it  down  acceptance by patient: Accepting  Hopefulness in recovery: Living with his aunt  Involved in reintegration process: Talking to siblings and meeting with them   trusting in relationship with psychiatrist: Trusting  Sleep: Good  Appetite: Good  Compliance with Medications: Good  Group attendance: Most of the groups  Significant events and progress towards goals: Status quo CK trending down    Mental Status Exam  Appearance: age appropriate, improved grooming, looks older than stated age, overweight, with hair in dreadlocks casually dressed with a clean shaven face, fairly groomed with good eye contact found pacing the hallway with good eye contact well-groomed, found listening to music on the electronic tablet in the dining bautista today when approached  behavior: Friendly pleasant and cooperative with good eye contact  speech: normal rate, normal volume, normal pitch  Mood: dysphoric, anxious, continues to report feeling very good because of the voices that are never going away  affect: constricted, inappropriate, mood-congruent, with no good mood reactivity .    thought Process: organized, logical, coherent, goal directed, linear, decreased rate of thoughts, slowing of thoughts, negative thinking, impaired abstract reasoning, concrete  Thought Content: paranoid ideation, some paranoia, grandiose ideas, intrusive thoughts, preoccupied, chronic, continues to report paranoia about people threatening to kill him and his family because of the voices.  He believes ECT has helped so that he is not much in his head.  No other delusions elicited.  No current suicidal or homicidal thoughts and no plans verbalized but today reports having passive death wishes because of voices with no plans and able to CFS and it has not changed yet  .  No phobias obsessions compulsions or distorted body perceptions reported.   Perceptual Disturbances: Continues to report same threatening voices not commanding   Hx Risk Factors: chronic  psychiatric problems, chronic anxiety symptoms, chronic psychotic symptoms,    Sensorium: Oriented x 3 spheres and situation  Attention and concentration span: Intact  Cognition: recent and remote memory grossly intact  Consciousness: alert and awake  Intellect: appears to be of average intelligence  Insight: intact  Judgement: intact  Motor Activity: no abnormal movements     Vitals  Temp:  [98 °F (36.7 °C)-98.2 °F (36.8 °C)] 98.2 °F (36.8 °C)  HR:  [72-98] 72  Resp:  [16-18] 16  BP: (111-147)/(62-73) 111/62  SpO2:  [99 %-100 %] 99 %    Intake/Output Summary (Last 24 hours) at 12/21/2024 1103  Last data filed at 12/20/2024 1723  Gross per 24 hour   Intake --   Output 100 ml   Net -100 ml           Lab Results: All labs reviewed and prolactin level came down to normal on 8/1/2024, clozapine level 820 on 9/5/24  Current Facility-Administered Medications   Medication Dose Route Frequency Provider Last Rate    acetaminophen  650 mg Oral Q4H PRN Jordan C Holter, DO      acetaminophen  650 mg Oral Q6H PRN HOLLI Lion      aluminum-magnesium hydroxide-simethicone  30 mL Oral Q4H PRN Jordan C Holter, DO      amLODIPine  5 mg Oral Daily HOLLI Lion      ARIPiprazole  30 mg Oral Daily Bora Rosario MD      Artificial Tears  1 drop Both Eyes Q3H PRN Jordan C Holter, DO      atropine  1 drop Sublingual  Harjeet Torres,       haloperidol lactate  2.5 mg Intramuscular Q4H PRN Max 4/day HOLLI Lion      And    LORazepam  1 mg Intramuscular Q4H PRN Max 4/day HOLLI Lion      And    benztropine  0.5 mg Intramuscular Q4H PRN Max 4/day HOLLI Lion      benztropine  1 mg Intramuscular Q4H PRN Max 6/day Jordan C Holter, DO      haloperidol lactate  5 mg Intramuscular Q4H PRN Max 4/day HOLLI Lion      And    LORazepam  2 mg Intramuscular Q4H PRN Max 4/day HOLLI Lion      And    benztropine  1 mg Intramuscular Q4H PRN Max 4/day HOLLI Lion      benztropine  1 mg Oral Q4H PRN  Max 6/day HOLLI Lion      benztropine  1 mg Oral Q4H PRN Max 6/day Ion C Holter, DO      bisacodyl  10 mg Rectal Daily PRN HOLLI Lion      calcium carbonate  500 mg Oral BID PRN HOLLI Monge      cephalexin  500 mg Oral Q6H KAYLIE Pia Mnatilla, HOLLI      cloZAPine  250 mg Oral HS Bora Rosario MD      hydrOXYzine HCL  50 mg Oral Q6H PRN Max 4/day Ion C Holter, DO      Or    diphenhydrAMINE  50 mg Intramuscular Q6H PRN Ion  Holter, DO      hydrOXYzine HCL  50 mg Oral Q6H PRN Max 4/day HOLLI Lion      Or    diphenhydrAMINE  50 mg Intramuscular Q6H PRN HOLLI Lion      diphenhydrAMINE-zinc acetate   Topical BID PRN HOLLI Lion      docusate sodium  100 mg Oral BID Jourdan Dickens,       escitalopram  20 mg Oral Daily HOLLI Lion      famotidine  20 mg Oral BID PRN HOLLI Monge      fluticasone  1 spray Each Nare Daily PRN HOLLI Monge      glycopyrrolate  1 mg Oral BID (AM & Afternoon) Bora Rosario MD      glycopyrrolate  2 mg Oral HS Bora Rosario MD      haloperidol  1 mg Oral Q6H PRN HOLLI Lion      haloperidol  2.5 mg Oral Q4H PRN Max 4/day HOLLI Lion      haloperidol  5 mg Oral Q4H PRN Max 4/day HOLLI Lion      hydroCHLOROthiazide  12.5 mg Oral Daily Pia Mantilla, HOLLI      hydrocortisone   Topical 4x Daily PRN HOLLI Lion      hydrOXYzine HCL  100 mg Oral Q6H PRN Max 4/day Ion FISCHER Holter, DO      Or    LORazepam  2 mg Intramuscular Q6H PRN Ion FISCHER Holter, DO      hydrOXYzine HCL  100 mg Oral Q6H PRN Max 4/day HOLLI Lion      Or    LORazepam  2 mg Intramuscular Q6H PRN HOLLI Lion      hydrOXYzine HCL  25 mg Oral Q6H PRN Max 4/day Ion C Holter, DO      ibuprofen  600 mg Oral Q8H PRN HOLLI Lion      influenza vaccine  0.5 mL Intramuscular Once Bora Rosario MD      loratadine  10 mg Oral Daily Brina Guillen MD      magnesium hydroxide  30  mL Oral Once Jourdan Dickens, DO      melatonin  3 mg Oral HS PRN Bora Rosario MD      melatonin  9 mg Oral HS Bora Rosario MD      methocarbamol  500 mg Oral Q6H PRN HOLLI Lion      multivitamin-minerals  1 tablet Oral Daily Eileen GonzalezHOLLI evans      nicotine polacrilex  2 mg Oral Q4H PRN Bora Rosario MD      OLANZapine  5 mg Oral Q4H PRN Max 3/day [facility] Holter, DO      Or    OLANZapine  2.5 mg Intramuscular Q4H PRN Max 3/day [facility] Holter, DO      OLANZapine  5 mg Oral Q3H PRN Max 3/day [facility] Holter, DO      Or    OLANZapine  5 mg Intramuscular Q3H PRN Max 3/day [facility] Holter, DO      OLANZapine  2.5 mg Oral Q4H PRN Max 6/day [facility] Holter, DO      ondansetron  4 mg Oral Q6H PRN HOLLI Lion      pantoprazole  20 mg Oral QAM [facility] Holter, DO      polyethylene glycol  17 g Oral Daily PRN HOLLI Ghotra      polyethylene glycol  17 g Oral Daily Jourdan Dickens, DO      propranolol  10 mg Oral Q12H KAYLIE HOLLI Lion      senna-docusate sodium  1 tablet Oral Daily PRN [facility] Holter, DO      senna-docusate sodium  2 tablet Oral HS Jourdan Dickens DO      traZODone  50 mg Oral HS PRN HOLLI Lion      white petrolatum-mineral oil   Topical TID PRN HOLLI Lion         Counseling / Coordination of Care: Total floor / unit time spent today 15 minutes. Greater than 50% of total time was spent with the patient and / or family counseling and / or somewhat receptive to supportive listening and teaching positive coping skills to deal with symptom mangement.     Patient's Rights, confidentiality and exceptions to confidentiality, use of automated medical record, Behavioral Health Services staff access to medical record, and consent to treatment reviewed.    This note has been dictated and hence there may be problems with punctuation, spelling and formatting and if anyone has any concerns please address them to Dr. Rosario   This note is not shared with patient  due to potential for making patient's condition worse by knowing the content of the note.  MsFreddie Hugh occasionally medications occasionally but that she is not happy

## 2024-12-21 NOTE — ASSESSMENT & PLAN NOTE
Reviewed on 12/21/2024.  No significant chnages  Continue medication as follows:  Clozapine 250 mg QHS for psychosis - increased 12/11/24  To consider further careful titration  CBC w/diff and CK every 2 weeks  CK was increased to 960 on 12/12 and oral hydration encouraged, CK improved to 905 on 12/13/24. Repeat on 12/17/24 decreased to 674.and I repeated on 12/20/2024 which came back as 448 and again reminded to drink excess fluids  Continue Abilify 30 mg daily for psychosis as he is resistant to single antipsychotic  Continue Escitalopram 20 mg daily for depression and anxiety  Continue Robinul 1 mg BID and 2 mg QHS for sialorrhea from Clozapine  Continue Atropine 1% solution 1 drop sublingually QHS for adverse effects of Clozapine  Continue Propanolol 10 mg BID for anxiety   Continue Melatonin 9 mg QHS for sleep  Continue Senna-Docusate sodium one 50 mg tablet QHS for constipation    Pt remains on dual antipsychotic Tx due to failure on monotherapy   .  S/p ECT treatments.  - ACT team referral was made for him living back with his aunt once MA funding comes through from the [facility]

## 2024-12-21 NOTE — NURSING NOTE
Received pt in bed at change of shift with eyes closed; chest movement noted.  Continues the same thus this far as per q 15 min room checks.   Will continue to monitor behavior, sleeping pattern and any medical issues that may arise.    0601:  Sleeping 7+ hrs thus this far

## 2024-12-21 NOTE — NURSING NOTE
"This writer tried to assess left groin area twice this AM. He wants to wait until later due to being asleep all morning. He stated that \"it is better\".  Continues on antibiotic po.   "

## 2024-12-21 NOTE — ASSESSMENT & PLAN NOTE
Reviewed 12/21/24  Follows with medical  Continue famotidine 20 mg tablet BID per medical team   Continue glycopyrrolate 1 mg tablet BID AM and afternoon per medical team  Continue glycopyrrolate 2 mg tablet before bed per medical team  Continue pantoprazole EC 20 mg tablet every morning per medical team  This is a chronic condition, and is stable unless otherwise noted.

## 2024-12-21 NOTE — NURSING NOTE
Visible in unit, social with select peers. Compliant with meds and meals. Continues to hear voices that tell him to kill himself and want to harm his family. Emotional support provided. Presently denies SI/HI.

## 2024-12-22 VITALS
BODY MASS INDEX: 39.05 KG/M2 | RESPIRATION RATE: 18 BRPM | SYSTOLIC BLOOD PRESSURE: 120 MMHG | HEART RATE: 96 BPM | TEMPERATURE: 98.1 F | WEIGHT: 243 LBS | HEIGHT: 66 IN | OXYGEN SATURATION: 98 % | DIASTOLIC BLOOD PRESSURE: 71 MMHG

## 2024-12-22 PROCEDURE — 99232 SBSQ HOSP IP/OBS MODERATE 35: CPT | Performed by: PSYCHIATRY & NEUROLOGY

## 2024-12-22 RX ADMIN — PROPRANOLOL HYDROCHLORIDE 10 MG: 10 TABLET ORAL at 21:20

## 2024-12-22 RX ADMIN — NICOTINE POLACRILEX 2 MG: 2 GUM, CHEWING ORAL at 21:19

## 2024-12-22 RX ADMIN — NICOTINE POLACRILEX 2 MG: 2 GUM, CHEWING ORAL at 17:16

## 2024-12-22 RX ADMIN — CEPHALEXIN 500 MG: 500 CAPSULE ORAL at 06:11

## 2024-12-22 RX ADMIN — MELATONIN TAB 3 MG 9 MG: 3 TAB at 21:19

## 2024-12-22 RX ADMIN — CEPHALEXIN 500 MG: 500 CAPSULE ORAL at 17:16

## 2024-12-22 RX ADMIN — DOCUSATE SODIUM 100 MG: 100 CAPSULE, LIQUID FILLED ORAL at 17:16

## 2024-12-22 RX ADMIN — AMLODIPINE BESYLATE 5 MG: 5 TABLET ORAL at 08:44

## 2024-12-22 RX ADMIN — ARIPIPRAZOLE 30 MG: 15 TABLET ORAL at 08:45

## 2024-12-22 RX ADMIN — NICOTINE POLACRILEX 2 MG: 2 GUM, CHEWING ORAL at 08:45

## 2024-12-22 RX ADMIN — CEPHALEXIN 500 MG: 500 CAPSULE ORAL at 12:45

## 2024-12-22 RX ADMIN — HYDROCHLOROTHIAZIDE 12.5 MG: 12.5 TABLET ORAL at 08:44

## 2024-12-22 RX ADMIN — GLYCOPYRROLATE 1 MG: 1 TABLET ORAL at 13:30

## 2024-12-22 RX ADMIN — DOCUSATE SODIUM 100 MG: 100 CAPSULE, LIQUID FILLED ORAL at 08:45

## 2024-12-22 RX ADMIN — GLYCOPYRROLATE 1 MG: 1 TABLET ORAL at 08:43

## 2024-12-22 RX ADMIN — CLOZAPINE 250 MG: 25 TABLET ORAL at 21:19

## 2024-12-22 RX ADMIN — CEPHALEXIN 500 MG: 500 CAPSULE ORAL at 00:01

## 2024-12-22 RX ADMIN — LORATADINE 10 MG: 10 TABLET ORAL at 08:45

## 2024-12-22 RX ADMIN — PANTOPRAZOLE SODIUM 20 MG: 20 TABLET, DELAYED RELEASE ORAL at 08:43

## 2024-12-22 RX ADMIN — NICOTINE POLACRILEX 2 MG: 2 GUM, CHEWING ORAL at 12:45

## 2024-12-22 RX ADMIN — SENNOSIDES AND DOCUSATE SODIUM 2 TABLET: 50; 8.6 TABLET ORAL at 21:20

## 2024-12-22 RX ADMIN — GLYCOPYRROLATE 2 MG: 1 TABLET ORAL at 21:19

## 2024-12-22 RX ADMIN — ESCITALOPRAM OXALATE 20 MG: 10 TABLET, FILM COATED ORAL at 08:44

## 2024-12-22 RX ADMIN — PROPRANOLOL HYDROCHLORIDE 10 MG: 10 TABLET ORAL at 08:44

## 2024-12-22 RX ADMIN — MULTIPLE VITAMINS W/ MINERALS TAB 1 TABLET: TAB ORAL at 08:43

## 2024-12-22 NOTE — ASSESSMENT & PLAN NOTE
Reviewed 12/22/24, on nicotine gum as as needed and encouraged smoking cessation  Continue to encourage cessation upon discharge

## 2024-12-22 NOTE — PLAN OF CARE
Problem: Ineffective Coping  Goal: Demonstrates healthy coping skills  Outcome: Progressing     Problem: Depression  Goal: Refrain from harming self  Description: Interventions:  - Monitor patient closely, per order   - Supervise medication ingestion, monitor effects and side effects   Outcome: Progressing  Goal: Refrain from isolation  Description: Interventions:  - Develop a trusting relationship   - Encourage socialization   Outcome: Progressing  Goal: Refrain from self-neglect  Outcome: Progressing     Problem: Anxiety  Goal: Anxiety is at manageable level  Description: Interventions:  - Assess and monitor patient's anxiety level.   - Monitor for signs and symptoms (heart palpitations, chest pain, shortness of breath, headaches, nausea, feeling jumpy, restlessness, irritable, apprehensive).   - Collaborate with interdisciplinary team and initiate plan and interventions as ordered.  - [place] patient to unit/surroundings  - Explain treatment plan  - Encourage participation in care  - Encourage verbalization of concerns/fears  - Identify coping mechanisms  - Assist in developing anxiety-reducing skills  - Administer/offer alternative therapies  - Limit or eliminate stimulants  Outcome: Progressing     Problem: Alteration in Orientation  Goal: Interact with staff daily  Description: Interventions:  - Assess and re-assess patient's level of orientation  - Engage patient in 1 on 1 interactions, daily, for a minimum of 15 minutes   - Establish rapport/trust with patient   Outcome: Progressing  Goal: Allow medical examinations, as recommended  Description: Interventions:  - Provide physical/neurological exams and/or referrals, per provider   Outcome: Progressing  Goal: Cooperate with recommended testing/procedures  Description: Interventions:  - Determine need for ancillary testing  - Observe for mental status changes  - Implement falls/precaution protocol   Outcome: Progressing

## 2024-12-22 NOTE — ASSESSMENT & PLAN NOTE
Reviewed 12/22/24  Follows with medical team  Continue senna-docusate sodium dose per medical team.

## 2024-12-22 NOTE — ASSESSMENT & PLAN NOTE
Reviewed 12/22/24  Follows with medical  Continue famotidine 20 mg tablet BID per medical team   Continue glycopyrrolate 1 mg tablet BID AM and afternoon per medical team  Continue glycopyrrolate 2 mg tablet before bed per medical team  Continue pantoprazole EC 20 mg tablet every morning per medical team  This is a chronic condition, and is stable unless otherwise noted.

## 2024-12-22 NOTE — PROGRESS NOTES
Progress Note - Behavioral Health   Name: Alberto Berumen 29 y.o. male I MRN: [id number]  Unit/Bed#: [facility] 112-02 I Date of Admission: 3/29/2024   Date of Service: 12/22/2024 I Hospital Day: 268     Assessment & Plan  Schizoaffective disorder, bipolar type (HCC)  Reviewed on 12/22/2024.  No significant changes still with same threatening voices  Continue medication as follows:  Clozapine 250 mg QHS for psychosis - increased 12/11/24  To consider further careful titration  CBC w/diff and CK every 2 weeks  CK was increased to 960 on 12/12 and oral hydration encouraged, CK improved to 905 on 12/13/24. Repeat on 12/17/24 decreased to 674.and I repeated on 12/20/2024 which came back as 448 and again reminded to drink excess fluids  Continue Abilify 30 mg daily for psychosis as he is resistant to single antipsychotic  Continue Escitalopram 20 mg daily for depression and anxiety  Continue Robinul 1 mg BID and 2 mg QHS for sialorrhea from Clozapine  Continue Atropine 1% solution 1 drop sublingually QHS for adverse effects of Clozapine  Continue Propanolol 10 mg BID for anxiety   Continue Melatonin 9 mg QHS for sleep  Continue Senna-Docusate sodium one 50 mg tablet QHS for constipation    Pt remains on dual antipsychotic Tx due to failure on monotherapy   .  S/p ECT treatments.  - ACT team referral was made for him living back with his aunt once MA funding comes through from the [facility]  Chronic idiopathic constipation  Reviewed 12/22/24  Follows with medical team  Continue senna-docusate sodium dose per medical team.   GERD (gastroesophageal reflux disease)  Reviewed 12/22/24  Follows with medical  Continue famotidine 20 mg tablet BID per medical team   Continue glycopyrrolate 1 mg tablet BID AM and afternoon per medical team  Continue glycopyrrolate 2 mg tablet before bed per medical team  Continue pantoprazole EC 20 mg tablet every morning per medical team  This is a chronic condition, and is stable unless  otherwise noted.  Tobacco abuse  Reviewed 12/22/24, on nicotine gum as as needed and encouraged smoking cessation  Continue to encourage cessation upon discharge    Elevated hemoglobin A1c  Reviewed 12/22/24  Follows with medical team    Class 2 obesity in adult  Reviewed 12/22/24  Follows with medical team and given dietary counseling and need for exercise    Primary hypertension  Reviewed 12/22/24  Follows with medical team on hydrochlorothiazide and Norvasc      Patient Active Problem List   Diagnosis    GERD (gastroesophageal reflux disease)    Schizoaffective disorder, bipolar type (HCC)    Tobacco abuse    T wave inversion in EKG    Syringoma    Chronic idiopathic constipation    Vitamin B 12 deficiency    Vitamin D deficiency    Confluent and reticulate papillomatosis    Class 2 obesity in adult    Primary hypertension    Elevated hemoglobin A1c    Bilateral lower extremity edema     Review of systems: Still on Keflex for ingrown hair on his growing otherwise unremarkable CK still slightly high and reminded to drink excess fluids   assessment  Overall Status: Status quo  certification Statement: The patient will continue to require additional inpatient hospital stay due to ongoing voices that are threatening to mixing lack of response to medications and ECT until eligible for an ACT team to live with his aunt.     Medications:  propranolol 10 mg every 12 hours  for anxiety, Abilify 30 mg mg at bedtime Lexapro 20 mg once a day,  and senna once a day day for constipation clozapine 250 mg at bedtime, Robinul 1 mg twice a day and 2 mg at bedtime for drooling from Clozapine, Colace 100 mg twice a day with MiraLAX daily for constipation from Clozapine    side effects from treatment: None reported  Medication changes   Clozapine increased to 250 mg on 12/11/2024 from 225 mg  medication education  Risks side effects benefits and precautions of medications discussed with patient and he did verbalize an understanding  about risks for metabolic syndrome from being on neuroleptics and risk for tardive dyskinesia etc.   All medications reviewed  Understanding of medications: Has some understanding  Justification for dual anti-psychotics: due to not responding to single antipsychotics    Non-pharmacological treatments  Continue with individual, group, milieu and occupational therapy using recovery principles and psycho-education about accepting illness and the need for treatment.  Waiting for [facility] to fund an ACT team as he lost his MA benefits and to live with aunt in the [facility]  Maintenance ECTs  completed several months ago  Refuses to see a dietician and agrees to do more exercises as he is about 100 lbs overweight   Prolactin level high so Risperdal replaced with Abilify which he has tolerated well so far with prolactin level back to normal  Counseled to take the stool softeners and laxatives regularly  Dietary counseling given to watch diet and to exercise    Safety  Safety and communication plan established to target dynamic risk factors discussed above.    Discharge Plan back to his aunt when [facility] starts funding for ACT program as his medical assistance benefits are no longer available since he is on Medicare and not eligible for medical assistance but his sister is going to help him reapply for MA benefits per request from the [facility]    Interval Progress   Again continues to have same threatening voices to kill him and his family when he gets out he has been hearing them for over 3 years and has now come to realize he needs to learn to live with them but he believes they are real and never commanding.  He is trying to use the punching bag and talk with peers and listen to music as coping mechanisms.  CK was still slightly high and reminded to drink  fluids to bring it down which will be rechecked next week.  Has not had any suicidal or homicidal thoughts or any other psychotic symptoms and is usually friendly pleasant  and cooperative with good eye contact and is polite but does report some sadness noted to the point of suicide over the voices not going away at all.  Not aggressive or agitated or threatening with no need for behavioral PRNs and no need for security to be called on his behalf lately    acceptance by patient: Accepting  Hopefulness in recovery: Living with his aunt  Involved in reintegration process: Talking to siblings and meeting with them   trusting in relationship with psychiatrist: Trusting  Sleep: Good  Appetite: Good  Compliance with Medications: Good  Group attendance: Most of the groups  Significant events and progress towards goals: Status quo and CK trending down    Mental Status Exam  Appearance: age appropriate, improved grooming, looks older than stated age, overweight, with hair in dreadlocks casually dressed with a clean shaven face, fairly groomed with good eye contact found pacing the hallway with good eye contact well-groomed, found listening to music on the electronic tablet in the dining bautista today when approached or laying on bed under the covers but easily awakened  behavior: Pleasant and friendly pleasant with good eye contact well-groomed  speech: normal rate, normal volume, normal pitch  Mood: dysphoric, anxious, continues to report feeling very good because of the voices that are never going away  affect: constricted, inappropriate, mood-congruent, with no good mood reactivity .    thought Process: organized, logical, coherent, goal directed, linear, decreased rate of thoughts, slowing of thoughts, negative thinking, impaired abstract reasoning, concrete  Thought Content: paranoid ideation, some paranoia, grandiose ideas, intrusive thoughts, preoccupied, chronic, continues to report paranoia about people threatening to kill him and his family because of the voices.  He believes ECT has helped so that he is not much in his head.  No other delusions elicited.  No current suicidal or  homicidal thoughts and no plans verbalized but today reports having passive death wishes because of voices with no plans and able to CFS and it has not changed yet  .  No phobias obsessions compulsions or distorted body perceptions reported.   Perceptual Disturbances: Continues to report same threatening voices as before   Hx Risk Factors: chronic psychiatric problems, chronic anxiety symptoms, chronic psychotic symptoms,    Sensorium: Oriented x 3 spheres and situation  Attention and concentration span: Intact  Cognition: recent and remote memory grossly intact  Consciousness: alert and awake  Intellect: appears to be of average intelligence  Insight: intact  Judgement: intact  Motor Activity: no abnormal movements     Vitals  Temp:  [97.8 °F (36.6 °C)-98.4 °F (36.9 °C)] 97.8 °F (36.6 °C)  HR:  [77-94] 77  Resp:  [18] 18  BP: (122-137)/(67-85) 122/70  SpO2:  [99 %-100 %] 99 %  No intake or output data in the 24 hours ending 12/22/24 1023          Lab Results: All labs reviewed and prolactin level came down to normal on 8/1/2024, clozapine level 820 on 9/5/24  Current Facility-Administered Medications   Medication Dose Route Frequency Provider Last Rate    acetaminophen  650 mg Oral Q4H PRN Jordan C Holter,       acetaminophen  650 mg Oral Q6H PRN HOLLI Lion      aluminum-magnesium hydroxide-simethicone  30 mL Oral Q4H PRN Jordan C Holter, DO      amLODIPine  5 mg Oral Daily HOLLI Lion      ARIPiprazole  30 mg Oral Daily Bora Rosario MD      Artificial Tears  1 drop Both Eyes Q3H PRN Jordan C Holter, DO      atropine  1 drop Sublingual HS Harjeet Torres DO      haloperidol lactate  2.5 mg Intramuscular Q4H PRN Max 4/day HOLLI Lion      And    LORazepam  1 mg Intramuscular Q4H PRN Max 4/day HOLLI Lion      And    benztropine  0.5 mg Intramuscular Q4H PRN Max 4/day HOLLI Lion      benztropine  1 mg Intramuscular Q4H PRN Max 6/day Jordan C Holter,       haloperidol lactate   5 mg Intramuscular Q4H PRN Max 4/day HOLLI Lion      And    LORazepam  2 mg Intramuscular Q4H PRN Max 4/day HOLLI Lion      And    benztropine  1 mg Intramuscular Q4H PRN Max 4/day Eveline Hunt, CRNP      benztropine  1 mg Oral Q4H PRN Max 6/day Eveline Gilbert, CRNP      benztropine  1 mg Oral Q4H PRN Max 6/day Ion FISCHER Holter, DO      bisacodyl  10 mg Rectal Daily PRN Eveline Hunt, STEVENP      calcium carbonate  500 mg Oral BID PRN Eileenaddie Jensen, STEVENP      cephalexin  500 mg Oral Q6H KAYLIE Pia Mantilla, CRNP      cloZAPine  250 mg Oral HS Bora Rosario MD      hydrOXYzine HCL  50 mg Oral Q6H PRN Max 4/day Jordan C Holter, DO      Or    diphenhydrAMINE  50 mg Intramuscular Q6H PRN Jordan C Holter, DO      hydrOXYzine HCL  50 mg Oral Q6H PRN Max 4/day HOLLI Lion      Or    diphenhydrAMINE  50 mg Intramuscular Q6H PRN HOLLI Lion      diphenhydrAMINE-zinc acetate   Topical BID PRN EvelineHOLLI Christy      docusate sodium  100 mg Oral BID Jourdan Dickens,       escitalopram  20 mg Oral Daily Eveline Hunt, HOLLI      famotidine  20 mg Oral BID PRN EileenHOLLI Cummins      fluticasone  1 spray Each Nare Daily PRN HOLLI Monge      glycopyrrolate  1 mg Oral BID (AM & Afternoon) Bora Rosario MD      glycopyrrolate  2 mg Oral HS Bora Rosario MD      haloperidol  1 mg Oral Q6H PRN HOLLI Lion      haloperidol  2.5 mg Oral Q4H PRN Max 4/day HOLLI Lion      haloperidol  5 mg Oral Q4H PRN Max 4/day HOLLI Lion      hydroCHLOROthiazide  12.5 mg Oral Daily Pia Mantilla, CRNP      hydrocortisone   Topical 4x Daily PRN Eveline Hunt, CRNP      hydrOXYzine HCL  100 mg Oral Q6H PRN Max 4/day Jordan C Holter, DO      Or    LORazepam  2 mg Intramuscular Q6H PRN Jordan C Holter, DO      hydrOXYzine HCL  100 mg Oral Q6H PRN Max 4/day HOLLI Lion      Or    LORazepam  2 mg Intramuscular Q6H PRN HOLLI Lion      hydrOXYzine  HCL  25 mg Oral Q6H PRN Max 4/day [facility] Holter, DO      ibuprofen  600 mg Oral Q8H PRN HOLLI Lion      influenza vaccine  0.5 mL Intramuscular Once Bora Rosario MD      loratadine  10 mg Oral Daily Brina Guillen MD      magnesium hydroxide  30 mL Oral Once Jourdan Dickens, DO      melatonin  3 mg Oral HS PRN Bora Rosario MD      melatonin  9 mg Oral HS Bora Rosario MD      methocarbamol  500 mg Oral Q6H PRN HOLLI Lion      multivitamin-minerals  1 tablet Oral Daily Eileen Gonzalezmiryamjaylen, HOLLI      nicotine polacrilex  2 mg Oral Q4H PRN Bora Rosario MD      OLANZapine  5 mg Oral Q4H PRN Max 3/day [facility] Holter, DO      Or    OLANZapine  2.5 mg Intramuscular Q4H PRN Max 3/day [facility] Holter, DO      OLANZapine  5 mg Oral Q3H PRN Max 3/day [facility] Holter, DO      Or    OLANZapine  5 mg Intramuscular Q3H PRN Max 3/day [facility] Holter, DO      OLANZapine  2.5 mg Oral Q4H PRN Max 6/day [facility] Holter, DO      ondansetron  4 mg Oral Q6H PRN HOLLI Lion      pantoprazole  [facility]      polyethylene glycol  17 g Oral Daily PRN HOLLI Ghotra      polyethylene glycol  17 g Oral Daily Jourdan Dickens, DO      propranolol  10 mg Oral Q12H KAYLIE HOLLI Lion      senna-docusate sodium  1 tablet Oral Daily PRN [facility] Holter, DO      senna-docusate sodium  2 tablet Oral HS Jourdan Dickens, DO      traZODone  50 mg Oral HS PRN HOLLI Lion      white petrolatum-mineral oil   Topical TID PRN HOLLI Lion         Counseling / Coordination of Care: Total floor / unit time spent today 15 minutes. Greater than 50% of total time was spent with the patient and / or family counseling and / or somewhat receptive to supportive listening and teaching positive coping skills to deal with symptom mangement.     Patient's Rights, confidentiality and exceptions to confidentiality, use of automated medical record, Behavioral Health Services staff access to  medical record, and consent to treatment reviewed.    This note has been dictated and hence there may be problems with punctuation, spelling and formatting and if anyone has any concerns please address them to Dr. Rosario   This note is not shared with patient due to potential for making patient's condition worse by knowing the content of the note.  Ms. Reese occasionally medications occasionally but that she is not happy

## 2024-12-22 NOTE — NURSING NOTE
Pt is calm, cooperative and visible on milieu. Pt reports AH, depression and anxiety; denies SI/HI/VH. Pt interacts appropriately with select peers and reports sleeping well. Pt is able to make needs known and is medication compliant. Refused weekly wellness assessment. Q 15 min checks maintained.

## 2024-12-22 NOTE — ASSESSMENT & PLAN NOTE
Reviewed on 12/22/2024.  No significant changes still with same threatening voices  Continue medication as follows:  Clozapine 250 mg QHS for psychosis - increased 12/11/24  To consider further careful titration  CBC w/diff and CK every 2 weeks  CK was increased to 960 on 12/12 and oral hydration encouraged, CK improved to 905 on 12/13/24. Repeat on 12/17/24 decreased to 674.and I repeated on 12/20/2024 which came back as 448 and again reminded to drink excess fluids  Continue Abilify 30 mg daily for psychosis as he is resistant to single antipsychotic  Continue Escitalopram 20 mg daily for depression and anxiety  Continue Robinul 1 mg BID and 2 mg QHS for sialorrhea from Clozapine  Continue Atropine 1% solution 1 drop sublingually QHS for adverse effects of Clozapine  Continue Propanolol 10 mg BID for anxiety   Continue Melatonin 9 mg QHS for sleep  Continue Senna-Docusate sodium one 50 mg tablet QHS for constipation    Pt remains on dual antipsychotic Tx due to failure on monotherapy   .  S/p ECT treatments.  - ACT team referral was made for him living back with his aunt once MA funding comes through from the [facility]

## 2024-12-22 NOTE — NURSING NOTE
Patient visible on the unit most of the shift. Social with most peers.  Smiles on approach. Brighter affect.  Utilized phone most of the time. Observed talking about God and praying with another male peer.  Support offered. Appetite fair today. 0%, 100%, 0%. Group attendance 4/7. He continues to admit having auditory hallucinations of voices telling him they are going to harm him and his family. Denies suicidal ideations. Currently on Keflex for groin ingrown hair.  Offers no complaints of same. He stated it is healing. Medication compliant. Pleasant and cooperative.

## 2024-12-23 PROCEDURE — 99232 SBSQ HOSP IP/OBS MODERATE 35: CPT | Performed by: PSYCHIATRY & NEUROLOGY

## 2024-12-23 RX ADMIN — CEPHALEXIN 500 MG: 500 CAPSULE ORAL at 11:56

## 2024-12-23 RX ADMIN — CEPHALEXIN 500 MG: 500 CAPSULE ORAL at 17:31

## 2024-12-23 RX ADMIN — CLOZAPINE 250 MG: 25 TABLET ORAL at 21:27

## 2024-12-23 RX ADMIN — GLYCOPYRROLATE 1 MG: 1 TABLET ORAL at 13:10

## 2024-12-23 RX ADMIN — NICOTINE POLACRILEX 2 MG: 2 GUM, CHEWING ORAL at 09:35

## 2024-12-23 RX ADMIN — SENNOSIDES AND DOCUSATE SODIUM 2 TABLET: 50; 8.6 TABLET ORAL at 21:26

## 2024-12-23 RX ADMIN — NICOTINE POLACRILEX 2 MG: 2 GUM, CHEWING ORAL at 17:31

## 2024-12-23 RX ADMIN — DOCUSATE SODIUM 100 MG: 100 CAPSULE, LIQUID FILLED ORAL at 17:18

## 2024-12-23 RX ADMIN — ARIPIPRAZOLE 30 MG: 15 TABLET ORAL at 08:57

## 2024-12-23 RX ADMIN — CEPHALEXIN 500 MG: 500 CAPSULE ORAL at 00:01

## 2024-12-23 RX ADMIN — GLYCOPYRROLATE 1 MG: 1 TABLET ORAL at 08:59

## 2024-12-23 RX ADMIN — GLYCOPYRROLATE 2 MG: 1 TABLET ORAL at 21:26

## 2024-12-23 RX ADMIN — PANTOPRAZOLE SODIUM 20 MG: 20 TABLET, DELAYED RELEASE ORAL at 08:59

## 2024-12-23 RX ADMIN — ATROPINE SULFATE 1 DROP: 10 SOLUTION/ DROPS OPHTHALMIC at 21:26

## 2024-12-23 RX ADMIN — PROPRANOLOL HYDROCHLORIDE 10 MG: 10 TABLET ORAL at 21:31

## 2024-12-23 RX ADMIN — MELATONIN TAB 3 MG 9 MG: 3 TAB at 21:26

## 2024-12-23 RX ADMIN — CEPHALEXIN 500 MG: 500 CAPSULE ORAL at 06:07

## 2024-12-23 RX ADMIN — NICOTINE POLACRILEX 2 MG: 2 GUM, CHEWING ORAL at 13:10

## 2024-12-23 RX ADMIN — NICOTINE POLACRILEX 2 MG: 2 GUM, CHEWING ORAL at 21:26

## 2024-12-23 RX ADMIN — DOCUSATE SODIUM 100 MG: 100 CAPSULE, LIQUID FILLED ORAL at 08:58

## 2024-12-23 RX ADMIN — ESCITALOPRAM OXALATE 20 MG: 10 TABLET, FILM COATED ORAL at 08:58

## 2024-12-23 RX ADMIN — LORATADINE 10 MG: 10 TABLET ORAL at 08:59

## 2024-12-23 RX ADMIN — MULTIPLE VITAMINS W/ MINERALS TAB 1 TABLET: TAB ORAL at 08:58

## 2024-12-23 NOTE — ASSESSMENT & PLAN NOTE
Reviewed on 12/23/2024.  No significant changes still with same threatening voices  Continue medication as follows:  Clozapine 250 mg QHS for psychosis - increased 12/11/24  To consider further careful titration  CBC w/diff and CK every 2 weeks  CK was increased to 960 on 12/12 and oral hydration encouraged, CK improved to 905 on 12/13/24. Repeat on 12/17/24 decreased to 674 and on 12/20/24 decreased to 448  Labs to be drawn tomorrow, repeat CK ordered  Continue Abilify 30 mg daily for psychosis as he is resistant to single antipsychotic  Continue Lexapro 20 mg daily for depression and anxiety  Continue Robinul 1 mg BID and 2 mg QHS for sialorrhea from Clozapine  Continue Atropine 1% solution  1 drop sublingually QHS for adverse effects of Clozapine  Continue Propanolol 10 mg BID for anxiety   Continue Melatonin 9 mg QHS for sleep  Continue Senna-Docusate sodium one 50 mg tablet QHS for constipation    Pt remains on dual antipsychotic Tx due to failure on monotherapy   .  S/p ECT treatments.  - ACT team referral was made for him living back with his aunt once MA funding comes through from the [facility]

## 2024-12-23 NOTE — PLAN OF CARE
Problem: Alteration in Thoughts and Perception  Goal: Treatment Goal: Gain control of psychotic behaviors/thinking, reduce/eliminate presenting symptoms and demonstrate improved reality functioning upon discharge  Outcome: Not Progressing  Goal: Refrain from acting on delusional thinking/internal stimuli  Description: Interventions:  - Monitor patient closely, per order   - Utilize least restrictive measures   - Set reasonable limits, give positive feedback for acceptable   - Administer medications as ordered and monitor of potential side effects  Outcome: Not Progressing  Goal: Agree to be compliant with medication regime, as prescribed and report medication side effects  Description: Interventions:  - Offer appropriate PRN medication and supervise ingestion; conduct AIMS, as needed   Outcome: Progressing  Goal: Attend and participate in unit activities, including therapeutic, recreational, and educational groups  Description: Interventions:  -Encourage Visitation and family involvement in care  Outcome: Progressing     Problem: Ineffective Coping  Goal: Demonstrates healthy coping skills  Outcome: Progressing  Goal: Participates in unit activities  Description: Interventions:  - Provide therapeutic environment   - Provide required programming   - Redirect inappropriate behaviors   Outcome: Progressing     Problem: Depression  Goal: Treatment Goal: Demonstrate behavioral control of depressive symptoms, verbalize feelings of improved mood/affect, and adopt new coping skills prior to discharge  Outcome: Progressing  Goal: Verbalize thoughts and feelings  Description: Interventions:  - Assess and re-assess patient's level of risk   - Engage patient in 1:1 interactions, daily, for a minimum of 15 minutes   - Encourage patient to express feelings, fears, frustrations, hopes   Outcome: Progressing  Goal: Refrain from harming self  Description: Interventions:  - Monitor patient closely, per order   - Supervise  medication ingestion, monitor effects and side effects   Outcome: Progressing  Goal: Refrain from isolation  Description: Interventions:  - Develop a trusting relationship   - Encourage socialization   Outcome: Progressing  Goal: Refrain from self-neglect  Outcome: Progressing  Goal: Attend and participate in unit activities, including therapeutic, recreational, and educational groups  Description: Interventions:  - Provide therapeutic and educational activities daily, encourage attendance and participation, and document same in the medical record   Outcome: Progressing

## 2024-12-23 NOTE — PROGRESS NOTES
12/23/24 0826   Team Meeting   Meeting Type Daily Rounds   Team Members Present   Team Members Present Physician;Nurse;;Other (Discipline and Name)   Physician Team Member Thomas, Holter   Nursing Team Member Claudia   Social Work Team Member Jose J ORTEGA   Patient/Family Present   Patient Present No   Patient's Family Present No     Groups Participation  8/10.   Patient's compliant with medications. He's engaged in his treatment. He socializes with his peers. Family visit on 12/26.

## 2024-12-23 NOTE — NURSING NOTE
Patient slept through vitals, menus,and breakfast. His blood pressure did not meet requirements for HCTZ, Norvasc and propanolol held. He refused miralax.

## 2024-12-23 NOTE — PROGRESS NOTES
Progress Note - Behavioral Health   Name: Alberto Berumen 29 y.o. male I MRN: [id number]  Unit/Bed#: [facility] 112-02 I Date of Admission: 3/29/2024   Date of Service: 12/23/2024 I Hospital Day: 269     Assessment & Plan  Schizoaffective disorder, bipolar type (HCC)  Reviewed on 12/23/2024.  No significant changes still with same threatening voices  Continue medication as follows:  Clozapine 250 mg QHS for psychosis - increased 12/11/24  To consider further careful titration  CBC w/diff and CK every 2 weeks  CK was increased to 960 on 12/12 and oral hydration encouraged, CK improved to 905 on 12/13/24. Repeat on 12/17/24 decreased to 674 and on 12/20/24 decreased to 448  Labs to be drawn tomorrow, repeat CK ordered  Continue Abilify 30 mg daily for psychosis as he is resistant to single antipsychotic  Continue Lexapro 20 mg daily for depression and anxiety  Continue Robinul 1 mg BID and 2 mg QHS for sialorrhea from Clozapine  Continue Atropine 1% solution  1 drop sublingually QHS for adverse effects of Clozapine  Continue Propanolol 10 mg BID for anxiety   Continue Melatonin 9 mg QHS for sleep  Continue Senna-Docusate sodium one 50 mg tablet QHS for constipation    Pt remains on dual antipsychotic Tx due to failure on monotherapy   .  S/p ECT treatments.  - ACT team referral was made for him living back with his aunt once MA funding comes through from the [facility]  Chronic idiopathic constipation  Reviewed 12/23/24  Follows with medical team  Continue senna-docusate sodium dose per medical team.   GERD (gastroesophageal reflux disease)  Reviewed 12/23/24  Follows with medical  Continue famotidine 20 mg tablet BID per medical team   Continue glycopyrrolate 1 mg tablet BID AM and afternoon per medical team  Continue glycopyrrolate 2 mg tablet before bed per medical team  Continue pantoprazole EC 20 mg tablet every morning per medical team  This is a chronic condition, and is stable unless otherwise  noted.  Tobacco abuse  Reviewed 12/23/24, on nicotine gum as as needed and encouraged smoking cessation  Continue to encourage cessation upon discharge    Elevated hemoglobin A1c  Reviewed 12/23/24  Follows with medical team    Class 2 obesity in adult  Reviewed 12/22/24  Follows with medical team and given dietary counseling and need for exercise    Primary hypertension  Reviewed 12/23/24  Follows with medical team on hydrochlorothiazide and Norvasc    Psychiatry Progress Note [facility]    Progress towards goals: progressing    Review of systems: denied    Psychiatric Diagnosis: Schizoaffective disorder, bipolar type  Tobacco use disorder    Assessment  Overall Status:  progressing, no significant changes  Certification Statement: The patient will continue to require additional inpatient hospital stay due to psychotic related symptoms and limited response to medications and ECT      Medications: as above  Side effects from treatment: denied  Medication changes: as above  Medication education: Risks side effects benefits and precautions of medications discussed with patient and they did verbalize an understanding about risks for metabolic syndrome from being on neuroleptics and tardive dyskinesia etc.  All medications reviewed and I recommend that they be continued for symptom management  Understanding of medications: Risks side effects benefits and precautions of medications discussed with patient and they did verbalize an understanding about risks for metabolic syndrome from being on neuroleptics and tardive dyskinesia etc., and patient appeared to have understanding.  Justification for dual anti-psychotics: due to not responding to single antipsychotic    Non-pharmacological treatments  Continue with individual, group, milieu and occupational therapy using recovery principles and psycho-education about accepting illness and the need for treatment.  Behavioral health checks every 7  "minutes    Safety  Safety and communication plan established to target dynamic risk factors discussed above.    Discharge Plan   To his Aunt with [facility] funding for ACT when received    Interval Progress: Per treatment team, patient has been compliant with psychiatric medications and care - intermittently refuses Miralax and Atropine. Patient's blood pressure medications were held today due to parameters not being met. Patient is receiving Keflex x7 days for folliculitis of thigh - on day 5/7. Patient did not require PRN medications yesterday or over the weekend, does utilize Nicorette gum PRN.    Today, the patient was involved and engaged in treatment team meeting with [facility] representative present. He notes a goal he achieved last week was \"consistency,\" he identified his current symptoms as, \"hearing voices and feeling depressed.\" He states a current struggle is low self esteem and feeling triggered. He notes, \"music, shower, prayer, and working out,\" are coping skills.  The patient has had good sleep and appetite. The patient endorses voices threatening him and his family continue, he has not experienced visual hallucinations. We reviewed his current medications and reasons for prescriptions at treatment team today.  No SI, HI, passive death wishes, or thoughts to harm self or others endorsed at time of evaluation. The patient denied adverse effects of medication.    Acceptance by patient: accepting of inpatient treatment  Hopefulness in recovery: to live with his Aunt in the community  Involved in reintegration process: communicating with siblings and Aunt  Trusting in relationship with psychiatrist: trusting  Sleep: good  Appetite: good  Compliance with Medications: compliant  Group attendance: 8/10 groups attended  Significant events: none in past 24 hours    Mental Status Exam  Appearance: age appropriate, casually dressed, adequate grooming, wearing a restrepo  Behavior: cooperative, calm, good eye " "contact  Speech: normal rate, normal volume, normal pitch, not pressured, not hyperverbal, coherent  Mood:  \"depressed\"  Affect: constricted, mood-congruent  Thought Process:  organized, logical, coherent, goal directed, decreased rate at times  Thought Content: some paranoia, negative thoughts, intrusive thoughts  Perceptual Disturbances: auditory hallucinations of voices threatening to harm him and his family, denies visual hallucinations when asked, does not appear responding to internal stimuli  Risk Potential: Suicidal Ideations none, Homicidal Ideations none, and Potential for Aggression Not at present  Sensorium: alert and oriented to person, place, time and situation  Cognition: recent and remote memory grossly intact  Consciousness: alert and awake  Attention: attention span and concentration are age appropriate  Intellect: appears to be of average intelligence  Insight: intact  Judgement: intact  Motor Activity: no abnormal movements     Vitals  Temp:  [98.1 °F (36.7 °C)] 98.1 °F (36.7 °C)  HR:  [] 96  Resp:  [18] 18  BP: (120-126)/(70-71) 120/71  SpO2:  [98 %] 98 %  No intake or output data in the 24 hours ending 12/23/24 0758    Lab Results: All Labs For Current Hospital Admission Reviewed        Current Facility-Administered Medications   Medication Dose Route Frequency Provider Last Rate    acetaminophen  650 mg Oral Q4H PRN Jordan C Holter, DO      acetaminophen  650 mg Oral Q6H PRN HOLLI Lion      aluminum-magnesium hydroxide-simethicone  30 mL Oral Q4H PRN Jordan C Holter,       amLODIPine  5 mg Oral Daily HOLLI Lion      ARIPiprazole  30 mg Oral Daily Bora Rosario MD      Artificial Tears  1 drop Both Eyes Q3H PRN Jordan C Holter, DO      atropine  1 drop Sublingual  Harjeet Torres,       haloperidol lactate  2.5 mg Intramuscular Q4H PRN Max 4/day HOLLI Lion      And    LORazepam  1 mg Intramuscular Q4H PRN Max 4/day HOLLI Lion      And    benztropine  " 0.5 mg Intramuscular Q4H PRN Max 4/day HOLLI Lion      benztropine  1 mg Intramuscular Q4H PRN Max 6/day Jordan C Holter, DO      haloperidol lactate  5 mg Intramuscular Q4H PRN Max 4/day STEVE LionNP      And    LORazepam  2 mg Intramuscular Q4H PRN Max 4/day STEVE LionNP      And    benztropine  1 mg Intramuscular Q4H PRN Max 4/day HOLLI Lion      benztropine  1 mg Oral Q4H PRN Max 6/day Eveline Hunt CRNP      benztropine  1 mg Oral Q4H PRN Max 6/day Jordan C Holter, DO      bisacodyl  10 mg Rectal Daily PRN HOLLI Lion      calcium carbonate  500 mg Oral BID PRN Eileen Jensen, STEVENP      cephalexin  500 mg Oral Q6H KAYLIE Pia Mantilla, STEVENP      cloZAPine  250 mg Oral HS Bora Rosario MD      hydrOXYzine HCL  50 mg Oral Q6H PRN Max 4/day Jordan C Holter, DO      Or    diphenhydrAMINE  50 mg Intramuscular Q6H PRN Jordan C Holter, DO      hydrOXYzine HCL  50 mg Oral Q6H PRN Max 4/day HOLLI Lion      Or    diphenhydrAMINE  50 mg Intramuscular Q6H PRN HOLLI Lion      diphenhydrAMINE-zinc acetate   Topical BID PRN HOLLI Lion      docusate sodium  100 mg Oral BID Jourdan Dickens, DO      escitalopram  20 mg Oral Daily HOLLI Lion      famotidine  20 mg Oral BID PRN HOLLI Monge      fluticasone  1 spray Each Nare Daily PRN HOLLI Monge      glycopyrrolate  1 mg Oral BID (AM & Afternoon) Bora Rosario MD      glycopyrrolate  2 mg Oral HS Bora Rosario MD      haloperidol  1 mg Oral Q6H PRN HOLLI Lion      haloperidol  2.5 mg Oral Q4H PRN Max 4/day HOLLI Lion      haloperidol  5 mg Oral Q4H PRN Max 4/day HOLLI Lion      hydroCHLOROthiazide  12.5 mg Oral Daily Pia Mantilla, HOLLI      hydrocortisone   Topical 4x Daily PRN HOLLI Lion      hydrOXYzine HCL  100 mg Oral Q6H PRN Max 4/day Jordan C Holter, DO      Or    LORazepam  2 mg Intramuscular Q6H PRN Jordan C Holter, DO       hydrOXYzine HCL  100 mg Oral Q6H PRN Max 4/day HOLLI Lion      Or    LORazepam  2 mg Intramuscular Q6H PRN HOLLI Lion      hydrOXYzine HCL  25 mg Oral Q6H PRN Max 4/day [facility] Holter, DO      ibuprofen  600 mg Oral Q8H PRN HOLLI Lion      influenza vaccine  0.5 mL Intramuscular Once Bora Rosario MD      loratadine  10 mg Oral Daily Brina Guillen MD      magnesium hydroxide  30 mL Oral Once Jourdan Dickens, DO      melatonin  3 mg Oral HS PRN Bora Rosario MD      melatonin  9 mg Oral HS Bora Rosario MD      methocarbamol  500 mg Oral Q6H PRN HOLLI Lion      multivitamin-minerals  1 tablet Oral Daily Eileen MiyaHOLLI Jimenez      nicotine polacrilex  2 mg Oral Q4H PRN Bora Rosario MD      OLANZapine  5 mg Oral Q4H PRN Max 3/day [facility] Holter, DO      Or    OLANZapine  2.5 mg Intramuscular Q4H PRN Max 3/day [facility] Holter, DO      OLANZapine  5 mg Oral Q3H PRN Max 3/day [facility] Holter, DO      Or    OLANZapine  5 mg Intramuscular Q3H PRN Max 3/day [facility] Holter, DO      OLANZapine  2.5 mg Oral Q4H PRN Max 6/day [facility] Holter, DO      ondansetron  4 mg Oral Q6H PRN HOLLI Lion      pantoprazole  20 mg Oral QAM [facility] Holter, DO      polyethylene glycol  17 g Oral Daily PRN HOLLI Ghotra      polyethylene glycol  17 g Oral Daily Jourdan Dickens, DO      propranolol  10 mg Oral Q12H KAYLIE HOLLI Lion      senna-docusate sodium  1 tablet Oral Daily PRN [facility] Holter, DO      senna-docusate sodium  2 tablet Oral HS Jourdan Dickens, DO      traZODone  50 mg Oral HS PRN HOLLI Lion      white petrolatum-mineral oil   Topical TID PRN HOLLI Lion         Counseling / Coordination of Care: Total floor / unit time spent today 15 minutes. Greater than 50% of total time was spent with the patient and / or family counseling and / or somewhat receptive to supportive listening and teaching positive coping skills to deal with symptom mangement.      Patient's Rights, confidentiality and exceptions to confidentiality, use of automated medical record, Behavioral Health Services staff access to medical record, and consent to treatment reviewed.    This note has been dictated and hence there may be problems with punctuation, spelling and formatting and if anyone has any concerns please address them to Dr. Mora.  This note is not shared with patient due to potential for making patient's condition worse by knowing the content of the note.    Hollie Mora, DO  Psychiatry Resident, PGY-IV

## 2024-12-23 NOTE — ASSESSMENT & PLAN NOTE
After discussion with ep nurse and dr novak, pt vasectomy will be done in [facility]. All info sent to surgery schedulers   Reviewed 12/23/24  Follows with medical team  Continue senna-docusate sodium dose per medical team.

## 2024-12-23 NOTE — ASSESSMENT & PLAN NOTE
Reviewed 12/23/24  Follows with medical  Continue famotidine 20 mg tablet BID per medical team   Continue glycopyrrolate 1 mg tablet BID AM and afternoon per medical team  Continue glycopyrrolate 2 mg tablet before bed per medical team  Continue pantoprazole EC 20 mg tablet every morning per medical team  This is a chronic condition, and is stable unless otherwise noted.

## 2024-12-23 NOTE — PROGRESS NOTES
12/23/24 1314   Team Meeting   Meeting Type Tx Team Meeting   Initial Conference Date 12/23/24   Next Conference Date 01/06/25   Team Members Present   Team Members Present Physician;Nurse;;Other (Discipline and Name)   Physician Team Member Morgan Rosario   Nursing Team Member Claudia   Social Work Team Member Jose J ORTEGA   Other (Discipline and Name) Gaby CMP MHDS   Patient/Family Present   Patient Present Yes   Patient's Family Present No   OTHER   Team Meeting - Additional Comments Patient attended his treatment team meeting and shared his completed self-assessment. Patient discussed he continues to have psychiatric symptoms of depression and will continue to address this in his individual sessions. This writer discussed we will reapply for his MA benefits as the patient's income does not meet the requirement to only receive Medicare benefits. Patient will remain on the ACT referral wait list for [facility] patient.

## 2024-12-23 NOTE — NURSING NOTE
Patient is visible and social on the unit. He is pleasant and cooperative. He continues to endorse auditory hallucinations.  He is compliant with medications. He attends groups.

## 2024-12-23 NOTE — ASSESSMENT & PLAN NOTE
Reviewed 12/23/24, on nicotine gum as as needed and encouraged smoking cessation  Continue to encourage cessation upon discharge

## 2024-12-23 NOTE — NURSING NOTE
Patient slept well throughout the night. Eyes closed and chest movements noted. Offered no complaints and did not wake for any reason. Currently resting comfortably.

## 2024-12-23 NOTE — PLAN OF CARE
Problem: Ineffective Coping  Goal: Identifies ineffective coping skills  Outcome: Progressing  Goal: Identifies healthy coping skills  Outcome: Progressing  Goal: Demonstrates healthy coping skills  Outcome: Progressing  Goal: Participates in unit activities  Description: Interventions:  - Provide therapeutic environment   - Provide required programming   - Redirect inappropriate behaviors   Outcome: Progressing   He has been seen using his coping skills.

## 2024-12-24 LAB
BASOPHILS # BLD AUTO: 0.06 THOUSANDS/ÂΜL (ref 0–0.1)
BASOPHILS NFR BLD AUTO: 1 % (ref 0–1)
CK SERPL-CCNC: 421 U/L (ref 39–308)
EOSINOPHIL # BLD AUTO: 0.29 THOUSAND/ÂΜL (ref 0–0.61)
EOSINOPHIL NFR BLD AUTO: 3 % (ref 0–6)
ERYTHROCYTE [DISTWIDTH] IN BLOOD BY AUTOMATED COUNT: 14.3 % (ref 11.6–15.1)
HCT VFR BLD AUTO: 41.1 % (ref 36.5–49.3)
HGB BLD-MCNC: 12.5 G/DL (ref 12–17)
IMM GRANULOCYTES # BLD AUTO: 0.02 THOUSAND/UL (ref 0–0.2)
IMM GRANULOCYTES NFR BLD AUTO: 0 % (ref 0–2)
LYMPHOCYTES # BLD AUTO: 3.63 THOUSANDS/ÂΜL (ref 0.6–4.47)
LYMPHOCYTES NFR BLD AUTO: 42 % (ref 14–44)
MCH RBC QN AUTO: 23.9 PG (ref 26.8–34.3)
MCHC RBC AUTO-ENTMCNC: 30.4 G/DL (ref 31.4–37.4)
MCV RBC AUTO: 79 FL (ref 82–98)
MONOCYTES # BLD AUTO: 0.82 THOUSAND/ÂΜL (ref 0.17–1.22)
MONOCYTES NFR BLD AUTO: 10 % (ref 4–12)
NEUTROPHILS # BLD AUTO: 3.84 THOUSANDS/ÂΜL (ref 1.85–7.62)
NEUTS SEG NFR BLD AUTO: 44 % (ref 43–75)
NRBC BLD AUTO-RTO: 0 /100 WBCS
PLATELET # BLD AUTO: 235 THOUSANDS/UL (ref 149–390)
PMV BLD AUTO: 9.8 FL (ref 8.9–12.7)
RBC # BLD AUTO: 5.23 MILLION/UL (ref 3.88–5.62)
WBC # BLD AUTO: 8.66 THOUSAND/UL (ref 4.31–10.16)

## 2024-12-24 PROCEDURE — 99232 SBSQ HOSP IP/OBS MODERATE 35: CPT | Performed by: PSYCHIATRY & NEUROLOGY

## 2024-12-24 PROCEDURE — 82550 ASSAY OF CK (CPK): CPT

## 2024-12-24 PROCEDURE — 85025 COMPLETE CBC W/AUTO DIFF WBC: CPT | Performed by: PSYCHIATRY & NEUROLOGY

## 2024-12-24 RX ADMIN — CLOZAPINE 250 MG: 25 TABLET ORAL at 21:22

## 2024-12-24 RX ADMIN — NICOTINE POLACRILEX 2 MG: 2 GUM, CHEWING ORAL at 08:42

## 2024-12-24 RX ADMIN — LORATADINE 10 MG: 10 TABLET ORAL at 08:41

## 2024-12-24 RX ADMIN — GLYCOPYRROLATE 1 MG: 1 TABLET ORAL at 08:40

## 2024-12-24 RX ADMIN — MULTIPLE VITAMINS W/ MINERALS TAB 1 TABLET: TAB ORAL at 08:42

## 2024-12-24 RX ADMIN — DOCUSATE SODIUM 100 MG: 100 CAPSULE, LIQUID FILLED ORAL at 17:07

## 2024-12-24 RX ADMIN — NICOTINE POLACRILEX 2 MG: 2 GUM, CHEWING ORAL at 12:57

## 2024-12-24 RX ADMIN — CEPHALEXIN 500 MG: 500 CAPSULE ORAL at 11:56

## 2024-12-24 RX ADMIN — MELATONIN TAB 3 MG 9 MG: 3 TAB at 21:22

## 2024-12-24 RX ADMIN — PANTOPRAZOLE SODIUM 20 MG: 20 TABLET, DELAYED RELEASE ORAL at 08:42

## 2024-12-24 RX ADMIN — GLYCOPYRROLATE 1 MG: 1 TABLET ORAL at 13:16

## 2024-12-24 RX ADMIN — SENNOSIDES AND DOCUSATE SODIUM 2 TABLET: 50; 8.6 TABLET ORAL at 21:22

## 2024-12-24 RX ADMIN — AMLODIPINE BESYLATE 5 MG: 5 TABLET ORAL at 08:41

## 2024-12-24 RX ADMIN — PROPRANOLOL HYDROCHLORIDE 10 MG: 10 TABLET ORAL at 08:41

## 2024-12-24 RX ADMIN — ESCITALOPRAM OXALATE 20 MG: 10 TABLET, FILM COATED ORAL at 08:42

## 2024-12-24 RX ADMIN — GLYCOPYRROLATE 2 MG: 1 TABLET ORAL at 21:22

## 2024-12-24 RX ADMIN — CEPHALEXIN 500 MG: 500 CAPSULE ORAL at 00:20

## 2024-12-24 RX ADMIN — NICOTINE POLACRILEX 2 MG: 2 GUM, CHEWING ORAL at 21:23

## 2024-12-24 RX ADMIN — CEPHALEXIN 500 MG: 500 CAPSULE ORAL at 06:22

## 2024-12-24 RX ADMIN — DOCUSATE SODIUM 100 MG: 100 CAPSULE, LIQUID FILLED ORAL at 08:43

## 2024-12-24 RX ADMIN — ATROPINE SULFATE 1 DROP: 10 SOLUTION/ DROPS OPHTHALMIC at 21:23

## 2024-12-24 RX ADMIN — HYDROCHLOROTHIAZIDE 12.5 MG: 12.5 TABLET ORAL at 08:41

## 2024-12-24 RX ADMIN — ARIPIPRAZOLE 30 MG: 15 TABLET ORAL at 08:43

## 2024-12-24 RX ADMIN — NICOTINE POLACRILEX 2 MG: 2 GUM, CHEWING ORAL at 17:07

## 2024-12-24 RX ADMIN — CEPHALEXIN 500 MG: 500 CAPSULE ORAL at 17:07

## 2024-12-24 RX ADMIN — PROPRANOLOL HYDROCHLORIDE 10 MG: 10 TABLET ORAL at 21:22

## 2024-12-24 NOTE — ASSESSMENT & PLAN NOTE
Reviewed 12/24/24  Follows with medical team  Continue senna-docusate sodium dose per medical team.

## 2024-12-24 NOTE — ASSESSMENT & PLAN NOTE
Reviewed 12/24/24  Follows with medical  Continue famotidine 20 mg tablet BID per medical team   Continue glycopyrrolate 1 mg tablet BID AM and afternoon per medical team  Continue glycopyrrolate 2 mg tablet before bed per medical team  Continue pantoprazole EC 20 mg tablet every morning per medical team  This is a chronic condition, and is stable unless otherwise noted.

## 2024-12-24 NOTE — ASSESSMENT & PLAN NOTE
Reviewed on 12/24/2024.  Continues to report same threatening voices as before waiting for ACT team funding for discharge otherwise stable  Continue medication as follows:  Clozapine 250 mg QHS for psychosis - increased 12/11/24  To consider further careful titration  CBC w/diff and CK every 2 weeks  CK was increased to 960 on 12/12 and oral hydration encouraged, CK improved to 905 on 12/13/24. Repeat on 12/17/24 decreased to 674 and on 12/20/24 decreased to 448  Labs to be drawn today, repeat  trending down  Continue Abilify 30 mg daily for psychosis as he is resistant to single antipsychotic  Continue Lexapro 20 mg daily for depression and anxiety  Continue Robinul 1 mg BID and 2 mg QHS for sialorrhea from Clozapine  Continue Atropine 1% solution  1 drop sublingually QHS for adverse effects of Clozapine  Continue Propanolol 10 mg BID for anxiety   Continue Melatonin 9 mg QHS for sleep  Continue Senna-Docusate sodium one 50 mg tablet QHS for constipation    Pt remains on dual antipsychotic Tx due to failure on monotherapy   .  S/p ECT treatments.  - ACT team referral was made for him living back with his aunt once MA funding comes through from the [facility]

## 2024-12-24 NOTE — PLAN OF CARE
Problem: Alteration in Thoughts and Perception  Goal: Treatment Goal: Gain control of psychotic behaviors/thinking, reduce/eliminate presenting symptoms and demonstrate improved reality functioning upon discharge  Outcome: Progressing  Goal: Refrain from acting on delusional thinking/internal stimuli  Description: Interventions:  - Monitor patient closely, per order   - Utilize least restrictive measures   - Set reasonable limits, give positive feedback for acceptable   - Administer medications as ordered and monitor of potential side effects  Outcome: Progressing  Goal: Agree to be compliant with medication regime, as prescribed and report medication side effects  Description: Interventions:  - Offer appropriate PRN medication and supervise ingestion; conduct AIMS, as needed   Outcome: Progressing  Goal: Attend and participate in unit activities, including therapeutic, recreational, and educational groups  Description: Interventions:  -Encourage Visitation and family involvement in care  Outcome: Progressing  Goal: Complete daily ADLs, including personal hygiene independently, as able  Description: Interventions:  - Observe, teach, and assist patient with ADLS  - Monitor and promote a balance of rest/activity, with adequate nutrition and elimination   Outcome: Progressing     Problem: Ineffective Coping  Goal: Participates in unit activities  Description: Interventions:  - Provide therapeutic environment   - Provide required programming   - Redirect inappropriate behaviors   Outcome: Progressing  Goal: Patient/Family participate in treatment and DC plans  Description: Interventions:  - Provide therapeutic environment  Outcome: Progressing     Problem: Depression  Goal: Treatment Goal: Demonstrate behavioral control of depressive symptoms, verbalize feelings of improved mood/affect, and adopt new coping skills prior to discharge  Outcome: Progressing  Goal: Verbalize thoughts and feelings  Description:  Interventions:  - Assess and re-assess patient's level of risk   - Engage patient in 1:1 interactions, daily, for a minimum of 15 minutes   - Encourage patient to express feelings, fears, frustrations, hopes   Outcome: Progressing  Goal: Refrain from harming self  Description: Interventions:  - Monitor patient closely, per order   - Supervise medication ingestion, monitor effects and side effects   Outcome: Progressing  Goal: Refrain from isolation  Description: Interventions:  - Develop a trusting relationship   - Encourage socialization   Outcome: Progressing  Goal: Refrain from self-neglect  Outcome: Progressing     Problem: Alteration in Orientation  Goal: Treatment Goal: Demonstrate a reduction of confusion and improved orientation to person, place, time and/or situation upon discharge, according to optimum baseline/ability  Outcome: Progressing  Goal: Interact with staff daily  Description: Interventions:  - Assess and re-assess patient's level of orientation  - Engage patient in 1 on 1 interactions, daily, for a minimum of 15 minutes   - Establish rapport/trust with patient   Outcome: Progressing  Goal: Express concerns related to confused thinking related to:  Description: Interventions:  - Encourage patient to express feelings, fears, frustrations, hopes  - Assign consistent caregivers   - [place]/re-orient patient as needed  - Allow comfort items, as appropriate  - Provide visual cues, signs, etc.   Outcome: Progressing  Goal: Allow medical examinations, as recommended  Description: Interventions:  - Provide physical/neurological exams and/or referrals, per provider   Outcome: Progressing  Goal: Complete daily ADLs, including personal hygiene independently, as able  Description: Interventions:  - Observe, teach, and assist patient with ADLS  Outcome: Progressing     Problem: DISCHARGE PLANNING - CARE MANAGEMENT  Goal: Discharge to post-acute care or home with appropriate resources  Description:  INTERVENTIONS:  - Conduct assessment to determine patient/family and health care team treatment goals, and need for post-acute services based on payer coverage, community resources, and patient preferences, and barriers to discharge  - Address psychosocial, clinical, and financial barriers to discharge as identified in assessment in conjunction with the patient/family and health care team  - Arrange appropriate level of post-acute services according to patient's   needs and preference and payer coverage in collaboration with the physician and health care team  - Communicate with and update the patient/family, physician, and health care team regarding progress on the discharge plan  - Arrange appropriate transportation to post-acute venues  Outcome: Progressing

## 2024-12-24 NOTE — NURSING NOTE
Alberto maintained on ongoing SAFE precaution without incident on this shift. Awake, alert,pleasant and cooperative.   Attended and participated in 7 out of 9 groups today. Continues to be compliant with medication regimen,. On antibiotic therapy Keflex 500mg PO for skin and soft tissue infection until 12/26/24.  Antibiotic continues to be in progress with no adverse reaction noted.  Denies any pain or discomfort at site. Auditory Hallucination  persistently the same, he is coping with the voice by writing music and calling his family for reassurance that all is well with them.  Behavior control..

## 2024-12-24 NOTE — NURSING NOTE
Alberto maintain on ongoing SAFE precaution without incident on this shift. Observed resting in bed, respiration even and unlabored. On antibiotic therapy Keflex 500mg PO for skin and soft tissue infection until 12/26/24.  Antibiotic continues to be in progress with no adverse reaction noted.  Denies any pain or discomfort at site. Continuous Q 15 minutes check implemented thru the night. Schedule lab: CBC & CK  to be obtain in the morning.  No behavioral noted

## 2024-12-24 NOTE — SOCIAL WORK
This writer met with patient for an individual session. The patient reported experiencing continued depression and ongoing hallucinations.This writer discussed coping skills that the patient can utilize to help manage and reduce these symptoms. The patient shared feelings about the holiday season. This writer and patient discussed his upcoming visit with his family and explored ways to prepare for the holiday season.   This writer will continue to work with patient on coping strategies for managing symptoms. This writer will continue to monitor and address the patient's emotional well-being during the holiday season.

## 2024-12-24 NOTE — ASSESSMENT & PLAN NOTE
Reviewed 12/24/24, on nicotine gum as as needed and encouraged smoking cessation  Continue to encourage cessation upon discharge

## 2024-12-24 NOTE — NURSING NOTE
Pt is calm, cooperative and visible on milieu. Pt reports AH, depression and anxiety; denies SI/HI/VH. Compliant with scheduled medications and meals. Social with select peers and staff. No behavioral issues. Q 15 min checks maintained.

## 2024-12-24 NOTE — PROGRESS NOTES
12/24/24 0838   Team Meeting   Meeting Type Daily Rounds   Team Members Present   Team Members Present Physician;Nurse;;Other (Discipline and Name)   Physician Team Member Thomas, Holter   Nursing Team Member Claudia   Social Work Team Member Jose J ORTEGA   Other (Discipline and Name) Wang PCM   Patient/Family Present   Patient Present No   Patient's Family Present No     Groups Participation  6/9.   Patient's compliant with medications. He's appropriate and interacts with his peers. Labs completed. Family visit on 12/26.

## 2024-12-24 NOTE — PROGRESS NOTES
Progress Note - Behavioral Health   Name: Alberto Berumen 29 y.o. male I MRN: [id number]  Unit/Bed#: [facility] 112-02 I Date of Admission: 3/29/2024   Date of Service: 12/24/2024 I Hospital Day: 270     Assessment & Plan  Schizoaffective disorder, bipolar type (HCC)  Reviewed on 12/24/2024.  Continues to report same threatening voices as before waiting for ACT team funding for discharge otherwise stable  Continue medication as follows:  Clozapine 250 mg QHS for psychosis - increased 12/11/24  To consider further careful titration  CBC w/diff and CK every 2 weeks  CK was increased to 960 on 12/12 and oral hydration encouraged, CK improved to 905 on 12/13/24. Repeat on 12/17/24 decreased to 674 and on 12/20/24 decreased to 448  Labs to be drawn today, repeat  trending down  Continue Abilify 30 mg daily for psychosis as he is resistant to single antipsychotic  Continue Lexapro 20 mg daily for depression and anxiety  Continue Robinul 1 mg BID and 2 mg QHS for sialorrhea from Clozapine  Continue Atropine 1% solution  1 drop sublingually QHS for adverse effects of Clozapine  Continue Propanolol 10 mg BID for anxiety   Continue Melatonin 9 mg QHS for sleep  Continue Senna-Docusate sodium one 50 mg tablet QHS for constipation    Pt remains on dual antipsychotic Tx due to failure on monotherapy   .  S/p ECT treatments.  - ACT team referral was made for him living back with his aunt once MA funding comes through from the [facility]  Chronic idiopathic constipation  Reviewed 12/24/24  Follows with medical team  Continue senna-docusate sodium dose per medical team.   GERD (gastroesophageal reflux disease)  Reviewed 12/24/24  Follows with medical  Continue famotidine 20 mg tablet BID per medical team   Continue glycopyrrolate 1 mg tablet BID AM and afternoon per medical team  Continue glycopyrrolate 2 mg tablet before bed per medical team  Continue pantoprazole EC 20 mg tablet every morning per medical team  This is  a chronic condition, and is stable unless otherwise noted.  Tobacco abuse  Reviewed 12/24/24, on nicotine gum as as needed and encouraged smoking cessation  Continue to encourage cessation upon discharge    Elevated hemoglobin A1c  Reviewed 12/24/24  Follows with medical team    Class 2 obesity in adult  Reviewed 12/24/24  Follows with medical team and given dietary counseling and need for exercise    Primary hypertension  Reviewed 12/24/24  Follows with medical team on hydrochlorothiazide and Norvasc      Patient Active Problem List   Diagnosis    GERD (gastroesophageal reflux disease)    Schizoaffective disorder, bipolar type (HCC)    Tobacco abuse    T wave inversion in EKG    Syringoma    Chronic idiopathic constipation    Vitamin B 12 deficiency    Vitamin D deficiency    Confluent and reticulate papillomatosis    Class 2 obesity in adult    Primary hypertension    Elevated hemoglobin A1c    Bilateral lower extremity edema     Review of systems: Still on Keflex for ingrown hair on his groin until 1226 and it did pop, being encouraged to drink plenty of fluids because of increasing CK otherwise unremarkable   assessment  Overall Status: Status quo  certification Statement: The patient will continue to require additional inpatient hospital stay due to ongoing voices that are threatening to mixing lack of response to medications and ECT until eligible for an ACT team to live with his aunt.     Medications:  propranolol 10 mg every 12 hours  for anxiety, Abilify 30 mg mg at bedtime Lexapro 20 mg once a day,  and senna once a day day for constipation clozapine 250 mg at bedtime, Robinul 1 mg twice a day and 2 mg at bedtime for drooling from Clozapine, Colace 100 mg twice a day with MiraLAX daily for constipation from Clozapine    side effects from treatment: None reported  Medication changes   Clozapine increased to 250 mg on 12/11/2024 from 225 mg  medication education  Risks side effects benefits and precautions  of medications discussed with patient and he did verbalize an understanding about risks for metabolic syndrome from being on neuroleptics and risk for tardive dyskinesia etc.   All medications reviewed  Understanding of medications: Has some understanding  Justification for dual anti-psychotics: due to not responding to single antipsychotics    Non-pharmacological treatments  Continue with individual, group, milieu and occupational therapy using recovery principles and psycho-education about accepting illness and the need for treatment.  Waiting for [facility] to fund an ACT team as he lost his MA benefits and to live with aunt in the [facility]  Maintenance ECTs  completed several months ago  Refuses to see a dietician and agrees to do more exercises as he is about 100 lbs overweight   Prolactin level high so Risperdal replaced with Abilify which he has tolerated well so far with prolactin level back to normal  Counseled to take the stool softeners and laxatives regularly  Dietary counseling given to watch diet and to exercise    Safety  Safety and communication plan established to target dynamic risk factors discussed above.    Discharge Plan back to his aunt when [facility] starts funding for ACT program as his medical assistance benefits are no longer available since he is on Medicare and not eligible for medical assistance but his sister is going to help him reapply for MA benefits [facility] as his  believes he may still be eligible for medical assistance    Interval Progress   Note significant changes with same voices that are threatening to kill him and his family when he gets out which has been going on for more than 3 years.  They were never commanding and he continues to believe they are real and he is trying to accept them and learn to live with them.  Still trying to talk with peers and use pending bed, listening to music, go for walks etc. as coping mechanisms.  Understands the need to  drink any fluids to bring CK down.  Reports no suicidal or homicidal thoughts or other psychotic symptoms but still feels some sadness over chronic nature of voices not going away.  Has not been aggressive or agitated or threatening with no need for behavioral PRNs  acceptance by patient: Accepting  Hopefulness in recovery: Living with his aunt  Involved in reintegration process: Talking to siblings and meeting with them  trusting in relationship with psychiatrist: Trusting   Sleep: Good  Appetite: Good  Compliance with Medications: Good  Group attendance: Most of the groups 6/9  Significant events and progress towards goals: Status quo    Mental Status Exam  Appearance: age appropriate, improved grooming, looks older than stated age, overweight, with hair in dreadlocks casually dressed with a clean shaven face, fairly groomed with good eye contact found pacing the hallway with good eye contact well-groomed, found listening to music on the electronic tablet in the dining bautista today when approached  behavior: Pleasant friendly polite well-groomed with good eye contact  speech: normal rate, normal volume, normal pitch  Mood: dysphoric, anxious, continues to report feeling very good because of the voices that are never going away  affect: constricted, inappropriate, mood-congruent, with no good mood reactivity .    thought Process: organized, logical, coherent, goal directed, linear, decreased rate of thoughts, slowing of thoughts, negative thinking, impaired abstract reasoning, concrete  Thought Content: paranoid ideation, some paranoia, grandiose ideas, intrusive thoughts, preoccupied, chronic, continues to report paranoia about people threatening to kill him and his family because of the voices.  He believes ECT has helped so that he is not much in his head.  No other delusions elicited.  No current suicidal or homicidal thoughts and no plans verbalized but today reports having passive death wishes because of voices  with no plans and able to CFS and it has not changed yet  .  No phobias obsessions compulsions or distorted body perceptions reported.   Perceptual Disturbances: Same threatening voices as before not commanding   Hx Risk Factors: chronic psychiatric problems, chronic anxiety symptoms, chronic psychotic symptoms,    Sensorium: Oriented x 3 spheres and situation  Attention and concentration span: Intact  Cognition: recent and remote memory grossly intact  Consciousness: alert and awake  Intellect: appears to be of average intelligence  Insight: intact  Judgement: intact  Motor Activity: no abnormal movements     Vitals  Temp:  [97.7 °F (36.5 °C)-97.8 °F (36.6 °C)] 97.7 °F (36.5 °C)  HR:  [] 100  Resp:  [16-18] 16  BP: (102-129)/(57-75) 129/75  SpO2:  [96 %-99 %] 98 %  No intake or output data in the 24 hours ending 12/24/24 0430          Lab Results:  today   Current Facility-Administered Medications   Medication Dose Route Frequency Provider Last Rate    acetaminophen  650 mg Oral Q4H PRN Jordan C Holter,       acetaminophen  650 mg Oral Q6H PRN HOLLI Lion      aluminum-magnesium hydroxide-simethicone  30 mL Oral Q4H PRN Jordan C Holter,       amLODIPine  5 mg Oral Daily HOLLI Lion      ARIPiprazole  30 mg Oral Daily Bora Rosario MD      Artificial Tears  1 drop Both Eyes Q3H PRN Jordan C Holter, DO      atropine  1 drop Sublingual HS Harjeet Torres, DO      haloperidol lactate  2.5 mg Intramuscular Q4H PRN Max 4/day HOLLI Lion      And    LORazepam  1 mg Intramuscular Q4H PRN Max 4/day HOLLI Lion      And    benztropine  0.5 mg Intramuscular Q4H PRN Max 4/day HOLLI Lion      benztropine  1 mg Intramuscular Q4H PRN Max 6/day Jordan C Holter, DO      haloperidol lactate  5 mg Intramuscular Q4H PRN Max 4/day HOLLI Lion      And    LORazepam  2 mg Intramuscular Q4H PRN Max 4/day HOLLI Lion      And    benztropine  1 mg Intramuscular Q4H PRN Max  4/day HOLLI Lion      benztropine  1 mg Oral Q4H PRN Max 6/day HOLLI Lion      benztropine  1 mg Oral Q4H PRN Max 6/day Ion C Holter, DO      bisacodyl  10 mg Rectal Daily PRN HOLLI Lion      calcium carbonate  500 mg Oral BID PRN Eileen Jensen, HOLLI      cephalexin  500 mg Oral Q6H KAYLIE Pia Mantilla, HOLLI      cloZAPine  250 mg Oral HS Bora Rosario MD      hydrOXYzine HCL  50 mg Oral Q6H PRN Max 4/day [facility] Holter, DO      Or    diphenhydrAMINE  50 mg Intramuscular Q6H PRN [facility] Holter, DO      hydrOXYzine HCL  50 mg Oral Q6H PRN Max 4/day HOLLI Lion      Or    diphenhydrAMINE  50 mg Intramuscular Q6H PRN HOLLI Lion      diphenhydrAMINE-zinc acetate   Topical BID PRN HOLLI Lion      docusate sodium  100 mg Oral BID Jourdan Dickens,       escitalopram  20 mg Oral Daily HOLLI Lion      famotidine  20 mg Oral BID PRN HOLLI Monge      fluticasone  1 spray Each Nare Daily PRN HOLLI Monge      glycopyrrolate  1 mg Oral BID (AM & Afternoon) Bora Rosario MD      glycopyrrolate  2 mg Oral HS Bora Rosario MD      haloperidol  1 mg Oral Q6H PRN HOLLI Lion      haloperidol  2.5 mg Oral Q4H PRN Max 4/day HOLLI Lion      haloperidol  5 mg Oral Q4H PRN Max 4/day HOLLI Lion      hydroCHLOROthiazide  12.5 mg Oral Daily Pia Mantilla, HOLLI      hydrocortisone   Topical 4x Daily PRN HOLLI Lion      hydrOXYzine HCL  100 mg Oral Q6H PRN Max 4/day [facility] Holter, DO      Or    LORazepam  2 mg Intramuscular Q6H PRN [facility] Holter, DO      hydrOXYzine HCL  100 mg Oral Q6H PRN Max 4/day HOLLI Lion      Or    LORazepam  2 mg Intramuscular Q6H PRN HOLLI Lion      hydrOXYzine HCL  25 mg Oral Q6H PRN Max 4/day Jordan C Holter, DO      ibuprofen  600 mg Oral Q8H PRN HOLLI Lion      influenza vaccine  0.5 mL Intramuscular Once Bora Rosario MD      loratadine   10 mg Oral Daily Brina Guillen MD      magnesium hydroxide  30 mL Oral Once Jourdan Dickens, DO      melatonin  3 mg Oral HS PRN Bora Rosario MD      melatonin  9 mg Oral HS Bora Rosario MD      methocarbamol  500 mg Oral Q6H PRN HOLLI Lion      multivitamin-minerals  1 tablet Oral Daily Eileen GonzalezHOLLI evans      nicotine polacrilex  2 mg Oral Q4H PRN Bora Rosario MD      OLANZapine  5 mg Oral Q4H PRN Max 3/day [facility] Holter, DO      Or    OLANZapine  2.5 mg Intramuscular Q4H PRN Max 3/day [facility] Holter, DO      OLANZapine  5 mg Oral Q3H PRN Max 3/day [facility] Holter, DO      Or    OLANZapine  5 mg Intramuscular Q3H PRN Max 3/day [facility] Holter, DO      OLANZapine  2.5 mg Oral Q4H PRN Max 6/day [facility] Holter, DO      ondansetron  4 mg Oral Q6H PRN HOLLI Lion      [facility]      polyethylene glycol  17 g Oral Daily PRN HOLLI Ghotra      polyethylene glycol  17 g Oral Daily Jourdan Dickens, DO      propranolol  10 mg Oral Q12H KAYLIE HOLLI Lion      senna-docusate sodium  1 tablet Oral Daily PRN [facility] Cresencioter, DO      senna-docusate sodium  2 tablet Oral HS Jourdan Dickens, DO      traZODone  50 mg Oral HS PRN HOLLI Lion      white petrolatum-mineral oil   Topical TID PRN HOLLI Lion         Counseling / Coordination of Care: Total floor / unit time spent today 15 minutes. Greater than 50% of total time was spent with the patient and / or family counseling and / or somewhat receptive to supportive listening and teaching positive coping skills to deal with symptom mangement.     Patient's Rights, confidentiality and exceptions to confidentiality, use of automated medical record, Behavioral Health Services staff access to medical record, and consent to treatment reviewed.    This note has been dictated and hence there may be problems with punctuation, spelling and formatting and if anyone has any concerns please  address them to Dr. Rosario   This note is not shared with patient due to potential for making patient's condition worse by knowing the content of the note.  Ms. Hugh occasionally medications occasionally but that she is not happy

## 2024-12-25 PROCEDURE — 99232 SBSQ HOSP IP/OBS MODERATE 35: CPT | Performed by: PSYCHIATRY & NEUROLOGY

## 2024-12-25 RX ADMIN — CEPHALEXIN 500 MG: 500 CAPSULE ORAL at 13:08

## 2024-12-25 RX ADMIN — PANTOPRAZOLE SODIUM 20 MG: 20 TABLET, DELAYED RELEASE ORAL at 08:34

## 2024-12-25 RX ADMIN — NICOTINE POLACRILEX 2 MG: 2 GUM, CHEWING ORAL at 21:27

## 2024-12-25 RX ADMIN — DOCUSATE SODIUM 100 MG: 100 CAPSULE, LIQUID FILLED ORAL at 08:34

## 2024-12-25 RX ADMIN — AMLODIPINE BESYLATE 5 MG: 5 TABLET ORAL at 08:34

## 2024-12-25 RX ADMIN — PROPRANOLOL HYDROCHLORIDE 10 MG: 10 TABLET ORAL at 21:28

## 2024-12-25 RX ADMIN — GLYCOPYRROLATE 1 MG: 1 TABLET ORAL at 13:08

## 2024-12-25 RX ADMIN — LORATADINE 10 MG: 10 TABLET ORAL at 08:35

## 2024-12-25 RX ADMIN — MELATONIN TAB 3 MG 9 MG: 3 TAB at 21:28

## 2024-12-25 RX ADMIN — CEPHALEXIN 500 MG: 500 CAPSULE ORAL at 20:30

## 2024-12-25 RX ADMIN — CEPHALEXIN 500 MG: 500 CAPSULE ORAL at 00:34

## 2024-12-25 RX ADMIN — DOCUSATE SODIUM 100 MG: 100 CAPSULE, LIQUID FILLED ORAL at 17:15

## 2024-12-25 RX ADMIN — ESCITALOPRAM OXALATE 20 MG: 10 TABLET, FILM COATED ORAL at 08:35

## 2024-12-25 RX ADMIN — ATROPINE SULFATE 1 DROP: 10 SOLUTION/ DROPS OPHTHALMIC at 21:27

## 2024-12-25 RX ADMIN — GLYCOPYRROLATE 2 MG: 1 TABLET ORAL at 21:27

## 2024-12-25 RX ADMIN — NICOTINE POLACRILEX 2 MG: 2 GUM, CHEWING ORAL at 17:15

## 2024-12-25 RX ADMIN — CEPHALEXIN 500 MG: 500 CAPSULE ORAL at 06:11

## 2024-12-25 RX ADMIN — PROPRANOLOL HYDROCHLORIDE 10 MG: 10 TABLET ORAL at 08:35

## 2024-12-25 RX ADMIN — ARIPIPRAZOLE 30 MG: 15 TABLET ORAL at 08:34

## 2024-12-25 RX ADMIN — GLYCOPYRROLATE 1 MG: 1 TABLET ORAL at 08:35

## 2024-12-25 RX ADMIN — SENNOSIDES AND DOCUSATE SODIUM 2 TABLET: 50; 8.6 TABLET ORAL at 21:27

## 2024-12-25 RX ADMIN — HYDROCHLOROTHIAZIDE 12.5 MG: 12.5 TABLET ORAL at 08:34

## 2024-12-25 RX ADMIN — MULTIPLE VITAMINS W/ MINERALS TAB 1 TABLET: TAB ORAL at 08:35

## 2024-12-25 RX ADMIN — CLOZAPINE 250 MG: 25 TABLET ORAL at 21:28

## 2024-12-25 NOTE — NURSING NOTE
Patient has been visible on the unit all shift.  Pleasant and cooperative. Appetite fair.  Medication compliant. He verbalized having auditory hallucinations of people going to kill his family and him. Support offered. Denies suicidal ideations. Social with select peers. Sits in the dining room most of the shift. Attends some groups.

## 2024-12-25 NOTE — ASSESSMENT & PLAN NOTE
Reviewed on 12/25/2024.  No significant changes still with same threatening voices and waiting for MA benefits to be reinstated for discharge and trying to learn positive coping skills to deal with the voices that has been there for more than 3 years  Continue medication as follows:  Clozapine 250 mg QHS for psychosis - increased 12/11/24  To consider further careful titration  CBC w/diff and CK every 2 weeks  CK was increased to 960 on 12/12 and oral hydration encouraged, CK improved to 905 on 12/13/24. Repeat on 12/17/24 decreased to 674 and on 12/20/24 decreased to 448  Labs to be drawn today, repeat  trending down  Continue Abilify 30 mg daily for psychosis as he is resistant to single antipsychotic  Continue Lexapro 20 mg daily for depression and anxiety  Continue Robinul 1 mg BID and 2 mg QHS for sialorrhea from Clozapine  Continue Atropine 1% solution  1 drop sublingually QHS for adverse effects of Clozapine  Continue Propanolol 10 mg BID for anxiety   Continue Melatonin 9 mg QHS for sleep  Continue Senna-Docusate sodium one 50 mg tablet QHS for constipation    Pt remains on dual antipsychotic Tx due to failure on monotherapy   .  S/p ECT treatments.  - ACT team referral was made for him living back with his aunt once MA funding comes through from the [facility]

## 2024-12-25 NOTE — PROGRESS NOTES
Progress Note - Behavioral Health   Name: Alberto Berumen 29 y.o. male I MRN: [id number]  Unit/Bed#: [facility] 112-02 I Date of Admission: 3/29/2024   Date of Service: 12/25/2024 I Hospital Day: 271     Assessment & Plan  Schizoaffective disorder, bipolar type (HCC)  Reviewed on 12/25/2024.  No significant changes still with same threatening voices and waiting for MA benefits to be reinstated for discharge and trying to learn positive coping skills to deal with the voices that has been there for more than 3 years  Continue medication as follows:  Clozapine 250 mg QHS for psychosis - increased 12/11/24  To consider further careful titration  CBC w/diff and CK every 2 weeks  CK was increased to 960 on 12/12 and oral hydration encouraged, CK improved to 905 on 12/13/24. Repeat on 12/17/24 decreased to 674 and on 12/20/24 decreased to 448  Labs to be drawn today, repeat  trending down  Continue Abilify 30 mg daily for psychosis as he is resistant to single antipsychotic  Continue Lexapro 20 mg daily for depression and anxiety  Continue Robinul 1 mg BID and 2 mg QHS for sialorrhea from Clozapine  Continue Atropine 1% solution  1 drop sublingually QHS for adverse effects of Clozapine  Continue Propanolol 10 mg BID for anxiety   Continue Melatonin 9 mg QHS for sleep  Continue Senna-Docusate sodium one 50 mg tablet QHS for constipation    Pt remains on dual antipsychotic Tx due to failure on monotherapy   .  S/p ECT treatments.  - ACT team referral was made for him living back with his aunt once MA funding comes through from the [facility]  Chronic idiopathic constipation  Reviewed 12/25/24  Follows with medical team  Continue senna-docusate sodium dose per medical team.   GERD (gastroesophageal reflux disease)  Reviewed 12/25/24  Follows with medical  Continue famotidine 20 mg tablet BID per medical team   Continue glycopyrrolate 1 mg tablet BID AM and afternoon per medical team  Continue glycopyrrolate 2 mg  tablet before bed per medical team  Continue pantoprazole EC 20 mg tablet every morning per medical team  This is a chronic condition, and is stable unless otherwise noted.  Tobacco abuse  Reviewed 12/24/24, on nicotine gum as as needed and encouraged smoking cessation  Continue to encourage cessation upon discharge    Elevated hemoglobin A1c  Reviewed 12/26/24  Follows with medical team    Class 2 obesity in adult  Reviewed 12/25/24  Follows with medical team and given dietary counseling and need for exercise    Primary hypertension  Reviewed 12/25/24  Follows with medical team on hydrochlorothiazide and Norvasc        Patient Active Problem List   Diagnosis    GERD (gastroesophageal reflux disease)    Schizoaffective disorder, bipolar type (HCC)    Tobacco abuse    T wave inversion in EKG    Syringoma    Chronic idiopathic constipation    Vitamin B 12 deficiency    Vitamin D deficiency    Confluent and reticulate papillomatosis    Class 2 obesity in adult    Primary hypertension    Elevated hemoglobin A1c    Bilateral lower extremity edema     Review of systems: Still on Keflex for the ingrown follicle that has popped out.  Otherwise unremarkable   assessment  Overall Status: Status quo  certification Statement: The patient will continue to require additional inpatient hospital stay due to ongoing voices that are threatening to mixing lack of response to medications and ECT until eligible for an ACT team to live with his aunt.     Medications:  propranolol 10 mg every 12 hours  for anxiety, Abilify 30 mg mg at bedtime Lexapro 20 mg once a day,  and senna once a day day for constipation clozapine 250 mg at bedtime, Robinul 1 mg twice a day and 2 mg at bedtime for drooling from Clozapine, Colace 100 mg twice a day with MiraLAX daily for constipation from Clozapine    side effects from treatment: None reported  Medication changes   Clozapine increased to 250 mg on 12/11/2024 from 225 mg  medication education  Risks  side effects benefits and precautions of medications discussed with patient and he did verbalize an understanding about risks for metabolic syndrome from being on neuroleptics and risk for tardive dyskinesia etc.   All medications were reviewed  Understanding of medications: Has some understanding  Justification for dual anti-psychotics: due to not responding to single antipsychotics    Non-pharmacological treatments  Continue with individual, group, milieu and occupational therapy using recovery principles and psycho-education about accepting illness and the need for treatment.  Waiting for [facility] to fund an ACT team as he lost his MA benefits and to live with aunt in the [facility]  Maintenance ECTs  completed several months ago  Refuses to see a dietician and agrees to do more exercises as he is about 100 lbs overweight   Prolactin level high so Risperdal replaced with Abilify which he has tolerated well so far with prolactin level back to normal  Counseled to take the stool softeners and laxatives regularly  Dietary counseling given to watch diet and to exercise    Safety  Safety and communication plan established to target dynamic risk factors discussed above.    Discharge Plan back to his aunt in the [facility] once his MA benefits are reinstated with an ACT team    Interval Progress   Continues to have same symptoms hearing threatening voices but manageable and redirectable.  Still reports some dysphoria over the voices not going away completely but has not had any suicidal thoughts or any other psychotic symptoms per se.  He has been hearing the same voices for more than 3 years now trying to learn to live with them and practice positive coping skills and has been placing the punching bag that is helpful along with talking with peers.  Has not been aggressive or agitated or threatening or self-abusive with no need for behavioral PRNs in the last 24 hours.  Remains polite friendly pleasant when approached with a  flat to constricted affect most of the time    acceptance by patient: Accepting  Hopefulness in recovery: Living with his aunt  Involved in reintegration process: Talking to siblings and meeting with them   trusting in relationship with psychiatrist: Trusting  Sleep: Good  Appetite: Good  Compliance with Medications: Good  Group attendance: Most of the groups  Significant events and progress towards goals: Status quo    Mental Status Exam  Appearance: age appropriate, improved grooming, looks older than stated age, overweight, with hair in dreadlocks casually dressed with a clean shaven face, fairly groomed with good eye contact found pacing the hallway with good eye contact well-groomed, found laying on bed under the covers today when approached  behavior: Pleasant friendly polite well-groomed with good eye contact  speech: normal rate, normal volume, normal pitch  Mood: dysphoric, anxious, continues to report feeling very good because of the voices that are never going away  affect: constricted, inappropriate, mood-congruent, with no good mood reactivity .    thought Process: organized, logical, coherent, goal directed, linear, decreased rate of thoughts, slowing of thoughts, negative thinking, impaired abstract reasoning, concrete  Thought Content: paranoid ideation, some paranoia, grandiose ideas, intrusive thoughts, preoccupied, chronic, continues to report paranoia about people threatening to kill him and his family because of the voices.  He believes ECT has helped so that he is not much in his head.  No other delusions elicited.  No current suicidal or homicidal thoughts and no plans verbalized but today reports having passive death wishes because of voices with no plans and able to CFS and it has not changed yet  .  No phobias obsessions compulsions or distorted body perceptions reported.   Perceptual Disturbances: Same threatening voices as before not commanding   Hx Risk Factors: chronic psychiatric  problems, chronic anxiety symptoms, chronic psychotic symptoms,    Sensorium: Oriented x 3 spheres and situation  Attention and concentration span: Intact  Cognition: recent and remote memory grossly intact  Consciousness: alert and awake  Intellect: appears to be of average intelligence  Insight: intact  Judgement: intact  Motor Activity: no abnormal movements     Vitals  Temp:  [97.3 °F (36.3 °C)-97.6 °F (36.4 °C)] 97.3 °F (36.3 °C)  HR:  [76-94] 76  Resp:  [18] 18  BP: (130-141)/(58-78) 130/58  SpO2:  [97 %-98 %] 98 %  No intake or output data in the 24 hours ending 12/25/24 0928          Lab Results:  today   Current Facility-Administered Medications   Medication Dose Route Frequency Provider Last Rate    acetaminophen  650 mg Oral Q4H PRN Jordan C Holter,       acetaminophen  650 mg Oral Q6H PRN HOLLI Lion      aluminum-magnesium hydroxide-simethicone  30 mL Oral Q4H PRN Jordan C Holter,       amLODIPine  5 mg Oral Daily HLOLI Lion      ARIPiprazole  30 mg Oral Daily Bora Rosario MD      Artificial Tears  1 drop Both Eyes Q3H PRN Jordan C Holter, DO      atropine  1 drop Sublingual  Harjeet Melissa,       haloperidol lactate  2.5 mg Intramuscular Q4H PRN Max 4/day HOLLI Lion      And    LORazepam  1 mg Intramuscular Q4H PRN Max 4/day HOLLI Lion      And    benztropine  0.5 mg Intramuscular Q4H PRN Max 4/day HOLLI Lion      benztropine  1 mg Intramuscular Q4H PRN Max 6/day Jordan C Holter, DO      haloperidol lactate  5 mg Intramuscular Q4H PRN Max 4/day HOLLI Lion      And    LORazepam  2 mg Intramuscular Q4H PRN Max 4/day HOLLI Lion      And    benztropine  1 mg Intramuscular Q4H PRN Max 4/day HOLLI Lion      benztropine  1 mg Oral Q4H PRN Max 6/day HOLLI Lion      benztropine  1 mg Oral Q4H PRN Max 6/day Jordan C Holter,       bisacodyl  10 mg Rectal Daily PRN HOLLI Lion      calcium carbonate  500 mg Oral BID PRN  Eileen Jensen, HOLLI      cephalexin  500 mg Oral Q6H KAYLIE Pia Mantilla, CRNP      cloZAPine  250 mg Oral HS oBra Rosario MD      hydrOXYzine HCL  50 mg Oral Q6H PRN Max 4/day Ion FISCHER Holter, DO      Or    diphenhydrAMINE  50 mg Intramuscular Q6H PRN Ion FISCHER Holter, DO      hydrOXYzine HCL  50 mg Oral Q6H PRN Max 4/day HOLLI Lion      Or    diphenhydrAMINE  50 mg Intramuscular Q6H PRN Eveline Hunt, STEVENP      diphenhydrAMINE-zinc acetate   Topical BID PRN Eveline Hunt, HOLLI      docusate sodium  100 mg Oral BID Jourdan Dickens, DO      escitalopram  20 mg Oral Daily HOLLI Lion      famotidine  20 mg Oral BID PRN HOLLI Monge      fluticasone  1 spray Each Nare Daily PRN HOLLI Monge      glycopyrrolate  1 mg Oral BID (AM & Afternoon) Bora Rosario MD      glycopyrrolate  2 mg Oral HS Bora Rosario MD      haloperidol  1 mg Oral Q6H PRN HOLLI Lion      haloperidol  2.5 mg Oral Q4H PRN Max 4/day HOLLI Lion      haloperidol  5 mg Oral Q4H PRN Max 4/day HOLLI Lion      hydroCHLOROthiazide  12.5 mg Oral Daily Pia Mantilla, CRJENNIE      hydrocortisone   Topical 4x Daily PRN HOLLI Lion      hydrOXYzine HCL  100 mg Oral Q6H PRN Max 4/day Ion FISCHER Holter, DO      Or    LORazepam  2 mg Intramuscular Q6H PRN Ion FISCHER Holter, DO      hydrOXYzine HCL  100 mg Oral Q6H PRN Max 4/day HOLLI Lion      Or    LORazepam  2 mg Intramuscular Q6H PRN HOLLI Lion      hydrOXYzine HCL  25 mg Oral Q6H PRN Max 4/day Ion FISCHER Holter, DO      ibuprofen  600 mg Oral Q8H PRN HOLLI Lion      influenza vaccine  0.5 mL Intramuscular Once Bora Rosario MD      loratadine  10 mg Oral Daily Brina Guillen MD      magnesium hydroxide  30 mL Oral Once Jourdan Dickens, DO      melatonin  3 mg Oral HS PRN Bora Rosario MD      melatonin  9 mg Oral HS Bora Rosario MD      methocarbamol  500 mg Oral Q6H PRN HOLLI Lion       multivitamin-minerals  1 tablet Oral Daily Eileenaddie Cherrysathish Jensen, HOLLI      nicotine polacrilex  2 mg Oral Q4H PRN Bora Rosario MD      OLANZapine  5 mg Oral Q4H PRN Max 3/day [facility] Holter, DO      Or    OLANZapine  2.5 mg Intramuscular Q4H PRN Max 3/day [facility] Holter, DO      OLANZapine  5 mg Oral Q3H PRN Max 3/day [facility] Holter, DO      Or    OLANZapine  5 mg Intramuscular Q3H PRN Max 3/day [facility] Holter, DO      OLANZapine  2.5 mg Oral Q4H PRN Max 6/day [facility] Holter, DO      ondansetron  4 mg Oral Q6H PRN HOLLI Lion      pantoprazole  20 mg Oral QAM Jordan C Holter, DO      polyethylene glycol  17 g Oral Daily PRN HOLLI Ghotra      polyethylene glycol  17 g Oral Daily Jourdan Dickens, DO      propranolol  10 mg Oral Q12H KAYLIE HOLLI Lion      senna-docusate sodium  1 tablet Oral Daily PRN [facility] Holter, DO      senna-docusate sodium  2 tablet Oral HS Jourdan Dickens, DO      traZODone  50 mg Oral HS PRN HOLLI Lion      white petrolatum-mineral oil   Topical TID PRN HOLLI Lion         Counseling / Coordination of Care: Total floor / unit time spent today 15 minutes. Greater than 50% of total time was spent with the patient and / or family counseling and / or somewhat receptive to supportive listening and teaching positive coping skills to deal with symptom mangement.     Patient's Rights, confidentiality and exceptions to confidentiality, use of automated medical record, Behavioral Health Services staff access to medical record, and consent to treatment reviewed.    This note has been dictated and hence there may be problems with punctuation, spelling and formatting and if anyone has any concerns please address them to Dr. Rosario   This note is not shared with patient due to potential for making patient's condition worse by knowing the content of the note.  Ms. Reese occasionally medications occasionally but that she is not happy

## 2024-12-25 NOTE — ASSESSMENT & PLAN NOTE
Reviewed 12/25/24  Follows with medical  Continue famotidine 20 mg tablet BID per medical team   Continue glycopyrrolate 1 mg tablet BID AM and afternoon per medical team  Continue glycopyrrolate 2 mg tablet before bed per medical team  Continue pantoprazole EC 20 mg tablet every morning per medical team  This is a chronic condition, and is stable unless otherwise noted.

## 2024-12-25 NOTE — PLAN OF CARE
Problem: Alteration in Thoughts and Perception  Goal: Treatment Goal: Gain control of psychotic behaviors/thinking, reduce/eliminate presenting symptoms and demonstrate improved reality functioning upon discharge  Outcome: Progressing  Goal: Refrain from acting on delusional thinking/internal stimuli  Description: Interventions:  - Monitor patient closely, per order   - Utilize least restrictive measures   - Set reasonable limits, give positive feedback for acceptable   - Administer medications as ordered and monitor of potential side effects  Outcome: Progressing  Goal: Agree to be compliant with medication regime, as prescribed and report medication side effects  Description: Interventions:  - Offer appropriate PRN medication and supervise ingestion; conduct AIMS, as needed   Outcome: Progressing  Goal: Attend and participate in unit activities, including therapeutic, recreational, and educational groups  Description: Interventions:  -Encourage Visitation and family involvement in care  Outcome: Progressing  Goal: Recognize dysfunctional thoughts, communicate reality-based thoughts at the time of discharge  Description: Interventions:  - Provide medication and psycho-education to assist patient in compliance and developing insight into his/her illness   Outcome: Progressing  Goal: Complete daily ADLs, including personal hygiene independently, as able  Description: Interventions:  - Observe, teach, and assist patient with ADLS  - Monitor and promote a balance of rest/activity, with adequate nutrition and elimination   Outcome: Progressing     Problem: Ineffective Coping  Goal: Participates in unit activities  Description: Interventions:  - Provide therapeutic environment   - Provide required programming   - Redirect inappropriate behaviors   Outcome: Progressing  Goal: Patient/Family participate in treatment and DC plans  Description: Interventions:  - Provide therapeutic environment  Outcome: Progressing  Goal:  Patient/Family verbalizes awareness of resources  Outcome: Progressing     Problem: Depression  Goal: Treatment Goal: Demonstrate behavioral control of depressive symptoms, verbalize feelings of improved mood/affect, and adopt new coping skills prior to discharge  Outcome: Progressing  Goal: Verbalize thoughts and feelings  Description: Interventions:  - Assess and re-assess patient's level of risk   - Engage patient in 1:1 interactions, daily, for a minimum of 15 minutes   - Encourage patient to express feelings, fears, frustrations, hopes   Outcome: Progressing  Goal: Refrain from harming self  Description: Interventions:  - Monitor patient closely, per order   - Supervise medication ingestion, monitor effects and side effects   Outcome: Progressing  Goal: Refrain from isolation  Description: Interventions:  - Develop a trusting relationship   - Encourage socialization   Outcome: Progressing  Goal: Refrain from self-neglect  Outcome: Progressing  Goal: Attend and participate in unit activities, including therapeutic, recreational, and educational groups  Description: Interventions:  - Provide therapeutic and educational activities daily, encourage attendance and participation, and document same in the medical record   Outcome: Progressing     Problem: Anxiety  Goal: Anxiety is at manageable level  Description: Interventions:  - Assess and monitor patient's anxiety level.   - Monitor for signs and symptoms (heart palpitations, chest pain, shortness of breath, headaches, nausea, feeling jumpy, restlessness, irritable, apprehensive).   - Collaborate with interdisciplinary team and initiate plan and interventions as ordered.  - [place] patient to unit/surroundings  - Explain treatment plan  - Encourage participation in care  - Encourage verbalization of concerns/fears  - Identify coping mechanisms  - Assist in developing anxiety-reducing skills  - Administer/offer alternative therapies  - Limit or eliminate  stimulants  Outcome: Progressing     Problem: Alteration in Orientation  Goal: Treatment Goal: Demonstrate a reduction of confusion and improved orientation to person, place, time and/or situation upon discharge, according to optimum baseline/ability  Outcome: Progressing  Goal: Interact with staff daily  Description: Interventions:  - Assess and re-assess patient's level of orientation  - Engage patient in 1 on 1 interactions, daily, for a minimum of 15 minutes   - Establish rapport/trust with patient   Outcome: Progressing  Goal: Complete daily ADLs, including personal hygiene independently, as able  Description: Interventions:  - Observe, teach, and assist patient with ADLS  Outcome: Progressing

## 2024-12-25 NOTE — NURSING NOTE
Alberto appeared to be asleep when Q 15 minute checks were being completed. Respirations easy and non labored. Compliant with taking Keflex at 0030. No issues or behaviors. Continue to monitor. Precautions maintained.

## 2024-12-25 NOTE — NURSING NOTE
Alberto has been visible during the entire shift.  Interacting appropriately with peers and staff.  Admitted to AH/depression/anxiety in this shift.  Med, meal and group compliant.  No behavioral issues or any unmet needs.

## 2024-12-25 NOTE — ASSESSMENT & PLAN NOTE
Reviewed 12/25/24  Follows with medical team  Continue senna-docusate sodium dose per medical team.

## 2024-12-26 PROCEDURE — 99232 SBSQ HOSP IP/OBS MODERATE 35: CPT | Performed by: PSYCHIATRY & NEUROLOGY

## 2024-12-26 RX ADMIN — MULTIPLE VITAMINS W/ MINERALS TAB 1 TABLET: TAB ORAL at 08:59

## 2024-12-26 RX ADMIN — NICOTINE POLACRILEX 2 MG: 2 GUM, CHEWING ORAL at 21:15

## 2024-12-26 RX ADMIN — NICOTINE POLACRILEX 2 MG: 2 GUM, CHEWING ORAL at 17:23

## 2024-12-26 RX ADMIN — SENNOSIDES AND DOCUSATE SODIUM 2 TABLET: 50; 8.6 TABLET ORAL at 21:16

## 2024-12-26 RX ADMIN — GLYCOPYRROLATE 2 MG: 1 TABLET ORAL at 21:15

## 2024-12-26 RX ADMIN — GLYCOPYRROLATE 1 MG: 1 TABLET ORAL at 09:00

## 2024-12-26 RX ADMIN — HYDROCHLOROTHIAZIDE 12.5 MG: 12.5 TABLET ORAL at 08:58

## 2024-12-26 RX ADMIN — CEPHALEXIN 500 MG: 500 CAPSULE ORAL at 08:59

## 2024-12-26 RX ADMIN — NICOTINE POLACRILEX 2 MG: 2 GUM, CHEWING ORAL at 08:59

## 2024-12-26 RX ADMIN — PROPRANOLOL HYDROCHLORIDE 10 MG: 10 TABLET ORAL at 09:01

## 2024-12-26 RX ADMIN — ATROPINE SULFATE 1 DROP: 10 SOLUTION/ DROPS OPHTHALMIC at 21:15

## 2024-12-26 RX ADMIN — NICOTINE POLACRILEX 2 MG: 2 GUM, CHEWING ORAL at 13:02

## 2024-12-26 RX ADMIN — ARIPIPRAZOLE 30 MG: 15 TABLET ORAL at 09:00

## 2024-12-26 RX ADMIN — DOCUSATE SODIUM 100 MG: 100 CAPSULE, LIQUID FILLED ORAL at 17:23

## 2024-12-26 RX ADMIN — MELATONIN TAB 3 MG 9 MG: 3 TAB at 21:15

## 2024-12-26 RX ADMIN — PANTOPRAZOLE SODIUM 20 MG: 20 TABLET, DELAYED RELEASE ORAL at 09:00

## 2024-12-26 RX ADMIN — CLOZAPINE 250 MG: 25 TABLET ORAL at 21:15

## 2024-12-26 RX ADMIN — ESCITALOPRAM OXALATE 20 MG: 10 TABLET, FILM COATED ORAL at 09:01

## 2024-12-26 RX ADMIN — DOCUSATE SODIUM 100 MG: 100 CAPSULE, LIQUID FILLED ORAL at 09:00

## 2024-12-26 RX ADMIN — LORATADINE 10 MG: 10 TABLET ORAL at 08:59

## 2024-12-26 RX ADMIN — PROPRANOLOL HYDROCHLORIDE 10 MG: 10 TABLET ORAL at 21:16

## 2024-12-26 RX ADMIN — CEPHALEXIN 500 MG: 500 CAPSULE ORAL at 02:28

## 2024-12-26 RX ADMIN — GLYCOPYRROLATE 1 MG: 1 TABLET ORAL at 14:11

## 2024-12-26 NOTE — NURSING NOTE
Received pt in bed at change of shift with eyes closed; chest movement noted.  Continues the same thus this far as per q 15 min room checks.   Will continue to monitor behavior, sleeping pattern and any medical issues that may arise.    0555:  Sleeping 7+ hrs thus this far

## 2024-12-26 NOTE — ASSESSMENT & PLAN NOTE
Reviewed 12/26/24  Follows with medical  Continue famotidine 20 mg tablet BID per medical team   Continue glycopyrrolate 1 mg tablet BID AM and afternoon per medical team  Continue glycopyrrolate 2 mg tablet before bed per medical team  Continue pantoprazole EC 20 mg tablet every morning per medical team  This is a chronic condition, and is stable unless otherwise noted.

## 2024-12-26 NOTE — ASSESSMENT & PLAN NOTE
Reviewed 12/26/24, on nicotine gum as as needed and encouraged smoking cessation  Continue to encourage cessation upon discharge

## 2024-12-26 NOTE — NURSING NOTE
Patient has been visible on the unit most of the shift. Social with select peers. Smiling when approached. Appetite good. Medication compliant. Denies any suicidal ideations. Continues to have auditory hallucinations telling him they are going to harm himself and his family. Redirected. Support offered. Pleasant and cooperative. Attends more than half of the groups. Participated in walking group and electronics group today.

## 2024-12-26 NOTE — PROGRESS NOTES
Progress Note - Behavioral Health   Name: Alberto Berumen 29 y.o. male I MRN: [id number]  Unit/Bed#: [facility] 112-02 I Date of Admission: 3/29/2024   Date of Service: 12/26/2024 I Hospital Day: 272     Assessment & Plan  Schizoaffective disorder, bipolar type (HCC)  Reviewed on 12/26/2024.  Status quo hearing same voices threatening to harm him and his family which has been going on for over 3 years and trying to learn to live with them  Continue medication as follows:  Clozapine 250 mg QHS for psychosis - increased 12/11/24  To consider further careful titration  CBC w/diff and CK every 2 weeks  CK was increased to 960 on 12/12 and oral hydration encouraged, CK improved to 905 on 12/13/24. Repeat on 12/17/24 decreased to 674 and on 12/20/24 decreased to 448  Labs  drawn on 12/24/2024, repeat  trending down  Continue Abilify 30 mg daily for psychosis as he is resistant to single antipsychotic  Continue Lexapro 20 mg daily for depression and anxiety  Continue Robinul 1 mg BID and 2 mg QHS for sialorrhea from Clozapine  Continue Atropine 1% solution  1 drop sublingually QHS for adverse effects of Clozapine  Continue Propanolol 10 mg BID for anxiety   Continue Melatonin 9 mg QHS for sleep  Continue Senna-Docusate sodium one 50 mg tablet QHS for constipation    Pt remains on dual antipsychotic Tx due to failure on monotherapy   .  S/p ECT treatments.  - ACT team referral was made for him living back with his aunt once MA funding comes through from the [facility]  Chronic idiopathic constipation  Reviewed 12/26/24  Follows with medical team  Continue senna-docusate sodium dose per medical team.   GERD (gastroesophageal reflux disease)  Reviewed 12/26/24  Follows with medical  Continue famotidine 20 mg tablet BID per medical team   Continue glycopyrrolate 1 mg tablet BID AM and afternoon per medical team  Continue glycopyrrolate 2 mg tablet before bed per medical team  Continue pantoprazole EC 20 mg tablet  every morning per medical team  This is a chronic condition, and is stable unless otherwise noted.  Tobacco abuse  Reviewed 12/26/24, on nicotine gum as as needed and encouraged smoking cessation  Continue to encourage cessation upon discharge    Elevated hemoglobin A1c  Reviewed 12/26/24  Follows with medical team    Class 2 obesity in adult  Reviewed 12/26/24  Follows with medical team and given dietary counseling and need for exercise    Primary hypertension  Reviewed 12/26/24  Follows with medical team on hydrochlorothiazide and Norvasc      Patient Active Problem List   Diagnosis    GERD (gastroesophageal reflux disease)    Schizoaffective disorder, bipolar type (HCC)    Tobacco abuse    T wave inversion in EKG    Syringoma    Chronic idiopathic constipation    Vitamin B 12 deficiency    Vitamin D deficiency    Confluent and reticulate papillomatosis    Class 2 obesity in adult    Primary hypertension    Elevated hemoglobin A1c    Bilateral lower extremity edema     Review of systems: Completed a course of Keflex this morning for the ingrown follicle that has popped out.  Otherwise unremarkable   assessment  Overall Status: No significant changes  certification Statement: The patient will continue to require additional inpatient hospital stay due to ongoing voices that are threatening to mixing lack of response to medications and ECT until eligible for an ACT team to live with his aunt.     Medications:  propranolol 10 mg every 12 hours  for anxiety, Abilify 30 mg mg at bedtime Lexapro 20 mg once a day,  and senna once a day day for constipation clozapine 250 mg at bedtime, Robinul 1 mg twice a day and 2 mg at bedtime for drooling from Clozapine, Colace 100 mg twice a day with MiraLAX daily for constipation from Clozapine    side effects from treatment: None reported  Medication changes   Clozapine increased to 250 mg on 12/11/2024 from 225 mg  medication education  Risks side effects benefits and precautions  of medications discussed with patient and he did verbalize an understanding about risks for metabolic syndrome from being on neuroleptics and risk for tardive dyskinesia etc.   All medications reviewed  Understanding of medications: Has some understanding  Justification for dual anti-psychotics: due to not responding to single antipsychotics    Non-pharmacological treatments  Continue with individual, group, milieu and occupational therapy using recovery principles and psycho-education about accepting illness and the need for treatment.  Waiting for [facility] to fund an ACT team as he lost his MA benefits and to live with aunt in the [facility]  Maintenance ECTs  completed several months ago  Refuses to see a dietician and agrees to do more exercises as he is about 100 lbs overweight   Prolactin level high so Risperdal replaced with Abilify which he has tolerated well so far with prolactin level back to normal  Counseled to take the stool softeners and laxatives regularly  Dietary counseling given to watch diet and to exercise    Safety  Safety and communication plan established to target dynamic risk factors discussed above.    Discharge Plan back to his aunt in the [facility] once his MA benefits are reinstated with an ACT team    Interval Progress   Continues to have same threatening voices to harm him and his family but he has been hearing them for over 3 years and he is trying to learn to live with them.  Still somewhat dysphoric over the chronic voices not going away.  Still pacing back and forth hoping to meet with sister today on the unit.  Using the punching bag and talking to peers as coping mechanisms and has not been aggressive or agitated or self-abusive on the unit with no need for behavioral PRNs.  Remains polite friendly pleasant when approached.  He tells me that his sister is not coming today as planned and he will be rescheduled    acceptance by patient: Accepting  Hopefulness in recovery: Living with his  aunt again  Involved in reintegration process: Talking to siblings and meeting with sister    trusting in relationship with psychiatrist: Trusting  Sleep: Good  Appetite: Good  Compliance with Medications: Good  Group attendance: Most of the groups 9/11 last Tuesday  Significant events and progress towards goals: Status quo    Mental Status Exam  Appearance: age appropriate, improved grooming, looks older than stated age, overweight, with hair in dreadlocks casually dressed with a clean shaven face, fairly groomed with good eye contact found pacing the hallway with good eye contact well-groomed, found laying on bed under the covers today when approached  behavior: Pleasant and polite friendly well-groomed with good eye contact  speech: normal rate, normal volume, normal pitch  Mood: dysphoric, anxious, continues to report feeling very good because of the voices that are never going away  affect: constricted, inappropriate, mood-congruent, with no good mood reactivity .    thought Process: organized, logical, coherent, goal directed, linear, decreased rate of thoughts, slowing of thoughts, negative thinking, impaired abstract reasoning, concrete  Thought Content: paranoid ideation, some paranoia, grandiose ideas, intrusive thoughts, preoccupied, chronic, continues to report paranoia about people threatening to kill him and his family because of the voices.  He believes ECT has helped so that he is not much in his head.  No other delusions elicited.  No current suicidal or homicidal thoughts and no plans verbalized but today reports having passive death wishes because of voices with no plans and able to CFS and it has not changed yet  .  No phobias obsessions compulsions or distorted body perceptions reported.   Perceptual Disturbances: Same threatening voices not commanding   Hx Risk Factors: chronic psychiatric problems, chronic anxiety symptoms, chronic psychotic symptoms,    Sensorium: Oriented x 3 spheres and  situation  Attention and concentration span: Intact  Cognition: recent and remote memory grossly intact  Consciousness: alert and awake  Intellect: appears to be of average intelligence  Insight: intact  Judgement: intact  Motor Activity: no abnormal movements     Vitals  Temp:  [97.3 °F (36.3 °C)-97.7 °F (36.5 °C)] 97.7 °F (36.5 °C)  HR:  [76-88] 80  Resp:  [18] 18  BP: (118-134)/(58-76) 118/76  SpO2:  [98 %-100 %] 98 %  No intake or output data in the 24 hours ending 12/26/24 0739          Lab Results:  today   Current Facility-Administered Medications   Medication Dose Route Frequency Provider Last Rate    acetaminophen  650 mg Oral Q4H PRN Jordan C Holter, DO      acetaminophen  650 mg Oral Q6H PRN HOLLI Lion      aluminum-magnesium hydroxide-simethicone  30 mL Oral Q4H PRN Jordan C Holter, DO      amLODIPine  5 mg Oral Daily STEVE LionNP      ARIPiprazole  30 mg Oral Daily Bora Rosario MD      Artificial Tears  1 drop Both Eyes Q3H PRN Jordan C Holter, DO      atropine  1 drop Sublingual HS Harjeet Torres, DO      haloperidol lactate  2.5 mg Intramuscular Q4H PRN Max 4/day STEVE LionNP      And    LORazepam  1 mg Intramuscular Q4H PRN Max 4/day HOLLI Lion      And    benztropine  0.5 mg Intramuscular Q4H PRN Max 4/day HOLLI Lion      benztropine  1 mg Intramuscular Q4H PRN Max 6/day Jordan C Holter, DO      haloperidol lactate  5 mg Intramuscular Q4H PRN Max 4/day STEVE LionNP      And    LORazepam  2 mg Intramuscular Q4H PRN Max 4/day HOLLI Lion      And    benztropine  1 mg Intramuscular Q4H PRN Max 4/day HOLLI Lion      benztropine  1 mg Oral Q4H PRN Max 6/day HOLLI Lion      benztropine  1 mg Oral Q4H PRN Max 6/day Jordan C Holter, DO      bisacodyl  10 mg Rectal Daily PRN HOLLI Lion      calcium carbonate  500 mg Oral BID PRN HOLLI Monge      cephalexin  500 mg Oral Q6H [facility] HOLLI Galvan       cloZAPine  250 mg Oral HS Bora Rosario MD      hydrOXYzine HCL  50 mg Oral Q6H PRN Max 4/day Ion Dupontter, DO      Or    diphenhydrAMINE  50 mg Intramuscular Q6H PRN Jordan C Holter, DO      hydrOXYzine HCL  50 mg Oral Q6H PRN Max 4/day HOLLI Lion      Or    diphenhydrAMINE  50 mg Intramuscular Q6H PRN HOLLI Lion      diphenhydrAMINE-zinc acetate   Topical BID PRN HOLLI Lion      docusate sodium  100 mg Oral BID Jourdan Dickens, DO      escitalopram  20 mg Oral Daily HOLLI Lion      famotidine  20 mg Oral BID PRN HOLLI Monge      fluticasone  1 spray Each Nare Daily PRN HOLLI Monge      glycopyrrolate  1 mg Oral BID (AM & Afternoon) Bora Rosario MD      glycopyrrolate  2 mg Oral HS Bora Rosario MD      haloperidol  1 mg Oral Q6H PRN HOLLI Lion      haloperidol  2.5 mg Oral Q4H PRN Max 4/day HOLLI Lion      haloperidol  5 mg Oral Q4H PRN Max 4/day HOLLI Lion      hydroCHLOROthiazide  12.5 mg Oral Daily HOLLI Galvan      hydrocortisone   Topical 4x Daily PRN HOLLI Lion      hydrOXYzine HCL  100 mg Oral Q6H PRN Max 4/day Ion FISCHER Holter, DO      Or    LORazepam  2 mg Intramuscular Q6H PRN Jordan C Holter, DO      hydrOXYzine HCL  100 mg Oral Q6H PRN Max 4/day HOLLI Lion      Or    LORazepam  2 mg Intramuscular Q6H PRN HOLLI Lion      hydrOXYzine HCL  25 mg Oral Q6H PRN Max 4/day Ion FISCHER Holter, DO      ibuprofen  600 mg Oral Q8H PRN HOLLI Lion      influenza vaccine  0.5 mL Intramuscular Once Bora Rosario MD      loratadine  10 mg Oral Daily Brina Guillen MD      magnesium hydroxide  30 mL Oral Once Jourdan Dickens, DO      melatonin  3 mg Oral HS PRN Bora Rosario MD      melatonin  9 mg Oral HS Bora Rosario MD      methocarbamol  500 mg Oral Q6H PRN Eveline Hangey, CRNP      multivitamin-minerals  1 tablet Oral Daily HOLLI Monge      nicotine polacrilex  2  mg Oral Q4H PRN Bora Rosario MD      OLANZapine  5 mg Oral Q4H PRN Max 3/day [facility] Holter, DO      Or    OLANZapine  2.5 mg Intramuscular Q4H PRN Max 3/day [facility] Holter, DO      OLANZapine  5 mg Oral Q3H PRN Max 3/day [facility] Holter, DO      Or    OLANZapine  5 mg Intramuscular Q3H PRN Max 3/day [facility] Holter, DO      OLANZapine  2.5 mg Oral Q4H PRN Max 6/day [facility] Holter, DO      ondansetron  4 mg Oral Q6H PRN HOLLI Lion      pantoprazole  20 mg Oral QAM [facility] Holter, DO      polyethylene glycol  17 g Oral Daily PRN Sofi Yoo, HOLLI      polyethylene glycol  17 g Oral Daily Jourdan Dickens, DO      propranolol  10 mg Oral Q12H KAYLIE HOLLI Lion      senna-docusate sodium  1 tablet Oral Daily PRN [facility] Cresencioter, DO      senna-docusate sodium  2 tablet Oral HS Jourdan Dickens, DO      traZODone  50 mg Oral HS PRN HOLLI Lion      white petrolatum-mineral oil   Topical TID PRN HOLLI Lion         Counseling / Coordination of Care: Total floor / unit time spent today 15 minutes. Greater than 50% of total time was spent with the patient and / or family counseling and / or somewhat receptive to supportive listening and teaching positive coping skills to deal with symptom mangement.     Patient's Rights, confidentiality and exceptions to confidentiality, use of automated medical record, Behavioral Health Services staff access to medical record, and consent to treatment reviewed.    This note has been dictated and hence there may be problems with punctuation, spelling and formatting and if anyone has any concerns please address them to Dr. Rosario   This note is not shared with patient due to potential for making patient's condition worse by knowing the content of the note.  Ms. Reese occasionally medications occasionally but that she is not happy

## 2024-12-26 NOTE — NURSING NOTE
Pt was asleep when this nurse entered the room for med administration.  Pt admitted to feeling depressed and anxious.  Also expressed having AH of voices telling him that they are going to kill him and his family.    Pt refused his Miralax this morning, but asked for Nicotine gum.  Amlodipine was held d/t parameters not being met, however, he was able to receive the propranolol.  Pt's request to have his brother and sister n law visit has been approved.  Time is for 1100 tomorrow.

## 2024-12-26 NOTE — PLAN OF CARE
Problem: Alteration in Thoughts and Perception  Goal: Treatment Goal: Gain control of psychotic behaviors/thinking, reduce/eliminate presenting symptoms and demonstrate improved reality functioning upon discharge  Outcome: Progressing  Goal: Refrain from acting on delusional thinking/internal stimuli  Description: Interventions:  - Monitor patient closely, per order   - Utilize least restrictive measures   - Set reasonable limits, give positive feedback for acceptable   - Administer medications as ordered and monitor of potential side effects  Outcome: Progressing  Goal: Agree to be compliant with medication regime, as prescribed and report medication side effects  Description: Interventions:  - Offer appropriate PRN medication and supervise ingestion; conduct AIMS, as needed   Outcome: Progressing  Goal: Attend and participate in unit activities, including therapeutic, recreational, and educational groups  Description: Interventions:  -Encourage Visitation and family involvement in care  Outcome: Progressing  Goal: Complete daily ADLs, including personal hygiene independently, as able  Description: Interventions:  - Observe, teach, and assist patient with ADLS  - Monitor and promote a balance of rest/activity, with adequate nutrition and elimination   Outcome: Progressing     Problem: Ineffective Coping  Goal: Participates in unit activities  Description: Interventions:  - Provide therapeutic environment   - Provide required programming   - Redirect inappropriate behaviors   Outcome: Progressing  Goal: Patient/Family participate in treatment and DC plans  Description: Interventions:  - Provide therapeutic environment  Outcome: Progressing     Problem: Depression  Goal: Treatment Goal: Demonstrate behavioral control of depressive symptoms, verbalize feelings of improved mood/affect, and adopt new coping skills prior to discharge  Outcome: Progressing  Goal: Verbalize thoughts and feelings  Description:  Interventions:  - Assess and re-assess patient's level of risk   - Engage patient in 1:1 interactions, daily, for a minimum of 15 minutes   - Encourage patient to express feelings, fears, frustrations, hopes   Outcome: Progressing  Goal: Refrain from harming self  Description: Interventions:  - Monitor patient closely, per order   - Supervise medication ingestion, monitor effects and side effects   Outcome: Progressing  Goal: Refrain from isolation  Description: Interventions:  - Develop a trusting relationship   - Encourage socialization   Outcome: Progressing  Goal: Refrain from self-neglect  Outcome: Progressing  Goal: Attend and participate in unit activities, including therapeutic, recreational, and educational groups  Description: Interventions:  - Provide therapeutic and educational activities daily, encourage attendance and participation, and document same in the medical record   Outcome: Progressing     Problem: Anxiety  Goal: Anxiety is at manageable level  Description: Interventions:  - Assess and monitor patient's anxiety level.   - Monitor for signs and symptoms (heart palpitations, chest pain, shortness of breath, headaches, nausea, feeling jumpy, restlessness, irritable, apprehensive).   - Collaborate with interdisciplinary team and initiate plan and interventions as ordered.  - [place] patient to unit/surroundings  - Explain treatment plan  - Encourage participation in care  - Encourage verbalization of concerns/fears  - Identify coping mechanisms  - Assist in developing anxiety-reducing skills  - Administer/offer alternative therapies  - Limit or eliminate stimulants  Outcome: Progressing     Problem: Alteration in Orientation  Goal: Treatment Goal: Demonstrate a reduction of confusion and improved orientation to person, place, time and/or situation upon discharge, according to optimum baseline/ability  Outcome: Progressing  Goal: Interact with staff daily  Description: Interventions:  - Assess and  re-assess patient's level of orientation  - Engage patient in 1 on 1 interactions, daily, for a minimum of 15 minutes   - Establish rapport/trust with patient   Outcome: Progressing  Goal: Express concerns related to confused thinking related to:  Description: Interventions:  - Encourage patient to express feelings, fears, frustrations, hopes  - Assign consistent caregivers   - Orange/re-orient patient as needed  - Allow comfort items, as appropriate  - Provide visual cues, signs, etc.   Outcome: Progressing  Goal: Allow medical examinations, as recommended  Description: Interventions:  - Provide physical/neurological exams and/or referrals, per provider   Outcome: Progressing  Goal: Complete daily ADLs, including personal hygiene independently, as able  Description: Interventions:  - Observe, teach, and assist patient with ADLS  Outcome: Progressing     Problem: DISCHARGE PLANNING - CARE MANAGEMENT  Goal: Discharge to post-acute care or home with appropriate resources  Description: INTERVENTIONS:  - Conduct assessment to determine patient/family and health care team treatment goals, and need for post-acute services based on payer coverage, community resources, and patient preferences, and barriers to discharge  - Address psychosocial, clinical, and financial barriers to discharge as identified in assessment in conjunction with the patient/family and health care team  - Arrange appropriate level of post-acute services according to patient's   needs and preference and payer coverage in collaboration with the physician and health care team  - Communicate with and update the patient/family, physician, and health care team regarding progress on the discharge plan  - Arrange appropriate transportation to post-acute venues  Outcome: Progressing

## 2024-12-26 NOTE — ASSESSMENT & PLAN NOTE
Reviewed on 12/26/2024.  Status quo hearing same voices threatening to harm him and his family which has been going on for over 3 years and trying to learn to live with them  Continue medication as follows:  Clozapine 250 mg QHS for psychosis - increased 12/11/24  To consider further careful titration  CBC w/diff and CK every 2 weeks  CK was increased to 960 on 12/12 and oral hydration encouraged, CK improved to 905 on 12/13/24. Repeat on 12/17/24 decreased to 674 and on 12/20/24 decreased to 448  Labs  drawn on 12/24/2024, repeat  trending down  Continue Abilify 30 mg daily for psychosis as he is resistant to single antipsychotic  Continue Lexapro 20 mg daily for depression and anxiety  Continue Robinul 1 mg BID and 2 mg QHS for sialorrhea from Clozapine  Continue Atropine 1% solution  1 drop sublingually QHS for adverse effects of Clozapine  Continue Propanolol 10 mg BID for anxiety   Continue Melatonin 9 mg QHS for sleep  Continue Senna-Docusate sodium one 50 mg tablet QHS for constipation    Pt remains on dual antipsychotic Tx due to failure on monotherapy   .  S/p ECT treatments.  - ACT team referral was made for him living back with his aunt once MA funding comes through from the [facility]

## 2024-12-26 NOTE — PROGRESS NOTES
12/26/24 0866   Team Meeting   Meeting Type Daily Rounds   Team Members Present   Team Members Present Physician;Nurse;;Other (Discipline and Name)   Physician Team Member Holter & Thomas   Nursing Team Member Thaddeus   Social Work Team Member Jacob BROOKS   Other (Discipline and Name) ALMAS Wang & CHEL Mcdowell   Patient/Family Present   Patient Present No   Patient's Family Present No     Group Participation: 9/11  Visible in milieu. Reports symptoms of anxiety, depression and auditory hallucinations. Social with peers. No behavioral concerns.

## 2024-12-26 NOTE — ASSESSMENT & PLAN NOTE
Reviewed 12/26/24  Follows with medical team  Continue senna-docusate sodium dose per medical team.

## 2024-12-27 PROCEDURE — 99232 SBSQ HOSP IP/OBS MODERATE 35: CPT | Performed by: PSYCHIATRY & NEUROLOGY

## 2024-12-27 RX ADMIN — DOCUSATE SODIUM 100 MG: 100 CAPSULE, LIQUID FILLED ORAL at 17:22

## 2024-12-27 RX ADMIN — NICOTINE POLACRILEX 2 MG: 2 GUM, CHEWING ORAL at 13:05

## 2024-12-27 RX ADMIN — NICOTINE POLACRILEX 2 MG: 2 GUM, CHEWING ORAL at 08:37

## 2024-12-27 RX ADMIN — PROPRANOLOL HYDROCHLORIDE 10 MG: 10 TABLET ORAL at 21:11

## 2024-12-27 RX ADMIN — CLOZAPINE 250 MG: 25 TABLET ORAL at 21:10

## 2024-12-27 RX ADMIN — GLYCOPYRROLATE 2 MG: 1 TABLET ORAL at 21:11

## 2024-12-27 RX ADMIN — MULTIPLE VITAMINS W/ MINERALS TAB 1 TABLET: TAB ORAL at 08:39

## 2024-12-27 RX ADMIN — LORATADINE 10 MG: 10 TABLET ORAL at 08:38

## 2024-12-27 RX ADMIN — NICOTINE POLACRILEX 2 MG: 2 GUM, CHEWING ORAL at 17:22

## 2024-12-27 RX ADMIN — NICOTINE POLACRILEX 2 MG: 2 GUM, CHEWING ORAL at 21:10

## 2024-12-27 RX ADMIN — SENNOSIDES AND DOCUSATE SODIUM 2 TABLET: 50; 8.6 TABLET ORAL at 21:11

## 2024-12-27 RX ADMIN — GLYCOPYRROLATE 1 MG: 1 TABLET ORAL at 13:04

## 2024-12-27 RX ADMIN — PANTOPRAZOLE SODIUM 20 MG: 20 TABLET, DELAYED RELEASE ORAL at 08:36

## 2024-12-27 RX ADMIN — ARIPIPRAZOLE 30 MG: 15 TABLET ORAL at 08:38

## 2024-12-27 RX ADMIN — DOCUSATE SODIUM 100 MG: 100 CAPSULE, LIQUID FILLED ORAL at 08:38

## 2024-12-27 RX ADMIN — GLYCOPYRROLATE 1 MG: 1 TABLET ORAL at 08:38

## 2024-12-27 RX ADMIN — ESCITALOPRAM OXALATE 20 MG: 10 TABLET, FILM COATED ORAL at 08:37

## 2024-12-27 RX ADMIN — MELATONIN TAB 3 MG 9 MG: 3 TAB at 21:11

## 2024-12-27 RX ADMIN — ATROPINE SULFATE 1 DROP: 10 SOLUTION/ DROPS OPHTHALMIC at 21:11

## 2024-12-27 NOTE — NURSING NOTE
Patient has been visible on the unit all shift. Pleasant and cooperative. Social with select peers. Smiles on approach. Admits having auditory hallucinations telling him they will harm him and his family. Support offered. Medication compliant. Poor ADL's and unkept appearance. Attends some groups. Denies suicidal ideations.

## 2024-12-27 NOTE — PROGRESS NOTES
Progress Note - Behavioral Health   Name: Alberto Berumen 29 y.o. male I MRN: [id number]  Unit/Bed#: [facility] 112-02 I Date of Admission: 3/29/2024   Date of Service: 12/27/2024 I Hospital Day: 273    Assessment & Plan  Schizoaffective disorder, bipolar type (HCC)  Reviewed on 12/27/2024.  Status quo hearing same voices threatening to harm him and his family which has been going on for over 3 years and trying to learn to live with them  Continue medication as follows:  Clozapine 250 mg QHS for psychosis - increased 12/11/24  To consider further careful titration  CBC w/diff and CK every 2 weeks  CK was increased to 960 on 12/12 and oral hydration encouraged, CK improved to 905 on 12/13/24. Repeat on 12/17/24 decreased to 674 and on 12/20/24 decreased to 448  Labs  drawn on 12/24/2024, repeat  trending down  Continue Abilify 30 mg daily for psychosis as he is resistant to single antipsychotic  Continue Lexapro 20 mg daily for depression and anxiety  Continue Robinul 1 mg BID and 2 mg QHS for sialorrhea from Clozapine  Continue Atropine 1% solution  1 drop sublingually QHS for adverse effects of Clozapine  Continue Propanolol 10 mg BID for anxiety   Continue Melatonin 9 mg QHS for sleep  Continue Senna-Docusate sodium one 50 mg tablet QHS for constipation    Pt remains on dual antipsychotic Tx due to failure on monotherapy   .  S/p ECT treatments.  - ACT team referral was made for him living back with his aunt once MA funding comes through from the [facility]  Chronic idiopathic constipation  Reviewed 12/27/24  Follows with medical team  Continue senna-docusate sodium dose per medical team.   GERD (gastroesophageal reflux disease)  Reviewed 12/27/24  Follows with medical  Continue famotidine 20 mg tablet BID per medical team   Continue glycopyrrolate 1 mg tablet BID AM and afternoon per medical team  Continue glycopyrrolate 2 mg tablet before bed per medical team  Continue pantoprazole EC 20 mg tablet  every morning per medical team  This is a chronic condition, and is stable unless otherwise noted.  Tobacco abuse  Reviewed 12/27/24, on nicotine gum as as needed and encouraged smoking cessation  Continue to encourage cessation upon discharge    Elevated hemoglobin A1c  Reviewed 12/27/24  Follows with medical team    Class 2 obesity in adult  Reviewed 12/27/24  Follows with medical team and given dietary counseling and need for exercise    Primary hypertension  Reviewed 12/27/24  Follows with medical team on hydrochlorothiazide and Norvasc      Progress Toward Goals: Unchanged    Recommended Treatment: Continue with group therapy, milieu therapy and occupational therapy.      Risks, benefits and possible side effects of Medications:   Risks, benefits, and possible side effects of medications explained to patient and patient verbalizes understanding.      History of Present Illness   Behavior over the last 24 hours:  unchanged  Sleep: Adequate  Appetite: Ensure  Medication side effects: No  ROS: no complaints    Subjective:  Patient seen in room lying in bed.  He is overall calm and cooperative.  Reports depression and anxiety.  He has been reporting having auditory hallucinations of voices telling him they are going to kill him and his family.  Patient apparently checks on his family.  He does feel safe in the hospital however.  Patient's sleep is adequate.  He went to 7 out of 10 groups yesterday.  Medication compliant                Objective   Mental Status Evaluation:  Appearance:  casually dressed and older than stated age   Behavior:  Calm and cooperative   Speech:  normal pitch and normal volume   Mood:  anxious   Affect:  constricted   Thought Process:  goal directed   Thought Content:  Paranoia   Perceptual Disturbances: Auditory hallucinations   Risk Potential: Suicidal Ideations none  Homicidal Ideations none  Potential for Aggression No   Sensorium:  person, place, and time/date   Memory:  recent and  remote memory grossly intact   Consciousness:  alert and awake    Attention: attention span appeared shorter than expected for age   Insight:  Intact   Judgment: Intact   Gait/Station: Lying in bed   Motor Activity: no abnormal movements     Medications: all current active meds have been reviewed.      Lab Results: I have reviewed the following results:  Most Recent Labs:   Lab Results   Component Value Date    WBC 8.66 12/24/2024    RBC 5.23 12/24/2024    HGB 12.5 12/24/2024    HCT 41.1 12/24/2024     12/24/2024    RDW 14.3 12/24/2024    NEUTROABS 3.84 12/24/2024    SODIUM 140 10/16/2024    K 3.8 10/16/2024     10/16/2024    CO2 29 10/16/2024    BUN 9 10/16/2024    CREATININE 0.91 10/16/2024    GLUC 94 10/16/2024    GLUF 94 10/16/2024    CALCIUM 9.5 10/16/2024    AST 26 09/30/2024    ALT 42 09/30/2024    ALKPHOS 64 09/30/2024    TP 6.6 09/30/2024    ALB 3.8 09/30/2024    TBILI 0.47 09/30/2024    CHOLESTEROL 156 03/14/2024    HDL 44 03/14/2024    TRIG 133 03/14/2024    LDLCALC 85 03/14/2024    NONHDLC 112 03/14/2024    LITHIUM 0.61 01/09/2024    DDM4DUSTUNDT 1.062 11/15/2023    HGBA1C 5.0 04/01/2024    EAG 97 04/01/2024       Administrative Statements   Topics discussed with the patient / family include symptom assessment and management.

## 2024-12-27 NOTE — NURSING NOTE
Pt is calm, cooperative and visible on milieu. Pt reports AH, depression and anxiety; denies SI/HI/VH. Pt interacts appropriately with peers and reports sleeping well. Pt is able to make needs known and is medication compliant. Reports visit with family went well. Q 15 min checks maintained.

## 2024-12-27 NOTE — PLAN OF CARE
Problem: Alteration in Thoughts and Perception  Goal: Treatment Goal: Gain control of psychotic behaviors/thinking, reduce/eliminate presenting symptoms and demonstrate improved reality functioning upon discharge  Outcome: Progressing  Goal: Refrain from acting on delusional thinking/internal stimuli  Description: Interventions:  - Monitor patient closely, per order   - Utilize least restrictive measures   - Set reasonable limits, give positive feedback for acceptable   - Administer medications as ordered and monitor of potential side effects  Outcome: Progressing  Goal: Agree to be compliant with medication regime, as prescribed and report medication side effects  Description: Interventions:  - Offer appropriate PRN medication and supervise ingestion; conduct AIMS, as needed   Outcome: Progressing  Goal: Attend and participate in unit activities, including therapeutic, recreational, and educational groups  Description: Interventions:  -Encourage Visitation and family involvement in care  Outcome: Progressing  Goal: Complete daily ADLs, including personal hygiene independently, as able  Description: Interventions:  - Observe, teach, and assist patient with ADLS  - Monitor and promote a balance of rest/activity, with adequate nutrition and elimination   Outcome: Progressing     Problem: Ineffective Coping  Goal: Participates in unit activities  Description: Interventions:  - Provide therapeutic environment   - Provide required programming   - Redirect inappropriate behaviors   Outcome: Progressing  Goal: Patient/Family participate in treatment and DC plans  Description: Interventions:  - Provide therapeutic environment  Outcome: Progressing  Goal: Patient/Family verbalizes awareness of resources  Outcome: Progressing     Problem: Depression  Goal: Treatment Goal: Demonstrate behavioral control of depressive symptoms, verbalize feelings of improved mood/affect, and adopt new coping skills prior to  discharge  Outcome: Progressing  Goal: Verbalize thoughts and feelings  Description: Interventions:  - Assess and re-assess patient's level of risk   - Engage patient in 1:1 interactions, daily, for a minimum of 15 minutes   - Encourage patient to express feelings, fears, frustrations, hopes   Outcome: Progressing  Goal: Refrain from harming self  Description: Interventions:  - Monitor patient closely, per order   - Supervise medication ingestion, monitor effects and side effects   Outcome: Progressing  Goal: Refrain from isolation  Description: Interventions:  - Develop a trusting relationship   - Encourage socialization   Outcome: Progressing  Goal: Refrain from self-neglect  Outcome: Progressing     Problem: Anxiety  Goal: Anxiety is at manageable level  Description: Interventions:  - Assess and monitor patient's anxiety level.   - Monitor for signs and symptoms (heart palpitations, chest pain, shortness of breath, headaches, nausea, feeling jumpy, restlessness, irritable, apprehensive).   - Collaborate with interdisciplinary team and initiate plan and interventions as ordered.  - [facility] patient to unit/surroundings  - Explain treatment plan  - Encourage participation in care  - Encourage verbalization of concerns/fears  - Identify coping mechanisms  - Assist in developing anxiety-reducing skills  - Administer/offer alternative therapies  - Limit or eliminate stimulants  Outcome: Progressing     Problem: Alteration in Orientation  Goal: Treatment Goal: Demonstrate a reduction of confusion and improved orientation to person, place, time and/or situation upon discharge, according to optimum baseline/ability  Outcome: Progressing  Goal: Interact with staff daily  Description: Interventions:  - Assess and re-assess patient's level of orientation  - Engage patient in 1 on 1 interactions, daily, for a minimum of 15 minutes   - Establish rapport/trust with patient   Outcome: Progressing  Goal: Allow medical  examinations, as recommended  Description: Interventions:  - Provide physical/neurological exams and/or referrals, per provider   Outcome: Progressing  Goal: Cooperate with recommended testing/procedures  Description: Interventions:  - Determine need for ancillary testing  - Observe for mental status changes  - Implement falls/precaution protocol   Outcome: Progressing  Goal: Complete daily ADLs, including personal hygiene independently, as able  Description: Interventions:  - Observe, teach, and assist patient with ADLS  Outcome: Progressing     Problem: DISCHARGE PLANNING - CARE MANAGEMENT  Goal: Discharge to post-acute care or home with appropriate resources  Description: INTERVENTIONS:  - Conduct assessment to determine patient/family and health care team treatment goals, and need for post-acute services based on payer coverage, community resources, and patient preferences, and barriers to discharge  - Address psychosocial, clinical, and financial barriers to discharge as identified in assessment in conjunction with the patient/family and health care team  - Arrange appropriate level of post-acute services according to patient's   needs and preference and payer coverage in collaboration with the physician and health care team  - Communicate with and update the patient/family, physician, and health care team regarding progress on the discharge plan  - Arrange appropriate transportation to post-acute venues  Outcome: Progressing

## 2024-12-27 NOTE — PROGRESS NOTES
12/27/24 1039   Team Meeting   Meeting Type Daily Rounds   Team Members Present   Team Members Present Physician;Nurse;;Other (Discipline and Name)   Physician Team Member Holter [employer] Prayson   Nursing Team Member Mohsen   Social Work Team Member Jacob BROOKS   Other (Discipline and Name) ALMAS Wang & CHEL Mcdowell   Patient/Family Present   Patient Present No   Patient's Family Present No     Group Participation: 7/10  Auditory hallucinations, anxiety and depression reported. Sufficient appetite. Family visit from brother and sister-in-law today.

## 2024-12-27 NOTE — PLAN OF CARE
Problem: Ineffective Coping  Goal: Identifies ineffective coping skills  Outcome: Progressing  Goal: Identifies healthy coping skills  Outcome: Progressing  Goal: Demonstrates healthy coping skills  Outcome: Progressing  Goal: Participates in unit activities  Description: Interventions:  - Provide therapeutic environment   - Provide required programming   - Redirect inappropriate behaviors   Outcome: Progressing   He continues to attend and participate in the groups.

## 2024-12-28 PROCEDURE — 99232 SBSQ HOSP IP/OBS MODERATE 35: CPT | Performed by: PSYCHIATRY & NEUROLOGY

## 2024-12-28 RX ADMIN — CLOZAPINE 250 MG: 25 TABLET ORAL at 21:09

## 2024-12-28 RX ADMIN — ESCITALOPRAM OXALATE 20 MG: 10 TABLET, FILM COATED ORAL at 08:56

## 2024-12-28 RX ADMIN — ARIPIPRAZOLE 30 MG: 15 TABLET ORAL at 08:56

## 2024-12-28 RX ADMIN — NICOTINE POLACRILEX 2 MG: 2 GUM, CHEWING ORAL at 17:34

## 2024-12-28 RX ADMIN — MELATONIN TAB 3 MG 9 MG: 3 TAB at 21:10

## 2024-12-28 RX ADMIN — MULTIPLE VITAMINS W/ MINERALS TAB 1 TABLET: TAB ORAL at 08:56

## 2024-12-28 RX ADMIN — NICOTINE POLACRILEX 2 MG: 2 GUM, CHEWING ORAL at 21:09

## 2024-12-28 RX ADMIN — SENNOSIDES AND DOCUSATE SODIUM 2 TABLET: 50; 8.6 TABLET ORAL at 21:10

## 2024-12-28 RX ADMIN — GLYCOPYRROLATE 2 MG: 1 TABLET ORAL at 21:10

## 2024-12-28 RX ADMIN — LORATADINE 10 MG: 10 TABLET ORAL at 08:56

## 2024-12-28 RX ADMIN — PROPRANOLOL HYDROCHLORIDE 10 MG: 10 TABLET ORAL at 21:09

## 2024-12-28 RX ADMIN — HYDROCHLOROTHIAZIDE 12.5 MG: 12.5 TABLET ORAL at 08:56

## 2024-12-28 RX ADMIN — GLYCOPYRROLATE 1 MG: 1 TABLET ORAL at 14:24

## 2024-12-28 RX ADMIN — AMLODIPINE BESYLATE 5 MG: 5 TABLET ORAL at 08:56

## 2024-12-28 RX ADMIN — PROPRANOLOL HYDROCHLORIDE 10 MG: 10 TABLET ORAL at 08:56

## 2024-12-28 RX ADMIN — DOCUSATE SODIUM 100 MG: 100 CAPSULE, LIQUID FILLED ORAL at 08:56

## 2024-12-28 RX ADMIN — NICOTINE POLACRILEX 2 MG: 2 GUM, CHEWING ORAL at 12:45

## 2024-12-28 RX ADMIN — GLYCOPYRROLATE 1 MG: 1 TABLET ORAL at 08:56

## 2024-12-28 RX ADMIN — PANTOPRAZOLE SODIUM 20 MG: 20 TABLET, DELAYED RELEASE ORAL at 08:56

## 2024-12-28 RX ADMIN — DOCUSATE SODIUM 100 MG: 100 CAPSULE, LIQUID FILLED ORAL at 17:28

## 2024-12-28 RX ADMIN — ATROPINE SULFATE 1 DROP: 10 SOLUTION/ DROPS OPHTHALMIC at 21:33

## 2024-12-28 NOTE — NURSING NOTE
Alberto is visible on milieu. Social with peers. Compliant with meds and meals. Continues to report hearing voices,. Able to make needs known. Denies SI/HI. Q 15 min checks maintained.   Nicorette gum at [phone]

## 2024-12-28 NOTE — ASSESSMENT & PLAN NOTE
Follows with medical  Continue famotidine 20 mg tablet BID per medical team   Continue glycopyrrolate 1 mg tablet BID AM and afternoon per medical team  Continue glycopyrrolate 2 mg tablet before bed per medical team  Continue pantoprazole EC 20 mg tablet every morning per medical team  This is a chronic condition, and is stable unless otherwise noted.    No associated orders from this encounter found during lookback period of 72 hours.

## 2024-12-28 NOTE — ASSESSMENT & PLAN NOTE
Follows with medical team and given dietary counseling and need for exercise      No associated orders from this encounter found during lookback period of 72 hours.

## 2024-12-28 NOTE — NURSING NOTE
Pt slept all morning, came out for lunch then back in bed. Pt ate 50 % for lunch. Pt continues to report AH. Pt denies SI/HI/VH. Pt  is medication compliant, safety checks ongoing.

## 2024-12-28 NOTE — PROGRESS NOTES
Psychiatric Progress Note - Department of Behavioral Health   Alberto Berumen 29 y.o. male MRN: [id number]  Unit/Bed#: [facility] 112-02 Encounter: [id number]    ASSESSMENT & PLAN     Assessment & Plan  Schizoaffective disorder, bipolar type (HCC)    Status quo hearing same voices threatening to harm him and his family which has been going on for over 3 years and trying to learn to live with them  Continue medication as follows:  Clozapine 250 mg QHS for psychosis - increased 12/11/24  To consider further careful titration  CBC w/diff and CK every 2 weeks  CK was increased to 960 on 12/12 and oral hydration encouraged, CK improved to 905 on 12/13/24. Repeat on 12/17/24 decreased to 674 and on 12/20/24 decreased to 448  Labs  drawn on 12/24/2024, repeat  trending down  Continue Abilify 30 mg daily for psychosis as he is resistant to single antipsychotic  Continue Lexapro 20 mg daily for depression and anxiety  Continue Robinul 1 mg BID and 2 mg QHS for sialorrhea from Clozapine  Continue Atropine 1% solution  1 drop sublingually QHS for adverse effects of Clozapine  Continue Propanolol 10 mg BID for anxiety   Continue Melatonin 9 mg QHS for sleep  Continue Senna-Docusate sodium one 50 mg tablet QHS for constipation    Pt remains on dual antipsychotic Tx due to failure on monotherapy   .  S/p ECT treatments.  - ACT team referral was made for him living back with his aunt once MA funding comes through from the [facility]    No associated orders from this encounter found during lookback period of 72 hours.    Chronic idiopathic constipation    Follows with medical team  Continue senna-docusate sodium dose per medical team.     No associated orders from this encounter found during lookback period of 72 hours.  GERD (gastroesophageal reflux disease)    Follows with medical  Continue famotidine 20 mg tablet BID per medical team   Continue glycopyrrolate 1 mg tablet BID AM and afternoon per medical  team  Continue glycopyrrolate 2 mg tablet before bed per medical team  Continue pantoprazole EC 20 mg tablet every morning per medical team  This is a chronic condition, and is stable unless otherwise noted.    No associated orders from this encounter found during lookback period of 72 hours.  Tobacco abuse   on nicotine gum as as needed and encouraged smoking cessation  Continue to encourage cessation upon discharge      No associated orders from this encounter found during lookback period of 72 hours.  Elevated hemoglobin A1c    Follows with medical team      No associated orders from this encounter found during lookback period of 72 hours.  Class 2 obesity in adult    Follows with medical team and given dietary counseling and need for exercise      No associated orders from this encounter found during lookback period of 72 hours.    Primary hypertension    Follows with medical team on hydrochlorothiazide and Norvasc      No associated orders from this encounter found during lookback period of 72 hours.    Treatment Recommendations/Precautions:  Continue to promote patient participation in therapeutic milieu.  Continue medical management per medicine.  Continue previously prescribed psychotropic medication regimen; see below.  Continue behavioral health checks q.15 minutes.   Continue vitals per behavioral health unit protocol.  Discharge date per primary team    SUBJECTIVE     Patient evaluated this a.m. for continuity of care. Patient was discussed at length with nursing and treatment team. Per nursing, patient remains calm and cooperative, intermittently interactive in the milieu, adherent to his medications without any acute adverse effects. No acute distress is noted throughout evaluation. Alberto Berumen denies suicidal/homicidal ideation in addition to thoughts of self-injury, receptive to crisis planning provided by this writer, contacting for safety in the inpatient setting, admitting to an ability to  appropriately confide in staff including this writer.  Patient meets to dysphoric mood including depression and anxiety in addition to intermittent instances of auditory hallucinations that are negative in content, denying any additional psychiatric complaint/concerns    PSYCHIATRIC REVIEW OF SYSTEMS     Behavior over the last 24 hours: Unchanged  Sleep: Adequate  Appetite: Adequate  Medication side effects: None    REVIEW OF SYSTEMS   Review of systems: No complaints    OBJECTIVE     Vital Signs in Past 24 Hours:  Temp:  [97.5 °F (36.4 °C)-98 °F (36.7 °C)] 98 °F (36.7 °C)  HR:  [] 83  BP: (120-135)/(67-77) 120/67  Resp:  [18] 18  SpO2:  [98 %] 98 %  O2 Device: None (Room air)    Intake/Output in Past 24 hours:  No intake/output data recorded.  No intake/output data recorded.        Laboratory Results:  I have personally reviewed all pertinent laboratory/tests results.  Most Recent Labs:   Lab Results   Component Value Date    WBC 8.66 12/24/2024    RBC 5.23 12/24/2024    HGB 12.5 12/24/2024    HCT 41.1 12/24/2024     12/24/2024    RDW 14.3 12/24/2024    NEUTROABS 3.84 12/24/2024    SODIUM 140 10/16/2024    K 3.8 10/16/2024     10/16/2024    CO2 29 10/16/2024    BUN 9 10/16/2024    CREATININE 0.91 10/16/2024    GLUC 94 10/16/2024    GLUF 94 10/16/2024    CALCIUM 9.5 10/16/2024    AST 26 09/30/2024    ALT 42 09/30/2024    ALKPHOS 64 09/30/2024    TP 6.6 09/30/2024    ALB 3.8 09/30/2024    TBILI 0.47 09/30/2024    CHOLESTEROL 156 03/14/2024    HDL 44 03/14/2024    TRIG 133 03/14/2024    LDLCALC 85 03/14/2024    NONHDLC 112 03/14/2024    LITHIUM 0.61 01/09/2024    [id number] 1.062 11/15/2023    HGBA1C 5.0 04/01/2024    EAG 97 04/01/2024       Behavioral Health Medications: all current active meds have been reviewed and current meds:   Current Facility-Administered Medications:     acetaminophen (TYLENOL) tablet 650 mg, Q4H PRN    acetaminophen (TYLENOL) tablet 650 mg, Q6H PRN    aluminum-magnesium  hydroxide-simethicone (MAALOX) oral suspension 30 mL, Q4H PRN    amLODIPine (NORVASC) tablet 5 mg, Daily    ARIPiprazole (ABILIFY) tablet 30 mg, Daily    Artificial Tears ophthalmic solution 1 drop, Q3H PRN    atropine (ISOPTO ATROPINE) 1 % ophthalmic solution 1 drop, HS    haloperidol lactate (HALDOL) injection 2.5 mg, Q4H PRN Max 4/day **AND** LORazepam (ATIVAN) injection 1 mg, Q4H PRN Max 4/day **AND** benztropine (COGENTIN) injection 0.5 mg, Q4H PRN Max 4/day    benztropine (COGENTIN) injection 1 mg, Q4H PRN Max 6/day    haloperidol lactate (HALDOL) injection 5 mg, Q4H PRN Max 4/day **AND** LORazepam (ATIVAN) injection 2 mg, Q4H PRN Max 4/day **AND** benztropine (COGENTIN) injection 1 mg, Q4H PRN Max 4/day    benztropine (COGENTIN) tablet 1 mg, Q4H PRN Max 6/day    benztropine (COGENTIN) tablet 1 mg, Q4H PRN Max 6/day    bisacodyl (DULCOLAX) rectal suppository 10 mg, Daily PRN    calcium carbonate (TUMS) chewable tablet 500 mg, BID PRN    cloZAPine (CLOZARIL) tablet 250 mg, HS    hydrOXYzine HCL (ATARAX) tablet 50 mg, Q6H PRN Max 4/day **OR** diphenhydrAMINE (BENADRYL) injection 50 mg, Q6H PRN    hydrOXYzine HCL (ATARAX) tablet 50 mg, Q6H PRN Max 4/day **OR** diphenhydrAMINE (BENADRYL) injection 50 mg, Q6H PRN    diphenhydrAMINE-zinc acetate (BENADRYL) 2-0.1 % cream, BID PRN    docusate sodium (COLACE) capsule 100 mg, BID    escitalopram (LEXAPRO) tablet 20 mg, Daily    famotidine (PEPCID) tablet 20 mg, BID PRN    fluticasone (FLONASE) 50 mcg/act nasal spray 1 spray, Daily PRN    glycopyrrolate (ROBINUL) tablet 1 mg, BID (AM & Afternoon)    glycopyrrolate (ROBINUL) tablet 2 mg, HS    haloperidol (HALDOL) tablet 1 mg, Q6H PRN    haloperidol (HALDOL) tablet 2.5 mg, Q4H PRN Max 4/day    haloperidol (HALDOL) tablet 5 mg, Q4H PRN Max 4/day    hydroCHLOROthiazide tablet 12.5 mg, Daily    hydrocortisone 1 % ointment, 4x Daily PRN    hydrOXYzine HCL (ATARAX) tablet 100 mg, Q6H PRN Max 4/day **OR** LORazepam (ATIVAN)  injection 2 mg, Q6H PRN    hydrOXYzine HCL (ATARAX) tablet 100 mg, Q6H PRN Max 4/day **OR** LORazepam (ATIVAN) injection 2 mg, Q6H PRN    hydrOXYzine HCL (ATARAX) tablet 25 mg, Q6H PRN Max 4/day    ibuprofen (MOTRIN) tablet 600 mg, Q8H PRN    influenza vaccine (PF) (Fluarix) IM injection 0.5 mL, Once    loratadine (CLARITIN) tablet 10 mg, Daily    magnesium hydroxide (MILK OF MAGNESIA) oral suspension 30 mL, Once    melatonin tablet 3 mg, HS PRN    melatonin tablet 9 mg, HS    methocarbamol (ROBAXIN) tablet 500 mg, Q6H PRN    multivitamin-minerals (CENTRUM) tablet 1 tablet, Daily    nicotine polacrilex (NICORETTE) gum 2 mg, Q4H PRN    OLANZapine (ZyPREXA) tablet 5 mg, Q4H PRN Max 3/day **OR** OLANZapine (ZyPREXA) IM injection 2.5 mg, Q4H PRN Max 3/day    OLANZapine (ZyPREXA) tablet 5 mg, Q3H PRN Max 3/day **OR** OLANZapine (ZyPREXA) IM injection 5 mg, Q3H PRN Max 3/day    OLANZapine (ZyPREXA) tablet 2.5 mg, Q4H PRN Max 6/day    ondansetron (ZOFRAN-ODT) dispersible tablet 4 mg, Q6H PRN    pantoprazole (PROTONIX) EC tablet 20 mg, QAM    polyethylene glycol (MIRALAX) packet 17 g, Daily PRN    polyethylene glycol (MIRALAX) packet 17 g, Daily    propranolol (INDERAL) tablet 10 mg, Q12H KAYLIE    senna-docusate sodium (SENOKOT S) 8.6-50 mg per tablet 1 tablet, Daily PRN    senna-docusate sodium (SENOKOT S) 8.6-50 mg per tablet 2 tablet, HS    traZODone (DESYREL) tablet 50 mg, HS PRN    white petrolatum-mineral oil (EUCERIN,HYDROCERIN) cream, TID PRN.    Risks, benefits and possible side effects of Medications:   Risks, benefits, and possible side effects of medications explained to patient and patient verbalizes understanding.      Mental Status Evaluation:  Appearance:  age appropriate, bearded, casually dressed, and disheveled   Behavior:  psychomotor retardation   Speech:  normal pitch and normal volume   Mood:  anxious, depressed, and dysthymic   Affect:  blunted   Language naming objects and repeating phrases   Thought  Process:  goal directed   Thought Content:  Negative thinking   Perceptual Disturbances: Auditory hallucinations without commands   Risk Potential: Suicidal Ideations none, Homicidal Ideations none, and Potential for Aggression No   Sensorium:  person, place, and time/date   Cognition:  recent and remote memory grossly intact   Consciousness:  alert and awake    Attention: attention span appeared shorter than expected for age   Insight:  limited   Judgment: limited   Intellect Not assessed   Gait/Station: normal gait/station and normal balance   Motor Activity: no abnormal movements     Memory: Short and long term memory fair     Progress Toward Goals: Unchanged, as evidenced by their participation (or lack thereof) in individual, social and therapeutic milieu in addition to adherence to their medication regimen.    Recommended Treatment:   See above for assessment and plan.    Inpatient Psychiatric Certification: Based upon physical, mental and social evaluations, I certify that inpatient psychiatric services are medically necessary for this patient for a duration of greater than 2 midnights for the treatment of Schizoaffective disorder, bipolar type (HCC) including psychotropic medication management, participation in the therapeutic milieu and referrals as indicated. Available alternative community resources do not meet the patient's mental health care needs. I further attest that an established written individualized plan of care has been implemented and is outlined in the patient's medical records.    Counseling/Coordination of Care    I have expended greater than 15 minutes in which more than 50% of this time was expended in counseling/coordination of patient care relating to diagnostic results, prognosis, potential risks and benefits of management options, instructions for appropriate management, patient and/or collateral education, importance of adherence to management and/or risk factor reductions. Patient's  rights, confidentiality, exceptions to confidentiality, use of electronic medical record including appropriate staff access to medical record regarding behavioral health services and consent to treatment were reviewed.    Note Share:     This note was not shared with the patient due to reasonable likelihood of causing patient harm     This note has been constructed using a voice recognition system. There may be translation, syntax,  or grammatical errors. If you have any questions, please contact the dictating provider.    Jordan Christopher Holter, DO 12/28/24

## 2024-12-28 NOTE — PLAN OF CARE
Problem: Alteration in Thoughts and Perception  Goal: Treatment Goal: Gain control of psychotic behaviors/thinking, reduce/eliminate presenting symptoms and demonstrate improved reality functioning upon discharge  Outcome: Progressing  Goal: Refrain from acting on delusional thinking/internal stimuli  Description: Interventions:  - Monitor patient closely, per order   - Utilize least restrictive measures   - Set reasonable limits, give positive feedback for acceptable   - Administer medications as ordered and monitor of potential side effects  Outcome: Progressing  Goal: Agree to be compliant with medication regime, as prescribed and report medication side effects  Description: Interventions:  - Offer appropriate PRN medication and supervise ingestion; conduct AIMS, as needed   Outcome: Progressing  Goal: Attend and participate in unit activities, including therapeutic, recreational, and educational groups  Description: Interventions:  -Encourage Visitation and family involvement in care  Outcome: Progressing  Goal: Recognize dysfunctional thoughts, communicate reality-based thoughts at the time of discharge  Description: Interventions:  - Provide medication and psycho-education to assist patient in compliance and developing insight into his/her illness   Outcome: Progressing  Goal: Complete daily ADLs, including personal hygiene independently, as able  Description: Interventions:  - Observe, teach, and assist patient with ADLS  - Monitor and promote a balance of rest/activity, with adequate nutrition and elimination   Outcome: Progressing     Problem: Ineffective Coping  Goal: Participates in unit activities  Description: Interventions:  - Provide therapeutic environment   - Provide required programming   - Redirect inappropriate behaviors   Outcome: Progressing  Goal: Patient/Family participate in treatment and DC plans  Description: Interventions:  - Provide therapeutic environment  Outcome: Progressing  Goal:  Patient/Family verbalizes awareness of resources  Outcome: Progressing     Problem: Depression  Goal: Treatment Goal: Demonstrate behavioral control of depressive symptoms, verbalize feelings of improved mood/affect, and adopt new coping skills prior to discharge  Outcome: Progressing  Goal: Verbalize thoughts and feelings  Description: Interventions:  - Assess and re-assess patient's level of risk   - Engage patient in 1:1 interactions, daily, for a minimum of 15 minutes   - Encourage patient to express feelings, fears, frustrations, hopes   Outcome: Progressing  Goal: Refrain from harming self  Description: Interventions:  - Monitor patient closely, per order   - Supervise medication ingestion, monitor effects and side effects   Outcome: Progressing  Goal: Refrain from isolation  Description: Interventions:  - Develop a trusting relationship   - Encourage socialization   Outcome: Progressing  Goal: Refrain from self-neglect  Outcome: Progressing  Goal: Attend and participate in unit activities, including therapeutic, recreational, and educational groups  Description: Interventions:  - Provide therapeutic and educational activities daily, encourage attendance and participation, and document same in the medical record   Outcome: Progressing     Problem: Anxiety  Goal: Anxiety is at manageable level  Description: Interventions:  - Assess and monitor patient's anxiety level.   - Monitor for signs and symptoms (heart palpitations, chest pain, shortness of breath, headaches, nausea, feeling jumpy, restlessness, irritable, apprehensive).   - Collaborate with interdisciplinary team and initiate plan and interventions as ordered.  - [place] patient to unit/surroundings  - Explain treatment plan  - Encourage participation in care  - Encourage verbalization of concerns/fears  - Identify coping mechanisms  - Assist in developing anxiety-reducing skills  - Administer/offer alternative therapies  - Limit or eliminate  stimulants  Outcome: Progressing     Problem: Alteration in Orientation  Goal: Treatment Goal: Demonstrate a reduction of confusion and improved orientation to person, place, time and/or situation upon discharge, according to optimum baseline/ability  Outcome: Progressing  Goal: Interact with staff daily  Description: Interventions:  - Assess and re-assess patient's level of orientation  - Engage patient in 1 on 1 interactions, daily, for a minimum of 15 minutes   - Establish rapport/trust with patient   Outcome: Progressing  Goal: Allow medical examinations, as recommended  Description: Interventions:  - Provide physical/neurological exams and/or referrals, per provider   Outcome: Progressing  Goal: Complete daily ADLs, including personal hygiene independently, as able  Description: Interventions:  - Observe, teach, and assist patient with ADLS  Outcome: Progressing     Problem: DISCHARGE PLANNING - CARE MANAGEMENT  Goal: Discharge to post-acute care or home with appropriate resources  Description: INTERVENTIONS:  - Conduct assessment to determine patient/family and health care team treatment goals, and need for post-acute services based on payer coverage, community resources, and patient preferences, and barriers to discharge  - Address psychosocial, clinical, and financial barriers to discharge as identified in assessment in conjunction with the patient/family and health care team  - Arrange appropriate level of post-acute services according to patient's   needs and preference and payer coverage in collaboration with the physician and health care team  - Communicate with and update the patient/family, physician, and health care team regarding progress on the discharge plan  - Arrange appropriate transportation to post-acute venues  Outcome: Progressing

## 2024-12-28 NOTE — ASSESSMENT & PLAN NOTE
Follows with medical team      No associated orders from this encounter found during lookback period of 72 hours.   Addended by: FLOYD ALSTON on: 12/26/2018 06:50 AM     Modules accepted: Orders

## 2024-12-28 NOTE — ASSESSMENT & PLAN NOTE
on nicotine gum as as needed and encouraged smoking cessation  Continue to encourage cessation upon discharge      No associated orders from this encounter found during lookback period of 72 hours.

## 2024-12-28 NOTE — ASSESSMENT & PLAN NOTE
Follows with medical team  Continue senna-docusate sodium dose per medical team.     No associated orders from this encounter found during lookback period of 72 hours.

## 2024-12-28 NOTE — ASSESSMENT & PLAN NOTE
Status quo hearing same voices threatening to harm him and his family which has been going on for over 3 years and trying to learn to live with them  Continue medication as follows:  Clozapine 250 mg QHS for psychosis - increased 12/11/24  To consider further careful titration  CBC w/diff and CK every 2 weeks  CK was increased to 960 on 12/12 and oral hydration encouraged, CK improved to 905 on 12/13/24. Repeat on 12/17/24 decreased to 674 and on 12/20/24 decreased to 448  Labs  drawn on 12/24/2024, repeat  trending down  Continue Abilify 30 mg daily for psychosis as he is resistant to single antipsychotic  Continue Lexapro 20 mg daily for depression and anxiety  Continue Robinul 1 mg BID and 2 mg QHS for sialorrhea from Clozapine  Continue Atropine 1% solution  1 drop sublingually QHS for adverse effects of Clozapine  Continue Propanolol 10 mg BID for anxiety   Continue Melatonin 9 mg QHS for sleep  Continue Senna-Docusate sodium one 50 mg tablet QHS for constipation    Pt remains on dual antipsychotic Tx due to failure on monotherapy   .  S/p ECT treatments.  - ACT team referral was made for him living back with his aunt once MA funding comes through from the [facility]    No associated orders from this encounter found during lookback period of 72 hours.

## 2024-12-28 NOTE — ASSESSMENT & PLAN NOTE
Follows with medical team on hydrochlorothiazide and Norvasc      No associated orders from this encounter found during lookback period of 72 hours.

## 2024-12-28 NOTE — NURSING NOTE
Received pt in bed at change of shift with eyes closed; chest movement noted.  Continues the same thus this far as per q 15 min room checks.   Will continue to monitor behavior, sleeping pattern and any medical issues that may arise.    0550:  Sleeping 7+ hrs thus this far

## 2024-12-29 PROCEDURE — 99232 SBSQ HOSP IP/OBS MODERATE 35: CPT | Performed by: PSYCHIATRY & NEUROLOGY

## 2024-12-29 RX ADMIN — HYDROCHLOROTHIAZIDE 12.5 MG: 12.5 TABLET ORAL at 08:37

## 2024-12-29 RX ADMIN — MELATONIN TAB 3 MG 9 MG: 3 TAB at 21:15

## 2024-12-29 RX ADMIN — ATROPINE SULFATE 1 DROP: 10 SOLUTION/ DROPS OPHTHALMIC at 21:15

## 2024-12-29 RX ADMIN — GLYCOPYRROLATE 2 MG: 1 TABLET ORAL at 21:15

## 2024-12-29 RX ADMIN — SENNOSIDES AND DOCUSATE SODIUM 2 TABLET: 50; 8.6 TABLET ORAL at 21:15

## 2024-12-29 RX ADMIN — NICOTINE POLACRILEX 2 MG: 2 GUM, CHEWING ORAL at 21:15

## 2024-12-29 RX ADMIN — DOCUSATE SODIUM 100 MG: 100 CAPSULE, LIQUID FILLED ORAL at 17:11

## 2024-12-29 RX ADMIN — PROPRANOLOL HYDROCHLORIDE 10 MG: 10 TABLET ORAL at 21:16

## 2024-12-29 RX ADMIN — GLYCOPYRROLATE 1 MG: 1 TABLET ORAL at 08:37

## 2024-12-29 RX ADMIN — NICOTINE POLACRILEX 2 MG: 2 GUM, CHEWING ORAL at 17:11

## 2024-12-29 RX ADMIN — ESCITALOPRAM OXALATE 20 MG: 10 TABLET, FILM COATED ORAL at 08:37

## 2024-12-29 RX ADMIN — CLOZAPINE 250 MG: 25 TABLET ORAL at 21:15

## 2024-12-29 RX ADMIN — DOCUSATE SODIUM 100 MG: 100 CAPSULE, LIQUID FILLED ORAL at 08:37

## 2024-12-29 RX ADMIN — NICOTINE POLACRILEX 2 MG: 2 GUM, CHEWING ORAL at 12:48

## 2024-12-29 RX ADMIN — ARIPIPRAZOLE 30 MG: 15 TABLET ORAL at 08:36

## 2024-12-29 RX ADMIN — LORATADINE 10 MG: 10 TABLET ORAL at 08:36

## 2024-12-29 RX ADMIN — PANTOPRAZOLE SODIUM 20 MG: 20 TABLET, DELAYED RELEASE ORAL at 08:36

## 2024-12-29 RX ADMIN — GLYCOPYRROLATE 1 MG: 1 TABLET ORAL at 15:00

## 2024-12-29 RX ADMIN — MULTIPLE VITAMINS W/ MINERALS TAB 1 TABLET: TAB ORAL at 08:36

## 2024-12-29 RX ADMIN — PROPRANOLOL HYDROCHLORIDE 10 MG: 10 TABLET ORAL at 08:37

## 2024-12-29 RX ADMIN — AMLODIPINE BESYLATE 5 MG: 5 TABLET ORAL at 08:36

## 2024-12-29 NOTE — NURSING NOTE
Alberto maintained on ongoing SAFE precaution without incident on this shift. Awake, alert,pleasant and cooperative. Continues to be compliant with medication regimen. Auditory Hallucination  persistently the same, he is coping with the voice by writing music, talking with his peer, helping out the BHT with tasks and calling his family for reassurance that all is well with them.  Behavior control..

## 2024-12-29 NOTE — ASSESSMENT & PLAN NOTE
Status quo hearing same voices threatening to harm him and his family which has been going on for over 3 years and trying to learn to live with them  Continue medication as follows:  Clozapine 250 mg QHS for psychosis - increased 12/11/24  To consider further careful titration  CBC w/diff and CK every 2 weeks  CK was increased to 960 on 12/12 and oral hydration encouraged, CK improved to 905 on 12/13/24. Repeat on 12/17/24 decreased to 674 and on 12/20/24 decreased to 448  Labs  drawn on 12/24/2024, repeat  trending down  Continue Abilify 30 mg daily for psychosis as he is resistant to single antipsychotic  Continue Lexapro 20 mg daily for depression and anxiety  Continue Robinul 1 mg BID and 2 mg QHS for sialorrhea from Clozapine  Continue Atropine 1% solution  1 drop sublingually QHS for adverse effects of Clozapine  Continue Propanolol 10 mg BID for anxiety   Continue Melatonin 9 mg QHS for sleep  Continue Senna-Docusate sodium one 50 mg tablet QHS for constipation    Pt remains on dual antipsychotic Tx due to failure on monotherapy   .  S/p ECT treatments.  - ACT team referral was made for him living back with his aunt once MA funding comes through from the [facility]    No associated orders from this encounter found during lookback period of 72 hours.

## 2024-12-29 NOTE — NURSING NOTE
Pt is calm and cooperative, refused breakfast, ate 100% for lunch. Pt has been visible on the unit after lunch, social with peers and staff. Weekly wellness completed, encourage to apply lotion to feet/heels for dryness. Pt is medication compliant, safety checks ongoing.

## 2024-12-29 NOTE — ASSESSMENT & PLAN NOTE
Follows with medical team      No associated orders from this encounter found during lookback period of 72 hours.

## 2024-12-29 NOTE — PROGRESS NOTES
Psychiatric Progress Note - Department of Behavioral Health   Alberto Berumen 29 y.o. male MRN: [id number]  Unit/Bed#: [facility] 112-02 Encounter: [id number]    ASSESSMENT & PLAN     Assessment & Plan  Schizoaffective disorder, bipolar type (HCC)    Status quo hearing same voices threatening to harm him and his family which has been going on for over 3 years and trying to learn to live with them  Continue medication as follows:  Clozapine 250 mg QHS for psychosis - increased 12/11/24  To consider further careful titration  CBC w/diff and CK every 2 weeks  CK was increased to 960 on 12/12 and oral hydration encouraged, CK improved to 905 on 12/13/24. Repeat on 12/17/24 decreased to 674 and on 12/20/24 decreased to 448  Labs  drawn on 12/24/2024, repeat  trending down  Continue Abilify 30 mg daily for psychosis as he is resistant to single antipsychotic  Continue Lexapro 20 mg daily for depression and anxiety  Continue Robinul 1 mg BID and 2 mg QHS for sialorrhea from Clozapine  Continue Atropine 1% solution  1 drop sublingually QHS for adverse effects of Clozapine  Continue Propanolol 10 mg BID for anxiety   Continue Melatonin 9 mg QHS for sleep  Continue Senna-Docusate sodium one 50 mg tablet QHS for constipation    Pt remains on dual antipsychotic Tx due to failure on monotherapy   .  S/p ECT treatments.  - ACT team referral was made for him living back with his aunt once MA funding comes through from the [facility]    No associated orders from this encounter found during lookback period of 72 hours.    Chronic idiopathic constipation    Follows with medical team  Continue senna-docusate sodium dose per medical team.     No associated orders from this encounter found during lookback period of 72 hours.  GERD (gastroesophageal reflux disease)    Follows with medical  Continue famotidine 20 mg tablet BID per medical team   Continue glycopyrrolate 1 mg tablet BID AM and afternoon per medical  team  Continue glycopyrrolate 2 mg tablet before bed per medical team  Continue pantoprazole EC 20 mg tablet every morning per medical team  This is a chronic condition, and is stable unless otherwise noted.    No associated orders from this encounter found during lookback period of 72 hours.  Tobacco abuse   on nicotine gum as as needed and encouraged smoking cessation  Continue to encourage cessation upon discharge      No associated orders from this encounter found during lookback period of 72 hours.  Elevated hemoglobin A1c    Follows with medical team      No associated orders from this encounter found during lookback period of 72 hours.  Class 2 obesity in adult    Follows with medical team and given dietary counseling and need for exercise      No associated orders from this encounter found during lookback period of 72 hours.    Primary hypertension    Follows with medical team on hydrochlorothiazide and Norvasc      No associated orders from this encounter found during lookback period of 72 hours.    Treatment Recommendations/Precautions:  Continue to promote patient participation in therapeutic milieu.  Continue medical management per medicine.  Continue previously prescribed psychotropic medication regimen; see below.  Continue behavioral health checks q.15 minutes.   Continue vitals per behavioral health unit protocol.  Discharge date per primary team    SUBJECTIVE     Patient evaluated this a.m. for continuity of care. Patient was discussed at length with nursing and treatment team. Per nursing, patient remains calm, cooperative, intermittently interactive in the milieu, inherent to his medications less any acute adverse effects. No acute distress is noted throughout evaluation. Alberto Berumne denies suicidal/homicidal ideation in addition to thoughts of self-injury, receptive to crisis planning provided by this writer, contacting for safety in the inpatient setting, admitting to an ability to  appropriately confide in staff including this writer.  Patient remains superficial on approach, scant, sparse, denying any/all psychiatric complaints/concerns despite previous complaints pertaining to auditory hallucinations that are negative and derogatory in content    PSYCHIATRIC REVIEW OF SYSTEMS     Behavior over the last 24 hours:  unchanged  Sleep: adequate  Appetite: adequate  Medication side effects: No    REVIEW OF SYSTEMS   Review of systems: no complaints    OBJECTIVE     Vital Signs in Past 24 Hours:  Temp:  [97.5 °F (36.4 °C)-98.1 °F (36.7 °C)] 98.1 °F (36.7 °C)  HR:  [68-98] 81  BP: (121-123)/(58-64) 123/60  Resp:  [18] 18  SpO2:  [98 %-100 %] 98 %  O2 Device: None (Room air)    Intake/Output in Past 24 hours:  No intake/output data recorded.  No intake/output data recorded.        Laboratory Results:  I have personally reviewed all pertinent laboratory/tests results.  Most Recent Labs:   Lab Results   Component Value Date    WBC 8.66 12/24/2024    RBC 5.23 12/24/2024    HGB 12.5 12/24/2024    HCT 41.1 12/24/2024     12/24/2024    RDW 14.3 12/24/2024    NEUTROABS 3.84 12/24/2024    SODIUM 140 10/16/2024    K 3.8 10/16/2024     10/16/2024    CO2 29 10/16/2024    BUN 9 10/16/2024    CREATININE 0.91 10/16/2024    GLUC 94 10/16/2024    GLUF 94 10/16/2024    CALCIUM 9.5 10/16/2024    AST 26 09/30/2024    ALT 42 09/30/2024    ALKPHOS 64 09/30/2024    TP 6.6 09/30/2024    ALB 3.8 09/30/2024    TBILI 0.47 09/30/2024    CHOLESTEROL 156 03/14/2024    HDL 44 03/14/2024    TRIG 133 03/14/2024    LDLCALC 85 03/14/2024    NONHDLC 112 03/14/2024    LITHIUM 0.61 01/09/2024    [id number] 1.062 11/15/2023    HGBA1C 5.0 04/01/2024    EAG 97 04/01/2024       Behavioral Health Medications: all current active meds have been reviewed and current meds:   Current Facility-Administered Medications:     acetaminophen (TYLENOL) tablet 650 mg, Q4H PRN    acetaminophen (TYLENOL) tablet 650 mg, Q6H PRN     aluminum-magnesium hydroxide-simethicone (MAALOX) oral suspension 30 mL, Q4H PRN    amLODIPine (NORVASC) tablet 5 mg, Daily    ARIPiprazole (ABILIFY) tablet 30 mg, Daily    Artificial Tears ophthalmic solution 1 drop, Q3H PRN    atropine (ISOPTO ATROPINE) 1 % ophthalmic solution 1 drop, HS    haloperidol lactate (HALDOL) injection 2.5 mg, Q4H PRN Max 4/day **AND** LORazepam (ATIVAN) injection 1 mg, Q4H PRN Max 4/day **AND** benztropine (COGENTIN) injection 0.5 mg, Q4H PRN Max 4/day    benztropine (COGENTIN) injection 1 mg, Q4H PRN Max 6/day    haloperidol lactate (HALDOL) injection 5 mg, Q4H PRN Max 4/day **AND** LORazepam (ATIVAN) injection 2 mg, Q4H PRN Max 4/day **AND** benztropine (COGENTIN) injection 1 mg, Q4H PRN Max 4/day    benztropine (COGENTIN) tablet 1 mg, Q4H PRN Max 6/day    benztropine (COGENTIN) tablet 1 mg, Q4H PRN Max 6/day    bisacodyl (DULCOLAX) rectal suppository 10 mg, Daily PRN    calcium carbonate (TUMS) chewable tablet 500 mg, BID PRN    cloZAPine (CLOZARIL) tablet 250 mg, HS    hydrOXYzine HCL (ATARAX) tablet 50 mg, Q6H PRN Max 4/day **OR** diphenhydrAMINE (BENADRYL) injection 50 mg, Q6H PRN    hydrOXYzine HCL (ATARAX) tablet 50 mg, Q6H PRN Max 4/day **OR** diphenhydrAMINE (BENADRYL) injection 50 mg, Q6H PRN    diphenhydrAMINE-zinc acetate (BENADRYL) 2-0.1 % cream, BID PRN    docusate sodium (COLACE) capsule 100 mg, BID    escitalopram (LEXAPRO) tablet 20 mg, Daily    famotidine (PEPCID) tablet 20 mg, BID PRN    fluticasone (FLONASE) 50 mcg/act nasal spray 1 spray, Daily PRN    glycopyrrolate (ROBINUL) tablet 1 mg, BID (AM & Afternoon)    glycopyrrolate (ROBINUL) tablet 2 mg, HS    haloperidol (HALDOL) tablet 1 mg, Q6H PRN    haloperidol (HALDOL) tablet 2.5 mg, Q4H PRN Max 4/day    haloperidol (HALDOL) tablet 5 mg, Q4H PRN Max 4/day    hydroCHLOROthiazide tablet 12.5 mg, Daily    hydrocortisone 1 % ointment, 4x Daily PRN    hydrOXYzine HCL (ATARAX) tablet 100 mg, Q6H PRN Max 4/day **OR**  LORazepam (ATIVAN) injection 2 mg, Q6H PRN    hydrOXYzine HCL (ATARAX) tablet 100 mg, Q6H PRN Max 4/day **OR** LORazepam (ATIVAN) injection 2 mg, Q6H PRN    hydrOXYzine HCL (ATARAX) tablet 25 mg, Q6H PRN Max 4/day    ibuprofen (MOTRIN) tablet 600 mg, Q8H PRN    influenza vaccine (PF) (Fluarix) IM injection 0.5 mL, Once    loratadine (CLARITIN) tablet 10 mg, Daily    magnesium hydroxide (MILK OF MAGNESIA) oral suspension 30 mL, Once    melatonin tablet 3 mg, HS PRN    melatonin tablet 9 mg, HS    methocarbamol (ROBAXIN) tablet 500 mg, Q6H PRN    multivitamin-minerals (CENTRUM) tablet 1 tablet, Daily    nicotine polacrilex (NICORETTE) gum 2 mg, Q4H PRN    OLANZapine (ZyPREXA) tablet 5 mg, Q4H PRN Max 3/day **OR** OLANZapine (ZyPREXA) IM injection 2.5 mg, Q4H PRN Max 3/day    OLANZapine (ZyPREXA) tablet 5 mg, Q3H PRN Max 3/day **OR** OLANZapine (ZyPREXA) IM injection 5 mg, Q3H PRN Max 3/day    OLANZapine (ZyPREXA) tablet 2.5 mg, Q4H PRN Max 6/day    ondansetron (ZOFRAN-ODT) dispersible tablet 4 mg, Q6H PRN    pantoprazole (PROTONIX) EC tablet 20 mg, QAM    polyethylene glycol (MIRALAX) packet 17 g, Daily PRN    polyethylene glycol (MIRALAX) packet 17 g, Daily    propranolol (INDERAL) tablet 10 mg, Q12H KAYLIE    senna-docusate sodium (SENOKOT S) 8.6-50 mg per tablet 1 tablet, Daily PRN    senna-docusate sodium (SENOKOT S) 8.6-50 mg per tablet 2 tablet, HS    traZODone (DESYREL) tablet 50 mg, HS PRN    white petrolatum-mineral oil (EUCERIN,HYDROCERIN) cream, TID PRN.    Risks, benefits and possible side effects of Medications:   Risks, benefits, and possible side effects of medications explained to patient and patient verbalizes understanding.      Mental Status Evaluation:  Appearance:  age appropriate, bearded, casually dressed, and disheveled   Behavior:  psychomotor retardation   Speech:  normal pitch and normal volume   Mood:  euthymic   Affect:  blunted and mood-incongruent   Language sparse   Thought Process:  goal  directed   Thought Content:  Negative thinking   Perceptual Disturbances: None appearing internally preoccupied    Risk Potential: Suicidal Ideations none, Homicidal Ideations none, and Potential for Aggression No   Sensorium:  person, place, and time/date   Cognition:  recent and remote memory grossly intact   Consciousness:  alert and awake    Attention: attention span appeared shorter than expected for age   Insight:  limited   Judgment: limited   Intellect Not assessed   Gait/Station: normal gait/station and normal balance   Motor Activity: no abnormal movements     Memory: Short and long term memory  fair     Progress Toward Goals: unchanged, as evidenced by their participation (or lack thereof) in individual, social and therapeutic milieu in addition to adherence to their medication regimen.    Recommended Treatment:   See above for assessment and plan.    Inpatient Psychiatric Certification: Based upon physical, mental and social evaluations, I certify that inpatient psychiatric services are medically necessary for this patient for a duration of greater than 2 midnights for the treatment of Schizoaffective disorder, bipolar type (HCC) including psychotropic medication management, participation in the therapeutic milieu and referrals as indicated. Available alternative community resources do not meet the patient's mental health care needs. I further attest that an established written individualized plan of care has been implemented and is outlined in the patient's medical records.    Counseling/Coordination of Care    I have expended greater than 15 minutes in which more than 50% of this time was expended in counseling/coordination of patient care relating to diagnostic results, prognosis, potential risks and benefits of management options, instructions for appropriate management, patient and/or collateral education, importance of adherence to management and/or risk factor reductions. Patient's rights,  confidentiality, exceptions to confidentiality, use of electronic medical record including appropriate staff access to medical record regarding behavioral health services and consent to treatment were reviewed.    Note Share:     This note was not shared with the patient due to reasonable likelihood of causing patient harm     This note has been constructed using a voice recognition system. There may be translation, syntax,  or grammatical errors. If you have any questions, please contact the dictating provider.    Jordan Christopher Holter, DO 12/29/24

## 2024-12-29 NOTE — PLAN OF CARE
Problem: Alteration in Thoughts and Perception  Goal: Treatment Goal: Gain control of psychotic behaviors/thinking, reduce/eliminate presenting symptoms and demonstrate improved reality functioning upon discharge  Outcome: Progressing  Goal: Refrain from acting on delusional thinking/internal stimuli  Description: Interventions:  - Monitor patient closely, per order   - Utilize least restrictive measures   - Set reasonable limits, give positive feedback for acceptable   - Administer medications as ordered and monitor of potential side effects  Outcome: Progressing  Goal: Agree to be compliant with medication regime, as prescribed and report medication side effects  Description: Interventions:  - Offer appropriate PRN medication and supervise ingestion; conduct AIMS, as needed   Outcome: Progressing  Goal: Attend and participate in unit activities, including therapeutic, recreational, and educational groups  Description: Interventions:  -Encourage Visitation and family involvement in care  Outcome: Progressing  Goal: Recognize dysfunctional thoughts, communicate reality-based thoughts at the time of discharge  Description: Interventions:  - Provide medication and psycho-education to assist patient in compliance and developing insight into his/her illness   Outcome: Progressing  Goal: Complete daily ADLs, including personal hygiene independently, as able  Description: Interventions:  - Observe, teach, and assist patient with ADLS  - Monitor and promote a balance of rest/activity, with adequate nutrition and elimination   Outcome: Progressing     Problem: Ineffective Coping  Goal: Identifies ineffective coping skills  Outcome: Progressing  Goal: Identifies healthy coping skills  Outcome: Progressing  Goal: Demonstrates healthy coping skills  Outcome: Progressing  Goal: Participates in unit activities  Description: Interventions:  - Provide therapeutic environment   - Provide required programming   - Redirect  inappropriate behaviors   Outcome: Progressing  Goal: Patient/Family participate in treatment and DC plans  Description: Interventions:  - Provide therapeutic environment  Outcome: Progressing  Goal: Patient/Family verbalizes awareness of resources  Outcome: Progressing     Problem: Depression  Goal: Treatment Goal: Demonstrate behavioral control of depressive symptoms, verbalize feelings of improved mood/affect, and adopt new coping skills prior to discharge  Outcome: Progressing  Goal: Verbalize thoughts and feelings  Description: Interventions:  - Assess and re-assess patient's level of risk   - Engage patient in 1:1 interactions, daily, for a minimum of 15 minutes   - Encourage patient to express feelings, fears, frustrations, hopes   Outcome: Progressing  Goal: Refrain from harming self  Description: Interventions:  - Monitor patient closely, per order   - Supervise medication ingestion, monitor effects and side effects   Outcome: Progressing  Goal: Refrain from isolation  Description: Interventions:  - Develop a trusting relationship   - Encourage socialization   Outcome: Progressing  Goal: Refrain from self-neglect  Outcome: Progressing  Goal: Attend and participate in unit activities, including therapeutic, recreational, and educational groups  Description: Interventions:  - Provide therapeutic and educational activities daily, encourage attendance and participation, and document same in the medical record   Outcome: Progressing     Problem: Anxiety  Goal: Anxiety is at manageable level  Description: Interventions:  - Assess and monitor patient's anxiety level.   - Monitor for signs and symptoms (heart palpitations, chest pain, shortness of breath, headaches, nausea, feeling jumpy, restlessness, irritable, apprehensive).   - Collaborate with interdisciplinary team and initiate plan and interventions as ordered.  - [place] patient to unit/surroundings  - Explain treatment plan  - Encourage participation in  care  - Encourage verbalization of concerns/fears  - Identify coping mechanisms  - Assist in developing anxiety-reducing skills  - Administer/offer alternative therapies  - Limit or eliminate stimulants  Outcome: Progressing     Problem: Alteration in Orientation  Goal: Treatment Goal: Demonstrate a reduction of confusion and improved orientation to person, place, time and/or situation upon discharge, according to optimum baseline/ability  Outcome: Progressing  Goal: Interact with staff daily  Description: Interventions:  - Assess and re-assess patient's level of orientation  - Engage patient in 1 on 1 interactions, daily, for a minimum of 15 minutes   - Establish rapport/trust with patient   Outcome: Progressing  Goal: Express concerns related to confused thinking related to:  Description: Interventions:  - Encourage patient to express feelings, fears, frustrations, hopes  - Assign consistent caregivers   - [place]/re-orient patient as needed  - Allow comfort items, as appropriate  - Provide visual cues, signs, etc.   Outcome: Progressing  Goal: Allow medical examinations, as recommended  Description: Interventions:  - Provide physical/neurological exams and/or referrals, per provider   Outcome: Progressing  Goal: Cooperate with recommended testing/procedures  Description: Interventions:  - Determine need for ancillary testing  - Observe for mental status changes  - Implement falls/precaution protocol   Outcome: Progressing  Goal: Complete daily ADLs, including personal hygiene independently, as able  Description: Interventions:  - Observe, teach, and assist patient with ADLS  Outcome: Progressing     Problem: DISCHARGE PLANNING - CARE MANAGEMENT  Goal: Discharge to post-acute care or home with appropriate resources  Description: INTERVENTIONS:  - Conduct assessment to determine patient/family and health care team treatment goals, and need for post-acute services based on payer coverage, community resources, and  patient preferences, and barriers to discharge  - Address psychosocial, clinical, and financial barriers to discharge as identified in assessment in conjunction with the patient/family and health care team  - Arrange appropriate level of post-acute services according to patient's   needs and preference and payer coverage in collaboration with the physician and health care team  - Communicate with and update the patient/family, physician, and health care team regarding progress on the discharge plan  - Arrange appropriate transportation to post-acute venues  Outcome: Progressing

## 2024-12-30 PROCEDURE — 99232 SBSQ HOSP IP/OBS MODERATE 35: CPT | Performed by: PSYCHIATRY & NEUROLOGY

## 2024-12-30 RX ADMIN — NICOTINE POLACRILEX 2 MG: 2 GUM, CHEWING ORAL at 17:06

## 2024-12-30 RX ADMIN — DOCUSATE SODIUM 100 MG: 100 CAPSULE, LIQUID FILLED ORAL at 17:06

## 2024-12-30 RX ADMIN — ATROPINE SULFATE 1 DROP: 10 SOLUTION/ DROPS OPHTHALMIC at 21:13

## 2024-12-30 RX ADMIN — NICOTINE POLACRILEX 2 MG: 2 GUM, CHEWING ORAL at 13:11

## 2024-12-30 RX ADMIN — GLYCOPYRROLATE 2 MG: 1 TABLET ORAL at 21:14

## 2024-12-30 RX ADMIN — SENNOSIDES AND DOCUSATE SODIUM 2 TABLET: 50; 8.6 TABLET ORAL at 21:14

## 2024-12-30 RX ADMIN — PANTOPRAZOLE SODIUM 20 MG: 20 TABLET, DELAYED RELEASE ORAL at 08:43

## 2024-12-30 RX ADMIN — MELATONIN TAB 3 MG 9 MG: 3 TAB at 21:14

## 2024-12-30 RX ADMIN — ESCITALOPRAM OXALATE 20 MG: 10 TABLET, FILM COATED ORAL at 08:43

## 2024-12-30 RX ADMIN — HYDROCHLOROTHIAZIDE 12.5 MG: 12.5 TABLET ORAL at 08:43

## 2024-12-30 RX ADMIN — MULTIPLE VITAMINS W/ MINERALS TAB 1 TABLET: TAB ORAL at 08:43

## 2024-12-30 RX ADMIN — PROPRANOLOL HYDROCHLORIDE 10 MG: 10 TABLET ORAL at 08:43

## 2024-12-30 RX ADMIN — GLYCOPYRROLATE 1 MG: 1 TABLET ORAL at 13:11

## 2024-12-30 RX ADMIN — NICOTINE POLACRILEX 2 MG: 2 GUM, CHEWING ORAL at 21:14

## 2024-12-30 RX ADMIN — PROPRANOLOL HYDROCHLORIDE 10 MG: 10 TABLET ORAL at 21:14

## 2024-12-30 RX ADMIN — NICOTINE POLACRILEX 2 MG: 2 GUM, CHEWING ORAL at 08:44

## 2024-12-30 RX ADMIN — CLOZAPINE 250 MG: 25 TABLET ORAL at 21:13

## 2024-12-30 RX ADMIN — DOCUSATE SODIUM 100 MG: 100 CAPSULE, LIQUID FILLED ORAL at 08:42

## 2024-12-30 RX ADMIN — AMLODIPINE BESYLATE 5 MG: 5 TABLET ORAL at 08:42

## 2024-12-30 RX ADMIN — LORATADINE 10 MG: 10 TABLET ORAL at 08:42

## 2024-12-30 RX ADMIN — ARIPIPRAZOLE 30 MG: 15 TABLET ORAL at 08:42

## 2024-12-30 RX ADMIN — GLYCOPYRROLATE 1 MG: 1 TABLET ORAL at 08:44

## 2024-12-30 NOTE — PROGRESS NOTES
Progress Note - Behavioral Health   Name: Alberto Berumen 29 y.o. male I MRN: [id number]  Unit/Bed#: [facility] 112-02 I Date of Admission: 3/29/2024   Date of Service: 12/30/2024 I Hospital Day: 276     Assessment & Plan  Schizoaffective disorder, bipolar type (HCC)  Reviewed on 12/30/24  Status quo with same threatening voives, not commanding   Continue medication as follows:  Clozapine 250 mg QHS for psychosis - increased 12/11/24  To consider further careful titration  CBC w/diff and CK every 2 weeks  CK was increased to 960 on 12/12 and oral hydration encouraged, CK improved to 905 on 12/13/24. Repeat on 12/17/24 decreased to 674 and on 12/20/24 decreased to 448  Labs  drawn on 12/24/2024, repeat  trending down  Continue Abilify 30 mg daily for psychosis as he is resistant to single antipsychotic  Continue Lexapro 20 mg daily for depression and anxiety  Continue Robinul 1 mg BID and 2 mg QHS for sialorrhea from Clozapine  Continue Atropine 1% solution  1 drop sublingually QHS for adverse effects of Clozapine  Continue Propanolol 10 mg BID for anxiety   Continue Melatonin 9 mg QHS for sleep  Continue Senna-Docusate sodium one 50 mg tablet QHS for constipation    Pt remains on dual antipsychotic Tx due to failure on monotherapy   .  S/p ECT treatments.  - ACT team referral was made for him living back with his aunt once MA funding comes through from the [facility]    No associated orders from this encounter found during lookback period of 72 hours.    Chronic idiopathic constipation  Reviewed on 12/30/24  Follows with medical team  Continue senna-docusate sodium dose per medical team.     No associated orders from this encounter found during lookback period of 72 hours.  GERD (gastroesophageal reflux disease)  Reviewed on 12/30/24  Follows with medical  Continue famotidine 20 mg tablet BID per medical team   Continue glycopyrrolate 1 mg tablet BID AM and afternoon per medical team  Continue  glycopyrrolate 2 mg tablet before bed per medical team  Continue pantoprazole EC 20 mg tablet every morning per medical team  This is a chronic condition, and is stable unless otherwise noted.    No associated orders from this encounter found during lookback period of 72 hours.  Tobacco abuse   Reviewed on 12/30/24  on nicotine gum as as needed and encouraged smoking cessation  Continue to encourage cessation upon discharge      No associated orders from this encounter found during lookback period of 72 hours.  Elevated hemoglobin A1c  Reviewed on 12/30/24  Follows with medical team      No associated orders from this encounter found during lookback period of 72 hours.  Class 2 obesity in adult  Reviewed on 112/30/24  Follows with medical team and given dietary counseling and need for exercise      No associated orders from this encounter found during lookback period of 72 hours.    Primary hypertension  Reviewed on 12/30/24  Follows with medical team on hydrochlorothiazide and Norvasc      No associated orders from this encounter found during lookback period of 72 hours.    Patient Active Problem List   Diagnosis    GERD (gastroesophageal reflux disease)    Schizoaffective disorder, bipolar type (HCC)    Tobacco abuse    T wave inversion in EKG    Syringoma    Chronic idiopathic constipation    Vitamin B 12 deficiency    Vitamin D deficiency    Confluent and reticulate papillomatosis    Class 2 obesity in adult    Primary hypertension    Elevated hemoglobin A1c    Bilateral lower extremity edema     Review of systems: unremarkable, reprts drinking fluids to bring down the CK   assessment  Overall Status: status quo  certification Statement: The patient will continue to require additional inpatient hospital stay due to ongoing voices that are threatening to mixing lack of response to medications and ECT until eligible for an ACT team to live with his aunt.     Medications:  propranolol 10 mg every 12 hours  for  anxiety, Abilify 30 mg mg at bedtime Lexapro 20 mg once a day,  and senna once a day day for constipation clozapine 250 mg at bedtime, Robinul 1 mg twice a day and 2 mg at bedtime for drooling from Clozapine, Colace 100 mg twice a day with MiraLAX daily for constipation from Clozapine    side effects from treatment: None reported  Medication changes   Clozapine increased to 250 mg on 12/11/2024 from 225 mg and none today   medication education  Risks side effects benefits and precautions of medications discussed with patient and he did verbalize an understanding about risks for metabolic syndrome from being on neuroleptics and risk for tardive dyskinesia etc.   All meds reviewed  Understanding of medications: Has some understanding  Justification for dual anti-psychotics: due to not responding to single antipsychotics    Non-pharmacological treatments  Continue with individual, group, milieu and occupational therapy using recovery principles and psycho-education about accepting illness and the need for treatment.  Waiting for [facility] to fund an ACT team as he lost his MA benefits and to live with aunt in the [facility]  Maintenance ECTs  completed several months ago  Refuses to see a dietician and agrees to do more exercises as he is about 100 lbs overweight   Prolactin level high so Risperdal replaced with Abilify which he has tolerated well so far with prolactin level back to normal  Counseled to take the stool softeners and laxatives regularly  Dietary counseling given to watch diet and to exercise    Safety  Safety and communication plan established to target dynamic risk factors discussed above.    Discharge Plan back to his aunt in the [facility] once his MA benefits are reinstated with an ACT team    Interval Progress   No significant changes, still hears same threatening voices as usual, reports he istryingto learn to live with them as they have not gone away for over 3 years now by hitting the punching bag and  distracting self  an dis usually polite, friendly, cooperative, in good spirits when approached, still with some dysphoria over the chronic  voices but not suicidal at all, Meets with talking to sisters.       acceptance by patient: accepting  Hopefulness in recovery: living with aunt  Involved in reintegration process: talking and meeting with sisters and aunt   trusting in relationship with psychiatrist:trusting  Sleep: good  Appetite: good  Compliance with Medications: good  Group attendance: 5/10 last Friday  Significant events and progress towards goals: Status quo    Mental Status Exam  Appearance: age appropriate, improved grooming, looks older than stated age, overweight, with hair in dreadlocks casually dressed with a clean shaven face, fairly groomed with good eye contact found pacing the hallway with good eye contact well-groomed, found laying on bed under the covers today when approached but easily aroused  behavior: Polite friendly better groomed with good eye contact  speech: normal rate, normal volume, normal pitch  Mood: dysphoric, anxious, continues to report feeling very good because of the voices that are never going away  affect: constricted, inappropriate, mood-congruent, with no good mood reactivity .    thought Process: organized, logical, coherent, goal directed, linear, decreased rate of thoughts, slowing of thoughts, negative thinking, impaired abstract reasoning, concrete  Thought Content: paranoid ideation, some paranoia, grandiose ideas, intrusive thoughts, preoccupied, chronic, continues to report paranoia about people threatening to kill him and his family because of the voices.  He believes ECT has helped so that he is not much in his head.  No other delusions elicited.  No current suicidal or homicidal thoughts and no plans verbalized but today reports having passive death wishes because of voices with no plans and able to CFS and it has not changed yet  .  No phobias obsessions  compulsions or distorted body perceptions reported.   Perceptual Disturbances: Same voices threatening to kill him and his family never commanding   Hx Risk Factors: chronic psychiatric problems, chronic anxiety symptoms, chronic psychotic symptoms,    Sensorium: Oriented x 3 spheres and situation  Attention and concentration span: Intact  Cognition: recent and remote memory grossly intact  Consciousness: alert and awake  Intellect: appears to be of average intelligence  Insight: intact  Judgement: intact  Motor Activity: no abnormal movements     Vitals  Temp:  [97.5 °F (36.4 °C)-98 °F (36.7 °C)] 97.5 °F (36.4 °C)  HR:  [78-98] 78  Resp:  [17-18] 17  BP: (111-125)/(60-69) 125/64  SpO2:  [98 %-100 %] 98 %  No intake or output data in the 24 hours ending 12/30/24 0852          Lab Results:  today   Current Facility-Administered Medications   Medication Dose Route Frequency Provider Last Rate    acetaminophen  650 mg Oral Q4H PRN Jordan C Holter, DO      acetaminophen  650 mg Oral Q6H PRN HOLLI Lion      aluminum-magnesium hydroxide-simethicone  30 mL Oral Q4H PRN Jordan C Holter, DO      amLODIPine  5 mg Oral Daily HOLLI Lion      ARIPiprazole  30 mg Oral Daily Bora Rosario MD      Artificial Tears  1 drop Both Eyes Q3H PRN Jordan C Holter,       atropine  1 drop Sublingual  Harjeet Torres, DO      haloperidol lactate  2.5 mg Intramuscular Q4H PRN Max 4/day HOLLI Lion      And    LORazepam  1 mg Intramuscular Q4H PRN Max 4/day HOLLI Lion      And    benztropine  0.5 mg Intramuscular Q4H PRN Max 4/day HOLLI Lion      benztropine  1 mg Intramuscular Q4H PRN Max 6/day Jordan C Holter, DO      haloperidol lactate  5 mg Intramuscular Q4H PRN Max 4/day HOLLI Lion      And    LORazepam  2 mg Intramuscular Q4H PRN Max 4/day HOLLI Lion      And    benztropine  1 mg Intramuscular Q4H PRN Max 4/day HOLLI Lion      benztropine  1 mg Oral Q4H PRN Max  6/day HOLLI Lion      benztropine  1 mg Oral Q4H PRN Max 6/day Ion C Holter, DO      bisacodyl  10 mg Rectal Daily PRN HOLLI Lion      calcium carbonate  500 mg Oral BID PRN HOLLI Monge      cloZAPine  250 mg Oral HS Bora Rosario MD      hydrOXYzine HCL  50 mg Oral Q6H PRN Max 4/day Ion C Holter, DO      Or    diphenhydrAMINE  50 mg Intramuscular Q6H PRN Ion  Holter, DO      hydrOXYzine HCL  50 mg Oral Q6H PRN Max 4/day HOLLI Lion      Or    diphenhydrAMINE  50 mg Intramuscular Q6H PRN HOLLI Lion      diphenhydrAMINE-zinc acetate   Topical BID PRN HOLLI Lion      docusate sodium  100 mg Oral BID Jourdan Dickens,       escitalopram  20 mg Oral Daily HOLLI Lion      famotidine  20 mg Oral BID PRN HOLLI Monge      fluticasone  1 spray Each Nare Daily PRN HOLLI Monge      glycopyrrolate  1 mg Oral BID (AM & Afternoon) Bora Rosario MD      glycopyrrolate  2 mg Oral HS Bora Rosario MD      haloperidol  1 mg Oral Q6H PRN HOLLI Lion      haloperidol  2.5 mg Oral Q4H PRN Max 4/day HOLLI Lion      haloperidol  5 mg Oral Q4H PRN Max 4/day HOLLI Lion      hydroCHLOROthiazide  12.5 mg Oral Daily Pia Mantilla, HOLLI      hydrocortisone   Topical 4x Daily PRN HOLLI Lion      hydrOXYzine HCL  100 mg Oral Q6H PRN Max 4/day Ion FISCHER Holter, DO      Or    LORazepam  2 mg Intramuscular Q6H PRN Ion FISCHER Holter, DO      hydrOXYzine HCL  100 mg Oral Q6H PRN Max 4/day HOLLI Lion      Or    LORazepam  2 mg Intramuscular Q6H PRN HOLLI Lion      hydrOXYzine HCL  25 mg Oral Q6H PRN Max 4/day Ion C Holter, DO      ibuprofen  600 mg Oral Q8H PRN HOLLI Lion      influenza vaccine  0.5 mL Intramuscular Once Bora Rosario MD      loratadine  10 mg Oral Daily Brina Guillen MD      magnesium hydroxide  30 mL Oral Once Jourdan Dickens DO      melatonin  3 mg Oral HS PRN  Bora Rosario MD      melatonin  9 mg Oral HS Bora Rosario MD      methocarbamol  500 mg Oral Q6H PRN HOLLI Lion      multivitamin-minerals  1 tablet Oral Daily Eileen GonzalezHOLLI evans      nicotine polacrilex  2 mg Oral Q4H PRN Bora Rosario MD      OLANZapine  5 mg Oral Q4H PRN Max 3/day [facility] Holter, DO      Or    OLANZapine  2.5 mg Intramuscular Q4H PRN Max 3/day [facility] Holter, DO      OLANZapine  5 mg Oral Q3H PRN Max 3/day [facility] Holter, DO      Or    OLANZapine  5 mg Intramuscular Q3H PRN Max 3/day [facility] Holter, DO      OLANZapine  2.5 mg Oral Q4H PRN Max 6/day [facility] Holter, DO      ondansetron  4 mg Oral Q6H PRN HOLLI Lion      pantoprazole  20 mg Oral QAM [facility] Holter, DO      polyethylene glycol  17 g Oral Daily PRN HOLLI Ghotra      polyethylene glycol  17 g Oral Daily Jourdan Dickens, DO      propranolol  10 mg Oral Q12H KAYLIE HOLLI Lion      senna-docusate sodium  1 tablet Oral Daily PRN [facility] Holter, DO      senna-docusate sodium  2 tablet Oral HS Jourdan Dickens,       traZODone  50 mg Oral HS PRN HOLLI Lion      white petrolatum-mineral oil   Topical TID PRN HOLLI Lion         Counseling / Coordination of Care: Total floor / unit time spent today 15 minutes. Greater than 50% of total time was spent with the patient and / or family counseling and / or somewhat receptive to supportive listening and teaching positive coping skills to deal with symptom mangement.     Patient's Rights, confidentiality and exceptions to confidentiality, use of automated medical record, Behavioral Health Services staff access to medical record, and consent to treatment reviewed.    This note has been dictated and hence there may be problems with punctuation, spelling and formatting and if anyone has any concerns please address them to Dr. Rosario   This note is not shared with patient due to potential for making patient's condition worse by knowing  the content of the note.  Ms. Reese occasionally medications occasionally but that she is not happy

## 2024-12-30 NOTE — ASSESSMENT & PLAN NOTE
Reviewed on 12/30/24  on nicotine gum as as needed and encouraged smoking cessation  Continue to encourage cessation upon discharge      No associated orders from this encounter found during lookback period of 72 hours.

## 2024-12-30 NOTE — NURSING NOTE
"Patient visible in the milieu. Observed sitting at a table writing bible verses. Reports some depression, anxiety, and auditory hallucinations \"that I am going to die\". Reassurance provided.     Medication compliant. Attending groups.    Staff availability reinforced.   "

## 2024-12-30 NOTE — ASSESSMENT & PLAN NOTE
Reviewed on 12/30/24  Follows with medical  Continue famotidine 20 mg tablet BID per medical team   Continue glycopyrrolate 1 mg tablet BID AM and afternoon per medical team  Continue glycopyrrolate 2 mg tablet before bed per medical team  Continue pantoprazole EC 20 mg tablet every morning per medical team  This is a chronic condition, and is stable unless otherwise noted.    No associated orders from this encounter found during lookback period of 72 hours.

## 2024-12-30 NOTE — ASSESSMENT & PLAN NOTE
Reviewed on 12/30/24  Follows with medical team  Continue senna-docusate sodium dose per medical team.     No associated orders from this encounter found during lookback period of 72 hours.

## 2024-12-30 NOTE — PROGRESS NOTES
12/30/24 0814   Team Meeting   Meeting Type Daily Rounds   Team Members Present   Team Members Present Physician;Nurse;;Other (Discipline and Name)   Physician Team Member Thomas, Holter   Nursing Team Member Claudia   Social Work Team Member Jose J ORTEGA   Patient/Family Present   Patient Present No   Patient's Family Present No     Groups Participation  5/10.   Patient's compliant with medications. He's engaged in his treatment. Waiting for [facility] funding for [facility] services to discharge home.

## 2024-12-30 NOTE — ASSESSMENT & PLAN NOTE
Reviewed on 12/30/24  Follows with medical team      No associated orders from this encounter found during lookback period of 72 hours.

## 2024-12-30 NOTE — NURSING NOTE
Pt is calm, cooperative and visible on milieu. Pt reports AH, depression and anxiety; denies SI/HI/VH. Compliant with medications and meals. Social with staff and peers. Able to make needs known. Q 15 min checks maintained.

## 2024-12-30 NOTE — ASSESSMENT & PLAN NOTE
Reviewed on 12/30/24  Follows with medical team on hydrochlorothiazide and Norvasc      No associated orders from this encounter found during lookback period of 72 hours.

## 2024-12-30 NOTE — ASSESSMENT & PLAN NOTE
Reviewed on 12/30/24  Status quo with same threatening voives, not commanding   Continue medication as follows:  Clozapine 250 mg QHS for psychosis - increased 12/11/24  To consider further careful titration  CBC w/diff and CK every 2 weeks  CK was increased to 960 on 12/12 and oral hydration encouraged, CK improved to 905 on 12/13/24. Repeat on 12/17/24 decreased to 674 and on 12/20/24 decreased to 448  Labs  drawn on 12/24/2024, repeat  trending down  Continue Abilify 30 mg daily for psychosis as he is resistant to single antipsychotic  Continue Lexapro 20 mg daily for depression and anxiety  Continue Robinul 1 mg BID and 2 mg QHS for sialorrhea from Clozapine  Continue Atropine 1% solution  1 drop sublingually QHS for adverse effects of Clozapine  Continue Propanolol 10 mg BID for anxiety   Continue Melatonin 9 mg QHS for sleep  Continue Senna-Docusate sodium one 50 mg tablet QHS for constipation    Pt remains on dual antipsychotic Tx due to failure on monotherapy   .  S/p ECT treatments.  - ACT team referral was made for him living back with his aunt once MA funding comes through from the [facility]    No associated orders from this encounter found during lookback period of 72 hours.

## 2024-12-30 NOTE — ASSESSMENT & PLAN NOTE
Reviewed on 112/30/24  Follows with medical team and given dietary counseling and need for exercise      No associated orders from this encounter found during lookback period of 72 hours.

## 2024-12-30 NOTE — PLAN OF CARE
Problem: Alteration in Thoughts and Perception  Goal: Treatment Goal: Gain control of psychotic behaviors/thinking, reduce/eliminate presenting symptoms and demonstrate improved reality functioning upon discharge  Outcome: Progressing  Goal: Refrain from acting on delusional thinking/internal stimuli  Description: Interventions:  - Monitor patient closely, per order   - Utilize least restrictive measures   - Set reasonable limits, give positive feedback for acceptable   - Administer medications as ordered and monitor of potential side effects  Outcome: Progressing  Goal: Agree to be compliant with medication regime, as prescribed and report medication side effects  Description: Interventions:  - Offer appropriate PRN medication and supervise ingestion; conduct AIMS, as needed   Outcome: Progressing  Goal: Attend and participate in unit activities, including therapeutic, recreational, and educational groups  Description: Interventions:  -Encourage Visitation and family involvement in care  Outcome: Progressing  Goal: Recognize dysfunctional thoughts, communicate reality-based thoughts at the time of discharge  Description: Interventions:  - Provide medication and psycho-education to assist patient in compliance and developing insight into his/her illness   Outcome: Progressing  Goal: Complete daily ADLs, including personal hygiene independently, as able  Description: Interventions:  - Observe, teach, and assist patient with ADLS  - Monitor and promote a balance of rest/activity, with adequate nutrition and elimination   Outcome: Progressing     Problem: Ineffective Coping  Goal: Identifies ineffective coping skills  Outcome: Progressing  Goal: Identifies healthy coping skills  Outcome: Progressing  Goal: Demonstrates healthy coping skills  Outcome: Progressing  Goal: Participates in unit activities  Description: Interventions:  - Provide therapeutic environment   - Provide required programming   - Redirect  inappropriate behaviors   Outcome: Progressing  Goal: Patient/Family participate in treatment and DC plans  Description: Interventions:  - Provide therapeutic environment  Outcome: Progressing  Goal: Patient/Family verbalizes awareness of resources  Outcome: Progressing     Problem: Depression  Goal: Treatment Goal: Demonstrate behavioral control of depressive symptoms, verbalize feelings of improved mood/affect, and adopt new coping skills prior to discharge  Outcome: Progressing  Goal: Verbalize thoughts and feelings  Description: Interventions:  - Assess and re-assess patient's level of risk   - Engage patient in 1:1 interactions, daily, for a minimum of 15 minutes   - Encourage patient to express feelings, fears, frustrations, hopes   Outcome: Progressing  Goal: Refrain from harming self  Description: Interventions:  - Monitor patient closely, per order   - Supervise medication ingestion, monitor effects and side effects   Outcome: Progressing  Goal: Refrain from isolation  Description: Interventions:  - Develop a trusting relationship   - Encourage socialization   Outcome: Progressing  Goal: Refrain from self-neglect  Outcome: Progressing  Goal: Attend and participate in unit activities, including therapeutic, recreational, and educational groups  Description: Interventions:  - Provide therapeutic and educational activities daily, encourage attendance and participation, and document same in the medical record   Outcome: Progressing     Problem: Anxiety  Goal: Anxiety is at manageable level  Description: Interventions:  - Assess and monitor patient's anxiety level.   - Monitor for signs and symptoms (heart palpitations, chest pain, shortness of breath, headaches, nausea, feeling jumpy, restlessness, irritable, apprehensive).   - Collaborate with interdisciplinary team and initiate plan and interventions as ordered.  - [place] patient to unit/surroundings  - Explain treatment plan  - Encourage participation in  care  - Encourage verbalization of concerns/fears  - Identify coping mechanisms  - Assist in developing anxiety-reducing skills  - Administer/offer alternative therapies  - Limit or eliminate stimulants  Outcome: Progressing     Problem: Alteration in Orientation  Goal: Treatment Goal: Demonstrate a reduction of confusion and improved orientation to person, place, time and/or situation upon discharge, according to optimum baseline/ability  Outcome: Progressing  Goal: Interact with staff daily  Description: Interventions:  - Assess and re-assess patient's level of orientation  - Engage patient in 1 on 1 interactions, daily, for a minimum of 15 minutes   - Establish rapport/trust with patient   Outcome: Progressing  Goal: Express concerns related to confused thinking related to:  Description: Interventions:  - Encourage patient to express feelings, fears, frustrations, hopes  - Assign consistent caregivers   - [place]/re-orient patient as needed  - Allow comfort items, as appropriate  - Provide visual cues, signs, etc.   Outcome: Progressing  Goal: Allow medical examinations, as recommended  Description: Interventions:  - Provide physical/neurological exams and/or referrals, per provider   Outcome: Progressing  Goal: Cooperate with recommended testing/procedures  Description: Interventions:  - Determine need for ancillary testing  - Observe for mental status changes  - Implement falls/precaution protocol   Outcome: Progressing  Goal: Complete daily ADLs, including personal hygiene independently, as able  Description: Interventions:  - Observe, teach, and assist patient with ADLS  Outcome: Progressing     Problem: DISCHARGE PLANNING - CARE MANAGEMENT  Goal: Discharge to post-acute care or home with appropriate resources  Description: INTERVENTIONS:  - Conduct assessment to determine patient/family and health care team treatment goals, and need for post-acute services based on payer coverage, community resources, and  patient preferences, and barriers to discharge  - Address psychosocial, clinical, and financial barriers to discharge as identified in assessment in conjunction with the patient/family and health care team  - Arrange appropriate level of post-acute services according to patient's   needs and preference and payer coverage in collaboration with the physician and health care team  - Communicate with and update the patient/family, physician, and health care team regarding progress on the discharge plan  - Arrange appropriate transportation to post-acute venues  Outcome: Progressing

## 2024-12-30 NOTE — NURSING NOTE
Alberto has not had any issues or behaviors. He appeared to be asleep since the beginning of the shift. Continue to monitor. Precautions maintained.

## 2024-12-30 NOTE — SOCIAL WORK
This writer spoke with patient's sisters Marleny and Ginger regarding patient's treatment and current status of ACT services. This writer met with patient and discussed his family visit. Patient discussed he would like a virtual visit with his sister in law and her father. This writer discussed patient's upcoming visit with his sister to apply for his MA benefits.

## 2024-12-31 PROCEDURE — 99232 SBSQ HOSP IP/OBS MODERATE 35: CPT | Performed by: PSYCHIATRY & NEUROLOGY

## 2024-12-31 RX ADMIN — LORATADINE 10 MG: 10 TABLET ORAL at 09:00

## 2024-12-31 RX ADMIN — GLYCOPYRROLATE 1 MG: 1 TABLET ORAL at 14:36

## 2024-12-31 RX ADMIN — DOCUSATE SODIUM 100 MG: 100 CAPSULE, LIQUID FILLED ORAL at 17:09

## 2024-12-31 RX ADMIN — CLOZAPINE 250 MG: 25 TABLET ORAL at 21:21

## 2024-12-31 RX ADMIN — HYDROCHLOROTHIAZIDE 12.5 MG: 12.5 TABLET ORAL at 09:00

## 2024-12-31 RX ADMIN — DOCUSATE SODIUM 100 MG: 100 CAPSULE, LIQUID FILLED ORAL at 09:00

## 2024-12-31 RX ADMIN — SENNOSIDES AND DOCUSATE SODIUM 2 TABLET: 50; 8.6 TABLET ORAL at 21:22

## 2024-12-31 RX ADMIN — AMLODIPINE BESYLATE 5 MG: 5 TABLET ORAL at 09:00

## 2024-12-31 RX ADMIN — PROPRANOLOL HYDROCHLORIDE 10 MG: 10 TABLET ORAL at 09:00

## 2024-12-31 RX ADMIN — ARIPIPRAZOLE 30 MG: 15 TABLET ORAL at 09:00

## 2024-12-31 RX ADMIN — NICOTINE POLACRILEX 2 MG: 2 GUM, CHEWING ORAL at 21:27

## 2024-12-31 RX ADMIN — PANTOPRAZOLE SODIUM 20 MG: 20 TABLET, DELAYED RELEASE ORAL at 09:01

## 2024-12-31 RX ADMIN — NICOTINE POLACRILEX 2 MG: 2 GUM, CHEWING ORAL at 12:57

## 2024-12-31 RX ADMIN — GLYCOPYRROLATE 2 MG: 1 TABLET ORAL at 21:22

## 2024-12-31 RX ADMIN — NICOTINE POLACRILEX 2 MG: 2 GUM, CHEWING ORAL at 09:02

## 2024-12-31 RX ADMIN — MELATONIN TAB 3 MG 9 MG: 3 TAB at 21:22

## 2024-12-31 RX ADMIN — PROPRANOLOL HYDROCHLORIDE 10 MG: 10 TABLET ORAL at 21:22

## 2024-12-31 RX ADMIN — MULTIPLE VITAMINS W/ MINERALS TAB 1 TABLET: TAB ORAL at 09:00

## 2024-12-31 RX ADMIN — NICOTINE POLACRILEX 2 MG: 2 GUM, CHEWING ORAL at 17:09

## 2024-12-31 RX ADMIN — ATROPINE SULFATE 1 DROP: 10 SOLUTION/ DROPS OPHTHALMIC at 21:22

## 2024-12-31 RX ADMIN — ESCITALOPRAM OXALATE 20 MG: 10 TABLET, FILM COATED ORAL at 09:00

## 2024-12-31 RX ADMIN — GLYCOPYRROLATE 1 MG: 1 TABLET ORAL at 09:00

## 2024-12-31 NOTE — CASE MANAGEMENT
Patient requested virtual visit with his sister-in-law, Kalpana this evening at 1800. Treatment team in agreement with visit.  Video visit scheduled in [place] 1 tablet.

## 2024-12-31 NOTE — ASSESSMENT & PLAN NOTE
Reviewed on 12/31/24  No significant changes still with same threatening voices as before not commanding and planning to have a virtual visit with his sisters  Continue medication as follows:  Clozapine 250 mg QHS for psychosis - increased 12/11/24  To consider further careful titration  CBC w/diff and CK every 2 weeks  CK was increased to 960 on 12/12 and oral hydration encouraged, CK improved to 905 on 12/13/24. Repeat on 12/17/24 decreased to 674 and on 12/20/24 decreased to 448  Labs  drawn on 12/24/2024, repeat  trending down  Continue Abilify 30 mg daily for psychosis as he is resistant to single antipsychotic  Continue Lexapro 20 mg daily for depression and anxiety  Continue Robinul 1 mg BID and 2 mg QHS for sialorrhea from Clozapine  Continue Atropine 1% solution  1 drop sublingually QHS for adverse effects of Clozapine  Continue Propanolol 10 mg BID for anxiety   Continue Melatonin 9 mg QHS for sleep  Continue Senna-Docusate sodium one 50 mg tablet QHS for constipation    Pt remains on dual antipsychotic Tx due to failure on monotherapy   .  S/p ECT treatments.  - ACT team referral was made for him living back with his aunt once MA funding comes through from the [facility] and sister will try to reapply    No associated orders from this encounter found during lookback period of 72 hours.

## 2024-12-31 NOTE — ASSESSMENT & PLAN NOTE
Reviewed on 12/31/24  Follows with medical team      No associated orders from this encounter found during lookback period of 72 hours.   n/a

## 2024-12-31 NOTE — ASSESSMENT & PLAN NOTE
Reviewed on 12/31/24  Follows with medical team and given dietary counseling and need for exercise      No associated orders from this encounter found during lookback period of 72 hours.

## 2024-12-31 NOTE — ASSESSMENT & PLAN NOTE
Reviewed on 12/31/24  Follows with medical team  Continue senna-docusate sodium dose per medical team.     No associated orders from this encounter found during lookback period of 72 hours.

## 2024-12-31 NOTE — PROGRESS NOTES
12/31/24 0754   Team Meeting   Meeting Type Daily Rounds   Team Members Present   Team Members Present Physician;Nurse;;Other (Discipline and Name)   Physician Team Member Thomas, Holter   Nursing Team Member Claudia   Social Work Team Member Jose J ORTEGA   Patient/Family Present   Patient Present No   Patient's Family Present No     Groups Participation  8/9.   Patient's compliant with medications. He's engaged in his treatment. Virtual visit with family. He's cooperative and pleasant.

## 2024-12-31 NOTE — PLAN OF CARE
Problem: Alteration in Thoughts and Perception  Goal: Refrain from acting on delusional thinking/internal stimuli  Description: Interventions:  - Monitor patient closely, per order   - Utilize least restrictive measures   - Set reasonable limits, give positive feedback for acceptable   - Administer medications as ordered and monitor of potential side effects  Outcome: Progressing  Goal: Agree to be compliant with medication regime, as prescribed and report medication side effects  Description: Interventions:  - Offer appropriate PRN medication and supervise ingestion; conduct AIMS, as needed   Outcome: Progressing  Goal: Attend and participate in unit activities, including therapeutic, recreational, and educational groups  Description: Interventions:  -Encourage Visitation and family involvement in care  Outcome: Progressing  Goal: Complete daily ADLs, including personal hygiene independently, as able  Description: Interventions:  - Observe, teach, and assist patient with ADLS  - Monitor and promote a balance of rest/activity, with adequate nutrition and elimination   Outcome: Progressing     Problem: Ineffective Coping  Goal: Demonstrates healthy coping skills  Outcome: Progressing  Goal: Participates in unit activities  Description: Interventions:  - Provide therapeutic environment   - Provide required programming   - Redirect inappropriate behaviors   Outcome: Progressing     Problem: Depression  Goal: Verbalize thoughts and feelings  Description: Interventions:  - Assess and re-assess patient's level of risk   - Engage patient in 1:1 interactions, daily, for a minimum of 15 minutes   - Encourage patient to express feelings, fears, frustrations, hopes   Outcome: Progressing  Goal: Refrain from isolation  Description: Interventions:  - Develop a trusting relationship   - Encourage socialization   Outcome: Progressing  Goal: Refrain from self-neglect  Outcome: Progressing     Problem: Anxiety  Goal: Anxiety is at  manageable level  Description: Interventions:  - Assess and monitor patient's anxiety level.   - Monitor for signs and symptoms (heart palpitations, chest pain, shortness of breath, headaches, nausea, feeling jumpy, restlessness, irritable, apprehensive).   - Collaborate with interdisciplinary team and initiate plan and interventions as ordered.  - [place] patient to unit/surroundings  - Explain treatment plan  - Encourage participation in care  - Encourage verbalization of concerns/fears  - Identify coping mechanisms  - Assist in developing anxiety-reducing skills  - Administer/offer alternative therapies  - Limit or eliminate stimulants  Outcome: Progressing

## 2024-12-31 NOTE — NURSING NOTE
Pt is visible on the unit and social with select peers. Took medications without incidence. Pt is pleasant and cooperative. Attended 8/9 groups. Reports continued AH that are threatening, and anxiety and depression. Denies SI/HI/VH. No behavioral issues. Pt offers no new complaints.

## 2024-12-31 NOTE — NURSING NOTE
Pt is in bed all morning, refused breakfast. Pt is medication compliant, continues to reports AH. Pt denies SI/HI/AVH. Medication compliant, safety checks ongoing.

## 2024-12-31 NOTE — PROGRESS NOTES
Progress Note - Behavioral Health   Name: Alberto Berumen 29 y.o. male I MRN: [id number]  Unit/Bed#: [facility] 112-02 I Date of Admission: 3/29/2024   Date of Service: 12/31/2024 I Hospital Day: 277     Assessment & Plan  Schizoaffective disorder, bipolar type (HCC)  Reviewed on 12/31/24  No significant changes still with same threatening voices as before not commanding and planning to have a virtual visit with his sisters  Continue medication as follows:  Clozapine 250 mg QHS for psychosis - increased 12/11/24  To consider further careful titration  CBC w/diff and CK every 2 weeks  CK was increased to 960 on 12/12 and oral hydration encouraged, CK improved to 905 on 12/13/24. Repeat on 12/17/24 decreased to 674 and on 12/20/24 decreased to 448  Labs  drawn on 12/24/2024, repeat  trending down  Continue Abilify 30 mg daily for psychosis as he is resistant to single antipsychotic  Continue Lexapro 20 mg daily for depression and anxiety  Continue Robinul 1 mg BID and 2 mg QHS for sialorrhea from Clozapine  Continue Atropine 1% solution  1 drop sublingually QHS for adverse effects of Clozapine  Continue Propanolol 10 mg BID for anxiety   Continue Melatonin 9 mg QHS for sleep  Continue Senna-Docusate sodium one 50 mg tablet QHS for constipation    Pt remains on dual antipsychotic Tx due to failure on monotherapy   .  S/p ECT treatments.  - ACT team referral was made for him living back with his aunt once MA funding comes through from the [facility] and sister will try to reapply    No associated orders from this encounter found during lookback period of 72 hours.    Chronic idiopathic constipation  Reviewed on 12/31/24  Follows with medical team  Continue senna-docusate sodium dose per medical team.     No associated orders from this encounter found during lookback period of 72 hours.  GERD (gastroesophageal reflux disease)  Reviewed on 12/31/24  Follows with medical  Continue famotidine 20 mg tablet BID per  medical team   Continue glycopyrrolate 1 mg tablet BID AM and afternoon per medical team  Continue glycopyrrolate 2 mg tablet before bed per medical team  Continue pantoprazole EC 20 mg tablet every morning per medical team  This is a chronic condition, and is stable unless otherwise noted.    No associated orders from this encounter found during lookback period of 72 hours.  Tobacco abuse   Reviewed on 12/31/24  on nicotine gum as as needed and encouraged smoking cessation  Continue to encourage cessation upon discharge      No associated orders from this encounter found during lookback period of 72 hours.  Elevated hemoglobin A1c  Reviewed on 12/31/24  Follows with medical team      No associated orders from this encounter found during lookback period of 72 hours.  Class 2 obesity in adult  Reviewed on 12/31/24  Follows with medical team and given dietary counseling and need for exercise      No associated orders from this encounter found during lookback period of 72 hours.    Primary hypertension  Reviewed on 12/31/24  Follows with medical team on hydrochlorothiazide and Norvasc      No associated orders from this encounter found during lookback period of 72 hours.    Patient Active Problem List   Diagnosis    GERD (gastroesophageal reflux disease)    Schizoaffective disorder, bipolar type (HCC)    Tobacco abuse    T wave inversion in EKG    Syringoma    Chronic idiopathic constipation    Vitamin B 12 deficiency    Vitamin D deficiency    Confluent and reticulate papillomatosis    Class 2 obesity in adult    Primary hypertension    Elevated hemoglobin A1c    Bilateral lower extremity edema     Review of systems: Unremarkable again reminded to drink fluids   assessment  Overall Status: No significant changes  certification Statement: The patient will continue to require additional inpatient hospital stay due to ongoing voices that are threatening to mixing lack of response to medications and ECT until eligible for  an ACT team to live with his aunt.     Medications:  propranolol 10 mg every 12 hours  for anxiety, Abilify 30 mg mg at bedtime Lexapro 20 mg once a day,  and senna once a day day for constipation clozapine 250 mg at bedtime, Robinul 1 mg twice a day and 2 mg at bedtime for drooling from Clozapine, Colace 100 mg twice a day with MiraLAX daily for constipation from Clozapine    side effects from treatment: None reported  Medication changes   Clozapine increased to 250 mg on 12/11/2024 from 225 mg none today  medication education  Risks side effects benefits and precautions of medications discussed with patient and he did verbalize an understanding about risks for metabolic syndrome from being on neuroleptics and risk for tardive dyskinesia etc.   All meds reviewed  Understanding of medications: Has some understanding  Justification for dual anti-psychotics: due to not responding to single antipsychotics    Non-pharmacological treatments  Continue with individual, group, milieu and occupational therapy using recovery principles and psycho-education about accepting illness and the need for treatment.  Waiting for [facility] to fund an ACT team as he lost his MA benefits and to live with aunt in the [facility]  Maintenance ECTs  completed several months ago  Refuses to see a dietician and agrees to do more exercises as he is about 100 lbs overweight   Prolactin level high so Risperdal replaced with Abilify which he has tolerated well so far with prolactin level back to normal  Counseled to take the stool softeners and laxatives regularly  Dietary counseling given to watch diet and to exercise    Safety  Safety and communication plan established to target dynamic risk factors discussed above.    Discharge Plan back to his aunt in the [facility] once his MA benefits are reinstated with an ACT team    Interval Progress   No major issues or concerns other than still hearing same voices for the last 3 years that are threatening to  kill him and his family when he gets out which has been going on and never went away and he is trying to learn to live with them by punching the punching bag and talking with peers and distract himself attending groups.  Remains polite friendly pleasant and in good spirits when approached and continues to have some dysphoria over the chronic nature of the voices that are not going away but denies having had any suicidal thoughts at all.  Planning to meet virtually with his sisters and one of them is going to help him reapply for medical assistance again.  Has not been aggressive or agitated or threatening self-abusive with no need for behavioral PRNs and attending most of the groups  acceptance by patient: Accepting  Hopefulness in recovery: Living with aunt  Involved in reintegration process: Talking and meeting with sister's and aunt, had virtual vist with jie in law and her dad yesterday  trusting in relationship with psychiatrist: Trusting  Sleep: Good  Appetite: Good  Compliance with Medications: Compliant  Group attendance: 8 out of 9  Significant events and progress towards goals: Status quo    Mental Status Exam  Appearance: age appropriate, improved grooming, looks older than stated age, overweight, with hair in dreadlocks casually dressed with a clean shaven face, fairly groomed with good eye contact found pacing the hallway with good eye contact well-groomed, found laying on bed under the covers today when approached but easily aroused offering no complaints  behavior: Polite friendly pleasant better groomed with good eye contact  speech: normal rate, normal volume, normal pitch  Mood: dysphoric, anxious, continues to report feeling very good because of the voices that are never going away  affect: constricted, inappropriate, mood-congruent, with no good mood reactivity .    thought Process: organized, logical, coherent, goal directed, linear, decreased rate of thoughts, slowing of thoughts, negative  thinking, impaired abstract reasoning, concrete  Thought Content: paranoid ideation, some paranoia, grandiose ideas, intrusive thoughts, preoccupied, chronic, continues to report paranoia about people threatening to kill him and his family because of the voices.  He believes ECT has helped so that he is not much in his head.  No other delusions elicited.  No current suicidal or homicidal thoughts and no plans verbalized but today reports having passive death wishes because of voices with no plans and able to CFS and it has not changed yet  .  No phobias obsessions compulsions or distorted body perceptions reported.   Perceptual Disturbances: Same threatening voices to kill him and his family which he has been hearing for over 3 years   Hx Risk Factors: chronic psychiatric problems, chronic anxiety symptoms, chronic psychotic symptoms,    Sensorium: Oriented x 3 spheres and situation  Attention and concentration span: Intact  Cognition: recent and remote memory grossly intact  Consciousness: alert and awake  Intellect: appears to be of average intelligence  Insight: intact  Judgement: intact  Motor Activity: no abnormal movements     Vitals  Temp:  [97.5 °F (36.4 °C)-97.8 °F (36.6 °C)] 97.8 °F (36.6 °C)  HR:  [78-97] 97  Resp:  [17-18] 18  BP: (121-125)/(64-74) 121/74  SpO2:  [98 %-99 %] 99 %  No intake or output data in the 24 hours ending 12/31/24 0431          Lab Results:  today   Current Facility-Administered Medications   Medication Dose Route Frequency Provider Last Rate    acetaminophen  650 mg Oral Q4H PRN Jordan C Holter, DO      acetaminophen  650 mg Oral Q6H PRN HOLLI Lion      aluminum-magnesium hydroxide-simethicone  30 mL Oral Q4H PRN Jordan C Holter, DO      amLODIPine  5 mg Oral Daily HOLLI Lion      ARIPiprazole  30 mg Oral Daily Bora Rosario MD      Artificial Tears  1 drop Both Eyes Q3H PRN Jordan C Holter, DO      atropine  1 drop Sublingual HS Harjeet Torres DO       haloperidol lactate  2.5 mg Intramuscular Q4H PRN Max 4/day STEVE LionNP      And    LORazepam  1 mg Intramuscular Q4H PRN Max 4/day STEVE LionNP      And    benztropine  0.5 mg Intramuscular Q4H PRN Max 4/day Eveline Hunt, CRNP      benztropine  1 mg Intramuscular Q4H PRN Max 6/day Jordan C Holter, DO      haloperidol lactate  5 mg Intramuscular Q4H PRN Max 4/day STEVE LionNP      And    LORazepam  2 mg Intramuscular Q4H PRN Max 4/day Eveline Hunt CRNP      And    benztropine  1 mg Intramuscular Q4H PRN Max 4/day HOLLI Lion      benztropine  1 mg Oral Q4H PRN Max 6/day HOLLI Lion      benztropine  1 mg Oral Q4H PRN Max 6/day Jordan C Holter, DO      bisacodyl  10 mg Rectal Daily PRN HOLLI Lion      calcium carbonate  500 mg Oral BID PRN HOLLI Monge      cloZAPine  250 mg Oral HS Bora Rosario MD      hydrOXYzine HCL  50 mg Oral Q6H PRN Max 4/day Jordan C Holter, DO      Or    diphenhydrAMINE  50 mg Intramuscular Q6H PRN Jordan C Holter, DO      hydrOXYzine HCL  50 mg Oral Q6H PRN Max 4/day HOLLI Lion      Or    diphenhydrAMINE  50 mg Intramuscular Q6H PRN HOLLI Lion      diphenhydrAMINE-zinc acetate   Topical BID PRN HOLLI Lion      docusate sodium  100 mg Oral BID Jourdan Dickens, DO      escitalopram  20 mg Oral Daily HOLLI Lion      famotidine  20 mg Oral BID PRN HOLLI Monge      fluticasone  1 spray Each Nare Daily PRN HOLLI Monge      glycopyrrolate  1 mg Oral BID (AM & Afternoon) Bora Rosario MD      glycopyrrolate  2 mg Oral HS Bora Rosario MD      haloperidol  1 mg Oral Q6H PRN HOLLI Lion      haloperidol  2.5 mg Oral Q4H PRN Max 4/day HOLLI Lion      haloperidol  5 mg Oral Q4H PRN Max 4/day HOLLI Lion      hydroCHLOROthiazide  12.5 mg Oral Daily HOLLI Galvan      hydrocortisone   Topical 4x Daily PRN HOLLI Lion      hydrOXYzine HCL   100 mg Oral Q6H PRN Max 4/day [facility] Holter, DO      Or    LORazepam  2 mg Intramuscular Q6H PRN [facility] Cresencioter, DO      hydrOXYzine HCL  100 mg Oral Q6H PRN Max 4/day HOLLI Lion      Or    LORazepam  2 mg Intramuscular Q6H PRN HOLLI Lion      hydrOXYzine HCL  25 mg Oral Q6H PRN Max 4/day [facility] Holter, DO      ibuprofen  600 mg Oral Q8H PRN HOLLI Lion      influenza vaccine  0.5 mL Intramuscular Once Bora Rosario MD      loratadine  10 mg Oral Daily Brina Guillen MD      magnesium hydroxide  30 mL Oral Once Jourdan Dickens, DO      melatonin  3 mg Oral HS PRN Bora Rosario MD      melatonin  9 mg Oral HS Bora Rosario MD      methocarbamol  500 mg Oral Q6H PRN HOLLI Lion      multivitamin-minerals  1 tablet Oral Daily Eileen CherryHOLLI Jimenez      nicotine polacrilex  2 mg Oral Q4H PRN Bora Rosario MD      OLANZapine  5 mg Oral Q4H PRN Max 3/day [facility] Holter, DO      Or    OLANZapine  2.5 mg Intramuscular Q4H PRN Max 3/day [facility] Holter, DO      OLANZapine  5 mg Oral Q3H PRN Max 3/day [facility] Holter, DO      Or    OLANZapine  5 mg Intramuscular Q3H PRN Max 3/day [facility] Holter, DO      OLANZapine  2.5 mg Oral Q4H PRN Max 6/day [facility] Holter, DO      ondansetron  4 mg Oral Q6H PRN HOLLI Lion      pantoprazole  [facility]      polyethylene glycol  17 g Oral Daily PRN HOLLI Ghotra      polyethylene glycol  17 g Oral Daily Jourdan Dickens, DO      propranolol  10 mg Oral Q12H KAYLIE HOLLI Lion      senna-docusate sodium  1 tablet Oral Daily PRN Ion  Holter, DO      senna-docusate sodium  2 tablet Oral HS Jourdan Dickens, DO      traZODone  50 mg Oral HS PRN HOLLI Lion      white petrolatum-mineral oil   Topical TID PRN HOLLI Lion         Counseling / Coordination of Care: Total floor / unit time spent today 15 minutes. Greater than 50% of total time was spent with the patient and / or family counseling  and / or somewhat receptive to supportive listening and teaching positive coping skills to deal with symptom mangement.     Patient's Rights, confidentiality and exceptions to confidentiality, use of automated medical record, Behavioral Health Services staff access to medical record, and consent to treatment reviewed.    This note has been dictated and hence there may be problems with punctuation, spelling and formatting and if anyone has any concerns please address them to Dr. Rosario   This note is not shared with patient due to potential for making patient's condition worse by knowing the content of the note.  Ms. Reese occasionally medications occasionally but that she is not happy

## 2024-12-31 NOTE — ASSESSMENT & PLAN NOTE
Reviewed on 12/31/24  Follows with medical team on hydrochlorothiazide and Norvasc      No associated orders from this encounter found during lookback period of 72 hours.

## 2024-12-31 NOTE — NURSING NOTE
Pt in bed asleep, observed eyes closed, and chest movement noted. 15 minute safety checks maintained. No unmet needs at this time. Will continue to monitor patient needs, sleep pattern, and behaviors.     0625: Patient has slept all night, 7+ hours of uninterrupted sleep

## 2024-12-31 NOTE — ASSESSMENT & PLAN NOTE
Reviewed on 12/31/24  on nicotine gum as as needed and encouraged smoking cessation  Continue to encourage cessation upon discharge      No associated orders from this encounter found during lookback period of 72 hours.

## 2024-12-31 NOTE — ASSESSMENT & PLAN NOTE
Reviewed on 12/31/24  Follows with medical  Continue famotidine 20 mg tablet BID per medical team   Continue glycopyrrolate 1 mg tablet BID AM and afternoon per medical team  Continue glycopyrrolate 2 mg tablet before bed per medical team  Continue pantoprazole EC 20 mg tablet every morning per medical team  This is a chronic condition, and is stable unless otherwise noted.    No associated orders from this encounter found during lookback period of 72 hours.

## 2025-01-01 PROCEDURE — 99232 SBSQ HOSP IP/OBS MODERATE 35: CPT

## 2025-01-01 RX ADMIN — NICOTINE POLACRILEX 2 MG: 2 GUM, CHEWING ORAL at 17:10

## 2025-01-01 RX ADMIN — NICOTINE POLACRILEX 2 MG: 2 GUM, CHEWING ORAL at 12:33

## 2025-01-01 RX ADMIN — DOCUSATE SODIUM 100 MG: 100 CAPSULE, LIQUID FILLED ORAL at 17:10

## 2025-01-01 RX ADMIN — GLYCOPYRROLATE 2 MG: 1 TABLET ORAL at 21:24

## 2025-01-01 RX ADMIN — GLYCOPYRROLATE 1 MG: 1 TABLET ORAL at 08:45

## 2025-01-01 RX ADMIN — PANTOPRAZOLE SODIUM 20 MG: 20 TABLET, DELAYED RELEASE ORAL at 08:45

## 2025-01-01 RX ADMIN — AMLODIPINE BESYLATE 5 MG: 5 TABLET ORAL at 08:45

## 2025-01-01 RX ADMIN — MULTIPLE VITAMINS W/ MINERALS TAB 1 TABLET: TAB ORAL at 08:45

## 2025-01-01 RX ADMIN — ATROPINE SULFATE 1 DROP: 10 SOLUTION/ DROPS OPHTHALMIC at 21:27

## 2025-01-01 RX ADMIN — ARIPIPRAZOLE 30 MG: 15 TABLET ORAL at 08:45

## 2025-01-01 RX ADMIN — CLOZAPINE 250 MG: 25 TABLET ORAL at 21:24

## 2025-01-01 RX ADMIN — GLYCOPYRROLATE 1 MG: 1 TABLET ORAL at 14:32

## 2025-01-01 RX ADMIN — SENNOSIDES AND DOCUSATE SODIUM 2 TABLET: 50; 8.6 TABLET ORAL at 21:24

## 2025-01-01 RX ADMIN — MELATONIN TAB 3 MG 9 MG: 3 TAB at 21:24

## 2025-01-01 RX ADMIN — HYDROCHLOROTHIAZIDE 12.5 MG: 12.5 TABLET ORAL at 08:45

## 2025-01-01 RX ADMIN — NICOTINE POLACRILEX 2 MG: 2 GUM, CHEWING ORAL at 21:24

## 2025-01-01 RX ADMIN — PROPRANOLOL HYDROCHLORIDE 10 MG: 10 TABLET ORAL at 08:45

## 2025-01-01 RX ADMIN — LORATADINE 10 MG: 10 TABLET ORAL at 08:45

## 2025-01-01 RX ADMIN — ESCITALOPRAM OXALATE 20 MG: 10 TABLET, FILM COATED ORAL at 08:45

## 2025-01-01 RX ADMIN — PROPRANOLOL HYDROCHLORIDE 10 MG: 10 TABLET ORAL at 21:24

## 2025-01-01 RX ADMIN — DOCUSATE SODIUM 100 MG: 100 CAPSULE, LIQUID FILLED ORAL at 08:45

## 2025-01-01 NOTE — NURSING NOTE
Pt in bed quietly resting, eyes closed, chest movement noted, 15 min safety checks doen thru thr ight, will continue to monitor pt needs, sleep pattern and behaviors.

## 2025-01-01 NOTE — PROGRESS NOTES
Progress Note - Behavioral Health   Name: Alberto Berumen 29 y.o. male I MRN: [id number]   Unit/Bed#: [facility] 112-02 I Date of Admission: 3/29/2024   Date of Service: 1/1/2025 I Hospital Day: 278         Assessment & Plan  Schizoaffective disorder, bipolar type (HCC)  Reviewed on 1/1/25  No significant changes still with same threatening voices as before not commanding and planning to have a virtual visit with his sisters  Continue medication as follows:  Clozapine 250 mg QHS for psychosis - increased 12/11/24  To consider further careful titration  CBC w/diff and CK every 2 weeks  CK was increased to 960 on 12/12 and oral hydration encouraged, CK improved to 905 on 12/13/24. Repeat on 12/17/24 decreased to 674 and on 12/20/24 decreased to 448  Labs  drawn on 12/24/2024, repeat  trending down  Continue Abilify 30 mg daily for psychosis as he is resistant to single antipsychotic  Continue Lexapro 20 mg daily for depression and anxiety  Continue Robinul 1 mg BID and 2 mg QHS for sialorrhea from Clozapine  Continue Atropine 1% solution  1 drop sublingually QHS for adverse effects of Clozapine  Continue Propanolol 10 mg BID for anxiety   Continue Melatonin 9 mg QHS for sleep  Continue Senna-Docusate sodium one 50 mg tablet QHS for constipation    Pt remains on dual antipsychotic Tx due to failure on monotherapy   .  S/p ECT treatments.  - ACT team referral was made for him living back with his aunt once MA funding comes through from the [facility] and sister will try to reapply    No associated orders from this encounter found during lookback period of 72 hours.    Chronic idiopathic constipation  Reviewed on 1/1/25  Follows with medical team  Continue senna-docusate sodium dose per medical team.     No associated orders from this encounter found during lookback period of 72 hours.  GERD (gastroesophageal reflux disease)  Reviewed on 1/1/25  Follows with medical  Continue famotidine 20 mg tablet BID per  medical team   Continue glycopyrrolate 1 mg tablet BID AM and afternoon per medical team  Continue glycopyrrolate 2 mg tablet before bed per medical team  Continue pantoprazole EC 20 mg tablet every morning per medical team  This is a chronic condition, and is stable unless otherwise noted.    No associated orders from this encounter found during lookback period of 72 hours.  Tobacco abuse   Reviewed on 1/1/25  on nicotine gum as as needed and encouraged smoking cessation  Continue to encourage cessation upon discharge      No associated orders from this encounter found during lookback period of 72 hours.  Elevated hemoglobin A1c  Reviewed on 1/1/25  Follows with medical team      No associated orders from this encounter found during lookback period of 72 hours.  Class 2 obesity in adult  Reviewed on 1/1/25  Follows with medical team and given dietary counseling and need for exercise      No associated orders from this encounter found during lookback period of 72 hours.    Primary hypertension  Reviewed on 1/1/25  Follows with medical team on hydrochlorothiazide and Norvasc      No associated orders from this encounter found during lookback period of 72 hours.        Recommended Treatment:   Treatment plan and medication changes discussed and per the attending physician the plan is:     1.Continue with group therapy, milieu therapy and occupational therapy  2.Behavioral Health checks every 15 minutes  3.Continue frequent safety checks and vitals per unit protocol  4.Continue with SLIM medical management as indicated  5.Continue with current medication regimen  6.Will review labs in the a.m.  7.Disposition Planning: Discharge planning and efforts remain ongoing     Planned medication and treatment changes:    All current active medications have been reviewed  Encourage group therapy, milieu therapy and occupational therapy  Behavioral Health checks for safety monitoring  Continue treatment with group therapy, milieu  therapy and occupational therapy  Discharge planning  ACT referral  Patient is awaiting [facility] funding  Continue current medications:    Current medications:  Current Facility-Administered Medications   Medication Dose Route Frequency Provider Last Rate    acetaminophen  650 mg Oral Q4H PRN Jordan C Holter, DO      acetaminophen  650 mg Oral Q6H PRN HOLLI Lion      aluminum-magnesium hydroxide-simethicone  30 mL Oral Q4H PRN Jordan C Holter, DO      amLODIPine  5 mg Oral Daily HOLLI Lion      ARIPiprazole  30 mg Oral Daily Bora Rosario MD      Artificial Tears  1 drop Both Eyes Q3H PRN Jordan C Holter, DO      atropine  1 drop Sublingual HS Harjeet Torres,       haloperidol lactate  2.5 mg Intramuscular Q4H PRN Max 4/day HOLLI Lion      And    LORazepam  1 mg Intramuscular Q4H PRN Max 4/day HOLLI Lion      And    benztropine  0.5 mg Intramuscular Q4H PRN Max 4/day HOLLI Lion      benztropine  1 mg Intramuscular Q4H PRN Max 6/day Jordan C Holter, DO      haloperidol lactate  5 mg Intramuscular Q4H PRN Max 4/day HOLLI Lion      And    LORazepam  2 mg Intramuscular Q4H PRN Max 4/day HOLLI Lion      And    benztropine  1 mg Intramuscular Q4H PRN Max 4/day HOLLI Lion      benztropine  1 mg Oral Q4H PRN Max 6/day HOLLI Lion      benztropine  1 mg Oral Q4H PRN Max 6/day Jordan C Holter, DO      bisacodyl  10 mg Rectal Daily PRN HOLLI Lion      calcium carbonate  500 mg Oral BID PRN HOLLI Monge      cloZAPine  250 mg Oral HS Bora Rosario MD      hydrOXYzine HCL  50 mg Oral Q6H PRN Max 4/day Jordan C Holter, DO      Or    diphenhydrAMINE  50 mg Intramuscular Q6H PRN Jordan C Holter, DO      hydrOXYzine HCL  50 mg Oral Q6H PRN Max 4/day HOLLI Lion      Or    diphenhydrAMINE  50 mg Intramuscular Q6H PRN HOLLI Lion      diphenhydrAMINE-zinc acetate   Topical BID PRN HOLLI Lion      docusate sodium  100  mg Oral BID Jourdan Dickens, DO      escitalopram  20 mg Oral Daily HOLLI Lion      famotidine  20 mg Oral BID PRN HOLLI Monge      fluticasone  1 spray Each Nare Daily PRN HOLLI Monge      glycopyrrolate  1 mg Oral BID (AM & Afternoon) Bora Rosario MD      glycopyrrolate  2 mg Oral HS Bora Rosario MD      haloperidol  1 mg Oral Q6H PRN HOLLI Lion      haloperidol  2.5 mg Oral Q4H PRN Max 4/day HOLLI Lion      haloperidol  5 mg Oral Q4H PRN Max 4/day HOLLI Lion      hydroCHLOROthiazide  12.5 mg Oral Daily HOLLI Galvan      hydrocortisone   Topical 4x Daily PRN HOLLI Lion      hydrOXYzine HCL  100 mg Oral Q6H PRN Max 4/day Ion C Holter, DO      Or    LORazepam  2 mg Intramuscular Q6H PRN Ion C Holter, DO      hydrOXYzine HCL  100 mg Oral Q6H PRN Max 4/day HOLLI Lion      Or    LORazepam  2 mg Intramuscular Q6H PRN HOLLI Lion      hydrOXYzine HCL  25 mg Oral Q6H PRN Max 4/day Ion C Holter, DO      ibuprofen  600 mg Oral Q8H PRN HOLLI Lion      influenza vaccine  0.5 mL Intramuscular Once Bora Rosario MD      loratadine  10 mg Oral Daily Brina Guillen MD      magnesium hydroxide  30 mL Oral Once Jourdan Dickens, DO      melatonin  3 mg Oral HS PRN Bora Rosario MD      melatonin  9 mg Oral HS Bora Rosario MD      methocarbamol  500 mg Oral Q6H PRN HOLLI Lion      multivitamin-minerals  1 tablet Oral Daily HOLLI Monge      nicotine polacrilex  2 mg Oral Q4H PRN Bora Rosario MD      OLANZapine  5 mg Oral Q4H PRN Max 3/day Ion C Holter, DO      Or    OLANZapine  2.5 mg Intramuscular Q4H PRN Max 3/day Ion C Holter, DO      OLANZapine  5 mg Oral Q3H PRN Max 3/day Ion C Holter, DO      Or    OLANZapine  5 mg Intramuscular Q3H PRN Max 3/day Ion C Holter, DO      OLANZapine  2.5 mg Oral Q4H PRN Max 6/day Jordan C Holter, DO      ondansetron  4 mg Oral Q6H PRN  "HOLLI Lion      pantoprazole  20 mg Oral QAM Jordan C Holter, DO      polyethylene glycol  17 g Oral Daily PRN Sofi HOLLI Yoo      polyethylene glycol  17 g Oral Daily Jourdan Dickens, DO      propranolol  10 mg Oral Q12H [facility] HOLLI Lion      senna-docusate sodium  1 tablet Oral Daily PRN Jordan C Holter, DO      senna-docusate sodium  2 tablet Oral HS Jourdan Dickens, DO      traZODone  50 mg Oral HS PRN HOLLI Lion      white petrolatum-mineral oil   Topical TID PRN HOLLI Lion         Risks / Benefits of Treatment:    Risks, benefits, and possible side effects of medications explained to patient and patient verbalizes understanding and agreement for treatment.    Subjective:    Behavior over the last 24 hours: unchanged.     Per staff Alberto was visible in the milieu yesterday and social with select peers.  He has been compliant with his psychiatric medications.  He is generally polite pleasant and cooperative with staff.  He continues to report auditory hallucinations.    Alberto was seen in his room today for psychiatric follow-up.  He reports today that his auditory hallucinations are \"the same\".  He reports hallucinations are of voices threatening to kill his family they are not command in nature.  He is denying any side effects from his medications and none were observed on exam.  He is denying any suicidal or homicidal ideation intent or plan today.  He is denying any visual hallucinations at this time.  He reports that he has been using the gym daily and has been attending groups.    Sleep: normal  Appetite: normal  Medication side effects: No   ROS: no complaints    Mental Status Evaluation:    Appearance:  disheveled, marginal hygiene   Behavior:  calm, superficially cooperative   Speech:  normal rate and volume   Mood:  dysphoric   Affect:  blunted   Thought Process:  concrete   Associations: concrete associations   Thought Content:  paranoid ideation   Perceptual " Disturbances: auditory hallucinations of voices threatening to kill his family, he denies any visual hallucinations   Risk Potential: Suicidal ideation - None  Homicidal ideation - None  Potential for aggression - No   Sensorium:  oriented to person, place, and time/date   Memory:  recent memory intact   Consciousness:  alert and awake   Attention/Concentration: attention span and concentration appear shorter than expected for age   Insight:  limited   Judgment: limited   Gait/Station: in bed   Motor Activity: no abnormal movements     Vital signs in last 24 hours:    Temp:  [97.6 °F (36.4 °C)-97.8 °F (36.6 °C)] 97.6 °F (36.4 °C)  HR:  [80-95] 86  BP: (119-132)/(52-77) 129/71  Resp:  [18] 18  SpO2:  [97 %-98 %] 98 %  O2 Device: None (Room air)         Laboratory results: I have personally reviewed all pertinent laboratory/tests results    Results from the past 24 hours: No results found for this or any previous visit (from the past 24 hours).  Most Recent Labs:   Lab Results   Component Value Date    WBC 8.66 12/24/2024    RBC 5.23 12/24/2024    HGB 12.5 12/24/2024    HCT 41.1 12/24/2024     12/24/2024    RDW 14.3 12/24/2024    NEUTROABS 3.84 12/24/2024    SODIUM 140 10/16/2024    K 3.8 10/16/2024     10/16/2024    CO2 29 10/16/2024    BUN 9 10/16/2024    CREATININE 0.91 10/16/2024    GLUC 94 10/16/2024    CALCIUM 9.5 10/16/2024    AST 26 09/30/2024    ALT 42 09/30/2024    ALKPHOS 64 09/30/2024    TP 6.6 09/30/2024    ALB 3.8 09/30/2024    TBILI 0.47 09/30/2024    CHOLESTEROL 156 03/14/2024    HDL 44 03/14/2024    TRIG 133 03/14/2024    LDLCALC 85 03/14/2024    NONHDLC 112 03/14/2024    LITHIUM 0.61 01/09/2024    EIF9HSMWJAJM 1.062 11/15/2023    SYPHILISAB Non-reactive 11/18/2023    HGBA1C 5.0 04/01/2024    EAG 97 04/01/2024       Suicide/Homicide Risk Assessment:    Risk of Harm to Self:   Nursing Suicide Risk Assessment Last 24 hours: C-SSRS Risk (Since Last Contact)  Calculated C-SSRS Risk Score  (Since Last Contact): No Risk Indicated  Current Specific Risk Factors include: mental illness diagnosis  Protective Factors: no current suicidal ideation, ability to communicate with staff on the unit, able to contract for safety on the unit, taking medications as ordered on the unit, ability to adapt to change, improved psychotic symptoms, responds to redirection  Based on today's assessment, Alberto presents the following risk of harm to self: low    Risk of Harm to Others:  Nursing Homicide Risk Assessment: Violence Risk to Others: Denies within past 6 months  Current Specific Risk Factors include: social difficulties  Protective Factors: no current homicidal ideation, able to communicate with staff on the unit, improved impulse control, psychotic symptoms are less prominent, compliant with medications on the unit as ordered, follows staff redirection  Based on today's assessment, Alberto presents the following risk of harm to others: low    The following interventions are recommended: Behavioral Health checks for safety monitoring, continued hospitalization on locked unit    Progress Toward Goals: progressing, attends groups, participates in milieu therapy, mood is stabilizing, discharge planning, patient to move back with her aunt with an ACT team once MA funding approved    Counseling / Coordination of Care:    Total floor / unit time spent today 30 minutes. Greater than 50% of total time was spent with the patient and / or family counseling and / or coordination of care. A description of counseling / coordination of care:    Administrative Statements   Topics discussed with the patient / family include symptom assessment and management.    HOLLI Quinteros 01/01/25

## 2025-01-01 NOTE — ASSESSMENT & PLAN NOTE
Reviewed on 1/1/25  Follows with medical  Continue famotidine 20 mg tablet BID per medical team   Continue glycopyrrolate 1 mg tablet BID AM and afternoon per medical team  Continue glycopyrrolate 2 mg tablet before bed per medical team  Continue pantoprazole EC 20 mg tablet every morning per medical team  This is a chronic condition, and is stable unless otherwise noted.    No associated orders from this encounter found during lookback period of 72 hours.

## 2025-01-01 NOTE — NURSING NOTE
Pt is calm and cooperative, visible on the unit. Pt continues to report AH. Pt ate 100% of dinner. Pt is medication compliant, safety checks ongoing.

## 2025-01-01 NOTE — ASSESSMENT & PLAN NOTE
Reviewed on 1/1/25  on nicotine gum as as needed and encouraged smoking cessation  Continue to encourage cessation upon discharge      No associated orders from this encounter found during lookback period of 72 hours.

## 2025-01-01 NOTE — ASSESSMENT & PLAN NOTE
Reviewed on 1/1/25  Follows with medical team on hydrochlorothiazide and Norvasc      No associated orders from this encounter found during lookback period of 72 hours.

## 2025-01-01 NOTE — ASSESSMENT & PLAN NOTE
Reviewed on 1/1/25  No significant changes still with same threatening voices as before not commanding and planning to have a virtual visit with his sisters  Continue medication as follows:  Clozapine 250 mg QHS for psychosis - increased 12/11/24  To consider further careful titration  CBC w/diff and CK every 2 weeks  CK was increased to 960 on 12/12 and oral hydration encouraged, CK improved to 905 on 12/13/24. Repeat on 12/17/24 decreased to 674 and on 12/20/24 decreased to 448  Labs  drawn on 12/24/2024, repeat  trending down  Continue Abilify 30 mg daily for psychosis as he is resistant to single antipsychotic  Continue Lexapro 20 mg daily for depression and anxiety  Continue Robinul 1 mg BID and 2 mg QHS for sialorrhea from Clozapine  Continue Atropine 1% solution  1 drop sublingually QHS for adverse effects of Clozapine  Continue Propanolol 10 mg BID for anxiety   Continue Melatonin 9 mg QHS for sleep  Continue Senna-Docusate sodium one 50 mg tablet QHS for constipation    Pt remains on dual antipsychotic Tx due to failure on monotherapy   .  S/p ECT treatments.  - ACT team referral was made for him living back with his aunt once MA funding comes through from the [facility] and sister will try to reapply    No associated orders from this encounter found during lookback period of 72 hours.

## 2025-01-01 NOTE — PLAN OF CARE
Problem: Alteration in Thoughts and Perception  Goal: Treatment Goal: Gain control of psychotic behaviors/thinking, reduce/eliminate presenting symptoms and demonstrate improved reality functioning upon discharge  Outcome: Progressing     Problem: Ineffective Coping  Goal: Identifies ineffective coping skills  Outcome: Progressing     Problem: Depression  Goal: Treatment Goal: Demonstrate behavioral control of depressive symptoms, verbalize feelings of improved mood/affect, and adopt new coping skills prior to discharge  Outcome: Progressing

## 2025-01-01 NOTE — NURSING NOTE
Pt is visible in the milieu and social with select peers. He consumed 100 % of dinner. Took his medications without incidence. Pt is polite, pleasant, and cooperative. Brightens on approach. Pt reports AH of voices saying they will kill him and his family. Denies all other psychiatric symptoms. No unmet needs. No behavioral issues.

## 2025-01-01 NOTE — ASSESSMENT & PLAN NOTE
Reviewed on 1/1/25  Follows with medical team  Continue senna-docusate sodium dose per medical team.     No associated orders from this encounter found during lookback period of 72 hours.

## 2025-01-01 NOTE — ASSESSMENT & PLAN NOTE
Reviewed on 1/1/25  Follows with medical team and given dietary counseling and need for exercise      No associated orders from this encounter found during lookback period of 72 hours.

## 2025-01-01 NOTE — ASSESSMENT & PLAN NOTE
Reviewed on 1/1/25  Follows with medical team      No associated orders from this encounter found during lookback period of 72 hours.

## 2025-01-02 PROCEDURE — 99232 SBSQ HOSP IP/OBS MODERATE 35: CPT | Performed by: PSYCHIATRY & NEUROLOGY

## 2025-01-02 RX ADMIN — HYDROCHLOROTHIAZIDE 12.5 MG: 12.5 TABLET ORAL at 08:45

## 2025-01-02 RX ADMIN — ARIPIPRAZOLE 30 MG: 15 TABLET ORAL at 08:45

## 2025-01-02 RX ADMIN — CLOZAPINE 250 MG: 25 TABLET ORAL at 21:10

## 2025-01-02 RX ADMIN — SENNOSIDES AND DOCUSATE SODIUM 2 TABLET: 50; 8.6 TABLET ORAL at 21:10

## 2025-01-02 RX ADMIN — MULTIPLE VITAMINS W/ MINERALS TAB 1 TABLET: TAB ORAL at 08:46

## 2025-01-02 RX ADMIN — NICOTINE POLACRILEX 2 MG: 2 GUM, CHEWING ORAL at 21:19

## 2025-01-02 RX ADMIN — GLYCOPYRROLATE 1 MG: 1 TABLET ORAL at 14:17

## 2025-01-02 RX ADMIN — NICOTINE POLACRILEX 2 MG: 2 GUM, CHEWING ORAL at 12:42

## 2025-01-02 RX ADMIN — AMLODIPINE BESYLATE 5 MG: 5 TABLET ORAL at 08:45

## 2025-01-02 RX ADMIN — ESCITALOPRAM OXALATE 20 MG: 10 TABLET, FILM COATED ORAL at 08:46

## 2025-01-02 RX ADMIN — ATROPINE SULFATE 1 DROP: 10 SOLUTION/ DROPS OPHTHALMIC at 21:19

## 2025-01-02 RX ADMIN — GLYCOPYRROLATE 1 MG: 1 TABLET ORAL at 08:45

## 2025-01-02 RX ADMIN — GLYCOPYRROLATE 2 MG: 1 TABLET ORAL at 21:10

## 2025-01-02 RX ADMIN — LORATADINE 10 MG: 10 TABLET ORAL at 08:45

## 2025-01-02 RX ADMIN — PANTOPRAZOLE SODIUM 20 MG: 20 TABLET, DELAYED RELEASE ORAL at 08:46

## 2025-01-02 RX ADMIN — NICOTINE POLACRILEX 2 MG: 2 GUM, CHEWING ORAL at 17:19

## 2025-01-02 RX ADMIN — PROPRANOLOL HYDROCHLORIDE 10 MG: 10 TABLET ORAL at 21:11

## 2025-01-02 RX ADMIN — PROPRANOLOL HYDROCHLORIDE 10 MG: 10 TABLET ORAL at 08:45

## 2025-01-02 RX ADMIN — MELATONIN TAB 3 MG 9 MG: 3 TAB at 21:10

## 2025-01-02 RX ADMIN — DOCUSATE SODIUM 100 MG: 100 CAPSULE, LIQUID FILLED ORAL at 08:46

## 2025-01-02 RX ADMIN — DOCUSATE SODIUM 100 MG: 100 CAPSULE, LIQUID FILLED ORAL at 17:11

## 2025-01-02 NOTE — ASSESSMENT & PLAN NOTE
Reviewed on 01/02/25  No significant changes still with same threatening voices as before not commanding and planning to have a virtual visit with his sisters  Continue medication as follows:  Continue Clozapine 250 mg QHS for psychosis - increased 12/11/24  To consider further careful titration  CBC w/diff and CK every 2 weeks  CK was increased to 960 on 12/12 and oral hydration encouraged, CK improved to 905 on 12/13/24. Repeat on 12/17/24 decreased to 674 and on 12/20/24 decreased to 448  Labs  drawn on 12/24/2024, repeat  trending down  Continue Abilify 30 mg daily for psychosis as he is resistant to single antipsychotic  Continue Lexapro 20 mg daily for depression and anxiety  Continue Robinul 1 mg BID and 2 mg QHS for sialorrhea from Clozapine  Continue Atropine 1% solution 1 drop sublingually QHS for adverse effects of Clozapine  Continue Propanolol 10 mg BID for anxiety   Continue Melatonin 9 mg QHS for sleep  Continue Senna-Docusate sodium one 50 mg tablet QHS for constipation    Pt remains on dual antipsychotic Tx due to failure on monotherapy   .  S/p ECT treatments.  - ACT team referral was made for him living back with his aunt once MA funding comes through from the [facility] and sister will try to reapply

## 2025-01-02 NOTE — PLAN OF CARE
Problem: Alteration in Thoughts and Perception  Goal: Recognize dysfunctional thoughts, communicate reality-based thoughts at the time of discharge  Description: Interventions:  - Provide medication and psycho-education to assist patient in compliance and developing insight into his/her illness   Outcome: Progressing  Goal: Complete daily ADLs, including personal hygiene independently, as able  Description: Interventions:  - Observe, teach, and assist patient with ADLS  - Monitor and promote a balance of rest/activity, with adequate nutrition and elimination   Outcome: Progressing     Problem: Ineffective Coping  Goal: Participates in unit activities  Description: Interventions:  - Provide therapeutic environment   - Provide required programming   - Redirect inappropriate behaviors   Outcome: Progressing     Problem: Anxiety  Goal: Anxiety is at manageable level  Description: Interventions:  - Assess and monitor patient's anxiety level.   - Monitor for signs and symptoms (heart palpitations, chest pain, shortness of breath, headaches, nausea, feeling jumpy, restlessness, irritable, apprehensive).   - Collaborate with interdisciplinary team and initiate plan and interventions as ordered.  - [place] patient to unit/surroundings  - Explain treatment plan  - Encourage participation in care  - Encourage verbalization of concerns/fears  - Identify coping mechanisms  - Assist in developing anxiety-reducing skills  - Administer/offer alternative therapies  - Limit or eliminate stimulants  Outcome: Progressing     Problem: Alteration in Orientation  Goal: Treatment Goal: Demonstrate a reduction of confusion and improved orientation to person, place, time and/or situation upon discharge, according to optimum baseline/ability  Outcome: Progressing  Goal: Interact with staff daily  Description: Interventions:  - Assess and re-assess patient's level of orientation  - Engage patient in 1 on 1 interactions, daily, for a minimum  of 15 minutes   - Establish rapport/trust with patient   Outcome: Progressing  Goal: Express concerns related to confused thinking related to:  Description: Interventions:  - Encourage patient to express feelings, fears, frustrations, hopes  - Assign consistent caregivers   - [place]/re-orient patient as needed  - Allow comfort items, as appropriate  - Provide visual cues, signs, etc.   Outcome: Progressing  Goal: Allow medical examinations, as recommended  Description: Interventions:  - Provide physical/neurological exams and/or referrals, per provider   Outcome: Progressing  Goal: Cooperate with recommended testing/procedures  Description: Interventions:  - Determine need for ancillary testing  - Observe for mental status changes  - Implement falls/precaution protocol   Outcome: Progressing

## 2025-01-02 NOTE — PROGRESS NOTES
01/02/25 1216   Team Meeting   Meeting Type Daily Rounds   Team Members Present   Team Members Present Physician;Nurse;;Other (Discipline and Name)   Physician Team Member Abraham   Nursing Team Member Claudia   Social Work Team Member Jacob BROOKS   Other (Discipline and Name) CHEL Mcdowell & DO Morgan & JOSÉ MIGUEL Kunz   Patient/Family Present   Patient Present No   Patient's Family Present No     Group Participation: 2/4  Visible in milieu. Pleasant mood. Family visit with sister on 1/6/25 who will assist in applying for MA benefits. Awaiting approval from [facility] services.

## 2025-01-02 NOTE — ASSESSMENT & PLAN NOTE
Reviewed on 1/02/25  on nicotine gum as needed and encouraged smoking cessation  Continue to encourage cessation upon discharge

## 2025-01-02 NOTE — ASSESSMENT & PLAN NOTE
Reviewed on 01/02/25  Follows with medical team  Continue senna-docusate sodium dose per medical team.

## 2025-01-02 NOTE — ASSESSMENT & PLAN NOTE
Reviewed on 01/02/25  Follows with medical  Continue famotidine 20 mg tablet BID per medical team   Continue glycopyrrolate 1 mg tablet BID AM and afternoon per medical team  Continue glycopyrrolate 2 mg tablet before bed per medical team  Continue pantoprazole EC 20 mg tablet every morning per medical team  This is a chronic condition, and is stable unless otherwise noted.

## 2025-01-02 NOTE — ASSESSMENT & PLAN NOTE
Reviewed on 1/02/25  Follows with medical team and given dietary counseling and need for exercise

## 2025-01-02 NOTE — PROGRESS NOTES
Progress Note - Behavioral Health   Name: Alberto Berumen 29 y.o. male I MRN: [id number]  Unit/Bed#: [facility] 112-02 I Date of Admission: 3/29/2024   Date of Service: 1/2/2025 I Hospital Day: 279     Assessment & Plan  Schizoaffective disorder, bipolar type (HCC)  Reviewed on 01/02/25  No significant changes still with same threatening voices as before not commanding and planning to have a virtual visit with his sisters  Continue medication as follows:  Continue Clozapine 250 mg QHS for psychosis - increased 12/11/24  To consider further careful titration  CBC w/diff and CK every 2 weeks  CK was increased to 960 on 12/12 and oral hydration encouraged, CK improved to 905 on 12/13/24. Repeat on 12/17/24 decreased to 674 and on 12/20/24 decreased to 448  Labs  drawn on 12/24/2024, repeat  trending down  Continue Abilify 30 mg daily for psychosis as he is resistant to single antipsychotic  Continue Lexapro 20 mg daily for depression and anxiety  Continue Robinul 1 mg BID and 2 mg QHS for sialorrhea from Clozapine  Continue Atropine 1% solution 1 drop sublingually QHS for adverse effects of Clozapine  Continue Propanolol 10 mg BID for anxiety   Continue Melatonin 9 mg QHS for sleep  Continue Senna-Docusate sodium one 50 mg tablet QHS for constipation    Pt remains on dual antipsychotic Tx due to failure on monotherapy   .  S/p ECT treatments.  - ACT team referral was made for him living back with his aunt once MA funding comes through from the [facility] and sister will try to reapply    Chronic idiopathic constipation  Reviewed on 01/02/25  Follows with medical team  Continue senna-docusate sodium dose per medical team.     GERD (gastroesophageal reflux disease)  Reviewed on 01/02/25  Follows with medical  Continue famotidine 20 mg tablet BID per medical team   Continue glycopyrrolate 1 mg tablet BID AM and afternoon per medical team  Continue glycopyrrolate 2 mg tablet before bed per medical team  Continue  pantoprazole EC 20 mg tablet every morning per medical team  This is a chronic condition, and is stable unless otherwise noted.    Tobacco abuse   Reviewed on 1/02/25  on nicotine gum as needed and encouraged smoking cessation  Continue to encourage cessation upon discharge    Elevated hemoglobin A1c  Reviewed on 1/02/25  Follows with medical team    Class 2 obesity in adult  Reviewed on 1/02/25  Follows with medical team and given dietary counseling and need for exercise      Primary hypertension  Reviewed on 1/02/25  Follows with medical team on hydrochlorothiazide and Norvasc    Psychiatry Progress Note [facility]    Progress towards goals: progressing, moderate progress     Review of systems: denied    Psychiatric Diagnosis: Schizoaffective disorder, bipolar type     Assessment  Overall Status:  progressing, no significant changes   Certification Statement: The patient will continue to require additional inpatient hospital stay due to psychotic related symptoms and limited response to medications and ECT.       Medications: as above  Side effects from treatment: denied  Medication changes: as above  Medication education: Risks side effects benefits and precautions of medications discussed with patient and they did verbalize an understanding about risks for metabolic syndrome from being on neuroleptics and tardive dyskinesia etc.  All medications reviewed and I recommend that they be continued for symptom management  Understanding of medications: Risks side effects benefits and precautions of medications discussed with patient and they did verbalize an understanding about risks for metabolic syndrome from being on neuroleptics and tardive dyskinesia etc., and patient appeared to have understanding.  Justification for dual anti-psychotics: due to not responding to single antipsychotic     Non-pharmacological treatments  Continue with individual, group, milieu and occupational therapy using  "recovery principles and psycho-education about accepting illness and the need for treatment.  Behavioral health checks every 7 minutes    Safety  Safety and communication plan established to target dynamic risk factors discussed above.    Discharge Plan   To his Aunt with [facility] for ACT when received     Interval Progress: Per treatment team, Alberto has been cooperative with treatment plan and care. He attended 2 out of 4 groups yesterday. He is compliant with his medications and there was no need for psychotropic PRNs yesterday. He takes nicorette gum PRN daily.     Today, Alberto reports his mood as \"the same- grateful for life\". He reports a goal of attending groups today and working out as it improves his hallucinations. He endorses auditory hallucinations that are saying they are going to kill him. They are not commanding. He denies the hallucinations being about his family. He reports a stable appetite and no issues with falling or staying asleep. He denies SI, HI, passive thoughts of dying, or thoughts of self harm. He denied side effects of medications.       Acceptance by patient: accepting of inpatient treatment and medications  Hopefulness in recovery: to live with his aunt in the community.  Involved in reintegration process: communicates with siblings and aunt   Trusting in relationship with psychiatrist: trusting  Sleep: good  Appetite: good  Compliance with Medications: compliant  Group attendance: 2 out of 4 groups attended   Significant events: none in the past 24 hours     Mental Status Exam  Appearance: age appropriate, casually dressed, adequate grooming,    Behavior: cooperative, calm, lying in bed   Speech: normal rate, normal volume, normal pitch, coherent, not pressured or hyperverbal  Mood:  \"grateful for life\"  Affect:    constricted, mood-congruent  Thought Process: organized, logical, coherent, goal directed  Thought Content: mild paranoia, intrusive thoughts  Perceptual " Disturbances: auditory hallucinations of voices saying they are going to kill him, not command hallucinations. Denies involvement of family in the hallucinations.  No visual hallucinations. Not observed responding to internal stimuli.  Risk Potential: Suicidal ideations none, homicidal ideations none, and potential for aggression not at this time  Sensorium: alert and oriented to person, place, time and situation  Cognition: recent and remote memory grossly intact  Consciousness: alert and awake  Attention: attention span and concentration are age appropriate  Intellect: appears to be of average intelligence  Insight: intact  Judgement: intact  Motor Activity: no abnormal movements     Vitals  Temp:  [97.5 °F (36.4 °C)-97.8 °F (36.6 °C)] 97.5 °F (36.4 °C)  HR:  [82-97] 82  Resp:  [18] 18  BP: (123-147)/(67-74) 125/71  SpO2:  [98 %] 98 %  No intake or output data in the 24 hours ending 01/02/25 0816    Lab Results: All Labs For Current Hospital Admission Reviewed        Current Facility-Administered Medications   Medication Dose Route Frequency Provider Last Rate    acetaminophen  650 mg Oral Q4H PRN Jordan C Holter, DO      acetaminophen  650 mg Oral Q6H PRN HOLLI Lion      aluminum-magnesium hydroxide-simethicone  30 mL Oral Q4H PRN Jordan C Holter, DO      amLODIPine  5 mg Oral Daily HOLLI Lion      ARIPiprazole  30 mg Oral Daily Bora Rosario MD      Artificial Tears  1 drop Both Eyes Q3H PRN Jordan C Holter, DO      atropine  1 drop Sublingual  Harjeet Torres, DO      haloperidol lactate  2.5 mg Intramuscular Q4H PRN Max 4/day HOLLI Lion      And    LORazepam  1 mg Intramuscular Q4H PRN Max 4/day HOLLI Lion      And    benztropine  0.5 mg Intramuscular Q4H PRN Max 4/day HOLLI Lion      benztropine  1 mg Intramuscular Q4H PRN Max 6/day Jordan C Holter, DO      haloperidol lactate  5 mg Intramuscular Q4H PRN Max 4/day HOLLI Lion      And    LORazepam  2 mg  Intramuscular Q4H PRN Max 4/day HOLLI Lion      And    benztropine  1 mg Intramuscular Q4H PRN Max 4/day HOLLI Lion      benztropine  1 mg Oral Q4H PRN Max 6/day HOLLI Lion      benztropine  1 mg Oral Q4H PRN Max 6/day Ion FISCHER Holter, DO      bisacodyl  10 mg Rectal Daily PRN HOLLI Lion      calcium carbonate  500 mg Oral BID PRN HOLLI Monge      cloZAPine  250 mg Oral HS Bora Rosario MD      hydrOXYzine HCL  50 mg Oral Q6H PRN Max 4/day Ion FISCHER Holter, DO      Or    diphenhydrAMINE  50 mg Intramuscular Q6H PRN Jordan C Holter, DO      hydrOXYzine HCL  50 mg Oral Q6H PRN Max 4/day HOLLI Lion      Or    diphenhydrAMINE  50 mg Intramuscular Q6H PRN HOLLI Lion      diphenhydrAMINE-zinc acetate   Topical BID PRN HOLLI Lion      docusate sodium  100 mg Oral BID Jourdan Dickens, DO      escitalopram  20 mg Oral Daily HOLLI Lion      famotidine  20 mg Oral BID PRN HOLLI Monge      fluticasone  1 spray Each Nare Daily PRN HOLLI Monge      glycopyrrolate  1 mg Oral BID (AM & Afternoon) Bora Rosario MD      glycopyrrolate  2 mg Oral HS Bora Rosario MD      haloperidol  1 mg Oral Q6H PRN HOLLI Lion      haloperidol  2.5 mg Oral Q4H PRN Max 4/day HOLLI Lion      haloperidol  5 mg Oral Q4H PRN Max 4/day HOLLI Lion      hydroCHLOROthiazide  12.5 mg Oral Daily HOLLI Galvan      hydrocortisone   Topical 4x Daily PRN HOLLI Lion      hydrOXYzine HCL  100 mg Oral Q6H PRN Max 4/day Ion FISCHER Holter, DO      Or    LORazepam  2 mg Intramuscular Q6H PRN Ion FISCHER Holter, DO      hydrOXYzine HCL  100 mg Oral Q6H PRN Max 4/day HOLLI Lion      Or    LORazepam  2 mg Intramuscular Q6H PRN HOLLI Lion      hydrOXYzine HCL  25 mg Oral Q6H PRN Max 4/day Jordan C Holter, DO      ibuprofen  600 mg Oral Q8H PRN HOLLI Lion      influenza vaccine  0.5 mL  Intramuscular Once Bora Rosario MD      loratadine  10 mg Oral Daily Brina Guillen MD      magnesium hydroxide  30 mL Oral Once Jourdan Dickens, DO      melatonin  3 mg Oral HS PRN Bora Rosario MD      melatonin  9 mg Oral HS Bora Rosario MD      methocarbamol  500 mg Oral Q6H PRN HOLLI Lion      multivitamin-minerals  1 tablet Oral Daily Eileen Gonzalezmiryamjaylen, HOLLI      nicotine polacrilex  2 mg Oral Q4H PRN Bora Rosario MD      OLANZapine  5 mg Oral Q4H PRN Max 3/day [facility] Holter, DO      Or    OLANZapine  2.5 mg Intramuscular Q4H PRN Max 3/day [facility] Holter, DO      OLANZapine  5 mg Oral Q3H PRN Max 3/day [facility] Holter, DO      Or    OLANZapine  5 mg Intramuscular Q3H PRN Max 3/day [facility] Holter, DO      OLANZapine  2.5 mg Oral Q4H PRN Max 6/day [facility] Holter, DO      ondansetron  4 mg Oral Q6H PRN HOLLI Lion      [facility]      polyethylene glycol  17 g Oral Daily PRN HOLLI Ghotra      polyethylene glycol  17 g Oral Daily Jourdan Dickens, DO      propranolol  10 mg Oral Q12H KAYLIE HOLLI Lion      senna-docusate sodium  1 tablet Oral Daily PRN [facility] Holter, DO      senna-docusate sodium  2 tablet Oral HS Jourdan Dickens, DO      traZODone  50 mg Oral HS PRN HOLLI Lion      white petrolatum-mineral oil   Topical TID PRN HOLLI Lion         Counseling / Coordination of Care: Total floor / unit time spent today 15 minutes. Greater than 50% of total time was spent with the patient and / or family counseling and / or somewhat receptive to supportive listening and teaching positive coping skills to deal with symptom mangement.     Patient's Rights, confidentiality and exceptions to confidentiality, use of automated medical record, Behavioral Health Services staff access to medical record, and consent to treatment reviewed.    This note has been dictated and hence there may be problems with punctuation, spelling and  formatting and if anyone has any concerns please address them to providers.  This note is not shared with patient due to potential for making patient's condition worse by knowing the content of the note.    Note constructed by and patient evaluated by RODRIGO Waller, and Hollie Mora DO Psychiatry Resident, PGY-IV

## 2025-01-02 NOTE — NURSING NOTE
Patient visible all evening. Currently in there dining room with his peers. Had HS snack and scheduled HS medications shortly afterwards. Medication compliant. Offered no complaints. Cooperative and pleasant upon approach. Behaviors controlled and appropriate all shift.

## 2025-01-03 PROCEDURE — 99232 SBSQ HOSP IP/OBS MODERATE 35: CPT | Performed by: PSYCHIATRY & NEUROLOGY

## 2025-01-03 RX ADMIN — NICOTINE POLACRILEX 2 MG: 2 GUM, CHEWING ORAL at 17:15

## 2025-01-03 RX ADMIN — DOCUSATE SODIUM 100 MG: 100 CAPSULE, LIQUID FILLED ORAL at 09:10

## 2025-01-03 RX ADMIN — HYDROCHLOROTHIAZIDE 12.5 MG: 12.5 TABLET ORAL at 09:10

## 2025-01-03 RX ADMIN — GLYCOPYRROLATE 2 MG: 1 TABLET ORAL at 21:07

## 2025-01-03 RX ADMIN — LORATADINE 10 MG: 10 TABLET ORAL at 09:11

## 2025-01-03 RX ADMIN — ARIPIPRAZOLE 30 MG: 15 TABLET ORAL at 09:11

## 2025-01-03 RX ADMIN — PROPRANOLOL HYDROCHLORIDE 10 MG: 10 TABLET ORAL at 09:11

## 2025-01-03 RX ADMIN — ESCITALOPRAM OXALATE 20 MG: 10 TABLET, FILM COATED ORAL at 09:11

## 2025-01-03 RX ADMIN — NICOTINE POLACRILEX 2 MG: 2 GUM, CHEWING ORAL at 09:12

## 2025-01-03 RX ADMIN — NICOTINE POLACRILEX 2 MG: 2 GUM, CHEWING ORAL at 21:16

## 2025-01-03 RX ADMIN — PROPRANOLOL HYDROCHLORIDE 10 MG: 10 TABLET ORAL at 21:07

## 2025-01-03 RX ADMIN — ATROPINE SULFATE 1 DROP: 10 SOLUTION/ DROPS OPHTHALMIC at 21:08

## 2025-01-03 RX ADMIN — CLOZAPINE 250 MG: 25 TABLET ORAL at 21:07

## 2025-01-03 RX ADMIN — GLYCOPYRROLATE 1 MG: 1 TABLET ORAL at 14:36

## 2025-01-03 RX ADMIN — DOCUSATE SODIUM 100 MG: 100 CAPSULE, LIQUID FILLED ORAL at 17:15

## 2025-01-03 RX ADMIN — GLYCOPYRROLATE 1 MG: 1 TABLET ORAL at 09:10

## 2025-01-03 RX ADMIN — SENNOSIDES AND DOCUSATE SODIUM 2 TABLET: 50; 8.6 TABLET ORAL at 21:07

## 2025-01-03 RX ADMIN — NICOTINE POLACRILEX 2 MG: 2 GUM, CHEWING ORAL at 12:50

## 2025-01-03 RX ADMIN — PANTOPRAZOLE SODIUM 20 MG: 20 TABLET, DELAYED RELEASE ORAL at 09:10

## 2025-01-03 RX ADMIN — MULTIPLE VITAMINS W/ MINERALS TAB 1 TABLET: TAB ORAL at 09:10

## 2025-01-03 RX ADMIN — MELATONIN TAB 3 MG 9 MG: 3 TAB at 21:07

## 2025-01-03 NOTE — NURSING NOTE
Patient visible this evening, calm, polite, social with selective peers. Patient behaviors controlled, offered no complaints, compliant with bedtime meds, positive group attendance this evening. Will continue to monitor.

## 2025-01-03 NOTE — ASSESSMENT & PLAN NOTE
Reviewed on 1/03/25  Follows with medical team and given dietary counseling and need for exercise

## 2025-01-03 NOTE — PROGRESS NOTES
Progress Note - Behavioral Health   Name: Alberto Berumen 29 y.o. male I MRN: [id number]  Unit/Bed#: [facility] 112-02 I Date of Admission: 3/29/2024   Date of Service: 1/3/2025 I Hospital Day: 280     Assessment & Plan  Schizoaffective disorder, bipolar type (HCC)  Reviewed on 01/03/25  No significant changes still with same threatening voices as before not commanding and planning to have a virtual visit with his sisters  Continue medication as follows:  Continue Clozapine 250 mg QHS for psychosis - increased 12/11/24  To consider further careful titration  CBC w/diff and CK every 2 weeks  CK was increased to 960 on 12/12 and oral hydration encouraged, CK improved to 905 on 12/13/24. Repeat on 12/17/24 decreased to 674 and on 12/20/24 decreased to 448  Labs  drawn on 12/24/2024, repeat  trending down  Continue Abilify 30 mg daily for psychosis as he is resistant to single antipsychotic  Continue Lexapro 20 mg daily for depression and anxiety  Continue Robinul 1 mg BID and 2 mg QHS for sialorrhea from Clozapine  Continue Atropine 1% solution 1 drop sublingually QHS for adverse effects of Clozapine  Continue Propanolol 10 mg BID for anxiety   Continue Melatonin 9 mg QHS for sleep  Continue Senna-Docusate sodium one 50 mg tablet QHS for constipation    Pt remains on dual antipsychotic Tx due to failure on monotherapy   .  S/p ECT treatments.  - ACT team referral was made for him living back with his aunt once MA funding comes through from the [facility] and sister will try to reapply    Chronic idiopathic constipation  Reviewed on 01/03/25  Follows with medical team  Continue senna-docusate sodium dose per medical team.     GERD (gastroesophageal reflux disease)  Reviewed on 01/03/25  Follows with medical  Continue famotidine 20 mg tablet BID per medical team   Continue glycopyrrolate 1 mg tablet BID AM and afternoon per medical team  Continue glycopyrrolate 2 mg tablet before bed per medical team  Continue  pantoprazole EC 20 mg tablet every morning per medical team  This is a chronic condition, and is stable unless otherwise noted.    Tobacco abuse   Reviewed on 1/03/25  on nicotine gum as needed and encouraged smoking cessation  Continue to encourage cessation upon discharge    Elevated hemoglobin A1c  Reviewed on 1/03/25  Follows with medical team    Class 2 obesity in adult  Reviewed on 1/03/25  Follows with medical team and given dietary counseling and need for exercise    Primary hypertension  Reviewed on 1/03/25  Follows with medical team on hydrochlorothiazide and Norvasc    Psychiatry Progress Note [facility]    Progress towards goals: moderate progress    Review of systems: denied    Psychiatric Diagnosis: Schizoaffective disorder, bipolar type     Assessment  Overall Status:  progressing, no significant changes   Certification Statement: The patient will continue to require additional inpatient hospital stay due to psychotic related symptoms and limited response to medications and ECT.        Medications: as above  Side effects from treatment: denied   Medication changes: as above  Medication education: Risks side effects benefits and precautions of medications discussed with patient and they did verbalize an understanding about risks for metabolic syndrome from being on neuroleptics and tardive dyskinesia etc.  All medications reviewed and I recommend that they be continued for symptom management  Understanding of medications: Risks side effects benefits and precautions of medications discussed with patient and they did verbalize an understanding about risks for metabolic syndrome from being on neuroleptics and tardive dyskinesia etc., and patient appeared to have understanding.  Justification for dual anti-psychotics: due to not responding to single antipsychotic.    Non-pharmacological treatments  Continue with individual, group, milieu and occupational therapy using recovery  "principles and psycho-education about accepting illness and the need for treatment.  Behavioral health checks every 7 minutes    Safety  Safety and communication plan established to target dynamic risk factors discussed above.    Discharge Plan   To his Aunt with [facility] for ACT when received     Interval Progress: Per treatment team Alberto has been cooperative with his care and treatment plan. There have been no events in the last 24 hours. He attended 5/10 groups yesterday. He has been complaint with his medications and there was no need for psychotropic PRNs yesterday. Per treatment team his appetite is at baseline. He typically skips breakfast and then eats regularly the remainder of the day. Patient required PRN nicorette gum.     Today, Alberto reports his mood as \"stable\". He stated that he has a goal of continuing to work out today. He said that he had worked out yesterday as planned and that it made him feel better overall. He endorses continual auditory hallucinations that are saying they are going to kill him. They are not commanding and he denies the hallucinations involving his family or anyone else. He reports a stable appetite and good sleep the night prior. He denied SI, HI, or thoughts of self harm. He denied any medication side effects.    Acceptance by patient: accepts inpatient treatment and medications  Hopefulness in recovery: to live with his aunt in the community  Involved in reintegration process: communicates with siblings and aunt  Trusting in relationship with psychiatrist: trusting  Sleep: great  Appetite: stable  Compliance with Medications: complaint  Group attendance: 5/10 groups  Significant events: none in the last 24 hours     Mental Status Exam  Appearance:  age appropriate, casually dressed, adequate grooming  Behavior: cooperative, calm, lying in bed, initially sleeping but easily awoken and engaging in conversation.  Speech: normal rate, normal volume, normal pitch, " "coherent, not pressured or hyperverbal  Mood:  \"stable\"  Affect: constricted, mood-congruent  Thought Process: organized, logical, coherent, goal directed  Thought Content: mild paranoia, intrusive thoughts  Perceptual Disturbances: auditory hallucinations of voices that say they are going to kill him. Denies command hallucinations. Denies involvement of family and says hallucinations only involve him. Up until recently patient was reporting involvement of family in his auditory hallucinations. No visual hallucinations. Not observed responding to internal stimuli.  Risk Potential: Suicidal ideations none, homicidal ideations none, and potential for aggression not present at this time.   Sensorium: alert and oriented to person, place, time and situation  Cognition: recent and remote memory grossly intact  Consciousness: alert and awake  Attention: attention span and concentration are age appropriate  Intellect: appears to be of average intelligence  Insight: intact  Judgement: intact  Motor Activity: no abnormal movements     Vitals  Temp:  [97.6 °F (36.4 °C)-97.8 °F (36.6 °C)] 97.6 °F (36.4 °C)  HR:  [73-90] 73  Resp:  [18] 18  BP: (113-124)/(64-84) 113/84  SpO2:  [95 %-97 %] 95 %  No intake or output data in the 24 hours ending 01/03/25 0907    Lab Results: All Labs For Current Hospital Admission Reviewed      Current Facility-Administered Medications   Medication Dose Route Frequency Provider Last Rate    acetaminophen  650 mg Oral Q4H PRN Jordan C Holter, DO      acetaminophen  650 mg Oral Q6H PRN HOLLI Lion      aluminum-magnesium hydroxide-simethicone  30 mL Oral Q4H PRN Jordan C Holter, DO      amLODIPine  5 mg Oral Daily HOLLI Lion      ARIPiprazole  30 mg Oral Daily Bora Rosario MD      Artificial Tears  1 drop Both Eyes Q3H PRN Jordan C Holter, DO      atropine  1 drop Sublingual  Harjeet Torres DO      haloperidol lactate  2.5 mg Intramuscular Q4H PRN Max 4/day HOLLI Lion      " And    LORazepam  1 mg Intramuscular Q4H PRN Max 4/day STEVE LionNP      And    benztropine  0.5 mg Intramuscular Q4H PRN Max 4/day HOLLI Lion      benztropine  1 mg Intramuscular Q4H PRN Max 6/day Ion FISCHER Holter, DO      haloperidol lactate  5 mg Intramuscular Q4H PRN Max 4/day HOLLI Lion      And    LORazepam  2 mg Intramuscular Q4H PRN Max 4/day HOLLI Lion      And    benztropine  1 mg Intramuscular Q4H PRN Max 4/day HOLLI Lion      benztropine  1 mg Oral Q4H PRN Max 6/day HOLLI Lion      benztropine  1 mg Oral Q4H PRN Max 6/day Jordan C Holter, DO      bisacodyl  10 mg Rectal Daily PRN HOLLI Lion      calcium carbonate  500 mg Oral BID PRN HOLLI Monge      cloZAPine  250 mg Oral HS Bora Rosario MD      hydrOXYzine HCL  50 mg Oral Q6H PRN Max 4/day Jordan C Holter, DO      Or    diphenhydrAMINE  50 mg Intramuscular Q6H PRN Jordan C Holter, DO      hydrOXYzine HCL  50 mg Oral Q6H PRN Max 4/day HOLLI Lion      Or    diphenhydrAMINE  50 mg Intramuscular Q6H PRN HOLLI Lion      diphenhydrAMINE-zinc acetate   Topical BID PRN HOLLI Lion      docusate sodium  100 mg Oral BID Jourdan Dickens, DO      escitalopram  20 mg Oral Daily HOLLI Lion      famotidine  20 mg Oral BID PRN HOLLI Monge      fluticasone  1 spray Each Nare Daily PRN HOLLI Monge      glycopyrrolate  1 mg Oral BID (AM & Afternoon) Bora Rosario MD      glycopyrrolate  2 mg Oral HS Bora Rosario MD      haloperidol  1 mg Oral Q6H PRN HOLLI Lion      haloperidol  2.5 mg Oral Q4H PRN Max 4/day HOLLI Lion      haloperidol  5 mg Oral Q4H PRN Max 4/day HOLLI Lion      hydroCHLOROthiazide  12.5 mg Oral Daily Pia Mantilla, HOLLI      hydrocortisone   Topical 4x Daily PRN HOLLI Lion      hydrOXYzine HCL  100 mg Oral Q6H PRN Max 4/day Jordan C Holter, DO      Or    LORazepam  2 mg  Intramuscular Q6H PRN [facility] Holter, DO      hydrOXYzine HCL  100 mg Oral Q6H PRN Max 4/day HOLLI Lion      Or    LORazepam  2 mg Intramuscular Q6H PRN HOLLI Lion      hydrOXYzine HCL  25 mg Oral Q6H PRN Max 4/day [facility] Holter, DO      ibuprofen  600 mg Oral Q8H PRN HOLLI Lion      influenza vaccine  0.5 mL Intramuscular Once Bora Rosario MD      loratadine  10 mg Oral Daily Brina Guillen MD      magnesium hydroxide  30 mL Oral Once Jourdan Dickens, DO      melatonin  3 mg Oral HS PRN Bora Rosario MD      melatonin  9 mg Oral HS Bora Rosario MD      methocarbamol  500 mg Oral Q6H PRN HOLLI Lion      multivitamin-minerals  1 tablet Oral Daily Eileen CherryHOLLI Jimenez      nicotine polacrilex  2 mg Oral Q4H PRN Bora Rosario MD      OLANZapine  5 mg Oral Q4H PRN Max 3/day [facility] Holter, DO      Or    OLANZapine  2.5 mg Intramuscular Q4H PRN Max 3/day [facility] Holter, DO      OLANZapine  5 mg Oral Q3H PRN Max 3/day [facility] Holter, DO      Or    OLANZapine  5 mg Intramuscular Q3H PRN Max 3/day [facility] Holter, DO      OLANZapine  2.5 mg Oral Q4H PRN Max 6/day [facility] Holter, DO      ondansetron  4 mg Oral Q6H PRN HOLLI Lion      [facility]antoprazole  [facility]      polyethylene glycol  17 g Oral Daily PRN HOLLI Ghotra      polyethylene glycol  17 g Oral Daily Jourdan Dickens, DO      propranolol  10 mg Oral Q12H KAYLIE HOLLI Lion      senna-docusate sodium  1 tablet Oral Daily PRN [facility] Cresencioter, DO      senna-docusate sodium  2 tablet Oral HS Jourdan Dickens, DO      traZODone  50 mg Oral HS PRN HOLLI Lion      white petrolatum-mineral oil   Topical TID PRN HOLLI Lion         Counseling / Coordination of Care: Total floor / unit time spent today 15 minutes. Greater than 50% of total time was spent with the patient and / or family counseling and / or somewhat receptive to supportive listening and teaching positive  coping skills to deal with symptom mangement.     Patient's Rights, confidentiality and exceptions to confidentiality, use of automated medical record, Behavioral Health Services staff access to medical record, and consent to treatment reviewed.    This note has been dictated and hence there may be problems with punctuation, spelling and formatting and if anyone has any concerns please address them to Dr. Rosario    Note written by Leidy WALLACE and supervised by Dr. Bora Rosario.     This note is not shared with patient due to potential for making patient's condition worse by knowing the content of the note.

## 2025-01-03 NOTE — ASSESSMENT & PLAN NOTE
Reviewed on 01/03/25  No significant changes still with same threatening voices as before not commanding and planning to have a virtual visit with his sisters  Continue medication as follows:  Continue Clozapine 250 mg QHS for psychosis - increased 12/11/24  To consider further careful titration  CBC w/diff and CK every 2 weeks  CK was increased to 960 on 12/12 and oral hydration encouraged, CK improved to 905 on 12/13/24. Repeat on 12/17/24 decreased to 674 and on 12/20/24 decreased to 448  Labs  drawn on 12/24/2024, repeat  trending down  Continue Abilify 30 mg daily for psychosis as he is resistant to single antipsychotic  Continue Lexapro 20 mg daily for depression and anxiety  Continue Robinul 1 mg BID and 2 mg QHS for sialorrhea from Clozapine  Continue Atropine 1% solution 1 drop sublingually QHS for adverse effects of Clozapine  Continue Propanolol 10 mg BID for anxiety   Continue Melatonin 9 mg QHS for sleep  Continue Senna-Docusate sodium one 50 mg tablet QHS for constipation    Pt remains on dual antipsychotic Tx due to failure on monotherapy   .  S/p ECT treatments.  - ACT team referral was made for him living back with his aunt once MA funding comes through from the [facility] and sister will try to reapply

## 2025-01-03 NOTE — NURSING NOTE
Pt is accepting of medications without incidence and meal compliant consuming 0% of breakfast and 100% of lunch. Pt is polite, pleasant and social with select peers. Visible in the milieu and attending groups. Remains voicing the same AH and anxiety and depression, but denies all other s/s. No new concerns otherwise.

## 2025-01-03 NOTE — ASSESSMENT & PLAN NOTE
Reviewed on 1/03/25  on nicotine gum as needed and encouraged smoking cessation  Continue to encourage cessation upon discharge

## 2025-01-03 NOTE — NURSING NOTE
Received pt in bed at change of shift with eyes closed; chest movement noted.  Continues the same thus this far as per q 15 min room checks.   Will continue to monitor behavior, sleeping pattern and any medical issues that may arise.    0613:  Sleeping 7+ hrs thus this far

## 2025-01-03 NOTE — PROGRESS NOTES
01/03/25 1052   Team Meeting   Meeting Type Daily Rounds   Team Members Present   Team Members Present Physician;Nurse;;Other (Discipline and Name)   Physician Team Member Abraham   Nursing Team Member Claudia   Social Work Team Member Jacob BROOKS   Other (Discipline and Name) CHEL Mcdowell & JOSÉ MIGUEL Kunz   Patient/Family Present   Patient Present No   Patient's Family Present No     Group Participation: 5/10  No behaviors. Medication compliant with sufficient sleep and appetite. Sister will come on 1/6/25 to assist patient with re-applying for medical assistance benefits. Awaiting [facility] approval to fund ACT services.

## 2025-01-03 NOTE — ASSESSMENT & PLAN NOTE
Reviewed on 01/03/25  Follows with medical team  Continue senna-docusate sodium dose per medical team.

## 2025-01-03 NOTE — PLAN OF CARE
Problem: Alteration in Thoughts and Perception  Goal: Treatment Goal: Gain control of psychotic behaviors/thinking, reduce/eliminate presenting symptoms and demonstrate improved reality functioning upon discharge  Outcome: Progressing  Goal: Refrain from acting on delusional thinking/internal stimuli  Description: Interventions:  - Monitor patient closely, per order   - Utilize least restrictive measures   - Set reasonable limits, give positive feedback for acceptable   - Administer medications as ordered and monitor of potential side effects  Outcome: Progressing  Goal: Agree to be compliant with medication regime, as prescribed and report medication side effects  Description: Interventions:  - Offer appropriate PRN medication and supervise ingestion; conduct AIMS, as needed   Outcome: Progressing  Goal: Attend and participate in unit activities, including therapeutic, recreational, and educational groups  Description: Interventions:  -Encourage Visitation and family involvement in care  Outcome: Progressing  Goal: Complete daily ADLs, including personal hygiene independently, as able  Description: Interventions:  - Observe, teach, and assist patient with ADLS  - Monitor and promote a balance of rest/activity, with adequate nutrition and elimination   Outcome: Progressing     Problem: Ineffective Coping  Goal: Participates in unit activities  Description: Interventions:  - Provide therapeutic environment   - Provide required programming   - Redirect inappropriate behaviors   Outcome: Progressing  Goal: Patient/Family participate in treatment and DC plans  Description: Interventions:  - Provide therapeutic environment  Outcome: Progressing     Problem: Depression  Goal: Treatment Goal: Demonstrate behavioral control of depressive symptoms, verbalize feelings of improved mood/affect, and adopt new coping skills prior to discharge  Outcome: Progressing  Goal: Verbalize thoughts and feelings  Description:  Interventions:  - Assess and re-assess patient's level of risk   - Engage patient in 1:1 interactions, daily, for a minimum of 15 minutes   - Encourage patient to express feelings, fears, frustrations, hopes   Outcome: Progressing  Goal: Refrain from harming self  Description: Interventions:  - Monitor patient closely, per order   - Supervise medication ingestion, monitor effects and side effects   Outcome: Progressing  Goal: Refrain from isolation  Description: Interventions:  - Develop a trusting relationship   - Encourage socialization   Outcome: Progressing  Goal: Refrain from self-neglect  Outcome: Progressing     Problem: Alteration in Orientation  Goal: Interact with staff daily  Description: Interventions:  - Assess and re-assess patient's level of orientation  - Engage patient in 1 on 1 interactions, daily, for a minimum of 15 minutes   - Establish rapport/trust with patient   Outcome: Progressing  Goal: Allow medical examinations, as recommended  Description: Interventions:  - Provide physical/neurological exams and/or referrals, per provider   Outcome: Progressing  Goal: Complete daily ADLs, including personal hygiene independently, as able  Description: Interventions:  - Observe, teach, and assist patient with ADLS  Outcome: Progressing     Problem: DISCHARGE PLANNING - CARE MANAGEMENT  Goal: Discharge to post-acute care or home with appropriate resources  Description: INTERVENTIONS:  - Conduct assessment to determine patient/family and health care team treatment goals, and need for post-acute services based on payer coverage, community resources, and patient preferences, and barriers to discharge  - Address psychosocial, clinical, and financial barriers to discharge as identified in assessment in conjunction with the patient/family and health care team  - Arrange appropriate level of post-acute services according to patient's   needs and preference and payer coverage in collaboration with the physician  and health care team  - Communicate with and update the patient/family, physician, and health care team regarding progress on the discharge plan  - Arrange appropriate transportation to post-acute venues  Outcome: Progressing

## 2025-01-03 NOTE — ASSESSMENT & PLAN NOTE
Reviewed on 01/03/25  Follows with medical  Continue famotidine 20 mg tablet BID per medical team   Continue glycopyrrolate 1 mg tablet BID AM and afternoon per medical team  Continue glycopyrrolate 2 mg tablet before bed per medical team  Continue pantoprazole EC 20 mg tablet every morning per medical team  This is a chronic condition, and is stable unless otherwise noted.

## 2025-01-03 NOTE — ASSESSMENT & PLAN NOTE
Reviewed on 1/03/25  Follows with medical team on hydrochlorothiazide and Norvasc    Psychiatry Progress Note [facility]    Progress towards goals: moderate progress    Review of systems: denied    Psychiatric Diagnosis: Schizoaffective disorder, bipolar type     Assessment  Overall Status:  progressing, no significant changes   Certification Statement: The patient will continue to require additional inpatient hospital stay due to psychotic related symptoms and limited response to medications and ECT.        Medications: as above  Side effects from treatment: denied   Medication changes: as above  Medication education: Risks side effects benefits and precautions of medications discussed with patient and they did verbalize an understanding about risks for metabolic syndrome from being on neuroleptics and tardive dyskinesia etc.  All medications reviewed and I recommend that they be continued for symptom management  Understanding of medications: Risks side effects benefits and precautions of medications discussed with patient and they did verbalize an understanding about risks for metabolic syndrome from being on neuroleptics and tardive dyskinesia etc., and patient appeared to have understanding.  Justification for dual anti-psychotics: due to not responding to single antipsychotic.    Non-pharmacological treatments  Continue with individual, group, milieu and occupational therapy using recovery principles and psycho-education about accepting illness and the need for treatment.  Behavioral health checks every 7 minutes    Safety  Safety and communication plan established to target dynamic risk factors discussed above.    Discharge Plan   To his Aunt with [facility] for ACT when received     Interval Progress: Per treatment team Alberto has been cooperative with his care and treatment plan. There have been no events in the last 24 hours. He attended 5/10 groups yesterday. He has been  "complaint with his medications and there was no need for psychotropic PRNs yesterday. Per treatment team his appetite is at baseline. He typically skips breakfast and then eats regularly the remainder of the day. Patient required PRN nicorette gum.     Today, Alberto reports his mood as \"stable\". He stated that he has a goal of continuing to work out today. He said that he had worked out yesterday as planned and that it made him feel better overall. He endorses continual auditory hallucinations that are saying they are going to kill him. They are not commanding and he denies the hallucinations involving his family or anyone else. He reports a stable appetite and good sleep the night prior. He denied SI, HI, or thoughts of self harm. He denied any medication side effects.    Acceptance by patient: accepts inpatient treatment and medications  Hopefulness in recovery: to live with his aunt in the community  Involved in reintegration process: communicates with siblings and aunt  Trusting in relationship with psychiatrist: trusting  Sleep: great  Appetite: stable  Compliance with Medications: complaint  Group attendance: 5/10 groups  Significant events: none in the last 24 hours     Mental Status Exam  Appearance: age appropriate, casually dressed, adequate grooming  Behavior: cooperative, calm, lying in bed, initially sleeping but easily awoken and engaging in conversation.  Speech: normal rate, normal volume, normal pitch, coherent, not pressured or hyperverbal  Mood: \"stable\"  Affect: constricted, mood-congruent  Thought Process: organized, logical, coherent, goal directed  Thought Content: mild paranoia, intrusive thoughts  Perceptual Disturbances: auditory hallucinations of voices that say they are going to kill him. Denies command hallucinations. Denies involvement of family and says hallucinations only involve him. Up until recently patient was reporting involvement of family in his auditory hallucinations. No " visual hallucinations. Not observed responding to internal stimuli.  Risk Potential: Suicidal ideations none, homicidal ideations none, and potential for aggression not present at this time.   Sensorium: alert and oriented to person, place, time and situation  Cognition: recent and remote memory grossly intact  Consciousness: alert and awake  Attention: attention span and concentration are age appropriate  Intellect: appears to be of average intelligence  Insight: intact  Judgement: intact  Motor Activity: no abnormal movements     Vitals  Temp:  [97.6 °F (36.4 °C)-97.8 °F (36.6 °C)] 97.6 °F (36.4 °C)  HR:  [73-90] 73  Resp:  [18] 18  BP: (113-124)/(64-84) 113/84  SpO2:  [95 %-97 %] 95 %  No intake or output data in the 24 hours ending 01/03/25 0907    Lab Results: All Labs For Current Hospital Admission Reviewed      Current Facility-Administered Medications   Medication Dose Route Frequency Provider Last Rate    acetaminophen  650 mg Oral Q4H PRN Jordan C Holter, DO      acetaminophen  650 mg Oral Q6H PRN HOLLI Lion      aluminum-magnesium hydroxide-simethicone  30 mL Oral Q4H PRN Jordan C Holter, DO      amLODIPine  5 mg Oral Daily HOLLI Lion      ARIPiprazole  30 mg Oral Daily Bora Rosario MD      Artificial Tears  1 drop Both Eyes Q3H PRN Jordan C Holter, DO      atropine  1 drop Sublingual  Harjeet Torres,       haloperidol lactate  2.5 mg Intramuscular Q4H PRN Max 4/day HOLLI Lion      And    LORazepam  1 mg Intramuscular Q4H PRN Max 4/day HOLLI Lion      And    benztropine  0.5 mg Intramuscular Q4H PRN Max 4/day HOLLI Lion      benztropine  1 mg Intramuscular Q4H PRN Max 6/day Jordan C Holter,       haloperidol lactate  5 mg Intramuscular Q4H PRN Max 4/day HOLLI Lion      And    LORazepam  2 mg Intramuscular Q4H PRN Max 4/day HOLLI Lion      And    benztropine  1 mg Intramuscular Q4H PRN Max 4/day HOLLI Lion      benztropine  1 mg Oral  Q4H PRN Max 6/day HOLLI Lion      benztropine  1 mg Oral Q4H PRN Max 6/day Ion FISCHER Holter, DO      bisacodyl  10 mg Rectal Daily PRN HOLLI Lion      calcium carbonate  500 mg Oral BID PRN HOLLI Monge      cloZAPine  250 mg Oral HS Bora Rosario MD      hydrOXYzine HCL  50 mg Oral Q6H PRN Max 4/day Ion C Holter, DO      Or    diphenhydrAMINE  50 mg Intramuscular Q6H PRN Ion FISCHER Holter, DO      hydrOXYzine HCL  50 mg Oral Q6H PRN Max 4/day HOLLI Lion      Or    diphenhydrAMINE  50 mg Intramuscular Q6H PRN HOLLI Lion      diphenhydrAMINE-zinc acetate   Topical BID PRN HOLLI Lion      docusate sodium  100 mg Oral BID Jourdan Dickens, DO      escitalopram  20 mg Oral Daily HOLLI Lion      famotidine  20 mg Oral BID PRN HOLLI Monge      fluticasone  1 spray Each Nare Daily PRN HOLLI Monge      glycopyrrolate  1 mg Oral BID (AM & Afternoon) Bora Rosario MD      glycopyrrolate  2 mg Oral HS Bora Rosario MD      haloperidol  1 mg Oral Q6H PRN HOLLI Lion      haloperidol  2.5 mg Oral Q4H PRN Max 4/day HOLLI Lion      haloperidol  5 mg Oral Q4H PRN Max 4/day HOLLI Lion      hydroCHLOROthiazide  12.5 mg Oral Daily Pia Mantilla, HOLLI      hydrocortisone   Topical 4x Daily PRN HOLLI Lion      hydrOXYzine HCL  100 mg Oral Q6H PRN Max 4/day Ion FISCHER Holter, DO      Or    LORazepam  2 mg Intramuscular Q6H PRN Ion FISCHER Holter, DO      hydrOXYzine HCL  100 mg Oral Q6H PRN Max 4/day HOLLI Lion      Or    LORazepam  2 mg Intramuscular Q6H PRN HOLLI Lion      hydrOXYzine HCL  25 mg Oral Q6H PRN Max 4/day Ion FISCHER Holter, DO      ibuprofen  600 mg Oral Q8H PRN HOLLI Lion      influenza vaccine  0.5 mL Intramuscular Once Bora Rosario MD      loratadine  10 mg Oral Daily Brina Guillen MD      magnesium hydroxide  30 mL Oral Once Jourdan Dickens DO      melatonin  3 mg  Oral HS PRN Bora Rosario MD      melatonin  9 mg Oral HS Bora Rosario MD      methocarbamol  500 mg Oral Q6H PRN HOLLI Lion      multivitamin-minerals  1 tablet Oral Daily Eileen GonzalezmriyamjaylenHOLLI      nicotine polacrilex  2 mg Oral Q4H PRN Bora Rosario MD      OLANZapine  5 mg Oral Q4H PRN Max 3/day [facility] Holter, DO      Or    OLANZapine  2.5 mg Intramuscular Q4H PRN Max 3/day [facility] Holter, DO      OLANZapine  5 mg Oral Q3H PRN Max 3/day [facility] Holter, DO      Or    OLANZapine  5 mg Intramuscular Q3H PRN Max 3/day [facility] Holter, DO      OLANZapine  2.5 mg Oral Q4H PRN Max 6/day [facility] Holter, DO      ondansetron  4 mg Oral Q6H PRN HOLLI Lion      pantoprazole  20 [facility], DO      polyethylene glycol  17 g Oral Daily PRN Sofi Yoo, HOLLI      polyethylene glycol  17 g Oral Daily Jourdan Dickens, DO      propranolol  10 mg Oral Q12H KAYLIE HOLLI Lion      senna-docusate sodium  1 tablet Oral Daily PRN [facility] Holter, DO      senna-docusate sodium  2 tablet Oral HS Jourdan Dickens DO      traZODone  50 mg Oral HS PRN HOLLI Lion      white petrolatum-mineral oil   Topical TID PRN HOLLI Lion         Counseling / Coordination of Care: Total floor / unit time spent today 15 minutes. Greater than 50% of total time was spent with the patient and / or family counseling and / or somewhat receptive to supportive listening and teaching positive coping skills to deal with symptom mangement.     Patient's Rights, confidentiality and exceptions to confidentiality, use of automated medical record, Behavioral Health Services staff access to medical record, and consent to treatment reviewed.    This note has been dictated and hence there may be problems with punctuation, spelling and formatting and if anyone has any concerns please address them to Dr. Rosario    Note written by Leidy WALLACE and supervised by Dr. Bora Rosario.     This note is  not shared with patient due to potential for making patient's condition worse by knowing the content of the note.

## 2025-01-03 NOTE — CASE MANAGEMENT
Met with patient who was present in [facility]. Patient reports that his sister will come meet with patient to complete MA benefit paperwork the following week on Monday 1/13 due to weather reporting plans for snow this upcoming Monday.

## 2025-01-04 PROCEDURE — 99232 SBSQ HOSP IP/OBS MODERATE 35: CPT

## 2025-01-04 RX ADMIN — NICOTINE POLACRILEX 2 MG: 2 GUM, CHEWING ORAL at 21:24

## 2025-01-04 RX ADMIN — PANTOPRAZOLE SODIUM 20 MG: 20 TABLET, DELAYED RELEASE ORAL at 08:42

## 2025-01-04 RX ADMIN — DOCUSATE SODIUM 100 MG: 100 CAPSULE, LIQUID FILLED ORAL at 08:42

## 2025-01-04 RX ADMIN — AMLODIPINE BESYLATE 5 MG: 5 TABLET ORAL at 08:42

## 2025-01-04 RX ADMIN — HYDROCHLOROTHIAZIDE 12.5 MG: 12.5 TABLET ORAL at 08:42

## 2025-01-04 RX ADMIN — ATROPINE SULFATE 1 DROP: 10 SOLUTION/ DROPS OPHTHALMIC at 21:51

## 2025-01-04 RX ADMIN — LORATADINE 10 MG: 10 TABLET ORAL at 08:41

## 2025-01-04 RX ADMIN — MELATONIN TAB 3 MG 9 MG: 3 TAB at 21:24

## 2025-01-04 RX ADMIN — DOCUSATE SODIUM 100 MG: 100 CAPSULE, LIQUID FILLED ORAL at 17:09

## 2025-01-04 RX ADMIN — NICOTINE POLACRILEX 2 MG: 2 GUM, CHEWING ORAL at 17:09

## 2025-01-04 RX ADMIN — ESCITALOPRAM OXALATE 20 MG: 10 TABLET, FILM COATED ORAL at 08:42

## 2025-01-04 RX ADMIN — NICOTINE POLACRILEX 2 MG: 2 GUM, CHEWING ORAL at 12:45

## 2025-01-04 RX ADMIN — GLYCOPYRROLATE 2 MG: 1 TABLET ORAL at 21:24

## 2025-01-04 RX ADMIN — PROPRANOLOL HYDROCHLORIDE 10 MG: 10 TABLET ORAL at 08:42

## 2025-01-04 RX ADMIN — CLOZAPINE 250 MG: 25 TABLET ORAL at 21:24

## 2025-01-04 RX ADMIN — GLYCOPYRROLATE 1 MG: 1 TABLET ORAL at 14:55

## 2025-01-04 RX ADMIN — ARIPIPRAZOLE 30 MG: 15 TABLET ORAL at 08:42

## 2025-01-04 RX ADMIN — PROPRANOLOL HYDROCHLORIDE 10 MG: 10 TABLET ORAL at 21:24

## 2025-01-04 RX ADMIN — GLYCOPYRROLATE 1 MG: 1 TABLET ORAL at 08:42

## 2025-01-04 RX ADMIN — SENNOSIDES AND DOCUSATE SODIUM 2 TABLET: 50; 8.6 TABLET ORAL at 21:24

## 2025-01-04 RX ADMIN — MULTIPLE VITAMINS W/ MINERALS TAB 1 TABLET: TAB ORAL at 08:42

## 2025-01-04 NOTE — NURSING NOTE
Alberto has been visible, social, calm and polite.  Pt was utilizing the work-out room.  He was using the punching bag and was doing exercises.  Med and meal compliant.  No behavioral issues.

## 2025-01-04 NOTE — ASSESSMENT & PLAN NOTE
Reviewed on 1/03/25  Follows with medical team on hydrochlorothiazide and Norvasc    Psychiatry Progress Note [facility]    Progress towards goals: moderate progress    Review of systems: denied    Psychiatric Diagnosis: Schizoaffective disorder, bipolar type     Assessment  Overall Status:  progressing, no significant changes   Certification Statement: The patient will continue to require additional inpatient hospital stay due to psychotic related symptoms and limited response to medications and ECT.        Medications: as above  Side effects from treatment: denied   Medication changes: as above  Medication education: Risks side effects benefits and precautions of medications discussed with patient and they did verbalize an understanding about risks for metabolic syndrome from being on neuroleptics and tardive dyskinesia etc.  All medications reviewed and I recommend that they be continued for symptom management  Understanding of medications: Risks side effects benefits and precautions of medications discussed with patient and they did verbalize an understanding about risks for metabolic syndrome from being on neuroleptics and tardive dyskinesia etc., and patient appeared to have understanding.  Justification for dual anti-psychotics: due to not responding to single antipsychotic.    Non-pharmacological treatments  Continue with individual, group, milieu and occupational therapy using recovery principles and psycho-education about accepting illness and the need for treatment.  Behavioral health checks every 7 minutes    Safety  Safety and communication plan established to target dynamic risk factors discussed above.    Discharge Plan   To his Aunt with [facility] for ACT when received     Interval Progress: Per treatment team Alberto has been cooperative with his care and treatment plan. There have been no events in the last 24 hours. He attended 5/10 groups yesterday. He has been  "complaint with his medications and there was no need for psychotropic PRNs yesterday. Per treatment team his appetite is at baseline. He typically skips breakfast and then eats regularly the remainder of the day. Patient required PRN nicorette gum.     Today, Alberto reports his mood as \"stable\". He stated that he has a goal of continuing to work out today. He said that he had worked out yesterday as planned and that it made him feel better overall. He endorses continual auditory hallucinations that are saying they are going to kill him. They are not commanding and he denies the hallucinations involving his family or anyone else. He reports a stable appetite and good sleep the night prior. He denied SI, HI, or thoughts of self harm. He denied any medication side effects.    Acceptance by patient: accepts inpatient treatment and medications  Hopefulness in recovery: to live with his aunt in the community  Involved in reintegration process: communicates with siblings and aunt  Trusting in relationship with psychiatrist: trusting  Sleep: great  Appetite: stable  Compliance with Medications: complaint  Group attendance: 5/10 groups  Significant events: none in the last 24 hours     Mental Status Exam  Appearance: age appropriate, casually dressed, adequate grooming  Behavior: cooperative, calm, lying in bed, initially sleeping but easily awoken and engaging in conversation.  Speech: normal rate, normal volume, normal pitch, coherent, not pressured or hyperverbal  Mood: \"stable\"  Affect: constricted, mood-congruent  Thought Process: organized, logical, coherent, goal directed  Thought Content: mild paranoia, intrusive thoughts  Perceptual Disturbances: auditory hallucinations of voices that say they are going to kill him. Denies command hallucinations. Denies involvement of family and says hallucinations only involve him. Up until recently patient was reporting involvement of family in his auditory hallucinations. No " visual hallucinations. Not observed responding to internal stimuli.  Risk Potential: Suicidal ideations none, homicidal ideations none, and potential for aggression not present at this time.   Sensorium: alert and oriented to person, place, time and situation  Cognition: recent and remote memory grossly intact  Consciousness: alert and awake  Attention: attention span and concentration are age appropriate  Intellect: appears to be of average intelligence  Insight: intact  Judgement: intact  Motor Activity: no abnormal movements     Vitals  Temp:  [97.7 °F (36.5 °C)-97.9 °F (36.6 °C)] 97.9 °F (36.6 °C)  HR:  [79-92] 79  Resp:  [18] 18  BP: (123-134)/(72-77) 123/77  SpO2:  [96 %-99 %] 99 %  No intake or output data in the 24 hours ending 01/04/25 1221    Lab Results: All Labs For Current Hospital Admission Reviewed      Current Facility-Administered Medications   Medication Dose Route Frequency Provider Last Rate    acetaminophen  650 mg Oral Q4H PRN Jordan C Holter, DO      acetaminophen  650 mg Oral Q6H PRN HOLLI Lion      aluminum-magnesium hydroxide-simethicone  30 mL Oral Q4H PRN Jordan C Holter, DO      amLODIPine  5 mg Oral Daily HOLLI Lion      ARIPiprazole  30 mg Oral Daily Bora Rosario MD      Artificial Tears  1 drop Both Eyes Q3H PRN Jordan C Holter, DO      atropine  1 drop Sublingual  Harjeet Torres,       haloperidol lactate  2.5 mg Intramuscular Q4H PRN Max 4/day HOLLI Lion      And    LORazepam  1 mg Intramuscular Q4H PRN Max 4/day HOLLI Lion      And    benztropine  0.5 mg Intramuscular Q4H PRN Max 4/day HOLLI Lion      benztropine  1 mg Intramuscular Q4H PRN Max 6/day Jordan C Holter,       haloperidol lactate  5 mg Intramuscular Q4H PRN Max 4/day HOLLI Lion      And    LORazepam  2 mg Intramuscular Q4H PRN Max 4/day HOLLI Lion      And    benztropine  1 mg Intramuscular Q4H PRN Max 4/day HOLLI Lion      benztropine  1 mg Oral  Q4H PRN Max 6/day HOLLI Lion      benztropine  1 mg Oral Q4H PRN Max 6/day Ion FISCHER Holter, DO      bisacodyl  10 mg Rectal Daily PRN HOLLI Lion      calcium carbonate  500 mg Oral BID PRN HOLLI Monge      cloZAPine  250 mg Oral HS Bora Rosario MD      hydrOXYzine HCL  50 mg Oral Q6H PRN Max 4/day Ion C Holter, DO      Or    diphenhydrAMINE  50 mg Intramuscular Q6H PRN Ion FISCHER Holter, DO      hydrOXYzine HCL  50 mg Oral Q6H PRN Max 4/day HOLLI Lion      Or    diphenhydrAMINE  50 mg Intramuscular Q6H PRN HOLLI Lion      diphenhydrAMINE-zinc acetate   Topical BID PRN HOLLI Lion      docusate sodium  100 mg Oral BID Jourdna Dickens, DO      escitalopram  20 mg Oral Daily HOLLI Lion      famotidine  20 mg Oral BID PRN HOLLI Monge      fluticasone  1 spray Each Nare Daily PRN HOLLI Monge      glycopyrrolate  1 mg Oral BID (AM & Afternoon) Bora Rosario MD      glycopyrrolate  2 mg Oral HS Bora Rosario MD      haloperidol  1 mg Oral Q6H PRN HOLLI Lion      haloperidol  2.5 mg Oral Q4H PRN Max 4/day HOLLI Lion      haloperidol  5 mg Oral Q4H PRN Max 4/day HOLLI Lion      hydroCHLOROthiazide  12.5 mg Oral Daily Pia Mantilla, HOLLI      hydrocortisone   Topical 4x Daily PRN HOLLI Lion      hydrOXYzine HCL  100 mg Oral Q6H PRN Max 4/day Ion FISCHER Holter, DO      Or    LORazepam  2 mg Intramuscular Q6H PRN Ion FISCHER Holter, DO      hydrOXYzine HCL  100 mg Oral Q6H PRN Max 4/day HOLLI Lion      Or    LORazepam  2 mg Intramuscular Q6H PRN HOLLI Lion      hydrOXYzine HCL  25 mg Oral Q6H PRN Max 4/day Ion FISCHER Holter, DO      ibuprofen  600 mg Oral Q8H PRN HOLLI Lion      influenza vaccine  0.5 mL Intramuscular Once Bora Rosario MD      loratadine  10 mg Oral Daily Brina Guillen MD      magnesium hydroxide  30 mL Oral Once Jourdan Dickens DO      melatonin  3 mg  Oral HS PRN Bora Rosario MD      melatonin  9 mg Oral HS Bora Rosario MD      methocarbamol  500 mg Oral Q6H PRN HOLLI Lion      multivitamin-minerals  1 tablet Oral Daily Eileen GonzalezmiryamjaylenHOLLI      nicotine polacrilex  2 mg Oral Q4H PRN Bora Rosario MD      OLANZapine  5 mg Oral Q4H PRN Max 3/day [facility] Holter, DO      Or    OLANZapine  2.5 mg Intramuscular Q4H PRN Max 3/day [facility] Holter, DO      OLANZapine  5 mg Oral Q3H PRN Max 3/day [facility] Holter, DO      Or    OLANZapine  5 mg Intramuscular Q3H PRN Max 3/day [facility] Holter, DO      OLANZapine  2.5 mg Oral Q4H PRN Max 6/day [facility] Holter, DO      ondansetron  4 mg Oral Q6H PRN HOLLI Lion      pantoprazole  20 [facility] Hol[facility], DO      polyethylene glycol  17 g Oral Daily PRN Sofi Yoo, HOLLI      polyethylene glycol  17 g Oral Daily Jourdan Dickens, DO      propranolol  10 mg Oral Q12H KAYILE HOLLI Lion      senna-docusate sodium  1 tablet Oral Daily PRN [facility] Holter, DO      senna-docusate sodium  2 tablet Oral HS Jourdan Dickens DO      traZODone  50 mg Oral HS PRN HOLLI Lion      white petrolatum-mineral oil   Topical TID PRN HOLLI Lion         Counseling / Coordination of Care: Total floor / unit time spent today 15 minutes. Greater than 50% of total time was spent with the patient and / or family counseling and / or somewhat receptive to supportive listening and teaching positive coping skills to deal with symptom mangement.     Patient's Rights, confidentiality and exceptions to confidentiality, use of automated medical record, Behavioral Health Services staff access to medical record, and consent to treatment reviewed.    This note has been dictated and hence there may be problems with punctuation, spelling and formatting and if anyone has any concerns please address them to Dr. Rosario    Note written by Leidy WALLACE and supervised by Dr. Bora Rosario.     This note is  not shared with patient due to potential for making patient's condition worse by knowing the content of the note.

## 2025-01-04 NOTE — PLAN OF CARE
Problem: Alteration in Thoughts and Perception  Goal: Refrain from acting on delusional thinking/internal stimuli  Description: Interventions:  - Monitor patient closely, per order   - Utilize least restrictive measures   - Set reasonable limits, give positive feedback for acceptable   - Administer medications as ordered and monitor of potential side effects  Outcome: Progressing  Goal: Agree to be compliant with medication regime, as prescribed and report medication side effects  Description: Interventions:  - Offer appropriate PRN medication and supervise ingestion; conduct AIMS, as needed   Outcome: Progressing  Goal: Attend and participate in unit activities, including therapeutic, recreational, and educational groups  Description: Interventions:  -Encourage Visitation and family involvement in care  Outcome: Progressing  Goal: Complete daily ADLs, including personal hygiene independently, as able  Description: Interventions:  - Observe, teach, and assist patient with ADLS  - Monitor and promote a balance of rest/activity, with adequate nutrition and elimination   Outcome: Progressing     Problem: Ineffective Coping  Goal: Identifies ineffective coping skills  Outcome: Progressing  Goal: Demonstrates healthy coping skills  Outcome: Progressing  Goal: Participates in unit activities  Description: Interventions:  - Provide therapeutic environment   - Provide required programming   - Redirect inappropriate behaviors   Outcome: Progressing     Problem: Depression  Goal: Refrain from isolation  Description: Interventions:  - Develop a trusting relationship   - Encourage socialization   Outcome: Progressing  Goal: Refrain from self-neglect  Outcome: Progressing  Goal: Attend and participate in unit activities, including therapeutic, recreational, and educational groups  Description: Interventions:  - Provide therapeutic and educational activities daily, encourage attendance and participation, and document same in the  medical record   Outcome: Progressing     Problem: Anxiety  Goal: Anxiety is at manageable level  Description: Interventions:  - Assess and monitor patient's anxiety level.   - Monitor for signs and symptoms (heart palpitations, chest pain, shortness of breath, headaches, nausea, feeling jumpy, restlessness, irritable, apprehensive).   - Collaborate with interdisciplinary team and initiate plan and interventions as ordered.  - [place] patient to unit/surroundings  - Explain treatment plan  - Encourage participation in care  - Encourage verbalization of concerns/fears  - Identify coping mechanisms  - Assist in developing anxiety-reducing skills  - Administer/offer alternative therapies  - Limit or eliminate stimulants  Outcome: Progressing

## 2025-01-04 NOTE — ASSESSMENT & PLAN NOTE
Reviewed on 01/03/25  No significant changes still with same threatening voices as before not commanding and planning to have a virtual visit with his sisters  Continue medication as follows:  Continue Clozapine 250 mg QHS for psychosis - increased 12/11/24  To consider further careful titration  CBC w/diff and CK every 2 weeks  CK was increased to 960 on 12/12 and oral hydration encouraged, CK improved to 905 on 12/13/24. Repeat on 12/17/24 decreased to 674 and on 12/20/24 decreased to 448  Labs  drawn on 12/24/2024, repeat  trending down  Continue Abilify 30 mg daily for psychosis as he is resistant to single antipsychotic  Continue Lexapro 20 mg daily for depression and anxiety  Continue Robinul 1 mg BID and 2 mg QHS for sialorrhea from Clozapine  Continue Atropine 1% solution 1 drop sublingually QHS for adverse effects of Clozapine  Continue Propanolol 10 mg BID for anxiety   Continue Melatonin 9 mg QHS for sleep  Continue Senna-Docusate sodium one 50 mg tablet QHS for constipation    Pt remains on dual antipsychotic Tx due to failure on monotherapy   .  S/p ECT treatments.  - ACT team referral was made for him living back with his aunt once MA funding comes through from the [facility] and sister will try to reapply

## 2025-01-04 NOTE — NURSING NOTE
Pt is pleasant, calm and cooperative, visible on the unit, social with peers and staff. Pt reused breakfast, ate 100% of lunch. Pt reports his anxiety is OK and depression is still there. Pt reports AH voices saying they are going to kill him. Pt denies SI/HI/VH. Pt is medication compliant, safety checks ongoing.

## 2025-01-04 NOTE — PROGRESS NOTES
Progress Note - Behavioral Health   Name: Alberto Berumen 29 y.o. male I MRN: [id number]  Unit/Bed#: [facility] 112-02 I Date of Admission: 3/29/2024   Date of Service: 1/4/2025 I Hospital Day: 281     Assessment & Plan  Schizoaffective disorder, bipolar type (HCC)  Reviewed on 01/03/25  No significant changes still with same threatening voices as before not commanding and planning to have a virtual visit with his sisters  Continue medication as follows:  Continue Clozapine 250 mg QHS for psychosis - increased 12/11/24  To consider further careful titration  CBC w/diff and CK every 2 weeks  CK was increased to 960 on 12/12 and oral hydration encouraged, CK improved to 905 on 12/13/24. Repeat on 12/17/24 decreased to 674 and on 12/20/24 decreased to 448  Labs  drawn on 12/24/2024, repeat  trending down  Continue Abilify 30 mg daily for psychosis as he is resistant to single antipsychotic  Continue Lexapro 20 mg daily for depression and anxiety  Continue Robinul 1 mg BID and 2 mg QHS for sialorrhea from Clozapine  Continue Atropine 1% solution 1 drop sublingually QHS for adverse effects of Clozapine  Continue Propanolol 10 mg BID for anxiety   Continue Melatonin 9 mg QHS for sleep  Continue Senna-Docusate sodium one 50 mg tablet QHS for constipation    Pt remains on dual antipsychotic Tx due to failure on monotherapy   .  S/p ECT treatments.  - ACT team referral was made for him living back with his aunt once MA funding comes through from the [facility] and sister will try to reapply    Chronic idiopathic constipation  Reviewed on 01/03/25  Follows with medical team  Continue senna-docusate sodium dose per medical team.     GERD (gastroesophageal reflux disease)  Reviewed on 01/03/25  Follows with medical  Continue famotidine 20 mg tablet BID per medical team   Continue glycopyrrolate 1 mg tablet BID AM and afternoon per medical team  Continue glycopyrrolate 2 mg tablet before bed per medical team  Continue  pantoprazole EC 20 mg tablet every morning per medical team  This is a chronic condition, and is stable unless otherwise noted.    Tobacco abuse   Reviewed on 1/03/25  on nicotine gum as needed and encouraged smoking cessation  Continue to encourage cessation upon discharge    Elevated hemoglobin A1c  Reviewed on 1/03/25  Follows with medical team    Class 2 obesity in adult  Reviewed on 1/03/25  Follows with medical team and given dietary counseling and need for exercise    Primary hypertension  Reviewed on 1/03/25  Follows with medical team on hydrochlorothiazide and Norvasc    Psychiatry Progress Note [facility]    Progress towards goals: moderate progress    Review of systems: denied    Psychiatric Diagnosis: Schizoaffective disorder, bipolar type     Assessment  Overall Status:  progressing, no significant changes   Certification Statement: The patient will continue to require additional inpatient hospital stay due to psychotic related symptoms and limited response to medications and ECT.        Medications: as above  Side effects from treatment: denied   Medication changes: as above  Medication education: Risks side effects benefits and precautions of medications discussed with patient and they did verbalize an understanding about risks for metabolic syndrome from being on neuroleptics and tardive dyskinesia etc.  All medications reviewed and I recommend that they be continued for symptom management  Understanding of medications: Risks side effects benefits and precautions of medications discussed with patient and they did verbalize an understanding about risks for metabolic syndrome from being on neuroleptics and tardive dyskinesia etc., and patient appeared to have understanding.  Justification for dual anti-psychotics: due to not responding to single antipsychotic.    Non-pharmacological treatments  Continue with individual, group, milieu and occupational therapy using recovery  "principles and psycho-education about accepting illness and the need for treatment.  Behavioral health checks every 7 minutes    Safety  Safety and communication plan established to target dynamic risk factors discussed above.    Discharge Plan   To his Aunt with [facility] for ACT when received     Interval Progress: Per treatment team Alberto has been cooperative with his care and treatment plan. There have been no events in the last 24 hours. He attended 5/10 groups yesterday. He has been complaint with his medications and there was no need for psychotropic PRNs yesterday. Per treatment team his appetite is at baseline. He typically skips breakfast and then eats regularly the remainder of the day. Patient required PRN nicorette gum.     Today, Alberto reports his mood as \"stable\". He stated that he has a goal of continuing to work out today. He said that he had worked out yesterday as planned and that it made him feel better overall. He endorses continual auditory hallucinations that are saying they are going to kill him. They are not commanding and he denies the hallucinations involving his family or anyone else. He reports a stable appetite and good sleep the night prior. He denied SI, HI, or thoughts of self harm. He denied any medication side effects.    Acceptance by patient: accepts inpatient treatment and medications  Hopefulness in recovery: to live with his aunt in the community  Involved in reintegration process: communicates with siblings and aunt  Trusting in relationship with psychiatrist: trusting  Sleep: great  Appetite: stable  Compliance with Medications: complaint  Group attendance: 5/10 groups  Significant events: none in the last 24 hours     Mental Status Exam  Appearance:  age appropriate, casually dressed, adequate grooming  Behavior: cooperative, calm, lying in bed, initially sleeping but easily awoken and engaging in conversation.  Speech: normal rate, normal volume, normal pitch, " "coherent, not pressured or hyperverbal  Mood:  \"stable\"  Affect: constricted, mood-congruent  Thought Process: organized, logical, coherent, goal directed  Thought Content: mild paranoia, intrusive thoughts  Perceptual Disturbances: auditory hallucinations of voices that say they are going to kill him. Denies command hallucinations. Denies involvement of family and says hallucinations only involve him. Up until recently patient was reporting involvement of family in his auditory hallucinations. No visual hallucinations. Not observed responding to internal stimuli.  Risk Potential: Suicidal ideations none, homicidal ideations none, and potential for aggression not present at this time.   Sensorium: alert and oriented to person, place, time and situation  Cognition: recent and remote memory grossly intact  Consciousness: alert and awake  Attention: attention span and concentration are age appropriate  Intellect: appears to be of average intelligence  Insight: intact  Judgement: intact  Motor Activity: no abnormal movements     Vitals  Temp:  [97.7 °F (36.5 °C)-97.9 °F (36.6 °C)] 97.9 °F (36.6 °C)  HR:  [79-92] 79  Resp:  [18] 18  BP: (123-134)/(72-77) 123/77  SpO2:  [96 %-99 %] 99 %  No intake or output data in the 24 hours ending 01/04/25 1221    Lab Results: All Labs For Current Hospital Admission Reviewed      Current Facility-Administered Medications   Medication Dose Route Frequency Provider Last Rate    acetaminophen  650 mg Oral Q4H PRN Jordan C Holter, DO      acetaminophen  650 mg Oral Q6H PRN HOLLI Lion      aluminum-magnesium hydroxide-simethicone  30 mL Oral Q4H PRN Jordan C Holter, DO      amLODIPine  5 mg Oral Daily HOLLI Lion      ARIPiprazole  30 mg Oral Daily Bora Rosario MD      Artificial Tears  1 drop Both Eyes Q3H PRN Jordan C Holter, DO      atropine  1 drop Sublingual  Harjeet Torres DO      haloperidol lactate  2.5 mg Intramuscular Q4H PRN Max 4/day HOLLI Lion      " And    LORazepam  1 mg Intramuscular Q4H PRN Max 4/day STEVE LionNP      And    benztropine  0.5 mg Intramuscular Q4H PRN Max 4/day HOLLI Lion      benztropine  1 mg Intramuscular Q4H PRN Max 6/day Ion FISCHER Holter, DO      haloperidol lactate  5 mg Intramuscular Q4H PRN Max 4/day HOLLI Lion      And    LORazepam  2 mg Intramuscular Q4H PRN Max 4/day HOLLI Lion      And    benztropine  1 mg Intramuscular Q4H PRN Max 4/day HOLLI Lion      benztropine  1 mg Oral Q4H PRN Max 6/day HOLLI Lion      benztropine  1 mg Oral Q4H PRN Max 6/day Jordan C Holter, DO      bisacodyl  10 mg Rectal Daily PRN HOLLI Lion      calcium carbonate  500 mg Oral BID PRN HOLLI Monge      cloZAPine  250 mg Oral HS Bora Rosario MD      hydrOXYzine HCL  50 mg Oral Q6H PRN Max 4/day Jordan C Holter, DO      Or    diphenhydrAMINE  50 mg Intramuscular Q6H PRN Jordan C Holter, DO      hydrOXYzine HCL  50 mg Oral Q6H PRN Max 4/day HOLLI Lion      Or    diphenhydrAMINE  50 mg Intramuscular Q6H PRN HOLLI Lion      diphenhydrAMINE-zinc acetate   Topical BID PRN HOLLI Lion      docusate sodium  100 mg Oral BID Jourdan Dickens, DO      escitalopram  20 mg Oral Daily HOLLI Lion      famotidine  20 mg Oral BID PRN HOLLI Monge      fluticasone  1 spray Each Nare Daily PRN HOLLI Monge      glycopyrrolate  1 mg Oral BID (AM & Afternoon) Bora Rosario MD      glycopyrrolate  2 mg Oral HS Bora Rosario MD      haloperidol  1 mg Oral Q6H PRN HOLLI Lion      haloperidol  2.5 mg Oral Q4H PRN Max 4/day HOLLI Lion      haloperidol  5 mg Oral Q4H PRN Max 4/day HOLLI Lion      hydroCHLOROthiazide  12.5 mg Oral Daily Pia Mantilla, HOLLI      hydrocortisone   Topical 4x Daily PRN HOLLI Lion      hydrOXYzine HCL  100 mg Oral Q6H PRN Max 4/day Jordan C Holter, DO      Or    LORazepam  2 mg  Intramuscular Q6H PRN [facility] Holter, DO      hydrOXYzine HCL  100 mg Oral Q6H PRN Max 4/day HOLLI Lion      Or    LORazepam  2 mg Intramuscular Q6H PRN HOLLI Lion      hydrOXYzine HCL  25 mg Oral Q6H PRN Max 4/day [facility] Holter, DO      ibuprofen  600 mg Oral Q8H PRN HOLLI Lion      influenza vaccine  0.5 mL Intramuscular Once Bora Rosario MD      loratadine  10 mg Oral Daily Brina Guillen MD      magnesium hydroxide  30 mL Oral Once Jourdan Dickens, DO      melatonin  3 mg Oral HS PRN Bora Rosario MD      melatonin  9 mg Oral HS Bora Rosario MD      methocarbamol  500 mg Oral Q6H PRN HOLLI Lion      multivitamin-minerals  1 tablet Oral Daily Eileen CherryHOLLI Jimenez      nicotine polacrilex  2 mg Oral Q4H PRN Bora Rosario MD      OLANZapine  5 mg Oral Q4H PRN Max 3/day [facility] Holter, DO      Or    OLANZapine  2.5 mg Intramuscular Q4H PRN Max 3/day [facility] Holter, DO      OLANZapine  5 mg Oral Q3H PRN Max 3/day [facility] Holter, DO      Or    OLANZapine  5 mg Intramuscular Q3H PRN Max 3/day [facility] Holter, DO      OLANZapine  2.5 mg Oral Q4H PRN Max 6/day [facility] Holter, DO      ondansetron  4 mg Oral Q6H PRN HOLLI Lion      [facility]      polyethylene glycol  17 g Oral Daily PRN HOLLI Ghotra      polyethylene glycol  17 g Oral Daily Jourdan Dickens, DO      propranolol  10 mg Oral Q12H KAYLIE HOLLI Lion      senna-docusate sodium  1 tablet Oral Daily PRN [facility] Cresencioter, DO      senna-docusate sodium  2 tablet Oral HS Jourdan Dickens, DO      traZODone  50 mg Oral HS PRN HOLLI Lion      white petrolatum-mineral oil   Topical TID PRN HOLLI Lion         Counseling / Coordination of Care: Total floor / unit time spent today 15 minutes. Greater than 50% of total time was spent with the patient and / or family counseling and / or somewhat receptive to supportive listening and teaching positive  coping skills to deal with symptom mangement.     Patient's Rights, confidentiality and exceptions to confidentiality, use of automated medical record, Behavioral Health Services staff access to medical record, and consent to treatment reviewed.    This note has been dictated and hence there may be problems with punctuation, spelling and formatting and if anyone has any concerns please address them to Dr. Rosario    Note written by Leidy WALLACE and supervised by Dr. Bora Rosario.     This note is not shared with patient due to potential for making patient's condition worse by knowing the content of the note.      Progress Toward Goals: progressing    Recommended Treatment: Continue with group therapy, milieu therapy and occupational therapy.      Risks, benefits and possible side effects of Medications:   Risks, benefits, and possible side effects of medications explained to patient and patient verbalizes understanding.      History of Present Illness   Behavior over the last 24 hours:  unchanged  Sleep: normal  Appetite: normal  Medication side effects: No  ROS: no complaints    Subjective: Per nursing, Alberto has been compliant with medications, social and pleasant, visible in milieu, attending groups, reporting AH and some anxiety and depression. On evaluation today, Alberto is laying in bed with blanket covering his face. He does not remove blanket throughout evaluation. He reports having thoughts to hurt male peer due to conflict with peer. He denied intent to act on thoughts. He denies SI/HI. He reports some improvement in AH. He feels tired this morning but reports adequate sleep overnight.    Objective   Mental Status Evaluation:  Appearance:  casually dressed and blanket covering face   Behavior:  Cooperative, guarded   Speech:  soft and scant   Mood:  anxious   Affect:  constricted and mood-congruent   Thought Process:  goal directed   Associations: concrete associations   Thought Content:  Some  paranoia   Perceptual Disturbances: Auditory hallucinations without commands   Risk Potential: Suicidal Ideations none  Homicidal Ideations none. Thoughts to harm peer, denies thoughts to kill peer. Did not disclose plan, denied intent.   Potential for Aggression Yes due to peer conflict, thoughts to harm peer   Sensorium:  person, place, time/date, and situation   Memory:  recent and remote memory grossly intact   Consciousness:  alert and awake    Attention: attention span appeared shorter than expected for age   Insight:  limited   Judgment: limited   Gait/Station: Not observed   Motor Activity: no abnormal movements     Medications: all current active meds have been reviewed, continue current psychiatric medications, and current meds:   Current Facility-Administered Medications:     acetaminophen (TYLENOL) tablet 650 mg, Q4H PRN    acetaminophen (TYLENOL) tablet 650 mg, Q6H PRN    aluminum-magnesium hydroxide-simethicone (MAALOX) oral suspension 30 mL, Q4H PRN    amLODIPine (NORVASC) tablet 5 mg, Daily    ARIPiprazole (ABILIFY) tablet 30 mg, Daily    Artificial Tears ophthalmic solution 1 drop, Q3H PRN    atropine (ISOPTO ATROPINE) 1 % ophthalmic solution 1 drop, HS    haloperidol lactate (HALDOL) injection 2.5 mg, Q4H PRN Max 4/day **AND** LORazepam (ATIVAN) injection 1 mg, Q4H PRN Max 4/day **AND** benztropine (COGENTIN) injection 0.5 mg, Q4H PRN Max 4/day    benztropine (COGENTIN) injection 1 mg, Q4H PRN Max 6/day    haloperidol lactate (HALDOL) injection 5 mg, Q4H PRN Max 4/day **AND** LORazepam (ATIVAN) injection 2 mg, Q4H PRN Max 4/day **AND** benztropine (COGENTIN) injection 1 mg, Q4H PRN Max 4/day    benztropine (COGENTIN) tablet 1 mg, Q4H PRN Max 6/day    benztropine (COGENTIN) tablet 1 mg, Q4H PRN Max 6/day    bisacodyl (DULCOLAX) rectal suppository 10 mg, Daily PRN    calcium carbonate (TUMS) chewable tablet 500 mg, BID PRN    cloZAPine (CLOZARIL) tablet 250 mg, HS    hydrOXYzine HCL (ATARAX) tablet  50 mg, Q6H PRN Max 4/day **OR** diphenhydrAMINE (BENADRYL) injection 50 mg, Q6H PRN    hydrOXYzine HCL (ATARAX) tablet 50 mg, Q6H PRN Max 4/day **OR** diphenhydrAMINE (BENADRYL) injection 50 mg, Q6H PRN    diphenhydrAMINE-zinc acetate (BENADRYL) 2-0.1 % cream, BID PRN    docusate sodium (COLACE) capsule 100 mg, BID    escitalopram (LEXAPRO) tablet 20 mg, Daily    famotidine (PEPCID) tablet 20 mg, BID PRN    fluticasone (FLONASE) 50 mcg/act nasal spray 1 spray, Daily PRN    glycopyrrolate (ROBINUL) tablet 1 mg, BID (AM & Afternoon)    glycopyrrolate (ROBINUL) tablet 2 mg, HS    haloperidol (HALDOL) tablet 1 mg, Q6H PRN    haloperidol (HALDOL) tablet 2.5 mg, Q4H PRN Max 4/day    haloperidol (HALDOL) tablet 5 mg, Q4H PRN Max 4/day    hydroCHLOROthiazide tablet 12.5 mg, Daily    hydrocortisone 1 % ointment, 4x Daily PRN    hydrOXYzine HCL (ATARAX) tablet 100 mg, Q6H PRN Max 4/day **OR** LORazepam (ATIVAN) injection 2 mg, Q6H PRN    hydrOXYzine HCL (ATARAX) tablet 100 mg, Q6H PRN Max 4/day **OR** LORazepam (ATIVAN) injection 2 mg, Q6H PRN    hydrOXYzine HCL (ATARAX) tablet 25 mg, Q6H PRN Max 4/day    ibuprofen (MOTRIN) tablet 600 mg, Q8H PRN    influenza vaccine (PF) (Fluarix) IM injection 0.5 mL, Once    loratadine (CLARITIN) tablet 10 mg, Daily    magnesium hydroxide (MILK OF MAGNESIA) oral suspension 30 mL, Once    melatonin tablet 3 mg, HS PRN    melatonin tablet 9 mg, HS    methocarbamol (ROBAXIN) tablet 500 mg, Q6H PRN    multivitamin-minerals (CENTRUM) tablet 1 tablet, Daily    nicotine polacrilex (NICORETTE) gum 2 mg, Q4H PRN    OLANZapine (ZyPREXA) tablet 5 mg, Q4H PRN Max 3/day **OR** OLANZapine (ZyPREXA) IM injection 2.5 mg, Q4H PRN Max 3/day    OLANZapine (ZyPREXA) tablet 5 mg, Q3H PRN Max 3/day **OR** OLANZapine (ZyPREXA) IM injection 5 mg, Q3H PRN Max 3/day    OLANZapine (ZyPREXA) tablet 2.5 mg, Q4H PRN Max 6/day    ondansetron (ZOFRAN-ODT) dispersible tablet 4 mg, Q6H PRN    pantoprazole (PROTONIX) EC  tablet 20 mg, QAM    polyethylene glycol (MIRALAX) packet 17 g, Daily PRN    polyethylene glycol (MIRALAX) packet 17 g, Daily    propranolol (INDERAL) tablet 10 mg, Q12H KAYLIE    senna-docusate sodium (SENOKOT S) 8.6-50 mg per tablet 1 tablet, Daily PRN    senna-docusate sodium (SENOKOT S) 8.6-50 mg per tablet 2 tablet, HS    traZODone (DESYREL) tablet 50 mg, HS PRN    white petrolatum-mineral oil (EUCERIN,HYDROCERIN) cream, TID PRN.      Lab Results: I have reviewed the following results:  Most Recent Labs:   Lab Results   Component Value Date    WBC 8.66 12/24/2024    RBC 5.23 12/24/2024    HGB 12.5 12/24/2024    HCT 41.1 12/24/2024     12/24/2024    RDW 14.3 12/24/2024    NEUTROABS 3.84 12/24/2024    SODIUM 140 10/16/2024    K 3.8 10/16/2024     10/16/2024    CO2 29 10/16/2024    BUN 9 10/16/2024    CREATININE 0.91 10/16/2024    GLUC 94 10/16/2024    GLUF 94 10/16/2024    CALCIUM 9.5 10/16/2024    AST 26 09/30/2024    ALT 42 09/30/2024    ALKPHOS 64 09/30/2024    TP 6.6 09/30/2024    ALB 3.8 09/30/2024    TBILI 0.47 09/30/2024    CHOLESTEROL 156 03/14/2024    HDL 44 03/14/2024    TRIG 133 03/14/2024    LDLCALC 85 03/14/2024    NONHDLC 112 03/14/2024    LITHIUM 0.61 01/09/2024    [id number] 1.062 11/15/2023    HGBA1C 5.0 04/01/2024    EAG 97 04/01/2024

## 2025-01-04 NOTE — NURSING NOTE
Pt in bed asleep, observed eyes closed, and chest movement noted. 15 minute safety checks maintained. No unmet needs at this time. Will continue to monitor patient needs, sleep pattern, and behaviors.     0614: Patient has slept all night, 7+ hours of uninterrupted sleep

## 2025-01-05 PROCEDURE — 99232 SBSQ HOSP IP/OBS MODERATE 35: CPT

## 2025-01-05 RX ADMIN — GLYCOPYRROLATE 2 MG: 1 TABLET ORAL at 21:14

## 2025-01-05 RX ADMIN — NICOTINE POLACRILEX 2 MG: 2 GUM, CHEWING ORAL at 17:11

## 2025-01-05 RX ADMIN — PROPRANOLOL HYDROCHLORIDE 10 MG: 10 TABLET ORAL at 08:00

## 2025-01-05 RX ADMIN — PROPRANOLOL HYDROCHLORIDE 10 MG: 10 TABLET ORAL at 21:13

## 2025-01-05 RX ADMIN — MELATONIN TAB 3 MG 9 MG: 3 TAB at 21:14

## 2025-01-05 RX ADMIN — AMLODIPINE BESYLATE 5 MG: 5 TABLET ORAL at 08:01

## 2025-01-05 RX ADMIN — ARIPIPRAZOLE 30 MG: 15 TABLET ORAL at 08:00

## 2025-01-05 RX ADMIN — DOCUSATE SODIUM 100 MG: 100 CAPSULE, LIQUID FILLED ORAL at 08:00

## 2025-01-05 RX ADMIN — NICOTINE POLACRILEX 2 MG: 2 GUM, CHEWING ORAL at 08:00

## 2025-01-05 RX ADMIN — HYDROCHLOROTHIAZIDE 12.5 MG: 12.5 TABLET ORAL at 08:00

## 2025-01-05 RX ADMIN — NICOTINE POLACRILEX 2 MG: 2 GUM, CHEWING ORAL at 12:44

## 2025-01-05 RX ADMIN — GLYCOPYRROLATE 1 MG: 1 TABLET ORAL at 13:04

## 2025-01-05 RX ADMIN — PANTOPRAZOLE SODIUM 20 MG: 20 TABLET, DELAYED RELEASE ORAL at 08:00

## 2025-01-05 RX ADMIN — SENNOSIDES AND DOCUSATE SODIUM 2 TABLET: 50; 8.6 TABLET ORAL at 21:14

## 2025-01-05 RX ADMIN — ATROPINE SULFATE 1 DROP: 10 SOLUTION/ DROPS OPHTHALMIC at 21:13

## 2025-01-05 RX ADMIN — MULTIPLE VITAMINS W/ MINERALS TAB 1 TABLET: TAB ORAL at 08:01

## 2025-01-05 RX ADMIN — CLOZAPINE 250 MG: 25 TABLET ORAL at 21:14

## 2025-01-05 RX ADMIN — DOCUSATE SODIUM 100 MG: 100 CAPSULE, LIQUID FILLED ORAL at 17:10

## 2025-01-05 RX ADMIN — GLYCOPYRROLATE 1 MG: 1 TABLET ORAL at 08:00

## 2025-01-05 RX ADMIN — NICOTINE POLACRILEX 2 MG: 2 GUM, CHEWING ORAL at 21:52

## 2025-01-05 RX ADMIN — ESCITALOPRAM OXALATE 20 MG: 10 TABLET, FILM COATED ORAL at 08:00

## 2025-01-05 RX ADMIN — LORATADINE 10 MG: 10 TABLET ORAL at 08:00

## 2025-01-05 NOTE — ASSESSMENT & PLAN NOTE
Reviewed on 1/03/25  Follows with medical team on hydrochlorothiazide and Norvasc    Psychiatry Progress Note [facility]    Progress towards goals: moderate progress    Review of systems: denied    Psychiatric Diagnosis: Schizoaffective disorder, bipolar type     Assessment  Overall Status:  progressing, no significant changes   Certification Statement: The patient will continue to require additional inpatient hospital stay due to psychotic related symptoms and limited response to medications and ECT.        Medications: as above  Side effects from treatment: denied   Medication changes: as above  Medication education: Risks side effects benefits and precautions of medications discussed with patient and they did verbalize an understanding about risks for metabolic syndrome from being on neuroleptics and tardive dyskinesia etc.  All medications reviewed and I recommend that they be continued for symptom management  Understanding of medications: Risks side effects benefits and precautions of medications discussed with patient and they did verbalize an understanding about risks for metabolic syndrome from being on neuroleptics and tardive dyskinesia etc., and patient appeared to have understanding.  Justification for dual anti-psychotics: due to not responding to single antipsychotic.    Non-pharmacological treatments  Continue with individual, group, milieu and occupational therapy using recovery principles and psycho-education about accepting illness and the need for treatment.  Behavioral health checks every 7 minutes    Safety  Safety and communication plan established to target dynamic risk factors discussed above.    Discharge Plan   To his Aunt with [facility] for ACT when received     Interval Progress: Per treatment team Alberto has been cooperative with his care and treatment plan. There have been no events in the last 24 hours. He attended 5/10 groups yesterday. He has been  "complaint with his medications and there was no need for psychotropic PRNs yesterday. Per treatment team his appetite is at baseline. He typically skips breakfast and then eats regularly the remainder of the day. Patient required PRN nicorette gum.     Today, Alberto reports his mood as \"stable\". He stated that he has a goal of continuing to work out today. He said that he had worked out yesterday as planned and that it made him feel better overall. He endorses continual auditory hallucinations that are saying they are going to kill him. They are not commanding and he denies the hallucinations involving his family or anyone else. He reports a stable appetite and good sleep the night prior. He denied SI, HI, or thoughts of self harm. He denied any medication side effects.    Acceptance by patient: accepts inpatient treatment and medications  Hopefulness in recovery: to live with his aunt in the community  Involved in reintegration process: communicates with siblings and aunt  Trusting in relationship with psychiatrist: trusting  Sleep: great  Appetite: stable  Compliance with Medications: complaint  Group attendance: 5/10 groups  Significant events: none in the last 24 hours     Mental Status Exam  Appearance: age appropriate, casually dressed, adequate grooming  Behavior: cooperative, calm, lying in bed, initially sleeping but easily awoken and engaging in conversation.  Speech: normal rate, normal volume, normal pitch, coherent, not pressured or hyperverbal  Mood: \"stable\"  Affect: constricted, mood-congruent  Thought Process: organized, logical, coherent, goal directed  Thought Content: mild paranoia, intrusive thoughts  Perceptual Disturbances: auditory hallucinations of voices that say they are going to kill him. Denies command hallucinations. Denies involvement of family and says hallucinations only involve him. Up until recently patient was reporting involvement of family in his auditory hallucinations. No " visual hallucinations. Not observed responding to internal stimuli.  Risk Potential: Suicidal ideations none, homicidal ideations none, and potential for aggression not present at this time.   Sensorium: alert and oriented to person, place, time and situation  Cognition: recent and remote memory grossly intact  Consciousness: alert and awake  Attention: attention span and concentration are age appropriate  Intellect: appears to be of average intelligence  Insight: intact  Judgement: intact  Motor Activity: no abnormal movements     Vitals  Temp:  [96.5 °F (35.8 °C)-97.5 °F (36.4 °C)] 96.5 °F (35.8 °C)  HR:  [80-98] 80  Resp:  [18] 18  BP: (119-140)/(64-90) 123/64  SpO2:  [100 %] 100 %  No intake or output data in the 24 hours ending 01/05/25 0852    Lab Results: All Labs For Current Hospital Admission Reviewed      Current Facility-Administered Medications   Medication Dose Route Frequency Provider Last Rate    acetaminophen  650 mg Oral Q4H PRN Jordan C Holter, DO      acetaminophen  650 mg Oral Q6H PRN HOLLI Lion      aluminum-magnesium hydroxide-simethicone  30 mL Oral Q4H PRN Jordan C Holter, DO      amLODIPine  5 mg Oral Daily HOLLI Lion      ARIPiprazole  30 mg Oral Daily Bora Rosario MD      Artificial Tears  1 drop Both Eyes Q3H PRN Jordan C Holter, DO      atropine  1 drop Sublingual HS Harjeet Torres, DO      haloperidol lactate  2.5 mg Intramuscular Q4H PRN Max 4/day HOLLI Lion      And    LORazepam  1 mg Intramuscular Q4H PRN Max 4/day HOLLI Lion      And    benztropine  0.5 mg Intramuscular Q4H PRN Max 4/day HOLLI Lion      benztropine  1 mg Intramuscular Q4H PRN Max 6/day Jordan C Holter, DO      haloperidol lactate  5 mg Intramuscular Q4H PRN Max 4/day HOLLI Lion      And    LORazepam  2 mg Intramuscular Q4H PRN Max 4/day HOLLI Lion      And    benztropine  1 mg Intramuscular Q4H PRN Max 4/day HOLLI Lion      benztropine  1 mg Oral Q4H  PRN Max 6/day HOLLI Lion      benztropine  1 mg Oral Q4H PRN Max 6/day Ion C Holter, DO      bisacodyl  10 mg Rectal Daily PRN HOLLI Lion      calcium carbonate  500 mg Oral BID PRN Eileen Jensen, HOLLI      cloZAPine  250 mg Oral HS Bora Rosario MD      hydrOXYzine HCL  50 mg Oral Q6H PRN Max 4/day Ion C Holter, DO      Or    diphenhydrAMINE  50 mg Intramuscular Q6H PRN Ion C Holter, DO      hydrOXYzine HCL  50 mg Oral Q6H PRN Max 4/day HOLLI Lion      Or    diphenhydrAMINE  50 mg Intramuscular Q6H PRN HOLLI Lion      diphenhydrAMINE-zinc acetate   Topical BID PRN HOLLI Lion      docusate sodium  100 mg Oral BID Jourdan Dickens,       escitalopram  20 mg Oral Daily HOLLI Lion      famotidine  20 mg Oral BID PRN HOLLI Monge      fluticasone  1 spray Each Nare Daily PRN HOLLI Monge      glycopyrrolate  1 mg Oral BID (AM & Afternoon) Bora Rosario MD      glycopyrrolate  2 mg Oral HS Bora Rosario MD      haloperidol  1 mg Oral Q6H PRN HOLLI Lion      haloperidol  2.5 mg Oral Q4H PRN Max 4/day HOLLI Lion      haloperidol  5 mg Oral Q4H PRN Max 4/day HOLLI Lion      hydroCHLOROthiazide  12.5 mg Oral Daily Pia Mantilla, HOLLI      hydrocortisone   Topical 4x Daily PRN HOLLI Lion      hydrOXYzine HCL  100 mg Oral Q6H PRN Max 4/day Ion C Holter, DO      Or    LORazepam  2 mg Intramuscular Q6H PRN Ion C Holter, DO      hydrOXYzine HCL  100 mg Oral Q6H PRN Max 4/day HOLLI Lion      Or    LORazepam  2 mg Intramuscular Q6H PRN HOLLI Lion      hydrOXYzine HCL  25 mg Oral Q6H PRN Max 4/day Ion C Holter, DO      ibuprofen  600 mg Oral Q8H PRN HOLLI Lion      influenza vaccine  0.5 mL Intramuscular Once oBra Rosario MD      loratadine  10 mg Oral Daily Brina Guillen MD      magnesium hydroxide  30 mL Oral Once Jourdan Dickens DO      melatonin  3 mg Oral  HS PRN Bora Rosario MD      melatonin  9 mg Oral HS Bora Rosario MD      methocarbamol  500 mg Oral Q6H PRN HOLLI Lion      multivitamin-minerals  1 tablet Oral Daily Eileen JensenHOLLI      nicotine polacrilex  2 mg Oral Q4H PRN Bora Rosario MD      OLANZapine  5 mg Oral Q4H PRN Max 3/day [facility] Holter, DO      Or    OLANZapine  2.5 mg Intramuscular Q4H PRN Max 3/day [facility] Holter, DO      OLANZapine  5 mg Oral Q3H PRN Max 3/day [facility] Holter, DO      Or    OLANZapine  5 mg Intramuscular Q3H PRN Max 3/day [facility] Holter, DO      OLANZapine  2.5 mg Oral Q4H PRN Max 6/day [facility] Holter, DO      ondansetron  4 mg Oral Q6H PRN HOLLI Lion      pantoprazole  [facility]      polyethylene glycol  17 g Oral Daily PRN Sofi Yoo, HLOLI      polyethylene glycol  17 g Oral Daily Jourdan Dickens, DO      propranolol  10 mg Oral Q12H KAYLIE HOLLI Lion      senna-docusate sodium  1 tablet Oral Daily PRN [facility] Holter, DO      senna-docusate sodium  2 tablet Oral HS Jourdan Dickens,       traZODone  50 mg Oral HS PRN HOLLI Lion      white petrolatum-mineral oil   Topical TID PRN HOLLI Lion         Counseling / Coordination of Care: Total floor / unit time spent today 15 minutes. Greater than 50% of total time was spent with the patient and / or family counseling and / or somewhat receptive to supportive listening and teaching positive coping skills to deal with symptom mangement.     Patient's Rights, confidentiality and exceptions to confidentiality, use of automated medical record, Behavioral Health Services staff access to medical record, and consent to treatment reviewed.    This note has been dictated and hence there may be problems with punctuation, spelling and formatting and if anyone has any concerns please address them to Dr. Rosario    Note written by Leidy WALLACE and supervised by Dr. Bora Rosario.     This note is not  shared with patient due to potential for making patient's condition worse by knowing the content of the note.

## 2025-01-05 NOTE — NURSING NOTE
Alberto maintained on ongoing SAFE precaution without incident on this shift. Awake, alert,pleasant and cooperative.   Attended and participated in 7 out of 9 groups today. Continues to be compliant with medication regimen, Denies any pain or discomfort at site. Auditory Hallucination  persistently the same, he is using his coping skill.  Interacts well with his peers.  Behavior control..

## 2025-01-05 NOTE — ASSESSMENT & PLAN NOTE
Reviewed on 01/03/25  No significant changes still with same threatening voices as before not commanding and planning to have a virtual visit with his sisters  Continue medication as follows:  Continue Clozapine 250 mg QHS for psychosis - increased 12/11/24  To consider further careful titration  CBC w/diff and CK every 2 weeks  CK was increased to 960 on 12/12 and oral hydration encouraged, CK improved to 905 on 12/13/24. Repeat on 12/17/24 decreased to 674 and on 12/20/24 decreased to 448  Labs  drawn on 12/24/2024, repeat  trending down  Continue Abilify 30 mg daily for psychosis as he is resistant to single antipsychotic  Continue Lexapro 20 mg daily for depression and anxiety  Continue Robinul 1 mg BID and 2 mg QHS for sialorrhea from Clozapine  Continue Atropine 1% solution 1 drop sublingually QHS for adverse effects of Clozapine  Continue Propanolol 10 mg BID for anxiety   Continue Melatonin 9 mg QHS for sleep  Continue Senna-Docusate sodium one 50 mg tablet QHS for constipation    Pt remains on dual antipsychotic Tx due to failure on monotherapy   .  S/p ECT treatments.  - ACT team referral was made for him living back with his aunt once MA funding comes through from the [facility] and sister will try to reapply

## 2025-01-05 NOTE — NURSING NOTE
Patient was visible and sitting in the dining room this shift with peers.  Sitting in the quiet room as well. Only attended one group today  Seen on the phone talking to family and friends. Social,  pleasant and cooperative.  Comes to staff to have his needs met. Offered no complaints. No prn medications requested or required.

## 2025-01-05 NOTE — PROGRESS NOTES
Progress Note - Behavioral Health   Name: Alberto Berumen 29 y.o. male I MRN: [id number]  Unit/Bed#: [facility] 112-02 I Date of Admission: 3/29/2024   Date of Service: 1/5/2025 I Hospital Day: 282     Assessment & Plan  Schizoaffective disorder, bipolar type (HCC)  Reviewed on 01/03/25  No significant changes still with same threatening voices as before not commanding and planning to have a virtual visit with his sisters  Continue medication as follows:  Continue Clozapine 250 mg QHS for psychosis - increased 12/11/24  To consider further careful titration  CBC w/diff and CK every 2 weeks  CK was increased to 960 on 12/12 and oral hydration encouraged, CK improved to 905 on 12/13/24. Repeat on 12/17/24 decreased to 674 and on 12/20/24 decreased to 448  Labs  drawn on 12/24/2024, repeat  trending down  Continue Abilify 30 mg daily for psychosis as he is resistant to single antipsychotic  Continue Lexapro 20 mg daily for depression and anxiety  Continue Robinul 1 mg BID and 2 mg QHS for sialorrhea from Clozapine  Continue Atropine 1% solution 1 drop sublingually QHS for adverse effects of Clozapine  Continue Propanolol 10 mg BID for anxiety   Continue Melatonin 9 mg QHS for sleep  Continue Senna-Docusate sodium one 50 mg tablet QHS for constipation    Pt remains on dual antipsychotic Tx due to failure on monotherapy   .  S/p ECT treatments.  - ACT team referral was made for him living back with his aunt once MA funding comes through from the [facility] and sister will try to reapply    Chronic idiopathic constipation  Reviewed on 01/03/25  Follows with medical team  Continue senna-docusate sodium dose per medical team.     GERD (gastroesophageal reflux disease)  Reviewed on 01/03/25  Follows with medical  Continue famotidine 20 mg tablet BID per medical team   Continue glycopyrrolate 1 mg tablet BID AM and afternoon per medical team  Continue glycopyrrolate 2 mg tablet before bed per medical team  Continue  pantoprazole EC 20 mg tablet every morning per medical team  This is a chronic condition, and is stable unless otherwise noted.    Tobacco abuse   Reviewed on 1/03/25  on nicotine gum as needed and encouraged smoking cessation  Continue to encourage cessation upon discharge    Elevated hemoglobin A1c  Reviewed on 1/03/25  Follows with medical team    Class 2 obesity in adult  Reviewed on 1/03/25  Follows with medical team and given dietary counseling and need for exercise    Primary hypertension  Reviewed on 1/03/25  Follows with medical team on hydrochlorothiazide and Norvasc    Psychiatry Progress Note [facility]    Progress towards goals: moderate progress    Review of systems: denied    Psychiatric Diagnosis: Schizoaffective disorder, bipolar type     Assessment  Overall Status:  progressing, no significant changes   Certification Statement: The patient will continue to require additional inpatient hospital stay due to psychotic related symptoms and limited response to medications and ECT.        Medications: as above  Side effects from treatment: denied   Medication changes: as above  Medication education: Risks side effects benefits and precautions of medications discussed with patient and they did verbalize an understanding about risks for metabolic syndrome from being on neuroleptics and tardive dyskinesia etc.  All medications reviewed and I recommend that they be continued for symptom management  Understanding of medications: Risks side effects benefits and precautions of medications discussed with patient and they did verbalize an understanding about risks for metabolic syndrome from being on neuroleptics and tardive dyskinesia etc., and patient appeared to have understanding.  Justification for dual anti-psychotics: due to not responding to single antipsychotic.    Non-pharmacological treatments  Continue with individual, group, milieu and occupational therapy using recovery  "principles and psycho-education about accepting illness and the need for treatment.  Behavioral health checks every 7 minutes    Safety  Safety and communication plan established to target dynamic risk factors discussed above.    Discharge Plan   To his Aunt with [facility] for ACT when received     Interval Progress: Per treatment team Alberto has been cooperative with his care and treatment plan. There have been no events in the last 24 hours. He attended 5/10 groups yesterday. He has been complaint with his medications and there was no need for psychotropic PRNs yesterday. Per treatment team his appetite is at baseline. He typically skips breakfast and then eats regularly the remainder of the day. Patient required PRN nicorette gum.     Today, Alberto reports his mood as \"stable\". He stated that he has a goal of continuing to work out today. He said that he had worked out yesterday as planned and that it made him feel better overall. He endorses continual auditory hallucinations that are saying they are going to kill him. They are not commanding and he denies the hallucinations involving his family or anyone else. He reports a stable appetite and good sleep the night prior. He denied SI, HI, or thoughts of self harm. He denied any medication side effects.    Acceptance by patient: accepts inpatient treatment and medications  Hopefulness in recovery: to live with his aunt in the community  Involved in reintegration process: communicates with siblings and aunt  Trusting in relationship with psychiatrist: trusting  Sleep: great  Appetite: stable  Compliance with Medications: complaint  Group attendance: 5/10 groups  Significant events: none in the last 24 hours     Mental Status Exam  Appearance:  age appropriate, casually dressed, adequate grooming  Behavior: cooperative, calm, lying in bed, initially sleeping but easily awoken and engaging in conversation.  Speech: normal rate, normal volume, normal pitch, " "coherent, not pressured or hyperverbal  Mood:  \"stable\"  Affect: constricted, mood-congruent  Thought Process: organized, logical, coherent, goal directed  Thought Content: mild paranoia, intrusive thoughts  Perceptual Disturbances: auditory hallucinations of voices that say they are going to kill him. Denies command hallucinations. Denies involvement of family and says hallucinations only involve him. Up until recently patient was reporting involvement of family in his auditory hallucinations. No visual hallucinations. Not observed responding to internal stimuli.  Risk Potential: Suicidal ideations none, homicidal ideations none, and potential for aggression not present at this time.   Sensorium: alert and oriented to person, place, time and situation  Cognition: recent and remote memory grossly intact  Consciousness: alert and awake  Attention: attention span and concentration are age appropriate  Intellect: appears to be of average intelligence  Insight: intact  Judgement: intact  Motor Activity: no abnormal movements     Vitals  Temp:  [96.5 °F (35.8 °C)-97.5 °F (36.4 °C)] 96.5 °F (35.8 °C)  HR:  [80-98] 80  Resp:  [18] 18  BP: (119-140)/(64-90) 123/64  SpO2:  [100 %] 100 %  No intake or output data in the 24 hours ending 01/05/25 0852    Lab Results: All Labs For Current Hospital Admission Reviewed      Current Facility-Administered Medications   Medication Dose Route Frequency Provider Last Rate    acetaminophen  650 mg Oral Q4H PRN Jordan C Holter, DO      acetaminophen  650 mg Oral Q6H PRN HOLLI Lion      aluminum-magnesium hydroxide-simethicone  30 mL Oral Q4H PRN Jordan C Holter, DO      amLODIPine  5 mg Oral Daily HOLLI Lion      ARIPiprazole  30 mg Oral Daily Bora Rosario MD      Artificial Tears  1 drop Both Eyes Q3H PRN Jordan C Holter, DO      atropine  1 drop Sublingual  Harjeet Torres DO      haloperidol lactate  2.5 mg Intramuscular Q4H PRN Max 4/day HOLLI Lion      And "    LORazepam  1 mg Intramuscular Q4H PRN Max 4/day HOLLI Lion      And    benztropine  0.5 mg Intramuscular Q4H PRN Max 4/day HOLLI Lion      benztropine  1 mg Intramuscular Q4H PRN Max 6/day Ion FISCHER Holter, DO      haloperidol lactate  5 mg Intramuscular Q4H PRN Max 4/day HOLLI Lion      And    LORazepam  2 mg Intramuscular Q4H PRN Max 4/day HOLLI Lion      And    benztropine  1 mg Intramuscular Q4H PRN Max 4/day HOLLI Lion      benztropine  1 mg Oral Q4H PRN Max 6/day HOLLI Lion      benztropine  1 mg Oral Q4H PRN Max 6/day Jordan C Holter, DO      bisacodyl  10 mg Rectal Daily PRN HOLLI Lion      calcium carbonate  500 mg Oral BID PRN HOLLI Monge      cloZAPine  250 mg Oral HS Bora Rosario MD      hydrOXYzine HCL  50 mg Oral Q6H PRN Max 4/day Jordan C Holter, DO      Or    diphenhydrAMINE  50 mg Intramuscular Q6H PRN Jordan C Holter, DO      hydrOXYzine HCL  50 mg Oral Q6H PRN Max 4/day HOLLI Lion      Or    diphenhydrAMINE  50 mg Intramuscular Q6H PRN HOLLI Lion      diphenhydrAMINE-zinc acetate   Topical BID PRN HOLLI Lion      docusate sodium  100 mg Oral BID Jourdan Dickens, DO      escitalopram  20 mg Oral Daily HOLLI Lion      famotidine  20 mg Oral BID PRN HOLLI Monge      fluticasone  1 spray Each Nare Daily PRN HOLLI Monge      glycopyrrolate  1 mg Oral BID (AM & Afternoon) Bora Rosario MD      glycopyrrolate  2 mg Oral HS Bora Rosario MD      haloperidol  1 mg Oral Q6H PRN HOLLI Lion      haloperidol  2.5 mg Oral Q4H PRN Max 4/day HOLLI Lion      haloperidol  5 mg Oral Q4H PRN Max 4/day HOLLI Lion      hydroCHLOROthiazide  12.5 mg Oral Daily HOLLI Galvan      hydrocortisone   Topical 4x Daily PRN HOLLI Lion      hydrOXYzine HCL  100 mg Oral Q6H PRN Max 4/day Jordan C Holter, DO      Or    LORazepam  2 mg  Intramuscular Q6H PRN [facility] Holter, DO      hydrOXYzine HCL  100 mg Oral Q6H PRN Max 4/day HOLLI Lion      Or    LORazepam  2 mg Intramuscular Q6H PRN HOLLI Lion      hydrOXYzine HCL  25 mg Oral Q6H PRN Max 4/day [facility] [address] Holter, DO      ibuprofen  600 mg Oral Q8H PRN HOLLI Lion      influenza vaccine  0.5 mL Intramuscular Once Bora Rosario MD      loratadine  10 mg Oral Daily Brina Guillen MD      magnesium hydroxide  30 mL Oral Once Jourdan Dickens, DO      melatonin  3 mg Oral HS PRN Bora Rosario MD      melatonin  9 mg Oral HS Bora Rosario MD      methocarbamol  500 mg Oral Q6H PRN HOLLI Lion      multivitamin-minerals  1 tablet Oral Daily Eileen CherryHOLLI Jimenez      nicotine polacrilex  2 mg Oral Q4H PRN Bora Rosario MD      OLANZapine  5 mg Oral Q4H PRN Max 3/day [facility] [address] Holter, DO      Or    OLANZapine  2.5 mg Intramuscular Q4H PRN Max 3/day [facility] [address] Holter, DO      OLANZapine  5 mg Oral Q3H PRN Max 3/day [facility] [address] Holter, DO      Or    OLANZapine  5 mg Intramuscular Q3H PRN Max 3/day [facility] [address] Holter, DO      OLANZapine  2.5 mg Oral Q4H PRN Max 6/day [facility] [address] Holter, DO      ondansetron  4 mg Oral Q6H PRN HOLLI Lion      pantoprazole  [facility]      polyethylene glycol  17 g Oral Daily PRN HOLLI Ghotra      polyethylene glycol  17 g Oral Daily Jourdan Dickens, DO      propranolol  10 mg Oral Q12H KAYLIE HOLLI Lion      senna-docusate sodium  1 tablet Oral Daily PRN [facility] [address] Cresencioter, DO      senna-docusate sodium  2 tablet Oral HS Jourdan Dickens, DO      traZODone  50 mg Oral HS PRN HOLLI Lion      white petrolatum-mineral oil   Topical TID PRN HOLLI Lion         Counseling / Coordination of Care: Total floor / unit time spent today 15 minutes. Greater than 50% of total time was spent with the patient and / or family counseling and / or somewhat receptive to supportive listening and teaching positive  "coping skills to deal with symptom mangement.     Patient's Rights, confidentiality and exceptions to confidentiality, use of automated medical record, Behavioral Health Services staff access to medical record, and consent to treatment reviewed.    This note has been dictated and hence there may be problems with punctuation, spelling and formatting and if anyone has any concerns please address them to Dr. Rosario    Note written by Leidy WALLACE and supervised by Dr. Bora Rosario.     This note is not shared with patient due to potential for making patient's condition worse by knowing the content of the note.      Progress Toward Goals: progressing    Recommended Treatment: Continue with group therapy, milieu therapy and occupational therapy.      Risks, benefits and possible side effects of Medications:   Risks, benefits, and possible side effects of medications explained to patient and patient verbalizes understanding.      History of Present Illness   Behavior over the last 24 hours:  unchanged  Sleep: normal  Appetite: normal  Medication side effects: No  ROS: no complaints    Subjective: Per nursing, Alberto was visible in the milieu yesterday, social with peers, compliant with meals and medications, reporting continued AH saying they are going to kill him, attended 7/9 groups. On evaluation today, Alberto is found sleeping in bed. He does not remove blanket from face during evaluation. He remains scant and guarded. He reports AH are \"the same.\" He denies SI/HI or medication side effects.     Objective   Mental Status Evaluation:  Appearance:  disheveled   Behavior:  guarded and covered with blanket, does not remove blanket from face during evaluation   Speech:  soft and scant   Mood:  anxious and irritable   Affect:  SANTIAGO   Thought Process:  goal directed   Associations: concrete associations   Thought Content:  Some paranoia   Perceptual Disturbances: Auditory hallucinations without commands   Risk " Potential: Suicidal Ideations none  Homicidal Ideations none  Potential for Aggression No   Sensorium:  person, place, time/date, and situation   Memory:  recent and remote memory grossly intact   Consciousness:  alert and awake    Attention: attention span appeared shorter than expected for age   Insight:  limited   Judgment: limited   Gait/Station: Not observed   Motor Activity: no abnormal movements     Medications: all current active meds have been reviewed, continue current psychiatric medications, and current meds:   Current Facility-Administered Medications:     acetaminophen (TYLENOL) tablet 650 mg, Q4H PRN    acetaminophen (TYLENOL) tablet 650 mg, Q6H PRN    aluminum-magnesium hydroxide-simethicone (MAALOX) oral suspension 30 mL, Q4H PRN    amLODIPine (NORVASC) tablet 5 mg, Daily    ARIPiprazole (ABILIFY) tablet 30 mg, Daily    Artificial Tears ophthalmic solution 1 drop, Q3H PRN    atropine (ISOPTO ATROPINE) 1 % ophthalmic solution 1 drop, HS    haloperidol lactate (HALDOL) injection 2.5 mg, Q4H PRN Max 4/day **AND** LORazepam (ATIVAN) injection 1 mg, Q4H PRN Max 4/day **AND** benztropine (COGENTIN) injection 0.5 mg, Q4H PRN Max 4/day    benztropine (COGENTIN) injection 1 mg, Q4H PRN Max 6/day    haloperidol lactate (HALDOL) injection 5 mg, Q4H PRN Max 4/day **AND** LORazepam (ATIVAN) injection 2 mg, Q4H PRN Max 4/day **AND** benztropine (COGENTIN) injection 1 mg, Q4H PRN Max 4/day    benztropine (COGENTIN) tablet 1 mg, Q4H PRN Max 6/day    benztropine (COGENTIN) tablet 1 mg, Q4H PRN Max 6/day    bisacodyl (DULCOLAX) rectal suppository 10 mg, Daily PRN    calcium carbonate (TUMS) chewable tablet 500 mg, BID PRN    cloZAPine (CLOZARIL) tablet 250 mg, HS    hydrOXYzine HCL (ATARAX) tablet 50 mg, Q6H PRN Max 4/day **OR** diphenhydrAMINE (BENADRYL) injection 50 mg, Q6H PRN    hydrOXYzine HCL (ATARAX) tablet 50 mg, Q6H PRN Max 4/day **OR** diphenhydrAMINE (BENADRYL) injection 50 mg, Q6H PRN     diphenhydrAMINE-zinc acetate (BENADRYL) 2-0.1 % cream, BID PRN    docusate sodium (COLACE) capsule 100 mg, BID    escitalopram (LEXAPRO) tablet 20 mg, Daily    famotidine (PEPCID) tablet 20 mg, BID PRN    fluticasone (FLONASE) 50 mcg/act nasal spray 1 spray, Daily PRN    glycopyrrolate (ROBINUL) tablet 1 mg, BID (AM & Afternoon)    glycopyrrolate (ROBINUL) tablet 2 mg, HS    haloperidol (HALDOL) tablet 1 mg, Q6H PRN    haloperidol (HALDOL) tablet 2.5 mg, Q4H PRN Max 4/day    haloperidol (HALDOL) tablet 5 mg, Q4H PRN Max 4/day    hydroCHLOROthiazide tablet 12.5 mg, Daily    hydrocortisone 1 % ointment, 4x Daily PRN    hydrOXYzine HCL (ATARAX) tablet 100 mg, Q6H PRN Max 4/day **OR** LORazepam (ATIVAN) injection 2 mg, Q6H PRN    hydrOXYzine HCL (ATARAX) tablet 100 mg, Q6H PRN Max 4/day **OR** LORazepam (ATIVAN) injection 2 mg, Q6H PRN    hydrOXYzine HCL (ATARAX) tablet 25 mg, Q6H PRN Max 4/day    ibuprofen (MOTRIN) tablet 600 mg, Q8H PRN    influenza vaccine (PF) (Fluarix) IM injection 0.5 mL, Once    loratadine (CLARITIN) tablet 10 mg, Daily    magnesium hydroxide (MILK OF MAGNESIA) oral suspension 30 mL, Once    melatonin tablet 3 mg, HS PRN    melatonin tablet 9 mg, HS    methocarbamol (ROBAXIN) tablet 500 mg, Q6H PRN    multivitamin-minerals (CENTRUM) tablet 1 tablet, Daily    nicotine polacrilex (NICORETTE) gum 2 mg, Q4H PRN    OLANZapine (ZyPREXA) tablet 5 mg, Q4H PRN Max 3/day **OR** OLANZapine (ZyPREXA) IM injection 2.5 mg, Q4H PRN Max 3/day    OLANZapine (ZyPREXA) tablet 5 mg, Q3H PRN Max 3/day **OR** OLANZapine (ZyPREXA) IM injection 5 mg, Q3H PRN Max 3/day    OLANZapine (ZyPREXA) tablet 2.5 mg, Q4H PRN Max 6/day    ondansetron (ZOFRAN-ODT) dispersible tablet 4 mg, Q6H PRN    pantoprazole (PROTONIX) EC tablet 20 mg, QAM    polyethylene glycol (MIRALAX) packet 17 g, Daily PRN    polyethylene glycol (MIRALAX) packet 17 g, Daily    propranolol (INDERAL) tablet 10 mg, Q12H KAYLIE    senna-docusate sodium (SENOKOT S)  8.6-50 mg per tablet 1 tablet, Daily PRN    senna-docusate sodium (SENOKOT S) 8.6-50 mg per tablet 2 tablet, HS    traZODone (DESYREL) tablet 50 mg, HS PRN    white petrolatum-mineral oil (EUCERIN,HYDROCERIN) cream, TID PRN.      Lab Results: I have reviewed the following results:  Most Recent Labs:   Lab Results   Component Value Date    WBC 8.66 12/24/2024    RBC 5.23 12/24/2024    HGB 12.5 12/24/2024    HCT 41.1 12/24/2024     12/24/2024    RDW 14.3 12/24/2024    NEUTROABS 3.84 12/24/2024    SODIUM 140 10/16/2024    K 3.8 10/16/2024     10/16/2024    CO2 29 10/16/2024    BUN 9 10/16/2024    CREATININE 0.91 10/16/2024    GLUC 94 10/16/2024    GLUF 94 10/16/2024    CALCIUM 9.5 10/16/2024    AST 26 09/30/2024    ALT 42 09/30/2024    ALKPHOS 64 09/30/2024    TP 6.6 09/30/2024    ALB 3.8 09/30/2024    TBILI 0.47 09/30/2024    CHOLESTEROL 156 03/14/2024    HDL 44 03/14/2024    TRIG 133 03/14/2024    LDLCALC 85 03/14/2024    NONHDLC 112 03/14/2024    LITHIUM 0.61 01/09/2024    GRA4TKJEUGWE 1.062 11/15/2023    HGBA1C 5.0 04/01/2024    EAG 97 04/01/2024

## 2025-01-06 PROCEDURE — 99232 SBSQ HOSP IP/OBS MODERATE 35: CPT | Performed by: PSYCHIATRY & NEUROLOGY

## 2025-01-06 RX ADMIN — MULTIPLE VITAMINS W/ MINERALS TAB 1 TABLET: TAB ORAL at 08:38

## 2025-01-06 RX ADMIN — NICOTINE POLACRILEX 2 MG: 2 GUM, CHEWING ORAL at 21:20

## 2025-01-06 RX ADMIN — MELATONIN TAB 3 MG 9 MG: 3 TAB at 21:19

## 2025-01-06 RX ADMIN — AMLODIPINE BESYLATE 5 MG: 5 TABLET ORAL at 08:39

## 2025-01-06 RX ADMIN — ATROPINE SULFATE 1 DROP: 10 SOLUTION/ DROPS OPHTHALMIC at 21:19

## 2025-01-06 RX ADMIN — NICOTINE POLACRILEX 2 MG: 2 GUM, CHEWING ORAL at 17:06

## 2025-01-06 RX ADMIN — GLYCOPYRROLATE 1 MG: 1 TABLET ORAL at 15:46

## 2025-01-06 RX ADMIN — SENNOSIDES AND DOCUSATE SODIUM 2 TABLET: 50; 8.6 TABLET ORAL at 21:20

## 2025-01-06 RX ADMIN — PROPRANOLOL HYDROCHLORIDE 10 MG: 10 TABLET ORAL at 08:39

## 2025-01-06 RX ADMIN — PROPRANOLOL HYDROCHLORIDE 10 MG: 10 TABLET ORAL at 21:21

## 2025-01-06 RX ADMIN — HYDROCHLOROTHIAZIDE 12.5 MG: 12.5 TABLET ORAL at 08:38

## 2025-01-06 RX ADMIN — GLYCOPYRROLATE 2 MG: 1 TABLET ORAL at 21:20

## 2025-01-06 RX ADMIN — DOCUSATE SODIUM 100 MG: 100 CAPSULE, LIQUID FILLED ORAL at 08:39

## 2025-01-06 RX ADMIN — NICOTINE POLACRILEX 2 MG: 2 GUM, CHEWING ORAL at 12:32

## 2025-01-06 RX ADMIN — LORATADINE 10 MG: 10 TABLET ORAL at 08:39

## 2025-01-06 RX ADMIN — PANTOPRAZOLE SODIUM 20 MG: 20 TABLET, DELAYED RELEASE ORAL at 08:38

## 2025-01-06 RX ADMIN — ESCITALOPRAM OXALATE 20 MG: 10 TABLET, FILM COATED ORAL at 08:39

## 2025-01-06 RX ADMIN — DOCUSATE SODIUM 100 MG: 100 CAPSULE, LIQUID FILLED ORAL at 17:06

## 2025-01-06 RX ADMIN — GLYCOPYRROLATE 1 MG: 1 TABLET ORAL at 08:38

## 2025-01-06 RX ADMIN — ARIPIPRAZOLE 30 MG: 15 TABLET ORAL at 08:39

## 2025-01-06 RX ADMIN — CLOZAPINE 250 MG: 25 TABLET ORAL at 21:19

## 2025-01-06 RX ADMIN — NICOTINE POLACRILEX 2 MG: 2 GUM, CHEWING ORAL at 08:41

## 2025-01-06 NOTE — ASSESSMENT & PLAN NOTE
Reviewed on 1/06/25  Follows with medical team and given dietary counseling and need for exercise

## 2025-01-06 NOTE — NURSING NOTE
Pt is calm and cooperative, visible on the unit, social with peers and staff. Pt ate 100% for lunch, pt continues to report AH, mild anxiety and depression, denies SI/HI/VH. Pt is medication compliant, safety checks ongoing.

## 2025-01-06 NOTE — ASSESSMENT & PLAN NOTE
Reviewed on 1/06/25  on nicotine gum as needed and encouraged smoking cessation  Continue to encourage cessation upon discharge

## 2025-01-06 NOTE — ASSESSMENT & PLAN NOTE
Reviewed on 01/06/25  No significant changes still with same threatening voices as before not commanding and planning to have a virtual visit with his sisters  Continue medication as follows:  Continue Clozapine 250 mg QHS for psychosis - increased 12/11/24  To consider further careful titration  CBC w/diff and CK every 2 weeks  CK was increased to 960 on 12/12 and oral hydration encouraged, CK improved to 905 on 12/13/24. Repeat on 12/17/24 decreased to 674 and on 12/20/24 decreased to 448  Labs  drawn on 12/24/2024, repeat  trending down  Continue Abilify 30 mg daily for psychosis as he is resistant to single antipsychotic  Continue Lexapro 20 mg daily for depression and anxiety  Continue Robinul 1 mg BID and 2 mg QHS for sialorrhea from Clozapine  Continue Atropine 1% solution 1 drop sublingually QHS for adverse effects of Clozapine  Continue Propanolol 10 mg BID for anxiety   Continue Melatonin 9 mg QHS for sleep  Continue Senna-Docusate sodium one 50 mg tablet QHS for constipation    Pt remains on dual antipsychotic Tx due to failure on monotherapy   .  S/p ECT treatments.  - ACT team referral was made for him living back with his aunt once MA funding comes through from the [facility] and sister will try to reapply

## 2025-01-06 NOTE — PROGRESS NOTES
Progress Note - Behavioral Health   Name: Alberto Berumen 29 y.o. male I MRN: [id number]  Unit/Bed#: [facility] 112-02 I Date of Admission: 3/29/2024   Date of Service: 1/6/2025 I Hospital Day: 283     Assessment & Plan  Schizoaffective disorder, bipolar type (HCC)  Reviewed on 01/06/25  No significant changes still with same threatening voices as before not commanding and planning to have a virtual visit with his sisters  Continue medication as follows:  Continue Clozapine 250 mg QHS for psychosis - increased 12/11/24  To consider further careful titration  CBC w/diff and CK every 2 weeks  CK was increased to 960 on 12/12 and oral hydration encouraged, CK improved to 905 on 12/13/24. Repeat on 12/17/24 decreased to 674 and on 12/20/24 decreased to 448  Labs  drawn on 12/24/2024, repeat  trending down  Continue Abilify 30 mg daily for psychosis as he is resistant to single antipsychotic  Continue Lexapro 20 mg daily for depression and anxiety  Continue Robinul 1 mg BID and 2 mg QHS for sialorrhea from Clozapine  Continue Atropine 1% solution 1 drop sublingually QHS for adverse effects of Clozapine  Continue Propanolol 10 mg BID for anxiety   Continue Melatonin 9 mg QHS for sleep  Continue Senna-Docusate sodium one 50 mg tablet QHS for constipation    Pt remains on dual antipsychotic Tx due to failure on monotherapy   .  S/p ECT treatments.  - ACT team referral was made for him living back with his aunt once MA funding comes through from the [facility] and sister will try to reapply    Chronic idiopathic constipation  Reviewed on 01/06/25  Follows with medical team  Continue senna-docusate sodium dose per medical team.     GERD (gastroesophageal reflux disease)  Reviewed on 01/06/25  Follows with medical  Continue famotidine 20 mg tablet BID per medical team   Continue glycopyrrolate 1 mg tablet BID AM and afternoon per medical team  Continue glycopyrrolate 2 mg tablet before bed per medical team  Continue  pantoprazole EC 20 mg tablet every morning per medical team  This is a chronic condition, and is stable unless otherwise noted.    Tobacco abuse   Reviewed on 1/06/25  on nicotine gum as needed and encouraged smoking cessation  Continue to encourage cessation upon discharge    Elevated hemoglobin A1c  Reviewed on 1/06/25  Follows with medical team    Class 2 obesity in adult  Reviewed on 1/06/25  Follows with medical team and given dietary counseling and need for exercise    Primary hypertension  Reviewed on 1/06/25  Follows with medical team on hydrochlorothiazide and Norvasc          Psychiatry Progress Note [facility]    Progress towards goals: moderate progress    Review of systems: denied    Psychiatric Diagnosis: Schizoaffective disorder, bipolar type     Assessment  Overall Status:  progressing, no significant changes   Certification Statement: The patient will continue to require additional inpatient hospital stay due to psychotic related symptoms and limited response to medications and ECT.        Medications: as above  Side effects from treatment: denied   Medication changes: as above  Medication education: Risks side effects benefits and precautions of medications discussed with patient and they did verbalize an understanding about risks for metabolic syndrome from being on neuroleptics and tardive dyskinesia etc.  All medications reviewed and I recommend that they be continued for symptom management  Understanding of medications: Risks side effects benefits and precautions of medications discussed with patient and they did verbalize an understanding about risks for metabolic syndrome from being on neuroleptics and tardive dyskinesia etc., and patient appeared to have understanding.  Justification for dual anti-psychotics: due to not responding to single antipsychotic.    Non-pharmacological treatments  Continue with individual, group, milieu and occupational therapy using recovery  "principles and psycho-education about accepting illness and the need for treatment.  Behavioral health checks every 7 minutes    Safety  Safety and communication plan established to target dynamic risk factors discussed above.    Discharge Plan   To his Aunt with [facility] for ACT when received     Interval Progress: Per treatment team Alberto has been cooperative with his care and treatment plan. There have been no events in the last 24 hours. He attended 8/10 groups on Friday. He has been complaint with his medications and there was no need for psychotropic PRNs yesterday. Per treatment team his appetite is at baseline and he has been sleeping well. He is reported to be social on the unit. He required PRN nicorette gum yesterday.     Today, Alberto reports his mood as \"stable\". He stated that he has a goal of continuing to work out today and go to groups. He endorses continual auditory hallucinations that are saying they are going to kill him. They are not commanding and he denies the hallucinations involving his family or anyone else. He reports a stable appetite and good sleep the night prior. He reports skipping breakfast however that is normal for him. He denied SI, HI, or thoughts of self harm. He denied any medication side effects.    Acceptance by patient: accepts inpatient treatment and medications  Hopefulness in recovery: to live with his aunt in the community  Involved in reintegration process: communicates with siblings and aunt  Trusting in relationship with psychiatrist: trusting  Sleep: good  Appetite: stable  Compliance with Medications: complaint  Group attendance: 8/10 groups  Significant events: none in the last 24 hours     Mental Status Exam  Appearance: age appropriate, casually dressed, adequate grooming, wearing hospital pants and a hoodie pulled over his head  Behavior: cooperative, calm, standing in his room, active in conversation  Speech: normal rate, normal volume, normal pitch, " "coherent, not pressured or hyperverbal  Mood: \"stable\"  Affect: constricted, mood-congruent  Thought Process: organized, logical, coherent, goal directed  Thought Content: mild paranoia, intrusive thoughts  Perceptual Disturbances: auditory hallucinations of voices that say they are going to kill him. Denies command hallucinations. Denies involvement of family and says hallucinations only involve him. Up until recently patient was reporting involvement of family in his auditory hallucinations. No visual hallucinations. Not observed responding to internal stimuli.  Risk Potential: Suicidal ideations none, homicidal ideations none, and potential for aggression not present at this time.   Sensorium: alert and oriented to person, place, time and situation  Cognition: recent and remote memory grossly intact  Consciousness: alert and awake  Attention: attention span and concentration are age appropriate  Intellect: appears to be of average intelligence  Insight: intact  Judgement: intact  Motor Activity: no abnormal movements     Vitals  Temp:  [97.6 °F (36.4 °C)] 97.6 °F (36.4 °C)  HR:  [80-94] 80  Resp:  [18] 18  BP: (111-127)/(68-73) 127/73  SpO2:  [98 %] 98 %  No intake or output data in the 24 hours ending 01/06/25 1025    Lab Results: All Labs For Current Hospital Admission Reviewed      Current Facility-Administered Medications   Medication Dose Route Frequency Provider Last Rate    acetaminophen  650 mg Oral Q4H PRN Jordan C Holter, DO      acetaminophen  650 mg Oral Q6H PRN HOLLI Lion      aluminum-magnesium hydroxide-simethicone  30 mL Oral Q4H PRN Jordan C Holter, DO      amLODIPine  5 mg Oral Daily HOLLI Lion      ARIPiprazole  30 mg Oral Daily Bora Rosario MD      Artificial Tears  1 drop Both Eyes Q3H PRN Jordan C Holter, DO      atropine  1 drop Sublingual HS Harjeet Torres,       haloperidol lactate  2.5 mg Intramuscular Q4H PRN Max 4/day HOLLI Lion      And    LORazepam  1 mg " Intramuscular Q4H PRN Max 4/day HOLLI Lion      And    benztropine  0.5 mg Intramuscular Q4H PRN Max 4/day HOLLI Lion      benztropine  1 mg Intramuscular Q4H PRN Max 6/day Ion FISCHER Holter, DO      haloperidol lactate  5 mg Intramuscular Q4H PRN Max 4/day HOLLI Lion      And    LORazepam  2 mg Intramuscular Q4H PRN Max 4/day HOLLI Lion      And    benztropine  1 mg Intramuscular Q4H PRN Max 4/day HOLLI Lion      benztropine  1 mg Oral Q4H PRN Max 6/day HOLLI Lion      benztropine  1 mg Oral Q4H PRN Max 6/day Ion FISCHER Holter, DO      bisacodyl  10 mg Rectal Daily PRN HOLLI Lion      calcium carbonate  500 mg Oral BID PRN HOLLI Monge      cloZAPine  250 mg Oral HS Bora Rosario MD      hydrOXYzine HCL  50 mg Oral Q6H PRN Max 4/day Jordan C Holter, DO      Or    diphenhydrAMINE  50 mg Intramuscular Q6H PRN Jordan C Holter, DO      hydrOXYzine HCL  50 mg Oral Q6H PRN Max 4/day HOLLI Lion      Or    diphenhydrAMINE  50 mg Intramuscular Q6H PRN HOLLI Lion      diphenhydrAMINE-zinc acetate   Topical BID PRN HOLLI Lion      docusate sodium  100 mg Oral BID Jourdan Dickens, DO      escitalopram  20 mg Oral Daily HOLLI Lion      famotidine  20 mg Oral BID PRN HOLLI Monge      fluticasone  1 spray Each Nare Daily PRN HOLLI Monge      glycopyrrolate  1 mg Oral BID (AM & Afternoon) Bora Rosario MD      glycopyrrolate  2 mg Oral HS Bora Rosario MD      haloperidol  1 mg Oral Q6H PRN HOLLI Lion      haloperidol  2.5 mg Oral Q4H PRN Max 4/day HOLLI Lion      haloperidol  5 mg Oral Q4H PRN Max 4/day HOLLI Lion      hydroCHLOROthiazide  12.5 mg Oral Daily Pia Mantilla, CRNP      hydrocortisone   Topical 4x Daily PRN HOLLI Lion      hydrOXYzine HCL  100 mg Oral Q6H PRN Max 4/day Jordan C Holter, DO      Or    LORazepam  2 mg Intramuscular Q6H PRN Ion  AALIYAH Holter, DO      hydrOXYzine HCL  100 mg Oral Q6H PRN Max 4/day HOLLI Lion      Or    LORazepam  2 mg Intramuscular Q6H PRN HOLLI Lion      hydrOXYzine HCL  25 mg Oral Q6H PRN Max 4/day Ion  Cresencioter, DO      ibuprofen  600 mg Oral Q8H PRN HOLLI Lion      influenza vaccine  0.5 mL Intramuscular Once Bora Rosario MD      loratadine  10 mg Oral Daily Brina Guillen MD      magnesium hydroxide  30 mL Oral Once Jourdan Dickens, DO      melatonin  3 mg Oral HS PRN Bora Rosario MD      melatonin  9 mg Oral HS Bora Rosario MD      methocarbamol  500 mg Oral Q6H PRN HOLLI Lion      multivitamin-minerals  1 tablet Oral Daily Eileen MiyaHOLLI Jimenez      nicotine polacrilex  2 mg Oral Q4H PRN Bora Rosario MD      OLANZapine  5 mg Oral Q4H PRN Max 3/day [facility] Holter, DO      Or    OLANZapine  2.5 mg Intramuscular Q4H PRN Max 3/day Ion AALIYAH Dupontter, DO      OLANZapine  5 mg Oral Q3H PRN Max 3/day Ion C Holter, DO      Or    OLANZapine  5 mg Intramuscular Q3H PRN Max 3/day [facility] Holter, DO      OLANZapine  2.5 mg Oral Q4H PRN Max 6/day [facility] Holter, DO      ondansetron  4 mg Oral Q6H PRN HOLLI Lion      pantoprazole  20 mg Oral [facility] Holter, DO      polyethylene glycol  17 g Oral Daily PRN HOLLI Ghotra      polyethylene glycol  17 g Oral Daily Jourdan Dickens, DO      propranolol  10 mg Oral Q12H KAYLIE HOLLI Lion      senna-docusate sodium  1 tablet Oral Daily PRN Jordan C Holter, DO      senna-docusate sodium  2 tablet Oral HS Jourdan Dickens, DO      traZODone  50 mg Oral HS PRN HOLLI Lion      white petrolatum-mineral oil   Topical TID PRN HOLLI Lion         Counseling / Coordination of Care: Total floor / unit time spent today 15 minutes. Greater than 50% of total time was spent with the patient and / or family counseling and / or somewhat receptive to supportive listening and teaching positive coping skills to deal with  symptom mangement.     Patient's Rights, confidentiality and exceptions to confidentiality, use of automated medical record, Behavioral Health Services staff access to medical record, and consent to treatment reviewed.    This note has been dictated and hence there may be problems with punctuation, spelling and formatting and if anyone has any concerns please address them to Dr. Rosario    Note written by Leidy WALLACE and supervised by Dr. Bora Rosario.     This note is not shared with patient due to potential for making patient's condition worse by knowing the content of the note.

## 2025-01-06 NOTE — PLAN OF CARE
Problem: Ineffective Coping  Goal: Identifies ineffective coping skills  Outcome: Progressing  Goal: Identifies healthy coping skills  Outcome: Progressing  Goal: Demonstrates healthy coping skills  Outcome: Progressing  Goal: Participates in unit activities  Description: Interventions:  - Provide therapeutic environment   - Provide required programming   - Redirect inappropriate behaviors   Outcome: Progressing   Alberto continues to work on his goals and is seen using his coping skills

## 2025-01-06 NOTE — ASSESSMENT & PLAN NOTE
Reviewed on 01/06/25  Follows with medical team  Continue senna-docusate sodium dose per medical team.

## 2025-01-06 NOTE — PROGRESS NOTES
01/06/25 0852   Team Meeting   Meeting Type Daily Rounds   Team Members Present   Team Members Present Physician;Nurse;;Other (Discipline and Name)   Physician Team Member Morgan Rosario   Nursing Team Member Kathleen   Social Work Team Member Jose J ORTEGA   Other (Discipline and Name) Jeremias MS   Patient/Family Present   Patient Present No   Patient's Family Present No     Groups Participation  5/10.   Discharge to home with ACT services, waiting on [facility]. He's compliant with medications. He's pleasant and cooperative

## 2025-01-06 NOTE — NURSING NOTE
Patient visible and cooperative this evening. Attended nursing group this evening and half of the groups they had today. Patient appears calm. Pleasant upon approach. Behaviors controlled and appropriate. Patient offered no complaints.

## 2025-01-06 NOTE — ASSESSMENT & PLAN NOTE
Reviewed on 01/06/25  Follows with medical  Continue famotidine 20 mg tablet BID per medical team   Continue glycopyrrolate 1 mg tablet BID AM and afternoon per medical team  Continue glycopyrrolate 2 mg tablet before bed per medical team  Continue pantoprazole EC 20 mg tablet every morning per medical team  This is a chronic condition, and is stable unless otherwise noted.

## 2025-01-06 NOTE — NURSING NOTE
Pt is calm and cooperative, visible on the unit, social with peers and staff. Pt refused breakfast and ate 100% for lunch and dinner. Pt reports depression, AH, denies SI/HI/VH. Pt is medication compliant, safety checks ongoing.

## 2025-01-06 NOTE — PLAN OF CARE
Problem: Alteration in Thoughts and Perception  Goal: Agree to be compliant with medication regime, as prescribed and report medication side effects  Description: Interventions:  - Offer appropriate PRN medication and supervise ingestion; conduct AIMS, as needed   Outcome: Progressing  Goal: Attend and participate in unit activities, including therapeutic, recreational, and educational groups  Description: Interventions:  -Encourage Visitation and family involvement in care  Outcome: Progressing  Goal: Complete daily ADLs, including personal hygiene independently, as able  Description: Interventions:  - Observe, teach, and assist patient with ADLS  - Monitor and promote a balance of rest/activity, with adequate nutrition and elimination   Outcome: Progressing     Problem: Ineffective Coping  Goal: Demonstrates healthy coping skills  Outcome: Progressing  Goal: Participates in unit activities  Description: Interventions:  - Provide therapeutic environment   - Provide required programming   - Redirect inappropriate behaviors   Outcome: Progressing     Problem: Depression  Goal: Verbalize thoughts and feelings  Description: Interventions:  - Assess and re-assess patient's level of risk   - Engage patient in 1:1 interactions, daily, for a minimum of 15 minutes   - Encourage patient to express feelings, fears, frustrations, hopes   Outcome: Progressing  Goal: Refrain from harming self  Description: Interventions:  - Monitor patient closely, per order   - Supervise medication ingestion, monitor effects and side effects   Outcome: Progressing     Problem: Anxiety  Goal: Anxiety is at manageable level  Description: Interventions:  - Assess and monitor patient's anxiety level.   - Monitor for signs and symptoms (heart palpitations, chest pain, shortness of breath, headaches, nausea, feeling jumpy, restlessness, irritable, apprehensive).   - Collaborate with interdisciplinary team and initiate plan and interventions as  ordered.  - [place] patient to unit/surroundings  - Explain treatment plan  - Encourage participation in care  - Encourage verbalization of concerns/fears  - Identify coping mechanisms  - Assist in developing anxiety-reducing skills  - Administer/offer alternative therapies  - Limit or eliminate stimulants  Outcome: Progressing     Problem: Alteration in Orientation  Goal: Interact with staff daily  Description: Interventions:  - Assess and re-assess patient's level of orientation  - Engage patient in 1 on 1 interactions, daily, for a minimum of 15 minutes   - Establish rapport/trust with patient   Outcome: Progressing     Problem: Alteration in Thoughts and Perception  Goal: Treatment Goal: Gain control of psychotic behaviors/thinking, reduce/eliminate presenting symptoms and demonstrate improved reality functioning upon discharge  Outcome: Not Progressing  Goal: Refrain from acting on delusional thinking/internal stimuli  Description: Interventions:  - Monitor patient closely, per order   - Utilize least restrictive measures   - Set reasonable limits, give positive feedback for acceptable   - Administer medications as ordered and monitor of potential side effects  Outcome: Not Progressing     Problem: Depression  Goal: Treatment Goal: Demonstrate behavioral control of depressive symptoms, verbalize feelings of improved mood/affect, and adopt new coping skills prior to discharge  Outcome: Not Progressing

## 2025-01-06 NOTE — NURSING NOTE
Alberto maintained on ongoing SAFE precaution without incident on this shift. Awake, alert,pleasant and cooperative.   Attended and participated in 3 out of 6 groups today. Continues to be compliant with medication regimen, Denies any pain or discomfort at site. Auditory Hallucination  persistently the same, he is using his coping skill.  Interacts well with his peers.  Behavior control.

## 2025-01-07 LAB
BASOPHILS # BLD AUTO: 0.05 THOUSANDS/ÂΜL (ref 0–0.1)
BASOPHILS NFR BLD AUTO: 1 % (ref 0–1)
EOSINOPHIL # BLD AUTO: 0.3 THOUSAND/ÂΜL (ref 0–0.61)
EOSINOPHIL NFR BLD AUTO: 3 % (ref 0–6)
ERYTHROCYTE [DISTWIDTH] IN BLOOD BY AUTOMATED COUNT: 14.1 % (ref 11.6–15.1)
HCT VFR BLD AUTO: 43 % (ref 36.5–49.3)
HGB BLD-MCNC: 12.8 G/DL (ref 12–17)
IMM GRANULOCYTES # BLD AUTO: 0.02 THOUSAND/UL (ref 0–0.2)
IMM GRANULOCYTES NFR BLD AUTO: 0 % (ref 0–2)
LYMPHOCYTES # BLD AUTO: 4.21 THOUSANDS/ÂΜL (ref 0.6–4.47)
LYMPHOCYTES NFR BLD AUTO: 44 % (ref 14–44)
MCH RBC QN AUTO: 23.3 PG (ref 26.8–34.3)
MCHC RBC AUTO-ENTMCNC: 29.8 G/DL (ref 31.4–37.4)
MCV RBC AUTO: 78 FL (ref 82–98)
MONOCYTES # BLD AUTO: 0.82 THOUSAND/ÂΜL (ref 0.17–1.22)
MONOCYTES NFR BLD AUTO: 9 % (ref 4–12)
NEUTROPHILS # BLD AUTO: 4.01 THOUSANDS/ÂΜL (ref 1.85–7.62)
NEUTS SEG NFR BLD AUTO: 43 % (ref 43–75)
NRBC BLD AUTO-RTO: 0 /100 WBCS
PLATELET # BLD AUTO: 232 THOUSANDS/UL (ref 149–390)
PMV BLD AUTO: 10.1 FL (ref 8.9–12.7)
RBC # BLD AUTO: 5.49 MILLION/UL (ref 3.88–5.62)
WBC # BLD AUTO: 9.41 THOUSAND/UL (ref 4.31–10.16)

## 2025-01-07 PROCEDURE — 99232 SBSQ HOSP IP/OBS MODERATE 35: CPT | Performed by: PSYCHIATRY & NEUROLOGY

## 2025-01-07 PROCEDURE — 85025 COMPLETE CBC W/AUTO DIFF WBC: CPT | Performed by: PSYCHIATRY & NEUROLOGY

## 2025-01-07 RX ADMIN — NICOTINE POLACRILEX 2 MG: 2 GUM, CHEWING ORAL at 08:43

## 2025-01-07 RX ADMIN — HYDROCHLOROTHIAZIDE 12.5 MG: 12.5 TABLET ORAL at 08:42

## 2025-01-07 RX ADMIN — GLYCOPYRROLATE 2 MG: 1 TABLET ORAL at 21:18

## 2025-01-07 RX ADMIN — LORATADINE 10 MG: 10 TABLET ORAL at 08:41

## 2025-01-07 RX ADMIN — ESCITALOPRAM OXALATE 20 MG: 10 TABLET, FILM COATED ORAL at 08:42

## 2025-01-07 RX ADMIN — MELATONIN TAB 3 MG 9 MG: 3 TAB at 21:18

## 2025-01-07 RX ADMIN — ARIPIPRAZOLE 30 MG: 15 TABLET ORAL at 08:42

## 2025-01-07 RX ADMIN — AMLODIPINE BESYLATE 5 MG: 5 TABLET ORAL at 08:41

## 2025-01-07 RX ADMIN — GLYCOPYRROLATE 1 MG: 1 TABLET ORAL at 13:10

## 2025-01-07 RX ADMIN — PROPRANOLOL HYDROCHLORIDE 10 MG: 10 TABLET ORAL at 21:17

## 2025-01-07 RX ADMIN — ATROPINE SULFATE 1 DROP: 10 SOLUTION/ DROPS OPHTHALMIC at 21:17

## 2025-01-07 RX ADMIN — SENNOSIDES AND DOCUSATE SODIUM 2 TABLET: 50; 8.6 TABLET ORAL at 21:18

## 2025-01-07 RX ADMIN — NICOTINE POLACRILEX 2 MG: 2 GUM, CHEWING ORAL at 13:09

## 2025-01-07 RX ADMIN — CLOZAPINE 250 MG: 25 TABLET ORAL at 21:18

## 2025-01-07 RX ADMIN — NICOTINE POLACRILEX 2 MG: 2 GUM, CHEWING ORAL at 17:20

## 2025-01-07 RX ADMIN — DOCUSATE SODIUM 100 MG: 100 CAPSULE, LIQUID FILLED ORAL at 17:20

## 2025-01-07 RX ADMIN — PROPRANOLOL HYDROCHLORIDE 10 MG: 10 TABLET ORAL at 08:41

## 2025-01-07 RX ADMIN — DOCUSATE SODIUM 100 MG: 100 CAPSULE, LIQUID FILLED ORAL at 08:42

## 2025-01-07 RX ADMIN — GLYCOPYRROLATE 1 MG: 1 TABLET ORAL at 08:41

## 2025-01-07 RX ADMIN — NICOTINE POLACRILEX 2 MG: 2 GUM, CHEWING ORAL at 21:19

## 2025-01-07 RX ADMIN — MULTIPLE VITAMINS W/ MINERALS TAB 1 TABLET: TAB ORAL at 08:42

## 2025-01-07 RX ADMIN — PANTOPRAZOLE SODIUM 20 MG: 20 TABLET, DELAYED RELEASE ORAL at 08:41

## 2025-01-07 NOTE — ASSESSMENT & PLAN NOTE
Reviewed on 01/07/25  Follows with medical  Continue famotidine 20 mg tablet BID per medical team   Continue glycopyrrolate 1 mg tablet BID AM and afternoon per medical team  Continue glycopyrrolate 2 mg tablet before bed per medical team  Continue pantoprazole EC 20 mg tablet every morning per medical team  This is a chronic condition, and is stable unless otherwise noted.

## 2025-01-07 NOTE — PROGRESS NOTES
01/07/25 0759   Team Meeting   Meeting Type Daily Rounds   Team Members Present   Team Members Present Physician;Nurse;;Other (Discipline and Name)   Physician Team Member Thomas,Holter   Nursing Team Member Claudia   Social Work Team Member Jose J ORTEGA   Patient/Family Present   Patient Present No   Patient's Family Present No     Groups Participation  7/9   Patient's compliant with medications. He's engaged in his treatment. He's appropriate and compliant. Waiting for [facility] for ACT services to discharge home. Patient interacts with select peers.

## 2025-01-07 NOTE — NURSING NOTE
Alberto maintain on ongoing SAFE precaution without incident on this shift. Observed resting in bed, respiration even and unlabored. Continuous Q 15 minutes check implemented thru the night.  Schedule lab: CBC to be obtain in the morning. No behavioral noted

## 2025-01-07 NOTE — ASSESSMENT & PLAN NOTE
Reviewed on 01/07/25  Follows with medical team  Continue senna-docusate sodium dose per medical team.

## 2025-01-07 NOTE — ASSESSMENT & PLAN NOTE
Reviewed on 1/07/25  Follows with medical team and given dietary counseling and need for exercise

## 2025-01-07 NOTE — PROGRESS NOTES
01/06/25 0910   Team Meeting   Meeting Type Tx Team Meeting   Initial Conference Date 01/07/25   Next Conference Date 01/21/25   Team Members Present   Team Members Present Physician;Nurse;;Other (Discipline and Name)   Physician Team Member Morgan Rosario   Nursing Team Member Shai   Social Work Team Member Nikkie   Other (Discipline and Name) Heidy CMP, Gaby CMP   Patient/Family Present   Patient Present Yes   Patient's Family Present No   OTHER   Team Meeting - Additional Comments Patient attened his treatment team meeting. He discussed continued psych symptoms, which are his base line. He discussed using his coping skills to manage his sy,mptoms. He conifrme dhis Aunt received his PA ID camer card, whcib this writer will take patient to [facility] to obtain a new license. Currently we are awaiting [facility] funding. [facility] reports earliest wouyld be Februaury. This writer and patient's sister will re-apply for his MA benefits.

## 2025-01-07 NOTE — PLAN OF CARE
Problem: Alteration in Thoughts and Perception  Goal: Treatment Goal: Gain control of psychotic behaviors/thinking, reduce/eliminate presenting symptoms and demonstrate improved reality functioning upon discharge  Outcome: Progressing  Goal: Refrain from acting on delusional thinking/internal stimuli  Description: Interventions:  - Monitor patient closely, per order   - Utilize least restrictive measures   - Set reasonable limits, give positive feedback for acceptable   - Administer medications as ordered and monitor of potential side effects  Outcome: Progressing  Goal: Agree to be compliant with medication regime, as prescribed and report medication side effects  Description: Interventions:  - Offer appropriate PRN medication and supervise ingestion; conduct AIMS, as needed   Outcome: Progressing     Problem: Ineffective Coping  Goal: Demonstrates healthy coping skills  Outcome: Progressing  Goal: Participates in unit activities  Description: Interventions:  - Provide therapeutic environment   - Provide required programming   - Redirect inappropriate behaviors   Outcome: Progressing     Problem: Depression  Goal: Verbalize thoughts and feelings  Description: Interventions:  - Assess and re-assess patient's level of risk   - Engage patient in 1:1 interactions, daily, for a minimum of 15 minutes   - Encourage patient to express feelings, fears, frustrations, hopes   Outcome: Progressing  Goal: Refrain from isolation  Description: Interventions:  - Develop a trusting relationship   - Encourage socialization   Outcome: Progressing  Goal: Refrain from self-neglect  Outcome: Progressing     Problem: Alteration in Orientation  Goal: Complete daily ADLs, including personal hygiene independently, as able  Description: Interventions:  - Observe, teach, and assist patient with ADLS  Outcome: Progressing

## 2025-01-07 NOTE — NURSING NOTE
Alberto maintained on ongoing SAFE precaution without incident on this shift. Awake, alert,pleasant and cooperative.   Attended and participated in 7 out of 9 groups today. Continues to be compliant with medication regimen, Denies any pain or discomfort at site. Auditory Hallucination  persistently the same, he is using his coping skill.  Interacts well with his peers.  Behavior control.

## 2025-01-07 NOTE — PROGRESS NOTES
01/06/25 0915   Team Meeting   Initial Conference Date 01/07/25   Next Conference Date 02/05/25   Team Members Present   Team Members Present Physician;Nurse;   Physician Team Member Morgan Rosario   Nursing Team Member Shai   Social Work Team Member Jose J ORTEGA   Patient/Family Present   Patient Present Yes   Patient's Family Present No     Treatment plan review with patient. Patient in agreement and signed his updated treatment plan

## 2025-01-07 NOTE — SOCIAL WORK
This writer spoke with [place] and obtained information to complete patient's MA application. This writer completed application on [employer] and submitted for review. This writer emailed [facility] and requested they coordinator with the DHS liaison to expedite his application.   This writer spoke left voice message for patient's Aunt Mala to schedule a time to bring in his camera card to then obtain his PA ID.

## 2025-01-07 NOTE — PROGRESS NOTES
Progress Note - Behavioral Health   Name: Alberto Berumen 29 y.o. male I MRN: [id number]  Unit/Bed#: [facility] 112-02 I Date of Admission: 3/29/2024   Date of Service: 1/7/2025 I Hospital Day: 284     Assessment & Plan  Schizoaffective disorder, bipolar type (HCC)  Reviewed on 01/07/25  No significant changes still with same threatening voices as before not commanding and planning to have a virtual visit with his sisters  Continue medication as follows:  Continue Clozapine 250 mg QHS for psychosis - increased 12/11/24  To consider further careful titration  CBC w/diff and CK every 2 weeks  CK was increased to 960 on 12/12 and oral hydration encouraged, CK improved to 905 on 12/13/24. Repeat on 12/17/24 decreased to 674 and on 12/20/24 decreased to 448  Labs  drawn on 12/24/2024, repeat  trending down  Labs drawn 01/07, awaiting result of CK. ANC 4.01  Continue Abilify 30 mg daily for psychosis as he is resistant to single antipsychotic  Continue Lexapro 20 mg daily for depression and anxiety  Continue Robinul 1 mg BID and 2 mg QHS for sialorrhea from Clozapine  Continue Atropine 1% solution 1 drop sublingually QHS for adverse effects of Clozapine  Continue Propanolol 10 mg BID for anxiety   Continue Melatonin 9 mg QHS for sleep  Continue Senna-Docusate sodium one 50 mg tablet QHS for constipation    Pt remains on dual antipsychotic Tx due to failure on monotherapy   .  S/p ECT treatments.  - ACT team referral was made for him living back with his aunt once MA funding comes through from the [facility] and sister will try to reapply    Chronic idiopathic constipation  Reviewed on 01/07/25  Follows with medical team  Continue senna-docusate sodium dose per medical team.     GERD (gastroesophageal reflux disease)  Reviewed on 01/07/25  Follows with medical  Continue famotidine 20 mg tablet BID per medical team   Continue glycopyrrolate 1 mg tablet BID AM and afternoon per medical team  Continue  glycopyrrolate 2 mg tablet before bed per medical team  Continue pantoprazole EC 20 mg tablet every morning per medical team  This is a chronic condition, and is stable unless otherwise noted.    Tobacco abuse   Reviewed on 1/07/25  on nicotine gum as needed and encouraged smoking cessation  Continue to encourage cessation upon discharge    Elevated hemoglobin A1c  Reviewed on 1/07/25  Follows with medical team    Class 2 obesity in adult  Reviewed on 1/07/25  Follows with medical team and given dietary counseling and need for exercise    Primary hypertension  Reviewed on 1/07/25  Follows with medical team on hydrochlorothiazide and Norvasc        Psychiatry Progress Note [facility]    Progress towards goals: moderate progress    Review of systems: denied    Psychiatric Diagnosis: Schizoaffective disorder, bipolar type     Assessment  Overall Status:  progressing, no significant changes  Certification Statement: The patient will continue to require additional inpatient hospital stay due to psychotic related symptoms and limited response to medications and ECT        Medications: as above  Side effects from treatment: reports increased fatigue  Medication changes: as above  Medication education: Risks side effects benefits and precautions of medications discussed with patient and they did verbalize an understanding about risks for metabolic syndrome from being on neuroleptics and tardive dyskinesia etc.  All medications reviewed and I recommend that they be continued for symptom management  Understanding of medications: Risks side effects benefits and precautions of medications discussed with patient and they did verbalize an understanding about risks for metabolic syndrome from being on neuroleptics and tardive dyskinesia etc., and patient appeared to have understanding.  Justification for dual anti-psychotics: due to not responding to single antipsychotic.    Non-pharmacological  "treatments  Continue with individual, group, milieu and occupational therapy using recovery principles and psycho-education about accepting illness and the need for treatment.  Behavioral health checks every 7 minutes    Safety  Safety and communication plan established to target dynamic risk factors discussed above.    Discharge Plan   To his Aunt with [facility] for ACT when received    Interval Progress: Per treatment team, Alberto has been cooperative with his care and treatment plan. There have been no events in the last 24 hours. He attended 7/9 groups yesterday. He has been compliant with his medications and there was no need for psychotropic PRNs yesterday. He is reported to be journaling more often. He had labs drawn this morning for which the results are pending. Per treatment team, there is another patient on the unit who has been bothering Alberto and he has been appropriately avoiding them and not engaging.    Today, Alberto reports his mood as \"stable\". He stated that he has a goal of continuing to work out today. He endorses continual auditory hallucinations that are saying they are going to kill him. They are not commanding and he denies the hallucinations involving his family or anyone else. He reports a stable appetite. Today, he endorsed increased fatigue which he attributed to his medications. He denied SI, HI, or thoughts of self harm.     Acceptance by patient: accepts inpatient treatment and medications  Hopefulness in recovery: to live with his aunt in the community  Involved in reintegration process: communicates with siblings and aunt   Trusting in relationship with psychiatrist: trusting  Sleep: good  Appetite: good  Compliance with Medications: complaint  Group attendance: 7/9 groups  Significant events: none in the last 24 hours     Mental Status Exam  Appearance:  age appropriate, casually dressed, laying in bed with a blanket over his face, adequate grooming  Behavior: cooperative, " "calm, removed blanket from face during conversation, fatigued and falling asleep during conversation  Speech: normal rate, normal volume, normal pitch, coherent, clear, not pressured or hyperverbal  Mood:  \"stable\"  Affect: constricted, mood-congruent  Thought Process: organized, logical, coherent, goal directed, linear  Thought Content:  mild paranoia, intrusive thoughts  Perceptual Disturbances: auditory hallucinations of voices that say they are going to kill him. Denies command hallucinations. Denies involvement of family and says hallucinations only involve him. Up until recently patient was reporting involvement of family in his auditory hallucinations. No visual hallucinations. Not observed responding to internal stimuli or appearing preoccupied.  Risk Potential:  Suicidal ideations none, homicidal ideations none, and potential for aggression not present at this time  Sensorium: alert and oriented to person, place, time and situation  Cognition: recent and remote memory grossly intact  Consciousness: alert and awake  Attention: attention span and concentration are age appropriate  Intellect: appears to be of average intelligence  Insight: intact  Judgement: intact  Motor Activity: no abnormal movements     Vitals  Temp:  [97.6 °F (36.4 °C)-97.7 °F (36.5 °C)] 97.7 °F (36.5 °C)  HR:  [73-97] 73  Resp:  [17-18] 17  BP: (128-139)/(60-80) 139/60  SpO2:  [99 %] 99 %  No intake or output data in the 24 hours ending 01/07/25 0938    Lab Results: All Labs For Current Hospital Admission Reviewed  Results from last 7 days   Lab Units 01/07/25  0729   WBC Thousand/uL 9.41   RBC Million/uL 5.49   HEMOGLOBIN g/dL 12.8   HEMATOCRIT % 43.0   MCV fL 78*   PLATELETS Thousands/uL 232   TOTAL NEUT ABS Thousands/µL 4.01       Current Facility-Administered Medications   Medication Dose Route Frequency Provider Last Rate    acetaminophen  650 mg Oral Q4H PRN Jordan C Holter,       acetaminophen  650 mg Oral Q6H PRN Eveline Hunt, " CRNP      aluminum-magnesium hydroxide-simethicone  30 mL Oral Q4H PRN Jordan C Holter, DO      amLODIPine  5 mg Oral Daily HOLLI Lion      ARIPiprazole  30 mg Oral Daily Bora Rosario MD      Artificial Tears  1 drop Both Eyes Q3H PRN Jordan C Holter, DO      atropine  1 drop Sublingual  Harjeet Torres, DO      haloperidol lactate  2.5 mg Intramuscular Q4H PRN Max 4/day STEVE LionNP      And    LORazepam  1 mg Intramuscular Q4H PRN Max 4/day STEVE LionNP      And    benztropine  0.5 mg Intramuscular Q4H PRN Max 4/day STEVE LionNP      benztropine  1 mg Intramuscular Q4H PRN Max 6/day Jordan C Holter, DO      haloperidol lactate  5 mg Intramuscular Q4H PRN Max 4/day HOLLI Lion      And    LORazepam  2 mg Intramuscular Q4H PRN Max 4/day HOLLI Lion      And    benztropine  1 mg Intramuscular Q4H PRN Max 4/day HOLLI Lion      benztropine  1 mg Oral Q4H PRN Max 6/day HOLLI Lion      benztropine  1 mg Oral Q4H PRN Max 6/day Jordan C Holter, DO      bisacodyl  10 mg Rectal Daily PRN HOLLI Lion      calcium carbonate  500 mg Oral BID PRN HOLLI Monge      cloZAPine  250 mg Oral HS Bora Rosario MD      hydrOXYzine HCL  50 mg Oral Q6H PRN Max 4/day Jordan C Holter,       Or    diphenhydrAMINE  50 mg Intramuscular Q6H PRN Jordan C Holter, DO      hydrOXYzine HCL  50 mg Oral Q6H PRN Max 4/day HOLLI Lion      Or    diphenhydrAMINE  50 mg Intramuscular Q6H PRN HOLLI Lion      diphenhydrAMINE-zinc acetate   Topical BID PRN HOLLI Lion      docusate sodium  100 mg Oral BID Jourdan Dickens, DO      escitalopram  20 mg Oral Daily HOLLI Lion      famotidine  20 mg Oral BID PRN HOLLI Monge      fluticasone  1 spray Each Nare Daily PRN Eileen Miya Kormandy, CRNP      glycopyrrolate  1 mg Oral BID (AM & Afternoon) Bora oRsario MD      glycopyrrolate  2 mg Oral HS Bora Rosario MD      haloperidol   1 mg Oral Q6H PRN HOLLI Lion      haloperidol  2.5 mg Oral Q4H PRN Max 4/day HOLLI Lion      haloperidol  5 mg Oral Q4H PRN Max 4/day HOLLI Lion      hydroCHLOROthiazide  12.5 mg Oral Daily HOLLI Galvan      hydrocortisone   Topical 4x Daily PRN HOLLI Lino      hydrOXYzine HCL  100 mg Oral Q6H PRN Max 4/day [facility] Cresencioter, DO      Or    LORazepam  2 mg Intramuscular Q6H PRN [facility] Holter, DO      hydrOXYzine HCL  100 mg Oral Q6H PRN Max 4/day HOLLI Lion      Or    LORazepam  2 mg Intramuscular Q6H PRN HOLLI Lion      hydrOXYzine HCL  25 mg Oral Q6H PRN Max 4/day [facility] Holter, DO      ibuprofen  600 mg Oral Q8H PRN HOLLI Lion      influenza vaccine  0.5 mL Intramuscular Once Bora Rosario MD      loratadine  10 mg Oral Daily Brina Guillen MD      magnesium hydroxide  30 mL Oral Once Jourdan Dickens, DO      melatonin  3 mg Oral HS PRN Bora Rosario MD      melatonin  9 mg Oral HS Bora Rosario MD      methocarbamol  500 mg Oral Q6H PRN HOLLI Lion      multivitamin-minerals  1 tablet Oral Daily HOLLI Monge      nicotine polacrilex  2 mg Oral Q4H PRN Bora Rosario MD      OLANZapine  5 mg Oral Q4H PRN Max 3/day [facility] Holter, DO      Or    OLANZapine  2.5 mg Intramuscular Q4H PRN Max 3/day [facility] Holter, DO      OLANZapine  5 mg Oral Q3H PRN Max 3/day [facility] Holter, DO      Or    OLANZapine  5 mg Intramuscular Q3H PRN Max 3/day [facility] Holter, DO      OLANZapine  2.5 mg Oral Q4H PRN Max 6/day [facility] Holter, DO      ondansetron  4 mg Oral Q6H PRN HOLLI Lion      pantoprazole  20 mg Oral QAM [facility] Holter, DO      polyethylene glycol  17 g Oral Daily PRN HOLLI Ghotra      polyethylene glycol  17 g Oral Daily Jourdan Dickens, DO      propranolol  10 mg Oral Q12H KAYLIE HOLLI Lion      senna-docusate sodium  1 tablet Oral Daily PRN Jordan C Holter, DO senna-docusate sodium  2 tablet  Oral HS Jourdan Dickens DO      traZODone  50 mg Oral HS PRN HOLLI Lion      white petrolatum-mineral oil   Topical TID PRN HOLLI Lion         Counseling / Coordination of Care: Total floor / unit time spent today 15 minutes. Greater than 50% of total time was spent with the patient and / or family counseling and / or somewhat receptive to supportive listening and teaching positive coping skills to deal with symptom mangement.     Patient's Rights, confidentiality and exceptions to confidentiality, use of automated medical record, Behavioral Health Services staff access to medical record, and consent to treatment reviewed.    This note has been dictated and hence there may be problems with punctuation, spelling and formatting and if anyone has any concerns please address them to Dr. Rosario  This note is not shared with patient due to potential for making patient's condition worse by knowing the content of the note.    Note written by Leidy WALLACE and supervised by Dr. Rosario.

## 2025-01-07 NOTE — NURSING NOTE
Patient has been visible on the unit all shift. Pleasant and social with select peers. Cooperative and pleasant. Medication compliant. Continues to admit having auditory hallucinations stating they are going to harm him and his family. Verbal support given. Denies suicidal ideations. Attends some groups. Social with select peers.

## 2025-01-07 NOTE — ASSESSMENT & PLAN NOTE
Reviewed on 1/07/25  on nicotine gum as needed and encouraged smoking cessation  Continue to encourage cessation upon discharge

## 2025-01-07 NOTE — ASSESSMENT & PLAN NOTE
Reviewed on 01/07/25  No significant changes still with same threatening voices as before not commanding and planning to have a virtual visit with his sisters  Continue medication as follows:  Continue Clozapine 250 mg QHS for psychosis - increased 12/11/24  To consider further careful titration  CBC w/diff and CK every 2 weeks  CK was increased to 960 on 12/12 and oral hydration encouraged, CK improved to 905 on 12/13/24. Repeat on 12/17/24 decreased to 674 and on 12/20/24 decreased to 448  Labs  drawn on 12/24/2024, repeat  trending down  Labs drawn 01/07, awaiting result of CK. ANC 4.01  Continue Abilify 30 mg daily for psychosis as he is resistant to single antipsychotic  Continue Lexapro 20 mg daily for depression and anxiety  Continue Robinul 1 mg BID and 2 mg QHS for sialorrhea from Clozapine  Continue Atropine 1% solution 1 drop sublingually QHS for adverse effects of Clozapine  Continue Propanolol 10 mg BID for anxiety   Continue Melatonin 9 mg QHS for sleep  Continue Senna-Docusate sodium one 50 mg tablet QHS for constipation    Pt remains on dual antipsychotic Tx due to failure on monotherapy   .  S/p ECT treatments.  - ACT team referral was made for him living back with his aunt once MA funding comes through from the [facility] and sister will try to reapply

## 2025-01-08 LAB — CK SERPL-CCNC: 387 U/L (ref 39–308)

## 2025-01-08 PROCEDURE — 82550 ASSAY OF CK (CPK): CPT | Performed by: PSYCHIATRY & NEUROLOGY

## 2025-01-08 PROCEDURE — 99232 SBSQ HOSP IP/OBS MODERATE 35: CPT | Performed by: PSYCHIATRY & NEUROLOGY

## 2025-01-08 RX ADMIN — MULTIPLE VITAMINS W/ MINERALS TAB 1 TABLET: TAB ORAL at 09:14

## 2025-01-08 RX ADMIN — ARIPIPRAZOLE 30 MG: 15 TABLET ORAL at 09:14

## 2025-01-08 RX ADMIN — GLYCOPYRROLATE 2 MG: 1 TABLET ORAL at 21:13

## 2025-01-08 RX ADMIN — CLOZAPINE 250 MG: 25 TABLET ORAL at 21:13

## 2025-01-08 RX ADMIN — LORATADINE 10 MG: 10 TABLET ORAL at 09:13

## 2025-01-08 RX ADMIN — ESCITALOPRAM OXALATE 20 MG: 10 TABLET, FILM COATED ORAL at 09:13

## 2025-01-08 RX ADMIN — HYDROCHLOROTHIAZIDE 12.5 MG: 12.5 TABLET ORAL at 09:14

## 2025-01-08 RX ADMIN — NICOTINE POLACRILEX 2 MG: 2 GUM, CHEWING ORAL at 17:07

## 2025-01-08 RX ADMIN — SENNOSIDES AND DOCUSATE SODIUM 2 TABLET: 50; 8.6 TABLET ORAL at 21:13

## 2025-01-08 RX ADMIN — MELATONIN TAB 3 MG 9 MG: 3 TAB at 21:13

## 2025-01-08 RX ADMIN — AMLODIPINE BESYLATE 5 MG: 5 TABLET ORAL at 09:14

## 2025-01-08 RX ADMIN — NICOTINE POLACRILEX 2 MG: 2 GUM, CHEWING ORAL at 09:13

## 2025-01-08 RX ADMIN — NICOTINE POLACRILEX 2 MG: 2 GUM, CHEWING ORAL at 13:00

## 2025-01-08 RX ADMIN — PANTOPRAZOLE SODIUM 20 MG: 20 TABLET, DELAYED RELEASE ORAL at 09:14

## 2025-01-08 RX ADMIN — GLYCOPYRROLATE 1 MG: 1 TABLET ORAL at 09:14

## 2025-01-08 RX ADMIN — DOCUSATE SODIUM 100 MG: 100 CAPSULE, LIQUID FILLED ORAL at 17:07

## 2025-01-08 RX ADMIN — DOCUSATE SODIUM 100 MG: 100 CAPSULE, LIQUID FILLED ORAL at 09:14

## 2025-01-08 RX ADMIN — PROPRANOLOL HYDROCHLORIDE 10 MG: 10 TABLET ORAL at 09:13

## 2025-01-08 RX ADMIN — PROPRANOLOL HYDROCHLORIDE 10 MG: 10 TABLET ORAL at 21:14

## 2025-01-08 RX ADMIN — GLYCOPYRROLATE 1 MG: 1 TABLET ORAL at 12:59

## 2025-01-08 RX ADMIN — NICOTINE POLACRILEX 2 MG: 2 GUM, CHEWING ORAL at 21:13

## 2025-01-08 RX ADMIN — ATROPINE SULFATE 1 DROP: 10 SOLUTION/ DROPS OPHTHALMIC at 21:12

## 2025-01-08 NOTE — ASSESSMENT & PLAN NOTE
Reviewed on 1/08/25  Follows with medical team and given dietary counseling and need for exercise

## 2025-01-08 NOTE — PROGRESS NOTES
01/08/25 0829   Team Meeting   Meeting Type Daily Rounds   Team Members Present   Team Members Present Physician;Nurse;;Other (Discipline and Name)   Physician Team Member Thomas,Holter   Nursing Team Member Claudia   Social Work Team Member Jose J ORTEGA   Other (Discipline and Name) Wang PCM   Patient/Family Present   Patient Present No   Patient's Family Present No     Groups Participation  8/10.   Patient's compliant with medications. Completed his application for MA benefits. He's pleasant and cooperative. He's engaged in his treatment. Labs completed waiting results. Socialize with select peers.

## 2025-01-08 NOTE — SOCIAL WORK
This writer spoke with patient's Aunt Cyndie regarding an in person visit with patient which she will bring in his camera card to obtain his ID.Aunt will visit on 1/17@1200.

## 2025-01-08 NOTE — PLAN OF CARE
Problem: Alteration in Thoughts and Perception  Goal: Refrain from acting on delusional thinking/internal stimuli  Description: Interventions:  - Monitor patient closely, per order   - Utilize least restrictive measures   - Set reasonable limits, give positive feedback for acceptable   - Administer medications as ordered and monitor of potential side effects  Outcome: Progressing  Goal: Agree to be compliant with medication regime, as prescribed and report medication side effects  Description: Interventions:  - Offer appropriate PRN medication and supervise ingestion; conduct AIMS, as needed   Outcome: Progressing  Goal: Recognize dysfunctional thoughts, communicate reality-based thoughts at the time of discharge  Description: Interventions:  - Provide medication and psycho-education to assist patient in compliance and developing insight into his/her illness   Outcome: Progressing     Problem: Ineffective Coping  Goal: Participates in unit activities  Description: Interventions:  - Provide therapeutic environment   - Provide required programming   - Redirect inappropriate behaviors   Outcome: Progressing  Goal: Patient/Family participate in treatment and DC plans  Description: Interventions:  - Provide therapeutic environment  Outcome: Progressing  Goal: Patient/Family verbalizes awareness of resources  Outcome: Progressing     Problem: Depression  Goal: Treatment Goal: Demonstrate behavioral control of depressive symptoms, verbalize feelings of improved mood/affect, and adopt new coping skills prior to discharge  Outcome: Progressing  Goal: Verbalize thoughts and feelings  Description: Interventions:  - Assess and re-assess patient's level of risk   - Engage patient in 1:1 interactions, daily, for a minimum of 15 minutes   - Encourage patient to express feelings, fears, frustrations, hopes   Outcome: Progressing  Goal: Refrain from harming self  Description: Interventions:  - Monitor patient closely, per order    - Supervise medication ingestion, monitor effects and side effects   Outcome: Progressing  Goal: Refrain from isolation  Description: Interventions:  - Develop a trusting relationship   - Encourage socialization   Outcome: Progressing  Goal: Refrain from self-neglect  Outcome: Progressing     Problem: Anxiety  Goal: Anxiety is at manageable level  Description: Interventions:  - Assess and monitor patient's anxiety level.   - Monitor for signs and symptoms (heart palpitations, chest pain, shortness of breath, headaches, nausea, feeling jumpy, restlessness, irritable, apprehensive).   - Collaborate with interdisciplinary team and initiate plan and interventions as ordered.  - [place] patient to unit/surroundings  - Explain treatment plan  - Encourage participation in care  - Encourage verbalization of concerns/fears  - Identify coping mechanisms  - Assist in developing anxiety-reducing skills  - Administer/offer alternative therapies  - Limit or eliminate stimulants  Outcome: Progressing     Problem: Alteration in Orientation  Goal: Treatment Goal: Demonstrate a reduction of confusion and improved orientation to person, place, time and/or situation upon discharge, according to optimum baseline/ability  Outcome: Progressing  Goal: Interact with staff daily  Description: Interventions:  - Assess and re-assess patient's level of orientation  - Engage patient in 1 on 1 interactions, daily, for a minimum of 15 minutes   - Establish rapport/trust with patient   Outcome: Progressing  Goal: Allow medical examinations, as recommended  Description: Interventions:  - Provide physical/neurological exams and/or referrals, per provider   Outcome: Progressing  Goal: Cooperate with recommended testing/procedures  Description: Interventions:  - Determine need for ancillary testing  - Observe for mental status changes  - Implement falls/precaution protocol   Outcome: Progressing  Goal: Complete daily ADLs, including personal hygiene  independently, as able  Description: Interventions:  - Observe, teach, and assist patient with ADLS  Outcome: Progressing     Problem: DISCHARGE PLANNING  Goal: Discharge to home with appropriate resources  Description: INTERVENTIONS:  - Identify barriers to discharge w/patient and caregiver  - Arrange for needed discharge resources and transportation as appropriate  - Identify discharge learning needs (meds, wound care, etc.)  - Refer to Case Management Department for coordinating discharge planning if the patient needs post-hospital services based on physician/advanced practitioner order or complex needs related to functional status, cognitive ability, or social support system  Outcome: Progressing

## 2025-01-08 NOTE — ASSESSMENT & PLAN NOTE
Reviewed on 01/08/25  No significant changes still with same threatening voices as before not commanding and planning to have a virtual visit with his sisters  Continue medication as follows:  Continue Clozapine 250 mg QHS for psychosis - increased 12/11/24  To consider further careful titration  CBC w/diff and CK every 2 weeks  CK was increased to 960 on 12/12 and oral hydration encouraged, CK improved to 905 on 12/13/24. Repeat on 12/17/24 decreased to 674 and on 12/20/24 decreased to 448  Labs  drawn on 12/24/2024, repeat  trending down  Labs drawn 01/07, ANC 4.01  Labs drawn 01/08,   Continue Abilify 30 mg daily for psychosis as he is resistant to single antipsychotic  Continue Lexapro 20 mg daily for depression and anxiety  Continue Robinul 1 mg BID and 2 mg QHS for sialorrhea from Clozapine  Continue Atropine 1% solution 1 drop sublingually QHS for adverse effects of Clozapine  Continue Propanolol 10 mg BID for anxiety   Continue Melatonin 9 mg QHS for sleep  Continue Senna-Docusate sodium one 50 mg tablet QHS for constipation    Pt remains on dual antipsychotic Tx due to failure on monotherapy   .  S/p ECT treatments.  - ACT team referral was made for him living back with his aunt once MA funding comes through from the [facility] and sister will try to reapply

## 2025-01-08 NOTE — PROGRESS NOTES
Progress Note - Behavioral Health   Name: Alberto Berumen 29 y.o. male I MRN: [id number]  Unit/Bed#: [facility] 112-02 I Date of Admission: 3/29/2024   Date of Service: 1/8/2025 I Hospital Day: 285     Assessment & Plan  Schizoaffective disorder, bipolar type (HCC)  Reviewed on 01/08/25  No significant changes still with same threatening voices as before not commanding and planning to have a virtual visit with his sisters  Continue medication as follows:  Continue Clozapine 250 mg QHS for psychosis - increased 12/11/24  To consider further careful titration  CBC w/diff and CK every 2 weeks  CK was increased to 960 on 12/12 and oral hydration encouraged, CK improved to 905 on 12/13/24. Repeat on 12/17/24 decreased to 674 and on 12/20/24 decreased to 448  Labs  drawn on 12/24/2024, repeat  trending down  Labs drawn 01/07, ANC 4.01  Labs drawn 01/08,   Continue Abilify 30 mg daily for psychosis as he is resistant to single antipsychotic  Continue Lexapro 20 mg daily for depression and anxiety  Continue Robinul 1 mg BID and 2 mg QHS for sialorrhea from Clozapine  Continue Atropine 1% solution 1 drop sublingually QHS for adverse effects of Clozapine  Continue Propanolol 10 mg BID for anxiety   Continue Melatonin 9 mg QHS for sleep  Continue Senna-Docusate sodium one 50 mg tablet QHS for constipation    Pt remains on dual antipsychotic Tx due to failure on monotherapy   .  S/p ECT treatments.  - ACT team referral was made for him living back with his aunt once MA funding comes through from the [facility] and sister will try to reapply    Chronic idiopathic constipation  Reviewed on 01/08/25  Follows with medical team  Continue senna-docusate sodium dose per medical team.     GERD (gastroesophageal reflux disease)  Reviewed on 01/08/25  Follows with medical  Continue famotidine 20 mg tablet BID per medical team   Continue glycopyrrolate 1 mg tablet BID AM and afternoon per medical team  Continue  glycopyrrolate 2 mg tablet before bed per medical team  Continue pantoprazole EC 20 mg tablet every morning per medical team  This is a chronic condition, and is stable unless otherwise noted.    Tobacco abuse   Reviewed on 1/08/25  on nicotine gum as needed and encouraged smoking cessation  Continue to encourage cessation upon discharge    Elevated hemoglobin A1c  Reviewed on 1/08/25  Follows with medical team    Class 2 obesity in adult  Reviewed on 1/08/25  Follows with medical team and given dietary counseling and need for exercise    Primary hypertension  Reviewed on 1/08/25  Follows with medical team on hydrochlorothiazide and Norvasc        Psychiatry Progress Note [facility]    Progress towards goals: moderate progress    Review of systems: denied    Psychiatric Diagnosis: Schizoaffective disorder, bipolar type     Assessment  Overall Status:  progressing, no significant changes  Certification Statement: The patient will continue to require additional inpatient hospital stay due to psychotic related symptoms and limited response to medications and ECT         Medications: as above  Side effects from treatment: denied  Medication changes: as above  Medication education: Risks side effects benefits and precautions of medications discussed with patient and they did verbalize an understanding about risks for metabolic syndrome from being on neuroleptics and tardive dyskinesia etc.  All medications reviewed and I recommend that they be continued for symptom management  Understanding of medications: Risks side effects benefits and precautions of medications discussed with patient and they did verbalize an understanding about risks for metabolic syndrome from being on neuroleptics and tardive dyskinesia etc., and patient appeared to have understanding.  Justification for dual anti-psychotics: due to not responding to single antipsyhotic    Non-pharmacological treatments  Continue with  "individual, group, milieu and occupational therapy using recovery principles and psycho-education about accepting illness and the need for treatment.  Behavioral health checks every 7 minutes    Safety  Safety and communication plan established to target dynamic risk factors discussed above.    Discharge Plan   To his Aunt with [facility] for ACT when receivied    Interval Progress: Per treatment team, Alberto has been pleasant and cooperative with his care and treatment plan. He had a CK drawn this morning which came back at 387. He attended 7/10 groups yesterday. He has been compliant with his medications and there was no need for psychotropic PRNs yesterday.     Today, Alberto reports his mood as \"okay\". He stated that last night was \"bad\" as his auditory hallucinations increased and were saying that they \"can't wait for me to get up to kill me\". Despite the increase in his auditory hallucinations at night he reported he tried to ignore them and he slept good. However, he reported feeling tired today. He denied any medication side effects. He relayed his goals for the day were to work out and read his bible. He denied SI or HI but reported he sometimes has passive thoughts of death.     Acceptance by patient: accepts inpatient treatment and medications  Hopefulness in recovery: to live with his aunt in the community  Involved in reintegration process: communicates with siblings and aunt   Trusting in relationship with psychiatrist: trusting  Sleep: good  Appetite: good  Compliance with Medications: compliant  Group attendance: 7/10 groups  Significant events: none in the last 24 hours     Mental Status Exam  Appearance: age appropriate, casually dressed in hospital pants and t shirt. Laying in bed with a blanket wrapped around him, adequate grooming.   Behavior: cooperative, calm, good eye contract,   Speech: normal rate, normal volume, normal pitch, clear, coherent, not pressured or hyperverbal.   Mood:  " "\"okay\"  Affect: constricted, mood-congruent  Thought Process: organized, logical, coherent, goal directed, linear  Thought Content: mild paranoia, intrusive thoughts  Perceptual Disturbances: auditory hallucinations of voices that say they can't wait for him to get up to kill him. Denies command hallucinations. Denies involvement of family and says hallucinations only involve him. No visual hallucinations. Not observed responding to internal stimuli or appearing preoccupied.   Risk Potential:  Suicidal ideations none, homicidal ideations none, and potential for aggression not present at time of evaluation  Sensorium: alert and oriented to person, place, time and situation  Cognition: recent and remote memory grossly intact  Consciousness: alert and awake  Attention: attention span and concentration are age appropriate  Intellect: appears to be of average intelligence  Insight: intact  Judgement: intact  Motor Activity: no abnormal movements     Vitals  Temp:  [97.5 °F (36.4 °C)] 97.5 °F (36.4 °C)  HR:  [92-99] 99  Resp:  [18] 18  BP: (129-133)/(67-77) 133/77  SpO2:  [99 %] 99 %  No intake or output data in the 24 hours ending 01/08/25 0940    Lab Results: All Labs For Current Hospital Admission Reviewed  Results from last 7 days   Lab Units 01/07/25  0729   WBC Thousand/uL 9.41   RBC Million/uL 5.49   HEMOGLOBIN g/dL 12.8   HEMATOCRIT % 43.0   MCV fL 78*   PLATELETS Thousands/uL 232   TOTAL NEUT ABS Thousands/µL 4.01       Current Facility-Administered Medications   Medication Dose Route Frequency Provider Last Rate    acetaminophen  650 mg Oral Q4H PRN Jordan C Holter, DO      acetaminophen  650 mg Oral Q6H PRN HOLLI Lion      aluminum-magnesium hydroxide-simethicone  30 mL Oral Q4H PRN Jordan C Holter, DO      amLODIPine  5 mg Oral Daily HOLLI Lion      ARIPiprazole  30 mg Oral Daily Bora Rosario MD      Artificial Tears  1 drop Both Eyes Q3H PRN Jordan C Holter, DO      atropine  1 drop " Sublingual HS Harjeet Torres, DO      haloperidol lactate  2.5 mg Intramuscular Q4H PRN Max 4/day STEVE LionNP      And    LORazepam  1 mg Intramuscular Q4H PRN Max 4/day STEVE LionNP      And    benztropine  0.5 mg Intramuscular Q4H PRN Max 4/day Eveline Hunt CRNP      benztropine  1 mg Intramuscular Q4H PRN Max 6/day Jordan C Holter, DO      haloperidol lactate  5 mg Intramuscular Q4H PRN Max 4/day Eveline Hunt CRNP      And    LORazepam  2 mg Intramuscular Q4H PRN Max 4/day STEVE LionNP      And    benztropine  1 mg Intramuscular Q4H PRN Max 4/day HOLLI Lion      benztropine  1 mg Oral Q4H PRN Max 6/day HOLLI Lion      benztropine  1 mg Oral Q4H PRN Max 6/day Jordan C Holter, DO      bisacodyl  10 mg Rectal Daily PRN HOLLI Lion      calcium carbonate  500 mg Oral BID PRN HOLLI Monge      cloZAPine  250 mg Oral HS Bora Rosario MD      hydrOXYzine HCL  50 mg Oral Q6H PRN Max 4/day Jordan C Holter, DO      Or    diphenhydrAMINE  50 mg Intramuscular Q6H PRN Jordan C Holter, DO      hydrOXYzine HCL  50 mg Oral Q6H PRN Max 4/day HOLLI Lion      Or    diphenhydrAMINE  50 mg Intramuscular Q6H PRN HOLLI Lion      diphenhydrAMINE-zinc acetate   Topical BID PRN HOLLI Lion      docusate sodium  100 mg Oral BID Jourdan Dickens, DO      escitalopram  20 mg Oral Daily HOLLI Lion      famotidine  20 mg Oral BID PRN HOLLI Monge      fluticasone  1 spray Each Nare Daily PRN HOLLI Monge      glycopyrrolate  1 mg Oral BID (AM & Afternoon) Bora Rosario MD      glycopyrrolate  2 mg Oral HS Bora Rosario MD      haloperidol  1 mg Oral Q6H PRN HOLLI Lion      haloperidol  2.5 mg Oral Q4H PRN Max 4/day Eveline Hangey, CRNP      haloperidol  5 mg Oral Q4H PRN Max 4/day HOLLI Lion      hydroCHLOROthiazide  12.5 mg Oral Daily HOLLI Galvan      hydrocortisone   Topical 4x Daily PRN  HOLLI Lion      hydrOXYzine HCL  100 mg Oral Q6H PRN Max 4/day [facility] Cresencioter, DO      Or    LORazepam  2 mg Intramuscular Q6H PRN [facility] Holter, DO      hydrOXYzine HCL  100 mg Oral Q6H PRN Max 4/day HOLLI Lion      Or    LORazepam  2 mg Intramuscular Q6H PRN HOLLI Lion      hydrOXYzine HCL  25 mg Oral Q6H PRN Max 4/day [facility] Holter, DO      ibuprofen  600 mg Oral Q8H PRN HOLLI Lion      influenza vaccine  0.5 mL Intramuscular Once Bora Rosario MD      loratadine  10 mg Oral Daily Brina Guillen MD      magnesium hydroxide  30 mL Oral Once Jourdan Dickens, DO      melatonin  3 mg Oral HS PRN Bora Rosario MD      melatonin  9 mg Oral HS Bora Rosario MD      methocarbamol  500 mg Oral Q6H PRN HOLLI Lion      multivitamin-minerals  1 tablet Oral Daily HOLLI Monge      nicotine polacrilex  2 mg Oral Q4H PRN Bora Rosario MD      OLANZapine  5 mg Oral Q4H PRN Max 3/day [facility] Holter, DO      Or    OLANZapine  2.5 mg Intramuscular Q4H PRN Max 3/day [facility] Holter, DO      OLANZapine  5 mg Oral Q3H PRN Max 3/day [facility] Holter, DO      Or    OLANZapine  5 mg Intramuscular Q3H PRN Max 3/day [facility] Holter, DO      OLANZapine  2.5 mg Oral Q4H PRN Max 6/day [facility] Holter, DO      ondansetron  4 mg Oral Q6H PRN HOLLI Lion      pantoprazole  20 mg Oral [facility] Holter, DO      polyethylene glycol  17 g Oral Daily PRN HOLLI Ghotra      polyethylene glycol  17 g Oral Daily Jourdan Dickens, DO      propranolol  10 mg Oral Q12H KAYLIE HOLLI Lion      senna-docusate sodium  1 tablet Oral Daily PRN Ion  Holter, DO      senna-docusate sodium  2 tablet Oral HS Jourdan Dickens, DO      traZODone  50 mg Oral HS PRN HOLLI Lion      white petrolatum-mineral oil   Topical TID PRN HOLLI Lion         Counseling / Coordination of Care: Total floor / unit time spent today 15 minutes. Greater than 50% of total time was spent  with the patient and / or family counseling and / or somewhat receptive to supportive listening and teaching positive coping skills to deal with symptom mangement.     Patient's Rights, confidentiality and exceptions to confidentiality, use of automated medical record, Behavioral Health Services staff access to medical record, and consent to treatment reviewed.    This note has been dictated and hence there may be problems with punctuation, spelling and formatting and if anyone has any concerns please address them to Dr. Rosario  This note is not shared with patient due to potential for making patient's condition worse by knowing the content of the note.    Note written by Leidy WALLACE and supervised by Dr. Rosario

## 2025-01-08 NOTE — ASSESSMENT & PLAN NOTE
Reviewed on 1/08/25  on nicotine gum as needed and encouraged smoking cessation  Continue to encourage cessation upon discharge

## 2025-01-08 NOTE — NURSING NOTE
Received pt in bed at change of shift with eyes closed; chest movement noted.  Continues the same thus this far as per q 15 min room checks.   Will continue to monitor behavior, sleeping pattern and any medical issues that may arise.    0548:  Attempted to obtain CK; unsuccessful to locate vein.  Sleeping 7+ hrs thus this far

## 2025-01-08 NOTE — NURSING NOTE
Patient has been visible on the unit most of the shift. Social, pleasant and cooperative. Offers no complaints. Denies suicidal ideations. Appetite good. Medication compliant. Attends most groups.

## 2025-01-08 NOTE — PLAN OF CARE
Problem: Ineffective Coping  Goal: Identifies ineffective coping skills  Outcome: Progressing  Goal: Identifies healthy coping skills  Outcome: Progressing  Goal: Demonstrates healthy coping skills  Outcome: Progressing  Goal: Participates in unit activities  Description: Interventions:  - Provide therapeutic environment   - Provide required programming   - Redirect inappropriate behaviors   Outcome: Progressing   He continues to attend and engage in the group activities in the groups.

## 2025-01-08 NOTE — ASSESSMENT & PLAN NOTE
Reviewed on 01/08/25  Follows with medical  Continue famotidine 20 mg tablet BID per medical team   Continue glycopyrrolate 1 mg tablet BID AM and afternoon per medical team  Continue glycopyrrolate 2 mg tablet before bed per medical team  Continue pantoprazole EC 20 mg tablet every morning per medical team  This is a chronic condition, and is stable unless otherwise noted.

## 2025-01-08 NOTE — ASSESSMENT & PLAN NOTE
Reviewed on 01/08/25  Follows with medical team  Continue senna-docusate sodium dose per medical team.

## 2025-01-08 NOTE — NURSING NOTE
Patient has been visible on the unit most of the shift.  Social with peers. Unkept. Smiles on approach.  Medication compliant. Appetite good. Denies any suicidal ideations. Offers no complaints. Group attendance this shift- 1/4. Continues to admit having auditory hallucinations telling him they will harm him and his family. Verbal support given.

## 2025-01-09 PROCEDURE — 99232 SBSQ HOSP IP/OBS MODERATE 35: CPT | Performed by: PSYCHIATRY & NEUROLOGY

## 2025-01-09 RX ORDER — ARIPIPRAZOLE 15 MG/1
15 TABLET ORAL DAILY
Status: COMPLETED | OUTPATIENT
Start: 2025-01-09 | End: 2025-01-10

## 2025-01-09 RX ADMIN — ESCITALOPRAM OXALATE 20 MG: 10 TABLET, FILM COATED ORAL at 08:40

## 2025-01-09 RX ADMIN — DOCUSATE SODIUM 100 MG: 100 CAPSULE, LIQUID FILLED ORAL at 17:03

## 2025-01-09 RX ADMIN — ATROPINE SULFATE 1 DROP: 10 SOLUTION/ DROPS OPHTHALMIC at 21:48

## 2025-01-09 RX ADMIN — NICOTINE POLACRILEX 2 MG: 2 GUM, CHEWING ORAL at 17:03

## 2025-01-09 RX ADMIN — MULTIPLE VITAMINS W/ MINERALS TAB 1 TABLET: TAB ORAL at 08:40

## 2025-01-09 RX ADMIN — CLOZAPINE 250 MG: 25 TABLET ORAL at 21:30

## 2025-01-09 RX ADMIN — PANTOPRAZOLE SODIUM 20 MG: 20 TABLET, DELAYED RELEASE ORAL at 08:39

## 2025-01-09 RX ADMIN — ARIPIPRAZOLE 15 MG: 15 TABLET ORAL at 08:40

## 2025-01-09 RX ADMIN — SENNOSIDES AND DOCUSATE SODIUM 2 TABLET: 50; 8.6 TABLET ORAL at 21:30

## 2025-01-09 RX ADMIN — MELATONIN TAB 3 MG 9 MG: 3 TAB at 21:30

## 2025-01-09 RX ADMIN — NICOTINE POLACRILEX 2 MG: 2 GUM, CHEWING ORAL at 12:56

## 2025-01-09 RX ADMIN — DOCUSATE SODIUM 100 MG: 100 CAPSULE, LIQUID FILLED ORAL at 08:40

## 2025-01-09 RX ADMIN — NICOTINE POLACRILEX 2 MG: 2 GUM, CHEWING ORAL at 08:40

## 2025-01-09 RX ADMIN — NICOTINE POLACRILEX 2 MG: 2 GUM, CHEWING ORAL at 21:30

## 2025-01-09 RX ADMIN — LORATADINE 10 MG: 10 TABLET ORAL at 08:40

## 2025-01-09 RX ADMIN — GLYCOPYRROLATE 1 MG: 1 TABLET ORAL at 13:01

## 2025-01-09 RX ADMIN — PROPRANOLOL HYDROCHLORIDE 10 MG: 10 TABLET ORAL at 21:29

## 2025-01-09 RX ADMIN — GLYCOPYRROLATE 1 MG: 1 TABLET ORAL at 08:39

## 2025-01-09 RX ADMIN — GLYCOPYRROLATE 2 MG: 1 TABLET ORAL at 21:30

## 2025-01-09 NOTE — ASSESSMENT & PLAN NOTE
Reviewed on 1/09/25  Follows with medical team and given dietary counseling and need for exercise

## 2025-01-09 NOTE — NURSING NOTE
Received pt in bed at change of shift with eyes closed; chest movement noted.  Continues the same thus this far as per q 15 min room checks.   Will continue to monitor behavior, sleeping pattern and any medical issues that may arise.    0514:  N/O received from attending.  Please refer to MAR    0631:  Sleeping 7+ hrs thus this far

## 2025-01-09 NOTE — ASSESSMENT & PLAN NOTE
Reviewed on 01/09/25  Follows with medical team  Continue senna-docusate sodium dose per medical team.

## 2025-01-09 NOTE — NURSING NOTE
Patient has been visible on the unit most of the shift. Pleasant and cooperative. Respectful and polite.  Increased smiling during conversation. Medication compliant. Offers no complaints. Social with peers. Unkept at times.

## 2025-01-09 NOTE — NURSING NOTE
Patient had been on the unit all shift. Pleasant and cooperative. Interacts with peers and staff.  Smiling. No c/o auditory hallucinations. Appetite good. Medication compliant. Group attendance- 7/10. Poor hygiene.

## 2025-01-09 NOTE — ASSESSMENT & PLAN NOTE
Reviewed on 01/09/25  No significant changes still with same threatening voices as before not commanding and planning to have a virtual visit with his sisters  Continue medication as follows:  Continue Clozapine 250 mg QHS for psychosis - increased 12/11/24  To consider further careful titration  CBC w/diff and CK every 2 weeks  CK was increased to 960 on 12/12 and oral hydration encouraged, CK improved to 905 on 12/13/24. Repeat on 12/17/24 decreased to 674 and on 12/20/24 decreased to 448  Labs  drawn on 12/24/2024, repeat  trending down  Labs drawn 01/07, ANC 4.01  Labs drawn 01/08,   Start Cobenfy 15/20 mg BID for psychosis  Increase dose after a week to 100/20 mg. Continue this dose for a month then reevaluate   Continue Abilify 15 mg daily for psychosis as he is resistant to single antipsychotic  Decreased on 01/09/25  Continue Lexapro 20 mg daily for depression and anxiety  Continue Robinul 1 mg BID and 2 mg QHS for sialorrhea from Clozapine  Continue Atropine 1% solution 1 drop sublingually QHS for adverse effects of Clozapine  Continue Propanolol 10 mg BID for anxiety   Continue Melatonin 9 mg QHS for sleep  Continue Senna-Docusate sodium one 50 mg tablet QHS for constipation    Pt remains on dual antipsychotic Tx due to failure on monotherapy   .  S/p ECT treatments.  - ACT team referral was made for him living back with his aunt once MA funding comes through from the [facility] and sister will try to reapply

## 2025-01-09 NOTE — PLAN OF CARE
Problem: Alteration in Thoughts and Perception  Goal: Treatment Goal: Gain control of psychotic behaviors/thinking, reduce/eliminate presenting symptoms and demonstrate improved reality functioning upon discharge  Outcome: Progressing  Goal: Refrain from acting on delusional thinking/internal stimuli  Description: Interventions:  - Monitor patient closely, per order   - Utilize least restrictive measures   - Set reasonable limits, give positive feedback for acceptable   - Administer medications as ordered and monitor of potential side effects  Outcome: Progressing  Goal: Agree to be compliant with medication regime, as prescribed and report medication side effects  Description: Interventions:  - Offer appropriate PRN medication and supervise ingestion; conduct AIMS, as needed   Outcome: Progressing  Goal: Recognize dysfunctional thoughts, communicate reality-based thoughts at the time of discharge  Description: Interventions:  - Provide medication and psycho-education to assist patient in compliance and developing insight into his/her illness   Outcome: Progressing     Problem: Ineffective Coping  Goal: Participates in unit activities  Description: Interventions:  - Provide therapeutic environment   - Provide required programming   - Redirect inappropriate behaviors   Outcome: Progressing  Goal: Patient/Family participate in treatment and DC plans  Description: Interventions:  - Provide therapeutic environment  Outcome: Progressing  Goal: Patient/Family verbalizes awareness of resources  Outcome: Progressing     Problem: Depression  Goal: Treatment Goal: Demonstrate behavioral control of depressive symptoms, verbalize feelings of improved mood/affect, and adopt new coping skills prior to discharge  Outcome: Progressing  Goal: Verbalize thoughts and feelings  Description: Interventions:  - Assess and re-assess patient's level of risk   - Engage patient in 1:1 interactions, daily, for a minimum of 15 minutes   -  Encourage patient to express feelings, fears, frustrations, hopes   Outcome: Progressing  Goal: Refrain from harming self  Description: Interventions:  - Monitor patient closely, per order   - Supervise medication ingestion, monitor effects and side effects   Outcome: Progressing  Goal: Refrain from isolation  Description: Interventions:  - Develop a trusting relationship   - Encourage socialization   Outcome: Progressing  Goal: Refrain from self-neglect  Outcome: Progressing     Problem: Anxiety  Goal: Anxiety is at manageable level  Description: Interventions:  - Assess and monitor patient's anxiety level.   - Monitor for signs and symptoms (heart palpitations, chest pain, shortness of breath, headaches, nausea, feeling jumpy, restlessness, irritable, apprehensive).   - Collaborate with interdisciplinary team and initiate plan and interventions as ordered.  - [place] patient to unit/surroundings  - Explain treatment plan  - Encourage participation in care  - Encourage verbalization of concerns/fears  - Identify coping mechanisms  - Assist in developing anxiety-reducing skills  - Administer/offer alternative therapies  - Limit or eliminate stimulants  Outcome: Progressing     Problem: Alteration in Orientation  Goal: Treatment Goal: Demonstrate a reduction of confusion and improved orientation to person, place, time and/or situation upon discharge, according to optimum baseline/ability  Outcome: Progressing  Goal: Interact with staff daily  Description: Interventions:  - Assess and re-assess patient's level of orientation  - Engage patient in 1 on 1 interactions, daily, for a minimum of 15 minutes   - Establish rapport/trust with patient   Outcome: Progressing  Goal: Allow medical examinations, as recommended  Description: Interventions:  - Provide physical/neurological exams and/or referrals, per provider   Outcome: Progressing  Goal: Cooperate with recommended testing/procedures  Description: Interventions:  -  Determine need for ancillary testing  - Observe for mental status changes  - Implement falls/precaution protocol   Outcome: Progressing  Goal: Complete daily ADLs, including personal hygiene independently, as able  Description: Interventions:  - Observe, teach, and assist patient with ADLS  Outcome: Progressing

## 2025-01-09 NOTE — ASSESSMENT & PLAN NOTE
Reviewed on 1/09/25  on nicotine gum as needed and encouraged smoking cessation  Continue to encourage cessation upon discharge

## 2025-01-09 NOTE — PROGRESS NOTES
Progress Note - Behavioral Health   Name: Alberto Berumen 29 y.o. male I MRN: [id number]  Unit/Bed#: [facility] 112-02 I Date of Admission: 3/29/2024   Date of Service: 1/9/2025 I Hospital Day: 286     Assessment & Plan  Schizoaffective disorder, bipolar type (HCC)  Reviewed on 01/09/25  No significant changes still with same threatening voices as before not commanding and planning to have a virtual visit with his sisters  Continue medication as follows:  Continue Clozapine 250 mg QHS for psychosis - increased 12/11/24  To consider further careful titration  CBC w/diff and CK every 2 weeks  CK was increased to 960 on 12/12 and oral hydration encouraged, CK improved to 905 on 12/13/24. Repeat on 12/17/24 decreased to 674 and on 12/20/24 decreased to 448  Labs  drawn on 12/24/2024, repeat  trending down  Labs drawn 01/07, ANC 4.01  Labs drawn 01/08,   Start Cobenfy 15/20 mg BID for psychosis  Increase dose after a week to 100/20 mg. Continue this dose for a month then reevaluate   Continue Abilify 15 mg daily for psychosis as he is resistant to single antipsychotic  Decreased on 01/09/25  Continue Lexapro 20 mg daily for depression and anxiety  Continue Robinul 1 mg BID and 2 mg QHS for sialorrhea from Clozapine  Continue Atropine 1% solution 1 drop sublingually QHS for adverse effects of Clozapine  Continue Propanolol 10 mg BID for anxiety   Continue Melatonin 9 mg QHS for sleep  Continue Senna-Docusate sodium one 50 mg tablet QHS for constipation    Pt remains on dual antipsychotic Tx due to failure on monotherapy   .  S/p ECT treatments.  - ACT team referral was made for him living back with his aunt once MA funding comes through from the [facility] and sister will try to reapply    Chronic idiopathic constipation  Reviewed on 01/09/25  Follows with medical team  Continue senna-docusate sodium dose per medical team.     GERD (gastroesophageal reflux disease)  Reviewed on 01/09/25  Follows with  medical  Continue famotidine 20 mg tablet BID per medical team   Continue glycopyrrolate 1 mg tablet BID AM and afternoon per medical team  Continue glycopyrrolate 2 mg tablet before bed per medical team  Continue pantoprazole EC 20 mg tablet every morning per medical team  This is a chronic condition, and is stable unless otherwise noted.    Tobacco abuse   Reviewed on 1/09/25  on nicotine gum as needed and encouraged smoking cessation  Continue to encourage cessation upon discharge    Elevated hemoglobin A1c  Reviewed on 1/09/25  Follows with medical team    Class 2 obesity in adult  Reviewed on 1/09/25  Follows with medical team and given dietary counseling and need for exercise    Primary hypertension  Reviewed on 1/09/25  Follows with medical team on hydrochlorothiazide and Norvasc        Psychiatry Progress Note [facility]    Progress towards goals: moderate progress    Review of systems: denied    Psychiatric Diagnosis: Schizoaffective disorder, bipolar type     Assessment  Overall Status:  progressing, no significant changes   Certification Statement: The patient will continue to require additional inpatient hospital stay due to psychotic related symptoms and limited response to medications and ECT        Medications: as above  Side effects from treatment: denied  Medication changes: as above  Medication education: Risks side effects benefits and precautions of medications discussed with patient and they did verbalize an understanding about risks for metabolic syndrome from being on neuroleptics and tardive dyskinesia etc.  All medications reviewed and I recommend that they be continued for symptom management  Understanding of medications: Risks side effects benefits and precautions of medications discussed with patient and they did verbalize an understanding about risks for metabolic syndrome from being on neuroleptics and tardive dyskinesia etc., and patient appeared to  have  "understanding.  Justification for dual anti-psychotics: due to not responding to single antipsychotic    Non-pharmacological treatments  Continue with individual, group, milieu and occupational therapy using recovery principles and psycho-education about accepting illness and the need for treatment.  Behavioral health checks every 7 minutes    Safety  Safety and communication plan established to target dynamic risk factors discussed above.    Discharge Plan   To his Aunt with [facility] [place] for ACT when received    Interval Progress:   Per treatment team, Alberto has been pleasant and cooperative with his care and treatment plan. He attended 7/10 groups yesterday. He has been compliant with his medications and there was no need for psychotropic PRNs yesterday. Treatment team reported that Alberto was involved in a verbal altercation with a peer but he was not the instigator and overall was the \"bigger person\" in the situation. He was reported to have self redirected and walked away. His Abilify was decreased to 15 mg daily from 30 mg daily. Treatment team also relayed that Alberto told them he was having auditory hallucinations yesterday that said they were going to hurt his family.     Today, Alberto reports his mood as \"stable\". He relayed that his goal today was to work out. He denied any medication side effects and said his sleep was good last night and his appetite was good yesterday. He denied SI, HI, or passive thoughts of death. He said that he was still having frequent auditory hallucinations, particularly at night, and that he just goes to sleep to get them to go away. He said the voices say that they can't wait to kill him. He denied involvement of his family or other people. He denied command hallucinations.     Acceptance by patient: accepts inpatient treatment and medications  Hopefulness in recovery: to live with his aunt in the community  Involved in reintegration process: communicates with siblings " "and aunt  Trusting in relationship with psychiatrist: trusting  Sleep: good  Appetite: good  Compliance with Medications: compliant  Group attendance: 7/10 groups  Significant events: verbal altercation with another patient. Behavior under control and was self redirected.    Mental Status Exam  Appearance:  age appropriate, casually dressed in hospital pants and t shirt. Laying in bed with a blanket wrapped around him, adequate grooming   Behavior: cooperative, calm, good eye contact, removed blanket from face during conversation, easily aroused from sleep  Speech: normal rate, normal volume, normal pitch, clear, coherent, not pressured or hyper-verbal  Mood:  \"stable\"  Affect: constricted, mood-congruent  Thought Process: organized, logical, coherent, goal directed, linear  Thought Content: mild paranoia, intrusive thoughts  Perceptual Disturbances: auditory hallucinations of voices that say they can't wait for him to get up to kill him. Denies command hallucinations. Denies involvement of family and says hallucinations only involve him. No visual hallucinations. Not observed responding to internal stimuli or appearing preoccupied.  Risk Potential:  Suicidal ideations none, homicidal ideations none, and potential for aggression not present at time of evaluation.  Sensorium: alert and oriented to person, place, time and situation  Cognition: recent and remote memory grossly intact  Consciousness: alert and awake  Attention: attention span and concentration are age appropriate  Intellect: appears to be of average intelligence  Insight: intact  Judgement: intact  Motor Activity: no abnormal movements     Vitals  Temp:  [97.5 °F (36.4 °C)-97.7 °F (36.5 °C)] 97.5 °F (36.4 °C)  HR:  [83-86] 83  Resp:  [18] 18  BP: (108-132)/(60-68) 108/60  SpO2:  [98 %-100 %] 100 %  No intake or output data in the 24 hours ending 01/09/25 1007    Lab Results: All Labs For Current Hospital Admission Reviewed  Results from last 7 days "   Lab Units 01/07/25  0729   WBC Thousand/uL 9.41   RBC Million/uL 5.49   HEMOGLOBIN g/dL 12.8   HEMATOCRIT % 43.0   MCV fL 78*   PLATELETS Thousands/uL 232   TOTAL NEUT ABS Thousands/µL 4.01       Current Facility-Administered Medications   Medication Dose Route Frequency Provider Last Rate    acetaminophen  650 mg Oral Q4H PRN Jordan C Holter, DO      acetaminophen  650 mg Oral Q6H PRN HOLLI Lion      aluminum-magnesium hydroxide-simethicone  30 mL Oral Q4H PRN Jordan C Holter, DO      amLODIPine  5 mg Oral Daily HOLLI Lion      ARIPiprazole  15 mg Oral Daily Bora Rosario MD      Artificial Tears  1 drop Both Eyes Q3H PRN Jordan C Holter, DO      atropine  1 drop Sublingual  Harjeet MelissaDO jose carlos      haloperidol lactate  2.5 mg Intramuscular Q4H PRN Max 4/day HOLLI Lion      And    LORazepam  1 mg Intramuscular Q4H PRN Max 4/day HOLLI Lion      And    benztropine  0.5 mg Intramuscular Q4H PRN Max 4/day HOLLI Lion      benztropine  1 mg Intramuscular Q4H PRN Max 6/day Jordan C Holter, DO      haloperidol lactate  5 mg Intramuscular Q4H PRN Max 4/day HOLLI Lion      And    LORazepam  2 mg Intramuscular Q4H PRN Max 4/day HOLLI Lion      And    benztropine  1 mg Intramuscular Q4H PRN Max 4/day HOLLI Lion      benztropine  1 mg Oral Q4H PRN Max 6/day HOLLI Lion      benztropine  1 mg Oral Q4H PRN Max 6/day Jordan C Holter, DO      bisacodyl  10 mg Rectal Daily PRN HOLLI Lion      calcium carbonate  500 mg Oral BID PRN HOLLI Monge      cloZAPine  250 mg Oral HS Bora Rosario MD      hydrOXYzine HCL  50 mg Oral Q6H PRN Max 4/day Jordan C Holter, DO      Or    diphenhydrAMINE  50 mg Intramuscular Q6H PRN Jordan C Holter, DO      hydrOXYzine HCL  50 mg Oral Q6H PRN Max 4/day HOLLI Lion      Or    diphenhydrAMINE  50 mg Intramuscular Q6H PRN HOLLI Lion      diphenhydrAMINE-zinc acetate   Topical BID PRN Eveline  HOLLI Hunt      docusate sodium  100 mg Oral BID Jourdan Dickens, DO      escitalopram  20 mg Oral Daily HOLLI Lion      famotidine  20 mg Oral BID PRN HOLLI Monge      fluticasone  1 spray Each Nare Daily PRN HOLLI Monge      glycopyrrolate  1 mg Oral BID (AM & Afternoon) Bora Rosario MD      glycopyrrolate  2 mg Oral HS Bora Rosario MD      haloperidol  1 mg Oral Q6H PRN HOLLI Lion      haloperidol  2.5 mg Oral Q4H PRN Max 4/day HOLLI Lion      haloperidol  5 mg Oral Q4H PRN Max 4/day HOLLI Lion      hydroCHLOROthiazide  12.5 mg Oral Daily Pia Mantilla, HOLLI      hydrocortisone   Topical 4x Daily PRN HOLLI Lion      hydrOXYzine HCL  100 mg Oral Q6H PRN Max 4/day [facility] Holter, DO      Or    LORazepam  2 mg Intramuscular Q6H PRN [facility] Holter, DO      hydrOXYzine HCL  100 mg Oral Q6H PRN Max 4/day HOLLI Lion      Or    LORazepam  2 mg Intramuscular Q6H PRN HOLLI Lion      hydrOXYzine HCL  25 mg Oral Q6H PRN Max 4/day Ion C Holter, DO      ibuprofen  600 mg Oral Q8H PRN HOLLI Lion      influenza vaccine  0.5 mL Intramuscular Once Bora Rosario MD      loratadine  10 mg Oral Daily Brina Guillen MD      magnesium hydroxide  30 mL Oral Once Jourdan Dickens, DO      melatonin  3 mg Oral HS PRN Bora Rosario MD      melatonin  9 mg Oral HS Bora Rosario MD      methocarbamol  500 mg Oral Q6H PRN HOLLI Lion      multivitamin-minerals  1 tablet Oral Daily HOLLI Monge      nicotine polacrilex  2 mg Oral Q4H PRN Bora Rosario MD      NON FORMULARY  50 mg Oral Daily Before Breakfast Bora Rosario MD      OLANZapine  5 mg Oral Q4H PRN Max 3/day Ion C Holter, DO      Or    OLANZapine  2.5 mg Intramuscular Q4H PRN Max 3/day Ion C Holter, DO      OLANZapine  5 mg Oral Q3H PRN Max 3/day Ion C Holter, DO      Or    OLANZapine  5 mg Intramuscular Q3H PRN Max 3/day Ion FISCHER  Holter, DO      OLANZapine  2.5 mg Oral Q4H PRN Max 6/day Jordan C Holter, DO      ondansetron  4 mg Oral Q6H PRN HOLLI Lion      pantoprazole  20 mg Oral QAM Ion Dupontter, DO      polyethylene glycol  17 g Oral Daily PRN Sofi Yoo, HOLLI      polyethylene glycol  17 g Oral Daily Jourdan Dickens, DO      propranolol  10 mg Oral Q12H KAYLIE HOLLI Lion      senna-docusate sodium  1 tablet Oral Daily PRN Jordan C Holter, DO      senna-docusate sodium  2 tablet Oral HS Jourdan Dickens, DO      traZODone  50 mg Oral HS PRN HOLLI Lion      white petrolatum-mineral oil   Topical TID PRN HOLLI Lion         Counseling / Coordination of Care: Total floor / unit time spent today 15 minutes. Greater than 50% of total time was spent with the patient and / or family counseling and / or somewhat receptive to supportive listening and teaching positive coping skills to deal with symptom mangement.     Patient's Rights, confidentiality and exceptions to confidentiality, use of automated medical record, Behavioral Health Services staff access to medical record, and consent to treatment reviewed.    This note has been dictated and hence there may be problems with punctuation, spelling and formatting and if anyone has any concerns please address them to Dr. Rosaroi.  This note is not shared with patient due to potential for making patient's condition worse by knowing the content of the note.    Written by Leidy WALLACE and supervised by Dr. Rosario

## 2025-01-09 NOTE — ASSESSMENT & PLAN NOTE
Reviewed on 01/09/25  Follows with medical  Continue famotidine 20 mg tablet BID per medical team   Continue glycopyrrolate 1 mg tablet BID AM and afternoon per medical team  Continue glycopyrrolate 2 mg tablet before bed per medical team  Continue pantoprazole EC 20 mg tablet every morning per medical team  This is a chronic condition, and is stable unless otherwise noted.

## 2025-01-09 NOTE — ASSESSMENT & PLAN NOTE
Reviewed on 11/20/24  Follows with medical  Continue famotidine 20 mg tablet BID per medical team   Continue glycopyrrolate 1 mg tablet BID AM and afternoon per medical team  Continue glycopyrrolate 2 mg tablet before bed per medical team  Continue pantoprazole EC 20 mg tablet every morning per medical team  No associated orders from this encounter found during lookback period of 72 hours.   full weight-bearing

## 2025-01-09 NOTE — NURSING NOTE
Patient has been visible on the unit most of the shift. Social, pleasant and cooperative. Offers no complaints. Denies any suicidal ideations. Medication compliant. Unkept at times. Brighter affect. Attends most of the groups.

## 2025-01-10 PROCEDURE — 99232 SBSQ HOSP IP/OBS MODERATE 35: CPT | Performed by: PSYCHIATRY & NEUROLOGY

## 2025-01-10 RX ADMIN — GLYCOPYRROLATE 1 MG: 1 TABLET ORAL at 08:59

## 2025-01-10 RX ADMIN — DOCUSATE SODIUM 100 MG: 100 CAPSULE, LIQUID FILLED ORAL at 09:00

## 2025-01-10 RX ADMIN — GLYCOPYRROLATE 1 MG: 1 TABLET ORAL at 14:35

## 2025-01-10 RX ADMIN — AMLODIPINE BESYLATE 5 MG: 5 TABLET ORAL at 09:00

## 2025-01-10 RX ADMIN — NICOTINE POLACRILEX 2 MG: 2 GUM, CHEWING ORAL at 09:00

## 2025-01-10 RX ADMIN — CLOZAPINE 250 MG: 25 TABLET ORAL at 21:31

## 2025-01-10 RX ADMIN — NICOTINE POLACRILEX 2 MG: 2 GUM, CHEWING ORAL at 12:30

## 2025-01-10 RX ADMIN — NICOTINE POLACRILEX 2 MG: 2 GUM, CHEWING ORAL at 21:31

## 2025-01-10 RX ADMIN — GLYCOPYRROLATE 2 MG: 1 TABLET ORAL at 21:31

## 2025-01-10 RX ADMIN — LORATADINE 10 MG: 10 TABLET ORAL at 08:59

## 2025-01-10 RX ADMIN — DOCUSATE SODIUM 100 MG: 100 CAPSULE, LIQUID FILLED ORAL at 17:09

## 2025-01-10 RX ADMIN — MELATONIN TAB 3 MG 9 MG: 3 TAB at 21:31

## 2025-01-10 RX ADMIN — ARIPIPRAZOLE 15 MG: 15 TABLET ORAL at 08:59

## 2025-01-10 RX ADMIN — XANOMELINE AND TROSPIUM CHLORIDE 50 MG: 20; 50 CAPSULE, COATED PELLETS ORAL at 17:08

## 2025-01-10 RX ADMIN — PROPRANOLOL HYDROCHLORIDE 10 MG: 10 TABLET ORAL at 09:00

## 2025-01-10 RX ADMIN — PANTOPRAZOLE SODIUM 20 MG: 20 TABLET, DELAYED RELEASE ORAL at 09:00

## 2025-01-10 RX ADMIN — ATROPINE SULFATE 1 DROP: 10 SOLUTION/ DROPS OPHTHALMIC at 21:32

## 2025-01-10 RX ADMIN — ESCITALOPRAM OXALATE 20 MG: 10 TABLET, FILM COATED ORAL at 09:01

## 2025-01-10 RX ADMIN — HYDROCHLOROTHIAZIDE 12.5 MG: 12.5 TABLET ORAL at 09:00

## 2025-01-10 RX ADMIN — NICOTINE POLACRILEX 2 MG: 2 GUM, CHEWING ORAL at 17:10

## 2025-01-10 RX ADMIN — SENNOSIDES AND DOCUSATE SODIUM 2 TABLET: 50; 8.6 TABLET ORAL at 21:31

## 2025-01-10 RX ADMIN — MULTIPLE VITAMINS W/ MINERALS TAB 1 TABLET: TAB ORAL at 08:59

## 2025-01-10 RX ADMIN — XANOMELINE AND TROSPIUM CHLORIDE 50 MG: 20; 50 CAPSULE, COATED PELLETS ORAL at 11:33

## 2025-01-10 RX ADMIN — PROPRANOLOL HYDROCHLORIDE 10 MG: 10 TABLET ORAL at 21:31

## 2025-01-10 NOTE — PLAN OF CARE
Problem: Alteration in Thoughts and Perception  Goal: Refrain from acting on delusional thinking/internal stimuli  Description: Interventions:  - Monitor patient closely, per order   - Utilize least restrictive measures   - Set reasonable limits, give positive feedback for acceptable   - Administer medications as ordered and monitor of potential side effects  Outcome: Progressing  Goal: Agree to be compliant with medication regime, as prescribed and report medication side effects  Description: Interventions:  - Offer appropriate PRN medication and supervise ingestion; conduct AIMS, as needed   Outcome: Progressing  Goal: Recognize dysfunctional thoughts, communicate reality-based thoughts at the time of discharge  Description: Interventions:  - Provide medication and psycho-education to assist patient in compliance and developing insight into his/her illness   Outcome: Progressing     Problem: Ineffective Coping  Goal: Participates in unit activities  Description: Interventions:  - Provide therapeutic environment   - Provide required programming   - Redirect inappropriate behaviors   Outcome: Progressing  Goal: Patient/Family participate in treatment and DC plans  Description: Interventions:  - Provide therapeutic environment  Outcome: Progressing  Goal: Patient/Family verbalizes awareness of resources  Outcome: Progressing     Problem: Depression  Goal: Treatment Goal: Demonstrate behavioral control of depressive symptoms, verbalize feelings of improved mood/affect, and adopt new coping skills prior to discharge  Outcome: Progressing  Goal: Verbalize thoughts and feelings  Description: Interventions:  - Assess and re-assess patient's level of risk   - Engage patient in 1:1 interactions, daily, for a minimum of 15 minutes   - Encourage patient to express feelings, fears, frustrations, hopes   Outcome: Progressing  Goal: Refrain from harming self  Description: Interventions:  - Monitor patient closely, per order    - Supervise medication ingestion, monitor effects and side effects   Outcome: Progressing  Goal: Refrain from isolation  Description: Interventions:  - Develop a trusting relationship   - Encourage socialization   Outcome: Progressing  Goal: Refrain from self-neglect  Outcome: Progressing     Problem: Anxiety  Goal: Anxiety is at manageable level  Description: Interventions:  - Assess and monitor patient's anxiety level.   - Monitor for signs and symptoms (heart palpitations, chest pain, shortness of breath, headaches, nausea, feeling jumpy, restlessness, irritable, apprehensive).   - Collaborate with interdisciplinary team and initiate plan and interventions as ordered.  - [place] patient to unit/surroundings  - Explain treatment plan  - Encourage participation in care  - Encourage verbalization of concerns/fears  - Identify coping mechanisms  - Assist in developing anxiety-reducing skills  - Administer/offer alternative therapies  - Limit or eliminate stimulants  Outcome: Progressing     Problem: Alteration in Orientation  Goal: Treatment Goal: Demonstrate a reduction of confusion and improved orientation to person, place, time and/or situation upon discharge, according to optimum baseline/ability  Outcome: Progressing  Goal: Interact with staff daily  Description: Interventions:  - Assess and re-assess patient's level of orientation  - Engage patient in 1 on 1 interactions, daily, for a minimum of 15 minutes   - Establish rapport/trust with patient   Outcome: Progressing  Goal: Allow medical examinations, as recommended  Description: Interventions:  - Provide physical/neurological exams and/or referrals, per provider   Outcome: Progressing  Goal: Cooperate with recommended testing/procedures  Description: Interventions:  - Determine need for ancillary testing  - Observe for mental status changes  - Implement falls/precaution protocol   Outcome: Progressing  Goal: Complete daily ADLs, including personal hygiene  independently, as able  Description: Interventions:  - Observe, teach, and assist patient with ADLS  Outcome: Progressing     Problem: DISCHARGE PLANNING  Goal: Discharge to home with appropriate resources  Description: INTERVENTIONS:  - Identify barriers to discharge w/patient and caregiver  - Arrange for needed discharge resources and transportation as appropriate  - Identify discharge learning needs (meds, wound care, etc.)  - Refer to Case Management Department for coordinating discharge planning if the patient needs post-hospital services based on physician/advanced practitioner order or complex needs related to functional status, cognitive ability, or social support system  Outcome: Progressing     Problem: Alteration in Thoughts and Perception  Goal: Treatment Goal: Gain control of psychotic behaviors/thinking, reduce/eliminate presenting symptoms and demonstrate improved reality functioning upon discharge  Outcome: Not Progressing

## 2025-01-10 NOTE — PROGRESS NOTES
Progress Note - Behavioral Health   Name: Alberto Berumen 29 y.o. male I MRN: [id number]  Unit/Bed#: [facility] 112-02 I Date of Admission: 3/29/2024   Date of Service: 1/10/2025 I Hospital Day: 287     Assessment & Plan  Schizoaffective disorder, bipolar type (HCC)  Reviewed on 01/10/25  No significant changes still with same threatening voices as before not commanding and planning to have a virtual visit with his sisters  Continue medication as follows:  Continue Clozapine 250 mg QHS for psychosis - increased 12/11/24  To consider further careful titration  CBC w/diff and CK every 2 weeks  CK was increased to 960 on 12/12 and oral hydration encouraged, CK improved to 905 on 12/13/24. Repeat on 12/17/24 decreased to 674 and on 12/20/24 decreased to 448  Labs  drawn on 12/24/2024, repeat  trending down  Labs drawn 01/07, ANC 4.01  Labs drawn 01/08,   Start Cobenfy 50/20 mg BID for psychosis (note syntax error from note on 01/09, 50/20 mg is the correct dose)  Increase dose after 7 days to 100/20 mg. Continue this dose for a month then reevaluate   Continue Abilify 15 mg daily for psychosis as he is resistant to single antipsychotic  Decreased on 01/09/25  Continue Lexapro 20 mg daily for depression and anxiety  Continue Robinul 1 mg BID and 2 mg QHS for sialorrhea from Clozapine  Continue Atropine 1% solution 1 drop sublingually QHS for adverse effects of Clozapine  Continue Propanolol 10 mg BID for anxiety   Continue Melatonin 9 mg QHS for sleep  Continue Senna-Docusate sodium one 50 mg tablet QHS for constipation    Pt remains on dual antipsychotic Tx due to failure on monotherapy   .  S/p ECT treatments.  - ACT team referral was made for him living back with his aunt once MA funding comes through from the [facility] and sister will try to reapply    Chronic idiopathic constipation  Reviewed on 01/10/25  Follows with medical team  Continue senna-docusate sodium dose per medical team.     GERD  (gastroesophageal reflux disease)  Reviewed on 01/10/25  Follows with medical  Continue famotidine 20 mg tablet BID per medical team   Continue glycopyrrolate 1 mg tablet BID AM and afternoon per medical team  Continue glycopyrrolate 2 mg tablet before bed per medical team  Continue pantoprazole EC 20 mg tablet every morning per medical team  This is a chronic condition, and is stable unless otherwise noted.    Tobacco abuse   Reviewed on 1/10/25  on nicotine gum as needed and encouraged smoking cessation  Continue to encourage cessation upon discharge    Elevated hemoglobin A1c  Reviewed on 1/10/25  Follows with medical team    Class 2 obesity in adult  Reviewed on 1/10/25  Follows with medical team and given dietary counseling and need for exercise    Primary hypertension  Reviewed on 1/10/25  Follows with medical team on hydrochlorothiazide and Norvasc        Psychiatry Progress Note [facility]    Progress towards goals: moderate progress     Review of systems: denied    Psychiatric Diagnosis: Schizoaffective disorder, bipolar type     Assessment  Overall Status:  progressing, no significant changes  Certification Statement: The patient will continue to require additional inpatient hospital stay due to psychotic related symptoms and limited response to medications and ECT.        Medications: as above  Side effects from treatment: denied  Medication changes: as above  Medication education: Risks side effects benefits and precautions of medications discussed with patient and they did verbalize an understanding about risks for metabolic syndrome from being on neuroleptics and tardive dyskinesia etc.  All medications reviewed and I recommend that they be continued for symptom management  Understanding of medications: Risks side effects benefits and precautions of medications discussed with patient and they did verbalize an understanding about risks for metabolic syndrome from being on  "neuroleptics and tardive dyskinesia etc., and patient appeared to have understanding.  Justification for dual anti-psychotics: due to not responding to single antipsychotic    Non-pharmacological treatments  Continue with individual, group, milieu and occupational therapy using recovery principles and psycho-education about accepting illness and the need for treatment.  Behavioral health checks every 7 minutes    Safety  Safety and communication plan established to target dynamic risk factors discussed above.    Discharge Plan   To his Aunt with [facility] for ACT when received    Interval Progress:   Per treatment team, Alberto has been pleasant, visible, cooperative and social on the unit. He attended 8/10 groups yesterday. Staff have been encouraging him to drink fluids for his elevated CK. Treatment team relayed that Alberto did not take his new medication yet this morning as they are waiting to receive it from pharmacy. It was relayed that lAberto said yesterday that he was excited to start his new medication. He has been compliant with his medications and there was no need for psychotropic PRNs yesterday.     Today, Alberto reports his mood as \"stable\". He said his goal for the day was to work out. He stated that he did not sleep well last night as he had trouble staying asleep and that today he felt exhausted due to that. He endorsed continued auditory hallucinations. He said the voices were saying that \"they swear to god they are going to kill me\". He denied command hallucinations or involvement of family or other people. He stated he was not excited about his new medication because he does not know how he will feel on it. He denied SI/HI or passive thoughts of death. He denied any medication side effects.     Acceptance by patient: accepts inpatient treatment and medications  Hopefulness in recovery: to live with his aunt in the community  Involved in reintegration process: communicates with siblings " "and aunt  Trusting in relationship with psychiatrist: trusting  Sleep: poor- had difficulty staying asleep  Appetite: good  Compliance with Medications: compliant  Group attendance: 8/10 groups  Significant events: none in the last 24 hours     Mental Status Exam  Appearance:  age appropriate, casually dressed, laying in bed with a blanket wrapped around him and over his face, adequate grooming  Behavior: cooperative, calm, good eye contract, removed blanket from face during conversation, easily aroused from sleep  Speech: normal rate, normal volume, normal pitch, clear, coherent, not pressured or hyper-verbal  Mood:  \"stable\"  Affect: constricted, mood-congruent  Thought Process: organized, logical, coherent, goal directed, linear  Thought Content: mild paranoia, intrusive thoughts  Perceptual Disturbances: auditory hallucinations of voices that say \"they swear to god they are going to kill me\". Denies command hallucinations. Denies involvement of family and says hallucinations only involve him. No visual hallucinations. Not observed responding to internal stimuli or appearing preoccupied.  Risk Potential: Suicidal Ideations none, Homicidal Ideations none, and Potential for Aggression No  Sensorium: alert and oriented to person, place, time and situation  Cognition: recent and remote memory grossly intact  Consciousness: alert and awake  Attention: attention span and concentration are age appropriate  Intellect: appears to be of average intelligence  Insight: intact  Judgement: intact  Motor Activity: no abnormal movements     Vitals  Temp:  [97.5 °F (36.4 °C)-97.8 °F (36.6 °C)] 97.8 °F (36.6 °C)  HR:  [80-92] 80  Resp:  [18] 18  BP: (126-135)/(71-77) 135/77  SpO2:  [98 %-99 %] 99 %  No intake or output data in the 24 hours ending 01/10/25 0857    Lab Results: All Labs For Current Hospital Admission Reviewed  Results from last 7 days   Lab Units 01/07/25  0729   WBC Thousand/uL 9.41   RBC Million/uL 5.49 "   HEMOGLOBIN g/dL 12.8   HEMATOCRIT % 43.0   MCV fL 78*   PLATELETS Thousands/uL 232   TOTAL NEUT ABS Thousands/µL 4.01       Current Facility-Administered Medications   Medication Dose Route Frequency Provider Last Rate    acetaminophen  650 mg Oral Q4H PRN Jordan C Holter, DO      acetaminophen  650 mg Oral Q6H PRN HOLLI Lion      aluminum-magnesium hydroxide-simethicone  30 mL Oral Q4H PRN Jordan C Holter, DO      amLODIPine  5 mg Oral Daily HOLLI Lion      ARIPiprazole  15 mg Oral Daily Bora Rosario MD      Artificial Tears  1 drop Both Eyes Q3H PRN Jordan C Holter, DO      atropine  1 drop Sublingual HS Harjeet TorresDO      haloperidol lactate  2.5 mg Intramuscular Q4H PRN Max 4/day HOLLI Lion      And    LORazepam  1 mg Intramuscular Q4H PRN Max 4/day HOLLI Lion      And    benztropine  0.5 mg Intramuscular Q4H PRN Max 4/day HOLLI Lion      benztropine  1 mg Intramuscular Q4H PRN Max 6/day Jordan C Holter, DO      haloperidol lactate  5 mg Intramuscular Q4H PRN Max 4/day HOLLI Lion      And    LORazepam  2 mg Intramuscular Q4H PRN Max 4/day HOLLI Lion      And    benztropine  1 mg Intramuscular Q4H PRN Max 4/day HOLLI Lion      benztropine  1 mg Oral Q4H PRN Max 6/day HOLLI Lion      benztropine  1 mg Oral Q4H PRN Max 6/day Jordan C Holter, DO      bisacodyl  10 mg Rectal Daily PRN HOLLI Lion      calcium carbonate  500 mg Oral BID PRN HOLLI Monge      cloZAPine  250 mg Oral HS Bora Rosario MD      hydrOXYzine HCL  50 mg Oral Q6H PRN Max 4/day Jordan C Holter, DO      Or    diphenhydrAMINE  50 mg Intramuscular Q6H PRN Jordan C Holter, DO      hydrOXYzine HCL  50 mg Oral Q6H PRN Max 4/day HOLLI Lion      Or    diphenhydrAMINE  50 mg Intramuscular Q6H PRN HOLLI Lion      diphenhydrAMINE-zinc acetate   Topical BID PRN HOLLI Lion      docusate sodium  100 mg Oral BID Jourdan Dickens DO       escitalopram  20 mg Oral Daily HOLLI Lion      famotidine  20 mg Oral BID PRN HOLLI Monge      fluticasone  1 spray Each Nare Daily PRN HOLLI Monge      glycopyrrolate  1 mg Oral BID (AM & Afternoon) Bora Rosario MD      glycopyrrolate  2 mg Oral HS Bora Rosario MD      haloperidol  1 mg Oral Q6H PRN HOLLI Lion      haloperidol  2.5 mg Oral Q4H PRN Max 4/day HOLLI Lion      haloperidol  5 mg Oral Q4H PRN Max 4/day HOLLI Lion      hydroCHLOROthiazide  12.5 mg Oral Daily HOLLI Galvan      hydrocortisone   Topical 4x Daily PRN HOLLI Lion      hydrOXYzine HCL  100 mg Oral Q6H PRN Max 4/day Ion C Holter, DO      Or    LORazepam  2 mg Intramuscular Q6H PRN Ion C Holter, DO      hydrOXYzine HCL  100 mg Oral Q6H PRN Max 4/day HOLLI Lion      Or    LORazepam  2 mg Intramuscular Q6H PRN HOLLI Lion      hydrOXYzine HCL  25 mg Oral Q6H PRN Max 4/day Ion C Holter, DO      ibuprofen  600 mg Oral Q8H PRN HOLLI Lion      influenza vaccine  0.5 mL Intramuscular Once Bora Rosario MD      loratadine  10 mg Oral Daily Brina Guillen MD      magnesium hydroxide  30 mL Oral Once Jourdan Dickens, DO      melatonin  3 mg Oral HS PRN Bora Rosario MD      melatonin  9 mg Oral HS Bora Rosario MD      methocarbamol  500 mg Oral Q6H PRN HOLLI Lion      multivitamin-minerals  1 tablet Oral Daily HOLLI Monge      nicotine polacrilex  2 mg Oral Q4H PRN Bora Rosario MD      OLANZapine  5 mg Oral Q4H PRN Max 3/day Ion C Holter, DO      Or    OLANZapine  2.5 mg Intramuscular Q4H PRN Max 3/day Oin C Holter, DO      OLANZapine  5 mg Oral Q3H PRN Max 3/day Ion C Holter, DO      Or    OLANZapine  5 mg Intramuscular Q3H PRN Max 3/day Ion C Holter, DO      OLANZapine  2.5 mg Oral Q4H PRN Max 6/day Ion C Holter, DO      ondansetron  4 mg Oral Q6H PRN HOLLI Lion      pantoprazole   20 mg Oral QAM Jordan C Holter, DO      polyethylene glycol  17 g Oral Daily PRN SofiHOLLI Aggarwal      polyethylene glycol  17 g Oral Daily Jourdan Dickens, DO      propranolol  10 mg Oral Q12H [facility] HOLLI Lion      senna-docusate sodium  1 tablet Oral Daily PRN Jordan C Holter, DO      senna-docusate sodium  2 tablet Oral HS Jourdan Dickens, DO      traZODone  50 mg Oral HS PRN HOLLI Lion      white petrolatum-mineral oil   Topical TID PRN HOLLI Lion      [START ON 1/17/2025] Xanomeline-Trospium Chloride  1 capsule Oral BID AC Bora Rosario MD      Xanomeline-Trospium Chloride  50 mg Oral BID AC Bora Rosario MD         Counseling / Coordination of Care: Total floor / unit time spent today 15 minutes. Greater than 50% of total time was spent with the patient and / or family counseling and / or somewhat receptive to supportive listening and teaching positive coping skills to deal with symptom mangement.     Patient's Rights, confidentiality and exceptions to confidentiality, use of automated medical record, Behavioral Health Services staff access to medical record, and consent to treatment reviewed.    This note has been dictated and hence there may be problems with punctuation, spelling and formatting and if anyone has any concerns please address them to Dr. Rosario.  This note is not shared with patient due to potential for making patient's condition worse by knowing the content of the note.    Note written by Leidy WALLACE and supervised by Dr. Rosario

## 2025-01-10 NOTE — PLAN OF CARE
Problem: Ineffective Coping  Goal: Identifies ineffective coping skills  Outcome: Progressing  Goal: Identifies healthy coping skills  Outcome: Progressing  Goal: Demonstrates healthy coping skills  Outcome: Progressing  Goal: Participates in unit activities  Description: Interventions:  - Provide therapeutic environment   - Provide required programming   - Redirect inappropriate behaviors   Outcome: Progressing   He continues to attend and engage in groups.

## 2025-01-10 NOTE — SOCIAL WORK
This writer checked [employer] and patient's MA application was denied. This writer spoke with Mr. Smith with [employer] ([phone]) regarding the denial. He reports patient's income exceeds the maximum income for the Medicare Buy In ($1695). This writer spoke with patient's sister Marleny and requested a copy of patient's [employer]SA rewards letter to verify patient's benefit. Marleny and Gladys will send letter to the this writer once obtained. This writer met with patient and discussed his treatment and benefits. This writer confirmed in person with Aunt Mala on 1/17/20 and [employer] visit on 1/20 to obtain his new ID.   This writer emailed ACT teams and requested an update on his referral.

## 2025-01-10 NOTE — NURSING NOTE
Patient reports no s/s, offers no complaints. Pt is social with with peers and staff, very pleasant. Pt takes medications without issue. Pt is playing ping pong with peers.

## 2025-01-10 NOTE — ASSESSMENT & PLAN NOTE
Reviewed on 01/10/25  Follows with medical team  Continue senna-docusate sodium dose per medical team.

## 2025-01-10 NOTE — ASSESSMENT & PLAN NOTE
Reviewed on 01/10/25  No significant changes still with same threatening voices as before not commanding and planning to have a virtual visit with his sisters  Continue medication as follows:  Continue Clozapine 250 mg QHS for psychosis - increased 12/11/24  To consider further careful titration  CBC w/diff and CK every 2 weeks  CK was increased to 960 on 12/12 and oral hydration encouraged, CK improved to 905 on 12/13/24. Repeat on 12/17/24 decreased to 674 and on 12/20/24 decreased to 448  Labs  drawn on 12/24/2024, repeat  trending down  Labs drawn 01/07, ANC 4.01  Labs drawn 01/08,   Start Cobenfy 50/20 mg BID for psychosis (note syntax error from note on 01/09, 50/20 mg is the correct dose)  Increase dose after 7 days to 100/20 mg. Continue this dose for a month then reevaluate   Continue Abilify 15 mg daily for psychosis as he is resistant to single antipsychotic  Decreased on 01/09/25  Continue Lexapro 20 mg daily for depression and anxiety  Continue Robinul 1 mg BID and 2 mg QHS for sialorrhea from Clozapine  Continue Atropine 1% solution 1 drop sublingually QHS for adverse effects of Clozapine  Continue Propanolol 10 mg BID for anxiety   Continue Melatonin 9 mg QHS for sleep  Continue Senna-Docusate sodium one 50 mg tablet QHS for constipation    Pt remains on dual antipsychotic Tx due to failure on monotherapy   .  S/p ECT treatments.  - ACT team referral was made for him living back with his aunt once MA funding comes through from the [facility] and sister will try to reapply

## 2025-01-10 NOTE — ASSESSMENT & PLAN NOTE
Reviewed on 01/10/25  Follows with medical  Continue famotidine 20 mg tablet BID per medical team   Continue glycopyrrolate 1 mg tablet BID AM and afternoon per medical team  Continue glycopyrrolate 2 mg tablet before bed per medical team  Continue pantoprazole EC 20 mg tablet every morning per medical team  This is a chronic condition, and is stable unless otherwise noted.

## 2025-01-10 NOTE — ASSESSMENT & PLAN NOTE
Reviewed on 1/10/25  Follows with medical team and given dietary counseling and need for exercise

## 2025-01-10 NOTE — ASSESSMENT & PLAN NOTE
Reviewed on 1/10/25  on nicotine gum as needed and encouraged smoking cessation  Continue to encourage cessation upon discharge

## 2025-01-10 NOTE — PROGRESS NOTES
01/10/25 0866   Team Meeting   Meeting Type Daily Rounds   Team Members Present   Team Members Present Physician;Nurse;;Other (Discipline and Name)   Physician Team Member Thomas,Holter   Nursing Team Member Claudia   Social Work Team Member Jose J ORTEGA   Other (Discipline and Name) Wagn PCM   Patient/Family Present   Patient Present No   Patient's Family Present No     Groups Participation  8/10.   Patient's compliant with medications. He's engaged in his treatment. Waiting to start new medication which patient is in agreement with. Family visit on 1/17/24

## 2025-01-11 PROCEDURE — 99232 SBSQ HOSP IP/OBS MODERATE 35: CPT | Performed by: PSYCHIATRY & NEUROLOGY

## 2025-01-11 RX ADMIN — NICOTINE POLACRILEX 2 MG: 2 GUM, CHEWING ORAL at 17:13

## 2025-01-11 RX ADMIN — PROPRANOLOL HYDROCHLORIDE 10 MG: 10 TABLET ORAL at 08:42

## 2025-01-11 RX ADMIN — GLYCOPYRROLATE 1 MG: 1 TABLET ORAL at 08:42

## 2025-01-11 RX ADMIN — ESCITALOPRAM OXALATE 20 MG: 10 TABLET, FILM COATED ORAL at 08:42

## 2025-01-11 RX ADMIN — AMLODIPINE BESYLATE 5 MG: 5 TABLET ORAL at 08:42

## 2025-01-11 RX ADMIN — MELATONIN TAB 3 MG 9 MG: 3 TAB at 21:06

## 2025-01-11 RX ADMIN — NICOTINE POLACRILEX 2 MG: 2 GUM, CHEWING ORAL at 12:51

## 2025-01-11 RX ADMIN — DOCUSATE SODIUM 100 MG: 100 CAPSULE, LIQUID FILLED ORAL at 17:03

## 2025-01-11 RX ADMIN — NICOTINE POLACRILEX 2 MG: 2 GUM, CHEWING ORAL at 21:26

## 2025-01-11 RX ADMIN — SENNOSIDES AND DOCUSATE SODIUM 2 TABLET: 50; 8.6 TABLET ORAL at 21:06

## 2025-01-11 RX ADMIN — DOCUSATE SODIUM 100 MG: 100 CAPSULE, LIQUID FILLED ORAL at 08:42

## 2025-01-11 RX ADMIN — PROPRANOLOL HYDROCHLORIDE 10 MG: 10 TABLET ORAL at 21:07

## 2025-01-11 RX ADMIN — GLYCOPYRROLATE 1 MG: 1 TABLET ORAL at 14:06

## 2025-01-11 RX ADMIN — PANTOPRAZOLE SODIUM 20 MG: 20 TABLET, DELAYED RELEASE ORAL at 08:42

## 2025-01-11 RX ADMIN — XANOMELINE AND TROSPIUM CHLORIDE 50 MG: 20; 50 CAPSULE, COATED PELLETS ORAL at 17:05

## 2025-01-11 RX ADMIN — CLOZAPINE 250 MG: 25 TABLET ORAL at 21:07

## 2025-01-11 RX ADMIN — HYDROCHLOROTHIAZIDE 12.5 MG: 12.5 TABLET ORAL at 08:42

## 2025-01-11 RX ADMIN — MULTIPLE VITAMINS W/ MINERALS TAB 1 TABLET: TAB ORAL at 08:42

## 2025-01-11 RX ADMIN — LORATADINE 10 MG: 10 TABLET ORAL at 08:42

## 2025-01-11 RX ADMIN — XANOMELINE AND TROSPIUM CHLORIDE 50 MG: 20; 50 CAPSULE, COATED PELLETS ORAL at 06:23

## 2025-01-11 NOTE — PROGRESS NOTES
Progress Note - Behavioral Health     Alberto Berumen 29 y.o. male MRN: [id number]   [facility] Encounter: [id number]  Assessment & Plan  Schizoaffective disorder, bipolar type (HCC)  Reviewed on 01/11/25  No significant changes still with same threatening voices as before not commanding and planning to have a virtual visit with his sisters  Continue medication as follows:  Continue Clozapine 250 mg QHS for psychosis - increased 12/11/24  To consider further careful titration  CBC w/diff and CK every 2 weeks  CK was increased to 960 on 12/12 and oral hydration encouraged, CK improved to 905 on 12/13/24. Repeat on 12/17/24 decreased to 674 and on 12/20/24 decreased to 448  Labs  drawn on 12/24/2024, repeat  trending down  Labs drawn 01/07, ANC 4.01  Labs drawn 01/08,   Continue Cobenfy 50/20 mg BID for psychosis (note syntax error from note on 01/09, 50/20 mg is the correct dose)  Increase dose after 7 days to 100/20 mg. Continue this dose for a month then reevaluate   Abilify D/ferny  Continue Lexapro 20 mg daily for depression and anxiety  Continue Robinul 1 mg BID and 2 mg QHS for sialorrhea from Clozapine  Continue Atropine 1% solution 1 drop sublingually QHS for adverse effects of Clozapine  Continue Propanolol 10 mg BID for anxiety   Continue Melatonin 9 mg QHS for sleep  Continue Senna-Docusate sodium one 50 mg tablet QHS for constipation    Pt remains on dual antipsychotic Tx due to failure on monotherapy   .  S/p ECT treatments.  - ACT team referral was made for him living back with his aunt once MA funding comes through from the [facility] and sister will try to reapply    Chronic idiopathic constipation  Reviewed on 01/11/25  Follows with medical team  Continue senna-docusate sodium dose per medical team.     GERD (gastroesophageal reflux disease)  Reviewed on 01/11/25  Follows with medical  Continue famotidine 20 mg tablet BID per medical team   Continue glycopyrrolate 1  mg tablet BID AM and afternoon per medical team  Continue glycopyrrolate 2 mg tablet before bed per medical team  Continue pantoprazole EC 20 mg tablet every morning per medical team  This is a chronic condition, and is stable unless otherwise noted.    Tobacco abuse   Reviewed on 1/11/25  on nicotine gum as needed and encouraged smoking cessation  Continue to encourage cessation upon discharge    Elevated hemoglobin A1c  Reviewed on 1/11/25  Follows with medical team    Class 2 obesity in adult  Reviewed on 1/11/25  Follows with medical team and given dietary counseling and need for exercise    Primary hypertension  Reviewed on 1/11/25  Follows with medical team on hydrochlorothiazide and Norvasc           Recommended Treatment:     Planned medication and treatment changes:    All current active medications have been reviewed  Encourage group therapy, milieu therapy and occupational therapy  Behavioral Health checks for safety monitoring      Current medications:  Current Facility-Administered Medications   Medication Dose Route Frequency Provider Last Rate    acetaminophen  650 mg Oral Q4H PRN Jordan C Holter, DO      acetaminophen  650 mg Oral Q6H PRN HOLLI Lion      aluminum-magnesium hydroxide-simethicone  30 mL Oral Q4H PRN Jordan C Holter, DO      amLODIPine  5 mg Oral Daily HOLLI Lion      Artificial Tears  1 drop Both Eyes Q3H PRN Jordan C Holter, DO      atropine  1 drop Sublingual HS Harjeet Torres, DO      haloperidol lactate  2.5 mg Intramuscular Q4H PRN Max 4/day HOLLI Lion      And    LORazepam  1 mg Intramuscular Q4H PRN Max 4/day HOLLI Lion      And    benztropine  0.5 mg Intramuscular Q4H PRN Max 4/day HOLLI Lion      benztropine  1 mg Intramuscular Q4H PRN Max 6/day Jordan C Holter, DO      haloperidol lactate  5 mg Intramuscular Q4H PRN Max 4/day HOLLI Lion      And    LORazepam  2 mg Intramuscular Q4H PRN Max 4/day HOLLI Lion      And     benztropine  1 mg Intramuscular Q4H PRN Max 4/day HOLLI Lion      benztropine  1 mg Oral Q4H PRN Max 6/day HOLLI Lion      benztropine  1 mg Oral Q4H PRN Max 6/day Ion FISCHER Holter, DO      bisacodyl  10 mg Rectal Daily PRN HOLLI Lion      calcium carbonate  500 mg Oral BID PRN Eileen Jensen, HOLLI      cloZAPine  250 mg Oral HS Bora Rosario MD      hydrOXYzine HCL  50 mg Oral Q6H PRN Max 4/day Ion FISCHER Holter, DO      Or    diphenhydrAMINE  50 mg Intramuscular Q6H PRN Ion FISCHER Holter, DO      hydrOXYzine HCL  50 mg Oral Q6H PRN Max 4/day HOLLI Lion      Or    diphenhydrAMINE  50 mg Intramuscular Q6H PRN HOLLI Lion      diphenhydrAMINE-zinc acetate   Topical BID PRN HOLLI Lion      docusate sodium  100 mg Oral BID Jourdan Dickens,       escitalopram  20 mg Oral Daily HOLLI Lion      famotidine  20 mg Oral BID PRN HOLLI Monge      fluticasone  1 spray Each Nare Daily PRN HOLLI Monge      glycopyrrolate  1 mg Oral BID (AM & Afternoon) Bora Rosario MD      glycopyrrolate  2 mg Oral HS Bora Rosario MD      haloperidol  1 mg Oral Q6H PRN HOLLI Lion      haloperidol  2.5 mg Oral Q4H PRN Max 4/day HOLLI Lion      haloperidol  5 mg Oral Q4H PRN Max 4/day HOLLI Lion      hydroCHLOROthiazide  12.5 mg Oral Daily Pia Mantilla, HOLLI      hydrocortisone   Topical 4x Daily PRN HOLLI Lion      hydrOXYzine HCL  100 mg Oral Q6H PRN Max 4/day Ion FISCHER Holter, DO      Or    LORazepam  2 mg Intramuscular Q6H PRN Ion FISCHER Holter, DO      hydrOXYzine HCL  100 mg Oral Q6H PRN Max 4/day HOLLI Lion      Or    LORazepam  2 mg Intramuscular Q6H PRN HOLLI Lion      hydrOXYzine HCL  25 mg Oral Q6H PRN Max 4/day Jordan C Holter, DO      ibuprofen  600 mg Oral Q8H PRN HOLLI Lion      influenza vaccine  0.5 mL Intramuscular Once Bora Rosario MD      loratadine  10 mg Oral Daily  "Brina Guillen MD      magnesium hydroxide  30 mL Oral Once Jourdan Dickens, DO      melatonin  3 mg Oral HS PRN Bora Rosario MD      melatonin  9 mg Oral HS Bora Rosario MD      methocarbamol  500 mg Oral Q6H PRN HOLLI Lion      multivitamin-minerals  1 tablet Oral Daily Eileen Jensen, HOLLI      nicotine polacrilex  2 mg Oral Q4H PRN Bora Rosario MD      OLANZapine  5 mg Oral Q4H PRN Max 3/day [facility] Holter, DO      Or    OLANZapine  2.5 mg Intramuscular Q4H PRN Max 3/day [facility] Holter, DO      OLANZapine  5 mg Oral Q3H PRN Max 3/day [facility] Holter, DO      Or    OLANZapine  5 mg Intramuscular Q3H PRN Max 3/day [facility] Holter, DO      OLANZapine  2.5 mg Oral Q4H PRN Max 6/day [facility] Holter, DO      ondansetron  4 mg Oral Q6H PRN HOLLI Lion      pantoprazole  20 [facility]      polyethylene glycol  17 g Oral Daily PRN Sofi Yoo, HOLLI      polyethylene glycol  17 g Oral Daily Jourdan Dickens, DO      propranolol  10 mg Oral Q12H KAYLIE HOLLI Lion      senna-docusate sodium  1 tablet Oral Daily PRN [facility] Holter, DO      senna-docusate sodium  2 tablet Oral HS Jourdan Dickens, DO      traZODone  50 mg Oral HS PRN HOLLI Lion      white petrolatum-mineral oil   Topical TID PRN HOLLI Lion      [START ON 1/17/2025] Xanomeline-Trospium Chloride  1 capsule Oral BID AMANDA Rosario MD      Xanomeline-Trospium Chloride  50 mg Oral BID  Bora Rosario MD         Risks / Benefits of Treatment:    Risks, benefits, and possible side effects of medications explained to patient and patient verbalizes understanding and agreement for treatment.    Subjective:    Behavior over the last 24 hours: unchanged.     Alebrto was seen today in follow-up for continuation of care. Per staff, no acute complaints overnight.  Alberto states he is feeling, \"nothing good and nothing bad.\"  Otherwise alludes to feeling, \"the same.\"  Still admitting to periodic " "hallucinations which are, \"bothersome.\"  Recently started newer antipsychotic for augmentation which she has been tolerating.  Will continue to monitor BPs as they have been slightly elevated the past 2 days.  Denies any medical complaints today.  Alberto adamantly denies suicidal/homicidal ideation in addition to thoughts of self-injury. Alberto presently is contacting for safety on the unit and feels comfortable to confide in staff if thoughts arise.  Albetro has been complaint with medications and tolerating without any side effects.      Sleep: normal  Appetite: normal  Medication side effects: No   ROS: no complaints    Mental Status Evaluation:    Appearance:  age appropriate, casually dressed, marginal hygiene, laying in bed   Behavior:  guarded, superficially cooperative, minimally engaged   Speech:  normal rate, normal volume   Mood:  dysphoric   Affect:  blunted   Thought Process:  goal directed, linear   Associations: concrete associations   Thought Content:  negative thinking, ruminating thoughts   Perceptual Disturbances: auditory hallucinations   Risk Potential: Suicidal ideation - None at present  Homicidal ideation - None at present  Potential for aggression - No   Sensorium:  oriented to person, place, and time/date   Memory:  recent and remote memory grossly intact   Consciousness:  alert and awake   Attention/Concentration: attention span and concentration appear shorter than expected for age   Insight:  limited   Judgment: limited   Gait/Station: in bed   Motor Activity: no abnormal movements     Vital signs in last 24 hours:    Temp:  [98 °F (36.7 °C)-98.7 °F (37.1 °C)] 98.7 °F (37.1 °C)  HR:  [] 98  BP: (133-158)/(60-81) 158/60  Resp:  [18] 18  SpO2:  [98 %-99 %] 98 %    Laboratory results: I have personally reviewed all pertinent laboratory/tests results    Results from the past 24 hours: No results found for this or any previous visit (from the past 24 hours).  Most Recent Labs:   Lab " Results   Component Value Date    WBC 9.41 01/07/2025    RBC 5.49 01/07/2025    HGB 12.8 01/07/2025    HCT 43.0 01/07/2025     01/07/2025    RDW 14.1 01/07/2025    NEUTROABS 4.01 01/07/2025    SODIUM 140 10/16/2024    K 3.8 10/16/2024     10/16/2024    CO2 29 10/16/2024    BUN 9 10/16/2024    CREATININE 0.91 10/16/2024    GLUC 94 10/16/2024    CALCIUM 9.5 10/16/2024    AST 26 09/30/2024    ALT 42 09/30/2024    ALKPHOS 64 09/30/2024    TP 6.6 09/30/2024    ALB 3.8 09/30/2024    TBILI 0.47 09/30/2024    CHOLESTEROL 156 03/14/2024    HDL 44 03/14/2024    TRIG 133 03/14/2024    LDLCALC 85 03/14/2024    NONHDLC 112 03/14/2024    LITHIUM 0.61 01/09/2024    TNP0GMSFGVBX 1.062 11/15/2023    SYPHILISAB Non-reactive 11/18/2023    HGBA1C 5.0 04/01/2024    EAG 97 04/01/2024       Suicide/Homicide Risk Assessment:    Risk of Harm to Self:   Nursing Suicide Risk Assessment Last 24 hours: C-SSRS Risk (Since Last Contact)  Calculated C-SSRS Risk Score (Since Last Contact): No Risk Indicated    Risk of Harm to Others:  Nursing Homicide Risk Assessment: Violence Risk to Others: Denies within past 6 months    The following interventions are recommended: Behavioral Health checks for safety monitoring, continued hospitalization on locked unit    Progress Toward Goals: progressing    Counseling / Coordination of Care:    Total floor / unit time spent today 36 minutes. Greater than 50% of total time was spent with the patient and / or family counseling and / or coordination of care. A description of counseling / coordination of care:    Verónica Jo PA-C 01/11/25

## 2025-01-11 NOTE — ASSESSMENT & PLAN NOTE
Reviewed on 1/11/25  on nicotine gum as needed and encouraged smoking cessation  Continue to encourage cessation upon discharge

## 2025-01-11 NOTE — ASSESSMENT & PLAN NOTE
Reviewed on 01/11/25  No significant changes still with same threatening voices as before not commanding and planning to have a virtual visit with his sisters  Continue medication as follows:  Continue Clozapine 250 mg QHS for psychosis - increased 12/11/24  To consider further careful titration  CBC w/diff and CK every 2 weeks  CK was increased to 960 on 12/12 and oral hydration encouraged, CK improved to 905 on 12/13/24. Repeat on 12/17/24 decreased to 674 and on 12/20/24 decreased to 448  Labs  drawn on 12/24/2024, repeat  trending down  Labs drawn 01/07, ANC 4.01  Labs drawn 01/08,   Continue Cobenfy 50/20 mg BID for psychosis (note syntax error from note on 01/09, 50/20 mg is the correct dose)  Increase dose after 7 days to 100/20 mg. Continue this dose for a month then reevaluate   Abilify D/ferny  Continue Lexapro 20 mg daily for depression and anxiety  Continue Robinul 1 mg BID and 2 mg QHS for sialorrhea from Clozapine  Continue Atropine 1% solution 1 drop sublingually QHS for adverse effects of Clozapine  Continue Propanolol 10 mg BID for anxiety   Continue Melatonin 9 mg QHS for sleep  Continue Senna-Docusate sodium one 50 mg tablet QHS for constipation    Pt remains on dual antipsychotic Tx due to failure on monotherapy   .  S/p ECT treatments.  - ACT team referral was made for him living back with his aunt once MA funding comes through from the [facility] and sister will try to reapply

## 2025-01-11 NOTE — ASSESSMENT & PLAN NOTE
Reviewed on 1/11/25  Follows with medical team and given dietary counseling and need for exercise

## 2025-01-11 NOTE — ASSESSMENT & PLAN NOTE
Reviewed on 01/11/25  Follows with medical team  Continue senna-docusate sodium dose per medical team.

## 2025-01-11 NOTE — ASSESSMENT & PLAN NOTE
Reviewed on 01/11/25  Follows with medical  Continue famotidine 20 mg tablet BID per medical team   Continue glycopyrrolate 1 mg tablet BID AM and afternoon per medical team  Continue glycopyrrolate 2 mg tablet before bed per medical team  Continue pantoprazole EC 20 mg tablet every morning per medical team  This is a chronic condition, and is stable unless otherwise noted.

## 2025-01-11 NOTE — NURSING NOTE
Patient is visible and social in the milieu. He is compliant with medications. His blood pressure was elevated in the morning. Patient denied any physical side effects. His blood pressure returned to his baseline. Appetite is good.

## 2025-01-11 NOTE — PLAN OF CARE
Problem: Alteration in Thoughts and Perception  Goal: Treatment Goal: Gain control of psychotic behaviors/thinking, reduce/eliminate presenting symptoms and demonstrate improved reality functioning upon discharge  Outcome: Progressing  Goal: Refrain from acting on delusional thinking/internal stimuli  Description: Interventions:  - Monitor patient closely, per order   - Utilize least restrictive measures   - Set reasonable limits, give positive feedback for acceptable   - Administer medications as ordered and monitor of potential side effects  Outcome: Progressing  Goal: Agree to be compliant with medication regime, as prescribed and report medication side effects  Description: Interventions:  - Offer appropriate PRN medication and supervise ingestion; conduct AIMS, as needed   Outcome: Progressing  Goal: Recognize dysfunctional thoughts, communicate reality-based thoughts at the time of discharge  Description: Interventions:  - Provide medication and psycho-education to assist patient in compliance and developing insight into his/her illness   Outcome: Progressing     Problem: Ineffective Coping  Goal: Demonstrates healthy coping skills  Outcome: Progressing  Goal: Participates in unit activities  Description: Interventions:  - Provide therapeutic environment   - Provide required programming   - Redirect inappropriate behaviors   Outcome: Progressing  Goal: Patient/Family participate in treatment and DC plans  Description: Interventions:  - Provide therapeutic environment  Outcome: Progressing  Goal: Patient/Family verbalizes awareness of resources  Outcome: Progressing     Problem: Depression  Goal: Treatment Goal: Demonstrate behavioral control of depressive symptoms, verbalize feelings of improved mood/affect, and adopt new coping skills prior to discharge  Outcome: Progressing  Goal: Verbalize thoughts and feelings  Description: Interventions:  - Assess and re-assess patient's level of risk   - Engage patient  in 1:1 interactions, daily, for a minimum of 15 minutes   - Encourage patient to express feelings, fears, frustrations, hopes   Outcome: Progressing  Goal: Refrain from harming self  Description: Interventions:  - Monitor patient closely, per order   - Supervise medication ingestion, monitor effects and side effects   Outcome: Progressing  Goal: Refrain from isolation  Description: Interventions:  - Develop a trusting relationship   - Encourage socialization   Outcome: Progressing  Goal: Refrain from self-neglect  Outcome: Progressing     Problem: Anxiety  Goal: Anxiety is at manageable level  Description: Interventions:  - Assess and monitor patient's anxiety level.   - Monitor for signs and symptoms (heart palpitations, chest pain, shortness of breath, headaches, nausea, feeling jumpy, restlessness, irritable, apprehensive).   - Collaborate with interdisciplinary team and initiate plan and interventions as ordered.  - [place] patient to unit/surroundings  - Explain treatment plan  - Encourage participation in care  - Encourage verbalization of concerns/fears  - Identify coping mechanisms  - Assist in developing anxiety-reducing skills  - Administer/offer alternative therapies  - Limit or eliminate stimulants  Outcome: Progressing     Problem: Alteration in Orientation  Goal: Treatment Goal: Demonstrate a reduction of confusion and improved orientation to person, place, time and/or situation upon discharge, according to optimum baseline/ability  Outcome: Progressing  Goal: Interact with staff daily  Description: Interventions:  - Assess and re-assess patient's level of orientation  - Engage patient in 1 on 1 interactions, daily, for a minimum of 15 minutes   - Establish rapport/trust with patient   Outcome: Progressing  Goal: Cooperate with recommended testing/procedures  Description: Interventions:  - Determine need for ancillary testing  - Observe for mental status changes  - Implement falls/precaution protocol    Outcome: Progressing  Goal: Complete daily ADLs, including personal hygiene independently, as able  Description: Interventions:  - Observe, teach, and assist patient with ADLS  Outcome: Progressing     Problem: DISCHARGE PLANNING  Goal: Discharge to home with appropriate resources  Description: INTERVENTIONS:  - Identify barriers to discharge w/patient and caregiver  - Arrange for needed discharge resources and transportation as appropriate  - Identify discharge learning needs (meds, wound care, etc.)  - Refer to Case Management Department for coordinating discharge planning if the patient needs post-hospital services based on physician/advanced practitioner order or complex needs related to functional status, cognitive ability, or social support system  Outcome: Progressing

## 2025-01-11 NOTE — NURSING NOTE
Alberto reported feeling depressed and anxious.  Reported AH of voices wanted to harm himself and his family.  Visible, pleasant and social.  Med and meal compliant.  Nicotine gum x 2 on this shift.  No unmet needs.

## 2025-01-12 PROCEDURE — 99232 SBSQ HOSP IP/OBS MODERATE 35: CPT | Performed by: PSYCHIATRY & NEUROLOGY

## 2025-01-12 RX ADMIN — MELATONIN TAB 3 MG 9 MG: 3 TAB at 21:06

## 2025-01-12 RX ADMIN — NICOTINE POLACRILEX 2 MG: 2 GUM, CHEWING ORAL at 17:08

## 2025-01-12 RX ADMIN — SENNOSIDES AND DOCUSATE SODIUM 2 TABLET: 50; 8.6 TABLET ORAL at 21:05

## 2025-01-12 RX ADMIN — GLYCOPYRROLATE 1 MG: 1 TABLET ORAL at 08:54

## 2025-01-12 RX ADMIN — PROPRANOLOL HYDROCHLORIDE 10 MG: 10 TABLET ORAL at 08:54

## 2025-01-12 RX ADMIN — NICOTINE POLACRILEX 2 MG: 2 GUM, CHEWING ORAL at 12:34

## 2025-01-12 RX ADMIN — DOCUSATE SODIUM 100 MG: 100 CAPSULE, LIQUID FILLED ORAL at 08:54

## 2025-01-12 RX ADMIN — PROPRANOLOL HYDROCHLORIDE 10 MG: 10 TABLET ORAL at 21:05

## 2025-01-12 RX ADMIN — XANOMELINE AND TROSPIUM CHLORIDE 50 MG: 20; 50 CAPSULE, COATED PELLETS ORAL at 17:09

## 2025-01-12 RX ADMIN — LORATADINE 10 MG: 10 TABLET ORAL at 08:54

## 2025-01-12 RX ADMIN — DOCUSATE SODIUM 100 MG: 100 CAPSULE, LIQUID FILLED ORAL at 17:08

## 2025-01-12 RX ADMIN — PANTOPRAZOLE SODIUM 20 MG: 20 TABLET, DELAYED RELEASE ORAL at 08:54

## 2025-01-12 RX ADMIN — MULTIPLE VITAMINS W/ MINERALS TAB 1 TABLET: TAB ORAL at 08:54

## 2025-01-12 RX ADMIN — HYDROCHLOROTHIAZIDE 12.5 MG: 12.5 TABLET ORAL at 08:53

## 2025-01-12 RX ADMIN — NICOTINE POLACRILEX 2 MG: 2 GUM, CHEWING ORAL at 21:05

## 2025-01-12 RX ADMIN — ESCITALOPRAM OXALATE 20 MG: 10 TABLET, FILM COATED ORAL at 08:54

## 2025-01-12 RX ADMIN — CLOZAPINE 250 MG: 25 TABLET ORAL at 21:05

## 2025-01-12 RX ADMIN — XANOMELINE AND TROSPIUM CHLORIDE 50 MG: 20; 50 CAPSULE, COATED PELLETS ORAL at 06:12

## 2025-01-12 NOTE — ASSESSMENT & PLAN NOTE
Reviewed on 01/12/25  Follows with medical  Continue famotidine 20 mg tablet BID per medical team   Continue glycopyrrolate 1 mg tablet BID AM and afternoon per medical team  Continue glycopyrrolate 2 mg tablet before bed per medical team  Continue pantoprazole EC 20 mg tablet every morning per medical team  This is a chronic condition, and is stable unless otherwise noted.

## 2025-01-12 NOTE — NURSING NOTE
Patient refused his Robinul at 1400. He stated that it is making his mouth dry even drinking water.

## 2025-01-12 NOTE — NURSING NOTE
Alberto maintained on ongoing SAFE precaution without incident on this shift. Awake, alert,pleasant and cooperative.   Attended and participated in 4 out of 9 groups today. Continues to be compliant with medication regimen, except the robinul and atropine gtts due to increase oral dryness.  Denies any pain or discomfort at site. Struggling with the same auditory hallucination ,utilizing proper coping skill.  Interacts well with his peers.  Behavior control.

## 2025-01-12 NOTE — PLAN OF CARE
Problem: Alteration in Thoughts and Perception  Goal: Agree to be compliant with medication regime, as prescribed and report medication side effects  Description: Interventions:  - Offer appropriate PRN medication and supervise ingestion; conduct AIMS, as needed   Outcome: Progressing     Problem: Ineffective Coping  Goal: Identifies ineffective coping skills  Outcome: Progressing  Goal: Identifies healthy coping skills  Outcome: Progressing  Goal: Participates in unit activities  Description: Interventions:  - Provide therapeutic environment   - Provide required programming   - Redirect inappropriate behaviors   Outcome: Progressing     Problem: Depression  Goal: Refrain from harming self  Description: Interventions:  - Monitor patient closely, per order   - Supervise medication ingestion, monitor effects and side effects   Outcome: Progressing  Goal: Refrain from isolation  Description: Interventions:  - Develop a trusting relationship   - Encourage socialization   Outcome: Progressing  Goal: Refrain from self-neglect  Outcome: Progressing     Problem: Alteration in Orientation  Goal: Interact with staff daily  Description: Interventions:  - Assess and re-assess patient's level of orientation  - Engage patient in 1 on 1 interactions, daily, for a minimum of 15 minutes   - Establish rapport/trust with patient   Outcome: Progressing  Goal: Allow medical examinations, as recommended  Description: Interventions:  - Provide physical/neurological exams and/or referrals, per provider   Outcome: Progressing  Goal: Cooperate with recommended testing/procedures  Description: Interventions:  - Determine need for ancillary testing  - Observe for mental status changes  - Implement falls/precaution protocol   Outcome: Progressing     Problem: Alteration in Thoughts and Perception  Goal: Treatment Goal: Gain control of psychotic behaviors/thinking, reduce/eliminate presenting symptoms and demonstrate improved reality  functioning upon discharge  Outcome: Not Progressing  Goal: Refrain from acting on delusional thinking/internal stimuli  Description: Interventions:  - Monitor patient closely, per order   - Utilize least restrictive measures   - Set reasonable limits, give positive feedback for acceptable   - Administer medications as ordered and monitor of potential side effects  Outcome: Not Progressing  Goal: Recognize dysfunctional thoughts, communicate reality-based thoughts at the time of discharge  Description: Interventions:  - Provide medication and psycho-education to assist patient in compliance and developing insight into his/her illness   Outcome: Not Progressing     Problem: Depression  Goal: Treatment Goal: Demonstrate behavioral control of depressive symptoms, verbalize feelings of improved mood/affect, and adopt new coping skills prior to discharge  Outcome: Not Progressing

## 2025-01-12 NOTE — ASSESSMENT & PLAN NOTE
Reviewed on 1/12/25  on nicotine gum as needed and encouraged smoking cessation  Continue to encourage cessation upon discharge

## 2025-01-12 NOTE — NURSING NOTE
"Patient is pleasant and cooperative. He is social and interactive with peers and staff. He refused his miralax this am. He states he has auditory hallucinations. He stated that \"the new medication has not helped yet.\" Patient educated that the medication needs time to work. His appetite is good.   "

## 2025-01-12 NOTE — ASSESSMENT & PLAN NOTE
Reviewed on 01/12/25  Follows with medical team  Continue senna-docusate sodium dose per medical team.

## 2025-01-12 NOTE — ASSESSMENT & PLAN NOTE
Reviewed on 01/12/25  No significant changes still with same threatening voices as before not commanding and planning to have a virtual visit with his sisters  Continue medication as follows:  Continue Clozapine 250 mg QHS for psychosis - increased 12/11/24  To consider further careful titration  CBC w/diff and CK every 2 weeks  CK was increased to 960 on 12/12 and oral hydration encouraged, CK improved to 905 on 12/13/24. Repeat on 12/17/24 decreased to 674 and on 12/20/24 decreased to 448  Labs  drawn on 12/24/2024, repeat  trending down  Labs drawn 01/07, ANC 4.01  Labs drawn 01/08,   Continue Cobenfy 50/20 mg BID for psychosis (note syntax error from note on 01/09, 50/20 mg is the correct dose)  Increase dose after 7 days to 100/20 mg. Continue this dose for a month then reevaluate   Abilify D/ferny  Continue Lexapro 20 mg daily for depression and anxiety  Continue Robinul 1 mg BID and 2 mg QHS for sialorrhea from Clozapine  Continue Atropine 1% solution 1 drop sublingually QHS for adverse effects of Clozapine  Continue Propanolol 10 mg BID for anxiety   Continue Melatonin 9 mg QHS for sleep  Continue Senna-Docusate sodium one 50 mg tablet QHS for constipation    Pt remains on dual antipsychotic Tx due to failure on monotherapy   .  S/p ECT treatments.  - ACT team referral was made for him living back with his aunt once MA funding comes through from the [facility] and sister will try to reapply

## 2025-01-12 NOTE — NURSING NOTE
Alberto maintain on ongoing SAFE precaution without incident on this shift. Observed resting in bed, respiration even and unlabored. Continuous Q 15 minutes check implemented thru the night.   Took his morning medication with plenty of water.  No behavioral noted

## 2025-01-12 NOTE — ASSESSMENT & PLAN NOTE
Reviewed on 1/12/25  Follows with medical team and given dietary counseling and need for exercise

## 2025-01-12 NOTE — PROGRESS NOTES
Progress Note - Behavioral Health     Alberto Berumen 29 y.o. male MRN: [id number]   [facility] Encounter: [id number]  Assessment & Plan  Schizoaffective disorder, bipolar type (HCC)  Reviewed on 01/12/25  No significant changes still with same threatening voices as before not commanding and planning to have a virtual visit with his sisters  Continue medication as follows:  Continue Clozapine 250 mg QHS for psychosis - increased 12/11/24  To consider further careful titration  CBC w/diff and CK every 2 weeks  CK was increased to 960 on 12/12 and oral hydration encouraged, CK improved to 905 on 12/13/24. Repeat on 12/17/24 decreased to 674 and on 12/20/24 decreased to 448  Labs  drawn on 12/24/2024, repeat  trending down  Labs drawn 01/07, ANC 4.01  Labs drawn 01/08,   Continue Cobenfy 50/20 mg BID for psychosis (note syntax error from note on 01/09, 50/20 mg is the correct dose)  Increase dose after 7 days to 100/20 mg. Continue this dose for a month then reevaluate   Abilify D/ferny  Continue Lexapro 20 mg daily for depression and anxiety  Continue Robinul 1 mg BID and 2 mg QHS for sialorrhea from Clozapine  Continue Atropine 1% solution 1 drop sublingually QHS for adverse effects of Clozapine  Continue Propanolol 10 mg BID for anxiety   Continue Melatonin 9 mg QHS for sleep  Continue Senna-Docusate sodium one 50 mg tablet QHS for constipation    Pt remains on dual antipsychotic Tx due to failure on monotherapy   .  S/p ECT treatments.  - ACT team referral was made for him living back with his aunt once MA funding comes through from the [facility] and sister will try to reapply    Chronic idiopathic constipation  Reviewed on 01/12/25  Follows with medical team  Continue senna-docusate sodium dose per medical team.     GERD (gastroesophageal reflux disease)  Reviewed on 01/12/25  Follows with medical  Continue famotidine 20 mg tablet BID per medical team   Continue glycopyrrolate 1  mg tablet BID AM and afternoon per medical team  Continue glycopyrrolate 2 mg tablet before bed per medical team  Continue pantoprazole EC 20 mg tablet every morning per medical team  This is a chronic condition, and is stable unless otherwise noted.    Tobacco abuse   Reviewed on 1/12/25  on nicotine gum as needed and encouraged smoking cessation  Continue to encourage cessation upon discharge    Elevated hemoglobin A1c  Reviewed on 1/12/25  Follows with medical team    Class 2 obesity in adult  Reviewed on 1/12/25  Follows with medical team and given dietary counseling and need for exercise    Primary hypertension  Reviewed on 1/12/25  Follows with medical team on hydrochlorothiazide and Norvasc           Recommended Treatment:     Planned medication and treatment changes:    All current active medications have been reviewed  Encourage group therapy, milieu therapy and occupational therapy  Behavioral Health checks for safety monitoring      Current medications:  Current Facility-Administered Medications   Medication Dose Route Frequency Provider Last Rate    acetaminophen  650 mg Oral Q4H PRN Jordan C Holter, DO      acetaminophen  650 mg Oral Q6H PRN HOLLI Lion      aluminum-magnesium hydroxide-simethicone  30 mL Oral Q4H PRN Jordan C Holter, DO      amLODIPine  5 mg Oral Daily HOLLI Lion      Artificial Tears  1 drop Both Eyes Q3H PRN Jordan C Holter, DO      atropine  1 drop Sublingual HS Harjeet Torres, DO      haloperidol lactate  2.5 mg Intramuscular Q4H PRN Max 4/day HOLLI Lion      And    LORazepam  1 mg Intramuscular Q4H PRN Max 4/day HOLLI Lion      And    benztropine  0.5 mg Intramuscular Q4H PRN Max 4/day HOLLI Lion      benztropine  1 mg Intramuscular Q4H PRN Max 6/day Jordan C Holter, DO      haloperidol lactate  5 mg Intramuscular Q4H PRN Max 4/day HOLLI Lion      And    LORazepam  2 mg Intramuscular Q4H PRN Max 4/day HOLLI Lion      And     benztropine  1 mg Intramuscular Q4H PRN Max 4/day HOLLI Lion      benztropine  1 mg Oral Q4H PRN Max 6/day HOLLI Lion      benztropine  1 mg Oral Q4H PRN Max 6/day Ion FISCHER Holter, DO      bisacodyl  10 mg Rectal Daily PRN HOLLI Lion      calcium carbonate  500 mg Oral BID PRN Eileen Jensen, HOLLI      cloZAPine  250 mg Oral HS Bora Rosario MD      hydrOXYzine HCL  50 mg Oral Q6H PRN Max 4/day Ion FISCHER Holter, DO      Or    diphenhydrAMINE  50 mg Intramuscular Q6H PRN Ion FISCHER Holter, DO      hydrOXYzine HCL  50 mg Oral Q6H PRN Max 4/day HOLLI Lion      Or    diphenhydrAMINE  50 mg Intramuscular Q6H PRN HOLLI Lino      diphenhydrAMINE-zinc acetate   Topical BID PRN HOLLI Lion      docusate sodium  100 mg Oral BID Jourdan Dickens,       escitalopram  20 mg Oral Daily HOLLI Lion      famotidine  20 mg Oral BID PRN HOLLI Monge      fluticasone  1 spray Each Nare Daily PRN HOLLI Monge      glycopyrrolate  1 mg Oral BID (AM & Afternoon) Bora Rosario MD      glycopyrrolate  2 mg Oral HS Bora Rosario MD      haloperidol  1 mg Oral Q6H PRN HOLLI Lion      haloperidol  2.5 mg Oral Q4H PRN Max 4/day HOLLI Lion      haloperidol  5 mg Oral Q4H PRN Max 4/day HOLLI Lion      hydroCHLOROthiazide  12.5 mg Oral Daily Pia Mantilla, HOLLI      hydrocortisone   Topical 4x Daily PRN HOLLI Lion      hydrOXYzine HCL  100 mg Oral Q6H PRN Max 4/day Ion FISCHER Holter, DO      Or    LORazepam  2 mg Intramuscular Q6H PRN Ion FISCHER Holter, DO      hydrOXYzine HCL  100 mg Oral Q6H PRN Max 4/day HOLLI Lion      Or    LORazepam  2 mg Intramuscular Q6H PRN HOLLI Lion      hydrOXYzine HCL  25 mg Oral Q6H PRN Max 4/day Jordan C Holter, DO      ibuprofen  600 mg Oral Q8H PRN HOLLI Lion      influenza vaccine  0.5 mL Intramuscular Once Bora Rosario MD      loratadine  10 mg Oral Daily  Brina Guillen MD      magnesium hydroxide  30 mL Oral Once Jourdan Dickens, DO      melatonin  3 mg Oral HS PRN Bora Rosario MD      melatonin  9 mg Oral HS Bora Rosario MD      methocarbamol  500 mg Oral Q6H PRN HOLLI Lion      multivitamin-minerals  1 tablet Oral Daily Eileen Jensen, HOLLI      nicotine polacrilex  2 mg Oral Q4H PRN Bora Rosario MD      OLANZapine  5 mg Oral Q4H PRN Max 3/day [facility] Holter, DO      Or    OLANZapine  2.5 mg Intramuscular Q4H PRN Max 3/day [facility] Holter, DO      OLANZapine  5 mg Oral Q3H PRN Max 3/day [facility] Holter, DO      Or    OLANZapine  5 mg Intramuscular Q3H PRN Max 3/day [facility] Holter, DO      OLANZapine  2.5 mg Oral Q4H PRN Max 6/day [facility] Holter, DO      ondansetron  4 mg Oral Q6H PRN HOLLI Lion      [facility]      polyethylene glycol  17 g Oral Daily PRN HOLLI Ghotra      polyethylene glycol  17 g Oral Daily Jourdan Dickens, DO      propranolol  10 mg Oral Q12H KAYLIE HOLLI Lion      senna-docusate sodium  1 tablet Oral Daily PRN [facility] Holter, DO      senna-docusate sodium  2 tablet Oral HS Jourdan Dickens, DO      traZODone  50 mg Oral HS PRN HOLLI Lion      white petrolatum-mineral oil   Topical TID PRN HOLLI Lion      [START ON 1/17/2025] Xanomeline-Trospium Chloride  1 capsule Oral BID AMANDA Rosario MD      Xanomeline-Trospium Chloride  50 mg Oral BID  Bora Rosario MD         Risks / Benefits of Treatment:    Risks, benefits, and possible side effects of medications explained to patient and patient verbalizes understanding and agreement for treatment.    Subjective:    Behavior over the last 24 hours: unchanged.     Alberto was seen today in follow-up for continuation of care. Per staff, was complaining of dry mouth this morning. Otherwise BP's have been stable. No acute issues reported overnight.  Alberto is seen resting in bed comfortably. He reports  "to feeling tired this morning which he attributes to recent adjustment in medications. He denies any current change in AH, as they are still bothersome. They still often make reference about harming his family (not himself but voices). Alberto adamantly denies suicidal/homicidal ideation in addition to thoughts of self-injury. Alberto presently is contacting for safety on the unit and feels comfortable to confide in staff if thoughts arise. At time of interview, still reporting chronic AH demeaning in nature. Alberto has been complaint with medications and tolerating without any side effects.    Sleep: normal  Appetite: normal  Medication side effects: No   ROS: no complaints    Mental Status Evaluation:    Appearance:  age appropriate, casually dressed, marginal hygiene, laying in bed  covered in blankets   Behavior:  guarded, superficially cooperative, disengaged   Speech:  scant, soft, decreased volume   Mood:  depressed, \"tired\"   Affect:  blunted   Thought Process:  goal directed, linear   Associations: concrete associations   Thought Content:  negative thinking, ruminating thoughts   Perceptual Disturbances: auditory hallucinations which are demeaning in nature (the voices will hurt his family)   Risk Potential: Suicidal ideation - None at present  Homicidal ideation - None at present  Potential for aggression - No   Sensorium:  oriented to person, place, and time/date   Memory:  recent and remote memory grossly intact   Consciousness:  alert and awake   Attention/Concentration: attention span and concentration appear shorter than expected for age   Insight:  limited   Judgment: limited   Gait/Station: in bed   Motor Activity: no abnormal movements     Vital signs in last 24 hours:    Temp:  [97.2 °F (36.2 °C)-97.9 °F (36.6 °C)] 97.9 °F (36.6 °C)  HR:  [79-98] 79  BP: (113-121)/(58-76) 116/58  Resp:  [18] 18  SpO2:  [98 %] 98 %  O2 Device: None (Room air)    Laboratory results: I have personally reviewed all " pertinent laboratory/tests results    Results from the past 24 hours: No results found for this or any previous visit (from the past 24 hours).  Most Recent Labs:   Lab Results   Component Value Date    WBC 9.41 01/07/2025    RBC 5.49 01/07/2025    HGB 12.8 01/07/2025    HCT 43.0 01/07/2025     01/07/2025    RDW 14.1 01/07/2025    NEUTROABS 4.01 01/07/2025    SODIUM 140 10/16/2024    K 3.8 10/16/2024     10/16/2024    CO2 29 10/16/2024    BUN 9 10/16/2024    CREATININE 0.91 10/16/2024    GLUC 94 10/16/2024    CALCIUM 9.5 10/16/2024    AST 26 09/30/2024    ALT 42 09/30/2024    ALKPHOS 64 09/30/2024    TP 6.6 09/30/2024    ALB 3.8 09/30/2024    TBILI 0.47 09/30/2024    CHOLESTEROL 156 03/14/2024    HDL 44 03/14/2024    TRIG 133 03/14/2024    LDLCALC 85 03/14/2024    NONHDLC 112 03/14/2024    LITHIUM 0.61 01/09/2024    [id number] 1.062 11/15/2023    SYPHILISAB Non-reactive 11/18/2023    HGBA1C 5.0 04/01/2024    EAG 97 04/01/2024       Suicide/Homicide Risk Assessment:    Risk of Harm to Self:   Nursing Suicide Risk Assessment Last 24 hours: C-SSRS Risk (Since Last Contact)  Calculated C-SSRS Risk Score (Since Last Contact): No Risk Indicated    Risk of Harm to Others:  Nursing Homicide Risk Assessment: Violence Risk to Others: Denies within past 6 months    The following interventions are recommended: Behavioral Health checks for safety monitoring, continued hospitalization on locked unit    Progress Toward Goals: progressing    Counseling / Coordination of Care:    Total floor / unit time spent today 36 minutes. Greater than 50% of total time was spent with the patient and / or family counseling and / or coordination of care. A description of counseling / coordination of care:    Verónica Jo PA-C 01/12/25

## 2025-01-13 PROCEDURE — 99232 SBSQ HOSP IP/OBS MODERATE 35: CPT | Performed by: PSYCHIATRY & NEUROLOGY

## 2025-01-13 RX ORDER — ONDANSETRON 4 MG/1
4 TABLET, ORALLY DISINTEGRATING ORAL EVERY 6 HOURS PRN
Status: DISCONTINUED | OUTPATIENT
Start: 2025-01-13 | End: 2025-01-13

## 2025-01-13 RX ADMIN — MELATONIN TAB 3 MG 9 MG: 3 TAB at 21:12

## 2025-01-13 RX ADMIN — PANTOPRAZOLE SODIUM 20 MG: 20 TABLET, DELAYED RELEASE ORAL at 08:38

## 2025-01-13 RX ADMIN — PROPRANOLOL HYDROCHLORIDE 10 MG: 10 TABLET ORAL at 21:13

## 2025-01-13 RX ADMIN — HYDROCHLOROTHIAZIDE 12.5 MG: 12.5 TABLET ORAL at 08:39

## 2025-01-13 RX ADMIN — XANOMELINE AND TROSPIUM CHLORIDE 50 MG: 20; 50 CAPSULE, COATED PELLETS ORAL at 17:12

## 2025-01-13 RX ADMIN — NICOTINE POLACRILEX 2 MG: 2 GUM, CHEWING ORAL at 17:11

## 2025-01-13 RX ADMIN — GLYCOPYRROLATE 1 MG: 1 TABLET ORAL at 08:38

## 2025-01-13 RX ADMIN — DOCUSATE SODIUM 100 MG: 100 CAPSULE, LIQUID FILLED ORAL at 17:12

## 2025-01-13 RX ADMIN — XANOMELINE AND TROSPIUM CHLORIDE 50 MG: 20; 50 CAPSULE, COATED PELLETS ORAL at 06:05

## 2025-01-13 RX ADMIN — AMLODIPINE BESYLATE 5 MG: 5 TABLET ORAL at 08:38

## 2025-01-13 RX ADMIN — NICOTINE POLACRILEX 2 MG: 2 GUM, CHEWING ORAL at 21:13

## 2025-01-13 RX ADMIN — SENNOSIDES AND DOCUSATE SODIUM 2 TABLET: 50; 8.6 TABLET ORAL at 21:13

## 2025-01-13 RX ADMIN — MULTIPLE VITAMINS W/ MINERALS TAB 1 TABLET: TAB ORAL at 08:37

## 2025-01-13 RX ADMIN — CLOZAPINE 250 MG: 25 TABLET ORAL at 21:13

## 2025-01-13 RX ADMIN — NICOTINE POLACRILEX 2 MG: 2 GUM, CHEWING ORAL at 12:38

## 2025-01-13 RX ADMIN — LORATADINE 10 MG: 10 TABLET ORAL at 08:38

## 2025-01-13 RX ADMIN — DOCUSATE SODIUM 100 MG: 100 CAPSULE, LIQUID FILLED ORAL at 08:39

## 2025-01-13 RX ADMIN — NICOTINE POLACRILEX 2 MG: 2 GUM, CHEWING ORAL at 08:39

## 2025-01-13 RX ADMIN — PROPRANOLOL HYDROCHLORIDE 10 MG: 10 TABLET ORAL at 08:37

## 2025-01-13 RX ADMIN — ESCITALOPRAM OXALATE 20 MG: 10 TABLET, FILM COATED ORAL at 08:39

## 2025-01-13 NOTE — NURSING NOTE
"Pt is calm, cooperative and visible on milieu. Pt reports AH, depression and anxiety; denies SI/HI/VH. Pt states he dislikes the new medication he started. Pt reports nausea and that his throat \"was feeling weird\" this morning. Pt declined PRN when offered. Compliant with medications and meals. Social with staff and peers. Q 15 min checks maintained.   "

## 2025-01-13 NOTE — ASSESSMENT & PLAN NOTE
Reviewed on 01/13/25  Follows with medical team  Continue senna-docusate sodium dose per medical team.

## 2025-01-13 NOTE — ASSESSMENT & PLAN NOTE
Reviewed on 1/13/25  on nicotine gum as needed and encouraged smoking cessation  Continue to encourage cessation upon discharge

## 2025-01-13 NOTE — PROGRESS NOTES
Progress Note - Behavioral Health   Name: Alberto Berumen 29 y.o. male I MRN: [id number]  Unit/Bed#: [facility] 112-02 I Date of Admission: 3/29/2024   Date of Service: 1/13/2025 I Hospital Day: 290     Assessment & Plan  Schizoaffective disorder, bipolar type (HCC)  Reviewed on 01/13/25  No significant changes still with same threatening voices as before not commanding and planning to have a virtual visit with his sisters  Continue medication as follows:  Continue Clozapine 250 mg QHS for psychosis - increased 12/11/24  To consider further careful titration  CBC w/diff and CK every 2 weeks  CK was increased to 960 on 12/12 and oral hydration encouraged, CK improved to 905 on 12/13/24. Repeat on 12/17/24 decreased to 674 and on 12/20/24 decreased to 448  Labs  drawn on 12/24/2024, repeat  trending down  Labs drawn 01/07, ANC 4.01  Labs drawn 01/08,   Continue Cobenfy 50/20 mg BID for psychosis (note syntax error from note on 01/09, 50/20 mg is the correct dose)  Increase dose after 7 days to 100/20 mg. Continue this dose for a month then reevaluate   Abilify D/ferny  Continue Lexapro 20 mg daily for depression and anxiety  Robinul D/ferny on 01/13  Atropine D/ferny on 01/13  Continue Propanolol 10 mg BID for anxiety   Continue Melatonin 9 mg QHS for sleep  Continue Senna-Docusate sodium one 50 mg tablet QHS for constipation  Start Zofran 4 mg tablet Q6H PRN for nausea     Pt remains on dual antipsychotic Tx due to failure on monotherapy   .  S/p ECT treatments.  - ACT team referral was made for him living back with his aunt once MA funding comes through from the [facility] and sister will try to reapply    Chronic idiopathic constipation  Reviewed on 01/13/25  Follows with medical team  Continue senna-docusate sodium dose per medical team.     GERD (gastroesophageal reflux disease)  Reviewed on 01/13/25  Follows with medical  Continue famotidine 20 mg tablet BID per medical team   Continue glycopyrrolate  1 mg tablet BID AM and afternoon per medical team  Continue glycopyrrolate 2 mg tablet before bed per medical team  Continue pantoprazole EC 20 mg tablet every morning per medical team  This is a chronic condition, and is stable unless otherwise noted.    Tobacco abuse   Reviewed on 1/13/25  on nicotine gum as needed and encouraged smoking cessation  Continue to encourage cessation upon discharge    Elevated hemoglobin A1c  Reviewed on 1/13/25  Follows with medical team    Class 2 obesity in adult  Reviewed on 1/13/25  Follows with medical team and given dietary counseling and need for exercise    Primary hypertension  Reviewed on 1/13/25  Follows with medical team on hydrochlorothiazide and Norvasc        Psychiatry Progress Note [facility]    Progress towards goals: moderate progress    Review of systems: denied    Psychiatric Diagnosis: Schizoaffective disorder, bipolar type     Assessment  Overall Status:  progressing, no significant changes  Certification Statement: The patient will continue to require additional inpatient hospital stay due to psychotic related symptoms and limited response to medications and ECT.        Medications: as above  Side effects from treatment: dry mouth  Medication changes: as above  Medication education: Risks side effects benefits and precautions of medications discussed with patient and they did verbalize an understanding about risks for metabolic syndrome from being on neuroleptics and tardive dyskinesia etc.  All medications reviewed and I recommend that they be continued for symptom management  Understanding of medications: Risks side effects benefits and precautions of medications discussed with patient and they did verbalize an understanding about risks for metabolic syndrome from being on neuroleptics and tardive dyskinesia etc., and patient appeared to have understanding.  Justification for dual anti-psychotics: due to not responding to single  "antipsychotic    Non-pharmacological treatments  Continue with individual, group, milieu and occupational therapy using recovery principles and psycho-education about accepting illness and the need for treatment.  Behavioral health checks every 7 minutes    Safety  Safety and communication plan established to target dynamic risk factors discussed above.    Discharge Plan   To his Aunt with [facility] for ACT when received    Interval Progress:   Per treatment team, Alberto has been pleasant, visible, and cooperative. Since starting Cobenfy, he has been reporting severe dry mouth symptoms. Due to his dry mouth symptoms he refused robinul and atropine last night. There have been no behavior issues. He ate 100% of his meals yesterday. His last shower was on the 11th. He attended 8/9 groups on Friday.     Today, Alberto reports his mood as \"stable\". He said his goal for the day was to work out. He reported his sleep and his appetite as good. Alberto reported that since taking cobenfy this morning he has felt very nauseous and had a \"weird feeling\" in his throat that he did not know how to describe. He denied, fevers, chills, vomiting, diarrhea, swollen tongue or lips. Alberto endorsed continual auditory hallucinations of voices that are saying they are going to kill him. He denies command hallucinations and says the voices only involve him and not his family. He denied SI/HI. This morning Alberto reported that sometimes he wishes he was \"not on this earth\". He reports a severe dry mouth as a side effect from the Cobenfy.     Acceptance by patient: accepts inpatient treatment and medications  Hopefulness in recovery: to live with his aunt in the community  Involved in reintegration process: communicates with siblings and aunt   Trusting in relationship with psychiatrist: trusting  Sleep: good  Appetite: good  Compliance with Medications: compliant  Group attendance: 8/9  Significant events: none in the last 24 hours " "    Mental Status Exam  Appearance: age appropriate, casually dressed, laying in bed with a blanket over his face, adequate grooming  Behavior: calm, cooperative, good eye contact, removed blanket from face during conversation, easily aroused from sleep  Speech: normal rate, normal volume, normal pitch, clear, coherent, not pressured or hyper-verbal  Mood: \"stable\"  Affect: constricted, mood-congruent  Thought Process: organized, logical, coherent, goal directed, linear  Thought Content: mild paranoia, intrusive thoughts  Perceptual Disturbances:  auditory hallucinations of voices that say they are going to kill him. Denies command hallucinations. Denies involvement of family and says hallucinations only involve him. No visual hallucinations. Not observed responding to internal stimuli or appearing preoccupied.   Risk Potential: Suicidal ideations none, homicidal ideations none, and potential for aggression not present at this time.   Sensorium: alert and oriented to person, place, time and situation  Cognition: recent and remote memory grossly intact  Consciousness: alert and awake  Attention: attention span and concentration are age appropriate  Intellect: appears to be of average intelligence  Insight: intact  Judgement: intact  Motor Activity: no abnormal movements     Vitals  Temp:  [97.5 °F (36.4 °C)-98.4 °F (36.9 °C)] 97.5 °F (36.4 °C)  HR:  [94-99] 94  Resp:  [18] 18  BP: (127-161)/(65-94) 161/94  SpO2:  [97 %-100 %] 100 %  No intake or output data in the 24 hours ending 01/13/25 1006    Lab Results: All Labs For Current Hospital Admission Reviewed  Results from last 7 days   Lab Units 01/07/25  0729   WBC Thousand/uL 9.41   RBC Million/uL 5.49   HEMOGLOBIN g/dL 12.8   HEMATOCRIT % 43.0   MCV fL 78*   PLATELETS Thousands/uL 232   TOTAL NEUT ABS Thousands/µL 4.01       Current Facility-Administered Medications   Medication Dose Route Frequency Provider Last Rate    acetaminophen  650 mg Oral Q4H PRN Ion FISCHER" Holter, DO      acetaminophen  650 mg Oral Q6H PRN HOLLI Lion      aluminum-magnesium hydroxide-simethicone  30 mL Oral Q4H PRN Jordan C Holter, DO      amLODIPine  5 mg Oral Daily HOLLI Lion      Artificial Tears  1 drop Both Eyes Q3H PRN Jordan C Holter,       haloperidol lactate  2.5 mg Intramuscular Q4H PRN Max 4/day HOLLI Lion      And    LORazepam  1 mg Intramuscular Q4H PRN Max 4/day HOLLI Lion      And    benztropine  0.5 mg Intramuscular Q4H PRN Max 4/day HOLLI Lion      benztropine  1 mg Intramuscular Q4H PRN Max 6/day Jordan C Holter, DO      haloperidol lactate  5 mg Intramuscular Q4H PRN Max 4/day HOLLI Lion      And    LORazepam  2 mg Intramuscular Q4H PRN Max 4/day HOLLI Lion      And    benztropine  1 mg Intramuscular Q4H PRN Max 4/day HOLLI Lion      benztropine  1 mg Oral Q4H PRN Max 6/day HOLLI Lion      benztropine  1 mg Oral Q4H PRN Max 6/day Jordan C Holter, DO      bisacodyl  10 mg Rectal Daily PRN HOLLI Lion      calcium carbonate  500 mg Oral BID PRN HOLLI Monge      cloZAPine  250 mg Oral HS Bora Rosario MD      hydrOXYzine HCL  50 mg Oral Q6H PRN Max 4/day Jordan C Holter, DO      Or    diphenhydrAMINE  50 mg Intramuscular Q6H PRN Jordan C Holter, DO      hydrOXYzine HCL  50 mg Oral Q6H PRN Max 4/day HOLLI Lion      Or    diphenhydrAMINE  50 mg Intramuscular Q6H PRN HOLLI Lion      diphenhydrAMINE-zinc acetate   Topical BID PRN HOLLI Lion      docusate sodium  100 mg Oral BID Jourdan Dickens, DO      escitalopram  20 mg Oral Daily HOLLI Lion      famotidine  20 mg Oral BID PRN HOLLI Monge      fluticasone  1 spray Each Nare Daily PRN HOLLI Monge      haloperidol  1 mg Oral Q6H PRN HOLLI Lion      haloperidol  2.5 mg Oral Q4H PRN Max 4/day HOLLI Lion      haloperidol  5 mg Oral Q4H PRN Max 4/day Eveline Hunt,  HOLLI      hydroCHLOROthiazide  12.5 mg Oral Daily Pia Mantilla, HOLLI      hydrocortisone   Topical 4x Daily PRN HOLLI Lion      hydrOXYzine HCL  100 mg Oral Q6H PRN Max 4/day [facility] Holter, DO      Or    LORazepam  2 mg Intramuscular Q6H PRN [facility] [address] Holter, DO      hydrOXYzine HCL  100 mg Oral Q6H PRN Max 4/day HOLLI Lion      Or    LORazepam  2 mg Intramuscular Q6H PRN HOLLI Lion      hydrOXYzine HCL  25 mg Oral Q6H PRN Max 4/day [facility] [address] Holter, DO      ibuprofen  600 mg Oral Q8H PRN HOLLI Lion      influenza vaccine  0.5 mL Intramuscular Once Bora Rosario MD      loratadine  10 mg Oral Daily Brina Guillen MD      magnesium hydroxide  30 mL Oral Once Jourdan Dickens, DO      melatonin  3 mg Oral HS PRN Bora Rosario MD      melatonin  9 mg Oral HS Bora Rosario MD      methocarbamol  500 mg Oral Q6H PRN HOLLI Lion      multivitamin-minerals  1 tablet Oral Daily Eileen Jensen, HOLLI      nicotine polacrilex  2 mg Oral Q4H PRN Bora Rosario MD      OLANZapine  5 mg Oral Q4H PRN Max 3/day [facility] [address] Holter, DO      Or    OLANZapine  2.5 mg Intramuscular Q4H PRN Max 3/day [facility] [address] Holter, DO      OLANZapine  5 mg Oral Q3H PRN Max 3/day [facility] [address] Holter, DO      Or    OLANZapine  5 mg Intramuscular Q3H PRN Max 3/day [facility] [address] Holter, DO      OLANZapine  2.5 mg Oral Q4H PRN Max 6/day [facility] [address] Holter, DO      ondansetron  4 mg Oral Q6H PRN HOLLI Lion      pantoprazole  [facility]      polyethylene glycol  17 g Oral Daily PRN HOLLI Ghotra      polyethylene glycol  17 g Oral Daily Jourdan Dickens, DO      propranolol  10 mg Oral Q12H KAYLIE HOLLI Lion      senna-docusate sodium  1 tablet Oral Daily PRN [facility] [address] Holter, DO      senna-docusate sodium  2 tablet Oral HS Jourdan Dickens, DO      traZODone  50 mg Oral HS PRN HOLLI Lion      white petrolatum-mineral oil   Topical TID PRN HOLLI Lion      [START ON  1/17/2025] Xanomeline-Trospium Chloride  1 capsule Oral BID AMANDA Rosario MD      Xanomeline-Trospium Chloride  50 mg Oral BID AMANDA Rosario MD         Counseling / Coordination of Care: Total floor / unit time spent today 15 minutes. Greater than 50% of total time was spent with the patient and / or family counseling and / or somewhat receptive to supportive listening and teaching positive coping skills to deal with symptom mangement.     Patient's Rights, confidentiality and exceptions to confidentiality, use of automated medical record, Behavioral Health Services staff access to medical record, and consent to treatment reviewed.    This note has been dictated and hence there may be problems with punctuation, spelling and formatting and if anyone has any concerns please address them to Dr. Rosario.  This note is not shared with patient due to potential for making patient's condition worse by knowing the content of the note.    Written by Leidy WALLACE and supervised by Dr. Rosario

## 2025-01-13 NOTE — NURSING NOTE
Alberto maintained on ongoing SAFE precaution without incident on this shift. Awake, alert,pleasant and cooperative.   Attended and participated in 3 out of 7 groups today. Continues to be compliant with medication regimen, except the robinul and atropine gtts due to increase oral dryness.  [place] return to [facility] for credit. Denies any pain or discomfort at site. Struggling with the same auditory hallucination,  utilizing proper coping skill.  Interacts well with his peers.  Behavior control.

## 2025-01-13 NOTE — PROGRESS NOTES
01/13/25 0804   Team Meeting   Meeting Type Daily Rounds   Team Members Present   Team Members Present Physician;Nurse;;Other (Discipline and Name)   Physician Team Member Abraham   Nursing Team Member Judah Connolly   Social Work Team Member Jose J ORTEGA   Other (Discipline and Name) Wang PCM   Patient/Family Present   Patient Present No   Patient's Family Present No     Groups Participation  8/9.   He's compliant with medications. Waiting for [facility] opening to do his intake. He's engaged in his treatment. Reports AH. Started new Cobenfy

## 2025-01-13 NOTE — ASSESSMENT & PLAN NOTE
Reviewed on 1/13/25  Follows with medical team and given dietary counseling and need for exercise

## 2025-01-13 NOTE — ASSESSMENT & PLAN NOTE
Reviewed on 01/13/25  Follows with medical  Continue famotidine 20 mg tablet BID per medical team   Continue glycopyrrolate 1 mg tablet BID AM and afternoon per medical team  Continue glycopyrrolate 2 mg tablet before bed per medical team  Continue pantoprazole EC 20 mg tablet every morning per medical team  This is a chronic condition, and is stable unless otherwise noted.

## 2025-01-13 NOTE — PLAN OF CARE
Problem: Alteration in Thoughts and Perception  Goal: Agree to be compliant with medication regime, as prescribed and report medication side effects  Description: Interventions:  - Offer appropriate PRN medication and supervise ingestion; conduct AIMS, as needed   Outcome: Progressing     Problem: Ineffective Coping  Goal: Participates in unit activities  Description: Interventions:  - Provide therapeutic environment   - Provide required programming   - Redirect inappropriate behaviors   Outcome: Progressing     Problem: Depression  Goal: Refrain from harming self  Description: Interventions:  - Monitor patient closely, per order   - Supervise medication ingestion, monitor effects and side effects   Outcome: Progressing  Goal: Refrain from isolation  Description: Interventions:  - Develop a trusting relationship   - Encourage socialization   Outcome: Progressing     Problem: Anxiety  Goal: Anxiety is at manageable level  Description: Interventions:  - Assess and monitor patient's anxiety level.   - Monitor for signs and symptoms (heart palpitations, chest pain, shortness of breath, headaches, nausea, feeling jumpy, restlessness, irritable, apprehensive).   - Collaborate with interdisciplinary team and initiate plan and interventions as ordered.  - Allison patient to unit/surroundings  - Explain treatment plan  - Encourage participation in care  - Encourage verbalization of concerns/fears  - Identify coping mechanisms  - Assist in developing anxiety-reducing skills  - Administer/offer alternative therapies  - Limit or eliminate stimulants  Outcome: Progressing     Problem: Alteration in Orientation  Goal: Interact with staff daily  Description: Interventions:  - Assess and re-assess patient's level of orientation  - Engage patient in 1 on 1 interactions, daily, for a minimum of 15 minutes   - Establish rapport/trust with patient   Outcome: Progressing     Problem: Alteration in Thoughts and Perception  Goal:  Treatment Goal: Gain control of psychotic behaviors/thinking, reduce/eliminate presenting symptoms and demonstrate improved reality functioning upon discharge  Outcome: Not Progressing  Goal: Refrain from acting on delusional thinking/internal stimuli  Description: Interventions:  - Monitor patient closely, per order   - Utilize least restrictive measures   - Set reasonable limits, give positive feedback for acceptable   - Administer medications as ordered and monitor of potential side effects  Outcome: Not Progressing

## 2025-01-13 NOTE — ASSESSMENT & PLAN NOTE
Reviewed on 01/13/25  No significant changes still with same threatening voices as before not commanding and planning to have a virtual visit with his sisters  Continue medication as follows:  Continue Clozapine 250 mg QHS for psychosis - increased 12/11/24  To consider further careful titration  CBC w/diff and CK every 2 weeks  CK was increased to 960 on 12/12 and oral hydration encouraged, CK improved to 905 on 12/13/24. Repeat on 12/17/24 decreased to 674 and on 12/20/24 decreased to 448  Labs  drawn on 12/24/2024, repeat  trending down  Labs drawn 01/07, ANC 4.01  Labs drawn 01/08,   Continue Cobenfy 50/20 mg BID for psychosis (note syntax error from note on 01/09, 50/20 mg is the correct dose)  Increase dose after 7 days to 100/20 mg. Continue this dose for a month then reevaluate   Abilify D/ferny  Continue Lexapro 20 mg daily for depression and anxiety  Robinul D/ferny on 01/13  Atropine D/ferny on 01/13  Continue Propanolol 10 mg BID for anxiety   Continue Melatonin 9 mg QHS for sleep  Continue Senna-Docusate sodium one 50 mg tablet QHS for constipation  Start Zofran 4 mg tablet Q6H PRN for nausea     Pt remains on dual antipsychotic Tx due to failure on monotherapy   .  S/p ECT treatments.  - ACT team referral was made for him living back with his aunt once MA funding comes through from the [facility] and sister will try to reapply

## 2025-01-14 PROCEDURE — 99232 SBSQ HOSP IP/OBS MODERATE 35: CPT

## 2025-01-14 RX ADMIN — ESCITALOPRAM OXALATE 20 MG: 10 TABLET, FILM COATED ORAL at 09:00

## 2025-01-14 RX ADMIN — NICOTINE POLACRILEX 2 MG: 2 GUM, CHEWING ORAL at 17:25

## 2025-01-14 RX ADMIN — SENNOSIDES AND DOCUSATE SODIUM 2 TABLET: 50; 8.6 TABLET ORAL at 21:12

## 2025-01-14 RX ADMIN — PROPRANOLOL HYDROCHLORIDE 10 MG: 10 TABLET ORAL at 08:58

## 2025-01-14 RX ADMIN — XANOMELINE AND TROSPIUM CHLORIDE 50 MG: 20; 50 CAPSULE, COATED PELLETS ORAL at 06:10

## 2025-01-14 RX ADMIN — LORATADINE 10 MG: 10 TABLET ORAL at 08:59

## 2025-01-14 RX ADMIN — NICOTINE POLACRILEX 2 MG: 2 GUM, CHEWING ORAL at 09:00

## 2025-01-14 RX ADMIN — DOCUSATE SODIUM 100 MG: 100 CAPSULE, LIQUID FILLED ORAL at 16:53

## 2025-01-14 RX ADMIN — BENZTROPINE MESYLATE 1 MG: 1 TABLET ORAL at 10:48

## 2025-01-14 RX ADMIN — MELATONIN TAB 3 MG 9 MG: 3 TAB at 21:12

## 2025-01-14 RX ADMIN — NICOTINE POLACRILEX 2 MG: 2 GUM, CHEWING ORAL at 13:00

## 2025-01-14 RX ADMIN — NICOTINE POLACRILEX 2 MG: 2 GUM, CHEWING ORAL at 21:12

## 2025-01-14 RX ADMIN — PROPRANOLOL HYDROCHLORIDE 10 MG: 10 TABLET ORAL at 21:12

## 2025-01-14 RX ADMIN — MULTIPLE VITAMINS W/ MINERALS TAB 1 TABLET: TAB ORAL at 08:59

## 2025-01-14 RX ADMIN — XANOMELINE AND TROSPIUM CHLORIDE 50 MG: 20; 50 CAPSULE, COATED PELLETS ORAL at 16:54

## 2025-01-14 RX ADMIN — PANTOPRAZOLE SODIUM 20 MG: 20 TABLET, DELAYED RELEASE ORAL at 09:01

## 2025-01-14 RX ADMIN — CLOZAPINE 250 MG: 25 TABLET ORAL at 21:12

## 2025-01-14 RX ADMIN — HYDROCHLOROTHIAZIDE 12.5 MG: 12.5 TABLET ORAL at 09:00

## 2025-01-14 RX ADMIN — DOCUSATE SODIUM 100 MG: 100 CAPSULE, LIQUID FILLED ORAL at 09:00

## 2025-01-14 NOTE — TREATMENT TEAM
01/14/25 1046   Vital Signs   Pulse 89   Heart Rate Source Monitor   Respirations 18   Blood Pressure 126/88   BP Location Left arm   BP Method Automatic   Patient Position - Orthostatic VS Sitting     Pt c/o blurred vision. VS stable. PRN Cogentin 1 mg given @ 10:48.

## 2025-01-14 NOTE — SOCIAL WORK
Patient reports his Aunt Mala would like to change his in person visit to tomorrow. This writer spoke with Mala. Patient will have visit tomorrow at 1000. Aunt will bring camera card for patient obtain new Photo ID.

## 2025-01-14 NOTE — ASSESSMENT & PLAN NOTE
Reviewed on 01/14/25  No significant changes still with same threatening voices as before not commanding and planning to have a virtual visit with his sisters  Continue medication as follows:  Continue Clozapine 250 mg QHS for psychosis - increased 12/11/24  To consider further careful titration  CBC w/diff and CK every 2 weeks  CK was increased to 960 on 12/12 and oral hydration encouraged, CK improved to 905 on 12/13/24. Repeat on 12/17/24 decreased to 674 and on 12/20/24 decreased to 448  Labs  drawn on 12/24/2024, repeat  trending down  Labs drawn 01/07, ANC 4.01  Labs drawn 01/08,   Continue Cobenfy 50/20 mg BID for psychosis (note syntax error from note on 01/09, 50/20 mg is the correct dose)  Increase dose after 7 days to 100/20 mg. Continue this dose for a month then reevaluate to start on 1/17/25  Abilify D/ferny  Continue Lexapro 20 mg daily for depression and anxiety  Robinul D/ferny on 01/13  Atropine D/ferny on 01/13  Continue Propanolol 10 mg BID for anxiety   Continue Melatonin 9 mg QHS for sleep  Continue Senna-Docusate sodium one 50 mg tablet QHS for constipation  Start Zofran 4 mg tablet Q6H PRN for nausea     Pt remains on dual antipsychotic Tx due to failure on monotherapy   .  S/p ECT treatments.  - ACT team referral was made for him living back with his aunt once MA funding comes through from the [facility] and sister will try to reapply

## 2025-01-14 NOTE — PROGRESS NOTES
Progress Note - Behavioral Health   Name: Alberto Berumen 29 y.o. male I MRN: [id number]   Unit/Bed#: [facility] 112-02 I Date of Admission: 3/29/2024   Date of Service: 1/14/2025 I Hospital Day: 291         Assessment & Plan  Schizoaffective disorder, bipolar type (HCC)  Reviewed on 01/14/25  No significant changes still with same threatening voices as before not commanding and planning to have a virtual visit with his sisters  Continue medication as follows:  Continue Clozapine 250 mg QHS for psychosis - increased 12/11/24  To consider further careful titration  CBC w/diff and CK every 2 weeks  CK was increased to 960 on 12/12 and oral hydration encouraged, CK improved to 905 on 12/13/24. Repeat on 12/17/24 decreased to 674 and on 12/20/24 decreased to 448  Labs  drawn on 12/24/2024, repeat  trending down  Labs drawn 01/07, ANC 4.01  Labs drawn 01/08,   Continue Cobenfy 50/20 mg BID for psychosis (note syntax error from note on 01/09, 50/20 mg is the correct dose)  Increase dose after 7 days to 100/20 mg. Continue this dose for a month then reevaluate to start on 1/17/25  Abilify D/ferny  Continue Lexapro 20 mg daily for depression and anxiety  Robinul D/ferny on 01/13  Atropine D/ferny on 01/13  Continue Propanolol 10 mg BID for anxiety   Continue Melatonin 9 mg QHS for sleep  Continue Senna-Docusate sodium one 50 mg tablet QHS for constipation  Start Zofran 4 mg tablet Q6H PRN for nausea     Pt remains on dual antipsychotic Tx due to failure on monotherapy   .  S/p ECT treatments.  - ACT team referral was made for him living back with his aunt once MA funding comes through from the [facility] and sister will try to reapply    Chronic idiopathic constipation  Reviewed on 01/14/25  Follows with medical team  Continue senna-docusate sodium dose per medical team.     GERD (gastroesophageal reflux disease)  Reviewed on 01/14/25  Follows with medical  Continue famotidine 20 mg tablet BID per medical team    Continue glycopyrrolate 1 mg tablet BID AM and afternoon per medical team  Continue glycopyrrolate 2 mg tablet before bed per medical team  Continue pantoprazole EC 20 mg tablet every morning per medical team  This is a chronic condition, and is stable unless otherwise noted.    Tobacco abuse   Reviewed on 1/14/25  on nicotine gum as needed and encouraged smoking cessation  Continue to encourage cessation upon discharge    Elevated hemoglobin A1c  Reviewed on 1/14/25  Follows with medical team    Class 2 obesity in adult  Reviewed on 1/14/25  Follows with medical team and given dietary counseling and need for exercise    Primary hypertension  Reviewed on 1/14/25  Follows with medical team on hydrochlorothiazide and Norvasc            Recommended Treatment:   1.Continue with group therapy, milieu therapy and occupational therapy  2.Behavioral Health checks every 15 minutes  3.Continue frequent safety checks and vitals per unit protocol  4.Continue with SLIM medical management as indicated  5.Continue with current medication regimen  6.Will review labs in the a.m.  7.Disposition Planning: Discharge planning and efforts remain ongoing     Planned medication and treatment changes:    All current active medications have been reviewed  Encourage group therapy, milieu therapy and occupational therapy  Behavioral Health checks for safety monitoring  Continue treatment with group therapy, milieu therapy and occupational therapy  Discharge planning   ACT referral   Patient is awaiting [facility] funding   Continue current medications      Current medications:  Current Facility-Administered Medications   Medication Dose Route Frequency Provider Last Rate    acetaminophen  650 mg Oral Q4H PRN Jordan C Holter,       acetaminophen  650 mg Oral Q6H PRN HOLLI Lion      aluminum-magnesium hydroxide-simethicone  30 mL Oral Q4H PRN Jordan C Holter,       amLODIPine  5 mg Oral Daily HOLLI Lion      Artificial Tears  1  drop Both Eyes Q3H PRN Jordan C Holter, DO      haloperidol lactate  2.5 mg Intramuscular Q4H PRN Max 4/day HOLLI Lion      And    LORazepam  1 mg Intramuscular Q4H PRN Max 4/day HOLLI Lion      And    benztropine  0.5 mg Intramuscular Q4H PRN Max 4/day HOLLI Lion      benztropine  1 mg Intramuscular Q4H PRN Max 6/day Jordan C Holter, DO      haloperidol lactate  5 mg Intramuscular Q4H PRN Max 4/day STEVE LionNP      And    LORazepam  2 mg Intramuscular Q4H PRN Max 4/day HOLLI Lion      And    benztropine  1 mg Intramuscular Q4H PRN Max 4/day HOLLI Lion      benztropine  1 mg Oral Q4H PRN Max 6/day HOLLI Lion      benztropine  1 mg Oral Q4H PRN Max 6/day Jordan C Holter, DO      bisacodyl  10 mg Rectal Daily PRN HOLLI Lion      calcium carbonate  500 mg Oral BID PRN HOLLI Monge      cloZAPine  250 mg Oral HS Bora Rosario MD      hydrOXYzine HCL  50 mg Oral Q6H PRN Max 4/day Jordan C Holter, DO      Or    diphenhydrAMINE  50 mg Intramuscular Q6H PRN Jordan C Holter, DO      hydrOXYzine HCL  50 mg Oral Q6H PRN Max 4/day HOLLI Lion      Or    diphenhydrAMINE  50 mg Intramuscular Q6H PRN HOLLI Lion      diphenhydrAMINE-zinc acetate   Topical BID PRN HOLLI Lion      docusate sodium  100 mg Oral BID Jourdan Dickesn, DO      escitalopram  20 mg Oral Daily HOLLI Lion      famotidine  20 mg Oral BID PRN HOLLI Monge      fluticasone  1 spray Each Nare Daily PRN HOLLI Monge      haloperidol  1 mg Oral Q6H PRN HOLLI Lion      haloperidol  2.5 mg Oral Q4H PRN Max 4/day HOLLI Lion      haloperidol  5 mg Oral Q4H PRN Max 4/day HOLLI Lion      hydroCHLOROthiazide  12.5 mg Oral Daily HOLLI Galvan      hydrocortisone   Topical 4x Daily PRN HOLLI Lion      hydrOXYzine HCL  100 mg Oral Q6H PRN Max 4/day Jordan C Holter, DO      Or    LORazepam  2  mg Intramuscular Q6H PRN Ion C Holter, DO      hydrOXYzine HCL  100 mg Oral Q6H PRN Max 4/day HOLLI Lion      Or    LORazepam  2 mg Intramuscular Q6H PRN HOLLI Lion      hydrOXYzine HCL  25 mg Oral Q6H PRN Max 4/day [facility] Holter, DO      ibuprofen  600 mg Oral Q8H PRN HOLLI Lion      influenza vaccine  0.5 mL Intramuscular Once Bora Rosario MD      loratadine  10 mg Oral Daily Brina Guillen MD      magnesium hydroxide  30 mL Oral Once Jourdan Dickens, DO      melatonin  3 mg Oral HS PRN Bora Rosario MD      melatonin  9 mg Oral HS Bora Rosario MD      methocarbamol  500 mg Oral Q6H PRN HOLLI Lion      multivitamin-minerals  1 tablet Oral Daily Eileen Holliday HOLLI Jensen      nicotine polacrilex  2 mg Oral Q4H PRN Bora Rosario MD      OLANZapine  5 mg Oral Q4H PRN Max 3/day [facility] Holter, DO      Or    OLANZapine  2.5 mg Intramuscular Q4H PRN Max 3/day [facility] Holter, DO      OLANZapine  5 mg Oral Q3H PRN Max 3/day [facility] Holter, DO      Or    OLANZapine  5 mg Intramuscular Q3H PRN Max 3/day [facility] Holter, DO      OLANZapine  2.5 mg Oral Q4H PRN Max 6/day [facility] Holter, DO      ondansetron  4 mg Oral Q6H PRN HOLLI Lion      pantoprazole  20 mg Oral [facility] Holter, DO      polyethylene glycol  17 g Oral Daily PRN HOLLI Ghotra      polyethylene glycol  17 g Oral Daily Jourdan Dickens, DO      propranolol  10 mg Oral Q12H KAYLIE HOLLI Lion      senna-docusate sodium  1 tablet Oral Daily PRN [facility] Holter, DO      senna-docusate sodium  2 tablet Oral HS Jourdan Dickens, DO      traZODone  50 mg Oral HS PRN HOLLI Lion      white petrolatum-mineral oil   Topical TID PRN HOLLI Lion      [START ON 1/17/2025] Xanomeline-Trospium Chloride  1 capsule Oral BID AMANDA Rosario MD      Xanomeline-Trospium Chloride  50 mg Oral BID AC Bora Rosario MD         Risks / Benefits of Treatment:    Risks, benefits, and possible side  "effects of medications explained to patient and patient verbalizes understanding and agreement for treatment.    Subjective:    Behavior over the last 24 hours: unchanged.     Per treatment team, Alberto is pleasant and cooperative. He has an upcoming visit planned with his family this Friday. Yesterday he relayed to staff that he does not like his new medication due to the side effects it has been causing him. He continues to endorse auditory hallucinations to staff that say they are going to kill him. He attended 6/9 groups yesterday. He did not require any psychotropic PRNs yesterday.    Today, Alberto reports his mood as \"alright\". He states his goal for the day is to workout. He said that he had fun playing [email or web address] yesterday with the other patients in the unit. He relayed that he has been experiencing nausea and fatigue since starting his new medication but that his nausea has gotten better with Zofran. He denies SI/HI or thoughts of passive death. He endorsed continual auditory hallucinations that were saying they were going to kill him. He denies command hallucinations. He says the hallucinations only involve him and not his family. He said that when they get bad he tries to just sleep them off. He reported a good night sleep the night prior and appetite at his baseline.      Sleep: normal  Appetite: normal  Medication side effects:  nausea and fatigue since starting new medication, improved with Zofran    ROS: no complaints    Mental Status Evaluation:    Appearance:  age appropriate, casually dressed, adequate grooming, laying in bed with a blanket over his face   Behavior:  Calm, cooperative, no eye contact, laid in bed with a blanket covering his face during conversation, easily aroused from sleep   Speech:  normal rate, normal volume, normal pitch, clear, coherent, not pressured or hyperverbal   Mood:  \"Alright\"   Affect:  constricted, mood-congruent   Thought Process:  organized, logical, coherent, " goal directed, linear   Associations: concrete associations   Thought Content:  mild paranoia, intrusive thoughts   Perceptual Disturbances: Auditory hallucinations of voices that say they are going to kill him. Denies command hallucinations. Denies involvement of family and says hallucinations only involve him. No visual hallucinations. Not observed responding to internal stimuli or appearing preoccupied   Risk Potential: Suicidal ideation - None  Homicidal ideation - None  Potential for aggression - Not at present   Sensorium:  oriented to person, place, and time/date   Memory:  recent and remote memory grossly intact   Consciousness:  alert and awake   Attention/Concentration: attention span and concentration are age appropriate   Insight:  good   Judgment: good   Gait/Station: normal gait/station   Motor Activity: no abnormal movements     Vital signs in last 24 hours:    Temp:  [97.5 °F (36.4 °C)] 97.5 °F (36.4 °C)  HR:  [] 83  BP: (115-129)/(57-70) 115/57  Resp:  [18] 18  SpO2:  [97 %-98 %] 97 %  O2 Device: None (Room air)         Laboratory results: I have personally reviewed all pertinent laboratory/tests results    Results from the past 24 hours: No results found for this or any previous visit (from the past 24 hours).    Suicide/Homicide Risk Assessment:    Risk of Harm to Self:   Nursing Suicide Risk Assessment Last 24 hours: C-SSRS Risk (Since Last Contact)  Calculated C-SSRS Risk Score (Since Last Contact): No Risk Indicated  Current Specific Risk Factors include: mental illness diagnosis  Protective Factors: no current suicidal ideation, ability to communicate with staff on the unit, able to contract for safety on the unit, ability to adapt to change, improved psychotic symptoms, responds to redirection  Based on today's assessment, Alberto presents the following risk of harm to self: none    Risk of Harm to Others:  Nursing Homicide Risk Assessment: Violence Risk to Others: Denies within past 6  months  Current Specific Risk Factors include: none  Protective Factors: no current homicidal ideation, able to communicate with staff on the unit, improved impulse control, psychotic symptoms are less prominent, compliant with medications on the unit as ordered, follows staff redirection  Based on today's assessment, Alberto presents the following risk of harm to others: none    The following interventions are recommended: Behavioral Health checks for safety monitoring, continued hospitalization on locked unit    Progress Toward Goals: progressing, attends groups, participates in milieu therapy, mood is stabilizing, discharge planning, Patient to move back with his aunt with an ACT team once MA funding approved    Counseling / Coordination of Care:    Total floor / unit time spent today 30 minutes. Greater than 50% of total time was spent with the patient and / or family counseling and / or coordination of care. A description of counseling / coordination of care:    Administrative Statements   Topics discussed with the patient / family include symptom assessment and management and medication review.    HOLLI Quinteros 01/14/25

## 2025-01-14 NOTE — NURSING NOTE
Pt is calm, cooperative and visible on milieu. Pt reports AH, depression and anxiety; denies SI/HI/VH. Continues to c/o about scheduled Cobenfy. Support and education provided. Compliant with medications and meals. Social with staff and peers. Q 15 min checks maintained.

## 2025-01-14 NOTE — ASSESSMENT & PLAN NOTE
Reviewed on 01/14/25  Follows with medical  Continue famotidine 20 mg tablet BID per medical team   Continue glycopyrrolate 1 mg tablet BID AM and afternoon per medical team  Continue glycopyrrolate 2 mg tablet before bed per medical team  Continue pantoprazole EC 20 mg tablet every morning per medical team  This is a chronic condition, and is stable unless otherwise noted.

## 2025-01-14 NOTE — ASSESSMENT & PLAN NOTE
Reviewed on 01/14/25  Follows with medical team  Continue senna-docusate sodium dose per medical team.

## 2025-01-14 NOTE — PLAN OF CARE
Problem: Alteration in Thoughts and Perception  Goal: Treatment Goal: Gain control of psychotic behaviors/thinking, reduce/eliminate presenting symptoms and demonstrate improved reality functioning upon discharge  Outcome: Progressing  Goal: Refrain from acting on delusional thinking/internal stimuli  Description: Interventions:  - Monitor patient closely, per order   - Utilize least restrictive measures   - Set reasonable limits, give positive feedback for acceptable   - Administer medications as ordered and monitor of potential side effects  Outcome: Progressing  Goal: Agree to be compliant with medication regime, as prescribed and report medication side effects  Description: Interventions:  - Offer appropriate PRN medication and supervise ingestion; conduct AIMS, as needed   Outcome: Progressing  Goal: Recognize dysfunctional thoughts, communicate reality-based thoughts at the time of discharge  Description: Interventions:  - Provide medication and psycho-education to assist patient in compliance and developing insight into his/her illness   Outcome: Progressing     Problem: Ineffective Coping  Goal: Identifies ineffective coping skills  Outcome: Progressing  Goal: Identifies healthy coping skills  Outcome: Progressing  Goal: Demonstrates healthy coping skills  Outcome: Progressing  Goal: Participates in unit activities  Description: Interventions:  - Provide therapeutic environment   - Provide required programming   - Redirect inappropriate behaviors   Outcome: Progressing     Problem: Depression  Goal: Treatment Goal: Demonstrate behavioral control of depressive symptoms, verbalize feelings of improved mood/affect, and adopt new coping skills prior to discharge  Outcome: Progressing  Goal: Refrain from isolation  Description: Interventions:  - Develop a trusting relationship   - Encourage socialization   Outcome: Progressing     Problem: Anxiety  Goal: Anxiety is at manageable level  Description:  Interventions:  - Assess and monitor patient's anxiety level.   - Monitor for signs and symptoms (heart palpitations, chest pain, shortness of breath, headaches, nausea, feeling jumpy, restlessness, irritable, apprehensive).   - Collaborate with interdisciplinary team and initiate plan and interventions as ordered.  - [place] patient to unit/surroundings  - Explain treatment plan  - Encourage participation in care  - Encourage verbalization of concerns/fears  - Identify coping mechanisms  - Assist in developing anxiety-reducing skills  - Administer/offer alternative therapies  - Limit or eliminate stimulants  Outcome: Progressing     Problem: Alteration in Orientation  Goal: Interact with staff daily  Description: Interventions:  - Assess and re-assess patient's level of orientation  - Engage patient in 1 on 1 interactions, daily, for a minimum of 15 minutes   - Establish rapport/trust with patient   Outcome: Progressing

## 2025-01-14 NOTE — ASSESSMENT & PLAN NOTE
Reviewed on 1/14/25  Follows with medical team and given dietary counseling and need for exercise

## 2025-01-14 NOTE — ASSESSMENT & PLAN NOTE
Reviewed on 1/14/25  on nicotine gum as needed and encouraged smoking cessation  Continue to encourage cessation upon discharge

## 2025-01-14 NOTE — PLAN OF CARE
Problem: Ineffective Coping  Goal: Identifies ineffective coping skills  Outcome: Progressing  Goal: Identifies healthy coping skills  Outcome: Progressing  Goal: Demonstrates healthy coping skills  Outcome: Progressing  Goal: Participates in unit activities  Description: Interventions:  - Provide therapeutic environment   - Provide required programming   - Redirect inappropriate behaviors   Outcome: Progressing   He continues to attend and engage in the group activities and a positive peer.

## 2025-01-14 NOTE — NURSING NOTE
"Pt has been visible the entire shift.  Has been pleasant and cooperative.  Pt still reporting AH of voices wanted to harm himself and his family.  Denied depression and anxiety on this shift.  When this nurse administered the Cobenfy at dinnertime, pt stated, \"I really don;t want to take this, but I guess I have to.\"  This nurse mentioned to pt that he has Zofran on board if he gets nauseous.  Pt declined.    No behavioral issues.    2131 - Pt consumed Nicotine gum x 2 on this shift.  "

## 2025-01-14 NOTE — PROGRESS NOTES
01/14/25 0818   Team Meeting   Meeting Type Daily Rounds   Team Members Present   Team Members Present Physician;Nurse;;Other (Discipline and Name)   Physician Team Member Te DE LA GARZA   Nursing Team Member Claudia   Social Work Team Member Jose J ORTEGA   Other (Discipline and Name) Wang PCM   Patient/Family Present   Patient Present No   Patient's Family Present No     Groups Participation  7/9.   Patient's compliant with medications. He's engaged in his treatment. He's appropriate. Waiting for ACT services to discharge to home with family. AH. Family visit on 1/17.

## 2025-01-15 LAB — CK SERPL-CCNC: 186 U/L (ref 39–308)

## 2025-01-15 PROCEDURE — 82550 ASSAY OF CK (CPK): CPT

## 2025-01-15 PROCEDURE — 99232 SBSQ HOSP IP/OBS MODERATE 35: CPT | Performed by: PSYCHIATRY & NEUROLOGY

## 2025-01-15 RX ADMIN — MULTIPLE VITAMINS W/ MINERALS TAB 1 TABLET: TAB ORAL at 08:31

## 2025-01-15 RX ADMIN — AMLODIPINE BESYLATE 5 MG: 5 TABLET ORAL at 08:30

## 2025-01-15 RX ADMIN — PROPRANOLOL HYDROCHLORIDE 10 MG: 10 TABLET ORAL at 21:18

## 2025-01-15 RX ADMIN — HYDROCHLOROTHIAZIDE 12.5 MG: 12.5 TABLET ORAL at 08:31

## 2025-01-15 RX ADMIN — PROPRANOLOL HYDROCHLORIDE 10 MG: 10 TABLET ORAL at 08:30

## 2025-01-15 RX ADMIN — MELATONIN TAB 3 MG 9 MG: 3 TAB at 21:18

## 2025-01-15 RX ADMIN — DOCUSATE SODIUM 100 MG: 100 CAPSULE, LIQUID FILLED ORAL at 08:32

## 2025-01-15 RX ADMIN — SENNOSIDES AND DOCUSATE SODIUM 2 TABLET: 50; 8.6 TABLET ORAL at 21:18

## 2025-01-15 RX ADMIN — CLOZAPINE 250 MG: 25 TABLET ORAL at 21:18

## 2025-01-15 RX ADMIN — DOCUSATE SODIUM 100 MG: 100 CAPSULE, LIQUID FILLED ORAL at 17:12

## 2025-01-15 RX ADMIN — XANOMELINE AND TROSPIUM CHLORIDE 50 MG: 20; 50 CAPSULE, COATED PELLETS ORAL at 06:17

## 2025-01-15 RX ADMIN — PANTOPRAZOLE SODIUM 20 MG: 20 TABLET, DELAYED RELEASE ORAL at 08:31

## 2025-01-15 RX ADMIN — XANOMELINE AND TROSPIUM CHLORIDE 50 MG: 20; 50 CAPSULE, COATED PELLETS ORAL at 17:13

## 2025-01-15 RX ADMIN — NICOTINE POLACRILEX 2 MG: 2 GUM, CHEWING ORAL at 21:18

## 2025-01-15 RX ADMIN — LORATADINE 10 MG: 10 TABLET ORAL at 08:31

## 2025-01-15 RX ADMIN — NICOTINE POLACRILEX 2 MG: 2 GUM, CHEWING ORAL at 17:12

## 2025-01-15 RX ADMIN — ESCITALOPRAM OXALATE 20 MG: 10 TABLET, FILM COATED ORAL at 08:31

## 2025-01-15 RX ADMIN — NICOTINE POLACRILEX 2 MG: 2 GUM, CHEWING ORAL at 08:31

## 2025-01-15 NOTE — SOCIAL WORK
This writer received e-mail from [facility] and [facility], informing that [facility] funding spots are currently unavailable and are not expected to become available until possibly the end of March.   This writer met with patient and provided an update regarding the un availability of [facility] funding for ACT services.  This writer spoke with patient's sister Ginger who sent a copy of the patient's SSA award letter via email.   This writer also met with the patient's aunt to provide an update on the patient's current treatment plan.   This writer confirmed arrangements with the patient to visit [facility] to obtain a new ID.   This writer will follow up with ACT team regarding [facility] funding available closer to the end of March.

## 2025-01-15 NOTE — ASSESSMENT & PLAN NOTE
Reviewed on 1/15/25  Follows with medical team and given dietary counseling and need for exercise

## 2025-01-15 NOTE — ASSESSMENT & PLAN NOTE
Reviewed on 1/15/25  on nicotine gum as needed and encouraged smoking cessation  Continue to encourage cessation upon discharge

## 2025-01-15 NOTE — PLAN OF CARE
Problem: Alteration in Thoughts and Perception  Goal: Treatment Goal: Gain control of psychotic behaviors/thinking, reduce/eliminate presenting symptoms and demonstrate improved reality functioning upon discharge  Outcome: Progressing  Goal: Refrain from acting on delusional thinking/internal stimuli  Description: Interventions:  - Monitor patient closely, per order   - Utilize least restrictive measures   - Set reasonable limits, give positive feedback for acceptable   - Administer medications as ordered and monitor of potential side effects  Outcome: Progressing  Goal: Agree to be compliant with medication regime, as prescribed and report medication side effects  Description: Interventions:  - Offer appropriate PRN medication and supervise ingestion; conduct AIMS, as needed   Outcome: Progressing     Problem: Ineffective Coping  Goal: Demonstrates healthy coping skills  Outcome: Progressing  Goal: Participates in unit activities  Description: Interventions:  - Provide therapeutic environment   - Provide required programming   - Redirect inappropriate behaviors   Outcome: Progressing     Problem: Depression  Goal: Verbalize thoughts and feelings  Description: Interventions:  - Assess and re-assess patient's level of risk   - Engage patient in 1:1 interactions, daily, for a minimum of 15 minutes   - Encourage patient to express feelings, fears, frustrations, hopes   Outcome: Progressing  Goal: Refrain from isolation  Description: Interventions:  - Develop a trusting relationship   - Encourage socialization   Outcome: Progressing  Goal: Refrain from self-neglect  Outcome: Progressing     Problem: Anxiety  Goal: Anxiety is at manageable level  Description: Interventions:  - Assess and monitor patient's anxiety level.   - Monitor for signs and symptoms (heart palpitations, chest pain, shortness of breath, headaches, nausea, feeling jumpy, restlessness, irritable, apprehensive).   - Collaborate with interdisciplinary  team and initiate plan and interventions as ordered.  - [place] patient to unit/surroundings  - Explain treatment plan  - Encourage participation in care  - Encourage verbalization of concerns/fears  - Identify coping mechanisms  - Assist in developing anxiety-reducing skills  - Administer/offer alternative therapies  - Limit or eliminate stimulants  Outcome: Progressing     Problem: Alteration in Orientation  Goal: Complete daily ADLs, including personal hygiene independently, as able  Description: Interventions:  - Observe, teach, and assist patient with ADLS  Outcome: Progressing

## 2025-01-15 NOTE — NURSING NOTE
Pt is visible on the unit and social with select peers. Consumed 100% of dinner. Took medications without incidence. Pt is pleasant and cooperative. Attended 9/11 groups. Reports anxiety, depression, and AH. Denies SI/HI/VH. No behavioral issues. Pt offers no complaints.

## 2025-01-15 NOTE — PROGRESS NOTES
Progress Note - Behavioral Health   Name: Alberto Berumen 29 y.o. male I MRN: [id number]  Unit/Bed#: [facility] 112-02 I Date of Admission: 3/29/2024   Date of Service: 1/15/2025 I Hospital Day: 292     Assessment & Plan  Schizoaffective disorder, bipolar type (HCC)  Reviewed on 01/15/25  No significant changes still with same threatening voices as before not commanding and planning to have a virtual visit with his sisters  Continue medication as follows:  Continue Clozapine 250 mg QHS for psychosis - increased 12/11/24  To consider further careful titration  CBC w/diff and CK every 2 weeks  CK was increased to 960 on 12/12 and oral hydration encouraged, CK improved to 905 on 12/13/24. Repeat on 12/17/24 decreased to 674 and on 12/20/24 decreased to 448  Labs  drawn on 12/24/2024, repeat  trending down  Labs drawn 01/07, ANC 4.01  Labs drawn 01/08,   Continue Cobenfy 50/20 mg BID for psychosis (note syntax error from note on 01/09, 50/20 mg is the correct dose)  Increase dose after 7 days to 100/20 mg (01/17) . Continue this dose for a month then reevaluate.  Abilify D/ferny  Continue Lexapro 20 mg daily for depression and anxiety  Robinul D/ferny on 01/13  Atropine D/ferny on 01/13  Continue Propanolol 10 mg BID for anxiety   Continue Melatonin 9 mg QHS for sleep  Continue Senna-Docusate sodium one 50 mg tablet QHS for constipation  Start Zofran 4 mg tablet Q6H PRN for nausea     Pt remains on dual antipsychotic Tx due to failure on monotherapy   .  S/p ECT treatments.  - ACT team referral was made for him living back with his aunt once MA funding comes through from the [facility] and sister will try to reapply    Chronic idiopathic constipation  Reviewed on 01/15/25  Follows with medical team  Continue senna-docusate sodium dose per medical team.     GERD (gastroesophageal reflux disease)  Reviewed on 01/15/25  Follows with medical  Continue famotidine 20 mg tablet BID per medical team   Continue  glycopyrrolate 1 mg tablet BID AM and afternoon per medical team  Continue glycopyrrolate 2 mg tablet before bed per medical team  Continue pantoprazole EC 20 mg tablet every morning per medical team  This is a chronic condition, and is stable unless otherwise noted.    Tobacco abuse   Reviewed on 1/15/25  on nicotine gum as needed and encouraged smoking cessation  Continue to encourage cessation upon discharge    Elevated hemoglobin A1c  Reviewed on 1/15/25  Follows with medical team    Class 2 obesity in adult  Reviewed on 1/15/25  Follows with medical team and given dietary counseling and need for exercise    Primary hypertension  Reviewed on 1/15/25  Follows with medical team on hydrochlorothiazide and Norvasc        Psychiatry Progress Note [facility]    Progress towards goals: moderate progress    Review of systems: denied    Psychiatric Diagnosis: Schizoaffective disorder, bipolar type     Assessment  Overall Status:  progressing, no significant changes   Certification Statement: The patient will continue to require additional inpatient hospital stay due to psychotic related symptoms and limited response to medications and ECT.         Medications: as above  Side effects from treatment: blurry vision, fatigue, nausea  Medication changes: as above  Medication education: Risks side effects benefits and precautions of medications discussed with patient and they did verbalize an understanding about risks for metabolic syndrome from being on neuroleptics and tardive dyskinesia etc.  All medications reviewed and I recommend that they be continued for symptom management  Understanding of medications: Risks side effects benefits and precautions of medications discussed with patient and they did verbalize an understanding about risks for metabolic syndrome from being on neuroleptics and tardive dyskinesia etc., and patient appeared to have understanding.  Justification for dual anti-psychotics:  "due to not responding to single antipsychotic    Non-pharmacological treatments  Continue with individual, group, milieu and occupational therapy using recovery principles and psycho-education about accepting illness and the need for treatment.  Behavioral health checks every 7 minutes    Safety  Safety and communication plan established to target dynamic risk factors discussed above.    Discharge Plan   To his Aunt with [facility] for ACT when received.    Interval Progress:   Per treatment team, Alberto had his CK level drawn this morning and we are awaiting the results of that. They reported that Alberto complained of blurry vision yesterday and so they gave him cogentin at 1048. Per Dr. Rosario his cogentin will be discontinued now starting 01/15. Alberto has had no changes in his behavior. He is pleasant and visible on the unit. He attended 9/11 groups yesterday.    Today, Alberto reports his mood as \"depressed\". When asked why he said because it felt like just another day. Alberto went on to say that he has been having trouble adjusting to his new medication and has felt off since starting it. He mentioned the blurry vision and said it lasted for around 2 hours yesterday and was only happening when he was reading. He reports his appetite and sleep as good. He denied SI/HI or passive thoughts of death. He said his goal for the day was to work out. He said he was looking forward to visiting with his aunt later today. He reported that he is still having auditory hallucinations that say they are going to kill him. He denies them involving his family.     Acceptance by patient: accepts inpatient treatment and medications  Hopefulness in recovery: to live with his aunt in the community  Involved in reintegration process: communicates with siblings and aunt   Trusting in relationship with psychiatrist: trusting  Sleep: good  Appetite: good  Compliance with Medications: compliant  Group attendance: " "9/11  Significant events: none in the last 24 hours     Mental Status Exam  Appearance: age appropriate, casually dressed, laying in bed with a blanket over his face, adequate grooming   Behavior: calm, cooperative, no eye contact, kept blanket over face during conversation, easily aroused from sleep  Speech: normal rate, normal volume, normal pitch, clear, coherent, not pressured or hyper-verbal  Mood: \"depressed\"  Affect: constricted, mood-congruent  Thought Process: organized, logical, coherent, goal directed, linear  Thought Content: mild paranoia, intrusive thoughts  Perceptual Disturbances:  auditory hallucinations of voices that say they are going to kill him. Denies command hallucinations. Denies involvement of family and says hallucinations only involve him. No visual hallucinations. Not observed responding to internal stimuli or appearing preoccupied.  Risk Potential: Suicidal Ideations none, Homicidal Ideations none, and Potential for Aggression No  Sensorium: alert and oriented to person, place, time and situation  Cognition: recent and remote memory grossly intact  Consciousness: alert and awake  Attention: attention span and concentration are age appropriate  Intellect: appears to be of average intelligence  Insight: intact  Judgement: intact  Motor Activity: no abnormal movements     Vitals  Temp:  [97.5 °F (36.4 °C)-98.7 °F (37.1 °C)] 98.7 °F (37.1 °C)  HR:  [84-94] 84  Resp:  [18] 18  BP: (125-146)/(65-88) 140/67  SpO2:  [97 %-100 %] 100 %  No intake or output data in the 24 hours ending 01/15/25 0916    Lab Results: All Labs For Current Hospital Admission Reviewed        Current Facility-Administered Medications   Medication Dose Route Frequency Provider Last Rate    acetaminophen  650 mg Oral Q4H PRN Jordan C Holter, DO      acetaminophen  650 mg Oral Q6H PRN HOLLI Lion      aluminum-magnesium hydroxide-simethicone  30 mL Oral Q4H PRN Jordan C Holter, DO      amLODIPine  5 mg Oral Daily " OHLLI Lion      Artificial Tears  1 drop Both Eyes Q3H PRN Jordan C Holter, DO      haloperidol lactate  2.5 mg Intramuscular Q4H PRN Max 4/day HOLLI Lion      And    LORazepam  1 mg Intramuscular Q4H PRN Max 4/day HOLLI Lion      And    benztropine  0.5 mg Intramuscular Q4H PRN Max 4/day HOLLI Lion      haloperidol lactate  5 mg Intramuscular Q4H PRN Max 4/day HOLLI Lion      And    LORazepam  2 mg Intramuscular Q4H PRN Max 4/day HOLLI Lion      And    benztropine  1 mg Intramuscular Q4H PRN Max 4/day HOLLI Lion      bisacodyl  10 mg Rectal Daily PRN HOLLI Lion      calcium carbonate  500 mg Oral BID PRN HOLLI Monge      cloZAPine  250 mg Oral HS Bora Rosario MD      hydrOXYzine HCL  50 mg Oral Q6H PRN Max 4/day Jordan C Holter, DO      Or    diphenhydrAMINE  50 mg Intramuscular Q6H PRN Jordan C Holter,       hydrOXYzine HCL  50 mg Oral Q6H PRN Max 4/day HOLLI Lion      Or    diphenhydrAMINE  50 mg Intramuscular Q6H PRN HOLLI Lion      diphenhydrAMINE-zinc acetate   Topical BID PRN HOLLI Lion      docusate sodium  100 mg Oral BID Jourdan Dickens,       escitalopram  20 mg Oral Daily HOLLI Lion      famotidine  20 mg Oral BID PRN HOLLI Monge      fluticasone  1 spray Each Nare Daily PRN HOLLI Monge      haloperidol  1 mg Oral Q6H PRN HOLLI Lion      haloperidol  2.5 mg Oral Q4H PRN Max 4/day HOLLI Lion      haloperidol  5 mg Oral Q4H PRN Max 4/day HOLLI Lion      hydroCHLOROthiazide  12.5 mg Oral Daily Pia Mantilla, HOLLI      hydrocortisone   Topical 4x Daily PRN HOLLI Lion      hydrOXYzine HCL  100 mg Oral Q6H PRN Max 4/day Jordan C Holter,       Or    LORazepam  2 mg Intramuscular Q6H PRN Jordan C Holter, DO      hydrOXYzine HCL  100 mg Oral Q6H PRN Max 4/day HOLLI Lion      Or    LORazepam  2 mg Intramuscular Q6H  PRN HOLLI Lion      hydrOXYzine HCL  25 mg Oral Q6H PRN Max 4/day [facility] Holter, DO      ibuprofen  600 mg Oral Q8H PRN HOLLI Lion      influenza vaccine  0.5 mL Intramuscular Once Bora Rosario MD      loratadine  10 mg Oral Daily Brina Guillen MD      magnesium hydroxide  30 mL Oral Once Jourdan Dickens, DO      melatonin  3 mg Oral HS PRN Bora Rosario MD      melatonin  9 mg Oral HS Bora Rosario MD      methocarbamol  500 mg Oral Q6H PRN HOLLI Lion      multivitamin-minerals  1 tablet Oral Daily Eileen Jensen, HOLLI      nicotine polacrilex  2 mg Oral Q4H PRN Bora Rosario MD      OLANZapine  5 mg Oral Q4H PRN Max 3/day [facility] Holter, DO      Or    OLANZapine  2.5 mg Intramuscular Q4H PRN Max 3/day [facility] Holter, DO      OLANZapine  5 mg Oral Q3H PRN Max 3/day [facility] Holter, DO      Or    OLANZapine  5 mg Intramuscular Q3H PRN Max 3/day [facility] Holter, DO      OLANZapine  2.5 mg Oral Q4H PRN Max 6/day [facility] Holter, DO      ondansetron  4 mg Oral Q6H PRN HOLLI Lion      pantoprazole  20 [facility] Hol[facility], DO      polyethylene glycol  17 g Oral Daily PRN HOLLI Ghotra      polyethylene glycol  17 g Oral Daily Jourdan Dickens, DO      propranolol  10 mg Oral Q12H KAYLIE HOLLI Lion      senna-docusate sodium  1 tablet Oral Daily PRN [facility] Cresencioter, DO      senna-docusate sodium  2 tablet Oral HS Jourdan Dickens, DO      traZODone  50 mg Oral HS PRN HOLLI Lion      white petrolatum-mineral oil   Topical TID PRN HOLLI Lion      [START ON 1/17/2025] Xanomeline-Trospium Chloride  1 capsule Oral BID AMANDA Rosario MD      Xanomeline-Trospium Chloride  50 mg Oral BID AMANDA Rosario MD         Counseling / Coordination of Care: Total floor / unit time spent today 15 minutes. Greater than 50% of total time was spent with the patient and / or family counseling and / or somewhat receptive to supportive listening and teaching  positive coping skills to deal with symptom mangement.     Patient's Rights, confidentiality and exceptions to confidentiality, use of automated medical record, Behavioral Health Services staff access to medical record, and consent to treatment reviewed.    This note has been dictated and hence there may be problems with punctuation, spelling and formatting and if anyone has any concerns please address them to Dr. Rosario  This note is not shared with patient due to potential for making patient's condition worse by knowing the content of the note.    Written by Leidy WALLACE and supervised by Dr. Rosario.

## 2025-01-15 NOTE — NURSING NOTE
Pt is calm, cooperative and visible on milieu. Pt reports AH, depression and anxiety; denies SI/HI/VH. Compliant with medications and meals. Social with staff and select peers. Reports visit with aunt went well. Pt offers no complaints at this time. Q 15 min checks maintained.

## 2025-01-15 NOTE — ASSESSMENT & PLAN NOTE
Reviewed on 01/15/25  Follows with medical  Continue famotidine 20 mg tablet BID per medical team   Continue glycopyrrolate 1 mg tablet BID AM and afternoon per medical team  Continue glycopyrrolate 2 mg tablet before bed per medical team  Continue pantoprazole EC 20 mg tablet every morning per medical team  This is a chronic condition, and is stable unless otherwise noted.

## 2025-01-15 NOTE — PROGRESS NOTES
01/15/25 0817   Team Meeting   Meeting Type Daily Rounds   Team Members Present   Team Members Present Physician;Nurse;;Other (Discipline and Name)   Physician Team Member Thomas, Holter   Nursing Team Member Claudia   Social Work Team Member Jose J ORTGEA   Other (Discipline and Name) Wang PCM   Patient/Family Present   Patient Present No   Patient's Family Present No     Groups Participation  9/11.   Patient's compliant with medications. Patient's appropriate. Family visit on 1/15. Blurry vision.

## 2025-01-15 NOTE — ASSESSMENT & PLAN NOTE
Reviewed on 01/15/25  Follows with medical team  Continue senna-docusate sodium dose per medical team.

## 2025-01-15 NOTE — ASSESSMENT & PLAN NOTE
Reviewed on 01/15/25  No significant changes still with same threatening voices as before not commanding and planning to have a virtual visit with his sisters  Continue medication as follows:  Continue Clozapine 250 mg QHS for psychosis - increased 12/11/24  To consider further careful titration  CBC w/diff and CK every 2 weeks  CK was increased to 960 on 12/12 and oral hydration encouraged, CK improved to 905 on 12/13/24. Repeat on 12/17/24 decreased to 674 and on 12/20/24 decreased to 448  Labs  drawn on 12/24/2024, repeat  trending down  Labs drawn 01/07, ANC 4.01  Labs drawn 01/08,   Continue Cobenfy 50/20 mg BID for psychosis (note syntax error from note on 01/09, 50/20 mg is the correct dose)  Increase dose after 7 days to 100/20 mg (01/17) . Continue this dose for a month then reevaluate.  Abilify D/ferny  Continue Lexapro 20 mg daily for depression and anxiety  Robinul D/ferny on 01/13  Atropine D/ferny on 01/13  Continue Propanolol 10 mg BID for anxiety   Continue Melatonin 9 mg QHS for sleep  Continue Senna-Docusate sodium one 50 mg tablet QHS for constipation  Start Zofran 4 mg tablet Q6H PRN for nausea     Pt remains on dual antipsychotic Tx due to failure on monotherapy   .  S/p ECT treatments.  - ACT team referral was made for him living back with his aunt once MA funding comes through from the [facility] and sister will try to reapply

## 2025-01-15 NOTE — NURSING NOTE
Pt in bed asleep, observed eyes closed, and chest movement noted. 15 minute safety checks maintained. No unmet needs at this time. Will continue to monitor patient needs, sleep pattern, and behaviors.     0640: Patient has slept all night, 7+ hours of uninterrupted sleep

## 2025-01-16 PROCEDURE — 99232 SBSQ HOSP IP/OBS MODERATE 35: CPT | Performed by: PSYCHIATRY & NEUROLOGY

## 2025-01-16 RX ADMIN — PANTOPRAZOLE SODIUM 20 MG: 20 TABLET, DELAYED RELEASE ORAL at 09:08

## 2025-01-16 RX ADMIN — PROPRANOLOL HYDROCHLORIDE 10 MG: 10 TABLET ORAL at 21:11

## 2025-01-16 RX ADMIN — XANOMELINE AND TROSPIUM CHLORIDE 1 CAPSULE: 20; 50 CAPSULE, COATED PELLETS ORAL at 06:25

## 2025-01-16 RX ADMIN — AMLODIPINE BESYLATE 5 MG: 5 TABLET ORAL at 09:09

## 2025-01-16 RX ADMIN — XANOMELINE AND TROSPIUM CHLORIDE 1 CAPSULE: 20; 50 CAPSULE, COATED PELLETS ORAL at 17:19

## 2025-01-16 RX ADMIN — NICOTINE POLACRILEX 2 MG: 2 GUM, CHEWING ORAL at 13:09

## 2025-01-16 RX ADMIN — MULTIPLE VITAMINS W/ MINERALS TAB 1 TABLET: TAB ORAL at 09:09

## 2025-01-16 RX ADMIN — LORATADINE 10 MG: 10 TABLET ORAL at 09:09

## 2025-01-16 RX ADMIN — DOCUSATE SODIUM 100 MG: 100 CAPSULE, LIQUID FILLED ORAL at 09:08

## 2025-01-16 RX ADMIN — NICOTINE POLACRILEX 2 MG: 2 GUM, CHEWING ORAL at 21:10

## 2025-01-16 RX ADMIN — CLOZAPINE 250 MG: 25 TABLET ORAL at 21:10

## 2025-01-16 RX ADMIN — PROPRANOLOL HYDROCHLORIDE 10 MG: 10 TABLET ORAL at 09:09

## 2025-01-16 RX ADMIN — MELATONIN TAB 3 MG 9 MG: 3 TAB at 21:10

## 2025-01-16 RX ADMIN — DOCUSATE SODIUM 100 MG: 100 CAPSULE, LIQUID FILLED ORAL at 17:24

## 2025-01-16 RX ADMIN — SENNOSIDES AND DOCUSATE SODIUM 2 TABLET: 50; 8.6 TABLET ORAL at 21:10

## 2025-01-16 RX ADMIN — NICOTINE POLACRILEX 2 MG: 2 GUM, CHEWING ORAL at 09:09

## 2025-01-16 RX ADMIN — NICOTINE POLACRILEX 2 MG: 2 GUM, CHEWING ORAL at 17:24

## 2025-01-16 RX ADMIN — HYDROCHLOROTHIAZIDE 12.5 MG: 12.5 TABLET ORAL at 09:08

## 2025-01-16 RX ADMIN — ESCITALOPRAM OXALATE 20 MG: 10 TABLET, FILM COATED ORAL at 09:09

## 2025-01-16 NOTE — PROGRESS NOTES
01/16/25 0800   Team Meeting   Meeting Type Daily Rounds   Team Members Present   Team Members Present Physician;Nurse;;Other (Discipline and Name)   Physician Team Member Thomas, Holter   Nursing Team Member Claudia   Social Work Team Member Jose J ORTEGA   Other (Discipline and Name) Wang PCM   Patient/Family Present   Patient Present No   Patient's Family Present No     Groups Participation  5/10.   Patient's compliant with medications. He's engaged in his treatment. Visit with Aunt yesterday went well.  Appointment at [facility] on Monday.

## 2025-01-16 NOTE — PROGRESS NOTES
Progress Note - Behavioral Health   Name: Alberto Berumen 29 y.o. male I MRN: [id number]  Unit/Bed#: [facility] 112-02 I Date of Admission: 3/29/2024   Date of Service: 1/16/2025 I Hospital Day: 293     Assessment & Plan  Schizoaffective disorder, bipolar type (HCC)  Reviewed on 01/16/25  No significant changes still with same threatening voices as before not commanding and planning to have a virtual visit with his sisters  Continue medication as follows:  Continue Clozapine 250 mg QHS for psychosis - increased 12/11/24  To consider further careful titration  CBC w/diff and CK every 2 weeks  CK was increased to 960 on 12/12 and oral hydration encouraged, CK improved to 905 on 12/13/24. Repeat on 12/17/24 decreased to 674 and on 12/20/24 decreased to 448  Labs  drawn on 12/24/2024, repeat  trending down  Labs drawn 01/07, ANC 4.01  Labs drawn 01/08,   Labs drawn 01/15   Continue Cobenfy 50/20 mg BID for psychosis (note syntax error from note on 01/09, 50/20 mg is the correct dose)  Increase dose after 7 days to 100/20 mg (01/17) . Continue this dose for a month then reevaluate.  Abilify D/ferny  Continue Lexapro 20 mg daily for depression and anxiety  Robinul D/ferny on 01/13  Atropine D/ferny on 01/13  Continue Propanolol 10 mg BID for anxiety   Continue Melatonin 9 mg QHS for sleep  Continue Senna-Docusate sodium one 50 mg tablet QHS for constipation  Continue Zofran 4 mg tablet Q6H PRN for nausea     Pt remains on dual antipsychotic Tx due to failure on monotherapy   .  S/p ECT treatments.  - ACT team referral was made for him living back with his aunt once MA funding comes through from the [facility] and sister will try to reapply    Chronic idiopathic constipation  Reviewed on 01/16/25  Follows with medical team  Continue senna-docusate sodium dose per medical team.     GERD (gastroesophageal reflux disease)  Reviewed on 01/16/25  Follows with medical  Continue famotidine 20 mg tablet BID per  medical team   Continue glycopyrrolate 1 mg tablet BID AM and afternoon per medical team  Continue glycopyrrolate 2 mg tablet before bed per medical team  Continue pantoprazole EC 20 mg tablet every morning per medical team  This is a chronic condition, and is stable unless otherwise noted.    Tobacco abuse   Reviewed on 1/16/25  on nicotine gum as needed and encouraged smoking cessation  Continue to encourage cessation upon discharge    Elevated hemoglobin A1c  Reviewed on 1/16/25  Follows with medical team    Class 2 obesity in adult  Reviewed on 1/16/25  Follows with medical team and given dietary counseling and need for exercise    Primary hypertension  Reviewed on 1/16/25  Follows with medical team on hydrochlorothiazide and Norvasc        Psychiatry Progress Note [facility]    Progress towards goals: moderate progress    Review of systems: denied    Psychiatric Diagnosis: Schizoaffective disorder, bipolar type    Assessment  Overall Status:  progressing, no significant changes  Certification Statement: The patient will continue to require additional inpatient hospital stay due to psychotic related symptoms and limited response to medications and ECT.        Medications: as above  Side effects from treatment: blurry vision  Medication changes: as above  Medication education: Risks side effects benefits and precautions of medications discussed with patient and they did verbalize an understanding about risks for metabolic syndrome from being on neuroleptics and tardive dyskinesia etc.  All medications reviewed and I recommend that they be continued for symptom management  Understanding of medications: Risks side effects benefits and precautions of medications discussed with patient and they did verbalize an understanding about risks for metabolic syndrome from being on neuroleptics and tardive dyskinesia etc., and patient appeared to have  understanding.  Justification for dual  "anti-psychotics: due to not responding to single antipsychotic    Non-pharmacological treatments  Continue with individual, group, milieu and occupational therapy using recovery principles and psycho-education about accepting illness and the need for treatment.  Behavioral health checks every 7 minutes    Safety  Safety and communication plan established to target dynamic risk factors discussed above.    Discharge Plan   To his Aunt with [facility] for ACT when received    Interval Progress:   Per treatment team, Alberto's CK level from yesterday came back within normal limits at 186. He offered no complaints about his new medication to staff yesterday. He had a visit with his Aunt yesterday that went well and she did his hair. He went to 9/10 groups yesterday.    Today, Alberto reports his mood as \"alright\". He said his visit with his aunt was alright yesterday but that he took out the arvin that his aunt did for him because he wants to get it re-done tomorrow. He stated that he had no goals today because he hasn't thought about it yet. He relayed that he was exhausted but that he slept okay last night. He relayed he continues to have auditory hallucinations that say they are going to kill him. He denies command hallucinations. He denies SI/HI but relayed that he sometimes has passive thoughts of death. He was reporting blurred vision and fatigue from his new medication this morning.     Acceptance by patient: accepts inpatient treatment and medications  Hopefulness in recovery: to live with his aunt in the community  Involved in reintegration process: communicates with siblings and aunt  Trusting in relationship with psychiatrist: trusting  Sleep: good  Appetite: good  Compliance with Medications: compliant  Group attendance: 9/10 groups  Significant events: had a visit with his aunt yesterday that went well    Mental Status Exam  Appearance: age appropriate, causally dressed, laying in bed asleep and snoring " "with a blanket over his face, adequate grooming  Behavior: calm, cooperative, no eye contact, kept blanket over face during conversation, easily aroused from sleep  Speech: normal rate, normal volume, normal pitch, clear, coherent, not pressured or hyper-verbal  Mood: \"alright\"  Affect: constricted, mood-congruent  Thought Process: organized, logical, coherent, linear  Thought Content: mild paranoia, intrusive thoughts  Perceptual Disturbances:  auditory hallucinations of voices that say they are going to kill him. Denies command hallucinations. Denies involvement of family and says hallucinations only involve him. No visual hallucinations. Not observed responding to internal stimuli or appearing preoccupied.  Risk Potential: Suicidal Ideations none, Homicidal Ideations none, and Potential for Aggression No  Sensorium: alert and oriented to person, place, time and situation  Cognition: recent and remote memory grossly intact  Consciousness: alert and awake  Attention: attention span and concentration are age appropriate  Intellect: appears to be of average intelligence  Insight: intact  Judgement: intact  Motor Activity: no abnormal movements     Vitals  Temp:  [97.8 °F (36.6 °C)-98.4 °F (36.9 °C)] 97.8 °F (36.6 °C)  HR:  [] 85  Resp:  [18] 18  BP: (116-135)/(60-73) 116/60  SpO2:  [98 %] 98 %  No intake or output data in the 24 hours ending 01/16/25 0956    Lab Results: All Labs For Current Hospital Admission Reviewed        Current Facility-Administered Medications   Medication Dose Route Frequency Provider Last Rate    acetaminophen  650 mg Oral Q4H PRN Jordan C Holter, DO      acetaminophen  650 mg Oral Q6H PRN HOLLI Lion      aluminum-magnesium hydroxide-simethicone  30 mL Oral Q4H PRN Jordan C Holter, DO      amLODIPine  5 mg Oral Daily HOLLI Lion      Artificial Tears  1 drop Both Eyes Q3H PRN Jordan C Holter, DO      haloperidol lactate  2.5 mg Intramuscular Q4H PRN Max 4/day Eveline" HOLLI Hunt      And    LORazepam  1 mg Intramuscular Q4H PRN Max 4/day HOLLI Lion      And    benztropine  0.5 mg Intramuscular Q4H PRN Max 4/day HOLLI Lion      haloperidol lactate  5 mg Intramuscular Q4H PRN Max 4/day HOLLI Lion      And    LORazepam  2 mg Intramuscular Q4H PRN Max 4/day HOLLI Lion      And    benztropine  1 mg Intramuscular Q4H PRN Max 4/day HOLLI Lion      bisacodyl  10 mg Rectal Daily PRN HOLLI Lion      calcium carbonate  500 mg Oral BID PRN HOLLI Monge      cloZAPine  250 mg Oral HS Bora Rosario MD      hydrOXYzine HCL  50 mg Oral Q6H PRN Max 4/day Jordan C Holter,       Or    diphenhydrAMINE  50 mg Intramuscular Q6H PRN Jordan C Holter,       hydrOXYzine HCL  50 mg Oral Q6H PRN Max 4/day HOLLI Lion      Or    diphenhydrAMINE  50 mg Intramuscular Q6H PRN HOLLI Lion      diphenhydrAMINE-zinc acetate   Topical BID PRN HOLLI Lion      docusate sodium  100 mg Oral BID Jourdan Dickens,       escitalopram  20 mg Oral Daily HOLLI Lion      famotidine  20 mg Oral BID PRN HOLLI Monge      fluticasone  1 spray Each Nare Daily PRN HOLLI Monge      haloperidol  1 mg Oral Q6H PRN HOLLI Lion      haloperidol  2.5 mg Oral Q4H PRN Max 4/day HOLLI Lion      haloperidol  5 mg Oral Q4H PRN Max 4/day HOLLI Lion      hydroCHLOROthiazide  12.5 mg Oral Daily HOLLI Galvan      hydrocortisone   Topical 4x Daily PRN HOLLI Lion      hydrOXYzine HCL  100 mg Oral Q6H PRN Max 4/day Jordan C Holter, DO      Or    LORazepam  2 mg Intramuscular Q6H PRN Jordan C Holter, DO      hydrOXYzine HCL  100 mg Oral Q6H PRN Max 4/day HOLLI Lion      Or    LORazepam  2 mg Intramuscular Q6H PRN HOLLI Lion      hydrOXYzine HCL  25 mg Oral Q6H PRN Max 4/day Jordan C Holter,       ibuprofen  600 mg Oral Q8H PRN HOLLI Lion       influenza vaccine  0.5 mL Intramuscular Once Bora Rosario MD      loratadine  10 mg Oral Daily Brina Guillen MD      magnesium hydroxide  30 mL Oral Once Jourdan Dickens, DO      melatonin  3 mg Oral HS PRN Bora Rosario MD      melatonin  9 mg Oral HS Bora Rosario MD      methocarbamol  500 mg Oral Q6H PRN HOLLI Lion      multivitamin-minerals  1 tablet Oral Daily Eileen Gonzalezmiryamjaylen, HOLLI      nicotine polacrilex  2 mg Oral Q4H PRN Bora Rosario MD      OLANZapine  5 mg Oral Q4H PRN Max 3/day [facility] Holter, DO      Or    OLANZapine  2.5 mg Intramuscular Q4H PRN Max 3/day [facility] Holter, DO      OLANZapine  5 mg Oral Q3H PRN Max 3/day [facility] Holter, DO      Or    OLANZapine  5 mg Intramuscular Q3H PRN Max 3/day [facility] Holter, DO      OLANZapine  2.5 mg Oral Q4H PRN Max 6/day [facility] Holter, DO      ondansetron  4 mg Oral Q6H PRN HOLLI Lion      pantoprazole  20 mg Oral QAMercy [place] Holter, DO      polyethylene glycol  17 g Oral Daily PRN HOLLI Ghotra      polyethylene glycol  17 g Oral Daily Jourdan Dickens, DO      propranolol  10 mg Oral Q12H KAYLIE HOLLI Lion      senna-docusate sodium  1 tablet Oral Daily PRN [facility] Holter, DO      senna-docusate sodium  2 tablet Oral HS Jourdan Dickens, DO      traZODone  50 mg Oral HS PRN HOLLI Lion      white petrolatum-mineral oil   Topical TID PRN HOLLI Lion      [START ON 1/17/2025] Xanomeline-Trospium Chloride  1 capsule Oral BID AC Bora Rosario MD      Xanomeline-Trospium Chloride  50 mg Oral BID AC Bora Rosario MD         Counseling / Coordination of Care: Total floor / unit time spent today 15 minutes. Greater than 50% of total time was spent with the patient and / or family counseling and / or somewhat receptive to supportive listening and teaching positive coping skills to deal with symptom mangement.     Patient's Rights, confidentiality and exceptions to confidentiality, use of automated medical  record, Behavioral Health Services staff access to medical record, and consent to treatment reviewed.    This note has been dictated and hence there may be problems with punctuation, spelling and formatting and if anyone has any concerns please address them to Dr. Rosario.  This note is not shared with patient due to potential for making patient's condition worse by knowing the content of the note.    Written by Leidy WALLACE and supervised by Dr. Rosario.

## 2025-01-16 NOTE — ASSESSMENT & PLAN NOTE
Reviewed on 01/16/25  No significant changes still with same threatening voices as before not commanding and planning to have a virtual visit with his sisters  Continue medication as follows:  Continue Clozapine 250 mg QHS for psychosis - increased 12/11/24  To consider further careful titration  CBC w/diff and CK every 2 weeks  CK was increased to 960 on 12/12 and oral hydration encouraged, CK improved to 905 on 12/13/24. Repeat on 12/17/24 decreased to 674 and on 12/20/24 decreased to 448  Labs  drawn on 12/24/2024, repeat  trending down  Labs drawn 01/07, ANC 4.01  Labs drawn 01/08,   Labs drawn 01/15   Continue Cobenfy 50/20 mg BID for psychosis (note syntax error from note on 01/09, 50/20 mg is the correct dose)  Increase dose after 7 days to 100/20 mg (01/17) . Continue this dose for a month then reevaluate.  Abilify D/ferny  Continue Lexapro 20 mg daily for depression and anxiety  Robinul D/ferny on 01/13  Atropine D/ferny on 01/13  Continue Propanolol 10 mg BID for anxiety   Continue Melatonin 9 mg QHS for sleep  Continue Senna-Docusate sodium one 50 mg tablet QHS for constipation  Continue Zofran 4 mg tablet Q6H PRN for nausea     Pt remains on dual antipsychotic Tx due to failure on monotherapy   .  S/p ECT treatments.  - ACT team referral was made for him living back with his aunt once MA funding comes through from the [facility] and sister will try to reapply

## 2025-01-16 NOTE — NURSING NOTE
Received pt in bed at change of shift with eyes closed; chest movement noted.  Continues the same thus this far as per q 15 min room checks.   Will continue to monitor behavior, sleeping pattern and any medical issues that may arise.    0601;  Sleeping 7+ hrs thus this far

## 2025-01-16 NOTE — ASSESSMENT & PLAN NOTE
Reviewed on 1/16/25  Follows with medical team and given dietary counseling and need for exercise

## 2025-01-16 NOTE — PLAN OF CARE
Problem: Alteration in Thoughts and Perception  Goal: Agree to be compliant with medication regime, as prescribed and report medication side effects  Description: Interventions:  - Offer appropriate PRN medication and supervise ingestion; conduct AIMS, as needed   1/16/2025 0213 by Mireya Pearson RN  Outcome: Progressing  1/16/2025 0212 by Mireya Pearson RN  Outcome: Progressing  Goal: Recognize dysfunctional thoughts, communicate reality-based thoughts at the time of discharge  Description: Interventions:  - Provide medication and psycho-education to assist patient in compliance and developing insight into his/her illness   1/16/2025 0213 by Mireya Pearson RN  Outcome: Progressing  1/16/2025 0212 by Mireya Pearson RN  Outcome: Progressing     Problem: Ineffective Coping  Goal: Participates in unit activities  Description: Interventions:  - Provide therapeutic environment   - Provide required programming   - Redirect inappropriate behaviors   Outcome: Progressing  Goal: Patient/Family participate in treatment and DC plans  Description: Interventions:  - Provide therapeutic environment  Outcome: Progressing  Goal: Patient/Family verbalizes awareness of resources  Outcome: Progressing     Problem: Depression  Goal: Treatment Goal: Demonstrate behavioral control of depressive symptoms, verbalize feelings of improved mood/affect, and adopt new coping skills prior to discharge  Outcome: Progressing  Goal: Verbalize thoughts and feelings  Description: Interventions:  - Assess and re-assess patient's level of risk   - Engage patient in 1:1 interactions, daily, for a minimum of 15 minutes   - Encourage patient to express feelings, fears, frustrations, hopes   Outcome: Progressing  Goal: Refrain from harming self  Description: Interventions:  - Monitor patient closely, per order   - Supervise medication ingestion, monitor effects and side effects   Outcome: Progressing  Goal: Refrain from  isolation  Description: Interventions:  - Develop a trusting relationship   - Encourage socialization   Outcome: Progressing  Goal: Refrain from self-neglect  Outcome: Progressing     Problem: Anxiety  Goal: Anxiety is at manageable level  Description: Interventions:  - Assess and monitor patient's anxiety level.   - Monitor for signs and symptoms (heart palpitations, chest pain, shortness of breath, headaches, nausea, feeling jumpy, restlessness, irritable, apprehensive).   - Collaborate with interdisciplinary team and initiate plan and interventions as ordered.  - [place] patient to unit/surroundings  - Explain treatment plan  - Encourage participation in care  - Encourage verbalization of concerns/fears  - Identify coping mechanisms  - Assist in developing anxiety-reducing skills  - Administer/offer alternative therapies  - Limit or eliminate stimulants  Outcome: Progressing     Problem: Alteration in Orientation  Goal: Treatment Goal: Demonstrate a reduction of confusion and improved orientation to person, place, time and/or situation upon discharge, according to optimum baseline/ability  Outcome: Progressing  Goal: Interact with staff daily  Description: Interventions:  - Assess and re-assess patient's level of orientation  - Engage patient in 1 on 1 interactions, daily, for a minimum of 15 minutes   - Establish rapport/trust with patient   Outcome: Progressing  Goal: Allow medical examinations, as recommended  Description: Interventions:  - Provide physical/neurological exams and/or referrals, per provider   Outcome: Progressing  Goal: Cooperate with recommended testing/procedures  Description: Interventions:  - Determine need for ancillary testing  - Observe for mental status changes  - Implement falls/precaution protocol   Outcome: Progressing  Goal: Complete daily ADLs, including personal hygiene independently, as able  Description: Interventions:  - Observe, teach, and assist patient with ADLS  Outcome:  Progressing     Problem: DISCHARGE PLANNING  Goal: Discharge to home with appropriate resources  Description: INTERVENTIONS:  - Identify barriers to discharge w/patient and caregiver  - Arrange for needed discharge resources and transportation as appropriate  - Identify discharge learning needs (meds, wound care, etc.)  - Refer to Case Management Department for coordinating discharge planning if the patient needs post-hospital services based on physician/advanced practitioner order or complex needs related to functional status, cognitive ability, or social support system  Outcome: Progressing

## 2025-01-16 NOTE — ASSESSMENT & PLAN NOTE
Reviewed on 01/16/25  Follows with medical team  Continue senna-docusate sodium dose per medical team.

## 2025-01-16 NOTE — ASSESSMENT & PLAN NOTE
Reviewed on 1/16/25  on nicotine gum as needed and encouraged smoking cessation  Continue to encourage cessation upon discharge

## 2025-01-16 NOTE — ASSESSMENT & PLAN NOTE
Reviewed on 01/16/25  Follows with medical  Continue famotidine 20 mg tablet BID per medical team   Continue glycopyrrolate 1 mg tablet BID AM and afternoon per medical team  Continue glycopyrrolate 2 mg tablet before bed per medical team  Continue pantoprazole EC 20 mg tablet every morning per medical team  This is a chronic condition, and is stable unless otherwise noted.

## 2025-01-16 NOTE — NURSING NOTE
-patient previously on phenobarbital as a child, had seizure-like activity in the postop period, discharged on Keppra  -continue Keppra  -Patient has been advised she should not drive until she is at least 6 months seizure-free, will need to remain on Keppra for at least 1 year, longer if recurrent seizures  -patient inquired about phenobarbital as she is familiar with that drug, outpatient neurology recommended that she follow up outpatient for change or titration of medication     Patient has been visible on the unit all shift. Pleasant and cooperative. Polite.  Offers no complaints. Social with peers and helpful on the unit. Medication compliant. Denies any suicidal ideations. Admits having auditory hallucinations of them saying they will harm him and his family. Reassurance given. Appetite excellent. Good visit with his Aunt  earlier today. She styled his hair. Group attendance 9/10.

## 2025-01-17 PROCEDURE — 99232 SBSQ HOSP IP/OBS MODERATE 35: CPT | Performed by: PSYCHIATRY & NEUROLOGY

## 2025-01-17 RX ADMIN — NICOTINE POLACRILEX 2 MG: 2 GUM, CHEWING ORAL at 12:59

## 2025-01-17 RX ADMIN — SENNOSIDES AND DOCUSATE SODIUM 2 TABLET: 50; 8.6 TABLET ORAL at 21:01

## 2025-01-17 RX ADMIN — XANOMELINE AND TROSPIUM CHLORIDE 1 CAPSULE: 20; 50 CAPSULE, COATED PELLETS ORAL at 09:22

## 2025-01-17 RX ADMIN — DOCUSATE SODIUM 100 MG: 100 CAPSULE, LIQUID FILLED ORAL at 08:44

## 2025-01-17 RX ADMIN — DOCUSATE SODIUM 100 MG: 100 CAPSULE, LIQUID FILLED ORAL at 17:10

## 2025-01-17 RX ADMIN — MELATONIN TAB 3 MG 9 MG: 3 TAB at 21:01

## 2025-01-17 RX ADMIN — NICOTINE POLACRILEX 2 MG: 2 GUM, CHEWING ORAL at 08:46

## 2025-01-17 RX ADMIN — PROPRANOLOL HYDROCHLORIDE 10 MG: 10 TABLET ORAL at 21:01

## 2025-01-17 RX ADMIN — MULTIPLE VITAMINS W/ MINERALS TAB 1 TABLET: TAB ORAL at 08:44

## 2025-01-17 RX ADMIN — ESCITALOPRAM OXALATE 20 MG: 10 TABLET, FILM COATED ORAL at 08:46

## 2025-01-17 RX ADMIN — CLOZAPINE 250 MG: 25 TABLET ORAL at 21:01

## 2025-01-17 RX ADMIN — NICOTINE POLACRILEX 2 MG: 2 GUM, CHEWING ORAL at 21:01

## 2025-01-17 RX ADMIN — NICOTINE POLACRILEX 2 MG: 2 GUM, CHEWING ORAL at 17:10

## 2025-01-17 RX ADMIN — PANTOPRAZOLE SODIUM 20 MG: 20 TABLET, DELAYED RELEASE ORAL at 08:45

## 2025-01-17 RX ADMIN — LORATADINE 10 MG: 10 TABLET ORAL at 08:45

## 2025-01-17 RX ADMIN — XANOMELINE AND TROSPIUM CHLORIDE 1 CAPSULE: 20; 50 CAPSULE, COATED PELLETS ORAL at 17:10

## 2025-01-17 NOTE — ASSESSMENT & PLAN NOTE
Reviewed on 1/17/25  Follows with medical team and given dietary counseling and need for exercise

## 2025-01-17 NOTE — NURSING NOTE
Pt is visible in the milieu and social with select peers. He consumed 25 % of dinner and 100 % of Ensure. Took his medications without incidence, but pt reports that he does not like the side effects from the new medication that he is on. Encouraged to discuss with Doctor.  Pt is pleasant, polite, and cooperative. Brightens on approach. Pt endorses AH of voices saying they will harm him and his family. Attended all evening groups. No unmet needs. No behavioral issues.

## 2025-01-17 NOTE — NURSING NOTE
Pt is visible on the unit and social with select peers. He was playing ping pong with peers. Consumed 100% of dinner. Took medications without incidence. Pt is pleasant and cooperative. Attended 5/8 groups. Reports AH that threaten him and his family, depression, and anxiety. He uses his coping skills appropriately. Denies SI/HI/VH. No behavioral issues. Pt offers no new complaints.

## 2025-01-17 NOTE — ASSESSMENT & PLAN NOTE
Reviewed on 01/17/25  Follows with medical team  Continue senna-docusate sodium dose per medical team.

## 2025-01-17 NOTE — ASSESSMENT & PLAN NOTE
Reviewed on 01/17/25  No significant changes still with same threatening voices as before not commanding and planning to have a virtual visit with his sisters  Continue medication as follows:  Continue Clozapine 250 mg QHS for psychosis - increased 12/11/24  To consider further careful titration  CBC w/diff and CK every 2 weeks  CK was increased to 960 on 12/12 and oral hydration encouraged, CK improved to 905 on 12/13/24. Repeat on 12/17/24 decreased to 674 and on 12/20/24 decreased to 448  Labs  drawn on 12/24/2024, repeat  trending down  Labs drawn 01/07, ANC 4.01  Labs drawn 01/08,   Labs drawn 01/15   Continue Cobenfy 50/20 mg BID for psychosis  Hold off on increasing dose due to patient complaints of side effects  Abilify D/ferny  Continue Lexapro 20 mg daily for depression and anxiety  Robinul D/ferny on 01/13  Atropine D/ferny on 01/13  Continue Propanolol 10 mg BID for anxiety   Continue Melatonin 9 mg QHS for sleep  Continue Senna-Docusate sodium one 50 mg tablet QHS for constipation  Continue Zofran 4 mg tablet Q6H PRN for nausea     Pt remains on dual antipsychotic Tx due to failure on monotherapy   .  S/p ECT treatments.  - ACT team referral was made for him living back with his aunt once MA funding comes through from the [facility] and sister will try to reapply

## 2025-01-17 NOTE — PROGRESS NOTES
Progress Note - Behavioral Health   Name: Alberto Berumen 29 y.o. male I MRN: [id number]  Unit/Bed#: [facility] 112-02 I Date of Admission: 3/29/2024   Date of Service: 1/17/2025 I Hospital Day: 294     Assessment & Plan  Schizoaffective disorder, bipolar type (HCC)  Reviewed on 01/17/25  No significant changes still with same threatening voices as before not commanding and planning to have a virtual visit with his sisters  Continue medication as follows:  Continue Clozapine 250 mg QHS for psychosis - increased 12/11/24  To consider further careful titration  CBC w/diff and CK every 2 weeks  CK was increased to 960 on 12/12 and oral hydration encouraged, CK improved to 905 on 12/13/24. Repeat on 12/17/24 decreased to 674 and on 12/20/24 decreased to 448  Labs  drawn on 12/24/2024, repeat  trending down  Labs drawn 01/07, ANC 4.01  Labs drawn 01/08,   Labs drawn 01/15   Continue Cobenfy 50/20 mg BID for psychosis  Hold off on increasing dose due to patient complaints of side effects  Abilify D/ferny  Continue Lexapro 20 mg daily for depression and anxiety  Robinul D/ferny on 01/13  Atropine D/ferny on 01/13  Continue Propanolol 10 mg BID for anxiety   Continue Melatonin 9 mg QHS for sleep  Continue Senna-Docusate sodium one 50 mg tablet QHS for constipation  Continue Zofran 4 mg tablet Q6H PRN for nausea     Pt remains on dual antipsychotic Tx due to failure on monotherapy   .  S/p ECT treatments.  - ACT team referral was made for him living back with his aunt once MA funding comes through from the [facility] and sister will try to reapply    Chronic idiopathic constipation  Reviewed on 01/17/25  Follows with medical team  Continue senna-docusate sodium dose per medical team.     GERD (gastroesophageal reflux disease)  Reviewed on 01/17/25  Follows with medical  Continue famotidine 20 mg tablet BID per medical team   Continue glycopyrrolate 1 mg tablet BID AM and afternoon per medical team  Continue  glycopyrrolate 2 mg tablet before bed per medical team  Continue pantoprazole EC 20 mg tablet every morning per medical team  This is a chronic condition, and is stable unless otherwise noted.    Tobacco abuse   Reviewed on 1/17/25  on nicotine gum as needed and encouraged smoking cessation  Continue to encourage cessation upon discharge    Elevated hemoglobin A1c  Reviewed on 1/17/25  Follows with medical team    Class 2 obesity in adult  Reviewed on 1/17/25  Follows with medical team and given dietary counseling and need for exercise    Primary hypertension  Reviewed on 1/17/25  Follows with medical team on hydrochlorothiazide and Norvasc        Psychiatry Progress Note [facility]    Progress towards goals: moderate progress    Review of systems: denied    Psychiatric Diagnosis: Schizoaffective disorder, bipolar type    Assessment  Overall Status:  progressing, no significant changes  Certification Statement: The patient will continue to require additional inpatient hospital stay due to psychotic related symptoms and limited response to medications and ECT.        Medications: as above  Side effects from treatment: dry mouth and blurred vision  Medication changes: as above  Medication education: Risks side effects benefits and precautions of medications discussed with patient and they did verbalize an understanding about risks for metabolic syndrome from being on neuroleptics and tardive dyskinesia etc.  All medications reviewed and I recommend that they be continued for symptom management  Understanding of medications: Risks side effects benefits and precautions of medications discussed with patient and they did verbalize an understanding about risks for metabolic syndrome from being on neuroleptics and tardive dyskinesia etc., and patient appeared to have understanding.  Justification for dual anti-psychotics: due to not responding to single antipsychotic    Non-pharmacological  "treatments  Continue with individual, group, milieu and occupational therapy using recovery principles and psycho-education about accepting illness and the need for treatment.  Behavioral health checks every 7 minutes    Safety  Safety and communication plan established to target dynamic risk factors discussed above.    Discharge Plan   To his Aunt with [facility] for ACT when received    Interval Progress:   Per treatment team, Alberto is cooperative and pleasant. There have been no changes in his behavior in the last 24 hours. Alberto continues to report dry mouth and blurred vision as a result of his new medication to staff. He continues to endorse auditory hallucinations that say they are going to kill him when he leaves here. He denies command hallucinations. He attended 8//11 groups yesterday.    Today, Alberto reports his mood as \"stable\". He says his goals for the day are to work out and read his bible. He said that yesterday he had a good day and played [employer]. He stated that he continues to have blurred vision and dry mouth from his new medication but that the dry mouth seems to be improving. He reports continual auditory hallucinations that say they are going to kill him when he leaves here. He denies command hallucinations or the hallucinations involving his family. He denied SI/HI or passive wishes of death. He reported he slept good last night and his appetite is stable.    Acceptance by patient: accepts inpatient treatment and medications  Hopefulness in recovery: to live with his aunt in the community  Involved in reintegration process: communicates with siblings and aunt  Trusting in relationship with psychiatrist: trusting  Sleep: good  Appetite: good  Compliance with Medications: compliant  Group attendance: 8/11  Significant events: none in the last 24 hours    Mental Status Exam  Appearance: age appropriate, casually dressed, laying in bed asleep and snoring with a blanket over his face, " "adequate grooming  Behavior: calm, cooperative, no eye contact, kept blanket over face during conversation,  easily aroused from sleep  Speech: normal rate, normal volume, normal pitch, clear, coherent, not pressured or hyper-verbal  Mood: \"stable\"  Affect: constricted, mood-congruent  Thought Process: organized, logical, coherent, goal directed, linear  Thought Content: mild paranoia, intrusive thoughts  Perceptual Disturbances:  auditory hallucinations of voices that say they are going to kill him when he leaves here. Denies command hallucinations. Denies involvement of family and says hallucinations only involve him. No visual hallucinations. Not observed responding to internal stimuli or appearing preoccupied.   Risk Potential: Suicidal Ideations none, Homicidal Ideations none, and Potential for Aggression No  Sensorium: alert and oriented to person, place, time and situation  Cognition: recent and remote memory grossly intact  Consciousness: alert and awake  Attention: attention span and concentration are age appropriate  Intellect: appears to be of average intelligence  Insight: intact  Judgement: intact  Motor Activity: no abnormal movements     Vitals  Temp:  [97.7 °F (36.5 °C)] 97.7 °F (36.5 °C)  HR:  [] 68  Resp:  [18] 18  BP: (104-139)/(51-70) 104/51  No intake or output data in the 24 hours ending 01/17/25 0909    Lab Results: All Labs For Current Hospital Admission Reviewed        Current Facility-Administered Medications   Medication Dose Route Frequency Provider Last Rate    acetaminophen  650 mg Oral Q4H PRN Jordan C Holter,       acetaminophen  650 mg Oral Q6H PRN HOLLI Lion      aluminum-magnesium hydroxide-simethicone  30 mL Oral Q4H PRN Jordan C Holter,       amLODIPine  5 mg Oral Daily HOLLI Lion      Artificial Tears  1 drop Both Eyes Q3H PRN Jordan C Holter, DO      haloperidol lactate  2.5 mg Intramuscular Q4H PRN Max 4/day HOLLI Lion      And    LORazepam  1 " mg Intramuscular Q4H PRN Max 4/day HOLLI Lion      And    benztropine  0.5 mg Intramuscular Q4H PRN Max 4/day HOLLI Lion      haloperidol lactate  5 mg Intramuscular Q4H PRN Max 4/day HOLLI Lion      And    LORazepam  2 mg Intramuscular Q4H PRN Max 4/day HOLLI Lion      And    benztropine  1 mg Intramuscular Q4H PRN Max 4/day HOLLI Lion      bisacodyl  10 mg Rectal Daily PRN HOLLI Lion      calcium carbonate  500 mg Oral BID PRN HOLLI Monge      cloZAPine  250 mg Oral HS Bora Rosario MD      hydrOXYzine HCL  50 mg Oral Q6H PRN Max 4/day Jordan C Holter, DO      Or    diphenhydrAMINE  50 mg Intramuscular Q6H PRN Jordan C Holter,       hydrOXYzine HCL  50 mg Oral Q6H PRN Max 4/day HOLLI Lion      Or    diphenhydrAMINE  50 mg Intramuscular Q6H PRN HOLLI Lion      diphenhydrAMINE-zinc acetate   Topical BID PRN HOLLI Lion      docusate sodium  100 mg Oral BID Jourdan Dickens,       escitalopram  20 mg Oral Daily HOLLI Lion      famotidine  20 mg Oral BID PRN HOLLI Monge      fluticasone  1 spray Each Nare Daily PRN HOLLI Monge      haloperidol  1 mg Oral Q6H PRN HOLLI Lion      haloperidol  2.5 mg Oral Q4H PRN Max 4/day HOLLI Lion      haloperidol  5 mg Oral Q4H PRN Max 4/day HOLLI Lion      hydroCHLOROthiazide  12.5 mg Oral Daily Pia Mantilla, HOLLI      hydrocortisone   Topical 4x Daily PRN HOLLI Lion      hydrOXYzine HCL  100 mg Oral Q6H PRN Max 4/day Jordan C Holter,       Or    LORazepam  2 mg Intramuscular Q6H PRN Jordan C Holter,       hydrOXYzine HCL  100 mg Oral Q6H PRN Max 4/day HOLLI Lion      Or    LORazepam  2 mg Intramuscular Q6H PRN HOLLI Lion      hydrOXYzine HCL  25 mg Oral Q6H PRN Max 4/day Jordan C Holter, DO      ibuprofen  600 mg Oral Q8H PRN HOLLI Lion      influenza vaccine  0.5 mL Intramuscular  Once Bora Rosario MD      loratadine  10 mg Oral Daily Brina Guillen MD      magnesium hydroxide  30 mL Oral Once Jourdan Dickens, DO      melatonin  3 mg Oral HS PRN Bora Rosario MD      melatonin  9 mg Oral HS Bora Rosario MD      methocarbamol  500 mg Oral Q6H PRN HOLLI Lion      multivitamin-minerals  1 tablet Oral Daily Eileenaddie CherryHOLLI Jimenez      nicotine polacrilex  2 mg Oral Q4H PRN Bora Rosario MD      NON FORMULARY  50 mg Oral BID Bora Rosario MD      OLANZapine  5 mg Oral Q4H PRN Max 3/day [facility] Holter, DO      Or    OLANZapine  2.5 mg Intramuscular Q4H PRN Max 3/day [facility] Holter, DO      OLANZapine  5 mg Oral Q3H PRN Max 3/day [facility] Holter, DO      Or    OLANZapine  5 mg Intramuscular Q3H PRN Max 3/day [facility] Holter, DO      OLANZapine  2.5 mg Oral Q4H PRN Max 6/day [facility] Holter, DO      ondansetron  4 mg Oral Q6H PRN HOLLI Lion      pantoprazole  20 mg Oral QAM [facility] Holter, DO      polyethylene glycol  17 g Oral Daily PRN Sofi Yoo, HOLLI      polyethylene glycol  17 g Oral Daily Jourdan Dickens, DO      propranolol  10 mg Oral Q12H KAYLIE HOLLI Lion      senna-docusate sodium  1 tablet Oral Daily PRN [facility] Holter, DO      senna-docusate sodium  2 tablet Oral HS Jourdan Dickens, DO      traZODone  50 mg Oral HS PRN HOLLI Lion      white petrolatum-mineral oil   Topical TID PRN HOLLI Lion         Counseling / Coordination of Care: Total floor / unit time spent today 15 minutes. Greater than 50% of total time was spent with the patient and / or family counseling and / or somewhat receptive to supportive listening and teaching positive coping skills to deal with symptom mangement.     Patient's Rights, confidentiality and exceptions to confidentiality, use of automated medical record, Behavioral Health Services staff access to medical record, and consent to treatment reviewed.    This note has been dictated and hence there may be  problems with punctuation, spelling and formatting and if anyone has any concerns please address them to Dr. Rosario  This note is not shared with patient due to potential for making patient's condition worse by knowing the content of the note.    Written by Leidy WALLACE and supervised by Dr. Rosario

## 2025-01-17 NOTE — ASSESSMENT & PLAN NOTE
Reviewed on 1/17/25  on nicotine gum as needed and encouraged smoking cessation  Continue to encourage cessation upon discharge

## 2025-01-17 NOTE — SOCIAL WORK
This writer discussed with patient, appointment with Herb Sanders must change to 1/22 due to MLK holiday on 1/20. This writer scheduled [facility] request for 1/22 @1011

## 2025-01-17 NOTE — ASSESSMENT & PLAN NOTE
Reviewed on 01/17/25  Follows with medical  Continue famotidine 20 mg tablet BID per medical team   Continue glycopyrrolate 1 mg tablet BID AM and afternoon per medical team  Continue glycopyrrolate 2 mg tablet before bed per medical team  Continue pantoprazole EC 20 mg tablet every morning per medical team  This is a chronic condition, and is stable unless otherwise noted.

## 2025-01-17 NOTE — PLAN OF CARE
Problem: Alteration in Thoughts and Perception  Goal: Treatment Goal: Gain control of psychotic behaviors/thinking, reduce/eliminate presenting symptoms and demonstrate improved reality functioning upon discharge  Outcome: Progressing  Goal: Refrain from acting on delusional thinking/internal stimuli  Description: Interventions:  - Monitor patient closely, per order   - Utilize least restrictive measures   - Set reasonable limits, give positive feedback for acceptable   - Administer medications as ordered and monitor of potential side effects  Outcome: Progressing  Goal: Agree to be compliant with medication regime, as prescribed and report medication side effects  Description: Interventions:  - Offer appropriate PRN medication and supervise ingestion; conduct AIMS, as needed   Outcome: Progressing  Goal: Recognize dysfunctional thoughts, communicate reality-based thoughts at the time of discharge  Description: Interventions:  - Provide medication and psycho-education to assist patient in compliance and developing insight into his/her illness   Outcome: Progressing     Problem: Ineffective Coping  Goal: Identifies ineffective coping skills  Outcome: Progressing  Goal: Identifies healthy coping skills  Outcome: Progressing  Goal: Demonstrates healthy coping skills  Outcome: Progressing  Goal: Participates in unit activities  Description: Interventions:  - Provide therapeutic environment   - Provide required programming   - Redirect inappropriate behaviors   Outcome: Progressing     Problem: Depression  Goal: Treatment Goal: Demonstrate behavioral control of depressive symptoms, verbalize feelings of improved mood/affect, and adopt new coping skills prior to discharge  Outcome: Progressing  Goal: Refrain from isolation  Description: Interventions:  - Develop a trusting relationship   - Encourage socialization   Outcome: Progressing     Problem: Anxiety  Goal: Anxiety is at manageable level  Description:  Interventions:  - Assess and monitor patient's anxiety level.   - Monitor for signs and symptoms (heart palpitations, chest pain, shortness of breath, headaches, nausea, feeling jumpy, restlessness, irritable, apprehensive).   - Collaborate with interdisciplinary team and initiate plan and interventions as ordered.  - [place] patient to unit/surroundings  - Explain treatment plan  - Encourage participation in care  - Encourage verbalization of concerns/fears  - Identify coping mechanisms  - Assist in developing anxiety-reducing skills  - Administer/offer alternative therapies  - Limit or eliminate stimulants  Outcome: Progressing     Problem: Alteration in Orientation  Goal: Interact with staff daily  Description: Interventions:  - Assess and re-assess patient's level of orientation  - Engage patient in 1 on 1 interactions, daily, for a minimum of 15 minutes   - Establish rapport/trust with patient   Outcome: Progressing     Problem: DISCHARGE PLANNING  Goal: Discharge to home with appropriate resources  Description: INTERVENTIONS:  - Identify barriers to discharge w/patient and caregiver  - Arrange for needed discharge resources and transportation as appropriate  - Identify discharge learning needs (meds, wound care, etc.)  - Refer to Case Management Department for coordinating discharge planning if the patient needs post-hospital services based on physician/advanced practitioner order or complex needs related to functional status, cognitive ability, or social support system  Outcome: Progressing

## 2025-01-17 NOTE — PROGRESS NOTES
01/17/25 0817   Team Meeting   Meeting Type Daily Rounds   Team Members Present   Team Members Present Physician;Nurse;;Other (Discipline and Name)   Physician Team Member Thomas, Holter   Nursing Team Member Thaddeus   Social Work Team Member Jose J ORTEGA   Patient/Family Present   Patient Present No   Patient's Family Present No     Groups Participation  8/11.   Patient's compliant with medications. He's engaged in his treatment. Cecil Dot appointment on Monday. AH and VH. Cobenfy will remain at 50mg.

## 2025-01-18 PROCEDURE — 99232 SBSQ HOSP IP/OBS MODERATE 35: CPT | Performed by: PHYSICIAN ASSISTANT

## 2025-01-18 RX ORDER — XYLITOL/YERBA SANTA
5 AEROSOL, SPRAY WITH PUMP (ML) MUCOUS MEMBRANE 4 TIMES DAILY PRN
Status: DISCONTINUED | OUTPATIENT
Start: 2025-01-18 | End: 2025-03-14 | Stop reason: HOSPADM

## 2025-01-18 RX ADMIN — NICOTINE POLACRILEX 2 MG: 2 GUM, CHEWING ORAL at 21:05

## 2025-01-18 RX ADMIN — LORATADINE 10 MG: 10 TABLET ORAL at 08:35

## 2025-01-18 RX ADMIN — PROPRANOLOL HYDROCHLORIDE 10 MG: 10 TABLET ORAL at 08:34

## 2025-01-18 RX ADMIN — PROPRANOLOL HYDROCHLORIDE 10 MG: 10 TABLET ORAL at 21:05

## 2025-01-18 RX ADMIN — HYDROCHLOROTHIAZIDE 12.5 MG: 12.5 TABLET ORAL at 08:34

## 2025-01-18 RX ADMIN — ESCITALOPRAM OXALATE 20 MG: 10 TABLET, FILM COATED ORAL at 08:36

## 2025-01-18 RX ADMIN — XANOMELINE AND TROSPIUM CHLORIDE 1 CAPSULE: 20; 50 CAPSULE, COATED PELLETS ORAL at 08:39

## 2025-01-18 RX ADMIN — XANOMELINE AND TROSPIUM CHLORIDE 1 CAPSULE: 20; 50 CAPSULE, COATED PELLETS ORAL at 17:24

## 2025-01-18 RX ADMIN — NICOTINE POLACRILEX 2 MG: 2 GUM, CHEWING ORAL at 17:10

## 2025-01-18 RX ADMIN — DOCUSATE SODIUM 100 MG: 100 CAPSULE, LIQUID FILLED ORAL at 17:10

## 2025-01-18 RX ADMIN — PANTOPRAZOLE SODIUM 20 MG: 20 TABLET, DELAYED RELEASE ORAL at 08:34

## 2025-01-18 RX ADMIN — DOCUSATE SODIUM 100 MG: 100 CAPSULE, LIQUID FILLED ORAL at 08:35

## 2025-01-18 RX ADMIN — NICOTINE POLACRILEX 2 MG: 2 GUM, CHEWING ORAL at 08:36

## 2025-01-18 RX ADMIN — Medication 5 SPRAY: at 21:10

## 2025-01-18 RX ADMIN — CLOZAPINE 250 MG: 25 TABLET ORAL at 21:05

## 2025-01-18 RX ADMIN — SENNOSIDES AND DOCUSATE SODIUM 2 TABLET: 50; 8.6 TABLET ORAL at 21:05

## 2025-01-18 RX ADMIN — MELATONIN TAB 3 MG 9 MG: 3 TAB at 21:05

## 2025-01-18 RX ADMIN — MULTIPLE VITAMINS W/ MINERALS TAB 1 TABLET: TAB ORAL at 08:35

## 2025-01-18 NOTE — NURSING NOTE
Patient has been visible on the unit most of the shift.  Pleasant and cooperative. He was seen by the Psychiatric PAC today and had  no complaints of blurry vision or nausea.  Mouthkote ordered. Appetite good. Medication compliant. Social with peers. Smiles on approach. Pleasant. Admits having auditory hallucinations telling him they will harm him and his family. Verbal support given. Attends most groups.

## 2025-01-18 NOTE — PLAN OF CARE
Problem: Alteration in Thoughts and Perception  Goal: Treatment Goal: Gain control of psychotic behaviors/thinking, reduce/eliminate presenting symptoms and demonstrate improved reality functioning upon discharge  Outcome: Progressing  Goal: Agree to be compliant with medication regime, as prescribed and report medication side effects  Description: Interventions:  - Offer appropriate PRN medication and supervise ingestion; conduct AIMS, as needed   Outcome: Progressing  Goal: Recognize dysfunctional thoughts, communicate reality-based thoughts at the time of discharge  Description: Interventions:  - Provide medication and psycho-education to assist patient in compliance and developing insight into his/her illness   Outcome: Progressing     Problem: Ineffective Coping  Goal: Participates in unit activities  Description: Interventions:  - Provide therapeutic environment   - Provide required programming   - Redirect inappropriate behaviors   Outcome: Progressing  Goal: Patient/Family participate in treatment and DC plans  Description: Interventions:  - Provide therapeutic environment  Outcome: Progressing  Goal: Patient/Family verbalizes awareness of resources  Outcome: Progressing     Problem: Depression  Goal: Treatment Goal: Demonstrate behavioral control of depressive symptoms, verbalize feelings of improved mood/affect, and adopt new coping skills prior to discharge  Outcome: Progressing  Goal: Verbalize thoughts and feelings  Description: Interventions:  - Assess and re-assess patient's level of risk   - Engage patient in 1:1 interactions, daily, for a minimum of 15 minutes   - Encourage patient to express feelings, fears, frustrations, hopes   Outcome: Progressing  Goal: Refrain from harming self  Description: Interventions:  - Monitor patient closely, per order   - Supervise medication ingestion, monitor effects and side effects   Outcome: Progressing  Goal: Refrain from isolation  Description:  Interventions:  - Develop a trusting relationship   - Encourage socialization   Outcome: Progressing  Goal: Refrain from self-neglect  Outcome: Progressing     Problem: Anxiety  Goal: Anxiety is at manageable level  Description: Interventions:  - Assess and monitor patient's anxiety level.   - Monitor for signs and symptoms (heart palpitations, chest pain, shortness of breath, headaches, nausea, feeling jumpy, restlessness, irritable, apprehensive).   - Collaborate with interdisciplinary team and initiate plan and interventions as ordered.  - [place] patient to unit/surroundings  - Explain treatment plan  - Encourage participation in care  - Encourage verbalization of concerns/fears  - Identify coping mechanisms  - Assist in developing anxiety-reducing skills  - Administer/offer alternative therapies  - Limit or eliminate stimulants  Outcome: Progressing     Problem: Alteration in Orientation  Goal: Treatment Goal: Demonstrate a reduction of confusion and improved orientation to person, place, time and/or situation upon discharge, according to optimum baseline/ability  Outcome: Progressing  Goal: Interact with staff daily  Description: Interventions:  - Assess and re-assess patient's level of orientation  - Engage patient in 1 on 1 interactions, daily, for a minimum of 15 minutes   - Establish rapport/trust with patient   Outcome: Progressing  Goal: Cooperate with recommended testing/procedures  Description: Interventions:  - Determine need for ancillary testing  - Observe for mental status changes  - Implement falls/precaution protocol   Outcome: Progressing  Goal: Complete daily ADLs, including personal hygiene independently, as able  Description: Interventions:  - Observe, teach, and assist patient with ADLS  Outcome: Progressing     Problem: DISCHARGE PLANNING  Goal: Discharge to home with appropriate resources  Description: INTERVENTIONS:  - Identify barriers to discharge w/patient and caregiver  - Arrange for  needed discharge resources and transportation as appropriate  - Identify discharge learning needs (meds, wound care, etc.)  - Refer to Case Management Department for coordinating discharge planning if the patient needs post-hospital services based on physician/advanced practitioner order or complex needs related to functional status, cognitive ability, or social support system  Outcome: Progressing

## 2025-01-18 NOTE — PROGRESS NOTES
Progress Note - Behavioral Health   Name: Alberto Berumen 29 y.o. male I MRN: [id number]  Unit/Bed#: [facility] 112-02 I Date of Admission: 3/29/2024   Date of Service: 1/18/2025 I Hospital Day: 295     Assessment & Plan  Schizoaffective disorder, bipolar type (HCC)  Reviewed on 01/18/25  No significant changes still with same threatening voices as before not commanding and planning to have a virtual visit with his sisters  Continue medication as follows:  Continue Clozapine 250 mg QHS for psychosis - increased 12/11/24  To consider further careful titration  CBC w/diff and CK every 2 weeks  CK was increased to 960 on 12/12 and oral hydration encouraged, CK improved to 905 on 12/13/24. Repeat on 12/17/24 decreased to 674 and on 12/20/24 decreased to 448  Labs  drawn on 12/24/2024, repeat  trending down  Labs drawn 01/15, ANC 4.01  Labs drawn 01/08,   Labs drawn 01/15   Continue Cobenfy 50/20 mg BID for psychosis  Hold off on increasing dose due to patient complaints of side effects. Continue to monitor for toleration. Currently reporting intermittent nausea and blurry vision when reading and xerostomia.   Abilify D/ferny  Continue Lexapro 20 mg daily for depression and anxiety  Robinul D/ferny on 01/13  Atropine D/ferny on 01/13  Continue Propanolol 10 mg BID for anxiety   Continue Melatonin 9 mg QHS for sleep  Continue Senna-Docusate sodium one 50 mg tablet QHS for constipation  Continue Zofran 4 mg tablet Q6H PRN for nausea   Start PRN mouth kote for xerostomia 1/18/25    Pt remains on dual antipsychotic Tx due to failure on monotherapy   .  S/p ECT treatments.  - ACT team referral was made for him living back with his aunt once MA funding comes through from the [facility] and sister will try to reapply    Chronic idiopathic constipation  Reviewed on 01/18/25  Follows with medical team  Continue senna-docusate sodium dose per medical team.   Last BM 1/17/25    GERD (gastroesophageal reflux  "disease)  Reviewed on 01/18/25  Follows with medical  Continue famotidine 20 mg tablet BID per medical team   Continue pantoprazole EC 20 mg tablet every morning per medical team  This is a chronic condition, and is stable unless otherwise noted.    Tobacco abuse   Reviewed on 1/18/25  on nicotine gum as needed and encouraged smoking cessation  Continue to encourage cessation upon discharge    Elevated hemoglobin A1c  Reviewed on 1/18/25  Follows with medical team    Class 2 obesity in adult  Reviewed on 1/18/25  Follows with medical team and given dietary counseling and need for exercise    Primary hypertension  Reviewed on 1/18/25  Follows with medical team on hydrochlorothiazide and Norvasc        Progress Note - Behavioral Health     Alberto Berumen 29 y.o. male MRN: [id number]   Unit/Bed#: [facility] 112-02 Encounter: [id number]    Behavior over the last 24 hours: unchanged.     Alberto was seen and evaluated today. Per nursing, patient  is calm, cooperative and visible on milieu. Pt reports AH, depression and anxiety; denies SI/HI/VH. Pt interacts appropriately with peers and reports sleeping well. Pt is visible on the unit and social with select peers. He was playing ping pong with peers. Consumed 100% of dinner. Took medications without incidence. Pt is pleasant and cooperative. Attended 5/8 groups. Reports AH that threaten him and his family, depression, and anxiety. He uses his coping skills appropriately. Denies SI/HI/VH. No behavioral issues.     Today patient is found in bed, a blanket covering his eyes. He states his mood is \"tired . He has been experiencing side effects to his recent medication change. He reports that his depression and anxiety remain in the \"moderate\" range. He agrees to maintain current medication dosing and to add PRN mouth kote for dry mouth.  In regard to medication tolerance, experiencing blurred vision when reading, intermittent nausea, and dry mouth. Denies nausea or blurry vision " "currently. Patient denies SI/HI. Endorses AH, though denies CAH. Denies VH.  In regard to sleep and appetite, stable sleep and variable appetite.  No acute events over the past 24H.         ROS: no complaints    Mental Status Evaluation:    Appearance:  age appropriate, casually dressed, adequate grooming, laying in bed with a blanket over his face   Behavior:  Calm, cooperative, no eye contact, laid in bed with a blanket covering his face during conversation, easily aroused from sleep   Speech:  normal rate, normal volume, normal pitch, clear, coherent, not pressured or hyperverbal   Mood:  \"tired\"   Affect:  constricted, mood-congruent   Thought Process:  organized, logical, coherent, goal directed, linear   Associations: In tact associations   Thought Content:  No delusions elicited on interview   Perceptual Disturbances: Auditory hallucinations of voices that say they are going to kill him. Denies command hallucinations. Denies involvement of family and says hallucinations only involve him. No visual hallucinations. Not observed responding to internal stimuli or appearing preoccupied   Risk Potential: Suicidal ideation - None  Homicidal ideation - None  Potential for aggression - Not at present   Sensorium:  oriented to person, place, and time/date   Memory:  recent and remote memory grossly intact   Consciousness:  alert and awake   Attention/Concentration: attention span and concentration are age appropriate   Insight:  fair   Judgment: fair   Gait/Station: In bed   Motor Activity: no abnormal movements     Vital signs in last 24 hours:    Temp:  [97.8 °F (36.6 °C)] 97.8 °F (36.6 °C)  HR:  [] 80  BP: (112-120)/(60-76) 112/60  Resp:  [18] 18  SpO2:  [98 %] 98 %  O2 Device: None (Room air)    Laboratory results: I have personally reviewed all pertinent laboratory/tests results    Results from the past 24 hours: No results found for this or any previous visit (from the past 24 hours).    Progress Toward " Goals: progressing    Assessment & Plan   Principal Problem:    Schizoaffective disorder, bipolar type (HCC)  Active Problems:    GERD (gastroesophageal reflux disease)    Tobacco abuse    T wave inversion in EKG    Chronic idiopathic constipation    Confluent and reticulate papillomatosis    Class 2 obesity in adult    Primary hypertension    Elevated hemoglobin A1c    Bilateral lower extremity edema      Recommended Treatment:     Planned medication and treatment changes:    All current active medications have been reviewed  Encourage group therapy, milieu therapy and occupational therapy  Behavioral Health checks every 7 minutes  Continue current medications:    Current Facility-Administered Medications   Medication Dose Route Frequency Provider Last Rate    acetaminophen  650 mg Oral Q4H PRN Jordan C Holter,       acetaminophen  650 mg Oral Q6H PRN HOLLI Lion      aluminum-magnesium hydroxide-simethicone  30 mL Oral Q4H PRN Jordan C Holter, DO      amLODIPine  5 mg Oral Daily HOLLI Lion      Artificial Tears  1 drop Both Eyes Q3H PRN Jordan C Holter,       haloperidol lactate  2.5 mg Intramuscular Q4H PRN Max 4/day HOLLI Lion      And    LORazepam  1 mg Intramuscular Q4H PRN Max 4/day HOLLI Lion      And    benztropine  0.5 mg Intramuscular Q4H PRN Max 4/day HOLLI Lion      haloperidol lactate  5 mg Intramuscular Q4H PRN Max 4/day HOLLI Lion      And    LORazepam  2 mg Intramuscular Q4H PRN Max 4/day HOLLI Lion      And    benztropine  1 mg Intramuscular Q4H PRN Max 4/day HOLLI Lion      bisacodyl  10 mg Rectal Daily PRN HOLLI Lion      calcium carbonate  500 mg Oral BID PRN HOLLI Monge      cloZAPine  250 mg Oral HS Bora Rosario MD      hydrOXYzine HCL  50 mg Oral Q6H PRN Max 4/day Jordan C Holter,       Or    diphenhydrAMINE  50 mg Intramuscular Q6H PRN Jordan C Holter,       hydrOXYzine HCL  50 mg Oral Q6H PRN  Max 4/day HOLLI Lion      Or    diphenhydrAMINE  50 mg Intramuscular Q6H PRN HOLLI Lion      diphenhydrAMINE-zinc acetate   Topical BID PRN HOLLI Lion      docusate sodium  100 mg Oral BID Jourdan Dickens, DO      escitalopram  20 mg Oral Daily HOLLI Lion      famotidine  20 mg Oral BID PRN HOLLI Monge      fluticasone  1 spray Each Nare Daily PRN HOLLI Monge      haloperidol  1 mg Oral Q6H PRN HOLLI Lion      haloperidol  2.5 mg Oral Q4H PRN Max 4/day HOLLI Lion      haloperidol  5 mg Oral Q4H PRN Max 4/day HOLLI Lion      hydroCHLOROthiazide  12.5 mg Oral Daily Pia Mantilla, HOLLI      hydrocortisone   Topical 4x Daily PRN HOLLI Lion      hydrOXYzine HCL  100 mg Oral Q6H PRN Max 4/day [facility] Holter, DO      Or    LORazepam  2 mg Intramuscular Q6H PRN [facility] Holter, DO      hydrOXYzine HCL  100 mg Oral Q6H PRN Max 4/day HOLLI Lion      Or    LORazepam  2 mg Intramuscular Q6H PRN HOLLI Lion      hydrOXYzine HCL  25 mg Oral Q6H PRN Max 4/day [facility] Holter, DO      ibuprofen  600 mg Oral Q8H PRN HOLLI Lion      influenza vaccine  0.5 mL Intramuscular Once Bora Rosario MD      loratadine  10 mg Oral Daily Brina Guillen MD      magnesium hydroxide  30 mL Oral Once Jourdan Dickens, DO      melatonin  3 mg Oral HS PRN Bora Rosario MD      melatonin  9 mg Oral HS Bora Rosario MD      methocarbamol  500 mg Oral Q6H PRN HOLLI Lion      multivitamin-minerals  1 tablet Oral Daily HOLLI Monge      nicotine polacrilex  2 mg Oral Q4H PRN Bora Rosario MD      OLANZapine  5 mg Oral Q4H PRN Max 3/day [facility] Holter, DO      Or    OLANZapine  2.5 mg Intramuscular Q4H PRN Max 3/day Ion C Holter, DO      OLANZapine  5 mg Oral Q3H PRN Max 3/day [facility] Holter, DO      Or    OLANZapine  5 mg Intramuscular Q3H PRN Max 3/day Ion FISCHER Holter, DO      OLANZapine  2.5 mg  Oral Q4H PRN Max 6/day Jordan C Holter, DO      ondansetron  4 mg Oral Q6H PRN HOLLI Lion      pantoprazole  20 mg Oral QAM Jordan C Holter, DO      polyethylene glycol  17 g Oral Daily PRN Sofi Yoo, HOLLI      polyethylene glycol  17 g Oral Daily Jourdan Dickens, DO      propranolol  10 mg Oral Q12H KAYLIE HOLLI Lion      saliva substitute  5 spray Mouth/Throat 4x Daily PRN Mary Jo Reich PA-C      senna-docusate sodium  1 tablet Oral Daily PRN Jordan C Holter, DO      senna-docusate sodium  2 tablet Oral HS Jourdan Dickens, DO      traZODone  50 mg Oral HS PRN HOLLI Lion      white petrolatum-mineral oil   Topical TID PRN HOLLI Lion      Xanomeline-Trospium Chloride  50 mg Oral BID Bora Rosario MD       Risks / Benefits of Treatment:    Risks, benefits, and possible side effects of medications explained to patient and patient verbalizes understanding and agreement for treatment.    Counseling / Coordination of Care:    Patient's progress discussed with staff in treatment team meeting.  Medications, treatment progress and treatment plan reviewed with patient.    Mary Jo Reich PA-C 01/18/25

## 2025-01-19 VITALS
HEIGHT: 66 IN | TEMPERATURE: 97.8 F | RESPIRATION RATE: 18 BRPM | OXYGEN SATURATION: 99 % | BODY MASS INDEX: 38.57 KG/M2 | DIASTOLIC BLOOD PRESSURE: 90 MMHG | HEART RATE: 87 BPM | SYSTOLIC BLOOD PRESSURE: 139 MMHG | WEIGHT: 240 LBS

## 2025-01-19 PROCEDURE — 99232 SBSQ HOSP IP/OBS MODERATE 35: CPT | Performed by: PSYCHIATRY & NEUROLOGY

## 2025-01-19 RX ADMIN — SENNOSIDES AND DOCUSATE SODIUM 2 TABLET: 50; 8.6 TABLET ORAL at 21:12

## 2025-01-19 RX ADMIN — NICOTINE POLACRILEX 2 MG: 2 GUM, CHEWING ORAL at 17:08

## 2025-01-19 RX ADMIN — MELATONIN TAB 3 MG 9 MG: 3 TAB at 21:12

## 2025-01-19 RX ADMIN — PROPRANOLOL HYDROCHLORIDE 10 MG: 10 TABLET ORAL at 08:37

## 2025-01-19 RX ADMIN — XANOMELINE AND TROSPIUM CHLORIDE 1 CAPSULE: 20; 50 CAPSULE, COATED PELLETS ORAL at 08:40

## 2025-01-19 RX ADMIN — DOCUSATE SODIUM 100 MG: 100 CAPSULE, LIQUID FILLED ORAL at 17:08

## 2025-01-19 RX ADMIN — XANOMELINE AND TROSPIUM CHLORIDE 1 CAPSULE: 20; 50 CAPSULE, COATED PELLETS ORAL at 17:08

## 2025-01-19 RX ADMIN — MULTIPLE VITAMINS W/ MINERALS TAB 1 TABLET: TAB ORAL at 08:38

## 2025-01-19 RX ADMIN — CLOZAPINE 250 MG: 25 TABLET ORAL at 21:12

## 2025-01-19 RX ADMIN — PANTOPRAZOLE SODIUM 20 MG: 20 TABLET, DELAYED RELEASE ORAL at 08:37

## 2025-01-19 RX ADMIN — NICOTINE POLACRILEX 2 MG: 2 GUM, CHEWING ORAL at 08:38

## 2025-01-19 RX ADMIN — NICOTINE POLACRILEX 2 MG: 2 GUM, CHEWING ORAL at 12:45

## 2025-01-19 RX ADMIN — ESCITALOPRAM OXALATE 20 MG: 10 TABLET, FILM COATED ORAL at 08:38

## 2025-01-19 RX ADMIN — DOCUSATE SODIUM 100 MG: 100 CAPSULE, LIQUID FILLED ORAL at 08:37

## 2025-01-19 RX ADMIN — HYDROCHLOROTHIAZIDE 12.5 MG: 12.5 TABLET ORAL at 08:37

## 2025-01-19 RX ADMIN — LORATADINE 10 MG: 10 TABLET ORAL at 08:37

## 2025-01-19 RX ADMIN — PROPRANOLOL HYDROCHLORIDE 10 MG: 10 TABLET ORAL at 21:11

## 2025-01-19 RX ADMIN — AMLODIPINE BESYLATE 5 MG: 5 TABLET ORAL at 08:37

## 2025-01-19 RX ADMIN — NICOTINE POLACRILEX 2 MG: 2 GUM, CHEWING ORAL at 21:11

## 2025-01-19 NOTE — ASSESSMENT & PLAN NOTE
Follows with medical  Continue famotidine 20 mg tablet BID per medical team   Continue pantoprazole EC 20 mg tablet every morning per medical team  This is a chronic condition, and is stable unless otherwise noted.      No associated orders from this encounter found during lookback period of 72 hours.

## 2025-01-19 NOTE — ASSESSMENT & PLAN NOTE
Reviewed on 01/18/25  Follows with medical team  Continue senna-docusate sodium dose per medical team.   Last BM 1/17/25

## 2025-01-19 NOTE — ASSESSMENT & PLAN NOTE
on nicotine gum as needed and encouraged smoking cessation  Continue to encourage cessation upon discharge      No associated orders from this encounter found during lookback period of 72 hours.

## 2025-01-19 NOTE — ASSESSMENT & PLAN NOTE
Reviewed on 1/18/25  on nicotine gum as needed and encouraged smoking cessation  Continue to encourage cessation upon discharge

## 2025-01-19 NOTE — ASSESSMENT & PLAN NOTE
Reviewed on 01/18/25  Follows with medical  Continue famotidine 20 mg tablet BID per medical team   Continue pantoprazole EC 20 mg tablet every morning per medical team  This is a chronic condition, and is stable unless otherwise noted.

## 2025-01-19 NOTE — ASSESSMENT & PLAN NOTE
No significant changes still with same threatening voices as before not commanding and planning to have a virtual visit with his sisters  Continue medication as follows:  Continue Clozapine 250 mg QHS for psychosis - increased 12/11/24  To consider further careful titration  CBC w/diff and CK every 2 weeks  CK was increased to 960 on 12/12 and oral hydration encouraged, CK improved to 905 on 12/13/24. Repeat on 12/17/24 decreased to 674 and on 12/20/24 decreased to 448  Labs  drawn on 12/24/2024, repeat  trending down  Labs drawn 01/15, ANC 4.01  Labs drawn 01/08,   Labs drawn 01/15   Continue Cobenfy 50/20 mg BID for psychosis  Hold off on increasing dose due to patient complaints of side effects. Continue to monitor for toleration. Currently reporting intermittent nausea and blurry vision when reading and xerostomia.   Abilify D/ferny  Continue Lexapro 20 mg daily for depression and anxiety  Robinul D/ferny on 01/13  Atropine D/ferny on 01/13  Continue Propanolol 10 mg BID for anxiety   Continue Melatonin 9 mg QHS for sleep  Continue Senna-Docusate sodium one 50 mg tablet QHS for constipation  Continue Zofran 4 mg tablet Q6H PRN for nausea   Start PRN mouth kote for xerostomia 1/18/25    Pt remains on dual antipsychotic Tx due to failure on monotherapy   .  S/p ECT treatments.  - ACT team referral was made for him living back with his aunt once MA funding comes through from the [facility] and sister will try to reapply      No associated orders from this encounter found during lookback period of 72 hours.

## 2025-01-19 NOTE — ASSESSMENT & PLAN NOTE
Reviewed on 01/18/25  No significant changes still with same threatening voices as before not commanding and planning to have a virtual visit with his sisters  Continue medication as follows:  Continue Clozapine 250 mg QHS for psychosis - increased 12/11/24  To consider further careful titration  CBC w/diff and CK every 2 weeks  CK was increased to 960 on 12/12 and oral hydration encouraged, CK improved to 905 on 12/13/24. Repeat on 12/17/24 decreased to 674 and on 12/20/24 decreased to 448  Labs  drawn on 12/24/2024, repeat  trending down  Labs drawn 01/15, ANC 4.01  Labs drawn 01/08,   Labs drawn 01/15   Continue Cobenfy 50/20 mg BID for psychosis  Hold off on increasing dose due to patient complaints of side effects. Continue to monitor for toleration. Currently reporting intermittent nausea and blurry vision when reading and xerostomia.   Abilify D/ferny  Continue Lexapro 20 mg daily for depression and anxiety  Robinul D/ferny on 01/13  Atropine D/ferny on 01/13  Continue Propanolol 10 mg BID for anxiety   Continue Melatonin 9 mg QHS for sleep  Continue Senna-Docusate sodium one 50 mg tablet QHS for constipation  Continue Zofran 4 mg tablet Q6H PRN for nausea   Start PRN mouth kote for xerostomia 1/18/25    Pt remains on dual antipsychotic Tx due to failure on monotherapy   .  S/p ECT treatments.  - ACT team referral was made for him living back with his aunt once MA funding comes through from the [facility] and sister will try to reapply

## 2025-01-19 NOTE — PLAN OF CARE
Problem: Alteration in Thoughts and Perception  Goal: Agree to be compliant with medication regime, as prescribed and report medication side effects  Description: Interventions:  - Offer appropriate PRN medication and supervise ingestion; conduct AIMS, as needed   Outcome: Progressing     Problem: Depression  Goal: Verbalize thoughts and feelings  Description: Interventions:  - Assess and re-assess patient's level of risk   - Engage patient in 1:1 interactions, daily, for a minimum of 15 minutes   - Encourage patient to express feelings, fears, frustrations, hopes   Outcome: Progressing  Goal: Refrain from harming self  Description: Interventions:  - Monitor patient closely, per order   - Supervise medication ingestion, monitor effects and side effects   Outcome: Progressing  Goal: Refrain from isolation  Description: Interventions:  - Develop a trusting relationship   - Encourage socialization   Outcome: Progressing  Goal: Refrain from self-neglect  Outcome: Progressing     Problem: Alteration in Thoughts and Perception  Goal: Treatment Goal: Gain control of psychotic behaviors/thinking, reduce/eliminate presenting symptoms and demonstrate improved reality functioning upon discharge  Outcome: Not Progressing  Goal: Refrain from acting on delusional thinking/internal stimuli  Description: Interventions:  - Monitor patient closely, per order   - Utilize least restrictive measures   - Set reasonable limits, give positive feedback for acceptable   - Administer medications as ordered and monitor of potential side effects  Outcome: Not Progressing  Goal: Recognize dysfunctional thoughts, communicate reality-based thoughts at the time of discharge  Description: Interventions:  - Provide medication and psycho-education to assist patient in compliance and developing insight into his/her illness   Outcome: Not Progressing     Problem: Depression  Goal: Treatment Goal: Demonstrate behavioral control of depressive symptoms,  verbalize feelings of improved mood/affect, and adopt new coping skills prior to discharge  Outcome: Not Progressing

## 2025-01-19 NOTE — NURSING NOTE
Pt is calm, cooperative and visible on milieu. Pt reports AH, depression and anxiety; denies SI/HI/VH. Compliant with medications and meals. Social with staff and select peers. Weekly wellness assessment completed and WNL. Q 15 min checks maintained.

## 2025-01-19 NOTE — ASSESSMENT & PLAN NOTE
Reviewed on 1/18/25  Follows with medical team and given dietary counseling and need for exercise

## 2025-01-19 NOTE — NURSING NOTE
Patient has been visible on the unit  social with select peers. Smiles when approached. Medication compliant. Offers no complaints this shift. Interacts with peers. Appetite ok. Group attendance 4/8. No c/o blurry vision or nausea. Utilizing Nicotine gum all day as ordered. Pleasant and cooperative. Denies suicidal ideations.

## 2025-01-19 NOTE — ASSESSMENT & PLAN NOTE
Follows with medical team  Continue senna-docusate sodium dose per medical team.   Last BM 1/17/25      No associated orders from this encounter found during lookback period of 72 hours.

## 2025-01-19 NOTE — PROGRESS NOTES
Psychiatric Progress Note - Department of Behavioral Health   Alberto Berumen 29 y.o. male MRN: [id number]  Unit/Bed#: formerly [facility] 112-02 Encounter: [id number]    ASSESSMENT & PLAN     Assessment & Plan  Schizoaffective disorder, bipolar type (HCC)    No significant changes still with same threatening voices as before not commanding and planning to have a virtual visit with his sisters  Continue medication as follows:  Continue Clozapine 250 mg QHS for psychosis - increased 12/11/24  To consider further careful titration  CBC w/diff and CK every 2 weeks  CK was increased to 960 on 12/12 and oral hydration encouraged, CK improved to 905 on 12/13/24. Repeat on 12/17/24 decreased to 674 and on 12/20/24 decreased to 448  Labs  drawn on 12/24/2024, repeat  trending down  Labs drawn 01/15, ANC 4.01  Labs drawn 01/08,   Labs drawn 01/15   Continue Cobenfy 50/20 mg BID for psychosis  Hold off on increasing dose due to patient complaints of side effects. Continue to monitor for toleration. Currently reporting intermittent nausea and blurry vision when reading and xerostomia.   Abilify D/ferny  Continue Lexapro 20 mg daily for depression and anxiety  Robinul D/ferny on 01/13  Atropine D/ferny on 01/13  Continue Propanolol 10 mg BID for anxiety   Continue Melatonin 9 mg QHS for sleep  Continue Senna-Docusate sodium one 50 mg tablet QHS for constipation  Continue Zofran 4 mg tablet Q6H PRN for nausea   Start PRN mouth kote for xerostomia 1/18/25    Pt remains on dual antipsychotic Tx due to failure on monotherapy   .  S/p ECT treatments.  - ACT team referral was made for him living back with his aunt once MA funding comes through from the [facility] and sister will try to reapply      No associated orders from this encounter found during lookback period of 72 hours.    Chronic idiopathic constipation    Follows with medical team  Continue senna-docusate sodium dose per medical team.   Last BM 1/17/25      No  associated orders from this encounter found during lookback period of 72 hours.  GERD (gastroesophageal reflux disease)    Follows with medical  Continue famotidine 20 mg tablet BID per medical team   Continue pantoprazole EC 20 mg tablet every morning per medical team  This is a chronic condition, and is stable unless otherwise noted.      No associated orders from this encounter found during lookback period of 72 hours.  Tobacco abuse    on nicotine gum as needed and encouraged smoking cessation  Continue to encourage cessation upon discharge      No associated orders from this encounter found during lookback period of 72 hours.  Elevated hemoglobin A1c    Follows with medical team      No associated orders from this encounter found during lookback period of 72 hours.  Class 2 obesity in adult    Follows with medical team and given dietary counseling and need for exercise      No associated orders from this encounter found during lookback period of 72 hours.    Primary hypertension    Follows with medical team on hydrochlorothiazide and Norvasc        No associated orders from this encounter found during lookback period of 72 hours.    Treatment Recommendations/Precautions:  Continue to promote patient participation in therapeutic milieu.  Continue medical management per medicine.  Continue previously prescribed psychotropic medication regimen; see below.  Continue behavioral health checks q.15 minutes.   Continue vitals per behavioral health unit protocol.  Discharge date per primary team    SUBJECTIVE     Patient evaluated this a.m. for continuity of care. Patient was discussed at length with nursing and treatment team. Per nursing, patient remains calm, cooperative, adherent to his medications less any acute adverse effects. No acute distress is noted throughout evaluation. Alberto Berumen denies suicidal/homicidal ideation in addition to thoughts of self-injury, receptive to crisis planning provided by this  writer, contacting for safety in the inpatient setting, admitting to an ability to appropriately confide in staff including this writer. Patient admits to depressed and anxious mood accompanied by negative auditory hallucinations, denying any additional psychiatric complaints/concerns    PSYCHIATRIC REVIEW OF SYSTEMS     Behavior over the last 24 hours:  unchanged  Sleep: adequate  Appetite: adequate  Medication side effects: none    REVIEW OF SYSTEMS   Review of systems: no complaints    OBJECTIVE     Vital Signs in Past 24 Hours:  Temp:  [97.8 °F (36.6 °C)] 97.8 °F (36.6 °C)  HR:  [80-89] 89  BP: (112-131)/(60-99) 131/99  Resp:  [18] 18  SpO2:  [98 %] 98 %  O2 Device: None (Room air)    Intake/Output in Past 24 hours:  No intake/output data recorded.  No intake/output data recorded.        Laboratory Results:  I have personally reviewed all pertinent laboratory/tests results.  Most Recent Labs:   Lab Results   Component Value Date    WBC 9.41 01/07/2025    RBC 5.49 01/07/2025    HGB 12.8 01/07/2025    HCT 43.0 01/07/2025     01/07/2025    RDW 14.1 01/07/2025    NEUTROABS 4.01 01/07/2025    SODIUM 140 10/16/2024    K 3.8 10/16/2024     10/16/2024    CO2 29 10/16/2024    BUN 9 10/16/2024    CREATININE 0.91 10/16/2024    GLUC 94 10/16/2024    GLUF 94 10/16/2024    CALCIUM 9.5 10/16/2024    AST 26 09/30/2024    ALT 42 09/30/2024    ALKPHOS 64 09/30/2024    TP 6.6 09/30/2024    ALB 3.8 09/30/2024    TBILI 0.47 09/30/2024    CHOLESTEROL 156 03/14/2024    HDL 44 03/14/2024    TRIG 133 03/14/2024    LDLCALC 85 03/14/2024    NONHDLC 112 03/14/2024    LITHIUM 0.61 01/09/2024    DEN6CSACNTQP 1.062 11/15/2023    HGBA1C 5.0 04/01/2024    EAG 97 04/01/2024       Behavioral Health Medications: all current active meds have been reviewed and current meds:   Current Facility-Administered Medications:     acetaminophen (TYLENOL) tablet 650 mg, Q4H PRN    acetaminophen (TYLENOL) tablet 650 mg, Q6H PRN     aluminum-magnesium hydroxide-simethicone (MAALOX) oral suspension 30 mL, Q4H PRN    amLODIPine (NORVASC) tablet 5 mg, Daily    Artificial Tears ophthalmic solution 1 drop, Q3H PRN    haloperidol lactate (HALDOL) injection 2.5 mg, Q4H PRN Max 4/day **AND** LORazepam (ATIVAN) injection 1 mg, Q4H PRN Max 4/day **AND** benztropine (COGENTIN) injection 0.5 mg, Q4H PRN Max 4/day    haloperidol lactate (HALDOL) injection 5 mg, Q4H PRN Max 4/day **AND** LORazepam (ATIVAN) injection 2 mg, Q4H PRN Max 4/day **AND** benztropine (COGENTIN) injection 1 mg, Q4H PRN Max 4/day    bisacodyl (DULCOLAX) rectal suppository 10 mg, Daily PRN    calcium carbonate (TUMS) chewable tablet 500 mg, BID PRN    cloZAPine (CLOZARIL) tablet 250 mg, HS    hydrOXYzine HCL (ATARAX) tablet 50 mg, Q6H PRN Max 4/day **OR** diphenhydrAMINE (BENADRYL) injection 50 mg, Q6H PRN    hydrOXYzine HCL (ATARAX) tablet 50 mg, Q6H PRN Max 4/day **OR** diphenhydrAMINE (BENADRYL) injection 50 mg, Q6H PRN    diphenhydrAMINE-zinc acetate (BENADRYL) 2-0.1 % cream, BID PRN    docusate sodium (COLACE) capsule 100 mg, BID    escitalopram (LEXAPRO) tablet 20 mg, Daily    famotidine (PEPCID) tablet 20 mg, BID PRN    fluticasone (FLONASE) 50 mcg/act nasal spray 1 spray, Daily PRN    haloperidol (HALDOL) tablet 1 mg, Q6H PRN    haloperidol (HALDOL) tablet 2.5 mg, Q4H PRN Max 4/day    haloperidol (HALDOL) tablet 5 mg, Q4H PRN Max 4/day    hydroCHLOROthiazide tablet 12.5 mg, Daily    hydrocortisone 1 % ointment, 4x Daily PRN    hydrOXYzine HCL (ATARAX) tablet 100 mg, Q6H PRN Max 4/day **OR** LORazepam (ATIVAN) injection 2 mg, Q6H PRN    hydrOXYzine HCL (ATARAX) tablet 100 mg, Q6H PRN Max 4/day **OR** LORazepam (ATIVAN) injection 2 mg, Q6H PRN    hydrOXYzine HCL (ATARAX) tablet 25 mg, Q6H PRN Max 4/day    ibuprofen (MOTRIN) tablet 600 mg, Q8H PRN    influenza vaccine (PF) (Fluarix) IM injection 0.5 mL, Once    loratadine (CLARITIN) tablet 10 mg, Daily    magnesium hydroxide (MILK  OF MAGNESIA) oral suspension 30 mL, Once    melatonin tablet 3 mg, HS PRN    melatonin tablet 9 mg, HS    methocarbamol (ROBAXIN) tablet 500 mg, Q6H PRN    multivitamin-minerals (CENTRUM) tablet 1 tablet, Daily    nicotine polacrilex (NICORETTE) gum 2 mg, Q4H PRN    OLANZapine (ZyPREXA) tablet 5 mg, Q4H PRN Max 3/day **OR** OLANZapine (ZyPREXA) IM injection 2.5 mg, Q4H PRN Max 3/day    OLANZapine (ZyPREXA) tablet 5 mg, Q3H PRN Max 3/day **OR** OLANZapine (ZyPREXA) IM injection 5 mg, Q3H PRN Max 3/day    OLANZapine (ZyPREXA) tablet 2.5 mg, Q4H PRN Max 6/day    ondansetron (ZOFRAN-ODT) dispersible tablet 4 mg, Q6H PRN    pantoprazole (PROTONIX) EC tablet 20 mg, QAM    polyethylene glycol (MIRALAX) packet 17 g, Daily PRN    polyethylene glycol (MIRALAX) packet 17 g, Daily    propranolol (INDERAL) tablet 10 mg, Q12H KAYLIE    saliva substitute (MOUTH KOTE) mucosal solution 5 spray, 4x Daily PRN    senna-docusate sodium (SENOKOT S) 8.6-50 mg per tablet 1 tablet, Daily PRN    senna-docusate sodium (SENOKOT S) 8.6-50 mg per tablet 2 tablet, HS    traZODone (DESYREL) tablet 50 mg, HS PRN    white petrolatum-mineral oil (EUCERIN,HYDROCERIN) cream, TID PRN    Xanomeline-Trospium Chloride 50-20 MG CAPS 1 capsule, BID.    Risks, benefits and possible side effects of Medications:   Risks, benefits, and possible side effects of medications explained to patient and patient verbalizes understanding.      Mental Status Evaluation:  Appearance:  age appropriate, casually dressed, and disheveled   Behavior:  psychomotor retardation   Speech:  soft and scant   Mood:  anxious, depressed, and dysthymic   Affect:  blunted   Language sparse   Thought Process:  goal directed   Thought Content:  Negative thinking   Perceptual Disturbances: Auditory hallucinations without commands   Risk Potential: Suicidal Ideations none, Homicidal Ideations none, and Potential for Aggression No   Sensorium:  person, place, and time/date   Cognition:  recent  and remote memory grossly intact   Consciousness:  alert and awake    Attention: attention span appeared shorter than expected for age   Insight:  limited   Judgment: limited   Intellect Not assessed   Gait/Station: normal gait/station and normal balance   Motor Activity: no abnormal movements     Memory: Short and long term memory  fair     Progress Toward Goals: unchanged, as evidenced by their participation (or lack thereof) in individual, social and therapeutic milieu in addition to adherence to their medication regimen.    Recommended Treatment:   See above for assessment and plan.    Inpatient Psychiatric Certification: Based upon physical, mental and social evaluations, I certify that inpatient psychiatric services are medically necessary for this patient for a duration of greater than 2 midnights for the treatment of Schizoaffective disorder, bipolar type (HCC) including psychotropic medication management, participation in the therapeutic milieu and referrals as indicated. Available alternative community resources do not meet the patient's mental health care needs. I further attest that an established written individualized plan of care has been implemented and is outlined in the patient's medical records.    Counseling/Coordination of Care    I have expended greater than 15 minutes in which more than 50% of this time was expended in counseling/coordination of patient care relating to diagnostic results, prognosis, potential risks and benefits of management options, instructions for appropriate management, patient and/or collateral education, importance of adherence to management and/or risk factor reductions. Patient's rights, confidentiality, exceptions to confidentiality, use of electronic medical record including appropriate staff access to medical record regarding behavioral health services and consent to treatment were reviewed.    Note Share:     This note was not shared with the patient due to  reasonable likelihood of causing patient harm     This note has been constructed using a voice recognition system. There may be translation, syntax,  or grammatical errors. If you have any questions, please contact the dictating provider.    Jordan Christopher Holter, DO 01/19/25

## 2025-01-20 PROCEDURE — 99232 SBSQ HOSP IP/OBS MODERATE 35: CPT | Performed by: PSYCHIATRY & NEUROLOGY

## 2025-01-20 RX ADMIN — MULTIPLE VITAMINS W/ MINERALS TAB 1 TABLET: TAB ORAL at 08:30

## 2025-01-20 RX ADMIN — ESCITALOPRAM OXALATE 20 MG: 10 TABLET, FILM COATED ORAL at 08:30

## 2025-01-20 RX ADMIN — NICOTINE POLACRILEX 2 MG: 2 GUM, CHEWING ORAL at 21:10

## 2025-01-20 RX ADMIN — XANOMELINE AND TROSPIUM CHLORIDE 1 CAPSULE: 20; 50 CAPSULE, COATED PELLETS ORAL at 17:13

## 2025-01-20 RX ADMIN — DOCUSATE SODIUM 100 MG: 100 CAPSULE, LIQUID FILLED ORAL at 17:14

## 2025-01-20 RX ADMIN — NICOTINE POLACRILEX 2 MG: 2 GUM, CHEWING ORAL at 08:28

## 2025-01-20 RX ADMIN — CLOZAPINE 250 MG: 25 TABLET ORAL at 21:09

## 2025-01-20 RX ADMIN — NICOTINE POLACRILEX 2 MG: 2 GUM, CHEWING ORAL at 16:45

## 2025-01-20 RX ADMIN — PROPRANOLOL HYDROCHLORIDE 10 MG: 10 TABLET ORAL at 21:09

## 2025-01-20 RX ADMIN — HYDROCHLOROTHIAZIDE 12.5 MG: 12.5 TABLET ORAL at 08:29

## 2025-01-20 RX ADMIN — SENNOSIDES AND DOCUSATE SODIUM 2 TABLET: 50; 8.6 TABLET ORAL at 21:09

## 2025-01-20 RX ADMIN — PROPRANOLOL HYDROCHLORIDE 10 MG: 10 TABLET ORAL at 08:28

## 2025-01-20 RX ADMIN — MELATONIN TAB 3 MG 9 MG: 3 TAB at 21:09

## 2025-01-20 RX ADMIN — NICOTINE POLACRILEX 2 MG: 2 GUM, CHEWING ORAL at 12:45

## 2025-01-20 RX ADMIN — XANOMELINE AND TROSPIUM CHLORIDE 1 CAPSULE: 20; 50 CAPSULE, COATED PELLETS ORAL at 08:28

## 2025-01-20 RX ADMIN — Medication 5 SPRAY: at 09:07

## 2025-01-20 RX ADMIN — LORATADINE 10 MG: 10 TABLET ORAL at 08:29

## 2025-01-20 RX ADMIN — DOCUSATE SODIUM 100 MG: 100 CAPSULE, LIQUID FILLED ORAL at 08:30

## 2025-01-20 RX ADMIN — PANTOPRAZOLE SODIUM 20 MG: 20 TABLET, DELAYED RELEASE ORAL at 08:29

## 2025-01-20 RX ADMIN — Medication 5 SPRAY: at 21:10

## 2025-01-20 NOTE — ASSESSMENT & PLAN NOTE
Reviewed on 01/20/25  Follows with medical team  Continue senna-docusate sodium dose per medical team.   Last BM 1/17/25

## 2025-01-20 NOTE — PROGRESS NOTES
01/20/25 0856   Team Meeting   Meeting Type Tx Team Meeting   Initial Conference Date 01/20/25   Next Conference Date 02/03/25   Team Members Present   Team Members Present Physician;Nurse;;Other (Discipline and Name)   Physician Team Member Luke Rosario   Nursing Team Member Mohsen   Social Work Team Member Jose J ORTEGA   Other (Discipline and Name) Jeremias MS   Patient/Family Present   Patient Present Yes   Patient's Family Present No   OTHER   Team Meeting - Additional Comments [facility] did not attend his meeting due to the holiday,.Patient shared his completed self-assessment. Patient discussed using his coping skills and working on his recovery. He discussed continued hearing voices and depression. Treatment team discussed [facility] funding status and possible intake in March. Patient will have appoinment at [facility] to obtain his ID.

## 2025-01-20 NOTE — PROGRESS NOTES
Progress Note - Behavioral Health   Name: Alberto Berumen 29 y.o. male I MRN: [id number]  Unit/Bed#: [facility] 112-02 I Date of Admission: 3/29/2024   Date of Service: 1/20/2025 I Hospital Day: 297     Assessment & Plan  Schizoaffective disorder, bipolar type (HCC)  Reviewed on 01/20/25  No significant changes still with same threatening voices as before not commanding and planning to have a virtual visit with his sisters  Continue medication as follows:  Continue Clozapine 250 mg QHS for psychosis - increased 12/11/24  To consider further careful titration  CBC w/diff and CK every 2 weeks  CK was increased to 960 on 12/12 and oral hydration encouraged, CK improved to 905 on 12/13/24. Repeat on 12/17/24 decreased to 674 and on 12/20/24 decreased to 448  Labs  drawn on 12/24/2024, repeat  trending down  Labs drawn 01/15, ANC 4.01  Labs drawn 01/08,   Labs drawn 01/15   Continue Cobenfy 50/20 mg BID for psychosis  Hold off on increasing dose due to patient complaints of side effects. Continue to monitor for toleration. Currently reporting intermittent nausea and blurry vision when reading and xerostomia.   Abilify D/ferny  Continue Lexapro 20 mg daily for depression and anxiety  Robinul D/ferny on 01/13  Atropine D/ferny on 01/13  Continue Propanolol 10 mg BID for anxiety   Continue Melatonin 9 mg QHS for sleep  Continue Senna-Docusate sodium one 50 mg tablet QHS for constipation  Continue Zofran 4 mg tablet Q6H PRN for nausea   Start PRN mouth kote for xerostomia 1/18/25    Pt remains on dual antipsychotic Tx due to failure on monotherapy   .  S/p ECT treatments.  - ACT team referral was made for him living back with his aunt once MA funding comes through from the [facility] and sister will try to reapply    Chronic idiopathic constipation  Reviewed on 01/20/25  Follows with medical team  Continue senna-docusate sodium dose per medical team.   Last BM 1/17/25  GERD (gastroesophageal reflux  disease)  Reviewed on 01/20/25  Follows with medical  Continue famotidine 20 mg tablet BID per medical team   Continue pantoprazole EC 20 mg tablet every morning per medical team  This is a chronic condition, and is stable unless otherwise noted.    Tobacco abuse  Reviewed on 01/20/25  on nicotine gum as needed and encouraged smoking cessation  Continue to encourage cessation upon discharge    Elevated hemoglobin A1c  Reviewed on 01/20/25  Follows with medical team  Class 2 obesity in adult  Reviewed on 01/20/25  Follows with medical team and given dietary counseling and need for exercise    Primary hypertension  Reviewed on 01/20/25  Follows with medical team on hydrochlorothiazide and Norvasc      Psychiatry Progress Note [facility]    Progress towards goals: moderate progress    Review of systems: denied    Psychiatric Diagnosis: Schizoaffective disorder, bipolar type    Assessment  Overall Status:  progressing, no significant changes  Certification Statement: The patient will continue to require additional inpatient hospital stay due to psychotic related symptoms and limited response to medications and ECT.        Medications: as above  Side effects from treatment: dry mouth  Medication changes: as above  Medication education: Risks side effects benefits and precautions of medications discussed with patient and they did verbalize an understanding about risks for metabolic syndrome from being on neuroleptics and tardive dyskinesia etc.  All medications reviewed and I recommend that they be continued for symptom management  Understanding of medications: Risks side effects benefits and precautions of medications discussed with patient and they did verbalize an understanding about risks for metabolic syndrome from being on neuroleptics and tardive dyskinesia etc., and patient appeared to have understanding.  Justification for dual anti-psychotics: due to not responding to single  "antipsychotic    Non-pharmacological treatments  Continue with individual, group, milieu and occupational therapy using recovery principles and psycho-education about accepting illness and the need for treatment.  Behavioral health checks every 7 minutes    Safety  Safety and communication plan established to target dynamic risk factors discussed above.    Discharge Plan   To his Aunt with [facility] for ACT when received     Interval Progress:   Per treatment team, Alberto continues to report auditory hallucinations that say they are going to kill him but denies any command hallucinations. He is visible, pleasant, and helpful on the unit. His last bowel movement and shower was on the 19th. He attended 5/8 groups on Friday. He has an upcoming penndot appointment on Wednesday.    Today, Alberto reports his mood as \"stable\". He said his goals for the day are to work out. He said that his weekend was alright and he played some ping pong. He stated that he continues to have issues with a dry mouth but that his nausea has completely resolved. He said his sleep and appetite are stable. He denied any SI/HI or passive thoughts of death. He reports continued auditory hallucinations that say they are going to kill him when he leaves here. He denies command hallucinations or the hallucinations involving his family.       Acceptance by patient: accepts inpatient treatment and medications  Hopefulness in recovery: to live with his aunt in the community  Involved in reintegration process: communicates with siblings and aunt   Trusting in relationship with psychiatrist: trusting  Sleep: good  Appetite: good  Compliance with Medications: compliant  Group attendance: 5/8  Significant events: none in the last 24 hours     Mental Status Exam  Appearance: age appropriate, causally dressed in hospital pants and a sweatshirt with the restrepo pulled up, laying in bed resting, adequate hygiene  Behavior: calm, cooperative, good eye " "contact, polite   Speech: normal rate, normal volume, normal pitch, clear, coherent, not pressured or hyper-verbal  Mood: \"stable\"  Affect: constricted, mood-congruent  Thought Process: organized, logical, coherent, goal directed, linear  Thought Content: mild paranoia, intrusive thoughts  Perceptual Disturbances:  auditory hallucinations of voices that say they are going to kill him when he leaves here. Denies command hallucinations. Denies involvement of family and says hallucinations only involve him. No visual hallucinations. Not observed responding to internal stimuli or appearing preoccupied.   Risk Potential: Suicidal Ideations none, Homicidal Ideations none, and Potential for Aggression No  Sensorium: alert and oriented to person, place, time and situation  Cognition: recent and remote memory grossly intact  Consciousness: alert and awake  Attention: attention span and concentration are age appropriate  Intellect: appears to be of average intelligence  Insight: intact  Judgement: intact  Motor Activity: no abnormal movements     Vitals  Temp:  [96.7 °F (35.9 °C)] 96.7 °F (35.9 °C)  HR:  [77-87] 77  Resp:  [18] 18  BP: (111-139)/(68-90) 111/68  SpO2:  [99 %] 99 %  No intake or output data in the 24 hours ending 01/20/25 1057    Lab Results: All Labs For Current Hospital Admission Reviewed        Current Facility-Administered Medications   Medication Dose Route Frequency Provider Last Rate    acetaminophen  650 mg Oral Q4H PRN Jordan C Holter,       acetaminophen  650 mg Oral Q6H PRN HOLLI Lion      aluminum-magnesium hydroxide-simethicone  30 mL Oral Q4H PRN Jordan C Holter,       amLODIPine  5 mg Oral Daily HOLLI Lion      Artificial Tears  1 drop Both Eyes Q3H PRN Jordan C Holter,       haloperidol lactate  2.5 mg Intramuscular Q4H PRN Max 4/day HOLLI Lion      And    LORazepam  1 mg Intramuscular Q4H PRN Max 4/day HOLLI Lion      And    benztropine  0.5 mg " Intramuscular Q4H PRN Max 4/day HOLLI Lion      haloperidol lactate  5 mg Intramuscular Q4H PRN Max 4/day HOLLI Lion      And    LORazepam  2 mg Intramuscular Q4H PRN Max 4/day HOLLI Lion      And    benztropine  1 mg Intramuscular Q4H PRN Max 4/day HOLLI Lion      bisacodyl  10 mg Rectal Daily PRN HOLLI Lion      calcium carbonate  500 mg Oral BID PRN HOLLI Monge      cloZAPine  250 mg Oral HS Bora Rosario MD      hydrOXYzine HCL  50 mg Oral Q6H PRN Max 4/day Jordan C Holter, DO      Or    diphenhydrAMINE  50 mg Intramuscular Q6H PRN Jordan C Holter,       hydrOXYzine HCL  50 mg Oral Q6H PRN Max 4/day HOLLI Lion      Or    diphenhydrAMINE  50 mg Intramuscular Q6H PRN HOLLI Lion      diphenhydrAMINE-zinc acetate   Topical BID PRN HOLLI Lion      docusate sodium  100 mg Oral BID Jourdan Dickens,       escitalopram  20 mg Oral Daily HOLLI Lion      famotidine  20 mg Oral BID PRN HOLLI Monge      fluticasone  1 spray Each Nare Daily PRN HOLLI Monge      haloperidol  1 mg Oral Q6H PRN HOLLI iLon      haloperidol  2.5 mg Oral Q4H PRN Max 4/day HOLLI Lion      haloperidol  5 mg Oral Q4H PRN Max 4/day HOLLI Lion      hydroCHLOROthiazide  12.5 mg Oral Daily Pia Mantilla, HOLLI      hydrocortisone   Topical 4x Daily PRN HOLLI Lion      hydrOXYzine HCL  100 mg Oral Q6H PRN Max 4/day Jordan C Holter, DO      Or    LORazepam  2 mg Intramuscular Q6H PRN Jordan C Holter, DO      hydrOXYzine HCL  100 mg Oral Q6H PRN Max 4/day HOLLI Lion      Or    LORazepam  2 mg Intramuscular Q6H PRN HOLLI Lion      hydrOXYzine HCL  25 mg Oral Q6H PRN Max 4/day Jordan C Holter, DO      ibuprofen  600 mg Oral Q8H PRN HOLLI Lion      influenza vaccine  0.5 mL Intramuscular Once Bora Rosario MD      loratadine  10 mg Oral Daily Brina Guillen MD      magnesium  hydroxide  30 mL Oral Once Jourdan Dickens, DO      melatonin  3 mg Oral HS PRN Bora Rosario MD      melatonin  9 mg Oral HS Bora Rosario MD      methocarbamol  500 mg Oral Q6H PRN HOLLI Lion      multivitamin-minerals  1 tablet Oral Daily Eileen GonzalezmiryamjaylenHOLLI      nicotine polacrilex  2 mg Oral Q4H PRN Bora Rosario MD      OLANZapine  5 mg Oral Q4H PRN Max 3/day [facility] Holter, DO      Or    OLANZapine  2.5 mg Intramuscular Q4H PRN Max 3/day [facility] Holter, DO      OLANZapine  5 mg Oral Q3H PRN Max 3/day [facility] Holter, DO      Or    OLANZapine  5 mg Intramuscular Q3H PRN Max 3/day [facility] Holter, DO      OLANZapine  2.5 mg Oral Q4H PRN Max 6/day [facility] Holter, DO      ondansetron  4 mg Oral Q6H PRN HOLLI Lion      pantoprazole  20 mg Oral QAM [facility] Holter, DO      polyethylene glycol  17 g Oral Daily PRN HOLLI Ghotra      polyethylene glycol  17 g Oral Daily Jourdan Dickens, DO      propranolol  10 mg Oral Q12H KAYLIE HOLLI Lion      saliva substitute  5 spray Mouth/Throat 4x Daily PRN Mary Jo Reich PA-C      senna-docusate sodium  1 tablet Oral Daily PRN [facility] Holter, DO      senna-docusate sodium  2 tablet Oral HS Jourdan Dickens, DO      traZODone  50 mg Oral HS PRN HOLLI Lion      white petrolatum-mineral oil   Topical TID PRN HOLLI Lion      Xanomeline-Trospium Chloride  50 mg Oral BID Bora Rosario MD         Counseling / Coordination of Care: Total floor / unit time spent today 15 minutes. Greater than 50% of total time was spent with the patient and / or family counseling and / or somewhat receptive to supportive listening and teaching positive coping skills to deal with symptom mangement.     Patient's Rights, confidentiality and exceptions to confidentiality, use of automated medical record, Behavioral Health Services staff access to medical record, and consent to treatment reviewed.    This note has been dictated and hence there  may be problems with punctuation, spelling and formatting and if anyone has any concerns please address them to Dr. Rosario.   This note is not shared with patient due to potential for making patient's condition worse by knowing the content of the note.    Written by Leidy WALLACE and supervised by Dr. Rosario

## 2025-01-20 NOTE — ASSESSMENT & PLAN NOTE
Reviewed on 01/20/25  on nicotine gum as needed and encouraged smoking cessation  Continue to encourage cessation upon discharge

## 2025-01-20 NOTE — ASSESSMENT & PLAN NOTE
Reviewed on 01/20/25  No significant changes still with same threatening voices as before not commanding and planning to have a virtual visit with his sisters  Continue medication as follows:  Continue Clozapine 250 mg QHS for psychosis - increased 12/11/24  To consider further careful titration  CBC w/diff and CK every 2 weeks  CK was increased to 960 on 12/12 and oral hydration encouraged, CK improved to 905 on 12/13/24. Repeat on 12/17/24 decreased to 674 and on 12/20/24 decreased to 448  Labs  drawn on 12/24/2024, repeat  trending down  Labs drawn 01/15, ANC 4.01  Labs drawn 01/08,   Labs drawn 01/15   Continue Cobenfy 50/20 mg BID for psychosis  Hold off on increasing dose due to patient complaints of side effects. Continue to monitor for toleration. Currently reporting intermittent nausea and blurry vision when reading and xerostomia.   Abilify D/ferny  Continue Lexapro 20 mg daily for depression and anxiety  Robinul D/ferny on 01/13  Atropine D/ferny on 01/13  Continue Propanolol 10 mg BID for anxiety   Continue Melatonin 9 mg QHS for sleep  Continue Senna-Docusate sodium one 50 mg tablet QHS for constipation  Continue Zofran 4 mg tablet Q6H PRN for nausea   Start PRN mouth kote for xerostomia 1/18/25    Pt remains on dual antipsychotic Tx due to failure on monotherapy   .  S/p ECT treatments.  - ACT team referral was made for him living back with his aunt once MA funding comes through from the [facility] and sister will try to reapply

## 2025-01-20 NOTE — ASSESSMENT & PLAN NOTE
Reviewed on 01/20/25  Follows with medical team and given dietary counseling and need for exercise

## 2025-01-20 NOTE — NURSING NOTE
Pt is visible on the unit and social with select peers. Consumed 100% of dinner. Took medications without incidence. Reports feeling better on new medication, Cobenfy, only complains of continued dry mouth but encouraged to use mouth kote as needed. Pt is pleasant and cooperative. Attended 8/9 groups. Reports anxiety, depression, and threatening AH. Denies SI/HI/VH. He uses his coping skills appropriately. No behavioral issues. Pt offers no complaints.

## 2025-01-20 NOTE — PROGRESS NOTES
12/19/24 0827   Team Meeting   Meeting Type Daily Rounds   Team Members Present   Team Members Present Physician;Nurse;;Other (Discipline and Name)   Physician Team Member Thomas, Holter, Spoleti   Nursing Team Member Claudia   Social Work Team Member Jose J ORTEGA   Other (Discipline and Name) Wang [facility]   Patient/Family Present   Patient Present No   Patient's Family Present No     Groups Participation  7/9.   Patient's compliant with medications. He's engaged in his treatment. He's appropriate and socializes with his peers. Uses coping skills to manage his symptoms.    Mood disorder

## 2025-01-20 NOTE — PLAN OF CARE
Problem: Alteration in Thoughts and Perception  Goal: Refrain from acting on delusional thinking/internal stimuli  Description: Interventions:  - Monitor patient closely, per order   - Utilize least restrictive measures   - Set reasonable limits, give positive feedback for acceptable   - Administer medications as ordered and monitor of potential side effects  Outcome: Not Progressing  Goal: Agree to be compliant with medication regime, as prescribed and report medication side effects  Description: Interventions:  - Offer appropriate PRN medication and supervise ingestion; conduct AIMS, as needed   Outcome: Progressing  Goal: Recognize dysfunctional thoughts, communicate reality-based thoughts at the time of discharge  Description: Interventions:  - Provide medication and psycho-education to assist patient in compliance and developing insight into his/her illness   Outcome: Not Progressing     Problem: Ineffective Coping  Goal: Identifies ineffective coping skills  Outcome: Not Progressing  Goal: Identifies healthy coping skills  Outcome: Not Progressing  Goal: Demonstrates healthy coping skills  Outcome: Not Progressing  Goal: Participates in unit activities  Description: Interventions:  - Provide therapeutic environment   - Provide required programming   - Redirect inappropriate behaviors   Outcome: Progressing  Goal: Patient/Family participate in treatment and DC plans  Description: Interventions:  - Provide therapeutic environment  Outcome: Not Progressing  Goal: Patient/Family verbalizes awareness of resources  Outcome: Not Progressing     Problem: Depression  Goal: Verbalize thoughts and feelings  Description: Interventions:  - Assess and re-assess patient's level of risk   - Engage patient in 1:1 interactions, daily, for a minimum of 15 minutes   - Encourage patient to express feelings, fears, frustrations, hopes   Outcome: Not Progressing  Goal: Refrain from harming self  Description: Interventions:  -  Monitor patient closely, per order   - Supervise medication ingestion, monitor effects and side effects   Outcome: Progressing  Goal: Refrain from isolation  Description: Interventions:  - Develop a trusting relationship   - Encourage socialization   Outcome: Progressing     Problem: Anxiety  Goal: Anxiety is at manageable level  Description: Interventions:  - Assess and monitor patient's anxiety level.   - Monitor for signs and symptoms (heart palpitations, chest pain, shortness of breath, headaches, nausea, feeling jumpy, restlessness, irritable, apprehensive).   - Collaborate with interdisciplinary team and initiate plan and interventions as ordered.  - [place] patient to unit/surroundings  - Explain treatment plan  - Encourage participation in care  - Encourage verbalization of concerns/fears  - Identify coping mechanisms  - Assist in developing anxiety-reducing skills  - Administer/offer alternative therapies  - Limit or eliminate stimulants  Outcome: Not Progressing     Problem: Alteration in Orientation  Goal: Interact with staff daily  Description: Interventions:  - Assess and re-assess patient's level of orientation  - Engage patient in 1 on 1 interactions, daily, for a minimum of 15 minutes   - Establish rapport/trust with patient   Outcome: Progressing  Goal: Express concerns related to confused thinking related to:  Description: Interventions:  - Encourage patient to express feelings, fears, frustrations, hopes  - Assign consistent caregivers   - [place]/re-orient patient as needed  - Allow comfort items, as appropriate  - Provide visual cues, signs, etc.   Outcome: Not Progressing  Goal: Allow medical examinations, as recommended  Description: Interventions:  - Provide physical/neurological exams and/or referrals, per provider   Outcome: Progressing  Goal: Cooperate with recommended testing/procedures  Description: Interventions:  - Determine need for ancillary testing  - Observe for mental status  changes  - Implement falls/precaution protocol   Outcome: Progressing  Goal: Complete daily ADLs, including personal hygiene independently, as able  Description: Interventions:  - Observe, teach, and assist patient with ADLS  Outcome: Not Progressing     Problem: DISCHARGE PLANNING  Goal: Discharge to home with appropriate resources  Description: INTERVENTIONS:  - Identify barriers to discharge w/patient and caregiver  - Arrange for needed discharge resources and transportation as appropriate  - Identify discharge learning needs (meds, wound care, etc.)  - Refer to Case Management Department for coordinating discharge planning if the patient needs post-hospital services based on physician/advanced practitioner order or complex needs related to functional status, cognitive ability, or social support system  Outcome: Not Progressing

## 2025-01-20 NOTE — ASSESSMENT & PLAN NOTE
Reviewed on 01/20/25  Follows with medical  Continue famotidine 20 mg tablet BID per medical team   Continue pantoprazole EC 20 mg tablet every morning per medical team  This is a chronic condition, and is stable unless otherwise noted.

## 2025-01-20 NOTE — NURSING NOTE
Patient has been visible on the unit all shift. Social and pleasant. Interacts with others. Appetite good. Continues to have noncommand auditory hallucinations. Verbal support offered. No c./o blurry vision today. Denies suicidal ideations. Medication compliant. Observed talking on the phone several times. Attended 1 group today. Encouraged to attend.

## 2025-01-20 NOTE — NURSING NOTE
Patient sleeping in his bed. Breathing adequately. No distress observed.  Offers no current complaints.

## 2025-01-20 NOTE — NURSING NOTE
Pt is calm, cooperative and visible on milieu. Pt reports AH, depression and anxiety; denies SI/HI/VH. Compliant with scheduled medications and meals. Social with staff and select peers. Able to make needs known. Q 15 min checks maintained.

## 2025-01-20 NOTE — PROGRESS NOTES
01/20/25 0849   Team Meeting   Meeting Type Daily Rounds   Team Members Present   Team Members Present Physician;Nurse;;Other (Discipline and Name)   Physician Team Member Thomas, Holter   Nursing Team Member Mohsen   Social Work Team Member Jose J ORTEGA   Other (Discipline and Name) Wang [facility]   Patient/Family Present   Patient Present No   Patient's Family Present No     Groups Participation  5/8  He's compliant with medications. [facility] appointment on 1/22. He's appropriate and engaged with his peers.

## 2025-01-21 LAB
BASOPHILS # BLD AUTO: 0.03 THOUSANDS/ÂΜL (ref 0–0.1)
BASOPHILS NFR BLD AUTO: 0 % (ref 0–1)
CK SERPL-CCNC: 175 U/L (ref 39–308)
EOSINOPHIL # BLD AUTO: 0.29 THOUSAND/ÂΜL (ref 0–0.61)
EOSINOPHIL NFR BLD AUTO: 3 % (ref 0–6)
ERYTHROCYTE [DISTWIDTH] IN BLOOD BY AUTOMATED COUNT: 14 % (ref 11.6–15.1)
HCT VFR BLD AUTO: 42.7 % (ref 36.5–49.3)
HGB BLD-MCNC: 12.8 G/DL (ref 12–17)
IMM GRANULOCYTES # BLD AUTO: 0.02 THOUSAND/UL (ref 0–0.2)
IMM GRANULOCYTES NFR BLD AUTO: 0 % (ref 0–2)
LYMPHOCYTES # BLD AUTO: 3.91 THOUSANDS/ÂΜL (ref 0.6–4.47)
LYMPHOCYTES NFR BLD AUTO: 44 % (ref 14–44)
MCH RBC QN AUTO: 23.1 PG (ref 26.8–34.3)
MCHC RBC AUTO-ENTMCNC: 30 G/DL (ref 31.4–37.4)
MCV RBC AUTO: 77 FL (ref 82–98)
MONOCYTES # BLD AUTO: 0.73 THOUSAND/ÂΜL (ref 0.17–1.22)
MONOCYTES NFR BLD AUTO: 8 % (ref 4–12)
NEUTROPHILS # BLD AUTO: 3.95 THOUSANDS/ÂΜL (ref 1.85–7.62)
NEUTS SEG NFR BLD AUTO: 45 % (ref 43–75)
NRBC BLD AUTO-RTO: 0 /100 WBCS
PLATELET # BLD AUTO: 244 THOUSANDS/UL (ref 149–390)
PMV BLD AUTO: 10.9 FL (ref 8.9–12.7)
RBC # BLD AUTO: 5.53 MILLION/UL (ref 3.88–5.62)
WBC # BLD AUTO: 8.93 THOUSAND/UL (ref 4.31–10.16)

## 2025-01-21 PROCEDURE — 85025 COMPLETE CBC W/AUTO DIFF WBC: CPT | Performed by: PSYCHIATRY & NEUROLOGY

## 2025-01-21 PROCEDURE — 99232 SBSQ HOSP IP/OBS MODERATE 35: CPT | Performed by: PSYCHIATRY & NEUROLOGY

## 2025-01-21 PROCEDURE — 82550 ASSAY OF CK (CPK): CPT | Performed by: PSYCHIATRY & NEUROLOGY

## 2025-01-21 RX ADMIN — MULTIPLE VITAMINS W/ MINERALS TAB 1 TABLET: TAB ORAL at 08:23

## 2025-01-21 RX ADMIN — DOCUSATE SODIUM 100 MG: 100 CAPSULE, LIQUID FILLED ORAL at 17:03

## 2025-01-21 RX ADMIN — PANTOPRAZOLE SODIUM 20 MG: 20 TABLET, DELAYED RELEASE ORAL at 08:23

## 2025-01-21 RX ADMIN — CLOZAPINE 250 MG: 25 TABLET ORAL at 21:10

## 2025-01-21 RX ADMIN — SENNOSIDES AND DOCUSATE SODIUM 2 TABLET: 50; 8.6 TABLET ORAL at 21:10

## 2025-01-21 RX ADMIN — XANOMELINE AND TROSPIUM CHLORIDE 1 CAPSULE: 20; 50 CAPSULE, COATED PELLETS ORAL at 17:03

## 2025-01-21 RX ADMIN — NICOTINE POLACRILEX 2 MG: 2 GUM, CHEWING ORAL at 08:23

## 2025-01-21 RX ADMIN — NICOTINE POLACRILEX 2 MG: 2 GUM, CHEWING ORAL at 16:48

## 2025-01-21 RX ADMIN — PROPRANOLOL HYDROCHLORIDE 10 MG: 10 TABLET ORAL at 21:10

## 2025-01-21 RX ADMIN — XANOMELINE AND TROSPIUM CHLORIDE 1 CAPSULE: 20; 50 CAPSULE, COATED PELLETS ORAL at 08:22

## 2025-01-21 RX ADMIN — NICOTINE POLACRILEX 2 MG: 2 GUM, CHEWING ORAL at 12:48

## 2025-01-21 RX ADMIN — DOCUSATE SODIUM 100 MG: 100 CAPSULE, LIQUID FILLED ORAL at 08:23

## 2025-01-21 RX ADMIN — MELATONIN TAB 3 MG 9 MG: 3 TAB at 21:09

## 2025-01-21 RX ADMIN — LORATADINE 10 MG: 10 TABLET ORAL at 08:22

## 2025-01-21 RX ADMIN — ESCITALOPRAM OXALATE 20 MG: 10 TABLET, FILM COATED ORAL at 08:23

## 2025-01-21 RX ADMIN — NICOTINE POLACRILEX 2 MG: 2 GUM, CHEWING ORAL at 21:10

## 2025-01-21 NOTE — NURSING NOTE
Received pt in bed at change of shift with eyes closed; chest movement noted.  Continues the same thus this far as per q 15 min room checks.   Will continue to monitor behavior, sleeping pattern and any medical issues that may arise.    0600:  Sleeping 7+ hrs thus this far.  Lab obtained as ordered and delivered to our lab.

## 2025-01-21 NOTE — PROGRESS NOTES
Progress Note - Behavioral Health   Name: Alberto Berumen 29 y.o. male I MRN: [id number]  Unit/Bed#: [facility] 112-02 I Date of Admission: 3/29/2024   Date of Service: 1/21/2025 I Hospital Day: 298     Assessment & Plan  Schizoaffective disorder, bipolar type (HCC)  Reviewed on 01/21/25  No significant changes still with same threatening voices as before not commanding and planning to have a virtual visit with his sisters  Continue medication as follows:  Continue Clozapine 250 mg QHS for psychosis - increased 12/11/24 01/21: ANC 3.95,   To consider further careful titration  CBC w/diff and CK every 2 weeks  CK was increased to 960 on 12/12 and oral hydration encouraged, CK improved to 905 on 12/13/24. Repeat on 12/17/24 decreased to 674 and on 12/20/24 decreased to 448  Labs  drawn on 12/24/2024, repeat  trending down  Labs drawn 01/15, ANC 4.01  Labs drawn 01/08,   Labs drawn 01/15   Continue Cobenfy 50/20 mg BID for psychosis  Hold off on increasing dose due to patient complaints of side effects. Continue to monitor for toleration. Currently reporting intermittent nausea and blurry vision when reading and xerostomia.   Abilify D/ferny  Continue Lexapro 20 mg daily for depression and anxiety  Robinul D/ferny on 01/13  Atropine D/ferny on 01/13  Continue Propanolol 10 mg BID for anxiety   Continue Melatonin 9 mg QHS for sleep  Continue Senna-Docusate sodium one 50 mg tablet QHS for constipation  Continue Zofran 4 mg tablet Q6H PRN for nausea   Start PRN mouth kote for xerostomia 1/18/25    Pt remains on dual antipsychotic Tx due to failure on monotherapy   .  S/p ECT treatments.  - ACT team referral was made for him living back with his aunt once MA funding comes through from the [facility] and sister will try to reapply    Chronic idiopathic constipation  Reviewed on 01/21/25  Follows with medical team  Continue senna-docusate sodium dose per medical team.   Last BM 1/17/25  GERD  (gastroesophageal reflux disease)  Reviewed on 01/21/25  Follows with medical  Continue famotidine 20 mg tablet BID per medical team   Continue pantoprazole EC 20 mg tablet every morning per medical team  This is a chronic condition, and is stable unless otherwise noted.    Tobacco abuse  Reviewed on 01/21/25  on nicotine gum as needed and encouraged smoking cessation  Continue to encourage cessation upon discharge    Elevated hemoglobin A1c  Reviewed on 01/21/25  Follows with medical team  Class 2 obesity in adult  Reviewed on 01/21/25  Follows with medical team and given dietary counseling and need for exercise    Primary hypertension  Reviewed on 01/21/25  Follows with medical team on hydrochlorothiazide and Norvasc      Psychiatry Progress Note [facility]    Progress towards goals: moderate progress    Review of systems: denied    Psychiatric Diagnosis: Schizoaffective disorder, bipolar type     Assessment  Overall Status:  progressing, no significant changes   Certification Statement: The patient will continue to require additional inpatient hospital stay due to psychotic related symptoms and limited response to medications and ECT.         Medications: as above  Side effects from treatment: dry mouth   Medication changes: as above  Medication education: Risks side effects benefits and precautions of medications discussed with patient and they did verbalize an understanding about risks for metabolic syndrome from being on neuroleptics and tardive dyskinesia etc.  All medications reviewed and I recommend that they be continued for symptom management  Understanding of medications: Risks side effects benefits and precautions of medications discussed with patient and they did verbalize an understanding about risks for metabolic syndrome from being on neuroleptics and tardive dyskinesia etc., and patient appeared to have understanding.  Justification for dual anti-psychotics: due to not  "responding to single antipsychotic     Non-pharmacological treatments  Continue with individual, group, milieu and occupational therapy using recovery principles and psycho-education about accepting illness and the need for treatment.  Behavioral health checks every 7 minutes    Safety  Safety and communication plan established to target dynamic risk factors discussed above.    Discharge Plan   To his Aunt with [facility] for ACT when received     Interval Progress:   Per treatment team, Alberto continues to be pleasant, cooperative, and helpful on the unit. He continues to endorse auditory hallucinations to staff. He had labs drawn this morning with his ANC 3.95 and . He attended 8/9 groups yesterday.    Today, Alberto reports his mood as \"alright\". He said his goals for the day are to work out. He stated that the side effects that he has been experiencing from his new medication, including dry mouth and blurry vision, have been improving. He said his sleep and his appetite are stable. He denied SI/HI, or passive thoughts of death. He reports continued auditory hallucinations that say they are going to kill him when he leaves here. He denies command hallucinations or the hallucinations involving his family or friends.     Acceptance by patient: accepts inpatient treatment and medications   Hopefulness in recovery: to live with his aunt in the community  Involved in reintegration process: communicates with siblings and aunt   Trusting in relationship with psychiatrist: trusting   Sleep: good  Appetite: good  Compliance with Medications: compliant  Group attendance: 8/9 groups   Significant events: none in the last 24 hours     Mental Status Exam  Appearance: age appropriate, casually dressed in hospital pants and a sweatshirt with the restrepo pulled up, laying in bed asleep with a blanket pulled over his face  Behavior: calm, cooperative, no eye contact, polite, difficulty awaking from sleep, kept blanket " "over his face during conversation   Speech: normal rate, normal volume, normal pitch, clear, coherent, not pressured or hyper-verbal   Mood: \"alright\"  Affect: constricted, mood-congruent  Thought Process: organized, logical, coherent, goal directed, linear  Thought Content: no overt delusions, mild paranoia, intrusive thoughts  Perceptual Disturbances:  auditory hallucinations of voices that say they are going to kill him when he leaves here.  Denies involvement of family in hallucinations. Denies command hallucinations. Denies visual hallucinations. Not observed responding to internal stimuli or appearing preoccupied.   Risk Potential: Suicidal Ideations none, Homicidal Ideations none, and Potential for Aggression No  Sensorium: alert and oriented to person, place, time and situation  Cognition: recent and remote memory grossly intact  Consciousness: alert and awake  Attention: attention span and concentration are age appropriate  Intellect: appears to be of average intelligence  Insight: intact  Judgement: intact  Motor Activity: no abnormal movements     Vitals  Temp:  [97.6 °F (36.4 °C)-97.7 °F (36.5 °C)] 97.6 °F (36.4 °C)  HR:  [82-95] 82  Resp:  [18] 18  BP: (104-120)/(66-76) 104/66  SpO2:  [99 %-100 %] 99 %  No intake or output data in the 24 hours ending 01/21/25 1020    Lab Results: All Labs For Current Hospital Admission Reviewed  Results from last 7 days   Lab Units 01/21/25  0536   WBC Thousand/uL 8.93   RBC Million/uL 5.53   HEMOGLOBIN g/dL 12.8   HEMATOCRIT % 42.7   MCV fL 77*   PLATELETS Thousands/uL 244   TOTAL NEUT ABS Thousands/µL 3.95       Current Facility-Administered Medications   Medication Dose Route Frequency Provider Last Rate    acetaminophen  650 mg Oral Q4H PRN Jordan C Holter,       acetaminophen  650 mg Oral Q6H PRN HOLLI Lion      aluminum-magnesium hydroxide-simethicone  30 mL Oral Q4H PRN Jordan C Holter, DO      amLODIPine  5 mg Oral Daily HOLLI Lion      " Artificial Tears  1 drop Both Eyes Q3H PRN Jordan C Holter, DO      haloperidol lactate  2.5 mg Intramuscular Q4H PRN Max 4/day HOLLI Lion      And    LORazepam  1 mg Intramuscular Q4H PRN Max 4/day HOLLI Lion      And    benztropine  0.5 mg Intramuscular Q4H PRN Max 4/day HOLLI Lion      haloperidol lactate  5 mg Intramuscular Q4H PRN Max 4/day HOLLI Lion      And    LORazepam  2 mg Intramuscular Q4H PRN Max 4/day HOLLI Lion      And    benztropine  1 mg Intramuscular Q4H PRN Max 4/day HOLLI Lion      bisacodyl  10 mg Rectal Daily PRN HOLLI Lion      calcium carbonate  500 mg Oral BID PRN HOLLI Monge      cloZAPine  250 mg Oral HS Bora Rosario MD      hydrOXYzine HCL  50 mg Oral Q6H PRN Max 4/day Jordan C Holter, DO      Or    diphenhydrAMINE  50 mg Intramuscular Q6H PRN Jordan C Holter, DO      hydrOXYzine HCL  50 mg Oral Q6H PRN Max 4/day HOLLI Lion      Or    diphenhydrAMINE  50 mg Intramuscular Q6H PRN HOLLI Lion      diphenhydrAMINE-zinc acetate   Topical BID PRN HOLLI Lion      docusate sodium  100 mg Oral BID Jourdan Dickens,       escitalopram  20 mg Oral Daily HOLLI Lion      famotidine  20 mg Oral BID PRN HOLLI Monge      fluticasone  1 spray Each Nare Daily PRN HOLLI Monge      haloperidol  1 mg Oral Q6H PRN HOLLI Lion      haloperidol  2.5 mg Oral Q4H PRN Max 4/day HOLLI Lion      haloperidol  5 mg Oral Q4H PRN Max 4/day HOLLI Lion      hydroCHLOROthiazide  12.5 mg Oral Daily Pia Mantilla, HOLLI      hydrocortisone   Topical 4x Daily PRN HOLLI Lion      hydrOXYzine HCL  100 mg Oral Q6H PRN Max 4/day Jordan C Holter, DO      Or    LORazepam  2 mg Intramuscular Q6H PRN Jordan C Holter, DO      hydrOXYzine HCL  100 mg Oral Q6H PRN Max 4/day HOLLI Lion      Or    LORazepam  2 mg Intramuscular Q6H PRN HOLLI Lion       hydrOXYzine HCL  25 mg Oral Q6H PRN Max 4/day [facility] Holter, DO      ibuprofen  600 mg Oral Q8H PRN HOLLI Lion      influenza vaccine  0.5 mL Intramuscular Once Bora Rosario MD      loratadine  10 mg Oral Daily Brina Guillen MD      magnesium hydroxide  30 mL Oral Once Jourdan Dickens, DO      melatonin  3 mg Oral HS PRN Bora Rosario MD      melatonin  9 mg Oral HS Bora Rosario MD      methocarbamol  500 mg Oral Q6H PRN HOLLI Lion      multivitamin-minerals  1 tablet Oral Daily Eileen Gonzalezmiryamjaylen, HOLLI      nicotine polacrilex  2 mg Oral Q4H PRN Bora Rosario MD      OLANZapine  5 mg Oral Q4H PRN Max 3/day [facility] Holter, DO      Or    OLANZapine  2.5 mg Intramuscular Q4H PRN Max 3/day [facility] Holter, DO      OLANZapine  5 mg Oral Q3H PRN Max 3/day [facility] Holter, DO      Or    OLANZapine  5 mg Intramuscular Q3H PRN Max 3/day [facility] Holter, DO      OLANZapine  2.5 mg Oral Q4H PRN Max 6/day [facility] Holter, DO      ondansetron  4 mg Oral Q6H PRN HOLLI Lion      [facility]oprazole  [facility]      polyethylene glycol  17 g Oral Daily PRN HOLLI Ghotra      polyethylene glycol  17 g Oral Daily Jourdan Dickens, DO      propranolol  10 mg Oral Q12H KAYLIE HOLLI Lion      saliva substitute  5 spray Mouth/Throat 4x Daily PRN Mary Jo Reich PA-C      senna-docusate sodium  1 tablet Oral Daily PRN [facility] Holter, DO      senna-docusate sodium  2 tablet Oral HS Jourdan Dickens, DO      traZODone  50 mg Oral HS PRN HOLLI Lion      white petrolatum-mineral oil   Topical TID PRN HOLLI Lion      Xanomeline-Trospium Chloride  50 mg Oral BID Bora Rosario MD         Counseling / Coordination of Care: Total floor / unit time spent today 15 minutes. Greater than 50% of total time was spent with the patient and / or family counseling and / or somewhat receptive to supportive listening and teaching positive coping skills to deal with symptom  pascual.     Patient's Rights, confidentiality and exceptions to confidentiality, use of automated medical record, Behavioral Health Services staff access to medical record, and consent to treatment reviewed.    This note has been dictated and hence there may be problems with punctuation, spelling and formatting and if anyone has any concerns please address them to Dr. Rosario  This note is not shared with patient due to potential for making patient's condition worse by knowing the content of the note.    Written by Leidy WALLACE and supervised by Dr. Rosario

## 2025-01-21 NOTE — NURSING NOTE
Pt is accepting of medications without incidence, but refused miralax and blood pressure medications as per parameters. Meal compliant consuming 100% of lunch, but refused breakfast and slept. Pt is polite, pleasant and social with select peers. Brightens on approach and attends groups. Pt remains reporting the same AH and anxiety and depression, but denies all other s/s. No new concerns.

## 2025-01-21 NOTE — ASSESSMENT & PLAN NOTE
Reviewed on 01/21/25  Follows with medical  Continue famotidine 20 mg tablet BID per medical team   Continue pantoprazole EC 20 mg tablet every morning per medical team  This is a chronic condition, and is stable unless otherwise noted.

## 2025-01-21 NOTE — ASSESSMENT & PLAN NOTE
Reviewed on 01/21/25  Follows with medical team  Continue senna-docusate sodium dose per medical team.   Last BM 1/17/25

## 2025-01-21 NOTE — PROGRESS NOTES
01/21/25 0841   Team Meeting   Meeting Type Daily Rounds   Team Members Present   Team Members Present Physician;Nurse;;Other (Discipline and Name)   Physician Team Member Abraham   Nursing Team Member Thaddeus   Social Work Team Member Jose J ORTEGA   Other (Discipline and Name) Wang PCM   Patient/Family Present   Patient Present No   Patient's Family Present No     Groups Participation  8/9.   He's compliant wth medications. He's engaged with his treatment.

## 2025-01-21 NOTE — PLAN OF CARE
Problem: Alteration in Thoughts and Perception  Goal: Treatment Goal: Gain control of psychotic behaviors/thinking, reduce/eliminate presenting symptoms and demonstrate improved reality functioning upon discharge  Outcome: Progressing  Goal: Refrain from acting on delusional thinking/internal stimuli  Description: Interventions:  - Monitor patient closely, per order   - Utilize least restrictive measures   - Set reasonable limits, give positive feedback for acceptable   - Administer medications as ordered and monitor of potential side effects  Outcome: Progressing  Goal: Agree to be compliant with medication regime, as prescribed and report medication side effects  Description: Interventions:  - Offer appropriate PRN medication and supervise ingestion; conduct AIMS, as needed   Outcome: Progressing  Goal: Recognize dysfunctional thoughts, communicate reality-based thoughts at the time of discharge  Description: Interventions:  - Provide medication and psycho-education to assist patient in compliance and developing insight into his/her illness   Outcome: Progressing     Problem: Ineffective Coping  Goal: Participates in unit activities  Description: Interventions:  - Provide therapeutic environment   - Provide required programming   - Redirect inappropriate behaviors   Outcome: Progressing  Goal: Patient/Family participate in treatment and DC plans  Description: Interventions:  - Provide therapeutic environment  Outcome: Progressing  Goal: Patient/Family verbalizes awareness of resources  Outcome: Progressing     Problem: Depression  Goal: Treatment Goal: Demonstrate behavioral control of depressive symptoms, verbalize feelings of improved mood/affect, and adopt new coping skills prior to discharge  Outcome: Progressing  Goal: Verbalize thoughts and feelings  Description: Interventions:  - Assess and re-assess patient's level of risk   - Engage patient in 1:1 interactions, daily, for a minimum of 15 minutes   -  Encourage patient to express feelings, fears, frustrations, hopes   Outcome: Progressing  Goal: Refrain from harming self  Description: Interventions:  - Monitor patient closely, per order   - Supervise medication ingestion, monitor effects and side effects   Outcome: Progressing  Goal: Refrain from isolation  Description: Interventions:  - Develop a trusting relationship   - Encourage socialization   Outcome: Progressing  Goal: Refrain from self-neglect  Outcome: Progressing     Problem: Anxiety  Goal: Anxiety is at manageable level  Description: Interventions:  - Assess and monitor patient's anxiety level.   - Monitor for signs and symptoms (heart palpitations, chest pain, shortness of breath, headaches, nausea, feeling jumpy, restlessness, irritable, apprehensive).   - Collaborate with interdisciplinary team and initiate plan and interventions as ordered.  - [place] patient to unit/surroundings  - Explain treatment plan  - Encourage participation in care  - Encourage verbalization of concerns/fears  - Identify coping mechanisms  - Assist in developing anxiety-reducing skills  - Administer/offer alternative therapies  - Limit or eliminate stimulants  Outcome: Progressing     Problem: Alteration in Orientation  Goal: Interact with staff daily  Description: Interventions:  - Assess and re-assess patient's level of orientation  - Engage patient in 1 on 1 interactions, daily, for a minimum of 15 minutes   - Establish rapport/trust with patient   Outcome: Progressing  Goal: Allow medical examinations, as recommended  Description: Interventions:  - Provide physical/neurological exams and/or referrals, per provider   Outcome: Progressing  Goal: Cooperate with recommended testing/procedures  Description: Interventions:  - Determine need for ancillary testing  - Observe for mental status changes  - Implement falls/precaution protocol   Outcome: Progressing  Goal: Complete daily ADLs, including personal hygiene  independently, as able  Description: Interventions:  - Observe, teach, and assist patient with ADLS  Outcome: Progressing     Problem: DISCHARGE PLANNING  Goal: Discharge to home with appropriate resources  Description: INTERVENTIONS:  - Identify barriers to discharge w/patient and caregiver  - Arrange for needed discharge resources and transportation as appropriate  - Identify discharge learning needs (meds, wound care, etc.)  - Refer to Case Management Department for coordinating discharge planning if the patient needs post-hospital services based on physician/advanced practitioner order or complex needs related to functional status, cognitive ability, or social support system  Outcome: Progressing

## 2025-01-21 NOTE — NURSING NOTE
Pt is visible on the unit and social with select peers. Consumed 100% of dinner. Took medications without incidence. Pt is pleasant and cooperative. Attended 9/11 groups. Reports anxiety, depression, and threatening AH. Denies SI/HI/VH. No behavioral issues. Pt offers no complaints.

## 2025-01-21 NOTE — ASSESSMENT & PLAN NOTE
Reviewed on 01/21/25  No significant changes still with same threatening voices as before not commanding and planning to have a virtual visit with his sisters  Continue medication as follows:  Continue Clozapine 250 mg QHS for psychosis - increased 12/11/24 01/21: ANC 3.95,   To consider further careful titration  CBC w/diff and CK every 2 weeks  CK was increased to 960 on 12/12 and oral hydration encouraged, CK improved to 905 on 12/13/24. Repeat on 12/17/24 decreased to 674 and on 12/20/24 decreased to 448  Labs  drawn on 12/24/2024, repeat  trending down  Labs drawn 01/15, ANC 4.01  Labs drawn 01/08,   Labs drawn 01/15   Continue Cobenfy 50/20 mg BID for psychosis  Hold off on increasing dose due to patient complaints of side effects. Continue to monitor for toleration. Currently reporting intermittent nausea and blurry vision when reading and xerostomia.   Abilify D/ferny  Continue Lexapro 20 mg daily for depression and anxiety  Robinul D/ferny on 01/13  Atropine D/ferny on 01/13  Continue Propanolol 10 mg BID for anxiety   Continue Melatonin 9 mg QHS for sleep  Continue Senna-Docusate sodium one 50 mg tablet QHS for constipation  Continue Zofran 4 mg tablet Q6H PRN for nausea   Start PRN mouth kote for xerostomia 1/18/25    Pt remains on dual antipsychotic Tx due to failure on monotherapy   .  S/p ECT treatments.  - ACT team referral was made for him living back with his aunt once MA funding comes through from the [facility] and sister will try to reapply

## 2025-01-21 NOTE — ASSESSMENT & PLAN NOTE
Reviewed on 01/21/25  on nicotine gum as needed and encouraged smoking cessation  Continue to encourage cessation upon discharge

## 2025-01-21 NOTE — PLAN OF CARE
Problem: Ineffective Coping  Goal: Identifies ineffective coping skills  Outcome: Progressing  Goal: Identifies healthy coping skills  Outcome: Progressing  Goal: Demonstrates healthy coping skills  Outcome: Progressing  Goal: Participates in unit activities  Description: Interventions:  - Provide therapeutic environment   - Provide required programming   - Redirect inappropriate behaviors   Outcome: Progressing   Continues to make progress towards goal by attending and participating in groups.

## 2025-01-21 NOTE — ASSESSMENT & PLAN NOTE
Reviewed on 01/21/25  Follows with medical team and given dietary counseling and need for exercise

## 2025-01-22 PROCEDURE — 99232 SBSQ HOSP IP/OBS MODERATE 35: CPT | Performed by: PSYCHIATRY & NEUROLOGY

## 2025-01-22 RX ADMIN — CLOZAPINE 250 MG: 25 TABLET ORAL at 21:06

## 2025-01-22 RX ADMIN — DOCUSATE SODIUM 100 MG: 100 CAPSULE, LIQUID FILLED ORAL at 17:09

## 2025-01-22 RX ADMIN — XANOMELINE AND TROSPIUM CHLORIDE 1 CAPSULE: 20; 50 CAPSULE, COATED PELLETS ORAL at 08:36

## 2025-01-22 RX ADMIN — NICOTINE POLACRILEX 2 MG: 2 GUM, CHEWING ORAL at 08:36

## 2025-01-22 RX ADMIN — PANTOPRAZOLE SODIUM 20 MG: 20 TABLET, DELAYED RELEASE ORAL at 08:36

## 2025-01-22 RX ADMIN — HYDROCHLOROTHIAZIDE 12.5 MG: 12.5 TABLET ORAL at 08:36

## 2025-01-22 RX ADMIN — DOCUSATE SODIUM 100 MG: 100 CAPSULE, LIQUID FILLED ORAL at 08:36

## 2025-01-22 RX ADMIN — MELATONIN TAB 3 MG 9 MG: 3 TAB at 21:05

## 2025-01-22 RX ADMIN — PROPRANOLOL HYDROCHLORIDE 10 MG: 10 TABLET ORAL at 21:06

## 2025-01-22 RX ADMIN — PROPRANOLOL HYDROCHLORIDE 10 MG: 10 TABLET ORAL at 08:36

## 2025-01-22 RX ADMIN — NICOTINE POLACRILEX 2 MG: 2 GUM, CHEWING ORAL at 12:36

## 2025-01-22 RX ADMIN — SENNOSIDES AND DOCUSATE SODIUM 2 TABLET: 50; 8.6 TABLET ORAL at 21:05

## 2025-01-22 RX ADMIN — MULTIPLE VITAMINS W/ MINERALS TAB 1 TABLET: TAB ORAL at 08:36

## 2025-01-22 RX ADMIN — NICOTINE POLACRILEX 2 MG: 2 GUM, CHEWING ORAL at 21:06

## 2025-01-22 RX ADMIN — XANOMELINE AND TROSPIUM CHLORIDE 100 CAPSULE: 20; 100 CAPSULE, COATED PELLETS ORAL at 17:09

## 2025-01-22 RX ADMIN — LORATADINE 10 MG: 10 TABLET ORAL at 08:36

## 2025-01-22 RX ADMIN — ESCITALOPRAM OXALATE 20 MG: 10 TABLET, FILM COATED ORAL at 08:36

## 2025-01-22 RX ADMIN — AMLODIPINE BESYLATE 5 MG: 5 TABLET ORAL at 08:36

## 2025-01-22 NOTE — NURSING NOTE
"Pt is accepting of medications without incidence and meal compliant. Pt reports AH, depression and anxiety as \"the same\". Pt is otherwise visible, polite, pleasant and bright in conversation. Smiling, laughing and joking with staff. No new concerns at this time.  " Attendinyo female presents with itching after eating certain foods.   notes it when she eats watermelon and certain other foods.  gets itching on the skin.  no shortness of breath.  no chest tightness.  no nausea or vomiting.  no fever.  does not take daily meds.  sometimes takes benadryl when it happens which helps.  also complaining of cramps in the right leg occasionally.  no weakness in the legs.

## 2025-01-22 NOTE — SOCIAL WORK
This writer accompanied patient to [employer] and obtained his renewed license. This writer contacted sister and requested an update on awards letter for patient.

## 2025-01-22 NOTE — ASSESSMENT & PLAN NOTE
Reviewed on 01/22/25  No significant changes still with same threatening voices as before not commanding and planning to have a virtual visit with his sisters  Continue medication as follows:  Continue Clozapine 250 mg QHS for psychosis - increased 12/11/24 01/21: ANC 3.95,   To consider further careful titration  CBC w/diff and CK every 2 weeks  CK was increased to 960 on 12/12 and oral hydration encouraged, CK improved to 905 on 12/13/24. Repeat on 12/17/24 decreased to 674 and on 12/20/24 decreased to 448  Labs  drawn on 12/24/2024, repeat  trending down  Labs drawn 01/15, ANC 4.01  Labs drawn 01/08,   Labs drawn 01/15   01/22 Increase Cobenfy to 100/20 mg BID for psychosis  Continue to monitor for toleration. Currently reporting intermittent nausea and blurry vision when reading and xerostomia.   Abilify D/efrny  Continue Lexapro 20 mg daily for depression and anxiety  Robinul D/ferny on 01/13  Atropine D/ferny on 01/13  Continue Propanolol 10 mg BID for anxiety   Continue Melatonin 9 mg QHS for sleep  Continue Senna-Docusate sodium one 50 mg tablet QHS for constipation  Continue Zofran 4 mg tablet Q6H PRN for nausea   Start PRN mouth kote for xerostomia 1/18/25    Pt remains on dual antipsychotic Tx due to failure on monotherapy   .  S/p ECT treatments.  - ACT team referral was made for him living back with his aunt once MA funding comes through from the [facility] and sister will try to reapply

## 2025-01-22 NOTE — PROGRESS NOTES
01/22/25 0819   Team Meeting   Meeting Type Daily Rounds   Team Members Present   Team Members Present Physician;Nurse;;Other (Discipline and Name)   Physician Team Member Thomas, Holter   Nursing Team Member Claudia   Social Work Team Member Jose J ORTEGA   Other (Discipline and Name) Wang PCM   Patient/Family Present   Patient Present No   Patient's Family Present No     Groups Participation  /11.   Patient's compliant with medications. He's engaged in his treatment. DMV appointment today. D/C home pending ACT intake.

## 2025-01-22 NOTE — PROGRESS NOTES
Progress Note - Behavioral Health   Name: Alberto Berumen 29 y.o. male I MRN: [id number]  Unit/Bed#: [facility] 112-02 I Date of Admission: 3/29/2024   Date of Service: 1/22/2025 I Hospital Day: 299     Assessment & Plan  Schizoaffective disorder, bipolar type (HCC)  Reviewed on 01/22/25  No significant changes still with same threatening voices as before not commanding and planning to have a virtual visit with his sisters  Continue medication as follows:  Continue Clozapine 250 mg QHS for psychosis - increased 12/11/24 01/21: ANC 3.95,   To consider further careful titration  CBC w/diff and CK every 2 weeks  CK was increased to 960 on 12/12 and oral hydration encouraged, CK improved to 905 on 12/13/24. Repeat on 12/17/24 decreased to 674 and on 12/20/24 decreased to 448  Labs  drawn on 12/24/2024, repeat  trending down  Labs drawn 01/15, ANC 4.01  Labs drawn 01/08,   Labs drawn 01/15   01/22 Increase Cobenfy to 100/20 mg BID for psychosis  Continue to monitor for toleration. Currently reporting intermittent nausea and blurry vision when reading and xerostomia.   Abilify D/ferny  Continue Lexapro 20 mg daily for depression and anxiety  Robinul D/ferny on 01/13  Atropine D/ferny on 01/13  Continue Propanolol 10 mg BID for anxiety   Continue Melatonin 9 mg QHS for sleep  Continue Senna-Docusate sodium one 50 mg tablet QHS for constipation  Continue Zofran 4 mg tablet Q6H PRN for nausea   Start PRN mouth kote for xerostomia 1/18/25    Pt remains on dual antipsychotic Tx due to failure on monotherapy   .  S/p ECT treatments.  - ACT team referral was made for him living back with his aunt once MA funding comes through from the [facility] and sister will try to reapply    Chronic idiopathic constipation  Reviewed on 01/22/25  Follows with medical team  Continue senna-docusate sodium dose per medical team.   Last BM 1/17/25  GERD (gastroesophageal reflux disease)  Reviewed on 01/22/25  Follows with  medical  Continue famotidine 20 mg tablet BID per medical team   Continue pantoprazole EC 20 mg tablet every morning per medical team  This is a chronic condition, and is stable unless otherwise noted.    Tobacco abuse  Reviewed on 01/22/25  on nicotine gum as needed and encouraged smoking cessation  Continue to encourage cessation upon discharge    Elevated hemoglobin A1c  Reviewed on 01/22/25  Follows with medical team  Class 2 obesity in adult  Reviewed on 01/22/25  Follows with medical team and given dietary counseling and need for exercise    Primary hypertension  Reviewed on 01/22/25  Follows with medical team on hydrochlorothiazide and Norvasc      Psychiatry Progress Note [facility]    Progress towards goals: moderate progress    Review of systems: denied    Psychiatric Diagnosis: Schizoaffective disorder, bipolar type    Assessment  Overall Status:  progressing, no significant changes   Certification Statement: The patient will continue to require additional inpatient hospital stay due to psychotic related symptoms and limited response to medications and ECT.         Medications: as above  Side effects from treatment: dry mouth and nausea   Medication changes: as above  Medication education: Risks side effects benefits and precautions of medications discussed with patient and they did verbalize an understanding about risks for metabolic syndrome from being on neuroleptics and tardive dyskinesia etc.  All medications reviewed and I recommend that they be continued for symptom management  Understanding of medications: Risks side effects benefits and precautions of medications discussed with patient and they did verbalize an understanding about risks for metabolic syndrome from being on neuroleptics and tardive dyskinesia etc., and patient appeared to have understanding.  Justification for dual anti-psychotics: due to not responding to single antipsychotic.    Non-pharmacological  treatments  Continue with individual, group, milieu and occupational therapy using recovery principles and psycho-education about accepting illness and the need for treatment.  Behavioral health checks every 7 minutes    Safety  Safety and communication plan established to target dynamic risk factors discussed above.    Discharge Plan   To his Aunt with [facility] for ACT when received.     Interval Progress:   Per treatment team, Alberto is social and visible on the unit. He is seen often exercising in the gym. He continues to endorse auditory hallucinations to staff that say they are going to kill him. His last bowel movement was reported to be on the 21st. He reported to staff yesterday that he continues to have blurry vision when reading, nausea and dry mouth. He attended 9/[employer] groups yesterday. He has a DMV appointment later today to get his license.     Today, Alberto reports his mood as depressed. He said he feels depressed because of the voices. He relays that the voices continue to say that they are going to kill him when he leaves here. He denies command hallucinations or the hallucinations involving his family. He said his goal for the day is to get his hair finished and work out. He denies SI/HI or passive wishes of death. He said his appetite and sleep are stable. He told this writer that he is still experiencing side effects but that they have improved. He noted the blurred vision when reading, nausea, and dry mouth.     Acceptance by patient: accepts inpatient treatment and medications  Hopefulness in recovery: to live with his aunt in the community  Involved in reintegration process: communicates with siblings and aunt   Trusting in relationship with psychiatrist: trusting  Sleep: good  Appetite: good  Compliance with Medications: compliant  Group attendance: 9/11 groups  Significant events: none in the last 24 hours     Mental Status Exam  Appearance:  age appropriate, casually dressed in  "hospital pants and a t-shirt, laying in bed asleep with a blanket pulled over his face, adequate hygiene  Behavior:  calm, cooperative, no eye contact, polite, difficulty awaking from sleep, kept blanket over his face during conversation   Speech: decreased rate, decreased volume, monotone, delay in responses   Mood: \"depressed\"  Affect: constricted, mood-congruent  Thought Process: organized, logical, coherent, goal directed, linear  Thought Content: some paranoia, intrusive thoughts, no overt delusions   Perceptual Disturbances:  auditory hallucinations of voices that say they are going to kill him when he leaves here. Denies command hallucinations or the hallucinations involving his family. Denies visual hallucinations. Not observed responding to internal stimuli or appearing preoccupied.   Risk Potential: Suicidal Ideations none, Homicidal Ideations none, and Potential for Aggression No  Sensorium: alert and oriented to person, place, time and situation  Cognition: recent and remote memory grossly intact  Consciousness: alert and awake  Attention: attention span and concentration are age appropriate  Intellect: appears to be of average intelligence  Insight: intact  Judgement: intact  Motor Activity: no abnormal movements     Vitals  Temp:  [97.6 °F (36.4 °C)-97.7 °F (36.5 °C)] 97.6 °F (36.4 °C)  HR:  [] 73  Resp:  [18] 18  BP: (123-133)/(70-76) 123/72  SpO2:  [99 %-100 %] 100 %  No intake or output data in the 24 hours ending 01/22/25 0917    Lab Results: All Labs For Current Hospital Admission Reviewed  Results from last 7 days   Lab Units 01/21/25  0536   WBC Thousand/uL 8.93   RBC Million/uL 5.53   HEMOGLOBIN g/dL 12.8   HEMATOCRIT % 42.7   MCV fL 77*   PLATELETS Thousands/uL 244   TOTAL NEUT ABS Thousands/µL 3.95       Current Facility-Administered Medications   Medication Dose Route Frequency Provider Last Rate    acetaminophen  650 mg Oral Q4H PRN Jordan C Holter, DO      acetaminophen  650 mg Oral " Q6H PRN HOLLI Lion      aluminum-magnesium hydroxide-simethicone  30 mL Oral Q4H PRN Jordan C Holter, DO      amLODIPine  5 mg Oral Daily HOLLI Lion      Artificial Tears  1 drop Both Eyes Q3H PRN Jordan C Holter, DO      haloperidol lactate  2.5 mg Intramuscular Q4H PRN Max 4/day HOLLI Lion      And    LORazepam  1 mg Intramuscular Q4H PRN Max 4/day HOLLI Lion      And    benztropine  0.5 mg Intramuscular Q4H PRN Max 4/day HOLLI Lion      haloperidol lactate  5 mg Intramuscular Q4H PRN Max 4/day HOLLI Lion      And    LORazepam  2 mg Intramuscular Q4H PRN Max 4/day HOLLI Lion      And    benztropine  1 mg Intramuscular Q4H PRN Max 4/day HOLLI Lion      bisacodyl  10 mg Rectal Daily PRN HOLLI Lion      calcium carbonate  500 mg Oral BID PRN HOLLI Monge      cloZAPine  250 mg Oral HS Bora Rosario MD      hydrOXYzine HCL  50 mg Oral Q6H PRN Max 4/day Jordan C Holter, DO      Or    diphenhydrAMINE  50 mg Intramuscular Q6H PRN Jordan C Holter, DO      hydrOXYzine HCL  50 mg Oral Q6H PRN Max 4/day HOLLI Lion      Or    diphenhydrAMINE  50 mg Intramuscular Q6H PRN HOLLI Lion      diphenhydrAMINE-zinc acetate   Topical BID PRN HOLLI Lion      docusate sodium  100 mg Oral BID Jourdan Dickens DO      escitalopram  20 mg Oral Daily HOLLI Lion      famotidine  20 mg Oral BID PRN HOLLI Monge      fluticasone  1 spray Each Nare Daily PRN HOLLI Monge      haloperidol  1 mg Oral Q6H PRN HOLLI Lion      haloperidol  2.5 mg Oral Q4H PRN Max 4/day HOLLI Lion      haloperidol  5 mg Oral Q4H PRN Max 4/day HOLLI Lion      hydroCHLOROthiazide  12.5 mg Oral Daily HOLLI Galvan      hydrocortisone   Topical 4x Daily PRN HOLLI Loin      hydrOXYzine HCL  100 mg Oral Q6H PRN Max 4/day Ion FISCHER Holter,       Or    LORazepam  2 mg  Intramuscular Q6H PRN [facility] Holter, DO      hydrOXYzine HCL  100 mg Oral Q6H PRN Max 4/day HOLLI Lion      Or    LORazepam  2 mg Intramuscular Q6H PRN HOLLI Lion      hydrOXYzine HCL  25 mg Oral Q6H PRN Max 4/day [facility] Holter, DO      ibuprofen  600 mg Oral Q8H PRN HOLLI Lion      influenza vaccine  0.5 mL Intramuscular Once Bora Rosario MD      loratadine  10 mg Oral Daily Brina Guillen MD      magnesium hydroxide  30 mL Oral Once Jourdan Dickens, DO      melatonin  3 mg Oral HS PRN Bora Rosario MD      melatonin  9 mg Oral HS Bora Rosario MD      methocarbamol  500 mg Oral Q6H PRN HOLLI Lion      multivitamin-minerals  1 tablet Oral Daily Eileen CherryHOLLI Jimenez      nicotine polacrilex  2 mg Oral Q4H PRN Bora Rosario MD      OLANZapine  5 mg Oral Q4H PRN Max 3/day [facility] Holter, DO      Or    OLANZapine  2.5 mg Intramuscular Q4H PRN Max 3/day [facility] Holter, DO      OLANZapine  5 mg Oral Q3H PRN Max 3/day [facility] Holter, DO      Or    OLANZapine  5 mg Intramuscular Q3H PRN Max 3/day [facility] Holter, DO      OLANZapine  2.5 mg Oral Q4H PRN Max 6/day [facility] Holter, DO      ondansetron  4 mg Oral Q6H PRN HOLLI Lion      pantoprazole  20 [facility]      polyethylene glycol  17 g Oral Daily PRN HOLLI Ghotra      polyethylene glycol  17 g Oral Daily Jourdan Dickens, DO      propranolol  10 mg Oral Q12H KAYLIE HOLLI Lion      saliva substitute  5 spray Mouth/Throat 4x Daily PRN Mary Jo Reich PA-C      senna-docusate sodium  1 tablet Oral Daily PRN [facility] Holter, DO      senna-docusate sodium  2 tablet Oral HS Jourdan Dickens, DO      traZODone  50 mg Oral HS PRN HOLLI Lion      white petrolatum-mineral oil   Topical TID PRN HOLLI Lion      Xanomeline-Trospium Chloride  50 mg Oral BID Bora Rosario MD         Counseling / Coordination of Care: Total floor / unit time spent today 15 minutes. Greater than  50% of total time was spent with the patient and / or family counseling and / or somewhat receptive to supportive listening and teaching positive coping skills to deal with symptom mangement.     Patient's Rights, confidentiality and exceptions to confidentiality, use of automated medical record, Behavioral Health Services staff access to medical record, and consent to treatment reviewed.    This note has been dictated and hence there may be problems with punctuation, spelling and formatting and if anyone has any concerns please address them to Dr. Rosario.   This note is not shared with patient due to potential for making patient's condition worse by knowing the content of the note.    Written by Leidy WALLACE and supervised by Dr. Rosario

## 2025-01-22 NOTE — ASSESSMENT & PLAN NOTE
Reviewed on 01/22/25  on nicotine gum as needed and encouraged smoking cessation  Continue to encourage cessation upon discharge

## 2025-01-22 NOTE — ASSESSMENT & PLAN NOTE
Reviewed on 01/22/25  Follows with medical  Continue famotidine 20 mg tablet BID per medical team   Continue pantoprazole EC 20 mg tablet every morning per medical team  This is a chronic condition, and is stable unless otherwise noted.

## 2025-01-22 NOTE — ASSESSMENT & PLAN NOTE
Reviewed on 01/22/25  Follows with medical team and given dietary counseling and need for exercise

## 2025-01-22 NOTE — ASSESSMENT & PLAN NOTE
Reviewed on 01/22/25  Follows with medical team  Continue senna-docusate sodium dose per medical team.   Last BM 1/17/25

## 2025-01-22 NOTE — PLAN OF CARE
Problem: Alteration in Thoughts and Perception  Goal: Treatment Goal: Gain control of psychotic behaviors/thinking, reduce/eliminate presenting symptoms and demonstrate improved reality functioning upon discharge  Outcome: Progressing  Goal: Refrain from acting on delusional thinking/internal stimuli  Description: Interventions:  - Monitor patient closely, per order   - Utilize least restrictive measures   - Set reasonable limits, give positive feedback for acceptable   - Administer medications as ordered and monitor of potential side effects  Outcome: Progressing  Goal: Agree to be compliant with medication regime, as prescribed and report medication side effects  Description: Interventions:  - Offer appropriate PRN medication and supervise ingestion; conduct AIMS, as needed   Outcome: Progressing  Goal: Recognize dysfunctional thoughts, communicate reality-based thoughts at the time of discharge  Description: Interventions:  - Provide medication and psycho-education to assist patient in compliance and developing insight into his/her illness   Outcome: Progressing     Problem: Ineffective Coping  Goal: Participates in unit activities  Description: Interventions:  - Provide therapeutic environment   - Provide required programming   - Redirect inappropriate behaviors   Outcome: Progressing  Goal: Patient/Family participate in treatment and DC plans  Description: Interventions:  - Provide therapeutic environment  Outcome: Progressing  Goal: Patient/Family verbalizes awareness of resources  Outcome: Progressing     Problem: Depression  Goal: Treatment Goal: Demonstrate behavioral control of depressive symptoms, verbalize feelings of improved mood/affect, and adopt new coping skills prior to discharge  Outcome: Progressing  Goal: Verbalize thoughts and feelings  Description: Interventions:  - Assess and re-assess patient's level of risk   - Engage patient in 1:1 interactions, daily, for a minimum of 15 minutes   -  Encourage patient to express feelings, fears, frustrations, hopes   Outcome: Progressing  Goal: Refrain from harming self  Description: Interventions:  - Monitor patient closely, per order   - Supervise medication ingestion, monitor effects and side effects   Outcome: Progressing  Goal: Refrain from isolation  Description: Interventions:  - Develop a trusting relationship   - Encourage socialization   Outcome: Progressing  Goal: Refrain from self-neglect  Outcome: Progressing     Problem: Anxiety  Goal: Anxiety is at manageable level  Description: Interventions:  - Assess and monitor patient's anxiety level.   - Monitor for signs and symptoms (heart palpitations, chest pain, shortness of breath, headaches, nausea, feeling jumpy, restlessness, irritable, apprehensive).   - Collaborate with interdisciplinary team and initiate plan and interventions as ordered.  - [place] patient to unit/surroundings  - Explain treatment plan  - Encourage participation in care  - Encourage verbalization of concerns/fears  - Identify coping mechanisms  - Assist in developing anxiety-reducing skills  - Administer/offer alternative therapies  - Limit or eliminate stimulants  Outcome: Progressing     Problem: Alteration in Orientation  Goal: Treatment Goal: Demonstrate a reduction of confusion and improved orientation to person, place, time and/or situation upon discharge, according to optimum baseline/ability  Outcome: Progressing  Goal: Interact with staff daily  Description: Interventions:  - Assess and re-assess patient's level of orientation  - Engage patient in 1 on 1 interactions, daily, for a minimum of 15 minutes   - Establish rapport/trust with patient   Outcome: Progressing  Goal: Express concerns related to confused thinking related to:  Description: Interventions:  - Encourage patient to express feelings, fears, frustrations, hopes  - Assign consistent caregivers   - [place]/re-orient patient as needed  - Allow comfort items, as  appropriate  - Provide visual cues, signs, etc.   Outcome: Progressing  Goal: Allow medical examinations, as recommended  Description: Interventions:  - Provide physical/neurological exams and/or referrals, per provider   Outcome: Progressing  Goal: Cooperate with recommended testing/procedures  Description: Interventions:  - Determine need for ancillary testing  - Observe for mental status changes  - Implement falls/precaution protocol   Outcome: Progressing  Goal: Complete daily ADLs, including personal hygiene independently, as able  Description: Interventions:  - Observe, teach, and assist patient with ADLS  Outcome: Progressing     Problem: DISCHARGE PLANNING  Goal: Discharge to home with appropriate resources  Description: INTERVENTIONS:  - Identify barriers to discharge w/patient and caregiver  - Arrange for needed discharge resources and transportation as appropriate  - Identify discharge learning needs (meds, wound care, etc.)  - Refer to Case Management Department for coordinating discharge planning if the patient needs post-hospital services based on physician/advanced practitioner order or complex needs related to functional status, cognitive ability, or social support system  Outcome: Progressing

## 2025-01-23 PROCEDURE — 99232 SBSQ HOSP IP/OBS MODERATE 35: CPT | Performed by: PSYCHIATRY & NEUROLOGY

## 2025-01-23 RX ADMIN — MELATONIN TAB 3 MG 9 MG: 3 TAB at 21:16

## 2025-01-23 RX ADMIN — ESCITALOPRAM OXALATE 20 MG: 10 TABLET, FILM COATED ORAL at 08:39

## 2025-01-23 RX ADMIN — DOCUSATE SODIUM 100 MG: 100 CAPSULE, LIQUID FILLED ORAL at 08:39

## 2025-01-23 RX ADMIN — NICOTINE POLACRILEX 2 MG: 2 GUM, CHEWING ORAL at 21:15

## 2025-01-23 RX ADMIN — MULTIPLE VITAMINS W/ MINERALS TAB 1 TABLET: TAB ORAL at 08:39

## 2025-01-23 RX ADMIN — PROPRANOLOL HYDROCHLORIDE 10 MG: 10 TABLET ORAL at 21:16

## 2025-01-23 RX ADMIN — XANOMELINE AND TROSPIUM CHLORIDE 1 CAPSULE: 20; 100 CAPSULE, COATED PELLETS ORAL at 08:41

## 2025-01-23 RX ADMIN — HYDROCHLOROTHIAZIDE 12.5 MG: 12.5 TABLET ORAL at 08:39

## 2025-01-23 RX ADMIN — CLOZAPINE 250 MG: 25 TABLET ORAL at 21:15

## 2025-01-23 RX ADMIN — NICOTINE POLACRILEX 2 MG: 2 GUM, CHEWING ORAL at 12:48

## 2025-01-23 RX ADMIN — PANTOPRAZOLE SODIUM 20 MG: 20 TABLET, DELAYED RELEASE ORAL at 08:39

## 2025-01-23 RX ADMIN — DOCUSATE SODIUM 100 MG: 100 CAPSULE, LIQUID FILLED ORAL at 17:13

## 2025-01-23 RX ADMIN — NICOTINE POLACRILEX 2 MG: 2 GUM, CHEWING ORAL at 08:39

## 2025-01-23 RX ADMIN — NICOTINE POLACRILEX 2 MG: 2 GUM, CHEWING ORAL at 17:13

## 2025-01-23 RX ADMIN — PROPRANOLOL HYDROCHLORIDE 10 MG: 10 TABLET ORAL at 08:39

## 2025-01-23 RX ADMIN — SENNOSIDES AND DOCUSATE SODIUM 2 TABLET: 50; 8.6 TABLET ORAL at 21:15

## 2025-01-23 RX ADMIN — LORATADINE 10 MG: 10 TABLET ORAL at 08:39

## 2025-01-23 RX ADMIN — XANOMELINE AND TROSPIUM CHLORIDE 100 MG: 20; 100 CAPSULE, COATED PELLETS ORAL at 17:13

## 2025-01-23 NOTE — ASSESSMENT & PLAN NOTE
Reviewed on 01/23/25  Follows with medical team and given dietary counseling and need for exercise

## 2025-01-23 NOTE — PLAN OF CARE
Problem: Alteration in Thoughts and Perception  Goal: Refrain from acting on delusional thinking/internal stimuli  Description: Interventions:  - Monitor patient closely, per order   - Utilize least restrictive measures   - Set reasonable limits, give positive feedback for acceptable   - Administer medications as ordered and monitor of potential side effects  Outcome: Progressing  Goal: Agree to be compliant with medication regime, as prescribed and report medication side effects  Description: Interventions:  - Offer appropriate PRN medication and supervise ingestion; conduct AIMS, as needed   Outcome: Progressing     Problem: Ineffective Coping  Goal: Identifies ineffective coping skills  Outcome: Progressing  Goal: Identifies healthy coping skills  Outcome: Progressing  Goal: Demonstrates healthy coping skills  Outcome: Progressing  Goal: Participates in unit activities  Description: Interventions:  - Provide therapeutic environment   - Provide required programming   - Redirect inappropriate behaviors   Outcome: Progressing     Problem: Depression  Goal: Refrain from isolation  Description: Interventions:  - Develop a trusting relationship   - Encourage socialization   Outcome: Progressing  Goal: Refrain from self-neglect  Outcome: Progressing     Problem: Anxiety  Goal: Anxiety is at manageable level  Description: Interventions:  - Assess and monitor patient's anxiety level.   - Monitor for signs and symptoms (heart palpitations, chest pain, shortness of breath, headaches, nausea, feeling jumpy, restlessness, irritable, apprehensive).   - Collaborate with interdisciplinary team and initiate plan and interventions as ordered.  - [place] patient to unit/surroundings  - Explain treatment plan  - Encourage participation in care  - Encourage verbalization of concerns/fears  - Identify coping mechanisms  - Assist in developing anxiety-reducing skills  - Administer/offer alternative therapies  - Limit or eliminate  stimulants  Outcome: Progressing     Problem: Alteration in Orientation  Goal: Complete daily ADLs, including personal hygiene independently, as able  Description: Interventions:  - Observe, teach, and assist patient with ADLS  Outcome: Progressing

## 2025-01-23 NOTE — PROGRESS NOTES
Progress Note - Behavioral Health   Name: Alberto Berumen 29 y.o. male I MRN: [id number]  Unit/Bed#: [facility] 112-02 I Date of Admission: 3/29/2024   Date of Service: 1/23/2025 I Hospital Day: 300     Assessment & Plan  Schizoaffective disorder, bipolar type (HCC)  Reviewed on 01/23/25  No significant changes still with same threatening voices as before not commanding and planning to have a virtual visit with his sisters  Continue medication as follows:  Continue Clozapine 250 mg QHS for psychosis - increased 12/11/24 01/21: ANC 3.95,   To consider further careful titration  CBC w/diff and CK every 2 weeks  CK was increased to 960 on 12/12 and oral hydration encouraged, CK improved to 905 on 12/13/24. Repeat on 12/17/24 decreased to 674 and on 12/20/24 decreased to 448  Labs  drawn on 12/24/2024, repeat  trending down  Labs drawn 01/15, ANC 4.01  Labs drawn 01/08,   Labs drawn 01/15   01/22 Increase Cobenfy to 100/20 mg BID for psychosis  Continue to monitor for toleration. Currently reporting intermittent nausea and blurry vision when reading and xerostomia.   Abilify D/ferny  Continue Lexapro 20 mg daily for depression and anxiety  Robinul D/ferny on 01/13  Atropine D/ferny on 01/13  Continue Propanolol 10 mg BID for anxiety   Continue Melatonin 9 mg QHS for sleep  Continue Senna-Docusate sodium one 50 mg tablet QHS for constipation  Continue Zofran 4 mg tablet Q6H PRN for nausea   Start PRN mouth kote for xerostomia 1/18/25    Pt remains on dual antipsychotic Tx due to failure on monotherapy   .  S/p ECT treatments.  - ACT team referral was made for him living back with his aunt once MA funding comes through from the [facility] and sister will try to reapply    Chronic idiopathic constipation  Reviewed on 01/23/25  Follows with medical team  Continue senna-docusate sodium dose per medical team.   Last BM 1/17/25  GERD (gastroesophageal reflux disease)  Reviewed on 01/23/25  Follows with  medical  Continue famotidine 20 mg tablet BID per medical team   Continue pantoprazole EC 20 mg tablet every morning per medical team  This is a chronic condition, and is stable unless otherwise noted.    Tobacco abuse  Reviewed on 01/23/25  on nicotine gum as needed and encouraged smoking cessation  Continue to encourage cessation upon discharge    Elevated hemoglobin A1c  Reviewed on 01/23/25  Follows with medical team  Class 2 obesity in adult  Reviewed on 01/23/25  Follows with medical team and given dietary counseling and need for exercise    Primary hypertension  Reviewed on 01/23/25  Follows with medical team on hydrochlorothiazide and Norvasc      Psychiatry Progress Note [facility]    Progress towards goals: moderate progress    Review of systems: denied    Psychiatric Diagnosis: Schizoaffective disorder, bipolar type     Assessment  Overall Status:  progressing, no significant changes  Certification Statement: The patient will continue to require additional inpatient hospital stay due to psychotic related symptoms and limited response to medications and ECT        Medications: as above  Side effects from treatment: dry mouth, nausea, blurred vision   Medication changes: as above  Medication education: Risks side effects benefits and precautions of medications discussed with patient and they did verbalize an understanding about risks for metabolic syndrome from being on neuroleptics and tardive dyskinesia etc.  All medications reviewed and I recommend that they be continued for symptom management  Understanding of medications: Risks side effects benefits and precautions of medications discussed with patient and they did verbalize an understanding about risks for metabolic syndrome from being on neuroleptics and tardive dyskinesia etc., and patient appeared to have understanding.  Justification for dual anti-psychotics: due to not responding to single antipsychotic  "    Non-pharmacological treatments  Continue with individual, group, milieu and occupational therapy using recovery principles and psycho-education about accepting illness and the need for treatment.  Behavioral health checks every 7 minutes    Safety  Safety and communication plan established to target dynamic risk factors discussed above.    Discharge Plan   To his Aunt with [facility] for ACT when received     Interval Progress:   Per treatment team, Alberto is pleasant, cooperative, visible and social on the unit. Yesterday he had his cobenfy increased to 100/20 mg BID. He continues to endorse the same auditory hallucinations to staff. He went to 6/10 groups yesterday. Yesterday he had an outing to [facility] to get a new id and that went well.     Today, Alberto reports his mood as \"exhausted and depressed\". When asked why he said it is because of the voices. He said that there has been no change in his auditory hallucinations and that it is still the same voices that are saying they are going to kill him when he leaves here. He denies command hallucinations or the hallucinations involving his family. He denies SI/HI or passive wishes of death. He said his sleep and appetite are stable. He said his goal for the day was to work out. When prompted he said his appointment yesterday went well at [facility].    Acceptance by patient: accepts inpatient treatment and medications  Hopefulness in recovery: to live with his aunt in the community  Involved in reintegration process: communicates with siblings and aunt   Trusting in relationship with psychiatrist: trusting  Sleep: good  Appetite: good  Compliance with Medications: compliant  Group attendance: 6/10 groups   Significant events: none in the last 24 hours     Mental Status Exam  Appearance: age appropriate, casually dressed, wearing a sweatshirt, laying in bed asleep with a blanket pulled over his face, adequate hygiene  Behavior: calm, cooperative, no eye " "contact, polite, easily aroused from sleep, kept blanket over his face during conversation  Speech: clear, coherent, decreased rate, decreased volume, monotone  Mood: \"depressed and exhausted\"  Affect: constricted, mood-congruent  Thought Process: organized, logical, coherent, goal directed, linear  Thought Content: no overt delusions, mild paranoia, intrusive thoughts  Perceptual Disturbances:  auditory hallucinations of voices that say they are going to kill him when he leaves here. Denies command hallucinations or the hallucinations involving his family. Denies visual hallucinations. Not observed responding to internal stimuli or appearing preoccupied   Risk Potential: Suicidal Ideations none, Homicidal Ideations none, and Potential for Aggression No  Sensorium: alert and oriented to person, place, time and situation  Cognition: recent and remote memory grossly intact  Consciousness: alert and awake  Attention: attention span and concentration are age appropriate  Intellect: appears to be of average intelligence  Insight: intact  Judgement: intact  Motor Activity: no abnormal movements     Vitals  Temp:  [97.5 °F (36.4 °C)-97.7 °F (36.5 °C)] 97.5 °F (36.4 °C)  HR:  [75-88] 75  Resp:  [18] 18  BP: (119-140)/(60-90) 119/71  SpO2:  [96 %-99 %] 96 %  No intake or output data in the 24 hours ending 01/23/25 1036    Lab Results: All Labs For Current Hospital Admission Reviewed  Results from last 7 days   Lab Units 01/21/25  0536   WBC Thousand/uL 8.93   RBC Million/uL 5.53   HEMOGLOBIN g/dL 12.8   HEMATOCRIT % 42.7   MCV fL 77*   PLATELETS Thousands/uL 244   TOTAL NEUT ABS Thousands/µL 3.95       Current Facility-Administered Medications   Medication Dose Route Frequency Provider Last Rate    acetaminophen  650 mg Oral Q4H PRN Jordan C Holter, DO      acetaminophen  650 mg Oral Q6H PRN HOLLI Lion      aluminum-magnesium hydroxide-simethicone  30 mL Oral Q4H PRN Jordan C Holter, DO      amLODIPine  5 mg Oral " Daily HOLLI Lion      Artificial Tears  1 drop Both Eyes Q3H PRN Jordan C Holter, DO      haloperidol lactate  2.5 mg Intramuscular Q4H PRN Max 4/day HOLLI Lion      And    LORazepam  1 mg Intramuscular Q4H PRN Max 4/day HOLLI Lion      And    benztropine  0.5 mg Intramuscular Q4H PRN Max 4/day HOLLI Lion      haloperidol lactate  5 mg Intramuscular Q4H PRN Max 4/day HOLLI Lion      And    LORazepam  2 mg Intramuscular Q4H PRN Max 4/day HOLLI Lion      And    benztropine  1 mg Intramuscular Q4H PRN Max 4/day HOLLI Lion      bisacodyl  10 mg Rectal Daily PRN HOLLI Lion      calcium carbonate  500 mg Oral BID PRN HOLLI Monge      cloZAPine  250 mg Oral HS Bora Rosario MD      hydrOXYzine HCL  50 mg Oral Q6H PRN Max 4/day Jordan C Holter, DO      Or    diphenhydrAMINE  50 mg Intramuscular Q6H PRN Jordan C Holter, DO      hydrOXYzine HCL  50 mg Oral Q6H PRN Max 4/day HOLLI Lion      Or    diphenhydrAMINE  50 mg Intramuscular Q6H PRN HOLLI Lion      diphenhydrAMINE-zinc acetate   Topical BID PRN HOLLI Lion      docusate sodium  100 mg Oral BID Jourdan Dickens, DO      escitalopram  20 mg Oral Daily HOLLI Lion      famotidine  20 mg Oral BID PRN HOLLI Monge      fluticasone  1 spray Each Nare Daily PRN HOLLI Monge      haloperidol  1 mg Oral Q6H PRN HOLLI Lion      haloperidol  2.5 mg Oral Q4H PRN Max 4/day HOLLI Lion      haloperidol  5 mg Oral Q4H PRN Max 4/day HOLLI Lion      hydroCHLOROthiazide  12.5 mg Oral Daily HOLLI Galvan      hydrocortisone   Topical 4x Daily PRN HOLLI Lion      hydrOXYzine HCL  100 mg Oral Q6H PRN Max 4/day Jordan C Holter, DO      Or    LORazepam  2 mg Intramuscular Q6H PRN Jordan C Holter, DO      hydrOXYzine HCL  100 mg Oral Q6H PRN Max 4/day HOLLI Lion      Or    LORazepam  2 mg  Intramuscular Q6H PRN HOLLI Lion      hydrOXYzine HCL  25 mg Oral Q6H PRN Max 4/day [facility] Holter, DO      ibuprofen  600 mg Oral Q8H PRN HOLLI Lion      influenza vaccine  0.5 mL Intramuscular Once Bora Rosario MD      loratadine  10 mg Oral Daily Brina Guillen MD      magnesium hydroxide  30 mL Oral Once Jourdan Dickens, DO      melatonin  3 mg Oral HS PRN Bora Rosario MD      melatonin  9 mg Oral HS Bora Rosario MD      methocarbamol  500 mg Oral Q6H PRN HOLLI Lion      multivitamin-minerals  1 tablet Oral Daily Eileen Jensen, HOLLI      nicotine polacrilex  2 mg Oral Q4H PRN Bora Rosario MD      OLANZapine  5 mg Oral Q4H PRN Max 3/day [facility] Holter, DO      Or    OLANZapine  2.5 mg Intramuscular Q4H PRN Max 3/day [facility] Holter, DO      OLANZapine  5 mg Oral Q3H PRN Max 3/day [facility] Holter, DO      Or    OLANZapine  5 mg Intramuscular Q3H PRN Max 3/day [facility] Holter, DO      OLANZapine  2.5 mg Oral Q4H PRN Max 6/day [facility] Holter, DO      ondansetron  4 mg Oral Q6H PRN HOLLI Lion      pantoprazole  20 [facility] Hol[facility]      polyethylene glycol  17 g Oral Daily PRN HOLLI Ghotra      polyethylene glycol  17 g Oral Daily Jourdan Dickens, DO      propranolol  10 mg Oral Q12H KAYLIE HOLLI Lion      saliva substitute  5 spray Mouth/Throat 4x Daily PRN Mary Jo Reich PA-C      senna-docusate sodium  1 tablet Oral Daily PRN [facility] Holter, DO      senna-docusate sodium  2 tablet Oral HS Jourdan Dickens, DO      traZODone  50 mg Oral HS PRN HOLLI Lion      white petrolatum-mineral oil   Topical TID PRN HOLLI Lion      Xanomeline-Trospium Chloride  1 capsule Oral BID Bora Rosario MD         Counseling / Coordination of Care: Total floor / unit time spent today 15 minutes. Greater than 50% of total time was spent with the patient and / or family counseling and / or somewhat receptive to supportive listening and teaching  positive coping skills to deal with symptom mangement.     Patient's Rights, confidentiality and exceptions to confidentiality, use of automated medical record, Behavioral Health Services staff access to medical record, and consent to treatment reviewed.    This note has been dictated and hence there may be problems with punctuation, spelling and formatting and if anyone has any concerns please address them to Dr. Rosario  This note is not shared with patient due to potential for making patient's condition worse by knowing the content of the note.    Written by Leidy WALLACE and supervised by Dr. Rosario

## 2025-01-23 NOTE — NURSING NOTE
"Alberto was calm and cooperative during assessment. He reports AH of a voice stating it will \"kill him and his family.\" He denies current SI, SH, and VH. He reports mild anxiety and depression related to the AH but states it feels manageable without PRN medication at this time. He attended meals in the milieu and group programming. He was medication compliant and requested nicorette gum as a PRN. He denies new concerns at this time.   "

## 2025-01-23 NOTE — ASSESSMENT & PLAN NOTE
Reviewed on 01/23/25  on nicotine gum as needed and encouraged smoking cessation  Continue to encourage cessation upon discharge

## 2025-01-23 NOTE — PROGRESS NOTES
01/23/25 0822   Team Meeting   Meeting Type Daily Rounds   Team Members Present   Team Members Present Physician;Nurse;;Other (Discipline and Name)   Physician Team Member Thomas, Holter   Nursing Team Member Claudia   Social Work Team Member Jose J ORTEGA   Patient/Family Present   Patient Present No   Patient's Family Present No     Groups Participation  6/10.   He's compliant with medications. He's engaged in his treatment. He's pleasant and cooperative. He interacts with his peers and staff. Cobenfy increased to 100mg. Reports depression and AH.

## 2025-01-23 NOTE — NURSING NOTE
Pt in bed asleep, observed eyes closed, and chest movement noted. 15 minute safety checks maintained. No unmet needs at this time. Will continue to monitor patient needs, sleep pattern, and behaviors.     0622: Patient has slept all night, 7+ hours of uninterrupted sleep

## 2025-01-23 NOTE — PLAN OF CARE
Problem: Ineffective Coping  Goal: Identifies ineffective coping skills  Outcome: Progressing  Goal: Identifies healthy coping skills  Outcome: Progressing  Goal: Demonstrates healthy coping skills  Outcome: Progressing  Goal: Participates in unit activities  Description: Interventions:  - Provide therapeutic environment   - Provide required programming   - Redirect inappropriate behaviors   Outcome: Progressing   He continues to work towards the goals by engaging and actively participating in the groups.

## 2025-01-23 NOTE — NURSING NOTE
Pt is visible on the unit and social with select peers. Consumed 100% of dinner. Took medications without incidence. Pt is pleasant and cooperative, bright affect. Attended 6/10 groups. Reports anxiety, depression, and threatening AH. Denies SI/HI/VH. No behavioral issues. Pt offers no complaints.

## 2025-01-23 NOTE — ASSESSMENT & PLAN NOTE
Reviewed on 01/23/25  Follows with medical team  Continue senna-docusate sodium dose per medical team.   Last BM 1/17/25

## 2025-01-23 NOTE — ASSESSMENT & PLAN NOTE
Reviewed on 01/23/25  Follows with medical  Continue famotidine 20 mg tablet BID per medical team   Continue pantoprazole EC 20 mg tablet every morning per medical team  This is a chronic condition, and is stable unless otherwise noted.

## 2025-01-24 PROCEDURE — 99232 SBSQ HOSP IP/OBS MODERATE 35: CPT | Performed by: PSYCHIATRY & NEUROLOGY

## 2025-01-24 RX ADMIN — HYDROCHLOROTHIAZIDE 12.5 MG: 12.5 TABLET ORAL at 08:40

## 2025-01-24 RX ADMIN — PROPRANOLOL HYDROCHLORIDE 10 MG: 10 TABLET ORAL at 08:40

## 2025-01-24 RX ADMIN — ESCITALOPRAM OXALATE 20 MG: 10 TABLET, FILM COATED ORAL at 08:40

## 2025-01-24 RX ADMIN — NICOTINE POLACRILEX 2 MG: 2 GUM, CHEWING ORAL at 17:15

## 2025-01-24 RX ADMIN — DOCUSATE SODIUM 100 MG: 100 CAPSULE, LIQUID FILLED ORAL at 17:15

## 2025-01-24 RX ADMIN — MULTIPLE VITAMINS W/ MINERALS TAB 1 TABLET: TAB ORAL at 08:40

## 2025-01-24 RX ADMIN — NICOTINE POLACRILEX 2 MG: 2 GUM, CHEWING ORAL at 21:28

## 2025-01-24 RX ADMIN — LORATADINE 10 MG: 10 TABLET ORAL at 08:40

## 2025-01-24 RX ADMIN — MELATONIN TAB 3 MG 9 MG: 3 TAB at 21:16

## 2025-01-24 RX ADMIN — XANOMELINE AND TROSPIUM CHLORIDE 1 CAPSULE: 20; 100 CAPSULE, COATED PELLETS ORAL at 17:17

## 2025-01-24 RX ADMIN — NICOTINE POLACRILEX 2 MG: 2 GUM, CHEWING ORAL at 08:40

## 2025-01-24 RX ADMIN — XANOMELINE AND TROSPIUM CHLORIDE 1 CAPSULE: 20; 100 CAPSULE, COATED PELLETS ORAL at 08:40

## 2025-01-24 RX ADMIN — DOCUSATE SODIUM 100 MG: 100 CAPSULE, LIQUID FILLED ORAL at 08:40

## 2025-01-24 RX ADMIN — NICOTINE POLACRILEX 2 MG: 2 GUM, CHEWING ORAL at 12:34

## 2025-01-24 RX ADMIN — PANTOPRAZOLE SODIUM 20 MG: 20 TABLET, DELAYED RELEASE ORAL at 08:40

## 2025-01-24 RX ADMIN — CLOZAPINE 250 MG: 25 TABLET ORAL at 21:15

## 2025-01-24 RX ADMIN — AMLODIPINE BESYLATE 5 MG: 5 TABLET ORAL at 08:40

## 2025-01-24 RX ADMIN — PROPRANOLOL HYDROCHLORIDE 10 MG: 10 TABLET ORAL at 21:16

## 2025-01-24 RX ADMIN — SENNOSIDES AND DOCUSATE SODIUM 2 TABLET: 50; 8.6 TABLET ORAL at 21:16

## 2025-01-24 NOTE — PROGRESS NOTES
01/24/25 0904   Team Meeting   Meeting Type Daily Rounds   Team Members Present   Team Members Present Physician;Nurse;;Other (Discipline and Name)   Physician Team Member Abraham   Nursing Team Member Claudia   Social Work Team Member Jose J ORTEGA   Other (Discipline and Name) Wang PCM   Patient/Family Present   Patient Present No   Patient's Family Present No     Groups Participation  8/10.   Patient's compliant with medications. He's engaged in his treatment.

## 2025-01-24 NOTE — ASSESSMENT & PLAN NOTE
Reviewed on 01/24/25  No significant changes still with same threatening voices as before not commanding and planning to have a virtual visit with his sisters  Continue medication as follows:  Continue Clozapine 250 mg QHS for psychosis - increased 12/11/24 01/21: ANC 3.95,   To consider further careful titration  CBC w/diff and CK every 2 weeks  CK was increased to 960 on 12/12 and oral hydration encouraged, CK improved to 905 on 12/13/24. Repeat on 12/17/24 decreased to 674 and on 12/20/24 decreased to 448  Labs  drawn on 12/24/2024, repeat  trending down  Labs drawn 01/15, ANC 4.01  Labs drawn 01/08,   Labs drawn 01/15   01/22 Increase Cobenfy to 100/20 mg BID for psychosis  Continue to monitor for toleration. Currently reporting intermittent nausea and blurry vision when reading and xerostomia.   Abilify D/ferny  Continue Lexapro 20 mg daily for depression and anxiety  Robinul D/ferny on 01/13  Atropine D/ferny on 01/13  Continue Propanolol 10 mg BID for anxiety   Continue Melatonin 9 mg QHS for sleep  Continue Senna-Docusate sodium one 50 mg tablet QHS for constipation  Continue Zofran 4 mg tablet Q6H PRN for nausea   Start PRN mouth kote for xerostomia 1/18/25    Pt remains on dual antipsychotic Tx due to failure on monotherapy   .  S/p ECT treatments.  - ACT team referral was made for him living back with his aunt once MA funding comes through from the [facility] and sister will try to reapply      No associated orders from this encounter found during lookback period of 72 hours.

## 2025-01-24 NOTE — ASSESSMENT & PLAN NOTE
Reviewed on 01/24/25  Follows with medical team on hydrochlorothiazide and Norvasc      No associated orders from this encounter found during lookback period of 72 hours.

## 2025-01-24 NOTE — ASSESSMENT & PLAN NOTE
Reviewed on 01/24/25  on nicotine gum as needed and encouraged smoking cessation  Continue to encourage cessation upon discharge      No associated orders from this encounter found during lookback period of 72 hours.

## 2025-01-24 NOTE — PLAN OF CARE
Problem: Alteration in Thoughts and Perception  Goal: Agree to be compliant with medication regime, as prescribed and report medication side effects  Description: Interventions:  - Offer appropriate PRN medication and supervise ingestion; conduct AIMS, as needed   Outcome: Progressing  Goal: Recognize dysfunctional thoughts, communicate reality-based thoughts at the time of discharge  Description: Interventions:  - Provide medication and psycho-education to assist patient in compliance and developing insight into his/her illness   Outcome: Progressing     Problem: Ineffective Coping  Goal: Participates in unit activities  Description: Interventions:  - Provide therapeutic environment   - Provide required programming   - Redirect inappropriate behaviors   Outcome: Progressing  Goal: Patient/Family participate in treatment and DC plans  Description: Interventions:  - Provide therapeutic environment  Outcome: Progressing     Problem: Depression  Goal: Treatment Goal: Demonstrate behavioral control of depressive symptoms, verbalize feelings of improved mood/affect, and adopt new coping skills prior to discharge  Outcome: Progressing  Goal: Verbalize thoughts and feelings  Description: Interventions:  - Assess and re-assess patient's level of risk   - Engage patient in 1:1 interactions, daily, for a minimum of 15 minutes   - Encourage patient to express feelings, fears, frustrations, hopes   Outcome: Progressing  Goal: Refrain from harming self  Description: Interventions:  - Monitor patient closely, per order   - Supervise medication ingestion, monitor effects and side effects   Outcome: Progressing  Goal: Refrain from isolation  Description: Interventions:  - Develop a trusting relationship   - Encourage socialization   Outcome: Progressing  Goal: Refrain from self-neglect  Outcome: Progressing     Problem: Anxiety  Goal: Anxiety is at manageable level  Description: Interventions:  - Assess and monitor patient's  anxiety level.   - Monitor for signs and symptoms (heart palpitations, chest pain, shortness of breath, headaches, nausea, feeling jumpy, restlessness, irritable, apprehensive).   - Collaborate with interdisciplinary team and initiate plan and interventions as ordered.  - [place] patient to unit/surroundings  - Explain treatment plan  - Encourage participation in care  - Encourage verbalization of concerns/fears  - Identify coping mechanisms  - Assist in developing anxiety-reducing skills  - Administer/offer alternative therapies  - Limit or eliminate stimulants  Outcome: Progressing     Problem: Alteration in Orientation  Goal: Treatment Goal: Demonstrate a reduction of confusion and improved orientation to person, place, time and/or situation upon discharge, according to optimum baseline/ability  Outcome: Progressing  Goal: Interact with staff daily  Description: Interventions:  - Assess and re-assess patient's level of orientation  - Engage patient in 1 on 1 interactions, daily, for a minimum of 15 minutes   - Establish rapport/trust with patient   Outcome: Progressing  Goal: Allow medical examinations, as recommended  Description: Interventions:  - Provide physical/neurological exams and/or referrals, per provider   Outcome: Progressing  Goal: Cooperate with recommended testing/procedures  Description: Interventions:  - Determine need for ancillary testing  - Observe for mental status changes  - Implement falls/precaution protocol   Outcome: Progressing  Goal: Complete daily ADLs, including personal hygiene independently, as able  Description: Interventions:  - Observe, teach, and assist patient with ADLS  Outcome: Progressing     Problem: DISCHARGE PLANNING  Goal: Discharge to home with appropriate resources  Description: INTERVENTIONS:  - Identify barriers to discharge w/patient and caregiver  - Arrange for needed discharge resources and transportation as appropriate  - Identify discharge learning needs (meds,  wound care, etc.)  - Refer to Case Management Department for coordinating discharge planning if the patient needs post-hospital services based on physician/advanced practitioner order or complex needs related to functional status, cognitive ability, or social support system  Outcome: Progressing

## 2025-01-24 NOTE — PROGRESS NOTES
Progress Note - Behavioral Health   Name: Alberto Berumen 29 y.o. male I MRN: [id number]  Unit/Bed#: [facility] 112-02 I Date of Admission: 3/29/2024   Date of Service: 1/24/2025 I Hospital Day: 301     Assessment & Plan  Schizoaffective disorder, bipolar type (HCC)  Reviewed on 01/24/25  No significant changes still with same threatening voices as before not commanding and planning to have a virtual visit with his sisters  Continue medication as follows:  Continue Clozapine 250 mg QHS for psychosis - increased 12/11/24 01/21: ANC 3.95,   To consider further careful titration  CBC w/diff and CK every 2 weeks  CK was increased to 960 on 12/12 and oral hydration encouraged, CK improved to 905 on 12/13/24. Repeat on 12/17/24 decreased to 674 and on 12/20/24 decreased to 448  Labs  drawn on 12/24/2024, repeat  trending down  Labs drawn 01/15, ANC 4.01  Labs drawn 01/08,   Labs drawn 01/15   01/22 Increase Cobenfy to 100/20 mg BID for psychosis  Continue to monitor for toleration. Currently reporting intermittent nausea and blurry vision when reading and xerostomia.   Abilify D/ferny  Continue Lexapro 20 mg daily for depression and anxiety  Robinul D/ferny on 01/13  Atropine D/ferny on 01/13  Continue Propanolol 10 mg BID for anxiety   Continue Melatonin 9 mg QHS for sleep  Continue Senna-Docusate sodium one 50 mg tablet QHS for constipation  Continue Zofran 4 mg tablet Q6H PRN for nausea   Start PRN mouth kote for xerostomia 1/18/25    Pt remains on dual antipsychotic Tx due to failure on monotherapy   .  S/p ECT treatments.  - ACT team referral was made for him living back with his aunt once MA funding comes through from the [facility] and sister will try to reapply    Chronic idiopathic constipation  Reviewed on 01/24/25  Follows with medical team  Continue senna-docusate sodium dose per medical team.   Last BM 1/17/25  GERD (gastroesophageal reflux disease)  Reviewed on 01/24/25  Follows with  medical  Continue famotidine 20 mg tablet BID per medical team   Continue pantoprazole EC 20 mg tablet every morning per medical team  This is a chronic condition, and is stable unless otherwise noted.    Tobacco abuse  Reviewed on 01/24/25  on nicotine gum as needed and encouraged smoking cessation  Continue to encourage cessation upon discharge    Elevated hemoglobin A1c  Reviewed on 01/24/25  Follows with medical team  Class 2 obesity in adult  Reviewed on 01/24/25  Follows with medical team and given dietary counseling and need for exercise    Primary hypertension  Reviewed on 01/24/25  Follows with medical team on hydrochlorothiazide and Norvasc      Psychiatry Progress Note [facility]    Progress towards goals: moderate progress    Review of systems: denied    Psychiatric Diagnosis: Schizoaffective disorder, bipolar type     Assessment  Overall Status:  progressing, no significant changes   Certification Statement: The patient will continue to require additional inpatient hospital stay due to psychotic related symptoms and limited responses to medications and ECT.        Medications: as above  Side effects from treatment: dry mouth, nausea, blurred vision   Medication changes: as above  Medication education: Risks side effects benefits and precautions of medications discussed with patient and they did verbalize an understanding about risks for metabolic syndrome from being on neuroleptics and tardive dyskinesia etc.  All medications reviewed and I recommend that they be continued for symptom management  Understanding of medications: Risks side effects benefits and precautions of medications discussed with patient and they did verbalize an understanding about risks for metabolic syndrome from being on neuroleptics and tardive dyskinesia etc., and patient appeared to have understanding.  Justification for dual anti-psychotics: due to not responding to single  "antipsychotic    Non-pharmacological treatments  Continue with individual, group, milieu and occupational therapy using recovery principles and psycho-education about accepting illness and the need for treatment.  Behavioral health checks every 7 minutes    Safety  Safety and communication plan established to target dynamic risk factors discussed above.    Discharge Plan   To his Aunt with [facility] for ACT when received     Interval Progress:   Per treatment team, Alberto has had no significant changes in his behavior or status. He had a virtual visit with his brother yesterday that went well. He continues to be pleasant, cooperative, visible and social. He went to 8/10 groups.    Today, Alberto reports his mood as \"tired\". He said he slept okay last night and he continues to have a stable appetite. He said he does not have any plans for today. He denied SI/HI or passive wishes of death. He reports continued auditory hallucinations of voices that say they are going to kill him when he leaves here. He denies command hallucinations. He said yesterday that he did not experience any side effects of his new medication, which is the first day he has shown that much improvement.     Acceptance by patient: accepts inpatient treatment and medications  Hopefulness in recovery: to live with his aunt in the community  Involved in reintegration process: communicates with siblings and aunt   Trusting in relationship with psychiatrist: trusting  Sleep: good  Appetite: good  Compliance with Medications: compliant  Group attendance: 8/10 groups   Significant events: none in the last 24 hours     Mental Status Exam  Appearance: age appropriate, casually dressed in hospital pants and a sweatshirt, laying in bed asleep with a blanket pulled over his face, adequate hygiene   Behavior: calm, cooperative, no eye contact, polite, easily aroused from sleep, kept blanket over his face during conversation  Speech: decreased rate, " "delayed, soft, decreased volume, monotone  Mood: \"tired\"  Affect: constricted, mood-congruent  Thought Process: organized, logical, goal directed, coherent, linear   Thought Content: no overt delusions, some paranoia, intrusive thoughts   Perceptual Disturbances:  continued auditory hallucinations of voices that say they are going to kill him when he leaves here. He denies command hallucinations or the hallucinations involving his family. Not observed responding to internal stimuli or appearing preoccupied.   Risk Potential: Suicidal Ideations none, Homicidal Ideations none, and Potential for Aggression No  Sensorium: alert and oriented to person, place, time and situation  Cognition: recent and remote memory grossly intact  Consciousness: alert and awake  Attention: attention span and concentration are age appropriate  Intellect: appears to be of average intelligence  Insight: intact  Judgement: intact  Motor Activity: no abnormal movements     Vitals  Temp:  [97.5 °F (36.4 °C)-97.9 °F (36.6 °C)] 97.5 °F (36.4 °C)  HR:  [78-91] 78  Resp:  [18] 18  BP: (122-136)/(60-77) 134/71  SpO2:  [98 %-99 %] 98 %  No intake or output data in the 24 hours ending 01/24/25 0947    Lab Results: All Labs For Current Hospital Admission Reviewed  Results from last 7 days   Lab Units 01/21/25  0536   WBC Thousand/uL 8.93   RBC Million/uL 5.53   HEMOGLOBIN g/dL 12.8   HEMATOCRIT % 42.7   MCV fL 77*   PLATELETS Thousands/uL 244   TOTAL NEUT ABS Thousands/µL 3.95       Current Facility-Administered Medications   Medication Dose Route Frequency Provider Last Rate    acetaminophen  650 mg Oral Q4H PRN Jordan C Holter, DO      acetaminophen  650 mg Oral Q6H PRN HOLLI Lion      aluminum-magnesium hydroxide-simethicone  30 mL Oral Q4H PRN Jordan C Holter, DO      amLODIPine  5 mg Oral Daily HOLLI Lion      Artificial Tears  1 drop Both Eyes Q3H PRN Jordan C Holter, DO      haloperidol lactate  2.5 mg Intramuscular Q4H PRN Max " 4/day HOLLI Lion      And    LORazepam  1 mg Intramuscular Q4H PRN Max 4/day HOLLI Lion      And    benztropine  0.5 mg Intramuscular Q4H PRN Max 4/day HOLLI Lion      haloperidol lactate  5 mg Intramuscular Q4H PRN Max 4/day HOLLI Lion      And    LORazepam  2 mg Intramuscular Q4H PRN Max 4/day HOLLI Lion      And    benztropine  1 mg Intramuscular Q4H PRN Max 4/day HOLLI Lion      bisacodyl  10 mg Rectal Daily PRN HOLLI Lion      calcium carbonate  500 mg Oral BID PRN HOLLI Monge      cloZAPine  250 mg Oral HS Bora Rosario MD      hydrOXYzine HCL  50 mg Oral Q6H PRN Max 4/day Jordan C Holter,       Or    diphenhydrAMINE  50 mg Intramuscular Q6H PRN Jordan C Holter, DO      hydrOXYzine HCL  50 mg Oral Q6H PRN Max 4/day HOLLI Lion      Or    diphenhydrAMINE  50 mg Intramuscular Q6H PRN HOLLI Lion      diphenhydrAMINE-zinc acetate   Topical BID PRN HOLLI Lion      docusate sodium  100 mg Oral BID Jourdan Dickens,       escitalopram  20 mg Oral Daily HOLLI Lion      famotidine  20 mg Oral BID PRN HOLLI Monge      fluticasone  1 spray Each Nare Daily PRN HOLLI Monge      haloperidol  1 mg Oral Q6H PRN HOLLI Lion      haloperidol  2.5 mg Oral Q4H PRN Max 4/day HOLLI Lion      haloperidol  5 mg Oral Q4H PRN Max 4/day HOLLI Lion      hydroCHLOROthiazide  12.5 mg Oral Daily HOLLI Galvan      hydrocortisone   Topical 4x Daily PRN HOLLI Lion      hydrOXYzine HCL  100 mg Oral Q6H PRN Max 4/day Jordan C Holter, DO      Or    LORazepam  2 mg Intramuscular Q6H PRN Jordan C Holter, DO      hydrOXYzine HCL  100 mg Oral Q6H PRN Max 4/day HOLLI Lion      Or    LORazepam  2 mg Intramuscular Q6H PRN HOLLI Lion      hydrOXYzine HCL  25 mg Oral Q6H PRN Max 4/day Jordan C Holter, DO      ibuprofen  600 mg Oral Q8H PRN Eveline Hunt,  HOLLI      influenza vaccine  0.5 mL Intramuscular Once Bora Rosario MD      loratadine  10 mg Oral Daily Brina Guillen MD      magnesium hydroxide  30 mL Oral Once Jourdan Dickens, DO      melatonin  3 mg Oral HS PRN Bora Rosario MD      melatonin  9 mg Oral HS Bora Rosario MD      methocarbamol  500 mg Oral Q6H PRN HOLLI Lion      multivitamin-minerals  1 tablet Oral Daily Eileen Gonzalezmiryamjaylen, HOLLI      nicotine polacrilex  2 mg Oral Q4H PRN Bora Rosario MD      OLANZapine  5 mg Oral Q4H PRN Max 3/day [facility] Holter, DO      Or    OLANZapine  2.5 mg Intramuscular Q4H PRN Max 3/day [facility] Holter, DO      OLANZapine  5 mg Oral Q3H PRN Max 3/day [facility] Holter, DO      Or    OLANZapine  5 mg Intramuscular Q3H PRN Max 3/day [facility] Holter, DO      OLANZapine  2.5 mg Oral Q4H PRN Max 6/day [facility] Holter, DO      ondansetron  4 mg Oral Q6H PRN HOLLI Lion      pantoprazole  20 mg Oral QAM [facility] Holter, DO      polyethylene glycol  17 g Oral Daily PRN Sofi Yoo, HOLLI      polyethylene glycol  17 g Oral Daily Jourdan Dickens, DO      propranolol  10 mg Oral Q12H KAYLIE HOLLI Lion      saliva substitute  5 spray Mouth/Throat 4x Daily PRN Mary Jo Reich PA-C      senna-docusate sodium  1 tablet Oral Daily PRN [facility] Holter, DO      senna-docusate sodium  2 tablet Oral HS Jourdan Dickens, DO      traZODone  50 mg Oral HS PRN HOLLI Lion      white petrolatum-mineral oil   Topical TID PRN HOLLI Lion      Xanomeline-Trospium Chloride  1 capsule Oral BID Bora Rosario MD         Counseling / Coordination of Care: Total floor / unit time spent today 15 minutes. Greater than 50% of total time was spent with the patient and / or family counseling and / or somewhat receptive to supportive listening and teaching positive coping skills to deal with symptom mangement.     Patient's Rights, confidentiality and exceptions to confidentiality, use of automated medical record,  Behavioral Health Services staff access to medical record, and consent to treatment reviewed.    This note has been dictated and hence there may be problems with punctuation, spelling and formatting and if anyone has any concerns please address them to Dr. Rosario  This note is not shared with patient due to potential for making patient's condition worse by knowing the content of the note.    Written by Leidy WALLACE and supervised by Dr. Rosario

## 2025-01-24 NOTE — NURSING NOTE
"Alberto is calm and pleasant. Social with select peers. Compliant with meds and meals. Visible on unit. Has virtual visit @1830 with his brother, which he reports went well. Nicorette gum at 1713. Still reports \"hearing voices\"  Denies SI/HI. No new concerns voiced.  Myles aguilar at 2115    "

## 2025-01-24 NOTE — ASSESSMENT & PLAN NOTE
Reviewed on 01/24/25  Follows with medical  Continue famotidine 20 mg tablet BID per medical team   Continue pantoprazole EC 20 mg tablet every morning per medical team  This is a chronic condition, and is stable unless otherwise noted.

## 2025-01-24 NOTE — PROGRESS NOTES
Progress Note - Behavioral Health   Name: Alberto Berumen  29 y.o. male  MRN: [id number]   Unit/Bed#: [facility] 112-02  Admission Date: 3/29/2024   Date of Service: 01/24/25      Assessment & Plan  Schizoaffective disorder, bipolar type (HCC)  Reviewed on 01/24/25  No significant changes still with same threatening voices as before not commanding and planning to have a virtual visit with his sisters  Continue medication as follows:  Continue Clozapine 250 mg QHS for psychosis - increased 12/11/24 01/21: ANC 3.95,   To consider further careful titration  CBC w/diff and CK every 2 weeks  CK was increased to 960 on 12/12 and oral hydration encouraged, CK improved to 905 on 12/13/24. Repeat on 12/17/24 decreased to 674 and on 12/20/24 decreased to 448  Labs  drawn on 12/24/2024, repeat  trending down  Labs drawn 01/15, ANC 4.01  Labs drawn 01/08,   Labs drawn 01/15   01/22 Increase Cobenfy to 100/20 mg BID for psychosis  Continue to monitor for toleration. Currently reporting intermittent nausea and blurry vision when reading and xerostomia.   Abilify D/ferny  Continue Lexapro 20 mg daily for depression and anxiety  Robinul D/ferny on 01/13  Atropine D/ferny on 01/13  Continue Propanolol 10 mg BID for anxiety   Continue Melatonin 9 mg QHS for sleep  Continue Senna-Docusate sodium one 50 mg tablet QHS for constipation  Continue Zofran 4 mg tablet Q6H PRN for nausea   Start PRN mouth kote for xerostomia 1/18/25    Pt remains on dual antipsychotic Tx due to failure on monotherapy   .  S/p ECT treatments.  - ACT team referral was made for him living back with his aunt once MA funding comes through from the [facility] and sister will try to reapply      No associated orders from this encounter found during lookback period of 72 hours.    Chronic idiopathic constipation  Reviewed on 01/24/25  Follows with medical team  Continue senna-docusate sodium dose per medical team.   Last BM 1/17/25    No  associated orders from this encounter found during lookback period of 72 hours.  GERD (gastroesophageal reflux disease)  Reviewed on 01/24/25  Follows with medical  Continue famotidine 20 mg tablet BID per medical team   Continue pantoprazole EC 20 mg tablet every morning per medical team  This is a chronic condition, and is stable unless otherwise noted.      No associated orders from this encounter found during lookback period of 72 hours.  Tobacco abuse  Reviewed on 01/24/25  on nicotine gum as needed and encouraged smoking cessation  Continue to encourage cessation upon discharge      No associated orders from this encounter found during lookback period of 72 hours.  Elevated hemoglobin A1c  Reviewed on 01/24/25  Follows with medical team    No associated orders from this encounter found during lookback period of 72 hours.  Class 2 obesity in adult  Reviewed on 01/24/25  Follows with medical team and given dietary counseling and need for exercise      No associated orders from this encounter found during lookback period of 72 hours.    Primary hypertension  Reviewed on 01/24/25  Follows with medical team on hydrochlorothiazide and Norvasc      No associated orders from this encounter found during lookback period of 72 hours.    Plan:  1. Continue treatment at [facility] for safety and treatment of Schizoaffective Disorder, Bipolar Type  2. Continue standard q 15 minute observations as no 1:1 CO needed at this time as patient feels safe on the unit.  3. Psych - Continue Clozaril 250 mg HS for mood and psychosis; Lexapro 20 mg Daily for depression; Melatonin 9 mg HS for sleep; Inderal 10 mg Q 12 hours for restlessness;  4. Medical- standard care   5. Will coordinate discharge planning with case management to include referrals for outpatient follow up.       Subjective: I saw Alberto for follow up and continuation of care. I have reviewed the chart and discussed progress with the treatment  "team. Patient is calm and cooperative with care. Per nursing he is visible in the milieu and socializes with select peers. Over the last few days he has reported several side effects from his new medication since the increase on 1/22..  Alberto is medication and meal compliant.  He is attending some groups. Alberto remains in good behavorial control. PRNs in the last 24 hours include: Nicorette Gum (1/24 x4).    On assessment, Alberto reports moderate depression, rating it 6/10, and denied all anxiety symptoms. He denies suicidal/ homicidal ideations, plan, intent, self-injurious behaviors or urges and contracts for safety on the unit. Alberto does not voice any paranoia or delusions. He denies visual hallucinations. Alberto reports AH, non commanding. He reports the voices tell him that they are going to kill him. He hears the voices daily, all throughout the day, but they are the worst at night. He admits to recognizing the voice at times, as a voice of family or friends. The new medications have helped decrease the frequency/severity of the AH slightly, but they persist overall. Alberto reports the medication has helped him \"stay out of his head more\". Patient reports his preferred coping skills are listening to music and spending time with family.    Behavior over the last 24 hours: unchanged   Sleep:  Adequate  Appetite:  Adequate  Medication side effects: Yes - nausea, dry mouth, neck/throat soreness    ROS: no complaints    Mental Status Evaluation:    Appearance:  dressed appropriately, marginal hygiene, no distress   Behavior:  pleasant, cooperative, calm   Speech:  normal rate and volume   Mood:  depressed   Affect:  constricted   Thought Process:  goal directed, linear   Associations: concrete associations   Thought Content:  no overt delusions, negative thoughts, intrusive thoughts   Perceptual Disturbances: auditory hallucinations of voices telling him that they are going to kill him   Risk Potential: " Suicidal ideation - None  Homicidal ideation - None  Potential for aggression - Not at present   Sensorium:  oriented to person, place, and time/date   Memory:  recent and remote memory grossly intact   Consciousness:  alert and awake   Attention/Concentration: attention span and concentration appear shorter than expected for age   Insight:  partial   Judgment: partial   Gait/ Station: Normal gait/ station   Motor movements: No abnormal movements     Suicide/Homicide Risk Assessment:  Risk of Harm to Self:   Nursing Suicide Risk Assessment Last 24 hours: C-SSRS Risk (Since Last Contact)  Calculated C-SSRS Risk Score (Since Last Contact): No Risk Indicated    Risk of Harm to Others:  Nursing Homicide Risk Assessment: Violence Risk to Others: Denies within past 6 months    Vital signs in last 24 hours:    Temp:  [97.5 °F (36.4 °C)-97.9 °F (36.6 °C)] 97.5 °F (36.4 °C)  HR:  [78-91] 78  BP: (122-136)/(60-77) 134/71  Resp:  [18] 18  SpO2:  [98 %-99 %] 98 %  O2 Device: None (Room air)    Current Facility-Administered Medications   Medication Dose Route Frequency Provider Last Rate    acetaminophen  650 mg Oral Q4H PRN Jordan C Holter,       acetaminophen  650 mg Oral Q6H PRN HOLLI Lion      aluminum-magnesium hydroxide-simethicone  30 mL Oral Q4H PRN Jordan C Holter,       amLODIPine  5 mg Oral Daily HOLLI Lion      Artificial Tears  1 drop Both Eyes Q3H PRN Jordan C Holter, DO      haloperidol lactate  2.5 mg Intramuscular Q4H PRN Max 4/day HOLLI Lion      And    LORazepam  1 mg Intramuscular Q4H PRN Max 4/day HOLLI Lion      And    benztropine  0.5 mg Intramuscular Q4H PRN Max 4/day HOLLI Lion      haloperidol lactate  5 mg Intramuscular Q4H PRN Max 4/day HOLLI Lion      And    LORazepam  2 mg Intramuscular Q4H PRN Max 4/day HOLLI Lion      And    benztropine  1 mg Intramuscular Q4H PRN Max 4/day HOLLI Lion      bisacodyl  10 mg Rectal Daily PRN Eveline  HOLLI Hunt      calcium carbonate  500 mg Oral BID PRN HOLLI Monge      cloZAPine  250 mg Oral HS Bora Rosario MD      hydrOXYzine HCL  50 mg Oral Q6H PRN Max 4/day Ion FISCHER Holter, DO      Or    diphenhydrAMINE  50 mg Intramuscular Q6H PRN Ion FISCHER Holter, DO      hydrOXYzine HCL  50 mg Oral Q6H PRN Max 4/day HOLLI Lion      Or    diphenhydrAMINE  50 mg Intramuscular Q6H PRN HOLLI Lion      diphenhydrAMINE-zinc acetate   Topical BID PRN HOLLI Lion      docusate sodium  100 mg Oral BID Jourdan Dickens DO      escitalopram  20 mg Oral Daily HOLLI Lion      famotidine  20 mg Oral BID PRN HOLLI Monge      fluticasone  1 spray Each Nare Daily PRN HOLLI Monge      haloperidol  1 mg Oral Q6H PRN HOLLI Lion      haloperidol  2.5 mg Oral Q4H PRN Max 4/day HOLLI Lion      haloperidol  5 mg Oral Q4H PRN Max 4/day HOLLI Lion      hydroCHLOROthiazide  12.5 mg Oral Daily HOLLI Galvan      hydrocortisone   Topical 4x Daily PRN HOLLI Lion      hydrOXYzine HCL  100 mg Oral Q6H PRN Max 4/day Ion FISCHER Holter, DO      Or    LORazepam  2 mg Intramuscular Q6H PRN Ion FISCHER Holter, DO      hydrOXYzine HCL  100 mg Oral Q6H PRN Max 4/day HOLLI Lion      Or    LORazepam  2 mg Intramuscular Q6H PRN HOLLI Lion      hydrOXYzine HCL  25 mg Oral Q6H PRN Max 4/day Ion FISCHER Holter, DO      ibuprofen  600 mg Oral Q8H PRN HOLLI Lion      influenza vaccine  0.5 mL Intramuscular Once Bora Rosario MD      loratadine  10 mg Oral Daily Brina Guillen MD      magnesium hydroxide  30 mL Oral Once Jourdan Dickens, DO      melatonin  3 mg Oral HS PRN Bora Rosario MD      melatonin  9 mg Oral HS Bora Rosario MD      methocarbamol  500 mg Oral Q6H PRN HOLLI Lion      multivitamin-minerals  1 tablet Oral Daily HOLLI Monge      nicotine polacrilex  2 mg Oral Q4H PRN Bora Rosario,  MD      OLANZapine  5 mg Oral Q4H PRN Max 3/day [facility] Holter, DO      Or    OLANZapine  2.5 mg Intramuscular Q4H PRN Max 3/day [facility] Holter, DO      OLANZapine  5 mg Oral Q3H PRN Max 3/day [facility] Holter, DO      Or    OLANZapine  5 mg Intramuscular Q3H PRN Max 3/day [facility] Holter, DO      OLANZapine  2.5 mg Oral Q4H PRN Max 6/day [facility] Holter, DO      ondansetron  4 mg Oral Q6H PRN HOLLI Lion      pantoprazole  20 mg Oral QAM [facility] Holter, DO      polyethylene glycol  17 g Oral Daily PRN Sofi Yoo, HOLLI      polyethylene glycol  17 g Oral Daily Jourdan Dickens, DO      propranolol  10 mg Oral Q12H KAYLIE HOLLI Lion      saliva substitute  5 spray Mouth/Throat 4x Daily PRN Mary Jo Reich PA-C      senna-docusate sodium  1 tablet Oral Daily PRN [facility] Cresencioter, DO      senna-docusate sodium  2 tablet Oral HS Jourdan Dickens, DO      traZODone  50 mg Oral HS PRN HOLLI Lion      white petrolatum-mineral oil   Topical TID PRN HOLLI Lion      Xanomeline-Trospium Chloride  1 capsule Oral BID Bora Rosario MD         Laboratory results: I have personally reviewed all pertinent laboratory/tests results    SS Progress Note Lab Results: Results from the past 24 hours: No results found for this or any previous visit (from the past 24 hours).    Progress Toward Goals: Unchanged - Patient is visible in the milieu and interacts with some peers. He attends some groups. Alberto has ongoing AH/psychosis symptoms.    Risks / Benefits of Treatment:  Risks, benefits, and possible side effects of medications explained to patient and patient verbalizes understanding and agreement for treatment.    Counseling / Coordination of Care:    Total floor / unit time spent today 15 minutes. Greater than 50% of total time was spent with the patient and / or family counseling and / or coordination of care. A description of counseling / coordination of care:  Patient's progress discussed with staff  in treatment team meeting.  Medication changes reviewed with staff in treatment team meeting.  Medications, treatment progress and treatment plan reviewed with patient.  Importance of medication and treatment compliance reviewed with patient.  Cognitive techniques utilized during the session.  Reassurance and supportive therapy provided.  Encouraged participation in milieu and group therapy on the unit.      HOLLI Salmeron 01/24/25

## 2025-01-24 NOTE — ASSESSMENT & PLAN NOTE
Reviewed on 01/24/25  Follows with medical team and given dietary counseling and need for exercise

## 2025-01-24 NOTE — ASSESSMENT & PLAN NOTE
Reviewed on 01/24/25  Follows with medical  Continue famotidine 20 mg tablet BID per medical team   Continue pantoprazole EC 20 mg tablet every morning per medical team  This is a chronic condition, and is stable unless otherwise noted.      No associated orders from this encounter found during lookback period of 72 hours.

## 2025-01-24 NOTE — ASSESSMENT & PLAN NOTE
Reviewed on 01/24/25  Follows with medical team and given dietary counseling and need for exercise      No associated orders from this encounter found during lookback period of 72 hours.

## 2025-01-24 NOTE — ASSESSMENT & PLAN NOTE
Reviewed on 01/24/25  Follows with medical team  Continue senna-docusate sodium dose per medical team.   Last BM 1/17/25

## 2025-01-24 NOTE — ASSESSMENT & PLAN NOTE
Reviewed on 01/24/25  Follows with medical team  Continue senna-docusate sodium dose per medical team.   Last BM 1/17/25    No associated orders from this encounter found during lookback period of 72 hours.

## 2025-01-24 NOTE — ASSESSMENT & PLAN NOTE
Reviewed on 01/24/25  Follows with medical team    No associated orders from this encounter found during lookback period of 72 hours.

## 2025-01-24 NOTE — ASSESSMENT & PLAN NOTE
Reviewed on 01/24/25  No significant changes still with same threatening voices as before not commanding and planning to have a virtual visit with his sisters  Continue medication as follows:  Continue Clozapine 250 mg QHS for psychosis - increased 12/11/24 01/21: ANC 3.95,   To consider further careful titration  CBC w/diff and CK every 2 weeks  CK was increased to 960 on 12/12 and oral hydration encouraged, CK improved to 905 on 12/13/24. Repeat on 12/17/24 decreased to 674 and on 12/20/24 decreased to 448  Labs  drawn on 12/24/2024, repeat  trending down  Labs drawn 01/15, ANC 4.01  Labs drawn 01/08,   Labs drawn 01/15   01/22 Increase Cobenfy to 100/20 mg BID for psychosis  Continue to monitor for toleration. Currently reporting intermittent nausea and blurry vision when reading and xerostomia.   Abilify D/ferny  Continue Lexapro 20 mg daily for depression and anxiety  Robinul D/ferny on 01/13  Atropine D/ferny on 01/13  Continue Propanolol 10 mg BID for anxiety   Continue Melatonin 9 mg QHS for sleep  Continue Senna-Docusate sodium one 50 mg tablet QHS for constipation  Continue Zofran 4 mg tablet Q6H PRN for nausea   Start PRN mouth kote for xerostomia 1/18/25    Pt remains on dual antipsychotic Tx due to failure on monotherapy   .  S/p ECT treatments.  - ACT team referral was made for him living back with his aunt once MA funding comes through from the [facility] and sister will try to reapply

## 2025-01-24 NOTE — ASSESSMENT & PLAN NOTE
Reviewed on 01/24/25  on nicotine gum as needed and encouraged smoking cessation  Continue to encourage cessation upon discharge

## 2025-01-24 NOTE — NURSING NOTE
Alberto was calm and cooperative during assessment. He reports mild depression and AH of a voice stating it will hurt him and his family, which he states is stable today at a manageable level without PRNs. He attended meals in the dining room and groups in the milieu. He was medication compliant and accepted PRN nicorette gum. He denies new concerns at this time.

## 2025-01-25 PROCEDURE — 99232 SBSQ HOSP IP/OBS MODERATE 35: CPT

## 2025-01-25 RX ADMIN — MULTIPLE VITAMINS W/ MINERALS TAB 1 TABLET: TAB ORAL at 09:30

## 2025-01-25 RX ADMIN — NICOTINE POLACRILEX 2 MG: 2 GUM, CHEWING ORAL at 21:37

## 2025-01-25 RX ADMIN — CLOZAPINE 250 MG: 25 TABLET ORAL at 21:12

## 2025-01-25 RX ADMIN — MELATONIN TAB 3 MG 9 MG: 3 TAB at 21:13

## 2025-01-25 RX ADMIN — NICOTINE POLACRILEX 2 MG: 2 GUM, CHEWING ORAL at 09:29

## 2025-01-25 RX ADMIN — Medication 5 SPRAY: at 18:46

## 2025-01-25 RX ADMIN — PROPRANOLOL HYDROCHLORIDE 10 MG: 10 TABLET ORAL at 09:30

## 2025-01-25 RX ADMIN — DOCUSATE SODIUM 100 MG: 100 CAPSULE, LIQUID FILLED ORAL at 17:05

## 2025-01-25 RX ADMIN — LORATADINE 10 MG: 10 TABLET ORAL at 09:29

## 2025-01-25 RX ADMIN — NICOTINE POLACRILEX 2 MG: 2 GUM, CHEWING ORAL at 17:37

## 2025-01-25 RX ADMIN — NICOTINE POLACRILEX 2 MG: 2 GUM, CHEWING ORAL at 13:37

## 2025-01-25 RX ADMIN — ESCITALOPRAM OXALATE 20 MG: 10 TABLET, FILM COATED ORAL at 09:29

## 2025-01-25 RX ADMIN — SENNOSIDES AND DOCUSATE SODIUM 2 TABLET: 50; 8.6 TABLET ORAL at 21:13

## 2025-01-25 RX ADMIN — HYDROCHLOROTHIAZIDE 12.5 MG: 12.5 TABLET ORAL at 09:30

## 2025-01-25 RX ADMIN — DOCUSATE SODIUM 100 MG: 100 CAPSULE, LIQUID FILLED ORAL at 09:30

## 2025-01-25 RX ADMIN — XANOMELINE AND TROSPIUM CHLORIDE 1 CAPSULE: 20; 100 CAPSULE, COATED PELLETS ORAL at 09:30

## 2025-01-25 RX ADMIN — ACETAMINOPHEN 650 MG: 325 TABLET, FILM COATED ORAL at 10:35

## 2025-01-25 RX ADMIN — PANTOPRAZOLE SODIUM 20 MG: 20 TABLET, DELAYED RELEASE ORAL at 09:29

## 2025-01-25 RX ADMIN — ONDANSETRON 4 MG: 4 TABLET, ORALLY DISINTEGRATING ORAL at 10:04

## 2025-01-25 RX ADMIN — XANOMELINE AND TROSPIUM CHLORIDE 100 CAPSULE: 20; 100 CAPSULE, COATED PELLETS ORAL at 17:05

## 2025-01-25 RX ADMIN — PROPRANOLOL HYDROCHLORIDE 10 MG: 10 TABLET ORAL at 21:12

## 2025-01-25 NOTE — NURSING NOTE
"Prn Zofran given as ordered for report of nausea and unwitnessed vomiting. Pt reports \" I think its that new pill, I don't like what it does to me\". Pt educated to have writer see any future vomit and verbalized understanding.   "

## 2025-01-25 NOTE — PLAN OF CARE
Problem: Alteration in Thoughts and Perception  Goal: Treatment Goal: Gain control of psychotic behaviors/thinking, reduce/eliminate presenting symptoms and demonstrate improved reality functioning upon discharge  Outcome: Progressing  Goal: Refrain from acting on delusional thinking/internal stimuli  Description: Interventions:  - Monitor patient closely, per order   - Utilize least restrictive measures   - Set reasonable limits, give positive feedback for acceptable   - Administer medications as ordered and monitor of potential side effects  Outcome: Progressing  Goal: Agree to be compliant with medication regime, as prescribed and report medication side effects  Description: Interventions:  - Offer appropriate PRN medication and supervise ingestion; conduct AIMS, as needed   Outcome: Progressing  Goal: Recognize dysfunctional thoughts, communicate reality-based thoughts at the time of discharge  Description: Interventions:  - Provide medication and psycho-education to assist patient in compliance and developing insight into his/her illness   Outcome: Progressing     Problem: Ineffective Coping  Goal: Identifies ineffective coping skills  Outcome: Progressing  Goal: Identifies healthy coping skills  Outcome: Progressing  Goal: Demonstrates healthy coping skills  Outcome: Progressing  Goal: Participates in unit activities  Description: Interventions:  - Provide therapeutic environment   - Provide required programming   - Redirect inappropriate behaviors   Outcome: Progressing  Goal: Patient/Family participate in treatment and DC plans  Description: Interventions:  - Provide therapeutic environment  Outcome: Progressing  Goal: Patient/Family verbalizes awareness of resources  Outcome: Progressing     Problem: Depression  Goal: Treatment Goal: Demonstrate behavioral control of depressive symptoms, verbalize feelings of improved mood/affect, and adopt new coping skills prior to discharge  Outcome: Progressing  Goal:  Verbalize thoughts and feelings  Description: Interventions:  - Assess and re-assess patient's level of risk   - Engage patient in 1:1 interactions, daily, for a minimum of 15 minutes   - Encourage patient to express feelings, fears, frustrations, hopes   Outcome: Progressing  Goal: Refrain from harming self  Description: Interventions:  - Monitor patient closely, per order   - Supervise medication ingestion, monitor effects and side effects   Outcome: Progressing  Goal: Refrain from isolation  Description: Interventions:  - Develop a trusting relationship   - Encourage socialization   Outcome: Progressing  Goal: Refrain from self-neglect  Outcome: Progressing     Problem: Anxiety  Goal: Anxiety is at manageable level  Description: Interventions:  - Assess and monitor patient's anxiety level.   - Monitor for signs and symptoms (heart palpitations, chest pain, shortness of breath, headaches, nausea, feeling jumpy, restlessness, irritable, apprehensive).   - Collaborate with interdisciplinary team and initiate plan and interventions as ordered.  - [place] patient to unit/surroundings  - Explain treatment plan  - Encourage participation in care  - Encourage verbalization of concerns/fears  - Identify coping mechanisms  - Assist in developing anxiety-reducing skills  - Administer/offer alternative therapies  - Limit or eliminate stimulants  Outcome: Progressing     Problem: Alteration in Orientation  Goal: Treatment Goal: Demonstrate a reduction of confusion and improved orientation to person, place, time and/or situation upon discharge, according to optimum baseline/ability  Outcome: Progressing  Goal: Interact with staff daily  Description: Interventions:  - Assess and re-assess patient's level of orientation  - Engage patient in 1 on 1 interactions, daily, for a minimum of 15 minutes   - Establish rapport/trust with patient   Outcome: Progressing  Goal: Express concerns related to confused thinking related  to:  Description: Interventions:  - Encourage patient to express feelings, fears, frustrations, hopes  - Assign consistent caregivers   - [place]/re-orient patient as needed  - Allow comfort items, as appropriate  - Provide visual cues, signs, etc.   Outcome: Progressing  Goal: Allow medical examinations, as recommended  Description: Interventions:  - Provide physical/neurological exams and/or referrals, per provider   Outcome: Progressing  Goal: Cooperate with recommended testing/procedures  Description: Interventions:  - Determine need for ancillary testing  - Observe for mental status changes  - Implement falls/precaution protocol   Outcome: Progressing  Goal: Complete daily ADLs, including personal hygiene independently, as able  Description: Interventions:  - Observe, teach, and assist patient with ADLS  Outcome: Progressing

## 2025-01-25 NOTE — NURSING NOTE
"Pt is visible in the milieu and social with select peers. He consumed 100 % of dinner. Took his medications with prompting. Pt said, \"am I allowed to refuse to take this (cobenfy)? Will they make me get an injection if I refuse?\" Pt reports that he does not like the side effects from the Cobenfy such as sore throat/neck pain and dry mouth. Pt encouraged to discuss with doctor. Attended Movie and Wrap up groups.  No behavioral issues.   "

## 2025-01-25 NOTE — NURSING NOTE
Alberto has been pleasant, cooperative and visible out and about in the milieu. Social with staff and select peers. Admits to depression and auditory hallucinations. Participated in PM Walk and Coping Skills group. Ate snack and took HS medication without difficulty. Had Nicotine gum at 2128. Continue to monitor. Precautions maintained.

## 2025-01-25 NOTE — NURSING NOTE
"Pt is accepting of medications, amlodipine held as per parameters and meal compliant. Pt is polite, pleasant and brightens on approach. Pt reports feeling \" a little better\". Pt reports \"the same\" AH and depression, but \" doing alright\". Pt is visible and social with select peers. No new concerns otherwise.   "

## 2025-01-25 NOTE — NURSING NOTE
"Prn tylenol 650 given as ordered for report of 6/10 throat/neck ache. Pt states \"it just aches, did they increase the dose of that new med, I dont like this\". Patient educated on medication increase on 1/22/25. Pending results.   "

## 2025-01-26 VITALS
HEART RATE: 92 BPM | OXYGEN SATURATION: 100 % | HEIGHT: 66 IN | TEMPERATURE: 97.9 F | SYSTOLIC BLOOD PRESSURE: 120 MMHG | RESPIRATION RATE: 16 BRPM | BODY MASS INDEX: 38.57 KG/M2 | WEIGHT: 240 LBS | DIASTOLIC BLOOD PRESSURE: 64 MMHG

## 2025-01-26 PROCEDURE — 99232 SBSQ HOSP IP/OBS MODERATE 35: CPT

## 2025-01-26 RX ADMIN — PROPRANOLOL HYDROCHLORIDE 10 MG: 10 TABLET ORAL at 21:04

## 2025-01-26 RX ADMIN — PROPRANOLOL HYDROCHLORIDE 10 MG: 10 TABLET ORAL at 09:18

## 2025-01-26 RX ADMIN — LORATADINE 10 MG: 10 TABLET ORAL at 09:18

## 2025-01-26 RX ADMIN — PANTOPRAZOLE SODIUM 20 MG: 20 TABLET, DELAYED RELEASE ORAL at 09:18

## 2025-01-26 RX ADMIN — NICOTINE POLACRILEX 2 MG: 2 GUM, CHEWING ORAL at 21:04

## 2025-01-26 RX ADMIN — AMLODIPINE BESYLATE 5 MG: 5 TABLET ORAL at 09:18

## 2025-01-26 RX ADMIN — NICOTINE POLACRILEX 2 MG: 2 GUM, CHEWING ORAL at 17:06

## 2025-01-26 RX ADMIN — DOCUSATE SODIUM 100 MG: 100 CAPSULE, LIQUID FILLED ORAL at 17:06

## 2025-01-26 RX ADMIN — MELATONIN TAB 3 MG 9 MG: 3 TAB at 21:04

## 2025-01-26 RX ADMIN — XANOMELINE AND TROSPIUM CHLORIDE 1 CAPSULE: 20; 100 CAPSULE, COATED PELLETS ORAL at 09:19

## 2025-01-26 RX ADMIN — MULTIPLE VITAMINS W/ MINERALS TAB 1 TABLET: TAB ORAL at 09:18

## 2025-01-26 RX ADMIN — HYDROCHLOROTHIAZIDE 12.5 MG: 12.5 TABLET ORAL at 09:18

## 2025-01-26 RX ADMIN — XANOMELINE AND TROSPIUM CHLORIDE 100 CAPSULE: 20; 100 CAPSULE, COATED PELLETS ORAL at 17:05

## 2025-01-26 RX ADMIN — DOCUSATE SODIUM 100 MG: 100 CAPSULE, LIQUID FILLED ORAL at 09:18

## 2025-01-26 RX ADMIN — CLOZAPINE 250 MG: 25 TABLET ORAL at 21:04

## 2025-01-26 RX ADMIN — NICOTINE POLACRILEX 2 MG: 2 GUM, CHEWING ORAL at 09:19

## 2025-01-26 RX ADMIN — ESCITALOPRAM OXALATE 20 MG: 10 TABLET, FILM COATED ORAL at 09:18

## 2025-01-26 RX ADMIN — SENNOSIDES AND DOCUSATE SODIUM 2 TABLET: 50; 8.6 TABLET ORAL at 21:04

## 2025-01-26 NOTE — PLAN OF CARE
Problem: Depression  Goal: Refrain from harming self  Description: Interventions:  - Monitor patient closely, per order   - Supervise medication ingestion, monitor effects and side effects   Outcome: Progressing  Goal: Refrain from isolation  Description: Interventions:  - Develop a trusting relationship   - Encourage socialization   Outcome: Progressing  Goal: Refrain from self-neglect  Outcome: Progressing     Problem: Anxiety  Goal: Anxiety is at manageable level  Description: Interventions:  - Assess and monitor patient's anxiety level.   - Monitor for signs and symptoms (heart palpitations, chest pain, shortness of breath, headaches, nausea, feeling jumpy, restlessness, irritable, apprehensive).   - Collaborate with interdisciplinary team and initiate plan and interventions as ordered.  - [place] patient to unit/surroundings  - Explain treatment plan  - Encourage participation in care  - Encourage verbalization of concerns/fears  - Identify coping mechanisms  - Assist in developing anxiety-reducing skills  - Administer/offer alternative therapies  - Limit or eliminate stimulants  Outcome: Progressing     Problem: Alteration in Orientation  Goal: Treatment Goal: Demonstrate a reduction of confusion and improved orientation to person, place, time and/or situation upon discharge, according to optimum baseline/ability  Outcome: Progressing  Goal: Interact with staff daily  Description: Interventions:  - Assess and re-assess patient's level of orientation  - Engage patient in 1 on 1 interactions, daily, for a minimum of 15 minutes   - Establish rapport/trust with patient   Outcome: Progressing  Goal: Express concerns related to confused thinking related to:  Description: Interventions:  - Encourage patient to express feelings, fears, frustrations, hopes  - Assign consistent caregivers   - [place]/re-orient patient as needed  - Allow comfort items, as appropriate  - Provide visual cues, signs, etc.   Outcome:  Progressing  Goal: Allow medical examinations, as recommended  Description: Interventions:  - Provide physical/neurological exams and/or referrals, per provider   Outcome: Progressing  Goal: Cooperate with recommended testing/procedures  Description: Interventions:  - Determine need for ancillary testing  - Observe for mental status changes  - Implement falls/precaution protocol   Outcome: Progressing  Goal: Complete daily ADLs, including personal hygiene independently, as able  Description: Interventions:  - Observe, teach, and assist patient with ADLS  Outcome: Progressing

## 2025-01-26 NOTE — NURSING NOTE
"Pt is accepting of medications, but states \" if that new med, the cobenfy, makes me sick again I'm not taking it later\". Pt at this time has not reported feeling ill in any way after medications. Pt is meal compliant with lunch, but refused breakfast. Pt is polite, pleasant and social with select peers after napping most of the morning. More visible after lunch. Pt reports the same AH and depression, but denies all other s/s. No new concerns otherwise.   "

## 2025-01-26 NOTE — PROGRESS NOTES
Progress Note - Behavioral Health   Name: Alberto Berumen  29 y.o. male  MRN: [id number]   Unit/Bed#: [facility] 112-02  Admission Date: 3/29/2024   Date of Service: 01/26/25      Assessment & Plan  Schizoaffective disorder, bipolar type (HCC)  Reviewed on 01/24/25  No significant changes still with same threatening voices as before not commanding and planning to have a virtual visit with his sisters  Continue medication as follows:  Continue Clozapine 250 mg QHS for psychosis - increased 12/11/24 01/21: ANC 3.95,   To consider further careful titration  CBC w/diff and CK every 2 weeks  CK was increased to 960 on 12/12 and oral hydration encouraged, CK improved to 905 on 12/13/24. Repeat on 12/17/24 decreased to 674 and on 12/20/24 decreased to 448  Labs  drawn on 12/24/2024, repeat  trending down  Labs drawn 01/15, ANC 4.01  Labs drawn 01/08,   Labs drawn 01/15   01/22 Increase Cobenfy to 100/20 mg BID for psychosis  Continue to monitor for toleration. Currently reporting intermittent nausea and blurry vision when reading and xerostomia.   Abilify D/ferny  Continue Lexapro 20 mg daily for depression and anxiety  Robinul D/ferny on 01/13  Atropine D/ferny on 01/13  Continue Propanolol 10 mg BID for anxiety   Continue Melatonin 9 mg QHS for sleep  Continue Senna-Docusate sodium one 50 mg tablet QHS for constipation  Continue Zofran 4 mg tablet Q6H PRN for nausea   Start PRN mouth kote for xerostomia 1/18/25    Pt remains on dual antipsychotic Tx due to failure on monotherapy   .  S/p ECT treatments.  - ACT team referral was made for him living back with his aunt once MA funding comes through from the [facility] and sister will try to reapply      No associated orders from this encounter found during lookback period of 72 hours.    Chronic idiopathic constipation  Reviewed on 01/24/25  Follows with medical team  Continue senna-docusate sodium dose per medical team.   Last BM 1/17/25    No  associated orders from this encounter found during lookback period of 72 hours.  GERD (gastroesophageal reflux disease)  Reviewed on 01/24/25  Follows with medical  Continue famotidine 20 mg tablet BID per medical team   Continue pantoprazole EC 20 mg tablet every morning per medical team  This is a chronic condition, and is stable unless otherwise noted.      No associated orders from this encounter found during lookback period of 72 hours.  Tobacco abuse  Reviewed on 01/24/25  on nicotine gum as needed and encouraged smoking cessation  Continue to encourage cessation upon discharge      No associated orders from this encounter found during lookback period of 72 hours.  Elevated hemoglobin A1c  Reviewed on 01/24/25  Follows with medical team    No associated orders from this encounter found during lookback period of 72 hours.  Class 2 obesity in adult  Reviewed on 01/24/25  Follows with medical team and given dietary counseling and need for exercise      No associated orders from this encounter found during lookback period of 72 hours.    Primary hypertension  Reviewed on 01/24/25  Follows with medical team on hydrochlorothiazide and Norvasc      No associated orders from this encounter found during lookback period of 72 hours.    Plan:  1. Continue treatment at [facility] for safety and treatment of Schizoaffective Disorder, Bipolar Type  2. Continue standard q 15 minute observations as no 1:1 CO needed at this time as patient feels safe on the unit.  3. Psych - Continue Clozaril 250 mg HS for mood and psychosis; Lexapro 20 mg Daily for depression; Melatonin 9 mg HS for sleep; Inderal 10 mg Q 12 hours for restlessness;  4. Medical- standard care   5. Will coordinate discharge planning with case management to include referrals for outpatient follow up    Subjective: I saw Alberto for follow up and continuation of care. I have reviewed the chart and discussed progress with the treatment team.  Patient is visible in the milieu and interacts appropriately with his peers.  He is medication and meal compliant.  He is attending groups. Alberto remains in good behavorial control. PRNs in the last 24 hours include: Tylenol 650 mg (1/25 1035), Nicorette gum (1/25 x 4), Zofran (1/25 1004), and Mouth Kote (1/25 1846).    On assessment, Alberto reports no changes from his presentation yesterday. He was bright upon approach and very respectful. He admitted to moderate depression, and denied anxiety. He continues to report AH of voices telling him they are going to kill him, which he finds distressing. Patient reported nausea and throat discomfort yesterday in regards to medication side effects, but did not report any concerns or side effects today. Patient was again encouraged to remain hydrated to help prevent dry mouth/throat.  Alberto denies suicidal/ homicidal ideations, plan, intent, self-injurious behaviors or urges and contracts for safety on the unit. Alberto does not voice any paranoia or delusions, denies visual hallucinations.    Behavior over the last 24 hours: unchanged   Sleep:  Adequate  Appetite:  Adequate  Medication side effects:  Patient did not report any to this provider today, but has been reporting nausea and sore throat     ROS: no complaints    Mental Status Evaluation:    Appearance:  dressed appropriately, adequate grooming, no distress   Behavior:  pleasant, cooperative, calm   Speech:  normal rate and volume, fluent, clear   Mood:  depressed   Affect:  constricted   Thought Process:  coherent, goal directed, linear   Associations: intact associations   Thought Content:  no overt delusions   Perceptual Disturbances: auditory hallucinations of voices telling him they are going to kill him   Risk Potential: Suicidal ideation - None  Homicidal ideation - None  Potential for aggression - No   Sensorium:  oriented to person, place, and time/date   Memory:  recent and remote memory grossly  intact   Consciousness:  alert and awake   Attention/Concentration: attention span and concentration are age appropriate   Insight:  partial   Judgment: partial   Gait/ Station: Normal gait/ station   Motor movements: No abnormal movements     Suicide/Homicide Risk Assessment:  Risk of Harm to Self:   Nursing Suicide Risk Assessment Last 24 hours: C-SSRS Risk (Since Last Contact)  Calculated C-SSRS Risk Score (Since Last Contact): No Risk Indicated    Risk of Harm to Others:  Nursing Homicide Risk Assessment: Violence Risk to Others: Denies within past 6 months    Vital signs in last 24 hours:    Temp:  [97.9 °F (36.6 °C)] 97.9 °F (36.6 °C)  HR:  [81-88] 81  BP: (123-136)/(77-90) 123/77  Resp:  [18] 18  SpO2:  [100 %] 100 %    Current Facility-Administered Medications   Medication Dose Route Frequency Provider Last Rate    acetaminophen  650 mg Oral Q4H PRN Jordan C Holter, DO      acetaminophen  650 mg Oral Q6H PRN HOLLI Lion      aluminum-magnesium hydroxide-simethicone  30 mL Oral Q4H PRN Jordan C Holter, DO      amLODIPine  5 mg Oral Daily HOLLI Lion      Artificial Tears  1 drop Both Eyes Q3H PRN Jordan C Holter, DO      haloperidol lactate  2.5 mg Intramuscular Q4H PRN Max 4/day HOLLI Lion      And    LORazepam  1 mg Intramuscular Q4H PRN Max 4/day HOLLI Lion      And    benztropine  0.5 mg Intramuscular Q4H PRN Max 4/day HOLLI Lion      haloperidol lactate  5 mg Intramuscular Q4H PRN Max 4/day HOLLI Lion      And    LORazepam  2 mg Intramuscular Q4H PRN Max 4/day HOLLI Lion      And    benztropine  1 mg Intramuscular Q4H PRN Max 4/day HOLLI Lion      bisacodyl  10 mg Rectal Daily PRN HOLLI Lion      calcium carbonate  500 mg Oral BID PRN HOLLI Monge      cloZAPine  250 mg Oral HS Bora Rosario MD      hydrOXYzine HCL  50 mg Oral Q6H PRN Max 4/day Jordan C Holter, DO      Or    diphenhydrAMINE  50 mg Intramuscular Q6H PRN  Jordan C Holter, DO      hydrOXYzine HCL  50 mg Oral Q6H PRN Max 4/day HOLLI Lion      Or    diphenhydrAMINE  50 mg Intramuscular Q6H PRN HOLLI Lino      diphenhydrAMINE-zinc acetate   Topical BID PRN HOLLI Lion      docusate sodium  100 mg Oral BID Jourdan Dickens, DO      escitalopram  20 mg Oral Daily HOLLI Lion      famotidine  20 mg Oral BID PRN HOLLI Monge      fluticasone  1 spray Each Nare Daily PRN HOLLI Monge      haloperidol  1 mg Oral Q6H PRN HOLLI Lion      haloperidol  2.5 mg Oral Q4H PRN Max 4/day HOLLI Lion      haloperidol  5 mg Oral Q4H PRN Max 4/day HOLLI Lion      hydroCHLOROthiazide  12.5 mg Oral Daily Pia Mantilla, HOLLI      hydrocortisone   Topical 4x Daily PRN HOLLI Lion      hydrOXYzine HCL  100 mg Oral Q6H PRN Max 4/day Jordan C Holter, DO      Or    LORazepam  2 mg Intramuscular Q6H PRN Jordan C Holter, DO      hydrOXYzine HCL  100 mg Oral Q6H PRN Max 4/day HOLLI Lion      Or    LORazepam  2 mg Intramuscular Q6H PRN HOLLI Lion      hydrOXYzine HCL  25 mg Oral Q6H PRN Max 4/day Jordan C Holter, DO      ibuprofen  600 mg Oral Q8H PRN HOLLI Lion      influenza vaccine  0.5 mL Intramuscular Once Bora Rosario MD      loratadine  10 mg Oral Daily Brina Guillen MD      magnesium hydroxide  30 mL Oral Once Jourdan Dickens, DO      melatonin  3 mg Oral HS PRN Bora Rosario MD      melatonin  9 mg Oral HS Bora Rosario MD      methocarbamol  500 mg Oral Q6H PRN HOLLI Lion      multivitamin-minerals  1 tablet Oral Daily HOLLI Monge      nicotine polacrilex  2 mg Oral Q4H PRN Bora Rosario MD      OLANZapine  5 mg Oral Q4H PRN Max 3/day Jordan C Holter, DO      Or    OLANZapine  2.5 mg Intramuscular Q4H PRN Max 3/day Jordan C Holter, DO      OLANZapine  5 mg Oral Q3H PRN Max 3/day Ion FISCHER Holter, DO      Or    OLANZapine  5 mg Intramuscular Q3H  PRN Max 3/day Jordan C Holter, DO      OLANZapine  2.5 mg Oral Q4H PRN Max 6/day Jordan C Holter, DO      ondansetron  4 mg Oral Q6H PRN HOLLI Lion      pantoprazole  20 mg Oral QAM Ion Dupontter, DO      polyethylene glycol  17 g Oral Daily PRN Sofi Yoo, HOLLI      polyethylene glycol  17 g Oral Daily Jourdan Dickens, DO      propranolol  10 mg Oral Q12H KAYLIE HOLLI Lion      saliva substitute  5 spray Mouth/Throat 4x Daily PRN Mary Jo Reich PA-C      senna-docusate sodium  1 tablet Oral Daily PRN Jordan C Holter, DO      senna-docusate sodium  2 tablet Oral HS Jourdan Dickens, DO      traZODone  50 mg Oral HS PRN HOLLI Lion      white petrolatum-mineral oil   Topical TID PRN HOLLI Lion      Xanomeline-Trospium Chloride  1 capsule Oral BID Bora Rosario MD         Laboratory results: I have personally reviewed all pertinent laboratory/tests results    SS Progress Note Lab Results: Results from the past 24 hours: No results found for this or any previous visit (from the past 24 hours).    Progress Toward Goals: Unchanged - Patient is visible in the milieu and interacts with some peers. He attends some groups. Alberto has ongoing AH/psychosis symptom    Risks / Benefits of Treatment:  Risks, benefits, and possible side effects of medications explained to patient and patient verbalizes understanding and agreement for treatment.    Counseling / Coordination of Care:    Total floor / unit time spent today 15 minutes. Greater than 50% of total time was spent with the patient and / or family counseling and / or coordination of care. A description of counseling / coordination of care:  Patient's progress discussed with staff in treatment team meeting.  Medication changes reviewed with staff in treatment team meeting.  Medications, treatment progress and treatment plan reviewed with patient.  Importance of medication and treatment compliance reviewed with patient.  Cognitive  techniques utilized during the session.  Reassurance and supportive therapy provided.  Encouraged participation in milieu and group therapy on the unit.      HOLLI Salmeron 01/26/25

## 2025-01-26 NOTE — ASSESSMENT & PLAN NOTE
Reviewed on 01/24/25  No significant changes still with same threatening voices as before not commanding and planning to have a virtual visit with his sisters  Continue medication as follows:  Continue Clozapine 250 mg QHS for psychosis - increased 12/11/24 01/21: ANC 3.95,   To consider further careful titration  CBC w/diff and CK every 2 weeks  CK was increased to 960 on 12/12 and oral hydration encouraged, CK improved to 905 on 12/13/24. Repeat on 12/17/24 decreased to 674 and on 12/20/24 decreased to 448  Labs  drawn on 12/24/2024, repeat  trending down  Labs drawn 01/15, ANC 4.01  Labs drawn 01/08,   Labs drawn 01/15   01/22 Increase Cobenfy to 100/20 mg BID for psychosis  Continue to monitor for toleration. Currently reporting intermittent nausea and blurry vision when reading and xerostomia.   Abilify D/ferny  Continue Lexapro 20 mg daily for depression and anxiety  Robinul D/ferny on 01/13  Atropine D/ferny on 01/13  Continue Propanolol 10 mg BID for anxiety   Continue Melatonin 9 mg QHS for sleep  Continue Senna-Docusate sodium one 50 mg tablet QHS for constipation  Continue Zofran 4 mg tablet Q6H PRN for nausea   Start PRN mouth kote for xerostomia 1/18/25    Pt remains on dual antipsychotic Tx due to failure on monotherapy   .  S/p ECT treatments.  - ACT team referral was made for him living back with his aunt once MA funding comes through from the [facility] and sister will try to reapply      No associated orders from this encounter found during lookback period of 72 hours.

## 2025-01-27 LAB
ATRIAL RATE: 78 BPM
ATRIAL RATE: 84 BPM
BNP SERPL-MCNC: 19 PG/ML (ref 0–100)
CK SERPL-CCNC: 160 U/L (ref 39–308)
CRP SERPL QL: 10 MG/L
P AXIS: 45 DEGREES
P AXIS: 48 DEGREES
PR INTERVAL: 150 MS
PR INTERVAL: 154 MS
QRS AXIS: 41 DEGREES
QRS AXIS: 43 DEGREES
QRSD INTERVAL: 78 MS
QRSD INTERVAL: 94 MS
QT INTERVAL: 368 MS
QT INTERVAL: 374 MS
QTC INTERVAL: 426 MS
QTC INTERVAL: 435 MS
T WAVE AXIS: -19 DEGREES
T WAVE AXIS: 154 DEGREES
VENTRICULAR RATE: 78 BPM
VENTRICULAR RATE: 84 BPM

## 2025-01-27 PROCEDURE — 83880 ASSAY OF NATRIURETIC PEPTIDE: CPT | Performed by: PSYCHIATRY & NEUROLOGY

## 2025-01-27 PROCEDURE — 99232 SBSQ HOSP IP/OBS MODERATE 35: CPT | Performed by: PSYCHIATRY & NEUROLOGY

## 2025-01-27 PROCEDURE — 93010 ELECTROCARDIOGRAM REPORT: CPT | Performed by: INTERNAL MEDICINE

## 2025-01-27 PROCEDURE — 86140 C-REACTIVE PROTEIN: CPT | Performed by: PSYCHIATRY & NEUROLOGY

## 2025-01-27 PROCEDURE — 93005 ELECTROCARDIOGRAM TRACING: CPT

## 2025-01-27 PROCEDURE — 82550 ASSAY OF CK (CPK): CPT | Performed by: PSYCHIATRY & NEUROLOGY

## 2025-01-27 RX ADMIN — DOCUSATE SODIUM 100 MG: 100 CAPSULE, LIQUID FILLED ORAL at 17:13

## 2025-01-27 RX ADMIN — NICOTINE POLACRILEX 2 MG: 2 GUM, CHEWING ORAL at 12:50

## 2025-01-27 RX ADMIN — ONDANSETRON 4 MG: 4 TABLET, ORALLY DISINTEGRATING ORAL at 10:03

## 2025-01-27 RX ADMIN — LORATADINE 10 MG: 10 TABLET ORAL at 08:59

## 2025-01-27 RX ADMIN — XANOMELINE AND TROSPIUM CHLORIDE 1 CAPSULE: 20; 100 CAPSULE, COATED PELLETS ORAL at 09:02

## 2025-01-27 RX ADMIN — NICOTINE POLACRILEX 2 MG: 2 GUM, CHEWING ORAL at 21:04

## 2025-01-27 RX ADMIN — MELATONIN TAB 3 MG 9 MG: 3 TAB at 21:04

## 2025-01-27 RX ADMIN — PROPRANOLOL HYDROCHLORIDE 10 MG: 10 TABLET ORAL at 08:59

## 2025-01-27 RX ADMIN — MULTIPLE VITAMINS W/ MINERALS TAB 1 TABLET: TAB ORAL at 08:59

## 2025-01-27 RX ADMIN — SENNOSIDES AND DOCUSATE SODIUM 2 TABLET: 50; 8.6 TABLET ORAL at 21:04

## 2025-01-27 RX ADMIN — HALOPERIDOL 2.5 MG: 1 TABLET ORAL at 21:04

## 2025-01-27 RX ADMIN — ACETAMINOPHEN 650 MG: 325 TABLET, FILM COATED ORAL at 10:02

## 2025-01-27 RX ADMIN — CLOZAPINE 250 MG: 25 TABLET ORAL at 21:04

## 2025-01-27 RX ADMIN — PROPRANOLOL HYDROCHLORIDE 10 MG: 10 TABLET ORAL at 21:04

## 2025-01-27 RX ADMIN — HYDROCHLOROTHIAZIDE 12.5 MG: 12.5 TABLET ORAL at 08:59

## 2025-01-27 RX ADMIN — ESCITALOPRAM OXALATE 20 MG: 10 TABLET, FILM COATED ORAL at 08:59

## 2025-01-27 RX ADMIN — DOCUSATE SODIUM 100 MG: 100 CAPSULE, LIQUID FILLED ORAL at 08:59

## 2025-01-27 RX ADMIN — PANTOPRAZOLE SODIUM 20 MG: 20 TABLET, DELAYED RELEASE ORAL at 08:59

## 2025-01-27 RX ADMIN — NICOTINE POLACRILEX 2 MG: 2 GUM, CHEWING ORAL at 17:14

## 2025-01-27 NOTE — NURSING NOTE
Pt came to the nurses station and said he vomited x1. Pt also c/o his throat feeling achy. Pt received Tylenol 650mg for 6/10 pain score and Zofran for nausea. Will continue to monitor.

## 2025-01-27 NOTE — NURSING NOTE
"Pt is visible in the milieu and social with select peers. He consumed 100 % of dinner. Took his medications without incidence. Pt is polite, pleasant, and cooperative. Brightens on approach. Pt reports \"the same\" AH, depression and anxiety. Pt watched football in the Dining Room with peers. No unmet needs. Offered no concerns. No behavioral issues.   "

## 2025-01-27 NOTE — NURSING NOTE
Pt is pleasant, calm, visible on the unit. Pt ate 100% of dinner. Pt refused Cobenfy, says it's making him feel sick. Pt continue to reports AH, denies SI/HI/VH. Safety checks ongoing.

## 2025-01-27 NOTE — PLAN OF CARE
Problem: Alteration in Thoughts and Perception  Goal: Agree to be compliant with medication regime, as prescribed and report medication side effects  Description: Interventions:  - Offer appropriate PRN medication and supervise ingestion; conduct AIMS, as needed   Outcome: Progressing     Problem: Ineffective Coping  Goal: Identifies ineffective coping skills  Outcome: Progressing     Problem: Depression  Goal: Refrain from harming self  Description: Interventions:  - Monitor patient closely, per order   - Supervise medication ingestion, monitor effects and side effects   Outcome: Progressing  Goal: Refrain from isolation  Description: Interventions:  - Develop a trusting relationship   - Encourage socialization   Outcome: Progressing  Goal: Refrain from self-neglect  Outcome: Progressing     Problem: Alteration in Orientation  Goal: Interact with staff daily  Description: Interventions:  - Assess and re-assess patient's level of orientation  - Engage patient in 1 on 1 interactions, daily, for a minimum of 15 minutes   - Establish rapport/trust with patient   Outcome: Progressing  Goal: Express concerns related to confused thinking related to:  Description: Interventions:  - Encourage patient to express feelings, fears, frustrations, hopes  - Assign consistent caregivers   - [place]/re-orient patient as needed  - Allow comfort items, as appropriate  - Provide visual cues, signs, etc.   Outcome: Progressing  Goal: Allow medical examinations, as recommended  Description: Interventions:  - Provide physical/neurological exams and/or referrals, per provider   Outcome: Progressing

## 2025-01-27 NOTE — NURSING NOTE
Pt reports Zofran was effective pt feeling a little better, pt ate lunch and tolerated that well. Pt reports Tylenol was mildly effective, says throat still feels achy. Will continue to monitor.

## 2025-01-27 NOTE — ASSESSMENT & PLAN NOTE
Reviewed on 01/27/25  No significant changes still with same threatening voices as before not commanding and planning to have a virtual visit with his sisters  Continue medication as follows:  Continue Clozapine 250 mg QHS for psychosis - increased 12/11/24 01/21: ANC 3.95,   To consider further careful titration  CBC w/diff and CK every 2 weeks  CK was increased to 960 on 12/12 and oral hydration encouraged, CK improved to 905 on 12/13/24. Repeat on 12/17/24 decreased to 674 and on 12/20/24 decreased to 448  Labs  drawn on 12/24/2024, repeat  trending down  Labs drawn 01/15, ANC 4.01  Labs drawn 01/08,   Labs drawn 01/15   01/22 Increase Cobenfy to 100/20 mg BID for psychosis  Continue to monitor for toleration. Currently reporting intermittent nausea and blurry vision when reading and xerostomia.   Abilify D/ferny  Continue Lexapro 20 mg daily for depression and anxiety  Robinul D/ferny on 01/13  Atropine D/ferny on 01/13  Continue Propanolol 10 mg BID for anxiety   Continue Melatonin 9 mg QHS for sleep  Continue Senna-Docusate sodium one 50 mg tablet QHS for constipation  Continue Zofran 4 mg tablet Q6H PRN for nausea   Start PRN mouth kote for xerostomia 1/18/25    Pt remains on dual antipsychotic Tx due to failure on monotherapy   .  S/p ECT treatments.  - ACT team referral was made for him living back with his aunt once MA funding comes through from the [facility] and sister will try to reapply      No associated orders from this encounter found during lookback period of 72 hours.

## 2025-01-27 NOTE — PROGRESS NOTES
01/27/25 0837   Team Meeting   Meeting Type Daily Rounds   Team Members Present   Team Members Present Physician;Nurse;;Other (Discipline and Name)   Physician Team Member Abraham   Nursing Team Member Mohsen   Social Work Team Member Jose J ORTEGA   Other (Discipline and Name) Wang PCM   Patient/Family Present   Patient Present No   Patient's Family Present No     Groups Participation  4/10.   Patient's compliant with medications. He had decreased engagement in his treatment. He's depressed and reports AH. Awaiting [facility] funding for [facility] services to discharge home to his family.

## 2025-01-27 NOTE — ASSESSMENT & PLAN NOTE
Reviewed on 01/27/25  Follows with medical team  Continue senna-docusate sodium dose per medical team.   Last BM 1/17/25    No associated orders from this encounter found during lookback period of 72 hours.

## 2025-01-27 NOTE — PROGRESS NOTES
Progress Note - Behavioral Health   Name: Alberto Berumen 29 y.o. male I MRN: [id number]  Unit/Bed#: [facility] 112-02 I Date of Admission: 3/29/2024   Date of Service: 1/27/2025 I Hospital Day: 304     Assessment & Plan  Schizoaffective disorder, bipolar type (HCC)  Reviewed on 01/27/25  No significant changes still with same threatening voices as before not commanding and planning to have a virtual visit with his sisters  Continue medication as follows:  Continue Clozapine 250 mg QHS for psychosis - increased 12/11/24 01/21: ANC 3.95,   To consider further careful titration  CBC w/diff and CK every 2 weeks  CK was increased to 960 on 12/12 and oral hydration encouraged, CK improved to 905 on 12/13/24. Repeat on 12/17/24 decreased to 674 and on 12/20/24 decreased to 448  Labs  drawn on 12/24/2024, repeat  trending down  Labs drawn 01/15, ANC 4.01  Labs drawn 01/08,   Labs drawn 01/15   01/22 Increase Cobenfy to 100/20 mg BID for psychosis  Continue to monitor for toleration. Currently reporting intermittent nausea and blurry vision when reading and xerostomia.   Abilify D/ferny  Continue Lexapro 20 mg daily for depression and anxiety  Robinul D/ferny on 01/13  Atropine D/ferny on 01/13  Continue Propanolol 10 mg BID for anxiety   Continue Melatonin 9 mg QHS for sleep  Continue Senna-Docusate sodium one 50 mg tablet QHS for constipation  Continue Zofran 4 mg tablet Q6H PRN for nausea   Start PRN mouth kote for xerostomia 1/18/25    Pt remains on dual antipsychotic Tx due to failure on monotherapy   .  S/p ECT treatments.  - ACT team referral was made for him living back with his aunt once MA funding comes through from the [facility] and sister will try to reapply      No associated orders from this encounter found during lookback period of 72 hours.    Chronic idiopathic constipation  Reviewed on 01/27/25  Follows with medical team  Continue senna-docusate sodium dose per medical team.   Last  BM 1/17/25    No associated orders from this encounter found during lookback period of 72 hours.  GERD (gastroesophageal reflux disease)  Reviewed on 01/27/25  Follows with medical  Continue famotidine 20 mg tablet BID per medical team   Continue pantoprazole EC 20 mg tablet every morning per medical team  This is a chronic condition, and is stable unless otherwise noted.      No associated orders from this encounter found during lookback period of 72 hours.  Tobacco abuse  Reviewed on 01/27/25  on nicotine gum as needed and encouraged smoking cessation  Continue to encourage cessation upon discharge      No associated orders from this encounter found during lookback period of 72 hours.  Elevated hemoglobin A1c  Reviewed on 01/27/25  Follows with medical team    No associated orders from this encounter found during lookback period of 72 hours.  Class 2 obesity in adult  Reviewed on 01/27/25  Follows with medical team and given dietary counseling and need for exercise      No associated orders from this encounter found during lookback period of 72 hours.    Primary hypertension  Reviewed on 01/27/25  Follows with medical team on hydrochlorothiazide and Norvasc      No associated orders from this encounter found during lookback period of 72 hours.    Psychiatry Progress Note [facility]    Progress towards goals: moderate progress    Review of systems: denied    Psychiatric Diagnosis: Schizoaffective disorder, bipolar type     Assessment  Overall Status:  progressing, no significant changes   Certification Statement: The patient will continue to require additional inpatient hospital stay due to psychotic related symptoms and limited responses to medications and ECT.         Medications: as above  Side effects from treatment: nausea and blurred vision   Medication changes: as above  Medication education: Risks side effects benefits and precautions of medications discussed with patient and they did  "verbalize an understanding about risks for metabolic syndrome from being on neuroleptics and tardive dyskinesia etc.  All medications reviewed and I recommend that they be continued for symptom management  Understanding of medications: Risks side effects benefits and precautions of medications discussed with patient and they did verbalize an understanding about risks for metabolic syndrome from being on neuroleptics and tardive dyskinesia etc., and patient appeared to have understanding.  Justification for dual anti-psychotics: due to not responding to single antipsychotic     Non-pharmacological treatments  Continue with individual, group, milieu and occupational therapy using recovery principles and psycho-education about accepting illness and the need for treatment.  Behavioral health checks every 7 minutes    Safety  Safety and communication plan established to target dynamic risk factors discussed above.    Discharge Plan   To his Aunt with [facility] [place] for ACT when received     Interval Progress:   Per treatment team, Alberto continues to endorse auditory hallucinations that say they are going to kill him when he leaves here. He also continues to endorse mild depression to staff. His last shower was on the 26th. He attended 8/10 groups on Friday. He continues to experience side effects of cobenfy including dry mouth and blurred vision.    Today, Alberto reports that his mood is \"stable\". He said he was excited that the [employer] won last night since he is a fan. He said his plans for the day are to go to groups and work out. He denied SI/HI or passive thoughts of death. He endorsed continual auditory hallucinations of voices that say they are going to kill him when he leaves here, but he denied command hallucinations or they hallucinations involving his family. He said that over the weekend he experienced some nausea from the cobenfy and that he continues to have issues with blurred vision. Otherwise he reports " "his appetite and sleep as good.     Acceptance by patient: accepts inpatient treatment and medications   Hopefulness in recovery: to live with his aunt in the community   Involved in reintegration process: communicates with siblings and aunt  Trusting in relationship with psychiatrist: trusting  Sleep: good  Appetite: good  Compliance with Medications: compliant  Group attendance: 8/10 groups  Significant events: none in the last 24 hours    Mental Status Exam  Appearance: age appropriate, casually dressed, laying in bed asleep with a blanket wrapped around his body, adequate hygiene, hair wrapped in a protective cap   Behavior: calm, cooperative, moderate eye contact, polite, easily aroused from sleep  Speech: normal rate, normal volume, normal pitch, fluent, clear  Mood: \"stable\"  Affect: constricted, mood-congruent  Thought Process: organized, logical, coherent, goal directed, linear  Thought Content: some paranoia, intrusive thoughts, no overt delusions   Perceptual Disturbances:  continued auditory hallucinations of voices that say they are going to kill him when he leaves here. He denies command hallucinations or the hallucinations involving his family. Not observed responding to internal stimuli or appearing preoccupied.   Risk Potential: Suicidal Ideations none, Homicidal Ideations none, and Potential for Aggression No  Sensorium: alert and oriented to person, place, time and situation  Cognition: recent and remote memory grossly intact  Consciousness: alert and awake  Attention: attention span and concentration are age appropriate  Intellect: appears to be of average intelligence  Insight: intact  Judgement: intact  Motor Activity: no abnormal movements     Vitals  Temp:  [97.7 °F (36.5 °C)-97.9 °F (36.6 °C)] 97.7 °F (36.5 °C)  HR:  [77-92] 77  Resp:  [16-18] 17  BP: (114-123)/(64-77) 114/73  SpO2:  [100 %] 100 %  No intake or output data in the 24 hours ending 01/27/25 1007    Lab Results: All Labs For " Current Hospital Admission Reviewed  Results from last 7 days   Lab Units 01/21/25  0536   WBC Thousand/uL 8.93   RBC Million/uL 5.53   HEMOGLOBIN g/dL 12.8   HEMATOCRIT % 42.7   MCV fL 77*   PLATELETS Thousands/uL 244   TOTAL NEUT ABS Thousands/µL 3.95       Current Facility-Administered Medications   Medication Dose Route Frequency Provider Last Rate    acetaminophen  650 mg Oral Q4H PRN Jordan C Holter,       acetaminophen  650 mg Oral Q6H PRN HOLLI Lion      aluminum-magnesium hydroxide-simethicone  30 mL Oral Q4H PRN Jordan C Holter, DO      amLODIPine  5 mg Oral Daily HOLLI Lion      Artificial Tears  1 drop Both Eyes Q3H PRN Jordan C Holter, DO      haloperidol lactate  2.5 mg Intramuscular Q4H PRN Max 4/day HOLLI Lion      And    LORazepam  1 mg Intramuscular Q4H PRN Max 4/day HOLLI Lion      And    benztropine  0.5 mg Intramuscular Q4H PRN Max 4/day HOLLI Lion      haloperidol lactate  5 mg Intramuscular Q4H PRN Max 4/day HOLLI Lion      And    LORazepam  2 mg Intramuscular Q4H PRN Max 4/day HOLLI Lion      And    benztropine  1 mg Intramuscular Q4H PRN Max 4/day HOLLI Lion      bisacodyl  10 mg Rectal Daily PRN HOLLI Lion      calcium carbonate  500 mg Oral BID PRN HOLLI Monge      cloZAPine  250 mg Oral HS Bora Rosario MD      hydrOXYzine HCL  50 mg Oral Q6H PRN Max 4/day Jordan C Holter, DO      Or    diphenhydrAMINE  50 mg Intramuscular Q6H PRN Jordan C Holter, DO      hydrOXYzine HCL  50 mg Oral Q6H PRN Max 4/day HOLLI Lion      Or    diphenhydrAMINE  50 mg Intramuscular Q6H PRN HOLLI Lion      diphenhydrAMINE-zinc acetate   Topical BID PRN HOLLI Lion      docusate sodium  100 mg Oral BID Jourdan Dickens,       escitalopram  20 mg Oral Daily HOLLI Lion      famotidine  20 mg Oral BID PRN HOLLI Monge      fluticasone  1 spray Each Nare Daily PRN Eileen Holliday  HOLLI Jensen      haloperidol  1 mg Oral Q6H PRN HOLLI Lion      haloperidol  2.5 mg Oral Q4H PRN Max 4/day HOLLI Lion      haloperidol  5 mg Oral Q4H PRN Max 4/day HOLLI Lion      hydroCHLOROthiazide  12.5 mg Oral Daily HOLLI Galvan      hydrocortisone   Topical 4x Daily PRN HOLLI Lion      hydrOXYzine HCL  100 mg Oral Q6H PRN Max 4/day [facility] Holter, DO      Or    LORazepam  2 mg Intramuscular Q6H PRN [facility] Holter, DO      hydrOXYzine HCL  100 mg Oral Q6H PRN Max 4/day HOLLI Lion      Or    LORazepam  2 mg Intramuscular Q6H PRN HOLLI Lion      hydrOXYzine HCL  25 mg Oral Q6H PRN Max 4/day [facility] Holter, DO      ibuprofen  600 mg Oral Q8H PRN HOLLI Lion      influenza vaccine  0.5 mL Intramuscular Once Bora Rosario MD      loratadine  10 mg Oral Daily Brina Guillen MD      magnesium hydroxide  30 mL Oral Once Jourdan Dickens,       melatonin  3 mg Oral HS PRN Bora Rosario MD      melatonin  9 mg Oral HS Bora Rosario MD      methocarbamol  500 mg Oral Q6H PRN HOLLI Lion      multivitamin-minerals  1 tablet Oral Daily Eileen CherryHOLLI Jimenez      nicotine polacrilex  2 mg Oral Q4H PRN Bora Rosario MD      OLANZapine  5 mg Oral Q4H PRN Max 3/day [facility] Holter, DO      Or    OLANZapine  2.5 mg Intramuscular Q4H PRN Max 3/day [facility] Holter, DO      OLANZapine  5 mg Oral Q3H PRN Max 3/day [facility] Holter, DO      Or    OLANZapine  5 mg Intramuscular Q3H PRN Max 3/day [facility] Holter, DO      OLANZapine  2.5 mg Oral Q4H PRN Max 6/day [facility] Holter, DO      ondansetron  4 mg Oral Q6H PRN HOLLI Lion      [facility]antoprazole  [facility]      polyethylene glycol  17 g Oral Daily PRN HOLLI Ghotra      polyethylene glycol  17 g Oral Daily Jourdan Dickens, DO      propranolol  10 mg Oral Q12H KAYLIE Eveline Hangey, CRNP      saliva substitute  5 spray Mouth/Throat 4x Daily PRN Mary Jo Reich PA-C       senna-docusate sodium  1 tablet Oral Daily PRN Jordan C Holter,       senna-docusate sodium  2 tablet Oral HS Jourdan Dickens,       traZODone  50 mg Oral HS PRN HOLLI Lion      white petrolatum-mineral oil   Topical TID PRN HOLLI Lion      Xanomeline-Trospium Chloride  1 capsule Oral BID Bora Rosario MD         Counseling / Coordination of Care: Total floor / unit time spent today 15 minutes. Greater than 50% of total time was spent with the patient and / or family counseling and / or somewhat receptive to supportive listening and teaching positive coping skills to deal with symptom mangement.     Patient's Rights, confidentiality and exceptions to confidentiality, use of automated medical record, Behavioral Health Services staff access to medical record, and consent to treatment reviewed.    This note has been dictated and hence there may be problems with punctuation, spelling and formatting and if anyone has any concerns please address them to Dr. Rosario.   This note is not shared with patient due to potential for making patient's condition worse by knowing the content of the note.    Written by Leidy WALLACE and supervised by Dr. Rosario

## 2025-01-27 NOTE — ASSESSMENT & PLAN NOTE
Reviewed on 01/27/25  Follows with medical team    No associated orders from this encounter found during lookback period of 72 hours.

## 2025-01-27 NOTE — ASSESSMENT & PLAN NOTE
Reviewed on 01/27/25  Follows with medical  Continue famotidine 20 mg tablet BID per medical team   Continue pantoprazole EC 20 mg tablet every morning per medical team  This is a chronic condition, and is stable unless otherwise noted.      No associated orders from this encounter found during lookback period of 72 hours.

## 2025-01-27 NOTE — ASSESSMENT & PLAN NOTE
Reviewed on 01/27/25  on nicotine gum as needed and encouraged smoking cessation  Continue to encourage cessation upon discharge      No associated orders from this encounter found during lookback period of 72 hours.

## 2025-01-27 NOTE — ASSESSMENT & PLAN NOTE
Reviewed on 01/27/25  Follows with medical team and given dietary counseling and need for exercise      No associated orders from this encounter found during lookback period of 72 hours.

## 2025-01-27 NOTE — ASSESSMENT & PLAN NOTE
Reviewed on 01/27/25  Follows with medical team on hydrochlorothiazide and Norvasc      No associated orders from this encounter found during lookback period of 72 hours.

## 2025-01-28 PROCEDURE — 99232 SBSQ HOSP IP/OBS MODERATE 35: CPT | Performed by: PSYCHIATRY & NEUROLOGY

## 2025-01-28 RX ORDER — HALOPERIDOL 1 MG/1
2 TABLET ORAL 2 TIMES DAILY
Status: DISCONTINUED | OUTPATIENT
Start: 2025-01-28 | End: 2025-01-30

## 2025-01-28 RX ADMIN — NICOTINE POLACRILEX 2 MG: 2 GUM, CHEWING ORAL at 17:24

## 2025-01-28 RX ADMIN — SENNOSIDES AND DOCUSATE SODIUM 2 TABLET: 50; 8.6 TABLET ORAL at 21:12

## 2025-01-28 RX ADMIN — MULTIPLE VITAMINS W/ MINERALS TAB 1 TABLET: TAB ORAL at 09:05

## 2025-01-28 RX ADMIN — DOCUSATE SODIUM 100 MG: 100 CAPSULE, LIQUID FILLED ORAL at 17:24

## 2025-01-28 RX ADMIN — DOCUSATE SODIUM 100 MG: 100 CAPSULE, LIQUID FILLED ORAL at 09:06

## 2025-01-28 RX ADMIN — ESCITALOPRAM OXALATE 20 MG: 10 TABLET, FILM COATED ORAL at 09:05

## 2025-01-28 RX ADMIN — NICOTINE POLACRILEX 2 MG: 2 GUM, CHEWING ORAL at 09:05

## 2025-01-28 RX ADMIN — NICOTINE POLACRILEX 2 MG: 2 GUM, CHEWING ORAL at 21:11

## 2025-01-28 RX ADMIN — MELATONIN TAB 3 MG 9 MG: 3 TAB at 21:13

## 2025-01-28 RX ADMIN — HALOPERIDOL 2 MG: 1 TABLET ORAL at 17:24

## 2025-01-28 RX ADMIN — CLOZAPINE 250 MG: 25 TABLET ORAL at 21:12

## 2025-01-28 RX ADMIN — PROPRANOLOL HYDROCHLORIDE 10 MG: 10 TABLET ORAL at 21:11

## 2025-01-28 RX ADMIN — HALOPERIDOL 2 MG: 1 TABLET ORAL at 12:29

## 2025-01-28 RX ADMIN — LORATADINE 10 MG: 10 TABLET ORAL at 09:06

## 2025-01-28 RX ADMIN — NICOTINE POLACRILEX 2 MG: 2 GUM, CHEWING ORAL at 12:29

## 2025-01-28 RX ADMIN — PANTOPRAZOLE SODIUM 20 MG: 20 TABLET, DELAYED RELEASE ORAL at 09:06

## 2025-01-28 NOTE — ASSESSMENT & PLAN NOTE
Reviewed on 01/28/25  Follows with medical team  Continue senna-docusate sodium dose per medical team.   Last BM 1/17/25    No associated orders from this encounter found during lookback period of 72 hours.

## 2025-01-28 NOTE — PLAN OF CARE
Problem: Alteration in Thoughts and Perception  Goal: Refrain from acting on delusional thinking/internal stimuli  Description: Interventions:  - Monitor patient closely, per order   - Utilize least restrictive measures   - Set reasonable limits, give positive feedback for acceptable   - Administer medications as ordered and monitor of potential side effects  Outcome: Progressing  Goal: Agree to be compliant with medication regime, as prescribed and report medication side effects  Description: Interventions:  - Offer appropriate PRN medication and supervise ingestion; conduct AIMS, as needed   Outcome: Progressing     Problem: Ineffective Coping  Goal: Participates in unit activities  Description: Interventions:  - Provide therapeutic environment   - Provide required programming   - Redirect inappropriate behaviors   Outcome: Progressing  Goal: Patient/Family participate in treatment and DC plans  Description: Interventions:  - Provide therapeutic environment  Outcome: Progressing  Goal: Patient/Family verbalizes awareness of resources  Outcome: Progressing     Problem: Depression  Goal: Treatment Goal: Demonstrate behavioral control of depressive symptoms, verbalize feelings of improved mood/affect, and adopt new coping skills prior to discharge  Outcome: Progressing  Goal: Verbalize thoughts and feelings  Description: Interventions:  - Assess and re-assess patient's level of risk   - Engage patient in 1:1 interactions, daily, for a minimum of 15 minutes   - Encourage patient to express feelings, fears, frustrations, hopes   Outcome: Progressing  Goal: Refrain from harming self  Description: Interventions:  - Monitor patient closely, per order   - Supervise medication ingestion, monitor effects and side effects   Outcome: Progressing  Goal: Refrain from isolation  Description: Interventions:  - Develop a trusting relationship   - Encourage socialization   Outcome: Progressing  Goal: Refrain from  self-neglect  Outcome: Progressing     Problem: Anxiety  Goal: Anxiety is at manageable level  Description: Interventions:  - Assess and monitor patient's anxiety level.   - Monitor for signs and symptoms (heart palpitations, chest pain, shortness of breath, headaches, nausea, feeling jumpy, restlessness, irritable, apprehensive).   - Collaborate with interdisciplinary team and initiate plan and interventions as ordered.  - [place] patient to unit/surroundings  - Explain treatment plan  - Encourage participation in care  - Encourage verbalization of concerns/fears  - Identify coping mechanisms  - Assist in developing anxiety-reducing skills  - Administer/offer alternative therapies  - Limit or eliminate stimulants  Outcome: Progressing     Problem: Alteration in Orientation  Goal: Treatment Goal: Demonstrate a reduction of confusion and improved orientation to person, place, time and/or situation upon discharge, according to optimum baseline/ability  Outcome: Progressing  Goal: Interact with staff daily  Description: Interventions:  - Assess and re-assess patient's level of orientation  - Engage patient in 1 on 1 interactions, daily, for a minimum of 15 minutes   - Establish rapport/trust with patient   Outcome: Progressing  Goal: Allow medical examinations, as recommended  Description: Interventions:  - Provide physical/neurological exams and/or referrals, per provider   Outcome: Progressing  Goal: Cooperate with recommended testing/procedures  Description: Interventions:  - Determine need for ancillary testing  - Observe for mental status changes  - Implement falls/precaution protocol   Outcome: Progressing  Goal: Complete daily ADLs, including personal hygiene independently, as able  Description: Interventions:  - Observe, teach, and assist patient with ADLS  Outcome: Progressing     Problem: DISCHARGE PLANNING  Goal: Discharge to home with appropriate resources  Description: INTERVENTIONS:  - Identify barriers to  discharge w/patient and caregiver  - Arrange for needed discharge resources and transportation as appropriate  - Identify discharge learning needs (meds, wound care, etc.)  - Refer to Case Management Department for coordinating discharge planning if the patient needs post-hospital services based on physician/advanced practitioner order or complex needs related to functional status, cognitive ability, or social support system  Outcome: Progressing     Problem: Alteration in Thoughts and Perception  Goal: Treatment Goal: Gain control of psychotic behaviors/thinking, reduce/eliminate presenting symptoms and demonstrate improved reality functioning upon discharge  Outcome: Not Progressing  Goal: Recognize dysfunctional thoughts, communicate reality-based thoughts at the time of discharge  Description: Interventions:  - Provide medication and psycho-education to assist patient in compliance and developing insight into his/her illness   Outcome: Not Progressing     Problem: Alteration in Orientation  Goal: Express concerns related to confused thinking related to:  Description: Interventions:  - Encourage patient to express feelings, fears, frustrations, hopes  - Assign consistent caregivers   - [place]/re-orient patient as needed  - Allow comfort items, as appropriate  - Provide visual cues, signs, etc.   Outcome: Progressing     Problem: Alteration in Thoughts and Perception  Goal: Treatment Goal: Gain control of psychotic behaviors/thinking, reduce/eliminate presenting symptoms and demonstrate improved reality functioning upon discharge  1/28/2025 0333 by Mireya Pearson RN  Outcome: Not Progressing  1/28/2025 0332 by Mireya Pearson RN  Outcome: Not Progressing  Goal: Refrain from acting on delusional thinking/internal stimuli  Description: Interventions:  - Monitor patient closely, per order   - Utilize least restrictive measures   - Set reasonable limits, give positive feedback for acceptable   - Administer  medications as ordered and monitor of potential side effects  1/28/2025 0333 by Mireya Pearson RN  Outcome: Not Progressing  1/28/2025 0332 by Mireya Pearson RN  Outcome: Progressing

## 2025-01-28 NOTE — NURSING NOTE
PRN Haldol per request for increase voices, that threaten to cause him harm to him and his family.   Haldol 2.5mg PO rendered for broset #3

## 2025-01-28 NOTE — ASSESSMENT & PLAN NOTE
Reviewed on 01/28/25  on nicotine gum as needed and encouraged smoking cessation  Continue to encourage cessation upon discharge      No associated orders from this encounter found during lookback period of 72 hours.

## 2025-01-28 NOTE — ASSESSMENT & PLAN NOTE
Reviewed on 01/28/25  Follows with medical  Continue famotidine 20 mg tablet BID per medical team   Continue pantoprazole EC 20 mg tablet every morning per medical team  This is a chronic condition, and is stable unless otherwise noted.      No associated orders from this encounter found during lookback period of 72 hours.

## 2025-01-28 NOTE — PROGRESS NOTES
01/28/25 0856   Team Meeting   Meeting Type Daily Rounds   Team Members Present   Team Members Present Physician;Nurse;;Other (Discipline and Name)   Physician Team Member Thomas, Holter   Nursing Team Member Claudia   Social Work Team Member Jose J ORTEGA   Other (Discipline and Name) Jeremias Grace   Patient/Family Present   Patient Present No   Patient's Family Present No     Groups Participation  /.   Cobenfy discontinued. He reports increased AH, Haldol PRN 2.5MG. He's social and compliant with medications. He's engaged in his treatment.

## 2025-01-28 NOTE — NURSING NOTE
Patient is isolative most of the day, only out for breakfast. Pt later more visible in afternoon. Pt is pleasant, cooperative and voices no concerns. Pt takes all medications without issue.

## 2025-01-28 NOTE — NURSING NOTE
Maintained on ongoing SAFE precaution without incident on this shift. Observed resting in bed, respiration even and unlabored. Continuous Q 15 minutes check implemented thru the night. No behavioral noted

## 2025-01-28 NOTE — NURSING NOTE
"Post Haldol 2.5mg for agitation has reduce the voices that he is hearing.  He stated that \"I feel a little bit better. No further issue  "

## 2025-01-28 NOTE — ASSESSMENT & PLAN NOTE
Reviewed on 01/28/25  Follows with medical team on hydrochlorothiazide and Norvasc      No associated orders from this encounter found during lookback period of 72 hours.

## 2025-01-28 NOTE — ASSESSMENT & PLAN NOTE
Reviewed on 01/28/25  Follows with medical team and given dietary counseling and need for exercise      No associated orders from this encounter found during lookback period of 72 hours.

## 2025-01-28 NOTE — PLAN OF CARE
Problem: Ineffective Coping  Goal: Identifies ineffective coping skills  Outcome: Progressing  Goal: Identifies healthy coping skills  Outcome: Progressing  Goal: Demonstrates healthy coping skills  Outcome: Progressing  Goal: Participates in unit activities  Description: Interventions:  - Provide therapeutic environment   - Provide required programming   - Redirect inappropriate behaviors   Outcome: Progressing   He continues to work toward the goal by attending and participating in the groups.

## 2025-01-28 NOTE — ASSESSMENT & PLAN NOTE
Reviewed on 01/28/25  Follows with medical team    No associated orders from this encounter found during lookback period of 72 hours.

## 2025-01-28 NOTE — PROGRESS NOTES
Progress Note - Behavioral Health   Name: Alberto Berumen 29 y.o. male I MRN: [id number]  Unit/Bed#: [facility] 112-02 I Date of Admission: 3/29/2024   Date of Service: 1/28/2025 I Hospital Day: 305     Assessment & Plan  Schizoaffective disorder, bipolar type (HCC)  Reviewed on 01/28/25  Continue medication as follows:  Continue Clozapine 250 mg QHS for psychosis - increased 12/11/24 01/28: , BNP 19, CRP 10  01/21: ANC 3.95,   To consider further careful titration  CBC w/diff and CK every 2 weeks  CK was increased to 960 on 12/12 and oral hydration encouraged, CK improved to 905 on 12/13/24. Repeat on 12/17/24 decreased to 674 and on 12/20/24 decreased to 448  Labs  drawn on 12/24/2024, repeat  trending down  Labs drawn 01/15, ANC 4.01  Labs drawn 01/08,   Labs drawn 01/15   01/28 Start haldol 2 mg BID for psychosis   01/28 Discontinue Cobenfy  Abilify D/ferny  Continue Lexapro 20 mg daily for depression and anxiety  Robinul D/ferny on 01/13  Atropine D/ferny on 01/13  Continue Propanolol 10 mg BID for anxiety   Continue Melatonin 9 mg QHS for sleep  Continue Senna-Docusate sodium one 50 mg tablet QHS for constipation  Continue Zofran 4 mg tablet Q6H PRN for nausea   Continue PRN mouth kote for xerostomia     Pt remains on dual antipsychotic Tx due to failure on monotherapy   .  S/p ECT treatments.  - ACT team referral was made for him living back with his aunt once MA funding comes through from the [facility] and sister will try to reapply      No associated orders from this encounter found during lookback period of 72 hours.    Chronic idiopathic constipation  Reviewed on 01/28/25  Follows with medical team  Continue senna-docusate sodium dose per medical team.   Last BM 1/17/25    No associated orders from this encounter found during lookback period of 72 hours.  GERD (gastroesophageal reflux disease)  Reviewed on 01/28/25  Follows with medical  Continue famotidine 20 mg tablet BID  per medical team   Continue pantoprazole EC 20 mg tablet every morning per medical team  This is a chronic condition, and is stable unless otherwise noted.      No associated orders from this encounter found during lookback period of 72 hours.  Tobacco abuse  Reviewed on 01/28/25  on nicotine gum as needed and encouraged smoking cessation  Continue to encourage cessation upon discharge      No associated orders from this encounter found during lookback period of 72 hours.  Elevated hemoglobin A1c  Reviewed on 01/28/25  Follows with medical team    No associated orders from this encounter found during lookback period of 72 hours.  Class 2 obesity in adult  Reviewed on 01/28/25  Follows with medical team and given dietary counseling and need for exercise      No associated orders from this encounter found during lookback period of 72 hours.    Primary hypertension  Reviewed on 01/28/25  Follows with medical team on hydrochlorothiazide and Norvasc      No associated orders from this encounter found during lookback period of 72 hours.    Psychiatry Progress Note [facility]    Progress towards goals: moderate progress    Review of systems: denied    Psychiatric Diagnosis: Schizoaffective disorder, bipolar type     Assessment  Overall Status:  progressing, no significant changes   Certification Statement: The patient will continue to require additional inpatient hospital stay due to psychotic related symptoms and limited responses to medications and ECT        Medications: as above  Side effects from treatment: nausea and blurred vision   Medication changes: as above  Medication education: Risks side effects benefits and precautions of medications discussed with patient and they did verbalize an understanding about risks for metabolic syndrome from being on neuroleptics and tardive dyskinesia etc.  All medications reviewed and I recommend that they be continued for symptom management  Understanding  "of medications: Risks side effects benefits and precautions of medications discussed with patient and they did verbalize an understanding about risks for metabolic syndrome from being on neuroleptics and tardive dyskinesia etc., and patient appeared to have understanding.  Justification for dual anti-psychotics: due to not responding to single antipsychotic    Non-pharmacological treatments  Continue with individual, group, milieu and occupational therapy using recovery principles and psycho-education about accepting illness and the need for treatment.  Behavioral health checks every 7 minutes    Safety  Safety and communication plan established to target dynamic risk factors discussed above.    Discharge Plan   To his Aunt with [facility] for ACT when received     Interval Progress:   Per treatment team, Alberto refused to take his cobenfy yesterday due to the side effects he has been experiencing. He had an EKG done yesterday and it was within normal limits. Yesterday at 1002 he complained of a sore throat and was given tylenol which was effective. Shortly after taking the tylenol he threw up bile which was attributed to taking the medication on an empty stomach. He ate 100% of his lunch and dinner but did not eat his breakfast. Yesterday evening he was out and visible on the unit being social with peers when a look suddenly crossed over his face and he said the voices started to really bother him at that moment. He was given haldol at 2104 which was effective. He attended 6/9 groups yesterday.     Today, Alberto reports that his mood is \"stable\". He said his goals for today are to read his bible and work out. He said that his day was good yesterday. He reported continual auditory hallucinations that say they are going to kill him when he leaves here but denies command hallucinations. He denies SI/HI or passive wishes of death. He said his appetite and sleep are stable. He said that he felt \"a little happy\" " "that the cobenfy was going to be discontinued and that he hoped that means his nausea and blurred vision will resolve.     Acceptance by patient: accepts inpatient treatment and medications   Hopefulness in recovery: to live with his aunt in the community   Involved in reintegration process: communicates with siblings and aunt   Trusting in relationship with psychiatrist: trusting   Sleep: good  Appetite: good  Compliance with Medications: compliant  Group attendance: 6/9 groups   Significant events: received haldol at 2104 for voices significantly bothering him     Mental Status Exam  Appearance: age appropriate, casually dressed, laying in bed asleep with a blanket wrapped around his body, adequate hygiene, hair wrapped in a protective cap   Behavior: calm, cooperative, moderate eye contact, polite, easily aroused from sleep   Speech: normal rate, normal volume, normal pitch, clear, coherent   Mood: \"stable\"  Affect: constricted, mood-congruent  Thought Process: organized, logical, coherent, goal directed, linear  Thought Content: some paranoia, intrusive thoughts, no overt delusions   Perceptual Disturbances:  continued auditory hallucinations of voices that say they are going to kill him when he leaves here. He denies command hallucinations or the hallucinations involving his family. Not appearing preoccupied or responding to internal stimuli.  Risk Potential: Suicidal Ideations none, Homicidal Ideations none, and Potential for Aggression No  Sensorium: alert and oriented to person, place, time and situation  Cognition: recent and remote memory grossly intact  Consciousness: alert and awake  Attention: attention span and concentration are age appropriate  Intellect: appears to be of average intelligence  Insight: intact  Judgement: intact  Motor Activity: no abnormal movements     Vitals  Temp:  [96.9 °F (36.1 °C)-98 °F (36.7 °C)] 96.9 °F (36.1 °C)  HR:  [74-95] 74  Resp:  [16-18] 16  BP: ()/(60-91) " 95/60  SpO2:  [98 %-100 %] 100 %  No intake or output data in the 24 hours ending 01/28/25 1010    Lab Results: All Labs For Current Hospital Admission Reviewed        Current Facility-Administered Medications   Medication Dose Route Frequency Provider Last Rate    acetaminophen  650 mg Oral Q4H PRN Jordan C Holter,       acetaminophen  650 mg Oral Q6H PRN HOLLI Lion      aluminum-magnesium hydroxide-simethicone  30 mL Oral Q4H PRN Jordan C Holter, DO      amLODIPine  5 mg Oral Daily HOLLI Lion      Artificial Tears  1 drop Both Eyes Q3H PRN Jordan C Holter, DO      haloperidol lactate  2.5 mg Intramuscular Q4H PRN Max 4/day HOLLI Lion      And    LORazepam  1 mg Intramuscular Q4H PRN Max 4/day HOLLI Lion      And    benztropine  0.5 mg Intramuscular Q4H PRN Max 4/day HOLLI Lion      haloperidol lactate  5 mg Intramuscular Q4H PRN Max 4/day HOLLI Lion      And    LORazepam  2 mg Intramuscular Q4H PRN Max 4/day HOLLI Lion      And    benztropine  1 mg Intramuscular Q4H PRN Max 4/day HOLLI Lion      bisacodyl  10 mg Rectal Daily PRN HOLLI Lion      calcium carbonate  500 mg Oral BID PRN HOLLI Monge      cloZAPine  250 mg Oral HS Bora Rosario MD      hydrOXYzine HCL  50 mg Oral Q6H PRN Max 4/day Jordan C Holter, DO      Or    diphenhydrAMINE  50 mg Intramuscular Q6H PRN Jordan C Holter, DO      hydrOXYzine HCL  50 mg Oral Q6H PRN Max 4/day HOLLI Lion      Or    diphenhydrAMINE  50 mg Intramuscular Q6H PRN HOLLI Lion      diphenhydrAMINE-zinc acetate   Topical BID PRN HOLLI Lion      docusate sodium  100 mg Oral BID Jourdan Dickens DO      escitalopram  20 mg Oral Daily HOLLI Lion      famotidine  20 mg Oral BID PRN HOLLI Monge      fluticasone  1 spray Each Nare Daily PRN HOLLI Monge      haloperidol  1 mg Oral Q6H PRN HOLLI Lion      haloperidol  2 mg  Oral BID Bora Rosario MD      haloperidol  2.5 mg Oral Q4H PRN Max 4/day HOLLI Lion      haloperidol  5 mg Oral Q4H PRN Max 4/day HOLLI Lion      hydroCHLOROthiazide  12.5 mg Oral Daily Pia Mantilla, HOLLI      hydrocortisone   Topical 4x Daily PRN HOLLI Lion      hydrOXYzine HCL  100 mg Oral Q6H PRN Max 4/day [facility] [address] Holter, DO      Or    LORazepam  2 mg Intramuscular Q6H PRN [facility] [address] Holter, DO      hydrOXYzine HCL  100 mg Oral Q6H PRN Max 4/day HOLLI Lion      Or    LORazepam  2 mg Intramuscular Q6H PRN HOLLI Lion      hydrOXYzine HCL  25 mg Oral Q6H PRN Max 4/day [facility] [address] Holter, DO      ibuprofen  600 mg Oral Q8H PRN HOLLI Lion      influenza vaccine  0.5 mL Intramuscular Once Bora Rosario MD      loratadine  10 mg Oral Daily Brina Guillen MD      magnesium hydroxide  30 mL Oral Once Jourdan Dickens, DO      melatonin  3 mg Oral HS PRN Bora Rosario MD      melatonin  9 mg Oral HS Bora Rosario MD      methocarbamol  500 mg Oral Q6H PRN HOLLI Lion      multivitamin-minerals  1 tablet Oral Daily HOLLI Monge      nicotine polacrilex  2 mg Oral Q4H PRN Bora Rosario MD      OLANZapine  5 mg Oral Q4H PRN Max 3/day [facility] [address] Holter, DO      Or    OLANZapine  2.5 mg Intramuscular Q4H PRN Max 3/day [facility] [address] Holter, DO      OLANZapine  5 mg Oral Q3H PRN Max 3/day [facility] [address] Holter, DO      Or    OLANZapine  5 mg Intramuscular Q3H PRN Max 3/day [facility] [address] Holter, DO      OLANZapine  2.5 mg Oral Q4H PRN Max 6/day [facility] [address] Holter, DO      ondansetron  4 mg Oral Q6H PRN HOLLI Lion      pantoprazole  20 mg Oral QAM [facility] [address] Holter, DO      polyethylene glycol  17 g Oral Daily PRN HOLLI Ghotra      polyethylene glycol  17 g Oral Daily Jourdan Dickens, DO      propranolol  10 mg Oral Q12H KAYLIE HOLLI Lion      saliva substitute  5 spray Mouth/Throat 4x Daily PRN JOSÉ MIGUEL Coronel-docusate sodium  1 tablet Oral  Daily PRN Jordan C Holter,       senna-docusate sodium  2 tablet Oral HS Jourdan Dickens,       traZODone  50 mg Oral HS PRN HOLLI Lion      white petrolatum-mineral oil   Topical TID PRN HOLLI Lion         Counseling / Coordination of Care: Total floor / unit time spent today 15 minutes. Greater than 50% of total time was spent with the patient and / or family counseling and / or somewhat receptive to supportive listening and teaching positive coping skills to deal with symptom mangement.     Patient's Rights, confidentiality and exceptions to confidentiality, use of automated medical record, Behavioral Health Services staff access to medical record, and consent to treatment reviewed.    This note has been dictated and hence there may be problems with punctuation, spelling and formatting and if anyone has any concerns please address them to Dr. Rosario.   This note is not shared with patient due to potential for making patient's condition worse by knowing the content of the note.    Written by Leidy WALLACE and supervised by Dr. Rosario

## 2025-01-29 PROCEDURE — 99232 SBSQ HOSP IP/OBS MODERATE 35: CPT | Performed by: PSYCHIATRY & NEUROLOGY

## 2025-01-29 RX ADMIN — MULTIPLE VITAMINS W/ MINERALS TAB 1 TABLET: TAB ORAL at 08:47

## 2025-01-29 RX ADMIN — PROPRANOLOL HYDROCHLORIDE 10 MG: 10 TABLET ORAL at 08:47

## 2025-01-29 RX ADMIN — PANTOPRAZOLE SODIUM 20 MG: 20 TABLET, DELAYED RELEASE ORAL at 08:47

## 2025-01-29 RX ADMIN — LORATADINE 10 MG: 10 TABLET ORAL at 08:46

## 2025-01-29 RX ADMIN — NICOTINE POLACRILEX 2 MG: 2 GUM, CHEWING ORAL at 17:11

## 2025-01-29 RX ADMIN — DOCUSATE SODIUM 100 MG: 100 CAPSULE, LIQUID FILLED ORAL at 08:46

## 2025-01-29 RX ADMIN — NICOTINE POLACRILEX 2 MG: 2 GUM, CHEWING ORAL at 12:59

## 2025-01-29 RX ADMIN — SENNOSIDES AND DOCUSATE SODIUM 2 TABLET: 50; 8.6 TABLET ORAL at 21:04

## 2025-01-29 RX ADMIN — NICOTINE POLACRILEX 2 MG: 2 GUM, CHEWING ORAL at 08:45

## 2025-01-29 RX ADMIN — AMLODIPINE BESYLATE 5 MG: 5 TABLET ORAL at 08:45

## 2025-01-29 RX ADMIN — HYDROCHLOROTHIAZIDE 12.5 MG: 12.5 TABLET ORAL at 08:46

## 2025-01-29 RX ADMIN — CLOZAPINE 250 MG: 25 TABLET ORAL at 21:05

## 2025-01-29 RX ADMIN — MELATONIN TAB 3 MG 9 MG: 3 TAB at 21:05

## 2025-01-29 RX ADMIN — HALOPERIDOL 2 MG: 1 TABLET ORAL at 17:11

## 2025-01-29 RX ADMIN — NICOTINE POLACRILEX 2 MG: 2 GUM, CHEWING ORAL at 21:05

## 2025-01-29 RX ADMIN — PROPRANOLOL HYDROCHLORIDE 10 MG: 10 TABLET ORAL at 21:04

## 2025-01-29 RX ADMIN — ESCITALOPRAM OXALATE 20 MG: 10 TABLET, FILM COATED ORAL at 08:47

## 2025-01-29 RX ADMIN — DOCUSATE SODIUM 100 MG: 100 CAPSULE, LIQUID FILLED ORAL at 17:10

## 2025-01-29 RX ADMIN — HALOPERIDOL 2 MG: 1 TABLET ORAL at 08:46

## 2025-01-29 NOTE — ASSESSMENT & PLAN NOTE
Reviewed on 01/29/25  Follows with medical team  Continue senna-docusate sodium dose per medical team.   Last BM 1/17/25    No associated orders from this encounter found during lookback period of 72 hours.

## 2025-01-29 NOTE — PROGRESS NOTES
Progress Note - Behavioral Health   Name: Alberto Berumen 29 y.o. male I MRN: [id number]  Unit/Bed#: [facility] 112-02 I Date of Admission: 3/29/2024   Date of Service: 1/29/2025 I Hospital Day: 306     Assessment & Plan  Schizoaffective disorder, bipolar type (HCC)  Reviewed on 01/29/25  No significant changes. Still with same auditory hallucinations stating they are going to kill him when he leaves here. Denies command hallucinations    Continue medication as follows:  Continue Clozapine 250 mg QHS for psychosis - increased 12/11/24 01/28: , BNP 19, CRP 10  01/21: ANC 3.95,   To consider further careful titration  CBC w/diff and CK every 2 weeks  CK was increased to 960 on 12/12 and oral hydration encouraged, CK improved to 905 on 12/13/24. Repeat on 12/17/24 decreased to 674 and on 12/20/24 decreased to 448  Labs  drawn on 12/24/2024, repeat  trending down  Labs drawn 01/15, ANC 4.01  Labs drawn 01/08,   Labs drawn 01/15   01/28 Start haldol 2 mg BID for psychosis   01/28 Discontinue Cobenfy  Abilify D/ferny  Continue Lexapro 20 mg daily for depression and anxiety  Robinul D/ferny on 01/13  Atropine D/ferny on 01/13  Continue Propanolol 10 mg BID for anxiety   Continue Melatonin 9 mg QHS for sleep  Continue Senna-Docusate sodium one 50 mg tablet QHS for constipation  Continue Zofran 4 mg tablet Q6H PRN for nausea   Continue PRN mouth kote for xerostomia     Pt remains on dual antipsychotic Tx due to failure on monotherapy   .  S/p ECT treatments.  - ACT team referral was made for him living back with his aunt once MA funding comes through from the [facility] and sister will try to reapply      No associated orders from this encounter found during lookback period of 72 hours.    Chronic idiopathic constipation  Reviewed on 01/29/25  Follows with medical team  Continue senna-docusate sodium dose per medical team.   Last BM 1/17/25    No associated orders from this encounter found  during lookback period of 72 hours.  GERD (gastroesophageal reflux disease)  Reviewed on 01/29/25  Follows with medical  Continue famotidine 20 mg tablet BID per medical team   Continue pantoprazole EC 20 mg tablet every morning per medical team  This is a chronic condition, and is stable unless otherwise noted.      No associated orders from this encounter found during lookback period of 72 hours.  Tobacco abuse  Reviewed on 01/29/25  on nicotine gum as needed and encouraged smoking cessation  Continue to encourage cessation upon discharge      No associated orders from this encounter found during lookback period of 72 hours.  Elevated hemoglobin A1c  Reviewed on 01/29/25  Follows with medical team    No associated orders from this encounter found during lookback period of 72 hours.  Class 2 obesity in adult  Reviewed on 01/29/25  Follows with medical team and given dietary counseling and need for exercise      No associated orders from this encounter found during lookback period of 72 hours.    Primary hypertension  Reviewed on 01/29/25  Follows with medical team on hydrochlorothiazide and Norvasc      No associated orders from this encounter found during lookback period of 72 hours.    Psychiatry Progress Note [facility]    Progress towards goals: moderate progress    Review of systems: denied    Psychiatric Diagnosis: Schizoaffective disorder, bipolar type     Assessment  Overall Status:  progressing, no significant changes   Certification Statement: The patient will continue to require additional inpatient hospital stay due to psychotic related symptoms and limited response to medications and ECT.        Medications: as above  Side effects from treatment: n/a  Medication changes: as above  Medication education: Risks side effects benefits and precautions of medications discussed with patient and they did verbalize an understanding about risks for metabolic syndrome from being on  "neuroleptics and tardive dyskinesia etc.  All medications reviewed and I recommend that they be continued for symptom management  Understanding of medications: Risks side effects benefits and precautions of medications discussed with patient and they did verbalize an understanding about risks for metabolic syndrome from being on neuroleptics and tardive dyskinesia etc., and patient appeared to  have understanding.  Justification for dual anti-psychotics: due to not responding to single antipsychotic     Non-pharmacological treatments  Continue with individual, group, milieu and occupational therapy using recovery principles and psycho-education about accepting illness and the need for treatment.  Behavioral health checks every 7 minutes    Safety  Safety and communication plan established to target dynamic risk factors discussed above.    Discharge Plan   To his Aunt with [facility] [place] for ACT when received     Interval Progress:   Per treatment team, Alberto has had no changes in his behavior. He continues to be visible, pleasant, and social on the unit. He continues to endorse auditory hallucinations that say they are going to kill him when he leaves here. He endorsed to staff mild anxiety and depression yesterday. He has an upcoming bank trip later today. He attended 8/11 groups yesterday.     Today, Alberto reported his mood as \"tired\". He said his goal for the day was to go to his bank appointment and work out. He said that his day was good yesterday. He relayed that all the side effects he had been experiencing when he taking cobenfy have resolved after discontinuing that medication. He endorsed continual auditory hallucinations that say they are going to kill him when he leaves here. He denied command hallucinations or the hallucinations involving his family. He said these voices make him nervous to go home due to what they are saying. He denied SI/HI or passive wishes of death. He said his sleep and " "appetite are good.     Acceptance by patient: accepts inpatient treatment and medications   Hopefulness in recovery: to live with his aunt in the community   Involved in reintegration process: communicates with siblings and aunt   Trusting in relationship with psychiatrist: trusting   Sleep: good  Appetite: good  Compliance with Medications: compliant   Group attendance: 8/11 groups   Significant events: none in the last 24 hours     Mental Status Exam  Appearance: age appropriate, casually dressed, laying in bed asleep with a blanket wrapped over his body and face, adequate hygiene   Behavior: calm, cooperative, no eye contact, kept blanket over face during conversation, polite, easily aroused from sleep   Speech: normal rate, normal volume, normal pitch, clear, coherent  Mood: \"tired\"  Affect: constricted, mood-congruent  Thought Process: organized, logical, coherent, goal directed, linear  Thought Content: some paranoia, intrusive thoughts  Perceptual Disturbances:  auditory hallucinations of voices that say they are going to kill him when he leaves here. He denies command hallucinations or the hallucinations involving his family. Not appearing preoccupied or responding to internal stimuli.  Risk Potential: Suicidal Ideations none, Homicidal Ideations none, and Potential for Aggression No  Sensorium: alert and oriented to person, place, time and situation  Cognition: recent and remote memory grossly intact  Consciousness: alert and awake  Attention: attention span and concentration are age appropriate  Intellect: appears to be of average intelligence  Insight: intact  Judgement: intact  Motor Activity: no abnormal movements     Vitals  Temp:  [97.6 °F (36.4 °C)-97.7 °F (36.5 °C)] 97.6 °F (36.4 °C)  HR:  [77-91] 77  Resp:  [18] 18  BP: (118-120)/(59-63) 120/60  SpO2:  [100 %] 100 %  No intake or output data in the 24 hours ending 01/29/25 1018    Lab Results: All Labs For Current Hospital Admission Reviewed    "     Current Facility-Administered Medications   Medication Dose Route Frequency Provider Last Rate    acetaminophen  650 mg Oral Q4H PRN Jordan C Holter, DO      acetaminophen  650 mg Oral Q6H PRN HOLLI Lion      aluminum-magnesium hydroxide-simethicone  30 mL Oral Q4H PRN Jordan C Holter, DO      amLODIPine  5 mg Oral Daily HOLLI Lion      Artificial Tears  1 drop Both Eyes Q3H PRN Jordan C Holter, DO      haloperidol lactate  2.5 mg Intramuscular Q4H PRN Max 4/day HOLLI Lion      And    LORazepam  1 mg Intramuscular Q4H PRN Max 4/day HOLLI Lion      And    benztropine  0.5 mg Intramuscular Q4H PRN Max 4/day HOLLI Lion      haloperidol lactate  5 mg Intramuscular Q4H PRN Max 4/day HOLLI Lion      And    LORazepam  2 mg Intramuscular Q4H PRN Max 4/day HOLLI Lion      And    benztropine  1 mg Intramuscular Q4H PRN Max 4/day HOLLI Lion      bisacodyl  10 mg Rectal Daily PRN HOLLI Lion      calcium carbonate  500 mg Oral BID PRN HOLLI Monge      cloZAPine  250 mg Oral HS Bora Rosario MD      hydrOXYzine HCL  50 mg Oral Q6H PRN Max 4/day Jordan C Holter, DO      Or    diphenhydrAMINE  50 mg Intramuscular Q6H PRN Jordan C Holter, DO      hydrOXYzine HCL  50 mg Oral Q6H PRN Max 4/day HOLLI Lion      Or    diphenhydrAMINE  50 mg Intramuscular Q6H PRN HOLLI Lion      diphenhydrAMINE-zinc acetate   Topical BID PRN HOLLI Lion      docusate sodium  100 mg Oral BID Jourdan Dickens,       escitalopram  20 mg Oral Daily HOLLI Lion      famotidine  20 mg Oral BID PRN HOLLI Monge      fluticasone  1 spray Each Nare Daily PRN HOLLI Monge      haloperidol  1 mg Oral Q6H PRN HOLLI Lion      haloperidol  2 mg Oral BID Bora Rosario MD      haloperidol  2.5 mg Oral Q4H PRN Max 4/day HOLLI Lion      haloperidol  5 mg Oral Q4H PRN Max 4/day HOLLI Lion       hydroCHLOROthiazide  12.5 mg Oral Daily Pia Mantilla, HOLLI      hydrocortisone   Topical 4x Daily PRN HOLLI Lion      hydrOXYzine HCL  100 mg Oral Q6H PRN Max 4/day [facility] Holter, DO      Or    LORazepam  2 mg Intramuscular Q6H PRN [facility] Holter, DO      hydrOXYzine HCL  100 mg Oral Q6H PRN Max 4/day HOLLI Lion      Or    LORazepam  2 mg Intramuscular Q6H PRN HOLLI Lion      hydrOXYzine HCL  25 mg Oral Q6H PRN Max 4/day [facility] Holter, DO      ibuprofen  600 mg Oral Q8H PRN HOLLI Lion      influenza vaccine  0.5 mL Intramuscular Once Bora Rosario MD      loratadine  10 mg Oral Daily Brina Guillen MD      magnesium hydroxide  30 mL Oral Once Jourdan Dickens, DO      melatonin  3 mg Oral HS PRN Bora Rosario MD      melatonin  9 mg Oral HS Bora Rosario MD      methocarbamol  500 mg Oral Q6H PRN HOLLI Lion      multivitamin-minerals  1 tablet Oral Daily HOLLI Monge      nicotine polacrilex  2 mg Oral Q4H PRN Bora Rosario MD      OLANZapine  5 mg Oral Q4H PRN Max 3/day [facility] Holter, DO      Or    OLANZapine  2.5 mg Intramuscular Q4H PRN Max 3/day [facility] Holter, DO      OLANZapine  5 mg Oral Q3H PRN Max 3/day [facility] Holter, DO      Or    OLANZapine  5 mg Intramuscular Q3H PRN Max 3/day [facility] Holter, DO      OLANZapine  2.5 mg Oral Q4H PRN Max 6/day [facility] Holter, DO      ondansetron  4 mg Oral Q6H PRN HOLLI Lion      pantoprazole  20 mg Oral [facility] Holter, DO      polyethylene glycol  17 g Oral Daily PRN HOLLI Ghotra      polyethylene glycol  17 g Oral Daily Jourdan Dickens, DO      propranolol  10 mg Oral Q12H KAYLIE HOLLI Lion      saliva substitute  5 spray Mouth/Throat 4x Daily PRN Mary Jo Reich PA-C      senna-docusate sodium  1 tablet Oral Daily PRN [facility] Holter, DO      senna-docusate sodium  2 tablet Oral HS Jourdan Dickens, DO      traZODone  50 mg Oral HS PRN HOLLI Lion  petrolatum-mineral oil   Topical TID PRN HOLLI Lion         Counseling / Coordination of Care: Total floor / unit time spent today 15 minutes. Greater than 50% of total time was spent with the patient and / or family counseling and / or somewhat receptive to supportive listening and teaching positive coping skills to deal with symptom mangement.     Patient's Rights, confidentiality and exceptions to confidentiality, use of automated medical record, Behavioral Health Services staff access to medical record, and consent to treatment reviewed.    This note has been dictated and hence there may be problems with punctuation, spelling and formatting and if anyone has any concerns please address them to Dr. Rosario.  This note is not shared with patient due to potential for making patient's condition worse by knowing the content of the note.    Written by Leidy WALLACE and supervised by Dr. Rosario

## 2025-01-29 NOTE — NURSING NOTE
Pt is visible in the milieu and social with select peers. He consumed 100 % of dinner. Took his medications without incidence. Pt is polite, pleasant, and cooperative. Brightens on approach. Pt endorses AH, depression, and anxiety. Played ping pong with peers and attended all evening groups. No unmet needs. No behavioral issues.

## 2025-01-29 NOTE — SOCIAL WORK
This writer accompanied patient to [facility] and inquired on the status of the his account. Cornelio's account's closed and has a negative balance. Mike account closed 1/20/23. This writer and patient called [facility], patient obtained his balance which does not reflect a recent deposit to the account. . To obtain a copy of his bank statement, patient must go to the local [employer] bank. Patient and writer called [employer] and they do not report any recent deposits to his account. This writer and patient called [employer] and they report his account is not active with either [facility] or [facility]. This writer and patient called his sister and Rep Payee [place] and discussed his finances. Ginger reports she received his SSI this month.  This writer also discussed requesting an appeal hearing with Marko NORRIS.

## 2025-01-29 NOTE — ASSESSMENT & PLAN NOTE
Reviewed on 01/29/25  Follows with medical team on hydrochlorothiazide and Norvasc      No associated orders from this encounter found during lookback period of 72 hours.

## 2025-01-29 NOTE — ASSESSMENT & PLAN NOTE
Reviewed on 01/29/25  Follows with medical  Continue famotidine 20 mg tablet BID per medical team   Continue pantoprazole EC 20 mg tablet every morning per medical team  This is a chronic condition, and is stable unless otherwise noted.      No associated orders from this encounter found during lookback period of 72 hours.

## 2025-01-29 NOTE — NURSING NOTE
Patient has been visible on the unit most of the shift.  Quiet but verbal upon conversation. Affect flat. Watching TV with peers. Social with others. Admits feeling depressed without suicidal ideations. Continues to experience auditory hallucinations telling him they will harm him and his family . Verbal support offered. Offers no current complaints.

## 2025-01-29 NOTE — PROGRESS NOTES
01/29/25 0811   Team Meeting   Meeting Type Daily Rounds   Team Members Present   Team Members Present Physician;Nurse;;Other (Discipline and Name)   Physician Team Member Arbaham   Nursing Team Member Claudia   Social Work Team Member Jose J ORTEGA   Other (Discipline and Name) Wang PCM   Patient/Family Present   Patient Present No   Patient's Family Present No     Groups Participation  7/11.   Patient's compliant with medications. He's engaged in his treatment. Bank appointment today. Patient's appropriate. Pending [facility] funding for ACT services to d/c home with family. Started Haldol 2mg.

## 2025-01-29 NOTE — ASSESSMENT & PLAN NOTE
Reviewed on 01/29/25  No significant changes. Still with same auditory hallucinations stating they are going to kill him when he leaves here. Denies command hallucinations    Continue medication as follows:  Continue Clozapine 250 mg QHS for psychosis - increased 12/11/24 01/28: , BNP 19, CRP 10  01/21: ANC 3.95,   To consider further careful titration  CBC w/diff and CK every 2 weeks  CK was increased to 960 on 12/12 and oral hydration encouraged, CK improved to 905 on 12/13/24. Repeat on 12/17/24 decreased to 674 and on 12/20/24 decreased to 448  Labs  drawn on 12/24/2024, repeat  trending down  Labs drawn 01/15, ANC 4.01  Labs drawn 01/08,   Labs drawn 01/15   01/28 Start haldol 2 mg BID for psychosis   01/28 Discontinue Cobenfy  Abilify D/ferny  Continue Lexapro 20 mg daily for depression and anxiety  Robinul D/ferny on 01/13  Atropine D/ferny on 01/13  Continue Propanolol 10 mg BID for anxiety   Continue Melatonin 9 mg QHS for sleep  Continue Senna-Docusate sodium one 50 mg tablet QHS for constipation  Continue Zofran 4 mg tablet Q6H PRN for nausea   Continue PRN mouth kote for xerostomia     Pt remains on dual antipsychotic Tx due to failure on monotherapy   .  S/p ECT treatments.  - ACT team referral was made for him living back with his aunt once MA funding comes through from the [facility] and sister will try to reapply      No associated orders from this encounter found during lookback period of 72 hours.

## 2025-01-29 NOTE — ASSESSMENT & PLAN NOTE
Reviewed on 01/29/25  on nicotine gum as needed and encouraged smoking cessation  Continue to encourage cessation upon discharge      No associated orders from this encounter found during lookback period of 72 hours.

## 2025-01-29 NOTE — ASSESSMENT & PLAN NOTE
Reviewed on 01/29/25  Follows with medical team and given dietary counseling and need for exercise      No associated orders from this encounter found during lookback period of 72 hours.

## 2025-01-29 NOTE — ASSESSMENT & PLAN NOTE
Reviewed on 01/29/25  Follows with medical team    No associated orders from this encounter found during lookback period of 72 hours.

## 2025-01-29 NOTE — PLAN OF CARE
Problem: Alteration in Thoughts and Perception  Goal: Treatment Goal: Gain control of psychotic behaviors/thinking, reduce/eliminate presenting symptoms and demonstrate improved reality functioning upon discharge  Outcome: Progressing  Goal: Refrain from acting on delusional thinking/internal stimuli  Description: Interventions:  - Monitor patient closely, per order   - Utilize least restrictive measures   - Set reasonable limits, give positive feedback for acceptable   - Administer medications as ordered and monitor of potential side effects  Outcome: Progressing  Goal: Agree to be compliant with medication regime, as prescribed and report medication side effects  Description: Interventions:  - Offer appropriate PRN medication and supervise ingestion; conduct AIMS, as needed   Outcome: Progressing  Goal: Recognize dysfunctional thoughts, communicate reality-based thoughts at the time of discharge  Description: Interventions:  - Provide medication and psycho-education to assist patient in compliance and developing insight into his/her illness   Outcome: Progressing     Problem: Ineffective Coping  Goal: Identifies ineffective coping skills  Outcome: Progressing  Goal: Identifies healthy coping skills  Outcome: Progressing  Goal: Demonstrates healthy coping skills  Outcome: Progressing  Goal: Participates in unit activities  Description: Interventions:  - Provide therapeutic environment   - Provide required programming   - Redirect inappropriate behaviors   Outcome: Progressing     Problem: Depression  Goal: Treatment Goal: Demonstrate behavioral control of depressive symptoms, verbalize feelings of improved mood/affect, and adopt new coping skills prior to discharge  Outcome: Progressing  Goal: Refrain from isolation  Description: Interventions:  - Develop a trusting relationship   - Encourage socialization   Outcome: Progressing     Problem: Anxiety  Goal: Anxiety is at manageable level  Description:  Interventions:  - Assess and monitor patient's anxiety level.   - Monitor for signs and symptoms (heart palpitations, chest pain, shortness of breath, headaches, nausea, feeling jumpy, restlessness, irritable, apprehensive).   - Collaborate with interdisciplinary team and initiate plan and interventions as ordered.  - [place] patient to unit/surroundings  - Explain treatment plan  - Encourage participation in care  - Encourage verbalization of concerns/fears  - Identify coping mechanisms  - Assist in developing anxiety-reducing skills  - Administer/offer alternative therapies  - Limit or eliminate stimulants  Outcome: Progressing     Problem: Alteration in Orientation  Goal: Interact with staff daily  Description: Interventions:  - Assess and re-assess patient's level of orientation  - Engage patient in 1 on 1 interactions, daily, for a minimum of 15 minutes   - Establish rapport/trust with patient   Outcome: Progressing  Goal: Complete daily ADLs, including personal hygiene independently, as able  Description: Interventions:  - Observe, teach, and assist patient with ADLS  Outcome: Progressing     Problem: DISCHARGE PLANNING  Goal: Discharge to home with appropriate resources  Description: INTERVENTIONS:  - Identify barriers to discharge w/patient and caregiver  - Arrange for needed discharge resources and transportation as appropriate  - Identify discharge learning needs (meds, wound care, etc.)  - Refer to Case Management Department for coordinating discharge planning if the patient needs post-hospital services based on physician/advanced practitioner order or complex needs related to functional status, cognitive ability, or social support system  Outcome: Progressing

## 2025-01-30 PROCEDURE — 99232 SBSQ HOSP IP/OBS MODERATE 35: CPT | Performed by: PSYCHIATRY & NEUROLOGY

## 2025-01-30 RX ORDER — HALOPERIDOL 5 MG/1
5 TABLET ORAL 2 TIMES DAILY
Status: DISCONTINUED | OUTPATIENT
Start: 2025-01-30 | End: 2025-02-12

## 2025-01-30 RX ADMIN — HALOPERIDOL 2 MG: 1 TABLET ORAL at 08:51

## 2025-01-30 RX ADMIN — HALOPERIDOL 5 MG: 5 TABLET ORAL at 17:22

## 2025-01-30 RX ADMIN — PANTOPRAZOLE SODIUM 20 MG: 20 TABLET, DELAYED RELEASE ORAL at 08:51

## 2025-01-30 RX ADMIN — DOCUSATE SODIUM 100 MG: 100 CAPSULE, LIQUID FILLED ORAL at 17:22

## 2025-01-30 RX ADMIN — LORATADINE 10 MG: 10 TABLET ORAL at 08:52

## 2025-01-30 RX ADMIN — DOCUSATE SODIUM 100 MG: 100 CAPSULE, LIQUID FILLED ORAL at 08:50

## 2025-01-30 RX ADMIN — PROPRANOLOL HYDROCHLORIDE 10 MG: 10 TABLET ORAL at 21:23

## 2025-01-30 RX ADMIN — MELATONIN TAB 3 MG 9 MG: 3 TAB at 21:22

## 2025-01-30 RX ADMIN — MULTIPLE VITAMINS W/ MINERALS TAB 1 TABLET: TAB ORAL at 08:50

## 2025-01-30 RX ADMIN — CLOZAPINE 250 MG: 25 TABLET ORAL at 21:23

## 2025-01-30 RX ADMIN — NICOTINE POLACRILEX 2 MG: 2 GUM, CHEWING ORAL at 17:23

## 2025-01-30 RX ADMIN — NICOTINE POLACRILEX 2 MG: 2 GUM, CHEWING ORAL at 21:23

## 2025-01-30 RX ADMIN — NICOTINE POLACRILEX 2 MG: 2 GUM, CHEWING ORAL at 12:37

## 2025-01-30 RX ADMIN — ESCITALOPRAM OXALATE 20 MG: 10 TABLET, FILM COATED ORAL at 08:52

## 2025-01-30 RX ADMIN — SENNOSIDES AND DOCUSATE SODIUM 2 TABLET: 50; 8.6 TABLET ORAL at 21:23

## 2025-01-30 NOTE — PLAN OF CARE
Problem: Alteration in Thoughts and Perception  Goal: Treatment Goal: Gain control of psychotic behaviors/thinking, reduce/eliminate presenting symptoms and demonstrate improved reality functioning upon discharge  Outcome: Progressing  Goal: Refrain from acting on delusional thinking/internal stimuli  Description: Interventions:  - Monitor patient closely, per order   - Utilize least restrictive measures   - Set reasonable limits, give positive feedback for acceptable   - Administer medications as ordered and monitor of potential side effects  Outcome: Progressing  Goal: Agree to be compliant with medication regime, as prescribed and report medication side effects  Description: Interventions:  - Offer appropriate PRN medication and supervise ingestion; conduct AIMS, as needed   Outcome: Progressing     Problem: Ineffective Coping  Goal: Identifies healthy coping skills  Outcome: Progressing  Goal: Demonstrates healthy coping skills  Outcome: Progressing  Goal: Participates in unit activities  Description: Interventions:  - Provide therapeutic environment   - Provide required programming   - Redirect inappropriate behaviors   Outcome: Progressing     Problem: Depression  Goal: Verbalize thoughts and feelings  Description: Interventions:  - Assess and re-assess patient's level of risk   - Engage patient in 1:1 interactions, daily, for a minimum of 15 minutes   - Encourage patient to express feelings, fears, frustrations, hopes   Outcome: Progressing  Goal: Refrain from harming self  Description: Interventions:  - Monitor patient closely, per order   - Supervise medication ingestion, monitor effects and side effects   Outcome: Progressing  Goal: Refrain from isolation  Description: Interventions:  - Develop a trusting relationship   - Encourage socialization   Outcome: Progressing  Goal: Refrain from self-neglect  Outcome: Progressing     Problem: Anxiety  Goal: Anxiety is at manageable level  Description:  Interventions:  - Assess and monitor patient's anxiety level.   - Monitor for signs and symptoms (heart palpitations, chest pain, shortness of breath, headaches, nausea, feeling jumpy, restlessness, irritable, apprehensive).   - Collaborate with interdisciplinary team and initiate plan and interventions as ordered.  - [place] patient to unit/surroundings  - Explain treatment plan  - Encourage participation in care  - Encourage verbalization of concerns/fears  - Identify coping mechanisms  - Assist in developing anxiety-reducing skills  - Administer/offer alternative therapies  - Limit or eliminate stimulants  Outcome: Progressing     Problem: Alteration in Orientation  Goal: Complete daily ADLs, including personal hygiene independently, as able  Description: Interventions:  - Observe, teach, and assist patient with ADLS  Outcome: Progressing

## 2025-01-30 NOTE — ASSESSMENT & PLAN NOTE
Reviewed 1/30/25  Follows with medical team    No associated orders from this encounter found during lookback period of 72 hours.

## 2025-01-30 NOTE — NURSING NOTE
Patient visible on the unit most of the day. Admits feeling less nauseous with no headache since the [place] was discontinued. Admits having auditory hallucinations telling him they will hurt him and his family. Verbal support given. Medication compliant. No PRN medications given. Appetite good. Observed talking on the phone. Playing ping pong with select male peers. Attends most groups.

## 2025-01-30 NOTE — NURSING NOTE
Patient has been visible on the unit most of the shift . He admits feeling better with less nauses since the [place]y was discontinued. Verbal support given. Hopeful for discharge. Denies suicidal ideations. Interacts with peers. Attends most groups. Medication compliant. No complaints this shift. Admits having intermittent auditory hallucinations saying they are going to harm him and his family. Support offered.

## 2025-01-30 NOTE — ASSESSMENT & PLAN NOTE
Reviewed 1/30/25  No significant changes. Still with same auditory hallucinations stating they are going to kill him when he leaves here. Denies command hallucinations    Continue medication as follows:  Continue Clozapine 250 mg QHS for psychosis - increased 12/11/24 01/28: , BNP 19, CRP 10  01/21: ANC 3.95,   To consider further careful titration  CBC w/diff and CK every 2 weeks  Increased haldol to 5 mg BID for psychosis   Continue Lexapro 20 mg daily for depression and anxiety  Continue Propanolol 10 mg BID for anxiety   Continue Melatonin 9 mg QHS for sleep  Continue Senna-Docusate sodium one 50 mg tablet QHS for constipation  Continue Zofran 4 mg tablet Q6H PRN for nausea   Continue PRN mouth kote for xerostomia     Pt remains on dual antipsychotic Tx due to failure on monotherapy   .  S/p ECT treatments.  - ACT team referral was made for him living back with his aunt once MA funding comes through from the [facility] and sister will try to reapply      No associated orders from this encounter found during lookback period of 72 hours.

## 2025-01-30 NOTE — PLAN OF CARE
Problem: Ineffective Coping  Goal: Identifies ineffective coping skills  Outcome: Progressing  Goal: Identifies healthy coping skills  Outcome: Progressing  Goal: Demonstrates healthy coping skills  Outcome: Progressing  Goal: Participates in unit activities  Description: Interventions:  - Provide therapeutic environment   - Provide required programming   - Redirect inappropriate behaviors   Outcome: Progressing   Continues to work towards the goals.

## 2025-01-30 NOTE — ASSESSMENT & PLAN NOTE
Reviewed 1/30/25  Follows with medical team  Continue senna-docusate sodium dose per medical team.   Last BM 1/17/25    No associated orders from this encounter found during lookback period of 72 hours.

## 2025-01-30 NOTE — PROGRESS NOTES
01/30/25 0836   Team Meeting   Meeting Type Daily Rounds   Team Members Present   Team Members Present Physician;Nurse;;Other (Discipline and Name)   Physician Team Member Thomas, Holter   Nursing Team Member Claudia   Social Work Team Member Jose J ORTEGA   Other (Discipline and Name) Wang PCM   Patient/Family Present   Patient Present No   Patient's Family Present No     Groups Participation  8/8.   Patient's compliant with medications. He's engaged in his treatment. Will request an appeal hearing on MA determination. Waiting on [facility] for ACT services to be discharge home to family.

## 2025-01-30 NOTE — ASSESSMENT & PLAN NOTE
Reviewed 1/30/25  Follows with medical team on hydrochlorothiazide and Norvasc      No associated orders from this encounter found during lookback period of 72 hours.

## 2025-01-30 NOTE — ASSESSMENT & PLAN NOTE
Reviewed 1/30/25  Follows with medical team and given dietary counseling and need for exercise      No associated orders from this encounter found during lookback period of 72 hours.

## 2025-01-30 NOTE — NURSING NOTE
Pt in bed asleep, observed eyes closed, and chest movement noted. 15 minute safety checks maintained. No unmet needs at this time. Will continue to monitor patient needs, sleep pattern, and behaviors.     0643: Patient has slept all night, 7+ hours of uninterrupted sleep

## 2025-01-30 NOTE — PROGRESS NOTES
Progress Note - Behavioral Health   Name: Alberto Berumen 29 y.o. male I MRN: [id number]  Unit/Bed#: [facility] 112-02 I Date of Admission: 3/29/2024   Date of Service: 1/30/2025 I Hospital Day: 307     Assessment & Plan  Schizoaffective disorder, bipolar type (HCC)  Reviewed 1/30/25  No significant changes. Still with same auditory hallucinations stating they are going to kill him when he leaves here. Denies command hallucinations    Continue medication as follows:  Continue Clozapine 250 mg QHS for psychosis - increased 12/11/24 01/28: , BNP 19, CRP 10  01/21: ANC 3.95,   To consider further careful titration  CBC w/diff and CK every 2 weeks  Increased haldol to 5 mg BID for psychosis   Continue Lexapro 20 mg daily for depression and anxiety  Continue Propanolol 10 mg BID for anxiety   Continue Melatonin 9 mg QHS for sleep  Continue Senna-Docusate sodium one 50 mg tablet QHS for constipation  Continue Zofran 4 mg tablet Q6H PRN for nausea   Continue PRN mouth kote for xerostomia     Pt remains on dual antipsychotic Tx due to failure on monotherapy   .  S/p ECT treatments.  - ACT team referral was made for him living back with his aunt once MA funding comes through from the [facility] and sister will try to reapply      No associated orders from this encounter found during lookback period of 72 hours.    Chronic idiopathic constipation  Reviewed 1/30/25  Follows with medical team  Continue senna-docusate sodium dose per medical team.   Last BM 1/17/25    No associated orders from this encounter found during lookback period of 72 hours.  GERD (gastroesophageal reflux disease)  Reviewed 1/30/25  Follows with medical  Continue famotidine 20 mg tablet BID per medical team   Continue pantoprazole EC 20 mg tablet every morning per medical team  This is a chronic condition, and is stable unless otherwise noted.      No associated orders from this encounter found during lookback period of 72  "hours.  Tobacco abuse  Reviewed 1/30/25  on nicotine gum as needed and encouraged smoking cessation  Continue to encourage cessation upon discharge      No associated orders from this encounter found during lookback period of 72 hours.  Elevated hemoglobin A1c  Reviewed 1/30/25  Follows with medical team    No associated orders from this encounter found during lookback period of 72 hours.  Class 2 obesity in adult  Reviewed 1/30/25  Follows with medical team and given dietary counseling and need for exercise      No associated orders from this encounter found during lookback period of 72 hours.    Primary hypertension  Reviewed 1/30/25  Follows with medical team on hydrochlorothiazide and Norvasc      No associated orders from this encounter found during lookback period of 72 hours.    Plan:  Continue prescribed psychotropic medication regimen  Currently on 201 commitment status  Continue to promote patient participation in therapeutic milieu.  Continue medical management per medicine.  Continue behavioral health checks q.15 minutes.   Continue vitals per behavioral health unit protocol.  Safety: Safety and communication plan established to target dynamic risk factors discussed above.   Discharge and disposition: At this time there are ongoing plans for discharge to the patient's aunts house the following ACT services coordination.  [facility]    SUBJECTIVE      Patient was seen today for continuity of care.  and Prior to patient interview, all available and pertinent information was reviewed including prior documentation, laboratory results, and imaging studies as applicable.     Today, Alberto Ata reports feeling \"okay\".    Alberto reports on the inpatient unit symptoms have been: Staying the same.    At this time, Alberto reports he continues to struggle with ongoing psychosis.  He reports that he continues to endorse ongoing derogatory auditory hallucinations that " "are persecutory in nature.  Unfortunately, he states the voices are \"the same\" today. He remains with paranoia and states the voices will kill him if leaves the hospital.    Alberto reports that, in the context of ongoing psychosis, continues to remain with symptoms of depression and anxiety.  At the time of interview he does not have any suicidal ideations.    Alberto reports he continues to struggle with morning fatigue.  He continues to remain isolative to his room in the morning.    At the time of interview, Alberto voices no acute issues or concerns and he politely states that he does not want to talk much.    Alberto reports: Patient reports eating well, Patient reports sleeping well, and Patient reports taking psychiatric medications as directed and denies medication side effects    Patient denies suicidal ideation, Patient denies homicidal ideation, and Patient denies visual hallucinations      PSYCHIATRIC REVIEW OF SYSTEMS     Behavior over the last 24 hours:  unchanged  Sleep: hypersomnia  Appetite: normal  Medication side effects: No    Progress Toward Goals: Limited progress, as evidenced by their participation (or lack thereof) in individual, social and therapeutic milieu in addition to adherence to their medication regimen.  Patient Acceptance: Fair  Patient Understanding: Fair  Patient Involvement in Reintegration Process: Patient reports he communicates regularly with his siblings and aunt on the phone  Patient's Therapeutic Relationship with Psychiatrist: Trusting  Patient's Optimism Regarding Recovery: Patient is hopeful to return to live with his aunt upon discharge following clinical stabilization  Group Attendance: 8/10    REVIEW OF SYSTEMS   Review of systems: sedation and all other systems are negative    OBJECTIVE     Vital Signs in Past 24 Hours:  Temp:  [97.7 °F (36.5 °C)-98.2 °F (36.8 °C)] 97.7 °F (36.5 °C)  HR:  [62-90] 62  BP: (101-142)/(58-78) 101/58  Resp:  [18] 18  SpO2:  [98 %] 98 " %  O2 Device: None (Room air)    Intake/Output in Past 24 hours:  No intake/output data recorded.  No intake/output data recorded.        Laboratory Results:  I have personally reviewed all pertinent laboratory/tests results.  Most Recent Labs:   Lab Results   Component Value Date    WBC 8.93 01/21/2025    RBC 5.53 01/21/2025    HGB 12.8 01/21/2025    HCT 42.7 01/21/2025     01/21/2025    RDW 14.0 01/21/2025    NEUTROABS 3.95 01/21/2025    SODIUM 140 10/16/2024    K 3.8 10/16/2024     10/16/2024    CO2 29 10/16/2024    BUN 9 10/16/2024    CREATININE 0.91 10/16/2024    GLUC 94 10/16/2024    GLUF 94 10/16/2024    CALCIUM 9.5 10/16/2024    AST 26 09/30/2024    ALT 42 09/30/2024    ALKPHOS 64 09/30/2024    TP 6.6 09/30/2024    ALB 3.8 09/30/2024    TBILI 0.47 09/30/2024    CHOLESTEROL 156 03/14/2024    HDL 44 03/14/2024    TRIG 133 03/14/2024    LDLCALC 85 03/14/2024    NONHDLC 112 03/14/2024    LITHIUM 0.61 01/09/2024    [id number] 1.062 11/15/2023    HGBA1C 5.0 04/01/2024    EAG 97 04/01/2024       Behavioral Health Medications: all current active meds have been reviewed.    Current Facility-Administered Medications   Medication Dose Route Frequency Provider Last Rate    acetaminophen  650 mg Oral Q4H PRN Jordan C Holter, DO      acetaminophen  650 mg Oral Q6H PRN HOLLI Lion      aluminum-magnesium hydroxide-simethicone  30 mL Oral Q4H PRN Jordan C Holter, DO      amLODIPine  5 mg Oral Daily HOLLI Lion      Artificial Tears  1 drop Both Eyes Q3H PRN Jordan C Holter, DO      haloperidol lactate  2.5 mg Intramuscular Q4H PRN Max 4/day HOLLI Lion      And    LORazepam  1 mg Intramuscular Q4H PRN Max 4/day HOLLI Lion      And    benztropine  0.5 mg Intramuscular Q4H PRN Max 4/day HOLLI Lion      haloperidol lactate  5 mg Intramuscular Q4H PRN Max 4/day HOLLI Lion      And    LORazepam  2 mg Intramuscular Q4H PRN Max 4/day HOLLI Lion      And     benztropine  1 mg Intramuscular Q4H PRN Max 4/day HOLLI Lion      bisacodyl  10 mg Rectal Daily PRN HOLLI Lion      calcium carbonate  500 mg Oral BID PRN HOLLI Monge      cloZAPine  250 mg Oral HS Bora Rosario MD      hydrOXYzine HCL  50 mg Oral Q6H PRN Max 4/day Jordan C Holter, DO      Or    diphenhydrAMINE  50 mg Intramuscular Q6H PRN Jordan C Holter, DO      hydrOXYzine HCL  50 mg Oral Q6H PRN Max 4/day HOLLI Lion      Or    diphenhydrAMINE  50 mg Intramuscular Q6H PRN HOLLI Lion      diphenhydrAMINE-zinc acetate   Topical BID PRN HOLLI Lion      docusate sodium  100 mg Oral BID Jourdan Dickens,       escitalopram  20 mg Oral Daily HOLLI Lion      famotidine  20 mg Oral BID PRN HOLLI Monge      fluticasone  1 spray Each Nare Daily PRN HOLLI Monge      haloperidol  1 mg Oral Q6H PRN HOLLI Lion      haloperidol  2.5 mg Oral Q4H PRN Max 4/day HOLLI Lion      haloperidol  5 mg Oral Q4H PRN Max 4/day HOLLI Lion      haloperidol  5 mg Oral BID Bora Rosario MD      hydroCHLOROthiazide  12.5 mg Oral Daily Pia Mantilla, HOLLI      hydrocortisone   Topical 4x Daily PRN HOLLI Lion      hydrOXYzine HCL  100 mg Oral Q6H PRN Max 4/day Jordan C Holter, DO      Or    LORazepam  2 mg Intramuscular Q6H PRN Jordan C Holter, DO      hydrOXYzine HCL  100 mg Oral Q6H PRN Max 4/day HOLLI Lion      Or    LORazepam  2 mg Intramuscular Q6H PRN HOLLI Lion      hydrOXYzine HCL  25 mg Oral Q6H PRN Max 4/day Jordan C Holter, DO      ibuprofen  600 mg Oral Q8H PRN HOLLI Lion      influenza vaccine  0.5 mL Intramuscular Once Bora Rosario MD      loratadine  10 mg Oral Daily Brina Guillen MD      magnesium hydroxide  30 mL Oral Once Jourdan Dickens, DO      melatonin  3 mg Oral HS PRN Bora Rosario MD      melatonin  9 mg Oral HS Bora Rosario MD      methocarbamol  500 mg  "Oral Q6H PRN HOLLI Lion      multivitamin-minerals  1 tablet Oral Daily Eileen GonzalezHOLLI evans      nicotine polacrilex  2 mg Oral Q4H PRN Bora Rosario MD      OLANZapine  5 mg Oral Q4H PRN Max 3/day [facility] Holter, DO      Or    OLANZapine  2.5 mg Intramuscular Q4H PRN Max 3/day [facility] Holter, DO      OLANZapine  5 mg Oral Q3H PRN Max 3/day [facility] Holter, DO      Or    OLANZapine  5 mg Intramuscular Q3H PRN Max 3/day [facility] Holter, DO      OLANZapine  2.5 mg Oral Q4H PRN Max 6/day [facility] Holter, DO      ondansetron  4 mg Oral Q6H PRN HOLLI Lion      [facility]antoprazole  [facility]      polyethylene glycol  17 g Oral Daily PRN Sofi Yoo, HOLLI      polyethylene glycol  17 g Oral Daily Jourdan Dickens, DO      propranolol  10 mg Oral Q12H KAYLIE HOLLI Lion      saliva substitute  5 spray Mouth/Throat 4x Daily PRN Mary Jo Reich PA-C      senna-docusate sodium  1 tablet Oral Daily PRN [facility] Holter, DO      senna-docusate sodium  2 tablet Oral HS Jourdan Dickens, DO      traZODone  50 mg Oral HS PRN HOLLI Lion      white petrolatum-mineral oil   Topical TID PRN HOLLI Lion         Risks, benefits and possible side effects of Medications:   Risks, benefits, and possible side effects of medications explained to patient and patient verbalizes understanding.      Dual Antipsychotic Rationale: At this time the patient is currently on dual antipsychotics due to multiple past failed antipsychotic medication trials and treatment failure on monotherapy antipsychotic agent    Mental Status Evaluation:  Appearance:  Age appropriate, casually dressed, overweight, adequate grooming and hygiene, limited eye contact, no acute distress    Behavior:  polite, limited interview cooperation   Speech:  Soft, normal rate, clear, coherent    Mood:  \"okay\"   Affect:  blunted, depressed   Thought Process:  [place], linear, goal-directed   Associations: [place] " associations   Thought Content:  Remains with paranoia in the context of ongoing struggles with auditory hallucinations   Perceptual Disturbances: Continues to experience ongoing auditory hallucinations that are derogatory and comments and states they are going to kill him.  He denies command auditory hallucinations.  He denies visual hallucinations.  He continues to appear internally preoccupied   Risk Potential: Suicidal ideation --denies  Homicidal ideation -denies  Potential for aggression - Not at present   Sensorium:  oriented to person, place, and time/date   Memory:  recent and remote memory grossly intact   Consciousness:  alert and awake   Attention/Concentration: attention span and concentration appear shorter than expected for age   Insight:  limited   Judgment: limited   Gait/Station: in bed   Motor Activity: no abnormal movements     Recommended Treatment:   See above for assessment and plan.    Counseling/Coordination of Care    Total unit time spent today was greater than 15 minutes. Greater than 50% of total time was spent with the patient and/or patient's relatives and/or coordination of patient's care.     Patient's rights, confidentiality, exceptions to confidentiality, use of electronic medical record including appropriate staff access to medical record regarding behavioral health services and consent to treatment were reviewed.    Note Share:     This note was not shared with the patient due to reasonable likelihood of causing patient harm     Hardik So MD   Psychiatry, PGY-4

## 2025-01-30 NOTE — ASSESSMENT & PLAN NOTE
Reviewed 1/30/25  on nicotine gum as needed and encouraged smoking cessation  Continue to encourage cessation upon discharge      No associated orders from this encounter found during lookback period of 72 hours.

## 2025-01-30 NOTE — ASSESSMENT & PLAN NOTE
Reviewed 1/30/25  Follows with medical  Continue famotidine 20 mg tablet BID per medical team   Continue pantoprazole EC 20 mg tablet every morning per medical team  This is a chronic condition, and is stable unless otherwise noted.      No associated orders from this encounter found during lookback period of 72 hours.

## 2025-01-31 PROCEDURE — 99232 SBSQ HOSP IP/OBS MODERATE 35: CPT | Performed by: PSYCHIATRY & NEUROLOGY

## 2025-01-31 RX ADMIN — CLOZAPINE 250 MG: 25 TABLET ORAL at 21:08

## 2025-01-31 RX ADMIN — ESCITALOPRAM OXALATE 20 MG: 10 TABLET, FILM COATED ORAL at 09:03

## 2025-01-31 RX ADMIN — DOCUSATE SODIUM 100 MG: 100 CAPSULE, LIQUID FILLED ORAL at 17:00

## 2025-01-31 RX ADMIN — PANTOPRAZOLE SODIUM 20 MG: 20 TABLET, DELAYED RELEASE ORAL at 09:02

## 2025-01-31 RX ADMIN — HALOPERIDOL 5 MG: 5 TABLET ORAL at 09:03

## 2025-01-31 RX ADMIN — LORATADINE 10 MG: 10 TABLET ORAL at 09:03

## 2025-01-31 RX ADMIN — HYDROCHLOROTHIAZIDE 12.5 MG: 12.5 TABLET ORAL at 09:03

## 2025-01-31 RX ADMIN — NICOTINE POLACRILEX 2 MG: 2 GUM, CHEWING ORAL at 09:03

## 2025-01-31 RX ADMIN — HALOPERIDOL 5 MG: 5 TABLET ORAL at 17:00

## 2025-01-31 RX ADMIN — SENNOSIDES AND DOCUSATE SODIUM 2 TABLET: 50; 8.6 TABLET ORAL at 21:08

## 2025-01-31 RX ADMIN — DOCUSATE SODIUM 100 MG: 100 CAPSULE, LIQUID FILLED ORAL at 09:03

## 2025-01-31 RX ADMIN — PROPRANOLOL HYDROCHLORIDE 10 MG: 10 TABLET ORAL at 21:08

## 2025-01-31 RX ADMIN — MULTIPLE VITAMINS W/ MINERALS TAB 1 TABLET: TAB ORAL at 09:03

## 2025-01-31 RX ADMIN — PROPRANOLOL HYDROCHLORIDE 10 MG: 10 TABLET ORAL at 09:02

## 2025-01-31 RX ADMIN — AMLODIPINE BESYLATE 5 MG: 5 TABLET ORAL at 09:03

## 2025-01-31 RX ADMIN — MELATONIN TAB 3 MG 9 MG: 3 TAB at 21:08

## 2025-01-31 RX ADMIN — NICOTINE POLACRILEX 2 MG: 2 GUM, CHEWING ORAL at 16:57

## 2025-01-31 NOTE — PROGRESS NOTES
01/31/25 0821   Team Meeting   Meeting Type Daily Rounds   Team Members Present   Team Members Present Physician;Nurse;;Other (Discipline and Name)   Physician Team Member Thomas, Holter   Nursing Team Member Claudia   Social Work Team Member Jose J ORTEGA   Other (Discipline and Name) Wang PCM   Patient/Family Present   Patient Present No   Patient's Family Present No     Groups Participation  5/9.   Patient's compliant with medications. He's not engaged in his treatment. He's appropriate and engaged with his peers. Patient waiting for [facility] for ACT services. Haldol increased 5mg BID.

## 2025-01-31 NOTE — PROGRESS NOTES
Progress Note - Behavioral Health   Name: Alberto Berumen 29 y.o. male I MRN: [id number]  Unit/Bed#: [facility] 112-02 I Date of Admission: 3/29/2024   Date of Service: 1/31/2025 I Hospital Day: 308     Assessment & Plan  Schizoaffective disorder, bipolar type (HCC)  Reviewed 1/31/25  Tolerating increase in Haldol as 5 mg twice a day but no significant changes with the voices that are still threatening to kill him and still trying to cope with the voices playing [employer]-pong and attending groups with no need for behavioral PRNs.  Not having any suicidal thoughts but still somewhat depressed over the chronic nature of voices for over 3 years now    Continue medication as follows:  Continue Clozapine 250 mg QHS for psychosis - increased 12/11/24 01/28: , BNP 19, CRP 10  01/21: ANC 3.95,   To consider further careful titration  CBC w/diff and CK every 2 weeks  Increased haldol to 5 mg BID for psychosis   Continue Lexapro 20 mg daily for depression and anxiety  Continue Propanolol 10 mg BID for anxiety   Continue Melatonin 9 mg QHS for sleep  Continue Senna-Docusate sodium one 50 mg tablet QHS for constipation  Continue Zofran 4 mg tablet Q6H PRN for nausea   Continue PRN mouth kote for xerostomia     Pt remains on dual antipsychotic Tx due to failure on monotherapy   .  S/p ECT treatments.  - ACT team referral was made for him living back with his aunt once MA funding comes through from the [facility] and sister will try to reapply      No associated orders from this encounter found during lookback period of 72 hours.    Chronic idiopathic constipation  Reviewed 1/31/25  Follows with medical team  Continue senna-docusate sodium dose per medical team.   Last BM 1/17/25    No associated orders from this encounter found during lookback period of 72 hours.  GERD (gastroesophageal reflux disease)  Reviewed 1/31/25  Follows with medical  Continue famotidine 20 mg tablet BID per medical team   Continue  pantoprazole EC 20 mg tablet every morning per medical team  This is a chronic condition, and is stable unless otherwise noted.      No associated orders from this encounter found during lookback period of 72 hours.  Tobacco abuse  Reviewed 1/31/25  on nicotine gum as needed and encouraged smoking cessation  Continue to encourage cessation upon discharge      No associated orders from this encounter found during lookback period of 72 hours.  Elevated hemoglobin A1c  Reviewed 1/31/25  Follows with medical team    No associated orders from this encounter found during lookback period of 72 hours.  Class 2 obesity in adult  Reviewed 1/30/25  Follows with medical team and given dietary counseling and need for exercise      No associated orders from this encounter found during lookback period of 72 hours.    Primary hypertension  Reviewed 1/31/25  Follows with medical team on hydrochlorothiazide and Norvasc      No associated orders from this encounter found during lookback period of 72 hours.    Patient Active Problem List   Diagnosis    GERD (gastroesophageal reflux disease)    Schizoaffective disorder, bipolar type (HCC)    Tobacco abuse    T wave inversion in EKG    Syringoma    Chronic idiopathic constipation    Vitamin B 12 deficiency    Vitamin D deficiency    Confluent and reticulate papillomatosis    Class 2 obesity in adult    Primary hypertension    Elevated hemoglobin A1c    Bilateral lower extremity edema     Review of systems: Unremarkable   assessment  Overall Status: No significant changes  certification Statement: The patient will continue to require additional inpatient hospital stay due to ongoing voices that are threatening to mixing lack of response to medications and ECT until eligible for an ACT team to live with his aunt.     Medications:  propranolol 10 mg every 12 hours  for anxiety, Lexapro 20 mg once a day,  and senna once a day day for constipation clozapine 250 mg at bedtime, Robinul 1 mg  twice a day and 2 mg at bedtime for drooling from Clozapine, Colace 100 mg twice a day with MiraLAX daily for constipation from Clozapine, Haldol 5 mg twice a day    side effects from treatment: None reported  Medication changes   Haldol increased as 5 mg twice a day as of 1/30/2025  medication education  Risks side effects benefits and precautions of medications discussed with patient and he did verbalize an understanding about risks for metabolic syndrome from being on neuroleptics and risk for tardive dyskinesia etc.   All medications reviewed  Understanding of medications: Has some understanding  Justification for dual anti-psychotics: due to not responding to single antipsychotics    Non-pharmacological treatments  Continue with individual, group, milieu and occupational therapy using recovery principles and psycho-education about accepting illness and the need for treatment.  Waiting for [facility] to fund an ACT team as he lost his MA benefits and to live with aunt in the [facility]  Maintenance ECTs  completed several months ago  Refuses to see a dietician and agrees to do more exercises as he is about 100 lbs overweight   Prolactin level high so Risperdal replaced with Abilify which he has tolerated well so far with prolactin level back to normal  Counseled to take the stool softeners and laxatives regularly  Dietary counseling given to watch diet and to exercise    Safety  Safety and communication plan established to target dynamic risk factors discussed above.    Discharge Plan back to his aunt in the [facility] once his MA benefits are reinstated with an ACT team    Interval Progress   No new problems or concerns but still hearing the same threatening voices for over 3 years to kill him and his family when he gets out which has been going on and has not used or gone away despite clozapine with the Abilify and with Cobenfy which he could not tolerate because of side effects and addition of Haldol recently.  He is  trying to learn to cope by distraction techniques like punching the punching bag and playing ping-pong and attending groups.  Has been polite friendly pleasant when approached better groomed but prefers to sleep on bed in the mornings.  Does talk with his sisters who is visit with him from [place] and his aunt and we are still waiting for ACT funding to come through from the [facility] before he can be discharged to the aunt from the [facility].  No behavioral issues and no need for behavioral PRNs lately and not self-abusive or suicidal  acceptance by patient: Accepting  Hopefulness in recovery: Living with aunt  Involved in reintegration process: Talking and meeting with sister's and aunt  trusting in relationship with psychiatrist: Trusting  Sleep: Good  Appetite: Good  Compliance with Medications: Compliant  Group attendance: Most of the groups 5/9  Significant events and progress towards goals: Status quo    Mental Status Exam  Appearance: age appropriate, improved grooming, looks older than stated age, overweight, with hair in dreadlocks casually dressed with a clean shaven face, fairly groomed with good eye contact found pacing the hallway with good eye contact well-groomed, found laying on bed under the covers today when approached no complaints easily aroused   behavior: Polite friendly pleasant with good eye contact   speech: normal rate, normal volume, normal pitch  Mood: dysphoric, anxious, continues to report feeling very good because of the voices that are never going away  affect: constricted, inappropriate, mood-congruent, with no good mood reactivity .    thought Process: organized, logical, coherent, goal directed, linear, decreased rate of thoughts, slowing of thoughts, negative thinking, impaired abstract reasoning, concrete  Thought Content: paranoid ideation, some paranoia, grandiose ideas, intrusive thoughts, preoccupied, chronic, continues to report paranoia about people threatening to kill him  and his family because of the voices.  He believes ECT has helped so that he is not much in his head.  No other delusions elicited.  No current suicidal or homicidal thoughts and no plans verbalized but today reports having passive death wishes because of voices with no plans and able to CFS and it has not changed yet  .  No phobias obsessions compulsions or distorted body perceptions reported.   Perceptual Disturbances: Same threatening voices to kill him and his family when he gets out which he has been hearing for over 3 years   Hx Risk Factors: chronic psychiatric problems, chronic anxiety symptoms, chronic psychotic symptoms,    Sensorium: Oriented x 3 spheres and situation  Attention and concentration span: intact  Cognition: recent and remote memory grossly intact  Consciousness: alert and awake  Intellect: appears to be of average intelligence  Insight: intact  Judgement: intact  Motor Activity: no abnormal movements     Vitals  Temp:  [97.7 °F (36.5 °C)] 97.7 °F (36.5 °C)  HR:  [62-92] 88  Resp:  [18] 18  BP: (101-117)/(58-78) 112/67  SpO2:  [98 %] 98 %  No intake or output data in the 24 hours ending 01/31/25 0422          Lab Results:  today   Current Facility-Administered Medications   Medication Dose Route Frequency Provider Last Rate    acetaminophen  650 mg Oral Q4H PRN Jordan C Holter,       acetaminophen  650 mg Oral Q6H PRN HOLLI Lion      aluminum-magnesium hydroxide-simethicone  30 mL Oral Q4H PRN Jordan C Holter,       amLODIPine  5 mg Oral Daily HOLLI Lion      Artificial Tears  1 drop Both Eyes Q3H PRN Jordan C Holter,       haloperidol lactate  2.5 mg Intramuscular Q4H PRN Max 4/day HOLLI Lion      And    LORazepam  1 mg Intramuscular Q4H PRN Max 4/day HOLLI Lion      And    benztropine  0.5 mg Intramuscular Q4H PRN Max 4/day HOLLI Lion      haloperidol lactate  5 mg Intramuscular Q4H PRN Max 4/day HOLLI Lion      And    LORazepam  2  mg Intramuscular Q4H PRN Max 4/day HOLLI Lion      And    benztropine  1 mg Intramuscular Q4H PRN Max 4/day HOLLI Lion      bisacodyl  10 mg Rectal Daily PRN HOLLI Lion      calcium carbonate  500 mg Oral BID PRN HOLLI Monge      cloZAPine  250 mg Oral HS Bora Roasrio MD      hydrOXYzine HCL  50 mg Oral Q6H PRN Max 4/day Jordan C Holter, DO      Or    diphenhydrAMINE  50 mg Intramuscular Q6H PRN Ion Dupontter, DO      hydrOXYzine HCL  50 mg Oral Q6H PRN Max 4/day HOLLI Lion      Or    diphenhydrAMINE  50 mg Intramuscular Q6H PRN HOLLI Lion      diphenhydrAMINE-zinc acetate   Topical BID PRN HOLLI Lion      docusate sodium  100 mg Oral BID Jourdan Dickens, DO      escitalopram  20 mg Oral Daily HOLLI Lion      famotidine  20 mg Oral BID PRN HOLLI Monge      fluticasone  1 spray Each Nare Daily PRN HOLLI Monge      haloperidol  1 mg Oral Q6H PRN HOLLI Lion      haloperidol  2.5 mg Oral Q4H PRN Max 4/day HOLLI Lion      haloperidol  5 mg Oral Q4H PRN Max 4/day HOLLI Lion      haloperidol  5 mg Oral BID Bora Rosario MD      hydroCHLOROthiazide  12.5 mg Oral Daily Pia Mantilla, HOLLI      hydrocortisone   Topical 4x Daily PRN HOLLI Lion      hydrOXYzine HCL  100 mg Oral Q6H PRN Max 4/day Ion Dupontter, DO      Or    LORazepam  2 mg Intramuscular Q6H PRN Ion Dupontter, DO      hydrOXYzine HCL  100 mg Oral Q6H PRN Max 4/day HOLLI Lion      Or    LORazepam  2 mg Intramuscular Q6H PRN HOLLI Lion      hydrOXYzine HCL  25 mg Oral Q6H PRN Max 4/day Ion FISCHER Holter, DO      ibuprofen  600 mg Oral Q8H PRN HOLLI Lion      influenza vaccine  0.5 mL Intramuscular Once Bora Rosario MD      loratadine  10 mg Oral Daily Brina Guillen MD      magnesium hydroxide  30 mL Oral Once Jourdan Dickens, DO      melatonin  3 mg Oral HS PRN Bora Rosario MD       melatonin  9 mg Oral HS Bora Rosario MD      methocarbamol  500 mg Oral Q6H PRN HOLLI Lion      multivitamin-minerals  1 tablet Oral Daily Eileen GonzalezmiryamjaylenHOLLI      nicotine polacrilex  2 mg Oral Q4H PRN Bora Rosario MD      OLANZapine  5 mg Oral Q4H PRN Max 3/day [facility] Holter, DO      Or    OLANZapine  2.5 mg Intramuscular Q4H PRN Max 3/day [facility] Holter, DO      OLANZapine  5 mg Oral Q3H PRN Max 3/day [facility] Holter, DO      Or    OLANZapine  5 mg Intramuscular Q3H PRN Max 3/day [facility] Holter, DO      OLANZapine  2.5 mg Oral Q4H PRN Max 6/day [facility] Holter, DO      ondansetron  4 mg Oral Q6H PRN HOLLI Lion      pantoprazole  20 mg Oral QAM [facility] Holter, DO      polyethylene glycol  17 g Oral Daily PRN HOLLI Ghotra      polyethylene glycol  17 g Oral Daily Jourdan Dickens, DO      propranolol  10 mg Oral Q12H KAYLIE HOLLI Lion      saliva substitute  5 spray Mouth/Throat 4x Daily PRN Mary Jo Reich PA-C      senna-docusate sodium  1 tablet Oral Daily PRN [facility] Holter, DO      senna-docusate sodium  2 tablet Oral HS Jourdan Dickens,       traZODone  50 mg Oral HS PRN HOLLI Lion      white petrolatum-mineral oil   Topical TID PRN HOLLI Lion         Counseling / Coordination of Care: Total floor / unit time spent today 15 minutes. Greater than 50% of total time was spent with the patient and / or family counseling and / or somewhat receptive to supportive listening and teaching positive coping skills to deal with symptom mangement.     Patient's Rights, confidentiality and exceptions to confidentiality, use of automated medical record, Behavioral Health Services staff access to medical record, and consent to treatment reviewed.    This note has been dictated and hence there may be problems with punctuation, spelling and formatting and if anyone has any concerns please address them to Dr. Rosario   This note is not shared with patient due to  potential for making patient's condition worse by knowing the content of the note.  Ms. Reese occasionally medications occasionally but that she is not happy

## 2025-01-31 NOTE — ASSESSMENT & PLAN NOTE
Reviewed 1/31/25  Tolerating increase in Haldol as 5 mg twice a day but no significant changes with the voices that are still threatening to kill him and still trying to cope with the voices playing ping-pong and attending groups with no need for behavioral PRNs.  Not having any suicidal thoughts but still somewhat depressed over the chronic nature of voices for over 3 years now    Continue medication as follows:  Continue Clozapine 250 mg QHS for psychosis - increased 12/11/24 01/28: , BNP 19, CRP 10  01/21: ANC 3.95,   To consider further careful titration  CBC w/diff and CK every 2 weeks  Increased haldol to 5 mg BID for psychosis   Continue Lexapro 20 mg daily for depression and anxiety  Continue Propanolol 10 mg BID for anxiety   Continue Melatonin 9 mg QHS for sleep  Continue Senna-Docusate sodium one 50 mg tablet QHS for constipation  Continue Zofran 4 mg tablet Q6H PRN for nausea   Continue PRN mouth kote for xerostomia     Pt remains on dual antipsychotic Tx due to failure on monotherapy   .  S/p ECT treatments.  - ACT team referral was made for him living back with his aunt once MA funding comes through from the [facility] and sister will try to reapply      No associated orders from this encounter found during lookback period of 72 hours.

## 2025-01-31 NOTE — ASSESSMENT & PLAN NOTE
Reviewed 1/31/25  Follows with medical team on hydrochlorothiazide and Norvasc      No associated orders from this encounter found during lookback period of 72 hours.

## 2025-01-31 NOTE — ASSESSMENT & PLAN NOTE
Reviewed 1/31/25  Follows with medical team  Continue senna-docusate sodium dose per medical team.   Last BM 1/17/25    No associated orders from this encounter found during lookback period of 72 hours.

## 2025-01-31 NOTE — ASSESSMENT & PLAN NOTE
Reviewed 1/31/25  Follows with medical  Continue famotidine 20 mg tablet BID per medical team   Continue pantoprazole EC 20 mg tablet every morning per medical team  This is a chronic condition, and is stable unless otherwise noted.      No associated orders from this encounter found during lookback period of 72 hours.

## 2025-01-31 NOTE — NURSING NOTE
Patient has been visible on the unit. Social with peers. Admits feeling better since Cobensy had been discontinued.  Medication compliant. Denies suicidal ideations. Admits having auditory hallucinations of them telling him they are going to hurt him or his family. Verbal support given. Attended 2 groups on day shift.

## 2025-01-31 NOTE — NURSING NOTE
Patient has been visible on the unit most of the shift. Pleasant and cooperative. Social with select peers. Plays ping pong with select males peers. Medication compliant. Denies suicidal ideations. Attends most of the groups. Appetite good.

## 2025-01-31 NOTE — ASSESSMENT & PLAN NOTE
Reviewed 1/31/25  on nicotine gum as needed and encouraged smoking cessation  Continue to encourage cessation upon discharge      No associated orders from this encounter found during lookback period of 72 hours.

## 2025-01-31 NOTE — ASSESSMENT & PLAN NOTE
Reviewed 1/31/25  Follows with medical team    No associated orders from this encounter found during lookback period of 72 hours.

## 2025-02-01 PROCEDURE — 99232 SBSQ HOSP IP/OBS MODERATE 35: CPT | Performed by: PHYSICIAN ASSISTANT

## 2025-02-01 RX ADMIN — CLOZAPINE 250 MG: 25 TABLET ORAL at 21:09

## 2025-02-01 RX ADMIN — DOCUSATE SODIUM 100 MG: 100 CAPSULE, LIQUID FILLED ORAL at 09:19

## 2025-02-01 RX ADMIN — PROPRANOLOL HYDROCHLORIDE 10 MG: 10 TABLET ORAL at 09:19

## 2025-02-01 RX ADMIN — MULTIPLE VITAMINS W/ MINERALS TAB 1 TABLET: TAB ORAL at 09:19

## 2025-02-01 RX ADMIN — PROPRANOLOL HYDROCHLORIDE 10 MG: 10 TABLET ORAL at 21:08

## 2025-02-01 RX ADMIN — HALOPERIDOL 5 MG: 5 TABLET ORAL at 09:19

## 2025-02-01 RX ADMIN — NICOTINE POLACRILEX 2 MG: 2 GUM, CHEWING ORAL at 09:19

## 2025-02-01 RX ADMIN — MELATONIN TAB 3 MG 9 MG: 3 TAB at 21:07

## 2025-02-01 RX ADMIN — HALOPERIDOL 5 MG: 5 TABLET ORAL at 17:21

## 2025-02-01 RX ADMIN — NICOTINE POLACRILEX 2 MG: 2 GUM, CHEWING ORAL at 13:29

## 2025-02-01 RX ADMIN — DOCUSATE SODIUM 100 MG: 100 CAPSULE, LIQUID FILLED ORAL at 17:21

## 2025-02-01 RX ADMIN — AMLODIPINE BESYLATE 5 MG: 5 TABLET ORAL at 09:19

## 2025-02-01 RX ADMIN — NICOTINE POLACRILEX 2 MG: 2 GUM, CHEWING ORAL at 17:22

## 2025-02-01 RX ADMIN — HYDROCHLOROTHIAZIDE 12.5 MG: 12.5 TABLET ORAL at 09:19

## 2025-02-01 RX ADMIN — LORATADINE 10 MG: 10 TABLET ORAL at 09:19

## 2025-02-01 RX ADMIN — PANTOPRAZOLE SODIUM 20 MG: 20 TABLET, DELAYED RELEASE ORAL at 09:19

## 2025-02-01 RX ADMIN — ESCITALOPRAM OXALATE 20 MG: 10 TABLET, FILM COATED ORAL at 09:19

## 2025-02-01 RX ADMIN — SENNOSIDES AND DOCUSATE SODIUM 2 TABLET: 50; 8.6 TABLET ORAL at 21:09

## 2025-02-01 NOTE — NURSING NOTE
Alert, withdrawn and visible intermittently for meals. In room most of shift. No SI or HI noted. Pt states he has depression a 3/10, and a moderate amount of anxiety. Routine dose of lexapro administered as ordered. Pt states he continues with auditory hallucinations. Denies pain. Attended coping skills. Refused breakfast, and consumed 100% of lunch. Took all medication without prompting. Maintained on safe precautions without incident. Will continue to monitor progress and recovery program.

## 2025-02-01 NOTE — ASSESSMENT & PLAN NOTE
Reviewed 2/1/25  Follows with medical team and given dietary counseling and need for exercise  No associated orders from this encounter found during lookback period of 72 hours.

## 2025-02-01 NOTE — ASSESSMENT & PLAN NOTE
Reviewed 2/1/25  Follows with medical team on hydrochlorothiazide and Norvasc  No associated orders from this encounter found during lookback period of 72 hours.

## 2025-02-01 NOTE — ASSESSMENT & PLAN NOTE
Reviewed 2/1/25  Follows with medical  Continue famotidine 20 mg tablet BID per medical team   Continue pantoprazole EC 20 mg tablet every morning per medical team  This is a chronic condition, and is stable unless otherwise noted.  No associated orders from this encounter found during lookback period of 72 hours.

## 2025-02-01 NOTE — ASSESSMENT & PLAN NOTE
Reviewed 2/1/25  Pt reports depression and anxiety as well as continued AH of voices threatening himself and family. Reports tiredness, otherwise denies SE of medications. Slept/ate well. Nursing denies any acute behaviors.     Continue medication as follows:  Continue Clozapine 250 mg QHS for psychosis - increased 12/11/24 01/28: , BNP 19, CRP 10  01/21: ANC 3.95,   To consider further careful titration  CBC w/diff and CK every 2 weeks  Increased haldol to 5 mg BID for psychosis   Continue Lexapro 20 mg daily for depression and anxiety  Continue Propanolol 10 mg BID for anxiety   Continue Melatonin 9 mg QHS for sleep  Continue Senna-Docusate sodium one 50 mg tablet QHS for constipation  Continue Zofran 4 mg tablet Q6H PRN for nausea   Continue PRN mouth kote for xerostomia     Pt remains on dual antipsychotic Tx due to failure on monotherapy   .  S/p ECT treatments.  - ACT team referral was made for him living back with his aunt once MA funding comes through from the [facility] and sister will try to reapply  No associated orders from this encounter found during lookback period of 72 hours.

## 2025-02-01 NOTE — NURSING NOTE
Patient has been visible on the unit. Pleasant and cooperative. Social with peers. Continues to have auditory hallucinations telling him to harm him and his family. Offers no other complaints. Helpful on the  unit. Denies suicidal ideations. Attends less then half of the groups. Encouraged to attend more as part of his treatment plan and expectations. Medication compliant.

## 2025-02-01 NOTE — PROGRESS NOTES
This note was not shared with the patient due to reasonable likelihood of causing patient harm    Progress Note - Behavioral Health   Name: Alberto Bright y.o. male I MRN: [id number]  Unit/Bed#: [facility] 112-02 I Date of Admission: 3/29/2024   Date of Service: 2/1/2025 I Hospital Day: 309    Alberto Bright y.o. male MRN: [id number]   Unit/Bed#: [facility] 112-02 Encounter: [id number]        Assessment & Plan  Schizoaffective disorder, bipolar type (HCC)  Reviewed 2/1/25  Pt reports depression and anxiety as well as continued AH of voices threatening himself and family. Reports tiredness, otherwise denies SE of medications. Slept/ate well. Nursing denies any acute behaviors.     Continue medication as follows:  Continue Clozapine 250 mg QHS for psychosis - increased 12/11/24 01/28: , BNP 19, CRP 10  01/21: ANC 3.95,   To consider further careful titration  CBC w/diff and CK every 2 weeks  Increased haldol to 5 mg BID for psychosis   Continue Lexapro 20 mg daily for depression and anxiety  Continue Propanolol 10 mg BID for anxiety   Continue Melatonin 9 mg QHS for sleep  Continue Senna-Docusate sodium one 50 mg tablet QHS for constipation  Continue Zofran 4 mg tablet Q6H PRN for nausea   Continue PRN mouth kote for xerostomia     Pt remains on dual antipsychotic Tx due to failure on monotherapy   .  S/p ECT treatments.  - ACT team referral was made for him living back with his aunt once MA funding comes [facility] and sister will try to reapply  No associated orders from this encounter found during lookback period of 72 hours.    Chronic idiopathic constipation  Reviewed 2/1/25  Follows with medical team  Continue senna-docusate sodium dose per medical team.   Last BM 1/17/25  No associated orders from this encounter found during lookback period of 72 hours.  GERD (gastroesophageal reflux disease)  Reviewed 2/1/25  Follows with medical  Continue famotidine 20 mg tablet BID per  medical team   Continue pantoprazole EC 20 mg tablet every morning per medical team  This is a chronic condition, and is stable unless otherwise noted.  No associated orders from this encounter found during lookback period of 72 hours.  Tobacco abuse  Reviewed 2/1/25  on nicotine gum as needed and encouraged smoking cessation  Continue to encourage cessation upon discharge  No associated orders from this encounter found during lookback period of 72 hours.  Elevated hemoglobin A1c  Reviewed 2/1/25  Follows with medical team  No associated orders from this encounter found during lookback period of 72 hours.  Class 2 obesity in adult  Reviewed 2/1/25  Follows with medical team and given dietary counseling and need for exercise  No associated orders from this encounter found during lookback period of 72 hours.    Primary hypertension  Reviewed 2/1/25  Follows with medical team on hydrochlorothiazide and Norvasc  No associated orders from this encounter found during lookback period of 72 hours.     Current medications:  Current Facility-Administered Medications   Medication Dose Route Frequency Provider Last Rate    acetaminophen  650 mg Oral Q4H PRN Jordan C Holter, DO      acetaminophen  650 mg Oral Q6H PRN HOLLI Lion      aluminum-magnesium hydroxide-simethicone  30 mL Oral Q4H PRN Jordan C Holter, DO      amLODIPine  5 mg Oral Daily HOLLI Lion      Artificial Tears  1 drop Both Eyes Q3H PRN Jordan C Holter, DO      haloperidol lactate  2.5 mg Intramuscular Q4H PRN Max 4/day HOLLI Lion      And    LORazepam  1 mg Intramuscular Q4H PRN Max 4/day HOLIL Lion      And    benztropine  0.5 mg Intramuscular Q4H PRN Max 4/day HOLLI Lion      haloperidol lactate  5 mg Intramuscular Q4H PRN Max 4/day HOLLI Lion      And    LORazepam  2 mg Intramuscular Q4H PRN Max 4/day HOLLI Lion      And    benztropine  1 mg Intramuscular Q4H PRN Max 4/day HOLLI Lion       bisacodyl  10 mg Rectal Daily PRN HOLLI Lion      calcium carbonate  500 mg Oral BID PRN HOLLI Monge      cloZAPine  250 mg Oral HS Bora Rosario MD      hydrOXYzine HCL  50 mg Oral Q6H PRN Max 4/day Jordan C Holter, DO      Or    diphenhydrAMINE  50 mg Intramuscular Q6H PRN Jordan C Holter, DO      hydrOXYzine HCL  50 mg Oral Q6H PRN Max 4/day HOLLI Lion      Or    diphenhydrAMINE  50 mg Intramuscular Q6H PRN HOLLI Lion      diphenhydrAMINE-zinc acetate   Topical BID PRN HOLLI Lion      docusate sodium  100 mg Oral BID Jourdan Dickens, DO      escitalopram  20 mg Oral Daily HOLLI Lion      famotidine  20 mg Oral BID PRN HOLLI Monge      fluticasone  1 spray Each Nare Daily PRN HOLLI Monge      haloperidol  1 mg Oral Q6H PRN HOLLI Lion      haloperidol  2.5 mg Oral Q4H PRN Max 4/day HOLLI Lion      haloperidol  5 mg Oral Q4H PRN Max 4/day HOLLI Lion      haloperidol  5 mg Oral BID Bora Rosario MD      hydroCHLOROthiazide  12.5 mg Oral Daily Pia Mantilla, HOLLI      hydrocortisone   Topical 4x Daily PRN HOLLI Lion      hydrOXYzine HCL  100 mg Oral Q6H PRN Max 4/day Jordan C Holter, DO      Or    LORazepam  2 mg Intramuscular Q6H PRN Jordan C Holter, DO      hydrOXYzine HCL  100 mg Oral Q6H PRN Max 4/day HOLLI Lion      Or    LORazepam  2 mg Intramuscular Q6H PRN HOLLI Lion      hydrOXYzine HCL  25 mg Oral Q6H PRN Max 4/day Jordan C Holter, DO      ibuprofen  600 mg Oral Q8H PRN HOLLI Lion      influenza vaccine  0.5 mL Intramuscular Once Bora Rosario MD      loratadine  10 mg Oral Daily Brina Guillen MD      magnesium hydroxide  30 mL Oral Once Jourdan Dickens, DO      melatonin  3 mg Oral HS PRN Bora Rosario MD      melatonin  9 mg Oral HS Bora Rosario MD      methocarbamol  500 mg Oral Q6H PRN HOLLI Lion      multivitamin-minerals  1 tablet Oral  Daily Eileen Jensen, HOLLI      nicotine polacrilex  2 mg Oral Q4H PRN Bora Rosario MD      OLANZapine  5 mg Oral Q4H PRN Max 3/day [facility] Holter, DO      Or    OLANZapine  2.5 mg Intramuscular Q4H PRN Max 3/day [facility] Holter, DO      OLANZapine  5 mg Oral Q3H PRN Max 3/day [facility] Holter, DO      Or    OLANZapine  5 mg Intramuscular Q3H PRN Max 3/day [facility] Holter, DO      OLANZapine  2.5 mg Oral Q4H PRN Max 6/day [facility] Holter, DO      ondansetron  4 mg Oral Q6H PRN HOLLI Lion      pantoprazole  20 mg Oral QAM [facility] Holter, DO      polyethylene glycol  17 g Oral Daily PRN HOLLI Ghotra      polyethylene glycol  17 g Oral Daily Jourdan Dickens, DO      propranolol  10 mg Oral Q12H KAYLIE HOLLI Lion      saliva substitute  5 spray Mouth/Throat 4x Daily PRN Mary Jo Reich PA-C      senna-docusate sodium  1 tablet Oral Daily PRN [facility] Cresencioter, DO      senna-docusate sodium  2 tablet Oral HS Jourdan Dickens, DO      traZODone  50 mg Oral HS PRN HOLLI Lion      white petrolatum-mineral oil   Topical TID PRN HOLLI Lion         Risks / Benefits of Treatment:    Risks, benefits, and possible side effects of medications explained to patient and patient verbalizes understanding and agreement for treatment.    Patient was seen today for continuation of care, records reviewed and patient was discussed with the charge nurse.  No behavioral outbursts or acute events noted overnight and No significant changes in behaviors or clinical status over the last 24 hours.      Alberto was seen today while on the unit. He is cooperative and appears flat. He reports depression and anxiety as well as continued AH of voices threatening to hurt him and his family. He states these are still distressing. He states he slept and ate well. Nursing denies any acute behaviors. He does report that he is having less SE now that he is off of Coben[employer].      Alberto does appear overtly anxious  "or depressed.    He denies any suicidal ideation, intent or plan at present; denies any homicidal ideation, intent or plan at present.  He reports auditory hallucinations of \"voices threatening him/family\", denies any visual hallucinations, denies any delusions.  He reports tiredness.  No medication changes indicated at this time, continue current medication regimen.    Psychiatric Review of Systems:  Behavior over the last 24 hours: unchanged.   Sleep: sleeping okay throughout the night  Appetite: adequate  Medication side effects: none reported  ROS:reports tiredness    Mental Status Evaluation:    Appearance:  disheveled, wearing pajamas   Behavior:  cooperative, calm, interacts appropriately with this writer   Speech:  normal rate and volume   Mood:  depressed, anxious   Affect:  flat   Thought Process:  goal directed, linear   Associations: intact associations   Thought Content:  no overt delusions, no paranoia noted on exam   Perceptual Disturbances: auditory hallucinations of \"voices threatening him/family\", denies visual hallucinations when asked   Risk Potential: Suicidal ideation - None at present  Homicidal ideation - None at present  Potential for aggression - Not at present   Sensorium:  oriented to person, place, and time/date   Memory:  recent and remote memory grossly intact   Consciousness:  alert and awake   Attention/Concentration: attention span and concentration appear shorter than expected for age   Insight:  age appropriate   Judgment: age appropriate   Gait/Station: normal gait/station   Motor Activity: no abnormal movements     Vital signs in last 24 hours:    Temp:  [97.5 °F (36.4 °C)-98.1 °F (36.7 °C)] 98.1 °F (36.7 °C)  HR:  [71-85] 71  BP: (117-125)/(56-71) 125/71  Resp:  [18] 18  SpO2:  [98 %] 98 %  O2 Device: None (Room air)    Laboratory results: I have personally reviewed all pertinent laboratory/tests results    Discharge Disposition: Discharge disposition and planning remain " ongoing    Counseling / Coordination of Care:    Medication changes reviewed with nursing staff.  Patient's progress reviewed with nursing staff.  Reassurance and supportive therapy provided.  Group attendance encouraged.  Encouraged participation in milieu and group therapy on the unit.    Axel Bowens 02/01/25

## 2025-02-01 NOTE — ASSESSMENT & PLAN NOTE
Reviewed 2/1/25  Follows with medical team  No associated orders from this encounter found during lookback period of 72 hours.

## 2025-02-01 NOTE — ASSESSMENT & PLAN NOTE
Reviewed 2/1/25  on nicotine gum as needed and encouraged smoking cessation  Continue to encourage cessation upon discharge  No associated orders from this encounter found during lookback period of 72 hours.

## 2025-02-01 NOTE — ASSESSMENT & PLAN NOTE
Reviewed 2/1/25  Follows with medical team  Continue senna-docusate sodium dose per medical team.   Last BM 1/17/25  No associated orders from this encounter found during lookback period of 72 hours.

## 2025-02-01 NOTE — PLAN OF CARE
Problem: Alteration in Thoughts and Perception  Goal: Treatment Goal: Gain control of psychotic behaviors/thinking, reduce/eliminate presenting symptoms and demonstrate improved reality functioning upon discharge  Outcome: Progressing  Goal: Agree to be compliant with medication regime, as prescribed and report medication side effects  Description: Interventions:  - Offer appropriate PRN medication and supervise ingestion; conduct AIMS, as needed   Outcome: Progressing  Goal: Recognize dysfunctional thoughts, communicate reality-based thoughts at the time of discharge  Description: Interventions:  - Provide medication and psycho-education to assist patient in compliance and developing insight into his/her illness   Outcome: Progressing     Problem: Ineffective Coping  Goal: Participates in unit activities  Description: Interventions:  - Provide therapeutic environment   - Provide required programming   - Redirect inappropriate behaviors   Outcome: Progressing  Goal: Patient/Family participate in treatment and DC plans  Description: Interventions:  - Provide therapeutic environment  Outcome: Progressing  Goal: Patient/Family verbalizes awareness of resources  Outcome: Progressing     Problem: Depression  Goal: Treatment Goal: Demonstrate behavioral control of depressive symptoms, verbalize feelings of improved mood/affect, and adopt new coping skills prior to discharge  Outcome: Progressing  Goal: Verbalize thoughts and feelings  Description: Interventions:  - Assess and re-assess patient's level of risk   - Engage patient in 1:1 interactions, daily, for a minimum of 15 minutes   - Encourage patient to express feelings, fears, frustrations, hopes   Outcome: Progressing  Goal: Refrain from harming self  Description: Interventions:  - Monitor patient closely, per order   - Supervise medication ingestion, monitor effects and side effects   Outcome: Progressing  Goal: Refrain from isolation  Description:  Interventions:  - Develop a trusting relationship   - Encourage socialization   Outcome: Progressing  Goal: Refrain from self-neglect  Outcome: Progressing     Problem: Anxiety  Goal: Anxiety is at manageable level  Description: Interventions:  - Assess and monitor patient's anxiety level.   - Monitor for signs and symptoms (heart palpitations, chest pain, shortness of breath, headaches, nausea, feeling jumpy, restlessness, irritable, apprehensive).   - Collaborate with interdisciplinary team and initiate plan and interventions as ordered.  - [place] patient to unit/surroundings  - Explain treatment plan  - Encourage participation in care  - Encourage verbalization of concerns/fears  - Identify coping mechanisms  - Assist in developing anxiety-reducing skills  - Administer/offer alternative therapies  - Limit or eliminate stimulants  Outcome: Progressing     Problem: Alteration in Orientation  Goal: Treatment Goal: Demonstrate a reduction of confusion and improved orientation to person, place, time and/or situation upon discharge, according to optimum baseline/ability  Outcome: Progressing  Goal: Interact with staff daily  Description: Interventions:  - Assess and re-assess patient's level of orientation  - Engage patient in 1 on 1 interactions, daily, for a minimum of 15 minutes   - Establish rapport/trust with patient   Outcome: Progressing  Goal: Allow medical examinations, as recommended  Description: Interventions:  - Provide physical/neurological exams and/or referrals, per provider   Outcome: Progressing  Goal: Cooperate with recommended testing/procedures  Description: Interventions:  - Determine need for ancillary testing  - Observe for mental status changes  - Implement falls/precaution protocol   Outcome: Progressing  Goal: Complete daily ADLs, including personal hygiene independently, as able  Description: Interventions:  - Observe, teach, and assist patient with ADLS  Outcome: Progressing     Problem:  DISCHARGE PLANNING  Goal: Discharge to home with appropriate resources  Description: INTERVENTIONS:  - Identify barriers to discharge w/patient and caregiver  - Arrange for needed discharge resources and transportation as appropriate  - Identify discharge learning needs (meds, wound care, etc.)  - Refer to Case Management Department for coordinating discharge planning if the patient needs post-hospital services based on physician/advanced practitioner order or complex needs related to functional status, cognitive ability, or social support system  Outcome: Progressing     Problem: Alteration in Thoughts and Perception  Goal: Refrain from acting on delusional thinking/internal stimuli  Description: Interventions:  - Monitor patient closely, per order   - Utilize least restrictive measures   - Set reasonable limits, give positive feedback for acceptable   - Administer medications as ordered and monitor of potential side effects  Outcome: Not Progressing

## 2025-02-02 PROCEDURE — 99232 SBSQ HOSP IP/OBS MODERATE 35: CPT | Performed by: PHYSICIAN ASSISTANT

## 2025-02-02 RX ADMIN — MULTIPLE VITAMINS W/ MINERALS TAB 1 TABLET: TAB ORAL at 08:27

## 2025-02-02 RX ADMIN — MELATONIN TAB 3 MG 9 MG: 3 TAB at 21:25

## 2025-02-02 RX ADMIN — HALOPERIDOL 5 MG: 5 TABLET ORAL at 08:27

## 2025-02-02 RX ADMIN — PROPRANOLOL HYDROCHLORIDE 10 MG: 10 TABLET ORAL at 08:28

## 2025-02-02 RX ADMIN — ESCITALOPRAM OXALATE 20 MG: 10 TABLET, FILM COATED ORAL at 08:26

## 2025-02-02 RX ADMIN — PROPRANOLOL HYDROCHLORIDE 10 MG: 10 TABLET ORAL at 21:26

## 2025-02-02 RX ADMIN — NICOTINE POLACRILEX 2 MG: 2 GUM, CHEWING ORAL at 12:31

## 2025-02-02 RX ADMIN — DOCUSATE SODIUM 100 MG: 100 CAPSULE, LIQUID FILLED ORAL at 08:28

## 2025-02-02 RX ADMIN — CLOZAPINE 250 MG: 25 TABLET ORAL at 21:27

## 2025-02-02 RX ADMIN — SENNOSIDES AND DOCUSATE SODIUM 2 TABLET: 50; 8.6 TABLET ORAL at 21:25

## 2025-02-02 RX ADMIN — NICOTINE POLACRILEX 2 MG: 2 GUM, CHEWING ORAL at 08:27

## 2025-02-02 RX ADMIN — HYDROCHLOROTHIAZIDE 12.5 MG: 12.5 TABLET ORAL at 08:27

## 2025-02-02 RX ADMIN — HALOPERIDOL 5 MG: 5 TABLET ORAL at 17:27

## 2025-02-02 RX ADMIN — LORATADINE 10 MG: 10 TABLET ORAL at 08:28

## 2025-02-02 RX ADMIN — NICOTINE POLACRILEX 2 MG: 2 GUM, CHEWING ORAL at 21:26

## 2025-02-02 RX ADMIN — DOCUSATE SODIUM 100 MG: 100 CAPSULE, LIQUID FILLED ORAL at 17:27

## 2025-02-02 RX ADMIN — NICOTINE POLACRILEX 2 MG: 2 GUM, CHEWING ORAL at 17:27

## 2025-02-02 RX ADMIN — PANTOPRAZOLE SODIUM 20 MG: 20 TABLET, DELAYED RELEASE ORAL at 08:27

## 2025-02-02 NOTE — ASSESSMENT & PLAN NOTE
"Reviewed 2/2/25  Pt seen in dining room and reported \"stable\" mood. Still admitting to  of voices threatening self and family. Denied SI/HI/VH. Slept/ate well. Nursing denies any acute behaviors.     Continue medication as follows:  Continue Clozapine 250 mg QHS for psychosis - increased 12/11/24 01/28: , BNP 19, CRP 10  01/21: ANC 3.95,   To consider further careful titration  CBC w/diff and CK every 2 weeks  Increased haldol to 5 mg BID for psychosis   Continue Lexapro 20 mg daily for depression and anxiety  Continue Propanolol 10 mg BID for anxiety   Continue Melatonin 9 mg QHS for sleep  Continue Senna-Docusate sodium one 50 mg tablet QHS for constipation  Continue Zofran 4 mg tablet Q6H PRN for nausea   Continue PRN mouth kote for xerostomia     Pt remains on dual antipsychotic Tx due to failure on monotherapy   .  S/p ECT treatments.  - ACT team referral was made for him living back with his aunt once MA funding comes through from the [facility] and sister will try to reapply  No associated orders from this encounter found during lookback period of 72 hours.    "

## 2025-02-02 NOTE — NURSING NOTE
Alert, cooperative and visible intermittently pacing the unit and socializing with selective peers. Consumed 100% of dinner. Took all medication without prompting. Maintained on safe precautions without incident.

## 2025-02-02 NOTE — ASSESSMENT & PLAN NOTE
Reviewed 2/2/25  Follows with medical team  No associated orders from this encounter found during lookback period of 72 hours.

## 2025-02-02 NOTE — ASSESSMENT & PLAN NOTE
Reviewed 2/2/25  Follows with medical team and given dietary counseling and need for exercise  No associated orders from this encounter found during lookback period of 72 hours.

## 2025-02-02 NOTE — ASSESSMENT & PLAN NOTE
Reviewed 2/2/25  on nicotine gum as needed and encouraged smoking cessation  Continue to encourage cessation upon discharge  No associated orders from this encounter found during lookback period of 72 hours.

## 2025-02-02 NOTE — NURSING NOTE
Alert, cooperative and visible intermittently. Socializing with selective peers. No SI or HI noted. Denies pain. Pt states he continues with depression, and anxiety, Pt states his mom is in the hospital with pneumonia. Routine dose of lexapro  administered as ordered. Attended coping skills. Refused breakfast and consumed 100% of lunch. Took all medication without prompting. Maintained on safe precautions without incident. Will continue to monitor progress and recovery program.

## 2025-02-02 NOTE — ASSESSMENT & PLAN NOTE
Reviewed 2/2/25  Follows with medical  Continue famotidine 20 mg tablet BID per medical team   Continue pantoprazole EC 20 mg tablet every morning per medical team  This is a chronic condition, and is stable unless otherwise noted.  No associated orders from this encounter found during lookback period of 72 hours.

## 2025-02-02 NOTE — PROGRESS NOTES
"This note was not shared with the patient due to reasonable likelihood of causing patient harm    Progress Note - Behavioral Health   Name: Alberto Bright y.o. male I MRN: [id number]  Unit/Bed#: [facility] 112-02 I Date of Admission: 3/29/2024   Date of Service: 2/2/2025 I Hospital Day: 310    Alberto Berumen 29 y.o. male MRN: [id number]   Unit/Bed#: [facility] 112-02 Encounter: [id number]        Assessment & Plan  Schizoaffective disorder, bipolar type (HCC)  Reviewed 2/2/25  Pt seen in dining room and reported \"stable\" mood. Still admitting to  of voices threatening self and family. Denied SI/HI/VH. Slept/ate well. Nursing denies any acute behaviors.     Continue medication as follows:  Continue Clozapine 250 mg QHS for psychosis - increased 12/11/24 01/28: , BNP 19, CRP 10  01/21: ANC 3.95,   To consider further careful titration  CBC w/diff and CK every 2 weeks  Increased haldol to 5 mg BID for psychosis   Continue Lexapro 20 mg daily for depression and anxiety  Continue Propanolol 10 mg BID for anxiety   Continue Melatonin 9 mg QHS for sleep  Continue Senna-Docusate sodium one 50 mg tablet QHS for constipation  Continue Zofran 4 mg tablet Q6H PRN for nausea   Continue PRN mouth kote for xerostomia     Pt remains on dual antipsychotic Tx due to failure on monotherapy   .  S/p ECT treatments.  - ACT team referral was made for him living back with his aunt once MA funding comes through from the [facility] and sister will try to reapply  No associated orders from this encounter found during lookback period of 72 hours.    Chronic idiopathic constipation  Reviewed 2/2/25  Follows with medical team  Continue senna-docusate sodium dose per medical team.   Last BM 1/17/25  No associated orders from this encounter found during lookback period of 72 hours.  GERD (gastroesophageal reflux disease)  Reviewed 2/2/25  Follows with medical  Continue famotidine 20 mg tablet BID per medical team   Continue " pantoprazole EC 20 mg tablet every morning per medical team  This is a chronic condition, and is stable unless otherwise noted.  No associated orders from this encounter found during lookback period of 72 hours.  Tobacco abuse  Reviewed 2/2/25  on nicotine gum as needed and encouraged smoking cessation  Continue to encourage cessation upon discharge  No associated orders from this encounter found during lookback period of 72 hours.  Elevated hemoglobin A1c  Reviewed 2/2/25  Follows with medical team  No associated orders from this encounter found during lookback period of 72 hours.  Class 2 obesity in adult  Reviewed 2/2/25  Follows with medical team and given dietary counseling and need for exercise  No associated orders from this encounter found during lookback period of 72 hours.    Primary hypertension  Reviewed 2/2/25  Follows with medical team on hydrochlorothiazide and Norvasc  No associated orders from this encounter found during lookback period of 72 hours.     Current medications:  Current Facility-Administered Medications   Medication Dose Route Frequency Provider Last Rate    acetaminophen  650 mg Oral Q4H PRN Jordan C Holter, DO      acetaminophen  650 mg Oral Q6H PRN HOLLI Lion      aluminum-magnesium hydroxide-simethicone  30 mL Oral Q4H PRN Jordan C Holter, DO      amLODIPine  5 mg Oral Daily HOLLI Lion      Artificial Tears  1 drop Both Eyes Q3H PRN Jordan C Holter, DO      haloperidol lactate  2.5 mg Intramuscular Q4H PRN Max 4/day HOLLI Lion      And    LORazepam  1 mg Intramuscular Q4H PRN Max 4/day HOLLI Lion      And    benztropine  0.5 mg Intramuscular Q4H PRN Max 4/day HOLLI Lion      haloperidol lactate  5 mg Intramuscular Q4H PRN Max 4/day HOLLI Lion      And    LORazepam  2 mg Intramuscular Q4H PRN Max 4/day HOLLI Lion      And    benztropine  1 mg Intramuscular Q4H PRN Max 4/day HOLLI Lion      bisacodyl  10 mg Rectal Daily  PRN HOLLI Lion      calcium carbonate  500 mg Oral BID PRN HOLLI Monge      cloZAPine  250 mg Oral HS Bora Rosario MD      hydrOXYzine HCL  50 mg Oral Q6H PRN Max 4/day Jordan C Holter, DO      Or    diphenhydrAMINE  50 mg Intramuscular Q6H PRN Jordan C Holter, DO      hydrOXYzine HCL  50 mg Oral Q6H PRN Max 4/day HOLLI Lion      Or    diphenhydrAMINE  50 mg Intramuscular Q6H PRN HOLLI Lion      diphenhydrAMINE-zinc acetate   Topical BID PRN HOLLI Lion      docusate sodium  100 mg Oral BID Jourdan Dickens, DO      escitalopram  20 mg Oral Daily HOLLI Lion      famotidine  20 mg Oral BID PRN HOLLI Monge      fluticasone  1 spray Each Nare Daily PRN HOLLI Monge      haloperidol  1 mg Oral Q6H PRN HOLLI Lion      haloperidol  2.5 mg Oral Q4H PRN Max 4/day HOLLI Lion      haloperidol  5 mg Oral Q4H PRN Max 4/day HOLLI Lion      haloperidol  5 mg Oral BID Bora Rosario MD      hydroCHLOROthiazide  12.5 mg Oral Daily HOLLI Galvan      hydrocortisone   Topical 4x Daily PRN HOLLI Lion      hydrOXYzine HCL  100 mg Oral Q6H PRN Max 4/day Jordan C Holter, DO      Or    LORazepam  2 mg Intramuscular Q6H PRN Jordan C Holter, DO      hydrOXYzine HCL  100 mg Oral Q6H PRN Max 4/day HOLLI Lion      Or    LORazepam  2 mg Intramuscular Q6H PRN HOLLI Lion      hydrOXYzine HCL  25 mg Oral Q6H PRN Max 4/day Jordan C Holter, DO      ibuprofen  600 mg Oral Q8H PRN HOLLI Lion      influenza vaccine  0.5 mL Intramuscular Once Bora Rosario MD      loratadine  10 mg Oral Daily Brina Guillen MD      magnesium hydroxide  30 mL Oral Once Jourdan Dickens, DO      melatonin  3 mg Oral HS PRN Bora Rosario MD      melatonin  9 mg Oral HS Bora Rosario MD      methocarbamol  500 mg Oral Q6H PRN HOLLI Lion      multivitamin-minerals  1 tablet Oral Daily Eileen Jensen,  "CRNP      nicotine polacrilex  2 mg Oral Q4H PRN Bora Rosario MD      OLANZapine  5 mg Oral Q4H PRN Max 3/day [facility] Holter, DO      Or    OLANZapine  2.5 mg Intramuscular Q4H PRN Max 3/day [facility] Holter, DO      OLANZapine  5 mg Oral Q3H PRN Max 3/day [facility] Holter, DO      Or    OLANZapine  5 mg Intramuscular Q3H PRN Max 3/day [facility] Holter, DO      OLANZapine  2.5 mg Oral Q4H PRN Max 6/day [facility] Holter, DO      ondansetron  4 mg Oral Q6H PRN HOLLI Lion      pantoprazole  20 mg Oral QAM [facility] Holter, DO      polyethylene glycol  17 g Oral Daily PRN Sofi Yoo, HOLLI      polyethylene glycol  17 g Oral Daily Jourdan Dickens, DO      propranolol  10 mg Oral Q12H KAYLIE HOLLI Lion      saliva substitute  5 spray Mouth/Throat 4x Daily PRN Mary Jo Reich PA-C      senna-docusate sodium  1 tablet Oral Daily PRN [facility] Cresencioter, DO      senna-docusate sodium  2 tablet Oral HS Jourdan Dickens, DO      traZODone  50 mg Oral HS PRN HOLLI Lion      white petrolatum-mineral oil   Topical TID PRN HOLLI Lion         Risks / Benefits of Treatment:    Risks, benefits, and possible side effects of medications explained to patient and patient verbalizes understanding and agreement for treatment.    Patient was seen today for continuation of care, records reviewed and patient was discussed with the charge nurse.  No behavioral outbursts or acute events noted overnight and No significant changes in behaviors or clinical status over the last 24 hours.      Alberto was seen today while in the dining room. He reported his mood as \"stable\" meaning no more depressed or anxious than yesterday. He denied SI/HI/VH. He still is having AH of voices threatening to hurt self/family. He reported eating and sleeping well. Nursing denies acute behaviors and reports he continues to engage on unit.     Alberto does appear overtly anxious or depressed.    He denies any suicidal ideation, intent or plan " "at present; denies any homicidal ideation, intent or plan at present.  He reports auditory hallucinations of \"voices threatening him/family\", denies any visual hallucinations, denies any delusions.  He reports tiredness.  No medication changes indicated at this time, continue current medication regimen.    Psychiatric Review of Systems:  Behavior over the last 24 hours: unchanged.   Sleep: sleeping okay throughout the night  Appetite: adequate  Medication side effects: none reported  ROS:no complaints    Mental Status Evaluation:    Appearance:  disheveled, wearing pajamas   Behavior:  cooperative, calm, interacts appropriately with this writer   Speech:  normal rate and volume   Mood:  still anxious, still depressed   Affect:  flat   Thought Process:  goal directed, linear   Associations: intact associations   Thought Content:  no overt delusions, no paranoia noted on exam   Perceptual Disturbances: auditory hallucinations of \"voices threatening him/family\", denies visual hallucinations when asked   Risk Potential: Suicidal ideation - None at present  Homicidal ideation - None at present  Potential for aggression - Not at present   Sensorium:  oriented to person, place, and time/date   Memory:  recent and remote memory grossly intact   Consciousness:  alert and awake   Attention/Concentration: attention span and concentration appear shorter than expected for age   Insight:  age appropriate   Judgment: age appropriate   Gait/Station: normal gait/station   Motor Activity: no abnormal movements     Vital signs in last 24 hours:    Temp:  [97.3 °F (36.3 °C)-97.7 °F (36.5 °C)] 97.3 °F (36.3 °C)  HR:  [69-85] 78  BP: (108-119)/(62-69) 115/69  Resp:  [18] 18  SpO2:  [96 %-100 %] 100 %  O2 Device: None (Room air)    Laboratory results: I have personally reviewed all pertinent laboratory/tests results    Discharge Disposition: Discharge disposition and planning remain ongoing    Counseling / Coordination of " Care:    Medication changes reviewed with nursing staff.  Patient's progress reviewed with nursing staff.  Reassurance and supportive therapy provided.  Group attendance encouraged.  Encouraged participation in milieu and group therapy on the unit.    Axel Bowens 02/02/25

## 2025-02-02 NOTE — ASSESSMENT & PLAN NOTE
Reviewed 2/2/25  Follows with medical team on hydrochlorothiazide and Norvasc  No associated orders from this encounter found during lookback period of 72 hours.

## 2025-02-02 NOTE — PLAN OF CARE
Problem: Alteration in Thoughts and Perception  Goal: Agree to be compliant with medication regime, as prescribed and report medication side effects  Description: Interventions:  - Offer appropriate PRN medication and supervise ingestion; conduct AIMS, as needed   Outcome: Progressing     Problem: Ineffective Coping  Goal: Demonstrates healthy coping skills  Outcome: Progressing  Goal: Participates in unit activities  Description: Interventions:  - Provide therapeutic environment   - Provide required programming   - Redirect inappropriate behaviors   Outcome: Progressing     Problem: Depression  Goal: Verbalize thoughts and feelings  Description: Interventions:  - Assess and re-assess patient's level of risk   - Engage patient in 1:1 interactions, daily, for a minimum of 15 minutes   - Encourage patient to express feelings, fears, frustrations, hopes   Outcome: Progressing  Goal: Refrain from harming self  Description: Interventions:  - Monitor patient closely, per order   - Supervise medication ingestion, monitor effects and side effects   Outcome: Progressing  Goal: Refrain from isolation  Description: Interventions:  - Develop a trusting relationship   - Encourage socialization   Outcome: Progressing  Goal: Refrain from self-neglect  Outcome: Progressing     Problem: Anxiety  Goal: Anxiety is at manageable level  Description: Interventions:  - Assess and monitor patient's anxiety level.   - Monitor for signs and symptoms (heart palpitations, chest pain, shortness of breath, headaches, nausea, feeling jumpy, restlessness, irritable, apprehensive).   - Collaborate with interdisciplinary team and initiate plan and interventions as ordered.  - [place] patient to unit/surroundings  - Explain treatment plan  - Encourage participation in care  - Encourage verbalization of concerns/fears  - Identify coping mechanisms  - Assist in developing anxiety-reducing skills  - Administer/offer alternative therapies  - Limit or  eliminate stimulants  Outcome: Progressing     Problem: Alteration in Orientation  Goal: Interact with staff daily  Description: Interventions:  - Assess and re-assess patient's level of orientation  - Engage patient in 1 on 1 interactions, daily, for a minimum of 15 minutes   - Establish rapport/trust with patient   Outcome: Progressing  Goal: Allow medical examinations, as recommended  Description: Interventions:  - Provide physical/neurological exams and/or referrals, per provider   Outcome: Progressing     Problem: Alteration in Thoughts and Perception  Goal: Treatment Goal: Gain control of psychotic behaviors/thinking, reduce/eliminate presenting symptoms and demonstrate improved reality functioning upon discharge  Outcome: Not Progressing  Goal: Refrain from acting on delusional thinking/internal stimuli  Description: Interventions:  - Monitor patient closely, per order   - Utilize least restrictive measures   - Set reasonable limits, give positive feedback for acceptable   - Administer medications as ordered and monitor of potential side effects  Outcome: Not Progressing  Goal: Recognize dysfunctional thoughts, communicate reality-based thoughts at the time of discharge  Description: Interventions:  - Provide medication and psycho-education to assist patient in compliance and developing insight into his/her illness   Outcome: Not Progressing     Problem: Alteration in Orientation  Goal: Treatment Goal: Demonstrate a reduction of confusion and improved orientation to person, place, time and/or situation upon discharge, according to optimum baseline/ability  Outcome: Not Progressing

## 2025-02-02 NOTE — NURSING NOTE
Patient visible  in the dining room . Keeping to himself. Appears sad . Isolative to self but visible. Medication compliant Behaviors controlled and appropriate. Less interactive than usual.

## 2025-02-02 NOTE — ASSESSMENT & PLAN NOTE
Reviewed 2/2/25  Follows with medical team  Continue senna-docusate sodium dose per medical team.   Last BM 1/17/25  No associated orders from this encounter found during lookback period of 72 hours.

## 2025-02-02 NOTE — NURSING NOTE
Weekly wellness assessment completed. Pt lung sounds clear in all lung fields. Trace edema noted to b/l lower ext. Bowel sounds +x4. B/l pedal pulses papable. Skin intact,except dryness noted to b/l feet. Skin warm and color within normal limits for patient.

## 2025-02-03 PROCEDURE — 99232 SBSQ HOSP IP/OBS MODERATE 35: CPT | Performed by: PSYCHIATRY & NEUROLOGY

## 2025-02-03 RX ADMIN — DOCUSATE SODIUM 100 MG: 100 CAPSULE, LIQUID FILLED ORAL at 08:43

## 2025-02-03 RX ADMIN — HALOPERIDOL 5 MG: 5 TABLET ORAL at 17:26

## 2025-02-03 RX ADMIN — SENNOSIDES AND DOCUSATE SODIUM 2 TABLET: 50; 8.6 TABLET ORAL at 21:15

## 2025-02-03 RX ADMIN — HYDROCHLOROTHIAZIDE 12.5 MG: 12.5 TABLET ORAL at 08:41

## 2025-02-03 RX ADMIN — CLOZAPINE 250 MG: 25 TABLET ORAL at 21:14

## 2025-02-03 RX ADMIN — PROPRANOLOL HYDROCHLORIDE 10 MG: 10 TABLET ORAL at 21:15

## 2025-02-03 RX ADMIN — ESCITALOPRAM OXALATE 20 MG: 10 TABLET, FILM COATED ORAL at 08:43

## 2025-02-03 RX ADMIN — DOCUSATE SODIUM 100 MG: 100 CAPSULE, LIQUID FILLED ORAL at 17:25

## 2025-02-03 RX ADMIN — NICOTINE POLACRILEX 2 MG: 2 GUM, CHEWING ORAL at 08:41

## 2025-02-03 RX ADMIN — MELATONIN TAB 3 MG 9 MG: 3 TAB at 21:15

## 2025-02-03 RX ADMIN — HALOPERIDOL 5 MG: 5 TABLET ORAL at 08:42

## 2025-02-03 RX ADMIN — NICOTINE POLACRILEX 2 MG: 2 GUM, CHEWING ORAL at 17:26

## 2025-02-03 RX ADMIN — MULTIPLE VITAMINS W/ MINERALS TAB 1 TABLET: TAB ORAL at 08:42

## 2025-02-03 RX ADMIN — LORATADINE 10 MG: 10 TABLET ORAL at 08:42

## 2025-02-03 RX ADMIN — PANTOPRAZOLE SODIUM 20 MG: 20 TABLET, DELAYED RELEASE ORAL at 08:41

## 2025-02-03 RX ADMIN — NICOTINE POLACRILEX 2 MG: 2 GUM, CHEWING ORAL at 12:52

## 2025-02-03 RX ADMIN — NICOTINE POLACRILEX 2 MG: 2 GUM, CHEWING ORAL at 21:15

## 2025-02-03 RX ADMIN — PROPRANOLOL HYDROCHLORIDE 10 MG: 10 TABLET ORAL at 08:41

## 2025-02-03 NOTE — PROGRESS NOTES
"Progress Note - Behavioral Health   Name: Alberto Berumen 29 y.o. male I MRN: [id number]  Unit/Bed#: [facility] 112-02 I Date of Admission: 3/29/2024   Date of Service: 2/3/2025 I Hospital Day: 311     Assessment & Plan  Schizoaffective disorder, bipolar type (HCC)  Reviewed 2/3/25  Pt seen in room and reported \"alright\" mood. Still admitting to  of voices threatening self and family. Denied SI/HI/VH. Slept/ate well. Nursing denies any acute behaviors.     Continue medication as follows:  Continue Clozapine 250 mg QHS for psychosis - increased 12/11/24 01/28: , BNP 19, CRP 10  01/21: ANC 3.95,   To consider further careful titration  CBC w/diff and CK every 2 weeks  Continue haldol to 5 mg BID for psychosis   Continue Lexapro 20 mg daily for depression and anxiety  Continue Propanolol 10 mg BID for anxiety   Continue Melatonin 9 mg QHS for sleep  Continue Senna-Docusate sodium one 50 mg tablet QHS for constipation  Continue Zofran 4 mg tablet Q6H PRN for nausea   Continue PRN mouth kote for xerostomia     - Pt remains on dual antipsychotic Tx due to failure on monotherapy   .  - S/p ECT treatments.  - ACT team referral was made for him living back with his aunt once MA funding comes through from the [facility] and sister will try to reapply  No associated orders from this encounter found during lookback period of 72 hours.    Chronic idiopathic constipation  Reviewed 2/3/25  Follows with medical team  Continue senna-docusate sodium dose per medical team.   Continue with regular bowel movements  No associated orders from this encounter found during lookback period of 72 hours.  GERD (gastroesophageal reflux disease)  Reviewed 2/3/25  Follows with medical  Continue famotidine 20 mg tablet BID per medical team   Continue pantoprazole EC 20 mg tablet every morning per medical team  This is a chronic condition, and is stable unless otherwise noted.  No associated orders from this encounter found " "during lookback period of 72 hours.  Tobacco abuse  Reviewed 2/3/25  on nicotine gum as needed and encouraged smoking cessation  Continue to encourage cessation upon discharge  No associated orders from this encounter found during lookback period of 72 hours.  Elevated hemoglobin A1c  Reviewed 2/3/25  Follows with medical team  No associated orders from this encounter found during lookback period of 72 hours.  Class 2 obesity in adult  Reviewed 2/3/25  Follows with medical team and given dietary counseling and need for exercise  No associated orders from this encounter found during lookback period of 72 hours.    Primary hypertension  Reviewed 2/3/25  Follows with medical team on hydrochlorothiazide and Norvasc  No associated orders from this encounter found during lookback period of 72 hours.    Plan:  Continue prescribed psychotropic medication regimen  Currently on 201 commitment status  Continue to promote patient participation in therapeutic milieu.  Continue medical management per medicine.  Continue behavioral health checks q.15 minutes.   Continue vitals per behavioral health unit protocol.  Safety: Safety and communication plan established to target dynamic risk factors discussed above.   Discharge and disposition: At this time there are ongoing plans for discharge to the patient's aunt house following coordination of ACT services.    SUBJECTIVE     Today, Alberto reports feeling \"alright.\"    Alberto reports feeling largely \"the same.\" At the time of treatment team this morning, he expressed that he felt no significant change on haldol (currently 5 mg BID). At the time of interview he reports that he continues to experience ongoing auditory hallucinations of male and female voices that are derogatory in nature. He states he feels paranoid in this context and would not feel safe if he left the hospital in present state.    Alberto reports that the weekend was uneventful. He reports that on the unit this " weekend he spent time journaling.     At this time of interview Alberto voices no acute issues or concerns. He reports sleeping well and eating well. He denies side effects to his psychiatric medications. He reports going to the bathroom without difficulty.    Alberto continues to report feeling tired in the morning. He states he plans to attend groups throughout the day.    Alberto denies suicidal ideation, homicidal ideation, and visual hallucinations.      PSYCHIATRIC REVIEW OF SYSTEMS     Behavior over the last 24 hours: unchanged  Sleep: normal  Appetite: normal  Medication side effects: None    Progress Toward Goals: Limited progress, as evidenced by their participation in individual, social and therapeutic milieu in addition to adherence to their medication regimen.  Patient Acceptance: Fair  Patient Understanding: Fair  Patient Involvement in Reintegration Process: Remains in contact with his mother, sister, and aunt on the phone. Pt. mother is in hospital for illness.   Patient's Therapeutic Relationship with Psychiatrist: Trusting  Patient's Optimism Regarding Recovery: At this time there are ongoing plans for discharge to the patient's aunts house following coordination of ACT services. Patient is in agreement with this plan.  Group Attendance: 4/10    REVIEW OF SYSTEMS   Review of systems: no complaints reported     OBJECTIVE     Vital Signs in Past 24 Hours:  Temp:  [97.9 °F (36.6 °C)] 97.9 °F (36.6 °C)  HR:  [80-86] 85  BP: (112-136)/(64-74) 112/68  Resp:  [18] 18  SpO2:  [98 %] 98 %  O2 Device: None (Room air)    Intake/Output in Past 24 hours:  No intake/output data recorded.  No intake/output data recorded.        Laboratory Results:  I have personally reviewed all pertinent laboratory/tests results.  Most Recent Labs:   Lab Results   Component Value Date    WBC 8.93 01/21/2025    RBC 5.53 01/21/2025    HGB 12.8 01/21/2025    HCT 42.7 01/21/2025     01/21/2025    RDW 14.0 01/21/2025     NEUTROABS 3.95 01/21/2025    SODIUM 140 10/16/2024    K 3.8 10/16/2024     10/16/2024    CO2 29 10/16/2024    BUN 9 10/16/2024    CREATININE 0.91 10/16/2024    GLUC 94 10/16/2024    GLUF 94 10/16/2024    CALCIUM 9.5 10/16/2024    AST 26 09/30/2024    ALT 42 09/30/2024    ALKPHOS 64 09/30/2024    TP 6.6 09/30/2024    ALB 3.8 09/30/2024    TBILI 0.47 09/30/2024    CHOLESTEROL 156 03/14/2024    HDL 44 03/14/2024    TRIG 133 03/14/2024    LDLCALC 85 03/14/2024    NONHDLC 112 03/14/2024    LITHIUM 0.61 01/09/2024    [id number] 1.062 11/15/2023    HGBA1C 5.0 04/01/2024    EAG 97 04/01/2024       Behavioral Health Medications: all current active meds have been reviewed.    Current Facility-Administered Medications   Medication Dose Route Frequency Provider Last Rate    acetaminophen  650 mg Oral Q4H PRN Jordan C Holter, DO      acetaminophen  650 mg Oral Q6H PRN HOLLI Lion      aluminum-magnesium hydroxide-simethicone  30 mL Oral Q4H PRN Jordan C Holter, DO      amLODIPine  5 mg Oral Daily HOLLI Lion      Artificial Tears  1 drop Both Eyes Q3H PRN Jordan C Holter, DO      haloperidol lactate  2.5 mg Intramuscular Q4H PRN Max 4/day HOLLI Lion      And    LORazepam  1 mg Intramuscular Q4H PRN Max 4/day HOLLI Lion      And    benztropine  0.5 mg Intramuscular Q4H PRN Max 4/day HOLLI Lion      haloperidol lactate  5 mg Intramuscular Q4H PRN Max 4/day HOLLI Lion      And    LORazepam  2 mg Intramuscular Q4H PRN Max 4/day HOLLI Lion      And    benztropine  1 mg Intramuscular Q4H PRN Max 4/day HOLLI Lion      bisacodyl  10 mg Rectal Daily PRN HOLLI Lion      calcium carbonate  500 mg Oral BID PRN HOLLI Monge      cloZAPine  250 mg Oral HS Bora Rosario MD      hydrOXYzine HCL  50 mg Oral Q6H PRN Max 4/day Jordan C Holter, DO      Or    diphenhydrAMINE  50 mg Intramuscular Q6H PRN Jordan C Holter, DO      hydrOXYzine HCL  50 mg Oral  Q6H PRN Max 4/day HOLLI Lion      Or    diphenhydrAMINE  50 mg Intramuscular Q6H PRN HOLLI Lion      diphenhydrAMINE-zinc acetate   Topical BID PRN HOLLI Lion      docusate sodium  100 mg Oral BID Jourdan Dickens, DO      escitalopram  20 mg Oral Daily HOLLI Lion      famotidine  20 mg Oral BID PRN HOLLI Monge      fluticasone  1 spray Each Nare Daily PRN HOLLI Monge      haloperidol  1 mg Oral Q6H PRN HOLLI Lion      haloperidol  2.5 mg Oral Q4H PRN Max 4/day HOLLI Lion      haloperidol  5 mg Oral Q4H PRN Max 4/day HOLLI Lion      haloperidol  5 mg Oral BID Bora Rosario MD      hydroCHLOROthiazide  12.5 mg Oral Daily Pia Mantilla, HOLLI      hydrocortisone   Topical 4x Daily PRN HOLLI Lion      hydrOXYzine HCL  100 mg Oral Q6H PRN Max 4/day [facility] Holter, DO      Or    LORazepam  2 mg Intramuscular Q6H PRN [facility] Holter, DO      hydrOXYzine HCL  100 mg Oral Q6H PRN Max 4/day HOLLI Lion      Or    LORazepam  2 mg Intramuscular Q6H PRN HOLLI Lion      hydrOXYzine HCL  25 mg Oral Q6H PRN Max 4/day Ion  Holter, DO      ibuprofen  600 mg Oral Q8H PRN HOLLI Lion      influenza vaccine  0.5 mL Intramuscular Once Bora Rosario MD      loratadine  10 mg Oral Daily Brina Guillen MD      magnesium hydroxide  30 mL Oral Once Jourdan Dickens, DO      melatonin  3 mg Oral HS PRN Bora Rosario MD      melatonin  9 mg Oral HS Bora Rosario MD      methocarbamol  500 mg Oral Q6H PRN HOLLI Lion      multivitamin-minerals  1 tablet Oral Daily HOLLI Monge      nicotine polacrilex  2 mg Oral Q4H PRN Bora Rosario MD      OLANZapine  5 mg Oral Q4H PRN Max 3/day Ion C Holter, DO      Or    OLANZapine  2.5 mg Intramuscular Q4H PRN Max 3/day Ion C Holter, DO      OLANZapine  5 mg Oral Q3H PRN Max 3/day Ion FISCHER Holter, DO      Or    OLANZapine  5 mg Intramuscular Q3H PRN  "Max 3/day Jordan C Holter, DO      OLANZapine  2.5 mg Oral Q4H PRN Max 6/day Ion Dupontter, DO      ondansetron  4 mg Oral Q6H PRN HOLLI Lion      pantoprazole  20 mg Oral QAM Ion Dupontter, DO      polyethylene glycol  17 g Oral Daily PRN Sofi Yoo, HOLLI      polyethylene glycol  17 g Oral Daily Jourdan Dickens, DO      propranolol  10 mg Oral Q12H KAYLIE HOLLI Lion      saliva substitute  5 spray Mouth/Throat 4x Daily PRN Mary Jo Reich PA-C      senna-docusate sodium  1 tablet Oral Daily PRN Jordan C Holter, DO      senna-docusate sodium  2 tablet Oral HS Jourdan Dickens, DO      traZODone  50 mg Oral HS PRN HOLLI Lion      white petrolatum-mineral oil   Topical TID PRN HOLLI Lion         Risks, benefits and possible side effects of Medications:   Risks, benefits, and possible side effects of medications explained to patient and patient verbalizes understanding.      Dual Antipsychotic Rationale: At this time patient is on two antipsychotics due to multiple failed medication trials and inability to control symptoms on a singular antipsychotic agent     Mental Status Evaluation:  Appearance:  Alert, appears state age, casually dressed, fair grooming and hygiene, fair eye contact, no acute distress   Behavior:  Calm, polite, cooperative   Speech:  Normal rate, normal volume, fluent, clear, coherent   Mood:  \"Alright\"   Affect:  Constricted, depressed   Thought Process:  Logical, linear, concrete   Associations: concrete associations   Thought Content:  Remains with paranoid ideation in the setting of auditory hallucinations, feels unsafe outside hospital.    Perceptual Disturbances: At the time of interview he reports that he continues to experience ongoing auditory hallucinations of male and female voices that are derogatory in nature. Denies visual hallucinations. Denied command auditory hallucinations. Does not appear responding to internal stimuli.   Risk Potential: " Suicidal ideation - - denies  Homicidal ideation - - denies  Potential for aggression - - not at present  Chronic psychiatric/psychotic symptoms    Sensorium:  oriented to person, place, and time/date   Memory:  recent and remote memory grossly intact   Consciousness:  alert and awake   Attention/Concentration: attention span and concentration appear shorter than expected for age   Insight:  fair   Judgment: limited   Gait/Station: in bed   Motor Activity: no abnormal movements     Recommended Treatment:   See above for assessment and plan.    Counseling/Coordination of Care    Total unit time spent today was greater than 15 minutes. Greater than 50% of total time was spent with the patient and/or patient's relatives and/or coordination of patient's care.     Patient's rights, confidentiality, exceptions to confidentiality, use of electronic medical record including appropriate staff access to medical record regarding behavioral health services and consent to treatment were reviewed.    Note Share:     This note was not shared with the patient due to reasonable likelihood of causing patient harm     Sharon Pierre   Psychiatry, PA-S

## 2025-02-03 NOTE — ASSESSMENT & PLAN NOTE
Reviewed 2/3/25  Follows with medical team on hydrochlorothiazide and Norvasc  No associated orders from this encounter found during lookback period of 72 hours.

## 2025-02-03 NOTE — PROGRESS NOTES
02/03/25 0819   Team Meeting   Meeting Type Daily Rounds   Team Members Present   Team Members Present Physician;Nurse;;Other (Discipline and Name)   Physician Team Member Abraham   Nursing Team Member Mohsen   Social Work Team Member Jose J ORTEGA   Other (Discipline and Name) Wang PCM   Patient/Family Present   Patient Present No   Patient's Family Present No     Groups Participation  4/10.   Patient's compliant with medications. Minimal engagement in treatment. He's depressed and has AH. [facility] funding services to d/c home to family. Patient's mother's in the hospital for illness.

## 2025-02-03 NOTE — ASSESSMENT & PLAN NOTE
Reviewed 2/3/25  Follows with medical  Continue famotidine 20 mg tablet BID per medical team   Continue pantoprazole EC 20 mg tablet every morning per medical team  This is a chronic condition, and is stable unless otherwise noted.  No associated orders from this encounter found during lookback period of 72 hours.

## 2025-02-03 NOTE — PROGRESS NOTES
02/03/25 1038   Team Meeting   Meeting Type Tx Team Meeting   Initial Conference Date 02/03/25   Next Conference Date 02/17/25   Team Members Present   Team Members Present Physician;Nurse;;Other (Discipline and Name)   Physician Team Member Abraham   Nursing Team Member Mohsen   Social Work Team Member Jose J ORTEGA   Other (Discipline and Name) Gaby   Patient/Family Present   Patient Present Yes   Patient's Family Present No   OTHER   Team Meeting - Additional Comments Patient attended his treatment team meeting and shared his completed self-assessment. Patient reports he continues to be depressed. The team discussed the discontinuation of Cobenfy and starting of Haldol. Patient reports he has not had any improvements with the new medication. [place] meeting this week to [facility] spots with the ACT teams.

## 2025-02-03 NOTE — ASSESSMENT & PLAN NOTE
Reviewed 2/3/25  on nicotine gum as needed and encouraged smoking cessation  Continue to encourage cessation upon discharge  No associated orders from this encounter found during lookback period of 72 hours.

## 2025-02-03 NOTE — NURSING NOTE
Pt is calm, cooperative and visible on milieu. Pt reports AH, depression and anxiety; denies SI/HI/VH. Pt reports he is more tired since starting Haldol. Compliant with medications and meals. Social with select peers and attends groups. Q 15 min checks maintained.

## 2025-02-03 NOTE — ASSESSMENT & PLAN NOTE
Reviewed 2/3/25  Follows with medical team  Continue senna-docusate sodium dose per medical team.   Continue with regular bowel movements  No associated orders from this encounter found during lookback period of 72 hours.

## 2025-02-03 NOTE — ASSESSMENT & PLAN NOTE
Reviewed 2/3/25  Follows with medical team and given dietary counseling and need for exercise  No associated orders from this encounter found during lookback period of 72 hours.

## 2025-02-03 NOTE — ASSESSMENT & PLAN NOTE
"Reviewed 2/3/25  Pt seen in room and reported \"alright\" mood. Still admitting to  of voices threatening self and family. Denied SI/HI/VH. Slept/ate well. Nursing denies any acute behaviors.     Continue medication as follows:  Continue Clozapine 250 mg QHS for psychosis - increased 12/11/24 01/28: , BNP 19, CRP 10  01/21: ANC 3.95,   To consider further careful titration  CBC w/diff and CK every 2 weeks  Continue haldol to 5 mg BID for psychosis   Continue Lexapro 20 mg daily for depression and anxiety  Continue Propanolol 10 mg BID for anxiety   Continue Melatonin 9 mg QHS for sleep  Continue Senna-Docusate sodium one 50 mg tablet QHS for constipation  Continue Zofran 4 mg tablet Q6H PRN for nausea   Continue PRN mouth kote for xerostomia     - Pt remains on dual antipsychotic Tx due to failure on monotherapy   .  - S/p ECT treatments.  - ACT team referral was made for him living back with his aunt once MA funding comes through from the [facility] and sister will try to reapply  No associated orders from this encounter found during lookback period of 72 hours.    "

## 2025-02-03 NOTE — NURSING NOTE
Patient visible all evening, mostly sitting in the dining room with peers. Cooperative with staff and pleasant upon approach. Behaviors controlled and appropriate. Offers no complaints. No prn medication requested or required. Currently denies all symptoms.

## 2025-02-03 NOTE — ASSESSMENT & PLAN NOTE
Reviewed 2/3/25  Follows with medical team  No associated orders from this encounter found during lookback period of 72 hours.

## 2025-02-03 NOTE — PLAN OF CARE
Problem: Alteration in Thoughts and Perception  Goal: Agree to be compliant with medication regime, as prescribed and report medication side effects  Description: Interventions:  - Offer appropriate PRN medication and supervise ingestion; conduct AIMS, as needed   Outcome: Progressing     Problem: Depression  Goal: Refrain from self-neglect  Outcome: Progressing     Problem: Anxiety  Goal: Anxiety is at manageable level  Description: Interventions:  - Assess and monitor patient's anxiety level.   - Monitor for signs and symptoms (heart palpitations, chest pain, shortness of breath, headaches, nausea, feeling jumpy, restlessness, irritable, apprehensive).   - Collaborate with interdisciplinary team and initiate plan and interventions as ordered.  - Merrick patient to unit/surroundings  - Explain treatment plan  - Encourage participation in care  - Encourage verbalization of concerns/fears  - Identify coping mechanisms  - Assist in developing anxiety-reducing skills  - Administer/offer alternative therapies  - Limit or eliminate stimulants  Outcome: Progressing     Problem: Alteration in Orientation  Goal: Treatment Goal: Demonstrate a reduction of confusion and improved orientation to person, place, time and/or situation upon discharge, according to optimum baseline/ability  Outcome: Progressing  Goal: Interact with staff daily  Description: Interventions:  - Assess and re-assess patient's level of orientation  - Engage patient in 1 on 1 interactions, daily, for a minimum of 15 minutes   - Establish rapport/trust with patient   Outcome: Progressing  Goal: Express concerns related to confused thinking related to:  Description: Interventions:  - Encourage patient to express feelings, fears, frustrations, hopes  - Assign consistent caregivers   - Merrick/re-orient patient as needed  - Allow comfort items, as appropriate  - Provide visual cues, signs, etc.   Outcome: Progressing  Goal: Allow medical examinations, as  recommended  Description: Interventions:  - Provide physical/neurological exams and/or referrals, per provider   Outcome: Progressing  Goal: Cooperate with recommended testing/procedures  Description: Interventions:  - Determine need for ancillary testing  - Observe for mental status changes  - Implement falls/precaution protocol   Outcome: Progressing  Goal: Complete daily ADLs, including personal hygiene independently, as able  Description: Interventions:  - Observe, teach, and assist patient with ADLS  Outcome: Progressing     Problem: Alteration in Thoughts and Perception  Goal: Treatment Goal: Gain control of psychotic behaviors/thinking, reduce/eliminate presenting symptoms and demonstrate improved reality functioning upon discharge  Outcome: Not Progressing

## 2025-02-03 NOTE — PROGRESS NOTES
02/03/25 1042   Team Meeting   Meeting Type Tx Team Meeting   Initial Conference Date 02/03/25   Next Conference Date 03/05/25   Team Members Present   Team Members Present Physician;Nurse;   Physician Team Member Abraham   Nursing Team Member Mohsen   Social Work Team Member Jose J ORTEGA   Patient/Family Present   Patient Present Yes   Patient's Family Present No     Treatment team reviewed updated treatment plan with patient. Patient signed his treatment plan.

## 2025-02-04 LAB
BASOPHILS # BLD AUTO: 0.07 THOUSANDS/ÂΜL (ref 0–0.1)
BASOPHILS NFR BLD AUTO: 1 % (ref 0–1)
BNP SERPL-MCNC: 10 PG/ML (ref 0–100)
CK SERPL-CCNC: 141 U/L (ref 39–308)
CRP SERPL QL: 9.5 MG/L
EOSINOPHIL # BLD AUTO: 0.39 THOUSAND/ÂΜL (ref 0–0.61)
EOSINOPHIL NFR BLD AUTO: 4 % (ref 0–6)
ERYTHROCYTE [DISTWIDTH] IN BLOOD BY AUTOMATED COUNT: 14.1 % (ref 11.6–15.1)
HCT VFR BLD AUTO: 43.5 % (ref 36.5–49.3)
HGB BLD-MCNC: 12.9 G/DL (ref 12–17)
IMM GRANULOCYTES # BLD AUTO: 0.03 THOUSAND/UL (ref 0–0.2)
IMM GRANULOCYTES NFR BLD AUTO: 0 % (ref 0–2)
LYMPHOCYTES # BLD AUTO: 4 THOUSANDS/ÂΜL (ref 0.6–4.47)
LYMPHOCYTES NFR BLD AUTO: 41 % (ref 14–44)
MCH RBC QN AUTO: 23.3 PG (ref 26.8–34.3)
MCHC RBC AUTO-ENTMCNC: 29.7 G/DL (ref 31.4–37.4)
MCV RBC AUTO: 79 FL (ref 82–98)
MONOCYTES # BLD AUTO: 0.83 THOUSAND/ÂΜL (ref 0.17–1.22)
MONOCYTES NFR BLD AUTO: 9 % (ref 4–12)
NEUTROPHILS # BLD AUTO: 4.42 THOUSANDS/ÂΜL (ref 1.85–7.62)
NEUTS SEG NFR BLD AUTO: 45 % (ref 43–75)
NRBC BLD AUTO-RTO: 0 /100 WBCS
PLATELET # BLD AUTO: 246 THOUSANDS/UL (ref 149–390)
PMV BLD AUTO: 9.8 FL (ref 8.9–12.7)
RBC # BLD AUTO: 5.54 MILLION/UL (ref 3.88–5.62)
WBC # BLD AUTO: 9.74 THOUSAND/UL (ref 4.31–10.16)

## 2025-02-04 PROCEDURE — 99232 SBSQ HOSP IP/OBS MODERATE 35: CPT | Performed by: PSYCHIATRY & NEUROLOGY

## 2025-02-04 PROCEDURE — 83880 ASSAY OF NATRIURETIC PEPTIDE: CPT | Performed by: PSYCHIATRY & NEUROLOGY

## 2025-02-04 PROCEDURE — 82550 ASSAY OF CK (CPK): CPT | Performed by: PSYCHIATRY & NEUROLOGY

## 2025-02-04 PROCEDURE — 85025 COMPLETE CBC W/AUTO DIFF WBC: CPT | Performed by: PSYCHIATRY & NEUROLOGY

## 2025-02-04 PROCEDURE — 86140 C-REACTIVE PROTEIN: CPT | Performed by: PSYCHIATRY & NEUROLOGY

## 2025-02-04 RX ADMIN — HYDROCHLOROTHIAZIDE 12.5 MG: 12.5 TABLET ORAL at 08:29

## 2025-02-04 RX ADMIN — HALOPERIDOL 5 MG: 5 TABLET ORAL at 17:48

## 2025-02-04 RX ADMIN — PROPRANOLOL HYDROCHLORIDE 10 MG: 10 TABLET ORAL at 21:03

## 2025-02-04 RX ADMIN — NICOTINE POLACRILEX 2 MG: 2 GUM, CHEWING ORAL at 12:42

## 2025-02-04 RX ADMIN — PROPRANOLOL HYDROCHLORIDE 10 MG: 10 TABLET ORAL at 08:28

## 2025-02-04 RX ADMIN — HALOPERIDOL 5 MG: 5 TABLET ORAL at 08:28

## 2025-02-04 RX ADMIN — SENNOSIDES AND DOCUSATE SODIUM 2 TABLET: 50; 8.6 TABLET ORAL at 21:03

## 2025-02-04 RX ADMIN — NICOTINE POLACRILEX 2 MG: 2 GUM, CHEWING ORAL at 08:30

## 2025-02-04 RX ADMIN — MULTIPLE VITAMINS W/ MINERALS TAB 1 TABLET: TAB ORAL at 08:29

## 2025-02-04 RX ADMIN — ESCITALOPRAM OXALATE 20 MG: 10 TABLET, FILM COATED ORAL at 08:29

## 2025-02-04 RX ADMIN — LORATADINE 10 MG: 10 TABLET ORAL at 08:28

## 2025-02-04 RX ADMIN — AMLODIPINE BESYLATE 5 MG: 5 TABLET ORAL at 08:28

## 2025-02-04 RX ADMIN — NICOTINE POLACRILEX 2 MG: 2 GUM, CHEWING ORAL at 17:30

## 2025-02-04 RX ADMIN — PANTOPRAZOLE SODIUM 20 MG: 20 TABLET, DELAYED RELEASE ORAL at 08:28

## 2025-02-04 RX ADMIN — CLOZAPINE 250 MG: 25 TABLET ORAL at 21:03

## 2025-02-04 RX ADMIN — DOCUSATE SODIUM 100 MG: 100 CAPSULE, LIQUID FILLED ORAL at 17:48

## 2025-02-04 RX ADMIN — DOCUSATE SODIUM 100 MG: 100 CAPSULE, LIQUID FILLED ORAL at 08:29

## 2025-02-04 RX ADMIN — NICOTINE POLACRILEX 2 MG: 2 GUM, CHEWING ORAL at 21:37

## 2025-02-04 RX ADMIN — MELATONIN TAB 3 MG 9 MG: 3 TAB at 21:03

## 2025-02-04 NOTE — NURSING NOTE
Maintained on ongoing SAFE precaution without incident on this shift. Awake, alert,pleasant and cooperative.  Continues to be compliant with medication regimen. Still struggling with the same auditory hallucination,  utilizing proper coping skill.  Interacts well with his peers.  Behavior control.

## 2025-02-04 NOTE — ASSESSMENT & PLAN NOTE
Reviewed 2/4/25  Follows with medical team and given dietary counseling and need for exercise  No associated orders from this encounter found during lookback period of 72 hours.

## 2025-02-04 NOTE — PROGRESS NOTES
02/04/25 0806   Team Meeting   Meeting Type Daily Rounds   Team Members Present   Team Members Present Physician;Nurse;;Other (Discipline and Name)   Physician Team Member Thomas, Holter   Nursing Team Member Shai Grace   Social Work Team Member Jose J ORTEGA   Patient/Family Present   Patient Present No   Patient's Family Present No     Groups Participation  3/10.   Patient's compliant with medications. He had minimal engagement in his treatment. He isolated in his room. Reports depression and AH. Reports increased lethargy with Haldol.

## 2025-02-04 NOTE — ASSESSMENT & PLAN NOTE
"Reviewed 2/4/25  Pt seen in room and reported \"okay\" mood. Still endorses AH of voices threatening that they are going to \"kill him\" and family. Denied SI/HI/VH. Slept/showered/ate well since yesterday. Nursing denies any acute behaviors.     Continue medication as follows:  Continue Clozapine 250 mg QHS for psychosis - increased 12/11/24 02/04: , BNP 10, CRP 10  02/04: ANC 4.42  To consider further careful titration  CBC w/diff and CK every 2 weeks  Continue haldol to 5 mg BID for psychosis   Continue Lexapro 20 mg daily for depression and anxiety  Continue Propanolol 10 mg BID for anxiety   Continue Melatonin 9 mg QHS for sleep  Continue Senna-Docusate sodium one 50 mg tablet QHS for constipation  Continue Zofran 4 mg tablet Q6H PRN for nausea   Continue PRN mouth kote for xerostomia     - Pt remains on dual antipsychotic Tx due to failure on monotherapy   .  - S/p ECT treatments.  - ACT team referral was made for him living back with his aunt once MA funding comes through from the [facility] and sister will try to reapply  No associated orders from this encounter found during lookback period of 72 hours.    "

## 2025-02-04 NOTE — ASSESSMENT & PLAN NOTE
Reviewed 2/4/25  Follows with medical team  Continue senna-docusate sodium dose per medical team.   Continue with regular bowel movements, had one bowel movement on 2/3/25  No associated orders from this encounter found during lookback period of 72 hours.

## 2025-02-04 NOTE — ASSESSMENT & PLAN NOTE
Reviewed 2/4/25  Follows with medical team on hydrochlorothiazide and Norvasc  No associated orders from this encounter found during lookback period of 72 hours.

## 2025-02-04 NOTE — ASSESSMENT & PLAN NOTE
Reviewed 2/4/25  on nicotine gum as needed and encouraged smoking cessation  Continue to encourage cessation upon discharge  No associated orders from this encounter found during lookback period of 72 hours.

## 2025-02-04 NOTE — ASSESSMENT & PLAN NOTE
Reviewed 2/4/25  Follows with medical team  No associated orders from this encounter found during lookback period of 72 hours.

## 2025-02-04 NOTE — PLAN OF CARE
Problem: Alteration in Thoughts and Perception  Goal: Refrain from acting on delusional thinking/internal stimuli  Description: Interventions:  - Monitor patient closely, per order   - Utilize least restrictive measures   - Set reasonable limits, give positive feedback for acceptable   - Administer medications as ordered and monitor of potential side effects  Outcome: Progressing  Goal: Agree to be compliant with medication regime, as prescribed and report medication side effects  Description: Interventions:  - Offer appropriate PRN medication and supervise ingestion; conduct AIMS, as needed   Outcome: Progressing  Goal: Recognize dysfunctional thoughts, communicate reality-based thoughts at the time of discharge  Description: Interventions:  - Provide medication and psycho-education to assist patient in compliance and developing insight into his/her illness   Outcome: Progressing     Problem: Ineffective Coping  Goal: Participates in unit activities  Description: Interventions:  - Provide therapeutic environment   - Provide required programming   - Redirect inappropriate behaviors   Outcome: Progressing  Goal: Patient/Family participate in treatment and DC plans  Description: Interventions:  - Provide therapeutic environment  Outcome: Progressing  Goal: Patient/Family verbalizes awareness of resources  Outcome: Progressing     Problem: Depression  Goal: Treatment Goal: Demonstrate behavioral control of depressive symptoms, verbalize feelings of improved mood/affect, and adopt new coping skills prior to discharge  Outcome: Progressing  Goal: Verbalize thoughts and feelings  Description: Interventions:  - Assess and re-assess patient's level of risk   - Engage patient in 1:1 interactions, daily, for a minimum of 15 minutes   - Encourage patient to express feelings, fears, frustrations, hopes   Outcome: Progressing  Goal: Refrain from harming self  Description: Interventions:  - Monitor patient closely, per order    - Supervise medication ingestion, monitor effects and side effects   Outcome: Progressing  Goal: Refrain from isolation  Description: Interventions:  - Develop a trusting relationship   - Encourage socialization   Outcome: Progressing  Goal: Refrain from self-neglect  Outcome: Progressing     Problem: Anxiety  Goal: Anxiety is at manageable level  Description: Interventions:  - Assess and monitor patient's anxiety level.   - Monitor for signs and symptoms (heart palpitations, chest pain, shortness of breath, headaches, nausea, feeling jumpy, restlessness, irritable, apprehensive).   - Collaborate with interdisciplinary team and initiate plan and interventions as ordered.  - [place] patient to unit/surroundings  - Explain treatment plan  - Encourage participation in care  - Encourage verbalization of concerns/fears  - Identify coping mechanisms  - Assist in developing anxiety-reducing skills  - Administer/offer alternative therapies  - Limit or eliminate stimulants  Outcome: Progressing     Problem: Alteration in Orientation  Goal: Treatment Goal: Demonstrate a reduction of confusion and improved orientation to person, place, time and/or situation upon discharge, according to optimum baseline/ability  Outcome: Progressing  Goal: Interact with staff daily  Description: Interventions:  - Assess and re-assess patient's level of orientation  - Engage patient in 1 on 1 interactions, daily, for a minimum of 15 minutes   - Establish rapport/trust with patient   Outcome: Progressing  Goal: Allow medical examinations, as recommended  Description: Interventions:  - Provide physical/neurological exams and/or referrals, per provider   Outcome: Progressing  Goal: Cooperate with recommended testing/procedures  Description: Interventions:  - Determine need for ancillary testing  - Observe for mental status changes  - Implement falls/precaution protocol   Outcome: Progressing  Goal: Complete daily ADLs, including personal hygiene  independently, as able  Description: Interventions:  - Observe, teach, and assist patient with ADLS  Outcome: Progressing     Problem: DISCHARGE PLANNING  Goal: Discharge to home with appropriate resources  Description: INTERVENTIONS:  - Identify barriers to discharge w/patient and caregiver  - Arrange for needed discharge resources and transportation as appropriate  - Identify discharge learning needs (meds, wound care, etc.)  - Refer to Case Management Department for coordinating discharge planning if the patient needs post-hospital services based on physician/advanced practitioner order or complex needs related to functional status, cognitive ability, or social support system  Outcome: Progressing     Problem: Alteration in Thoughts and Perception  Goal: Treatment Goal: Gain control of psychotic behaviors/thinking, reduce/eliminate presenting symptoms and demonstrate improved reality functioning upon discharge  Outcome: Not Progressing

## 2025-02-04 NOTE — PLAN OF CARE
Problem: Ineffective Coping  Goal: Identifies ineffective coping skills  Outcome: Not Progressing  Goal: Identifies healthy coping skills  Outcome: Not Progressing  Goal: Demonstrates healthy coping skills  Outcome: Not Progressing  Goal: Participates in unit activities  Description: Interventions:  - Provide therapeutic environment   - Provide required programming   - Redirect inappropriate behaviors   Outcome: Not Progressing   His attendance to groups has diminished.

## 2025-02-04 NOTE — NURSING NOTE
Unable to obtain labs this morning after  x 1 attempt.  Encourage patient to stay hydrated for further testing.

## 2025-02-04 NOTE — ASSESSMENT & PLAN NOTE
Reviewed 2/4/25  Follows with medical  Continue famotidine 20 mg tablet BID per medical team   Continue pantoprazole EC 20 mg tablet every morning per medical team  This is a chronic condition, and is stable unless otherwise noted.  No associated orders from this encounter found during lookback period of 72 hours.

## 2025-02-04 NOTE — NURSING NOTE
Maintained on ongoing SAFE precaution without incident on this shift. Observed resting in bed, respiration even and unlabored. Continuous Q 15 minutes check implemented thru the night.  Schedule labs to be obtain this morning- CBC, CK, C-reactive protein & BNP.   No behavioral noted

## 2025-02-04 NOTE — NURSING NOTE
Pt is calm, cooperative and visible on milieu. Pt reports AH, depression and anxiety; denies SI/HI/AH/VH. Compliant with medications and meals. Social with staff and select peers. No behavioral issues. Q 15 min checks maintained.

## 2025-02-04 NOTE — PROGRESS NOTES
"Progress Note - Behavioral Health   Name: Alberto Berumen 29 y.o. male I MRN: [id number]  Unit/Bed#: [facility] 112-02 I Date of Admission: 3/29/2024   Date of Service: 2/4/2025 I Hospital Day: 312     Assessment & Plan  Schizoaffective disorder, bipolar type (HCC)  Reviewed 2/4/25  Pt seen in room and reported \"okay\" mood. Still endorses AH of voices threatening that they are going to \"kill him\" and family. Denied SI/HI/VH. Slept/showered/ate well since yesterday. Nursing denies any acute behaviors.     Continue medication as follows:  Continue Clozapine 250 mg QHS for psychosis - increased 12/11/24 02/04: , BNP 10, CRP 10  02/04: ANC 4.42  To consider further careful titration  CBC w/diff and CK every 2 weeks  Continue haldol to 5 mg BID for psychosis   Continue Lexapro 20 mg daily for depression and anxiety  Continue Propanolol 10 mg BID for anxiety   Continue Melatonin 9 mg QHS for sleep  Continue Senna-Docusate sodium one 50 mg tablet QHS for constipation  Continue Zofran 4 mg tablet Q6H PRN for nausea   Continue PRN mouth kote for xerostomia     - Pt remains on dual antipsychotic Tx due to failure on monotherapy   .  - S/p ECT treatments.  - ACT team referral was made for him living back with his aunt once MA funding comes through from the [facility] and sister will try to reapply  No associated orders from this encounter found during lookback period of 72 hours.    Chronic idiopathic constipation  Reviewed 2/4/25  Follows with medical team  Continue senna-docusate sodium dose per medical team.   Continue with regular bowel movements, had one bowel movement on 2/3/25  No associated orders from this encounter found during lookback period of 72 hours.  GERD (gastroesophageal reflux disease)  Reviewed 2/4/25  Follows with medical  Continue famotidine 20 mg tablet BID per medical team   Continue pantoprazole EC 20 mg tablet every morning per medical team  This is a chronic condition, and is stable " "unless otherwise noted.  No associated orders from this encounter found during lookback period of 72 hours.  Tobacco abuse  Reviewed 2/4/25  on nicotine gum as needed and encouraged smoking cessation  Continue to encourage cessation upon discharge  No associated orders from this encounter found during lookback period of 72 hours.  Elevated hemoglobin A1c  Reviewed 2/4/25  Follows with medical team  No associated orders from this encounter found during lookback period of 72 hours.  Class 2 obesity in adult  Reviewed 2/4/25  Follows with medical team and given dietary counseling and need for exercise  No associated orders from this encounter found during lookback period of 72 hours.    Primary hypertension  Reviewed 2/4/25  Follows with medical team on hydrochlorothiazide and Norvasc  No associated orders from this encounter found during lookback period of 72 hours.  Plan:  Continue prescribed psychotropic medication regimen  Currently on 201 commitment status  Continue to promote patient participation in therapeutic milieu.  Continue medical management per medicine.  Continue behavioral health checks q.15 minutes.   Continue vitals per behavioral health unit protocol.  Safety: Safety and communication plan established to target dynamic risk factors discussed above.   Discharge and disposition: Ongoing plans for discharge to patient's aunts house pending coordination with ACT services.      SUBJECTIVE     Today, Alberto reports he feels \"Okay.\"    Alberto states feeling like his condition is unchanged from yesterday. While interviewing him, he continues to endorse ongoing auditory hallucinations that state they are \"going to kill him\" and his family. The voices are both male and female voices. He denies the voices commanding him to perform an action at this time. He continues to endorse feeling paranoid and would not feel safe if he left the hospital at this time.     Alberto denies visual hallucinations, suicidal " ideation, or homicidal ideation at this time.     This morning, Alberto does not voices any acute concerns/issues. He continues to report increased fatigue in the mornings since starting haldol. Denies side effects to his psychiatric medications. He states he is more tired in the morning but reports sleeping well overnight. Reports going to the bathroom without difficulty.    Today, He plans to attend groups throughout the day and states his goal for today is to exercise more.     PSYCHIATRIC REVIEW OF SYSTEMS     Behavior over the last 24 hours:  unchanged  Sleep: normal  Appetite: normal  Medication side effects: None    Progress Toward Goals: Limited progress, as evidenced by their participation (or lack thereof) in individual, social and therapeutic milieu in addition to adherence to their medication regimen.  Patient Acceptance: Fair  Patient Understanding: Fair  Patient Involvement in Reintegration Process: Stays in contact with his mother, sister, and aunt on the phone. States he enjoys speaking with them.   Patient's Therapeutic Relationship with Psychiatrist: Trusting  Patient's Optimism Regarding Recovery: At this time, there are ongoing discussions for discharge to the patient's aunts house pending coordination with ACT services.   Group Attendance: 3/10    REVIEW OF SYSTEMS   Review of systems: no complaints    OBJECTIVE     Vital Signs in Past 24 Hours:  Temp:  [97.6 °F (36.4 °C)-97.8 °F (36.6 °C)] 97.6 °F (36.4 °C)  HR:  [] 79  BP: (114-136)/(64-75) 122/75  Resp:  [18] 18  SpO2:  [99 %-100 %] 100 %  O2 Device: None (Room air)    Intake/Output in Past 24 hours:  No intake/output data recorded.  No intake/output data recorded.        Laboratory Results:  I have personally reviewed all pertinent laboratory/tests results.  Most Recent Labs:   Lab Results   Component Value Date    WBC 9.74 02/04/2025    RBC 5.54 02/04/2025    HGB 12.9 02/04/2025    HCT 43.5 02/04/2025     02/04/2025    RDW 14.1  02/04/2025    NEUTROABS 4.42 02/04/2025    SODIUM 140 10/16/2024    K 3.8 10/16/2024     10/16/2024    CO2 29 10/16/2024    BUN 9 10/16/2024    CREATININE 0.91 10/16/2024    GLUC 94 10/16/2024    GLUF 94 10/16/2024    CALCIUM 9.5 10/16/2024    AST 26 09/30/2024    ALT 42 09/30/2024    ALKPHOS 64 09/30/2024    TP 6.6 09/30/2024    ALB 3.8 09/30/2024    TBILI 0.47 09/30/2024    CHOLESTEROL 156 03/14/2024    HDL 44 03/14/2024    TRIG 133 03/14/2024    LDLCALC 85 03/14/2024    NONHDLC 112 03/14/2024    LITHIUM 0.61 01/09/2024    [id number] 1.062 11/15/2023    HGBA1C 5.0 04/01/2024    EAG 97 04/01/2024       Behavioral Health Medications: all current active meds have been reviewed.    Current Facility-Administered Medications   Medication Dose Route Frequency Provider Last Rate    acetaminophen  650 mg Oral Q4H PRN Jordan C Holter, DO      acetaminophen  650 mg Oral Q6H PRN HOLLI Lion      aluminum-magnesium hydroxide-simethicone  30 mL Oral Q4H PRN Jordan C Holter, DO      amLODIPine  5 mg Oral Daily HOLLI Lion      Artificial Tears  1 drop Both Eyes Q3H PRN Jordan C Holter, DO      haloperidol lactate  2.5 mg Intramuscular Q4H PRN Max 4/day HOLLI Lion      And    LORazepam  1 mg Intramuscular Q4H PRN Max 4/day HOLLI Lion      And    benztropine  0.5 mg Intramuscular Q4H PRN Max 4/day HOLLI Lion      haloperidol lactate  5 mg Intramuscular Q4H PRN Max 4/day HOLLI Lion      And    LORazepam  2 mg Intramuscular Q4H PRN Max 4/day HOLLI Lion      And    benztropine  1 mg Intramuscular Q4H PRN Max 4/day HOLLI Lion      bisacodyl  10 mg Rectal Daily PRN HOLLI Lion      calcium carbonate  500 mg Oral BID PRN HOLLI Monge      cloZAPine  250 mg Oral HS Bora Rosario MD      hydrOXYzine HCL  50 mg Oral Q6H PRN Max 4/day Jordan C Holter, DO      Or    diphenhydrAMINE  50 mg Intramuscular Q6H PRN Jordan C Holter,       hydrOXYzine  HCL  50 mg Oral Q6H PRN Max 4/day HOLLI Lion      Or    diphenhydrAMINE  50 mg Intramuscular Q6H PRN HOLLI Lion      diphenhydrAMINE-zinc acetate   Topical BID PRN HOLLI Lion      docusate sodium  100 mg Oral BID Jourdan Dickens, DO      escitalopram  20 mg Oral Daily HOLLI Lion      famotidine  20 mg Oral BID PRN HOLLI Monge      fluticasone  1 spray Each Nare Daily PRN HOLLI Monge      haloperidol  1 mg Oral Q6H PRN HOLLI Lion      haloperidol  2.5 mg Oral Q4H PRN Max 4/day HOLLI Lion      haloperidol  5 mg Oral Q4H PRN Max 4/day HOLLI Lion      haloperidol  5 mg Oral BID Bora Rosario MD      hydroCHLOROthiazide  12.5 mg Oral Daily Pia Mantilla, HOLLI      hydrocortisone   Topical 4x Daily PRN HOLLI Lion      hydrOXYzine HCL  100 mg Oral Q6H PRN Max 4/day Ion  Holter, DO      Or    LORazepam  2 mg Intramuscular Q6H PRN Ion C Holter, DO      hydrOXYzine HCL  100 mg Oral Q6H PRN Max 4/day HOLLI Lion      Or    LORazepam  2 mg Intramuscular Q6H PRN HOLLI Lion      hydrOXYzine HCL  25 mg Oral Q6H PRN Max 4/day Ion C Holter, DO      ibuprofen  600 mg Oral Q8H PRN HOLLI Lion      influenza vaccine  0.5 mL Intramuscular Once Bora Rosario MD      loratadine  10 mg Oral Daily Brina Guillen MD      magnesium hydroxide  30 mL Oral Once Jourdan Dickens, DO      melatonin  3 mg Oral HS PRN Bora Rosario MD      melatonin  9 mg Oral HS Bora Rosario MD      methocarbamol  500 mg Oral Q6H PRN HOLLI Lion      multivitamin-minerals  1 tablet Oral Daily HOLLI Monge      nicotine polacrilex  2 mg Oral Q4H PRN Bora Rosario MD      OLANZapine  5 mg Oral Q4H PRN Max 3/day Ion C Holter, DO      Or    OLANZapine  2.5 mg Intramuscular Q4H PRN Max 3/day Ion C Holter, DO      OLANZapine  5 mg Oral Q3H PRN Max 3/day Ion FISCHER Holter, DO      Or    OLANZapine  5 mg  "Intramuscular Q3H PRN Max 3/day Jordan C Holter, DO      OLANZapine  2.5 mg Oral Q4H PRN Max 6/day Jordan C Holter, DO      ondansetron  4 mg Oral Q6H PRN HOLLI Lion      pantoprazole  20 mg Oral QAM Ion Dupontter, DO      polyethylene glycol  17 g Oral Daily PRN Sofi Yoo, HOLLI      polyethylene glycol  17 g Oral Daily Jourdan Dickens, DO      propranolol  10 mg Oral Q12H KAYLIE HOLLI Lion      saliva substitute  5 spray Mouth/Throat 4x Daily PRN Mary Jo Reich PA-C      senna-docusate sodium  1 tablet Oral Daily PRN Jordan C Holter, DO      senna-docusate sodium  2 tablet Oral HS Jourdan Dickens, DO      traZODone  50 mg Oral HS PRN HOLLI Lion      white petrolatum-mineral oil   Topical TID PRN HOLLI Lion         Risks, benefits and possible side effects of Medications:   Risks, benefits, and possible side effects of medications explained to patient and patient verbalizes understanding.      Dual Antipsychotic Rationale: The patient is on two antipsychotics due to multiple failed medication trails and inability to control symptoms on a single antipsychotic agent.     Mental Status Evaluation:  Appearance:  Alert but fatigued, appears stated age, casually dressed laying in bed, fair grooming and hygiene, limited eye contact no acute distress   Behavior:  Calm, polite, and cooperative   Speech:  normal rate, fluent, clear, coherent, soft   Mood:  \"Okay\"   Affect:  Constricted, depressed   Thought Process:  organized, logical, linear, concrete    Associations: concrete associations   Thought Content:  paranoid ideation in the setting of auditory hallucinations, feels unsafe outside of hospital   Perceptual Disturbances: Continues to report ongoing auditory hallucinations of male and female voices that state they are \"going to kill him\" and family. Denies visual hallucinations or command auditory hallucinations. Does no appear to interact with internal stimuli.   Risk Potential: " Suicidal ideation - Denies  Homicidal ideation - Denies  Potential for aggression - Not at present   Sensorium:  oriented to person, place, and time/date   Memory:  recent memory grossly intact, remote memory grossly intact   Consciousness:  alert and awake   Attention/Concentration: attention span and concentration appear shorter than expected for age   Insight:  fair   Judgment: limited   Gait/Station: in bed   Motor Activity: no abnormal movements     Recommended Treatment:   See above for assessment and plan.    Counseling/Coordination of Care    Total unit time spent today was greater than 15 minutes. Greater than 50% of total time was spent with the patient and/or patient's relatives and/or coordination of patient's care.     Patient's rights, confidentiality, exceptions to confidentiality, use of electronic medical record including appropriate staff access to medical record regarding behavioral health services and consent to treatment were reviewed.    Note Share:     This note was not shared with the patient due to reasonable likelihood of causing patient harm     Sharon Pierre   Psychiatry, PA-S

## 2025-02-05 PROCEDURE — 99232 SBSQ HOSP IP/OBS MODERATE 35: CPT | Performed by: PSYCHIATRY & NEUROLOGY

## 2025-02-05 RX ADMIN — MULTIPLE VITAMINS W/ MINERALS TAB 1 TABLET: TAB ORAL at 08:42

## 2025-02-05 RX ADMIN — PROPRANOLOL HYDROCHLORIDE 10 MG: 10 TABLET ORAL at 21:47

## 2025-02-05 RX ADMIN — MELATONIN TAB 3 MG 9 MG: 3 TAB at 21:47

## 2025-02-05 RX ADMIN — LORATADINE 10 MG: 10 TABLET ORAL at 08:40

## 2025-02-05 RX ADMIN — NICOTINE POLACRILEX 2 MG: 2 GUM, CHEWING ORAL at 17:13

## 2025-02-05 RX ADMIN — HALOPERIDOL 5 MG: 5 TABLET ORAL at 17:13

## 2025-02-05 RX ADMIN — NICOTINE POLACRILEX 2 MG: 2 GUM, CHEWING ORAL at 08:40

## 2025-02-05 RX ADMIN — CLOZAPINE 250 MG: 25 TABLET ORAL at 21:47

## 2025-02-05 RX ADMIN — DOCUSATE SODIUM 100 MG: 100 CAPSULE, LIQUID FILLED ORAL at 08:39

## 2025-02-05 RX ADMIN — AMLODIPINE BESYLATE 5 MG: 5 TABLET ORAL at 08:40

## 2025-02-05 RX ADMIN — HYDROCHLOROTHIAZIDE 12.5 MG: 12.5 TABLET ORAL at 08:40

## 2025-02-05 RX ADMIN — DOCUSATE SODIUM 100 MG: 100 CAPSULE, LIQUID FILLED ORAL at 17:12

## 2025-02-05 RX ADMIN — NICOTINE POLACRILEX 2 MG: 2 GUM, CHEWING ORAL at 12:36

## 2025-02-05 RX ADMIN — PROPRANOLOL HYDROCHLORIDE 10 MG: 10 TABLET ORAL at 08:41

## 2025-02-05 RX ADMIN — SENNOSIDES AND DOCUSATE SODIUM 2 TABLET: 50; 8.6 TABLET ORAL at 21:47

## 2025-02-05 RX ADMIN — ESCITALOPRAM OXALATE 20 MG: 10 TABLET, FILM COATED ORAL at 08:41

## 2025-02-05 RX ADMIN — PANTOPRAZOLE SODIUM 20 MG: 20 TABLET, DELAYED RELEASE ORAL at 08:39

## 2025-02-05 RX ADMIN — HALOPERIDOL 5 MG: 5 TABLET ORAL at 08:41

## 2025-02-05 NOTE — NURSING NOTE
Pt is visible in the milieu and social with select peers. He consumed 100 % of dinner. Took his medications without incidence. Pt endorses AH, anxiety and depression. Denies VH/SI/HI. Able to make needs known. Pt reports feeling tired. No unmet needs. Attended Community, Exercise, Nursing, and Wrap up groups. No behavioral issues.

## 2025-02-05 NOTE — ASSESSMENT & PLAN NOTE
Reviewed 2/5/25  on nicotine gum as needed and encouraged smoking cessation  Continue to encourage cessation upon discharge  No associated orders from this encounter found during lookback period of 72 hours.

## 2025-02-05 NOTE — ASSESSMENT & PLAN NOTE
"Reviewed 2/5/25  Pt seen in room and reported \"okay\" mood. Still endorses AH of voices threatening that they are going to \"kill him\" and family. Denied SI/HI/VH. Slept/showered/ate well since yesterday. Nursing denies any acute behaviors.     Continue medication as follows:  Continue Clozapine 250 mg QHS for psychosis - increased 12/11/24 02/04: , BNP 10, CRP 10  02/04: ANC 4.42  To consider further careful titration  CBC w/diff and CK every 2 weeks  Continue haldol to 5 mg BID for psychosis   Continue Lexapro 20 mg daily for depression and anxiety  Continue Propanolol 10 mg BID for anxiety   Continue Melatonin 9 mg QHS for sleep  Continue Senna-Docusate sodium one 50 mg tablet QHS for constipation  Continue Zofran 4 mg tablet Q6H PRN for nausea   Continue PRN mouth kote for xerostomia     - Pt remains on dual antipsychotic Tx due to failure on monotherapy   .  - S/p ECT treatments.  - ACT team referral was made for him living back with his aunt once MA funding comes through from the [facility] and sister will try to reapply  No associated orders from this encounter found during lookback period of 72 hours.    "

## 2025-02-05 NOTE — PLAN OF CARE
Problem: Alteration in Thoughts and Perception  Goal: Treatment Goal: Gain control of psychotic behaviors/thinking, reduce/eliminate presenting symptoms and demonstrate improved reality functioning upon discharge  Outcome: Progressing  Goal: Refrain from acting on delusional thinking/internal stimuli  Description: Interventions:  - Monitor patient closely, per order   - Utilize least restrictive measures   - Set reasonable limits, give positive feedback for acceptable   - Administer medications as ordered and monitor of potential side effects  Outcome: Progressing  Goal: Agree to be compliant with medication regime, as prescribed and report medication side effects  Description: Interventions:  - Offer appropriate PRN medication and supervise ingestion; conduct AIMS, as needed   Outcome: Progressing  Goal: Recognize dysfunctional thoughts, communicate reality-based thoughts at the time of discharge  Description: Interventions:  - Provide medication and psycho-education to assist patient in compliance and developing insight into his/her illness   Outcome: Progressing     Problem: Ineffective Coping  Goal: Identifies ineffective coping skills  Outcome: Progressing  Goal: Identifies healthy coping skills  Outcome: Progressing  Goal: Demonstrates healthy coping skills  Outcome: Progressing  Goal: Participates in unit activities  Description: Interventions:  - Provide therapeutic environment   - Provide required programming   - Redirect inappropriate behaviors   Outcome: Progressing     Problem: Depression  Goal: Treatment Goal: Demonstrate behavioral control of depressive symptoms, verbalize feelings of improved mood/affect, and adopt new coping skills prior to discharge  Outcome: Progressing  Goal: Refrain from isolation  Description: Interventions:  - Develop a trusting relationship   - Encourage socialization   Outcome: Progressing     Problem: Anxiety  Goal: Anxiety is at manageable level  Description:  Interventions:  - Assess and monitor patient's anxiety level.   - Monitor for signs and symptoms (heart palpitations, chest pain, shortness of breath, headaches, nausea, feeling jumpy, restlessness, irritable, apprehensive).   - Collaborate with interdisciplinary team and initiate plan and interventions as ordered.  - [place] patient to unit/surroundings  - Explain treatment plan  - Encourage participation in care  - Encourage verbalization of concerns/fears  - Identify coping mechanisms  - Assist in developing anxiety-reducing skills  - Administer/offer alternative therapies  - Limit or eliminate stimulants  Outcome: Progressing     Problem: Alteration in Orientation  Goal: Interact with staff daily  Description: Interventions:  - Assess and re-assess patient's level of orientation  - Engage patient in 1 on 1 interactions, daily, for a minimum of 15 minutes   - Establish rapport/trust with patient   Outcome: Progressing

## 2025-02-05 NOTE — ASSESSMENT & PLAN NOTE
Reviewed 2/5/25  Follows with medical team  Continue senna-docusate sodium dose per medical team.   Continue with regular bowel movements  No associated orders from this encounter found during lookback period of 72 hours.

## 2025-02-05 NOTE — ASSESSMENT & PLAN NOTE
Reviewed 2/5/25  Follows with medical team  No associated orders from this encounter found during lookback period of 72 hours.

## 2025-02-05 NOTE — NURSING NOTE
Pt is calm, cooperative and visible on milieu. Pt reports AH, depression and anxiety; denies SI/HI/VH. Compliant with medications and meals. Social with staff and peers. Attends groups. Q 15 min checks maintained.

## 2025-02-05 NOTE — PROGRESS NOTES
"Progress Note - Behavioral Health   Name: Alberto Berumen 29 y.o. male I MRN: [id number]   Unit/Bed#: [facility] 112-02 I Date of Admission: 3/29/2024   Date of Service: 2/5/2025 I Hospital Day: 313     { ?Quick Links I Problem List I PORCH I Billing Tip:75709}    Assessment & Plan  Schizoaffective disorder, bipolar type (HCC)  Reviewed 2/5/25  Pt seen in room and reported \"okay\" mood. Still endorses AH of voices threatening that they are going to \"kill him\" and family. Denied SI/HI/VH. Slept/showered/ate well since yesterday. Nursing denies any acute behaviors.     Continue medication as follows:  Continue Clozapine 250 mg QHS for psychosis - increased 12/11/24 02/04: , BNP 10, CRP 10  02/04: ANC 4.42  To consider further careful titration  CBC w/diff and CK every 2 weeks  Continue haldol to 5 mg BID for psychosis   Continue Lexapro 20 mg daily for depression and anxiety  Continue Propanolol 10 mg BID for anxiety   Continue Melatonin 9 mg QHS for sleep  Continue Senna-Docusate sodium one 50 mg tablet QHS for constipation  Continue Zofran 4 mg tablet Q6H PRN for nausea   Continue PRN mouth kote for xerostomia     - Pt remains on dual antipsychotic Tx due to failure on monotherapy   .  - S/p ECT treatments.  - ACT team referral was made for him living back with his aunt once MA funding comes through from the [facility] and sister will try to reapply  No associated orders from this encounter found during lookback period of 72 hours.    Chronic idiopathic constipation  Reviewed 2/5/25  Follows with medical team  Continue senna-docusate sodium dose per medical team.   Continue with regular bowel movements, had one bowel movement on 2/3/25  No associated orders from this encounter found during lookback period of 72 hours.  GERD (gastroesophageal reflux disease)  Reviewed 2/5/25  Follows with medical  Continue famotidine 20 mg tablet BID per medical team   Continue pantoprazole EC 20 mg tablet every morning " per medical team  This is a chronic condition, and is stable unless otherwise noted.  No associated orders from this encounter found during lookback period of 72 hours.  Tobacco abuse  Reviewed 2/5/25  on nicotine gum as needed and encouraged smoking cessation  Continue to encourage cessation upon discharge  No associated orders from this encounter found during lookback period of 72 hours.  Elevated hemoglobin A1c  Reviewed 2/5/25  Follows with medical team  No associated orders from this encounter found during lookback period of 72 hours.  Class 2 obesity in adult  Reviewed 2/5/25  Follows with medical team and given dietary counseling and need for exercise  No associated orders from this encounter found during lookback period of 72 hours.    Primary hypertension  Reviewed 2/5/25  Follows with medical team on hydrochlorothiazide and Norvasc  No associated orders from this encounter found during lookback period of 72 hours.        Recommended Treatment:    Treatment plan and medication changes discussed and per the attending physician the plan is:     1.Continue with group therapy, milieu therapy and occupational therapy  2.Behavioral Health checks every 15 minutes  3.Continue frequent safety checks and vitals per unit protocol  4.Continue with SLIM medical management as indicated  5.Continue with current medication regimen  6.Will review labs in the a.m.  7.Disposition Planning: Discharge planning and efforts remain ongoing      Planned medication and treatment changes:    {EFO PROGRESS PRECAUTIONS/CONSULTS:32696}  {EFO PROGRESS MEDICATION CHANGES:77779}    Current medications:  Current Facility-Administered Medications   Medication Dose Route Frequency Provider Last Rate    acetaminophen  650 mg Oral Q4H PRN Jordan C Holter,       acetaminophen  650 mg Oral Q6H PRN HOLLI Lion      aluminum-magnesium hydroxide-simethicone  30 mL Oral Q4H PRN Jordan C Holter, DO      amLODIPine  5 mg Oral Daily Eveline  HOLLI Hunt      Artificial Tears  1 drop Both Eyes Q3H PRN Jordan C Holter, DO      haloperidol lactate  2.5 mg Intramuscular Q4H PRN Max 4/day HOLLI Lion      And    LORazepam  1 mg Intramuscular Q4H PRN Max 4/day STEVE LionNP      And    benztropine  0.5 mg Intramuscular Q4H PRN Max 4/day STEVE LionNP      haloperidol lactate  5 mg Intramuscular Q4H PRN Max 4/day STEVE LionNP      And    LORazepam  2 mg Intramuscular Q4H PRN Max 4/day STEVE LionNP      And    benztropine  1 mg Intramuscular Q4H PRN Max 4/day HOLLI Lion      bisacodyl  10 mg Rectal Daily PRN HOLLI Lion      calcium carbonate  500 mg Oral BID PRN HOLLI Monge      cloZAPine  250 mg Oral HS Bora Rosario MD      hydrOXYzine HCL  50 mg Oral Q6H PRN Max 4/day Jordan C Holter, DO      Or    diphenhydrAMINE  50 mg Intramuscular Q6H PRN Jordan C Holter, DO      hydrOXYzine HCL  50 mg Oral Q6H PRN Max 4/day HOLLI Loin      Or    diphenhydrAMINE  50 mg Intramuscular Q6H PRN HOLLI Lion      diphenhydrAMINE-zinc acetate   Topical BID PRN HOLLI Lion      docusate sodium  100 mg Oral BID Jourdan Dickens, DO      escitalopram  20 mg Oral Daily HOLLI Lion      famotidine  20 mg Oral BID PRN HOLLI Monge      fluticasone  1 spray Each Nare Daily PRN HOLLI Monge      haloperidol  1 mg Oral Q6H PRN HOLLI Lion      haloperidol  2.5 mg Oral Q4H PRN Max 4/day HOLLI Lion      haloperidol  5 mg Oral Q4H PRN Max 4/day HOLLI Lion      haloperidol  5 mg Oral BID Bora Rosario MD      hydroCHLOROthiazide  12.5 mg Oral Daily Pia Mantilla, HOLLI      hydrocortisone   Topical 4x Daily PRN HOLLI Lion      hydrOXYzine HCL  100 mg Oral Q6H PRN Max 4/day Jordan C Holter,       Or    LORazepam  2 mg Intramuscular Q6H PRN Jordan C Holter,       hydrOXYzine HCL  100 mg Oral Q6H PRN Max 4/day HOLLI Lion       Or    LORazepam  2 mg Intramuscular Q6H PRN HOLLI Lion      hydrOXYzine HCL  25 mg Oral Q6H PRN Max 4/day [facility] Holter, DO      ibuprofen  600 mg Oral Q8H PRN HOLLI Lion      influenza vaccine  0.5 mL Intramuscular Once Bora Rosario MD      loratadine  10 mg Oral Daily Brina Guillen MD      magnesium hydroxide  30 mL Oral Once Jourdan Dickens, DO      melatonin  3 mg Oral HS PRN Bora Rosario MD      melatonin  9 mg Oral HS Bora Rosario MD      methocarbamol  500 mg Oral Q6H PRN HOLLI Lion      multivitamin-minerals  1 tablet Oral Daily Eileen GonzalezHOLLI evans      nicotine polacrilex  2 mg Oral Q4H PRN Bora Rosario MD      OLANZapine  5 mg Oral Q4H PRN Max 3/day [facility] Holter, DO      Or    OLANZapine  2.5 mg Intramuscular Q4H PRN Max 3/day [facility] Holter, DO      OLANZapine  5 mg Oral Q3H PRN Max 3/day [facility] Holter, DO      Or    OLANZapine  5 mg Intramuscular Q3H PRN Max 3/day [facility] Holter, DO      OLANZapine  2.5 mg Oral Q4H PRN Max 6/day [facility] Holter, DO      ondansetron  4 mg Oral Q6H PRN HOLLI Lion      pantoprazole  [facility]      polyethylene glycol  17 g Oral Daily PRN HOLLI Ghotra      polyethylene glycol  17 g Oral Daily Jourdan Dickens, DO      propranolol  10 mg Oral Q12H KAYLIE HOLLI Lion      saliva substitute  5 spray Mouth/Throat 4x Daily PRN Mary Jo Reich PA-C      senna-docusate sodium  1 tablet Oral Daily PRN [facility] Holter, DO      senna-docusate sodium  2 tablet Oral HS Jourdan Dickens, DO      traZODone  50 mg Oral HS PRN HOLLI Lion      white petrolatum-mineral oil   Topical TID PRN HOLLI Lion         Risks / Benefits of Treatment:    Risks, benefits, and possible side effects of medications explained to patient and patient verbalizes understanding and agreement for treatment.    Subjective:    Behavior over the last 24 hours: {EFO improved/regressed/unchanged:35076}.  "    Alberto {EFO Daily Progress UNCHANGED:89603}, {EFO Daily Progress UNCHANGED/IMPROVED:76273}, {EFO Daily Progress UNCHANGED/IMPROVED:25025} today. ***. {EFO MILIEU PARTICIPATION:46587}    Sleep: {EFO sl ip bh sleep:24350}  Appetite: {EFO sl ip bh appetite:83952}  Medication side effects: {EFO EXAM; YES/NO:21843::\"No\"}   ROS: {EFO ROS Review:92748}    Mental Status Evaluation:    Appearance:  {EFO EXAM; GENERAL PSYCH:51522}   Behavior:  {EFO SL IP Exam; behavior:32795}   Speech:  {EFO SL IP findings; speech:48624}   Mood:  {EFO EXAM; MOOD LESS/MORE:97772}   Affect:  {EFO AFFECT LESS/MORE:09827}   Thought Process:  {EFO MISC; THOUGHT PROCESS:61625}   Associations: {EFO THOUGHT ASSOCIATIONS:66846}   Thought Content:  {EFO SL IP Exam; psych thought content:44304}   Perceptual Disturbances: {EFO Perceptual Disturbances:32418}   Risk Potential: Suicidal ideation - {EFO SI/HI with/without plan:33822}  Homicidal ideation - {EFO HI with/without plan:48695}  Potential for aggression - {Potential for aggression:08397::\"No\"}   Sensorium:  {EFO ORIENTED/DISORIENTED:90599}   Memory:  {EFO EXAM; PSYCH COGNITION:46332}   Consciousness:  {EFO Consciousness:32864}   Attention/Concentration: {EFO EXAM; NEURO PED ATTENTION:43700}   Insight:  {EFO EXAM; PSYCH INSIGHT/JUDGEMENT:19402}   Judgment: {EFO EXAM; PSYCH INSIGHT/JUDGEMENT:65226}   Gait/Station: {EFO SL IP BH Gait/Station:85177}   Motor Activity: {EFO SL IP Psych Motor Activity:49477}     Vital signs in last 24 hours:    Temp:  [97.5 °F (36.4 °C)] 97.5 °F (36.4 °C)  HR:  [] 69  BP: (122-154)/(62-68) 122/68  Resp:  [18] 18  SpO2:  [96 %-100 %] 96 %  O2 Device: None (Room air)    {?Quick Links I Lab Review I Micro Results I Radiology I Cardiology:09002}     Laboratory results: {EFO I have reviewed all laboratory results:71408::\"I have personally reviewed all pertinent laboratory/tests results\"}    {EFO Progress Note Labs Revised (Optional):26479}    Suicide/Homicide Risk " Assessment:    Risk of Harm to Self:   {EFO Suicide Risk Inpatient Progress Note:02013}    Risk of Harm to Others:  {EFO Homicide Risk Inpatient Progress Note:22254}    The following interventions are recommended: {EFO SUICIDE INP INTERVENT:79607}    Progress Toward Goals: {EFO Progress Towards Goal:27826}    Counseling / Coordination of Care:    {EFO Coordination of Care Statements Revised:62145}    {Administrative / Billing Section (Optional):92590}    HOLLI Quinteros 02/05/25

## 2025-02-05 NOTE — PROGRESS NOTES
02/05/25 0750   Team Meeting   Meeting Type Daily Rounds   Team Members Present   Team Members Present Physician;Nurse;;Other (Discipline and Name)   Physician Team Member Holter, McCarthy, Noonan CRNP   Nursing Team Member Thaddeus   Social Work Team Member Jose J ROTEGA   Other (Discipline and Name) Wang PCM   Patient/Family Present   Patient Present No   Patient's Family Present No     Groups Participation  6/11.   Patient's compliant with medications. He's engaged in his treatment., Depressed, AH. Waiting for [facility] and [facility] funding to d/c to home. Reports lethargy.

## 2025-02-05 NOTE — ASSESSMENT & PLAN NOTE
"Reviewed 2/4/25  Pt seen in room and reported \"okay\" mood. Still endorses AH of voices threatening that they are going to \"kill him\" and family. Denied SI/HI/VH. Slept/showered/ate well since yesterday. Nursing denies any acute behaviors.     Continue medication as follows:  Continue Clozapine 250 mg QHS for psychosis - increased 12/11/24 02/04: , BNP 10, CRP 10  02/04: ANC 4.42  To consider further careful titration  CBC w/diff and CK every 2 weeks  Continue haldol to 5 mg BID for psychosis   Continue Lexapro 20 mg daily for depression and anxiety  Continue Propanolol 10 mg BID for anxiety   Continue Melatonin 9 mg QHS for sleep  Continue Senna-Docusate sodium one 50 mg tablet QHS for constipation  Continue Zofran 4 mg tablet Q6H PRN for nausea   Continue PRN mouth kote for xerostomia     - Pt remains on dual antipsychotic Tx due to failure on monotherapy   .  - S/p ECT treatments.  - ACT team referral was made for him living back with his aunt once MA funding comes through from the [facility] and sister will try to reapply  No associated orders from this encounter found during lookback period of 72 hours.    "

## 2025-02-05 NOTE — ASSESSMENT & PLAN NOTE
Reviewed 2/5/25  Follows with medical team on hydrochlorothiazide and Norvasc  No associated orders from this encounter found during lookback period of 72 hours.

## 2025-02-05 NOTE — ASSESSMENT & PLAN NOTE
Reviewed 2/5/25  Follows with medical  Continue famotidine 20 mg tablet BID per medical team   Continue pantoprazole EC 20 mg tablet every morning per medical team  This is a chronic condition, and is stable unless otherwise noted.  No associated orders from this encounter found during lookback period of 72 hours.

## 2025-02-05 NOTE — PROGRESS NOTES
"Progress Note - Behavioral Health   Name: Alberto Berumen 29 y.o. male I MRN: [id number]  Unit/Bed#: [facility] 112-02 I Date of Admission: 3/29/2024   Date of Service: 2/5/2025 I Hospital Day: 313     Assessment & Plan  Schizoaffective disorder, bipolar type (HCC)  Reviewed 2/5/25  Pt seen in room and reported \"okay\" mood. Still endorses AH of voices threatening that they are going to \"kill him\" and family. Denied SI/HI/VH. Slept/showered/ate well since yesterday. Nursing denies any acute behaviors.     Continue medication as follows:  Continue Clozapine 250 mg QHS for psychosis - increased 12/11/24 02/04: , BNP 10, CRP 10  02/04: ANC 4.42  To consider further careful titration  CBC w/diff and CK every 2 weeks  Continue haldol to 5 mg BID for psychosis   Continue Lexapro 20 mg daily for depression and anxiety  Continue Propanolol 10 mg BID for anxiety   Continue Melatonin 9 mg QHS for sleep  Continue Senna-Docusate sodium one 50 mg tablet QHS for constipation  Continue Zofran 4 mg tablet Q6H PRN for nausea   Continue PRN mouth kote for xerostomia     - Pt remains on dual antipsychotic Tx due to failure on monotherapy   .  - S/p ECT treatments.  - ACT team referral was made for him living back with his aunt once MA funding comes through from the [facility] and sister will try to reapply  No associated orders from this encounter found during lookback period of 72 hours.    Chronic idiopathic constipation  Reviewed 2/5/25  Follows with medical team  Continue senna-docusate sodium dose per medical team.   Continue with regular bowel movements  No associated orders from this encounter found during lookback period of 72 hours.  GERD (gastroesophageal reflux disease)  Reviewed 2/5/25  Follows with medical  Continue famotidine 20 mg tablet BID per medical team   Continue pantoprazole EC 20 mg tablet every morning per medical team  This is a chronic condition, and is stable unless otherwise noted.  No associated " "orders from this encounter found during lookback period of 72 hours.  Tobacco abuse  Reviewed 2/5/25  On nicotine gum as needed and encouraged smoking cessation  Continue to encourage cessation upon discharge  No associated orders from this encounter found during lookback period of 72 hours.  Elevated hemoglobin A1c  Reviewed 2/5/25  Follows with medical team  No associated orders from this encounter found during lookback period of 72 hours.  Class 2 obesity in adult  Reviewed 2/5/25  Follows with medical team and given dietary counseling and need for exercise  No associated orders from this encounter found during lookback period of 72 hours.    Primary hypertension  Reviewed 2/5/25  Follows with medical team on hydrochlorothiazide and Norvasc  No associated orders from this encounter found during lookback period of 72 hours.    Plan:  Continue prescribed psychotropic medication regimen  Currently on 201 commitment status  Continue to promote patient participation in therapeutic milieu.  Continue medical management per medicine.  Continue behavioral health checks q.15 minutes.   Continue vitals per behavioral health unit protocol.  Safety: Safety and communication plan established to target dynamic risk factors discussed above.   Discharge and disposition: Planning discharge to patient's aunts house pending coordination of ACT services.    SUBJECTIVE     Today, Alberto feels \"fine\" and does not not improvement of symptoms since yesterday. He states his ongoing auditory hallucinations remain unchanged stating they are still threatening to kill him, which he has endorsed for the last 3 years. They are both male and female voices and denies the voices commanding him to perform an action. He continues to feel paranoid in this context and would not feel safe leaving the hospital a this time. Denies feeling excessively sad, suicidal ideation, homicidal ideation, or visual hallucinations.    At the time of interview, " Alberto does not voice any concerns/acute issues. He continues to report increased fatigue since starting haldol which lasts for the duration of the day. Denies side effects of his psychiatric medications. Eating 2/3 meals, sleeping well, and bathing regularly. Reports no difficulty with bowel movements.     Alberto plans to attend groups throughout the day and work on his coping skills such as distraction techniques. He also has a goal to exercise more.     PSYCHIATRIC REVIEW OF SYSTEMS     Behavior over the last 24 hours:  unchanged  Sleep: hypersomnia  Appetite: normal  Medication side effects: No    Progress Toward Goals: Limited progress, as evidenced by their participation (or lack thereof) in individual, social and therapeutic milieu in addition to adherence to their medication regimen.  Patient Acceptance: Fair  Patient Understanding: Fair  Patient Involvement in Reintegration Process: Patient remains in contact with his mother, sister, and aunt.  Patient's Therapeutic Relationship with Psychiatrist: Trusting  Patient's Optimism Regarding Recovery: Ongoing plans for Alberto to be discharged to his aunt's house following coordination with ACT services.  Group Attendance: 6/11    REVIEW OF SYSTEMS   Review of systems: no complaints    OBJECTIVE     Vital Signs in Past 24 Hours:  Temp:  [97.5 °F (36.4 °C)] 97.5 °F (36.4 °C)  HR:  [] 69  BP: (122-154)/(62-68) 122/68  Resp:  [18] 18  SpO2:  [96 %-100 %] 96 %  O2 Device: None (Room air)    Intake/Output in Past 24 hours:  No intake/output data recorded.  No intake/output data recorded.        Laboratory Results:  I have personally reviewed all pertinent laboratory/tests results.  Most Recent Labs:   Lab Results   Component Value Date    WBC 9.74 02/04/2025    RBC 5.54 02/04/2025    HGB 12.9 02/04/2025    HCT 43.5 02/04/2025     02/04/2025    RDW 14.1 02/04/2025    NEUTROABS 4.42 02/04/2025    SODIUM 140 10/16/2024    K 3.8 10/16/2024      10/16/2024    CO2 29 10/16/2024    BUN 9 10/16/2024    CREATININE 0.91 10/16/2024    GLUC 94 10/16/2024    GLUF 94 10/16/2024    CALCIUM 9.5 10/16/2024    AST 26 09/30/2024    ALT 42 09/30/2024    ALKPHOS 64 09/30/2024    TP 6.6 09/30/2024    ALB 3.8 09/30/2024    TBILI 0.47 09/30/2024    CHOLESTEROL 156 03/14/2024    HDL 44 03/14/2024    TRIG 133 03/14/2024    LDLCALC 85 03/14/2024    NONHDLC 112 03/14/2024    LITHIUM 0.61 01/09/2024    [id number] 1.062 11/15/2023    HGBA1C 5.0 04/01/2024    EAG 97 04/01/2024       Behavioral Health Medications: all current active meds have been reviewed.    Current Facility-Administered Medications   Medication Dose Route Frequency Provider Last Rate    acetaminophen  650 mg Oral Q4H PRN Jordan C Holter, DO      acetaminophen  650 mg Oral Q6H PRN HOLLI Lion      aluminum-magnesium hydroxide-simethicone  30 mL Oral Q4H PRN Jordan C Holter, DO      amLODIPine  5 mg Oral Daily HOLLI Lion      Artificial Tears  1 drop Both Eyes Q3H PRN Jordan C Holter, DO      haloperidol lactate  2.5 mg Intramuscular Q4H PRN Max 4/day HOLLI Lion      And    LORazepam  1 mg Intramuscular Q4H PRN Max 4/day HOLLI Lion      And    benztropine  0.5 mg Intramuscular Q4H PRN Max 4/day HOLLI Lion      haloperidol lactate  5 mg Intramuscular Q4H PRN Max 4/day HOLLI Lion      And    LORazepam  2 mg Intramuscular Q4H PRN Max 4/day HOLLI Lion      And    benztropine  1 mg Intramuscular Q4H PRN Max 4/day HOLLI Lion      bisacodyl  10 mg Rectal Daily PRN HOLLI Lion      calcium carbonate  500 mg Oral BID PRN HOLLI Monge      cloZAPine  250 mg Oral HS Bora Rosario MD      hydrOXYzine HCL  50 mg Oral Q6H PRN Max 4/day Jordan C Holter,       Or    diphenhydrAMINE  50 mg Intramuscular Q6H PRN Jordan C Holter,       hydrOXYzine HCL  50 mg Oral Q6H PRN Max 4/day HOLLI Lion      Or    diphenhydrAMINE  50 mg  Intramuscular Q6H PRN HOLLI Lion      diphenhydrAMINE-zinc acetate   Topical BID PRN Eveline HOLLI Hunt      docusate sodium  100 mg Oral BID Jourdan Dickens, DO      escitalopram  20 mg Oral Daily HOLLI Lion      famotidine  20 mg Oral BID PRN EileenHOLLI Cummins      fluticasone  1 spray Each Nare Daily PRN HOLLI Monge      haloperidol  1 mg Oral Q6H PRN EvelineHOLLI Christy      haloperidol  2.5 mg Oral Q4H PRN Max 4/day HOLLI Lion      haloperidol  5 mg Oral Q4H PRN Max 4/day Eveline HOLLI Hunt      haloperidol  5 mg Oral BID Bora Rosario MD      hydroCHLOROthiazide  12.5 mg Oral Daily Pia Mantilla, HOLLI      hydrocortisone   Topical 4x Daily PRN HOLLI Lion      hydrOXYzine HCL  100 mg Oral Q6H PRN Max 4/day [facility] Holter, DO      Or    LORazepam  2 mg Intramuscular Q6H PRN [facility] Holter, DO      hydrOXYzine HCL  100 mg Oral Q6H PRN Max 4/day HOLLI Lion      Or    LORazepam  2 mg Intramuscular Q6H PRN HOLLI Lion      hydrOXYzine HCL  25 mg Oral Q6H PRN Max 4/day [facility] Holter, DO      ibuprofen  600 mg Oral Q8H PRN HOLLI Lion      influenza vaccine  0.5 mL Intramuscular Once Bora Rosario MD      loratadine  10 mg Oral Daily Brina Guillen MD      magnesium hydroxide  30 mL Oral Once Jourdan Dickens, DO      melatonin  3 mg Oral HS PRN Bora Rosario MD      melatonin  9 mg Oral HS Bora Rosario MD      methocarbamol  500 mg Oral Q6H PRN HOLLI Lion      multivitamin-minerals  1 tablet Oral Daily HOLLI Monge      nicotine polacrilex  2 mg Oral Q4H PRN Bora Rosario MD      OLANZapine  5 mg Oral Q4H PRN Max 3/day Ion C Holter, DO      Or    OLANZapine  2.5 mg Intramuscular Q4H PRN Max 3/day Ion C Holter, DO      OLANZapine  5 mg Oral Q3H PRN Max 3/day Ion C Holter, DO      Or    OLANZapine  5 mg Intramuscular Q3H PRN Max 3/day Ion C Holter, DO      OLANZapine  2.5 mg Oral Q4H PRN Max  "6/day Jordan C Holter, DO      ondansetron  4 mg Oral Q6H PRN HOLLI Lion      pantoprazole  20 mg Oral QAM Jordan C Holter, DO      polyethylene glycol  17 g Oral Daily PRN HOLLI Ghotra      polyethylene glycol  17 g Oral Daily Jourdan Dickens, DO      propranolol  10 mg Oral Q12H [facility] HOLLI Lion      saliva substitute  5 spray Mouth/Throat 4x Daily PRN Mary Jo Reich PA-C      senna-docusate sodium  1 tablet Oral Daily PRN Jordan C Holter, DO      senna-docusate sodium  2 tablet Oral HS Jourdan Dickens, DO      traZODone  50 mg Oral HS PRN HOLLI Lion      white petrolatum-mineral oil   Topical TID PRN HOLLI Lion         Risks, benefits and possible side effects of Medications:   Risks, benefits, and possible side effects of medications explained to patient and patient verbalizes understanding.      Dual Antipsychotic Rationale: Patient is on two antipsychotic medications due to multiple failed medication trials and inability to control symptoms on a single agent.    Mental Status Evaluation:  Appearance:  Alert but fatigued, casually dressed, looks stated age, overweight, in no acute distress, limited eye contact   Behavior:  Calm, polite, and cooperative   Speech:  Normal rate, fluent, clear, soft   Mood:  \"Fine\"   Affect:  Constricted, depressed   Thought Process:  organized, logical, linear, normal rate of thoughts   Associations: concrete associations   Thought Content:  paranoid ideation in the setting of auditory hallucinations making him feel unsafe if he were to leave the hospital    Perceptual Disturbances: Endorses ongoing auditory hallucinations that threatening to kill him and his family. Denies visual hallucinations or command auditory hallucinations. Does not appear to interact with internal stimuli.   Risk Potential: Suicidal ideation - None at present  Homicidal ideation - None at present  Potential for aggression - Not at present   Sensorium:  oriented to " person, place, and time/date   Memory:  recent and remote memory grossly intact   Consciousness:  alert   Attention/Concentration: attention span and concentration appear shorter than expected for age   Insight:  fair   Judgment: limited   Gait/Station: in bed   Motor Activity: no abnormal movements     Recommended Treatment:   See above for assessment and plan.    Counseling/Coordination of Care    Total unit time spent today was greater than 15 minutes. Greater than 50% of total time was spent with the patient and/or patient's relatives and/or coordination of patient's care.     Patient's rights, confidentiality, exceptions to confidentiality, use of electronic medical record including appropriate staff access to medical record regarding behavioral health services and consent to treatment were reviewed.    Note Share:     This note was not shared with the patient due to reasonable likelihood of causing patient harm     Sharon Lilly, PA-S

## 2025-02-05 NOTE — ASSESSMENT & PLAN NOTE
"Reviewed 2/5/25  Pt seen in room and reported \"okay\" mood. Still endorses AH of voices threatening that they are going to \"kill him\" and family. Denied SI/HI/VH. Slept/showered/ate well since yesterday. Nursing denies any acute behaviors.     Continue medication as follows:  Continue Clozapine 250 mg QHS for psychosis - increased 12/11/24 02/04: , BNP 10, CRP 10  02/04: ANC 4.42  To consider further careful titration  CBC w/diff and CK every 2 weeks  Continue haldol to 5 mg BID for psychosis   Continue Lexapro 20 mg daily for depression and anxiety  Continue Propanolol 10 mg BID for anxiety   Continue Melatonin 9 mg QHS for sleep  Continue Senna-Docusate sodium one 50 mg tablet QHS for constipation  Continue Zofran 4 mg tablet Q6H PRN for nausea   Continue PRN mouth kote for xerostomia     - Pt remains on dual antipsychotic Tx due to failure on monotherapy   .  - S/p ECT treatments.  - ACT team referral was made for him living back with his aunt once MA funding comes through from the [facility] and sister will try to reapply  No associated orders from this encounter found during lookback period of 72 hours.    "

## 2025-02-05 NOTE — ASSESSMENT & PLAN NOTE
Reviewed 2/5/25  Follows with medical team and given dietary counseling and need for exercise  No associated orders from this encounter found during lookback period of 72 hours.

## 2025-02-05 NOTE — ASSESSMENT & PLAN NOTE
Reviewed 2/5/25  Follows with medical team  Continue senna-docusate sodium dose per medical team.   Continue with regular bowel movements, had one bowel movement on 2/3/25  No associated orders from this encounter found during lookback period of 72 hours.

## 2025-02-06 PROCEDURE — 99232 SBSQ HOSP IP/OBS MODERATE 35: CPT | Performed by: PSYCHIATRY & NEUROLOGY

## 2025-02-06 RX ADMIN — NICOTINE POLACRILEX 2 MG: 2 GUM, CHEWING ORAL at 21:38

## 2025-02-06 RX ADMIN — HALOPERIDOL 5 MG: 5 TABLET ORAL at 17:18

## 2025-02-06 RX ADMIN — PROPRANOLOL HYDROCHLORIDE 10 MG: 10 TABLET ORAL at 21:16

## 2025-02-06 RX ADMIN — NICOTINE POLACRILEX 2 MG: 2 GUM, CHEWING ORAL at 12:54

## 2025-02-06 RX ADMIN — ESCITALOPRAM OXALATE 20 MG: 10 TABLET, FILM COATED ORAL at 08:53

## 2025-02-06 RX ADMIN — NICOTINE POLACRILEX 2 MG: 2 GUM, CHEWING ORAL at 08:54

## 2025-02-06 RX ADMIN — DOCUSATE SODIUM 100 MG: 100 CAPSULE, LIQUID FILLED ORAL at 08:53

## 2025-02-06 RX ADMIN — PANTOPRAZOLE SODIUM 20 MG: 20 TABLET, DELAYED RELEASE ORAL at 08:52

## 2025-02-06 RX ADMIN — NICOTINE POLACRILEX 2 MG: 2 GUM, CHEWING ORAL at 17:19

## 2025-02-06 RX ADMIN — LORATADINE 10 MG: 10 TABLET ORAL at 08:54

## 2025-02-06 RX ADMIN — MULTIPLE VITAMINS W/ MINERALS TAB 1 TABLET: TAB ORAL at 08:54

## 2025-02-06 RX ADMIN — MELATONIN TAB 3 MG 9 MG: 3 TAB at 21:16

## 2025-02-06 RX ADMIN — SENNOSIDES AND DOCUSATE SODIUM 2 TABLET: 50; 8.6 TABLET ORAL at 21:16

## 2025-02-06 RX ADMIN — DOCUSATE SODIUM 100 MG: 100 CAPSULE, LIQUID FILLED ORAL at 17:18

## 2025-02-06 RX ADMIN — HALOPERIDOL 5 MG: 5 TABLET ORAL at 08:52

## 2025-02-06 RX ADMIN — CLOZAPINE 250 MG: 25 TABLET ORAL at 21:16

## 2025-02-06 NOTE — ASSESSMENT & PLAN NOTE
Reviewed 2/6/25  On nicotine gum as needed and encouraged smoking cessation  Continue to encourage cessation upon discharge  No associated orders from this encounter found during lookback period of 72 hours.

## 2025-02-06 NOTE — PLAN OF CARE
Problem: Ineffective Coping  Goal: Identifies ineffective coping skills  Outcome: Progressing  Goal: Identifies healthy coping skills  Outcome: Progressing  Goal: Demonstrates healthy coping skills  Outcome: Progressing  Goal: Participates in unit activities  Description: Interventions:  - Provide therapeutic environment   - Provide required programming   - Redirect inappropriate behaviors   Outcome: Progressing   Continues working towards the goals

## 2025-02-06 NOTE — NURSING NOTE
Pt is visible in the milieu and social with select peers. He consumed 100 % of dinner. Took his medications without incidence. Pt is polite, pleasant, and cooperative. Pt endorses AH, anxiety, and depression. Denies SI/HI/VH. Attended all evening groups. No behavioral issues.

## 2025-02-06 NOTE — ASSESSMENT & PLAN NOTE
Reviewed 2/6/25  Follows with medical team and given dietary counseling and need for exercise  No associated orders from this encounter found during lookback period of 72 hours.

## 2025-02-06 NOTE — PROGRESS NOTES
02/06/25 0733   Team Meeting   Meeting Type Daily Rounds   Team Members Present   Team Members Present Physician;Nurse;;Other (Discipline and Name)   Physician Team Member Holter   Nursing Team Member Claudia   Social Work Team Member Jose J ORTEGA   Patient/Family Present   Patient Present No   Patient's Family Present No     Groups Participation  8/10.   Patient's compliant with medications. He's engaged in his treatment. He's pleasant and cooperative. Depressed with AH.

## 2025-02-06 NOTE — NURSING NOTE
Patient has been visible on the unit most of the shift.  Social with peers. Awaiting discharge.  Offers no complaints today. Appetite good. Medication compliant. Attended 2 groups  this shift. Denies suicidal ideations. He doesn't complain of auditory hallucinations unless he is asked about them. He will then admit he hears them telling him they are going to hurt him and his family.

## 2025-02-06 NOTE — ASSESSMENT & PLAN NOTE
Reviewed 2/6/25  Follows with medical team  No associated orders from this encounter found during lookback period of 72 hours.

## 2025-02-06 NOTE — ASSESSMENT & PLAN NOTE
Reviewed 2/6/25  Follows with medical  Continue famotidine 20 mg tablet BID per medical team   Continue pantoprazole EC 20 mg tablet every morning per medical team  This is a chronic condition, and is stable unless otherwise noted.  No associated orders from this encounter found during lookback period of 72 hours.

## 2025-02-06 NOTE — ASSESSMENT & PLAN NOTE
Reviewed 2/6/25  Follows with medical team  Continue senna-docusate sodium dose per medical team.   Continue with regular bowel movements  No associated orders from this encounter found during lookback period of 72 hours.

## 2025-02-06 NOTE — NURSING NOTE
Patient has been visible on the unit. Social with peers. Attends some groups. Offers no current complaints. Denies suicidal ideations. Medication compliant. Awaiting discharge.

## 2025-02-06 NOTE — NURSING NOTE
Received pt in bed at change of shift with eyes closed; chest movement noted.  Continues the same thus this far as per q 15 min room checks.   Will continue to monitor behavior, sleeping pattern and any medical issues that may arise.    0556;  Sleeping 7+ hrs thus this far

## 2025-02-06 NOTE — PROGRESS NOTES
"Progress Note - Behavioral Health   Name: Alberto Berumen 29 y.o. male I MRN: [id number]  Unit/Bed#: [facility] 112-02 I Date of Admission: 3/29/2024   Date of Service: 2/6/2025 I Hospital Day: 314     Assessment & Plan  Schizoaffective disorder, bipolar type (HCC)  Reviewed 2/6/25  Pt seen in room and reported feeling \"the same\" Still endorses AH of voices threatening that they are going to \"kill him\" and family. Denied SI/HI/VH. Will continue to monitor sleeping patterns and fatigue. Eating well, bathing, and having normal bowel movements.  Nursing denies any acute behaviors.     Continue medication as follows:  Continue Clozapine 250 mg QHS for psychosis - increased 12/11/24 02/04: , BNP 10, CRP 10  02/04: ANC 4.42  To consider further careful titration  CBC w/diff and CK every 2 weeks  Continue haldol to 5 mg BID for psychosis   Continue Lexapro 20 mg daily for depression and anxiety  Continue Propanolol 10 mg BID for anxiety   Continue Melatonin 9 mg QHS for sleep  Continue Senna-Docusate sodium one 50 mg tablet QHS for constipation  Continue Zofran 4 mg tablet Q6H PRN for nausea   Continue PRN mouth kote for xerostomia     - Pt remains on dual antipsychotic Tx due to failure on monotherapy   .  - S/p ECT treatments.  - ACT team referral was made for him living back with his aunt once MA funding comes through from the [facility] and sister will try to reapply  No associated orders from this encounter found during lookback period of 72 hours.    Chronic idiopathic constipation  Reviewed 2/6/25  Follows with medical team  Continue senna-docusate sodium dose per medical team.   Continue with regular bowel movements  No associated orders from this encounter found during lookback period of 72 hours.  GERD (gastroesophageal reflux disease)  Reviewed 2/6/25  Follows with medical  Continue famotidine 20 mg tablet BID per medical team   Continue pantoprazole EC 20 mg tablet every morning per medical " "team  This is a chronic condition, and is stable unless otherwise noted.  No associated orders from this encounter found during lookback period of 72 hours.  Tobacco abuse  Reviewed 2/6/25  On nicotine gum as needed and encouraged smoking cessation  Continue to encourage cessation upon discharge  No associated orders from this encounter found during lookback period of 72 hours.  Elevated hemoglobin A1c  Reviewed 2/6/25  Follows with medical team  No associated orders from this encounter found during lookback period of 72 hours.  Class 2 obesity in adult  Reviewed 2/6/25  Follows with medical team and given dietary counseling and need for exercise  No associated orders from this encounter found during lookback period of 72 hours.    Primary hypertension  Reviewed 2/6/25  Follows with medical team on hydrochlorothiazide and Norvasc  Blood pressure remains stable at this time.  No associated orders from this encounter found during lookback period of 72 hours.  Plan:  Continue prescribed psychotropic medication regimen  Currently on 201 commitment status  Continue to promote patient participation in therapeutic milieu.  Continue medical management per medicine.  Continue behavioral health checks q.15 minutes.   Continue vitals per behavioral health unit protocol.  Safety: Safety and communication plan established to target dynamic risk factors discussed above.   Discharge and disposition: Pending coordination with ACT services, patient will be discharged to his aunt's home.    SUBJECTIVE     Patient evaluated this a.m. for continuity of care. Today, Alberto states he is feeling \"the same\" as yesterday. He reports struggling with a general feeling of sadness from day to day and denies any triggers that increase his sadness. He continues to endorse auditory hallucinations that threaten to kill him that remain unchanged. In this context, he remains paranoid and states he would feel unsafe leaving the hospital at this " time.    Denies command hallucinations, visual hallucinations, suicidal ideation, or homicidal ideation at this time.       Alberto does not voice any concerns/acute issues this morning. He continues to endorse increased fatigue since starting haldol and has been sleeping more throughout the day. Denies side effects of his current psychiatric medications. Continues to eat 2/3 meals a day, bathing regularly, and had one bowel movement yesterday without difficulty.     Alberto plans to utilize his coping skills today such as distraction techniques and continue to attend groups throughout the day. He spoke with his sister yesterday which he enjoys and has a goal to exercise more each day.      PSYCHIATRIC REVIEW OF SYSTEMS     Behavior over the last 24 hours: unchanged  Sleep: hypersomnia  Appetite: normal  Medication side effects: No    Progress Toward Goals: Limited progress, as evidenced by their participation (or lack thereof) in individual, social and therapeutic milieu in addition to adherence to their medication regimen.  Patient Acceptance: Fair  Patient Understanding: Fair  Patient Involvement in Reintegration Process: Continues to be in contact with mother, sister, and aunt.   Patient's Therapeutic Relationship with Psychiatrist: Trusting   Patient's Optimism Regarding Recovery: Pending coordination with ACT services, planning discharge to aunt's home.     Group Attendance: 8/10    REVIEW OF SYSTEMS   Review of systems: no complaints    OBJECTIVE     Vital Signs in Past 24 Hours:  Temp:  [97.8 °F (36.6 °C)-97.9 °F (36.6 °C)] 97.8 °F (36.6 °C)  HR:  [79-88] 79  BP: (108-128)/(66-75) 108/68  Resp:  [18] 18  SpO2:  [98 %-100 %] 98 %  O2 Device: None (Room air)    Intake/Output in Past 24 hours:  No intake/output data recorded.  No intake/output data recorded.    Laboratory Results:  I have personally reviewed all pertinent laboratory/tests results.  Most Recent Labs:   Lab Results   Component Value Date    WBC  9.74 02/04/2025    RBC 5.54 02/04/2025    HGB 12.9 02/04/2025    HCT 43.5 02/04/2025     02/04/2025    RDW 14.1 02/04/2025    NEUTROABS 4.42 02/04/2025    SODIUM 140 10/16/2024    K 3.8 10/16/2024     10/16/2024    CO2 29 10/16/2024    BUN 9 10/16/2024    CREATININE 0.91 10/16/2024    GLUC 94 10/16/2024    GLUF 94 10/16/2024    CALCIUM 9.5 10/16/2024    AST 26 09/30/2024    ALT 42 09/30/2024    ALKPHOS 64 09/30/2024    TP 6.6 09/30/2024    ALB 3.8 09/30/2024    TBILI 0.47 09/30/2024    CHOLESTEROL 156 03/14/2024    HDL 44 03/14/2024    TRIG 133 03/14/2024    LDLCALC 85 03/14/2024    NONHDLC 112 03/14/2024    LITHIUM 0.61 01/09/2024    GRS2DMSNZDYS 1.062 11/15/2023    HGBA1C 5.0 04/01/2024    EAG 97 04/01/2024       Behavioral Health Medications: all current active meds have been reviewed.    Current Facility-Administered Medications   Medication Dose Route Frequency Provider Last Rate    acetaminophen  650 mg Oral Q4H PRN Jordan C Holter, DO      acetaminophen  650 mg Oral Q6H PRN HOLLI Lion      aluminum-magnesium hydroxide-simethicone  30 mL Oral Q4H PRN Jordan C Holter, DO      amLODIPine  5 mg Oral Daily HOLLI Lion      Artificial Tears  1 drop Both Eyes Q3H PRN Jordan C Holter, DO      haloperidol lactate  2.5 mg Intramuscular Q4H PRN Max 4/day HOLLI Lion      And    LORazepam  1 mg Intramuscular Q4H PRN Max 4/day HOLLI Lion      And    benztropine  0.5 mg Intramuscular Q4H PRN Max 4/day HOLLI Lion      haloperidol lactate  5 mg Intramuscular Q4H PRN Max 4/day HOLLI Lion      And    LORazepam  2 mg Intramuscular Q4H PRN Max 4/day HOLLI Lion      And    benztropine  1 mg Intramuscular Q4H PRN Max 4/day HOLLI Lion      bisacodyl  10 mg Rectal Daily PRN HOLLI Lion      calcium carbonate  500 mg Oral BID PRN HOLLI Monge      cloZAPine  250 mg Oral HS Bora Rosario MD      hydrOXYzine HCL  50 mg Oral Q6H PRN Max  4/day Jordan C Holter, DO      Or    diphenhydrAMINE  50 mg Intramuscular Q6H PRN Jordan C Holter, DO      hydrOXYzine HCL  50 mg Oral Q6H PRN Max 4/day HOLLI Lion      Or    diphenhydrAMINE  50 mg Intramuscular Q6H PRN HOLLI Lion      diphenhydrAMINE-zinc acetate   Topical BID PRN HOLLI Lion      docusate sodium  100 mg Oral BID Jourdan Dickens, DO      escitalopram  20 mg Oral Daily HOLLI Lion      famotidine  20 mg Oral BID PRN HOLLI Monge      fluticasone  1 spray Each Nare Daily PRN HOLLI Monge      haloperidol  1 mg Oral Q6H PRN HOLLI Lion      haloperidol  2.5 mg Oral Q4H PRN Max 4/day HOLLI Lion      haloperidol  5 mg Oral Q4H PRN Max 4/day HOLLI Lion      haloperidol  5 mg Oral BID Bora Rosario MD      hydroCHLOROthiazide  12.5 mg Oral Daily Pia Mantilla, HOLLI      hydrocortisone   Topical 4x Daily PRN HOLLI Lion      hydrOXYzine HCL  100 mg Oral Q6H PRN Max 4/day Jordan C Holter, DO      Or    LORazepam  2 mg Intramuscular Q6H PRN Jordan C Holter, DO      hydrOXYzine HCL  100 mg Oral Q6H PRN Max 4/day HOLLI Lion      Or    LORazepam  2 mg Intramuscular Q6H PRN HOLLI Lion      hydrOXYzine HCL  25 mg Oral Q6H PRN Max 4/day Jordan C Holter, DO      ibuprofen  600 mg Oral Q8H PRN HOLLI Lion      influenza vaccine  0.5 mL Intramuscular Once Bora Rosario MD      loratadine  10 mg Oral Daily Brina Guillen MD      magnesium hydroxide  30 mL Oral Once Jourdan Dickens, DO      melatonin  3 mg Oral HS PRN Bora Rosario MD      melatonin  9 mg Oral HS Bora Rosario MD      methocarbamol  500 mg Oral Q6H PRN HOLLI Lion      multivitamin-minerals  1 tablet Oral Daily HOLLI Monge      nicotine polacrilex  2 mg Oral Q4H PRN Bora Rosario MD      OLANZapine  5 mg Oral Q4H PRN Max 3/day Ion C Holter, DO      Or    OLANZapine  2.5 mg Intramuscular Q4H PRN Max 3/day  "Jordan C Holter, DO      OLANZapine  5 mg Oral Q3H PRN Max 3/day Jordan C Holter, DO      Or    OLANZapine  5 mg Intramuscular Q3H PRN Max 3/day Ion Dupontter, DO      OLANZapine  2.5 mg Oral Q4H PRN Max 6/day Ion Dupontter, DO      ondansetron  4 mg Oral Q6H PRN HOLLI Lion      pantoprazole  20 mg Oral QAM Jordan C Holter, DO      polyethylene glycol  17 g Oral Daily PRN HOLLI Ghotra      polyethylene glycol  17 g Oral Daily Jourdan Dickens, DO      propranolol  10 mg Oral Q12H [facility] HOLLI Lion      saliva substitute  5 spray Mouth/Throat 4x Daily PRN Mary Jo Reich PA-C      senna-docusate sodium  1 tablet Oral Daily PRN Jordan C Holter, DO      senna-docusate sodium  2 tablet Oral HS Jourdan Dickens, DO      traZODone  50 mg Oral HS PRN HOLLI Lino      white petrolatum-mineral oil   Topical TID PRN HOLLI Lion         Risks, benefits and possible side effects of Medications:   Risks, benefits, and possible side effects of medications explained to patient and patient verbalizes understanding.      Dual Antipsychotic Rationale: Patient is on a dual antipsychotic due to multiple failed medication trails and inability to control symptoms on a singular agent.     Mental Status Evaluation:  Appearance:  Alert but fatigued, dressed in pajamas, laying comfortably in bed, limited eye contact,appears stated age, in no acute distress   Behavior:  Calm, polite, and cooperative   Speech:  Normal rate, fluent, clear, soft   Mood:  \"Same\"    Affect:  Depressed, constricted   Thought Process:  Organized, logical, linear, normal flow/rate of thoughts   Associations: [place] associations   Thought Content:  Paranoid ideation in the setting of auditory hallucinations that make him feel unsafe to go home.   Perceptual Disturbances: Ongoing auditory hallucinations threatening to kill him and his family. Denies visual hallucinations or command hallucinations. Does not appear in interact with " internal stimuli.   Risk Potential: Suicidal ideation - Denies  Homicidal ideation - Denies  Potential for aggression - Not at present   Sensorium:  oriented to person, place, and time/date   Memory:  recent and remote memory grossly intact   Consciousness:  alert   Attention/Concentration: attention span and concentration appear shorter than expected for age   Insight:  fair   Judgment: Limited    Gait/Station: in bed   Motor Activity: no abnormal movements     Recommended Treatment:   See above for assessment and plan.    Counseling/Coordination of Care    Total unit time spent today was greater than 15 minutes. Greater than 50% of total time was spent with the patient and/or patient's relatives and/or coordination of patient's care.     Patient's rights, confidentiality, exceptions to confidentiality, use of electronic medical record including appropriate staff access to medical record regarding behavioral health services and consent to treatment were reviewed.    Note Share:     This note was not shared with the patient due to reasonable likelihood of causing patient harm     Sharon Pierre   Psychiatry, PA-S

## 2025-02-06 NOTE — PLAN OF CARE
Problem: Alteration in Thoughts and Perception  Goal: Agree to be compliant with medication regime, as prescribed and report medication side effects  Description: Interventions:  - Offer appropriate PRN medication and supervise ingestion; conduct AIMS, as needed   Outcome: Progressing  Goal: Recognize dysfunctional thoughts, communicate reality-based thoughts at the time of discharge  Description: Interventions:  - Provide medication and psycho-education to assist patient in compliance and developing insight into his/her illness   Outcome: Progressing     Problem: Ineffective Coping  Goal: Participates in unit activities  Description: Interventions:  - Provide therapeutic environment   - Provide required programming   - Redirect inappropriate behaviors   Outcome: Progressing  Goal: Patient/Family participate in treatment and DC plans  Description: Interventions:  - Provide therapeutic environment  Outcome: Progressing  Goal: Patient/Family verbalizes awareness of resources  Outcome: Progressing     Problem: Depression  Goal: Treatment Goal: Demonstrate behavioral control of depressive symptoms, verbalize feelings of improved mood/affect, and adopt new coping skills prior to discharge  Outcome: Progressing  Goal: Refrain from harming self  Description: Interventions:  - Monitor patient closely, per order   - Supervise medication ingestion, monitor effects and side effects   Outcome: Progressing  Goal: Refrain from isolation  Description: Interventions:  - Develop a trusting relationship   - Encourage socialization   Outcome: Progressing  Goal: Refrain from self-neglect  Outcome: Progressing     Problem: Anxiety  Goal: Anxiety is at manageable level  Description: Interventions:  - Assess and monitor patient's anxiety level.   - Monitor for signs and symptoms (heart palpitations, chest pain, shortness of breath, headaches, nausea, feeling jumpy, restlessness, irritable, apprehensive).   - Collaborate with  interdisciplinary team and initiate plan and interventions as ordered.  - [place] patient to unit/surroundings  - Explain treatment plan  - Encourage participation in care  - Encourage verbalization of concerns/fears  - Identify coping mechanisms  - Assist in developing anxiety-reducing skills  - Administer/offer alternative therapies  - Limit or eliminate stimulants  Outcome: Progressing     Problem: Alteration in Orientation  Goal: Treatment Goal: Demonstrate a reduction of confusion and improved orientation to person, place, time and/or situation upon discharge, according to optimum baseline/ability  Outcome: Progressing  Goal: Interact with staff daily  Description: Interventions:  - Assess and re-assess patient's level of orientation  - Engage patient in 1 on 1 interactions, daily, for a minimum of 15 minutes   - Establish rapport/trust with patient   Outcome: Progressing  Goal: Allow medical examinations, as recommended  Description: Interventions:  - Provide physical/neurological exams and/or referrals, per provider   Outcome: Progressing  Goal: Cooperate with recommended testing/procedures  Description: Interventions:  - Determine need for ancillary testing  - Observe for mental status changes  - Implement falls/precaution protocol   Outcome: Progressing  Goal: Complete daily ADLs, including personal hygiene independently, as able  Description: Interventions:  - Observe, teach, and assist patient with ADLS  Outcome: Progressing     Problem: DISCHARGE PLANNING  Goal: Discharge to home with appropriate resources  Description: INTERVENTIONS:  - Identify barriers to discharge w/patient and caregiver  - Arrange for needed discharge resources and transportation as appropriate  - Identify discharge learning needs (meds, wound care, etc.)  - Refer to Case Management Department for coordinating discharge planning if the patient needs post-hospital services based on physician/advanced practitioner order or complex  needs related to functional status, cognitive ability, or social support system  Outcome: Progressing     Problem: Alteration in Thoughts and Perception  Goal: Treatment Goal: Gain control of psychotic behaviors/thinking, reduce/eliminate presenting symptoms and demonstrate improved reality functioning upon discharge  Outcome: Not Progressing  Goal: Refrain from acting on delusional thinking/internal stimuli  Description: Interventions:  - Monitor patient closely, per order   - Utilize least restrictive measures   - Set reasonable limits, give positive feedback for acceptable   - Administer medications as ordered and monitor of potential side effects  Outcome: Not Progressing

## 2025-02-06 NOTE — ASSESSMENT & PLAN NOTE
"Reviewed 2/6/25  Pt seen in room and reported feeling \"the same\" Still endorses AH of voices threatening that they are going to \"kill him\" and family. Denied SI/HI/VH. Will continue to monitor sleeping patterns and fatigue. Eating well, bathing, and having normal bowel movements.  Nursing denies any acute behaviors.     Continue medication as follows:  Continue Clozapine 250 mg QHS for psychosis - increased 12/11/24 02/04: , BNP 10, CRP 10  02/04: ANC 4.42  To consider further careful titration  CBC w/diff and CK every 2 weeks  Continue haldol to 5 mg BID for psychosis   Continue Lexapro 20 mg daily for depression and anxiety  Continue Propanolol 10 mg BID for anxiety   Continue Melatonin 9 mg QHS for sleep  Continue Senna-Docusate sodium one 50 mg tablet QHS for constipation  Continue Zofran 4 mg tablet Q6H PRN for nausea   Continue PRN mouth kote for xerostomia     - Pt remains on dual antipsychotic Tx due to failure on monotherapy   .  - S/p ECT treatments.  - ACT team referral was made for him living back with his aunt once MA funding comes through from the [facility] and sister will try to reapply  No associated orders from this encounter found during lookback period of 72 hours.    "

## 2025-02-06 NOTE — ASSESSMENT & PLAN NOTE
Reviewed 2/6/25  Follows with medical team on hydrochlorothiazide and Norvasc  Blood pressure remains stable at this time.  No associated orders from this encounter found during lookback period of 72 hours.

## 2025-02-07 PROCEDURE — 99232 SBSQ HOSP IP/OBS MODERATE 35: CPT | Performed by: PSYCHIATRY & NEUROLOGY

## 2025-02-07 RX ADMIN — NICOTINE POLACRILEX 2 MG: 2 GUM, CHEWING ORAL at 12:41

## 2025-02-07 RX ADMIN — PROPRANOLOL HYDROCHLORIDE 10 MG: 10 TABLET ORAL at 21:32

## 2025-02-07 RX ADMIN — NICOTINE POLACRILEX 2 MG: 2 GUM, CHEWING ORAL at 08:44

## 2025-02-07 RX ADMIN — SENNOSIDES AND DOCUSATE SODIUM 2 TABLET: 50; 8.6 TABLET ORAL at 21:31

## 2025-02-07 RX ADMIN — PROPRANOLOL HYDROCHLORIDE 10 MG: 10 TABLET ORAL at 08:43

## 2025-02-07 RX ADMIN — MULTIPLE VITAMINS W/ MINERALS TAB 1 TABLET: TAB ORAL at 08:44

## 2025-02-07 RX ADMIN — AMLODIPINE BESYLATE 5 MG: 5 TABLET ORAL at 08:43

## 2025-02-07 RX ADMIN — ESCITALOPRAM OXALATE 20 MG: 10 TABLET, FILM COATED ORAL at 08:44

## 2025-02-07 RX ADMIN — CLOZAPINE 250 MG: 25 TABLET ORAL at 21:32

## 2025-02-07 RX ADMIN — DOCUSATE SODIUM 100 MG: 100 CAPSULE, LIQUID FILLED ORAL at 17:33

## 2025-02-07 RX ADMIN — MELATONIN TAB 3 MG 9 MG: 3 TAB at 21:31

## 2025-02-07 RX ADMIN — HYDROCHLOROTHIAZIDE 12.5 MG: 12.5 TABLET ORAL at 08:43

## 2025-02-07 RX ADMIN — LORATADINE 10 MG: 10 TABLET ORAL at 08:42

## 2025-02-07 RX ADMIN — DOCUSATE SODIUM 100 MG: 100 CAPSULE, LIQUID FILLED ORAL at 08:43

## 2025-02-07 RX ADMIN — PANTOPRAZOLE SODIUM 20 MG: 20 TABLET, DELAYED RELEASE ORAL at 08:44

## 2025-02-07 RX ADMIN — HALOPERIDOL 5 MG: 5 TABLET ORAL at 17:33

## 2025-02-07 RX ADMIN — NICOTINE POLACRILEX 2 MG: 2 GUM, CHEWING ORAL at 17:33

## 2025-02-07 RX ADMIN — HALOPERIDOL 5 MG: 5 TABLET ORAL at 08:44

## 2025-02-07 RX ADMIN — NICOTINE POLACRILEX 2 MG: 2 GUM, CHEWING ORAL at 21:32

## 2025-02-07 NOTE — ASSESSMENT & PLAN NOTE
Reviewed  2/7/25  Follows with medical team  Continue senna-docusate sodium dose per medical team.   Continue with regular bowel movements  No associated orders from this encounter found during lookback period of 72 hours.

## 2025-02-07 NOTE — ASSESSMENT & PLAN NOTE
Reviewed  2/7/25  Follows with medical team on hydrochlorothiazide and Norvasc  Blood pressure remains stable at this time.  No associated orders from this encounter found during lookback period of 72 hours.

## 2025-02-07 NOTE — ASSESSMENT & PLAN NOTE
"Reviewed 2/7/25  Pt seen in room and reported feeling \"tired\" today.  Still endorses AH of voices threatening that they are going to \"kill him\" that remain unchanged. Feeling more \"sad\" than usual but does not expand upon that. Denied SI/HI/VH. Will continue to monitor sleeping patterns and increased fatigue. Eating well, bathing, and having normal bowel movements.  Nursing denies any acute behaviors.     Continue medication as follows:  Continue Clozapine 250 mg QHS for psychosis - increased 12/11/24 02/04: , BNP 10, CRP 9.5  02/04: ANC 4.42  To consider further careful titration  CBC w/diff and CK every 2 weeks  Continue haldol to 5 mg BID for psychosis   Continue Lexapro 20 mg daily for depression and anxiety  Continue Propanolol 10 mg BID for anxiety   Continue Melatonin 9 mg QHS for sleep  Continue Colace and Senna-Docusate sodium 50 mg tablet QHS for constipation  Continue Zofran 4 mg tablet Q6H PRN for nausea   Continue PRN mouth kote for xerostomia     - Pt remains on dual antipsychotic Tx due to failure on monotherapy   .  - S/p ECT treatments.  - ACT team referral was made for him living back with his aunt once MA funding comes through from the [facility] and sister will try to reapply  No associated orders from this encounter found during lookback period of 72 hours.    "

## 2025-02-07 NOTE — NURSING NOTE
Patient is visible on unit, pleasant and cooperative with care. Pt reports no s/s, voices no concerns at this time. Pt with only one group attended so far today. Pt takes all medications without issue.

## 2025-02-07 NOTE — PROGRESS NOTES
02/07/25 0810   Team Meeting   Meeting Type Daily Rounds   Team Members Present   Team Members Present Physician;Nurse;;Other (Discipline and Name)   Physician Team Member Te DE LA GARZA   Nursing Team Member Claudia   Social Work Team Member Jose J ORTEGA   Other (Discipline and Name) Wang PCM   Patient/Family Present   Patient Present No   Patient's Family Present No     Groups Participation  8/10.   Patient reports increased depression and isolation. He has decreased engagement in his treatment. He's compliant with medications. He's social and pleasant at times.

## 2025-02-07 NOTE — ASSESSMENT & PLAN NOTE
Reviewed 2/7/25  On nicotine gum as needed and encouraged smoking cessation  Continue to encourage cessation upon discharge  No associated orders from this encounter found during lookback period of 72 hours.

## 2025-02-07 NOTE — PLAN OF CARE
Problem: Alteration in Thoughts and Perception  Goal: Treatment Goal: Gain control of psychotic behaviors/thinking, reduce/eliminate presenting symptoms and demonstrate improved reality functioning upon discharge  Outcome: Progressing  Goal: Agree to be compliant with medication regime, as prescribed and report medication side effects  Description: Interventions:  - Offer appropriate PRN medication and supervise ingestion; conduct AIMS, as needed   Outcome: Progressing  Goal: Recognize dysfunctional thoughts, communicate reality-based thoughts at the time of discharge  Description: Interventions:  - Provide medication and psycho-education to assist patient in compliance and developing insight into his/her illness   Outcome: Progressing     Problem: Ineffective Coping  Goal: Participates in unit activities  Description: Interventions:  - Provide therapeutic environment   - Provide required programming   - Redirect inappropriate behaviors   Outcome: Progressing  Goal: Patient/Family participate in treatment and DC plans  Description: Interventions:  - Provide therapeutic environment  Outcome: Progressing  Goal: Patient/Family verbalizes awareness of resources  Outcome: Progressing     Problem: Depression  Goal: Treatment Goal: Demonstrate behavioral control of depressive symptoms, verbalize feelings of improved mood/affect, and adopt new coping skills prior to discharge  Outcome: Progressing  Goal: Verbalize thoughts and feelings  Description: Interventions:  - Assess and re-assess patient's level of risk   - Engage patient in 1:1 interactions, daily, for a minimum of 15 minutes   - Encourage patient to express feelings, fears, frustrations, hopes   Outcome: Progressing  Goal: Refrain from harming self  Description: Interventions:  - Monitor patient closely, per order   - Supervise medication ingestion, monitor effects and side effects   Outcome: Progressing  Goal: Refrain from isolation  Description:  Interventions:  - Develop a trusting relationship   - Encourage socialization   Outcome: Progressing  Goal: Refrain from self-neglect  Outcome: Progressing     Problem: Anxiety  Goal: Anxiety is at manageable level  Description: Interventions:  - Assess and monitor patient's anxiety level.   - Monitor for signs and symptoms (heart palpitations, chest pain, shortness of breath, headaches, nausea, feeling jumpy, restlessness, irritable, apprehensive).   - Collaborate with interdisciplinary team and initiate plan and interventions as ordered.  - Sc[place]ey patient to unit/surroundings  - Explain treatment plan  - Encourage participation in care  - Encourage verbalization of concerns/fears  - Identify coping mechanisms  - Assist in developing anxiety-reducing skills  - Administer/offer alternative therapies  - Limit or eliminate stimulants  Outcome: Progressing     Problem: Alteration in Orientation  Goal: Treatment Goal: Demonstrate a reduction of confusion and improved orientation to person, place, time and/or situation upon discharge, according to optimum baseline/ability  Outcome: Progressing  Goal: Interact with staff daily  Description: Interventions:  - Assess and re-assess patient's level of orientation  - Engage patient in 1 on 1 interactions, daily, for a minimum of 15 minutes   - Establish rapport/trust with patient   Outcome: Progressing  Goal: Express concerns related to confused thinking related to:  Description: Interventions:  - Encourage patient to express feelings, fears, frustrations, hopes  - Assign consistent caregivers   - Sc[place]ey/re-orient patient as needed  - Allow comfort items, as appropriate  - Provide visual cues, signs, etc.   Outcome: Progressing  Goal: Allow medical examinations, as recommended  Description: Interventions:  - Provide physical/neurological exams and/or referrals, per provider   Outcome: Progressing  Goal: Cooperate with recommended testing/procedures  Description:  Interventions:  - Determine need for ancillary testing  - Observe for mental status changes  - Implement falls/precaution protocol   Outcome: Progressing  Goal: Complete daily ADLs, including personal hygiene independently, as able  Description: Interventions:  - Observe, teach, and assist patient with ADLS  Outcome: Progressing     Problem: DISCHARGE PLANNING  Goal: Discharge to home with appropriate resources  Description: INTERVENTIONS:  - Identify barriers to discharge w/patient and caregiver  - Arrange for needed discharge resources and transportation as appropriate  - Identify discharge learning needs (meds, wound care, etc.)  - Refer to Case Management Department for coordinating discharge planning if the patient needs post-hospital services based on physician/advanced practitioner order or complex needs related to functional status, cognitive ability, or social support system  Outcome: Progressing     Problem: Alteration in Thoughts and Perception  Goal: Refrain from acting on delusional thinking/internal stimuli  Description: Interventions:  - Monitor patient closely, per order   - Utilize least restrictive measures   - Set reasonable limits, give positive feedback for acceptable   - Administer medications as ordered and monitor of potential side effects  Outcome: Not Progressing

## 2025-02-07 NOTE — NURSING NOTE
Alberto is visible on unit. Social with peers.  Compliant with meds and meals. Nicorette gum at  1743 and 2132  No behavior issues.

## 2025-02-07 NOTE — NURSING NOTE
Received pt in bed at change of shift with eyes closed; chest movement noted.  Continues the same thus this far as per q 15 min room checks.   Will continue to monitor behavior, sleeping pattern and any medical issues that may arise.    0541:  Sleeping 7+ hrs thus this far

## 2025-02-07 NOTE — ASSESSMENT & PLAN NOTE
Reviewed  2/7/25  Follows with medical team and given dietary counseling and need for exercise  No associated orders from this encounter found during lookback period of 72 hours.

## 2025-02-07 NOTE — PROGRESS NOTES
"Progress Note - Behavioral Health   Name: Alberto Berumen 29 y.o. male I MRN: [id number]  Unit/Bed#: [facility] 112-02 I Date of Admission: 3/29/2024   Date of Service: 2/7/2025 I Hospital Day: 315     Assessment & Plan  Schizoaffective disorder, bipolar type (HCC)  Reviewed 2/7/25  Pt seen in room and reported feeling \"tired\" today.  Still endorses AH of voices threatening that they are going to \"kill him\" that remain unchanged. Feeling more \"sad\" than usual but does not expand upon that. Denied SI/HI/VH. Will continue to monitor sleeping patterns and increased fatigue. Eating well, bathing, and having normal bowel movements.  Nursing denies any acute behaviors.     Continue medication as follows:  Continue Clozapine 250 mg QHS for psychosis - increased 12/11/24 02/04: , BNP 10, CRP 9.5  02/04: ANC 4.42  To consider further careful titration  CBC w/diff and CK every 2 weeks  Continue haldol to 5 mg BID for psychosis   Continue Lexapro 20 mg daily for depression and anxiety  Continue Propanolol 10 mg BID for anxiety   Continue Melatonin 9 mg QHS for sleep  Continue Colace and Senna-Docusate sodium 50 mg tablet QHS for constipation  Continue Zofran 4 mg tablet Q6H PRN for nausea   Continue PRN mouth kote for xerostomia     - Pt remains on dual antipsychotic Tx due to failure on monotherapy   .  - S/p ECT treatments.  - ACT team referral was made for him living back with his aunt once MA funding comes through from the [facility] and sister will try to reapply  No associated orders from this encounter found during lookback period of 72 hours.    Chronic idiopathic constipation  Reviewed  2/7/25  Follows with medical team  Continue senna-docusate sodium dose per medical team.   Continue with regular bowel movements  No associated orders from this encounter found during lookback period of 72 hours.  GERD (gastroesophageal reflux disease)  Reviewed 2/7/25  Follows with medical  Continue famotidine 20 mg tablet " "BID per medical team   Continue pantoprazole EC 20 mg tablet every morning per medical team  This is a chronic condition, and is stable unless otherwise noted.  No associated orders from this encounter found during lookback period of 72 hours.  Tobacco abuse  Reviewed 2/7/25  On nicotine gum as needed and encouraged smoking cessation  Continue to encourage cessation upon discharge  No associated orders from this encounter found during lookback period of 72 hours.  Elevated hemoglobin A1c  Reviewed  2/7/25  Follows with medical team  No associated orders from this encounter found during lookback period of 72 hours.  Class 2 obesity in adult  Reviewed  2/7/25  Follows with medical team and given dietary counseling and need for exercise  No associated orders from this encounter found during lookback period of 72 hours.    Primary hypertension  Reviewed  2/7/25  Follows with medical team on hydrochlorothiazide and Norvasc  Blood pressure remains stable at this time.  No associated orders from this encounter found during lookback period of 72 hours.    Plan:  Continue prescribed psychotropic medication regimen  Currently on 201 commitment status  Continue to promote patient participation in therapeutic milieu.  Continue medical management per medicine.  Continue behavioral health checks q.15 minutes.   Continue vitals per behavioral health unit protocol.  Safety: Safety and communication plan established to target dynamic risk factors discussed above.   Discharge and disposition: Plans to discharge to patients aunt's home, currently awaiting coordination with ACT services.   SUBJECTIVE     Patient evaluated this a.m. for continuity of care. Alberto states he feels \"tired\" today when asked about his mood. He reports increased feelings of sadness but denies any triggers/events associated with these feelings. He was apologetic during the interview do to his minimal responses to questions which he states was limited by how " "fatigued he is.     Alberto continues to endorse auditory hallucinations of male/female voices threatening to \"kill him\" which he states remain unchanged in nature. Denies command hallucinations or visual hallucinations. Denies suicidal ideation or homicidal ideation at this time.     Alberto does not speak on any acute concerns/issues at this time. He continues to report increased fatigue. Denies medication side effects. Eating 2/3 meals, has regular bowel movements without difficulty, and is bathing regularly.    He plans on attending groups throughout the day and remains in contact with family members such as his sister, mother, and aunt.    PSYCHIATRIC REVIEW OF SYSTEMS     Behavior over the last 24 hours:  unchanged  Sleep: hypersomnia  Appetite: normal  Medication side effects: No    Progress Toward Goals: Limited progress, as evidenced by their participation (or lack thereof) in individual, social and therapeutic milieu in addition to adherence to their medication regimen.  Patient Acceptance: Fair  Patient Understanding: Fair   Patient Involvement in Reintegration Process: Remains in contact with sister, mother, and with recent phone call to his sister made on 2/5/25  Patient's Therapeutic Relationship with Psychiatrist: Trusting  Patient's Optimism Regarding Recovery: Planning discharge to aunt's home following coordination with ACT services.  Group Attendance: 8/10    REVIEW OF SYSTEMS   Review of systems: no complaints    OBJECTIVE     Vital Signs in Past 24 Hours:  Temp:  [97.8 °F (36.6 °C)-98 °F (36.7 °C)] 97.8 °F (36.6 °C)  HR:  [77-80] 77  BP: (110-140)/(67-85) 126/67  Resp:  [17-18] 17  SpO2:  [96 %] 96 %  O2 Device: None (Room air)    Intake/Output in Past 24 hours:  No intake/output data recorded.  No intake/output data recorded.        Laboratory Results:  I have personally reviewed all pertinent laboratory/tests results.  Most Recent Labs:   Lab Results   Component Value Date    WBC 9.74 " 02/04/2025    RBC 5.54 02/04/2025    HGB 12.9 02/04/2025    HCT 43.5 02/04/2025     02/04/2025    RDW 14.1 02/04/2025    NEUTROABS 4.42 02/04/2025    SODIUM 140 10/16/2024    K 3.8 10/16/2024     10/16/2024    CO2 29 10/16/2024    BUN 9 10/16/2024    CREATININE 0.91 10/16/2024    GLUC 94 10/16/2024    GLUF 94 10/16/2024    CALCIUM 9.5 10/16/2024    AST 26 09/30/2024    ALT 42 09/30/2024    ALKPHOS 64 09/30/2024    TP 6.6 09/30/2024    ALB 3.8 09/30/2024    TBILI 0.47 09/30/2024    CHOLESTEROL 156 03/14/2024    HDL 44 03/14/2024    TRIG 133 03/14/2024    LDLCALC 85 03/14/2024    NONHDLC 112 03/14/2024    LITHIUM 0.61 01/09/2024    KMS2XDGALZVX 1.062 11/15/2023    HGBA1C 5.0 04/01/2024    EAG 97 04/01/2024       Behavioral Health Medications: all current active meds have been reviewed.    Current Facility-Administered Medications   Medication Dose Route Frequency Provider Last Rate    acetaminophen  650 mg Oral Q4H PRN Jordan C Holter, DO      acetaminophen  650 mg Oral Q6H PRN HOLLI Lion      aluminum-magnesium hydroxide-simethicone  30 mL Oral Q4H PRN Jordan C Holter, DO      amLODIPine  5 mg Oral Daily HOLLI Lion      Artificial Tears  1 drop Both Eyes Q3H PRN Jordan C Holter, DO      haloperidol lactate  2.5 mg Intramuscular Q4H PRN Max 4/day HOLLI Lion      And    LORazepam  1 mg Intramuscular Q4H PRN Max 4/day HOLLI Lion      And    benztropine  0.5 mg Intramuscular Q4H PRN Max 4/day HOLLI Lion      haloperidol lactate  5 mg Intramuscular Q4H PRN Max 4/day HOLLI Lion      And    LORazepam  2 mg Intramuscular Q4H PRN Max 4/day HOLLI Lion      And    benztropine  1 mg Intramuscular Q4H PRN Max 4/day HOLLI Lion      bisacodyl  10 mg Rectal Daily PRN HOLLI Lion      calcium carbonate  500 mg Oral BID PRN HOLLI Monge      cloZAPine  250 mg Oral HS Bora Rosario MD      hydrOXYzine HCL  50 mg Oral Q6H PRN Max 4/day  Jordan C Holter, DO      Or    diphenhydrAMINE  50 mg Intramuscular Q6H PRN Jordan C Holter, DO      hydrOXYzine HCL  50 mg Oral Q6H PRN Max 4/day HOLLI Lion      Or    diphenhydrAMINE  50 mg Intramuscular Q6H PRN HOLLI Lion      diphenhydrAMINE-zinc acetate   Topical BID PRN HOLLI Lion      docusate sodium  100 mg Oral BID Jourdan Dickens, DO      escitalopram  20 mg Oral Daily HOLLI Lion      famotidine  20 mg Oral BID PRN HOLLI Monge      fluticasone  1 spray Each Nare Daily PRN HOLLI Monge      haloperidol  1 mg Oral Q6H PRN HOLLI Lion      haloperidol  2.5 mg Oral Q4H PRN Max 4/day HOLLI Lion      haloperidol  5 mg Oral Q4H PRN Max 4/day HOLLI Lion      haloperidol  5 mg Oral BID Bora Rosario MD      hydroCHLOROthiazide  12.5 mg Oral Daily Pia Mantilla, HOLLI      hydrocortisone   Topical 4x Daily PRN HOLLI Lion      hydrOXYzine HCL  100 mg Oral Q6H PRN Max 4/day Jordan C Holter, DO      Or    LORazepam  2 mg Intramuscular Q6H PRN Jordan C Holter, DO      hydrOXYzine HCL  100 mg Oral Q6H PRN Max 4/day HOLLI Lion      Or    LORazepam  2 mg Intramuscular Q6H PRN HOLLI Lion      hydrOXYzine HCL  25 mg Oral Q6H PRN Max 4/day Jordan C Holter, DO      ibuprofen  600 mg Oral Q8H PRN HOLLI Lion      influenza vaccine  0.5 mL Intramuscular Once Bora Rosario MD      loratadine  10 mg Oral Daily Brina Guillen MD      magnesium hydroxide  30 mL Oral Once Jourdan Dickens DO      melatonin  3 mg Oral HS PRN Broa Rosario MD      melatonin  9 mg Oral HS Bora Rosario MD      methocarbamol  500 mg Oral Q6H PRN HOLLI Lion      multivitamin-minerals  1 tablet Oral Daily HOLLI Monge      nicotine polacrilex  2 mg Oral Q4H PRN Bora Rosario MD      OLANZapine  5 mg Oral Q4H PRN Max 3/day Ion C Holter, DO      Or    OLANZapine  2.5 mg Intramuscular Q4H PRN Max 3/day  "Jordan C Holter, DO      OLANZapine  5 mg Oral Q3H PRN Max 3/day Jordan C Holter, DO      Or    OLANZapine  5 mg Intramuscular Q3H PRN Max 3/day Ion Dupontter, DO      OLANZapine  2.5 mg Oral Q4H PRN Max 6/day Ion Dupontter, DO      ondansetron  4 mg Oral Q6H PRN HOLLI Lion      pantoprazole  20 mg Oral QAM Jordan C Holter, DO      polyethylene glycol  17 g Oral Daily PRN HOLLI Ghotra      polyethylene glycol  17 g Oral Daily Jourdan Dickens, DO      propranolol  10 mg Oral Q12H [facility] HOLLI Lion      saliva substitute  5 spray Mouth/Throat 4x Daily PRN Mary Jo Reich PA-C      senna-docusate sodium  1 tablet Oral Daily PRN Jordan C Holter, DO      senna-docusate sodium  2 tablet Oral HS Jourdan Dickens, DO      traZODone  50 mg Oral HS PRN HOLLI Lion      white petrolatum-mineral oil   Topical TID PRN HOLLI Lion         Risks, benefits and possible side effects of Medications:   Risks, benefits, and possible side effects of medications explained to patient and patient verbalizes understanding.      Dual Antipsychotic Rationale: Patient is requiring dual antipsychotic therapy due to multiple failed medication trails and inability to control symptoms on a singular agent.     Mental Status Evaluation:  Appearance:  Causally dressed, laying in bed, alertness limited by fatigue, appears stated age, limited eye contact, in no acute distress   Behavior:  pleasant, cooperative, calm   Speech:  normal rate, fluent, clear, soft   Mood:  \"Tired\"   Affect:  constricted, depressed   Thought Process:  organized, logical, linear, normal rate of thoughts   Associations: concrete associations   Thought Content:  paranoid ideation in the setting of auditory hallucinations   Perceptual Disturbances: Ongoing auditory hallucinations of male and female voices saying they are going to kill him/hurt him, no command hallucinations. Denies visual hallucinations. Does not appear to respond to " internal stimuli.   Risk Potential: Suicidal ideation - Denies  Homicidal ideation - Denies  Potential for aggression - Not at present    Sensorium:  Oriented to person, place, time, and situation.   Memory:  recent and remote memory grossly intact   Consciousness:  Limited by fatigue, in and out of sleep during interview.   Attention/Concentration: attention span and concentration appear shorter than expected for age   Insight:  fair   Judgment: limited   Gait/Station: in bed   Motor Activity: no abnormal movements     Recommended Treatment:   See above for assessment and plan.    Counseling/Coordination of Care    Total unit time spent today was greater than 15 minutes. Greater than 50% of total time was spent with the patient and/or patient's relatives and/or coordination of patient's care.     Patient's rights, confidentiality, exceptions to confidentiality, use of electronic medical record including appropriate staff access to medical record regarding behavioral health services and consent to treatment were reviewed.    Note Share:     This note was not shared with the patient due to reasonable likelihood of causing patient harm     Sharon Pierre   Psychiatry, PA-S

## 2025-02-07 NOTE — ASSESSMENT & PLAN NOTE
Reviewed 2/7/25  Follows with medical  Continue famotidine 20 mg tablet BID per medical team   Continue pantoprazole EC 20 mg tablet every morning per medical team  This is a chronic condition, and is stable unless otherwise noted.  No associated orders from this encounter found during lookback period of 72 hours.

## 2025-02-07 NOTE — ASSESSMENT & PLAN NOTE
Reviewed  2/7/25  Follows with medical team  No associated orders from this encounter found during lookback period of 72 hours.

## 2025-02-08 PROCEDURE — 99232 SBSQ HOSP IP/OBS MODERATE 35: CPT | Performed by: PHYSICIAN ASSISTANT

## 2025-02-08 RX ADMIN — ESCITALOPRAM OXALATE 20 MG: 10 TABLET, FILM COATED ORAL at 08:45

## 2025-02-08 RX ADMIN — MELATONIN TAB 3 MG 9 MG: 3 TAB at 21:08

## 2025-02-08 RX ADMIN — HYDROCHLOROTHIAZIDE 12.5 MG: 12.5 TABLET ORAL at 08:45

## 2025-02-08 RX ADMIN — AMLODIPINE BESYLATE 5 MG: 5 TABLET ORAL at 08:45

## 2025-02-08 RX ADMIN — DOCUSATE SODIUM 100 MG: 100 CAPSULE, LIQUID FILLED ORAL at 08:45

## 2025-02-08 RX ADMIN — NICOTINE POLACRILEX 2 MG: 2 GUM, CHEWING ORAL at 12:42

## 2025-02-08 RX ADMIN — LORATADINE 10 MG: 10 TABLET ORAL at 08:45

## 2025-02-08 RX ADMIN — NICOTINE POLACRILEX 2 MG: 2 GUM, CHEWING ORAL at 17:15

## 2025-02-08 RX ADMIN — DOCUSATE SODIUM 100 MG: 100 CAPSULE, LIQUID FILLED ORAL at 17:15

## 2025-02-08 RX ADMIN — CLOZAPINE 250 MG: 25 TABLET ORAL at 21:07

## 2025-02-08 RX ADMIN — NICOTINE POLACRILEX 2 MG: 2 GUM, CHEWING ORAL at 21:08

## 2025-02-08 RX ADMIN — HALOPERIDOL 5 MG: 5 TABLET ORAL at 17:15

## 2025-02-08 RX ADMIN — MULTIPLE VITAMINS W/ MINERALS TAB 1 TABLET: TAB ORAL at 08:45

## 2025-02-08 RX ADMIN — PROPRANOLOL HYDROCHLORIDE 10 MG: 10 TABLET ORAL at 21:08

## 2025-02-08 RX ADMIN — HALOPERIDOL 5 MG: 5 TABLET ORAL at 08:45

## 2025-02-08 RX ADMIN — PROPRANOLOL HYDROCHLORIDE 10 MG: 10 TABLET ORAL at 08:45

## 2025-02-08 RX ADMIN — SENNOSIDES AND DOCUSATE SODIUM 2 TABLET: 50; 8.6 TABLET ORAL at 21:07

## 2025-02-08 RX ADMIN — PANTOPRAZOLE SODIUM 20 MG: 20 TABLET, DELAYED RELEASE ORAL at 08:45

## 2025-02-08 NOTE — ASSESSMENT & PLAN NOTE
Reviewed 2/9/25  Follows with medical  Continue famotidine 20 mg tablet BID per medical team   Continue pantoprazole EC 20 mg tablet every morning per medical team  This is a chronic condition, and is stable unless otherwise noted.  No associated orders from this encounter found during lookback period of 72 hours.

## 2025-02-08 NOTE — NURSING NOTE
"Patient has been more withdrawn to his room today. He stated \"I can't stay out of my head. I am under the weather today. I have depression.\" Writer encouraged to be more engaged in the milieu to keep his mind busy. Patient encouraged to use his coping skills , like the gym or reading his bible. He is compliant with medications. He slept through breakfast, ate lunch and refused dinner.   "

## 2025-02-08 NOTE — ASSESSMENT & PLAN NOTE
Reviewed  2/9/25  Follows with medical team  Continue senna-docusate sodium dose per medical team.   Continue with regular bowel movements  No associated orders from this encounter found during lookback period of 72 hours.

## 2025-02-08 NOTE — PROGRESS NOTES
Progress Note - Behavioral Health     Name: Alberto Berumen 29 y.o. male I MRN: [id number]   Unit/Bed#: [facility] 112-02 I Date of Admission: 3/29/2024   Date of Service: 2/9/2025 I Hospital Day: 317           Assessment & Plan  Schizoaffective disorder, bipolar type (HCC)  Reviewed 2/9/25  Pt still endorses AH of voices threatening to harm him and his family.   Per nursing there are no acute behaviors.     Continue medication as follows:  Continue Clozapine 250 mg QHS for psychosis - increased 12/11/24 02/04: , BNP 10, CRP 9.5  02/04: ANC 4.42  To consider further careful titration  CBC w/diff and CK every 2 weeks  Continue haldol to 5 mg BID for psychosis   Continue Lexapro 20 mg daily for depression and anxiety  Continue Propanolol 10 mg BID for anxiety   Continue Melatonin 9 mg QHS for sleep  Continue Colace and Senna-Docusate sodium 50 mg tablet QHS for constipation  Continue Zofran 4 mg tablet Q6H PRN for nausea   Continue PRN mouth kote for xerostomia     - Pt remains on dual antipsychotic Tx due to failure on monotherapy   .  - S/p ECT treatments.  - ACT team referral was made for him living back with his aunt once MA funding comes through from the [facility] and sister will try to reapply  No associated orders from this encounter found during lookback period of 72 hours.    Chronic idiopathic constipation  Reviewed  2/9/25  Follows with medical team  Continue senna-docusate sodium dose per medical team.   Continue with regular bowel movements  No associated orders from this encounter found during lookback period of 72 hours.  GERD (gastroesophageal reflux disease)  Reviewed 2/9/25  Follows with medical  Continue famotidine 20 mg tablet BID per medical team   Continue pantoprazole EC 20 mg tablet every morning per medical team  This is a chronic condition, and is stable unless otherwise noted.  No associated orders from this encounter found during lookback period of 72 hours.  Tobacco  "abuse  Reviewed 2/9/25  On nicotine gum as needed and encouraged smoking cessation  Continue to encourage cessation upon discharge  No associated orders from this encounter found during lookback period of 72 hours.  Elevated hemoglobin A1c  Reviewed  2/9/25  Follows with medical team  No associated orders from this encounter found during lookback period of 72 hours.  Class 2 obesity in adult  Reviewed  2/9/25  Follows with medical team and given dietary counseling and need for exercise  No associated orders from this encounter found during lookback period of 72 hours.    Primary hypertension  Reviewed  2/9/2025  Follows with medical team on hydrochlorothiazide and Norvasc  Blood pressure remains stable at this time.  No associated orders from this encounter found during lookback period of 72 hours.    Alberto Berumen was seen for continuing care and reviewed with treatment team.  Pt insisted on interviewing from his bed, and was sleeping on my approach.  He was scant in conversation and withdrawn.  He reported his depression and anxiety are no worse and due to his ongoing AH of voices that threaten the lives of him and his family.  Paranoia is the same and relates to the AH.  He reports his medications help \"A little bit.\"  He reported eating and sleeping well, though only ate lunch meal yesterday and lunch thus far today per current nursing flowsheet.  Pt presently denies self-injurious thoughts/behaviors, SI, HI, hallucinations or paranoia.  He reported he did have plans to watch the Super Bowl later today.         Per EMR and floor team, the Pt has been calm, cooperative, and medication compliant thus far through the weekend.  Yesterday he was mostly isolative to his room, as well as this morning.  Last night he was visible in the milieu and expressed worry to the nurse, that he will be all right.        Behavior over the last 24 hours: unchanged.   Sleep:Good  Appetite:  Fair  Medication side effects: None per " Pt    ROS: As per HPI, (All ROS Negative)    Labs/Tests: Reviewed    Mental Status Evaluation:  Appearance:  Dressed, minimal eye contact   Behavior:  Calm, wanting to be interviewed from his bed, cooperative at the most basic level   Speech:  Scant, but clear with normal rate and volume, answering questions readily   Mood:  Anxious, Depressed   Affect:  Blunted   Thought Process:  Organized, Goal directed, negative   Associations: Intact associations   Thought Content:  No verbalized delusions   Perceptual Disturbances: Pt denies any hallucinations or paranoia and does not appear to be responding to internal stimuli   Risk Potential: Pt presently denies SI or HI    Sensorium:  Self, birthday, Place, Day of the week, Month, Year   Memory:  short term memory grossly intact   Consciousness:  alert, awake   Attention: Fair   Insight:  Limited   Judgment: Fair   Gait/Station: Normal gait/station   Motor Activity: No abnormal movements     Vitals:    02/07/25 2044 02/08/25 2011 02/09/25 0812 02/09/25 1555   BP:  135/88 139/72 113/56   BP Location:  Right arm Left arm Left arm   Pulse: 91 90 70 76   Resp:   18 18   Temp:   97.5 °F (36.4 °C) 98.2 °F (36.8 °C)   TempSrc:   Temporal Temporal   SpO2:   100% 98%   Weight:   108 kg (238 lb 6.4 oz)    Height:           Progress Toward Goals:  Based on today's interview and review of prior notes, no change through the weekend.    Recommended Treatment: Continue with pharmacotherapy, group therapy, milieu therapy and occupational therapy.  The patient will be maintained on the following medications:  Current Facility-Administered Medications   Medication Dose Route Frequency Provider Last Rate    acetaminophen  650 mg Oral Q4H PRN Jordan C Holter,       acetaminophen  650 mg Oral Q6H PRN HOLLI Lion      aluminum-magnesium hydroxide-simethicone  30 mL Oral Q4H PRN Jordan C Holter,       amLODIPine  5 mg Oral Daily HOLLI Lion      Artificial Tears  1 drop Both  Eyes Q3H PRN Jordan C Holter, DO      haloperidol lactate  2.5 mg Intramuscular Q4H PRN Max 4/day HOLLI Lion      And    LORazepam  1 mg Intramuscular Q4H PRN Max 4/day HOLLI Lion      And    benztropine  0.5 mg Intramuscular Q4H PRN Max 4/day Eveline Hunt CRNP      haloperidol lactate  5 mg Intramuscular Q4H PRN Max 4/day HOLLI Lion      And    LORazepam  2 mg Intramuscular Q4H PRN Max 4/day HOLLI Lion      And    benztropine  1 mg Intramuscular Q4H PRN Max 4/day HOLLI Lion      bisacodyl  10 mg Rectal Daily PRN HOLLI Lion      calcium carbonate  500 mg Oral BID PRN HOLLI Monge      cloZAPine  250 mg Oral HS Bora Rosario MD      hydrOXYzine HCL  50 mg Oral Q6H PRN Max 4/day Jordan C Holter, DO      Or    diphenhydrAMINE  50 mg Intramuscular Q6H PRN Jordan C Holter, DO      hydrOXYzine HCL  50 mg Oral Q6H PRN Max 4/day HOLLI Lion      Or    diphenhydrAMINE  50 mg Intramuscular Q6H PRN HOLLI Lion      diphenhydrAMINE-zinc acetate   Topical BID PRN HOLLI Lion      docusate sodium  100 mg Oral BID Jourdan Dickens DO      escitalopram  20 mg Oral Daily HOLLI Lion      famotidine  20 mg Oral BID PRN HOLLI Monge      fluticasone  1 spray Each Nare Daily PRN HOLLI Monge      haloperidol  1 mg Oral Q6H PRN HOLLI Lion      haloperidol  2.5 mg Oral Q4H PRN Max 4/day HOLLI Lion      haloperidol  5 mg Oral Q4H PRN Max 4/day HOLLI Lion      haloperidol  5 mg Oral BID Bora Rosario MD      hydroCHLOROthiazide  12.5 mg Oral Daily Pia Mantilla, HOLLI      hydrocortisone   Topical 4x Daily PRN HOLLI Lion      hydrOXYzine HCL  100 mg Oral Q6H PRN Max 4/day Jordan C Holter, DO      Or    LORazepam  2 mg Intramuscular Q6H PRN Jordan C Holter, DO      hydrOXYzine HCL  100 mg Oral Q6H PRN Max 4/day HOLLI Lion      Or    LORazepam  2 mg Intramuscular Q6H PRN  HOLLI Lion      hydrOXYzine HCL  25 mg Oral Q6H PRN Max 4/day [facility] Holter, DO      ibuprofen  600 mg Oral Q8H PRN HOLLI Lion      influenza vaccine  0.5 mL Intramuscular Once Bora Rosario MD      loratadine  10 mg Oral Daily Brina Guillen MD      magnesium hydroxide  30 mL Oral Once Jourdan Dickens, DO      melatonin  3 mg Oral HS PRN Bora Rosario MD      melatonin  9 mg Oral HS Bora Rosario MD      methocarbamol  500 mg Oral Q6H PRN HOLLI Lion      multivitamin-minerals  1 tablet Oral Daily Eileen Jensen, HOLLI      nicotine polacrilex  2 mg Oral Q4H PRN Bora Rosario MD      OLANZapine  5 mg Oral Q4H PRN Max 3/day [facility] Holter, DO      Or    OLANZapine  2.5 mg Intramuscular Q4H PRN Max 3/day [facility] Holter, DO      OLANZapine  5 mg Oral Q3H PRN Max 3/day [facility] Holter, DO      Or    OLANZapine  5 mg Intramuscular Q3H PRN Max 3/day [facility] Holter, DO      OLANZapine  2.5 mg Oral Q4H PRN Max 6/day [facility] Holter, DO      ondansetron  4 mg Oral Q6H PRN HOLLI Lion      pantoprazole  20 mg Oral QAM [facility], [facility]      polyethylene glycol  17 g Oral Daily PRN HOLLI Ghotra      polyethylene glycol  17 g Oral Daily Jourdan Dickens, DO      propranolol  10 mg Oral Q12H KAYLIE HOLLI Lion      saliva substitute  5 spray Mouth/Throat 4x Daily PRN Mary Jo Reich PA-C      senna-docusate sodium  1 tablet Oral Daily PRN [facility] Cresencioter, DO      senna-docusate sodium  2 tablet Oral HS Jourdan Dickens, DO      traZODone  50 mg Oral HS PRN HOLLI Lion      white petrolatum-mineral oil   Topical TID PRN HOLLI Lion         Risks, benefits and possible side effects of Medications:   Risks, benefits, and possible side effects of medications explained to patient and patient verbalizes understanding    Coordination of Care:  Total floor / unit time spent today 30 minutes. Greater than 50% of total time was spent with the patient and / or family  counseling and / or coordination of care. A description of counseling / coordination of care:  Patient's progress reviewed with nursing staff.  Importance of medication and treatment compliance reviewed with patient.  Encouraged participation in milieu and group therapy on the unit.

## 2025-02-08 NOTE — PLAN OF CARE
Problem: Alteration in Thoughts and Perception  Goal: Treatment Goal: Gain control of psychotic behaviors/thinking, reduce/eliminate presenting symptoms and demonstrate improved reality functioning upon discharge  Outcome: Progressing  Goal: Agree to be compliant with medication regime, as prescribed and report medication side effects  Description: Interventions:  - Offer appropriate PRN medication and supervise ingestion; conduct AIMS, as needed   Outcome: Progressing     Problem: Ineffective Coping  Goal: Participates in unit activities  Description: Interventions:  - Provide therapeutic environment   - Provide required programming   - Redirect inappropriate behaviors   Outcome: Progressing  Goal: Patient/Family participate in treatment and DC plans  Description: Interventions:  - Provide therapeutic environment  Outcome: Progressing  Goal: Patient/Family verbalizes awareness of resources  Outcome: Progressing     Problem: Depression  Goal: Treatment Goal: Demonstrate behavioral control of depressive symptoms, verbalize feelings of improved mood/affect, and adopt new coping skills prior to discharge  Outcome: Progressing  Goal: Verbalize thoughts and feelings  Description: Interventions:  - Assess and re-assess patient's level of risk   - Engage patient in 1:1 interactions, daily, for a minimum of 15 minutes   - Encourage patient to express feelings, fears, frustrations, hopes   Outcome: Progressing  Goal: Refrain from harming self  Description: Interventions:  - Monitor patient closely, per order   - Supervise medication ingestion, monitor effects and side effects   Outcome: Progressing  Goal: Refrain from isolation  Description: Interventions:  - Develop a trusting relationship   - Encourage socialization   Outcome: Progressing  Goal: Refrain from self-neglect  Outcome: Progressing     Problem: Anxiety  Goal: Anxiety is at manageable level  Description: Interventions:  - Assess and monitor patient's anxiety  level.   - Monitor for signs and symptoms (heart palpitations, chest pain, shortness of breath, headaches, nausea, feeling jumpy, restlessness, irritable, apprehensive).   - Collaborate with interdisciplinary team and initiate plan and interventions as ordered.  - [place] patient to unit/surroundings  - Explain treatment plan  - Encourage participation in care  - Encourage verbalization of concerns/fears  - Identify coping mechanisms  - Assist in developing anxiety-reducing skills  - Administer/offer alternative therapies  - Limit or eliminate stimulants  Outcome: Progressing     Problem: Alteration in Orientation  Goal: Treatment Goal: Demonstrate a reduction of confusion and improved orientation to person, place, time and/or situation upon discharge, according to optimum baseline/ability  Outcome: Progressing  Goal: Interact with staff daily  Description: Interventions:  - Assess and re-assess patient's level of orientation  - Engage patient in 1 on 1 interactions, daily, for a minimum of 15 minutes   - Establish rapport/trust with patient   Outcome: Progressing  Goal: Allow medical examinations, as recommended  Description: Interventions:  - Provide physical/neurological exams and/or referrals, per provider   Outcome: Progressing  Goal: Complete daily ADLs, including personal hygiene independently, as able  Description: Interventions:  - Observe, teach, and assist patient with ADLS  Outcome: Progressing     Problem: DISCHARGE PLANNING  Goal: Discharge to home with appropriate resources  Description: INTERVENTIONS:  - Identify barriers to discharge w/patient and caregiver  - Arrange for needed discharge resources and transportation as appropriate  - Identify discharge learning needs (meds, wound care, etc.)  - Refer to Case Management Department for coordinating discharge planning if the patient needs post-hospital services based on physician/advanced practitioner order or complex needs related to functional  status, cognitive ability, or social support system  Outcome: Progressing

## 2025-02-08 NOTE — ASSESSMENT & PLAN NOTE
Reviewed 2/9/25  Pt still endorses AH of voices threatening to harm him and his family.   Per nursing there are no acute behaviors.     Continue medication as follows:  Continue Clozapine 250 mg QHS for psychosis - increased 12/11/24 02/04: , BNP 10, CRP 9.5  02/04: ANC 4.42  To consider further careful titration  CBC w/diff and CK every 2 weeks  Continue haldol to 5 mg BID for psychosis   Continue Lexapro 20 mg daily for depression and anxiety  Continue Propanolol 10 mg BID for anxiety   Continue Melatonin 9 mg QHS for sleep  Continue Colace and Senna-Docusate sodium 50 mg tablet QHS for constipation  Continue Zofran 4 mg tablet Q6H PRN for nausea   Continue PRN mouth kote for xerostomia     - Pt remains on dual antipsychotic Tx due to failure on monotherapy   .  - S/p ECT treatments.  - ACT team referral was made for him living back with his aunt once MA funding comes through from the [facility] and sister will try to reapply  No associated orders from this encounter found during lookback period of 72 hours.

## 2025-02-08 NOTE — ASSESSMENT & PLAN NOTE
Reviewed  2/8/25  Follows with medical team  Continue senna-docusate sodium dose per medical team.   Continue with regular bowel movements  No associated orders from this encounter found during lookback period of 72 hours.

## 2025-02-08 NOTE — ASSESSMENT & PLAN NOTE
Reviewed  2/8/25  Follows with medical team on hydrochlorothiazide and Norvasc  Blood pressure remains stable at this time.  No associated orders from this encounter found during lookback period of 72 hours.

## 2025-02-08 NOTE — ASSESSMENT & PLAN NOTE
Reviewed 2/8/25  Pt still endorses AH of voices threatening to harm him and his family.   Per nursing there are no acute behaviors.     Continue medication as follows:  Continue Clozapine 250 mg QHS for psychosis - increased 12/11/24 02/04: , BNP 10, CRP 9.5  02/04: ANC 4.42  To consider further careful titration  CBC w/diff and CK every 2 weeks  Continue haldol to 5 mg BID for psychosis   Continue Lexapro 20 mg daily for depression and anxiety  Continue Propanolol 10 mg BID for anxiety   Continue Melatonin 9 mg QHS for sleep  Continue Colace and Senna-Docusate sodium 50 mg tablet QHS for constipation  Continue Zofran 4 mg tablet Q6H PRN for nausea   Continue PRN mouth kote for xerostomia     - Pt remains on dual antipsychotic Tx due to failure on monotherapy   .  - S/p ECT treatments.  - ACT team referral was made for him living back with his aunt once MA funding comes through from the [facility] and sister will try to reapply  No associated orders from this encounter found during lookback period of 72 hours.

## 2025-02-08 NOTE — PROGRESS NOTES
Progress Note - Behavioral Health     Name: Alberto Berumen 29 y.o. male I MRN: [id number]   Unit/Bed#: [facility] 112-02 I Date of Admission: 3/29/2024   Date of Service: 2/8/2025 I Hospital Day: 316           Assessment & Plan  Schizoaffective disorder, bipolar type (HCC)  Reviewed 2/8/25  Pt still endorses AH of voices threatening to harm him and his family.   Per nursing there are no acute behaviors.     Continue medication as follows:  Continue Clozapine 250 mg QHS for psychosis - increased 12/11/24 02/04: , BNP 10, CRP 9.5  02/04: ANC 4.42  To consider further careful titration  CBC w/diff and CK every 2 weeks  Continue haldol to 5 mg BID for psychosis   Continue Lexapro 20 mg daily for depression and anxiety  Continue Propanolol 10 mg BID for anxiety   Continue Melatonin 9 mg QHS for sleep  Continue Colace and Senna-Docusate sodium 50 mg tablet QHS for constipation  Continue Zofran 4 mg tablet Q6H PRN for nausea   Continue PRN mouth kote for xerostomia     - Pt remains on dual antipsychotic Tx due to failure on monotherapy   .  - S/p ECT treatments.  - ACT team referral was made for him living back with his aunt once MA funding comes through from the [facility] and sister will try to reapply  No associated orders from this encounter found during lookback period of 72 hours.    Chronic idiopathic constipation  Reviewed  2/8/25  Follows with medical team  Continue senna-docusate sodium dose per medical team.   Continue with regular bowel movements  No associated orders from this encounter found during lookback period of 72 hours.  GERD (gastroesophageal reflux disease)  Reviewed 2/8/25  Follows with medical  Continue famotidine 20 mg tablet BID per medical team   Continue pantoprazole EC 20 mg tablet every morning per medical team  This is a chronic condition, and is stable unless otherwise noted.  No associated orders from this encounter found during lookback period of 72 hours.  Tobacco  "abuse  Reviewed 2/8/25  On nicotine gum as needed and encouraged smoking cessation  Continue to encourage cessation upon discharge  No associated orders from this encounter found during lookback period of 72 hours.  Elevated hemoglobin A1c  Reviewed  2/8/25  Follows with medical team  No associated orders from this encounter found during lookback period of 72 hours.  Class 2 obesity in adult  Reviewed  2/8/25  Follows with medical team and given dietary counseling and need for exercise  No associated orders from this encounter found during lookback period of 72 hours.    Primary hypertension  Reviewed  2/8/25  Follows with medical team on hydrochlorothiazide and Norvasc  Blood pressure remains stable at this time.  No associated orders from this encounter found during lookback period of 72 hours.    Alberto Berumen was seen for continuing care and reviewed with treatment team.  Pt was in bed on approach, needed coaxing to get up for interview but eventually complied.   He was scant, and somewhat superficially reported \"I'm all good\" at first.  He then stated he was depressed and anxious due to the ongoing AH consisting of the same voices threatening to kill him and his family.  He denies any change I nthe quality or frequency of the AH, but does feel paranoid about them.  He does not feel his present medications are helping this enough.  He reports sleeping and eating well to which nursing concurs.  He tends to skip breakfast in the mornings.       Per EMR and floor team, the Pt has been calm, medication compliant, and visible  in the milieu but selectively social.  He has been attending many therapeutic groups with appropriate behavior among peers.      Behavior over the last 24 hours: unchanged.   Sleep:Good  Appetite: Good   Medication side effects: As per HPI    ROS: As per HPI, (All else negative)    Labs/Tests: Reviewed    Mental Status Evaluation:  Appearance:  Dressed, clean, Fair eye contact   Behavior:  " Calm, at first a bit resistant to interview, was  in bed, but then agreed and got up to speak with me in the conference room privately. Cooperative at the most basic level.   Speech:  Clear, normal rate and volume, scant, but answered questions readily   Mood:  Anxious, Depressed   Affect:  Blunted   Thought Process:  Organized, Goal directed, negative   Associations: Intact associations   Thought Content:  Paranoia   Perceptual Disturbances: +AH and paranoia that the voices can bring to fruition what they threaten to do.  No VH, TH.  Pt appears paranoid   Risk Potential: Pt presently denies SI or HI    Sensorium:  Self, birthday, Place, Day of the week, Month, Year   Memory:  short term memory grossly intact   Consciousness:  alert, awake   Attention: Fair   Insight:  Limited   Judgment: Fair   Gait/Station: Normal gait/station   Motor Activity: No abnormal movements     Vitals:    02/06/25 2007 02/07/25 0747 02/07/25 1526 02/07/25 2044   BP: 140/85 126/67 109/55 129/70   BP Location: Right arm Left arm Right arm Right arm   Pulse: 80 77 70 91   Resp:  17 18    Temp:  97.8 °F (36.6 °C) 97.7 °F (36.5 °C)    TempSrc:  Temporal Temporal    SpO2:  96% 96%    Weight:       Height:           Labwork: I have personally reviewed all pertinent laboratory/tests results.  Clozapine:   Lab Results   Component Value Date    CLOZAPINE 288 (L) 11/19/2024     EKG   Lab Results   Component Value Date    VENTRATE 78 01/27/2025    ATRIALRATE 78 01/27/2025    PRINT 150 01/27/2025    QRSDINT 94 01/27/2025    QTINT 374 01/27/2025    QTCINT 426 01/27/2025    PAXIS 45 01/27/2025    QRSAXIS 43 01/27/2025    TWAVEAXIS -19 01/27/2025     Imaging Studies: No results found.  Recent Results (from the past 160 hours)   CBC and differential    Collection Time: 02/04/25  8:04 AM   Result Value Ref Range    WBC 9.74 4.31 - 10.16 Thousand/uL    RBC 5.54 3.88 - 5.62 Million/uL    Hemoglobin 12.9 12.0 - 17.0 g/dL    Hematocrit 43.5 36.5 - 49.3 %     MCV 79 (L) 82 - 98 fL    MCH 23.3 (L) 26.8 - 34.3 pg    MCHC 29.7 (L) 31.4 - 37.4 g/dL    RDW 14.1 11.6 - 15.1 %    MPV 9.8 8.9 - 12.7 fL    Platelets 246 149 - 390 Thousands/uL    nRBC 0 /100 WBCs    Segmented % 45 43 - 75 %    Immature Grans % 0 0 - 2 %    Lymphocytes % 41 14 - 44 %    Monocytes % 9 4 - 12 %    Eosinophils Relative 4 0 - 6 %    Basophils Relative 1 0 - 1 %    Absolute Neutrophils 4.42 1.85 - 7.62 Thousands/µL    Absolute Immature Grans 0.03 0.00 - 0.20 Thousand/uL    Absolute Lymphocytes 4.00 0.60 - 4.47 Thousands/µL    Absolute Monocytes 0.83 0.17 - 1.22 Thousand/µL    Eosinophils Absolute 0.39 0.00 - 0.61 Thousand/µL    Basophils Absolute 0.07 0.00 - 0.10 Thousands/µL   CK    Collection Time: 02/04/25  8:04 AM   Result Value Ref Range    Total  39 - 308 U/L   C-reactive protein    Collection Time: 02/04/25  8:04 AM   Result Value Ref Range    CRP 9.5 (H) <3.0 mg/L   B-Type Natriuretic Peptide(BNP)    Collection Time: 02/04/25  8:04 AM   Result Value Ref Range    BNP 10 0 - 100 pg/mL        Progress Toward Goals:  Based on today's interview and review of prior notes, insufficient improvement    Recommended Treatment: Continue with pharmacotherapy, group therapy, milieu therapy and occupational therapy.  The patient will be maintained on the following medications:  Current Facility-Administered Medications   Medication Dose Route Frequency Provider Last Rate    acetaminophen  650 mg Oral Q4H PRN Jordan C Holter,       acetaminophen  650 mg Oral Q6H PRN HOLLI Lion      aluminum-magnesium hydroxide-simethicone  30 mL Oral Q4H PRN Jordan C Holter,       amLODIPine  5 mg Oral Daily HOLLI Lion      Artificial Tears  1 drop Both Eyes Q3H PRN Jordan C Holter,       haloperidol lactate  2.5 mg Intramuscular Q4H PRN Max 4/day HOLLI Lion      And    LORazepam  1 mg Intramuscular Q4H PRN Max 4/day HOLLI Lion      And    benztropine  0.5 mg Intramuscular Q4H PRN Max  4/day HOLLI Lion      haloperidol lactate  5 mg Intramuscular Q4H PRN Max 4/day HOLLI Lion      And    LORazepam  2 mg Intramuscular Q4H PRN Max 4/day HOLLI Lion      And    benztropine  1 mg Intramuscular Q4H PRN Max 4/day HOLLI Lion      bisacodyl  10 mg Rectal Daily PRN HOLLI Lion      calcium carbonate  500 mg Oral BID PRN HOLLI Monge      cloZAPine  250 mg Oral HS Bora Rosario MD      hydrOXYzine HCL  50 mg Oral Q6H PRN Max 4/day Jordan C Holter, DO      Or    diphenhydrAMINE  50 mg Intramuscular Q6H PRN Jordan C Holter, DO      hydrOXYzine HCL  50 mg Oral Q6H PRN Max 4/day HOLLI Lion      Or    diphenhydrAMINE  50 mg Intramuscular Q6H PRN HOLLI Lion      diphenhydrAMINE-zinc acetate   Topical BID PRN HOLLI Lion      docusate sodium  100 mg Oral BID Jourdan Dickens,       escitalopram  20 mg Oral Daily HOLLI Lion      famotidine  20 mg Oral BID PRN HOLLI Monge      fluticasone  1 spray Each Nare Daily PRN HOLLI Monge      haloperidol  1 mg Oral Q6H PRN HOLLI Lion      haloperidol  2.5 mg Oral Q4H PRN Max 4/day HOLLI Lion      haloperidol  5 mg Oral Q4H PRN Max 4/day HOLLI Loin      haloperidol  5 mg Oral BID Bora Rosario MD      hydroCHLOROthiazide  12.5 mg Oral Daily Pia Mantilla, HOLLI      hydrocortisone   Topical 4x Daily PRN HOLLI Lion      hydrOXYzine HCL  100 mg Oral Q6H PRN Max 4/day Ion Dupontter, DO      Or    LORazepam  2 mg Intramuscular Q6H PRN Ion Dupontter, DO      hydrOXYzine HCL  100 mg Oral Q6H PRN Max 4/day HOLLI Lion      Or    LORazepam  2 mg Intramuscular Q6H PRN HOLLI Lion      hydrOXYzine HCL  25 mg Oral Q6H PRN Max 4/day Ion FISCHER Holter, DO      ibuprofen  600 mg Oral Q8H PRN HOLLI Lion      influenza vaccine  0.5 mL Intramuscular Once Bora Rosario MD      loratadine  10 mg Oral Daily Brina  MD Wilmer      magnesium hydroxide  30 mL Oral Once Jourdan Dickens, DO      melatonin  3 mg Oral HS PRN Bora Rosario MD      melatonin  9 mg Oral HS Bora Rosario MD      methocarbamol  500 mg Oral Q6H PRN HOLLI Lion      multivitamin-minerals  1 tablet Oral Daily Eileen Gonzaleznathan, HOLLI      nicotine polacrilex  2 mg Oral Q4H PRN Bora Rosario MD      OLANZapine  5 mg Oral Q4H PRN Max 3/day [facility] Holter, DO      Or    OLANZapine  2.5 mg Intramuscular Q4H PRN Max 3/day [facility] Holter, DO      OLANZapine  5 mg Oral Q3H PRN Max 3/day [facility] Holter, DO      Or    OLANZapine  5 mg Intramuscular Q3H PRN Max 3/day [facility] Holter, DO      OLANZapine  2.5 mg Oral Q4H PRN Max 6/day [facility] Holter, DO      ondansetron  4 mg Oral Q6H PRN HOLLI Lion      [facility]raz[facility]  [facility]      polyethylene glycol  17 g Oral Daily PRN HOLLI Ghotra      polyethylene glycol  17 g Oral Daily Jourdan Dickens, DO      propranolol  10 mg Oral Q12H KAYLIE HOLLI Lion      saliva substitute  5 spray Mouth/Throat 4x Daily PRN Mary Jo Reich PA-C      senna-docusate sodium  1 tablet Oral Daily PRN [facility] Holter, DO      senna-docusate sodium  2 tablet Oral HS Jourdan Dickens, DO      traZODone  50 mg Oral HS PRN HOLLI Lion      white petrolatum-mineral oil   Topical TID PRN HOLLI Lion         Risks, benefits and possible side effects of Medications:   Risks, benefits, and possible side effects of medications explained to patient and patient verbalizes understanding    Coordination of Care:  Total floor / unit time spent today 30 minutes. Greater than 50% of total time was spent with the patient and / or family counseling and / or coordination of care. A description of counseling / coordination of care:  Patient's progress reviewed with nursing staff.  Importance of medication and treatment compliance reviewed with patient.  Encouraged participation in milieu  and group therapy on the unit.

## 2025-02-08 NOTE — ASSESSMENT & PLAN NOTE
Reviewed 2/8/25  Follows with medical  Continue famotidine 20 mg tablet BID per medical team   Continue pantoprazole EC 20 mg tablet every morning per medical team  This is a chronic condition, and is stable unless otherwise noted.  No associated orders from this encounter found during lookback period of 72 hours.

## 2025-02-08 NOTE — ASSESSMENT & PLAN NOTE
Reviewed  2/9/25  Follows with medical team and given dietary counseling and need for exercise  No associated orders from this encounter found during lookback period of 72 hours.

## 2025-02-08 NOTE — ASSESSMENT & PLAN NOTE
Reviewed 2/8/25  On nicotine gum as needed and encouraged smoking cessation  Continue to encourage cessation upon discharge  No associated orders from this encounter found during lookback period of 72 hours.

## 2025-02-08 NOTE — ASSESSMENT & PLAN NOTE
Reviewed  2/9/2025  Follows with medical team on hydrochlorothiazide and Norvasc  Blood pressure remains stable at this time.  No associated orders from this encounter found during lookback period of 72 hours.

## 2025-02-08 NOTE — ASSESSMENT & PLAN NOTE
Reviewed  2/9/25  Follows with medical team  No associated orders from this encounter found during lookback period of 72 hours.

## 2025-02-08 NOTE — ASSESSMENT & PLAN NOTE
Reviewed  2/8/25  Follows with medical team  No associated orders from this encounter found during lookback period of 72 hours.

## 2025-02-08 NOTE — ASSESSMENT & PLAN NOTE
Reviewed  2/8/25  Follows with medical team and given dietary counseling and need for exercise  No associated orders from this encounter found during lookback period of 72 hours.

## 2025-02-08 NOTE — ASSESSMENT & PLAN NOTE
Reviewed 2/9/25  On nicotine gum as needed and encouraged smoking cessation  Continue to encourage cessation upon discharge  No associated orders from this encounter found during lookback period of 72 hours.

## 2025-02-09 PROCEDURE — 99232 SBSQ HOSP IP/OBS MODERATE 35: CPT | Performed by: PHYSICIAN ASSISTANT

## 2025-02-09 RX ADMIN — DOCUSATE SODIUM 100 MG: 100 CAPSULE, LIQUID FILLED ORAL at 17:15

## 2025-02-09 RX ADMIN — HALOPERIDOL 5 MG: 5 TABLET ORAL at 17:14

## 2025-02-09 RX ADMIN — HYDROCHLOROTHIAZIDE 12.5 MG: 12.5 TABLET ORAL at 08:55

## 2025-02-09 RX ADMIN — AMLODIPINE BESYLATE 5 MG: 5 TABLET ORAL at 08:55

## 2025-02-09 RX ADMIN — CLOZAPINE 250 MG: 25 TABLET ORAL at 21:27

## 2025-02-09 RX ADMIN — PANTOPRAZOLE SODIUM 20 MG: 20 TABLET, DELAYED RELEASE ORAL at 08:56

## 2025-02-09 RX ADMIN — MULTIPLE VITAMINS W/ MINERALS TAB 1 TABLET: TAB ORAL at 08:56

## 2025-02-09 RX ADMIN — NICOTINE POLACRILEX 2 MG: 2 GUM, CHEWING ORAL at 12:39

## 2025-02-09 RX ADMIN — HALOPERIDOL 5 MG: 5 TABLET ORAL at 08:56

## 2025-02-09 RX ADMIN — PROPRANOLOL HYDROCHLORIDE 10 MG: 10 TABLET ORAL at 08:56

## 2025-02-09 RX ADMIN — NICOTINE POLACRILEX 2 MG: 2 GUM, CHEWING ORAL at 21:27

## 2025-02-09 RX ADMIN — LORATADINE 10 MG: 10 TABLET ORAL at 08:56

## 2025-02-09 RX ADMIN — DOCUSATE SODIUM 100 MG: 100 CAPSULE, LIQUID FILLED ORAL at 08:56

## 2025-02-09 RX ADMIN — MELATONIN TAB 3 MG 9 MG: 3 TAB at 21:27

## 2025-02-09 RX ADMIN — PROPRANOLOL HYDROCHLORIDE 10 MG: 10 TABLET ORAL at 21:27

## 2025-02-09 RX ADMIN — NICOTINE POLACRILEX 2 MG: 2 GUM, CHEWING ORAL at 17:14

## 2025-02-09 RX ADMIN — ESCITALOPRAM OXALATE 20 MG: 10 TABLET, FILM COATED ORAL at 08:55

## 2025-02-09 RX ADMIN — SENNOSIDES AND DOCUSATE SODIUM 2 TABLET: 50; 8.6 TABLET ORAL at 21:27

## 2025-02-09 NOTE — PLAN OF CARE
Problem: Alteration in Thoughts and Perception  Goal: Treatment Goal: Gain control of psychotic behaviors/thinking, reduce/eliminate presenting symptoms and demonstrate improved reality functioning upon discharge  Outcome: Progressing  Goal: Refrain from acting on delusional thinking/internal stimuli  Description: Interventions:  - Monitor patient closely, per order   - Utilize least restrictive measures   - Set reasonable limits, give positive feedback for acceptable   - Administer medications as ordered and monitor of potential side effects  Outcome: Progressing  Goal: Agree to be compliant with medication regime, as prescribed and report medication side effects  Description: Interventions:  - Offer appropriate PRN medication and supervise ingestion; conduct AIMS, as needed   Outcome: Progressing     Problem: Depression  Goal: Refrain from harming self  Description: Interventions:  - Monitor patient closely, per order   - Supervise medication ingestion, monitor effects and side effects   Outcome: Progressing  Goal: Refrain from self-neglect  Outcome: Progressing     Problem: Anxiety  Goal: Anxiety is at manageable level  Description: Interventions:  - Assess and monitor patient's anxiety level.   - Monitor for signs and symptoms (heart palpitations, chest pain, shortness of breath, headaches, nausea, feeling jumpy, restlessness, irritable, apprehensive).   - Collaborate with interdisciplinary team and initiate plan and interventions as ordered.  - [place] patient to unit/surroundings  - Explain treatment plan  - Encourage participation in care  - Encourage verbalization of concerns/fears  - Identify coping mechanisms  - Assist in developing anxiety-reducing skills  - Administer/offer alternative therapies  - Limit or eliminate stimulants  Outcome: Progressing     Problem: Alteration in Orientation  Goal: Treatment Goal: Demonstrate a reduction of confusion and improved orientation to person, place, time and/or  situation upon discharge, according to optimum baseline/ability  Outcome: Progressing  Goal: Interact with staff daily  Description: Interventions:  - Assess and re-assess patient's level of orientation  - Engage patient in 1 on 1 interactions, daily, for a minimum of 15 minutes   - Establish rapport/trust with patient   Outcome: Progressing     Problem: Depression  Goal: Refrain from isolation  Description: Interventions:  - Develop a trusting relationship   - Encourage socialization   Outcome: Not Progressing

## 2025-02-09 NOTE — NURSING NOTE
Maintained on ongoing SAFE precaution without incident on this shift. Awake, alert,pleasant and cooperative.  He is out in the milieu , however, he is somewhat withdrawn.  Continues to struggle with the same auditory hallucinations.  He is more worried about himself, if he going to be ok.  Remain him to stay focus the the achievements he had  made thus far, everyday is a new day and not to stay stagger on things we can not change.   We talk about God's love and forgiveness.  To start to  the Bible again for coping skills. Compliant with medication regimen.   Interacts well with his peers.  Behavior control.

## 2025-02-09 NOTE — NURSING NOTE
Patient completed weekly assessment. He is quiet and withdrawn. He remained most of the day in bed. He was more visible after dinner. He stated he the voices are not better. He is compliant with medications however refused miralax. He ate 100 of lunch and dinner.

## 2025-02-10 PROCEDURE — 99232 SBSQ HOSP IP/OBS MODERATE 35: CPT | Performed by: PSYCHIATRY & NEUROLOGY

## 2025-02-10 RX ADMIN — HALOPERIDOL 5 MG: 5 TABLET ORAL at 17:02

## 2025-02-10 RX ADMIN — ESCITALOPRAM OXALATE 20 MG: 10 TABLET, FILM COATED ORAL at 08:26

## 2025-02-10 RX ADMIN — PANTOPRAZOLE SODIUM 20 MG: 20 TABLET, DELAYED RELEASE ORAL at 08:27

## 2025-02-10 RX ADMIN — LORATADINE 10 MG: 10 TABLET ORAL at 08:26

## 2025-02-10 RX ADMIN — CLOZAPINE 250 MG: 25 TABLET ORAL at 21:10

## 2025-02-10 RX ADMIN — NICOTINE POLACRILEX 2 MG: 2 GUM, CHEWING ORAL at 08:20

## 2025-02-10 RX ADMIN — NICOTINE POLACRILEX 2 MG: 2 GUM, CHEWING ORAL at 17:02

## 2025-02-10 RX ADMIN — DOCUSATE SODIUM 100 MG: 100 CAPSULE, LIQUID FILLED ORAL at 17:02

## 2025-02-10 RX ADMIN — DOCUSATE SODIUM 100 MG: 100 CAPSULE, LIQUID FILLED ORAL at 08:25

## 2025-02-10 RX ADMIN — MULTIPLE VITAMINS W/ MINERALS TAB 1 TABLET: TAB ORAL at 08:25

## 2025-02-10 RX ADMIN — SENNOSIDES AND DOCUSATE SODIUM 2 TABLET: 50; 8.6 TABLET ORAL at 21:10

## 2025-02-10 RX ADMIN — HALOPERIDOL 5 MG: 5 TABLET ORAL at 08:27

## 2025-02-10 RX ADMIN — NICOTINE POLACRILEX 2 MG: 2 GUM, CHEWING ORAL at 12:49

## 2025-02-10 RX ADMIN — PROPRANOLOL HYDROCHLORIDE 10 MG: 10 TABLET ORAL at 21:10

## 2025-02-10 RX ADMIN — MELATONIN TAB 3 MG 9 MG: 3 TAB at 21:10

## 2025-02-10 RX ADMIN — NICOTINE POLACRILEX 2 MG: 2 GUM, CHEWING ORAL at 21:10

## 2025-02-10 NOTE — NURSING NOTE
"Pt remains reporting anxiety,depression and AH are \"very high\". Utilizes coping skills and declines offer for prn. Pt has flat affect, appears depressed, with drawn and quiet. Pt isolative to room most of the morning, more visible after lunch, but keeping to self reading the bible. Pt paces at times and watches tv. No new concerns otherwise.   "

## 2025-02-10 NOTE — ASSESSMENT & PLAN NOTE
Reviewed  2/10/25  Follows with medical team  Continue senna-docusate sodium dose per medical team.   Continue with regular bowel movements, refused miralax this AM.  No associated orders from this encounter found during lookback period of 72 hours.

## 2025-02-10 NOTE — PROGRESS NOTES
"Progress Note - Behavioral Health   Name: Alberto Berumen 29 y.o. male I MRN: [id number]  Unit/Bed#: [facility] 112-02 I Date of Admission: 3/29/2024   Date of Service: 2/10/2025 I Hospital Day: 318     Assessment & Plan  Schizoaffective disorder, bipolar type (HCC)  Reviewed 2/10/25  Patient continues to endorse AH that threaten they are going to hurt him/family which has been ongoing. Patient reported to nursing this morning that his AH, depression, and anxiety have been \"really heavy.\" He continues to report increased fatigue and remains self isolative in his room.       Continue medication as follows:  Continue Clozapine 250 mg QHS for psychosis - increased 12/11/24 02/04: , BNP 10, CRP 9.5  02/04: ANC 4.42  To consider further careful titration  CBC w/diff and CK every 2 weeks  Continue haldol to 5 mg BID for psychosis   Continue Lexapro 20 mg daily for depression and anxiety  Continue Propanolol 10 mg BID for anxiety   Continue Melatonin 9 mg QHS for sleep  Continue Colace and Senna-Docusate sodium 50 mg tablet QHS for constipation  Continue Zofran 4 mg tablet Q6H PRN for nausea   Continue PRN mouth kote for xerostomia     - Pt remains on dual antipsychotic Tx due to failure on monotherapy   .  - S/p ECT treatments.  - ACT team referral was made for him living back with his aunt once MA funding comes through from the [facility] and sister will try to reapply  No associated orders from this encounter found during lookback period of 72 hours.    Chronic idiopathic constipation  Reviewed  2/10/25  Follows with medical team  Continue senna-docusate sodium dose per medical team.   Continue with regular bowel movements, refused miralax this AM.  No associated orders from this encounter found during lookback period of 72 hours.  GERD (gastroesophageal reflux disease)  Reviewed 2/10/25  Follows with medical  Continue famotidine 20 mg tablet BID per medical team   Continue pantoprazole EC 20 mg tablet every " "morning per medical team  This is a chronic condition, and is stable unless otherwise noted.  No associated orders from this encounter found during lookback period of 72 hours.  Tobacco abuse  Reviewed 2/10/25  On nicotine gum as needed and encouraged smoking cessation  Continue to encourage cessation upon discharge  No associated orders from this encounter found during lookback period of 72 hours.  Elevated hemoglobin A1c  Reviewed  2/10/25  Follows with medical team  No associated orders from this encounter found during lookback period of 72 hours.  Class 2 obesity in adult  Reviewed  2/10/25  Follows with medical team and given dietary counseling and need for exercise  No associated orders from this encounter found during lookback period of 72 hours.    Primary hypertension  Reviewed  2/10/25  Follows with medical team on hydrochlorothiazide and Norvasc  Blood pressure remains stable at this time.  No associated orders from this encounter found during lookback period of 72 hours.    Plan:  Continue prescribed psychotropic medication regimen  Currently on 201 commitment status  Continue to promote patient participation in therapeutic milieu.  Continue medical management per medicine.  Continue behavioral health checks q.15 minutes.   Continue vitals per behavioral health unit protocol.  Safety: Safety and communication plan established to target dynamic risk factors discussed above.   Discharge and disposition: Awaiting coordination with ACT services with plans to discharge to patient's aunt's home.    SUBJECTIVE     Patient evaluated this a.m. for continuity of care. Today, Alberto feels like his AH, depression, and anxiety have increased this morning which he also reported to nursing. He continues to endorse AH which he states threaten to \"kill\" him/his family. Otherwise denies any change in the nature of these hallucinations, command hallucinations, or visual hallucinations.Reports feeling his medications have " little effect and continues to endorse increased fatigue.Denies suicidal/homicidal ideation or any self-harming behaviors.     Alberto does not express any acute concerns/issues this morning. Refused his miralax this morning but otherwise medication compliant. He eats 2/3 meals, bathes appropriately, and denies difficulty with bowel movements. He remains in contact with his sister and spoke to her yesterday as well as his mother and aunt.     PSYCHIATRIC REVIEW OF SYSTEMS     Behavior over the last 24 hours: unchanged  Sleep: hypersomnia  Appetite: normal  Medication side effects: No    Progress Toward Goals: Limited, as evidenced by their participation (or lack thereof) in individual, social and therapeutic milieu in addition to adherence to their medication regimen.  Patient Acceptance: Fair  Patient Understanding: Fair  Patient Involvement in Reintegration Process: Remains in contact with sister, mother, and aunt.   Patient's Therapeutic Relationship with Psychiatrist: Trusting  Patient's Optimism Regarding Recovery: Planning discharge to aunt's home following coordination with ACT services.  Group Attendance: 4/10    REVIEW OF SYSTEMS   Review of systems: no complaints    OBJECTIVE     Vital Signs in Past 24 Hours:  Temp:  [97.6 °F (36.4 °C)-98.2 °F (36.8 °C)] 97.6 °F (36.4 °C)  HR:  [76-95] 78  BP: (106-121)/(56-57) 106/57  Resp:  [18] 18  SpO2:  [98 %] 98 %  O2 Device: None (Room air)    Intake/Output in Past 24 hours:  No intake/output data recorded.  No intake/output data recorded.        Laboratory Results:  I have personally reviewed all pertinent laboratory/tests results.  Most Recent Labs:   Lab Results   Component Value Date    WBC 9.74 02/04/2025    RBC 5.54 02/04/2025    HGB 12.9 02/04/2025    HCT 43.5 02/04/2025     02/04/2025    RDW 14.1 02/04/2025    NEUTROABS 4.42 02/04/2025    SODIUM 140 10/16/2024    K 3.8 10/16/2024     10/16/2024    CO2 29 10/16/2024    BUN 9 10/16/2024     CREATININE 0.91 10/16/2024    GLUC 94 10/16/2024    GLUF 94 10/16/2024    CALCIUM 9.5 10/16/2024    AST 26 09/30/2024    ALT 42 09/30/2024    ALKPHOS 64 09/30/2024    TP 6.6 09/30/2024    ALB 3.8 09/30/2024    TBILI 0.47 09/30/2024    CHOLESTEROL 156 03/14/2024    HDL 44 03/14/2024    TRIG 133 03/14/2024    LDLCALC 85 03/14/2024    NONHDLC 112 03/14/2024    LITHIUM 0.61 01/09/2024    [id number] 1.062 11/15/2023    HGBA1C 5.0 04/01/2024    EAG 97 04/01/2024       Behavioral Health Medications: all current active meds have been reviewed.    Current Facility-Administered Medications   Medication Dose Route Frequency Provider Last Rate    acetaminophen  650 mg Oral Q4H PRN Jordan C Holter, DO      acetaminophen  650 mg Oral Q6H PRN HOLLI Lion      aluminum-magnesium hydroxide-simethicone  30 mL Oral Q4H PRN Jordan C Holter, DO      amLODIPine  5 mg Oral Daily HOLLI Lion      Artificial Tears  1 drop Both Eyes Q3H PRN Jordan C Holter, DO      haloperidol lactate  2.5 mg Intramuscular Q4H PRN Max 4/day HOLLI Lion      And    LORazepam  1 mg Intramuscular Q4H PRN Max 4/day HOLLI Lion      And    benztropine  0.5 mg Intramuscular Q4H PRN Max 4/day HOLLI Lion      haloperidol lactate  5 mg Intramuscular Q4H PRN Max 4/day HOLLI Lion      And    LORazepam  2 mg Intramuscular Q4H PRN Max 4/day HOLLI Lion      And    benztropine  1 mg Intramuscular Q4H PRN Max 4/day HOLLI Lion      bisacodyl  10 mg Rectal Daily PRN HOLLI Lion      calcium carbonate  500 mg Oral BID PRN HOLLI Monge      cloZAPine  250 mg Oral HS Bora Rosario MD      hydrOXYzine HCL  50 mg Oral Q6H PRN Max 4/day Jordan C Holter, DO      Or    diphenhydrAMINE  50 mg Intramuscular Q6H PRN Jordan C Holter, DO      hydrOXYzine HCL  50 mg Oral Q6H PRN Max 4/day HOLLI Lion      Or    diphenhydrAMINE  50 mg Intramuscular Q6H PRN HOLLI Lion       diphenhydrAMINE-zinc acetate   Topical BID PRN HOLLI Lion      docusate sodium  100 mg Oral BID Jourdan Dickens, DO      escitalopram  20 mg Oral Daily HOLLI Lion      famotidine  20 mg Oral BID PRN HOLLI Monge      fluticasone  1 spray Each Nare Daily PRN HOLLI Monge      haloperidol  1 mg Oral Q6H PRN HOLLI Lion      haloperidol  2.5 mg Oral Q4H PRN Max 4/day HOLLI Lion      haloperidol  5 mg Oral Q4H PRN Max 4/day HOLLI Lion      haloperidol  5 mg Oral BID Bora Rosario MD      hydroCHLOROthiazide  12.5 mg Oral Daily Pia Mantilla, HOLLI      hydrocortisone   Topical 4x Daily PRN HOLLI Lion      hydrOXYzine HCL  100 mg Oral Q6H PRN Max 4/day Ion C Holter, DO      Or    LORazepam  2 mg Intramuscular Q6H PRN [facility] Holter, DO      hydrOXYzine HCL  100 mg Oral Q6H PRN Max 4/day HOLLI Lion      Or    LORazepam  2 mg Intramuscular Q6H PRN HOLLI Lion      hydrOXYzine HCL  25 mg Oral Q6H PRN Max 4/day [facility] Holter, DO      ibuprofen  600 mg Oral Q8H PRN HOLLI Lion      influenza vaccine  0.5 mL Intramuscular Once Bora Rosario MD      loratadine  10 mg Oral Daily Brina Guillen MD      magnesium hydroxide  30 mL Oral Once Jourdan Dickens, DO      melatonin  3 mg Oral HS PRN Bora Rosario MD      melatonin  9 mg Oral HS Bora Rosario MD      methocarbamol  500 mg Oral Q6H PRN HOLLI Lion      multivitamin-minerals  1 tablet Oral Daily HOLLI Monge      nicotine polacrilex  2 mg Oral Q4H PRN Bora Rosario MD      OLANZapine  5 mg Oral Q4H PRN Max 3/day Ion C Holter, DO      Or    OLANZapine  2.5 mg Intramuscular Q4H PRN Max 3/day Ion C Holter, DO      OLANZapine  5 mg Oral Q3H PRN Max 3/day Ion C Holter, DO      Or    OLANZapine  5 mg Intramuscular Q3H PRN Max 3/day Ion C Holter, DO      OLANZapine  2.5 mg Oral Q4H PRN Max 6/day Jordan C Holter,       ondansetron  4  "mg Oral Q6H PRN HOLLI Lion      pantoprazole  20 mg Oral QAM Jordan C Holter, DO      polyethylene glycol  17 g Oral Daily PRN HOLLI Ghotra      polyethylene glycol  17 g Oral Daily Jourdan Dickens, DO      propranolol  10 mg Oral Q12H KAYLIE HOLLI Lion      saliva substitute  5 spray Mouth/Throat 4x Daily PRN Mary Jo Reich PA-C      senna-docusate sodium  1 tablet Oral Daily PRN Jordan C Holter, DO      senna-docusate sodium  2 tablet Oral HS Jourdan Dickens, DO      traZODone  50 mg Oral HS PRN HOLLI Lion      white petrolatum-mineral oil   Topical TID PRN HOLLI Lion         Risks, benefits and possible side effects of Medications:   Risks, benefits, and possible side effects of medications explained to patient and patient verbalizes understanding.      Dual Antipsychotic Rationale: Patient is on dual antipsychotic therapy due to multiple failed medication trails and poor symptom control on a single agent.     Mental Status Evaluation:  Appearance:  age appropriate, casually dressed, adequate grooming, looks stated age, in no acute distress   Behavior:  pleasant, cooperative, calm   Speech:  normal rate and volume, clear, scant   Mood:  \"Okay\"   Affect:  constricted, depressed   Thought Process:  logical, coherent, linear, normal rate of thoughts   Associations: concrete associations   Thought Content:  paranoid ideation in the setting of AH threatening to hurt him and family   Perceptual Disturbances: Ongoing auditory hallucinations that threaten to hurt him, denies command or visual hallucinations   Risk Potential: Suicidal ideation - Denies  Homicidal ideation - Denies  Potential for aggression - Not at present    Sensorium:  oriented to person, place, and time/date   Memory:  recent and remote memory grossly intact   Consciousness:  alert but limited by fatigue   Attention/Concentration: attention span and concentration appear shorter than expected for age   Insight:  " fair   Judgment: limited   Gait/Station: in bed   Motor Activity: no abnormal movements     Recommended Treatment:   See above for assessment and plan.    Counseling/Coordination of Care    Total unit time spent today was greater than 15 minutes. Greater than 50% of total time was spent with the patient and/or patient's relatives and/or coordination of patient's care.     Patient's rights, confidentiality, exceptions to confidentiality, use of electronic medical record including appropriate staff access to medical record regarding behavioral health services and consent to treatment were reviewed.    Note Share:     This note was not shared with the patient due to reasonable likelihood of causing patient harm     Sharon Pierre   Psychiatry, PA-S

## 2025-02-10 NOTE — NURSING NOTE
"Pt is accepting of medications, morning BP medications held as per parameters. Pt refused miralax and nicotine gum given. Refused breakfast. Pt reports anxiety, depression and AH being \" really heavy\", but denies all other s/s. Declines attending group and further conversation with writer, however polite in interaction while laying in bed. Keeps to himself and isolative to room thus far. No new concerns otherwise.   "

## 2025-02-10 NOTE — ASSESSMENT & PLAN NOTE
Reviewed  2/10/25  Follows with medical team  No associated orders from this encounter found during lookback period of 72 hours.

## 2025-02-10 NOTE — ASSESSMENT & PLAN NOTE
Reviewed  2/10/25  Follows with medical team on hydrochlorothiazide and Norvasc  Blood pressure remains stable at this time.  No associated orders from this encounter found during lookback period of 72 hours.

## 2025-02-10 NOTE — PLAN OF CARE
Problem: Alteration in Thoughts and Perception  Goal: Agree to be compliant with medication regime, as prescribed and report medication side effects  Description: Interventions:  - Offer appropriate PRN medication and supervise ingestion; conduct AIMS, as needed   Outcome: Progressing     Problem: Depression  Goal: Verbalize thoughts and feelings  Description: Interventions:  - Assess and re-assess patient's level of risk   - Engage patient in 1:1 interactions, daily, for a minimum of 15 minutes   - Encourage patient to express feelings, fears, frustrations, hopes   Outcome: Progressing  Goal: Refrain from harming self  Description: Interventions:  - Monitor patient closely, per order   - Supervise medication ingestion, monitor effects and side effects   Outcome: Progressing  Goal: Refrain from isolation  Description: Interventions:  - Develop a trusting relationship   - Encourage socialization   Outcome: Progressing  Goal: Refrain from self-neglect  Outcome: Progressing     Problem: Anxiety  Goal: Anxiety is at manageable level  Description: Interventions:  - Assess and monitor patient's anxiety level.   - Monitor for signs and symptoms (heart palpitations, chest pain, shortness of breath, headaches, nausea, feeling jumpy, restlessness, irritable, apprehensive).   - Collaborate with interdisciplinary team and initiate plan and interventions as ordered.  - [place] patient to unit/surroundings  - Explain treatment plan  - Encourage participation in care  - Encourage verbalization of concerns/fears  - Identify coping mechanisms  - Assist in developing anxiety-reducing skills  - Administer/offer alternative therapies  - Limit or eliminate stimulants  Outcome: Progressing     Problem: Alteration in Orientation  Goal: Treatment Goal: Demonstrate a reduction of confusion and improved orientation to person, place, time and/or situation upon discharge, according to optimum baseline/ability  Outcome: Progressing      Problem: Alteration in Thoughts and Perception  Goal: Treatment Goal: Gain control of psychotic behaviors/thinking, reduce/eliminate presenting symptoms and demonstrate improved reality functioning upon discharge  Outcome: Not Progressing  Goal: Recognize dysfunctional thoughts, communicate reality-based thoughts at the time of discharge  Description: Interventions:  - Provide medication and psycho-education to assist patient in compliance and developing insight into his/her illness   Outcome: Not Progressing     Problem: Alteration in Orientation  Goal: Interact with staff daily  Description: Interventions:  - Assess and re-assess patient's level of orientation  - Engage patient in 1 on 1 interactions, daily, for a minimum of 15 minutes   - Establish rapport/trust with patient   Outcome: Not Progressing

## 2025-02-10 NOTE — NURSING NOTE
Maintained on ongoing SAFE precaution without incident on this shift. Awake, alert,pleasant and cooperative.  Awake, alert,pleasant and cooperative.  Continues to be compliant with medication regimen. Still struggling with the same auditory hallucination,  utilizing proper coping skill.  Attended the super bowl party with his peers.   Behavior control.

## 2025-02-10 NOTE — ASSESSMENT & PLAN NOTE
Reviewed  2/10/25  Follows with medical team and given dietary counseling and need for exercise  No associated orders from this encounter found during lookback period of 72 hours.

## 2025-02-10 NOTE — PROGRESS NOTES
02/10/25 0847   Team Meeting   Meeting Type Daily Rounds   Team Members Present   Team Members Present Physician;Nurse;;Other (Discipline and Name)   Physician Team Member Victor Manuel   Nursing Team Member Thaddeus   Social Work Team Member Jose J ORTEGA   Other (Discipline and Name) Wang PCM   Patient/Family Present   Patient Present No   Patient's Family Present No     Groups Participation  4/10.   Patient's compliant with meications. Patient reports increased anxiety, depression and AH. Isolating in his room.

## 2025-02-10 NOTE — ASSESSMENT & PLAN NOTE
Reviewed 2/10/25  On nicotine gum as needed and encouraged smoking cessation  Continue to encourage cessation upon discharge  No associated orders from this encounter found during lookback period of 72 hours.

## 2025-02-10 NOTE — ASSESSMENT & PLAN NOTE
Reviewed 2/10/25  Follows with medical  Continue famotidine 20 mg tablet BID per medical team   Continue pantoprazole EC 20 mg tablet every morning per medical team  This is a chronic condition, and is stable unless otherwise noted.  No associated orders from this encounter found during lookback period of 72 hours.

## 2025-02-11 PROCEDURE — 99232 SBSQ HOSP IP/OBS MODERATE 35: CPT | Performed by: PSYCHIATRY & NEUROLOGY

## 2025-02-11 RX ADMIN — DOCUSATE SODIUM 100 MG: 100 CAPSULE, LIQUID FILLED ORAL at 17:22

## 2025-02-11 RX ADMIN — MULTIPLE VITAMINS W/ MINERALS TAB 1 TABLET: TAB ORAL at 09:23

## 2025-02-11 RX ADMIN — MELATONIN TAB 3 MG 9 MG: 3 TAB at 21:09

## 2025-02-11 RX ADMIN — SENNOSIDES AND DOCUSATE SODIUM 2 TABLET: 50; 8.6 TABLET ORAL at 21:10

## 2025-02-11 RX ADMIN — NICOTINE POLACRILEX 2 MG: 2 GUM, CHEWING ORAL at 09:23

## 2025-02-11 RX ADMIN — HALOPERIDOL 5 MG: 5 TABLET ORAL at 17:22

## 2025-02-11 RX ADMIN — LORATADINE 10 MG: 10 TABLET ORAL at 09:23

## 2025-02-11 RX ADMIN — PROPRANOLOL HYDROCHLORIDE 10 MG: 10 TABLET ORAL at 09:23

## 2025-02-11 RX ADMIN — PANTOPRAZOLE SODIUM 20 MG: 20 TABLET, DELAYED RELEASE ORAL at 09:23

## 2025-02-11 RX ADMIN — NICOTINE POLACRILEX 2 MG: 2 GUM, CHEWING ORAL at 17:30

## 2025-02-11 RX ADMIN — HALOPERIDOL 5 MG: 5 TABLET ORAL at 09:23

## 2025-02-11 RX ADMIN — CLOZAPINE 250 MG: 25 TABLET ORAL at 21:09

## 2025-02-11 RX ADMIN — ESCITALOPRAM OXALATE 20 MG: 10 TABLET, FILM COATED ORAL at 09:23

## 2025-02-11 RX ADMIN — NICOTINE POLACRILEX 2 MG: 2 GUM, CHEWING ORAL at 13:39

## 2025-02-11 RX ADMIN — HYDROCHLOROTHIAZIDE 12.5 MG: 12.5 TABLET ORAL at 09:23

## 2025-02-11 RX ADMIN — DOCUSATE SODIUM 100 MG: 100 CAPSULE, LIQUID FILLED ORAL at 09:23

## 2025-02-11 RX ADMIN — PROPRANOLOL HYDROCHLORIDE 10 MG: 10 TABLET ORAL at 21:10

## 2025-02-11 NOTE — ASSESSMENT & PLAN NOTE
"  Patient continues to endorse AH that threaten they are going to hurt him/family which has been ongoing. Patient reported to nursing this morning that his AH, depression, and anxiety have been \"really heavy.\" He continues to report increased fatigue and remains self isolative in his room.       Continue medication as follows:  Continue Clozapine 250 mg QHS for psychosis - increased 12/11/24  02/04: , BNP 10, CRP 9.5  02/04: ANC 4.42  To consider further careful titration  CBC w/diff and CK every 2 weeks  Continue haldol to 5 mg BID for psychosis   Continue Lexapro 20 mg daily for depression and anxiety  Continue Propanolol 10 mg BID for anxiety   Continue Melatonin 9 mg QHS for sleep  Continue Colace and Senna-Docusate sodium 50 mg tablet QHS for constipation  Continue Zofran 4 mg tablet Q6H PRN for nausea   Continue PRN mouth kote for xerostomia     - Pt remains on dual antipsychotic Tx due to failure on monotherapy   .  - S/p ECT treatments.  - ACT team referral was made for him living back with his aunt once MA funding comes through from the [facility] and sister will try to reapply  No associated orders from this encounter found during lookback period of 72 hours.    "

## 2025-02-11 NOTE — NURSING NOTE
Pt is accepting of medications, but refuses miralax and AM amlodipine held for parameters. Pt refused breakfast and consumed 100% of lunch. Pt reports anxiety, depression and AH, but denies all other s/s. Pt isolative in the early morning, but more visible before lunch. Pt smiles and brightens on approach with conversation. Utilizing coping skills reading bible, journaling and attending groups. Pt otherwise offers no new concerns.

## 2025-02-11 NOTE — ASSESSMENT & PLAN NOTE
Follows with medical team  Continue senna-docusate sodium dose per medical team.   Continue with regular bowel movements, refused miralax this AM.  No associated orders from this encounter found during lookback period of 72 hours.

## 2025-02-11 NOTE — CMS CERTIFICATION NOTE
Recertification: Based upon physical, mental and social evaluations, I certify that inpatient psychiatric services continue to be medically necessary for this patient for a duration of greater than 2 midnights for the treatment of  Schizoaffective disorder, bipolar type (HCC) Available alternative community resources still do not meet the patient's mental health care needs. I further attest that an established written individualized plan of care has been updated and is outlined in the patient's medical records.    Jordan Christopher Holter, DO

## 2025-02-11 NOTE — NURSING NOTE
Maintained on ongoing SAFE precaution without incident on this shift.  Awake, alert,pleasant and cooperative.  Continues to be compliant with medication regimen. Still struggling with the same auditory hallucination,  utilizing proper coping skill.  More visible this evening.  Attended and participated in 4 out of 9 groups.

## 2025-02-11 NOTE — PLAN OF CARE
Problem: Ineffective Coping  Goal: Identifies ineffective coping skills  Outcome: Progressing  Goal: Identifies healthy coping skills  Outcome: Progressing  Goal: Demonstrates healthy coping skills  Outcome: Progressing  Goal: Participates in unit activities  Description: Interventions:  - Provide therapeutic environment   - Provide required programming   - Redirect inappropriate behaviors   Outcome: Not Progressing   He continues to work on his coping skills however currently is not attending many groups.

## 2025-02-11 NOTE — PROGRESS NOTES
"    Psychiatric Progress Note - Department of Behavioral Health   Alberto Berumen 29 y.o. male MRN: [id number]  Unit/Bed#: Merged with [facility] 112-02 Encounter: [id number]    ASSESSMENT & PLAN     Assessment & Plan  Schizoaffective disorder, bipolar type (HCC)    Patient continues to endorse AH that threaten they are going to hurt him/family which has been ongoing. Patient reported to nursing this morning that his AH, depression, and anxiety have been \"really heavy.\" He continues to report increased fatigue and remains self isolative in his room.       Continue medication as follows:  Continue Clozapine 250 mg QHS for psychosis - increased 12/11/24 02/04: , BNP 10, CRP 9.5  02/04: ANC 4.42  To consider further careful titration  CBC w/diff and CK every 2 weeks  Continue haldol to 5 mg BID for psychosis   Continue Lexapro 20 mg daily for depression and anxiety  Continue Propanolol 10 mg BID for anxiety   Continue Melatonin 9 mg QHS for sleep  Continue Colace and Senna-Docusate sodium 50 mg tablet QHS for constipation  Continue Zofran 4 mg tablet Q6H PRN for nausea   Continue PRN mouth kote for xerostomia     - Pt remains on dual antipsychotic Tx due to failure on monotherapy   .  - S/p ECT treatments.  - ACT team referral was made for him living back with his aunt once MA funding comes through from the [facility] and sister will try to reapply  No associated orders from this encounter found during lookback period of 72 hours.    Chronic idiopathic constipation    Follows with medical team  Continue senna-docusate sodium dose per medical team.   Continue with regular bowel movements, refused miralax this AM.  No associated orders from this encounter found during lookback period of 72 hours.  GERD (gastroesophageal reflux disease)    Follows with medical  Continue famotidine 20 mg tablet BID per medical team   Continue pantoprazole EC 20 mg tablet every morning per medical team  This is a chronic condition, and is " stable unless otherwise noted.  No associated orders from this encounter found during lookback period of 72 hours.  Tobacco abuse    On nicotine gum as needed and encouraged smoking cessation  Continue to encourage cessation upon discharge  No associated orders from this encounter found during lookback period of 72 hours.  Elevated hemoglobin A1c    Follows with medical team  No associated orders from this encounter found during lookback period of 72 hours.  Class 2 obesity in adult  Reviewed  2/10/25  Follows with medical team and given dietary counseling and need for exercise  No associated orders from this encounter found during lookback period of 72 hours.    Primary hypertension    Follows with medical team on hydrochlorothiazide and Norvasc  Blood pressure remains stable at this time.  No associated orders from this encounter found during lookback period of 72 hours.    Treatment Recommendations/Precautions:  Continue to promote patient participation in therapeutic milieu.  Continue medical management per medicine.  Continue previously prescribed psychotropic medication regimen; see below.  Continue behavioral health checks q.15 minutes.   Continue vitals per behavioral health unit protocol.  Discharge date per primary team    SUBJECTIVE     Patient evaluated this a.m. for continuity of care. Patient was discussed at length with nursing and treatment team. Per nursing, patient remains calm, cooperative, intermittently interactive in the milieu, adherent to his medications less any acute adverse effects . No acute distress is noted throughout evaluation. Alberto Berumen denies suicidal/homicidal ideation in addition to thoughts of self-injury, receptive to crisis planning provided by this writer, contacting for safety in the inpatient setting, admitting to an ability to appropriately confide in staff including this writer. Patient admits to dysphoric mood including depression accompanied by auditory  hallucinations that are negative and derrogatory in content, denying any additional psychiatric complaints/concerns.     PSYCHIATRIC REVIEW OF SYSTEMS     Behavior over the last 24 hours:  unchanged  Sleep: adequate  Appetite: adequate  Medication side effects: No    REVIEW OF SYSTEMS   Review of systems: no complaints    OBJECTIVE     Vital Signs in Past 24 Hours:  Temp:  [97.5 °F (36.4 °C)-98.3 °F (36.8 °C)] 98.3 °F (36.8 °C)  HR:  [56-94] 56  BP: (113-135)/(56-79) 113/56  Resp:  [18] 18  SpO2:  [98 %] 98 %  O2 Device: None (Room air)    Intake/Output in Past 24 hours:  No intake/output data recorded.  No intake/output data recorded.        Laboratory Results:  I have personally reviewed all pertinent laboratory/tests results.  Most Recent Labs:   Lab Results   Component Value Date    WBC 9.74 02/04/2025    RBC 5.54 02/04/2025    HGB 12.9 02/04/2025    HCT 43.5 02/04/2025     02/04/2025    RDW 14.1 02/04/2025    NEUTROABS 4.42 02/04/2025    SODIUM 140 10/16/2024    K 3.8 10/16/2024     10/16/2024    CO2 29 10/16/2024    BUN 9 10/16/2024    CREATININE 0.91 10/16/2024    GLUC 94 10/16/2024    GLUF 94 10/16/2024    CALCIUM 9.5 10/16/2024    AST 26 09/30/2024    ALT 42 09/30/2024    ALKPHOS 64 09/30/2024    TP 6.6 09/30/2024    ALB 3.8 09/30/2024    TBILI 0.47 09/30/2024    CHOLESTEROL 156 03/14/2024    HDL 44 03/14/2024    TRIG 133 03/14/2024    LDLCALC 85 03/14/2024    NONHDLC 112 03/14/2024    LITHIUM 0.61 01/09/2024    [id number] 1.062 11/15/2023    HGBA1C 5.0 04/01/2024    EAG 97 04/01/2024       Behavioral Health Medications: all current active meds have been reviewed and current meds:   Current Facility-Administered Medications:     acetaminophen (TYLENOL) tablet 650 mg, Q4H PRN    acetaminophen (TYLENOL) tablet 650 mg, Q6H PRN    aluminum-magnesium hydroxide-simethicone (MAALOX) oral suspension 30 mL, Q4H PRN    amLODIPine (NORVASC) tablet 5 mg, Daily    Artificial Tears ophthalmic solution 1  drop, Q3H PRN    haloperidol lactate (HALDOL) injection 2.5 mg, Q4H PRN Max 4/day **AND** LORazepam (ATIVAN) injection 1 mg, Q4H PRN Max 4/day **AND** benztropine (COGENTIN) injection 0.5 mg, Q4H PRN Max 4/day    haloperidol lactate (HALDOL) injection 5 mg, Q4H PRN Max 4/day **AND** LORazepam (ATIVAN) injection 2 mg, Q4H PRN Max 4/day **AND** benztropine (COGENTIN) injection 1 mg, Q4H PRN Max 4/day    bisacodyl (DULCOLAX) rectal suppository 10 mg, Daily PRN    calcium carbonate (TUMS) chewable tablet 500 mg, BID PRN    cloZAPine (CLOZARIL) tablet 250 mg, HS    hydrOXYzine HCL (ATARAX) tablet 50 mg, Q6H PRN Max 4/day **OR** diphenhydrAMINE (BENADRYL) injection 50 mg, Q6H PRN    hydrOXYzine HCL (ATARAX) tablet 50 mg, Q6H PRN Max 4/day **OR** diphenhydrAMINE (BENADRYL) injection 50 mg, Q6H PRN    diphenhydrAMINE-zinc acetate (BENADRYL) 2-0.1 % cream, BID PRN    docusate sodium (COLACE) capsule 100 mg, BID    escitalopram (LEXAPRO) tablet 20 mg, Daily    famotidine (PEPCID) tablet 20 mg, BID PRN    fluticasone (FLONASE) 50 mcg/act nasal spray 1 spray, Daily PRN    haloperidol (HALDOL) tablet 1 mg, Q6H PRN    haloperidol (HALDOL) tablet 2.5 mg, Q4H PRN Max 4/day    haloperidol (HALDOL) tablet 5 mg, Q4H PRN Max 4/day    [START ON 2/13/2025] haloperidol (HALDOL) tablet 7.5 mg, BID    hydroCHLOROthiazide tablet 12.5 mg, Daily    hydrocortisone 1 % ointment, 4x Daily PRN    hydrOXYzine HCL (ATARAX) tablet 100 mg, Q6H PRN Max 4/day **OR** LORazepam (ATIVAN) injection 2 mg, Q6H PRN    hydrOXYzine HCL (ATARAX) tablet 100 mg, Q6H PRN Max 4/day **OR** LORazepam (ATIVAN) injection 2 mg, Q6H PRN    hydrOXYzine HCL (ATARAX) tablet 25 mg, Q6H PRN Max 4/day    ibuprofen (MOTRIN) tablet 600 mg, Q8H PRN    influenza vaccine (PF) (Fluarix) IM injection 0.5 mL, Once    loratadine (CLARITIN) tablet 10 mg, Daily    magnesium hydroxide (MILK OF MAGNESIA) oral suspension 30 mL, Once    melatonin tablet 3 mg, HS PRN    melatonin tablet 9 mg,  HS    methocarbamol (ROBAXIN) tablet 500 mg, Q6H PRN    multivitamin-minerals (CENTRUM) tablet 1 tablet, Daily    nicotine polacrilex (NICORETTE) gum 2 mg, Q4H PRN    OLANZapine (ZyPREXA) tablet 5 mg, Q4H PRN Max 3/day **OR** OLANZapine (ZyPREXA) IM injection 2.5 mg, Q4H PRN Max 3/day    OLANZapine (ZyPREXA) tablet 5 mg, Q3H PRN Max 3/day **OR** OLANZapine (ZyPREXA) IM injection 5 mg, Q3H PRN Max 3/day    OLANZapine (ZyPREXA) tablet 2.5 mg, Q4H PRN Max 6/day    ondansetron (ZOFRAN-ODT) dispersible tablet 4 mg, Q6H PRN    pantoprazole (PROTONIX) EC tablet 20 mg, QAM    polyethylene glycol (MIRALAX) packet 17 g, Daily PRN    polyethylene glycol (MIRALAX) packet 17 g, Daily    propranolol (INDERAL) tablet 10 mg, Q12H KAYLIE    saliva substitute (MOUTH KOTE) mucosal solution 5 spray, 4x Daily PRN    senna-docusate sodium (SENOKOT S) 8.6-50 mg per tablet 1 tablet, Daily PRN    senna-docusate sodium (SENOKOT S) 8.6-50 mg per tablet 2 tablet, HS    traZODone (DESYREL) tablet 50 mg, HS PRN    white petrolatum-mineral oil (EUCERIN,HYDROCERIN) cream, TID PRN.    Risks, benefits and possible side effects of Medications:   Risks, benefits, and possible side effects of medications explained to patient and patient verbalizes understanding and agreement for treatment.    Dual Antipsychotic Rationale: lack of response to monotherapy     Mental Status Evaluation:  Appearance:  age appropriate, casually dressed, and disheveled   Behavior:  psychomotor retardation   Speech:  soft and scant   Mood:  depressed and dysthymic   Affect:  blunted   Language sparse   Thought Process:  goal directed   Thought Content:  Negative thinking   Perceptual Disturbances: Auditory hallucinations without commands   Risk Potential: Suicidal Ideations none, Homicidal Ideations none, and Potential for Aggression No   Sensorium:  person, place, and time/date   Cognition:  recent and remote memory grossly intact   Consciousness:  alert and awake    Attention:  attention span appeared shorter than expected for age   Insight:  limited   Judgment: limited   Intellect Not assessed   Gait/Station: normal gait/station and normal balance   Motor Activity: no abnormal movements     Memory: Short and long term memory  fair     Progress Toward Goals: unchanged, as evidenced by their participation (or lack thereof) in individual, social and therapeutic milieu in addition to adherence to their medication regimen.    Recommended Treatment:   See above for assessment and plan.    Inpatient Psychiatric Certification: Based upon physical, mental and social evaluations, I certify that inpatient psychiatric services are medically necessary for this patient for a duration of greater than 2 midnights for the treatment of Schizoaffective disorder, bipolar type (HCC) including psychotropic medication management, participation in the therapeutic milieu and referrals as indicated. Available alternative community resources do not meet the patient's mental health care needs. I further attest that an established written individualized plan of care has been implemented and is outlined in the patient's medical records.    Counseling/Coordination of Care    I have expended greater than 15 minutes in which more than 50% of this time was expended in counseling/coordination of patient care relating to diagnostic results, prognosis, potential risks and benefits of management options, instructions for appropriate management, patient and/or collateral education, importance of adherence to management and/or risk factor reductions. Patient's rights, confidentiality, exceptions to confidentiality, use of electronic medical record including appropriate staff access to medical record regarding behavioral health services and consent to treatment were reviewed.    Note Share:     This note was not shared with the patient due to reasonable likelihood of causing patient harm     This note has been constructed using  a voice recognition system. There may be translation, syntax,  or grammatical errors. If you have any questions, please contact the dictating provider.    Jordan Christopher Holter,  02/11/25

## 2025-02-11 NOTE — ASSESSMENT & PLAN NOTE
Follows with medical team on hydrochlorothiazide and Norvasc  Blood pressure remains stable at this time.  No associated orders from this encounter found during lookback period of 72 hours.

## 2025-02-11 NOTE — PLAN OF CARE
Problem: Alteration in Thoughts and Perception  Goal: Treatment Goal: Gain control of psychotic behaviors/thinking, reduce/eliminate presenting symptoms and demonstrate improved reality functioning upon discharge  Outcome: Progressing  Goal: Agree to be compliant with medication regime, as prescribed and report medication side effects  Description: Interventions:  - Offer appropriate PRN medication and supervise ingestion; conduct AIMS, as needed   Outcome: Progressing  Goal: Recognize dysfunctional thoughts, communicate reality-based thoughts at the time of discharge  Description: Interventions:  - Provide medication and psycho-education to assist patient in compliance and developing insight into his/her illness   Outcome: Progressing     Problem: Ineffective Coping  Goal: Participates in unit activities  Description: Interventions:  - Provide therapeutic environment   - Provide required programming   - Redirect inappropriate behaviors   Outcome: Progressing  Goal: Patient/Family participate in treatment and DC plans  Description: Interventions:  - Provide therapeutic environment  Outcome: Progressing  Goal: Patient/Family verbalizes awareness of resources  Outcome: Progressing     Problem: Depression  Goal: Treatment Goal: Demonstrate behavioral control of depressive symptoms, verbalize feelings of improved mood/affect, and adopt new coping skills prior to discharge  Outcome: Progressing  Goal: Refrain from harming self  Description: Interventions:  - Monitor patient closely, per order   - Supervise medication ingestion, monitor effects and side effects   Outcome: Progressing  Goal: Refrain from isolation  Description: Interventions:  - Develop a trusting relationship   - Encourage socialization   Outcome: Progressing  Goal: Refrain from self-neglect  Outcome: Progressing     Problem: Anxiety  Goal: Anxiety is at manageable level  Description: Interventions:  - Assess and monitor patient's anxiety level.   -  Monitor for signs and symptoms (heart palpitations, chest pain, shortness of breath, headaches, nausea, feeling jumpy, restlessness, irritable, apprehensive).   - Collaborate with interdisciplinary team and initiate plan and interventions as ordered.  - [place] patient to unit/surroundings  - Explain treatment plan  - Encourage participation in care  - Encourage verbalization of concerns/fears  - Identify coping mechanisms  - Assist in developing anxiety-reducing skills  - Administer/offer alternative therapies  - Limit or eliminate stimulants  Outcome: Progressing     Problem: Alteration in Orientation  Goal: Treatment Goal: Demonstrate a reduction of confusion and improved orientation to person, place, time and/or situation upon discharge, according to optimum baseline/ability  Outcome: Progressing  Goal: Interact with staff daily  Description: Interventions:  - Assess and re-assess patient's level of orientation  - Engage patient in 1 on 1 interactions, daily, for a minimum of 15 minutes   - Establish rapport/trust with patient   Outcome: Progressing  Goal: Express concerns related to confused thinking related to:  Description: Interventions:  - Encourage patient to express feelings, fears, frustrations, hopes  - Assign consistent caregivers   - [place]/re-orient patient as needed  - Allow comfort items, as appropriate  - Provide visual cues, signs, etc.   Outcome: Progressing  Goal: Allow medical examinations, as recommended  Description: Interventions:  - Provide physical/neurological exams and/or referrals, per provider   Outcome: Progressing     Problem: DISCHARGE PLANNING  Goal: Discharge to home with appropriate resources  Description: INTERVENTIONS:  - Identify barriers to discharge w/patient and caregiver  - Arrange for needed discharge resources and transportation as appropriate  - Identify discharge learning needs (meds, wound care, etc.)  - Refer to Case Management Department for coordinating discharge  planning if the patient needs post-hospital services based on physician/advanced practitioner order or complex needs related to functional status, cognitive ability, or social support system  Outcome: Progressing     Problem: Alteration in Thoughts and Perception  Goal: Refrain from acting on delusional thinking/internal stimuli  Description: Interventions:  - Monitor patient closely, per order   - Utilize least restrictive measures   - Set reasonable limits, give positive feedback for acceptable   - Administer medications as ordered and monitor of potential side effects  Outcome: Not Progressing

## 2025-02-11 NOTE — PROGRESS NOTES
02/11/25 [id number]   Team Meeting   Meeting Type Daily Rounds   Team Members Present   Team Members Present Physician;Nurse;;Other (Discipline and Name)   Physician Team Member Holter & Sharma   Nursing Team Member Claudia   Social Work Team Member Jacob BROOKS   Other (Discipline and Name) ALMAS Wang   Patient/Family Present   Patient Present No   Patient's Family Present No     Group Participation: 4/9  Isolating in his room. More visible on unit in the evening. Depressed mood.

## 2025-02-12 PROCEDURE — 99232 SBSQ HOSP IP/OBS MODERATE 35: CPT | Performed by: PSYCHIATRY & NEUROLOGY

## 2025-02-12 RX ADMIN — NICOTINE POLACRILEX 2 MG: 2 GUM, CHEWING ORAL at 12:52

## 2025-02-12 RX ADMIN — PANTOPRAZOLE SODIUM 20 MG: 20 TABLET, DELAYED RELEASE ORAL at 09:15

## 2025-02-12 RX ADMIN — MELATONIN TAB 3 MG 9 MG: 3 TAB at 21:19

## 2025-02-12 RX ADMIN — HYDROCHLOROTHIAZIDE 12.5 MG: 12.5 TABLET ORAL at 09:15

## 2025-02-12 RX ADMIN — PROPRANOLOL HYDROCHLORIDE 10 MG: 10 TABLET ORAL at 21:19

## 2025-02-12 RX ADMIN — PROPRANOLOL HYDROCHLORIDE 10 MG: 10 TABLET ORAL at 09:16

## 2025-02-12 RX ADMIN — DOCUSATE SODIUM 100 MG: 100 CAPSULE, LIQUID FILLED ORAL at 17:18

## 2025-02-12 RX ADMIN — CLOZAPINE 250 MG: 25 TABLET ORAL at 21:18

## 2025-02-12 RX ADMIN — SENNOSIDES AND DOCUSATE SODIUM 2 TABLET: 50; 8.6 TABLET ORAL at 21:19

## 2025-02-12 RX ADMIN — HALOPERIDOL 5 MG: 5 TABLET ORAL at 09:16

## 2025-02-12 RX ADMIN — MULTIPLE VITAMINS W/ MINERALS TAB 1 TABLET: TAB ORAL at 09:15

## 2025-02-12 RX ADMIN — LORATADINE 10 MG: 10 TABLET ORAL at 09:16

## 2025-02-12 RX ADMIN — HALOPERIDOL 5 MG: 5 TABLET ORAL at 17:18

## 2025-02-12 RX ADMIN — ESCITALOPRAM OXALATE 20 MG: 10 TABLET, FILM COATED ORAL at 09:16

## 2025-02-12 RX ADMIN — NICOTINE POLACRILEX 2 MG: 2 GUM, CHEWING ORAL at 21:18

## 2025-02-12 RX ADMIN — NICOTINE POLACRILEX 2 MG: 2 GUM, CHEWING ORAL at 17:18

## 2025-02-12 RX ADMIN — NICOTINE POLACRILEX 2 MG: 2 GUM, CHEWING ORAL at 09:16

## 2025-02-12 RX ADMIN — DOCUSATE SODIUM 100 MG: 100 CAPSULE, LIQUID FILLED ORAL at 09:16

## 2025-02-12 NOTE — PROGRESS NOTES
02/12/25 0820   Team Meeting   Meeting Type Daily Rounds   Team Members Present   Team Members Present Physician;Nurse;;Other (Discipline and Name)   Physician Team Member Holter, Sharma   Nursing Team Member Claudia   Social Work Team Member Jose J ORTEGA   Other (Discipline and Name) Wang PCM   Patient/Family Present   Patient Present No   Patient's Family Present No     Groups Participation  8/10.   Patient's compliant with medications. He's engaged in his treatment. Continues to report depression and AH. Waiting for [facility] ACT to discharge home. Patient had a brighter affect and decreased isolation.

## 2025-02-12 NOTE — ASSESSMENT & PLAN NOTE
"  Patient continues to endorse AH that threaten they are going to hurt him/family which has been ongoing. Patient reported to nursing this morning that his AH, depression, and anxiety have been \"really heavy.\" He continues to report increased fatigue and remains self isolative in his room.       Continue medication as follows:  Continue Clozapine 250 mg QHS for psychosis (previously increased 12/11/24)  02/04: , BNP 10, CRP 9.5  02/04: ANC 4.42  To consider further careful titration  CBC w/diff and CK every 2 weeks  Continue upward titration of haldol to 5 to 7.5 mg BID for psychosis   Continue Lexapro 20 mg daily for depression and anxiety  Continue Propanolol 10 mg BID for anxiety   Continue Melatonin 9 mg QHS for sleep  Continue Colace and Senna-Docusate sodium 50 mg tablet QHS for constipation  Continue Zofran 4 mg tablet Q6H PRN for nausea   Continue PRN mouth kote for xerostomia     - Pt remains on dual antipsychotic Tx due to failure on monotherapy   .  - S/p ECT treatments.  - ACT team referral was made for him living back with his aunt once MA funding comes through from the [facility] and sister will try to reapply  No associated orders from this encounter found during lookback period of 72 hours.    "

## 2025-02-12 NOTE — NURSING NOTE
Patient pleasant, calm and cooperative, able to make needs known. Is visible on the module, social with peers, attends groups. Denies SI/HI/VH, reports occasional AH, mild anxiety and depression. Medication compliant. Will continue to monitor.

## 2025-02-12 NOTE — PROGRESS NOTES
"    Psychiatric Progress Note - Department of Behavioral Health   Alberto Berumen 29 y.o. male MRN: [id number]  Unit/Bed#: [facility] 112-02 Encounter: [id number]    ASSESSMENT & PLAN     Assessment & Plan  Schizoaffective disorder, bipolar type (HCC)    Patient continues to endorse AH that threaten they are going to hurt him/family which has been ongoing. Patient reported to nursing this morning that his AH, depression, and anxiety have been \"really heavy.\" He continues to report increased fatigue and remains self isolative in his room.       Continue medication as follows:  Continue Clozapine 250 mg QHS for psychosis (previously increased 12/11/24)  02/04: , BNP 10, CRP 9.5  02/04: ANC 4.42  To consider further careful titration  CBC w/diff and CK every 2 weeks  Continue upward titration of haldol to 5 to 7.5 mg BID for psychosis   Continue Lexapro 20 mg daily for depression and anxiety  Continue Propanolol 10 mg BID for anxiety   Continue Melatonin 9 mg QHS for sleep  Continue Colace and Senna-Docusate sodium 50 mg tablet QHS for constipation  Continue Zofran 4 mg tablet Q6H PRN for nausea   Continue PRN mouth kote for xerostomia     - Pt remains on dual antipsychotic Tx due to failure on monotherapy   .  - S/p ECT treatments.  - ACT team referral was made for him living back with his aunt once MA funding comes through from the [facility] and sister will try to reapply  No associated orders from this encounter found during lookback period of 72 hours.    Chronic idiopathic constipation    Follows with medical team  Continue senna-docusate sodium dose per medical team.   Continue with regular bowel movements, refused miralax this AM.  No associated orders from this encounter found during lookback period of 72 hours.  GERD (gastroesophageal reflux disease)    Follows with medical  Continue famotidine 20 mg tablet BID per medical team   Continue pantoprazole EC 20 mg tablet every morning per medical " team  This is a chronic condition, and is stable unless otherwise noted.  No associated orders from this encounter found during lookback period of 72 hours.  Tobacco abuse    On nicotine gum as needed and encouraged smoking cessation  Continue to encourage cessation upon discharge  No associated orders from this encounter found during lookback period of 72 hours.  Elevated hemoglobin A1c    Follows with medical team  No associated orders from this encounter found during lookback period of 72 hours.  Class 2 obesity in adult  Reviewed  2/10/25  Follows with medical team and given dietary counseling and need for exercise  No associated orders from this encounter found during lookback period of 72 hours.    Primary hypertension    Follows with medical team on hydrochlorothiazide and Norvasc  Blood pressure remains stable at this time.  No associated orders from this encounter found during lookback period of 72 hours.    Treatment Recommendations/Precautions:  Continue to promote patient participation in therapeutic milieu.  Continue medical management per medicine.  Continue previously prescribed psychotropic medication regimen; see below.  Continue behavioral health checks q.15 minutes.   Continue vitals per behavioral health unit protocol.  Discharge date per primary team    SUBJECTIVE     Patient evaluated this a.m. for continuity of care. Patient was discussed at length with nursing and treatment team. Per nursing, patient remains calm, cooperative, intermittently interactive in the milieu, adherent to his medications less any acute adverse effects. No acute distress is noted throughout evaluation. Alberto Berumen denies suicidal/homicidal ideation in addition to thoughts of self-injury, receptive to crisis planning provided by this writer, contacting for safety in the inpatient setting, admitting to an ability to appropriately confide in staff including this writer. Patient admits to mood dysphoria including  depression and anxiety accompanied by auditory hallucinations that are negative in content with intrusive thoughts that his relatives will be murdered, agreeable to upward titration of Haldol to address aforementioned psychosis    PSYCHIATRIC REVIEW OF SYSTEMS     Behavior over the last 24 hours:  unchanged  Sleep: adequate  Appetite: adequate  Medication side effects: No    REVIEW OF SYSTEMS   Review of systems: no complaints    OBJECTIVE     Vital Signs in Past 24 Hours:  Temp:  [97.4 °F (36.3 °C)] 97.4 °F (36.3 °C)  HR:  [70-91] 70  BP: (119-136)/(59-82) 119/59  Resp:  [16] 16  SpO2:  [98 %] 98 %  O2 Device: None (Room air)    Intake/Output in Past 24 hours:  No intake/output data recorded.  No intake/output data recorded.        Laboratory Results:  I have personally reviewed all pertinent laboratory/tests results.  Most Recent Labs:   Lab Results   Component Value Date    WBC 9.74 02/04/2025    RBC 5.54 02/04/2025    HGB 12.9 02/04/2025    HCT 43.5 02/04/2025     02/04/2025    RDW 14.1 02/04/2025    NEUTROABS 4.42 02/04/2025    SODIUM 140 10/16/2024    K 3.8 10/16/2024     10/16/2024    CO2 29 10/16/2024    BUN 9 10/16/2024    CREATININE 0.91 10/16/2024    GLUC 94 10/16/2024    GLUF 94 10/16/2024    CALCIUM 9.5 10/16/2024    AST 26 09/30/2024    ALT 42 09/30/2024    ALKPHOS 64 09/30/2024    TP 6.6 09/30/2024    ALB 3.8 09/30/2024    TBILI 0.47 09/30/2024    CHOLESTEROL 156 03/14/2024    HDL 44 03/14/2024    TRIG 133 03/14/2024    LDLCALC 85 03/14/2024    NONHDLC 112 03/14/2024    LITHIUM 0.61 01/09/2024    ZAY4LTWVTJEP 1.062 11/15/2023    HGBA1C 5.0 04/01/2024    EAG 97 04/01/2024       Behavioral Health Medications: all current active meds have been reviewed and current meds:   Current Facility-Administered Medications:     acetaminophen (TYLENOL) tablet 650 mg, Q4H PRN    acetaminophen (TYLENOL) tablet 650 mg, Q6H PRN    aluminum-magnesium hydroxide-simethicone (MAALOX) oral suspension 30 mL, Q4H  PRN    amLODIPine (NORVASC) tablet 5 mg, Daily    Artificial Tears ophthalmic solution 1 drop, Q3H PRN    haloperidol lactate (HALDOL) injection 2.5 mg, Q4H PRN Max 4/day **AND** LORazepam (ATIVAN) injection 1 mg, Q4H PRN Max 4/day **AND** benztropine (COGENTIN) injection 0.5 mg, Q4H PRN Max 4/day    haloperidol lactate (HALDOL) injection 5 mg, Q4H PRN Max 4/day **AND** LORazepam (ATIVAN) injection 2 mg, Q4H PRN Max 4/day **AND** benztropine (COGENTIN) injection 1 mg, Q4H PRN Max 4/day    bisacodyl (DULCOLAX) rectal suppository 10 mg, Daily PRN    calcium carbonate (TUMS) chewable tablet 500 mg, BID PRN    cloZAPine (CLOZARIL) tablet 250 mg, HS    hydrOXYzine HCL (ATARAX) tablet 50 mg, Q6H PRN Max 4/day **OR** diphenhydrAMINE (BENADRYL) injection 50 mg, Q6H PRN    hydrOXYzine HCL (ATARAX) tablet 50 mg, Q6H PRN Max 4/day **OR** diphenhydrAMINE (BENADRYL) injection 50 mg, Q6H PRN    diphenhydrAMINE-zinc acetate (BENADRYL) 2-0.1 % cream, BID PRN    docusate sodium (COLACE) capsule 100 mg, BID    escitalopram (LEXAPRO) tablet 20 mg, Daily    famotidine (PEPCID) tablet 20 mg, BID PRN    fluticasone (FLONASE) 50 mcg/act nasal spray 1 spray, Daily PRN    haloperidol (HALDOL) tablet 1 mg, Q6H PRN    haloperidol (HALDOL) tablet 2.5 mg, Q4H PRN Max 4/day    haloperidol (HALDOL) tablet 5 mg, Q4H PRN Max 4/day    [START ON 2/13/2025] haloperidol (HALDOL) tablet 7.5 mg, BID    hydroCHLOROthiazide tablet 12.5 mg, Daily    hydrocortisone 1 % ointment, 4x Daily PRN    hydrOXYzine HCL (ATARAX) tablet 100 mg, Q6H PRN Max 4/day **OR** LORazepam (ATIVAN) injection 2 mg, Q6H PRN    hydrOXYzine HCL (ATARAX) tablet 100 mg, Q6H PRN Max 4/day **OR** LORazepam (ATIVAN) injection 2 mg, Q6H PRN    hydrOXYzine HCL (ATARAX) tablet 25 mg, Q6H PRN Max 4/day    ibuprofen (MOTRIN) tablet 600 mg, Q8H PRN    influenza vaccine (PF) (Fluarix) IM injection 0.5 mL, Once    loratadine (CLARITIN) tablet 10 mg, Daily    magnesium hydroxide (MILK OF MAGNESIA)  oral suspension 30 mL, Once    melatonin tablet 3 mg, HS PRN    melatonin tablet 9 mg, HS    methocarbamol (ROBAXIN) tablet 500 mg, Q6H PRN    multivitamin-minerals (CENTRUM) tablet 1 tablet, Daily    nicotine polacrilex (NICORETTE) gum 2 mg, Q4H PRN    OLANZapine (ZyPREXA) tablet 5 mg, Q4H PRN Max 3/day **OR** OLANZapine (ZyPREXA) IM injection 2.5 mg, Q4H PRN Max 3/day    OLANZapine (ZyPREXA) tablet 5 mg, Q3H PRN Max 3/day **OR** OLANZapine (ZyPREXA) IM injection 5 mg, Q3H PRN Max 3/day    OLANZapine (ZyPREXA) tablet 2.5 mg, Q4H PRN Max 6/day    ondansetron (ZOFRAN-ODT) dispersible tablet 4 mg, Q6H PRN    pantoprazole (PROTONIX) EC tablet 20 mg, QAM    polyethylene glycol (MIRALAX) packet 17 g, Daily PRN    polyethylene glycol (MIRALAX) packet 17 g, Daily    propranolol (INDERAL) tablet 10 mg, Q12H KAYLIE    saliva substitute (MOUTH KOTE) mucosal solution 5 spray, 4x Daily PRN    senna-docusate sodium (SENOKOT S) 8.6-50 mg per tablet 1 tablet, Daily PRN    senna-docusate sodium (SENOKOT S) 8.6-50 mg per tablet 2 tablet, HS    traZODone (DESYREL) tablet 50 mg, HS PRN    white petrolatum-mineral oil (EUCERIN,HYDROCERIN) cream, TID PRN.    Risks, benefits and possible side effects of Medications:   Risks, benefits, and possible side effects of medications explained to patient and patient verbalizes understanding and agreement for treatment.    Dual Antipsychotic Rationale: lack of response to monotherapy in setting of chronic psychiatric illness      Mental Status Evaluation:  Appearance:  age appropriate, casually dressed, and disheveled   Behavior:  psychomotor retardation   Speech:  soft and scant, paucity of speech   Mood:  anxious, depressed, and dysthymic   Affect:  blunted   Language sparse   Thought Process:  concrete   Thought Content:  Negative thinking, paranoid thinking    Perceptual Disturbances: Auditory hallucinations without commands   Risk Potential: Suicidal Ideations none, Homicidal Ideations none, and  Potential for Aggression No   Sensorium:  person, place, and time/date   Cognition:  recent and remote memory grossly intact   Consciousness:  alert and awake    Attention: attention span appeared shorter than expected for age   Insight:  limited   Judgment: limited   Intellect Not assessed   Gait/Station: normal gait/station   Motor Activity: no abnormal movements     Memory: Short and long term memory  fair     Progress Toward Goals: unchanged, as evidenced by their participation (or lack thereof) in individual, social and therapeutic milieu in addition to adherence to their medication regimen.    Recommended Treatment:   See above for assessment and plan.    Inpatient Psychiatric Certification: Based upon physical, mental and social evaluations, I certify that inpatient psychiatric services are medically necessary for this patient for a duration of greater than 2 midnights for the treatment of Schizoaffective disorder, bipolar type (HCC) including psychotropic medication management, participation in the therapeutic milieu and referrals as indicated. Available alternative community resources do not meet the patient's mental health care needs. I further attest that an established written individualized plan of care has been implemented and is outlined in the patient's medical records.    Counseling/Coordination of Care    I have expended greater than 15 minutes in which more than 50% of this time was expended in counseling/coordination of patient care relating to diagnostic results, prognosis, potential risks and benefits of management options, instructions for appropriate management, patient and/or collateral education, importance of adherence to management and/or risk factor reductions. Patient's rights, confidentiality, exceptions to confidentiality, use of electronic medical record including appropriate staff access to medical record regarding behavioral health services and consent to treatment were  reviewed.    Note Share:     This note was not shared with the patient due to reasonable likelihood of causing patient harm     This note has been constructed using a voice recognition system. There may be translation, syntax,  or grammatical errors. If you have any questions, please contact the dictating provider.    Jordan Christopher Holter, DO 02/12/25

## 2025-02-12 NOTE — PLAN OF CARE
Problem: Alteration in Thoughts and Perception  Goal: Refrain from acting on delusional thinking/internal stimuli  Description: Interventions:  - Monitor patient closely, per order   - Utilize least restrictive measures   - Set reasonable limits, give positive feedback for acceptable   - Administer medications as ordered and monitor of potential side effects  Outcome: Progressing  Goal: Agree to be compliant with medication regime, as prescribed and report medication side effects  Description: Interventions:  - Offer appropriate PRN medication and supervise ingestion; conduct AIMS, as needed   Outcome: Progressing     Problem: Ineffective Coping  Goal: Identifies ineffective coping skills  Outcome: Progressing  Goal: Identifies healthy coping skills  Outcome: Progressing  Goal: Demonstrates healthy coping skills  Outcome: Progressing  Goal: Participates in unit activities  Description: Interventions:  - Provide therapeutic environment   - Provide required programming   - Redirect inappropriate behaviors   Outcome: Progressing     Problem: Depression  Goal: Verbalize thoughts and feelings  Description: Interventions:  - Assess and re-assess patient's level of risk   - Engage patient in 1:1 interactions, daily, for a minimum of 15 minutes   - Encourage patient to express feelings, fears, frustrations, hopes   Outcome: Progressing  Goal: Refrain from harming self  Description: Interventions:  - Monitor patient closely, per order   - Supervise medication ingestion, monitor effects and side effects   Outcome: Progressing  Goal: Refrain from self-neglect  Outcome: Progressing     Problem: Anxiety  Goal: Anxiety is at manageable level  Description: Interventions:  - Assess and monitor patient's anxiety level.   - Monitor for signs and symptoms (heart palpitations, chest pain, shortness of breath, headaches, nausea, feeling jumpy, restlessness, irritable, apprehensive).   - Collaborate with interdisciplinary team and  initiate plan and interventions as ordered.  - [place] patient to unit/surroundings  - Explain treatment plan  - Encourage participation in care  - Encourage verbalization of concerns/fears  - Identify coping mechanisms  - Assist in developing anxiety-reducing skills  - Administer/offer alternative therapies  - Limit or eliminate stimulants  Outcome: Progressing

## 2025-02-12 NOTE — NURSING NOTE
Patient visible this evening in the dining room with peers watching television.  Patient cooperative and behaviors were controlled and appropriate. Offered no complaints. No prn medication requested or required. Uneventful shift.

## 2025-02-12 NOTE — NURSING NOTE
"Pt remains polite, pleasant and visible in the milieu. Attends select groups and is social with select peers. Brightens on approach and continues to report anxiety, depression and AH as \" the same as they have been lately\". No new concerns otherwise.   "

## 2025-02-13 PROCEDURE — 99232 SBSQ HOSP IP/OBS MODERATE 35: CPT | Performed by: PSYCHIATRY & NEUROLOGY

## 2025-02-13 RX ORDER — VALACYCLOVIR HYDROCHLORIDE 500 MG/1
1000 TABLET, FILM COATED ORAL EVERY 12 HOURS SCHEDULED
Status: COMPLETED | OUTPATIENT
Start: 2025-02-13 | End: 2025-02-14

## 2025-02-13 RX ORDER — VALACYCLOVIR HYDROCHLORIDE 1 G/1
1000 TABLET, FILM COATED ORAL EVERY 12 HOURS SCHEDULED
Status: DISCONTINUED | OUTPATIENT
Start: 2025-02-13 | End: 2025-02-13

## 2025-02-13 RX ADMIN — DOCUSATE SODIUM 100 MG: 100 CAPSULE, LIQUID FILLED ORAL at 09:09

## 2025-02-13 RX ADMIN — SENNOSIDES AND DOCUSATE SODIUM 2 TABLET: 50; 8.6 TABLET ORAL at 21:11

## 2025-02-13 RX ADMIN — PROPRANOLOL HYDROCHLORIDE 10 MG: 10 TABLET ORAL at 21:11

## 2025-02-13 RX ADMIN — LORATADINE 10 MG: 10 TABLET ORAL at 09:09

## 2025-02-13 RX ADMIN — NICOTINE POLACRILEX 2 MG: 2 GUM, CHEWING ORAL at 12:44

## 2025-02-13 RX ADMIN — MULTIPLE VITAMINS W/ MINERALS TAB 1 TABLET: TAB ORAL at 09:09

## 2025-02-13 RX ADMIN — HALOPERIDOL 7.5 MG: 5 TABLET ORAL at 17:21

## 2025-02-13 RX ADMIN — NICOTINE POLACRILEX 2 MG: 2 GUM, CHEWING ORAL at 17:21

## 2025-02-13 RX ADMIN — ESCITALOPRAM OXALATE 20 MG: 10 TABLET, FILM COATED ORAL at 09:09

## 2025-02-13 RX ADMIN — CLOZAPINE 250 MG: 25 TABLET ORAL at 21:11

## 2025-02-13 RX ADMIN — HALOPERIDOL 7.5 MG: 5 TABLET ORAL at 09:09

## 2025-02-13 RX ADMIN — VALACYCLOVIR 1000 MG: 500 TABLET, FILM COATED ORAL at 17:21

## 2025-02-13 RX ADMIN — PANTOPRAZOLE SODIUM 20 MG: 20 TABLET, DELAYED RELEASE ORAL at 09:09

## 2025-02-13 RX ADMIN — MELATONIN TAB 3 MG 9 MG: 3 TAB at 21:11

## 2025-02-13 RX ADMIN — NICOTINE POLACRILEX 2 MG: 2 GUM, CHEWING ORAL at 09:09

## 2025-02-13 RX ADMIN — DOCUSATE SODIUM 100 MG: 100 CAPSULE, LIQUID FILLED ORAL at 17:21

## 2025-02-13 RX ADMIN — POLYETHYLENE GLYCOL 3350 17 G: 17 POWDER, FOR SOLUTION ORAL at 09:08

## 2025-02-13 RX ADMIN — NICOTINE POLACRILEX 2 MG: 2 GUM, CHEWING ORAL at 21:15

## 2025-02-13 NOTE — SOCIAL WORK
This writer met with patient for an individual session. The patient reported ongoing feelings of depression and continued experiences of AH. He expressed that his depressive symptoms are largely related to his prolonged hospital stay and his desire to be at home. Additionally, the patient shared concerns and fears about returning home while still experiencing his current symptoms.   This writer discussed and explored various coping skills the patient can utilize in the hospital to help manage his symptoms. Strategies discussed included grounding techniques, mindfulness exercises and utilizing available support resources.   This writer will encourage patient to use coping skills. This writer will follow up with ACT team regarding initiation of services.

## 2025-02-13 NOTE — NURSING NOTE
Patient pleasant, calm and cooperative, able to make needs known. Is visible on the module, social with peers, attends groups. Denies SI/HI/VH. Reports anxiety, depression and AH not requesting or requiring PRN intervention. Medication compliant. Will continue to monitor.

## 2025-02-13 NOTE — PROGRESS NOTES
02/13/25 0756   Team Meeting   Meeting Type Daily Rounds   Team Members Present   Team Members Present Physician;Nurse;;Other (Discipline and Name)   Physician Team Member Holter, Sharma   Nursing Team Member Claudia   Social Work Team Member Jose J ORTEGA   Other (Discipline and Name) Wang PCM   Patient/Family Present   Patient Present No   Patient's Family Present No     Groups Participation  7/10.   Patient's compliant with medications. He's engaged in his treatment. He socializes with select peers. Haldol increased to 12.5mg.

## 2025-02-13 NOTE — PROGRESS NOTES
"    Psychiatric Progress Note - Department of Behavioral Health   Alberto Berumen 29 y.o. male MRN: [id number]  Unit/Bed#: [facility] 112-02 Encounter: [id number]    ASSESSMENT & PLAN     Assessment & Plan  Schizoaffective disorder, bipolar type (HCC)    Patient continues to endorse AH that threaten they are going to hurt him/family which has been ongoing. Patient reported to nursing this morning that his AH, depression, and anxiety have been \"really heavy.\" He continues to report increased fatigue and remains self isolative in his room.       Continue medication as follows:  Continue Clozapine 250 mg QHS for psychosis (previously increased 12/11/24)  02/04: , BNP 10, CRP 9.5  02/04: ANC 4.42  To consider further careful titration  CBC w/diff and CK every 2 weeks  Continue upward titration of haldol to 5 to 7.5 mg BID for psychosis   Continue Lexapro 20 mg daily for depression and anxiety  Continue Propanolol 10 mg BID for anxiety   Continue Melatonin 9 mg QHS for sleep  Continue Colace and Senna-Docusate sodium 50 mg tablet QHS for constipation  Continue Zofran 4 mg tablet Q6H PRN for nausea   Continue PRN mouth kote for xerostomia     - Pt remains on dual antipsychotic Tx due to failure on monotherapy   .  - S/p ECT treatments.  - ACT team referral was made for him living back with his aunt once MA funding comes through from the [facility] and sister will try to reapply  No associated orders from this encounter found during lookback period of 72 hours.    Chronic idiopathic constipation    Follows with medical team  Continue senna-docusate sodium dose per medical team.   Continue with regular bowel movements, refused miralax this AM.  No associated orders from this encounter found during lookback period of 72 hours.  GERD (gastroesophageal reflux disease)    Follows with medical  Continue famotidine 20 mg tablet BID per medical team   Continue pantoprazole EC 20 mg tablet every morning per medical " team  This is a chronic condition, and is stable unless otherwise noted.  No associated orders from this encounter found during lookback period of 72 hours.  Tobacco abuse    On nicotine gum as needed and encouraged smoking cessation  Continue to encourage cessation upon discharge  No associated orders from this encounter found during lookback period of 72 hours.  Elevated hemoglobin A1c    Follows with medical team  No associated orders from this encounter found during lookback period of 72 hours.  Class 2 obesity in adult  Reviewed  2/10/25  Follows with medical team and given dietary counseling and need for exercise  No associated orders from this encounter found during lookback period of 72 hours.    Primary hypertension    Follows with medical team on hydrochlorothiazide and Norvasc  Blood pressure remains stable at this time.  No associated orders from this encounter found during lookback period of 72 hours.    Treatment Recommendations/Precautions:  Continue to promote patient participation in therapeutic milieu.  Continue medical management per medicine.  Continue previously prescribed psychotropic medication regimen; see below.  Continue behavioral health checks q.15 minutes.   Continue vitals per behavioral health unit protocol.  Discharge date per primary team    SUBJECTIVE     Patient evaluated this a.m. for continuity of care. Patient was discussed at length with nursing and treatment team. Per nursing, patient remains calm, cooperative, intermittently interactive in the milieu, adherent to his medications without any acute adverse effects. No acute distress is noted throughout evaluation. Alberto Berumen denies suicidal/homicidal ideation in addition to thoughts of self-injury, receptive to crisis planning provided by this writer, contacting for safety in the inpatient setting, admitting to an ability to appropriately confide in staff including this writer.  Patient admits to dysphoric mood including  depression and anxiety in addition to intermittent instances of auditory hallucinations that are negative and derogatory content, denying any additional psychiatric complaint/concerns    PSYCHIATRIC REVIEW OF SYSTEMS     Behavior over the last 24 hours:  unchanged  Sleep: adequate  Appetite: adequate  Medication side effects: none    REVIEW OF SYSTEMS   Review of systems: no complaints    OBJECTIVE     Vital Signs in Past 24 Hours:  Temp:  [98.2 °F (36.8 °C)] 98.2 °F (36.8 °C)  HR:  [68-89] 68  BP: (109-142)/(52-73) 109/52  Resp:  [17] 17    Intake/Output in Past 24 hours:  No intake/output data recorded.  No intake/output data recorded.        Laboratory Results:  I have personally reviewed all pertinent laboratory/tests results.  Most Recent Labs:   Lab Results   Component Value Date    WBC 9.74 02/04/2025    RBC 5.54 02/04/2025    HGB 12.9 02/04/2025    HCT 43.5 02/04/2025     02/04/2025    RDW 14.1 02/04/2025    NEUTROABS 4.42 02/04/2025    SODIUM 140 10/16/2024    K 3.8 10/16/2024     10/16/2024    CO2 29 10/16/2024    BUN 9 10/16/2024    CREATININE 0.91 10/16/2024    GLUC 94 10/16/2024    GLUF 94 10/16/2024    CALCIUM 9.5 10/16/2024    AST 26 09/30/2024    ALT 42 09/30/2024    ALKPHOS 64 09/30/2024    TP 6.6 09/30/2024    ALB 3.8 09/30/2024    TBILI 0.47 09/30/2024    CHOLESTEROL 156 03/14/2024    HDL 44 03/14/2024    TRIG 133 03/14/2024    LDLCALC 85 03/14/2024    NONHDLC 112 03/14/2024    LITHIUM 0.61 01/09/2024    [id number] 1.062 11/15/2023    HGBA1C 5.0 04/01/2024    EAG 97 04/01/2024       Behavioral Health Medications: all current active meds have been reviewed and current meds:   Current Facility-Administered Medications:     acetaminophen (TYLENOL) tablet 650 mg, Q4H PRN    acetaminophen (TYLENOL) tablet 650 mg, Q6H PRN    aluminum-magnesium hydroxide-simethicone (MAALOX) oral suspension 30 mL, Q4H PRN    amLODIPine (NORVASC) tablet 5 mg, Daily    Artificial Tears ophthalmic solution 1  drop, Q3H PRN    haloperidol lactate (HALDOL) injection 2.5 mg, Q4H PRN Max 4/day **AND** LORazepam (ATIVAN) injection 1 mg, Q4H PRN Max 4/day **AND** benztropine (COGENTIN) injection 0.5 mg, Q4H PRN Max 4/day    haloperidol lactate (HALDOL) injection 5 mg, Q4H PRN Max 4/day **AND** LORazepam (ATIVAN) injection 2 mg, Q4H PRN Max 4/day **AND** benztropine (COGENTIN) injection 1 mg, Q4H PRN Max 4/day    bisacodyl (DULCOLAX) rectal suppository 10 mg, Daily PRN    calcium carbonate (TUMS) chewable tablet 500 mg, BID PRN    cloZAPine (CLOZARIL) tablet 250 mg, HS    hydrOXYzine HCL (ATARAX) tablet 50 mg, Q6H PRN Max 4/day **OR** diphenhydrAMINE (BENADRYL) injection 50 mg, Q6H PRN    hydrOXYzine HCL (ATARAX) tablet 50 mg, Q6H PRN Max 4/day **OR** diphenhydrAMINE (BENADRYL) injection 50 mg, Q6H PRN    diphenhydrAMINE-zinc acetate (BENADRYL) 2-0.1 % cream, BID PRN    docusate sodium (COLACE) capsule 100 mg, BID    escitalopram (LEXAPRO) tablet 20 mg, Daily    famotidine (PEPCID) tablet 20 mg, BID PRN    fluticasone (FLONASE) 50 mcg/act nasal spray 1 spray, Daily PRN    haloperidol (HALDOL) tablet 1 mg, Q6H PRN    haloperidol (HALDOL) tablet 2.5 mg, Q4H PRN Max 4/day    haloperidol (HALDOL) tablet 5 mg, Q4H PRN Max 4/day    haloperidol (HALDOL) tablet 7.5 mg, BID    hydroCHLOROthiazide tablet 12.5 mg, Daily    hydrocortisone 1 % ointment, 4x Daily PRN    hydrOXYzine HCL (ATARAX) tablet 100 mg, Q6H PRN Max 4/day **OR** LORazepam (ATIVAN) injection 2 mg, Q6H PRN    hydrOXYzine HCL (ATARAX) tablet 100 mg, Q6H PRN Max 4/day **OR** LORazepam (ATIVAN) injection 2 mg, Q6H PRN    hydrOXYzine HCL (ATARAX) tablet 25 mg, Q6H PRN Max 4/day    ibuprofen (MOTRIN) tablet 600 mg, Q8H PRN    influenza vaccine (PF) (Fluarix) IM injection 0.5 mL, Once    loratadine (CLARITIN) tablet 10 mg, Daily    magnesium hydroxide (MILK OF MAGNESIA) oral suspension 30 mL, Once    melatonin tablet 3 mg, HS PRN    melatonin tablet 9 mg, HS    methocarbamol  (ROBAXIN) tablet 500 mg, Q6H PRN    multivitamin-minerals (CENTRUM) tablet 1 tablet, Daily    nicotine polacrilex (NICORETTE) gum 2 mg, Q4H PRN    OLANZapine (ZyPREXA) tablet 5 mg, Q4H PRN Max 3/day **OR** OLANZapine (ZyPREXA) IM injection 2.5 mg, Q4H PRN Max 3/day    OLANZapine (ZyPREXA) tablet 5 mg, Q3H PRN Max 3/day **OR** OLANZapine (ZyPREXA) IM injection 5 mg, Q3H PRN Max 3/day    OLANZapine (ZyPREXA) tablet 2.5 mg, Q4H PRN Max 6/day    ondansetron (ZOFRAN-ODT) dispersible tablet 4 mg, Q6H PRN    pantoprazole (PROTONIX) EC tablet 20 mg, QAM    polyethylene glycol (MIRALAX) packet 17 g, Daily PRN    polyethylene glycol (MIRALAX) packet 17 g, Daily    propranolol (INDERAL) tablet 10 mg, Q12H KAYLIE    saliva substitute (MOUTH KOTE) mucosal solution 5 spray, 4x Daily PRN    senna-docusate sodium (SENOKOT S) 8.6-50 mg per tablet 1 tablet, Daily PRN    senna-docusate sodium (SENOKOT S) 8.6-50 mg per tablet 2 tablet, HS    traZODone (DESYREL) tablet 50 mg, HS PRN    white petrolatum-mineral oil (EUCERIN,HYDROCERIN) cream, TID PRN.    Risks, benefits and possible side effects of Medications:   Risks, benefits, and possible side effects of medications explained to patient and patient verbalizes understanding and agreement for treatment.    Mental Status Evaluation:  Appearance:  age appropriate, bearded, casually dressed, and disheveled   Behavior:  psychomotor retardation   Speech:  soft and scant   Mood:  anxious, depressed, and dysthymic   Affect:  blunted and mood-incongruent   Language sparse   Thought Process:  concrete   Thought Content:  Negative thinking appearing somewhat paranoid   Perceptual Disturbances: Auditory hallucinations without commands   Risk Potential: Suicidal Ideations none, Homicidal Ideations none, and Potential for Aggression No   Sensorium:  person, place, and time/date   Cognition:  recent and remote memory grossly intact   Consciousness:  alert and awake    Attention: attention span appeared  shorter than expected for age   Insight:  limited   Judgment: limited   Intellect Not assessed   Gait/Station: normal gait/station and normal balance   Motor Activity: no abnormal movements     Memory: Short and long term memory  fair     Progress Toward Goals: unchanged, as evidenced by their participation (or lack thereof) in individual, social and therapeutic milieu in addition to adherence to their medication regimen.    Recommended Treatment:   See above for assessment and plan.    Inpatient Psychiatric Certification: Based upon physical, mental and social evaluations, I certify that inpatient psychiatric services are medically necessary for this patient for a duration of greater than 2 midnights for the treatment of Schizoaffective disorder, bipolar type (HCC) including psychotropic medication management, participation in the therapeutic milieu and referrals as indicated. Available alternative community resources do not meet the patient's mental health care needs. I further attest that an established written individualized plan of care has been implemented and is outlined in the patient's medical records.    Counseling/Coordination of Care    I have expended greater than 15 minutes in which more than 50% of this time was expended in counseling/coordination of patient care relating to diagnostic results, prognosis, potential risks and benefits of management options, instructions for appropriate management, patient and/or collateral education, importance of adherence to management and/or risk factor reductions. Patient's rights, confidentiality, exceptions to confidentiality, use of electronic medical record including appropriate staff access to medical record regarding behavioral health services and consent to treatment were reviewed.    Note Share:     This note was not shared with the patient due to reasonable likelihood of causing patient harm     This note has been constructed using a voice recognition  system. There may be translation, syntax,  or grammatical errors. If you have any questions, please contact the dictating provider.    Jordan Christopher Holter, DO 02/13/25

## 2025-02-13 NOTE — QUICK NOTE
Notified by nursing regarding complaints complaint of a fever blister on lip. Picture to be placed in chart. Will add Valtrex 1 gm BID for 2 doses as patient has history of HSV-1. Can use ice and moisturizer as needed for comfort.    Yes

## 2025-02-13 NOTE — NURSING NOTE
"Pt is visible in the milieu and social with select peers. He consumed 100 % of dinner. Took his medications without incidence. Pt is polite, pleasant, and cooperative. Brightens on approach.  Pt endorses AH, anxiety, and depression. Denies SI/HI/VH. Pt said \"I'm stable.\" Attended 7/10 groups. Offered no complaints. No behavioral issues.   "

## 2025-02-13 NOTE — PLAN OF CARE
Problem: Alteration in Thoughts and Perception  Goal: Treatment Goal: Gain control of psychotic behaviors/thinking, reduce/eliminate presenting symptoms and demonstrate improved reality functioning upon discharge  Outcome: Progressing  Goal: Refrain from acting on delusional thinking/internal stimuli  Description: Interventions:  - Monitor patient closely, per order   - Utilize least restrictive measures   - Set reasonable limits, give positive feedback for acceptable   - Administer medications as ordered and monitor of potential side effects  Outcome: Progressing  Goal: Agree to be compliant with medication regime, as prescribed and report medication side effects  Description: Interventions:  - Offer appropriate PRN medication and supervise ingestion; conduct AIMS, as needed   Outcome: Progressing  Goal: Recognize dysfunctional thoughts, communicate reality-based thoughts at the time of discharge  Description: Interventions:  - Provide medication and psycho-education to assist patient in compliance and developing insight into his/her illness   Outcome: Progressing     Problem: Ineffective Coping  Goal: Participates in unit activities  Description: Interventions:  - Provide therapeutic environment   - Provide required programming   - Redirect inappropriate behaviors   Outcome: Progressing  Goal: Patient/Family participate in treatment and DC plans  Description: Interventions:  - Provide therapeutic environment  Outcome: Progressing  Goal: Patient/Family verbalizes awareness of resources  Outcome: Progressing     Problem: Depression  Goal: Treatment Goal: Demonstrate behavioral control of depressive symptoms, verbalize feelings of improved mood/affect, and adopt new coping skills prior to discharge  Outcome: Progressing  Goal: Verbalize thoughts and feelings  Description: Interventions:  - Assess and re-assess patient's level of risk   - Engage patient in 1:1 interactions, daily, for a minimum of 15 minutes   -  Encourage patient to express feelings, fears, frustrations, hopes   Outcome: Progressing  Goal: Refrain from harming self  Description: Interventions:  - Monitor patient closely, per order   - Supervise medication ingestion, monitor effects and side effects   Outcome: Progressing  Goal: Refrain from isolation  Description: Interventions:  - Develop a trusting relationship   - Encourage socialization   Outcome: Progressing  Goal: Refrain from self-neglect  Outcome: Progressing     Problem: Anxiety  Goal: Anxiety is at manageable level  Description: Interventions:  - Assess and monitor patient's anxiety level.   - Monitor for signs and symptoms (heart palpitations, chest pain, shortness of breath, headaches, nausea, feeling jumpy, restlessness, irritable, apprehensive).   - Collaborate with interdisciplinary team and initiate plan and interventions as ordered.  - [place] patient to unit/surroundings  - Explain treatment plan  - Encourage participation in care  - Encourage verbalization of concerns/fears  - Identify coping mechanisms  - Assist in developing anxiety-reducing skills  - Administer/offer alternative therapies  - Limit or eliminate stimulants  Outcome: Progressing     Problem: Alteration in Orientation  Goal: Treatment Goal: Demonstrate a reduction of confusion and improved orientation to person, place, time and/or situation upon discharge, according to optimum baseline/ability  Outcome: Progressing  Goal: Interact with staff daily  Description: Interventions:  - Assess and re-assess patient's level of orientation  - Engage patient in 1 on 1 interactions, daily, for a minimum of 15 minutes   - Establish rapport/trust with patient   Outcome: Progressing  Goal: Allow medical examinations, as recommended  Description: Interventions:  - Provide physical/neurological exams and/or referrals, per provider   Outcome: Progressing  Goal: Complete daily ADLs, including personal hygiene independently, as  able  Description: Interventions:  - Observe, teach, and assist patient with ADLS  Outcome: Progressing     Problem: DISCHARGE PLANNING  Goal: Discharge to home with appropriate resources  Description: INTERVENTIONS:  - Identify barriers to discharge w/patient and caregiver  - Arrange for needed discharge resources and transportation as appropriate  - Identify discharge learning needs (meds, wound care, etc.)  - Refer to Case Management Department for coordinating discharge planning if the patient needs post-hospital services based on physician/advanced practitioner order or complex needs related to functional status, cognitive ability, or social support system  Outcome: Progressing

## 2025-02-13 NOTE — NURSING NOTE
Received pt in bed at change of shift with eyes closed; chest movement noted.  Continues the same thus this far as per q 15 min room checks.   Will continue to monitor behavior, sleeping pattern and any medical issues that may arise.    0536:  Sleeping 7+ hrs thus this far

## 2025-02-13 NOTE — ASSESSMENT & PLAN NOTE
"  Patient continues to endorse AH that threaten they are going to hurt him/family which has been ongoing. Patient reported to nursing this morning that his AH, depression, and anxiety have been \"really heavy.\" He continues to report increased fatigue and remains self isolative in his room.       Continue medication as follows:  Continue Clozapine 250 mg QHS for psychosis (previously increased 12/11/24)  02/04: , BNP 10, CRP 9.5  02/04: ANC 4.42  To consider further careful titration  CBC w/diff and CK every 2 weeks  Continue upward titration of haldol to 5 to 7.5 mg BID for psychosis   Continue Lexapro 20 mg daily for depression and anxiety  Continue Propanolol 10 mg BID for anxiety   Continue Melatonin 9 mg QHS for sleep  Continue Colace and Senna-Docusate sodium 50 mg tablet QHS for constipation  Continue Zofran 4 mg tablet Q6H PRN for nausea   Continue PRN mouth kote for xerostomia     - Pt remains on dual antipsychotic Tx due to failure on monotherapy   .  - S/p ECT treatments.  - ACT team referral was made for him living back with his aunt once MA funding comes through from the [facility] and sister will try to reapply  No associated orders from this encounter found during lookback period of 72 hours.    "

## 2025-02-14 PROCEDURE — 99232 SBSQ HOSP IP/OBS MODERATE 35: CPT | Performed by: PSYCHIATRY & NEUROLOGY

## 2025-02-14 RX ADMIN — LORATADINE 10 MG: 10 TABLET ORAL at 09:09

## 2025-02-14 RX ADMIN — ESCITALOPRAM OXALATE 20 MG: 10 TABLET, FILM COATED ORAL at 09:09

## 2025-02-14 RX ADMIN — VALACYCLOVIR 1000 MG: 500 TABLET, FILM COATED ORAL at 09:10

## 2025-02-14 RX ADMIN — NICOTINE POLACRILEX 2 MG: 2 GUM, CHEWING ORAL at 17:08

## 2025-02-14 RX ADMIN — DOCUSATE SODIUM 100 MG: 100 CAPSULE, LIQUID FILLED ORAL at 09:12

## 2025-02-14 RX ADMIN — HALOPERIDOL 7.5 MG: 5 TABLET ORAL at 09:08

## 2025-02-14 RX ADMIN — CLOZAPINE 250 MG: 25 TABLET ORAL at 21:14

## 2025-02-14 RX ADMIN — MULTIPLE VITAMINS W/ MINERALS TAB 1 TABLET: TAB ORAL at 09:10

## 2025-02-14 RX ADMIN — PROPRANOLOL HYDROCHLORIDE 10 MG: 10 TABLET ORAL at 21:14

## 2025-02-14 RX ADMIN — NICOTINE POLACRILEX 2 MG: 2 GUM, CHEWING ORAL at 21:14

## 2025-02-14 RX ADMIN — SENNOSIDES AND DOCUSATE SODIUM 2 TABLET: 50; 8.6 TABLET ORAL at 21:14

## 2025-02-14 RX ADMIN — HALOPERIDOL 7.5 MG: 5 TABLET ORAL at 17:08

## 2025-02-14 RX ADMIN — MELATONIN TAB 3 MG 9 MG: 3 TAB at 21:14

## 2025-02-14 RX ADMIN — PANTOPRAZOLE SODIUM 20 MG: 20 TABLET, DELAYED RELEASE ORAL at 09:09

## 2025-02-14 RX ADMIN — NICOTINE POLACRILEX 2 MG: 2 GUM, CHEWING ORAL at 12:53

## 2025-02-14 RX ADMIN — DOCUSATE SODIUM 100 MG: 100 CAPSULE, LIQUID FILLED ORAL at 17:08

## 2025-02-14 NOTE — NURSING NOTE
Pt is visible in the milieu and social with select peers. He consumed 100 % of dinner. Took his medications without incidence. Pt is polite, pleasant, and cooperative. Brightens on approach. Pt endorses AH of voices wanting to hurt him and his family as well as depression and anxiety. Denies VH/SI/HI. Attended 8/10 groups. No unmet needs. No behavioral issues.

## 2025-02-14 NOTE — SOCIAL WORK
This writer met with patient for a patient check in. Patient reports he's doing well and felt his mood had improved. This writer discussed application for Medical Assistance which patient is in agreement with.  This writer emailed [facility] and [facility] CM requesting a status update on patient's referral for [facility] services.

## 2025-02-14 NOTE — ASSESSMENT & PLAN NOTE
"  Patient continues to endorse AH that threaten they are going to hurt him/family which has been ongoing. Patient reported to nursing this morning that his AH, depression, and anxiety have been \"really heavy.\" He continues to report increased fatigue and remains self isolative in his room.       Continue medication as follows:  Continue Clozapine 250 mg QHS for psychosis (previously increased 12/11/24)  02/04: , BNP 10, CRP 9.5  02/04: ANC 4.42  To consider further careful titration  CBC w/diff and CK every 2 weeks  Continue upward titration of haldol to 5 to 7.5 mg BID for psychosis   Continue Lexapro 20 mg daily for depression and anxiety  Continue Propanolol 10 mg BID for anxiety   Continue Melatonin 9 mg QHS for sleep  Continue Colace and Senna-Docusate sodium 50 mg tablet QHS for constipation  Continue Zofran 4 mg tablet Q6H PRN for nausea   Continue PRN mouth kote for xerostomia     - Pt remains on dual antipsychotic Tx due to failure on monotherapy   .  - S/p ECT treatments.  - ACT team referral was made for him living back with his aunt once MA funding comes through from the [facility] and sister will try to reapply  No associated orders from this encounter found during lookback period of 72 hours.    "

## 2025-02-14 NOTE — NURSING NOTE
Patient is visible, attending groups on unit and pacing laps at times with peers. Patient reports no s/s, no distress noted. Pt takes all medications without issue.

## 2025-02-14 NOTE — PROGRESS NOTES
02/14/25 0806   Team Meeting   Meeting Type Daily Rounds   Team Members Present   Team Members Present Physician;Nurse;;Other (Discipline and Name)   Physician Team Member Victor Manuel   Nursing Team Member Claudia   Social Work Team Member Jose J ORTEGA   Other (Discipline and Name) Wang PCM   Patient/Family Present   Patient Present No   Patient's Family Present No     Groups Participation  8/10.   Patient's compliant with medications. He had improved engagement in his treatment. He's visible and pleasant. Social with select peers.

## 2025-02-14 NOTE — NURSING NOTE
Pt is visible on the unit and social with select peers. Consumed 100% of dinner. Took medications without incidence. Pt is pleasant and cooperative. Attended 6/10 groups. Reports anxiety, depression, and AHs that threaten to hurt him and his family. Denies SI/HI/VH. No behavioral issues. Pt offers no new complaints.

## 2025-02-14 NOTE — PLAN OF CARE
Problem: Alteration in Thoughts and Perception  Goal: Treatment Goal: Gain control of psychotic behaviors/thinking, reduce/eliminate presenting symptoms and demonstrate improved reality functioning upon discharge  Outcome: Progressing  Goal: Refrain from acting on delusional thinking/internal stimuli  Description: Interventions:  - Monitor patient closely, per order   - Utilize least restrictive measures   - Set reasonable limits, give positive feedback for acceptable   - Administer medications as ordered and monitor of potential side effects  Outcome: Progressing  Goal: Agree to be compliant with medication regime, as prescribed and report medication side effects  Description: Interventions:  - Offer appropriate PRN medication and supervise ingestion; conduct AIMS, as needed   Outcome: Progressing  Goal: Recognize dysfunctional thoughts, communicate reality-based thoughts at the time of discharge  Description: Interventions:  - Provide medication and psycho-education to assist patient in compliance and developing insight into his/her illness   Outcome: Progressing     Problem: Ineffective Coping  Goal: Identifies ineffective coping skills  Outcome: Progressing  Goal: Identifies healthy coping skills  Outcome: Progressing  Goal: Demonstrates healthy coping skills  Outcome: Progressing  Goal: Participates in unit activities  Description: Interventions:  - Provide therapeutic environment   - Provide required programming   - Redirect inappropriate behaviors   Outcome: Progressing     Problem: Depression  Goal: Treatment Goal: Demonstrate behavioral control of depressive symptoms, verbalize feelings of improved mood/affect, and adopt new coping skills prior to discharge  Outcome: Progressing  Goal: Refrain from isolation  Description: Interventions:  - Develop a trusting relationship   - Encourage socialization   Outcome: Progressing     Problem: Anxiety  Goal: Anxiety is at manageable level  Description:  Interventions:  - Assess and monitor patient's anxiety level.   - Monitor for signs and symptoms (heart palpitations, chest pain, shortness of breath, headaches, nausea, feeling jumpy, restlessness, irritable, apprehensive).   - Collaborate with interdisciplinary team and initiate plan and interventions as ordered.  - [place] patient to unit/surroundings  - Explain treatment plan  - Encourage participation in care  - Encourage verbalization of concerns/fears  - Identify coping mechanisms  - Assist in developing anxiety-reducing skills  - Administer/offer alternative therapies  - Limit or eliminate stimulants  Outcome: Progressing     Problem: Alteration in Orientation  Goal: Interact with staff daily  Description: Interventions:  - Assess and re-assess patient's level of orientation  - Engage patient in 1 on 1 interactions, daily, for a minimum of 15 minutes   - Establish rapport/trust with patient   Outcome: Progressing  Goal: Complete daily ADLs, including personal hygiene independently, as able  Description: Interventions:  - Observe, teach, and assist patient with ADLS  Outcome: Progressing

## 2025-02-14 NOTE — PROGRESS NOTES
"    Psychiatric Progress Note - Department of Behavioral Health   Alberto Berumen 29 y.o. male MRN: [id number]  Unit/Bed#: [facility] 112-02 Encounter: [id number]    ASSESSMENT & PLAN     Assessment & Plan  Schizoaffective disorder, bipolar type (HCC)    Patient continues to endorse AH that threaten they are going to hurt him/family which has been ongoing. Patient reported to nursing this morning that his AH, depression, and anxiety have been \"really heavy.\" He continues to report increased fatigue and remains self isolative in his room.       Continue medication as follows:  Continue Clozapine 250 mg QHS for psychosis (previously increased 12/11/24)  02/04: , BNP 10, CRP 9.5  02/04: ANC 4.42  To consider further careful titration  CBC w/diff and CK every 2 weeks  Continue upward titration of haldol to 5 to 7.5 mg BID for psychosis   Continue Lexapro 20 mg daily for depression and anxiety  Continue Propanolol 10 mg BID for anxiety   Continue Melatonin 9 mg QHS for sleep  Continue Colace and Senna-Docusate sodium 50 mg tablet QHS for constipation  Continue Zofran 4 mg tablet Q6H PRN for nausea   Continue PRN mouth kote for xerostomia     - Pt remains on dual antipsychotic Tx due to failure on monotherapy   .  - S/p ECT treatments.  - ACT team referral was made for him living back with his aunt once MA funding comes through from the [facility] and sister will try to reapply  No associated orders from this encounter found during lookback period of 72 hours.    Chronic idiopathic constipation    Follows with medical team  Continue senna-docusate sodium dose per medical team.   Continue with regular bowel movements, refused miralax this AM.  No associated orders from this encounter found during lookback period of 72 hours.  GERD (gastroesophageal reflux disease)    Follows with medical  Continue famotidine 20 mg tablet BID per medical team   Continue pantoprazole EC 20 mg tablet every morning per medical " team  This is a chronic condition, and is stable unless otherwise noted.  No associated orders from this encounter found during lookback period of 72 hours.  Tobacco abuse    On nicotine gum as needed and encouraged smoking cessation  Continue to encourage cessation upon discharge  No associated orders from this encounter found during lookback period of 72 hours.  Elevated hemoglobin A1c    Follows with medical team  No associated orders from this encounter found during lookback period of 72 hours.  Class 2 obesity in adult  Reviewed  2/10/25  Follows with medical team and given dietary counseling and need for exercise  No associated orders from this encounter found during lookback period of 72 hours.    Primary hypertension    Follows with medical team on hydrochlorothiazide and Norvasc  Blood pressure remains stable at this time.  No associated orders from this encounter found during lookback period of 72 hours.    Treatment Recommendations/Precautions:  Continue to promote patient participation in therapeutic milieu.  Continue medical management per medicine.  Continue previously prescribed psychotropic medication regimen; see below.  Continue behavioral health checks q.15 minutes.   Continue vitals per behavioral health unit protocol.  Discharge date per primary team    SUBJECTIVE     Patient evaluated this a.m. for continuity of care. Patient was discussed at length with nursing and treatment team. Per nursing, patient remains calm, cooperative, present in the milieu, adherent to his medications less any acute adverse effects. No acute distress is noted throughout evaluation. Alberto Berumen denies suicidal/homicidal ideation in addition to thoughts of self-injury, receptive to crisis planning provided by this writer, contacting for safety in the inpatient setting, admitting to an ability to appropriately confide in staff including this writer. Patient admits to depressed and anxious mood accompanied by  negative auditory hallucinations that state that his relatives will die, denying any additional psychiatric complaints/concerns, awaiting ACT team linkage to assure appropriate outpatient psychiatric management    PSYCHIATRIC REVIEW OF SYSTEMS     Behavior over the last 24 hours:  unchanged  Sleep: adequate  Appetite: adequate  Medication side effects: none     REVIEW OF SYSTEMS   Review of systems: no complaints    OBJECTIVE     Vital Signs in Past 24 Hours:  Temp:  [96.9 °F (36.1 °C)-97.3 °F (36.3 °C)] 97.3 °F (36.3 °C)  HR:  [82-95] 85  BP: (115-149)/(72-81) 115/72  Resp:  [16-18] 18  SpO2:  [98 %] 98 %  O2 Device: None (Room air)    Intake/Output in Past 24 hours:  No intake/output data recorded.  No intake/output data recorded.        Laboratory Results:  I have personally reviewed all pertinent laboratory/tests results.  Most Recent Labs:   Lab Results   Component Value Date    WBC 9.74 02/04/2025    RBC 5.54 02/04/2025    HGB 12.9 02/04/2025    HCT 43.5 02/04/2025     02/04/2025    RDW 14.1 02/04/2025    NEUTROABS 4.42 02/04/2025    SODIUM 140 10/16/2024    K 3.8 10/16/2024     10/16/2024    CO2 29 10/16/2024    BUN 9 10/16/2024    CREATININE 0.91 10/16/2024    GLUC 94 10/16/2024    GLUF 94 10/16/2024    CALCIUM 9.5 10/16/2024    AST 26 09/30/2024    ALT 42 09/30/2024    ALKPHOS 64 09/30/2024    TP 6.6 09/30/2024    ALB 3.8 09/30/2024    TBILI 0.47 09/30/2024    CHOLESTEROL 156 03/14/2024    HDL 44 03/14/2024    TRIG 133 03/14/2024    LDLCALC 85 03/14/2024    NONHDLC 112 03/14/2024    LITHIUM 0.61 01/09/2024    [id number] 1.062 11/15/2023    HGBA1C 5.0 04/01/2024    EAG 97 04/01/2024       Behavioral Health Medications: all current active meds have been reviewed and current meds:   Current Facility-Administered Medications:     acetaminophen (TYLENOL) tablet 650 mg, Q4H PRN    acetaminophen (TYLENOL) tablet 650 mg, Q6H PRN    aluminum-magnesium hydroxide-simethicone (MAALOX) oral suspension 30  mL, Q4H PRN    amLODIPine (NORVASC) tablet 5 mg, Daily    Artificial Tears ophthalmic solution 1 drop, Q3H PRN    haloperidol lactate (HALDOL) injection 2.5 mg, Q4H PRN Max 4/day **AND** LORazepam (ATIVAN) injection 1 mg, Q4H PRN Max 4/day **AND** benztropine (COGENTIN) injection 0.5 mg, Q4H PRN Max 4/day    haloperidol lactate (HALDOL) injection 5 mg, Q4H PRN Max 4/day **AND** LORazepam (ATIVAN) injection 2 mg, Q4H PRN Max 4/day **AND** benztropine (COGENTIN) injection 1 mg, Q4H PRN Max 4/day    bisacodyl (DULCOLAX) rectal suppository 10 mg, Daily PRN    calcium carbonate (TUMS) chewable tablet 500 mg, BID PRN    cloZAPine (CLOZARIL) tablet 250 mg, HS    hydrOXYzine HCL (ATARAX) tablet 50 mg, Q6H PRN Max 4/day **OR** diphenhydrAMINE (BENADRYL) injection 50 mg, Q6H PRN    hydrOXYzine HCL (ATARAX) tablet 50 mg, Q6H PRN Max 4/day **OR** diphenhydrAMINE (BENADRYL) injection 50 mg, Q6H PRN    diphenhydrAMINE-zinc acetate (BENADRYL) 2-0.1 % cream, BID PRN    docusate sodium (COLACE) capsule 100 mg, BID    escitalopram (LEXAPRO) tablet 20 mg, Daily    famotidine (PEPCID) tablet 20 mg, BID PRN    fluticasone (FLONASE) 50 mcg/act nasal spray 1 spray, Daily PRN    haloperidol (HALDOL) tablet 1 mg, Q6H PRN    haloperidol (HALDOL) tablet 2.5 mg, Q4H PRN Max 4/day    haloperidol (HALDOL) tablet 5 mg, Q4H PRN Max 4/day    haloperidol (HALDOL) tablet 7.5 mg, BID    hydroCHLOROthiazide tablet 12.5 mg, Daily    hydrocortisone 1 % ointment, 4x Daily PRN    hydrOXYzine HCL (ATARAX) tablet 100 mg, Q6H PRN Max 4/day **OR** LORazepam (ATIVAN) injection 2 mg, Q6H PRN    hydrOXYzine HCL (ATARAX) tablet 100 mg, Q6H PRN Max 4/day **OR** LORazepam (ATIVAN) injection 2 mg, Q6H PRN    hydrOXYzine HCL (ATARAX) tablet 25 mg, Q6H PRN Max 4/day    ibuprofen (MOTRIN) tablet 600 mg, Q8H PRN    influenza vaccine (PF) (Fluarix) IM injection 0.5 mL, Once    loratadine (CLARITIN) tablet 10 mg, Daily    magnesium hydroxide (MILK OF MAGNESIA) oral  suspension 30 mL, Once    melatonin tablet 3 mg, HS PRN    melatonin tablet 9 mg, HS    methocarbamol (ROBAXIN) tablet 500 mg, Q6H PRN    multivitamin-minerals (CENTRUM) tablet 1 tablet, Daily    nicotine polacrilex (NICORETTE) gum 2 mg, Q4H PRN    OLANZapine (ZyPREXA) tablet 5 mg, Q4H PRN Max 3/day **OR** OLANZapine (ZyPREXA) IM injection 2.5 mg, Q4H PRN Max 3/day    OLANZapine (ZyPREXA) tablet 5 mg, Q3H PRN Max 3/day **OR** OLANZapine (ZyPREXA) IM injection 5 mg, Q3H PRN Max 3/day    OLANZapine (ZyPREXA) tablet 2.5 mg, Q4H PRN Max 6/day    ondansetron (ZOFRAN-ODT) dispersible tablet 4 mg, Q6H PRN    pantoprazole (PROTONIX) EC tablet 20 mg, QAM    polyethylene glycol (MIRALAX) packet 17 g, Daily PRN    polyethylene glycol (MIRALAX) packet 17 g, Daily    propranolol (INDERAL) tablet 10 mg, Q12H KAYLIE    saliva substitute (MOUTH KOTE) mucosal solution 5 spray, 4x Daily PRN    senna-docusate sodium (SENOKOT S) 8.6-50 mg per tablet 1 tablet, Daily PRN    senna-docusate sodium (SENOKOT S) 8.6-50 mg per tablet 2 tablet, HS    traZODone (DESYREL) tablet 50 mg, HS PRN    white petrolatum-mineral oil (EUCERIN,HYDROCERIN) cream, TID PRN.    Risks, benefits and possible side effects of Medications:   Risks, benefits, and possible side effects of medications explained to patient and patient verbalizes understanding and agreement for treatment.    Mental Status Evaluation:  Appearance:  age appropriate, casually dressed, and disheveled   Behavior:  psychomotor retardation   Speech:  soft and slight paucity of speech   Mood:  anxious, depressed, and dysthymic   Affect:  blunted and mood-congruent   Language sparse   Thought Process:  goal directed, possible thought blocking   Thought Content:  Appears paranoid, negative thinking   Perceptual Disturbances: Auditory hallucinations without commands   Risk Potential: Suicidal Ideations none, Homicidal Ideations none, and Potential for Aggression No   Sensorium:  person, place, and  time/date   Cognition:  recent and remote memory grossly intact   Consciousness:  alert and awake    Attention: attention span appeared shorter than expected for age   Insight:  limited   Judgment: limited   Intellect Not assessed   Gait/Station: normal gait/station and normal balance   Motor Activity: no abnormal movements     Memory: Short and long term memory  fair     Progress Toward Goals: unchanged , as evidenced by their participation (or lack thereof) in individual, social and therapeutic milieu in addition to adherence to their medication regimen.    Recommended Treatment:   See above for assessment and plan.    Inpatient Psychiatric Certification: Based upon physical, mental and social evaluations, I certify that inpatient psychiatric services are medically necessary for this patient for a duration of greater than 2 midnights for the treatment of Schizoaffective disorder, bipolar type (HCC) including psychotropic medication management, participation in the therapeutic milieu and referrals as indicated. Available alternative community resources do not meet the patient's mental health care needs. I further attest that an established written individualized plan of care has been implemented and is outlined in the patient's medical records.    Counseling/Coordination of Care    I have expended greater than 15 minutes in which more than 50% of this time was expended in counseling/coordination of patient care relating to diagnostic results, prognosis, potential risks and benefits of management options, instructions for appropriate management, patient and/or collateral education, importance of adherence to management and/or risk factor reductions. Patient's rights, confidentiality, exceptions to confidentiality, use of electronic medical record including appropriate staff access to medical record regarding behavioral health services and consent to treatment were reviewed.    Note Share:     This note was not  shared with the patient due to reasonable likelihood of causing patient harm     This note has been constructed using a voice recognition system. There may be translation, syntax,  or grammatical errors. If you have any questions, please contact the dictating provider.    Jordan Christopher Holter, DO 02/14/25

## 2025-02-15 PROCEDURE — 99232 SBSQ HOSP IP/OBS MODERATE 35: CPT | Performed by: PHYSICIAN ASSISTANT

## 2025-02-15 RX ADMIN — DOCUSATE SODIUM 100 MG: 100 CAPSULE, LIQUID FILLED ORAL at 17:12

## 2025-02-15 RX ADMIN — NICOTINE POLACRILEX 2 MG: 2 GUM, CHEWING ORAL at 12:38

## 2025-02-15 RX ADMIN — MELATONIN TAB 3 MG 9 MG: 3 TAB at 21:09

## 2025-02-15 RX ADMIN — PANTOPRAZOLE SODIUM 20 MG: 20 TABLET, DELAYED RELEASE ORAL at 08:40

## 2025-02-15 RX ADMIN — LORATADINE 10 MG: 10 TABLET ORAL at 08:41

## 2025-02-15 RX ADMIN — HYDROCHLOROTHIAZIDE 12.5 MG: 12.5 TABLET ORAL at 08:41

## 2025-02-15 RX ADMIN — PROPRANOLOL HYDROCHLORIDE 10 MG: 10 TABLET ORAL at 21:09

## 2025-02-15 RX ADMIN — HALOPERIDOL 7.5 MG: 5 TABLET ORAL at 08:38

## 2025-02-15 RX ADMIN — AMLODIPINE BESYLATE 5 MG: 5 TABLET ORAL at 08:39

## 2025-02-15 RX ADMIN — HALOPERIDOL 7.5 MG: 5 TABLET ORAL at 17:12

## 2025-02-15 RX ADMIN — DOCUSATE SODIUM 100 MG: 100 CAPSULE, LIQUID FILLED ORAL at 08:41

## 2025-02-15 RX ADMIN — MULTIPLE VITAMINS W/ MINERALS TAB 1 TABLET: TAB ORAL at 08:41

## 2025-02-15 RX ADMIN — NICOTINE POLACRILEX 2 MG: 2 GUM, CHEWING ORAL at 17:12

## 2025-02-15 RX ADMIN — PROPRANOLOL HYDROCHLORIDE 10 MG: 10 TABLET ORAL at 08:38

## 2025-02-15 RX ADMIN — SENNOSIDES AND DOCUSATE SODIUM 2 TABLET: 50; 8.6 TABLET ORAL at 21:09

## 2025-02-15 RX ADMIN — NICOTINE POLACRILEX 2 MG: 2 GUM, CHEWING ORAL at 08:37

## 2025-02-15 RX ADMIN — NICOTINE POLACRILEX 2 MG: 2 GUM, CHEWING ORAL at 21:09

## 2025-02-15 RX ADMIN — ESCITALOPRAM OXALATE 20 MG: 10 TABLET, FILM COATED ORAL at 08:39

## 2025-02-15 RX ADMIN — CLOZAPINE 250 MG: 25 TABLET ORAL at 21:09

## 2025-02-15 NOTE — ASSESSMENT & PLAN NOTE
Review 2/15/2025  Follows with medical  Continue famotidine 20 mg tablet BID per medical team   Continue pantoprazole EC 20 mg tablet every morning per medical team  This is a chronic condition, and is stable unless otherwise noted.  No associated orders from this encounter found during lookback period of 72 hours.

## 2025-02-15 NOTE — ASSESSMENT & PLAN NOTE
Reviewed 2/15/2025  Follows with medical team on hydrochlorothiazide and Norvasc  Blood pressure remains stable at this time.  No associated orders from this encounter found during lookback period of 72 hours.

## 2025-02-15 NOTE — PLAN OF CARE
Problem: Alteration in Thoughts and Perception  Goal: Treatment Goal: Gain control of psychotic behaviors/thinking, reduce/eliminate presenting symptoms and demonstrate improved reality functioning upon discharge  Outcome: Progressing  Goal: Refrain from acting on delusional thinking/internal stimuli  Description: Interventions:  - Monitor patient closely, per order   - Utilize least restrictive measures   - Set reasonable limits, give positive feedback for acceptable   - Administer medications as ordered and monitor of potential side effects  Outcome: Progressing  Goal: Agree to be compliant with medication regime, as prescribed and report medication side effects  Description: Interventions:  - Offer appropriate PRN medication and supervise ingestion; conduct AIMS, as needed   Outcome: Progressing  Goal: Recognize dysfunctional thoughts, communicate reality-based thoughts at the time of discharge  Description: Interventions:  - Provide medication and psycho-education to assist patient in compliance and developing insight into his/her illness   Outcome: Progressing     Problem: Ineffective Coping  Goal: Patient/Family participate in treatment and DC plans  Description: Interventions:  - Provide therapeutic environment  Outcome: Progressing  Goal: Patient/Family verbalizes awareness of resources  Outcome: Progressing     Problem: Depression  Goal: Verbalize thoughts and feelings  Description: Interventions:  - Assess and re-assess patient's level of risk   - Engage patient in 1:1 interactions, daily, for a minimum of 15 minutes   - Encourage patient to express feelings, fears, frustrations, hopes   Outcome: Progressing  Goal: Refrain from harming self  Description: Interventions:  - Monitor patient closely, per order   - Supervise medication ingestion, monitor effects and side effects   Outcome: Progressing  Goal: Refrain from isolation  Description: Interventions:  - Develop a trusting relationship   - Encourage  socialization   Outcome: Progressing     Problem: Anxiety  Goal: Anxiety is at manageable level  Description: Interventions:  - Assess and monitor patient's anxiety level.   - Monitor for signs and symptoms (heart palpitations, chest pain, shortness of breath, headaches, nausea, feeling jumpy, restlessness, irritable, apprehensive).   - Collaborate with interdisciplinary team and initiate plan and interventions as ordered.  - [place] patient to unit/surroundings  - Explain treatment plan  - Encourage participation in care  - Encourage verbalization of concerns/fears  - Identify coping mechanisms  - Assist in developing anxiety-reducing skills  - Administer/offer alternative therapies  - Limit or eliminate stimulants  Outcome: Progressing     Problem: Alteration in Orientation  Goal: Treatment Goal: Demonstrate a reduction of confusion and improved orientation to person, place, time and/or situation upon discharge, according to optimum baseline/ability  Outcome: Progressing  Goal: Allow medical examinations, as recommended  Description: Interventions:  - Provide physical/neurological exams and/or referrals, per provider   Outcome: Progressing  Goal: Cooperate with recommended testing/procedures  Description: Interventions:  - Determine need for ancillary testing  - Observe for mental status changes  - Implement falls/precaution protocol   Outcome: Progressing  Goal: Complete daily ADLs, including personal hygiene independently, as able  Description: Interventions:  - Observe, teach, and assist patient with ADLS  Outcome: Progressing     Problem: DISCHARGE PLANNING  Goal: Discharge to home with appropriate resources  Description: INTERVENTIONS:  - Identify barriers to discharge w/patient and caregiver  - Arrange for needed discharge resources and transportation as appropriate  - Identify discharge learning needs (meds, wound care, etc.)  - Refer to Case Management Department for coordinating discharge planning if the  patient needs post-hospital services based on physician/advanced practitioner order or complex needs related to functional status, cognitive ability, or social support system  Outcome: Progressing

## 2025-02-15 NOTE — ASSESSMENT & PLAN NOTE
Reviewed to 2/15/25  Follows with medical team  No associated orders from this encounter found during lookback period of 72 hours.

## 2025-02-15 NOTE — ASSESSMENT & PLAN NOTE
Reviewed 2/15/2025  On nicotine gum as needed and encouraged smoking cessation  Continue to encourage cessation upon discharge  No associated orders from this encounter found during lookback period of 72 hours.

## 2025-02-15 NOTE — ASSESSMENT & PLAN NOTE
Reviewed 2/15/2025  Follows with medical team  Continue senna-docusate sodium dose per medical team.   Continue with regular bowel movements, refused miralax this AM.  No associated orders from this encounter found during lookback period of 72 hours.

## 2025-02-15 NOTE — PLAN OF CARE
Problem: Alteration in Thoughts and Perception  Goal: Treatment Goal: Gain control of psychotic behaviors/thinking, reduce/eliminate presenting symptoms and demonstrate improved reality functioning upon discharge  Outcome: Progressing  Goal: Refrain from acting on delusional thinking/internal stimuli  Description: Interventions:  - Monitor patient closely, per order   - Utilize least restrictive measures   - Set reasonable limits, give positive feedback for acceptable   - Administer medications as ordered and monitor of potential side effects  Outcome: Progressing  Goal: Agree to be compliant with medication regime, as prescribed and report medication side effects  Description: Interventions:  - Offer appropriate PRN medication and supervise ingestion; conduct AIMS, as needed   Outcome: Progressing  Goal: Recognize dysfunctional thoughts, communicate reality-based thoughts at the time of discharge  Description: Interventions:  - Provide medication and psycho-education to assist patient in compliance and developing insight into his/her illness   Outcome: Progressing     Problem: Ineffective Coping  Goal: Patient/Family participate in treatment and DC plans  Description: Interventions:  - Provide therapeutic environment  Outcome: Progressing  Goal: Patient/Family verbalizes awareness of resources  Outcome: Progressing     Problem: Depression  Goal: Verbalize thoughts and feelings  Description: Interventions:  - Assess and re-assess patient's level of risk   - Engage patient in 1:1 interactions, daily, for a minimum of 15 minutes   - Encourage patient to express feelings, fears, frustrations, hopes   Outcome: Progressing  Goal: Refrain from harming self  Description: Interventions:  - Monitor patient closely, per order   - Supervise medication ingestion, monitor effects and side effects   Outcome: Progressing  Goal: Refrain from isolation  Description: Interventions:  - Develop a trusting relationship   - Encourage  socialization   Outcome: Progressing     Problem: Anxiety  Goal: Anxiety is at manageable level  Description: Interventions:  - Assess and monitor patient's anxiety level.   - Monitor for signs and symptoms (heart palpitations, chest pain, shortness of breath, headaches, nausea, feeling jumpy, restlessness, irritable, apprehensive).   - Collaborate with interdisciplinary team and initiate plan and interventions as ordered.  - [place] patient to unit/surroundings  - Explain treatment plan  - Encourage participation in care  - Encourage verbalization of concerns/fears  - Identify coping mechanisms  - Assist in developing anxiety-reducing skills  - Administer/offer alternative therapies  - Limit or eliminate stimulants  Outcome: Progressing     Problem: Alteration in Orientation  Goal: Treatment Goal: Demonstrate a reduction of confusion and improved orientation to person, place, time and/or situation upon discharge, according to optimum baseline/ability  Outcome: Progressing  Goal: Allow medical examinations, as recommended  Description: Interventions:  - Provide physical/neurological exams and/or referrals, per provider   Outcome: Progressing  Goal: Cooperate with recommended testing/procedures  Description: Interventions:  - Determine need for ancillary testing  - Observe for mental status changes  - Implement falls/precaution protocol   Outcome: Progressing  Goal: Complete daily ADLs, including personal hygiene independently, as able  Description: Interventions:  - Observe, teach, and assist patient with ADLS  Outcome: Progressing     Problem: DISCHARGE PLANNING  Goal: Discharge to home with appropriate resources  Description: INTERVENTIONS:  - Identify barriers to discharge w/patient and caregiver  - Arrange for needed discharge resources and transportation as appropriate  - Identify discharge learning needs (meds, wound care, etc.)  - Refer to Case Management Department for coordinating discharge planning if the  patient needs post-hospital services based on physician/advanced practitioner order or complex needs related to functional status, cognitive ability, or social support system  Outcome: Progressing

## 2025-02-15 NOTE — NURSING NOTE
Alert, cooperative and visible intermittently socializing with selective peers. Consumed 100% of dinner. Took all medication without prompting. Maintained on safe precautions without incident.

## 2025-02-15 NOTE — PROGRESS NOTES
Progress Note - Behavioral Health   Name: Alberto Berumen 29 y.o. male I MRN: [id number]  Unit/Bed#: [facility] 112-02 I Date of Admission: 3/29/2024   Date of Service: 2/15/2025 I Hospital Day: 323     Assessment & Plan  Schizoaffective disorder, bipolar type (HCC)    Continues to report feeling tired and is isolative to self and to room.  Compliant with medications.  Continues to report auditory hallucinations to hurt him and his family to nursing staff.  Has not had any aggression or agitation in hospital.    Continue medication as follows:  Continue Clozapine 250 mg QHS for psychosis (previously increased 12/11/24)  02/04: , BNP 10, CRP 9.5  02/04: ANC 4.42  To consider further careful titration  CBC w/diff and CK every 2 weeks  Continue Haldol 7.5 mg BID for psychosis , increased on 2/13/2025  Continue Lexapro 20 mg daily for depression and anxiety  Continue Propanolol 10 mg BID for anxiety   Continue Melatonin 9 mg QHS for sleep  Continue Colace and Senna-Docusate sodium 50 mg tablet QHS for constipation  Continue Zofran 4 mg tablet Q6H PRN for nausea   Continue PRN mouth kote for xerostomia     - Pt remains on dual antipsychotic Tx due to failure on monotherapy   .  - S/p ECT treatments.  - ACT team referral was made for him living back with his aunt once MA funding comes through from the [facility] and sister will try to reapply  No associated orders from this encounter found during lookback period of 72 hours.    Chronic idiopathic constipation  Reviewed 2/15/2025  Follows with medical team  Continue senna-docusate sodium dose per medical team.   Continue with regular bowel movements, refused miralax this AM.  No associated orders from this encounter found during lookback period of 72 hours.  GERD (gastroesophageal reflux disease)  Review 2/15/2025  Follows with medical  Continue famotidine 20 mg tablet BID per medical team   Continue pantoprazole EC 20 mg tablet every morning per medical  team  This is a chronic condition, and is stable unless otherwise noted.  No associated orders from this encounter found during lookback period of 72 hours.  Tobacco abuse  Reviewed 2/15/2025  On nicotine gum as needed and encouraged smoking cessation  Continue to encourage cessation upon discharge  No associated orders from this encounter found during lookback period of 72 hours.  Elevated hemoglobin A1c  Reviewed to 2/15/25  Follows with medical team  No associated orders from this encounter found during lookback period of 72 hours.  Class 2 obesity in adult  Reviewed  2/15/25  Follows with medical team and given dietary counseling and need for exercise  No associated orders from this encounter found during lookback period of 72 hours.    Primary hypertension  Reviewed 2/15/2025  Follows with medical team on hydrochlorothiazide and Norvasc  Blood pressure remains stable at this time.  No associated orders from this encounter found during lookback period of 72 hours.    Current medications:  Current Facility-Administered Medications   Medication Dose Route Frequency Provider Last Rate    acetaminophen  650 mg Oral Q4H PRN Jordan C Holter, DO      acetaminophen  650 mg Oral Q6H PRN HOLLI Lion      aluminum-magnesium hydroxide-simethicone  30 mL Oral Q4H PRN Jordan C Holter, DO      amLODIPine  5 mg Oral Daily HOLLI Lion      Artificial Tears  1 drop Both Eyes Q3H PRN Jordan C Holter, DO      haloperidol lactate  2.5 mg Intramuscular Q4H PRN Max 4/day HOLLI Lion      And    LORazepam  1 mg Intramuscular Q4H PRN Max 4/day HOLLI Lion      And    benztropine  0.5 mg Intramuscular Q4H PRN Max 4/day HOLLI Lion      haloperidol lactate  5 mg Intramuscular Q4H PRN Max 4/day HOLLI Lion      And    LORazepam  2 mg Intramuscular Q4H PRN Max 4/day HOLLI Lion      And    benztropine  1 mg Intramuscular Q4H PRN Max 4/day HOLLI Lion      bisacodyl  10 mg Rectal Daily  PRN HOLLI Lion      calcium carbonate  500 mg Oral BID PRN HOLLI Monge      cloZAPine  250 mg Oral HS Bora Rosario MD      hydrOXYzine HCL  50 mg Oral Q6H PRN Max 4/day Jordan C Holter, DO      Or    diphenhydrAMINE  50 mg Intramuscular Q6H PRN Jordan C Holter, DO      hydrOXYzine HCL  50 mg Oral Q6H PRN Max 4/day HOLLI Lion      Or    diphenhydrAMINE  50 mg Intramuscular Q6H PRN HOLLI Lion      diphenhydrAMINE-zinc acetate   Topical BID PRN HOLLI Lion      docusate sodium  100 mg Oral BID Jourdan Dickens, DO      escitalopram  20 mg Oral Daily HOLLI Lion      famotidine  20 mg Oral BID PRN HOLLI Monge      fluticasone  1 spray Each Nare Daily PRN HOLLI Monge      haloperidol  1 mg Oral Q6H PRN HOLLI Lion      haloperidol  2.5 mg Oral Q4H PRN Max 4/day HOLLI Lion      haloperidol  5 mg Oral Q4H PRN Max 4/day HOLLI Lion      haloperidol  7.5 mg Oral BID Jordan C Holter, DO      hydroCHLOROthiazide  12.5 mg Oral Daily HOLLI Galvan      hydrocortisone   Topical 4x Daily PRN HOLLI Lion      hydrOXYzine HCL  100 mg Oral Q6H PRN Max 4/day Jordan C Holter, DO      Or    LORazepam  2 mg Intramuscular Q6H PRN Jordan C Holter, DO      hydrOXYzine HCL  100 mg Oral Q6H PRN Max 4/day HOLLI Lion      Or    LORazepam  2 mg Intramuscular Q6H PRN HOLLI Lion      hydrOXYzine HCL  25 mg Oral Q6H PRN Max 4/day Jordan C Holter, DO      ibuprofen  600 mg Oral Q8H PRN HOLLI Lion      influenza vaccine  0.5 mL Intramuscular Once Bora Rosario MD      loratadine  10 mg Oral Daily Brina Guillen MD      magnesium hydroxide  30 mL Oral Once Jourdan Dickens, DO      melatonin  3 mg Oral HS PRN Bora Rosario MD      melatonin  9 mg Oral HS Bora Rosario MD      methocarbamol  500 mg Oral Q6H PRN HOLLI Lion      multivitamin-minerals  1 tablet Oral Daily Eileen Jensen,  CRNP      nicotine polacrilex  2 mg Oral Q4H PRN Bora Rosario MD      OLANZapine  5 mg Oral Q4H PRN Max 3/day [facility] Holter, DO      Or    OLANZapine  2.5 mg Intramuscular Q4H PRN Max 3/day [facility] Holter, DO      OLANZapine  5 mg Oral Q3H PRN Max 3/day [facility] Holter, DO      Or    OLANZapine  5 mg Intramuscular Q3H PRN Max 3/day [facility] Holter, DO      OLANZapine  2.5 mg Oral Q4H PRN Max 6/day [facility] Holter, DO      ondansetron  4 mg Oral Q6H PRN HOLLI Lion      pantoprazole  20 mg Oral QAM [facility] Holter, DO      polyethylene glycol  17 g Oral Daily PRN Sofi Yoo, CRNP      polyethylene glycol  17 g Oral Daily Jourdan Dickens, DO      propranolol  10 mg Oral Q12H KAYLIE HOLLI Lion      saliva substitute  5 spray Mouth/Throat 4x Daily PRN Mary Jo Reich PA-C      senna-docusate sodium  1 tablet Oral Daily PRN [facility] Cresencioter, DO      senna-docusate sodium  2 tablet Oral HS Jourdan Dickens, DO      traZODone  50 mg Oral HS PRN HOLLI Lion      white petrolatum-mineral oil   Topical TID PRN HOLLI Lion          Risks/Benefits of Treatment:     Risks, benefits, and possible side effects of medications explained to patient. Patient has limited understanding of risks and benefits of treatment at this time, but agrees to take medications as prescribed.    Progress Toward Goals:  Minimal change    Treatment Planning:     All current active medications have been reviewed.  Continue to monitor response to treatment and assess for potential side effects of medications.  Encourage group therapy, milieu therapy and occupational therapy  Collaboration with medical service for medical comorbidities as indicated.  Behavioral Health checks for safety monitoring.  Long Stay Certification: Discharge to an unsupervised setting will represent a significant deterioration in ability to function and present a severe risk to the patient's physical and mental health, due to the patient's chronic  "psychiatric illness (psychosis) and ongoing psychotic symptoms. The patient cannot be safety treated at a lower level of care and requires further psychiatric evaluation, medication treatment and other treatment which can be reasonably be provided only in a psychiatric hospital  Estimated Discharge Day: 3/31/2025       Subjective     Behavior over the last 24 hours: unchanged.    Alberto was seen for follow-up, chart reviewed and discussed with nursing staff.  Nursing staff notes ongoing anxiety and depression.  Overall states that auditory hallucinations have improved however he reported to evening staff that he continues with auditory hallucinations that threaten to hurt him and his family.  He slept well last night and has been cooperative with medications.  No aggressive or agitated behavior.  Attended 6 out of 10 groups yesterday.    Patient seen this morning lying in bed.  He is calm and cooperative on approach.  Describes his mood as \"stable\" and states that he feels as though things are gradually improving.  States he continues with auditory hallucinations but they are less and denied any auditory hallucinations so far this morning.  Denies any visual hallucinations.  No thoughts of self-harm or harming anyone else at this time.  Denies any side effects with his medication.  Tells me that he feels \"tired\" but denies any physical pain or discomfort.    Sleep: normal  Appetite: poor  Medication side effects: No  ROS: review of systems as noted above in HPI/Subjective report, all other systems are negative    Objective :  Temp:  [97.5 °F (36.4 °C)-97.8 °F (36.6 °C)] 97.8 °F (36.6 °C)  HR:  [74-92] 74  BP: (137-141)/(67-81) 141/67  Resp:  [16-18] 18  SpO2:  [99 %] 99 %  O2 Device: None (Room air)    Temp:  [97.5 °F (36.4 °C)-97.8 °F (36.6 °C)] 97.8 °F (36.6 °C)  HR:  [74-92] 74  BP: (137-141)/(67-81) 141/67  Resp:  [16-18] 18  SpO2:  [99 %] 99 %  O2 Device: None (Room air)    Mental Status " "Evaluation:    Appearance:  disheveled   Behavior:  psychomotor retardation   Speech:  scant, soft   Mood:  \"Stable\"   Affect:  blunted, mood-incongruent   Thought Process:  goal directed   Thought Content:  some paranoia   Perceptual Disturbances: auditory hallucinations still present, but less frequent   Risk Potential: Suicidal Ideation -  None at present  Homicidal Ideation -  None at present  Potential for Aggression - No   Sensorium:  oriented to person, place, and time/date   Memory:  recent and remote memory grossly intact   Consciousness:  alert and awake   Attention/Concentration: attention span and concentration appear shorter than expected for age   Insight:  impaired   Judgment: limited   Gait/Station: normal gait/station, normal balance   Motor Activity: no abnormal movements       Lab Results: I have reviewed the following results:  CBC:   Lab Results   Component Value Date    WBC 9.74 02/04/2025    RBC 5.54 02/04/2025    HGB 12.9 02/04/2025    HCT 43.5 02/04/2025    MCV 79 (L) 02/04/2025     02/04/2025    MCH 23.3 (L) 02/04/2025    MCHC 29.7 (L) 02/04/2025    RDW 14.1 02/04/2025    MPV 9.8 02/04/2025    NEUTROABS 4.42 02/04/2025     Clozapine:   Lab Results   Component Value Date    CLOZAPINE 288 (L) 11/19/2024       Administrative Statements     Counseling / Coordination of Care:  Patient's progress discussed with staff in treatment team meeting.  Medication changes reviewed with staff in treatment team meeting..    Monica Artis PA-C 02/15/25  "

## 2025-02-15 NOTE — ASSESSMENT & PLAN NOTE
Reviewed  2/15/25  Follows with medical team and given dietary counseling and need for exercise  No associated orders from this encounter found during lookback period of 72 hours.

## 2025-02-15 NOTE — ASSESSMENT & PLAN NOTE
Continues to report feeling tired and is isolative to self and to room.  Compliant with medications.  Continues to report auditory hallucinations to hurt him and his family to nursing staff.  Has not had any aggression or agitation in hospital.    Continue medication as follows:  Continue Clozapine 250 mg QHS for psychosis (previously increased 12/11/24)  02/04: , BNP 10, CRP 9.5  02/04: ANC 4.42  To consider further careful titration  CBC w/diff and CK every 2 weeks  Continue Haldol 7.5 mg BID for psychosis , increased on 2/13/2025  Continue Lexapro 20 mg daily for depression and anxiety  Continue Propanolol 10 mg BID for anxiety   Continue Melatonin 9 mg QHS for sleep  Continue Colace and Senna-Docusate sodium 50 mg tablet QHS for constipation  Continue Zofran 4 mg tablet Q6H PRN for nausea   Continue PRN mouth kote for xerostomia     - Pt remains on dual antipsychotic Tx due to failure on monotherapy   .  - S/p ECT treatments.  - ACT team referral was made for him living back with his aunt once MA funding comes through from the [facility] and sister will try to reapply  No associated orders from this encounter found during lookback period of 72 hours.

## 2025-02-16 PROCEDURE — 99232 SBSQ HOSP IP/OBS MODERATE 35: CPT | Performed by: PHYSICIAN ASSISTANT

## 2025-02-16 RX ADMIN — PANTOPRAZOLE SODIUM 20 MG: 20 TABLET, DELAYED RELEASE ORAL at 08:49

## 2025-02-16 RX ADMIN — HALOPERIDOL 7.5 MG: 5 TABLET ORAL at 17:21

## 2025-02-16 RX ADMIN — NICOTINE POLACRILEX 2 MG: 2 GUM, CHEWING ORAL at 17:21

## 2025-02-16 RX ADMIN — MELATONIN TAB 3 MG 9 MG: 3 TAB at 21:16

## 2025-02-16 RX ADMIN — DOCUSATE SODIUM 100 MG: 100 CAPSULE, LIQUID FILLED ORAL at 17:21

## 2025-02-16 RX ADMIN — MULTIPLE VITAMINS W/ MINERALS TAB 1 TABLET: TAB ORAL at 08:48

## 2025-02-16 RX ADMIN — HALOPERIDOL 7.5 MG: 5 TABLET ORAL at 08:49

## 2025-02-16 RX ADMIN — CLOZAPINE 250 MG: 25 TABLET ORAL at 21:16

## 2025-02-16 RX ADMIN — LORATADINE 10 MG: 10 TABLET ORAL at 08:50

## 2025-02-16 RX ADMIN — PROPRANOLOL HYDROCHLORIDE 10 MG: 10 TABLET ORAL at 21:17

## 2025-02-16 RX ADMIN — ESCITALOPRAM OXALATE 20 MG: 10 TABLET, FILM COATED ORAL at 08:50

## 2025-02-16 RX ADMIN — DOCUSATE SODIUM 100 MG: 100 CAPSULE, LIQUID FILLED ORAL at 08:49

## 2025-02-16 RX ADMIN — NICOTINE POLACRILEX 2 MG: 2 GUM, CHEWING ORAL at 08:49

## 2025-02-16 RX ADMIN — NICOTINE POLACRILEX 2 MG: 2 GUM, CHEWING ORAL at 21:16

## 2025-02-16 RX ADMIN — SENNOSIDES AND DOCUSATE SODIUM 2 TABLET: 50; 8.6 TABLET ORAL at 21:16

## 2025-02-16 NOTE — ASSESSMENT & PLAN NOTE
Reviewed 2/16/2025  Follows with medical team  Continue senna-docusate sodium dose per medical team.   Continue with regular bowel movements, refused miralax this AM.  No associated orders from this encounter found during lookback period of 72 hours.

## 2025-02-16 NOTE — ASSESSMENT & PLAN NOTE
Reviewed  2/16/25  Follows with medical team and given dietary counseling and need for exercise, most recent BMI 38.48 on 2/9/2025  No associated orders from this encounter found during lookback period of 72 hours.     Additional Area 1 Location: lip lines

## 2025-02-16 NOTE — ASSESSMENT & PLAN NOTE
Reviewed 2/16/2025  Follows with medical team on hydrochlorothiazide and Norvasc  Blood pressure remains stable at this time.  No associated orders from this encounter found during lookback period of 72 hours.

## 2025-02-16 NOTE — ASSESSMENT & PLAN NOTE
Reviewed to 2/16/25  Follows with medical team  No associated orders from this encounter found during lookback period of 72 hours.

## 2025-02-16 NOTE — PLAN OF CARE
Problem: Alteration in Thoughts and Perception  Goal: Agree to be compliant with medication regime, as prescribed and report medication side effects  Description: Interventions:  - Offer appropriate PRN medication and supervise ingestion; conduct AIMS, as needed   Outcome: Progressing     Problem: Depression  Goal: Verbalize thoughts and feelings  Description: Interventions:  - Assess and re-assess patient's level of risk   - Engage patient in 1:1 interactions, daily, for a minimum of 15 minutes   - Encourage patient to express feelings, fears, frustrations, hopes   Outcome: Progressing  Goal: Refrain from harming self  Description: Interventions:  - Monitor patient closely, per order   - Supervise medication ingestion, monitor effects and side effects   Outcome: Progressing  Goal: Refrain from isolation  Description: Interventions:  - Develop a trusting relationship   - Encourage socialization   Outcome: Progressing  Goal: Refrain from self-neglect  Outcome: Progressing     Problem: Anxiety  Goal: Anxiety is at manageable level  Description: Interventions:  - Assess and monitor patient's anxiety level.   - Monitor for signs and symptoms (heart palpitations, chest pain, shortness of breath, headaches, nausea, feeling jumpy, restlessness, irritable, apprehensive).   - Collaborate with interdisciplinary team and initiate plan and interventions as ordered.  - [place] patient to unit/surroundings  - Explain treatment plan  - Encourage participation in care  - Encourage verbalization of concerns/fears  - Identify coping mechanisms  - Assist in developing anxiety-reducing skills  - Administer/offer alternative therapies  - Limit or eliminate stimulants  Outcome: Progressing     Problem: Alteration in Orientation  Goal: Interact with staff daily  Description: Interventions:  - Assess and re-assess patient's level of orientation  - Engage patient in 1 on 1 interactions, daily, for a minimum of 15 minutes   - Establish  rapport/trust with patient   Outcome: Progressing  Goal: Complete daily ADLs, including personal hygiene independently, as able  Description: Interventions:  - Observe, teach, and assist patient with ADLS  Outcome: Progressing     Problem: Depression  Goal: Treatment Goal: Demonstrate behavioral control of depressive symptoms, verbalize feelings of improved mood/affect, and adopt new coping skills prior to discharge  Outcome: Not Progressing

## 2025-02-16 NOTE — NURSING NOTE
Patient visible all evening. Initially found in his room  and encouraged to come out. Watching television with peers afterwards. Medication compliant. Cooperative and behaviors controlled. Offered no physical complaints. Uneventful evening

## 2025-02-16 NOTE — ASSESSMENT & PLAN NOTE
Review 2/16/2025  Follows with medical  Continue famotidine 20 mg tablet BID per medical team   Continue pantoprazole EC 20 mg tablet every morning per medical team  This is a chronic condition, and is stable unless otherwise noted.  No associated orders from this encounter found during lookback period of 72 hours.

## 2025-02-16 NOTE — NURSING NOTE
Pt is calm, cooperative and visible on milieu. Pt reports AH, depression and anxiety; denies SI/HI/VH. Med/meal compliant. Social with staff and peers. Refused to awaken for weekly wellness assessment. Q 15 min checks maintained.

## 2025-02-16 NOTE — PROGRESS NOTES
Progress Note - Behavioral Health   Name: Alberto Berumen 29 y.o. male I MRN: [id number]  Unit/Bed#: [facility] 112-02 I Date of Admission: 3/29/2024   Date of Service: 2/16/2025 I Hospital Day: 324    Assessment & Plan  Schizoaffective disorder, bipolar type (HCC)  Reviewed 2/16/2025  He remains isolative to self and to room.  compliant with medications.  Continues to report auditory hallucinations to hurt him and his family to nursing staff.  Has not had any aggression or agitation in hospital.    Continue medication as follows:  Continue Clozapine 250 mg QHS for psychosis (previously increased 12/11/24)  02/04: , BNP 10, CRP 9.5  02/04: ANC 4.42  To consider further careful titration  CBC w/diff and CK every 2 weeks  Continue Haldol 7.5 mg BID for psychosis , increased on 2/13/2025  Continue Lexapro 20 mg daily for depression and anxiety  Continue Propanolol 10 mg BID for anxiety   Continue Melatonin 9 mg QHS for sleep  Continue Colace and Senna-Docusate sodium 50 mg tablet QHS for constipation  Continue Zofran 4 mg tablet Q6H PRN for nausea   Continue PRN mouth kote for xerostomia     - Pt remains on dual antipsychotic Tx due to failure on monotherapy   .  - S/p ECT treatments.  - ACT team referral was made for him living back with his aunt once MA funding comes through from the [facility] and sister will try to reapply  No associated orders from this encounter found during lookback period of 72 hours.    Chronic idiopathic constipation  Reviewed 2/16/2025  Follows with medical team  Continue senna-docusate sodium dose per medical team.   Continue with regular bowel movements, refused miralax this AM.  No associated orders from this encounter found during lookback period of 72 hours.  GERD (gastroesophageal reflux disease)  Review 2/16/2025  Follows with medical  Continue famotidine 20 mg tablet BID per medical team   Continue pantoprazole EC 20 mg tablet every morning per medical team  This is a  chronic condition, and is stable unless otherwise noted.  No associated orders from this encounter found during lookback period of 72 hours.  Tobacco abuse  Reviewed 2/16/2025  On nicotine gum as needed and encouraged smoking cessation  Continue to encourage cessation upon discharge  No associated orders from this encounter found during lookback period of 72 hours.  Elevated hemoglobin A1c  Reviewed to 2/16/25  Follows with medical team  No associated orders from this encounter found during lookback period of 72 hours.  Class 2 obesity in adult  Reviewed  2/16/25  Follows with medical team and given dietary counseling and need for exercise, most recent BMI 38.48 on 2/9/2025  No associated orders from this encounter found during lookback period of 72 hours.    Primary hypertension  Reviewed 2/16/2025  Follows with medical team on hydrochlorothiazide and Norvasc  Blood pressure remains stable at this time.  No associated orders from this encounter found during lookback period of 72 hours.    Current medications:  Current Facility-Administered Medications   Medication Dose Route Frequency Provider Last Rate    acetaminophen  650 mg Oral Q4H PRN Jordan C Holter, DO      acetaminophen  650 mg Oral Q6H PRN HOLLI Lion      aluminum-magnesium hydroxide-simethicone  30 mL Oral Q4H PRN Jordan C Holter, DO      amLODIPine  5 mg Oral Daily HOLLI Lion      Artificial Tears  1 drop Both Eyes Q3H PRN Jordan C Holter, DO      haloperidol lactate  2.5 mg Intramuscular Q4H PRN Max 4/day HOLLI Lion      And    LORazepam  1 mg Intramuscular Q4H PRN Max 4/day HOLLI Lion      And    benztropine  0.5 mg Intramuscular Q4H PRN Max 4/day HOLLI Lion      haloperidol lactate  5 mg Intramuscular Q4H PRN Max 4/day HOLLI Lion      And    LORazepam  2 mg Intramuscular Q4H PRN Max 4/day HOLLI Lion      And    benztropine  1 mg Intramuscular Q4H PRN Max 4/day HOLLI Lion      bisacodyl   10 mg Rectal Daily PRN HOLLI Lion      calcium carbonate  500 mg Oral BID PRN HOLLI Monge      cloZAPine  250 mg Oral HS Bora Rosario MD      hydrOXYzine HCL  50 mg Oral Q6H PRN Max 4/day Jordan C Holter, DO      Or    diphenhydrAMINE  50 mg Intramuscular Q6H PRN Jordan C Holter, DO      hydrOXYzine HCL  50 mg Oral Q6H PRN Max 4/day HOLLI Lion      Or    diphenhydrAMINE  50 mg Intramuscular Q6H PRN HOLLI Lion      diphenhydrAMINE-zinc acetate   Topical BID PRN HOLLI Lion      docusate sodium  100 mg Oral BID Jourdan Dickens, DO      escitalopram  20 mg Oral Daily HOLLI Lion      famotidine  20 mg Oral BID PRN HOLLI Monge      fluticasone  1 spray Each Nare Daily PRN HOLLI Monge      haloperidol  1 mg Oral Q6H PRN HOLLI Lion      haloperidol  2.5 mg Oral Q4H PRN Max 4/day HOLLI Lion      haloperidol  5 mg Oral Q4H PRN Max 4/day HOLLI Lion      haloperidol  7.5 mg Oral BID Jordan C Holter, DO      hydroCHLOROthiazide  12.5 mg Oral Daily HOLLI Galvan      hydrocortisone   Topical 4x Daily PRN HOLLI Lion      hydrOXYzine HCL  100 mg Oral Q6H PRN Max 4/day Jordan C Holter, DO      Or    LORazepam  2 mg Intramuscular Q6H PRN Jordan C Holter, DO      hydrOXYzine HCL  100 mg Oral Q6H PRN Max 4/day HOLLI Lion      Or    LORazepam  2 mg Intramuscular Q6H PRN HOLLI Lion      hydrOXYzine HCL  25 mg Oral Q6H PRN Max 4/day Jordan C Holter, DO      ibuprofen  600 mg Oral Q8H PRN HOLLI Lion      influenza vaccine  0.5 mL Intramuscular Once Bora Rosario MD      loratadine  10 mg Oral Daily Brina Guillen MD      magnesium hydroxide  30 mL Oral Once Jourdan Dickens, DO      melatonin  3 mg Oral HS PRN Bora Rosario MD      melatonin  9 mg Oral HS Bora Rosario MD      methocarbamol  500 mg Oral Q6H PRN HOLLI Lion      multivitamin-minerals  1 tablet Oral Daily  HOLLI Monge      nicotine polacrilex  2 mg Oral Q4H PRN Bora Rosario MD      OLANZapine  5 mg Oral Q4H PRN Max 3/day [facility] Holter, DO      Or    OLANZapine  2.5 mg Intramuscular Q4H PRN Max 3/day [facility] Holter, DO      OLANZapine  5 mg Oral Q3H PRN Max 3/day [facility] Holter, DO      Or    OLANZapine  5 mg Intramuscular Q3H PRN Max 3/day [facility] Holter, DO      OLANZapine  2.5 mg Oral Q4H PRN Max 6/day [facility] Holter, DO      ondansetron  4 mg Oral Q6H PRN HOLLI Lion      pantoprazole  20 mg Oral QAM [facility] Holter, DO      polyethylene glycol  17 g Oral Daily PRN HOLLI Ghotra      polyethylene glycol  17 g Oral Daily Jourdan Dickens, DO      propranolol  10 mg Oral Q12H KAYLIE HOLLI Lion      saliva substitute  5 spray Mouth/Throat 4x Daily PRN Mary Jo Reich PA-C      senna-docusate sodium  1 tablet Oral Daily PRN [facility] Cresencioter, DO      senna-docusate sodium  2 tablet Oral HS Jourdan Dickens, DO      traZODone  50 mg Oral HS PRN HOLLI Lion      white petrolatum-mineral oil   Topical TID PRN HOLLI Lion          Risks/Benefits of Treatment:     Risks, benefits, and possible side effects of medications explained to patient and patient verbalizes understanding and agreement for treatment.    Progress Toward Goals:  Minimal change, ACT team referral made an assessment pending    Treatment Planning:     All current active medications have been reviewed.  Continue to monitor response to treatment and assess for potential side effects of medications.  Encourage group therapy, milieu therapy and occupational therapy  Collaboration with medical service for medical comorbidities as indicated.  Behavioral Health checks for safety monitoring.  Long Stay Certification: Discharge to an unsupervised setting will represent a significant deterioration in ability to function and present a severe risk to the patient's physical and mental health, due to the patient's  "chronic psychiatric illness (psychotic illness) and ongoing psychotic symptoms. The patient cannot be safety treated at a lower level of care and requires further psychiatric evaluation, medication treatment and other treatment which can be reasonably be provided only in a psychiatric hospital  Estimated Discharge Day: 3/31/2025       Subjective     Behavior over the last 24 hours: unchanged.    Alberto was seen for follow-up, chart reviewed and discussed with nursing staff.  Nursing staff notes he continues to report auditory hallucinations to harm self and family.  Reports anxiety and depression.  He has been compliant with medications.  Isolative to self and to room.  No physical aggression or agitation.  Sleeping at night.  Refuses breakfast but eats 100% for lunch and dinner.    Lela was seen in his room lying in bed.  He is minimally verbal today.  States that he feels \"okay\".  Denies anxiety or depression although he appears flat and withdrawn.  Denies auditory or visual hallucinations when asked.  Appears guarded and dismissive.  Physically he denies any pain or discomfort.    Sleep: normal  Appetite: fair  Medication side effects: No  ROS: review of systems as noted above in HPI/Subjective report, all other systems are negative    Objective :  Temp:  [97.8 °F (36.6 °C)-98 °F (36.7 °C)] 98 °F (36.7 °C)  HR:  [67-91] 67  BP: (106-148)/(57-84) 106/57  Resp:  [18] 18  SpO2:  [98 %] 98 %  O2 Device: None (Room air)    Temp:  [97.8 °F (36.6 °C)-98 °F (36.7 °C)] 98 °F (36.7 °C)  HR:  [67-91] 67  BP: (106-148)/(57-84) 106/57  Resp:  [18] 18  SpO2:  [98 %] 98 %  O2 Device: None (Room air)    Mental Status Evaluation:    Appearance:  age appropriate   Behavior:  does not want to talk, dismissive   Speech:  scant   Mood:  euthymic   Affect:  mood-incongruent, blunted   Thought Process:  concrete, poverty of thought   Thought Content:  some paranoia   Perceptual Disturbances: denies auditory or visual hallucinations " when asked, but appears distracted   Risk Potential: Suicidal Ideation -  None at present  Homicidal Ideation -  None at present  Potential for Aggression - Not at present   Sensorium:  oriented to person, place, and time/date   Memory:  recent and remote memory grossly intact   Consciousness:  alert and awake   Attention/Concentration: decreased attention span and decreased concentration   Insight:  impaired   Judgment: impaired   Gait/Station: normal gait/station, normal balance   Motor Activity: no abnormal movements       Lab Results: I have reviewed the following results:  CBC:   Lab Results   Component Value Date    WBC 9.74 02/04/2025    RBC 5.54 02/04/2025    HGB 12.9 02/04/2025    HCT 43.5 02/04/2025    MCV 79 (L) 02/04/2025     02/04/2025    MCH 23.3 (L) 02/04/2025    MCHC 29.7 (L) 02/04/2025    RDW 14.1 02/04/2025    MPV 9.8 02/04/2025    NEUTROABS 4.42 02/04/2025     Clozapine:   Lab Results   Component Value Date    CLOZAPINE 288 (L) 11/19/2024       Administrative Statements     Counseling / Coordination of Care:   Patient's progress discussed with staff in treatment team meeting.  Medication changes reviewed with staff in treatment team meeting..    Monica Artis PA-C 02/16/25

## 2025-02-16 NOTE — ASSESSMENT & PLAN NOTE
Reviewed 2/16/2025  On nicotine gum as needed and encouraged smoking cessation  Continue to encourage cessation upon discharge  No associated orders from this encounter found during lookback period of 72 hours.

## 2025-02-16 NOTE — ASSESSMENT & PLAN NOTE
Reviewed 2/16/2025  He remains isolative to self and to room.  compliant with medications.  Continues to report auditory hallucinations to hurt him and his family to nursing staff.  Has not had any aggression or agitation in hospital.    Continue medication as follows:  Continue Clozapine 250 mg QHS for psychosis (previously increased 12/11/24)  02/04: , BNP 10, CRP 9.5  02/04: ANC 4.42  To consider further careful titration  CBC w/diff and CK every 2 weeks  Continue Haldol 7.5 mg BID for psychosis , increased on 2/13/2025  Continue Lexapro 20 mg daily for depression and anxiety  Continue Propanolol 10 mg BID for anxiety   Continue Melatonin 9 mg QHS for sleep  Continue Colace and Senna-Docusate sodium 50 mg tablet QHS for constipation  Continue Zofran 4 mg tablet Q6H PRN for nausea   Continue PRN mouth kote for xerostomia     - Pt remains on dual antipsychotic Tx due to failure on monotherapy   .  - S/p ECT treatments.  - ACT team referral was made for him living back with his aunt once MA funding comes through from the [facility] and sister will try to reapply  No associated orders from this encounter found during lookback period of 72 hours.

## 2025-02-17 PROCEDURE — 99232 SBSQ HOSP IP/OBS MODERATE 35: CPT | Performed by: PSYCHIATRY & NEUROLOGY

## 2025-02-17 RX ADMIN — NICOTINE POLACRILEX 2 MG: 2 GUM, CHEWING ORAL at 21:30

## 2025-02-17 RX ADMIN — NICOTINE POLACRILEX 2 MG: 2 GUM, CHEWING ORAL at 17:21

## 2025-02-17 RX ADMIN — MULTIPLE VITAMINS W/ MINERALS TAB 1 TABLET: TAB ORAL at 08:55

## 2025-02-17 RX ADMIN — LORATADINE 10 MG: 10 TABLET ORAL at 08:55

## 2025-02-17 RX ADMIN — NICOTINE POLACRILEX 2 MG: 2 GUM, CHEWING ORAL at 08:56

## 2025-02-17 RX ADMIN — HALOPERIDOL 7.5 MG: 5 TABLET ORAL at 08:54

## 2025-02-17 RX ADMIN — MELATONIN TAB 3 MG 9 MG: 3 TAB at 21:29

## 2025-02-17 RX ADMIN — ESCITALOPRAM OXALATE 20 MG: 10 TABLET, FILM COATED ORAL at 08:56

## 2025-02-17 RX ADMIN — PANTOPRAZOLE SODIUM 20 MG: 20 TABLET, DELAYED RELEASE ORAL at 08:56

## 2025-02-17 RX ADMIN — CLOZAPINE 250 MG: 25 TABLET ORAL at 21:30

## 2025-02-17 RX ADMIN — PROPRANOLOL HYDROCHLORIDE 10 MG: 10 TABLET ORAL at 08:56

## 2025-02-17 RX ADMIN — SENNOSIDES AND DOCUSATE SODIUM 2 TABLET: 50; 8.6 TABLET ORAL at 21:30

## 2025-02-17 RX ADMIN — DOCUSATE SODIUM 100 MG: 100 CAPSULE, LIQUID FILLED ORAL at 08:56

## 2025-02-17 RX ADMIN — HALOPERIDOL 7.5 MG: 5 TABLET ORAL at 17:20

## 2025-02-17 RX ADMIN — HYDROCHLOROTHIAZIDE 12.5 MG: 12.5 TABLET ORAL at 08:56

## 2025-02-17 RX ADMIN — DOCUSATE SODIUM 100 MG: 100 CAPSULE, LIQUID FILLED ORAL at 17:21

## 2025-02-17 RX ADMIN — NICOTINE POLACRILEX 2 MG: 2 GUM, CHEWING ORAL at 12:56

## 2025-02-17 NOTE — ASSESSMENT & PLAN NOTE
Reviewed  2/17/25  Follows with medical team and given dietary counseling and need for exercise, most recent BMI 39.09 on 2/16/2025  No associated orders from this encounter found during lookback period of 72 hours.

## 2025-02-17 NOTE — ASSESSMENT & PLAN NOTE
Reviewed 2/17/2025  Follows with medical team  Continue senna-docusate sodium dose per medical team.   Continue with regular bowel movements, refused miralax this AM.  No associated orders from this encounter found during lookback period of 72 hours.

## 2025-02-17 NOTE — PLAN OF CARE
Problem: Alteration in Thoughts and Perception  Goal: Agree to be compliant with medication regime, as prescribed and report medication side effects  Description: Interventions:  - Offer appropriate PRN medication and supervise ingestion; conduct AIMS, as needed   Outcome: Progressing     Problem: Depression  Goal: Refrain from harming self  Description: Interventions:  - Monitor patient closely, per order   - Supervise medication ingestion, monitor effects and side effects   Outcome: Progressing  Goal: Refrain from isolation  Description: Interventions:  - Develop a trusting relationship   - Encourage socialization   Outcome: Progressing  Goal: Refrain from self-neglect  Outcome: Progressing     Problem: Anxiety  Goal: Anxiety is at manageable level  Description: Interventions:  - Assess and monitor patient's anxiety level.   - Monitor for signs and symptoms (heart palpitations, chest pain, shortness of breath, headaches, nausea, feeling jumpy, restlessness, irritable, apprehensive).   - Collaborate with interdisciplinary team and initiate plan and interventions as ordered.  - [place] patient to unit/surroundings  - Explain treatment plan  - Encourage participation in care  - Encourage verbalization of concerns/fears  - Identify coping mechanisms  - Assist in developing anxiety-reducing skills  - Administer/offer alternative therapies  - Limit or eliminate stimulants  Outcome: Progressing     Problem: Alteration in Orientation  Goal: Treatment Goal: Demonstrate a reduction of confusion and improved orientation to person, place, time and/or situation upon discharge, according to optimum baseline/ability  Outcome: Progressing  Goal: Interact with staff daily  Description: Interventions:  - Assess and re-assess patient's level of orientation  - Engage patient in 1 on 1 interactions, daily, for a minimum of 15 minutes   - Establish rapport/trust with patient   Outcome: Progressing  Goal: Cooperate with recommended  testing/procedures  Description: Interventions:  - Determine need for ancillary testing  - Observe for mental status changes  - Implement falls/precaution protocol   Outcome: Progressing  Goal: Complete daily ADLs, including personal hygiene independently, as able  Description: Interventions:  - Observe, teach, and assist patient with ADLS  Outcome: Progressing     Problem: Alteration in Thoughts and Perception  Goal: Treatment Goal: Gain control of psychotic behaviors/thinking, reduce/eliminate presenting symptoms and demonstrate improved reality functioning upon discharge  Outcome: Not Progressing  Goal: Recognize dysfunctional thoughts, communicate reality-based thoughts at the time of discharge  Description: Interventions:  - Provide medication and psycho-education to assist patient in compliance and developing insight into his/her illness   Outcome: Not Progressing

## 2025-02-17 NOTE — ASSESSMENT & PLAN NOTE
Reviewed 2/17/2025  Follows with medical team on hydrochlorothiazide and Norvasc  Blood pressure remains stable at this time.  No associated orders from this encounter found during lookback period of 72 hours.

## 2025-02-17 NOTE — NURSING NOTE
Pt is calm, cooperative and visible on milieu. Pt reports AH, depression and anxiety; denies SI/HI/VH. Pt interacts appropriately with select peers and reports sleeping well. Pt is able to make needs known and is medication compliant. Q 15 min checks maintained.

## 2025-02-17 NOTE — ASSESSMENT & PLAN NOTE
Review 2/17/2025  Follows with medical  Continue famotidine 20 mg tablet BID per medical team   Continue pantoprazole EC 20 mg tablet every morning per medical team  This is a chronic condition, and is stable unless otherwise noted.  No associated orders from this encounter found during lookback period of 72 hours.

## 2025-02-17 NOTE — NURSING NOTE
Maintained on ongoing SAFE precaution without incident on this shift.  Awake, alert,pleasant and cooperative.  Continues to be compliant with medication regimen. Still struggling with the same auditory hallucination,  utilizing proper coping skill.  Encourage to get out of his room ad be more active.  Became more visible this evening.  Attended and participated in 2 out of 6 group today.

## 2025-02-17 NOTE — ASSESSMENT & PLAN NOTE
Reviewed 2/17/2025  On nicotine gum as needed and encouraged smoking cessation  Continue to encourage cessation upon discharge  No associated orders from this encounter found during lookback period of 72 hours.

## 2025-02-17 NOTE — ASSESSMENT & PLAN NOTE
Reviewed to 2/17/25  Follows with medical team  No associated orders from this encounter found during lookback period of 72 hours.

## 2025-02-17 NOTE — PROGRESS NOTES
02/17/25 0816   Team Meeting   Meeting Type Daily Rounds   Team Members Present   Team Members Present Physician;Nurse;;Other (Discipline and Name)   Physician Team Member Holter, Jayne PA C    Nursing Team Member Claudia   Social Work Team Member Jose J ORTEGA   Patient/Family Present   Patient Present No   Patient's Family Present No     Groups Participation  6/8.   Patient's compliant with medications. He's engaged in his treatment. He's appropriate and interacts with his peers. Reports AH and depressed at times.

## 2025-02-17 NOTE — PROGRESS NOTES
Progress Note - Behavioral Health   Name: Alberto Berumen 29 y.o. male I MRN: [id number]  Unit/Bed#: [facility] 112-02 I Date of Admission: 3/29/2024   Date of Service: 2/17/2025 I Hospital Day: 325     Assessment & Plan  Schizoaffective disorder, bipolar type (HCC)  Reviewed 2/17/2025  He remains isolative to self and is encouraged to leave his bedroom. Compliant with psychiatric medications but has been refusing his miralax. Continues to report auditory hallucinations to hurt him and his family to nursing staff.  Has not had any aggression or agitation in hospital.    Continue medication as follows:  Continue Clozapine 250 mg QHS for psychosis (previously increased 12/11/24)  02/04: , BNP 10, CRP 9.5  02/04: ANC 4.42  To consider further careful titration  CBC w/diff and CK every 2 weeks  Continue Haldol 7.5 mg BID for psychosis , increased on 2/13/2025  Continue Lexapro 20 mg daily for depression and anxiety  Continue Propanolol 10 mg BID for anxiety   Continue Melatonin 9 mg QHS for sleep  Continue Colace and Senna-Docusate sodium 50 mg tablet QHS for constipation  Continue Zofran 4 mg tablet Q6H PRN for nausea   Continue PRN mouth kote for xerostomia     - Pt remains on dual antipsychotic Tx due to failure on monotherapy   .  - S/p ECT treatments.  - ACT team referral was made for him living back with his aunt once MA funding comes through from the [facility] and sister will try to reapply  No associated orders from this encounter found during lookback period of 72 hours.    Chronic idiopathic constipation  Reviewed 2/17/2025  Follows with medical team  Continue senna-docusate sodium dose per medical team.   Continue with regular bowel movements, refused miralax this AM.  No associated orders from this encounter found during lookback period of 72 hours.  GERD (gastroesophageal reflux disease)  Review 2/17/2025  Follows with medical  Continue famotidine 20 mg tablet BID per medical team   Continue  "pantoprazole EC 20 mg tablet every morning per medical team  This is a chronic condition, and is stable unless otherwise noted.  No associated orders from this encounter found during lookback period of 72 hours.  Tobacco abuse  Reviewed 2/17/2025  On nicotine gum as needed and encouraged smoking cessation  Continue to encourage cessation upon discharge  No associated orders from this encounter found during lookback period of 72 hours.  Elevated hemoglobin A1c  Reviewed to 2/17/25  Follows with medical team  No associated orders from this encounter found during lookback period of 72 hours.  Class 2 obesity in adult  Reviewed  2/17/25  Follows with medical team and given dietary counseling and need for exercise, most recent BMI 39.09 on 2/16/2025  No associated orders from this encounter found during lookback period of 72 hours.    Primary hypertension  Reviewed 2/17/2025  Follows with medical team on hydrochlorothiazide and Norvasc  Blood pressure remains stable at this time.  No associated orders from this encounter found during lookback period of 72 hours.    Plan:  Continue prescribed psychotropic medication regimen  Currently on 201 commitment status  Continue to promote patient participation in therapeutic milieu.  Continue medical management per medicine.  Continue behavioral health checks q.15 minutes.   Continue vitals per behavioral health unit protocol.  Safety: Safety and communication plan established to target dynamic risk factors discussed above.   Discharge and disposition: Pending coordination with ACT services, planning to discharge home to his aunt's house.    SUBJECTIVE     Patient evaluated this a.m. for continuity of care. Today, Alberto feels \"tired.\" He reports feeling largely the same as last week stating he feels more depressed at times due to his ongoing struggle with his auditory hallucinations. His hallucinations have remained unchanged for the last 3 years and continue to threaten to kill " him. He denies any change in nature of the voices or command hallucinations. States getting out of bed and socializing helps with the voices. Denies visual hallucinations, suicidal ideation or self-injurious thoughts and behaviors, or homicidal ideation at this time. Continues to feel paranoid in this context.     Alberto does not voice any acute concerns/issues this morning. He denies medication side effects and remains compliant with his psychiatric medications. Has not needed any PRN medications in the last 24 hours. He remains engaged in his treatment and nursing reports no acute behaviors over the weekend. He continues to feel tired since the addition of haldol but is overall sleeping well at night. Eating 2/3 meals, maintaining good hygiene, and reports normal bowel movements without difficulty.     PSYCHIATRIC REVIEW OF SYSTEMS     Behavior over the last 24 hours:  unchanged  Sleep: normal  Appetite: normal  Medication side effects: No    Progress Toward Goals: Limited progress, as evidenced by their participation (or lack thereof) in individual, social and therapeutic milieu in addition to adherence to their medication regimen.  Patient Acceptance: Fair acceptance  Patient Understanding: Fair  Patient Involvement in Reintegration Process: Remains in contact with his aunt and sister routinely.  Patient's Therapeutic Relationship with Psychiatrist: Trusting  Patient's Optimism Regarding Recovery: Pending coordination with ACT services, planning to discharge home to his aunt's house.  Group Attendance: 6/8    REVIEW OF SYSTEMS   Review of systems: no complaints    OBJECTIVE     Vital Signs in Past 24 Hours:  Temp:  [97.5 °F (36.4 °C)-97.8 °F (36.6 °C)] 97.8 °F (36.6 °C)  HR:  [79-85] 79  BP: (119-138)/(65-76) 119/65  Resp:  [18] 18  SpO2:  [98 %-99 %] 99 %  O2 Device: None (Room air)    Intake/Output in Past 24 hours:  No intake/output data recorded.  No intake/output data recorded.        Laboratory Results:   I have personally reviewed all pertinent laboratory/tests results.  Most Recent Labs:   Lab Results   Component Value Date    WBC 9.74 02/04/2025    RBC 5.54 02/04/2025    HGB 12.9 02/04/2025    HCT 43.5 02/04/2025     02/04/2025    RDW 14.1 02/04/2025    NEUTROABS 4.42 02/04/2025    SODIUM 140 10/16/2024    K 3.8 10/16/2024     10/16/2024    CO2 29 10/16/2024    BUN 9 10/16/2024    CREATININE 0.91 10/16/2024    GLUC 94 10/16/2024    GLUF 94 10/16/2024    CALCIUM 9.5 10/16/2024    AST 26 09/30/2024    ALT 42 09/30/2024    ALKPHOS 64 09/30/2024    TP 6.6 09/30/2024    ALB 3.8 09/30/2024    TBILI 0.47 09/30/2024    CHOLESTEROL 156 03/14/2024    HDL 44 03/14/2024    TRIG 133 03/14/2024    LDLCALC 85 03/14/2024    NONHDLC 112 03/14/2024    LITHIUM 0.61 01/09/2024    [id number] 1.062 11/15/2023    HGBA1C 5.0 04/01/2024    EAG 97 04/01/2024       Behavioral Health Medications: all current active meds have been reviewed.    Current Facility-Administered Medications   Medication Dose Route Frequency Provider Last Rate    acetaminophen  650 mg Oral Q4H PRN Jordan C Holter, DO      acetaminophen  650 mg Oral Q6H PRN HOLLI Lion      aluminum-magnesium hydroxide-simethicone  30 mL Oral Q4H PRN Jordan C Holter, DO      amLODIPine  5 mg Oral Daily HOLLI Lion      Artificial Tears  1 drop Both Eyes Q3H PRN Jordan C Holter, DO      haloperidol lactate  2.5 mg Intramuscular Q4H PRN Max 4/day HOLLI Lino      And    LORazepam  1 mg Intramuscular Q4H PRN Max 4/day HOLLI Lion      And    benztropine  0.5 mg Intramuscular Q4H PRN Max 4/day HOLLI Lion      haloperidol lactate  5 mg Intramuscular Q4H PRN Max 4/day HOLLI Lion      And    LORazepam  2 mg Intramuscular Q4H PRN Max 4/day HOLLI Lion      And    benztropine  1 mg Intramuscular Q4H PRN Max 4/day HOLLI Lion      bisacodyl  10 mg Rectal Daily PRN HOLLI Lion      calcium carbonate  500 mg Oral BID  PRN HOLLI Monge      cloZAPine  250 mg Oral HS Bora Rosario MD      hydrOXYzine HCL  50 mg Oral Q6H PRN Max 4/day Jordan C Holter, DO      Or    diphenhydrAMINE  50 mg Intramuscular Q6H PRN Jordan C Holter, DO      hydrOXYzine HCL  50 mg Oral Q6H PRN Max 4/day HOLLI Lion      Or    diphenhydrAMINE  50 mg Intramuscular Q6H PRN HOLLI Lion      diphenhydrAMINE-zinc acetate   Topical BID PRN HOLLI Lion      docusate sodium  100 mg Oral BID Jourdan Dickens, DO      escitalopram  20 mg Oral Daily HOLLI Lion      famotidine  20 mg Oral BID PRN HOLLI Monge      fluticasone  1 spray Each Nare Daily PRN HOLLI Monge      haloperidol  1 mg Oral Q6H PRN HOLLI Lion      haloperidol  2.5 mg Oral Q4H PRN Max 4/day HOLLI Lion      haloperidol  5 mg Oral Q4H PRN Max 4/day HOLLI Lion      haloperidol  7.5 mg Oral BID Jordan C Holter, DO      hydroCHLOROthiazide  12.5 mg Oral Daily HOLLI Galvan      hydrocortisone   Topical 4x Daily PRN HOLLI Lion      hydrOXYzine HCL  100 mg Oral Q6H PRN Max 4/day Jordan C Holter, DO      Or    LORazepam  2 mg Intramuscular Q6H PRN Jordan C Holter, DO      hydrOXYzine HCL  100 mg Oral Q6H PRN Max 4/day HOLLI Lion      Or    LORazepam  2 mg Intramuscular Q6H PRN HOLLI Lion      hydrOXYzine HCL  25 mg Oral Q6H PRN Max 4/day Jordan C Holter, DO      ibuprofen  600 mg Oral Q8H PRN HOLLI Lion      influenza vaccine  0.5 mL Intramuscular Once Bora Rosario MD      loratadine  10 mg Oral Daily Brina Guillen MD      magnesium hydroxide  30 mL Oral Once Jourdan Dickens, DO      melatonin  3 mg Oral HS PRN Bora Rosario MD      melatonin  9 mg Oral HS Bora Rosario MD      methocarbamol  500 mg Oral Q6H PRN HOLLI Lion      multivitamin-minerals  1 tablet Oral Daily HOLLI Monge      nicotine polacrilex  2 mg Oral Q4H PRN Bora DE GUZMAN  "MD Abraham      OLANZapine  5 mg Oral Q4H PRN Max 3/day [facility] Holter, DO      Or    OLANZapine  2.5 mg Intramuscular Q4H PRN Max 3/day [facility] Holter, DO      OLANZapine  5 mg Oral Q3H PRN Max 3/day [facility] Holter, DO      Or    OLANZapine  5 mg Intramuscular Q3H PRN Max 3/day [facility] Holter, DO      OLANZapine  2.5 mg Oral Q4H PRN Max 6/day [facility] Holter, DO      ondansetron  4 mg Oral Q6H PRN HOLLI Lion      pantoprazole  20 mg Oral QAM [facility] Holter, DO      polyethylene glycol  17 g Oral Daily PRN Sofi Yoo, CRNP      polyethylene glycol  17 g Oral Daily Jourdan Dickens, DO      propranolol  10 mg Oral Q12H KAYLIE HOLLI Lion      saliva substitute  5 spray Mouth/Throat 4x Daily PRN Mary Jo Reich PA-C      senna-docusate sodium  1 tablet Oral Daily PRN [facility] Holter, DO      senna-docusate sodium  2 tablet Oral HS Jourdan Dickens, DO      traZODone  50 mg Oral HS PRN HOLLI Lion      white petrolatum-mineral oil   Topical TID PRN HOLLI Lion         Risks, benefits and possible side effects of Medications:   Risks, benefits, and possible side effects of medications explained to patient and patient verbalizes understanding and agreement for treatment.    Dual Antipsychotic Rationale: Patient requires dual antipsychotic therapy due to multiple failed medication trials and failure to control symptoms on a singular agent.     Mental Status Evaluation:  Appearance:  Alert, age appropriate, casually dressed, adequate grooming,fair eye contact, in no acute distress   Behavior:  pleasant, cooperative, calm   Speech:  normal rate and volume, fluent, clear, scant   Mood:  \"Tired\"   Affect:  Constricted   Thought Process:  logical, goal directed, linear, normal rate of thoughts   Associations: concrete associations   Thought Content:  mild paranoia   Perceptual Disturbances: Ongoing auditory hallucinations that threaten to kill him, denies command hallucinations. no visual " hallucinations, does not appear responding to internal stimuli   Risk Potential: Suicidal ideation - Denies  Homicidal ideation - Denies  Potential for aggression - Not at present   Sensorium:  oriented to person, place, and time/date   Memory:  recent and remote memory grossly intact   Consciousness:  alert and awake   Attention/Concentration: attention span and concentration appear shorter than expected for age   Insight:  fair   Judgment: limited   Gait/Station: in bed   Motor Activity: no abnormal movements     Recommended Treatment:   See above for assessment and plan.    Counseling/Coordination of Care    Total unit time spent today was greater than 15 minutes. Greater than 50% of total time was spent with the patient and/or patient's relatives and/or coordination of patient's care.     Patient's rights, confidentiality, exceptions to confidentiality, use of electronic medical record including appropriate staff access to medical record regarding behavioral health services and consent to treatment were reviewed.    Note Share:     This note was not shared with the patient due to reasonable likelihood of causing patient harm     Sharon Pierre   Psychiatry, PA-S

## 2025-02-17 NOTE — ASSESSMENT & PLAN NOTE
Reviewed 2/17/2025  He remains isolative to self and is encouraged to leave his bedroom. Compliant with psychiatric medications but has been refusing his miralax. Continues to report auditory hallucinations to hurt him and his family to nursing staff.  Has not had any aggression or agitation in hospital.    Continue medication as follows:  Continue Clozapine 250 mg QHS for psychosis (previously increased 12/11/24)  02/04: , BNP 10, CRP 9.5  02/04: ANC 4.42  To consider further careful titration  CBC w/diff and CK every 2 weeks  Continue Haldol 7.5 mg BID for psychosis , increased on 2/13/2025  Continue Lexapro 20 mg daily for depression and anxiety  Continue Propanolol 10 mg BID for anxiety   Continue Melatonin 9 mg QHS for sleep  Continue Colace and Senna-Docusate sodium 50 mg tablet QHS for constipation  Continue Zofran 4 mg tablet Q6H PRN for nausea   Continue PRN mouth kote for xerostomia     - Pt remains on dual antipsychotic Tx due to failure on monotherapy   .  - S/p ECT treatments.  - ACT team referral was made for him living back with his aunt once MA funding comes through from the [facility] and sister will try to reapply  No associated orders from this encounter found during lookback period of 72 hours.

## 2025-02-18 LAB
BASOPHILS # BLD AUTO: 0.04 THOUSANDS/ÂΜL (ref 0–0.1)
BASOPHILS NFR BLD AUTO: 1 % (ref 0–1)
BNP SERPL-MCNC: 25 PG/ML (ref 0–100)
CK SERPL-CCNC: 128 U/L (ref 39–308)
CRP SERPL QL: 10 MG/L
EOSINOPHIL # BLD AUTO: 0.36 THOUSAND/ÂΜL (ref 0–0.61)
EOSINOPHIL NFR BLD AUTO: 5 % (ref 0–6)
ERYTHROCYTE [DISTWIDTH] IN BLOOD BY AUTOMATED COUNT: 14 % (ref 11.6–15.1)
HCT VFR BLD AUTO: 45.1 % (ref 36.5–49.3)
HGB BLD-MCNC: 13.4 G/DL (ref 12–17)
IMM GRANULOCYTES # BLD AUTO: 0.03 THOUSAND/UL (ref 0–0.2)
IMM GRANULOCYTES NFR BLD AUTO: 0 % (ref 0–2)
LYMPHOCYTES # BLD AUTO: 2.63 THOUSANDS/ÂΜL (ref 0.6–4.47)
LYMPHOCYTES NFR BLD AUTO: 34 % (ref 14–44)
MCH RBC QN AUTO: 23.1 PG (ref 26.8–34.3)
MCHC RBC AUTO-ENTMCNC: 29.7 G/DL (ref 31.4–37.4)
MCV RBC AUTO: 78 FL (ref 82–98)
MONOCYTES # BLD AUTO: 0.68 THOUSAND/ÂΜL (ref 0.17–1.22)
MONOCYTES NFR BLD AUTO: 9 % (ref 4–12)
NEUTROPHILS # BLD AUTO: 4.08 THOUSANDS/ÂΜL (ref 1.85–7.62)
NEUTS SEG NFR BLD AUTO: 51 % (ref 43–75)
NRBC BLD AUTO-RTO: 0 /100 WBCS
PLATELET # BLD AUTO: 251 THOUSANDS/UL (ref 149–390)
PMV BLD AUTO: 10.3 FL (ref 8.9–12.7)
RBC # BLD AUTO: 5.8 MILLION/UL (ref 3.88–5.62)
WBC # BLD AUTO: 7.82 THOUSAND/UL (ref 4.31–10.16)

## 2025-02-18 PROCEDURE — 86140 C-REACTIVE PROTEIN: CPT | Performed by: PSYCHIATRY & NEUROLOGY

## 2025-02-18 PROCEDURE — 82550 ASSAY OF CK (CPK): CPT | Performed by: PSYCHIATRY & NEUROLOGY

## 2025-02-18 PROCEDURE — 99232 SBSQ HOSP IP/OBS MODERATE 35: CPT | Performed by: PSYCHIATRY & NEUROLOGY

## 2025-02-18 PROCEDURE — 83880 ASSAY OF NATRIURETIC PEPTIDE: CPT | Performed by: PSYCHIATRY & NEUROLOGY

## 2025-02-18 PROCEDURE — 85025 COMPLETE CBC W/AUTO DIFF WBC: CPT | Performed by: PSYCHIATRY & NEUROLOGY

## 2025-02-18 RX ADMIN — MULTIPLE VITAMINS W/ MINERALS TAB 1 TABLET: TAB ORAL at 08:36

## 2025-02-18 RX ADMIN — HALOPERIDOL 7.5 MG: 5 TABLET ORAL at 17:15

## 2025-02-18 RX ADMIN — DOCUSATE SODIUM 100 MG: 100 CAPSULE, LIQUID FILLED ORAL at 17:15

## 2025-02-18 RX ADMIN — NICOTINE POLACRILEX 2 MG: 2 GUM, CHEWING ORAL at 08:36

## 2025-02-18 RX ADMIN — SENNOSIDES AND DOCUSATE SODIUM 2 TABLET: 50; 8.6 TABLET ORAL at 21:21

## 2025-02-18 RX ADMIN — NICOTINE POLACRILEX 2 MG: 2 GUM, CHEWING ORAL at 17:37

## 2025-02-18 RX ADMIN — DOCUSATE SODIUM 100 MG: 100 CAPSULE, LIQUID FILLED ORAL at 08:36

## 2025-02-18 RX ADMIN — NICOTINE POLACRILEX 2 MG: 2 GUM, CHEWING ORAL at 12:46

## 2025-02-18 RX ADMIN — PROPRANOLOL HYDROCHLORIDE 10 MG: 10 TABLET ORAL at 08:35

## 2025-02-18 RX ADMIN — NICOTINE POLACRILEX 2 MG: 2 GUM, CHEWING ORAL at 21:21

## 2025-02-18 RX ADMIN — CLOZAPINE 250 MG: 25 TABLET ORAL at 21:21

## 2025-02-18 RX ADMIN — HALOPERIDOL 7.5 MG: 5 TABLET ORAL at 08:34

## 2025-02-18 RX ADMIN — LORATADINE 10 MG: 10 TABLET ORAL at 08:35

## 2025-02-18 RX ADMIN — ESCITALOPRAM OXALATE 20 MG: 10 TABLET, FILM COATED ORAL at 08:33

## 2025-02-18 RX ADMIN — PROPRANOLOL HYDROCHLORIDE 10 MG: 10 TABLET ORAL at 21:21

## 2025-02-18 RX ADMIN — MELATONIN TAB 3 MG 9 MG: 3 TAB at 21:21

## 2025-02-18 RX ADMIN — HYDROCHLOROTHIAZIDE 12.5 MG: 12.5 TABLET ORAL at 08:34

## 2025-02-18 RX ADMIN — PANTOPRAZOLE SODIUM 20 MG: 20 TABLET, DELAYED RELEASE ORAL at 08:34

## 2025-02-18 NOTE — PROGRESS NOTES
"Progress Note - Behavioral Health   Name: Alberto Berumen 29 y.o. male I MRN: [id number]  Unit/Bed#: [facility] 112-02 I Date of Admission: 3/29/2024   Date of Service: 2/18/2025 I Hospital Day: 326     Assessment & Plan  Schizoaffective disorder, bipolar type (HCC)  Reviewed 2/18/2025  He remains isolative to self and is encouraged to leave his bedroom. States he has feelings of hopelessness given his condition. Compliant with psychiatric medications but has been refusing his miralax. Reports medication side effects such as \"tics.\" Continues to report auditory hallucinations threatening to kill him/family.     Continue medication as follows:  Continue Clozapine 250 mg QHS for psychosis (previously increased 12/11/24)  02/18: , BNP 25, CRP 10, ANC 4.08  02/04:, BNP 10, CRP 9.5,ANC 4.42  To consider further careful titration  CBC w/diff and CK every 2 weeks  Continue Haldol 7.5 mg BID for psychosis , increased on 2/13/2025  Continue Lexapro 20 mg daily for depression and anxiety  Continue Propanolol 10 mg BID for anxiety   Continue Melatonin 9 mg QHS for sleep  Continue Colace and Senna-Docusate sodium 50 mg tablet QHS for constipation  Continue Zofran 4 mg tablet Q6H PRN for nausea   Continue PRN mouth kote for xerostomia     - Pt remains on dual antipsychotic Tx due to failure on monotherapy   .  - S/p ECT treatments.  - ACT team referral was made for him living back with his aunt once MA funding comes through from the [facility] and sister will try to reapply  No associated orders from this encounter found during lookback period of 72 hours.    Chronic idiopathic constipation  Reviewed 2/18/2025  Follows with medical team  Continue senna-docusate sodium dose per medical team.   Continue with regular bowel movements, refused miralax this AM.  No associated orders from this encounter found during lookback period of 72 hours.  GERD (gastroesophageal reflux disease)  Review 2/18/2025  Follows with " "medical  Continue famotidine 20 mg tablet BID per medical team   Continue pantoprazole EC 20 mg tablet every morning per medical team  This is a chronic condition, and is stable unless otherwise noted.  No associated orders from this encounter found during lookback period of 72 hours.  Tobacco abuse  Reviewed 2/18/2025  On nicotine gum as needed and encouraged smoking cessation  Continue to encourage cessation upon discharge  No associated orders from this encounter found during lookback period of 72 hours.  Elevated hemoglobin A1c  Reviewed to 2/18/25  Follows with medical team  No associated orders from this encounter found during lookback period of 72 hours.  Class 2 obesity in adult  Reviewed  2/18/25  Follows with medical team and given dietary counseling and need for exercise, most recent BMI 39.09 on 2/16/2025  No associated orders from this encounter found during lookback period of 72 hours.    Primary hypertension  Reviewed 2/18/2025  Follows with medical team on hydrochlorothiazide and Norvasc  Blood pressure remains stable at this time.  No associated orders from this encounter found during lookback period of 72 hours.    Plan:  Continue prescribed psychotropic medication regimen  Currently on 201 commitment status  Continue to promote patient participation in therapeutic milieu.  Continue medical management per medicine.  Continue behavioral health checks q.15 minutes.   Continue vitals per behavioral health unit protocol.  Safety: Safety and communication plan established to target dynamic risk factors discussed above.   Discharge and disposition:Treatment team today, ongoing discussion regarding the need for ACT services to be in place prior to discharging to patient's aunt's house.    SUBJECTIVE     Patient evaluated this a.m. for continuity of care. Today,  Alberto feels \"tired.\" He remained under his blanket at the time of interview. He states he is feeling more \"hopeless\" and anxious than normal " "due to his ongoing struggle with his auditory hallucinations. These hallucinations remain fixed in that both female and male voices continue to threaten to kill him. He feels paranoid in this context but remains engaged in his treatment. He states getting out of bed, exercising, and going to groups have been helpful in lessening his auditory hallucinations. He denies command hallucinations or visual hallucinations. Denies suicidal/homicidal ideation at this time.     Alberto voices his medications have been making him have \"tics\" lately during his treatment team meeting. Continues to endorse increased fatigue as well. Otherwise remains compliant with his psychiatric medications and has not needed any PRN medications in the last 24 hours. He is having regular bowel movements without difficulty and has been refusing his miralax. He is eating 2/3 meals and is sleeping normally. Nursing reports no acute behaviors and that he is social with select peers. Alberto states skills he is working on include patience and consistency as well as maintaining personal hygiene.  PSYCHIATRIC REVIEW OF SYSTEMS     Behavior over the last 24 hours:  unchanged  Sleep: normal  Appetite: normal  Medication side effects: Yes, tics      Progress Toward Goals: Limited progress, as evidenced by their participation (or lack thereof) in individual, social and therapeutic milieu in addition to adherence to their medication regimen.  Patient Acceptance: Fair acceptance   Patient Understanding: Fair  Patient Involvement in Reintegration Process: Remains in contact with sister and aunt, last phone call to sister was yesterday.   Patient's Therapeutic Relationship with Psychiatrist: Trusting  Patient's Optimism Regarding Recovery: Treatment team today, ongoing discussion regarding the need for ACT services to be in place prior to discharging to patient's aunt's house. Remains engaged in his treatment plan.  Group Attendance: 5/9    REVIEW OF SYSTEMS "   Review of systems: no complaints    OBJECTIVE     Vital Signs in Past 24 Hours:  Temp:  [97.5 °F (36.4 °C)-97.6 °F (36.4 °C)] 97.6 °F (36.4 °C)  HR:  [80-86] 80  BP: (102-129)/(55-76) 117/72  Resp:  [18] 18  SpO2:  [98 %-99 %] 99 %  O2 Device: None (Room air)    Intake/Output in Past 24 hours:  No intake/output data recorded.  No intake/output data recorded.        Laboratory Results:  I have personally reviewed all pertinent laboratory/tests results.  Most Recent Labs:   Lab Results   Component Value Date    WBC 7.82 02/18/2025    RBC 5.80 (H) 02/18/2025    HGB 13.4 02/18/2025    HCT 45.1 02/18/2025     02/18/2025    RDW 14.0 02/18/2025    NEUTROABS 4.08 02/18/2025    SODIUM 140 10/16/2024    K 3.8 10/16/2024     10/16/2024    CO2 29 10/16/2024    BUN 9 10/16/2024    CREATININE 0.91 10/16/2024    GLUC 94 10/16/2024    GLUF 94 10/16/2024    CALCIUM 9.5 10/16/2024    AST 26 09/30/2024    ALT 42 09/30/2024    ALKPHOS 64 09/30/2024    TP 6.6 09/30/2024    ALB 3.8 09/30/2024    TBILI 0.47 09/30/2024    CHOLESTEROL 156 03/14/2024    HDL 44 03/14/2024    TRIG 133 03/14/2024    LDLCALC 85 03/14/2024    NONHDLC 112 03/14/2024    LITHIUM 0.61 01/09/2024    [id number] 1.062 11/15/2023    HGBA1C 5.0 04/01/2024    EAG 97 04/01/2024       Behavioral Health Medications: all current active meds have been reviewed.    Current Facility-Administered Medications   Medication Dose Route Frequency Provider Last Rate    acetaminophen  650 mg Oral Q4H PRN Jordan C Holter, DO      acetaminophen  650 mg Oral Q6H PRN Eveline Hangey, CRNP      aluminum-magnesium hydroxide-simethicone  30 mL Oral Q4H PRN Jordan C Holter,       amLODIPine  5 mg Oral Daily HOLLI Lion      Artificial Tears  1 drop Both Eyes Q3H PRN Jordan C Holter, DO      haloperidol lactate  2.5 mg Intramuscular Q4H PRN Max 4/day HOLLI Lion      And    LORazepam  1 mg Intramuscular Q4H PRN Max 4/day HOLLI Lion      And    benztropine   0.5 mg Intramuscular Q4H PRN Max 4/day HOLLI Lion      haloperidol lactate  5 mg Intramuscular Q4H PRN Max 4/day HOLLI Lion      And    LORazepam  2 mg Intramuscular Q4H PRN Max 4/day HOLLI Lion      And    benztropine  1 mg Intramuscular Q4H PRN Max 4/day HOLLI Lion      bisacodyl  10 mg Rectal Daily PRN HOLLI Lion      calcium carbonate  500 mg Oral BID PRN HOLLI Monge      cloZAPine  250 mg Oral HS Bora Rosario MD      hydrOXYzine HCL  50 mg Oral Q6H PRN Max 4/day Jordan C Holter, DO      Or    diphenhydrAMINE  50 mg Intramuscular Q6H PRN Jordan C Holter, DO      hydrOXYzine HCL  50 mg Oral Q6H PRN Max 4/day HOLLI Lion      Or    diphenhydrAMINE  50 mg Intramuscular Q6H PRN HOLLI Lion      diphenhydrAMINE-zinc acetate   Topical BID PRN HOLLI Lion      docusate sodium  100 mg Oral BID Jourdan Dickens, DO      escitalopram  20 mg Oral Daily HOLLI Lion      famotidine  20 mg Oral BID PRN HOLLI Monge      fluticasone  1 spray Each Nare Daily PRN HOLLI Monge      haloperidol  1 mg Oral Q6H PRN HOLLI Lion      haloperidol  2.5 mg Oral Q4H PRN Max 4/day HOLLI Lion      haloperidol  5 mg Oral Q4H PRN Max 4/day HOLLI Lion      haloperidol  7.5 mg Oral BID Jordan C Holter, DO      hydroCHLOROthiazide  12.5 mg Oral Daily Pia Mantilla, HOLLI      hydrocortisone   Topical 4x Daily PRN HOLLI Lion      hydrOXYzine HCL  100 mg Oral Q6H PRN Max 4/day Jordan C Holter, DO      Or    LORazepam  2 mg Intramuscular Q6H PRN Jordan C Holter, DO      hydrOXYzine HCL  100 mg Oral Q6H PRN Max 4/day HOLLI Lion      Or    LORazepam  2 mg Intramuscular Q6H PRN HOLLI Lion      hydrOXYzine HCL  25 mg Oral Q6H PRN Max 4/day Jordan C Holter, DO      ibuprofen  600 mg Oral Q8H PRN HOLLI Lion      influenza vaccine  0.5 mL Intramuscular Once Bora Rosario MD       loratadine  10 mg Oral Daily Brina Guillen MD      magnesium hydroxide  30 mL Oral Once Jourdan Dickens, DO      melatonin  3 mg Oral HS PRN Bora Rosario MD      melatonin  9 mg Oral HS Bora Rosario MD      methocarbamol  500 mg Oral Q6H PRN HOLLI Lion      multivitamin-minerals  1 tablet Oral Daily Eileen Gonzaleznathan, HOLLI      nicotine polacrilex  2 mg Oral Q4H PRN Bora Rosario MD      OLANZapine  5 mg Oral Q4H PRN Max 3/day [facility] Holter, DO      Or    OLANZapine  2.5 mg Intramuscular Q4H PRN Max 3/day [facility] Holter, DO      OLANZapine  5 mg Oral Q3H PRN Max 3/day [facility] Holter, DO      Or    OLANZapine  5 mg Intramuscular Q3H PRN Max 3/day [facility] Holter, DO      OLANZapine  2.5 mg Oral Q4H PRN Max 6/day [facility] Holter, DO      ondansetron  4 mg Oral Q6H PRN HOLLI Lion      [facility]      polyethylene glycol  17 g Oral Daily PRN HOLLI Ghotra      polyethylene glycol  17 g Oral Daily Jourdan Dickens, DO      propranolol  10 mg Oral Q12H KAYLIE HOLLI Lion      saliva substitute  5 spray Mouth/Throat 4x Daily PRN Mary Jo Reich PA-C      senna-docusate sodium  1 tablet Oral Daily PRN [facility] Holter, DO      senna-docusate sodium  2 tablet Oral HS Jourdan Dickens, DO      traZODone  50 mg Oral HS PRN HOLLI Lion      white petrolatum-mineral oil   Topical TID PRN HOLLI Lion         Risks, benefits and possible side effects of Medications:   Risks, benefits, and possible side effects of medications explained to patient and patient verbalizes understanding and agreement for treatment.    Dual Antipsychotic Rationale: Patient requires dual antipsychotic therapy due to multiple failed medication trials and inability to control symptoms on a single agent.    Mental Status Evaluation:  Appearance:  Alert, age appropriate, casually dressed, marginal hygiene, in no acute distress   Behavior:  pleasant, cooperative, calm,  "limited eye contact   Speech:  normal rate and volume, fluent, clear, scant   Mood:  \"Tired\"   Affect:  blunted, depressed   Thought Process:  organized, linear, normal rate of thoughts   Associations: concrete associations   Thought Content:  some paranoia   Perceptual Disturbances: Ongoing, constant auditory hallucinations which threaten to kill him,no visual hallucinations, does not appear responding to internal stimuli   Risk Potential: Suicidal ideation - Denies  Homicidal ideation - Denies  Potential for aggression - Not at present   Sensorium:  oriented to person, place, and time/date   Memory:  recent and remote memory grossly intact   Consciousness:  alert and awake   Attention/Concentration: attention span and concentration appear shorter than expected for age   Insight:  limited   Judgment: limited   Gait/Station: in bed   Motor Activity: no abnormal movements     Recommended Treatment:   See above for assessment and plan.    Counseling/Coordination of Care    Total unit time spent today was greater than 15 minutes. Greater than 50% of total time was spent with the patient and/or patient's relatives and/or coordination of patient's care.     Patient's rights, confidentiality, exceptions to confidentiality, use of electronic medical record including appropriate staff access to medical record regarding behavioral health services and consent to treatment were reviewed.    Note Share:     This note was not shared with the patient due to reasonable likelihood of causing patient harm     Sharon Lilly, PA-S  "

## 2025-02-18 NOTE — ASSESSMENT & PLAN NOTE
"Reviewed 2/18/2025  He remains isolative to self and is encouraged to leave his bedroom. States he has feelings of hopelessness given his condition. Compliant with psychiatric medications but has been refusing his miralax. Reports medication side effects such as \"tics.\" Continues to report auditory hallucinations threatening to kill him/family.     Continue medication as follows:  Continue Clozapine 250 mg QHS for psychosis (previously increased 12/11/24)  02/18: , BNP 25, CRP 10, ANC 4.08  02/04:, BNP 10, CRP 9.5,ANC 4.42  To consider further careful titration  CBC w/diff and CK every 2 weeks  Continue Haldol 7.5 mg BID for psychosis , increased on 2/13/2025  Continue Lexapro 20 mg daily for depression and anxiety  Continue Propanolol 10 mg BID for anxiety   Continue Melatonin 9 mg QHS for sleep  Continue Colace and Senna-Docusate sodium 50 mg tablet QHS for constipation  Continue Zofran 4 mg tablet Q6H PRN for nausea   Continue PRN mouth kote for xerostomia     - Pt remains on dual antipsychotic Tx due to failure on monotherapy   .  - S/p ECT treatments.  - ACT team referral was made for him living back with his aunt once MA funding comes through from the [facility] and sister will try to reapply  No associated orders from this encounter found during lookback period of 72 hours.    "

## 2025-02-18 NOTE — PLAN OF CARE
Problem: Ineffective Coping  Goal: Identifies ineffective coping skills  Outcome: Progressing  Goal: Identifies healthy coping skills  Outcome: Progressing  Goal: Demonstrates healthy coping skills  Outcome: Progressing  Goal: Participates in unit activities  Description: Interventions:  - Provide therapeutic environment   - Provide required programming   - Redirect inappropriate behaviors   Outcome: Progressing   He in attending and engaged in the groups.

## 2025-02-18 NOTE — PROGRESS NOTES
02/18/25 1032   Team Meeting   Meeting Type Tx Team Meeting   Initial Conference Date 02/18/25   Next Conference Date 03/03/25   Team Members Present   Team Members Present Physician;Nurse;;Other (Discipline and Name)   Physician Team Member Holter   Nursing Team Member Claudia   Social Work Team Member Jose J ORTEGA   Other (Discipline and Name) Gaby CMP-MHDS   Patient/Family Present   Patient Present Yes   Patient's Family Present No   OTHER   Team Meeting - Additional Comments Patient attended his treatment team meeting. Patient shared his completed self assessment. Patient discussed increased depression. This writer discussed with stacy, micheal from [facility] regarding his referral. This writer reiterated patient's conitnued admission due to not having ACT services and requested that the patient's a priority for the county, as he's ready for discharge and his ACT services is the barreir from his admission.

## 2025-02-18 NOTE — ASSESSMENT & PLAN NOTE
Reviewed  2/18/25  Follows with medical team and given dietary counseling and need for exercise, most recent BMI 39.09 on 2/16/2025  No associated orders from this encounter found during lookback period of 72 hours.

## 2025-02-18 NOTE — NURSING NOTE
Maintained on ongoing SAFE precaution without incident on this shift.  Awake, alert,pleasant and cooperative. Isolative on this shift, laying in his room in the dark.  Still struggling with the same auditory hallucination,  did not use any coping skill.  Encourage to get out of his room and be proactive, tonight he decline .  Attended and participated in 5 out of 9 group today.  Continues to be compliant with medication regimen.  Behavior intact

## 2025-02-18 NOTE — ASSESSMENT & PLAN NOTE
Reviewed 2/18/2025  Follows with medical team  Continue senna-docusate sodium dose per medical team.   Continue with regular bowel movements, refused miralax this AM.  No associated orders from this encounter found during lookback period of 72 hours.

## 2025-02-18 NOTE — PLAN OF CARE
Problem: Alteration in Thoughts and Perception  Goal: Refrain from acting on delusional thinking/internal stimuli  Description: Interventions:  - Monitor patient closely, per order   - Utilize least restrictive measures   - Set reasonable limits, give positive feedback for acceptable   - Administer medications as ordered and monitor of potential side effects  Outcome: Progressing  Goal: Agree to be compliant with medication regime, as prescribed and report medication side effects  Description: Interventions:  - Offer appropriate PRN medication and supervise ingestion; conduct AIMS, as needed   Outcome: Progressing     Problem: Alteration in Orientation  Goal: Interact with staff daily  Description: Interventions:  - Assess and re-assess patient's level of orientation  - Engage patient in 1 on 1 interactions, daily, for a minimum of 15 minutes   - Establish rapport/trust with patient   Outcome: Progressing     Problem: Alteration in Thoughts and Perception  Goal: Treatment Goal: Gain control of psychotic behaviors/thinking, reduce/eliminate presenting symptoms and demonstrate improved reality functioning upon discharge  Outcome: Not Progressing     Problem: Ineffective Coping  Goal: Identifies ineffective coping skills  Outcome: Not Progressing  Goal: Identifies healthy coping skills  Outcome: Not Progressing  Goal: Demonstrates healthy coping skills  Outcome: Not Progressing     Problem: Depression  Goal: Treatment Goal: Demonstrate behavioral control of depressive symptoms, verbalize feelings of improved mood/affect, and adopt new coping skills prior to discharge  Outcome: Not Progressing  Goal: Refrain from isolation  Description: Interventions:  - Develop a trusting relationship   - Encourage socialization   Outcome: Not Progressing     Problem: Anxiety  Goal: Anxiety is at manageable level  Description: Interventions:  - Assess and monitor patient's anxiety level.   - Monitor for signs and symptoms (heart  palpitations, chest pain, shortness of breath, headaches, nausea, feeling jumpy, restlessness, irritable, apprehensive).   - Collaborate with interdisciplinary team and initiate plan and interventions as ordered.  - [place] patient to unit/surroundings  - Explain treatment plan  - Encourage participation in care  - Encourage verbalization of concerns/fears  - Identify coping mechanisms  - Assist in developing anxiety-reducing skills  - Administer/offer alternative therapies  - Limit or eliminate stimulants  Outcome: Not Progressing

## 2025-02-18 NOTE — ASSESSMENT & PLAN NOTE
Reviewed to 2/18/25  Follows with medical team  No associated orders from this encounter found during lookback period of 72 hours.

## 2025-02-18 NOTE — ASSESSMENT & PLAN NOTE
Review 2/18/2025  Follows with medical  Continue famotidine 20 mg tablet BID per medical team   Continue pantoprazole EC 20 mg tablet every morning per medical team  This is a chronic condition, and is stable unless otherwise noted.  No associated orders from this encounter found during lookback period of 72 hours.

## 2025-02-18 NOTE — PROGRESS NOTES
02/18/25 0746   Team Meeting   Meeting Type Daily Rounds   Team Members Present   Team Members Present Physician;Nurse;;Other (Discipline and Name)   Physician Team Member Holter   Nursing Team Member Claudia   Social Work Team Member Jose J ORTEGA   Other (Discipline and Name) Wang PCM   Patient/Family Present   Patient Present No   Patient's Family Present No     Groups Participation 5/9.   Patient's compliant with medications. He's engaged in his treatment. He's appropriate and social with select peers. Isolated in the evening. Reports AH.

## 2025-02-18 NOTE — ASSESSMENT & PLAN NOTE
Reviewed 2/18/2025  Follows with medical team on hydrochlorothiazide and Norvasc  Blood pressure remains stable at this time.  No associated orders from this encounter found during lookback period of 72 hours.

## 2025-02-18 NOTE — NURSING NOTE
Patient is visible and social with staff and peers. Pt reports no s/s, takes medication without issue. Pt with requests for nicotine gum but otherwise minimal needs.

## 2025-02-18 NOTE — ASSESSMENT & PLAN NOTE
Reviewed 2/18/2025  On nicotine gum as needed and encouraged smoking cessation  Continue to encourage cessation upon discharge  No associated orders from this encounter found during lookback period of 72 hours.

## 2025-02-19 PROCEDURE — 99232 SBSQ HOSP IP/OBS MODERATE 35: CPT | Performed by: PSYCHIATRY & NEUROLOGY

## 2025-02-19 RX ORDER — BENZTROPINE MESYLATE 0.5 MG/1
0.5 TABLET ORAL 2 TIMES DAILY
Status: DISCONTINUED | OUTPATIENT
Start: 2025-02-19 | End: 2025-03-14 | Stop reason: HOSPADM

## 2025-02-19 RX ADMIN — SENNOSIDES AND DOCUSATE SODIUM 2 TABLET: 50; 8.6 TABLET ORAL at 21:24

## 2025-02-19 RX ADMIN — HYDROCHLOROTHIAZIDE 12.5 MG: 12.5 TABLET ORAL at 08:58

## 2025-02-19 RX ADMIN — ESCITALOPRAM OXALATE 20 MG: 10 TABLET, FILM COATED ORAL at 08:58

## 2025-02-19 RX ADMIN — BENZTROPINE MESYLATE 0.5 MG: 0.5 TABLET ORAL at 12:39

## 2025-02-19 RX ADMIN — PANTOPRAZOLE SODIUM 20 MG: 20 TABLET, DELAYED RELEASE ORAL at 08:57

## 2025-02-19 RX ADMIN — MELATONIN TAB 3 MG 9 MG: 3 TAB at 21:24

## 2025-02-19 RX ADMIN — HALOPERIDOL 7.5 MG: 5 TABLET ORAL at 17:25

## 2025-02-19 RX ADMIN — NICOTINE POLACRILEX 2 MG: 2 GUM, CHEWING ORAL at 12:39

## 2025-02-19 RX ADMIN — LORATADINE 10 MG: 10 TABLET ORAL at 08:58

## 2025-02-19 RX ADMIN — PROPRANOLOL HYDROCHLORIDE 10 MG: 10 TABLET ORAL at 08:58

## 2025-02-19 RX ADMIN — PROPRANOLOL HYDROCHLORIDE 10 MG: 10 TABLET ORAL at 21:25

## 2025-02-19 RX ADMIN — HALOPERIDOL 7.5 MG: 5 TABLET ORAL at 08:58

## 2025-02-19 RX ADMIN — NICOTINE POLACRILEX 2 MG: 2 GUM, CHEWING ORAL at 08:57

## 2025-02-19 RX ADMIN — DOCUSATE SODIUM 100 MG: 100 CAPSULE, LIQUID FILLED ORAL at 08:58

## 2025-02-19 RX ADMIN — MULTIPLE VITAMINS W/ MINERALS TAB 1 TABLET: TAB ORAL at 08:58

## 2025-02-19 RX ADMIN — NICOTINE POLACRILEX 2 MG: 2 GUM, CHEWING ORAL at 17:24

## 2025-02-19 RX ADMIN — CLOZAPINE 250 MG: 25 TABLET ORAL at 21:25

## 2025-02-19 RX ADMIN — NICOTINE POLACRILEX 2 MG: 2 GUM, CHEWING ORAL at 21:25

## 2025-02-19 RX ADMIN — BENZTROPINE MESYLATE 0.5 MG: 0.5 TABLET ORAL at 17:25

## 2025-02-19 RX ADMIN — DOCUSATE SODIUM 100 MG: 100 CAPSULE, LIQUID FILLED ORAL at 17:25

## 2025-02-19 NOTE — ASSESSMENT & PLAN NOTE
Reviewed 2/19/2025  Endorses increased depression. Continues to report auditory hallucinations threatening to kill him/family but can recognize these hallucinations are not based in reality. Compliant with psychiatric medications but has been refusing his miralax. Reports medication side effects such as whole body spasms since starting haldol.     Continue medication as follows:  Continue Clozapine 250 mg QHS for psychosis (previously increased 12/11/24)  02/18: , BNP 25, CRP 10, ANC 4.08  02/04:, BNP 10, CRP 9.5,ANC 4.42  To consider further careful titration  CBC w/diff and CK every 2 weeks  Continue Haldol 7.5 mg BID for psychosis , increased on 2/13/2025  Continue Lexapro 20 mg daily for depression and anxiety  Continue Propanolol 10 mg BID for anxiety   Continue Melatonin 9 mg QHS for sleep  Continue Colace and Senna-Docusate sodium 50 mg tablet QHS for constipation  Continue Zofran 4 mg tablet Q6H PRN for nausea   Continue PRN mouth kote for xerostomia     - Pt remains on dual antipsychotic Tx due to failure on monotherapy   .  - S/p ECT treatments.  - ACT team referral was made for him living back with his aunt once MA funding comes through from the [facility] and sister will try to reapply  No associated orders from this encounter found during lookback period of 72 hours.

## 2025-02-19 NOTE — ASSESSMENT & PLAN NOTE
Reviewed 2/19/2025  Follows with medical team  Continue senna-docusate sodium dose per medical team.   Continue with regular bowel movements, refused miralax this AM.  No associated orders from this encounter found during lookback period of 72 hours.

## 2025-02-19 NOTE — PROGRESS NOTES
02/19/25 0829   Team Meeting   Meeting Type Daily Rounds   Team Members Present   Team Members Present Physician;Nurse;;Other (Discipline and Name)   Physician Team Member Holter   Nursing Team Member Claudia   Social Work Team Member Jose J ORTEGA   Other (Discipline and Name) Wang PCM   Patient/Family Present   Patient Present No   Patient's Family Present No     Groups Participation  9/11.   He's compliant with medications. He's engaged in his treatment. He's social with his peers. Awaiting [facility] ACT services to discharge home.

## 2025-02-19 NOTE — ASSESSMENT & PLAN NOTE
Reviewed to 2/19/25  Follows with medical team  No associated orders from this encounter found during lookback period of 72 hours.

## 2025-02-19 NOTE — ASSESSMENT & PLAN NOTE
Reviewed 2/19/2025  Follows with medical team on hydrochlorothiazide and Norvasc  Blood pressure remains stable at this time.  No associated orders from this encounter found during lookback period of 72 hours.

## 2025-02-19 NOTE — NURSING NOTE
Pt is visible on the unit and social with select peers. Took medications without incidence. Pt is polite and cooperative. Attended 9/11 groups. Reports anxiety, depression, and worsened AHs that threaten to hurt him and his family. Denies SI/HI/VH. No behavioral issues. Pt offers no new complaints.

## 2025-02-19 NOTE — NURSING NOTE
Pt in bed asleep, observed eyes closed, and chest movement noted. 15 minute safety checks maintained. No unmet needs at this time. Will continue to monitor patient needs, sleep pattern, and behaviors.     0634: Patient has slept all night, 7+ hours of uninterrupted sleep

## 2025-02-19 NOTE — PLAN OF CARE
Problem: Alteration in Thoughts and Perception  Goal: Refrain from acting on delusional thinking/internal stimuli  Description: Interventions:  - Monitor patient closely, per order   - Utilize least restrictive measures   - Set reasonable limits, give positive feedback for acceptable   - Administer medications as ordered and monitor of potential side effects  Outcome: Progressing  Goal: Agree to be compliant with medication regime, as prescribed and report medication side effects  Description: Interventions:  - Offer appropriate PRN medication and supervise ingestion; conduct AIMS, as needed   Outcome: Progressing     Problem: Ineffective Coping  Goal: Identifies ineffective coping skills  Outcome: Progressing  Goal: Identifies healthy coping skills  Outcome: Progressing  Goal: Demonstrates healthy coping skills  Outcome: Progressing  Goal: Participates in unit activities  Description: Interventions:  - Provide therapeutic environment   - Provide required programming   - Redirect inappropriate behaviors   Outcome: Progressing     Problem: Depression  Goal: Verbalize thoughts and feelings  Description: Interventions:  - Assess and re-assess patient's level of risk   - Engage patient in 1:1 interactions, daily, for a minimum of 15 minutes   - Encourage patient to express feelings, fears, frustrations, hopes   Outcome: Progressing  Goal: Refrain from harming self  Description: Interventions:  - Monitor patient closely, per order   - Supervise medication ingestion, monitor effects and side effects   Outcome: Progressing  Goal: Refrain from isolation  Description: Interventions:  - Develop a trusting relationship   - Encourage socialization   Outcome: Progressing  Goal: Refrain from self-neglect  Outcome: Progressing     Problem: Anxiety  Goal: Anxiety is at manageable level  Description: Interventions:  - Assess and monitor patient's anxiety level.   - Monitor for signs and symptoms (heart palpitations, chest pain,  shortness of breath, headaches, nausea, feeling jumpy, restlessness, irritable, apprehensive).   - Collaborate with interdisciplinary team and initiate plan and interventions as ordered.  - [place] patient to unit/surroundings  - Explain treatment plan  - Encourage participation in care  - Encourage verbalization of concerns/fears  - Identify coping mechanisms  - Assist in developing anxiety-reducing skills  - Administer/offer alternative therapies  - Limit or eliminate stimulants  Outcome: Progressing     Problem: Alteration in Orientation  Goal: Complete daily ADLs, including personal hygiene independently, as able  Description: Interventions:  - Observe, teach, and assist patient with ADLS  Outcome: Progressing

## 2025-02-19 NOTE — ASSESSMENT & PLAN NOTE
Reviewed  2/19/25  Follows with medical team and given dietary counseling and need for exercise, most recent BMI 39.09 on 2/16/2025  No associated orders from this encounter found during lookback period of 72 hours.

## 2025-02-19 NOTE — PROGRESS NOTES
Progress Note - Behavioral Health   Name: Alberto Berumen 29 y.o. male I MRN: [id number]  Unit/Bed#: [facility] 112-02 I Date of Admission: 3/29/2024   Date of Service: 2/19/2025 I Hospital Day: 327     Assessment & Plan  Schizoaffective disorder, bipolar type (HCC)  Reviewed 2/19/2025  Endorses increased depression. Continues to report auditory hallucinations threatening to kill him/family but can recognize these hallucinations are not based in reality. Compliant with psychiatric medications but has been refusing his miralax. Reports medication side effects such as whole body spasms since starting haldol.     Continue medication as follows:  Continue Clozapine 250 mg QHS for psychosis (previously increased 12/11/24)  02/18: , BNP 25, CRP 10, ANC 4.08  02/04:, BNP 10, CRP 9.5,ANC 4.42  To consider further careful titration  CBC w/diff and CK every 2 weeks  Continue Haldol 7.5 mg BID for psychosis , increased on 2/13/2025  Continue Lexapro 20 mg daily for depression and anxiety  Continue Propanolol 10 mg BID for anxiety   Continue Melatonin 9 mg QHS for sleep  Continue Colace and Senna-Docusate sodium 50 mg tablet QHS for constipation  Continue Zofran 4 mg tablet Q6H PRN for nausea   Continue PRN mouth kote for xerostomia     - Pt remains on dual antipsychotic Tx due to failure on monotherapy   .  - S/p ECT treatments.  - ACT team referral was made for him living back with his aunt once MA funding comes through from the [facility] and sister will try to reapply  No associated orders from this encounter found during lookback period of 72 hours.    Chronic idiopathic constipation  Reviewed 2/19/2025  Follows with medical team  Continue senna-docusate sodium dose per medical team.   Continue with regular bowel movements, refused miralax this AM.  No associated orders from this encounter found during lookback period of 72 hours.  GERD (gastroesophageal reflux disease)  Review 2/19/2025  Follows with  "medical  Continue famotidine 20 mg tablet BID per medical team   Continue pantoprazole EC 20 mg tablet every morning per medical team  This is a chronic condition, and is stable unless otherwise noted.  No associated orders from this encounter found during lookback period of 72 hours.  Tobacco abuse  Reviewed 2/19/2025  On nicotine gum as needed and encouraged smoking cessation  Continue to encourage cessation upon discharge  No associated orders from this encounter found during lookback period of 72 hours.  Elevated hemoglobin A1c  Reviewed to 2/19/25  Follows with medical team  No associated orders from this encounter found during lookback period of 72 hours.  Class 2 obesity in adult  Reviewed  2/19/25  Follows with medical team and given dietary counseling and need for exercise, most recent BMI 39.09 on 2/16/2025  No associated orders from this encounter found during lookback period of 72 hours.    Primary hypertension  Reviewed 2/19/2025  Follows with medical team on hydrochlorothiazide and Norvasc  Blood pressure remains stable at this time.  No associated orders from this encounter found during lookback period of 72 hours.    Plan:  Continue prescribed psychotropic medication regimen  Currently on 201 commitment status  Continue to promote patient participation in therapeutic milieu.  Continue medical management per medicine.  Continue behavioral health checks q.15 minutes.   Continue vitals per behavioral health unit protocol.  Safety: Safety and communication plan established to target dynamic risk factors discussed above.   Discharge and disposition: Due to ongoing coordination with ACT services, consider discharge to aunt's home while remaining on the ACT services wait list vs. continue to remain inpatient until ACT services are available.     SUBJECTIVE     Patient evaluated this a.m. for continuity of care. Nursing notes no acute behaviors. Today, Alberto feels \"depressed.\" He states an increase of " "sadness lately but cannot associate an event/reason for these feelings. When asked about negative thoughts he does not voice any in particular. He noted to nursing that his auditory hallucinations have been \"worse\" than normal. When asked, he states that voices have \"just been more heavy\" but have not changed in nature as they still threaten to kill him/family. He notes attending groups, reading the bible, and being social with peers is helpful in distracted him from the voices. Otherwise denies visual hallucinations, self-injurious thoughts/behaviors, suicidal ideation, or homicidal ideation at this time.     Alberto does not voice any acute issues/concerns this morning. He is compliant with his psychiatric medications but refused his miralax. States he has been having \"whole body spasms\" when asked about medication side effects that have occurred since starting haldol. Did not require any PRN medications in the last 24 hours. He is sleeping well with report of increased fatigue in the morning. Eating 2/3 meals and keeps up with hygiene. Denies difficulty with bowel movements. States he will continue to use coping skills to help combat his auditory hallucinations.    PSYCHIATRIC REVIEW OF SYSTEMS     Behavior over the last 24 hours: unchanged  Sleep: normal  Appetite: normal  Medication side effects: Yes, body spasms and muscle twitching    Progress Toward Goals: Limited progress, as evidenced by their participation (or lack thereof) in individual, social and therapeutic milieu in addition to adherence to their medication regimen.  Patient Acceptance: fair acceptance   Patient Understanding: Fair  Patient Involvement in Reintegration Process: Remains in contact with sister and aunt daily.  Patient's Therapeutic Relationship with Psychiatrist: Trusting  Patient's Optimism Regarding Recovery: More depressed than baseline and continues to struggle with auditory hallucinations. Due to ongoing coordination with ACT " services, consider discharge to aunt's home while remaining on the ACT services wait list vs. continue to remain inpatient until ACT services are available.  Group Attendance: 9/11    REVIEW OF SYSTEMS   Review of systems: no complaints    OBJECTIVE     Vital Signs in Past 24 Hours:  Temp:  [97.5 °F (36.4 °C)-97.8 °F (36.6 °C)] 97.8 °F (36.6 °C)  HR:  [] 84  BP: (119-127)/(70-73) 119/73  Resp:  [18] 18  SpO2:  [97 %-100 %] 98 %  O2 Device: None (Room air)    Intake/Output in Past 24 hours:  No intake/output data recorded.  No intake/output data recorded.        Laboratory Results:  I have personally reviewed all pertinent laboratory/tests results.  Most Recent Labs:   Lab Results   Component Value Date    WBC 7.82 02/18/2025    RBC 5.80 (H) 02/18/2025    HGB 13.4 02/18/2025    HCT 45.1 02/18/2025     02/18/2025    RDW 14.0 02/18/2025    NEUTROABS 4.08 02/18/2025    SODIUM 140 10/16/2024    K 3.8 10/16/2024     10/16/2024    CO2 29 10/16/2024    BUN 9 10/16/2024    CREATININE 0.91 10/16/2024    GLUC 94 10/16/2024    GLUF 94 10/16/2024    CALCIUM 9.5 10/16/2024    AST 26 09/30/2024    ALT 42 09/30/2024    ALKPHOS 64 09/30/2024    TP 6.6 09/30/2024    ALB 3.8 09/30/2024    TBILI 0.47 09/30/2024    CHOLESTEROL 156 03/14/2024    HDL 44 03/14/2024    TRIG 133 03/14/2024    LDLCALC 85 03/14/2024    NONHDLC 112 03/14/2024    LITHIUM 0.61 01/09/2024    [id number] 1.062 11/15/2023    HGBA1C 5.0 04/01/2024    EAG 97 04/01/2024       Behavioral Health Medications: all current active meds have been reviewed.    Current Facility-Administered Medications   Medication Dose Route Frequency Provider Last Rate    acetaminophen  650 mg Oral Q4H PRN Jordan C Holter, DO      acetaminophen  650 mg Oral Q6H PRN HOLLI Lion      aluminum-magnesium hydroxide-simethicone  30 mL Oral Q4H PRN Jordan C Holter, DO      amLODIPine  5 mg Oral Daily HOLLI Lion      Artificial Tears  1 drop Both Eyes Q3H PRN Ion  C Holter, DO      haloperidol lactate  2.5 mg Intramuscular Q4H PRN Max 4/day STEVE LionNP      And    LORazepam  1 mg Intramuscular Q4H PRN Max 4/day HOLLI Lion      And    benztropine  0.5 mg Intramuscular Q4H PRN Max 4/day STEVE LinoNP      haloperidol lactate  5 mg Intramuscular Q4H PRN Max 4/day HOLLI Lion      And    LORazepam  2 mg Intramuscular Q4H PRN Max 4/day HOLLI Lion      And    benztropine  1 mg Intramuscular Q4H PRN Max 4/day HOLLI Lion      bisacodyl  10 mg Rectal Daily PRN HOLLI Lion      calcium carbonate  500 mg Oral BID PRN HOLLI Monge      cloZAPine  250 mg Oral HS Bora Rosario MD      hydrOXYzine HCL  50 mg Oral Q6H PRN Max 4/day Jordan C Holter, DO      Or    diphenhydrAMINE  50 mg Intramuscular Q6H PRN Jordan C Holter,       hydrOXYzine HCL  50 mg Oral Q6H PRN Max 4/day HOLLI Lion      Or    diphenhydrAMINE  50 mg Intramuscular Q6H PRN HOLLI Lion      diphenhydrAMINE-zinc acetate   Topical BID PRN HOLLI Lion      docusate sodium  100 mg Oral BID Jourdan Dickens, DO      escitalopram  20 mg Oral Daily HOLLI Lion      famotidine  20 mg Oral BID PRN HOLLI Monge      fluticasone  1 spray Each Nare Daily PRN HOLLI Monge      haloperidol  1 mg Oral Q6H PRN HOLLI Lion      haloperidol  2.5 mg Oral Q4H PRN Max 4/day HOLLI Lion      haloperidol  5 mg Oral Q4H PRN Max 4/day HOLLI Lion      haloperidol  7.5 mg Oral BID Jordan C Holter, DO      hydroCHLOROthiazide  12.5 mg Oral Daily Pia Mantilla, HOLLI      hydrocortisone   Topical 4x Daily PRN HOLLI Lion      hydrOXYzine HCL  100 mg Oral Q6H PRN Max 4/day Jordan C Holter,       Or    LORazepam  2 mg Intramuscular Q6H PRN Jordan C Holter, DO      hydrOXYzine HCL  100 mg Oral Q6H PRN Max 4/day HOLLI Lion      Or    LORazepam  2 mg Intramuscular Q6H PRN Eveline Hunt,  STEVENP      hydrOXYzine HCL  25 mg Oral Q6H PRN Max 4/day [facility] Holter, DO      ibuprofen  600 mg Oral Q8H PRN HOLLI Lion      influenza vaccine  0.5 mL Intramuscular Once Bora Rosario MD      loratadine  10 mg Oral Daily Brina Guillen MD      magnesium hydroxide  30 mL Oral Once Jourdan Dickens, DO      melatonin  3 mg Oral HS PRN Bora Rosario MD      melatonin  9 mg Oral HS Bora Rosario MD      methocarbamol  500 mg Oral Q6H PRN HOLLI Lion      multivitamin-minerals  1 tablet Oral Daily Eileen Gonzalezmiryamjaylen, HOLLI      nicotine polacrilex  2 mg Oral Q4H PRN Bora Rosario MD      OLANZapine  5 mg Oral Q4H PRN Max 3/day [facility] Holter, DO      Or    OLANZapine  2.5 mg Intramuscular Q4H PRN Max 3/day [facility] Holter, DO      OLANZapine  5 mg Oral Q3H PRN Max 3/day [facility] Holter, DO      Or    OLANZapine  5 mg Intramuscular Q3H PRN Max 3/day [facility] Holter, DO      OLANZapine  2.5 mg Oral Q4H PRN Max 6/day [facility] Holter, DO      ondansetron  4 mg Oral Q6H PRN HOLLI Lion      pantoprazole  20 [facility]      polyethylene glycol  17 g Oral Daily PRN Sofi Yoo, HOLLI      polyethylene glycol  17 g Oral Daily Jourdan Dickens, DO      propranolol  10 mg Oral Q12H KAYLIE HOLLI Lion      saliva substitute  5 spray Mouth/Throat 4x Daily PRN Mary Jo Reich PA-C      senna-docusate sodium  1 tablet Oral Daily PRN [facility] Holter, DO      senna-docusate sodium  2 tablet Oral HS Jourdan Dickens, DO      traZODone  50 mg Oral HS PRN HOLLI Lion      white petrolatum-mineral oil   Topical TID PRN HOLLI Lion         Risks, benefits and possible side effects of Medications:   Risks, benefits, and possible side effects of medications explained to patient and patient verbalizes understanding and agreement for treatment.    Dual Antipsychotic Rationale: Patient requires dual antipsychotic therapy due to multiple failed medication trials and inability to  "control symptoms on a single agent.     Mental Status Evaluation:  Appearance:  Alert,age appropriate, casually dressed, adequate grooming, in no acute distress   Behavior:  pleasant, cooperative, calm, limited eye contact   Speech:  normal rate and volume, clear, coherent, slow, scant   Mood:  \"Depressed\"   Affect:  blunted   Thought Process:  organized, linear, normal rate of thoughts   Associations: concrete associations   Thought Content:  mild paranoia   Perceptual Disturbances: Ongoing auditory hallucinations of voices stating they will kill him and family,no visual hallucinations, does not appear responding to internal stimuli   Risk Potential: Suicidal ideation - Denies  Homicidal ideation - Denies  Potential for aggression - Not at present    Sensorium:  oriented to person, place, and time/date   Memory:  recent and remote memory grossly intact   Consciousness:  alert and awake   Attention/Concentration: attention span and concentration appear shorter than expected for age   Insight:  Fair   Judgment: fair   Gait/Station: in bed   Motor Activity: no abnormal movements     Recommended Treatment:   See above for assessment and plan.    Counseling/Coordination of Care    Total unit time spent today was greater than 15 minutes. Greater than 50% of total time was spent with the patient and/or patient's relatives and/or coordination of patient's care.     Patient's rights, confidentiality, exceptions to confidentiality, use of electronic medical record including appropriate staff access to medical record regarding behavioral health services and consent to treatment were reviewed.    Note Share:     This note was not shared with the patient due to reasonable likelihood of causing patient harm     Sharon Lilly, PA-S  "

## 2025-02-19 NOTE — NURSING NOTE
"Pt is accepting of medications without incidence, but held his AM Amlodipine for parameters. Meal compliant consuming 100% of lunch, but refused breakfast. Social with select peers and attends some groups. Pt reports anxiety and depression with symptoms worse when he is \" laying in bed\". Pt is otherwise polite and pleasant and brightens on approach with conversation. AH are \"the same\". Denies all other s/s. No concerns at this time.   "

## 2025-02-19 NOTE — SOCIAL WORK
This writer spoke with patient's Aunt regarding a possible discharge to home without ACT in place before his discharge. Aunt discussed her concerns with patient being discharged without ACT services. She discussed her concern of the lack of family support and acceptance of his mental health issues, his mother's current hospitalization and his multiple readmissions when he does not have ACT services. She discussed concerns patient's finances are not being managed well by his sister and suggests patient consider an organization assigned as his Rep Payee. Aunt requested this writer have this discussion with patient. This writer provided information for [facility] family support group.   This writer met with patient and discussed Rep Payee options. Patient's requested to be his own Rep Payee. This writer and patient will call [facility] and begin the process.

## 2025-02-19 NOTE — NURSING NOTE
Alberto is compliant with meds and meals. Social with select peers. Napping in room earlier this shift. Cooperative and pleasant Nicorette gum at 1724 Continues to hear voices.  Given Nicorette gum at 2125

## 2025-02-19 NOTE — ASSESSMENT & PLAN NOTE
Reviewed 2/19/2025  On nicotine gum as needed and encouraged smoking cessation  Continue to encourage cessation upon discharge  No associated orders from this encounter found during lookback period of 72 hours.

## 2025-02-19 NOTE — ASSESSMENT & PLAN NOTE
Review 2/19/2025  Follows with medical  Continue famotidine 20 mg tablet BID per medical team   Continue pantoprazole EC 20 mg tablet every morning per medical team  This is a chronic condition, and is stable unless otherwise noted.  No associated orders from this encounter found during lookback period of 72 hours.

## 2025-02-20 PROCEDURE — 99232 SBSQ HOSP IP/OBS MODERATE 35: CPT | Performed by: PSYCHIATRY & NEUROLOGY

## 2025-02-20 RX ADMIN — HYDROCHLOROTHIAZIDE 12.5 MG: 12.5 TABLET ORAL at 09:11

## 2025-02-20 RX ADMIN — MULTIPLE VITAMINS W/ MINERALS TAB 1 TABLET: TAB ORAL at 09:11

## 2025-02-20 RX ADMIN — NICOTINE POLACRILEX 2 MG: 2 GUM, CHEWING ORAL at 17:13

## 2025-02-20 RX ADMIN — LORATADINE 10 MG: 10 TABLET ORAL at 09:11

## 2025-02-20 RX ADMIN — AMLODIPINE BESYLATE 5 MG: 5 TABLET ORAL at 09:11

## 2025-02-20 RX ADMIN — MELATONIN TAB 3 MG 9 MG: 3 TAB at 21:10

## 2025-02-20 RX ADMIN — NICOTINE POLACRILEX 2 MG: 2 GUM, CHEWING ORAL at 12:28

## 2025-02-20 RX ADMIN — DOCUSATE SODIUM 100 MG: 100 CAPSULE, LIQUID FILLED ORAL at 17:14

## 2025-02-20 RX ADMIN — ESCITALOPRAM OXALATE 20 MG: 10 TABLET, FILM COATED ORAL at 09:11

## 2025-02-20 RX ADMIN — HALOPERIDOL 7.5 MG: 5 TABLET ORAL at 17:13

## 2025-02-20 RX ADMIN — PROPRANOLOL HYDROCHLORIDE 10 MG: 10 TABLET ORAL at 09:11

## 2025-02-20 RX ADMIN — SENNOSIDES AND DOCUSATE SODIUM 2 TABLET: 50; 8.6 TABLET ORAL at 21:11

## 2025-02-20 RX ADMIN — PROPRANOLOL HYDROCHLORIDE 10 MG: 10 TABLET ORAL at 21:10

## 2025-02-20 RX ADMIN — BENZTROPINE MESYLATE 0.5 MG: 0.5 TABLET ORAL at 09:11

## 2025-02-20 RX ADMIN — PANTOPRAZOLE SODIUM 20 MG: 20 TABLET, DELAYED RELEASE ORAL at 09:11

## 2025-02-20 RX ADMIN — CLOZAPINE 250 MG: 25 TABLET ORAL at 21:11

## 2025-02-20 RX ADMIN — NICOTINE POLACRILEX 2 MG: 2 GUM, CHEWING ORAL at 21:10

## 2025-02-20 RX ADMIN — HALOPERIDOL 7.5 MG: 5 TABLET ORAL at 09:11

## 2025-02-20 RX ADMIN — DOCUSATE SODIUM 100 MG: 100 CAPSULE, LIQUID FILLED ORAL at 09:11

## 2025-02-20 RX ADMIN — BENZTROPINE MESYLATE 0.5 MG: 0.5 TABLET ORAL at 17:14

## 2025-02-20 NOTE — ASSESSMENT & PLAN NOTE
Reviewed 2/20/2025  Follows with medical team on hydrochlorothiazide and Norvasc  Blood pressure remains stable at this time.  No associated orders from this encounter found during lookback period of 72 hours.

## 2025-02-20 NOTE — PROGRESS NOTES
02/20/25 0752   Team Meeting   Meeting Type Daily Rounds   Team Members Present   Team Members Present Physician;Nurse;;Other (Discipline and Name)   Physician Team Member Holter   Nursing Team Member Claudia   Social Work Team Member Jose J ORTEGA   Other (Discipline and Name) Wang PCM   Patient/Family Present   Patient Present No   Patient's Family Present No     Groups Participation  8/10.   Patient's compliant with medications. He's engaged in his treatment. He's appropriate. Waiting for [facility] ACT services. Reports some anxiety and depression. Social with select peers.

## 2025-02-20 NOTE — ASSESSMENT & PLAN NOTE
Reviewed 2/20/2025  Endorses increased depression and intermittent passive suicidal ideation, denies intent/plan. Increased stress surrounding discharge planning. Continues to report auditory hallucinations threatening to kill him/family but can recognize these hallucinations are not based in reality. Compliant with psychiatric medications but has been refusing his miralax. Reports medication side effects such as whole body spasms since starting haldol, added cogentin to lessen side effects.     Continue medication as follows:  Continue Clozapine 250 mg QHS for psychosis (previously increased 12/11/24)  02/18: , BNP 25, CRP 10, ANC 4.08  02/04:, BNP 10, CRP 9.5,ANC 4.42  To consider further careful titration  CBC w/diff and CK every 2 weeks  Continue Cogentin 0.5 mg BID for body spasms, started on 2/19/25  Continue Haldol 7.5 mg BID for psychosis , increased on 2/13/2025  Continue Lexapro 20 mg daily for depression and anxiety  Continue Propanolol 10 mg BID for anxiety   Continue Melatonin 9 mg QHS for sleep  Continue Colace and Senna-Docusate sodium 50 mg tablet QHS for constipation  Continue Zofran 4 mg tablet Q6H PRN for nausea   Continue PRN mouth kote for xerostomia     - Pt remains on dual antipsychotic Tx due to failure on monotherapy   .  - S/p ECT treatments.  - ACT team referral was made for him living back with his aunt once MA funding comes through from the [facility] and sister will try to reapply  No associated orders from this encounter found during lookback period of 72 hours.

## 2025-02-20 NOTE — PLAN OF CARE
Problem: Alteration in Thoughts and Perception  Goal: Treatment Goal: Gain control of psychotic behaviors/thinking, reduce/eliminate presenting symptoms and demonstrate improved reality functioning upon discharge  Outcome: Progressing  Goal: Refrain from acting on delusional thinking/internal stimuli  Description: Interventions:  - Monitor patient closely, per order   - Utilize least restrictive measures   - Set reasonable limits, give positive feedback for acceptable   - Administer medications as ordered and monitor of potential side effects  Outcome: Progressing  Goal: Agree to be compliant with medication regime, as prescribed and report medication side effects  Description: Interventions:  - Offer appropriate PRN medication and supervise ingestion; conduct AIMS, as needed   Outcome: Progressing  Goal: Recognize dysfunctional thoughts, communicate reality-based thoughts at the time of discharge  Description: Interventions:  - Provide medication and psycho-education to assist patient in compliance and developing insight into his/her illness   Outcome: Progressing     Problem: Depression  Goal: Treatment Goal: Demonstrate behavioral control of depressive symptoms, verbalize feelings of improved mood/affect, and adopt new coping skills prior to discharge  Outcome: Progressing  Goal: Verbalize thoughts and feelings  Description: Interventions:  - Assess and re-assess patient's level of risk   - Engage patient in 1:1 interactions, daily, for a minimum of 15 minutes   - Encourage patient to express feelings, fears, frustrations, hopes   Outcome: Progressing  Goal: Refrain from harming self  Description: Interventions:  - Monitor patient closely, per order   - Supervise medication ingestion, monitor effects and side effects   Outcome: Progressing  Goal: Refrain from isolation  Description: Interventions:  - Develop a trusting relationship   - Encourage socialization   Outcome: Progressing  Goal: Refrain from  self-neglect  Outcome: Progressing     Problem: Anxiety  Goal: Anxiety is at manageable level  Description: Interventions:  - Assess and monitor patient's anxiety level.   - Monitor for signs and symptoms (heart palpitations, chest pain, shortness of breath, headaches, nausea, feeling jumpy, restlessness, irritable, apprehensive).   - Collaborate with interdisciplinary team and initiate plan and interventions as ordered.  - [place] patient to unit/surroundings  - Explain treatment plan  - Encourage participation in care  - Encourage verbalization of concerns/fears  - Identify coping mechanisms  - Assist in developing anxiety-reducing skills  - Administer/offer alternative therapies  - Limit or eliminate stimulants  Outcome: Progressing     Problem: Alteration in Orientation  Goal: Treatment Goal: Demonstrate a reduction of confusion and improved orientation to person, place, time and/or situation upon discharge, according to optimum baseline/ability  Outcome: Progressing  Goal: Interact with staff daily  Description: Interventions:  - Assess and re-assess patient's level of orientation  - Engage patient in 1 on 1 interactions, daily, for a minimum of 15 minutes   - Establish rapport/trust with patient   Outcome: Progressing  Goal: Allow medical examinations, as recommended  Description: Interventions:  - Provide physical/neurological exams and/or referrals, per provider   Outcome: Progressing  Goal: Cooperate with recommended testing/procedures  Description: Interventions:  - Determine need for ancillary testing  - Observe for mental status changes  - Implement falls/precaution protocol   Outcome: Progressing  Goal: Complete daily ADLs, including personal hygiene independently, as able  Description: Interventions:  - Observe, teach, and assist patient with ADLS  Outcome: Progressing     Problem: DISCHARGE PLANNING  Goal: Discharge to home with appropriate resources  Description: INTERVENTIONS:  - Identify barriers to  discharge w/patient and caregiver  - Arrange for needed discharge resources and transportation as appropriate  - Identify discharge learning needs (meds, wound care, etc.)  - Refer to Case Management Department for coordinating discharge planning if the patient needs post-hospital services based on physician/advanced practitioner order or complex needs related to functional status, cognitive ability, or social support system  Outcome: Progressing

## 2025-02-20 NOTE — NURSING NOTE
Alberto is compliant with meds and meals. Nicorette gum at 1713 and 2110 Social with select peers.  Continues to hear voices. Dressed in shirt and leggings tonight.

## 2025-02-20 NOTE — SOCIAL WORK
This writer spoke with Mrs Hoang with [facility] ([phone]). This writer discussed patient's request to be his own Rep Payee. This writer discussed patient's current Rep Payee has stated she's forwarded his funds to his [employer] or [employer] account but the patient has not received these funds and Rep Payee states she's not sure what occurred when she transferred his funds. Mrs Hoang will suspend patient's benefits until he's discharged from the hospital. At that time he can petition to be his own Rep Payee. Mrs Hoang spoke with patient and explained to patient. She also informed patient she will contact his Rep Payee.

## 2025-02-20 NOTE — PROGRESS NOTES
Progress Note - Behavioral Health   Name: Alberto Berumen 29 y.o. male I MRN: [id number]  Unit/Bed#: [facility] 112-02 I Date of Admission: 3/29/2024   Date of Service: 2/20/2025 I Hospital Day: 328     Assessment & Plan  Schizoaffective disorder, bipolar type (HCC)  Reviewed 2/20/2025  Endorses increased depression and intermittent passive suicidal ideation, denies intent/plan. Increased stress surrounding discharge planning. Continues to report auditory hallucinations threatening to kill him/family but can recognize these hallucinations are not based in reality. Compliant with psychiatric medications but has been refusing his miralax. Reports medication side effects such as whole body spasms since starting haldol, added cogentin to lessen side effects.     Continue medication as follows:  Continue Clozapine 250 mg QHS for psychosis (previously increased 12/11/24)  02/18: , BNP 25, CRP 10, ANC 4.08  02/04:, BNP 10, CRP 9.5,ANC 4.42  To consider further careful titration  CBC w/diff and CK every 2 weeks  Continue Cogentin 0.5 mg BID for body spasms, started on 2/19/25  Continue Haldol 7.5 mg BID for psychosis , increased on 2/13/2025  Continue Lexapro 20 mg daily for depression and anxiety  Continue Propanolol 10 mg BID for anxiety   Continue Melatonin 9 mg QHS for sleep  Continue Colace and Senna-Docusate sodium 50 mg tablet QHS for constipation  Continue Zofran 4 mg tablet Q6H PRN for nausea   Continue PRN mouth kote for xerostomia     - Pt remains on dual antipsychotic Tx due to failure on monotherapy   .  - S/p ECT treatments.  - ACT team referral was made for him living back with his aunt once MA funding comes through from the [facility] and sister will try to reapply  No associated orders from this encounter found during lookback period of 72 hours.    Chronic idiopathic constipation  Reviewed 2/20/2025  Follows with medical team  Continue senna-docusate sodium dose per medical team.    Continue with regular bowel movements, refused miralax this AM.  No associated orders from this encounter found during lookback period of 72 hours.  GERD (gastroesophageal reflux disease)  Review 2/20/2025  Follows with medical  Continue famotidine 20 mg tablet BID per medical team   Continue pantoprazole EC 20 mg tablet every morning per medical team  This is a chronic condition, and is stable unless otherwise noted.  No associated orders from this encounter found during lookback period of 72 hours.  Tobacco abuse  Reviewed 2/20/2025  On nicotine gum as needed and encouraged smoking cessation  Continue to encourage cessation upon discharge  No associated orders from this encounter found during lookback period of 72 hours.  Elevated hemoglobin A1c  Reviewed to 2/20/25  Follows with medical team  No associated orders from this encounter found during lookback period of 72 hours.  Class 2 obesity in adult  Reviewed  2/20/25  Follows with medical team and given dietary counseling and need for exercise, most recent BMI 39.09 on 2/16/2025  No associated orders from this encounter found during lookback period of 72 hours.    Primary hypertension  Reviewed 2/20/2025  Follows with medical team on hydrochlorothiazide and Norvasc  Blood pressure remains stable at this time.  No associated orders from this encounter found during lookback period of 72 hours.    Plan:  Continue prescribed psychotropic medication regimen  Currently on 201 commitment status  Continue to promote patient participation in therapeutic milieu.  Continue medical management per medicine.  Continue behavioral health checks q.15 minutes.   Continue vitals per behavioral health unit protocol.  Safety: Safety and communication plan established to target dynamic risk factors discussed above.   Discharge and disposition: Pending [facility] funding for ACT services prior to discharge to aunt's home.     SUBJECTIVE     Patient evaluated this a.m. for continuity of  "care. Nursing reports no acute behaviors. Today, Alberto feels \"alright.\" He remained in bed with a blanket over his face during this interview, uncovered face when asked. Alberto reports increased stress/anxiety surrounding the discussion of discharge planning. He does not voice any particular concerns but that discharge as a whole is stressful for him to consider in the context of his ongoing auditory hallucinations. Continues to endorse hearing male/females that threaten to kill him. These are fixed hallucinations; denies command or visual hallucinations at this time.     When asked about passive thoughts of not wanting to be alive, Alberto reports he does \"think about not wanting to be here\" intermittently. States he does not feel that way this morning but that the thought has crossed his mind throughout the previous days. Denies specific thoughts of self-injurious behaviors or suicidal intent/plan at this time. Denies homicidal ideation.     Alberto voices no complaints this morning. He continues to endorse full body spasms as a medication side effect. Patient educated on the addition of Cogentin to his medications in an effort to relieve those side effects. Remains medication compliant except for refusing his miralax and has not needed any PRNs in the last 24 hours. Denies difficulty with bowel movements at this time. Eats 2/3 meals, showers regularly, and reports no issues with sleep. States he will continue to utilize coping skills and getting out of his bedroom/socialize which improves his mood and helps with his hallucinations.  PSYCHIATRIC REVIEW OF SYSTEMS     Behavior over the last 24 hours:  unchanged  Sleep: normal  Appetite: normal  Medication side effects: Yes, body spasms    Progress Toward Goals: Limited progress, as evidenced by their participation (or lack thereof) in individual, social and therapeutic milieu in addition to adherence to their medication regimen.  Patient Acceptance: Fair " acceptance  Patient Understanding: fair  Patient Involvement in Reintegration Process: Remains in contact with sister and mother outside of facility.  Patient's Therapeutic Relationship with Psychiatrist: trusting  Patient's Optimism Regarding Recovery: Remains engaged in his treatment and social with select peers. Ongoing discharge discussions with aunt, likely continue to wait until coordination with ACT services prior to discharge. Pending SSA request.  Group Attendance: 8/10    REVIEW OF SYSTEMS   Review of systems: no complaints    OBJECTIVE     Vital Signs in Past 24 Hours:  Temp:  [96.5 °F (35.8 °C)-97.5 °F (36.4 °C)] 96.5 °F (35.8 °C)  HR:  [] 77  BP: (122-129)/(73-76) 128/76  Resp:  [18] 18  SpO2:  [97 %] 97 %  O2 Device: None (Room air)    Intake/Output in Past 24 hours:  No intake/output data recorded.  No intake/output data recorded.        Laboratory Results:  I have personally reviewed all pertinent laboratory/tests results.  Most Recent Labs:   Lab Results   Component Value Date    WBC 7.82 02/18/2025    RBC 5.80 (H) 02/18/2025    HGB 13.4 02/18/2025    HCT 45.1 02/18/2025     02/18/2025    RDW 14.0 02/18/2025    NEUTROABS 4.08 02/18/2025    SODIUM 140 10/16/2024    K 3.8 10/16/2024     10/16/2024    CO2 29 10/16/2024    BUN 9 10/16/2024    CREATININE 0.91 10/16/2024    GLUC 94 10/16/2024    GLUF 94 10/16/2024    CALCIUM 9.5 10/16/2024    AST 26 09/30/2024    ALT 42 09/30/2024    ALKPHOS 64 09/30/2024    TP 6.6 09/30/2024    ALB 3.8 09/30/2024    TBILI 0.47 09/30/2024    CHOLESTEROL 156 03/14/2024    HDL 44 03/14/2024    TRIG 133 03/14/2024    LDLCALC 85 03/14/2024    NONHDLC 112 03/14/2024    LITHIUM 0.61 01/09/2024    [id number] 1.062 11/15/2023    HGBA1C 5.0 04/01/2024    EAG 97 04/01/2024       Behavioral Health Medications: all current active meds have been reviewed.    Current Facility-Administered Medications   Medication Dose Route Frequency Provider Last Rate     acetaminophen  650 mg Oral Q4H PRN Jordan C Holter, DO      acetaminophen  650 mg Oral Q6H PRN HOLLI Lion      aluminum-magnesium hydroxide-simethicone  30 mL Oral Q4H PRN Jordan C Holter,       amLODIPine  5 mg Oral Daily HOLLI Lion      Artificial Tears  1 drop Both Eyes Q3H PRN Jordan C Holter,       haloperidol lactate  2.5 mg Intramuscular Q4H PRN Max 4/day HOLLI Lion      And    LORazepam  1 mg Intramuscular Q4H PRN Max 4/day HOLLI Lion      And    benztropine  0.5 mg Intramuscular Q4H PRN Max 4/day HOLLI Lion      haloperidol lactate  5 mg Intramuscular Q4H PRN Max 4/day HOLLI Lion      And    LORazepam  2 mg Intramuscular Q4H PRN Max 4/day HOLLI Lion      And    benztropine  1 mg Intramuscular Q4H PRN Max 4/day HOLLI Lion      benztropine  0.5 mg Oral BID Jordan C Holter, DO      bisacodyl  10 mg Rectal Daily PRN HOLLI Lion      calcium carbonate  500 mg Oral BID PRN HOLLI Monge      cloZAPine  250 mg Oral HS Bora Rosario MD      hydrOXYzine HCL  50 mg Oral Q6H PRN Max 4/day Jordan C Holter, DO      Or    diphenhydrAMINE  50 mg Intramuscular Q6H PRN Jordan C Holter,       hydrOXYzine HCL  50 mg Oral Q6H PRN Max 4/day HOLLI Lion      Or    diphenhydrAMINE  50 mg Intramuscular Q6H PRN HOLLI Lion      diphenhydrAMINE-zinc acetate   Topical BID PRN HOLLI Lion      docusate sodium  100 mg Oral BID Jourdan Dickens,       escitalopram  20 mg Oral Daily HOLLI Lion      famotidine  20 mg Oral BID PRN HOLLI Monge      fluticasone  1 spray Each Nare Daily PRN HOLLI Monge      haloperidol  1 mg Oral Q6H PRN HOLLI Lion      haloperidol  2.5 mg Oral Q4H PRN Max 4/day HOLLI Lion      haloperidol  5 mg Oral Q4H PRN Max 4/day HOLLI Lion      haloperidol  7.5 mg Oral BID Jordan C Holter, DO      hydroCHLOROthiazide  12.5 mg Oral Daily  Pia Mantilla, HOLLI      hydrocortisone   Topical 4x Daily PRN HOLLI Lion      hydrOXYzine HCL  100 mg Oral Q6H PRN Max 4/day [facility] Holter, DO      Or    LORazepam  2 mg Intramuscular Q6H PRN [facility] Holter, DO      hydrOXYzine HCL  100 mg Oral Q6H PRN Max 4/day HOLLI Lion      Or    LORazepam  2 mg Intramuscular Q6H PRN HOLLI Lion      hydrOXYzine HCL  25 mg Oral Q6H PRN Max 4/day [facility] Holter, DO      ibuprofen  600 mg Oral Q8H PRN HOLLI Lion      influenza vaccine  0.5 mL Intramuscular Once Bora Rosario MD      loratadine  10 mg Oral Daily Brina Guillen MD      magnesium hydroxide  30 mL Oral Once Jourdan Dickens, DO      melatonin  3 mg Oral HS PRN Bora Rosario MD      melatonin  9 mg Oral HS Bora Rosario MD      methocarbamol  500 mg Oral Q6H PRN HOLLI Lion      multivitamin-minerals  1 tablet Oral Daily HOLLI Monge      nicotine polacrilex  2 mg Oral Q4H PRN Bora Rosario MD      OLANZapine  5 mg Oral Q4H PRN Max 3/day [facility] Holter, DO      Or    OLANZapine  2.5 mg Intramuscular Q4H PRN Max 3/day [facility] Holter, DO      OLANZapine  5 mg Oral Q3H PRN Max 3/day [facility] Holter, DO      Or    OLANZapine  5 mg Intramuscular Q3H PRN Max 3/day [facility] Holter, DO      OLANZapine  2.5 mg Oral Q4H PRN Max 6/day [facility] Holter, DO      ondansetron  4 mg Oral Q6H PRN HOLLI Lion      pantoprazole  20 mg Oral [facility] Holter, DO      polyethylene glycol  17 g Oral Daily PRN HOLLI Ghotra      polyethylene glycol  17 g Oral Daily Jourdan Dickens, DO      propranolol  10 mg Oral Q12H KAYLIE HOLLI Lion      saliva substitute  5 spray Mouth/Throat 4x Daily PRN Mary Jo Reich PA-C      senna-docusate sodium  1 tablet Oral Daily PRN [facility] Holter, DO      senna-docusate sodium  2 tablet Oral HS Jourdan Dickens, DO      traZODone  50 mg Oral HS PRN Eveline Hangey, CRNP      white petrolatum-mineral oil   Topical TID PRN  "HOLLI Lion         Risks, benefits and possible side effects of Medications:   Risks, benefits, and possible side effects of medications explained to patient and patient verbalizes understanding and agreement for treatment.    Dual Antipsychotic Rationale: Patient requires dual antipsychotic therapy due to multiple failed medication trials and inability to control symptoms on a single agent.     Mental Status Evaluation:  Appearance:  Alert, age appropriate, casually dressed, adequate grooming, in no acute distress   Behavior:  pleasant, cooperative, calm, minimal eye contact   Speech:  normal rate and volume, clear, slow, scant   Mood:  \"Alright\"   Affect:  blunted, depressed   Thought Process:  Organized, linear, normal rate and flow   Associations: concrete associations   Thought Content:  mild paranoia   Perceptual Disturbances: Ongoing auditory hallucinations of male/female voices threatening to kill him/family. no visual hallucinations, does not appear responding to internal stimuli   Risk Potential: Suicidal ideation - Yes, without plan, intermittent passive thoughts   Homicidal ideation - denies  Potential for aggression - not at present   Sensorium:  oriented to person, place, and time/date   Memory:  recent and remote memory grossly intact   Consciousness:  alert and awake   Attention/Concentration: attention span and concentration appear shorter than expected for age   Insight:  fair   Judgment: limited   Gait/Station: in bed   Motor Activity: no abnormal movements     Recommended Treatment:   See above for assessment and plan.    Counseling/Coordination of Care    Total unit time spent today was greater than 15 minutes. Greater than 50% of total time was spent with the patient and/or patient's relatives and/or coordination of patient's care.     Patient's rights, confidentiality, exceptions to confidentiality, use of electronic medical record including appropriate staff access to medical record " regarding behavioral health services and consent to treatment were reviewed.    Note Share:     This note was not shared with the patient due to reasonable likelihood of causing patient harm     Sharon Pierre   Psychiatry, PA-S

## 2025-02-20 NOTE — ASSESSMENT & PLAN NOTE
Reviewed 2/20/2025  On nicotine gum as needed and encouraged smoking cessation  Continue to encourage cessation upon discharge  No associated orders from this encounter found during lookback period of 72 hours.

## 2025-02-20 NOTE — ASSESSMENT & PLAN NOTE
Reviewed to 2/20/25  Follows with medical team  No associated orders from this encounter found during lookback period of 72 hours.

## 2025-02-20 NOTE — ASSESSMENT & PLAN NOTE
Reviewed  2/20/25  Follows with medical team and given dietary counseling and need for exercise, most recent BMI 39.09 on 2/16/2025  No associated orders from this encounter found during lookback period of 72 hours.

## 2025-02-20 NOTE — ASSESSMENT & PLAN NOTE
Review 2/20/2025  Follows with medical  Continue famotidine 20 mg tablet BID per medical team   Continue pantoprazole EC 20 mg tablet every morning per medical team  This is a chronic condition, and is stable unless otherwise noted.  No associated orders from this encounter found during lookback period of 72 hours.

## 2025-02-20 NOTE — ASSESSMENT & PLAN NOTE
Reviewed 2/20/2025  Follows with medical team  Continue senna-docusate sodium dose per medical team.   Continue with regular bowel movements, refused miralax this AM.  No associated orders from this encounter found during lookback period of 72 hours.

## 2025-02-21 PROCEDURE — 99232 SBSQ HOSP IP/OBS MODERATE 35: CPT | Performed by: PSYCHIATRY & NEUROLOGY

## 2025-02-21 RX ORDER — HALOPERIDOL 5 MG/1
5 TABLET ORAL 2 TIMES DAILY
Status: DISCONTINUED | OUTPATIENT
Start: 2025-02-21 | End: 2025-03-14 | Stop reason: HOSPADM

## 2025-02-21 RX ADMIN — SENNOSIDES AND DOCUSATE SODIUM 2 TABLET: 50; 8.6 TABLET ORAL at 21:11

## 2025-02-21 RX ADMIN — LORATADINE 10 MG: 10 TABLET ORAL at 08:48

## 2025-02-21 RX ADMIN — DOCUSATE SODIUM 100 MG: 100 CAPSULE, LIQUID FILLED ORAL at 17:13

## 2025-02-21 RX ADMIN — HALOPERIDOL 5 MG: 5 TABLET ORAL at 17:14

## 2025-02-21 RX ADMIN — BENZTROPINE MESYLATE 0.5 MG: 0.5 TABLET ORAL at 17:14

## 2025-02-21 RX ADMIN — MELATONIN TAB 3 MG 9 MG: 3 TAB at 21:09

## 2025-02-21 RX ADMIN — CLOZAPINE 275 MG: 25 TABLET ORAL at 21:10

## 2025-02-21 RX ADMIN — BENZTROPINE MESYLATE 0.5 MG: 0.5 TABLET ORAL at 08:47

## 2025-02-21 RX ADMIN — HYDROCHLOROTHIAZIDE 12.5 MG: 12.5 TABLET ORAL at 08:47

## 2025-02-21 RX ADMIN — PANTOPRAZOLE SODIUM 20 MG: 20 TABLET, DELAYED RELEASE ORAL at 08:48

## 2025-02-21 RX ADMIN — MULTIPLE VITAMINS W/ MINERALS TAB 1 TABLET: TAB ORAL at 08:47

## 2025-02-21 RX ADMIN — AMLODIPINE BESYLATE 5 MG: 5 TABLET ORAL at 08:47

## 2025-02-21 RX ADMIN — NICOTINE POLACRILEX 2 MG: 2 GUM, CHEWING ORAL at 12:30

## 2025-02-21 RX ADMIN — DOCUSATE SODIUM 100 MG: 100 CAPSULE, LIQUID FILLED ORAL at 08:47

## 2025-02-21 RX ADMIN — ESCITALOPRAM OXALATE 20 MG: 10 TABLET, FILM COATED ORAL at 08:47

## 2025-02-21 RX ADMIN — PROPRANOLOL HYDROCHLORIDE 10 MG: 10 TABLET ORAL at 08:47

## 2025-02-21 RX ADMIN — NICOTINE POLACRILEX 2 MG: 2 GUM, CHEWING ORAL at 17:21

## 2025-02-21 RX ADMIN — NICOTINE POLACRILEX 2 MG: 2 GUM, CHEWING ORAL at 21:11

## 2025-02-21 RX ADMIN — PROPRANOLOL HYDROCHLORIDE 10 MG: 10 TABLET ORAL at 21:09

## 2025-02-21 RX ADMIN — HALOPERIDOL 7.5 MG: 5 TABLET ORAL at 08:47

## 2025-02-21 NOTE — ASSESSMENT & PLAN NOTE
Continue supportive therapy including promotion of smoking cessation    No associated orders from this encounter found during lookback period of 72 hours.

## 2025-02-21 NOTE — NURSING NOTE
Patient remained in bed in the morning. He is compliant with medications except for miralax. Patient has flat effect. Patient has auditory hallucinations. He is more visible in the afternoon.

## 2025-02-21 NOTE — NURSING NOTE
"Patient is visible in the milieu. He is compliant with medications. He states \" I am taking it minute by minute.\" Patient has been using his coping skills. He was reading his bible and using the gym. He reports having decreased appetite with dinner. He ate 100 for lunch. He attended groups.   "

## 2025-02-21 NOTE — ASSESSMENT & PLAN NOTE
Continue medical management  No associated orders from this encounter found during lookback period of 72 hours.

## 2025-02-21 NOTE — ASSESSMENT & PLAN NOTE
Continue psychotropic medications including:   Continue upward titration of Clozapine 250 to 275 mg nightly to confer mood stability in addition to addressing psychosis  02/18: , BNP 25, CRP 10, ANC 4.08  02/04:, BNP 10, CRP 9.5,ANC 4.42  CBC w/diff and CK every 2 weeks  Introduce Cogentin 0.5 mg twice daily for EPS signs/symptoms. Upward titration as necessary and as tolerated.   Decrease Haldol 7.5 mg to 5 mg twice daily to address psychosis in setting of daytime somnolence  Continue Lexapro 20 mg daily to address depression and anxiety  Continue Propanolol 10 mg twice daily to address anxiety   Continue Melatonin 9 mg to address insomnia  Continue bowel regimen including Colace and Senna-Docusate sodium 50 mg tablet nightly for constipation    Patient pending ACT team linkage for discharge to his aunt's residence once MA funding comes through from the [facility].  No associated orders from this encounter found during lookback period of 72 hours.

## 2025-02-21 NOTE — PROGRESS NOTES
Progress Note - Behavioral Health   Name: Alberto Berumen 29 y.o. male I MRN: [id number]  Unit/Bed#: [facility] 112-02 I Date of Admission: 3/29/2024   Date of Service: 2/21/2025 I Hospital Day: 329     Assessment & Plan  Schizoaffective disorder, bipolar type (HCC)    Continue psychotropic medications including:   Continue upward titration of Clozapine 250 to 275 mg nightly to confer mood stability in addition to addressing psychosis  02/18: , BNP 25, CRP 10, ANC 4.08  02/04:, BNP 10, CRP 9.5,ANC 4.42  CBC w/diff and CK every 2 weeks  Introduce Cogentin 0.5 mg twice daily for EPS signs/symptoms. Upward titration as necessary and as tolerated.   Decrease Haldol 7.5 mg to 5 mg twice daily to address psychosis in setting of daytime somnolence  Continue Lexapro 20 mg daily to address depression and anxiety  Continue Propanolol 10 mg twice daily to address anxiety   Continue Melatonin 9 mg to address insomnia  Continue bowel regimen including Colace and Senna-Docusate sodium 50 mg tablet nightly for constipation    Patient pending ACT team linkage for discharge to his aunt's residence once MA funding comes through from the [facility].  No associated orders from this encounter found during lookback period of 72 hours.    Chronic idiopathic constipation  See above  No associated orders from this encounter found during lookback period of 72 hours.  GERD (gastroesophageal reflux disease)  Continue medical management  No associated orders from this encounter found during lookback period of 72 hours.  Tobacco abuse  Continue supportive therapy including promotion of smoking cessation    No associated orders from this encounter found during lookback period of 72 hours.  Elevated hemoglobin A1c  Continue medical management  No associated orders from this encounter found during lookback period of 72 hours.  Class 2 obesity in adult  Continue medical management  No associated orders from this encounter found  during lookback period of 72 hours.    Primary hypertension  Continue medical management  No associated orders from this encounter found during lookback period of 72 hours.    Treatment Recommendations/Precautions:  Continue to promote patient participation in therapeutic milieu.  Continue medical management per medicine.  Continue previously prescribed psychotropic medication regimen; see below.  Continue behavioral health checks q.15 minutes.   Continue vitals per behavioral health unit protocol.  Discharge date per primary team; 201 commitment status.    SUBJECTIVE     Patient evaluated this a.m. for continuity of care. Patient was discussed at length with nursing and treatment team. Per nursing, patient remains calm, cooperative, . No acute distress is noted throughout evaluation. Alberto Berumen denies suicidal/homicidal ideation in addition to thoughts of self-injury, receptive to crisis planning provided by this writer, contacting for safety in the inpatient setting, admitting to an ability to appropriately confide in staff including this writer.  Patient admits to slightly worse mood dysphoria including depression and anxiety accompanied by auditory hallucinations that are negative and derogatory in content, stating that this is related to worsening psychosocial stressors including his mother's ailing health, receptive to supportive therapy provided by this writer, pending ACT team linkage to assure appropriate outpatient psychiatric management    PSYCHIATRIC REVIEW OF SYSTEMS     Behavior over the last 24 hours:  unchanged  Sleep: adequate  Appetite: adequate  Medication side effects: No    REVIEW OF SYSTEMS   Review of systems: no complaints    OBJECTIVE     Vital Signs in Past 24 Hours:  Temp:  [97.8 °F (36.6 °C)] 97.8 °F (36.6 °C)  HR:  [84-91] 84  BP: (125-137)/(67-77) 125/76  Resp:  [18] 18  SpO2:  [98 %-99 %] 98 %  O2 Device: None (Room air)    Intake/Output in Past 24 hours:  No intake/output data  recorded.  No intake/output data recorded.        Laboratory Results:  I have personally reviewed all pertinent laboratory/tests results.  Most Recent Labs:   Lab Results   Component Value Date    WBC 7.82 02/18/2025    RBC 5.80 (H) 02/18/2025    HGB 13.4 02/18/2025    HCT 45.1 02/18/2025     02/18/2025    RDW 14.0 02/18/2025    NEUTROABS 4.08 02/18/2025    SODIUM 140 10/16/2024    K 3.8 10/16/2024     10/16/2024    CO2 29 10/16/2024    BUN 9 10/16/2024    CREATININE 0.91 10/16/2024    GLUC 94 10/16/2024    GLUF 94 10/16/2024    CALCIUM 9.5 10/16/2024    AST 26 09/30/2024    ALT 42 09/30/2024    ALKPHOS 64 09/30/2024    TP 6.6 09/30/2024    ALB 3.8 09/30/2024    TBILI 0.47 09/30/2024    CHOLESTEROL 156 03/14/2024    HDL 44 03/14/2024    TRIG 133 03/14/2024    LDLCALC 85 03/14/2024    NONHDLC 112 03/14/2024    LITHIUM 0.61 01/09/2024    VFB4RXBFMLZI 1.062 11/15/2023    HGBA1C 5.0 04/01/2024    EAG 97 04/01/2024       Behavioral Health Medications: all current active meds have been reviewed and current meds:   Current Facility-Administered Medications:     acetaminophen (TYLENOL) tablet 650 mg, Q4H PRN    acetaminophen (TYLENOL) tablet 650 mg, Q6H PRN    aluminum-magnesium hydroxide-simethicone (MAALOX) oral suspension 30 mL, Q4H PRN    amLODIPine (NORVASC) tablet 5 mg, Daily    Artificial Tears ophthalmic solution 1 drop, Q3H PRN    haloperidol lactate (HALDOL) injection 2.5 mg, Q4H PRN Max 4/day **AND** LORazepam (ATIVAN) injection 1 mg, Q4H PRN Max 4/day **AND** benztropine (COGENTIN) injection 0.5 mg, Q4H PRN Max 4/day    haloperidol lactate (HALDOL) injection 5 mg, Q4H PRN Max 4/day **AND** LORazepam (ATIVAN) injection 2 mg, Q4H PRN Max 4/day **AND** benztropine (COGENTIN) injection 1 mg, Q4H PRN Max 4/day    benztropine (COGENTIN) tablet 0.5 mg, BID    bisacodyl (DULCOLAX) rectal suppository 10 mg, Daily PRN    calcium carbonate (TUMS) chewable tablet 500 mg, BID PRN    cloZAPine (CLOZARIL) tablet  250 mg, HS    hydrOXYzine HCL (ATARAX) tablet 50 mg, Q6H PRN Max 4/day **OR** diphenhydrAMINE (BENADRYL) injection 50 mg, Q6H PRN    hydrOXYzine HCL (ATARAX) tablet 50 mg, Q6H PRN Max 4/day **OR** diphenhydrAMINE (BENADRYL) injection 50 mg, Q6H PRN    diphenhydrAMINE-zinc acetate (BENADRYL) 2-0.1 % cream, BID PRN    docusate sodium (COLACE) capsule 100 mg, BID    escitalopram (LEXAPRO) tablet 20 mg, Daily    famotidine (PEPCID) tablet 20 mg, BID PRN    fluticasone (FLONASE) 50 mcg/act nasal spray 1 spray, Daily PRN    haloperidol (HALDOL) tablet 1 mg, Q6H PRN    haloperidol (HALDOL) tablet 2.5 mg, Q4H PRN Max 4/day    haloperidol (HALDOL) tablet 5 mg, Q4H PRN Max 4/day    haloperidol (HALDOL) tablet 7.5 mg, BID    hydroCHLOROthiazide tablet 12.5 mg, Daily    hydrocortisone 1 % ointment, 4x Daily PRN    hydrOXYzine HCL (ATARAX) tablet 100 mg, Q6H PRN Max 4/day **OR** LORazepam (ATIVAN) injection 2 mg, Q6H PRN    hydrOXYzine HCL (ATARAX) tablet 100 mg, Q6H PRN Max 4/day **OR** LORazepam (ATIVAN) injection 2 mg, Q6H PRN    hydrOXYzine HCL (ATARAX) tablet 25 mg, Q6H PRN Max 4/day    ibuprofen (MOTRIN) tablet 600 mg, Q8H PRN    influenza vaccine (PF) (Fluarix) IM injection 0.5 mL, Once    loratadine (CLARITIN) tablet 10 mg, Daily    magnesium hydroxide (MILK OF MAGNESIA) oral suspension 30 mL, Once    melatonin tablet 3 mg, HS PRN    melatonin tablet 9 mg, HS    methocarbamol (ROBAXIN) tablet 500 mg, Q6H PRN    multivitamin-minerals (CENTRUM) tablet 1 tablet, Daily    nicotine polacrilex (NICORETTE) gum 2 mg, Q4H PRN    OLANZapine (ZyPREXA) tablet 5 mg, Q4H PRN Max 3/day **OR** OLANZapine (ZyPREXA) IM injection 2.5 mg, Q4H PRN Max 3/day    OLANZapine (ZyPREXA) tablet 5 mg, Q3H PRN Max 3/day **OR** OLANZapine (ZyPREXA) IM injection 5 mg, Q3H PRN Max 3/day    OLANZapine (ZyPREXA) tablet 2.5 mg, Q4H PRN Max 6/day    ondansetron (ZOFRAN-ODT) dispersible tablet 4 mg, Q6H PRN    pantoprazole (PROTONIX) EC tablet 20 mg, QAM     polyethylene glycol (MIRALAX) packet 17 g, Daily PRN    polyethylene glycol (MIRALAX) packet 17 g, Daily    propranolol (INDERAL) tablet 10 mg, Q12H KAYLIE    saliva substitute (MOUTH KOTE) mucosal solution 5 spray, 4x Daily PRN    senna-docusate sodium (SENOKOT S) 8.6-50 mg per tablet 1 tablet, Daily PRN    senna-docusate sodium (SENOKOT S) 8.6-50 mg per tablet 2 tablet, HS    traZODone (DESYREL) tablet 50 mg, HS PRN    white petrolatum-mineral oil (EUCERIN,HYDROCERIN) cream, TID PRN.    Risks, benefits and possible side effects of Medications:   Risks, benefits, and possible side effects of medications explained to patient and patient verbalizes understanding and agreement for treatment.    Dual Antipsychotic Rationale: lack of response to monotherapy     Mental Status Evaluation:  Appearance:  age appropriate, casually dressed, and disheveled   Behavior:  psychomotor retardation   Speech:  soft and scant, monotone   Mood:  anxious, depressed, and dysthymic   Affect:  flat and mood-congruent   Language sparse   Thought Process:  perserverative   Thought Content:  Appears paranoid   Perceptual Disturbances: Auditory hallucinations without commands appearing internally preoccupied   Risk Potential: Suicidal Ideations none, Homicidal Ideations none, and Potential for Aggression No   Sensorium:  person, place, and time/date   Cognition:  recent and remote memory grossly intact   Consciousness:  alert and awake    Attention: attention span appeared shorter than expected for age   Insight:  limited   Judgment: limited   Intellect Not assessed   Gait/Station: normal gait/station and normal balance   Motor Activity: no abnormal movements     Memory: Short and long term memory  fair     Progress Toward Goals: unchanged, as evidenced by their participation (or lack thereof) in individual, social and therapeutic milieu in addition to adherence to their medication regimen.      Recommended Treatment:   See above for assessment  and plan.    Inpatient Psychiatric Certification: Based upon physical, mental and social evaluations, I certify that inpatient psychiatric services are medically necessary for this patient for a duration of greater than 2 midnights for the treatment of Schizoaffective disorder, bipolar type (HCC) including psychotropic medication management, participation in the therapeutic milieu and referrals as indicated. Available alternative community resources do not meet the patient's mental health care needs. I further attest that an established written individualized plan of care has been implemented and is outlined in the patient's medical records.    Counseling/Coordination of Care    I have expended greater than 15 minutes in which more than 50% of this time was expended in counseling/coordination of patient care relating to diagnostic results, prognosis, potential risks and benefits of management options, instructions for appropriate management, patient and/or collateral education, importance of adherence to management and/or risk factor reductions. Patient's rights, confidentiality, exceptions to confidentiality, use of electronic medical record including appropriate staff access to medical record regarding behavioral health services and consent to treatment were reviewed.    Note Share:     This note was not shared with the patient due to reasonable likelihood of causing patient harm     This note has been constructed using a voice recognition system. There may be translation, syntax,  or grammatical errors. If you have any questions, please contact the dictating provider.    Jordan Christopher Holter, DO 02/21/25

## 2025-02-22 PROCEDURE — 99232 SBSQ HOSP IP/OBS MODERATE 35: CPT

## 2025-02-22 RX ADMIN — PROPRANOLOL HYDROCHLORIDE 10 MG: 10 TABLET ORAL at 08:40

## 2025-02-22 RX ADMIN — HYDROCHLOROTHIAZIDE 12.5 MG: 12.5 TABLET ORAL at 08:40

## 2025-02-22 RX ADMIN — PROPRANOLOL HYDROCHLORIDE 10 MG: 10 TABLET ORAL at 21:07

## 2025-02-22 RX ADMIN — HALOPERIDOL 5 MG: 5 TABLET ORAL at 17:10

## 2025-02-22 RX ADMIN — SENNOSIDES AND DOCUSATE SODIUM 2 TABLET: 50; 8.6 TABLET ORAL at 21:06

## 2025-02-22 RX ADMIN — ESCITALOPRAM OXALATE 20 MG: 10 TABLET, FILM COATED ORAL at 08:40

## 2025-02-22 RX ADMIN — CLOZAPINE 275 MG: 25 TABLET ORAL at 21:07

## 2025-02-22 RX ADMIN — DOCUSATE SODIUM 100 MG: 100 CAPSULE, LIQUID FILLED ORAL at 17:10

## 2025-02-22 RX ADMIN — BENZTROPINE MESYLATE 0.5 MG: 0.5 TABLET ORAL at 08:40

## 2025-02-22 RX ADMIN — DOCUSATE SODIUM 100 MG: 100 CAPSULE, LIQUID FILLED ORAL at 08:40

## 2025-02-22 RX ADMIN — LORATADINE 10 MG: 10 TABLET ORAL at 08:40

## 2025-02-22 RX ADMIN — BENZTROPINE MESYLATE 0.5 MG: 0.5 TABLET ORAL at 17:10

## 2025-02-22 RX ADMIN — MULTIPLE VITAMINS W/ MINERALS TAB 1 TABLET: TAB ORAL at 08:40

## 2025-02-22 RX ADMIN — HALOPERIDOL 5 MG: 5 TABLET ORAL at 08:40

## 2025-02-22 RX ADMIN — MELATONIN TAB 3 MG 9 MG: 3 TAB at 21:06

## 2025-02-22 RX ADMIN — NICOTINE POLACRILEX 2 MG: 2 GUM, CHEWING ORAL at 21:07

## 2025-02-22 RX ADMIN — AMLODIPINE BESYLATE 5 MG: 5 TABLET ORAL at 08:40

## 2025-02-22 RX ADMIN — NICOTINE POLACRILEX 2 MG: 2 GUM, CHEWING ORAL at 08:40

## 2025-02-22 RX ADMIN — PANTOPRAZOLE SODIUM 20 MG: 20 TABLET, DELAYED RELEASE ORAL at 08:40

## 2025-02-22 RX ADMIN — NICOTINE POLACRILEX 2 MG: 2 GUM, CHEWING ORAL at 17:10

## 2025-02-22 NOTE — PROGRESS NOTES
Progress Note - Behavioral Health   Name: Alberto Berumen 29 y.o. male I MRN: [id number]  Unit/Bed#: [facility] 112-02 I Date of Admission: 3/29/2024   Date of Service: 2/22/2025 I Hospital Day: 330     Assessment & Plan  Schizoaffective disorder, bipolar type (HCC)    Continue psychotropic medications including:   Continue upward titration of Clozapine 250 to 275 mg nightly to confer mood stability in addition to addressing psychosis  02/18: , BNP 25, CRP 10, ANC 4.08  02/04:, BNP 10, CRP 9.5,ANC 4.42  CBC w/diff and CK every 2 weeks  Introduce Cogentin 0.5 mg twice daily for EPS signs/symptoms. Upward titration as necessary and as tolerated.   Decrease Haldol 7.5 mg to 5 mg twice daily to address psychosis in setting of daytime somnolence  Continue Lexapro 20 mg daily to address depression and anxiety  Continue Propanolol 10 mg twice daily to address anxiety   Continue Melatonin 9 mg to address insomnia  Continue bowel regimen including Colace and Senna-Docusate sodium 50 mg tablet nightly for constipation    Patient pending ACT team linkage for discharge to his aunt's residence once MA funding comes through from the [facility].  No associated orders from this encounter found during lookback period of 72 hours.    Chronic idiopathic constipation  See above  No associated orders from this encounter found during lookback period of 72 hours.  GERD (gastroesophageal reflux disease)  Continue medical management  No associated orders from this encounter found during lookback period of 72 hours.  Tobacco abuse  Continue supportive therapy including promotion of smoking cessation    No associated orders from this encounter found during lookback period of 72 hours.  Elevated hemoglobin A1c  Continue medical management  No associated orders from this encounter found during lookback period of 72 hours.  Class 2 obesity in adult  Continue medical management  No associated orders from this encounter found  during lookback period of 72 hours.    Primary hypertension  Continue medical management  No associated orders from this encounter found during lookback period of 72 hours.    Current medications:  Current Facility-Administered Medications   Medication Dose Route Frequency Provider Last Rate    acetaminophen  650 mg Oral Q4H PRN Jordan C Holter, DO      acetaminophen  650 mg Oral Q6H PRN HOLLI Lion      aluminum-magnesium hydroxide-simethicone  30 mL Oral Q4H PRN Jordan C Holter, DO      amLODIPine  5 mg Oral Daily HOLLI Loin      Artificial Tears  1 drop Both Eyes Q3H PRN Jordan C Holter, DO      haloperidol lactate  2.5 mg Intramuscular Q4H PRN Max 4/day HOLLI Lion      And    LORazepam  1 mg Intramuscular Q4H PRN Max 4/day HOLLI Lion      And    benztropine  0.5 mg Intramuscular Q4H PRN Max 4/day HOLLI Lion      haloperidol lactate  5 mg Intramuscular Q4H PRN Max 4/day HOLLI Lion      And    LORazepam  2 mg Intramuscular Q4H PRN Max 4/day HOLLI Lion      And    benztropine  1 mg Intramuscular Q4H PRN Max 4/day HOLLI Lion      benztropine  0.5 mg Oral BID Jordan C Holter, DO      bisacodyl  10 mg Rectal Daily PRN HOLLI Lion      calcium carbonate  500 mg Oral BID PRN HOLLI Monge      cloZAPine  275 mg Oral HS Jordan C Holter, DO      hydrOXYzine HCL  50 mg Oral Q6H PRN Max 4/day Jordan C Holter, DO      Or    diphenhydrAMINE  50 mg Intramuscular Q6H PRN Jordan C Holter, DO      hydrOXYzine HCL  50 mg Oral Q6H PRN Max 4/day HOLLI Lion      Or    diphenhydrAMINE  50 mg Intramuscular Q6H PRN HOLLI Lion      diphenhydrAMINE-zinc acetate   Topical BID PRN HOLLI Lion      docusate sodium  100 mg Oral BID Jourdan Dickens, DO      escitalopram  20 mg Oral Daily HOLLI Lion      famotidine  20 mg Oral BID PRN HOLLI Monge      fluticasone  1 spray Each Nare Daily PRN Eileen Holliday  HOLLI Jensen      haloperidol  1 mg Oral Q6H PRN HOLLI Lion      haloperidol  2.5 mg Oral Q4H PRN Max 4/day HOLLI Lion      haloperidol  5 mg Oral Q4H PRN Max 4/day HOLLI Lion      haloperidol  5 mg Oral BID [facility] Cresencioter, DO      hydroCHLOROthiazide  12.5 mg Oral Daily Pia Mantilla, HOLLI      hydrocortisone   Topical 4x Daily PRN HOLLI Lion      hydrOXYzine HCL  100 mg Oral Q6H PRN Max 4/day [facility] Holter, DO      Or    LORazepam  2 mg Intramuscular Q6H PRN [facility] Cresencioter, DO      hydrOXYzine HCL  100 mg Oral Q6H PRN Max 4/day HOLLI Lion      Or    LORazepam  2 mg Intramuscular Q6H PRN HOLLI Lion      hydrOXYzine HCL  25 mg Oral Q6H PRN Max 4/day [facility] Holter, DO      ibuprofen  600 mg Oral Q8H PRN HOLLI Lion      influenza vaccine  0.5 mL Intramuscular Once Bora Rosario MD      loratadine  10 mg Oral Daily Brina Guillen MD      magnesium hydroxide  30 mL Oral Once Jourdan Dickens, DO      melatonin  3 mg Oral HS PRN Bora Rosario MD      melatonin  9 mg Oral HS Bora Rosario MD      methocarbamol  500 mg Oral Q6H PRN HOLLI Lion      multivitamin-minerals  1 tablet Oral Daily Eileen CherryHOLLI Jimenez      nicotine polacrilex  2 mg Oral Q4H PRN Bora Rosario MD      OLANZapine  5 mg Oral Q4H PRN Max 3/day [facility] Holter, DO      Or    OLANZapine  2.5 mg Intramuscular Q4H PRN Max 3/day [facility] Holter, DO      OLANZapine  5 mg Oral Q3H PRN Max 3/day [facility] Holter, DO      Or    OLANZapine  5 mg Intramuscular Q3H PRN Max 3/day [facility] Holter, DO      OLANZapine  2.5 mg Oral Q4H PRN Max 6/day [facility] Holter, DO      ondansetron  4 mg Oral Q6H PRN HOLLI Lion      pantoprazole  [facility] Hol[facility]      polyethylene glycol  17 g Oral Daily PRN HOLLI Ghotra      polyethylene glycol  17 g Oral Daily Jourdan Dickens, DO      propranolol  10 mg Oral Q12H KAYLIE HOLLI Lion      saliva substitute  5  "spray Mouth/Throat 4x Daily PRN Mary Jo Reich PA-C      senna-docusate sodium  1 tablet Oral Daily PRN Jordan C Holter, DO      senna-docusate sodium  2 tablet Oral HS Jourdan Dickens,       traZODone  50 mg Oral HS PRN HOLLI Lion      white petrolatum-mineral oil   Topical TID PRN HOLLI Lion          Risks/Benefits of Treatment:     Risks, benefits, and possible side effects of medications explained to patient and patient verbalizes understanding and agreement for treatment.    Progress Toward Goals: no significant Improvement today    Treatment Planning:     All current active medications have been reviewed.  Continue to monitor response to treatment and assess for potential side effects of medications.  Encourage group therapy, milieu therapy and occupational therapy  Collaboration with medical service for medical comorbidities as indicated.  Behavioral Health checks for safety monitoring.  Estimated Discharge Day: 3/31/2025     Subjective     Behavior over the last 24 hours: unchanged.    Per nursing, Alberto has been recently preoccupied with discovery that sister allegedly removed money out of his account and he has been with difficulty contacting her to further explore reason for missing funds.     He is laying in bed asleep, and wakes up to complete assessment. He is pleasant on approach and reports mood has been generally stable.  He states mood as \"fine, at the moment\".  He denies suicidal or homicidal ideation.  He denies any visual and auditory hallucinations.  He does not appear to be responding to IS.  He denies any side effects to current psychotropic medication.  He reports he is eating and sleeping well.  He reports good appetite.      Sleep: normal  Appetite: fair  Medication side effects: No  ROS: review of systems as noted above in HPI/Subjective report, all other systems are negative    Objective :  Temp:  [97.6 °F (36.4 °C)-98.8 °F (37.1 °C)] 98.8 °F (37.1 °C)  HR:  " [83-91] 83  BP: (120-128)/(69-81) 120/81  Resp:  [18] 18  SpO2:  [99 %] 99 %    Temp:  [97.6 °F (36.4 °C)-98.8 °F (37.1 °C)] 98.8 °F (37.1 °C)  HR:  [83-91] 83  BP: (120-128)/(69-81) 120/81  Resp:  [18] 18  SpO2:  [99 %] 99 %    Mental Status Evaluation:    Appearance:  age appropriate, casually dressed   Behavior:  pleasant, cooperative   Speech:  normal rate, normal volume, normal pitch   Mood:  depressed   Affect:  mood-congruent   Thought Process:  coherent   Thought Content:  no overt delusions   Perceptual Disturbances: denies auditory hallucinations when asked, denies visual hallucinations when asked   Risk Potential: Suicidal Ideation -  None at present  Homicidal Ideation -  None at present  Potential for Aggression - Not at present   Sensorium:  oriented to person, place, and time/date   Memory:  recent and remote memory grossly intact   Consciousness:  alert and awake   Attention/Concentration: attention span and concentration are age appropriate   Insight:  fair   Judgment: fair   Gait/Station: normal gait/station, normal balance   Motor Activity: no abnormal movements       Lab Results: I have reviewed the following results:  Results from the past 24 hours: No results found for this or any previous visit (from the past 24 hours).    Administrative Statements     Counseling / Coordination of Care:  Patient's progress reviewed with nursing staff.  Medication changes reviewed with nursing staff..    HOLLI Torres 02/22/25

## 2025-02-22 NOTE — NURSING NOTE
In room most of shift except up for lunch. No SI or HI noted. Pt states his depression is a 5/10 and anxiety is a 2/4. Routine dose of lexapro administered as ordered. Did not attend any groups. Refused breakfast and 100% of lunch. Took all medication without prompting. Maintained on safe precautions without incident. Will continue to monitor progress and recovery program.

## 2025-02-22 NOTE — PLAN OF CARE
Problem: Alteration in Thoughts and Perception  Goal: Recognize dysfunctional thoughts, communicate reality-based thoughts at the time of discharge  Description: Interventions:  - Provide medication and psycho-education to assist patient in compliance and developing insight into his/her illness   Outcome: Progressing     Problem: Ineffective Coping  Goal: Patient/Family verbalizes awareness of resources  Outcome: Progressing     Problem: Depression  Goal: Refrain from harming self  Description: Interventions:  - Monitor patient closely, per order   - Supervise medication ingestion, monitor effects and side effects   2/22/2025 0727 by Nikki Redding RN  Outcome: Progressing  2/22/2025 0700 by Nikki Redding RN  Outcome: Progressing  Goal: Refrain from isolation  Description: Interventions:  - Develop a trusting relationship   - Encourage socialization   2/22/2025 0727 by Nikki Redding RN  Outcome: Progressing  2/22/2025 0700 by Nikki Redding RN  Outcome: Progressing  Goal: Refrain from self-neglect  Outcome: Progressing     Problem: Anxiety  Goal: Anxiety is at manageable level  Description: Interventions:  - Assess and monitor patient's anxiety level.   - Monitor for signs and symptoms (heart palpitations, chest pain, shortness of breath, headaches, nausea, feeling jumpy, restlessness, irritable, apprehensive).   - Collaborate with interdisciplinary team and initiate plan and interventions as ordered.  - [place] patient to unit/surroundings  - Explain treatment plan  - Encourage participation in care  - Encourage verbalization of concerns/fears  - Identify coping mechanisms  - Assist in developing anxiety-reducing skills  - Administer/offer alternative therapies  - Limit or eliminate stimulants  Outcome: Progressing     Problem: Alteration in Orientation  Goal: Interact with staff daily  Description: Interventions:  - Assess and re-assess patient's level of orientation  - Engage patient in 1 on 1 interactions,  daily, for a minimum of 15 minutes   - Establish rapport/trust with patient   Outcome: Progressing  Goal: Complete daily ADLs, including personal hygiene independently, as able  Description: Interventions:  - Observe, teach, and assist patient with ADLS  Outcome: Progressing

## 2025-02-22 NOTE — NURSING NOTE
Alert, cooperative and visible intermittently. Refused dinner. Took all medication without prompting. Maintained on safe precautions without incident.

## 2025-02-22 NOTE — ASSESSMENT & PLAN NOTE
Continue psychotropic medications including:   Continue upward titration of Clozapine 250 to 275 mg nightly to confer mood stability in addition to addressing psychosis  02/18: , BNP 25, CRP 10, ANC 4.08  02/04:, BNP 10, CRP 9.5,ANC 4.42  CBC w/diff and CK every 2 weeks  Introduce Cogentin 0.5 mg twice daily for EPS signs/symptoms. Upward titration as necessary and as tolerated.   Decrease Haldol 7.5 mg to 5 mg twice daily to address psychosis in setting of daytime somnolence  Continue Lexapro 20 mg daily to address depression and anxiety  Continue Propanolol 10 mg twice daily to address anxiety   Continue Melatonin 9 mg to address insomnia  Continue bowel regimen including Colace and Senna-Docusate sodium 50 mg tablet nightly for constipation    Patient pending ACT team linkage for discharge to his aunt's residence once MA funding comes through from the [facility].  No associated orders from this encounter found during lookback period of 72 hours.

## 2025-02-22 NOTE — NURSING NOTE
Patient requested Nicorette gum at this time. Patient visible in the dining room most of the evening watching television with peers. Patient cooperative with staff. Medication compliant as well. Behaviors controlled and appropriate.. No prn medication requested or required.

## 2025-02-23 PROCEDURE — 99232 SBSQ HOSP IP/OBS MODERATE 35: CPT

## 2025-02-23 RX ADMIN — HALOPERIDOL 5 MG: 5 TABLET ORAL at 08:44

## 2025-02-23 RX ADMIN — DOCUSATE SODIUM 100 MG: 100 CAPSULE, LIQUID FILLED ORAL at 08:44

## 2025-02-23 RX ADMIN — PROPRANOLOL HYDROCHLORIDE 10 MG: 10 TABLET ORAL at 21:37

## 2025-02-23 RX ADMIN — MELATONIN TAB 3 MG 9 MG: 3 TAB at 21:37

## 2025-02-23 RX ADMIN — BENZTROPINE MESYLATE 0.5 MG: 0.5 TABLET ORAL at 08:44

## 2025-02-23 RX ADMIN — NICOTINE POLACRILEX 2 MG: 2 GUM, CHEWING ORAL at 08:44

## 2025-02-23 RX ADMIN — BENZTROPINE MESYLATE 0.5 MG: 0.5 TABLET ORAL at 17:01

## 2025-02-23 RX ADMIN — HALOPERIDOL 5 MG: 5 TABLET ORAL at 17:01

## 2025-02-23 RX ADMIN — PROPRANOLOL HYDROCHLORIDE 10 MG: 10 TABLET ORAL at 08:44

## 2025-02-23 RX ADMIN — CLOZAPINE 275 MG: 25 TABLET ORAL at 21:37

## 2025-02-23 RX ADMIN — NICOTINE POLACRILEX 2 MG: 2 GUM, CHEWING ORAL at 17:01

## 2025-02-23 RX ADMIN — MULTIPLE VITAMINS W/ MINERALS TAB 1 TABLET: TAB ORAL at 08:44

## 2025-02-23 RX ADMIN — NICOTINE POLACRILEX 2 MG: 2 GUM, CHEWING ORAL at 12:44

## 2025-02-23 RX ADMIN — PANTOPRAZOLE SODIUM 20 MG: 20 TABLET, DELAYED RELEASE ORAL at 08:44

## 2025-02-23 RX ADMIN — DOCUSATE SODIUM 100 MG: 100 CAPSULE, LIQUID FILLED ORAL at 17:01

## 2025-02-23 RX ADMIN — LORATADINE 10 MG: 10 TABLET ORAL at 08:43

## 2025-02-23 RX ADMIN — SENNOSIDES AND DOCUSATE SODIUM 2 TABLET: 50; 8.6 TABLET ORAL at 21:37

## 2025-02-23 RX ADMIN — HYDROCHLOROTHIAZIDE 12.5 MG: 12.5 TABLET ORAL at 08:44

## 2025-02-23 RX ADMIN — ESCITALOPRAM OXALATE 20 MG: 10 TABLET, FILM COATED ORAL at 08:44

## 2025-02-23 NOTE — PLAN OF CARE
Problem: Alteration in Thoughts and Perception  Goal: Agree to be compliant with medication regime, as prescribed and report medication side effects  Description: Interventions:  - Offer appropriate PRN medication and supervise ingestion; conduct AIMS, as needed   Outcome: Progressing     Problem: Ineffective Coping  Goal: Demonstrates healthy coping skills  Outcome: Progressing     Problem: Depression  Goal: Refrain from harming self  Description: Interventions:  - Monitor patient closely, per order   - Supervise medication ingestion, monitor effects and side effects   Outcome: Progressing  Goal: Refrain from isolation  Description: Interventions:  - Develop a trusting relationship   - Encourage socialization   Outcome: Progressing  Goal: Refrain from self-neglect  Outcome: Progressing     Problem: Anxiety  Goal: Anxiety is at manageable level  Description: Interventions:  - Assess and monitor patient's anxiety level.   - Monitor for signs and symptoms (heart palpitations, chest pain, shortness of breath, headaches, nausea, feeling jumpy, restlessness, irritable, apprehensive).   - Collaborate with interdisciplinary team and initiate plan and interventions as ordered.  - [place] patient to unit/surroundings  - Explain treatment plan  - Encourage participation in care  - Encourage verbalization of concerns/fears  - Identify coping mechanisms  - Assist in developing anxiety-reducing skills  - Administer/offer alternative therapies  - Limit or eliminate stimulants  Outcome: Progressing     Problem: Alteration in Orientation  Goal: Treatment Goal: Demonstrate a reduction of confusion and improved orientation to person, place, time and/or situation upon discharge, according to optimum baseline/ability  Outcome: Progressing  Goal: Interact with staff daily  Description: Interventions:  - Assess and re-assess patient's level of orientation  - Engage patient in 1 on 1 interactions, daily, for a minimum of 15 minutes   -  Establish rapport/trust with patient   Outcome: Progressing  Goal: Express concerns related to confused thinking related to:  Description: Interventions:  - Encourage patient to express feelings, fears, frustrations, hopes  - Assign consistent caregivers   - [place]/re-orient patient as needed  - Allow comfort items, as appropriate  - Provide visual cues, signs, etc.   Outcome: Progressing     Problem: Alteration in Thoughts and Perception  Goal: Treatment Goal: Gain control of psychotic behaviors/thinking, reduce/eliminate presenting symptoms and demonstrate improved reality functioning upon discharge  Outcome: Not Progressing

## 2025-02-23 NOTE — ASSESSMENT & PLAN NOTE
Continue psychotropic medications including:   Continue upward titration of Clozapine 250 to 275 mg nightly to confer mood stability in addition to addressing psychosis  02/18: , BNP 25, CRP 10, ANC 4.08  02/04:, BNP 10, CRP 9.5,ANC 4.42  CBC w/diff and CK every 2 weeks  Introduce Cogentin 0.5 mg twice daily for EPS signs/symptoms. Upward titration as necessary and as tolerated.   Decrease Haldol 7.5 mg to 5 mg twice daily to address psychosis in setting of daytime somnolence  Continue Lexapro 20 mg daily to address depression and anxiety  Continue Propanolol 10 mg twice daily to address anxiety   Continue Melatonin 9 mg to address insomnia  Continue bowel regimen including Colace and Senna-Docusate sodium 50 mg tablet nightly for constipation    Patient pending ACT team linkage for discharge to his aunt's residence once MA funding comes through from the [facility].  No associated orders from this encounter found during lookback period of 72 hours.

## 2025-02-23 NOTE — NURSING NOTE
Alert, cooperative and in room most of shift out for lunch. No SI or HI noted. Pt states he has depression, and anxiety. Routine dose of lexapro administered as ordered. Denies pain. Attended coping skills. Refused breakfast and 100% of lunch. Took all medication without prompting. Maintained on safe precautions without incident. Will continue to monitor progress and recovery program.

## 2025-02-23 NOTE — NURSING NOTE
Weekly wellness assessment completed. Pt lung sounds clear in all lung fields. Trace edema noted to b/l lower ext. Bowel sounds +x4. B/l pedal pulses papable. Skin intact, except noted with dry feet b/l. Skin warm and color within normal limits for patient.

## 2025-02-23 NOTE — PROGRESS NOTES
Progress Note - Behavioral Health   Name: Alberto Berumen 29 y.o. male I MRN: [id number]  Unit/Bed#: [facility] 112-02 I Date of Admission: 3/29/2024   Date of Service: 2/23/2025 I Hospital Day: 331     Assessment & Plan  Schizoaffective disorder, bipolar type (HCC)    Continue psychotropic medications including:   Continue upward titration of Clozapine 250 to 275 mg nightly to confer mood stability in addition to addressing psychosis  02/18: , BNP 25, CRP 10, ANC 4.08  02/04:, BNP 10, CRP 9.5,ANC 4.42  CBC w/diff and CK every 2 weeks  Introduce Cogentin 0.5 mg twice daily for EPS signs/symptoms. Upward titration as necessary and as tolerated.   Decrease Haldol 7.5 mg to 5 mg twice daily to address psychosis in setting of daytime somnolence  Continue Lexapro 20 mg daily to address depression and anxiety  Continue Propanolol 10 mg twice daily to address anxiety   Continue Melatonin 9 mg to address insomnia  Continue bowel regimen including Colace and Senna-Docusate sodium 50 mg tablet nightly for constipation    Patient pending ACT team linkage for discharge to his aunt's residence once MA funding comes through from the [facility].  No associated orders from this encounter found during lookback period of 72 hours.    Chronic idiopathic constipation  See above  No associated orders from this encounter found during lookback period of 72 hours.  GERD (gastroesophageal reflux disease)  Continue medical management  No associated orders from this encounter found during lookback period of 72 hours.  Tobacco abuse  Continue supportive therapy including promotion of smoking cessation    No associated orders from this encounter found during lookback period of 72 hours.  Elevated hemoglobin A1c  Continue medical management  No associated orders from this encounter found during lookback period of 72 hours.  Class 2 obesity in adult  Continue medical management  No associated orders from this encounter found  during lookback period of 72 hours.    Primary hypertension  Continue medical management  No associated orders from this encounter found during lookback period of 72 hours.    Current medications:  Current Facility-Administered Medications   Medication Dose Route Frequency Provider Last Rate    acetaminophen  650 mg Oral Q4H PRN Jordan C Holter, DO      acetaminophen  650 mg Oral Q6H PRN HOLLI Lion      aluminum-magnesium hydroxide-simethicone  30 mL Oral Q4H PRN Jordan C Holter, DO      amLODIPine  5 mg Oral Daily HOLLI Lion      Artificial Tears  1 drop Both Eyes Q3H PRN Jordan C Holter, DO      haloperidol lactate  2.5 mg Intramuscular Q4H PRN Max 4/day HOLLI Lion      And    LORazepam  1 mg Intramuscular Q4H PRN Max 4/day HOLLI Lion      And    benztropine  0.5 mg Intramuscular Q4H PRN Max 4/day HOLLI Lion      haloperidol lactate  5 mg Intramuscular Q4H PRN Max 4/day HOLLI Lion      And    LORazepam  2 mg Intramuscular Q4H PRN Max 4/day HOLLI Lion      And    benztropine  1 mg Intramuscular Q4H PRN Max 4/day HOLLI Lion      benztropine  0.5 mg Oral BID Jordan C Holter, DO      bisacodyl  10 mg Rectal Daily PRN HOLLI Lion      calcium carbonate  500 mg Oral BID PRN HOLLI Monge      cloZAPine  275 mg Oral HS Jordan C Holter, DO      hydrOXYzine HCL  50 mg Oral Q6H PRN Max 4/day Jordan C Holter, DO      Or    diphenhydrAMINE  50 mg Intramuscular Q6H PRN Jordan C Holter, DO      hydrOXYzine HCL  50 mg Oral Q6H PRN Max 4/day HOLLI Lion      Or    diphenhydrAMINE  50 mg Intramuscular Q6H PRN HOLLI Lion      diphenhydrAMINE-zinc acetate   Topical BID PRN HOLLI Lion      docusate sodium  100 mg Oral BID Jourdan Dickens, DO      escitalopram  20 mg Oral Daily HOLLI Lion      famotidine  20 mg Oral BID PRN HOLLI Monge      fluticasone  1 spray Each Nare Daily PRN Eileen Holliday  HOLLI Jensen      haloperidol  1 mg Oral Q6H PRN HOLLI Lion      haloperidol  2.5 mg Oral Q4H PRN Max 4/day HOLLI Lion      haloperidol  5 mg Oral Q4H PRN Max 4/day HOLLI Lion      haloperidol  5 mg Oral BID [facility] Cresencioter, DO      hydroCHLOROthiazide  12.5 mg Oral Daily Pia Mantilla, HOLLI      hydrocortisone   Topical 4x Daily PRN HOLLI Lion      hydrOXYzine HCL  100 mg Oral Q6H PRN Max 4/day [facility] Holter, DO      Or    LORazepam  2 mg Intramuscular Q6H PRN [facility] Cresencioter, DO      hydrOXYzine HCL  100 mg Oral Q6H PRN Max 4/day HOLLI Lion      Or    LORazepam  2 mg Intramuscular Q6H PRN HOLLI Lion      hydrOXYzine HCL  25 mg Oral Q6H PRN Max 4/day [facility] Holter, DO      ibuprofen  600 mg Oral Q8H PRN HOLLI Lion      influenza vaccine  0.5 mL Intramuscular Once Bora Rosario MD      loratadine  10 mg Oral Daily Brina Guillen MD      magnesium hydroxide  30 mL Oral Once Jourdan Dickens, DO      melatonin  3 mg Oral HS PRN Bora Rosario MD      melatonin  9 mg Oral HS Bora Rosario MD      methocarbamol  500 mg Oral Q6H PRN HOLLI Lion      multivitamin-minerals  1 tablet Oral Daily Eileen CherryHOLLI Jimenez      nicotine polacrilex  2 mg Oral Q4H PRN Bora Rosario MD      OLANZapine  5 mg Oral Q4H PRN Max 3/day [facility] Holter, DO      Or    OLANZapine  2.5 mg Intramuscular Q4H PRN Max 3/day [facility] Holter, DO      OLANZapine  5 mg Oral Q3H PRN Max 3/day [facility] Holter, DO      Or    OLANZapine  5 mg Intramuscular Q3H PRN Max 3/day [facility] Holter, DO      OLANZapine  2.5 mg Oral Q4H PRN Max 6/day [facility] Holter, DO      ondansetron  4 mg Oral Q6H PRN HOLLI Lion      [facility]oprazole  [facility]      polyethylene glycol  17 g Oral Daily PRN HOLLI Ghotra      polyethylene glycol  17 g Oral Daily Jourdan Dickens, DO      propranolol  10 mg Oral Q12H KAYLIE HOLLI Lion      saliva substitute  5  "spray Mouth/Throat 4x Daily PRN Mary Jo Reich PA-C      senna-docusate sodium  1 tablet Oral Daily PRN Jordan C Holter, DO      senna-docusate sodium  2 tablet Oral HS Jourdan Dickens,       traZODone  50 mg Oral HS PRN HOLLI Lion      white petrolatum-mineral oil   Topical TID PRN HOLLI Lion          Risks/Benefits of Treatment:     Risks, benefits, and possible side effects of medications explained to patient and patient verbalizes understanding and agreement for treatment.    Progress Toward Goals: no significant Improvement today    Treatment Planning:     All current active medications have been reviewed.  Continue to monitor response to treatment and assess for potential side effects of medications.  Encourage group therapy, milieu therapy and occupational therapy  Collaboration with medical service for medical comorbidities as indicated.  Behavioral Health checks for safety monitoring.  Estimated Discharge Day: 3/31/2025     Subjective     Behavior over the last 24 hours: unchanged.    Per nursing, Alberto has been with ongoing depression and anxiety.  He refused breakfast and dinner and ate 100% of lunch.  He attended 2/8 groups.       Today, he is laying in bed with covers over head and speaks through the covers to answer assessment questions.  He states mood as \"pretty good\" and rates depression 6.5 on a 0-10 scale with 10 being most severe.  He reports recent triggers for depression as \"pretty much everything\".  He denies suicidal or homicidal ideation.  He rates anxiety 3 on a 0-10 scale with 10 being most severe.  He denies any auditory or visual hallucinations and no overt delusional content elicited during conversation.  He reports decreased appetite as \"I just wasn't hungry\".  He reports he is sleeping well.  He denies any further concerns.      Sleep: normal  Appetite: decreased  Medication side effects: No  ROS: review of systems as noted above in HPI/Subjective report, all " other systems are negative    Objective :  Temp:  [97.8 °F (36.6 °C)-98.7 °F (37.1 °C)] 97.8 °F (36.6 °C)  HR:  [] 82  BP: (111-128)/(69-75) 111/69  Resp:  [18] 18  SpO2:  [99 %] 99 %  O2 Device: None (Room air)    Temp:  [97.8 °F (36.6 °C)-98.7 °F (37.1 °C)] 97.8 °F (36.6 °C)  HR:  [] 82  BP: (111-128)/(69-75) 111/69  Resp:  [18] 18  SpO2:  [99 %] 99 %  O2 Device: None (Room air)    Mental Status Evaluation:    Appearance:  casually dressed, fair hygiene   Behavior:  pleasant, laying in bed with covers over head   Speech:  scant   Mood:  depressed   Affect:  mood-congruent   Thought Process:  concrete, poverty of thought   Thought Content:  no overt delusions   Perceptual Disturbances: denies auditory hallucinations when asked, denies visual hallucinations when asked   Risk Potential: Suicidal Ideation -  None at present  Homicidal Ideation -  None at present  Potential for Aggression - Not at present   Sensorium:  oriented to person, place, and time/date   Memory:  recent and remote memory grossly intact   Consciousness:  alert and awake   Attention/Concentration: attention span and concentration are age appropriate   Insight:  limited   Judgment: limited   Gait/Station: in bed   Motor Activity: no abnormal movements       Lab Results: I have reviewed the following results:  Results from the past 24 hours: No results found for this or any previous visit (from the past 24 hours).    Administrative Statements     Counseling / Coordination of Care:  Patient's progress reviewed with nursing staff.  Medication changes reviewed with nursing staff..    HOLLI Torres 02/23/25

## 2025-02-23 NOTE — NURSING NOTE
Patient had an uneventful shift. Visible with peers all evening. Cooperative and pleasant. Behaviors were controlled and appropriate. Medication compliant as well. Offered no complaints. No prn medications requested or required.

## 2025-02-24 PROCEDURE — 99232 SBSQ HOSP IP/OBS MODERATE 35: CPT | Performed by: PSYCHIATRY & NEUROLOGY

## 2025-02-24 RX ADMIN — AMLODIPINE BESYLATE 5 MG: 5 TABLET ORAL at 08:37

## 2025-02-24 RX ADMIN — BENZTROPINE MESYLATE 0.5 MG: 0.5 TABLET ORAL at 08:38

## 2025-02-24 RX ADMIN — HALOPERIDOL 5 MG: 5 TABLET ORAL at 17:18

## 2025-02-24 RX ADMIN — MELATONIN TAB 3 MG 9 MG: 3 TAB at 21:18

## 2025-02-24 RX ADMIN — NICOTINE POLACRILEX 2 MG: 2 GUM, CHEWING ORAL at 21:18

## 2025-02-24 RX ADMIN — SENNOSIDES AND DOCUSATE SODIUM 2 TABLET: 50; 8.6 TABLET ORAL at 21:18

## 2025-02-24 RX ADMIN — CLOZAPINE 275 MG: 25 TABLET ORAL at 21:18

## 2025-02-24 RX ADMIN — NICOTINE POLACRILEX 2 MG: 2 GUM, CHEWING ORAL at 17:18

## 2025-02-24 RX ADMIN — HYDROCHLOROTHIAZIDE 12.5 MG: 12.5 TABLET ORAL at 08:37

## 2025-02-24 RX ADMIN — PANTOPRAZOLE SODIUM 20 MG: 20 TABLET, DELAYED RELEASE ORAL at 08:37

## 2025-02-24 RX ADMIN — DOCUSATE SODIUM 100 MG: 100 CAPSULE, LIQUID FILLED ORAL at 08:37

## 2025-02-24 RX ADMIN — MULTIPLE VITAMINS W/ MINERALS TAB 1 TABLET: TAB ORAL at 08:38

## 2025-02-24 RX ADMIN — HALOPERIDOL 5 MG: 5 TABLET ORAL at 08:37

## 2025-02-24 RX ADMIN — BENZTROPINE MESYLATE 0.5 MG: 0.5 TABLET ORAL at 17:17

## 2025-02-24 RX ADMIN — NICOTINE POLACRILEX 2 MG: 2 GUM, CHEWING ORAL at 12:22

## 2025-02-24 RX ADMIN — ESCITALOPRAM OXALATE 20 MG: 10 TABLET, FILM COATED ORAL at 08:37

## 2025-02-24 RX ADMIN — PROPRANOLOL HYDROCHLORIDE 10 MG: 10 TABLET ORAL at 08:37

## 2025-02-24 RX ADMIN — DOCUSATE SODIUM 100 MG: 100 CAPSULE, LIQUID FILLED ORAL at 17:17

## 2025-02-24 RX ADMIN — LORATADINE 10 MG: 10 TABLET ORAL at 08:37

## 2025-02-24 NOTE — SOCIAL WORK
This writer emailed [facility] CM and ACT Teams requesting an update on his status for ACT services.

## 2025-02-24 NOTE — ASSESSMENT & PLAN NOTE
Continue psychotropic medications including:   Continue Clozapine 275 mg nightly to confer mood stability in addition to addressing psychosis. Continue upward titration.   02/18: , BNP 25, CRP 10, ANC 4.08  02/04:, BNP 10, CRP 9.5,ANC 4.42  CBC w/diff and CK every 2 weeks  Introduce Cogentin 0.5 mg twice daily for EPS signs/symptoms. Upward titration as necessary and as tolerated.   Continue Haldol 5 mg twice daily to address psychosis   Continue Lexapro 20 mg daily to address depression and anxiety  Continue Propanolol 10 mg twice daily to address anxiety   Continue Melatonin 9 mg to address insomnia  Continue bowel regimen including Colace and Senna-Docusate sodium 50 mg tablet nightly for constipation    Patient pending ACT team linkage for discharge to his aunt's residence once MA funding comes through from the [facility].  No associated orders from this encounter found during lookback period of 72 hours.

## 2025-02-24 NOTE — PLAN OF CARE
Problem: Alteration in Thoughts and Perception  Goal: Agree to be compliant with medication regime, as prescribed and report medication side effects  Description: Interventions:  - Offer appropriate PRN medication and supervise ingestion; conduct AIMS, as needed   Outcome: Progressing     Problem: Ineffective Coping  Goal: Demonstrates healthy coping skills  Outcome: Progressing  Goal: Participates in unit activities  Description: Interventions:  - Provide therapeutic environment   - Provide required programming   - Redirect inappropriate behaviors   Outcome: Progressing     Problem: Depression  Goal: Verbalize thoughts and feelings  Description: Interventions:  - Assess and re-assess patient's level of risk   - Engage patient in 1:1 interactions, daily, for a minimum of 15 minutes   - Encourage patient to express feelings, fears, frustrations, hopes   Outcome: Progressing  Goal: Refrain from harming self  Description: Interventions:  - Monitor patient closely, per order   - Supervise medication ingestion, monitor effects and side effects   Outcome: Progressing  Goal: Refrain from isolation  Description: Interventions:  - Develop a trusting relationship   - Encourage socialization   Outcome: Progressing  Goal: Refrain from self-neglect  Outcome: Progressing     Problem: Alteration in Thoughts and Perception  Goal: Treatment Goal: Gain control of psychotic behaviors/thinking, reduce/eliminate presenting symptoms and demonstrate improved reality functioning upon discharge  Outcome: Not Progressing  Goal: Recognize dysfunctional thoughts, communicate reality-based thoughts at the time of discharge  Description: Interventions:  - Provide medication and psycho-education to assist patient in compliance and developing insight into his/her illness   Outcome: Not Progressing     Problem: Depression  Goal: Treatment Goal: Demonstrate behavioral control of depressive symptoms, verbalize feelings of improved mood/affect, and  adopt new coping skills prior to discharge  Outcome: Not Progressing

## 2025-02-24 NOTE — PROGRESS NOTES
Progress Note - Behavioral Health   Name: Alberto Berumen 29 y.o. male I MRN: [id number]  Unit/Bed#: [facility] 112-02 I Date of Admission: 3/29/2024   Date of Service: 2/24/2025 I Hospital Day: 332     Assessment & Plan  Schizoaffective disorder, bipolar type (HCC)  Continue psychotropic medications including:   Continue Clozapine 275 mg nightly to confer mood stability in addition to addressing psychosis. Continue upward titration.   02/18: , BNP 25, CRP 10, ANC 4.08  02/04:, BNP 10, CRP 9.5,ANC 4.42  CBC w/diff and CK every 2 weeks  Introduce Cogentin 0.5 mg twice daily for EPS signs/symptoms. Upward titration as necessary and as tolerated.   Continue Haldol 5 mg twice daily to address psychosis   Continue Lexapro 20 mg daily to address depression and anxiety  Continue Propanolol 10 mg twice daily to address anxiety   Continue Melatonin 9 mg to address insomnia  Continue bowel regimen including Colace and Senna-Docusate sodium 50 mg tablet nightly for constipation    Patient pending ACT team linkage for discharge to his aunt's residence once [facility]  No associated orders from this encounter found during lookback period of 72 hours.    Chronic idiopathic constipation  See above  No associated orders from this encounter found during lookback period of 72 hours.  GERD (gastroesophageal reflux disease)  Continue medical management  No associated orders from this encounter found during lookback period of 72 hours.  Tobacco abuse  Continue supportive therapy including promotion of smoking cessation    No associated orders from this encounter found during lookback period of 72 hours.  Elevated hemoglobin A1c  Continue medical management  No associated orders from this encounter found during lookback period of 72 hours.  Class 2 obesity in adult  Continue medical management  No associated orders from this encounter found during lookback period of 72 hours.    Primary  "hypertension  Continue medical management  No associated orders from this encounter found during lookback period of 72 hours.    Plan:  Continue prescribed psychotropic medication regimen  Currently on 201 commitment status  Continue to promote patient participation in therapeutic milieu.  Continue medical management per medicine.  Continue behavioral health checks q.15 minutes.   Continue vitals per behavioral health unit protocol.  Safety: Safety and communication plan established to target dynamic risk factors discussed above.   Discharge and disposition: Pending [facility] funding and coordination of ACT services, planning discharge to aunt's home    SUBJECTIVE     Patient evaluated this a.m. for continuity of care. Today, Alberto feels \"fine.\" He states improved mood over the weekend. Nursing agreed he was more bright and social this weekend. He reports feeling less anxious today but states his depression is \"the same.\" Continues to endorse auditory hallucinations that are derogatory in nature and threaten to kill him/family. Otherwise the AH remain unchanged and he denies command or visual hallucinations.    When asked about passive suicidal ideation which was previously expressed to this writer last week, Alberto states \"he has not been feeling that way lately\" and denies any suicidal ideation, homicidal ideation, or self-injurious thoughts/behaviors.      Alberto reports pain in his joints which he feels he should \"not have at this age.\" Patient educated on PRN medications for pain and the benefits of exercising/getting out of bed. Reports his body spasms have improved with the addition of Cogentin. Denies any other medication side effects and is compliant with his psychiatric medications. Reports normal bowel movements without difficulty and continues to refuse miralax. Eating 2/3 meals, adequate hygiene, and sleeping without difficulty.     PSYCHIATRIC REVIEW OF SYSTEMS     Behavior over the last 24 hours: " mild improvement  Sleep: normal  Appetite: normal  Medication side effects: Yes, mild body spasms which have improved on cogentin    Progress Toward Goals: Limited progress, as evidenced by their participation (or lack thereof) in individual, social and therapeutic milieu in addition to adherence to their medication regimen.  Patient Acceptance: Fair  Patient Understanding: Fair   Patient Involvement in Reintegration Process: Remains in contact with his sisters and aunt over the phone.  Patient's Therapeutic Relationship with Psychiatrist: Trusting  Patient's Optimism Regarding Recovery: Continues to struggle with auditory hallucinations but overall stable and improved socialization. Pending coordination and funding of ACT services, plan to discharge to aunt's home.  Group Attendance: 5/9    REVIEW OF SYSTEMS   Review of systems: Joint pain (knees mainly)    OBJECTIVE     Vital Signs in Past 24 Hours:  Temp:  [97.6 °F (36.4 °C)-98.3 °F (36.8 °C)] 98.3 °F (36.8 °C)  HR:  [] 84  BP: (124-139)/(64-72) 124/64  Resp:  [18] 18  SpO2:  [96 %-100 %] 100 %  O2 Device: None (Room air)    Intake/Output in Past 24 hours:  No intake/output data recorded.  No intake/output data recorded.        Laboratory Results:  I have personally reviewed all pertinent laboratory/tests results.  Most Recent Labs:   Lab Results   Component Value Date    WBC 7.82 02/18/2025    RBC 5.80 (H) 02/18/2025    HGB 13.4 02/18/2025    HCT 45.1 02/18/2025     02/18/2025    RDW 14.0 02/18/2025    NEUTROABS 4.08 02/18/2025    SODIUM 140 10/16/2024    K 3.8 10/16/2024     10/16/2024    CO2 29 10/16/2024    BUN 9 10/16/2024    CREATININE 0.91 10/16/2024    GLUC 94 10/16/2024    GLUF 94 10/16/2024    CALCIUM 9.5 10/16/2024    AST 26 09/30/2024    ALT 42 09/30/2024    ALKPHOS 64 09/30/2024    TP 6.6 09/30/2024    ALB 3.8 09/30/2024    TBILI 0.47 09/30/2024    CHOLESTEROL 156 03/14/2024    HDL 44 03/14/2024    TRIG 133 03/14/2024    LDLCALC 85  03/14/2024    NONHDLC 112 03/14/2024    LITHIUM 0.61 01/09/2024    [id number] 1.062 11/15/2023    HGBA1C 5.0 04/01/2024    EAG 97 04/01/2024       Behavioral Health Medications: all current active meds have been reviewed.    Current Facility-Administered Medications   Medication Dose Route Frequency Provider Last Rate    acetaminophen  650 mg Oral Q4H PRN Jordan C Holter,       acetaminophen  650 mg Oral Q6H PRN HOLLI Lion      aluminum-magnesium hydroxide-simethicone  30 mL Oral Q4H PRN Jordan C Holter,       amLODIPine  5 mg Oral Daily HOLLI Lion      Artificial Tears  1 drop Both Eyes Q3H PRN Jordan C Holter, DO      haloperidol lactate  2.5 mg Intramuscular Q4H PRN Max 4/day HOLLI Lion      And    LORazepam  1 mg Intramuscular Q4H PRN Max 4/day HOLLI Lion      And    benztropine  0.5 mg Intramuscular Q4H PRN Max 4/day HOLLI Lion      haloperidol lactate  5 mg Intramuscular Q4H PRN Max 4/day HOLLI Lion      And    LORazepam  2 mg Intramuscular Q4H PRN Max 4/day HOLLI Lion      And    benztropine  1 mg Intramuscular Q4H PRN Max 4/day HOLLI Lion      benztropine  0.5 mg Oral BID Jordan C Holter,       bisacodyl  10 mg Rectal Daily PRN HOLLI Lion      calcium carbonate  500 mg Oral BID PRN HOLLI Monge      cloZAPine  275 mg Oral HS Jordan C Holter, DO      hydrOXYzine HCL  50 mg Oral Q6H PRN Max 4/day Jordan C Holter,       Or    diphenhydrAMINE  50 mg Intramuscular Q6H PRN Jordan C Holter, DO      hydrOXYzine HCL  50 mg Oral Q6H PRN Max 4/day HOLLI Lion      Or    diphenhydrAMINE  50 mg Intramuscular Q6H PRN HOLLI Lion      diphenhydrAMINE-zinc acetate   Topical BID PRN HOLLI Lion      docusate sodium  100 mg Oral BID Jourdan Dickens, DO      escitalopram  20 mg Oral Daily Eveline Hangey, CRNP      famotidine  20 mg Oral BID PRN HOLLI Monge      fluticasone  1 spray Each Nare  Daily PRN HOLLI Monge      haloperidol  1 mg Oral Q6H PRN HOLLI Lion      haloperidol  2.5 mg Oral Q4H PRN Max 4/day HOLLI Lion      haloperidol  5 mg Oral Q4H PRN Max 4/day HOLLI Lion      haloperidol  5 mg Oral BID [facility] Holter, DO      hydroCHLOROthiazide  12.5 mg Oral Daily Pia Mantilla, HOLLI      hydrocortisone   Topical 4x Daily PRN HOLLI Lion      hydrOXYzine HCL  100 mg Oral Q6H PRN Max 4/day [facility] Holter, DO      Or    LORazepam  2 mg Intramuscular Q6H PRN [facility] Holter, DO      hydrOXYzine HCL  100 mg Oral Q6H PRN Max 4/day HOLLI Lion      Or    LORazepam  2 mg Intramuscular Q6H PRN HOLLI Lion      hydrOXYzine HCL  25 mg Oral Q6H PRN Max 4/day [facility] Holter, DO      ibuprofen  600 mg Oral Q8H PRN HOLLI Lion      influenza vaccine  0.5 mL Intramuscular Once Bora Rosario MD      loratadine  10 mg Oral Daily Brina Guillen MD      magnesium hydroxide  30 mL Oral Once Jourdan Dickens,       melatonin  3 mg Oral HS PRN Bora Rosario MD      melatonin  9 mg Oral HS Bora Rosario MD      methocarbamol  500 mg Oral Q6H PRN HOLLI Lion      multivitamin-minerals  1 tablet Oral Daily EileenHOLLI Cummins      nicotine polacrilex  2 mg Oral Q4H PRN Bora Rosario MD      OLANZapine  5 mg Oral Q4H PRN Max 3/day [facility] Holter, DO      Or    OLANZapine  2.5 mg Intramuscular Q4H PRN Max 3/day [facility] Holter, DO      OLANZapine  5 mg Oral Q3H PRN Max 3/day [facility] Holter, DO      Or    OLANZapine  5 mg Intramuscular Q3H PRN Max 3/day [facility] Holter, DO      OLANZapine  2.5 mg Oral Q4H PRN Max 6/day [facility] Holter, DO      ondansetron  4 mg Oral Q6H PRN HOLLI Lion      pantoprazole  [facility]      polyethylene glycol  17 g Oral Daily PRN HOLLI Ghotra      polyethylene glycol  17 g Oral Daily Jourdan Dickens, DO      propranolol  10 mg Oral Q12H KAYLIE HOLLI Lion       "saliva substitute  5 spray Mouth/Throat 4x Daily PRN Mary Jo Reich PA-C      senna-docusate sodium  1 tablet Oral Daily PRN Jordan C Holter, DO      senna-docusate sodium  2 tablet Oral HS Jourdan Dickens,       traZODone  50 mg Oral HS PRN HOLLI Lion      white petrolatum-mineral oil   Topical TID PRN HOLLI Lion         Risks, benefits and possible side effects of Medications:   Risks, benefits, and possible side effects of medications explained to patient and patient verbalizes understanding and agreement for treatment.    Dual Antipsychotic Rationale: Patient requires dual antipsychotic therapy due to multiple failed medication trials and inability to control symptoms on a single agent.     Mental Status Evaluation:  Appearance:  Alert, age appropriate, casually dressed, adequate grooming, in no acute distress   Behavior:  pleasant, cooperative, calm, minimal eye contact   Speech:  normal rate and volume, scant   Mood:  \"Fine\"   Affect:  blunted, depressed   Thought Process:  organized, linear, normal rate of thoughts   Associations: concrete associations   Thought Content:  mild paranoia   Perceptual Disturbances: Ongoing auditory hallucinations that are derogatory in nature threatening to kill him/family no visual hallucinations, does not appear responding to internal stimuli   Risk Potential: Suicidal ideation - Denies  Homicidal ideation - Denies  Potential for aggression - Not at present   Sensorium:  oriented to person, place, and time/date   Memory:  recent and remote memory grossly intact   Consciousness:  alert and awake   Attention/Concentration: attention span and concentration appear shorter than expected for age   Insight:  partial   Judgment: limited   Gait/Station: in bed   Motor Activity: no abnormal movements     Recommended Treatment:   See above for assessment and plan.    Counseling/Coordination of Care    Total unit time spent today was greater than 15 minutes. Greater " than 50% of total time was spent with the patient and/or patient's relatives and/or coordination of patient's care.     Patient's rights, confidentiality, exceptions to confidentiality, use of electronic medical record including appropriate staff access to medical record regarding behavioral health services and consent to treatment were reviewed.    Note Share:     This note was not shared with the patient due to reasonable likelihood of causing patient harm    Sharon Lilly, PA-S

## 2025-02-24 NOTE — NURSING NOTE
"Patient stated \"One of the medications I am on is making my joints hurt.\" Patient denied other symptoms. He asked to be given the medication that he was on before the [place]. Provider made aware.  "

## 2025-02-24 NOTE — NURSING NOTE
"Patient is pleasant and cooperative. He is compliant with medications except refused miralax. He has auditory hallucinations. He stated \"I am good .\" When asked how he was today. Patient has been visible and social with peers and staff. Patient appears brighter today. He is smiling more.  He refused breakfast however 100 of lunch.   "

## 2025-02-24 NOTE — NURSING NOTE
Patient visible all evening.  Brighter affect noted. In the dining room most of this shift. Social with select peers Comes to staff to make his needs known. Offered no complaints Cooperative and pleasant upon approach. Medication compliant.

## 2025-02-24 NOTE — PROGRESS NOTES
02/24/25 0902   Team Meeting   Meeting Type Daily Rounds   Team Members Present   Team Members Present Physician;Nurse;;Other (Discipline and Name)   Physician Team Member Holter   Nursing Team Member Thaddeus   Social Work Team Member Jose J ORTEGA   Other (Discipline and Name) Wang PCM   Patient/Family Present   Patient Present No   Patient's Family Present No     Groups Participation  5/9.   Patient's compliant with medications. He's engaged in his treatment. Bright affect. Social with his peers. Reports depression, anxiety and AH.

## 2025-02-25 PROCEDURE — 99232 SBSQ HOSP IP/OBS MODERATE 35: CPT | Performed by: PSYCHIATRY & NEUROLOGY

## 2025-02-25 RX ADMIN — HALOPERIDOL 5 MG: 5 TABLET ORAL at 17:06

## 2025-02-25 RX ADMIN — SENNOSIDES AND DOCUSATE SODIUM 2 TABLET: 50; 8.6 TABLET ORAL at 21:25

## 2025-02-25 RX ADMIN — MULTIPLE VITAMINS W/ MINERALS TAB 1 TABLET: TAB ORAL at 09:05

## 2025-02-25 RX ADMIN — DOCUSATE SODIUM 100 MG: 100 CAPSULE, LIQUID FILLED ORAL at 17:06

## 2025-02-25 RX ADMIN — BENZTROPINE MESYLATE 0.5 MG: 0.5 TABLET ORAL at 17:06

## 2025-02-25 RX ADMIN — CLOZAPINE 275 MG: 25 TABLET ORAL at 21:24

## 2025-02-25 RX ADMIN — DOCUSATE SODIUM 100 MG: 100 CAPSULE, LIQUID FILLED ORAL at 09:02

## 2025-02-25 RX ADMIN — NICOTINE POLACRILEX 2 MG: 2 GUM, CHEWING ORAL at 17:06

## 2025-02-25 RX ADMIN — NICOTINE POLACRILEX 2 MG: 2 GUM, CHEWING ORAL at 09:15

## 2025-02-25 RX ADMIN — LORATADINE 10 MG: 10 TABLET ORAL at 09:02

## 2025-02-25 RX ADMIN — BENZTROPINE MESYLATE 0.5 MG: 0.5 TABLET ORAL at 09:02

## 2025-02-25 RX ADMIN — PROPRANOLOL HYDROCHLORIDE 10 MG: 10 TABLET ORAL at 09:01

## 2025-02-25 RX ADMIN — NICOTINE POLACRILEX 2 MG: 2 GUM, CHEWING ORAL at 12:43

## 2025-02-25 RX ADMIN — ESCITALOPRAM OXALATE 20 MG: 10 TABLET, FILM COATED ORAL at 09:01

## 2025-02-25 RX ADMIN — PROPRANOLOL HYDROCHLORIDE 10 MG: 10 TABLET ORAL at 21:25

## 2025-02-25 RX ADMIN — NICOTINE POLACRILEX 2 MG: 2 GUM, CHEWING ORAL at 21:24

## 2025-02-25 RX ADMIN — HALOPERIDOL 5 MG: 5 TABLET ORAL at 09:02

## 2025-02-25 RX ADMIN — PANTOPRAZOLE SODIUM 20 MG: 20 TABLET, DELAYED RELEASE ORAL at 09:02

## 2025-02-25 RX ADMIN — HYDROCHLOROTHIAZIDE 12.5 MG: 12.5 TABLET ORAL at 09:01

## 2025-02-25 RX ADMIN — MELATONIN TAB 3 MG 9 MG: 3 TAB at 21:24

## 2025-02-25 NOTE — SOCIAL WORK
This writer met with patient for an individual session. Patient reports he continues to struggle with depression and voices. He reports he's using his coping skills to mange the symptoms. Patient discussed his sister/rep payee and inconsistence with her explanation of what happened with his SSI. This writer discussed support patient will receive from his Aunt Mala.   This writer will continue to assist patient with exploring coping skills to manage his symptoms.
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
## 2025-02-25 NOTE — NURSING NOTE
Patient is cooperative. He was more visible later in the day. He seemed brighter this afternoon. Playing instruments during group, laughing and smiling. He is compliant with medications. He reports auditory hallucinations. He reports having no appetite at dinner time to writer. He is asking to have ensure ordered. Writer stated she would address it with provider in the morning. He accepted that. Patient then asked staff for candy after stating he has no appetite. Patient slept through breakfast and ate 100 of lunch. Patient attended groups.

## 2025-02-25 NOTE — PLAN OF CARE
Problem: Alteration in Thoughts and Perception  Goal: Agree to be compliant with medication regime, as prescribed and report medication side effects  Description: Interventions:  - Offer appropriate PRN medication and supervise ingestion; conduct AIMS, as needed   Outcome: Progressing     Problem: Ineffective Coping  Goal: Participates in unit activities  Description: Interventions:  - Provide therapeutic environment   - Provide required programming   - Redirect inappropriate behaviors   Outcome: Progressing  Goal: Patient/Family participate in treatment and DC plans  Description: Interventions:  - Provide therapeutic environment  Outcome: Progressing  Goal: Patient/Family verbalizes awareness of resources  Outcome: Progressing     Problem: Depression  Goal: Treatment Goal: Demonstrate behavioral control of depressive symptoms, verbalize feelings of improved mood/affect, and adopt new coping skills prior to discharge  Outcome: Progressing  Goal: Verbalize thoughts and feelings  Description: Interventions:  - Assess and re-assess patient's level of risk   - Engage patient in 1:1 interactions, daily, for a minimum of 15 minutes   - Encourage patient to express feelings, fears, frustrations, hopes   Outcome: Progressing  Goal: Refrain from harming self  Description: Interventions:  - Monitor patient closely, per order   - Supervise medication ingestion, monitor effects and side effects   Outcome: Progressing  Goal: Refrain from isolation  Description: Interventions:  - Develop a trusting relationship   - Encourage socialization   Outcome: Progressing  Goal: Refrain from self-neglect  Outcome: Progressing     Problem: Anxiety  Goal: Anxiety is at manageable level  Description: Interventions:  - Assess and monitor patient's anxiety level.   - Monitor for signs and symptoms (heart palpitations, chest pain, shortness of breath, headaches, nausea, feeling jumpy, restlessness, irritable, apprehensive).   - Collaborate with  interdisciplinary team and initiate plan and interventions as ordered.  - [place] patient to unit/surroundings  - Explain treatment plan  - Encourage participation in care  - Encourage verbalization of concerns/fears  - Identify coping mechanisms  - Assist in developing anxiety-reducing skills  - Administer/offer alternative therapies  - Limit or eliminate stimulants  Outcome: Progressing     Problem: Alteration in Orientation  Goal: Treatment Goal: Demonstrate a reduction of confusion and improved orientation to person, place, time and/or situation upon discharge, according to optimum baseline/ability  Outcome: Progressing  Goal: Interact with staff daily  Description: Interventions:  - Assess and re-assess patient's level of orientation  - Engage patient in 1 on 1 interactions, daily, for a minimum of 15 minutes   - Establish rapport/trust with patient   Outcome: Progressing  Goal: Allow medical examinations, as recommended  Description: Interventions:  - Provide physical/neurological exams and/or referrals, per provider   Outcome: Progressing  Goal: Cooperate with recommended testing/procedures  Description: Interventions:  - Determine need for ancillary testing  - Observe for mental status changes  - Implement falls/precaution protocol   Outcome: Progressing  Goal: Complete daily ADLs, including personal hygiene independently, as able  Description: Interventions:  - Observe, teach, and assist patient with ADLS  Outcome: Progressing     Problem: DISCHARGE PLANNING  Goal: Discharge to home with appropriate resources  Description: INTERVENTIONS:  - Identify barriers to discharge w/patient and caregiver  - Arrange for needed discharge resources and transportation as appropriate  - Identify discharge learning needs (meds, wound care, etc.)  - Refer to Case Management Department for coordinating discharge planning if the patient needs post-hospital services based on physician/advanced practitioner order or complex  needs related to functional status, cognitive ability, or social support system  Outcome: Progressing     Problem: Alteration in Thoughts and Perception  Goal: Treatment Goal: Gain control of psychotic behaviors/thinking, reduce/eliminate presenting symptoms and demonstrate improved reality functioning upon discharge  Outcome: Not Progressing  Goal: Refrain from acting on delusional thinking/internal stimuli  Description: Interventions:  - Monitor patient closely, per order   - Utilize least restrictive measures   - Set reasonable limits, give positive feedback for acceptable   - Administer medications as ordered and monitor of potential side effects  Outcome: Not Progressing  Goal: Recognize dysfunctional thoughts, communicate reality-based thoughts at the time of discharge  Description: Interventions:  - Provide medication and psycho-education to assist patient in compliance and developing insight into his/her illness   Outcome: Not Progressing
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
## 2025-02-25 NOTE — ASSESSMENT & PLAN NOTE
Continue psychotropic medications including:   Continue Clozapine 275 mg nightly to confer mood stability in addition to addressing psychosis. Continue upward titration.   02/18: , BNP 25, CRP 10, ANC 4.08  02/04:, BNP 10, CRP 9.5,ANC 4.42  CBC w/diff and CK every 2 weeks  Continue Cogentin 0.5 mg twice daily for EPS signs/symptoms. Upward titration as necessary and as tolerated.   Continue Haldol 5 mg twice daily to address psychosis   Continue Lexapro 20 mg daily to address depression and anxiety  Continue Propanolol 10 mg twice daily to address anxiety   Continue Melatonin 9 mg to address insomnia  Continue bowel regimen including Colace and Senna-Docusate sodium 50 mg tablet nightly for constipation    Patient pending ACT team linkage for discharge to his aunt's residence once MA funding comes through from the [facility].  No associated orders from this encounter found during lookback period of 72 hours.

## 2025-02-26 PROCEDURE — 99232 SBSQ HOSP IP/OBS MODERATE 35: CPT | Performed by: PSYCHIATRY & NEUROLOGY

## 2025-02-26 RX ADMIN — DOCUSATE SODIUM 100 MG: 100 CAPSULE, LIQUID FILLED ORAL at 08:48

## 2025-02-26 RX ADMIN — NICOTINE POLACRILEX 2 MG: 2 GUM, CHEWING ORAL at 17:17

## 2025-02-26 RX ADMIN — NICOTINE POLACRILEX 2 MG: 2 GUM, CHEWING ORAL at 21:10

## 2025-02-26 RX ADMIN — HALOPERIDOL 5 MG: 5 TABLET ORAL at 08:48

## 2025-02-26 RX ADMIN — PROPRANOLOL HYDROCHLORIDE 10 MG: 10 TABLET ORAL at 08:49

## 2025-02-26 RX ADMIN — CLOZAPINE 275 MG: 25 TABLET ORAL at 21:10

## 2025-02-26 RX ADMIN — BENZTROPINE MESYLATE 0.5 MG: 0.5 TABLET ORAL at 17:17

## 2025-02-26 RX ADMIN — PROPRANOLOL HYDROCHLORIDE 10 MG: 10 TABLET ORAL at 21:10

## 2025-02-26 RX ADMIN — LORATADINE 10 MG: 10 TABLET ORAL at 08:49

## 2025-02-26 RX ADMIN — ESCITALOPRAM OXALATE 20 MG: 10 TABLET, FILM COATED ORAL at 08:47

## 2025-02-26 RX ADMIN — BENZTROPINE MESYLATE 0.5 MG: 0.5 TABLET ORAL at 08:49

## 2025-02-26 RX ADMIN — PANTOPRAZOLE SODIUM 20 MG: 20 TABLET, DELAYED RELEASE ORAL at 08:49

## 2025-02-26 RX ADMIN — DOCUSATE SODIUM 100 MG: 100 CAPSULE, LIQUID FILLED ORAL at 17:17

## 2025-02-26 RX ADMIN — HYDROCHLOROTHIAZIDE 12.5 MG: 12.5 TABLET ORAL at 08:48

## 2025-02-26 RX ADMIN — HALOPERIDOL 5 MG: 5 TABLET ORAL at 17:17

## 2025-02-26 RX ADMIN — MULTIPLE VITAMINS W/ MINERALS TAB 1 TABLET: TAB ORAL at 08:48

## 2025-02-26 RX ADMIN — NICOTINE POLACRILEX 2 MG: 2 GUM, CHEWING ORAL at 12:40

## 2025-02-26 RX ADMIN — SENNOSIDES AND DOCUSATE SODIUM 2 TABLET: 50; 8.6 TABLET ORAL at 21:10

## 2025-02-26 RX ADMIN — MELATONIN TAB 3 MG 9 MG: 3 TAB at 21:10

## 2025-02-26 NOTE — NURSING NOTE
Patient is pleasant and cooperative. He is compliant with medications. Morning Norvasc was held due to not meeting parameters. He refused his miralax. He slept through breakfast. Ate 100 of lunch.

## 2025-02-26 NOTE — ASSESSMENT & PLAN NOTE
Continue psychotropic medications including:   Continue Clozapine 275 mg nightly to confer mood stability in addition to addressing psychosis. Continue upward titration.   02/18: , BNP 25, CRP 10, ANC 4.08  02/04:, BNP 10, CRP 9.5,ANC 4.42  CBC w/diff and CK every 2 weeks  Continue Cogentin 0.5 mg twice daily for EPS signs/symptoms. Upward titration as necessary and as tolerated.   Continue Haldol 5 mg twice daily to address psychosis   Continue Lexapro 20 mg daily to address depression and anxiety  Continue Propanolol 10 mg twice daily to address anxiety   Continue Melatonin 9 mg to address insomnia  Continue bowel regimen including Colace and Senna-Docusate sodium 50 mg tablet nightly for constipation    Patient pending ACT team linkage for discharge to his aunt's residence once MA funding comes through from the [facility].  No associated orders from this encounter found during lookback period of 72 hours.

## 2025-02-26 NOTE — PROGRESS NOTES
Progress Note - Behavioral Health   Name: Alberto Berumen 29 y.o. male I MRN: [id number]  Unit/Bed#: [facility] 112-02 I Date of Admission: 3/29/2024   Date of Service: 2/26/2025 I Hospital Day: 334     Assessment & Plan  Schizoaffective disorder, bipolar type (HCC)  Continue psychotropic medications including:   Continue Clozapine 275 mg nightly to confer mood stability in addition to addressing psychosis. Continue upward titration.   02/18: , BNP 25, CRP 10, ANC 4.08  02/04:, BNP 10, CRP 9.5,ANC 4.42  CBC w/diff and CK every 2 weeks  Continue Cogentin 0.5 mg twice daily for EPS signs/symptoms. Upward titration as necessary and as tolerated.   Continue Haldol 5 mg twice daily to address psychosis   Continue Lexapro 20 mg daily to address depression and anxiety  Continue Propanolol 10 mg twice daily to address anxiety   Continue Melatonin 9 mg to address insomnia  Continue bowel regimen including Colace and Senna-Docusate sodium 50 mg tablet nightly for constipation    Patient pending ACT team linkage for discharge to his aunt's residence once MA funding comes through from the [facility].  No associated orders from this encounter found during lookback period of 72 hours.    Chronic idiopathic constipation  See above  No associated orders from this encounter found during lookback period of 72 hours.  GERD (gastroesophageal reflux disease)  Continue medical management  No associated orders from this encounter found during lookback period of 72 hours.  Tobacco abuse  Continue supportive therapy including promotion of smoking cessation    No associated orders from this encounter found during lookback period of 72 hours.  Elevated hemoglobin A1c  Continue medical management  No associated orders from this encounter found during lookback period of 72 hours.  Class 2 obesity in adult  Continue medical management  No associated orders from this encounter found during lookback period of 72 hours.    Primary  "hypertension  Continue medical management  No associated orders from this encounter found during lookback period of 72 hours.    Plan:  Continue prescribed psychotropic medication regimen  Currently on 201 commitment status  Continue to promote patient participation in therapeutic milieu.  Continue medical management per medicine.  Continue behavioral health checks q.15 minutes.   Continue vitals per behavioral health unit protocol.  Safety: Safety and communication plan established to target dynamic risk factors discussed above.   Discharge and disposition: Plan to discharge to aunt's home following coordination with ACT services.     SUBJECTIVE     Patient evaluated this a.m. for continuity of care.Today, Alberto feels \"tired.\" Nursing reports he was interactive with peers, laughing, and cleaning up the dining room area last night. He denies increased feelings of sadness or anxiety at this time and states they are about the same as his baseline. When asked if his auditory hallucinations have changed in nature, he responded \" I wish.\" These auditory hallucinations are fixed and he remains hearing voices that threaten to kill him/family. Denies visual hallucinations or command hallucinations. Denies self-injurious thoughts/behaviors, suicidal ideation, or homicidal ideation at this time.     Alberto reports continued joint pain, most specifically in his left hand. He reports limited hand dexterity and strength when performing tasks. He believes this pain is due to his haldol. Patient educated on the use of PRN tylenol if needed for pain. Reports his body spasms have greatly improved since starting Cogentin. Remains medication compliant. Alberto also expressed having a lack of appetite last night as he did not eat dinner yesterday. Denies nausea, vomiting, diarrhea, or constipation at this time. States he just feels \"not hungry\" and requested Ensure to supplement. Otherwise sleeping well with minimal nighttime " disturbance, having bowel movements without difficulty, and continues to participate in group activities.    PSYCHIATRIC REVIEW OF SYSTEMS     Behavior over the last 24 hours:  unchanged  Sleep: normal  Appetite: normal  Medication side effects: Yes, muscle stiffness and weakness     Progress Toward Goals: Limited progress, as evidenced by their participation (or lack thereof) in individual, social and therapeutic milieu in addition to adherence to their medication regimen.  Patient Acceptance: Fair acceptance   Patient Understanding: Fair   Patient Involvement in Reintegration Process: Remains in contact with his sisters over the phone  Patient's Therapeutic Relationship with Psychiatrist: Trusting  Patient's Optimism Regarding Recovery: Continues to struggle with hearing voices, plan to discharge to aunt's home following coordination with ACT services.   Group Attendance: 9/12    REVIEW OF SYSTEMS   Review of systems: Left hand pain and weakness    OBJECTIVE     Vital Signs in Past 24 Hours:  Temp:  [94.4 °F (34.7 °C)-97.5 °F (36.4 °C)] 94.4 °F (34.7 °C)  HR:  [88-94] 88  BP: (115-134)/(67-75) 115/67  Resp:  [18] 18  SpO2:  [98 %-99 %] 99 %  O2 Device: None (Room air)    Intake/Output in Past 24 hours:  No intake/output data recorded.  No intake/output data recorded.        Laboratory Results:  I have personally reviewed all pertinent laboratory/tests results.  Most Recent Labs:   Lab Results   Component Value Date    WBC 7.82 02/18/2025    RBC 5.80 (H) 02/18/2025    HGB 13.4 02/18/2025    HCT 45.1 02/18/2025     02/18/2025    RDW 14.0 02/18/2025    NEUTROABS 4.08 02/18/2025    SODIUM 140 10/16/2024    K 3.8 10/16/2024     10/16/2024    CO2 29 10/16/2024    BUN 9 10/16/2024    CREATININE 0.91 10/16/2024    GLUC 94 10/16/2024    GLUF 94 10/16/2024    CALCIUM 9.5 10/16/2024    AST 26 09/30/2024    ALT 42 09/30/2024    ALKPHOS 64 09/30/2024    TP 6.6 09/30/2024    ALB 3.8 09/30/2024    TBILI 0.47  09/30/2024    CHOLESTEROL 156 03/14/2024    HDL 44 03/14/2024    TRIG 133 03/14/2024    LDLCALC 85 03/14/2024    NONHDLC 112 03/14/2024    LITHIUM 0.61 01/09/2024    [id number] 1.062 11/15/2023    HGBA1C 5.0 04/01/2024    EAG 97 04/01/2024       Behavioral Health Medications: all current active meds have been reviewed.    Current Facility-Administered Medications   Medication Dose Route Frequency Provider Last Rate    acetaminophen  650 mg Oral Q4H PRN Jordan C Holter, DO      acetaminophen  650 mg Oral Q6H PRN HOLLI Lion      aluminum-magnesium hydroxide-simethicone  30 mL Oral Q4H PRN Jordan C Holter, DO      amLODIPine  5 mg Oral Daily HOLLI Lion      Artificial Tears  1 drop Both Eyes Q3H PRN Jordan C Holter, DO      haloperidol lactate  2.5 mg Intramuscular Q4H PRN Max 4/day HOLLI Lion      And    LORazepam  1 mg Intramuscular Q4H PRN Max 4/day HOLLI Lion      And    benztropine  0.5 mg Intramuscular Q4H PRN Max 4/day HOLLI Lion      haloperidol lactate  5 mg Intramuscular Q4H PRN Max 4/day HOLLI Lion      And    LORazepam  2 mg Intramuscular Q4H PRN Max 4/day HOLLI Lion      And    benztropine  1 mg Intramuscular Q4H PRN Max 4/day HOLLI Lion      benztropine  0.5 mg Oral BID Jordan C Holter, DO      bisacodyl  10 mg Rectal Daily PRN HOLLI Lion      calcium carbonate  500 mg Oral BID PRN HOLLI Monge      cloZAPine  275 mg Oral HS Jordan C Holter,       hydrOXYzine HCL  50 mg Oral Q6H PRN Max 4/day Jordan C Holter, DO      Or    diphenhydrAMINE  50 mg Intramuscular Q6H PRN Jordan C Holter,       hydrOXYzine HCL  50 mg Oral Q6H PRN Max 4/day HOLLI Lion      Or    diphenhydrAMINE  50 mg Intramuscular Q6H PRN HOLLI Lion      diphenhydrAMINE-zinc acetate   Topical BID PRN HOLLI Lion      docusate sodium  100 mg Oral BID Jourdan Dickens,       escitalopram  20 mg Oral Daily HOLLI Lion       famotidine  20 mg Oral BID PRN HOLLI Monge      fluticasone  1 spray Each Nare Daily PRN HOLLI Monge      haloperidol  1 mg Oral Q6H PRN HOLLI Lion      haloperidol  2.5 mg Oral Q4H PRN Max 4/day HOLLI Lion      haloperidol  5 mg Oral Q4H PRN Max 4/day HOLLI Lion      haloperidol  5 mg Oral BID [facility] Holter, DO      hydroCHLOROthiazide  12.5 mg Oral Daily Pia Mantilla, HOLLI      hydrocortisone   Topical 4x Daily PRN HOLLI Lion      hydrOXYzine HCL  100 mg Oral Q6H PRN Max 4/day [facility] Holter, DO      Or    LORazepam  2 mg Intramuscular Q6H PRN [facility] Holter, DO      hydrOXYzine HCL  100 mg Oral Q6H PRN Max 4/day HOLLI Lion      Or    LORazepam  2 mg Intramuscular Q6H PRN HOLLI Lion      hydrOXYzine HCL  25 mg Oral Q6H PRN Max 4/day [facility] Holter, DO      ibuprofen  600 mg Oral Q8H PRN HOLLI Lion      influenza vaccine  0.5 mL Intramuscular Once Bora Rosario MD      loratadine  10 mg Oral Daily Brina Guillen MD      magnesium hydroxide  30 mL Oral Once Jourdan Dickens, DO      melatonin  3 mg Oral HS PRN Bora Rosario MD      melatonin  9 mg Oral HS Bora Rosario MD      methocarbamol  500 mg Oral Q6H PRN HOLLI Lion      multivitamin-minerals  1 tablet Oral Daily HOLLI Monge      nicotine polacrilex  2 mg Oral Q4H PRN Bora Rosario MD      OLANZapine  5 mg Oral Q4H PRN Max 3/day [facility] Holter, DO      Or    OLANZapine  2.5 mg Intramuscular Q4H PRN Max 3/day [facility] Holter, DO      OLANZapine  5 mg Oral Q3H PRN Max 3/day [facility] Holter, DO      Or    OLANZapine  5 mg Intramuscular Q3H PRN Max 3/day [facility] Holter, DO      OLANZapine  2.5 mg Oral Q4H PRN Max 6/day [facility] Holter, DO      ondansetron  4 mg Oral Q6H PRN HOLLI Lion      pantoprazole  20 mg Oral QAM Ion C Holter, DO      polyethylene glycol  17 g Oral Daily PRN HOLLI Ghotra      polyethylene glycol  17  "g Oral Daily Jourdan Dickens, DO      propranolol  10 mg Oral Q12H KAYLIE HOLLI Lion      saliva substitute  5 spray Mouth/Throat 4x Daily PRN Mary Jo Reich PA-C      senna-docusate sodium  1 tablet Oral Daily PRN Jordan C Holter,       senna-docusate sodium  2 tablet Oral HS Jourdan Dickens, DO      traZODone  50 mg Oral HS PRN HOLLI Lion      white petrolatum-mineral oil   Topical TID PRN HOLLI Lion         Risks, benefits and possible side effects of Medications:   Risks, benefits, and possible side effects of medications explained to patient and patient verbalizes understanding and agreement for treatment.    Dual Antipsychotic Rationale: Patient requires dual antipsychotic therapy due to multiple failed medication trials and inability to control symptoms on a single agent.      Mental Status Evaluation:  Appearance:  Alert,age appropriate, casually dressed, adequate grooming, in no acute distress    Behavior:  pleasant, cooperative, calm, limited eye contact   Speech:  normal rate and volume, clear, coherent, scant   Mood:  \"Tired\"   Affect:  Blunted, depressed   Thought Process:  organized, linear, normal rate of thoughts   Associations: concrete associations   Thought Content:  mild paranoia   Perceptual Disturbances: Auditory hallucinations of voices stating they will hurt him/family, no visual hallucinations, does not appear responding to internal stimuli   Risk Potential: Suicidal ideation - denies  Homicidal ideation - denies  Potential for aggression - Not at present   Sensorium:  oriented to person, place, and time/date   Memory:  recent and remote memory grossly intact   Consciousness:  alert and awake   Attention/Concentration: attention span and concentration appear shorter than expected for age   Insight:  partial   Judgment: limited   Gait/Station: in bed   Motor Activity: no abnormal movements, slow movements     Recommended Treatment:   See above for assessment and " plan.    Counseling/Coordination of Care    Total unit time spent today was greater than 15 minutes. Greater than 50% of total time was spent with the patient and/or patient's relatives and/or coordination of patient's care.     Patient's rights, confidentiality, exceptions to confidentiality, use of electronic medical record including appropriate staff access to medical record regarding behavioral health services and consent to treatment were reviewed.    Note Share:     This note was not shared with the patient due to reasonable likelihood of causing patient harm     Sharon Lilly, PA-S

## 2025-02-26 NOTE — NURSING NOTE
Received pt in bed at change of shift with eyes closed; chest movement noted.  Continues the same thus this far as per q 15 min room checks.   Will continue to monitor behavior, sleeping pattern and any medical issues that may arise.    0558:  Sleeping 7+ hrs thus this far

## 2025-02-26 NOTE — NURSING NOTE
Patient visible and cooperative all evening. Visible in the dining room with peers. Behaviors controlled and appropriate. Uneventful shift. No prn medication requested or required

## 2025-02-26 NOTE — PROGRESS NOTES
02/26/25 1049   Team Meeting   Meeting Type Daily Rounds   Team Members Present   Team Members Present Physician;Nurse;;Other (Discipline and Name)   Physician Team Member Holter & Jayne   Nursing Team Member Claudia   Social Work Team Member Jacob BROOKS   Other (Discipline and Name) ALMAS Wang & Jeremias   Patient/Family Present   Patient Present No   Patient's Family Present No     Group Participation: 9/12  Reports bones hurt due to Haldol CAMPUZANO. Poor appetite. Pleasant demeanor. Waiting for ACT services to discharge home.

## 2025-02-26 NOTE — PLAN OF CARE
Problem: Alteration in Thoughts and Perception  Goal: Treatment Goal: Gain control of psychotic behaviors/thinking, reduce/eliminate presenting symptoms and demonstrate improved reality functioning upon discharge  Outcome: Progressing  Goal: Refrain from acting on delusional thinking/internal stimuli  Description: Interventions:  - Monitor patient closely, per order   - Utilize least restrictive measures   - Set reasonable limits, give positive feedback for acceptable   - Administer medications as ordered and monitor of potential side effects  Outcome: Progressing  Goal: Agree to be compliant with medication regime, as prescribed and report medication side effects  Description: Interventions:  - Offer appropriate PRN medication and supervise ingestion; conduct AIMS, as needed   Outcome: Progressing  Goal: Recognize dysfunctional thoughts, communicate reality-based thoughts at the time of discharge  Description: Interventions:  - Provide medication and psycho-education to assist patient in compliance and developing insight into his/her illness   Outcome: Progressing     Problem: Ineffective Coping  Goal: Participates in unit activities  Description: Interventions:  - Provide therapeutic environment   - Provide required programming   - Redirect inappropriate behaviors   Outcome: Progressing  Goal: Patient/Family participate in treatment and DC plans  Description: Interventions:  - Provide therapeutic environment  Outcome: Progressing  Goal: Patient/Family verbalizes awareness of resources  Outcome: Progressing     Problem: Depression  Goal: Treatment Goal: Demonstrate behavioral control of depressive symptoms, verbalize feelings of improved mood/affect, and adopt new coping skills prior to discharge  Outcome: Progressing  Goal: Refrain from harming self  Description: Interventions:  - Monitor patient closely, per order   - Supervise medication ingestion, monitor effects and side effects   Outcome: Progressing  Goal:  Refrain from isolation  Description: Interventions:  - Develop a trusting relationship   - Encourage socialization   Outcome: Progressing  Goal: Refrain from self-neglect  Outcome: Progressing     Problem: Anxiety  Goal: Anxiety is at manageable level  Description: Interventions:  - Assess and monitor patient's anxiety level.   - Monitor for signs and symptoms (heart palpitations, chest pain, shortness of breath, headaches, nausea, feeling jumpy, restlessness, irritable, apprehensive).   - Collaborate with interdisciplinary team and initiate plan and interventions as ordered.  - [place] patient to unit/surroundings  - Explain treatment plan  - Encourage participation in care  - Encourage verbalization of concerns/fears  - Identify coping mechanisms  - Assist in developing anxiety-reducing skills  - Administer/offer alternative therapies  - Limit or eliminate stimulants  Outcome: Progressing     Problem: Alteration in Orientation  Goal: Treatment Goal: Demonstrate a reduction of confusion and improved orientation to person, place, time and/or situation upon discharge, according to optimum baseline/ability  Outcome: Progressing  Goal: Interact with staff daily  Description: Interventions:  - Assess and re-assess patient's level of orientation  - Engage patient in 1 on 1 interactions, daily, for a minimum of 15 minutes   - Establish rapport/trust with patient   Outcome: Progressing  Goal: Allow medical examinations, as recommended  Description: Interventions:  - Provide physical/neurological exams and/or referrals, per provider   Outcome: Progressing  Goal: Cooperate with recommended testing/procedures  Description: Interventions:  - Determine need for ancillary testing  - Observe for mental status changes  - Implement falls/precaution protocol   Outcome: Progressing  Goal: Complete daily ADLs, including personal hygiene independently, as able  Description: Interventions:  - Observe, teach, and assist patient with  ADLS  Outcome: Progressing     Problem: DISCHARGE PLANNING  Goal: Discharge to home with appropriate resources  Description: INTERVENTIONS:  - Identify barriers to discharge w/patient and caregiver  - Arrange for needed discharge resources and transportation as appropriate  - Identify discharge learning needs (meds, wound care, etc.)  - Refer to Case Management Department for coordinating discharge planning if the patient needs post-hospital services based on physician/advanced practitioner order or complex needs related to functional status, cognitive ability, or social support system  Outcome: Progressing

## 2025-02-27 PROCEDURE — 99232 SBSQ HOSP IP/OBS MODERATE 35: CPT | Performed by: PSYCHIATRY & NEUROLOGY

## 2025-02-27 RX ADMIN — PROPRANOLOL HYDROCHLORIDE 10 MG: 10 TABLET ORAL at 08:48

## 2025-02-27 RX ADMIN — DOCUSATE SODIUM 100 MG: 100 CAPSULE, LIQUID FILLED ORAL at 17:07

## 2025-02-27 RX ADMIN — HALOPERIDOL 5 MG: 5 TABLET ORAL at 17:07

## 2025-02-27 RX ADMIN — AMLODIPINE BESYLATE 5 MG: 5 TABLET ORAL at 08:47

## 2025-02-27 RX ADMIN — MULTIPLE VITAMINS W/ MINERALS TAB 1 TABLET: TAB ORAL at 08:47

## 2025-02-27 RX ADMIN — NICOTINE POLACRILEX 2 MG: 2 GUM, CHEWING ORAL at 17:07

## 2025-02-27 RX ADMIN — MELATONIN TAB 3 MG 9 MG: 3 TAB at 21:11

## 2025-02-27 RX ADMIN — DOCUSATE SODIUM 100 MG: 100 CAPSULE, LIQUID FILLED ORAL at 08:48

## 2025-02-27 RX ADMIN — BENZTROPINE MESYLATE 0.5 MG: 0.5 TABLET ORAL at 17:07

## 2025-02-27 RX ADMIN — PANTOPRAZOLE SODIUM 20 MG: 20 TABLET, DELAYED RELEASE ORAL at 08:48

## 2025-02-27 RX ADMIN — CLOZAPINE 275 MG: 25 TABLET ORAL at 21:11

## 2025-02-27 RX ADMIN — SENNOSIDES AND DOCUSATE SODIUM 2 TABLET: 50; 8.6 TABLET ORAL at 21:11

## 2025-02-27 RX ADMIN — BENZTROPINE MESYLATE 0.5 MG: 0.5 TABLET ORAL at 08:48

## 2025-02-27 RX ADMIN — NICOTINE POLACRILEX 2 MG: 2 GUM, CHEWING ORAL at 08:47

## 2025-02-27 RX ADMIN — HALOPERIDOL 5 MG: 5 TABLET ORAL at 08:48

## 2025-02-27 RX ADMIN — ESCITALOPRAM OXALATE 20 MG: 10 TABLET, FILM COATED ORAL at 08:48

## 2025-02-27 RX ADMIN — NICOTINE POLACRILEX 2 MG: 2 GUM, CHEWING ORAL at 12:47

## 2025-02-27 RX ADMIN — PROPRANOLOL HYDROCHLORIDE 10 MG: 10 TABLET ORAL at 21:11

## 2025-02-27 RX ADMIN — LORATADINE 10 MG: 10 TABLET ORAL at 08:48

## 2025-02-27 RX ADMIN — HYDROCHLOROTHIAZIDE 12.5 MG: 12.5 TABLET ORAL at 08:48

## 2025-02-27 RX ADMIN — NICOTINE POLACRILEX 2 MG: 2 GUM, CHEWING ORAL at 21:11

## 2025-02-27 NOTE — NURSING NOTE
Pt is visible on the unit and social with select peers. Took medications without incidence. Pt is pleasant and cooperative. Attended 7/10 groups. Reports threatening AH. Denies SI/HI/VH. No behavioral issues. Pt offers no new complaints. No complaints of pain this evening.

## 2025-02-27 NOTE — NURSING NOTE
Pt is visible on the unit and social with select peers. Consumed 50% of dinner, reports decreased appetite. Took medications without incidence. Pt is pleasant and cooperative. Attended 8/10 groups. Reports mild anxiety, depression, and threatening AHs. Utilizing the exercise room as a coping skill. Denies SI/HI/VH. No behavioral issues. Pt offers no complaints of pain this evening.

## 2025-02-27 NOTE — ASSESSMENT & PLAN NOTE
Continue psychotropic medications including:   Continue Clozapine 275 mg nightly to confer mood stability in addition to addressing psychosis. Continue upward titration.   02/18: , BNP 25, CRP 10, ANC 4.08  02/04:, BNP 10, CRP 9.5,ANC 4.42  CBC w/diff and CK every 2 weeks  Continue Cogentin 0.5 mg twice daily for EPS signs/symptoms. Upward titration as necessary and as tolerated.   Continue Haldol 5 mg twice daily to address psychosis   Continue Lexapro 20 mg daily to address depression and anxiety  Continue Propanolol 10 mg twice daily to address anxiety   Continue Melatonin 9 mg to address insomnia  Continue bowel regimen including Colace and Senna-Docusate sodium 50 mg tablet nightly for constipation    Patient pending ACT team linkage for discharge to his aunt's residence once MA funding comes through from the [facility].  No associated orders from this encounter found during lookback period of 72 hours.

## 2025-02-27 NOTE — NURSING NOTE
"Pt is accepting of medications without incidence and meal compliant. Reports anxiety, depression and AH, but denies all other s/s. States his \"joints hurt from the haldol\". Provider aware. Pt is polite, pleasant, laughs and jokes with staff. No new concerns otherwise.   "

## 2025-02-27 NOTE — PLAN OF CARE
Problem: Alteration in Thoughts and Perception  Goal: Treatment Goal: Gain control of psychotic behaviors/thinking, reduce/eliminate presenting symptoms and demonstrate improved reality functioning upon discharge  Outcome: Progressing  Goal: Refrain from acting on delusional thinking/internal stimuli  Description: Interventions:  - Monitor patient closely, per order   - Utilize least restrictive measures   - Set reasonable limits, give positive feedback for acceptable   - Administer medications as ordered and monitor of potential side effects  Outcome: Progressing  Goal: Agree to be compliant with medication regime, as prescribed and report medication side effects  Description: Interventions:  - Offer appropriate PRN medication and supervise ingestion; conduct AIMS, as needed   Outcome: Progressing     Problem: Ineffective Coping  Goal: Participates in unit activities  Description: Interventions:  - Provide therapeutic environment   - Provide required programming   - Redirect inappropriate behaviors   Outcome: Progressing  Goal: Patient/Family participate in treatment and DC plans  Description: Interventions:  - Provide therapeutic environment  Outcome: Progressing  Goal: Patient/Family verbalizes awareness of resources  Outcome: Progressing     Problem: Depression  Goal: Treatment Goal: Demonstrate behavioral control of depressive symptoms, verbalize feelings of improved mood/affect, and adopt new coping skills prior to discharge  Outcome: Progressing  Goal: Verbalize thoughts and feelings  Description: Interventions:  - Assess and re-assess patient's level of risk   - Engage patient in 1:1 interactions, daily, for a minimum of 15 minutes   - Encourage patient to express feelings, fears, frustrations, hopes   Outcome: Progressing  Goal: Refrain from harming self  Description: Interventions:  - Monitor patient closely, per order   - Supervise medication ingestion, monitor effects and side effects   Outcome:  Progressing  Goal: Refrain from isolation  Description: Interventions:  - Develop a trusting relationship   - Encourage socialization   Outcome: Progressing  Goal: Refrain from self-neglect  Outcome: Progressing     Problem: Anxiety  Goal: Anxiety is at manageable level  Description: Interventions:  - Assess and monitor patient's anxiety level.   - Monitor for signs and symptoms (heart palpitations, chest pain, shortness of breath, headaches, nausea, feeling jumpy, restlessness, irritable, apprehensive).   - Collaborate with interdisciplinary team and initiate plan and interventions as ordered.  - [place] patient to unit/surroundings  - Explain treatment plan  - Encourage participation in care  - Encourage verbalization of concerns/fears  - Identify coping mechanisms  - Assist in developing anxiety-reducing skills  - Administer/offer alternative therapies  - Limit or eliminate stimulants  Outcome: Progressing     Problem: Alteration in Orientation  Goal: Interact with staff daily  Description: Interventions:  - Assess and re-assess patient's level of orientation  - Engage patient in 1 on 1 interactions, daily, for a minimum of 15 minutes   - Establish rapport/trust with patient   Outcome: Progressing  Goal: Express concerns related to confused thinking related to:  Description: Interventions:  - Encourage patient to express feelings, fears, frustrations, hopes  - Assign consistent caregivers   - [place]/re-orient patient as needed  - Allow comfort items, as appropriate  - Provide visual cues, signs, etc.   Outcome: Progressing  Goal: Allow medical examinations, as recommended  Description: Interventions:  - Provide physical/neurological exams and/or referrals, per provider   Outcome: Progressing  Goal: Cooperate with recommended testing/procedures  Description: Interventions:  - Determine need for ancillary testing  - Observe for mental status changes  - Implement falls/precaution protocol   Outcome:  Progressing  Goal: Complete daily ADLs, including personal hygiene independently, as able  Description: Interventions:  - Observe, teach, and assist patient with ADLS  Outcome: Progressing     Problem: DISCHARGE PLANNING  Goal: Discharge to home with appropriate resources  Description: INTERVENTIONS:  - Identify barriers to discharge w/patient and caregiver  - Arrange for needed discharge resources and transportation as appropriate  - Identify discharge learning needs (meds, wound care, etc.)  - Refer to Case Management Department for coordinating discharge planning if the patient needs post-hospital services based on physician/advanced practitioner order or complex needs related to functional status, cognitive ability, or social support system  Outcome: Progressing

## 2025-02-27 NOTE — PROGRESS NOTES
Progress Note - Behavioral Health   Name: Alberto Berumen 29 y.o. male I MRN: [id number]  Unit/Bed#: [facility] 112-02 I Date of Admission: 3/29/2024   Date of Service: 2/27/2025 I Hospital Day: 335     Assessment & Plan  Schizoaffective disorder, bipolar type (HCC)  Continue psychotropic medications including:   Continue Clozapine 275 mg nightly to confer mood stability in addition to addressing psychosis. Continue upward titration.   02/18: , BNP 25, CRP 10, ANC 4.08  02/04:, BNP 10, CRP 9.5,ANC 4.42  CBC w/diff and CK every 2 weeks  Continue Cogentin 0.5 mg twice daily for EPS signs/symptoms. Upward titration as necessary and as tolerated.   Continue Haldol 5 mg twice daily to address psychosis   Continue Lexapro 20 mg daily to address depression and anxiety  Continue Propanolol 10 mg twice daily to address anxiety   Continue Melatonin 9 mg to address insomnia  Continue bowel regimen including Colace and Senna-Docusate sodium 50 mg tablet nightly for constipation    Patient pending ACT team linkage for discharge to his aunt's residence once MA funding comes through from the [facility].  No associated orders from this encounter found during lookback period of 72 hours.    Chronic idiopathic constipation  See above  No associated orders from this encounter found during lookback period of 72 hours.  GERD (gastroesophageal reflux disease)  Continue medical management  No associated orders from this encounter found during lookback period of 72 hours.  Tobacco abuse  Continue supportive therapy including promotion of smoking cessation    No associated orders from this encounter found during lookback period of 72 hours.  Elevated hemoglobin A1c  Continue medical management  No associated orders from this encounter found during lookback period of 72 hours.  Class 2 obesity in adult  Continue medical management  No associated orders from this encounter found during lookback period of 72 hours.    Primary  "hypertension  Continue medical management  No associated orders from this encounter found during lookback period of 72 hours.    Plan:  Continue prescribed psychotropic medication regimen  Currently on 201 commitment status  Continue to promote patient participation in therapeutic milieu.  Continue medical management per medicine.  Continue behavioral health checks q.15 minutes.   Continue vitals per behavioral health unit protocol.  Safety: Safety and communication plan established to target dynamic risk factors discussed above.   Discharge and disposition: Discharge to aunt's home following coordination with ACT services.     SUBJECTIVE     Patient evaluated this a.m. for continuity of care. Nursing reports no acute behaviors.Today, Alberto feels \"stable.\" Reports no increase in anxiety/depressive symptoms and feels he is at his baseline. States his auditory hallucinations of male/female voices threatening to kill him/family remain unchanged in nature. He is frustrated by constantly hearing the voices more than he believes them to be true. Improved insight. Denies command hallucinations or visual hallucinations. Denies self-injurious thoughts/behaviors, suicidal ideation, or homicidal ideation at this time.    Alberto states he woke up last night having full body pains/aches that resolved on its own and was able to fall back asleep. Continues to endorse worsening left thumb and hand pain/weakness which he believes these pains are from his haldol. Patient educated on the use of PRN pain medications which he denied using because he \"doesn't want to take too many medications.\" Patient has been observed to hit the punching bag hard by multiple staff in the past, but he denies injury to his left hand when asked. PT consult was made for his left hand.    Additionally, he did not eat dinner again last night, states he is just \"not hungry\" and denies nausea, vomiting, diarrhea, constipation, or fever. Denies difficulty " with bowel movements. Normally, Alberto does not eat breakfast but is now eating 1/3 meals a day. Otherwise is medication compliant. Body spasms have improved greatly on Cogentin. Maintains adequate hygiene and attends groups regularly.      PSYCHIATRIC REVIEW OF SYSTEMS     Behavior over the last 24 hours:  unchanged  Sleep: normal  Appetite: decreased  Medication side effects: Yes, body spasms but have improved on Cogentin.     Progress Toward Goals: Limited progress, as evidenced by their participation (or lack thereof) in individual, social and therapeutic milieu in addition to adherence to their medication regimen.  Patient Acceptance: Fair   Patient Understanding: Fair   Patient Involvement in Reintegration Process: Remains in contact with sisters and aunt over the phone. Engaged in group activities.   Patient's Therapeutic Relationship with Psychiatrist: Trusting   Patient's Optimism Regarding Recovery:  Discharge to aunt's home following coordination with ACT services.   Group Attendance: 7/10    REVIEW OF SYSTEMS   Review of systems: Body aches and left thumb pain.    OBJECTIVE     Vital Signs in Past 24 Hours:  Temp:  [97.8 °F (36.6 °C)-98 °F (36.7 °C)] 98 °F (36.7 °C)  HR:  [83-86] 83  BP: (127-133)/(61-76) 133/61  Resp:  [17-18] 17  SpO2:  [98 %-100 %] 98 %  O2 Device: None (Room air)    Intake/Output in Past 24 hours:  No intake/output data recorded.  No intake/output data recorded.        Laboratory Results:  I have personally reviewed all pertinent laboratory/tests results.  Most Recent Labs:   Lab Results   Component Value Date    WBC 7.82 02/18/2025    RBC 5.80 (H) 02/18/2025    HGB 13.4 02/18/2025    HCT 45.1 02/18/2025     02/18/2025    RDW 14.0 02/18/2025    NEUTROABS 4.08 02/18/2025    SODIUM 140 10/16/2024    K 3.8 10/16/2024     10/16/2024    CO2 29 10/16/2024    BUN 9 10/16/2024    CREATININE 0.91 10/16/2024    GLUC 94 10/16/2024    GLUF 94 10/16/2024    CALCIUM 9.5 10/16/2024     AST 26 09/30/2024    ALT 42 09/30/2024    ALKPHOS 64 09/30/2024    TP 6.6 09/30/2024    ALB 3.8 09/30/2024    TBILI 0.47 09/30/2024    CHOLESTEROL 156 03/14/2024    HDL 44 03/14/2024    TRIG 133 03/14/2024    LDLCALC 85 03/14/2024    NONHDLC 112 03/14/2024    LITHIUM 0.61 01/09/2024    IUL5XXJSSPBY 1.062 11/15/2023    HGBA1C 5.0 04/01/2024    EAG 97 04/01/2024       Behavioral Health Medications: all current active meds have been reviewed.    Current Facility-Administered Medications   Medication Dose Route Frequency Provider Last Rate    acetaminophen  650 mg Oral Q4H PRN Jordan C Holter, DO      acetaminophen  650 mg Oral Q6H PRN HOLLI Lion      aluminum-magnesium hydroxide-simethicone  30 mL Oral Q4H PRN Jordan C Holter,       amLODIPine  5 mg Oral Daily HOLLI Lion      Artificial Tears  1 drop Both Eyes Q3H PRN Jordan C Holter,       haloperidol lactate  2.5 mg Intramuscular Q4H PRN Max 4/day HOLLI Lion      And    LORazepam  1 mg Intramuscular Q4H PRN Max 4/day HOLLI Lion      And    benztropine  0.5 mg Intramuscular Q4H PRN Max 4/day HOLLI Lion      haloperidol lactate  5 mg Intramuscular Q4H PRN Max 4/day HOLLI Lion      And    LORazepam  2 mg Intramuscular Q4H PRN Max 4/day HOLLI Lion      And    benztropine  1 mg Intramuscular Q4H PRN Max 4/day HOLLI Lion      benztropine  0.5 mg Oral BID Jordan C Holter,       bisacodyl  10 mg Rectal Daily PRN HOLLI Lion      calcium carbonate  500 mg Oral BID PRN HOLLI Monge      cloZAPine  275 mg Oral HS Jordan C Holter,       hydrOXYzine HCL  50 mg Oral Q6H PRN Max 4/day Jordan C Holter,       Or    diphenhydrAMINE  50 mg Intramuscular Q6H PRN Jordan C Holter, DO      hydrOXYzine HCL  50 mg Oral Q6H PRN Max 4/day HOLLI Lion      Or    diphenhydrAMINE  50 mg Intramuscular Q6H PRN HOLLI Lion      diphenhydrAMINE-zinc acetate   Topical BID PRN Eveline Hunt,  HOLLI      docusate sodium  100 mg Oral BID Jourdan Dickens, DO      escitalopram  20 mg Oral Daily HOLLI Lion      famotidine  20 mg Oral BID PRN HOLLI Monge      fluticasone  1 spray Each Nare Daily PRN HOLLI Monge      haloperidol  1 mg Oral Q6H PRN HOLLI Lion      haloperidol  2.5 mg Oral Q4H PRN Max 4/day HOLLI Lion      haloperidol  5 mg Oral Q4H PRN Max 4/day HOLLI Lion      haloperidol  5 mg Oral BID Ion  Holter, DO      hydroCHLOROthiazide  12.5 mg Oral Daily Pia Mantilla, HOLLI      hydrocortisone   Topical 4x Daily PRN HOLLI Lion      hydrOXYzine HCL  100 mg Oral Q6H PRN Max 4/day [facility] Holter, DO      Or    LORazepam  2 mg Intramuscular Q6H PRN [facility] Holter, DO      hydrOXYzine HCL  100 mg Oral Q6H PRN Max 4/day HOLLI Lion      Or    LORazepam  2 mg Intramuscular Q6H PRN HOLLI Lion      hydrOXYzine HCL  25 mg Oral Q6H PRN Max 4/day [facility] Holter, DO      ibuprofen  600 mg Oral Q8H PRN HOLLI Lion      influenza vaccine  0.5 mL Intramuscular Once Bora Rosario MD      loratadine  10 mg Oral Daily Brina Guillen MD      magnesium hydroxide  30 mL Oral Once Jourdan Dickens, DO      melatonin  3 mg Oral HS PRN Bora Rosario MD      melatonin  9 mg Oral HS Bora Rosario MD      methocarbamol  500 mg Oral Q6H PRN HOLLI Lion      multivitamin-minerals  1 tablet Oral Daily HOLLI Monge      nicotine polacrilex  2 mg Oral Q4H PRN Bora Rosario MD      OLANZapine  5 mg Oral Q4H PRN Max 3/day Ion  Holter, DO      Or    OLANZapine  2.5 mg Intramuscular Q4H PRN Max 3/day [facility] Holter, DO      OLANZapine  5 mg Oral Q3H PRN Max 3/day Ion  Holter, DO      Or    OLANZapine  5 mg Intramuscular Q3H PRN Max 3/day Ion  Holter, DO      OLANZapine  2.5 mg Oral Q4H PRN Max 6/day [facility] Holter, DO      ondansetron  4 mg Oral Q6H PRN HOLLI Lion      pantoprazole  20  "mg Oral QAM Jordan C Holter, DO      polyethylene glycol  17 g Oral Daily PRN HOLLI Ghotra      polyethylene glycol  17 g Oral Daily Jourdan Dickens, DO      propranolol  10 mg Oral Q12H [facility] HLOLI Lion      saliva substitute  5 spray Mouth/Throat 4x Daily PRN Mary Jo Reich PA-C      senna-docusate sodium  1 tablet Oral Daily PRN Jordan C Holter, DO      senna-docusate sodium  2 tablet Oral HS Jourdan Dickens, DO      traZODone  50 mg Oral HS PRN HOLLI Lion      white petrolatum-mineral oil   Topical TID PRN HOLLI Lion         Risks, benefits and possible side effects of Medications:   Risks, benefits, and possible side effects of medications explained to patient and patient verbalizes understanding and agreement for treatment.    Dual Antipsychotic Rationale: Patient requires dual antipsychotic therapy due to multiple failed medication trials and inability to control symptoms on a single agent.    Mental Status Evaluation:  Appearance:  Alert, age appropriate, casually dressed, adequate grooming, in no acute distress   Behavior:  pleasant, cooperative, calm, limited eye contact   Speech:  normal rate and volume, fluent, clear, coherent   Mood:  \"stable\"   Affect:  blunted, depressed   Thought Process:  organized, linear, normal rate of thoughts   Associations: concrete associations   Thought Content:  mild paranoia   Perceptual Disturbances: Continued auditory hallucinations for voices threatening to kill him/family.no visual hallucinations, does not appear responding to internal stimuli   Risk Potential: Suicidal ideation - Denies  Homicidal ideation - Denies  Potential for aggression - Not at present    Sensorium:  oriented to person, place, and time/date   Memory:  recent and remote memory grossly intact   Consciousness:  alert and awake   Attention/Concentration: attention span and concentration appear shorter than expected for age   Insight:  partial   Judgment: limited "   Gait/Station: in bed   Motor Activity: no abnormal movements     Recommended Treatment:   See above for assessment and plan.    Counseling/Coordination of Care    Total unit time spent today was greater than 15 minutes. Greater than 50% of total time was spent with the patient and/or patient's relatives and/or coordination of patient's care.     Patient's rights, confidentiality, exceptions to confidentiality, use of electronic medical record including appropriate staff access to medical record regarding behavioral health services and consent to treatment were reviewed.    Note Share:     This note was not shared with the patient due to reasonable likelihood of causing patient harm     Sharon Lilly, PA-S

## 2025-02-27 NOTE — NURSING NOTE
Received pt in bed at change of shift with eyes closed; chest movement noted.  Continues the same thus this far as per q 15 min room checks.   Will continue to monitor behavior, sleeping pattern and any medical issues that may arise.    0606:  Sleeping 7+ hrs thus this far

## 2025-02-27 NOTE — SOCIAL WORK
This writer met with patient for an individual session. This writer provided telephone number for [facility], as patient requested to get a status update on his benefits and Rep Payee.   This writer emailed [facility] and ACT Teams requesting an update on his position for [facility].   Dianadarrickjaylen GORDON replied to this email   I am hoping by Mid-March or early April but I cannot give a definitive date yet since we need to successfully transition prior to his admission.  I will reach out once I have a definitive date.  [facility] Representative replied :  My understanding is that we are facing a deficit for all [facility] services. I have added Pinky to this chain in case you would like to discuss this situation further.   [facility] replied   At this time, I am still three clients over the maximum amount of [facility] funded individuals that I can have on my team's current caseload. Until they are established with outpatient services that can provide Medicare outpatient services, I will not be able to open a new [facility] funded client. At this time, we are actively working to secure outpatient for at least three, but that would still put me at the maximum number of clients that I can have that would receive [facility] funding until others are ready to discharge from our services.     I understand that this individual needs to get set up with services as soon as possible but at this time I am not able to provide a guaranteed timeframe, as I have been communicating with [facility] about this as well and cannot open people until we are back down to a number of clients that does not exceed the limit we are allotted. I will definitely provide updated information as soon as I can identify when we would be able to open up another [facility] individual. Thank you for your understanding.

## 2025-02-28 PROCEDURE — 97163 PT EVAL HIGH COMPLEX 45 MIN: CPT

## 2025-02-28 PROCEDURE — 99232 SBSQ HOSP IP/OBS MODERATE 35: CPT | Performed by: PSYCHIATRY & NEUROLOGY

## 2025-02-28 RX ADMIN — NICOTINE POLACRILEX 2 MG: 2 GUM, CHEWING ORAL at 09:00

## 2025-02-28 RX ADMIN — HYDROCHLOROTHIAZIDE 12.5 MG: 12.5 TABLET ORAL at 09:00

## 2025-02-28 RX ADMIN — NICOTINE POLACRILEX 2 MG: 2 GUM, CHEWING ORAL at 13:38

## 2025-02-28 RX ADMIN — MELATONIN TAB 3 MG 9 MG: 3 TAB at 21:21

## 2025-02-28 RX ADMIN — PROPRANOLOL HYDROCHLORIDE 10 MG: 10 TABLET ORAL at 09:00

## 2025-02-28 RX ADMIN — NICOTINE POLACRILEX 2 MG: 2 GUM, CHEWING ORAL at 21:30

## 2025-02-28 RX ADMIN — AMLODIPINE BESYLATE 5 MG: 5 TABLET ORAL at 09:00

## 2025-02-28 RX ADMIN — LORATADINE 10 MG: 10 TABLET ORAL at 09:00

## 2025-02-28 RX ADMIN — SENNOSIDES AND DOCUSATE SODIUM 2 TABLET: 50; 8.6 TABLET ORAL at 21:22

## 2025-02-28 RX ADMIN — ESCITALOPRAM OXALATE 20 MG: 10 TABLET, FILM COATED ORAL at 09:00

## 2025-02-28 RX ADMIN — PROPRANOLOL HYDROCHLORIDE 10 MG: 10 TABLET ORAL at 21:22

## 2025-02-28 RX ADMIN — DOCUSATE SODIUM 100 MG: 100 CAPSULE, LIQUID FILLED ORAL at 09:00

## 2025-02-28 RX ADMIN — HALOPERIDOL 5 MG: 5 TABLET ORAL at 09:00

## 2025-02-28 RX ADMIN — DOCUSATE SODIUM 100 MG: 100 CAPSULE, LIQUID FILLED ORAL at 17:30

## 2025-02-28 RX ADMIN — MULTIPLE VITAMINS W/ MINERALS TAB 1 TABLET: TAB ORAL at 09:00

## 2025-02-28 RX ADMIN — BENZTROPINE MESYLATE 0.5 MG: 0.5 TABLET ORAL at 09:00

## 2025-02-28 RX ADMIN — PANTOPRAZOLE SODIUM 20 MG: 20 TABLET, DELAYED RELEASE ORAL at 09:00

## 2025-02-28 RX ADMIN — HALOPERIDOL 5 MG: 5 TABLET ORAL at 17:30

## 2025-02-28 RX ADMIN — CLOZAPINE 275 MG: 25 TABLET ORAL at 21:22

## 2025-02-28 RX ADMIN — NICOTINE POLACRILEX 2 MG: 2 GUM, CHEWING ORAL at 17:30

## 2025-02-28 RX ADMIN — BENZTROPINE MESYLATE 0.5 MG: 0.5 TABLET ORAL at 17:30

## 2025-02-28 NOTE — PROGRESS NOTES
02/28/25 1113   Team Meeting   Meeting Type Daily Rounds   Team Members Present   Team Members Present Physician;Nurse;;Other (Discipline and Name)   Physician Team Member Holter   Nursing Team Member Claudia   Social Work Team Member Jacob BROOKS   Other (Discipline and Name) Jeremias   Patient/Family Present   Patient Present No   Patient's Family Present No     Group Participation: 8/10  PT consult for reported hand pain. Social with peers. Waiting for ACT services with plan to discharge home.

## 2025-02-28 NOTE — PROGRESS NOTES
Progress Note - Behavioral Health   Name: Alberto Berumen 29 y.o. male I MRN: [id number]  Unit/Bed#: [facility] 112-02 I Date of Admission: 3/29/2024   Date of Service: 2/28/2025 I Hospital Day: 336     Assessment & Plan  Schizoaffective disorder, bipolar type (HCC)  Continue psychotropic medications including:   Continue Clozapine 275 mg nightly to confer mood stability in addition to addressing psychosis. Continue upward titration.   02/18: , BNP 25, CRP 10, ANC 4.08  02/04:, BNP 10, CRP 9.5,ANC 4.42  CBC w/diff and CK every 2 weeks  Continue Cogentin 0.5 mg twice daily for EPS signs/symptoms. Upward titration as necessary and as tolerated.   Continue Haldol 5 mg twice daily to address psychosis   Continue Lexapro 20 mg daily to address depression and anxiety  Continue Propanolol 10 mg twice daily to address anxiety   Continue Melatonin 9 mg to address insomnia  Continue bowel regimen including Colace and Senna-Docusate sodium 50 mg tablet nightly for constipation    Patient pending ACT team linkage for discharge to his aunt's residence once MA funding comes through from the [facility].  No associated orders from this encounter found during lookback period of 72 hours.    Chronic idiopathic constipation  See above  No associated orders from this encounter found during lookback period of 72 hours.  GERD (gastroesophageal reflux disease)  Continue medical management  No associated orders from this encounter found during lookback period of 72 hours.  Tobacco abuse  Continue supportive therapy including promotion of smoking cessation    No associated orders from this encounter found during lookback period of 72 hours.  Elevated hemoglobin A1c  Continue medical management  No associated orders from this encounter found during lookback period of 72 hours.  Class 2 obesity in adult  Continue medical management  No associated orders from this encounter found during lookback period of 72 hours.    Primary  "hypertension  Continue medical management  No associated orders from this encounter found during lookback period of 72 hours.    Plan:  Continue prescribed psychotropic medication regimen  Currently on 201 commitment status  Continue to promote patient participation in therapeutic milieu.  Continue medical management per medicine.  Continue behavioral health checks q.15 minutes.   Continue vitals per behavioral health unit protocol.  Safety: Safety and communication plan established to target dynamic risk factors discussed above.   Discharge and disposition: Pending coordination of ACT services, plan to discharge home to aunt's house.    SUBJECTIVE     Patient evaluated this a.m. for continuity of care. Nursing reports no acute behaviors overnight. Today, Alberto states he feels the \"same.\" He denies depressives signs/symptoms or an increase in anxiety. He spoke with his sister on the phone this morning which he states brightens his mood. Continuous to endorse auditory hallucinations of voices threatening to kill him/family but states they are not \"so loud\" this morning. Denies any change in nature of the hallucinations, command hallucinations, and visual hallucinations when asked. Does not appear responding to internal stimuli during this interview. Denies self-injurious thoughts/behaviors, suicidal ideation, and homicidal ideation at this time.    Alberto states the pain he was experiencing in his left hand/thumb has improved since yesterday. Patient continues to hit the punching bag daily and was educated on safety measures when exercising. Denies injury to the left hand and states his body aches have also improved. PT eval in place. Patient requests Ensure to supplement in his diet as he has been \"just not hungry\" when its dinner time which he normally eats. Ate 0% of breakfast which is his baseline, 100% lunch, and 50% dinner last night. Denies nausea, vomiting, fever, diarrhea, or constipation at this time. " Reports normal bowel movements without difficulty, sleeping well, and adequate hygiene.       PSYCHIATRIC REVIEW OF SYSTEMS     Behavior over the last 24 hours:  unchanged  Sleep: normal  Appetite: decreased  Medication side effects: No    Progress Toward Goals: Limited progress, as evidenced by their participation (or lack thereof) in individual, social and therapeutic milieu in addition to adherence to their medication regimen.  Patient Acceptance: Fair  Patient Understanding: Partial  Patient Involvement in Reintegration Process: Remains in contact with sisters and aunt daily.  Patient's Therapeutic Relationship with Psychiatrist: Trusting  Patient's Optimism Regarding Recovery: Continues to struggle with auditory hallucinations. Pending coordination of ACT services, plan to discharge home to aunt's house.  Group Attendance: 8/10    REVIEW OF SYSTEMS   Review of systems: Left thumb/hand pain    OBJECTIVE     Vital Signs in Past 24 Hours:  Temp:  [97.7 °F (36.5 °C)-98.1 °F (36.7 °C)] 97.7 °F (36.5 °C)  HR:  [72-97] 72  BP: (123-137)/(68-80) 127/80  Resp:  [18] 18  SpO2:  [98 %-100 %] 98 %  O2 Device: None (Room air)    Intake/Output in Past 24 hours:  No intake/output data recorded.  No intake/output data recorded.        Laboratory Results:  I have personally reviewed all pertinent laboratory/tests results.  Most Recent Labs:   Lab Results   Component Value Date    WBC 7.82 02/18/2025    RBC 5.80 (H) 02/18/2025    HGB 13.4 02/18/2025    HCT 45.1 02/18/2025     02/18/2025    RDW 14.0 02/18/2025    NEUTROABS 4.08 02/18/2025    SODIUM 140 10/16/2024    K 3.8 10/16/2024     10/16/2024    CO2 29 10/16/2024    BUN 9 10/16/2024    CREATININE 0.91 10/16/2024    GLUC 94 10/16/2024    GLUF 94 10/16/2024    CALCIUM 9.5 10/16/2024    AST 26 09/30/2024    ALT 42 09/30/2024    ALKPHOS 64 09/30/2024    TP 6.6 09/30/2024    ALB 3.8 09/30/2024    TBILI 0.47 09/30/2024    CHOLESTEROL 156 03/14/2024    HDL 44  03/14/2024    TRIG 133 03/14/2024    LDLCALC 85 03/14/2024    NONHDLC 112 03/14/2024    LITHIUM 0.61 01/09/2024    [id number] 1.062 11/15/2023    HGBA1C 5.0 04/01/2024    EAG 97 04/01/2024       Behavioral Health Medications: all current active meds have been reviewed.    Current Facility-Administered Medications   Medication Dose Route Frequency Provider Last Rate    acetaminophen  650 mg Oral Q4H PRN Jordan C Holter, DO      acetaminophen  650 mg Oral Q6H PRN HOLLI Lion      aluminum-magnesium hydroxide-simethicone  30 mL Oral Q4H PRN Jordan C Holter, DO      amLODIPine  5 mg Oral Daily HOLLI Lion      Artificial Tears  1 drop Both Eyes Q3H PRN Jordan C Holter, DO      haloperidol lactate  2.5 mg Intramuscular Q4H PRN Max 4/day HOLLI Lion      And    LORazepam  1 mg Intramuscular Q4H PRN Max 4/day HOLLI Lion      And    benztropine  0.5 mg Intramuscular Q4H PRN Max 4/day HOLLI Lion      haloperidol lactate  5 mg Intramuscular Q4H PRN Max 4/day HOLLI Lion      And    LORazepam  2 mg Intramuscular Q4H PRN Max 4/day HOLLI Lion      And    benztropine  1 mg Intramuscular Q4H PRN Max 4/day HOLLI Lion      benztropine  0.5 mg Oral BID Jordan C Holter, DO      bisacodyl  10 mg Rectal Daily PRN HOLLI Lion      calcium carbonate  500 mg Oral BID PRN HOLLI Monge      cloZAPine  275 mg Oral HS Jordan C Holter,       hydrOXYzine HCL  50 mg Oral Q6H PRN Max 4/day Jordan C Holter, DO      Or    diphenhydrAMINE  50 mg Intramuscular Q6H PRN Jordan C Holter,       hydrOXYzine HCL  50 mg Oral Q6H PRN Max 4/day HOLLI Lion      Or    diphenhydrAMINE  50 mg Intramuscular Q6H PRN HOLLI Lion      diphenhydrAMINE-zinc acetate   Topical BID PRN HOLLI Lion      docusate sodium  100 mg Oral BID Jourdan Dickens, DO      escitalopram  20 mg Oral Daily HOLLI Lion      famotidine  20 mg Oral BID PRN Eileen Holliday  HOLLI Jensen      fluticasone  1 spray Each Nare Daily PRN EileenHOLLI Cummins      haloperidol  1 mg Oral Q6H PRN HOLLI Lion      haloperidol  2.5 mg Oral Q4H PRN Max 4/day HOLLI Lion      haloperidol  5 mg Oral Q4H PRN Max 4/day HOLLI Lion      haloperidol  5 mg Oral BID [facility] Holter, DO      hydroCHLOROthiazide  12.5 mg Oral Daily Pia Mantilla, HOLLI      hydrocortisone   Topical 4x Daily PRN HOLLI Lion      hydrOXYzine HCL  100 mg Oral Q6H PRN Max 4/day [facility] Holter, DO      Or    LORazepam  2 mg Intramuscular Q6H PRN [facility] Holter, DO      hydrOXYzine HCL  100 mg Oral Q6H PRN Max 4/day HOLLI Lion      Or    LORazepam  2 mg Intramuscular Q6H PRN HOLLI Lion      hydrOXYzine HCL  25 mg Oral Q6H PRN Max 4/day [facility] Holter, DO      ibuprofen  600 mg Oral Q8H PRN HOLLI Lion      influenza vaccine  0.5 mL Intramuscular Once Bora Rosario MD      loratadine  10 mg Oral Daily Brina Guillen MD      magnesium hydroxide  30 mL Oral Once Jourdan Dickens, DO      melatonin  3 mg Oral HS PRN Bora Rosario MD      melatonin  9 mg Oral HS Bora Rosario MD      methocarbamol  500 mg Oral Q6H PRN HOLLI Lion      multivitamin-minerals  1 tablet Oral Daily HOLLI Monge      nicotine polacrilex  2 mg Oral Q4H PRN Bora Rosario MD      OLANZapine  5 mg Oral Q4H PRN Max 3/day [facility] Holter, DO      Or    OLANZapine  2.5 mg Intramuscular Q4H PRN Max 3/day [facility] Holter, DO      OLANZapine  5 mg Oral Q3H PRN Max 3/day [facility] Holter, DO      Or    OLANZapine  5 mg Intramuscular Q3H PRN Max 3/day [facility] Holter, DO      OLANZapine  2.5 mg Oral Q4H PRN Max 6/day [facility] Holter, DO      ondansetron  4 mg Oral Q6H PRN HOLLI Lion      [facility]opraz[facility]      polyethylene glycol  17 g Oral Daily PRN HOLLI Ghotra      polyethylene glycol  17 g Oral Daily Jourdan Dickens, DO       "propranolol  10 mg Oral Q12H [facility] HOLLI Lion      saliva substitute  5 spray Mouth/Throat 4x Daily PRN Mary Jo Reich PA-C      senna-docusate sodium  1 tablet Oral Daily PRN Jordan C Holter, DO      senna-docusate sodium  2 tablet Oral HS Jourdan Dickens DO      traZODone  50 mg Oral HS PRN HOLLI Lion      white petrolatum-mineral oil   Topical TID PRN HOLLI Lion         Risks, benefits and possible side effects of Medications:   Risks, benefits, and possible side effects of medications explained to patient and patient verbalizes understanding and agreement for treatment.    Dual Antipsychotic Rationale: Patient requires dual antipsychotic therapy at this time due to multiple failed medications trials and inability to control symptoms on a single agent.    Mental Status Evaluation:  Appearance:  age appropriate, casually dressed, adequate grooming, in no acute distress   Behavior:  pleasant, cooperative, calm   Speech:  normal rate and volume, fluent, clear, coherent   Mood:  \"Same\"   Affect:  blunted, depressed   Thought Process:  organized, linear, normal rate of thoughts   Associations: concrete associations   Thought Content:  mild paranoia   Perceptual Disturbances: Auditory hallucinations of voices stating they will kill him/family, unchanged from the last 3 years. no visual hallucinations, does not appear responding to internal stimuli   Risk Potential: Suicidal ideation - Denies  Homicidal ideation - Denies  Potential for aggression - Not at present   Sensorium:  oriented to person, place, and time/date   Memory:  recent and remote memory grossly intact   Consciousness:  alert and awake   Attention/Concentration: attention span and concentration appear shorter than expected for age   Insight:  partial   Judgment: limited   Gait/Station: in bed   Motor Activity: no abnormal movements     Recommended Treatment:   See above for assessment and plan.    Counseling/Coordination of " Care    Total unit time spent today was greater than 15 minutes. Greater than 50% of total time was spent with the patient and/or patient's relatives and/or coordination of patient's care.     Patient's rights, confidentiality, exceptions to confidentiality, use of electronic medical record including appropriate staff access to medical record regarding behavioral health services and consent to treatment were reviewed.    Note Share:     This note was not shared with the patient due to reasonable likelihood of causing patient harm     Sharon Lilly, PA-S

## 2025-02-28 NOTE — PLAN OF CARE
Problem: Alteration in Thoughts and Perception  Goal: Treatment Goal: Gain control of psychotic behaviors/thinking, reduce/eliminate presenting symptoms and demonstrate improved reality functioning upon discharge  Outcome: Progressing  Goal: Refrain from acting on delusional thinking/internal stimuli  Description: Interventions:  - Monitor patient closely, per order   - Utilize least restrictive measures   - Set reasonable limits, give positive feedback for acceptable   - Administer medications as ordered and monitor of potential side effects  Outcome: Progressing  Goal: Agree to be compliant with medication regime, as prescribed and report medication side effects  Description: Interventions:  - Offer appropriate PRN medication and supervise ingestion; conduct AIMS, as needed   Outcome: Progressing  Goal: Recognize dysfunctional thoughts, communicate reality-based thoughts at the time of discharge  Description: Interventions:  - Provide medication and psycho-education to assist patient in compliance and developing insight into his/her illness   Outcome: Progressing     Problem: Ineffective Coping  Goal: Participates in unit activities  Description: Interventions:  - Provide therapeutic environment   - Provide required programming   - Redirect inappropriate behaviors   Outcome: Progressing  Goal: Patient/Family participate in treatment and DC plans  Description: Interventions:  - Provide therapeutic environment  Outcome: Progressing  Goal: Patient/Family verbalizes awareness of resources  Outcome: Progressing     Problem: Depression  Goal: Treatment Goal: Demonstrate behavioral control of depressive symptoms, verbalize feelings of improved mood/affect, and adopt new coping skills prior to discharge  Outcome: Progressing  Goal: Refrain from harming self  Description: Interventions:  - Monitor patient closely, per order   - Supervise medication ingestion, monitor effects and side effects   Outcome: Progressing  Goal:  Refrain from isolation  Description: Interventions:  - Develop a trusting relationship   - Encourage socialization   Outcome: Progressing  Goal: Refrain from self-neglect  Outcome: Progressing     Problem: Anxiety  Goal: Anxiety is at manageable level  Description: Interventions:  - Assess and monitor patient's anxiety level.   - Monitor for signs and symptoms (heart palpitations, chest pain, shortness of breath, headaches, nausea, feeling jumpy, restlessness, irritable, apprehensive).   - Collaborate with interdisciplinary team and initiate plan and interventions as ordered.  - [place] patient to unit/surroundings  - Explain treatment plan  - Encourage participation in care  - Encourage verbalization of concerns/fears  - Identify coping mechanisms  - Assist in developing anxiety-reducing skills  - Administer/offer alternative therapies  - Limit or eliminate stimulants  Outcome: Progressing     Problem: Alteration in Orientation  Goal: Treatment Goal: Demonstrate a reduction of confusion and improved orientation to person, place, time and/or situation upon discharge, according to optimum baseline/ability  Outcome: Progressing  Goal: Interact with staff daily  Description: Interventions:  - Assess and re-assess patient's level of orientation  - Engage patient in 1 on 1 interactions, daily, for a minimum of 15 minutes   - Establish rapport/trust with patient   Outcome: Progressing  Goal: Allow medical examinations, as recommended  Description: Interventions:  - Provide physical/neurological exams and/or referrals, per provider   Outcome: Progressing  Goal: Cooperate with recommended testing/procedures  Description: Interventions:  - Determine need for ancillary testing  - Observe for mental status changes  - Implement falls/precaution protocol   Outcome: Progressing  Goal: Complete daily ADLs, including personal hygiene independently, as able  Description: Interventions:  - Observe, teach, and assist patient with  ADLS  Outcome: Progressing     Problem: DISCHARGE PLANNING  Goal: Discharge to home with appropriate resources  Description: INTERVENTIONS:  - Identify barriers to discharge w/patient and caregiver  - Arrange for needed discharge resources and transportation as appropriate  - Identify discharge learning needs (meds, wound care, etc.)  - Refer to Case Management Department for coordinating discharge planning if the patient needs post-hospital services based on physician/advanced practitioner order or complex needs related to functional status, cognitive ability, or social support system  Outcome: Progressing

## 2025-02-28 NOTE — ASSESSMENT & PLAN NOTE
Continue psychotropic medications including:   Continue Clozapine 275 mg nightly to confer mood stability in addition to addressing psychosis. Continue upward titration.   02/18: , BNP 25, CRP 10, ANC 4.08  02/04:, BNP 10, CRP 9.5,ANC 4.42  CBC w/diff and CK every 2 weeks  Continue Cogentin 0.5 mg twice daily for EPS signs/symptoms. Upward titration as necessary and as tolerated.   Continue Haldol 5 mg twice daily to address psychosis   Continue Lexapro 20 mg daily to address depression and anxiety  Continue Propanolol 10 mg twice daily to address anxiety   Continue Melatonin 9 mg to address insomnia  Continue bowel regimen including Colace and Senna-Docusate sodium 50 mg tablet nightly for constipation    Patient pending ACT team linkage for discharge to his aunt's residence once MA funding comes through from the [facility].  No associated orders from this encounter found during lookback period of 72 hours.

## 2025-02-28 NOTE — NURSING NOTE
"Pt is accepting of medications without incidence and meal compliant. Pt has been visible in the milieu all shift. Pt stayed out of bed, attending groups and social with select peers in dining room. Pt is polite, pleasant and brightens on approach. Pt reports anxiety, depression and the \"same\" AH. Pt verbalized that he spoke to his sister this morning on the phone. When asked how that went he stated \"that was fine, I still need to get a hold of my other sister, but no luck yet\" with his head down rubbing his face and shaking his head. Pt otherwise offers no new concerns at this time.   "

## 2025-03-01 PROCEDURE — 99232 SBSQ HOSP IP/OBS MODERATE 35: CPT | Performed by: NURSE PRACTITIONER

## 2025-03-01 RX ADMIN — NICOTINE POLACRILEX 2 MG: 2 GUM, CHEWING ORAL at 17:20

## 2025-03-01 RX ADMIN — BENZTROPINE MESYLATE 0.5 MG: 0.5 TABLET ORAL at 17:20

## 2025-03-01 RX ADMIN — SENNOSIDES AND DOCUSATE SODIUM 2 TABLET: 50; 8.6 TABLET ORAL at 21:14

## 2025-03-01 RX ADMIN — MELATONIN TAB 3 MG 9 MG: 3 TAB at 21:13

## 2025-03-01 RX ADMIN — DOCUSATE SODIUM 100 MG: 100 CAPSULE, LIQUID FILLED ORAL at 08:54

## 2025-03-01 RX ADMIN — AMLODIPINE BESYLATE 5 MG: 5 TABLET ORAL at 08:53

## 2025-03-01 RX ADMIN — CLOZAPINE 275 MG: 25 TABLET ORAL at 21:14

## 2025-03-01 RX ADMIN — LORATADINE 10 MG: 10 TABLET ORAL at 08:54

## 2025-03-01 RX ADMIN — DOCUSATE SODIUM 100 MG: 100 CAPSULE, LIQUID FILLED ORAL at 17:20

## 2025-03-01 RX ADMIN — MULTIPLE VITAMINS W/ MINERALS TAB 1 TABLET: TAB ORAL at 08:53

## 2025-03-01 RX ADMIN — HALOPERIDOL 5 MG: 5 TABLET ORAL at 17:20

## 2025-03-01 RX ADMIN — NICOTINE POLACRILEX 2 MG: 2 GUM, CHEWING ORAL at 13:05

## 2025-03-01 RX ADMIN — HALOPERIDOL 5 MG: 5 TABLET ORAL at 08:54

## 2025-03-01 RX ADMIN — BENZTROPINE MESYLATE 0.5 MG: 0.5 TABLET ORAL at 08:53

## 2025-03-01 RX ADMIN — PROPRANOLOL HYDROCHLORIDE 10 MG: 10 TABLET ORAL at 08:53

## 2025-03-01 RX ADMIN — NICOTINE POLACRILEX 2 MG: 2 GUM, CHEWING ORAL at 08:54

## 2025-03-01 RX ADMIN — ESCITALOPRAM OXALATE 20 MG: 10 TABLET, FILM COATED ORAL at 08:53

## 2025-03-01 RX ADMIN — NICOTINE POLACRILEX 2 MG: 2 GUM, CHEWING ORAL at 21:14

## 2025-03-01 RX ADMIN — HYDROCHLOROTHIAZIDE 12.5 MG: 12.5 TABLET ORAL at 08:53

## 2025-03-01 RX ADMIN — PROPRANOLOL HYDROCHLORIDE 10 MG: 10 TABLET ORAL at 21:14

## 2025-03-01 RX ADMIN — PANTOPRAZOLE SODIUM 20 MG: 20 TABLET, DELAYED RELEASE ORAL at 08:53

## 2025-03-01 NOTE — NURSING NOTE
Alberto was visible at the beginning of the shift for VS and menu. He was in the dining room during Community Meeting. He went back to his room prior to breakfast arriving. He did not eat breakfast, but ate 100% of lunch. Naps at intervals. Took medication without difficulty. Nicotine gum given at 0854 and 1305. Participated in Coping Skills group and Nursing Education. No issues or behaviors. Continue to monitor. Precautions maintained.

## 2025-03-01 NOTE — ASSESSMENT & PLAN NOTE
Continue psychotropic medications including:   Continue Clozapine 275 mg nightly to confer mood stability in addition to addressing psychosis. Continue upward titration.   02/18: , BNP 25, CRP 10, ANC 4.08  02/04:, BNP 10, CRP 9.5,ANC 4.42  CBC w/diff and CK every 2 weeks  Continue Cogentin 0.5 mg twice daily for EPS signs/symptoms. Upward titration as necessary and as tolerated.   Continue Haldol 5 mg twice daily to address psychosis   Continue Lexapro 20 mg daily to address depression and anxiety  Continue Propanolol 10 mg twice daily to address anxiety   Continue Melatonin 9 mg to address insomnia  Continue bowel regimen including Colace and Senna-Docusate sodium 50 mg tablet nightly for constipation    Patient pending ACT team linkage for discharge to his aunt's residence once MA funding comes through from the [facility].  No associated orders from this encounter found during lookback period of 72 hours.

## 2025-03-01 NOTE — ASSESSMENT & PLAN NOTE
Continue medical management  Orders from past 72 hours:    Inpatient Consult to Nutrition Services; Standing

## 2025-03-01 NOTE — NURSING NOTE
Pt reports depressed today.  The voices are still telling him that they will kill him and his family.  Pt has been visible the entire shift.  Pacing the unit with a certain peer.  Pt was seen by PT today for hand pain.  Pt utilized the punching bag today after the PT visit.  Pt scheduled a virtual visit with his sister for tomorrow at 1400.  Per patient, his CM told him that he could have the visit once he receives a time from his sister.  Pt plans to give the email address to one of the nurses tomorrow morning so that they can set up the visit on the tablet.  Med and meal compliant.  No unmet needs.

## 2025-03-01 NOTE — PLAN OF CARE
Problem: Alteration in Thoughts and Perception  Goal: Treatment Goal: Gain control of psychotic behaviors/thinking, reduce/eliminate presenting symptoms and demonstrate improved reality functioning upon discharge  Outcome: Progressing  Goal: Refrain from acting on delusional thinking/internal stimuli  Description: Interventions:  - Monitor patient closely, per order   - Utilize least restrictive measures   - Set reasonable limits, give positive feedback for acceptable   - Administer medications as ordered and monitor of potential side effects  Outcome: Progressing  Goal: Agree to be compliant with medication regime, as prescribed and report medication side effects  Description: Interventions:  - Offer appropriate PRN medication and supervise ingestion; conduct AIMS, as needed   Outcome: Progressing  Goal: Recognize dysfunctional thoughts, communicate reality-based thoughts at the time of discharge  Description: Interventions:  - Provide medication and psycho-education to assist patient in compliance and developing insight into his/her illness   Outcome: Progressing     Problem: Ineffective Coping  Goal: Participates in unit activities  Description: Interventions:  - Provide therapeutic environment   - Provide required programming   - Redirect inappropriate behaviors   Outcome: Progressing  Goal: Patient/Family participate in treatment and DC plans  Description: Interventions:  - Provide therapeutic environment  Outcome: Progressing  Goal: Patient/Family verbalizes awareness of resources  Outcome: Progressing     Problem: Depression  Goal: Treatment Goal: Demonstrate behavioral control of depressive symptoms, verbalize feelings of improved mood/affect, and adopt new coping skills prior to discharge  Outcome: Progressing  Goal: Verbalize thoughts and feelings  Description: Interventions:  - Assess and re-assess patient's level of risk   - Engage patient in 1:1 interactions, daily, for a minimum of 15 minutes   -  Encourage patient to express feelings, fears, frustrations, hopes   Outcome: Progressing  Goal: Refrain from harming self  Description: Interventions:  - Monitor patient closely, per order   - Supervise medication ingestion, monitor effects and side effects   Outcome: Progressing  Goal: Refrain from isolation  Description: Interventions:  - Develop a trusting relationship   - Encourage socialization   Outcome: Progressing  Goal: Refrain from self-neglect  Outcome: Progressing     Problem: Anxiety  Goal: Anxiety is at manageable level  Description: Interventions:  - Assess and monitor patient's anxiety level.   - Monitor for signs and symptoms (heart palpitations, chest pain, shortness of breath, headaches, nausea, feeling jumpy, restlessness, irritable, apprehensive).   - Collaborate with interdisciplinary team and initiate plan and interventions as ordered.  - [place] patient to unit/surroundings  - Explain treatment plan  - Encourage participation in care  - Encourage verbalization of concerns/fears  - Identify coping mechanisms  - Assist in developing anxiety-reducing skills  - Administer/offer alternative therapies  - Limit or eliminate stimulants  Outcome: Progressing     Problem: Alteration in Orientation  Goal: Treatment Goal: Demonstrate a reduction of confusion and improved orientation to person, place, time and/or situation upon discharge, according to optimum baseline/ability  Outcome: Progressing  Goal: Interact with staff daily  Description: Interventions:  - Assess and re-assess patient's level of orientation  - Engage patient in 1 on 1 interactions, daily, for a minimum of 15 minutes   - Establish rapport/trust with patient   Outcome: Progressing  Goal: Allow medical examinations, as recommended  Description: Interventions:  - Provide physical/neurological exams and/or referrals, per provider   Outcome: Progressing  Goal: Cooperate with recommended testing/procedures  Description: Interventions:  -  Determine need for ancillary testing  - Observe for mental status changes  - Implement falls/precaution protocol   Outcome: Progressing  Goal: Complete daily ADLs, including personal hygiene independently, as able  Description: Interventions:  - Observe, teach, and assist patient with ADLS  Outcome: Progressing     Problem: DISCHARGE PLANNING  Goal: Discharge to home with appropriate resources  Description: INTERVENTIONS:  - Identify barriers to discharge w/patient and caregiver  - Arrange for needed discharge resources and transportation as appropriate  - Identify discharge learning needs (meds, wound care, etc.)  - Refer to Case Management Department for coordinating discharge planning if the patient needs post-hospital services based on physician/advanced practitioner order or complex needs related to functional status, cognitive ability, or social support system  Outcome: Progressing

## 2025-03-01 NOTE — PROGRESS NOTES
Progress Note - Behavioral Health   Name: Alberto Berumen 29 y.o. male I MRN: [id number]  Unit/Bed#: [facility] 112-02 I Date of Admission: 3/29/2024   Date of Service: 3/1/2025 I Hospital Day: 337     Assessment & Plan  Schizoaffective disorder, bipolar type (HCC)  Continue psychotropic medications including:   Continue Clozapine 275 mg nightly to confer mood stability in addition to addressing psychosis. Continue upward titration.   02/18: , BNP 25, CRP 10, ANC 4.08  02/04:, BNP 10, CRP 9.5,ANC 4.42  CBC w/diff and CK every 2 weeks  Continue Cogentin 0.5 mg twice daily for EPS signs/symptoms. Upward titration as necessary and as tolerated.   Continue Haldol 5 mg twice daily to address psychosis   Continue Lexapro 20 mg daily to address depression and anxiety  Continue Propanolol 10 mg twice daily to address anxiety   Continue Melatonin 9 mg to address insomnia  Continue bowel regimen including Colace and Senna-Docusate sodium 50 mg tablet nightly for constipation    Patient pending ACT team linkage for discharge to his aunt's residence once MA funding comes through from the [facility].  No associated orders from this encounter found during lookback period of 72 hours.    Chronic idiopathic constipation  See above  No associated orders from this encounter found during lookback period of 72 hours.  GERD (gastroesophageal reflux disease)  Continue medical management  No associated orders from this encounter found during lookback period of 72 hours.  Tobacco abuse  Continue supportive therapy including promotion of smoking cessation    No associated orders from this encounter found during lookback period of 72 hours.  Elevated hemoglobin A1c  Continue medical management  No associated orders from this encounter found during lookback period of 72 hours.  Class 2 obesity in adult  Continue medical management  Orders from past 72 hours:    Inpatient Consult to Nutrition Services; Standing    Primary  hypertension  Continue medical management  No associated orders from this encounter found during lookback period of 72 hours.    Current medications:  Current Facility-Administered Medications   Medication Dose Route Frequency Provider Last Rate    acetaminophen  650 mg Oral Q4H PRN Jordan C Holter, DO      acetaminophen  650 mg Oral Q6H PRN HOLLI Lion      aluminum-magnesium hydroxide-simethicone  30 mL Oral Q4H PRN Jordan C Holter,       amLODIPine  5 mg Oral Daily HOLLI Lion      Artificial Tears  1 drop Both Eyes Q3H PRN Jordan C Holter, DO      haloperidol lactate  2.5 mg Intramuscular Q4H PRN Max 4/day HOLLI Lion      And    LORazepam  1 mg Intramuscular Q4H PRN Max 4/day HOLLI Lion      And    benztropine  0.5 mg Intramuscular Q4H PRN Max 4/day HOLLI Lion      haloperidol lactate  5 mg Intramuscular Q4H PRN Max 4/day HOLLI Lion      And    LORazepam  2 mg Intramuscular Q4H PRN Max 4/day HOLLI Lion      And    benztropine  1 mg Intramuscular Q4H PRN Max 4/day HOLLI Lion      benztropine  0.5 mg Oral BID Jordan C Holter, DO      bisacodyl  10 mg Rectal Daily PRN HOLLI Lion      calcium carbonate  500 mg Oral BID PRN HOLLI Monge      cloZAPine  275 mg Oral HS Jordan C Holter, DO      hydrOXYzine HCL  50 mg Oral Q6H PRN Max 4/day Jordan C Holter, DO      Or    diphenhydrAMINE  50 mg Intramuscular Q6H PRN Jordan C Holter, DO      hydrOXYzine HCL  50 mg Oral Q6H PRN Max 4/day HOLLI Lion      Or    diphenhydrAMINE  50 mg Intramuscular Q6H PRN HOLLI Lion      diphenhydrAMINE-zinc acetate   Topical BID PRN HOLLI Lion      docusate sodium  100 mg Oral BID Jourdan Dickens, DO      escitalopram  20 mg Oral Daily HOLLI Lion      famotidine  20 mg Oral BID PRN HOLLI Monge      fluticasone  1 spray Each Nare Daily PRN HOLLI Monge      haloperidol  1 mg Oral Q6H PRN  HOLLI Lion      haloperidol  2.5 mg Oral Q4H PRN Max 4/day HOLLI Lion      haloperidol  5 mg Oral Q4H PRN Max 4/day HOLLI Lion      haloperidol  5 mg Oral BID [facility] Holter, DO      hydroCHLOROthiazide  12.5 mg Oral Daily HOLLI Galvan      hydrocortisone   Topical 4x Daily PRN HOLLI Lion      hydrOXYzine HCL  100 mg Oral Q6H PRN Max 4/day [facility] Holter, DO      Or    LORazepam  2 mg Intramuscular Q6H PRN [facility] Holter, DO      hydrOXYzine HCL  100 mg Oral Q6H PRN Max 4/day HOLLI Lion      Or    LORazepam  2 mg Intramuscular Q6H PRN HOLLI Lion      hydrOXYzine HCL  25 mg Oral Q6H PRN Max 4/day [facility] Holter, DO      ibuprofen  600 mg Oral Q8H PRN HOLLI Lion      influenza vaccine  0.5 mL Intramuscular Once Bora Rosario MD      loratadine  10 mg Oral Daily Brina Guillen MD      magnesium hydroxide  30 mL Oral Once Jourdan Dickens, DO      melatonin  3 mg Oral HS PRN Bora Rosario MD      melatonin  9 mg Oral HS Bora Rosario MD      methocarbamol  500 mg Oral Q6H PRN HOLLI Lion      multivitamin-minerals  1 tablet Oral Daily HOLLI Monge      nicotine polacrilex  2 mg Oral Q4H PRN Bora Rosario MD      OLANZapine  5 mg Oral Q4H PRN Max 3/day [facility] Holter, DO      Or    OLANZapine  2.5 mg Intramuscular Q4H PRN Max 3/day [facility] Holter, DO      OLANZapine  5 mg Oral Q3H PRN Max 3/day [facility] Holter, DO      Or    OLANZapine  5 mg Intramuscular Q3H PRN Max 3/day [facility] Holter, DO      OLANZapine  2.5 mg Oral Q4H PRN Max 6/day [facility] Holter, DO      ondansetron  4 mg Oral Q6H PRN HOLLI Lion      pantoprazole  20 mg Oral QAM [facility] Holter, DO      polyethylene glycol  17 g Oral Daily PRN HOLLI Ghotra      polyethylene glycol  17 g Oral Daily Jourdan Dickens, DO      propranolol  10 mg Oral Q12H KAYLIE HOLLI Lion      saliva substitute  5 spray Mouth/Throat 4x Daily PRN Mary Jo Reich PA-C       senna-docusate sodium  1 tablet Oral Daily PRN Jordan C Holter,       senna-docusate sodium  2 tablet Oral HS Jourdan Dickens,       traZODone  50 mg Oral HS PRN HOLLI Lion      white petrolatum-mineral oil   Topical TID PRN HOLLI Lion          Risks/Benefits of Treatment:     Risks, benefits, and possible side effects of medications explained to patient and patient verbalizes understanding and agreement for treatment.    Progress Toward Goals: progressing    Treatment Planning:     All current active medications have been reviewed.  Continue to monitor response to treatment and assess for potential side effects of medications.  Encourage group therapy, milieu therapy and occupational therapy  Collaboration with medical service for medical comorbidities as indicated.  Behavioral Health checks for safety monitoring.    Estimated Discharge Day: 3/31/2025       Subjective : Patient was seen for continuing care and treatment.  Case was discussed with the nursing staff.  Patient still is reporting auditory hallucinations voices telling him that they are going to kill him and his family.  However, again, as stated yesterday, patient reports less loud voices.  Cooperative with care and treatment.  Continues to require inpatient treatment.  Advised to be slightly less aggressive when utilizing the punching bag secondary to an injury he experienced previous.    Behavior over the last 24 hours: unchanged.    Sleep: Adequate  Appetite:  Adequate  Medication side effects: No  ROS: review of systems as noted above in HPI/Subjective report, all other systems are negative    Objective :  Temp:  [97.6 °F (36.4 °C)-97.8 °F (36.6 °C)] 97.6 °F (36.4 °C)  HR:  [] 88  BP: (122-134)/(62-84) 122/75  Resp:  [18] 18  SpO2:  [98 %-99 %] 99 %  O2 Device: None (Room air)    Temp:  [97.6 °F (36.4 °C)-97.8 °F (36.6 °C)] 97.6 °F (36.4 °C)  HR:  [] 88  BP: (122-134)/(62-84) 122/75  Resp:  [18] 18  SpO2:  [98  %-99 %] 99 %  O2 Device: None (Room air)    Mental Status Evaluation:    Appearance:  disheveled   Behavior:  cooperative, calm   Speech:  scant, soft   Mood:  depressed, anxious   Affect:  constricted   Thought Process:  disorganized, perseverative   Thought Content:  paranoid ideation   Perceptual Disturbances: auditory hallucinations    Risk Potential: Suicidal Ideation -  None  Homicidal Ideation -  None  Potential for Aggression - No   Sensorium:  oriented to person, place, time/date, and situation   Memory:  recent and remote memory grossly intact   Consciousness:  alert and awake   Attention/Concentration: attention span and concentration appear shorter than expected for age   Insight:  limited   Judgment: limited   Gait/Station: normal gait/station, normal balance   Motor Activity: no abnormal movements       Lab Results: I have reviewed the following results:  Most Recent Labs:   Lab Results   Component Value Date    WBC 7.82 02/18/2025    RBC 5.80 (H) 02/18/2025    HGB 13.4 02/18/2025    HCT 45.1 02/18/2025     02/18/2025    RDW 14.0 02/18/2025    NEUTROABS 4.08 02/18/2025    SODIUM 140 10/16/2024    K 3.8 10/16/2024     10/16/2024    CO2 29 10/16/2024    BUN 9 10/16/2024    CREATININE 0.91 10/16/2024    GLUC 94 10/16/2024    CALCIUM 9.5 10/16/2024    AST 26 09/30/2024    ALT 42 09/30/2024    ALKPHOS 64 09/30/2024    TP 6.6 09/30/2024    ALB 3.8 09/30/2024    TBILI 0.47 09/30/2024    CHOLESTEROL 156 03/14/2024    HDL 44 03/14/2024    TRIG 133 03/14/2024    LDLCALC 85 03/14/2024    NONHDLC 112 03/14/2024    LITHIUM 0.61 01/09/2024    LEE8HSJPVXSJ 1.062 11/15/2023    SYPHILISAB Non-reactive 11/18/2023    HGBA1C 5.0 04/01/2024    EAG 97 04/01/2024       Administrative Statements     Counseling / Coordination of Care:  I have spent a total time of 30 minutes in caring for this patient on the day of the visit/encounter including:  Patient's progress discussed with staff in treatment team  meeting.  Medication changes reviewed with staff in treatment team meeting..    HOLLI Castelan 03/01/25

## 2025-03-01 NOTE — ASSESSMENT & PLAN NOTE
Continue psychotropic medications including:   Continue Clozapine 275 mg nightly to confer mood stability in addition to addressing psychosis. Continue upward titration.   02/18: , BNP 25, CRP 10, ANC 4.08  02/04:, BNP 10, CRP 9.5,ANC 4.42  CBC w/diff and CK every 2 weeks  Continue Cogentin 0.5 mg twice daily for EPS signs/symptoms. Upward titration as necessary and as tolerated.   Continue Haldol 5 mg twice daily to address psychosis   Continue Lexapro 20 mg daily to address depression and anxiety  Continue Propanolol 10 mg twice daily to address anxiety   Continue Melatonin 9 mg to address insomnia  Continue bowel regimen including Colace and Senna-Docusate sodium 50 mg tablet nightly for constipation    Patient pending ACT team linkage for discharge to his aunt's residence once MA funding comes through from the [facility].  No associated orders from this encounter found during lookback period of 72 hours.

## 2025-03-01 NOTE — NURSING NOTE
Did not attend [facility] or [facility]. Social with select peers. Ate 100% of his meal. Took medication without difficulty. Auditory hallucinations continue. He has not required any prn medication, except for Nicotine gum. Nicotine gum given at 1720. No issues or behaviors. Continue to monitor. Precautions maintained.

## 2025-03-02 PROCEDURE — 99232 SBSQ HOSP IP/OBS MODERATE 35: CPT | Performed by: PHYSICIAN ASSISTANT

## 2025-03-02 RX ADMIN — SENNOSIDES AND DOCUSATE SODIUM 2 TABLET: 50; 8.6 TABLET ORAL at 21:26

## 2025-03-02 RX ADMIN — BENZTROPINE MESYLATE 0.5 MG: 0.5 TABLET ORAL at 17:23

## 2025-03-02 RX ADMIN — BENZTROPINE MESYLATE 0.5 MG: 0.5 TABLET ORAL at 08:58

## 2025-03-02 RX ADMIN — HYDROCHLOROTHIAZIDE 12.5 MG: 12.5 TABLET ORAL at 08:59

## 2025-03-02 RX ADMIN — NICOTINE POLACRILEX 2 MG: 2 GUM, CHEWING ORAL at 12:32

## 2025-03-02 RX ADMIN — NICOTINE POLACRILEX 2 MG: 2 GUM, CHEWING ORAL at 17:26

## 2025-03-02 RX ADMIN — DOCUSATE SODIUM 100 MG: 100 CAPSULE, LIQUID FILLED ORAL at 17:23

## 2025-03-02 RX ADMIN — PANTOPRAZOLE SODIUM 20 MG: 20 TABLET, DELAYED RELEASE ORAL at 08:59

## 2025-03-02 RX ADMIN — MULTIPLE VITAMINS W/ MINERALS TAB 1 TABLET: TAB ORAL at 08:57

## 2025-03-02 RX ADMIN — CLOZAPINE 275 MG: 25 TABLET ORAL at 21:26

## 2025-03-02 RX ADMIN — PROPRANOLOL HYDROCHLORIDE 10 MG: 10 TABLET ORAL at 21:27

## 2025-03-02 RX ADMIN — PROPRANOLOL HYDROCHLORIDE 10 MG: 10 TABLET ORAL at 08:58

## 2025-03-02 RX ADMIN — DOCUSATE SODIUM 100 MG: 100 CAPSULE, LIQUID FILLED ORAL at 08:59

## 2025-03-02 RX ADMIN — TRAZODONE HYDROCHLORIDE 50 MG: 50 TABLET ORAL at 08:57

## 2025-03-02 RX ADMIN — NICOTINE POLACRILEX 2 MG: 2 GUM, CHEWING ORAL at 21:31

## 2025-03-02 RX ADMIN — MELATONIN TAB 3 MG 9 MG: 3 TAB at 21:27

## 2025-03-02 RX ADMIN — HALOPERIDOL 5 MG: 5 TABLET ORAL at 08:58

## 2025-03-02 RX ADMIN — ESCITALOPRAM OXALATE 20 MG: 10 TABLET, FILM COATED ORAL at 08:58

## 2025-03-02 RX ADMIN — LORATADINE 10 MG: 10 TABLET ORAL at 08:59

## 2025-03-02 RX ADMIN — HALOPERIDOL 5 MG: 5 TABLET ORAL at 17:23

## 2025-03-02 NOTE — NURSING NOTE
Pt is calm and cooperative, visible on the unit, social with peers and staff. Pt reports feeling anxious and depressed, reports AH voices telling him they are going to kill him when he gets out the hospital. Pt is medication compliant, safety checks ongoing.

## 2025-03-02 NOTE — ASSESSMENT & PLAN NOTE
Evaluated March 2, 2025 most recent weight 242 pounds on March 2, 2025, continue dietary management  Orders from past 72 hours:    Inpatient Consult to Nutrition Services; Standing

## 2025-03-02 NOTE — ASSESSMENT & PLAN NOTE
Evaluated March 2, 2025, continue Norvasc 5 mg daily and hydrochlorothiazide 12.5 mg daily  No associated orders from this encounter found during lookback period of 72 hours.

## 2025-03-02 NOTE — PLAN OF CARE
Problem: Alteration in Thoughts and Perception  Goal: Agree to be compliant with medication regime, as prescribed and report medication side effects  Description: Interventions:  - Offer appropriate PRN medication and supervise ingestion; conduct AIMS, as needed   Outcome: Progressing     Problem: Ineffective Coping  Goal: Demonstrates healthy coping skills  Outcome: Progressing  Goal: Participates in unit activities  Description: Interventions:  - Provide therapeutic environment   - Provide required programming   - Redirect inappropriate behaviors   Outcome: Progressing     Problem: Depression  Goal: Refrain from harming self  Description: Interventions:  - Monitor patient closely, per order   - Supervise medication ingestion, monitor effects and side effects   Outcome: Progressing  Goal: Refrain from isolation  Description: Interventions:  - Develop a trusting relationship   - Encourage socialization   Outcome: Progressing  Goal: Refrain from self-neglect  Outcome: Progressing     Problem: Anxiety  Goal: Anxiety is at manageable level  Description: Interventions:  - Assess and monitor patient's anxiety level.   - Monitor for signs and symptoms (heart palpitations, chest pain, shortness of breath, headaches, nausea, feeling jumpy, restlessness, irritable, apprehensive).   - Collaborate with interdisciplinary team and initiate plan and interventions as ordered.  - [place] patient to unit/surroundings  - Explain treatment plan  - Encourage participation in care  - Encourage verbalization of concerns/fears  - Identify coping mechanisms  - Assist in developing anxiety-reducing skills  - Administer/offer alternative therapies  - Limit or eliminate stimulants  Outcome: Progressing     Problem: Alteration in Orientation  Goal: Interact with staff daily  Description: Interventions:  - Assess and re-assess patient's level of orientation  - Engage patient in 1 on 1 interactions, daily, for a minimum of 15 minutes   -  Establish rapport/trust with patient   Outcome: Progressing  Goal: Complete daily ADLs, including personal hygiene independently, as able  Description: Interventions:  - Observe, teach, and assist patient with ADLS  Outcome: Progressing

## 2025-03-02 NOTE — ASSESSMENT & PLAN NOTE
Evaluated March 2, 2025.  Continues with residual auditory hallucinations.  Continue psychotropic medications including:   Continue Clozapine 275 mg nightly to confer mood stability in addition to addressing psychosis.   02/18: , BNP 25, CRP 10, ANC 4.08  CBC w/diff and CK every 2 weeks  Continue Cogentin 0.5 mg twice daily for EPS signs/symptoms.   Continue Haldol 5 mg twice daily to address psychosis   Continue Lexapro 20 mg daily to address depression and anxiety  Continue Propanolol 10 mg twice daily to address anxiety   Continue Melatonin 9 mg to address insomnia  On 2 antipsychotics due to subtherapeutic effects with monotherapy  Continue discharge planning: Patient pending ACT team linkage for discharge to his aunt's residence once MA funding comes through from the [facility].  No associated orders from this encounter found during lookback period of 72 hours.

## 2025-03-02 NOTE — PROGRESS NOTES
Progress Note - Behavioral Health   Name: Alberto Berumen 29 y.o. male I MRN: [id number]  Unit/Bed#: [facility] 112-02 I Date of Admission: 3/29/2024   Date of Service: 3/2/2025 I Hospital Day: 338    Assessment & Plan  Schizoaffective disorder, bipolar type (HCC)  Evaluated March 2, 2025.  Continues with residual auditory hallucinations.  Continue psychotropic medications including:   Continue Clozapine 275 mg nightly to confer mood stability in addition to addressing psychosis.   02/18: , BNP 25, CRP 10, ANC 4.08  CBC w/diff and CK every 2 weeks  Continue Cogentin 0.5 mg twice daily for EPS signs/symptoms.   Continue Haldol 5 mg twice daily to address psychosis   Continue Lexapro 20 mg daily to address depression and anxiety  Continue Propanolol 10 mg twice daily to address anxiety   Continue Melatonin 9 mg to address insomnia  On 2 antipsychotics due to subtherapeutic effects with monotherapy  Continue discharge planning: Patient pending ACT team linkage for discharge to his aunt's residence once MA funding comes through from the [facility].  No associated orders from this encounter found during lookback period of 72 hours.    Chronic idiopathic constipation  Evaluated March 2, 2025, continue Senokot 2 tablets at bedtime, MiraLAX 17 g daily, and Colace twice daily  No associated orders from this encounter found during lookback period of 72 hours.  Tobacco abuse  Evaluated March 2, 2025, continue nicotine gum and continue supportive therapy including promotion of smoking cessation    No associated orders from this encounter found during lookback period of 72 hours.  Class 2 obesity in adult  Evaluated March 2, 2025 most recent weight 242 pounds on March 2, 2025, continue dietary management  Orders from past 72 hours:    Inpatient Consult to Nutrition Services; Standing    Primary hypertension  Evaluated March 2, 2025, continue Norvasc 5 mg daily and hydrochlorothiazide 12.5 mg daily  No associated  orders from this encounter found during lookback period of 72 hours.    Current medications:  Current Facility-Administered Medications   Medication Dose Route Frequency Provider Last Rate    acetaminophen  650 mg Oral Q4H PRN Jordan C Holter, DO      acetaminophen  650 mg Oral Q6H PRN HOLLI Lion      aluminum-magnesium hydroxide-simethicone  30 mL Oral Q4H PRN Jordan C Holter,       amLODIPine  5 mg Oral Daily HOLLI Lion      Artificial Tears  1 drop Both Eyes Q3H PRN Jordan C Holter, DO      haloperidol lactate  2.5 mg Intramuscular Q4H PRN Max 4/day HOLLI Lion      And    LORazepam  1 mg Intramuscular Q4H PRN Max 4/day HOLLI Lion      And    benztropine  0.5 mg Intramuscular Q4H PRN Max 4/day HOLLI Lion      haloperidol lactate  5 mg Intramuscular Q4H PRN Max 4/day HOLLI Lion      And    LORazepam  2 mg Intramuscular Q4H PRN Max 4/day HOLLI Lion      And    benztropine  1 mg Intramuscular Q4H PRN Max 4/day HOLLI Lion      benztropine  0.5 mg Oral BID Jordan C Holter,       bisacodyl  10 mg Rectal Daily PRN HOLLI Lion      calcium carbonate  500 mg Oral BID PRN HOLLI Monge      cloZAPine  275 mg Oral HS Jordan C Holter, DO      hydrOXYzine HCL  50 mg Oral Q6H PRN Max 4/day Jordan C Holter,       Or    diphenhydrAMINE  50 mg Intramuscular Q6H PRN Jordan C Holter,       hydrOXYzine HCL  50 mg Oral Q6H PRN Max 4/day HOLLI Lion      Or    diphenhydrAMINE  50 mg Intramuscular Q6H PRN HOLLI Lion      diphenhydrAMINE-zinc acetate   Topical BID PRN HOLLI Lion      docusate sodium  100 mg Oral BID Jourdan Dickens, DO      escitalopram  20 mg Oral Daily HOLLI Lion      famotidine  20 mg Oral BID PRN HOLLI Monge      fluticasone  1 spray Each Nare Daily PRN HOLLI Monge      haloperidol  1 mg Oral Q6H PRN HOLLI Lion      haloperidol  2.5 mg Oral Q4H PRN Max  4/day HOLLI Lion      haloperidol  5 mg Oral Q4H PRN Max 4/day HOLLI Lion      haloperidol  5 mg Oral BID [facility] Holter, DO      hydroCHLOROthiazide  12.5 mg Oral Daily HOLLI Galvan      hydrocortisone   Topical 4x Daily PRN HOLLI Lion      hydrOXYzine HCL  100 mg Oral Q6H PRN Max 4/day [facility] Holter, DO      Or    LORazepam  2 mg Intramuscular Q6H PRN [facility] Holter, DO      hydrOXYzine HCL  100 mg Oral Q6H PRN Max 4/day HOLLI Lion      Or    LORazepam  2 mg Intramuscular Q6H PRN HOLLI Lion      hydrOXYzine HCL  25 mg Oral Q6H PRN Max 4/day [facility] Holter, DO      ibuprofen  600 mg Oral Q8H PRN HOLLI Lion      influenza vaccine  0.5 mL Intramuscular Once Bora Rosario MD      loratadine  10 mg Oral Daily Brina Guillen MD      magnesium hydroxide  30 mL Oral Once Jourdan Dickens,       melatonin  3 mg Oral HS PRN Bora Rosario MD      melatonin  9 mg Oral HS Bora Rosario MD      methocarbamol  500 mg Oral Q6H PRN HOLLI Lion      multivitamin-minerals  1 tablet Oral Daily HOLLI Monge      nicotine polacrilex  2 mg Oral Q4H PRN Bora Rosario MD      OLANZapine  5 mg Oral Q4H PRN Max 3/day [facility] Holter, DO      Or    OLANZapine  2.5 mg Intramuscular Q4H PRN Max 3/day [facility] Holter, DO      OLANZapine  5 mg Oral Q3H PRN Max 3/day [facility] Holter, DO      Or    OLANZapine  5 mg Intramuscular Q3H PRN Max 3/day [facility] Holter, DO      OLANZapine  2.5 mg Oral Q4H PRN Max 6/day [facility] Holter, DO      ondansetron  4 mg Oral Q6H PRN HOLLI Lion      [facility]antoprazole  [facility]      polyethylene glycol  17 g Oral Daily PRN HOLLI Ghotra      polyethylene glycol  17 g Oral Daily Jourdan Dickens, DO      propranolol  10 mg Oral Q12H KAYLIE HOLLI Lion      saliva substitute  5 spray Mouth/Throat 4x Daily PRN Mary Jo Reich PA-C      senna-docusate sodium  1 tablet Oral Daily JIM FISCHER  Holter,       senna-docusate sodium  2 tablet Oral HS Jourdan Jaiden Dickens, DO      traZODone  50 mg Oral HS PRN HOLLI Lion      white petrolatum-mineral oil   Topical TID PRN HOLLI Lion          Risks/Benefits of Treatment:     Risks, benefits, and possible side effects of medications explained to patient. Patient has limited understanding of risks and benefits of treatment at this time, but agrees to take medications as prescribed.    Progress Toward Goals: Minimal change, discharge planning is ongoing, ACT referral pending    Treatment Planning:     All current active medications have been reviewed.  Continue to monitor response to treatment and assess for potential side effects of medications.  Encourage group therapy, milieu therapy and occupational therapy  Collaboration with medical service for medical comorbidities as indicated.  Behavioral Health checks for safety monitoring.  Long Stay Certification: Discharge to an unsupervised setting will represent a significant deterioration in ability to function and present a severe risk to the patient's physical and mental health, due to the patient's chronic psychiatric illness (acute psychosis) and ongoing psychotic symptoms. The patient cannot be safety treated at a lower level of care and requires further psychiatric evaluation, medication treatment and other treatment which can be reasonably be provided only in a psychiatric hospital  Estimated Discharge Day: 3/31/2025       Felipe Dominguez was seen for follow-up, chart reviewed and discussed with nursing staff.  Nursing staff notes he continues with residual auditory hallucinations.  He has been cooperative with medications and treatment plan.  Attended 5 out of 8 groups yesterday.  Sleeping adequately.  Typically he refuses breakfast but eats 100% for lunch and dinner.  Had reported hand pain last week but none yesterday or today.  No aggressive or agitated behavior.    Alberto was seen  "lying in bed.  Had a sheet over his head and speech was mumbling.  Tells me he feels \"stable\".  States he continues with auditory hallucinations though stating \"they said they are going to kill me\".  Denies any command hallucinations or visual hallucinations.  States that the hallucinations are chronic and have overall improved since being here in the hospital.  Physically he denies any pain or discomfort and denies any side effects with medications.  Behavior over the last 24 hours: unchanged.    Sleep: normal  Appetite: normal  Medication side effects: No  ROS: review of systems as noted above in HPI/Subjective report, all other systems are negative    Objective :  Temp:  [97.9 °F (36.6 °C)] 97.9 °F (36.6 °C)  HR:  [] 78  BP: (119-122)/(56-75) 119/56  SpO2:  [99 %] 99 %  O2 Device: None (Room air)    Temp:  [97.9 °F (36.6 °C)] 97.9 °F (36.6 °C)  HR:  [] 78  BP: (119-122)/(56-75) 119/56  SpO2:  [99 %] 99 %  O2 Device: None (Room air)    Mental Status Evaluation:    Appearance:  Lying in bed, sheet overhead   Behavior:  Dismissive, guarded   Speech:  scant, soft, garbled   Mood:  dysphoric   Affect:  constricted   Thought Process:  concrete   Thought Content:  paranoid ideation   Perceptual Disturbances: auditory hallucinations    Risk Potential: Suicidal Ideation -  None at present  Homicidal Ideation -  None at present  Potential for Aggression - No   Sensorium:  oriented to person, place, and time/date   Memory:  recent and remote memory grossly intact   Consciousness:  alert and awake   Attention/Concentration: attention span and concentration appear shorter than expected for age   Insight:  limited   Judgment: limited   Gait/Station: Not observed, lying in bed   Motor Activity: no abnormal movements       Lab Results: I have reviewed the following results:  CBC:   Lab Results   Component Value Date    WBC 7.82 02/18/2025    RBC 5.80 (H) 02/18/2025    HGB 13.4 02/18/2025    HCT 45.1 02/18/2025    MCV " 78 (L) 02/18/2025     02/18/2025    MCH 23.1 (L) 02/18/2025    MCHC 29.7 (L) 02/18/2025    RDW 14.0 02/18/2025    MPV 10.3 02/18/2025    NEUTROABS 4.08 02/18/2025     CMP:   Lab Results   Component Value Date    SODIUM 140 10/16/2024    K 3.8 10/16/2024     10/16/2024    CO2 29 10/16/2024    AGAP 9 10/16/2024    BUN 9 10/16/2024    CREATININE 0.91 10/16/2024    GLUC 94 10/16/2024    GLUF 94 10/16/2024    CALCIUM 9.5 10/16/2024    AST 26 09/30/2024    ALT 42 09/30/2024    ALKPHOS 64 09/30/2024    TP 6.6 09/30/2024    ALB 3.8 09/30/2024    TBILI 0.47 09/30/2024    EGFR 115 10/16/2024     Clozapine:   Lab Results   Component Value Date    CLOZAPINE 288 (L) 11/19/2024       Administrative Statements     Counseling / Coordination of Care:  Patient's progress discussed with staff in treatment team meeting.  Medication changes reviewed with staff in treatment team meeting..    Monica Artis PA-C 03/02/25

## 2025-03-02 NOTE — NURSING NOTE
Behaviors controlled and appropriate all shift. Patient visible all evening with peers watching television in the dining room and watching [employer] games in the living room. Cooperative and pleasant upon approach. Medication compliant. Offered no complaints. No prn medication requested or required.

## 2025-03-02 NOTE — ASSESSMENT & PLAN NOTE
Evaluated March 2, 2025, continue nicotine gum and continue supportive therapy including promotion of smoking cessation    No associated orders from this encounter found during lookback period of 72 hours.

## 2025-03-02 NOTE — ASSESSMENT & PLAN NOTE
Evaluated March 2, 2025, continue Senokot 2 tablets at bedtime, MiraLAX 17 g daily, and Colace twice daily  No associated orders from this encounter found during lookback period of 72 hours.

## 2025-03-03 PROCEDURE — 97110 THERAPEUTIC EXERCISES: CPT

## 2025-03-03 PROCEDURE — 99232 SBSQ HOSP IP/OBS MODERATE 35: CPT | Performed by: PSYCHIATRY & NEUROLOGY

## 2025-03-03 RX ADMIN — ESCITALOPRAM OXALATE 20 MG: 10 TABLET, FILM COATED ORAL at 08:49

## 2025-03-03 RX ADMIN — SENNOSIDES AND DOCUSATE SODIUM 2 TABLET: 50; 8.6 TABLET ORAL at 21:05

## 2025-03-03 RX ADMIN — NICOTINE POLACRILEX 2 MG: 2 GUM, CHEWING ORAL at 08:50

## 2025-03-03 RX ADMIN — NICOTINE POLACRILEX 2 MG: 2 GUM, CHEWING ORAL at 12:24

## 2025-03-03 RX ADMIN — BENZTROPINE MESYLATE 0.5 MG: 0.5 TABLET ORAL at 17:09

## 2025-03-03 RX ADMIN — BENZTROPINE MESYLATE 0.5 MG: 0.5 TABLET ORAL at 08:50

## 2025-03-03 RX ADMIN — MELATONIN TAB 3 MG 9 MG: 3 TAB at 21:05

## 2025-03-03 RX ADMIN — PANTOPRAZOLE SODIUM 20 MG: 20 TABLET, DELAYED RELEASE ORAL at 08:50

## 2025-03-03 RX ADMIN — HALOPERIDOL 5 MG: 5 TABLET ORAL at 08:50

## 2025-03-03 RX ADMIN — LORATADINE 10 MG: 10 TABLET ORAL at 08:49

## 2025-03-03 RX ADMIN — CLOZAPINE 275 MG: 25 TABLET ORAL at 21:04

## 2025-03-03 RX ADMIN — NICOTINE POLACRILEX 2 MG: 2 GUM, CHEWING ORAL at 17:15

## 2025-03-03 RX ADMIN — NICOTINE POLACRILEX 2 MG: 2 GUM, CHEWING ORAL at 21:05

## 2025-03-03 RX ADMIN — DOCUSATE SODIUM 100 MG: 100 CAPSULE, LIQUID FILLED ORAL at 08:49

## 2025-03-03 RX ADMIN — HALOPERIDOL 5 MG: 5 TABLET ORAL at 17:09

## 2025-03-03 RX ADMIN — PROPRANOLOL HYDROCHLORIDE 10 MG: 10 TABLET ORAL at 21:05

## 2025-03-03 RX ADMIN — DOCUSATE SODIUM 100 MG: 100 CAPSULE, LIQUID FILLED ORAL at 17:09

## 2025-03-03 RX ADMIN — MULTIPLE VITAMINS W/ MINERALS TAB 1 TABLET: TAB ORAL at 08:50

## 2025-03-03 NOTE — ASSESSMENT & PLAN NOTE
Evaluated 3/3/25  most recent weight 242 pounds on March 2, 2025, continue dietary management  Orders from past 72 hours:    Inpatient Consult to Nutrition Services; Standing

## 2025-03-03 NOTE — PLAN OF CARE
Problem: Alteration in Thoughts and Perception  Goal: Agree to be compliant with medication regime, as prescribed and report medication side effects  Description: Interventions:  - Offer appropriate PRN medication and supervise ingestion; conduct AIMS, as needed   Outcome: Progressing     Problem: Ineffective Coping  Goal: Participates in unit activities  Description: Interventions:  - Provide therapeutic environment   - Provide required programming   - Redirect inappropriate behaviors   Outcome: Progressing     Problem: Depression  Goal: Refrain from harming self  Description: Interventions:  - Monitor patient closely, per order   - Supervise medication ingestion, monitor effects and side effects   Outcome: Progressing  Goal: Refrain from isolation  Description: Interventions:  - Develop a trusting relationship   - Encourage socialization   Outcome: Progressing  Goal: Refrain from self-neglect  Outcome: Progressing     Problem: Anxiety  Goal: Anxiety is at manageable level  Description: Interventions:  - Assess and monitor patient's anxiety level.   - Monitor for signs and symptoms (heart palpitations, chest pain, shortness of breath, headaches, nausea, feeling jumpy, restlessness, irritable, apprehensive).   - Collaborate with interdisciplinary team and initiate plan and interventions as ordered.  - [place] patient to unit/surroundings  - Explain treatment plan  - Encourage participation in care  - Encourage verbalization of concerns/fears  - Identify coping mechanisms  - Assist in developing anxiety-reducing skills  - Administer/offer alternative therapies  - Limit or eliminate stimulants  Outcome: Progressing     Problem: Alteration in Orientation  Goal: Interact with staff daily  Description: Interventions:  - Assess and re-assess patient's level of orientation  - Engage patient in 1 on 1 interactions, daily, for a minimum of 15 minutes   - Establish rapport/trust with patient   Outcome: Progressing      Problem: Alteration in Thoughts and Perception  Goal: Treatment Goal: Gain control of psychotic behaviors/thinking, reduce/eliminate presenting symptoms and demonstrate improved reality functioning upon discharge  Outcome: Not Progressing     Problem: Depression  Goal: Treatment Goal: Demonstrate behavioral control of depressive symptoms, verbalize feelings of improved mood/affect, and adopt new coping skills prior to discharge  Outcome: Not Progressing

## 2025-03-03 NOTE — ASSESSMENT & PLAN NOTE
Evaluated 3/3/25, continue nicotine gum and continue supportive therapy including promotion of smoking cessation    No associated orders from this encounter found during lookback period of 72 hours.

## 2025-03-03 NOTE — CONSULTS
Pt continues to refuse breakfast frequently, Pt requesting Ensure Max come with dinner tray instead of breakfast, will make adjustment, wt is stable over past 2 weeks, 2/16/25 242# , 3/2/25 242#

## 2025-03-03 NOTE — NURSING NOTE
Pt calm and pleasant brightens on approach. Pt visible in milieu social with peers and staff. Pt endorses anxiety and depression. Pt reports audio hallucinations.. Pt cooperative with care and medication compliant.

## 2025-03-03 NOTE — PROGRESS NOTES
03/03/25 0930   Team Meeting   Meeting Type Tx Team Meeting   Initial Conference Date 03/03/25   Next Conference Date 03/17/25   Team Members Present   Team Members Present Physician;Nurse;;Other (Discipline and Name)   Physician Team Member Holter   Nursing Team Member Claudia   Social Work Team Member Jose J ORTEGA   Other (Discipline and Name) Gaby CMP MHDS   Patient/Family Present   Patient Present Yes   Patient's Family Present No   OTHER   Team Meeting - Additional Comments Patient attened his treatment team meeting. Patient reviewed his completed self assessment. Patient discussed continued depression, realted to his continued admission. This writer discussed with [facility], patient's status for [facility] funding. [facility] reports they are awaiting [place] [facility] to have an open, possible March or April.

## 2025-03-03 NOTE — ASSESSMENT & PLAN NOTE
Evaluated 3/3/2025.  Continues with residual auditory hallucinations. Reports continued depression.  Continue psychotropic medications including:   Continue Clozapine 275 mg nightly to confer mood stability in addition to addressing psychosis.   02/18: , BNP 25, CRP 10, ANC 4.08  CBC w/diff and CK every 2 weeks  Continue Cogentin 0.5 mg twice daily for EPS signs/symptoms.   Continue Haldol 5 mg twice daily to address psychosis   Continue Lexapro 20 mg daily to address depression and anxiety  Continue Propanolol 10 mg twice daily to address anxiety   Continue Melatonin 9 mg to address insomnia  On 2 antipsychotics due to subtherapeutic effects with monotherapy  Continue discharge planning: Patient pending ACT team linkage for discharge to his aunt's residence once MA funding comes through from the [facility].  No associated orders from this encounter found during lookback period of 72 hours.

## 2025-03-03 NOTE — ASSESSMENT & PLAN NOTE
Evaluated 3/3/25, continue Norvasc 5 mg daily and hydrochlorothiazide 12.5 mg daily  No associated orders from this encounter found during lookback period of 72 hours.

## 2025-03-03 NOTE — ASSESSMENT & PLAN NOTE
Evaluated 3/3/25, continue Senokot 2 tablets at bedtime, MiraLAX 17 g daily, and Colace twice daily  No associated orders from this encounter found during lookback period of 72 hours.

## 2025-03-03 NOTE — PROGRESS NOTES
03/03/25 0828   Team Meeting   Meeting Type Daily Rounds   Team Members Present   Team Members Present Physician;Nurse;;Other (Discipline and Name)   Physician Team Member Holter   Nursing Team Member Claudia   Social Work Team Member Jose J ORTEGA   Other (Discipline and Name) Wang PCM   Patient/Family Present   Patient Present No   Patient's Family Present No     Groups Participation  8/9.   Patient's compliant with medications. He's engaged in his treatment. He's social with his peers. He's pleasant. Awaiting [facility] funding spot on [facility] ACT team.

## 2025-03-03 NOTE — PHYSICAL THERAPY NOTE
Physical TherapyTreatment Note    Patient's Name: Alberto Berumen    Admitting Diagnosis  Major depressive disorder, single episode, unspecified [F32.9]  Auditory hallucinations [R44.0]    Problem List  Patient Active Problem List   Diagnosis    GERD (gastroesophageal reflux disease)    Schizoaffective disorder, bipolar type (HCC)    Tobacco abuse    T wave inversion in EKG    Syringoma    Chronic idiopathic constipation    Vitamin B 12 deficiency    Vitamin D deficiency    Confluent and reticulate papillomatosis    Class 2 obesity in adult    Primary hypertension    Elevated hemoglobin A1c    Bilateral lower extremity edema       Past Medical History  Past Medical History:   Diagnosis Date    Anemia     Chronic idiopathic constipation     GERD (gastroesophageal reflux disease)     Schizoaffective disorder (HCC)     Syringoma     T wave inversion in EKG        Past Surgical History  Past Surgical History:   Procedure Laterality Date    COLONOSCOPY         Recent Imaging  No orders to display       Recent Vital Signs  Vitals:    03/02/25 1557 03/02/25 2052 03/03/25 0753 03/03/25 1528   BP: 128/64 124/52 100/51 136/75   BP Location: Right arm  Right arm Right arm   Pulse: 86 75 64 88   Resp: 17  16 18   Temp: 97.9 °F (36.6 °C) (!) 97.3 °F (36.3 °C) 97.6 °F (36.4 °C) 97.9 °F (36.6 °C)   TempSrc: Temporal  Temporal Temporal   SpO2: 99% 99% 100% 98%   Weight:       Height:            03/03/25 1700   PT Last Visit   PT Visit Date 03/03/25   Note Type   Note Type Treatment   Pain Assessment   Pain Assessment Tool 0-10   Pain Score No Pain   Restrictions/Precautions   Weight Bearing Precautions Per Order No   General   Chart Reviewed Yes   Family/Caregiver Present No   Cognition   Arousal/Participation Alert   Attention Within functional limits   Following Commands Follows all commands and directions without difficulty   Assessment   Prognosis Good   Problem List Decreased strength   Assessment Pt provided HEP for  hand/thumb exercises. Exercises inlude: Three point squeeze with putty, key  squeeze, finger extension/thumb abduction with rubber band. All exercises 3x10 1x daily. Pt informed on benefits of exercises provided and how to perform. Pt reports less pain from previous meeting. RN notified on equipment and will be handed in when finished with exercises. Pt reponded well to exercises when working with PT.   Barriers to Discharge Decreased caregiver support;Inaccessible home environment   Goals   Patient Goals Decrease pain, increase strength in hand   PT Treatment Day 1   Plan   Treatment/Interventions   (Will check on pt next week)   Progress Progressing toward goals   AM-PAC Basic Mobility Inpatient   Turning in Flat Bed Without Bedrails 4   Lying on Back to Sitting on Edge of Flat Bed Without Bedrails 4   Moving Bed to Chair 4   Standing Up From Chair Using Arms 4   Walk in Room 4   Climb 3-5 Stairs With Railing 4   Basic Mobility Inpatient Raw Score 24   Basic Mobility Standardized Score 57.68   [facility] Highest Level Of Mobility   JH-HLM Goal 8: Walk 250 feet or more   JH-HLM Achieved 8: Walk 250 feet ot more   Education   Education Provided Mobility training;Home exercise program   Patient Demonstrates acceptance/verbal understanding;Explanation/teachback used;Demonstrates verbal understanding   End of Consult   Patient Position at End of Consult   (Seated in dining bautista)         PT Treatment Time: 10 minutes    Kwame Gimeenz, SPT

## 2025-03-03 NOTE — NURSING NOTE
Patient is visible on unit, social with staff and peers and attending groups. Pt reports no s/s, takes medications without issue. Pt currently with dietician at this time.

## 2025-03-03 NOTE — PROGRESS NOTES
Progress Note - Behavioral Health   Name: Alberto Berumen 29 y.o. male I MRN: [id number]  Unit/Bed#: [facility] 112-02 I Date of Admission: 3/29/2024   Date of Service: 3/3/2025 I Hospital Day: 339     Assessment & Plan  Schizoaffective disorder, bipolar type (HCC)  Evaluated 3/3/2025.  Continues with residual auditory hallucinations. Reports continued depression.  Continue psychotropic medications including:   Continue Clozapine 275 mg nightly to confer mood stability in addition to addressing psychosis.   02/18: , BNP 25, CRP 10, ANC 4.08  CBC w/diff and CK every 2 weeks  Continue Cogentin 0.5 mg twice daily for EPS signs/symptoms.   Continue Haldol 5 mg twice daily to address psychosis   Continue Lexapro 20 mg daily to address depression and anxiety  Continue Propanolol 10 mg twice daily to address anxiety   Continue Melatonin 9 mg to address insomnia  On 2 antipsychotics due to subtherapeutic effects with monotherapy  Continue discharge planning: Patient pending ACT team linkage for discharge to his aunt's residence once MA funding comes through from the [facility].  No associated orders from this encounter found during lookback period of 72 hours.    Chronic idiopathic constipation  Evaluated 3/3/25, continue Senokot 2 tablets at bedtime, MiraLAX 17 g daily, and Colace twice daily  No associated orders from this encounter found during lookback period of 72 hours.  Tobacco abuse  Evaluated 3/3/25, continue nicotine gum and continue supportive therapy including promotion of smoking cessation    No associated orders from this encounter found during lookback period of 72 hours.  Class 2 obesity in adult  Evaluated 3/3/25  most recent weight 242 pounds on March 2, 2025, continue dietary management  Orders from past 72 hours:    Inpatient Consult to Nutrition Services; Standing    Primary hypertension  Evaluated 3/3/25, continue Norvasc 5 mg daily and hydrochlorothiazide 12.5 mg daily  No associated orders  "from this encounter found during lookback period of 72 hours.  GERD (gastroesophageal reflux disease)  Continue medical management  No associated orders from this encounter found during lookback period of 72 hours.  Elevated hemoglobin A1c  Continue medical management  No associated orders from this encounter found during lookback period of 72 hours.    Plan:  Continue prescribed psychotropic medication regimen  Currently on 201 commitment status  Continue to promote patient participation in therapeutic milieu.  Continue medical management per medicine.  Continue behavioral health checks q.15 minutes.   Continue vitals per behavioral health unit protocol.  Safety: Safety and communication plan established to target dynamic risk factors discussed above.   Discharge and disposition: Following coordination with ACT services, discharge to aunt's home.     SUBJECTIVE     Patient evaluated this a.m. for continuity of care. Nursing reports no acute behaviors. Today, Alberto feels \"so-so.\" Patient had his treatment team meeting this morning and states he felt \"disappointed\" by some of the discussions about discharge and reports some increased depression from this. Denies increase in anxiety. Reports continued auditory hallucinations of voices stating they will kill him/family. Denies change in nature of the voices, command hallucinations, and visual hallucinations. Denies self-injurious thoughts/behaviors, suicidal ideation, and homicidal ideation at this time.     Alberto continues to endorse joint pain and body aches. He states they are worse at night and improve during the day. States his left wrist/hand is feeling better, PT evaluated and stated pain was attributed to him punching the punching bag. However, patient continues to believe these symptoms are from haldol. Patient educated on haldol side effects, the importance of exercise such as walking, and safety when using the punching bag. Does not want to use PRN pain " medications.    Patient reports decreased appetite and not eating dinner, nursing reported he ate 100% of dinner over the weekend. Denies nausea, vomiting, diarrhea, constipation, and fever. Sleeping well, last bowel movement yesterday without difficulty, engaged in his treatment. Medication compliant.    PSYCHIATRIC REVIEW OF SYSTEMS     Behavior over the last 24 hours: unchanged  Sleep: normal  Appetite: normal  Medication side effects:     Progress Toward Goals: Limited progress, as evidenced by their participation (or lack thereof) in individual, social and therapeutic milieu in addition to adherence to their medication regimen.  Patient Acceptance: Fair  Patient Understanding: Limited  Patient Involvement in Reintegration Process: Remains in contact with sisters and aunt via the phone.   Patient's Therapeutic Relationship with Psychiatrist: Trusting  Patient's Optimism Regarding Recovery: Following coordination with ACT services, discharge to aunt's home.   Group Attendance: 8/9    REVIEW OF SYSTEMS   Review of systems: Joint pain (knees, ankles, fingers) and body aches.    OBJECTIVE     Vital Signs in Past 24 Hours:  Temp:  [97.3 °F (36.3 °C)-97.9 °F (36.6 °C)] 97.6 °F (36.4 °C)  HR:  [64-86] 64  BP: (100-128)/(51-64) 100/51  Resp:  [16-17] 16  SpO2:  [99 %-100 %] 100 %  O2 Device: None (Room air)    Intake/Output in Past 24 hours:  No intake/output data recorded.  No intake/output data recorded.        Laboratory Results:  I have personally reviewed all pertinent laboratory/tests results.  Most Recent Labs:   Lab Results   Component Value Date    WBC 7.82 02/18/2025    RBC 5.80 (H) 02/18/2025    HGB 13.4 02/18/2025    HCT 45.1 02/18/2025     02/18/2025    RDW 14.0 02/18/2025    NEUTROABS 4.08 02/18/2025    SODIUM 140 10/16/2024    K 3.8 10/16/2024     10/16/2024    CO2 29 10/16/2024    BUN 9 10/16/2024    CREATININE 0.91 10/16/2024    GLUC 94 10/16/2024    GLUF 94 10/16/2024    CALCIUM 9.5  10/16/2024    AST 26 09/30/2024    ALT 42 09/30/2024    ALKPHOS 64 09/30/2024    TP 6.6 09/30/2024    ALB 3.8 09/30/2024    TBILI 0.47 09/30/2024    CHOLESTEROL 156 03/14/2024    HDL 44 03/14/2024    TRIG 133 03/14/2024    LDLCALC 85 03/14/2024    NONHDLC 112 03/14/2024    LITHIUM 0.61 01/09/2024    APH6JHGGAZNR 1.062 11/15/2023    HGBA1C 5.0 04/01/2024    EAG 97 04/01/2024       Behavioral Health Medications: all current active meds have been reviewed.    Current Facility-Administered Medications   Medication Dose Route Frequency Provider Last Rate    acetaminophen  650 mg Oral Q4H PRN Jordan C Holter, DO      acetaminophen  650 mg Oral Q6H PRN HOLLI Lion      aluminum-magnesium hydroxide-simethicone  30 mL Oral Q4H PRN Jordan C Holter,       amLODIPine  5 mg Oral Daily HOLLI Lion      Artificial Tears  1 drop Both Eyes Q3H PRN Jordan C Holter,       haloperidol lactate  2.5 mg Intramuscular Q4H PRN Max 4/day HOLLI Lion      And    LORazepam  1 mg Intramuscular Q4H PRN Max 4/day HOLLI Lion      And    benztropine  0.5 mg Intramuscular Q4H PRN Max 4/day HOLLI Lion      haloperidol lactate  5 mg Intramuscular Q4H PRN Max 4/day HOLLI Lion      And    LORazepam  2 mg Intramuscular Q4H PRN Max 4/day HOLLI Lion      And    benztropine  1 mg Intramuscular Q4H PRN Max 4/day HOLLI Lion      benztropine  0.5 mg Oral BID Jordan C Holter,       bisacodyl  10 mg Rectal Daily PRN HOLLI Lion      calcium carbonate  500 mg Oral BID PRN HOLLI Monge      cloZAPine  275 mg Oral HS Jordan C Holter, DO      hydrOXYzine HCL  50 mg Oral Q6H PRN Max 4/day Jordan C Holter,       Or    diphenhydrAMINE  50 mg Intramuscular Q6H PRN Jordan C Holter, DO      hydrOXYzine HCL  50 mg Oral Q6H PRN Max 4/day HOLLI Lion      Or    diphenhydrAMINE  50 mg Intramuscular Q6H PRN HOLLI Lion      diphenhydrAMINE-zinc acetate   Topical BID PRN  HOLLI Lion      docusate sodium  100 mg Oral BID Jourdan Dickens, DO      escitalopram  20 mg Oral Daily HOLLI Lion      famotidine  20 mg Oral BID PRN HOLLI Monge      fluticasone  1 spray Each Nare Daily PRN HOLLI Monge      haloperidol  1 mg Oral Q6H PRN HOLLI Lion      haloperidol  2.5 mg Oral Q4H PRN Max 4/day HOLLI Lion      haloperidol  5 mg Oral Q4H PRN Max 4/day HOLLI Lion      haloperidol  5 mg Oral BID Ion  Holter, DO      hydroCHLOROthiazide  12.5 mg Oral Daily HOLLI Galvan      hydrocortisone   Topical 4x Daily PRN HOLLI Lion      hydrOXYzine HCL  100 mg Oral Q6H PRN Max 4/day [facility] Holter, DO      Or    LORazepam  2 mg Intramuscular Q6H PRN [facility] Holter, DO      hydrOXYzine HCL  100 mg Oral Q6H PRN Max 4/day HOLLI Lion      Or    LORazepam  2 mg Intramuscular Q6H PRN HOLLI Lion      hydrOXYzine HCL  25 mg Oral Q6H PRN Max 4/day Ion  Holter, DO      ibuprofen  600 mg Oral Q8H PRN HOLLI Lion      influenza vaccine  0.5 mL Intramuscular Once Bora Rosario MD      loratadine  10 mg Oral Daily Brina Guillen MD      magnesium hydroxide  30 mL Oral Once Jourdan Dickens, DO      melatonin  3 mg Oral HS PRN Bora Rosario MD      melatonin  9 mg Oral HS Bora Rosario MD      methocarbamol  500 mg Oral Q6H PRN HOLLI Lion      multivitamin-minerals  1 tablet Oral Daily HOLLI Monge      nicotine polacrilex  2 mg Oral Q4H PRN Bora Rosario MD      OLANZapine  5 mg Oral Q4H PRN Max 3/day Ion  Holter, DO      Or    OLANZapine  2.5 mg Intramuscular Q4H PRN Max 3/day [facility] Holter, DO      OLANZapine  5 mg Oral Q3H PRN Max 3/day Ion  Holter, DO      Or    OLANZapine  5 mg Intramuscular Q3H PRN Max 3/day [facility] Holter, DO      OLANZapine  2.5 mg Oral Q4H PRN Max 6/day Ion C Holter, DO      ondansetron  4 mg Oral Q6H PRN HOLLI Lion       "pantoprazole  20 mg Oral QAM Jordan C Holter, DO      polyethylene glycol  17 g Oral Daily PRN HOLLI Ghotra      polyethylene glycol  17 g Oral Daily Jourdan Dickens, DO      propranolol  10 mg Oral Q12H KAYLIE HOLLI Lion      saliva substitute  5 spray Mouth/Throat 4x Daily PRN Mary Jo Reich PA-C      senna-docusate sodium  1 tablet Oral Daily PRN Jordan C Holter, DO      senna-docusate sodium  2 tablet Oral HS Jourdan Dickens, DO      traZODone  50 mg Oral HS PRN HOLLI Lion      white petrolatum-mineral oil   Topical TID PRN HOLLI Lion         Risks, benefits and possible side effects of Medications:   Risks, benefits, and possible side effects of medications explained to patient and patient verbalizes understanding and agreement for treatment.    Dual Antipsychotic Rationale: Patient requires dual antipsychotic treatment due to multiple failed medication trials and inability to control symptoms on a single agent.    Mental Status Evaluation:  Appearance:  Alert,age appropriate, casually dressed, adequate grooming, in no acute distress   Behavior:  pleasant, cooperative, calm   Speech:  normal rate and volume, fluent, clear, coherent   Mood:  \"So-So\"   Affect:  blunted, depressed   Thought Process:  organized, linear, normal rate of thoughts   Associations: concrete associations   Thought Content:  mild paranoia   Perceptual Disturbances: Continued auditory hallucinations that are fixed in nature of voices stating they will kill him/family. no visual hallucinations, does not appear responding to internal stimuli   Risk Potential: Suicidal ideation - Denies  Homicidal ideation - Denies  Potential for aggression - Not at present   Sensorium:  oriented to person, place, and time/date   Memory:  recent and remote memory grossly intact   Consciousness:  alert and awake   Attention/Concentration: attention span and concentration appear shorter than expected for age   Insight:  partial "   Judgment: limited   Gait/Station: in bed   Motor Activity: no abnormal movements     Recommended Treatment:   See above for assessment and plan.    Counseling/Coordination of Care    Total unit time spent today was greater than 15 minutes. Greater than 50% of total time was spent with the patient and/or patient's relatives and/or coordination of patient's care.     Patient's rights, confidentiality, exceptions to confidentiality, use of electronic medical record including appropriate staff access to medical record regarding behavioral health services and consent to treatment were reviewed.    Note Share:     This note was not shared with the patient due to reasonable likelihood of causing patient harm     Sharon Lilly, PA-S

## 2025-03-03 NOTE — PROGRESS NOTES
03/03/25 1326   Team Meeting   Meeting Type Tx Team Meeting   Initial Conference Date 03/03/25   Next Conference Date 04/02/25   Team Members Present   Team Members Present Physician;Nurse;   Physician Team Member Holter   Nursing Team Member Claudia   Social Work Team Member Jose J ORTEGA   Patient/Family Present   Patient Present Yes   Patient's Family Present No     Treatment team reviewed updated treatment plan with patient. Patient's in agreement with treatment plan.

## 2025-03-04 LAB
BASOPHILS # BLD AUTO: 0.06 THOUSANDS/ÂΜL (ref 0–0.1)
BASOPHILS NFR BLD AUTO: 1 % (ref 0–1)
BNP SERPL-MCNC: 44 PG/ML (ref 0–100)
CK SERPL-CCNC: 374 U/L (ref 39–308)
CRP SERPL QL: 7.2 MG/L
EOSINOPHIL # BLD AUTO: 0.33 THOUSAND/ÂΜL (ref 0–0.61)
EOSINOPHIL NFR BLD AUTO: 4 % (ref 0–6)
ERYTHROCYTE [DISTWIDTH] IN BLOOD BY AUTOMATED COUNT: 14.1 % (ref 11.6–15.1)
HCT VFR BLD AUTO: 42.9 % (ref 36.5–49.3)
HGB BLD-MCNC: 12.6 G/DL (ref 12–17)
IMM GRANULOCYTES # BLD AUTO: 0.03 THOUSAND/UL (ref 0–0.2)
IMM GRANULOCYTES NFR BLD AUTO: 0 % (ref 0–2)
LYMPHOCYTES # BLD AUTO: 3.8 THOUSANDS/ÂΜL (ref 0.6–4.47)
LYMPHOCYTES NFR BLD AUTO: 44 % (ref 14–44)
MCH RBC QN AUTO: 23.1 PG (ref 26.8–34.3)
MCHC RBC AUTO-ENTMCNC: 29.4 G/DL (ref 31.4–37.4)
MCV RBC AUTO: 79 FL (ref 82–98)
MONOCYTES # BLD AUTO: 0.75 THOUSAND/ÂΜL (ref 0.17–1.22)
MONOCYTES NFR BLD AUTO: 9 % (ref 4–12)
NEUTROPHILS # BLD AUTO: 3.56 THOUSANDS/ÂΜL (ref 1.85–7.62)
NEUTS SEG NFR BLD AUTO: 42 % (ref 43–75)
NRBC BLD AUTO-RTO: 0 /100 WBCS
PLATELET # BLD AUTO: 246 THOUSANDS/UL (ref 149–390)
PMV BLD AUTO: 10.4 FL (ref 8.9–12.7)
RBC # BLD AUTO: 5.45 MILLION/UL (ref 3.88–5.62)
WBC # BLD AUTO: 8.53 THOUSAND/UL (ref 4.31–10.16)

## 2025-03-04 PROCEDURE — 83880 ASSAY OF NATRIURETIC PEPTIDE: CPT | Performed by: PSYCHIATRY & NEUROLOGY

## 2025-03-04 PROCEDURE — 99232 SBSQ HOSP IP/OBS MODERATE 35: CPT | Performed by: PSYCHIATRY & NEUROLOGY

## 2025-03-04 PROCEDURE — 85025 COMPLETE CBC W/AUTO DIFF WBC: CPT | Performed by: PSYCHIATRY & NEUROLOGY

## 2025-03-04 PROCEDURE — 82550 ASSAY OF CK (CPK): CPT | Performed by: PSYCHIATRY & NEUROLOGY

## 2025-03-04 PROCEDURE — 86140 C-REACTIVE PROTEIN: CPT | Performed by: PSYCHIATRY & NEUROLOGY

## 2025-03-04 RX ADMIN — MULTIPLE VITAMINS W/ MINERALS TAB 1 TABLET: TAB ORAL at 08:51

## 2025-03-04 RX ADMIN — HALOPERIDOL 5 MG: 5 TABLET ORAL at 17:11

## 2025-03-04 RX ADMIN — BENZTROPINE MESYLATE 0.5 MG: 0.5 TABLET ORAL at 17:11

## 2025-03-04 RX ADMIN — DOCUSATE SODIUM 100 MG: 100 CAPSULE, LIQUID FILLED ORAL at 08:49

## 2025-03-04 RX ADMIN — PROPRANOLOL HYDROCHLORIDE 10 MG: 10 TABLET ORAL at 08:50

## 2025-03-04 RX ADMIN — NICOTINE POLACRILEX 2 MG: 2 GUM, CHEWING ORAL at 17:11

## 2025-03-04 RX ADMIN — PROPRANOLOL HYDROCHLORIDE 10 MG: 10 TABLET ORAL at 21:22

## 2025-03-04 RX ADMIN — DOCUSATE SODIUM 100 MG: 100 CAPSULE, LIQUID FILLED ORAL at 17:11

## 2025-03-04 RX ADMIN — MELATONIN TAB 3 MG 9 MG: 3 TAB at 21:23

## 2025-03-04 RX ADMIN — NICOTINE POLACRILEX 2 MG: 2 GUM, CHEWING ORAL at 12:43

## 2025-03-04 RX ADMIN — CLOZAPINE 275 MG: 25 TABLET ORAL at 21:23

## 2025-03-04 RX ADMIN — NICOTINE POLACRILEX 2 MG: 2 GUM, CHEWING ORAL at 08:51

## 2025-03-04 RX ADMIN — PANTOPRAZOLE SODIUM 20 MG: 20 TABLET, DELAYED RELEASE ORAL at 08:49

## 2025-03-04 RX ADMIN — BENZTROPINE MESYLATE 0.5 MG: 0.5 TABLET ORAL at 08:49

## 2025-03-04 RX ADMIN — SENNOSIDES AND DOCUSATE SODIUM 2 TABLET: 50; 8.6 TABLET ORAL at 21:23

## 2025-03-04 RX ADMIN — NICOTINE POLACRILEX 2 MG: 2 GUM, CHEWING ORAL at 21:22

## 2025-03-04 RX ADMIN — HALOPERIDOL 5 MG: 5 TABLET ORAL at 08:49

## 2025-03-04 RX ADMIN — HYDROCHLOROTHIAZIDE 12.5 MG: 12.5 TABLET ORAL at 08:50

## 2025-03-04 RX ADMIN — LORATADINE 10 MG: 10 TABLET ORAL at 08:49

## 2025-03-04 RX ADMIN — ESCITALOPRAM OXALATE 20 MG: 10 TABLET, FILM COATED ORAL at 08:51

## 2025-03-04 NOTE — ASSESSMENT & PLAN NOTE
Evaluated 3/4/2025.  Continues with residual auditory hallucinations. Reports continued depression.  Continue psychotropic medications including:   Continue Clozapine 275 mg nightly to confer mood stability in addition to addressing psychosis.   03/04: , BNP 44, CRP 7.2, ANC 4.08  CBC w/diff and CK every 2 weeks  Continue Cogentin 0.5 mg twice daily for EPS signs/symptoms.   Continue Haldol 5 mg twice daily to address psychosis   Continue Lexapro 20 mg daily to address depression and anxiety  Continue Propanolol 10 mg twice daily to address anxiety   Continue Melatonin 9 mg to address insomnia  On 2 antipsychotics due to subtherapeutic effects with monotherapy  Continue discharge planning: Patient pending ACT team linkage for discharge to his aunt's residence once MA funding comes through from the [facility].

## 2025-03-04 NOTE — PLAN OF CARE
Problem: Alteration in Thoughts and Perception  Goal: Refrain from acting on delusional thinking/internal stimuli  Description: Interventions:  - Monitor patient closely, per order   - Utilize least restrictive measures   - Set reasonable limits, give positive feedback for acceptable   - Administer medications as ordered and monitor of potential side effects  Outcome: Progressing  Goal: Agree to be compliant with medication regime, as prescribed and report medication side effects  Description: Interventions:  - Offer appropriate PRN medication and supervise ingestion; conduct AIMS, as needed   Outcome: Progressing  Goal: Recognize dysfunctional thoughts, communicate reality-based thoughts at the time of discharge  Description: Interventions:  - Provide medication and psycho-education to assist patient in compliance and developing insight into his/her illness   Outcome: Progressing     Problem: Ineffective Coping  Goal: Demonstrates healthy coping skills  Outcome: Progressing     Problem: Depression  Goal: Verbalize thoughts and feelings  Description: Interventions:  - Assess and re-assess patient's level of risk   - Engage patient in 1:1 interactions, daily, for a minimum of 15 minutes   - Encourage patient to express feelings, fears, frustrations, hopes   Outcome: Progressing  Goal: Refrain from harming self  Description: Interventions:  - Monitor patient closely, per order   - Supervise medication ingestion, monitor effects and side effects   Outcome: Progressing  Goal: Refrain from isolation  Description: Interventions:  - Develop a trusting relationship   - Encourage socialization   Outcome: Progressing  Goal: Refrain from self-neglect  Outcome: Progressing     Problem: Anxiety  Goal: Anxiety is at manageable level  Description: Interventions:  - Assess and monitor patient's anxiety level.   - Monitor for signs and symptoms (heart palpitations, chest pain, shortness of breath, headaches, nausea, feeling jumpy,  restlessness, irritable, apprehensive).   - Collaborate with interdisciplinary team and initiate plan and interventions as ordered.  - [place] patient to unit/surroundings  - Explain treatment plan  - Encourage participation in care  - Encourage verbalization of concerns/fears  - Identify coping mechanisms  - Assist in developing anxiety-reducing skills  - Administer/offer alternative therapies  - Limit or eliminate stimulants  Outcome: Progressing     Problem: Alteration in Orientation  Goal: Treatment Goal: Demonstrate a reduction of confusion and improved orientation to person, place, time and/or situation upon discharge, according to optimum baseline/ability  Outcome: Progressing  Goal: Interact with staff daily  Description: Interventions:  - Assess and re-assess patient's level of orientation  - Engage patient in 1 on 1 interactions, daily, for a minimum of 15 minutes   - Establish rapport/trust with patient   Outcome: Progressing     Problem: Alteration in Thoughts and Perception  Goal: Treatment Goal: Gain control of psychotic behaviors/thinking, reduce/eliminate presenting symptoms and demonstrate improved reality functioning upon discharge  Outcome: Not Progressing

## 2025-03-04 NOTE — PLAN OF CARE
Problem: Ineffective Coping  Goal: Identifies ineffective coping skills  Outcome: Progressing  Goal: Identifies healthy coping skills  Outcome: Progressing  Goal: Demonstrates healthy coping skills  Outcome: Progressing   He continues to attend and interact in groups activities.

## 2025-03-04 NOTE — ASSESSMENT & PLAN NOTE
Evaluated 3/4/2025, continue nicotine gum and continue supportive therapy including promotion of smoking cessation    No associated orders from this encounter found during lookback period of 72 hours.

## 2025-03-04 NOTE — PHYSICAL THERAPY NOTE
Physical Therapy Evaluation    Patient's Name: Alberto Berumen    Admitting Diagnosis  Major depressive disorder, single episode, unspecified [F32.9]  Auditory hallucinations [R44.0]    Problem List  Patient Active Problem List   Diagnosis    GERD (gastroesophageal reflux disease)    Schizoaffective disorder, bipolar type (HCC)    Tobacco abuse    T wave inversion in EKG    Syringoma    Chronic idiopathic constipation    Vitamin B 12 deficiency    Vitamin D deficiency    Confluent and reticulate papillomatosis    Class 2 obesity in adult    Primary hypertension    Elevated hemoglobin A1c    Bilateral lower extremity edema       Past Medical History  Past Medical History:   Diagnosis Date    Anemia     Chronic idiopathic constipation     GERD (gastroesophageal reflux disease)     Schizoaffective disorder (HCC)     Syringoma     T wave inversion in EKG        Past Surgical History  Past Surgical History:   Procedure Laterality Date    COLONOSCOPY         Recent Imaging  No orders to display       Recent Vital Signs  Vitals:    03/03/25 0753 03/03/25 1528 03/03/25 2010 03/04/25 0749   BP: 100/51 136/75 140/81 117/78   BP Location: Right arm Right arm  Left arm   Pulse: 64 88 88 84   Resp: 16 18  17   Temp: 97.6 °F (36.4 °C) 97.9 °F (36.6 °C)  97.5 °F (36.4 °C)   TempSrc: Temporal Temporal  Temporal   SpO2: 100% 98%  97%   Weight:       Height:            02/28/25 1530   PT Last Visit   PT Visit Date 02/28/25   Note Type   Note type Evaluation   Pain Assessment   Pain Assessment Tool 0-10   Pain Score 2   Pain Location/Orientation   (thumb)   RUE Assessment   RUE Assessment   (5/5  strength)   LUE Assessment   LUE Assessment   (4/5  strength)   Coordination   Movements are Fluid and Coordinated 1   Sensation X  (reports waking up at night with altered sensation throughout body at times)   Light Touch   RLE Light Touch Grossly intact   LLE Light Touch Grossly intact   Bed Mobility   Supine to Sit 7  Independent    Sit to Supine 7  Independent   Transfers   Sit to Stand 7  Independent   Stand to Sit 7  Independent   Ambulation/Elevation   Gait pattern WNL   Gait Assistance 7  Independent   Balance   Static Sitting Good   Dynamic Sitting Good   Static Standing Fair +   Dynamic Standing Fair +   Ambulatory Fair +   Endurance Deficit   Endurance Deficit No   Activity Tolerance   Activity Tolerance Patient tolerated treatment well   Assessment   Prognosis Good   Problem List Decreased strength;Decreased range of motion;Pain;Impaired sensation;Impaired judgement;Decreased safety awareness   Barriers to Discharge Inaccessible home environment;Decreased caregiver support   Goals   Patient Goals to have less thumb hand pain   Plan   Treatment/Interventions   (will initiate HEP for hand and thumb strengthening for injury prevention and reduced pain)   PT Frequency   (HEP will be provided monday with 1 week f/u after that)   Discharge Recommendation   Rehab Resource Intensity Level, PT No post-acute rehabilitation needs   AM-PAC Basic Mobility Inpatient   Turning in Flat Bed Without Bedrails 4   Lying on Back to Sitting on Edge of Flat Bed Without Bedrails 4   Moving Bed to Chair 4   Standing Up From Chair Using Arms 4   Walk in Room 4   Climb 3-5 Stairs With Railing 4   Basic Mobility Inpatient Raw Score 24   Basic Mobility Standardized Score 57.68   [facility] Highest Level Of Mobility   -HLM Goal 8: Walk 250 feet or more   -HLM Achieved 8: Walk 250 feet ot more   End of Consult   Patient Position at End of Consult Seated edge of bed;All needs within reach           ASSESSMENT                                                                                                                     Alberto Berumen is a 29 y.o. male admitted to [facility] on 3/29/2024 for Schizoaffective disorder, bipolar type (HCC). Pt  has a past medical history of Anemia, Chronic idiopathic constipation, GERD (gastroesophageal reflux disease),  Schizoaffective disorder (HCC), Syringoma, and T wave inversion in EKG.. PT was consulted and pt was seen on 3/4/2025 for mobility assessment and d/c planning.   Pt presents standing in hallway alert and agreeable to therapy. Reporting thumb and hand pain on the L side, improved since onset a few days ago. Staff reports pt does use punching bag for exercise and may be related to this. He does have mild  strength deficit on the L vs R hand, minor TTP at the head of the MCP joint, mild soreness with DeQuervain sign and with end range ext of the MCP joint. No swelling noted at this time. Could be mild sprain of MCP joint due to punching bag use vs normal day to day use. Will provide HEP for pt to improve  strength with focus on thumb specifically and for strengthening into abd and ext as well. Unless sx off pain persist would not limit pts activity at this time as they appear to be improving.    Impairments limiting pt at this time include decreased ROM, pain, decreased safety awareness, impaired judgement, decreased sensation, and decreased strength. Pt is currently functioning at a independent level for bed mobility, independent level for transfers, independent level for ambulation with no assistive device, and independent for elevations. The patient's AM-[facility] Basic Mobility Inpatient Short Form Raw Score is 24. A Raw score of greater than 16 suggests the patient may benefit from discharge to home. Please also refer to the recommendation of the Physical Therapist for safe discharge planning.    Recommendations                                                                                                              Will provide HEP next visit and complete 1 additional f/u visit 1 week after to assess effectiveness/appropriateness.    DME: None    Discharge Disposition:   No further inpt PT needs      Pavan Rivera PT, DPT

## 2025-03-04 NOTE — ASSESSMENT & PLAN NOTE
Evaluated 3/4/2025 most recent weight 242 pounds on March 2, 2025, continue dietary management  No associated orders from this encounter found during lookback period of 72 hours.

## 2025-03-04 NOTE — ASSESSMENT & PLAN NOTE
Evaluated 3/4/25, continue nicotine gum and continue supportive therapy including promotion of smoking cessation

## 2025-03-04 NOTE — NURSING NOTE
Patient requested prn Nicorette gum at this time. Patient visible all evening. Asked to be first for scheduled HS medications. Behaviors controlled and appropriate. Cooperative and medication compliant. Offers no complaints. Pleasant with staff and peers. Uneventful shift.

## 2025-03-04 NOTE — ASSESSMENT & PLAN NOTE
Evaluated 3/4/25, continue Senokot 2 tablets at bedtime, MiraLAX 17 g daily, and Colace twice daily

## 2025-03-04 NOTE — PROGRESS NOTES
Progress Note - Behavioral Health   Name: Alberto Berumen 29 y.o. male I MRN: [id number]  Unit/Bed#: [facility] 112-02 I Date of Admission: 3/29/2024   Date of Service: 3/4/2025 I Hospital Day: 340     Assessment & Plan  Schizoaffective disorder, bipolar type (HCC)  Evaluated 3/4/2025.  Continues with residual auditory hallucinations. Reports continued depression.  Continue psychotropic medications including:   Continue Clozapine 275 mg nightly to confer mood stability in addition to addressing psychosis.   03/04: , BNP 44, CRP 7.2, ANC 4.08  CBC w/diff and CK every 2 weeks  Continue Cogentin 0.5 mg twice daily for EPS signs/symptoms.   Continue Haldol 5 mg twice daily to address psychosis   Continue Lexapro 20 mg daily to address depression and anxiety  Continue Propanolol 10 mg twice daily to address anxiety   Continue Melatonin 9 mg to address insomnia  On 2 antipsychotics due to subtherapeutic effects with monotherapy  Continue discharge planning: Patient pending ACT team linkage for discharge to his aunt's residence once MA funding comes through from the [facility].    Chronic idiopathic constipation  Evaluated 3/4/25, continue Senokot 2 tablets at bedtime, MiraLAX 17 g daily, and Colace twice daily    Tobacco abuse  Evaluated 3/4/25, continue nicotine gum and continue supportive therapy including promotion of smoking cessation      Class 2 obesity in adult  Evaluated 3/4/25  most recent weight 242 pounds on March 2, 2025, continue dietary management      Primary hypertension  Evaluated 3/4/25, continue Norvasc 5 mg daily and hydrochlorothiazide 12.5 mg daily    GERD (gastroesophageal reflux disease)  Continue medical management    Elevated hemoglobin A1c  Continue medical management      Plan:  Continue prescribed psychotropic medication regimen  Currently on 201 commitment status  Continue to promote patient participation in therapeutic milieu.  Continue medical management per medicine.  Continue  "behavioral health checks q.15 minutes.   Continue vitals per behavioral health unit protocol.  Safety: Safety and communication plan established to target dynamic risk factors discussed above.   Discharge and disposition: Following coordination with ACT services, discharge to aunt's home.       SUBJECTIVE     Patient evaluated this a.m. for continuity of care. Nursing reports no acute behaviors. Today, Alberto feels \"so-so.\" Denies increase in anxiety or depression. He reports continued auditory hallucinations of voices stating \"they will kill me if I leave here.\" Denies change in nature of the voices, command hallucinations, and visual hallucinations. Denies self-injurious thoughts/behaviors, suicidal ideation, and homicidal ideation at this time.     Patient reports continued joint paint in his left hand and body aches. PT saw patient yesterday and gave him putty for hand exercises, also encouraged patient to not hit the punching bag as hard. Patient reports appetite is unchanged. Nursing reports he ate 75% of lunch and 100% of dinner yesterday. Patient met with dietitian to discuss possible supplementation of dietary needs. Reports last BM yesterday without difficulty. Denies sleep difficulties. Medication compliant.       PSYCHIATRIC REVIEW OF SYSTEMS     Behavior over the last 24 hours: unchanged  Sleep: normal  Appetite: normal  Medication side effects: No    Progress Toward Goals: Limited progress, as evidenced by their participation (or lack thereof) in individual, social and therapeutic milieu in addition to adherence to their medication regimen.  Patient Acceptance: Fair  Patient Understanding: Limited  Patient Involvement in Reintegration Process: Remains in contact with sisters and aunt via the phone.   Patient's Therapeutic Relationship with Psychiatrist: Trusting  Patient's Optimism Regarding Recovery: Following coordination with ACT services, discharge to aunt's home.   Group Attendance: " 9/10    REVIEW OF SYSTEMS   Review of systems: Joint pain (knees, ankles, fingers) and body aches.     OBJECTIVE     Vital Signs in Past 24 Hours:  Temp:  [97.5 °F (36.4 °C)-97.9 °F (36.6 °C)] 97.5 °F (36.4 °C)  HR:  [84-88] 84  BP: (117-140)/(75-81) 117/78  Resp:  [17-18] 17  SpO2:  [97 %-98 %] 97 %  O2 Device: None (Room air)    Intake/Output in Past 24 hours:  No intake/output data recorded.  No intake/output data recorded.        Laboratory Results:  I have personally reviewed all pertinent laboratory/tests results.  Most Recent Labs:   Lab Results   Component Value Date    WBC 8.53 03/04/2025    RBC 5.45 03/04/2025    HGB 12.6 03/04/2025    HCT 42.9 03/04/2025     03/04/2025    RDW 14.1 03/04/2025    NEUTROABS 3.56 03/04/2025    SODIUM 140 10/16/2024    K 3.8 10/16/2024     10/16/2024    CO2 29 10/16/2024    BUN 9 10/16/2024    CREATININE 0.91 10/16/2024    GLUC 94 10/16/2024    GLUF 94 10/16/2024    CALCIUM 9.5 10/16/2024    AST 26 09/30/2024    ALT 42 09/30/2024    ALKPHOS 64 09/30/2024    TP 6.6 09/30/2024    ALB 3.8 09/30/2024    TBILI 0.47 09/30/2024    CHOLESTEROL 156 03/14/2024    HDL 44 03/14/2024    TRIG 133 03/14/2024    LDLCALC 85 03/14/2024    NONHDLC 112 03/14/2024    LITHIUM 0.61 01/09/2024    DKH6LOOUOFSA 1.062 11/15/2023    HGBA1C 5.0 04/01/2024    EAG 97 04/01/2024       Behavioral Health Medications: all current active meds have been reviewed.    Current Facility-Administered Medications   Medication Dose Route Frequency Provider Last Rate    acetaminophen  650 mg Oral Q4H PRN Jordan C Holter, DO      acetaminophen  650 mg Oral Q6H PRN HOLLI Lion      aluminum-magnesium hydroxide-simethicone  30 mL Oral Q4H PRN Jordan C Holter,       amLODIPine  5 mg Oral Daily HOLLI Lion      Artificial Tears  1 drop Both Eyes Q3H PRN Jordan C Holter, DO      haloperidol lactate  2.5 mg Intramuscular Q4H PRN Max 4/day HOLLI Lion      And    LORazepam  1 mg Intramuscular  Q4H PRN Max 4/day HOLLI Lion      And    benztropine  0.5 mg Intramuscular Q4H PRN Max 4/day HOLLI Lion      haloperidol lactate  5 mg Intramuscular Q4H PRN Max 4/day HOLLI Lion      And    LORazepam  2 mg Intramuscular Q4H PRN Max 4/day HOLLI Lion      And    benztropine  1 mg Intramuscular Q4H PRN Max 4/day HOLLI Lion      benztropine  0.5 mg Oral BID [facility] Holter, DO      bisacodyl  10 mg Rectal Daily PRN HOLLI Lion      calcium carbonate  500 mg Oral BID PRN HOLLI Monge      cloZAPine  275 mg Oral HS [facility] Cresencioter, DO      hydrOXYzine HCL  50 mg Oral Q6H PRN Max 4/day [facility] Holter, DO      Or    diphenhydrAMINE  50 mg Intramuscular Q6H PRN [facility] Holter, DO      hydrOXYzine HCL  50 mg Oral Q6H PRN Max 4/day HOLLI Lion      Or    diphenhydrAMINE  50 mg Intramuscular Q6H PRN HOLLI Lion      diphenhydrAMINE-zinc acetate   Topical BID PRN HOLLI Lion      docusate sodium  100 mg Oral BID Jourdan Dickens, DO      escitalopram  20 mg Oral Daily HOLLI Lion      famotidine  20 mg Oral BID PRN HOLLI Monge      fluticasone  1 spray Each Nare Daily PRN HOLLI Monge      haloperidol  1 mg Oral Q6H PRN HOLLI Lion      haloperidol  2.5 mg Oral Q4H PRN Max 4/day HOLLI Lion      haloperidol  5 mg Oral Q4H PRN Max 4/day HOLLI Lion      haloperidol  5 mg Oral BID Ion  Holter, DO      hydroCHLOROthiazide  12.5 mg Oral Daily Pia Mantilla, HOLLI      hydrocortisone   Topical 4x Daily PRN HOLLI Lion      hydrOXYzine HCL  100 mg Oral Q6H PRN Max 4/day [facility] Holter, DO      Or    LORazepam  2 mg Intramuscular Q6H PRN [facility] Holter, DO      hydrOXYzine HCL  100 mg Oral Q6H PRN Max 4/day HOLLI Lion      Or    LORazepam  2 mg Intramuscular Q6H PRN HOLLI Lion      hydrOXYzine HCL  25 mg Oral Q6H PRN Max 4/day Jordan C Holter, DO       ibuprofen  600 mg Oral Q8H PRN HOLLI Lion      influenza vaccine  0.5 mL Intramuscular Once Bora Rosario MD      loratadine  10 mg Oral Daily Brina Guillen MD      magnesium hydroxide  30 mL Oral Once Jourdan Dickens, DO      melatonin  3 mg Oral HS PRN Bora Rosario MD      melatonin  9 mg Oral HS Bora Rosario MD      methocarbamol  500 mg Oral Q6H PRN HOLLI Lion      multivitamin-minerals  1 tablet Oral Daily Eileen Holliday Mikey, HOLLI      nicotine polacrilex  2 mg Oral Q4H PRN Bora Rosario MD      OLANZapine  5 mg Oral Q4H PRN Max 3/day [facility] Holter, DO      Or    OLANZapine  2.5 mg Intramuscular Q4H PRN Max 3/day [facility] Holter, DO      OLANZapine  5 mg Oral Q3H PRN Max 3/day [facility] Holter, DO      Or    OLANZapine  5 mg Intramuscular Q3H PRN Max 3/day [facility] Holter, DO      OLANZapine  2.5 mg Oral Q4H PRN Max 6/day [facility] Holter, DO      ondansetron  4 mg Oral Q6H PRN HOLLI Lion      [facility]antoprazole  [facility]      polyethylene glycol  17 g Oral Daily PRN Sofi Yoo, HOLLI      polyethylene glycol  17 g Oral Daily Jourdan Dickens, DO      propranolol  10 mg Oral Q12H KAYLIE HOLLI Lion      saliva substitute  5 spray Mouth/Throat 4x Daily PRN Mary Jo Reich PA-C      senna-docusate sodium  1 tablet Oral Daily PRN [facility] Holter, DO      senna-docusate sodium  2 tablet Oral HS Jourdan Dickens, DO      traZODone  50 mg Oral HS PRN HOLLI Lion      white petrolatum-mineral oil   Topical TID PRN HOLLI Lion         Risks, benefits and possible side effects of Medications:   Risks, benefits, and possible side effects of medications explained to patient and patient verbalizes understanding and agreement for treatment.    Dual Antipsychotic Rationale: Patient requires dual antipsychotic treatment due to multiple failed medication trials and inability to control symptoms on a single agent.     Mental Status Evaluation:  Appearance:   "age appropriate, casually dressed, adequate grooming, in no acute distress   Behavior:  pleasant, cooperative, calm   Speech:  normal rate, normal volume, fluent, clear, coherent   Mood:  \"So-so\"   Affect:  blunted, depressed   Thought Process:  organized, linear, normal rate of thoughts   Associations: concrete associations   Thought Content:  mild paranoia   Perceptual Disturbances: no visual hallucinations, auditory hallucinations of voices stating they will kill him, does not appear responding to internal stimuli   Risk Potential: Suicidal ideation - None  Homicidal ideation - None  Potential for aggression - Not at present   Sensorium:  oriented to person, place, and time/date   Memory:  recent and remote memory grossly intact   Consciousness:  alert and awake   Attention/Concentration: attention span and concentration appear shorter than expected for age   Insight:  partial   Judgment: limited   Gait/Station: in bed   Motor Activity: no abnormal movements     Recommended Treatment:   See above for assessment and plan.    Counseling/Coordination of Care    Total unit time spent today was greater than 15 minutes. Greater than 50% of total time was spent with the patient and/or patient's relatives and/or coordination of patient's care.     Patient's rights, confidentiality, exceptions to confidentiality, use of electronic medical record including appropriate staff access to medical record regarding behavioral health services and consent to treatment were reviewed.    Note Share:     This note was not shared with the patient due to reasonable likelihood of causing patient harm     Brittney Heck   Psychiatry, MS-3  "

## 2025-03-04 NOTE — ASSESSMENT & PLAN NOTE
Evaluated 3/4/2025.  Continues with residual auditory hallucinations. Reports continued depression.  Continue psychotropic medications including:   Continue Clozapine 275 mg nightly to confer mood stability in addition to addressing psychosis.   02/18: , BNP 25, CRP 10, ANC 4.08  CBC w/diff and CK every 2 weeks  Continue Cogentin 0.5 mg twice daily for EPS signs/symptoms.   Continue Haldol 5 mg twice daily to address psychosis   Continue Lexapro 20 mg daily to address depression and anxiety  Continue Propanolol 10 mg twice daily to address anxiety   Continue Melatonin 9 mg to address insomnia  On 2 antipsychotics due to subtherapeutic effects with monotherapy  Continue discharge planning: Patient pending ACT team linkage for discharge to his aunt's residence once MA funding comes through from the [facility].  No associated orders from this encounter found during lookback period of 72 hours.

## 2025-03-04 NOTE — PROGRESS NOTES
03/04/25 1038   Team Meeting   Meeting Type Daily Rounds   Team Members Present   Team Members Present Physician;Nurse;;Other (Discipline and Name)   Physician Team Member Holter   Nursing Team Member Claudia   Social Work Team Member Jacob BROOKS   Other (Discipline and Name) ALMAS Wang & Jeremias   Patient/Family Present   Patient Present No   Patient's Family Present No     Group Participation: 9/10  Seen by physical therapist yesterday. Cooperative. Engaged in treatment. [facility] may have ACT opening in April or May.

## 2025-03-04 NOTE — ASSESSMENT & PLAN NOTE
Evaluated 3/4/2025, continue Senokot 2 tablets at bedtime, MiraLAX 17 g daily, and Colace twice daily  No associated orders from this encounter found during lookback period of 72 hours.

## 2025-03-04 NOTE — NURSING NOTE
"Patient is out and up early today,still no breakfast but good appetite for lunch with 100%. Pt states, I got my chic pea burger for lunch and it was really good, I hope I get the same kind for dinner.\" Pt reports no s/s, takes all medications without issue.   "

## 2025-03-05 PROCEDURE — 99232 SBSQ HOSP IP/OBS MODERATE 35: CPT | Performed by: PSYCHIATRY & NEUROLOGY

## 2025-03-05 RX ADMIN — HALOPERIDOL 5 MG: 5 TABLET ORAL at 17:01

## 2025-03-05 RX ADMIN — BENZTROPINE MESYLATE 0.5 MG: 0.5 TABLET ORAL at 17:01

## 2025-03-05 RX ADMIN — PROPRANOLOL HYDROCHLORIDE 10 MG: 10 TABLET ORAL at 08:58

## 2025-03-05 RX ADMIN — HALOPERIDOL 5 MG: 5 TABLET ORAL at 08:58

## 2025-03-05 RX ADMIN — PANTOPRAZOLE SODIUM 20 MG: 20 TABLET, DELAYED RELEASE ORAL at 08:58

## 2025-03-05 RX ADMIN — ESCITALOPRAM OXALATE 20 MG: 10 TABLET, FILM COATED ORAL at 08:58

## 2025-03-05 RX ADMIN — HYDROCHLOROTHIAZIDE 12.5 MG: 12.5 TABLET ORAL at 08:58

## 2025-03-05 RX ADMIN — SENNOSIDES AND DOCUSATE SODIUM 2 TABLET: 50; 8.6 TABLET ORAL at 21:21

## 2025-03-05 RX ADMIN — MULTIPLE VITAMINS W/ MINERALS TAB 1 TABLET: TAB ORAL at 08:59

## 2025-03-05 RX ADMIN — AMLODIPINE BESYLATE 5 MG: 5 TABLET ORAL at 08:58

## 2025-03-05 RX ADMIN — LORATADINE 10 MG: 10 TABLET ORAL at 08:58

## 2025-03-05 RX ADMIN — MELATONIN TAB 3 MG 9 MG: 3 TAB at 21:21

## 2025-03-05 RX ADMIN — NICOTINE POLACRILEX 2 MG: 2 GUM, CHEWING ORAL at 21:21

## 2025-03-05 RX ADMIN — CLOZAPINE 275 MG: 25 TABLET ORAL at 21:21

## 2025-03-05 RX ADMIN — NICOTINE POLACRILEX 2 MG: 2 GUM, CHEWING ORAL at 08:59

## 2025-03-05 RX ADMIN — DOCUSATE SODIUM 100 MG: 100 CAPSULE, LIQUID FILLED ORAL at 17:01

## 2025-03-05 RX ADMIN — PROPRANOLOL HYDROCHLORIDE 10 MG: 10 TABLET ORAL at 21:20

## 2025-03-05 RX ADMIN — NICOTINE POLACRILEX 2 MG: 2 GUM, CHEWING ORAL at 13:00

## 2025-03-05 RX ADMIN — NICOTINE POLACRILEX 2 MG: 2 GUM, CHEWING ORAL at 17:01

## 2025-03-05 RX ADMIN — BENZTROPINE MESYLATE 0.5 MG: 0.5 TABLET ORAL at 08:58

## 2025-03-05 RX ADMIN — DOCUSATE SODIUM 100 MG: 100 CAPSULE, LIQUID FILLED ORAL at 08:59

## 2025-03-05 NOTE — SOCIAL WORK
This writer called [facility] ([phone]) and spoke with Ms Palma regarding patient's MA application. She reports they are waiting for his hospital bill after Medicare has paid their portion. This writer explained that Medicare does not pay for his hospital bill. She has submitted a tciket to the patient's assigned  CM, requesting they contact this writer to discuss further. Ticket number [phone]

## 2025-03-05 NOTE — PROGRESS NOTES
Progress Note - Behavioral Health   Name: Alberto Berumen 29 y.o. male I MRN: [id number]  Unit/Bed#: [facility] 112-02 I Date of Admission: 3/29/2024   Date of Service: 3/5/2025 I Hospital Day: 341     Assessment & Plan  Schizoaffective disorder, bipolar type (HCC)  Evaluated 3/5/2025.  Continues with residual auditory hallucinations. Reports continued depression.  Continue psychotropic medications including:   Continue Clozapine 275 mg nightly to confer mood stability in addition to addressing psychosis.   03/04: , BNP 44, CRP 7.2, ANC 4.08  CBC w/diff and CK every 2 weeks  Continue Cogentin 0.5 mg twice daily for EPS signs/symptoms.   Continue Haldol 5 mg twice daily to address psychosis   Continue Lexapro 20 mg daily to address depression and anxiety  Continue Propanolol 10 mg twice daily to address anxiety   Continue Melatonin 9 mg to address insomnia  On 2 antipsychotics due to subtherapeutic effects with monotherapy  Continue discharge planning: Patient pending ACT team linkage for discharge to his aunt's residence once MA funding comes through from the [facility].    Chronic idiopathic constipation  Evaluated 3/5/25, continue Senokot 2 tablets at bedtime, MiraLAX 17 g daily, and Colace twice daily    Tobacco abuse  Evaluated 3/5/25, continue nicotine gum and continue supportive therapy including promotion of smoking cessation      Class 2 obesity in adult  Evaluated 3/5/25  most recent weight 242 pounds on March 2, 2025, continue dietary management      Primary hypertension  Evaluated 3/5/25, continue Norvasc 5 mg daily and hydrochlorothiazide 12.5 mg daily    GERD (gastroesophageal reflux disease)  Continue medical management    Elevated hemoglobin A1c  Continue medical management      Plan:  Continue prescribed psychotropic medication regimen  Currently on 201 commitment status  Continue to promote patient participation in therapeutic milieu.  Continue medical management per medicine.  Continue  "behavioral health checks q.15 minutes.   Continue vitals per behavioral health unit protocol.  Safety: Safety and communication plan established to target dynamic risk factors discussed above.   Discharge and disposition: Following coordination with ACT services, discharge to aunt's home.     SUBJECTIVE     Patient evaluated this a.m. for continuity of care. Nursing reports no acute behaviors. Today, Alberto reports feeling \"stable.\" Denies increased in anxiety or depression.  He reports continued auditory hallucinations of voices stating they will kill him if he leaves here. Denies change in nature of the voices, command hallucinations, and visual hallucinations. Denies self-injurious thoughts/behaviors, suicidal ideation, and homicidal ideation at this time.     Patient reports his left hand pain and muscle aches are improved compared to yesterday. He was encouraged to drink more water as his CK levels were slightly elevated yesterday. Patient reports good appetite. Nursing reports he ate 100% of lunch and 100% of dinner yesterday.  He denies constipation, last BM was yesterday. He denies sleep difficulties. He is medication compliant.       PSYCHIATRIC REVIEW OF SYSTEMS     Behavior over the last 24 hours:  unchanged  Sleep: normal  Appetite: normal  Medication side effects: No    Progress Toward Goals: Limited progress, as evidenced by their participation (or lack thereof) in individual, social and therapeutic milieu in addition to adherence to their medication regimen.  Patient Acceptance: Fair  Patient Understanding: Limited   Patient Involvement in Reintegration Process:  Remains in contact with sisters and aunt via the phone.   Patient's Therapeutic Relationship with Psychiatrist: Trusting   Patient's Optimism Regarding Recovery: Following coordination with ACT services, discharge to aunt's home.   Group Attendance: 8/9    REVIEW OF SYSTEMS   Review of systems: no complaints    OBJECTIVE     Vital Signs in " Past 24 Hours:  Temp:  [97.5 °F (36.4 °C)-97.7 °F (36.5 °C)] 97.7 °F (36.5 °C)  HR:  [] 67  BP: (128-141)/(75-82) 128/82  Resp:  [16-18] 16  SpO2:  [100 %] 100 %  O2 Device: None (Room air)    Intake/Output in Past 24 hours:  No intake/output data recorded.  No intake/output data recorded.        Laboratory Results:  I have personally reviewed all pertinent laboratory/tests results.  Most Recent Labs:   Lab Results   Component Value Date    WBC 8.53 03/04/2025    RBC 5.45 03/04/2025    HGB 12.6 03/04/2025    HCT 42.9 03/04/2025     03/04/2025    RDW 14.1 03/04/2025    NEUTROABS 3.56 03/04/2025    SODIUM 140 10/16/2024    K 3.8 10/16/2024     10/16/2024    CO2 29 10/16/2024    BUN 9 10/16/2024    CREATININE 0.91 10/16/2024    GLUC 94 10/16/2024    GLUF 94 10/16/2024    CALCIUM 9.5 10/16/2024    AST 26 09/30/2024    ALT 42 09/30/2024    ALKPHOS 64 09/30/2024    TP 6.6 09/30/2024    ALB 3.8 09/30/2024    TBILI 0.47 09/30/2024    CHOLESTEROL 156 03/14/2024    HDL 44 03/14/2024    TRIG 133 03/14/2024    LDLCALC 85 03/14/2024    NONHDLC 112 03/14/2024    LITHIUM 0.61 01/09/2024    [id number] 1.062 11/15/2023    HGBA1C 5.0 04/01/2024    EAG 97 04/01/2024       Behavioral Health Medications: all current active meds have been reviewed.    Current Facility-Administered Medications   Medication Dose Route Frequency Provider Last Rate    acetaminophen  650 mg Oral Q4H PRN Jordan C Holter, DO      acetaminophen  650 mg Oral Q6H PRN HOLLI Lion      aluminum-magnesium hydroxide-simethicone  30 mL Oral Q4H PRN Jordan C Holter,       amLODIPine  5 mg Oral Daily HOLLI Lion      Artificial Tears  1 drop Both Eyes Q3H PRN Jordan C Holter, DO      haloperidol lactate  2.5 mg Intramuscular Q4H PRN Max 4/day HOLLI Lion      And    LORazepam  1 mg Intramuscular Q4H PRN Max 4/day HOLLI Lion      And    benztropine  0.5 mg Intramuscular Q4H PRN Max 4/day HOLLI Lion       haloperidol lactate  5 mg Intramuscular Q4H PRN Max 4/day HOLLI Lion      And    LORazepam  2 mg Intramuscular Q4H PRN Max 4/day HOLLI Lion      And    benztropine  1 mg Intramuscular Q4H PRN Max 4/day HOLLI Lion      benztropine  0.5 mg Oral BID Jordan C Holter, DO      bisacodyl  10 mg Rectal Daily PRN HOLLI Lion      calcium carbonate  500 mg Oral BID PRN HOLLI Monge      cloZAPine  275 mg Oral HS Ion  Cresencioter, DO      hydrOXYzine HCL  50 mg Oral Q6H PRN Max 4/day Ion  Holter, DO      Or    diphenhydrAMINE  50 mg Intramuscular Q6H PRN Jordan C Holter, DO      hydrOXYzine HCL  50 mg Oral Q6H PRN Max 4/day HOLLI Lion      Or    diphenhydrAMINE  50 mg Intramuscular Q6H PRN HOLLI Lion      diphenhydrAMINE-zinc acetate   Topical BID PRN HOLLI Lion      docusate sodium  100 mg Oral BID Jourdan Dickens, DO      escitalopram  20 mg Oral Daily HOLLI Lion      famotidine  20 mg Oral BID PRN HOLLI Monge      fluticasone  1 spray Each Nare Daily PRN HOLLI Monge      haloperidol  1 mg Oral Q6H PRN HOLLI Lion      haloperidol  2.5 mg Oral Q4H PRN Max 4/day HOLLI Lion      haloperidol  5 mg Oral Q4H PRN Max 4/day HOLLI Lion      haloperidol  5 mg Oral BID Jordan C Holter, DO      hydroCHLOROthiazide  12.5 mg Oral Daily Pia Mantilla, HOLLI      hydrocortisone   Topical 4x Daily PRN HOLLI Lion      hydrOXYzine HCL  100 mg Oral Q6H PRN Max 4/day Jordan C Holter, DO      Or    LORazepam  2 mg Intramuscular Q6H PRN Jordan C Holter, DO      hydrOXYzine HCL  100 mg Oral Q6H PRN Max 4/day HOLLI Lion      Or    LORazepam  2 mg Intramuscular Q6H PRN HOLLI Lion      hydrOXYzine HCL  25 mg Oral Q6H PRN Max 4/day Jordan C Holter, DO      ibuprofen  600 mg Oral Q8H PRN HOLLI Lion      influenza vaccine  0.5 mL Intramuscular Once Bora Rosario MD       loratadine  10 mg Oral Daily Brina Guillen MD      magnesium hydroxide  30 mL Oral Once Jourdan Dickens, DO      melatonin  3 mg Oral HS PRN Bora Rosario MD      melatonin  9 mg Oral HS Bora Rosario MD      methocarbamol  500 mg Oral Q6H PRN HOLLI Lion      multivitamin-minerals  1 tablet Oral Daily Eileen Gonzalezmiryamjaylen, HOLLI      nicotine polacrilex  2 mg Oral Q4H PRN Bora Rosario MD      OLANZapine  5 mg Oral Q4H PRN Max 3/day [facility] Holter, DO      Or    OLANZapine  2.5 mg Intramuscular Q4H PRN Max 3/day [facility] Holter, DO      OLANZapine  5 mg Oral Q3H PRN Max 3/day [facility] Holter, DO      Or    OLANZapine  5 mg Intramuscular Q3H PRN Max 3/day [facility] Holter, DO      OLANZapine  2.5 mg Oral Q4H PRN Max 6/day [facility] Holter, DO      ondansetron  4 mg Oral Q6H PRN HOLLI Lion      pantoprazole  [facility]      polyethylene glycol  17 g Oral Daily PRN HOLLI Ghotra      polyethylene glycol  17 g Oral Daily Jourdan Dickens, DO      propranolol  10 mg Oral Q12H KAYLIE HOLLI Lion      saliva substitute  5 spray Mouth/Throat 4x Daily PRN Mary Jo Reich PA-C      senna-docusate sodium  1 tablet Oral Daily PRN [facility] Holter, DO      senna-docusate sodium  2 tablet Oral HS Jourdan Dickens, DO      traZODone  50 mg Oral HS PRN HOLLI Lion      white petrolatum-mineral oil   Topical TID PRN HOLLI Lion         Risks, benefits and possible side effects of Medications:   Risks, benefits, and possible side effects of medications explained to patient and patient verbalizes understanding and agreement for treatment.    Dual Antipsychotic Rationale: Patient requires dual antipsychotic treatment due to multiple failed medication trials and inability to control symptoms on a single agent.     Mental Status Evaluation:  Appearance:  age appropriate, casually dressed, adequate grooming   Behavior:  cooperative, calm   Speech:  soft, paucity of speech  "  Mood:  \"Stable\"   Affect:  blunted   Thought Process:  organized, linear, normal rate of thoughts   Associations: intact associations   Thought Content:  mild paranoia   Perceptual Disturbances: no visual hallucinations, auditory hallucinations of voices stating they will kill him, no command hallucinations, does not appear responding to internal stimuli   Risk Potential: Suicidal ideation - None  Homicidal ideation - None  Potential for aggression - Not at present   Sensorium:  oriented to person, place, and time/date   Memory:  recent and remote memory grossly intact   Consciousness:  alert and awake   Attention/Concentration: attention span and concentration appear shorter than expected for age   Insight:  limited   Judgment: limited   Gait/Station: In bed   Motor Activity: no abnormal movements     Recommended Treatment:   See above for assessment and plan.    Counseling/Coordination of Care    Total unit time spent today was greater than 15 minutes. Greater than 50% of total time was spent with the patient and/or patient's relatives and/or coordination of patient's care.     Patient's rights, confidentiality, exceptions to confidentiality, use of electronic medical record including appropriate staff access to medical record regarding behavioral health services and consent to treatment were reviewed.    Note Share:     This note was not shared with the patient due to reasonable likelihood of causing patient harm     Brittney Heck   Psychiatry, MS-3  "

## 2025-03-05 NOTE — ASSESSMENT & PLAN NOTE
Evaluated 3/5/2025.  Continues with residual auditory hallucinations. Reports continued depression.  Continue psychotropic medications including:   Continue Clozapine 275 mg nightly to confer mood stability in addition to addressing psychosis.   03/04: , BNP 44, CRP 7.2, ANC 4.08  CBC w/diff and CK every 2 weeks  Continue Cogentin 0.5 mg twice daily for EPS signs/symptoms.   Continue Haldol 5 mg twice daily to address psychosis   Continue Lexapro 20 mg daily to address depression and anxiety  Continue Propanolol 10 mg twice daily to address anxiety   Continue Melatonin 9 mg to address insomnia  On 2 antipsychotics due to subtherapeutic effects with monotherapy  Continue discharge planning: Patient pending ACT team linkage for discharge to his aunt's residence once MA funding comes through from the [facility].

## 2025-03-05 NOTE — NURSING NOTE
Pt is visible in the milieu and social with select peers and staff. He consumed 100 % of dinner. Took his medications without incidence. Pt is polite, pleasant, and cooperative. Brightens on approach. Pt endorse the same threatening AH, but denied all other psychiatric symptoms. No unmet needs. Attended 8/9 groups. No behavioral issues.

## 2025-03-05 NOTE — PLAN OF CARE
Problem: Alteration in Thoughts and Perception  Goal: Treatment Goal: Gain control of psychotic behaviors/thinking, reduce/eliminate presenting symptoms and demonstrate improved reality functioning upon discharge  Outcome: Progressing  Goal: Refrain from acting on delusional thinking/internal stimuli  Description: Interventions:  - Monitor patient closely, per order   - Utilize least restrictive measures   - Set reasonable limits, give positive feedback for acceptable   - Administer medications as ordered and monitor of potential side effects  Outcome: Progressing  Goal: Agree to be compliant with medication regime, as prescribed and report medication side effects  Description: Interventions:  - Offer appropriate PRN medication and supervise ingestion; conduct AIMS, as needed   Outcome: Progressing  Goal: Recognize dysfunctional thoughts, communicate reality-based thoughts at the time of discharge  Description: Interventions:  - Provide medication and psycho-education to assist patient in compliance and developing insight into his/her illness   Outcome: Progressing     Problem: Ineffective Coping  Goal: Identifies ineffective coping skills  Outcome: Progressing  Goal: Identifies healthy coping skills  Outcome: Progressing  Goal: Demonstrates healthy coping skills  Outcome: Progressing     Problem: Depression  Goal: Treatment Goal: Demonstrate behavioral control of depressive symptoms, verbalize feelings of improved mood/affect, and adopt new coping skills prior to discharge  Outcome: Progressing  Goal: Verbalize thoughts and feelings  Description: Interventions:  - Assess and re-assess patient's level of risk   - Engage patient in 1:1 interactions, daily, for a minimum of 15 minutes   - Encourage patient to express feelings, fears, frustrations, hopes   Outcome: Progressing  Goal: Refrain from isolation  Description: Interventions:  - Develop a trusting relationship   - Encourage socialization   Outcome:  Progressing     Problem: Anxiety  Goal: Anxiety is at manageable level  Description: Interventions:  - Assess and monitor patient's anxiety level.   - Monitor for signs and symptoms (heart palpitations, chest pain, shortness of breath, headaches, nausea, feeling jumpy, restlessness, irritable, apprehensive).   - Collaborate with interdisciplinary team and initiate plan and interventions as ordered.  - [place] patient to unit/surroundings  - Explain treatment plan  - Encourage participation in care  - Encourage verbalization of concerns/fears  - Identify coping mechanisms  - Assist in developing anxiety-reducing skills  - Administer/offer alternative therapies  - Limit or eliminate stimulants  Outcome: Progressing     Problem: Alteration in Orientation  Goal: Interact with staff daily  Description: Interventions:  - Assess and re-assess patient's level of orientation  - Engage patient in 1 on 1 interactions, daily, for a minimum of 15 minutes   - Establish rapport/trust with patient   Outcome: Progressing

## 2025-03-05 NOTE — NURSING NOTE
Patient has been visible all shift. Social with peers. Smiling and cooperative. Continues to admit having auditory hallcinations of them wanting to harm him and his family. Support offered.  Offers no other complaints. Medicaiton compliant. Denies suicidal ideations. Attended most of the groups on day shift hours.

## 2025-03-05 NOTE — ASSESSMENT & PLAN NOTE
Evaluated 3/5/25, continue nicotine gum and continue supportive therapy including promotion of smoking cessation

## 2025-03-05 NOTE — PROGRESS NOTES
03/05/25 0758   Team Meeting   Meeting Type Daily Rounds   Team Members Present   Team Members Present Physician;Nurse;;Other (Discipline and Name)   Physician Team Member Holter   Nursing Team Member Claudia   Social Work Team Member Jose J ORTEGA   Other (Discipline and Name) Wang PCM   Patient/Family Present   Patient Present No   Patient's Family Present No     Groups Participation  8/9.   Patient's compliant with medications. He's engaged in his treatment. Reports depression and AH, uses coping skills. Waiting for ACT services to discharge home to family.

## 2025-03-05 NOTE — ASSESSMENT & PLAN NOTE
Evaluated 3/5/25, continue Senokot 2 tablets at bedtime, MiraLAX 17 g daily, and Colace twice daily

## 2025-03-06 PROCEDURE — 99232 SBSQ HOSP IP/OBS MODERATE 35: CPT | Performed by: PSYCHIATRY & NEUROLOGY

## 2025-03-06 RX ADMIN — MULTIPLE VITAMINS W/ MINERALS TAB 1 TABLET: TAB ORAL at 09:02

## 2025-03-06 RX ADMIN — CLOZAPINE 275 MG: 25 TABLET ORAL at 21:31

## 2025-03-06 RX ADMIN — ESCITALOPRAM OXALATE 20 MG: 10 TABLET, FILM COATED ORAL at 09:02

## 2025-03-06 RX ADMIN — HALOPERIDOL 5 MG: 5 TABLET ORAL at 09:02

## 2025-03-06 RX ADMIN — BENZTROPINE MESYLATE 0.5 MG: 0.5 TABLET ORAL at 17:12

## 2025-03-06 RX ADMIN — HYDROCHLOROTHIAZIDE 12.5 MG: 12.5 TABLET ORAL at 09:02

## 2025-03-06 RX ADMIN — NICOTINE POLACRILEX 2 MG: 2 GUM, CHEWING ORAL at 21:32

## 2025-03-06 RX ADMIN — MELATONIN TAB 3 MG 9 MG: 3 TAB at 21:32

## 2025-03-06 RX ADMIN — DOCUSATE SODIUM 100 MG: 100 CAPSULE, LIQUID FILLED ORAL at 17:12

## 2025-03-06 RX ADMIN — NICOTINE POLACRILEX 2 MG: 2 GUM, CHEWING ORAL at 17:12

## 2025-03-06 RX ADMIN — SENNOSIDES AND DOCUSATE SODIUM 2 TABLET: 50; 8.6 TABLET ORAL at 21:32

## 2025-03-06 RX ADMIN — PROPRANOLOL HYDROCHLORIDE 10 MG: 10 TABLET ORAL at 09:02

## 2025-03-06 RX ADMIN — NICOTINE POLACRILEX 2 MG: 2 GUM, CHEWING ORAL at 13:44

## 2025-03-06 RX ADMIN — NICOTINE POLACRILEX 2 MG: 2 GUM, CHEWING ORAL at 09:03

## 2025-03-06 RX ADMIN — PROPRANOLOL HYDROCHLORIDE 10 MG: 10 TABLET ORAL at 21:32

## 2025-03-06 RX ADMIN — PANTOPRAZOLE SODIUM 20 MG: 20 TABLET, DELAYED RELEASE ORAL at 09:02

## 2025-03-06 RX ADMIN — BENZTROPINE MESYLATE 0.5 MG: 0.5 TABLET ORAL at 09:02

## 2025-03-06 RX ADMIN — HALOPERIDOL 5 MG: 5 TABLET ORAL at 17:12

## 2025-03-06 RX ADMIN — DOCUSATE SODIUM 100 MG: 100 CAPSULE, LIQUID FILLED ORAL at 09:02

## 2025-03-06 RX ADMIN — LORATADINE 10 MG: 10 TABLET ORAL at 09:02

## 2025-03-06 NOTE — PLAN OF CARE
Problem: Alteration in Thoughts and Perception  Goal: Agree to be compliant with medication regime, as prescribed and report medication side effects  Description: Interventions:  - Offer appropriate PRN medication and supervise ingestion; conduct AIMS, as needed   Outcome: Progressing  Goal: Recognize dysfunctional thoughts, communicate reality-based thoughts at the time of discharge  Description: Interventions:  - Provide medication and psycho-education to assist patient in compliance and developing insight into his/her illness   Outcome: Progressing     Problem: Ineffective Coping  Goal: Patient/Family participate in treatment and DC plans  Description: Interventions:  - Provide therapeutic environment  Outcome: Progressing  Goal: Patient/Family verbalizes awareness of resources  Outcome: Progressing     Problem: Depression  Goal: Treatment Goal: Demonstrate behavioral control of depressive symptoms, verbalize feelings of improved mood/affect, and adopt new coping skills prior to discharge  Outcome: Progressing  Goal: Verbalize thoughts and feelings  Description: Interventions:  - Assess and re-assess patient's level of risk   - Engage patient in 1:1 interactions, daily, for a minimum of 15 minutes   - Encourage patient to express feelings, fears, frustrations, hopes   Outcome: Progressing  Goal: Refrain from harming self  Description: Interventions:  - Monitor patient closely, per order   - Supervise medication ingestion, monitor effects and side effects   Outcome: Progressing  Goal: Refrain from isolation  Description: Interventions:  - Develop a trusting relationship   - Encourage socialization   Outcome: Progressing  Goal: Refrain from self-neglect  Outcome: Progressing     Problem: Anxiety  Goal: Anxiety is at manageable level  Description: Interventions:  - Assess and monitor patient's anxiety level.   - Monitor for signs and symptoms (heart palpitations, chest pain, shortness of breath, headaches, nausea,  feeling jumpy, restlessness, irritable, apprehensive).   - Collaborate with interdisciplinary team and initiate plan and interventions as ordered.  - [place] patient to unit/surroundings  - Explain treatment plan  - Encourage participation in care  - Encourage verbalization of concerns/fears  - Identify coping mechanisms  - Assist in developing anxiety-reducing skills  - Administer/offer alternative therapies  - Limit or eliminate stimulants  Outcome: Progressing     Problem: Alteration in Orientation  Goal: Treatment Goal: Demonstrate a reduction of confusion and improved orientation to person, place, time and/or situation upon discharge, according to optimum baseline/ability  Outcome: Progressing  Goal: Interact with staff daily  Description: Interventions:  - Assess and re-assess patient's level of orientation  - Engage patient in 1 on 1 interactions, daily, for a minimum of 15 minutes   - Establish rapport/trust with patient   Outcome: Progressing  Goal: Allow medical examinations, as recommended  Description: Interventions:  - Provide physical/neurological exams and/or referrals, per provider   Outcome: Progressing  Goal: Cooperate with recommended testing/procedures  Description: Interventions:  - Determine need for ancillary testing  - Observe for mental status changes  - Implement falls/precaution protocol   Outcome: Progressing  Goal: Complete daily ADLs, including personal hygiene independently, as able  Description: Interventions:  - Observe, teach, and assist patient with ADLS  Outcome: Progressing     Problem: DISCHARGE PLANNING  Goal: Discharge to home with appropriate resources  Description: INTERVENTIONS:  - Identify barriers to discharge w/patient and caregiver  - Arrange for needed discharge resources and transportation as appropriate  - Identify discharge learning needs (meds, wound care, etc.)  - Refer to Case Management Department for coordinating discharge planning if the patient needs  post-hospital services based on physician/advanced practitioner order or complex needs related to functional status, cognitive ability, or social support system  Outcome: Progressing     Problem: Alteration in Thoughts and Perception  Goal: Treatment Goal: Gain control of psychotic behaviors/thinking, reduce/eliminate presenting symptoms and demonstrate improved reality functioning upon discharge  Outcome: Not Progressing  Goal: Refrain from acting on delusional thinking/internal stimuli  Description: Interventions:  - Monitor patient closely, per order   - Utilize least restrictive measures   - Set reasonable limits, give positive feedback for acceptable   - Administer medications as ordered and monitor of potential side effects  Outcome: Not Progressing

## 2025-03-06 NOTE — NURSING NOTE
Alberto was calm and cooperative during assessment. He denies current SI, SH, HI, and VH. He reports the same AH of a voice stating it will harm him and his family, but he states it is not distracting at this time. He attended meals in the milieu and some group programming. He is social and bright with peers and staff. He asked this RN to clarify his nutrition supplement order, which was discussed. He was medication compliant and requested PRN nicorette gum. He denies new concerns at this time.

## 2025-03-06 NOTE — PROGRESS NOTES
03/06/25 0759   Team Meeting   Meeting Type Daily Rounds   Team Members Present   Team Members Present Physician;Nurse;;Other (Discipline and Name)   Physician Team Member Holter   Nursing Team Member Claudia   Social Work Team Member Jose J ORTEGA   Patient/Family Present   Patient Present No   Patient's Family Present No     Groups Participation  9/10.   Patient's compliant with medications. He's engaged in his treatment. Social with his peers. He's waiting ACT services for discharge to family.  Continues to have AH.

## 2025-03-06 NOTE — NURSING NOTE
Patient has been visible most of the shift. Social with select peers. Pleasant and cooperative. Continues to have noncommand auditory hallucinations telling him they will hurt him and his family. Support offered. Medication compliant. Denies suicidal ideations. Hopeful for the future. Group attendance 9/10.

## 2025-03-06 NOTE — PROGRESS NOTES
Progress Note - Behavioral Health   Name: Alberto Berumen 29 y.o. male I MRN: [id number]   Unit/Bed#: [facility] 112-02 I Date of Admission: 3/29/2024   Date of Service: 12/1/2024 I Hospital Day: 247         Assessment & Plan  Schizoaffective disorder, bipolar type (HCC)    Reviewed on 12/1/2024.    Continue Clozapine 225 mg daily at bedtime for psychosis -- to consider further careful titration for Sxs depending on response  Continue to obtain CBC with diff, CRP, and CK (weekly on Tues), and a Clozapine level and BNP (every 2 weeks on Tues). ANC 4.71 11/26/24    Continue Abilify 30 mg once daily for psychosis  Continue Escitalopram 20 mg once daily for depression and anxiety  Continue Senna-Docusate sodium one 50 mg tablet daily before bed for constipation  - decreased from two tablets daily on 11/3 due to reported loose stools  Continue propanolol 10 mg twice daily for anxiety   Continue amlodipine 5 mg PO daily     Pt remains on dual antipsychotic Tx due to failure on monotherapy      ACT team referral was made  No associated orders from this encounter found during lookback period of 72 hours.    Chronic idiopathic constipation    Follows with medical  Continue senna-docusate sodium dose per medical team.   No associated orders from this encounter found during lookback period of 72 hours.  GERD (gastroesophageal reflux disease)    Follows with medical  Continue famotidine 20 mg tablet BID per medical team   Continue glycopyrrolate 1 mg tablet BID AM and afternoon per medical team  Continue glycopyrrolate 2 mg tablet before bed per medical team  Continue pantoprazole EC 20 mg tablet every morning per medical team  No associated orders from this encounter found during lookback period of 72 hours.  Medical clearance for psychiatric admission    Ongoing by medical  No associated orders from this encounter found during lookback period of 72 hours.    Tobacco abuse    Continue to encourage cessation upon discharge  No  [place]5 [place] and suddenly felt left knee pain. Pt did not twist or do anything that she knows of. She took Ibuprofen 600mg before arrival. She can barely put any weight on the left knee.      Triage Assessment (Adult)       Row Name 03/05/25 1950          Triage Assessment    Airway WDL WDL        Respiratory WDL    Respiratory WDL WDL        Skin Circulation/Temperature WDL    Skin Circulation/Temperature WDL WDL        Cardiac WDL    Cardiac WDL WDL        Peripheral/Neurovascular WDL    Peripheral Neurovascular WDL WDL        Cognitive/Neuro/Behavioral WDL    Cognitive/Neuro/Behavioral WDL WDL                      associated orders from this encounter found during lookback period of 72 hours.  Elevated hemoglobin A1c    Follows with medical  No associated orders from this encounter found during lookback period of 72 hours.  Class 2 obesity in adult    Follows with medical  No associated orders from this encounter found during lookback period of 72 hours.    Primary hypertension    Follows with medical  No associated orders from this encounter found during lookback period of 72 hours.       Current medications:    Current Facility-Administered Medications   Medication Dose Route Frequency Provider Last Rate    acetaminophen  650 mg Oral Q4H PRN Jordan C Holter, DO      acetaminophen  650 mg Oral Q6H PRN HOLLI Lion      aluminum-magnesium hydroxide-simethicone  30 mL Oral Q4H PRN Jordan C Holter, DO      amLODIPine  5 mg Oral Daily HOLLI Lion      ARIPiprazole  30 mg Oral Daily Bora Rosario MD      Artificial Tears  1 drop Both Eyes Q3H PRN Jordan C Holter, DO      atropine  1 drop Sublingual HS Harjeet Torres, DO      haloperidol lactate  2.5 mg Intramuscular Q4H PRN Max 4/day HOLLI Lion      And    LORazepam  1 mg Intramuscular Q4H PRN Max 4/day HOLLI Lion      And    benztropine  0.5 mg Intramuscular Q4H PRN Max 4/day HOLLI Lion      benztropine  1 mg Intramuscular Q4H PRN Max 6/day Jordan C Holter, DO      haloperidol lactate  5 mg Intramuscular Q4H PRN Max 4/day HOLLI Lion      And    LORazepam  2 mg Intramuscular Q4H PRN Max 4/day HOLLI Lion      And    benztropine  1 mg Intramuscular Q4H PRN Max 4/day HOLLI Lion      benztropine  1 mg Oral Q4H PRN Max 6/day HOLLI Lion      benztropine  1 mg Oral Q4H PRN Max 6/day Jordan C Holter, DO      bisacodyl  10 mg Rectal Daily PRN HOLLI Lion      calcium carbonate  500 mg Oral BID PRN HOLLI Monge      cloZAPine  225 mg Oral HS Hardik So MD      hydrOXYzine HCL  50 mg  Oral Q6H PRN Max 4/day Ion FISCHER Holter, DO      Or    diphenhydrAMINE  50 mg Intramuscular Q6H PRN Ion FISCHER Holter, DO      hydrOXYzine HCL  50 mg Oral Q6H PRN Max 4/day HOLLI Lion      Or    diphenhydrAMINE  50 mg Intramuscular Q6H PRN HOLLI Lion      diphenhydrAMINE-zinc acetate   Topical BID PRN HOLLI Lion      docusate sodium  100 mg Oral BID Jourdan Dickens DO      escitalopram  20 mg Oral Daily HOLLI Lion      famotidine  20 mg Oral BID PRN HOLLI Monge      fluticasone  1 spray Each Nare Daily PRN HOLLI Monge      glycopyrrolate  1 mg Oral BID (AM & Afternoon) Bora Rosario MD      glycopyrrolate  2 mg Oral HS Bora Rosario MD      haloperidol  1 mg Oral Q6H PRN HOLLI Lion      haloperidol  2.5 mg Oral Q4H PRN Max 4/day HOLLI Lion      haloperidol  5 mg Oral Q4H PRN Max 4/day HOLLI Lion      hydroCHLOROthiazide  12.5 mg Oral Daily HOLLI Galvan      hydrocortisone   Topical 4x Daily PRN HOLLI Lion      hydrOXYzine HCL  100 mg Oral Q6H PRN Max 4/day Ion FISCHER Holter, DO      Or    LORazepam  2 mg Intramuscular Q6H PRN Ion FISCHER Holter, DO      hydrOXYzine HCL  100 mg Oral Q6H PRN Max 4/day HOLLI Lion      Or    LORazepam  2 mg Intramuscular Q6H PRN HOLLI Lion      hydrOXYzine HCL  25 mg Oral Q6H PRN Max 4/day Ion FISCHER Holter, DO      ibuprofen  600 mg Oral Q8H PRN HOLLI Lion      influenza vaccine  0.5 mL Intramuscular Once Bora Rosario MD      loratadine  10 mg Oral Daily Brina Guillen MD      magnesium hydroxide  30 mL Oral Once Jourdan Dickens, DO      melatonin  3 mg Oral HS PRN Bora Rosario MD      melatonin  9 mg Oral HS Bora Rosario MD      methocarbamol  500 mg Oral Q6H PRN HOLLI Lion      multivitamin-minerals  1 tablet Oral Daily Eileen Miya Kormandy, CRNP      nicotine polacrilex  2 mg Oral Q4H PRN Bora Rosario MD      OLANZapine  5 mg Oral Q4H PRN  Max 3/day Ion C Holter, DO      Or    OLANZapine  2.5 mg Intramuscular Q4H PRN Max 3/day [facility] Holter, DO      OLANZapine  5 mg Oral Q3H PRN Max 3/day [facility] Holter, DO      Or    OLANZapine  5 mg Intramuscular Q3H PRN Max 3/day [facility] Holter, DO      OLANZapine  2.5 mg Oral Q4H PRN Max 6/day [facility] Holter, DO      ondansetron  4 mg Oral Q6H PRN HOLLI Lion      pantoprazole  20 mg Oral QAM [facility] Holter, DO      polyethylene glycol  17 g Oral Daily PRN Sofi Yoo, HOLLI      polyethylene glycol  17 g Oral Daily Jourdan Dickens, DO      propranolol  10 mg Oral Q12H KAYLIE HOLLI Lion      senna-docusate sodium  1 tablet Oral Daily PRN Ion  Holter, DO      senna-docusate sodium  2 tablet Oral HS Jourdan Dickens, DO      traZODone  50 mg Oral HS PRN HOLLI Lion      white petrolatum-mineral oil   Topical TID PRN HOLLI Lion         Risks / Benefits of Treatment:    Risks, benefits, and possible side effects of medications explained to patient and patient verbalizes understanding and agreement for treatment.    Treatment Planning:     All current active medications have been reviewed  Encourage group therapy, milieu therapy and occupational therapy  Behavioral Health checks for safety monitoring  Discharge planning  Continue current medications:  Estimated Discharge Date: 12/19/2024     Subjective:    Behavior over the last 24 hours: unchanged.     On assessment today, pt denies any changes over past 24 hours.  He reports depression and anxiety have been controlled.  He denies passive or active SI/HI with plan or intent.  He reports AH of people wanting to hurt him are ongoing  with no recent change to severity of symptoms.  He reports he is eating and sleeping well.  He is social with peers on unit and has been participating in unit activities.     Sleep: normal  Appetite: normal  Medication side effects: No   ROS: no complaints    Mental Status Evaluation:    Appearance:   age appropriate, casually dressed, adequate grooming   Behavior:  pleasant, cooperative, calm   Speech:  normal rate, normal volume, normal pitch   Mood:  euthymic   Affect:  appropriate   Thought Process:  coherent, goal directed   Associations: intact associations   Thought Content:  some paranoia   Perceptual Disturbances: auditory hallucinations of threatening voices, denies VH when asked, does not appear to be responding to IS   Risk Potential: Suicidal ideation - None  Homicidal ideation - None  Potential for aggression - No   Sensorium:  oriented to person, place, and time/date   Memory:  recent and remote memory grossly intact   Consciousness:  alert and awake   Attention/Concentration: attention span and concentration are age appropriate   Insight:  fair   Judgment: fair   Gait/Station: normal gait/station   Motor Activity: no abnormal movements     Vital signs in last 24 hours:    Temp:  [97.7 °F (36.5 °C)-98.2 °F (36.8 °C)] 98.2 °F (36.8 °C)  HR:  [] 85  BP: (102-126)/(59-72) 114/72  Resp:  [17-18] 17  SpO2:  [96 %-98 %] 98 %  O2 Device: None (Room air)         Laboratory results: I have personally reviewed all pertinent laboratory/tests results    Results from the past 24 hours: No results found for this or any previous visit (from the past 24 hours).    Suicide/Homicide Risk Assessment:    Risk of Harm to Self:   Nursing Suicide Risk Assessment Last 24 hours: C-SSRS Risk (Since Last Contact)  Calculated C-SSRS Risk Score (Since Last Contact): No Risk Indicated    Risk of Harm to Others:  Nursing Homicide Risk Assessment: Violence Risk to Others: Denies within past 6 months

## 2025-03-07 PROCEDURE — 99232 SBSQ HOSP IP/OBS MODERATE 35: CPT | Performed by: PSYCHIATRY & NEUROLOGY

## 2025-03-07 RX ADMIN — LORATADINE 10 MG: 10 TABLET ORAL at 08:48

## 2025-03-07 RX ADMIN — MULTIPLE VITAMINS W/ MINERALS TAB 1 TABLET: TAB ORAL at 08:49

## 2025-03-07 RX ADMIN — PANTOPRAZOLE SODIUM 20 MG: 20 TABLET, DELAYED RELEASE ORAL at 08:49

## 2025-03-07 RX ADMIN — ESCITALOPRAM OXALATE 20 MG: 10 TABLET, FILM COATED ORAL at 08:49

## 2025-03-07 RX ADMIN — NICOTINE POLACRILEX 2 MG: 2 GUM, CHEWING ORAL at 12:33

## 2025-03-07 RX ADMIN — MELATONIN TAB 3 MG 9 MG: 3 TAB at 21:07

## 2025-03-07 RX ADMIN — NICOTINE POLACRILEX 2 MG: 2 GUM, CHEWING ORAL at 08:48

## 2025-03-07 RX ADMIN — PROPRANOLOL HYDROCHLORIDE 10 MG: 10 TABLET ORAL at 08:48

## 2025-03-07 RX ADMIN — BENZTROPINE MESYLATE 0.5 MG: 0.5 TABLET ORAL at 08:49

## 2025-03-07 RX ADMIN — CLOZAPINE 275 MG: 25 TABLET ORAL at 21:07

## 2025-03-07 RX ADMIN — HYDROCHLOROTHIAZIDE 12.5 MG: 12.5 TABLET ORAL at 08:48

## 2025-03-07 RX ADMIN — HALOPERIDOL 5 MG: 5 TABLET ORAL at 08:49

## 2025-03-07 RX ADMIN — DOCUSATE SODIUM 100 MG: 100 CAPSULE, LIQUID FILLED ORAL at 17:26

## 2025-03-07 RX ADMIN — BENZTROPINE MESYLATE 0.5 MG: 0.5 TABLET ORAL at 17:26

## 2025-03-07 RX ADMIN — HALOPERIDOL 5 MG: 5 TABLET ORAL at 17:26

## 2025-03-07 RX ADMIN — NICOTINE POLACRILEX 2 MG: 2 GUM, CHEWING ORAL at 21:07

## 2025-03-07 RX ADMIN — SENNOSIDES AND DOCUSATE SODIUM 2 TABLET: 50; 8.6 TABLET ORAL at 21:07

## 2025-03-07 RX ADMIN — NICOTINE POLACRILEX 2 MG: 2 GUM, CHEWING ORAL at 17:26

## 2025-03-07 RX ADMIN — DOCUSATE SODIUM 100 MG: 100 CAPSULE, LIQUID FILLED ORAL at 08:49

## 2025-03-07 RX ADMIN — PROPRANOLOL HYDROCHLORIDE 10 MG: 10 TABLET ORAL at 21:07

## 2025-03-07 NOTE — ASSESSMENT & PLAN NOTE
Continue psychotropic medications including:   Continue Clozapine 275 mg nightly to confer mood stability in addition to addressing psychosis.   03/04: , BNP 44, CRP 7.2, ANC 4.08  CBC w/diff and CK every 2 weeks  Continue Cogentin 0.5 mg twice daily for EPS signs/symptoms.   Continue Haldol 5 mg twice daily to address psychosis   Continue Lexapro 20 mg daily to address depression and anxiety  Continue Propanolol 10 mg twice daily to address anxiety   Continue Melatonin 9 mg to address insomnia  On 2 antipsychotics due to subtherapeutic effects with monotherapy  Continue discharge planning: Patient pending ACT team linkage for discharge to his aunt's residence once MA funding comes through from the [facility].      No associated orders from this encounter found during lookback period of 72 hours.

## 2025-03-07 NOTE — ASSESSMENT & PLAN NOTE
Evaluated 3/5/25, continue nicotine gum and continue supportive therapy including promotion of smoking cessation        No associated orders from this encounter found during lookback period of 72 hours.

## 2025-03-07 NOTE — PROGRESS NOTES
Psychiatric Progress Note - Department of Behavioral Health   Alberto Berumen 29 y.o. male MRN: [id number]  Unit/Bed#: [facility] 112-02 Encounter: [id number]    ASSESSMENT & PLAN     Assessment & Plan  Schizoaffective disorder, bipolar type (HCC)    Continue psychotropic medications including:   Continue Clozapine 275 mg nightly to confer mood stability in addition to addressing psychosis.   03/04: , BNP 44, CRP 7.2, ANC 4.08  CBC w/diff and CK every 2 weeks  Continue Cogentin 0.5 mg twice daily for EPS signs/symptoms.   Continue Haldol 5 mg twice daily to address psychosis   Continue Lexapro 20 mg daily to address depression and anxiety  Continue Propanolol 10 mg twice daily to address anxiety   Continue Melatonin 9 mg to address insomnia  On 2 antipsychotics due to subtherapeutic effects with monotherapy  Continue discharge planning: Patient pending ACT team linkage for discharge to his aunt's residence once MA funding comes through from the [facility].      No associated orders from this encounter found during lookback period of 72 hours.    Chronic idiopathic constipation  Evaluated 3/5/25, continue Senokot 2 tablets at bedtime, MiraLAX 17 g daily, and Colace twice daily      No associated orders from this encounter found during lookback period of 72 hours.  Tobacco abuse  Evaluated 3/5/25, continue nicotine gum and continue supportive therapy including promotion of smoking cessation        No associated orders from this encounter found during lookback period of 72 hours.  Class 2 obesity in adult  Evaluated 3/5/25  most recent weight 242 pounds on March 2, 2025, continue dietary management        No associated orders from this encounter found during lookback period of 72 hours.    Primary hypertension  Evaluated 3/5/25, continue Norvasc 5 mg daily and hydrochlorothiazide 12.5 mg daily      No associated orders from this encounter found during lookback period of 72 hours.  GERD (gastroesophageal  reflux disease)  Continue medical management      No associated orders from this encounter found during lookback period of 72 hours.  Elevated hemoglobin A1c  Continue medical management      No associated orders from this encounter found during lookback period of 72 hours.    Treatment Recommendations/Precautions:  Continue to promote patient participation in therapeutic milieu.  Continue medical management per medicine.  Continue previously prescribed psychotropic medication regimen; see below.  Continue behavioral health checks q.15 minutes.   Continue vitals per behavioral health unit protocol.  Discharge date per primary team; 201 commitment status.    SUBJECTIVE     Patient evaluated this a.m. for continuity of care. Patient was discussed at length with nursing and treatment team. Per nursing, patient remains calm, cooperative, present in the milieu, adherent to his medications without any acute adverse effects. No acute distress is noted throughout evaluation. Alberto Berumen denies suicidal/homicidal ideation in addition to thoughts of self-injury, receptive to crisis planning provided by this writer, contacting for safety in the inpatient setting, admitting to an ability to appropriately confide in staff including this writer. Patient admits to slight depression and anxiety, although he appears significantly less somatically preoccupied, admitting to intermittent instances of auditory hallucinations that are negative and derogatory in content, accepted by ACT team, awaiting discharge disposition next week    PSYCHIATRIC REVIEW OF SYSTEMS     Behavior over the last 24 hours:  unchanged  Sleep: adequate  Appetite: adequate  Medication side effects: No    REVIEW OF SYSTEMS   Review of systems: no complaints    OBJECTIVE     Vital Signs in Past 24 Hours:  Temp:  [97.5 °F (36.4 °C)-97.6 °F (36.4 °C)] 97.6 °F (36.4 °C)  HR:  [] 80  BP: (114-129)/(68-73) 114/68  Resp:  [17-18] 17  SpO2:  [99 %] 99 %  O2  Device: None (Room air)    Intake/Output in Past 24 hours:  No intake/output data recorded.  No intake/output data recorded.        Laboratory Results:  I have personally reviewed all pertinent laboratory/tests results.  Most Recent Labs:   Lab Results   Component Value Date    WBC 8.53 03/04/2025    RBC 5.45 03/04/2025    HGB 12.6 03/04/2025    HCT 42.9 03/04/2025     03/04/2025    RDW 14.1 03/04/2025    NEUTROABS 3.56 03/04/2025    SODIUM 140 10/16/2024    K 3.8 10/16/2024     10/16/2024    CO2 29 10/16/2024    BUN 9 10/16/2024    CREATININE 0.91 10/16/2024    GLUC 94 10/16/2024    GLUF 94 10/16/2024    CALCIUM 9.5 10/16/2024    AST 26 09/30/2024    ALT 42 09/30/2024    ALKPHOS 64 09/30/2024    TP 6.6 09/30/2024    ALB 3.8 09/30/2024    TBILI 0.47 09/30/2024    CHOLESTEROL 156 03/14/2024    HDL 44 03/14/2024    TRIG 133 03/14/2024    LDLCALC 85 03/14/2024    NONHDLC 112 03/14/2024    LITHIUM 0.61 01/09/2024    [id number] 1.062 11/15/2023    HGBA1C 5.0 04/01/2024    EAG 97 04/01/2024       Behavioral Health Medications: all current active meds have been reviewed and current meds:   Current Facility-Administered Medications:     acetaminophen (TYLENOL) tablet 650 mg, Q4H PRN    acetaminophen (TYLENOL) tablet 650 mg, Q6H PRN    aluminum-magnesium hydroxide-simethicone (MAALOX) oral suspension 30 mL, Q4H PRN    amLODIPine (NORVASC) tablet 5 mg, Daily    Artificial Tears ophthalmic solution 1 drop, Q3H PRN    haloperidol lactate (HALDOL) injection 2.5 mg, Q4H PRN Max 4/day **AND** LORazepam (ATIVAN) injection 1 mg, Q4H PRN Max 4/day **AND** benztropine (COGENTIN) injection 0.5 mg, Q4H PRN Max 4/day    haloperidol lactate (HALDOL) injection 5 mg, Q4H PRN Max 4/day **AND** LORazepam (ATIVAN) injection 2 mg, Q4H PRN Max 4/day **AND** benztropine (COGENTIN) injection 1 mg, Q4H PRN Max 4/day    benztropine (COGENTIN) tablet 0.5 mg, BID    bisacodyl (DULCOLAX) rectal suppository 10 mg, Daily PRN    calcium  carbonate (TUMS) chewable tablet 500 mg, BID PRN    cloZAPine (CLOZARIL) tablet 275 mg, HS    hydrOXYzine HCL (ATARAX) tablet 50 mg, Q6H PRN Max 4/day **OR** diphenhydrAMINE (BENADRYL) injection 50 mg, Q6H PRN    hydrOXYzine HCL (ATARAX) tablet 50 mg, Q6H PRN Max 4/day **OR** diphenhydrAMINE (BENADRYL) injection 50 mg, Q6H PRN    diphenhydrAMINE-zinc acetate (BENADRYL) 2-0.1 % cream, BID PRN    docusate sodium (COLACE) capsule 100 mg, BID    escitalopram (LEXAPRO) tablet 20 mg, Daily    famotidine (PEPCID) tablet 20 mg, BID PRN    fluticasone (FLONASE) 50 mcg/act nasal spray 1 spray, Daily PRN    haloperidol (HALDOL) tablet 1 mg, Q6H PRN    haloperidol (HALDOL) tablet 2.5 mg, Q4H PRN Max 4/day    haloperidol (HALDOL) tablet 5 mg, Q4H PRN Max 4/day    haloperidol (HALDOL) tablet 5 mg, BID    hydroCHLOROthiazide tablet 12.5 mg, Daily    hydrocortisone 1 % ointment, 4x Daily PRN    hydrOXYzine HCL (ATARAX) tablet 100 mg, Q6H PRN Max 4/day **OR** LORazepam (ATIVAN) injection 2 mg, Q6H PRN    hydrOXYzine HCL (ATARAX) tablet 100 mg, Q6H PRN Max 4/day **OR** LORazepam (ATIVAN) injection 2 mg, Q6H PRN    hydrOXYzine HCL (ATARAX) tablet 25 mg, Q6H PRN Max 4/day    ibuprofen (MOTRIN) tablet 600 mg, Q8H PRN    influenza vaccine (PF) (Fluarix) IM injection 0.5 mL, Once    loratadine (CLARITIN) tablet 10 mg, Daily    magnesium hydroxide (MILK OF MAGNESIA) oral suspension 30 mL, Once    melatonin tablet 3 mg, HS PRN    melatonin tablet 9 mg, HS    methocarbamol (ROBAXIN) tablet 500 mg, Q6H PRN    multivitamin-minerals (CENTRUM) tablet 1 tablet, Daily    nicotine polacrilex (NICORETTE) gum 2 mg, Q4H PRN    OLANZapine (ZyPREXA) tablet 5 mg, Q4H PRN Max 3/day **OR** OLANZapine (ZyPREXA) IM injection 2.5 mg, Q4H PRN Max 3/day    OLANZapine (ZyPREXA) tablet 5 mg, Q3H PRN Max 3/day **OR** OLANZapine (ZyPREXA) IM injection 5 mg, Q3H PRN Max 3/day    OLANZapine (ZyPREXA) tablet 2.5 mg, Q4H PRN Max 6/day    ondansetron (ZOFRAN-ODT)  dispersible tablet 4 mg, Q6H PRN    pantoprazole (PROTONIX) EC tablet 20 mg, QAM    polyethylene glycol (MIRALAX) packet 17 g, Daily PRN    polyethylene glycol (MIRALAX) packet 17 g, Daily    propranolol (INDERAL) tablet 10 mg, Q12H KAYLIE    saliva substitute (MOUTH KOTE) mucosal solution 5 spray, 4x Daily PRN    senna-docusate sodium (SENOKOT S) 8.6-50 mg per tablet 1 tablet, Daily PRN    senna-docusate sodium (SENOKOT S) 8.6-50 mg per tablet 2 tablet, HS    traZODone (DESYREL) tablet 50 mg, HS PRN    white petrolatum-mineral oil (EUCERIN,HYDROCERIN) cream, TID PRN.    Risks, benefits and possible side effects of Medications:   Risks, benefits, and possible side effects of medications explained to patient and patient verbalizes understanding and agreement for treatment.    Mental Status Evaluation:  Appearance:  age appropriate, bearded, casually dressed, and disheveled   Behavior:  psychomotor retardation   Speech:  soft and paucity of speech   Mood:  anxious, depressed, and dysthymic   Affect:  blunted and mood-incongruent   Language sparse   Thought Process:  goal directed   Thought Content:  Appears paranoid, negative thinking   Perceptual Disturbances: Auditory hallucinations without commands   Risk Potential: Suicidal Ideations none, Homicidal Ideations none, and Potential for Aggression No   Sensorium:  person, place, and time/date   Cognition:  recent and remote memory grossly intact   Consciousness:  alert and awake    Attention: attention span appeared shorter than expected for age   Insight:  limited   Judgment: limited   Intellect Not assessed   Gait/Station: normal gait/station and normal balance   Motor Activity: no abnormal movements     Memory: Short and long term memory fair     Progress Toward Goals: Unchanged, as evidenced by their participation (or lack thereof) in individual, social and therapeutic milieu in addition to adherence to their medication regimen.    Recommended Treatment:   See above  for assessment and plan.    Inpatient Psychiatric Certification: Based upon physical, mental and social evaluations, I certify that inpatient psychiatric services are medically necessary for this patient for a duration of greater than 2 midnights for the treatment of Schizoaffective disorder, bipolar type (HCC) including psychotropic medication management, participation in the therapeutic milieu and referrals as indicated. Available alternative community resources do not meet the patient's mental health care needs. I further attest that an established written individualized plan of care has been implemented and is outlined in the patient's medical records.    Counseling/Coordination of Care    I have expended greater than 15 minutes in which more than 50% of this time was expended in counseling/coordination of patient care relating to diagnostic results, prognosis, potential risks and benefits of management options, instructions for appropriate management, patient and/or collateral education, importance of adherence to management and/or risk factor reductions. Patient's rights, confidentiality, exceptions to confidentiality, use of electronic medical record including appropriate staff access to medical record regarding behavioral health services and consent to treatment were reviewed.    Note Share:     This note was not shared with the patient due to reasonable likelihood of causing patient harm     This note has been constructed using a voice recognition system. There may be translation, syntax,  or grammatical errors. If you have any questions, please contact the dictating provider.    Jordan Christopher Holter, DO 03/07/25

## 2025-03-07 NOTE — NURSING NOTE
"Pt is accepting of medications without incidence and meal complaint consuming 0% of breakfast, but 100% of lunch. Pt reports \"the same\" AH, anxiety and depression, but denies all other s/s. Pt is polite, pleasant, social and visible in the milieu. Attends groups and makes needs known. Pt jokes with staff and select peers and offers no new concerns.   "

## 2025-03-07 NOTE — DISCHARGE INSTR - APPOINTMENTS
Behavioral Health Nurse Navigator, Glo or Ladan will be calling you after your discharge, on the phone number that you provided.  They will be available as an additional support, if needed.   If you wish to speak with Glo, you may contact her at [phone].

## 2025-03-07 NOTE — DISCHARGE INSTR - OTHER ORDERS
You are being discharged to your home at [address] [facility] [address]     If you or someone you know is struggling or in crisis, help is available. Call or text 247 or chat [employer]. You can also reach Crisis Text Line by texting [facility] to 161491.    [facility] Crisis Hotline: 494.282.7067 toll free: 1-356.293.2371   *National Suicide Prevention Lifeline:  1-376.795.6847  *Alcohol Anonymous: 452.872.9270  *[facility]: (391) 596-8746  *National [place] on Mental Illness (BILL) HELPLINE: 920.165.9356/Website: www.bill.org  *Substance Abuse and Mental Health Services Administration(SAMHSA) National Helpline, which is a confidential, free, 24-hour-a-day, 365-day-a-year, information service for individuals and family members facing mental health and/or substance use disorders. This service provides referrals to local treatment facilities, support groups, and community-based organizations. Callers can also order free publications and other information.  Call 1-637.613.6597/Website: www.[facility].gov  *[employer] 2-1-1: This is a toll free, confidential, 24-hour-a-day service which connects you to a community  in your area who can help you find services and resources that are available to you locally and provide critical services that can improve and save lives.  Call: 211  /Website: http://www.[employer].org/

## 2025-03-07 NOTE — ASSESSMENT & PLAN NOTE
Evaluated 3/5/25, continue Norvasc 5 mg daily and hydrochlorothiazide 12.5 mg daily      No associated orders from this encounter found during lookback period of 72 hours.

## 2025-03-07 NOTE — NURSING NOTE
Pt is visible on the unit and social with select peers. Took medications without incidence. Pt is pleasant and cooperative. Attended 10/11 groups. Reports anxiety, depression, AHs that are threatening in nature. Denies SI/HI/VH. Reports hand pain has now improved. No behavioral issues. Pt offers no new complaints.

## 2025-03-07 NOTE — PROGRESS NOTES
03/07/25 0828   Team Meeting   Meeting Type Daily Rounds   Team Members Present   Team Members Present Physician;Nurse;;Other (Discipline and Name)   Physician Team Member Joaquina VALDEZ   Nursing Team Member Claudia   Social Work Team Member Jose J ORTEGA   Patient/Family Present   Patient Present No   Patient's Family Present No     Groups Participation  9/12.   Patient's compliant with medications. Social with select female peers. He's appropriate and engaged in treatment.

## 2025-03-07 NOTE — ASSESSMENT & PLAN NOTE
Evaluated 3/5/25  most recent weight 242 pounds on March 2, 2025, continue dietary management        No associated orders from this encounter found during lookback period of 72 hours.

## 2025-03-07 NOTE — PLAN OF CARE
Problem: Alteration in Thoughts and Perception  Goal: Refrain from acting on delusional thinking/internal stimuli  Description: Interventions:  - Monitor patient closely, per order   - Utilize least restrictive measures   - Set reasonable limits, give positive feedback for acceptable   - Administer medications as ordered and monitor of potential side effects  Outcome: Progressing  Goal: Agree to be compliant with medication regime, as prescribed and report medication side effects  Description: Interventions:  - Offer appropriate PRN medication and supervise ingestion; conduct AIMS, as needed   Outcome: Progressing     Problem: Ineffective Coping  Goal: Identifies ineffective coping skills  Outcome: Progressing  Goal: Identifies healthy coping skills  Outcome: Progressing  Goal: Demonstrates healthy coping skills  Outcome: Progressing     Problem: Depression  Goal: Refrain from harming self  Description: Interventions:  - Monitor patient closely, per order   - Supervise medication ingestion, monitor effects and side effects   Outcome: Progressing  Goal: Refrain from isolation  Description: Interventions:  - Develop a trusting relationship   - Encourage socialization   Outcome: Progressing  Goal: Refrain from self-neglect  Outcome: Progressing     Problem: Anxiety  Goal: Anxiety is at manageable level  Description: Interventions:  - Assess and monitor patient's anxiety level.   - Monitor for signs and symptoms (heart palpitations, chest pain, shortness of breath, headaches, nausea, feeling jumpy, restlessness, irritable, apprehensive).   - Collaborate with interdisciplinary team and initiate plan and interventions as ordered.  - [place] patient to unit/surroundings  - Explain treatment plan  - Encourage participation in care  - Encourage verbalization of concerns/fears  - Identify coping mechanisms  - Assist in developing anxiety-reducing skills  - Administer/offer alternative therapies  - Limit or eliminate  stimulants  Outcome: Progressing     Problem: Alteration in Orientation  Goal: Treatment Goal: Demonstrate a reduction of confusion and improved orientation to person, place, time and/or situation upon discharge, according to optimum baseline/ability  Outcome: Progressing  Goal: Complete daily ADLs, including personal hygiene independently, as able  Description: Interventions:  - Observe, teach, and assist patient with ADLS  Outcome: Progressing

## 2025-03-07 NOTE — SOCIAL WORK
[facility] reports they can open patient's case next week Wednesday or Thursday. Treatment team discussed patient can be discharged next week. This writer contacted patient's Aunt, who is pleased with this news. She reports she's only available on Friday for a CSP and discharge. This writer informed [facility] and [facility].   This writer met with patient and discussed Rep Payee services through [facility] or [facility]. This writer informed patient he will be discharged next week.   This writer spoke with Ileana Mcfadden with [facility]. ACT intake scheduled for 3/13@1030. CSP and discharge scheduled for 3/14@1000.   Patient's scripts to [facility] [address] [facility], [place] [address] Phone [phone] Fax: [phone]

## 2025-03-07 NOTE — ASSESSMENT & PLAN NOTE
Evaluated 3/5/25, continue Senokot 2 tablets at bedtime, MiraLAX 17 g daily, and Colace twice daily      No associated orders from this encounter found during lookback period of 72 hours.

## 2025-03-07 NOTE — ASSESSMENT & PLAN NOTE
Continue psychotropic medications including:   Continue Clozapine 275 mg nightly to confer mood stability in addition to addressing psychosis.   03/04: , BNP 44, CRP 7.2, ANC 4.08  CBC w/diff and CK every 2 weeks  Continue Cogentin 0.5 mg twice daily for EPS signs/symptoms.   Continue Haldol 5 mg twice daily to address psychosis   Continue Lexapro 20 mg daily to address depression and anxiety  Continue Propanolol 10 mg twice daily to address anxiety   Continue Melatonin 9 mg to address insomnia  On 2 antipsychotics due to subtherapeutic effects with monotherapy  Continue discharge planning: Patient pending ACT team linkage for discharge to his aunt's residence once MA funding comes through from the [facility].      No associated orders from this encounter found during lookback period of 72 hours.

## 2025-03-07 NOTE — PROGRESS NOTES
Psychiatric Progress Note - Department of Behavioral Health   Alberto Berumen 29 y.o. male MRN: [id number]  Unit/Bed#: [facility] 112-02 Encounter: [id number]    ASSESSMENT & PLAN     Assessment & Plan  Schizoaffective disorder, bipolar type (HCC)    Continue psychotropic medications including:   Continue Clozapine 275 mg nightly to confer mood stability in addition to addressing psychosis.   03/04: , BNP 44, CRP 7.2, ANC 4.08  CBC w/diff and CK every 2 weeks  Continue Cogentin 0.5 mg twice daily for EPS signs/symptoms.   Continue Haldol 5 mg twice daily to address psychosis   Continue Lexapro 20 mg daily to address depression and anxiety  Continue Propanolol 10 mg twice daily to address anxiety   Continue Melatonin 9 mg to address insomnia  On 2 antipsychotics due to subtherapeutic effects with monotherapy  Continue discharge planning: Patient pending ACT team linkage for discharge to his aunt's residence once MA funding comes through from the [facility].      No associated orders from this encounter found during lookback period of 72 hours.    Chronic idiopathic constipation  Evaluated 3/5/25, continue Senokot 2 tablets at bedtime, MiraLAX 17 g daily, and Colace twice daily      No associated orders from this encounter found during lookback period of 72 hours.  Tobacco abuse  Evaluated 3/5/25, continue nicotine gum and continue supportive therapy including promotion of smoking cessation        No associated orders from this encounter found during lookback period of 72 hours.  Class 2 obesity in adult  Evaluated 3/5/25  most recent weight 242 pounds on March 2, 2025, continue dietary management        No associated orders from this encounter found during lookback period of 72 hours.    Primary hypertension  Evaluated 3/5/25, continue Norvasc 5 mg daily and hydrochlorothiazide 12.5 mg daily      No associated orders from this encounter found during lookback period of 72 hours.  GERD (gastroesophageal  reflux disease)  Continue medical management      No associated orders from this encounter found during lookback period of 72 hours.  Elevated hemoglobin A1c  Continue medical management      No associated orders from this encounter found during lookback period of 72 hours.    Treatment Recommendations/Precautions:  Continue to promote patient participation in therapeutic milieu.  Continue medical management per medicine.  Continue previously prescribed psychotropic medication regimen; see below.  Continue behavioral health checks q.15 minutes.   Continue vitals per behavioral health unit protocol.  Discharge date per primary team; 201 commitment status.    SUBJECTIVE     Patient evaluated this a.m. for continuity of care. Patient was discussed at length with nursing and treatment team. Per nursing, patient means calm, cooperative, present and appropriately interactive in the milieu, adherent to his medications without any acute adverse effects. No acute distress is noted throughout evaluation. Alberto Berumen denies suicidal/homicidal ideation in addition to thoughts of self-injury, receptive to crisis planning provided by this writer, contacting for safety in the inpatient setting, admitting to an ability to appropriately confide in staff including this writer.  Patient appears scant, sparse, superficial, although alludes to depression and anxiety accompanied by intermittent instances of auditory hallucinations, denying any additional psychiatric complaint/concerns, awaiting ACT team linkage    PSYCHIATRIC REVIEW OF SYSTEMS     Behavior over the last 24 hours:  unchanged  Sleep: adequate  Appetite: adequate  Medication side effects: none    REVIEW OF SYSTEMS   Review of systems: no complaints    OBJECTIVE     Vital Signs in Past 24 Hours:  Temp:  [97.5 °F (36.4 °C)] 97.5 °F (36.4 °C)  HR:  [] 100  BP: (116-129)/(68-73) 129/72  Resp:  [18] 18  SpO2:  [99 %] 99 %  O2 Device: None (Room air)    Intake/Output  in Past 24 hours:  No intake/output data recorded.  No intake/output data recorded.        Laboratory Results:  I have personally reviewed all pertinent laboratory/tests results.  Most Recent Labs:   Lab Results   Component Value Date    WBC 8.53 03/04/2025    RBC 5.45 03/04/2025    HGB 12.6 03/04/2025    HCT 42.9 03/04/2025     03/04/2025    RDW 14.1 03/04/2025    NEUTROABS 3.56 03/04/2025    SODIUM 140 10/16/2024    K 3.8 10/16/2024     10/16/2024    CO2 29 10/16/2024    BUN 9 10/16/2024    CREATININE 0.91 10/16/2024    GLUC 94 10/16/2024    GLUF 94 10/16/2024    CALCIUM 9.5 10/16/2024    AST 26 09/30/2024    ALT 42 09/30/2024    ALKPHOS 64 09/30/2024    TP 6.6 09/30/2024    ALB 3.8 09/30/2024    TBILI 0.47 09/30/2024    CHOLESTEROL 156 03/14/2024    HDL 44 03/14/2024    TRIG 133 03/14/2024    LDLCALC 85 03/14/2024    NONHDLC 112 03/14/2024    LITHIUM 0.61 01/09/2024    DAG3UHLRXGCB 1.062 11/15/2023    HGBA1C 5.0 04/01/2024    EAG 97 04/01/2024       Behavioral Health Medications: all current active meds have been reviewed and current meds:   Current Facility-Administered Medications:     acetaminophen (TYLENOL) tablet 650 mg, Q4H PRN    acetaminophen (TYLENOL) tablet 650 mg, Q6H PRN    aluminum-magnesium hydroxide-simethicone (MAALOX) oral suspension 30 mL, Q4H PRN    amLODIPine (NORVASC) tablet 5 mg, Daily    Artificial Tears ophthalmic solution 1 drop, Q3H PRN    haloperidol lactate (HALDOL) injection 2.5 mg, Q4H PRN Max 4/day **AND** LORazepam (ATIVAN) injection 1 mg, Q4H PRN Max 4/day **AND** benztropine (COGENTIN) injection 0.5 mg, Q4H PRN Max 4/day    haloperidol lactate (HALDOL) injection 5 mg, Q4H PRN Max 4/day **AND** LORazepam (ATIVAN) injection 2 mg, Q4H PRN Max 4/day **AND** benztropine (COGENTIN) injection 1 mg, Q4H PRN Max 4/day    benztropine (COGENTIN) tablet 0.5 mg, BID    bisacodyl (DULCOLAX) rectal suppository 10 mg, Daily PRN    calcium carbonate (TUMS) chewable tablet 500 mg, BID  PRN    cloZAPine (CLOZARIL) tablet 275 mg, HS    hydrOXYzine HCL (ATARAX) tablet 50 mg, Q6H PRN Max 4/day **OR** diphenhydrAMINE (BENADRYL) injection 50 mg, Q6H PRN    hydrOXYzine HCL (ATARAX) tablet 50 mg, Q6H PRN Max 4/day **OR** diphenhydrAMINE (BENADRYL) injection 50 mg, Q6H PRN    diphenhydrAMINE-zinc acetate (BENADRYL) 2-0.1 % cream, BID PRN    docusate sodium (COLACE) capsule 100 mg, BID    escitalopram (LEXAPRO) tablet 20 mg, Daily    famotidine (PEPCID) tablet 20 mg, BID PRN    fluticasone (FLONASE) 50 mcg/act nasal spray 1 spray, Daily PRN    haloperidol (HALDOL) tablet 1 mg, Q6H PRN    haloperidol (HALDOL) tablet 2.5 mg, Q4H PRN Max 4/day    haloperidol (HALDOL) tablet 5 mg, Q4H PRN Max 4/day    haloperidol (HALDOL) tablet 5 mg, BID    hydroCHLOROthiazide tablet 12.5 mg, Daily    hydrocortisone 1 % ointment, 4x Daily PRN    hydrOXYzine HCL (ATARAX) tablet 100 mg, Q6H PRN Max 4/day **OR** LORazepam (ATIVAN) injection 2 mg, Q6H PRN    hydrOXYzine HCL (ATARAX) tablet 100 mg, Q6H PRN Max 4/day **OR** LORazepam (ATIVAN) injection 2 mg, Q6H PRN    hydrOXYzine HCL (ATARAX) tablet 25 mg, Q6H PRN Max 4/day    ibuprofen (MOTRIN) tablet 600 mg, Q8H PRN    influenza vaccine (PF) (Fluarix) IM injection 0.5 mL, Once    loratadine (CLARITIN) tablet 10 mg, Daily    magnesium hydroxide (MILK OF MAGNESIA) oral suspension 30 mL, Once    melatonin tablet 3 mg, HS PRN    melatonin tablet 9 mg, HS    methocarbamol (ROBAXIN) tablet 500 mg, Q6H PRN    multivitamin-minerals (CENTRUM) tablet 1 tablet, Daily    nicotine polacrilex (NICORETTE) gum 2 mg, Q4H PRN    OLANZapine (ZyPREXA) tablet 5 mg, Q4H PRN Max 3/day **OR** OLANZapine (ZyPREXA) IM injection 2.5 mg, Q4H PRN Max 3/day    OLANZapine (ZyPREXA) tablet 5 mg, Q3H PRN Max 3/day **OR** OLANZapine (ZyPREXA) IM injection 5 mg, Q3H PRN Max 3/day    OLANZapine (ZyPREXA) tablet 2.5 mg, Q4H PRN Max 6/day    ondansetron (ZOFRAN-ODT) dispersible tablet 4 mg, Q6H PRN    pantoprazole  (PROTONIX) EC tablet 20 mg, QAM    polyethylene glycol (MIRALAX) packet 17 g, Daily PRN    polyethylene glycol (MIRALAX) packet 17 g, Daily    propranolol (INDERAL) tablet 10 mg, Q12H KAYLIE    saliva substitute (MOUTH KOTE) mucosal solution 5 spray, 4x Daily PRN    senna-docusate sodium (SENOKOT S) 8.6-50 mg per tablet 1 tablet, Daily PRN    senna-docusate sodium (SENOKOT S) 8.6-50 mg per tablet 2 tablet, HS    traZODone (DESYREL) tablet 50 mg, HS PRN    white petrolatum-mineral oil (EUCERIN,HYDROCERIN) cream, TID PRN.    Risks, benefits and possible side effects of Medications:   Risks, benefits, and possible side effects of medications explained to patient and patient verbalizes understanding and agreement for treatment.  Mental Status Evaluation:  Appearance:  age appropriate, bearded, casually dressed, and disheveled   Behavior:  psychomotor retardation   Speech:  soft and scant   Mood:  anxious and depressed   Affect:  blunted and mood-congruent   Language sparse   Thought Process:  concrete   Thought Content:  Appears paranoid    Perceptual Disturbances: Auditory hallucinations without commands   Risk Potential: Suicidal Ideations none, Homicidal Ideations none, and Potential for Aggression No   Sensorium:  person, place, and time/date   Cognition:  recent and remote memory grossly intact   Consciousness:  alert and awake    Attention: attention span appeared shorter than expected for age   Insight:  limited   Judgment: limited   Intellect Not assessed   Gait/Station: normal gait/station and normal balance   Motor Activity: no abnormal movements     Memory: Short and long term memory  fair     Progress Toward Goals: unchanged, as evidenced by their participation (or lack thereof) in individual, social and therapeutic milieu in addition to adherence to their medication regimen.    Recommended Treatment:   See above for assessment and plan.    Inpatient Psychiatric Certification: Based upon physical, mental and  social evaluations, I certify that inpatient psychiatric services are medically necessary for this patient for a duration of greater than 2 midnights for the treatment of Schizoaffective disorder, bipolar type (HCC) including psychotropic medication management, participation in the therapeutic milieu and referrals as indicated. Available alternative community resources do not meet the patient's mental health care needs. I further attest that an established written individualized plan of care has been implemented and is outlined in the patient's medical records.    Counseling/Coordination of Care    I have expended greater than 15 minutes in which more than 50% of this time was expended in counseling/coordination of patient care relating to diagnostic results, prognosis, potential risks and benefits of management options, instructions for appropriate management, patient and/or collateral education, importance of adherence to management and/or risk factor reductions. Patient's rights, confidentiality, exceptions to confidentiality, use of electronic medical record including appropriate staff access to medical record regarding behavioral health services and consent to treatment were reviewed.    Note Share:     This note was not shared with the patient due to reasonable likelihood of causing patient harm     This note has been constructed using a voice recognition system. There may be translation, syntax,  or grammatical errors. If you have any questions, please contact the dictating provider.    Jordan Christopher Holter, DO

## 2025-03-08 PROCEDURE — 99232 SBSQ HOSP IP/OBS MODERATE 35: CPT

## 2025-03-08 RX ADMIN — HALOPERIDOL 5 MG: 5 TABLET ORAL at 08:34

## 2025-03-08 RX ADMIN — HALOPERIDOL 5 MG: 5 TABLET ORAL at 17:08

## 2025-03-08 RX ADMIN — PROPRANOLOL HYDROCHLORIDE 10 MG: 10 TABLET ORAL at 21:13

## 2025-03-08 RX ADMIN — PROPRANOLOL HYDROCHLORIDE 10 MG: 10 TABLET ORAL at 08:34

## 2025-03-08 RX ADMIN — HYDROCHLOROTHIAZIDE 12.5 MG: 12.5 TABLET ORAL at 08:34

## 2025-03-08 RX ADMIN — PANTOPRAZOLE SODIUM 20 MG: 20 TABLET, DELAYED RELEASE ORAL at 08:34

## 2025-03-08 RX ADMIN — NICOTINE POLACRILEX 2 MG: 2 GUM, CHEWING ORAL at 21:13

## 2025-03-08 RX ADMIN — LORATADINE 10 MG: 10 TABLET ORAL at 08:34

## 2025-03-08 RX ADMIN — AMLODIPINE BESYLATE 5 MG: 5 TABLET ORAL at 08:34

## 2025-03-08 RX ADMIN — BENZTROPINE MESYLATE 0.5 MG: 0.5 TABLET ORAL at 08:34

## 2025-03-08 RX ADMIN — CLOZAPINE 275 MG: 25 TABLET ORAL at 21:13

## 2025-03-08 RX ADMIN — DOCUSATE SODIUM 100 MG: 100 CAPSULE, LIQUID FILLED ORAL at 17:08

## 2025-03-08 RX ADMIN — SENNOSIDES AND DOCUSATE SODIUM 2 TABLET: 50; 8.6 TABLET ORAL at 21:13

## 2025-03-08 RX ADMIN — NICOTINE POLACRILEX 2 MG: 2 GUM, CHEWING ORAL at 17:17

## 2025-03-08 RX ADMIN — BENZTROPINE MESYLATE 0.5 MG: 0.5 TABLET ORAL at 17:08

## 2025-03-08 RX ADMIN — MULTIPLE VITAMINS W/ MINERALS TAB 1 TABLET: TAB ORAL at 08:34

## 2025-03-08 RX ADMIN — ESCITALOPRAM OXALATE 20 MG: 10 TABLET, FILM COATED ORAL at 08:34

## 2025-03-08 RX ADMIN — MELATONIN TAB 3 MG 9 MG: 3 TAB at 21:13

## 2025-03-08 RX ADMIN — NICOTINE POLACRILEX 2 MG: 2 GUM, CHEWING ORAL at 08:34

## 2025-03-08 RX ADMIN — DOCUSATE SODIUM 100 MG: 100 CAPSULE, LIQUID FILLED ORAL at 08:34

## 2025-03-08 NOTE — PLAN OF CARE
Problem: Alteration in Thoughts and Perception  Goal: Treatment Goal: Gain control of psychotic behaviors/thinking, reduce/eliminate presenting symptoms and demonstrate improved reality functioning upon discharge  Outcome: Progressing  Goal: Refrain from acting on delusional thinking/internal stimuli  Description: Interventions:  - Monitor patient closely, per order   - Utilize least restrictive measures   - Set reasonable limits, give positive feedback for acceptable   - Administer medications as ordered and monitor of potential side effects  Outcome: Progressing  Goal: Agree to be compliant with medication regime, as prescribed and report medication side effects  Description: Interventions:  - Offer appropriate PRN medication and supervise ingestion; conduct AIMS, as needed   Outcome: Progressing  Goal: Recognize dysfunctional thoughts, communicate reality-based thoughts at the time of discharge  Description: Interventions:  - Provide medication and psycho-education to assist patient in compliance and developing insight into his/her illness   Outcome: Progressing     Problem: Ineffective Coping  Goal: Patient/Family participate in treatment and DC plans  Description: Interventions:  - Provide therapeutic environment  Outcome: Progressing  Goal: Patient/Family verbalizes awareness of resources  Outcome: Progressing     Problem: Depression  Goal: Treatment Goal: Demonstrate behavioral control of depressive symptoms, verbalize feelings of improved mood/affect, and adopt new coping skills prior to discharge  Outcome: Progressing  Goal: Verbalize thoughts and feelings  Description: Interventions:  - Assess and re-assess patient's level of risk   - Engage patient in 1:1 interactions, daily, for a minimum of 15 minutes   - Encourage patient to express feelings, fears, frustrations, hopes   Outcome: Progressing  Goal: Refrain from harming self  Description: Interventions:  - Monitor patient closely, per order   -  Supervise medication ingestion, monitor effects and side effects   Outcome: Progressing  Goal: Refrain from isolation  Description: Interventions:  - Develop a trusting relationship   - Encourage socialization   Outcome: Progressing  Goal: Refrain from self-neglect  Outcome: Progressing     Problem: Anxiety  Goal: Anxiety is at manageable level  Description: Interventions:  - Assess and monitor patient's anxiety level.   - Monitor for signs and symptoms (heart palpitations, chest pain, shortness of breath, headaches, nausea, feeling jumpy, restlessness, irritable, apprehensive).   - Collaborate with interdisciplinary team and initiate plan and interventions as ordered.  - [place] patient to unit/surroundings  - Explain treatment plan  - Encourage participation in care  - Encourage verbalization of concerns/fears  - Identify coping mechanisms  - Assist in developing anxiety-reducing skills  - Administer/offer alternative therapies  - Limit or eliminate stimulants  Outcome: Progressing     Problem: Alteration in Orientation  Goal: Treatment Goal: Demonstrate a reduction of confusion and improved orientation to person, place, time and/or situation upon discharge, according to optimum baseline/ability  Outcome: Progressing  Goal: Interact with staff daily  Description: Interventions:  - Assess and re-assess patient's level of orientation  - Engage patient in 1 on 1 interactions, daily, for a minimum of 15 minutes   - Establish rapport/trust with patient   Outcome: Progressing  Goal: Express concerns related to confused thinking related to:  Description: Interventions:  - Encourage patient to express feelings, fears, frustrations, hopes  - Assign consistent caregivers   - [place]/re-orient patient as needed  - Allow comfort items, as appropriate  - Provide visual cues, signs, etc.   Outcome: Progressing  Goal: Allow medical examinations, as recommended  Description: Interventions:  - Provide physical/neurological exams  and/or referrals, per provider   Outcome: Progressing  Goal: Cooperate with recommended testing/procedures  Description: Interventions:  - Determine need for ancillary testing  - Observe for mental status changes  - Implement falls/precaution protocol   Outcome: Progressing  Goal: Complete daily ADLs, including personal hygiene independently, as able  Description: Interventions:  - Observe, teach, and assist patient with ADLS  Outcome: Progressing     Problem: DISCHARGE PLANNING  Goal: Discharge to home with appropriate resources  Description: INTERVENTIONS:  - Identify barriers to discharge w/patient and caregiver  - Arrange for needed discharge resources and transportation as appropriate  - Identify discharge learning needs (meds, wound care, etc.)  - Refer to Case Management Department for coordinating discharge planning if the patient needs post-hospital services based on physician/advanced practitioner order or complex needs related to functional status, cognitive ability, or social support system  Outcome: Progressing

## 2025-03-08 NOTE — ASSESSMENT & PLAN NOTE
Continue psychotropic medications including:   Continue Clozapine 275 mg nightly to confer mood stability in addition to addressing psychosis.   03/04: , BNP 44, CRP 7.2, ANC 4.08  CBC w/diff and CK every 2 weeks  Continue Cogentin 0.5 mg twice daily for EPS signs/symptoms.   Continue Haldol 5 mg twice daily to address psychosis   Continue Lexapro 20 mg daily to address depression and anxiety  Continue Propanolol 10 mg twice daily to address anxiety   Continue Melatonin 9 mg to address insomnia  On 2 antipsychotics due to subtherapeutic effects with monotherapy  Continue discharge planning: Patient pending ACT team linkage for discharge to his aunt's residence once MA funding comes through from the [facility].      No associated orders from this encounter found during lookback period of 72 hours.

## 2025-03-08 NOTE — NURSING NOTE
"Patient  is cooperative. He is compliant with medications. He is social and interactive with select peers and staff. He uses the gym for coping skills. He states \"I am anxious about going to live with my aunt.\" Patient required no prn medication. Patient refuse to eat lunch because he didn't like the house tray. Patient did not get up in the morning to order his menu and did not get his vitals. Vitals were obtained and charted for the afternoon.   "

## 2025-03-08 NOTE — NURSING NOTE
Pt is visible on the unit and social with select peers. Refused dinner. Took medications without incidence. Pt is pleasant and cooperative. Attended 8/10 groups. Reports anxiety, depression, and AHs that are threatening to hurt him and his family. Pt reports some paranoia related to discharge and managing his AHs but pt provided encouragement and reassurance. Pt has bright affect, smiling on approach. Denies SI/HI/VH. Pt reports he will share the news of his discharge with his peers at a later date. No behavioral issues. Pt offers no complaints.

## 2025-03-08 NOTE — PROGRESS NOTES
Progress Note - Behavioral Health   Name: Alberto Berumen 29 y.o. male I MRN: [id number]  Unit/Bed#: [facility] 112-02 I Date of Admission: 3/29/2024   Date of Service: 3/8/2025 I Hospital Day: 344     Assessment & Plan  Schizoaffective disorder, bipolar type (HCC)    Continue psychotropic medications including:   Continue Clozapine 275 mg nightly to confer mood stability in addition to addressing psychosis.   03/04: , BNP 44, CRP 7.2, ANC 4.08  CBC w/diff and CK every 2 weeks  Continue Cogentin 0.5 mg twice daily for EPS signs/symptoms.   Continue Haldol 5 mg twice daily to address psychosis   Continue Lexapro 20 mg daily to address depression and anxiety  Continue Propanolol 10 mg twice daily to address anxiety   Continue Melatonin 9 mg to address insomnia  On 2 antipsychotics due to subtherapeutic effects with monotherapy  Continue discharge planning: Patient pending ACT team linkage for discharge to his aunt's residence once MA funding comes through from the [facility].      No associated orders from this encounter found during lookback period of 72 hours.    Chronic idiopathic constipation  Evaluated 3/5/25, continue Senokot 2 tablets at bedtime, MiraLAX 17 g daily, and Colace twice daily      No associated orders from this encounter found during lookback period of 72 hours.  Tobacco abuse  Evaluated 3/5/25, continue nicotine gum and continue supportive therapy including promotion of smoking cessation        No associated orders from this encounter found during lookback period of 72 hours.  Class 2 obesity in adult  Evaluated 3/5/25  most recent weight 242 pounds on March 2, 2025, continue dietary management        No associated orders from this encounter found during lookback period of 72 hours.    Primary hypertension  Evaluated 3/5/25, continue Norvasc 5 mg daily and hydrochlorothiazide 12.5 mg daily      No associated orders from this encounter found during lookback period of 72 hours.  GERD  (gastroesophageal reflux disease)  Continue medical management      No associated orders from this encounter found during lookback period of 72 hours.  Elevated hemoglobin A1c  Continue medical management      No associated orders from this encounter found during lookback period of 72 hours.    Current medications:  Current Facility-Administered Medications   Medication Dose Route Frequency Provider Last Rate    acetaminophen  650 mg Oral Q4H PRN Jordan C Holter, DO      acetaminophen  650 mg Oral Q6H PRN HOLLI Lion      aluminum-magnesium hydroxide-simethicone  30 mL Oral Q4H PRN Jordan C Holter,       amLODIPine  5 mg Oral Daily HOLLI Lion      Artificial Tears  1 drop Both Eyes Q3H PRN Jordan C Holter,       haloperidol lactate  2.5 mg Intramuscular Q4H PRN Max 4/day HOLLI Lion      And    LORazepam  1 mg Intramuscular Q4H PRN Max 4/day HOLLI Lion      And    benztropine  0.5 mg Intramuscular Q4H PRN Max 4/day HOLLI Lion      haloperidol lactate  5 mg Intramuscular Q4H PRN Max 4/day HOLLI Lion      And    LORazepam  2 mg Intramuscular Q4H PRN Max 4/day HOLLI Lion      And    benztropine  1 mg Intramuscular Q4H PRN Max 4/day HOLLI Lion      benztropine  0.5 mg Oral BID Jordan C Holter, DO      bisacodyl  10 mg Rectal Daily PRN HOLLI Lion      calcium carbonate  500 mg Oral BID PRN HOLLI Monge      cloZAPine  275 mg Oral HS Jordan C Holter, DO      hydrOXYzine HCL  50 mg Oral Q6H PRN Max 4/day Jordan C Holter, DO      Or    diphenhydrAMINE  50 mg Intramuscular Q6H PRN Jordan C Holter, DO      hydrOXYzine HCL  50 mg Oral Q6H PRN Max 4/day HOLLI Lion      Or    diphenhydrAMINE  50 mg Intramuscular Q6H PRN HOLLI Lion      diphenhydrAMINE-zinc acetate   Topical BID PRN HOLLI Lion      docusate sodium  100 mg Oral BID Jourdan Dickens, DO      escitalopram  20 mg Oral Daily HOLLI Lion       famotidine  20 mg Oral BID PRN HOLLI Monge      fluticasone  1 spray Each Nare Daily PRN HOLLI Monge      haloperidol  1 mg Oral Q6H PRN HOLLI Lion      haloperidol  2.5 mg Oral Q4H PRN Max 4/day HOLLI Lion      haloperidol  5 mg Oral Q4H PRN Max 4/day HOLLI Lion      haloperidol  5 mg Oral BID [facility], DO      hydroCHLOROthiazide  12.5 mg Oral Daily Pia Mantilla, HOLLI      hydrocortisone   Topical 4x Daily PRN HOLLI Lion      hydrOXYzine HCL  100 mg Oral Q6H PRN Max 4/day [facility] Holter, DO      Or    LORazepam  2 mg Intramuscular Q6H PRN [facility] Holter, DO      hydrOXYzine HCL  100 mg Oral Q6H PRN Max 4/day HOLLI Lion      Or    LORazepam  2 mg Intramuscular Q6H PRN HOLLI Lion      hydrOXYzine HCL  25 mg Oral Q6H PRN Max 4/day [facility] Holter, DO      ibuprofen  600 mg Oral Q8H PRN HOLLI Lion      influenza vaccine  0.5 mL Intramuscular Once Bora Rosario MD      loratadine  10 mg Oral Daily Brina Guillen MD      magnesium hydroxide  30 mL Oral Once Jourdan Dickens, DO      melatonin  3 mg Oral HS PRN Bora Rosario MD      melatonin  9 mg Oral HS Bora Rosario MD      methocarbamol  500 mg Oral Q6H PRN HOLLI Lion      multivitamin-minerals  1 tablet Oral Daily HOLLI Monge      nicotine polacrilex  2 mg Oral Q4H PRN Bora Rosario MD      OLANZapine  5 mg Oral Q4H PRN Max 3/day [facility] Holter, DO      Or    OLANZapine  2.5 mg Intramuscular Q4H PRN Max 3/day [facility] Holter, DO      OLANZapine  5 mg Oral Q3H PRN Max 3/day [facility] Holter, DO      Or    OLANZapine  5 mg Intramuscular Q3H PRN Max 3/day [facility] Holter, DO      OLANZapine  2.5 mg Oral Q4H PRN Max 6/day [facility] Holter, DO      ondansetron  4 mg Oral Q6H PRN HOLLI Lion      pantoprazole  20 mg Oral QAM Ion C Holter, DO      polyethylene glycol  17 g Oral Daily PRN HOLLI Ghotra      polyethylene glycol  17  g Oral Daily Jourdan Dickens, DO      propranolol  10 mg Oral Q12H [facility] HOLLI Lion      saliva substitute  5 spray Mouth/Throat 4x Daily PRN Mary Jo Reich PA-C      senna-docusate sodium  1 tablet Oral Daily PRN Jordan C Holter,       senna-docusate sodium  2 tablet Oral HS Jourdan Dickens, DO      traZODone  50 mg Oral HS PRN HOLLI Lion      white petrolatum-mineral oil   Topical TID PRN HOLLI Lion          Risks/Benefits of Treatment:     Risks, benefits, and possible side effects of medications explained to patient and patient verbalizes understanding and agreement for treatment.    Progress Toward Goals: progressing gradually    Treatment Planning:     All current active medications have been reviewed.  Continue to monitor response to treatment and assess for potential side effects of medications.  Encourage group therapy, milieu therapy and occupational therapy  Collaboration with medical service for medical comorbidities as indicated.  Behavioral Health checks for safety monitoring.  Continue discussion with Case Management to assist with obtaining collateral information as indicated, disposition planning and the implementation of patient-centered individualized plan of care.  Estimated Discharge Day: 3/14/2025       Subjective     Behavior over the last 24 hours: unchanged.    Per nursing, Alberto is intermittently visible on the unit and is social with select peers.  He attends most groups.  He is medication compliant however did refuse MiraLAX yesterday.  Alberto ate 100% of his lunch yesterday but refused breakfast and dinner.  He is behaviorally appropriate at this time.  PRNs required in the last 24 hours: Nicorette gum 2 mg 4 times on March 7.    He was calm and cooperative with assessment.  He he reported 5 out of 10 anxiety and depression symptoms.  He was unable to identify specific triggers related to the anxiety and depression.  He denied suicidal or homicidal  ideation, plan, or intent.  He reported auditory hallucinations of voices telling him that they are going to kill him or hurt him.  Alberto reports these voices are of people he knows and sometimes people he does not know.  He continues to find them distressing at times but overall feels that he is able to ignore them and manage the hallucinations.  He denied any visual hallucinations at this time.  Alberto reports adequate sleep.    Sleep: normal  Appetite: poor  Medication side effects: No  ROS: review of systems as noted above in HPI/Subjective report, all other systems are negative    Objective :  Temp:  [97.5 °F (36.4 °C)] 97.5 °F (36.4 °C)  HR:  [] 96  BP: (123-124)/(68-72) 123/68  Resp:  [16] 16  SpO2:  [98 %] 98 %  O2 Device: None (Room air)    Temp:  [97.5 °F (36.4 °C)] 97.5 °F (36.4 °C)  HR:  [] 96  BP: (123-124)/(68-72) 123/68  Resp:  [16] 16  SpO2:  [98 %] 98 %  O2 Device: None (Room air)    Mental Status Evaluation:    Appearance:  casually dressed, marginal hygiene   Behavior:  pleasant, cooperative, calm   Speech:  normal rate and volume   Mood:  depressed, anxious   Affect:  constricted   Thought Process:  goal directed, linear   Thought Content:  negative thinking   Perceptual Disturbances: Reports auditory hallucinations of voices telling him that they are going to hurt him or kill him.  Denied visual hallucinations   Risk Potential: Suicidal Ideation -  None at present  Homicidal Ideation -  None at present  Potential for Aggression - Not at present   Sensorium:  oriented to person, place, and time/date   Memory:  recent and remote memory grossly intact   Consciousness:  alert and awake   Attention/Concentration: attention span and concentration are age appropriate   Insight:  partial   Judgment: partial   Gait/Station: in bed   Motor Activity: no abnormal movements       Lab Results: I have reviewed the following results:  Results from the past 24 hours: No results found for this or  any previous visit (from the past 24 hours).    Administrative Statements     Counseling / Coordination of Care:  I have spent a total time of 15 minutes in caring for this patient on the day of the visit/encounter including:  Patient's progress discussed with staff in treatment team meeting.  Medication changes reviewed with staff in treatment team meeting.  Medications, treatment progress and treatment plan reviewed with patient.  Importance of medication and treatment compliance reviewed with patient.  Reassurance and supportive therapy provided.  Encouraged participation in milieu and group therapy on the unit..    HOLLI Salmeron 03/08/25

## 2025-03-09 PROCEDURE — 99232 SBSQ HOSP IP/OBS MODERATE 35: CPT

## 2025-03-09 RX ADMIN — DOCUSATE SODIUM 100 MG: 100 CAPSULE, LIQUID FILLED ORAL at 08:41

## 2025-03-09 RX ADMIN — PROPRANOLOL HYDROCHLORIDE 10 MG: 10 TABLET ORAL at 21:12

## 2025-03-09 RX ADMIN — MULTIPLE VITAMINS W/ MINERALS TAB 1 TABLET: TAB ORAL at 08:42

## 2025-03-09 RX ADMIN — DOCUSATE SODIUM 100 MG: 100 CAPSULE, LIQUID FILLED ORAL at 17:15

## 2025-03-09 RX ADMIN — BENZTROPINE MESYLATE 0.5 MG: 0.5 TABLET ORAL at 08:42

## 2025-03-09 RX ADMIN — SENNOSIDES AND DOCUSATE SODIUM 2 TABLET: 50; 8.6 TABLET ORAL at 21:13

## 2025-03-09 RX ADMIN — HALOPERIDOL 5 MG: 5 TABLET ORAL at 17:16

## 2025-03-09 RX ADMIN — ESCITALOPRAM OXALATE 20 MG: 10 TABLET, FILM COATED ORAL at 08:41

## 2025-03-09 RX ADMIN — LORATADINE 10 MG: 10 TABLET ORAL at 08:41

## 2025-03-09 RX ADMIN — CLOZAPINE 275 MG: 25 TABLET ORAL at 21:12

## 2025-03-09 RX ADMIN — BENZTROPINE MESYLATE 0.5 MG: 0.5 TABLET ORAL at 17:15

## 2025-03-09 RX ADMIN — NICOTINE POLACRILEX 2 MG: 2 GUM, CHEWING ORAL at 17:16

## 2025-03-09 RX ADMIN — PANTOPRAZOLE SODIUM 20 MG: 20 TABLET, DELAYED RELEASE ORAL at 08:40

## 2025-03-09 RX ADMIN — HALOPERIDOL 5 MG: 5 TABLET ORAL at 08:42

## 2025-03-09 RX ADMIN — PROPRANOLOL HYDROCHLORIDE 10 MG: 10 TABLET ORAL at 08:42

## 2025-03-09 RX ADMIN — HYDROCHLOROTHIAZIDE 12.5 MG: 12.5 TABLET ORAL at 08:41

## 2025-03-09 RX ADMIN — NICOTINE POLACRILEX 2 MG: 2 GUM, CHEWING ORAL at 12:36

## 2025-03-09 RX ADMIN — MELATONIN TAB 3 MG 9 MG: 3 TAB at 21:13

## 2025-03-09 RX ADMIN — NICOTINE POLACRILEX 2 MG: 2 GUM, CHEWING ORAL at 21:12

## 2025-03-09 NOTE — NURSING NOTE
"Patient is visible in the milieu intermittently. He is social with staff and select peers. He completed his weekly assessment. Patient stated \"the hallucinations, depression and anxiety are the same.\" Patient also stated \"I am anxious and paranoid about leaving.\" He is compliant with medications except he refused his miralax. He refused breakfast however ate 100 of lunch. He used the gym for coping skills. Morning dose of Norvasc was held due to not meeting parameters.   "

## 2025-03-09 NOTE — ASSESSMENT & PLAN NOTE
Continue psychotropic medications including:   Continue Clozapine 275 mg nightly to confer mood stability in addition to addressing psychosis.   03/04: , BNP 44, CRP 7.2, ANC 4.08  CBC w/diff and CK every 2 weeks  Continue Cogentin 0.5 mg twice daily for EPS signs/symptoms.   Continue Haldol 5 mg twice daily to address psychosis   Continue Lexapro 20 mg daily to address depression and anxiety  Continue Propanolol 10 mg twice daily to address anxiety   Continue Melatonin 9 mg to address insomnia  On 2 antipsychotics due to subtherapeutic effects with monotherapy  Continue discharge planning: Patient pending ACT team linkage for discharge to his aunt's residence once MA funding comes through from the [facility].      No associated orders from this encounter found during lookback period of 72 hours.

## 2025-03-09 NOTE — PLAN OF CARE
Problem: Alteration in Thoughts and Perception  Goal: Agree to be compliant with medication regime, as prescribed and report medication side effects  Description: Interventions:  - Offer appropriate PRN medication and supervise ingestion; conduct AIMS, as needed   Outcome: Progressing     Problem: Depression  Goal: Refrain from harming self  Description: Interventions:  - Monitor patient closely, per order   - Supervise medication ingestion, monitor effects and side effects   Outcome: Progressing  Goal: Refrain from isolation  Description: Interventions:  - Develop a trusting relationship   - Encourage socialization   Outcome: Progressing  Goal: Refrain from self-neglect  Outcome: Progressing     Problem: Anxiety  Goal: Anxiety is at manageable level  Description: Interventions:  - Assess and monitor patient's anxiety level.   - Monitor for signs and symptoms (heart palpitations, chest pain, shortness of breath, headaches, nausea, feeling jumpy, restlessness, irritable, apprehensive).   - Collaborate with interdisciplinary team and initiate plan and interventions as ordered.  - [place] patient to unit/surroundings  - Explain treatment plan  - Encourage participation in care  - Encourage verbalization of concerns/fears  - Identify coping mechanisms  - Assist in developing anxiety-reducing skills  - Administer/offer alternative therapies  - Limit or eliminate stimulants  Outcome: Progressing     Problem: Alteration in Orientation  Goal: Treatment Goal: Demonstrate a reduction of confusion and improved orientation to person, place, time and/or situation upon discharge, according to optimum baseline/ability  Outcome: Progressing  Goal: Interact with staff daily  Description: Interventions:  - Assess and re-assess patient's level of orientation  - Engage patient in 1 on 1 interactions, daily, for a minimum of 15 minutes   - Establish rapport/trust with patient   Outcome: Progressing  Goal: Cooperate with recommended  testing/procedures  Description: Interventions:  - Determine need for ancillary testing  - Observe for mental status changes  - Implement falls/precaution protocol   Outcome: Progressing     Problem: Alteration in Thoughts and Perception  Goal: Treatment Goal: Gain control of psychotic behaviors/thinking, reduce/eliminate presenting symptoms and demonstrate improved reality functioning upon discharge  Outcome: Not Progressing  Goal: Recognize dysfunctional thoughts, communicate reality-based thoughts at the time of discharge  Description: Interventions:  - Provide medication and psycho-education to assist patient in compliance and developing insight into his/her illness   Outcome: Not Progressing

## 2025-03-09 NOTE — ASSESSMENT & PLAN NOTE
Evaluated 3/9/25, continue Norvasc 5 mg daily and hydrochlorothiazide 12.5 mg daily      No associated orders from this encounter found during lookback period of 72 hours.

## 2025-03-09 NOTE — ASSESSMENT & PLAN NOTE
Evaluated 3/9/25, continue nicotine gum and continue supportive therapy including promotion of smoking cessation        No associated orders from this encounter found during lookback period of 72 hours.

## 2025-03-09 NOTE — ASSESSMENT & PLAN NOTE
Evaluated 3/9/25  most recent weight 242 pounds on March 2, 2025, continue dietary management        No associated orders from this encounter found during lookback period of 72 hours.

## 2025-03-09 NOTE — NURSING NOTE
Maintained on ongoing SAFE precaution without incident on this shift.  Awake, alert,pleasant and cooperative.  Continues to be compliant with medication regimen. Still struggling with the same auditory hallucination,  utilizing proper coping skill.  More visible this evening.  Attended and participated in 2 out of 8 groups. Behavior control.

## 2025-03-09 NOTE — ASSESSMENT & PLAN NOTE
Evaluated 3/9/25, continue Senokot 2 tablets at bedtime, MiraLAX 17 g daily, and Colace twice daily      No associated orders from this encounter found during lookback period of 72 hours.

## 2025-03-09 NOTE — PROGRESS NOTES
Progress Note - Behavioral Health   Name: Alberto Berumen 29 y.o. male I MRN: [id number]  Unit/Bed#: [facility] 112-02 I Date of Admission: 3/29/2024   Date of Service: 3/9/2025 I Hospital Day: 345     Assessment & Plan  Schizoaffective disorder, bipolar type (HCC)    Continue psychotropic medications including:   Continue Clozapine 275 mg nightly to confer mood stability in addition to addressing psychosis.   03/04: , BNP 44, CRP 7.2, ANC 4.08  CBC w/diff and CK every 2 weeks  Continue Cogentin 0.5 mg twice daily for EPS signs/symptoms.   Continue Haldol 5 mg twice daily to address psychosis   Continue Lexapro 20 mg daily to address depression and anxiety  Continue Propanolol 10 mg twice daily to address anxiety   Continue Melatonin 9 mg to address insomnia  On 2 antipsychotics due to subtherapeutic effects with monotherapy  Continue discharge planning: Patient pending ACT team linkage for discharge to his aunt's residence once MA funding comes through from the [facility].      No associated orders from this encounter found during lookback period of 72 hours.    Chronic idiopathic constipation  Evaluated 3/9/25, continue Senokot 2 tablets at bedtime, MiraLAX 17 g daily, and Colace twice daily      No associated orders from this encounter found during lookback period of 72 hours.  Tobacco abuse  Evaluated 3/9/25, continue nicotine gum and continue supportive therapy including promotion of smoking cessation        No associated orders from this encounter found during lookback period of 72 hours.  Class 2 obesity in adult  Evaluated 3/9/25  most recent weight 242 pounds on March 2, 2025, continue dietary management        No associated orders from this encounter found during lookback period of 72 hours.    Primary hypertension  Evaluated 3/9/25, continue Norvasc 5 mg daily and hydrochlorothiazide 12.5 mg daily      No associated orders from this encounter found during lookback period of 72 hours.  GERD  (gastroesophageal reflux disease)  Continue medical management      No associated orders from this encounter found during lookback period of 72 hours.  Elevated hemoglobin A1c  Continue medical management      No associated orders from this encounter found during lookback period of 72 hours.    Current medications:  Current Facility-Administered Medications   Medication Dose Route Frequency Provider Last Rate    acetaminophen  650 mg Oral Q4H PRN Jordan C Holter, DO      acetaminophen  650 mg Oral Q6H PRN HOLLI Lion      aluminum-magnesium hydroxide-simethicone  30 mL Oral Q4H PRN Jordan C Holter,       amLODIPine  5 mg Oral Daily HOLLI Lion      Artificial Tears  1 drop Both Eyes Q3H PRN Jordan C Holter,       haloperidol lactate  2.5 mg Intramuscular Q4H PRN Max 4/day HOLLI Lion      And    LORazepam  1 mg Intramuscular Q4H PRN Max 4/day HOLLI Lion      And    benztropine  0.5 mg Intramuscular Q4H PRN Max 4/day HOLLI Lion      haloperidol lactate  5 mg Intramuscular Q4H PRN Max 4/day HOLLI Lion      And    LORazepam  2 mg Intramuscular Q4H PRN Max 4/day HOLLI Lion      And    benztropine  1 mg Intramuscular Q4H PRN Max 4/day HOLLI Lion      benztropine  0.5 mg Oral BID Jordan C Holter, DO      bisacodyl  10 mg Rectal Daily PRN HOLLI Lion      calcium carbonate  500 mg Oral BID PRN HOLLI Monge      cloZAPine  275 mg Oral HS Jordan C Holter, DO      hydrOXYzine HCL  50 mg Oral Q6H PRN Max 4/day Jordan C Holter, DO      Or    diphenhydrAMINE  50 mg Intramuscular Q6H PRN Jordan C Holter, DO      hydrOXYzine HCL  50 mg Oral Q6H PRN Max 4/day HOLLI Lion      Or    diphenhydrAMINE  50 mg Intramuscular Q6H PRN HOLLI Lion      diphenhydrAMINE-zinc acetate   Topical BID PRN HOLLI Lion      docusate sodium  100 mg Oral BID Jourdan Dickens, DO      escitalopram  20 mg Oral Daily HOLLI Lion       famotidine  20 mg Oral BID PRN HOLLI Monge      fluticasone  1 spray Each Nare Daily PRN HOLLI Monge      haloperidol  1 mg Oral Q6H PRN HOLLI Lion      haloperidol  2.5 mg Oral Q4H PRN Max 4/day HOLLI Lion      haloperidol  5 mg Oral Q4H PRN Max 4/day HOLLI Lion      haloperidol  5 mg Oral BID [facility] Holter, DO      hydroCHLOROthiazide  12.5 mg Oral Daily Pia Mantilla, HOLLI      hydrocortisone   Topical 4x Daily PRN HOLLI Lion      hydrOXYzine HCL  100 mg Oral Q6H PRN Max 4/day [facility] Holter, DO      Or    LORazepam  2 mg Intramuscular Q6H PRN [facility] Holter, DO      hydrOXYzine HCL  100 mg Oral Q6H PRN Max 4/day HOLLI Lion      Or    LORazepam  2 mg Intramuscular Q6H PRN HOLLI Lion      hydrOXYzine HCL  25 mg Oral Q6H PRN Max 4/day [facility] Holter, DO      ibuprofen  600 mg Oral Q8H PRN HOLLI Lion      influenza vaccine  0.5 mL Intramuscular Once Broa Rosario MD      loratadine  10 mg Oral Daily Brina Guillen MD      magnesium hydroxide  30 mL Oral Once Jourdan Dickens, DO      melatonin  3 mg Oral HS PRN Bora Rosario MD      melatonin  9 mg Oral HS Bora Rosario MD      methocarbamol  500 mg Oral Q6H PRN HOLLI Lion      multivitamin-minerals  1 tablet Oral Daily HOLLI Monge      nicotine polacrilex  2 mg Oral Q4H PRN Bora Rosario MD      OLANZapine  5 mg Oral Q4H PRN Max 3/day [facility] Holter, DO      Or    OLANZapine  2.5 mg Intramuscular Q4H PRN Max 3/day [facility] Holter, DO      OLANZapine  5 mg Oral Q3H PRN Max 3/day [facility] Holter, DO      Or    OLANZapine  5 mg Intramuscular Q3H PRN Max 3/day [facility] Holter, DO      OLANZapine  2.5 mg Oral Q4H PRN Max 6/day [facility] Holter, DO      ondansetron  4 mg Oral Q6H PRN HOLLI Lion      pantoprazole  20 mg Oral QAM Ion C Holter, DO      polyethylene glycol  17 g Oral Daily PRN HOLLI Ghotra      polyethylene glycol  17  g Oral Daily Jourdan Dickens, DO      propranolol  10 mg Oral Q12H [facility] HOLLI Lion      saliva substitute  5 spray Mouth/Throat 4x Daily PRN Mary Jo Reich PA-C      senna-docusate sodium  1 tablet Oral Daily PRN Jordan C Holter,       senna-docusate sodium  2 tablet Oral HS Jourdan Dickens, DO      traZODone  50 mg Oral HS PRN HOLLI Lion      white petrolatum-mineral oil   Topical TID PRN HOLLI Lion          Risks/Benefits of Treatment:     Risks, benefits, and possible side effects of medications explained to patient. Patient has limited understanding of risks and benefits of treatment at this time, but agrees to take medications as prescribed.    Progress Toward Goals: progressing gradually    Treatment Planning:     All current active medications have been reviewed.  Continue to monitor response to treatment and assess for potential side effects of medications.  Encourage group therapy, milieu therapy and occupational therapy  Collaboration with medical service for medical comorbidities as indicated.  Behavioral Health checks for safety monitoring.  Continue discussion with Case Management to assist with obtaining collateral information as indicated, disposition planning and the implementation of patient-centered individualized plan of care.  Estimated Discharge Day: 3/14/2025       Subjective     Behavior over the last 24 hours: unchanged.    Per nursing, Alberto was intermittently visible in the milieu and social with select peers.  He attended a few groups.  He was medication and meal compliant, but did refuse lunch because he did not like the house to try.  Patient did not require any as needed's in the last 24 hours.  He remained behaviorally appropriate.    He was calm and cooperative with assessment.  He reported a slight increase in his anxiety and depression symptoms since yesterday.  He is now rating both his anxiety and depression symptoms 6 out of 10.  He continues to  "deny suicidal or homicidal ideation, intent, or plan.  He continues to report auditory hallucinations of voices telling him that they are going to hurt him or kill him.  He denies visual hallucinations.  Alberto reports that these hallucinations have been manageable as he has been using his coping skills to distract himself.  Alberto reports adequate sleep and appetite.  He reports he plans to attend group later on today.  Alberto reports that he is looking forward to discharge to live with his aunt, although admits he is nervous. He denies further questions or concerns.      Sleep: normal  Appetite: fair  Medication side effects: No  ROS: review of systems as noted above in HPI/Subjective report, all other systems are negative    Objective :  Temp:  [97.5 °F (36.4 °C)-97.8 °F (36.6 °C)] 97.8 °F (36.6 °C)  HR:  [80-96] 80  BP: (118-137)/(68-91) 118/74  Resp:  [16-18] 18  SpO2:  [98 %] 98 %  O2 Device: None (Room air)    Temp:  [97.5 °F (36.4 °C)-97.8 °F (36.6 °C)] 97.8 °F (36.6 °C)  HR:  [80-96] 80  BP: (118-137)/(68-91) 118/74  Resp:  [16-18] 18  SpO2:  [98 %] 98 %  O2 Device: None (Room air)    Mental Status Evaluation:    Appearance:  disheveled   Behavior:  cooperative, calm   Speech:  normal rate and volume, scant   Mood:  depressed, anxious   Affect:  constricted   Thought Process:  concrete   Thought Content:  no overt delusions   Perceptual Disturbances: Auditory hallucinations of voices telling him that they will \"hurt him or kill him\".  Denied visual hallucinations   Risk Potential: Suicidal Ideation -  None at present  Homicidal Ideation -  None at present  Potential for Aggression - Not at present   Sensorium:  oriented to person, place, and time/date   Memory:  recent and remote memory grossly intact   Consciousness:  alert and awake   Attention/Concentration: attention span and concentration appear shorter than expected for age   Insight:  partial   Judgment: partial   Gait/Station: in bed   Motor " Activity: no abnormal movements       Lab Results: I have reviewed the following results:  Results from the past 24 hours: No results found for this or any previous visit (from the past 24 hours).    Administrative Statements     Counseling / Coordination of Care:  I have spent a total time of 15 minutes in caring for this patient on the day of the visit/encounter including:  Patient's progress discussed with staff in treatment team meeting.  Medication changes reviewed with staff in treatment team meeting.  Medications, treatment progress and treatment plan reviewed with patient.  Importance of medication and treatment compliance reviewed with patient.  Reassurance and supportive therapy provided.  Encouraged participation in milieu and group therapy on the unit..    HOLLI Salmeron 03/09/25

## 2025-03-10 PROBLEM — T78.40XA ALLERGIES: Status: ACTIVE | Noted: 2025-03-10

## 2025-03-10 PROCEDURE — 99232 SBSQ HOSP IP/OBS MODERATE 35: CPT | Performed by: PSYCHIATRY & NEUROLOGY

## 2025-03-10 RX ORDER — HYDROCHLOROTHIAZIDE 12.5 MG/1
12.5 TABLET ORAL DAILY
Qty: 30 TABLET | Refills: 0 | Status: SHIPPED | OUTPATIENT
Start: 2025-03-11

## 2025-03-10 RX ORDER — PANTOPRAZOLE SODIUM 20 MG/1
20 TABLET, DELAYED RELEASE ORAL EVERY MORNING
Qty: 30 TABLET | Refills: 0 | Status: SHIPPED | OUTPATIENT
Start: 2025-03-11 | End: 2025-04-10

## 2025-03-10 RX ORDER — AMOXICILLIN 250 MG
2 CAPSULE ORAL
Qty: 60 TABLET | Refills: 0 | Status: SHIPPED | OUTPATIENT
Start: 2025-03-10

## 2025-03-10 RX ORDER — HALOPERIDOL 5 MG/1
5 TABLET ORAL 2 TIMES DAILY
Qty: 60 TABLET | Refills: 0 | Status: SHIPPED | OUTPATIENT
Start: 2025-03-10

## 2025-03-10 RX ORDER — ESCITALOPRAM OXALATE 20 MG/1
20 TABLET ORAL DAILY
Qty: 30 TABLET | Refills: 0 | Status: SHIPPED | OUTPATIENT
Start: 2025-03-11

## 2025-03-10 RX ORDER — AMLODIPINE BESYLATE 5 MG/1
5 TABLET ORAL DAILY
Qty: 30 TABLET | Refills: 0 | Status: SHIPPED | OUTPATIENT
Start: 2025-03-11

## 2025-03-10 RX ORDER — DOCUSATE SODIUM 100 MG/1
100 CAPSULE, LIQUID FILLED ORAL 2 TIMES DAILY
Qty: 60 CAPSULE | Refills: 0 | Status: SHIPPED | OUTPATIENT
Start: 2025-03-10

## 2025-03-10 RX ORDER — CLOZAPINE 50 MG/1
275 TABLET ORAL
Qty: 165 TABLET | Refills: 0 | Status: SHIPPED | OUTPATIENT
Start: 2025-03-10

## 2025-03-10 RX ORDER — LORATADINE 10 MG/1
10 TABLET ORAL DAILY
Qty: 30 TABLET | Refills: 0 | Status: SHIPPED | OUTPATIENT
Start: 2025-03-11

## 2025-03-10 RX ORDER — PROPRANOLOL HYDROCHLORIDE 10 MG/1
10 TABLET ORAL EVERY 12 HOURS SCHEDULED
Qty: 60 TABLET | Refills: 0 | Status: SHIPPED | OUTPATIENT
Start: 2025-03-10

## 2025-03-10 RX ADMIN — HALOPERIDOL 5 MG: 5 TABLET ORAL at 08:59

## 2025-03-10 RX ADMIN — SENNOSIDES AND DOCUSATE SODIUM 2 TABLET: 50; 8.6 TABLET ORAL at 21:05

## 2025-03-10 RX ADMIN — ESCITALOPRAM OXALATE 20 MG: 10 TABLET, FILM COATED ORAL at 08:59

## 2025-03-10 RX ADMIN — MELATONIN TAB 3 MG 9 MG: 3 TAB at 21:06

## 2025-03-10 RX ADMIN — AMLODIPINE BESYLATE 5 MG: 5 TABLET ORAL at 08:59

## 2025-03-10 RX ADMIN — DOCUSATE SODIUM 100 MG: 100 CAPSULE, LIQUID FILLED ORAL at 17:03

## 2025-03-10 RX ADMIN — BENZTROPINE MESYLATE 0.5 MG: 0.5 TABLET ORAL at 08:59

## 2025-03-10 RX ADMIN — NICOTINE POLACRILEX 2 MG: 2 GUM, CHEWING ORAL at 17:06

## 2025-03-10 RX ADMIN — BENZTROPINE MESYLATE 0.5 MG: 0.5 TABLET ORAL at 17:04

## 2025-03-10 RX ADMIN — NICOTINE POLACRILEX 2 MG: 2 GUM, CHEWING ORAL at 08:45

## 2025-03-10 RX ADMIN — PANTOPRAZOLE SODIUM 20 MG: 20 TABLET, DELAYED RELEASE ORAL at 08:59

## 2025-03-10 RX ADMIN — NICOTINE POLACRILEX 2 MG: 2 GUM, CHEWING ORAL at 12:26

## 2025-03-10 RX ADMIN — CLOZAPINE 275 MG: 25 TABLET ORAL at 21:05

## 2025-03-10 RX ADMIN — DOCUSATE SODIUM 100 MG: 100 CAPSULE, LIQUID FILLED ORAL at 08:59

## 2025-03-10 RX ADMIN — MULTIPLE VITAMINS W/ MINERALS TAB 1 TABLET: TAB ORAL at 08:59

## 2025-03-10 RX ADMIN — PROPRANOLOL HYDROCHLORIDE 10 MG: 10 TABLET ORAL at 08:59

## 2025-03-10 RX ADMIN — LORATADINE 10 MG: 10 TABLET ORAL at 08:59

## 2025-03-10 RX ADMIN — HALOPERIDOL 5 MG: 5 TABLET ORAL at 17:04

## 2025-03-10 RX ADMIN — HYDROCHLOROTHIAZIDE 12.5 MG: 12.5 TABLET ORAL at 08:59

## 2025-03-10 RX ADMIN — NICOTINE POLACRILEX 2 MG: 2 GUM, CHEWING ORAL at 21:07

## 2025-03-10 NOTE — PROGRESS NOTES
Psychiatric Progress Note - Department of Behavioral Health   Alberto Berumen 29 y.o. male MRN: [id number]  Unit/Bed#: [facility] 112-02 Encounter: [id number]    ASSESSMENT & PLAN     Assessment & Plan  Schizoaffective disorder, bipolar type (HCC)    Continue psychotropic medications including:   Continue Clozapine 275 mg nightly to confer mood stability in addition to addressing psychosis.   03/04: , BNP 44, CRP 7.2, ANC 4.08  CBC w/diff and CK every 2 weeks  Continue Cogentin 0.5 mg twice daily for EPS signs/symptoms.   Continue Haldol 5 mg twice daily to address psychosis   Continue Lexapro 20 mg daily to address depression and anxiety  Continue Propanolol 10 mg twice daily to address anxiety   Continue Melatonin 9 mg to address insomnia  On 2 antipsychotics due to subtherapeutic effects with monotherapy  Continue discharge planning: Patient pending ACT team linkage for discharge to his aunt's residence once MA funding comes through from the [facility].      No associated orders from this encounter found during lookback period of 72 hours.    Chronic idiopathic constipation  Continue Senokot 2 tablets at bedtime, MiraLAX 17 g daily, and Colace twice daily      No associated orders from this encounter found during lookback period of 72 hours.  Tobacco abuse  Continue nicotine gum and continue supportive therapy including promotion of smoking cessation        No associated orders from this encounter found during lookback period of 72 hours.  Class 2 obesity in adult  Most recent weight 242 pounds on March 2, 2025, continue dietary management        No associated orders from this encounter found during lookback period of 72 hours.    Primary hypertension  Continue Norvasc 5 mg daily and hydrochlorothiazide 12.5 mg daily      No associated orders from this encounter found during lookback period of 72 hours.  GERD (gastroesophageal reflux disease)  Continue medical management      No associated orders  from this encounter found during lookback period of 72 hours.  Elevated hemoglobin A1c  Continue medical management      No associated orders from this encounter found during lookback period of 72 hours.    Treatment Recommendations/Precautions:  Continue to promote patient participation in therapeutic milieu.  Continue medical management per medicine.  Continue previously prescribed psychotropic medication regimen; see below.  Continue behavioral health checks q.15 minutes.   Continue vitals per behavioral health unit protocol.  Discharge date per primary team; 201 commitment status.    SUBJECTIVE     Patient evaluated this a.m. for continuity of care. Patient was discussed at length with nursing and treatment team. Per nursing, patient remains calm, cooperative, present and appropriately interactive in the milieu, adherent to his medications less any acute adverse effects. No acute distress is noted throughout evaluation. Alberto Berumen denies suicidal/homicidal ideation in addition to thoughts of self-injury, receptive to crisis planning provided by this writer, contacting for safety in the inpatient setting, admitting to an ability to appropriately confide in staff including this writer. Patient acknowledges improvement in dysphoric mood including depression and anxiety since linkage with ACT team, anticipating upcoming discharge disposition, admitting to residual auditory hallucinations that are negative and derogatory in content    PSYCHIATRIC REVIEW OF SYSTEMS     Behavior over the last 24 hours:  slowly improving  Sleep: adequate  Appetite: adequate  Medication side effects: none    REVIEW OF SYSTEMS   Review of systems: no complaints    OBJECTIVE     Vital Signs in Past 24 Hours:  Temp:  [97.4 °F (36.3 °C)-97.7 °F (36.5 °C)] 97.4 °F (36.3 °C)  HR:  [74-86] 74  BP: (132-145)/(72-80) 132/72  Resp:  [18] 18  SpO2:  [98 %-100 %] 100 %  O2 Device: None (Room air)    Intake/Output in Past 24 hours:  No  intake/output data recorded.  No intake/output data recorded.        Laboratory Results:  I have personally reviewed all pertinent laboratory/tests results.  Most Recent Labs:   Lab Results   Component Value Date    WBC 8.53 03/04/2025    RBC 5.45 03/04/2025    HGB 12.6 03/04/2025    HCT 42.9 03/04/2025     03/04/2025    RDW 14.1 03/04/2025    NEUTROABS 3.56 03/04/2025    SODIUM 140 10/16/2024    K 3.8 10/16/2024     10/16/2024    CO2 29 10/16/2024    BUN 9 10/16/2024    CREATININE 0.91 10/16/2024    GLUC 94 10/16/2024    GLUF 94 10/16/2024    CALCIUM 9.5 10/16/2024    AST 26 09/30/2024    ALT 42 09/30/2024    ALKPHOS 64 09/30/2024    TP 6.6 09/30/2024    ALB 3.8 09/30/2024    TBILI 0.47 09/30/2024    CHOLESTEROL 156 03/14/2024    HDL 44 03/14/2024    TRIG 133 03/14/2024    LDLCALC 85 03/14/2024    NONHDLC 112 03/14/2024    LITHIUM 0.61 01/09/2024    [id number] 1.062 11/15/2023    HGBA1C 5.0 04/01/2024    EAG 97 04/01/2024       Behavioral Health Medications: all current active meds have been reviewed and current meds:   Current Facility-Administered Medications:     acetaminophen (TYLENOL) tablet 650 mg, Q4H PRN    acetaminophen (TYLENOL) tablet 650 mg, Q6H PRN    aluminum-magnesium hydroxide-simethicone (MAALOX) oral suspension 30 mL, Q4H PRN    amLODIPine (NORVASC) tablet 5 mg, Daily    Artificial Tears ophthalmic solution 1 drop, Q3H PRN    haloperidol lactate (HALDOL) injection 2.5 mg, Q4H PRN Max 4/day **AND** LORazepam (ATIVAN) injection 1 mg, Q4H PRN Max 4/day **AND** benztropine (COGENTIN) injection 0.5 mg, Q4H PRN Max 4/day    haloperidol lactate (HALDOL) injection 5 mg, Q4H PRN Max 4/day **AND** LORazepam (ATIVAN) injection 2 mg, Q4H PRN Max 4/day **AND** benztropine (COGENTIN) injection 1 mg, Q4H PRN Max 4/day    benztropine (COGENTIN) tablet 0.5 mg, BID    bisacodyl (DULCOLAX) rectal suppository 10 mg, Daily PRN    calcium carbonate (TUMS) chewable tablet 500 mg, BID PRN    cloZAPine  (CLOZARIL) tablet 275 mg, HS    hydrOXYzine HCL (ATARAX) tablet 50 mg, Q6H PRN Max 4/day **OR** diphenhydrAMINE (BENADRYL) injection 50 mg, Q6H PRN    hydrOXYzine HCL (ATARAX) tablet 50 mg, Q6H PRN Max 4/day **OR** diphenhydrAMINE (BENADRYL) injection 50 mg, Q6H PRN    diphenhydrAMINE-zinc acetate (BENADRYL) 2-0.1 % cream, BID PRN    docusate sodium (COLACE) capsule 100 mg, BID    escitalopram (LEXAPRO) tablet 20 mg, Daily    famotidine (PEPCID) tablet 20 mg, BID PRN    fluticasone (FLONASE) 50 mcg/act nasal spray 1 spray, Daily PRN    haloperidol (HALDOL) tablet 1 mg, Q6H PRN    haloperidol (HALDOL) tablet 2.5 mg, Q4H PRN Max 4/day    haloperidol (HALDOL) tablet 5 mg, Q4H PRN Max 4/day    haloperidol (HALDOL) tablet 5 mg, BID    hydroCHLOROthiazide tablet 12.5 mg, Daily    hydrocortisone 1 % ointment, 4x Daily PRN    hydrOXYzine HCL (ATARAX) tablet 100 mg, Q6H PRN Max 4/day **OR** LORazepam (ATIVAN) injection 2 mg, Q6H PRN    hydrOXYzine HCL (ATARAX) tablet 100 mg, Q6H PRN Max 4/day **OR** LORazepam (ATIVAN) injection 2 mg, Q6H PRN    hydrOXYzine HCL (ATARAX) tablet 25 mg, Q6H PRN Max 4/day    ibuprofen (MOTRIN) tablet 600 mg, Q8H PRN    influenza vaccine (PF) (Fluarix) IM injection 0.5 mL, Once    loratadine (CLARITIN) tablet 10 mg, Daily    magnesium hydroxide (MILK OF MAGNESIA) oral suspension 30 mL, Once    melatonin tablet 3 mg, HS PRN    melatonin tablet 9 mg, HS    methocarbamol (ROBAXIN) tablet 500 mg, Q6H PRN    multivitamin-minerals (CENTRUM) tablet 1 tablet, Daily    nicotine polacrilex (NICORETTE) gum 2 mg, Q4H PRN    OLANZapine (ZyPREXA) tablet 5 mg, Q4H PRN Max 3/day **OR** OLANZapine (ZyPREXA) IM injection 2.5 mg, Q4H PRN Max 3/day    OLANZapine (ZyPREXA) tablet 5 mg, Q3H PRN Max 3/day **OR** OLANZapine (ZyPREXA) IM injection 5 mg, Q3H PRN Max 3/day    OLANZapine (ZyPREXA) tablet 2.5 mg, Q4H PRN Max 6/day    ondansetron (ZOFRAN-ODT) dispersible tablet 4 mg, Q6H PRN    pantoprazole (PROTONIX) EC  tablet 20 mg, QAM    polyethylene glycol (MIRALAX) packet 17 g, Daily PRN    polyethylene glycol (MIRALAX) packet 17 g, Daily    propranolol (INDERAL) tablet 10 mg, Q12H KAYLIE    saliva substitute (MOUTH KOTE) mucosal solution 5 spray, 4x Daily PRN    senna-docusate sodium (SENOKOT S) 8.6-50 mg per tablet 1 tablet, Daily PRN    senna-docusate sodium (SENOKOT S) 8.6-50 mg per tablet 2 tablet, HS    traZODone (DESYREL) tablet 50 mg, HS PRN    white petrolatum-mineral oil (EUCERIN,HYDROCERIN) cream, TID PRN.    Risks, benefits and possible side effects of Medications:   Risks, benefits, and possible side effects of medications explained to patient and patient verbalizes understanding and agreement for treatment.      Mental Status Evaluation:  Appearance:  age appropriate, casually dressed, and somewhat disheveled   Behavior:  psychomotor retardation   Speech:  soft and scant   Mood:  anxious, depressed, and dysthymic   Affect:  blunted   Language sparse   Thought Process:  goal directed   Thought Content:  Negative thinking at times   Perceptual Disturbances: Auditory hallucinations without commands   Risk Potential: Suicidal Ideations none, Homicidal Ideations none, and Potential for Aggression No   Sensorium:  person, place, and time/date   Cognition:  recent and remote memory grossly intact   Consciousness:  alert and awake    Attention: attention span appeared shorter than expected for age   Insight:  limited   Judgment: limited   Intellect Not assessed   Gait/Station: normal gait/station and normal balance   Motor Activity: no abnormal movements     Memory: Short and long term memory  fair     Progress Toward Goals: unchanged, as evidenced by their participation (or lack thereof) in individual, social and therapeutic milieu in addition to adherence to their medication regimen.    Recommended Treatment:   See above for assessment and plan.    Inpatient Psychiatric Certification: Based upon physical, mental and  social evaluations, I certify that inpatient psychiatric services are medically necessary for this patient for a duration of greater than 2 midnights for the treatment of Schizoaffective disorder, bipolar type (HCC) including psychotropic medication management, participation in the therapeutic milieu and referrals as indicated. Available alternative community resources do not meet the patient's mental health care needs. I further attest that an established written individualized plan of care has been implemented and is outlined in the patient's medical records.    Counseling/Coordination of Care    I have expended greater than 15 minutes in which more than 50% of this time was expended in counseling/coordination of patient care relating to diagnostic results, prognosis, potential risks and benefits of management options, instructions for appropriate management, patient and/or collateral education, importance of adherence to management and/or risk factor reductions. Patient's rights, confidentiality, exceptions to confidentiality, use of electronic medical record including appropriate staff access to medical record regarding behavioral health services and consent to treatment were reviewed.    Note Share:     This note was not shared with the patient due to reasonable likelihood of causing patient harm     This note has been constructed using a voice recognition system. There may be translation, syntax,  or grammatical errors. If you have any questions, please contact the dictating provider.    Jordan Christopher Holter, DO 03/10/25

## 2025-03-10 NOTE — NURSING NOTE
Maintained on ongoing SAFE precaution without incident on this shift.  Awake, alert,pleasant and cooperative.  Continues to be compliant with medication regimen. Brighter upon approach today, remains more visible this evening.  Continues to have some anxiety about discharge.  Attended and participated in 4 out of 7 groups. Behavior control.

## 2025-03-10 NOTE — ASSESSMENT & PLAN NOTE
Continue nicotine gum and continue supportive therapy including promotion of smoking cessation        No associated orders from this encounter found during lookback period of 72 hours.

## 2025-03-10 NOTE — ASSESSMENT & PLAN NOTE
Continue psychotropic medications including:   Continue Clozapine 275 mg nightly to confer mood stability in addition to addressing psychosis.   03/04: , BNP 44, CRP 7.2, ANC 4.08  CBC w/diff and CK every 2 weeks  Continue Cogentin 0.5 mg twice daily for EPS signs/symptoms.   Continue Haldol 5 mg twice daily to address psychosis   Continue Lexapro 20 mg daily to address depression and anxiety  Continue Propanolol 10 mg twice daily to address anxiety   Continue Melatonin 9 mg to address insomnia  On 2 antipsychotics due to subtherapeutic effects with monotherapy  Continue discharge planning: Patient pending ACT team linkage for discharge to his aunt's residence once MA funding comes through from the [facility].      No associated orders from this encounter found during lookback period of 72 hours.

## 2025-03-10 NOTE — ASSESSMENT & PLAN NOTE
Continue Norvasc 5 mg daily and hydrochlorothiazide 12.5 mg daily      No associated orders from this encounter found during lookback period of 72 hours.

## 2025-03-10 NOTE — NURSING NOTE
Pt is pleasant, calm, and cooperative, visible on the unit, social with peers and staff. Pt reports feeling a little anxious about d/c but is happy. Pt continue to report AH, denies SI/HI/VH. Pt is meal and medication compliant, safety checks ongoing.

## 2025-03-10 NOTE — ASSESSMENT & PLAN NOTE
Most recent weight 242 pounds on March 2, 2025, continue dietary management        No associated orders from this encounter found during lookback period of 72 hours.

## 2025-03-10 NOTE — PROGRESS NOTES
03/10/25 0902   Team Meeting   Meeting Type Daily Rounds   Team Members Present   Team Members Present Physician;Nurse;;Other (Discipline and Name)   Physician Team Member Holter,   Nursing Team Member Claudia   Social Work Team Member Jose J ORTEGA   Patient/Family Present   Patient Present No   Patient's Family Present No     Groups Participation  8/10.   Patient's compliant with medications. He's appropriate and engaged in treatment. ACT intake 3/13 at 1030. CSP and D/C 3/14@1000. Social with select peers.

## 2025-03-10 NOTE — ASSESSMENT & PLAN NOTE
Continue Senokot 2 tablets at bedtime, MiraLAX 17 g daily, and Colace twice daily      No associated orders from this encounter found during lookback period of 72 hours.

## 2025-03-10 NOTE — PLAN OF CARE
Problem: Alteration in Thoughts and Perception  Goal: Agree to be compliant with medication regime, as prescribed and report medication side effects  Description: Interventions:  - Offer appropriate PRN medication and supervise ingestion; conduct AIMS, as needed   Outcome: Progressing     Problem: Depression  Goal: Treatment Goal: Demonstrate behavioral control of depressive symptoms, verbalize feelings of improved mood/affect, and adopt new coping skills prior to discharge  Outcome: Progressing  Goal: Verbalize thoughts and feelings  Description: Interventions:  - Assess and re-assess patient's level of risk   - Engage patient in 1:1 interactions, daily, for a minimum of 15 minutes   - Encourage patient to express feelings, fears, frustrations, hopes   Outcome: Progressing  Goal: Refrain from harming self  Description: Interventions:  - Monitor patient closely, per order   - Supervise medication ingestion, monitor effects and side effects   Outcome: Progressing  Goal: Refrain from isolation  Description: Interventions:  - Develop a trusting relationship   - Encourage socialization   Outcome: Progressing  Goal: Refrain from self-neglect  Outcome: Progressing     Problem: Alteration in Orientation  Goal: Interact with staff daily  Description: Interventions:  - Assess and re-assess patient's level of orientation  - Engage patient in 1 on 1 interactions, daily, for a minimum of 15 minutes   - Establish rapport/trust with patient   Outcome: Progressing  Goal: Allow medical examinations, as recommended  Description: Interventions:  - Provide physical/neurological exams and/or referrals, per provider   Outcome: Progressing

## 2025-03-11 PROCEDURE — 99232 SBSQ HOSP IP/OBS MODERATE 35: CPT | Performed by: PSYCHIATRY & NEUROLOGY

## 2025-03-11 RX ADMIN — PROPRANOLOL HYDROCHLORIDE 10 MG: 10 TABLET ORAL at 21:04

## 2025-03-11 RX ADMIN — SENNOSIDES AND DOCUSATE SODIUM 2 TABLET: 50; 8.6 TABLET ORAL at 21:05

## 2025-03-11 RX ADMIN — PROPRANOLOL HYDROCHLORIDE 10 MG: 10 TABLET ORAL at 08:52

## 2025-03-11 RX ADMIN — LORATADINE 10 MG: 10 TABLET ORAL at 08:53

## 2025-03-11 RX ADMIN — PANTOPRAZOLE SODIUM 20 MG: 20 TABLET, DELAYED RELEASE ORAL at 08:53

## 2025-03-11 RX ADMIN — MELATONIN TAB 3 MG 9 MG: 3 TAB at 21:04

## 2025-03-11 RX ADMIN — MULTIPLE VITAMINS W/ MINERALS TAB 1 TABLET: TAB ORAL at 08:53

## 2025-03-11 RX ADMIN — DOCUSATE SODIUM 100 MG: 100 CAPSULE, LIQUID FILLED ORAL at 08:53

## 2025-03-11 RX ADMIN — NICOTINE POLACRILEX 2 MG: 2 GUM, CHEWING ORAL at 17:18

## 2025-03-11 RX ADMIN — AMLODIPINE BESYLATE 5 MG: 5 TABLET ORAL at 08:52

## 2025-03-11 RX ADMIN — ESCITALOPRAM OXALATE 20 MG: 10 TABLET, FILM COATED ORAL at 08:53

## 2025-03-11 RX ADMIN — NICOTINE POLACRILEX 2 MG: 2 GUM, CHEWING ORAL at 12:34

## 2025-03-11 RX ADMIN — NICOTINE POLACRILEX 2 MG: 2 GUM, CHEWING ORAL at 08:52

## 2025-03-11 RX ADMIN — HYDROCHLOROTHIAZIDE 12.5 MG: 12.5 TABLET ORAL at 08:53

## 2025-03-11 RX ADMIN — CLOZAPINE 275 MG: 25 TABLET ORAL at 21:04

## 2025-03-11 RX ADMIN — BENZTROPINE MESYLATE 0.5 MG: 0.5 TABLET ORAL at 08:53

## 2025-03-11 RX ADMIN — HALOPERIDOL 5 MG: 5 TABLET ORAL at 17:18

## 2025-03-11 RX ADMIN — HALOPERIDOL 5 MG: 5 TABLET ORAL at 08:53

## 2025-03-11 RX ADMIN — BENZTROPINE MESYLATE 0.5 MG: 0.5 TABLET ORAL at 17:18

## 2025-03-11 RX ADMIN — DOCUSATE SODIUM 100 MG: 100 CAPSULE, LIQUID FILLED ORAL at 17:18

## 2025-03-11 RX ADMIN — NICOTINE POLACRILEX 2 MG: 2 GUM, CHEWING ORAL at 21:05

## 2025-03-11 NOTE — NURSING NOTE
Patient with no behavioral changes or issues today. Pt reports no s/s to nurse. Pt takes medications and is social with peers. Pt reports being excited for discharge.

## 2025-03-11 NOTE — ASSESSMENT & PLAN NOTE
Continue Senokot 2 tablets at bedtime, MiraLAX 17 g daily, and Colace twice daily      Orders from past 72 hours:    docusate sodium (COLACE) 100 mg capsule; Take 1 capsule (100 mg total) by mouth 2 (two) times a day    senna-docusate sodium (SENOKOT S) 8.6-50 mg per tablet; Take 2 tablets by mouth daily at bedtime

## 2025-03-11 NOTE — PROGRESS NOTES
03/11/25 0813   Team Meeting   Meeting Type Daily Rounds   Team Members Present   Team Members Present Physician;Nurse;;Other (Discipline and Name)   Physician Team Member Holter, Hangey CRNP   Nursing Team Member Claudia   Social Work Team Member Jose J ORTEGA   Other (Discipline and Name) Wang PCM   Patient/Family Present   Patient Present No   Patient's Family Present No     Groups Participation  8/9.   Patient's compliant with medications. ACT intake 3/13, CSP and D/C on 3/14. He's social with his peers. Decreased depression

## 2025-03-11 NOTE — ASSESSMENT & PLAN NOTE
Continue Norvasc 5 mg daily and hydrochlorothiazide 12.5 mg daily      Orders from past 72 hours:    hydroCHLOROthiazide 12.5 mg tablet; Take 1 tablet (12.5 mg total) by mouth daily    amLODIPine (NORVASC) 5 mg tablet; Take 1 tablet (5 mg total) by mouth daily    propranolol (INDERAL) 10 mg tablet; Take 1 tablet (10 mg total) by mouth every 12 (twelve) hours

## 2025-03-11 NOTE — PLAN OF CARE
Problem: Alteration in Thoughts and Perception  Goal: Agree to be compliant with medication regime, as prescribed and report medication side effects  Description: Interventions:  - Offer appropriate PRN medication and supervise ingestion; conduct AIMS, as needed   Outcome: Progressing     Problem: Ineffective Coping  Goal: Demonstrates healthy coping skills  Outcome: Progressing     Problem: Depression  Goal: Refrain from harming self  Description: Interventions:  - Monitor patient closely, per order   - Supervise medication ingestion, monitor effects and side effects   Outcome: Progressing  Goal: Refrain from isolation  Description: Interventions:  - Develop a trusting relationship   - Encourage socialization   Outcome: Progressing  Goal: Refrain from self-neglect  Outcome: Progressing     Problem: Anxiety  Goal: Anxiety is at manageable level  Description: Interventions:  - Assess and monitor patient's anxiety level.   - Monitor for signs and symptoms (heart palpitations, chest pain, shortness of breath, headaches, nausea, feeling jumpy, restlessness, irritable, apprehensive).   - Collaborate with interdisciplinary team and initiate plan and interventions as ordered.  - [place] patient to unit/surroundings  - Explain treatment plan  - Encourage participation in care  - Encourage verbalization of concerns/fears  - Identify coping mechanisms  - Assist in developing anxiety-reducing skills  - Administer/offer alternative therapies  - Limit or eliminate stimulants  Outcome: Progressing     Problem: Alteration in Orientation  Goal: Treatment Goal: Demonstrate a reduction of confusion and improved orientation to person, place, time and/or situation upon discharge, according to optimum baseline/ability  Outcome: Progressing  Goal: Interact with staff daily  Description: Interventions:  - Assess and re-assess patient's level of orientation  - Engage patient in 1 on 1 interactions, daily, for a minimum of 15 minutes   -  Establish rapport/trust with patient   Outcome: Progressing  Goal: Express concerns related to confused thinking related to:  Description: Interventions:  - Encourage patient to express feelings, fears, frustrations, hopes  - Assign consistent caregivers   - Stark/re-orient patient as needed  - Allow comfort items, as appropriate  - Provide visual cues, signs, etc.   Outcome: Progressing  Goal: Allow medical examinations, as recommended  Description: Interventions:  - Provide physical/neurological exams and/or referrals, per provider   Outcome: Progressing  Goal: Cooperate with recommended testing/procedures  Description: Interventions:  - Determine need for ancillary testing  - Observe for mental status changes  - Implement falls/precaution protocol   Outcome: Progressing  Goal: Complete daily ADLs, including personal hygiene independently, as able  Description: Interventions:  - Observe, teach, and assist patient with ADLS  Outcome: Progressing

## 2025-03-11 NOTE — PLAN OF CARE
Problem: Ineffective Coping  Goal: Identifies ineffective coping skills  Outcome: Progressing  Goal: Identifies healthy coping skills  Outcome: Progressing  Goal: Demonstrates healthy coping skills  Outcome: Progressing   He continues to make progress towards the goals previous to discharge.

## 2025-03-11 NOTE — NURSING NOTE
Patient requested Nicorette gum po prn at this time. Patient visible and social all evening. Medication compliant. Behaviors controlled and appropriate. Offered no complaints.

## 2025-03-11 NOTE — ASSESSMENT & PLAN NOTE
Continue medical management      Orders from past 72 hours:    pantoprazole (PROTONIX) 20 mg tablet; Take 1 tablet (20 mg total) by mouth every morning

## 2025-03-11 NOTE — ASSESSMENT & PLAN NOTE
Continue psychotropic medications including:   Continue Clozapine 275 mg nightly to confer mood stability in addition to addressing psychosis.   03/04: , BNP 44, CRP 7.2, ANC 4.08  CBC w/diff and CK every 2 weeks  Continue Cogentin 0.5 mg twice daily for EPS signs/symptoms.   Continue Haldol 5 mg twice daily to address psychosis   Continue Lexapro 20 mg daily to address depression and anxiety  Continue Propanolol 10 mg twice daily to address anxiety   Continue Melatonin 9 mg to address insomnia  On 2 antipsychotics due to subtherapeutic effects with monotherapy  Continue discharge planning: Patient pending ACT team linkage for discharge to his aunt's residence once MA funding comes through from the [facility].      Orders from past 72 hours:    cloZAPine (CLOZARIL) 50 MG tablet; Take 5.5 tablets (275 mg total) by mouth daily at bedtime    haloperidol (HALDOL) 5 mg tablet; Take 1 tablet (5 mg total) by mouth 2 (two) times a day    melatonin 3 mg; Take 3 tablets (9 mg total) by mouth daily at bedtime    escitalopram (LEXAPRO) 20 mg tablet; Take 1 tablet (20 mg total) by mouth daily    propranolol (INDERAL) 10 mg tablet; Take 1 tablet (10 mg total) by mouth every 12 (twelve) hours

## 2025-03-11 NOTE — PROGRESS NOTES
Psychiatric Progress Note - Department of Behavioral Health   Alberto Berumen 29 y.o. male MRN: [id number]  Unit/Bed#: [facility] 112-02 Encounter: [id number]    ASSESSMENT & PLAN     Assessment & Plan  Schizoaffective disorder, bipolar type (HCC)    Continue psychotropic medications including:   Continue Clozapine 275 mg nightly to confer mood stability in addition to addressing psychosis.   03/04: , BNP 44, CRP 7.2, ANC 4.08  CBC w/diff and CK every 2 weeks  Continue Cogentin 0.5 mg twice daily for EPS signs/symptoms.   Continue Haldol 5 mg twice daily to address psychosis   Continue Lexapro 20 mg daily to address depression and anxiety  Continue Propanolol 10 mg twice daily to address anxiety   Continue Melatonin 9 mg to address insomnia  On 2 antipsychotics due to subtherapeutic effects with monotherapy  Continue discharge planning: Patient pending ACT team linkage for discharge to his aunt's residence once MA funding comes through from the [facility].      Orders from past 72 hours:    cloZAPine (CLOZARIL) 50 MG tablet; Take 5.5 tablets (275 mg total) by mouth daily at bedtime    haloperidol (HALDOL) 5 mg tablet; Take 1 tablet (5 mg total) by mouth 2 (two) times a day    melatonin 3 mg; Take 3 tablets (9 mg total) by mouth daily at bedtime    escitalopram (LEXAPRO) 20 mg tablet; Take 1 tablet (20 mg total) by mouth daily    propranolol (INDERAL) 10 mg tablet; Take 1 tablet (10 mg total) by mouth every 12 (twelve) hours    Chronic idiopathic constipation  Continue Senokot 2 tablets at bedtime, MiraLAX 17 g daily, and Colace twice daily      Orders from past 72 hours:    docusate sodium (COLACE) 100 mg capsule; Take 1 capsule (100 mg total) by mouth 2 (two) times a day    senna-docusate sodium (SENOKOT S) 8.6-50 mg per tablet; Take 2 tablets by mouth daily at bedtime    Tobacco abuse  Continue nicotine gum and continue supportive therapy including promotion of smoking cessation        No associated  orders from this encounter found during lookback period of 72 hours.  Class 2 obesity in adult  Most recent weight 242 pounds on March 2, 2025, continue dietary management        No associated orders from this encounter found during lookback period of 72 hours.    Primary hypertension  Continue Norvasc 5 mg daily and hydrochlorothiazide 12.5 mg daily      Orders from past 72 hours:    hydroCHLOROthiazide 12.5 mg tablet; Take 1 tablet (12.5 mg total) by mouth daily    amLODIPine (NORVASC) 5 mg tablet; Take 1 tablet (5 mg total) by mouth daily    propranolol (INDERAL) 10 mg tablet; Take 1 tablet (10 mg total) by mouth every 12 (twelve) hours    GERD (gastroesophageal reflux disease)  Continue medical management      Orders from past 72 hours:    pantoprazole (PROTONIX) 20 mg tablet; Take 1 tablet (20 mg total) by mouth every morning    Elevated hemoglobin A1c  Continue medical management      No associated orders from this encounter found during lookback period of 72 hours.  Allergies    Orders from past 72 hours:    loratadine (CLARITIN) 10 mg tablet; Take 1 tablet (10 mg total) by mouth daily      Treatment Recommendations/Precautions:  Continue to promote patient participation in therapeutic milieu.  Continue medical management per medicine.  Continue previously prescribed psychotropic medication regimen; see below.  Continue behavioral health checks q.15 minutes.   Continue vitals per behavioral health unit protocol.  Discharge date per primary team; 201 commitment status.    SUBJECTIVE     Patient evaluated this a.m. for continuity of care. Patient was discussed at length with nursing and treatment team. Per nursing, patient has been less anxious. No acute distress is noted throughout evaluation. Alberto Berumen denies suicidal/homicidal ideation in addition to thoughts of self-injury, receptive to crisis planning provided by this writer, contacting for safety in the inpatient setting, admitting to an ability to  appropriately confide in staff including this writer. Remembers this writer from several months ago. States he plans to continue going to groups, went to 7/8 groups yesterday. Looking forward to discharge, still hearing voices and these are worst at night when there is less ambient noise but states he is able to ignore them easily, denies CAH, appears only minimally distracted with conversation. Denies drooling and other side effects.     PSYCHIATRIC REVIEW OF SYSTEMS     Behavior over the last 24 hours:  unchanged  Sleep: normal  Appetite:  adequate  Medication side effects: No    REVIEW OF SYSTEMS   Review of systems: no complaints    OBJECTIVE     Vital Signs in Past 24 Hours:  Temp:  [97.9 °F (36.6 °C)] 97.9 °F (36.6 °C)  HR:  [86-97] 97  BP: (105-124)/(55-60) 124/60  Resp:  [18] 18  SpO2:  [98 %] 98 %  O2 Device: None (Room air)    Intake/Output in Past 24 hours:  No intake/output data recorded.  No intake/output data recorded.        Laboratory Results:  I have personally reviewed all pertinent laboratory/tests results.  Most Recent Labs:   Lab Results   Component Value Date    WBC 8.53 03/04/2025    RBC 5.45 03/04/2025    HGB 12.6 03/04/2025    HCT 42.9 03/04/2025     03/04/2025    RDW 14.1 03/04/2025    NEUTROABS 3.56 03/04/2025    SODIUM 140 10/16/2024    K 3.8 10/16/2024     10/16/2024    CO2 29 10/16/2024    BUN 9 10/16/2024    CREATININE 0.91 10/16/2024    GLUC 94 10/16/2024    GLUF 94 10/16/2024    CALCIUM 9.5 10/16/2024    AST 26 09/30/2024    ALT 42 09/30/2024    ALKPHOS 64 09/30/2024    TP 6.6 09/30/2024    ALB 3.8 09/30/2024    TBILI 0.47 09/30/2024    CHOLESTEROL 156 03/14/2024    HDL 44 03/14/2024    TRIG 133 03/14/2024    LDLCALC 85 03/14/2024    NONHDLC 112 03/14/2024    LITHIUM 0.61 01/09/2024    [id number] 1.062 11/15/2023    HGBA1C 5.0 04/01/2024    EAG 97 04/01/2024       Behavioral Health Medications: all current active meds have been reviewed, continue current psychiatric  medications, and current meds:   Current Facility-Administered Medications:     acetaminophen (TYLENOL) tablet 650 mg, Q4H PRN    acetaminophen (TYLENOL) tablet 650 mg, Q6H PRN    aluminum-magnesium hydroxide-simethicone (MAALOX) oral suspension 30 mL, Q4H PRN    amLODIPine (NORVASC) tablet 5 mg, Daily    Artificial Tears ophthalmic solution 1 drop, Q3H PRN    haloperidol lactate (HALDOL) injection 2.5 mg, Q4H PRN Max 4/day **AND** LORazepam (ATIVAN) injection 1 mg, Q4H PRN Max 4/day **AND** benztropine (COGENTIN) injection 0.5 mg, Q4H PRN Max 4/day    haloperidol lactate (HALDOL) injection 5 mg, Q4H PRN Max 4/day **AND** LORazepam (ATIVAN) injection 2 mg, Q4H PRN Max 4/day **AND** benztropine (COGENTIN) injection 1 mg, Q4H PRN Max 4/day    benztropine (COGENTIN) tablet 0.5 mg, BID    bisacodyl (DULCOLAX) rectal suppository 10 mg, Daily PRN    calcium carbonate (TUMS) chewable tablet 500 mg, BID PRN    cloZAPine (CLOZARIL) tablet 275 mg, HS    hydrOXYzine HCL (ATARAX) tablet 50 mg, Q6H PRN Max 4/day **OR** diphenhydrAMINE (BENADRYL) injection 50 mg, Q6H PRN    hydrOXYzine HCL (ATARAX) tablet 50 mg, Q6H PRN Max 4/day **OR** diphenhydrAMINE (BENADRYL) injection 50 mg, Q6H PRN    diphenhydrAMINE-zinc acetate (BENADRYL) 2-0.1 % cream, BID PRN    docusate sodium (COLACE) capsule 100 mg, BID    escitalopram (LEXAPRO) tablet 20 mg, Daily    famotidine (PEPCID) tablet 20 mg, BID PRN    fluticasone (FLONASE) 50 mcg/act nasal spray 1 spray, Daily PRN    haloperidol (HALDOL) tablet 1 mg, Q6H PRN    haloperidol (HALDOL) tablet 2.5 mg, Q4H PRN Max 4/day    haloperidol (HALDOL) tablet 5 mg, Q4H PRN Max 4/day    haloperidol (HALDOL) tablet 5 mg, BID    hydroCHLOROthiazide tablet 12.5 mg, Daily    hydrocortisone 1 % ointment, 4x Daily PRN    hydrOXYzine HCL (ATARAX) tablet 100 mg, Q6H PRN Max 4/day **OR** LORazepam (ATIVAN) injection 2 mg, Q6H PRN    hydrOXYzine HCL (ATARAX) tablet 100 mg, Q6H PRN Max 4/day **OR** LORazepam  (ATIVAN) injection 2 mg, Q6H PRN    hydrOXYzine HCL (ATARAX) tablet 25 mg, Q6H PRN Max 4/day    ibuprofen (MOTRIN) tablet 600 mg, Q8H PRN    influenza vaccine (PF) (Fluarix) IM injection 0.5 mL, Once    loratadine (CLARITIN) tablet 10 mg, Daily    magnesium hydroxide (MILK OF MAGNESIA) oral suspension 30 mL, Once    melatonin tablet 3 mg, HS PRN    melatonin tablet 9 mg, HS    methocarbamol (ROBAXIN) tablet 500 mg, Q6H PRN    multivitamin-minerals (CENTRUM) tablet 1 tablet, Daily    nicotine polacrilex (NICORETTE) gum 2 mg, Q4H PRN    OLANZapine (ZyPREXA) tablet 5 mg, Q4H PRN Max 3/day **OR** OLANZapine (ZyPREXA) IM injection 2.5 mg, Q4H PRN Max 3/day    OLANZapine (ZyPREXA) tablet 5 mg, Q3H PRN Max 3/day **OR** OLANZapine (ZyPREXA) IM injection 5 mg, Q3H PRN Max 3/day    OLANZapine (ZyPREXA) tablet 2.5 mg, Q4H PRN Max 6/day    ondansetron (ZOFRAN-ODT) dispersible tablet 4 mg, Q6H PRN    pantoprazole (PROTONIX) EC tablet 20 mg, QAM    polyethylene glycol (MIRALAX) packet 17 g, Daily PRN    polyethylene glycol (MIRALAX) packet 17 g, Daily    propranolol (INDERAL) tablet 10 mg, Q12H KAYLIE    saliva substitute (MOUTH KOTE) mucosal solution 5 spray, 4x Daily PRN    senna-docusate sodium (SENOKOT S) 8.6-50 mg per tablet 1 tablet, Daily PRN    senna-docusate sodium (SENOKOT S) 8.6-50 mg per tablet 2 tablet, HS    traZODone (DESYREL) tablet 50 mg, HS PRN    white petrolatum-mineral oil (EUCERIN,HYDROCERIN) cream, TID PRN.    Risks, benefits and possible side effects of Medications:   Risks, benefits, and possible side effects of medications explained to patient and patient verbalizes understanding and agreement for treatment.    Dual Antipsychotic Rationale: patient failed multiple monotherapy antipsychotics and is improved on dual therapy     Mental Status Evaluation:  Appearance:  age appropriate, casually dressed, overweight, and good eye contact   Behavior:  psychomotor retardation   Speech:  soft and scant   Mood:   anxious   Affect:  blunted   Language sparse   Thought Process:  goal directed   Thought Content:  Thought paucity   Perceptual Disturbances: Auditory hallucinations without commands, easily ignored   Risk Potential: Suicidal Ideations none, Homicidal Ideations none, and Potential for Aggression No   Sensorium:  person, place, and time/date   Cognition:  recent and remote memory grossly intact   Consciousness:  alert and awake    Attention: attention span appeared shorter than expected for age   Insight:  limited   Judgment: limited   Intellect Not assessed   Gait/Station: normal gait/station and normal balance   Motor Activity: no abnormal movements     Memory: Short and long term memory  intact     Progress Toward Goals: improving, as evidenced by their participation in individual, social and therapeutic milieu in addition to adherence to their medication regimen.  Patient Acceptance: yes  Patient Understanding: improved but limited due to illness  Patient Involvement in Reintegration Process: adequate  Patient's Therapeutic Relationship with Psychiatrist: good  Patient's Optimism Regarding Recovery: moderate optimism    Recommended Treatment:   See above for assessment and plan.    Inpatient Psychiatric Certification: Based upon physical, mental and social evaluations, I certify that inpatient psychiatric services are medically necessary for this patient for a duration of greater than 2 midnights for the treatment of Schizoaffective disorder, bipolar type (HCC) including psychotropic medication management, participation in the therapeutic milieu and referrals as indicated. Available alternative community resources do not meet the patient's mental health care needs. I further attest that an established written individualized plan of care has been implemented and is outlined in the patient's medical records.    Counseling/Coordination of Care    I have expended greater than 15 minutes in which more than 50% of this  time was expended in counseling/coordination of patient care relating to diagnostic results, prognosis, potential risks and benefits of management options, instructions for appropriate management, patient and/or collateral education, importance of adherence to management and/or risk factor reductions. Patient's rights, confidentiality, exceptions to confidentiality, use of electronic medical record including appropriate staff access to medical record regarding behavioral health services and consent to treatment were reviewed.    Note Share:     This note was shared with the patient    This note has been constructed using a voice recognition system. There may be translation, syntax,  or grammatical errors. If you have any questions, please contact the dictating provider.    Harjeet Torres, DO 03/11/25

## 2025-03-11 NOTE — ASSESSMENT & PLAN NOTE
Orders from past 72 hours:    loratadine (CLARITIN) 10 mg tablet; Take 1 tablet (10 mg total) by mouth daily

## 2025-03-12 PROCEDURE — 99232 SBSQ HOSP IP/OBS MODERATE 35: CPT | Performed by: PSYCHIATRY & NEUROLOGY

## 2025-03-12 RX ADMIN — BENZTROPINE MESYLATE 0.5 MG: 0.5 TABLET ORAL at 09:24

## 2025-03-12 RX ADMIN — BENZTROPINE MESYLATE 0.5 MG: 0.5 TABLET ORAL at 17:07

## 2025-03-12 RX ADMIN — PROPRANOLOL HYDROCHLORIDE 10 MG: 10 TABLET ORAL at 21:12

## 2025-03-12 RX ADMIN — CLOZAPINE 275 MG: 25 TABLET ORAL at 21:12

## 2025-03-12 RX ADMIN — ESCITALOPRAM OXALATE 20 MG: 10 TABLET, FILM COATED ORAL at 09:23

## 2025-03-12 RX ADMIN — HALOPERIDOL 5 MG: 5 TABLET ORAL at 09:24

## 2025-03-12 RX ADMIN — DOCUSATE SODIUM 100 MG: 100 CAPSULE, LIQUID FILLED ORAL at 09:24

## 2025-03-12 RX ADMIN — PANTOPRAZOLE SODIUM 20 MG: 20 TABLET, DELAYED RELEASE ORAL at 09:24

## 2025-03-12 RX ADMIN — NICOTINE POLACRILEX 2 MG: 2 GUM, CHEWING ORAL at 21:12

## 2025-03-12 RX ADMIN — AMLODIPINE BESYLATE 5 MG: 5 TABLET ORAL at 09:23

## 2025-03-12 RX ADMIN — NICOTINE POLACRILEX 2 MG: 2 GUM, CHEWING ORAL at 09:23

## 2025-03-12 RX ADMIN — HALOPERIDOL 5 MG: 5 TABLET ORAL at 17:07

## 2025-03-12 RX ADMIN — HYDROCHLOROTHIAZIDE 12.5 MG: 12.5 TABLET ORAL at 09:24

## 2025-03-12 RX ADMIN — DOCUSATE SODIUM 100 MG: 100 CAPSULE, LIQUID FILLED ORAL at 17:07

## 2025-03-12 RX ADMIN — LORATADINE 10 MG: 10 TABLET ORAL at 09:23

## 2025-03-12 RX ADMIN — MELATONIN TAB 3 MG 9 MG: 3 TAB at 21:12

## 2025-03-12 RX ADMIN — PROPRANOLOL HYDROCHLORIDE 10 MG: 10 TABLET ORAL at 09:24

## 2025-03-12 RX ADMIN — MULTIPLE VITAMINS W/ MINERALS TAB 1 TABLET: TAB ORAL at 09:24

## 2025-03-12 RX ADMIN — SENNOSIDES AND DOCUSATE SODIUM 2 TABLET: 50; 8.6 TABLET ORAL at 21:13

## 2025-03-12 RX ADMIN — NICOTINE POLACRILEX 2 MG: 2 GUM, CHEWING ORAL at 17:07

## 2025-03-12 RX ADMIN — NICOTINE POLACRILEX 2 MG: 2 GUM, CHEWING ORAL at 13:30

## 2025-03-12 NOTE — PROGRESS NOTES
03/12/25 0813   Team Meeting   Meeting Type Daily Rounds   Team Members Present   Team Members Present Physician;Nurse;;Other (Discipline and Name)   Physician Team Member Holter, Gauthier   Nursing Team Member Claudia   Social Work Team Member Jose J ORTEGA   Other (Discipline and Name) Wang PCM   Patient/Family Present   Patient Present No   Patient's Family Present No     Groups Participation  10/11.   Patient's compliant with medications. ACT intake tomorrow at 1030. CSP and D/C on 3/14 at 1000.

## 2025-03-12 NOTE — ASSESSMENT & PLAN NOTE
Continue psychotropic medications including:   Continue Clozapine 275 mg nightly to confer mood stability in addition to addressing psychosis.   03/04: , BNP 44, CRP 7.2, ANC 4.08  CBC w/diff and CK every 2 weeks  Continue Cogentin 0.5 mg twice daily for EPS signs/symptoms.   Continue Haldol 5 mg twice daily to address psychosis   Continue Lexapro 20 mg daily to address depression and anxiety  Continue Propanolol 10 mg twice daily to address anxiety   Continue Melatonin 9 mg to address insomnia  On 2 antipsychotics due to subtherapeutic effects with monotherapy  Continue discharge planning: Patient pending ACT team linkage for discharge to his aunt's residence once MA funding comes through from the [facility].      Orders from past 72 hours:    cloZAPine (CLOZARIL) 50 MG tablet; Take 5.5 tablets (275 mg total) by mouth daily at bedtime    haloperidol (HALDOL) 5 mg tablet; Take 1 tablet (5 mg total) by mouth 2 (two) times a day    melatonin 3 mg; Take 3 tablets (9 mg total) by mouth daily at bedtime    escitalopram (LEXAPRO) 20 mg tablet; Take 1 tablet (20 mg total) by mouth daily    propranolol (INDERAL) 10 mg tablet; Take 1 tablet (10 mg total) by mouth every 12 (twelve) hours

## 2025-03-12 NOTE — PROGRESS NOTES
Psychiatric Progress Note - Department of Behavioral Health   Alberto Berumen 29 y.o. male MRN: [id number]  Unit/Bed#: [facility] 112-02 Encounter: [id number]    ASSESSMENT & PLAN     Assessment & Plan  Schizoaffective disorder, bipolar type (HCC)    Continue psychotropic medications including:   Continue Clozapine 275 mg nightly to confer mood stability in addition to addressing psychosis.   03/04: , BNP 44, CRP 7.2, ANC 4.08  CBC w/diff and CK every 2 weeks  Continue Cogentin 0.5 mg twice daily for EPS signs/symptoms.   Continue Haldol 5 mg twice daily to address psychosis   Continue Lexapro 20 mg daily to address depression and anxiety  Continue Propanolol 10 mg twice daily to address anxiety   Continue Melatonin 9 mg to address insomnia  On 2 antipsychotics due to subtherapeutic effects with monotherapy  Continue discharge planning: Patient pending ACT team linkage for discharge to his aunt's residence once MA funding comes through from the [facility].      Orders from past 72 hours:    cloZAPine (CLOZARIL) 50 MG tablet; Take 5.5 tablets (275 mg total) by mouth daily at bedtime    haloperidol (HALDOL) 5 mg tablet; Take 1 tablet (5 mg total) by mouth 2 (two) times a day    melatonin 3 mg; Take 3 tablets (9 mg total) by mouth daily at bedtime    escitalopram (LEXAPRO) 20 mg tablet; Take 1 tablet (20 mg total) by mouth daily    propranolol (INDERAL) 10 mg tablet; Take 1 tablet (10 mg total) by mouth every 12 (twelve) hours    Chronic idiopathic constipation  Continue Senokot 2 tablets at bedtime, MiraLAX 17 g daily, and Colace twice daily      Orders from past 72 hours:    docusate sodium (COLACE) 100 mg capsule; Take 1 capsule (100 mg total) by mouth 2 (two) times a day    senna-docusate sodium (SENOKOT S) 8.6-50 mg per tablet; Take 2 tablets by mouth daily at bedtime    Tobacco abuse  Continue nicotine gum and continue supportive therapy including promotion of smoking cessation        No associated  orders from this encounter found during lookback period of 72 hours.  Class 2 obesity in adult  Most recent weight 242 pounds on March 2, 2025, continue dietary management        No associated orders from this encounter found during lookback period of 72 hours.    Primary hypertension  Continue Norvasc 5 mg daily and hydrochlorothiazide 12.5 mg daily      Orders from past 72 hours:    hydroCHLOROthiazide 12.5 mg tablet; Take 1 tablet (12.5 mg total) by mouth daily    amLODIPine (NORVASC) 5 mg tablet; Take 1 tablet (5 mg total) by mouth daily    propranolol (INDERAL) 10 mg tablet; Take 1 tablet (10 mg total) by mouth every 12 (twelve) hours    GERD (gastroesophageal reflux disease)  Continue medical management      Orders from past 72 hours:    pantoprazole (PROTONIX) 20 mg tablet; Take 1 tablet (20 mg total) by mouth every morning    Elevated hemoglobin A1c  Continue medical management      No associated orders from this encounter found during lookback period of 72 hours.  Allergies    Orders from past 72 hours:    loratadine (CLARITIN) 10 mg tablet; Take 1 tablet (10 mg total) by mouth daily      Treatment Recommendations/Precautions:  Continue to promote patient participation in therapeutic milieu.  Continue medical management per medicine.  Continue previously prescribed psychotropic medication regimen; see below.  Continue behavioral health checks q.15 minutes.   Continue vitals per behavioral health unit protocol.  Discharge date per primary team; 201 commitment status.    SUBJECTIVE     Patient evaluated this a.m. for continuity of care. Patient was discussed at length with nursing and treatment team. Per nursing, patient refused dinner but ate a snack, otherwise no changes, went to 10/11 groups. No acute distress is noted throughout evaluation. Alberto Berumen denies suicidal/homicidal ideation in addition to thoughts of self-injury, receptive to crisis planning provided by this writer, contacting for safety  in the inpatient setting, admitting to an ability to appropriately confide in staff including this writer. Patient states he is looking forward to discharge, and denies side effects from medications. Denies SI, HI, endorses voices but states even when they are threatening at times, he feels confident he can ignore them. Looking forward to joinging and going to a gym upon discharge.    PSYCHIATRIC REVIEW OF SYSTEMS     Behavior over the last 24 hours:  unchanged  Sleep: normal  Appetite: normal  Medication side effects: No    REVIEW OF SYSTEMS   Review of systems: no complaints    OBJECTIVE     Vital Signs in Past 24 Hours:  Temp:  [97.5 °F (36.4 °C)-98 °F (36.7 °C)] 97.5 °F (36.4 °C)  HR:  [] 90  BP: (117-127)/(63-74) 122/63  Resp:  [18] 18  SpO2:  [99 %] 99 %  O2 Device: None (Room air)    Intake/Output in Past 24 hours:  No intake/output data recorded.  No intake/output data recorded.        Laboratory Results:  I have personally reviewed all pertinent laboratory/tests results.  Most Recent Labs:   Lab Results   Component Value Date    WBC 8.53 03/04/2025    RBC 5.45 03/04/2025    HGB 12.6 03/04/2025    HCT 42.9 03/04/2025     03/04/2025    RDW 14.1 03/04/2025    NEUTROABS 3.56 03/04/2025    SODIUM 140 10/16/2024    K 3.8 10/16/2024     10/16/2024    CO2 29 10/16/2024    BUN 9 10/16/2024    CREATININE 0.91 10/16/2024    GLUC 94 10/16/2024    GLUF 94 10/16/2024    CALCIUM 9.5 10/16/2024    AST 26 09/30/2024    ALT 42 09/30/2024    ALKPHOS 64 09/30/2024    TP 6.6 09/30/2024    ALB 3.8 09/30/2024    TBILI 0.47 09/30/2024    CHOLESTEROL 156 03/14/2024    HDL 44 03/14/2024    TRIG 133 03/14/2024    LDLCALC 85 03/14/2024    NONHDLC 112 03/14/2024    LITHIUM 0.61 01/09/2024    [id number] 1.062 11/15/2023    HGBA1C 5.0 04/01/2024    EAG 97 04/01/2024       Behavioral Health Medications: all current active meds have been reviewed, continue current psychiatric medications, and current meds:   Current  Facility-Administered Medications:     acetaminophen (TYLENOL) tablet 650 mg, Q4H PRN    acetaminophen (TYLENOL) tablet 650 mg, Q6H PRN    aluminum-magnesium hydroxide-simethicone (MAALOX) oral suspension 30 mL, Q4H PRN    amLODIPine (NORVASC) tablet 5 mg, Daily    Artificial Tears ophthalmic solution 1 drop, Q3H PRN    haloperidol lactate (HALDOL) injection 2.5 mg, Q4H PRN Max 4/day **AND** LORazepam (ATIVAN) injection 1 mg, Q4H PRN Max 4/day **AND** benztropine (COGENTIN) injection 0.5 mg, Q4H PRN Max 4/day    haloperidol lactate (HALDOL) injection 5 mg, Q4H PRN Max 4/day **AND** LORazepam (ATIVAN) injection 2 mg, Q4H PRN Max 4/day **AND** benztropine (COGENTIN) injection 1 mg, Q4H PRN Max 4/day    benztropine (COGENTIN) tablet 0.5 mg, BID    bisacodyl (DULCOLAX) rectal suppository 10 mg, Daily PRN    calcium carbonate (TUMS) chewable tablet 500 mg, BID PRN    cloZAPine (CLOZARIL) tablet 275 mg, HS    hydrOXYzine HCL (ATARAX) tablet 50 mg, Q6H PRN Max 4/day **OR** diphenhydrAMINE (BENADRYL) injection 50 mg, Q6H PRN    hydrOXYzine HCL (ATARAX) tablet 50 mg, Q6H PRN Max 4/day **OR** diphenhydrAMINE (BENADRYL) injection 50 mg, Q6H PRN    diphenhydrAMINE-zinc acetate (BENADRYL) 2-0.1 % cream, BID PRN    docusate sodium (COLACE) capsule 100 mg, BID    escitalopram (LEXAPRO) tablet 20 mg, Daily    famotidine (PEPCID) tablet 20 mg, BID PRN    fluticasone (FLONASE) 50 mcg/act nasal spray 1 spray, Daily PRN    haloperidol (HALDOL) tablet 1 mg, Q6H PRN    haloperidol (HALDOL) tablet 2.5 mg, Q4H PRN Max 4/day    haloperidol (HALDOL) tablet 5 mg, Q4H PRN Max 4/day    haloperidol (HALDOL) tablet 5 mg, BID    hydroCHLOROthiazide tablet 12.5 mg, Daily    hydrocortisone 1 % ointment, 4x Daily PRN    hydrOXYzine HCL (ATARAX) tablet 100 mg, Q6H PRN Max 4/day **OR** LORazepam (ATIVAN) injection 2 mg, Q6H PRN    hydrOXYzine HCL (ATARAX) tablet 100 mg, Q6H PRN Max 4/day **OR** LORazepam (ATIVAN) injection 2 mg, Q6H PRN    hydrOXYzine  HCL (ATARAX) tablet 25 mg, Q6H PRN Max 4/day    ibuprofen (MOTRIN) tablet 600 mg, Q8H PRN    influenza vaccine (PF) (Fluarix) IM injection 0.5 mL, Once    loratadine (CLARITIN) tablet 10 mg, Daily    magnesium hydroxide (MILK OF MAGNESIA) oral suspension 30 mL, Once    melatonin tablet 3 mg, HS PRN    melatonin tablet 9 mg, HS    methocarbamol (ROBAXIN) tablet 500 mg, Q6H PRN    multivitamin-minerals (CENTRUM) tablet 1 tablet, Daily    nicotine polacrilex (NICORETTE) gum 2 mg, Q4H PRN    OLANZapine (ZyPREXA) tablet 5 mg, Q4H PRN Max 3/day **OR** OLANZapine (ZyPREXA) IM injection 2.5 mg, Q4H PRN Max 3/day    OLANZapine (ZyPREXA) tablet 5 mg, Q3H PRN Max 3/day **OR** OLANZapine (ZyPREXA) IM injection 5 mg, Q3H PRN Max 3/day    OLANZapine (ZyPREXA) tablet 2.5 mg, Q4H PRN Max 6/day    ondansetron (ZOFRAN-ODT) dispersible tablet 4 mg, Q6H PRN    pantoprazole (PROTONIX) EC tablet 20 mg, QAM    polyethylene glycol (MIRALAX) packet 17 g, Daily PRN    polyethylene glycol (MIRALAX) packet 17 g, Daily    propranolol (INDERAL) tablet 10 mg, Q12H KAYLIE    saliva substitute (MOUTH KOTE) mucosal solution 5 spray, 4x Daily PRN    senna-docusate sodium (SENOKOT S) 8.6-50 mg per tablet 1 tablet, Daily PRN    senna-docusate sodium (SENOKOT S) 8.6-50 mg per tablet 2 tablet, HS    traZODone (DESYREL) tablet 50 mg, HS PRN    white petrolatum-mineral oil (EUCERIN,HYDROCERIN) cream, TID PRN.    Risks, benefits and possible side effects of Medications:   Risks, benefits, and possible side effects of medications explained to patient and patient verbalizes understanding and agreement for treatment.    Dual Antipsychotic Rationale: Patient unable to gain improvement on single antipsychotic, trialed over 3.      Mental Status Evaluation:  Appearance:  age appropriate, overweight, and good eye contact   Behavior:  Cooperative, pleasant   Speech:  normal volume   Mood:  normal   Affect:  blunted   Language anomia No and aphasia  No   Thought  Process:  normal   Thought Content:  normal   Perceptual Disturbances: Auditory hallucinations without commands   Risk Potential: Suicidal Ideations none, Homicidal Ideations none, and Potential for Aggression No   Sensorium:  person, place, and situation   Cognition:  recent and remote memory grossly intact   Consciousness:  alert and awake    Attention: attention span appeared shorter than expected for age   Insight:  limited   Judgment: limited   Intellect Not assessed   Gait/Station: In bed   Motor Activity: no abnormal movements     Memory: Short and long term memory  grossly in tact     Progress Toward Goals: unchanged, as evidenced by their participation (or lack thereof) in individual, social and therapeutic milieu in addition to adherence to their medication regimen.  Patient Acceptance: accepting  Patient Understanding: moderate  Patient Involvement in Reintegration Process: involved  Patient's Therapeutic Relationship with Psychiatrist: good  Patient's Optimism Regarding Recovery: optimistic    Recommended Treatment:   See above for assessment and plan.    Inpatient Psychiatric Certification: Based upon physical, mental and social evaluations, I certify that inpatient psychiatric services are medically necessary for this patient for a duration of greater than 2 midnights for the treatment of Schizoaffective disorder, bipolar type (HCC) including psychotropic medication management, participation in the therapeutic milieu and referrals as indicated. Available alternative community resources do not meet the patient's mental health care needs. I further attest that an established written individualized plan of care has been implemented and is outlined in the patient's medical records.    Counseling/Coordination of Care    I have expended greater than 15 minutes in which more than 50% of this time was expended in counseling/coordination of patient care relating to diagnostic results, prognosis, potential risks  and benefits of management options, instructions for appropriate management, patient and/or collateral education, importance of adherence to management and/or risk factor reductions. Patient's rights, confidentiality, exceptions to confidentiality, use of electronic medical record including appropriate staff access to medical record regarding behavioral health services and consent to treatment were reviewed.    Note Share:     This note was shared with the patient     This note has been constructed using a voice recognition system. There may be translation, syntax,  or grammatical errors. If you have any questions, please contact the dictating provider.    Harjeet Torres, DO 03/12/25

## 2025-03-12 NOTE — NURSING NOTE
Pt is visible in the milieu and social with select peers. He refused dinner, but had evening snack. Took his medications without incidence. Pt is polite, pleasant, and cooperative. Brightens on approach. Pt endorses threatening AH, but denied all other psychiatric symptoms. Pt has mixed feeling regarding upcoming discharge, but is excited. Attended 10/11 groups. No behavioral issues.

## 2025-03-12 NOTE — DISCHARGE SUMMARY
Psychiatric Discharge Summary - Department of Behavioral Health   Alberto Berumen 29 y.o. male MRN: [id number]  Unit/Bed#: [facility] 112-02 Encounter: [id number]     Admission Date:   Admission Orders (From admission, onward)       Ordered        03/29/24 2104  Admit Patient to  Once                                Discharge Date: 03/14/25    Attending Psychiatrist: Jordan C Holter, DO    Assessment & Plan  Schizoaffective disorder, bipolar type (HCC)    Continue psychotropic medications including:   Continue Clozapine 275 mg nightly to confer mood stability in addition to addressing psychosis.   03/04: , BNP 44, CRP 7.2, ANC 4.08  CBC w/diff and CK every 2 weeks  Continue Cogentin 0.5 mg twice daily for EPS signs/symptoms.   Continue Haldol 5 mg twice daily to address psychosis   Continue Lexapro 20 mg daily to address depression and anxiety  Continue Propanolol 10 mg twice daily to address anxiety   Continue Melatonin 9 mg to address insomnia  On 2 antipsychotics due to subtherapeutic effects with monotherapy  Continue discharge planning: Patient pending ACT team linkage for discharge to his aunt's residence once MA funding comes through from the [facility].      No associated orders from this encounter found during lookback period of 72 hours.    Chronic idiopathic constipation  Continue Senokot 2 tablets at bedtime, MiraLAX 17 g daily, and Colace twice daily      No associated orders from this encounter found during lookback period of 72 hours.    Tobacco abuse  Continue nicotine gum and continue supportive therapy including promotion of smoking cessation        No associated orders from this encounter found during lookback period of 72 hours.  Class 2 obesity in adult  Most recent weight 242 pounds on March 2, 2025, continue dietary management        No associated orders from this encounter found during lookback period of 72 hours.    Primary hypertension  Continue Norvasc 5 mg daily and  "hydrochlorothiazide 12.5 mg daily      No associated orders from this encounter found during lookback period of 72 hours.    GERD (gastroesophageal reflux disease)  Continue medical management      No associated orders from this encounter found during lookback period of 72 hours.    Elevated hemoglobin A1c  Continue medical management      No associated orders from this encounter found during lookback period of 72 hours.  Allergies    No associated orders from this encounter found during lookback period of 72 hours.       SUBJECTIVE     Reason for Admission:   Alberto Berumen is a 29 y.o. male, admitted to the inpatient behavioral health unit at [facility] Unit 3P in November 2023, as a voluntarily 201 commitment, subsequent to threatening auditory hallucinations, suicidal ideation, worsening depression. Subsequently, He was transferred to the [facility] in March 2024 for further treatment. Before admission to [facility] he received 13/15 ECT treatments.Alberto reported worsening hallucinations and psychotic symptoms as well as disorganized behaviors and depression. Please refer to the initial H&P on 11/14/2023 for full details.    From H and P written by Psychiatrist Jordan Holter, DO on 3/30/24:  \"Alberto Berumen is a 28 y.o., overtly appearing -American, single male, possessing pertinent psychiatric history of bipolar type of schizoaffective disorder versus schizophrenia, medically complicated by hypertension and chronic idiopathic constipation, presenting to [facility] extended acute care unit (EAC) as a 201, initially admitted to [facility] inpatient behavioral health 3P, subsequent to setting dysphoric mood including depression, depressive signs/symptoms that include suicidal ideation in addition to signs/symptoms of psychosis including command auditory hallucinations involving homicidal ideation against his " "relatives.  Patient was transitioned from acute inpatient behavioral health to extended acute care secondary to high risk for psychiatric decompensation outside of a structured setting, currently receiving electroconvulsive therapy as an adjunct to his psychotropic medication regimen.  Patient possesses a pervasive and prevalent history of psychiatric illness including a plethora of prior inpatient behavioral health admissions including 3 admissions through [facility] in 2023 alone.     Presently, patient appears scant, sparse, minimally interactive involving this writer, disinterested in this writer's inquiries, denying dysphoric mood like depression and anxiety, although patient states that he is \"tired\", denying instances of panic, signs/symptoms of aimee/hypomania, although he admits to intermittent instances of auditory hallucinations, slightly less pervasive and prevalent in comparison to previous evaluation, denying suicidal/homicidal ideation in addition to thoughts of self-injury, chuckie for safety, receptive to crisis planning provided by this writer.\"    Hospital Course:   Throughout admission,  Alberto trialed lexapro, clozaril, propanolol, haldol, loxapine, cogentin, lithium, remeron, zyprexa trazodone. Medications were appropriately titrated including discontinuing loxapine and trazodone, discontinuing lithium, zyprexa and remeron and starting and/or titrating other medications for a discharge medication list of: Cogentin 0.5mg BID po, Clozaril 275mg HS po with bowel regimen of docusate sodium 100mg daily po, miralax 17g daily, senokot 2 tablets po HS, haldol 5mg BID po, melatonin 9mg HS po, lexapro 20mg daily po.  The patient adhered to their medication regimen and denied any acute adverse effects. Their mood brightened throughout the course of psychiatric management and he was seen in [facility] interacting appropriately with peers. Alberto did not demonstrate dangerous behavior to self or their " "peers his inpatient stay. Alberto denied suicidal and homicidal ideations at the time of his discharge. Patient states he is looking forward to joining a gym, and working out has helped his anxiety and depression. He is looking forward to \"joining a Jehovah's witness\" and is still thinking about some longer term goals for his life. He has a daily goal to workout, pray, and socialize and feels positive about his future. Alberto is being discharged to his aunts house with ACT services.    OBJECTIVE     Vital signs in last 24 hours:    Vitals:    03/11/25 0721 03/11/25 1508 03/11/25 1955 03/12/25 0740   BP: 124/60 127/74 117/69 122/63   BP Location: Right arm Right arm Right arm Left arm   Pulse: 97 98 100 90   Resp: 18 18  18   Temp: 97.9 °F (36.6 °C) 98 °F (36.7 °C)  97.5 °F (36.4 °C)   TempSrc: Temporal Temporal  Temporal   SpO2: 98% 99%  99%   Weight:       Height:           Labs/Imaging:   I have personally reviewed all pertinent laboratory/tests results.  Most Recent Labs:   Lab Results   Component Value Date    WBC 8.53 03/04/2025    RBC 5.45 03/04/2025    HGB 12.6 03/04/2025    HCT 42.9 03/04/2025     03/04/2025    RDW 14.1 03/04/2025    NEUTROABS 3.56 03/04/2025    SODIUM 140 10/16/2024    K 3.8 10/16/2024     10/16/2024    CO2 29 10/16/2024    BUN 9 10/16/2024    CREATININE 0.91 10/16/2024    GLUC 94 10/16/2024    GLUF 94 10/16/2024    CALCIUM 9.5 10/16/2024    AST 26 09/30/2024    ALT 42 09/30/2024    ALKPHOS 64 09/30/2024    TP 6.6 09/30/2024    ALB 3.8 09/30/2024    TBILI 0.47 09/30/2024    CHOLESTEROL 156 03/14/2024    HDL 44 03/14/2024    TRIG 133 03/14/2024    LDLCALC 85 03/14/2024    NONHDLC 112 03/14/2024    LITHIUM 0.61 01/09/2024    [id number] 1.062 11/15/2023    HGBA1C 5.0 04/01/2024    EAG 97 04/01/2024       Mental Status Evaluation (at time of discharge):   Appearance:  age appropriate, dressed appropriately, looks stated age  sitting comfortably in chair, adequate hygiene and grooming, " cooperative with interview, fair eye contact   Behavior:  cooperative   Speech:  normal rate, normal volume, normal pitch, fluent, clear, and coherent   Mood:  Increasingly euthymic, somewhat anxious   Affect:  mood-congruent   Language Within normal limits   Thought Process:  organized, logical, goal directed, normal rate of thoughts   Thought Content:  Residual paranoid delusions   Perceptual Disturbances: Reports intermittent auditory hallucinations that are negative in content   Risk Potential: Denies suicidal or homicidal ideation, plan, or intent   Sensorium:  person, place, time, and current situation   Cognition:  Grossly intact   Consciousness:  alert and awake   Attention: attention span and concentration were age appropriate   Insight:  fair   Judgment: fair   Intellect appears to be of average intelligence   Gait/Station: normal gait/station   Motor Activity: no abnormal movements     ASSESSMENT & PLAN     Discharge Diagnosis:   Patient Active Problem List   Diagnosis    GERD (gastroesophageal reflux disease)    Schizoaffective disorder, bipolar type (HCC)    Tobacco abuse    T wave inversion in EKG    Syringoma    Chronic idiopathic constipation    Vitamin B 12 deficiency    Vitamin D deficiency    Confluent and reticulate papillomatosis    Class 2 obesity in adult    Primary hypertension    Elevated hemoglobin A1c    Bilateral lower extremity edema    Allergies       Discharge Behavioral Health Medications: all current active meds have been reviewed, continue current psychiatric medications, and current meds:   Current Facility-Administered Medications:     acetaminophen (TYLENOL) tablet 650 mg, Q4H PRN    acetaminophen (TYLENOL) tablet 650 mg, Q6H PRN    aluminum-magnesium hydroxide-simethicone (MAALOX) oral suspension 30 mL, Q4H PRN    amLODIPine (NORVASC) tablet 5 mg, Daily    Artificial Tears ophthalmic solution 1 drop, Q3H PRN    haloperidol lactate (HALDOL) injection 2.5 mg, Q4H PRN Max 4/day  **AND** LORazepam (ATIVAN) injection 1 mg, Q4H PRN Max 4/day **AND** benztropine (COGENTIN) injection 0.5 mg, Q4H PRN Max 4/day    haloperidol lactate (HALDOL) injection 5 mg, Q4H PRN Max 4/day **AND** LORazepam (ATIVAN) injection 2 mg, Q4H PRN Max 4/day **AND** benztropine (COGENTIN) injection 1 mg, Q4H PRN Max 4/day    benztropine (COGENTIN) tablet 0.5 mg, BID    bisacodyl (DULCOLAX) rectal suppository 10 mg, Daily PRN    calcium carbonate (TUMS) chewable tablet 500 mg, BID PRN    cloZAPine (CLOZARIL) tablet 275 mg, HS    hydrOXYzine HCL (ATARAX) tablet 50 mg, Q6H PRN Max 4/day **OR** diphenhydrAMINE (BENADRYL) injection 50 mg, Q6H PRN    hydrOXYzine HCL (ATARAX) tablet 50 mg, Q6H PRN Max 4/day **OR** diphenhydrAMINE (BENADRYL) injection 50 mg, Q6H PRN    diphenhydrAMINE-zinc acetate (BENADRYL) 2-0.1 % cream, BID PRN    docusate sodium (COLACE) capsule 100 mg, BID    escitalopram (LEXAPRO) tablet 20 mg, Daily    famotidine (PEPCID) tablet 20 mg, BID PRN    fluticasone (FLONASE) 50 mcg/act nasal spray 1 spray, Daily PRN    haloperidol (HALDOL) tablet 1 mg, Q6H PRN    haloperidol (HALDOL) tablet 2.5 mg, Q4H PRN Max 4/day    haloperidol (HALDOL) tablet 5 mg, Q4H PRN Max 4/day    haloperidol (HALDOL) tablet 5 mg, BID    hydroCHLOROthiazide tablet 12.5 mg, Daily    hydrocortisone 1 % ointment, 4x Daily PRN    hydrOXYzine HCL (ATARAX) tablet 100 mg, Q6H PRN Max 4/day **OR** LORazepam (ATIVAN) injection 2 mg, Q6H PRN    hydrOXYzine HCL (ATARAX) tablet 100 mg, Q6H PRN Max 4/day **OR** LORazepam (ATIVAN) injection 2 mg, Q6H PRN    hydrOXYzine HCL (ATARAX) tablet 25 mg, Q6H PRN Max 4/day    ibuprofen (MOTRIN) tablet 600 mg, Q8H PRN    influenza vaccine (PF) (Fluarix) IM injection 0.5 mL, Once    loratadine (CLARITIN) tablet 10 mg, Daily    magnesium hydroxide (MILK OF MAGNESIA) oral suspension 30 mL, Once    melatonin tablet 3 mg, HS PRN    melatonin tablet 9 mg, HS    methocarbamol (ROBAXIN) tablet 500 mg, Q6H PRN     multivitamin-minerals (CENTRUM) tablet 1 tablet, Daily    nicotine polacrilex (NICORETTE) gum 2 mg, Q4H PRN    OLANZapine (ZyPREXA) tablet 5 mg, Q4H PRN Max 3/day **OR** OLANZapine (ZyPREXA) IM injection 2.5 mg, Q4H PRN Max 3/day    OLANZapine (ZyPREXA) tablet 5 mg, Q3H PRN Max 3/day **OR** OLANZapine (ZyPREXA) IM injection 5 mg, Q3H PRN Max 3/day    OLANZapine (ZyPREXA) tablet 2.5 mg, Q4H PRN Max 6/day    ondansetron (ZOFRAN-ODT) dispersible tablet 4 mg, Q6H PRN    pantoprazole (PROTONIX) EC tablet 20 mg, QAM    polyethylene glycol (MIRALAX) packet 17 g, Daily PRN    polyethylene glycol (MIRALAX) packet 17 g, Daily    propranolol (INDERAL) tablet 10 mg, Q12H KAYLIE    saliva substitute (MOUTH KOTE) mucosal solution 5 spray, 4x Daily PRN    senna-docusate sodium (SENOKOT S) 8.6-50 mg per tablet 1 tablet, Daily PRN    senna-docusate sodium (SENOKOT S) 8.6-50 mg per tablet 2 tablet, HS    traZODone (DESYREL) tablet 50 mg, HS PRN    white petrolatum-mineral oil (EUCERIN,HYDROCERIN) cream, TID PRN.    Discharge instructions/Information to patient and family:   See after visit summary for information provided to patient and family. See list above, as well as the after visit summary containing reconciled discharge medications provided to patient and family.       Provisions for Follow-Up Care:  See after visit summary for information related to follow-up care and any pertinent home health orders.      Note Share:    This note was shared with patient.    This note has been constructed using a voice recognition system. There may be translation, syntax,  or grammatical errors. If you have any questions, please contact the dictating provider.    Jordan Christopher Holter, DO

## 2025-03-12 NOTE — NURSING NOTE
Patient is out early, visible and social with peers . Patient attends almost all groups today. Pt with good appetite at lunch eating 100%. Pt takes all medications without issue.

## 2025-03-12 NOTE — PLAN OF CARE
Problem: Alteration in Thoughts and Perception  Goal: Treatment Goal: Gain control of psychotic behaviors/thinking, reduce/eliminate presenting symptoms and demonstrate improved reality functioning upon discharge  Outcome: Progressing  Goal: Refrain from acting on delusional thinking/internal stimuli  Description: Interventions:  - Monitor patient closely, per order   - Utilize least restrictive measures   - Set reasonable limits, give positive feedback for acceptable   - Administer medications as ordered and monitor of potential side effects  Outcome: Progressing  Goal: Agree to be compliant with medication regime, as prescribed and report medication side effects  Description: Interventions:  - Offer appropriate PRN medication and supervise ingestion; conduct AIMS, as needed   Outcome: Progressing  Goal: Recognize dysfunctional thoughts, communicate reality-based thoughts at the time of discharge  Description: Interventions:  - Provide medication and psycho-education to assist patient in compliance and developing insight into his/her illness   Outcome: Progressing     Problem: Ineffective Coping  Goal: Identifies ineffective coping skills  Outcome: Progressing  Goal: Identifies healthy coping skills  Outcome: Progressing  Goal: Demonstrates healthy coping skills  Outcome: Progressing     Problem: Depression  Goal: Treatment Goal: Demonstrate behavioral control of depressive symptoms, verbalize feelings of improved mood/affect, and adopt new coping skills prior to discharge  Outcome: Progressing  Goal: Verbalize thoughts and feelings  Description: Interventions:  - Assess and re-assess patient's level of risk   - Engage patient in 1:1 interactions, daily, for a minimum of 15 minutes   - Encourage patient to express feelings, fears, frustrations, hopes   Outcome: Progressing  Goal: Refrain from isolation  Description: Interventions:  - Develop a trusting relationship   - Encourage socialization   Outcome:  Progressing  Goal: Refrain from self-neglect  Outcome: Progressing     Problem: Anxiety  Goal: Anxiety is at manageable level  Description: Interventions:  - Assess and monitor patient's anxiety level.   - Monitor for signs and symptoms (heart palpitations, chest pain, shortness of breath, headaches, nausea, feeling jumpy, restlessness, irritable, apprehensive).   - Collaborate with interdisciplinary team and initiate plan and interventions as ordered.  - [place] patient to unit/surroundings  - Explain treatment plan  - Encourage participation in care  - Encourage verbalization of concerns/fears  - Identify coping mechanisms  - Assist in developing anxiety-reducing skills  - Administer/offer alternative therapies  - Limit or eliminate stimulants  Outcome: Progressing     Problem: Alteration in Orientation  Goal: Interact with staff daily  Description: Interventions:  - Assess and re-assess patient's level of orientation  - Engage patient in 1 on 1 interactions, daily, for a minimum of 15 minutes   - Establish rapport/trust with patient   Outcome: Progressing     Problem: DISCHARGE PLANNING  Goal: Discharge to home with appropriate resources  Description: INTERVENTIONS:  - Identify barriers to discharge w/patient and caregiver  - Arrange for needed discharge resources and transportation as appropriate  - Identify discharge learning needs (meds, wound care, etc.)  - Refer to Case Management Department for coordinating discharge planning if the patient needs post-hospital services based on physician/advanced practitioner order or complex needs related to functional status, cognitive ability, or social support system  Outcome: Progressing

## 2025-03-13 PROCEDURE — 99232 SBSQ HOSP IP/OBS MODERATE 35: CPT | Performed by: PSYCHIATRY & NEUROLOGY

## 2025-03-13 RX ADMIN — NICOTINE POLACRILEX 2 MG: 2 GUM, CHEWING ORAL at 17:12

## 2025-03-13 RX ADMIN — MELATONIN TAB 3 MG 9 MG: 3 TAB at 21:26

## 2025-03-13 RX ADMIN — DOCUSATE SODIUM 100 MG: 100 CAPSULE, LIQUID FILLED ORAL at 17:12

## 2025-03-13 RX ADMIN — NICOTINE POLACRILEX 2 MG: 2 GUM, CHEWING ORAL at 21:26

## 2025-03-13 RX ADMIN — SENNOSIDES AND DOCUSATE SODIUM 2 TABLET: 50; 8.6 TABLET ORAL at 21:26

## 2025-03-13 RX ADMIN — LORATADINE 10 MG: 10 TABLET ORAL at 08:44

## 2025-03-13 RX ADMIN — HYDROCHLOROTHIAZIDE 12.5 MG: 12.5 TABLET ORAL at 08:43

## 2025-03-13 RX ADMIN — ESCITALOPRAM OXALATE 20 MG: 10 TABLET, FILM COATED ORAL at 08:43

## 2025-03-13 RX ADMIN — MULTIPLE VITAMINS W/ MINERALS TAB 1 TABLET: TAB ORAL at 08:43

## 2025-03-13 RX ADMIN — AMLODIPINE BESYLATE 5 MG: 5 TABLET ORAL at 08:44

## 2025-03-13 RX ADMIN — PROPRANOLOL HYDROCHLORIDE 10 MG: 10 TABLET ORAL at 21:26

## 2025-03-13 RX ADMIN — HALOPERIDOL 5 MG: 5 TABLET ORAL at 17:12

## 2025-03-13 RX ADMIN — PANTOPRAZOLE SODIUM 20 MG: 20 TABLET, DELAYED RELEASE ORAL at 08:42

## 2025-03-13 RX ADMIN — BENZTROPINE MESYLATE 0.5 MG: 0.5 TABLET ORAL at 17:12

## 2025-03-13 RX ADMIN — BENZTROPINE MESYLATE 0.5 MG: 0.5 TABLET ORAL at 08:43

## 2025-03-13 RX ADMIN — NICOTINE POLACRILEX 2 MG: 2 GUM, CHEWING ORAL at 12:46

## 2025-03-13 RX ADMIN — CLOZAPINE 275 MG: 25 TABLET ORAL at 21:26

## 2025-03-13 RX ADMIN — NICOTINE POLACRILEX 2 MG: 2 GUM, CHEWING ORAL at 08:43

## 2025-03-13 RX ADMIN — PROPRANOLOL HYDROCHLORIDE 10 MG: 10 TABLET ORAL at 08:44

## 2025-03-13 RX ADMIN — HALOPERIDOL 5 MG: 5 TABLET ORAL at 08:44

## 2025-03-13 RX ADMIN — DOCUSATE SODIUM 100 MG: 100 CAPSULE, LIQUID FILLED ORAL at 08:43

## 2025-03-13 NOTE — NURSING NOTE
Patient has been visible most of the shift. Social with peers and cooperative. Eager for tomorrow's discharge. Pleasant. Denies suicidal ideations.  Security package is locked up in the med cabinet for safety. He as informed to pack up his belongings tonight. Offers no current complaints. Denies suicidal ideations.

## 2025-03-13 NOTE — PROGRESS NOTES
03/13/25 0748   Team Meeting   Meeting Type Daily Rounds   Team Members Present   Team Members Present Physician;Nurse;;Other (Discipline and Name)   Physician Team Member Holter   Nursing Team Member Claudia   Social Work Team Member Jose J ORTEGA   Other (Discipline and Name) Wang PCM   Patient/Family Present   Patient Present No   Patient's Family Present No     Groups Participation  9/10.   Patient's compliant with medications. He's engaged in his treatment. He's appropriate. ACT intake today at 1030. CSP and D/C tomorrow. Patient to discharge home. AH at times but uses coping skills

## 2025-03-13 NOTE — NURSING NOTE
Assumed care of pt @ 1900. Pt is visible in the milieu and social with select peers. Took HS medications without incidence. Endorses threatening AH, but denied SI/HI/VH. Attended 9/10 groups. Pt is excited for upcoming discharge. No behavioral issues.

## 2025-03-13 NOTE — PLAN OF CARE
Problem: Alteration in Thoughts and Perception  Goal: Treatment Goal: Gain control of psychotic behaviors/thinking, reduce/eliminate presenting symptoms and demonstrate improved reality functioning upon discharge  Outcome: Progressing  Goal: Refrain from acting on delusional thinking/internal stimuli  Description: Interventions:  - Monitor patient closely, per order   - Utilize least restrictive measures   - Set reasonable limits, give positive feedback for acceptable   - Administer medications as ordered and monitor of potential side effects  Outcome: Progressing  Goal: Agree to be compliant with medication regime, as prescribed and report medication side effects  Description: Interventions:  - Offer appropriate PRN medication and supervise ingestion; conduct AIMS, as needed   Outcome: Progressing  Goal: Recognize dysfunctional thoughts, communicate reality-based thoughts at the time of discharge  Description: Interventions:  - Provide medication and psycho-education to assist patient in compliance and developing insight into his/her illness   Outcome: Progressing     Problem: Ineffective Coping  Goal: Patient/Family participate in treatment and DC plans  Description: Interventions:  - Provide therapeutic environment  Outcome: Progressing  Goal: Patient/Family verbalizes awareness of resources  Outcome: Progressing     Problem: Depression  Goal: Treatment Goal: Demonstrate behavioral control of depressive symptoms, verbalize feelings of improved mood/affect, and adopt new coping skills prior to discharge  Outcome: Progressing  Goal: Verbalize thoughts and feelings  Description: Interventions:  - Assess and re-assess patient's level of risk   - Engage patient in 1:1 interactions, daily, for a minimum of 15 minutes   - Encourage patient to express feelings, fears, frustrations, hopes   Outcome: Progressing  Goal: Refrain from harming self  Description: Interventions:  - Monitor patient closely, per order   -  Supervise medication ingestion, monitor effects and side effects   Outcome: Progressing  Goal: Refrain from isolation  Description: Interventions:  - Develop a trusting relationship   - Encourage socialization   Outcome: Progressing  Goal: Refrain from self-neglect  Outcome: Progressing     Problem: Anxiety  Goal: Anxiety is at manageable level  Description: Interventions:  - Assess and monitor patient's anxiety level.   - Monitor for signs and symptoms (heart palpitations, chest pain, shortness of breath, headaches, nausea, feeling jumpy, restlessness, irritable, apprehensive).   - Collaborate with interdisciplinary team and initiate plan and interventions as ordered.  - [place] patient to unit/surroundings  - Explain treatment plan  - Encourage participation in care  - Encourage verbalization of concerns/fears  - Identify coping mechanisms  - Assist in developing anxiety-reducing skills  - Administer/offer alternative therapies  - Limit or eliminate stimulants  Outcome: Progressing     Problem: Alteration in Orientation  Goal: Treatment Goal: Demonstrate a reduction of confusion and improved orientation to person, place, time and/or situation upon discharge, according to optimum baseline/ability  Outcome: Progressing  Goal: Interact with staff daily  Description: Interventions:  - Assess and re-assess patient's level of orientation  - Engage patient in 1 on 1 interactions, daily, for a minimum of 15 minutes   - Establish rapport/trust with patient   Outcome: Progressing  Goal: Allow medical examinations, as recommended  Description: Interventions:  - Provide physical/neurological exams and/or referrals, per provider   Outcome: Progressing  Goal: Cooperate with recommended testing/procedures  Description: Interventions:  - Determine need for ancillary testing  - Observe for mental status changes  - Implement falls/precaution protocol   Outcome: Progressing  Goal: Complete daily ADLs, including personal hygiene  independently, as able  Description: Interventions:  - Observe, teach, and assist patient with ADLS  Outcome: Progressing     Problem: DISCHARGE PLANNING  Goal: Discharge to home with appropriate resources  Description: INTERVENTIONS:  - Identify barriers to discharge w/patient and caregiver  - Arrange for needed discharge resources and transportation as appropriate  - Identify discharge learning needs (meds, wound care, etc.)  - Refer to Case Management Department for coordinating discharge planning if the patient needs post-hospital services based on physician/advanced practitioner order or complex needs related to functional status, cognitive ability, or social support system  Outcome: Progressing

## 2025-03-13 NOTE — NURSING NOTE
Patient has a bright affect and is eager for tomorrow's discharge. Utilizing the [employer] phone and speaking to select peer son the unit. Appetite good. Offers no current complaints, suicidal, or homicidal ideations. Continues to have auditory hallucinations telling him they are going to hurt him and his family but states they are controlled and they don't bother him. Medications reviewed with him.

## 2025-03-13 NOTE — ASSESSMENT & PLAN NOTE
Continue psychotropic medications including:   Continue Clozapine 275 mg nightly to confer mood stability in addition to addressing psychosis.   03/04: , BNP 44, CRP 7.2, ANC 4.08  CBC w/diff and CK every 2 weeks  Continue Cogentin 0.5 mg twice daily for EPS signs/symptoms.   Continue Haldol 5 mg twice daily to address psychosis   Continue Lexapro 20 mg daily to address depression and anxiety  Continue Propanolol 10 mg twice daily to address anxiety   Continue Melatonin 9 mg to address insomnia  On 2 antipsychotics due to subtherapeutic effects with monotherapy  Continue discharge planning: Patient pending ACT team linkage for discharge to his aunt's residence once MA funding comes through from the [facility].      Orders from past 72 hours:    cloZAPine (CLOZARIL) 50 MG tablet; Take 5.5 tablets (275 mg total) by mouth daily at bedtime    haloperidol (HALDOL) 5 mg tablet; Take 1 tablet (5 mg total) by mouth 2 (two) times a day    melatonin 3 mg; Take 3 tablets (9 mg total) by mouth daily at bedtime    escitalopram (LEXAPRO) 20 mg tablet; Take 1 tablet (20 mg total) by mouth daily    propranolol (INDERAL) 10 mg tablet; Take 1 tablet (10 mg total) by mouth every 12 (twelve) hours

## 2025-03-13 NOTE — PROGRESS NOTES
Psychiatric Progress Note - Department of Behavioral Health   Alberto Berumen 29 y.o. male MRN: [id number]  Unit/Bed#: [facility] 112-02 Encounter: [id number]    ASSESSMENT & PLAN     Assessment & Plan  Schizoaffective disorder, bipolar type (HCC)    Continue psychotropic medications including:   Continue Clozapine 275 mg nightly to confer mood stability in addition to addressing psychosis.   03/04: , BNP 44, CRP 7.2, ANC 4.08  CBC w/diff and CK every 2 weeks  Continue Cogentin 0.5 mg twice daily for EPS signs/symptoms.   Continue Haldol 5 mg twice daily to address psychosis   Continue Lexapro 20 mg daily to address depression and anxiety  Continue Propanolol 10 mg twice daily to address anxiety   Continue Melatonin 9 mg to address insomnia  On 2 antipsychotics due to subtherapeutic effects with monotherapy  Continue discharge planning: Patient pending ACT team linkage for discharge to his aunt's residence once MA funding comes through from the [facility].      Orders from past 72 hours:    cloZAPine (CLOZARIL) 50 MG tablet; Take 5.5 tablets (275 mg total) by mouth daily at bedtime    haloperidol (HALDOL) 5 mg tablet; Take 1 tablet (5 mg total) by mouth 2 (two) times a day    melatonin 3 mg; Take 3 tablets (9 mg total) by mouth daily at bedtime    escitalopram (LEXAPRO) 20 mg tablet; Take 1 tablet (20 mg total) by mouth daily    propranolol (INDERAL) 10 mg tablet; Take 1 tablet (10 mg total) by mouth every 12 (twelve) hours    Chronic idiopathic constipation  Continue Senokot 2 tablets at bedtime, MiraLAX 17 g daily, and Colace twice daily      Orders from past 72 hours:    docusate sodium (COLACE) 100 mg capsule; Take 1 capsule (100 mg total) by mouth 2 (two) times a day    senna-docusate sodium (SENOKOT S) 8.6-50 mg per tablet; Take 2 tablets by mouth daily at bedtime    Tobacco abuse  Continue nicotine gum and continue supportive therapy including promotion of smoking cessation        No associated  orders from this encounter found during lookback period of 72 hours.  Class 2 obesity in adult  Most recent weight 242 pounds on March 2, 2025, continue dietary management        No associated orders from this encounter found during lookback period of 72 hours.    Primary hypertension  Continue Norvasc 5 mg daily and hydrochlorothiazide 12.5 mg daily      Orders from past 72 hours:    hydroCHLOROthiazide 12.5 mg tablet; Take 1 tablet (12.5 mg total) by mouth daily    amLODIPine (NORVASC) 5 mg tablet; Take 1 tablet (5 mg total) by mouth daily    propranolol (INDERAL) 10 mg tablet; Take 1 tablet (10 mg total) by mouth every 12 (twelve) hours    GERD (gastroesophageal reflux disease)  Continue medical management      Orders from past 72 hours:    pantoprazole (PROTONIX) 20 mg tablet; Take 1 tablet (20 mg total) by mouth every morning    Elevated hemoglobin A1c  Continue medical management      No associated orders from this encounter found during lookback period of 72 hours.  Allergies    Orders from past 72 hours:    loratadine (CLARITIN) 10 mg tablet; Take 1 tablet (10 mg total) by mouth daily      Treatment Recommendations/Precautions:  Continue to promote patient participation in therapeutic milieu.  Continue medical management per medicine.  Continue previously prescribed psychotropic medication regimen; see below.  Continue behavioral health checks q.15 minutes.   Continue vitals per behavioral health unit protocol.  Discharge date per primary team; 201 commitment status.    SUBJECTIVE     Patient evaluated this a.m. for continuity of care. Patient was discussed at length with nursing and treatment team. Per nursing, he continues to attend groups and is cooperative with treatment, has ACT intake today. No acute distress is noted throughout evaluation. Alberto Berumen denies suicidal/homicidal ideation in addition to thoughts of self-injury, receptive to crisis planning provided by this writer, contacting for  safety in the inpatient setting, admitting to an ability to appropriately confide in staff including this writer. Patient states he is looking forward to DC andis feeling nervous. He states that he hears more threatening voices and while working out helps the voices, he states that they are louder and harder to ignore than before - provided education on the role of stress and increases in AVH and he able to make some connections with previous times he has been stressed or excited/anxious and a corresponding increase to the voices. Provided encouragement to find more coping skills to help himself. Rates the voices 4/10 right now, but more intense earlier.    PSYCHIATRIC REVIEW OF SYSTEMS     Behavior over the last 24 hours:  unchanged  Sleep: normal  Appetite: normal  Medication side effects: No    REVIEW OF SYSTEMS   Review of systems: all other systems are negative    OBJECTIVE     Vital Signs in Past 24 Hours:  Temp:  [97.5 °F (36.4 °C)] 97.5 °F (36.4 °C)  HR:  [85-96] 85  BP: (125-143)/(64-77) 125/64  Resp:  [18] 18  SpO2:  [99 %-100 %] 100 %  O2 Device: None (Room air)    Intake/Output in Past 24 hours:  No intake/output data recorded.  No intake/output data recorded.        Laboratory Results:  I have personally reviewed all pertinent laboratory/tests results.  Most Recent Labs:   Lab Results   Component Value Date    WBC 8.53 03/04/2025    RBC 5.45 03/04/2025    HGB 12.6 03/04/2025    HCT 42.9 03/04/2025     03/04/2025    RDW 14.1 03/04/2025    NEUTROABS 3.56 03/04/2025    SODIUM 140 10/16/2024    K 3.8 10/16/2024     10/16/2024    CO2 29 10/16/2024    BUN 9 10/16/2024    CREATININE 0.91 10/16/2024    GLUC 94 10/16/2024    GLUF 94 10/16/2024    CALCIUM 9.5 10/16/2024    AST 26 09/30/2024    ALT 42 09/30/2024    ALKPHOS 64 09/30/2024    TP 6.6 09/30/2024    ALB 3.8 09/30/2024    TBILI 0.47 09/30/2024    CHOLESTEROL 156 03/14/2024    HDL 44 03/14/2024    TRIG 133 03/14/2024    LDLCALC 85 03/14/2024     [facility] 112 03/14/2024    LITHIUM 0.61 01/09/2024    VCN7ANUHCPDM 1.062 11/15/2023    HGBA1C 5.0 04/01/2024    EAG 97 04/01/2024       Behavioral Health Medications: all current active meds have been reviewed, continue current psychiatric medications, and current meds:   Current Facility-Administered Medications:     acetaminophen (TYLENOL) tablet 650 mg, Q4H PRN    acetaminophen (TYLENOL) tablet 650 mg, Q6H PRN    aluminum-magnesium hydroxide-simethicone (MAALOX) oral suspension 30 mL, Q4H PRN    amLODIPine (NORVASC) tablet 5 mg, Daily    Artificial Tears ophthalmic solution 1 drop, Q3H PRN    haloperidol lactate (HALDOL) injection 2.5 mg, Q4H PRN Max 4/day **AND** LORazepam (ATIVAN) injection 1 mg, Q4H PRN Max 4/day **AND** benztropine (COGENTIN) injection 0.5 mg, Q4H PRN Max 4/day    haloperidol lactate (HALDOL) injection 5 mg, Q4H PRN Max 4/day **AND** LORazepam (ATIVAN) injection 2 mg, Q4H PRN Max 4/day **AND** benztropine (COGENTIN) injection 1 mg, Q4H PRN Max 4/day    benztropine (COGENTIN) tablet 0.5 mg, BID    bisacodyl (DULCOLAX) rectal suppository 10 mg, Daily PRN    calcium carbonate (TUMS) chewable tablet 500 mg, BID PRN    cloZAPine (CLOZARIL) tablet 275 mg, HS    hydrOXYzine HCL (ATARAX) tablet 50 mg, Q6H PRN Max 4/day **OR** diphenhydrAMINE (BENADRYL) injection 50 mg, Q6H PRN    hydrOXYzine HCL (ATARAX) tablet 50 mg, Q6H PRN Max 4/day **OR** diphenhydrAMINE (BENADRYL) injection 50 mg, Q6H PRN    diphenhydrAMINE-zinc acetate (BENADRYL) 2-0.1 % cream, BID PRN    docusate sodium (COLACE) capsule 100 mg, BID    escitalopram (LEXAPRO) tablet 20 mg, Daily    famotidine (PEPCID) tablet 20 mg, BID PRN    fluticasone (FLONASE) 50 mcg/act nasal spray 1 spray, Daily PRN    haloperidol (HALDOL) tablet 1 mg, Q6H PRN    haloperidol (HALDOL) tablet 2.5 mg, Q4H PRN Max 4/day    haloperidol (HALDOL) tablet 5 mg, Q4H PRN Max 4/day    haloperidol (HALDOL) tablet 5 mg, BID    hydroCHLOROthiazide tablet 12.5 mg,  Daily    hydrocortisone 1 % ointment, 4x Daily PRN    hydrOXYzine HCL (ATARAX) tablet 100 mg, Q6H PRN Max 4/day **OR** LORazepam (ATIVAN) injection 2 mg, Q6H PRN    hydrOXYzine HCL (ATARAX) tablet 100 mg, Q6H PRN Max 4/day **OR** LORazepam (ATIVAN) injection 2 mg, Q6H PRN    hydrOXYzine HCL (ATARAX) tablet 25 mg, Q6H PRN Max 4/day    ibuprofen (MOTRIN) tablet 600 mg, Q8H PRN    influenza vaccine (PF) (Fluarix) IM injection 0.5 mL, Once    loratadine (CLARITIN) tablet 10 mg, Daily    magnesium hydroxide (MILK OF MAGNESIA) oral suspension 30 mL, Once    melatonin tablet 3 mg, HS PRN    melatonin tablet 9 mg, HS    methocarbamol (ROBAXIN) tablet 500 mg, Q6H PRN    multivitamin-minerals (CENTRUM) tablet 1 tablet, Daily    nicotine polacrilex (NICORETTE) gum 2 mg, Q4H PRN    OLANZapine (ZyPREXA) tablet 5 mg, Q4H PRN Max 3/day **OR** OLANZapine (ZyPREXA) IM injection 2.5 mg, Q4H PRN Max 3/day    OLANZapine (ZyPREXA) tablet 5 mg, Q3H PRN Max 3/day **OR** OLANZapine (ZyPREXA) IM injection 5 mg, Q3H PRN Max 3/day    OLANZapine (ZyPREXA) tablet 2.5 mg, Q4H PRN Max 6/day    ondansetron (ZOFRAN-ODT) dispersible tablet 4 mg, Q6H PRN    pantoprazole (PROTONIX) EC tablet 20 mg, QAM    polyethylene glycol (MIRALAX) packet 17 g, Daily PRN    polyethylene glycol (MIRALAX) packet 17 g, Daily    propranolol (INDERAL) tablet 10 mg, Q12H KAYLIE    saliva substitute (MOUTH KOTE) mucosal solution 5 spray, 4x Daily PRN    senna-docusate sodium (SENOKOT S) 8.6-50 mg per tablet 1 tablet, Daily PRN    senna-docusate sodium (SENOKOT S) 8.6-50 mg per tablet 2 tablet, HS    traZODone (DESYREL) tablet 50 mg, HS PRN    white petrolatum-mineral oil (EUCERIN,HYDROCERIN) cream, TID PRN.    Risks, benefits and possible side effects of Medications:   Risks, benefits, and possible side effects of medications explained to patient and patient verbalizes understanding and agreement for treatment.    Dual Antipsychotic Rationale: due to subtherapeutic response  on multiple single anti psychotics previously     Mental Status Evaluation:  Appearance:  age appropriate, casually dressed, overweight, and good eye contact   Behavior:  cooperative   Speech:  soft   Mood:  anxious   Affect:  blunted   Language anomia No and aphasia  No   Thought Process:  concrete and perserverative   Thought Content:  normal   Perceptual Disturbances: Auditory hallucinations without commands   Risk Potential: Suicidal Ideations none, Homicidal Ideations none, and Potential for Aggression No   Sensorium:  person, place, and situation   Cognition:  recent and remote memory grossly intact   Consciousness:  alert and awake    Attention: attention span appeared shorter than expected for age   Insight:  limited   Judgment: limited   Intellect Not assessed   Gait/Station: In bed   Motor Activity: no abnormal movements     Memory: Short and long term memory  intact     Progress Toward Goals: improved, as evidenced by their participation (or lack thereof) in individual, social and therapeutic milieu in addition to adherence to their medication regimen.  Patient Acceptance: accepting  Patient Understanding: improving  Patient Involvement in Reintegration Process: involved  Patient's Therapeutic Relationship with Psychiatrist: good  Patient's Optimism Regarding Recovery: positive    Recommended Treatment:   See above for assessment and plan.    Inpatient Psychiatric Certification: Based upon physical, mental and social evaluations, I certify that inpatient psychiatric services are medically necessary for this patient for a duration of greater than 2 midnights for the treatment of Schizoaffective disorder, bipolar type (HCC) including psychotropic medication management, participation in the therapeutic milieu and referrals as indicated. Available alternative community resources do not meet the patient's mental health care needs. I further attest that an established written individualized plan of care has  been implemented and is outlined in the patient's medical records.    Counseling/Coordination of Care    I have expended greater than 15 minutes in which more than 50% of this time was expended in counseling/coordination of patient care relating to diagnostic results, prognosis, potential risks and benefits of management options, instructions for appropriate management, patient and/or collateral education, importance of adherence to management and/or risk factor reductions. Patient's rights, confidentiality, exceptions to confidentiality, use of electronic medical record including appropriate staff access to medical record regarding behavioral health services and consent to treatment were reviewed.    Note Share:     This note was shared with the patient    This note has been constructed using a voice recognition system. There may be translation, syntax,  or grammatical errors. If you have any questions, please contact the dictating provider.    Harjeet Torres,  03/13/25

## 2025-03-14 VITALS
OXYGEN SATURATION: 99 % | HEIGHT: 66 IN | TEMPERATURE: 97.5 F | RESPIRATION RATE: 18 BRPM | HEART RATE: 81 BPM | WEIGHT: 240.6 LBS | BODY MASS INDEX: 38.67 KG/M2 | SYSTOLIC BLOOD PRESSURE: 128 MMHG | DIASTOLIC BLOOD PRESSURE: 75 MMHG

## 2025-03-14 PROCEDURE — 99238 HOSP IP/OBS DSCHRG MGMT 30/<: CPT | Performed by: PSYCHIATRY & NEUROLOGY

## 2025-03-14 RX ADMIN — MULTIPLE VITAMINS W/ MINERALS TAB 1 TABLET: TAB ORAL at 08:46

## 2025-03-14 RX ADMIN — BENZTROPINE MESYLATE 0.5 MG: 0.5 TABLET ORAL at 08:46

## 2025-03-14 RX ADMIN — PROPRANOLOL HYDROCHLORIDE 10 MG: 10 TABLET ORAL at 08:46

## 2025-03-14 RX ADMIN — LORATADINE 10 MG: 10 TABLET ORAL at 08:46

## 2025-03-14 RX ADMIN — ESCITALOPRAM OXALATE 20 MG: 10 TABLET, FILM COATED ORAL at 08:45

## 2025-03-14 RX ADMIN — DOCUSATE SODIUM 100 MG: 100 CAPSULE, LIQUID FILLED ORAL at 08:45

## 2025-03-14 RX ADMIN — HYDROCHLOROTHIAZIDE 12.5 MG: 12.5 TABLET ORAL at 08:45

## 2025-03-14 RX ADMIN — AMLODIPINE BESYLATE 5 MG: 5 TABLET ORAL at 08:45

## 2025-03-14 RX ADMIN — HALOPERIDOL 5 MG: 5 TABLET ORAL at 08:45

## 2025-03-14 RX ADMIN — PANTOPRAZOLE SODIUM 20 MG: 20 TABLET, DELAYED RELEASE ORAL at 08:46

## 2025-03-14 NOTE — ASSESSMENT & PLAN NOTE
Continue psychotropic medications including:   Continue Clozapine 275 mg nightly to confer mood stability in addition to addressing psychosis.   03/04: , BNP 44, CRP 7.2, ANC 4.08  CBC w/diff and CK every 2 weeks  Continue Cogentin 0.5 mg twice daily for EPS signs/symptoms.   Continue Haldol 5 mg twice daily to address psychosis   Continue Lexapro 20 mg daily to address depression and anxiety  Continue Propanolol 10 mg twice daily to address anxiety   Continue Melatonin 9 mg to address insomnia  On 2 antipsychotics due to subtherapeutic effects with monotherapy  Continue discharge planning: Patient pending ACT team linkage for discharge to his aunt's residence once MA funding comes through from the [facility].      No associated orders from this encounter found during lookback period of 72 hours.

## 2025-03-14 NOTE — CASE MANAGEMENT
Case Management Discharge Planning Note    Patient name Alberto Berumen  [facility] MRN [id number]  : 1995 Date 3/14/2025       Current Admission Date: 3/29/2024  Current Admission Diagnosis:Schizoaffective disorder, bipolar type (HCC)   Patient Active Problem List    Diagnosis Date Noted Date Diagnosed    Allergies 03/10/2025     Bilateral lower extremity edema 2024     Primary hypertension 2024     Elevated hemoglobin A1c 2024     Class 2 obesity in adult 2024     Confluent and reticulate papillomatosis 2024     Vitamin B 12 deficiency 11/15/2023     Vitamin D deficiency 11/15/2023     T wave inversion in EKG 2023     GERD (gastroesophageal reflux disease) 2023     Schizoaffective disorder, bipolar type (HCC) 2023     Tobacco abuse 2023     Chronic idiopathic constipation 2019     Syringoma 2014       LOS (days): 350  Geometric Mean LOS (GMLOS) (days):   Days to GMLOS:     OBJECTIVE:  Risk of Unplanned Readmission Score: 20.45         Current admission status: Psych - Long Term   Preferred Pharmacy:   [facility] - [place], [place] - [address] [place] [address]  [address] [address]  Phone: [phone] Fax: [phone]    Primary Care Provider: Joce Juan MD    Primary Insurance: MEDICARE  Secondary Insurance: COMMUNITY [facility]    DISCHARGE DETAILS:    Discharge planning discussed with:: Patient, Family, ACT Team  Freedom of Choice: Yes     CM contacted family/caregiver?: Yes  Were Treatment Team discharge recommendations reviewed with patient/caregiver?: Yes  Did patient/caregiver verbalize understanding of patient care needs?: Yes  Were patient/caregiver advised of the risks associated with not following Treatment Team discharge recommendations?: Yes    Contacts  Patient Contacts: Tess Browne  Relationship to Patient:: Family  Contact Method: Phone  Phone Number:  [phone]  Reason/Outcome: Emergency Contact, Discharge Planning              Other Referral/Resources/Interventions Provided:  Financial Resources Provided: Medicaid  Referrals Provided:: ACT  Government Services:: SNAP, Medical Assistance    Would you like to participate in our [facility] service program?  : No - Declined        03/14/25 0947   Discharge Planning   Living Arrangements Lives w/ Family members   Support Systems Self;Family members;/;Psychiatrist;Therapist   Assistance Needed None   Type of Current Residence Private residence   Current Home Care Services No   Other Referral/Resources/Interventions Provided:   Financial Resources Provided Medicaid   Referrals Provided: ACT   Government Services: SNAP;Medical Assistance   Discharge Communications   Discharge planning discussed with: Patient, Family, ACT Team   Freedom of Choice Yes   CM contacted family/caregiver? Yes   Were Treatment Team discharge recommendations reviewed with patient/caregiver? Yes   Did patient/caregiver verbalize understanding of patient care needs? Yes   Were patient/caregiver advised of the risks associated with not following Treatment Team discharge recommendations? Yes   Contacts   Patient Contacts Tess Browne   Relationship to Patient: Family   Contact Method Phone   Phone Number [phone]   Reason/Outcome Emergency Contact;Discharge Planning   [facility]   Would you like to participate in our [facility] service program?   No - Declined      03/14/25 0950    Discharge Notification   Notification of Discharge Provided to: ACT Team;Family;Other   ACT Team Notified via: Face to Face contact   Family Notified via: Face to Face contact   Other [facility]   Patient discharged from the unit on this day. Denied SI/HI, psychosis and/or A/V.  No questions verbalized. Patient is in agreement with discharge.  Patient provided with after care  appointment, discharge instructions and medication regime reviewed. Affect is appropriate. Accompanied to the lobby by his Aunt. Patient transported to home by Aunt.   All belongings returned.   Discharge Summary forwarded to [facility] and County Representative.

## 2025-03-14 NOTE — SOCIAL WORK
03/14/25 1135   Team Meeting   Initial Conference Date 03/14/25   Team Members Present   Team Members Present Physician;;Other (Discipline and Name)   Physician Team Member Holter   Social Work Team Member Jose J ORTEGA   Other (Discipline and Name) Gaby CHARLTON MHDS, Bogdan GORDON   Patient/Family Present   Patient Present Yes   Patient's Family Present No     Patient participated in his CSP meeting. Patient's Aunt was not present during the meeting. Attending reviewed his medications and diagnosis. This writer reviewed his appointments. Patient met his Peer Support and his therapist with ACT during the meeting. . [facility] [place] Representative will continue being his County CM in the community. Patient will be discharged today to live with his Aunt.

## 2025-03-14 NOTE — NURSING NOTE
"Pt is polite, pleasant and \"happy to be leaving\". Pt reports his s/s are \"stable\". Denies any unmet needs at this time.   "

## 2025-03-14 NOTE — NURSING NOTE
All belongings returned to patient from storage and security. AVS reviewed with and given to patient and Aunt. All questions voiced and answered at this time. Pt left unit with Aunt and  to the lobby. Discharged at this time.

## 2025-03-14 NOTE — PLAN OF CARE
Problem: Alteration in Thoughts and Perception  Goal: Treatment Goal: Gain control of psychotic behaviors/thinking, reduce/eliminate presenting symptoms and demonstrate improved reality functioning upon discharge  Outcome: Adequate for Discharge  Goal: Refrain from acting on delusional thinking/internal stimuli  Description: Interventions:  - Monitor patient closely, per order   - Utilize least restrictive measures   - Set reasonable limits, give positive feedback for acceptable   - Administer medications as ordered and monitor of potential side effects  Outcome: Adequate for Discharge  Goal: Agree to be compliant with medication regime, as prescribed and report medication side effects  Description: Interventions:  - Offer appropriate PRN medication and supervise ingestion; conduct AIMS, as needed   Outcome: Adequate for Discharge  Goal: Recognize dysfunctional thoughts, communicate reality-based thoughts at the time of discharge  Description: Interventions:  - Provide medication and psycho-education to assist patient in compliance and developing insight into his/her illness   Outcome: Adequate for Discharge     Problem: Ineffective Coping  Goal: Identifies ineffective coping skills  Outcome: Adequate for Discharge  Goal: Identifies healthy coping skills  Outcome: Adequate for Discharge  Goal: Demonstrates healthy coping skills  Outcome: Adequate for Discharge  Goal: Patient/Family participate in treatment and DC plans  Description: Interventions:  - Provide therapeutic environment  Outcome: Adequate for Discharge  Goal: Patient/Family verbalizes awareness of resources  Outcome: Adequate for Discharge     Problem: Depression  Goal: Treatment Goal: Demonstrate behavioral control of depressive symptoms, verbalize feelings of improved mood/affect, and adopt new coping skills prior to discharge  Outcome: Adequate for Discharge  Goal: Verbalize thoughts and feelings  Description: Interventions:  - Assess and re-assess  patient's level of risk   - Engage patient in 1:1 interactions, daily, for a minimum of 15 minutes   - Encourage patient to express feelings, fears, frustrations, hopes   Outcome: Adequate for Discharge  Goal: Refrain from harming self  Description: Interventions:  - Monitor patient closely, per order   - Supervise medication ingestion, monitor effects and side effects   Outcome: Adequate for Discharge  Goal: Refrain from isolation  Description: Interventions:  - Develop a trusting relationship   - Encourage socialization   Outcome: Adequate for Discharge  Goal: Refrain from self-neglect  Outcome: Adequate for Discharge     Problem: Anxiety  Goal: Anxiety is at manageable level  Description: Interventions:  - Assess and monitor patient's anxiety level.   - Monitor for signs and symptoms (heart palpitations, chest pain, shortness of breath, headaches, nausea, feeling jumpy, restlessness, irritable, apprehensive).   - Collaborate with interdisciplinary team and initiate plan and interventions as ordered.  - [place] patient to unit/surroundings  - Explain treatment plan  - Encourage participation in care  - Encourage verbalization of concerns/fears  - Identify coping mechanisms  - Assist in developing anxiety-reducing skills  - Administer/offer alternative therapies  - Limit or eliminate stimulants  Outcome: Adequate for Discharge     Problem: Alteration in Orientation  Goal: Treatment Goal: Demonstrate a reduction of confusion and improved orientation to person, place, time and/or situation upon discharge, according to optimum baseline/ability  Outcome: Adequate for Discharge  Goal: Interact with staff daily  Description: Interventions:  - Assess and re-assess patient's level of orientation  - Engage patient in 1 on 1 interactions, daily, for a minimum of 15 minutes   - Establish rapport/trust with patient   Outcome: Adequate for Discharge  Goal: Express concerns related to confused thinking related to:  Description:  Interventions:  - Encourage patient to express feelings, fears, frustrations, hopes  - Assign consistent caregivers   - Tate/re-orient patient as needed  - Allow comfort items, as appropriate  - Provide visual cues, signs, etc.   Outcome: Adequate for Discharge  Goal: Allow medical examinations, as recommended  Description: Interventions:  - Provide physical/neurological exams and/or referrals, per provider   Outcome: Adequate for Discharge  Goal: Cooperate with recommended testing/procedures  Description: Interventions:  - Determine need for ancillary testing  - Observe for mental status changes  - Implement falls/precaution protocol   Outcome: Adequate for Discharge  Goal: Complete daily ADLs, including personal hygiene independently, as able  Description: Interventions:  - Observe, teach, and assist patient with ADLS  Outcome: Adequate for Discharge     Problem: DISCHARGE PLANNING  Goal: Discharge to home with appropriate resources  Description: INTERVENTIONS:  - Identify barriers to discharge w/patient and caregiver  - Arrange for needed discharge resources and transportation as appropriate  - Identify discharge learning needs (meds, wound care, etc.)  - Refer to Case Management Department for coordinating discharge planning if the patient needs post-hospital services based on physician/advanced practitioner order or complex needs related to functional status, cognitive ability, or social support system  Outcome: Adequate for Discharge      5-Fu Pregnancy And Lactation Text: This medication is Pregnancy Category X and contraindicated in pregnancy and in women who may become pregnant. It is unknown if this medication is excreted in breast milk.

## 2025-03-14 NOTE — PLAN OF CARE
Problem: Alteration in Thoughts and Perception  Goal: Treatment Goal: Gain control of psychotic behaviors/thinking, reduce/eliminate presenting symptoms and demonstrate improved reality functioning upon discharge  Outcome: Progressing  Goal: Refrain from acting on delusional thinking/internal stimuli  Description: Interventions:  - Monitor patient closely, per order   - Utilize least restrictive measures   - Set reasonable limits, give positive feedback for acceptable   - Administer medications as ordered and monitor of potential side effects  Outcome: Progressing  Goal: Agree to be compliant with medication regime, as prescribed and report medication side effects  Description: Interventions:  - Offer appropriate PRN medication and supervise ingestion; conduct AIMS, as needed   Outcome: Progressing  Goal: Recognize dysfunctional thoughts, communicate reality-based thoughts at the time of discharge  Description: Interventions:  - Provide medication and psycho-education to assist patient in compliance and developing insight into his/her illness   Outcome: Progressing     Problem: Ineffective Coping  Goal: Identifies ineffective coping skills  Outcome: Progressing  Goal: Identifies healthy coping skills  Outcome: Progressing  Goal: Demonstrates healthy coping skills  Outcome: Progressing     Problem: Depression  Goal: Treatment Goal: Demonstrate behavioral control of depressive symptoms, verbalize feelings of improved mood/affect, and adopt new coping skills prior to discharge  Outcome: Progressing  Goal: Verbalize thoughts and feelings  Description: Interventions:  - Assess and re-assess patient's level of risk   - Engage patient in 1:1 interactions, daily, for a minimum of 15 minutes   - Encourage patient to express feelings, fears, frustrations, hopes   Outcome: Progressing  Goal: Refrain from isolation  Description: Interventions:  - Develop a trusting relationship   - Encourage socialization   Outcome:  Progressing     Problem: Anxiety  Goal: Anxiety is at manageable level  Description: Interventions:  - Assess and monitor patient's anxiety level.   - Monitor for signs and symptoms (heart palpitations, chest pain, shortness of breath, headaches, nausea, feeling jumpy, restlessness, irritable, apprehensive).   - Collaborate with interdisciplinary team and initiate plan and interventions as ordered.  - [place] patient to unit/surroundings  - Explain treatment plan  - Encourage participation in care  - Encourage verbalization of concerns/fears  - Identify coping mechanisms  - Assist in developing anxiety-reducing skills  - Administer/offer alternative therapies  - Limit or eliminate stimulants  Outcome: Progressing     Problem: Alteration in Orientation  Goal: Interact with staff daily  Description: Interventions:  - Assess and re-assess patient's level of orientation  - Engage patient in 1 on 1 interactions, daily, for a minimum of 15 minutes   - Establish rapport/trust with patient   Outcome: Progressing     Problem: DISCHARGE PLANNING  Goal: Discharge to home with appropriate resources  Description: INTERVENTIONS:  - Identify barriers to discharge w/patient and caregiver  - Arrange for needed discharge resources and transportation as appropriate  - Identify discharge learning needs (meds, wound care, etc.)  - Refer to Case Management Department for coordinating discharge planning if the patient needs post-hospital services based on physician/advanced practitioner order or complex needs related to functional status, cognitive ability, or social support system  Outcome: Progressing

## 2025-03-14 NOTE — PROGRESS NOTES
03/14/25 0815   Team Meeting   Meeting Type Daily Rounds   Team Members Present   Team Members Present Physician;Nurse;;Other (Discipline and Name)   Physician Team Member Holter, Hangey CRNP   Nursing Team Member Claudia   Social Work Team Member Jose J ORTEGA   Other (Discipline and Name) Wang PCM   Patient/Family Present   Patient Present No   Patient's Family Present No     Groups Participation  3/9.   D/C and CSP today. D/C to home with family. Patient's compliant with medications. He's social with peers. [facility] intake complete.

## 2025-03-14 NOTE — SOCIAL WORK
Patient  met with [facility] and completed his intake and sign on. ACT will beginn services tomorrow. Patient will be under the care of Dr. Simon. ACT will administer patient's medications weekly, but meet with patient at home three times a week.

## 2025-03-14 NOTE — BH TRANSITION RECORD
Contact Information: If you have any questions, concerns, pended studies, tests and/or procedures, or emergencies regarding your inpatient behavioral health visit. Please contact [facility] acute care unit [phone] and ask to speak to a , nurse or physician. A contact is available 24 hours/ 7 days a week at this number.     Summary of Procedures Performed During your Stay:  Below is a list of major procedures performed during your hospital stay and a summary of results:  - No major procedures performed.    Pending Studies (From admission, onward)       Start     Ordered    02/04/25 0600  CK  Every 2 weeks      Start Status   03/18/25 0600 Scheduled   04/01/25 0600 Scheduled       01/27/25 1301    02/04/25 0600  C-reactive protein  Every 2 weeks      Start Status   03/18/25 0600 Scheduled   04/01/25 0600 Scheduled       01/27/25 1301    02/04/25 0600  B-Type Natriuretic Peptide(BNP)  Every 2 weeks      Start Status   03/18/25 0600 Scheduled   04/01/25 0600 Scheduled       01/27/25 1301    12/24/24 0600  CBC and differential  (cloZAPine (Clozaril) panel - new IP starts and resuming from prior to admission)  Every 2 weeks      Start Status   03/18/25 0600 Scheduled   04/01/25 0600 Scheduled   04/15/25 0600 Scheduled       12/12/24 0452    06/07/24 0000  Electroconvulsive therapy (ECT)  Every Mon-Wed-Fri,   Status:  Canceled      Comments: Maintenance ECT on 06/07, 06/21 , 07/05, 07/19, 08/02 and 08/23   Order ID Start Status   [id number] 06/10/24 0000 Sent       05/30/24 1043    04/12/24 0000  Electroconvulsive therapy (ECT)  Once,   Status:  Canceled      Comments: Every Friday for 7 treatments   Order ID Start Status   [id number] 04/12/24 0610 Sent       04/11/24 0854                  Please follow up on the above pending studies with your PCP and/or referring provider.

## 2025-04-03 ENCOUNTER — APPOINTMENT (OUTPATIENT)
Age: 30
End: 2025-04-03
Payer: MEDICARE

## 2025-04-03 DIAGNOSIS — Z79.899 ENCOUNTER FOR LONG-TERM (CURRENT) USE OF MEDICATIONS: ICD-10-CM

## 2025-04-03 LAB
ALBUMIN SERPL BCG-MCNC: 4.4 G/DL (ref 3.5–5)
ALP SERPL-CCNC: 85 U/L (ref 34–104)
ALT SERPL W P-5'-P-CCNC: 31 U/L (ref 7–52)
AST SERPL W P-5'-P-CCNC: 22 U/L (ref 13–39)
BILIRUB DIRECT SERPL-MCNC: 0.07 MG/DL (ref 0–0.2)
BILIRUB SERPL-MCNC: 0.58 MG/DL (ref 0.2–1)
CLOZAPINE SERPL-MCNC: 227 NG/ML (ref 350–900)
PROT SERPL-MCNC: 7.8 G/DL (ref 6.4–8.4)

## 2025-04-03 PROCEDURE — 80076 HEPATIC FUNCTION PANEL: CPT

## 2025-04-03 PROCEDURE — 80159 DRUG ASSAY CLOZAPINE: CPT

## 2025-04-03 PROCEDURE — 36415 COLL VENOUS BLD VENIPUNCTURE: CPT

## 2025-05-16 ENCOUNTER — APPOINTMENT (OUTPATIENT)
Age: 30
End: 2025-05-16
Payer: MEDICARE

## 2025-05-16 DIAGNOSIS — Z79.899 ENCOUNTER FOR LONG-TERM (CURRENT) USE OF MEDICATIONS: ICD-10-CM

## 2025-05-16 LAB
ALBUMIN SERPL BCG-MCNC: 4.3 G/DL (ref 3.5–5)
ALP SERPL-CCNC: 95 U/L (ref 34–104)
ALT SERPL W P-5'-P-CCNC: 37 U/L (ref 7–52)
AST SERPL W P-5'-P-CCNC: 18 U/L (ref 13–39)
BASOPHILS # BLD AUTO: 0.08 THOUSANDS/ÂΜL (ref 0–0.1)
BASOPHILS NFR BLD AUTO: 1 % (ref 0–1)
BILIRUB DIRECT SERPL-MCNC: 0.09 MG/DL (ref 0–0.2)
BILIRUB SERPL-MCNC: 0.53 MG/DL (ref 0.2–1)
CLOZAPINE SERPL-MCNC: 405 NG/ML (ref 350–900)
EOSINOPHIL # BLD AUTO: 0.78 THOUSAND/ÂΜL (ref 0–0.61)
EOSINOPHIL NFR BLD AUTO: 8 % (ref 0–6)
ERYTHROCYTE [DISTWIDTH] IN BLOOD BY AUTOMATED COUNT: 14.9 % (ref 11.6–15.1)
HCT VFR BLD AUTO: 43.8 % (ref 36.5–49.3)
HGB BLD-MCNC: 13.6 G/DL (ref 12–17)
IMM GRANULOCYTES # BLD AUTO: 0.03 THOUSAND/UL (ref 0–0.2)
IMM GRANULOCYTES NFR BLD AUTO: 0 % (ref 0–2)
LYMPHOCYTES # BLD AUTO: 2.72 THOUSANDS/ÂΜL (ref 0.6–4.47)
LYMPHOCYTES NFR BLD AUTO: 28 % (ref 14–44)
MCH RBC QN AUTO: 24.2 PG (ref 26.8–34.3)
MCHC RBC AUTO-ENTMCNC: 31.1 G/DL (ref 31.4–37.4)
MCV RBC AUTO: 78 FL (ref 82–98)
MONOCYTES # BLD AUTO: 0.78 THOUSAND/ÂΜL (ref 0.17–1.22)
MONOCYTES NFR BLD AUTO: 8 % (ref 4–12)
NEUTROPHILS # BLD AUTO: 5.38 THOUSANDS/ÂΜL (ref 1.85–7.62)
NEUTS SEG NFR BLD AUTO: 55 % (ref 43–75)
NRBC BLD AUTO-RTO: 0 /100 WBCS
PLATELET # BLD AUTO: 257 THOUSANDS/UL (ref 149–390)
PMV BLD AUTO: 11 FL (ref 8.9–12.7)
PROT SERPL-MCNC: 7.6 G/DL (ref 6.4–8.4)
RBC # BLD AUTO: 5.61 MILLION/UL (ref 3.88–5.62)
WBC # BLD AUTO: 9.77 THOUSAND/UL (ref 4.31–10.16)

## 2025-05-16 PROCEDURE — 85025 COMPLETE CBC W/AUTO DIFF WBC: CPT

## 2025-05-16 PROCEDURE — 36415 COLL VENOUS BLD VENIPUNCTURE: CPT

## 2025-05-16 PROCEDURE — 80159 DRUG ASSAY CLOZAPINE: CPT

## 2025-05-16 PROCEDURE — 80076 HEPATIC FUNCTION PANEL: CPT

## 2025-06-03 ENCOUNTER — OFFICE VISIT (OUTPATIENT)
Dept: FAMILY MEDICINE CLINIC | Facility: CLINIC | Age: 30
End: 2025-06-03
Payer: MEDICARE

## 2025-06-03 VITALS
HEART RATE: 98 BPM | OXYGEN SATURATION: 99 % | HEIGHT: 67 IN | TEMPERATURE: 97.8 F | SYSTOLIC BLOOD PRESSURE: 122 MMHG | BODY MASS INDEX: 37.17 KG/M2 | DIASTOLIC BLOOD PRESSURE: 78 MMHG | WEIGHT: 236.8 LBS

## 2025-06-03 DIAGNOSIS — K21.9 GASTROESOPHAGEAL REFLUX DISEASE WITHOUT ESOPHAGITIS: ICD-10-CM

## 2025-06-03 DIAGNOSIS — Z13.220 ENCOUNTER FOR SCREENING FOR LIPID DISORDER: ICD-10-CM

## 2025-06-03 DIAGNOSIS — F25.0 SCHIZOAFFECTIVE DISORDER, BIPOLAR TYPE (HCC): Chronic | ICD-10-CM

## 2025-06-03 DIAGNOSIS — I10 PRIMARY HYPERTENSION: Primary | ICD-10-CM

## 2025-06-03 PROCEDURE — 99203 OFFICE O/P NEW LOW 30 MIN: CPT | Performed by: FAMILY MEDICINE

## 2025-06-03 RX ORDER — PROPRANOLOL HCL 20 MG
TABLET ORAL
COMMUNITY
Start: 2025-04-14 | End: 2025-06-03

## 2025-06-03 RX ORDER — CLOZAPINE 200 MG/1
TABLET ORAL
COMMUNITY
Start: 2025-04-14

## 2025-06-03 RX ORDER — PANTOPRAZOLE SODIUM 40 MG/1
TABLET, DELAYED RELEASE ORAL
COMMUNITY
Start: 2025-04-14 | End: 2025-06-03

## 2025-06-03 RX ORDER — AMLODIPINE BESYLATE 5 MG/1
5 TABLET ORAL DAILY
Qty: 30 TABLET | Refills: 5 | Status: SHIPPED | OUTPATIENT
Start: 2025-06-03

## 2025-06-03 RX ORDER — PANTOPRAZOLE SODIUM 20 MG/1
20 TABLET, DELAYED RELEASE ORAL EVERY MORNING
Qty: 30 TABLET | Refills: 5 | Status: SHIPPED | OUTPATIENT
Start: 2025-06-03 | End: 2025-07-03

## 2025-06-03 RX ORDER — CLOZAPINE 100 MG/1
TABLET ORAL
COMMUNITY
Start: 2025-05-27

## 2025-06-03 RX ORDER — HYDROCHLOROTHIAZIDE 12.5 MG/1
12.5 TABLET ORAL DAILY
Qty: 30 TABLET | Refills: 0 | Status: SHIPPED | OUTPATIENT
Start: 2025-06-03

## 2025-06-03 RX ORDER — HYDROCHLOROTHIAZIDE 12.5 MG/1
CAPSULE ORAL
COMMUNITY
Start: 2025-05-12

## 2025-06-03 RX ORDER — BENZTROPINE MESYLATE 0.5 MG/1
TABLET ORAL
COMMUNITY
Start: 2025-04-04

## 2025-06-03 RX ORDER — HYDROCHLOROTHIAZIDE 12.5 MG/1
12.5 TABLET ORAL DAILY
Qty: 30 TABLET | Refills: 5 | Status: SHIPPED | OUTPATIENT
Start: 2025-06-03 | End: 2025-06-03 | Stop reason: SDUPTHER

## 2025-06-03 NOTE — ASSESSMENT & PLAN NOTE
Continue current PPI  GERD lifestyle changes discussed, including avoidance of trigger foods (potential foods include coffee, caffeine, chocolate, mint, tomato-based products, spicy foods, fatty foods), avoid tight fitting clothing, elevated head of bed 30 degrees, avoid eating 2-3 hours prior to bedtime, weight loss, avoid alcohol, avoid tobacco use.  Orders:    pantoprazole (PROTONIX) 20 mg tablet; Take 1 tablet (20 mg total) by mouth every morning

## 2025-06-03 NOTE — ASSESSMENT & PLAN NOTE
Stable within parameters continue current care amlodipine hydrochlorothiazide low-salt sodium diet encouraged weight loss strategies obtain updated BMP lipid profile  Orders:    Comprehensive metabolic panel; Future    hydroCHLOROthiazide 12.5 mg tablet; Take 1 tablet (12.5 mg total) by mouth daily    amLODIPine (NORVASC) 5 mg tablet; Take 1 tablet (5 mg total) by mouth daily

## 2025-06-03 NOTE — PROGRESS NOTES
Name: Alberto Berumen      : 1995      MRN: [id number]  Encounter Provider: HOLLI Dempsey  Encounter Date: 6/3/2025   Encounter department: [facility] [address]  :  Assessment & Plan  Primary hypertension  Stable within parameters continue current care amlodipine hydrochlorothiazide low-salt sodium diet encouraged weight loss strategies obtain updated BMP lipid profile  Orders:    Comprehensive metabolic panel; Future    hydroCHLOROthiazide 12.5 mg tablet; Take 1 tablet (12.5 mg total) by mouth daily    amLODIPine (NORVASC) 5 mg tablet; Take 1 tablet (5 mg total) by mouth daily    Encounter for screening for lipid disorder  Will obtain updated lipid profile  Orders:    Lipid Panel with Direct LDL reflex; Future    Gastroesophageal reflux disease without esophagitis  Continue current PPI  GERD lifestyle changes discussed, including avoidance of trigger foods (potential foods include coffee, caffeine, chocolate, mint, tomato-based products, spicy foods, fatty foods), avoid tight fitting clothing, elevated head of bed 30 degrees, avoid eating 2-3 hours prior to bedtime, weight loss, avoid alcohol, avoid tobacco use.  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   HENT:  Negative for congestion, dental problem, ear pain, hearing loss, postnasal drip, rhinorrhea, sinus pressure, sinus pain, sneezing, sore throat, tinnitus and voice change.    Eyes:  Negative for visual disturbance.  "  Respiratory:  Negative for apnea, cough, chest tightness and shortness of breath.    Cardiovascular:  Negative for chest pain, palpitations and leg swelling.   Gastrointestinal:  Negative for abdominal pain, blood in stool, constipation, diarrhea, nausea and vomiting.   Endocrine: Negative for cold intolerance, heat intolerance, polydipsia, polyphagia and polyuria.   Genitourinary:  Negative for decreased urine volume, difficulty urinating, dysuria, frequency and hematuria.   Musculoskeletal:  Negative for arthralgias, back pain, gait problem, joint swelling and myalgias.   Skin:  Negative for color change, rash and wound.   Allergic/Immunologic: Negative for environmental allergies and food allergies.   Neurological:  Negative for dizziness, syncope, weakness, light-headedness, numbness and headaches.   Hematological:  Negative for adenopathy. Does not bruise/bleed easily.   Psychiatric/Behavioral:  Negative for sleep disturbance and suicidal ideas. The patient is not nervous/anxious.        Objective   /82 (BP Location: Left arm, Patient Position: Sitting)   Pulse 98   Temp 97.8 °F (36.6 °C)   Ht 5' 7\" (1.702 m)   Wt 107 kg (236 lb 12.8 oz)   SpO2 99%   BMI 37.09 kg/m²      Physical Exam  Constitutional:       General: He is not in acute distress.     Appearance: He is not ill-appearing or toxic-appearing.   HENT:      Head: Normocephalic and atraumatic.     Eyes:      Pupils: Pupils are equal, round, and reactive to light.       Cardiovascular:      Rate and Rhythm: Normal rate.      Pulses: Normal pulses.   Pulmonary:      Effort: Pulmonary effort is normal.     Musculoskeletal:         General: Normal range of motion.      Cervical back: Normal range of motion.     Skin:     General: Skin is warm and dry.     Neurological:      General: No focal deficit present.      Mental Status: He is oriented to person, place, and time.     Psychiatric:         Mood and Affect: Mood normal.         Behavior: " Behavior normal.         Thought Content: Thought content normal.         Judgment: Judgment normal.

## 2025-07-01 NOTE — NURSING NOTE
This RN arrived at 1900.   Pleasant and cooperative on the unit.  Less isolative and more verbal with peers and staff.  Brightens and smiles on approach. Continues to have auditory hallucinations of people wanting to hurt him.  Denies suicidal ideations. Medication compliant. Attends group 6/9 with no interaction. Appetite excellent.    Abdominal Pain, N/V/D

## 2025-07-02 DIAGNOSIS — I10 PRIMARY HYPERTENSION: ICD-10-CM

## 2025-07-02 NOTE — TELEPHONE ENCOUNTER
Reason for call:   [x] Refill   [] Prior Auth  [] Other:     Office:   [x] PCP/Provider - PG BECKA MCCORMICK [address] [place] [address]  Authorized By: HOLLI Dempsey  [] Specialty/Provider -     Medication: hydroCHLOROthiazide 12.5 mg tablet     Dose/Frequency: Take 1 tablet (12.5 mg total) by mouth daily     Quantity: 30    Pharmacy: [facility] - [place], [place] - [address] [place] [address] Pharmacy   Does the patient have enough for 3 days?   [x] Yes   [] No - Send as HP to POD    Mail Away Pharmacy   Does the patient have enough for 10 days?   [] Yes   [] No - Send as HP to POD

## 2025-07-03 RX ORDER — HYDROCHLOROTHIAZIDE 12.5 MG/1
12.5 TABLET ORAL DAILY
Qty: 30 TABLET | Refills: 5 | Status: SHIPPED | OUTPATIENT
Start: 2025-07-03

## 2025-07-09 ENCOUNTER — APPOINTMENT (OUTPATIENT)
Age: 30
End: 2025-07-09
Payer: MEDICARE

## 2025-07-09 DIAGNOSIS — Z13.220 ENCOUNTER FOR SCREENING FOR LIPID DISORDER: ICD-10-CM

## 2025-07-09 DIAGNOSIS — Z79.899 ENCOUNTER FOR LONG-TERM (CURRENT) USE OF MEDICATIONS: ICD-10-CM

## 2025-07-09 DIAGNOSIS — I10 PRIMARY HYPERTENSION: ICD-10-CM

## 2025-07-09 LAB
ALBUMIN SERPL BCG-MCNC: 4.2 G/DL (ref 3.5–5)
ALP SERPL-CCNC: 108 U/L (ref 34–104)
ALT SERPL W P-5'-P-CCNC: 57 U/L (ref 7–52)
ANION GAP SERPL CALCULATED.3IONS-SCNC: 9 MMOL/L (ref 4–13)
AST SERPL W P-5'-P-CCNC: 23 U/L (ref 13–39)
BILIRUB DIRECT SERPL-MCNC: 0.13 MG/DL (ref 0–0.2)
BILIRUB SERPL-MCNC: 0.54 MG/DL (ref 0.2–1)
BUN SERPL-MCNC: 10 MG/DL (ref 5–25)
CALCIUM SERPL-MCNC: 9.5 MG/DL (ref 8.4–10.2)
CHLORIDE SERPL-SCNC: 101 MMOL/L (ref 96–108)
CHOLEST SERPL-MCNC: 188 MG/DL (ref ?–200)
CLOZAPINE SERPL-MCNC: 710 NG/ML (ref 350–900)
CO2 SERPL-SCNC: 29 MMOL/L (ref 21–32)
CREAT SERPL-MCNC: 0.94 MG/DL (ref 0.6–1.3)
GFR SERPL CREATININE-BSD FRML MDRD: 109 ML/MIN/1.73SQ M
GLUCOSE P FAST SERPL-MCNC: 105 MG/DL (ref 65–99)
HDLC SERPL-MCNC: 41 MG/DL
LDLC SERPL CALC-MCNC: 106 MG/DL (ref 0–100)
POTASSIUM SERPL-SCNC: 3.8 MMOL/L (ref 3.5–5.3)
PROT SERPL-MCNC: 7.5 G/DL (ref 6.4–8.4)
SODIUM SERPL-SCNC: 139 MMOL/L (ref 135–147)
TRIGL SERPL-MCNC: 203 MG/DL (ref ?–150)

## 2025-07-09 PROCEDURE — 80053 COMPREHEN METABOLIC PANEL: CPT

## 2025-07-09 PROCEDURE — 80061 LIPID PANEL: CPT

## 2025-07-09 PROCEDURE — 36415 COLL VENOUS BLD VENIPUNCTURE: CPT

## 2025-07-09 PROCEDURE — 80159 DRUG ASSAY CLOZAPINE: CPT

## 2025-07-09 PROCEDURE — 82248 BILIRUBIN DIRECT: CPT

## 2025-08-07 ENCOUNTER — APPOINTMENT (OUTPATIENT)
Age: 30
End: 2025-08-07
Payer: MEDICARE